# Patient Record
Sex: MALE | Race: WHITE
[De-identification: names, ages, dates, MRNs, and addresses within clinical notes are randomized per-mention and may not be internally consistent; named-entity substitution may affect disease eponyms.]

---

## 2017-01-06 ENCOUNTER — RX RENEWAL (OUTPATIENT)
Age: 62
End: 2017-01-06

## 2017-01-12 ENCOUNTER — APPOINTMENT (OUTPATIENT)
Dept: HEART AND VASCULAR | Facility: CLINIC | Age: 62
End: 2017-01-12

## 2017-01-12 VITALS
DIASTOLIC BLOOD PRESSURE: 76 MMHG | WEIGHT: 250 LBS | HEART RATE: 84 BPM | SYSTOLIC BLOOD PRESSURE: 134 MMHG | HEIGHT: 73 IN | BODY MASS INDEX: 33.13 KG/M2

## 2017-01-15 LAB
ALBUMIN SERPL ELPH-MCNC: 4.3 G/DL
ALP BLD-CCNC: 78 U/L
ALT SERPL-CCNC: 34 U/L
ANION GAP SERPL CALC-SCNC: 14 MMOL/L
APTT BLD: 27.8 SEC
AST SERPL-CCNC: 22 U/L
BASOPHILS # BLD AUTO: 0.04 K/UL
BASOPHILS NFR BLD AUTO: 0.5 %
BILIRUB SERPL-MCNC: 0.5 MG/DL
BUN SERPL-MCNC: 25 MG/DL
CALCIUM SERPL-MCNC: 9.7 MG/DL
CHLORIDE SERPL-SCNC: 99 MMOL/L
CHOLEST SERPL-MCNC: 199 MG/DL
CHOLEST/HDLC SERPL: 4.4 RATIO
CO2 SERPL-SCNC: 28 MMOL/L
CREAT SERPL-MCNC: 1.06 MG/DL
EOSINOPHIL # BLD AUTO: 0.17 K/UL
EOSINOPHIL NFR BLD AUTO: 2 %
GLUCOSE SERPL-MCNC: 162 MG/DL
HBA1C MFR BLD HPLC: 9.8 %
HCT VFR BLD CALC: 43.9 %
HDLC SERPL-MCNC: 45 MG/DL
HGB BLD-MCNC: 14.2 G/DL
IMM GRANULOCYTES NFR BLD AUTO: 0.1 %
INR PPP: 0.96 RATIO
LDLC SERPL CALC-MCNC: 122 MG/DL
LYMPHOCYTES # BLD AUTO: 1.75 K/UL
LYMPHOCYTES NFR BLD AUTO: 21 %
MAN DIFF?: NORMAL
MCHC RBC-ENTMCNC: 28.3 PG
MCHC RBC-ENTMCNC: 32.3 GM/DL
MCV RBC AUTO: 87.5 FL
MONOCYTES # BLD AUTO: 0.85 K/UL
MONOCYTES NFR BLD AUTO: 10.2 %
NEUTROPHILS # BLD AUTO: 5.53 K/UL
NEUTROPHILS NFR BLD AUTO: 66.2 %
PLATELET # BLD AUTO: 239 K/UL
POTASSIUM SERPL-SCNC: 5 MMOL/L
PROT SERPL-MCNC: 7.2 G/DL
PT BLD: 10.9 SEC
RBC # BLD: 5.02 M/UL
RBC # FLD: 14.7 %
SODIUM SERPL-SCNC: 141 MMOL/L
TRIGL SERPL-MCNC: 161 MG/DL
WBC # FLD AUTO: 8.35 K/UL

## 2017-03-06 ENCOUNTER — RX RENEWAL (OUTPATIENT)
Age: 62
End: 2017-03-06

## 2017-04-24 ENCOUNTER — LABORATORY RESULT (OUTPATIENT)
Age: 62
End: 2017-04-24

## 2017-04-25 LAB
ALBUMIN SERPL ELPH-MCNC: 3.9 G/DL
ALP BLD-CCNC: 82 U/L
ALT SERPL-CCNC: 54 U/L
ANION GAP SERPL CALC-SCNC: 16 MMOL/L
APTT BLD: 27.8 SEC
AST SERPL-CCNC: 30 U/L
BILIRUB SERPL-MCNC: 0.4 MG/DL
BUN SERPL-MCNC: 23 MG/DL
CALCIUM SERPL-MCNC: 9.6 MG/DL
CHLORIDE SERPL-SCNC: 101 MMOL/L
CHOLEST SERPL-MCNC: 161 MG/DL
CHOLEST/HDLC SERPL: 3.7 RATIO
CO2 SERPL-SCNC: 26 MMOL/L
CREAT SERPL-MCNC: 1.02 MG/DL
GLUCOSE SERPL-MCNC: 154 MG/DL
HDLC SERPL-MCNC: 44 MG/DL
INR PPP: 0.96 RATIO
LDLC SERPL CALC-MCNC: 83 MG/DL
POTASSIUM SERPL-SCNC: 5.2 MMOL/L
PROT SERPL-MCNC: 7.2 G/DL
PT BLD: 10.8 SEC
SODIUM SERPL-SCNC: 143 MMOL/L
TRIGL SERPL-MCNC: 170 MG/DL
TSH SERPL-ACNC: 3.68 UIU/ML

## 2017-05-02 LAB
BASOPHILS # BLD AUTO: 0.05 K/UL
BASOPHILS NFR BLD AUTO: 0.6 %
EOSINOPHIL # BLD AUTO: 0.23 K/UL
EOSINOPHIL NFR BLD AUTO: 2.7 %
HBA1C MFR BLD HPLC: 9.9 %
HCT VFR BLD CALC: 42.1 %
HGB BLD-MCNC: 13.6 G/DL
IMM GRANULOCYTES NFR BLD AUTO: 0.2 %
LYMPHOCYTES # BLD AUTO: 1.84 K/UL
LYMPHOCYTES NFR BLD AUTO: 21.5 %
MAN DIFF?: NORMAL
MCHC RBC-ENTMCNC: 27.4 PG
MCHC RBC-ENTMCNC: 32.3 GM/DL
MCV RBC AUTO: 84.7 FL
MONOCYTES # BLD AUTO: 0.78 K/UL
MONOCYTES NFR BLD AUTO: 9.1 %
NEUTROPHILS # BLD AUTO: 5.62 K/UL
NEUTROPHILS NFR BLD AUTO: 65.9 %
PLATELET # BLD AUTO: 273 K/UL
RBC # BLD: 4.97 M/UL
RBC # FLD: 14.5 %
WBC # FLD AUTO: 8.54 K/UL

## 2017-05-18 ENCOUNTER — APPOINTMENT (OUTPATIENT)
Dept: BARIATRICS | Facility: CLINIC | Age: 62
End: 2017-05-18

## 2017-05-18 VITALS
SYSTOLIC BLOOD PRESSURE: 134 MMHG | HEART RATE: 89 BPM | TEMPERATURE: 97.8 F | OXYGEN SATURATION: 97 % | BODY MASS INDEX: 34.76 KG/M2 | HEIGHT: 73 IN | DIASTOLIC BLOOD PRESSURE: 72 MMHG | WEIGHT: 262.25 LBS

## 2017-06-06 ENCOUNTER — RX RENEWAL (OUTPATIENT)
Age: 62
End: 2017-06-06

## 2017-06-07 ENCOUNTER — APPOINTMENT (OUTPATIENT)
Dept: BARIATRICS | Facility: CLINIC | Age: 62
End: 2017-06-07

## 2017-06-19 ENCOUNTER — INPATIENT (INPATIENT)
Facility: HOSPITAL | Age: 62
LOS: 2 days | Discharge: HOME | End: 2017-06-22
Attending: INTERNAL MEDICINE

## 2017-06-19 DIAGNOSIS — J18.9 PNEUMONIA, UNSPECIFIED ORGANISM: ICD-10-CM

## 2017-06-19 DIAGNOSIS — E11.621 TYPE 2 DIABETES MELLITUS WITH FOOT ULCER: ICD-10-CM

## 2017-06-19 DIAGNOSIS — I24.9 ACUTE ISCHEMIC HEART DISEASE, UNSPECIFIED: ICD-10-CM

## 2017-06-28 DIAGNOSIS — Z86.14 PERSONAL HISTORY OF METHICILLIN RESISTANT STAPHYLOCOCCUS AUREUS INFECTION: ICD-10-CM

## 2017-06-28 DIAGNOSIS — I25.10 ATHEROSCLEROTIC HEART DISEASE OF NATIVE CORONARY ARTERY WITHOUT ANGINA PECTORIS: ICD-10-CM

## 2017-06-28 DIAGNOSIS — J18.1 LOBAR PNEUMONIA, UNSPECIFIED ORGANISM: ICD-10-CM

## 2017-06-28 DIAGNOSIS — Z95.5 PRESENCE OF CORONARY ANGIOPLASTY IMPLANT AND GRAFT: ICD-10-CM

## 2017-06-28 DIAGNOSIS — F32.9 MAJOR DEPRESSIVE DISORDER, SINGLE EPISODE, UNSPECIFIED: ICD-10-CM

## 2017-06-28 DIAGNOSIS — A41.9 SEPSIS, UNSPECIFIED ORGANISM: ICD-10-CM

## 2017-06-28 DIAGNOSIS — J18.9 PNEUMONIA, UNSPECIFIED ORGANISM: ICD-10-CM

## 2017-06-28 DIAGNOSIS — Z79.82 LONG TERM (CURRENT) USE OF ASPIRIN: ICD-10-CM

## 2017-06-28 DIAGNOSIS — E78.5 HYPERLIPIDEMIA, UNSPECIFIED: ICD-10-CM

## 2017-06-28 DIAGNOSIS — Z79.4 LONG TERM (CURRENT) USE OF INSULIN: ICD-10-CM

## 2017-06-28 DIAGNOSIS — E11.9 TYPE 2 DIABETES MELLITUS WITHOUT COMPLICATIONS: ICD-10-CM

## 2017-06-28 DIAGNOSIS — M19.90 UNSPECIFIED OSTEOARTHRITIS, UNSPECIFIED SITE: ICD-10-CM

## 2017-06-28 DIAGNOSIS — J96.01 ACUTE RESPIRATORY FAILURE WITH HYPOXIA: ICD-10-CM

## 2017-07-02 DIAGNOSIS — J18.9 PNEUMONIA, UNSPECIFIED ORGANISM: ICD-10-CM

## 2017-07-05 ENCOUNTER — OUTPATIENT (OUTPATIENT)
Dept: OUTPATIENT SERVICES | Facility: HOSPITAL | Age: 62
LOS: 1 days | Discharge: HOME | End: 2017-07-05

## 2017-07-05 DIAGNOSIS — I24.9 ACUTE ISCHEMIC HEART DISEASE, UNSPECIFIED: ICD-10-CM

## 2017-07-05 DIAGNOSIS — J18.9 PNEUMONIA, UNSPECIFIED ORGANISM: ICD-10-CM

## 2017-07-05 DIAGNOSIS — E11.621 TYPE 2 DIABETES MELLITUS WITH FOOT ULCER: ICD-10-CM

## 2017-07-05 DIAGNOSIS — R06.00 DYSPNEA, UNSPECIFIED: ICD-10-CM

## 2017-07-11 ENCOUNTER — APPOINTMENT (OUTPATIENT)
Dept: HEART AND VASCULAR | Facility: CLINIC | Age: 62
End: 2017-07-11

## 2017-07-11 VITALS
DIASTOLIC BLOOD PRESSURE: 84 MMHG | HEART RATE: 87 BPM | HEIGHT: 72 IN | BODY MASS INDEX: 35.08 KG/M2 | WEIGHT: 259 LBS | SYSTOLIC BLOOD PRESSURE: 138 MMHG

## 2017-07-11 RX ORDER — ACYCLOVIR 50 MG/G
5 OINTMENT TOPICAL 3 TIMES DAILY
Qty: 30 | Refills: 0 | Status: DISCONTINUED | COMMUNITY
Start: 2017-01-19 | End: 2017-07-11

## 2017-07-11 RX ORDER — LEVOFLOXACIN 750 MG/1
750 TABLET, FILM COATED ORAL
Qty: 7 | Refills: 0 | Status: DISCONTINUED | COMMUNITY
Start: 2017-06-22

## 2017-07-11 RX ORDER — PREDNISONE 20 MG/1
20 TABLET ORAL
Qty: 15 | Refills: 0 | Status: DISCONTINUED | COMMUNITY
Start: 2017-06-22

## 2017-07-13 LAB
ALBUMIN SERPL ELPH-MCNC: 4.1 G/DL
ALP BLD-CCNC: 80 U/L
ALT SERPL-CCNC: 29 U/L
ANION GAP SERPL CALC-SCNC: 16 MMOL/L
AST SERPL-CCNC: 20 U/L
BASOPHILS # BLD AUTO: 0.03 K/UL
BASOPHILS NFR BLD AUTO: 0.3 %
BILIRUB SERPL-MCNC: 0.5 MG/DL
BUN SERPL-MCNC: 24 MG/DL
CALCIUM SERPL-MCNC: 10 MG/DL
CHLORIDE SERPL-SCNC: 101 MMOL/L
CHOLEST SERPL-MCNC: 163 MG/DL
CHOLEST/HDLC SERPL: 2.9 RATIO
CO2 SERPL-SCNC: 26 MMOL/L
CREAT SERPL-MCNC: 1.05 MG/DL
EOSINOPHIL # BLD AUTO: 0.35 K/UL
EOSINOPHIL NFR BLD AUTO: 3.3 %
GLUCOSE SERPL-MCNC: 291 MG/DL
HBA1C MFR BLD HPLC: 11.4 %
HCT VFR BLD CALC: 41.4 %
HDLC SERPL-MCNC: 56 MG/DL
HGB BLD-MCNC: 12.9 G/DL
IMM GRANULOCYTES NFR BLD AUTO: 0.3 %
LDLC SERPL CALC-MCNC: 87 MG/DL
LYMPHOCYTES # BLD AUTO: 1.7 K/UL
LYMPHOCYTES NFR BLD AUTO: 16.1 %
MAN DIFF?: NORMAL
MCHC RBC-ENTMCNC: 27.1 PG
MCHC RBC-ENTMCNC: 31.2 GM/DL
MCV RBC AUTO: 87 FL
MONOCYTES # BLD AUTO: 0.87 K/UL
MONOCYTES NFR BLD AUTO: 8.3 %
NEUTROPHILS # BLD AUTO: 7.55 K/UL
NEUTROPHILS NFR BLD AUTO: 71.7 %
PLATELET # BLD AUTO: 255 K/UL
POTASSIUM SERPL-SCNC: 6 MMOL/L
PROT SERPL-MCNC: 7.2 G/DL
RBC # BLD: 4.76 M/UL
RBC # FLD: 15.3 %
SODIUM SERPL-SCNC: 143 MMOL/L
TRIGL SERPL-MCNC: 98 MG/DL
WBC # FLD AUTO: 10.53 K/UL

## 2017-07-30 ENCOUNTER — RX RENEWAL (OUTPATIENT)
Age: 62
End: 2017-07-30

## 2017-08-04 ENCOUNTER — APPOINTMENT (OUTPATIENT)
Dept: ORTHOPEDIC SURGERY | Facility: CLINIC | Age: 62
End: 2017-08-04
Payer: COMMERCIAL

## 2017-08-04 VITALS — HEIGHT: 72 IN | BODY MASS INDEX: 34.95 KG/M2 | WEIGHT: 258 LBS

## 2017-08-04 PROCEDURE — 99214 OFFICE O/P EST MOD 30 MIN: CPT

## 2017-08-16 ENCOUNTER — FORM ENCOUNTER (OUTPATIENT)
Age: 62
End: 2017-08-16

## 2017-08-17 ENCOUNTER — OUTPATIENT (OUTPATIENT)
Dept: OUTPATIENT SERVICES | Facility: HOSPITAL | Age: 62
LOS: 1 days | End: 2017-08-17
Payer: COMMERCIAL

## 2017-08-17 DIAGNOSIS — I25.9 CHRONIC ISCHEMIC HEART DISEASE, UNSPECIFIED: ICD-10-CM

## 2017-08-17 PROCEDURE — A9505: CPT

## 2017-08-17 PROCEDURE — 78452 HT MUSCLE IMAGE SPECT MULT: CPT | Mod: 26

## 2017-08-17 PROCEDURE — 93017 CV STRESS TEST TRACING ONLY: CPT

## 2017-08-17 PROCEDURE — 93016 CV STRESS TEST SUPVJ ONLY: CPT

## 2017-08-17 PROCEDURE — 93018 CV STRESS TEST I&R ONLY: CPT

## 2017-08-17 PROCEDURE — 78452 HT MUSCLE IMAGE SPECT MULT: CPT

## 2017-08-17 PROCEDURE — A9500: CPT

## 2017-08-26 ENCOUNTER — INPATIENT (INPATIENT)
Facility: HOSPITAL | Age: 62
LOS: 2 days | Discharge: HOME | End: 2017-08-29
Attending: INTERNAL MEDICINE

## 2017-08-26 DIAGNOSIS — A48.0 GAS GANGRENE: ICD-10-CM

## 2017-08-26 DIAGNOSIS — I24.9 ACUTE ISCHEMIC HEART DISEASE, UNSPECIFIED: ICD-10-CM

## 2017-08-26 DIAGNOSIS — E11.621 TYPE 2 DIABETES MELLITUS WITH FOOT ULCER: ICD-10-CM

## 2017-08-26 DIAGNOSIS — J18.9 PNEUMONIA, UNSPECIFIED ORGANISM: ICD-10-CM

## 2017-08-31 DIAGNOSIS — Z96.41 PRESENCE OF INSULIN PUMP (EXTERNAL) (INTERNAL): ICD-10-CM

## 2017-08-31 DIAGNOSIS — I25.2 OLD MYOCARDIAL INFARCTION: ICD-10-CM

## 2017-08-31 DIAGNOSIS — B95.62 METHICILLIN RESISTANT STAPHYLOCOCCUS AUREUS INFECTION AS THE CAUSE OF DISEASES CLASSIFIED ELSEWHERE: ICD-10-CM

## 2017-08-31 DIAGNOSIS — E11.69 TYPE 2 DIABETES MELLITUS WITH OTHER SPECIFIED COMPLICATION: ICD-10-CM

## 2017-08-31 DIAGNOSIS — E11.621 TYPE 2 DIABETES MELLITUS WITH FOOT ULCER: ICD-10-CM

## 2017-08-31 DIAGNOSIS — M86.172 OTHER ACUTE OSTEOMYELITIS, LEFT ANKLE AND FOOT: ICD-10-CM

## 2017-08-31 DIAGNOSIS — L97.529 NON-PRESSURE CHRONIC ULCER OF OTHER PART OF LEFT FOOT WITH UNSPECIFIED SEVERITY: ICD-10-CM

## 2017-08-31 DIAGNOSIS — I25.10 ATHEROSCLEROTIC HEART DISEASE OF NATIVE CORONARY ARTERY WITHOUT ANGINA PECTORIS: ICD-10-CM

## 2017-08-31 DIAGNOSIS — Z95.5 PRESENCE OF CORONARY ANGIOPLASTY IMPLANT AND GRAFT: ICD-10-CM

## 2017-08-31 DIAGNOSIS — K57.90 DIVERTICULOSIS OF INTESTINE, PART UNSPECIFIED, WITHOUT PERFORATION OR ABSCESS WITHOUT BLEEDING: ICD-10-CM

## 2017-08-31 DIAGNOSIS — E11.40 TYPE 2 DIABETES MELLITUS WITH DIABETIC NEUROPATHY, UNSPECIFIED: ICD-10-CM

## 2017-10-20 ENCOUNTER — APPOINTMENT (OUTPATIENT)
Dept: HEART AND VASCULAR | Facility: CLINIC | Age: 62
End: 2017-10-20
Payer: COMMERCIAL

## 2017-10-20 ENCOUNTER — LABORATORY RESULT (OUTPATIENT)
Age: 62
End: 2017-10-20

## 2017-10-20 VITALS — TEMPERATURE: 98.5 F

## 2017-10-20 VITALS
SYSTOLIC BLOOD PRESSURE: 133 MMHG | DIASTOLIC BLOOD PRESSURE: 82 MMHG | BODY MASS INDEX: 34.86 KG/M2 | HEART RATE: 62 BPM | WEIGHT: 257 LBS

## 2017-10-20 PROCEDURE — 93325 DOPPLER ECHO COLOR FLOW MAPG: CPT

## 2017-10-20 PROCEDURE — 93321 DOPPLER ECHO F-UP/LMTD STD: CPT

## 2017-10-20 PROCEDURE — 93000 ELECTROCARDIOGRAM COMPLETE: CPT

## 2017-10-20 PROCEDURE — 99214 OFFICE O/P EST MOD 30 MIN: CPT | Mod: 25

## 2017-10-20 PROCEDURE — 36415 COLL VENOUS BLD VENIPUNCTURE: CPT

## 2017-10-20 PROCEDURE — 93308 TTE F-UP OR LMTD: CPT

## 2017-10-22 LAB
ALBUMIN SERPL ELPH-MCNC: 4.4 G/DL
ALP BLD-CCNC: 78 U/L
ALT SERPL-CCNC: 30 U/L
ANA PAT FLD IF-IMP: ABNORMAL
ANA SER IF-ACNC: ABNORMAL
ANION GAP SERPL CALC-SCNC: 19 MMOL/L
AST SERPL-CCNC: 20 U/L
B BURGDOR IGG+IGM SER QL IB: NORMAL
BASOPHILS # BLD AUTO: 0.05 K/UL
BASOPHILS NFR BLD AUTO: 0.6 %
BILIRUB SERPL-MCNC: 0.8 MG/DL
BUN SERPL-MCNC: 23 MG/DL
CALCIUM SERPL-MCNC: 9.8 MG/DL
CHLORIDE SERPL-SCNC: 99 MMOL/L
CO2 SERPL-SCNC: 25 MMOL/L
CREAT SERPL-MCNC: 1.01 MG/DL
EOSINOPHIL # BLD AUTO: 0.26 K/UL
EOSINOPHIL NFR BLD AUTO: 3.2 %
ERYTHROCYTE [SEDIMENTATION RATE] IN BLOOD BY WESTERGREN METHOD: 21 MM/HR
GLUCOSE SERPL-MCNC: 111 MG/DL
HCT VFR BLD CALC: 43.1 %
HGB BLD-MCNC: 13.5 G/DL
IMM GRANULOCYTES NFR BLD AUTO: 0.1 %
LYMPHOCYTES # BLD AUTO: 2.11 K/UL
LYMPHOCYTES NFR BLD AUTO: 26.2 %
MAN DIFF?: NORMAL
MCHC RBC-ENTMCNC: 27.4 PG
MCHC RBC-ENTMCNC: 31.3 GM/DL
MCV RBC AUTO: 87.4 FL
MONOCYTES # BLD AUTO: 0.92 K/UL
MONOCYTES NFR BLD AUTO: 11.4 %
NEUTROPHILS # BLD AUTO: 4.7 K/UL
NEUTROPHILS NFR BLD AUTO: 58.5 %
PLATELET # BLD AUTO: 244 K/UL
POTASSIUM SERPL-SCNC: 5.4 MMOL/L
PROT SERPL-MCNC: 7 G/DL
RBC # BLD: 4.93 M/UL
RBC # FLD: 15.1 %
RHEUMATOID FACT SER QL: <7 IU/ML
SODIUM SERPL-SCNC: 143 MMOL/L
URATE SERPL-MCNC: 5.2 MG/DL
WBC # FLD AUTO: 8.05 K/UL

## 2017-11-11 ENCOUNTER — OUTPATIENT (OUTPATIENT)
Dept: OUTPATIENT SERVICES | Facility: HOSPITAL | Age: 62
LOS: 1 days | Discharge: HOME | End: 2017-11-11

## 2017-11-11 DIAGNOSIS — E11.9 TYPE 2 DIABETES MELLITUS WITHOUT COMPLICATIONS: ICD-10-CM

## 2017-11-11 DIAGNOSIS — I24.9 ACUTE ISCHEMIC HEART DISEASE, UNSPECIFIED: ICD-10-CM

## 2017-11-11 DIAGNOSIS — E11.621 TYPE 2 DIABETES MELLITUS WITH FOOT ULCER: ICD-10-CM

## 2017-11-11 DIAGNOSIS — J18.9 PNEUMONIA, UNSPECIFIED ORGANISM: ICD-10-CM

## 2017-11-17 VITALS
DIASTOLIC BLOOD PRESSURE: 74 MMHG | OXYGEN SATURATION: 95 % | SYSTOLIC BLOOD PRESSURE: 132 MMHG | TEMPERATURE: 97 F | HEART RATE: 85 BPM | RESPIRATION RATE: 16 BRPM

## 2017-11-17 PROCEDURE — 73110 X-RAY EXAM OF WRIST: CPT | Mod: 26

## 2017-11-17 PROCEDURE — 73502 X-RAY EXAM HIP UNI 2-3 VIEWS: CPT | Mod: 26,LT

## 2017-11-17 PROCEDURE — 73080 X-RAY EXAM OF ELBOW: CPT | Mod: 26,LT

## 2017-11-17 PROCEDURE — 99285 EMERGENCY DEPT VISIT HI MDM: CPT

## 2017-11-17 PROCEDURE — 72192 CT PELVIS W/O DYE: CPT | Mod: 26

## 2017-11-17 RX ORDER — AMPICILLIN SODIUM AND SULBACTAM SODIUM 250; 125 MG/ML; MG/ML
3 INJECTION, POWDER, FOR SUSPENSION INTRAMUSCULAR; INTRAVENOUS ONCE
Qty: 0 | Refills: 0 | Status: COMPLETED | OUTPATIENT
Start: 2017-11-17 | End: 2017-11-17

## 2017-11-17 RX ORDER — HYDROMORPHONE HYDROCHLORIDE 2 MG/ML
0.5 INJECTION INTRAMUSCULAR; INTRAVENOUS; SUBCUTANEOUS ONCE
Qty: 0 | Refills: 0 | Status: DISCONTINUED | OUTPATIENT
Start: 2017-11-17 | End: 2017-11-17

## 2017-11-17 RX ORDER — OXYCODONE AND ACETAMINOPHEN 5; 325 MG/1; MG/1
2 TABLET ORAL ONCE
Qty: 0 | Refills: 0 | Status: DISCONTINUED | OUTPATIENT
Start: 2017-11-17 | End: 2017-11-17

## 2017-11-17 RX ADMIN — HYDROMORPHONE HYDROCHLORIDE 0.5 MILLIGRAM(S): 2 INJECTION INTRAMUSCULAR; INTRAVENOUS; SUBCUTANEOUS at 23:41

## 2017-11-17 RX ADMIN — OXYCODONE AND ACETAMINOPHEN 2 TABLET(S): 5; 325 TABLET ORAL at 19:54

## 2017-11-17 RX ADMIN — HYDROMORPHONE HYDROCHLORIDE 0.5 MILLIGRAM(S): 2 INJECTION INTRAMUSCULAR; INTRAVENOUS; SUBCUTANEOUS at 22:10

## 2017-11-17 RX ADMIN — AMPICILLIN SODIUM AND SULBACTAM SODIUM 200 GRAM(S): 250; 125 INJECTION, POWDER, FOR SUSPENSION INTRAMUSCULAR; INTRAVENOUS at 23:59

## 2017-11-17 RX ADMIN — OXYCODONE AND ACETAMINOPHEN 2 TABLET(S): 5; 325 TABLET ORAL at 21:56

## 2017-11-17 NOTE — ED PROVIDER NOTE - SECONDARY DIAGNOSIS.
Contusion of hip and thigh, left, initial encounter Fall (on) (from) other stairs and steps, initial encounter

## 2017-11-17 NOTE — ED PROVIDER NOTE - OBJECTIVE STATEMENT
63 yo male with h/o HTN, DM, accidentally tripped and fell yesterday at home. Pt reports he was going down the stairs, accidentally slipped and missed the step, fell on his right side. Pt reports that fall was purely mechanical, no dizziness, lightheadedness, weakness, CP, HA before or after his fall. Pt c/o pain to his right hip and unable to ambulate, pt also c/o pain , swelling and discolorations to right wrist, has few abrasions to right elbow. No deformity noticed to right LE. 63 yo male with h/o HTN, DM, accidentally tripped and fell yesterday at home. Pt reports he was going down the stairs, accidentally slipped and missed the step, fell on his left side. Pt reports that fall was purely mechanical, no dizziness, lightheadedness, weakness, CP, HA before or after his fall. Pt c/o pain to his left hip and unable to ambulate, pt also c/o pain , swelling and discolorations to left wrist, has few abrasions to left elbow. No deformity noticed to left LE. Pt denies head injury or LOC, denies neck pain, HA.

## 2017-11-17 NOTE — ED PROVIDER NOTE - MEDICAL DECISION MAKING DETAILS
63 yo male s/p accidental mechanical fall at home yesterday. no LOC or head injury, unable top ambulate due to pain in his left hip and lower back/pelvis, also has edema and ecchymosis to left wrist. pendings Xray to r/o fx, will re-evaluate after

## 2017-11-17 NOTE — ED ADULT TRIAGE NOTE - CHIEF COMPLAINT QUOTE
pt had mechanical fall yesterday c/o left hip & leg pain and left hand pain with swelling. pt unable to bear weight on leg.

## 2017-11-17 NOTE — ED ADULT NURSE REASSESSMENT NOTE - NS ED NURSE REASSESS COMMENT FT1
Patient CT scan pending.  Lab on hold until after CT scan as per ED MD.  Patient vital signs stable, not in any pain, distress or SOB.

## 2017-11-17 NOTE — ED ADULT NURSE NOTE - CHPI ED SYMPTOMS NEG
no weakness/no deformity/no fever/no tingling/no vomiting/no loss of consciousness/no bleeding/no confusion/no numbness

## 2017-11-17 NOTE — ED PROVIDER NOTE - MUSCULOSKELETAL, MLM
Spine appears normal, range of motion is not limited, no vertebral point of tenderness to LS spine, left hip- decreased ROM due to pain, left wrist edema, ecchymosis, and decreased ROM due to pain

## 2017-11-17 NOTE — ED PROVIDER NOTE - ATTENDING CONTRIBUTION TO CARE
61yo male with hx of CAD DM bibems after slip and fall 1 day ago with left hip pain and diff bearing weight also left wrist pain and erythema - also left elbow pain and tenderness- no obv left hip or pelvix fx on plain films or CT- unable to bear weight-  fall risk /req admission--may need MRI  will need PT gait eval  no obv s/s of suggest CVA at this time

## 2017-11-17 NOTE — ED PROVIDER NOTE - PSH
History of surgery to major organs, presenting hazards to health  x 2  Other postprocedural status  Fixation hardware in foot

## 2017-11-17 NOTE — ED PROVIDER NOTE - PMH
Atherosclerosis of coronary artery  CAD (coronary artery disease)  History of surgery to heart and great vessels, presenting hazards to health  x 4 in 2005  Status post percutaneous transluminal coronary angioplasty  in 2012  Type 2 diabetes mellitus  DM (diabetes mellitus), type 2  Type 2 diabetes mellitus with neurological manifestations  Diabetic neuropathy with neurologic complication

## 2017-11-17 NOTE — ED PROVIDER NOTE - SKIN, MLM
Skin normal color for race, warm, dry and intact. No evidence of rash.  ecchymosis to left wrist, edema, few abrasions to left wrist

## 2017-11-17 NOTE — ED ADULT NURSE NOTE - OBJECTIVE STATEMENT
PT BIBA following fall yesterday at home down stairs.  Pt reports pain in left lower back radiating to left buttock, hip and upper left leg.  Positive PMS x4 extremities.  Pt unable to ambulate on left side.  No leg shortening or outer rotation seen at this time.  Pt also has swelling to left wrist, positive PMS with full ROM.  Pt denies all other medical complaints at this time. No chest pain, SOB, abd pain, /GI symptoms. PT BIBA following fall yesterday at home down stairs.  Pt reports pain in left lower back radiating to left buttock, hip and upper left leg.  Positive PMS x4 extremities.  Pt unable to ambulate on left side.  No leg shortening or outer rotation seen at this time.  Pt also has swelling to left wrist and hand, positive PMS with full ROM.  Pt denies all other medical complaints at this time. No chest pain, SOB, abd pain, /GI symptoms.

## 2017-11-17 NOTE — ED PROVIDER NOTE - PROGRESS NOTE DETAILS
no acute fx on the Xray and CT scan.  pt failed to ambulate, gait unsteady due to pain. will admit for further evaluation. Pt has unsteady gait due to pain and unsafe for discharge , risk to fall.

## 2017-11-17 NOTE — ED PROVIDER NOTE - CARE PLAN
Principal Discharge DX:	Impaired ambulation  Secondary Diagnosis:	Contusion of hip and thigh, left, initial encounter  Secondary Diagnosis:	Fall (on) (from) other stairs and steps, initial encounter

## 2017-11-18 ENCOUNTER — INPATIENT (INPATIENT)
Facility: HOSPITAL | Age: 62
LOS: 1 days | Discharge: HOME | DRG: 556 | End: 2017-11-20
Attending: HOSPITALIST | Admitting: HOSPITALIST
Payer: COMMERCIAL

## 2017-11-18 DIAGNOSIS — E87.1 HYPO-OSMOLALITY AND HYPONATREMIA: ICD-10-CM

## 2017-11-18 DIAGNOSIS — R26.2 DIFFICULTY IN WALKING, NOT ELSEWHERE CLASSIFIED: ICD-10-CM

## 2017-11-18 DIAGNOSIS — R73.9 HYPERGLYCEMIA, UNSPECIFIED: ICD-10-CM

## 2017-11-18 DIAGNOSIS — E11.49 TYPE 2 DIABETES MELLITUS WITH OTHER DIABETIC NEUROLOGICAL COMPLICATION: ICD-10-CM

## 2017-11-18 DIAGNOSIS — D64.9 ANEMIA, UNSPECIFIED: ICD-10-CM

## 2017-11-18 DIAGNOSIS — R63.8 OTHER SYMPTOMS AND SIGNS CONCERNING FOOD AND FLUID INTAKE: ICD-10-CM

## 2017-11-18 DIAGNOSIS — Z29.9 ENCOUNTER FOR PROPHYLACTIC MEASURES, UNSPECIFIED: ICD-10-CM

## 2017-11-18 DIAGNOSIS — I25.10 ATHEROSCLEROTIC HEART DISEASE OF NATIVE CORONARY ARTERY WITHOUT ANGINA PECTORIS: ICD-10-CM

## 2017-11-18 LAB
ALBUMIN SERPL ELPH-MCNC: 3.4 G/DL — SIGNIFICANT CHANGE UP (ref 3.3–5)
ALP SERPL-CCNC: 67 U/L — SIGNIFICANT CHANGE UP (ref 40–120)
ALT FLD-CCNC: 24 U/L — SIGNIFICANT CHANGE UP (ref 10–45)
ANION GAP SERPL CALC-SCNC: 11 MMOL/L — SIGNIFICANT CHANGE UP (ref 5–17)
ANION GAP SERPL CALC-SCNC: 12 MMOL/L — SIGNIFICANT CHANGE UP (ref 5–17)
APTT BLD: 26.6 SEC — LOW (ref 27.5–37.4)
AST SERPL-CCNC: 18 U/L — SIGNIFICANT CHANGE UP (ref 10–40)
BASOPHILS NFR BLD AUTO: 0.4 % — SIGNIFICANT CHANGE UP (ref 0–2)
BILIRUB SERPL-MCNC: 0.6 MG/DL — SIGNIFICANT CHANGE UP (ref 0.2–1.2)
BUN SERPL-MCNC: 24 MG/DL — HIGH (ref 7–23)
BUN SERPL-MCNC: 31 MG/DL — HIGH (ref 7–23)
CALCIUM SERPL-MCNC: 8.6 MG/DL — SIGNIFICANT CHANGE UP (ref 8.4–10.5)
CALCIUM SERPL-MCNC: 8.9 MG/DL — SIGNIFICANT CHANGE UP (ref 8.4–10.5)
CHLORIDE SERPL-SCNC: 98 MMOL/L — SIGNIFICANT CHANGE UP (ref 96–108)
CHLORIDE SERPL-SCNC: 99 MMOL/L — SIGNIFICANT CHANGE UP (ref 96–108)
CO2 SERPL-SCNC: 24 MMOL/L — SIGNIFICANT CHANGE UP (ref 22–31)
CO2 SERPL-SCNC: 25 MMOL/L — SIGNIFICANT CHANGE UP (ref 22–31)
CREAT SERPL-MCNC: 1.01 MG/DL — SIGNIFICANT CHANGE UP (ref 0.5–1.3)
CREAT SERPL-MCNC: 1.01 MG/DL — SIGNIFICANT CHANGE UP (ref 0.5–1.3)
EOSINOPHIL NFR BLD AUTO: 5.1 % — SIGNIFICANT CHANGE UP (ref 0–6)
FERRITIN SERPL-MCNC: 49.9 NG/ML — SIGNIFICANT CHANGE UP (ref 30–400)
GLUCOSE BLDC GLUCOMTR-MCNC: 116 MG/DL — HIGH (ref 70–99)
GLUCOSE BLDC GLUCOMTR-MCNC: 133 MG/DL — HIGH (ref 70–99)
GLUCOSE BLDC GLUCOMTR-MCNC: 202 MG/DL — HIGH (ref 70–99)
GLUCOSE BLDC GLUCOMTR-MCNC: 222 MG/DL — HIGH (ref 70–99)
GLUCOSE BLDC GLUCOMTR-MCNC: 231 MG/DL — HIGH (ref 70–99)
GLUCOSE BLDC GLUCOMTR-MCNC: 449 MG/DL — HIGH (ref 70–99)
GLUCOSE SERPL-MCNC: 199 MG/DL — HIGH (ref 70–99)
GLUCOSE SERPL-MCNC: 235 MG/DL — HIGH (ref 70–99)
HCT VFR BLD CALC: 38.5 % — LOW (ref 39–50)
HCT VFR BLD CALC: 38.9 % — LOW (ref 39–50)
HGB BLD-MCNC: 12.5 G/DL — LOW (ref 13–17)
HGB BLD-MCNC: 12.5 G/DL — LOW (ref 13–17)
INR BLD: 1.06 — SIGNIFICANT CHANGE UP (ref 0.88–1.16)
IRON SATN MFR SERPL: 20 % — SIGNIFICANT CHANGE UP (ref 16–55)
IRON SATN MFR SERPL: 62 UG/DL — SIGNIFICANT CHANGE UP (ref 45–165)
LYMPHOCYTES # BLD AUTO: 18.2 % — SIGNIFICANT CHANGE UP (ref 13–44)
MCHC RBC-ENTMCNC: 27.2 PG — SIGNIFICANT CHANGE UP (ref 27–34)
MCHC RBC-ENTMCNC: 27.4 PG — SIGNIFICANT CHANGE UP (ref 27–34)
MCHC RBC-ENTMCNC: 32.1 G/DL — SIGNIFICANT CHANGE UP (ref 32–36)
MCHC RBC-ENTMCNC: 32.5 G/DL — SIGNIFICANT CHANGE UP (ref 32–36)
MCV RBC AUTO: 84.2 FL — SIGNIFICANT CHANGE UP (ref 80–100)
MCV RBC AUTO: 84.6 FL — SIGNIFICANT CHANGE UP (ref 80–100)
MONOCYTES NFR BLD AUTO: 11.6 % — SIGNIFICANT CHANGE UP (ref 2–14)
NEUTROPHILS NFR BLD AUTO: 64.7 % — SIGNIFICANT CHANGE UP (ref 43–77)
PLATELET # BLD AUTO: 213 K/UL — SIGNIFICANT CHANGE UP (ref 150–400)
PLATELET # BLD AUTO: 215 K/UL — SIGNIFICANT CHANGE UP (ref 150–400)
POTASSIUM SERPL-MCNC: 4.3 MMOL/L — SIGNIFICANT CHANGE UP (ref 3.5–5.3)
POTASSIUM SERPL-MCNC: 4.4 MMOL/L — SIGNIFICANT CHANGE UP (ref 3.5–5.3)
POTASSIUM SERPL-SCNC: 4.3 MMOL/L — SIGNIFICANT CHANGE UP (ref 3.5–5.3)
POTASSIUM SERPL-SCNC: 4.4 MMOL/L — SIGNIFICANT CHANGE UP (ref 3.5–5.3)
PROT SERPL-MCNC: 6.7 G/DL — SIGNIFICANT CHANGE UP (ref 6–8.3)
PROTHROM AB SERPL-ACNC: 11.8 SEC — SIGNIFICANT CHANGE UP (ref 9.8–12.7)
RBC # BLD: 4.57 M/UL — SIGNIFICANT CHANGE UP (ref 4.2–5.8)
RBC # BLD: 4.6 M/UL — SIGNIFICANT CHANGE UP (ref 4.2–5.8)
RBC # FLD: 14.8 % — SIGNIFICANT CHANGE UP (ref 10.3–16.9)
RBC # FLD: 14.8 % — SIGNIFICANT CHANGE UP (ref 10.3–16.9)
SODIUM SERPL-SCNC: 134 MMOL/L — LOW (ref 135–145)
SODIUM SERPL-SCNC: 135 MMOL/L — SIGNIFICANT CHANGE UP (ref 135–145)
TIBC SERPL-MCNC: 307 UG/DL — SIGNIFICANT CHANGE UP (ref 220–430)
TRANSFERRIN SERPL-MCNC: 259 MG/DL — SIGNIFICANT CHANGE UP (ref 200–360)
UIBC SERPL-MCNC: 245 UG/DL — SIGNIFICANT CHANGE UP (ref 110–370)
WBC # BLD: 8.3 K/UL — SIGNIFICANT CHANGE UP (ref 3.8–10.5)
WBC # BLD: 9.4 K/UL — SIGNIFICANT CHANGE UP (ref 3.8–10.5)
WBC # FLD AUTO: 8.3 K/UL — SIGNIFICANT CHANGE UP (ref 3.8–10.5)
WBC # FLD AUTO: 9.4 K/UL — SIGNIFICANT CHANGE UP (ref 3.8–10.5)

## 2017-11-18 PROCEDURE — 99222 1ST HOSP IP/OBS MODERATE 55: CPT | Mod: GC

## 2017-11-18 RX ORDER — INSULIN LISPRO 100/ML
6 VIAL (ML) SUBCUTANEOUS ONCE
Qty: 0 | Refills: 0 | Status: DISCONTINUED | OUTPATIENT
Start: 2017-11-18 | End: 2017-11-18

## 2017-11-18 RX ORDER — ATORVASTATIN CALCIUM 80 MG/1
20 TABLET, FILM COATED ORAL AT BEDTIME
Qty: 0 | Refills: 0 | Status: DISCONTINUED | OUTPATIENT
Start: 2017-11-18 | End: 2017-11-20

## 2017-11-18 RX ORDER — DEXTROSE 50 % IN WATER 50 %
25 SYRINGE (ML) INTRAVENOUS ONCE
Qty: 0 | Refills: 0 | Status: DISCONTINUED | OUTPATIENT
Start: 2017-11-18 | End: 2017-11-20

## 2017-11-18 RX ORDER — INSULIN GLARGINE 100 [IU]/ML
10 INJECTION, SOLUTION SUBCUTANEOUS AT BEDTIME
Qty: 0 | Refills: 0 | Status: DISCONTINUED | OUTPATIENT
Start: 2017-11-18 | End: 2017-11-18

## 2017-11-18 RX ORDER — HYDROMORPHONE HYDROCHLORIDE 2 MG/ML
0.5 INJECTION INTRAMUSCULAR; INTRAVENOUS; SUBCUTANEOUS EVERY 4 HOURS
Qty: 0 | Refills: 0 | Status: DISCONTINUED | OUTPATIENT
Start: 2017-11-18 | End: 2017-11-18

## 2017-11-18 RX ORDER — INSULIN LISPRO 100/ML
VIAL (ML) SUBCUTANEOUS
Qty: 0 | Refills: 0 | Status: DISCONTINUED | OUTPATIENT
Start: 2017-11-18 | End: 2017-11-20

## 2017-11-18 RX ORDER — DEXTROSE 50 % IN WATER 50 %
1 SYRINGE (ML) INTRAVENOUS ONCE
Qty: 0 | Refills: 0 | Status: DISCONTINUED | OUTPATIENT
Start: 2017-11-18 | End: 2017-11-20

## 2017-11-18 RX ORDER — DULOXETINE HYDROCHLORIDE 30 MG/1
90 CAPSULE, DELAYED RELEASE ORAL DAILY
Qty: 0 | Refills: 0 | Status: DISCONTINUED | OUTPATIENT
Start: 2017-11-18 | End: 2017-11-20

## 2017-11-18 RX ORDER — INSULIN LISPRO 100/ML
6 VIAL (ML) SUBCUTANEOUS
Qty: 0 | Refills: 0 | Status: DISCONTINUED | OUTPATIENT
Start: 2017-11-18 | End: 2017-11-18

## 2017-11-18 RX ORDER — INSULIN GLARGINE 100 [IU]/ML
6 INJECTION, SOLUTION SUBCUTANEOUS AT BEDTIME
Qty: 0 | Refills: 0 | Status: DISCONTINUED | OUTPATIENT
Start: 2017-11-18 | End: 2017-11-18

## 2017-11-18 RX ORDER — CYCLOBENZAPRINE HYDROCHLORIDE 10 MG/1
5 TABLET, FILM COATED ORAL THREE TIMES A DAY
Qty: 0 | Refills: 0 | Status: DISCONTINUED | OUTPATIENT
Start: 2017-11-18 | End: 2017-11-20

## 2017-11-18 RX ORDER — HEPARIN SODIUM 5000 [USP'U]/ML
7500 INJECTION INTRAVENOUS; SUBCUTANEOUS EVERY 8 HOURS
Qty: 0 | Refills: 0 | Status: DISCONTINUED | OUTPATIENT
Start: 2017-11-18 | End: 2017-11-20

## 2017-11-18 RX ORDER — LIDOCAINE 4 G/100G
1 CREAM TOPICAL DAILY
Qty: 0 | Refills: 0 | Status: DISCONTINUED | OUTPATIENT
Start: 2017-11-18 | End: 2017-11-20

## 2017-11-18 RX ORDER — SIMVASTATIN 20 MG/1
0 TABLET, FILM COATED ORAL
Qty: 0 | Refills: 0 | COMMUNITY

## 2017-11-18 RX ORDER — SODIUM CHLORIDE 9 MG/ML
1000 INJECTION, SOLUTION INTRAVENOUS
Qty: 0 | Refills: 0 | Status: DISCONTINUED | OUTPATIENT
Start: 2017-11-18 | End: 2017-11-20

## 2017-11-18 RX ORDER — GLUCAGON INJECTION, SOLUTION 0.5 MG/.1ML
1 INJECTION, SOLUTION SUBCUTANEOUS ONCE
Qty: 0 | Refills: 0 | Status: DISCONTINUED | OUTPATIENT
Start: 2017-11-18 | End: 2017-11-20

## 2017-11-18 RX ORDER — ASPIRIN/CALCIUM CARB/MAGNESIUM 324 MG
81 TABLET ORAL DAILY
Qty: 0 | Refills: 0 | Status: DISCONTINUED | OUTPATIENT
Start: 2017-11-18 | End: 2017-11-20

## 2017-11-18 RX ORDER — INSULIN LISPRO 100/ML
12 VIAL (ML) SUBCUTANEOUS ONCE
Qty: 0 | Refills: 0 | Status: COMPLETED | OUTPATIENT
Start: 2017-11-18 | End: 2017-11-18

## 2017-11-18 RX ORDER — DEXTROSE 50 % IN WATER 50 %
12.5 SYRINGE (ML) INTRAVENOUS ONCE
Qty: 0 | Refills: 0 | Status: DISCONTINUED | OUTPATIENT
Start: 2017-11-18 | End: 2017-11-20

## 2017-11-18 RX ORDER — OXYCODONE AND ACETAMINOPHEN 5; 325 MG/1; MG/1
1 TABLET ORAL EVERY 6 HOURS
Qty: 0 | Refills: 0 | Status: DISCONTINUED | OUTPATIENT
Start: 2017-11-18 | End: 2017-11-20

## 2017-11-18 RX ORDER — METOPROLOL TARTRATE 50 MG
0 TABLET ORAL
Qty: 0 | Refills: 0 | COMMUNITY

## 2017-11-18 RX ORDER — METOPROLOL TARTRATE 50 MG
50 TABLET ORAL DAILY
Qty: 0 | Refills: 0 | Status: DISCONTINUED | OUTPATIENT
Start: 2017-11-18 | End: 2017-11-20

## 2017-11-18 RX ORDER — HYDROMORPHONE HYDROCHLORIDE 2 MG/ML
1 INJECTION INTRAMUSCULAR; INTRAVENOUS; SUBCUTANEOUS EVERY 4 HOURS
Qty: 0 | Refills: 0 | Status: DISCONTINUED | OUTPATIENT
Start: 2017-11-18 | End: 2017-11-19

## 2017-11-18 RX ORDER — HYDROMORPHONE HYDROCHLORIDE 2 MG/ML
0.5 INJECTION INTRAMUSCULAR; INTRAVENOUS; SUBCUTANEOUS ONCE
Qty: 0 | Refills: 0 | Status: DISCONTINUED | OUTPATIENT
Start: 2017-11-18 | End: 2017-11-18

## 2017-11-18 RX ADMIN — HEPARIN SODIUM 7500 UNIT(S): 5000 INJECTION INTRAVENOUS; SUBCUTANEOUS at 13:36

## 2017-11-18 RX ADMIN — HYDROMORPHONE HYDROCHLORIDE 0.5 MILLIGRAM(S): 2 INJECTION INTRAMUSCULAR; INTRAVENOUS; SUBCUTANEOUS at 19:32

## 2017-11-18 RX ADMIN — Medication 81 MILLIGRAM(S): at 12:29

## 2017-11-18 RX ADMIN — LIDOCAINE 1 PATCH: 4 CREAM TOPICAL at 23:51

## 2017-11-18 RX ADMIN — HYDROMORPHONE HYDROCHLORIDE 0.5 MILLIGRAM(S): 2 INJECTION INTRAMUSCULAR; INTRAVENOUS; SUBCUTANEOUS at 17:31

## 2017-11-18 RX ADMIN — Medication 20 MILLIGRAM(S): at 12:29

## 2017-11-18 RX ADMIN — DULOXETINE HYDROCHLORIDE 90 MILLIGRAM(S): 30 CAPSULE, DELAYED RELEASE ORAL at 12:29

## 2017-11-18 RX ADMIN — HYDROMORPHONE HYDROCHLORIDE 0.5 MILLIGRAM(S): 2 INJECTION INTRAMUSCULAR; INTRAVENOUS; SUBCUTANEOUS at 17:19

## 2017-11-18 RX ADMIN — OXYCODONE AND ACETAMINOPHEN 1 TABLET(S): 5; 325 TABLET ORAL at 06:04

## 2017-11-18 RX ADMIN — HYDROMORPHONE HYDROCHLORIDE 0.5 MILLIGRAM(S): 2 INJECTION INTRAMUSCULAR; INTRAVENOUS; SUBCUTANEOUS at 12:45

## 2017-11-18 RX ADMIN — HYDROMORPHONE HYDROCHLORIDE 0.5 MILLIGRAM(S): 2 INJECTION INTRAMUSCULAR; INTRAVENOUS; SUBCUTANEOUS at 12:40

## 2017-11-18 RX ADMIN — OXYCODONE AND ACETAMINOPHEN 1 TABLET(S): 5; 325 TABLET ORAL at 07:04

## 2017-11-18 RX ADMIN — LIDOCAINE 1 PATCH: 4 CREAM TOPICAL at 12:29

## 2017-11-18 RX ADMIN — HEPARIN SODIUM 7500 UNIT(S): 5000 INJECTION INTRAVENOUS; SUBCUTANEOUS at 06:05

## 2017-11-18 RX ADMIN — CYCLOBENZAPRINE HYDROCHLORIDE 5 MILLIGRAM(S): 10 TABLET, FILM COATED ORAL at 21:29

## 2017-11-18 RX ADMIN — Medication 12 UNIT(S): at 04:39

## 2017-11-18 RX ADMIN — Medication 4: at 12:32

## 2017-11-18 RX ADMIN — ATORVASTATIN CALCIUM 20 MILLIGRAM(S): 80 TABLET, FILM COATED ORAL at 21:28

## 2017-11-18 RX ADMIN — HEPARIN SODIUM 7500 UNIT(S): 5000 INJECTION INTRAVENOUS; SUBCUTANEOUS at 21:28

## 2017-11-18 RX ADMIN — Medication 50 MILLIGRAM(S): at 21:28

## 2017-11-18 RX ADMIN — HYDROMORPHONE HYDROCHLORIDE 0.5 MILLIGRAM(S): 2 INJECTION INTRAMUSCULAR; INTRAVENOUS; SUBCUTANEOUS at 20:26

## 2017-11-18 NOTE — PROGRESS NOTE ADULT - SUBJECTIVE AND OBJECTIVE BOX
Interval Events:  Patient seen and examined at bedside.  The patient still complains of pain at his left hip with difficulty bearing weight.  No fevers, chills, chest pain, shortness of breath.              Vital Signs Last 24 Hrs  T(C): 37.1 (18 Nov 2017 10:03), Max: 37.1 (18 Nov 2017 10:03)  T(F): 98.7 (18 Nov 2017 10:03), Max: 98.7 (18 Nov 2017 10:03)  HR: 88 (18 Nov 2017 10:03) (70 - 88)  BP: 106/66 (18 Nov 2017 10:03) (106/66 - 138/77)  BP(mean): --  RR: 17 (18 Nov 2017 10:03) (16 - 18)  SpO2: 93% (18 Nov 2017 10:03) (93% - 98%)        PHYSICAL EXAM:    General: Well developed; well nourished; in no acute distress  Neck: Supple; non tender; no masses  Respiratory: Clear to auscultation bilaterally without wheezing, rhonchi or rales  Cardiovascular: Regular rate and rhythm. S1 and S2 Normal; No murmurs, gallops or rubs  Gastrointestinal: Soft non-tender non-distended; Normal bowel sounds  Extremities: +left wrist mildly swollen.  Left hip tender to palpation  Neurological: Alert and oriented x3  Skin: Warm and dry. No obvious rash    LABS:      CBC Full  -  ( 18 Nov 2017 10:57 )  WBC Count : 8.3 K/uL  Hemoglobin : 12.5 g/dL  Hematocrit : 38.5 %  Platelet Count - Automated : 213 K/uL  Mean Cell Volume : 84.2 fL  Mean Cell Hemoglobin : 27.4 pg  Mean Cell Hemoglobin Concentration : 32.5 g/dL      11-18    135  |  99  |  24<H>  ----------------------------<  199<H>  4.3   |  25  |  1.01    Ca    8.9      18 Nov 2017 10:57    TPro  6.7  /  Alb  3.4  /  TBili  0.6  /  DBili  x   /  AST  18  /  ALT  24  /  AlkPhos  67  11-18    PT/INR - ( 18 Nov 2017 00:16 )   PT: 11.8 sec;   INR: 1.06          PTT - ( 18 Nov 2017 00:16 )  PTT:26.6 sec                  RADIOLOGY & ADDITIONAL STUDIES (The following images were personally reviewed):  Preliminary read of Ct Pelvis: No acute fracture Interval Events:  Patient seen and examined at bedside.  The patient still complains of pain at his left hip with difficulty bearing weight.  No fevers, chills, chest pain, shortness of breath.              Vital Signs Last 24 Hrs  T(C): 37.1 (18 Nov 2017 10:03), Max: 37.1 (18 Nov 2017 10:03)  T(F): 98.7 (18 Nov 2017 10:03), Max: 98.7 (18 Nov 2017 10:03)  HR: 88 (18 Nov 2017 10:03) (70 - 88)  BP: 106/66 (18 Nov 2017 10:03) (106/66 - 138/77)  BP(mean): --  RR: 17 (18 Nov 2017 10:03) (16 - 18)  SpO2: 93% (18 Nov 2017 10:03) (93% - 98%)        PHYSICAL EXAM:    General: Well developed; well nourished; in no acute distress  Neck: Supple; non tender; no masses  Respiratory: Clear to auscultation bilaterally without wheezing, rhonchi or rales  Cardiovascular: Regular rate and rhythm. S1 and S2 Normal; No murmurs, gallops or rubs  Gastrointestinal: Soft non-tender non-distended; Normal bowel sounds  Extremities: +left wrist mildly swollen.  Left hip tender to palpation  Neurological: Alert and oriented x3.  FROM of left wrist and hip.  Skin: Warm and dry. No obvious rash    LABS:      CBC Full  -  ( 18 Nov 2017 10:57 )  WBC Count : 8.3 K/uL  Hemoglobin : 12.5 g/dL  Hematocrit : 38.5 %  Platelet Count - Automated : 213 K/uL  Mean Cell Volume : 84.2 fL  Mean Cell Hemoglobin : 27.4 pg  Mean Cell Hemoglobin Concentration : 32.5 g/dL      11-18    135  |  99  |  24<H>  ----------------------------<  199<H>  4.3   |  25  |  1.01    Ca    8.9      18 Nov 2017 10:57    TPro  6.7  /  Alb  3.4  /  TBili  0.6  /  DBili  x   /  AST  18  /  ALT  24  /  AlkPhos  67  11-18    PT/INR - ( 18 Nov 2017 00:16 )   PT: 11.8 sec;   INR: 1.06          PTT - ( 18 Nov 2017 00:16 )  PTT:26.6 sec                  RADIOLOGY & ADDITIONAL STUDIES (The following images were personally reviewed):  Preliminary read of Ct Pelvis: No acute fracture

## 2017-11-18 NOTE — H&P ADULT - ASSESSMENT
61 yo M with hx of CAD s/p PCI (2012), DM2 with peripheral neuropathy, and Hyperlipidemia, and Depression here s/p mechanical fall and now having difficulty ambulating.

## 2017-11-18 NOTE — H&P ADULT - NSHPLABSRESULTS_GEN_ALL_CORE
.  LABS:                         12.5   9.4   )-----------( 215      ( 18 Nov 2017 00:16 )             38.9     11-18    134<L>  |  98  |  31<H>  ----------------------------<  235<H>  4.4   |  24  |  1.01    Ca    8.6      18 Nov 2017 00:16    TPro  6.7  /  Alb  3.4  /  TBili  0.6  /  DBili  x   /  AST  18  /  ALT  24  /  AlkPhos  67  11-18    PT/INR - ( 18 Nov 2017 00:16 )   PT: 11.8 sec;   INR: 1.06          PTT - ( 18 Nov 2017 00:16 )  PTT:26.6 sec    RADIOLOGY, EKG & ADDITIONAL TESTS: .  LABS:                         12.5   9.4   )-----------( 215      ( 18 Nov 2017 00:16 )             38.9     11-18    134<L>  |  98  |  31<H>  ----------------------------<  235<H>  4.4   |  24  |  1.01    Ca    8.6      18 Nov 2017 00:16    TPro  6.7  /  Alb  3.4  /  TBili  0.6  /  DBili  x   /  AST  18  /  ALT  24  /  AlkPhos  67  11-18    PT/INR - ( 18 Nov 2017 00:16 )   PT: 11.8 sec;   INR: 1.06          PTT - ( 18 Nov 2017 00:16 )  PTT:26.6 sec    RADIOLOGY, EKG & ADDITIONAL TESTS:    EXAM:  CT PELVIS BONY ONLY                          ******PRELIMINARY REPORT******      PROCEDURE DATE:  11/17/2017      FINDINGS:  Bones/joints: No fracture. No dislocation.  Soft tissues: Unremarkable.  Appendix: Normal appendix.  Bladder: Unremarkable. No stones.

## 2017-11-18 NOTE — H&P ADULT - PROBLEM SELECTOR PLAN 1
s/p mechanical from stairs and steps. CT pelvis negative for any fractures however official reading is pending.  - Follow with official report.  - Lidocaine patch and Percocet for pain control.  - Physical therapy referral

## 2017-11-18 NOTE — H&P ADULT - NSHPPHYSICALEXAM_GEN_ALL_CORE
.  VITAL SIGNS:  T(C): 36.7 (11-18-17 @ 03:55), Max: 36.9 (11-18-17 @ 01:57)  T(F): 98.1 (11-18-17 @ 03:55), Max: 98.5 (11-18-17 @ 01:57)  HR: 88 (11-18-17 @ 03:55) (70 - 88)  BP: 138/77 (11-18-17 @ 03:55) (123/70 - 138/77)  BP(mean): --  RR: 18 (11-18-17 @ 03:55) (16 - 18)  SpO2: 95% (11-18-17 @ 03:55) (95% - 98%)  Wt(kg): --    PHYSICAL EXAM:    Constitutional: Sitting comfortably in bed; NAD  Head: NC/AT  Eyes: PERRL, EOMI, clear conjunctiva  ENT: no nasal discharge, no oropharyngeal erythema or exudates; MMM  Neck: supple; no JVD  Respiratory: CTA B/L  Cardiac: +S1/S2; RRR  Gastrointestinal: soft, NT/ND; no rebound or guarding; +BSx4  Back: left lower back tenderness on palpation.  Extremities: Edema around left wrist, tenderness on palpation. No erythema, not warm to touch.   Musculoskeletal: Pain with raising of left leg  Vascular: 2 DP/PT pulses B/L

## 2017-11-18 NOTE — H&P ADULT - ATTENDING COMMENTS
patient seen and examined  reviewed vs, labs, relevant radiological reports/ studies ekg  agree w/ PE findings as above w/ additions/ exceptions/ pertinent findings:     1.  2.   3.   4. patient seen and examined  reviewed vs, labs, relevant radiological reports/ studies  agree w/ PE findings as above w/ additions/ exceptions/ pertinent findings: mild tenderness at left hand / wrist swelling on palpation but FROM, 5/5 hand  b/l; there is tenderness at the L hip region on raising the left leg    1. impaired ambulation: PT, pain control  2. DM: c/w pump while inpatient, on ISS; if pt is not dcd home and need to switch to basal - bolus insulin regimen while inpatient, then consult endocrinology   3. CAD: c/w home meds  4. anemia: plan as above

## 2017-11-18 NOTE — DIETITIAN INITIAL EVALUATION ADULT. - PROBLEM SELECTOR PLAN 2
Patient uses a insulin pump at home. Also has DM2 w/ peripheral neuropathy complications.  - c/w moderate ISS and monitor FS.   -continue with pt w/ home insulin pump while inpatient

## 2017-11-18 NOTE — DIETITIAN INITIAL EVALUATION ADULT. - NS AS NUTRI INTERV ED CONTENT
Nutrition relationship to health/disease/Recommended modifications/Purpose of the nutrition education/Survival information/Priority modifications/Dash/TLC, CST CHO diet; importance of compliance

## 2017-11-18 NOTE — H&P ADULT - PROBLEM SELECTOR PLAN 5
pseudohyponatremia 2/2 hyperglycemia.   - Better glucose control.  -F/u w/ AM BMP Hx of CAD s/p PCI, no angina at this time.  - c/w Metoprolol, Crestor.  - c/w Aspirin.  - Zetia not on formulary

## 2017-11-18 NOTE — DIETITIAN INITIAL EVALUATION ADULT. - OTHER INFO
61y/o M admitted with difficulty ambulating s/p mechanical fall. He is seen resting in bed. Reports a good appetite and intake; consuming ~75% of meals. Denies GI distress, current pain, and mechanical issues. PTA pt reports eating well and denies wt changes. He endorses being compliant with DM diet, but also endorses that last A1c was 10.9% which was taken last week (no current A1c in labs). Pt reports switching over to a new pump and is to get a CGM in the near future. He has received prior diet education and is aware of appropriate dietary restrictions. RD reinforced Dash/TLC, CST CHO diet and explained the importance of diet compliance with medication/monitoring. Pt expresses understanding. Will follow.

## 2017-11-18 NOTE — H&P ADULT - PROBLEM SELECTOR PLAN 2
Hx of CAD s/p PCI, no angina at this time.  - c/w Metoprolol, Crestor.  - c/w Aspirin.  - Zetia not on formulary Patient uses a insulin pump at home. Also has DM2 w/ peripheral neuropathy complications.  - c/w moderate ISS and monitor FS.   -continue with pt w/ home insulin pump while inpatient

## 2017-11-18 NOTE — H&P ADULT - PROBLEM SELECTOR PLAN 3
Patient uses a insulin pump at home. Also has DM2 w/ peripheral neuropathy complications.  - c/w moderate ISS and monitor FS.   - Ordered for Lantus 6 units QHS (home dosage for basal insulin) Patient uses a insulin pump at home. Also has DM2 w/ peripheral neuropathy complications.  - c/w moderate ISS and monitor FS.   -continue with pt'a home insulin pump while inpatient Patient uses a insulin pump at home. Also has DM2 w/ peripheral neuropathy complications.  - c/w moderate ISS and monitor FS.   -continue with pt w/ home insulin pump while inpatient see above

## 2017-11-18 NOTE — H&P ADULT - HISTORY OF PRESENT ILLNESS
61 yo M with hx of CAD s/p PCI (2012), DM2 with peripheral neuropathy, and Hyperlipidemia, and Depression was BIBA for left hip and back pain. Patient sustained a mechanical fall yesterday. His slipper got caught in the edge of the rug while going down the stairs and fell on his left side, down 4-5 steps. Subsequently, patient has noticed some swelling around his left wrist, pain in left hip and back and is now having difficulty with weight bearing. Patient took Flexeril, percocet (wife's meds) but had no relief. Denies any head trauma. No preceding symptoms before fall.     Upon initial presentation in ED, vitals were unremarkable. 61 yo M with hx of CAD s/p PCI (2012), DM2 with peripheral neuropathy, and Hyperlipidemia, and Depression was BIBA for left hip and back pain. Patient sustained a mechanical fall yesterday. His slipper got caught in the edge of the rug while going down the stairs and fell on his left side, down 4-5 steps. Subsequently, patient has noticed some swelling around his left wrist, pain in left hip and back and is now having difficulty with weight bearing. Patient took Flexeril, percocet (wife's meds) but had no relief. Denies any head trauma. No preceding symptoms before fall. No loss of consciousness.     Upon initial presentation in ED, vitals were unremarkable. No obvious fractures were seen on imaging studies though formal reading is pending. Patient was administered 2 tabs of Percocet and Dilaudid for pain. Patient also received one dose of Unasyn for ? cellulitis around left wrist. As per ED provider, patient was still experiencing pain with walking. Admitted to [facility] for further evaluation.

## 2017-11-18 NOTE — H&P ADULT - PROBLEM SELECTOR PLAN 4
Normocytic anemia, Hgb 12.8. but MCV normal.  - Will order Iron panel pseudohyponatremia 2/2 hyperglycemia.   - Better glucose control.  -F/u w/ AM BMP

## 2017-11-18 NOTE — DIETITIAN INITIAL EVALUATION ADULT. - PROBLEM SELECTOR PLAN 5
Hx of CAD s/p PCI, no angina at this time.  - c/w Metoprolol, Crestor.  - c/w Aspirin.  - Zetia not on formulary

## 2017-11-19 LAB
ANION GAP SERPL CALC-SCNC: 13 MMOL/L — SIGNIFICANT CHANGE UP (ref 5–17)
BUN SERPL-MCNC: 26 MG/DL — HIGH (ref 7–23)
CALCIUM SERPL-MCNC: 9.3 MG/DL — SIGNIFICANT CHANGE UP (ref 8.4–10.5)
CHLORIDE SERPL-SCNC: 101 MMOL/L — SIGNIFICANT CHANGE UP (ref 96–108)
CO2 SERPL-SCNC: 27 MMOL/L — SIGNIFICANT CHANGE UP (ref 22–31)
CREAT SERPL-MCNC: 0.98 MG/DL — SIGNIFICANT CHANGE UP (ref 0.5–1.3)
GLUCOSE BLDC GLUCOMTR-MCNC: 205 MG/DL — HIGH (ref 70–99)
GLUCOSE BLDC GLUCOMTR-MCNC: 216 MG/DL — HIGH (ref 70–99)
GLUCOSE BLDC GLUCOMTR-MCNC: 241 MG/DL — HIGH (ref 70–99)
GLUCOSE BLDC GLUCOMTR-MCNC: 75 MG/DL — SIGNIFICANT CHANGE UP (ref 70–99)
GLUCOSE SERPL-MCNC: 80 MG/DL — SIGNIFICANT CHANGE UP (ref 70–99)
HCT VFR BLD CALC: 39.5 % — SIGNIFICANT CHANGE UP (ref 39–50)
HGB BLD-MCNC: 13 G/DL — SIGNIFICANT CHANGE UP (ref 13–17)
MAGNESIUM SERPL-MCNC: 2.2 MG/DL — SIGNIFICANT CHANGE UP (ref 1.6–2.6)
MCHC RBC-ENTMCNC: 28.1 PG — SIGNIFICANT CHANGE UP (ref 27–34)
MCHC RBC-ENTMCNC: 32.9 G/DL — SIGNIFICANT CHANGE UP (ref 32–36)
MCV RBC AUTO: 85.5 FL — SIGNIFICANT CHANGE UP (ref 80–100)
PLATELET # BLD AUTO: 239 K/UL — SIGNIFICANT CHANGE UP (ref 150–400)
POTASSIUM SERPL-MCNC: 4.6 MMOL/L — SIGNIFICANT CHANGE UP (ref 3.5–5.3)
POTASSIUM SERPL-SCNC: 4.6 MMOL/L — SIGNIFICANT CHANGE UP (ref 3.5–5.3)
RBC # BLD: 4.62 M/UL — SIGNIFICANT CHANGE UP (ref 4.2–5.8)
RBC # FLD: 15 % — SIGNIFICANT CHANGE UP (ref 10.3–16.9)
SODIUM SERPL-SCNC: 141 MMOL/L — SIGNIFICANT CHANGE UP (ref 135–145)
WBC # BLD: 10.1 K/UL — SIGNIFICANT CHANGE UP (ref 3.8–10.5)
WBC # FLD AUTO: 10.1 K/UL — SIGNIFICANT CHANGE UP (ref 3.8–10.5)

## 2017-11-19 PROCEDURE — 73010 X-RAY EXAM OF SHOULDER BLADE: CPT | Mod: 26,LT

## 2017-11-19 PROCEDURE — 99232 SBSQ HOSP IP/OBS MODERATE 35: CPT | Mod: GC

## 2017-11-19 RX ORDER — DOCUSATE SODIUM 100 MG
100 CAPSULE ORAL THREE TIMES A DAY
Qty: 0 | Refills: 0 | Status: DISCONTINUED | OUTPATIENT
Start: 2017-11-19 | End: 2017-11-20

## 2017-11-19 RX ORDER — HYDROMORPHONE HYDROCHLORIDE 2 MG/ML
2 INJECTION INTRAMUSCULAR; INTRAVENOUS; SUBCUTANEOUS EVERY 4 HOURS
Qty: 0 | Refills: 0 | Status: DISCONTINUED | OUTPATIENT
Start: 2017-11-19 | End: 2017-11-20

## 2017-11-19 RX ORDER — SENNA PLUS 8.6 MG/1
2 TABLET ORAL AT BEDTIME
Qty: 0 | Refills: 0 | Status: DISCONTINUED | OUTPATIENT
Start: 2017-11-19 | End: 2017-11-20

## 2017-11-19 RX ORDER — HYDROMORPHONE HYDROCHLORIDE 2 MG/ML
2 INJECTION INTRAMUSCULAR; INTRAVENOUS; SUBCUTANEOUS EVERY 4 HOURS
Qty: 0 | Refills: 0 | Status: DISCONTINUED | OUTPATIENT
Start: 2017-11-19 | End: 2017-11-19

## 2017-11-19 RX ORDER — POLYETHYLENE GLYCOL 3350 17 G/17G
17 POWDER, FOR SOLUTION ORAL DAILY
Qty: 0 | Refills: 0 | Status: DISCONTINUED | OUTPATIENT
Start: 2017-11-19 | End: 2017-11-20

## 2017-11-19 RX ADMIN — HYDROMORPHONE HYDROCHLORIDE 1 MILLIGRAM(S): 2 INJECTION INTRAMUSCULAR; INTRAVENOUS; SUBCUTANEOUS at 01:45

## 2017-11-19 RX ADMIN — ATORVASTATIN CALCIUM 20 MILLIGRAM(S): 80 TABLET, FILM COATED ORAL at 21:10

## 2017-11-19 RX ADMIN — Medication 50 MILLIGRAM(S): at 21:12

## 2017-11-19 RX ADMIN — OXYCODONE AND ACETAMINOPHEN 1 TABLET(S): 5; 325 TABLET ORAL at 04:37

## 2017-11-19 RX ADMIN — HYDROMORPHONE HYDROCHLORIDE 2 MILLIGRAM(S): 2 INJECTION INTRAMUSCULAR; INTRAVENOUS; SUBCUTANEOUS at 22:15

## 2017-11-19 RX ADMIN — LIDOCAINE 1 PATCH: 4 CREAM TOPICAL at 12:09

## 2017-11-19 RX ADMIN — HEPARIN SODIUM 7500 UNIT(S): 5000 INJECTION INTRAVENOUS; SUBCUTANEOUS at 21:11

## 2017-11-19 RX ADMIN — HYDROMORPHONE HYDROCHLORIDE 1 MILLIGRAM(S): 2 INJECTION INTRAMUSCULAR; INTRAVENOUS; SUBCUTANEOUS at 11:52

## 2017-11-19 RX ADMIN — HEPARIN SODIUM 7500 UNIT(S): 5000 INJECTION INTRAVENOUS; SUBCUTANEOUS at 14:03

## 2017-11-19 RX ADMIN — HYDROMORPHONE HYDROCHLORIDE 2 MILLIGRAM(S): 2 INJECTION INTRAMUSCULAR; INTRAVENOUS; SUBCUTANEOUS at 22:00

## 2017-11-19 RX ADMIN — HEPARIN SODIUM 7500 UNIT(S): 5000 INJECTION INTRAVENOUS; SUBCUTANEOUS at 05:27

## 2017-11-19 RX ADMIN — DULOXETINE HYDROCHLORIDE 90 MILLIGRAM(S): 30 CAPSULE, DELAYED RELEASE ORAL at 12:09

## 2017-11-19 RX ADMIN — OXYCODONE AND ACETAMINOPHEN 1 TABLET(S): 5; 325 TABLET ORAL at 19:36

## 2017-11-19 RX ADMIN — HYDROMORPHONE HYDROCHLORIDE 2 MILLIGRAM(S): 2 INJECTION INTRAMUSCULAR; INTRAVENOUS; SUBCUTANEOUS at 15:47

## 2017-11-19 RX ADMIN — HYDROMORPHONE HYDROCHLORIDE 1 MILLIGRAM(S): 2 INJECTION INTRAMUSCULAR; INTRAVENOUS; SUBCUTANEOUS at 05:27

## 2017-11-19 RX ADMIN — Medication 81 MILLIGRAM(S): at 12:09

## 2017-11-19 RX ADMIN — Medication: at 22:29

## 2017-11-19 RX ADMIN — Medication 20 MILLIGRAM(S): at 12:09

## 2017-11-19 RX ADMIN — OXYCODONE AND ACETAMINOPHEN 1 TABLET(S): 5; 325 TABLET ORAL at 18:56

## 2017-11-19 RX ADMIN — HYDROMORPHONE HYDROCHLORIDE 2 MILLIGRAM(S): 2 INJECTION INTRAMUSCULAR; INTRAVENOUS; SUBCUTANEOUS at 15:18

## 2017-11-19 RX ADMIN — OXYCODONE AND ACETAMINOPHEN 1 TABLET(S): 5; 325 TABLET ORAL at 05:30

## 2017-11-19 RX ADMIN — HYDROMORPHONE HYDROCHLORIDE 1 MILLIGRAM(S): 2 INJECTION INTRAMUSCULAR; INTRAVENOUS; SUBCUTANEOUS at 10:50

## 2017-11-19 RX ADMIN — HYDROMORPHONE HYDROCHLORIDE 1 MILLIGRAM(S): 2 INJECTION INTRAMUSCULAR; INTRAVENOUS; SUBCUTANEOUS at 01:31

## 2017-11-19 RX ADMIN — HYDROMORPHONE HYDROCHLORIDE 1 MILLIGRAM(S): 2 INJECTION INTRAMUSCULAR; INTRAVENOUS; SUBCUTANEOUS at 06:30

## 2017-11-19 NOTE — PHYSICAL THERAPY INITIAL EVALUATION ADULT - ADDITIONAL COMMENTS
Pt lives at home with spouse, 4STE x 3, denies elevator access (pt in private home). PTA; independent with functional mobility, though slow to amb, due to right knee discomfort, awaiting surgery in future.

## 2017-11-19 NOTE — PROGRESS NOTE ADULT - PROBLEM SELECTOR PROBLEM 2
Type 2 diabetes mellitus with neurological manifestations
Type 2 diabetes mellitus with neurological manifestations

## 2017-11-19 NOTE — PHYSICAL THERAPY INITIAL EVALUATION ADULT - ACTIVE RANGE OF MOTION EXAMINATION, REHAB EVAL
bilateral upper extremity Active ROM was WFL (within functional limits)/Left UE NT due to pain of trunk/bilateral  lower extremity Active ROM was WFL (within functional limits)

## 2017-11-19 NOTE — PROGRESS NOTE ADULT - PROBLEM SELECTOR PLAN 2
-Patient on his insulin pump from home.  Finger sticks are better controlled.
The patient should restart his insulin pump while he is here.

## 2017-11-19 NOTE — PHYSICAL THERAPY INITIAL EVALUATION ADULT - GENERAL OBSERVATIONS, REHAB EVAL
Rec'd pt seated at EOB, +heplock, cleared for PT andf OOB activity by Rn (Twan). C/o pain trunk and back region 6/10 RN (Twan) and Dr. Josue) aware. Agreeable for PT and OOB activity.

## 2017-11-19 NOTE — PROGRESS NOTE ADULT - PROBLEM SELECTOR PLAN 1
The patient is now having difficulty bearing weight on his left hip due to significant pain.  Dilaudid increased to 2mg every 4 hours PRN for pain as he is having difficulty ambulating.  Pain management to be consulted to evaluation patient and recommend a regimen especially for discharge.   -No fracture noted on CT Pelvis  -Physical therapy evaluated patient and he had difficulty walking.  Not recommending home for discharge yet as he has stairs to walk at home.  After his pain is better controlled they will reevaluate him tomorrow.
The patient is now having difficulty bearing weight on his left hip due to significant pain.  The percocet right now is not helping the pain.  Prelim read on the CT pelvis is negative for fracture.    -Get official read from attending radiologist for CT pelvis and wrist xray  -Add dilaudid 0.5mg IV q4 PRN for severe pain and percoet q4 PRN for moderate pain.  -Physical therapy to evaluate his ambulation and for discharge planning.

## 2017-11-19 NOTE — PHYSICAL THERAPY INITIAL EVALUATION ADULT - GAIT DEVIATIONS NOTED, PT EVAL
decreased weight-shifting ability/decreased step length/decreased louann/decreased stride length/decrease bilateral Heelstrike

## 2017-11-19 NOTE — PROGRESS NOTE ADULT - PROBLEM SELECTOR PLAN 3
Heparin subcutaneously for DVT PPX  Incentive Spirometer use.
Heparin subcutaneously for DVT PPX  Incentive Spirometer use.

## 2017-11-19 NOTE — PROGRESS NOTE ADULT - ASSESSMENT
62 year old man with diabetes, CAD, osteoarthritis, presents with mechanical fall.
62 year old man with diabetes, CAD, osteoarthritis, presents with mechanical fall.

## 2017-11-19 NOTE — PROGRESS NOTE ADULT - SUBJECTIVE AND OBJECTIVE BOX
Interval Events:  Patient seen and examined at bedside.  Patient still complaining of pain at his left him and mid scapular region.  Somewhat improved, however he is having difficulty walking due to the pain.               Vital Signs Last 24 Hrs  T(C): 36.8 (19 Nov 2017 10:07), Max: 36.9 (18 Nov 2017 15:10)  T(F): 98.2 (19 Nov 2017 10:07), Max: 98.4 (18 Nov 2017 15:10)  HR: 81 (19 Nov 2017 10:07) (81 - 98)  BP: 135/84 (19 Nov 2017 10:07) (131/74 - 149/76)  BP(mean): --  RR: 19 (19 Nov 2017 10:07) (16 - 19)  SpO2: 96% (19 Nov 2017 10:07) (94% - 96%)        PHYSICAL EXAM:    General: Well developed; well nourished; in no acute distress  Neck: Supple; non tender; no masses  Respiratory: Clear to auscultation bilaterally without wheezing, rhonchi or rales  Cardiovascular: Regular rate and rhythm. S1 and S2 Normal; No murmurs, gallops or rubs  Gastrointestinal: Soft non-tender non-distended; Normal bowel sounds  Extremities: mildly swollen left wrist.  mid left scapula tender to palpation.  left hip tender to palpation.  FROM of all extremities.  Neurological: Alert and oriented x3  Skin: Warm and dry. No obvious rash    LABS:      CBC Full  -  ( 19 Nov 2017 06:30 )  WBC Count : 10.1 K/uL  Hemoglobin : 13.0 g/dL  Hematocrit : 39.5 %  Platelet Count - Automated : 239 K/uL  Mean Cell Volume : 85.5 fL  Mean Cell Hemoglobin : 28.1 pg  Mean Cell Hemoglobin Concentration : 32.9 g/dL    11-19    141  |  101  |  26<H>  ----------------------------<  80  4.6   |  27  |  0.98    Ca    9.3      19 Nov 2017 06:30  Mg     2.2     11-19    TPro  6.7  /  Alb  3.4  /  TBili  0.6  /  DBili  x   /  AST  18  /  ALT  24  /  AlkPhos  67  11-18    PT/INR - ( 18 Nov 2017 00:16 )   PT: 11.8 sec;   INR: 1.06          PTT - ( 18 Nov 2017 00:16 )  PTT:26.6 sec                  RADIOLOGY & ADDITIONAL STUDIES (The following images were personally reviewed):  XRAY Hip: no fracture  XRAY elbow: no fracture

## 2017-11-19 NOTE — PROVIDER CONTACT NOTE (OTHER) - ACTION/TREATMENT ORDERED:
Dr Dougherty notified and evaluate patient prior to pain meds to be given
Lispro Humalog insulin 12 units subcutaneous time 1

## 2017-11-19 NOTE — PHYSICAL THERAPY INITIAL EVALUATION ADULT - PERTINENT HX OF CURRENT PROBLEM, REHAB EVAL
61 yo M BIBA for left hip and back pain. Patient sustained a mechanical fall yesterday. His slipper got caught in the edge of the rug while going down the stairs and fell on his left side, down 4-5 steps. Subsequently, patient has noticed some swelling around his left wrist, pain in left hip and back and is now having difficulty with weight bearing.

## 2017-11-20 ENCOUNTER — TRANSCRIPTION ENCOUNTER (OUTPATIENT)
Age: 62
End: 2017-11-20

## 2017-11-20 VITALS
DIASTOLIC BLOOD PRESSURE: 64 MMHG | TEMPERATURE: 98 F | SYSTOLIC BLOOD PRESSURE: 125 MMHG | RESPIRATION RATE: 17 BRPM | HEART RATE: 77 BPM | OXYGEN SATURATION: 92 %

## 2017-11-20 LAB
ANION GAP SERPL CALC-SCNC: 13 MMOL/L — SIGNIFICANT CHANGE UP (ref 5–17)
BUN SERPL-MCNC: 31 MG/DL — HIGH (ref 7–23)
CALCIUM SERPL-MCNC: 9.5 MG/DL — SIGNIFICANT CHANGE UP (ref 8.4–10.5)
CHLORIDE SERPL-SCNC: 96 MMOL/L — SIGNIFICANT CHANGE UP (ref 96–108)
CO2 SERPL-SCNC: 28 MMOL/L — SIGNIFICANT CHANGE UP (ref 22–31)
CREAT SERPL-MCNC: 1 MG/DL — SIGNIFICANT CHANGE UP (ref 0.5–1.3)
GLUCOSE BLDC GLUCOMTR-MCNC: 242 MG/DL — HIGH (ref 70–99)
GLUCOSE BLDC GLUCOMTR-MCNC: 250 MG/DL — HIGH (ref 70–99)
GLUCOSE BLDC GLUCOMTR-MCNC: 332 MG/DL — HIGH (ref 70–99)
GLUCOSE SERPL-MCNC: 233 MG/DL — HIGH (ref 70–99)
HCT VFR BLD CALC: 40.2 % — SIGNIFICANT CHANGE UP (ref 39–50)
HGB BLD-MCNC: 12.9 G/DL — LOW (ref 13–17)
MAGNESIUM SERPL-MCNC: 2.2 MG/DL — SIGNIFICANT CHANGE UP (ref 1.6–2.6)
MCHC RBC-ENTMCNC: 27.4 PG — SIGNIFICANT CHANGE UP (ref 27–34)
MCHC RBC-ENTMCNC: 32.1 G/DL — SIGNIFICANT CHANGE UP (ref 32–36)
MCV RBC AUTO: 85.4 FL — SIGNIFICANT CHANGE UP (ref 80–100)
PLATELET # BLD AUTO: 244 K/UL — SIGNIFICANT CHANGE UP (ref 150–400)
POTASSIUM SERPL-MCNC: 5.1 MMOL/L — SIGNIFICANT CHANGE UP (ref 3.5–5.3)
POTASSIUM SERPL-SCNC: 5.1 MMOL/L — SIGNIFICANT CHANGE UP (ref 3.5–5.3)
RBC # BLD: 4.71 M/UL — SIGNIFICANT CHANGE UP (ref 4.2–5.8)
RBC # FLD: 14.7 % — SIGNIFICANT CHANGE UP (ref 10.3–16.9)
SODIUM SERPL-SCNC: 137 MMOL/L — SIGNIFICANT CHANGE UP (ref 135–145)
WBC # BLD: 11.6 K/UL — HIGH (ref 3.8–10.5)
WBC # FLD AUTO: 11.6 K/UL — HIGH (ref 3.8–10.5)

## 2017-11-20 PROCEDURE — 99239 HOSP IP/OBS DSCHRG MGMT >30: CPT

## 2017-11-20 PROCEDURE — 84466 ASSAY OF TRANSFERRIN: CPT

## 2017-11-20 PROCEDURE — 93306 TTE W/DOPPLER COMPLETE: CPT | Mod: 26

## 2017-11-20 PROCEDURE — 85025 COMPLETE CBC W/AUTO DIFF WBC: CPT

## 2017-11-20 PROCEDURE — 72192 CT PELVIS W/O DYE: CPT

## 2017-11-20 PROCEDURE — 93010 ELECTROCARDIOGRAM REPORT: CPT

## 2017-11-20 PROCEDURE — 36415 COLL VENOUS BLD VENIPUNCTURE: CPT

## 2017-11-20 PROCEDURE — 80053 COMPREHEN METABOLIC PANEL: CPT

## 2017-11-20 PROCEDURE — 97116 GAIT TRAINING THERAPY: CPT

## 2017-11-20 PROCEDURE — 85730 THROMBOPLASTIN TIME PARTIAL: CPT

## 2017-11-20 PROCEDURE — 82728 ASSAY OF FERRITIN: CPT

## 2017-11-20 PROCEDURE — 85027 COMPLETE CBC AUTOMATED: CPT

## 2017-11-20 PROCEDURE — C8929: CPT

## 2017-11-20 PROCEDURE — 83735 ASSAY OF MAGNESIUM: CPT

## 2017-11-20 PROCEDURE — 93005 ELECTROCARDIOGRAM TRACING: CPT

## 2017-11-20 PROCEDURE — 97161 PT EVAL LOW COMPLEX 20 MIN: CPT

## 2017-11-20 PROCEDURE — 85610 PROTHROMBIN TIME: CPT

## 2017-11-20 PROCEDURE — 96374 THER/PROPH/DIAG INJ IV PUSH: CPT

## 2017-11-20 PROCEDURE — 73502 X-RAY EXAM HIP UNI 2-3 VIEWS: CPT

## 2017-11-20 PROCEDURE — 99285 EMERGENCY DEPT VISIT HI MDM: CPT | Mod: 48

## 2017-11-20 PROCEDURE — 73010 X-RAY EXAM OF SHOULDER BLADE: CPT

## 2017-11-20 PROCEDURE — 82962 GLUCOSE BLOOD TEST: CPT

## 2017-11-20 PROCEDURE — 80048 BASIC METABOLIC PNL TOTAL CA: CPT

## 2017-11-20 PROCEDURE — 96375 TX/PRO/DX INJ NEW DRUG ADDON: CPT

## 2017-11-20 PROCEDURE — 99254 IP/OBS CNSLTJ NEW/EST MOD 60: CPT

## 2017-11-20 PROCEDURE — 73080 X-RAY EXAM OF ELBOW: CPT

## 2017-11-20 PROCEDURE — 83550 IRON BINDING TEST: CPT

## 2017-11-20 PROCEDURE — 73110 X-RAY EXAM OF WRIST: CPT

## 2017-11-20 RX ORDER — OXYCODONE AND ACETAMINOPHEN 5; 325 MG/1; MG/1
2 TABLET ORAL EVERY 4 HOURS
Qty: 0 | Refills: 0 | Status: DISCONTINUED | OUTPATIENT
Start: 2017-11-20 | End: 2017-11-20

## 2017-11-20 RX ORDER — HYDROMORPHONE HYDROCHLORIDE 2 MG/ML
1 INJECTION INTRAMUSCULAR; INTRAVENOUS; SUBCUTANEOUS
Qty: 0 | Refills: 0 | Status: DISCONTINUED | OUTPATIENT
Start: 2017-11-20 | End: 2017-11-20

## 2017-11-20 RX ORDER — HYDRALAZINE HCL 50 MG
25 TABLET ORAL EVERY 12 HOURS
Qty: 0 | Refills: 0 | Status: DISCONTINUED | OUTPATIENT
Start: 2017-11-20 | End: 2017-11-20

## 2017-11-20 RX ORDER — LIDOCAINE 4 G/100G
1 CREAM TOPICAL
Qty: 30 | Refills: 0 | OUTPATIENT
Start: 2017-11-20 | End: 2017-12-20

## 2017-11-20 RX ORDER — ISOSORBIDE MONONITRATE 60 MG/1
30 TABLET, EXTENDED RELEASE ORAL DAILY
Qty: 0 | Refills: 0 | Status: DISCONTINUED | OUTPATIENT
Start: 2017-11-20 | End: 2017-11-20

## 2017-11-20 RX ORDER — HYDRALAZINE HCL 50 MG
1 TABLET ORAL
Qty: 60 | Refills: 0 | OUTPATIENT
Start: 2017-11-20 | End: 2017-12-20

## 2017-11-20 RX ORDER — OXYCODONE AND ACETAMINOPHEN 5; 325 MG/1; MG/1
1 TABLET ORAL EVERY 4 HOURS
Qty: 0 | Refills: 0 | Status: DISCONTINUED | OUTPATIENT
Start: 2017-11-20 | End: 2017-11-20

## 2017-11-20 RX ORDER — ISOSORBIDE MONONITRATE 60 MG/1
1 TABLET, EXTENDED RELEASE ORAL
Qty: 30 | Refills: 0 | OUTPATIENT
Start: 2017-11-20 | End: 2017-12-20

## 2017-11-20 RX ADMIN — LIDOCAINE 1 PATCH: 4 CREAM TOPICAL at 00:00

## 2017-11-20 RX ADMIN — HYDROMORPHONE HYDROCHLORIDE 2 MILLIGRAM(S): 2 INJECTION INTRAMUSCULAR; INTRAVENOUS; SUBCUTANEOUS at 03:40

## 2017-11-20 RX ADMIN — OXYCODONE AND ACETAMINOPHEN 2 TABLET(S): 5; 325 TABLET ORAL at 12:00

## 2017-11-20 RX ADMIN — HYDROMORPHONE HYDROCHLORIDE 2 MILLIGRAM(S): 2 INJECTION INTRAMUSCULAR; INTRAVENOUS; SUBCUTANEOUS at 08:10

## 2017-11-20 RX ADMIN — Medication 8: at 17:27

## 2017-11-20 RX ADMIN — Medication 20 MILLIGRAM(S): at 13:36

## 2017-11-20 RX ADMIN — LIDOCAINE 1 PATCH: 4 CREAM TOPICAL at 13:37

## 2017-11-20 RX ADMIN — HYDROMORPHONE HYDROCHLORIDE 2 MILLIGRAM(S): 2 INJECTION INTRAMUSCULAR; INTRAVENOUS; SUBCUTANEOUS at 03:24

## 2017-11-20 RX ADMIN — HEPARIN SODIUM 7500 UNIT(S): 5000 INJECTION INTRAVENOUS; SUBCUTANEOUS at 06:29

## 2017-11-20 RX ADMIN — Medication 4: at 13:38

## 2017-11-20 RX ADMIN — DULOXETINE HYDROCHLORIDE 90 MILLIGRAM(S): 30 CAPSULE, DELAYED RELEASE ORAL at 13:35

## 2017-11-20 RX ADMIN — OXYCODONE AND ACETAMINOPHEN 2 TABLET(S): 5; 325 TABLET ORAL at 15:33

## 2017-11-20 RX ADMIN — Medication 81 MILLIGRAM(S): at 13:34

## 2017-11-20 RX ADMIN — OXYCODONE AND ACETAMINOPHEN 2 TABLET(S): 5; 325 TABLET ORAL at 11:21

## 2017-11-20 RX ADMIN — HEPARIN SODIUM 7500 UNIT(S): 5000 INJECTION INTRAVENOUS; SUBCUTANEOUS at 13:37

## 2017-11-20 RX ADMIN — OXYCODONE AND ACETAMINOPHEN 2 TABLET(S): 5; 325 TABLET ORAL at 16:30

## 2017-11-20 RX ADMIN — Medication 4: at 09:16

## 2017-11-20 RX ADMIN — HYDROMORPHONE HYDROCHLORIDE 2 MILLIGRAM(S): 2 INJECTION INTRAMUSCULAR; INTRAVENOUS; SUBCUTANEOUS at 07:54

## 2017-11-20 NOTE — DISCHARGE NOTE ADULT - CARE PROVIDER_API CALL
Eliezer Lane), Internal Medicine  [address] [place] [address]  Phone: [phone]  Fax: [phone] Lilia Caldera (MD), Cardiovascular Disease; Internal Medicine  [address]  [address] [place] [address]  Phone: [phone]  Fax: [phone]

## 2017-11-20 NOTE — CONSULT NOTE ADULT - SUBJECTIVE AND OBJECTIVE BOX
CHIEF COMPLAINT:  Chest pain  HISTORY OF PRESENT ILLNESS:  63 yo M with hx of CAD s/p PCI (2012), DM2 with peripheral neuropathy, and Hyperlipidemia, and Depression was BIBA for left hip and back pain. The patient has a history of hypertension. Patient has history of an anterior wall MI treated with acute stenting. He had in-stent restenosis in September 2013 and was revascularized with angioplasty. Nuclear stress test in August 2017 showed a large anterolateral defect with mild ischemia. It was unchanged from the prior. The patient was fairly active prior to his fall and had no chest pain or dyspnea.Patient sustained a mechanical fall yesterday. His slipper got caught in the edge of the rug while going down the stairs and fell on his left side, down 4-5 steps. Subsequently, patient has noticed some swelling around his left wrist, pain in left hip and back and is now having difficulty with weight bearing. Patient took Flexeril, percocet (wife's meds) but had no relief. Denies any head trauma. No preceding symptoms before fall. No loss of consciousness. This morning the patient developed chest discomfort on and off. It was positional and his chest was tender. He has been walking without difficulty. He is dizzy after taking medication.    Chart reviewed  PAST MEDICAL & SURGICAL HISTORY:  Type 2 diabetes mellitus with neurological manifestations: Diabetic neuropathy with neurologic complication  Type 2 diabetes mellitus: DM (diabetes mellitus), type 2  History of surgery to heart and great vessels, presenting hazards to health: x 4 in 2005  Atherosclerosis of coronary artery: CAD (coronary artery disease)  Status post percutaneous transluminal coronary angioplasty: in 2012  History of surgery to major organs, presenting hazards to health: x 2  Other postprocedural status: Fixation hardware in foot      [ x ] Diabetes   [ x ] Hypertension  [x  ] Hyperlipidemia  [x  ] CAD  [ x ] PCI  [  ] CABG    PREVIOUS DIAGNOSTIC TESTING:    [x  ] Echocardiogram:  [x  ]  Catheterization:  [ x ] Stress Test:  	    MEDICATIONS:  metoprolol succinate ER 50 milliGRAM(s) Oral daily        cyclobenzaprine 5 milliGRAM(s) Oral three times a day PRN  DULoxetine 90 milliGRAM(s) Oral daily  HYDROmorphone  Injectable 2 milliGRAM(s) IV Push every 4 hours PRN  oxyCODONE    5 mG/acetaminophen 325 mG 1 Tablet(s) Oral every 6 hours PRN  PARoxetine 20 milliGRAM(s) Oral daily    docusate sodium 100 milliGRAM(s) Oral three times a day  polyethylene glycol 3350 17 Gram(s) Oral daily  senna 2 Tablet(s) Oral at bedtime    atorvastatin 20 milliGRAM(s) Oral at bedtime  dextrose 50% Injectable 12.5 Gram(s) IV Push once  dextrose 50% Injectable 25 Gram(s) IV Push once  dextrose 50% Injectable 25 Gram(s) IV Push once  dextrose Gel 1 Dose(s) Oral once PRN  glucagon  Injectable 1 milliGRAM(s) IntraMuscular once PRN  insulin lispro (HumaLOG) corrective regimen sliding scale   SubCutaneous Before meals and at bedtime    aspirin enteric coated 81 milliGRAM(s) Oral daily  dextrose 5%. 1000 milliLiter(s) IV Continuous <Continuous>  heparin  Injectable 7500 Unit(s) SubCutaneous every 8 hours  lidocaine   Patch 1 Patch Transdermal daily      FAMILY HISTORY:      SOCIAL HISTORY:    [  ] Never smoker  [  ] Non-smoker  [  ] Smoker  [x  ] Social alcohol use  [x  ] Former smoker    Allergies    angiotensin converting enzyme inhibitors (Unknown)  enalapril (Hives)    Intolerances    	    REVIEW OF SYSTEMS:  CONSTITUTIONAL: No fever, weight loss, or fatigue  EYES: No eye pain, visual disturbances, or discharge  ENT:  No difficulty hearing, tinnitus, vertigo; No sinus or throat pain  NECK: No pain or stiffness  PULMONARY: No cough, no wheezing, chills or hemoptysis; No Shortness of Breath  CARDIOVASCULAR: +chest pain, no  palpitations, no passing out, dizziness, or leg swelling  GASTROINTESTINAL: No abdominal  pain. No nausea, vomiting, or hematemesis; No diarrhea or constipation. No melena or hematochezia.  GENITOURINARY: No dysuria, frequency, hematuria, or incontinence  NEUROLOGICAL: No headaches, memory loss, loss of strength, numbness, or tremors  SKIN: No itching, burning, rashes, or lesions   LYMPH Nodes: No enlarged glands  ENDOCRINE: No heat or cold intolerance; No hair loss  MUSCULOSKELETAL: +joint pain or swelling; +muscle, back, -extremity pain  PSYCHIATRIC: No depression, anxiety, mood swings, or difficulty sleeping  HEME/LYMPH: No easy bruising, or bleeding gums  ALLERGY AND IMMUNOLOGIC: No hives or eczema	    PHYSICAL EXAM:  T(C): 36.2 (11-20-17 @ 08:35), Max: 36.9 (11-19-17 @ 21:14)  HR: 68 (11-20-17 @ 08:35) (68 - 89)  BP: 119/70 (11-20-17 @ 08:35) (119/70 - 161/84)  RR: 18 (11-20-17 @ 08:35) (16 - 18)  SpO2: 94% (11-20-17 @ 08:35) (94% - 98%)  Wt(kg): --  I&O's Summary      Appearance: Normal	  HEENT:   Normal oral mucosa, PERRL, EOMI	  Lymphatic: No lymphadenopathy  Cardiovascular: Normal S1 S2, No JVD, No murmur, No edema  Pulmonary: Lungs clear to auscultation	  Psychiatry: A & O x 3, Mood & affect appropriate  Gastrointestinal:  Soft, Non-tender, + BS	  Skin: No rashes, No ecchymoses, No cyanosis	  Neurologic: Non-focal  Extremities: Normal range of motion, No clubbing or cyanosis  	 	  CARDIAC MARKERS:                                12.9   11.6  )-----------( 244      ( 20 Nov 2017 06:39 )             40.2     11-20    137  |  96  |  31<H>  ----------------------------<  233<H>  5.1   |  28  |  1.00    Ca    9.5      20 Nov 2017 06:41  Mg     2.2     11-20      proBNP:  Lipid Profile:  HgA1c:  TSH:    TELEMETRY: 	    ECG:  	  RADIOLOGY:	    ASSESSMENT/PLAN: 	    Coronary artery disease-last nuclear stress test did not show ischemia. The patient's chest pain is atypical/non-anginal. Please check EKG.     cardiomyopathy -nuclear stress test showed a severely reduced ejection fraction. Check echocardiogram to confirm. Electrophysiology consult. Please start hydralazine 25 mg twice a day and Imdur 30 mg daily. Consults allergist about ACE inhibitor allergy.     Fall-no lightheadedness. Mechanical fall.    Diabetes-follow sugars.    Hypertension-labile but acceptable. Will improve with hydralazine.

## 2017-11-20 NOTE — DISCHARGE NOTE ADULT - SECONDARY DIAGNOSIS.
Impaired ambulation Type 2 diabetes mellitus Atherosclerosis of coronary artery Hyperlipidemia Depression Hypertension

## 2017-11-20 NOTE — DISCHARGE NOTE ADULT - ADDITIONAL INSTRUCTIONS
Follow-up with your PCP (Dr. Lane) in Follow-up with your PCP/Cardiologist (Dr. Caldera) on January 18th at 2pm.

## 2017-11-20 NOTE — DISCHARGE NOTE ADULT - CARE PROVIDERS DIRECT ADDRESSES
,DirectAddress_Unknown ,[email or web address], [email or web address]

## 2017-11-20 NOTE — DISCHARGE NOTE ADULT - MEDICATION SUMMARY - MEDICATIONS TO TAKE
I will START or STAY ON the medications listed below when I get home from the hospital:    aspirin 81 mg oral tablet  -- 1 tab(s) by mouth once a day  -- Indication: For CAD    oxyCODONE-acetaminophen 5 mg-325 mg oral tablet  -- 2 tab(s) by mouth every 4 hours, As needed, Severe Pain (7 - 10)  -- Indication: For Pain    oxyCODONE-acetaminophen 5 mg-325 mg oral tablet  -- 1 tab(s) by mouth every 4 hours, As needed, Moderate Pain (4 - 6)  -- Indication: For Pain    Paxil 20 mg oral tablet  -- 1 tab(s) by mouth once a day  -- Indication: For Depression    Cymbalta  -- 90 milligram(s) by mouth once a day  -- Indication: For Depression    HumaLOG  -- Pump  -- Indication: For Diabetes    Zetia 10 mg oral tablet  -- 1 tab(s) by mouth once a day  -- Indication: For Diabetes    Crestor 5 mg oral tablet  -- 1 tab(s) by mouth once a day (at bedtime)  -- Indication: For HLD    Metoprolol Succinate ER 50 mg oral tablet, extended release  -- 1 tab(s) by mouth once a day  -- Indication: For CAD    lidocaine 5% topical film  -- Apply on skin to affected area once a day MDD:12hrs on, 12hrs off  -- Indication: For Pain I will START or STAY ON the medications listed below when I get home from the hospital:    aspirin 81 mg oral tablet  -- 1 tab(s) by mouth once a day  -- Indication: For CAD    Paxil 20 mg oral tablet  -- 1 tab(s) by mouth once a day  -- Indication: For Depression    Cymbalta  -- 90 milligram(s) by mouth once a day  -- Indication: For Depression    HumaLOG  -- Pump  -- Indication: For Diabetes    Crestor 5 mg oral tablet  -- 1 tab(s) by mouth once a day (at bedtime)  -- Indication: For HLD    Zetia 10 mg oral tablet  -- 1 tab(s) by mouth once a day  -- Indication: For Diabetes    Metoprolol Succinate ER 50 mg oral tablet, extended release  -- 1 tab(s) by mouth once a day  -- Indication: For CAD    lidocaine 5% topical film  -- Apply on skin to affected area once a day MDD:12hrs on, 12hrs off  -- Indication: For Pain

## 2017-11-20 NOTE — DISCHARGE NOTE ADULT - PATIENT PORTAL LINK FT
“You can access the [facility] Patient Portal, offered by [facility], by registering with the following website: [email or web address]”

## 2017-11-20 NOTE — DISCHARGE NOTE ADULT - HOSPITAL COURSE
63 yo M with hx of CAD s/p PCI (2012, last cath 2013), DM2 with peripheral neuropathy, and Hyperlipidemia, and Depression was BIBA for left hip and back pain. Patient sustained a mechanical fall down 4-5 steps, landing on left side, and noticed some swelling around his left wrist, pain in left hip and back and is difficulty with weight bearing. Patient took Flexeril, percocet (wife's meds) but had no relief. In the ED, patient was HDS with unremarkable labs. CT head was negative. X-ray imaging of L hip and L wrist without fracture or dislocation. CT L Hip negative for fracture or acute pathology. Patient started on pain regimen with Dilaudid 0.5mg q4hrs for severe pain and percocet for moderate pain, which was subsequently increased to 1mg q4hrs and then 2mg q4hrs for continued severe pain. Also started on Flexeril for muscle spasms. Patient was seen by physical therapy but was unable to weight bear so it was deferred. Patient also complaining of severe positional scapular pain. X-ray scapula negative for fracture. Pain improved over the course of 2 days and patient switched to PO pain medication and seen by pain management for home regimen. Patient was seen by physical therapy again and was able to weight bear. Cardiology was consulted for possible chest pain, although unlikely ACS given positional and reproducible; EKG unchanged from previous.  Last nuclear stress test did not show ischemia, however, did show a severely reduced ejection fraction. Patient also with labile BPs and started on hydralazine 25 mg twice a day and Imdur 30 mg daily. Patient to be discharged with outpatient cardiology follow-up for repeat ECHO and allergy assessment given h/o ACE inhibitor allergy. Patient clinically improved and was ready for discharge to .... on 11/20 with outpatient follow-up. 61 yo M with hx of HFrEF (35%), CAD s/p PCI (2012, last cath 2013), DM2 with peripheral neuropathy, and Hyperlipidemia, and Depression was BIBA for left hip and back pain. Patient sustained a mechanical fall down 4-5 steps, landing on left side, and noticed some swelling around his left wrist, pain in left hip and back and is difficulty with weight bearing. Patient took Flexeril, percocet (wife's meds) but had no relief. In the ED, patient was HDS with unremarkable labs. CT head was negative. X-ray imaging of L hip and L wrist without fracture or dislocation. CT L Hip negative for fracture or acute pathology. Patient started on pain regimen with Dilaudid 0.5mg q4hrs for severe pain and percocet for moderate pain, which was subsequently increased to 1mg q4hrs and then 2mg q4hrs for continued severe pain. Also started on Flexeril for muscle spasms. Patient was seen by physical therapy but was unable to weight bear so it was deferred. Patient also complaining of severe positional scapular pain. X-ray scapula negative for fracture. Pain improved over the course of 2 days and patient switched to PO pain medication and seen by pain management for home regimen. Patient was seen by physical therapy again and was able to weight bear. Cardiology was consulted for possible chest pain, although unlikely ACS given positional and reproducible; EKG unchanged from previous.  Last nuclear stress test did not show ischemia, however, did show a severely reduced ejection fraction. ECHO this admission showing apical hypokinesis with EF 25-30%.  Patient also with labile BPs and started on hydralazine 25 mg twice a day and Imdur 30 mg daily. Patient to be discharged with outpatient cardiology follow-up and allergy assessment given h/o ACE inhibitor allergy. Patient clinically improved and was ready for discharge to home with home PT on 11/20 with outpatient follow-up.

## 2017-11-20 NOTE — DISCHARGE NOTE ADULT - CARE PLAN
Principal Discharge DX:	Fall (on) (from) other stairs and steps, initial encounter  Instructions for follow-up, activity and diet:	You were admitted for  Secondary Diagnosis:	Impaired ambulation  Secondary Diagnosis:	Type 2 diabetes mellitus  Secondary Diagnosis:	Atherosclerosis of coronary artery  Secondary Diagnosis:	Hyperlipidemia Principal Discharge DX:	Fall (on) (from) other stairs and steps, initial encounter  Goal:	Continue with pain control and follow-up with PMD.  Instructions for follow-up, activity and diet:	You were admitted for L hip and wrist pain after sustaining mechanical fall down 4-5 steps. You had x-ray and CT imaging which did not show fracture or dislocation or the left hip or left wrist. You were managed for pain control. You had severe back pain between the scapula and had x-ray imaging which was also negative for fracture. Your pain was well controlled on IV pain medication. You were seen by pain management to discuss a pain regimen for you to go home with. You are being discharged to follow-up with your PMD in 1-2 weeks. Seek medical attention if you develop worsened back, hip or wrist pain.  Secondary Diagnosis:	Impaired ambulation  Goal:	Continue with pain medications as needed and follow-up with physical therapy.  Instructions for follow-up, activity and diet:	You came to the hospital with severe hip and back pain after a mechanical fall, making it difficult to ambulate. You were started on a pain regimen and were seen by physical therapy who deemed you safe for discharge with ....  Secondary Diagnosis:	Type 2 diabetes mellitus  Goal:	Continue with medications as prescribed.  Instructions for follow-up, activity and diet:	You have diabetes for which you use an insulin pump. You were continued on your daily insulin pump with close fingerstick monitoring. Please continue to use your insulin pump and monitor your blood sugars closely. Follow-up with your PMD for continued diabetes management.  Secondary Diagnosis:	Atherosclerosis of coronary artery  Goal:	Continue with medications as prescribed.  Instructions for follow-up, activity and diet:	Continue with Aspirin, Atorvastatin and Metoprolol as prescribed.  Secondary Diagnosis:	Hyperlipidemia  Goal:	Continue with Atorvastatin as prescribed.  Secondary Diagnosis:	Depression  Goal:	Continue with medications as prescribed.  Secondary Diagnosis:	Hypertension  Goal:	Continue with medications as prescribed and monitor BP closely.  Instructions for follow-up, activity and diet:	You have a history of hypertension and your blood pressure was elevated during your stay. You were seen by cardiology and were started on hydralazine 25 mg twice a day and Imdur 30 mg daily. Please follow-up with your PMD or cardiologist in 1 week to monitor your BP and adjust these medications as needed. You should also have an outpatient allergy work-up for your ACE inhibitor allergy, as this medication would be beneficial for your heart. You should also have an repeat ECHO and Principal Discharge DX:	Fall (on) (from) other stairs and steps, initial encounter  Goal:	Continue with pain control and follow-up with PMD.  Instructions for follow-up, activity and diet:	You were admitted for L hip and wrist pain after sustaining mechanical fall down 4-5 steps. You had x-ray and CT imaging which did not show fracture or dislocation or the left hip or left wrist. You were managed for pain control. You had severe back pain between the scapula and had x-ray imaging which was also negative for fracture. Your pain was well controlled on IV pain medication. You were seen by pain management to discuss a pain regimen for you to go home with. You are being discharged to follow-up with your PMD. Seek medical attention if you develop worsened back, hip or wrist pain.  Secondary Diagnosis:	Impaired ambulation  Goal:	Continue with pain medications as needed and follow-up with physical therapy.  Instructions for follow-up, activity and diet:	You came to the hospital with severe hip and back pain after a mechanical fall, making it difficult to ambulate. You were started on a pain regimen and were seen by physical therapy who deemed you safe for discharge with ....  Secondary Diagnosis:	Type 2 diabetes mellitus  Goal:	Continue with medications as prescribed.  Instructions for follow-up, activity and diet:	You have diabetes for which you use an insulin pump. You were continued on your daily insulin pump with close fingerstick monitoring. Please continue to use your insulin pump and monitor your blood sugars closely. Follow-up with your PMD for continued diabetes management.  Secondary Diagnosis:	Atherosclerosis of coronary artery  Goal:	Continue with medications as prescribed.  Instructions for follow-up, activity and diet:	You have a history of coronary artery disease for which you had a angiography and stent placement. You had some chest pain during your stay, which was likely musculoskeletal in nature. You had en EKG that was unchanged from your previous EKGs (confirmed with your cardiologist). You also had an ECHO which showed severe pump dysfunction with an ejection fraction of 25-30% (normal>50%). Continue to take your Aspirin, Atorvastatin and Metoprolol as prescribed, as well as two new medications, Hydralazine and Imdur. You have an appointment with Dr. Caledra on January 18th at 2pm.  Secondary Diagnosis:	Hyperlipidemia  Goal:	Continue with Atorvastatin as prescribed.  Secondary Diagnosis:	Depression  Goal:	Continue with medications as prescribed.  Secondary Diagnosis:	Hypertension  Goal:	Continue with medications as prescribed and monitor BP closely.  Instructions for follow-up, activity and diet:	You have a history of hypertension and your blood pressure was elevated during your stay. You were seen by cardiology and were started on hydralazine 25 mg twice a day and Imdur 30 mg daily. Please follow-up with your PMD or cardiologist to monitor your BP and adjust these medications as needed. You should also have an outpatient allergy work-up for your ACE inhibitor allergy, as this medication would be beneficial for your heart. Principal Discharge DX:	Intractable pain  Goal:	Continue with pain control and follow-up with PMD.  Instructions for follow-up, activity and diet:	You were admitted for L hip and wrist pain after sustaining mechanical fall down 4-5 steps. You had x-ray and CT imaging which did not show fracture or dislocation or the left hip or left wrist. You were managed for pain control. You had severe back pain between the scapula and had x-ray imaging which was also negative for fracture. Your pain was well controlled on IV pain medication. You were seen by pain management to discuss a pain regimen for you to go home with. You are being discharged to follow-up with your PMD. Seek medical attention if you develop worsened back, hip or wrist pain.  Secondary Diagnosis:	Impaired ambulation  Goal:	Continue with pain medications as needed and follow-up with physical therapy.  Instructions for follow-up, activity and diet:	You came to the hospital with severe hip and back pain after a mechanical fall, making it difficult to ambulate. You were started on a pain regimen and were seen by physical therapy who deemed you safe for discharge with ....  Secondary Diagnosis:	Type 2 diabetes mellitus  Goal:	Continue with medications as prescribed.  Instructions for follow-up, activity and diet:	You have diabetes for which you use an insulin pump. You were continued on your daily insulin pump with close fingerstick monitoring. Please continue to use your insulin pump and monitor your blood sugars closely. Follow-up with your PMD for continued diabetes management.  Secondary Diagnosis:	Atherosclerosis of coronary artery  Goal:	Continue with medications as prescribed.  Instructions for follow-up, activity and diet:	You have a history of coronary artery disease for which you had a angiography and stent placement. You had some chest pain during your stay, which was likely musculoskeletal in nature. You had en EKG that was unchanged from your previous EKGs (confirmed with your cardiologist). You also had an ECHO which showed severe pump dysfunction with an ejection fraction of 25-30% (normal>50%). Continue to take your Aspirin, Atorvastatin and Metoprolol as prescribed, as well as two new medications, Hydralazine and Imdur. You have an appointment with Dr. Caldera on January 18th at 2pm.  Secondary Diagnosis:	Hyperlipidemia  Goal:	Continue with Atorvastatin as prescribed.  Secondary Diagnosis:	Depression  Goal:	Continue with medications as prescribed.  Secondary Diagnosis:	Hypertension  Goal:	Continue with medications as prescribed and monitor BP closely.  Instructions for follow-up, activity and diet:	You have a history of hypertension and your blood pressure was elevated during your stay. You were seen by cardiology and were started on hydralazine 25 mg twice a day and Imdur 30 mg daily. Please follow-up with your PMD or cardiologist to monitor your BP and adjust these medications as needed. You should also have an outpatient allergy work-up for your ACE inhibitor allergy, as this medication would be beneficial for your heart. Principal Discharge DX:	Intractable pain  Goal:	Continue with pain control and follow-up with PMD.  Instructions for follow-up, activity and diet:	You were admitted for L hip and wrist pain after sustaining mechanical fall down 4-5 steps. You had x-ray and CT imaging which did not show fracture or dislocation or the left hip or left wrist. You were managed for pain control. You had severe back pain between the scapula and had x-ray imaging which was also negative for fracture. Your pain was well controlled on IV pain medication. You were seen by pain management to discuss a pain regimen for you to go home with. You are being discharged to follow-up with your PMD. Seek medical attention if you develop worsened back, hip or wrist pain.  Secondary Diagnosis:	Impaired ambulation  Goal:	Continue with pain medications as needed.  Instructions for follow-up, activity and diet:	You came to the hospital with severe hip and back pain after a mechanical fall, making it difficult to ambulate. You were started on a pain regimen and were seen by physical therapy to help with ambulation. Resume normal activities as tolerated upon discharge and continue with pain medications as needed.  Secondary Diagnosis:	Type 2 diabetes mellitus  Goal:	Continue with medications as prescribed.  Instructions for follow-up, activity and diet:	You have diabetes for which you use an insulin pump. You were continued on your daily insulin pump with close fingerstick monitoring. Please continue to use your insulin pump and monitor your blood sugars closely. Follow-up with your PMD for continued diabetes management.  Secondary Diagnosis:	Atherosclerosis of coronary artery  Goal:	Continue with medications as prescribed.  Instructions for follow-up, activity and diet:	You have a history of coronary artery disease for which you had a angiography and stent placement. You had some chest pain during your stay, which was likely musculoskeletal in nature. You had en EKG that was unchanged from your previous EKGs (confirmed with your cardiologist). You also had an ECHO which showed severe pump dysfunction with an ejection fraction of 25-30% (normal>50%). Continue to take your Aspirin, Atorvastatin and Metoprolol as prescribed. You have an appointment with Dr. Caldera on January 18th at 2pm.  Secondary Diagnosis:	Hyperlipidemia  Goal:	Continue with Atorvastatin as prescribed.  Secondary Diagnosis:	Depression  Goal:	Continue with medications as prescribed.  Secondary Diagnosis:	Hypertension  Goal:	Continue with medications as prescribed and monitor BP closely.  Instructions for follow-up, activity and diet:	You have a history of hypertension and your blood pressure was relatively controlled during your stay. You were seen by cardiology who would like to start you on hydralazine and Imdur in the future. Please follow-up with your Dr. Caldera to monitor your BP and start these medications as needed. You should also have an outpatient allergy work-up for your ACE inhibitor allergy, as this medication would be beneficial for your heart. Principal Discharge DX:	Intractable pain  Goal:	Continue with pain control and follow-up with PMD.  Instructions for follow-up, activity and diet:	You were admitted for L hip and wrist pain after sustaining mechanical fall down 4-5 steps. You had x-ray and CT imaging which did not show fracture or dislocation or the left hip or left wrist. You were managed for pain control. You had severe back pain between the scapula and had x-ray imaging which was also negative for fracture. Your pain was well controlled on IV pain medication. You were seen by pain management to discuss a pain regimen for you to go home with. They will call in the scripts to your pharmacy for your pain medicine. Please take these medications as prescribed. You are being discharged to follow-up with your PMD. Seek medical attention if you develop worsened back, hip or wrist pain.  Secondary Diagnosis:	Impaired ambulation  Goal:	Continue with pain medications as needed.  Instructions for follow-up, activity and diet:	You came to the hospital with severe hip and back pain after a mechanical fall, making it difficult to ambulate. You were started on a pain regimen and were seen by physical therapy to help with ambulation. Resume normal activities as tolerated upon discharge and continue with pain medications as needed. Pain medication will be sent to your pharmacy by pain management specialists in the next 12-24hrs.  Secondary Diagnosis:	Type 2 diabetes mellitus  Goal:	Continue with medications as prescribed.  Instructions for follow-up, activity and diet:	You have diabetes for which you use an insulin pump. You were continued on your daily insulin pump with close fingerstick monitoring. Please continue to use your insulin pump and monitor your blood sugars closely. Follow-up with your PMD for continued diabetes management.  Secondary Diagnosis:	Atherosclerosis of coronary artery  Goal:	Continue with medications as prescribed.  Instructions for follow-up, activity and diet:	You have a history of coronary artery disease for which you had a angiography and stent placement. You had some chest pain during your stay, which was likely musculoskeletal in nature. You had en EKG that was unchanged from your previous EKGs (confirmed with your cardiologist). You also had an ECHO which showed severe pump dysfunction with an ejection fraction of 25-30% (normal>50%). Continue to take your Aspirin, Atorvastatin and Metoprolol as prescribed. You have an appointment with Dr. Caldera on January 18th at 2pm.  Secondary Diagnosis:	Hyperlipidemia  Goal:	Continue with Atorvastatin as prescribed.  Secondary Diagnosis:	Depression  Goal:	Continue with medications as prescribed.  Secondary Diagnosis:	Hypertension  Goal:	Continue with medications as prescribed and monitor BP closely.  Instructions for follow-up, activity and diet:	You have a history of hypertension and your blood pressure was relatively controlled during your stay. You were seen by cardiology who would like to start you on hydralazine and Imdur in the future. Please follow-up with your Dr. Caldera to monitor your BP and start these medications as needed. You should also have an outpatient allergy work-up for your ACE inhibitor allergy, as this medication would be beneficial for your heart.

## 2017-11-20 NOTE — CONSULT NOTE ADULT - SUBJECTIVE AND OBJECTIVE BOX
Pain Management Consult Note - [facility] [phone]    Chief Complaint: Back/L hip pain     HPI:  This is a 61 y/o M with h/o CAD s/p PCI (2012), DM2 with peripheral neuropathy, s/p B/L toe amputations, Hyperlipidemia, and Depression presented ER for left hip and back pain s/p fall.  Subsequently, patient has noticed some swelling around his left wrist, pain in left hip and back and is now having difficulty with weight bearing. Patient took Flexeril, percocet but had no relief. Xray showed no evidence of acute fx but patient was consulted due to severe pain with current regimen. He was on Percocet 5/325 q4h with increased dilaudid 2mg IV q4h over the weekend. Pt states that he is not on any narcotics at home even though he has knee/back pain awaiting for a clearance for his joint replacement.  Pt drinks socially once a month and denies smoking. At the mean time, pt c/o chest pain and consulted to cardiology for further evaluation.       Pain is ___ sharp ____dull ___burning __x_achy ___ Intensity: ____ mild ___mod _x__severe     Location ____surgical site ____cervical _____lumbar ____abd ____upper ext____lower ext    Worse with __x__activity __x__movement _____physical therapy___ Rest    Improved with __x__medication ___x_rest ____physical therapy    ROS: Const:  _-__febrile   Eyes:___ENT:___CV: ___chest pain  Resp: ____sob  GI:__-_nausea _-__vomiting __-_abd pain ___npo ___clears __full diet __bm  :___ Musk: __+_pain ___spasm  Skin:___ Neuro:  _-__sgbpypno__-_aifajjgkc__-_ numbness _+__weakness ___paresth  Psych:__anxiety  Endo:___ Heme:___Allergy:_________, ___all others reviewed and negative    PAST MEDICAL & SURGICAL HISTORY:  Type 2 diabetes mellitus with neurological manifestations: Diabetic neuropathy with neurologic complication  Type 2 diabetes mellitus: DM (diabetes mellitus), type 2  History of surgery to heart and great vessels, presenting hazards to health: x 4 in 2005  Atherosclerosis of coronary artery: CAD (coronary artery disease)  Status post percutaneous transluminal coronary angioplasty: in 2012  History of surgery to major organs, presenting hazards to health: x 2  Other postprocedural status: Fixation hardware in foot      SH: -___Tobacco   __+_Alcohol                          FH:FAMILY HISTORY:      aspirin enteric coated 81 milliGRAM(s) Oral daily  atorvastatin 20 milliGRAM(s) Oral at bedtime  cyclobenzaprine 5 milliGRAM(s) Oral three times a day PRN  dextrose 5%. 1000 milliLiter(s) IV Continuous <Continuous>  dextrose 50% Injectable 12.5 Gram(s) IV Push once  dextrose 50% Injectable 25 Gram(s) IV Push once  dextrose 50% Injectable 25 Gram(s) IV Push once  dextrose Gel 1 Dose(s) Oral once PRN  docusate sodium 100 milliGRAM(s) Oral three times a day  DULoxetine 90 milliGRAM(s) Oral daily  glucagon  Injectable 1 milliGRAM(s) IntraMuscular once PRN  heparin  Injectable 7500 Unit(s) SubCutaneous every 8 hours  hydrALAZINE 25 milliGRAM(s) Oral every 12 hours  HYDROmorphone  Injectable 1 milliGRAM(s) IV Push every 2 hours PRN  insulin lispro (HumaLOG) corrective regimen sliding scale   SubCutaneous Before meals and at bedtime  isosorbide   mononitrate ER Tablet (IMDUR) 30 milliGRAM(s) Oral daily  lidocaine   Patch 1 Patch Transdermal daily  metoprolol succinate ER 50 milliGRAM(s) Oral daily  oxyCODONE    5 mG/acetaminophen 325 mG 2 Tablet(s) Oral every 4 hours PRN  oxyCODONE    5 mG/acetaminophen 325 mG 1 Tablet(s) Oral every 4 hours PRN  PARoxetine 20 milliGRAM(s) Oral daily  polyethylene glycol 3350 17 Gram(s) Oral daily  senna 2 Tablet(s) Oral at bedtime      T(C): 36.9 (11-20-17 @ 15:40), Max: 36.9 (11-19-17 @ 21:14)  HR: 77 (11-20-17 @ 15:40) (68 - 89)  BP: 125/64 (11-20-17 @ 15:40) (119/70 - 161/84)  RR: 17 (11-20-17 @ 15:40) (16 - 18)  SpO2: 92% (11-20-17 @ 15:40) (92% - 95%)  Wt(kg): --    T(C): 36.9 (11-20-17 @ 15:40), Max: 36.9 (11-19-17 @ 21:14)  HR: 77 (11-20-17 @ 15:40) (68 - 89)  BP: 125/64 (11-20-17 @ 15:40) (119/70 - 161/84)  RR: 17 (11-20-17 @ 15:40) (16 - 18)  SpO2: 92% (11-20-17 @ 15:40) (92% - 95%)  Wt(kg): --    T(C): 36.9 (11-20-17 @ 15:40), Max: 36.9 (11-19-17 @ 21:14)  HR: 77 (11-20-17 @ 15:40) (68 - 89)  BP: 125/64 (11-20-17 @ 15:40) (119/70 - 161/84)  RR: 17 (11-20-17 @ 15:40) (16 - 18)  SpO2: 92% (11-20-17 @ 15:40) (92% - 95%)  Wt(kg): --    PHYSICAL EXAM:  Gen Appearance: __x_no acute distress __x_appropriate        Neuro: __x_SILT feet____ EOM Intact Psych: AAOX__, ___mood/affect appropriate        Eyes: _x__conjunctiva WNL  _____ Pupils equal and round        ENT: _x__ears and nose atraumatic___ Hearing grossly intact        Neck: _x__trachea midline, no visible masses ___thyroid without palpable mass    Resp: __x_Nml WOB____No tactile fremitus ___clear to auscultation    Cardio: _x__extremities free from edema ____pedal pulses palpable    GI/Abdomen: __x_soft _____ Nontender______Nondistended_____HSM    Lymphatic: _x__no palpable nodes in neck  ___no palpable nodes calves and feet    Skin/Wound: ___Incision, ___Dressing c/d/i,   ____surrounding tissues soft,  ___drain/chest tube present____    Muscular: EHL _5__/5  Gastrocnemius5___/5    __x_absent clubbing/cyanosis      ASSESSMENT: This is a 62y old Male with a history of   IMPAIRED AMBULATION; CONTUSION OF HIP AND THIGH  No h/o HF  Unknown h/o HF  Handoff  MEWS Score  Type 2 diabetes mellitus with neurological manifestations  Type 2 diabetes mellitus  History of surgery to heart and great vessels, presenting hazards to health  Atherosclerosis of coronary artery  Status post percutaneous transluminal coronary angioplasty  Intractable pain  Fall (on) (from) other stairs and steps, initial encounter  Impaired ambulation  Hyperglycemia  Prophylactic measure  Nutrition, metabolism, and development symptoms  Hyponatremia  Anemia  Type 2 diabetes mellitus with neurological manifestations  Atherosclerosis of coronary artery  Impaired ambulation  History of surgery to major organs, presenting hazards to health  Other postprocedural status  FALL  Hypertension  Depression  Hyperlipidemia  Atherosclerosis of coronary artery  Type 2 diabetes mellitus  Impaired ambulation  Fall (on) (from) other stairs and steps, initial encounter  Contusion of hip and thigh, left, initial encounter  left hip and back pain s/p fall with neuropathic pain which are not controlled by current regimen.     Recommended Treatment PLAN:  1. Suggest Dilaudid 1mg IV q2h prn for breakthrough  2. Suggest increase Percocet 1-2tabs for moderate/severe pain q4h prn  3. Consider staring Gabapentin 300mg po q8h for neuropathic pain Pain Management Consult Note - [facility] [phone]    Chief Complaint: Back/L hip pain     HPI:  This is a 63 y/o M with h/o CAD s/p PCI (2012), DM2 with peripheral neuropathy, s/p B/L toe amputations, Hyperlipidemia, and Depression presented ER for left hip and back pain s/p fall.  Subsequently, patient has noticed some swelling around his left wrist, pain in left hip and back and is now having difficulty with weight bearing. Patient took Flexeril, percocet but had no relief. Xray showed no evidence of acute fx but patient was consulted due to severe pain with current regimen. He was on Percocet 5/325 q4h with increased dilaudid 2mg IV q4h over the weekend. Pt states that he is not on any narcotics at home even though he has knee/back pain awaiting for a clearance for his joint replacement.  Pt drinks socially once a month and denies smoking. At the mean time, pt c/o chest pain and consulted to cardiology for further evaluation.       Pain is ___ sharp ____dull ___burning __x_achy ___ Intensity: ____ mild ___mod _x__severe     Location ____surgical site ____cervical _____lumbar ____abd ____upper ext____lower ext    Worse with __x__activity __x__movement _____physical therapy___ Rest    Improved with __x__medication ___x_rest ____physical therapy    ROS: Const:  _-__febrile   Eyes:___ENT:___CV: ___chest pain  Resp: ____sob  GI:__-_nausea _-__vomiting __-_abd pain ___npo ___clears __full diet __bm  :___ Musk: __+_pain ___spasm  Skin:___ Neuro:  _-__nyypphsy__-_walsnuxds__-_ numbness _+__weakness ___paresth  Psych:__anxiety  Endo:___ Heme:___Allergy:_________, ___all others reviewed and negative    PAST MEDICAL & SURGICAL HISTORY:  Type 2 diabetes mellitus with neurological manifestations: Diabetic neuropathy with neurologic complication  Type 2 diabetes mellitus: DM (diabetes mellitus), type 2  History of surgery to heart and great vessels, presenting hazards to health: x 4 in 2005  Atherosclerosis of coronary artery: CAD (coronary artery disease)  Status post percutaneous transluminal coronary angioplasty: in 2012  History of surgery to major organs, presenting hazards to health: x 2  Other postprocedural status: Fixation hardware in foot      SH: -___Tobacco   __+_Alcohol                          FH:FAMILY HISTORY:      aspirin enteric coated 81 milliGRAM(s) Oral daily  atorvastatin 20 milliGRAM(s) Oral at bedtime  cyclobenzaprine 5 milliGRAM(s) Oral three times a day PRN  dextrose 5%. 1000 milliLiter(s) IV Continuous <Continuous>  dextrose 50% Injectable 12.5 Gram(s) IV Push once  dextrose 50% Injectable 25 Gram(s) IV Push once  dextrose 50% Injectable 25 Gram(s) IV Push once  dextrose Gel 1 Dose(s) Oral once PRN  docusate sodium 100 milliGRAM(s) Oral three times a day  DULoxetine 90 milliGRAM(s) Oral daily  glucagon  Injectable 1 milliGRAM(s) IntraMuscular once PRN  heparin  Injectable 7500 Unit(s) SubCutaneous every 8 hours  hydrALAZINE 25 milliGRAM(s) Oral every 12 hours  HYDROmorphone  Injectable 1 milliGRAM(s) IV Push every 2 hours PRN  insulin lispro (HumaLOG) corrective regimen sliding scale   SubCutaneous Before meals and at bedtime  isosorbide   mononitrate ER Tablet (IMDUR) 30 milliGRAM(s) Oral daily  lidocaine   Patch 1 Patch Transdermal daily  metoprolol succinate ER 50 milliGRAM(s) Oral daily  oxyCODONE    5 mG/acetaminophen 325 mG 2 Tablet(s) Oral every 4 hours PRN  oxyCODONE    5 mG/acetaminophen 325 mG 1 Tablet(s) Oral every 4 hours PRN  PARoxetine 20 milliGRAM(s) Oral daily  polyethylene glycol 3350 17 Gram(s) Oral daily  senna 2 Tablet(s) Oral at bedtime      T(C): 36.9 (11-20-17 @ 15:40), Max: 36.9 (11-19-17 @ 21:14)  HR: 77 (11-20-17 @ 15:40) (68 - 89)  BP: 125/64 (11-20-17 @ 15:40) (119/70 - 161/84)  RR: 17 (11-20-17 @ 15:40) (16 - 18)  SpO2: 92% (11-20-17 @ 15:40) (92% - 95%)  Wt(kg): --    T(C): 36.9 (11-20-17 @ 15:40), Max: 36.9 (11-19-17 @ 21:14)  HR: 77 (11-20-17 @ 15:40) (68 - 89)  BP: 125/64 (11-20-17 @ 15:40) (119/70 - 161/84)  RR: 17 (11-20-17 @ 15:40) (16 - 18)  SpO2: 92% (11-20-17 @ 15:40) (92% - 95%)  Wt(kg): --    T(C): 36.9 (11-20-17 @ 15:40), Max: 36.9 (11-19-17 @ 21:14)  HR: 77 (11-20-17 @ 15:40) (68 - 89)  BP: 125/64 (11-20-17 @ 15:40) (119/70 - 161/84)  RR: 17 (11-20-17 @ 15:40) (16 - 18)  SpO2: 92% (11-20-17 @ 15:40) (92% - 95%)  Wt(kg): --    PHYSICAL EXAM:  Gen Appearance: __x_no acute distress __x_appropriate        Neuro: __x_SILT feet____ EOM Intact Psych: AAOX__, ___mood/affect appropriate        Eyes: _x__conjunctiva WNL  _____ Pupils equal and round        ENT: _x__ears and nose atraumatic___ Hearing grossly intact        Neck: _x__trachea midline, no visible masses ___thyroid without palpable mass    Resp: __x_Nml WOB____No tactile fremitus ___clear to auscultation    Cardio: _x__extremities free from edema ____pedal pulses palpable    GI/Abdomen: __x_soft _____ Nontender______Nondistended_____HSM    Lymphatic: _x__no palpable nodes in neck  ___no palpable nodes calves and feet    Skin/Wound: ___Incision, ___Dressing c/d/i,   ____surrounding tissues soft,  ___drain/chest tube present____    Muscular: EHL _5__/5  Gastrocnemius5___/5    __x_absent clubbing/cyanosis      ASSESSMENT: This is a 62y old Male with a history of   IMPAIRED AMBULATION; CONTUSION OF HIP AND THIGH  No h/o HF  Unknown h/o HF  Handoff  MEWS Score  Type 2 diabetes mellitus with neurological manifestations  Type 2 diabetes mellitus  History of surgery to heart and great vessels, presenting hazards to health  Atherosclerosis of coronary artery  Status post percutaneous transluminal coronary angioplasty  Intractable pain  Fall (on) (from) other stairs and steps, initial encounter  Impaired ambulation  Hyperglycemia  Prophylactic measure  Nutrition, metabolism, and development symptoms  Hyponatremia  Anemia  Type 2 diabetes mellitus with neurological manifestations  Atherosclerosis of coronary artery  Impaired ambulation  History of surgery to major organs, presenting hazards to health  Other postprocedural status  FALL  Hypertension  Depression  Hyperlipidemia  Atherosclerosis of coronary artery  Type 2 diabetes mellitus  Impaired ambulation  Fall (on) (from) other stairs and steps, initial encounter  Contusion of hip and thigh, left, initial encounter  left hip and back pain s/p fall with neuropathic pain which are not controlled by current regimen.     Recommended Treatment PLAN:  1. Suggest Dilaudid 1mg IV q2h prn for breakthrough  2. Suggest increase Percocet 1-2tabs for moderate/severe pain q4h prn  3. Consider staring Gabapentin 300mg po q8h for neuropathic pain  4. Continue Flexeril and Lidoderm patch

## 2017-11-20 NOTE — DISCHARGE NOTE ADULT - PLAN OF CARE
You were admitted for Continue with pain control and follow-up with PMD. You were admitted for L hip and wrist pain after sustaining mechanical fall down 4-5 steps. You had x-ray and CT imaging which did not show fracture or dislocation or the left hip or left wrist. You were managed for pain control. You had severe back pain between the scapula and had x-ray imaging which was also negative for fracture. Your pain was well controlled on IV pain medication. You were seen by pain management to discuss a pain regimen for you to go home with. You are being discharged to follow-up with your PMD in 1-2 weeks. Seek medical attention if you develop worsened back, hip or wrist pain. Continue with pain medications as needed and follow-up with physical therapy. You came to the hospital with severe hip and back pain after a mechanical fall, making it difficult to ambulate. You were started on a pain regimen and were seen by physical therapy who deemed you safe for discharge with .... Continue with medications as prescribed. You have diabetes for which you use an insulin pump. You were continued on your daily insulin pump with close fingerstick monitoring. Please continue to use your insulin pump and monitor your blood sugars closely. Follow-up with your PMD for continued diabetes management. Continue with Aspirin, Atorvastatin and Metoprolol as prescribed. Continue with Atorvastatin as prescribed. Continue with medications as prescribed and monitor BP closely. You have a history of hypertension and your blood pressure was elevated during your stay. You were seen by cardiology and were started on hydralazine 25 mg twice a day and Imdur 30 mg daily. Please follow-up with your PMD or cardiologist in 1 week to monitor your BP and adjust these medications as needed. You should also have an outpatient allergy work-up for your ACE inhibitor allergy, as this medication would be beneficial for your heart. You should also have an repeat ECHO and You were admitted for L hip and wrist pain after sustaining mechanical fall down 4-5 steps. You had x-ray and CT imaging which did not show fracture or dislocation or the left hip or left wrist. You were managed for pain control. You had severe back pain between the scapula and had x-ray imaging which was also negative for fracture. Your pain was well controlled on IV pain medication. You were seen by pain management to discuss a pain regimen for you to go home with. You are being discharged to follow-up with your PMD. Seek medical attention if you develop worsened back, hip or wrist pain. You have a history of coronary artery disease for which you had a angiography and stent placement. You had some chest pain during your stay, which was likely musculoskeletal in nature. You had en EKG that was unchanged from your previous EKGs (confirmed with your cardiologist). You also had an ECHO which showed severe pump dysfunction with an ejection fraction of 25-30% (normal>50%). Continue to take your Aspirin, Atorvastatin and Metoprolol as prescribed, as well as two new medications, Hydralazine and Imdur. You have an appointment with Dr. Caldera on January 18th at 2pm. You have a history of hypertension and your blood pressure was elevated during your stay. You were seen by cardiology and were started on hydralazine 25 mg twice a day and Imdur 30 mg daily. Please follow-up with your PMD or cardiologist to monitor your BP and adjust these medications as needed. You should also have an outpatient allergy work-up for your ACE inhibitor allergy, as this medication would be beneficial for your heart. Continue with pain medications as needed. You came to the hospital with severe hip and back pain after a mechanical fall, making it difficult to ambulate. You were started on a pain regimen and were seen by physical therapy to help with ambulation. Resume normal activities as tolerated upon discharge and continue with pain medications as needed. You have a history of coronary artery disease for which you had a angiography and stent placement. You had some chest pain during your stay, which was likely musculoskeletal in nature. You had en EKG that was unchanged from your previous EKGs (confirmed with your cardiologist). You also had an ECHO which showed severe pump dysfunction with an ejection fraction of 25-30% (normal>50%). Continue to take your Aspirin, Atorvastatin and Metoprolol as prescribed. You have an appointment with Dr. Caldera on January 18th at 2pm. You have a history of hypertension and your blood pressure was relatively controlled during your stay. You were seen by cardiology who would like to start you on hydralazine and Imdur in the future. Please follow-up with your Dr. Caldera to monitor your BP and start these medications as needed. You should also have an outpatient allergy work-up for your ACE inhibitor allergy, as this medication would be beneficial for your heart. You were admitted for L hip and wrist pain after sustaining mechanical fall down 4-5 steps. You had x-ray and CT imaging which did not show fracture or dislocation or the left hip or left wrist. You were managed for pain control. You had severe back pain between the scapula and had x-ray imaging which was also negative for fracture. Your pain was well controlled on IV pain medication. You were seen by pain management to discuss a pain regimen for you to go home with. They will call in the scripts to your pharmacy for your pain medicine. Please take these medications as prescribed. You are being discharged to follow-up with your PMD. Seek medical attention if you develop worsened back, hip or wrist pain. You came to the hospital with severe hip and back pain after a mechanical fall, making it difficult to ambulate. You were started on a pain regimen and were seen by physical therapy to help with ambulation. Resume normal activities as tolerated upon discharge and continue with pain medications as needed. Pain medication will be sent to your pharmacy by pain management specialists in the next 12-24hrs.

## 2017-11-22 DIAGNOSIS — Z79.4 LONG TERM (CURRENT) USE OF INSULIN: ICD-10-CM

## 2017-11-22 DIAGNOSIS — E78.5 HYPERLIPIDEMIA, UNSPECIFIED: ICD-10-CM

## 2017-11-22 DIAGNOSIS — E11.40 TYPE 2 DIABETES MELLITUS WITH DIABETIC NEUROPATHY, UNSPECIFIED: ICD-10-CM

## 2017-11-22 DIAGNOSIS — I11.0 HYPERTENSIVE HEART DISEASE WITH HEART FAILURE: ICD-10-CM
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
## 2017-11-22 DIAGNOSIS — D64.9 ANEMIA, UNSPECIFIED: ICD-10-CM

## 2017-11-22 DIAGNOSIS — S70.12XA CONTUSION OF LEFT THIGH, INITIAL ENCOUNTER: ICD-10-CM

## 2017-11-22 DIAGNOSIS — R26.2 DIFFICULTY IN WALKING, NOT ELSEWHERE CLASSIFIED: ICD-10-CM

## 2017-11-22 DIAGNOSIS — E11.65 TYPE 2 DIABETES MELLITUS WITH HYPERGLYCEMIA: ICD-10-CM

## 2017-11-22 DIAGNOSIS — M62.838 OTHER MUSCLE SPASM: ICD-10-CM

## 2017-11-22 DIAGNOSIS — Z79.82 LONG TERM (CURRENT) USE OF ASPIRIN: ICD-10-CM
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
## 2017-11-22 DIAGNOSIS — S70.02XA CONTUSION OF LEFT HIP, INITIAL ENCOUNTER: ICD-10-CM

## 2017-11-22 DIAGNOSIS — I50.20 UNSPECIFIED SYSTOLIC (CONGESTIVE) HEART FAILURE: ICD-10-CM

## 2017-11-22 DIAGNOSIS — Z96.41 PRESENCE OF INSULIN PUMP (EXTERNAL) (INTERNAL): ICD-10-CM

## 2017-11-22 DIAGNOSIS — I25.2 OLD MYOCARDIAL INFARCTION: ICD-10-CM

## 2017-11-22 DIAGNOSIS — Z88.8 ALLERGY STATUS TO OTHER DRUGS, MEDICAMENTS AND BIOLOGICAL SUBSTANCES STATUS: ICD-10-CM

## 2017-11-22 DIAGNOSIS — F32.9 MAJOR DEPRESSIVE DISORDER, SINGLE EPISODE, UNSPECIFIED: ICD-10-CM

## 2017-11-22 DIAGNOSIS — R07.89 OTHER CHEST PAIN: ICD-10-CM

## 2017-11-22 DIAGNOSIS — Z87.891 PERSONAL HISTORY OF NICOTINE DEPENDENCE: ICD-10-CM

## 2017-11-22 DIAGNOSIS — I25.10 ATHEROSCLEROTIC HEART DISEASE OF NATIVE CORONARY ARTERY WITHOUT ANGINA PECTORIS: ICD-10-CM

## 2017-11-22 DIAGNOSIS — E87.1 HYPO-OSMOLALITY AND HYPONATREMIA: ICD-10-CM

## 2017-11-22 DIAGNOSIS — M79.2 NEURALGIA AND NEURITIS, UNSPECIFIED: ICD-10-CM

## 2017-11-22 DIAGNOSIS — I42.9 CARDIOMYOPATHY, UNSPECIFIED: ICD-10-CM

## 2017-11-22 DIAGNOSIS — Z95.5 PRESENCE OF CORONARY ANGIOPLASTY IMPLANT AND GRAFT: ICD-10-CM

## 2017-12-05 ENCOUNTER — OUTPATIENT (OUTPATIENT)
Dept: OUTPATIENT SERVICES | Facility: HOSPITAL | Age: 62
LOS: 1 days | Discharge: HOME | End: 2017-12-05

## 2017-12-05 DIAGNOSIS — R07.89 OTHER CHEST PAIN: ICD-10-CM

## 2017-12-05 DIAGNOSIS — J18.9 PNEUMONIA, UNSPECIFIED ORGANISM: ICD-10-CM

## 2017-12-05 DIAGNOSIS — E11.621 TYPE 2 DIABETES MELLITUS WITH FOOT ULCER: ICD-10-CM

## 2017-12-05 DIAGNOSIS — I24.9 ACUTE ISCHEMIC HEART DISEASE, UNSPECIFIED: ICD-10-CM

## 2018-01-18 ENCOUNTER — LABORATORY RESULT (OUTPATIENT)
Age: 63
End: 2018-01-18

## 2018-01-18 ENCOUNTER — APPOINTMENT (OUTPATIENT)
Dept: HEART AND VASCULAR | Facility: CLINIC | Age: 63
End: 2018-01-18
Payer: COMMERCIAL

## 2018-01-18 VITALS
HEART RATE: 62 BPM | HEIGHT: 72 IN | BODY MASS INDEX: 35.62 KG/M2 | DIASTOLIC BLOOD PRESSURE: 70 MMHG | SYSTOLIC BLOOD PRESSURE: 120 MMHG | WEIGHT: 263 LBS
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
## 2018-01-18 PROCEDURE — 36415 COLL VENOUS BLD VENIPUNCTURE: CPT

## 2018-01-18 PROCEDURE — 93000 ELECTROCARDIOGRAM COMPLETE: CPT

## 2018-01-18 PROCEDURE — 99214 OFFICE O/P EST MOD 30 MIN: CPT | Mod: 25
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
## 2018-01-19 LAB
ALBUMIN SERPL ELPH-MCNC: 4 G/DL
ALP BLD-CCNC: 110 U/L
ALT SERPL-CCNC: 26 U/L
ANION GAP SERPL CALC-SCNC: 16 MMOL/L
AST SERPL-CCNC: 23 U/L
BASOPHILS # BLD AUTO: 0.05 K/UL
BASOPHILS NFR BLD AUTO: 0.6 %
BILIRUB SERPL-MCNC: 0.7 MG/DL
BUN SERPL-MCNC: 26 MG/DL
CALCIUM SERPL-MCNC: 9.6 MG/DL
CHLORIDE SERPL-SCNC: 101 MMOL/L
CHOLEST SERPL-MCNC: 159 MG/DL
CHOLEST/HDLC SERPL: 3.7 RATIO
CO2 SERPL-SCNC: 26 MMOL/L
CREAT SERPL-MCNC: 1.1 MG/DL
EOSINOPHIL # BLD AUTO: 0.26 K/UL
EOSINOPHIL NFR BLD AUTO: 3.2 %
GLUCOSE SERPL-MCNC: 211 MG/DL
HBA1C MFR BLD HPLC: 8.9 %
HCT VFR BLD CALC: 38.8 %
HDLC SERPL-MCNC: 43 MG/DL
HGB BLD-MCNC: 11.9 G/DL
IMM GRANULOCYTES NFR BLD AUTO: 0.2 %
LDLC SERPL CALC-MCNC: 98 MG/DL
LYMPHOCYTES # BLD AUTO: 1.46 K/UL
LYMPHOCYTES NFR BLD AUTO: 17.8 %
MAN DIFF?: NORMAL
MCHC RBC-ENTMCNC: 27.4 PG
MCHC RBC-ENTMCNC: 30.7 GM/DL
MCV RBC AUTO: 89.2 FL
MONOCYTES # BLD AUTO: 0.96 K/UL
MONOCYTES NFR BLD AUTO: 11.7 %
NEUTROPHILS # BLD AUTO: 5.44 K/UL
NEUTROPHILS NFR BLD AUTO: 66.5 %
PLATELET # BLD AUTO: 318 K/UL
POTASSIUM SERPL-SCNC: 5.8 MMOL/L
PROT SERPL-MCNC: 6.8 G/DL
RBC # BLD: 4.35 M/UL
RBC # FLD: 15.6 %
SODIUM SERPL-SCNC: 143 MMOL/L
TRIGL SERPL-MCNC: 91 MG/DL
TSH SERPL-ACNC: 4.6 UIU/ML
WBC # FLD AUTO: 8.19 K/UL

## 2018-01-25 ENCOUNTER — APPOINTMENT (OUTPATIENT)
Dept: ENDOCRINOLOGY | Facility: CLINIC | Age: 63
End: 2018-01-25

## 2018-01-26 NOTE — ED ADULT NURSE NOTE - TEMPLATE LIST FOR HEAD TO TOE ASSESSMENT
The transport originated from MICU. Pt. was transported to Cath lab. Assisting with the transport was 4 RN's and 1 RRT. Appropriate devices were applied to monitor the patient's condition during transport. Patient transported  via 100% O2 via ventilator. Patient tolerated well.         Cinthia Taylor  9:47 AM Fall

## 2018-03-24 ENCOUNTER — LABORATORY RESULT (OUTPATIENT)
Age: 63
End: 2018-03-24

## 2018-03-25 ENCOUNTER — RX RENEWAL (OUTPATIENT)
Age: 63
End: 2018-03-25

## 2018-03-25 LAB — HBA1C MFR BLD HPLC: 8.9 %

## 2018-04-12 ENCOUNTER — LABORATORY RESULT (OUTPATIENT)
Age: 63
End: 2018-04-12

## 2018-04-12 ENCOUNTER — APPOINTMENT (OUTPATIENT)
Dept: HEART AND VASCULAR | Facility: CLINIC | Age: 63
End: 2018-04-12
Payer: COMMERCIAL

## 2018-04-12 VITALS
HEART RATE: 81 BPM | HEIGHT: 72 IN | WEIGHT: 260 LBS | BODY MASS INDEX: 35.21 KG/M2 | DIASTOLIC BLOOD PRESSURE: 70 MMHG | SYSTOLIC BLOOD PRESSURE: 110 MMHG

## 2018-04-12 PROCEDURE — 93000 ELECTROCARDIOGRAM COMPLETE: CPT

## 2018-04-12 PROCEDURE — 99396 PREV VISIT EST AGE 40-64: CPT | Mod: 25

## 2018-04-12 PROCEDURE — 36415 COLL VENOUS BLD VENIPUNCTURE: CPT

## 2018-04-12 RX ORDER — OXYCODONE AND ACETAMINOPHEN 5; 325 MG/1; MG/1
5-325 TABLET ORAL
Qty: 24 | Refills: 0 | Status: DISCONTINUED | COMMUNITY
Start: 2017-11-21

## 2018-04-12 RX ORDER — COLCHICINE 0.6 MG/1
0.6 TABLET, FILM COATED ORAL
Qty: 30 | Refills: 1 | Status: DISCONTINUED | COMMUNITY
Start: 2017-10-20 | End: 2018-04-12

## 2018-04-13 RX ORDER — GABAPENTIN 300 MG/1
300 CAPSULE ORAL 3 TIMES DAILY
Qty: 270 | Refills: 0 | Status: DISCONTINUED | COMMUNITY
Start: 2018-01-16 | End: 2018-04-13

## 2018-05-02 ENCOUNTER — FORM ENCOUNTER (OUTPATIENT)
Age: 63
End: 2018-05-02

## 2018-05-03 ENCOUNTER — OUTPATIENT (OUTPATIENT)
Dept: OUTPATIENT SERVICES | Facility: HOSPITAL | Age: 63
LOS: 1 days | End: 2018-05-03
Payer: COMMERCIAL

## 2018-05-03 ENCOUNTER — APPOINTMENT (OUTPATIENT)
Dept: ORTHOPEDIC SURGERY | Facility: CLINIC | Age: 63
End: 2018-05-03
Payer: COMMERCIAL

## 2018-05-03 VITALS — BODY MASS INDEX: 34.46 KG/M2 | HEIGHT: 73 IN | WEIGHT: 260 LBS

## 2018-05-03 DIAGNOSIS — M17.0 BILATERAL PRIMARY OSTEOARTHRITIS OF KNEE: ICD-10-CM

## 2018-05-03 DIAGNOSIS — Z72.3 LACK OF PHYSICAL EXERCISE: ICD-10-CM

## 2018-05-03 DIAGNOSIS — M17.11 UNILATERAL PRIMARY OSTEOARTHRITIS, RIGHT KNEE: ICD-10-CM

## 2018-05-03 DIAGNOSIS — Z78.9 OTHER SPECIFIED HEALTH STATUS: ICD-10-CM

## 2018-05-03 LAB
ANION GAP SERPL CALC-SCNC: 9 MMOL/L — SIGNIFICANT CHANGE UP (ref 5–17)
APPEARANCE UR: CLEAR — SIGNIFICANT CHANGE UP
APTT BLD: 28.1 SEC — SIGNIFICANT CHANGE UP (ref 27.5–37.4)
BILIRUB UR-MCNC: NEGATIVE — SIGNIFICANT CHANGE UP
BUN SERPL-MCNC: 23 MG/DL — SIGNIFICANT CHANGE UP (ref 7–23)
CALCIUM SERPL-MCNC: 9.3 MG/DL — SIGNIFICANT CHANGE UP (ref 8.4–10.5)
CHLORIDE SERPL-SCNC: 99 MMOL/L — SIGNIFICANT CHANGE UP (ref 96–108)
CO2 SERPL-SCNC: 30 MMOL/L — SIGNIFICANT CHANGE UP (ref 22–31)
COLOR SPEC: YELLOW — SIGNIFICANT CHANGE UP
CREAT SERPL-MCNC: 1.05 MG/DL — SIGNIFICANT CHANGE UP (ref 0.5–1.3)
DIFF PNL FLD: NEGATIVE — SIGNIFICANT CHANGE UP
EPI CELLS # UR: SIGNIFICANT CHANGE UP /HPF (ref 0–5)
GLUCOSE SERPL-MCNC: 194 MG/DL — HIGH (ref 70–99)
GLUCOSE UR QL: 500
HBA1C BLD-MCNC: 8.6 % — HIGH (ref 4–5.6)
HCT VFR BLD CALC: 42.7 % — SIGNIFICANT CHANGE UP (ref 39–50)
HGB BLD-MCNC: 13.4 G/DL — SIGNIFICANT CHANGE UP (ref 13–17)
INR BLD: 1.16 — SIGNIFICANT CHANGE UP (ref 0.88–1.16)
KETONES UR-MCNC: NEGATIVE — SIGNIFICANT CHANGE UP
LEUKOCYTE ESTERASE UR-ACNC: NEGATIVE — SIGNIFICANT CHANGE UP
MCHC RBC-ENTMCNC: 24.5 PG — LOW (ref 27–34)
MCHC RBC-ENTMCNC: 31.4 G/DL — LOW (ref 32–36)
MCV RBC AUTO: 78.2 FL — LOW (ref 80–100)
NITRITE UR-MCNC: NEGATIVE — SIGNIFICANT CHANGE UP
PH UR: 5.5 — SIGNIFICANT CHANGE UP (ref 5–8)
PLATELET # BLD AUTO: 279 K/UL — SIGNIFICANT CHANGE UP (ref 150–400)
POTASSIUM SERPL-MCNC: 4.9 MMOL/L — SIGNIFICANT CHANGE UP (ref 3.5–5.3)
POTASSIUM SERPL-SCNC: 4.9 MMOL/L — SIGNIFICANT CHANGE UP (ref 3.5–5.3)
PROT UR-MCNC: (no result) MG/DL
PROTHROM AB SERPL-ACNC: 12.9 SEC — HIGH (ref 9.8–12.7)
RBC # BLD: 5.46 M/UL — SIGNIFICANT CHANGE UP (ref 4.2–5.8)
RBC # FLD: 16.9 % — SIGNIFICANT CHANGE UP (ref 10.3–16.9)
RBC CASTS # UR COMP ASSIST: < 5 /HPF — SIGNIFICANT CHANGE UP
SODIUM SERPL-SCNC: 138 MMOL/L — SIGNIFICANT CHANGE UP (ref 135–145)
SP GR SPEC: 1.02 — SIGNIFICANT CHANGE UP (ref 1–1.03)
UROBILINOGEN FLD QL: 0.2 E.U./DL — SIGNIFICANT CHANGE UP
WBC # BLD: 10.2 K/UL — SIGNIFICANT CHANGE UP (ref 3.8–10.5)
WBC # FLD AUTO: 10.2 K/UL — SIGNIFICANT CHANGE UP (ref 3.8–10.5)
WBC UR QL: < 5 /HPF — SIGNIFICANT CHANGE UP

## 2018-05-03 PROCEDURE — 87086 URINE CULTURE/COLONY COUNT: CPT

## 2018-05-03 PROCEDURE — 80048 BASIC METABOLIC PNL TOTAL CA: CPT

## 2018-05-03 PROCEDURE — 85730 THROMBOPLASTIN TIME PARTIAL: CPT

## 2018-05-03 PROCEDURE — 83036 HEMOGLOBIN GLYCOSYLATED A1C: CPT

## 2018-05-03 PROCEDURE — 73564 X-RAY EXAM KNEE 4 OR MORE: CPT

## 2018-05-03 PROCEDURE — 99215 OFFICE O/P EST HI 40 MIN: CPT

## 2018-05-03 PROCEDURE — 87186 SC STD MICRODIL/AGAR DIL: CPT

## 2018-05-03 PROCEDURE — 73564 X-RAY EXAM KNEE 4 OR MORE: CPT | Mod: 26,50

## 2018-05-03 PROCEDURE — 72170 X-RAY EXAM OF PELVIS: CPT | Mod: 26

## 2018-05-03 PROCEDURE — 72170 X-RAY EXAM OF PELVIS: CPT

## 2018-05-03 PROCEDURE — 81001 URINALYSIS AUTO W/SCOPE: CPT

## 2018-05-03 PROCEDURE — 85027 COMPLETE CBC AUTOMATED: CPT

## 2018-05-03 PROCEDURE — 85610 PROTHROMBIN TIME: CPT

## 2018-05-03 RX ORDER — CYCLOBENZAPRINE HYDROCHLORIDE 10 MG/1
10 TABLET, FILM COATED ORAL
Qty: 60 | Refills: 3 | Status: DISCONTINUED | COMMUNITY
Start: 2017-11-16 | End: 2018-05-03

## 2018-05-06 LAB
-  AMPICILLIN: SIGNIFICANT CHANGE UP
-  CIPROFLOXACIN: SIGNIFICANT CHANGE UP
-  NITROFURANTOIN: SIGNIFICANT CHANGE UP
-  TETRACYCLINE: SIGNIFICANT CHANGE UP
-  VANCOMYCIN: SIGNIFICANT CHANGE UP
CULTURE RESULTS: SIGNIFICANT CHANGE UP
METHOD TYPE: SIGNIFICANT CHANGE UP
ORGANISM # SPEC MICROSCOPIC CNT: SIGNIFICANT CHANGE UP
ORGANISM # SPEC MICROSCOPIC CNT: SIGNIFICANT CHANGE UP
SPECIMEN SOURCE: SIGNIFICANT CHANGE UP

## 2018-05-07 LAB
ANABASINE UR-MCNC: <2 NG/ML — SIGNIFICANT CHANGE UP
COTININE UR-MCNC: <5 NG/ML — SIGNIFICANT CHANGE UP
NICOTINE UR-MCNC: <5 NG/ML — SIGNIFICANT CHANGE UP
NORNICOTINE UR-MCNC: <2 NG/ML — SIGNIFICANT CHANGE UP

## 2018-05-14 RX ORDER — DULOXETINE HYDROCHLORIDE 30 MG/1
CAPSULE, DELAYED RELEASE ORAL
Refills: 0 | Status: DISCONTINUED | COMMUNITY
End: 2018-05-14

## 2018-05-18 ENCOUNTER — APPOINTMENT (OUTPATIENT)
Dept: HEART AND VASCULAR | Facility: CLINIC | Age: 63
End: 2018-05-18
Payer: COMMERCIAL

## 2018-05-18 VITALS
HEART RATE: 68 BPM | HEIGHT: 73 IN | WEIGHT: 260 LBS | DIASTOLIC BLOOD PRESSURE: 60 MMHG | BODY MASS INDEX: 34.46 KG/M2 | SYSTOLIC BLOOD PRESSURE: 110 MMHG

## 2018-05-18 DIAGNOSIS — I10 ESSENTIAL (PRIMARY) HYPERTENSION: ICD-10-CM

## 2018-05-18 LAB
BASOPHILS # BLD AUTO: 0.05 K/UL
BASOPHILS NFR BLD AUTO: 0.5 %
EOSINOPHIL # BLD AUTO: 0.43 K/UL
EOSINOPHIL NFR BLD AUTO: 4.4 %
HCT VFR BLD CALC: 40.1 %
HGB BLD-MCNC: 12.5 G/DL
IMM GRANULOCYTES NFR BLD AUTO: 0.2 %
LYMPHOCYTES # BLD AUTO: 1.88 K/UL
LYMPHOCYTES NFR BLD AUTO: 19.3 %
MAN DIFF?: NORMAL
MCHC RBC-ENTMCNC: 24.9 PG
MCHC RBC-ENTMCNC: 31.2 GM/DL
MCV RBC AUTO: 79.9 FL
MONOCYTES # BLD AUTO: 0.82 K/UL
MONOCYTES NFR BLD AUTO: 8.4 %
NEUTROPHILS # BLD AUTO: 6.54 K/UL
NEUTROPHILS NFR BLD AUTO: 67.2 %
PLATELET # BLD AUTO: 230 K/UL
RBC # BLD: 5.02 M/UL
RBC # FLD: 17.7 %
WBC # FLD AUTO: 9.74 K/UL

## 2018-05-18 PROCEDURE — 93306 TTE W/DOPPLER COMPLETE: CPT

## 2018-05-18 PROCEDURE — 36415 COLL VENOUS BLD VENIPUNCTURE: CPT

## 2018-05-18 PROCEDURE — 99215 OFFICE O/P EST HI 40 MIN: CPT | Mod: 25

## 2018-05-18 PROCEDURE — 93000 ELECTROCARDIOGRAM COMPLETE: CPT

## 2018-05-18 RX ORDER — METOPROLOL SUCCINATE 50 MG/1
50 TABLET, EXTENDED RELEASE ORAL
Qty: 90 | Refills: 0 | Status: DISCONTINUED | COMMUNITY
Start: 2018-03-25

## 2018-05-18 RX ORDER — ROSUVASTATIN CALCIUM 5 MG/1
5 TABLET, FILM COATED ORAL
Qty: 90 | Refills: 0 | Status: DISCONTINUED | COMMUNITY
Start: 2017-11-16

## 2018-05-22 ENCOUNTER — FORM ENCOUNTER (OUTPATIENT)
Age: 63
End: 2018-05-22

## 2018-05-22 LAB
ALBUMIN SERPL ELPH-MCNC: 4.3 G/DL
ALP BLD-CCNC: 90 U/L
ALT SERPL-CCNC: 38 U/L
ANION GAP SERPL CALC-SCNC: 13 MMOL/L
APTT BLD: 27.6 SEC
AST SERPL-CCNC: 26 U/L
BILIRUB SERPL-MCNC: 0.6 MG/DL
BUN SERPL-MCNC: 34 MG/DL
CALCIUM SERPL-MCNC: 9.2 MG/DL
CHLORIDE SERPL-SCNC: 98 MMOL/L
CHOLEST SERPL-MCNC: 108 MG/DL
CHOLEST/HDLC SERPL: 2.3 RATIO
CO2 SERPL-SCNC: 28 MMOL/L
CREAT SERPL-MCNC: 1.15 MG/DL
GLUCOSE SERPL-MCNC: 216 MG/DL
HDLC SERPL-MCNC: 47 MG/DL
INR PPP: 1 RATIO
LDLC SERPL CALC-MCNC: 40 MG/DL
POTASSIUM SERPL-SCNC: 5.6 MMOL/L
PROT SERPL-MCNC: 7.1 G/DL
PT BLD: 11.3 SEC
SODIUM SERPL-SCNC: 139 MMOL/L
TRIGL SERPL-MCNC: 107 MG/DL
TSH SERPL-ACNC: 5.61 UIU/ML

## 2018-05-23 ENCOUNTER — OUTPATIENT (OUTPATIENT)
Dept: OUTPATIENT SERVICES | Facility: HOSPITAL | Age: 63
LOS: 1 days | End: 2018-05-23
Payer: COMMERCIAL

## 2018-05-23 ENCOUNTER — APPOINTMENT (OUTPATIENT)
Dept: CT IMAGING | Facility: HOSPITAL | Age: 63
End: 2018-05-23

## 2018-05-23 VITALS
RESPIRATION RATE: 18 BRPM | OXYGEN SATURATION: 100 % | DIASTOLIC BLOOD PRESSURE: 74 MMHG | HEART RATE: 63 BPM | SYSTOLIC BLOOD PRESSURE: 124 MMHG | TEMPERATURE: 97 F

## 2018-05-23 DIAGNOSIS — Z22.321 CARRIER OR SUSPECTED CARRIER OF METHICILLIN SUSCEPTIBLE STAPHYLOCOCCUS AUREUS: ICD-10-CM

## 2018-05-23 LAB
MRSA PCR RESULT.: NEGATIVE — SIGNIFICANT CHANGE UP
S AUREUS DNA NOSE QL NAA+PROBE: NEGATIVE — SIGNIFICANT CHANGE UP

## 2018-05-23 PROCEDURE — 71046 X-RAY EXAM CHEST 2 VIEWS: CPT | Mod: 26

## 2018-05-23 PROCEDURE — 87641 MR-STAPH DNA AMP PROBE: CPT

## 2018-05-23 PROCEDURE — 71046 X-RAY EXAM CHEST 2 VIEWS: CPT

## 2018-05-23 NOTE — H&P ADULT - ASSESSMENT
61 y/o M with PMHx of HTN, HLD, chronic systolic CHF (25-30%), CAD s/p PCI most recently in 2013 @[facility], DM2 with peripheral neuropathy, and depression presents for cardiac catheterization with possible intervention if clinically indicated due to patient's risk factors, prior abnormal NST, reduced EF and need for risk stratification for upcoming surgery.     -Of note: Dr. Campuzano aware of upcoming surgery; to load patient with ASA/Plavix per Dr. Campuzano prior to procedure.  -Consent also signed and in chart for insulin pump usage.     Risks & benefits of procedure and alternative therapy have been explained to the patient including but not limited to: allergic reaction, bleeding w/possible need for blood transfusion, infection, renal and vascular compromise, limb damage, arrhythmia, stroke, vessel dissection/perforation, Myocardial infarction, emergent CABG. Informed consent obtained and in chart.

## 2018-05-23 NOTE — H&P ADULT - PMH
Atherosclerosis of coronary artery  CAD (coronary artery disease)  Chronic systolic CHF (congestive heart failure)    History of surgery to heart and great vessels, presenting hazards to health  x 4 in 2005  HLD (hyperlipidemia)    HTN (hypertension)    Hyperkalemia    Osteoarthritis    Status post percutaneous transluminal coronary angioplasty  in 2012  Type 2 diabetes mellitus  DM (diabetes mellitus), type 2  Type 2 diabetes mellitus with neurological manifestations  Diabetic neuropathy with neurologic complication

## 2018-05-23 NOTE — H&P ADULT - HISTORY OF PRESENT ILLNESS
*SKELETON    63 y/o M with PMHx of HTN, HLD, chronic systolic CHF (25-30%), CAD s/p PCI most recently in 2013 @[facility], DM2 with peripheral neuropathy, and depression who presented to his cardiologist for pre-operative clearance for right total knee replacement scheduled with Dr. Estes on 6/4/18. He reports  limited exercise tolerance due to knee pain. He reports cycling and walking up ramps without chest pain or SOB. He denies? EKG shows TWI as per MD note. NST from 8/17/17 showed abnormal myocardial perfusion images consistent with scarring in the anterior and anterolateral walls as well as mild stress-induced ischemia at the margins of the defect, overall LV systolic function reduced with global hypokinesis with a decreased ejection fraction from an echo 11/27/10. ECHO 11/20/17 showed apical hypokinesis with EF 25-30%. In light of patient’s risk factors, above symptoms, and abnormal NST in the setting of reduced EF, the patient is recommended for cardiac catheterization with possible intervention if clinically indicated to obtain pre-operative clearance for scheduled right TKR.      Cardiac Cath @[facility] 9/6/13: PTCA to midLAD ISR midLAD severe 90% ISR, D1 totally occluded with severe ISR with partial retrograde filling via collaterals from RCA, EDP 7, moderate anterior and jae-lateral walls hypokinesis with moderately reduced overall LVEF 40%. PRE OP: RIGHT KNEE SURGERY: 6/4/2018    61 y/o M with PMHx of HTN, HLD, hyperkalemia hx, chronic systolic CHF (25-30%), CAD s/p PCI most recently in 2013 @[facility], DM2 with peripheral neuropathy, and depression who presented to his cardiologist for pre-operative clearance for right total knee replacement scheduled with Dr. Estes on 6/4/18. He reports limited exercise tolerance due to knee pain. He reports cycling and walking up ramps without chest pain or SOB.  EKG shows TWI as per MD note. NST from 8/17/17 showed abnormal myocardial perfusion images consistent with scarring in the anterior and anterolateral walls as well as mild stress-induced ischemia at the margins of the defect, overall LV systolic function reduced with global hypokinesis with a decreased ejection fraction from an echo 11/27/10. ECHO 11/20/17 showed apical hypokinesis with EF 25-30%. Pt reports he remains asymptomatic. In light of patient’s risk factors, abnormal NST, reduced EF, patient is recommended for cardiac catheterization with possible intervention if clinically indicated to obtain pre-operative clearance for scheduled right TKR.      Cardiac Cath @[facility] 9/6/13: PTCA to midLAD ISR midLAD severe 90% ISR, D1 totally occluded with severe ISR with partial retrograde filling via collaterals from RCA, EDP 7, moderate anterior and jae-lateral walls hypokinesis with moderately reduced overall LVEF 40%.      Of note: Dr. Campuzano aware of upcoming surgery; to load patient with ASA/Plavix per Dr. Campuzano prior to procedure.  -Consent also signed and in chart for insulin pump usage.

## 2018-05-23 NOTE — H&P ADULT - RS GEN PE MLT RESP DETAILS PC
good air movement/airway patent/breath sounds equal/normal/clear to auscultation bilaterally/respirations non-labored

## 2018-05-24 ENCOUNTER — APPOINTMENT (OUTPATIENT)
Dept: HEART AND VASCULAR | Facility: CLINIC | Age: 63
End: 2018-05-24

## 2018-05-24 ENCOUNTER — INPATIENT (INPATIENT)
Facility: HOSPITAL | Age: 63
LOS: 0 days | Discharge: HOME | DRG: 287 | End: 2018-05-25
Attending: INTERNAL MEDICINE | Admitting: INTERNAL MEDICINE
Payer: COMMERCIAL

## 2018-05-24 LAB
ALBUMIN SERPL ELPH-MCNC: 4.2 G/DL — SIGNIFICANT CHANGE UP (ref 3.3–5)
ALP SERPL-CCNC: 72 U/L — SIGNIFICANT CHANGE UP (ref 40–120)
ALT FLD-CCNC: 41 U/L — SIGNIFICANT CHANGE UP (ref 10–45)
ANION GAP SERPL CALC-SCNC: 14 MMOL/L
ANION GAP SERPL CALC-SCNC: 9 MMOL/L — SIGNIFICANT CHANGE UP (ref 5–17)
APTT BLD: 28.2 SEC — SIGNIFICANT CHANGE UP (ref 27.5–37.4)
AST SERPL-CCNC: 28 U/L — SIGNIFICANT CHANGE UP (ref 10–40)
BASOPHILS NFR BLD AUTO: 0.7 % — SIGNIFICANT CHANGE UP (ref 0–2)
BILIRUB SERPL-MCNC: 0.6 MG/DL — SIGNIFICANT CHANGE UP (ref 0.2–1.2)
BUN SERPL-MCNC: 23 MG/DL
BUN SERPL-MCNC: 23 MG/DL — SIGNIFICANT CHANGE UP (ref 7–23)
CALCIUM SERPL-MCNC: 9.3 MG/DL — SIGNIFICANT CHANGE UP (ref 8.4–10.5)
CALCIUM SERPL-MCNC: 9.6 MG/DL
CHLORIDE SERPL-SCNC: 104 MMOL/L
CHLORIDE SERPL-SCNC: 98 MMOL/L — SIGNIFICANT CHANGE UP (ref 96–108)
CHOLEST SERPL-MCNC: 110 MG/DL
CHOLEST SERPL-MCNC: 112 MG/DL — SIGNIFICANT CHANGE UP (ref 10–199)
CHOLEST/HDLC SERPL: 2.4 RATIO
CK MB CFR SERPL CALC: 15.1 NG/ML — HIGH (ref 0–6.7)
CK SERPL-CCNC: 558 U/L — HIGH (ref 30–200)
CO2 SERPL-SCNC: 25 MMOL/L
CO2 SERPL-SCNC: 29 MMOL/L — SIGNIFICANT CHANGE UP (ref 22–31)
CREAT SERPL-MCNC: 0.93 MG/DL
CREAT SERPL-MCNC: 1 MG/DL — SIGNIFICANT CHANGE UP (ref 0.5–1.3)
CRP SERPL-MCNC: 0.36 MG/DL — SIGNIFICANT CHANGE UP (ref 0–0.4)
EOSINOPHIL NFR BLD AUTO: 2.4 % — SIGNIFICANT CHANGE UP (ref 0–6)
GLUCOSE BLDC GLUCOMTR-MCNC: 129 MG/DL — HIGH (ref 70–99)
GLUCOSE BLDC GLUCOMTR-MCNC: 211 MG/DL — HIGH (ref 70–99)
GLUCOSE BLDC GLUCOMTR-MCNC: 220 MG/DL — HIGH (ref 70–99)
GLUCOSE BLDC GLUCOMTR-MCNC: 221 MG/DL — HIGH (ref 70–99)
GLUCOSE SERPL-MCNC: 256 MG/DL — HIGH (ref 70–99)
GLUCOSE SERPL-MCNC: 83 MG/DL
HBA1C BLD-MCNC: 8.6 % — HIGH (ref 4–5.6)
HBA1C MFR BLD HPLC: 9 %
HCT VFR BLD CALC: 39.4 % — SIGNIFICANT CHANGE UP (ref 39–50)
HDLC SERPL-MCNC: 46 MG/DL
HDLC SERPL-MCNC: 46 MG/DL — SIGNIFICANT CHANGE UP (ref 40–125)
HGB BLD-MCNC: 12.8 G/DL — LOW (ref 13–17)
INR BLD: 1.02 — SIGNIFICANT CHANGE UP (ref 0.88–1.16)
LDLC SERPL CALC-MCNC: 49 MG/DL
LIPID PNL WITH DIRECT LDL SERPL: 48 MG/DL — SIGNIFICANT CHANGE UP
LYMPHOCYTES # BLD AUTO: 17.4 % — SIGNIFICANT CHANGE UP (ref 13–44)
MCHC RBC-ENTMCNC: 24.7 PG — LOW (ref 27–34)
MCHC RBC-ENTMCNC: 32.5 G/DL — SIGNIFICANT CHANGE UP (ref 32–36)
MCV RBC AUTO: 76.1 FL — LOW (ref 80–100)
MONOCYTES NFR BLD AUTO: 8 % — SIGNIFICANT CHANGE UP (ref 2–14)
NEUTROPHILS NFR BLD AUTO: 71.5 % — SIGNIFICANT CHANGE UP (ref 43–77)
PLATELET # BLD AUTO: 245 K/UL — SIGNIFICANT CHANGE UP (ref 150–400)
POTASSIUM SERPL-MCNC: 5 MMOL/L — SIGNIFICANT CHANGE UP (ref 3.5–5.3)
POTASSIUM SERPL-SCNC: 5 MMOL/L — SIGNIFICANT CHANGE UP (ref 3.5–5.3)
POTASSIUM SERPL-SCNC: 5.1 MMOL/L
PROT SERPL-MCNC: 7.3 G/DL — SIGNIFICANT CHANGE UP (ref 6–8.3)
PROTHROM AB SERPL-ACNC: 11.3 SEC — SIGNIFICANT CHANGE UP (ref 9.8–12.7)
RBC # BLD: 5.18 M/UL — SIGNIFICANT CHANGE UP (ref 4.2–5.8)
RBC # FLD: 17.5 % — HIGH (ref 10.3–16.9)
SODIUM SERPL-SCNC: 136 MMOL/L — SIGNIFICANT CHANGE UP (ref 135–145)
SODIUM SERPL-SCNC: 143 MMOL/L
TOTAL CHOLESTEROL/HDL RATIO MEASUREMENT: 2.4 RATIO — LOW (ref 3.4–9.6)
TRIGL SERPL-MCNC: 77 MG/DL
TRIGL SERPL-MCNC: 92 MG/DL — SIGNIFICANT CHANGE UP (ref 10–149)
WBC # BLD: 10.5 K/UL — SIGNIFICANT CHANGE UP (ref 3.8–10.5)
WBC # FLD AUTO: 10.5 K/UL — SIGNIFICANT CHANGE UP (ref 3.8–10.5)

## 2018-05-24 PROCEDURE — 93458 L HRT ARTERY/VENTRICLE ANGIO: CPT | Mod: 26

## 2018-05-24 PROCEDURE — 93010 ELECTROCARDIOGRAM REPORT: CPT

## 2018-05-24 RX ORDER — GLUCAGON INJECTION, SOLUTION 0.5 MG/.1ML
1 INJECTION, SOLUTION SUBCUTANEOUS ONCE
Qty: 0 | Refills: 0 | Status: DISCONTINUED | OUTPATIENT
Start: 2018-05-24 | End: 2018-05-25

## 2018-05-24 RX ORDER — DEXTROSE 50 % IN WATER 50 %
12.5 SYRINGE (ML) INTRAVENOUS ONCE
Qty: 0 | Refills: 0 | Status: DISCONTINUED | OUTPATIENT
Start: 2018-05-24 | End: 2018-05-24

## 2018-05-24 RX ORDER — SODIUM CHLORIDE 9 MG/ML
500 INJECTION INTRAMUSCULAR; INTRAVENOUS; SUBCUTANEOUS
Qty: 0 | Refills: 0 | Status: DISCONTINUED | OUTPATIENT
Start: 2018-05-24 | End: 2018-05-24

## 2018-05-24 RX ORDER — DEXTROSE 50 % IN WATER 50 %
25 SYRINGE (ML) INTRAVENOUS ONCE
Qty: 0 | Refills: 0 | Status: DISCONTINUED | OUTPATIENT
Start: 2018-05-24 | End: 2018-05-25

## 2018-05-24 RX ORDER — SODIUM CHLORIDE 9 MG/ML
1000 INJECTION, SOLUTION INTRAVENOUS
Qty: 0 | Refills: 0 | Status: DISCONTINUED | OUTPATIENT
Start: 2018-05-24 | End: 2018-05-25

## 2018-05-24 RX ORDER — ATORVASTATIN CALCIUM 80 MG/1
40 TABLET, FILM COATED ORAL AT BEDTIME
Qty: 0 | Refills: 0 | Status: DISCONTINUED | OUTPATIENT
Start: 2018-05-24 | End: 2018-05-25

## 2018-05-24 RX ORDER — DEXTROSE 50 % IN WATER 50 %
12.5 SYRINGE (ML) INTRAVENOUS ONCE
Qty: 0 | Refills: 0 | Status: DISCONTINUED | OUTPATIENT
Start: 2018-05-24 | End: 2018-05-25

## 2018-05-24 RX ORDER — METOPROLOL TARTRATE 50 MG
50 TABLET ORAL DAILY
Qty: 0 | Refills: 0 | Status: DISCONTINUED | OUTPATIENT
Start: 2018-05-24 | End: 2018-05-25

## 2018-05-24 RX ORDER — DEXTROSE 50 % IN WATER 50 %
15 SYRINGE (ML) INTRAVENOUS ONCE
Qty: 0 | Refills: 0 | Status: DISCONTINUED | OUTPATIENT
Start: 2018-05-24 | End: 2018-05-24

## 2018-05-24 RX ORDER — CLOPIDOGREL BISULFATE 75 MG/1
600 TABLET, FILM COATED ORAL ONCE
Qty: 0 | Refills: 0 | Status: COMPLETED | OUTPATIENT
Start: 2018-05-24 | End: 2018-05-24

## 2018-05-24 RX ORDER — INSULIN LISPRO 100/ML
1 VIAL (ML) SUBCUTANEOUS
Qty: 0 | Refills: 0 | Status: DISCONTINUED | OUTPATIENT
Start: 2018-05-24 | End: 2018-05-25

## 2018-05-24 RX ORDER — FUROSEMIDE 40 MG
40 TABLET ORAL EVERY 12 HOURS
Qty: 0 | Refills: 0 | Status: DISCONTINUED | OUTPATIENT
Start: 2018-05-24 | End: 2018-05-25

## 2018-05-24 RX ORDER — ASPIRIN/CALCIUM CARB/MAGNESIUM 324 MG
325 TABLET ORAL ONCE
Qty: 0 | Refills: 0 | Status: COMPLETED | OUTPATIENT
Start: 2018-05-24 | End: 2018-05-24

## 2018-05-24 RX ORDER — SODIUM CHLORIDE 9 MG/ML
1000 INJECTION, SOLUTION INTRAVENOUS
Qty: 0 | Refills: 0 | Status: DISCONTINUED | OUTPATIENT
Start: 2018-05-24 | End: 2018-05-24

## 2018-05-24 RX ORDER — ROSUVASTATIN CALCIUM 5 MG/1
1 TABLET ORAL
Qty: 0 | Refills: 0 | COMMUNITY

## 2018-05-24 RX ORDER — DEXTROSE 50 % IN WATER 50 %
25 SYRINGE (ML) INTRAVENOUS ONCE
Qty: 0 | Refills: 0 | Status: DISCONTINUED | OUTPATIENT
Start: 2018-05-24 | End: 2018-05-24

## 2018-05-24 RX ORDER — DULOXETINE HYDROCHLORIDE 30 MG/1
90 CAPSULE, DELAYED RELEASE ORAL DAILY
Qty: 0 | Refills: 0 | Status: DISCONTINUED | OUTPATIENT
Start: 2018-05-24 | End: 2018-05-25

## 2018-05-24 RX ORDER — ASPIRIN/CALCIUM CARB/MAGNESIUM 324 MG
81 TABLET ORAL DAILY
Qty: 0 | Refills: 0 | Status: DISCONTINUED | OUTPATIENT
Start: 2018-05-25 | End: 2018-05-25

## 2018-05-24 RX ORDER — DEXTROSE 50 % IN WATER 50 %
15 SYRINGE (ML) INTRAVENOUS ONCE
Qty: 0 | Refills: 0 | Status: DISCONTINUED | OUTPATIENT
Start: 2018-05-24 | End: 2018-05-25

## 2018-05-24 RX ORDER — GLUCAGON INJECTION, SOLUTION 0.5 MG/.1ML
1 INJECTION, SOLUTION SUBCUTANEOUS ONCE
Qty: 0 | Refills: 0 | Status: DISCONTINUED | OUTPATIENT
Start: 2018-05-24 | End: 2018-05-24

## 2018-05-24 RX ORDER — CHLORHEXIDINE GLUCONATE 213 G/1000ML
1 SOLUTION TOPICAL ONCE
Qty: 0 | Refills: 0 | Status: DISCONTINUED | OUTPATIENT
Start: 2018-05-24 | End: 2018-05-24

## 2018-05-24 RX ORDER — OMEGA-3 ACID ETHYL ESTERS 1 G
2 CAPSULE ORAL DAILY
Qty: 0 | Refills: 0 | Status: DISCONTINUED | OUTPATIENT
Start: 2018-05-24 | End: 2018-05-25

## 2018-05-24 RX ORDER — INSULIN LISPRO 100/ML
1 VIAL (ML) SUBCUTANEOUS
Qty: 0 | Refills: 0 | Status: DISCONTINUED | OUTPATIENT
Start: 2018-05-24 | End: 2018-05-24

## 2018-05-24 RX ORDER — HYDRALAZINE HCL 50 MG
50 TABLET ORAL
Qty: 0 | Refills: 0 | Status: DISCONTINUED | OUTPATIENT
Start: 2018-05-24 | End: 2018-05-25

## 2018-05-24 RX ORDER — FUROSEMIDE 40 MG
40 TABLET ORAL ONCE
Qty: 0 | Refills: 0 | Status: COMPLETED | OUTPATIENT
Start: 2018-05-24 | End: 2018-05-24

## 2018-05-24 RX ADMIN — Medication 40 MILLIGRAM(S): at 21:31

## 2018-05-24 RX ADMIN — Medication 50 MILLIGRAM(S): at 17:56

## 2018-05-24 RX ADMIN — DULOXETINE HYDROCHLORIDE 90 MILLIGRAM(S): 30 CAPSULE, DELAYED RELEASE ORAL at 21:32

## 2018-05-24 RX ADMIN — CLOPIDOGREL BISULFATE 600 MILLIGRAM(S): 75 TABLET, FILM COATED ORAL at 13:00

## 2018-05-24 RX ADMIN — Medication 40 MILLIGRAM(S): at 14:28

## 2018-05-24 RX ADMIN — Medication 2 GRAM(S): at 17:56

## 2018-05-24 RX ADMIN — SODIUM CHLORIDE 40 MILLILITER(S): 9 INJECTION INTRAMUSCULAR; INTRAVENOUS; SUBCUTANEOUS at 13:01

## 2018-05-24 RX ADMIN — Medication 325 MILLIGRAM(S): at 13:00

## 2018-05-25 ENCOUNTER — TRANSCRIPTION ENCOUNTER (OUTPATIENT)
Age: 63
End: 2018-05-25

## 2018-05-25 VITALS — WEIGHT: 260.15 LBS

## 2018-05-25 LAB
ALBUMIN SERPL ELPH-MCNC: 4 G/DL — SIGNIFICANT CHANGE UP (ref 3.3–5)
ALP SERPL-CCNC: 73 U/L — SIGNIFICANT CHANGE UP (ref 40–120)
ALT FLD-CCNC: 40 U/L — SIGNIFICANT CHANGE UP (ref 10–45)
ANION GAP SERPL CALC-SCNC: 14 MMOL/L — SIGNIFICANT CHANGE UP (ref 5–17)
APPEARANCE UR: CLEAR — SIGNIFICANT CHANGE UP
APTT BLD: 26.9 SEC — LOW (ref 27.5–37.4)
AST SERPL-CCNC: 26 U/L — SIGNIFICANT CHANGE UP (ref 10–40)
BILIRUB SERPL-MCNC: 0.7 MG/DL — SIGNIFICANT CHANGE UP (ref 0.2–1.2)
BILIRUB UR-MCNC: NEGATIVE — SIGNIFICANT CHANGE UP
BLD GP AB SCN SERPL QL: NEGATIVE — SIGNIFICANT CHANGE UP
BUN SERPL-MCNC: 24 MG/DL — HIGH (ref 7–23)
CALCIUM SERPL-MCNC: 9.2 MG/DL — SIGNIFICANT CHANGE UP (ref 8.4–10.5)
CHLORIDE SERPL-SCNC: 96 MMOL/L — SIGNIFICANT CHANGE UP (ref 96–108)
CO2 SERPL-SCNC: 26 MMOL/L — SIGNIFICANT CHANGE UP (ref 22–31)
COLOR SPEC: YELLOW — SIGNIFICANT CHANGE UP
CREAT SERPL-MCNC: 1.06 MG/DL — SIGNIFICANT CHANGE UP (ref 0.5–1.3)
DIFF PNL FLD: NEGATIVE — SIGNIFICANT CHANGE UP
GLUCOSE BLDC GLUCOMTR-MCNC: 228 MG/DL — HIGH (ref 70–99)
GLUCOSE BLDC GLUCOMTR-MCNC: 291 MG/DL — HIGH (ref 70–99)
GLUCOSE BLDC GLUCOMTR-MCNC: 320 MG/DL — HIGH (ref 70–99)
GLUCOSE SERPL-MCNC: 227 MG/DL — HIGH (ref 70–99)
GLUCOSE UR QL: 500
HCT VFR BLD CALC: 41.4 % — SIGNIFICANT CHANGE UP (ref 39–50)
HGB BLD-MCNC: 13.8 G/DL — SIGNIFICANT CHANGE UP (ref 13–17)
INR BLD: 1.06 — SIGNIFICANT CHANGE UP (ref 0.88–1.16)
KETONES UR-MCNC: NEGATIVE — SIGNIFICANT CHANGE UP
LEUKOCYTE ESTERASE UR-ACNC: NEGATIVE — SIGNIFICANT CHANGE UP
MAGNESIUM SERPL-MCNC: 1.9 MG/DL — SIGNIFICANT CHANGE UP (ref 1.6–2.6)
MCHC RBC-ENTMCNC: 25.1 PG — LOW (ref 27–34)
MCHC RBC-ENTMCNC: 33.3 G/DL — SIGNIFICANT CHANGE UP (ref 32–36)
MCV RBC AUTO: 75.3 FL — LOW (ref 80–100)
NITRITE UR-MCNC: NEGATIVE — SIGNIFICANT CHANGE UP
PH UR: 6 — SIGNIFICANT CHANGE UP (ref 5–8)
PLATELET # BLD AUTO: 236 K/UL — SIGNIFICANT CHANGE UP (ref 150–400)
POTASSIUM SERPL-MCNC: 4.3 MMOL/L — SIGNIFICANT CHANGE UP (ref 3.5–5.3)
POTASSIUM SERPL-SCNC: 4.3 MMOL/L — SIGNIFICANT CHANGE UP (ref 3.5–5.3)
PROT SERPL-MCNC: 7.5 G/DL — SIGNIFICANT CHANGE UP (ref 6–8.3)
PROT UR-MCNC: NEGATIVE MG/DL — SIGNIFICANT CHANGE UP
PROTHROM AB SERPL-ACNC: 11.8 SEC — SIGNIFICANT CHANGE UP (ref 9.8–12.7)
RBC # BLD: 5.5 M/UL — SIGNIFICANT CHANGE UP (ref 4.2–5.8)
RBC # FLD: 17.8 % — HIGH (ref 10.3–16.9)
RH IG SCN BLD-IMP: POSITIVE — SIGNIFICANT CHANGE UP
SODIUM SERPL-SCNC: 136 MMOL/L — SIGNIFICANT CHANGE UP (ref 135–145)
SP GR SPEC: 1.01 — SIGNIFICANT CHANGE UP (ref 1–1.03)
UROBILINOGEN FLD QL: 0.2 E.U./DL — SIGNIFICANT CHANGE UP
WBC # BLD: 10 K/UL — SIGNIFICANT CHANGE UP (ref 3.8–10.5)
WBC # FLD AUTO: 10 K/UL — SIGNIFICANT CHANGE UP (ref 3.8–10.5)

## 2018-05-25 PROCEDURE — 99223 1ST HOSP IP/OBS HIGH 75: CPT

## 2018-05-25 PROCEDURE — C1887: CPT

## 2018-05-25 PROCEDURE — 85025 COMPLETE CBC W/AUTO DIFF WBC: CPT

## 2018-05-25 PROCEDURE — 85027 COMPLETE CBC AUTOMATED: CPT

## 2018-05-25 PROCEDURE — 93880 EXTRACRANIAL BILAT STUDY: CPT

## 2018-05-25 PROCEDURE — 82553 CREATINE MB FRACTION: CPT

## 2018-05-25 PROCEDURE — 93306 TTE W/DOPPLER COMPLETE: CPT | Mod: 26

## 2018-05-25 PROCEDURE — 85610 PROTHROMBIN TIME: CPT

## 2018-05-25 PROCEDURE — 83036 HEMOGLOBIN GLYCOSYLATED A1C: CPT

## 2018-05-25 PROCEDURE — 86140 C-REACTIVE PROTEIN: CPT

## 2018-05-25 PROCEDURE — 93880 EXTRACRANIAL BILAT STUDY: CPT | Mod: 26

## 2018-05-25 PROCEDURE — 78491 MYOCRD IMG PET 1STD RST/STRS: CPT | Mod: 26

## 2018-05-25 PROCEDURE — 36415 COLL VENOUS BLD VENIPUNCTURE: CPT

## 2018-05-25 PROCEDURE — 81003 URINALYSIS AUTO W/O SCOPE: CPT

## 2018-05-25 PROCEDURE — 80053 COMPREHEN METABOLIC PANEL: CPT

## 2018-05-25 PROCEDURE — 86850 RBC ANTIBODY SCREEN: CPT

## 2018-05-25 PROCEDURE — 82550 ASSAY OF CK (CPK): CPT

## 2018-05-25 PROCEDURE — 86900 BLOOD TYPING SEROLOGIC ABO: CPT

## 2018-05-25 PROCEDURE — 93005 ELECTROCARDIOGRAM TRACING: CPT

## 2018-05-25 PROCEDURE — 87086 URINE CULTURE/COLONY COUNT: CPT

## 2018-05-25 PROCEDURE — 94150 VITAL CAPACITY TEST: CPT

## 2018-05-25 PROCEDURE — 94010 BREATHING CAPACITY TEST: CPT | Mod: 26

## 2018-05-25 PROCEDURE — C1769: CPT

## 2018-05-25 PROCEDURE — 85730 THROMBOPLASTIN TIME PARTIAL: CPT

## 2018-05-25 PROCEDURE — C8929: CPT

## 2018-05-25 PROCEDURE — 83735 ASSAY OF MAGNESIUM: CPT

## 2018-05-25 PROCEDURE — 78452 HT MUSCLE IMAGE SPECT MULT: CPT

## 2018-05-25 PROCEDURE — 82962 GLUCOSE BLOOD TEST: CPT

## 2018-05-25 PROCEDURE — A9505: CPT

## 2018-05-25 PROCEDURE — 80061 LIPID PANEL: CPT

## 2018-05-25 PROCEDURE — 86901 BLOOD TYPING SEROLOGIC RH(D): CPT

## 2018-05-25 RX ORDER — DEXTROSE 50 % IN WATER 50 %
12.5 SYRINGE (ML) INTRAVENOUS ONCE
Qty: 0 | Refills: 0 | Status: DISCONTINUED | OUTPATIENT
Start: 2018-05-25 | End: 2018-05-25

## 2018-05-25 RX ORDER — INSULIN LISPRO 100/ML
VIAL (ML) SUBCUTANEOUS
Qty: 0 | Refills: 0 | Status: DISCONTINUED | OUTPATIENT
Start: 2018-05-25 | End: 2018-05-25

## 2018-05-25 RX ORDER — IBUPROFEN 200 MG
1 TABLET ORAL
Qty: 0 | Refills: 0 | COMMUNITY

## 2018-05-25 RX ORDER — DEXTROSE 50 % IN WATER 50 %
25 SYRINGE (ML) INTRAVENOUS ONCE
Qty: 0 | Refills: 0 | Status: DISCONTINUED | OUTPATIENT
Start: 2018-05-25 | End: 2018-05-25

## 2018-05-25 RX ORDER — MAGNESIUM OXIDE 400 MG ORAL TABLET 241.3 MG
400 TABLET ORAL ONCE
Qty: 0 | Refills: 0 | Status: COMPLETED | OUTPATIENT
Start: 2018-05-25 | End: 2018-05-25

## 2018-05-25 RX ORDER — DEXTROSE 50 % IN WATER 50 %
15 SYRINGE (ML) INTRAVENOUS ONCE
Qty: 0 | Refills: 0 | Status: DISCONTINUED | OUTPATIENT
Start: 2018-05-25 | End: 2018-05-25

## 2018-05-25 RX ORDER — SODIUM CHLORIDE 9 MG/ML
1000 INJECTION, SOLUTION INTRAVENOUS
Qty: 0 | Refills: 0 | Status: DISCONTINUED | OUTPATIENT
Start: 2018-05-25 | End: 2018-05-25

## 2018-05-25 RX ORDER — GLUCAGON INJECTION, SOLUTION 0.5 MG/.1ML
1 INJECTION, SOLUTION SUBCUTANEOUS ONCE
Qty: 0 | Refills: 0 | Status: DISCONTINUED | OUTPATIENT
Start: 2018-05-25 | End: 2018-05-25

## 2018-05-25 RX ORDER — FUROSEMIDE 40 MG
1 TABLET ORAL
Qty: 30 | Refills: 3 | OUTPATIENT
Start: 2018-05-25 | End: 2018-09-21

## 2018-05-25 RX ADMIN — Medication 50 MILLIGRAM(S): at 18:12

## 2018-05-25 RX ADMIN — MAGNESIUM OXIDE 400 MG ORAL TABLET 400 MILLIGRAM(S): 241.3 TABLET ORAL at 12:42

## 2018-05-25 RX ADMIN — Medication 50 MILLIGRAM(S): at 06:08

## 2018-05-25 RX ADMIN — Medication 8: at 15:47

## 2018-05-25 RX ADMIN — Medication 81 MILLIGRAM(S): at 12:43

## 2018-05-25 RX ADMIN — Medication 2 GRAM(S): at 12:43

## 2018-05-25 RX ADMIN — Medication 40 MILLIGRAM(S): at 12:43

## 2018-05-25 RX ADMIN — DULOXETINE HYDROCHLORIDE 90 MILLIGRAM(S): 30 CAPSULE, DELAYED RELEASE ORAL at 12:43

## 2018-05-25 NOTE — DISCHARGE NOTE ADULT - MEDICATION SUMMARY - MEDICATIONS TO STOP TAKING
I will STOP taking the medications listed below when I get home from the hospital:    Motrin 800 mg oral tablet  -- 1 tab(s) by mouth 3 times a day, As Needed

## 2018-05-25 NOTE — DISCHARGE NOTE ADULT - CARE PROVIDER_API CALL
BLUE Tolentino), Thoracic and Cardiac Surgery  [address]  [address] Located within [place] [address]  Phone: [phone]  Fax: [phone]

## 2018-05-25 NOTE — DISCHARGE NOTE ADULT - PATIENT PORTAL LINK FT
You can access the [facility] Patient Portal, offered by [facility], by registering with the following website: [email or web address]

## 2018-05-25 NOTE — DISCHARGE NOTE ADULT - CARE PLAN
Principal Discharge DX:	Atherosclerosis of coronary artery  Goal:	You have blockages in your left anterior descending coronary artery, 1st Diagonal, and mid Right coronary artery. You need to have open heart surgery to fix the blockages.  Assessment and plan of treatment:	Follow up with Dr. Tolentino on Tuesday 5/29/18 at 130pm.     Right wrist wound care:  Secondary Diagnosis:	HTN (hypertension)  Secondary Diagnosis:	HLD (hyperlipidemia)  Secondary Diagnosis:	Chronic systolic CHF (congestive heart failure)  Secondary Diagnosis:	Type 2 diabetes mellitus with neurological manifestations, controlled Principal Discharge DX:	Atherosclerosis of coronary artery  Goal:	You have blockages in your left anterior descending coronary artery, 1st Diagonal, and mid Right coronary artery. You need to have open heart surgery to fix the blockages.  Assessment and plan of treatment:	Follow up with Dr. Tolentino on Tuesday 5/29/18 at 130pm.     Right wrist wound care: do not lift objects heavier than 5 lbs for 5 days. Observe for signs of bleeding including bleeding/sudden swelling to your right wrist site or numbness/tingling/pain/coolness to your right wrist/hand/fingers/or forearm. If you experience these symptoms call your doctor and go to the nearest ED immediately. Follow up with Dr Tolentino on Tuesday 5/29/18 at 130pm.  Secondary Diagnosis:	HTN (hypertension)  Goal:	Continue blood pressure medications  Secondary Diagnosis:	HLD (hyperlipidemia)  Goal:	Continue Crestor 10mg once daily at bedtime  Secondary Diagnosis:	Chronic systolic CHF (congestive heart failure)  Goal:	START taking LASIX (FUROSEMIDE) 40mg once daily.  Secondary Diagnosis:	Type 2 diabetes mellitus with neurological manifestations, controlled  Goal:	Continue diabetes medications. Principal Discharge DX:	Atherosclerosis of coronary artery  Goal:	You have blockages in your left anterior descending coronary artery, 1st Diagonal, and mid Right coronary artery. You need to have open heart surgery to fix the blockages.  Assessment and plan of treatment:	Follow up with Dr. Tolentino on Tuesday 5/29/18 at 130pm.     Right wrist wound care: do not lift objects heavier than 5 lbs for 5 days. Observe for signs of bleeding including bleeding/sudden swelling to your right wrist site or numbness/tingling/pain/coolness to your right wrist/hand/fingers/or forearm. If you experience these symptoms call your doctor and go to the nearest ED immediately. Follow up with Dr Tolentino on Tuesday 5/29/18 at 130pm. [address] [place] [address]. Phone [phone].  Secondary Diagnosis:	HTN (hypertension)  Goal:	Continue blood pressure medications  Secondary Diagnosis:	HLD (hyperlipidemia)  Goal:	Continue Crestor 10mg once daily at bedtime  Secondary Diagnosis:	Chronic systolic CHF (congestive heart failure)  Goal:	START taking LASIX (FUROSEMIDE) 40mg once daily. This is a diuretic/ water pill to help you keep a good fluid balance.  Secondary Diagnosis:	Type 2 diabetes mellitus with neurological manifestations, controlled  Goal:	Continue diabetes medications.

## 2018-05-25 NOTE — DISCHARGE NOTE ADULT - SECONDARY DIAGNOSIS.
HTN (hypertension) HLD (hyperlipidemia) Chronic systolic CHF (congestive heart failure) Type 2 diabetes mellitus with neurological manifestations, controlled

## 2018-05-25 NOTE — DISCHARGE NOTE ADULT - MEDICATION SUMMARY - MEDICATIONS TO TAKE
I will START or STAY ON the medications listed below when I get home from the hospital:    aspirin 81 mg oral tablet  -- 1 tab(s) by mouth once a day  -- Indication: For Coronary artery disease    Cymbalta  -- 90 milligram(s) by mouth once a day  -- Indication: For Mood    HumaLOG  -- Pump  -- Indication: For Diabetes    Zetia 10 mg oral tablet  -- 1 tab(s) by mouth once a day  -- Indication: For Cholesterol control    Crestor 10 mg oral tablet  -- 1 tab(s) by mouth once a day (at bedtime)  -- Indication: For Cholesterol control    Metoprolol Succinate ER 50 mg oral tablet, extended release  -- 1 tab(s) by mouth once a day  -- Indication: For Blood pressure control/helping your heart to pump strongly    Veltassa 8.4 g oral powder for reconstitution  -- 1 tab(s) by mouth 2 times a day  -- Indication: For lowering your potassium levels    furosemide 40 mg oral tablet  -- 1 tab(s) by mouth once a day   -- Avoid prolonged or excessive exposure to direct and/or artificial sunlight while taking this medication.  It is very important that you take or use this exactly as directed.  Do not skip doses or discontinue unless directed by your doctor.  It may be advisable to drink a full glass orange juice or eat a banana daily while taking this medication.    -- Indication: For NEW MEDICATION. Diuretic. Fluid balance.     Omega-3 oral capsule  -- 1 tab(s) by mouth once a day  -- Indication: For Supplement    hydrALAZINE 50 mg oral tablet  -- 1 tab(s) by mouth 2 times a day  -- Indication: For Blood pressure control/Helping your heart to pump more strongly

## 2018-05-25 NOTE — DISCHARGE NOTE ADULT - PLAN OF CARE
You have blockages in your left anterior descending coronary artery, 1st Diagonal, and mid Right coronary artery. You need to have open heart surgery to fix the blockages. Follow up with Dr. Tolentino on Tuesday 5/29/18 at 130pm.     Right wrist wound care: Continue Crestor 10mg once daily at bedtime START taking LASIX (FUROSEMIDE) 40mg once daily. Continue diabetes medications. Follow up with Dr. Tolentino on Tuesday 5/29/18 at 130pm.     Right wrist wound care: do not lift objects heavier than 5 lbs for 5 days. Observe for signs of bleeding including bleeding/sudden swelling to your right wrist site or numbness/tingling/pain/coolness to your right wrist/hand/fingers/or forearm. If you experience these symptoms call your doctor and go to the nearest ED immediately. Follow up with Dr Tolentino on Tuesday 5/29/18 at 130pm. Continue blood pressure medications Follow up with Dr. Tolentino on Tuesday 5/29/18 at 130pm.     Right wrist wound care: do not lift objects heavier than 5 lbs for 5 days. Observe for signs of bleeding including bleeding/sudden swelling to your right wrist site or numbness/tingling/pain/coolness to your right wrist/hand/fingers/or forearm. If you experience these symptoms call your doctor and go to the nearest ED immediately. Follow up with Dr Tolentino on Tuesday 5/29/18 at 130pm. [address] [place] [address]. Phone [phone]. START taking LASIX (FUROSEMIDE) 40mg once daily. This is a diuretic/ water pill to help you keep a good fluid balance.

## 2018-05-25 NOTE — PROGRESS NOTE ADULT - SUBJECTIVE AND OBJECTIVE BOX
Surgeon:    Requesting Physician:    HISTORY OF PRESENT ILLNESS:  62y Male with PMHx of HTN, HLD, hyperkalemia hx, chronic systolic CHF (25-30%), CAD s/p PCI most recently in 2013 @[facility], DM2 with peripheral neuropathy, and depression who presented to his cardiologist for pre-operative clearance for right total knee replacement scheduled with Dr. Estes on 6/4/18. He reports limited exercise tolerance due to knee pain. He reports cycling and walking up ramps without chest pain or SOB.  EKG shows TWI as per MD note. NST from 8/17/17 showed abnormal myocardial perfusion images consistent with scarring in the anterior and anterolateral walls as well as mild stress-induced ischemia at the margins of the defect, overall LV systolic function reduced with global hypokinesis with a decreased ejection fraction from an echo 11/27/10. ECHO 11/20/17 showed apical hypokinesis with EF 25-30%. Pt reports he remains asymptomatic. In light of patient’s risk factors, abnormal NST, reduced EF, patient is recommended for cardiac catheterization with possible intervention if clinically indicated to obtain pre-operative clearance for scheduled right TKR.      Cardiac Cath @[facility] 9/6/13: PTCA to midLAD ISR midLAD severe 90% ISR, D1 totally occluded with severe ISR with partial retrograde filling via collaterals from RCA, EDP 7, moderate anterior and jae-lateral walls hypokinesis with moderately reduced overall LVEF 40%.      Of note: Dr. Campuzano aware of upcoming surgery; to load patient with ASA/Plavix per Dr. Campuzano prior to procedure.  -Consent also signed and in chart for insulin pump usage.     PAST MEDICAL & SURGICAL HISTORY:  Hyperkalemia  HLD (hyperlipidemia)  Chronic systolic CHF (congestive heart failure)  Osteoarthritis  HTN (hypertension)  Type 2 diabetes mellitus with neurological manifestations: Diabetic neuropathy with neurologic complication  Type 2 diabetes mellitus: DM (diabetes mellitus), type 2  History of surgery to heart and great vessels, presenting hazards to health: x 4 in 2005  Atherosclerosis of coronary artery: CAD (coronary artery disease)  Status post percutaneous transluminal coronary angioplasty: in 2012  History of surgery to major organs, presenting hazards to health: x 2  Other postprocedural status: Fixation hardware in foot      MEDICATIONS  (STANDING):  aspirin enteric coated 81 milliGRAM(s) Oral daily  atorvastatin 40 milliGRAM(s) Oral at bedtime  dextrose 5%. 1000 milliLiter(s) (50 mL/Hr) IV Continuous <Continuous>  dextrose 50% Injectable 12.5 Gram(s) IV Push once  dextrose 50% Injectable 25 Gram(s) IV Push once  dextrose 50% Injectable 25 Gram(s) IV Push once  DULoxetine 90 milliGRAM(s) Oral daily  furosemide   Injectable 40 milliGRAM(s) IV Push every 12 hours  hydrALAZINE 50 milliGRAM(s) Oral two times a day  insulin lispro (HumaLOG) corrective regimen sliding scale   SubCutaneous Before meals and at bedtime  metoprolol succinate ER 50 milliGRAM(s) Oral daily  omega-3-Acid Ethyl Esters 2 Gram(s) Oral daily    MEDICATIONS  (PRN):  dextrose 40% Gel 15 Gram(s) Oral once PRN Blood Glucose LESS THAN 70 milliGRAM(s)/deciliter  glucagon  Injectable 1 milliGRAM(s) IntraMuscular once PRN Glucose LESS THAN 70 milligrams/deciliter      Allergies    ACE inhibitors (Hives)  enalapril (Hives)    Intolerances        SOCIAL HISTORY:  Smoker:  YES / NO        PACK YEARS:                         WHEN QUIT?  ETOH use:  YES / NO               FREQUENCY / QUANTITY:  Ilicit Drug use:  YES / NO  Occupation:  Assisted device use (Cane / Walker):  Live with:    FAMILY HISTORY:      Review of Systems  CONSTITUTIONAL:  Fevers / chills, sweats, fatigue, weight loss, weight gain                                    NEGATIVE  NEURO:  parathesias, seizures, syncope, confusion                                                                               NEGATIVE  EYES:  Blurry vision, discharge, pain, loss of vision                                                                                  NEGATIVE  ENMT:  Difficulty hearing, vertigo, dysphagia, epistaxis, recent dental work                                     NEGATIVE  CV:  Chest pain, palpitations, JUNG, orthopnea                                                                                         NEGATIVE  RESPIRATORY:  Wheezing, SOB, cough / sputum, hemoptysis                                                              NEGATIVE  GI:  Nausea, vommiting, diarrhea, constipation, melena                                                                        NEGATIVE  : Hematuria, dysuria, urgency, incontinence                                                                                       NEGATIVE  MUSKULOSKELETAL:  arthritis, joint swelling, muscle weakness                                                           NEGATIVE  SKIN/BREAST:  rash, itching, farhat loss, masses                                                                                            NEGATIVE  PSYCH:  depresion, anxiety, suicidal ideation                                                                                             NEGATIVE  HEME/LYMPH:  bruises easily, enlarged lymph nodes, tender lymph nodes                                        NEGATIVE  ENDOCRINE:  cold intolerance, heat intolerance, polydipsia                                                                   NEGATIVE    PHYSICAL EXAM  Vital Signs Last 24 Hrs  T(C): 36.6 (25 May 2018 05:12), Max: 37.2 (25 May 2018 00:08)  T(F): 97.9 (25 May 2018 05:12), Max: 98.9 (25 May 2018 00:08)  HR: 80 (25 May 2018 05:20) (62 - 80)  BP: 136/74 (25 May 2018 05:20) (119/57 - 157/65)  BP(mean): --  RR: 18 (25 May 2018 05:20) (16 - 20)  SpO2: 96% (25 May 2018 05:20) (96% - 98%)    CONSTITUTIONAL:                                                                          WNL  NEURO:                                                                                             WNL                      EYES:                                                                                                  WNL  ENMT:                                                                                                WNL  CV:                                                                                                      WNL  RESPIRATORY:                                                                                  WNL  GI:                                                                                                       WNL  : CASTILLO + / -                                                                                 WNL  MUSKULOSKELETAL:                                                                       WNL  SKIN / BREAST:                                                                                 WNL  Extremities  Vascular                                                          LABS:                        13.8   10.0  )-----------( 236      ( 25 May 2018 07:26 )             41.4     05-25    136  |  96  |  24<H>  ----------------------------<  227<H>  4.3   |  26  |  1.06    Ca    9.2      25 May 2018 07:26  Mg     1.9     05-25    TPro  7.5  /  Alb  4.0  /  TBili  0.7  /  DBili  x   /  AST  26  /  ALT  40  /  AlkPhos  73  05-25    PT/INR - ( 25 May 2018 07:26 )   PT: 11.8 sec;   INR: 1.06          PTT - ( 25 May 2018 07:26 )  PTT:26.9 sec    CARDIAC MARKERS ( 24 May 2018 11:26 )  x     / x     / 558 U/L / x     / 15.1 ng/mL        Hemoglobin A1C, Whole Blood: 8.6 % (05-24 @ 11:26)        RADIOLOGY & ADDITIONAL STUDIES:  CAROTID U/S:    CXR:    CT Scan:    EKG:    TTE / JOHN PAUL:    Cardiac Cath: CT surgery Consult note    Surgeon:    Requesting Physician:    HISTORY OF PRESENT ILLNESS:  62y Male with PMHx of HTN, HLD, hyperkalemia hx, chronic systolic CHF (25-30%), CAD s/p PCI most recently in 2013 @[facility], DM2 with peripheral neuropathy, and depression who presented to his cardiologist for pre-operative clearance for right total knee replacement scheduled with Dr. Estes on 6/4/18. He reports limited exercise tolerance due to knee pain. He reports cycling and walking up ramps without chest pain or SOB.  EKG shows TWI as per MD note. NST from 8/17/17 showed abnormal myocardial perfusion images consistent with scarring in the anterior and anterolateral walls as well as mild stress-induced ischemia at the margins of the defect, overall LV systolic function reduced with global hypokinesis with a decreased ejection fraction from an echo 11/27/10. ECHO 11/20/17 showed apical hypokinesis with EF 25-30%. Pt reports he remains asymptomatic. In light of patient’s risk factors, abnormal NST, reduced EF, patient is recommended for cardiac catheterization with possible intervention if clinically indicated to obtain pre-operative clearance for scheduled right TKR.      Cardiac Cath @[facility] 9/6/13: PTCA to midLAD ISR midLAD severe 90% ISR, D1 totally occluded with severe ISR with partial retrograde filling via collaterals from RCA, EDP 7, moderate anterior and jae-lateral walls hypokinesis with moderately reduced overall LVEF 40%.      Of note: Dr. Campuzano aware of upcoming surgery; to load patient with ASA/Plavix per Dr. Campuzano prior to procedure.  -Consent also signed and in chart for insulin pump usage.     PAST MEDICAL & SURGICAL HISTORY:  Hyperkalemia  HLD (hyperlipidemia)  Chronic systolic CHF (congestive heart failure)  Osteoarthritis  HTN (hypertension)  Type 2 diabetes mellitus with neurological manifestations: Diabetic neuropathy with neurologic complication  Type 2 diabetes mellitus: DM (diabetes mellitus), type 2  History of surgery to heart and great vessels, presenting hazards to health: x 4 in 2005  Atherosclerosis of coronary artery: CAD (coronary artery disease)  Status post percutaneous transluminal coronary angioplasty: in 2012  History of surgery to major organs, presenting hazards to health: x 2  Other postprocedural status: Fixation hardware in foot      MEDICATIONS  (STANDING):  aspirin enteric coated 81 milliGRAM(s) Oral daily  atorvastatin 40 milliGRAM(s) Oral at bedtimed  dextrose 5%. 1000 milliLiter(s) (50 mL/Hr) IV Continuous <Continuous>  dextrose 50% Injectable 12.5 Gram(s) IV Push once  dextrose 50% Injectable 25 Gram(s) IV Push once  dextrose 50% Injectable 25 Gram(s) IV Push once  DULoxetine 90 milliGRAM(s) Oral daily  furosemide   Injectable 40 milliGRAM(s) IV Push every 12 hours  hydrALAZINE 50 milliGRAM(s) Oral two times a day  insulin lispro (HumaLOG) corrective regimen sliding scale   SubCutaneous Before meals and at bedtime  metoprolol succinate ER 50 milliGRAM(s) Oral daily  omega-3-Acid Ethyl Esters 2 Gram(s) Oral daily    MEDICATIONS  (PRN):  dextrose 40% Gel 15 Gram(s) Oral once PRN Blood Glucose LESS THAN 70 milliGRAM(s)/deciliter  glucagon  Injectable 1 milliGRAM(s) IntraMuscular once PRN Glucose LESS THAN 70 milligrams/deciliter      Allergies    ACE inhibitors (Hives)  enalapril (Hives)    Intolerances        SOCIAL HISTORY:  Smoker:  YES / NO        PACK YEARS:                         WHEN QUIT?  ETOH use:  YES / NO               FREQUENCY / QUANTITY:  Ilicit Drug use:  YES / NO  Occupation:  Assisted device use (Cane / Walker):  Live with:    FAMILY HISTORY:      Review of Systems  CONSTITUTIONAL:  Fevers / chills, sweats, fatigue, weight loss, weight gain                                    NEGATIVE  NEURO:  parathesias, seizures, syncope, confusion                                                                               NEGATIVE  EYES:  Blurry vision, discharge, pain, loss of vision                                                                                  NEGATIVE  ENMT:  Difficulty hearing, vertigo, dysphagia, epistaxis, recent dental work                                     NEGATIVE  CV:  Chest pain, palpitations, JUNG, orthopnea                                                                                         NEGATIVE  RESPIRATORY:  Wheezing, SOB, cough / sputum, hemoptysis                                                              NEGATIVE  GI:  Nausea, vommiting, diarrhea, constipation, melena                                                                        NEGATIVE  : Hematuria, dysuria, urgency, incontinence                                                                                       NEGATIVE  MUSKULOSKELETAL:  arthritis, joint swelling, muscle weakness                                                           NEGATIVE  SKIN/BREAST:  rash, itching, farhat loss, masses                                                                                            NEGATIVE  PSYCH:  depresion, anxiety, suicidal ideation                                                                                             NEGATIVE  HEME/LYMPH:  bruises easily, enlarged lymph nodes, tender lymph nodes                                        NEGATIVE  ENDOCRINE:  cold intolerance, heat intolerance, polydipsia                                                                   NEGATIVE    PHYSICAL EXAM  Vital Signs Last 24 Hrs  T(C): 36.6 (25 May 2018 05:12), Max: 37.2 (25 May 2018 00:08)  T(F): 97.9 (25 May 2018 05:12), Max: 98.9 (25 May 2018 00:08)  HR: 80 (25 May 2018 05:20) (62 - 80)  BP: 136/74 (25 May 2018 05:20) (119/57 - 157/65)  BP(mean): --  RR: 18 (25 May 2018 05:20) (16 - 20)  SpO2: 96% (25 May 2018 05:20) (96% - 98%)    CONSTITUTIONAL:                                                                          WNL  NEURO:                                                                                             WNL                      EYES:                                                                                                  WNL  ENMT:                                                                                                WNL  CV:                                                                                                      WNL  RESPIRATORY:                                                                                  WNL  GI:                                                                                                       WNL  : CASTILLO + / -                                                                                 WNL  MUSKULOSKELETAL:                                                                       WNL  SKIN / BREAST:                                                                                 WNL  Extremities  Vascular                                                          LABS:                        13.8   10.0  )-----------( 236      ( 25 May 2018 07:26 )             41.4     05-25    136  |  96  |  24<H>  ----------------------------<  227<H>  4.3   |  26  |  1.06    Ca    9.2      25 May 2018 07:26  Mg     1.9     05-25    TPro  7.5  /  Alb  4.0  /  TBili  0.7  /  DBili  x   /  AST  26  /  ALT  40  /  AlkPhos  73  05-25    PT/INR - ( 25 May 2018 07:26 )   PT: 11.8 sec;   INR: 1.06          PTT - ( 25 May 2018 07:26 )  PTT:26.9 sec    CARDIAC MARKERS ( 24 May 2018 11:26 )  x     / x     / 558 U/L / x     / 15.1 ng/mL        Hemoglobin A1C, Whole Blood: 8.6 % (05-24 @ 11:26)        RADIOLOGY & ADDITIONAL STUDIES:  CAROTID U/S:    CXR:    CT Scan:    EKG:    TTE / JOHN PAUL:    Cardiac Cath: CT surgery Consult note    Surgeon: Hubert Tolentino    Requesting Physician: Justen    HISTORY OF PRESENT ILLNESS:  62y Male with PMHx of HTN, HLD, hyperkalemia hx, chronic systolic CHF (25-30%), CAD s/p PCI most recently in 2013 @[facility], DM2 with peripheral neuropathy, and depression who presented to his cardiologist for pre-operative clearance for right total knee replacement scheduled with Dr. Estes on 6/4/18. He reports limited exercise tolerance due to knee pain. He reports cycling and walking up ramps without chest pain or SOB.  EKG shows TWI as per MD note. NST from 8/17/17 showed abnormal myocardial perfusion images consistent with scarring in the anterior and anterolateral walls as well as mild stress-induced ischemia at the margins of the defect, overall LV systolic function reduced with global hypokinesis and a decreased ejection fraction from an echo 11/27/10.     Cardiac Cath @[facility] 5/24: 99% ostial LAD, 100% D1 with in-stent re-stenosis and L->L collaterals, 30% OM 1, 75% dRCA, EF 20% (via R radial).         PAST MEDICAL & SURGICAL HISTORY:  Hyperkalemia  HLD (hyperlipidemia)  Chronic systolic CHF (congestive heart failure)  Osteoarthritis  HTN (hypertension)  Type 2 diabetes mellitus with neurological manifestations: Diabetic neuropathy with neurologic complication  Type 2 diabetes mellitus: DM (diabetes mellitus), type 2  History of surgery to heart and great vessels, presenting hazards to health: x 4 in 2005  Atherosclerosis of coronary artery: CAD (coronary artery disease)  Status post percutaneous transluminal coronary angioplasty: in 2012  History of surgery to major organs, presenting hazards to health: x 2  Other postprocedural status: Fixation hardware in foot      MEDICATIONS  (STANDING):  aspirin enteric coated 81 milliGRAM(s) Oral daily  atorvastatin 40 milliGRAM(s) Oral at bedtimed  DULoxetine 90 milliGRAM(s) Oral daily  furosemide   Injectable 40 milliGRAM(s) IV Push every 12 hours  hydrALAZINE 50 milliGRAM(s) Oral two times a day  insulin lispro (HumaLOG) corrective regimen sliding scale   SubCutaneous Before meals and at bedtime  metoprolol succinate ER 50 milliGRAM(s) Oral daily  omega-3-Acid Ethyl Esters 2 Gram(s) Oral daily      Allergies  ACE inhibitors (Hives)  enalapril (Hives)        SOCIAL HISTORY:  Smoker:  YES / NO        PACK YEARS:                         WHEN QUIT?  ETOH use:  YES / NO               FREQUENCY / QUANTITY:  Ilicit Drug use:  YES / NO  Occupation:  Assisted device use (Cane / Walker):  Live with:    FAMILY HISTORY:      Review of Systems  CONSTITUTIONAL:  Fevers / chills, sweats, fatigue, weight loss, weight gain                                    NEGATIVE  NEURO:  parathesias, seizures, syncope, confusion                                                                               NEGATIVE  EYES:  Blurry vision, discharge, pain, loss of vision                                                                                  NEGATIVE  ENMT:  Difficulty hearing, vertigo, dysphagia, epistaxis, recent dental work                                     NEGATIVE  CV:  Chest pain, palpitations, JUNG, orthopnea                                                                                         NEGATIVE  RESPIRATORY:  Wheezing, SOB, cough / sputum, hemoptysis                                                              NEGATIVE  GI:  Nausea, vommiting, diarrhea, constipation, melena                                                                        NEGATIVE  : Hematuria, dysuria, urgency, incontinence                                                                                       NEGATIVE  MUSKULOSKELETAL:  arthritis, joint swelling, muscle weakness                                                           NEGATIVE  SKIN/BREAST:  rash, itching, farhat loss, masses                                                                                            NEGATIVE  PSYCH:  depresion, anxiety, suicidal ideation                                                                                             NEGATIVE  HEME/LYMPH:  bruises easily, enlarged lymph nodes, tender lymph nodes                                        NEGATIVE  ENDOCRINE:  cold intolerance, heat intolerance, polydipsia                                                                   NEGATIVE    PHYSICAL EXAM  Vital Signs Last 24 Hrs  T(C): 36.6 (25 May 2018 05:12), Max: 37.2 (25 May 2018 00:08)  T(F): 97.9 (25 May 2018 05:12), Max: 98.9 (25 May 2018 00:08)  HR: 80 (25 May 2018 05:20) (62 - 80)  BP: 136/74 (25 May 2018 05:20) (119/57 - 157/65)  BP(mean): --  RR: 18 (25 May 2018 05:20) (16 - 20)  SpO2: 96% (25 May 2018 05:20) (96% - 98%)    CONSTITUTIONAL:                                                                          WNL  NEURO:                                                                                             WNL                      EYES:                                                                                                  WNL  ENMT:                                                                                                WNL  CV:                                                                                                      WNL  RESPIRATORY:                                                                                  WNL  GI:                                                                                                       WNL  : CASTILLO + / -                                                                                 WNL  MUSKULOSKELETAL:                                                                       WNL  SKIN / BREAST:                                                                                 WNL  Extremities  Vascular                                                          LABS:                        13.8   10.0  )-----------( 236      ( 25 May 2018 07:26 )             41.4     05-25    136  |  96  |  24<H>  ----------------------------<  227<H>  4.3   |  26  |  1.06    Ca    9.2      25 May 2018 07:26  Mg     1.9     05-25      TPro  7.5  /  Alb  4.0  /  TBili  0.7  /  DBili  x   /  AST  26  /  ALT  40  /  AlkPhos  73  05-25    PT/INR - ( 25 May 2018 07:26 )   PT: 11.8 sec;   INR: 1.06          PTT - ( 25 May 2018 07:26 )  PTT:26.9 sec    CARDIAC MARKERS ( 24 May 2018 11:26 )  x     / x     / 558 U/L / x     / 15.1 ng/mL        Hemoglobin A1C, Whole Blood: 8.6 % (05-24 @ 11:26)        RADIOLOGY & ADDITIONAL STUDIES:  CAROTID U/S:    CXR: 5/23 clear    EKG: < from: 12 Lead ECG (05.24.18 @ 11:12) >  Diagnosis Line Normal sinus rhythm  Left Anterior Fasicular Block  ST - T  abnormalities  Intra-ventricular conduction defect (unspecified)    < end of copied text >    TTE: PENDING    Cardiac Cath @[facility] 5/24: 99% ostial LAD, 100% D1 with in-stent re-stenosis and L->L collaterals, 30% OM 1, 75% dRCA, EF 20% (via R radial). CT surgery Consult note    Surgeon: Hubert Tolentino    Requesting Physician: Justen    HISTORY OF PRESENT ILLNESS:  62y Male with PMHx of HTN, HLD, hyperkalemia hx, chronic systolic CHF (25-30%), CAD s/p PCI most recently in 2013 @[facility], DM2 with peripheral neuropathy, and depression who presented to his cardiologist for pre-operative clearance for right total knee replacement scheduled with Dr. Estes on 6/4/18. He reports limited exercise tolerance due to knee pain. He reports cycling and walking up ramps without chest pain or SOB.  EKG shows TWI as per MD note. NST from 8/17/17 showed abnormal myocardial perfusion images consistent with scarring in the anterior and anterolateral walls as well as mild stress-induced ischemia at the margins of the defect, overall LV systolic function reduced with global hypokinesis and a decreased ejection fraction from an echo 11/27/10.       Cardiac Cath @[facility] 5/24: 99% ostial LAD, 100% D1 with in-stent re-stenosis and L->L collaterals, 30% OM 1, 75% dRCA, EF 20% (via R radial).     TTE: 5/25: mild to moderate LVH, mod to severe global hypokinesis, LVEF 30%, no LV thrombus seen. RV normal in size and function. LA size is normal. RA size is normal. No evidence for any hemodynamically signficant valvular disease. No pericardial effusion.        PAST MEDICAL & SURGICAL HISTORY:  Hyperkalemia  HLD (hyperlipidemia)  Chronic systolic CHF (congestive heart failure)  Osteoarthritis  HTN (hypertension)  Type 2 diabetes mellitus  Diabetic neuropathy   CAD    Status post percutaneous transluminal coronary angioplasty: in 2012  History of surgery Right should rotator cuff  Bilateral knee arthroscopies  Other postprocedural status: Fixation hardware in foot      MEDICATIONS  (STANDING):  aspirin enteric coated 81 milliGRAM(s) Oral daily  atorvastatin 40 milliGRAM(s) Oral at bedtimed  DULoxetine 90 milliGRAM(s) Oral daily  furosemide   Injectable 40 milliGRAM(s) IV Push every 12 hours  hydrALAZINE 50 milliGRAM(s) Oral two times a day  insulin lispro (HumaLOG) corrective regimen sliding scale   SubCutaneous Before meals and at bedtime  metoprolol succinate ER 50 milliGRAM(s) Oral daily  omega-3-Acid Ethyl Esters 2 Gram(s) Oral daily      Allergies  ACE inhibitors (Hives)  enalapril (Hives)        SOCIAL HISTORY:  Smoker:  No  ETOH use: social  Ilicit Drug use:  No=  Live in  with family, independent ADLs      Review of Systems  CONSTITUTIONAL:  Denies Fevers / chills, sweats, fatigue, weight loss, weight gain                                      NEURO:  + parasthesias. Deniesseizures, syncope, confusion                                                                                EYES:  Denies blurry vision, discharge, pain, loss of vision                                                                                    ENMT:  Denies difficulty hearing, vertigo, dysphagia, epistaxis, recent dental work                                 CV:  Denies Chest pain, palpitations, JUNG, orthopnea                                                                                          RESPIRATORY:  Denies Wheezing, SOB, cough / sputum, hemoptysis                                                               GI: Denies Nausea, vomiting diarrhea, constipation, melena                                                                          : Denies Hematuria, dysuria, urgency, incontinence                                                                                         MUSCULOSKELETAL: Right knee pain, denies joint swelling, muscle weakness                                                          SKIN/BREAST:  Denies rash, itching, hair loss, masses                                                                                              PSYCH:  Denies depression anxiety, suicidal ideation                                                                                              HEME/LYMPH:  Denies bruising easily, enlarged lymph nodes, tender lymph nodes                                         ENDOCRINE:  Denies cold intolerance, heat intolerance, polydipsia                                                                      PHYSICAL EXAM  Vital Signs Last 24 Hrs  T(C): 36.6 (25 May 2018 05:12), Max: 37.2 (25 May 2018 00:08)  T(F): 97.9 (25 May 2018 05:12), Max: 98.9 (25 May 2018 00:08)  HR: 80 (25 May 2018 05:20) (62 - 80) SR  BP: 136/74 (25 May 2018 05:20) (119/57 - 157/65)  BP(mean): --  RR: 18 (25 May 2018 05:20) (16 - 20)  SpO2: 96% (25 May 2018 05:20) (96% - 98%) RA    CONSTITUTIONAL:                                                                           NEURO: Alert and oriented x 3, non focal                                                                                                                 EYES:  EOMI, PERRL                                                                                                 ENMT:  Hearing grossly intact, orpharynx benign. Neck supple, no JVD                                                                                                CV: RRR, no m/r/g                                                                                                       RESPIRATORY: CTABL                                                                                  GI: +BS, soft non tender                                                                                                                                                                                        MUSCULOSKELETAL: strength 5/5 and equal in bilateral upper and lower extremities                                                                         SKIN / BREAST: no gross lesions or rashes                                                                                 Extremities: warm no edema.   Vascular: 2+ carotids, femoral 2+, radial 2+, DP 1 + bilaterally                                                          LABS:                        13.8   10.0  )-----------( 236      ( 25 May 2018 07:26 )             41.4     05-25    136  |  96  |  24<H>  ----------------------------<  227<H>  4.3   |  26  |  1.06    Ca    9.2      25 May 2018 07:26  Mg     1.9     05-25      TPro  7.5  /  Alb  4.0  /  TBili  0.7  /  DBili  x   /  AST  26  /  ALT  40  /  AlkPhos  73  05-25    PT/INR - ( 25 May 2018 07:26 )   PT: 11.8 sec;   INR: 1.06          PTT - ( 25 May 2018 07:26 )  PTT:26.9 sec    CARDIAC MARKERS ( 24 May 2018 11:26 )  x     / x     / 558 U/L / x     / 15.1 ng/mL        Hemoglobin A1C, Whole Blood: 8.6 % (05-24 @ 11:26)        RADIOLOGY & ADDITIONAL STUDIES:    CAROTID U/S: pending    CXR: 5/23 clear    EKG: < from: 12 Lead ECG (05.24.18 @ 11:12) >  Diagnosis Line Normal sinus rhythm  Left Anterior Fasicular Block  ST - T  abnormalities  Intra-ventricular conduction defect (unspecified)    < end of copied text >    TTE: 5/25: mild to moderate LVH, mod to severe global hypokinesis, LVEF 30%, no LV thrombus seen. RV normal in size and function. LA size is normal. RA size is normal. No evidence for any hemodynamically signficant valvular disease. No pericardial effusion.      Cardiac Cath @[facility] 5/24: 99% ostial LAD, 100% D1 with in-stent re-stenosis and L->L collaterals, 30% OM 1, 75% dRCA, EF 20% (via R radial).

## 2018-05-25 NOTE — DISCHARGE NOTE ADULT - HOSPITAL COURSE
61 y/o M with PMHx of HTN, HLD, hyperkalemia hx, chronic systolic CHF (25-30%), CAD s/p PCI most recently in 2013 @[facility], IDDM2 (on insulin pump) with peripheral neuropathy, and depression who presented to his cardiologist for pre-operative clearance for right total knee replacement scheduled with Dr. Estes on 6/4/18. He reports limited exercise tolerance due to knee pain. He reports cycling and walking up ramps without chest pain or SOB.  EKG shows TWI as per MD note. NST from 8/17/17 showed abnormal myocardial perfusion images consistent with scarring in the anterior and anterolateral walls as well as mild stress-induced ischemia at the margins of the defect, overall LV systolic function reduced with global hypokinesis with a decreased ejection fraction from an echo 11/27/10. ECHO 11/20/17 showed apical hypokinesis with EF 25-30%. Pt reports he remains asymptomatic. In light of patient’s risk factors, abnormal NST, reduced EF, patient is recommended for cardiac catheterization with possible intervention if clinically indicated to obtain pre-operative clearance for scheduled right TKR.  Pt now cadiac CATH on 5/24/18 revealing 99% oLAD lesion, 100% D1 ISR with L-L collaterals, 30% OM1 lesion, 75% dRCA lesion, EF:20%, EDP 25mmHg. Right radial removed without complications. Recommended to admit for IV diuresis, given Lasix 40mg IV x 1 dose post procedure, continue Lasix 40mg IV x 12hrs. Pt had Nuclear cardiac viability study on 5/25/18 prior to discharge. Echo 5/25/18 Mild to moderate concentric LVH. Mod to severe global hypokinesis. EF 30%. LA/RA/RV normal. No valve disease. Pt will follow up with Dr. Tolentino on Tuesday 5/29/18 at 130pm. Pt cannot tolerate ACE/ARB 2/2 h/o hyperkalemia. Pt is on potassium lowering agents as outpt. Pt's insulin pump fell out on 5/25/18 AM and medium insulin sliding scale initiated.  Pt will be discharged with Lasix 40mg PO daily. 61 y/o M with PMHx of HTN, HLD, hyperkalemia hx, chronic systolic CHF (25-30%), CAD s/p PCI most recently in 2013 @[facility], IDDM2 (on insulin pump) with peripheral neuropathy, and depression who presented to his cardiologist for pre-operative clearance for right total knee replacement scheduled with Dr. Estes on 6/4/18. He reports limited exercise tolerance due to knee pain. He reports cycling and walking up ramps without chest pain or SOB.  EKG shows TWI as per MD note. NST from 8/17/17 showed abnormal myocardial perfusion images consistent with scarring in the anterior and anterolateral walls as well as mild stress-induced ischemia at the margins of the defect, overall LV systolic function reduced with global hypokinesis with a decreased ejection fraction from an echo 11/27/10. ECHO 11/20/17 showed apical hypokinesis with EF 25-30%. Pt reports he remains asymptomatic. In light of patient’s risk factors, abnormal NST, reduced EF, patient is recommended for cardiac catheterization with possible intervention if clinically indicated to obtain pre-operative clearance for scheduled right TKR.  Pt now cadiac CATH on 5/24/18 revealing 99% oLAD lesion, 100% D1 ISR with L-L collaterals, 30% OM1 lesion, 75% dRCA lesion, EF:20%, EDP 25mmHg. Right radial removed without complications. Recommended to admit for IV diuresis, given Lasix 40mg IV x 1 dose post procedure, continue Lasix 40mg IV x 12hrs. Pt had Nuclear cardiac viability study on 5/25/18 prior to discharge. Echo 5/25/18 Mild to moderate concentric LVH. Mod to severe global hypokinesis. EF 30%. LA/RA/RV normal. No valve disease. Pt will follow up with Dr. Tolentino on Tuesday 5/29/18 at 130pm. Pt cannot tolerate ACE/ARB 2/2 h/o hyperkalemia. Pt is on potassium lowering agents as outpt. Pt's insulin pump fell out on 5/25/18 AM and medium insulin sliding scale initiated.  Pt will be discharged with Lasix 40mg PO daily. Pt stable for discharge as per Dr. Arriaga. 61 y/o M with PMHx of HTN, HLD, hyperkalemia hx, chronic systolic CHF (25-30%), CAD s/p PCI most recently in 2013 @[facility], IDDM2 (on insulin pump) with peripheral neuropathy, and depression who presented to his cardiologist for pre-operative clearance for right total knee replacement scheduled with Dr. Estes on 6/4/18. He reports limited exercise tolerance due to knee pain. He reports cycling and walking up ramps without chest pain or SOB.  EKG shows TWI as per MD note. NST from 8/17/17 showed abnormal myocardial perfusion images consistent with scarring in the anterior and anterolateral walls as well as mild stress-induced ischemia at the margins of the defect, overall LV systolic function reduced with global hypokinesis with a decreased ejection fraction from an echo 11/27/10. ECHO 11/20/17 showed apical hypokinesis with EF 25-30%. Pt reports he remains asymptomatic. In light of patient’s risk factors, abnormal NST, reduced EF, patient is recommended for cardiac catheterization with possible intervention if clinically indicated to obtain pre-operative clearance for scheduled right TKR.  Pt now cadiac CATH on 5/24/18 revealing 99% oLAD lesion, 100% D1 ISR with L-L collaterals, 30% OM1 lesion, 75% dRCA lesion, EF:20%, EDP 25mmHg. Right radial removed without complications. Recommended to admit for IV diuresis, given Lasix 40mg IV x 1 dose post procedure, continue Lasix 40mg IV x 12hrs. Pt had Nuclear cardiac viability study on 5/25/18 prior to discharge. Echo 5/25/18 Mild to moderate concentric LVH. Mod to severe global hypokinesis. EF 30%. LA/RA/RV normal. No valve disease. Pt will follow up with Dr. Tolentino on Tuesday 5/29/18 at 130pm. Pt cannot tolerate ACE/ARB 2/2 h/o hyperkalemia. Pt is on potassium lowering agents as outpt. Pt's insulin pump fell out on 5/25/18 AM and medium insulin sliding scale initiated. Cardiac viability 5/25/18 shows fixed perfusion defect involving anterior wall and extending into anterolateral segments, consistent with a scar. Rest of the myocardium is viable. Moderately dilated left ventricle with mild to moderate global hypokinesis. Left ventricular ejection fraction of 40%. Pt will be discharged with Lasix 40mg PO daily. Pt stable for discharge as per Dr. Arriaga.

## 2018-05-26 PROBLEM — M19.90 UNSPECIFIED OSTEOARTHRITIS, UNSPECIFIED SITE: Chronic | Status: ACTIVE | Noted: 2018-05-23

## 2018-05-26 PROBLEM — E78.5 HYPERLIPIDEMIA, UNSPECIFIED: Chronic | Status: ACTIVE | Noted: 2018-05-23

## 2018-05-26 LAB
CULTURE RESULTS: NO GROWTH — SIGNIFICANT CHANGE UP
SPECIMEN SOURCE: SIGNIFICANT CHANGE UP

## 2018-05-29 ENCOUNTER — OUTPATIENT (OUTPATIENT)
Dept: OUTPATIENT SERVICES | Facility: HOSPITAL | Age: 63
LOS: 1 days | End: 2018-05-29
Payer: COMMERCIAL

## 2018-05-29 ENCOUNTER — APPOINTMENT (OUTPATIENT)
Dept: CARDIOTHORACIC SURGERY | Facility: CLINIC | Age: 63
End: 2018-05-29
Payer: COMMERCIAL

## 2018-05-29 VITALS
OXYGEN SATURATION: 95 % | TEMPERATURE: 98.3 F | WEIGHT: 258 LBS | BODY MASS INDEX: 34.19 KG/M2 | HEART RATE: 65 BPM | DIASTOLIC BLOOD PRESSURE: 60 MMHG | HEIGHT: 73 IN | RESPIRATION RATE: 18 BRPM | SYSTOLIC BLOOD PRESSURE: 120 MMHG

## 2018-05-29 DIAGNOSIS — Z87.19 PERSONAL HISTORY OF OTHER DISEASES OF THE DIGESTIVE SYSTEM: ICD-10-CM

## 2018-05-29 DIAGNOSIS — Z78.9 OTHER SPECIFIED HEALTH STATUS: ICD-10-CM

## 2018-05-29 DIAGNOSIS — Z82.49 FAMILY HISTORY OF ISCHEMIC HEART DISEASE AND OTHER DISEASES OF THE CIRCULATORY SYSTEM: ICD-10-CM

## 2018-05-29 DIAGNOSIS — Z95.5 PRESENCE OF CORONARY ANGIOPLASTY IMPLANT AND GRAFT: ICD-10-CM

## 2018-05-29 DIAGNOSIS — Z86.39 PERSONAL HISTORY OF OTHER ENDOCRINE, NUTRITIONAL AND METABOLIC DISEASE: ICD-10-CM

## 2018-05-29 LAB
ALBUMIN SERPL ELPH-MCNC: 4.2 G/DL — SIGNIFICANT CHANGE UP (ref 3.3–5)
ALP SERPL-CCNC: 85 U/L — SIGNIFICANT CHANGE UP (ref 40–120)
ALT FLD-CCNC: 58 U/L — HIGH (ref 10–45)
ANION GAP SERPL CALC-SCNC: 12 MMOL/L — SIGNIFICANT CHANGE UP (ref 5–17)
APPEARANCE UR: CLEAR — SIGNIFICANT CHANGE UP
APTT BLD: 23.9 SEC — LOW (ref 27.5–37.4)
AST SERPL-CCNC: 31 U/L — SIGNIFICANT CHANGE UP (ref 10–40)
BASOPHILS NFR BLD AUTO: 0.6 % — SIGNIFICANT CHANGE UP (ref 0–2)
BILIRUB SERPL-MCNC: 0.5 MG/DL — SIGNIFICANT CHANGE UP (ref 0.2–1.2)
BILIRUB UR-MCNC: NEGATIVE — SIGNIFICANT CHANGE UP
BUN SERPL-MCNC: 30 MG/DL — HIGH (ref 7–23)
CALCIUM SERPL-MCNC: 9.5 MG/DL — SIGNIFICANT CHANGE UP (ref 8.4–10.5)
CHLORIDE SERPL-SCNC: 93 MMOL/L — LOW (ref 96–108)
CHOLEST SERPL-MCNC: 127 MG/DL — SIGNIFICANT CHANGE UP (ref 10–199)
CO2 SERPL-SCNC: 27 MMOL/L — SIGNIFICANT CHANGE UP (ref 22–31)
COLOR SPEC: YELLOW — SIGNIFICANT CHANGE UP
CREAT SERPL-MCNC: 1.15 MG/DL — SIGNIFICANT CHANGE UP (ref 0.5–1.3)
DIFF PNL FLD: NEGATIVE — SIGNIFICANT CHANGE UP
EOSINOPHIL NFR BLD AUTO: 3.3 % — SIGNIFICANT CHANGE UP (ref 0–6)
GLUCOSE SERPL-MCNC: 193 MG/DL — HIGH (ref 70–99)
GLUCOSE UR QL: NEGATIVE — SIGNIFICANT CHANGE UP
HBA1C BLD-MCNC: 8.5 % — HIGH (ref 4–5.6)
HBV SURFACE AG SER-ACNC: SIGNIFICANT CHANGE UP
HCT VFR BLD CALC: 40.1 % — SIGNIFICANT CHANGE UP (ref 39–50)
HDLC SERPL-MCNC: 44 MG/DL — SIGNIFICANT CHANGE UP (ref 40–125)
HGB BLD-MCNC: 12.9 G/DL — LOW (ref 13–17)
INR BLD: 1.01 — SIGNIFICANT CHANGE UP (ref 0.88–1.16)
KETONES UR-MCNC: (no result) MG/DL
LEUKOCYTE ESTERASE UR-ACNC: NEGATIVE — SIGNIFICANT CHANGE UP
LIPID PNL WITH DIRECT LDL SERPL: 64 MG/DL — SIGNIFICANT CHANGE UP
LYMPHOCYTES # BLD AUTO: 17.3 % — SIGNIFICANT CHANGE UP (ref 13–44)
MCHC RBC-ENTMCNC: 24.8 PG — LOW (ref 27–34)
MCHC RBC-ENTMCNC: 32.2 G/DL — SIGNIFICANT CHANGE UP (ref 32–36)
MCV RBC AUTO: 77 FL — LOW (ref 80–100)
MONOCYTES NFR BLD AUTO: 10.9 % — SIGNIFICANT CHANGE UP (ref 2–14)
NEUTROPHILS NFR BLD AUTO: 67.9 % — SIGNIFICANT CHANGE UP (ref 43–77)
NITRITE UR-MCNC: NEGATIVE — SIGNIFICANT CHANGE UP
PH UR: 5.5 — SIGNIFICANT CHANGE UP (ref 5–8)
PLATELET # BLD AUTO: 266 K/UL — SIGNIFICANT CHANGE UP (ref 150–400)
POTASSIUM SERPL-MCNC: 5.2 MMOL/L — SIGNIFICANT CHANGE UP (ref 3.5–5.3)
POTASSIUM SERPL-SCNC: 5.2 MMOL/L — SIGNIFICANT CHANGE UP (ref 3.5–5.3)
PROT SERPL-MCNC: 7.7 G/DL — SIGNIFICANT CHANGE UP (ref 6–8.3)
PROT UR-MCNC: NEGATIVE MG/DL — SIGNIFICANT CHANGE UP
PROTHROM AB SERPL-ACNC: 11.2 SEC — SIGNIFICANT CHANGE UP (ref 9.8–12.7)
RBC # BLD: 5.21 M/UL — SIGNIFICANT CHANGE UP (ref 4.2–5.8)
RBC # FLD: 18.4 % — HIGH (ref 10.3–16.9)
SODIUM SERPL-SCNC: 132 MMOL/L — LOW (ref 135–145)
SP GR SPEC: 1.02 — SIGNIFICANT CHANGE UP (ref 1–1.03)
TOTAL CHOLESTEROL/HDL RATIO MEASUREMENT: 2.9 RATIO — LOW (ref 3.4–9.6)
TRIGL SERPL-MCNC: 95 MG/DL — SIGNIFICANT CHANGE UP (ref 10–149)
TSH SERPL-MCNC: 2.01 UIU/ML — SIGNIFICANT CHANGE UP (ref 0.35–4.94)
UROBILINOGEN FLD QL: 0.2 E.U./DL — SIGNIFICANT CHANGE UP
WBC # BLD: 9.9 K/UL — SIGNIFICANT CHANGE UP (ref 3.8–10.5)
WBC # FLD AUTO: 9.9 K/UL — SIGNIFICANT CHANGE UP (ref 3.8–10.5)

## 2018-05-29 PROCEDURE — 99213 OFFICE O/P EST LOW 20 MIN: CPT

## 2018-05-29 RX ORDER — HYDRALAZINE HYDROCHLORIDE 50 MG/1
50 TABLET ORAL TWICE DAILY
Refills: 0 | Status: DISCONTINUED | COMMUNITY
Start: 2017-11-16 | End: 2018-05-29

## 2018-05-30 ENCOUNTER — APPOINTMENT (OUTPATIENT)
Dept: INTERNAL MEDICINE | Facility: CLINIC | Age: 63
End: 2018-05-30

## 2018-05-30 VITALS
RESPIRATION RATE: 18 BRPM | SYSTOLIC BLOOD PRESSURE: 120 MMHG | OXYGEN SATURATION: 95 % | HEIGHT: 73 IN | HEART RATE: 65 BPM | TEMPERATURE: 98 F | DIASTOLIC BLOOD PRESSURE: 60 MMHG | WEIGHT: 258.38 LBS

## 2018-05-30 DIAGNOSIS — I50.22 CHRONIC SYSTOLIC (CONGESTIVE) HEART FAILURE: ICD-10-CM

## 2018-05-30 DIAGNOSIS — I25.10 ATHEROSCLEROTIC HEART DISEASE OF NATIVE CORONARY ARTERY WITHOUT ANGINA PECTORIS: ICD-10-CM

## 2018-05-30 DIAGNOSIS — E11.40 TYPE 2 DIABETES MELLITUS WITH DIABETIC NEUROPATHY, UNSPECIFIED: ICD-10-CM

## 2018-05-30 DIAGNOSIS — I11.0 HYPERTENSIVE HEART DISEASE WITH HEART FAILURE: ICD-10-CM

## 2018-05-30 DIAGNOSIS — M17.11 UNILATERAL PRIMARY OSTEOARTHRITIS, RIGHT KNEE: ICD-10-CM

## 2018-05-30 DIAGNOSIS — T82.855A STENOSIS OF CORONARY ARTERY STENT, INITIAL ENCOUNTER: ICD-10-CM

## 2018-05-30 PROBLEM — Z95.5 H/O HEART ARTERY STENT: Status: RESOLVED | Noted: 2018-05-30 | Resolved: 2018-05-30

## 2018-05-30 PROBLEM — Z87.19 HISTORY OF DIVERTICULITIS OF COLON: Status: RESOLVED | Noted: 2018-05-30 | Resolved: 2018-05-30

## 2018-05-30 PROBLEM — Z78.9 FAMILY HISTORY NOT KNOWN DUE TO ADOPTION: Status: ACTIVE | Noted: 2018-05-30

## 2018-05-30 PROBLEM — Z86.39 HISTORY OF HYPERLIPIDEMIA: Status: RESOLVED | Noted: 2018-05-30 | Resolved: 2018-05-30

## 2018-05-30 PROBLEM — Z82.49 FAMILY HISTORY OF CARDIAC DISORDER: Status: ACTIVE | Noted: 2018-05-30

## 2018-05-30 NOTE — H&P ADULT - HISTORY OF PRESENT ILLNESS
63 y/o M with PMHx of HTN, HLD, hyperkalemia, chronic systolic CHF (25-30%), CAD s/p PCI most recently in 2013 @[facility], IDDM2 (on insulin pump) with peripheral neuropathy, and depression who presented to his cardiologist for pre-operative clearance for right total knee replacement scheduled with Dr. Estes on 6/4/18. He reports limited exercise tolerance due to knee pain. NST 8/17/17 showed abnormal myocardial perfusion images consistent with scarring in the anterior and anterolateral walls as well as mild stress-induced ischemia at the margins of the defect, overall LV systolic function reduced with global hypokinesis with a decreased ejection fraction.  ECHO 11/20/17 showed apical hypokinesis with EF 25-30%. Pt reports he remains asymptomatic. Cardiac Cath  5/24/18 revealing 99% oLAD lesion, 100% D1 ISR with L-L collaterals, 30% OM1 lesion, 75% dRCA lesion, EF:20%, EDP 25mmHg. Nuclear cardiac viability study on 5/25/18 revealed fixed perfusion defect involving anterior wall and extending into anterolateral segments, consistent with a scar. Rest of the myocardium is viable.   Echo 5/25/18 revealed mild to moderate concentric LVH,  Mod to severe global hypokinesis. EF 30%. LA/RA/RV normal. Patient was seen in the outpatient office by Dr Grace and now presents for elective surgery. 61 y/o M with PMHx of HTN, HLD, hyperkalemia, chronic systolic CHF (25-30%), CAD s/p PCI most recently in 2013 @[facility], IDDM2 (on insulin pump) with peripheral neuropathy, and depression who presented to his cardiologist for pre-operative clearance for right total knee replacement scheduled with Dr. Estes on 6/4/18. He reports limited exercise tolerance due to knee pain. NST 8/17/17 showed abnormal myocardial perfusion images consistent with scarring in the anterior and anterolateral walls as well as mild stress-induced ischemia at the margins of the defect, overall LV systolic function reduced with global hypokinesis with a decreased ejection fraction.  ECHO 11/20/17 showed apical hypokinesis with EF 25-30%. Pt reports he remains asymptomatic. Cardiac Cath  5/24/18 revealing 99% oLAD lesion, 100% D1 ISR with L-L collaterals, 30% OM1 lesion, 75% dRCA lesion, EF:20%, EDP 25mmHg. Nuclear cardiac viability study on 5/25/18 revealed fixed perfusion defect involving anterior wall and extending into anterolateral segments, consistent with a scar. Rest of the myocardium is viable.   Echo 5/25/18 revealed mild to moderate concentric LVH,  Mod to severe global hypokinesis. EF 30%. LA/RA/RV normal. Patient was seen in the outpatient office by Dr Grace and now presents for elective surgery.     Patient seen in same day holding area; Reports no changes to PMHx or medications since last seen by our team. Denies acute or current SOB, chest pain, palpitation, N/V/D, fever/chills, recent illness, or any other concerning symptoms.

## 2018-05-30 NOTE — H&P ADULT - PMH
Atherosclerosis of coronary artery  CAD (coronary artery disease)  Chronic systolic CHF (congestive heart failure)    CKD (chronic kidney disease) stage 2, GFR 60-89 ml/min    COPD, moderate    History of surgery to heart and great vessels, presenting hazards to health  x 4 in 2005  HLD (hyperlipidemia)    HTN (hypertension)    Hyperkalemia    Osteoarthritis    Status post percutaneous transluminal coronary angioplasty  in 2012  Type 2 diabetes mellitus  DM (diabetes mellitus), type 2  Type 2 diabetes mellitus with neurological manifestations  Diabetic neuropathy with neurologic complication

## 2018-05-30 NOTE — H&P ADULT - EXTREMITIES COMMENTS
bilateral lower extremity deep varicose veins  partial amputations of right 1st toe and left 2nd/3rd toe

## 2018-05-30 NOTE — H&P ADULT - NSHPLABSRESULTS_GEN_ALL_CORE
Hgb A1C =  creat =  hct=  hgb=  plt=  WBC=  INR=  tot alb=  tot bili=    TSH=    Chest xray:     EKG:    Carotid US:    PFT's:    Echo:    Cardiac Cath  5/24/18 revealing 99% oLAD lesion, 100% D1 ISR with L-L collaterals, 30% OM1 lesion, 75% dRCA lesion, EF:20%, EDP 25mmHg.    Cardiac viability 5/25/18 shows fixed perfusion defect involving anterior wall and extending into anterolateral segments, consistent with a scar. Rest of the myocardium is viable. Moderately dilated left ventricle with mild to moderate global hypokinesis. Left ventricular ejection fraction of 40% Hgb A1C = 8.5  creat = 1.15  hct= 40.1  hgb=12.9  vsz=943  WBC=9.9  INR= 1.01  tot alb= 4.2  tot bili= 0.5    TSH= 2.007    5/23/18 Chest xray: lungs clear    5/24/18 EKG: SR, 63bpm, left anterior fasicular block    5/25/18 Carotid US: no hemodynamically significant carotid artery stenosis. Mild calcified plaque is visualized.    5/25/18 PFT's: FEV1 53%    5/25/18 Echo: mild to moderate concentric LV hypertrophy, LVEF 30%, no significant valvular disease    5/24/18 Cardiac Cath: 99% oLAD lesion, 100% D1 ISR with L-L collaterals, 30% OM1 lesion, 75% dRCA lesion, EF:20%, EDP 25mmHg.    5/25/18 Cardiac viability: fixed perfusion defect involving anterior wall and extending into anterolateral segments, consistent with a scar. Rest of the myocardium is viable. Moderately dilated left ventricle with mild to moderate global hypokinesis. Left ventricular ejection fraction of 40%

## 2018-05-30 NOTE — H&P ADULT - ASSESSMENT
with a history of stable angina, positive stress test and [default value]-vessel CAD  presents for surgical consult. Dr. Nuno reviewed the cardiac cath imaging and reports with the patient and  [default value] and discussed the case with . [default value].  Dr. Nuno discussed the risks, benefits and alternatives to surgery. Risks include but not limited to death, heart attack, bleeding, stroke, kidney problems and infection. He quoted a [default value] operative mortality and complication risk.  He also discussed the various approaches, minimally invasive versus sternotomy. Dr. Nuno feels the patient will benefit and is a candidate for a [default value].All questions were addressed and patient agrees to proceed with surgery on 63 yo male with a history of HTN, HLD, DM(insulin pump), peripheral neuropathy, COPD, CKD stage 2, CAD w/multiple PCI's, chronic systolic heart failure with +stress test prior to knee replacement surgery, s/p Cardiac cath that revealed severe 2 vessel CAD with ISR.  Dr. Nuno reviewed the cardiac cath imaging and reports with the patient and his wife and discussed the case with Dr. Tolentnio and Dr. VINICIUS Campuzano.   Dr. Nuno discussed the risks, benefits and alternatives to surgery. Risks include but not limited to death, heart attack, bleeding, stroke, kidney problems and infection. He quoted a 1% operative mortality and complication risk.  He also discussed the various approaches, minimally invasive versus sternotomy. Dr. Nuno feels the patient will benefit and is a candidate for a robotic assisted MIDCAB x 1(LIMA->LAD). Once recovered from MIDCAB will have knee replacement and will have PCI 4-6 weeks post knee surgery. All questions were addressed and patient agrees to proceed with surgery.     Plan:   PST  SDA 6/1  pt instructed to take metoprolol the  morning of surgery  instructions provided re antibacterial showers and pt given 3 sponges  Pt will adjust insulin pump s/t npo status 63 yo male with a history of HTN, HLD, DM(insulin pump), peripheral neuropathy, COPD, CKD stage 2, CAD w/multiple PCI's, chronic systolic heart failure with +stress test prior to knee replacement surgery, s/p Cardiac cath that revealed severe 2 vessel CAD with ISR.  Dr. Nuno reviewed the cardiac cath imaging and reports with the patient and his wife and discussed the case with Dr. Tolentino and Dr. VINICIUS Campuzano.   Dr. Nuno discussed the risks, benefits and alternatives to surgery. Risks include but not limited to death, heart attack, bleeding, stroke, kidney problems and infection. He quoted a 1% operative mortality and complication risk.  He also discussed the various approaches, minimally invasive versus sternotomy. Dr. Nuno feels the patient will benefit and is a candidate for a robotic assisted MIDCAB x 1(LIMA->LAD). Once recovered from MIDCAB will have knee replacement and will have PCI 4-6 weeks post knee surgery. All questions were addressed and patient agrees to proceed with surgery.     Plan:   PST  SDA 6/1  pt instructed to take metoprolol the  morning of surgery  instructions provided re antibacterial showers and pt given 3 sponges  Pt will adjust insulin pump s/t npo status      Addendum on 6/1/18  Admit under Dr. Nuno via same day surgery for MIDCAB today. Consent signed, placed on chart.  Risks/benefits reviewed, patient understands and agrees. T&S ordered and blood products placed on hold for OR.  To 9E post-op.

## 2018-05-31 DIAGNOSIS — Z95.1 PRESENCE OF AORTOCORONARY BYPASS GRAFT: ICD-10-CM

## 2018-05-31 RX ORDER — METOPROLOL TARTRATE 50 MG
1 TABLET ORAL
Qty: 0 | Refills: 0 | COMMUNITY

## 2018-05-31 NOTE — PATIENT PROFILE ADULT. - PMH
Atherosclerosis of coronary artery  CAD (coronary artery disease)  Blood clot due to device, implant, or graft  was on blood thinners  Chronic systolic CHF (congestive heart failure)    CKD (chronic kidney disease) stage 2, GFR 60-89 ml/min    COPD, moderate    History of surgery to heart and great vessels, presenting hazards to health  x 4 in 2005  HLD (hyperlipidemia)    HTN (hypertension)    Hyperkalemia    Osteoarthritis    Status post percutaneous transluminal coronary angioplasty  in 2012  Type 2 diabetes mellitus  DM (diabetes mellitus), type 2  Type 2 diabetes mellitus with neurological manifestations  Diabetic neuropathy with neurologic complication

## 2018-05-31 NOTE — PATIENT PROFILE ADULT. - PSH
History of surgery to major organs, presenting hazards to health  heart stents  Other postprocedural status  Fixation hardware in foot

## 2018-06-01 ENCOUNTER — APPOINTMENT (OUTPATIENT)
Dept: CARDIOTHORACIC SURGERY | Facility: HOSPITAL | Age: 63
End: 2018-06-01

## 2018-06-01 ENCOUNTER — INPATIENT (INPATIENT)
Facility: HOSPITAL | Age: 63
LOS: 3 days | Discharge: HOME HEALTH | DRG: 236 | End: 2018-06-05
Attending: THORACIC SURGERY (CARDIOTHORACIC VASCULAR SURGERY) | Admitting: THORACIC SURGERY (CARDIOTHORACIC VASCULAR SURGERY)
Payer: COMMERCIAL

## 2018-06-01 DIAGNOSIS — Z86.39 PERSONAL HISTORY OF OTHER ENDOCRINE, NUTRITIONAL AND METABOLIC DISEASE: ICD-10-CM

## 2018-06-01 DIAGNOSIS — Z86.79 PERSONAL HISTORY OF OTHER DISEASES OF THE CIRCULATORY SYSTEM: ICD-10-CM

## 2018-06-01 LAB
ALBUMIN SERPL ELPH-MCNC: 3.5 G/DL — SIGNIFICANT CHANGE UP (ref 3.3–5)
ALBUMIN SERPL ELPH-MCNC: 3.7 G/DL — SIGNIFICANT CHANGE UP (ref 3.3–5)
ALP SERPL-CCNC: 66 U/L — SIGNIFICANT CHANGE UP (ref 40–120)
ALP SERPL-CCNC: 69 U/L — SIGNIFICANT CHANGE UP (ref 40–120)
ALT FLD-CCNC: 36 U/L — SIGNIFICANT CHANGE UP (ref 10–45)
ALT FLD-CCNC: 38 U/L — SIGNIFICANT CHANGE UP (ref 10–45)
ANION GAP SERPL CALC-SCNC: 10 MMOL/L — SIGNIFICANT CHANGE UP (ref 5–17)
ANION GAP SERPL CALC-SCNC: 11 MMOL/L — SIGNIFICANT CHANGE UP (ref 5–17)
APTT BLD: 25.2 SEC — LOW (ref 27.5–37.4)
APTT BLD: 28.3 SEC — SIGNIFICANT CHANGE UP (ref 27.5–37.4)
AST SERPL-CCNC: 22 U/L — SIGNIFICANT CHANGE UP (ref 10–40)
AST SERPL-CCNC: 25 U/L — SIGNIFICANT CHANGE UP (ref 10–40)
BASOPHILS NFR BLD AUTO: 0.3 % — SIGNIFICANT CHANGE UP (ref 0–2)
BILIRUB SERPL-MCNC: 0.8 MG/DL — SIGNIFICANT CHANGE UP (ref 0.2–1.2)
BILIRUB SERPL-MCNC: 0.9 MG/DL — SIGNIFICANT CHANGE UP (ref 0.2–1.2)
BUN SERPL-MCNC: 24 MG/DL — HIGH (ref 7–23)
BUN SERPL-MCNC: 26 MG/DL — HIGH (ref 7–23)
CALCIUM SERPL-MCNC: 8.7 MG/DL — SIGNIFICANT CHANGE UP (ref 8.4–10.5)
CALCIUM SERPL-MCNC: 8.9 MG/DL — SIGNIFICANT CHANGE UP (ref 8.4–10.5)
CHLORIDE SERPL-SCNC: 100 MMOL/L — SIGNIFICANT CHANGE UP (ref 96–108)
CHLORIDE SERPL-SCNC: 102 MMOL/L — SIGNIFICANT CHANGE UP (ref 96–108)
CO2 SERPL-SCNC: 25 MMOL/L — SIGNIFICANT CHANGE UP (ref 22–31)
CO2 SERPL-SCNC: 26 MMOL/L — SIGNIFICANT CHANGE UP (ref 22–31)
CREAT SERPL-MCNC: 0.82 MG/DL — SIGNIFICANT CHANGE UP (ref 0.5–1.3)
CREAT SERPL-MCNC: 0.93 MG/DL — SIGNIFICANT CHANGE UP (ref 0.5–1.3)
EOSINOPHIL NFR BLD AUTO: 1.4 % — SIGNIFICANT CHANGE UP (ref 0–6)
GAS PNL BLDA: SIGNIFICANT CHANGE UP
GLUCOSE BLDC GLUCOMTR-MCNC: 189 MG/DL — HIGH (ref 70–99)
GLUCOSE BLDC GLUCOMTR-MCNC: 194 MG/DL — HIGH (ref 70–99)
GLUCOSE BLDC GLUCOMTR-MCNC: 206 MG/DL — HIGH (ref 70–99)
GLUCOSE BLDC GLUCOMTR-MCNC: 216 MG/DL — HIGH (ref 70–99)
GLUCOSE BLDC GLUCOMTR-MCNC: 224 MG/DL — HIGH (ref 70–99)
GLUCOSE BLDC GLUCOMTR-MCNC: 246 MG/DL — HIGH (ref 70–99)
GLUCOSE SERPL-MCNC: 222 MG/DL — HIGH (ref 70–99)
GLUCOSE SERPL-MCNC: 228 MG/DL — HIGH (ref 70–99)
HCT VFR BLD CALC: 37 % — LOW (ref 39–50)
HCT VFR BLD CALC: 38.6 % — LOW (ref 39–50)
HGB BLD-MCNC: 11.8 G/DL — LOW (ref 13–17)
HGB BLD-MCNC: 12.3 G/DL — LOW (ref 13–17)
INR BLD: 1.15 — SIGNIFICANT CHANGE UP (ref 0.88–1.16)
INR BLD: 1.19 — HIGH (ref 0.88–1.16)
LYMPHOCYTES # BLD AUTO: 7.6 % — LOW (ref 13–44)
MAGNESIUM SERPL-MCNC: 1.9 MG/DL — SIGNIFICANT CHANGE UP (ref 1.6–2.6)
MAGNESIUM SERPL-MCNC: 2.3 MG/DL — SIGNIFICANT CHANGE UP (ref 1.6–2.6)
MCHC RBC-ENTMCNC: 24.5 PG — LOW (ref 27–34)
MCHC RBC-ENTMCNC: 24.5 PG — LOW (ref 27–34)
MCHC RBC-ENTMCNC: 31.9 G/DL — LOW (ref 32–36)
MCHC RBC-ENTMCNC: 31.9 G/DL — LOW (ref 32–36)
MCV RBC AUTO: 76.7 FL — LOW (ref 80–100)
MCV RBC AUTO: 76.8 FL — LOW (ref 80–100)
MONOCYTES NFR BLD AUTO: 9.9 % — SIGNIFICANT CHANGE UP (ref 2–14)
NEUTROPHILS NFR BLD AUTO: 80.8 % — HIGH (ref 43–77)
PHOSPHATE SERPL-MCNC: 2.3 MG/DL — LOW (ref 2.5–4.5)
PHOSPHATE SERPL-MCNC: 3 MG/DL — SIGNIFICANT CHANGE UP (ref 2.5–4.5)
PLATELET # BLD AUTO: 204 K/UL — SIGNIFICANT CHANGE UP (ref 150–400)
PLATELET # BLD AUTO: 215 K/UL — SIGNIFICANT CHANGE UP (ref 150–400)
POTASSIUM SERPL-MCNC: 4.4 MMOL/L — SIGNIFICANT CHANGE UP (ref 3.5–5.3)
POTASSIUM SERPL-MCNC: 5 MMOL/L — SIGNIFICANT CHANGE UP (ref 3.5–5.3)
POTASSIUM SERPL-SCNC: 4.4 MMOL/L — SIGNIFICANT CHANGE UP (ref 3.5–5.3)
POTASSIUM SERPL-SCNC: 5 MMOL/L — SIGNIFICANT CHANGE UP (ref 3.5–5.3)
PROT SERPL-MCNC: 6.6 G/DL — SIGNIFICANT CHANGE UP (ref 6–8.3)
PROT SERPL-MCNC: 7 G/DL — SIGNIFICANT CHANGE UP (ref 6–8.3)
PROTHROM AB SERPL-ACNC: 12.8 SEC — HIGH (ref 9.8–12.7)
PROTHROM AB SERPL-ACNC: 13.2 SEC — HIGH (ref 9.8–12.7)
RBC # BLD: 4.82 M/UL — SIGNIFICANT CHANGE UP (ref 4.2–5.8)
RBC # BLD: 5.03 M/UL — SIGNIFICANT CHANGE UP (ref 4.2–5.8)
RBC # FLD: 18.1 % — HIGH (ref 10.3–16.9)
RBC # FLD: 18.1 % — HIGH (ref 10.3–16.9)
SODIUM SERPL-SCNC: 137 MMOL/L — SIGNIFICANT CHANGE UP (ref 135–145)
SODIUM SERPL-SCNC: 137 MMOL/L — SIGNIFICANT CHANGE UP (ref 135–145)
WBC # BLD: 15.1 K/UL — HIGH (ref 3.8–10.5)
WBC # BLD: 15.5 K/UL — HIGH (ref 3.8–10.5)
WBC # FLD AUTO: 15.1 K/UL — HIGH (ref 3.8–10.5)
WBC # FLD AUTO: 15.5 K/UL — HIGH (ref 3.8–10.5)

## 2018-06-01 PROCEDURE — 76998 US GUIDE INTRAOP: CPT | Mod: 26

## 2018-06-01 PROCEDURE — 36415 COLL VENOUS BLD VENIPUNCTURE: CPT

## 2018-06-01 PROCEDURE — 33533 CABG ARTERIAL SINGLE: CPT

## 2018-06-01 PROCEDURE — 86900 BLOOD TYPING SEROLOGIC ABO: CPT

## 2018-06-01 PROCEDURE — 87340 HEPATITIS B SURFACE AG IA: CPT

## 2018-06-01 PROCEDURE — 80061 LIPID PANEL: CPT

## 2018-06-01 PROCEDURE — 85610 PROTHROMBIN TIME: CPT

## 2018-06-01 PROCEDURE — 83036 HEMOGLOBIN GLYCOSYLATED A1C: CPT

## 2018-06-01 PROCEDURE — 86901 BLOOD TYPING SEROLOGIC RH(D): CPT

## 2018-06-01 PROCEDURE — 85025 COMPLETE CBC W/AUTO DIFF WBC: CPT

## 2018-06-01 PROCEDURE — 99291 CRITICAL CARE FIRST HOUR: CPT

## 2018-06-01 PROCEDURE — 81003 URINALYSIS AUTO W/O SCOPE: CPT

## 2018-06-01 PROCEDURE — 80053 COMPREHEN METABOLIC PANEL: CPT

## 2018-06-01 PROCEDURE — 85730 THROMBOPLASTIN TIME PARTIAL: CPT

## 2018-06-01 PROCEDURE — 84443 ASSAY THYROID STIM HORMONE: CPT

## 2018-06-01 PROCEDURE — 86850 RBC ANTIBODY SCREEN: CPT

## 2018-06-01 PROCEDURE — 71045 X-RAY EXAM CHEST 1 VIEW: CPT | Mod: 26

## 2018-06-01 RX ORDER — CHLORHEXIDINE GLUCONATE 213 G/1000ML
5 SOLUTION TOPICAL EVERY 4 HOURS
Qty: 0 | Refills: 0 | Status: DISCONTINUED | OUTPATIENT
Start: 2018-06-01 | End: 2018-06-02

## 2018-06-01 RX ORDER — CLEVIDIPINE BUTYRATE 50MG/100ML
2 VIAL (ML) INTRAVENOUS
Qty: 25 | Refills: 0 | Status: DISCONTINUED | OUTPATIENT
Start: 2018-06-01 | End: 2018-06-02

## 2018-06-01 RX ORDER — ASPIRIN/CALCIUM CARB/MAGNESIUM 324 MG
81 TABLET ORAL DAILY
Qty: 0 | Refills: 0 | Status: DISCONTINUED | OUTPATIENT
Start: 2018-06-01 | End: 2018-06-05

## 2018-06-01 RX ORDER — MEPERIDINE HYDROCHLORIDE 50 MG/ML
25 INJECTION INTRAMUSCULAR; INTRAVENOUS; SUBCUTANEOUS ONCE
Qty: 0 | Refills: 0 | Status: DISCONTINUED | OUTPATIENT
Start: 2018-06-01 | End: 2018-06-01

## 2018-06-01 RX ORDER — INSULIN HUMAN 100 [IU]/ML
1 INJECTION, SOLUTION SUBCUTANEOUS
Qty: 100 | Refills: 0 | Status: DISCONTINUED | OUTPATIENT
Start: 2018-06-01 | End: 2018-06-04

## 2018-06-01 RX ORDER — PANTOPRAZOLE SODIUM 20 MG/1
40 TABLET, DELAYED RELEASE ORAL DAILY
Qty: 0 | Refills: 0 | Status: DISCONTINUED | OUTPATIENT
Start: 2018-06-01 | End: 2018-06-02

## 2018-06-01 RX ORDER — HEPARIN SODIUM 5000 [USP'U]/ML
5000 INJECTION INTRAVENOUS; SUBCUTANEOUS EVERY 8 HOURS
Qty: 0 | Refills: 0 | Status: DISCONTINUED | OUTPATIENT
Start: 2018-06-01 | End: 2018-06-05

## 2018-06-01 RX ORDER — ATORVASTATIN CALCIUM 80 MG/1
40 TABLET, FILM COATED ORAL AT BEDTIME
Qty: 0 | Refills: 0 | Status: DISCONTINUED | OUTPATIENT
Start: 2018-06-01 | End: 2018-06-05

## 2018-06-01 RX ORDER — ACETAMINOPHEN 500 MG
1000 TABLET ORAL ONCE
Qty: 0 | Refills: 0 | Status: COMPLETED | OUTPATIENT
Start: 2018-06-01 | End: 2018-06-01

## 2018-06-01 RX ORDER — FENTANYL CITRATE 50 UG/ML
25 INJECTION INTRAVENOUS
Qty: 0 | Refills: 0 | Status: DISCONTINUED | OUTPATIENT
Start: 2018-06-01 | End: 2018-06-02

## 2018-06-01 RX ORDER — SODIUM CHLORIDE 9 MG/ML
1000 INJECTION INTRAMUSCULAR; INTRAVENOUS; SUBCUTANEOUS
Qty: 0 | Refills: 0 | Status: DISCONTINUED | OUTPATIENT
Start: 2018-06-01 | End: 2018-06-04

## 2018-06-01 RX ORDER — MAGNESIUM SULFATE 500 MG/ML
2 VIAL (ML) INJECTION ONCE
Qty: 0 | Refills: 0 | Status: COMPLETED | OUTPATIENT
Start: 2018-06-01 | End: 2018-06-01

## 2018-06-01 RX ORDER — LIDOCAINE 4 G/100G
1 CREAM TOPICAL DAILY
Qty: 0 | Refills: 0 | Status: DISCONTINUED | OUTPATIENT
Start: 2018-06-01 | End: 2018-06-05

## 2018-06-01 RX ORDER — DEXTROSE 50 % IN WATER 50 %
50 SYRINGE (ML) INTRAVENOUS
Qty: 0 | Refills: 0 | Status: DISCONTINUED | OUTPATIENT
Start: 2018-06-01 | End: 2018-06-05

## 2018-06-01 RX ORDER — DEXMEDETOMIDINE HYDROCHLORIDE IN 0.9% SODIUM CHLORIDE 4 UG/ML
0.1 INJECTION INTRAVENOUS
Qty: 200 | Refills: 0 | Status: DISCONTINUED | OUTPATIENT
Start: 2018-06-01 | End: 2018-06-02

## 2018-06-01 RX ORDER — CLOPIDOGREL BISULFATE 75 MG/1
75 TABLET, FILM COATED ORAL DAILY
Qty: 0 | Refills: 0 | Status: DISCONTINUED | OUTPATIENT
Start: 2018-06-01 | End: 2018-06-05

## 2018-06-01 RX ORDER — DEXTROSE 50 % IN WATER 50 %
25 SYRINGE (ML) INTRAVENOUS
Qty: 0 | Refills: 0 | Status: DISCONTINUED | OUTPATIENT
Start: 2018-06-01 | End: 2018-06-05

## 2018-06-01 RX ORDER — CEFAZOLIN SODIUM 1 G
2000 VIAL (EA) INJECTION EVERY 8 HOURS
Qty: 0 | Refills: 0 | Status: COMPLETED | OUTPATIENT
Start: 2018-06-01 | End: 2018-06-03

## 2018-06-01 RX ORDER — BUPIVACAINE 13.3 MG/ML
20 INJECTION, SUSPENSION, LIPOSOMAL INFILTRATION ONCE
Qty: 0 | Refills: 0 | Status: DISCONTINUED | OUTPATIENT
Start: 2018-06-01 | End: 2018-06-01

## 2018-06-01 RX ADMIN — Medication 50 GRAM(S): at 19:34

## 2018-06-01 RX ADMIN — Medication 1000 MILLIGRAM(S): at 20:15

## 2018-06-01 RX ADMIN — CLOPIDOGREL BISULFATE 75 MILLIGRAM(S): 75 TABLET, FILM COATED ORAL at 22:49

## 2018-06-01 RX ADMIN — CHLORHEXIDINE GLUCONATE 5 MILLILITER(S): 213 SOLUTION TOPICAL at 21:25

## 2018-06-01 RX ADMIN — FENTANYL CITRATE 25 MICROGRAM(S): 50 INJECTION INTRAVENOUS at 19:37

## 2018-06-01 RX ADMIN — Medication 81 MILLIGRAM(S): at 22:49

## 2018-06-01 RX ADMIN — LIDOCAINE 1 PATCH: 4 CREAM TOPICAL at 21:46

## 2018-06-01 RX ADMIN — Medication 100 MILLIGRAM(S): at 21:25

## 2018-06-01 RX ADMIN — CHLORHEXIDINE GLUCONATE 5 MILLILITER(S): 213 SOLUTION TOPICAL at 18:48

## 2018-06-01 RX ADMIN — FENTANYL CITRATE 25 MICROGRAM(S): 50 INJECTION INTRAVENOUS at 22:08

## 2018-06-01 RX ADMIN — Medication 400 MILLIGRAM(S): at 19:33

## 2018-06-01 RX ADMIN — FENTANYL CITRATE 25 MICROGRAM(S): 50 INJECTION INTRAVENOUS at 17:43

## 2018-06-01 RX ADMIN — FENTANYL CITRATE 25 MICROGRAM(S): 50 INJECTION INTRAVENOUS at 22:25

## 2018-06-01 RX ADMIN — ATORVASTATIN CALCIUM 40 MILLIGRAM(S): 80 TABLET, FILM COATED ORAL at 22:50

## 2018-06-01 RX ADMIN — HEPARIN SODIUM 5000 UNIT(S): 5000 INJECTION INTRAVENOUS; SUBCUTANEOUS at 21:25

## 2018-06-01 NOTE — PROGRESS NOTE ADULT - SUBJECTIVE AND OBJECTIVE BOX
61 yo Male with PAST MEDICAL & SURGICAL HISTORY: Blood clot due to device, implant, or graft: was on blood thinners, CKD (chronic kidney disease) stage 2, GFR 60-89 ml/min, COPD, HLD, HFrEF 30%, OA, HTN, DM II with Diabetic neuropathy, CAD s/p PCI 2012 who was being w/up for knee replacement surgery and had a + stress test. Now s/p Mid-CAB. Arrived to ICU - intubated, off pressors. Given 3 liters of IVF in OR, no blood products.  ICU Vital Signs Last 24 Hrs    /90 pulse 85  RR 14, O2 sat 100%    Phys exam:    not arousable, still sedated, intubated  clean left incision, 1 chest tube  no abdominal distention  cool periphery b/l, no pedal edema. pulse is better on right LE than left.    labs pending, CXR pending      a/p:    CAD s/p PCI, now s/p midCAB  HFrEF 30%  HTN  DM    hold sedation, pain control  wean vent to extubate  post op labs, abg, lactate and CXR  start IVF @  ml/hr  start cleviprex gtt  glycemic control-currently on insulin gtt  DVT proph        I have personally examined this critically ill patient at bedside, reviewed the medical record, bedside flowsheet, lab data, pertinent imaging studies and discussed the care with the critical care team on rounds. The care required multiple reassessments to optimize care. My personal Total Critical Care time spent was 45 minutes, minus procedures and/or teaching.

## 2018-06-02 LAB
ALBUMIN SERPL ELPH-MCNC: 3.8 G/DL — SIGNIFICANT CHANGE UP (ref 3.3–5)
ALP SERPL-CCNC: 67 U/L — SIGNIFICANT CHANGE UP (ref 40–120)
ALT FLD-CCNC: 36 U/L — SIGNIFICANT CHANGE UP (ref 10–45)
ANION GAP SERPL CALC-SCNC: 11 MMOL/L — SIGNIFICANT CHANGE UP (ref 5–17)
ANION GAP SERPL CALC-SCNC: 13 MMOL/L — SIGNIFICANT CHANGE UP (ref 5–17)
APTT BLD: 25.2 SEC — LOW (ref 27.5–37.4)
APTT BLD: 25.4 SEC — LOW (ref 27.5–37.4)
AST SERPL-CCNC: 25 U/L — SIGNIFICANT CHANGE UP (ref 10–40)
BASE EXCESS BLDA CALC-SCNC: -1.6 MMOL/L — SIGNIFICANT CHANGE UP (ref -2–3)
BILIRUB SERPL-MCNC: 0.8 MG/DL — SIGNIFICANT CHANGE UP (ref 0.2–1.2)
BUN SERPL-MCNC: 22 MG/DL — SIGNIFICANT CHANGE UP (ref 7–23)
BUN SERPL-MCNC: 26 MG/DL — HIGH (ref 7–23)
CALCIUM SERPL-MCNC: 8.6 MG/DL — SIGNIFICANT CHANGE UP (ref 8.4–10.5)
CALCIUM SERPL-MCNC: 8.9 MG/DL — SIGNIFICANT CHANGE UP (ref 8.4–10.5)
CHLORIDE SERPL-SCNC: 100 MMOL/L — SIGNIFICANT CHANGE UP (ref 96–108)
CHLORIDE SERPL-SCNC: 100 MMOL/L — SIGNIFICANT CHANGE UP (ref 96–108)
CO2 SERPL-SCNC: 25 MMOL/L — SIGNIFICANT CHANGE UP (ref 22–31)
CO2 SERPL-SCNC: 26 MMOL/L — SIGNIFICANT CHANGE UP (ref 22–31)
CREAT SERPL-MCNC: 0.77 MG/DL — SIGNIFICANT CHANGE UP (ref 0.5–1.3)
CREAT SERPL-MCNC: 0.99 MG/DL — SIGNIFICANT CHANGE UP (ref 0.5–1.3)
GAS PNL BLDA: SIGNIFICANT CHANGE UP
GAS PNL BLDA: SIGNIFICANT CHANGE UP
GLUCOSE BLDC GLUCOMTR-MCNC: 112 MG/DL — HIGH (ref 70–99)
GLUCOSE BLDC GLUCOMTR-MCNC: 114 MG/DL — HIGH (ref 70–99)
GLUCOSE BLDC GLUCOMTR-MCNC: 124 MG/DL — HIGH (ref 70–99)
GLUCOSE BLDC GLUCOMTR-MCNC: 129 MG/DL — HIGH (ref 70–99)
GLUCOSE BLDC GLUCOMTR-MCNC: 137 MG/DL — HIGH (ref 70–99)
GLUCOSE BLDC GLUCOMTR-MCNC: 138 MG/DL — HIGH (ref 70–99)
GLUCOSE BLDC GLUCOMTR-MCNC: 153 MG/DL — HIGH (ref 70–99)
GLUCOSE BLDC GLUCOMTR-MCNC: 155 MG/DL — HIGH (ref 70–99)
GLUCOSE BLDC GLUCOMTR-MCNC: 175 MG/DL — HIGH (ref 70–99)
GLUCOSE BLDC GLUCOMTR-MCNC: 226 MG/DL — HIGH (ref 70–99)
GLUCOSE BLDC GLUCOMTR-MCNC: 230 MG/DL — HIGH (ref 70–99)
GLUCOSE BLDC GLUCOMTR-MCNC: 86 MG/DL — SIGNIFICANT CHANGE UP (ref 70–99)
GLUCOSE BLDC GLUCOMTR-MCNC: 99 MG/DL — SIGNIFICANT CHANGE UP (ref 70–99)
GLUCOSE BLDC GLUCOMTR-MCNC: 99 MG/DL — SIGNIFICANT CHANGE UP (ref 70–99)
GLUCOSE SERPL-MCNC: 126 MG/DL — HIGH (ref 70–99)
GLUCOSE SERPL-MCNC: 246 MG/DL — HIGH (ref 70–99)
HCO3 BLDA-SCNC: 24 MMOL/L — SIGNIFICANT CHANGE UP (ref 21–28)
HCT VFR BLD CALC: 36.9 % — LOW (ref 39–50)
HCT VFR BLD CALC: 39.1 % — SIGNIFICANT CHANGE UP (ref 39–50)
HGB BLD-MCNC: 12 G/DL — LOW (ref 13–17)
HGB BLD-MCNC: 12.8 G/DL — LOW (ref 13–17)
INR BLD: 1.11 — SIGNIFICANT CHANGE UP (ref 0.88–1.16)
INR BLD: 1.17 — HIGH (ref 0.88–1.16)
MAGNESIUM SERPL-MCNC: 2.1 MG/DL — SIGNIFICANT CHANGE UP (ref 1.6–2.6)
MAGNESIUM SERPL-MCNC: 2.4 MG/DL — SIGNIFICANT CHANGE UP (ref 1.6–2.6)
MCHC RBC-ENTMCNC: 24.9 PG — LOW (ref 27–34)
MCHC RBC-ENTMCNC: 24.9 PG — LOW (ref 27–34)
MCHC RBC-ENTMCNC: 32.5 G/DL — SIGNIFICANT CHANGE UP (ref 32–36)
MCHC RBC-ENTMCNC: 32.7 G/DL — SIGNIFICANT CHANGE UP (ref 32–36)
MCV RBC AUTO: 76.1 FL — LOW (ref 80–100)
MCV RBC AUTO: 76.6 FL — LOW (ref 80–100)
PCO2 BLDA: 44 MMHG — SIGNIFICANT CHANGE UP (ref 35–48)
PH BLDA: 7.36 — SIGNIFICANT CHANGE UP (ref 7.35–7.45)
PHOSPHATE SERPL-MCNC: 2.9 MG/DL — SIGNIFICANT CHANGE UP (ref 2.5–4.5)
PHOSPHATE SERPL-MCNC: 3.9 MG/DL — SIGNIFICANT CHANGE UP (ref 2.5–4.5)
PLATELET # BLD AUTO: 212 K/UL — SIGNIFICANT CHANGE UP (ref 150–400)
PLATELET # BLD AUTO: 244 K/UL — SIGNIFICANT CHANGE UP (ref 150–400)
PO2 BLDA: 93 MMHG — SIGNIFICANT CHANGE UP (ref 83–108)
POTASSIUM SERPL-MCNC: 4.5 MMOL/L — SIGNIFICANT CHANGE UP (ref 3.5–5.3)
POTASSIUM SERPL-MCNC: 4.8 MMOL/L — SIGNIFICANT CHANGE UP (ref 3.5–5.3)
POTASSIUM SERPL-SCNC: 4.5 MMOL/L — SIGNIFICANT CHANGE UP (ref 3.5–5.3)
POTASSIUM SERPL-SCNC: 4.8 MMOL/L — SIGNIFICANT CHANGE UP (ref 3.5–5.3)
PROT SERPL-MCNC: 7 G/DL — SIGNIFICANT CHANGE UP (ref 6–8.3)
PROTHROM AB SERPL-ACNC: 12.4 SEC — SIGNIFICANT CHANGE UP (ref 9.8–12.7)
PROTHROM AB SERPL-ACNC: 13 SEC — HIGH (ref 9.8–12.7)
RBC # BLD: 4.82 M/UL — SIGNIFICANT CHANGE UP (ref 4.2–5.8)
RBC # BLD: 5.14 M/UL — SIGNIFICANT CHANGE UP (ref 4.2–5.8)
RBC # FLD: 18.3 % — HIGH (ref 10.3–16.9)
RBC # FLD: 18.4 % — HIGH (ref 10.3–16.9)
SAO2 % BLDA: 97 % — SIGNIFICANT CHANGE UP (ref 95–100)
SODIUM SERPL-SCNC: 137 MMOL/L — SIGNIFICANT CHANGE UP (ref 135–145)
SODIUM SERPL-SCNC: 138 MMOL/L — SIGNIFICANT CHANGE UP (ref 135–145)
WBC # BLD: 14.7 K/UL — HIGH (ref 3.8–10.5)
WBC # BLD: 14.7 K/UL — HIGH (ref 3.8–10.5)
WBC # FLD AUTO: 14.7 K/UL — HIGH (ref 3.8–10.5)
WBC # FLD AUTO: 14.7 K/UL — HIGH (ref 3.8–10.5)

## 2018-06-02 PROCEDURE — 99291 CRITICAL CARE FIRST HOUR: CPT

## 2018-06-02 PROCEDURE — 99233 SBSQ HOSP IP/OBS HIGH 50: CPT

## 2018-06-02 PROCEDURE — 71045 X-RAY EXAM CHEST 1 VIEW: CPT | Mod: 26,76

## 2018-06-02 RX ORDER — GABAPENTIN 400 MG/1
100 CAPSULE ORAL EVERY 8 HOURS
Qty: 0 | Refills: 0 | Status: DISCONTINUED | OUTPATIENT
Start: 2018-06-02 | End: 2018-06-05

## 2018-06-02 RX ORDER — METOPROLOL TARTRATE 50 MG
25 TABLET ORAL THREE TIMES A DAY
Qty: 0 | Refills: 0 | Status: DISCONTINUED | OUTPATIENT
Start: 2018-06-02 | End: 2018-06-03

## 2018-06-02 RX ORDER — POLYETHYLENE GLYCOL 3350 17 G/17G
17 POWDER, FOR SOLUTION ORAL DAILY
Qty: 0 | Refills: 0 | Status: DISCONTINUED | OUTPATIENT
Start: 2018-06-02 | End: 2018-06-04

## 2018-06-02 RX ORDER — SENNA PLUS 8.6 MG/1
2 TABLET ORAL AT BEDTIME
Qty: 0 | Refills: 0 | Status: DISCONTINUED | OUTPATIENT
Start: 2018-06-02 | End: 2018-06-05

## 2018-06-02 RX ORDER — DULOXETINE HYDROCHLORIDE 30 MG/1
60 CAPSULE, DELAYED RELEASE ORAL DAILY
Qty: 0 | Refills: 0 | Status: DISCONTINUED | OUTPATIENT
Start: 2018-06-02 | End: 2018-06-02

## 2018-06-02 RX ORDER — SODIUM CHLORIDE 9 MG/ML
500 INJECTION, SOLUTION INTRAVENOUS ONCE
Qty: 0 | Refills: 0 | Status: COMPLETED | OUTPATIENT
Start: 2018-06-02 | End: 2018-06-02

## 2018-06-02 RX ORDER — DULOXETINE HYDROCHLORIDE 30 MG/1
90 CAPSULE, DELAYED RELEASE ORAL DAILY
Qty: 0 | Refills: 0 | Status: DISCONTINUED | OUTPATIENT
Start: 2018-06-02 | End: 2018-06-05

## 2018-06-02 RX ORDER — HYDRALAZINE HCL 50 MG
50 TABLET ORAL EVERY 12 HOURS
Qty: 0 | Refills: 0 | Status: DISCONTINUED | OUTPATIENT
Start: 2018-06-02 | End: 2018-06-03

## 2018-06-02 RX ORDER — ACETAMINOPHEN 500 MG
650 TABLET ORAL EVERY 6 HOURS
Qty: 0 | Refills: 0 | Status: DISCONTINUED | OUTPATIENT
Start: 2018-06-02 | End: 2018-06-05

## 2018-06-02 RX ORDER — ALBUMIN HUMAN 25 %
250 VIAL (ML) INTRAVENOUS ONCE
Qty: 0 | Refills: 0 | Status: COMPLETED | OUTPATIENT
Start: 2018-06-02 | End: 2018-06-02

## 2018-06-02 RX ORDER — OXYCODONE AND ACETAMINOPHEN 5; 325 MG/1; MG/1
1 TABLET ORAL EVERY 6 HOURS
Qty: 0 | Refills: 0 | Status: DISCONTINUED | OUTPATIENT
Start: 2018-06-02 | End: 2018-06-02

## 2018-06-02 RX ORDER — DOCUSATE SODIUM 100 MG
100 CAPSULE ORAL THREE TIMES A DAY
Qty: 0 | Refills: 0 | Status: DISCONTINUED | OUTPATIENT
Start: 2018-06-02 | End: 2018-06-05

## 2018-06-02 RX ORDER — METOPROLOL TARTRATE 50 MG
12.5 TABLET ORAL EVERY 12 HOURS
Qty: 0 | Refills: 0 | Status: DISCONTINUED | OUTPATIENT
Start: 2018-06-02 | End: 2018-06-02

## 2018-06-02 RX ORDER — PANTOPRAZOLE SODIUM 20 MG/1
40 TABLET, DELAYED RELEASE ORAL
Qty: 0 | Refills: 0 | Status: DISCONTINUED | OUTPATIENT
Start: 2018-06-02 | End: 2018-06-04

## 2018-06-02 RX ORDER — OXYCODONE AND ACETAMINOPHEN 5; 325 MG/1; MG/1
1 TABLET ORAL EVERY 6 HOURS
Qty: 0 | Refills: 0 | Status: DISCONTINUED | OUTPATIENT
Start: 2018-06-02 | End: 2018-06-05

## 2018-06-02 RX ORDER — FUROSEMIDE 40 MG
40 TABLET ORAL DAILY
Qty: 0 | Refills: 0 | Status: DISCONTINUED | OUTPATIENT
Start: 2018-06-03 | End: 2018-06-03

## 2018-06-02 RX ORDER — FUROSEMIDE 40 MG
20 TABLET ORAL ONCE
Qty: 0 | Refills: 0 | Status: COMPLETED | OUTPATIENT
Start: 2018-06-02 | End: 2018-06-02

## 2018-06-02 RX ORDER — OXYCODONE AND ACETAMINOPHEN 5; 325 MG/1; MG/1
2 TABLET ORAL EVERY 4 HOURS
Qty: 0 | Refills: 0 | Status: DISCONTINUED | OUTPATIENT
Start: 2018-06-02 | End: 2018-06-05

## 2018-06-02 RX ORDER — POTASSIUM CHLORIDE 20 MEQ
20 PACKET (EA) ORAL DAILY
Qty: 0 | Refills: 0 | Status: DISCONTINUED | OUTPATIENT
Start: 2018-06-03 | End: 2018-06-03

## 2018-06-02 RX ADMIN — OXYCODONE AND ACETAMINOPHEN 2 TABLET(S): 5; 325 TABLET ORAL at 10:15

## 2018-06-02 RX ADMIN — CLOPIDOGREL BISULFATE 75 MILLIGRAM(S): 75 TABLET, FILM COATED ORAL at 12:34

## 2018-06-02 RX ADMIN — FENTANYL CITRATE 25 MICROGRAM(S): 50 INJECTION INTRAVENOUS at 02:35

## 2018-06-02 RX ADMIN — HEPARIN SODIUM 5000 UNIT(S): 5000 INJECTION INTRAVENOUS; SUBCUTANEOUS at 05:11

## 2018-06-02 RX ADMIN — OXYCODONE AND ACETAMINOPHEN 2 TABLET(S): 5; 325 TABLET ORAL at 21:38

## 2018-06-02 RX ADMIN — HEPARIN SODIUM 5000 UNIT(S): 5000 INJECTION INTRAVENOUS; SUBCUTANEOUS at 21:38

## 2018-06-02 RX ADMIN — OXYCODONE AND ACETAMINOPHEN 1 TABLET(S): 5; 325 TABLET ORAL at 06:10

## 2018-06-02 RX ADMIN — GABAPENTIN 100 MILLIGRAM(S): 400 CAPSULE ORAL at 12:38

## 2018-06-02 RX ADMIN — PANTOPRAZOLE SODIUM 40 MILLIGRAM(S): 20 TABLET, DELAYED RELEASE ORAL at 06:27

## 2018-06-02 RX ADMIN — OXYCODONE AND ACETAMINOPHEN 2 TABLET(S): 5; 325 TABLET ORAL at 22:30

## 2018-06-02 RX ADMIN — Medication 100 MILLIGRAM(S): at 21:38

## 2018-06-02 RX ADMIN — SODIUM CHLORIDE 1000 MILLILITER(S): 9 INJECTION, SOLUTION INTRAVENOUS at 07:59

## 2018-06-02 RX ADMIN — Medication 50 MILLIGRAM(S): at 22:04

## 2018-06-02 RX ADMIN — LIDOCAINE 1 PATCH: 4 CREAM TOPICAL at 21:37

## 2018-06-02 RX ADMIN — Medication 20 MILLIGRAM(S): at 13:00

## 2018-06-02 RX ADMIN — GABAPENTIN 100 MILLIGRAM(S): 400 CAPSULE ORAL at 21:38

## 2018-06-02 RX ADMIN — INSULIN HUMAN 1 UNIT(S)/HR: 100 INJECTION, SOLUTION SUBCUTANEOUS at 16:15

## 2018-06-02 RX ADMIN — OXYCODONE AND ACETAMINOPHEN 2 TABLET(S): 5; 325 TABLET ORAL at 09:15

## 2018-06-02 RX ADMIN — Medication 12.5 MILLIGRAM(S): at 06:27

## 2018-06-02 RX ADMIN — OXYCODONE AND ACETAMINOPHEN 1 TABLET(S): 5; 325 TABLET ORAL at 05:12

## 2018-06-02 RX ADMIN — HEPARIN SODIUM 5000 UNIT(S): 5000 INJECTION INTRAVENOUS; SUBCUTANEOUS at 13:00

## 2018-06-02 RX ADMIN — ATORVASTATIN CALCIUM 40 MILLIGRAM(S): 80 TABLET, FILM COATED ORAL at 21:38

## 2018-06-02 RX ADMIN — Medication 81 MILLIGRAM(S): at 12:34

## 2018-06-02 RX ADMIN — GABAPENTIN 100 MILLIGRAM(S): 400 CAPSULE ORAL at 12:33

## 2018-06-02 RX ADMIN — SENNA PLUS 2 TABLET(S): 8.6 TABLET ORAL at 21:38

## 2018-06-02 RX ADMIN — Medication 125 MILLILITER(S): at 04:05

## 2018-06-02 RX ADMIN — LIDOCAINE 1 PATCH: 4 CREAM TOPICAL at 12:30

## 2018-06-02 RX ADMIN — Medication 100 MILLIGRAM(S): at 05:11

## 2018-06-02 RX ADMIN — Medication 100 MILLIGRAM(S): at 13:00

## 2018-06-02 RX ADMIN — Medication 12.5 MILLIGRAM(S): at 17:56

## 2018-06-02 RX ADMIN — Medication 100 MILLIGRAM(S): at 12:34

## 2018-06-02 RX ADMIN — Medication 100 MILLIGRAM(S): at 05:12

## 2018-06-02 RX ADMIN — DULOXETINE HYDROCHLORIDE 60 MILLIGRAM(S): 30 CAPSULE, DELAYED RELEASE ORAL at 12:34

## 2018-06-02 NOTE — PROGRESS NOTE ADULT - SUBJECTIVE AND OBJECTIVE BOX
Uneventful Night  Extubated, off Cleviprex gtt    ICU Vital Signs Last 24 Hrs    T(C): 36.3 (02 Jun 2018 05:00), Max: 36.5 (01 Jun 2018 17:13)  T(F): 97.3 (02 Jun 2018 05:00), Max: 97.7 (01 Jun 2018 17:13)  HR: 68 (02 Jun 2018 08:00) (60 - 84)  ABP: 124/50 (02 Jun 2018 08:00) (110/46 - 180/84)  ABP(mean): 71 (02 Jun 2018 08:00) (64 - 118)  RR: 21 (02 Jun 2018 08:00) (11 - 21)  SpO2: 97% (02 Jun 2018 08:00) (95% - 100%)    I&O's Detail    01 Jun 2018 07:01  -  02 Jun 2018 07:00  --------------------------------------------------------  IN:    Albumin 5%  - 250 mL: 250 mL    clevidipine Infusion: 50 mL    dexmedetomidine Infusion: 52.2 mL    insulin Infusion: 107 mL    IV PiggyBack: 200 mL    sodium chloride 0.9%.: 150 mL  Total IN: 809.2 mL    OUT:    Chest Tube: 320 mL    Indwelling Catheter - Urethral: 1160 mL  Total OUT: 1480 mL    Total NET: -670.8 mL      02 Jun 2018 07:01  -  02 Jun 2018 08:29  --------------------------------------------------------  IN:    insulin Infusion: 6.5 mL    Lactated Ringers IV Bolus: 500 mL    sodium chloride 0.9%.: 10 mL  Total IN: 516.5 mL    OUT:    Chest Tube: 20 mL    Indwelling Catheter - Urethral: 30 mL  Total OUT: 50 mL    Total NET: 466.5 mL    Phys exam:    alert, awake, verbal  clean left chest incision  right IJ tlc - clean  soft abdomen, non distended  warm periphery, + distal pulses      ABG - ( 02 Jun 2018 03:25 )  pH, Arterial: 7.40  pH, Blood: x     /  pCO2: 41    /  pO2: 103   / HCO3: 25    / Base Excess: 0.2   /  SaO2: 98        a/p:    POD #1 - midCAB   CAD s/p PCI,   HFrEF 30%  HTN  DM    Pain management eval  Ambulation  d/c chest tube, a line and fields  PO diet with glycemic control.   titrate beta blocker as tolerated  DVT proph  PO ASA, plavix and statin    I have personally examined this critically ill patient at bedside, reviewed the medical record, bedside flowsheet, lab data, pertinent imaging studies and discussed the care with the critical care team on rounds. The care required multiple reassessments to optimize care. My personal Total Critical Care time spent was 35 minutes, minus procedures and/or teaching.

## 2018-06-02 NOTE — PROGRESS NOTE ADULT - SUBJECTIVE AND OBJECTIVE BOX
INTERVAL HISTORY:  	awake,alert, sitting up. c/o incisional pain      MEDICATIONS:  clevidipine Infusion 2 mG/Hr IV Continuous <Continuous>  metoprolol tartrate 12.5 milliGRAM(s) Oral every 12 hours    ceFAZolin   IVPB 2000 milliGRAM(s) IV Intermittent every 8 hours      acetaminophen   Tablet 650 milliGRAM(s) Oral every 6 hours PRN  DULoxetine 60 milliGRAM(s) Oral daily  oxyCODONE    5 mG/acetaminophen 325 mG 1 Tablet(s) Oral every 6 hours PRN    docusate sodium 100 milliGRAM(s) Oral three times a day  pantoprazole    Tablet 40 milliGRAM(s) Oral before breakfast  polyethylene glycol 3350 17 Gram(s) Oral daily PRN  senna 2 Tablet(s) Oral at bedtime    atorvastatin 40 milliGRAM(s) Oral at bedtime  dextrose 50% Injectable 50 milliLiter(s) IV Push every 15 minutes  dextrose 50% Injectable 25 milliLiter(s) IV Push every 15 minutes  insulin Infusion 1 Unit(s)/Hr IV Continuous <Continuous>    aspirin enteric coated 81 milliGRAM(s) Oral daily  clopidogrel Tablet 75 milliGRAM(s) Oral daily  heparin  Injectable 5000 Unit(s) SubCutaneous every 8 hours  lidocaine   Patch 1 Patch Transdermal daily  sodium chloride 0.9%. 1000 milliLiter(s) IV Continuous <Continuous>          PHYSICAL EXAM:  T(C): 36.3 (06-02-18 @ 05:00), Max: 36.5 (06-01-18 @ 17:13)  HR: 68 (06-02-18 @ 04:59) (62 - 84)  BP: --  RR: 15 (06-02-18 @ 04:59) (11 - 17)  SpO2: 100% (06-02-18 @ 04:59) (95% - 100%)  Wt(kg): --  I&O's Summary    01 Jun 2018 07:01  -  02 Jun 2018 06:08  --------------------------------------------------------  IN: 776.2 mL / OUT: 1330 mL / NET: -553.8 mL      Height (cm): 185.42 (06-01 @ 17:14)  Weight (kg): 116.6 (06-01 @ 17:14)  BMI (kg/m2): 33.9 (06-01 @ 17:14)  BSA (m2): 2.39 (06-01 @ 17:14)    Appearance: Normal	  HEENT:   Normal oral mucosa, PERRL, EOMI	  Lymphatic: No lymphadenopathy  Cardiovascular: Normal S1 S2, No JVD, No murmurs, No edema  Respiratory: Lungs:decreased BS	  Psychiatry: A & O x 3, Mood & affect appropriate  Gastrointestinal:  Soft, Non-tender, + BS	  Skin: No rashes, No ecchymoses, No cyanosis  Neurologic: Non-focal  Extremities: Normal range of motion, No clubbing, cyanosis or edema  Vascular: Peripheral pulses palpable 2+ bilaterally    TELEMETRY: 	NSR    ECG:  NSR,no acute chnages	  RADIOLOGY:   DIAGNOSTIC TESTING:  [ ] Echocardiogram:  [ ]  Catheterization:  [ ] Stress Test:    OTHER: 	    LABS:	 	    CARDIAC MARKERS:                                  12.8   14.7  )-----------( 244      ( 02 Jun 2018 03:17 )             39.1     06-02    137  |  100  |  22  ----------------------------<  126<H>  4.8   |  26  |  0.77    Ca    8.9      02 Jun 2018 03:17  Phos  2.9     06-02  Mg     2.4     06-02    TPro  7.0  /  Alb  3.8  /  TBili  0.8  /  DBili  x   /  AST  25  /  ALT  36  /  AlkPhos  67  06-02    proBNP:   Lipid Profile:   HgA1c:   TSH:     ASSESSMENT/PLAN: 	  s/p mid cab, as per cTS ,monitor for chf;if no contraindication ACE/ARB and continue BB for CM

## 2018-06-02 NOTE — PHYSICAL THERAPY INITIAL EVALUATION ADULT - PLANNED THERAPY INTERVENTIONS, PT EVAL
strengthening/bed mobility training/ROM/stair neg assessment/balance training/gait training/transfer training/postural re-education

## 2018-06-02 NOTE — PHYSICAL THERAPY INITIAL EVALUATION ADULT - ADDITIONAL COMMENTS
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 Does not attend gym as he used to previously. No use of DME.

## 2018-06-02 NOTE — PHYSICAL THERAPY INITIAL EVALUATION ADULT - MODALITIES TREATMENT COMMENTS
Incentive Spirometer 750 cc x 3, reviewed splinted coughing, thoracotomy precautions with good understanding

## 2018-06-02 NOTE — PHYSICAL THERAPY INITIAL EVALUATION ADULT - GENERAL OBSERVATIONS, REHAB EVAL
Patient received standing (+) EKG (+) 2 L nc (+) heplock no apparent distress, finished ambulating with RN

## 2018-06-03 LAB
ANION GAP SERPL CALC-SCNC: 10 MMOL/L — SIGNIFICANT CHANGE UP (ref 5–17)
APTT BLD: 25.1 SEC — LOW (ref 27.5–37.4)
BUN SERPL-MCNC: 24 MG/DL — HIGH (ref 7–23)
CALCIUM SERPL-MCNC: 9.1 MG/DL — SIGNIFICANT CHANGE UP (ref 8.4–10.5)
CHLORIDE SERPL-SCNC: 98 MMOL/L — SIGNIFICANT CHANGE UP (ref 96–108)
CO2 SERPL-SCNC: 29 MMOL/L — SIGNIFICANT CHANGE UP (ref 22–31)
CREAT SERPL-MCNC: 1.03 MG/DL — SIGNIFICANT CHANGE UP (ref 0.5–1.3)
GLUCOSE BLDC GLUCOMTR-MCNC: 102 MG/DL — HIGH (ref 70–99)
GLUCOSE BLDC GLUCOMTR-MCNC: 148 MG/DL — HIGH (ref 70–99)
GLUCOSE BLDC GLUCOMTR-MCNC: 158 MG/DL — HIGH (ref 70–99)
GLUCOSE BLDC GLUCOMTR-MCNC: 169 MG/DL — HIGH (ref 70–99)
GLUCOSE BLDC GLUCOMTR-MCNC: 171 MG/DL — HIGH (ref 70–99)
GLUCOSE BLDC GLUCOMTR-MCNC: 189 MG/DL — HIGH (ref 70–99)
GLUCOSE BLDC GLUCOMTR-MCNC: 229 MG/DL — HIGH (ref 70–99)
GLUCOSE BLDC GLUCOMTR-MCNC: 271 MG/DL — HIGH (ref 70–99)
GLUCOSE BLDC GLUCOMTR-MCNC: 303 MG/DL — HIGH (ref 70–99)
GLUCOSE BLDC GLUCOMTR-MCNC: 51 MG/DL — LOW (ref 70–99)
GLUCOSE BLDC GLUCOMTR-MCNC: 51 MG/DL — LOW (ref 70–99)
GLUCOSE BLDC GLUCOMTR-MCNC: 72 MG/DL — SIGNIFICANT CHANGE UP (ref 70–99)
GLUCOSE SERPL-MCNC: 51 MG/DL — LOW (ref 70–99)
HCT VFR BLD CALC: 36.5 % — LOW (ref 39–50)
HGB BLD-MCNC: 11.8 G/DL — LOW (ref 13–17)
INR BLD: 1.23 — HIGH (ref 0.88–1.16)
MAGNESIUM SERPL-MCNC: 2.4 MG/DL — SIGNIFICANT CHANGE UP (ref 1.6–2.6)
MCHC RBC-ENTMCNC: 25.2 PG — LOW (ref 27–34)
MCHC RBC-ENTMCNC: 32.3 G/DL — SIGNIFICANT CHANGE UP (ref 32–36)
MCV RBC AUTO: 77.8 FL — LOW (ref 80–100)
PHOSPHATE SERPL-MCNC: 3.1 MG/DL — SIGNIFICANT CHANGE UP (ref 2.5–4.5)
PLATELET # BLD AUTO: 212 K/UL — SIGNIFICANT CHANGE UP (ref 150–400)
POTASSIUM SERPL-MCNC: 4.1 MMOL/L — SIGNIFICANT CHANGE UP (ref 3.5–5.3)
POTASSIUM SERPL-SCNC: 4.1 MMOL/L — SIGNIFICANT CHANGE UP (ref 3.5–5.3)
PROTHROM AB SERPL-ACNC: 13.7 SEC — HIGH (ref 9.8–12.7)
RBC # BLD: 4.69 M/UL — SIGNIFICANT CHANGE UP (ref 4.2–5.8)
RBC # FLD: 18.7 % — HIGH (ref 10.3–16.9)
SODIUM SERPL-SCNC: 137 MMOL/L — SIGNIFICANT CHANGE UP (ref 135–145)
WBC # BLD: 12.6 K/UL — HIGH (ref 3.8–10.5)
WBC # FLD AUTO: 12.6 K/UL — HIGH (ref 3.8–10.5)

## 2018-06-03 PROCEDURE — 99291 CRITICAL CARE FIRST HOUR: CPT

## 2018-06-03 PROCEDURE — 71045 X-RAY EXAM CHEST 1 VIEW: CPT | Mod: 26

## 2018-06-03 PROCEDURE — 99233 SBSQ HOSP IP/OBS HIGH 50: CPT

## 2018-06-03 RX ORDER — HYDRALAZINE HCL 50 MG
50 TABLET ORAL EVERY 8 HOURS
Qty: 0 | Refills: 0 | Status: DISCONTINUED | OUTPATIENT
Start: 2018-06-03 | End: 2018-06-05

## 2018-06-03 RX ORDER — FUROSEMIDE 40 MG
40 TABLET ORAL
Qty: 0 | Refills: 0 | Status: DISCONTINUED | OUTPATIENT
Start: 2018-06-03 | End: 2018-06-05

## 2018-06-03 RX ORDER — INSULIN LISPRO 100/ML
VIAL (ML) SUBCUTANEOUS
Qty: 0 | Refills: 0 | Status: DISCONTINUED | OUTPATIENT
Start: 2018-06-03 | End: 2018-06-05

## 2018-06-03 RX ORDER — GLUCAGON INJECTION, SOLUTION 0.5 MG/.1ML
1 INJECTION, SOLUTION SUBCUTANEOUS ONCE
Qty: 0 | Refills: 0 | Status: DISCONTINUED | OUTPATIENT
Start: 2018-06-03 | End: 2018-06-04

## 2018-06-03 RX ORDER — SODIUM CHLORIDE 9 MG/ML
1000 INJECTION, SOLUTION INTRAVENOUS
Qty: 0 | Refills: 0 | Status: DISCONTINUED | OUTPATIENT
Start: 2018-06-03 | End: 2018-06-04

## 2018-06-03 RX ORDER — METOPROLOL TARTRATE 50 MG
50 TABLET ORAL THREE TIMES A DAY
Qty: 0 | Refills: 0 | Status: DISCONTINUED | OUTPATIENT
Start: 2018-06-03 | End: 2018-06-05

## 2018-06-03 RX ORDER — DEXTROSE 50 % IN WATER 50 %
15 SYRINGE (ML) INTRAVENOUS ONCE
Qty: 0 | Refills: 0 | Status: DISCONTINUED | OUTPATIENT
Start: 2018-06-03 | End: 2018-06-04

## 2018-06-03 RX ORDER — POTASSIUM CHLORIDE 20 MEQ
10 PACKET (EA) ORAL
Qty: 0 | Refills: 0 | Status: DISCONTINUED | OUTPATIENT
Start: 2018-06-03 | End: 2018-06-04

## 2018-06-03 RX ADMIN — DULOXETINE HYDROCHLORIDE 90 MILLIGRAM(S): 30 CAPSULE, DELAYED RELEASE ORAL at 11:52

## 2018-06-03 RX ADMIN — Medication 100 MILLIGRAM(S): at 07:11

## 2018-06-03 RX ADMIN — LIDOCAINE 1 PATCH: 4 CREAM TOPICAL at 11:52

## 2018-06-03 RX ADMIN — Medication 50 MILLIGRAM(S): at 21:41

## 2018-06-03 RX ADMIN — Medication 40 MILLIGRAM(S): at 07:13

## 2018-06-03 RX ADMIN — Medication 100 MILLIGRAM(S): at 07:10

## 2018-06-03 RX ADMIN — GABAPENTIN 100 MILLIGRAM(S): 400 CAPSULE ORAL at 13:37

## 2018-06-03 RX ADMIN — GABAPENTIN 100 MILLIGRAM(S): 400 CAPSULE ORAL at 07:10

## 2018-06-03 RX ADMIN — PANTOPRAZOLE SODIUM 40 MILLIGRAM(S): 20 TABLET, DELAYED RELEASE ORAL at 07:10

## 2018-06-03 RX ADMIN — HEPARIN SODIUM 5000 UNIT(S): 5000 INJECTION INTRAVENOUS; SUBCUTANEOUS at 07:10

## 2018-06-03 RX ADMIN — HEPARIN SODIUM 5000 UNIT(S): 5000 INJECTION INTRAVENOUS; SUBCUTANEOUS at 13:37

## 2018-06-03 RX ADMIN — Medication 81 MILLIGRAM(S): at 11:52

## 2018-06-03 RX ADMIN — Medication 50 MILLIGRAM(S): at 13:37

## 2018-06-03 RX ADMIN — LIDOCAINE 1 PATCH: 4 CREAM TOPICAL at 00:50

## 2018-06-03 RX ADMIN — Medication 100 MILLIGRAM(S): at 11:52

## 2018-06-03 RX ADMIN — ATORVASTATIN CALCIUM 40 MILLIGRAM(S): 80 TABLET, FILM COATED ORAL at 21:40

## 2018-06-03 RX ADMIN — Medication 100 MILLIGRAM(S): at 21:40

## 2018-06-03 RX ADMIN — Medication 40 MILLIGRAM(S): at 13:39

## 2018-06-03 RX ADMIN — Medication 50 MILLIGRAM(S): at 07:11

## 2018-06-03 RX ADMIN — HEPARIN SODIUM 5000 UNIT(S): 5000 INJECTION INTRAVENOUS; SUBCUTANEOUS at 21:40

## 2018-06-03 RX ADMIN — Medication 50 MILLIGRAM(S): at 13:39

## 2018-06-03 RX ADMIN — OXYCODONE AND ACETAMINOPHEN 2 TABLET(S): 5; 325 TABLET ORAL at 07:11

## 2018-06-03 RX ADMIN — OXYCODONE AND ACETAMINOPHEN 1 TABLET(S): 5; 325 TABLET ORAL at 21:00

## 2018-06-03 RX ADMIN — Medication 25 MILLIGRAM(S): at 07:11

## 2018-06-03 RX ADMIN — SENNA PLUS 2 TABLET(S): 8.6 TABLET ORAL at 21:41

## 2018-06-03 RX ADMIN — OXYCODONE AND ACETAMINOPHEN 1 TABLET(S): 5; 325 TABLET ORAL at 22:00

## 2018-06-03 RX ADMIN — OXYCODONE AND ACETAMINOPHEN 2 TABLET(S): 5; 325 TABLET ORAL at 08:04

## 2018-06-03 RX ADMIN — GABAPENTIN 100 MILLIGRAM(S): 400 CAPSULE ORAL at 21:41

## 2018-06-03 RX ADMIN — CLOPIDOGREL BISULFATE 75 MILLIGRAM(S): 75 TABLET, FILM COATED ORAL at 11:52

## 2018-06-03 NOTE — PROGRESS NOTE ADULT - SUBJECTIVE AND OBJECTIVE BOX
Hypoglycemic overnight while on insulin gtt. Tolerates PO diet. diuresed yesterday  ICU Vital Signs Last 24 Hrs    T(C): 36.7 (03 Jun 2018 05:00), Max: 37.2 (02 Jun 2018 16:30)  T(F): 98 (03 Jun 2018 05:00), Max: 98.9 (02 Jun 2018 16:30)  HR: 90 (03 Jun 2018 08:00) (62 - 98)  BP: 155/75 (03 Jun 2018 08:00) (116/54 - 161/71)  BP(mean): 108 (03 Jun 2018 08:00) (70 - 115)  ABP: 134/80 (02 Jun 2018 17:00) (106/68 - 166/70)  ABP(mean): 102 (02 Jun 2018 17:00) (70 - 102)  RR: 18 (03 Jun 2018 08:00) (11 - 34)  SpO2: 95% (03 Jun 2018 08:00) (89% - 99%)    I&O's Detail    02 Jun 2018 07:01  -  03 Jun 2018 07:00  --------------------------------------------------------  IN:    insulin Infusion: 240.5 mL    IV PiggyBack: 100 mL    Lactated Ringers IV Bolus: 500 mL    Oral Fluid: 450 mL    sodium chloride 0.9%.: 100 mL  Total IN: 1390.5 mL    OUT:    Chest Tube: 80 mL    Indwelling Catheter - Urethral: 240 mL    Voided: 700 mL  Total OUT: 1020 mL    Total NET: 370.5 mL      03 Jun 2018 07:01  -  03 Jun 2018 08:46  --------------------------------------------------------  IN:    insulin Infusion: 8.5 mL  Total IN: 8.5 mL    OUT:  Total OUT: 0 mL    Total NET: 8.5 mL      Phys exam:  alert, awake, verbal  follows commands  right IJ Central access, site is clean  clean incision  soft abdomen    Hg 11.8, Cr 1.03    ABG - ( 02 Jun 2018 12:04 )  pH, Arterial: 7.36  pH, Blood: x     /  pCO2: 44    /  pO2: 93    / HCO3: 24    / Base Excess: -1.6  /  SaO2: 97        POD #2 - midCAB   CAD s/p PCI   HFrEF 30%  HTN  DM    Titrate lopressor and hydralazine  cont with DAPT and Statin  wife will bring insulin pump from home  cont with Pain meds  removed central access  Chest tubes removed 6/2  PO diet with glycemic control.   DVT proph        I have personally examined this critically ill patient at bedside, reviewed the medical record, bedside flowsheet, lab data, pertinent imaging studies and discussed the care with the critical care team on rounds. The care required multiple reassessments to optimize care. My personal Total Critical Care time spent was 35 minutes, minus procedures and/or teaching.

## 2018-06-03 NOTE — PROGRESS NOTE ADULT - SUBJECTIVE AND OBJECTIVE BOX
INTERVAL HISTORY:  	sitting in chair- less pain than yesterday- no sob      MEDICATIONS:  furosemide    Tablet 40 milliGRAM(s) Oral daily  hydrALAZINE 50 milliGRAM(s) Oral every 12 hours  metoprolol tartrate 25 milliGRAM(s) Oral three times a day        acetaminophen   Tablet 650 milliGRAM(s) Oral every 6 hours PRN  DULoxetine 90 milliGRAM(s) Oral daily  gabapentin 100 milliGRAM(s) Oral every 8 hours  oxyCODONE    5 mG/acetaminophen 325 mG 2 Tablet(s) Oral every 4 hours PRN  oxyCODONE    5 mG/acetaminophen 325 mG 1 Tablet(s) Oral every 6 hours PRN    docusate sodium 100 milliGRAM(s) Oral three times a day  pantoprazole    Tablet 40 milliGRAM(s) Oral before breakfast  polyethylene glycol 3350 17 Gram(s) Oral daily PRN  senna 2 Tablet(s) Oral at bedtime    atorvastatin 40 milliGRAM(s) Oral at bedtime  dextrose 50% Injectable 50 milliLiter(s) IV Push every 15 minutes  dextrose 50% Injectable 25 milliLiter(s) IV Push every 15 minutes  insulin Infusion 1 Unit(s)/Hr IV Continuous <Continuous>    aspirin enteric coated 81 milliGRAM(s) Oral daily  clopidogrel Tablet 75 milliGRAM(s) Oral daily  heparin  Injectable 5000 Unit(s) SubCutaneous every 8 hours  lidocaine   Patch 1 Patch Transdermal daily  potassium chloride    Tablet ER 20 milliEquivalent(s) Oral daily  sodium chloride 0.9%. 1000 milliLiter(s) IV Continuous <Continuous>          PHYSICAL EXAM:  T(C): 36.7 (06-03-18 @ 05:00), Max: 37.2 (06-02-18 @ 16:30)  HR: 92 (06-03-18 @ 07:00) (62 - 98)  BP: 160/79 (06-03-18 @ 07:00) (116/54 - 161/71)  RR: 16 (06-03-18 @ 07:00) (11 - 34)  SpO2: 95% (06-03-18 @ 07:00) (89% - 99%)  Wt(kg): --  I&O's Summary    02 Jun 2018 07:01  -  03 Jun 2018 07:00  --------------------------------------------------------  IN: 1375 mL / OUT: 1020 mL / NET: 355 mL      Height (cm): 185.42 (06-02 @ 08:28)  Weight (kg): 116.6 (06-02 @ 08:28)  BMI (kg/m2): 33.9 (06-02 @ 08:28)  BSA (m2): 2.39 (06-02 @ 08:28)    Appearance: Normal	  HEENT:   Normal oral mucosa, PERRL, EOMI	  Lymphatic: No lymphadenopathy  Cardiovascular: Normal S1 S2, No JVD, No murmurs, No edema  Respiratory: Lungs minimal decreased BS at bases  Psychiatry: A & O x 3, Mood & affect appropriate  Gastrointestinal:  Soft, Non-tender, + BS	  Skin: No rashes, No ecchymoses, No cyanosis  Neurologic: Non-focal  Extremities: Normal range of motion, No clubbing, cyanosis or edema  Vascular: Peripheral pulses palpable 2+ bilaterally    TELEMETRY: NSR	    ECG:  	  RADIOLOGY:   DIAGNOSTIC TESTING:  [ ] Echocardiogram:  [ ]  Catheterization:  [ ] Stress Test:    OTHER: 	    LABS:	 	    CARDIAC MARKERS:                                  11.8   12.6  )-----------( 212      ( 03 Jun 2018 03:35 )             36.5     06-03    137  |  98  |  24<H>  ----------------------------<  51<L>  4.1   |  29  |  1.03    Ca    9.1      03 Jun 2018 03:36  Phos  3.1     06-03  Mg     2.4     06-03    TPro  7.0  /  Alb  3.8  /  TBili  0.8  /  DBili  x   /  AST  25  /  ALT  36  /  AlkPhos  67  06-02    proBNP:   Lipid Profile:   HgA1c:   TSH:     ASSESSMENT/PLAN: 	  as per cts, cont ststain asa +plavix;BB + hydralazine for bp/cm- no ACE/ARB as reports allergic reaction in past

## 2018-06-04 DIAGNOSIS — E78.5 HYPERLIPIDEMIA, UNSPECIFIED: ICD-10-CM
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
## 2018-06-04 PROBLEM — Z86.79 HISTORY OF CORONARY ARTERY DISEASE: Status: RESOLVED | Noted: 2018-05-30 | Resolved: 2018-06-04

## 2018-06-04 LAB
ANION GAP SERPL CALC-SCNC: 12 MMOL/L — SIGNIFICANT CHANGE UP (ref 5–17)
APTT BLD: 25.7 SEC — LOW (ref 27.5–37.4)
BUN SERPL-MCNC: 29 MG/DL — HIGH (ref 7–23)
CALCIUM SERPL-MCNC: 8.8 MG/DL — SIGNIFICANT CHANGE UP (ref 8.4–10.5)
CHLORIDE SERPL-SCNC: 95 MMOL/L — LOW (ref 96–108)
CO2 SERPL-SCNC: 28 MMOL/L — SIGNIFICANT CHANGE UP (ref 22–31)
CREAT SERPL-MCNC: 1.19 MG/DL — SIGNIFICANT CHANGE UP (ref 0.5–1.3)
GLUCOSE BLDC GLUCOMTR-MCNC: 134 MG/DL — HIGH (ref 70–99)
GLUCOSE BLDC GLUCOMTR-MCNC: 189 MG/DL — HIGH (ref 70–99)
GLUCOSE BLDC GLUCOMTR-MCNC: 216 MG/DL — HIGH (ref 70–99)
GLUCOSE BLDC GLUCOMTR-MCNC: 247 MG/DL — HIGH (ref 70–99)
GLUCOSE SERPL-MCNC: 256 MG/DL — HIGH (ref 70–99)
HCT VFR BLD CALC: 34.9 % — LOW (ref 39–50)
HGB BLD-MCNC: 11.3 G/DL — LOW (ref 13–17)
INR BLD: 1.25 — HIGH (ref 0.88–1.16)
MAGNESIUM SERPL-MCNC: 2.2 MG/DL — SIGNIFICANT CHANGE UP (ref 1.6–2.6)
MCHC RBC-ENTMCNC: 25.2 PG — LOW (ref 27–34)
MCHC RBC-ENTMCNC: 32.4 G/DL — SIGNIFICANT CHANGE UP (ref 32–36)
MCV RBC AUTO: 77.7 FL — LOW (ref 80–100)
PHOSPHATE SERPL-MCNC: 2.8 MG/DL — SIGNIFICANT CHANGE UP (ref 2.5–4.5)
PLATELET # BLD AUTO: 212 K/UL — SIGNIFICANT CHANGE UP (ref 150–400)
POTASSIUM SERPL-MCNC: 4.7 MMOL/L — SIGNIFICANT CHANGE UP (ref 3.5–5.3)
POTASSIUM SERPL-SCNC: 4.7 MMOL/L — SIGNIFICANT CHANGE UP (ref 3.5–5.3)
PROTHROM AB SERPL-ACNC: 13.9 SEC — HIGH (ref 9.8–12.7)
RBC # BLD: 4.49 M/UL — SIGNIFICANT CHANGE UP (ref 4.2–5.8)
RBC # FLD: 18.7 % — HIGH (ref 10.3–16.9)
SODIUM SERPL-SCNC: 135 MMOL/L — SIGNIFICANT CHANGE UP (ref 135–145)
WBC # BLD: 13.3 K/UL — HIGH (ref 3.8–10.5)
WBC # FLD AUTO: 13.3 K/UL — HIGH (ref 3.8–10.5)

## 2018-06-04 PROCEDURE — 71046 X-RAY EXAM CHEST 2 VIEWS: CPT | Mod: 26

## 2018-06-04 PROCEDURE — 71045 X-RAY EXAM CHEST 1 VIEW: CPT | Mod: 26,59

## 2018-06-04 PROCEDURE — 99232 SBSQ HOSP IP/OBS MODERATE 35: CPT

## 2018-06-04 RX ORDER — FAMOTIDINE 10 MG/ML
20 INJECTION INTRAVENOUS
Qty: 0 | Refills: 0 | Status: DISCONTINUED | OUTPATIENT
Start: 2018-06-04 | End: 2018-06-05

## 2018-06-04 RX ORDER — POTASSIUM CHLORIDE 20 MEQ
20 PACKET (EA) ORAL DAILY
Qty: 0 | Refills: 0 | Status: DISCONTINUED | OUTPATIENT
Start: 2018-06-04 | End: 2018-06-05

## 2018-06-04 RX ADMIN — Medication 100 MILLIGRAM(S): at 06:32

## 2018-06-04 RX ADMIN — HEPARIN SODIUM 5000 UNIT(S): 5000 INJECTION INTRAVENOUS; SUBCUTANEOUS at 21:18

## 2018-06-04 RX ADMIN — GABAPENTIN 100 MILLIGRAM(S): 400 CAPSULE ORAL at 14:02

## 2018-06-04 RX ADMIN — Medication 50 MILLIGRAM(S): at 21:18

## 2018-06-04 RX ADMIN — HEPARIN SODIUM 5000 UNIT(S): 5000 INJECTION INTRAVENOUS; SUBCUTANEOUS at 06:31

## 2018-06-04 RX ADMIN — OXYCODONE AND ACETAMINOPHEN 1 TABLET(S): 5; 325 TABLET ORAL at 06:37

## 2018-06-04 RX ADMIN — OXYCODONE AND ACETAMINOPHEN 1 TABLET(S): 5; 325 TABLET ORAL at 07:30

## 2018-06-04 RX ADMIN — LIDOCAINE 1 PATCH: 4 CREAM TOPICAL at 00:25

## 2018-06-04 RX ADMIN — DULOXETINE HYDROCHLORIDE 90 MILLIGRAM(S): 30 CAPSULE, DELAYED RELEASE ORAL at 12:00

## 2018-06-04 RX ADMIN — Medication 50 MILLIGRAM(S): at 06:31

## 2018-06-04 RX ADMIN — ATORVASTATIN CALCIUM 40 MILLIGRAM(S): 80 TABLET, FILM COATED ORAL at 21:19

## 2018-06-04 RX ADMIN — OXYCODONE AND ACETAMINOPHEN 2 TABLET(S): 5; 325 TABLET ORAL at 22:30

## 2018-06-04 RX ADMIN — LIDOCAINE 1 PATCH: 4 CREAM TOPICAL at 11:59

## 2018-06-04 RX ADMIN — HEPARIN SODIUM 5000 UNIT(S): 5000 INJECTION INTRAVENOUS; SUBCUTANEOUS at 14:03

## 2018-06-04 RX ADMIN — Medication 40 MILLIGRAM(S): at 14:02

## 2018-06-04 RX ADMIN — FAMOTIDINE 20 MILLIGRAM(S): 10 INJECTION INTRAVENOUS at 17:14

## 2018-06-04 RX ADMIN — GABAPENTIN 100 MILLIGRAM(S): 400 CAPSULE ORAL at 06:31

## 2018-06-04 RX ADMIN — OXYCODONE AND ACETAMINOPHEN 2 TABLET(S): 5; 325 TABLET ORAL at 22:02

## 2018-06-04 RX ADMIN — GABAPENTIN 100 MILLIGRAM(S): 400 CAPSULE ORAL at 21:19

## 2018-06-04 RX ADMIN — LIDOCAINE 1 PATCH: 4 CREAM TOPICAL at 22:04

## 2018-06-04 RX ADMIN — Medication 40 MILLIGRAM(S): at 06:31

## 2018-06-04 RX ADMIN — Medication 100 MILLIGRAM(S): at 21:19

## 2018-06-04 RX ADMIN — Medication 50 MILLIGRAM(S): at 14:03

## 2018-06-04 RX ADMIN — CLOPIDOGREL BISULFATE 75 MILLIGRAM(S): 75 TABLET, FILM COATED ORAL at 11:59

## 2018-06-04 RX ADMIN — Medication 50 MILLIGRAM(S): at 14:02

## 2018-06-04 RX ADMIN — Medication 50 MILLIGRAM(S): at 06:32

## 2018-06-04 RX ADMIN — Medication 81 MILLIGRAM(S): at 11:59

## 2018-06-04 NOTE — PROGRESS NOTE ADULT - SUBJECTIVE AND OBJECTIVE BOX
CTICU TO [facility] TRANSFER NOTE    Operation / Date: MIDCAB on 6/1/18    SUBJECTIVE ASSESSMENT:  62y Male seen at bedside after transfer from CTICU.  He states that he feels well today, ambulated from CTICU without difficulty and has had not acute events overnight.  Denies HA, AMS, CP, palpitations, SOB, cough, hemoptysis, n/v/d, fever.     Vital Signs Last 24 Hrs  T(C): 35.9 (04 Jun 2018 14:00), Max: 36.6 (03 Jun 2018 17:06)  T(F): 96.6 (04 Jun 2018 14:00), Max: 97.9 (03 Jun 2018 21:39)  HR: 96 (04 Jun 2018 14:01) (66 - 96)  BP: 129/78 (04 Jun 2018 14:01) (103/55 - 134/61)  BP(mean): 112 (04 Jun 2018 14:01) (65 - 112)  RR: 16 (04 Jun 2018 14:01) (16 - 31)  SpO2: 95% (04 Jun 2018 14:01) (88% - 97%)  I&O's Detail    03 Jun 2018 07:01  -  04 Jun 2018 07:00  --------------------------------------------------------  IN:    insulin Infusion: 46.5 mL    Oral Fluid: 975 mL    sodium chloride 0.9%: 130 mL  Total IN: 1151.5 mL    OUT:    Voided: 1495 mL  Total OUT: 1495 mL    Total NET: -343.5 mL      04 Jun 2018 07:01  -  04 Jun 2018 14:26  --------------------------------------------------------  IN:    Oral Fluid: 240 mL  Total IN: 240 mL    OUT:    Voided: 300 mL  Total OUT: 300 mL    Total NET: -60 mL    CHEST TUBE:  No. AIR LEAKS: Yes/No. Suction / H2O SEAL.   BLADIMIR DRAIN:  No.  EPICARDIAL WIRES: No.  TIE DOWNS: Yes.  CASTILLO: No.    PHYSICAL EXAM:  General: Resting in bed, no acute distress.   Neurological: AAOx3, no AMS or focal deficits.   Cardiovascular: RRR, S1/S2, no m/r/g.   Respiratory: No acute distress on RA.  Diminished BS at left base, no w/r/r.   Gastrointestinal: ND, NBS, non-TTP.  Extremities: Warm and well perfused, no calf ttp or edema b/l.   Vascular: Pulses 2+  Incision Sites: MIDCAB: C/D/I.      LABS:                        11.3   13.3  )-----------( 212      ( 04 Jun 2018 02:46 )             34.9       COUMADIN:  No.   PT/INR - ( 04 Jun 2018 02:46 )   PT: 13.9 sec;   INR: 1.25          PTT - ( 04 Jun 2018 02:46 )  PTT:25.7 sec    06-04    135  |  95<L>  |  29<H>  ----------------------------<  256<H>  4.7   |  28  |  1.19    Ca    8.8      04 Jun 2018 02:46  Phos  2.8     06-04  Mg     2.2     06-04    MEDICATIONS  (STANDING):  aspirin enteric coated 81 milliGRAM(s) Oral daily  atorvastatin 40 milliGRAM(s) Oral at bedtime  clopidogrel Tablet 75 milliGRAM(s) Oral daily  dextrose 50% Injectable 50 milliLiter(s) IV Push every 15 minutes  dextrose 50% Injectable 25 milliLiter(s) IV Push every 15 minutes  docusate sodium 100 milliGRAM(s) Oral three times a day  DULoxetine 90 milliGRAM(s) Oral daily  furosemide    Tablet 40 milliGRAM(s) Oral <User Schedule>  gabapentin 100 milliGRAM(s) Oral every 8 hours  heparin  Injectable 5000 Unit(s) SubCutaneous every 8 hours  hydrALAZINE 50 milliGRAM(s) Oral every 8 hours  insulin lispro (HumaLOG) corrective regimen sliding scale   SubCutaneous Before meals and at bedtime  lidocaine   Patch 1 Patch Transdermal daily  metoprolol tartrate 50 milliGRAM(s) Oral three times a day  potassium chloride    Tablet ER 20 milliEquivalent(s) Oral daily  senna 2 Tablet(s) Oral at bedtime    MEDICATIONS  (PRN):  acetaminophen   Tablet 650 milliGRAM(s) Oral every 6 hours PRN mild pain  oxyCODONE    5 mG/acetaminophen 325 mG 2 Tablet(s) Oral every 4 hours PRN Severe Pain (7 - 10)  oxyCODONE    5 mG/acetaminophen 325 mG 1 Tablet(s) Oral every 6 hours PRN Moderate Pain (4 - 6)      RADIOLOGY & ADDITIONAL TESTS:  < from: Xray Chest 2 Views PA/Lat (06.04.18 @ 11:25) >    EXAM:  XR CHEST PA LAT 2V                          PROCEDURE DATE:  06/04/2018          INTERPRETATION:  Chest 2 views    History: Postop follow-up exam    Limited exam secondary to technical factors. The left lower lung   infiltrate/focal atelectasis and/or chronic changes are grossly similar   to prior exam earlier same day. No acute infiltrates appearing. Right   venous catheter tip in region of SVC again noted.    < end of copied text >

## 2018-06-04 NOTE — PROGRESS NOTE ADULT - SUBJECTIVE AND OBJECTIVE BOX
INTERVAL HISTORY:  Incisional chest pain	  Chart, PMH and allergies reviewed  MEDICATIONS:  furosemide    Tablet 40 milliGRAM(s) Oral <User Schedule>  hydrALAZINE 50 milliGRAM(s) Oral every 8 hours  metoprolol tartrate 50 milliGRAM(s) Oral three times a day        acetaminophen   Tablet 650 milliGRAM(s) Oral every 6 hours PRN  DULoxetine 90 milliGRAM(s) Oral daily  gabapentin 100 milliGRAM(s) Oral every 8 hours  oxyCODONE    5 mG/acetaminophen 325 mG 2 Tablet(s) Oral every 4 hours PRN  oxyCODONE    5 mG/acetaminophen 325 mG 1 Tablet(s) Oral every 6 hours PRN    docusate sodium 100 milliGRAM(s) Oral three times a day  senna 2 Tablet(s) Oral at bedtime    atorvastatin 40 milliGRAM(s) Oral at bedtime  dextrose 50% Injectable 50 milliLiter(s) IV Push every 15 minutes  dextrose 50% Injectable 25 milliLiter(s) IV Push every 15 minutes  insulin lispro (HumaLOG) corrective regimen sliding scale   SubCutaneous Before meals and at bedtime    aspirin enteric coated 81 milliGRAM(s) Oral daily  clopidogrel Tablet 75 milliGRAM(s) Oral daily  heparin  Injectable 5000 Unit(s) SubCutaneous every 8 hours  lidocaine   Patch 1 Patch Transdermal daily  potassium chloride    Tablet ER 20 milliEquivalent(s) Oral daily      REVIEW OF SYSTEMS:  CONSTITUTIONAL: No fever, no weight loss, or fatigue  PULMONARY: No cough, no wheezing, chills or hemoptysis; No Shortness of Breath  CARDIOVASCULAR: +chest pain, no palpitations, no syncope, dizziness, no leg swelling  GASTROINTESTINAL: No abdominal pain. No nausea, no  vomiting, or hematemesis; No diarrhea or constipation. No melena or hematochezia.  GENITOURINARY: No dysuria, frequency, hematuria, or incontinence  SKIN: No itching, burning, rashes, or lesions     PHYSICAL EXAM:  T(C): 36.5 (06-04-18 @ 10:18), Max: 36.6 (06-03-18 @ 17:06)  HR: 74 (06-04-18 @ 09:00) (66 - 96)  BP: 107/51 (06-04-18 @ 09:00) (103/55 - 145/66)  RR: 16 (06-04-18 @ 09:00) (16 - 31)  SpO2: 92% (06-04-18 @ 09:00) (88% - 97%)  Wt(kg): --  I&O's Summary    03 Jun 2018 07:01  -  04 Jun 2018 07:00  --------------------------------------------------------  IN: 1151.5 mL / OUT: 1495 mL / NET: -343.5 mL    04 Jun 2018 07:01  -  04 Jun 2018 11:15  --------------------------------------------------------  IN: 0 mL / OUT: 300 mL / NET: -300 mL        Appearance:  No deformities, normal appearance	  HEENT:   Normal oral mucosa, PERRL, EOMI	  Neck: No jvd , no masses  Lymphatic: No  lymphadenopathy  Pulmonary: Lungs clear to auscultation and percussion  Cardiovascular: Normal S1 S2, No JVD, No murmur, No edema	  Abdomen:  Soft, Non-tender, + BS  Skin: No rashes, No ecchymoses	  Extremities:  No clubbing or cyanosis  MSK: Normal range of motion, no joint swelling    LABS:		  CARDIAC MARKERS:                         11.3   13.3  )-----------( 212      ( 04 Jun 2018 02:46 )             34.9     06-04    135  |  95<L>  |  29<H>  ----------------------------<  256<H>  4.7   |  28  |  1.19    Ca    8.8      04 Jun 2018 02:46  Phos  2.8     06-04  Mg     2.2     06-04      proBNP:  Lipid Profile:  HgA1c:  TSH:  PT/INR - ( 04 Jun 2018 02:46 )   PT: 13.9 sec;   INR: 1.25          PTT - ( 04 Jun 2018 02:46 )  PTT:25.7 sec  TELEMETRY: 	           ECG:  	            RADIOLOGY: chest L hazy  DIAGNOSTIC TESTING: [ ] Echocardiogram: [ ]  Catheterization: [ ] Stress Test:      ASSESSMENT/PLAN: 	    Coronary artery disease-status post MIDCAB. The patient has no angina. He has incisional chest pain which is tolerable.    Possible left effusion-patient will get sonogram.    Diabetes-follow sugars and cover with insulin.    Congestive heart failure-no dyspnea. Possible left pleural effusion. Continue Lasix.

## 2018-06-04 NOTE — PROGRESS NOTE ADULT - ASSESSMENT
62 year old male with history of HTN, HLD, hyperkalemia, chronic CHFrEF, EF 25%, IDDM (insulin pump), depression, and CAD s/p PCI in 2013 presented to his cardiologist for pre-op clearance for right total knee replacement.  NST on 8/17/17 demonstrated abnormal myocardial perfusion c/w scarring of anterior and anterolateral walls and mild stress-induced ischemia at the margins of the defect and global hypokinesis with decreased EF.  ECHO in 11/2017 showed apical hypokinesis and EF 25-30% with cardiac cath on 5/24/18 showing 99% oLAD, 100% D1 ISR with L-L collaterals, 30% OM1, 75% dRCA and EF 20%.  He underwent cardiac viability study on 5/25/18 which showed fixed perfusion defect involving the anterior wall and extending into anetrolateral segments but with the remainder of his myocardium viable.  He completed the remainder of his pre-surgical testing and on 6/1/18 underwent uncomplicated robotic MIDCAB with Dr. Nuno.  He arrived to CTICU in stable condition, extubated on POD 0 and was restarted on home meds POD 1.  The remainder of his ICU course remained uncomplicated and he was transferred to floor care on POD 3.     Neurovascular: No delirium, pain well managed on current regimen  -C/w PRNs for Pain control  -Monitor neuro status    Respiratory: Saturates well on room air  -CXR this AM indicative of left pleural effusion vs. atelectasis.  Approximately 100cc fluid on bedside US, no indication for intervention at this time.  Encouraged IS and ambulation, PA/Lat in AM.   -Encourage IS 10x/hour while awake, Cough and deep breathing exercises  -Monitor respiratory status via SpO2    Cardiovascular: POD 3 s/p MIDCAB, sCHF with EF 25%, history of HTN, HLD  -C/w ASA, Plavix, Statin, BB (titrate as tolerated), and hydralazine 50mg q8h.    -C/w Lasix 40mg PO BID with K+ for afterload reduction and diuresis  -Monitor HR/BP/Tele    GI: Tolerating PO  -Prophylaxis: Pepcid  -C/w bowel regimen    /Renal:   -BUN/Cr: 29/1.19  -Trend Cr on AM labs  -Replete electrolytes as needed    ID: Afebrile, asymptomatic  -WCC: 13.3  -Continue to monitor for SIRS/Sepsis syndrome while inpatient    Endo:  -A1C: 8.5; hx of IDDM with home insulin pump installed and functional.  Pt manages home glucose, trend FS with ISS for further coverage if needed.   -TSH: 2.007    Heme:   -H/H: 11.3/34.9  -CBC, chem in AM  -DVT ppx: HSQ 5000 u q8h and SCDs    Disposition: Home tomorrow.

## 2018-06-05 ENCOUNTER — TRANSCRIPTION ENCOUNTER (OUTPATIENT)
Age: 63
End: 2018-06-05

## 2018-06-05 VITALS — TEMPERATURE: 97 F

## 2018-06-05 LAB
ALBUMIN SERPL ELPH-MCNC: 3.5 G/DL — SIGNIFICANT CHANGE UP (ref 3.3–5)
ALP SERPL-CCNC: 84 U/L — SIGNIFICANT CHANGE UP (ref 40–120)
ALT FLD-CCNC: 41 U/L — SIGNIFICANT CHANGE UP (ref 10–45)
ANION GAP SERPL CALC-SCNC: 12 MMOL/L — SIGNIFICANT CHANGE UP (ref 5–17)
AST SERPL-CCNC: 37 U/L — SIGNIFICANT CHANGE UP (ref 10–40)
BILIRUB SERPL-MCNC: 1.1 MG/DL — SIGNIFICANT CHANGE UP (ref 0.2–1.2)
BUN SERPL-MCNC: 27 MG/DL — HIGH (ref 7–23)
CALCIUM SERPL-MCNC: 9.3 MG/DL — SIGNIFICANT CHANGE UP (ref 8.4–10.5)
CHLORIDE SERPL-SCNC: 95 MMOL/L — LOW (ref 96–108)
CO2 SERPL-SCNC: 28 MMOL/L — SIGNIFICANT CHANGE UP (ref 22–31)
CREAT SERPL-MCNC: 1.07 MG/DL — SIGNIFICANT CHANGE UP (ref 0.5–1.3)
GLUCOSE BLDC GLUCOMTR-MCNC: 121 MG/DL — HIGH (ref 70–99)
GLUCOSE SERPL-MCNC: 111 MG/DL — HIGH (ref 70–99)
HCT VFR BLD CALC: 37.6 % — LOW (ref 39–50)
HGB BLD-MCNC: 12 G/DL — LOW (ref 13–17)
MAGNESIUM SERPL-MCNC: 2 MG/DL — SIGNIFICANT CHANGE UP (ref 1.6–2.6)
MCHC RBC-ENTMCNC: 24.5 PG — LOW (ref 27–34)
MCHC RBC-ENTMCNC: 31.9 G/DL — LOW (ref 32–36)
MCV RBC AUTO: 76.9 FL — LOW (ref 80–100)
PLATELET # BLD AUTO: 274 K/UL — SIGNIFICANT CHANGE UP (ref 150–400)
POTASSIUM SERPL-MCNC: 4.1 MMOL/L — SIGNIFICANT CHANGE UP (ref 3.5–5.3)
POTASSIUM SERPL-SCNC: 4.1 MMOL/L — SIGNIFICANT CHANGE UP (ref 3.5–5.3)
PROT SERPL-MCNC: 7 G/DL — SIGNIFICANT CHANGE UP (ref 6–8.3)
RBC # BLD: 4.89 M/UL — SIGNIFICANT CHANGE UP (ref 4.2–5.8)
RBC # FLD: 18.7 % — HIGH (ref 10.3–16.9)
SODIUM SERPL-SCNC: 135 MMOL/L — SIGNIFICANT CHANGE UP (ref 135–145)
WBC # BLD: 10.4 K/UL — SIGNIFICANT CHANGE UP (ref 3.8–10.5)
WBC # FLD AUTO: 10.4 K/UL — SIGNIFICANT CHANGE UP (ref 3.8–10.5)

## 2018-06-05 PROCEDURE — 80048 BASIC METABOLIC PNL TOTAL CA: CPT

## 2018-06-05 PROCEDURE — 85610 PROTHROMBIN TIME: CPT

## 2018-06-05 PROCEDURE — 82330 ASSAY OF CALCIUM: CPT

## 2018-06-05 PROCEDURE — 80053 COMPREHEN METABOLIC PANEL: CPT

## 2018-06-05 PROCEDURE — 84132 ASSAY OF SERUM POTASSIUM: CPT

## 2018-06-05 PROCEDURE — 82962 GLUCOSE BLOOD TEST: CPT

## 2018-06-05 PROCEDURE — 84295 ASSAY OF SERUM SODIUM: CPT

## 2018-06-05 PROCEDURE — S2900: CPT

## 2018-06-05 PROCEDURE — 71046 X-RAY EXAM CHEST 2 VIEWS: CPT

## 2018-06-05 PROCEDURE — 86850 RBC ANTIBODY SCREEN: CPT

## 2018-06-05 PROCEDURE — 86900 BLOOD TYPING SEROLOGIC ABO: CPT

## 2018-06-05 PROCEDURE — C1768: CPT

## 2018-06-05 PROCEDURE — 85025 COMPLETE CBC W/AUTO DIFF WBC: CPT

## 2018-06-05 PROCEDURE — C1889: CPT

## 2018-06-05 PROCEDURE — 85730 THROMBOPLASTIN TIME PARTIAL: CPT

## 2018-06-05 PROCEDURE — C9248: CPT

## 2018-06-05 PROCEDURE — 94002 VENT MGMT INPAT INIT DAY: CPT

## 2018-06-05 PROCEDURE — 85027 COMPLETE CBC AUTOMATED: CPT

## 2018-06-05 PROCEDURE — 36415 COLL VENOUS BLD VENIPUNCTURE: CPT

## 2018-06-05 PROCEDURE — 83735 ASSAY OF MAGNESIUM: CPT

## 2018-06-05 PROCEDURE — 82803 BLOOD GASES ANY COMBINATION: CPT

## 2018-06-05 PROCEDURE — 84100 ASSAY OF PHOSPHORUS: CPT

## 2018-06-05 PROCEDURE — 86923 COMPATIBILITY TEST ELECTRIC: CPT

## 2018-06-05 PROCEDURE — 86901 BLOOD TYPING SEROLOGIC RH(D): CPT

## 2018-06-05 PROCEDURE — 71045 X-RAY EXAM CHEST 1 VIEW: CPT

## 2018-06-05 PROCEDURE — 99239 HOSP IP/OBS DSCHRG MGMT >30: CPT

## 2018-06-05 PROCEDURE — 97161 PT EVAL LOW COMPLEX 20 MIN: CPT

## 2018-06-05 PROCEDURE — P9045: CPT

## 2018-06-05 RX ORDER — CLOPIDOGREL BISULFATE 75 MG/1
1 TABLET, FILM COATED ORAL
Qty: 30 | Refills: 0 | OUTPATIENT
Start: 2018-06-05 | End: 2018-07-04

## 2018-06-05 RX ORDER — IBUPROFEN 200 MG
0 TABLET ORAL
Qty: 0 | Refills: 0 | COMMUNITY

## 2018-06-05 RX ORDER — EZETIMIBE 10 MG/1
1 TABLET ORAL
Qty: 0 | Refills: 0 | COMMUNITY

## 2018-06-05 RX ORDER — HYDRALAZINE HCL 50 MG
1 TABLET ORAL
Qty: 0 | Refills: 0 | COMMUNITY

## 2018-06-05 RX ORDER — HYDRALAZINE HCL 50 MG
1 TABLET ORAL
Qty: 90 | Refills: 0 | OUTPATIENT
Start: 2018-06-05 | End: 2018-07-04

## 2018-06-05 RX ORDER — METOPROLOL TARTRATE 50 MG
0 TABLET ORAL
Qty: 0 | Refills: 0 | COMMUNITY

## 2018-06-05 RX ORDER — FAMOTIDINE 10 MG/ML
1 INJECTION INTRAVENOUS
Qty: 60 | Refills: 0 | OUTPATIENT
Start: 2018-06-05 | End: 2018-07-04

## 2018-06-05 RX ORDER — EZETIMIBE 10 MG/1
1 TABLET ORAL
Qty: 30 | Refills: 0
Start: 2018-06-05 | End: 2018-07-04

## 2018-06-05 RX ORDER — DOCUSATE SODIUM 100 MG
1 CAPSULE ORAL
Qty: 90 | Refills: 0 | OUTPATIENT
Start: 2018-06-05 | End: 2018-07-04

## 2018-06-05 RX ORDER — METOPROLOL TARTRATE 50 MG
1 TABLET ORAL
Qty: 60 | Refills: 0 | OUTPATIENT
Start: 2018-06-05 | End: 2018-07-04

## 2018-06-05 RX ORDER — ISOSORBIDE MONONITRATE 60 MG/1
1 TABLET, EXTENDED RELEASE ORAL
Qty: 0 | Refills: 0 | COMMUNITY

## 2018-06-05 RX ORDER — POTASSIUM CHLORIDE 20 MEQ
1 PACKET (EA) ORAL
Qty: 30 | Refills: 0 | OUTPATIENT
Start: 2018-06-05 | End: 2018-07-04

## 2018-06-05 RX ORDER — FUROSEMIDE 40 MG
1 TABLET ORAL
Qty: 60 | Refills: 0 | OUTPATIENT
Start: 2018-06-05 | End: 2018-07-04

## 2018-06-05 RX ORDER — ROSUVASTATIN CALCIUM 5 MG/1
1 TABLET ORAL
Qty: 30 | Refills: 0 | OUTPATIENT
Start: 2018-06-05 | End: 2018-07-04

## 2018-06-05 RX ORDER — ASPIRIN/CALCIUM CARB/MAGNESIUM 324 MG
1 TABLET ORAL
Qty: 30 | Refills: 0 | OUTPATIENT
Start: 2018-06-05 | End: 2018-07-04

## 2018-06-05 RX ORDER — PATIROMER 8.4 G/1
1 POWDER, FOR SUSPENSION ORAL
Qty: 0 | Refills: 0 | COMMUNITY

## 2018-06-05 RX ORDER — INSULIN LISPRO 100/ML
0 VIAL (ML) SUBCUTANEOUS
Qty: 0 | Refills: 0 | COMMUNITY

## 2018-06-05 RX ORDER — ACETAMINOPHEN 500 MG
2 TABLET ORAL
Qty: 240 | Refills: 0 | OUTPATIENT
Start: 2018-06-05 | End: 2018-07-04

## 2018-06-05 RX ORDER — ROSUVASTATIN CALCIUM 5 MG/1
1 TABLET ORAL
Qty: 0 | Refills: 0 | COMMUNITY

## 2018-06-05 RX ORDER — ASPIRIN/CALCIUM CARB/MAGNESIUM 324 MG
1 TABLET ORAL
Qty: 0 | Refills: 0 | COMMUNITY

## 2018-06-05 RX ORDER — GABAPENTIN 400 MG/1
1 CAPSULE ORAL
Qty: 90 | Refills: 0 | OUTPATIENT
Start: 2018-06-05 | End: 2018-07-04

## 2018-06-05 RX ORDER — SENNA PLUS 8.6 MG/1
2 TABLET ORAL
Qty: 60 | Refills: 0 | OUTPATIENT
Start: 2018-06-05 | End: 2018-07-04

## 2018-06-05 RX ADMIN — Medication 40 MILLIGRAM(S): at 06:42

## 2018-06-05 RX ADMIN — GABAPENTIN 100 MILLIGRAM(S): 400 CAPSULE ORAL at 06:42

## 2018-06-05 RX ADMIN — Medication 50 MILLIGRAM(S): at 06:42

## 2018-06-05 RX ADMIN — Medication 100 MILLIGRAM(S): at 06:42

## 2018-06-05 RX ADMIN — FAMOTIDINE 20 MILLIGRAM(S): 10 INJECTION INTRAVENOUS at 06:42

## 2018-06-05 RX ADMIN — HEPARIN SODIUM 5000 UNIT(S): 5000 INJECTION INTRAVENOUS; SUBCUTANEOUS at 06:42

## 2018-06-05 NOTE — PROGRESS NOTE ADULT - SUBJECTIVE AND OBJECTIVE BOX
INTERVAL HISTORY:  Mild incisional pain	  Chart, PMH and allergies reviewed  MEDICATIONS:  furosemide    Tablet 40 milliGRAM(s) Oral <User Schedule>  hydrALAZINE 50 milliGRAM(s) Oral every 8 hours  metoprolol tartrate 50 milliGRAM(s) Oral three times a day        acetaminophen   Tablet 650 milliGRAM(s) Oral every 6 hours PRN  DULoxetine 90 milliGRAM(s) Oral daily  gabapentin 100 milliGRAM(s) Oral every 8 hours  oxyCODONE    5 mG/acetaminophen 325 mG 2 Tablet(s) Oral every 4 hours PRN  oxyCODONE    5 mG/acetaminophen 325 mG 1 Tablet(s) Oral every 6 hours PRN    docusate sodium 100 milliGRAM(s) Oral three times a day  famotidine    Tablet 20 milliGRAM(s) Oral two times a day  senna 2 Tablet(s) Oral at bedtime    atorvastatin 40 milliGRAM(s) Oral at bedtime  dextrose 50% Injectable 50 milliLiter(s) IV Push every 15 minutes  dextrose 50% Injectable 25 milliLiter(s) IV Push every 15 minutes  insulin lispro (HumaLOG) corrective regimen sliding scale   SubCutaneous Before meals and at bedtime    aspirin enteric coated 81 milliGRAM(s) Oral daily  clopidogrel Tablet 75 milliGRAM(s) Oral daily  heparin  Injectable 5000 Unit(s) SubCutaneous every 8 hours  lidocaine   Patch 1 Patch Transdermal daily  potassium chloride    Tablet ER 20 milliEquivalent(s) Oral daily      REVIEW OF SYSTEMS:  CONSTITUTIONAL: No fever, no weight loss, or fatigue  PULMONARY: No cough, no wheezing, chills or hemoptysis; No Shortness of Breath  CARDIOVASCULAR: +chest pain, no palpitations, no syncope, dizziness, no leg swelling  GASTROINTESTINAL: No abdominal pain. No nausea, no  vomiting, or hematemesis; No diarrhea or constipation. No melena or hematochezia.  GENITOURINARY: No dysuria, frequency, hematuria, or incontinence  SKIN: No itching, burning, rashes, or lesions     PHYSICAL EXAM:  T(C): 36.1 (06-05-18 @ 10:00), Max: 36.8 (06-04-18 @ 22:05)  HR: 62 (06-05-18 @ 08:36) (60 - 82)  BP: 109/54 (06-05-18 @ 08:36) (98/49 - 132/63)  RR: 18 (06-05-18 @ 08:36) (17 - 18)  SpO2: 95% (06-05-18 @ 08:36) (91% - 95%)  Wt(kg): --  I&O's Summary    04 Jun 2018 07:01  -  05 Jun 2018 07:00  --------------------------------------------------------  IN: 755 mL / OUT: 300 mL / NET: 455 mL    05 Jun 2018 07:01  -  05 Jun 2018 16:02  --------------------------------------------------------  IN: 0 mL / OUT: 0 mL / NET: 0 mL        Appearance:  No deformities, normal appearance	  HEENT:   Normal oral mucosa, PERRL, EOMI	  Neck: No jvd , no masses  Lymphatic: No  lymphadenopathy  Pulmonary: Lungs clear to auscultation and percussion  Cardiovascular: Normal S1 S2, No JVD, No murmur, No edema	  Abdomen:  Soft, Non-tender, + BS  Skin: No rashes, No ecchymoses	  Extremities:  No clubbing or cyanosis  MSK: Normal range of motion, no joint swelling    LABS:		  CARDIAC MARKERS:                         12.0   10.4  )-----------( 274      ( 05 Jun 2018 06:52 )             37.6     06-05    135  |  95<L>  |  27<H>  ----------------------------<  111<H>  4.1   |  28  |  1.07    Ca    9.3      05 Jun 2018 06:50  Phos  2.8     06-04  Mg     2.0     06-05    TPro  7.0  /  Alb  3.5  /  TBili  1.1  /  DBili  x   /  AST  37  /  ALT  41  /  AlkPhos  84  06-05    proBNP:  Lipid Profile:  HgA1c:  TSH:  PT/INR - ( 04 Jun 2018 02:46 )   PT: 13.9 sec;   INR: 1.25          PTT - ( 04 Jun 2018 02:46 )  PTT:25.7 sec  TELEMETRY: NSR	           ECG:  	            RADIOLOGY:   DIAGNOSTIC TESTING: [ ] Echocardiogram: [ ]  Catheterization: [ ] Stress Test:      ASSESSMENT/PLAN: 	    Coronary artery disease-status post MIDCAB. The patient has no angina. He has incisional chest pain which is improved.    Diabetes- follow sugars.    Congestive heart failure-no dyspnea.  Continue Lasix.      Discharge planning done over 30 min.

## 2018-06-05 NOTE — PROGRESS NOTE ADULT - SUBJECTIVE AND OBJECTIVE BOX
Patient discussed on morning rounds with Dr. Nuno    Operation / Date: MIDCAB on 6/1/18    Surgeon: Dr. Nuno    Referring Physician: Dr. Campuzano    SUBJECTIVE ASSESSMENT:  62y Male seen at bedside.  No acute issues overnight.  Denies HA, AMS, CP, palpitations, SOB, cough, hemoptysis, n/v/d, fever.  Feels ready to go home today.     Hospital Course:  This is a 62 year old male with history of HTN, HLD, hyperkalemia, chronic CHFrEF, EF 25%, IDDM (insulin pump), depression, and CAD s/p PCI in 2013 presented to his cardiologist for pre-op clearance for right total knee replacement.  NST on 8/17/17 demonstrated abnormal myocardial perfusion c/w scarring of anterior and anterolateral walls and mild stress-induced ischemia at the margins of the defect and global hypokinesis with decreased EF.  ECHO in 11/2017 showed apical hypokinesis and EF 25-30% with cardiac cath on 5/24/18 showing 99% oLAD, 100% D1 ISR with L-L collaterals, 30% OM1, 75% dRCA and EF 20%.  He underwent cardiac viability study on 5/25/18 which showed fixed perfusion defect involving the anterior wall and extending into anterolateral segments but with the remainder of his myocardium viable.  He completed the remainder of his pre-surgical testing and on 6/1/18 underwent uncomplicated robotic MIDCAB with Dr. Nuno.  He arrived to CTICU in stable condition, extubated on POD 0 and was restarted on home meds POD 1.  The remainder of his ICU course remained uncomplicated and he was transferred to floor care on POD 3. He continued to progress well, ambulates on room air without difficulty and on POD 4 was medically cleared by Dr. Nuno for discharge to home today with plan for outpatient follow-up.  Over 30 minutes was spent with the patient reviewing the discharge material including medications, follow up appointments, recovery, concerning symptoms, and how to contact their health care providers if they have questions.     Vital Signs Last 24 Hrs  T(C): 36.6 (05 Jun 2018 05:00), Max: 36.8 (04 Jun 2018 22:05)  T(F): 97.8 (05 Jun 2018 05:00), Max: 98.2 (04 Jun 2018 22:05)  HR: 62 (05 Jun 2018 08:36) (60 - 96)  BP: 109/54 (05 Jun 2018 08:36) (98/49 - 132/63)  BP(mean): 69 (05 Jun 2018 08:36) (63 - 112)  RR: 18 (05 Jun 2018 08:36) (16 - 18)  SpO2: 95% (05 Jun 2018 08:36) (91% - 95%)  I&O's Detail    04 Jun 2018 07:01  -  05 Jun 2018 07:00  --------------------------------------------------------  IN:    Oral Fluid: 755 mL  Total IN: 755 mL    OUT:    Voided: 300 mL  Total OUT: 300 mL    Total NET: 455 mL      05 Jun 2018 07:01  -  05 Jun 2018 09:41  --------------------------------------------------------  IN:  Total IN: 0 mL    OUT:  Total OUT: 0 mL    Total NET: 0 mL      EPICARDIAL WIRES REMOVED: NA  TIE DOWNS REMOVED: Yes.    PHYSICAL EXAM:  General: Resting in bed, no acute distress.   Neurological: AAOx3, no AMS or focal deficits.   Cardiovascular: RRR, S1/S2, no m/r/g.   Respiratory: No acute distress on RA.  Diminished BS at left base, no w/r/r.   Gastrointestinal: ND, NBS, non-TTP.  Extremities: Warm and well perfused, no calf ttp or edema b/l.   Vascular: Pulses 2+  Incision Sites: MIDCAB: C/D/I.        LABS:                        12.0   10.4  )-----------( 274      ( 05 Jun 2018 06:52 )             37.6       COUMADIN:  No    PT/INR - ( 04 Jun 2018 02:46 )   PT: 13.9 sec;   INR: 1.25          PTT - ( 04 Jun 2018 02:46 )  PTT:25.7 sec    06-05    135  |  95<L>  |  27<H>  ----------------------------<  111<H>  4.1   |  28  |  1.07    Ca    9.3      05 Jun 2018 06:50  Phos  2.8     06-04  Mg     2.0     06-05    TPro  7.0  /  Alb  3.5  /  TBili  1.1  /  DBili  x   /  AST  37  /  ALT  41  /  AlkPhos  84  06-05    MEDICATIONS  (STANDING):  I will START or STAY ON the medications listed below when I get home from the hospital:    aspirin 81 mg oral delayed release tablet  -- 1 tab(s) by mouth once a day  -- Indication: For Blood thinner    acetaminophen 325 mg oral tablet  -- 2 tab(s) by mouth every 6 hours, As needed, mild pain MDD:8  -- Indication: For Pain    oxyCODONE-acetaminophen 5 mg-325 mg oral tablet  -- 1 tab(s) by mouth every 6 hours, As Needed -Severe Pain (7 - 10) MDD:4  -- Indication: For Severe pain    gabapentin 100 mg oral capsule  -- 1 cap(s) by mouth every 8 hours  -- Indication: For Nerve pain    Cymbalta  -- 90 milligram(s) by mouth once a day  -- Indication: For Depression    Crestor 10 mg oral tablet  -- 1 tab(s) by mouth once a day (at bedtime)  -- Indication: For Cholesterol    Zetia 10 mg oral tablet  -- 1 tab(s) by mouth once a day  -- Indication: For Cholesterol    clopidogrel 75 mg oral tablet  -- 1 tab(s) by mouth once a day  -- Indication: For Blood thinner    metoprolol tartrate 37.5 mg oral tablet  -- 1 tab(s) by mouth every 12 hours   -- It is very important that you take or use this exactly as directed.  Do not skip doses or discontinue unless directed by your doctor.  May cause drowsiness.  Alcohol may intensify this effect.  Use care when operating dangerous machinery.  Some non-prescription drugs may aggravate your condition.  Read all labels carefully.  If a warning appears, check with your doctor before taking.  Take with food or milk.  This drug may impair the ability to drive or operate machinery.  Use care until you become familiar with its effects.    -- Indication: For Blood prsesure    furosemide 40 mg oral tablet  -- 1 tab(s) by mouth every 12 hours MDD:2    Take with potassium  -- Indication: For Fluid removal pill, take with potassium    famotidine 20 mg oral tablet  -- 1 tab(s) by mouth 2 times a day  -- Indication: For Stomach helath    docusate sodium 100 mg oral capsule  -- 1 cap(s) by mouth 3 times a day  -- Indication: For Stool softener    senna oral tablet  -- 2 tab(s) by mouth once a day (at bedtime)  -- Indication: For STool softener    potassium chloride 20 mEq oral tablet, extended release  -- 1 tab(s) by mouth once a day    Take only with potassium  -- Indication: For Potassium, take with lasix    Omega-3 oral capsule  -- 1 tab(s) by mouth once a day  -- Indication: For Vitamin    hydrALAZINE 50 mg oral tablet  -- 1 tab(s) by mouth every 8 hours  -- Indication: For Blood pressure.     I will STOP taking the medications listed below when I get home from the hospital:    Metoprolol Succinate  mg oral tablet, extended release  -- orally once a day    Imdur 30 mg oral tablet, extended release  -- 1 tab(s) by mouth once a day (in the morning)    Motrin 800 mg oral tablet  -- orally once a day.     I will SWITCH the dose or number of times a day I take the medications listed below when I get home from the hospital:    furosemide 40 mg oral tablet  -- 1 tab(s) by mouth once a day   -- Avoid prolonged or excessive exposure to direct and/or artificial sunlight while taking this medication.  It is very important that you take or use this exactly as directed.  Do not skip doses or discontinue unless directed by your doctor.  It may be advisable to drink a full glass orange juice or eat a banana daily while taking this medication.    Discharge CXR:  < from: Xray Chest 2 Views PA/Lat (06.04.18 @ 11:25) >    EXAM:  XR CHEST PA LAT 2V                          PROCEDURE DATE:  06/04/2018          INTERPRETATION:  Chest 2 views    History: Postop follow-up exam    Limited exam secondary to technical factors. The left lower lung   infiltrate/focal atelectasis and/or chronic changes are grossly similar   to prior exam earlier same day. No acute infiltrates appearing. Right   venous catheter tip in region of SVC again noted.    < end of copied text >    Discharge ECHO:  < from: TTE Echo w/Cont Complete (05.25.18 @ 11:47) >  Interpretation Summary  Mild to moderate concentric left ventricular hypertrophy.There is   moderate to   severe global hypokinesis of the left ventricle.The left ventricular   ejection   fraction is severely reduced.The left ventricular ejection fraction is   30%.No   LV thrombus seen.The right ventricle is normal in size and function.The   left   atrial size is normal.Right atrial size is normal.No evidence for any   hemodynamically significant valvular disease.There was insufficient TR   detected from which to calculate pulmonary artery systolic pressure.    There is   no pericardial effusion.    < end of copied text >

## 2018-06-05 NOTE — DISCHARGE NOTE ADULT - PLAN OF CARE
Recover from Surgery -Please follow up with  ___on ___.  The office is located at [facility], [facility], 4th floor. Call us with any questions  #[phone].    -Walk daily as tolerated and use your incentive spirometer every hour.    -No driving or strenuous activity/exercise for 6 weeks, or until  cleared by your surgeon.    -Gently clean your incisions with anti-bacterial soap and water, pat  dry.  You may leave them open to air.    -Call your doctor if you have shortness of breath, chest pain not  relieved by pain medication, dizziness, fever >101.5, or increased  redness or drainage from incisions. -Dr. Nuno will follow-up with you within 2 weeks, his office will call you with an appointment.  You can call his office if you have any questions.  The office is located at [facility], [facility], 4th floor. Call us with any questions  #[phone].    -Please follow-up with Dr. Campuzano in 2-4 weeks. His office information and phone number are included below.     -Walk daily as tolerated and use your incentive spirometer every hour.    -No driving or strenuous activity/exercise for 6 weeks, or until  cleared by your surgeon.    -Gently clean your incisions with anti-bacterial soap and water, pat  dry.  You may leave them open to air.    -Call your doctor if you have shortness of breath, chest pain not  relieved by pain medication, dizziness, fever >101.5, or increased  redness or drainage from incisions.

## 2018-06-05 NOTE — DISCHARGE NOTE ADULT - MEDICATION SUMMARY - MEDICATIONS TO CHANGE
I will SWITCH the dose or number of times a day I take the medications listed below when I get home from the hospital:    furosemide 40 mg oral tablet  -- 1 tab(s) by mouth once a day   -- Avoid prolonged or excessive exposure to direct and/or artificial sunlight while taking this medication.  It is very important that you take or use this exactly as directed.  Do not skip doses or discontinue unless directed by your doctor.  It may be advisable to drink a full glass orange juice or eat a banana daily while taking this medication.

## 2018-06-05 NOTE — DISCHARGE NOTE ADULT - CARE PROVIDER_API CALL
Daniel Nuno), Cardiovascular Surgery  [address]  [address]th [place] [address]  Phone: [phone]  Fax: [phone]    Devyn Campuzano (MD), Cardiology; Interventional Cardiology  [address] [address]  Phone: [phone]  Fax: [phone]

## 2018-06-05 NOTE — DISCHARGE NOTE ADULT - HOSPITAL COURSE
This is a 62 year old male with history of HTN, HLD, hyperkalemia, chronic CHFrEF, EF 25%, IDDM (insulin pump), depression, and CAD s/p PCI in 2013 presented to his cardiologist for pre-op clearance for right total knee replacement.  NST on 8/17/17 demonstrated abnormal myocardial perfusion c/w scarring of anterior and anterolateral walls and mild stress-induced ischemia at the margins of the defect and global hypokinesis with decreased EF.  ECHO in 11/2017 showed apical hypokinesis and EF 25-30% with cardiac cath on 5/24/18 showing 99% oLAD, 100% D1 ISR with L-L collaterals, 30% OM1, 75% dRCA and EF 20%.  He underwent cardiac viability study on 5/25/18 which showed fixed perfusion defect involving the anterior wall and extending into anterolateral segments but with the remainder of his myocardium viable.  He completed the remainder of his pre-surgical testing and on 6/1/18 underwent uncomplicated robotic MIDCAB with Dr. Nuno.  He arrived to CTICU in stable condition, extubated on POD 0 and was restarted on home meds POD 1.  The remainder of his ICU course remained uncomplicated and he was transferred to floor care on POD 3. He continued to progress well, ambulates on room air without difficulty and on POD 4 was medically cleared by Dr. Nuno for discharge to home today with plan for outpatient follow-up.  Over 30 minutes was spent with the patient reviewing the discharge material including medications, follow up appointments, recovery, concerning symptoms, and how to contact their health care providers if they have questions This is a 62 year old male with history of HTN, HLD, hyperkalemia, chronic CHFrEF, EF 25%, IDDM (insulin pump), depression, and CAD s/p PCI in 2013 presented to his cardiologist for pre-op clearance for right total knee replacement.  NST on 8/17/17 demonstrated abnormal myocardial perfusion c/w scarring of anterior and anterolateral walls and mild stress-induced ischemia at the margins of the defect and global hypokinesis with decreased EF.  ECHO in 11/2017 showed apical hypokinesis and EF 25-30% with cardiac cath on 5/24/18 showing 99% oLAD, 100% D1 ISR with L-L collaterals, 30% OM1, 75% dRCA and EF 20%.  He underwent cardiac viability study on 5/25/18 which showed fixed perfusion defect involving the anterior wall and extending into anterolateral segments but with the remainder of his myocardium viable.  He completed the remainder of his pre-surgical testing and on 6/1/18 underwent uncomplicated robotic MIDCAB with Dr. Nuno.  He arrived to CTICU in stable condition, extubated on POD 0 and was restarted on home meds POD 1.  The remainder of his ICU course remained uncomplicated and he was transferred to floor care on POD 3. He continued to progress well, ambulates on room air without difficulty and on POD 4 was medically cleared by Dr. Nuno for discharge to home today with plan for outpatient follow-up.  Over 30 minutes was spent with the patient reviewing the discharge material including medications, follow up appointments, recovery, concerning symptoms, and how to contact their health care providers if they have questions.

## 2018-06-05 NOTE — DISCHARGE NOTE ADULT - NS AS ACTIVITY OBS
Showering allowed/Walking-Indoors allowed/Do not drive or operate machinery/No Heavy lifting/straining/Walking-Outdoors allowed/Stairs allowed

## 2018-06-05 NOTE — DISCHARGE NOTE ADULT - MEDICATION SUMMARY - MEDICATIONS TO STOP TAKING
I will STOP taking the medications listed below when I get home from the hospital:    Metoprolol Succinate  mg oral tablet, extended release  -- orally once a day    Imdur 30 mg oral tablet, extended release  -- 1 tab(s) by mouth once a day (in the morning)    Motrin 800 mg oral tablet  -- orally once a day

## 2018-06-05 NOTE — DISCHARGE NOTE ADULT - CARE PROVIDERS DIRECT ADDRESSES
[email or web address]

## 2018-06-05 NOTE — DISCHARGE NOTE ADULT - MEDICATION SUMMARY - MEDICATIONS TO TAKE
I will START or STAY ON the medications listed below when I get home from the hospital:    aspirin 81 mg oral delayed release tablet  -- 1 tab(s) by mouth once a day  -- Indication: For Blood thinner    acetaminophen 325 mg oral tablet  -- 2 tab(s) by mouth every 6 hours, As needed, mild pain MDD:8  -- Indication: For Pain    oxyCODONE-acetaminophen 5 mg-325 mg oral tablet  -- 1 tab(s) by mouth every 6 hours, As Needed -Severe Pain (7 - 10) MDD:4  -- Indication: For Severe pain    gabapentin 100 mg oral capsule  -- 1 cap(s) by mouth every 8 hours  -- Indication: For Nerve pain    Cymbalta  -- 90 milligram(s) by mouth once a day  -- Indication: For Depression    Crestor 10 mg oral tablet  -- 1 tab(s) by mouth once a day (at bedtime)  -- Indication: For Cholesterol    Zetia 10 mg oral tablet  -- 1 tab(s) by mouth once a day  -- Indication: For Cholesterol    clopidogrel 75 mg oral tablet  -- 1 tab(s) by mouth once a day  -- Indication: For Blood thinner    metoprolol tartrate 37.5 mg oral tablet  -- 1 tab(s) by mouth every 12 hours   -- It is very important that you take or use this exactly as directed.  Do not skip doses or discontinue unless directed by your doctor.  May cause drowsiness.  Alcohol may intensify this effect.  Use care when operating dangerous machinery.  Some non-prescription drugs may aggravate your condition.  Read all labels carefully.  If a warning appears, check with your doctor before taking.  Take with food or milk.  This drug may impair the ability to drive or operate machinery.  Use care until you become familiar with its effects.    -- Indication: For Blood prsesure    furosemide 40 mg oral tablet  -- 1 tab(s) by mouth every 12 hours MDD:2    Take with potassium  -- Indication: For Fluid removal pill, take with potassium    famotidine 20 mg oral tablet  -- 1 tab(s) by mouth 2 times a day  -- Indication: For Stomach helath    docusate sodium 100 mg oral capsule  -- 1 cap(s) by mouth 3 times a day  -- Indication: For Stool softener    senna oral tablet  -- 2 tab(s) by mouth once a day (at bedtime)  -- Indication: For STool softener    potassium chloride 20 mEq oral tablet, extended release  -- 1 tab(s) by mouth once a day    Take only with potassium  -- Indication: For Potassium, take with lasix    Omega-3 oral capsule  -- 1 tab(s) by mouth once a day  -- Indication: For Vitamin    hydrALAZINE 50 mg oral tablet  -- 1 tab(s) by mouth every 8 hours  -- Indication: For Blood pressure

## 2018-06-05 NOTE — DISCHARGE NOTE ADULT - PATIENT PORTAL LINK FT
You can access the [facility] Patient Portal, offered by [facility], by registering with the following website: [email or web address]

## 2018-06-05 NOTE — DISCHARGE NOTE ADULT - CARE PLAN
Principal Discharge DX:	CAD (coronary artery disease)  Goal:	Recover from Surgery  Assessment and plan of treatment:	-Please follow up with  ___on ___.  The office is located at [facility], [facility], 4th floor. Call us with any questions  #[phone].    -Walk daily as tolerated and use your incentive spirometer every hour.    -No driving or strenuous activity/exercise for 6 weeks, or until  cleared by your surgeon.    -Gently clean your incisions with anti-bacterial soap and water, pat  dry.  You may leave them open to air.    -Call your doctor if you have shortness of breath, chest pain not  relieved by pain medication, dizziness, fever >101.5, or increased  redness or drainage from incisions. Principal Discharge DX:	CAD (coronary artery disease)  Goal:	Recover from Surgery  Assessment and plan of treatment:	-Dr. Nuno will follow-up with you within 2 weeks, his office will call you with an appointment.  You can call his office if you have any questions.  The office is located at [facility], [facility], 4th floor. Call us with any questions  #[phone].    -Please follow-up with Dr. Campuzano in 2-4 weeks. His office information and phone number are included below.     -Walk daily as tolerated and use your incentive spirometer every hour.    -No driving or strenuous activity/exercise for 6 weeks, or until  cleared by your surgeon.    -Gently clean your incisions with anti-bacterial soap and water, pat  dry.  You may leave them open to air.    -Call your doctor if you have shortness of breath, chest pain not  relieved by pain medication, dizziness, fever >101.5, or increased  redness or drainage from incisions.

## 2018-06-05 NOTE — PROGRESS NOTE ADULT - ASSESSMENT
-Dr. Nuno will follow-up with you within 2 weeks, his office will call you with an appointment.  You can call his office if you have any questions.  The office is located at [facility], [facility], 4th floor. Call us with any questions  #[phone].    -Please follow-up with Dr. Campuzano in 2-4 weeks. His office information and phone number are included below.

## 2018-06-07 PROBLEM — R94.5 ABNORMAL LIVER FUNCTION TEST: Status: RESOLVED | Noted: 2017-04-25 | Resolved: 2018-06-07

## 2018-06-07 PROBLEM — Z87.39 HISTORY OF ARTHRITIS: Status: RESOLVED | Noted: 2017-01-12 | Resolved: 2018-06-07

## 2018-06-07 PROBLEM — R09.02 HYPOXIA: Status: RESOLVED | Noted: 2017-07-11 | Resolved: 2018-06-07

## 2018-06-07 PROBLEM — M17.10 KNEE ARTHROPATHY: Status: RESOLVED | Noted: 2018-05-18 | Resolved: 2018-06-07

## 2018-06-07 PROBLEM — Z86.39 HISTORY OF HYPERKALEMIA: Status: RESOLVED | Noted: 2017-07-13 | Resolved: 2018-06-07

## 2018-06-07 PROBLEM — Z86.79 HISTORY OF ESSENTIAL HYPERTENSION: Status: RESOLVED | Noted: 2018-05-18 | Resolved: 2018-06-07

## 2018-06-07 RX ORDER — EZETIMIBE 10 MG/1
10 TABLET ORAL
Refills: 0 | Status: COMPLETED | COMMUNITY
End: 2018-06-07

## 2018-06-07 RX ORDER — METOPROLOL SUCCINATE 100 MG/1
100 TABLET, EXTENDED RELEASE ORAL DAILY
Qty: 90 | Refills: 1 | Status: COMPLETED | COMMUNITY
Start: 2017-07-11 | End: 2018-06-07

## 2018-06-07 RX ORDER — ISOSORBIDE MONONITRATE 30 MG/1
30 TABLET, EXTENDED RELEASE ORAL
Qty: 90 | Refills: 1 | Status: COMPLETED | COMMUNITY
Start: 2017-11-16 | End: 2018-06-07

## 2018-06-07 RX ORDER — DULOXETINE HYDROCHLORIDE 30 MG/1
30 CAPSULE, DELAYED RELEASE PELLETS ORAL DAILY
Qty: 270 | Refills: 1 | Status: COMPLETED | COMMUNITY
Start: 2018-01-16 | End: 2018-06-07

## 2018-06-07 RX ORDER — PATIROMER 8.4 G/1
8.4 POWDER, FOR SUSPENSION ORAL EVERY OTHER DAY
Qty: 36 | Refills: 0 | Status: COMPLETED | COMMUNITY
Start: 2018-01-19 | End: 2018-06-07

## 2018-06-07 RX ORDER — FUROSEMIDE 40 MG/1
40 TABLET ORAL
Qty: 30 | Refills: 2 | Status: COMPLETED | COMMUNITY
Start: 2018-05-25 | End: 2018-06-07

## 2018-06-07 RX ORDER — HYDRALAZINE HYDROCHLORIDE 50 MG/1
50 TABLET ORAL TWICE DAILY
Qty: 180 | Refills: 1 | Status: COMPLETED | COMMUNITY
Start: 2017-11-16 | End: 2018-06-07

## 2018-06-11 ENCOUNTER — FORM ENCOUNTER (OUTPATIENT)
Age: 63
End: 2018-06-11

## 2018-06-11 DIAGNOSIS — E11.42 TYPE 2 DIABETES MELLITUS WITH DIABETIC POLYNEUROPATHY: ICD-10-CM

## 2018-06-11 DIAGNOSIS — J98.11 ATELECTASIS: ICD-10-CM

## 2018-06-11 DIAGNOSIS — I13.0 HYPERTENSIVE HEART AND CHRONIC KIDNEY DISEASE WITH HEART FAILURE AND STAGE 1 THROUGH STAGE 4 CHRONIC KIDNEY DISEASE, OR UNSPECIFIED CHRONIC KIDNEY DISEASE: ICD-10-CM

## 2018-06-11 DIAGNOSIS — J44.9 CHRONIC OBSTRUCTIVE PULMONARY DISEASE, UNSPECIFIED: ICD-10-CM

## 2018-06-11 DIAGNOSIS — I25.118 ATHEROSCLEROTIC HEART DISEASE OF NATIVE CORONARY ARTERY WITH OTHER FORMS OF ANGINA PECTORIS: ICD-10-CM

## 2018-06-11 DIAGNOSIS — Z79.4 LONG TERM (CURRENT) USE OF INSULIN: ICD-10-CM

## 2018-06-11 DIAGNOSIS — E78.5 HYPERLIPIDEMIA, UNSPECIFIED: ICD-10-CM

## 2018-06-11 DIAGNOSIS — I50.22 CHRONIC SYSTOLIC (CONGESTIVE) HEART FAILURE: ICD-10-CM

## 2018-06-11 DIAGNOSIS — Z98.61 CORONARY ANGIOPLASTY STATUS: ICD-10-CM

## 2018-06-11 DIAGNOSIS — F32.9 MAJOR DEPRESSIVE DISORDER, SINGLE EPISODE, UNSPECIFIED: ICD-10-CM

## 2018-06-11 DIAGNOSIS — Z87.891 PERSONAL HISTORY OF NICOTINE DEPENDENCE: ICD-10-CM

## 2018-06-11 DIAGNOSIS — Z96.41 PRESENCE OF INSULIN PUMP (EXTERNAL) (INTERNAL): ICD-10-CM

## 2018-06-11 DIAGNOSIS — Z79.82 LONG TERM (CURRENT) USE OF ASPIRIN: ICD-10-CM

## 2018-06-11 DIAGNOSIS — E11.22 TYPE 2 DIABETES MELLITUS WITH DIABETIC CHRONIC KIDNEY DISEASE: ICD-10-CM

## 2018-06-11 DIAGNOSIS — N18.2 CHRONIC KIDNEY DISEASE, STAGE 2 (MILD): ICD-10-CM

## 2018-06-11 DIAGNOSIS — M17.11 UNILATERAL PRIMARY OSTEOARTHRITIS, RIGHT KNEE: ICD-10-CM

## 2018-06-12 ENCOUNTER — APPOINTMENT (OUTPATIENT)
Dept: CARDIOTHORACIC SURGERY | Facility: CLINIC | Age: 63
End: 2018-06-12
Payer: COMMERCIAL

## 2018-06-12 ENCOUNTER — OUTPATIENT (OUTPATIENT)
Dept: OUTPATIENT SERVICES | Facility: HOSPITAL | Age: 63
LOS: 1 days | End: 2018-06-12
Payer: COMMERCIAL

## 2018-06-12 VITALS
TEMPERATURE: 98.2 F | DIASTOLIC BLOOD PRESSURE: 58 MMHG | RESPIRATION RATE: 18 BRPM | OXYGEN SATURATION: 97 % | HEART RATE: 66 BPM | SYSTOLIC BLOOD PRESSURE: 112 MMHG | BODY MASS INDEX: 34.44 KG/M2 | WEIGHT: 261 LBS

## 2018-06-12 DIAGNOSIS — Z87.442 PERSONAL HISTORY OF URINARY CALCULI: ICD-10-CM

## 2018-06-12 DIAGNOSIS — R09.02 HYPOXEMIA: ICD-10-CM

## 2018-06-12 DIAGNOSIS — R94.5 ABNORMAL RESULTS OF LIVER FUNCTION STUDIES: ICD-10-CM

## 2018-06-12 DIAGNOSIS — L03.039 CELLULITIS OF UNSPECIFIED TOE: ICD-10-CM

## 2018-06-12 DIAGNOSIS — Z86.39 PERSONAL HISTORY OF OTHER ENDOCRINE, NUTRITIONAL AND METABOLIC DISEASE: ICD-10-CM

## 2018-06-12 DIAGNOSIS — M17.10 UNILATERAL PRIMARY OSTEOARTHRITIS, UNSPECIFIED KNEE: ICD-10-CM

## 2018-06-12 DIAGNOSIS — Z87.39 PERSONAL HISTORY OF OTHER DISEASES OF THE MUSCULOSKELETAL SYSTEM AND CONNECTIVE TISSUE: ICD-10-CM

## 2018-06-12 DIAGNOSIS — Z86.79 PERSONAL HISTORY OF OTHER DISEASES OF THE CIRCULATORY SYSTEM: ICD-10-CM

## 2018-06-12 DIAGNOSIS — Z86.59 PERSONAL HISTORY OF OTHER MENTAL AND BEHAVIORAL DISORDERS: ICD-10-CM

## 2018-06-12 PROCEDURE — 71046 X-RAY EXAM CHEST 2 VIEWS: CPT

## 2018-06-12 PROCEDURE — 99024 POSTOP FOLLOW-UP VISIT: CPT

## 2018-06-12 PROCEDURE — 71046 X-RAY EXAM CHEST 2 VIEWS: CPT | Mod: 26

## 2018-06-15 ENCOUNTER — LABORATORY RESULT (OUTPATIENT)
Age: 63
End: 2018-06-15

## 2018-06-15 ENCOUNTER — APPOINTMENT (OUTPATIENT)
Dept: HEART AND VASCULAR | Facility: CLINIC | Age: 63
End: 2018-06-15
Payer: COMMERCIAL

## 2018-06-15 VITALS
HEART RATE: 66 BPM | HEIGHT: 73 IN | WEIGHT: 260 LBS | DIASTOLIC BLOOD PRESSURE: 78 MMHG | BODY MASS INDEX: 34.46 KG/M2 | SYSTOLIC BLOOD PRESSURE: 120 MMHG

## 2018-06-15 PROCEDURE — 36415 COLL VENOUS BLD VENIPUNCTURE: CPT

## 2018-06-15 PROCEDURE — 99214 OFFICE O/P EST MOD 30 MIN: CPT | Mod: 25

## 2018-06-15 PROCEDURE — 93000 ELECTROCARDIOGRAM COMPLETE: CPT

## 2018-06-15 RX ORDER — DOCUSATE SODIUM 100 MG/1
100 CAPSULE, LIQUID FILLED ORAL
Qty: 90 | Refills: 0 | Status: DISCONTINUED | COMMUNITY
Start: 2018-06-05

## 2018-06-15 RX ORDER — GABAPENTIN 100 MG/1
100 CAPSULE ORAL 3 TIMES DAILY
Qty: 90 | Refills: 5 | Status: DISCONTINUED | COMMUNITY
End: 2018-06-15

## 2018-06-15 RX ORDER — METOPROLOL TARTRATE 37.5 MG/1
37.5 TABLET, FILM COATED ORAL
Qty: 60 | Refills: 0 | Status: DISCONTINUED | COMMUNITY
Start: 2018-06-05

## 2018-06-15 RX ORDER — SENNOSIDES 8.6 MG TABLETS 8.6 MG/1
8.6 TABLET ORAL
Qty: 60 | Refills: 0 | Status: DISCONTINUED | COMMUNITY
Start: 2018-06-05

## 2018-06-15 RX ORDER — HYDRALAZINE HYDROCHLORIDE 25 MG/1
25 TABLET ORAL
Qty: 180 | Refills: 0 | Status: DISCONTINUED | COMMUNITY
Start: 2017-11-16

## 2018-06-15 RX ORDER — METOPROLOL TARTRATE 25 MG/1
25 TABLET, FILM COATED ORAL
Qty: 90 | Refills: 0 | Status: DISCONTINUED | COMMUNITY
End: 2018-06-15

## 2018-06-18 LAB
ABO + RH PNL BLD: NORMAL
ANION GAP SERPL CALC-SCNC: 15 MMOL/L
BASOPHILS # BLD AUTO: 0.03 K/UL
BASOPHILS NFR BLD AUTO: 0.2 %
BUN SERPL-MCNC: 33 MG/DL
CALCIUM SERPL-MCNC: 9.4 MG/DL
CHLORIDE SERPL-SCNC: 100 MMOL/L
CO2 SERPL-SCNC: 26 MMOL/L
CREAT SERPL-MCNC: 1.17 MG/DL
EOSINOPHIL # BLD AUTO: 0.34 K/UL
EOSINOPHIL NFR BLD AUTO: 2.7 %
GLUCOSE SERPL-MCNC: 104 MG/DL
HCT VFR BLD CALC: 38.4 %
HGB BLD-MCNC: 11.7 G/DL
IMM GRANULOCYTES NFR BLD AUTO: 0.7 %
LYMPHOCYTES # BLD AUTO: 1.9 K/UL
LYMPHOCYTES NFR BLD AUTO: 15.2 %
MAN DIFF?: NORMAL
MCHC RBC-ENTMCNC: 24.7 PG
MCHC RBC-ENTMCNC: 30.5 GM/DL
MCV RBC AUTO: 81 FL
MONOCYTES # BLD AUTO: 1.35 K/UL
MONOCYTES NFR BLD AUTO: 10.8 %
NEUTROPHILS # BLD AUTO: 8.82 K/UL
NEUTROPHILS NFR BLD AUTO: 70.4 %
NT-PROBNP SERPL-MCNC: 340 PG/ML
PLATELET # BLD AUTO: 368 K/UL
POTASSIUM SERPL-SCNC: 5.1 MMOL/L
RBC # BLD: 4.74 M/UL
RBC # FLD: 19.5 %
SODIUM SERPL-SCNC: 141 MMOL/L
WBC # FLD AUTO: 12.53 K/UL

## 2018-06-19 ENCOUNTER — APPOINTMENT (OUTPATIENT)
Dept: ORTHOPEDIC SURGERY | Facility: CLINIC | Age: 63
End: 2018-06-19
Payer: COMMERCIAL

## 2018-06-19 VITALS — BODY MASS INDEX: 34.46 KG/M2 | HEIGHT: 73 IN | WEIGHT: 260 LBS

## 2018-06-19 PROCEDURE — 20610 DRAIN/INJ JOINT/BURSA W/O US: CPT | Mod: LT

## 2018-06-19 PROCEDURE — 99213 OFFICE O/P EST LOW 20 MIN: CPT | Mod: 25

## 2018-06-19 RX ORDER — OXYCODONE HYDROCHLORIDE AND ACETAMINOPHEN 5; 325 MG/1; MG/1
5-325 TABLET ORAL
Refills: 0 | Status: DISCONTINUED | COMMUNITY
End: 2018-06-19

## 2018-06-25 ENCOUNTER — APPOINTMENT (OUTPATIENT)
Dept: HEART AND VASCULAR | Facility: CLINIC | Age: 63
End: 2018-06-25
Payer: COMMERCIAL

## 2018-06-25 VITALS
WEIGHT: 255 LBS | SYSTOLIC BLOOD PRESSURE: 116 MMHG | DIASTOLIC BLOOD PRESSURE: 57 MMHG | BODY MASS INDEX: 33.8 KG/M2 | HEART RATE: 82 BPM | HEIGHT: 73 IN

## 2018-06-25 PROCEDURE — 99205 OFFICE O/P NEW HI 60 MIN: CPT | Mod: 25

## 2018-06-25 PROCEDURE — 93000 ELECTROCARDIOGRAM COMPLETE: CPT

## 2018-07-10 ENCOUNTER — RX RENEWAL (OUTPATIENT)
Age: 63
End: 2018-07-10

## 2018-07-23 ENCOUNTER — RX RENEWAL (OUTPATIENT)
Age: 63
End: 2018-07-23

## 2018-07-26 ENCOUNTER — APPOINTMENT (OUTPATIENT)
Dept: ORTHOPEDIC SURGERY | Facility: CLINIC | Age: 63
End: 2018-07-26
Payer: COMMERCIAL

## 2018-07-26 PROCEDURE — 20610 DRAIN/INJ JOINT/BURSA W/O US: CPT | Mod: LT

## 2018-07-26 RX ADMIN — Medication 1 MG/2ML: at 00:00

## 2018-07-30 ENCOUNTER — APPOINTMENT (OUTPATIENT)
Dept: INTERNAL MEDICINE | Facility: CLINIC | Age: 63
End: 2018-07-30

## 2018-07-31 ENCOUNTER — APPOINTMENT (OUTPATIENT)
Dept: HEART AND VASCULAR | Facility: CLINIC | Age: 63
End: 2018-07-31

## 2018-07-31 ENCOUNTER — APPOINTMENT (OUTPATIENT)
Dept: ORTHOPEDIC SURGERY | Facility: CLINIC | Age: 63
End: 2018-07-31
Payer: COMMERCIAL

## 2018-07-31 PROCEDURE — 20610 DRAIN/INJ JOINT/BURSA W/O US: CPT | Mod: LT

## 2018-07-31 RX ADMIN — Medication 1 MG/2ML: at 00:00

## 2018-08-07 ENCOUNTER — APPOINTMENT (OUTPATIENT)
Dept: ORTHOPEDIC SURGERY | Facility: CLINIC | Age: 63
End: 2018-08-07
Payer: COMMERCIAL

## 2018-08-07 ENCOUNTER — OUTPATIENT (OUTPATIENT)
Dept: OUTPATIENT SERVICES | Facility: HOSPITAL | Age: 63
LOS: 1 days | End: 2018-08-07
Payer: COMMERCIAL

## 2018-08-07 DIAGNOSIS — M17.12 UNILATERAL PRIMARY OSTEOARTHRITIS, LEFT KNEE: ICD-10-CM

## 2018-08-07 LAB
ANION GAP SERPL CALC-SCNC: 14 MMOL/L — SIGNIFICANT CHANGE UP (ref 5–17)
APPEARANCE UR: CLEAR — SIGNIFICANT CHANGE UP
APTT BLD: 25.8 SEC — LOW (ref 27.5–37.4)
BILIRUB UR-MCNC: NEGATIVE — SIGNIFICANT CHANGE UP
BUN SERPL-MCNC: 27 MG/DL — HIGH (ref 7–23)
CALCIUM SERPL-MCNC: 9.5 MG/DL — SIGNIFICANT CHANGE UP (ref 8.4–10.5)
CHLORIDE SERPL-SCNC: 98 MMOL/L — SIGNIFICANT CHANGE UP (ref 96–108)
CO2 SERPL-SCNC: 28 MMOL/L — SIGNIFICANT CHANGE UP (ref 22–31)
COLOR SPEC: YELLOW — SIGNIFICANT CHANGE UP
CREAT SERPL-MCNC: 1.17 MG/DL — SIGNIFICANT CHANGE UP (ref 0.5–1.3)
DIFF PNL FLD: NEGATIVE — SIGNIFICANT CHANGE UP
GLUCOSE SERPL-MCNC: 167 MG/DL — HIGH (ref 70–99)
GLUCOSE UR QL: 100
HBA1C BLD-MCNC: 9.4 % — HIGH (ref 4–5.6)
HCT VFR BLD CALC: 43.5 % — SIGNIFICANT CHANGE UP (ref 39–50)
HGB BLD-MCNC: 13.3 G/DL — SIGNIFICANT CHANGE UP (ref 13–17)
INR BLD: 1.04 — SIGNIFICANT CHANGE UP (ref 0.88–1.16)
KETONES UR-MCNC: NEGATIVE — SIGNIFICANT CHANGE UP
LEUKOCYTE ESTERASE UR-ACNC: NEGATIVE — SIGNIFICANT CHANGE UP
MCHC RBC-ENTMCNC: 25.8 PG — LOW (ref 27–34)
MCHC RBC-ENTMCNC: 30.6 G/DL — LOW (ref 32–36)
MCV RBC AUTO: 84.3 FL — SIGNIFICANT CHANGE UP (ref 80–100)
NITRITE UR-MCNC: NEGATIVE — SIGNIFICANT CHANGE UP
PH UR: 6 — SIGNIFICANT CHANGE UP (ref 5–8)
PLATELET # BLD AUTO: 273 K/UL — SIGNIFICANT CHANGE UP (ref 150–400)
POTASSIUM SERPL-MCNC: 4.8 MMOL/L — SIGNIFICANT CHANGE UP (ref 3.5–5.3)
POTASSIUM SERPL-SCNC: 4.8 MMOL/L — SIGNIFICANT CHANGE UP (ref 3.5–5.3)
PROT UR-MCNC: NEGATIVE MG/DL — SIGNIFICANT CHANGE UP
PROTHROM AB SERPL-ACNC: 11.5 SEC — SIGNIFICANT CHANGE UP (ref 9.8–12.7)
RBC # BLD: 5.16 M/UL — SIGNIFICANT CHANGE UP (ref 4.2–5.8)
RBC # FLD: 17.2 % — HIGH (ref 10.3–16.9)
SODIUM SERPL-SCNC: 140 MMOL/L — SIGNIFICANT CHANGE UP (ref 135–145)
SP GR SPEC: 1.02 — SIGNIFICANT CHANGE UP (ref 1–1.03)
UROBILINOGEN FLD QL: 0.2 E.U./DL — SIGNIFICANT CHANGE UP
WBC # BLD: 9.3 K/UL — SIGNIFICANT CHANGE UP (ref 3.8–10.5)
WBC # FLD AUTO: 9.3 K/UL — SIGNIFICANT CHANGE UP (ref 3.8–10.5)

## 2018-08-07 PROCEDURE — 85027 COMPLETE CBC AUTOMATED: CPT

## 2018-08-07 PROCEDURE — 83036 HEMOGLOBIN GLYCOSYLATED A1C: CPT

## 2018-08-07 PROCEDURE — 80048 BASIC METABOLIC PNL TOTAL CA: CPT

## 2018-08-07 PROCEDURE — 87086 URINE CULTURE/COLONY COUNT: CPT

## 2018-08-07 PROCEDURE — 85610 PROTHROMBIN TIME: CPT

## 2018-08-07 PROCEDURE — 81003 URINALYSIS AUTO W/O SCOPE: CPT

## 2018-08-07 PROCEDURE — 20610 DRAIN/INJ JOINT/BURSA W/O US: CPT | Mod: LT

## 2018-08-07 PROCEDURE — 85730 THROMBOPLASTIN TIME PARTIAL: CPT

## 2018-08-07 RX ADMIN — Medication 1 MG/2ML: at 00:00

## 2018-08-08 LAB
CULTURE RESULTS: SIGNIFICANT CHANGE UP
SPECIMEN SOURCE: SIGNIFICANT CHANGE UP

## 2018-08-09 ENCOUNTER — APPOINTMENT (OUTPATIENT)
Dept: HEART AND VASCULAR | Facility: CLINIC | Age: 63
End: 2018-08-09

## 2018-08-13 ENCOUNTER — FORM ENCOUNTER (OUTPATIENT)
Age: 63
End: 2018-08-13

## 2018-08-14 ENCOUNTER — APPOINTMENT (OUTPATIENT)
Dept: INTERNAL MEDICINE | Facility: CLINIC | Age: 63
End: 2018-08-14
Payer: COMMERCIAL

## 2018-08-14 ENCOUNTER — LABORATORY RESULT (OUTPATIENT)
Age: 63
End: 2018-08-14

## 2018-08-14 ENCOUNTER — OUTPATIENT (OUTPATIENT)
Dept: OUTPATIENT SERVICES | Facility: HOSPITAL | Age: 63
LOS: 1 days | End: 2018-08-14
Payer: COMMERCIAL

## 2018-08-14 ENCOUNTER — APPOINTMENT (OUTPATIENT)
Dept: CT IMAGING | Facility: HOSPITAL | Age: 63
End: 2018-08-14
Payer: COMMERCIAL

## 2018-08-14 VITALS
HEART RATE: 62 BPM | DIASTOLIC BLOOD PRESSURE: 60 MMHG | HEIGHT: 73 IN | WEIGHT: 259 LBS | SYSTOLIC BLOOD PRESSURE: 110 MMHG | OXYGEN SATURATION: 95 % | BODY MASS INDEX: 34.33 KG/M2 | TEMPERATURE: 99.4 F | RESPIRATION RATE: 14 BRPM

## 2018-08-14 DIAGNOSIS — Z01.818 ENCOUNTER FOR OTHER PREPROCEDURAL EXAMINATION: ICD-10-CM

## 2018-08-14 PROCEDURE — 73700 CT LOWER EXTREMITY W/O DYE: CPT | Mod: 26,RT

## 2018-08-14 PROCEDURE — 99214 OFFICE O/P EST MOD 30 MIN: CPT

## 2018-08-14 PROCEDURE — 73700 CT LOWER EXTREMITY W/O DYE: CPT

## 2018-08-16 PROBLEM — Z01.818 PRE-OP EXAM: Status: ACTIVE | Noted: 2018-08-16

## 2018-08-16 NOTE — PLAN
[FreeTextEntry1] : REviewed need for tighter diabetic control going into surgery.  He is now optimized from a cardiac standpoint.  He remains moderate risk for this moderate risk surgery.

## 2018-08-16 NOTE — HISTORY OF PRESENT ILLNESS
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 Nuno on 6/1/18. \par sig peripheral neuropathy\par Endo- follows with Dr Wilkinson.  Last A1C of 9.5%

## 2018-08-16 NOTE — PHYSICAL EXAM
[No Acute Distress] : no acute distress [Well Nourished] : well nourished [Well Developed] : well developed [No JVD] : no jugular venous distention [Supple] : supple [No Respiratory Distress] : no respiratory distress  [Clear to Auscultation] : lungs were clear to auscultation bilaterally [No Accessory Muscle Use] : no accessory muscle use [Normal Rate] : normal rate  [Regular Rhythm] : with a regular rhythm [Normal S1, S2] : normal S1 and S2 [Soft] : abdomen soft [Non Tender] : non-tender [No HSM] : no HSM [Normal Affect] : the affect was normal [Normal Insight/Judgement] : insight and judgment were intact [de-identified] : right joint limited rom

## 2018-08-20 ENCOUNTER — MESSAGE (OUTPATIENT)
Age: 63
End: 2018-08-20

## 2018-08-23 ENCOUNTER — APPOINTMENT (OUTPATIENT)
Dept: ORTHOPEDIC SURGERY | Facility: CLINIC | Age: 63
End: 2018-08-23
Payer: COMMERCIAL

## 2018-08-23 PROCEDURE — 20610 DRAIN/INJ JOINT/BURSA W/O US: CPT | Mod: RT

## 2018-08-23 RX ORDER — FAMOTIDINE 20 MG/1
20 TABLET, FILM COATED ORAL TWICE DAILY
Qty: 60 | Refills: 3 | Status: DISCONTINUED | COMMUNITY
End: 2018-08-23

## 2018-08-23 RX ADMIN — Medication 1 MG/2ML: at 00:00

## 2018-08-28 ENCOUNTER — APPOINTMENT (OUTPATIENT)
Dept: HEART AND VASCULAR | Facility: CLINIC | Age: 63
End: 2018-08-28

## 2018-09-04 ENCOUNTER — APPOINTMENT (OUTPATIENT)
Dept: ORTHOPEDIC SURGERY | Facility: CLINIC | Age: 63
End: 2018-09-04
Payer: COMMERCIAL

## 2018-09-04 PROCEDURE — 20610 DRAIN/INJ JOINT/BURSA W/O US: CPT | Mod: RT

## 2018-09-04 RX ADMIN — Medication 1 MG/2ML: at 00:00

## 2018-09-06 ENCOUNTER — APPOINTMENT (OUTPATIENT)
Dept: HEART AND VASCULAR | Facility: CLINIC | Age: 63
End: 2018-09-06
Payer: COMMERCIAL

## 2018-09-06 VITALS
SYSTOLIC BLOOD PRESSURE: 110 MMHG | BODY MASS INDEX: 34.19 KG/M2 | WEIGHT: 258 LBS | HEART RATE: 62 BPM | HEIGHT: 73 IN | DIASTOLIC BLOOD PRESSURE: 70 MMHG

## 2018-09-06 DIAGNOSIS — Z88.9 ALLERGY STATUS TO UNSPECIFIED DRUGS, MEDICAMENTS AND BIOLOGICAL SUBSTANCES: ICD-10-CM

## 2018-09-06 DIAGNOSIS — I73.9 PERIPHERAL VASCULAR DISEASE, UNSPECIFIED: ICD-10-CM

## 2018-09-06 PROCEDURE — 93000 ELECTROCARDIOGRAM COMPLETE: CPT

## 2018-09-06 PROCEDURE — 93923 UPR/LXTR ART STDY 3+ LVLS: CPT

## 2018-09-06 PROCEDURE — 36415 COLL VENOUS BLD VENIPUNCTURE: CPT

## 2018-09-06 PROCEDURE — 99244 OFF/OP CNSLTJ NEW/EST MOD 40: CPT | Mod: 25

## 2018-09-06 RX ORDER — POTASSIUM CHLORIDE 1500 MG/1
20 TABLET, FILM COATED, EXTENDED RELEASE ORAL DAILY
Refills: 0 | Status: DISCONTINUED | COMMUNITY
End: 2018-09-06

## 2018-09-06 RX ORDER — EZETIMIBE 10 MG/1
10 TABLET ORAL DAILY
Qty: 1 | Refills: 0 | Status: DISCONTINUED | COMMUNITY
End: 2018-09-06

## 2018-09-13 ENCOUNTER — APPOINTMENT (OUTPATIENT)
Dept: ORTHOPEDIC SURGERY | Facility: CLINIC | Age: 63
End: 2018-09-13
Payer: COMMERCIAL

## 2018-09-13 PROCEDURE — 20610 DRAIN/INJ JOINT/BURSA W/O US: CPT | Mod: RT

## 2018-09-18 LAB
ANION GAP SERPL CALC-SCNC: 13 MMOL/L
BUN SERPL-MCNC: 31 MG/DL
CALCIUM SERPL-MCNC: 9.4 MG/DL
CHLORIDE SERPL-SCNC: 100 MMOL/L
CHOLEST SERPL-MCNC: 133 MG/DL
CHOLEST/HDLC SERPL: 3.3 RATIO
CO2 SERPL-SCNC: 28 MMOL/L
CREAT SERPL-MCNC: 0.91 MG/DL
GLUCOSE SERPL-MCNC: 218 MG/DL
HBA1C MFR BLD HPLC: 9.8 %
HDLC SERPL-MCNC: 40 MG/DL
LDLC SERPL CALC-MCNC: 74 MG/DL
POTASSIUM SERPL-SCNC: 5.5 MMOL/L
SODIUM SERPL-SCNC: 141 MMOL/L
TRIGL SERPL-MCNC: 96 MG/DL

## 2018-10-04 ENCOUNTER — APPOINTMENT (OUTPATIENT)
Dept: ENDOCRINOLOGY | Facility: CLINIC | Age: 63
End: 2018-10-04
Payer: COMMERCIAL

## 2018-10-04 VITALS
WEIGHT: 264 LBS | HEART RATE: 78 BPM | SYSTOLIC BLOOD PRESSURE: 139 MMHG | BODY MASS INDEX: 34.83 KG/M2 | DIASTOLIC BLOOD PRESSURE: 65 MMHG

## 2018-10-04 PROCEDURE — 82962 GLUCOSE BLOOD TEST: CPT

## 2018-10-04 PROCEDURE — 99205 OFFICE O/P NEW HI 60 MIN: CPT | Mod: 25

## 2018-10-05 VITALS
DIASTOLIC BLOOD PRESSURE: 55 MMHG | OXYGEN SATURATION: 98 % | TEMPERATURE: 97 F | RESPIRATION RATE: 16 BRPM | HEIGHT: 73 IN | WEIGHT: 259.93 LBS | SYSTOLIC BLOOD PRESSURE: 113 MMHG

## 2018-10-05 RX ORDER — CHLORHEXIDINE GLUCONATE 213 G/1000ML
1 SOLUTION TOPICAL ONCE
Qty: 0 | Refills: 0 | Status: DISCONTINUED | OUTPATIENT
Start: 2018-10-08 | End: 2018-10-09

## 2018-10-05 NOTE — H&P ADULT - RS GEN PE MLT RESP DETAILS PC
clear to auscultation bilaterally/good air movement/normal/breath sounds equal/no rales/no rhonchi/no wheezes

## 2018-10-05 NOTE — H&P ADULT - NSHPLABSRESULTS_GEN_ALL_CORE
12.8   9.7   )-----------( 274      ( 08 Oct 2018 09:04 )             40.6     PT/INR - ( 08 Oct 2018 09:04 )   PT: 10.9 sec;   INR: 0.98          PTT - ( 08 Oct 2018 09:04 )  PTT:27.9 sec          EKG: LBBB @ 60 BPM. 12.8   9.7   )-----------( 274      ( 08 Oct 2018 09:04 )             40.6     PT/INR - ( 08 Oct 2018 09:04 )   PT: 10.9 sec;   INR: 0.98          PTT - ( 08 Oct 2018 09:04 )  PTT:27.9 sec    10-08    136  |  96  |  35<H>  ----------------------------<  182<H>  4.7   |  29  |  1.51<H>    Ca    9.3      08 Oct 2018 09:04    TPro  7.1  /  Alb  4.2  /  TBili  0.4  /  DBili  <0.2  /  AST  23  /  ALT  33  /  AlkPhos  85  10-08    CARDIAC MARKERS ( 08 Oct 2018 09:04 )  x     / x     / 417 U/L / x     / 13.6 ng/mL          EKG: LBBB @ 60 BPM.

## 2018-10-05 NOTE — H&P ADULT - ASSESSMENT
62 year old male with history of HTN, HLD, hyperkalemia, chronic CHFrEF, EF 25%, IDDM (insulin pump), depression, and CAD s/p robotic MIDCAB (SCHWARTZ- LAD) with Dr. Nuno 06/2018 s/p most recent cardiac cath 05/2018 revealing normal LM, 99% oLAD, 100% D1 ISR with L-L collaterals, 30% OM1, 75% dRCA IFR 0.85; EF 20% and EDP 25 mmHg who now presents for staged intervention of residual RCA disease.    ASA III, Mallampati III    H/H WNL. Took home ASA 81 mg x1, plavix 75 mg x1 this AM and reports daily compliance. No additional load needed.     EF 25-30% by last ECHO 11/2017. Euvolemic on exam.     Pt to go for right knee surgery in future. Pt initially planning for 1/2019. Alerted Dr. Campuzano who spoke with pt with plan to proceed with VERONICA and pt to go right R knee surgery in 6 months.     Pre-cath consented    Risks & benefits of procedure and alternative therapy have been explained to the patient including but not limited to: allergic reaction, bleeding w/possible need for blood transfusion, infection, renal and vascular compromise, limb damage, arrhythmia, stroke, vessel dissection/perforation, Myocardial infarction, emergent CABG. Informed consent obtained and in chart. 62 year old male with history of HTN, HLD, hyperkalemia, chronic CHFrEF, EF 25%, IDDM (insulin pump), depression, and CAD s/p robotic MIDCAB (SCHWARTZ- LAD) with Dr. Nuno 06/2018 s/p most recent cardiac cath 05/2018 revealing normal LM, 99% oLAD, 100% D1 ISR with L-L collaterals, 30% OM1, 75% dRCA IFR 0.85; EF 20% and EDP 25 mmHg who now presents for staged intervention of residual RCA disease.    ASA III, Mallampati III    H/H WNL. Took home ASA 81 mg x1, plavix 75 mg x1 this AM and reports daily compliance. No additional load needed.     EF 25-30% by last ECHO 11/2017. Euvolemic on exam.     Pt to go for right knee surgery in future. Pt initially planning for 1/2019. Alerted Dr. Campuzano who spoke with pt with plan to proceed with VERONICA and pt to go right R knee surgery in 6 months.     Pt with insulin pump at home. Consent to self-administer signed and placed in chart.     Pre-cath consented    Risks & benefits of procedure and alternative therapy have been explained to the patient including but not limited to: allergic reaction, bleeding w/possible need for blood transfusion, infection, renal and vascular compromise, limb damage, arrhythmia, stroke, vessel dissection/perforation, Myocardial infarction, emergent CABG. Informed consent obtained and in chart. 62 year old male with history of HTN, HLD, hyperkalemia, chronic CHFrEF, EF 25%, IDDM (insulin pump), depression, and CAD s/p robotic MIDCAB (SCHWARTZ- LAD) with Dr. Nuno 06/2018 s/p most recent cardiac cath 05/2018 revealing normal LM, 99% oLAD, 100% D1 ISR with L-L collaterals, 30% OM1, 75% dRCA IFR 0.85; EF 20% and EDP 25 mmHg who now presents for staged intervention of residual RCA disease.    ASA III, Mallampati III    H/H 12.8/40.6. Took home ASA 81 mg x1, plavix 75 mg x1 this AM and reports daily compliance. No additional load needed.     EF 25-30% by last ECHO 11/2017. Euvolemic on exam.     Pt to go for right knee surgery in future. Pt initially planning for 1/2019. Alerted Dr. Campuzano who spoke with pt with plan to proceed with VERONICA and pt to go right R knee surgery in 6 months.     Pt with insulin pump at home. Consent to self-administer signed and placed in chart.     Pre-cath consented    Risks & benefits of procedure and alternative therapy have been explained to the patient including but not limited to: allergic reaction, bleeding w/possible need for blood transfusion, infection, renal and vascular compromise, limb damage, arrhythmia, stroke, vessel dissection/perforation, Myocardial infarction, emergent CABG. Informed consent obtained and in chart. 62 year old male with history of HTN, HLD, hyperkalemia, chronic CHFrEF, EF 25%, IDDM (insulin pump), depression, and CAD s/p robotic MIDCAB (SCHWARTZ- LAD) with Dr. Nuno 06/2018 s/p most recent cardiac cath 05/2018 revealing normal LM, 99% oLAD, 100% D1 ISR with L-L collaterals, 30% OM1, 75% dRCA IFR 0.85; EF 20% and EDP 25 mmHg who now presents for staged intervention of residual RCA disease.    ASA III, Mallampati III    H/H 12.8/40.6. Took home ASA 81 mg x1, plavix 75 mg x1 this AM and reports daily compliance. No additional load needed.     Cr 1.51. EF 25-30% by last ECHO 11/2017. Euvolemic on exam.  1/2 NS @ 40 cc/hr x3 hours pre-cath. Alerted Dr. Campuzano.     Pt to go for right knee surgery in future. Pt initially planning for 1/2019. Alerted Dr. Campuzano who spoke with pt with plan to proceed with VERONICA and pt to go right R knee surgery in 6 months.     Pt with insulin pump at home. Consent to self-administer signed and placed in chart.     Pre-cath consented    Risks & benefits of procedure and alternative therapy have been explained to the patient including but not limited to: allergic reaction, bleeding w/possible need for blood transfusion, infection, renal and vascular compromise, limb damage, arrhythmia, stroke, vessel dissection/perforation, Myocardial infarction, emergent CABG. Informed consent obtained and in chart.

## 2018-10-05 NOTE — H&P ADULT - HISTORY OF PRESENT ILLNESS
62 year old male with history of HTN, HLD, hyperkalemia, chronic CHFrEF, EF 25%, IDDM (insulin pump), depression, and CAD s/p robotic MIDCAB (SCHWARTZ- LAD) with Dr. Nuno 06/2018 s/p most recent cardiac cath 05/2018 revealing normal LM, 99% oLAD, 100% D1 ISR with L-L collaterals, 30% OM1, 75% dRCA IFR 0.85; EF 20% and EDP 25 mmHg who now presents for staged intervention of residual RCA disease. Pt. initially presented at [facility] for [facility] 5/2018 for pre-op clearance for right total knee replacement. He underwent cardiac cath which revealed above findings and then underwent MIDCAB (SCHWARTZ-LAD) with Dr. Nuno. Since his procedure; pt. reports feeling great; he states that he does not have any CP, SOB, dizziness, diaphoresis, palpitations, fatigue, LE edema, orthopnea, PND, syncope. However; he states that his exercise tolerance is limited by his knee pain. Pt. states that he is planning to get knee surgery on DEC of this year or Jan 2019. Due to pts risk factors,and known residual RCA disease, pt is referred for staged intervention of RCA disease.         Cardiac Hx:  cardiac cath on 5/24/18 showing revealing normal LM, 99% oLAD, 100% D1 ISR with L-L collaterals, 30% OM1, 75% dRCA IFR 0.85; EF 20% and EDP 25 mmHg  NST on 8/17/17 demonstrated abnormal myocardial perfusion c/w scarring of anterior and anterolateral walls and mild stress-induced ischemia at the margins of the defect and global hypokinesis with decreased EF.    ECHO in 11/2017 showed apical hypokinesis and EF 25-30%   cardiac viability study on 5/25/18 which showed fixed perfusion defect involving the anterior wall and extending into anterolateral segments but with the remainder of his myocardium viable. OF NOTE: UNDER PMH; BLOOD CLOT DUE TO DEVICE; IMPLANT OR GRAFT (WAS ON BLOOD THINNERS); PT. WAS CALLED AGAIN TO CONFIRM; PT. DID NOT ANSWER; PLEASE RECONFIRM WITH PT.( NO NOTE IN SUNRISE )     62 year old male with history of HTN, HLD, hyperkalemia, chronic CHFrEF, EF 25%, IDDM (insulin pump), depression, and CAD s/p robotic MIDCAB (SCHWARTZ- LAD) with Dr. Nuno 06/2018 s/p most recent cardiac cath 05/2018 revealing normal LM, 99% oLAD, 100% D1 ISR with L-L collaterals, 30% OM1, 75% dRCA IFR 0.85; EF 20% and EDP 25 mmHg who now presents for staged intervention of residual RCA disease. Pt. initially presented at [facility] for [facility] 5/2018 for pre-op clearance for right total knee replacement. He underwent cardiac cath which revealed above findings and then underwent MIDCAB (SCHWARTZ-LAD) with Dr. Nuno. Since his procedure; pt. reports feeling great; he states that he does not have any CP, SOB, dizziness, diaphoresis, palpitations, fatigue, LE edema, orthopnea, PND, syncope. However; he states that his exercise tolerance is limited by his knee pain. Pt. states that he is planning to get knee surgery on DEC of this year or Jan 2019. Due to pts risk factors,and known residual RCA disease, pt is referred for staged intervention of RCA disease.         Cardiac Hx:  cardiac cath on 5/24/18 showing revealing normal LM, 99% oLAD, 100% D1 ISR with L-L collaterals, 30% OM1, 75% dRCA IFR 0.85; EF 20% and EDP 25 mmHg  NST on 8/17/17 demonstrated abnormal myocardial perfusion c/w scarring of anterior and anterolateral walls and mild stress-induced ischemia at the margins of the defect and global hypokinesis with decreased EF.    ECHO in 11/2017 showed apical hypokinesis and EF 25-30%   cardiac viability study on 5/25/18 which showed fixed perfusion defect involving the anterior wall and extending into anterolateral segments but with the remainder of his myocardium viable. 62 year old male with history of HTN, HLD, hyperkalemia, chronic CHFrEF, EF 25%, IDDM (insulin pump), depression, and CAD s/p robotic MIDCAB (SCHWARTZ- LAD) with Dr. Nuno 06/2018 s/p most recent cardiac cath 05/2018 revealing normal LM, 99% oLAD, 100% D1 ISR with L-L collaterals, 30% OM1, 75% dRCA IFR 0.85; EF 20% and EDP 25 mmHg who now presents for staged intervention of residual RCA disease. Pt. initially presented at [facility] for [facility] 5/2018 for pre-op clearance for right total knee replacement. He underwent cardiac cath which revealed above findings and then underwent MIDCAB (SCHWARTZ-LAD) with Dr. Nuno. Since his procedure; pt. reports feeling great; he states that he does not have any CP, SOB, dizziness, diaphoresis, palpitations, fatigue, LE edema, orthopnea, PND, syncope. However; he states that his exercise tolerance is limited by his knee pain. Pt. states that he is planning to get knee surgery on DEC of this year or Jan 2019. Due to pts risk factors,and known residual RCA disease, pt is referred for staged intervention of RCA disease.         Cardiac Hx:  cardiac cath on 5/24/18 showing revealing normal LM, 99% oLAD, 100% D1 ISR with L-L collaterals, 30% OM1, 75% dRCA IFR 0.85; EF 20% and EDP 25 mmHg  NST on 8/17/17 demonstrated abnormal myocardial perfusion c/w scarring of anterior and anterolateral walls and mild stress-induced ischemia at the margins of the defect and global hypokinesis with decreased EF.    ECHO in 11/2017 showed apical hypokinesis and EF 25-30%   cardiac viability study on 5/25/18 which showed fixed perfusion defect involving the anterior wall and extending into anterolateral segments but with the remainder of his myocardium viable.

## 2018-10-08 ENCOUNTER — INPATIENT (INPATIENT)
Facility: HOSPITAL | Age: 63
LOS: 0 days | Discharge: HOME | DRG: 247 | End: 2018-10-09
Attending: INTERNAL MEDICINE | Admitting: INTERNAL MEDICINE
Payer: COMMERCIAL

## 2018-10-08 LAB
ALBUMIN SERPL ELPH-MCNC: 4.2 G/DL — SIGNIFICANT CHANGE UP (ref 3.3–5)
ALP SERPL-CCNC: 85 U/L — SIGNIFICANT CHANGE UP (ref 40–120)
ALT FLD-CCNC: 33 U/L — SIGNIFICANT CHANGE UP (ref 10–45)
ANION GAP SERPL CALC-SCNC: 11 MMOL/L — SIGNIFICANT CHANGE UP (ref 5–17)
APTT BLD: 27.9 SEC — SIGNIFICANT CHANGE UP (ref 27.5–37.4)
AST SERPL-CCNC: 23 U/L — SIGNIFICANT CHANGE UP (ref 10–40)
BASOPHILS NFR BLD AUTO: 0.4 % — SIGNIFICANT CHANGE UP (ref 0–2)
BILIRUB SERPL-MCNC: 0.4 MG/DL — SIGNIFICANT CHANGE UP (ref 0.2–1.2)
BUN SERPL-MCNC: 35 MG/DL — HIGH (ref 7–23)
CALCIUM SERPL-MCNC: 9.3 MG/DL — SIGNIFICANT CHANGE UP (ref 8.4–10.5)
CHLORIDE SERPL-SCNC: 96 MMOL/L — SIGNIFICANT CHANGE UP (ref 96–108)
CHOLEST SERPL-MCNC: 190 MG/DL — SIGNIFICANT CHANGE UP (ref 10–199)
CK MB CFR SERPL CALC: 13.6 NG/ML — HIGH (ref 0–6.7)
CK SERPL-CCNC: 417 U/L — HIGH (ref 30–200)
CO2 SERPL-SCNC: 29 MMOL/L — SIGNIFICANT CHANGE UP (ref 22–31)
CREAT SERPL-MCNC: 1.51 MG/DL — HIGH (ref 0.5–1.3)
CRP SERPL-MCNC: 0.62 MG/DL — HIGH (ref 0–0.4)
EOSINOPHIL NFR BLD AUTO: 1.8 % — SIGNIFICANT CHANGE UP (ref 0–6)
GLUCOSE BLDC GLUCOMTR-MCNC: 186 MG/DL — HIGH (ref 70–99)
GLUCOSE BLDC GLUCOMTR-MCNC: 232 MG/DL — HIGH (ref 70–99)
GLUCOSE BLDC GLUCOMTR-MCNC: 240 MG/DL — HIGH (ref 70–99)
GLUCOSE SERPL-MCNC: 182 MG/DL — HIGH (ref 70–99)
HBA1C BLD-MCNC: 9.2 % — HIGH (ref 4–5.6)
HCT VFR BLD CALC: 40.6 % — SIGNIFICANT CHANGE UP (ref 39–50)
HDLC SERPL-MCNC: 40 MG/DL — SIGNIFICANT CHANGE UP
HGB BLD-MCNC: 12.8 G/DL — LOW (ref 13–17)
INR BLD: 0.98 — SIGNIFICANT CHANGE UP (ref 0.88–1.16)
LIPID PNL WITH DIRECT LDL SERPL: 125 MG/DL — SIGNIFICANT CHANGE UP
LYMPHOCYTES # BLD AUTO: 19.7 % — SIGNIFICANT CHANGE UP (ref 13–44)
MCHC RBC-ENTMCNC: 26.5 PG — LOW (ref 27–34)
MCHC RBC-ENTMCNC: 31.5 G/DL — LOW (ref 32–36)
MCV RBC AUTO: 84.1 FL — SIGNIFICANT CHANGE UP (ref 80–100)
MONOCYTES NFR BLD AUTO: 6.9 % — SIGNIFICANT CHANGE UP (ref 2–14)
NEUTROPHILS NFR BLD AUTO: 71.2 % — SIGNIFICANT CHANGE UP (ref 43–77)
PLATELET # BLD AUTO: 274 K/UL — SIGNIFICANT CHANGE UP (ref 150–400)
POTASSIUM SERPL-MCNC: 4.7 MMOL/L — SIGNIFICANT CHANGE UP (ref 3.5–5.3)
POTASSIUM SERPL-SCNC: 4.7 MMOL/L — SIGNIFICANT CHANGE UP (ref 3.5–5.3)
PROT SERPL-MCNC: 7.1 G/DL — SIGNIFICANT CHANGE UP (ref 6–8.3)
PROTHROM AB SERPL-ACNC: 10.9 SEC — SIGNIFICANT CHANGE UP (ref 9.8–12.7)
RBC # BLD: 4.83 M/UL — SIGNIFICANT CHANGE UP (ref 4.2–5.8)
RBC # FLD: 15.4 % — SIGNIFICANT CHANGE UP (ref 10.3–16.9)
SODIUM SERPL-SCNC: 136 MMOL/L — SIGNIFICANT CHANGE UP (ref 135–145)
TOTAL CHOLESTEROL/HDL RATIO MEASUREMENT: 4.8 RATIO — SIGNIFICANT CHANGE UP (ref 3.4–9.6)
TRIGL SERPL-MCNC: 126 MG/DL — SIGNIFICANT CHANGE UP (ref 10–149)
WBC # BLD: 9.7 K/UL — SIGNIFICANT CHANGE UP (ref 3.8–10.5)
WBC # FLD AUTO: 9.7 K/UL — SIGNIFICANT CHANGE UP (ref 3.8–10.5)

## 2018-10-08 PROCEDURE — 92928 PRQ TCAT PLMT NTRAC ST 1 LES: CPT | Mod: RC

## 2018-10-08 PROCEDURE — 93010 ELECTROCARDIOGRAM REPORT: CPT

## 2018-10-08 PROCEDURE — 93458 L HRT ARTERY/VENTRICLE ANGIO: CPT | Mod: 26,XU

## 2018-10-08 RX ORDER — METOPROLOL TARTRATE 50 MG
37.5 TABLET ORAL
Qty: 0 | Refills: 0 | Status: DISCONTINUED | OUTPATIENT
Start: 2018-10-08 | End: 2018-10-09

## 2018-10-08 RX ORDER — METOPROLOL TARTRATE 50 MG
0 TABLET ORAL
Qty: 0 | Refills: 0 | COMMUNITY

## 2018-10-08 RX ORDER — FUROSEMIDE 40 MG
40 TABLET ORAL DAILY
Qty: 0 | Refills: 0 | Status: DISCONTINUED | OUTPATIENT
Start: 2018-10-09 | End: 2018-10-09

## 2018-10-08 RX ORDER — SODIUM CHLORIDE 9 MG/ML
500 INJECTION, SOLUTION INTRAVENOUS
Qty: 0 | Refills: 0 | Status: COMPLETED | OUTPATIENT
Start: 2018-10-08 | End: 2018-10-08

## 2018-10-08 RX ORDER — DEXTROSE 50 % IN WATER 50 %
12.5 SYRINGE (ML) INTRAVENOUS ONCE
Qty: 0 | Refills: 0 | Status: DISCONTINUED | OUTPATIENT
Start: 2018-10-08 | End: 2018-10-09

## 2018-10-08 RX ORDER — SODIUM CHLORIDE 9 MG/ML
500 INJECTION, SOLUTION INTRAVENOUS
Qty: 0 | Refills: 0 | Status: DISCONTINUED | OUTPATIENT
Start: 2018-10-08 | End: 2018-10-08

## 2018-10-08 RX ORDER — INSULIN LISPRO 100/ML
1 VIAL (ML) SUBCUTANEOUS
Qty: 0 | Refills: 0 | Status: DISCONTINUED | OUTPATIENT
Start: 2018-10-08 | End: 2018-10-09

## 2018-10-08 RX ORDER — HYDRALAZINE HCL 50 MG
100 TABLET ORAL
Qty: 0 | Refills: 0 | Status: DISCONTINUED | OUTPATIENT
Start: 2018-10-08 | End: 2018-10-09

## 2018-10-08 RX ORDER — SODIUM CHLORIDE 9 MG/ML
1000 INJECTION, SOLUTION INTRAVENOUS
Qty: 0 | Refills: 0 | Status: DISCONTINUED | OUTPATIENT
Start: 2018-10-08 | End: 2018-10-09

## 2018-10-08 RX ORDER — DEXTROSE 50 % IN WATER 50 %
25 SYRINGE (ML) INTRAVENOUS ONCE
Qty: 0 | Refills: 0 | Status: DISCONTINUED | OUTPATIENT
Start: 2018-10-08 | End: 2018-10-09

## 2018-10-08 RX ORDER — DEXTROSE 50 % IN WATER 50 %
15 SYRINGE (ML) INTRAVENOUS ONCE
Qty: 0 | Refills: 0 | Status: DISCONTINUED | OUTPATIENT
Start: 2018-10-08 | End: 2018-10-09

## 2018-10-08 RX ORDER — ASPIRIN/CALCIUM CARB/MAGNESIUM 324 MG
81 TABLET ORAL DAILY
Qty: 0 | Refills: 0 | Status: DISCONTINUED | OUTPATIENT
Start: 2018-10-09 | End: 2018-10-09

## 2018-10-08 RX ORDER — HYDRALAZINE HCL 50 MG
1 TABLET ORAL
Qty: 0 | Refills: 0 | COMMUNITY

## 2018-10-08 RX ORDER — CLOPIDOGREL BISULFATE 75 MG/1
75 TABLET, FILM COATED ORAL DAILY
Qty: 0 | Refills: 0 | Status: DISCONTINUED | OUTPATIENT
Start: 2018-10-09 | End: 2018-10-09

## 2018-10-08 RX ORDER — DULOXETINE HYDROCHLORIDE 30 MG/1
90 CAPSULE, DELAYED RELEASE ORAL DAILY
Qty: 0 | Refills: 0 | Status: DISCONTINUED | OUTPATIENT
Start: 2018-10-08 | End: 2018-10-09

## 2018-10-08 RX ORDER — ATORVASTATIN CALCIUM 80 MG/1
80 TABLET, FILM COATED ORAL AT BEDTIME
Qty: 0 | Refills: 0 | Status: DISCONTINUED | OUTPATIENT
Start: 2018-10-08 | End: 2018-10-09

## 2018-10-08 RX ORDER — GLUCAGON INJECTION, SOLUTION 0.5 MG/.1ML
1 INJECTION, SOLUTION SUBCUTANEOUS ONCE
Qty: 0 | Refills: 0 | Status: DISCONTINUED | OUTPATIENT
Start: 2018-10-08 | End: 2018-10-09

## 2018-10-08 RX ADMIN — SODIUM CHLORIDE 40 MILLILITER(S): 9 INJECTION, SOLUTION INTRAVENOUS at 19:56

## 2018-10-08 RX ADMIN — Medication 37.5 MILLIGRAM(S): at 19:46

## 2018-10-08 RX ADMIN — Medication 100 MILLIGRAM(S): at 18:50

## 2018-10-08 RX ADMIN — ATORVASTATIN CALCIUM 80 MILLIGRAM(S): 80 TABLET, FILM COATED ORAL at 22:25

## 2018-10-08 RX ADMIN — SODIUM CHLORIDE 40 MILLILITER(S): 9 INJECTION, SOLUTION INTRAVENOUS at 10:05

## 2018-10-08 RX ADMIN — Medication 20 MILLIGRAM(S): at 18:49

## 2018-10-08 NOTE — CONSULT NOTE ADULT - SUBJECTIVE AND OBJECTIVE BOX
CHIEF COMPLAINT:    Referring MD: Lilia Caldera    PMD: Lilia Caldera  Cardiologist: Lilia Caldera  Endocrinologist: Dr. Cormier    HISTORY OF PRESENT ILLNESS:  62 year old male with history of HTN, HLD, hyperkalemia, chronic CHFrEF, EF 25%, IDDM, depression, and CAD s/p robotic MIDCAB (SCHWARTZ- LAD) with Dr. Nuno 2018 s/p most recent cardiac cath 2018 revealing normal LM, 99% oLAD, 100% D1 ISR with L-L collaterals, 30% OM1, 75% dRCA IFR 0.85; EF 20% and EDP 25 mmHg who now presents for staged intervention of residual RCA disease. Consulted for preventive cardiology.     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  Type 2 diabetes mellitus: DM (diabetes mellitus), type 2  History of surgery to heart and great vessels, presenting hazards to health: x 4 in   Atherosclerosis of coronary artery: CAD (coronary artery disease)  Status post percutaneous transluminal coronary angioplasty: in   History of surgery to major organs, presenting hazards to health: heart stents  Other postprocedural status: Fixation hardware in foot    FAMILY HISTORY:   Family history of heart disease (Mother)    ALLERGIES:   ACE inhibitors (Hives)  enalapril (Hives)    HOME MEDICATIONS:  Plavix 75mg  ASA 81mg  Zetia 10mg  Crestor 10mg  Omeg 3  Cymbalta 90mg  Hydralazine 100mg  Lasix 40mg  metoprolol 37.5mg BID  Humalog TID    INPATIENT MEDS:  chlorhexidine 4% Liquid 1 Application(s) Topical once  sodium chloride 0.45%. 500 milliLiter(s) IV Continuous <Continuous>  	  Statin Intolerance: in the past, but tolerating Crestor >1 year.     PHYSICAL EXAM:  Awake, talkative, in NAD  AXOX3, follows commands, appropriate  Neck supple, no JVD noted  PEERL, nasal/buccal mucosa moist and well perfused  BS clear bilaterally, unlabored, symmetrical  S1/S2, no S3, RRR, no M/G/R noted  Abdomen soft, non tender, non distended  No edema noted, perfusion brisk  Pulses palpable throughout  Skin warm and dry, no rashes/lesions noted  L wrist with pressure dressing secure, no hematoma/bleeding noted. Perfusion and sensation intact.    HR: 76  BP: 140/70  RR: 18  SpO2: 96% RA  Height (cm): 185.42 (10-08 @ 09:26)  Weight (kg): 117.9 (10-08 @ 09:)  BMI (kg/m2): 34.3 (10-08 @ 09:)  	  LABS:                  12.8   9.7   )-----------( 274      ( 08 Oct 2018 09:04 )             40.6     10-08    136  |  96  |  35<H>  ----------------------------<  182<H>  4.7   |  29  |  1.51<H>    GFR 49    Ca    9.3      08 Oct 2018 09:04    TPro  7.1  /  Alb  4.2  /  TBili  0.4  /  DBili  <0.2  /  AST  23  /  ALT  33  /  AlkPhos  85  10-08    Cholesterol, Serum: 190 mg/dL (10-08 @ 09:04)  HDL Cholesterol, Serum: 40 mg/dL (10-08 @ 09:04)  Triglycerides, Serum: 126 mg/dL (10-08 @ 09:04)  Direct LDL: 125 mg/dL (10-08 @ 09:04)    C-Reactive Protein, Serum: 0.62 mg/dL (10-08 @ 09:04)    Hemoglobin A1C, Whole Blood: 9.2 % (10-08-18 @ 09:04)    Assessment:   Family Hx premature CAD: adopted - his biological mother  of MI at 59 years.   Tobacco usage: denies  ETOH: denies  Exercise regimen: active at work, but mostly sedentary given need for L knee replacement.   Sleep: ~6-10 hours/night, (+) snoring, no previous TIAGO w/u.     24-Hour Diet Recall:  Breakfast: [employer] quiche or oatmeal  Lunch: sandwiches, fruit  Dinner: eats light dinner  Snacks/dessert: tries not to snack too often given DM.       ASSESSMENT/RECOMMENDATIONS: 	  Summary:   62 year old male with history of HTN, HLD, hyperkalemia, chronic CHFrEF, EF 25%, IDDM, depression, and CAD s/p robotic MIDCAB (LIMA- LAD) now s/p staged intervention of residual RCA disease. Consulted for preventive cardiology.     RECOMMENDATIONS:   Anti-platelet Therapy: DAPT per interventionalist recommendation.   Lipid Therapy: , not at goal for CV/DM diagnosis. Recommend increasing Crestor to 20mg - watch for myalgias. Continue Zetia and Omega 3's.   Beta Blocker Therapy: Continue current therapy per your discretion. Consider a long acting BB for ease of dosing  Diet: Low-sodium, low-carbohydrate, Mediterranean diet. Written instruction on the Mediterranean diet was provided. Discussed sodium restrictions given reduced EF.   Exercise: 30-45 minutes most days of the week once cleared to do so by their referring cardiologist. Recommend cardiac rehab.   Stress Management: Instruction on mindfulness meditation was provided.   Sleep: Outpatient sleep study may be beneficial given snoring and anatomy.   Other: A1c 9.2 -- pt had an initial appointment with Dr. Cormier in Endo last week, and has a f/u appt on 10/19. Recommend dual therapy for better control/CV risk reduction as A1C has not improved on insulin alone. Refer to Endocrinology.     Thank you for the opportunity to see this patient. Please feel free to contact Prevention if there are any questions, or if you feel that your patient would benefit from continued follow-up visits with the Program.

## 2018-10-08 NOTE — PATIENT PROFILE ADULT - NSPROEDALEARNPREF_GEN_A_NUR
AMBULATORY PROGRESS NOTE      Patient: Leti Sweet             MRN: [id number]     SSN: [id number] Body mass index is 34.8 kg/(m^2). YOB: 1978     AGE: 42 y.o. EX: female    PCP: Karl Mukherjee MD    IMPRESSION/DIAGNOSIS AND TREATMENT PLAN     DIAGNOSES  1. Tear of peroneal tendon, right, initial encounter    2. Tendinitis of right peroneus brevis tendon    3. Pre-operative cardiovascular examination    4. Pre-operative respiratory examination    5. Blood tests prior to treatment or procedure    6. Preop examination        No orders of the defined types were placed in this encounter. Leti Sweet understands her diagnoses and the proposed plan. As such, my overall impression is a 53-year-old female who describes having long-standing pain and discomfort to the lateral portion of her right hindfoot, chronic tendinitis. Her history is listed as below. She brought an MRI with her, as this was ordered by Marisa Lou M.D., an orthopedic surgeon, who saw her and evaluated her for ankle pain. He ordered an MRI, which was done on May 8, 2018. I did review the MRI. This was done at [address] [place] [address]. It revealed:    No evidence of acute fracture or dislocation. There is subtle abnormality to the peroneal brevis tendon concerning for tendinosis, and/or a short segment, longitudinal tear to the peroneal brevis tendon. It showed a small tibial effusion and small subtalar joint effusion. The findings are suggestive of a chronic plantar fasciitis also but no acute component of plantar fasciitis. In reviewing her MRI with and examining her, she has tenderness along the posterolateral border of her fibula along the peroneal tendons and had some swelling in this region, although it is mild at this current time. She has been using an [employer] Air[employer] brace.   Because of this degenerative signal or abnormal signal, recommendation is for the following:    Exploration of the right peroneal brevis and longus tendons, tenosynovectomy of the peroneal brevis and longus tendons assessment of the peroneal brevis tendon and repair should there be an interstitial segment tear of the peroneal brevis tendon and excisional debridement, primary repair of the peroneal brevis tendon, possible tenodesis of the peroneal brevis and to the peroneal longus tendon. She understands this is what I recommend for her. Also, because she has decreased monofilament testing on her right foot, dorsal, plantar, and in the DPN nerve distribution, as well as along the toes and plantar portion of her right forefoot along the medial plantar nerve distribution, recommendation is for EMG nerve conduction studies of her bilateral lower extremities. We will also get some blood work as well on her. Examination is listed as below. Plan:    1) Pre-Op Lab Tests: CBC w/ diff, CMP, PT INR, EKG, CXR, UA w/ reflex, Urine Culture, UDS, ESR, CRP, CCP, BRIDGET. 2) Follow up with Larry Mcmanus for scheduling. 3) EMG/NCS of the BLE. 4) DME Order: Right Short CAM walker boot. 5) Work Note. RTO - Surgical Scheduling. HPI AND EXAMINATION     Aura Campbell IS A 42 y.o. female who presents to my outpatient office complaining of foot pain. Patient arrives to the office using an air splint. She reports having chronic tendinitis that has been present for about 15 years. Additionally, she reports having instability in her ankles. On 4/7 she was evaluated in the ED after leaving work due to right ankle pain. Imaging was done, she brought MRI results to the office today. Recently, she went to a graduation this weekend and notes her ankles became swollen at the end of the day. Denies having any heart, lungs, or kidney issues. MR imaging findings have been reviewed with the patient that showed a possible tear of one of her peroneal tendons. She denies h/o fibromyalgia or other musculoskeletal disorders.  Intermittently, she can experience numbness along her right foot. Right ANKLE and FOOT       Gait: slow  Tenderness: Moderate tenderness along her noninsertional Achilles' tendon (8 cm from insertional point with ankle in full DF)    Moderate tenderness to peroneal tendons. Cutaneous: No rashes, skin patches, wounds, or abrasions to the lower legs           Warm and Normal color. No regions of expressible drainage. Medial Border of Tibia Region: absent           Skin color, texture, turgor normal. Normal.           Mild swelling to the PL fibula. Joint Motion: ROM Ankle:Normal , Hindfoot: (ST,TN,CC Normal}, Forefoot toes:Normal  Neurologic Exam: Neuro: Motor: normal 5/5 strength in all tested muscle groups             Sensory : diminished sensation on monofilament test.  Contractures: Gastrocnemius or Achilles Contractures absent  Joint / Tendon Stability: Not tested due to guarding. Alignment: Normal foot alignment and  Flexible  Vascular: Normal Pulses/ NL Capillary refill, No evidence of DVT seen on physical exam.   No calf swelling, no tenderness to calf muscles. Lymphatic:  No Evidence of Lymphedema. Left ANKLE and FOOT       Gait: normal  Tenderness: no TTP at this time. Cutaneous: No rashes, skin patches, wounds, or abrasions to the lower legs           Warm and Normal color. No regions of expressible drainage. Medial Border of Tibia Region: absent           Skin color, texture, turgor normal. Normal.  Joint Motion: ROM Ankle:Normal , Hindfoot: (ST,TN,CC Normal}, Forefoot toes:Normal  Neurologic Exam: Neuro: Motor: normal 5/5 strength in all tested muscle groups and Sensory : no sensory deficits noted. Contractures: Gastrocnemius or Achilles Contractures absent  Joint / Tendon Stability: Not tested due to guarding.   Alignment:  Normal Foot Alignment and  Flexible  Vascular: Normal Pulses/ NL Capillary refill, No evidence of DVT seen on physical exam.   No calf swelling, no tenderness to calf muscles. Lymphatic:  No Evidence of Lymphedema. CHART REVIEW     Past Medical History:   Diagnosis Date    Anemia     Chronic pain     knees and ankles    GERD (gastroesophageal reflux disease)     during pregancy    Headache     Hemorrhoid     Hypercholesterolemia     Migraine headache     SIFUENTES (nonalcoholic steatohepatitis) (per outside records) 6/11/2017    Tendinitis      Current Outpatient Prescriptions   Medication Sig    multivitamin (ONE A DAY) tablet Take 1 Tab by mouth daily.  naproxen (NAPROSYN) 500 mg tablet Take 1 Tab by mouth two (2) times daily (with meals).  cholecalciferol, VITAMIN D3, (VITAMIN D3) 5,000 unit tab tablet Take  by mouth every Wednesday. No current facility-administered medications for this visit. Allergies   Allergen Reactions    Pcn [Penicillins] Hives, Swelling and Other (comments)     Throat closes    Shellfish Derived Hives, Swelling and Other (comments)     Throat closes     No past surgical history on file. Social History     Occupational History    Not on file.      Social History Main Topics    Smoking status: Never Smoker    Smokeless tobacco: Never Used    Alcohol use Yes      Comment: rare    Drug use: No    Sexual activity: Not on file     Family History   Problem Relation Age of Onset    Diabetes Mother     Hypertension Mother    [facility] Bipolar Disorder Mother     Elevated Lipids Father     COPD Maternal Grandmother     Emphysema Maternal Grandmother     No Known Problems Maternal Grandfather     Stroke Paternal Grandmother     No Known Problems Paternal Grandfather     Allergic Rhinitis Daughter     Eczema Daughter     Headache Daughter         REVIEW OF SYSTEMS : 5/15/2018  ALL BELOW ARE Negative except : SEE HPI     CONSTITUTIONAL: denies chills, fatigue, fever, weight change   PSYCH: denies anxiety, depression, irritability or mood swings   ENT: denies - headaches, hearing change, nasal congestion, oral lesions, or sore throat   HEM/ONC denies - bleeding problems, bruising, pallor or swollen lymph nodes   ENDO: denies hot flashes, polydipsia/polyuria or temperature intolerance   RESP: denies - cough, shortness of breath or wheezing   CV: denies - chest pain, edema or palpitations, GR  GI: denies - abdominal pain, change in bowel habits, constipation, diarrhea or nausea/vomiting   : denies - dysuria, hematuria, incontinence, pelvic pain   MSK: denies  - See HPI. NEURO: denies - confusion, headaches, seizures or weakness   DERM: denies - dry skin, hair changes, rash or skin lesion changes  VASCULAR: Peripheral Vascular: No calf pain, vascular vein tenderness to calf pain              No calf throbbing, posterior knee throbbing pain     DIAGNOSTIC IMAGING     No notes on file    Written by Arely Khanna, as dictated by Marichuy Hester MD. I, , Marichuy Hester MD, confirm that all documentation is accurate. individual instruction/written material

## 2018-10-09 ENCOUNTER — TRANSCRIPTION ENCOUNTER (OUTPATIENT)
Age: 63
End: 2018-10-09

## 2018-10-09 VITALS — TEMPERATURE: 98 F

## 2018-10-09 LAB
ANION GAP SERPL CALC-SCNC: 11 MMOL/L — SIGNIFICANT CHANGE UP (ref 5–17)
BUN SERPL-MCNC: 28 MG/DL — HIGH (ref 7–23)
CALCIUM SERPL-MCNC: 9.2 MG/DL — SIGNIFICANT CHANGE UP (ref 8.4–10.5)
CHLORIDE SERPL-SCNC: 99 MMOL/L — SIGNIFICANT CHANGE UP (ref 96–108)
CO2 SERPL-SCNC: 26 MMOL/L — SIGNIFICANT CHANGE UP (ref 22–31)
CREAT SERPL-MCNC: 1.05 MG/DL — SIGNIFICANT CHANGE UP (ref 0.5–1.3)
GLUCOSE BLDC GLUCOMTR-MCNC: 235 MG/DL — HIGH (ref 70–99)
GLUCOSE BLDC GLUCOMTR-MCNC: 79 MG/DL — SIGNIFICANT CHANGE UP (ref 70–99)
GLUCOSE SERPL-MCNC: 122 MG/DL — HIGH (ref 70–99)
HCT VFR BLD CALC: 38.6 % — LOW (ref 39–50)
HGB BLD-MCNC: 12.4 G/DL — LOW (ref 13–17)
MAGNESIUM SERPL-MCNC: 2.2 MG/DL — SIGNIFICANT CHANGE UP (ref 1.6–2.6)
MCHC RBC-ENTMCNC: 26.1 PG — LOW (ref 27–34)
MCHC RBC-ENTMCNC: 32.1 G/DL — SIGNIFICANT CHANGE UP (ref 32–36)
MCV RBC AUTO: 81.1 FL — SIGNIFICANT CHANGE UP (ref 80–100)
PLATELET # BLD AUTO: 268 K/UL — SIGNIFICANT CHANGE UP (ref 150–400)
POTASSIUM SERPL-MCNC: 4.2 MMOL/L — SIGNIFICANT CHANGE UP (ref 3.5–5.3)
POTASSIUM SERPL-SCNC: 4.2 MMOL/L — SIGNIFICANT CHANGE UP (ref 3.5–5.3)
RBC # BLD: 4.76 M/UL — SIGNIFICANT CHANGE UP (ref 4.2–5.8)
RBC # FLD: 15.5 % — SIGNIFICANT CHANGE UP (ref 10.3–16.9)
SODIUM SERPL-SCNC: 136 MMOL/L — SIGNIFICANT CHANGE UP (ref 135–145)
WBC # BLD: 8.8 K/UL — SIGNIFICANT CHANGE UP (ref 3.8–10.5)
WBC # FLD AUTO: 8.8 K/UL — SIGNIFICANT CHANGE UP (ref 3.8–10.5)

## 2018-10-09 RX ORDER — ASPIRIN/CALCIUM CARB/MAGNESIUM 324 MG
1 TABLET ORAL
Qty: 30 | Refills: 11 | OUTPATIENT
Start: 2018-10-09 | End: 2019-10-03

## 2018-10-09 RX ORDER — CLOPIDOGREL BISULFATE 75 MG/1
1 TABLET, FILM COATED ORAL
Qty: 30 | Refills: 11 | OUTPATIENT
Start: 2018-10-09 | End: 2019-10-03

## 2018-10-09 RX ORDER — ROSUVASTATIN CALCIUM 5 MG/1
1 TABLET ORAL
Qty: 30 | Refills: 3 | OUTPATIENT
Start: 2018-10-09 | End: 2019-02-05

## 2018-10-09 RX ADMIN — Medication 37.5 MILLIGRAM(S): at 07:32

## 2018-10-09 RX ADMIN — DULOXETINE HYDROCHLORIDE 90 MILLIGRAM(S): 30 CAPSULE, DELAYED RELEASE ORAL at 11:07

## 2018-10-09 RX ADMIN — Medication 20 MILLIGRAM(S): at 11:06

## 2018-10-09 RX ADMIN — CLOPIDOGREL BISULFATE 75 MILLIGRAM(S): 75 TABLET, FILM COATED ORAL at 11:07

## 2018-10-09 RX ADMIN — Medication 81 MILLIGRAM(S): at 11:07

## 2018-10-09 RX ADMIN — Medication 40 MILLIGRAM(S): at 06:11

## 2018-10-09 NOTE — DISCHARGE NOTE ADULT - MEDICATION SUMMARY - MEDICATIONS TO TAKE
I will START or STAY ON the medications listed below when I get home from the hospital:    aspirin 81 mg oral delayed release tablet  -- 1 tab(s) by mouth once a day  -- Indication: For Coronary artery disease, keeps stent open    Cymbalta  -- 90 milligram(s) by mouth once a day  -- Indication: For antidepressant    Paxil 20 mg oral tablet  -- 1 tab(s) by mouth once a day  -- Indication: For antidepressant    Crestor 20 mg oral tablet  -- 1 tab(s) by mouth once a day (at bedtime)   -- Do not take dairy products, antacids, or iron preparations within one hour of this medication.  Do not take this drug if you are pregnant.  It is very important that you take or use this exactly as directed.  Do not skip doses or discontinue unless directed by your doctor.    -- Indication: For Cholesterol    Zetia 10 mg oral tablet  -- 1 tab(s) by mouth once a day  -- Indication: For Cholesterol    clopidogrel 75 mg oral tablet  -- 1 tab(s) by mouth once a day  -- Indication: For Coronary artery disease, keeps stent open    Metoprolol Tartrate 37.5 mg oral tablet  -- 1 tab(s) by mouth 2 times a day  -- Indication: For heart rate    Lasix 40 mg oral tablet  -- 1 tab(s) by mouth once a day  -- Indication: For diuretic     Omega-3 oral capsule  -- 1 tab(s) by mouth once a day  -- Indication: For supplement     hydrALAZINE 100 mg oral tablet  -- orally once a day  -- Indication: For blood pressure

## 2018-10-09 NOTE — DISCHARGE NOTE ADULT - PATIENT PORTAL LINK FT
You can access the [facility] Patient Portal, offered by [facility], by registering with the following website: [email or web address]

## 2018-10-09 NOTE — DISCHARGE NOTE ADULT - CARE PROVIDERS DIRECT ADDRESSES
[email or web address]

## 2018-10-09 NOTE — DISCHARGE NOTE ADULT - PLAN OF CARE
You do not need to keep this area covered and you may shower  Please avoid any heavy lifting  (no more than 3 to 5 lbs) or strenuous activity for five days  If you develop any swelling, bleeding, hardening of the skin (hematoma formation), acute pain, numbness/tingling  in your arm please contact your doctor immediately or call our 24/7 line: [phone]    NEVER MISS A DOSE OF ASPIRIN OR PLAVIX; IF YOU DO, YOU ARE AT RISK OF YOUR STENT CLOSING AND HAVING A HEART ATTACK. DO NOT STOP THESE TWO MEDICATIONS UNLESS INSTRUCTED TO DO SO BY YOUR CARDIOLOGIST Please continue aspirin 81 mg daily and plavix 75 mg daily. You underwent a cardiac catheterization and received a stent to the right coronary artery. The procedure was done through the right wrist. You do not need to keep this area covered and you may shower  Please avoid any heavy lifting  (no more than 3 to 5 lbs) or strenuous activity for five days  If you develop any swelling, bleeding, hardening of the skin (hematoma formation), acute pain, numbness/tingling  in your arm please contact your doctor immediately or call our 24/7 line: [phone]    NEVER MISS A DOSE OF ASPIRIN OR PLAVIX; IF YOU DO, YOU ARE AT RISK OF YOUR STENT CLOSING AND HAVING A HEART ATTACK. DO NOT STOP THESE TWO MEDICATIONS UNLESS INSTRUCTED TO DO SO BY YOUR CARDIOLOGIST    Please follow up with Dr. Caldera at your scheduled follow up appointment Please continue furosemide 40 mg oral daily You have a history of weakened heart muscle called congestive heart failure.   Please make sure you follow up with your cardiologist within one week of discharge.   Please weigh yourself daily: if you have gained more than 2-3 lbs in one day or 5 lbs in one week contact your doctor immediately as you may be retaining water weight  In addition, restrict your salt intake to less than 2 grams a day  If you develop worsening shortening of breath, leg swelling, fatigue, chest pain, difficulty sleeping at night due to shortness of breath, contact your cardiologist immediately. Please stop crestor 10 mg oral daily and start crestor 20 mg oral daily Please continue hydralazine 100mg oral daily, metoprolol tartrate 37.5 mg twice daily

## 2018-10-09 NOTE — DISCHARGE NOTE ADULT - CARE PROVIDER_API CALL
Lilia Caldera (MD), Cardiovascular Disease; Internal Medicine  [address]  [address] [place] [address]  Phone: [phone]  Fax: [phone]

## 2018-10-09 NOTE — DISCHARGE NOTE ADULT - CARE PLAN
Assessment and plan of treatment:	You do not need to keep this area covered and you may shower  Please avoid any heavy lifting  (no more than 3 to 5 lbs) or strenuous activity for five days  If you develop any swelling, bleeding, hardening of the skin (hematoma formation), acute pain, numbness/tingling  in your arm please contact your doctor immediately or call our 24/7 line: [phone]    NEVER MISS A DOSE OF ASPIRIN OR PLAVIX; IF YOU DO, YOU ARE AT RISK OF YOUR STENT CLOSING AND HAVING A HEART ATTACK. DO NOT STOP THESE TWO MEDICATIONS UNLESS INSTRUCTED TO DO SO BY YOUR CARDIOLOGIST Principal Discharge DX:	Coronary artery disease  Goal:	Please continue aspirin 81 mg daily and plavix 75 mg daily.  Assessment and plan of treatment:	You underwent a cardiac catheterization and received a stent to the right coronary artery. The procedure was done through the right wrist. You do not need to keep this area covered and you may shower  Please avoid any heavy lifting  (no more than 3 to 5 lbs) or strenuous activity for five days  If you develop any swelling, bleeding, hardening of the skin (hematoma formation), acute pain, numbness/tingling  in your arm please contact your doctor immediately or call our 24/7 line: [phone]    NEVER MISS A DOSE OF ASPIRIN OR PLAVIX; IF YOU DO, YOU ARE AT RISK OF YOUR STENT CLOSING AND HAVING A HEART ATTACK. DO NOT STOP THESE TWO MEDICATIONS UNLESS INSTRUCTED TO DO SO BY YOUR CARDIOLOGIST    Please follow up with Dr. Caldera at your scheduled follow up appointment  Secondary Diagnosis:	Chronic systolic CHF (congestive heart failure)  Goal:	Please continue furosemide 40 mg oral daily  Assessment and plan of treatment:	You have a history of weakened heart muscle called congestive heart failure.   Please make sure you follow up with your cardiologist within one week of discharge.   Please weigh yourself daily: if you have gained more than 2-3 lbs in one day or 5 lbs in one week contact your doctor immediately as you may be retaining water weight  In addition, restrict your salt intake to less than 2 grams a day  If you develop worsening shortening of breath, leg swelling, fatigue, chest pain, difficulty sleeping at night due to shortness of breath, contact your cardiologist immediately.  Secondary Diagnosis:	HLD (hyperlipidemia)  Goal:	Please stop crestor 10 mg oral daily and start crestor 20 mg oral daily  Secondary Diagnosis:	HTN (hypertension)  Goal:	Please continue hydralazine 100mg oral daily, metoprolol tartrate 37.5 mg twice daily

## 2018-10-09 NOTE — DISCHARGE NOTE ADULT - HOSPITAL COURSE
62 year old male with history of HTN, HLD, hyperkalemia, chronic CHFrEF, EF 25%, IDDM (insulin pump), depression, and CAD s/p robotic MIDCAB (SCHWARTZ- LAD) with Dr. Nuno 06/2018 s/p most recent cardiac cath 05/2018 revealing normal LM, 99% oLAD, 100% D1 ISR with L-L collaterals, 30% OM1, 75% dRCA IFR 0.85; EF 20% and EDP 25 mmHg who now presents for staged intervention of residual RCA disease. Pt. initially presented at [facility] 5/2018 for pre-op clearance for right total knee replacement. He underwent cardiac cath which revealed above findings and then underwent MIDCAB (SCHWARTZ-LAD) with Dr. Nuno. Since his procedure; pt. reports feeling great; he states that he does not have any CP, SOB, dizziness, diaphoresis, palpitations, fatigue, LE edema, orthopnea, PND, syncope. However; he states that his exercise tolerance is limited by his knee pain. Pt. states that he is planning to get knee surgery on DEC of this year or Jan 2019. Due to pts risk factors,and known residual RCA disease, pt is referred for staged intervention of RCA disease.  Pt is now s/p cardiac cath with VERONICA dRCA, LIMA-LAD patent, EF 20-25% by prior cath. L radial access, snuffbox stable. Pt admitted to [address] [place] [address] overnight for observation. Pt without events overnight, VSS, no events on telemetry. L radial access site stable, no bleeding, hematoma, distal pulse intact. Pt will be discharged on aspirin 81 mg daily, plavix 75 mg, furosemide 40 mg daily, hydralazine 100 mg daily, metoprolol tartrate 37.5 mg daily, crestor 10 mg increased to 20 mg on discharge. Pt deemed stable for discharge per  and will follow up with Dr. Caldera at his scheduled appointment.

## 2018-10-10 ENCOUNTER — INPATIENT (INPATIENT)
Facility: HOSPITAL | Age: 63
LOS: 2 days | Discharge: HOME | End: 2018-10-13
Attending: INTERNAL MEDICINE | Admitting: INTERNAL MEDICINE

## 2018-10-10 VITALS
OXYGEN SATURATION: 100 % | HEART RATE: 90 BPM | TEMPERATURE: 98 F | DIASTOLIC BLOOD PRESSURE: 75 MMHG | RESPIRATION RATE: 18 BRPM | SYSTOLIC BLOOD PRESSURE: 143 MMHG

## 2018-10-10 DIAGNOSIS — Z95.5 PRESENCE OF CORONARY ANGIOPLASTY IMPLANT AND GRAFT: Chronic | ICD-10-CM

## 2018-10-10 LAB
ALBUMIN SERPL ELPH-MCNC: 4.2 G/DL — SIGNIFICANT CHANGE UP (ref 3.5–5.2)
ALP SERPL-CCNC: 91 U/L — SIGNIFICANT CHANGE UP (ref 30–115)
ALT FLD-CCNC: 32 U/L — SIGNIFICANT CHANGE UP (ref 0–41)
ANION GAP SERPL CALC-SCNC: 15 MMOL/L — HIGH (ref 7–14)
APTT BLD: 29.2 SEC — SIGNIFICANT CHANGE UP (ref 27–39.2)
AST SERPL-CCNC: 24 U/L — SIGNIFICANT CHANGE UP (ref 0–41)
BILIRUB SERPL-MCNC: 0.4 MG/DL — SIGNIFICANT CHANGE UP (ref 0.2–1.2)
BUN SERPL-MCNC: 29 MG/DL — HIGH (ref 10–20)
CALCIUM SERPL-MCNC: 8.9 MG/DL — SIGNIFICANT CHANGE UP (ref 8.5–10.1)
CHLORIDE SERPL-SCNC: 99 MMOL/L — SIGNIFICANT CHANGE UP (ref 98–110)
CK MB CFR SERPL CALC: 12.7 NG/ML — HIGH (ref 0.6–6.3)
CK SERPL-CCNC: 348 U/L — HIGH (ref 0–225)
CO2 SERPL-SCNC: 25 MMOL/L — SIGNIFICANT CHANGE UP (ref 17–32)
CREAT SERPL-MCNC: 1.2 MG/DL — SIGNIFICANT CHANGE UP (ref 0.7–1.5)
GLUCOSE BLDC GLUCOMTR-MCNC: 299 MG/DL — HIGH (ref 70–99)
GLUCOSE BLDC GLUCOMTR-MCNC: 304 MG/DL — HIGH (ref 70–99)
GLUCOSE BLDC GLUCOMTR-MCNC: 334 MG/DL — HIGH (ref 70–99)
GLUCOSE SERPL-MCNC: 321 MG/DL — HIGH (ref 70–99)
INR BLD: 1.13 RATIO — SIGNIFICANT CHANGE UP (ref 0.65–1.3)
POTASSIUM SERPL-MCNC: 4.8 MMOL/L — SIGNIFICANT CHANGE UP (ref 3.5–5)
POTASSIUM SERPL-SCNC: 4.8 MMOL/L — SIGNIFICANT CHANGE UP (ref 3.5–5)
PROT SERPL-MCNC: 6.9 G/DL — SIGNIFICANT CHANGE UP (ref 6–8)
PROTHROM AB SERPL-ACNC: 12.2 SEC — SIGNIFICANT CHANGE UP (ref 9.95–12.87)
SODIUM SERPL-SCNC: 139 MMOL/L — SIGNIFICANT CHANGE UP (ref 135–146)
TROPONIN T SERPL-MCNC: 0.12 NG/ML — CRITICAL HIGH

## 2018-10-10 RX ORDER — DEXTROSE 50 % IN WATER 50 %
12.5 SYRINGE (ML) INTRAVENOUS ONCE
Qty: 0 | Refills: 0 | Status: DISCONTINUED | OUTPATIENT
Start: 2018-10-10 | End: 2018-10-13

## 2018-10-10 RX ORDER — TICAGRELOR 90 MG/1
90 TABLET ORAL
Qty: 0 | Refills: 0 | Status: DISCONTINUED | OUTPATIENT
Start: 2018-10-10 | End: 2018-10-13

## 2018-10-10 RX ORDER — MORPHINE SULFATE 50 MG/1
4 CAPSULE, EXTENDED RELEASE ORAL ONCE
Qty: 0 | Refills: 0 | Status: DISCONTINUED | OUTPATIENT
Start: 2018-10-10 | End: 2018-10-10

## 2018-10-10 RX ORDER — SODIUM CHLORIDE 9 MG/ML
1000 INJECTION INTRAMUSCULAR; INTRAVENOUS; SUBCUTANEOUS
Qty: 0 | Refills: 0 | Status: DISCONTINUED | OUTPATIENT
Start: 2018-10-10 | End: 2018-10-12

## 2018-10-10 RX ORDER — EPTIFIBATIDE 2 MG/ML
2 INJECTION, SOLUTION INTRAVENOUS
Qty: 75 | Refills: 0 | Status: DISCONTINUED | OUTPATIENT
Start: 2018-10-10 | End: 2018-10-10

## 2018-10-10 RX ORDER — TICAGRELOR 90 MG/1
180 TABLET ORAL ONCE
Qty: 0 | Refills: 0 | Status: COMPLETED | OUTPATIENT
Start: 2018-10-10 | End: 2018-10-10

## 2018-10-10 RX ORDER — HYDRALAZINE HCL 50 MG
50 TABLET ORAL EVERY 12 HOURS
Qty: 0 | Refills: 0 | Status: DISCONTINUED | OUTPATIENT
Start: 2018-10-10 | End: 2018-10-11

## 2018-10-10 RX ORDER — DEXTROSE 50 % IN WATER 50 %
15 SYRINGE (ML) INTRAVENOUS ONCE
Qty: 0 | Refills: 0 | Status: DISCONTINUED | OUTPATIENT
Start: 2018-10-10 | End: 2018-10-13

## 2018-10-10 RX ORDER — GLUCAGON INJECTION, SOLUTION 0.5 MG/.1ML
1 INJECTION, SOLUTION SUBCUTANEOUS ONCE
Qty: 0 | Refills: 0 | Status: DISCONTINUED | OUTPATIENT
Start: 2018-10-10 | End: 2018-10-13

## 2018-10-10 RX ORDER — METOPROLOL TARTRATE 50 MG
50 TABLET ORAL
Qty: 0 | Refills: 0 | Status: DISCONTINUED | OUTPATIENT
Start: 2018-10-10 | End: 2018-10-12

## 2018-10-10 RX ORDER — OMEGA-3 ACID ETHYL ESTERS 1 G
4 CAPSULE ORAL DAILY
Qty: 0 | Refills: 0 | Status: DISCONTINUED | OUTPATIENT
Start: 2018-10-10 | End: 2018-10-13

## 2018-10-10 RX ORDER — ONDANSETRON 8 MG/1
4 TABLET, FILM COATED ORAL ONCE
Qty: 0 | Refills: 0 | Status: COMPLETED | OUTPATIENT
Start: 2018-10-10 | End: 2018-10-10

## 2018-10-10 RX ORDER — ASPIRIN/CALCIUM CARB/MAGNESIUM 324 MG
81 TABLET ORAL DAILY
Qty: 0 | Refills: 0 | Status: DISCONTINUED | OUTPATIENT
Start: 2018-10-10 | End: 2018-10-13

## 2018-10-10 RX ORDER — FUROSEMIDE 40 MG
40 TABLET ORAL DAILY
Qty: 0 | Refills: 0 | Status: DISCONTINUED | OUTPATIENT
Start: 2018-10-10 | End: 2018-10-13

## 2018-10-10 RX ORDER — INSULIN LISPRO 100/ML
5 VIAL (ML) SUBCUTANEOUS
Qty: 0 | Refills: 0 | Status: DISCONTINUED | OUTPATIENT
Start: 2018-10-10 | End: 2018-10-11

## 2018-10-10 RX ORDER — TICAGRELOR 90 MG/1
90 TABLET ORAL ONCE
Qty: 0 | Refills: 0 | Status: COMPLETED | OUTPATIENT
Start: 2018-10-10 | End: 2018-10-10

## 2018-10-10 RX ORDER — ATORVASTATIN CALCIUM 80 MG/1
80 TABLET, FILM COATED ORAL AT BEDTIME
Qty: 0 | Refills: 0 | Status: DISCONTINUED | OUTPATIENT
Start: 2018-10-10 | End: 2018-10-13

## 2018-10-10 RX ORDER — DEXTROSE 50 % IN WATER 50 %
25 SYRINGE (ML) INTRAVENOUS ONCE
Qty: 0 | Refills: 0 | Status: DISCONTINUED | OUTPATIENT
Start: 2018-10-10 | End: 2018-10-13

## 2018-10-10 RX ORDER — SODIUM CHLORIDE 9 MG/ML
1000 INJECTION, SOLUTION INTRAVENOUS
Qty: 0 | Refills: 0 | Status: DISCONTINUED | OUTPATIENT
Start: 2018-10-10 | End: 2018-10-13

## 2018-10-10 RX ORDER — METOPROLOL TARTRATE 50 MG
25 TABLET ORAL
Qty: 0 | Refills: 0 | Status: DISCONTINUED | OUTPATIENT
Start: 2018-10-10 | End: 2018-10-10

## 2018-10-10 RX ORDER — INSULIN GLARGINE 100 [IU]/ML
12 INJECTION, SOLUTION SUBCUTANEOUS AT BEDTIME
Qty: 0 | Refills: 0 | Status: DISCONTINUED | OUTPATIENT
Start: 2018-10-10 | End: 2018-10-11

## 2018-10-10 RX ORDER — DULOXETINE HYDROCHLORIDE 30 MG/1
90 CAPSULE, DELAYED RELEASE ORAL DAILY
Qty: 0 | Refills: 0 | Status: DISCONTINUED | OUTPATIENT
Start: 2018-10-10 | End: 2018-10-13

## 2018-10-10 RX ADMIN — TICAGRELOR 90 MILLIGRAM(S): 90 TABLET ORAL at 21:44

## 2018-10-10 RX ADMIN — Medication 5 UNIT(S): at 19:31

## 2018-10-10 RX ADMIN — ATORVASTATIN CALCIUM 80 MILLIGRAM(S): 80 TABLET, FILM COATED ORAL at 21:36

## 2018-10-10 RX ADMIN — SODIUM CHLORIDE 100 MILLILITER(S): 9 INJECTION INTRAMUSCULAR; INTRAVENOUS; SUBCUTANEOUS at 07:06

## 2018-10-10 RX ADMIN — EPTIFIBATIDE 18.86 MICROGRAM(S)/KG/MIN: 2 INJECTION, SOLUTION INTRAVENOUS at 07:02

## 2018-10-10 RX ADMIN — MORPHINE SULFATE 4 MILLIGRAM(S): 50 CAPSULE, EXTENDED RELEASE ORAL at 04:38

## 2018-10-10 RX ADMIN — TICAGRELOR 180 MILLIGRAM(S): 90 TABLET ORAL at 04:15

## 2018-10-10 RX ADMIN — Medication 5 UNIT(S): at 09:28

## 2018-10-10 RX ADMIN — EPTIFIBATIDE 18.86 MICROGRAM(S)/KG/MIN: 2 INJECTION, SOLUTION INTRAVENOUS at 18:53

## 2018-10-10 RX ADMIN — INSULIN GLARGINE 12 UNIT(S): 100 INJECTION, SOLUTION SUBCUTANEOUS at 21:36

## 2018-10-10 RX ADMIN — ONDANSETRON 4 MILLIGRAM(S): 8 TABLET, FILM COATED ORAL at 18:57

## 2018-10-10 RX ADMIN — EPTIFIBATIDE 18.86 MICROGRAM(S)/KG/MIN: 2 INJECTION, SOLUTION INTRAVENOUS at 20:00

## 2018-10-10 RX ADMIN — TICAGRELOR 90 MILLIGRAM(S): 90 TABLET ORAL at 21:29

## 2018-10-10 NOTE — H&P ADULT - NSHPPHYSICALEXAM_GEN_ALL_CORE
General: Normal, in cath lab	  Cardiovascular: Normal S1 S2, RRR  Respiratory: Lungs clear to auscultation	  Psychiatry: Mood & affect appropriate  Gastrointestinal:  Soft, Non-tender, non distended  Skin: No rashes, No ecchymoses, No cyanosis	  Neurologic: Non-focal, AAO x 3  Musculoskeletal/ extremities: Normal range of motion, No clubbing, cyanosis or edema  Vascular: Peripheral pulses palpable 2+ bilaterally

## 2018-10-10 NOTE — H&P ADULT - NSHPLABSRESULTS_GEN_ALL_CORE
VITALS:   T(F): 97.8  HR: 93  BP: 140/78  RR: 18  SpO2: 100%    LABS:                        12.4   8.8   )-----------( 268      ( 09 Oct 2018 05:41 )             38.6     10-10    139  |  99  |  29<H>  ----------------------------<  321<H>  4.8   |  25  |  1.2    Ca    8.9      10 Oct 2018 04:00  Mg     2.2     10-09    TPro  6.9  /  Alb  4.2  /  TBili  0.4  /  DBili  x   /  AST  24  /  ALT  32  /  AlkPhos  91  10-10    PT/INR - ( 10 Oct 2018 04:00 )   PT: 12.20 sec;   INR: 1.13 ratio         PTT - ( 10 Oct 2018 04:00 )  PTT:29.2 sec      Troponin T, Serum: 0.12 ng/mL <HH> (10-10-18 @ 04:00)  Creatine Kinase, Serum: 348 U/L <H> (10-10-18 @ 04:00)      CARDIAC MARKERS ( 10 Oct 2018 04:00 )  x     / 0.12 ng/mL / 348 U/L / x     / 12.7 ng/mL

## 2018-10-10 NOTE — CONSULT NOTE ADULT - SUBJECTIVE AND OBJECTIVE BOX
Date of Admission: 10/10    CHIEF COMPLAINT: chest pain    HISTORY OF PRESENT ILLNESS: 63yMale with PMH below presented to the hospital for chest pain/ SOB and diaphoresis since this AM. Had cardiac cath with PCI to RCA on 10/8 and was discharged home less than 24 hours ago. He also had a history of PCI to D1 in 2006 which thrombosed 3 days after stent placement. He also had a MICS- CABG one month back. Also was given plavix at that time.     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  Type 2 diabetes mellitus: DM (diabetes mellitus), type 2  History of surgery to heart and great vessels, presenting hazards to health: x 4 in 2005  Atherosclerosis of coronary artery: CAD (coronary artery disease)  Status post percutaneous transluminal coronary angioplasty: in 2012  Stented coronary artery  History of surgery to major organs, presenting hazards to health: heart stents  Other postprocedural status: Fixation hardware in foot    HEALTH ISSUES - PROBLEM Dx:        FAMILY HISTORY:  Family history of heart disease (Mother)    None [ ]  Mother:   Father:   Siblings:     SOCIAL HISTORY:    [ ] Non-smoker  [ ] Smoker  [ ] Alcohol    Allergies    ACE inhibitors (Hives)  enalapril (Hives)    Intolerances    	    REVIEW OF SYSTEMS:  CONSTITUTIONAL: No fever, weight loss, or fatigue  CARDIOLOGY: PAtient denies chest pain, shortness of breath or syncopal episodes.   RESPIRATORY: denies shortness of breath, wheezeing.   NEUROLOGICAL: NO weakness, no focal deficits to report.  ENDOCRINOLOGICAL: no recent change in diabetic medications.   GI: no BRBPR, no N,V,diarrhea.    PSYCHIATRY: normal mood and affect  HEENT: no nasal discharge, no ecchymosis  SKIN: no ecchymosis, no breakdown  MUSCULOSKELETAL: Full range of motion x4.      PHYSICAL EXAM:  T(C): 36.6 (10-10-18 @ 04:20), Max: 37.2 (10-09-18 @ 06:22)  HR: 88 (10-10-18 @ 04:20) (60 - 90)  BP: 153/82 (10-10-18 @ 04:20) (112/55 - 153/82)  RR: 18 (10-10-18 @ 04:20) (16 - 18)  SpO2: 99% (10-10-18 @ 04:20) (96% - 100%)  Wt(kg): --  I&O's Summary    Daily Height in cm: 185.42 (10 Oct 2018 04:17)    Daily     General Appearance: Normal	  Cardiovascular: Normal S1 S2, No JVD, No murmurs, No edema  Respiratory: Lungs clear to auscultation	  Psychiatry: A & O x 3, Mood & affect appropriate  Gastrointestinal:  Soft, Non-tender  Skin: No rashes, No ecchymoses, No cyanosis	  Neurologic: Non-focal  Musculoskeletal/ extremities: Normal range of motion, No clubbing, cyanosis or edema  Vascular: Peripheral pulses palpable 2+ bilaterally    LABS:	 	                          12.4   8.8   )-----------( 268      ( 09 Oct 2018 05:41 )             38.6     10-09    136  |  99  |  28<H>  ----------------------------<  122<H>  4.2   |  26  |  1.05    Ca    9.2      09 Oct 2018 05:41  Mg     2.2     10-09    TPro  7.1  /  Alb  4.2  /  TBili  0.4  /  DBili  <0.2  /  AST  23  /  ALT  33  /  AlkPhos  85  10-08    CARDIAC MARKERS ( 08 Oct 2018 09:04 )  x     / x     / 417 U/L / x     / 13.6 ng/mL      PT/INR - ( 08 Oct 2018 09:04 )   PT: 10.9 sec;   INR: 0.98          PTT - ( 08 Oct 2018 09:04 )  PTT:27.9 sec    CARDIAC MARKERS:            TELEMETRY EVENTS: 	    ECG:  	  RADIOLOGY:  OTHER: 	    PREVIOUS DIAGNOSTIC TESTING:    [ ] Echocardiogram:  [ ]  Catheterization:  [ ] Stress Test:  	  	    Home Medications:  Cymbalta: 90 milligram(s) orally once a day (08 Oct 2018 09:18)  hydrALAZINE 100 mg oral tablet: orally once a day (08 Oct 2018 09:18)  Lasix 40 mg oral tablet: 1 tab(s) orally once a day (08 Oct 2018 09:18)  Metoprolol Tartrate 37.5 mg oral tablet: 1 tab(s) orally 2 times a day (08 Oct 2018 09:18)  Omega-3 oral capsule: 1 tab(s) orally once a day (08 Oct 2018 09:18)  Paxil 20 mg oral tablet: 1 tab(s) orally once a day (08 Oct 2018 09:18)    MEDICATIONS  (STANDING):    MEDICATIONS  (PRN):

## 2018-10-10 NOTE — ED PROVIDER NOTE - PSH
History of surgery to major organs, presenting hazards to health  heart stents  Other postprocedural status  Fixation hardware in foot  Stented coronary artery

## 2018-10-10 NOTE — H&P ADULT - HISTORY OF PRESENT ILLNESS
63 year old Male with pertinent medical history of Hypertension and CAD presented to the hospital for chest pain/ SOB and diaphoresis since the morning of admission. He recenty had cardiac cath with PCI to RCA on 10/8 and was discharged home less than 24 hours ago. He has also had a history of PCI to D1 in 2006 which had thrombosed 3 days after stent placement. He had a MICS- CABG one month back. He was discharged on aspirin and plavix after the recent catheterization.

## 2018-10-10 NOTE — ED PROVIDER NOTE - ATTENDING CONTRIBUTION TO CARE
62 yo M received as prenote for CP in setting of recent PCI --- has hx of HTN, HLD, heart failure, IDDM II, CAD sp CABG and stent x 6, most recently RCA on Monday at [facility].    Woke from sleep with L sided CP radiating to jaw, associated nausea, diaphoresis, dyspnea.     EKG with MITRA inferiorly, no reciprocal changes but markedly different from EKG on 10/8/18.    STEMI note called at 0400 -- Dr. Moya at bedside, brilinta given, patient to CCU via cath lab

## 2018-10-10 NOTE — CONSULT NOTE ADULT - ASSESSMENT
ST Elevation MI  - DAPT  - suspect plavix resistance, given PCI thrombosis x2. will give brilinta  - admit to CCU  - emergent cath  - 2d echo  - lipitor 80 mg QHS  - add BB once vital signs stable and titrate to resting HR of about 60 bpm

## 2018-10-10 NOTE — CHART NOTE - NSCHARTNOTEFT_GEN_A_CORE
POST OPERATIVE PROCEDURAL DOCUMENTATION  PRE-OP DIAGNOSIS: STEMI    POST-OP DIAGNOSIS: STEMI    PROCEDURE:   LEFT HEART CATHERIZATION    Physician: Mihir Lopez MD  Assistant:  Kev AYNEZ    ANESTHESIA TYPE:  [x ] Sedation  [x ] Local/Regional  [  ]General Anesthesia    ESTIMATED BLOOD LOSS:      less than 10 mL    CONDITION  [ x] Good          FINDINGS:    LEFT HEART CATHERIZATION                          FINDINGS: thrombosis of stent in distal RCA placed 36 h ago at [facility]      PERCUTANEOUS CORONARY INTERVENTIONS: PCI to dRCA      COMPLICATIONS:  None      POST-OP DIAGNOSIS: STEMI      RECOMMENDATION:    DAPT  CCU  plavix resistant so would start brilinta  2d echo  statin  resume BB

## 2018-10-10 NOTE — ED PROVIDER NOTE - OBJECTIVE STATEMENT
62 yo male hx of HTN/HLD/DM/CAD s/p CABG and Stent 1 day ago at [facility] for chest pressure started 1 hour PTA. The pressure/pain woke him from sleep. pain radiating to jaw and shoulder. + nausea, weakness. denies diaphoresis/sob. denies HA/dizziness/abd pain/n/v/d/urinary sx.

## 2018-10-10 NOTE — ED PROVIDER NOTE - NS ED ROS FT
Constitutional: no fever, chills, no recent weight loss, change in appetite or malaise  Eyes: no redness/discharge/pain/vision changes  ENT: no rhinorrhea/ear pain/sore throat  Cardiac: see hpi  Respiratory: No cough or respiratory distress  GI: No nausea, vomiting, diarrhea or abdominal pain.  : No dysuria, frequency, urgency or hematuria  MS: no pain to back or extremities, no loss of ROM, no weakness  Neuro: No headache or weakness. No LOC.  Skin: No skin rash.  Endocrine: No history of thyroid disease or diabetes.  Except as documented in the HPI, all other systems are negative.

## 2018-10-10 NOTE — ED ADULT NURSE NOTE - NSIMPLEMENTINTERV_GEN_ALL_ED
Implemented All Universal Safety Interventions:  [place] to call system. Call bell, personal items and telephone within reach. Instruct patient to call for assistance. Room bathroom lighting operational. Non-slip footwear when patient is off stretcher. Physically safe environment: no spills, clutter or unnecessary equipment. Stretcher in lowest position, wheels locked, appropriate side rails in place.

## 2018-10-10 NOTE — ED ADULT NURSE NOTE - OBJECTIVE STATEMENT
Pt presents to ED with c/o chest pain that radiated to the jaw that started this morning. Pt states he was discharged earlier in day from [facility] from RCA placement. Pt took ASA 162mg, Plavix 75mg, and Metoprolol 100mg at home, and given an additional ASA 162mg by EMS. Pt denies SOB, reports chest pain 8/10.

## 2018-10-10 NOTE — ED PROVIDER NOTE - PHYSICAL EXAMINATION
CONSTITUTIONAL: Well-appearing; well-nourished; in no apparent distress.   EYES: PERRL; EOM intact.   CARDIOVASCULAR: Normal S1, S2; no murmurs, rubs, or gallops.   RESPIRATORY: Normal chest excursion with respiration; breath sounds clear and equal bilaterally; no wheezes, rhonchi, or rales.  GI/: Normal bowel sounds; non-distended; non-tender; no palpable organomegaly.   MS: No evidence of trauma or deformity. Non-tender to palpation. Normal ROM in all four extremities; non-tender to palpation; distal pulses are normal.   SKIN: Normal for age and race; warm; dry; good turgor; no apparent lesions or exudate.   NEURO/PSYCH: A & O x 4; grossly unremarkable. mood and manner are appropriate.

## 2018-10-10 NOTE — H&P ADULT - ASSESSMENT
ST Elevation MI  - suspecting plavix resistance, given PCI thrombosis. We have him on aspirin and brilinta for now  - will continue integrelin for a total of 72 hours, atorvastatin 80 mg daily  - admit to CCU  - went for emergent catheterization  - Will order 2d echo  - Will add BB once vital signs stable and titrate to resting HR of about 60 bpm

## 2018-10-10 NOTE — H&P ADULT - ATTENDING COMMENTS
Patient seen and examined independently. Resident's H & P reviewed. Agree with the findings and plan of care except,   A 63 years old male presented to the ER with cp, sob and diaphoresis which started around 3 AM. A day earlier pt had a VERONICA placed in RCA at [facility]. Pt had STEMI and was taken to cath lab.  EKG: NSR @ 86/min. LAD. ST elevation in leads II, III, and avf.    ASSESSMENT:    1. STEMI  2. CAD  3. DM-2  4. Obesity  5. CKD-2  6. HTN/DL  7. COPD    PLAN:    . S/P PCI of RCA.  . CCU  . Cont ASA, Brilinta, and Lipitor  . Cont his other meds  . Monitor FS. Cont Insulin  . Plan for staged PCI

## 2018-10-11 LAB
ANION GAP SERPL CALC-SCNC: 12 MMOL/L — SIGNIFICANT CHANGE UP (ref 7–14)
ANION GAP SERPL CALC-SCNC: 14 MMOL/L — SIGNIFICANT CHANGE UP (ref 7–14)
APPEARANCE UR: CLEAR — SIGNIFICANT CHANGE UP
BILIRUB UR-MCNC: NEGATIVE — SIGNIFICANT CHANGE UP
BUN SERPL-MCNC: 27 MG/DL — HIGH (ref 10–20)
BUN SERPL-MCNC: 28 MG/DL — HIGH (ref 10–20)
CALCIUM SERPL-MCNC: 9 MG/DL — SIGNIFICANT CHANGE UP (ref 8.5–10.1)
CALCIUM SERPL-MCNC: 9.2 MG/DL — SIGNIFICANT CHANGE UP (ref 8.5–10.1)
CHLORIDE SERPL-SCNC: 97 MMOL/L — LOW (ref 98–110)
CHLORIDE SERPL-SCNC: 97 MMOL/L — LOW (ref 98–110)
CO2 SERPL-SCNC: 26 MMOL/L — SIGNIFICANT CHANGE UP (ref 17–32)
CO2 SERPL-SCNC: 28 MMOL/L — SIGNIFICANT CHANGE UP (ref 17–32)
COLOR SPEC: YELLOW — SIGNIFICANT CHANGE UP
CREAT SERPL-MCNC: 1.2 MG/DL — SIGNIFICANT CHANGE UP (ref 0.7–1.5)
CREAT SERPL-MCNC: 1.3 MG/DL — SIGNIFICANT CHANGE UP (ref 0.7–1.5)
DIFF PNL FLD: NEGATIVE — SIGNIFICANT CHANGE UP
GLUCOSE BLDC GLUCOMTR-MCNC: 133 MG/DL — HIGH (ref 70–99)
GLUCOSE BLDC GLUCOMTR-MCNC: 291 MG/DL — HIGH (ref 70–99)
GLUCOSE BLDC GLUCOMTR-MCNC: 310 MG/DL — HIGH (ref 70–99)
GLUCOSE BLDC GLUCOMTR-MCNC: 324 MG/DL — HIGH (ref 70–99)
GLUCOSE BLDC GLUCOMTR-MCNC: 351 MG/DL — HIGH (ref 70–99)
GLUCOSE SERPL-MCNC: 291 MG/DL — HIGH (ref 70–99)
GLUCOSE SERPL-MCNC: 325 MG/DL — HIGH (ref 70–99)
GLUCOSE UR QL: >=1000 MG/DL
HCT VFR BLD CALC: 38.4 % — LOW (ref 42–52)
HGB BLD-MCNC: 12.5 G/DL — LOW (ref 14–18)
KETONES UR-MCNC: NEGATIVE — SIGNIFICANT CHANGE UP
LEUKOCYTE ESTERASE UR-ACNC: NEGATIVE — SIGNIFICANT CHANGE UP
MAGNESIUM SERPL-MCNC: 2.2 MG/DL — SIGNIFICANT CHANGE UP (ref 1.8–2.4)
MCHC RBC-ENTMCNC: 26 PG — LOW (ref 27–31)
MCHC RBC-ENTMCNC: 32.6 G/DL — SIGNIFICANT CHANGE UP (ref 32–37)
MCV RBC AUTO: 79.8 FL — LOW (ref 80–94)
NITRITE UR-MCNC: NEGATIVE — SIGNIFICANT CHANGE UP
NRBC # BLD: 0 /100 WBCS — SIGNIFICANT CHANGE UP (ref 0–0)
PH UR: 6 — SIGNIFICANT CHANGE UP (ref 5–8)
PLATELET # BLD AUTO: 276 K/UL — SIGNIFICANT CHANGE UP (ref 130–400)
POTASSIUM SERPL-MCNC: 4.5 MMOL/L — SIGNIFICANT CHANGE UP (ref 3.5–5)
POTASSIUM SERPL-MCNC: 5.4 MMOL/L — HIGH (ref 3.5–5)
POTASSIUM SERPL-SCNC: 4.5 MMOL/L — SIGNIFICANT CHANGE UP (ref 3.5–5)
POTASSIUM SERPL-SCNC: 5.4 MMOL/L — HIGH (ref 3.5–5)
PROT UR-MCNC: NEGATIVE MG/DL — SIGNIFICANT CHANGE UP
RBC # BLD: 4.81 M/UL — SIGNIFICANT CHANGE UP (ref 4.7–6.1)
RBC # FLD: 15.1 % — HIGH (ref 11.5–14.5)
SODIUM SERPL-SCNC: 137 MMOL/L — SIGNIFICANT CHANGE UP (ref 135–146)
SODIUM SERPL-SCNC: 137 MMOL/L — SIGNIFICANT CHANGE UP (ref 135–146)
SP GR SPEC: 1.01 — SIGNIFICANT CHANGE UP (ref 1.01–1.03)
UROBILINOGEN FLD QL: 0.2 MG/DL — SIGNIFICANT CHANGE UP (ref 0.2–0.2)
WBC # BLD: 14.93 K/UL — HIGH (ref 4.8–10.8)
WBC # FLD AUTO: 14.93 K/UL — HIGH (ref 4.8–10.8)

## 2018-10-11 RX ORDER — INSULIN LISPRO 100/ML
10 VIAL (ML) SUBCUTANEOUS ONCE
Qty: 0 | Refills: 0 | Status: COMPLETED | OUTPATIENT
Start: 2018-10-11 | End: 2018-10-11

## 2018-10-11 RX ORDER — CHLORHEXIDINE GLUCONATE 213 G/1000ML
1 SOLUTION TOPICAL
Qty: 0 | Refills: 0 | Status: DISCONTINUED | OUTPATIENT
Start: 2018-10-11 | End: 2018-10-13

## 2018-10-11 RX ADMIN — DULOXETINE HYDROCHLORIDE 30 MILLIGRAM(S): 30 CAPSULE, DELAYED RELEASE ORAL at 11:16

## 2018-10-11 RX ADMIN — TICAGRELOR 90 MILLIGRAM(S): 90 TABLET ORAL at 05:35

## 2018-10-11 RX ADMIN — Medication 40 MILLIGRAM(S): at 05:35

## 2018-10-11 RX ADMIN — ATORVASTATIN CALCIUM 80 MILLIGRAM(S): 80 TABLET, FILM COATED ORAL at 21:11

## 2018-10-11 RX ADMIN — Medication 5 UNIT(S): at 08:20

## 2018-10-11 RX ADMIN — TICAGRELOR 90 MILLIGRAM(S): 90 TABLET ORAL at 17:53

## 2018-10-11 RX ADMIN — CHLORHEXIDINE GLUCONATE 1 APPLICATION(S): 213 SOLUTION TOPICAL at 19:34

## 2018-10-11 RX ADMIN — Medication 5 UNIT(S): at 11:14

## 2018-10-11 RX ADMIN — Medication 10 UNIT(S): at 08:15

## 2018-10-11 RX ADMIN — Medication 50 MILLIGRAM(S): at 17:52

## 2018-10-11 RX ADMIN — Medication 50 MILLIGRAM(S): at 05:34

## 2018-10-11 RX ADMIN — Medication 20 MILLIGRAM(S): at 11:18

## 2018-10-11 RX ADMIN — Medication 81 MILLIGRAM(S): at 12:18

## 2018-10-11 NOTE — PROGRESS NOTE ADULT - SUBJECTIVE AND OBJECTIVE BOX
Denies any chest pain, SOB or palpitations. No orthopnea or PND.   Had one episode of vomiting last night while still in the cath lab just after given brilinta then was restarted on Intergrilin and reloaded with Brilinta.         MEDICATIONS  (STANDING):  aspirin  chewable 81 milliGRAM(s) Oral daily  atorvastatin 80 milliGRAM(s) Oral at bedtime  dextrose 5%. 1000 milliLiter(s) (50 mL/Hr) IV Continuous <Continuous>  dextrose 50% Injectable 12.5 Gram(s) IV Push once  dextrose 50% Injectable 25 Gram(s) IV Push once  dextrose 50% Injectable 25 Gram(s) IV Push once  DULoxetine 90 milliGRAM(s) Oral daily  furosemide    Tablet 40 milliGRAM(s) Oral daily  hydrALAZINE 50 milliGRAM(s) Oral every 12 hours  insulin glargine Injectable (LANTUS) 12 Unit(s) SubCutaneous at bedtime  insulin lispro Injectable (HumaLOG) 5 Unit(s) SubCutaneous three times a day before meals  metoprolol tartrate 50 milliGRAM(s) Oral two times a day  omega-3-Acid Ethyl Esters 4 Gram(s) Oral daily  PARoxetine 20 milliGRAM(s) Oral daily  sodium chloride 0.9%. 1000 milliLiter(s) (100 mL/Hr) IV Continuous <Continuous>  ticagrelor 90 milliGRAM(s) Oral two times a day    MEDICATIONS  (PRN):  dextrose 40% Gel 15 Gram(s) Oral once PRN Blood Glucose LESS THAN 70 milliGRAM(s)/deciliter  glucagon  Injectable 1 milliGRAM(s) IntraMuscular once PRN Glucose LESS THAN 70 milligrams/deciliter            Vital Signs Last 24 Hrs  T(C): 37.3 (11 Oct 2018 00:04), Max: 37.7 (10 Oct 2018 23:04)  T(F): 99.2 (11 Oct 2018 00:04), Max: 99.9 (10 Oct 2018 23:04)  HR: 84 (11 Oct 2018 05:00) (70 - 84)  BP: 117/67 (11 Oct 2018 05:00) (93/68 - 124/59)  BP(mean): 93 (11 Oct 2018 05:00) (61 - 93)  RR: 22 (11 Oct 2018 05:00) (14 - 25)  SpO2: 94% (11 Oct 2018 05:00) (91% - 99%)     REVIEW OF SYSTEMS:  CONSTITUTIONAL: No fever, weight loss, or fatigue  CARDIOLOGY: PAtient denies chest pain, shortness of breath or syncopal episodes.   RESPIRATORY: denies shortness of breath, wheezeing.   NEUROLOGICAL: NO weakness, no focal deficits to report.  GI: no BRBPR, no N,V,diarrhea.    PSYCHIATRY: normal mood and affect  HEENT: no nasal discharge, no ecchymosis  SKIN: no ecchymosis, no breakdown  MUSCULOSKELETAL: Full range of motion x4.        PHYSICAL EXAM:  · CONSTITUTIONAL:	Well-developed, well nourished    BMI-  ·RESPIRATORY:   airway patent; breath sounds equal; good air movement; respirations non-labored; clear to auscultation bilaterally; no chest wall tenderness; no intercostal retractions; no rales,rhonchi or wheeze  · CARDIOVASCULAR	regular rate and rhythm  no rub  no murmur  normal PMI  · EXTREMITIES: No cyanosis, clubbing or edema  · VASCULAR: 	Equal and normal pulses (carotid, femoral, dorsalis pedis)  	  TELEMETRY: Sinus    TTE: EF 35-40%    LABS:                        12.5   14.93 )-----------( 276      ( 11 Oct 2018 00:01 )             38.4     10-11    137  |  97<L>  |  27<H>  ----------------------------<  291<H>  5.4<H>   |  28  |  1.2    Ca    9.0      11 Oct 2018 00:01  Mg     2.2     10-09    TPro  6.9  /  Alb  4.2  /  TBili  0.4  /  DBili  x   /  AST  24  /  ALT  32  /  AlkPhos  91  10-10    CARDIAC MARKERS ( 10 Oct 2018 04:00 )  x     / 0.12 ng/mL / 348 U/L / x     / 12.7 ng/mL      PT/INR - ( 10 Oct 2018 04:00 )   PT: 12.20 sec;   INR: 1.13 ratio         PTT - ( 10 Oct 2018 04:00 )  PTT:29.2 sec    I&O's Summary    10 Oct 2018 07:01  -  11 Oct 2018 05:38  --------------------------------------------------------  IN: 200 mL / OUT: 400 mL / NET: -200 mL      BNP  RADIOLOGY & ADDITIONAL STUDIES:    IMPRESSION AND PLAN:      1- Continue current care/ASA 81 + Brilinta 90 bid  2- LDL < 70/Tight glycemic control/Patient is not on ACEI/ARB?  3- Start metoprolol (already ordered) and titrate  4- Keep NPO after mid night for possible PCI to OM on friday  5- Will F/U

## 2018-10-11 NOTE — PROGRESS NOTE ADULT - SUBJECTIVE AND OBJECTIVE BOX
FLOWER MARISCAL  63y Male    CHIEF COMPLAINT:    Patient is a 63y old  Male who presents with a chief complaint of STEMI (11 Oct 2018 05:38)      INTERVAL HPI/OVERNIGHT EVENTS:    Patient seen and examined.    ROS: All other systems are negative.    Vital Signs:    T(F): 97 (10-11-18 @ 05:00), Max: 99.9 (10-10-18 @ 23:04)  HR: 87 (10-11-18 @ 14:00) (70 - 87)  BP: 106/50 (10-11-18 @ 14:00) (84/38 - 124/59)  RR: 14 (10-11-18 @ 14:00) (10 - 25)  SpO2: 98% (10-11-18 @ 14:00) (91% - 99%)  I&O's Summary    10 Oct 2018 07:01  -  11 Oct 2018 07:00  --------------------------------------------------------  IN: 200 mL / OUT: 400 mL / NET: -200 mL      Daily Height in cm: 185.42 (10 Oct 2018 23:04)    Daily   CAPILLARY BLOOD GLUCOSE  351 (11 Oct 2018 07:00)      POCT Blood Glucose.: 324 mg/dL (11 Oct 2018 11:23)  POCT Blood Glucose.: 299 mg/dL (10 Oct 2018 22:50)  POCT Blood Glucose.: 133 mg/dL (10 Oct 2018 21:21)  POCT Blood Glucose.: 304 mg/dL (10 Oct 2018 19:27)      PHYSICAL EXAM:    GENERAL:  NAD  SKIN: No rashes or lesions  HENT: Atrumatic. Normocephalic. PERRL. Moist membranes.  NECK: Supple, No JVD. No lymphadenopathy.  PULMONARY: CTA B/L. No wheezing. No rales  CVS: Normal S1, S2. Rate and Rythm are regular. No murmurs.  ABDOMEN/GI: Soft, Nontender, Nondistended; BS present  EXTREMITIES: Peripheral pulses intact. No edema B/L LE.  NEUROLOGIC:  No motor or sensory deficit.  PSYCH: Alert & oriented x 3    Consultant(s) Notes Reviewed:  [x ] YES  [ ] NO  Care Discussed with Consultants/Other Providers [ x] YES  [ ] NO    EKG reviewed  Telemetry reviewed    LABS:                        12.5   14.93 )-----------( 276      ( 11 Oct 2018 00:01 )             38.4     10-11    137  |  97<L>  |  28<H>  ----------------------------<  325<H>  4.5   |  26  |  1.3    Ca    9.2      11 Oct 2018 13:30  Mg     2.2     10-11    TPro  6.9  /  Alb  4.2  /  TBili  0.4  /  DBili  x   /  AST  24  /  ALT  32  /  AlkPhos  91  10-10    PT/INR - ( 10 Oct 2018 04:00 )   PT: 12.20 sec;   INR: 1.13 ratio         PTT - ( 10 Oct 2018 04:00 )  PTT:29.2 sec    Trop 0.12, CKMB 12.7, , 10-10-18 @ 04:00        RADIOLOGY & ADDITIONAL TESTS:      Imaging or report Personally Reviewed:  [ ] YES  [ ] NO    Medications:  Standing  aspirin  chewable 81 milliGRAM(s) Oral daily  atorvastatin 80 milliGRAM(s) Oral at bedtime  chlorhexidine 4% Liquid 1 Application(s) Topical <User Schedule>  dextrose 5%. 1000 milliLiter(s) IV Continuous <Continuous>  dextrose 50% Injectable 12.5 Gram(s) IV Push once  dextrose 50% Injectable 25 Gram(s) IV Push once  dextrose 50% Injectable 25 Gram(s) IV Push once  DULoxetine 90 milliGRAM(s) Oral daily  furosemide    Tablet 40 milliGRAM(s) Oral daily  metoprolol tartrate 50 milliGRAM(s) Oral two times a day  omega-3-Acid Ethyl Esters 4 Gram(s) Oral daily  PARoxetine 20 milliGRAM(s) Oral daily  sodium chloride 0.9%. 1000 milliLiter(s) IV Continuous <Continuous>  ticagrelor 90 milliGRAM(s) Oral two times a day    PRN Meds  dextrose 40% Gel 15 Gram(s) Oral once PRN  glucagon  Injectable 1 milliGRAM(s) IntraMuscular once PRN      Case discussed with resident    Care discussed with pt/family FLOWER MARISCAL  63y Male    CHIEF COMPLAINT:    Patient is a 63y old  Male who presents with a chief complaint of STEMI (11 Oct 2018 05:38)      INTERVAL HPI/OVERNIGHT EVENTS:    Patient seen and examined. Complains that he was nauseous earlier today but feels better now. Vomited last night. No cp. No palpitations. No sob.    ROS: All other systems are negative.    Vital Signs:    T(F): 97 (10-11-18 @ 05:00), Max: 99.9 (10-10-18 @ 23:04)  HR: 87 (10-11-18 @ 14:00) (70 - 87)  BP: 106/50 (10-11-18 @ 14:00) (84/38 - 124/59)  RR: 14 (10-11-18 @ 14:00) (10 - 25)  SpO2: 98% (10-11-18 @ 14:00) (91% - 99%)  I&O's Summary    10 Oct 2018 07:01  -  11 Oct 2018 07:00  --------------------------------------------------------  IN: 200 mL / OUT: 400 mL / NET: -200 mL      Daily Height in cm: 185.42 (10 Oct 2018 23:04)    Daily   CAPILLARY BLOOD GLUCOSE  351 (11 Oct 2018 07:00)      POCT Blood Glucose.: 324 mg/dL (11 Oct 2018 11:23)  POCT Blood Glucose.: 299 mg/dL (10 Oct 2018 22:50)  POCT Blood Glucose.: 133 mg/dL (10 Oct 2018 21:21)  POCT Blood Glucose.: 304 mg/dL (10 Oct 2018 19:27)      PHYSICAL EXAM:    GENERAL:  NAD  SKIN: No rashes or lesions  HENT: Atrumatic. Normocephalic. PERRL. Moist membranes.  NECK: Supple, No JVD. No lymphadenopathy.  PULMONARY: CTA B/L. No wheezing. No rales  CVS: Normal S1, S2. Rate and Rythm are regular. No murmurs.  ABDOMEN/GI: Soft, Nontender, Nondistended; BS present  EXTREMITIES: Peripheral pulses intact. No edema B/L LE.  NEUROLOGIC:  No motor or sensory deficit.  PSYCH: Alert & oriented x 3    Consultant(s) Notes Reviewed:  [x ] YES  [ ] NO  Care Discussed with Consultants/Other Providers [ x] YES  [ ] NO    EKG reviewed  Telemetry reviewed    LABS:                        12.5   14.93 )-----------( 276      ( 11 Oct 2018 00:01 )             38.4     10-11    137  |  97<L>  |  28<H>  ----------------------------<  325<H>  4.5   |  26  |  1.3    Ca    9.2      11 Oct 2018 13:30  Mg     2.2     10-11    TPro  6.9  /  Alb  4.2  /  TBili  0.4  /  DBili  x   /  AST  24  /  ALT  32  /  AlkPhos  91  10-10    PT/INR - ( 10 Oct 2018 04:00 )   PT: 12.20 sec;   INR: 1.13 ratio         PTT - ( 10 Oct 2018 04:00 )  PTT:29.2 sec    Trop 0.12, CKMB 12.7, , 10-10-18 @ 04:00        RADIOLOGY & ADDITIONAL TESTS:    < from: Transthoracic Echocardiogram (10.10.18 @ 08:56) >       Summary:   1. Left ventricular ejection fraction, by visual estimation, is 35 to   40%.   2. Entire inferior wall, mid and apical anterior wall, and basal and mid   inferolateral wall are abnormal as described above.   3. Mildly increased LV wall thickness.   4. Spectral Doppler shows impaired relaxation pattern of left   ventricular myocardial filling (Grade I diastolic dysfunction).   5. Mild tricuspid regurgitation.    < end of copied text >    Imaging or report Personally Reviewed:  [ ] YES  [ ] NO    Medications:  Standing  aspirin  chewable 81 milliGRAM(s) Oral daily  atorvastatin 80 milliGRAM(s) Oral at bedtime  chlorhexidine 4% Liquid 1 Application(s) Topical <User Schedule>  dextrose 5%. 1000 milliLiter(s) IV Continuous <Continuous>  dextrose 50% Injectable 12.5 Gram(s) IV Push once  dextrose 50% Injectable 25 Gram(s) IV Push once  dextrose 50% Injectable 25 Gram(s) IV Push once  DULoxetine 90 milliGRAM(s) Oral daily  furosemide    Tablet 40 milliGRAM(s) Oral daily  metoprolol tartrate 50 milliGRAM(s) Oral two times a day  omega-3-Acid Ethyl Esters 4 Gram(s) Oral daily  PARoxetine 20 milliGRAM(s) Oral daily  sodium chloride 0.9%. 1000 milliLiter(s) IV Continuous <Continuous>  ticagrelor 90 milliGRAM(s) Oral two times a day    PRN Meds  dextrose 40% Gel 15 Gram(s) Oral once PRN  glucagon  Injectable 1 milliGRAM(s) IntraMuscular once PRN      Case discussed with resident    Care discussed with pt/family

## 2018-10-11 NOTE — PROGRESS NOTE ADULT - ASSESSMENT
A 63 years old male presented to the ER with cp, sob and diaphoresis which started around 3 AM on the day of admission. A day earlier on 10/8 pt had a VERONICA placed in RCA at [facility]. In ER EKG showed STEMI and was taken to cath lab.    1.	STEMI S/P RCA VERONICA  2.	CAD/HTN/DL  3.	DM-2  4.	Obesity  5.	COPD  6.	CKD-2          PLAN:    ·	Card F/U noted  ·	Plan for OM PCI tomorrow AM  ·	On ASA, Brilinta, Lipitor and Metoprolol  ·	Will hold on adding ACE-I until the BP is stable  ·	Monitor FS. Cont Insulin and other meds  ·	Cont in CCU A 63 years old male presented to the ER with cp, sob and diaphoresis which started around 3 AM on the day of admission. A day earlier on 10/8 pt had a VERONICA placed in RCA at [facility]. In ER EKG showed STEMI and was taken to cath lab.    1.	STEMI S/P RCA VERONICA  2.	CAD/HTN/DL  3.	DM-2  4.	Obesity  5.	COPD  6.	CKD-2          PLAN:    ·	Card F/U noted  ·	Plan for OM PCI tomorrow AM  ·	On ASA, Brilinta, Lipitor and Metoprolol  ·	Will hold on adding ACE-I until the BP is stable  ·	Monitor FS. Cont Insulin and other meds  ·	Cont in CCU  ·	Plan of care D/W the pt and his wife.

## 2018-10-11 NOTE — PROGRESS NOTE ADULT - ASSESSMENT
FLOWER MARISCAL 63y Male  MRN#: [id number]   CODE STATUS:       SUBJECTIVE    Patient is a 63y old Male who presents with a chief complaint of STEMI (11 Oct 2018 05:38)  Currently admitted to medicine with the primary diagnosis of STEMI (ST elevation myocardial infarction)  Hospital course has been complicated by .     Interval History: Today is hospital day 1d. Overnight . Today .    Present Today:           Loja Catheter ()No/ ()Yes? Indication:          Central Line ()No/ ()Yes? Indication:          IV Fluids ()No/ ()Yes? Type:  Rate:  Indication:    HPI:   HPI:  63 year old Male with pertinent medical history of Hypertension and CAD presented to the hospital for chest pain/ SOB and diaphoresis since the morning of admission. He recenty had cardiac cath with PCI to RCA on 10/8 and was discharged home less than 24 hours ago. He has also had a history of PCI to D1 in  which had thrombosed 3 days after stent placement. He had a MICS- CABG one month back. He was discharged on aspirin and plavix after the recent catheterization. 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  Type 2 diabetes mellitus: DM (diabetes mellitus), type 2  History of surgery to heart and great vessels, presenting hazards to health: x 4 in   Atherosclerosis of coronary artery: CAD (coronary artery disease)  Status post percutaneous transluminal coronary angioplasty: in   Stented coronary artery  History of surgery to major organs, presenting hazards to health: heart stents  Other postprocedural status: Fixation hardware in foot      ALLERGIES:  ACE inhibitors (Hives)  enalapril (Hives)      MEDICATIONS:  STANDING MEDICATIONS  aspirin  chewable 81 milliGRAM(s) Oral daily  atorvastatin 80 milliGRAM(s) Oral at bedtime  chlorhexidine 4% Liquid 1 Application(s) Topical <User Schedule>  dextrose 5%. 1000 milliLiter(s) IV Continuous <Continuous>  dextrose 50% Injectable 12.5 Gram(s) IV Push once  dextrose 50% Injectable 25 Gram(s) IV Push once  dextrose 50% Injectable 25 Gram(s) IV Push once  DULoxetine 90 milliGRAM(s) Oral daily  furosemide    Tablet 40 milliGRAM(s) Oral daily  metoprolol tartrate 50 milliGRAM(s) Oral two times a day  omega-3-Acid Ethyl Esters 4 Gram(s) Oral daily  PARoxetine 20 milliGRAM(s) Oral daily  sodium chloride 0.9%. 1000 milliLiter(s) IV Continuous <Continuous>  ticagrelor 90 milliGRAM(s) Oral two times a day    PRN MEDICATIONS  dextrose 40% Gel 15 Gram(s) Oral once PRN  glucagon  Injectable 1 milliGRAM(s) IntraMuscular once PRN          OBJECTIVE    VITAL SIGNS: Last 24 Hours  T(C): 36.1 (11 Oct 2018 05:00), Max: 37.7 (10 Oct 2018 23:04)  T(F): 97 (11 Oct 2018 05:00), Max: 99.9 (10 Oct 2018 23:04)  HR: 87 (11 Oct 2018 14:00) (70 - 87)  BP: 106/50 (11 Oct 2018 14:00) (84/38 - 124/59)  BP(mean): 62 (11 Oct 2018 14:00) (61 - 93)  RR: 14 (11 Oct 2018 14:00) (10 - 25)  SpO2: 98% (11 Oct 2018 14:00) (91% - 99%)      PHYSICAL EXAM:    GENERAL: NAD, well-developed, AAOx3  HEENT:  Atraumatic, Normocephalic. EOMI, PERRLA, conjunctiva and sclera clear, No JVD  PULMONARY: Clear to auscultation bilaterally; No wheeze  CARDIOVASCULAR: Regular rate and rhythm; No murmurs, rubs, or gallops  GASTROINTESTINAL: Soft, Nontender, Nondistended; Bowel sounds present  MUSCULOSKELETAL:  2+ Peripheral Pulses, No clubbing, cyanosis, or edema  NEUROLOGY: non-focal  SKIN: No rashes or lesions      LABS:                        12.5   14.93 )-----------( 276      ( 11 Oct 2018 00:01 )             38.4     10-11    137  |  97<L>  |  28<H>  ----------------------------<  325<H>  4.5   |  26  |  1.3    Ca    9.2      11 Oct 2018 13:30  Mg     2.2     10-11    TPro  6.9  /  Alb  4.2  /  TBili  0.4  /  DBili  x   /  AST  24  /  ALT  32  /  AlkPhos  91  10-10    PT/INR - ( 10 Oct 2018 04:00 )   PT: 12.20 sec;   INR: 1.13 ratio         PTT - ( 10 Oct 2018 04:00 )  PTT:29.2 sec  Urinalysis Basic - ( 11 Oct 2018 11:00 )    Color: Yellow / Appearance: Clear / S.015 / pH: x  Gluc: x / Ketone: Negative  / Bili: Negative / Urobili: 0.2 mg/dL   Blood: x / Protein: Negative mg/dL / Nitrite: Negative   Leuk Esterase: Negative / RBC: x / WBC x   Sq Epi: x / Non Sq Epi: x / Bacteria: x            CARDIAC MARKERS ( 10 Oct 2018 04:00 )  x     / 0.12 ng/mL / 348 U/L / x     / 12.7 ng/mL          RADIOLOGY:        ASSESSMENT AND PLAN    Patient seen and examined independently. Resident's H & P reviewed. Agree with the findings and plan of care except,   A 63 years old male presented to the ER with cp, sob and diaphoresis which started around 3 AM. A day earlier pt had a VERONICA placed in RCA at [facility]. Pt had STEMI and was taken to cath lab.  EKG: NSR @ 86/min. LAD. ST elevation in leads II, III, and avf.    ASSESSMENT:    1. STEMI  2. CAD  3. DM-2  4. Obesity  5. CKD-2  6. HTN  - discontinue hydralazine  - monitor BP  - Start ACEI or ARB if BP will tolerate    /DL  7. COPD    PLAN:    . S/P PCI of RCA.  . CCU  . Cont ASA, Brilinta, and Lipitor  . Cont his other meds  . Monitor FS. Cont Insulin  . Plan for staged PCI .

## 2018-10-12 ENCOUNTER — TRANSCRIPTION ENCOUNTER (OUTPATIENT)
Age: 63
End: 2018-10-12

## 2018-10-12 DIAGNOSIS — J44.9 CHRONIC OBSTRUCTIVE PULMONARY DISEASE, UNSPECIFIED: ICD-10-CM

## 2018-10-12 DIAGNOSIS — N18.2 CHRONIC KIDNEY DISEASE, STAGE 2 (MILD): ICD-10-CM

## 2018-10-12 DIAGNOSIS — E11.22 TYPE 2 DIABETES MELLITUS WITH DIABETIC CHRONIC KIDNEY DISEASE: ICD-10-CM

## 2018-10-12 DIAGNOSIS — I25.110 ATHEROSCLEROTIC HEART DISEASE OF NATIVE CORONARY ARTERY WITH UNSTABLE ANGINA PECTORIS: ICD-10-CM

## 2018-10-12 DIAGNOSIS — E11.40 TYPE 2 DIABETES MELLITUS WITH DIABETIC NEUROPATHY, UNSPECIFIED: ICD-10-CM

## 2018-10-12 DIAGNOSIS — F32.9 MAJOR DEPRESSIVE DISORDER, SINGLE EPISODE, UNSPECIFIED: ICD-10-CM

## 2018-10-12 DIAGNOSIS — Z95.1 PRESENCE OF AORTOCORONARY BYPASS GRAFT: ICD-10-CM

## 2018-10-12 DIAGNOSIS — I13.0 HYPERTENSIVE HEART AND CHRONIC KIDNEY DISEASE WITH HEART FAILURE AND STAGE 1 THROUGH STAGE 4 CHRONIC KIDNEY DISEASE, OR UNSPECIFIED CHRONIC KIDNEY DISEASE: ICD-10-CM

## 2018-10-12 DIAGNOSIS — Z79.4 LONG TERM (CURRENT) USE OF INSULIN: ICD-10-CM

## 2018-10-12 DIAGNOSIS — E78.5 HYPERLIPIDEMIA, UNSPECIFIED: ICD-10-CM

## 2018-10-12 DIAGNOSIS — M17.11 UNILATERAL PRIMARY OSTEOARTHRITIS, RIGHT KNEE: ICD-10-CM

## 2018-10-12 DIAGNOSIS — I50.22 CHRONIC SYSTOLIC (CONGESTIVE) HEART FAILURE: ICD-10-CM

## 2018-10-12 DIAGNOSIS — Z96.41 PRESENCE OF INSULIN PUMP (EXTERNAL) (INTERNAL): ICD-10-CM

## 2018-10-12 DIAGNOSIS — I25.10 ATHEROSCLEROTIC HEART DISEASE OF NATIVE CORONARY ARTERY WITHOUT ANGINA PECTORIS: ICD-10-CM

## 2018-10-12 LAB
ALBUMIN SERPL ELPH-MCNC: 3.7 G/DL — SIGNIFICANT CHANGE UP (ref 3.5–5.2)
ALP SERPL-CCNC: 84 U/L — SIGNIFICANT CHANGE UP (ref 30–115)
ALT FLD-CCNC: 40 U/L — SIGNIFICANT CHANGE UP (ref 0–41)
ANION GAP SERPL CALC-SCNC: 14 MMOL/L — SIGNIFICANT CHANGE UP (ref 7–14)
AST SERPL-CCNC: 50 U/L — HIGH (ref 0–41)
BILIRUB SERPL-MCNC: 1.9 MG/DL — HIGH (ref 0.2–1.2)
BUN SERPL-MCNC: 26 MG/DL — HIGH (ref 10–20)
CALCIUM SERPL-MCNC: 8.9 MG/DL — SIGNIFICANT CHANGE UP (ref 8.5–10.1)
CHLORIDE SERPL-SCNC: 98 MMOL/L — SIGNIFICANT CHANGE UP (ref 98–110)
CO2 SERPL-SCNC: 25 MMOL/L — SIGNIFICANT CHANGE UP (ref 17–32)
CREAT SERPL-MCNC: 1.1 MG/DL — SIGNIFICANT CHANGE UP (ref 0.7–1.5)
GLUCOSE BLDC GLUCOMTR-MCNC: 255 MG/DL — HIGH (ref 70–99)
GLUCOSE BLDC GLUCOMTR-MCNC: 258 MG/DL — HIGH (ref 70–99)
GLUCOSE SERPL-MCNC: 231 MG/DL — HIGH (ref 70–99)
HCT VFR BLD CALC: 37 % — LOW (ref 42–52)
HGB BLD-MCNC: 11.9 G/DL — LOW (ref 14–18)
MAGNESIUM SERPL-MCNC: 1.9 MG/DL — SIGNIFICANT CHANGE UP (ref 1.8–2.4)
MCHC RBC-ENTMCNC: 25.6 PG — LOW (ref 27–31)
MCHC RBC-ENTMCNC: 32.2 G/DL — SIGNIFICANT CHANGE UP (ref 32–37)
MCV RBC AUTO: 79.6 FL — LOW (ref 80–94)
NRBC # BLD: 0 /100 WBCS — SIGNIFICANT CHANGE UP (ref 0–0)
PLATELET # BLD AUTO: 248 K/UL — SIGNIFICANT CHANGE UP (ref 130–400)
POTASSIUM SERPL-MCNC: 4.9 MMOL/L — SIGNIFICANT CHANGE UP (ref 3.5–5)
POTASSIUM SERPL-SCNC: 4.9 MMOL/L — SIGNIFICANT CHANGE UP (ref 3.5–5)
PROT SERPL-MCNC: 6.6 G/DL — SIGNIFICANT CHANGE UP (ref 6–8)
RBC # BLD: 4.65 M/UL — LOW (ref 4.7–6.1)
RBC # FLD: 14.8 % — HIGH (ref 11.5–14.5)
SODIUM SERPL-SCNC: 137 MMOL/L — SIGNIFICANT CHANGE UP (ref 135–146)
WBC # BLD: 15 K/UL — HIGH (ref 4.8–10.8)
WBC # FLD AUTO: 15 K/UL — HIGH (ref 4.8–10.8)

## 2018-10-12 RX ORDER — TICAGRELOR 90 MG/1
1 TABLET ORAL
Qty: 60 | Refills: 0 | OUTPATIENT
Start: 2018-10-12 | End: 2018-11-10

## 2018-10-12 RX ORDER — HYDRALAZINE HCL 50 MG
0 TABLET ORAL
Qty: 0 | Refills: 0 | COMMUNITY

## 2018-10-12 RX ORDER — METOPROLOL TARTRATE 50 MG
25 TABLET ORAL
Qty: 0 | Refills: 0 | Status: DISCONTINUED | OUTPATIENT
Start: 2018-10-12 | End: 2018-10-13

## 2018-10-12 RX ORDER — METOPROLOL TARTRATE 50 MG
1 TABLET ORAL
Qty: 60 | Refills: 0 | OUTPATIENT
Start: 2018-10-12 | End: 2018-11-10

## 2018-10-12 RX ORDER — ONDANSETRON 8 MG/1
4 TABLET, FILM COATED ORAL ONCE
Qty: 0 | Refills: 0 | Status: COMPLETED | OUTPATIENT
Start: 2018-10-12 | End: 2018-10-12

## 2018-10-12 RX ORDER — METOPROLOL TARTRATE 50 MG
1 TABLET ORAL
Qty: 0 | Refills: 0 | COMMUNITY

## 2018-10-12 RX ORDER — ROSUVASTATIN CALCIUM 5 MG/1
1 TABLET ORAL
Qty: 30 | Refills: 0 | OUTPATIENT
Start: 2018-10-12 | End: 2018-11-10

## 2018-10-12 RX ORDER — SODIUM CHLORIDE 9 MG/ML
500 INJECTION INTRAMUSCULAR; INTRAVENOUS; SUBCUTANEOUS
Qty: 0 | Refills: 0 | Status: DISCONTINUED | OUTPATIENT
Start: 2018-10-12 | End: 2018-10-13

## 2018-10-12 RX ADMIN — TICAGRELOR 90 MILLIGRAM(S): 90 TABLET ORAL at 17:48

## 2018-10-12 RX ADMIN — Medication 25 MILLIGRAM(S): at 17:48

## 2018-10-12 RX ADMIN — ONDANSETRON 4 MILLIGRAM(S): 8 TABLET, FILM COATED ORAL at 02:29

## 2018-10-12 RX ADMIN — ATORVASTATIN CALCIUM 80 MILLIGRAM(S): 80 TABLET, FILM COATED ORAL at 21:08

## 2018-10-12 RX ADMIN — TICAGRELOR 90 MILLIGRAM(S): 90 TABLET ORAL at 05:59

## 2018-10-12 RX ADMIN — Medication 50 MILLIGRAM(S): at 05:59

## 2018-10-12 RX ADMIN — CHLORHEXIDINE GLUCONATE 1 APPLICATION(S): 213 SOLUTION TOPICAL at 05:59

## 2018-10-12 RX ADMIN — Medication 40 MILLIGRAM(S): at 05:58

## 2018-10-12 NOTE — DISCHARGE NOTE ADULT - CARE PLAN
Principal Discharge DX:	STEMI (ST elevation myocardial infarction)  Goal:	Prevent heart disease  Assessment and plan of treatment:	Take all medications as prescribed, including your aspirin and ticagrelor (do not use plavix in the future). Talk to your primary care doctor about starting an ACE inhibitor or ARB medication that you can tolerate if your blood pressure is appropriate for this. Principal Discharge DX:	STEMI (ST elevation myocardial infarction)  Goal:	Prevent heart disease  Assessment and plan of treatment:	Take all medications as prescribed, including your aspirin and ticagrelor (do not use plavix in the future). Talk to your primary care doctor about starting an ACE inhibitor or ARB medication that you can tolerate if your blood pressure is appropriate for this. Follow up with your cardiologist in 1 week.

## 2018-10-12 NOTE — DISCHARGE NOTE ADULT - MEDICATION SUMMARY - MEDICATIONS TO STOP TAKING
I will STOP taking the medications listed below when I get home from the hospital:    hydrALAZINE 100 mg oral tablet  -- orally once a day    clopidogrel 75 mg oral tablet  -- 1 tab(s) by mouth once a day

## 2018-10-12 NOTE — PROGRESS NOTE ADULT - SUBJECTIVE AND OBJECTIVE BOX
POST OPERATIVE PROCEDURAL DOCUMENTATION  PRE-OP DIAGNOSIS: satged PCI to OM1    POST-OP DIAGNOSIS:satged PCI to OM1    PROCEDURE:   LEFT HEART CATHERIZATION    Physician: Jessica YANEZ  Assistant: Shobha YANEZ    ANESTHESIA TYPE:  [x ] Sedation  [x ] Local/Regional  [  ]General Anesthesia    ESTIMATED BLOOD LOSS: <10 ml          CONDITION  [x ] Good  [   ] Fair  [   ] Serious  [   ] Critical      SPECIMENS REMOVED (IF APPLICABLE):  none      IMPLANTS (IF APPLICABLE): VERONICA      FINDINGS:       Left Circumflex: Distal LCX: moderate disease   OM: 95% discrete ostial lesion       PERCUTANEOUS CORONARY INTERVENTIONS: PCI with VERONICA to OM1      COMPLICATIONS: none      POST-OP DIAGNOSIS: CAD    [ ] Normal Coronary Arteries  [ ] Luminal Irregularities  [ ] Non-obstructive CAD        PLAN OF CARE      [ ] D/C Home today   [ ]  D/C in AM  [ x] Return to In-patient bed  [ ] Admit for observation  [ ] Return for staged procedure:  [ ] CT Surgery consult called  [x ]  Continue DAPT, B-blocker & Statin therapy  [x ]  Medical Therapy  [x ] Aggressive risk factor modification. The patient should follow a low fat and low calorie diet.

## 2018-10-12 NOTE — PROGRESS NOTE ADULT - SUBJECTIVE AND OBJECTIVE BOX
Next Visit Date:  No future appointments.     Health Maintenance   Topic Date Due    Hepatitis C screen  1958    HIV screen  08/20/1975    Breast cancer screen  02/22/2018    Cervical cancer screen  06/12/2018    Lipid screen  09/25/2022    DTaP/Tdap/Td vaccine (2 - Td) 10/15/2025    Colon cancer screen colonoscopy  08/02/2026    Flu vaccine  Completed       Hemoglobin A1C (%)   Date Value   11/28/2016 5.2   02/22/2016 5.2   04/07/2015 5.5             ( goal A1C is < 7)   No results found for: LABMICR  LDL Cholesterol (mg/dL)   Date Value   09/25/2017 91       (goal LDL is <100)   AST (U/L)   Date Value   09/25/2017 16     ALT (U/L)   Date Value   09/25/2017 16     BUN (mg/dL)   Date Value   09/25/2017 10     BP Readings from Last 3 Encounters:   11/07/17 128/82   10/04/17 128/80   03/28/17 130/80          (goal 120/80)    All Future Testing planned in [facility]  Lab Frequency Next Occurrence   Hepatitis C Antibody Once 12/07/2017   HIV Screen Once 12/07/2017   TSH Once 11/07/2018   T4, Free Once 12/07/2017               Patient Active Problem List:     Vitamin B 12 deficiency     Hypertension     Abdominal pain     Elbow pain     Hip pain     Fatigue     Hypothyroidism     Vitamin D insufficiency     Neck pain     Hyperglycemia     Depression     Anxiety     Abdominal pain, right lower quadrant FLOWER MARISCAL  63y Male    CHIEF COMPLAINT:    Patient is a 63y old  Male who presents with a chief complaint of Post cath (12 Oct 2018 13:28)      INTERVAL HPI/OVERNIGHT EVENTS:    Patient seen and examined.    ROS: All other systems are negative.    Vital Signs:    T(F): 97.1 (10-12-18 @ 04:00), Max: 97.8 (10-11-18 @ 20:00)  HR: 88 (10-12-18 @ 08:00) (70 - 102)  BP: 102/63 (10-12-18 @ 08:00) (101/57 - 122/75)  RR: 18 (10-12-18 @ 08:00) (18 - 36)  SpO2: 97% (10-12-18 @ 00:00) (97% - 97%)  I&O's Summary    Daily     Daily   CAPILLARY BLOOD GLUCOSE  255 (12 Oct 2018 06:00)      POCT Blood Glucose.: 258 mg/dL (12 Oct 2018 08:11)  POCT Blood Glucose.: 255 mg/dL (12 Oct 2018 06:08)  POCT Blood Glucose.: 310 mg/dL (11 Oct 2018 21:57)  POCT Blood Glucose.: 291 mg/dL (11 Oct 2018 16:50)      PHYSICAL EXAM:    GENERAL:  NAD  SKIN: No rashes or lesions  HENT: Atrumatic. Normocephalic. PERRL. Moist membranes.  NECK: Supple, No JVD. No lymphadenopathy.  PULMONARY: CTA B/L. No wheezing. No rales  CVS: Normal S1, S2. Rate and Rythm are regular. No murmurs.  ABDOMEN/GI: Soft, Nontender, Nondistended; BS present  EXTREMITIES: Peripheral pulses intact. No edema B/L LE.  NEUROLOGIC:  No motor or sensory deficit.  PSYCH: Alert & oriented x 3    Consultant(s) Notes Reviewed:  [x ] YES  [ ] NO  Care Discussed with Consultants/Other Providers [ x] YES  [ ] NO    EKG reviewed  Telemetry reviewed    LABS:                        11.9   15.00 )-----------( 248      ( 12 Oct 2018 06:07 )             37.0     10-12    137  |  98  |  26<H>  ----------------------------<  231<H>  4.9   |  25  |  1.1    Ca    8.9      12 Oct 2018 06:07  Mg     1.9     10-12    TPro  6.6  /  Alb  3.7  /  TBili  1.9<H>  /  DBili  x   /  AST  50<H>  /  ALT  40  /  AlkPhos  84  10-12        Trop 0.12, CKMB 12.7, , 10-10-18 @ 04:00        RADIOLOGY & ADDITIONAL TESTS:      Imaging or report Personally Reviewed:  [ ] YES  [ ] NO    Medications:  Standing  aspirin  chewable 81 milliGRAM(s) Oral daily  atorvastatin 80 milliGRAM(s) Oral at bedtime  chlorhexidine 4% Liquid 1 Application(s) Topical <User Schedule>  dextrose 5%. 1000 milliLiter(s) IV Continuous <Continuous>  dextrose 50% Injectable 12.5 Gram(s) IV Push once  dextrose 50% Injectable 25 Gram(s) IV Push once  dextrose 50% Injectable 25 Gram(s) IV Push once  DULoxetine 90 milliGRAM(s) Oral daily  furosemide    Tablet 40 milliGRAM(s) Oral daily  metoprolol tartrate 25 milliGRAM(s) Oral two times a day  omega-3-Acid Ethyl Esters 4 Gram(s) Oral daily  PARoxetine 20 milliGRAM(s) Oral daily  sodium chloride 0.9%. 500 milliLiter(s) IV Continuous <Continuous>  ticagrelor 90 milliGRAM(s) Oral two times a day    PRN Meds  dextrose 40% Gel 15 Gram(s) Oral once PRN  glucagon  Injectable 1 milliGRAM(s) IntraMuscular once PRN      Case discussed with resident    Care discussed with pt/family FLOWER MARISCAL  63y Male    CHIEF COMPLAINT:    Patient is a 63y old  Male who presents with a chief complaint of Post cath (12 Oct 2018 13:28)      INTERVAL HPI/OVERNIGHT EVENTS:    Patient seen and examined. S/P OM1 VERONICA. No cp. No sob. No palpitations.    ROS: All other systems are negative.    Vital Signs:    T(F): 97.1 (10-12-18 @ 04:00), Max: 97.8 (10-11-18 @ 20:00)  HR: 88 (10-12-18 @ 08:00) (70 - 102)  BP: 102/63 (10-12-18 @ 08:00) (101/57 - 122/75)  RR: 18 (10-12-18 @ 08:00) (18 - 36)  SpO2: 97% (10-12-18 @ 00:00) (97% - 97%)  I&O's Summary    Daily     Daily   CAPILLARY BLOOD GLUCOSE  255 (12 Oct 2018 06:00)      POCT Blood Glucose.: 258 mg/dL (12 Oct 2018 08:11)  POCT Blood Glucose.: 255 mg/dL (12 Oct 2018 06:08)  POCT Blood Glucose.: 310 mg/dL (11 Oct 2018 21:57)  POCT Blood Glucose.: 291 mg/dL (11 Oct 2018 16:50)      PHYSICAL EXAM:    GENERAL:  NAD  SKIN: No rashes or lesions  HENT: Atrumatic. Normocephalic. PERRL. Moist membranes.  NECK: Supple, No JVD. No lymphadenopathy.  PULMONARY: CTA B/L. No wheezing. No rales  CVS: Normal S1, S2. Rate and Rythm are regular. No murmurs.  ABDOMEN/GI: Soft, Nontender, Nondistended; BS present  EXTREMITIES: Peripheral pulses intact. No edema B/L LE.  NEUROLOGIC:  No motor or sensory deficit.  PSYCH: Alert & oriented x 3    Consultant(s) Notes Reviewed:  [x ] YES  [ ] NO  Care Discussed with Consultants/Other Providers [ x] YES  [ ] NO    EKG reviewed  Telemetry reviewed    LABS:                        11.9   15.00 )-----------( 248      ( 12 Oct 2018 06:07 )             37.0     10-12    137  |  98  |  26<H>  ----------------------------<  231<H>  4.9   |  25  |  1.1    Ca    8.9      12 Oct 2018 06:07  Mg     1.9     10-12    TPro  6.6  /  Alb  3.7  /  TBili  1.9<H>  /  DBili  x   /  AST  50<H>  /  ALT  40  /  AlkPhos  84  10-12        Trop 0.12, CKMB 12.7, , 10-10-18 @ 04:00        RADIOLOGY & ADDITIONAL TESTS:      Imaging or report Personally Reviewed:  [ ] YES  [ ] NO    Medications:  Standing  aspirin  chewable 81 milliGRAM(s) Oral daily  atorvastatin 80 milliGRAM(s) Oral at bedtime  chlorhexidine 4% Liquid 1 Application(s) Topical <User Schedule>  dextrose 5%. 1000 milliLiter(s) IV Continuous <Continuous>  dextrose 50% Injectable 12.5 Gram(s) IV Push once  dextrose 50% Injectable 25 Gram(s) IV Push once  dextrose 50% Injectable 25 Gram(s) IV Push once  DULoxetine 90 milliGRAM(s) Oral daily  furosemide    Tablet 40 milliGRAM(s) Oral daily  metoprolol tartrate 25 milliGRAM(s) Oral two times a day  omega-3-Acid Ethyl Esters 4 Gram(s) Oral daily  PARoxetine 20 milliGRAM(s) Oral daily  sodium chloride 0.9%. 500 milliLiter(s) IV Continuous <Continuous>  ticagrelor 90 milliGRAM(s) Oral two times a day    PRN Meds  dextrose 40% Gel 15 Gram(s) Oral once PRN  glucagon  Injectable 1 milliGRAM(s) IntraMuscular once PRN      Case discussed with resident    Care discussed with pt/family

## 2018-10-12 NOTE — DISCHARGE NOTE ADULT - MEDICATION SUMMARY - MEDICATIONS TO CHANGE
I will SWITCH the dose or number of times a day I take the medications listed below when I get home from the hospital:    Metoprolol Tartrate 37.5 mg oral tablet  -- 1 tab(s) by mouth 2 times a day    Crestor 20 mg oral tablet  -- 1 tab(s) by mouth once a day (at bedtime)   -- Do not take dairy products, antacids, or iron preparations within one hour of this medication.  Do not take this drug if you are pregnant.  It is very important that you take or use this exactly as directed.  Do not skip doses or discontinue unless directed by your doctor.

## 2018-10-12 NOTE — DISCHARGE NOTE ADULT - HOSPITAL COURSE
63 year old Male with pertinent medical history of Hypertension and CAD presented to the hospital for chest pain/ SOB and diaphoresis since the morning of admission. He recenty had cardiac cath with PCI to RCA on 10/8 and was discharged home less than 24 hours ago. He has also had a history of PCI to D1 in 2006 which had thrombosed 3 days after stent placement. He had a MICS- CABG one month back. He was discharged on aspirin and plavix after the recent catheterization. (10 Oct 2018 10:30) 63 year old Male with pertinent medical history of Hypertension and CAD presented to the hospital for chest pain/ SOB and diaphoresis since the morning of admission. He recenty had cardiac cath with PCI to RCA on 10/8 and was discharged home less than 24 hours ago. He has also had a history of PCI to D1 in 2006 which had thrombosed 3 days after stent placement. He had a MICS- CABG one month back. He was discharged on aspirin and plavix after the recent catheterization. Repeat cath showing thrombosis of stent; presumed failure of Plavix. Was switched to Brilinta. To follow up with PCP and cardiology outpatient.

## 2018-10-12 NOTE — PROGRESS NOTE ADULT - ASSESSMENT
A 63 years old male presented to the ER with cp, sob and diaphoresis which started around 3 AM on the day of admission. A day earlier on 10/8 pt had a VERONICA placed in RCA at [facility]. In ER EKG showed STEMI and was taken to cath lab.    1.	STEMI S/P RCA and OM1 VERONICA  2.	CAD/HTN/DL  3.	DM-2  4.	Obesity  5.	COPD  6.	CKD-2          PLAN:    ·	Card F/U noted. S/P OM1 VERONICA today. Had RCA stent 2 days ago  ·	On ASA, Brilinta, Lipitor and Metoprolol  ·	Will hold on adding ACE-I until the BP is stable  ·	Monitor FS. Cont Insulin and other meds  ·	Cont in CCU  ·	Plan of care D/W the pt and his wife. A 63 years old male presented to the ER with cp, sob and diaphoresis which started around 3 AM on the day of admission. A day earlier on 10/8 pt had a VERONICA placed in RCA at [facility]. In ER EKG showed STEMI and was taken to cath lab.    1.	STEMI S/P RCA and OM1 VERONICA  2.	CAD/HTN/DL  3.	DM-2  4.	Obesity  5.	COPD  6.	CKD-2          PLAN:    ·	Card F/U noted. S/P OM1 VERONICA today. Had RCA stent 2 days ago  ·	On ASA, Brilinta, Lipitor and Metoprolol  ·	Will hold on adding ARB until the BP is stable. Pt states that he is allergic to ACE-I  ·	Monitor FS. Cont Insulin and other meds  ·	Cont in CCU  ·	Plan of care D/W the pt. Possible D/C in AM

## 2018-10-12 NOTE — DISCHARGE NOTE ADULT - CARE PROVIDER_API CALL
Lilia Caldera), Cardiovascular Disease; Internal Medicine  [address]  [address] [place] [address]  Phone: [phone]  Fax: [phone]    Mihir Lopez), Cardiovascular Disease; Internal Medicine; Interventional Cardiology  [address] [place] [address]  Phone: [phone]  Fax: [phone]

## 2018-10-12 NOTE — DISCHARGE NOTE ADULT - CARE PROVIDERS DIRECT ADDRESSES
[email or web address]

## 2018-10-12 NOTE — DISCHARGE NOTE ADULT - PLAN OF CARE
Prevent heart disease Take all medications as prescribed, including your aspirin and ticagrelor (do not use plavix in the future). Talk to your primary care doctor about starting an ACE inhibitor or ARB medication that you can tolerate if your blood pressure is appropriate for this. Take all medications as prescribed, including your aspirin and ticagrelor (do not use plavix in the future). Talk to your primary care doctor about starting an ACE inhibitor or ARB medication that you can tolerate if your blood pressure is appropriate for this. Follow up with your cardiologist in 1 week.

## 2018-10-12 NOTE — PROGRESS NOTE ADULT - ASSESSMENT
FLOWER MARISCAL 63y Male  MRN#: [id number]   CODE STATUS: Full code      SUBJECTIVE    Patient is a 63y old male who presents with a chief complaint of post-cath chest pain (12 Oct 2018 13:28).  Currently admitted to medicine with the primary diagnosis of STEMI (ST elevation myocardial infarction).    Interval History: Today is hospital day 2d. Overnight there were no events. Today he is feeling well, no new complaints.    HPI:   HPI:  63 year old Male with pertinent medical history of Hypertension and CAD presented to the hospital for chest pain/ SOB and diaphoresis since the morning of admission. He recenty had cardiac cath with PCI to RCA on 10/8 and was discharged home less than 24 hours ago. He has also had a history of PCI to D1 in  which had thrombosed 3 days after stent placement. He had a MICS- CABG one month back. He was discharged on aspirin and plavix after the recent catheterization. 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  Type 2 diabetes mellitus: DM (diabetes mellitus), type 2  History of surgery to heart and great vessels, presenting hazards to health: x 4 in   Atherosclerosis of coronary artery: CAD (coronary artery disease)  Status post percutaneous transluminal coronary angioplasty: in   Stented coronary artery  History of surgery to major organs, presenting hazards to health: heart stents  Other postprocedural status: Fixation hardware in foot      ALLERGIES:  ACE inhibitors (Hives)  enalapril (Hives)      MEDICATIONS:  STANDING MEDICATIONS  aspirin  chewable 81 milliGRAM(s) Oral daily  atorvastatin 80 milliGRAM(s) Oral at bedtime  chlorhexidine 4% Liquid 1 Application(s) Topical <User Schedule>  dextrose 5%. 1000 milliLiter(s) IV Continuous <Continuous>  dextrose 50% Injectable 12.5 Gram(s) IV Push once  dextrose 50% Injectable 25 Gram(s) IV Push once  dextrose 50% Injectable 25 Gram(s) IV Push once  DULoxetine 90 milliGRAM(s) Oral daily  furosemide    Tablet 40 milliGRAM(s) Oral daily  metoprolol tartrate 25 milliGRAM(s) Oral two times a day  omega-3-Acid Ethyl Esters 4 Gram(s) Oral daily  PARoxetine 20 milliGRAM(s) Oral daily  sodium chloride 0.9%. 500 milliLiter(s) IV Continuous <Continuous>  ticagrelor 90 milliGRAM(s) Oral two times a day    PRN MEDICATIONS  dextrose 40% Gel 15 Gram(s) Oral once PRN  glucagon  Injectable 1 milliGRAM(s) IntraMuscular once PRN        OBJECTIVE    VITAL SIGNS: Last 24 Hours  T(C): 36.2 (12 Oct 2018 04:00), Max: 36.6 (11 Oct 2018 20:00)  T(F): 97.1 (12 Oct 2018 04:00), Max: 97.8 (11 Oct 2018 20:00)  HR: 88 (12 Oct 2018 08:00) (70 - 102)  BP: 102/63 (12 Oct 2018 08:00) (101/57 - 122/75)  BP(mean): 85 (12 Oct 2018 08:00) (62 - 89)  RR: 18 (12 Oct 2018 08:00) (14 - 36)  SpO2: 97% (12 Oct 2018 00:00) (97% - 98%)      PHYSICAL EXAM:    GENERAL: NAD, well-developed, AAOx3  HEENT:  Atraumatic, Normocephalic. EOMI, PERRLA, conjunctiva and sclera clear.  PULMONARY: Clear to auscultation bilaterally.  CARDIOVASCULAR: Regular rate and rhythm; No murmurs, rubs, or gallops.  MUSCULOSKELETAL:  Cath femoral access site with sutures, appropriately closed and without drainage.      LABS:                        11.9   15.00 )-----------( 248      ( 12 Oct 2018 06:07 )             37.0     10    137  |  98  |  26<H>  ----------------------------<  231<H>  4.9   |  25  |  1.1    Ca    8.9      12 Oct 2018 06:07  Mg     1.9     10-12    TPro  6.6  /  Alb  3.7  /  TBili  1.9<H>  /  DBili  x   /  AST  50<H>  /  ALT  40  /  AlkPhos  84  10-12      Urinalysis Basic - ( 11 Oct 2018 11:00 )    Color: Yellow / Appearance: Clear / S.015 / pH: x  Gluc: x / Ketone: Negative  / Bili: Negative / Urobili: 0.2 mg/dL   Blood: x / Protein: Negative mg/dL / Nitrite: Negative   Leuk Esterase: Negative / RBC: x / WBC x   Sq Epi: x / Non Sq Epi: x / Bacteria: x    RADIOLOGY:        ASSESSMENT AND PLAN    Patient is a 63y old male who presents with a chief complaint of post-cath chest pain (12 Oct 2018 13:28).  Currently admitted to medicine with the primary diagnosis of STEMI (ST elevation myocardial infarction).    ASSESSMENT:    STEMI / CAD  - S/P PCI of RCA. For 2nd stage today.  - Cont ASA, Brilinta, and Lipitor  - Start ACEI or ARB if BP will tolerate    DM-2  - Continue current insulin regimen  - Monitor FS QACHS    5. CKD-2  6. HTN  - discontinue hydralazine  - monitor BP    /DL  7. COPD        COPD, moderate    HLD (hyperlipidemia)    Chronic systolic CHF (congestive heart failure)    Osteoarthritis    HTN (hypertension)      DVT Prophylaxis    GI Prophylaxis      Prognosis:    Disposition: FLOWER MARISCAL 63y Male  MRN#: [id number]   CODE STATUS: Full code      SUBJECTIVE    Patient is a 63y old male who presents with a chief complaint of post-cath chest pain (12 Oct 2018 13:28).  Currently admitted to medicine with the primary diagnosis of STEMI (ST elevation myocardial infarction).    Interval History: Today is hospital day 2d. Overnight there were no events. Today he is feeling well, no new complaints.    HPI:   HPI:  63 year old Male with pertinent medical history of Hypertension and CAD presented to the hospital for chest pain/ SOB and diaphoresis since the morning of admission. He recenty had cardiac cath with PCI to RCA on 10/8 and was discharged home less than 24 hours ago. He has also had a history of PCI to D1 in  which had thrombosed 3 days after stent placement. He had a MICS- CABG one month back. He was discharged on aspirin and plavix after the recent catheterization. 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  Type 2 diabetes mellitus: DM (diabetes mellitus), type 2  History of surgery to heart and great vessels, presenting hazards to health: x 4 in   Atherosclerosis of coronary artery: CAD (coronary artery disease)  Status post percutaneous transluminal coronary angioplasty: in   Stented coronary artery  History of surgery to major organs, presenting hazards to health: heart stents  Other postprocedural status: Fixation hardware in foot      ALLERGIES:  ACE inhibitors (Hives)  enalapril (Hives)      MEDICATIONS:  STANDING MEDICATIONS  aspirin  chewable 81 milliGRAM(s) Oral daily  atorvastatin 80 milliGRAM(s) Oral at bedtime  chlorhexidine 4% Liquid 1 Application(s) Topical <User Schedule>  dextrose 5%. 1000 milliLiter(s) IV Continuous <Continuous>  dextrose 50% Injectable 12.5 Gram(s) IV Push once  dextrose 50% Injectable 25 Gram(s) IV Push once  dextrose 50% Injectable 25 Gram(s) IV Push once  DULoxetine 90 milliGRAM(s) Oral daily  furosemide    Tablet 40 milliGRAM(s) Oral daily  metoprolol tartrate 25 milliGRAM(s) Oral two times a day  omega-3-Acid Ethyl Esters 4 Gram(s) Oral daily  PARoxetine 20 milliGRAM(s) Oral daily  sodium chloride 0.9%. 500 milliLiter(s) IV Continuous <Continuous>  ticagrelor 90 milliGRAM(s) Oral two times a day    PRN MEDICATIONS  dextrose 40% Gel 15 Gram(s) Oral once PRN  glucagon  Injectable 1 milliGRAM(s) IntraMuscular once PRN        OBJECTIVE    VITAL SIGNS: Last 24 Hours  T(C): 36.2 (12 Oct 2018 04:00), Max: 36.6 (11 Oct 2018 20:00)  T(F): 97.1 (12 Oct 2018 04:00), Max: 97.8 (11 Oct 2018 20:00)  HR: 88 (12 Oct 2018 08:00) (70 - 102)  BP: 102/63 (12 Oct 2018 08:00) (101/57 - 122/75)  BP(mean): 85 (12 Oct 2018 08:00) (62 - 89)  RR: 18 (12 Oct 2018 08:00) (14 - 36)  SpO2: 97% (12 Oct 2018 00:00) (97% - 98%)      PHYSICAL EXAM:    GENERAL: NAD, well-developed, AAOx3  HEENT:  Atraumatic, Normocephalic. EOMI, PERRLA, conjunctiva and sclera clear.  PULMONARY: Clear to auscultation bilaterally.  CARDIOVASCULAR: Regular rate and rhythm; No murmurs, rubs, or gallops.  MUSCULOSKELETAL:  Cath femoral access site with sutures, appropriately closed and without drainage.      LABS:                        11.9   15.00 )-----------( 248      ( 12 Oct 2018 06:07 )             37.0     10    137  |  98  |  26<H>  ----------------------------<  231<H>  4.9   |  25  |  1.1    Ca    8.9      12 Oct 2018 06:07  Mg     1.9     10-12    TPro  6.6  /  Alb  3.7  /  TBili  1.9<H>  /  DBili  x   /  AST  50<H>  /  ALT  40  /  AlkPhos  84  10-12      Urinalysis Basic - ( 11 Oct 2018 11:00 )    Color: Yellow / Appearance: Clear / S.015 / pH: x  Gluc: x / Ketone: Negative  / Bili: Negative / Urobili: 0.2 mg/dL   Blood: x / Protein: Negative mg/dL / Nitrite: Negative   Leuk Esterase: Negative / RBC: x / WBC x   Sq Epi: x / Non Sq Epi: x / Bacteria: x    RADIOLOGY:        ASSESSMENT AND PLAN    Patient is a 63y old male who presents with a chief complaint of post-cath chest pain (12 Oct 2018 13:28).  Currently admitted to medicine with the primary diagnosis of STEMI (ST elevation myocardial infarction).    ASSESSMENT:    STEMI / CAD  - S/P PCI of RCA. For 2nd stage today.  - Cont ASA, Brilinta, and Lipitor  - Start ACEI or ARB if BP will tolerate    DM-2  - Continue current insulin regimen  - Monitor FS QACHS    HTN  - discontinue hydralazine  - monitor BP    HFrEF  - Continue lasix      Prognosis: Stable    Disposition: Discharge home tomorrow

## 2018-10-12 NOTE — DISCHARGE NOTE ADULT - PATIENT PORTAL LINK FT
You can access the [facility] Portal, offered by [facility], by registering with the following website: [email or web address]

## 2018-10-12 NOTE — DISCHARGE NOTE ADULT - MEDICATION SUMMARY - MEDICATIONS TO TAKE
I will START or STAY ON the medications listed below when I get home from the hospital:    aspirin 81 mg oral delayed release tablet  -- 1 tab(s) by mouth once a day  -- Indication: For Heart disease    Cymbalta  -- 90 milligram(s) by mouth once a day  -- Indication: For Depression    Paxil 20 mg oral tablet  -- 1 tab(s) by mouth once a day  -- Indication: For Depression    Zetia 10 mg oral tablet  -- 1 tab(s) by mouth once a day  -- Indication: For Heart disease    Crestor 40 mg oral tablet  -- 1 tab(s) by mouth once a day (at bedtime)   -- Do not take dairy products, antacids, or iron preparations within one hour of this medication.  Do not take this drug if you are pregnant.  It is very important that you take or use this exactly as directed.  Do not skip doses or discontinue unless directed by your doctor.    -- Indication: For High cholesterol    ticagrelor 90 mg oral tablet  -- 1 tab(s) by mouth 2 times a day  -- Indication: For Heart disease    metoprolol tartrate 25 mg oral tablet  -- 1 tab(s) by mouth 2 times a day  -- Indication: For Heart disease    Lasix 40 mg oral tablet  -- 1 tab(s) by mouth once a day  -- Indication: For Heart failure    Omega-3 oral capsule  -- 1 tab(s) by mouth once a day  -- Indication: For Nutrition

## 2018-10-12 NOTE — DISCHARGE NOTE ADULT - PROVIDER TOKENS
ANTICOAGULATION FOLLOW-UP CLINIC VISIT    Patient Name:  Sohan Rendon  Date:  7/13/2018  Contact Type:  Telephone    SUBJECTIVE:        OBJECTIVE    INR   Date Value Ref Range Status   07/13/2018 2.27 (H) 0.86 - 1.14 Final     Chromogenic Factor 10   Date Value Ref Range Status   08/10/2014 24 (L) 70 - 130 % Final     Comment:     Therapeutic Range:  A Chromogenic Factor 10 level of approximately 20-40%   inversely correlates with an INR of 2-3 for patients receiving Warfarin.   Chromogenic Factor 10 levels below 20% indicate an INR greater than 3 and   levels above 40% indicate an INR less than 2.       Factor 2 Assay   Date Value Ref Range Status   07/21/2014 25 (L) 60 - 140 % Final     Comment:     Analyte Specific Reagents (ASRs) are used in many laboratory tests necessary   for   standard medical care and generally do not require FDA approval.  This test   was   developed and its performance characteristics determined by [facility].  It has not been cleared or approved by   the [place] Food and Drug Administration.         ASSESSMENT / PLAN  INR assessment THER    Recheck INR In: 3 DAYS    [facility] Location [facility]      Anticoagulation Summary as of 7/13/2018     INR goal    Prior goal 1.8-2.5   Today's INR 1.9   Warfarin maintenance plan No maintenance plan   Full warfarin instructions 7/13: 6 mg; 7/14: 5 mg; 7/15: 5 mg   Weekly warfarin total 25.5 mg   Plan last modified Blanca Bah RN (4/5/2018)   Next INR check 7/16/2018   Priority INR   Target end date Indefinite    Indications   LVAD (left ventricular assist device) present [Z95.811]  Long-term (current) use of anticoagulants [Z79.01] [Z79.01]         Anticoagulation Episode Summary     INR check location     Preferred lab     Send INR reminders to [facility]    Comments Goal Range is 1.8-2.3 5/14/18 Restarted ASA 81mg Daily   [facility] comes out to draw INR  Yancy Kraft  [phone]  Daughter Almaz  [phone]      Anticoagulation Care Providers     Provider Role Specialty Phone number    Evon Lemons MD Responsible Cardiology [phone]            See the Encounter Report to view Anticoagulation Flowsheet and Dosing Calendar (Go to Encounters tab in chart review, and find the Anticoagulation Therapy Visit)  Spoke with Carolyn [facility] nurse.    Dafne Sanders RN                TOKEN:'[id number]',TOKEN:'[id number]'

## 2018-10-13 VITALS
SYSTOLIC BLOOD PRESSURE: 102 MMHG | OXYGEN SATURATION: 99 % | HEART RATE: 82 BPM | DIASTOLIC BLOOD PRESSURE: 59 MMHG | RESPIRATION RATE: 20 BRPM

## 2018-10-13 LAB
ALBUMIN SERPL ELPH-MCNC: 3.7 G/DL — SIGNIFICANT CHANGE UP (ref 3.5–5.2)
ALP SERPL-CCNC: 84 U/L — SIGNIFICANT CHANGE UP (ref 30–115)
ALT FLD-CCNC: 30 U/L — SIGNIFICANT CHANGE UP (ref 0–41)
ANION GAP SERPL CALC-SCNC: 13 MMOL/L — SIGNIFICANT CHANGE UP (ref 7–14)
AST SERPL-CCNC: 30 U/L — SIGNIFICANT CHANGE UP (ref 0–41)
BILIRUB SERPL-MCNC: 1.9 MG/DL — HIGH (ref 0.2–1.2)
BUN SERPL-MCNC: 28 MG/DL — HIGH (ref 10–20)
CALCIUM SERPL-MCNC: 9 MG/DL — SIGNIFICANT CHANGE UP (ref 8.5–10.1)
CHLORIDE SERPL-SCNC: 99 MMOL/L — SIGNIFICANT CHANGE UP (ref 98–110)
CO2 SERPL-SCNC: 26 MMOL/L — SIGNIFICANT CHANGE UP (ref 17–32)
CREAT SERPL-MCNC: 1.1 MG/DL — SIGNIFICANT CHANGE UP (ref 0.7–1.5)
GLUCOSE BLDC GLUCOMTR-MCNC: 200 MG/DL — HIGH (ref 70–99)
GLUCOSE BLDC GLUCOMTR-MCNC: 220 MG/DL — HIGH (ref 70–99)
GLUCOSE BLDC GLUCOMTR-MCNC: 285 MG/DL — HIGH (ref 70–99)
GLUCOSE BLDC GLUCOMTR-MCNC: 471 MG/DL — CRITICAL HIGH (ref 70–99)
GLUCOSE BLDC GLUCOMTR-MCNC: 478 MG/DL — CRITICAL HIGH (ref 70–99)
GLUCOSE SERPL-MCNC: 202 MG/DL — HIGH (ref 70–99)
HCT VFR BLD CALC: 37.6 % — LOW (ref 42–52)
HGB BLD-MCNC: 12.3 G/DL — LOW (ref 14–18)
MAGNESIUM SERPL-MCNC: 2 MG/DL — SIGNIFICANT CHANGE UP (ref 1.8–2.4)
MCHC RBC-ENTMCNC: 26 PG — LOW (ref 27–31)
MCHC RBC-ENTMCNC: 32.7 G/DL — SIGNIFICANT CHANGE UP (ref 32–37)
MCV RBC AUTO: 79.5 FL — LOW (ref 80–94)
NRBC # BLD: 0 /100 WBCS — SIGNIFICANT CHANGE UP (ref 0–0)
PLATELET # BLD AUTO: 267 K/UL — SIGNIFICANT CHANGE UP (ref 130–400)
POTASSIUM SERPL-MCNC: 4.4 MMOL/L — SIGNIFICANT CHANGE UP (ref 3.5–5)
POTASSIUM SERPL-SCNC: 4.4 MMOL/L — SIGNIFICANT CHANGE UP (ref 3.5–5)
PROT SERPL-MCNC: 6.7 G/DL — SIGNIFICANT CHANGE UP (ref 6–8)
RBC # BLD: 4.73 M/UL — SIGNIFICANT CHANGE UP (ref 4.7–6.1)
RBC # FLD: 14.9 % — HIGH (ref 11.5–14.5)
SODIUM SERPL-SCNC: 138 MMOL/L — SIGNIFICANT CHANGE UP (ref 135–146)
WBC # BLD: 13.47 K/UL — HIGH (ref 4.8–10.8)
WBC # FLD AUTO: 13.47 K/UL — HIGH (ref 4.8–10.8)

## 2018-10-13 RX ORDER — INSULIN LISPRO 100/ML
15 VIAL (ML) SUBCUTANEOUS ONCE
Qty: 0 | Refills: 0 | Status: COMPLETED | OUTPATIENT
Start: 2018-10-13 | End: 2018-10-13

## 2018-10-13 RX ADMIN — Medication 20 MILLIGRAM(S): at 12:41

## 2018-10-13 RX ADMIN — DULOXETINE HYDROCHLORIDE 90 MILLIGRAM(S): 30 CAPSULE, DELAYED RELEASE ORAL at 12:43

## 2018-10-13 RX ADMIN — TICAGRELOR 90 MILLIGRAM(S): 90 TABLET ORAL at 05:31

## 2018-10-13 RX ADMIN — Medication 40 MILLIGRAM(S): at 05:31

## 2018-10-13 RX ADMIN — Medication 81 MILLIGRAM(S): at 12:38

## 2018-10-13 RX ADMIN — Medication 15 UNIT(S): at 14:52

## 2018-10-13 RX ADMIN — Medication 25 MILLIGRAM(S): at 05:31

## 2018-10-13 RX ADMIN — TICAGRELOR 90 MILLIGRAM(S): 90 TABLET ORAL at 17:07

## 2018-10-13 RX ADMIN — Medication 25 MILLIGRAM(S): at 17:07

## 2018-10-13 NOTE — PROGRESS NOTE ADULT - ASSESSMENT
Patient is a 63y old male who presents with a chief complaint of post-cath chest pain  Currently admitted to medicine with the primary diagnosis of STEMI (ST elevation myocardial infarction).    ASSESSMENT:    STEMI / CAD  - S/P PCI of RCA. For 2nd stage today.  - On ASA, Brilinta, Lipitor and Metoprolol  Will hold on adding ARB until the BP is stable. Pt states that he is allergic to ACE-I.    DM-2  - Continue current insulin regimen  - Monitor FS QACHS    HTN  - discontinue hydralazine  - monitor BP    HFrEF  - Continue lasix      Prognosis: Stable    Disposition: Discharge home today

## 2018-10-13 NOTE — PROGRESS NOTE ADULT - ASSESSMENT
A 63 years old male presented to the ER with cp, sob and diaphoresis which started around 3 AM on the day of admission. A day earlier on 10/8 pt had a VERONICA placed in RCA at [facility]. In ER EKG showed STEMI and was taken to cath lab.    1.	STEMI S/P RCA and OM1 VERONICA  2.	CAD/HTN/DL  3.	DM-2  4.	Obesity  5.	COPD  6.	CKD-2          PLAN:    ·	Care D/W the card Dr. Alford. Cleared the pt to D/C home.  ·	On ASA, Brilinta, Lipitor and Metoprolol  ·	Will hold on adding ARB until the BP is stable. Pt states that he is allergic to ACE-I. D/W the card. He will take care of it as an out pt.    * Med rec reviewed with the intern. Plan of care D/W the pt in detail. Dr. Alford recommended to F/U with him in one week. Time spent 36 minutes.

## 2018-10-13 NOTE — PROGRESS NOTE ADULT - SUBJECTIVE AND OBJECTIVE BOX
FLOWER MARISCAL  63y Male    CHIEF COMPLAINT:    Patient is a 63y old  Male who presents with a chief complaint of STEMI (12 Oct 2018 17:11)      INTERVAL HPI/OVERNIGHT EVENTS:    Patient seen and examined. No cp. No sob. No palpitations.    ROS: All other systems are negative.    Vital Signs:    T(F): 97 (10-13-18 @ 08:00), Max: 97.8 (10-13-18 @ 00:00)  HR: 90 (10-13-18 @ 08:00) (60 - 90)  BP: 125/59 (10-13-18 @ 08:00) (96/64 - 127/65)  RR: 19 (10-13-18 @ 08:00) (18 - 31)  SpO2: 99% (10-13-18 @ 08:00) (97% - 99%)  I&O's Summary    12 Oct 2018 07:  -  13 Oct 2018 07:00  --------------------------------------------------------  IN: 0 mL / OUT: 1100 mL / NET: -1100 mL    13 Oct 2018 07:01  -  13 Oct 2018 08:31  --------------------------------------------------------  IN: 1000 mL / OUT: 0 mL / NET: 1000 mL      Daily     Daily Weight in k.8 (13 Oct 2018 04:00)  CAPILLARY BLOOD GLUCOSE  285 (13 Oct 2018 08:00)      POCT Blood Glucose.: 285 mg/dL (13 Oct 2018 08:10)      PHYSICAL EXAM:    GENERAL:  NAD  SKIN: No rashes or lesions  HENT: Atrumatic. Normocephalic. PERRL. Moist membranes.  NECK: Supple, No JVD. No lymphadenopathy.  PULMONARY: CTA B/L. No wheezing. No rales  CVS: Normal S1, S2. Rate and Rythm are regular. No murmurs.  ABDOMEN/GI: Soft, Nontender, Nondistended; BS present  EXTREMITIES: Peripheral pulses intact. No edema B/L LE.  NEUROLOGIC:  No motor or sensory deficit.  PSYCH: Alert & oriented x 3    Consultant(s) Notes Reviewed:  [x ] YES  [ ] NO  Care Discussed with Consultants/Other Providers [ x] YES  [ ] NO    EKG reviewed  Telemetry reviewed    LABS:                        12.3   13.47 )-----------( 267      ( 13 Oct 2018 05:39 )             37.6     10    138  |  99  |  28<H>  ----------------------------<  202<H>  4.4   |  26  |  1.1    Ca    9.0      13 Oct 2018 05:39  Mg     2.0     10-13    TPro  6.7  /  Alb  3.7  /  TBili  1.9<H>  /  DBili  x   /  AST  30  /  ALT  30  /  AlkPhos  84  10-13              RADIOLOGY & ADDITIONAL TESTS:      Imaging or report Personally Reviewed:  [ ] YES  [ ] NO    Medications:  Standing  aspirin  chewable 81 milliGRAM(s) Oral daily  atorvastatin 80 milliGRAM(s) Oral at bedtime  chlorhexidine 4% Liquid 1 Application(s) Topical <User Schedule>  dextrose 5%. 1000 milliLiter(s) IV Continuous <Continuous>  dextrose 50% Injectable 12.5 Gram(s) IV Push once  dextrose 50% Injectable 25 Gram(s) IV Push once  dextrose 50% Injectable 25 Gram(s) IV Push once  DULoxetine 90 milliGRAM(s) Oral daily  furosemide    Tablet 40 milliGRAM(s) Oral daily  metoprolol tartrate 25 milliGRAM(s) Oral two times a day  omega-3-Acid Ethyl Esters 4 Gram(s) Oral daily  PARoxetine 20 milliGRAM(s) Oral daily  sodium chloride 0.9%. 500 milliLiter(s) IV Continuous <Continuous>  ticagrelor 90 milliGRAM(s) Oral two times a day    PRN Meds  dextrose 40% Gel 15 Gram(s) Oral once PRN  glucagon  Injectable 1 milliGRAM(s) IntraMuscular once PRN      Case discussed with resident    Care discussed with pt/family

## 2018-10-13 NOTE — PROGRESS NOTE ADULT - SUBJECTIVE AND OBJECTIVE BOX
SUBJECTIVE:    Patient is a 63y old Male who presents with a chief complaint of STEMI (13 Oct 2018 08:30)    Currently admitted to medicine with the primary diagnosis of STEMI (ST elevation myocardial infarction)     Today is hospital day 3d. This morning he is resting comfortably in bed and reports no new issues or overnight events.     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     ALLERGIES:  ACE inhibitors (Hives)  enalapril (Hives)    MEDICATIONS:  STANDING MEDICATIONS  aspirin  chewable 81 milliGRAM(s) Oral daily  atorvastatin 80 milliGRAM(s) Oral at bedtime  chlorhexidine 4% Liquid 1 Application(s) Topical <User Schedule>  dextrose 5%. 1000 milliLiter(s) IV Continuous <Continuous>  dextrose 50% Injectable 12.5 Gram(s) IV Push once  dextrose 50% Injectable 25 Gram(s) IV Push once  dextrose 50% Injectable 25 Gram(s) IV Push once  DULoxetine 90 milliGRAM(s) Oral daily  furosemide    Tablet 40 milliGRAM(s) Oral daily  metoprolol tartrate 25 milliGRAM(s) Oral two times a day  omega-3-Acid Ethyl Esters 4 Gram(s) Oral daily  PARoxetine 20 milliGRAM(s) Oral daily  sodium chloride 0.9%. 500 milliLiter(s) IV Continuous <Continuous>  ticagrelor 90 milliGRAM(s) Oral two times a day    PRN MEDICATIONS  dextrose 40% Gel 15 Gram(s) Oral once PRN  glucagon  Injectable 1 milliGRAM(s) IntraMuscular once PRN    VITALS:   T(F): 97  HR: 91  BP: 116/60  RR: 20  SpO2: 98%    LABS:                        12.3   13.47 )-----------( 267      ( 13 Oct 2018 05:39 )             37.6     10-13    138  |  99  |  28<H>  ----------------------------<  202<H>  4.4   |  26  |  1.1    Ca    9.0      13 Oct 2018 05:39  Mg     2.0     10-13    TPro  6.7  /  Alb  3.7  /  TBili  1.9<H>  /  DBili  x   /  AST  30  /  ALT  30  /  AlkPhos  84  10-13        RADIOLOGY:    PHYSICAL EXAM:  GENERAL:  NAD  SKIN: No rashes or lesions  HENT: Atrumatic. Normocephalic. PERRL. Moist membranes.  NECK: Supple, No JVD. No lymphadenopathy.  PULMONARY: CTA B/L. No wheezing. No rales  CVS: Normal S1, S2. Rate and Rythm are regular. No murmurs.  ABDOMEN/GI: Soft, Nontender, Nondistended; BS present  EXTREMITIES: Peripheral pulses intact. No edema B/L LE.  NEUROLOGIC:  No motor or sensory deficit.  PSYCH: Alert & oriented x 3

## 2018-10-15 LAB — GLUCOSE BLDC GLUCOMTR-MCNC: 267 MG/DL — HIGH (ref 70–99)

## 2018-10-15 PROCEDURE — 85027 COMPLETE CBC AUTOMATED: CPT

## 2018-10-15 PROCEDURE — 36415 COLL VENOUS BLD VENIPUNCTURE: CPT

## 2018-10-15 PROCEDURE — 85730 THROMBOPLASTIN TIME PARTIAL: CPT

## 2018-10-15 PROCEDURE — C1725: CPT

## 2018-10-15 PROCEDURE — 93005 ELECTROCARDIOGRAM TRACING: CPT

## 2018-10-15 PROCEDURE — 82553 CREATINE MB FRACTION: CPT

## 2018-10-15 PROCEDURE — C1769: CPT

## 2018-10-15 PROCEDURE — 86140 C-REACTIVE PROTEIN: CPT

## 2018-10-15 PROCEDURE — 80061 LIPID PANEL: CPT

## 2018-10-15 PROCEDURE — 82248 BILIRUBIN DIRECT: CPT

## 2018-10-15 PROCEDURE — 82962 GLUCOSE BLOOD TEST: CPT

## 2018-10-15 PROCEDURE — 85025 COMPLETE CBC W/AUTO DIFF WBC: CPT

## 2018-10-15 PROCEDURE — C1874: CPT

## 2018-10-15 PROCEDURE — 80048 BASIC METABOLIC PNL TOTAL CA: CPT

## 2018-10-15 PROCEDURE — C1887: CPT

## 2018-10-15 PROCEDURE — C1894: CPT

## 2018-10-15 PROCEDURE — 83036 HEMOGLOBIN GLYCOSYLATED A1C: CPT

## 2018-10-15 PROCEDURE — 83735 ASSAY OF MAGNESIUM: CPT

## 2018-10-15 PROCEDURE — 85610 PROTHROMBIN TIME: CPT

## 2018-10-15 PROCEDURE — 82550 ASSAY OF CK (CPK): CPT

## 2018-10-15 PROCEDURE — 80053 COMPREHEN METABOLIC PANEL: CPT

## 2018-10-22 ENCOUNTER — APPOINTMENT (OUTPATIENT)
Dept: HEART AND VASCULAR | Facility: CLINIC | Age: 63
End: 2018-10-22

## 2018-10-22 DIAGNOSIS — E11.49 TYPE 2 DIABETES MELLITUS WITH OTHER DIABETIC NEUROLOGICAL COMPLICATION: ICD-10-CM

## 2018-10-22 DIAGNOSIS — I13.0 HYPERTENSIVE HEART AND CHRONIC KIDNEY DISEASE WITH HEART FAILURE AND STAGE 1 THROUGH STAGE 4 CHRONIC KIDNEY DISEASE, OR UNSPECIFIED CHRONIC KIDNEY DISEASE: ICD-10-CM

## 2018-10-22 DIAGNOSIS — Z79.4 LONG TERM (CURRENT) USE OF INSULIN: ICD-10-CM

## 2018-10-22 DIAGNOSIS — I21.3 ST ELEVATION (STEMI) MYOCARDIAL INFARCTION OF UNSPECIFIED SITE: ICD-10-CM

## 2018-10-22 DIAGNOSIS — E11.22 TYPE 2 DIABETES MELLITUS WITH DIABETIC CHRONIC KIDNEY DISEASE: ICD-10-CM

## 2018-10-22 DIAGNOSIS — J44.9 CHRONIC OBSTRUCTIVE PULMONARY DISEASE, UNSPECIFIED: ICD-10-CM

## 2018-10-22 DIAGNOSIS — E11.40 TYPE 2 DIABETES MELLITUS WITH DIABETIC NEUROPATHY, UNSPECIFIED: ICD-10-CM
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
## 2018-10-22 DIAGNOSIS — E66.9 OBESITY, UNSPECIFIED: ICD-10-CM

## 2018-10-22 DIAGNOSIS — I50.22 CHRONIC SYSTOLIC (CONGESTIVE) HEART FAILURE: ICD-10-CM

## 2018-10-22 DIAGNOSIS — I25.119 ATHEROSCLEROTIC HEART DISEASE OF NATIVE CORONARY ARTERY WITH UNSPECIFIED ANGINA PECTORIS: ICD-10-CM

## 2018-10-22 DIAGNOSIS — Z95.5 PRESENCE OF CORONARY ANGIOPLASTY IMPLANT AND GRAFT: ICD-10-CM

## 2018-10-22 DIAGNOSIS — T82.868A THROMBOSIS DUE TO VASCULAR PROSTHETIC DEVICES, IMPLANTS AND GRAFTS, INITIAL ENCOUNTER: ICD-10-CM

## 2018-10-22 DIAGNOSIS — Z95.1 PRESENCE OF AORTOCORONARY BYPASS GRAFT: ICD-10-CM

## 2018-10-22 DIAGNOSIS — N18.2 CHRONIC KIDNEY DISEASE, STAGE 2 (MILD): ICD-10-CM

## 2018-10-29 ENCOUNTER — NON-APPOINTMENT (OUTPATIENT)
Age: 63
End: 2018-10-29

## 2018-10-29 ENCOUNTER — APPOINTMENT (OUTPATIENT)
Dept: HEART AND VASCULAR | Facility: CLINIC | Age: 63
End: 2018-10-29
Payer: COMMERCIAL

## 2018-10-29 ENCOUNTER — APPOINTMENT (OUTPATIENT)
Dept: ENDOCRINOLOGY | Facility: CLINIC | Age: 63
End: 2018-10-29
Payer: COMMERCIAL

## 2018-10-29 VITALS
BODY MASS INDEX: 33.66 KG/M2 | SYSTOLIC BLOOD PRESSURE: 112 MMHG | WEIGHT: 254 LBS | HEIGHT: 73 IN | DIASTOLIC BLOOD PRESSURE: 64 MMHG | HEART RATE: 68 BPM

## 2018-10-29 VITALS
BODY MASS INDEX: 33.66 KG/M2 | SYSTOLIC BLOOD PRESSURE: 132 MMHG | HEIGHT: 73 IN | DIASTOLIC BLOOD PRESSURE: 67 MMHG | HEART RATE: 68 BPM | WEIGHT: 254 LBS

## 2018-10-29 VITALS — HEART RATE: 71 BPM

## 2018-10-29 DIAGNOSIS — R07.89 OTHER CHEST PAIN: ICD-10-CM

## 2018-10-29 PROCEDURE — 82962 GLUCOSE BLOOD TEST: CPT

## 2018-10-29 PROCEDURE — 95250 CONT GLUC MNTR PHYS/QHP EQP: CPT

## 2018-10-29 PROCEDURE — 95251 CONT GLUC MNTR ANALYSIS I&R: CPT

## 2018-10-29 PROCEDURE — 99214 OFFICE O/P EST MOD 30 MIN: CPT | Mod: 25

## 2018-10-29 PROCEDURE — 93000 ELECTROCARDIOGRAM COMPLETE: CPT

## 2018-10-29 RX ORDER — CLOPIDOGREL BISULFATE 75 MG/1
75 TABLET, FILM COATED ORAL DAILY
Qty: 30 | Refills: 5 | Status: DISCONTINUED | COMMUNITY
End: 2018-10-29

## 2018-10-30 DIAGNOSIS — I25.2 OLD MYOCARDIAL INFARCTION: ICD-10-CM

## 2018-10-30 DIAGNOSIS — T82.867A THROMBOSIS DUE CARDIAC PROSTHETIC DEVICES, IMPLANTS AND GRAFTS, INITIAL ENCOUNTER: ICD-10-CM

## 2018-10-30 LAB — GLUCOSE BLDC GLUCOMTR-MCNC: 116

## 2018-11-05 PROBLEM — T82.867A CORONARY STENT THROMBOSIS: Status: ACTIVE | Noted: 2018-11-05

## 2018-11-05 PROBLEM — I25.2 HISTORY OF MYOCARDIAL INFARCTION: Status: RESOLVED | Noted: 2018-05-30 | Resolved: 2018-11-05

## 2018-11-08 ENCOUNTER — APPOINTMENT (OUTPATIENT)
Dept: ENDOCRINOLOGY | Facility: CLINIC | Age: 63
End: 2018-11-08

## 2018-11-13 ENCOUNTER — APPOINTMENT (OUTPATIENT)
Dept: ENDOCRINOLOGY | Facility: CLINIC | Age: 63
End: 2018-11-13
Payer: COMMERCIAL

## 2018-11-13 VITALS
DIASTOLIC BLOOD PRESSURE: 57 MMHG | BODY MASS INDEX: 33.8 KG/M2 | SYSTOLIC BLOOD PRESSURE: 98 MMHG | HEIGHT: 73 IN | WEIGHT: 255 LBS | HEART RATE: 68 BPM

## 2018-11-13 PROCEDURE — 82962 GLUCOSE BLOOD TEST: CPT

## 2018-11-13 PROCEDURE — 97802 MEDICAL NUTRITION INDIV IN: CPT

## 2018-11-13 PROCEDURE — 99214 OFFICE O/P EST MOD 30 MIN: CPT | Mod: 25

## 2018-11-15 LAB — GLUCOSE BLDC GLUCOMTR-MCNC: 148

## 2018-11-16 ENCOUNTER — TRANSCRIPTION ENCOUNTER (OUTPATIENT)
Age: 63
End: 2018-11-16

## 2018-11-18 LAB
ALBUMIN SERPL ELPH-MCNC: 4.3 G/DL
ALP BLD-CCNC: 93 U/L
ALT SERPL-CCNC: 39 U/L
ANION GAP SERPL CALC-SCNC: 11 MMOL/L
AST SERPL-CCNC: 23 U/L
BILIRUB SERPL-MCNC: 0.3 MG/DL
BUN SERPL-MCNC: 27 MG/DL
CALCIUM SERPL-MCNC: 9.4 MG/DL
CHLORIDE SERPL-SCNC: 100 MMOL/L
CHOLEST SERPL-MCNC: 189 MG/DL
CHOLEST/HDLC SERPL: 4.3 RATIO
CO2 SERPL-SCNC: 29 MMOL/L
CREAT SERPL-MCNC: 1.05 MG/DL
CREAT SPEC-SCNC: 75 MG/DL
FOLATE SERPL-MCNC: 9.8 NG/ML
GLUCOSE BLDC GLUCOMTR-MCNC: 255
GLUCOSE SERPL-MCNC: 239 MG/DL
HBA1C MFR BLD HPLC: 9.7 %
HDLC SERPL-MCNC: 44 MG/DL
LDLC SERPL CALC-MCNC: 123 MG/DL
MICROALBUMIN 24H UR DL<=1MG/L-MCNC: 6.3 MG/DL
MICROALBUMIN/CREAT 24H UR-RTO: 84 MG/G
POTASSIUM SERPL-SCNC: 5.3 MMOL/L
PROT SERPL-MCNC: 7.4 G/DL
SODIUM SERPL-SCNC: 140 MMOL/L
TRIGL SERPL-MCNC: 110 MG/DL
TSH SERPL-ACNC: 3.4 UIU/ML
VIT B12 SERPL-MCNC: 479 PG/ML

## 2018-11-20 ENCOUNTER — FORM ENCOUNTER (OUTPATIENT)
Age: 63
End: 2018-11-20

## 2018-11-25 ENCOUNTER — INPATIENT (INPATIENT)
Facility: HOSPITAL | Age: 63
LOS: 1 days | Discharge: HOME | End: 2018-11-27
Attending: INTERNAL MEDICINE | Admitting: INTERNAL MEDICINE

## 2018-11-25 VITALS
SYSTOLIC BLOOD PRESSURE: 105 MMHG | DIASTOLIC BLOOD PRESSURE: 59 MMHG | TEMPERATURE: 98 F | RESPIRATION RATE: 18 BRPM | HEART RATE: 75 BPM | OXYGEN SATURATION: 96 %

## 2018-11-25 DIAGNOSIS — Z95.5 PRESENCE OF CORONARY ANGIOPLASTY IMPLANT AND GRAFT: Chronic | ICD-10-CM

## 2018-11-25 LAB
ANION GAP SERPL CALC-SCNC: 17 MMOL/L — HIGH (ref 7–14)
BASOPHILS # BLD AUTO: 0.04 K/UL — SIGNIFICANT CHANGE UP (ref 0–0.2)
BASOPHILS NFR BLD AUTO: 0.4 % — SIGNIFICANT CHANGE UP (ref 0–1)
BUN SERPL-MCNC: 36 MG/DL — HIGH (ref 10–20)
CALCIUM SERPL-MCNC: 8.9 MG/DL — SIGNIFICANT CHANGE UP (ref 8.5–10.1)
CHLORIDE SERPL-SCNC: 100 MMOL/L — SIGNIFICANT CHANGE UP (ref 98–110)
CO2 SERPL-SCNC: 23 MMOL/L — SIGNIFICANT CHANGE UP (ref 17–32)
CREAT SERPL-MCNC: 1.3 MG/DL — SIGNIFICANT CHANGE UP (ref 0.7–1.5)
D DIMER BLD IA.RAPID-MCNC: 263 NG/ML DDU — HIGH (ref 0–230)
EOSINOPHIL # BLD AUTO: 0.26 K/UL — SIGNIFICANT CHANGE UP (ref 0–0.7)
EOSINOPHIL NFR BLD AUTO: 2.3 % — SIGNIFICANT CHANGE UP (ref 0–8)
GLUCOSE BLDC GLUCOMTR-MCNC: 123 MG/DL — HIGH (ref 70–99)
GLUCOSE SERPL-MCNC: 302 MG/DL — HIGH (ref 70–99)
HCT VFR BLD CALC: 35.6 % — LOW (ref 42–52)
HGB BLD-MCNC: 11.2 G/DL — LOW (ref 14–18)
IMM GRANULOCYTES NFR BLD AUTO: 0.3 % — SIGNIFICANT CHANGE UP (ref 0.1–0.3)
INR BLD: 1.07 RATIO — SIGNIFICANT CHANGE UP (ref 0.65–1.3)
LYMPHOCYTES # BLD AUTO: 1.35 K/UL — SIGNIFICANT CHANGE UP (ref 1.2–3.4)
LYMPHOCYTES # BLD AUTO: 12.1 % — LOW (ref 20.5–51.1)
MAGNESIUM SERPL-MCNC: 2.3 MG/DL — SIGNIFICANT CHANGE UP (ref 1.8–2.4)
MCHC RBC-ENTMCNC: 25.6 PG — LOW (ref 27–31)
MCHC RBC-ENTMCNC: 31.5 G/DL — LOW (ref 32–37)
MCV RBC AUTO: 81.5 FL — SIGNIFICANT CHANGE UP (ref 80–94)
MONOCYTES # BLD AUTO: 1.14 K/UL — HIGH (ref 0.1–0.6)
MONOCYTES NFR BLD AUTO: 10.2 % — HIGH (ref 1.7–9.3)
NEUTROPHILS # BLD AUTO: 8.33 K/UL — HIGH (ref 1.4–6.5)
NEUTROPHILS NFR BLD AUTO: 74.7 % — SIGNIFICANT CHANGE UP (ref 42.2–75.2)
NRBC # BLD: 0 /100 WBCS — SIGNIFICANT CHANGE UP (ref 0–0)
NT-PROBNP SERPL-SCNC: 871 PG/ML — HIGH (ref 0–300)
PLATELET # BLD AUTO: 276 K/UL — SIGNIFICANT CHANGE UP (ref 130–400)
POTASSIUM SERPL-MCNC: 4.9 MMOL/L — SIGNIFICANT CHANGE UP (ref 3.5–5)
POTASSIUM SERPL-SCNC: 4.9 MMOL/L — SIGNIFICANT CHANGE UP (ref 3.5–5)
PROTHROM AB SERPL-ACNC: 12.3 SEC — SIGNIFICANT CHANGE UP (ref 9.95–12.87)
RBC # BLD: 4.37 M/UL — LOW (ref 4.7–6.1)
RBC # FLD: 15.9 % — HIGH (ref 11.5–14.5)
SODIUM SERPL-SCNC: 140 MMOL/L — SIGNIFICANT CHANGE UP (ref 135–146)
TROPONIN T SERPL-MCNC: 0.04 NG/ML — CRITICAL HIGH
TROPONIN T SERPL-MCNC: 0.04 NG/ML — CRITICAL HIGH
WBC # BLD: 11.15 K/UL — HIGH (ref 4.8–10.8)
WBC # FLD AUTO: 11.15 K/UL — HIGH (ref 4.8–10.8)

## 2018-11-25 RX ORDER — FUROSEMIDE 40 MG
40 TABLET ORAL DAILY
Qty: 0 | Refills: 0 | Status: DISCONTINUED | OUTPATIENT
Start: 2018-11-25 | End: 2018-11-27

## 2018-11-25 RX ORDER — METOPROLOL TARTRATE 50 MG
25 TABLET ORAL
Qty: 0 | Refills: 0 | Status: DISCONTINUED | OUTPATIENT
Start: 2018-11-25 | End: 2018-11-25

## 2018-11-25 RX ORDER — METOPROLOL TARTRATE 50 MG
100 TABLET ORAL DAILY
Qty: 0 | Refills: 0 | Status: DISCONTINUED | OUTPATIENT
Start: 2018-11-25 | End: 2018-11-27

## 2018-11-25 RX ORDER — PANTOPRAZOLE SODIUM 20 MG/1
40 TABLET, DELAYED RELEASE ORAL
Qty: 0 | Refills: 0 | Status: DISCONTINUED | OUTPATIENT
Start: 2018-11-25 | End: 2018-11-27

## 2018-11-25 RX ORDER — TICAGRELOR 90 MG/1
90 TABLET ORAL EVERY 12 HOURS
Qty: 0 | Refills: 0 | Status: DISCONTINUED | OUTPATIENT
Start: 2018-11-25 | End: 2018-11-27

## 2018-11-25 RX ORDER — ASPIRIN/CALCIUM CARB/MAGNESIUM 324 MG
81 TABLET ORAL DAILY
Qty: 0 | Refills: 0 | Status: DISCONTINUED | OUTPATIENT
Start: 2018-11-25 | End: 2018-11-27

## 2018-11-25 RX ORDER — OMEGA-3 ACID ETHYL ESTERS 1 G
4 CAPSULE ORAL DAILY
Qty: 0 | Refills: 0 | Status: DISCONTINUED | OUTPATIENT
Start: 2018-11-25 | End: 2018-11-27

## 2018-11-25 RX ORDER — ASPIRIN/CALCIUM CARB/MAGNESIUM 324 MG
325 TABLET ORAL ONCE
Qty: 0 | Refills: 0 | Status: COMPLETED | OUTPATIENT
Start: 2018-11-25 | End: 2018-11-25

## 2018-11-25 RX ORDER — ATORVASTATIN CALCIUM 80 MG/1
80 TABLET, FILM COATED ORAL AT BEDTIME
Qty: 0 | Refills: 0 | Status: DISCONTINUED | OUTPATIENT
Start: 2018-11-25 | End: 2018-11-27

## 2018-11-25 RX ORDER — ENOXAPARIN SODIUM 100 MG/ML
40 INJECTION SUBCUTANEOUS DAILY
Qty: 0 | Refills: 0 | Status: DISCONTINUED | OUTPATIENT
Start: 2018-11-25 | End: 2018-11-27

## 2018-11-25 RX ORDER — DULOXETINE HYDROCHLORIDE 30 MG/1
90 CAPSULE, DELAYED RELEASE ORAL DAILY
Qty: 0 | Refills: 0 | Status: DISCONTINUED | OUTPATIENT
Start: 2018-11-25 | End: 2018-11-27

## 2018-11-25 RX ADMIN — TICAGRELOR 90 MILLIGRAM(S): 90 TABLET ORAL at 22:49

## 2018-11-25 RX ADMIN — Medication 325 MILLIGRAM(S): at 15:18

## 2018-11-25 NOTE — ED PROVIDER NOTE - NS ED ROS FT
Constitutional: no fevers/chills, no sick contacts  Eyes: No visual changes, eye pain or discharge. No photophobia  ENMT: No hearing changes, pain, discharge or infections. No sore throat or drooling.  Neck:  No neck pain or stiffness. No limited ROM  Cardiac: + SOB, +orthopnea, +dyspnea on exertion  Respiratory: No cough or respiratory distress. No hemoptysis. No history of asthma or RAD  GI: No nausea, vomiting, diarrhea or abdominal pain  : No dysuria, frequency or burning. No discharge  MS: No myalgia, muscle weakness, joint pain or back pain  Neuro: No headache or weakness. No LOC  Skin: No skin rash  Endo: no diabetes or thyroid dysfunction  Heme: no abnormal bleeding or clotting  Except as documented in the HPI, all other systems are negative

## 2018-11-25 NOTE — H&P ADULT - HISTORY OF PRESENT ILLNESS
62 yo M CAD s/p CABG and PCI s/p stent thrombosis now on Brilinta (failed Plavix), HFrEF (reports last echo 35-40% LVEF), HTN, HLD, IDDM (on insulin pump), and Depression who presents for few days hx of worsening dyspnea on exertion, orthopnea, generalized fatigue w/o chest pain. Patient reports that he has to use 3 pillows to sleep.   Has been somewhat physically active, walking around his house, preparing to return to work next week.  No leg edema.  Has been compliant with his medications.  He recently had cardiac cath on Oct 10 and he was found to have thrombosis of stent in distal RCA placed that was placed 36 h prior at [place] [facility], s/p PCI to RCA. Patient was switched from Plavix to Brilinta that time and discharge home.. 64 yo M CAD s/p CABG and PCI s/p stent thrombosis now on Brilinta (failed Plavix), HFrEF (reports last echo 35-40% LVEF), HTN, HLD, IDDM (on insulin pump), and Depression who presents for 3d hx of SOB when lying flat. Patient reports that he has to use 3 pillows to sleep. Denied SOB when walking. Has been somewhat physically active, walking around his house, preparing to return to work next week. No leg edema. Has been compliant with all his medications.  He recently had cardiac cath on Oct 10 and he was found to have thrombosis of stent in distal RCA placed that was placed 36h prior at [facility], s/p PCI to RCA again. Patient was switched from Plavix to Brilinta that time and discharge home..

## 2018-11-25 NOTE — ED PROVIDER NOTE - MEDICAL DECISION MAKING DETAILS
63yM CAD s/p CABG and PCI s/p stent thrombosis (reports last echo 30-40% EF) who presents for worsening dypsnea on exertion, orthopnea, generalized fatigue w/o chest pain.  Labs notable for troponin 0.04, elevated d-dimer, normal electrolytes. Clinical exam and CXR w/o volume overload.  BNP mildly elevated.  Pt given aspirin 325mg and CT PE w/o evidence of PE.  Cardiology consult, recommend repeat troponin to assess whether pt needs ICU.  Repeat troponin 6hr after initial shows stable troponin 0.04 and EKG stable on repeat, so pt to be admitted to tele for serial troponins, tele monitoring and echo.

## 2018-11-25 NOTE — H&P ADULT - PMH
Atherosclerosis of coronary artery  CAD (coronary artery disease)  Blood clot due to device, implant, or graft  was on blood thinners  Chronic systolic CHF (congestive heart failure)    CKD (chronic kidney disease) stage 2, GFR 60-89 ml/min    COPD, moderate    Diabetes    History of surgery to heart and great vessels, presenting hazards to health  x 4 in 2005  HLD (hyperlipidemia)    HTN (hypertension)    Hyperkalemia    Osteoarthritis    Status post percutaneous transluminal coronary angioplasty  in 2012  Type 2 diabetes mellitus  DM (diabetes mellitus), type 2

## 2018-11-25 NOTE — ED PROVIDER NOTE - PROGRESS NOTE DETAILS
Pt comfortable, well appearing, no change in sx.  Trop 0.04, unclear if downtrending from recent PCI or new cardiac event.  Otherwise reassuring labs.  Pt will need to be admitted for serial troponin, tele monitoring, likely cardiology consult and echo. d-dimer mildly elevated.  Pt has SOB w/o other signs of heart failure (no pulm edema on CXR, no lower extremity edema on exam) and with mildly elevated troponin of unclear significance.  Will proceed to CT PE. d-dimer mildly elevated.  Pt has SOB w/o other signs of heart failure (no pulm edema on CXR, no lower extremity edema on exam), and despite taking inc home dose lasix, and with mildly elevated troponin of unclear significance.  Will proceed to CT PE. Repeat EKG stable.

## 2018-11-25 NOTE — ED ADULT NURSE NOTE - OBJECTIVE STATEMENT
Pt with hx of MI s/p stent thrombosis 3 weeks ago presents c/o SOB for the fast few days. SOB worse on exertion. NO CP.

## 2018-11-25 NOTE — H&P ADULT - ATTENDING COMMENTS
Patient seen and examined independently. Agree with resident note with exceptions.    # Dyspnea on exertion, H/o CAD S/p CABG and PCI, S/p recent PCI to RCA, H/o Chronic Systolic CHF  -Monitor I/o, daily weight, restrict fluids.  - C/w cardiac monitoring  - EKG no acute changes  - trend cardiac enzymes  -Transthoracic Echocardiogram (11.26.18 @ 08:20) Left ventricular ejection fraction, by visual estimation, is 35 to 40%. Moderately decreased global left ventricular systolic function. Multiple left ventricular regional wall motion abnormalities exist. Spectral Doppler shows pseudonormal pattern of left ventricular myocardial filling (Grade II diastolic dysfunction).  - c/w ASA, Brilinta, statin, metoprolol  - C/w lasix  - Cardiology consulted    #H/o DM type 2  - C/w Insulin pump  - monitor FS    #Depression  - c/w cymbalta    #GI/ DVT prophylaxis

## 2018-11-25 NOTE — ED PROVIDER NOTE - OBJECTIVE STATEMENT
This is a 63yM CAD s/p CABG complicated by stent thrombosis ~4-6wk ago (despite plavix, now on brilinta) who presents for worsening JUNG over the past few days, now w/ 3 pillow orthopnea. No chest pain.  Has been somewhat physically active, walking around his house, preparing to return to work next week.  No leg edema.  Has been compliant with his medications. This is a 63yM CAD s/p CABG complicated by stent thrombosis while on plavix, now on brilinta, who presents for worsening JUNG over the past few days, now w/ 3 pillow orthopnea. No chest pain.  Has been somewhat physically active, walking around his house, preparing to return to work next week.  No leg edema.  Has been compliant with his medications.

## 2018-11-25 NOTE — ED PROVIDER NOTE - PHYSICAL EXAMINATION
CONSTITUTIONAL: well developed; well nourished; well appearing in no acute distress  HEAD: normocephalic; atraumatic  EYES: no conjunctival injection, no scleral icterus  ENT: no nasal discharge; airway clear.  NECK: supple; non tender. + full passive ROM in all directions  CARD: S1, S2 normal; no murmurs, gallops, or rubs. Regular rate and rhythm  RESP: no wheezes, rales or rhonchi. b/l breath sounds, distant, diminished b/l despite good inspiratory effort  ABD: soft; non-distended; non-tender. No rebound, no guarding, no pulsatile abdominal mass  EXT: moving all extremities spontaneously, normal ROM. No clubbing, cyanosis or edema  SKIN: warm and dry, no lesions noted  NEURO: alert, oriented, CN II-XII grossly intact, motor and sensory grossly intact, speech nonslurred, no focal deficits. GCS 15  PSYCH: calm, cooperative, appropriate, good eye contact, logical thought process, no apparent danger to self or others

## 2018-11-25 NOTE — H&P ADULT - NSHPPHYSICALEXAM_GEN_ALL_CORE
PHYSICAL EXAM:  GEN: No acute distress.   HEENT: EOMI. Pupils equals and reactive to light bilaterally.   LUNGS: Clear to auscultation bilaterally. No wheeze/rales/crackles.   HEART: S1/S2 present. RRR. No murmur/gallops/rubs.   ABD: Soft, non-tender, non-distended. Bowel sounds present  EXT: No pitting edema.   NEURO: AAOX3. Non focal. PHYSICAL EXAM:  GEN: No acute distress.   HEENT: EOMI. Pupils equals and reactive to light bilaterally.   LUNGS: Clear to auscultation bilaterally. No wheeze/rales/crackles.   HEART: S1/S2 present. RRR. No murmur/gallops/rubs.   ABD: Soft, non-tender, non-distended. Bowel sounds present  EXT: 1+ pitting edema.   NEURO: AAOX3. Non focal.

## 2018-11-25 NOTE — H&P ADULT - ASSESSMENT
64 yo M CAD s/p CABG and PCI s/p stent thrombosis while on Plavix now on Brilinta (reports last echo 35-40% LVEF) who presents for few days hx of worsening dyspnea on exertion, orthopnea, generalized fatigue w/o chest pain.      Dyspnea and Orthopnea in the setting of hx of HFrEF  - CE 0.04 --> 0.04  - CTA: negative for PE. CXR: unremarkable.   - EKG: unchanged from previous.   - Echo 10/2018: LVEF 35-40%, G1DD.  - c/w lasix, ASA, statin, lopressor  - will repeat Echo  - monitor daily weight, I & O, Fluid intake < 1.5L per day  - f/u Cardiology  - Admit to Tele.     CAD s/p CAGB s/p thrombosed stent  - c/w ASA, Brilinta, Statin  - Cardiology consulted.     DM   - on insulin pump  - monitor FS    CKD  - stable   - monitor BMP    HTN  - c/w lopressor, lasix     HLD  - c/w statin     Depression  - c/w Cymbalta and Paxil     COPD   - stable        DVT ppx: Lovenox SC  GI ppx: Protonix   Diet: DASH diet (fluid restriction < 1.5L per day)  Activity: ambulates as tolerated. 64 yo M CAD s/p CABG and PCI s/p stent thrombosis now on Brilinta (failed Plavix), HFrEF (reports last echo 35-40% LVEF), HTN, HLD, IDDM (on insulin pump), and Depression who presents for 3d hx of SOB when lying flat.       Dyspnea and Orthopnea in the setting of hx of HFrEF  - CE 0.04 --> 0.04  - CTA: negative for PE. CXR: unremarkable.   - EKG: unchanged from previous.   - Echo 10/2018: LVEF 35-40%, G1DD.  - c/w lasix, ASA, statin, metoprolol   - will repeat Echo  - monitor daily weight, I & O, Fluid intake < 1.5L per day  - f/u Cardiology (Dr. Lopez)  - Admit to Tele.     CAD s/p CAGB s/p thrombosed stent  - c/w ASA, Brilinta, Statin  - Cardiology consulted.     DM   - on insulin pump  - monitor FS    HTN  - c/w metoprolol, lasix     HLD  - c/w statin     Depression  - c/w Cymbalta        DVT ppx: Lovenox SC  GI ppx: Protonix   Diet: DASH diet (fluid restriction < 1.5L per day)  Activity: ambulates as tolerated.

## 2018-11-25 NOTE — H&P ADULT - NSHPREVIEWOFSYSTEMS_GEN_ALL_CORE
REVIEW OF SYSTEMS:    CONSTITUTIONAL: No weakness, fevers or chills  EYES/ENT: No visual changes;  No vertigo or throat pain   NECK: No pain or stiffness  RESPIRATORY: No cough, wheezing, hemoptysis; No shortness of breath  CARDIOVASCULAR: No chest pain or palpitations  GASTROINTESTINAL: No abdominal or epigastric pain. No nausea, vomiting, or hematemesis; No diarrhea or constipation. No melena or hematochezia.  GENITOURINARY: No dysuria, frequency or hematuria  NEUROLOGICAL: No numbness or weakness  SKIN: No itching, rashes REVIEW OF SYSTEMS:    CONSTITUTIONAL: No weakness, fevers or chills  EYES/ENT: No visual changes;  No vertigo or throat pain   NECK: No pain or stiffness.   RESPIRATORY: No cough, wheezing, hemoptysis; SOB when lying flat.   CARDIOVASCULAR: No chest pain or palpitations  GASTROINTESTINAL: No abdominal or epigastric pain. No nausea, vomiting, or hematemesis; No diarrhea or constipation. No melena or hematochezia.  GENITOURINARY: No dysuria, frequency or hematuria  NEUROLOGICAL: No numbness or weakness  SKIN: No itching, rashes

## 2018-11-25 NOTE — H&P ADULT - NSHPLABSRESULTS_GEN_ALL_CORE
11.2   11.15 )-----------( 276      ( 25 Nov 2018 11:55 )             35.6             11-25    140  |  100  |  36<H>  ----------------------------<  302<H>  4.9   |  23  |  1.3    Ca    8.9      25 Nov 2018 11:55  Mg     2.3     11-25    PT/INR - ( 25 Nov 2018 11:55 )   PT: 12.30 sec;   INR: 1.07 ratio        CARDIAC MARKERS ( 25 Nov 2018 19:25 )  x     / 0.04 ng/mL / x     / x     / x      CARDIAC MARKERS ( 25 Nov 2018 11:55 )  x     / 0.04 ng/mL / x     / x     / x                Serum Pro-Brain Natriuretic Peptide: 871 pg/mL (11-25-18 @ 11:55)

## 2018-11-26 ENCOUNTER — TRANSCRIPTION ENCOUNTER (OUTPATIENT)
Age: 63
End: 2018-11-26

## 2018-11-26 LAB
GLUCOSE BLDC GLUCOMTR-MCNC: 173 MG/DL — HIGH (ref 70–99)
GLUCOSE BLDC GLUCOMTR-MCNC: 175 MG/DL — HIGH (ref 70–99)

## 2018-11-26 RX ORDER — CHLORHEXIDINE GLUCONATE 213 G/1000ML
1 SOLUTION TOPICAL
Qty: 0 | Refills: 0 | Status: DISCONTINUED | OUTPATIENT
Start: 2018-11-26 | End: 2018-11-27

## 2018-11-26 RX ADMIN — DULOXETINE HYDROCHLORIDE 90 MILLIGRAM(S): 30 CAPSULE, DELAYED RELEASE ORAL at 12:50

## 2018-11-26 RX ADMIN — ENOXAPARIN SODIUM 40 MILLIGRAM(S): 100 INJECTION SUBCUTANEOUS at 00:06

## 2018-11-26 RX ADMIN — PANTOPRAZOLE SODIUM 40 MILLIGRAM(S): 20 TABLET, DELAYED RELEASE ORAL at 08:36

## 2018-11-26 RX ADMIN — Medication 40 MILLIGRAM(S): at 05:36

## 2018-11-26 RX ADMIN — Medication 100 MILLIGRAM(S): at 05:48

## 2018-11-26 RX ADMIN — Medication 81 MILLIGRAM(S): at 12:50

## 2018-11-26 RX ADMIN — ATORVASTATIN CALCIUM 80 MILLIGRAM(S): 80 TABLET, FILM COATED ORAL at 22:27

## 2018-11-26 RX ADMIN — TICAGRELOR 90 MILLIGRAM(S): 90 TABLET ORAL at 12:48

## 2018-11-26 RX ADMIN — TICAGRELOR 90 MILLIGRAM(S): 90 TABLET ORAL at 22:27

## 2018-11-26 RX ADMIN — Medication 4 GRAM(S): at 22:30

## 2018-11-26 NOTE — CONSULT NOTE ADULT - ASSESSMENT
64 yo M CAD s/p CABG and PCI s/p stent thrombosis now on Brilinta (failed Plavix), HFrEF (reports last echo 35-40% LVEF), HTN, HLD, IDDM, and Depression who presents for 3d hx of SOB when lying flat.    Orthopnea  -doubt CHF  -very mild troponin elevation  -CXR clear, no LE edema  -repeat ECHO showed stable EF  -cont PO Lasix  -cont all other home cardiac meds  -out pt cardiology f/u

## 2018-11-26 NOTE — DISCHARGE NOTE ADULT - CARE PLAN
Principal Discharge DX:	Shortness of breath  Goal:	Prevent further worsening  Assessment and plan of treatment:	Your SOB likely due to ??heart failure, please follow up with your cardiologist in 1-2 weeks and take meds as prescribed  Secondary Diagnosis:	Chronic systolic CHF (congestive heart failure)  Goal:	Prevent exacerbations  Assessment and plan of treatment:	Take low salt diet, please follow up with your cardiologist in 1-2 weeks and take meds as prescribed  If you experience more SOB, leg swelling take extra dose of lasix if didn't improve call 911. Principal Discharge DX:	Shortness of breath  Goal:	Prevent further worsening  Assessment and plan of treatment:	Your SOB likely due to a side effect of Brilinta, please take your Brilinta second dose tonight and begin taking Effient 10 mg daily starting tomorrow. Follow up with your cardiologist in 1-2 weeks and take the rest of the medications as prescribed.  Secondary Diagnosis:	Chronic systolic CHF (congestive heart failure)  Goal:	Prevent exacerbations  Assessment and plan of treatment:	Take low salt diet, please follow up with your cardiologist in 1-2 weeks and take meds as prescribed  If you experience more SOB, leg swelling take extra dose of Lasix if unimproved call 911.

## 2018-11-26 NOTE — DISCHARGE NOTE ADULT - MEDICATION SUMMARY - MEDICATIONS TO STOP TAKING
I will STOP taking the medications listed below when I get home from the hospital:  None I will STOP taking the medications listed below when I get home from the hospital:    ticagrelor 90 mg oral tablet  -- 1 tab(s) by mouth 2 times a day    metoprolol tartrate 25 mg oral tablet  -- 1 tab(s) by mouth 2 times a day

## 2018-11-26 NOTE — ED ADULT NURSE REASSESSMENT NOTE - NS ED NURSE REASSESS COMMENT FT1
bs 123
patient finger stick prior to breakfast was 173, patient has his own insulin pump and states he gave himself 15 units of Fiasp insulin
a&o x3, lsc, abd soft, nt/nd, st on monitor, awaits bed assignment - will continue to monitor

## 2018-11-26 NOTE — DISCHARGE NOTE ADULT - PATIENT PORTAL LINK FT
You can access the [facility] Patient Portal, offered by [facility], by registering with the following website: [email or web address]

## 2018-11-26 NOTE — DISCHARGE NOTE ADULT - HOSPITAL COURSE
64 yo M CAD s/p CABG and PCI s/p stent thrombosis now on Brilinta (failed Plavix), HFrEF (reports last echo 35-40% LVEF), HTN, HLD, IDDM (on insulin pump), and Depression who presents for 3d hx of SOB when lying flat.       #)Dyspnea and Orthopnea, CTA: negative for PE, CXR: unremarkable,EKG: unchanged from previous  ??Heart failure exacerbation no clinical evidence of CHF, no events on tele  Trops stable 0.04, Echo 10/2018: LVEF 35-40%, G1DD.  -Repeat echo today Ef 35-40%, Grade II DD  -c/w lasix (Home dose) ASA, statin, metoprolol   -Cardiology att to follow    #)CAD s/p CAGB s/p thrombosed stent  - c/w ASA, Brilinta, Statin    #)DM   - on insulin pump    #)HTN  - c/w metoprolol, lasix     #)HLD  - c/w statin     #)Depression  - c/w Cymbalta 64 yo M CAD s/p CABG and PCI s/p stent thrombosis now on Brilinta (failed Plavix), HFrEF (reports last echo 35-40% LVEF), HTN, HLD, IDDM (on insulin pump), and Depression who presents for 3d hx of SOB when lying flat.       #)Dyspnea and Orthopnea, CTA: negative for PE, CXR: unremarkable,EKG: unchanged from previous  ??Heart failure exacerbation no clinical evidence of CHF, no events on tele  Trops stable 0.04, Echo 10/2018: LVEF 35-40%, G1DD.  -Repeat echo today Ef 35-40%, Grade II DD  -c/w lasix (Home dose) ASA, statin, metoprolol   -Likely 2/2 adverse effect of Brilinta - will switch to Effient on d/c (has taken it before)    #)CAD s/p CAGB s/p thrombosed stent  - c/w ASA, Brilinta, Statin    #)DM   - on insulin pump    #)HTN  - c/w metoprolol, lasix     #)HLD  - c/w statin     #)Depression  - c/w Cymbalta

## 2018-11-26 NOTE — DISCHARGE NOTE ADULT - CARE PROVIDER_API CALL
Mihir Lopez), Cardiovascular Disease; Internal Medicine; Interventional Cardiology  [address] [place] [address]  Phone: [phone]  Fax: [phone]

## 2018-11-26 NOTE — CONSULT NOTE ADULT - ATTENDING COMMENTS
Patient seen, case d/w cardiology fellow and medical resident. 63-yo male, s/p recent PCI of RCA (subacute stent thrombosis on Plavix). Patient c/o dyspnea, likely due to Brilinta. He has tolerated Effient in the past, would switch to it again now (take Brilinta 2nd dose tonight, start Effient in Am).  D/c patient home as long as Effient prescription can be filled today at a local pharmacy.  F/u with his cardiologist in 1-2 weeks.

## 2018-11-26 NOTE — PROGRESS NOTE ADULT - ASSESSMENT
62 yo M CAD s/p CABG and PCI s/p stent thrombosis now on Brilinta (failed Plavix), HFrEF (reports last echo 35-40% LVEF), HTN, HLD, IDDM (on insulin pump), and Depression who presents for 3d hx of SOB when lying flat.       #)Dyspnea and Orthopnea Likely due to HfREF (- CTA: negative for PE. CXR: unremarkable. )  - EKG: unchanged from previous.  Trops stable 0.04  - Echo 10/2018: LVEF 35-40%, G1DD.  -Repaet echo today Ef 35-40%  -c/w lasix, ASA, statin, metoprolol   -Cardiology att to follow    #)CAD s/p CAGB s/p thrombosed stent  - c/w ASA, Brilinta, Statin    #)DM   - on insulin pump  - monitor FS    #)HTN  - c/w metoprolol, lasix     #)HLD  - c/w statin     #)Depression  - c/w Cymbalta    #)DVT ppx: Lovenox SC  #)GI ppx: Protonix   #)Diet: DASH diet (fluid restriction < 1.5L per day)  #)Activity: ambulates as tolerated.   #)Dispo: From home  #)Code: Full 64 yo M CAD s/p CABG and PCI s/p stent thrombosis now on Brilinta (failed Plavix), HFrEF (reports last echo 35-40% LVEF), HTN, HLD, IDDM (on insulin pump), and Depression who presents for 3d hx of SOB when lying flat.       #)Dyspnea and Orthopnea, CTA: negative for PE, CXR: unremarkable,EKG: unchanged from previous  ??Heart failure exacerbation no clinical evidence of CHF, no events on tele  Trops stable 0.04, Echo 10/2018: LVEF 35-40%, G1DD.  -Repeat echo today Ef 35-40%, Grade II DD  -c/w lasix (Home dose) ASA, statin, metoprolol   -Cardiology att to follow    #)CAD s/p CAGB s/p thrombosed stent  - c/w ASA, Brilinta, Statin    #)DM   - on insulin pump  - monitor FS    #)HTN  - c/w metoprolol, lasix     #)HLD  - c/w statin     #)Depression  - c/w Cymbalta    #)DVT ppx: Lovenox SC  #)GI ppx: Protonix   #)Diet: DASH diet  #)Activity: ambulates as tolerated.   #)Dispo: From home  #)Code: Full

## 2018-11-26 NOTE — CONSULT NOTE ADULT - SUBJECTIVE AND OBJECTIVE BOX
Patient is a 63y old  Male who presents with a chief complaint of Dyspnea on exertion (26 Nov 2018 15:06)    HPI:  64 yo M CAD s/p CABG and PCI s/p stent thrombosis now on Brilinta (failed Plavix), HFrEF (reports last echo 35-40% LVEF), HTN, HLD, IDDM, and Depression who presents for 3d hx of SOB when lying flat. Patient reports that he has to use 3 pillows to sleep. Denied SOB when walking. Has been somewhat physically active, walking around his house, preparing to return to work next week. No leg edema. Has been compliant with all his medications.  He recently had cardiac cath on Oct 10 and he was found to have thrombosis of stent in distal RCA  that was placed 36h prior at [facility], s/p PCI to RCA again. Patient was switched from Plavix to Brilinta that time and discharge home. Pt is currently asymptomatic.       ROS:  All other systems reviewed and are negative    PAST MEDICAL & SURGICAL HISTORY  Diabetes  Blood clot due to device, implant, or graft: was on blood thinners  CKD (chronic kidney disease) stage 2, GFR 60-89 ml/min  COPD, moderate  Hyperkalemia  HLD (hyperlipidemia)  Chronic systolic CHF (congestive heart failure)  Osteoarthritis  HTN (hypertension)  Type 2 diabetes mellitus: DM (diabetes mellitus), type 2  History of surgery to heart and great vessels, presenting hazards to health: x 4 in 2005  Atherosclerosis of coronary artery: CAD (coronary artery disease)  Status post percutaneous transluminal coronary angioplasty: in 2012  Stented coronary artery  Other postprocedural status: Fixation hardware in foot      FAMILY HISTORY:  FAMILY HISTORY:  Family history of heart disease (Mother)      SOCIAL HISTORY:  [-]smoker  [-]Alcohol  [-]Drug    ALLERGIES:  ACE inhibitors (Hives)  enalapril (Hives)      MEDICATIONS:  MEDICATIONS  (STANDING):  aspirin enteric coated 81 milliGRAM(s) Oral daily  atorvastatin 80 milliGRAM(s) Oral at bedtime  chlorhexidine 4% Liquid 1 Application(s) Topical <User Schedule>  DULoxetine 90 milliGRAM(s) Oral daily  enoxaparin Injectable 40 milliGRAM(s) SubCutaneous daily  furosemide    Tablet 40 milliGRAM(s) Oral daily  metoprolol succinate  milliGRAM(s) Oral daily  omega-3-Acid Ethyl Esters 4 Gram(s) Oral daily  pantoprazole    Tablet 40 milliGRAM(s) Oral before breakfast  ticagrelor 90 milliGRAM(s) Oral every 12 hours    MEDICATIONS  (PRN):      HOME MEDICATIONS:  Home Medications:  Cymbalta: 90 milligram(s) orally once a day (08 Oct 2018 09:18)  Lasix 40 mg oral tablet: 1 tab(s) orally once a day (08 Oct 2018 09:18)  Metoprolol Succinate  mg oral tablet, extended release: 1 tab(s) orally once a day (25 Nov 2018 22:59)  Omega-3 oral capsule: 1 tab(s) orally once a day (08 Oct 2018 09:18)      VITALS:   T(F): 98.2 (11-26 @ 10:06), Max: 98.2 (11-26 @ 10:06)  HR: 70 (11-26 @ 10:06) (62 - 75)  BP: 111/57 (11-26 @ 10:06) (94/55 - 111/57)  BP(mean): --  RR: 18 (11-26 @ 10:06) (18 - 20)  SpO2: 97% (11-26 @ 10:06) (95% - 100%)    I&O's Summary      PHYSICAL EXAM:  GEN: Alert and oriented X 3, No acute distress  HEENT: no pallor  NECK: Supple, no JVD  LUNGS: Clear to auscultation bilaterally  CARDIOVASCULAR: S1/S2 regular, no murmurs or rubs  ABD: Soft, BS+  EXT: No Lower extremity edema/cyanosis  NEURO: Non focal  SKIN: Intact    LABS:                        11.2   11.15 )-----------( 276      ( 25 Nov 2018 11:55 )             35.6     11-25    140  |  100  |  36<H>  ----------------------------<  302<H>  4.9   |  23  |  1.3    Ca    8.9      25 Nov 2018 11:55  Mg     2.3     11-25      PT/INR - ( 25 Nov 2018 11:55 )   PT: 12.30 sec;   INR: 1.07 ratio           Troponin T, Serum: 0.04 ng/mL <HH> (11-25-18 @ 19:25)    CARDIAC MARKERS ( 25 Nov 2018 19:25 )  x     / 0.04 ng/mL / x     / x     / x      CARDIAC MARKERS ( 25 Nov 2018 11:55 )  x     / 0.04 ng/mL / x     / x     / x            Troponin trend:    Serum Pro-Brain Natriuretic Peptide: 871 pg/mL (11-25-18 @ 11:55)    10-08 Chol 190  HDL 40 Trig 126  Hemoglobin A1C   Thyroid      RADIOLOGY:  -CXR:  < from: Xray Chest 2 Views PA/Lat (11.25.18 @ 12:49) >    Impression:      No radiographic evidence of acute cardiopulmonary disease.    < end of copied text >    < from: CT Angio Chest w/ IV Cont (11.25.18 @ 17:08) >      IMPRESSION:     No CT evidence for acute pulmonary embolus.    < end of copied text >    -TTE:  < from: Transthoracic Echocardiogram (11.26.18 @ 08:20) >  Summary:   1. Left ventricular ejection fraction, by visual estimation, is 35 to   40%.   2. Moderately decreased global left ventricular systolic function.   3. Multiple left ventricular regional wall motion abnormalities exist.   See wall motion findings.   4. Spectral Doppler shows pseudonormal pattern of left ventricular   myocardial filling (Grade II diastolic dysfunction).   5. Mild mitral regurgitation.    < end of copied text >  < from: Transthoracic Echocardiogram (10.10.18 @ 08:56) >  Summary:   1. Left ventricular ejection fraction, by visual estimation, is 35 to   40%.   2. Entire inferior wall, mid and apical anterior wall, and basal and mid   inferolateral wall are abnormal as described above.   3. Mildly increased LV wall thickness.   4. Spectral Doppler shows impaired relaxation pattern of left   ventricular myocardial filling (Grade I diastolic dysfunction).   5. Mild tricuspid regurgitation.    < end of copied text >        -CATHETERIZATION:  < from: Cardiac Cath Lab - Adult (10.12.18 @ 12:27) >  1ST LESION INTERVENTIONS: A successful stent with balloon angioplasty was    performed on the 95 %lesion in the 1st obtuse marginal. Following    intervention there was an excellent angiographic appearance with a 0 %    residual stenosis.      < end of copied text >    < from: Cardiac Cath Lab - Adult (10.10.18 @ 04:36) >    1ST LESION INTERVENTIONS: A successful balloon angioplasty with stent and    balloon angioplasty was performed on the 100 % lesion in the distal RCA.    Following intervention there was an excellent angiographic appearance with    a 0 % residual stenosis.    < end of copied text >  < from: Cardiac Cath Lab - Adult (10.10.18 @ 04:36) >  CORONARY CIRCULATION: There was 2-vessel coronary artery disease (RCA and    circumflex). Left main: Mild diffuse disease. Proximal LAD: There was a    tubular 95 % stenosis at the site of a prior stent. The lesion was heavily    calcified. Distal LAD: Angiography showed no evidence of disease. The    artery was supplied by a patent bypass graft. Circumflex: The vessel was    medium sized. Angiography showed mild atherosclerosis with no flow    limiting lesions. 1st obtuse marginal: There was a discrete 90 % stenosis    at a site withno prior intervention. There was ARIANA grade 3 flow through    the vessel (brisk flow). RCA: The vessel was large sized (dominant). Mid    RCA: 40% stenosis at the takeoff of RVM. Distal RCA: There was a tubular    100 % stenosis at the site of a prior stent. The lesion was associated    with a moderate filling defect consistent with thrombus. There was ARIANA    grade 0 flow through the vessel (no flow). This lesion is a likely culprit    for the patient's recent myocardial infarction. An intervention was    performed. Graft to the LAD: The graft was a LIMA to apical segment.    < end of copied text >      ECG:  < from: 12 Lead ECG (11.25.18 @ 16:17) >    Diagnosis Line Normal sinus rhythm  Left axis deviation  Left bundle branch block  Abnormal ECG    < end of copied text >    TELEMETRY EVENTS:  none

## 2018-11-26 NOTE — PROGRESS NOTE ADULT - SUBJECTIVE AND OBJECTIVE BOX
SUBJECTIVE:    Patient is a 63y old Male who presents with a chief complaint of Dyspnea on exertion (25 Nov 2018 21:27)    Currently admitted to medicine with the primary diagnosis of Shortness of breath     Today is hospital day 1d. Feeling better currently denies any chest pain.      PAST MEDICAL & SURGICAL HISTORY  Diabetes  Blood clot due to device, implant, or graft: was on blood thinners  CKD (chronic kidney disease) stage 2, GFR 60-89 ml/min  COPD, moderate  Hyperkalemia  HLD (hyperlipidemia)  Chronic systolic CHF (congestive heart failure)  Osteoarthritis  HTN (hypertension)  Type 2 diabetes mellitus: DM (diabetes mellitus), type 2  History of surgery to heart and great vessels, presenting hazards to health: x 4 in 2005  Atherosclerosis of coronary artery: CAD (coronary artery disease)  Status post percutaneous transluminal coronary angioplasty: in 2012  Stented coronary artery  Other postprocedural status: Fixation hardware in foot    SOCIAL HISTORY:  Negative for smoking/alcohol/drug use.     ALLERGIES:  ACE inhibitors (Hives)  enalapril (Hives)    MEDICATIONS:  STANDING MEDICATIONS  aspirin enteric coated 81 milliGRAM(s) Oral daily  atorvastatin 80 milliGRAM(s) Oral at bedtime  chlorhexidine 4% Liquid 1 Application(s) Topical <User Schedule>  DULoxetine 90 milliGRAM(s) Oral daily  enoxaparin Injectable 40 milliGRAM(s) SubCutaneous daily  furosemide    Tablet 40 milliGRAM(s) Oral daily  metoprolol succinate  milliGRAM(s) Oral daily  omega-3-Acid Ethyl Esters 4 Gram(s) Oral daily  pantoprazole    Tablet 40 milliGRAM(s) Oral before breakfast  ticagrelor 90 milliGRAM(s) Oral every 12 hours    PRN MEDICATIONS    VITALS:   T(F): 98.2  HR: 70  BP: 111/57  RR: 18  SpO2: 97%    LABS:                        11.2   11.15 )-----------( 276      ( 25 Nov 2018 11:55 )             35.6     11-25    140  |  100  |  36<H>  ----------------------------<  302<H>  4.9   |  23  |  1.3    Ca    8.9      25 Nov 2018 11:55  Mg     2.3     11-25      PT/INR - ( 25 Nov 2018 11:55 )   PT: 12.30 sec;   INR: 1.07 ratio               Troponin T, Serum: 0.04 ng/mL <HH> (11-25-18 @ 19:25)      CARDIAC MARKERS ( 25 Nov 2018 19:25 )  x     / 0.04 ng/mL / x     / x     / x      CARDIAC MARKERS ( 25 Nov 2018 11:55 )  x     / 0.04 ng/mL / x     / x     / x          RADIOLOGY:    PHYSICAL EXAM:  GEN: No acute distress  LUNGS: Clear to auscultation bilaterally   HEART: S1/S2 present. RRR.   ABD: Soft, non-tender, non-distended. Bowel sounds present  EXT:1+ pitting edema  NEURO: AAOX3 SUBJECTIVE:    Patient is a 63y old Male who presents with a chief complaint of Dyspnea on exertion (25 Nov 2018 21:27)    Currently admitted to medicine with the primary diagnosis of Shortness of breath     Today is hospital day 1d. Feeling better currently denies any chest pain, SOB , palpitations    PAST MEDICAL & SURGICAL HISTORY  Diabetes  Blood clot due to device, implant, or graft: was on blood thinners  CKD (chronic kidney disease) stage 2, GFR 60-89 ml/min  COPD, moderate  Hyperkalemia  HLD (hyperlipidemia)  Chronic systolic CHF (congestive heart failure)  Osteoarthritis  HTN (hypertension)  Type 2 diabetes mellitus: DM (diabetes mellitus), type 2  History of surgery to heart and great vessels, presenting hazards to health: x 4 in 2005  Atherosclerosis of coronary artery: CAD (coronary artery disease)  Status post percutaneous transluminal coronary angioplasty: in 2012  Stented coronary artery  Other postprocedural status: Fixation hardware in foot    SOCIAL HISTORY:  Negative for smoking/alcohol/drug use.     ALLERGIES:  ACE inhibitors (Hives)  enalapril (Hives)    MEDICATIONS:  STANDING MEDICATIONS  aspirin enteric coated 81 milliGRAM(s) Oral daily  atorvastatin 80 milliGRAM(s) Oral at bedtime  chlorhexidine 4% Liquid 1 Application(s) Topical <User Schedule>  DULoxetine 90 milliGRAM(s) Oral daily  enoxaparin Injectable 40 milliGRAM(s) SubCutaneous daily  furosemide    Tablet 40 milliGRAM(s) Oral daily  metoprolol succinate  milliGRAM(s) Oral daily  omega-3-Acid Ethyl Esters 4 Gram(s) Oral daily  pantoprazole    Tablet 40 milliGRAM(s) Oral before breakfast  ticagrelor 90 milliGRAM(s) Oral every 12 hours    PRN MEDICATIONS    VITALS:   T(F): 98.2  HR: 70  BP: 111/57  RR: 18  SpO2: 97%    LABS:                        11.2   11.15 )-----------( 276      ( 25 Nov 2018 11:55 )             35.6     11-25    140  |  100  |  36<H>  ----------------------------<  302<H>  4.9   |  23  |  1.3    Ca    8.9      25 Nov 2018 11:55  Mg     2.3     11-25      PT/INR - ( 25 Nov 2018 11:55 )   PT: 12.30 sec;   INR: 1.07 ratio               Troponin T, Serum: 0.04 ng/mL <HH> (11-25-18 @ 19:25)      CARDIAC MARKERS ( 25 Nov 2018 19:25 )  x     / 0.04 ng/mL / x     / x     / x      CARDIAC MARKERS ( 25 Nov 2018 11:55 )  x     / 0.04 ng/mL / x     / x     / x          RADIOLOGY:    PHYSICAL EXAM:  GEN: No acute distress  LUNGS: Clear to auscultation bilaterally   HEART: S1/S2 present. RRR.   ABD: Soft, non-tender, non-distended. Bowel sounds present  EXT: No edema  NEURO: AAOX3

## 2018-11-26 NOTE — DISCHARGE NOTE ADULT - MEDICATION SUMMARY - MEDICATIONS TO TAKE
I will START or STAY ON the medications listed below when I get home from the hospital:    aspirin 81 mg oral delayed release tablet  -- 1 tab(s) by mouth once a day  -- Indication: For CAD    Cymbalta  -- 90 milligram(s) by mouth once a day  -- Indication: For Depression    Zetia 10 mg oral tablet  -- 1 tab(s) by mouth once a day  -- Indication: For Hyperlipidemia    Crestor 40 mg oral tablet  -- 1 tab(s) by mouth once a day (at bedtime)   -- Do not take dairy products, antacids, or iron preparations within one hour of this medication.  Do not take this drug if you are pregnant.  It is very important that you take or use this exactly as directed.  Do not skip doses or discontinue unless directed by your doctor.    -- Indication: For Hyperlipidemia    ticagrelor 90 mg oral tablet  -- 1 tab(s) by mouth 2 times a day  -- Indication: For CAD    Metoprolol Succinate  mg oral tablet, extended release  -- 1 tab(s) by mouth once a day  -- Indication: For Chronic systolic CHF (congestive heart failure)    Lasix 40 mg oral tablet  -- 1 tab(s) by mouth once a day  -- Indication: For Chronic systolic CHF (congestive heart failure)    Omega-3 oral capsule  -- 1 tab(s) by mouth once a day  -- Indication: For Supplement I will START or STAY ON the medications listed below when I get home from the hospital:    aspirin 81 mg oral delayed release tablet  -- 1 tab(s) by mouth once a day  -- Indication: For CAD    Cymbalta  -- 90 milligram(s) by mouth once a day  -- Indication: For Depression    Zetia 10 mg oral tablet  -- 1 tab(s) by mouth once a day  -- Indication: For Hyperlipidemia    Crestor 40 mg oral tablet  -- 1 tab(s) by mouth once a day (at bedtime)   -- Do not take dairy products, antacids, or iron preparations within one hour of this medication.  Do not take this drug if you are pregnant.  It is very important that you take or use this exactly as directed.  Do not skip doses or discontinue unless directed by your doctor.    -- Indication: For Hyperlipidemia    Effient 10 mg oral tablet  -- 1 tab(s) by mouth once a day   -- It is very important that you take or use this exactly as directed.  Do not skip doses or discontinue unless directed by your doctor.  Obtain medical advice before taking any non-prescription drugs as some may affect the action of this medication.  Swallow whole.  Do not crush.    -- Indication: For CAD    Metoprolol Succinate  mg oral tablet, extended release  -- 1 tab(s) by mouth once a day  -- Indication: For Chronic systolic CHF (congestive heart failure)    Lasix 40 mg oral tablet  -- 1 tab(s) by mouth once a day  -- Indication: For Chronic systolic CHF (congestive heart failure)    Omega-3 oral capsule  -- 1 tab(s) by mouth once a day  -- Indication: For Supplement

## 2018-11-27 VITALS
TEMPERATURE: 97 F | SYSTOLIC BLOOD PRESSURE: 111 MMHG | DIASTOLIC BLOOD PRESSURE: 59 MMHG | HEART RATE: 65 BPM | RESPIRATION RATE: 20 BRPM

## 2018-11-27 LAB
ANION GAP SERPL CALC-SCNC: 14 MMOL/L — SIGNIFICANT CHANGE UP (ref 7–14)
BASOPHILS # BLD AUTO: 0.05 K/UL — SIGNIFICANT CHANGE UP (ref 0–0.2)
BASOPHILS NFR BLD AUTO: 0.5 % — SIGNIFICANT CHANGE UP (ref 0–1)
BUN SERPL-MCNC: 28 MG/DL — HIGH (ref 10–20)
CALCIUM SERPL-MCNC: 9.4 MG/DL — SIGNIFICANT CHANGE UP (ref 8.5–10.1)
CHLORIDE SERPL-SCNC: 99 MMOL/L — SIGNIFICANT CHANGE UP (ref 98–110)
CO2 SERPL-SCNC: 27 MMOL/L — SIGNIFICANT CHANGE UP (ref 17–32)
CREAT SERPL-MCNC: 1.2 MG/DL — SIGNIFICANT CHANGE UP (ref 0.7–1.5)
EOSINOPHIL # BLD AUTO: 0.34 K/UL — SIGNIFICANT CHANGE UP (ref 0–0.7)
EOSINOPHIL NFR BLD AUTO: 3.2 % — SIGNIFICANT CHANGE UP (ref 0–8)
GLUCOSE SERPL-MCNC: 197 MG/DL — HIGH (ref 70–99)
HCT VFR BLD CALC: 39 % — LOW (ref 42–52)
HGB BLD-MCNC: 12.3 G/DL — LOW (ref 14–18)
IMM GRANULOCYTES NFR BLD AUTO: 0.5 % — HIGH (ref 0.1–0.3)
LYMPHOCYTES # BLD AUTO: 1.55 K/UL — SIGNIFICANT CHANGE UP (ref 1.2–3.4)
LYMPHOCYTES # BLD AUTO: 14.4 % — LOW (ref 20.5–51.1)
MCHC RBC-ENTMCNC: 25.7 PG — LOW (ref 27–31)
MCHC RBC-ENTMCNC: 31.5 G/DL — LOW (ref 32–37)
MCV RBC AUTO: 81.4 FL — SIGNIFICANT CHANGE UP (ref 80–94)
MONOCYTES # BLD AUTO: 0.97 K/UL — HIGH (ref 0.1–0.6)
MONOCYTES NFR BLD AUTO: 9 % — SIGNIFICANT CHANGE UP (ref 1.7–9.3)
NEUTROPHILS # BLD AUTO: 7.8 K/UL — HIGH (ref 1.4–6.5)
NEUTROPHILS NFR BLD AUTO: 72.4 % — SIGNIFICANT CHANGE UP (ref 42.2–75.2)
PLATELET # BLD AUTO: 311 K/UL — SIGNIFICANT CHANGE UP (ref 130–400)
POTASSIUM SERPL-MCNC: 5.3 MMOL/L — HIGH (ref 3.5–5)
POTASSIUM SERPL-SCNC: 5.3 MMOL/L — HIGH (ref 3.5–5)
RBC # BLD: 4.79 M/UL — SIGNIFICANT CHANGE UP (ref 4.7–6.1)
RBC # FLD: 15.4 % — HIGH (ref 11.5–14.5)
SODIUM SERPL-SCNC: 140 MMOL/L — SIGNIFICANT CHANGE UP (ref 135–146)
WBC # BLD: 10.76 K/UL — SIGNIFICANT CHANGE UP (ref 4.8–10.8)
WBC # FLD AUTO: 10.76 K/UL — SIGNIFICANT CHANGE UP (ref 4.8–10.8)

## 2018-11-27 RX ORDER — INSULIN LISPRO 100/ML
20 VIAL (ML) SUBCUTANEOUS ONCE
Qty: 0 | Refills: 0 | Status: DISCONTINUED | OUTPATIENT
Start: 2018-11-27 | End: 2018-11-27

## 2018-11-27 RX ORDER — INSULIN ASPART 100 [IU]/ML
1 INJECTION, SOLUTION SUBCUTANEOUS
Qty: 0 | Refills: 0 | Status: DISCONTINUED | OUTPATIENT
Start: 2018-11-27 | End: 2018-11-27

## 2018-11-27 RX ORDER — PRASUGREL 5 MG/1
1 TABLET, FILM COATED ORAL
Qty: 30 | Refills: 0 | OUTPATIENT
Start: 2018-11-27 | End: 2018-12-26

## 2018-11-27 RX ADMIN — PANTOPRAZOLE SODIUM 40 MILLIGRAM(S): 20 TABLET, DELAYED RELEASE ORAL at 06:53

## 2018-11-27 RX ADMIN — Medication 81 MILLIGRAM(S): at 11:44

## 2018-11-27 RX ADMIN — TICAGRELOR 90 MILLIGRAM(S): 90 TABLET ORAL at 05:49

## 2018-11-27 RX ADMIN — DULOXETINE HYDROCHLORIDE 90 MILLIGRAM(S): 30 CAPSULE, DELAYED RELEASE ORAL at 11:44

## 2018-11-27 RX ADMIN — Medication 4 GRAM(S): at 11:44

## 2018-11-27 RX ADMIN — INSULIN ASPART 1 EACH: 100 INJECTION, SOLUTION SUBCUTANEOUS at 09:15

## 2018-11-27 RX ADMIN — Medication 100 MILLIGRAM(S): at 05:49

## 2018-11-27 RX ADMIN — Medication 40 MILLIGRAM(S): at 05:49

## 2018-11-27 NOTE — CHART NOTE - NSCHARTNOTEFT_GEN_A_CORE
<<<RESIDENT DISCHARGE NOTE>>>     FLOWER MARISCAL  [id number]    VITAL SIGNS:  T(F): 97.5 (11-27-18 @ 05:25), Max: 98.1 (11-26-18 @ 17:00)  HR: 88 (11-27-18 @ 05:25)  BP: 141/74 (11-27-18 @ 05:25)  SpO2: --      PHYSICAL EXAMINATION:  General: Well developed, AAOx3, NAD, lying comfortably  Head & Neck: Normocephalic, atraumatic. EOMI  Pulmonary: Clear to auscultation bilaterally, no crackles  Cardiovascular: S1S2 regular, no murmurs appreciated  Gastrointestinal/Abdomen & Pelvis: Soft, nontender, nondistended. Positive bowel sounds  Neurologic/Motor: Strength intact, ambulatory    TEST RESULTS:                        12.3   10.76 )-----------( 311      ( 27 Nov 2018 06:59 )             39.0       11-27    140  |  99  |  28<H>  ----------------------------<  197<H>  5.3<H>   |  27  |  1.2    Ca    9.4      27 Nov 2018 06:59  Mg     2.3     11-25        FINAL DISCHARGE INTERVIEW:  Resident(s) Present: (Name: Toshia Patel, RN Present: (Name: Angel)    DISCHARGE MEDICATION RECONCILIATION  reviewed with Attending (Name: Dr. Dominguez)    DISPOSITION:   [X] Home,    [  ] Home with Visiting Nursing Services,   [    ]  SNF/ NH,    [   ] Acute Rehab (4A),   [   ] Other (Specify:_________)

## 2018-11-29 DIAGNOSIS — Z88.8 ALLERGY STATUS TO OTHER DRUGS, MEDICAMENTS AND BIOLOGICAL SUBSTANCES: ICD-10-CM

## 2018-11-29 DIAGNOSIS — Z79.4 LONG TERM (CURRENT) USE OF INSULIN: ICD-10-CM

## 2018-11-29 DIAGNOSIS — J44.9 CHRONIC OBSTRUCTIVE PULMONARY DISEASE, UNSPECIFIED: ICD-10-CM

## 2018-11-29 DIAGNOSIS — T45.525A ADVERSE EFFECT OF ANTITHROMBOTIC DRUGS, INITIAL ENCOUNTER: ICD-10-CM

## 2018-11-29 DIAGNOSIS — R06.01 ORTHOPNEA: ICD-10-CM

## 2018-11-29 DIAGNOSIS — F32.9 MAJOR DEPRESSIVE DISORDER, SINGLE EPISODE, UNSPECIFIED: ICD-10-CM

## 2018-11-29 DIAGNOSIS — Z95.1 PRESENCE OF AORTOCORONARY BYPASS GRAFT: ICD-10-CM

## 2018-11-29 DIAGNOSIS — Z79.02 LONG TERM (CURRENT) USE OF ANTITHROMBOTICS/ANTIPLATELETS: ICD-10-CM

## 2018-11-29 DIAGNOSIS — R06.02 SHORTNESS OF BREATH: ICD-10-CM

## 2018-11-29 DIAGNOSIS — E78.5 HYPERLIPIDEMIA, UNSPECIFIED: ICD-10-CM

## 2018-11-29 DIAGNOSIS — M19.90 UNSPECIFIED OSTEOARTHRITIS, UNSPECIFIED SITE: ICD-10-CM

## 2018-11-29 DIAGNOSIS — Z96.41 PRESENCE OF INSULIN PUMP (EXTERNAL) (INTERNAL): ICD-10-CM

## 2018-11-29 DIAGNOSIS — N18.2 CHRONIC KIDNEY DISEASE, STAGE 2 (MILD): ICD-10-CM

## 2018-11-29 DIAGNOSIS — Z95.5 PRESENCE OF CORONARY ANGIOPLASTY IMPLANT AND GRAFT: ICD-10-CM

## 2018-11-29 DIAGNOSIS — E11.22 TYPE 2 DIABETES MELLITUS WITH DIABETIC CHRONIC KIDNEY DISEASE: ICD-10-CM

## 2018-11-29 DIAGNOSIS — I50.22 CHRONIC SYSTOLIC (CONGESTIVE) HEART FAILURE: ICD-10-CM

## 2018-11-29 DIAGNOSIS — I13.0 HYPERTENSIVE HEART AND CHRONIC KIDNEY DISEASE WITH HEART FAILURE AND STAGE 1 THROUGH STAGE 4 CHRONIC KIDNEY DISEASE, OR UNSPECIFIED CHRONIC KIDNEY DISEASE: ICD-10-CM

## 2018-11-29 DIAGNOSIS — I25.10 ATHEROSCLEROTIC HEART DISEASE OF NATIVE CORONARY ARTERY WITHOUT ANGINA PECTORIS: ICD-10-CM

## 2018-12-10 ENCOUNTER — RX RENEWAL (OUTPATIENT)
Age: 63
End: 2018-12-10

## 2018-12-11 ENCOUNTER — INPATIENT (INPATIENT)
Facility: HOSPITAL | Age: 63
LOS: 2 days | Discharge: HOME | End: 2018-12-14
Attending: INTERNAL MEDICINE | Admitting: INTERNAL MEDICINE

## 2018-12-11 VITALS
RESPIRATION RATE: 18 BRPM | DIASTOLIC BLOOD PRESSURE: 53 MMHG | OXYGEN SATURATION: 96 % | SYSTOLIC BLOOD PRESSURE: 95 MMHG | TEMPERATURE: 98 F | HEART RATE: 93 BPM

## 2018-12-11 DIAGNOSIS — Z95.5 PRESENCE OF CORONARY ANGIOPLASTY IMPLANT AND GRAFT: Chronic | ICD-10-CM

## 2018-12-11 LAB
ALBUMIN SERPL ELPH-MCNC: 3.9 G/DL — SIGNIFICANT CHANGE UP (ref 3.5–5.2)
ALP SERPL-CCNC: 78 U/L — SIGNIFICANT CHANGE UP (ref 30–115)
ALT FLD-CCNC: 25 U/L — SIGNIFICANT CHANGE UP (ref 0–41)
ANION GAP SERPL CALC-SCNC: 12 MMOL/L — SIGNIFICANT CHANGE UP (ref 7–14)
AST SERPL-CCNC: 18 U/L — SIGNIFICANT CHANGE UP (ref 0–41)
BASOPHILS # BLD AUTO: 0.06 K/UL — SIGNIFICANT CHANGE UP (ref 0–0.2)
BASOPHILS NFR BLD AUTO: 0.6 % — SIGNIFICANT CHANGE UP (ref 0–1)
BILIRUB SERPL-MCNC: 1.1 MG/DL — SIGNIFICANT CHANGE UP (ref 0.2–1.2)
BUN SERPL-MCNC: 30 MG/DL — HIGH (ref 10–20)
CALCIUM SERPL-MCNC: 8.9 MG/DL — SIGNIFICANT CHANGE UP (ref 8.5–10.1)
CHLORIDE SERPL-SCNC: 98 MMOL/L — SIGNIFICANT CHANGE UP (ref 98–110)
CK MB CFR SERPL CALC: 10.3 NG/ML — HIGH (ref 0.6–6.3)
CK MB CFR SERPL CALC: 9.3 NG/ML — HIGH (ref 0.6–6.3)
CK SERPL-CCNC: 512 U/L — HIGH (ref 0–225)
CK SERPL-CCNC: 553 U/L — HIGH (ref 0–225)
CO2 SERPL-SCNC: 26 MMOL/L — SIGNIFICANT CHANGE UP (ref 17–32)
CREAT SERPL-MCNC: 1.3 MG/DL — SIGNIFICANT CHANGE UP (ref 0.7–1.5)
EOSINOPHIL # BLD AUTO: 0.3 K/UL — SIGNIFICANT CHANGE UP (ref 0–0.7)
EOSINOPHIL NFR BLD AUTO: 3.1 % — SIGNIFICANT CHANGE UP (ref 0–8)
GLUCOSE BLDC GLUCOMTR-MCNC: 172 MG/DL — HIGH (ref 70–99)
GLUCOSE BLDC GLUCOMTR-MCNC: 291 MG/DL — HIGH (ref 70–99)
GLUCOSE SERPL-MCNC: 379 MG/DL — HIGH (ref 70–99)
HCT VFR BLD CALC: 30.8 % — LOW (ref 42–52)
HCT VFR BLD CALC: 33.1 % — LOW (ref 42–52)
HGB BLD-MCNC: 10.5 G/DL — LOW (ref 14–18)
HGB BLD-MCNC: 9.8 G/DL — LOW (ref 14–18)
IMM GRANULOCYTES NFR BLD AUTO: 0.6 % — HIGH (ref 0.1–0.3)
LYMPHOCYTES # BLD AUTO: 1.08 K/UL — LOW (ref 1.2–3.4)
LYMPHOCYTES # BLD AUTO: 11.1 % — LOW (ref 20.5–51.1)
MCHC RBC-ENTMCNC: 25.7 PG — LOW (ref 27–31)
MCHC RBC-ENTMCNC: 25.7 PG — LOW (ref 27–31)
MCHC RBC-ENTMCNC: 31.7 G/DL — LOW (ref 32–37)
MCHC RBC-ENTMCNC: 31.8 G/DL — LOW (ref 32–37)
MCV RBC AUTO: 80.8 FL — SIGNIFICANT CHANGE UP (ref 80–94)
MCV RBC AUTO: 80.9 FL — SIGNIFICANT CHANGE UP (ref 80–94)
MONOCYTES # BLD AUTO: 0.85 K/UL — HIGH (ref 0.1–0.6)
MONOCYTES NFR BLD AUTO: 8.7 % — SIGNIFICANT CHANGE UP (ref 1.7–9.3)
NEUTROPHILS # BLD AUTO: 7.37 K/UL — HIGH (ref 1.4–6.5)
NEUTROPHILS NFR BLD AUTO: 75.9 % — HIGH (ref 42.2–75.2)
NRBC # BLD: 0 /100 WBCS — SIGNIFICANT CHANGE UP (ref 0–0)
NT-PROBNP SERPL-SCNC: 1855 PG/ML — HIGH (ref 0–300)
PLATELET # BLD AUTO: 235 K/UL — SIGNIFICANT CHANGE UP (ref 130–400)
PLATELET # BLD AUTO: 277 K/UL — SIGNIFICANT CHANGE UP (ref 130–400)
POTASSIUM SERPL-MCNC: 5 MMOL/L — SIGNIFICANT CHANGE UP (ref 3.5–5)
POTASSIUM SERPL-SCNC: 5 MMOL/L — SIGNIFICANT CHANGE UP (ref 3.5–5)
PROT SERPL-MCNC: 6.2 G/DL — SIGNIFICANT CHANGE UP (ref 6–8)
RBC # BLD: 3.81 M/UL — LOW (ref 4.7–6.1)
RBC # BLD: 4.09 M/UL — LOW (ref 4.7–6.1)
RBC # FLD: 15.9 % — HIGH (ref 11.5–14.5)
RBC # FLD: 15.9 % — HIGH (ref 11.5–14.5)
SODIUM SERPL-SCNC: 136 MMOL/L — SIGNIFICANT CHANGE UP (ref 135–146)
TROPONIN T SERPL-MCNC: 0.13 NG/ML — CRITICAL HIGH
TROPONIN T SERPL-MCNC: 0.15 NG/ML — CRITICAL HIGH
TROPONIN T SERPL-MCNC: 0.16 NG/ML — CRITICAL HIGH
TROPONIN T SERPL-MCNC: 0.19 NG/ML — CRITICAL HIGH
WBC # BLD: 10.48 K/UL — SIGNIFICANT CHANGE UP (ref 4.8–10.8)
WBC # BLD: 9.72 K/UL — SIGNIFICANT CHANGE UP (ref 4.8–10.8)
WBC # FLD AUTO: 10.48 K/UL — SIGNIFICANT CHANGE UP (ref 4.8–10.8)
WBC # FLD AUTO: 9.72 K/UL — SIGNIFICANT CHANGE UP (ref 4.8–10.8)

## 2018-12-11 RX ORDER — FUROSEMIDE 40 MG
40 TABLET ORAL DAILY
Qty: 0 | Refills: 0 | Status: DISCONTINUED | OUTPATIENT
Start: 2018-12-11 | End: 2018-12-12

## 2018-12-11 RX ORDER — ASPIRIN/CALCIUM CARB/MAGNESIUM 324 MG
81 TABLET ORAL DAILY
Qty: 0 | Refills: 0 | Status: DISCONTINUED | OUTPATIENT
Start: 2018-12-11 | End: 2018-12-14

## 2018-12-11 RX ORDER — ATORVASTATIN CALCIUM 80 MG/1
80 TABLET, FILM COATED ORAL AT BEDTIME
Qty: 0 | Refills: 0 | Status: DISCONTINUED | OUTPATIENT
Start: 2018-12-11 | End: 2018-12-14

## 2018-12-11 RX ORDER — DULOXETINE HYDROCHLORIDE 30 MG/1
90 CAPSULE, DELAYED RELEASE ORAL DAILY
Qty: 0 | Refills: 0 | Status: DISCONTINUED | OUTPATIENT
Start: 2018-12-11 | End: 2018-12-14

## 2018-12-11 RX ORDER — METOPROLOL TARTRATE 50 MG
100 TABLET ORAL DAILY
Qty: 0 | Refills: 0 | Status: DISCONTINUED | OUTPATIENT
Start: 2018-12-11 | End: 2018-12-14

## 2018-12-11 RX ORDER — PRASUGREL 5 MG/1
10 TABLET, FILM COATED ORAL DAILY
Qty: 0 | Refills: 0 | Status: DISCONTINUED | OUTPATIENT
Start: 2018-12-11 | End: 2018-12-14

## 2018-12-11 RX ORDER — ASPIRIN/CALCIUM CARB/MAGNESIUM 324 MG
162 TABLET ORAL DAILY
Qty: 0 | Refills: 0 | Status: DISCONTINUED | OUTPATIENT
Start: 2018-12-11 | End: 2018-12-11

## 2018-12-11 RX ADMIN — Medication 162 MILLIGRAM(S): at 09:02

## 2018-12-11 RX ADMIN — ATORVASTATIN CALCIUM 80 MILLIGRAM(S): 80 TABLET, FILM COATED ORAL at 21:51

## 2018-12-11 NOTE — ED PROVIDER NOTE - PROGRESS NOTE DETAILS
+troponin without EKG changes. Spoke with Dr. Puckett, cardiology fellow, who will come see the patient.

## 2018-12-11 NOTE — H&P ADULT - PMH
Atherosclerosis of coronary artery  CAD (coronary artery disease)  Blood clot due to device, implant, or graft  was on blood thinners  Chronic systolic CHF (congestive heart failure)    CKD (chronic kidney disease) stage 2, GFR 60-89 ml/min    COPD, moderate    Diabetes    History of surgery to heart and great vessels, presenting hazards to health  x 4 in 2005  HLD (hyperlipidemia)    HTN (hypertension)    Hyperkalemia    Osteoarthritis    Status post percutaneous transluminal coronary angioplasty  in 2012  Type 2 diabetes mellitus  DM (diabetes mellitus), type 2 Atherosclerosis of coronary artery  CAD (coronary artery disease)  Blood clot due to device, implant, or graft  was on blood thinners  Chronic systolic CHF (congestive heart failure)    Diabetes    History of surgery to heart and great vessels, presenting hazards to health  x 4 in 2005  HLD (hyperlipidemia)    Osteoarthritis    Status post percutaneous transluminal coronary angioplasty  in 2012

## 2018-12-11 NOTE — ED PROVIDER NOTE - MEDICAL DECISION MAKING DETAILS
Seen at bedside by Cardiology.  Will admit Tele.  Serial enzymes and EKGs.  Possible PCI if symptoms persist or enzymes trend upwards.

## 2018-12-11 NOTE — ED PROVIDER NOTE - PHYSICAL EXAMINATION
Constitutional: Well developed, well nourished. NAD. Good general hygiene  Head: Atraumatic.  Eyes: EOMI without discomfort.   ENT: No nasal discharge. Mucous membranes moist.  Neck: Supple. Painless ROM.  Cardiovascular: Regular rhythm. Regular rate. Normal S1 and S2. No murmurs. 2+ pulses in all extremities.   Pulmonary: Normal respiratory rate and effort. Lungs clear to auscultation bilaterally. No wheezing, rales, or rhonchi. Bilateral, equal lung expansion.   Abdominal: Soft. Nondistended. Nontender. No rebound or guarding.   Extremities: Pelvis stable. Bilateral pitting edema. Symmetric calves.  Skin: No rashes.   Neuro: AAOx3. No focal neurological deficits.  Psych: Normal mood. Normal affect.

## 2018-12-11 NOTE — ED PROVIDER NOTE - ATTENDING CONTRIBUTION TO CARE
64 y/o male with multiple medical problems including hx of CAD s/p multiple stents with recent MI and RCA stent with thrombosis in 10/2018, on DAP presents to ED with SOB/JUNG x 2 days.  No CP/SOB.  (+) 15-20 lb weight gain.  PE:  agree with above.  A/P:  JUNG, r/o ACS.  Check EKG, Labs including cardiac enzymes and reassess.

## 2018-12-11 NOTE — CONSULT NOTE ADULT - ASSESSMENT
63 yom with pmh of hfref, cad, chf, copd, ckd, dld, dm, htn p/w cc of sob since 2 days ptp; pt complaints of gaining weight since 2 weeks; he usually weighs around 250 and now is 270 Lb; he is found to have anemia of 9.8 lower than his baseline which is around 12.3 and elevated troponins    # Elevated troponin/ NSTEMI  # HFrEF  # H/o CAD  # Acute Anemia  - c/w asprin, effient, statin, BB  - repeat echo  - Trend Cardiac enzymes  - check guaic to r/o etiology of anemia 63 yom with pmh of hfref, cad, chf, copd, ckd, dld, dm, htn p/w cc of sob since 2 days ptp; pt complaints of gaining weight since 2 weeks; he usually weighs around 250 and now is 270 Lb; he is found to have anemia of 9.8 lower than his baseline which is around 12.3 and elevated troponins    # Elevated troponin/ NSTEMI  # HFrEF  # H/o CAD  # Acute Anemia  - c/w telemetry monitoring  - f/u serial troponins q 4-6 h  - f/u TTE to assess LVEF  - c/w ASA, effient, BB and other cardiac medications.  - do not initiate ACE inhibitors due to allergy (generalized rash). Cannot tolerate ARB/Entresto due to hypotension currently.  - r/o lower GI bleed and other possible sources of bleeding in light of acute anemia  - consider cardiac angiogram once bleeding possibility is ruled out.  - call Cardiac fellow if any change in patient's clinical status.

## 2018-12-11 NOTE — H&P ADULT - NSHPPHYSICALEXAM_GEN_ALL_CORE
ICU Vital Signs Last 24 Hrs  T(C): 36.8 (11 Dec 2018 07:21), Max: 36.8 (11 Dec 2018 07:21)  T(F): 98.2 (11 Dec 2018 07:21), Max: 98.2 (11 Dec 2018 07:21)  HR: 93 (11 Dec 2018 07:21) (93 - 93)  BP: 95/53 (11 Dec 2018 07:21) (95/53 - 95/53)  BP(mean): --  ABP: --  ABP(mean): --  RR: 18 (11 Dec 2018 07:21) (18 - 18)  SpO2: 96% (11 Dec 2018 07:21) (96% - 96%)    PHYSICAL EXAM:  GENERAL: NAD, speaks in full sentences, no signs of respiratory distress  HEAD:  Atraumatic, Normocephalic  EYES: EOMI, PERRLA, conjunctiva and sclera clear  NECK: Supple, No JVD  CHEST/LUNG: Clear to auscultation bilaterally; No wheeze; No crackles; No accessory muscles used  HEART: Regular rate and rhythm; No murmurs;   ABDOMEN: Soft, Nontender, Nondistended; Bowel sounds present; No guarding  EXTREMITIES:  2+ Peripheral Pulses, No cyanosis or edema  PSYCH: AAOx3  NEUROLOGY: non-focal  SKIN: No rashes or lesions ICU Vital Signs Last 24 Hrs  T(C): 36.8 (11 Dec 2018 07:21), Max: 36.8 (11 Dec 2018 07:21)  T(F): 98.2 (11 Dec 2018 07:21), Max: 98.2 (11 Dec 2018 07:21)  HR: 93 (11 Dec 2018 07:21) (93 - 93)  BP: 95/53 (11 Dec 2018 07:21) (95/53 - 95/53)  BP(mean): --  ABP: --  ABP(mean): --  RR: 18 (11 Dec 2018 07:21) (18 - 18)  SpO2: 96% (11 Dec 2018 07:21) (96% - 96%)    PHYSICAL EXAM:  GENERAL: NAD, speaks in full sentences, no signs of respiratory distress  HEAD:  Atraumatic, Normocephalic  EYES: EOMI, PERRLA, conjunctiva and sclera clear  NECK: Supple, No JVD  CHEST/LUNG: Clear to auscultation bilaterally; No wheeze; No crackles; No accessory muscles used  HEART: Regular rate and rhythm; No murmurs;   ABDOMEN: Soft, Nontender, Nondistended; Bowel sounds present; No guarding  EXTREMITIES:  1+ edema stable,chronic  PSYCH: AAOx3  NEUROLOGY: non-focal  SKIN: No rashes or lesions ICU Vital Signs Last 24 Hrs  T(C): 36.8 (11 Dec 2018 07:21), Max: 36.8 (11 Dec 2018 07:21)  T(F): 98.2 (11 Dec 2018 07:21), Max: 98.2 (11 Dec 2018 07:21)  HR: 93 (11 Dec 2018 07:21) (93 - 93)  BP: 95/53 (11 Dec 2018 07:21) (95/53 - 95/53)  BP(mean): --  ABP: --  ABP(mean): --  RR: 18 (11 Dec 2018 07:21) (18 - 18)  SpO2: 96% (11 Dec 2018 07:21) (96% - 96%)    PHYSICAL EXAM:  GENERAL: NAD, speaks in full sentences, no signs of respiratory distress  HEAD:  Atraumatic, Normocephalic  EYES: EOMI, PERRLA, conjunctiva and sclera clear  NECK: Supple, No JVD  CV: Regular rate and rhythm; No murmurs;   GI Soft, Nontender, Nondistended; Bowel sounds present; No guarding  EXTREMITIES:  1+ edema stable,chronic  PSYCH: AAOx3  NEUROLOGY: non-focal  SKIN: No rashes or lesions

## 2018-12-11 NOTE — H&P ADULT - NSHPLABSRESULTS_GEN_ALL_CORE
9.8    10.48 )-----------( 235      ( 11 Dec 2018 07:41 )             30.8       12-11    136  |  98  |  30<H>  ----------------------------<  379<H>  5.0   |  26  |  1.3    Ca    8.9      11 Dec 2018 07:41    TPro  6.2  /  Alb  3.9  /  TBili  1.1  /  DBili  x   /  AST  18  /  ALT  25  /  AlkPhos  78  12-11    CARDIAC MARKERS ( 11 Dec 2018 07:41 )  x     / 0.19 ng/mL / x     / x     / x        Serum Pro-Brain Natriuretic Peptide (12.11.18 @ 07:55)    Serum Pro-Brain Natriuretic Peptide: 1855 pg/mL 9.8    10.48 )-----------( 235      ( 11 Dec 2018 07:41 )             30.8       12-11    136  |  98  |  30<H>  ----------------------------<  379<H>  5.0   |  26  |  1.3    Ca    8.9      11 Dec 2018 07:41    TPro  6.2  /  Alb  3.9  /  TBili  1.1  /  DBili  x   /  AST  18  /  ALT  25  /  AlkPhos  78  12-11    CARDIAC MARKERS ( 11 Dec 2018 07:41 )  x     / 0.19 ng/mL / x     / x     / x        Serum Pro-Brain Natriuretic Peptide (12.11.18 @ 07:55)    Serum Pro-Brain Natriuretic Peptide: 1855 pg/mL    EKG which I reviewed shows NSR   chest xray which I reviewed shows no acute changes

## 2018-12-11 NOTE — H&P ADULT - HISTORY OF PRESENT ILLNESS
63y M w PMH CAD s/p multiple stents in 2006 and MI in October s/p RCA stent complicated by stent thrombosis in October, EF 40%, COPD, CKD, DM, HTN presents for SOB x 2 days, worse with exertion. No f/c/n/v/d. No CP or palpitations. No dizziness.  Pt also states he has had significant weight gain despite watching his diet.    In ED vitals: BP; 95/53, 93,98.2F,18, 96% on room air, Labs No inc WBC, Hb of 9.8 baseline (12), BNP 1855, trops 0.19, chest xray showed pulm vascular congestion, EKG normal sinus rhythm with left bundle branch block (not new). 63y M w PMH CAD s/p angioplasty in  and MI in October s/p RCA stent complicated by stent thrombosis in October, HFrEF( EF 40%),DM (on insulin pump, Jardiance),DLD, Osteoarthritis presents here with chief complaint of SOB x 2 days, progressively worsening more with exertion, slightly relieved with rest, orthopnea, associated with dry cough. Also reports weight gain but couldn't measure exactly how much, denies fever, chills, chest pain, palpitations, abd pain, loose stools, nausea, vomiting, michael, hematochezia,headache, dizziness, heart burn, dysuria.  family history significant for mother  of heart problem at the age of 60, no siblings.    he said he was admitted in the hospital in November for SOB work up was negative for SOB, he was told that SOB was due to side effect of brilinta that was d/jovanni at that time he felt better after that.     In ED vitals: BP; 95/53, 93,98.2F,18, 96% on room air, Labs No inc WBC, Hb of 9.8 baseline (12), BNP 1855, trops 0.19, chest xray showed pulm vascular congestion, EKG normal sinus rhythm with left bundle branch block (not new).

## 2018-12-11 NOTE — CONSULT NOTE ADULT - SUBJECTIVE AND OBJECTIVE BOX
Patient is a 63y old  Male who presents with a chief complaint of     HPI:  63 yom with pmh of hfref, cad, chf, copd, ckd, dld, dm, htn p/w cc of sob since 2 days ptp; pt complaints of gaining weight since 2 weeks; he usually weighs around 250 and now is 270 Lb; Sob is on exertion;    PAST MEDICAL & SURGICAL HISTORY:  Diabetes  Blood clot due to device, implant, or graft: was on blood thinners  CKD (chronic kidney disease) stage 2, GFR 60-89 ml/min  COPD, moderate  Hyperkalemia  HLD (hyperlipidemia)  Chronic systolic CHF (congestive heart failure)  Osteoarthritis  HTN (hypertension)  Type 2 diabetes mellitus: DM (diabetes mellitus), type 2  History of surgery to heart and great vessels, presenting hazards to health: x 4 in 2005  Atherosclerosis of coronary artery: CAD (coronary artery disease)  Status post percutaneous transluminal coronary angioplasty: in 2012  Stented coronary artery  Other postprocedural status: Fixation hardware in foot    SOCIAL HX:   Smoking                         ETOH                            Other    FAMILY HISTORY:  Family history of heart disease (Mother)    PHYSICAL EXAM  Vital Signs Last 24 Hrs  T(C): 36.8 (11 Dec 2018 07:21), Max: 36.8 (11 Dec 2018 07:21)  T(F): 98.2 (11 Dec 2018 07:21), Max: 98.2 (11 Dec 2018 07:21)  HR: 93 (11 Dec 2018 07:21) (93 - 93)  BP: 95/53 (11 Dec 2018 07:21) (95/53 - 95/53)  BP(mean): --  RR: 18 (11 Dec 2018 07:21) (18 - 18)  SpO2: 96% (11 Dec 2018 07:21) (96% - 96%)    General: In NAD  Lungs: Akira BS  Cardiovascular: Regular, S1, S2  Abdomen: Soft.  NT, ND  Extremities: No b/l LE edema  Neurological: Non Focal ; AAO *3    LABS:             9.8    10.48 )-----------( 235      ( 11 Dec 2018 07:41 )             30.8                                               12-11    136  |  98  |  30<H>  ----------------------------<  379<H>  5.0   |  26  |  1.3    Ca    8.9      11 Dec 2018 07:41    TPro  6.2  /  Alb  3.9  /  TBili  1.1  /  DBili  x   /  AST  18  /  ALT  25  /  AlkPhos  78  12-11                                          CARDIAC MARKERS ( 11 Dec 2018 07:41 )  x     / 0.19 ng/mL / x     / x     / x                                                LIVER FUNCTIONS - ( 11 Dec 2018 07:41 )  Alb: 3.9 g/dL / Pro: 6.2 g/dL / ALK PHOS: 78 U/L / ALT: 25 U/L / AST: 18 U/L / GGT: x                                              MEDICATIONS  (STANDING):  aspirin  chewable 162 milliGRAM(s) Oral daily    MEDICATIONS  (PRN):    < from: Transthoracic Echocardiogram (11.26.18 @ 08:20) >   1. Left ventricular ejection fraction, by visual estimation, is 35 to   40%.   2. Moderately decreased global left ventricular systolic function.   3. Multiple left ventricular regional wall motion abnormalities exist.   See wall motion findings.   4. Spectral Doppler shows pseudonormal pattern of left ventricular   myocardial filling (Grade II diastolic dysfunction).   5. Mild mitral regurgitation.    < end of copied text >    < from: Cardiac Cath Lab - Adult (10.12.18 @ 12:27) >  --  Intervention on OM1: stent, balloon angioplasty.    --  Intervention on OM1: percutaneous intervention.    < end of copied text >  < from: Cardiac Cath Lab - Adult (10.10.18 @ 04:36) >  -  Intervention on distal RCA: drug-eluting stent.    < end of copied text > Patient is a 63y old  Male who presents with a chief complaint of     HPI:  63 yom with pmh of hfref, cad s/p CABG and PCI, recent MI following stent thrombosis, copd, ckd, dld, dm, htn p/w cc of sob since 2 days. SOB is worse on exertion, denies cough/fevers. pt complaints of gaining weight since 2 weeks; he usually weighs around 250 and now is 270 Lb;   Patient denies CP/palpitaions/dizziness.  Patient wasnt feeling well thus came to ED for further evaluation    PAST MEDICAL & SURGICAL HISTORY:  Diabetes  Blood clot due to device, implant, or graft: was on blood thinners  CKD (chronic kidney disease) stage 2, GFR 60-89 ml/min  COPD, moderate  Hyperkalemia  HLD (hyperlipidemia)  Chronic systolic CHF (congestive heart failure)  Osteoarthritis  HTN (hypertension)  Type 2 diabetes mellitus: DM (diabetes mellitus), type 2  History of surgery to heart and great vessels, presenting hazards to health: x 4 in 2005  Atherosclerosis of coronary artery: CAD (coronary artery disease)  Status post percutaneous transluminal coronary angioplasty: in 2012  Stented coronary artery  Other postprocedural status: Fixation hardware in foot    FAMILY HISTORY:  Family history of heart disease (Mother)    PHYSICAL EXAM  Vital Signs Last 24 Hrs  T(C): 36.8 (11 Dec 2018 07:21), Max: 36.8 (11 Dec 2018 07:21)  T(F): 98.2 (11 Dec 2018 07:21), Max: 98.2 (11 Dec 2018 07:21)  HR: 93 (11 Dec 2018 07:21) (93 - 93)  BP: 95/53 (11 Dec 2018 07:21) (95/53 - 95/53)  BP(mean): --  RR: 18 (11 Dec 2018 07:21) (18 - 18)  SpO2: 96% (11 Dec 2018 07:21) (96% - 96%)    General: In NAD  Lungs: cta b/l  Cardiovascular: Regular, S1, S2, no added sounds  Abdomen: Soft.  NT, ND  Extremities: No b/l LE edema  Neurological: Non Focal ; AAO *3    LABS:             9.8    10.48 )-----------( 235      ( 11 Dec 2018 07:41 )             30.8                                               12-11    136  |  98  |  30<H>  ----------------------------<  379<H>  5.0   |  26  |  1.3    Ca    8.9      11 Dec 2018 07:41    TPro  6.2  /  Alb  3.9  /  TBili  1.1  /  DBili  x   /  AST  18  /  ALT  25  /  AlkPhos  78  12-11                                          CARDIAC MARKERS ( 11 Dec 2018 07:41 )  x     / 0.19 ng/mL / x     / x     / x                                                LIVER FUNCTIONS - ( 11 Dec 2018 07:41 )  Alb: 3.9 g/dL / Pro: 6.2 g/dL / ALK PHOS: 78 U/L / ALT: 25 U/L / AST: 18 U/L / GGT: x                                              MEDICATIONS  (STANDING):  aspirin  chewable 162 milliGRAM(s) Oral daily    MEDICATIONS  (PRN):    < from: Transthoracic Echocardiogram (11.26.18 @ 08:20) >   1. Left ventricular ejection fraction, by visual estimation, is 35 to   40%.   2. Moderately decreased global left ventricular systolic function.   3. Multiple left ventricular regional wall motion abnormalities exist.   See wall motion findings.   4. Spectral Doppler shows pseudonormal pattern of left ventricular   myocardial filling (Grade II diastolic dysfunction).   5. Mild mitral regurgitation.    < end of copied text >    < from: Cardiac Cath Lab - Adult (10.12.18 @ 12:27) >  --  Intervention on OM1: stent, balloon angioplasty.    --  Intervention on OM1: percutaneous intervention.    < end of copied text >  < from: Cardiac Cath Lab - Adult (10.10.18 @ 04:36) >  -  Intervention on distal RCA: drug-eluting stent.    < end of copied text > Patient is a 63y old  Male who presents with a chief complaint of     HPI:  63 yom with pmh of hfref, cad s/p CABG and PCI, recent MI following stent thrombosis, copd, ckd, dld, dm, htn p/w cc of sob since 2 days. SOB is worse on exertion, denies cough/fevers. pt complaints of gaining weight since 2 weeks; he usually weighs around 250 and now is 270 Lb;   Patient denies CP/palpitaions/dizziness.  Patient wasnt feeling well thus came to ED for further evaluation    PAST MEDICAL & SURGICAL HISTORY:  Diabetes  Blood clot due to device, implant, or graft: was on blood thinners  CKD (chronic kidney disease) stage 2, GFR 60-89 ml/min  COPD, moderate  Hyperkalemia  HLD (hyperlipidemia)  Chronic systolic CHF (congestive heart failure)  Osteoarthritis  HTN (hypertension)  Type 2 diabetes mellitus: DM (diabetes mellitus), type 2  History of surgery to heart and great vessels, presenting hazards to health: x 4 in 2005  Atherosclerosis of coronary artery: CAD (coronary artery disease)  Status post percutaneous transluminal coronary angioplasty: in 2012  Stented coronary artery  Other postprocedural status: Fixation hardware in foot    FAMILY HISTORY:  Family history of heart disease (Mother)    PHYSICAL EXAM  Vital Signs Last 24 Hrs  T(C): 36.8 (11 Dec 2018 07:21), Max: 36.8 (11 Dec 2018 07:21)  T(F): 98.2 (11 Dec 2018 07:21), Max: 98.2 (11 Dec 2018 07:21)  HR: 93 (11 Dec 2018 07:21) (93 - 93)  BP: 95/53 (11 Dec 2018 07:21) (95/53 - 95/53)  BP(mean): --  RR: 18 (11 Dec 2018 07:21) (18 - 18)  SpO2: 96% (11 Dec 2018 07:21) (96% - 96%)    General: In NAD  Lungs: cta b/l  Cardiovascular: Regular, S1, S2, no added sounds  Abdomen: Soft.  NT, ND  Extremities: No b/l LE edema  Neurological: Non Focal ; AAO *3    LABS:             9.8    10.48 )-----------( 235      ( 11 Dec 2018 07:41 )             30.8                                               12-11    136  |  98  |  30<H>  ----------------------------<  379<H>  5.0   |  26  |  1.3    Ca    8.9      11 Dec 2018 07:41    TPro  6.2  /  Alb  3.9  /  TBili  1.1  /  DBili  x   /  AST  18  /  ALT  25  /  AlkPhos  78  12-11                                          CARDIAC MARKERS ( 11 Dec 2018 07:41 )  x     / 0.19 ng/mL / x     / x     / x                                                LIVER FUNCTIONS - ( 11 Dec 2018 07:41 )  Alb: 3.9 g/dL / Pro: 6.2 g/dL / ALK PHOS: 78 U/L / ALT: 25 U/L / AST: 18 U/L / GGT: x                                              MEDICATIONS  (STANDING):  aspirin  chewable 162 milliGRAM(s) Oral daily    MEDICATIONS  (PRN):    < from: Transthoracic Echocardiogram (11.26.18 @ 08:20) >   1. Left ventricular ejection fraction, by visual estimation, is 35 to   40%.   2. Moderately decreased global left ventricular systolic function.   3. Multiple left ventricular regional wall motion abnormalities exist.   See wall motion findings.   4. Spectral Doppler shows pseudonormal pattern of left ventricular   myocardial filling (Grade II diastolic dysfunction).   5. Mild mitral regurgitation.    < end of copied text >    < from: Cardiac Cath Lab - Adult (10.12.18 @ 12:27) >  --  Intervention on OM1: stent, balloon angioplasty.    --  Intervention on OM1: percutaneous intervention.    < end of copied text >  < from: Cardiac Cath Lab - Adult (10.10.18 @ 04:36) >  -  Intervention on distal RCA: drug-eluting stent.    < end of copied text >    EKG      Ventricular Rate 79 BPM    Atrial Rate 79 BPM    P-R Interval 184 ms    QRS Duration 142 ms    Q-T Interval 404 ms    QTC Calculation(Bezet) 463 ms    P Axis 59 degrees    R Axis -50 degrees    T Axis 107 degrees    Diagnosis Line Normal sinus rhythm  Left axis deviation  Left bundle branch block  Abnormal ECG    Confirmed by MCKAYLA WILSON MD ([id number]) on 12/11/2018 11:09:39 AM

## 2018-12-11 NOTE — PATIENT PROFILE ADULT - NSPROHMCARDIOMGMTSTRAT_GEN_A_NUR
diet modification/exercise/activity/fluid modification/medication therapy/adequate rest/weight management

## 2018-12-11 NOTE — H&P ADULT - FAMILY HISTORY
Mother  Still living? Unknown  Family history of heart disease, Age at diagnosis: Age Unknown Mother  Still living? No  Family history of heart disease, Age at diagnosis: Age Unknown  Family history of heart disease, Age at diagnosis: Age Unknown

## 2018-12-11 NOTE — H&P ADULT - NSHPOUTPATIENTPROVIDERS_GEN_ALL_CORE
PCP:  Cardiology  Pulmonology PCP: Dr. Caldera  [facility]  Pulmonary: Dr. Villeda  Pulmonology

## 2018-12-11 NOTE — ED PROVIDER NOTE - NS ED ROS FT
Constitutional: No fever or chills. Normal appetite. No unintended weight loss. +weight gain  Eyes: No vision changes.  ENT: No hearing changes. No ear pain. No sore throat.  Neck: No neck pain or stiffness.  Cardiovascular: No chest pain, palpitations, or edema.  Pulmonary: No cough. + SOB. No hemoptysis.  Abdominal: No abdominal pain, nausea, vomiting, or diarrhea.   : No dysuria or frequency. No hematuria.   Neuro: No headache, syncope, or dizziness.  MS: No back pain. No calf pain/swelling.  Psych: No suicidal or homicidal ideations.

## 2018-12-11 NOTE — ED ADULT NURSE NOTE - OBJECTIVE STATEMENT
shortness of breathe on exertion symptoms started yesterday while working and worsened this morning and has been having trouble sleeping due to the SOB. denies chest pain, states he feels like he also gained some weight within the last 2 weeks.

## 2018-12-11 NOTE — ED PROVIDER NOTE - OBJECTIVE STATEMENT
63y M w PMH CAD s/p multiple stents in 2006 and MI in October s/p RCA stent complicated by stent thrombosis in October, EF 40%, COPD, CKD, DM, HTN presents for SOB x 2 days, worse with exertion. No f/c/n/v/d. No CP or palpitations. No dizziness.  Pt also states he has had significant weight gain despite watching his diet.

## 2018-12-11 NOTE — H&P ADULT - ASSESSMENT
#) NSTEMI/Elevated trops  #)CHF (HFrEF) Exacerbation    #)COPD  #)CKD  #)DM  #)HTN  #)DLD      #)Diet:  #)Activtiy:  #)DVT ppx:  #)GI ppx:  #)Dispo:  #)Code: 63y M w PMH CAD s/p angioplasty in 2012 and MI in October s/p RCA stent complicated by stent thrombosis in October, HFrEF( EF 40%),DM,DLD, Osteoarthritis presents here with chief complaint of SOB x 2 days was found to have elevated trops.    #)SOB due to chronic HFrEF (Clinically No crackles, 1+ edema which was there before stable, No raised JVP, chest xray was similar to before to my interpretation, BNP 1855)  -No signs of exacerbation  -Echo in November EF 35-40%, Grade II DD, mild MR  -repeat 2d echo  -c/w home dose of lasix oral 40mg  -c/w telemetry monitoring  -Strict I&o  -Daily weights  -Repeat Chest xray AM    #) NSTEMI/Elevated trops  -First set 0.19, EKG showed normal sinus rhythm with LBBB old  -Follow up repeat EKG  -trend CE, if increasing call cardiology  -c/w telemtry monitoring  -c/w aspirin, effient, BB, not on ACE inhibitor because of allergy  -Follow up with cardiology    #)Anemia baseline Hb is 12-13  -Today Hb is 9.8  -Denied any signs and symptoms of bleeding (No alaarm symptoms weight loss, dysphagia, melena, hematochezia, family history) takes motrin every day for pain one tablet no heart burn, GERD  -NE done by ED negative, will do occult stool  -Repeat Hb at 1600    #)DM on insulin pump and Jardiance at home  -Hold home meds  -check FS, If FS>180 resume inuslin  -c/w cymbalta    #)DLD  on crestor 20 at home changed to 80 at bedtime    #)Diet: CC diet  #)Activtiy: AMbulate with assistance  #)DVT ppx: SCD  #)GI ppx: Not indicated  #)Dispo: From home  #)Code: Full 63y M w PMH CAD s/p angioplasty in 2012 and MI in October s/p RCA stent complicated by stent thrombosis in October, HFrEF( EF 40%),DM,DLD, Osteoarthritis presents here with chief complaint of SOB x 2 days was found to have elevated trops.    #)SOB due to chronic HFrEF (Clinically No crackles, 1+ edema which was there before stable, No raised JVP, chest xray was similar to before to my interpretation, BNP 1855)  -No signs of exacerbation  -Echo in November EF 35-40%, Grade II DD, mild MR  -repeat 2d echo  -c/w home dose of lasix oral 40mg  -c/w telemetry monitoring  -Strict I&o  -Daily weights  -Repeat Chest xray AM    #) NSTEMI/Elevated trops  -First set 0.19, EKG showed normal sinus rhythm with LBBB old  -Follow up repeat EKG  -trend CE, if increasing call cardiology  -c/w telemtry monitoring  -c/w aspirin, effient, BB, not on ACE inhibitor because of allergy  -Follow up with cardiology    #)Anemia baseline Hb is 12-13  -Today Hb is 9.8, Trend CBC, active type and screen  -Denied any signs and symptoms of bleeding (No alaarm symptoms weight loss, dysphagia, melena, hematochezia, family history) takes motrin every day for pain one tablet no heart burn, GERD  -GA done by ED negative, will do occult stool  -Repeat Hb at 1600  -Follow up iron studies    #)h/o CAD  c/w aspirin, effient, BB    #)DM on insulin pump and Jardiance at home  -Hold home meds  -check FS, If FS>180 resume inuslin  -c/w cymbalta    #)DLD  on crestor 20 at home changed to 80 at bedtime    #)Diet: CC diet  #)Activtiy: AMbulate with assistance  #)DVT ppx: SCD  #)GI ppx: Not indicated  #)Dispo: From home  #)Code: Full 63y M w PMH CAD s/p angioplasty in 2012 and MI in October s/p RCA stent complicated by stent thrombosis in October, HFrEF( EF 40%),DM,DLD, Osteoarthritis presents here with chief complaint of SOB x 2 days was found to have elevated trops.    #)SOB due to chronic HFrEF (Clinically No crackles, 1+ edema which was there before stable, No raised JVP, chest xray was similar to before to my interpretation, BNP 1855)  -No signs of exacerbation  -Echo in November EF 35-40%, Grade II DD, mild MR  -repeat 2d echo  -c/w home dose of lasix oral 40mg  -c/w telemetry monitoring  -Strict I&o  -Daily weights  -Repeat Chest xray AM    #) NSTEMI/Elevated trops (HEART score 7) Risk of MACE is high  -First set 0.19, EKG showed normal sinus rhythm with LBBB old  -Follow up repeat EKG  -trend CE, if increasing call cardiology  -c/w telemtry monitoring  -c/w aspirin, effient, BB, not on ACE inhibitor because of allergy  -Follow up with cardiology    #)Anemia baseline Hb is 12-13  -Today Hb is 9.8, Trend CBC, active type and screen  -Denied any signs and symptoms of bleeding (No alaarm symptoms weight loss, dysphagia, melena, hematochezia, family history) takes motrin every day for pain one tablet no heart burn, GERD  -VT done by ED negative, will do occult stool  -Repeat Hb at 1600  -Follow up iron studies    #)h/o CAD  c/w aspirin, effient, BB    #)DM on insulin pump and Jardiance at home  -Hold home meds  -check FS, If FS>180 resume inuslin  -c/w cymbalta    #)DLD  on crestor 20 at home changed to 80 at bedtime    #)Diet: CC diet  #)Activtiy: AMbulate with assistance  #)DVT ppx: SCD, until ruled out bleed  #)GI ppx: Not indicated  #)Dispo: From home  #)Code: Full

## 2018-12-11 NOTE — ED ADULT NURSE NOTE - NSIMPLEMENTINTERV_GEN_ALL_ED
Implemented All Universal Safety Interventions:  [place] to call system. Call bell, personal items and telephone within reach. Instruct patient to call for assistance. Room bathroom lighting operational. Non-slip footwear when patient is off stretcher. Physically safe environment: no spills, clutter or unnecessary equipment. Stretcher in lowest position, wheels locked, appropriate side rails in place.

## 2018-12-12 PROBLEM — N18.2 CHRONIC KIDNEY DISEASE, STAGE 2 (MILD): Chronic | Status: INACTIVE | Noted: 2018-05-31 | Resolved: 2018-12-11

## 2018-12-12 PROBLEM — I10 ESSENTIAL (PRIMARY) HYPERTENSION: Chronic | Status: INACTIVE | Noted: 2018-05-23 | Resolved: 2018-12-11

## 2018-12-12 PROBLEM — J44.9 CHRONIC OBSTRUCTIVE PULMONARY DISEASE, UNSPECIFIED: Chronic | Status: INACTIVE | Noted: 2018-05-31 | Resolved: 2018-12-11

## 2018-12-12 PROBLEM — E87.5 HYPERKALEMIA: Chronic | Status: INACTIVE | Noted: 2018-05-23 | Resolved: 2018-12-11

## 2018-12-12 LAB
ANION GAP SERPL CALC-SCNC: 12 MMOL/L — SIGNIFICANT CHANGE UP (ref 7–14)
BASOPHILS # BLD AUTO: 0.06 K/UL — SIGNIFICANT CHANGE UP (ref 0–0.2)
BASOPHILS NFR BLD AUTO: 0.7 % — SIGNIFICANT CHANGE UP (ref 0–1)
BUN SERPL-MCNC: 23 MG/DL — HIGH (ref 10–20)
CALCIUM SERPL-MCNC: 9.4 MG/DL — SIGNIFICANT CHANGE UP (ref 8.5–10.1)
CHLORIDE SERPL-SCNC: 104 MMOL/L — SIGNIFICANT CHANGE UP (ref 98–110)
CO2 SERPL-SCNC: 29 MMOL/L — SIGNIFICANT CHANGE UP (ref 17–32)
CREAT SERPL-MCNC: 1.1 MG/DL — SIGNIFICANT CHANGE UP (ref 0.7–1.5)
EOSINOPHIL # BLD AUTO: 0.34 K/UL — SIGNIFICANT CHANGE UP (ref 0–0.7)
EOSINOPHIL NFR BLD AUTO: 4 % — SIGNIFICANT CHANGE UP (ref 0–8)
GLUCOSE BLDC GLUCOMTR-MCNC: 113 MG/DL — HIGH (ref 70–99)
GLUCOSE BLDC GLUCOMTR-MCNC: 150 MG/DL — HIGH (ref 70–99)
GLUCOSE BLDC GLUCOMTR-MCNC: 248 MG/DL — HIGH (ref 70–99)
GLUCOSE BLDC GLUCOMTR-MCNC: 316 MG/DL — HIGH (ref 70–99)
GLUCOSE BLDC GLUCOMTR-MCNC: 50 MG/DL — LOW (ref 70–99)
GLUCOSE BLDC GLUCOMTR-MCNC: 56 MG/DL — LOW (ref 70–99)
GLUCOSE SERPL-MCNC: 48 MG/DL — LOW (ref 70–99)
HCT VFR BLD CALC: 31.6 % — LOW (ref 42–52)
HCT VFR BLD CALC: 32.3 % — LOW (ref 42–52)
HGB BLD-MCNC: 10.3 G/DL — LOW (ref 14–18)
HGB BLD-MCNC: 9.8 G/DL — LOW (ref 14–18)
IMM GRANULOCYTES NFR BLD AUTO: 0.2 % — SIGNIFICANT CHANGE UP (ref 0.1–0.3)
IRON SATN MFR SERPL: 13 % — LOW (ref 15–50)
IRON SATN MFR SERPL: 42 UG/DL — SIGNIFICANT CHANGE UP (ref 35–150)
LYMPHOCYTES # BLD AUTO: 1.39 K/UL — SIGNIFICANT CHANGE UP (ref 1.2–3.4)
LYMPHOCYTES # BLD AUTO: 16.2 % — LOW (ref 20.5–51.1)
MAGNESIUM SERPL-MCNC: 2.2 MG/DL — SIGNIFICANT CHANGE UP (ref 1.8–2.4)
MCHC RBC-ENTMCNC: 25.1 PG — LOW (ref 27–31)
MCHC RBC-ENTMCNC: 25.9 PG — LOW (ref 27–31)
MCHC RBC-ENTMCNC: 31 G/DL — LOW (ref 32–37)
MCHC RBC-ENTMCNC: 31.9 G/DL — LOW (ref 32–37)
MCV RBC AUTO: 81 FL — SIGNIFICANT CHANGE UP (ref 80–94)
MCV RBC AUTO: 81.4 FL — SIGNIFICANT CHANGE UP (ref 80–94)
MONOCYTES # BLD AUTO: 0.95 K/UL — HIGH (ref 0.1–0.6)
MONOCYTES NFR BLD AUTO: 11 % — HIGH (ref 1.7–9.3)
NEUTROPHILS # BLD AUTO: 5.84 K/UL — SIGNIFICANT CHANGE UP (ref 1.4–6.5)
NEUTROPHILS NFR BLD AUTO: 67.9 % — SIGNIFICANT CHANGE UP (ref 42.2–75.2)
NRBC # BLD: 0 /100 WBCS — SIGNIFICANT CHANGE UP (ref 0–0)
PLATELET # BLD AUTO: 252 K/UL — SIGNIFICANT CHANGE UP (ref 130–400)
PLATELET # BLD AUTO: 278 K/UL — SIGNIFICANT CHANGE UP (ref 130–400)
POTASSIUM SERPL-MCNC: 4 MMOL/L — SIGNIFICANT CHANGE UP (ref 3.5–5)
POTASSIUM SERPL-SCNC: 4 MMOL/L — SIGNIFICANT CHANGE UP (ref 3.5–5)
RBC # BLD: 3.9 M/UL — LOW (ref 4.7–6.1)
RBC # BLD: 3.97 M/UL — LOW (ref 4.7–6.1)
RBC # FLD: 15.9 % — HIGH (ref 11.5–14.5)
RBC # FLD: 15.9 % — HIGH (ref 11.5–14.5)
SODIUM SERPL-SCNC: 145 MMOL/L — SIGNIFICANT CHANGE UP (ref 135–146)
TIBC SERPL-MCNC: 325 UG/DL — SIGNIFICANT CHANGE UP (ref 220–430)
TRANSFERRIN SERPL-MCNC: 277 MG/DL — SIGNIFICANT CHANGE UP (ref 200–360)
TYPE + AB SCN PNL BLD: SIGNIFICANT CHANGE UP
UIBC SERPL-MCNC: 283 UG/DL — SIGNIFICANT CHANGE UP (ref 110–370)
WBC # BLD: 8.6 K/UL — SIGNIFICANT CHANGE UP (ref 4.8–10.8)
WBC # BLD: 9.23 K/UL — SIGNIFICANT CHANGE UP (ref 4.8–10.8)
WBC # FLD AUTO: 8.6 K/UL — SIGNIFICANT CHANGE UP (ref 4.8–10.8)
WBC # FLD AUTO: 9.23 K/UL — SIGNIFICANT CHANGE UP (ref 4.8–10.8)

## 2018-12-12 RX ORDER — FUROSEMIDE 40 MG
40 TABLET ORAL
Qty: 0 | Refills: 0 | Status: DISCONTINUED | OUTPATIENT
Start: 2018-12-12 | End: 2018-12-14

## 2018-12-12 RX ORDER — CHLORHEXIDINE GLUCONATE 213 G/1000ML
1 SOLUTION TOPICAL
Qty: 0 | Refills: 0 | Status: DISCONTINUED | OUTPATIENT
Start: 2018-12-12 | End: 2018-12-14

## 2018-12-12 RX ADMIN — Medication 81 MILLIGRAM(S): at 10:03

## 2018-12-12 RX ADMIN — PRASUGREL 10 MILLIGRAM(S): 5 TABLET, FILM COATED ORAL at 10:02

## 2018-12-12 RX ADMIN — Medication 40 MILLIGRAM(S): at 06:07

## 2018-12-12 RX ADMIN — Medication 40 MILLIGRAM(S): at 17:29

## 2018-12-12 RX ADMIN — ATORVASTATIN CALCIUM 80 MILLIGRAM(S): 80 TABLET, FILM COATED ORAL at 21:14

## 2018-12-12 NOTE — PROGRESS NOTE ADULT - SUBJECTIVE AND OBJECTIVE BOX
SUBJ:  HPI:  63y M w PMH CAD s/p angioplasty in  and MI in October s/p RCA stent complicated by stent thrombosis in October, HFrEF( EF 40%),DM (on insulin pump, Jardiance),DLD, Osteoarthritis presents here with chief complaint of SOB x 2 days, progressively worsening more with exertion, slightly relieved with rest, orthopnea, associated with dry cough. Also reports weight gain but couldn't measure exactly how much, denies fever, chills, chest pain, palpitations, abd pain, loose stools, nausea, vomiting, michael, hematochezia,headache, dizziness, heart burn, dysuria.  family history significant for mother  of heart problem at the age of 60, no siblings.    he said he was admitted in the hospital in November for SOB work up was negative for SOB, he was told that SOB was due to side effect of brilinta that was d/jovanni at that time he felt better after that.     In ED vitals: BP; 95/53, 93,98.2F,18, 96% on room air, Labs No inc WBC, Hb of 9.8 baseline (12), BNP 1855, trops 0.19, chest xray showed pulm vascular congestion, EKG normal sinus rhythm with left bundle branch block (not new). (11 Dec 2018 14:27)      MEDICATIONS  (STANDING):  aspirin enteric coated 81 milliGRAM(s) Oral daily  atorvastatin 80 milliGRAM(s) Oral at bedtime  DULoxetine 90 milliGRAM(s) Oral daily  furosemide    Tablet 40 milliGRAM(s) Oral daily  metoprolol succinate  milliGRAM(s) Oral daily  prasugrel 10 milliGRAM(s) Oral daily    MEDICATIONS  (PRN):            Vital Signs Last 24 Hrs  T(C): 37 (12 Dec 2018 05:50), Max: 37.2 (11 Dec 2018 19:37)  T(F): 98.6 (12 Dec 2018 05:50), Max: 99 (11 Dec 2018 19:37)  HR: 80 (12 Dec 2018 05:50) (80 - 99)  BP: 104/55 (12 Dec 2018 05:50) (95/53 - 134/60)  BP(mean): --  RR: 18 (12 Dec 2018 05:50) (18 - 20)  SpO2: 97% (11 Dec 2018 19:37) (96% - 97%)          PHYSICAL EXAM:  · CONSTITUTIONAL:	Well-developed, well nourished    BMI-  ·RESPIRATORY:   airway patent; breath sounds equal; good air movement; respirations non-labored; clear to auscultation bilaterally; no chest wall tenderness; no intercostal retractions; no rales,rhonchi or wheeze  · CARDIOVASCULAR	regular rate and rhythm  no rub  no murmur  normal PMI  · EXTREMITIES: No cyanosis, clubbing or edema  · VASCULAR: 	Equal and normal pulses (carotid, femoral, dorsalis pedis)  	  TELEMETRY: Sinus rhythm    ECG:      Ventricular Rate 87 BPM    Atrial Rate 87 BPM    P-R Interval 174 ms    QRS Duration 142 ms    Q-T Interval 380 ms    QTC Calculation(Bezet) 457 ms    P Axis 51 degrees    R Axis -25 degrees    T Axis 134 degrees    Diagnosis Line Normal sinus rhythm  Non-specific intra-ventricular conduction block  Cannot rule out Anterior infarct , age undetermined  T wave abnormality, consider lateral ischemia  Abnormal ECG    Confirmed by Ze Beebe ([id number]) on 2018 6:12:24 AM      TTE:    Pending    LABS:                        10.5   9.72  )-----------( 277      ( 11 Dec 2018 17:51 )             33.1     12-11    136  |  98  |  30<H>  ----------------------------<  379<H>  5.0   |  26  |  1.3    Ca    8.9      11 Dec 2018 07:41    TPro  6.2  /  Alb  3.9  /  TBili  1.1  /  DBili  x   /  AST  18  /  ALT  25  /  AlkPhos  78  12-11    CARDIAC MARKERS ( 11 Dec 2018 22:03 )  x     / 0.16 ng/mL / 512 U/L / x     / 9.3 ng/mL  CARDIAC MARKERS ( 11 Dec 2018 17:51 )  x     / 0.15 ng/mL / 553 U/L / x     / 10.3 ng/mL  CARDIAC MARKERS ( 11 Dec 2018 14:02 )  x     / 0.13 ng/mL / x     / x     / x      CARDIAC MARKERS ( 11 Dec 2018 07:41 )  x     / 0.19 ng/mL / x     / x     / x              I&O's Summary    11 Dec 2018 07:01  -  12 Dec 2018 07:00  --------------------------------------------------------  IN: 250 mL / OUT: 0 mL / NET: 250 mL      BNPSerum Pro-Brain Natriuretic Peptide: 1855 pg/mL ( @ 07:55)    RADIOLOGY & ADDITIONAL STUDIES:  < from: Transthoracic Echocardiogram (18 @ 08:20) >   1. Left ventricular ejection fraction, by visual estimation, is 35 to   40%.   2. Moderately decreased global left ventricular systolic function.   3. Multiple left ventricular regional wall motion abnormalities exist.   See wall motion findings.   4. Spectral Doppler shows pseudonormal pattern of left ventricular   myocardial filling (Grade II diastolic dysfunction).   5. Mild mitral regurgitation.    < end of copied text >    < from: Cardiac Cath Lab - Adult (10.12.18 @ 12:27) >  --  Intervention on OM1: stent, balloon angioplasty.    --  Intervention on OM1: percutaneous intervention.    < end of copied text >  < from: Cardiac Cath Lab - Adult (10.10.18 @ 04:36) >  -  Intervention on distal RCA: drug-eluting stent.    < end of copied text >

## 2018-12-12 NOTE — PROGRESS NOTE ADULT - SUBJECTIVE AND OBJECTIVE BOX
POST OPERATIVE PROCEDURAL DOCUMENTATION  PRE-OP DIAGNOSIS: CAD    POST-OP DIAGNOSIS: CAD    PROCEDURE:   LEFT HEART CATHERIZATION    Physician: Concepción AYNEZ  Assistant:  Shobha YANEZ    ANESTHESIA TYPE:  [x ] Sedation  [x ] Local/Regional  [  ]General Anesthesia    ESTIMATED BLOOD LOSS: < 10 ml         CONDITION  [x ] Good  [   ] Fair  [   ] Serious  [   ] Critical      SPECIMENS REMOVED (IF APPLICABLE):  n/a      IMPLANTS (IF APPLICABLE): none      FINDINGS:    LEFT HEART CATHERIZATION                          LVEDP: moderate to severe elevation    Coronary circulation: The coronary circulation is right dominant. There was significant 1-vessel coronary artery disease (LAD).LIMA to LAD was patent. Left main: Normal. LAD: The vessel was medium sized. Ostial LAD: There was a discrete 95 % stenosis. There was ARIANA grade 3 flow through the vessel (brisk flow). Proximal LAD: There was a diffuse 70 % stenosis at the site of a prior stent. There was ARIANA grade 3 flow through the vessel (brisk flow). Mid LAD: The vessel was small to medium sized. Angiography showed mild atherosclerosis with no flow limiting lesions. Distal LAD: The vessel was small to medium sized. Angiography showed mild atherosclerosis with no flow limiting lesions. The artery was supplied by a patent bypass graft. Circumflex: The vessel was medium sized. Proximal circumflex: Angiography showed mild atherosclerosis with no flow limiting lesions. Distal circumflex: The vessel was small to medium sized. Angiography showed minor luminal irregularities with no flow limiting lesions. 1st obtuse marginal: The vessel was medium sized. Prior stent was patent. There was a discrete 50- 60 % stenosis at a site with no prior intervention, at the ostium of the vessel segment, at the proximal margin of the stented segment. The lesion was hazy. There was ARIANA grade 3 flow through the vessel (brisk flow). RCA: The vessel was medium to large sized. Proximal RCA: Angiography showed no evidence of disease. Mid RCA: Angiography showed no evidence of disease. Distal RCA: The vessel was medium to large sized. There was a discrete 30- 40 % stenosis at the site of a prior stent. There was ARIANA grade 3 flow through the vessel (brisk flow). Graft to the LAD: The graft was a medium sized LIMA. Graft angiography showed no evidence of disease    PERCUTANEOUS CORONARY INTERVENTIONS: none      COMPLICATIONS:  none      POST-OP DIAGNOSIS: CAD            PLAN OF CARE      [ ] D/C Home today   [ ]  D/C in AM  [ x] Return to In-patient bed  [ ] Admit for observation  [ ] Return for staged procedure:  [ ] CT Surgery consult called  [x ]  Continue DAPT, B-blocker & Statin therapy  [x ]  Medical Therapy  [x ] Aggressive risk factor modification. The patient should follow a low fat and low calorie diet. Intensify diuretic therapy.

## 2018-12-13 ENCOUNTER — RX RENEWAL (OUTPATIENT)
Age: 63
End: 2018-12-13

## 2018-12-13 LAB
ANION GAP SERPL CALC-SCNC: 12 MMOL/L — SIGNIFICANT CHANGE UP (ref 7–14)
BUN SERPL-MCNC: 22 MG/DL — HIGH (ref 10–20)
CALCIUM SERPL-MCNC: 8.6 MG/DL — SIGNIFICANT CHANGE UP (ref 8.5–10.1)
CHLORIDE SERPL-SCNC: 100 MMOL/L — SIGNIFICANT CHANGE UP (ref 98–110)
CO2 SERPL-SCNC: 29 MMOL/L — SIGNIFICANT CHANGE UP (ref 17–32)
CREAT SERPL-MCNC: 1.3 MG/DL — SIGNIFICANT CHANGE UP (ref 0.7–1.5)
FERRITIN SERPL-MCNC: 73 NG/ML — SIGNIFICANT CHANGE UP (ref 30–400)
GLUCOSE BLDC GLUCOMTR-MCNC: 130 MG/DL — HIGH (ref 70–99)
GLUCOSE BLDC GLUCOMTR-MCNC: 140 MG/DL — HIGH (ref 70–99)
GLUCOSE BLDC GLUCOMTR-MCNC: 157 MG/DL — HIGH (ref 70–99)
GLUCOSE BLDC GLUCOMTR-MCNC: 61 MG/DL — LOW (ref 70–99)
GLUCOSE SERPL-MCNC: 99 MG/DL — SIGNIFICANT CHANGE UP (ref 70–99)
HCT VFR BLD CALC: 30.2 % — LOW (ref 42–52)
HGB BLD-MCNC: 9.5 G/DL — LOW (ref 14–18)
MCHC RBC-ENTMCNC: 25.3 PG — LOW (ref 27–31)
MCHC RBC-ENTMCNC: 31.5 G/DL — LOW (ref 32–37)
MCV RBC AUTO: 80.3 FL — SIGNIFICANT CHANGE UP (ref 80–94)
NRBC # BLD: 0 /100 WBCS — SIGNIFICANT CHANGE UP (ref 0–0)
PLATELET # BLD AUTO: 236 K/UL — SIGNIFICANT CHANGE UP (ref 130–400)
POTASSIUM SERPL-MCNC: 3.9 MMOL/L — SIGNIFICANT CHANGE UP (ref 3.5–5)
POTASSIUM SERPL-SCNC: 3.9 MMOL/L — SIGNIFICANT CHANGE UP (ref 3.5–5)
RBC # BLD: 3.76 M/UL — LOW (ref 4.7–6.1)
RBC # FLD: 15.8 % — HIGH (ref 11.5–14.5)
SODIUM SERPL-SCNC: 141 MMOL/L — SIGNIFICANT CHANGE UP (ref 135–146)
WBC # BLD: 8.62 K/UL — SIGNIFICANT CHANGE UP (ref 4.8–10.8)
WBC # FLD AUTO: 8.62 K/UL — SIGNIFICANT CHANGE UP (ref 4.8–10.8)

## 2018-12-13 RX ORDER — SACUBITRIL AND VALSARTAN 24; 26 MG/1; MG/1
1 TABLET, FILM COATED ORAL
Qty: 0 | Refills: 0 | Status: DISCONTINUED | OUTPATIENT
Start: 2018-12-13 | End: 2018-12-14

## 2018-12-13 RX ORDER — SACUBITRIL AND VALSARTAN 24; 26 MG/1; MG/1
1 TABLET, FILM COATED ORAL
Qty: 60 | Refills: 0
Start: 2018-12-13 | End: 2019-01-11

## 2018-12-13 RX ORDER — FUROSEMIDE 40 MG
20 TABLET ORAL ONCE
Qty: 0 | Refills: 0 | Status: COMPLETED | OUTPATIENT
Start: 2018-12-13 | End: 2018-12-13

## 2018-12-13 RX ADMIN — DULOXETINE HYDROCHLORIDE 90 MILLIGRAM(S): 30 CAPSULE, DELAYED RELEASE ORAL at 12:49

## 2018-12-13 RX ADMIN — Medication 40 MILLIGRAM(S): at 05:59

## 2018-12-13 RX ADMIN — Medication 40 MILLIGRAM(S): at 19:17

## 2018-12-13 RX ADMIN — SACUBITRIL AND VALSARTAN 1 TABLET(S): 24; 26 TABLET, FILM COATED ORAL at 10:15

## 2018-12-13 RX ADMIN — Medication 100 MILLIGRAM(S): at 05:59

## 2018-12-13 RX ADMIN — Medication 20 MILLIGRAM(S): at 07:40

## 2018-12-13 RX ADMIN — Medication 81 MILLIGRAM(S): at 12:49

## 2018-12-13 RX ADMIN — ATORVASTATIN CALCIUM 80 MILLIGRAM(S): 80 TABLET, FILM COATED ORAL at 21:05

## 2018-12-13 RX ADMIN — SACUBITRIL AND VALSARTAN 1 TABLET(S): 24; 26 TABLET, FILM COATED ORAL at 19:17

## 2018-12-13 RX ADMIN — PRASUGREL 10 MILLIGRAM(S): 5 TABLET, FILM COATED ORAL at 12:49

## 2018-12-13 NOTE — PROGRESS NOTE ADULT - SUBJECTIVE AND OBJECTIVE BOX
SUBJ:  HPI:  63y M w PMH CAD s/p angioplasty in  and MI in October s/p RCA stent complicated by stent thrombosis in October, HFrEF( EF 40%),DM (on insulin pump, Jardiance),DLD, Osteoarthritis presents here with chief complaint of SOB x 2 days, progressively worsening more with exertion, slightly relieved with rest, orthopnea, associated with dry cough. Also reports weight gain but couldn't measure exactly how much, denies fever, chills, chest pain, palpitations, abd pain, loose stools, nausea, vomiting, michael, hematochezia,headache, dizziness, heart burn, dysuria.  family history significant for mother  of heart problem at the age of 60, no siblings.    he said he was admitted in the hospital in November for SOB work up was negative for SOB, he was told that SOB was due to side effect of brilinta that was d/jovanni at that time he felt better after that.     In ED vitals: BP; 95/53, 93,98.2F,18, 96% on room air, Labs No inc WBC, Hb of 9.8 baseline (12), BNP 1855, trops 0.19, chest xray showed pulm vascular congestion, EKG normal sinus rhythm with left bundle branch block (not new). (11 Dec 2018 14:27)    Patient s/p cardiac cath yesterday.      MEDICATIONS  (STANDING):  aspirin enteric coated 81 milliGRAM(s) Oral daily  atorvastatin 80 milliGRAM(s) Oral at bedtime  chlorhexidine 4% Liquid 1 Application(s) Topical <User Schedule>  DULoxetine 90 milliGRAM(s) Oral daily  furosemide    Tablet 40 milliGRAM(s) Oral two times a day  furosemide   Injectable 20 milliGRAM(s) IV Push once  metoprolol succinate  milliGRAM(s) Oral daily  prasugrel 10 milliGRAM(s) Oral daily    MEDICATIONS  (PRN):            Vital Signs Last 24 Hrs  T(C): 36.6 (13 Dec 2018 06:13), Max: 37.2 (12 Dec 2018 19:49)  T(F): 97.8 (13 Dec 2018 06:13), Max: 98.9 (12 Dec 2018 19:49)  HR: 71 (13 Dec 2018 06:13) (71 - 93)  BP: 105/56 (13 Dec 2018 06:13) (105/56 - 120/60)  BP(mean): --  RR: 18 (13 Dec 2018 06:13) (15 - 19)  SpO2: 98% (12 Dec 2018 12:50) (98% - 98%)          PHYSICAL EXAM:  · CONSTITUTIONAL:	Well-developed, well nourished    BMI-  ·RESPIRATORY:  Bibasilar rales noted  · CARDIOVASCULAR	regular rate and rhythm  no rub  no murmur  normal PMI  · EXTREMITIES: No cyanosis, clubbing or edema  · VASCULAR: 	Equal and normal pulses (carotid, femoral, dorsalis pedis)  	  TELEMETRY: sinus rhythm    ECG:      Ventricular Rate 87 BPM    Atrial Rate 87 BPM    P-R Interval 174 ms    QRS Duration 142 ms    Q-T Interval 380 ms    QTC Calculation(Bezet) 457 ms    P Axis 51 degrees    R Axis -25 degrees    T Axis 134 degrees    Diagnosis Line Normal sinus rhythm  Non-specific intra-ventricular conduction block  Cannot rule out Anterior infarct , age undetermined  T wave abnormality, consider lateral ischemia  Abnormal ECG    Confirmed by Hermelindo Beebe ([id number]) on 2018 6:12:24 AM      TTE:       EXAM:  2-D ECHO (TTE) COMPLETE        PROCEDURE DATE:  2018      INTERPRETATION:  REPORT:    TRANSTHORACIC ECHOCARDIOGRAM REPORT         Patient Name:   FLOWER MARISCAL Accession #: [id number]  Medical Rec #:  [id number]         Height:      71.0 in 180.3 cm  YOB: 1955         Weight:      250.0 lb 113.40 kg  Patient Age:    63 years         BSA:         2.32 m²  Patient Gender: M                BP:          119/62 mmHg       Date of Exam:        2018 11:34:18 AM  Referring Physician: [id number] ED UNASSIGNED  Sonographer:         Adri marie  Fellow:              EVONNE Waller, ,  Reading Physician:   Hermelindo Beebe M.D.    Procedure:   2D Echo/Doppler/Color Doppler Complete.  Indications: I50.9 - Heart Failure, unspecified  Diagnosis:   I50.9 - Heart failure, unspecified         Summary:   1. Left ventricular ejection fraction, by visual estimation, is 40 to   45%.   2. Basal and mid inferior wall and basal and mid inferolateral wall are   abnormal as described above.   3. Mildly increased LV wall thickness.   4. Spectral Doppler shows pseudonormal pattern of left ventricular   myocardial filling (Grade II diastolic dysfunction).   5. Mild to moderate mitral valve regurgitation.   6. Mild tricuspid regurgitation.   7. Estimated pulmonary artery systolic pressure is 49.5 mmHg assuming a   right atrial pressure of 8 mmHg, which is consistent with mild pulmonary   hypertension.    PHYSICIAN INTERPRETATION:  Left Ventricle: The left ventricular internal cavity size is moderately   increased. Left ventricular wall thickness is mildly increased. There is   no left ventricular hypertrophy. Left ventricular ejection fraction, by   visual estimation, is 40 to 45%. Spectral Doppler shows pseudonormal   pattern of left ventricular myocardial filling (Grade II diastolic   dysfunction).       LV Wall Scoring:  The basal and mid inferior wall and basal and mid inferolateral wall are  hypokinetic. All remaining scored segments are normal.    Right Ventricle: Normal right ventricular size and function.  Left Atrium: Mildly enlarged left atrium.  Right Atrium: Normal right atrial size.  Pericardium: There is no evidence of pericardial effusion.  Mitral Valve: Mild to moderate mitral valve regurgitation is seen. No   evidence of mitral stenosis.  Tricuspid Valve: Structurally normal tricuspid valve, with normal leaflet   excursion. Mild tricuspid regurgitation is visualized. Estimated   pulmonary artery systolic pressure is 49.5 mmHg assuming a right atrial   pressure of 8 mmHg, which is consistent with mild pulmonary hypertension.  Aortic Valve: No evidence of aortic valve regurgitation is seen. No   evidence of aortic stenosis.  Pulmonic Valve: Structurally normal pulmonic valve, with normal leaflet   excursion. No indication of pulmonic valve regurgitation.  Aorta: The aortic root and ascending aorta are structurally normal, with   no evidence of dilitation.  Pulmonary Artery: The main pulmonary artery is normal in size.     2D AND M-MODE MEASUREMENTS (normal ranges within parentheses):  Left                 Normal    Aorta/Left           Normal  Ventricle:                     Atrium:  IVSd (2D):  1.28 cm  (0.7-1.1) AoV Cusp      2.31   (1.5-2.6)  LVPWd (2D): 1.28 cm  (0.7-1.1) Separation:   cm  LVIDd (2D): 5.64 cm  (3.4-5.7) Left Atrium   4.67   (1.9-4.0)  LVIDs (2D): 4.41 cm            (Mmode):      cm  LV FS (2D): 21.9 %   (>25%)    LA Volume     23.8  IVSd        1.22 cm  (0.7-1.1) Index         ml/m²  (Mmode):              Right  LVPWd       1.22 cm  (0.7-1.1) Ventricle:  (Mmode):                       RVd (2D):      2.71 cm  LVIDd       6.51 cm  (3.4-5.7)  (Mmode):  LVIDs       5.24 cm  (Mmode):  LV FS       19.4 %   (>25%)  (Mmode):  Relative    0.46 (<0.42)  Wall  Thickness  Rel. Wall   0.38     (<0.42)  Thickness  Mm  LV Mass     158.4  Index:      g/m²  Mmode    SPECTRAL DOPPLER ANALYSIS:  LV DIASTOLIC FUNCTION:  MV Peak E: 1.43 m/s Decel Time: 132 msec  MV Peak A: 0.60 m/s  E/A Ratio: 2.37    LVOT Vmax: 1.06 m/s  LVOT VTI:  0.21 m  LVOT Diam: 1.99 cm    Mitral Valve:  MV P1/2 Time: 38.35 msec  MV Area, PHT: 5.74 cm²    Tricuspid Valve and PA/RV Systolic Pressure: TR Max Velocity: 3.22 m/s RA   Pressure: 8 mmHg RVSP/PASP: 49.5 mmHg       [id number] Hermelindo Beebe M.D., Electronically signed on 2018 at 5:30:16   PM              *** Final ***                    HERMELINDO BEEBE MD  This document has been electronically signed. Dec 12 2018 11:34AM                  LABS:                        9.5    8.62  )-----------( 236      ( 13 Dec 2018 04:20 )             30.2     12-    141  |  100  |  22<H>  ----------------------------<  99  3.9   |  29  |  1.3    Ca    8.6      13 Dec 2018 04:20  Mg     2.2     12-12    TPro  6.2  /  Alb  3.9  /  TBili  1.1  /  DBili  x   /  AST  18  /  ALT  25  /  AlkPhos  78  12-11    CARDIAC MARKERS ( 11 Dec 2018 22:03 )  x     / 0.16 ng/mL / 512 U/L / x     / 9.3 ng/mL  CARDIAC MARKERS ( 11 Dec 2018 17:51 )  x     / 0.15 ng/mL / 553 U/L / x     / 10.3 ng/mL  CARDIAC MARKERS ( 11 Dec 2018 14:02 )  x     / 0.13 ng/mL / x     / x     / x      CARDIAC MARKERS ( 11 Dec 2018 07:41 )  x     / 0.19 ng/mL / x     / x     / x              I&O's Summary    BNP  RADIOLOGY & ADDITIONAL STUDIES:    Cardiac Cath    Progress Note:   · Provider Specialty	Cardiology	    Reason for Admission:   Reason for Admission:  · Reason for Admission	Post Cath	      · Subjective and Objective: 	                                              POST OPERATIVE PROCEDURAL DOCUMENTATION  PRE-OP DIAGNOSIS: CAD    POST-OP DIAGNOSIS: CAD    PROCEDURE:   LEFT HEART CATHERIZATION    Physician: Concepción YANEZ  Assistant:  Shobha YANEZ    ANESTHESIA TYPE:  [x ] Sedation  [x ] Local/Regional  [  ]General Anesthesia    ESTIMATED BLOOD LOSS: < 10 ml         CONDITION  [x ] Good  [   ] Fair  [   ] Serious  [   ] Critical      SPECIMENS REMOVED (IF APPLICABLE):  n/a      IMPLANTS (IF APPLICABLE): none      FINDINGS:    LEFT HEART CATHERIZATION                          LVEDP: moderate to severe elevation    Coronary circulation: The coronary circulation is right dominant. There was significant 1-vessel coronary artery disease (LAD).LIMA to LAD was patent. Left main: Normal. LAD: The vessel was medium sized. Ostial LAD: There was a discrete 95 % stenosis. There was ARIANA grade 3 flow through the vessel (brisk flow). Proximal LAD: There was a diffuse 70 % stenosis at the site of a prior stent. There was ARIANA grade 3 flow through the vessel (brisk flow). Mid LAD: The vessel was small to medium sized. Angiography showed mild atherosclerosis with no flow limiting lesions. Distal LAD: The vessel was small to medium sized. Angiography showed mild atherosclerosis with no flow limiting lesions. The artery was supplied by a patent bypass graft. Circumflex: The vessel was medium sized. Proximal circumflex: Angiography showed mild atherosclerosis with no flow limiting lesions. Distal circumflex: The vessel was small to medium sized. Angiography showed minor luminal irregularities with no flow limiting lesions. 1st obtuse marginal: The vessel was medium sized. Prior stent was patent. There was a discrete 50- 60 % stenosis at a site with no prior intervention, at the ostium of the vessel segment, at the proximal margin of the stented segment. The lesion was hazy. There was ARIANA grade 3 flow through the vessel (brisk flow). RCA: The vessel was medium to large sized. Proximal RCA: Angiography showed no evidence of disease. Mid RCA: Angiography showed no evidence of disease. Distal RCA: The vessel was medium to large sized. There was a discrete 30- 40 % stenosis at the site of a prior stent. There was ARIANA grade 3 flow through the vessel (brisk flow). Graft to the LAD: The graft was a medium sized LIMA. Graft angiography showed no evidence of disease    PERCUTANEOUS CORONARY INTERVENTIONS: none      COMPLICATIONS:  none      POST-OP DIAGNOSIS: CAD          PLAN of Care      [ ] D/C Home today   [ ]  D/C in AM  [ x] Return to In-patient bed  [ ] Admit for observation  [ ] Return for staged procedure:  [ ] CT Surgery consult called  [x ]  Continue DAPT, B-blocker & Statin therapy  [x ]  Medical Therapy  [x ] Aggressive risk factor modification. The patient should follow a low fat and low calorie diet. Intensify diuretic therapy.                  Electronic Signatures:  Sudhakar Yeager)  (Signed 12-Dec-2018 12:04)  	Authored: Progress Note, Reason for Admission, Subjective and Objective  Hermelindo Beebe)  (Signed 12-Dec-2018 15:06)  	Co-Signer: Progress Note, Reason for Admission, Subjective and Objective      Last Updated: 12-Dec-2018 15:06 by Hermelindo Beebe)

## 2018-12-13 NOTE — PROGRESS NOTE ADULT - SUBJECTIVE AND OBJECTIVE BOX
CC: SOB     HPI: SOB better. worse with exertion usually. weight gain also noted in the last 2 weeks by 20 pounds     ROS: 10 point ROS neg except what mentioned in HPI     Vital Signs Last 24 Hrs  T(C): 36.6 (13 Dec 2018 06:13), Max: 37.2 (12 Dec 2018 19:49)  T(F): 97.8 (13 Dec 2018 06:13), Max: 98.9 (12 Dec 2018 19:49)  HR: 71 (13 Dec 2018 06:13) (71 - 93)  BP: 105/56 (13 Dec 2018 06:13) (105/56 - 119/63)  BP(mean): --  RR: 18 (13 Dec 2018 06:13) (18 - 19)  SpO2: 94% (13 Dec 2018 08:17) (94% - 98%)    PHYSICAL EXAM:  GENERAL: NAD, well-developed  HEAD:  Atraumatic, Normocephalic  EYES: EOMI, PERRLA, conjunctiva and sclera clear  NECK: Supple, No JVD  Pulm: CTAB  CV: Regular rate and rhythm; No murmurs, rubs, or gallops  GI: Soft, Nontender, Nondistended; Bowel sounds present  EXTREMITIES:  trace edema B/L   PSYCH: AAOx3  NEUROLOGY: non-focal  SKIN: No rashes or lesions                          9.5    8.62  )-----------( 236      ( 13 Dec 2018 04:20 )             30.2     12-13    141  |  100  |  22<H>  ----------------------------<  99  3.9   |  29  |  1.3    Ca    8.6      13 Dec 2018 04:20  Mg     2.2     12-12          CARDIAC MARKERS ( 11 Dec 2018 22:03 )  x     / 0.16 ng/mL / 512 U/L / x     / 9.3 ng/mL  CARDIAC MARKERS ( 11 Dec 2018 17:51 )  x     / 0.15 ng/mL / 553 U/L / x     / 10.3 ng/mL  CARDIAC MARKERS ( 11 Dec 2018 14:02 )  x     / 0.13 ng/mL / x     / x     / x        MEDICATIONS  (STANDING):  aspirin enteric coated 81 milliGRAM(s) Oral daily  atorvastatin 80 milliGRAM(s) Oral at bedtime  chlorhexidine 4% Liquid 1 Application(s) Topical <User Schedule>  DULoxetine 90 milliGRAM(s) Oral daily  furosemide    Tablet 40 milliGRAM(s) Oral two times a day  metoprolol succinate  milliGRAM(s) Oral daily  prasugrel 10 milliGRAM(s) Oral daily  sacubitril 24 mG/valsartan 26 mG 1 Tablet(s) Oral two times a day    MEDICATIONS  (PRN):

## 2018-12-14 ENCOUNTER — TRANSCRIPTION ENCOUNTER (OUTPATIENT)
Age: 63
End: 2018-12-14

## 2018-12-14 VITALS
DIASTOLIC BLOOD PRESSURE: 60 MMHG | SYSTOLIC BLOOD PRESSURE: 98 MMHG | HEART RATE: 72 BPM | RESPIRATION RATE: 18 BRPM | OXYGEN SATURATION: 95 %

## 2018-12-14 LAB
GLUCOSE BLDC GLUCOMTR-MCNC: 230 MG/DL — HIGH (ref 70–99)
GLUCOSE BLDC GLUCOMTR-MCNC: 84 MG/DL — SIGNIFICANT CHANGE UP (ref 70–99)
HCT VFR BLD CALC: 33.1 % — LOW (ref 42–52)
HGB BLD-MCNC: 10.6 G/DL — LOW (ref 14–18)
MCHC RBC-ENTMCNC: 25.7 PG — LOW (ref 27–31)
MCHC RBC-ENTMCNC: 32 G/DL — SIGNIFICANT CHANGE UP (ref 32–37)
MCV RBC AUTO: 80.1 FL — SIGNIFICANT CHANGE UP (ref 80–94)
NRBC # BLD: 0 /100 WBCS — SIGNIFICANT CHANGE UP (ref 0–0)
PLATELET # BLD AUTO: 260 K/UL — SIGNIFICANT CHANGE UP (ref 130–400)
RBC # BLD: 4.13 M/UL — LOW (ref 4.7–6.1)
RBC # FLD: 15.7 % — HIGH (ref 11.5–14.5)
WBC # BLD: 9.63 K/UL — SIGNIFICANT CHANGE UP (ref 4.8–10.8)
WBC # FLD AUTO: 9.63 K/UL — SIGNIFICANT CHANGE UP (ref 4.8–10.8)

## 2018-12-14 RX ORDER — PRASUGREL 5 MG/1
1 TABLET, FILM COATED ORAL
Qty: 0 | Refills: 0 | DISCHARGE
Start: 2018-12-14

## 2018-12-14 RX ORDER — FUROSEMIDE 40 MG
1 TABLET ORAL
Qty: 0 | Refills: 0 | DISCHARGE
Start: 2018-12-14

## 2018-12-14 RX ORDER — FUROSEMIDE 40 MG
1 TABLET ORAL
Qty: 0 | Refills: 0 | COMMUNITY

## 2018-12-14 RX ORDER — ASPIRIN/CALCIUM CARB/MAGNESIUM 324 MG
1 TABLET ORAL
Qty: 0 | Refills: 0 | DISCHARGE
Start: 2018-12-14

## 2018-12-14 RX ADMIN — PRASUGREL 10 MILLIGRAM(S): 5 TABLET, FILM COATED ORAL at 12:14

## 2018-12-14 RX ADMIN — DULOXETINE HYDROCHLORIDE 90 MILLIGRAM(S): 30 CAPSULE, DELAYED RELEASE ORAL at 12:14

## 2018-12-14 RX ADMIN — Medication 100 MILLIGRAM(S): at 05:55

## 2018-12-14 RX ADMIN — Medication 40 MILLIGRAM(S): at 05:55

## 2018-12-14 RX ADMIN — SACUBITRIL AND VALSARTAN 1 TABLET(S): 24; 26 TABLET, FILM COATED ORAL at 05:55

## 2018-12-14 RX ADMIN — Medication 81 MILLIGRAM(S): at 12:13

## 2018-12-14 NOTE — DISCHARGE NOTE ADULT - CARE PROVIDER_API CALL
Mihir Lopez), Cardiovascular Disease; Internal Medicine; Interventional Cardiology  [address] [place] [address]  Phone: [phone]  Fax: [phone]

## 2018-12-14 NOTE — CHART NOTE - NSCHARTNOTEFT_GEN_A_CORE
<<<RESIDENT DISCHARGE NOTE>>>     FLOWER MARISCAL  [id number]    VITAL SIGNS:  T(F): 97.2 (12-14-18 @ 06:10), Max: 99 (12-13-18 @ 14:10)  HR: 80 (12-14-18 @ 06:10)  BP: 111/57 (12-14-18 @ 06:10)  SpO2: --      PHYSICAL EXAMINATION:  General: NAD, welldeveloped  Head & Neck: atraumaticnormocephalic  Pulmonary: normal air entry bilatral  Cardiovascular: normal s1, s2  Gastrointestinal/Abdomen & Pelvis:softnontender  Neurologic/Motor: nonfocal     TEST RESULTS:                        10.6   9.63  )-----------( 260      ( 14 Dec 2018 09:04 )             33.1       12-13    141  |  100  |  22<H>  ----------------------------<  99  3.9   |  29  |  1.3    Ca    8.6      13 Dec 2018 04:20        FINAL DISCHARGE INTERVIEW:  Resident(s) Present:Mihir ESPARZA MD    DISCHARGE MEDICATION RECONCILIATION  reviewed with Attending KIRBY     DISPOSITION:   Home, <<<RESIDENT DISCHARGE NOTE>>>     FLOWER MARISCAL  [id number]    VITAL SIGNS:  T(F): 97.2 (12-14-18 @ 06:10), Max: 99 (12-13-18 @ 14:10)  HR: 80 (12-14-18 @ 06:10)  BP: 111/57 (12-14-18 @ 06:10)  SpO2: --      PHYSICAL EXAMINATION:  General: NAD, welldeveloped  Head & Neck: atraumaticnormocephalic  Pulmonary: normal air entry bilatral  Cardiovascular: normal s1, s2  Gastrointestinal/Abdomen & Pelvis:softnontender  Neurologic/Motor: nonfocal     TEST RESULTS:                        10.6   9.63  )-----------( 260      ( 14 Dec 2018 09:04 )             33.1       12-13    141  |  100  |  22<H>  ----------------------------<  99  3.9   |  29  |  1.3    Ca    8.6      13 Dec 2018 04:20    stable for d/c  CHF action plan was discussed with the patient before discharge     FINAL DISCHARGE INTERVIEW:  Resident(s) Present:Mihir ESPARZA MD    DISCHARGE MEDICATION RECONCILIATION  reviewed with Attending KIRBY     DISPOSITION:   Home,

## 2018-12-14 NOTE — PROGRESS NOTE ADULT - SUBJECTIVE AND OBJECTIVE BOX
no chest pain   SOB better     Vital Signs Last 24 Hrs  T(C): 36.2 (14 Dec 2018 06:10), Max: 37.2 (13 Dec 2018 14:10)  T(F): 97.2 (14 Dec 2018 06:10), Max: 99 (13 Dec 2018 14:10)  HR: 80 (14 Dec 2018 06:10) (75 - 87)  BP: 111/57 (14 Dec 2018 06:10) (94/53 - 116/58)  BP(mean): --  RR: 20 (14 Dec 2018 06:10) (18 - 20)  SpO2: --    PHYSICAL EXAM:  GENERAL: NAD, well-developed  HEAD:  Atraumatic, Normocephalic  EYES: EOMI, PERRLA, conjunctiva and sclera clear  NECK: Supple, No JVD  Pulm: Clear to auscultation bilaterally; No wheeze  CV: Regular rate and rhythm; No murmurs, rubs, or gallops  GI: Soft, Nontender, Nondistended; Bowel sounds present  EXTREMITIES:  2+ Peripheral Pulses, No clubbing, cyanosis, or edema  PSYCH: AAOx3  NEUROLOGY: non-focal  SKIN: No rashes or lesions                          9.5    8.62  )-----------( 236      ( 13 Dec 2018 04:20 )             30.2     12-13    141  |  100  |  22<H>  ----------------------------<  99  3.9   |  29  |  1.3    Ca    8.6      13 Dec 2018 04:20

## 2018-12-14 NOTE — PROGRESS NOTE ADULT - ASSESSMENT
FLOWER MARISCAL 63y Male  MRN#: [id number]   CODE STATUS:FULLCODE      SUBJECTIVE  Patient is a 63y old Male who presents with a chief complaint of SOB on exertion, weight gain (13 Dec 2018 12:03)  Currently admitted to medicine with the primary diagnosis of Shortness of breath  Today is hospital day 2d, and this morning he is resting comfortably in bed and reports no overnight events.     OBJECTIVE  PAST MEDICAL & SURGICAL HISTORY  Diabetes  Blood clot due to device, implant, or graft: was on blood thinners  HLD (hyperlipidemia)  Chronic systolic CHF (congestive heart failure)  Osteoarthritis  History of surgery to heart and great vessels, presenting hazards to health: x 4 in 2005  Atherosclerosis of coronary artery: CAD (coronary artery disease)  Status post percutaneous transluminal coronary angioplasty: in 2012  Stented coronary artery  Other postprocedural status: Fixation hardware in foot    ALLERGIES:  ACE inhibitors (Hives)  enalapril (Hives)    MEDICATIONS:  STANDING MEDICATIONS  aspirin enteric coated 81 milliGRAM(s) Oral daily  atorvastatin 80 milliGRAM(s) Oral at bedtime  chlorhexidine 4% Liquid 1 Application(s) Topical <User Schedule>  DULoxetine 90 milliGRAM(s) Oral daily  furosemide    Tablet 40 milliGRAM(s) Oral two times a day  metoprolol succinate  milliGRAM(s) Oral daily  prasugrel 10 milliGRAM(s) Oral daily  sacubitril 24 mG/valsartan 26 mG 1 Tablet(s) Oral two times a day    PRN MEDICATIONS      VITAL SIGNS: Last 24 Hours  T(C): 37.2 (13 Dec 2018 14:10), Max: 37.2 (12 Dec 2018 19:49)  T(F): 99 (13 Dec 2018 14:10), Max: 99 (13 Dec 2018 14:10)  HR: 87 (13 Dec 2018 14:10) (71 - 87)  BP: 94/53 (13 Dec 2018 14:10) (94/53 - 110/58)  BP(mean): --  RR: 18 (13 Dec 2018 14:10) (18 - 19)  SpO2: 94% (13 Dec 2018 08:17) (94% - 94%)    LABS:                        9.5    8.62  )-----------( 236      ( 13 Dec 2018 04:20 )             30.2     12-13    141  |  100  |  22<H>  ----------------------------<  99  3.9   |  29  |  1.3    Ca    8.6      13 Dec 2018 04:20  Mg     2.2     12-12          CARDIAC MARKERS ( 11 Dec 2018 22:03 )  x     / 0.16 ng/mL / 512 U/L / x     / 9.3 ng/mL  CARDIAC MARKERS ( 11 Dec 2018 17:51 )  x     / 0.15 ng/mL / 553 U/L / x     / 10.3 ng/mL      RADIOLOGY:      PHYSICAL EXAM:  	GENERAL: NAD, not in distress  	HEAD:  Atraumatic, Normocephalic  	EYES: EOMI, PERRLA, conjunctiva and sclera clear  	CHEST/LUNG: Clear to auscultation bilaterally  	HEART: normal s1, s2  	ABDOMEN: Soft, Nontender, Nondistended;   	EXTREMITIES:  1+ edema stable,chronic  	PSYCH: AAOx3  	NEUROLOGY: non-focal              SKIN: No rashes or lesions      ASSESSMENT & PLAN  63y M w PMH CAD s/p angioplasty in 2012 and MI in October s/p RCA stent complicated by stent thrombosis in October, HFrEF( EF 40%),DM,DLD, Osteoarthritis presents here with chief complaint of SOB x 2 days was found to have elevated trops.      #)SOB due to chronic HFrEF   -BNP 1855  -Echo in November EF 35-40%, Grade II DD, mild MR  -repeat 2d echo, EF 45%,   -c/w home dose of lasix oral 40mg, extra 20 mg iv today  -c/w telemetry monitoring  -Strict I&o  -Daily weights  - start entresto 24/26 q 12     #)Elevated trops  Type 2 MI due to demand ischemia from exacerbation of CHF (and multiple medical factors ie anemia)  - s/p cath yesterday, no intervention.  -c/w aspirin, BB,     #)Anemia baseline Hb is 12-13  -stable, Trend CBC, active type and screen  -Denied any signs and symptoms of bleeding (No alaarm symptoms weight loss, dysphagia, melena, hematochezia, family history) takes motrin every day for pain one tablet no heart burn, GERD  - f/u occult blood stool       #)h/o CAD  c/w aspirin, effient, BB    #)DM on insulin pump and Jardiance at home, controlled   -Hold home meds  -check FS, If FS>180 resume inuslin  -c/w cymbalta    #)DLD  on crestor 20 at home changed to 80 at bedtime    #)Diet: CC diet  #)Activtiy: AMbulate with assistance  #)DVT ppx: SCD, until ruled out bleed  #)GI ppx: Not indicated  #)Dispo: From home  #)Code: Full
FLOWER MARISCAL 63y Male  MRN#: [id number]   CODE STATUS:FULLCODE      SUBJECTIVE  Patient is a 63y old Male who presents with a chief complaint of SOB on exertion, weight gain (12 Dec 2018 07:03)  Currently admitted to medicine with the primary diagnosis of Shortness of breath  Today is hospital day 1d, and this morning he is resting comfortably in bed and reports no overnight events.   denies any chest pain or sob at rest, only complaining that he cant sleep without more than one pillow as he feels dyspneic.    OBJECTIVE  PAST MEDICAL & SURGICAL HISTORY  Diabetes  Blood clot due to device, implant, or graft: was on blood thinners  HLD (hyperlipidemia)  Chronic systolic CHF (congestive heart failure)  Osteoarthritis  History of surgery to heart and great vessels, presenting hazards to health: x 4 in 2005  Atherosclerosis of coronary artery: CAD (coronary artery disease)  Status post percutaneous transluminal coronary angioplasty: in 2012  Stented coronary artery  Other postprocedural status: Fixation hardware in foot    ALLERGIES:  ACE inhibitors (Hives)  enalapril (Hives)    MEDICATIONS:  STANDING MEDICATIONS  aspirin enteric coated 81 milliGRAM(s) Oral daily  atorvastatin 80 milliGRAM(s) Oral at bedtime  chlorhexidine 4% Liquid 1 Application(s) Topical <User Schedule>  DULoxetine 90 milliGRAM(s) Oral daily  furosemide    Tablet 40 milliGRAM(s) Oral daily  metoprolol succinate  milliGRAM(s) Oral daily  prasugrel 10 milliGRAM(s) Oral daily    PRN MEDICATIONS      VITAL SIGNS: Last 24 Hours  T(C): 37 (12 Dec 2018 05:50), Max: 37.2 (11 Dec 2018 19:37)  T(F): 98.6 (12 Dec 2018 05:50), Max: 99 (11 Dec 2018 19:37)  HR: 80 (12 Dec 2018 05:50) (80 - 99)  BP: 104/55 (12 Dec 2018 05:50) (104/55 - 134/60)  BP(mean): --  RR: 18 (12 Dec 2018 05:50) (18 - 20)  SpO2: 97% (11 Dec 2018 19:37) (97% - 97%)    LABS:                        10.3   8.60  )-----------( 278      ( 12 Dec 2018 07:14 )             32.3     12-12    145  |  104  |  23<H>  ----------------------------<  48<L>  4.0   |  29  |  1.1    Ca    9.4      12 Dec 2018 07:14  Mg     2.2     12-12    TPro  6.2  /  Alb  3.9  /  TBili  1.1  /  DBili  x   /  AST  18  /  ALT  25  /  AlkPhos  78  12-11          Creatine Kinase, Serum: 512 U/L <H> (12-11-18 @ 22:03)  Troponin T, Serum: 0.16 ng/mL <HH> (12-11-18 @ 22:03)  Creatine Kinase, Serum: 553 U/L <H> (12-11-18 @ 17:51)  Troponin T, Serum: 0.15 ng/mL <HH> (12-11-18 @ 17:51)  Troponin T, Serum: 0.13 ng/mL <HH> (12-11-18 @ 14:02)      CARDIAC MARKERS ( 11 Dec 2018 22:03 )  x     / 0.16 ng/mL / 512 U/L / x     / 9.3 ng/mL  CARDIAC MARKERS ( 11 Dec 2018 17:51 )  x     / 0.15 ng/mL / 553 U/L / x     / 10.3 ng/mL  CARDIAC MARKERS ( 11 Dec 2018 14:02 )  x     / 0.13 ng/mL / x     / x     / x      CARDIAC MARKERS ( 11 Dec 2018 07:41 )  x     / 0.19 ng/mL / x     / x     / x          RADIOLOGY:      PHYSICAL EXAM:  	GENERAL: NAD, speaks in full sentences, no signs of respiratory distress  	HEAD:  Atraumatic, Normocephalic  	EYES: EOMI, PERRLA, conjunctiva and sclera clear  	NECK: Supple, No JVD  	CHEST/LUNG: Clear to auscultation bilaterally; No wheeze; No crackles; No accessory muscles used  	HEART: normal s1, s2  	ABDOMEN: Soft, Nontender, Nondistended; Bowel sounds present; No guarding  	EXTREMITIES:  1+ edema stable,chronic  	PSYCH: AAOx3  	NEUROLOGY: non-focal              SKIN: No rashes or lesions      ASSESSMENT & PLAN  63y M w PMH CAD s/p angioplasty in 2012 and MI in October s/p RCA stent complicated by stent thrombosis in October, HFrEF( EF 40%),DM,DLD, Osteoarthritis presents here with chief complaint of SOB x 2 days was found to have elevated trops.    #) NSTEMI/Elevated trops (HEART score 7) Risk of MACE is high  -First set 0.19, EKG showed normal sinus rhythm with LBBB old  - CE 0.19 > 0.13 > 0.16   - NPO, Cardiac Catheterization today   -c/w telemtry monitoring  -c/w aspirin, effient, BB, not on ACE inhibitor because of allergy  -Follow up with cardiology    #)SOB due to chronic HFrEF   -BNP 1855  -No signs of exacerbation on exam   -Echo in November EF 35-40%, Grade II DD, mild MR  -repeat 2d echo  -c/w home dose of lasix oral 40mg  -c/w telemetry monitoring  -Strict I&o  -Daily weights     #)Anemia baseline Hb is 12-13  -Today Hb is 9.8, Trend CBC, active type and screen  -Denied any signs and symptoms of bleeding (No alaarm symptoms weight loss, dysphagia, melena, hematochezia, family history) takes motrin every day for pain one tablet no heart burn, GERD  -KS done by ED negative, will do G  -Repeat Hb at 1600  -Follow up iron studies    #)h/o CAD  c/w aspirin, effient, BB    #)DM on insulin pump and Jardiance at home  -Hold home meds  -check FS, If FS>180 resume inuslin  -c/w cymbalta    #)DLD  on crestor 20 at home changed to 80 at bedtime    #)Diet: CC diet  #)Activtiy: AMbulate with assistance  #)DVT ppx: SCD, until ruled out bleed  #)GI ppx: Not indicated  #)Dispo: From home  #)Code: Full
#)SOB due to acute on  chronic HFrEF    IV lasix today times one then c/w po lasix   start entresto as per cardiology recs , BB  keep on tele per cardiology recs till tomorrow     #) NSTEMI type 2 demand ischemia  s/p cath showed CAD see cath report   per cardiology medical management     #)acute on chronic anemia: HGB has been stable since admission. baseline is 12  Rectal exam done in ED showed no melena and no red blood per rectum   no signs of active GIB   recheck CBC in am   OPT follow up for anemia work up     #)h/o CAD  c/w aspirin, effient, BB    #)DM on insulin pump and Jardiance at home  keep FS between 120-180     #)DLD  statin     #DVT PPX     discharge tomorrow if HGB remains stable
#)SOB due to acute on  chronic HFrEF    improved c/w po lasix   patient started on Entresto     #) NSTEMI type 2 demand ischemia  s/p cath showed CAD see cath report   per cardiology medical management     #)acute on chronic anemia: HGB has been stable since admission. baseline is 12  Rectal exam done in ED showed no melena and no red blood per rectum   no signs of active GIB   OPT follow up for anemia work up   check cbc again today     #)h/o CAD  c/w aspirin, effient, BB    #)DM on insulin pump and Jardiance at home  keep FS between 120-180     #)DLD  statin     #DVT PPX     discharge today pending CBC   spent 32 min coordinating discharge
· Assessment		  63 yom with pmh of hfref, cad, chf, copd, ckd, dld, dm, htn p/w cc of sob since 2 days ptp; pt complaints of gaining weight since 2 weeks; he usually weighs around 250 and now is 270 Lb; he is found to have anemia of 9.8 lower than his baseline which is around 12.3 and elevated troponins    # Elevated troponin/ NSTEMI  # HFrEF  # H/o CAD  # Acute Anemia    - Cardiac Catheterization  - c/w telemetry monitoring  - f/u TTE to assess LVEF  - c/w ASA, effient, BB and other cardiac medications.  - do not initiate ACE inhibitors due to allergy (generalized rash). Cannot tolerate ARB/Entresto due to hypotension currently.  - r/o lower GI bleed and other possible sources of bleeding in light of acute anemia  - consider cardiac angiogram once bleeding possibility is ruled out.  - call Cardiac fellow if any change in patient's clinical status.
· Assessment		  63 yom with pmh of hfref, cad, chf, copd, ckd, dld, dm, htn p/w cc of sob since 2 days ptp; pt complaints of gaining weight since 2 weeks; he usually weighs around 250 and now is 270 Lb; he is found to have anemia of 9.8 lower than his baseline which is around 12.3 and elevated troponins    # Elevated troponin/ Type 2 MI due to demand ischemia from exacerbation of CHF (and multiple medical factors ie anemia)  # HFrEF - acute/chronic combined systolic/diastolic CHF due to ischemic heart disease  # H/o CAD  # Acute Anemia    - c/w telemetry monitoring  - c/w ASA, effient, BB and other cardiac medications.  - c/w furosemide 40 mg po od and give additional 20 mg ivp this AM  - start sacubitral/valsartan 24/26 mg po q 12 h.  - r/o lower GI bleed and other possible sources of bleeding in light of acute anemia  - Possible D/C in AM if stable  - No ICD necessary at this time  - call Cardiac fellow if any change in patient's clinical status.
· Assessment		  63 yom with pmh of hfref, cad, chf, copd, ckd, dld, dm, htn p/w cc of sob since 2 days ptp; pt complaints of gaining weight since 2 weeks; he usually weighs around 250 and now is 270 Lb; he is found to have anemia of 9.8 lower than his baseline which is around 12.3 and elevated troponins    # Elevated troponin/ Type 2 MI due to demand ischemia from exacerbation of CHF (and multiple medical factors ie anemia)  # HFrEF - acute/chronic combined systolic/diastolic CHF due to ischemic heart disease  # H/o CAD  # Acute Anemia    - d/c telemetry monitoring  - c/w ASA, effient, BB and other cardiac medications.  - c/w furosemide 40 mg po od  - sacubitral/valsartan 24/26 mg po q 12 h.  - D/C today  - f/u Dr. Lopez for outpatient   - No ICD necessary at this time  - call Cardiac fellow if any change in patient's clinical status.

## 2018-12-14 NOTE — DISCHARGE NOTE ADULT - MEDICATION SUMMARY - MEDICATIONS TO TAKE
I will START or STAY ON the medications listed below when I get home from the hospital:    aspirin 81 mg oral delayed release tablet  -- 1 tab(s) by mouth once a day  -- Indication: For coronary artery disease    sacubitril-valsartan 24 mg-26 mg oral tablet  -- 1 tab(s) by mouth 2 times a day  -- Indication: For Heart failure     Cymbalta  -- 90 milligram(s) by mouth once a day  -- Indication: For depression    Jardiance 10 mg oral tablet  -- 1 tab(s) by mouth once a day (in the morning)  -- Indication: For diabetes    insulin  -- Indication: For diabetes    Crestor 20 mg oral tablet  -- 1 tab(s) by mouth once a day (at bedtime)  -- Indication: For hyperlipidemia    Zetia 10 mg oral tablet  -- 1 tab(s) by mouth once a day  -- Indication: For hyperlipidemia    prasugrel 10 mg oral tablet  -- 1 tab(s) by mouth once a day  -- Indication: For coronary artery disease    Metoprolol Succinate  mg oral tablet, extended release  -- 1 tab(s) by mouth once a day  -- Indication: For coronary artery disease    furosemide 40 mg oral tablet  -- 1 tab(s) by mouth 2 times a day  -- Indication: For heart failure    Omega-3 oral capsule  -- 1 tab(s) by mouth once a day  -- Indication: For omega 3

## 2018-12-14 NOTE — DISCHARGE NOTE ADULT - CARE PLAN
Principal Discharge DX:	Chronic systolic CHF (congestive heart failure)  Goal:	treatment of exacerbation  Assessment and plan of treatment:	you received treatement for chaudhari failure exacerbation.   we added a new medication to your heart failure medications; ( Entresto ).   please follow the recommendation as discussed in the CHF action plan.   return to ED if you complains of one of the following: Chest pain, shortness of breath, or palpitations.  - follow up with your cardiologist in one week.  Secondary Diagnosis:	Elevated troponin  Assessment and plan of treatment:	your elevated cardiac enzymes was due to volume overload status secondary to the decompensated heart failure. As shown by the cardiac cath; no new ischemic events requires intervention... as per your cardiologist.

## 2018-12-14 NOTE — DISCHARGE NOTE ADULT - PLAN OF CARE
treatment of exacerbation you received treatement for chaudhari failure exacerbation.   we added a new medication to your heart failure medications; ( Entresto ).   please follow the recommendation as discussed in the CHF action plan.   return to ED if you complains of one of the following: Chest pain, shortness of breath, or palpitations.  - follow up with your cardiologist in one week. your elevated cardiac enzymes was due to volume overload status secondary to the decompensated heart failure. As shown by the cardiac cath; no new ischemic events requires intervention... as per your cardiologist.

## 2018-12-14 NOTE — PROGRESS NOTE ADULT - SUBJECTIVE AND OBJECTIVE BOX
SUBJ:  HPI:  63y M w PMH CAD s/p angioplasty in  and MI in October s/p RCA stent complicated by stent thrombosis in October, HFrEF( EF 40%),DM (on insulin pump, Jardiance),DLD, Osteoarthritis presents here with chief complaint of SOB x 2 days, progressively worsening more with exertion, slightly relieved with rest, orthopnea, associated with dry cough. Also reports weight gain but couldn't measure exactly how much, denies fever, chills, chest pain, palpitations, abd pain, loose stools, nausea, vomiting, michael, hematochezia,headache, dizziness, heart burn, dysuria.  family history significant for mother  of heart problem at the age of 60, no siblings.    he said he was admitted in the hospital in November for SOB work up was negative for SOB, he was told that SOB was due to side effect of brilinta that was d/jovanni at that time he felt better after that.     In ED vitals: BP; 95/53, 93,98.2F,18, 96% on room air, Labs No inc WBC, Hb of 9.8 baseline (12), BNP 1855, trops 0.19, chest xray showed pulm vascular congestion, EKG normal sinus rhythm with left bundle branch block (not new). (11 Dec 2018 14:27)    Patient feeling much improved today.    MEDICATIONS  (STANDING):  aspirin enteric coated 81 milliGRAM(s) Oral daily  atorvastatin 80 milliGRAM(s) Oral at bedtime  chlorhexidine 4% Liquid 1 Application(s) Topical <User Schedule>  DULoxetine 90 milliGRAM(s) Oral daily  furosemide    Tablet 40 milliGRAM(s) Oral two times a day  metoprolol succinate  milliGRAM(s) Oral daily  prasugrel 10 milliGRAM(s) Oral daily  sacubitril 24 mG/valsartan 26 mG 1 Tablet(s) Oral two times a day    MEDICATIONS  (PRN):            Vital Signs Last 24 Hrs  T(C): 36.2 (14 Dec 2018 06:10), Max: 37.2 (13 Dec 2018 14:10)  T(F): 97.2 (14 Dec 2018 06:10), Max: 99 (13 Dec 2018 14:10)  HR: 80 (14 Dec 2018 06:10) (75 - 87)  BP: 111/57 (14 Dec 2018 06:10) (94/53 - 116/58)  BP(mean): --  RR: 20 (14 Dec 2018 06:10) (18 - 20)  SpO2: --          PHYSICAL EXAM:  · CONSTITUTIONAL:	Well-developed, well nourished    BMI-  ·RESPIRATORY:   airway patent; breath sounds equal; good air movement; respirations non-labored; clear to auscultation bilaterally; no chest wall tenderness; no intercostal retractions; no rales,rhonchi or wheeze  · CARDIOVASCULAR	regular rate and rhythm  no rub  no murmur  normal PMI  · EXTREMITIES: No cyanosis, clubbing or edema  · VASCULAR: 	Equal and normal pulses (carotid, femoral, dorsalis pedis)  	  TELEMETRY: sinus rhythm    ECG:      Ventricular Rate 87 BPM    Atrial Rate 87 BPM    P-R Interval 174 ms    QRS Duration 142 ms    Q-T Interval 380 ms    QTC Calculation(Bezet) 457 ms    P Axis 51 degrees    R Axis -25 degrees    T Axis 134 degrees    Diagnosis Line Normal sinus rhythm  Non-specific intra-ventricular conduction block  Cannot rule out Anterior infarct , age undetermined  T wave abnormality, consider lateral ischemia  Abnormal ECG    Confirmed by Hermelindo Beebe ([id number]) on 2018 6:12:24 AM      TTE:       EXAM:  2-D ECHO (TTE) COMPLETE        PROCEDURE DATE:  2018      INTERPRETATION:  REPORT:    TRANSTHORACIC ECHOCARDIOGRAM REPORT         Patient Name:   FLOWER MARISCAL Accession #: [id number]  Medical Rec #:  [id number]         Height:      71.0 in 180.3 cm  YOB: 1955         Weight:      250.0 lb 113.40 kg  Patient Age:    63 years         BSA:         2.32 m²  Patient Gender: M                BP:          119/62 mmHg       Date of Exam:        2018 11:34:18 AM  Referring Physician: [id number] ED UNASSIGNED  Sonographer:         Adri marie  Fellow:              EVONNE Waller, ,  Reading Physician:   Hermelindo Beebe M.D.    Procedure:   2D Echo/Doppler/Color Doppler Complete.  Indications: I50.9 - Heart Failure, unspecified  Diagnosis:   I50.9 - Heart failure, unspecified         Summary:   1. Left ventricular ejection fraction, by visual estimation, is 40 to   45%.   2. Basal and mid inferior wall and basal and mid inferolateral wall are   abnormal as described above.   3. Mildly increased LV wall thickness.   4. Spectral Doppler shows pseudonormal pattern of left ventricular   myocardial filling (Grade II diastolic dysfunction).   5. Mild to moderate mitral valve regurgitation.   6. Mild tricuspid regurgitation.   7. Estimated pulmonary artery systolic pressure is 49.5 mmHg assuming a   right atrial pressure of 8 mmHg, which is consistent with mild pulmonary   hypertension.    PHYSICIAN INTERPRETATION:  Left Ventricle: The left ventricular internal cavity size is moderately   increased. Left ventricular wall thickness is mildly increased. There is   no left ventricular hypertrophy. Left ventricular ejection fraction, by   visual estimation, is 40 to 45%. Spectral Doppler shows pseudonormal   pattern of left ventricular myocardial filling (Grade II diastolic   dysfunction).       LV Wall Scoring:  The basal and mid inferior wall and basal and mid inferolateral wall are  hypokinetic. All remaining scored segments are normal.    Right Ventricle: Normal right ventricular size and function.  Left Atrium: Mildly enlarged left atrium.  Right Atrium: Normal right atrial size.  Pericardium: There is no evidence of pericardial effusion.  Mitral Valve: Mild to moderate mitral valve regurgitation is seen. No   evidence of mitral stenosis.  Tricuspid Valve: Structurally normal tricuspid valve, with normal leaflet   excursion. Mild tricuspid regurgitation is visualized. Estimated   pulmonary artery systolic pressure is 49.5 mmHg assuming a right atrial   pressure of 8 mmHg, which is consistent with mild pulmonary hypertension.  Aortic Valve: No evidence of aortic valve regurgitation is seen. No   evidence of aortic stenosis.  Pulmonic Valve: Structurally normal pulmonic valve, with normal leaflet   excursion. No indication of pulmonic valve regurgitation.  Aorta: The aortic root and ascending aorta are structurally normal, with   no evidence of dilitation.  Pulmonary Artery: The main pulmonary artery is normal in size.     2D AND M-MODE MEASUREMENTS (normal ranges within parentheses):  Left                 Normal    Aorta/Left           Normal  Ventricle:                     Atrium:  IVSd (2D):  1.28 cm  (0.7-1.1) AoV Cusp      2.31   (1.5-2.6)  LVPWd (2D): 1.28 cm  (0.7-1.1) Separation:   cm  LVIDd (2D): 5.64 cm  (3.4-5.7) Left Atrium   4.67   (1.9-4.0)  LVIDs (2D): 4.41 cm            (Mmode):      cm  LV FS (2D): 21.9 %   (>25%)    LA Volume     23.8  IVSd        1.22 cm  (0.7-1.1) Index         ml/m²  (Mmode):              Right  LVPWd       1.22 cm  (0.7-1.1) Ventricle:  (Mmode):                       RVd (2D):      2.71 cm  LVIDd       6.51 cm  (3.4-5.7)  (Mmode):  LVIDs       5.24 cm  (Mmode):  LV FS       19.4 %   (>25%)  (Mmode):  Relative    0.46 (<0.42)  Wall  Thickness  Rel. Wall   0.38     (<0.42)  Thickness  Mm  LV Mass     158.4  Index:      g/m²  Mmode    SPECTRAL DOPPLER ANALYSIS:  LV DIASTOLIC FUNCTION:  MV Peak E: 1.43 m/s Decel Time: 132 msec  MV Peak A: 0.60 m/s  E/A Ratio: 2.37    LVOT Vmax: 1.06 m/s  LVOT VTI:  0.21 m  LVOT Diam: 1.99 cm    Mitral Valve:  MV P1/2 Time: 38.35 msec  MV Area, PHT: 5.74 cm²    Tricuspid Valve and PA/RV Systolic Pressure: TR Max Velocity: 3.22 m/s RA   Pressure: 8 mmHg RVSP/PASP: 49.5 mmHg       [id number] Hermelindo Beebe M.D., Electronically signed on 2018 at 5:30:16   PM              *** Final ***                    HERMELINDO BEEBE MD  This document has been electronically signed. Dec 12 2018 11:34AM                  LABS:                        9.5    8.62  )-----------( 236      ( 13 Dec 2018 04:20 )             30.2         141  |  100  |  22<H>  ----------------------------<  99  3.9   |  29  |  1.3    Ca    8.6      13 Dec 2018 04:20              I&O's Summary    13 Dec 2018 07:01  -  14 Dec 2018 07:00  --------------------------------------------------------  IN: 940 mL / OUT: 600 mL / NET: 340 mL      BNP  RADIOLOGY & ADDITIONAL STUDIES:          Cardiac Cath        FINDINGS:    LEFT HEART CATHERIZATION                          LVEDP: moderate to severe elevation    Coronary circulation: The coronary circulation is right dominant. There was significant 1-vessel coronary artery disease (LAD).LIMA to LAD was patent. Left main: Normal. LAD: The vessel was medium sized. Ostial LAD: There was a discrete 95 % stenosis. There was ARIANA grade 3 flow through the vessel (brisk flow). Proximal LAD: There was a diffuse 70 % stenosis at the site of a prior stent. There was ARIANA grade 3 flow through the vessel (brisk flow). Mid LAD: The vessel was small to medium sized. Angiography showed mild atherosclerosis with no flow limiting lesions. Distal LAD: The vessel was small to medium sized. Angiography showed mild atherosclerosis with no flow limiting lesions. The artery was supplied by a patent bypass graft. Circumflex: The vessel was medium sized. Proximal circumflex: Angiography showed mild atherosclerosis with no flow limiting lesions. Distal circumflex: The vessel was small to medium sized. Angiography showed minor luminal irregularities with no flow limiting lesions. 1st obtuse marginal: The vessel was medium sized. Prior stent was patent. There was a discrete 50- 60 % stenosis at a site with no prior intervention, at the ostium of the vessel segment, at the proximal margin of the stented segment. The lesion was hazy. There was ARIANA grade 3 flow through the vessel (brisk flow). RCA: The vessel was medium to large sized. Proximal RCA: Angiography showed no evidence of disease. Mid RCA: Angiography showed no evidence of disease. Distal RCA: The vessel was medium to large sized. There was a discrete 30- 40 % stenosis at the site of a prior stent. There was ARIANA grade 3 flow through the vessel (brisk flow). Graft to the LAD: The graft was a medium sized LIMA. Graft angiography showed no evidence of disease    PERCUTANEOUS CORONARY INTERVENTIONS: none      COMPLICATIONS:  none      POST-OP DIAGNOSIS: CAD          PLAN of Care      [ ] D/C Home today   [ ]  D/C in AM  [ x] Return to In-patient bed  [ ] Admit for observation  [ ] Return for staged procedure:  [ ] CT Surgery consult called  [x ]  Continue DAPT, B-blocker & Statin therapy  [x ]  Medical Therapy  [x ] Aggressive risk factor modification. The patient should follow a low fat and low calorie diet. Intensify diuretic therapy.                  Electronic Signatures:  Sudhakar Yeager)  (Signed 12-Dec-2018 12:04)  	Authored: Progress Note, Reason for Admission, Subjective and Objective  Hermelindo Beebe)  (Signed 12-Dec-2018 15:06)  	Co-Signer: Progress Note, Reason for Admission, Subjective and Objective      Last Updated: 12-Dec-2018 15:06 by Hermelindo Beebe)

## 2018-12-14 NOTE — DISCHARGE NOTE ADULT - HOSPITAL COURSE
63y M w PMH CAD s/p angioplasty in 2012 and MI in October s/p RCA stent complicated by stent thrombosis in October, HFrEF( EF 40%),DM (on insulin pump, Jardiance),DLD, Osteoarthritis presents here with chief complaint of SOB x 2 days, found to have + troponin, s/p cath, no intervention was done, cardiology impression was demand ischemia 2/2 decompensated heart failure, patient received lasix, entresto added to hi medications, upon discharge symptoms resolved, denies any chest pain, sob , orthopnoea or pnd.   will follow up in one week   stable for discharge

## 2018-12-14 NOTE — PROGRESS NOTE ADULT - REASON FOR ADMISSION
Post Cath
SOB on exertion, weight gain

## 2018-12-14 NOTE — DISCHARGE NOTE ADULT - PATIENT PORTAL LINK FT
You can access the [facility] Portal, offered by [facility], by registering with the following website: [email or web address]

## 2018-12-18 ENCOUNTER — CHART COPY (OUTPATIENT)
Age: 63
End: 2018-12-18

## 2018-12-18 ENCOUNTER — APPOINTMENT (OUTPATIENT)
Dept: CARDIOTHORACIC SURGERY | Facility: CLINIC | Age: 63
End: 2018-12-18

## 2018-12-18 DIAGNOSIS — Z95.5 PRESENCE OF CORONARY ANGIOPLASTY IMPLANT AND GRAFT: ICD-10-CM

## 2018-12-18 DIAGNOSIS — D64.9 ANEMIA, UNSPECIFIED: ICD-10-CM

## 2018-12-18 DIAGNOSIS — J44.9 CHRONIC OBSTRUCTIVE PULMONARY DISEASE, UNSPECIFIED: ICD-10-CM

## 2018-12-18 DIAGNOSIS — I50.43 ACUTE ON CHRONIC COMBINED SYSTOLIC (CONGESTIVE) AND DIASTOLIC (CONGESTIVE) HEART FAILURE: ICD-10-CM

## 2018-12-18 DIAGNOSIS — T82.855A STENOSIS OF CORONARY ARTERY STENT, INITIAL ENCOUNTER: ICD-10-CM

## 2018-12-18 DIAGNOSIS — I21.A1 MYOCARDIAL INFARCTION TYPE 2: ICD-10-CM

## 2018-12-18 DIAGNOSIS — N18.2 CHRONIC KIDNEY DISEASE, STAGE 2 (MILD): ICD-10-CM

## 2018-12-18 DIAGNOSIS — E78.5 HYPERLIPIDEMIA, UNSPECIFIED: ICD-10-CM

## 2018-12-18 DIAGNOSIS — I95.9 HYPOTENSION, UNSPECIFIED: ICD-10-CM

## 2018-12-18 DIAGNOSIS — M19.90 UNSPECIFIED OSTEOARTHRITIS, UNSPECIFIED SITE: ICD-10-CM

## 2018-12-18 DIAGNOSIS — I25.10 ATHEROSCLEROTIC HEART DISEASE OF NATIVE CORONARY ARTERY WITHOUT ANGINA PECTORIS: ICD-10-CM

## 2018-12-18 DIAGNOSIS — R06.02 SHORTNESS OF BREATH: ICD-10-CM

## 2018-12-18 DIAGNOSIS — Z82.49 FAMILY HISTORY OF ISCHEMIC HEART DISEASE AND OTHER DISEASES OF THE CIRCULATORY SYSTEM: ICD-10-CM

## 2018-12-18 DIAGNOSIS — I44.7 LEFT BUNDLE-BRANCH BLOCK, UNSPECIFIED: ICD-10-CM
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
## 2018-12-18 DIAGNOSIS — E11.22 TYPE 2 DIABETES MELLITUS WITH DIABETIC CHRONIC KIDNEY DISEASE: ICD-10-CM

## 2018-12-18 DIAGNOSIS — Z87.891 PERSONAL HISTORY OF NICOTINE DEPENDENCE: ICD-10-CM

## 2018-12-18 DIAGNOSIS — Z79.4 LONG TERM (CURRENT) USE OF INSULIN: ICD-10-CM

## 2018-12-18 DIAGNOSIS — I25.2 OLD MYOCARDIAL INFARCTION: ICD-10-CM

## 2018-12-18 DIAGNOSIS — Z79.82 LONG TERM (CURRENT) USE OF ASPIRIN: ICD-10-CM

## 2018-12-18 DIAGNOSIS — I13.0 HYPERTENSIVE HEART AND CHRONIC KIDNEY DISEASE WITH HEART FAILURE AND STAGE 1 THROUGH STAGE 4 CHRONIC KIDNEY DISEASE, OR UNSPECIFIED CHRONIC KIDNEY DISEASE: ICD-10-CM

## 2018-12-18 DIAGNOSIS — Z46.81 ENCOUNTER FOR FITTING AND ADJUSTMENT OF INSULIN PUMP: ICD-10-CM

## 2018-12-26 RX ORDER — HYDRALAZINE HYDROCHLORIDE 100 MG/1
100 TABLET ORAL
Qty: 180 | Refills: 0 | Status: DISCONTINUED | COMMUNITY
End: 2018-12-26

## 2018-12-26 RX ORDER — TICAGRELOR 90 MG/1
90 TABLET ORAL TWICE DAILY
Qty: 180 | Refills: 0 | Status: DISCONTINUED | COMMUNITY
End: 2018-12-26

## 2019-01-04 ENCOUNTER — APPOINTMENT (OUTPATIENT)
Dept: HEART AND VASCULAR | Facility: CLINIC | Age: 64
End: 2019-01-04

## 2019-01-08 ENCOUNTER — OTHER (OUTPATIENT)
Age: 64
End: 2019-01-08

## 2019-01-08 ENCOUNTER — RESULT CHARGE (OUTPATIENT)
Age: 64
End: 2019-01-08

## 2019-01-10 ENCOUNTER — RX RENEWAL (OUTPATIENT)
Age: 64
End: 2019-01-10

## 2019-01-14 ENCOUNTER — TRANSCRIPTION ENCOUNTER (OUTPATIENT)
Age: 64
End: 2019-01-14

## 2019-01-17 ENCOUNTER — RX RENEWAL (OUTPATIENT)
Age: 64
End: 2019-01-17

## 2019-03-06 ENCOUNTER — RX RENEWAL (OUTPATIENT)
Age: 64
End: 2019-03-06

## 2019-03-22 ENCOUNTER — APPOINTMENT (OUTPATIENT)
Dept: ORTHOPEDIC SURGERY | Facility: CLINIC | Age: 64
End: 2019-03-22
Payer: COMMERCIAL

## 2019-03-22 PROCEDURE — 20610 DRAIN/INJ JOINT/BURSA W/O US: CPT | Mod: LT

## 2019-03-22 RX ADMIN — Medication 1 MG/2ML: at 00:00

## 2019-03-22 NOTE — ADDENDUM
[FreeTextEntry1] : This note was written by Josefina Urban on 03/22/2019  acting as scribe for Dr. Estes and PACHECO Iraheta.\par \par Dr. Estes was physically present during the key portions the encounter, provided assistance as needed, and formulated the assessment and plan as documented above.\par \par

## 2019-03-22 NOTE — PROCEDURE
[Injection] : Injection [Bilateral] : of bilateral [Knee Joint] : knee joint [Effusion] : Effusion [Osteoarthritis] : Osteoarthritis [Inflammation] : Inflammation [Joint Pain] : Joint pain [Patient] : patient [Risk] : risk [Benefits] : benefits [Alternatives] : alternatives [Bleeding] : bleeding [Infection] : infection [Allergic Reaction] : allergic reaction [Verbal Consent Obtained] : verbal consent was obtained prior to the procedure [Alcohol] : Alcohol [Betadine] : Betadine [Ethyl Chloride Spray] : ethyl chloride spray was used as a topical anesthetic [18] : an 18-gauge [2 mL Euflexxa___(lot #)] : 2mL Euflexxa ~Ulot# [unfilled] [Bandage Applied] : a bandage [Tolerated Well] : The patient tolerated the procedure well [None] : none [No Strenuous Activity___day(s)] : avoid strenuous activity for [unfilled] day(s) [___ Week(s)] : in [unfilled] week(s) [de-identified] : EUFLEXXA BILATERAL KNEES\par LOT# [id number]\par EXP: 10/03/2020

## 2019-03-22 NOTE — END OF VISIT
[FreeTextEntry3] : All medical record entries made by the Scribe were at my, Dr. Estes's, discretion and personally dictated by me on 03/22/2019. I have reviewed the chart and agree that the record accurately reflects my personal performance of the history, physical exam, assessment and plan. I have also personally directed, reviewed and agreed to the chart.

## 2019-03-29 ENCOUNTER — APPOINTMENT (OUTPATIENT)
Dept: ORTHOPEDIC SURGERY | Facility: CLINIC | Age: 64
End: 2019-03-29
Payer: COMMERCIAL

## 2019-03-29 PROCEDURE — 20610 DRAIN/INJ JOINT/BURSA W/O US: CPT | Mod: LT

## 2019-03-31 RX ADMIN — Medication 1 MG/2ML: at 00:00

## 2019-03-31 NOTE — ADDENDUM
[FreeTextEntry1] : The patient’s case was personally discussed with Dr. Estes who was in agreement with the assessment and plan\par \par

## 2019-03-31 NOTE — PROCEDURE
[de-identified] : EUFLEXXA # 2 of 3\par BILATERAL KNEES\par LOT# [id number]\par EXP: 10-\par \par \par Procedure: Injection of bilateral knee joint. \par Indication:  Effusion, Osteoarthritis, Inflammation and Joint pain. \par Risk, benefits and alternatives were discussed with the patient. Potential complications include bleeding, infection and allergic reaction. Verbal consent was obtained prior to the procedure. \par Alcohol and Betadine was used to prep the area. ethyl chloride spray was used as a topical anesthetic. An 18-gauge was used to inject 2mL Euflexxa lot# [id number]. A bandage was applied. The patient tolerated the procedure well. \par Complications: none. \par Patient instructed to avoid strenuous activity for 2 day(s). \par Follow-up in the office in 1 week(s)

## 2019-04-01 ENCOUNTER — RX RENEWAL (OUTPATIENT)
Age: 64
End: 2019-04-01

## 2019-04-05 ENCOUNTER — APPOINTMENT (OUTPATIENT)
Dept: ORTHOPEDIC SURGERY | Facility: CLINIC | Age: 64
End: 2019-04-05
Payer: COMMERCIAL

## 2019-04-05 PROCEDURE — 20610 DRAIN/INJ JOINT/BURSA W/O US: CPT | Mod: LT

## 2019-04-05 RX ADMIN — Medication 1 MG/2ML: at 00:00

## 2019-04-05 NOTE — ADDENDUM
[FreeTextEntry1] : Dr. Estes was physically present during the key portions the encounter, provided assistance as needed, and formulated the assessment and plan as documented above.\par \par

## 2019-04-05 NOTE — PROCEDURE
[de-identified] : EUFLEXXA # 3 of 3\par BILATERAL KNEES\par LOT# [id number]\par EXP: 10-\par \par \par Procedure: Injection of bilateral knee joint. \par Indication: Effusion, Osteoarthritis, Inflammation and Joint pain. \par Risk, benefits and alternatives were discussed with the patient. Potential complications include bleeding, infection and allergic reaction. Verbal consent was obtained prior to the procedure. \par Alcohol and Betadine was used to prep the area. ethyl chloride spray was used as a topical anesthetic. An 18-gauge was used to inject 2mL Euflexxa lot# [id number]. A bandage was applied. The patient tolerated the procedure well. \par Complications: none. \par Patient instructed to avoid strenuous activity for 2 day(s). \par Follow-up in the office as needed \par

## 2019-04-16 ENCOUNTER — RX RENEWAL (OUTPATIENT)
Age: 64
End: 2019-04-16

## 2019-04-29 ENCOUNTER — TRANSCRIPTION ENCOUNTER (OUTPATIENT)
Age: 64
End: 2019-04-29

## 2019-05-03 ENCOUNTER — LABORATORY RESULT (OUTPATIENT)
Age: 64
End: 2019-05-03

## 2019-05-03 ENCOUNTER — APPOINTMENT (OUTPATIENT)
Dept: VASCULAR SURGERY | Facility: CLINIC | Age: 64
End: 2019-05-03
Payer: COMMERCIAL

## 2019-05-03 ENCOUNTER — RX RENEWAL (OUTPATIENT)
Age: 64
End: 2019-05-03

## 2019-05-03 VITALS — DIASTOLIC BLOOD PRESSURE: 60 MMHG | OXYGEN SATURATION: 91 % | HEART RATE: 74 BPM | SYSTOLIC BLOOD PRESSURE: 104 MMHG

## 2019-05-03 PROCEDURE — 93971 EXTREMITY STUDY: CPT

## 2019-05-03 PROCEDURE — 99204 OFFICE O/P NEW MOD 45 MIN: CPT

## 2019-05-03 NOTE — HISTORY OF PRESENT ILLNESS
[FreeTextEntry1] : 63 M w/ hx of HTN, HLD, CHF w/ EF of 25%, DM w/ insulin pump, CAD s/p PCI in 2013, and CABG in 2018, presents today w/ complaints of new ulcerations of the anterior LLE. Patient reports new ulcers of anterior shin of LLE in 2 focal areas associated with tenderness, redness but no fluctuance or purlent drainage. 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 Denies recent fevers, chills, rigors. Also denies claudication or rest pain. \par \par

## 2019-05-03 NOTE — ASSESSMENT
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

## 2019-05-03 NOTE — PHYSICAL EXAM
[Normal Breath Sounds] : Normal breath sounds [Normal Heart Sounds] : normal heart sounds [Alert] : alert [Oriented to Person] : oriented to person [Oriented to Place] : oriented to place [Oriented to Time] : oriented to time [Calm] : calm [JVD] : no jugular venous distention  [Carotid Bruits] : no carotid bruits [Abdomen Masses] : No abdominal masses [Abdomen Tenderness] : ~T ~M No abdominal tenderness [de-identified] : NAD, pleasant [de-identified] : open ulcers in 2 focal areas on anterior shin of LLE, w/ minimal surrounding erythema, and no drainage

## 2019-05-05 LAB
ALBUMIN SERPL ELPH-MCNC: 4.3 G/DL
ALP BLD-CCNC: 81 U/L
ALT SERPL-CCNC: 20 U/L
ANION GAP SERPL CALC-SCNC: 13 MMOL/L
AST SERPL-CCNC: 15 U/L
BASOPHILS # BLD AUTO: 0.08 K/UL
BASOPHILS NFR BLD AUTO: 0.8 %
BILIRUB SERPL-MCNC: 1 MG/DL
BUN SERPL-MCNC: 29 MG/DL
CALCIUM SERPL-MCNC: 9.1 MG/DL
CHLORIDE SERPL-SCNC: 101 MMOL/L
CHOLEST SERPL-MCNC: 98 MG/DL
CHOLEST/HDLC SERPL: 2.9 RATIO
CO2 SERPL-SCNC: 24 MMOL/L
CREAT SERPL-MCNC: 1.21 MG/DL
EOSINOPHIL # BLD AUTO: 0.28 K/UL
EOSINOPHIL NFR BLD AUTO: 2.8 %
ESTIMATED AVERAGE GLUCOSE: 206 MG/DL
GLUCOSE SERPL-MCNC: 174 MG/DL
HBA1C MFR BLD HPLC: 8.8 %
HCT VFR BLD CALC: 36.3 %
HDLC SERPL-MCNC: 34 MG/DL
HGB BLD-MCNC: 10.5 G/DL
IMM GRANULOCYTES NFR BLD AUTO: 0.3 %
LDLC SERPL CALC-MCNC: 51 MG/DL
LYMPHOCYTES # BLD AUTO: 1.07 K/UL
LYMPHOCYTES NFR BLD AUTO: 10.7 %
MAN DIFF?: NORMAL
MCHC RBC-ENTMCNC: 22.2 PG
MCHC RBC-ENTMCNC: 28.9 GM/DL
MCV RBC AUTO: 76.9 FL
MONOCYTES # BLD AUTO: 0.94 K/UL
MONOCYTES NFR BLD AUTO: 9.4 %
NEUTROPHILS # BLD AUTO: 7.61 K/UL
NEUTROPHILS NFR BLD AUTO: 76 %
PLATELET # BLD AUTO: 289 K/UL
POTASSIUM SERPL-SCNC: 5.3 MMOL/L
PROT SERPL-MCNC: 6.9 G/DL
RBC # BLD: 4.72 M/UL
RBC # FLD: 20.7 %
SODIUM SERPL-SCNC: 138 MMOL/L
TRIGL SERPL-MCNC: 63 MG/DL
TSH SERPL-ACNC: 5.17 UIU/ML
WBC # FLD AUTO: 10.01 K/UL

## 2019-05-06 ENCOUNTER — RX RENEWAL (OUTPATIENT)
Age: 64
End: 2019-05-06

## 2019-05-06 NOTE — PATIENT PROFILE ADULT - RESOURCE/ENVIRONMENTAL CONCERNS
----- Message from Rico Kothari sent at 5/6/2019 11:04 AM CDT -----  Contact: Patient [phone]  RX request - refill or new RX.  Is this a refill or new RX:  Refill  RX name and strength: clonazePAM (KLONOPIN) 1 MG tablet    Is this a 30 day or 90 day RX:  90 Day supply    Pharmacy name and phone # [id number] [address] [phone] (Phone) [phone] (Fax    Please call an advise  Thank you     none

## 2019-05-09 ENCOUNTER — LABORATORY RESULT (OUTPATIENT)
Age: 64
End: 2019-05-09

## 2019-05-09 ENCOUNTER — APPOINTMENT (OUTPATIENT)
Dept: ENDOCRINOLOGY | Facility: CLINIC | Age: 64
End: 2019-05-09
Payer: COMMERCIAL

## 2019-05-09 VITALS
WEIGHT: 259 LBS | SYSTOLIC BLOOD PRESSURE: 112 MMHG | DIASTOLIC BLOOD PRESSURE: 63 MMHG | HEIGHT: 73 IN | BODY MASS INDEX: 34.33 KG/M2 | HEART RATE: 69 BPM

## 2019-05-09 PROCEDURE — 82962 GLUCOSE BLOOD TEST: CPT

## 2019-05-09 PROCEDURE — 99214 OFFICE O/P EST MOD 30 MIN: CPT | Mod: 25

## 2019-05-10 NOTE — PHYSICAL EXAM
H&P Update:  Carolann Do was seen and examined. History and physical has been reviewed. The patient has been examined.  There have been no significant clinical changes since the completion of the originally dated History and Physical.    Signed By: Mitch Jones MD     December 14, 2017 7:16 AM [No Edema] : there was no peripheral edema [Alert] : alert [Normal Sclera/Conjunctiva] : normal sclera/conjunctiva [Normal Outer Ear/Nose] : the ears and nose were normal in appearance [No Respiratory Distress] : no respiratory distress [No Accessory Muscle Use] : no accessory muscle use [Normal Bowel Sounds] : normal bowel sounds [Spine Straight] : spine straight [No Stigmata of Cushings Syndrome] : no stigmata of cushings syndrome [Normal Gait] : normal gait [Normal Reflexes] : deep tendon reflexes were 2+ and symmetric [Oriented x3] : oriented to person, place, and time [de-identified] : varicose veins [de-identified] : secondary skin lesions, L toe nail black

## 2019-05-10 NOTE — REVIEW OF SYSTEMS
[Negative] : Psychiatric [As Noted in HPI] : as noted in HPI [de-identified] : episodes of hypoglycemia

## 2019-05-10 NOTE — ASSESSMENT
[FreeTextEntry1] : 62 y/o M pt with:\par 1. Long standing Hx of DM (on insulin pump over 5 yrs), poor controlled:\par Recent A1c 8.8%, with an average of 9% x  2 years.\par Pt has hypoglycemia episodes, will review insulin pump settings. \par Hypoglycemia prevention and treatment explained, along with DM goals. \par Will decrease basal 4u per hour. \par Labs today.\par \par Return in 4 weeks.  [Hypoglycemia Management] : hypoglycemia management [Long Term Vascular Complications] : long term vascular complications of diabetes [Importance of Diet and Exercise] : importance of diet and exercise to improve glycemic control, achieve weight loss and improve cardiovascular health

## 2019-05-10 NOTE — HISTORY OF PRESENT ILLNESS
[FreeTextEntry1] : 8/2018 pt was 259lbs and today pt is 259lbs\par 5/3/19 TSH 5.1, A1c 8.8% (9.7% in 10/2018 and 9.8% in 2017), LDL-c 51, s. creat 1.21\par \par 64 y/o M pt, /63, BMI 34.17, with Hx of DM (dx ~27 yrs) on insulin pump.\par DM related complications: peripheral neuropathy, 2 microaneurysm b/l laser, CAD\par Other PMHx: CHF, minimally invasive CABG (in ~8/2018) and then a coronary stent thrombosis, STEMI, hypercholesterolemia, obesity, peripheral vascular disease, anemia, osteoarthritis, \par Drug allergies: ACE inhibitors, Atorvastatin, Carvedilol, Entresto\par Last funduscopic visit: a while ago.\par \par Today pt presents for DM f/u, with POCT of 59 and was given apple juice immediately. Pt presents feeling well and notes episodes of hypoglycemia 2x a week. He states his hands and knees swell up due to his allergic reaction to ACE inhibitors. Pt reports he was on Humalog, and is now on insulin pump for the past 5 years, however switched to a new version of the pump in the past 2 yrs. He also notes he lost weight in the past. \par \par Current Meds: Metoprolol, Fiasp, Jardiance, Basal insulin 6u per hour

## 2019-05-10 NOTE — END OF VISIT
[FreeTextEntry3] : All medical record entries made by the Scribe were at my, Dr. Michael Cormier, direction and personally dictated by me on 05/09/2019. I have reviewed the chart and agree that the record accurately reflects my personal performance of the history, physical exam, assessment and plan. I have also personally directed, reviewed and agreed with the chart.\par  [>50% of Time Spent on Counseling for ____] : Greater than 50% of the encounter time was spent on counseling for [unfilled] [Time Spent: ___ minutes] : I have spent [unfilled] minutes of face to face time with the patient

## 2019-05-10 NOTE — ADDENDUM
[FreeTextEntry1] : I, Davidnoemi Rosales, acted solely as a scribe for Dr. Michael Cormier on this date. 05/09/2019.\par

## 2019-05-15 ENCOUNTER — RX RENEWAL (OUTPATIENT)
Age: 64
End: 2019-05-15

## 2019-05-17 ENCOUNTER — APPOINTMENT (OUTPATIENT)
Dept: ENDOCRINOLOGY | Facility: CLINIC | Age: 64
End: 2019-05-17

## 2019-05-17 ENCOUNTER — OTHER (OUTPATIENT)
Age: 64
End: 2019-05-17

## 2019-05-20 LAB
ALBUMIN SERPL ELPH-MCNC: 4.3 G/DL
ALP BLD-CCNC: 89 U/L
ALT SERPL-CCNC: 20 U/L
ANION GAP SERPL CALC-SCNC: 13 MMOL/L
AST SERPL-CCNC: 16 U/L
BILIRUB SERPL-MCNC: 1.1 MG/DL
BUN SERPL-MCNC: 29 MG/DL
CALCIUM SERPL-MCNC: 9.4 MG/DL
CHLORIDE SERPL-SCNC: 101 MMOL/L
CHOLEST SERPL-MCNC: 89 MG/DL
CHOLEST/HDLC SERPL: 2.7 RATIO
CO2 SERPL-SCNC: 29 MMOL/L
CREAT SERPL-MCNC: 1.43 MG/DL
CREAT SPEC-SCNC: 111 MG/DL
ESTIMATED AVERAGE GLUCOSE: 197 MG/DL
GLUCOSE BLDC GLUCOMTR-MCNC: 82
GLUCOSE SERPL-MCNC: 79 MG/DL
HBA1C MFR BLD HPLC: 8.5 %
HDLC SERPL-MCNC: 33 MG/DL
LDLC SERPL CALC-MCNC: 41 MG/DL
MICROALBUMIN 24H UR DL<=1MG/L-MCNC: 7.6 MG/DL
MICROALBUMIN/CREAT 24H UR-RTO: 69 MG/G
POTASSIUM SERPL-SCNC: 5.1 MMOL/L
PROT SERPL-MCNC: 7 G/DL
SODIUM SERPL-SCNC: 143 MMOL/L
TRIGL SERPL-MCNC: 75 MG/DL
TSH SERPL-ACNC: 3.91 UIU/ML

## 2019-06-03 ENCOUNTER — RX RENEWAL (OUTPATIENT)
Age: 64
End: 2019-06-03

## 2019-06-05 ENCOUNTER — APPOINTMENT (OUTPATIENT)
Dept: ENDOCRINOLOGY | Facility: CLINIC | Age: 64
End: 2019-06-05

## 2019-06-07 ENCOUNTER — APPOINTMENT (OUTPATIENT)
Dept: HEART AND VASCULAR | Facility: CLINIC | Age: 64
End: 2019-06-07
Payer: COMMERCIAL

## 2019-06-07 ENCOUNTER — NON-APPOINTMENT (OUTPATIENT)
Age: 64
End: 2019-06-07

## 2019-06-07 VITALS
WEIGHT: 259 LBS | BODY MASS INDEX: 34.33 KG/M2 | HEIGHT: 73 IN | DIASTOLIC BLOOD PRESSURE: 68 MMHG | SYSTOLIC BLOOD PRESSURE: 110 MMHG

## 2019-06-07 PROCEDURE — 93000 ELECTROCARDIOGRAM COMPLETE: CPT

## 2019-06-07 PROCEDURE — ZZZZZ: CPT

## 2019-06-07 PROCEDURE — 93306 TTE W/DOPPLER COMPLETE: CPT

## 2019-06-07 PROCEDURE — 99396 PREV VISIT EST AGE 40-64: CPT | Mod: 25

## 2019-06-07 PROCEDURE — 36415 COLL VENOUS BLD VENIPUNCTURE: CPT

## 2019-06-07 RX ORDER — DULOXETINE HYDROCHLORIDE 30 MG/1
CAPSULE, DELAYED RELEASE ORAL
Refills: 0 | Status: DISCONTINUED | COMMUNITY

## 2019-06-07 RX ORDER — EMPAGLIFLOZIN 10 MG/1
10 TABLET, FILM COATED ORAL
Refills: 0 | Status: DISCONTINUED | COMMUNITY

## 2019-06-07 RX ORDER — ASPIRIN 81 MG
81 TABLET,CHEWABLE ORAL
Refills: 0 | Status: DISCONTINUED | COMMUNITY

## 2019-06-07 RX ORDER — DAPAGLIFLOZIN 5 MG/1
5 TABLET, FILM COATED ORAL
Qty: 30 | Refills: 1 | Status: DISCONTINUED | COMMUNITY
Start: 2018-12-13 | End: 2019-06-07

## 2019-06-07 RX ORDER — HYALURONATE SODIUM 20 MG/2 ML
20 SYRINGE (ML) INTRAARTICULAR
Qty: 2 | Refills: 0 | Status: DISCONTINUED | COMMUNITY
Start: 2019-03-06 | End: 2019-06-07

## 2019-06-07 RX ORDER — METOPROLOL TARTRATE 100 MG/1
100 TABLET, FILM COATED ORAL
Refills: 0 | Status: DISCONTINUED | COMMUNITY

## 2019-06-07 RX ORDER — SACUBITRIL AND VALSARTAN 24; 26 MG/1; MG/1
24-26 TABLET, FILM COATED ORAL TWICE DAILY
Refills: 0 | Status: DISCONTINUED | COMMUNITY
End: 2019-06-07

## 2019-06-07 RX ORDER — EZETIMIBE 10 MG/1
10 TABLET ORAL
Refills: 0 | Status: DISCONTINUED | COMMUNITY

## 2019-06-07 RX ORDER — FUROSEMIDE 40 MG/1
40 TABLET ORAL
Refills: 0 | Status: DISCONTINUED | COMMUNITY

## 2019-06-07 NOTE — DISCUSSION/SUMMARY
[FreeTextEntry1] : The patient has a cardiomyopathy. He is sedentary but asymptomatic. His EKG shows an IVCD and is unchanged. The echocardiogram revealed a moderately reduced ejection fraction which is slightly improved. The patient did not tolerate Entresto. He was again referred to an allergist. He is anemic and iron deficient. He is guaiac negative. He will get a colonoscopy and a GI evaluation. Laboratory testing was done. He will continue his current medication.

## 2019-06-07 NOTE — HISTORY OF PRESENT ILLNESS
[FreeTextEntry1] : The patient is sedentary. He has not had any chest pain or dyspnea. He had an eye exam 2 months ago. He will see the podiatrist tomorrow.

## 2019-06-07 NOTE — PHYSICAL EXAM
[General Appearance - Well Developed] : well developed [Normal Appearance] : normal appearance [Well Groomed] : well groomed [General Appearance - Well Nourished] : well nourished [No Deformities] : no deformities [General Appearance - In No Acute Distress] : no acute distress [Normal Conjunctiva] : the conjunctiva exhibited no abnormalities [Eyelids - No Xanthelasma] : the eyelids demonstrated no xanthelasmas [Normal Oral Mucosa] : normal oral mucosa [No Oral Pallor] : no oral pallor [No Oral Cyanosis] : no oral cyanosis [Normal Jugular Venous A Waves Present] : normal jugular venous A waves present [Normal Jugular Venous V Waves Present] : normal jugular venous V waves present [No Jugular Venous Gonzalez A Waves] : no jugular venous gonzalez A waves [Respiration, Rhythm And Depth] : normal respiratory rhythm and effort [Exaggerated Use Of Accessory Muscles For Inspiration] : no accessory muscle use [Auscultation Breath Sounds / Voice Sounds] : lungs were clear to auscultation bilaterally [Heart Rate And Rhythm] : heart rate and rhythm were normal [Heart Sounds] : normal S1 and S2 [Murmurs] : no murmurs present [Abdomen Soft] : soft [Abdomen Tenderness] : non-tender [Abdomen Mass (___ Cm)] : no abdominal mass palpated [Abnormal Walk] : normal gait [Gait - Sufficient For Exercise Testing] : the gait was sufficient for exercise testing [Nail Clubbing] : no clubbing of the fingernails [Petechial Hemorrhages (___cm)] : no petechial hemorrhages [Cyanosis, Localized] : no localized cyanosis [Skin Color & Pigmentation] : normal skin color and pigmentation [No Venous Stasis] : no venous stasis [] : no rash [No Skin Ulcers] : no skin ulcer [FreeTextEntry1] : Status post right heel the procedure  [No Xanthoma] : no  xanthoma was observed [Oriented To Time, Place, And Person] : oriented to person, place, and time [Affect] : the affect was normal [Mood] : the mood was normal [No Anxiety] : not feeling anxious

## 2019-06-11 LAB
ANION GAP SERPL CALC-SCNC: 13 MMOL/L
BASOPHILS # BLD AUTO: 0.08 K/UL
BASOPHILS NFR BLD AUTO: 0.9 %
BUN SERPL-MCNC: 28 MG/DL
CALCIUM SERPL-MCNC: 9.5 MG/DL
CHLORIDE SERPL-SCNC: 104 MMOL/L
CO2 SERPL-SCNC: 25 MMOL/L
CREAT SERPL-MCNC: 1.15 MG/DL
EOSINOPHIL # BLD AUTO: 0.27 K/UL
EOSINOPHIL NFR BLD AUTO: 3.2 %
FERRITIN SERPL-MCNC: 29 NG/ML
FOLATE SERPL-MCNC: 10.8 NG/ML
GLUCOSE SERPL-MCNC: 212 MG/DL
HCT VFR BLD CALC: 44.4 %
HGB BLD-MCNC: 12.4 G/DL
IMM GRANULOCYTES NFR BLD AUTO: 0.1 %
IRON SATN MFR SERPL: 6 %
IRON SERPL-MCNC: 24 UG/DL
LYMPHOCYTES # BLD AUTO: 1.23 K/UL
LYMPHOCYTES NFR BLD AUTO: 14.5 %
MAN DIFF?: NORMAL
MCHC RBC-ENTMCNC: 22.2 PG
MCHC RBC-ENTMCNC: 27.9 GM/DL
MCV RBC AUTO: 79.4 FL
MONOCYTES # BLD AUTO: 0.79 K/UL
MONOCYTES NFR BLD AUTO: 9.3 %
NEUTROPHILS # BLD AUTO: 6.11 K/UL
NEUTROPHILS NFR BLD AUTO: 72 %
PLATELET # BLD AUTO: 384 K/UL
POTASSIUM SERPL-SCNC: 4.8 MMOL/L
PSA SERPL-MCNC: 3.97 NG/ML
RBC # BLD: 5.59 M/UL
RBC # FLD: 20.5 %
SODIUM SERPL-SCNC: 142 MMOL/L
TIBC SERPL-MCNC: 432 UG/DL
UIBC SERPL-MCNC: 408 UG/DL
VIT B12 SERPL-MCNC: 463 PG/ML
WBC # FLD AUTO: 8.49 K/UL

## 2019-06-14 ENCOUNTER — RX RENEWAL (OUTPATIENT)
Age: 64
End: 2019-06-14

## 2019-06-21 ENCOUNTER — APPOINTMENT (OUTPATIENT)
Dept: ENDOCRINOLOGY | Facility: CLINIC | Age: 64
End: 2019-06-21
Payer: SELF-PAY

## 2019-06-21 PROCEDURE — SNS01: CPT

## 2019-06-28 ENCOUNTER — MEDICATION RENEWAL (OUTPATIENT)
Age: 64
End: 2019-06-28

## 2019-07-12 ENCOUNTER — RESULT CHARGE (OUTPATIENT)
Age: 64
End: 2019-07-12

## 2019-07-12 ENCOUNTER — RX RENEWAL (OUTPATIENT)
Age: 64
End: 2019-07-12

## 2019-07-12 ENCOUNTER — APPOINTMENT (OUTPATIENT)
Dept: ENDOCRINOLOGY | Facility: CLINIC | Age: 64
End: 2019-07-12
Payer: COMMERCIAL

## 2019-07-12 VITALS
DIASTOLIC BLOOD PRESSURE: 60 MMHG | SYSTOLIC BLOOD PRESSURE: 97 MMHG | HEART RATE: 90 BPM | BODY MASS INDEX: 32.72 KG/M2 | WEIGHT: 248 LBS

## 2019-07-12 LAB
GLUCOSE BLDC GLUCOMTR-MCNC: 143
HBA1C MFR BLD HPLC: 7.6

## 2019-07-12 PROCEDURE — 82962 GLUCOSE BLOOD TEST: CPT

## 2019-07-12 PROCEDURE — 99214 OFFICE O/P EST MOD 30 MIN: CPT | Mod: 25

## 2019-07-12 PROCEDURE — 83036 HEMOGLOBIN GLYCOSYLATED A1C: CPT | Mod: QW

## 2019-07-12 NOTE — PHYSICAL EXAM
[Alert] : alert [No Acute Distress] : no acute distress [Well Nourished] : well nourished [Well Developed] : well developed [Normal Sclera/Conjunctiva] : normal sclera/conjunctiva [No Proptosis] : no proptosis [No Lid Lag] : no lid lag [No Respiratory Distress] : no respiratory distress [Normal Rate and Effort] : normal respiratory rhythm and effort [No Accessory Muscle Use] : no accessory muscle use [Normal Rate] : heart rate was normal  [No Edema] : there was no peripheral edema [No Clubbing, Cyanosis] : no clubbing  or cyanosis of the fingernails [No Involuntary Movements] : no involuntary movements were seen [No Rash] : no rash [No Tremors] : no tremors [Oriented x3] : oriented to person, place, and time [Acne] : no acne [Acanthosis Nigricans] : no acanthosis nigricans

## 2019-07-12 NOTE — REVIEW OF SYSTEMS
[Fatigue] : fatigue [Joint Pain] : joint pain [As Noted in HPI] : as noted in HPI [Negative] : Integumentary [All other systems negative] : All other systems negative [FreeTextEntry9] : knee pain

## 2019-07-12 NOTE — HISTORY OF PRESENT ILLNESS
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 Cormier.\par CC: needs to improve glycemic control - has to have a knee surgery.\par PMH - CAD - see cardiology notes.\par Pt SMBG 4-5 times a day, but did not bring meter; checking FS becomes challenging due to neuropathy.\par Needs CGM.\par Pump rates:  - basal rate 6 Units/h during the daytime and 4 Units per h from midnight to 8 am.\par I/c -1/4-1/6CF - 1:25\par IOB 3 h\par \par POC Bg - 116 mg/dl\par \par Ophtho-  and podiatry - UTD\par \par 11/13/18 MK\par Pt came for f/u.\par CGM Cathleen-Pro - wore for 6 days - it fell off; report uploaded and analyzed.\par No changes since last visit\par \par 7/12/19 Mk\par came for f/u.\par Some improvement in glycemic control since last appt. with Dr. Cormier.\par Freestyle cathleen - report viewed via pt's phone - "invite" to [employer] did not work.\par Pt's total daily basal rate is 96 Units.\par POC BG - 143 mg/dl\par POC A1C - 7.6% \par States he tries to eat healthier.

## 2019-07-12 NOTE — ADDENDUM
[FreeTextEntry1] : Spoke to Dr. Cormier about Tx options - will add Trulicity and f/u in 4-6 weeks.\par Called pt to f/u on the pharmacy phone number - he will call me back.\par Also, will stop by once gets Trulicity for self-administration of the first dose in the office.\par \par

## 2019-07-12 NOTE — ASSESSMENT
[FreeTextEntry1] : Diabetes - control is improving.\par Will discuss with Dr. Cormier the following Tx options:\par - Increasing SGLT-2?\par - adding GLP-1? Will monitor kidney function, and refer to nephrology.\par - Switching to U-500 on a pump?\par CGM patterns discussed with patient; \par he will call me with his mail-in pharmacy info and I will send rx, after discussing options with Dr. Cormier.\par

## 2019-07-13 ENCOUNTER — TRANSCRIPTION ENCOUNTER (OUTPATIENT)
Age: 64
End: 2019-07-13

## 2019-07-18 ENCOUNTER — OTHER (OUTPATIENT)
Age: 64
End: 2019-07-18

## 2019-07-19 ENCOUNTER — APPOINTMENT (OUTPATIENT)
Dept: ORTHOPEDIC SURGERY | Facility: CLINIC | Age: 64
End: 2019-07-19
Payer: COMMERCIAL

## 2019-07-19 VITALS
DIASTOLIC BLOOD PRESSURE: 70 MMHG | OXYGEN SATURATION: 97 % | HEIGHT: 73 IN | SYSTOLIC BLOOD PRESSURE: 122 MMHG | HEART RATE: 71 BPM

## 2019-07-19 VITALS — WEIGHT: 242 LBS | BODY MASS INDEX: 31.93 KG/M2

## 2019-07-19 DIAGNOSIS — M17.0 BILATERAL PRIMARY OSTEOARTHRITIS OF KNEE: ICD-10-CM

## 2019-07-19 PROCEDURE — 20610 DRAIN/INJ JOINT/BURSA W/O US: CPT | Mod: 50

## 2019-07-19 NOTE — PROCEDURE
[Injection] : Injection [Bilateral] : of bilateral [Knee Joint] : knee joint [Osteoarthritis] : Osteoarthritis [Joint Pain] : Joint pain [Patient] : patient [Risk] : risk [Benefits] : benefits [Alternatives] : alternatives [Bleeding] : bleeding [Infection] : infection [Allergic Reaction] : allergic reaction [Verbal Consent Obtained] : verbal consent was obtained prior to the procedure [Alcohol] : Alcohol [Betadine] : Betadine [Ethyl Chloride Spray] : ethyl chloride spray was used as a topical anesthetic [Lateral] : lateral [20] : a 20-gauge [1.5  inch] : 1.5 inch needle [Bandage Applied] : a bandage [Tolerated Well] : The patient tolerated the procedure well [None] : none [No Strenuous Activity___day(s)] : avoid strenuous activity for [unfilled] day(s) [PRN] : as needed [de-identified] : [employer]\par Bilateral knees\par Lot.No: [id number]\par Exp.Date: Oct 2020\par NDC: 47310-940-93\par Man: Flexion [FreeTextEntry8] : [employer] [id number]

## 2019-08-08 ENCOUNTER — RX RENEWAL (OUTPATIENT)
Age: 64
End: 2019-08-08

## 2019-08-19 ENCOUNTER — APPOINTMENT (OUTPATIENT)
Dept: OPHTHALMOLOGY | Facility: CLINIC | Age: 64
End: 2019-08-19
Payer: COMMERCIAL

## 2019-08-19 ENCOUNTER — NON-APPOINTMENT (OUTPATIENT)
Age: 64
End: 2019-08-19

## 2019-08-19 ENCOUNTER — TRANSCRIPTION ENCOUNTER (OUTPATIENT)
Age: 64
End: 2019-08-19

## 2019-08-19 PROCEDURE — 92134 CPTRZ OPH DX IMG PST SGM RTA: CPT

## 2019-08-19 PROCEDURE — 92002 INTRM OPH EXAM NEW PATIENT: CPT

## 2019-08-21 ENCOUNTER — NON-APPOINTMENT (OUTPATIENT)
Age: 64
End: 2019-08-21

## 2019-08-21 ENCOUNTER — APPOINTMENT (OUTPATIENT)
Dept: OPHTHALMOLOGY | Facility: CLINIC | Age: 64
End: 2019-08-21
Payer: COMMERCIAL

## 2019-08-21 PROCEDURE — 92225: CPT | Mod: RT

## 2019-08-21 PROCEDURE — 92250 FUNDUS PHOTOGRAPHY W/I&R: CPT

## 2019-08-21 PROCEDURE — 92014 COMPRE OPH EXAM EST PT 1/>: CPT | Mod: 25

## 2019-08-21 PROCEDURE — 92235 FLUORESCEIN ANGRPH MLTIFRAME: CPT

## 2019-08-21 PROCEDURE — 67028 INJECTION EYE DRUG: CPT | Mod: LT

## 2019-08-26 ENCOUNTER — NON-APPOINTMENT (OUTPATIENT)
Age: 64
End: 2019-08-26

## 2019-08-26 ENCOUNTER — APPOINTMENT (OUTPATIENT)
Dept: OPHTHALMOLOGY | Facility: CLINIC | Age: 64
End: 2019-08-26
Payer: COMMERCIAL

## 2019-08-26 PROCEDURE — 67028 INJECTION EYE DRUG: CPT | Mod: RT

## 2019-08-26 PROCEDURE — 92134 CPTRZ OPH DX IMG PST SGM RTA: CPT

## 2019-08-29 ENCOUNTER — NON-APPOINTMENT (OUTPATIENT)
Age: 64
End: 2019-08-29

## 2019-08-29 ENCOUNTER — APPOINTMENT (OUTPATIENT)
Dept: OPHTHALMOLOGY | Facility: CLINIC | Age: 64
End: 2019-08-29
Payer: COMMERCIAL

## 2019-08-29 PROCEDURE — 67228 TREATMENT X10SV RETINOPATHY: CPT | Mod: 79,LT

## 2019-08-29 PROCEDURE — 92012 INTRM OPH EXAM EST PATIENT: CPT | Mod: 25

## 2019-09-06 ENCOUNTER — APPOINTMENT (OUTPATIENT)
Dept: OPHTHALMOLOGY | Facility: CLINIC | Age: 64
End: 2019-09-06
Payer: COMMERCIAL

## 2019-09-06 ENCOUNTER — NON-APPOINTMENT (OUTPATIENT)
Age: 64
End: 2019-09-06

## 2019-09-06 PROCEDURE — 99024 POSTOP FOLLOW-UP VISIT: CPT

## 2019-09-06 PROCEDURE — 67228 TREATMENT X10SV RETINOPATHY: CPT | Mod: 79,RT

## 2019-09-20 ENCOUNTER — NON-APPOINTMENT (OUTPATIENT)
Age: 64
End: 2019-09-20

## 2019-09-20 ENCOUNTER — APPOINTMENT (OUTPATIENT)
Dept: OPHTHALMOLOGY | Facility: CLINIC | Age: 64
End: 2019-09-20
Payer: COMMERCIAL

## 2019-09-20 PROCEDURE — 92134 CPTRZ OPH DX IMG PST SGM RTA: CPT

## 2019-09-20 PROCEDURE — 67228 TREATMENT X10SV RETINOPATHY: CPT | Mod: LT

## 2019-09-23 ENCOUNTER — OUTPATIENT (OUTPATIENT)
Dept: OUTPATIENT SERVICES | Facility: HOSPITAL | Age: 64
LOS: 1 days | Discharge: HOME | End: 2019-09-23

## 2019-09-23 DIAGNOSIS — Z95.5 PRESENCE OF CORONARY ANGIOPLASTY IMPLANT AND GRAFT: Chronic | ICD-10-CM

## 2019-09-23 DIAGNOSIS — Z01.812 ENCOUNTER FOR PREPROCEDURAL LABORATORY EXAMINATION: ICD-10-CM

## 2019-09-27 ENCOUNTER — APPOINTMENT (OUTPATIENT)
Dept: OPHTHALMOLOGY | Facility: CLINIC | Age: 64
End: 2019-09-27

## 2019-10-01 ENCOUNTER — MEDICATION RENEWAL (OUTPATIENT)
Age: 64
End: 2019-10-01

## 2019-10-02 ENCOUNTER — NON-APPOINTMENT (OUTPATIENT)
Age: 64
End: 2019-10-02

## 2019-10-02 ENCOUNTER — APPOINTMENT (OUTPATIENT)
Dept: OPHTHALMOLOGY | Facility: CLINIC | Age: 64
End: 2019-10-02
Payer: COMMERCIAL

## 2019-10-02 PROCEDURE — 92134 CPTRZ OPH DX IMG PST SGM RTA: CPT

## 2019-10-02 PROCEDURE — 67228 TREATMENT X10SV RETINOPATHY: CPT | Mod: RT

## 2019-10-04 ENCOUNTER — OTHER (OUTPATIENT)
Age: 64
End: 2019-10-04

## 2019-10-05 ENCOUNTER — TRANSCRIPTION ENCOUNTER (OUTPATIENT)
Age: 64
End: 2019-10-05

## 2019-10-09 ENCOUNTER — NON-APPOINTMENT (OUTPATIENT)
Age: 64
End: 2019-10-09

## 2019-10-09 ENCOUNTER — FORM ENCOUNTER (OUTPATIENT)
Age: 64
End: 2019-10-09

## 2019-10-09 ENCOUNTER — APPOINTMENT (OUTPATIENT)
Dept: OPHTHALMOLOGY | Facility: CLINIC | Age: 64
End: 2019-10-09
Payer: COMMERCIAL

## 2019-10-09 PROCEDURE — 99024 POSTOP FOLLOW-UP VISIT: CPT

## 2019-10-09 PROCEDURE — 92134 CPTRZ OPH DX IMG PST SGM RTA: CPT

## 2019-10-09 PROCEDURE — 67028 INJECTION EYE DRUG: CPT | Mod: 50,58

## 2019-10-10 ENCOUNTER — APPOINTMENT (OUTPATIENT)
Dept: HEART AND VASCULAR | Facility: CLINIC | Age: 64
End: 2019-10-10
Payer: COMMERCIAL

## 2019-10-10 ENCOUNTER — LABORATORY RESULT (OUTPATIENT)
Age: 64
End: 2019-10-10

## 2019-10-10 ENCOUNTER — OUTPATIENT (OUTPATIENT)
Dept: OUTPATIENT SERVICES | Facility: HOSPITAL | Age: 64
LOS: 1 days | End: 2019-10-10
Payer: COMMERCIAL

## 2019-10-10 ENCOUNTER — APPOINTMENT (OUTPATIENT)
Dept: ORTHOPEDIC SURGERY | Facility: CLINIC | Age: 64
End: 2019-10-10
Payer: COMMERCIAL

## 2019-10-10 VITALS
BODY MASS INDEX: 31.14 KG/M2 | SYSTOLIC BLOOD PRESSURE: 104 MMHG | HEART RATE: 72 BPM | DIASTOLIC BLOOD PRESSURE: 80 MMHG | HEIGHT: 73 IN | WEIGHT: 235 LBS

## 2019-10-10 VITALS — WEIGHT: 235 LBS | BODY MASS INDEX: 31.14 KG/M2 | HEIGHT: 73 IN

## 2019-10-10 DIAGNOSIS — E66.01 MORBID (SEVERE) OBESITY DUE TO EXCESS CALORIES: ICD-10-CM

## 2019-10-10 DIAGNOSIS — M17.11 UNILATERAL PRIMARY OSTEOARTHRITIS, RIGHT KNEE: ICD-10-CM

## 2019-10-10 DIAGNOSIS — Z00.00 ENCOUNTER FOR GENERAL ADULT MEDICAL EXAMINATION W/OUT ABNORMAL FINDINGS: ICD-10-CM

## 2019-10-10 DIAGNOSIS — E66.9 OBESITY, UNSPECIFIED: ICD-10-CM

## 2019-10-10 DIAGNOSIS — Z95.5 PRESENCE OF CORONARY ANGIOPLASTY IMPLANT AND GRAFT: Chronic | ICD-10-CM

## 2019-10-10 DIAGNOSIS — M24.562 CONTRACTURE, LEFT KNEE: ICD-10-CM

## 2019-10-10 DIAGNOSIS — M24.561 CONTRACTURE, RIGHT KNEE: ICD-10-CM

## 2019-10-10 DIAGNOSIS — M17.12 UNILATERAL PRIMARY OSTEOARTHRITIS, LEFT KNEE: ICD-10-CM

## 2019-10-10 PROCEDURE — 71046 X-RAY EXAM CHEST 2 VIEWS: CPT | Mod: 26

## 2019-10-10 PROCEDURE — 73564 X-RAY EXAM KNEE 4 OR MORE: CPT

## 2019-10-10 PROCEDURE — 93000 ELECTROCARDIOGRAM COMPLETE: CPT

## 2019-10-10 PROCEDURE — 71046 X-RAY EXAM CHEST 2 VIEWS: CPT

## 2019-10-10 PROCEDURE — 36415 COLL VENOUS BLD VENIPUNCTURE: CPT

## 2019-10-10 PROCEDURE — 99214 OFFICE O/P EST MOD 30 MIN: CPT | Mod: 25

## 2019-10-10 PROCEDURE — 73564 X-RAY EXAM KNEE 4 OR MORE: CPT | Mod: 26,RT

## 2019-10-10 PROCEDURE — 99213 OFFICE O/P EST LOW 20 MIN: CPT

## 2019-10-10 NOTE — HISTORY OF PRESENT ILLNESS
[de-identified] : 63 y/o M with DM presents for follow up evaluation prior to right TKR scheduled for 10/21/19. He reports moderate, continual right knee pain with walking and stairs along with moderate stiffness and instability on stairs and uneven ground. In the left knee, he reports mild, occasional pain and stiffness as well as instability on uneven ground. Patient can walk less than 5 blocks with a moderate limp. He uses a rail to ascend and descend stairs. He has done all his clearance requirements for the upcoming operation except for the CT scan. \par Mr. Blas reports that he has lost weight recently (25 lbs since last year) and his DM has been better controlled with medication. He denies any recent major changes in his health history.

## 2019-10-10 NOTE — PHYSICAL EXAM
[de-identified] : Well appearing. NAD. Alert and oriented x 3. No respiratory distress.\par Bilateral upper extremities: Skin intact. No deformity. Painless active ROM without evident restriction.\par Cervical, thoracic and lumbar spine: No visible deformity. Painless active ROM without evident restriction. No radicular pain on passive straight leg raise bilaterally.\par \par Gait: Right antalgic.\par Ambulatory assist devices: None.\par \par Bilateral Hips: No swelling or deformity. Painless and unrestricted range of motion. No crepitation.\par \par Right Knee:\par Skin intact. No surgical scars. No erythema or ecchymosis. No swelling or effusion. (+) Varus deformity, partially correctable. No focal tenderness.\par Painful ROM from a 7-10 degree flexion contracture to 105 degrees of flexion.\par Central patellar tracking. (+) crepitation. Good stability.\par \par Left Knee:\par Skin intact. No surgical scars. No erythema or ecchymosis. No swelling or effusion. No deformity. (+) Medial joint line tenderness.\par ROM from a 5-7 degree flexion contracture to 120-125 degrees of flexion.\par Central patellar tracking. (+) crepitation. No instability.\par \par Neurological: Intact distal crude touch sensation. Normal distal motor power. \par Cardiovascular: Lower extremities warm and well perfused. No peripheral edema. [de-identified] : X-ray imaging of the right knee demonstrates severe osteoarthritis with bone-on-bone joint space narrowing, subchondral sclerosis, osteophytes and subchondral cysts

## 2019-10-10 NOTE — DISCUSSION/SUMMARY
[de-identified] : Mr. Blas presents for surgical consult prior to right TKR scheduled for 10/21/19. We previously had a discussion on the risks, benefits and recovery time of a total knee replacement operation but I again answered any additional question. We discussed how both of the patient’s knees have flexion contractures and because of this, he may have a harder time keeping the right knee straight after surgery. To avoid this, I advised him to work hard in physical therapy on straightening the knee after the operation and told him about a pillow he can buy and use at night to help with knee extension. I will have my surgical coordinator provide patient with further information about this "zero degree knee" pillow. Patient can also wear an OTC shoe insert on the left shoe for a few weeks after the operation to help force his right leg to extend during gait.\par \par Mr. Blas will have a right TKR on 10/21/19.

## 2019-10-10 NOTE — END OF VISIT
[FreeTextEntry3] : All medical record entries made by Shahrzad Urban acting as a scribe for the performing provider (Santos Estes MD and/or PACHECO Iraheta) on 10/10/2019. All entries were dictated to me by the performing medical provider. In signing this record, the medical provider affirms that they have personally performed the history, physical exam, assessment and plan and have also directed, reviewed and agreed to the documentation in the chart.

## 2019-10-11 LAB
ALBUMIN SERPL ELPH-MCNC: 4.6 G/DL
ALP BLD-CCNC: 83 U/L
ALT SERPL-CCNC: 46 U/L
ANION GAP SERPL CALC-SCNC: 12 MMOL/L
APTT BLD: 27.9 SEC
AST SERPL-CCNC: 30 U/L
BASOPHILS # BLD AUTO: 0.07 K/UL
BASOPHILS NFR BLD AUTO: 0.9 %
BILIRUB SERPL-MCNC: 0.9 MG/DL
BUN SERPL-MCNC: 29 MG/DL
CALCIUM SERPL-MCNC: 9.6 MG/DL
CHLORIDE SERPL-SCNC: 102 MMOL/L
CHOLEST SERPL-MCNC: 193 MG/DL
CHOLEST/HDLC SERPL: 4.3 RATIO
CO2 SERPL-SCNC: 26 MMOL/L
CREAT SERPL-MCNC: 1.51 MG/DL
EOSINOPHIL # BLD AUTO: 0.24 K/UL
EOSINOPHIL NFR BLD AUTO: 2.9 %
ESTIMATED AVERAGE GLUCOSE: 183 MG/DL
GLUCOSE SERPL-MCNC: 62 MG/DL
HBA1C MFR BLD HPLC: 8 %
HCT VFR BLD CALC: 53 %
HDLC SERPL-MCNC: 45 MG/DL
HGB BLD-MCNC: 15.4 G/DL
IMM GRANULOCYTES NFR BLD AUTO: 0.1 %
INR PPP: 1.01 RATIO
LDLC SERPL CALC-MCNC: 130 MG/DL
LYMPHOCYTES # BLD AUTO: 1.28 K/UL
LYMPHOCYTES NFR BLD AUTO: 15.6 %
MAN DIFF?: NORMAL
MCHC RBC-ENTMCNC: 24.1 PG
MCHC RBC-ENTMCNC: 29.1 GM/DL
MCV RBC AUTO: 82.8 FL
MONOCYTES # BLD AUTO: 1.14 K/UL
MONOCYTES NFR BLD AUTO: 13.9 %
NEUTROPHILS # BLD AUTO: 5.48 K/UL
NEUTROPHILS NFR BLD AUTO: 66.6 %
PLATELET # BLD AUTO: 245 K/UL
POTASSIUM SERPL-SCNC: 5.3 MMOL/L
PROT SERPL-MCNC: 7.3 G/DL
PT BLD: 11.5 SEC
RBC # BLD: 6.4 M/UL
RBC # FLD: 26.1 %
SODIUM SERPL-SCNC: 140 MMOL/L
TRIGL SERPL-MCNC: 92 MG/DL
WBC # FLD AUTO: 8.22 K/UL

## 2019-10-12 ENCOUNTER — NON-APPOINTMENT (OUTPATIENT)
Age: 64
End: 2019-10-12

## 2019-10-12 NOTE — HISTORY OF PRESENT ILLNESS
[FreeTextEntry1] : 64 year old male with past medical history of CAD sp CABG, MI sp RCA stent complicated by stent thrombus on Efficient, CHF ( last EF 40% ), HTN, HLD, DM on insulin pump, PVD (VAISHALI 0.9) and bilateral OA of knees here for clearance for R TKR 10/21/2019.\par \par Since last visit, feeling well. Patient is more active. Patient no longer is primarily sitting down for work and walks about 6 blocks worth in a day however nothing more than 1 block at a time. Patient does report cycling on a stationary bike twice a week, around 45 min each episode, however at unknown speeds. Patient diet has slightly improved and noting Ha1c improving. He has lost approximately 25 lbs in the past 3 months. \par \par Patient denies any chest pain, shortness of breath, palpitations, dizziness, lower extremity swelling or claudications. \par \par Patient does have a history of clotting on Plavix and patient cannot tolerate Brilinta. Patient denies any bleeding complications. Patient does report an incidence of under sedation during intubation.  \par

## 2019-10-12 NOTE — REVIEW OF SYSTEMS
[Fever] : no fever [Chills] : no chills [Shortness Of Breath] : no shortness of breath [Dyspnea on exertion] : not dyspnea during exertion [Chest  Pressure] : no chest pressure [Chest Pain] : no chest pain [Lower Ext Edema] : no extremity edema [Leg Claudication] : no intermittent leg claudication [Palpitations] : no palpitations [Cough] : no cough [Abdominal Pain] : no abdominal pain [Nausea] : no nausea [Vomiting] : no vomiting [Heartburn] : no heartburn [Muscle Cramps] : no muscle cramps [Dizziness] : no dizziness [Easy Bleeding] : no tendency for easy bleeding [Easy Bruising] : no tendency for easy bruising

## 2019-10-12 NOTE — PHYSICAL EXAM
[General Appearance - Well Developed] : well developed [General Appearance - Well Nourished] : well nourished [Respiration, Rhythm And Depth] : normal respiratory rhythm and effort [Exaggerated Use Of Accessory Muscles For Inspiration] : no accessory muscle use [Heart Rate And Rhythm] : heart rate and rhythm were normal [Heart Sounds] : normal S1 and S2 [Murmurs] : no murmurs present [Edema] : no peripheral edema present [Veins - Varicosity Changes] : no varicosital changes were noted in the lower extremities [Abdomen Soft] : soft [Abdomen Tenderness] : non-tender [Abnormal Walk] : normal gait [Gait - Sufficient For Exercise Testing] : the gait was sufficient for exercise testing [Oriented To Time, Place, And Person] : oriented to person, place, and time [Impaired Insight] : insight and judgment were intact [Affect] : the affect was normal [Mood] : the mood was normal [Normal Conjunctiva] : the conjunctiva exhibited no abnormalities [Eyelids - No Xanthelasma] : the eyelids demonstrated no xanthelasmas [Normal Oral Mucosa] : normal oral mucosa [No Oral Pallor] : no oral pallor [No Oral Cyanosis] : no oral cyanosis [Nail Clubbing] : no clubbing of the fingernails [Cyanosis, Localized] : no localized cyanosis [Petechial Hemorrhages (___cm)] : no petechial hemorrhages [Skin Color & Pigmentation] : normal skin color and pigmentation [] : no rash [No Venous Stasis] : no venous stasis [Skin Lesions] : no skin lesions [No Skin Ulcers] : no skin ulcer [No Xanthoma] : no  xanthoma was observed [FreeTextEntry1] : dp 1/1

## 2019-10-13 ENCOUNTER — FORM ENCOUNTER (OUTPATIENT)
Age: 64
End: 2019-10-13

## 2019-10-14 ENCOUNTER — OUTPATIENT (OUTPATIENT)
Dept: OUTPATIENT SERVICES | Facility: HOSPITAL | Age: 64
LOS: 1 days | End: 2019-10-14
Payer: COMMERCIAL

## 2019-10-14 ENCOUNTER — OUTPATIENT (OUTPATIENT)
Dept: OUTPATIENT SERVICES | Facility: HOSPITAL | Age: 64
LOS: 1 days | End: 2019-10-14

## 2019-10-14 ENCOUNTER — APPOINTMENT (OUTPATIENT)
Dept: CT IMAGING | Facility: HOSPITAL | Age: 64
End: 2019-10-14
Payer: COMMERCIAL

## 2019-10-14 ENCOUNTER — APPOINTMENT (OUTPATIENT)
Dept: INTERNAL MEDICINE | Facility: CLINIC | Age: 64
End: 2019-10-14
Payer: COMMERCIAL

## 2019-10-14 VITALS
HEART RATE: 68 BPM | WEIGHT: 235 LBS | BODY MASS INDEX: 31.14 KG/M2 | OXYGEN SATURATION: 97 % | DIASTOLIC BLOOD PRESSURE: 64 MMHG | HEIGHT: 73 IN | SYSTOLIC BLOOD PRESSURE: 98 MMHG | RESPIRATION RATE: 14 BRPM | TEMPERATURE: 98.2 F

## 2019-10-14 DIAGNOSIS — Z22.321 CARRIER OR SUSPECTED CARRIER OF METHICILLIN SUSCEPTIBLE STAPHYLOCOCCUS AUREUS: ICD-10-CM

## 2019-10-14 DIAGNOSIS — Z95.5 PRESENCE OF CORONARY ANGIOPLASTY IMPLANT AND GRAFT: Chronic | ICD-10-CM

## 2019-10-14 LAB
MRSA PCR RESULT.: NEGATIVE — SIGNIFICANT CHANGE UP
S AUREUS DNA NOSE QL NAA+PROBE: POSITIVE

## 2019-10-14 PROCEDURE — 73700 CT LOWER EXTREMITY W/O DYE: CPT | Mod: 26,RT

## 2019-10-14 PROCEDURE — 99214 OFFICE O/P EST MOD 30 MIN: CPT

## 2019-10-14 PROCEDURE — 73700 CT LOWER EXTREMITY W/O DYE: CPT

## 2019-10-14 RX ORDER — ROSUVASTATIN CALCIUM 20 MG/1
20 TABLET, FILM COATED ORAL DAILY
Qty: 90 | Refills: 1 | Status: DISCONTINUED | COMMUNITY
Start: 2019-05-15 | End: 2019-10-14

## 2019-10-16 ENCOUNTER — APPOINTMENT (OUTPATIENT)
Dept: GASTROENTEROLOGY | Facility: CLINIC | Age: 64
End: 2019-10-16
Payer: COMMERCIAL

## 2019-10-16 VITALS
TEMPERATURE: 98.1 F | HEIGHT: 73 IN | OXYGEN SATURATION: 98 % | SYSTOLIC BLOOD PRESSURE: 95 MMHG | BODY MASS INDEX: 31.14 KG/M2 | DIASTOLIC BLOOD PRESSURE: 62 MMHG | HEART RATE: 75 BPM | WEIGHT: 235 LBS

## 2019-10-16 DIAGNOSIS — K20.9 ESOPHAGITIS, UNSPECIFIED: ICD-10-CM

## 2019-10-16 PROCEDURE — 99204 OFFICE O/P NEW MOD 45 MIN: CPT

## 2019-10-17 ENCOUNTER — RESULT REVIEW (OUTPATIENT)
Age: 64
End: 2019-10-17

## 2019-10-17 ENCOUNTER — RX RENEWAL (OUTPATIENT)
Age: 64
End: 2019-10-17

## 2019-10-17 LAB
FERRITIN SERPL-MCNC: 61 NG/ML
IGA SER QL IEP: 180 MG/DL
IRON SATN MFR SERPL: 36 %
IRON SERPL-MCNC: 134 UG/DL
NT-PROBNP SERPL-MCNC: 1211 PG/ML
TIBC SERPL-MCNC: 374 UG/DL
UIBC SERPL-MCNC: 240 UG/DL

## 2019-10-18 ENCOUNTER — RESULT REVIEW (OUTPATIENT)
Age: 64
End: 2019-10-18

## 2019-10-18 ENCOUNTER — OUTPATIENT (OUTPATIENT)
Dept: OUTPATIENT SERVICES | Facility: HOSPITAL | Age: 64
LOS: 1 days | Discharge: HOME | End: 2019-10-18
Payer: COMMERCIAL

## 2019-10-18 DIAGNOSIS — Z95.5 PRESENCE OF CORONARY ANGIOPLASTY IMPLANT AND GRAFT: Chronic | ICD-10-CM

## 2019-10-18 PROBLEM — K20.9 ESOPHAGITIS: Status: ACTIVE | Noted: 2019-10-18

## 2019-10-18 LAB — GLUCOSE BLDC GLUCOMTR-MCNC: 168 MG/DL — HIGH (ref 70–99)

## 2019-10-18 PROCEDURE — 82962 GLUCOSE BLOOD TEST: CPT

## 2019-10-18 PROCEDURE — 88305 TISSUE EXAM BY PATHOLOGIST: CPT

## 2019-10-18 PROCEDURE — 45378 DIAGNOSTIC COLONOSCOPY: CPT

## 2019-10-18 PROCEDURE — 88341 IMHCHEM/IMCYTCHM EA ADD ANTB: CPT

## 2019-10-18 PROCEDURE — 43239 EGD BIOPSY SINGLE/MULTIPLE: CPT

## 2019-10-18 RX ORDER — ROSUVASTATIN CALCIUM 5 MG/1
1 TABLET ORAL
Qty: 0 | Refills: 0 | DISCHARGE

## 2019-10-18 RX ORDER — POVIDONE-IODINE 5 %
1 AEROSOL (ML) TOPICAL ONCE
Refills: 0 | Status: COMPLETED | OUTPATIENT
Start: 2019-10-21 | End: 2019-10-21

## 2019-10-18 NOTE — H&P ADULT - PROBLEM SELECTOR PLAN 2
ISS/glucose monitoring/carb consistent diet on insulin pump  glucose monitoring/carb consistent diet

## 2019-10-18 NOTE — PATIENT PROFILE ADULT - NSPROIMPLANTSMEDDEV_GEN_A_NUR
Insulin pump cardiac stents, insulin pump/Insulin pump Insulin pump/cardiac stents, insulin pump, left great toe screws

## 2019-10-18 NOTE — H&P ADULT - PROBLEM SELECTOR PLAN 1
Admit to Orthopedic Service   For elective right total knee replacement with Dr. Estes   Medically cleared and optimized for surgery by Dr. Caldera of Cardiology and Dr. Younger of GI

## 2019-10-18 NOTE — H&P ADULT - NSICDXPASTSURGICALHX_GEN_ALL_CORE_FT
PAST SURGICAL HISTORY:  Other postprocedural status Fixation hardware in foot    S/P CABG x 1 2018    Stented coronary artery 10/18

## 2019-10-18 NOTE — H&P ADULT - NSHPPHYSICALEXAM_GEN_ALL_CORE
Gen: 65 y/o male, well nourished, well developed, NAD  MSK: Decreased ROM secondary to pain at the right knee   calves soft, nontender bilaterally   sensation intact to light touch bilateral upper/lower extremities  DP 2+,  brisk capillary refill  EHL/FHL/TA/GS 5/5 bilaterally     Rest of PE per MD clearance

## 2019-10-18 NOTE — H&P ADULT - HISTORY OF PRESENT ILLNESS
Patient is 63 y/o male who presents with c/o right knee pain present x  Patient elects to undergo robotic right total knee replacement with Dr. Estes today 10-21-19 Patient is 63 y/o male who presents with c/o right knee pain present x approximately 5 years. Patient reports pain came on gradually and has progressed overtime. He describes pain localized at the right knee with associated stiffness, limited mobility. Patient relates failure of conservative management including oral NSAIDs, intraarticular injections, activity modification all with limited relief of symptoms. He denies associated numbness, tingling through the lower extremities. He denies use of a cane or walker for assistance. Patient elects to undergo robotic right total knee replacement with Dr. Estes today 10-21-19.   Patient endorses recent GI illness 1 week ago that has resolved. Patient underwent colonoscopy Friday to r/o GI bleed for anemia- reports finding were negative.

## 2019-10-18 NOTE — H&P ADULT - NSICDXFAMILYHX_GEN_ALL_CORE_FT
FAMILY HISTORY:  Mother  Still living? No  Family history of heart disease, Age at diagnosis: Age Unknown  Family history of heart disease, Age at diagnosis: Age Unknown

## 2019-10-18 NOTE — H&P ADULT - NSHPLABSRESULTS_GEN_ALL_CORE
Preop CBC,  PT/INR, PTT within normal range  preop Cr 1.51, GFR 49, K 5.3- patient cleared per cardiology   UA negative   MSSA +   Preop CXR within normal limits, reviewed per medical clearance   Preop EKG : Preop CBC,  PT/INR, PTT within normal range  preop Cr 1.51, GFR 49, K 5.3- patient cleared per cardiology   UA negative   MSSA + : treated with mupirocin preoperatively   Preop CXR within normal limits, reviewed per medical clearance   Preop EKG : normal sinus rhythm, reviewed per medical clearance

## 2019-10-18 NOTE — H&P ADULT - NSICDXPASTMEDICALHX_GEN_ALL_CORE_FT
PAST MEDICAL HISTORY:  Atherosclerosis of coronary artery CAD (coronary artery disease)    Blood clot due to device, implant, or graft was on blood thinners    Chronic systolic CHF (congestive heart failure)     Diabetes     HLD (hyperlipidemia)     Osteoarthritis     Status post percutaneous transluminal coronary angioplasty in 2012

## 2019-10-18 NOTE — PATIENT PROFILE ADULT - NSPROEDALEARNPREF_GEN_A_NUR
individual instruction written material/skill demonstration/individual instruction/verbal instruction

## 2019-10-20 NOTE — HISTORY OF PRESENT ILLNESS
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 Nuno on 6/1/18.  \par sig peripheral neuropathy\par Endo- follows with Dr Cormier Last A1C - 8%\par Cardiology - follows routinely with Dr Caldera\par Podiatry appt pending before surgery. \par Denies SOB or CP.  He is limited by knee pain\par He has been on medical leave since July.

## 2019-10-21 ENCOUNTER — TRANSCRIPTION ENCOUNTER (OUTPATIENT)
Age: 64
End: 2019-10-21

## 2019-10-21 ENCOUNTER — RESULT REVIEW (OUTPATIENT)
Age: 64
End: 2019-10-21

## 2019-10-21 ENCOUNTER — LABORATORY RESULT (OUTPATIENT)
Age: 64
End: 2019-10-21

## 2019-10-21 ENCOUNTER — APPOINTMENT (OUTPATIENT)
Dept: ORTHOPEDIC SURGERY | Facility: HOSPITAL | Age: 64
End: 2019-10-21

## 2019-10-21 ENCOUNTER — INPATIENT (INPATIENT)
Facility: HOSPITAL | Age: 64
LOS: 3 days | Discharge: HOME HEALTH | DRG: 470 | End: 2019-10-25
Attending: ORTHOPAEDIC SURGERY | Admitting: ORTHOPAEDIC SURGERY
Payer: COMMERCIAL

## 2019-10-21 ENCOUNTER — MEDICATION RENEWAL (OUTPATIENT)
Age: 64
End: 2019-10-21

## 2019-10-21 VITALS
TEMPERATURE: 98 F | WEIGHT: 235.01 LBS | HEIGHT: 73 IN | DIASTOLIC BLOOD PRESSURE: 63 MMHG | HEART RATE: 77 BPM | RESPIRATION RATE: 16 BRPM | SYSTOLIC BLOOD PRESSURE: 97 MMHG | OXYGEN SATURATION: 98 %

## 2019-10-21 DIAGNOSIS — Z95.1 PRESENCE OF AORTOCORONARY BYPASS GRAFT: Chronic | ICD-10-CM

## 2019-10-21 DIAGNOSIS — M17.11 UNILATERAL PRIMARY OSTEOARTHRITIS, RIGHT KNEE: ICD-10-CM

## 2019-10-21 DIAGNOSIS — E78.5 HYPERLIPIDEMIA, UNSPECIFIED: ICD-10-CM

## 2019-10-21 DIAGNOSIS — E11.9 TYPE 2 DIABETES MELLITUS WITHOUT COMPLICATIONS: ICD-10-CM

## 2019-10-21 DIAGNOSIS — Z95.5 PRESENCE OF CORONARY ANGIOPLASTY IMPLANT AND GRAFT: Chronic | ICD-10-CM

## 2019-10-21 DIAGNOSIS — I25.10 ATHEROSCLEROTIC HEART DISEASE OF NATIVE CORONARY ARTERY WITHOUT ANGINA PECTORIS: ICD-10-CM

## 2019-10-21 DIAGNOSIS — T85.818A EMBOLISM DUE TO OTHER INTERNAL PROSTHETIC DEVICES, IMPLANTS AND GRAFTS, INITIAL ENCOUNTER: ICD-10-CM

## 2019-10-21 DIAGNOSIS — I50.22 CHRONIC SYSTOLIC (CONGESTIVE) HEART FAILURE: ICD-10-CM

## 2019-10-21 LAB
GLUCOSE BLDC GLUCOMTR-MCNC: 159 MG/DL — HIGH (ref 70–99)
GLUCOSE BLDC GLUCOMTR-MCNC: 191 MG/DL — HIGH (ref 70–99)
GLUCOSE BLDC GLUCOMTR-MCNC: 276 MG/DL — HIGH (ref 70–99)
TTG IGA SER IA-ACNC: >100 U/ML
TTG IGA SER-ACNC: POSITIVE

## 2019-10-21 PROCEDURE — S2900 ROBOTIC SURGICAL SYSTEM: CPT

## 2019-10-21 PROCEDURE — 73560 X-RAY EXAM OF KNEE 1 OR 2: CPT | Mod: 26,RT

## 2019-10-21 PROCEDURE — 27447 TOTAL KNEE ARTHROPLASTY: CPT | Mod: RT

## 2019-10-21 PROCEDURE — 20985 CPTR-ASST DIR MS PX: CPT

## 2019-10-21 RX ORDER — HYDROMORPHONE HYDROCHLORIDE 2 MG/ML
0.5 INJECTION INTRAMUSCULAR; INTRAVENOUS; SUBCUTANEOUS
Refills: 0 | Status: DISCONTINUED | OUTPATIENT
Start: 2019-10-21 | End: 2019-10-25

## 2019-10-21 RX ORDER — ASPIRIN/CALCIUM CARB/MAGNESIUM 324 MG
81 TABLET ORAL
Refills: 0 | Status: DISCONTINUED | OUTPATIENT
Start: 2019-10-21 | End: 2019-10-24

## 2019-10-21 RX ORDER — BUPIVACAINE 13.3 MG/ML
20 INJECTION, SUSPENSION, LIPOSOMAL INFILTRATION ONCE
Refills: 0 | Status: DISCONTINUED | OUTPATIENT
Start: 2019-10-21 | End: 2019-10-25

## 2019-10-21 RX ORDER — ACETAMINOPHEN 500 MG
1000 TABLET ORAL ONCE
Refills: 0 | Status: COMPLETED | OUTPATIENT
Start: 2019-10-21 | End: 2019-10-21

## 2019-10-21 RX ORDER — MORPHINE SULFATE 50 MG/1
2 CAPSULE, EXTENDED RELEASE ORAL EVERY 4 HOURS
Refills: 0 | Status: DISCONTINUED | OUTPATIENT
Start: 2019-10-21 | End: 2019-10-25

## 2019-10-21 RX ORDER — CEFAZOLIN SODIUM 1 G
2000 VIAL (EA) INJECTION EVERY 8 HOURS
Refills: 0 | Status: COMPLETED | OUTPATIENT
Start: 2019-10-21 | End: 2019-10-21

## 2019-10-21 RX ORDER — METOPROLOL TARTRATE 50 MG
100 TABLET ORAL DAILY
Refills: 0 | Status: DISCONTINUED | OUTPATIENT
Start: 2019-10-21 | End: 2019-10-23

## 2019-10-21 RX ORDER — OXYCODONE HYDROCHLORIDE 5 MG/1
5 TABLET ORAL EVERY 4 HOURS
Refills: 0 | Status: DISCONTINUED | OUTPATIENT
Start: 2019-10-21 | End: 2019-10-25

## 2019-10-21 RX ORDER — OMEGA-3 ACID ETHYL ESTERS 1 G
4 CAPSULE ORAL DAILY
Refills: 0 | Status: DISCONTINUED | OUTPATIENT
Start: 2019-10-21 | End: 2019-10-25

## 2019-10-21 RX ORDER — DEXTROSE 50 % IN WATER 50 %
25 SYRINGE (ML) INTRAVENOUS ONCE
Refills: 0 | Status: DISCONTINUED | OUTPATIENT
Start: 2019-10-21 | End: 2019-10-25

## 2019-10-21 RX ORDER — SODIUM CHLORIDE 9 MG/ML
1000 INJECTION, SOLUTION INTRAVENOUS
Refills: 0 | Status: DISCONTINUED | OUTPATIENT
Start: 2019-10-21 | End: 2019-10-22

## 2019-10-21 RX ORDER — CHLORHEXIDINE GLUCONATE 213 G/1000ML
1 SOLUTION TOPICAL ONCE
Refills: 0 | Status: COMPLETED | OUTPATIENT
Start: 2019-10-21 | End: 2019-10-21

## 2019-10-21 RX ORDER — DULOXETINE HYDROCHLORIDE 30 MG/1
90 CAPSULE, DELAYED RELEASE ORAL DAILY
Refills: 0 | Status: DISCONTINUED | OUTPATIENT
Start: 2019-10-21 | End: 2019-10-25

## 2019-10-21 RX ORDER — OXYCODONE HYDROCHLORIDE 5 MG/1
10 TABLET ORAL EVERY 4 HOURS
Refills: 0 | Status: DISCONTINUED | OUTPATIENT
Start: 2019-10-21 | End: 2019-10-25

## 2019-10-21 RX ORDER — DEXTROSE 50 % IN WATER 50 %
12.5 SYRINGE (ML) INTRAVENOUS ONCE
Refills: 0 | Status: DISCONTINUED | OUTPATIENT
Start: 2019-10-21 | End: 2019-10-25

## 2019-10-21 RX ORDER — INSULIN LISPRO 100/ML
VIAL (ML) SUBCUTANEOUS
Refills: 0 | Status: DISCONTINUED | OUTPATIENT
Start: 2019-10-21 | End: 2019-10-21

## 2019-10-21 RX ORDER — CELECOXIB 200 MG/1
100 CAPSULE ORAL
Refills: 0 | Status: DISCONTINUED | OUTPATIENT
Start: 2019-10-21 | End: 2019-10-25

## 2019-10-21 RX ORDER — GLUCAGON INJECTION, SOLUTION 0.5 MG/.1ML
1 INJECTION, SOLUTION SUBCUTANEOUS ONCE
Refills: 0 | Status: DISCONTINUED | OUTPATIENT
Start: 2019-10-21 | End: 2019-10-25

## 2019-10-21 RX ORDER — APREPITANT 80 MG/1
40 CAPSULE ORAL ONCE
Refills: 0 | Status: COMPLETED | OUTPATIENT
Start: 2019-10-21 | End: 2019-10-21

## 2019-10-21 RX ORDER — SENNA PLUS 8.6 MG/1
2 TABLET ORAL AT BEDTIME
Refills: 0 | Status: DISCONTINUED | OUTPATIENT
Start: 2019-10-21 | End: 2019-10-25

## 2019-10-21 RX ORDER — DEXTROSE 50 % IN WATER 50 %
15 SYRINGE (ML) INTRAVENOUS ONCE
Refills: 0 | Status: DISCONTINUED | OUTPATIENT
Start: 2019-10-21 | End: 2019-10-25

## 2019-10-21 RX ORDER — POLYETHYLENE GLYCOL 3350 17 G/17G
17 POWDER, FOR SOLUTION ORAL DAILY
Refills: 0 | Status: DISCONTINUED | OUTPATIENT
Start: 2019-10-21 | End: 2019-10-25

## 2019-10-21 RX ORDER — SODIUM CHLORIDE 9 MG/ML
1000 INJECTION, SOLUTION INTRAVENOUS
Refills: 0 | Status: DISCONTINUED | OUTPATIENT
Start: 2019-10-21 | End: 2019-10-25

## 2019-10-21 RX ORDER — MAGNESIUM HYDROXIDE 400 MG/1
30 TABLET, CHEWABLE ORAL DAILY
Refills: 0 | Status: DISCONTINUED | OUTPATIENT
Start: 2019-10-21 | End: 2019-10-22

## 2019-10-21 RX ORDER — PANTOPRAZOLE SODIUM 20 MG/1
40 TABLET, DELAYED RELEASE ORAL
Refills: 0 | Status: DISCONTINUED | OUTPATIENT
Start: 2019-10-21 | End: 2019-10-25

## 2019-10-21 RX ORDER — ACETAMINOPHEN 500 MG
650 TABLET ORAL EVERY 6 HOURS
Refills: 0 | Status: DISCONTINUED | OUTPATIENT
Start: 2019-10-21 | End: 2019-10-25

## 2019-10-21 RX ORDER — ATORVASTATIN CALCIUM 80 MG/1
80 TABLET, FILM COATED ORAL AT BEDTIME
Refills: 0 | Status: DISCONTINUED | OUTPATIENT
Start: 2019-10-21 | End: 2019-10-25

## 2019-10-21 RX ORDER — HYDROMORPHONE HYDROCHLORIDE 2 MG/ML
0.5 INJECTION INTRAMUSCULAR; INTRAVENOUS; SUBCUTANEOUS EVERY 4 HOURS
Refills: 0 | Status: DISCONTINUED | OUTPATIENT
Start: 2019-10-21 | End: 2019-10-25

## 2019-10-21 RX ORDER — OXYCODONE HYDROCHLORIDE 5 MG/1
10 TABLET ORAL
Refills: 0 | Status: DISCONTINUED | OUTPATIENT
Start: 2019-10-21 | End: 2019-10-23

## 2019-10-21 RX ORDER — CEFAZOLIN SODIUM 1 G
2000 VIAL (EA) INJECTION EVERY 8 HOURS
Refills: 0 | Status: DISCONTINUED | OUTPATIENT
Start: 2019-10-21 | End: 2019-10-21

## 2019-10-21 RX ORDER — DOCUSATE SODIUM 100 MG
100 CAPSULE ORAL THREE TIMES A DAY
Refills: 0 | Status: DISCONTINUED | OUTPATIENT
Start: 2019-10-21 | End: 2019-10-22

## 2019-10-21 RX ORDER — ONDANSETRON 8 MG/1
4 TABLET, FILM COATED ORAL EVERY 6 HOURS
Refills: 0 | Status: DISCONTINUED | OUTPATIENT
Start: 2019-10-21 | End: 2019-10-25

## 2019-10-21 RX ADMIN — Medication 2000 MILLIGRAM(S): at 23:14

## 2019-10-21 RX ADMIN — SODIUM CHLORIDE 80 MILLILITER(S): 9 INJECTION, SOLUTION INTRAVENOUS at 16:14

## 2019-10-21 RX ADMIN — Medication 650 MILLIGRAM(S): at 23:14

## 2019-10-21 RX ADMIN — APREPITANT 40 MILLIGRAM(S): 80 CAPSULE ORAL at 08:19

## 2019-10-21 RX ADMIN — Medication 2000 MILLIGRAM(S): at 16:08

## 2019-10-21 RX ADMIN — OXYCODONE HYDROCHLORIDE 10 MILLIGRAM(S): 5 TABLET ORAL at 23:10

## 2019-10-21 RX ADMIN — ATORVASTATIN CALCIUM 80 MILLIGRAM(S): 80 TABLET, FILM COATED ORAL at 22:10

## 2019-10-21 RX ADMIN — OXYCODONE HYDROCHLORIDE 10 MILLIGRAM(S): 5 TABLET ORAL at 22:10

## 2019-10-21 RX ADMIN — Medication 1000 MILLIGRAM(S): at 08:20

## 2019-10-21 RX ADMIN — Medication 1000 MILLIGRAM(S): at 08:21

## 2019-10-21 RX ADMIN — CHLORHEXIDINE GLUCONATE 1 APPLICATION(S): 213 SOLUTION TOPICAL at 06:34

## 2019-10-21 RX ADMIN — Medication 6: at 16:14

## 2019-10-21 NOTE — DISCHARGE NOTE PROVIDER - CARE PROVIDERS DIRECT ADDRESSES
,[email or web address]

## 2019-10-21 NOTE — PROGRESS NOTE ADULT - SUBJECTIVE AND OBJECTIVE BOX
Ortho Post Op Check    Procedure: R TKA  Surgeon: Franklyn    Pt comfortable without complaints, pain controlled  Denies CP, SOB, N/V, numbness/tingling   Earlier in PACU, patient's DP pulse on L leg could not be appreciated either via palpation or on Doppler  Patient was seen by vascular surgery team who found biphasic DP pulse on exam but not palpable  Patient without complaints/symptoms concerning for acute limb ischemia    Vital Signs Last 24 Hrs  T(C): 36.7 (10-21-19 @ 18:03), Max: 36.7 (10-21-19 @ 18:03)  T(F): 98.1 (10-21-19 @ 18:03), Max: 98.1 (10-21-19 @ 18:03)  HR: 86 (10-21-19 @ 18:03) (80 - 86)  BP: 138/67 (10-21-19 @ 18:03) (138/67 - 158/62)  BP(mean): 89 (10-21-19 @ 15:53) (89 - 89)  RR: 17 (10-21-19 @ 18:03) (17 - 18)  SpO2: 97% (10-21-19 @ 18:03) (96% - 97%)    AVSS  General: Pt Alert and oriented, NAD  RLE: [employer] DSG C/D/I  Pulses: Foot mildly cooler than LLE; Palpable DP pulse; Cap refill < 2 sec  Sensation: Sensation mildly decreased over b/l plantar surfaces consistent with baseline as per patient; Otherwise SILT and symmetric  Motor: EHL/FHL/TA/GS 5/5 and symmetric to contralateral extremity              Post-op X-Ray: R TKA with components in place

## 2019-10-21 NOTE — CONSULT NOTE ADULT - ASSESSMENT
64 M s/p right knee arthroplasty. Vascular surgery consulted for cold right foot s/p surgery. On examination patient denies any sensory loss, no motor deficit. VAISHALI 0.9 on right leg. PT pulses 2+ on right foot. Biphasic DP signals. Low likelihood of Acute limb ischemia. Will monitor and repeat physical examination to assess for perfusion.    patient seen and examined with chief resident   Further recommendations to follow after d/w attending 64 M s/p right knee arthroplasty. Vascular surgery consulted for cold right foot s/p surgery. On examination patient denies any sensory loss, no motor deficit. VAISHALI 0.9 on right leg. PT pulses 2+ on right foot. Biphasic DP signals. Low likelihood of Acute limb ischemia. Will monitor and repeat physical examination to assess for perfusion.   Upon repeat examination, DP on right 1+ palpable.     patient seen and examined with chief resident   Vascular to sign off, please call for any changes in physical examination or patients symptoms and signs

## 2019-10-21 NOTE — DISCHARGE NOTE PROVIDER - PROVIDER TOKENS
PROVIDER:[TOKEN:[[id number]]] PROVIDER:[TOKEN:[[id number]]],PROVIDER:[TOKEN:[[id number]],FOLLOWUP:[2 weeks]],PROVIDER:[TOKEN:[[id number]],FOLLOWUP:[Routine]]

## 2019-10-21 NOTE — BRIEF OPERATIVE NOTE - NSICDXBRIEFPOSTOP_GEN_ALL_CORE_FT
POST-OP DIAGNOSIS:  Primary osteoarthritis of right knee 21-Oct-2019 11:51:18 Primary osteoarthritis of right knee Eliezer Rodriguez

## 2019-10-21 NOTE — BRIEF OPERATIVE NOTE - NSICDXBRIEFPREOP_GEN_ALL_CORE_FT
PRE-OP DIAGNOSIS:  Primary osteoarthritis of right knee 21-Oct-2019 11:51:23 Primary osteoarthritis of right knee Eliezer Rodriguez

## 2019-10-21 NOTE — DISCHARGE NOTE PROVIDER - NSDCFUADDINST_GEN_ALL_CORE_FT
***See Dr. Estes's attached discharge instructions*** **Please see Dr. Estes's separate discharge instructions sheet. Your medications were sent to [facility], located on the first floor of [facility]. Take medications as prescribed.  __________________________________  Weight bear as tolerated with assistive device.  No strenuous activity, heavy lifting, driving or returning to work until cleared by MD.  You may shower - dressing is water-resistant, no soaking in bathtubs.  Remove dressing after post op day 5-7, then leave incision open to air. Keep incision clean and dry.  Try to have regular bowel movements, take stool softener or laxative if necessary.    Swelling may travel all the way down leg to foot, this is normal and will subside in a few weeks.  Call to schedule an appt with Dr. Estes for follow up, if you have staples or sutures they will be removed in office.  Contact your doctor if you experience: fever greater than 101.5, chills, chest pain, difficulty breathing, redness or excessive drainage around the incision, other concerns.  Follow up with your primary care provider. **Please see Dr. Estes's separate discharge instructions sheet. Your medications were sent to [facility], located on the first floor of [facility]. Take medications as prescribed.  _________________________________  In the hospital, your feet were cool to touch, and your pulses were palpable, but diminished. The vascular team saw you post-operatively and did an VAISHALI, which was borderline at 0.9  It is also important you follow-up with Dr. Malloy (vascular), and Dr. Caldera (cardiology) outpatient within the next couple of weeks.  __________________________________  Weight bear as tolerated with assistive device.  No strenuous activity, heavy lifting, driving or returning to work until cleared by MD.  You may shower - dressing is water-resistant, no soaking in bathtubs.  Remove dressing after post op day 5-7, then leave incision open to air. Keep incision clean and dry.  Try to have regular bowel movements, take stool softener or laxative if necessary.    Swelling may travel all the way down leg to foot, this is normal and will subside in a few weeks.  Call to schedule an appt with Dr. Estes for follow up, if you have staples or sutures they will be removed in office.  Contact your doctor if you experience: fever greater than 101.5, chills, chest pain, difficulty breathing, redness or excessive drainage around the incision, other concerns.  Follow up with your primary care provider. **Please see Dr. Estes's separate discharge instructions sheet. Your medications were sent to [facility], located on the first floor of [facility]. Take medications as prescribed.  _________________________________  In the hospital, your feet were cool to touch, and your pulses were palpable, but diminished. The vascular team saw you post-operatively and did an VAISHALI, which was borderline at 0.9  It is also important you follow-up with Dr. Malloy (vascular), and Dr. Caldera (cardiology) outpatient within the next couple of weeks. You should also follow-up with your endocrinologist who manages your diabetes.  __________________________________  Weight bear as tolerated with assistive device.  No strenuous activity, heavy lifting, driving or returning to work until cleared by MD.  You may shower - dressing is water-resistant, no soaking in bathtubs.  Remove dressing after post op day 5-7, then leave incision open to air. Keep incision clean and dry.  Try to have regular bowel movements, take stool softener or laxative if necessary.    Swelling may travel all the way down leg to foot, this is normal and will subside in a few weeks.  Call to schedule an appt with Dr. Estes for follow up, if you have staples or sutures they will be removed in office.  Contact your doctor if you experience: fever greater than 101.5, chills, chest pain, difficulty breathing, redness or excessive drainage around the incision, other concerns.  Follow up with your primary care provider.

## 2019-10-21 NOTE — DISCHARGE NOTE PROVIDER - HOSPITAL COURSE
Admit to Orthopaedics for right total knee replacement     Perioperative Antibiotics    DVT prophylaxis    Physical Therapy    Pain Management

## 2019-10-21 NOTE — DISCHARGE NOTE PROVIDER - CARE PROVIDER_API CALL
Santos Estes)  Orthopaedic Surgery  [address] [place] [address]  Phone: [phone]  Fax: [phone]  Follow Up Time: Santos Estes)  Orthopaedic Surgery  [address], [address] [place] [address]  Phone: [phone]  Fax: [phone]  Follow Up Time:     Noel Malloy)  Surgery; Vascular Surgery  [address], [address] [place] [address]  Phone: [phone]  Fax: [phone]  Follow Up Time: 2 weeks    Lilia Caldera)  Cardiovascular Disease; Internal Medicine  [address], [address] [place] [address]  Phone: [phone]  Fax: [phone]  Follow Up Time: Routine

## 2019-10-21 NOTE — CONSULT NOTE ADULT - SUBJECTIVE AND OBJECTIVE BOX
64 M with PMHx as noted below and significant for CAD(MI) s/p CABG, thrombosed coronary artery stent, DM2 on insulin. He underwent right knee arthroplasty on 10/21/19.  Vascular surgery consulted for cold right foot and absence of palpable dorsalis pedis pulses in right foot post operatively. He denies any pain       PAST MEDICAL & SURGICAL HISTORY:  Diabetes  Blood clot due to device, implant, or graft: was on blood thinners  HLD (hyperlipidemia)  Chronic systolic CHF (congestive heart failure)  Osteoarthritis  Atherosclerosis of coronary artery: CAD (coronary artery disease)  Status post percutaneous transluminal coronary angioplasty: in 2012  S/P CABG x 1: 2018  Stented coronary artery: 10/18 heart attack  INFERIOR WALL MI  Other postprocedural status: Fixation hardware in foot LEFT 64 M with PMHx as noted below and significant for CAD(MI) s/p CABG, thrombosed coronary artery stent, DM2 on insulin. He underwent right knee arthroplasty on 10/21/19.  Vascular surgery consulted for cold right foot and absence of palpable dorsalis pedis pulses in right foot post operatively. He denies any pain, sensory or motor loss in his right foot.      PAST MEDICAL & SURGICAL HISTORY:  Diabetes  Blood clot due to device, implant, or graft: was on blood thinners  HLD (hyperlipidemia)  Chronic systolic CHF (congestive heart failure)  Osteoarthritis  Atherosclerosis of coronary artery: CAD (coronary artery disease)  Status post percutaneous transluminal coronary angioplasty: in 2012  S/P CABG x 1: 2018  Stented coronary artery: 10/18 heart attack  INFERIOR WALL MI  Other postprocedural status: Fixation hardware in foot LEFT    General: No acute distress  Neurology: Awake  Eyes: Scleras clear  Respiratory: Normal work of breathing  CV: RRR  Abdominal: Soft, NT  Extremities: No edema, b/l femoral, popliteal 2+, lt dp/pt 2+, right dp biphasic signals, right pt palpable, right foot cold to touch, no sensory loss, can wiggle toes  Psych: Oriented

## 2019-10-22 LAB
ANION GAP SERPL CALC-SCNC: 11 MMOL/L — SIGNIFICANT CHANGE UP (ref 5–17)
BUN SERPL-MCNC: 19 MG/DL — SIGNIFICANT CHANGE UP (ref 7–23)
CALCIUM SERPL-MCNC: 8.8 MG/DL — SIGNIFICANT CHANGE UP (ref 8.4–10.5)
CHLORIDE SERPL-SCNC: 105 MMOL/L — SIGNIFICANT CHANGE UP (ref 96–108)
CO2 SERPL-SCNC: 23 MMOL/L — SIGNIFICANT CHANGE UP (ref 22–31)
CREAT SERPL-MCNC: 0.97 MG/DL — SIGNIFICANT CHANGE UP (ref 0.5–1.3)
GLUCOSE BLDC GLUCOMTR-MCNC: 183 MG/DL — HIGH (ref 70–99)
GLUCOSE BLDC GLUCOMTR-MCNC: 189 MG/DL — HIGH (ref 70–99)
GLUCOSE BLDC GLUCOMTR-MCNC: 209 MG/DL — HIGH (ref 70–99)
GLUCOSE BLDC GLUCOMTR-MCNC: 221 MG/DL — HIGH (ref 70–99)
GLUCOSE SERPL-MCNC: 207 MG/DL — HIGH (ref 70–99)
HBA1C BLD-MCNC: 7.5 % — HIGH (ref 4–5.6)
HCT VFR BLD CALC: 41.4 % — SIGNIFICANT CHANGE UP (ref 39–50)
HCV AB S/CO SERPL IA: 0.15 S/CO — SIGNIFICANT CHANGE UP
HCV AB SERPL-IMP: SIGNIFICANT CHANGE UP
HGB BLD-MCNC: 12.9 G/DL — LOW (ref 13–17)
MCHC RBC-ENTMCNC: 25.9 PG — LOW (ref 27–34)
MCHC RBC-ENTMCNC: 31.2 GM/DL — LOW (ref 32–36)
MCV RBC AUTO: 83.1 FL — SIGNIFICANT CHANGE UP (ref 80–100)
NRBC # BLD: 0 /100 WBCS — SIGNIFICANT CHANGE UP (ref 0–0)
PLATELET # BLD AUTO: 194 K/UL — SIGNIFICANT CHANGE UP (ref 150–400)
POTASSIUM SERPL-MCNC: 4.3 MMOL/L — SIGNIFICANT CHANGE UP (ref 3.5–5.3)
POTASSIUM SERPL-SCNC: 4.3 MMOL/L — SIGNIFICANT CHANGE UP (ref 3.5–5.3)
RBC # BLD: 4.98 M/UL — SIGNIFICANT CHANGE UP (ref 4.2–5.8)
RBC # FLD: 23.3 % — HIGH (ref 10.3–14.5)
SODIUM SERPL-SCNC: 139 MMOL/L — SIGNIFICANT CHANGE UP (ref 135–145)
SURGICAL PATHOLOGY STUDY: SIGNIFICANT CHANGE UP
WBC # BLD: 14.17 K/UL — HIGH (ref 3.8–10.5)
WBC # FLD AUTO: 14.17 K/UL — HIGH (ref 3.8–10.5)

## 2019-10-22 PROCEDURE — 99231 SBSQ HOSP IP/OBS SF/LOW 25: CPT

## 2019-10-22 RX ORDER — CEFPODOXIME PROXETIL 100 MG
200 TABLET ORAL EVERY 12 HOURS
Refills: 0 | Status: DISCONTINUED | OUTPATIENT
Start: 2019-10-22 | End: 2019-10-25

## 2019-10-22 RX ORDER — PANTOPRAZOLE SODIUM 20 MG/1
1 TABLET, DELAYED RELEASE ORAL
Qty: 28 | Refills: 0
Start: 2019-10-22 | End: 2019-11-18

## 2019-10-22 RX ORDER — SODIUM CHLORIDE 9 MG/ML
1000 INJECTION INTRAMUSCULAR; INTRAVENOUS; SUBCUTANEOUS
Refills: 0 | Status: COMPLETED | OUTPATIENT
Start: 2019-10-22 | End: 2019-10-22

## 2019-10-22 RX ORDER — CELECOXIB 200 MG/1
1 CAPSULE ORAL
Qty: 56 | Refills: 0
Start: 2019-10-22 | End: 2019-11-18

## 2019-10-22 RX ORDER — ACETAMINOPHEN 500 MG
2 TABLET ORAL
Qty: 0 | Refills: 0 | DISCHARGE
Start: 2019-10-22

## 2019-10-22 RX ORDER — SACCHAROMYCES BOULARDII 250 MG
250 POWDER IN PACKET (EA) ORAL
Refills: 0 | Status: DISCONTINUED | OUTPATIENT
Start: 2019-10-22 | End: 2019-10-25

## 2019-10-22 RX ORDER — INSULIN ASPART 100 [IU]/ML
1 INJECTION, SOLUTION SUBCUTANEOUS
Refills: 0 | Status: DISCONTINUED | OUTPATIENT
Start: 2019-10-22 | End: 2019-10-23

## 2019-10-22 RX ORDER — DEXTROSE 10 % IN WATER 10 %
125 INTRAVENOUS SOLUTION INTRAVENOUS ONCE
Refills: 0 | Status: DISCONTINUED | OUTPATIENT
Start: 2019-10-22 | End: 2019-10-25

## 2019-10-22 RX ORDER — DEXTROSE 10 % IN WATER 10 %
250 INTRAVENOUS SOLUTION INTRAVENOUS ONCE
Refills: 0 | Status: DISCONTINUED | OUTPATIENT
Start: 2019-10-22 | End: 2019-10-25

## 2019-10-22 RX ORDER — SENNA PLUS 8.6 MG/1
2 TABLET ORAL
Qty: 0 | Refills: 0 | DISCHARGE
Start: 2019-10-22

## 2019-10-22 RX ORDER — OXYCODONE HYDROCHLORIDE 5 MG/1
1 TABLET ORAL
Qty: 7 | Refills: 0
Start: 2019-10-22 | End: 2019-10-28

## 2019-10-22 RX ORDER — POLYETHYLENE GLYCOL 3350 17 G/17G
17 POWDER, FOR SOLUTION ORAL
Qty: 0 | Refills: 0 | DISCHARGE
Start: 2019-10-22

## 2019-10-22 RX ADMIN — CELECOXIB 100 MILLIGRAM(S): 200 CAPSULE ORAL at 19:14

## 2019-10-22 RX ADMIN — OXYCODONE HYDROCHLORIDE 10 MILLIGRAM(S): 5 TABLET ORAL at 13:50

## 2019-10-22 RX ADMIN — Medication 650 MILLIGRAM(S): at 06:08

## 2019-10-22 RX ADMIN — Medication 650 MILLIGRAM(S): at 07:04

## 2019-10-22 RX ADMIN — SODIUM CHLORIDE 60 MILLILITER(S): 9 INJECTION INTRAMUSCULAR; INTRAVENOUS; SUBCUTANEOUS at 12:38

## 2019-10-22 RX ADMIN — DULOXETINE HYDROCHLORIDE 90 MILLIGRAM(S): 30 CAPSULE, DELAYED RELEASE ORAL at 12:38

## 2019-10-22 RX ADMIN — OXYCODONE HYDROCHLORIDE 10 MILLIGRAM(S): 5 TABLET ORAL at 12:38

## 2019-10-22 RX ADMIN — Medication 650 MILLIGRAM(S): at 23:52

## 2019-10-22 RX ADMIN — Medication 4 GRAM(S): at 12:38

## 2019-10-22 RX ADMIN — OXYCODONE HYDROCHLORIDE 10 MILLIGRAM(S): 5 TABLET ORAL at 21:02

## 2019-10-22 RX ADMIN — Medication 650 MILLIGRAM(S): at 19:18

## 2019-10-22 RX ADMIN — Medication 81 MILLIGRAM(S): at 19:18

## 2019-10-22 RX ADMIN — CELECOXIB 100 MILLIGRAM(S): 200 CAPSULE ORAL at 07:04

## 2019-10-22 RX ADMIN — CELECOXIB 100 MILLIGRAM(S): 200 CAPSULE ORAL at 21:03

## 2019-10-22 RX ADMIN — Medication 100 MILLIGRAM(S): at 06:08

## 2019-10-22 RX ADMIN — OXYCODONE HYDROCHLORIDE 10 MILLIGRAM(S): 5 TABLET ORAL at 23:52

## 2019-10-22 RX ADMIN — OXYCODONE HYDROCHLORIDE 10 MILLIGRAM(S): 5 TABLET ORAL at 21:03

## 2019-10-22 RX ADMIN — PANTOPRAZOLE SODIUM 40 MILLIGRAM(S): 20 TABLET, DELAYED RELEASE ORAL at 06:08

## 2019-10-22 RX ADMIN — Medication 250 MILLIGRAM(S): at 19:17

## 2019-10-22 RX ADMIN — CELECOXIB 100 MILLIGRAM(S): 200 CAPSULE ORAL at 06:07

## 2019-10-22 RX ADMIN — Medication 650 MILLIGRAM(S): at 01:07

## 2019-10-22 RX ADMIN — Medication 650 MILLIGRAM(S): at 12:47

## 2019-10-22 RX ADMIN — ATORVASTATIN CALCIUM 80 MILLIGRAM(S): 80 TABLET, FILM COATED ORAL at 21:03

## 2019-10-22 RX ADMIN — Medication 650 MILLIGRAM(S): at 21:03

## 2019-10-22 RX ADMIN — Medication 81 MILLIGRAM(S): at 06:08

## 2019-10-22 NOTE — PHYSICAL THERAPY INITIAL EVALUATION ADULT - ADDITIONAL COMMENTS
house, 5 steps to enter in front, 2 steps with handrail on side entrance, 8 steps x 8 steps to enter main bedroom, alternating handrail, independent prior to arrival
house, 6 steps to enter in front, 2 steps with handrail on side entrance, 8 steps x 8 steps to enter main bedroom, alternating handrail, independent prior to arrival

## 2019-10-22 NOTE — PROGRESS NOTE ADULT - SUBJECTIVE AND OBJECTIVE BOX
Ortho Note    Pt seen and examined this AM, and again after PT. Comfortable without complaints, pain controlled  Recently had episode of dizziness/ orthostatic hypotension with PT, but currently feels "fine"  Denies CP, SOB, N/V, numbness/tingling     Vital Signs Last 24 Hrs  T(C): 36.6 (10-22-19 @ 09:30), Max: 36.6 (10-22-19 @ 09:30)  T(F): 97.8 (10-22-19 @ 09:30), Max: 97.8 (10-22-19 @ 09:30)  HR: 63 (10-22-19 @ 09:30) (63 - 63)  BP: 94/57 (10-22-19 @ 09:30) (94/57 - 94/57)  BP(mean): --  RR: 17 (10-22-19 @ 09:30) (17 - 17)  SpO2: 99% (10-22-19 @ 09:30) (99% - 99%)  AVSS    focused exam:  General: Pt Alert and oriented, NAD  DSG- aquacel to R knee CDI  Pulses: +DP RLE, WWP toes  Sensation: decreased to plantar surfaces bilaterally (baseline), SILT in rest of BLE  Motor: 5/5 EHL/FHL/TA/GS                          12.9   14.17 )-----------( 194      ( 22 Oct 2019 07:00 )             41.4   22 Oct 2019 07:00    139    |  105    |  19     ----------------------------<  207    4.3     |  23     |  0.97     Ca    8.8        22 Oct 2019 07:00        A/P: 64yMale POD#1 s/p right total knee replacement  - Stable  - Pain Control- continue current regimen, pain-well controlled; avoid toradol for possible h/o mild CKD  - DVT ppx: ASA 81 mg bid, to go back on home regimen of effient upon d/c  - PT, WBS: WBAT RLE  - pre-op flexion contracture- continue bone foam to promote passive knee extension  - diarrhea x1 last night- likely non-infectious; began after colonoscopy done last week. Denies fever, nausea, vomiting, abd pain. Hold stool softeners. Probiotics initiated.  - orthostatic hypotension: NS @60 started. Monitor for fluid overload.  - dispo: home with [facility]    Ortho Pager [phone] Ortho Note    Pt seen and examined this AM, and again after PT. Comfortable without complaints, pain controlled  Recently had episode of dizziness/ orthostatic hypotension with PT, but currently feels "fine". Patient uses   own insulin pump, and states he is using his regular 4 basal units without bolusing since he hasn't eaten much.  Sugars are currently well-controlled.  Denies CP, SOB, N/V, numbness/tingling     Vital Signs Last 24 Hrs  T(C): 36.6 (10-22-19 @ 09:30), Max: 36.6 (10-22-19 @ 09:30)  T(F): 97.8 (10-22-19 @ 09:30), Max: 97.8 (10-22-19 @ 09:30)  HR: 63 (10-22-19 @ 09:30) (63 - 63)  BP: 94/57 (10-22-19 @ 09:30) (94/57 - 94/57)  BP(mean): --  RR: 17 (10-22-19 @ 09:30) (17 - 17)  SpO2: 99% (10-22-19 @ 09:30) (99% - 99%)  AVSS    focused exam:  General: Pt Alert and oriented, NAD  DSG- aquacel to R knee CDI  Pulses: +DP RLE, WWP toes  Sensation: decreased to plantar surfaces bilaterally (baseline), SILT in rest of BLE  Motor: 5/5 EHL/FHL/TA/GS                          12.9   14.17 )-----------( 194      ( 22 Oct 2019 07:00 )             41.4   22 Oct 2019 07:00    139    |  105    |  19     ----------------------------<  207    4.3     |  23     |  0.97     Ca    8.8        22 Oct 2019 07:00        A/P: 64yMale POD#1 s/p right total knee replacement  - Stable  - Pain Control- continue current regimen, pain-well controlled; avoid toradol for possible h/o mild CKD  - DVT ppx: ASA 81 mg bid, to go back on home regimen of effient upon d/c  - PT, WBS: WBAT RLE  - pre-op flexion contracture- continue bone foam to promote passive knee extension  - diarrhea x1 last night- likely non-infectious; began after colonoscopy done last week. Denies fever, nausea, vomiting, abd pain. Hold stool softeners. Probiotics initiated.  - orthostatic hypotension: NS @60 started. Monitor for fluid overload  - continue patient insulin pump- appreciate endocrine recs  - dispo: home with HPT    Ortho Pager [phone]

## 2019-10-22 NOTE — PHYSICAL THERAPY INITIAL EVALUATION ADULT - PLANNED THERAPY INTERVENTIONS, PT EVAL
stair training/balance training/gait training/bed mobility training/transfer training/ROM/strengthening

## 2019-10-22 NOTE — CONSULT NOTE ADULT - ATTENDING COMMENTS
This patient was seen and examined at the bedside with Dr. Cardenas,endocrinology Fellow. The patient is S/P right knee repalcement yesterday. He has been on Insulin for six years for Type II diabetes mellitus.He has been on the Insulin pump. However,since his pump replacement he is not using the automatic mode on his 670  [employer] pump. He had been seen by my pump , Michelle Leyva 6 months ago. He needs further pump training for the 670. He has also been taking Trulicity once a week and Januvia .His glucoses were [phone] yesterday and [phone] today.He may continue on his Insulin pump in nonautomatic mode.

## 2019-10-22 NOTE — CONSULT NOTE ADULT - SUBJECTIVE AND OBJECTIVE BOX
HPI: 64yMale    Age at Dx:  How dx:  Hx and duration of insulin:  Current Therapy:  Hx of hypoglycemia  Hx of DKA/HHS?    Home FSG:  Fasting  Lunch  Dinner  Bed    Hx of other regimens  Complications:  Outpatient Endo:    [facility] Surgical Hx:M17.11  Family history of heart disease (Mother)  Family history of heart disease (Mother)  Handoff  MEWS Score  Diabetes  Blood clot due to device, implant, or graft  CKD (chronic kidney disease) stage 2, GFR 60-89 ml/min  COPD, moderate  Hyperkalemia  HLD (hyperlipidemia)  Chronic systolic CHF (congestive heart failure)  Osteoarthritis  HTN (hypertension)  Type 2 diabetes mellitus with neurological manifestations  Type 2 diabetes mellitus  History of surgery to heart and great vessels, presenting hazards to health  Atherosclerosis of coronary artery  Status post percutaneous transluminal coronary angioplasty  Primary osteoarthritis of right knee  Primary osteoarthritis of right knee  Right total knee replacement  Primary osteoarthritis of right knee  Blood clot due to device, implant, or graft  Atherosclerosis of coronary artery  Chronic systolic CHF (congestive heart failure)  HLD (hyperlipidemia)  Diabetes  Primary osteoarthritis of right knee  Right total knee arthroplasty  S/P CABG x 1  Stented coronary artery  History of surgery to major organs, presenting hazards to health  Other postprocedural status      FH:  DM:  Thyroid:  Autoimmune:  Other:    SH:  Smoking  Etoh:  Recreational Drugs:  Social Life:    Current Meds:  acetaminophen   Tablet .. 650 milliGRAM(s) Oral every 6 hours  aluminum hydroxide/magnesium hydroxide/simethicone Suspension 30 milliLiter(s) Oral four times a day PRN  aspirin enteric coated 81 milliGRAM(s) Oral two times a day  atorvastatin 80 milliGRAM(s) Oral at bedtime  BUpivacaine liposome 1.3% Injectable (no eMAR) 20 milliLiter(s) Local Injection once  cefpodoxime 200 milliGRAM(s) Oral every 12 hours  celecoxib 100 milliGRAM(s) Oral two times a day  dextrose 40% Gel 15 Gram(s) Oral once PRN  dextrose 5%. 1000 milliLiter(s) IV Continuous <Continuous>  dextrose 50% Injectable 12.5 Gram(s) IV Push once  dextrose 50% Injectable 25 Gram(s) IV Push once  dextrose 50% Injectable 25 Gram(s) IV Push once  DULoxetine 90 milliGRAM(s) Oral daily  glucagon  Injectable 1 milliGRAM(s) IntraMuscular once PRN  HYDROmorphone  Injectable 0.5 milliGRAM(s) IV Push every 4 hours PRN  HYDROmorphone  Injectable 0.5 milliGRAM(s) IV Push every 30 minutes PRN  metoprolol succinate  milliGRAM(s) Oral daily  morphine  - Injectable 2 milliGRAM(s) IV Push every 4 hours PRN  omega-3-Acid Ethyl Esters 4 Gram(s) Oral daily  ondansetron Injectable 4 milliGRAM(s) IV Push every 6 hours PRN  oxyCODONE    IR 5 milliGRAM(s) Oral every 4 hours PRN  oxyCODONE    IR 10 milliGRAM(s) Oral every 4 hours PRN  oxyCODONE  ER Tablet 10 milliGRAM(s) Oral <User Schedule>  pantoprazole    Tablet 40 milliGRAM(s) Oral before breakfast  polyethylene glycol 3350 17 Gram(s) Oral daily  saccharomyces boulardii 250 milliGRAM(s) Oral two times a day  senna 2 Tablet(s) Oral at bedtime PRN      Allergies:  ACE inhibitors (Hives)  enalapril (Hives)      ROS:  Denies the following except as indicated.    General: weight loss/weight gain, decreased appetite, fatigue  Eyes: Blurry vision, double vision, visual changes  ENT: Throat pain, changes in voice,   CV: palpitations, SOB, CP, cough  GI: NVD, difficulty swallowing, abdominal pain  : polyuria, dysuria  Endo: abnormal menses, temperature intolerance, decreased libido  MSK: weakness, joint pain  Skin: rash, dryness, diaphoresis  Heme: Easy bruising,bleeding  Neuro: HA, dizziness, lightheadedness, numbness tingling  Psych: Anxiety, Depression    Vital Signs Last 24 Hrs  T(C): 36.6 (22 Oct 2019 09:30), Max: 36.9 (21 Oct 2019 21:41)  T(F): 97.8 (22 Oct 2019 09:30), Max: 98.5 (21 Oct 2019 21:41)  HR: 74 (22 Oct 2019 10:02) (56 - 86)  BP: 85/53 (22 Oct 2019 10:02) (72/47 - 158/62)  BP(mean): 89 (21 Oct 2019 15:53) (89 - 89)  RR: 17 (22 Oct 2019 09:30) (15 - 18)  SpO2: 99% (22 Oct 2019 09:30) (96% - 99%)  Height (cm): 185.42 (10-21 @ 06:56)  Weight (kg): 106.6 (10-21 @ 06:56)  BMI (kg/m2): 31 (10-21 @ 06:56)      Constitutional: wn/wd in NAD.   HEENT: NCAT, MMM, OP clear, EOMI, , no proptosis or lid retraction  Neck: no thyromegaly or palpable thyroid nodules   Respiratory: lungs CTAB.  Cardiovascular: regular rhythm, normal S1 and S2, no audible murmurs, no peripheral edema  GI: soft, NT/ND, no masses/HSM appreciated.  Neurology: no tremors, DTR 2+  Skin: no visible rashes/lesions  Psychiatric: AAO x 3, normal affect/mood.  Ext: radial pulses intact, DP pulses intact, extremities warm, no cyanosis, clubbing or edema.       LABS:                        12.9   14.17 )-----------( 194      ( 22 Oct 2019 07:00 )             41.4     10-22    139  |  105  |  19  ----------------------------<  207<H>  4.3   |  23  |  0.97    Ca    8.8      22 Oct 2019 07:00          Hemoglobin A1C, Whole Blood: 7.5 (10-22 @ 07:00)        RADIOLOGY & ADDITIONAL STUDIES:  CAPILLARY BLOOD GLUCOSE      POCT Blood Glucose.: 189 mg/dL (22 Oct 2019 12:04)  POCT Blood Glucose.: 183 mg/dL (22 Oct 2019 06:53)  POCT Blood Glucose.: 191 mg/dL (21 Oct 2019 22:11)  POCT Blood Glucose.: 276 mg/dL (21 Oct 2019 16:04)        A/P:64y Male    1.  DM  Please continue lantus       units at night / morning.  Please continue lispro      units before each meal.  Please continue lispro moderate / low dose sliding scale four times daily with meals and at bedtime    Pt's fingerstick glucose goal is     Will continue to monitor     For discharge, pt can continue    Pt can follow up at discharge with [facility] by calling  to make an appointment.   Will discuss case with     and update primary team HPI: Patient is 65 y/o maleDM, CVT, CHF, HLD, CAD  who presents with right knee pain present for approximately 5 years. Patient reports pain came on gradually and has progressed overtime. He describes pain localized at the right knee with associated stiffness, limited mobility. Patient relates failure of conservative management including oral NSAIDs, intraarticular injections, activity modification all with limited relief of symptoms. He denies associated numbness, tingling through the lower extremities. He denies use of a cane or walker for assistance. Patient endorses recent GI illness 1 week ago that has resolved. Patient underwent colonoscopy Friday to r/o GI bleed for anemia- reports finding were negative. He is s/p right TKR on 10/21/19.     Endocrine was conulsted for Dm management - patient is currently on insulim pump. He took off his insulin pump yesterday morning at 600 AM and was resumed back on the pump yesterday evening at 400PM. He said that he has insulin pump supplies for 3 days.   FSG & Insulin received:  Yesterday:  pre-dinner fs, 6 units lispro SS, had egg salad, pudding  bedtime fs, insulin pump was running, no snacks  Today:  pre-breakfast fs, Had Nepali toast, fruit cup  pre-lunch fs, 3 units lispro SS, Had egg salad, but also had gatorade    Endocrine History  Age at Dx: 30 years ago in his   How dx: routine blood work. Had some polyuria, polydipsia, polyphagia  Hx and duration of insulin:  Current Therapy:  Hx of hypoglycemia  Hx of DKA/HHS?    Home FSG:  Fasting  Lunch  Dinner  Bed    Hx of other regimens  Complications:  Outpatient Endo:    PMH & Surgical Hx:M17.11  Family history of heart disease (Mother)  Family history of heart disease (Mother)  Handoff  MEWS Score  Diabetes  Blood clot due to device, implant, or graft  CKD (chronic kidney disease) stage 2, GFR 60-89 ml/min  COPD, moderate  Hyperkalemia  HLD (hyperlipidemia)  Chronic systolic CHF (congestive heart failure)  Osteoarthritis  HTN (hypertension)  Type 2 diabetes mellitus with neurological manifestations  Type 2 diabetes mellitus  History of surgery to heart and great vessels, presenting hazards to health  Atherosclerosis of coronary artery  Status post percutaneous transluminal coronary angioplasty  Primary osteoarthritis of right knee  Primary osteoarthritis of right knee  Right total knee replacement  Primary osteoarthritis of right knee  Blood clot due to device, implant, or graft  Atherosclerosis of coronary artery  Chronic systolic CHF (congestive heart failure)  HLD (hyperlipidemia)  Diabetes  Primary osteoarthritis of right knee  Right total knee arthroplasty  S/P CABG x 1  Stented coronary artery  History of surgery to major organs, presenting hazards to health  Other postprocedural status      FH:  DM:  Thyroid:  Autoimmune:  Other:    SH:  Smoking  Etoh:  Recreational Drugs:  Social Life:    Current Meds:  acetaminophen   Tablet .. 650 milliGRAM(s) Oral every 6 hours  aluminum hydroxide/magnesium hydroxide/simethicone Suspension 30 milliLiter(s) Oral four times a day PRN  aspirin enteric coated 81 milliGRAM(s) Oral two times a day  atorvastatin 80 milliGRAM(s) Oral at bedtime  BUpivacaine liposome 1.3% Injectable (no eMAR) 20 milliLiter(s) Local Injection once  cefpodoxime 200 milliGRAM(s) Oral every 12 hours  celecoxib 100 milliGRAM(s) Oral two times a day  dextrose 40% Gel 15 Gram(s) Oral once PRN  dextrose 5%. 1000 milliLiter(s) IV Continuous <Continuous>  dextrose 50% Injectable 12.5 Gram(s) IV Push once  dextrose 50% Injectable 25 Gram(s) IV Push once  dextrose 50% Injectable 25 Gram(s) IV Push once  DULoxetine 90 milliGRAM(s) Oral daily  glucagon  Injectable 1 milliGRAM(s) IntraMuscular once PRN  HYDROmorphone  Injectable 0.5 milliGRAM(s) IV Push every 4 hours PRN  HYDROmorphone  Injectable 0.5 milliGRAM(s) IV Push every 30 minutes PRN  metoprolol succinate  milliGRAM(s) Oral daily  morphine  - Injectable 2 milliGRAM(s) IV Push every 4 hours PRN  omega-3-Acid Ethyl Esters 4 Gram(s) Oral daily  ondansetron Injectable 4 milliGRAM(s) IV Push every 6 hours PRN  oxyCODONE    IR 5 milliGRAM(s) Oral every 4 hours PRN  oxyCODONE    IR 10 milliGRAM(s) Oral every 4 hours PRN  oxyCODONE  ER Tablet 10 milliGRAM(s) Oral <User Schedule>  pantoprazole    Tablet 40 milliGRAM(s) Oral before breakfast  polyethylene glycol 3350 17 Gram(s) Oral daily  saccharomyces boulardii 250 milliGRAM(s) Oral two times a day  senna 2 Tablet(s) Oral at bedtime PRN      Allergies:  ACE inhibitors (Hives)  enalapril (Hives)      ROS:  Denies the following except as indicated.    General: weight loss/weight gain, decreased appetite, fatigue  Eyes: Blurry vision, double vision, visual changes  ENT: Throat pain, changes in voice,   CV: palpitations, SOB, CP, cough  GI: NVD, difficulty swallowing, abdominal pain  : polyuria, dysuria  Endo: abnormal menses, temperature intolerance, decreased libido  MSK: weakness, joint pain  Skin: rash, dryness, diaphoresis  Heme: Easy bruising,bleeding  Neuro: HA, dizziness, lightheadedness, numbness tingling  Psych: Anxiety, Depression    Vital Signs Last 24 Hrs  T(C): 36.6 (22 Oct 2019 09:30), Max: 36.9 (21 Oct 2019 21:41)  T(F): 97.8 (22 Oct 2019 09:30), Max: 98.5 (21 Oct 2019 21:41)  HR: 74 (22 Oct 2019 10:02) (56 - 86)  BP: 85/53 (22 Oct 2019 10:02) (72/47 - 158/62)  BP(mean): 89 (21 Oct 2019 15:53) (89 - 89)  RR: 17 (22 Oct 2019 09:30) (15 - 18)  SpO2: 99% (22 Oct 2019 09:30) (96% - 99%)  Height (cm): 185.42 (10-21 @ 06:56)  Weight (kg): 106.6 (10-21 @ 06:56)  BMI (kg/m2): 31 (10-21 @ 06:56)      Constitutional: wn/wd in NAD.   HEENT: NCAT, MMM, OP clear, EOMI, , no proptosis or lid retraction  Neck: no thyromegaly or palpable thyroid nodules   Respiratory: lungs CTAB.  Cardiovascular: regular rhythm, normal S1 and S2, no audible murmurs, no peripheral edema  GI: soft, NT/ND, no masses/HSM appreciated.  Neurology: no tremors, DTR 2+  Skin: no visible rashes/lesions  Psychiatric: AAO x 3, normal affect/mood.  Ext: radial pulses intact, DP pulses intact, extremities warm, no cyanosis, clubbing or edema.       LABS:                        12.9   14.17 )-----------( 194      ( 22 Oct 2019 07:00 )             41.4     10-22    139  |  105  |  19  ----------------------------<  207<H>  4.3   |  23  |  0.97    Ca    8.8      22 Oct 2019 07:00          Hemoglobin A1C, Whole Blood: 7.5 (10-22 @ 07:00)        RADIOLOGY & ADDITIONAL STUDIES:  CAPILLARY BLOOD GLUCOSE      POCT Blood Glucose.: 189 mg/dL (22 Oct 2019 12:04)  POCT Blood Glucose.: 183 mg/dL (22 Oct 2019 06:53)  POCT Blood Glucose.: 191 mg/dL (21 Oct 2019 22:11)  POCT Blood Glucose.: 276 mg/dL (21 Oct 2019 16:04)        A/P:64y Male    1.  DM  Please continue lantus       units at night / morning.  Please continue lispro      units before each meal.  Please continue lispro moderate / low dose sliding scale four times daily with meals and at bedtime    Pt's fingerstick glucose goal is     Will continue to monitor     For discharge, pt can continue    Pt can follow up at discharge with [facility] by calling  to make an appointment.   Will discuss case with     and update primary team HPI: Patient is 63 y/o maleDM, CVT, CHF, HLD, CAD  who presents with right knee pain present for approximately 5 years. Patient reports pain came on gradually and has progressed overtime. He describes pain localized at the right knee with associated stiffness, limited mobility. Patient relates failure of conservative management including oral NSAIDs, intraarticular injections, activity modification all with limited relief of symptoms. He denies associated numbness, tingling through the lower extremities. He denies use of a cane or walker for assistance. Patient endorses recent GI illness 1 week ago that has resolved. Patient underwent colonoscopy Friday to r/o GI bleed for anemia- reports finding were negative. He is s/p right TKR on 10/21/19.     Endocrine was consulted for Dm management - patient is currently on insulim pump. He took off his insulin pump yesterday morning at 600 AM and was resumed back on the pump yesterday evening at 400PM. He said that he has insulin pump supplies for 3 days.    FSG & Insulin received:  Yesterday:  pre-dinner fs, 6 units lispro SS, had egg salad, pudding  bedtime fs, insulin pump was running, no snacks  Today:  pre-breakfast fs, Had Samoan toast, fruit cup  pre-lunch fs, 3 units lispro SS, Had egg salad, but also had gatorade    Endocrine History  Age at Dx: 30 years ago in his   How dx: routine blood work. Had some polyuria, polydipsia, polyphagia  Hx and duration of insulin: He was on metformin and exenatide before. Has been on insulin for the past 6 years. Has been on insulin pump for the past 5 years.  Current insulin Pump Settings  Basal rate @ 4 units/hr    ICR   1200 AM - 4  1200 Noon 3.5  900 PM - 6    ISF 1:25    Target Blood glucose 100    The last 90 days blood glucose level show 39% in 80 - 150, 44% in 151-240, 14% > 240, 1% <70, and 2% 70 to 79    Current Therapy: on insulin pump - med[employer]xpay [id number] but patent is using in non-automatic mode - changed 2 days ago, has freestyle storm CGM monitoring - last changed 10 days ago, Jardiance 10mg once daily, Trulicity 1.5mg/once a week - recently for 4 months   Hx of hypoglycemia- has FSG in60s once or twice a month. happens in the early morning when he takes some juice  Hx of DKA/HHS - denies    Hx of other regimens  Complications: neuropathy, retinopathy  Outpatient Endo:     [facility] Hx:M17.11  Family history of heart disease (Mother)  Diabetes  Blood clot due to device, implant, or graft  CKD (chronic kidney disease) stage 2, GFR 60-89 ml/min  COPD, moderate  Hyperkalemia  HLD (hyperlipidemia)  Chronic systolic CHF (congestive heart failure)  Osteoarthritis  HTN (hypertension)  Type 2 diabetes mellitus with neurological manifestations  History of surgery to heart and great vessels, presenting hazards to health  Atherosclerosis of coronary artery  Status post percutaneous transluminal coronary angioplasty  Primary osteoarthritis of right knee  Right total knee replacement  Blood clot due to device, implant, or graft  Atherosclerosis of coronary artery  Chronic systolic CHF (congestive heart failure)  HLD (hyperlipidemia)  Diabetes  S/P CABG x 1  Stented coronary artery    SH:  Smoking: denies  Etoh: denies  Recreational Drugs: denies  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 650 milliGRAM(s) Oral every 6 hours  aluminum hydroxide/magnesium hydroxide/simethicone Suspension 30 milliLiter(s) Oral four times a day PRN  aspirin enteric coated 81 milliGRAM(s) Oral two times a day  atorvastatin 80 milliGRAM(s) Oral at bedtime  BUpivacaine liposome 1.3% Injectable (no eMAR) 20 milliLiter(s) Local Injection once  cefpodoxime 200 milliGRAM(s) Oral every 12 hours  celecoxib 100 milliGRAM(s) Oral two times a day  dextrose 40% Gel 15 Gram(s) Oral once PRN  dextrose 5%. 1000 milliLiter(s) IV Continuous <Continuous>  dextrose 50% Injectable 12.5 Gram(s) IV Push once  dextrose 50% Injectable 25 Gram(s) IV Push once  dextrose 50% Injectable 25 Gram(s) IV Push once  DULoxetine 90 milliGRAM(s) Oral daily  glucagon  Injectable 1 milliGRAM(s) IntraMuscular once PRN  HYDROmorphone  Injectable 0.5 milliGRAM(s) IV Push every 4 hours PRN  HYDROmorphone  Injectable 0.5 milliGRAM(s) IV Push every 30 minutes PRN  metoprolol succinate  milliGRAM(s) Oral daily  morphine  - Injectable 2 milliGRAM(s) IV Push every 4 hours PRN  omega-3-Acid Ethyl Esters 4 Gram(s) Oral daily  ondansetron Injectable 4 milliGRAM(s) IV Push every 6 hours PRN  oxyCODONE    IR 5 milliGRAM(s) Oral every 4 hours PRN  oxyCODONE    IR 10 milliGRAM(s) Oral every 4 hours PRN  oxyCODONE  ER Tablet 10 milliGRAM(s) Oral <User Schedule>  pantoprazole    Tablet 40 milliGRAM(s) Oral before breakfast  polyethylene glycol 3350 17 Gram(s) Oral daily  saccharomyces boulardii 250 milliGRAM(s) Oral two times a day  senna 2 Tablet(s) Oral at bedtime PRN      Allergies:  ACE inhibitors (Hives)  enalapril (Hives)      ROS:  Denies the following except as indicated.    General: weight loss/weight gain, decreased appetite, fatigue  Eyes: Blurry vision, double vision, visual changes  ENT: Throat pain, changes in voice,   CV: palpitations, SOB, CP, cough  GI: NVD, difficulty swallowing, abdominal pain  : polyuria, dysuria  Endo: temperature intolerance, decreased libido  MSK: weakness, joint pain  Skin: rash, dryness, diaphoresis  Heme: Easy bruising,bleeding  Neuro: HA, dizziness, lightheadedness, numbness tingling  Psych: Anxiety, Depression    Vital Signs Last 24 Hrs  T(C): 36.6 (22 Oct 2019 09:30), Max: 36.9 (21 Oct 2019 21:41)  T(F): 97.8 (22 Oct 2019 09:30), Max: 98.5 (21 Oct 2019 21:41)  HR: 74 (22 Oct 2019 10:02) (56 - 86)  BP: 85/53 (22 Oct 2019 10:02) (72/47 - 158/62)  BP(mean): 89 (21 Oct 2019 15:53) (89 - 89)  RR: 17 (22 Oct 2019 09:30) (15 - 18)  SpO2: 99% (22 Oct 2019 09:30) (96% - 99%)  Height (cm): 185.42 (10-21 @ 06:56)  Weight (kg): 106.6 (10-21 @ 06:56)  BMI (kg/m2): 31 (10-21 @ 06:56)      Constitutional: wn/wd in NAD.   HEENT: NCAT, MMM, OP clear, EOMI, , no proptosis or lid retraction  Neck: no thyromegaly or palpable thyroid nodules   Respiratory: lungs CTAB.  Cardiovascular: regular rhythm, normal S1 and S2, no audible murmurs, no peripheral edema  GI: soft, NT/ND, no masses/HSM appreciated.  Neurology: no tremors, no focal neurological deficits  Skin: no visible rashes/lesions  Psychiatric: AAO x 3, normal affect/mood.  Ext: radial pulses intact, DP pulses +, extremities warm.       LABS:                        12.9   14.17 )-----------( 194      ( 22 Oct 2019 07:00 )             41.4     10-22    139  |  105  |  19  ----------------------------<  207<H>  4.3   |  23  |  0.97    Ca    8.8      22 Oct 2019 07:00          Hemoglobin A1C, Whole Blood: 7.5 (10-22 @ 07:00)        RADIOLOGY & ADDITIONAL STUDIES:  CAPILLARY BLOOD GLUCOSE      POCT Blood Glucose.: 189 mg/dL (22 Oct 2019 12:04)  POCT Blood Glucose.: 183 mg/dL (22 Oct 2019 06:53)  POCT Blood Glucose.: 191 mg/dL (21 Oct 2019 22:11)  POCT Blood Glucose.: 276 mg/dL (21 Oct 2019 16:04)        A/P: Patient is 63 y/o maleDM, CVT, CHF, HLD, CAD  who presents with right knee OA s/p right total knee arthroplasty    1.  DM Type 2 - controlled - with neuropathy and retinopathy  Hba1c 7.5  Cr/GFR 0.97/97  Echo showed EF 30 to 35%  Wt 106.6 kg with BMI 31  Patient can continue to be on insulin pump - manage the basal and bolus settings as mentioned in HPI - Patient signed the insulin pump policy.  PLease check FSG AC and HS  Will continue to monitor     For discharge, pt can be discharged back on insulin pump, Jardiance 10mg once daily and trulicity 1.5mg/once a week    Pt can follow up at discharge with , [facility] by calling  to make an appointment.   Discussed case with   and updated primary team

## 2019-10-22 NOTE — PHYSICAL THERAPY INITIAL EVALUATION ADULT - CRITERIA FOR SKILLED THERAPEUTIC INTERVENTIONS
impairments found/therapy frequency/anticipated equipment needs at discharge/anticipated discharge recommendation/rehab potential/functional limitations in following categories

## 2019-10-22 NOTE — PHYSICAL THERAPY INITIAL EVALUATION ADULT - GAIT DEVIATIONS NOTED, PT EVAL
increased time in double stance/decreased louann/decreased weight-shifting ability/decreased step length/HR went from 80 --> 50 bpm, orthostatic, lightheadedness, pallor, diaphoretic, further ambulation not appropriate, dec R knee extension during terminal stance

## 2019-10-22 NOTE — CONSULT NOTE ADULT - SUBJECTIVE AND OBJECTIVE BOX
Patient seen and examined, Chart reviewed    HPI:  Patient is 63 y/o maleDM, CVT, CHF, HLD, CAD  who presents with right knee pain present for approximately 5 years. Patient reports pain came on gradually and has progressed overtime. He describes pain localized at the right knee with associated stiffness, limited mobility. Patient relates failure of conservative management including oral NSAIDs, intraarticular injections, activity modification all with limited relief of symptoms. He denies associated numbness, tingling through the lower extremities. He denies use of a cane or walker for assistance.   Patient endorses recent GI illness 1 week ago that has resolved. Patient underwent colonoscopy Friday to r/o GI bleed for anemia- reports finding were negative.     He is s/p right TKR on 10/21/19      PAST MEDICAL & SURGICAL HISTORY:  Diabetes  Blood clot due to device, implant, or graft: was on blood thinners  HLD (hyperlipidemia)  Chronic systolic CHF (congestive heart failure)  Osteoarthritis  Atherosclerosis of coronary artery: CAD (coronary artery disease)  Status post percutaneous transluminal coronary angioplasty: in 2012  S/P CABG x 1: 2018  Stented coronary artery: 10/18 heart attack  INFERIOR WALL MI  Other postprocedural status: Fixation hardware in foot LEFT      REVIEW OF SYSTEMS:  CONSTITUTIONAL:  No night sweats.  No fatigue,  No fever or chills.  HEENT:  Eyes:  No visual changes.  No eye pain.  .    ENT:    No sinus pain.  No sore throat.  No odynophagia.  .  RESPIRATORY:  No cough.  No wheeze.    No shortness of breath.  CARDIOVASCULAR:  No chest pains.  No palpitations.  GASTROINTESTINAL:  No abdominal pain.  No nausea or vomiting.  positive diarrhea overnight   GENITOURINARY:  No urgency.  No frequency.  No dysuria.  No hematuria.    MUSCULOSKELETAL:  right knee pain present   SKIN:  No rashes.  No lesions. s.     ALLERGIC AND IMMUNOLOGIC:  No pruritus.     acetaminophen   Tablet .. 650 milliGRAM(s) Oral every 6 hours  aluminum hydroxide/magnesium hydroxide/simethicone Suspension 30 milliLiter(s) Oral four times a day PRN  aspirin enteric coated 81 milliGRAM(s) Oral two times a day  atorvastatin 80 milliGRAM(s) Oral at bedtime  BUpivacaine liposome 1.3% Injectable (no eMAR) 20 milliLiter(s) Local Injection once  celecoxib 100 milliGRAM(s) Oral two times a day  dextrose 40% Gel 15 Gram(s) Oral once PRN  dextrose 5%. 1000 milliLiter(s) IV Continuous <Continuous>  dextrose 50% Injectable 12.5 Gram(s) IV Push once  dextrose 50% Injectable 25 Gram(s) IV Push once  dextrose 50% Injectable 25 Gram(s) IV Push once  docusate sodium 100 milliGRAM(s) Oral three times a day  DULoxetine 90 milliGRAM(s) Oral daily  glucagon  Injectable 1 milliGRAM(s) IntraMuscular once PRN  HYDROmorphone  Injectable 0.5 milliGRAM(s) IV Push every 4 hours PRN  HYDROmorphone  Injectable 0.5 milliGRAM(s) IV Push every 30 minutes PRN  lactated ringers. 1000 milliLiter(s) IV Continuous <Continuous>  magnesium hydroxide Suspension 30 milliLiter(s) Oral daily PRN  metoprolol succinate  milliGRAM(s) Oral daily  morphine  - Injectable 2 milliGRAM(s) IV Push every 4 hours PRN  omega-3-Acid Ethyl Esters 4 Gram(s) Oral daily  ondansetron Injectable 4 milliGRAM(s) IV Push every 6 hours PRN  oxyCODONE    IR 5 milliGRAM(s) Oral every 4 hours PRN  oxyCODONE    IR 10 milliGRAM(s) Oral every 4 hours PRN  oxyCODONE  ER Tablet 10 milliGRAM(s) Oral <User Schedule>  pantoprazole    Tablet 40 milliGRAM(s) Oral before breakfast  polyethylene glycol 3350 17 Gram(s) Oral daily  senna 2 Tablet(s) Oral at bedtime PRN      Allergies    ACE inhibitors (Hives)  enalapril (Hives      SOCIAL HISTORY: former smoker    FAMILY HISTORY:  Family history of heart disease (Mother)  Family history of heart disease (Mother)      Vital Signs Last 24 Hrs  T(C): 36.9 (22 Oct 2019 05:05), Max: 36.9 (21 Oct 2019 21:41)  T(F): 98.5 (22 Oct 2019 05:05), Max: 98.5 (21 Oct 2019 21:41)  HR: 86 (22 Oct 2019 05:05) (78 - 86)  BP: 120/70 (22 Oct 2019 05:05) (95/61 - 158/62)  BP(mean): 89 (21 Oct 2019 15:53) (72 - 89)  RR: 16 (22 Oct 2019 05:05) (12 - 26)  SpO2: 96% (22 Oct 2019 05:05) (94% - 100%)    10-21 @ 07:01  -  10-22 @ 07:00  --------------------------------------------------------  IN: 400 mL / OUT: 850 mL / NET: -450 mL        PHYSICAL EXAM:   General - NAD, Alert and oriented x 3,   Eyes - PERRLA, EOM intact  Neck - - No lymphadenopathy  Cardiovascular - RRR no m/r/g, no JVD  Lungs - Clear to auscultation, no use of accessory muscles, no crackles or wheezes.  Skin - No rashes, skin warm and dry  Abdomen - Normal bowel sounds, abdomen soft and nontender  Extremeties - No edema, cyanosis or clubbing  Neurological – no focal deficits      LABS:                        12.9   14.17 )-----------( 194      ( 22 Oct 2019 07:00 )             41.4                 RADIOLOGY & ADDITIONAL STUDIES:

## 2019-10-22 NOTE — PHYSICAL THERAPY INITIAL EVALUATION ADULT - DIAGNOSIS, PT EVAL
4I: Impaired Joint Mobility, Motor Function, Muscle Performance, and Range of Motion Associated with Bony or Soft Tissue Surgery
4H: Impaired Joint Mobility, Motor Function, Muscle Performance, and Range of Motion Associated with Joint Arthroplasty

## 2019-10-22 NOTE — PHYSICAL THERAPY INITIAL EVALUATION ADULT - MODALITIES TREATMENT COMMENTS
supine therex: ankle pumps, quad sets, glute sets, heel slides x 10    seated therex: knee flex x 10

## 2019-10-22 NOTE — PHYSICAL THERAPY INITIAL EVALUATION ADULT - MANUAL MUSCLE TESTING RESULTS, REHAB EVAL
functional observation against gravity > 3/5 MMT, RLE NT/grossly assessed due to
grossly assessed due to/functional observation against gravity > 3/5 MMT

## 2019-10-22 NOTE — CONSULT NOTE ADULT - ASSESSMENT
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 Nuno on 6/1/18.  He is today s/p right TKR.    1) CAD - stable    - continue with asa, statin, b blocker  - restart effient as per ortho  2) DM - on pump  - hold oral meds from home  - check Fs's  - diabetic diet  3) HTN- low but acceptable  4) Anemia- Hb 12.9- stable  5) DVT ppx with asa bid dosing

## 2019-10-22 NOTE — PROGRESS NOTE ADULT - SUBJECTIVE AND OBJECTIVE BOX
Ortho Note    Pt comfortable without complaints, pain controlled  Denies CP, SOB, N/V, numbness/tingling   Patient without signs/symptoms concerning for acute limb ischemia overnight    Vital Signs Last 24 Hrs  T(C): 36.9 (10-22-19 @ 05:05), Max: 36.9 (10-22-19 @ 05:05)  T(F): 98.5 (10-22-19 @ 05:05), Max: 98.5 (10-22-19 @ 05:05)  HR: 86 (10-22-19 @ 05:05) (86 - 86)  BP: 120/70 (10-22-19 @ 05:05) (120/70 - 120/70)  BP(mean): --  RR: 16 (10-22-19 @ 05:05) (16 - 16)  SpO2: 96% (10-22-19 @ 05:05) (96% - 96%)      AVSS  General: Pt Alert and oriented, NAD  RLE: Aquacell DSG C/D/I  Pulses: WWP; Palpable DP pulse; Cap refill < 2 sec  Sensation: Sensation mildly decreased over b/l plantar surfaces consistent with baseline as per patient; Otherwise SILT and symmetric  Motor: EHL/FHL/TA/GS 5/5 and symmetric to contralateral extremity                          12.9   14.17 )-----------( 194      ( 22 Oct 2019 07:00 )             41.4     22 Oct 2019 07:00    139    |  105    |  19     ----------------------------<  207    4.3     |  23     |  0.97     Ca    8.8        22 Oct 2019 07:00

## 2019-10-22 NOTE — PHYSICAL THERAPY INITIAL EVALUATION ADULT - ACTIVE RANGE OF MOTION EXAMINATION, REHAB EVAL
R knee 0-90 flexion AROM/no Active ROM deficits were identified
R knee/no Active ROM deficits were identified

## 2019-10-22 NOTE — PHYSICAL THERAPY INITIAL EVALUATION ADULT - PERTINENT HX OF CURRENT PROBLEM, REHAB EVAL
65 y/o male who presents with c/o right knee pain present x approximately 5 years. Patient reports pain came on gradually and has progressed overtime. He describes pain localized at the right knee with associated stiffness, limited mobility. Patient relates failure of conservative management including oral NSAIDs, intraarticular injections, activity modification all with limited relief of symptoms.
63 y/o male who presents with c/o right knee pain present x approximately 5 years. Patient reports pain came on gradually and has progressed overtime. He describes pain localized at the right knee with associated stiffness, limited mobility. Patient relates failure of conservative management including oral NSAIDs, intraarticular injections, activity modification all with limited relief of symptoms.

## 2019-10-23 ENCOUNTER — TRANSCRIPTION ENCOUNTER (OUTPATIENT)
Age: 64
End: 2019-10-23

## 2019-10-23 LAB
ANION GAP SERPL CALC-SCNC: 10 MMOL/L — SIGNIFICANT CHANGE UP (ref 5–17)
BUN SERPL-MCNC: 17 MG/DL — SIGNIFICANT CHANGE UP (ref 7–23)
CALCIUM SERPL-MCNC: 9.2 MG/DL — SIGNIFICANT CHANGE UP (ref 8.4–10.5)
CHLORIDE SERPL-SCNC: 103 MMOL/L — SIGNIFICANT CHANGE UP (ref 96–108)
CO2 SERPL-SCNC: 25 MMOL/L — SIGNIFICANT CHANGE UP (ref 22–31)
CREAT SERPL-MCNC: 0.94 MG/DL — SIGNIFICANT CHANGE UP (ref 0.5–1.3)
GLUCOSE BLDC GLUCOMTR-MCNC: 114 MG/DL — HIGH (ref 70–99)
GLUCOSE BLDC GLUCOMTR-MCNC: 205 MG/DL — HIGH (ref 70–99)
GLUCOSE BLDC GLUCOMTR-MCNC: 57 MG/DL — LOW (ref 70–99)
GLUCOSE SERPL-MCNC: 259 MG/DL — HIGH (ref 70–99)
HCT VFR BLD CALC: 37.9 % — LOW (ref 39–50)
HGB BLD-MCNC: 11.7 G/DL — LOW (ref 13–17)
MCHC RBC-ENTMCNC: 25.7 PG — LOW (ref 27–34)
MCHC RBC-ENTMCNC: 30.9 GM/DL — LOW (ref 32–36)
MCV RBC AUTO: 83.1 FL — SIGNIFICANT CHANGE UP (ref 80–100)
NRBC # BLD: 0 /100 WBCS — SIGNIFICANT CHANGE UP (ref 0–0)
PLATELET # BLD AUTO: 166 K/UL — SIGNIFICANT CHANGE UP (ref 150–400)
POTASSIUM SERPL-MCNC: 4.9 MMOL/L — SIGNIFICANT CHANGE UP (ref 3.5–5.3)
POTASSIUM SERPL-SCNC: 4.9 MMOL/L — SIGNIFICANT CHANGE UP (ref 3.5–5.3)
RBC # BLD: 4.56 M/UL — SIGNIFICANT CHANGE UP (ref 4.2–5.8)
RBC # FLD: 23.2 % — HIGH (ref 10.3–14.5)
SODIUM SERPL-SCNC: 138 MMOL/L — SIGNIFICANT CHANGE UP (ref 135–145)
WBC # BLD: 13.49 K/UL — HIGH (ref 3.8–10.5)
WBC # FLD AUTO: 13.49 K/UL — HIGH (ref 3.8–10.5)

## 2019-10-23 PROCEDURE — 99233 SBSQ HOSP IP/OBS HIGH 50: CPT

## 2019-10-23 RX ORDER — SODIUM CHLORIDE 9 MG/ML
1000 INJECTION INTRAMUSCULAR; INTRAVENOUS; SUBCUTANEOUS
Refills: 0 | Status: COMPLETED | OUTPATIENT
Start: 2019-10-23 | End: 2019-10-23

## 2019-10-23 RX ORDER — SODIUM CHLORIDE 9 MG/ML
1000 INJECTION INTRAMUSCULAR; INTRAVENOUS; SUBCUTANEOUS
Refills: 0 | Status: DISCONTINUED | OUTPATIENT
Start: 2019-10-23 | End: 2019-10-23

## 2019-10-23 RX ORDER — INSULIN ASPART 100 [IU]/ML
1 INJECTION, SOLUTION SUBCUTANEOUS
Refills: 0 | Status: DISCONTINUED | OUTPATIENT
Start: 2019-10-23 | End: 2019-10-24

## 2019-10-23 RX ORDER — METOPROLOL TARTRATE 50 MG
100 TABLET ORAL
Refills: 0 | Status: DISCONTINUED | OUTPATIENT
Start: 2019-10-24 | End: 2019-10-25

## 2019-10-23 RX ADMIN — Medication 650 MILLIGRAM(S): at 18:45

## 2019-10-23 RX ADMIN — ATORVASTATIN CALCIUM 80 MILLIGRAM(S): 80 TABLET, FILM COATED ORAL at 21:36

## 2019-10-23 RX ADMIN — Medication 650 MILLIGRAM(S): at 00:22

## 2019-10-23 RX ADMIN — Medication 200 MILLIGRAM(S): at 18:45

## 2019-10-23 RX ADMIN — DULOXETINE HYDROCHLORIDE 90 MILLIGRAM(S): 30 CAPSULE, DELAYED RELEASE ORAL at 13:03

## 2019-10-23 RX ADMIN — Medication 200 MILLIGRAM(S): at 05:23

## 2019-10-23 RX ADMIN — OXYCODONE HYDROCHLORIDE 10 MILLIGRAM(S): 5 TABLET ORAL at 06:00

## 2019-10-23 RX ADMIN — POLYETHYLENE GLYCOL 3350 17 GRAM(S): 17 POWDER, FOR SOLUTION ORAL at 13:03

## 2019-10-23 RX ADMIN — Medication 81 MILLIGRAM(S): at 05:23

## 2019-10-23 RX ADMIN — OXYCODONE HYDROCHLORIDE 10 MILLIGRAM(S): 5 TABLET ORAL at 17:04

## 2019-10-23 RX ADMIN — CELECOXIB 100 MILLIGRAM(S): 200 CAPSULE ORAL at 18:44

## 2019-10-23 RX ADMIN — SODIUM CHLORIDE 60 MILLILITER(S): 9 INJECTION INTRAMUSCULAR; INTRAVENOUS; SUBCUTANEOUS at 13:02

## 2019-10-23 RX ADMIN — Medication 250 MILLIGRAM(S): at 05:24

## 2019-10-23 RX ADMIN — OXYCODONE HYDROCHLORIDE 10 MILLIGRAM(S): 5 TABLET ORAL at 17:35

## 2019-10-23 RX ADMIN — Medication 4 GRAM(S): at 13:03

## 2019-10-23 RX ADMIN — Medication 650 MILLIGRAM(S): at 13:43

## 2019-10-23 RX ADMIN — Medication 100 MILLIGRAM(S): at 05:23

## 2019-10-23 RX ADMIN — CELECOXIB 100 MILLIGRAM(S): 200 CAPSULE ORAL at 05:23

## 2019-10-23 RX ADMIN — Medication 81 MILLIGRAM(S): at 18:44

## 2019-10-23 RX ADMIN — PANTOPRAZOLE SODIUM 40 MILLIGRAM(S): 20 TABLET, DELAYED RELEASE ORAL at 06:32

## 2019-10-23 RX ADMIN — OXYCODONE HYDROCHLORIDE 10 MILLIGRAM(S): 5 TABLET ORAL at 00:22

## 2019-10-23 RX ADMIN — Medication 650 MILLIGRAM(S): at 05:24

## 2019-10-23 RX ADMIN — Medication 650 MILLIGRAM(S): at 14:15

## 2019-10-23 RX ADMIN — Medication 250 MILLIGRAM(S): at 18:45

## 2019-10-23 RX ADMIN — Medication 650 MILLIGRAM(S): at 06:00

## 2019-10-23 RX ADMIN — CELECOXIB 100 MILLIGRAM(S): 200 CAPSULE ORAL at 06:00

## 2019-10-23 RX ADMIN — OXYCODONE HYDROCHLORIDE 10 MILLIGRAM(S): 5 TABLET ORAL at 05:24

## 2019-10-23 NOTE — PROGRESS NOTE ADULT - SUBJECTIVE AND OBJECTIVE BOX
INTERVAL HPI/OVERNIGHT EVENTS:  Episode of dizziness with first PT session yesterday, second session went well  No issues overnight  Diarrhea has resolved    MEDICATIONS  (STANDING):  acetaminophen   Tablet .. 650 milliGRAM(s) Oral every 6 hours  aspirin enteric coated 81 milliGRAM(s) Oral two times a day  atorvastatin 80 milliGRAM(s) Oral at bedtime  BUpivacaine liposome 1.3% Injectable (no eMAR) 20 milliLiter(s) Local Injection once  cefpodoxime 200 milliGRAM(s) Oral every 12 hours  celecoxib 100 milliGRAM(s) Oral two times a day  dextrose 10% Bolus 125 milliLiter(s) IV Bolus once  dextrose 10% Bolus 250 milliLiter(s) IV Bolus once  dextrose 5%. 1000 milliLiter(s) (50 mL/Hr) IV Continuous <Continuous>  dextrose 50% Injectable 12.5 Gram(s) IV Push once  dextrose 50% Injectable 25 Gram(s) IV Push once  dextrose 50% Injectable 25 Gram(s) IV Push once  DULoxetine 90 milliGRAM(s) Oral daily  insulin aspart (NovoLOG) Pump 1 Each SubCutaneous Continuous Pump  metoprolol succinate  milliGRAM(s) Oral daily  omega-3-Acid Ethyl Esters 4 Gram(s) Oral daily  oxyCODONE  ER Tablet 10 milliGRAM(s) Oral <User Schedule>  pantoprazole    Tablet 40 milliGRAM(s) Oral before breakfast  polyethylene glycol 3350 17 Gram(s) Oral daily  saccharomyces boulardii 250 milliGRAM(s) Oral two times a day    MEDICATIONS  (PRN):  aluminum hydroxide/magnesium hydroxide/simethicone Suspension 30 milliLiter(s) Oral four times a day PRN Indigestion  bisacodyl Suppository 10 milliGRAM(s) Rectal daily PRN If no bowel movement by postoperative day #2  dextrose 40% Gel 15 Gram(s) Oral once PRN Blood Glucose LESS THAN 70 milliGRAM(s)/deciliter  glucagon  Injectable 1 milliGRAM(s) IntraMuscular once PRN Glucose LESS THAN 70 milligrams/deciliter  HYDROmorphone  Injectable 0.5 milliGRAM(s) IV Push every 4 hours PRN Breakthrough Pain  HYDROmorphone  Injectable 0.5 milliGRAM(s) IV Push every 30 minutes PRN Breakthrough Pain  morphine  - Injectable 2 milliGRAM(s) IV Push every 4 hours PRN Severe Pain (7 - 10)  ondansetron Injectable 4 milliGRAM(s) IV Push every 6 hours PRN Nausea and/or Vomiting  oxyCODONE    IR 5 milliGRAM(s) Oral every 4 hours PRN Mild Pain (1 - 3)  oxyCODONE    IR 10 milliGRAM(s) Oral every 4 hours PRN Moderate Pain (4 - 6)  senna 2 Tablet(s) Oral at bedtime PRN Constipation      Allergies    ACE inhibitors (Hives)  enalapril (Hives)    Intolerances        REVIEW OF SYSTEMS:  CONSTITUTIONAL:  No night sweats.  No fatigue,  No fever or chills.  HEENT:  Eyes:  No visual changes.  No eye pain.  .    ENT:    No sinus pain.  No sore throat.  No odynophagia.  .  RESPIRATORY:  No cough.  No wheeze.    No shortness of breath.  CARDIOVASCULAR:  No chest pains.  No palpitations.  GASTROINTESTINAL:  No abdominal pain.  No nausea or vomiting.  no diarrhea  GENITOURINARY:  No urgency.  No frequency.  No dysuria.  No hematuria.    MUSCULOSKELETAL:  right knee pain present   SKIN:  No rashes.  No lesions. s.     ALLERGIC AND IMMUNOLOGIC:  No pruritus.     T(C): 36.5 (10-23-19 @ 05:21), Max: 37.2 (10-22-19 @ 13:54)  HR: 82 (10-23-19 @ 05:21) (56 - 96)  BP: 102/71 (10-23-19 @ 05:21) (72/47 - 137/64)  RR: 18 (10-23-19 @ 05:21) (16 - 18)  SpO2: 96% (10-23-19 @ 05:21) (95% - 99%)  Wt(kg): --    PHYSICAL EXAM:   General - NAD, Alert and oriented x 3,   Eyes - PERRLA, EOM intact  Neck - - No lymphadenopathy  Cardiovascular - RRR no m/r/g, no JVD  Lungs - Clear to auscultation, no use of accessory muscles, no crackles or wheezes.  Skin - No rashes, skin warm and dry  Abdomen - Normal bowel sounds, abdomen soft and nontender  Extremeties - No edema, cyanosis or clubbing  Neurological – no focal deficits    LABS:                        12.9   14.17 )-----------( 194      ( 22 Oct 2019 07:00 )             41.4     10-22    139  |  105  |  19  ----------------------------<  207<H>  4.3   |  23  |  0.97    Ca    8.8      22 Oct 2019 07:00            RADIOLOGY & ADDITIONAL TESTS:

## 2019-10-23 NOTE — PROGRESS NOTE ADULT - SUBJECTIVE AND OBJECTIVE BOX
Ortho Note    Pt seen and examined. Comfortable without complaints, pain controlled  Denies CP, SOB, N/V, numbness/tingling     Vital Signs Last 24 Hrs  T(C): 36.6 (10-23-19 @ 09:06), Max: 36.6 (10-23-19 @ 09:06)  T(F): 97.9 (10-23-19 @ 09:06), Max: 97.9 (10-23-19 @ 09:06)  HR: 71 (10-23-19 @ 09:06) (71 - 82)  BP: 101/66 (10-23-19 @ 09:06) (101/66 - 102/71)  BP(mean): --  RR: 18 (10-23-19 @ 09:06) (18 - 18)  SpO2: 96% (10-23-19 @ 09:06) (96% - 96%)  AVSS    General: Pt Alert and oriented, NAD  DSG- aquacel to R knee CDI, bone foam for extension  Pulses: +DP; feet cool to touch bilaterally  Sensation: SILT colton  Motor: 5/5 EHL/FHL/TA/GS                          12.9   14.17 )-----------( 194      ( 22 Oct 2019 07:00 )             41.4   22 Oct 2019 07:00    139    |  105    |  19     ----------------------------<  207    4.3     |  23     |  0.97           A/P: 64yMale POD#2 s/p right total knee replacement  - Stable  - Pain Control- continue current regimen, pain well-controlled  - DVT ppx: ASA 81mg bid in-house, d/c'd on home dose of effient  - PT, WBS: WBAT  - continue SCDs for circulation- Pt reports both feet as being cold at times, at baseline. H/o neuropathy. Follows up outpatient with vascular (Mellissa), and podiatry regularly for vascular wound/ diabetic foot ulcer prevention.   - continue bowel regimen  - OOB for meals, I/S  - dispo: home with [facility]    Ortho Pager [phone]

## 2019-10-23 NOTE — PROGRESS NOTE ADULT - SUBJECTIVE AND OBJECTIVE BOX
Ortho Note    Pt comfortable without complaints, pain controlled  Denies CP, SOB, N/V, numbness/tingling   Patient without signs/symptoms concerning for acute limb ischemia overnight    Vital Signs Last 24 Hrs  T(C): 37.2 (23 Oct 2019 16:44), Max: 37.2 (23 Oct 2019 16:44)  T(F): 98.9 (23 Oct 2019 16:44), Max: 98.9 (23 Oct 2019 16:44)  HR: 85 (23 Oct 2019 16:44) (71 - 96)  BP: 125/77 (23 Oct 2019 16:44) (89/47 - 144/74)  BP(mean): --  RR: 18 (23 Oct 2019 16:44) (17 - 18)  SpO2: 95% (23 Oct 2019 16:44) (94% - 97%)      AVSS  General: Pt Alert and oriented, NAD  RLE: Aquacell DSG C/D/I  Pulses: WWP; Palpable DP pulse; Cap refill < 2 sec  Sensation: Sensation mildly decreased over b/l plantar surfaces consistent with baseline as per patient; Otherwise SILT and symmetric  Motor: EHL/FHL/TA/GS 5/5 and symmetric to contralateral extremity                 Labs:                        11.7   13.49 )-----------( 166      ( 23 Oct 2019 15:13 )             37.9       10-23    138  |  103  |  17  ----------------------------<  259<H>  4.9   |  25  |  0.94    Ca    9.2      23 Oct 2019 15:13

## 2019-10-23 NOTE — DISCHARGE NOTE NURSING/CASE MANAGEMENT/SOCIAL WORK - PATIENT PORTAL LINK FT
You can access the FollowMyHealth Patient Portal offered by [facility] by registering at the following website: [email or web address]. By joining "[employer]’s FollowMyHealth portal, you will also be able to view your health information using other applications (apps) compatible with our system.

## 2019-10-23 NOTE — PROGRESS NOTE ADULT - SUBJECTIVE AND OBJECTIVE BOX
INTERVAL HPI/OVERNIGHT EVENTS:    Patient is a 64y old  Male who presents with a chief complaint of right knee pain (23 Oct 2019 08:03)      Pt reports the following symptoms:    CONSTITUTIONAL:  Negative fever or chills, feels well, good appetite  EYES:  Negative  blurry vision or double vision  CARDIOVASCULAR:  Negative for chest pain or palpitations  RESPIRATORY:  Negative for cough, wheezing, or SOB   GASTROINTESTINAL:  Negative for nausea, vomiting, diarrhea, constipation, or abdominal pain  GENITOURINARY:  Negative frequency, urgency or dysuria  NEUROLOGIC:  No headache, confusion, dizziness, lightheadedness    MEDICATIONS  (STANDING):  acetaminophen   Tablet .. 650 milliGRAM(s) Oral every 6 hours  aspirin enteric coated 81 milliGRAM(s) Oral two times a day  atorvastatin 80 milliGRAM(s) Oral at bedtime  BUpivacaine liposome 1.3% Injectable (no eMAR) 20 milliLiter(s) Local Injection once  cefpodoxime 200 milliGRAM(s) Oral every 12 hours  celecoxib 100 milliGRAM(s) Oral two times a day  dextrose 10% Bolus 125 milliLiter(s) IV Bolus once  dextrose 10% Bolus 250 milliLiter(s) IV Bolus once  dextrose 5%. 1000 milliLiter(s) (50 mL/Hr) IV Continuous <Continuous>  dextrose 50% Injectable 12.5 Gram(s) IV Push once  dextrose 50% Injectable 25 Gram(s) IV Push once  dextrose 50% Injectable 25 Gram(s) IV Push once  DULoxetine 90 milliGRAM(s) Oral daily  insulin aspart (NovoLOG) Pump 1 Each SubCutaneous Continuous Pump  metoprolol succinate  milliGRAM(s) Oral daily  omega-3-Acid Ethyl Esters 4 Gram(s) Oral daily  oxyCODONE  ER Tablet 10 milliGRAM(s) Oral <User Schedule>  pantoprazole    Tablet 40 milliGRAM(s) Oral before breakfast  polyethylene glycol 3350 17 Gram(s) Oral daily  saccharomyces boulardii 250 milliGRAM(s) Oral two times a day    MEDICATIONS  (PRN):  aluminum hydroxide/magnesium hydroxide/simethicone Suspension 30 milliLiter(s) Oral four times a day PRN Indigestion  bisacodyl Suppository 10 milliGRAM(s) Rectal daily PRN If no bowel movement by postoperative day #2  dextrose 40% Gel 15 Gram(s) Oral once PRN Blood Glucose LESS THAN 70 milliGRAM(s)/deciliter  glucagon  Injectable 1 milliGRAM(s) IntraMuscular once PRN Glucose LESS THAN 70 milligrams/deciliter  HYDROmorphone  Injectable 0.5 milliGRAM(s) IV Push every 4 hours PRN Breakthrough Pain  HYDROmorphone  Injectable 0.5 milliGRAM(s) IV Push every 30 minutes PRN Breakthrough Pain  morphine  - Injectable 2 milliGRAM(s) IV Push every 4 hours PRN Severe Pain (7 - 10)  ondansetron Injectable 4 milliGRAM(s) IV Push every 6 hours PRN Nausea and/or Vomiting  oxyCODONE    IR 5 milliGRAM(s) Oral every 4 hours PRN Mild Pain (1 - 3)  oxyCODONE    IR 10 milliGRAM(s) Oral every 4 hours PRN Moderate Pain (4 - 6)  senna 2 Tablet(s) Oral at bedtime PRN Constipation      PHYSICAL EXAM  Vital Signs Last 24 Hrs  T(C): 36.5 (23 Oct 2019 05:21), Max: 37.2 (22 Oct 2019 13:54)  T(F): 97.7 (23 Oct 2019 05:21), Max: 98.9 (22 Oct 2019 13:54)  HR: 82 (23 Oct 2019 05:21) (56 - 96)  BP: 102/71 (23 Oct 2019 05:21) (72/47 - 137/64)  BP(mean): --  RR: 18 (23 Oct 2019 05:21) (16 - 18)  SpO2: 96% (23 Oct 2019 05:21) (95% - 99%)    Constitutional: wn/wd in NAD.   HEENT: NCAT, MMM, OP clear, EOMI, no proptosis or lid retraction  Neck: no thyromegaly or palpable thyroid nodules   Respiratory: lungs CTAB.  Cardiovascular: regular rhythm, normal S1 and S2, no audible murmurs, no peripheral edema  GI: soft, NT/ND, no masses/HSM appreciated.  Neurology: no tremors, DTR 2+  Skin: no visible rashes/lesions  Psychiatric: AAO x 3, normal affect/mood.    LABS:                        12.9   14.17 )-----------( 194      ( 22 Oct 2019 07:00 )             41.4     10-22    139  |  105  |  19  ----------------------------<  207<H>  4.3   |  23  |  0.97    Ca    8.8      22 Oct 2019 07:00              HbA1C: 7.5 % (10-22 @ 07:00)    CAPILLARY BLOOD GLUCOSE      POCT Blood Glucose.: 57 mg/dL (23 Oct 2019 07:07)  POCT Blood Glucose.: 209 mg/dL (22 Oct 2019 22:06)  POCT Blood Glucose.: 221 mg/dL (22 Oct 2019 17:08)  POCT Blood Glucose.: 189 mg/dL (22 Oct 2019 12:04)      Insulin Sliding Scale requirements X 24 Hours:    RADIOLOGY & ADDITIONAL TESTS:    A/P: 64y Male with history of DM type II presenting for       1.  DM -     Please continue           units lantus at bedtime  / in the morning and        units lispro with meals and lispro moderate / low dose sliding scale 4 times daily with meals and at bedtime.  Please continue consistent carbohydrate diet.      Goal FSG is   Will continue to monitor   For discharge, pt can continue    Pt can follow up at discharge with [facility] by calling  to make an appointment.   Will discuss case with     and update primary team INTERVAL HPI/OVERNIGHT EVENTS:    Patient seen and examined at the bedside. he is currently on insulin pump. He had some episodes of dizziness when he was getting physical therapy. he was given some fluid bolus yesterday. Today morning, his FSG was low to 57 - for which he was given some juice. He suspended his basal insulin pump from 800 AM till noon today. and was resumed back on basal rate at 4 units/hr. His appetite is still poor.    FSG & Insulin received:  Yesterday:  pre-dinner fs, 3 units bolus via the insulin pump, had shrimp, rice, soup, pudding  bedtime fs,   Today:  pre-breakfast fs, gave 2 cups of juice - 110, had Austrian toast, campa, small fruit plate  1100AM fs, 3 units bolus       Pt reports the following symptoms:    CONSTITUTIONAL:  Negative fever or chills, feels well, good appetite  EYES:  Negative  blurry vision or double vision  CARDIOVASCULAR:  Negative for chest pain or palpitations  RESPIRATORY:  Negative for cough, wheezing, or SOB   GASTROINTESTINAL:  Negative for nausea, vomiting, diarrhea, constipation, or abdominal pain  GENITOURINARY:  Negative frequency, urgency or dysuria  NEUROLOGIC:  No headache, confusion    MEDICATIONS  (STANDING):  acetaminophen   Tablet .. 650 milliGRAM(s) Oral every 6 hours  aspirin enteric coated 81 milliGRAM(s) Oral two times a day  atorvastatin 80 milliGRAM(s) Oral at bedtime  BUpivacaine liposome 1.3% Injectable (no eMAR) 20 milliLiter(s) Local Injection once  cefpodoxime 200 milliGRAM(s) Oral every 12 hours  celecoxib 100 milliGRAM(s) Oral two times a day  dextrose 10% Bolus 125 milliLiter(s) IV Bolus once  dextrose 10% Bolus 250 milliLiter(s) IV Bolus once  dextrose 5%. 1000 milliLiter(s) (50 mL/Hr) IV Continuous <Continuous>  dextrose 50% Injectable 12.5 Gram(s) IV Push once  dextrose 50% Injectable 25 Gram(s) IV Push once  dextrose 50% Injectable 25 Gram(s) IV Push once  DULoxetine 90 milliGRAM(s) Oral daily  insulin aspart (NovoLOG) Pump 1 Each SubCutaneous Continuous Pump  metoprolol succinate  milliGRAM(s) Oral daily  omega-3-Acid Ethyl Esters 4 Gram(s) Oral daily  oxyCODONE  ER Tablet 10 milliGRAM(s) Oral <User Schedule>  pantoprazole    Tablet 40 milliGRAM(s) Oral before breakfast  polyethylene glycol 3350 17 Gram(s) Oral daily  saccharomyces boulardii 250 milliGRAM(s) Oral two times a day    MEDICATIONS  (PRN):  aluminum hydroxide/magnesium hydroxide/simethicone Suspension 30 milliLiter(s) Oral four times a day PRN Indigestion  bisacodyl Suppository 10 milliGRAM(s) Rectal daily PRN If no bowel movement by postoperative day #2  dextrose 40% Gel 15 Gram(s) Oral once PRN Blood Glucose LESS THAN 70 milliGRAM(s)/deciliter  glucagon  Injectable 1 milliGRAM(s) IntraMuscular once PRN Glucose LESS THAN 70 milligrams/deciliter  HYDROmorphone  Injectable 0.5 milliGRAM(s) IV Push every 4 hours PRN Breakthrough Pain  HYDROmorphone  Injectable 0.5 milliGRAM(s) IV Push every 30 minutes PRN Breakthrough Pain  morphine  - Injectable 2 milliGRAM(s) IV Push every 4 hours PRN Severe Pain (7 - 10)  ondansetron Injectable 4 milliGRAM(s) IV Push every 6 hours PRN Nausea and/or Vomiting  oxyCODONE    IR 5 milliGRAM(s) Oral every 4 hours PRN Mild Pain (1 - 3)  oxyCODONE    IR 10 milliGRAM(s) Oral every 4 hours PRN Moderate Pain (4 - 6)  senna 2 Tablet(s) Oral at bedtime PRN Constipation      PHYSICAL EXAM  Vital Signs Last 24 Hrs  T(C): 36.5 (23 Oct 2019 05:21), Max: 37.2 (22 Oct 2019 13:54)  T(F): 97.7 (23 Oct 2019 05:21), Max: 98.9 (22 Oct 2019 13:54)  HR: 82 (23 Oct 2019 05:21) (56 - 96)  BP: 102/71 (23 Oct 2019 05:21) (72/47 - 137/64)  BP(mean): --  RR: 18 (23 Oct 2019 05:21) (16 - 18)  SpO2: 96% (23 Oct 2019 05:21) (95% - 99%)    Constitutional: wn/wd in NAD.   Respiratory: lungs CTAB.  Cardiovascular: regular rhythm, normal S1 and S2, no peripheral edema  GI: soft, NT/ND, no masses/HSM appreciated.  Neurology: no tremors, no focal neurological deficit      LABS:                        12.9   14.17 )-----------( 194      ( 22 Oct 2019 07:00 )             41.4     10-22    139  |  105  |  19  ----------------------------<  207<H>  4.3   |  23  |  0.97    Ca    8.8      22 Oct 2019 07:00              HbA1C: 7.5 % (10-22 @ 07:00)    CAPILLARY BLOOD GLUCOSE      POCT Blood Glucose.: 57 mg/dL (23 Oct 2019 07:07)  POCT Blood Glucose.: 209 mg/dL (22 Oct 2019 22:06)  POCT Blood Glucose.: 221 mg/dL (22 Oct 2019 17:08)  POCT Blood Glucose.: 189 mg/dL (22 Oct 2019 12:04)      Insulin Sliding Scale requirements X 24 Hours:    RADIOLOGY & ADDITIONAL TESTS:    A/P: Patient is 63 y/o maleDM, CVT, CHF, HLD, CAD  who presents with right knee OA s/p right total knee arthroplasty    1.  DM Type 2 - controlled - with neuropathy and retinopathy  Hba1c 7.5  Cr/GFR 0.97/97  Echo showed EF 30 to 35%  Wt 106.6 kg with BMI 31  Patient can continue to be on insulin pump - Patient signed the insulin pump policy.  We decreased the basal rate to 3.6 units/hr from Midnight to 700 AM and resume back to 4 units/hr from 700 AM till midnight given his morning FSG being low. These changes were made in the insulin pump.   PLease check FSG AC and HS  Will continue to monitor     For discharge, pt can be discharged back on insulin pump, Jardiance 10mg once daily and trulicity 1.5mg/once a week    Pt can follow up at discharge with , [facility] by calling  to make an appointment.   Discussed case with

## 2019-10-24 LAB
GLUCOSE BLDC GLUCOMTR-MCNC: 244 MG/DL — HIGH (ref 70–99)
GLUCOSE BLDC GLUCOMTR-MCNC: 291 MG/DL — HIGH (ref 70–99)
GLUCOSE BLDC GLUCOMTR-MCNC: 70 MG/DL — SIGNIFICANT CHANGE UP (ref 70–99)

## 2019-10-24 PROCEDURE — 99232 SBSQ HOSP IP/OBS MODERATE 35: CPT

## 2019-10-24 PROCEDURE — 93306 TTE W/DOPPLER COMPLETE: CPT | Mod: 26

## 2019-10-24 RX ORDER — ASPIRIN/CALCIUM CARB/MAGNESIUM 324 MG
81 TABLET ORAL DAILY
Refills: 0 | Status: DISCONTINUED | OUTPATIENT
Start: 2019-10-25 | End: 2019-10-25

## 2019-10-24 RX ORDER — PRASUGREL 5 MG/1
10 TABLET, FILM COATED ORAL DAILY
Refills: 0 | Status: DISCONTINUED | OUTPATIENT
Start: 2019-10-24 | End: 2019-10-25

## 2019-10-24 RX ORDER — INSULIN ASPART 100 [IU]/ML
1 INJECTION, SOLUTION SUBCUTANEOUS
Refills: 0 | Status: DISCONTINUED | OUTPATIENT
Start: 2019-10-24 | End: 2019-10-25

## 2019-10-24 RX ADMIN — DULOXETINE HYDROCHLORIDE 90 MILLIGRAM(S): 30 CAPSULE, DELAYED RELEASE ORAL at 11:49

## 2019-10-24 RX ADMIN — CELECOXIB 100 MILLIGRAM(S): 200 CAPSULE ORAL at 19:31

## 2019-10-24 RX ADMIN — Medication 650 MILLIGRAM(S): at 11:48

## 2019-10-24 RX ADMIN — CELECOXIB 100 MILLIGRAM(S): 200 CAPSULE ORAL at 18:20

## 2019-10-24 RX ADMIN — OXYCODONE HYDROCHLORIDE 10 MILLIGRAM(S): 5 TABLET ORAL at 22:30

## 2019-10-24 RX ADMIN — Medication 4 GRAM(S): at 11:48

## 2019-10-24 RX ADMIN — Medication 250 MILLIGRAM(S): at 06:49

## 2019-10-24 RX ADMIN — OXYCODONE HYDROCHLORIDE 10 MILLIGRAM(S): 5 TABLET ORAL at 21:32

## 2019-10-24 RX ADMIN — Medication 650 MILLIGRAM(S): at 12:47

## 2019-10-24 RX ADMIN — Medication 250 MILLIGRAM(S): at 18:19

## 2019-10-24 RX ADMIN — Medication 81 MILLIGRAM(S): at 06:48

## 2019-10-24 RX ADMIN — Medication 200 MILLIGRAM(S): at 18:25

## 2019-10-24 RX ADMIN — PANTOPRAZOLE SODIUM 40 MILLIGRAM(S): 20 TABLET, DELAYED RELEASE ORAL at 06:47

## 2019-10-24 RX ADMIN — Medication 650 MILLIGRAM(S): at 00:56

## 2019-10-24 RX ADMIN — Medication 650 MILLIGRAM(S): at 00:11

## 2019-10-24 RX ADMIN — OXYCODONE HYDROCHLORIDE 10 MILLIGRAM(S): 5 TABLET ORAL at 06:55

## 2019-10-24 RX ADMIN — Medication 650 MILLIGRAM(S): at 06:48

## 2019-10-24 RX ADMIN — Medication 200 MILLIGRAM(S): at 06:49

## 2019-10-24 RX ADMIN — CELECOXIB 100 MILLIGRAM(S): 200 CAPSULE ORAL at 06:47

## 2019-10-24 RX ADMIN — ATORVASTATIN CALCIUM 80 MILLIGRAM(S): 80 TABLET, FILM COATED ORAL at 21:28

## 2019-10-24 RX ADMIN — Medication 650 MILLIGRAM(S): at 18:26

## 2019-10-24 RX ADMIN — Medication 650 MILLIGRAM(S): at 19:31

## 2019-10-24 NOTE — PROGRESS NOTE ADULT - SUBJECTIVE AND OBJECTIVE BOX
INTERVAL HPI/OVERNIGHT EVENTS:    Patient is a 64y old  Male who presents with a chief complaint of right knee pain (24 Oct 2019 07:44)      Pt reports the following symptoms:    CONSTITUTIONAL:  Negative fever or chills, feels well, good appetite  EYES:  Negative  blurry vision or double vision  CARDIOVASCULAR:  Negative for chest pain or palpitations  RESPIRATORY:  Negative for cough, wheezing, or SOB   GASTROINTESTINAL:  Negative for nausea, vomiting, diarrhea, constipation, or abdominal pain  GENITOURINARY:  Negative frequency, urgency or dysuria  NEUROLOGIC:  No headache, confusion, dizziness, lightheadedness    MEDICATIONS  (STANDING):  acetaminophen   Tablet .. 650 milliGRAM(s) Oral every 6 hours  atorvastatin 80 milliGRAM(s) Oral at bedtime  BUpivacaine liposome 1.3% Injectable (no eMAR) 20 milliLiter(s) Local Injection once  cefpodoxime 200 milliGRAM(s) Oral every 12 hours  celecoxib 100 milliGRAM(s) Oral two times a day  dextrose 10% Bolus 125 milliLiter(s) IV Bolus once  dextrose 10% Bolus 250 milliLiter(s) IV Bolus once  dextrose 5%. 1000 milliLiter(s) (50 mL/Hr) IV Continuous <Continuous>  dextrose 50% Injectable 12.5 Gram(s) IV Push once  dextrose 50% Injectable 25 Gram(s) IV Push once  dextrose 50% Injectable 25 Gram(s) IV Push once  DULoxetine 90 milliGRAM(s) Oral daily  insulin aspart (NovoLOG) Pump 1 Each SubCutaneous Continuous Pump  metoprolol succinate  milliGRAM(s) Oral <User Schedule>  omega-3-Acid Ethyl Esters 4 Gram(s) Oral daily  pantoprazole    Tablet 40 milliGRAM(s) Oral before breakfast  polyethylene glycol 3350 17 Gram(s) Oral daily  prasugrel 10 milliGRAM(s) Oral daily  saccharomyces boulardii 250 milliGRAM(s) Oral two times a day    MEDICATIONS  (PRN):  aluminum hydroxide/magnesium hydroxide/simethicone Suspension 30 milliLiter(s) Oral four times a day PRN Indigestion  bisacodyl Suppository 10 milliGRAM(s) Rectal daily PRN If no bowel movement by postoperative day #2  dextrose 40% Gel 15 Gram(s) Oral once PRN Blood Glucose LESS THAN 70 milliGRAM(s)/deciliter  glucagon  Injectable 1 milliGRAM(s) IntraMuscular once PRN Glucose LESS THAN 70 milligrams/deciliter  HYDROmorphone  Injectable 0.5 milliGRAM(s) IV Push every 4 hours PRN Breakthrough Pain  HYDROmorphone  Injectable 0.5 milliGRAM(s) IV Push every 30 minutes PRN Breakthrough Pain  morphine  - Injectable 2 milliGRAM(s) IV Push every 4 hours PRN Severe Pain (7 - 10)  ondansetron Injectable 4 milliGRAM(s) IV Push every 6 hours PRN Nausea and/or Vomiting  oxyCODONE    IR 5 milliGRAM(s) Oral every 4 hours PRN Mild Pain (1 - 3)  oxyCODONE    IR 10 milliGRAM(s) Oral every 4 hours PRN Moderate Pain (4 - 6)  senna 2 Tablet(s) Oral at bedtime PRN Constipation      PHYSICAL EXAM  Vital Signs Last 24 Hrs  T(C): 36.8 (24 Oct 2019 08:19), Max: 37.2 (23 Oct 2019 16:44)  T(F): 98.2 (24 Oct 2019 08:19), Max: 98.9 (23 Oct 2019 16:44)  HR: 96 (24 Oct 2019 08:19) (82 - 96)  BP: 103/60 (24 Oct 2019 08:19) (103/60 - 144/74)  BP(mean): --  RR: 18 (24 Oct 2019 08:19) (16 - 18)  SpO2: 94% (24 Oct 2019 08:19) (94% - 96%)    Constitutional: wn/wd in NAD.   HEENT: NCAT, MMM, OP clear, EOMI, no proptosis or lid retraction  Neck: no thyromegaly or palpable thyroid nodules   Respiratory: lungs CTAB.  Cardiovascular: regular rhythm, normal S1 and S2, no audible murmurs, no peripheral edema  GI: soft, NT/ND, no masses/HSM appreciated.  Neurology: no tremors, DTR 2+  Skin: no visible rashes/lesions  Psychiatric: AAO x 3, normal affect/mood.    LABS:                        11.7   13.49 )-----------( 166      ( 23 Oct 2019 15:13 )             37.9     10-23    138  |  103  |  17  ----------------------------<  259<H>  4.9   |  25  |  0.94    Ca    9.2      23 Oct 2019 15:13              HbA1C: 7.5 % (10-22 @ 07:00)    CAPILLARY BLOOD GLUCOSE      POCT Blood Glucose.: 70 mg/dL (24 Oct 2019 07:02)  POCT Blood Glucose.: 114 mg/dL (23 Oct 2019 21:52)  POCT Blood Glucose.: 205 mg/dL (23 Oct 2019 17:38)      Insulin Sliding Scale requirements X 24 Hours:    RADIOLOGY & ADDITIONAL TESTS:    A/P: 64y Male with history of DM type II presenting for       1.  DM -     Please continue           units lantus at bedtime  / in the morning and        units lispro with meals and lispro moderate / low dose sliding scale 4 times daily with meals and at bedtime.  Please continue consistent carbohydrate diet.      Goal FSG is   Will continue to monitor   For discharge, pt can continue    Pt can follow up at discharge with [facility] by calling  to make an appointment.   Will discuss case with     and update primary team INTERVAL HPI/OVERNIGHT EVENTS:    Patient seen and examined at the bedside. he is currently on insulin pump. He had some episodes of dizziness when he was getting physical therapy again today fort which he received some IV fluids yesterday. Today morning, his FSG was low again to 70  - for which he was given some juice and crackers. Given his dizziness, cardio saw the patient and wanted to get repeat ECHO    FSG & Insulin received:  Yesterday:  pre-dinner fs, 3 units bolus via the insulin pump, had shrimp, vegetables, pudding, coffee  bedtime fs  Today:  CGM showed that his blood glucose < 50 early in the morning from 500 AM to 700 AM  pre-breakfast fs, gave 1 cups of juice and crackers - campa, small fruit plate, eggs, oatmeal + raisin  1100AM fs, 3 units bolus , had bread sandwich, potato salad, pudding, soup    Pt reports the following symptoms:    CONSTITUTIONAL:  Negative fever or chills, feels well, good appetite  EYES:  Negative  blurry vision or double vision  CARDIOVASCULAR:  Negative for chest pain or palpitations  RESPIRATORY:  Negative for cough, wheezing, or SOB   GASTROINTESTINAL:  Negative for nausea, vomiting, diarrhea, constipation, or abdominal pain  GENITOURINARY:  Negative frequency, urgency or dysuria  NEUROLOGIC:  No headache, confusion    MEDICATIONS  (STANDING):  acetaminophen   Tablet .. 650 milliGRAM(s) Oral every 6 hours  atorvastatin 80 milliGRAM(s) Oral at bedtime  BUpivacaine liposome 1.3% Injectable (no eMAR) 20 milliLiter(s) Local Injection once  cefpodoxime 200 milliGRAM(s) Oral every 12 hours  celecoxib 100 milliGRAM(s) Oral two times a day  dextrose 10% Bolus 125 milliLiter(s) IV Bolus once  dextrose 10% Bolus 250 milliLiter(s) IV Bolus once  dextrose 5%. 1000 milliLiter(s) (50 mL/Hr) IV Continuous <Continuous>  dextrose 50% Injectable 12.5 Gram(s) IV Push once  dextrose 50% Injectable 25 Gram(s) IV Push once  dextrose 50% Injectable 25 Gram(s) IV Push once  DULoxetine 90 milliGRAM(s) Oral daily  insulin aspart (NovoLOG) Pump 1 Each SubCutaneous Continuous Pump  metoprolol succinate  milliGRAM(s) Oral <User Schedule>  omega-3-Acid Ethyl Esters 4 Gram(s) Oral daily  pantoprazole    Tablet 40 milliGRAM(s) Oral before breakfast  polyethylene glycol 3350 17 Gram(s) Oral daily  prasugrel 10 milliGRAM(s) Oral daily  saccharomyces boulardii 250 milliGRAM(s) Oral two times a day    MEDICATIONS  (PRN):  aluminum hydroxide/magnesium hydroxide/simethicone Suspension 30 milliLiter(s) Oral four times a day PRN Indigestion  bisacodyl Suppository 10 milliGRAM(s) Rectal daily PRN If no bowel movement by postoperative day #2  dextrose 40% Gel 15 Gram(s) Oral once PRN Blood Glucose LESS THAN 70 milliGRAM(s)/deciliter  glucagon  Injectable 1 milliGRAM(s) IntraMuscular once PRN Glucose LESS THAN 70 milligrams/deciliter  HYDROmorphone  Injectable 0.5 milliGRAM(s) IV Push every 4 hours PRN Breakthrough Pain  HYDROmorphone  Injectable 0.5 milliGRAM(s) IV Push every 30 minutes PRN Breakthrough Pain  morphine  - Injectable 2 milliGRAM(s) IV Push every 4 hours PRN Severe Pain (7 - 10)  ondansetron Injectable 4 milliGRAM(s) IV Push every 6 hours PRN Nausea and/or Vomiting  oxyCODONE    IR 5 milliGRAM(s) Oral every 4 hours PRN Mild Pain (1 - 3)  oxyCODONE    IR 10 milliGRAM(s) Oral every 4 hours PRN Moderate Pain (4 - 6)  senna 2 Tablet(s) Oral at bedtime PRN Constipation      PHYSICAL EXAM  Vital Signs Last 24 Hrs  T(C): 36.8 (24 Oct 2019 08:19), Max: 37.2 (23 Oct 2019 16:44)  T(F): 98.2 (24 Oct 2019 08:19), Max: 98.9 (23 Oct 2019 16:44)  HR: 96 (24 Oct 2019 08:19) (82 - 96)  BP: 103/60 (24 Oct 2019 08:19) (103/60 - 144/74)  BP(mean): --  RR: 18 (24 Oct 2019 08:19) (16 - 18)  SpO2: 94% (24 Oct 2019 08:19) (94% - 96%)    Constitutional: wn/wd in NAD.   Respiratory: lungs CTAB.  Cardiovascular: regular rhythm, normal S1 and S2,  no peripheral edema  GI: soft, NT/ND, no masses/HSM appreciated.  Neurology: no tremors, DTR 2+      LABS:                        11.7   13.49 )-----------( 166      ( 23 Oct 2019 15:13 )             37.9     10-23    138  |  103  |  17  ----------------------------<  259<H>  4.9   |  25  |  0.94    Ca    9.2      23 Oct 2019 15:13              HbA1C: 7.5 % (10-22 @ 07:00)    CAPILLARY BLOOD GLUCOSE      POCT Blood Glucose.: 70 mg/dL (24 Oct 2019 07:02)  POCT Blood Glucose.: 114 mg/dL (23 Oct 2019 21:52)  POCT Blood Glucose.: 205 mg/dL (23 Oct 2019 17:38)      Insulin Sliding Scale requirements X 24 Hours:    RADIOLOGY & ADDITIONAL TESTS:    A/P: 64y Male with history of DM type II presenting for       1.  DM -     Please continue           units lantus at bedtime  / in the morning and        units lispro with meals and lispro moderate / low dose sliding scale 4 times daily with meals and at bedtime.  Please continue consistent carbohydrate diet.      Goal FSG is   Will continue to monitor   For discharge, pt can continue    Pt can follow up at discharge with [facility] by calling  to make an appointment.   Will discuss case with     and update primary team INTERVAL HPI/OVERNIGHT EVENTS:    Patient seen and examined at the bedside. he is currently on insulin pump. He had some episodes of dizziness when he was getting physical therapy again today fort which he received some IV fluids yesterday. Today morning, his FSG was low again to 70  - for which he was given some juice and crackers. Given his dizziness, cardio saw the patient and wanted to get repeat ECHO    FSG & Insulin received:  Yesterday:  pre-dinner fs, 3 units bolus via the insulin pump, had shrimp, vegetables, pudding, coffee  bedtime fs  Today:  CGM showed that his blood glucose < 50 early in the morning from 500 AM to 700 AM  pre-breakfast fs, gave 1 cups of juice and crackers - campa, small fruit plate, eggs, oatmeal + raisin  1100AM fs, 3 units bolus , had bread sandwich, potato salad, pudding, soup    Pt reports the following symptoms:    CONSTITUTIONAL:  Negative fever or chills, feels well, good appetite  EYES:  Negative  blurry vision or double vision  CARDIOVASCULAR:  Negative for chest pain or palpitations  RESPIRATORY:  Negative for cough, wheezing, or SOB   GASTROINTESTINAL:  Negative for nausea, vomiting, diarrhea, constipation, or abdominal pain  GENITOURINARY:  Negative frequency, urgency or dysuria  NEUROLOGIC:  No headache, confusion    MEDICATIONS  (STANDING):  acetaminophen   Tablet .. 650 milliGRAM(s) Oral every 6 hours  atorvastatin 80 milliGRAM(s) Oral at bedtime  BUpivacaine liposome 1.3% Injectable (no eMAR) 20 milliLiter(s) Local Injection once  cefpodoxime 200 milliGRAM(s) Oral every 12 hours  celecoxib 100 milliGRAM(s) Oral two times a day  dextrose 10% Bolus 125 milliLiter(s) IV Bolus once  dextrose 10% Bolus 250 milliLiter(s) IV Bolus once  dextrose 5%. 1000 milliLiter(s) (50 mL/Hr) IV Continuous <Continuous>  dextrose 50% Injectable 12.5 Gram(s) IV Push once  dextrose 50% Injectable 25 Gram(s) IV Push once  dextrose 50% Injectable 25 Gram(s) IV Push once  DULoxetine 90 milliGRAM(s) Oral daily  insulin aspart (NovoLOG) Pump 1 Each SubCutaneous Continuous Pump  metoprolol succinate  milliGRAM(s) Oral <User Schedule>  omega-3-Acid Ethyl Esters 4 Gram(s) Oral daily  pantoprazole    Tablet 40 milliGRAM(s) Oral before breakfast  polyethylene glycol 3350 17 Gram(s) Oral daily  prasugrel 10 milliGRAM(s) Oral daily  saccharomyces boulardii 250 milliGRAM(s) Oral two times a day    MEDICATIONS  (PRN):  aluminum hydroxide/magnesium hydroxide/simethicone Suspension 30 milliLiter(s) Oral four times a day PRN Indigestion  bisacodyl Suppository 10 milliGRAM(s) Rectal daily PRN If no bowel movement by postoperative day #2  dextrose 40% Gel 15 Gram(s) Oral once PRN Blood Glucose LESS THAN 70 milliGRAM(s)/deciliter  glucagon  Injectable 1 milliGRAM(s) IntraMuscular once PRN Glucose LESS THAN 70 milligrams/deciliter  HYDROmorphone  Injectable 0.5 milliGRAM(s) IV Push every 4 hours PRN Breakthrough Pain  HYDROmorphone  Injectable 0.5 milliGRAM(s) IV Push every 30 minutes PRN Breakthrough Pain  morphine  - Injectable 2 milliGRAM(s) IV Push every 4 hours PRN Severe Pain (7 - 10)  ondansetron Injectable 4 milliGRAM(s) IV Push every 6 hours PRN Nausea and/or Vomiting  oxyCODONE    IR 5 milliGRAM(s) Oral every 4 hours PRN Mild Pain (1 - 3)  oxyCODONE    IR 10 milliGRAM(s) Oral every 4 hours PRN Moderate Pain (4 - 6)  senna 2 Tablet(s) Oral at bedtime PRN Constipation      PHYSICAL EXAM  Vital Signs Last 24 Hrs  T(C): 36.8 (24 Oct 2019 08:19), Max: 37.2 (23 Oct 2019 16:44)  T(F): 98.2 (24 Oct 2019 08:19), Max: 98.9 (23 Oct 2019 16:44)  HR: 96 (24 Oct 2019 08:19) (82 - 96)  BP: 103/60 (24 Oct 2019 08:19) (103/60 - 144/74)  BP(mean): --  RR: 18 (24 Oct 2019 08:19) (16 - 18)  SpO2: 94% (24 Oct 2019 08:19) (94% - 96%)    Constitutional: wn/wd in NAD.   Respiratory: lungs CTAB.  Cardiovascular: regular rhythm, normal S1 and S2,  no peripheral edema  GI: soft, NT/ND, no masses/HSM appreciated.  Neurology: no tremors, DTR 2+      LABS:                        11.7   13.49 )-----------( 166      ( 23 Oct 2019 15:13 )             37.9     10-23    138  |  103  |  17  ----------------------------<  259<H>  4.9   |  25  |  0.94    Ca    9.2      23 Oct 2019 15:13              HbA1C: 7.5 % (10-22 @ 07:00)    CAPILLARY BLOOD GLUCOSE      POCT Blood Glucose.: 70 mg/dL (24 Oct 2019 07:02)  POCT Blood Glucose.: 114 mg/dL (23 Oct 2019 21:52)  POCT Blood Glucose.: 205 mg/dL (23 Oct 2019 17:38)      Insulin Sliding Scale requirements X 24 Hours:    RADIOLOGY & ADDITIONAL TESTS:    A/P: Patient is 63 y/o maleDM, CVT, CHF, HLD, CAD  who presents with right knee OA s/p right total knee arthroplasty    1.  DM Type 2 - controlled - with neuropathy and retinopathy  Hba1c 7.5  Cr/GFR 0.97/97  Echo showed EF 30 to 35%  Wt 106.6 kg with BMI 31  Patient can continue to be on insulin pump - Patient signed the insulin pump policy.  We decreased the basal rate to 3.3 units/hr from Midnight to 700 AM and  to 3.6 units/hr from 700 AM till midnight given his morning FSG being low. These changes were made in the insulin pump.   PLease check FSG AC and HS  Will continue to monitor     For discharge, pt can be discharged back on insulin pump, Jardiance 10mg once daily and trulicity 1.5mg/once a week    Pt can follow up at discharge with , [facility] by calling  to make an appointment.   Discussed case with

## 2019-10-24 NOTE — CONSULT NOTE ADULT - SUBJECTIVE AND OBJECTIVE BOX
CHIEF COMPLAINT:  Dizzy  HISTORY OF PRESENT ILLNESS:      Chart, PMH, medication and allergies reviewed  PAST MEDICAL & SURGICAL HISTORY:  Diabetes  Blood clot due to device, implant, or graft: was on blood thinners  HLD (hyperlipidemia)  Chronic systolic CHF (congestive heart failure)  Osteoarthritis  Atherosclerosis of coronary artery: CAD (coronary artery disease)  Status post percutaneous transluminal coronary angioplasty: in 2012  S/P CABG x 1: 2018  Stented coronary artery: 10/18 heart attack  INFERIOR WALL MI  Other postprocedural status: Fixation hardware in foot LEFT      [  ] Diabetes   [  ] Hypertension  [  ] Hyperlipidemia  [  ] CAD  [  ] PCI  [  ] CABG    PREVIOUS DIAGNOSTIC TESTING:    [  ] Echocardiogram:  [  ]  Catheterization:  [  ] Stress Test:  	    MEDICATIONS:  MEDICATIONS  (STANDING):  acetaminophen   Tablet .. 650 milliGRAM(s) Oral every 6 hours  aspirin enteric coated 81 milliGRAM(s) Oral two times a day  atorvastatin 80 milliGRAM(s) Oral at bedtime  BUpivacaine liposome 1.3% Injectable (no eMAR) 20 milliLiter(s) Local Injection once  cefpodoxime 200 milliGRAM(s) Oral every 12 hours  celecoxib 100 milliGRAM(s) Oral two times a day  dextrose 10% Bolus 125 milliLiter(s) IV Bolus once  dextrose 10% Bolus 250 milliLiter(s) IV Bolus once  dextrose 5%. 1000 milliLiter(s) (50 mL/Hr) IV Continuous <Continuous>  dextrose 50% Injectable 12.5 Gram(s) IV Push once  dextrose 50% Injectable 25 Gram(s) IV Push once  dextrose 50% Injectable 25 Gram(s) IV Push once  DULoxetine 90 milliGRAM(s) Oral daily  insulin aspart (NovoLOG) Pump 1 Each SubCutaneous Continuous Pump  metoprolol succinate  milliGRAM(s) Oral <User Schedule>  omega-3-Acid Ethyl Esters 4 Gram(s) Oral daily  pantoprazole    Tablet 40 milliGRAM(s) Oral before breakfast  polyethylene glycol 3350 17 Gram(s) Oral daily  saccharomyces boulardii 250 milliGRAM(s) Oral two times a day      FAMILY HISTORY:  Family history of heart disease (Mother)  Family history of heart disease (Mother)      SOCIAL HISTORY:    [  ] Never smoker  [  ] Non-smoker  [  ] Smoker  [  ] Social alcohol use  [  ] Former smoker    Allergies    ACE inhibitors (Hives)  enalapril (Hives)    Intolerances    	    REVIEW OF SYSTEMS:  CONSTITUTIONAL: No fever, weight loss, or fatigue  EYES: No eye pain, visual disturbances, or discharge  ENT:  No difficulty hearing, tinnitus, vertigo; No sinus or throat pain  NECK: No pain or stiffness  PULMONARY: No cough, no wheezing, chills or hemoptysis; No Shortness of Breath  CARDIOVASCULAR: No chest pain, no  palpitations, no passing out, dizziness, no leg swelling  GASTROINTESTINAL: No abdominal  pain. No nausea, vomiting, or hematemesis; No diarrhea or constipation. No melena or hematochezia.  GENITOURINARY: No dysuria, frequency, hematuria, or incontinence  NEUROLOGICAL: No headaches, memory loss, loss of strength, numbness, or tremors  SKIN: No itching, burning, rashes, or lesions   LYMPH Nodes: No enlarged glands  ENDOCRINE: No heat or cold intolerance; No hair loss  MUSCULOSKELETAL: No joint pain or swelling; No muscle, back, or extremity pain  PSYCHIATRIC: No depression, anxiety, mood swings, or difficulty sleeping  HEME/LYMPH: No easy bruising, or bleeding gums  ALLERGY AND IMMUNOLOGIC: No hives or eczema	    PHYSICAL EXAM:  T(C): 36.7 (10-24-19 @ 05:24), Max: 37.2 (10-23-19 @ 16:44)  HR: 89 (10-24-19 @ 06:00) (71 - 92)  BP: 121/67 (10-24-19 @ 06:00) (89/47 - 144/74)  RR: 18 (10-24-19 @ 06:00) (16 - 18)  SpO2: 96% (10-24-19 @ 06:00) (94% - 97%)  Wt(kg): --  I&O's Summary    23 Oct 2019 07:01  -  24 Oct 2019 07:00  --------------------------------------------------------  IN: 480 mL / OUT: 1500 mL / NET: -1020 mL        Appearance: Normal	  HEENT:   Normal oral mucosa, PERRL, EOMI	  Lymphatic: No lymphadenopathy  Cardiovascular: Normal S1 S2, No JVD, No murmur, No edema  Pulmonary: Lungs clear to auscultation	  Psychiatry: A & O x 3, Mood & affect appropriate  Gastrointestinal:  Soft, Non-tender, + BS	  Skin: No rashes, No ecchymoses, No cyanosis	  Neurologic: Non-focal  Extremities: Normal range of motion, No clubbing or cyanosis  	 	  CARDIAC MARKERS:                                11.7   13.49 )-----------( 166      ( 23 Oct 2019 15:13 )             37.9     10-23    138  |  103  |  17  ----------------------------<  259<H>  4.9   |  25  |  0.94    Ca    9.2      23 Oct 2019 15:13      proBNP:  Lipid Profile:  HgA1c:  TSH:    TELEMETRY: 	    ECG:  	QTC  RADIOLOGY:	    ASSESSMENT/PLAN: CHIEF COMPLAINT:  Dizzy  HISTORY OF PRESENT ILLNESS:  The patient has a history of STEMI. He has had coronary stent thrombosis even while on Plavix. He has a history of coronary bypass surgery. In October of 2018 the patient had a stent placed in his right coronary artery. The angiogram in late 2018 showed a 95% first obtuse marginal which was successfully stented with a drug-eluting stent. The bypass to the LAD was patent. The echocardiogram in June of 2019 revealed an ejection fraction of 30-35%. The pulmonary artery pressure was slightly elevated. The patient has not had any chest pain or dyspnea. He had dizziness while getting physical therapy yesterday and his blood pressure was low. In the afternoon he felt improved.    Chart, PMH, medication and allergies reviewed  PAST MEDICAL & SURGICAL HISTORY:  Diabetes  Blood clot due to device, implant, or graft: was on blood thinners  HLD (hyperlipidemia)  Chronic systolic CHF (congestive heart failure)  Osteoarthritis  Atherosclerosis of coronary artery: CAD (coronary artery disease)  Status post percutaneous transluminal coronary angioplasty: in 2012  S/P CABG x 1: 2018  Stented coronary artery: 10/18 heart attack  INFERIOR WALL MI  Other postprocedural status: Fixation hardware in foot LEFT      [ x ] Diabetes   [x  ] Hypertension  [ x ] Hyperlipidemia  [x  ] CAD  [x  ] PCI  [x  ] CABG    PREVIOUS DIAGNOSTIC TESTING:    [ x ] Echocardiogram:  [x  ]  Catheterization:  [ x ] Stress Test:  	    MEDICATIONS:  MEDICATIONS  (STANDING):  acetaminophen   Tablet .. 650 milliGRAM(s) Oral every 6 hours  aspirin enteric coated 81 milliGRAM(s) Oral two times a day  atorvastatin 80 milliGRAM(s) Oral at bedtime  BUpivacaine liposome 1.3% Injectable (no eMAR) 20 milliLiter(s) Local Injection once  cefpodoxime 200 milliGRAM(s) Oral every 12 hours  celecoxib 100 milliGRAM(s) Oral two times a day  dextrose 10% Bolus 125 milliLiter(s) IV Bolus once  dextrose 10% Bolus 250 milliLiter(s) IV Bolus once  dextrose 5%. 1000 milliLiter(s) (50 mL/Hr) IV Continuous <Continuous>  dextrose 50% Injectable 12.5 Gram(s) IV Push once  dextrose 50% Injectable 25 Gram(s) IV Push once  dextrose 50% Injectable 25 Gram(s) IV Push once  DULoxetine 90 milliGRAM(s) Oral daily  insulin aspart (NovoLOG) Pump 1 Each SubCutaneous Continuous Pump  metoprolol succinate  milliGRAM(s) Oral <User Schedule>  omega-3-Acid Ethyl Esters 4 Gram(s) Oral daily  pantoprazole    Tablet 40 milliGRAM(s) Oral before breakfast  polyethylene glycol 3350 17 Gram(s) Oral daily  saccharomyces boulardii 250 milliGRAM(s) Oral two times a day      FAMILY HISTORY:  Family history of heart disease (Mother)  Family history of heart disease (Mother)      SOCIAL HISTORY:    [  ] Never smoker  [  ] Non-smoker  [  ] Smoker  [  ] Social alcohol use  [ x ] Former smoker    Allergies    ACE inhibitors (Hives)  enalapril (Hives)    Intolerances    	    REVIEW OF SYSTEMS:  CONSTITUTIONAL: No fever, weight loss, or fatigue  EYES: No eye pain, visual disturbances, or discharge  ENT:  No difficulty hearing, tinnitus, vertigo; No sinus or throat pain  NECK: No pain or stiffness  PULMONARY: No cough, no wheezing, chills or hemoptysis; No Shortness of Breath  CARDIOVASCULAR: No chest pain, no  palpitations, no passing out, +dizziness, no leg swelling  GASTROINTESTINAL: No abdominal  pain. No nausea, vomiting, or hematemesis; No diarrhea or constipation. No melena or hematochezia.  GENITOURINARY: No dysuria, frequency, hematuria, or incontinence  NEUROLOGICAL: No headaches, memory loss, loss of strength, numbness, or tremors  SKIN: No itching, burning, rashes, or lesions   LYMPH Nodes: No enlarged glands  ENDOCRINE: No heat or cold intolerance; No hair loss  MUSCULOSKELETAL: +joint pain or swelling; No muscle, back, or extremity pain  PSYCHIATRIC: No depression, anxiety, mood swings, or difficulty sleeping  HEME/LYMPH: No easy bruising, or bleeding gums  ALLERGY AND IMMUNOLOGIC: No hives or eczema	    PHYSICAL EXAM:  T(C): 36.7 (10-24-19 @ 05:24), Max: 37.2 (10-23-19 @ 16:44)  HR: 89 (10-24-19 @ 06:00) (71 - 92)  BP: 121/67 (10-24-19 @ 06:00) (89/47 - 144/74)  RR: 18 (10-24-19 @ 06:00) (16 - 18)  SpO2: 96% (10-24-19 @ 06:00) (94% - 97%)  Wt(kg): --  I&O's Summary    23 Oct 2019 07:01  -  24 Oct 2019 07:00  --------------------------------------------------------  IN: 480 mL / OUT: 1500 mL / NET: -1020 mL        Appearance: Normal	  HEENT:   Normal oral mucosa, PERRL, EOMI	  Lymphatic: No lymphadenopathy  Cardiovascular: Normal S1 S2, No JVD, No murmur, No edema  Pulmonary: Lungs clear to auscultation	  Psychiatry: A & O x 3, Mood & affect appropriate  Gastrointestinal:  Soft, Non-tender, + BS	  Skin: No rashes, No ecchymoses, No cyanosis	  Neurologic: Non-focal  Extremities: Normal range of motion, No clubbing or cyanosis  	 	  CARDIAC MARKERS:                                11.7   13.49 )-----------( 166      ( 23 Oct 2019 15:13 )             37.9     10-23    138  |  103  |  17  ----------------------------<  259<H>  4.9   |  25  |  0.94    Ca    9.2      23 Oct 2019 15:13      proBNP:  Lipid Profile:  HgA1c:  TSH:    TELEMETRY: 	4 beat VT    ECG:  	QTC  RADIOLOGY:	    ASSESSMENT/PLAN: 	  Coronary artery disease-the patient has no chest pain. His last angiogram was less than a year ago. He is a very high clotting risk. Restart prasugrel as soon as possible. Continue aspirin statin and metoprolol.    Cardiomyopathy -no clinical congestive heart failure. Monitor closely with fluids.     Ventricular arrhythmia-asymptomatic. Continue metoprolol.    Status post right knee replacement-as per .

## 2019-10-24 NOTE — PROGRESS NOTE ADULT - SUBJECTIVE AND OBJECTIVE BOX
Ortho Note    Pt comfortable without complaints, pain controlled  Denies CP, SOB, N/V, numbness/tingling   Patient without signs/symptoms concerning for acute limb ischemia overnight    Vital Signs Last 24 Hrs  T(C): 37.2 (24 Oct 2019 00:54), Max: 37.2 (23 Oct 2019 16:44)  T(F): 98.9 (24 Oct 2019 00:54), Max: 98.9 (23 Oct 2019 16:44)  HR: 83 (24 Oct 2019 00:54) (71 - 92)  BP: 120/69 (24 Oct 2019 00:54) (89/47 - 144/74)  BP(mean): --  RR: 16 (24 Oct 2019 00:54) (16 - 18)  SpO2: 96% (24 Oct 2019 00:54) (94% - 97%)      AVSS  General: Pt Alert and oriented, NAD  RLE: [place] DSG C/D/I; Pt w bone foam under heel to promote terminal knee ext  Pulses: WWP; Palpable DP pulse; Cap refill < 2 sec  Sensation: Sensation mildly decreased over b/l plantar surfaces consistent with baseline as per patient; Otherwise SILT and symmetric  Motor: EHL/FHL/TA/GS 5/5 and symmetric to contralateral extremity                 Labs:                        11.7   13.49 )-----------( 166      ( 23 Oct 2019 15:13 )             37.9       10-23    138  |  103  |  17  ----------------------------<  259<H>  4.9   |  25  |  0.94    Ca    9.2      23 Oct 2019 15:13 Ortho Note    Pt comfortable without complaints, pain controlled  Denies CP, SOB, N/V, numbness/tingling   Patient without signs/symptoms concerning for acute limb ischemia overnight    Vital Signs Last 24 Hrs  T(C): 36.7 (24 Oct 2019 05:24), Max: 37.2 (23 Oct 2019 16:44)  T(F): 98.1 (24 Oct 2019 05:24), Max: 98.9 (23 Oct 2019 16:44)  HR: 89 (24 Oct 2019 06:00) (78 - 92)  BP: 121/67 (24 Oct 2019 06:00) (89/47 - 144/74)  BP(mean): --  RR: 18 (24 Oct 2019 06:00) (16 - 18)  SpO2: 96% (24 Oct 2019 06:00) (94% - 97%)      AVSS  General: Pt Alert and oriented, NAD  RLE: [place] DSG C/D/I; Pt w bone foam under heel to promote terminal knee ext  Pulses: WWP; Palpable DP pulse; Cap refill < 2 sec  Sensation: Sensation mildly decreased over b/l plantar surfaces consistent with baseline as per patient; Otherwise SILT and symmetric  Motor: EHL/FHL/TA/GS 5/5 and symmetric to contralateral extremity                 Labs:                        11.7   13.49 )-----------( 166      ( 23 Oct 2019 15:13 )             37.9       10-23    138  |  103  |  17  ----------------------------<  259<H>  4.9   |  25  |  0.94    Ca    9.2      23 Oct 2019 15:13

## 2019-10-24 NOTE — PROGRESS NOTE ADULT - SUBJECTIVE AND OBJECTIVE BOX
Ortho Note    Pt comfortable without complaints, pain controlled  Denies CP, SOB, N/V, numbness/tingling. Denies dizziness/ lightheadedness.  Reports diarrhea that was present pre-op has improved, and has not has episode since late Monday/ early Tuesday.    Vital Signs Last 24 Hrs  T(C): 36.8 (10-24-19 @ 08:19), Max: 36.8 (10-24-19 @ 08:19)  T(F): 98.2 (10-24-19 @ 08:19), Max: 98.2 (10-24-19 @ 08:19)  HR: 96 (10-24-19 @ 08:19) (96 - 96)  BP: 103/60 (10-24-19 @ 08:19) (103/60 - 103/60)  BP(mean): --  RR: 18 (10-24-19 @ 08:19) (18 - 18)  SpO2: 94% (10-24-19 @ 08:19) (94% - 94%)  AVSS    focues exam:  General: Pt Alert and oriented, NAD  DSG- aquacel to R knee CDI  Pulses: +DP, feet cool to touch at baseline (followed outpatient by vascular, cardiology)  Sensation: SILT  Motor: 5/5 EHL/FHL/TA/GS                          11.7   13.49 )-----------( 166      ( 23 Oct 2019 15:13 )             37.9   23 Oct 2019 15:13    138    |  103    |  17     ----------------------------<  259    4.9     |  25     |  0.94           A/P: 64yMale POD#3 s/p right total knee replacement  - Stable  - Pain Control- pain well-controlled; promoted non-opioids for pain control  - DVT ppx: effient 10mg qd + ASA 81mg qd (home regimen, recommended by cardiologist Dr. Caldera)  - PT, WBS: WBAT, bone foam in bed to promote extension  - orthostatic hypotension- improved today- continue PO intake of fluids  - echo prior to d/c - cardiology rec  - OOB for meals, I/S  - dispo: home with HPT, pending clearance    Ortho Pager [phone]

## 2019-10-25 VITALS — WEIGHT: 182.98 LBS

## 2019-10-25 LAB
GLUCOSE BLDC GLUCOMTR-MCNC: 208 MG/DL — HIGH (ref 70–99)
GLUCOSE BLDC GLUCOMTR-MCNC: 301 MG/DL — HIGH (ref 70–99)

## 2019-10-25 PROCEDURE — 99239 HOSP IP/OBS DSCHRG MGMT >30: CPT

## 2019-10-25 PROCEDURE — 99233 SBSQ HOSP IP/OBS HIGH 50: CPT

## 2019-10-25 RX ORDER — INSULIN ASPART 100 [IU]/ML
1 INJECTION, SOLUTION SUBCUTANEOUS
Refills: 0 | Status: DISCONTINUED | OUTPATIENT
Start: 2019-10-25 | End: 2019-10-25

## 2019-10-25 RX ADMIN — OXYCODONE HYDROCHLORIDE 10 MILLIGRAM(S): 5 TABLET ORAL at 13:18

## 2019-10-25 RX ADMIN — Medication 650 MILLIGRAM(S): at 06:38

## 2019-10-25 RX ADMIN — POLYETHYLENE GLYCOL 3350 17 GRAM(S): 17 POWDER, FOR SOLUTION ORAL at 11:39

## 2019-10-25 RX ADMIN — Medication 650 MILLIGRAM(S): at 11:41

## 2019-10-25 RX ADMIN — Medication 200 MILLIGRAM(S): at 06:37

## 2019-10-25 RX ADMIN — PANTOPRAZOLE SODIUM 40 MILLIGRAM(S): 20 TABLET, DELAYED RELEASE ORAL at 06:37

## 2019-10-25 RX ADMIN — PRASUGREL 10 MILLIGRAM(S): 5 TABLET, FILM COATED ORAL at 11:39

## 2019-10-25 RX ADMIN — Medication 650 MILLIGRAM(S): at 11:40

## 2019-10-25 RX ADMIN — OXYCODONE HYDROCHLORIDE 10 MILLIGRAM(S): 5 TABLET ORAL at 13:50

## 2019-10-25 RX ADMIN — Medication 650 MILLIGRAM(S): at 07:28

## 2019-10-25 RX ADMIN — Medication 250 MILLIGRAM(S): at 06:37

## 2019-10-25 RX ADMIN — Medication 4 GRAM(S): at 11:40

## 2019-10-25 RX ADMIN — DULOXETINE HYDROCHLORIDE 90 MILLIGRAM(S): 30 CAPSULE, DELAYED RELEASE ORAL at 11:40

## 2019-10-25 RX ADMIN — CELECOXIB 100 MILLIGRAM(S): 200 CAPSULE ORAL at 07:28

## 2019-10-25 RX ADMIN — CELECOXIB 100 MILLIGRAM(S): 200 CAPSULE ORAL at 06:37

## 2019-10-25 RX ADMIN — Medication 81 MILLIGRAM(S): at 11:39

## 2019-10-25 NOTE — PROGRESS NOTE ADULT - ATTENDING COMMENTS
The patient was seen and evaluated at the bedside. He is using his Medtronic pump but not on the automatic setting.Glucoses yesterday were [phone] and today -[phone]-. I agree with decreasing his midnight to 6 AM basal rate to 3.6 units/hour.
The patient was seen at the bedside with Dr. Cardenas,endocrinology Fellow.He has been running low glucose levels as he is using his basal insulin as a meal blanket. I agree with decreasing his basal rate from midnight to 7 AM to 3,3 and his basal rate from 7 AM to midnight to 3.6 units.
The patient wasseen at the  bedside with Dr. Cardenas. Corrections to his two basal rates were made as agreed upon.Patient advised to get help withcarb counting and further 670 pump training.

## 2019-10-25 NOTE — DIETITIAN INITIAL EVALUATION ADULT. - OTHER INFO
64M with a PMH of MI- stent thrombosis in 2005 and then again last year in 2018, HTN, HLD, chronic CHFrEF, EF 40%, IDDM (insulin pump- A1C 7.5), depression, and CAD s/p PCI in 2013 and s/p uncomplicated robotic MIDCAB on 6/1/18. Now presenting for elective TKR now POD4. Lethargic at time of visit however noting no n/v/d/c, chewing/ swallowing issues or pain impacting intake, skin with surgical incision. NKFA or changes in wt noted. Encouraged intake through day and reinforced DM diet. Will continue to follow per protocol.

## 2019-10-25 NOTE — DIETITIAN INITIAL EVALUATION ADULT. - ENERGY NEEDS
Ideal body weight used for calculations as pt >120% of IBW.   .6kg, IBW 83kg, 127% IBW, ht 73", BMI 31   Nutrient needs based on [facility] standards of care for maintenance in adults, adjusted for post op needs/ CHF, fluid per team

## 2019-10-25 NOTE — PROGRESS NOTE ADULT - PROVIDER SPECIALTY LIST ADULT
Cardiology
Endocrinology
Internal Medicine
Internal Medicine
Orthopedics
Endocrinology
Internal Medicine
Orthopedics
Endocrinology

## 2019-10-25 NOTE — PROGRESS NOTE ADULT - SUBJECTIVE AND OBJECTIVE BOX
INTERVAL HISTORY:  The patient was dizzy walking in the [place]. He has not had dyspnea.	  Chart, [facility] medications and allergies reviewed    MEDICATIONS:  MEDICATIONS  (STANDING):  acetaminophen   Tablet .. 650 milliGRAM(s) Oral every 6 hours  aspirin enteric coated 81 milliGRAM(s) Oral daily  atorvastatin 80 milliGRAM(s) Oral at bedtime  BUpivacaine liposome 1.3% Injectable (no eMAR) 20 milliLiter(s) Local Injection once  cefpodoxime 200 milliGRAM(s) Oral every 12 hours  celecoxib 100 milliGRAM(s) Oral two times a day  dextrose 10% Bolus 125 milliLiter(s) IV Bolus once  dextrose 10% Bolus 250 milliLiter(s) IV Bolus once  dextrose 5%. 1000 milliLiter(s) (50 mL/Hr) IV Continuous <Continuous>  dextrose 50% Injectable 12.5 Gram(s) IV Push once  dextrose 50% Injectable 25 Gram(s) IV Push once  dextrose 50% Injectable 25 Gram(s) IV Push once  DULoxetine 90 milliGRAM(s) Oral daily  insulin aspart (NovoLOG) Pump 1 Each SubCutaneous Continuous Pump  metoprolol succinate  milliGRAM(s) Oral <User Schedule>  omega-3-Acid Ethyl Esters 4 Gram(s) Oral daily  pantoprazole    Tablet 40 milliGRAM(s) Oral before breakfast  polyethylene glycol 3350 17 Gram(s) Oral daily  prasugrel 10 milliGRAM(s) Oral daily  saccharomyces boulardii 250 milliGRAM(s) Oral two times a day      REVIEW OF SYSTEMS:  CONSTITUTIONAL: No fever, no weight loss, no fatigue  PULMONARY: No cough, no wheezing, chills no hemoptysis; No Shortness of Breath  CARDIOVASCULAR: No chest pain, no palpitations, no syncope,+ dizziness, no leg swelling  GASTROINTESTINAL: No abdominal pain. No nausea, no  vomiting, no hematemesis; No diarrhea, no constipation. No melena, no hematochezia.  GENITOURINARY: No dysuria, no frequency, no hematuria, no incontinence  SKIN: No itching, no burning, no rashes, no lesions     PHYSICAL EXAM:  T(C): 36.6 (10-25-19 @ 05:04), Max: 37.4 (10-24-19 @ 16:00)  HR: 74 (10-25-19 @ 05:04) (74 - 102)  BP: 111/68 (10-25-19 @ 05:04) (103/60 - 143/81)  RR: 17 (10-25-19 @ 05:04) (17 - 18)  SpO2: 97% (10-25-19 @ 05:04) (94% - 97%)  Wt(kg): --  I&O's Summary    24 Oct 2019 07:01  -  25 Oct 2019 07:00  --------------------------------------------------------  IN: 2275 mL / OUT: 2050 mL / NET: 225 mL        Appearance:  No deformities, normal appearance	  HEENT:   Normal oral mucosa, PERRL, EOMI	  Neck: No jvd , no masses  Lymphatic: No  lymphadenopathy  Pulmonary: Lungs clear to auscultation and percussion  Cardiovascular: Normal S1 S2, No JVD, No murmur, No edema	  Abdomen:  Soft, Non-tender, + BS  Skin: No rashes, No ecchymoses	  Extremities:  No clubbing or cyanosis  MSK: Normal range of motion, no joint swelling    LABS:		  CARDIAC MARKERS:                         11.7   13.49 )-----------( 166      ( 23 Oct 2019 15:13 )             37.9   10-23    138  |  103  |  17  ----------------------------<  259<H>  4.9   |  25  |  0.94    Ca    9.2      23 Oct 2019 15:13      proBNP:  Lipid Profile:  HgA1c:  TSH:      TELEMETRY: 	      QTC     ECG:  	   QTC         RADIOLOGY:   DIAGNOSTIC TESTING: [ ] Echocardiogram: [ ]  Catheterization: [ ] Stress Test:      ASSESSMENT/PLAN: 	  Coronary artery disease-the patient has no chest pain. His last angiogram was less than a year ago. He is a very high clotting risk. Restarted prasugrel.. Continue aspirin statin and metoprolol.    Cardiomyopathy -no clinical congestive heart failure. Monitor closely with fluids. Ejection fraction is 30%. Will reconsult electrophysiology soon.    Ventricular arrhythmia-Nonsustained VT, asymptomatic. Continue metoprolol.    Status post right knee replacement-as per .      Discussed with PA.

## 2019-10-25 NOTE — PROGRESS NOTE ADULT - SUBJECTIVE AND OBJECTIVE BOX
INTERVAL HPI/OVERNIGHT EVENTS:    Patient is a 64y old  Male who presents with a chief complaint of right knee pain (25 Oct 2019 08:09)      Pt reports the following symptoms:    CONSTITUTIONAL:  Negative fever or chills, feels well, good appetite  EYES:  Negative  blurry vision or double vision  CARDIOVASCULAR:  Negative for chest pain or palpitations  RESPIRATORY:  Negative for cough, wheezing, or SOB   GASTROINTESTINAL:  Negative for nausea, vomiting, diarrhea, constipation, or abdominal pain  GENITOURINARY:  Negative frequency, urgency or dysuria  NEUROLOGIC:  No headache, confusion, dizziness, lightheadedness    MEDICATIONS  (STANDING):  acetaminophen   Tablet .. 650 milliGRAM(s) Oral every 6 hours  aspirin enteric coated 81 milliGRAM(s) Oral daily  atorvastatin 80 milliGRAM(s) Oral at bedtime  BUpivacaine liposome 1.3% Injectable (no eMAR) 20 milliLiter(s) Local Injection once  cefpodoxime 200 milliGRAM(s) Oral every 12 hours  celecoxib 100 milliGRAM(s) Oral two times a day  dextrose 10% Bolus 125 milliLiter(s) IV Bolus once  dextrose 10% Bolus 250 milliLiter(s) IV Bolus once  dextrose 5%. 1000 milliLiter(s) (50 mL/Hr) IV Continuous <Continuous>  dextrose 50% Injectable 12.5 Gram(s) IV Push once  dextrose 50% Injectable 25 Gram(s) IV Push once  dextrose 50% Injectable 25 Gram(s) IV Push once  DULoxetine 90 milliGRAM(s) Oral daily  insulin aspart (NovoLOG) Pump 1 Each SubCutaneous Continuous Pump  metoprolol succinate  milliGRAM(s) Oral <User Schedule>  omega-3-Acid Ethyl Esters 4 Gram(s) Oral daily  pantoprazole    Tablet 40 milliGRAM(s) Oral before breakfast  polyethylene glycol 3350 17 Gram(s) Oral daily  prasugrel 10 milliGRAM(s) Oral daily  saccharomyces boulardii 250 milliGRAM(s) Oral two times a day    MEDICATIONS  (PRN):  aluminum hydroxide/magnesium hydroxide/simethicone Suspension 30 milliLiter(s) Oral four times a day PRN Indigestion  bisacodyl Suppository 10 milliGRAM(s) Rectal daily PRN If no bowel movement by postoperative day #2  dextrose 40% Gel 15 Gram(s) Oral once PRN Blood Glucose LESS THAN 70 milliGRAM(s)/deciliter  glucagon  Injectable 1 milliGRAM(s) IntraMuscular once PRN Glucose LESS THAN 70 milligrams/deciliter  HYDROmorphone  Injectable 0.5 milliGRAM(s) IV Push every 4 hours PRN Breakthrough Pain  HYDROmorphone  Injectable 0.5 milliGRAM(s) IV Push every 30 minutes PRN Breakthrough Pain  morphine  - Injectable 2 milliGRAM(s) IV Push every 4 hours PRN Severe Pain (7 - 10)  ondansetron Injectable 4 milliGRAM(s) IV Push every 6 hours PRN Nausea and/or Vomiting  oxyCODONE    IR 5 milliGRAM(s) Oral every 4 hours PRN Mild Pain (1 - 3)  oxyCODONE    IR 10 milliGRAM(s) Oral every 4 hours PRN Moderate Pain (4 - 6)  senna 2 Tablet(s) Oral at bedtime PRN Constipation      PHYSICAL EXAM  Vital Signs Last 24 Hrs  T(C): 36.6 (25 Oct 2019 05:04), Max: 37.4 (24 Oct 2019 16:00)  T(F): 97.8 (25 Oct 2019 05:04), Max: 99.4 (24 Oct 2019 16:00)  HR: 74 (25 Oct 2019 05:04) (74 - 102)  BP: 111/68 (25 Oct 2019 05:04) (111/68 - 143/81)  BP(mean): --  RR: 17 (25 Oct 2019 05:04) (17 - 18)  SpO2: 97% (25 Oct 2019 05:04) (94% - 97%)    Constitutional: wn/wd in NAD.   HEENT: NCAT, MMM, OP clear, EOMI, no proptosis or lid retraction  Neck: no thyromegaly or palpable thyroid nodules   Respiratory: lungs CTAB.  Cardiovascular: regular rhythm, normal S1 and S2, no audible murmurs, no peripheral edema  GI: soft, NT/ND, no masses/HSM appreciated.  Neurology: no tremors, DTR 2+  Skin: no visible rashes/lesions  Psychiatric: AAO x 3, normal affect/mood.    LABS:                        11.7   13.49 )-----------( 166      ( 23 Oct 2019 15:13 )             37.9     10-23    138  |  103  |  17  ----------------------------<  259<H>  4.9   |  25  |  0.94    Ca    9.2      23 Oct 2019 15:13              HbA1C: 7.5 % (10-22 @ 07:00)    CAPILLARY BLOOD GLUCOSE      POCT Blood Glucose.: 208 mg/dL (25 Oct 2019 06:50)  POCT Blood Glucose.: 291 mg/dL (24 Oct 2019 21:59)  POCT Blood Glucose.: 244 mg/dL (24 Oct 2019 17:25)      Insulin Sliding Scale requirements X 24 Hours:    RADIOLOGY & ADDITIONAL TESTS:    A/P: 64y Male with history of DM type II presenting for       1.  DM -     Please continue           units lantus at bedtime  / in the morning and        units lispro with meals and lispro moderate / low dose sliding scale 4 times daily with meals and at bedtime.  Please continue consistent carbohydrate diet.      Goal FSG is   Will continue to monitor   For discharge, pt can continue    Pt can follow up at discharge with [facility] by calling  to make an appointment.   Will discuss case with     and update primary team INTERVAL HPI/OVERNIGHT EVENTS:    Patient seen and examined at the bedside. No acute events overnight. He is currently on insulin pump. His CGM sensor was off and he is going to get it set up. He is being planned for discharge today. he was able to get therapy services. His ECHO showed 30% with moderate to severe global dysfunction and severe pulmonary hypertension.    FSG & Insulin received:  Yesterday:  pre-dinner fs, 5 units bolus via the insulin pump, had shrimp, potato salad, pudding, banana  bedtime fs  Today:  pre-breakfast fs, 5 units bolus, had 2 [place] toast and chocolate pudding  1100AM fs, 5 units bolus , had grilled cheese sandwich, tomatoes, potato salad, banana    Pt reports the following symptoms:    CONSTITUTIONAL:  Negative fever or chills, feels well, good appetite  EYES:  Negative  blurry vision or double vision  CARDIOVASCULAR:  Negative for chest pain or palpitations  RESPIRATORY:  Negative for cough, wheezing, or SOB   GASTROINTESTINAL:  Negative for nausea, vomiting, diarrhea, constipation, or abdominal pain  GENITOURINARY:  Negative frequency, urgency or dysuria  NEUROLOGIC:  No headache, confusion, dizziness, lightheadedness    MEDICATIONS  (STANDING):  acetaminophen   Tablet .. 650 milliGRAM(s) Oral every 6 hours  aspirin enteric coated 81 milliGRAM(s) Oral daily  atorvastatin 80 milliGRAM(s) Oral at bedtime  BUpivacaine liposome 1.3% Injectable (no eMAR) 20 milliLiter(s) Local Injection once  cefpodoxime 200 milliGRAM(s) Oral every 12 hours  celecoxib 100 milliGRAM(s) Oral two times a day  dextrose 10% Bolus 125 milliLiter(s) IV Bolus once  dextrose 10% Bolus 250 milliLiter(s) IV Bolus once  dextrose 5%. 1000 milliLiter(s) (50 mL/Hr) IV Continuous <Continuous>  dextrose 50% Injectable 12.5 Gram(s) IV Push once  dextrose 50% Injectable 25 Gram(s) IV Push once  dextrose 50% Injectable 25 Gram(s) IV Push once  DULoxetine 90 milliGRAM(s) Oral daily  insulin aspart (NovoLOG) Pump 1 Each SubCutaneous Continuous Pump  metoprolol succinate  milliGRAM(s) Oral <User Schedule>  omega-3-Acid Ethyl Esters 4 Gram(s) Oral daily  pantoprazole    Tablet 40 milliGRAM(s) Oral before breakfast  polyethylene glycol 3350 17 Gram(s) Oral daily  prasugrel 10 milliGRAM(s) Oral daily  saccharomyces boulardii 250 milliGRAM(s) Oral two times a day    MEDICATIONS  (PRN):  aluminum hydroxide/magnesium hydroxide/simethicone Suspension 30 milliLiter(s) Oral four times a day PRN Indigestion  bisacodyl Suppository 10 milliGRAM(s) Rectal daily PRN If no bowel movement by postoperative day #2  dextrose 40% Gel 15 Gram(s) Oral once PRN Blood Glucose LESS THAN 70 milliGRAM(s)/deciliter  glucagon  Injectable 1 milliGRAM(s) IntraMuscular once PRN Glucose LESS THAN 70 milligrams/deciliter  HYDROmorphone  Injectable 0.5 milliGRAM(s) IV Push every 4 hours PRN Breakthrough Pain  HYDROmorphone  Injectable 0.5 milliGRAM(s) IV Push every 30 minutes PRN Breakthrough Pain  morphine  - Injectable 2 milliGRAM(s) IV Push every 4 hours PRN Severe Pain (7 - 10)  ondansetron Injectable 4 milliGRAM(s) IV Push every 6 hours PRN Nausea and/or Vomiting  oxyCODONE    IR 5 milliGRAM(s) Oral every 4 hours PRN Mild Pain (1 - 3)  oxyCODONE    IR 10 milliGRAM(s) Oral every 4 hours PRN Moderate Pain (4 - 6)  senna 2 Tablet(s) Oral at bedtime PRN Constipation      PHYSICAL EXAM  Vital Signs Last 24 Hrs  T(C): 36.6 (25 Oct 2019 05:04), Max: 37.4 (24 Oct 2019 16:00)  T(F): 97.8 (25 Oct 2019 05:04), Max: 99.4 (24 Oct 2019 16:00)  HR: 74 (25 Oct 2019 05:04) (74 - 102)  BP: 111/68 (25 Oct 2019 05:04) (111/68 - 143/81)  BP(mean): --  RR: 17 (25 Oct 2019 05:04) (17 - 18)  SpO2: 97% (25 Oct 2019 05:04) (94% - 97%)    Constitutional: wn/wd in NAD.   Respiratory: lungs CTAB.  Cardiovascular: regular rhythm, normal S1 and S2, no peripheral edema  GI: soft, NT/ND, no masses/HSM appreciated.  Neurology: no tremors, No focal neurological deficits    LABS:                        11.7   13.49 )-----------( 166      ( 23 Oct 2019 15:13 )             37.9     10-23    138  |  103  |  17  ----------------------------<  259<H>  4.9   |  25  |  0.94    Ca    9.2      23 Oct 2019 15:13              HbA1C: 7.5 % (10-22 @ 07:00)    CAPILLARY BLOOD GLUCOSE      POCT Blood Glucose.: 208 mg/dL (25 Oct 2019 06:50)  POCT Blood Glucose.: 291 mg/dL (24 Oct 2019 21:59)  POCT Blood Glucose.: 244 mg/dL (24 Oct 2019 17:25)      Insulin Sliding Scale requirements X 24 Hours:    RADIOLOGY & ADDITIONAL TESTS:    A/P: 64y Male with history of DM type II presenting for       1.  DM -     Please continue           units lantus at bedtime  / in the morning and        units lispro with meals and lispro moderate / low dose sliding scale 4 times daily with meals and at bedtime.  Please continue consistent carbohydrate diet.      Goal FSG is   Will continue to monitor   For discharge, pt can continue    Pt can follow up at discharge with [facility] by calling  to make an appointment.   Will discuss case with     and update primary team INTERVAL HPI/OVERNIGHT EVENTS:    Patient seen and examined at the bedside. No acute events overnight. He is currently on insulin pump. His CGM sensor was off and he is going to get it set up. He is being planned for discharge today. he was able to get therapy services. His ECHO showed 30% with moderate to severe global dysfunction and severe pulmonary hypertension.    FSG & Insulin received:  Yesterday:  pre-dinner fs, 5 units bolus via the insulin pump, had shrimp, potato salad, pudding, banana  bedtime fs  Today:  pre-breakfast fs, 5 units bolus, had 2 [place] toast and chocolate pudding  1100AM fs, 5 units bolus , had grilled cheese sandwich, tomatoes, potato salad, banana    Pt reports the following symptoms:    CONSTITUTIONAL:  Negative fever or chills, feels well, good appetite  EYES:  Negative  blurry vision or double vision  CARDIOVASCULAR:  Negative for chest pain or palpitations  RESPIRATORY:  Negative for cough, wheezing, or SOB   GASTROINTESTINAL:  Negative for nausea, vomiting, diarrhea, constipation, or abdominal pain  GENITOURINARY:  Negative frequency, urgency or dysuria  NEUROLOGIC:  No headache, confusion, dizziness, lightheadedness    MEDICATIONS  (STANDING):  acetaminophen   Tablet .. 650 milliGRAM(s) Oral every 6 hours  aspirin enteric coated 81 milliGRAM(s) Oral daily  atorvastatin 80 milliGRAM(s) Oral at bedtime  BUpivacaine liposome 1.3% Injectable (no eMAR) 20 milliLiter(s) Local Injection once  cefpodoxime 200 milliGRAM(s) Oral every 12 hours  celecoxib 100 milliGRAM(s) Oral two times a day  dextrose 10% Bolus 125 milliLiter(s) IV Bolus once  dextrose 10% Bolus 250 milliLiter(s) IV Bolus once  dextrose 5%. 1000 milliLiter(s) (50 mL/Hr) IV Continuous <Continuous>  dextrose 50% Injectable 12.5 Gram(s) IV Push once  dextrose 50% Injectable 25 Gram(s) IV Push once  dextrose 50% Injectable 25 Gram(s) IV Push once  DULoxetine 90 milliGRAM(s) Oral daily  insulin aspart (NovoLOG) Pump 1 Each SubCutaneous Continuous Pump  metoprolol succinate  milliGRAM(s) Oral <User Schedule>  omega-3-Acid Ethyl Esters 4 Gram(s) Oral daily  pantoprazole    Tablet 40 milliGRAM(s) Oral before breakfast  polyethylene glycol 3350 17 Gram(s) Oral daily  prasugrel 10 milliGRAM(s) Oral daily  saccharomyces boulardii 250 milliGRAM(s) Oral two times a day    MEDICATIONS  (PRN):  aluminum hydroxide/magnesium hydroxide/simethicone Suspension 30 milliLiter(s) Oral four times a day PRN Indigestion  bisacodyl Suppository 10 milliGRAM(s) Rectal daily PRN If no bowel movement by postoperative day #2  dextrose 40% Gel 15 Gram(s) Oral once PRN Blood Glucose LESS THAN 70 milliGRAM(s)/deciliter  glucagon  Injectable 1 milliGRAM(s) IntraMuscular once PRN Glucose LESS THAN 70 milligrams/deciliter  HYDROmorphone  Injectable 0.5 milliGRAM(s) IV Push every 4 hours PRN Breakthrough Pain  HYDROmorphone  Injectable 0.5 milliGRAM(s) IV Push every 30 minutes PRN Breakthrough Pain  morphine  - Injectable 2 milliGRAM(s) IV Push every 4 hours PRN Severe Pain (7 - 10)  ondansetron Injectable 4 milliGRAM(s) IV Push every 6 hours PRN Nausea and/or Vomiting  oxyCODONE    IR 5 milliGRAM(s) Oral every 4 hours PRN Mild Pain (1 - 3)  oxyCODONE    IR 10 milliGRAM(s) Oral every 4 hours PRN Moderate Pain (4 - 6)  senna 2 Tablet(s) Oral at bedtime PRN Constipation      PHYSICAL EXAM  Vital Signs Last 24 Hrs  T(C): 36.6 (25 Oct 2019 05:04), Max: 37.4 (24 Oct 2019 16:00)  T(F): 97.8 (25 Oct 2019 05:04), Max: 99.4 (24 Oct 2019 16:00)  HR: 74 (25 Oct 2019 05:04) (74 - 102)  BP: 111/68 (25 Oct 2019 05:04) (111/68 - 143/81)  BP(mean): --  RR: 17 (25 Oct 2019 05:04) (17 - 18)  SpO2: 97% (25 Oct 2019 05:04) (94% - 97%)    Constitutional: wn/wd in NAD.   Respiratory: lungs CTAB.  Cardiovascular: regular rhythm, normal S1 and S2, no peripheral edema  GI: soft, NT/ND, no masses/HSM appreciated.  Neurology: no tremors, No focal neurological deficits    LABS:                        11.7   13.49 )-----------( 166      ( 23 Oct 2019 15:13 )             37.9     10-23    138  |  103  |  17  ----------------------------<  259<H>  4.9   |  25  |  0.94    Ca    9.2      23 Oct 2019 15:13              HbA1C: 7.5 % (10-22 @ 07:00)    CAPILLARY BLOOD GLUCOSE      POCT Blood Glucose.: 208 mg/dL (25 Oct 2019 06:50)  POCT Blood Glucose.: 291 mg/dL (24 Oct 2019 21:59)  POCT Blood Glucose.: 244 mg/dL (24 Oct 2019 17:25)      Insulin Sliding Scale requirements X 24 Hours:    RADIOLOGY & ADDITIONAL TESTS:    A/P: Patient is 65 y/o maleDM, CVT, CHF, HLD, CAD  who presents with right knee OA s/p right total knee arthroplasty    1.  DM Type 2 - controlled - with neuropathy and retinopathy  Hba1c 7.5  Cr/GFR 0.97/97  Echo showed EF 30 to 35%  Wt 106.6 kg with BMI 31  Patient can continue to be on insulin pump - Patient signed the insulin pump policy.  We increased the basal rate to 3.5 units/hr from Midnight to 700 AM and  to 4 units/hr from 700 AM till midnight given his morning FSG being high today. These changes were made in the insulin pump.   PLease check FSG AC and HS  Will continue to monitor     For discharge, pt can be discharged back on insulin pump with the current settings as mentioned above, Jardiance 10mg once daily and trulicity 1.5mg/once a week    Pt can follow up at discharge with , [facility] by calling  to make an appointment.   Discussed case with

## 2019-10-25 NOTE — PROGRESS NOTE ADULT - SUBJECTIVE AND OBJECTIVE BOX
Ortho Note    Pt comfortable without complaints, pain controlled  Denies CP, SOB, N/V, numbness/tingling     Vital Signs Last 24 Hrs  T(C): 36.3 (25 Oct 2019 08:15), Max: 37.4 (24 Oct 2019 16:00)  T(F): 97.4 (25 Oct 2019 08:15), Max: 99.4 (24 Oct 2019 16:00)  HR: 82 (25 Oct 2019 08:15) (74 - 102)  BP: 121/60 (25 Oct 2019 11:01) (111/68 - 143/81)  BP(mean): --  RR: 17 (25 Oct 2019 08:15) (17 - 18)  SpO2: 96% (25 Oct 2019 08:15) (94% - 97%)      AVSS  General: Pt Alert and oriented, NAD  RLE: Aquacell DSG C/D/I; Pt w bone foam under heel to promote terminal knee ext  Pulses: WWP; Palpable DP pulse; Cap refill < 2 sec  Sensation: Sensation mildly decreased over b/l plantar surfaces consistent with baseline as per patient; Otherwise SILT and symmetric  Motor: EHL/FHL/TA/GS 5/5 and symmetric to contralateral extremity

## 2019-10-25 NOTE — PROGRESS NOTE ADULT - REASON FOR ADMISSION
right knee pain

## 2019-10-25 NOTE — PROGRESS NOTE ADULT - ASSESSMENT
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 Nuno on 6/1/18.  He is today s/p right TKR.    1) CAD - stable    - continue with asa, statin, b blocker  - restart effient as per ortho  2) DM - Appreciate Endocrine consultation   - follow up recs  - hold oral meds from home  - check FS's  - diabetic diet  3) HTN-Remains labile   - limit opioids  - encourage fluids  4) Anemia- stable  5) DVT ppx with asa bid dosing
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 Nuno on 6/1/18.  He is today s/p right TKR.    1) CAD - stable, appreciate eval by Dr Caldera   - continue with asa, effient statin, b blocker  - echo with sev pulm htn, ef 30%  2) DM - Appreciate Endocrine consultation   - follow up recs  - hold oral meds from home  - check FS's  - diabetic diet  3) HTN-more stable   - limit opioids  4) Anemia- stable  5) DVT ppx with asa bid dosing
A/P: 64yMale s/p R TKA  - Stable  - Pain Control  - DVT ppx: ASA 81 BID  - Post op abx: Ancef 2g Q8H x 2 doses  - WBS: WBAT RLE  - Will continue to monitor vascular status  - PT: pending PT eval    Ortho Pager [phone]
A/P: 64yMale s/p R TKA  - Stable  - Pain/Nausea Control -- No toradol  - Home meds  - AM labs unremarkable  - DVT ppx: ASA 81 BID -- Holding home dose Effient while inpatient  - WBS: WBAT RLE  - PT: pending eval      Ortho Pager [phone]
A/P: 64yMale s/p R TKA  - Stable  - Pain/Nausea Control -- No toradol, half dose Celebrex (100mg BID)  - Home meds  - DVT ppx: ASA 81 BID -- Restarting home dose Effient as well  - WBS: WBAT RLE  - PT: home w HPT  - Plan to d/c when clears PT - likely today      Ortho Pager [phone]
A/P: 64yMale s/p R TKA  - Stable -- Orthostatic w PT again this AM -- symptoms improved following administration of 1L IVF @ 60cc/hr  - Pain/Nausea Control -- No toradol  - Home meds  - AM labs unremarkable  - DVT ppx: ASA 81 BID -- Holding home dose Effient while inpatient  - WBS: WBAT RLE  - PT: home w HPT  - Plan to d/c when clears PT - likely tmrw      Ortho Pager [phone]
A/P: 64yMale s/p R TKA  - Stable -- Orthostatic w PT again yesterday AM -- symptoms improved following administration of 1L IVF @ 60cc/hr  - Pain/Nausea Control -- No toradol  - Home meds  - DVT ppx: ASA 81 BID -- Holding home dose Effient while inpatient  - WBS: WBAT RLE  - PT: home w HPT  - Plan to d/c when clears PT - likely today      Ortho Pager [phone]
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 Nuno on 6/1/18.  He is today s/p right TKR.    1) CAD - stable    - continue with asa, statin, b blocker  - restart effient as per ortho  2) DM - Appreciate Endocrine consultation   - follow up recs  - hold oral meds from home  - check FS's  - diabetic diet  3) HTN- labile - encourage fluids this AM, monitor closely with PT session  4) Anemia- Hb 12.9- stable  5) DVT ppx with asa bid dosing

## 2019-10-29 DIAGNOSIS — E66.9 OBESITY, UNSPECIFIED: ICD-10-CM

## 2019-10-29 DIAGNOSIS — N18.2 CHRONIC KIDNEY DISEASE, STAGE 2 (MILD): ICD-10-CM

## 2019-10-29 DIAGNOSIS — I95.1 ORTHOSTATIC HYPOTENSION: ICD-10-CM

## 2019-10-29 DIAGNOSIS — Z95.5 PRESENCE OF CORONARY ANGIOPLASTY IMPLANT AND GRAFT: ICD-10-CM

## 2019-10-29 DIAGNOSIS — Z96.41 PRESENCE OF INSULIN PUMP (EXTERNAL) (INTERNAL): ICD-10-CM

## 2019-10-29 DIAGNOSIS — Z79.82 LONG TERM (CURRENT) USE OF ASPIRIN: ICD-10-CM

## 2019-10-29 DIAGNOSIS — I13.0 HYPERTENSIVE HEART AND CHRONIC KIDNEY DISEASE WITH HEART FAILURE AND STAGE 1 THROUGH STAGE 4 CHRONIC KIDNEY DISEASE, OR UNSPECIFIED CHRONIC KIDNEY DISEASE: ICD-10-CM

## 2019-10-29 DIAGNOSIS — Z79.4 LONG TERM (CURRENT) USE OF INSULIN: ICD-10-CM

## 2019-10-29 DIAGNOSIS — I49.8 OTHER SPECIFIED CARDIAC ARRHYTHMIAS: ICD-10-CM

## 2019-10-29 DIAGNOSIS — Z95.1 PRESENCE OF AORTOCORONARY BYPASS GRAFT: ICD-10-CM

## 2019-10-29 DIAGNOSIS — E11.51 TYPE 2 DIABETES MELLITUS WITH DIABETIC PERIPHERAL ANGIOPATHY WITHOUT GANGRENE: ICD-10-CM

## 2019-10-29 DIAGNOSIS — Z82.49 FAMILY HISTORY OF ISCHEMIC HEART DISEASE AND OTHER DISEASES OF THE CIRCULATORY SYSTEM: ICD-10-CM

## 2019-10-29 DIAGNOSIS — M17.11 UNILATERAL PRIMARY OSTEOARTHRITIS, RIGHT KNEE: ICD-10-CM

## 2019-10-29 DIAGNOSIS — M21.161 VARUS DEFORMITY, NOT ELSEWHERE CLASSIFIED, RIGHT KNEE: ICD-10-CM

## 2019-10-29 DIAGNOSIS — Z88.8 ALLERGY STATUS TO OTHER DRUGS, MEDICAMENTS AND BIOLOGICAL SUBSTANCES STATUS: ICD-10-CM

## 2019-10-29 DIAGNOSIS — E78.5 HYPERLIPIDEMIA, UNSPECIFIED: ICD-10-CM

## 2019-10-29 DIAGNOSIS — I25.2 OLD MYOCARDIAL INFARCTION: ICD-10-CM

## 2019-10-29 DIAGNOSIS — I50.22 CHRONIC SYSTOLIC (CONGESTIVE) HEART FAILURE: ICD-10-CM

## 2019-10-29 DIAGNOSIS — K21.9 GASTRO-ESOPHAGEAL REFLUX DISEASE WITHOUT ESOPHAGITIS: ICD-10-CM

## 2019-10-29 DIAGNOSIS — E11.40 TYPE 2 DIABETES MELLITUS WITH DIABETIC NEUROPATHY, UNSPECIFIED: ICD-10-CM

## 2019-10-29 DIAGNOSIS — E11.22 TYPE 2 DIABETES MELLITUS WITH DIABETIC CHRONIC KIDNEY DISEASE: ICD-10-CM

## 2019-10-29 DIAGNOSIS — I25.10 ATHEROSCLEROTIC HEART DISEASE OF NATIVE CORONARY ARTERY WITHOUT ANGINA PECTORIS: ICD-10-CM

## 2019-10-29 DIAGNOSIS — D64.9 ANEMIA, UNSPECIFIED: ICD-10-CM

## 2019-10-29 DIAGNOSIS — M24.561 CONTRACTURE, RIGHT KNEE: ICD-10-CM

## 2019-10-29 DIAGNOSIS — R19.7 DIARRHEA, UNSPECIFIED: ICD-10-CM

## 2019-10-29 DIAGNOSIS — E11.319 TYPE 2 DIABETES MELLITUS WITH UNSPECIFIED DIABETIC RETINOPATHY WITHOUT MACULAR EDEMA: ICD-10-CM

## 2019-11-04 DIAGNOSIS — R19.7 INTESTINAL MALABSORPTION, UNSPECIFIED: ICD-10-CM

## 2019-11-04 DIAGNOSIS — K90.9 INTESTINAL MALABSORPTION, UNSPECIFIED: ICD-10-CM

## 2019-11-04 DIAGNOSIS — R19.7 DIARRHEA, UNSPECIFIED: ICD-10-CM

## 2019-11-05 ENCOUNTER — APPOINTMENT (OUTPATIENT)
Dept: ORTHOPEDIC SURGERY | Facility: CLINIC | Age: 64
End: 2019-11-05

## 2019-11-05 PROCEDURE — S2900: CPT

## 2019-11-05 PROCEDURE — 97116 GAIT TRAINING THERAPY: CPT

## 2019-11-05 PROCEDURE — 36415 COLL VENOUS BLD VENIPUNCTURE: CPT

## 2019-11-05 PROCEDURE — 97530 THERAPEUTIC ACTIVITIES: CPT

## 2019-11-05 PROCEDURE — 80048 BASIC METABOLIC PNL TOTAL CA: CPT

## 2019-11-05 PROCEDURE — 86803 HEPATITIS C AB TEST: CPT

## 2019-11-05 PROCEDURE — C1713: CPT

## 2019-11-05 PROCEDURE — 85027 COMPLETE CBC AUTOMATED: CPT

## 2019-11-05 PROCEDURE — 83036 HEMOGLOBIN GLYCOSYLATED A1C: CPT

## 2019-11-05 PROCEDURE — 97161 PT EVAL LOW COMPLEX 20 MIN: CPT

## 2019-11-05 PROCEDURE — C1776: CPT

## 2019-11-05 PROCEDURE — 82962 GLUCOSE BLOOD TEST: CPT

## 2019-11-05 PROCEDURE — C8929: CPT

## 2019-11-05 PROCEDURE — 73560 X-RAY EXAM OF KNEE 1 OR 2: CPT

## 2019-11-06 ENCOUNTER — OUTPATIENT (OUTPATIENT)
Dept: OUTPATIENT SERVICES | Facility: HOSPITAL | Age: 64
LOS: 1 days | Discharge: HOME | End: 2019-11-06

## 2019-11-06 DIAGNOSIS — Z95.5 PRESENCE OF CORONARY ANGIOPLASTY IMPLANT AND GRAFT: Chronic | ICD-10-CM

## 2019-11-06 DIAGNOSIS — R19.7 DIARRHEA, UNSPECIFIED: ICD-10-CM

## 2019-11-06 DIAGNOSIS — Z95.1 PRESENCE OF AORTOCORONARY BYPASS GRAFT: Chronic | ICD-10-CM

## 2019-11-07 LAB — GI PCR PANEL, STOOL: NORMAL

## 2019-11-08 LAB
C DIFF TOX GENS STL QL NAA+PROBE: NORMAL
CDIFF BY PCR: NOT DETECTED

## 2019-11-14 ENCOUNTER — OUTPATIENT (OUTPATIENT)
Dept: OUTPATIENT SERVICES | Facility: HOSPITAL | Age: 64
LOS: 1 days | Discharge: HOME | End: 2019-11-14

## 2019-11-14 ENCOUNTER — RX RENEWAL (OUTPATIENT)
Age: 64
End: 2019-11-14

## 2019-11-14 DIAGNOSIS — Z95.5 PRESENCE OF CORONARY ANGIOPLASTY IMPLANT AND GRAFT: Chronic | ICD-10-CM

## 2019-11-14 DIAGNOSIS — Z47.1 AFTERCARE FOLLOWING JOINT REPLACEMENT SURGERY: ICD-10-CM

## 2019-11-14 DIAGNOSIS — Z95.1 PRESENCE OF AORTOCORONARY BYPASS GRAFT: Chronic | ICD-10-CM

## 2019-11-19 ENCOUNTER — APPOINTMENT (OUTPATIENT)
Dept: ORTHOPEDIC SURGERY | Facility: CLINIC | Age: 64
End: 2019-11-19
Payer: COMMERCIAL

## 2019-11-19 DIAGNOSIS — T81.41XA INFECTION FOLLOWING A PROCEDURE,SUPERFICIAL INCISIONAL SURGI SITE,INITIAL ENC: ICD-10-CM

## 2019-11-19 LAB — DEPRECATED O AND P PREP STL: NORMAL

## 2019-11-19 PROCEDURE — 99024 POSTOP FOLLOW-UP VISIT: CPT

## 2019-11-20 ENCOUNTER — OUTPATIENT (OUTPATIENT)
Dept: OUTPATIENT SERVICES | Facility: HOSPITAL | Age: 64
LOS: 1 days | End: 2019-11-20
Payer: COMMERCIAL

## 2019-11-20 DIAGNOSIS — M25.461 EFFUSION, RIGHT KNEE: ICD-10-CM

## 2019-11-20 DIAGNOSIS — Z95.1 PRESENCE OF AORTOCORONARY BYPASS GRAFT: Chronic | ICD-10-CM

## 2019-11-20 DIAGNOSIS — Z95.5 PRESENCE OF CORONARY ANGIOPLASTY IMPLANT AND GRAFT: Chronic | ICD-10-CM

## 2019-11-20 PROCEDURE — 87070 CULTURE OTHR SPECIMN AEROBIC: CPT

## 2019-11-20 PROCEDURE — 87205 SMEAR GRAM STAIN: CPT

## 2019-11-20 PROCEDURE — 89060 EXAM SYNOVIAL FLUID CRYSTALS: CPT

## 2019-11-20 PROCEDURE — 89051 BODY FLUID CELL COUNT: CPT

## 2019-11-20 PROCEDURE — 87075 CULTR BACTERIA EXCEPT BLOOD: CPT

## 2019-11-21 NOTE — END OF VISIT
[FreeTextEntry3] : All medical record entries made by Shahrzad Urban acting as a scribe for the performing provider (Santos Estes MD and/or PACHECO Iraheta) on 11/19/2019. All entries were dictated to me by the performing medical provider. In signing this record, the medical provider affirms that they have personally performed the history, physical exam, assessment and plan and have also directed, reviewed and agreed to the documentation in the chart.

## 2019-11-21 NOTE — PROCEDURE
[Aspiration] : Aspiration [Right] : of the right [Knee Joint] : knee joint [Diagnostic] : Diagnostic [Patient] : patient [Risk] : risk [Benefits] : benefits [Alternatives] : alternatives [Bleeding] : bleeding [Infection] : infection [Allergic Reaction] : allergic reaction [Verbal Consent Obtained] : verbal consent was obtained prior to the procedure [Alcohol] : Alcohol [Betadine] : Betadine [Ethyl Chloride Spray] : ethyl chloride spray was used as a topical anesthetic [Lateral] : lateral [18] : an 18-gauge [1.5  inch] : 1.5 inch needle [___ mL Fluid] : [unfilled] mL of [Same Needle/New Syringe] : the syringe was changed and the same needle was left in place and [Bandage Applied] : a bandage [Tolerated Well] : The patient tolerated the procedure well [None] : none [No Strenuous Activity___day(s)] : avoid strenuous activity for [unfilled] day(s) [PRN] : as needed [de-identified] : blood-tinged

## 2019-11-21 NOTE — HISTORY OF PRESENT ILLNESS
[Chills] : no chills [Fever] : no fever [de-identified] : First post op s/p right total knee replacement, [place], surgical date 10/21/19 [de-identified] : 64 year old male presents for first post op. He reports that his incision was looking good but  after a physical therapy session, he noticed increased redness and swelling about the incision as well as some scabbing that fell of the incision. He reports moderate, occasional right knee pain and mild stiffness. Patient can walk less than 5 blocks with a cane. He uses a rail to ascend and descend stairs.\par Of note, patient reports that he was recently diagnosed with celiac disease and he has changed his diet.  [de-identified] : Patient is alert and oriented x3.\par Right knee: Multiple areas of superficial dehiscence measuring 1-2 mm in width and 1 mm in depth along the incision, with focal nisa-incisional erythema that improves with elevation, no large effusion, no unusual warmth or erythema away from the margin of the incision. No instability. ROM from a <5 degree flexion contracture to 95 degrees of flexion. \par Calf is soft and nontender.\par NVI. [de-identified] : No new imaging was reviewed at this time. [de-identified] : Mr. Blas presents with swelling and erythema around his incision about one month s/p right TKR. I aspirated about 9 cc of clear, blood-tinged fluid from the right knee which was tolerated well. I sent the fluid to the lab for analysis. I also ordered blood work labs to check for infection. I told patient that he should immobilize his right leg for at least two weeks to let the tissues around his knee heal. I provided him with a knee immobilizer brace and showed him how to put it on. I told him to avoid physical therapy, exercise, and any stress on the incision. I advised him to gently wash the incision daily and then to apply Silvadene. He can cover the incision with gauze and an ACE bandage before reapplying the knee immobilizer brace. I prescribed an oral antibiotic. I advised patient to send photos of his incision to my office through the [employer] [facility] in 2-3 days.\par Follow up for wound check with my PA next week and then with myself the following week.

## 2019-11-22 ENCOUNTER — APPOINTMENT (OUTPATIENT)
Dept: ORTHOPEDIC SURGERY | Facility: CLINIC | Age: 64
End: 2019-11-22
Payer: COMMERCIAL

## 2019-11-22 ENCOUNTER — INPATIENT (INPATIENT)
Facility: HOSPITAL | Age: 64
LOS: 4 days | Discharge: HOME HEALTH | DRG: 464 | End: 2019-11-27
Attending: ORTHOPAEDIC SURGERY | Admitting: ORTHOPAEDIC SURGERY
Payer: COMMERCIAL

## 2019-11-22 ENCOUNTER — RESULT REVIEW (OUTPATIENT)
Age: 64
End: 2019-11-22

## 2019-11-22 VITALS — DIASTOLIC BLOOD PRESSURE: 60 MMHG | SYSTOLIC BLOOD PRESSURE: 110 MMHG | HEART RATE: 66 BPM | OXYGEN SATURATION: 98 %

## 2019-11-22 VITALS
SYSTOLIC BLOOD PRESSURE: 96 MMHG | OXYGEN SATURATION: 98 % | RESPIRATION RATE: 16 BRPM | TEMPERATURE: 98 F | HEART RATE: 67 BPM | DIASTOLIC BLOOD PRESSURE: 51 MMHG

## 2019-11-22 DIAGNOSIS — I50.22 CHRONIC SYSTOLIC (CONGESTIVE) HEART FAILURE: ICD-10-CM

## 2019-11-22 DIAGNOSIS — I25.10 ATHEROSCLEROTIC HEART DISEASE OF NATIVE CORONARY ARTERY WITHOUT ANGINA PECTORIS: ICD-10-CM

## 2019-11-22 DIAGNOSIS — T85.818A EMBOLISM DUE TO OTHER INTERNAL PROSTHETIC DEVICES, IMPLANTS AND GRAFTS, INITIAL ENCOUNTER: ICD-10-CM

## 2019-11-22 DIAGNOSIS — Z95.5 PRESENCE OF CORONARY ANGIOPLASTY IMPLANT AND GRAFT: Chronic | ICD-10-CM

## 2019-11-22 DIAGNOSIS — T81.41XA INFECTION FOLLOWING A PROCEDURE, SUPERFICIAL INCISIONAL SURGICAL SITE, INITIAL ENCOUNTER: ICD-10-CM

## 2019-11-22 DIAGNOSIS — T81.32XA DISRUPTION OF INTERNAL OPERATION (SURGICAL) WOUND, NOT ELSEWHERE CLASSIFIED, INITIAL ENCOUNTER: ICD-10-CM

## 2019-11-22 DIAGNOSIS — E11.9 TYPE 2 DIABETES MELLITUS WITHOUT COMPLICATIONS: ICD-10-CM

## 2019-11-22 DIAGNOSIS — Z95.1 PRESENCE OF AORTOCORONARY BYPASS GRAFT: Chronic | ICD-10-CM

## 2019-11-22 DIAGNOSIS — Z98.61 CORONARY ANGIOPLASTY STATUS: ICD-10-CM

## 2019-11-22 DIAGNOSIS — E78.5 HYPERLIPIDEMIA, UNSPECIFIED: ICD-10-CM

## 2019-11-22 LAB
ALBUMIN SERPL ELPH-MCNC: 4 G/DL — SIGNIFICANT CHANGE UP (ref 3.3–5)
ALP SERPL-CCNC: 93 U/L — SIGNIFICANT CHANGE UP (ref 40–120)
ALT FLD-CCNC: 17 U/L — SIGNIFICANT CHANGE UP (ref 10–45)
ANION GAP SERPL CALC-SCNC: 10 MMOL/L — SIGNIFICANT CHANGE UP (ref 5–17)
APTT BLD: 27 SEC — LOW (ref 27.5–36.3)
AST SERPL-CCNC: 22 U/L — SIGNIFICANT CHANGE UP (ref 10–40)
BASOPHILS # BLD AUTO: 0.05 K/UL — SIGNIFICANT CHANGE UP (ref 0–0.2)
BASOPHILS NFR BLD AUTO: 0.6 % — SIGNIFICANT CHANGE UP (ref 0–2)
BILIRUB SERPL-MCNC: 1.4 MG/DL — HIGH (ref 0.2–1.2)
BLD GP AB SCN SERPL QL: NEGATIVE — SIGNIFICANT CHANGE UP
BUN SERPL-MCNC: 26 MG/DL — HIGH (ref 7–23)
CALCIUM SERPL-MCNC: 9.2 MG/DL — SIGNIFICANT CHANGE UP (ref 8.4–10.5)
CHLORIDE SERPL-SCNC: 102 MMOL/L — SIGNIFICANT CHANGE UP (ref 96–108)
CO2 SERPL-SCNC: 27 MMOL/L — SIGNIFICANT CHANGE UP (ref 22–31)
CREAT SERPL-MCNC: 1.45 MG/DL — HIGH (ref 0.5–1.3)
EOSINOPHIL # BLD AUTO: 0.13 K/UL — SIGNIFICANT CHANGE UP (ref 0–0.5)
EOSINOPHIL NFR BLD AUTO: 1.6 % — SIGNIFICANT CHANGE UP (ref 0–6)
GLUCOSE BLDC GLUCOMTR-MCNC: 120 MG/DL — HIGH (ref 70–99)
GLUCOSE BLDC GLUCOMTR-MCNC: 141 MG/DL — HIGH (ref 70–99)
GLUCOSE BLDC GLUCOMTR-MCNC: 160 MG/DL — HIGH (ref 70–99)
GLUCOSE SERPL-MCNC: 238 MG/DL — HIGH (ref 70–99)
GRAM STN FLD: SIGNIFICANT CHANGE UP
HCT VFR BLD CALC: 37 % — LOW (ref 39–50)
HGB BLD-MCNC: 11 G/DL — LOW (ref 13–17)
IMM GRANULOCYTES NFR BLD AUTO: 0.4 % — SIGNIFICANT CHANGE UP (ref 0–1.5)
INR BLD: 1.41 — HIGH (ref 0.88–1.16)
LYMPHOCYTES # BLD AUTO: 0.6 K/UL — LOW (ref 1–3.3)
LYMPHOCYTES # BLD AUTO: 7.3 % — LOW (ref 13–44)
MCHC RBC-ENTMCNC: 25.6 PG — LOW (ref 27–34)
MCHC RBC-ENTMCNC: 29.7 GM/DL — LOW (ref 32–36)
MCV RBC AUTO: 86 FL — SIGNIFICANT CHANGE UP (ref 80–100)
MONOCYTES # BLD AUTO: 0.72 K/UL — SIGNIFICANT CHANGE UP (ref 0–0.9)
MONOCYTES NFR BLD AUTO: 8.8 % — SIGNIFICANT CHANGE UP (ref 2–14)
NEUTROPHILS # BLD AUTO: 6.69 K/UL — SIGNIFICANT CHANGE UP (ref 1.8–7.4)
NEUTROPHILS NFR BLD AUTO: 81.3 % — HIGH (ref 43–77)
NRBC # BLD: 0 /100 WBCS — SIGNIFICANT CHANGE UP (ref 0–0)
PLATELET # BLD AUTO: 285 K/UL — SIGNIFICANT CHANGE UP (ref 150–400)
POTASSIUM SERPL-MCNC: 5 MMOL/L — SIGNIFICANT CHANGE UP (ref 3.5–5.3)
POTASSIUM SERPL-SCNC: 5 MMOL/L — SIGNIFICANT CHANGE UP (ref 3.5–5.3)
PROT SERPL-MCNC: 7.1 G/DL — SIGNIFICANT CHANGE UP (ref 6–8.3)
PROTHROM AB SERPL-ACNC: 16.1 SEC — HIGH (ref 10–12.9)
RBC # BLD: 4.3 M/UL — SIGNIFICANT CHANGE UP (ref 4.2–5.8)
RBC # FLD: 19.1 % — HIGH (ref 10.3–14.5)
RH IG SCN BLD-IMP: POSITIVE — SIGNIFICANT CHANGE UP
SODIUM SERPL-SCNC: 139 MMOL/L — SIGNIFICANT CHANGE UP (ref 135–145)
SPECIMEN SOURCE: SIGNIFICANT CHANGE UP
WBC # BLD: 8.22 K/UL — SIGNIFICANT CHANGE UP (ref 3.8–10.5)
WBC # FLD AUTO: 8.22 K/UL — SIGNIFICANT CHANGE UP (ref 3.8–10.5)

## 2019-11-22 PROCEDURE — 99024 POSTOP FOLLOW-UP VISIT: CPT

## 2019-11-22 PROCEDURE — 73560 X-RAY EXAM OF KNEE 1 OR 2: CPT | Mod: 26,RT

## 2019-11-22 PROCEDURE — 27486 REVISE/REPLACE KNEE JOINT: CPT | Mod: 52,78,RT

## 2019-11-22 PROCEDURE — 99222 1ST HOSP IP/OBS MODERATE 55: CPT

## 2019-11-22 PROCEDURE — 93010 ELECTROCARDIOGRAM REPORT: CPT

## 2019-11-22 PROCEDURE — 97607 NEG PRS WND THR NDME<=50SQCM: CPT

## 2019-11-22 PROCEDURE — 88305 TISSUE EXAM BY PATHOLOGIST: CPT | Mod: 26

## 2019-11-22 PROCEDURE — 99285 EMERGENCY DEPT VISIT HI MDM: CPT

## 2019-11-22 PROCEDURE — 10180 I&D COMPLEX PO WOUND INFCTJ: CPT | Mod: 78,RT

## 2019-11-22 RX ORDER — INSULIN GLARGINE 100 [IU]/ML
35 INJECTION, SOLUTION SUBCUTANEOUS AT BEDTIME
Refills: 0 | Status: DISCONTINUED | OUTPATIENT
Start: 2019-11-22 | End: 2019-11-23

## 2019-11-22 RX ORDER — ATORVASTATIN CALCIUM 80 MG/1
40 TABLET, FILM COATED ORAL AT BEDTIME
Refills: 0 | Status: DISCONTINUED | OUTPATIENT
Start: 2019-11-22 | End: 2019-11-27

## 2019-11-22 RX ORDER — ONDANSETRON 8 MG/1
4 TABLET, FILM COATED ORAL EVERY 6 HOURS
Refills: 0 | Status: DISCONTINUED | OUTPATIENT
Start: 2019-11-22 | End: 2019-11-27

## 2019-11-22 RX ORDER — INSULIN LISPRO 100/ML
VIAL (ML) SUBCUTANEOUS
Refills: 0 | Status: DISCONTINUED | OUTPATIENT
Start: 2019-11-22 | End: 2019-11-27

## 2019-11-22 RX ORDER — METOCLOPRAMIDE HCL 10 MG
10 TABLET ORAL EVERY 6 HOURS
Refills: 0 | Status: DISCONTINUED | OUTPATIENT
Start: 2019-11-22 | End: 2019-11-27

## 2019-11-22 RX ORDER — SODIUM CHLORIDE 9 MG/ML
1000 INJECTION, SOLUTION INTRAVENOUS
Refills: 0 | Status: DISCONTINUED | OUTPATIENT
Start: 2019-11-22 | End: 2019-11-27

## 2019-11-22 RX ORDER — DEXTROSE 50 % IN WATER 50 %
25 SYRINGE (ML) INTRAVENOUS ONCE
Refills: 0 | Status: DISCONTINUED | OUTPATIENT
Start: 2019-11-22 | End: 2019-11-27

## 2019-11-22 RX ORDER — POLYETHYLENE GLYCOL 3350 17 G/17G
17 POWDER, FOR SOLUTION ORAL DAILY
Refills: 0 | Status: DISCONTINUED | OUTPATIENT
Start: 2019-11-22 | End: 2019-11-27

## 2019-11-22 RX ORDER — OXYCODONE HYDROCHLORIDE 5 MG/1
10 TABLET ORAL EVERY 4 HOURS
Refills: 0 | Status: DISCONTINUED | OUTPATIENT
Start: 2019-11-22 | End: 2019-11-27

## 2019-11-22 RX ORDER — SENNA PLUS 8.6 MG/1
2 TABLET ORAL AT BEDTIME
Refills: 0 | Status: DISCONTINUED | OUTPATIENT
Start: 2019-11-22 | End: 2019-11-22

## 2019-11-22 RX ORDER — HYDROMORPHONE HYDROCHLORIDE 2 MG/ML
0.5 INJECTION INTRAMUSCULAR; INTRAVENOUS; SUBCUTANEOUS EVERY 4 HOURS
Refills: 0 | Status: DISCONTINUED | OUTPATIENT
Start: 2019-11-22 | End: 2019-11-27

## 2019-11-22 RX ORDER — ACETAMINOPHEN 500 MG
650 TABLET ORAL EVERY 6 HOURS
Refills: 0 | Status: DISCONTINUED | OUTPATIENT
Start: 2019-11-22 | End: 2019-11-22

## 2019-11-22 RX ORDER — ACETAMINOPHEN 500 MG
650 TABLET ORAL EVERY 6 HOURS
Refills: 0 | Status: DISCONTINUED | OUTPATIENT
Start: 2019-11-22 | End: 2019-11-27

## 2019-11-22 RX ORDER — DEXTROSE 50 % IN WATER 50 %
12.5 SYRINGE (ML) INTRAVENOUS ONCE
Refills: 0 | Status: DISCONTINUED | OUTPATIENT
Start: 2019-11-22 | End: 2019-11-27

## 2019-11-22 RX ORDER — SULFAMETHOXAZOLE AND TRIMETHOPRIM 800; 160 MG/1; MG/1
800-160 TABLET ORAL TWICE DAILY
Qty: 28 | Refills: 0 | Status: DISCONTINUED | COMMUNITY
Start: 2019-11-19 | End: 2019-11-22

## 2019-11-22 RX ORDER — ASPIRIN/CALCIUM CARB/MAGNESIUM 324 MG
81 TABLET ORAL
Refills: 0 | Status: DISCONTINUED | OUTPATIENT
Start: 2019-11-22 | End: 2019-11-27

## 2019-11-22 RX ORDER — GLUCAGON INJECTION, SOLUTION 0.5 MG/.1ML
1 INJECTION, SOLUTION SUBCUTANEOUS ONCE
Refills: 0 | Status: DISCONTINUED | OUTPATIENT
Start: 2019-11-22 | End: 2019-11-27

## 2019-11-22 RX ORDER — METOPROLOL TARTRATE 50 MG
100 TABLET ORAL DAILY
Refills: 0 | Status: DISCONTINUED | OUTPATIENT
Start: 2019-11-22 | End: 2019-11-27

## 2019-11-22 RX ORDER — SODIUM CHLORIDE 9 MG/ML
1000 INJECTION, SOLUTION INTRAVENOUS
Refills: 0 | Status: DISCONTINUED | OUTPATIENT
Start: 2019-11-22 | End: 2019-11-22

## 2019-11-22 RX ORDER — DEXTROSE 50 % IN WATER 50 %
15 SYRINGE (ML) INTRAVENOUS ONCE
Refills: 0 | Status: DISCONTINUED | OUTPATIENT
Start: 2019-11-22 | End: 2019-11-27

## 2019-11-22 RX ORDER — INSULIN LISPRO 100/ML
VIAL (ML) SUBCUTANEOUS AT BEDTIME
Refills: 0 | Status: DISCONTINUED | OUTPATIENT
Start: 2019-11-22 | End: 2019-11-22

## 2019-11-22 RX ORDER — OXYCODONE HYDROCHLORIDE 5 MG/1
5 TABLET ORAL EVERY 4 HOURS
Refills: 0 | Status: DISCONTINUED | OUTPATIENT
Start: 2019-11-22 | End: 2019-11-27

## 2019-11-22 RX ORDER — HYDROMORPHONE HYDROCHLORIDE 2 MG/ML
0.5 INJECTION INTRAMUSCULAR; INTRAVENOUS; SUBCUTANEOUS
Refills: 0 | Status: DISCONTINUED | OUTPATIENT
Start: 2019-11-22 | End: 2019-11-27

## 2019-11-22 RX ORDER — INSULIN LISPRO 100/ML
VIAL (ML) SUBCUTANEOUS
Refills: 0 | Status: DISCONTINUED | OUTPATIENT
Start: 2019-11-22 | End: 2019-11-22

## 2019-11-22 RX ORDER — PRASUGREL 5 MG/1
10 TABLET, FILM COATED ORAL DAILY
Refills: 0 | Status: DISCONTINUED | OUTPATIENT
Start: 2019-11-22 | End: 2019-11-27

## 2019-11-22 RX ORDER — MAGNESIUM HYDROXIDE 400 MG/1
30 TABLET, CHEWABLE ORAL DAILY
Refills: 0 | Status: DISCONTINUED | OUTPATIENT
Start: 2019-11-22 | End: 2019-11-27

## 2019-11-22 RX ORDER — POLYETHYLENE GLYCOL 3350 17 G/17G
17 POWDER, FOR SOLUTION ORAL DAILY
Refills: 0 | Status: DISCONTINUED | OUTPATIENT
Start: 2019-11-22 | End: 2019-11-22

## 2019-11-22 RX ORDER — BUPIVACAINE 13.3 MG/ML
20 INJECTION, SUSPENSION, LIPOSOMAL INFILTRATION ONCE
Refills: 0 | Status: DISCONTINUED | OUTPATIENT
Start: 2019-11-22 | End: 2019-11-27

## 2019-11-22 RX ORDER — OXYCODONE HYDROCHLORIDE 10 MG/1
10 TABLET, FILM COATED, EXTENDED RELEASE ORAL
Qty: 14 | Refills: 0 | Status: DISCONTINUED | COMMUNITY
Start: 2019-10-21 | End: 2019-11-22

## 2019-11-22 RX ORDER — FERROUS SULFATE 325(65) MG
325 TABLET ORAL
Refills: 0 | Status: DISCONTINUED | OUTPATIENT
Start: 2019-11-22 | End: 2019-11-27

## 2019-11-22 RX ORDER — PANTOPRAZOLE SODIUM 20 MG/1
40 TABLET, DELAYED RELEASE ORAL
Refills: 0 | Status: DISCONTINUED | OUTPATIENT
Start: 2019-11-22 | End: 2019-11-27

## 2019-11-22 RX ORDER — ASCORBIC ACID 60 MG
500 TABLET,CHEWABLE ORAL
Refills: 0 | Status: DISCONTINUED | OUTPATIENT
Start: 2019-11-22 | End: 2019-11-27

## 2019-11-22 RX ORDER — FOLIC ACID 0.8 MG
1 TABLET ORAL DAILY
Refills: 0 | Status: DISCONTINUED | OUTPATIENT
Start: 2019-11-22 | End: 2019-11-27

## 2019-11-22 RX ORDER — VANCOMYCIN HCL 1 G
1000 VIAL (EA) INTRAVENOUS EVERY 12 HOURS
Refills: 0 | Status: DISCONTINUED | OUTPATIENT
Start: 2019-11-22 | End: 2019-11-27

## 2019-11-22 RX ORDER — SENNA PLUS 8.6 MG/1
2 TABLET ORAL AT BEDTIME
Refills: 0 | Status: DISCONTINUED | OUTPATIENT
Start: 2019-11-22 | End: 2019-11-27

## 2019-11-22 RX ADMIN — Medication 500 MILLIGRAM(S): at 23:11

## 2019-11-22 RX ADMIN — SODIUM CHLORIDE 100 MILLILITER(S): 9 INJECTION, SOLUTION INTRAVENOUS at 13:07

## 2019-11-22 RX ADMIN — HYDROMORPHONE HYDROCHLORIDE 0.5 MILLIGRAM(S): 2 INJECTION INTRAMUSCULAR; INTRAVENOUS; SUBCUTANEOUS at 19:15

## 2019-11-22 RX ADMIN — HYDROMORPHONE HYDROCHLORIDE 0.5 MILLIGRAM(S): 2 INJECTION INTRAMUSCULAR; INTRAVENOUS; SUBCUTANEOUS at 19:35

## 2019-11-22 RX ADMIN — Medication 2: at 19:38

## 2019-11-22 RX ADMIN — INSULIN GLARGINE 35 UNIT(S): 100 INJECTION, SOLUTION SUBCUTANEOUS at 23:09

## 2019-11-22 RX ADMIN — OXYCODONE HYDROCHLORIDE 10 MILLIGRAM(S): 5 TABLET ORAL at 23:08

## 2019-11-22 RX ADMIN — Medication 325 MILLIGRAM(S): at 23:07

## 2019-11-22 RX ADMIN — ATORVASTATIN CALCIUM 40 MILLIGRAM(S): 80 TABLET, FILM COATED ORAL at 23:07

## 2019-11-22 NOTE — BRIEF OPERATIVE NOTE - NSICDXBRIEFPROCEDURE_GEN_ALL_CORE_FT
PROCEDURES:  Incision and drainage of right knee with polyethylene liner exchange 22-Nov-2019 19:38:14  Santos Bui  Irrigation and debridement, knee 22-Nov-2019 19:37:59  Santos Bui

## 2019-11-22 NOTE — H&P ADULT - NSICDXPASTSURGICALHX_GEN_ALL_CORE_FT
PAST SURGICAL HISTORY:  Other postprocedural status Fixation hardware in foot LEFT    S/P CABG x 1 2018    Stented coronary artery 10/18 heart attack  INFERIOR WALL MI

## 2019-11-22 NOTE — ED PROVIDER NOTE - OBJECTIVE STATEMENT
63yo man with h/o CAD s/p CABG/CAD, hypercholesterolemia, DM, undwent right TKA on Oct 21st, recently developed redeness and drainage from the surgical incision and was started on abx/bactrim this previous monday. Pt was seen by Dr. Arana this morning and sent to the ED for admission and wash out of right knee. Pt has no c/o F/C, no pain at the knee, no difficulty with movement, no numbness, tingling of the legs, and has felt otherwise well. No CP/SOB/abd pain.

## 2019-11-22 NOTE — ED PROVIDER NOTE - CLINICAL SUMMARY MEDICAL DECISION MAKING FREE TEXT BOX
PT afVSS, with infection to the right TKA, discussed with orthopedist for regional admission and wash out. Labs ordered and will admit at this time.

## 2019-11-22 NOTE — CONSULT NOTE ADULT - ATTENDING COMMENTS
A/P: 64y Male with hx of DM presenting for mangement of knee infection, now off insulin pump.     1.  DM - type 2, controlled, complicated with CAD  Will start basal bolus regimen.   Start 0.7/kg, start 35 units lantus at bedtime, 12 units lispro TID with meals when pt resumes PO intake.   Lispro moderate dose scale with meals and at bedtime.   Continue consistent carb diet, Nutrition consult  dilute all medications in NS instead of D5 when possible.   Ensure correct injection and FSG technique    2.  Hyperlipidemia - LDL goal < 70  check lipid profile, continue statin    3.  Obesity - outpatient medical management.   consider outpatient GLP-1 agonist.     Pt is advised to follow up with me at discharge.  Please call  to schedule an appointment.

## 2019-11-22 NOTE — H&P ADULT - PROBLEM SELECTOR PLAN 1
Admit to Orthopedic Service   Patient to undergo right knee I&D, possible poly exchange with Dr. Estes today 11/22/19  Patient to be seen by Dr Caldera- cardiologist today prior to OR   maintain NPO  preop labs, EKG, CXR per ER  hold abx until OR

## 2019-11-22 NOTE — H&P ADULT - NSHPPHYSICALEXAM_GEN_ALL_CORE
Gen:  65 y/o male well nourished, well developed, NAD  Neuro: AAAO x3   HEENT: normocephalic, atraumatic   CV: no peripheral edema, peripheral pulses present   Resp: normal respiratory effort on room air   MSK: Right knee: R knee incision s/p right TKA with + erythema, + dehiscence with purulent oozing, + soft tissue swelling   AROM at the right knee 0-70 degrees with minimal discomfort  calves soft, nontender bilaterally   sensation intact to light touch lower extremities  DP 2+,  brisk capillary refill  EHL/FHL/TA/GS 5/5 bilaterally     Rest of PE per MD clearance

## 2019-11-22 NOTE — ED ADULT NURSE NOTE - PMH
Atherosclerosis of coronary artery  CAD (coronary artery disease)  Blood clot due to device, implant, or graft  was on blood thinners  Chronic systolic CHF (congestive heart failure)    Diabetes    HLD (hyperlipidemia)    Osteoarthritis    Status post percutaneous transluminal coronary angioplasty  in 2012

## 2019-11-22 NOTE — ED ADULT TRIAGE NOTE - OTHER COMPLAINTS
pt had R knee replacement surgery 10/21. c.o redness/swelling/fluid/pus to site. denies fever/chills. seen here on monday given bactrim

## 2019-11-22 NOTE — CONSULT NOTE ADULT - SUBJECTIVE AND OBJECTIVE BOX
HPI:  65 y/o male with PMHx significant for DMII, CHF, CAD s/p CABG, MI, RCA stent s/p R TKA on 10-21-19 presents from Dr Estes's office with evidence of right knee infection. Patient relates he felt knee was "overstretched" at PT 1 week ago and noticed some opening of his incision. Patient was seen on outpatient basis by Dr Estes last week who expressed concern over redness and started the patient on oral abx Bactrim. Patient states redness has not improved and he returned to Dr Estes's office today who ultimately sent patient to ED for admission. Patient denies worsening pain at the right knee joint , reports he is amble to range the knee joint as well as ambulate. He endorses redness, pain to palpation, purulent drainage from right knee incision. Denies fever or chills. Reports change to medical history- new diagnosis of celiacs disease with associated diarrhea. All other systems negative. Plan today for admission for return to OR for irrigation and debridement, possible polyethylene liner exchange of the right knee.     Pt known to service from previous admissions and follows with outpatient endorinologist Dr. Cormier.  Has been on insulin pump for five years, uses freestyle CGM.  Insulin settings reviewed - 12a-7a 3.5 units/hour, 7a-12a 4.0 units per hour, total basal ~ 92units/day.  Pt endorses frequent hypoglycemia overnight and has been suspending the rate and using temporary reductions.  Has not been eating well 2/2 to recent celiac dx.        PMH & Surgical Hx:INFECTION OF KNEE  Family history of heart disease (Mother)  Family history of heart disease (Mother)  Handoff  MEWS Score  Diabetes  Blood clot due to device, implant, or graft  CKD (chronic kidney disease) stage 2, GFR 60-89 ml/min  COPD, moderate  Hyperkalemia  HLD (hyperlipidemia)  Chronic systolic CHF (congestive heart failure)  Osteoarthritis  HTN (hypertension)  Type 2 diabetes mellitus with neurological manifestations  Type 2 diabetes mellitus  History of surgery to heart and great vessels, presenting hazards to health  Atherosclerosis of coronary artery  Status post percutaneous transluminal coronary angioplasty  Infection of knee  Blood clot due to device, implant, or graft  Atherosclerosis of coronary artery  Status post percutaneous transluminal coronary angioplasty  Chronic systolic CHF (congestive heart failure)  HLD (hyperlipidemia)  Diabetes  Infection of superficial incisional surgical site after procedure, initial encounter  S/P CABG x 1  Stented coronary artery  History of surgery to major organs, presenting hazards to health  Other postprocedural status  POST OP COMP  27      FH:  DM:  Thyroid:  Autoimmune:  Other:    SH:  Smoking:  Etoh:  Recreational Drugs:  Social Life:    Current Meds:  acetaminophen   Tablet .. 650 milliGRAM(s) Oral every 6 hours PRN  ascorbic acid 500 milliGRAM(s) Oral two times a day  BUpivacaine liposome 1.3% Injectable (no eMAR) 20 milliLiter(s) Local Injection once  dextrose 40% Gel 15 Gram(s) Oral once PRN  dextrose 5%. 1000 milliLiter(s) IV Continuous <Continuous>  dextrose 50% Injectable 12.5 Gram(s) IV Push once  dextrose 50% Injectable 25 Gram(s) IV Push once  dextrose 50% Injectable 25 Gram(s) IV Push once  ferrous    sulfate 325 milliGRAM(s) Oral three times a day with meals  folic acid 1 milliGRAM(s) Oral daily  glucagon  Injectable 1 milliGRAM(s) IntraMuscular once PRN  HYDROmorphone  Injectable 0.5 milliGRAM(s) IV Push every 4 hours PRN  insulin glargine Injectable (LANTUS) 35 Unit(s) SubCutaneous at bedtime  insulin lispro (HumaLOG) corrective regimen sliding scale   SubCutaneous Before meals and at bedtime  lactated ringers. 1000 milliLiter(s) IV Continuous <Continuous>  metoclopramide Injectable 10 milliGRAM(s) IV Push every 6 hours PRN  metoprolol succinate  milliGRAM(s) Oral daily  multivitamin 1 Tablet(s) Oral daily  oxyCODONE    IR 10 milliGRAM(s) Oral every 4 hours PRN  oxyCODONE    IR 5 milliGRAM(s) Oral every 4 hours PRN  pantoprazole    Tablet 40 milliGRAM(s) Oral before breakfast  polyethylene glycol 3350 17 Gram(s) Oral daily  senna 2 Tablet(s) Oral at bedtime PRN      Allergies:  ACE inhibitors (Hives)  enalapril (Hives)      ROS:  Denies the following except as indicated.    General: weight loss/weight gain, decreased appetite, fatigue  Eyes: Blurry vision, double vision, visual changes  ENT: Throat pain, changes in voice,   CV: palpitations, SOB, CP, cough  GI: NVD, difficulty swallowing, abdominal pain  : polyuria, dysuria  Endo: abnormal menses, temperature intolerance, decreased libido  MSK: weakness, joint pain  Skin: rash, dryness, diaphoresis  Heme: Easy bruising,bleeding  Neuro: HA, dizziness, lightheadedness, numbness tingling  Psych: Anxiety, Depression    Vital Signs Last 24 Hrs  T(C): 36.8 (22 Nov 2019 15:39), Max: 37.1 (22 Nov 2019 11:11)  T(F): 98.2 (22 Nov 2019 15:39), Max: 98.8 (22 Nov 2019 11:11)  HR: 74 (22 Nov 2019 15:39) (67 - 74)  BP: 112/68 (22 Nov 2019 15:39) (96/51 - 112/68)  BP(mean): --  RR: 16 (22 Nov 2019 15:39) (16 - 16)  SpO2: 99% (22 Nov 2019 11:11) (98% - 99%)  Height (cm): 185.42 (11-21 @ 18:14)    Constitutional: wn/wd in NAD.   HEENT: NCAT, MMM, OP clear, EOMI, , no proptosis or lid retraction  Neck: no thyromegaly or palpable thyroid nodules   Respiratory: lungs CTAB.  Cardiovascular: regular rhythm, normal S1 and S2, no audible murmurs  GI: soft, NT/ND, no masses/HSM appreciated.  Neurology: no tremors, DTR 2+  Skin: no visible rashes/lesions  Psychiatric: AAO x 3, normal affect/mood.  Ext: radial pulses intact, DP pulses intact, extremities warm, no cyanosis, clubbing or edema.       LABS:                        11.0   8.22  )-----------( 285      ( 22 Nov 2019 10:50 )             37.0     11-22    139  |  102  |  26<H>  ----------------------------<  238<H>  5.0   |  27  |  1.45<H>    Ca    9.2      22 Nov 2019 10:50    TPro  7.1  /  Alb  4.0  /  TBili  1.4<H>  /  DBili  x   /  AST  22  /  ALT  17  /  AlkPhos  93  11-22    PT/INR - ( 22 Nov 2019 10:50 )   PT: 16.1 sec;   INR: 1.41          PTT - ( 22 Nov 2019 10:50 )  PTT:27.0 sec    Hemoglobin A1C, Whole Blood: 7.5 (10-22 @ 07:00)          CAPILLARY BLOOD GLUCOSE      POCT Blood Glucose.: 120 mg/dL (22 Nov 2019 13:13) HPI:  65 y/o male with PMHx significant for DMII, CHF, CAD s/p CABG, MI, RCA stent s/p R TKA on 10-21-19 presents from Dr Estes's office with evidence of right knee infection. Patient relates he felt knee was "overstretched" at PT 1 week ago and noticed some opening of his incision. Patient was seen on outpatient basis by Dr Estes last week who expressed concern over redness and started the patient on oral abx Bactrim. Patient states redness has not improved and he returned to Dr Estes's office today who ultimately sent patient to ED for admission. Patient denies worsening pain at the right knee joint , reports he is amble to range the knee joint as well as ambulate. He endorses redness, pain to palpation, purulent drainage from right knee incision. Denies fever or chills. Reports change to medical history- new diagnosis of celiacs disease with associated diarrhea. All other systems negative. Plan today for admission for return to OR for irrigation and debridement, possible polyethylene liner exchange of the right knee.     Pt known to service from previous admissions and follows with outpatient endorinologist Dr. Cormier.  Has been on insulin pump for five years, uses freestyle CGM.  Insulin settings reviewed - 12a-7a 3.5 units/hour, 7a-12a 4.0 units per hour, total basal ~ 92units/day.  Pt endorses frequent hypoglycemia overnight and has been suspending the rate and using temporary reductions.  Has not been eating well 2/2 to recent celiac dx.        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 650 milliGRAM(s) Oral every 6 hours PRN  ascorbic acid 500 milliGRAM(s) Oral two times a day  BUpivacaine liposome 1.3% Injectable (no eMAR) 20 milliLiter(s) Local Injection once  dextrose 40% Gel 15 Gram(s) Oral once PRN  dextrose 5%. 1000 milliLiter(s) IV Continuous <Continuous>  dextrose 50% Injectable 12.5 Gram(s) IV Push once  dextrose 50% Injectable 25 Gram(s) IV Push once  dextrose 50% Injectable 25 Gram(s) IV Push once  ferrous    sulfate 325 milliGRAM(s) Oral three times a day with meals  folic acid 1 milliGRAM(s) Oral daily  glucagon  Injectable 1 milliGRAM(s) IntraMuscular once PRN  HYDROmorphone  Injectable 0.5 milliGRAM(s) IV Push every 4 hours PRN  insulin glargine Injectable (LANTUS) 35 Unit(s) SubCutaneous at bedtime  insulin lispro (HumaLOG) corrective regimen sliding scale   SubCutaneous Before meals and at bedtime  lactated ringers. 1000 milliLiter(s) IV Continuous <Continuous>  metoclopramide Injectable 10 milliGRAM(s) IV Push every 6 hours PRN  metoprolol succinate  milliGRAM(s) Oral daily  multivitamin 1 Tablet(s) Oral daily  oxyCODONE    IR 10 milliGRAM(s) Oral every 4 hours PRN  oxyCODONE    IR 5 milliGRAM(s) Oral every 4 hours PRN  pantoprazole    Tablet 40 milliGRAM(s) Oral before breakfast  polyethylene glycol 3350 17 Gram(s) Oral daily  senna 2 Tablet(s) Oral at bedtime PRN      Allergies:  ACE inhibitors (Hives)  enalapril (Hives)      ROS:  Denies the following except as indicated.    General: weight loss/weight gain, decreased appetite, fatigue  Eyes: Blurry vision, double vision, visual changes  ENT: Throat pain, changes in voice,   CV: palpitations, SOB, CP, cough  GI: NVD, difficulty swallowing, abdominal pain  : polyuria, dysuria  Endo: abnormal menses, temperature intolerance, decreased libido  MSK: weakness, joint pain  Skin: rash, dryness, diaphoresis  Heme: Easy bruising,bleeding  Neuro: HA, dizziness, lightheadedness, numbness tingling  Psych: Anxiety, Depression    Vital Signs Last 24 Hrs  T(C): 36.8 (22 Nov 2019 15:39), Max: 37.1 (22 Nov 2019 11:11)  T(F): 98.2 (22 Nov 2019 15:39), Max: 98.8 (22 Nov 2019 11:11)  HR: 74 (22 Nov 2019 15:39) (67 - 74)  BP: 112/68 (22 Nov 2019 15:39) (96/51 - 112/68)  BP(mean): --  RR: 16 (22 Nov 2019 15:39) (16 - 16)  SpO2: 99% (22 Nov 2019 11:11) (98% - 99%)  Height (cm): 185.42 (11-21 @ 18:14)    Constitutional: wn/wd in NAD.   HEENT: NCAT, MMM, OP clear, EOMI, , no proptosis or lid retraction  Neck: no thyromegaly or palpable thyroid nodules   Respiratory: lungs CTAB.  Cardiovascular: regular rhythm, normal S1 and S2, no audible murmurs  GI: soft, NT/ND, no masses/HSM appreciated.  Neurology: no tremors, DTR 2+  Skin: no visible rashes/lesions  Psychiatric: AAO x 3, normal affect/mood.  Ext: radial pulses intact, DP pulses intact, extremities warm, no cyanosis, clubbing or edema.       LABS:                        11.0   8.22  )-----------( 285      ( 22 Nov 2019 10:50 )             37.0     11-22    139  |  102  |  26<H>  ----------------------------<  238<H>  5.0   |  27  |  1.45<H>    Ca    9.2      22 Nov 2019 10:50    TPro  7.1  /  Alb  4.0  /  TBili  1.4<H>  /  DBili  x   /  AST  22  /  ALT  17  /  AlkPhos  93  11-22    PT/INR - ( 22 Nov 2019 10:50 )   PT: 16.1 sec;   INR: 1.41          PTT - ( 22 Nov 2019 10:50 )  PTT:27.0 sec    Hemoglobin A1C, Whole Blood: 7.5 (10-22 @ 07:00)          CAPILLARY BLOOD GLUCOSE      POCT Blood Glucose.: 120 mg/dL (22 Nov 2019 13:13)

## 2019-11-22 NOTE — PROGRESS NOTE ADULT - SUBJECTIVE AND OBJECTIVE BOX
Ortho Post Op Check    Procedure: R knee I&D and Poly swap  Surgeon: Dr. Estes    Pt comfortable without complaints, pain controlled  Denies CP, SOB, N/V, numbness/tingling     Vital Signs Last 24 Hrs  T(C): 36.6 (11-22-19 @ 20:45), Max: 37.6 (11-22-19 @ 19:00)  T(F): 97.8 (11-22-19 @ 20:45), Max: 99.6 (11-22-19 @ 19:00)  HR: 78 (11-22-19 @ 20:45) (74 - 88)  BP: 117/62 (11-22-19 @ 20:45) (108/55 - 135/65)  BP(mean): 85 (11-22-19 @ 20:45) (76 - 93)  RR: 14 (11-22-19 @ 20:45) (12 - 20)  SpO2: 97% (11-22-19 @ 20:45) (93% - 98%)      Gen:  comfortable in bed, in NAD  Prevena in place, holding suction  sensation intact to light touch lower extremities  DP 2+,  brisk capillary refill  EHL/FHL/TA/GS 5/5 bilaterally                           11.0   8.22  )-----------( 285      ( 22 Nov 2019 10:50 )             37.0   22 Nov 2019 10:50    139    |  102    |  26     ----------------------------<  238    5.0     |  27     |  1.45       TPro  7.1    /  Alb  4.0    /  TBili  1.4    /  DBili  x      /  AST  22     /  ALT  17     /  AlkPhos  93     22 Nov 2019 10:50      Post-op X-Ray:  pending    A/P: 64yMale POD#0 s/p above procedure  - Stable  - Pain Control  - DVT ppx: Asa  - Post op abx: rifampin and vanco  - PT, WBS: WBAT    Ortho Pager [phone]

## 2019-11-22 NOTE — ED ADULT NURSE NOTE - PSH
Other postprocedural status  Fixation hardware in foot LEFT  S/P CABG x 1  2018  Stented coronary artery  10/18 heart attack  INFERIOR WALL MI

## 2019-11-22 NOTE — ED ADULT NURSE NOTE - OBJECTIVE STATEMENT
Patient c/o R knee replacement on 10/21. Patient's procedure site became infected; patient saw his DrTima on Monday and was prescribed Bactrim. Patient's site has not healed, so patient's MD sent him to hospital to have his wound irrigated. Patient states pain is 5/10, aggravated with activity. PMH per patient: dm, htn, CABG 2018  Pt on blood thinner and has insulin pump. Patient aox3 and ambulatory with slight impairment due to knee pain.

## 2019-11-22 NOTE — HISTORY OF PRESENT ILLNESS
[Fair Pain Control] : has fair pain control [Signs of Infection] : is showing signs of an infection [Chills] : no chills [Constipation] : no constipation [Diarrhea] : no diarrhea [Dysuria] : no dysuria [Fever] : no fever [Nausea] : no nausea [Vomiting] : no vomiting [de-identified] : Wound check s/p R TKA 10/21/19 [de-identified] : Patient presents for wound check s/p R TKA 10/21/19. Patient states wound is slightly more red and swollen. He notes mild purulent drainage from proximal incision.  [de-identified] : Patient is alert and oriented. \par Right knee: Multiple areas of superficial dehiscence measuring 2 mm in width and 2 mm in depth along the incision, with focal nisa-incisional erythema that improves. Area of fluctuance to proximal wound. No instability. ROM from a <5 degree flexion contracture to 95 degrees of flexion. \par Calf is soft and nontender.\par NVI.  [de-identified] : No new images taken today [de-identified] : Will plan to admit patient through ER for superficial I&D vs I&D with poly exchange. Risks and benefits discussed in detail. Discussed possibility of wound vac. Will plan to admit overnight to administer IV antibiotics.

## 2019-11-22 NOTE — ED ADULT NURSE NOTE - CHPI ED NUR SYMPTOMS NEG
no purulent drainage/no redness/no chills/no blood in mucus/no bleeding at site/no fever/no rectal pain

## 2019-11-22 NOTE — CONSULT NOTE ADULT - SUBJECTIVE AND OBJECTIVE BOX
CHIEF COMPLAINT:  Coronary artery disease  HISTORY OF PRESENT ILLNESS:  The patient has a history of STEMI. He has had coronary stent thrombosis even while on Plavix. He has a history of coronary bypass surgery. In October of 2018 the patient had a stent placed in his right coronary artery. The angiogram in late 2018 showed a 95% first obtuse marginal which was successfully stented with a drug-eluting stent. The bypass to the LAD was patent. The echocardiogram in October showed an EF of 30% and severe pulmonary hypertension. The patient walks 5 block w/o dyspnea of chest pain.     Chart, PMH, medication and allergies reviewed  PAST MEDICAL & SURGICAL HISTORY:  Diabetes  Blood clot due to device, implant, or graft: was on blood thinners  HLD (hyperlipidemia)  Chronic systolic CHF (congestive heart failure)  Osteoarthritis  Atherosclerosis of coronary artery: CAD (coronary artery disease)  Status post percutaneous transluminal coronary angioplasty: in 2012  S/P CABG x 1: 2018  Stented coronary artery: 10/18   Other postprocedural status: Fixation hardware in foot LEFT      [ x ] Diabetes   [ x ] Hypertension  [ x ] Hyperlipidemia  [x  ] CAD  [x  ] PCI  [ x ] CABG    PREVIOUS DIAGNOSTIC TESTING:    [ x ] Echocardiogram:  [x  ]  Catheterization:  [ x ] Stress Test:  	    MEDICATIONS:  MEDICATIONS  (STANDING):  ascorbic acid 500 milliGRAM(s) Oral two times a day  BUpivacaine liposome 1.3% Injectable (no eMAR) 20 milliLiter(s) Local Injection once  dextrose 5%. 1000 milliLiter(s) (50 mL/Hr) IV Continuous <Continuous>  dextrose 50% Injectable 12.5 Gram(s) IV Push once  dextrose 50% Injectable 25 Gram(s) IV Push once  dextrose 50% Injectable 25 Gram(s) IV Push once  ferrous    sulfate 325 milliGRAM(s) Oral three times a day with meals  folic acid 1 milliGRAM(s) Oral daily  insulin glargine Injectable (LANTUS) 35 Unit(s) SubCutaneous at bedtime  insulin lispro (HumaLOG) corrective regimen sliding scale   SubCutaneous Before meals and at bedtime  lactated ringers. 1000 milliLiter(s) (100 mL/Hr) IV Continuous <Continuous>  metoprolol succinate  milliGRAM(s) Oral daily  multivitamin 1 Tablet(s) Oral daily  pantoprazole    Tablet 40 milliGRAM(s) Oral before breakfast  polyethylene glycol 3350 17 Gram(s) Oral daily      FAMILY HISTORY:  Family history of heart disease (Mother)  Family history of heart disease (Mother)      SOCIAL HISTORY:    [  ] Never smoker  [  ] Non-smoker  [  ] Smoker  [  ] Social alcohol use  [ x ] Former smoker    Allergies    ACE inhibitors (Hives)  enalapril (Hives)    Intolerances    	    REVIEW OF SYSTEMS:  CONSTITUTIONAL: No fever, weight loss, or fatigue  EYES: No eye pain, visual disturbances, or discharge  ENT:  No difficulty hearing, tinnitus, vertigo; No sinus or throat pain  NECK: No pain or stiffness  PULMONARY: No cough, no wheezing, chills or hemoptysis; No Shortness of Breath  CARDIOVASCULAR: No chest pain, no  palpitations, no passing out, dizziness, no leg swelling  GASTROINTESTINAL: No abdominal  pain. No nausea, vomiting, or hematemesis; No diarrhea or constipation. No melena or hematochezia.  GENITOURINARY: No dysuria, frequency, hematuria, or incontinence  NEUROLOGICAL: No headaches, memory loss, loss of strength, numbness, or tremors  SKIN: No itching, burning, rashes, or lesions   LYMPH Nodes: No enlarged glands  ENDOCRINE: No heat or cold intolerance; No hair loss  MUSCULOSKELETAL: R knee pain and swelling; No muscle, back, or extremity pain  PSYCHIATRIC: No depression, anxiety, mood swings, or difficulty sleeping  HEME/LYMPH: No easy bruising, or bleeding gums  ALLERGY AND IMMUNOLOGIC: No hives or eczema	    PHYSICAL EXAM:  T(C): 36.8 (11-22-19 @ 15:39), Max: 37.1 (11-22-19 @ 11:11)  HR: 74 (11-22-19 @ 15:39) (67 - 74)  BP: 112/68 (11-22-19 @ 15:39) (96/51 - 112/68)  RR: 16 (11-22-19 @ 15:39) (16 - 16)  SpO2: 99% (11-22-19 @ 11:11) (98% - 99%)  Wt(kg): --  I&O's Summary      Appearance: Normal	  HEENT:   Normal oral mucosa, PERRL, EOMI	  Lymphatic: No lymphadenopathy  Cardiovascular: Normal S1 S2, No JVD, No murmur, No edema  Pulmonary: Lungs clear to auscultation	  Psychiatry: A & O x 3, Mood & affect appropriate  Gastrointestinal:  Soft, Non-tender, + BS	  Skin: No rashes, No ecchymoses, No cyanosis	  Neurologic: Non-focal  Extremities: Normal range of motion, No clubbing or cyanosis, R knee bandaged  	 	  CARDIAC MARKERS:                                11.0   8.22  )-----------( 285      ( 22 Nov 2019 10:50 )             37.0     11-22    139  |  102  |  26<H>  ----------------------------<  238<H>  5.0   |  27  |  1.45<H>    Ca    9.2      22 Nov 2019 10:50    TPro  7.1  /  Alb  4.0  /  TBili  1.4<H>  /  DBili  x   /  AST  22  /  ALT  17  /  AlkPhos  93  11-22    proBNP:  Lipid Profile:  HgA1c:  TSH:  PT/INR - ( 22 Nov 2019 10:50 )   PT: 16.1 sec;   INR: 1.41          PTT - ( 22 Nov 2019 10:50 )  PTT:27.0 sec  TELEMETRY: 	    ECG: NSR T wave abn, no change 	QTC  RADIOLOGY:	    ASSESSMENT/PLAN: 	  Coronary artery disease/stent-the patient had a coronary stent one year ago. He has no chest pain. He is a very high clotting risk. Restart prasugrel as soon as possible. It is essential that patient receive aspirin continuously. Rx statin and metoprolol.     Cardiomyopathy -no clinical congestive heart failure. However, his pulmonary pressure is very high. Minimize fluids.     Ventricular arrhythmia-asymptomatic. Continue metoprolol. Follow up with electrophysiology as soon as possible after discharge.     Status post right knee replacement-as per Dr. Estes. For surgery today. It is a low risk procedure. The patient's RCRI score is 3. He is high cardiac risk. He is optimized for surgery.

## 2019-11-22 NOTE — H&P ADULT - HISTORY OF PRESENT ILLNESS
63 y/o male with PMHx significant for DMII, CHF, CAD s/p CABG, MI, RCA stent s/p R TKA on 10-21-19 presents from Dr Estes's office with evidence of right knee infection. Patient relates he felt knee was "overstretched" at PT 1 week ago and noticed some opening of his incision. Patient was seen on outpatient basis by Dr Estes last week who expressed concern over redness and started the patient on oral abx Bactrim. Patient states redness has not improved and he returned to Dr Estes's office today who ultimately sent patient to ED for admission. Patient denies worsening pain at the right knee joint 63 y/o male with PMHx significant for DMII, CHF, CAD s/p CABG, MI, RCA stent s/p R TKA on 10-21-19 presents from Dr Estes's office with evidence of right knee infection. Patient relates he felt knee was "overstretched" at PT 1 week ago and noticed some opening of his incision. Patient was seen on outpatient basis by Dr Estes last week who expressed concern over redness and started the patient on oral abx Bactrim. Patient states redness has not improved and he returned to Dr Estes's office today who ultimately sent patient to ED for admission. Patient denies worsening pain at the right knee joint , reports he is amble to range the knee joint as well as ambulate. He endorses redness, pain to palpation, purulent drainage from right knee incision. Denies fever or chills. Reports change to medical history- new diagnosis of celiacs disease with associated diarrhea. All other systems negative. Plan today for admission for return to OR for irrigation and debridement, possible polyethylene liner exchange of the right knee.

## 2019-11-22 NOTE — ED PROVIDER NOTE - PHYSICAL EXAMINATION
CONSTITUTIONAL: Well-appearing; well-nourished; in no apparent distress.   HEAD: Normocephalic; atraumatic.   EYES: PERRL; EOM intact; conjunctiva and sclera clear  ENT: normal nose; no rhinorrhea; MMM.   NECK: Supple; non-tender;   CARDIOVASCULAR: Normal S1, S2; no murmurs, rubs, or gallops. Regular rate and rhythm.   RESPIRATORY: Breathing easily; breath sounds clear and equal bilaterally; no wheezes, rhonchi, or rales.  GI: Soft; non-distended; non-tender; no palpable organomegaly.   EXT: RLE: 10-12cm incision overlying the anterior knee with associated erythema, and slight serous drainage on bandage, no palpable fluctuance, no pain with movement, nml strength with F/E at knee, foot warm with good cap refill, +PT pulse, no acute findings to LLE.   SKIN: warm; dry; good turgor; dry cracked skin to fingers and hands  NEURO: A & O x 3; face symmetric; grossly unremarkable.   PSYCHOLOGICAL: The patient’s mood and manner are appropriate.

## 2019-11-23 LAB
ANION GAP SERPL CALC-SCNC: 10 MMOL/L — SIGNIFICANT CHANGE UP (ref 5–17)
BUN SERPL-MCNC: 25 MG/DL — HIGH (ref 7–23)
CALCIUM SERPL-MCNC: 8.5 MG/DL — SIGNIFICANT CHANGE UP (ref 8.4–10.5)
CHLORIDE SERPL-SCNC: 98 MMOL/L — SIGNIFICANT CHANGE UP (ref 96–108)
CO2 SERPL-SCNC: 24 MMOL/L — SIGNIFICANT CHANGE UP (ref 22–31)
CREAT SERPL-MCNC: 1.23 MG/DL — SIGNIFICANT CHANGE UP (ref 0.5–1.3)
GLUCOSE BLDC GLUCOMTR-MCNC: 242 MG/DL — HIGH (ref 70–99)
GLUCOSE BLDC GLUCOMTR-MCNC: 249 MG/DL — HIGH (ref 70–99)
GLUCOSE BLDC GLUCOMTR-MCNC: 269 MG/DL — HIGH (ref 70–99)
GLUCOSE BLDC GLUCOMTR-MCNC: 293 MG/DL — HIGH (ref 70–99)
GLUCOSE SERPL-MCNC: 264 MG/DL — HIGH (ref 70–99)
HCT VFR BLD CALC: 32.5 % — LOW (ref 39–50)
HGB BLD-MCNC: 9.8 G/DL — LOW (ref 13–17)
MCHC RBC-ENTMCNC: 26.1 PG — LOW (ref 27–34)
MCHC RBC-ENTMCNC: 30.2 GM/DL — LOW (ref 32–36)
MCV RBC AUTO: 86.4 FL — SIGNIFICANT CHANGE UP (ref 80–100)
NRBC # BLD: 0 /100 WBCS — SIGNIFICANT CHANGE UP (ref 0–0)
PLATELET # BLD AUTO: 261 K/UL — SIGNIFICANT CHANGE UP (ref 150–400)
POTASSIUM SERPL-MCNC: 4.8 MMOL/L — SIGNIFICANT CHANGE UP (ref 3.5–5.3)
POTASSIUM SERPL-SCNC: 4.8 MMOL/L — SIGNIFICANT CHANGE UP (ref 3.5–5.3)
RBC # BLD: 3.76 M/UL — LOW (ref 4.2–5.8)
RBC # FLD: 19 % — HIGH (ref 10.3–14.5)
SODIUM SERPL-SCNC: 132 MMOL/L — LOW (ref 135–145)
WBC # BLD: 9.72 K/UL — SIGNIFICANT CHANGE UP (ref 3.8–10.5)
WBC # FLD AUTO: 9.72 K/UL — SIGNIFICANT CHANGE UP (ref 3.8–10.5)

## 2019-11-23 RX ORDER — INSULIN GLARGINE 100 [IU]/ML
48 INJECTION, SOLUTION SUBCUTANEOUS AT BEDTIME
Refills: 0 | Status: DISCONTINUED | OUTPATIENT
Start: 2019-11-23 | End: 2019-11-24

## 2019-11-23 RX ORDER — INSULIN LISPRO 100/ML
16 VIAL (ML) SUBCUTANEOUS
Refills: 0 | Status: DISCONTINUED | OUTPATIENT
Start: 2019-11-23 | End: 2019-11-27

## 2019-11-23 RX ADMIN — Medication 500 MILLIGRAM(S): at 06:51

## 2019-11-23 RX ADMIN — Medication 100 MILLIGRAM(S): at 06:51

## 2019-11-23 RX ADMIN — HYDROMORPHONE HYDROCHLORIDE 0.5 MILLIGRAM(S): 2 INJECTION INTRAMUSCULAR; INTRAVENOUS; SUBCUTANEOUS at 20:12

## 2019-11-23 RX ADMIN — Medication 325 MILLIGRAM(S): at 12:49

## 2019-11-23 RX ADMIN — INSULIN GLARGINE 48 UNIT(S): 100 INJECTION, SOLUTION SUBCUTANEOUS at 22:40

## 2019-11-23 RX ADMIN — OXYCODONE HYDROCHLORIDE 10 MILLIGRAM(S): 5 TABLET ORAL at 13:15

## 2019-11-23 RX ADMIN — Medication 250 MILLIGRAM(S): at 07:53

## 2019-11-23 RX ADMIN — ATORVASTATIN CALCIUM 40 MILLIGRAM(S): 80 TABLET, FILM COATED ORAL at 22:03

## 2019-11-23 RX ADMIN — Medication 1 TABLET(S): at 12:48

## 2019-11-23 RX ADMIN — HYDROMORPHONE HYDROCHLORIDE 0.5 MILLIGRAM(S): 2 INJECTION INTRAMUSCULAR; INTRAVENOUS; SUBCUTANEOUS at 00:49

## 2019-11-23 RX ADMIN — OXYCODONE HYDROCHLORIDE 10 MILLIGRAM(S): 5 TABLET ORAL at 23:16

## 2019-11-23 RX ADMIN — OXYCODONE HYDROCHLORIDE 10 MILLIGRAM(S): 5 TABLET ORAL at 00:00

## 2019-11-23 RX ADMIN — PRASUGREL 10 MILLIGRAM(S): 5 TABLET, FILM COATED ORAL at 12:49

## 2019-11-23 RX ADMIN — Medication 500 MILLIGRAM(S): at 18:51

## 2019-11-23 RX ADMIN — OXYCODONE HYDROCHLORIDE 10 MILLIGRAM(S): 5 TABLET ORAL at 06:53

## 2019-11-23 RX ADMIN — ONDANSETRON 4 MILLIGRAM(S): 8 TABLET, FILM COATED ORAL at 07:57

## 2019-11-23 RX ADMIN — Medication 81 MILLIGRAM(S): at 06:51

## 2019-11-23 RX ADMIN — Medication 6: at 17:29

## 2019-11-23 RX ADMIN — OXYCODONE HYDROCHLORIDE 10 MILLIGRAM(S): 5 TABLET ORAL at 02:55

## 2019-11-23 RX ADMIN — Medication 1 MILLIGRAM(S): at 12:48

## 2019-11-23 RX ADMIN — OXYCODONE HYDROCHLORIDE 10 MILLIGRAM(S): 5 TABLET ORAL at 07:30

## 2019-11-23 RX ADMIN — Medication 4: at 09:37

## 2019-11-23 RX ADMIN — HYDROMORPHONE HYDROCHLORIDE 0.5 MILLIGRAM(S): 2 INJECTION INTRAMUSCULAR; INTRAVENOUS; SUBCUTANEOUS at 21:00

## 2019-11-23 RX ADMIN — Medication 325 MILLIGRAM(S): at 18:51

## 2019-11-23 RX ADMIN — OXYCODONE HYDROCHLORIDE 10 MILLIGRAM(S): 5 TABLET ORAL at 12:48

## 2019-11-23 RX ADMIN — POLYETHYLENE GLYCOL 3350 17 GRAM(S): 17 POWDER, FOR SOLUTION ORAL at 12:49

## 2019-11-23 RX ADMIN — HYDROMORPHONE HYDROCHLORIDE 0.5 MILLIGRAM(S): 2 INJECTION INTRAMUSCULAR; INTRAVENOUS; SUBCUTANEOUS at 10:47

## 2019-11-23 RX ADMIN — OXYCODONE HYDROCHLORIDE 10 MILLIGRAM(S): 5 TABLET ORAL at 03:30

## 2019-11-23 RX ADMIN — HYDROMORPHONE HYDROCHLORIDE 0.5 MILLIGRAM(S): 2 INJECTION INTRAMUSCULAR; INTRAVENOUS; SUBCUTANEOUS at 01:15

## 2019-11-23 RX ADMIN — Medication 325 MILLIGRAM(S): at 07:55

## 2019-11-23 RX ADMIN — Medication 4: at 22:03

## 2019-11-23 RX ADMIN — OXYCODONE HYDROCHLORIDE 10 MILLIGRAM(S): 5 TABLET ORAL at 18:51

## 2019-11-23 RX ADMIN — PANTOPRAZOLE SODIUM 40 MILLIGRAM(S): 20 TABLET, DELAYED RELEASE ORAL at 06:51

## 2019-11-23 RX ADMIN — HYDROMORPHONE HYDROCHLORIDE 0.5 MILLIGRAM(S): 2 INJECTION INTRAMUSCULAR; INTRAVENOUS; SUBCUTANEOUS at 11:05

## 2019-11-23 RX ADMIN — Medication 81 MILLIGRAM(S): at 18:51

## 2019-11-23 NOTE — PROGRESS NOTE ADULT - SUBJECTIVE AND OBJECTIVE BOX
full note to follow  increase lantus to 48 units and 16 units lispro TID with meals.     INTERVAL HPI/OVERNIGHT EVENTS:    Patient is a 64y old  Male who presents with a chief complaint of Right knee infection (23 Nov 2019 08:18)      Pt reports the following symptoms:    CONSTITUTIONAL:  Negative fever or chills, feels well, good appetite  EYES:  Negative  blurry vision or double vision  CARDIOVASCULAR:  Negative for chest pain or palpitations  RESPIRATORY:  Negative for cough, wheezing, or SOB   GASTROINTESTINAL:  Negative for nausea, vomiting, diarrhea, constipation, or abdominal pain  GENITOURINARY:  Negative frequency, urgency or dysuria  NEUROLOGIC:  No headache, confusion, dizziness, lightheadedness    MEDICATIONS  (STANDING):  ascorbic acid 500 milliGRAM(s) Oral two times a day  aspirin enteric coated 81 milliGRAM(s) Oral two times a day  atorvastatin 40 milliGRAM(s) Oral at bedtime  BUpivacaine liposome 1.3% Injectable (no eMAR) 20 milliLiter(s) Local Injection once  dextrose 5%. 1000 milliLiter(s) (50 mL/Hr) IV Continuous <Continuous>  dextrose 5%. 1000 milliLiter(s) (50 mL/Hr) IV Continuous <Continuous>  dextrose 50% Injectable 12.5 Gram(s) IV Push once  dextrose 50% Injectable 25 Gram(s) IV Push once  dextrose 50% Injectable 25 Gram(s) IV Push once  dextrose 50% Injectable 12.5 Gram(s) IV Push once  dextrose 50% Injectable 25 Gram(s) IV Push once  dextrose 50% Injectable 25 Gram(s) IV Push once  ferrous    sulfate 325 milliGRAM(s) Oral three times a day with meals  folic acid 1 milliGRAM(s) Oral daily  insulin glargine Injectable (LANTUS) 35 Unit(s) SubCutaneous at bedtime  insulin lispro (HumaLOG) corrective regimen sliding scale   SubCutaneous Before meals and at bedtime  lactated ringers. 1000 milliLiter(s) (80 mL/Hr) IV Continuous <Continuous>  metoprolol succinate  milliGRAM(s) Oral daily  multivitamin 1 Tablet(s) Oral daily  pantoprazole    Tablet 40 milliGRAM(s) Oral before breakfast  polyethylene glycol 3350 17 Gram(s) Oral daily  prasugrel 10 milliGRAM(s) Oral daily  rifAMPin IVPB 300 milliGRAM(s) IV Intermittent three times a day  vancomycin  IVPB 1000 milliGRAM(s) IV Intermittent every 12 hours    MEDICATIONS  (PRN):  acetaminophen   Tablet .. 650 milliGRAM(s) Oral every 6 hours PRN Mild Pain (1 - 3)  aluminum hydroxide/magnesium hydroxide/simethicone Suspension 30 milliLiter(s) Oral four times a day PRN Indigestion  dextrose 40% Gel 15 Gram(s) Oral once PRN Blood Glucose LESS THAN 70 milliGRAM(s)/deciliter  dextrose 40% Gel 15 Gram(s) Oral once PRN Blood Glucose LESS THAN 70 milliGRAM(s)/deciliter  glucagon  Injectable 1 milliGRAM(s) IntraMuscular once PRN Glucose LESS THAN 70 milligrams/deciliter  glucagon  Injectable 1 milliGRAM(s) IntraMuscular once PRN Glucose LESS THAN 70 milligrams/deciliter  HYDROmorphone  Injectable 0.5 milliGRAM(s) IV Push every 4 hours PRN breakthrough pain  HYDROmorphone  Injectable 0.5 milliGRAM(s) IV Push every 15 minutes PRN pacu pain  magnesium hydroxide Suspension 30 milliLiter(s) Oral daily PRN Constipation  metoclopramide Injectable 10 milliGRAM(s) IV Push every 6 hours PRN Nausea and/or Vomiting  ondansetron Injectable 4 milliGRAM(s) IV Push every 6 hours PRN Nausea and/or Vomiting  oxyCODONE    IR 10 milliGRAM(s) Oral every 4 hours PRN Severe Pain (7 - 10)  oxyCODONE    IR 5 milliGRAM(s) Oral every 4 hours PRN Moderate Pain (4 - 6)  senna 2 Tablet(s) Oral at bedtime PRN Constipation      Past medical history reviewed  Family history reviewed  Social history reviewed    PHYSICAL EXAM  Vital Signs Last 24 Hrs  T(C): 37.1 (23 Nov 2019 16:21), Max: 37.6 (22 Nov 2019 19:00)  T(F): 98.8 (23 Nov 2019 16:21), Max: 99.6 (22 Nov 2019 19:00)  HR: 83 (23 Nov 2019 16:21) (70 - 88)  BP: 103/64 (23 Nov 2019 16:21) (89/56 - 155/77)  BP(mean): 97 (22 Nov 2019 21:15) (76 - 97)  RR: 17 (23 Nov 2019 16:21) (12 - 20)  SpO2: 93% (23 Nov 2019 16:21) (93% - 98%)    Constitutional: wn/wd in NAD.   HEENT: NCAT, MMM, OP clear, EOMI, no proptosis or lid retraction  Neck: no thyromegaly or palpable thyroid nodules   Respiratory: lungs CTAB.  Cardiovascular: regular rhythm, normal S1 and S2, no audible murmurs, no peripheral edema  GI: soft, NT/ND, no masses/HSM appreciated.  Neurology: no tremors, DTR 2+  Skin: no visible rashes/lesions  Psychiatric: AAO x 3, normal affect/mood.    LABS:                        9.8    9.72  )-----------( 261      ( 23 Nov 2019 08:11 )             32.5     11-23    132<L>  |  98  |  25<H>  ----------------------------<  264<H>  4.8   |  24  |  1.23    Ca    8.5      23 Nov 2019 08:11    TPro  7.1  /  Alb  4.0  /  TBili  1.4<H>  /  DBili  x   /  AST  22  /  ALT  17  /  AlkPhos  93  11-22    PT/INR - ( 22 Nov 2019 10:50 )   PT: 16.1 sec;   INR: 1.41          PTT - ( 22 Nov 2019 10:50 )  PTT:27.0 sec        HbA1C: 7.5 % (10-22 @ 07:00)    CAPILLARY BLOOD GLUCOSE      POCT Blood Glucose.: 269 mg/dL (23 Nov 2019 16:57)  POCT Blood Glucose.: 249 mg/dL (23 Nov 2019 09:32)  POCT Blood Glucose.: 293 mg/dL (23 Nov 2019 07:37)  POCT Blood Glucose.: 141 mg/dL (22 Nov 2019 21:56)  POCT Blood Glucose.: 160 mg/dL (22 Nov 2019 19:16) INTERVAL HPI/OVERNIGHT EVENTS:  Patient is a 64y old  Male who presents with a chief complaint of Right knee infection (23 Nov 2019 08:18)  pt s/p OR last night  insulin administration reviewed  pt eating well  no acute overnight events    Pt reports the following symptoms:    CONSTITUTIONAL:  Negative fever or chills, feels well, good appetite  EYES:  Negative  blurry vision or double vision  CARDIOVASCULAR:  Negative for chest pain or palpitations  RESPIRATORY:  Negative for cough, wheezing, or SOB   GASTROINTESTINAL:  Negative for nausea, vomiting, diarrhea, constipation, or abdominal pain  GENITOURINARY:  Negative frequency, urgency or dysuria  NEUROLOGIC:  No headache, confusion, dizziness, lightheadedness    MEDICATIONS  (STANDING):  ascorbic acid 500 milliGRAM(s) Oral two times a day  aspirin enteric coated 81 milliGRAM(s) Oral two times a day  atorvastatin 40 milliGRAM(s) Oral at bedtime  BUpivacaine liposome 1.3% Injectable (no eMAR) 20 milliLiter(s) Local Injection once  dextrose 5%. 1000 milliLiter(s) (50 mL/Hr) IV Continuous <Continuous>  dextrose 5%. 1000 milliLiter(s) (50 mL/Hr) IV Continuous <Continuous>  dextrose 50% Injectable 12.5 Gram(s) IV Push once  dextrose 50% Injectable 25 Gram(s) IV Push once  dextrose 50% Injectable 25 Gram(s) IV Push once  dextrose 50% Injectable 12.5 Gram(s) IV Push once  dextrose 50% Injectable 25 Gram(s) IV Push once  dextrose 50% Injectable 25 Gram(s) IV Push once  ferrous    sulfate 325 milliGRAM(s) Oral three times a day with meals  folic acid 1 milliGRAM(s) Oral daily  insulin glargine Injectable (LANTUS) 35 Unit(s) SubCutaneous at bedtime  insulin lispro (HumaLOG) corrective regimen sliding scale   SubCutaneous Before meals and at bedtime  lactated ringers. 1000 milliLiter(s) (80 mL/Hr) IV Continuous <Continuous>  metoprolol succinate  milliGRAM(s) Oral daily  multivitamin 1 Tablet(s) Oral daily  pantoprazole    Tablet 40 milliGRAM(s) Oral before breakfast  polyethylene glycol 3350 17 Gram(s) Oral daily  prasugrel 10 milliGRAM(s) Oral daily  rifAMPin IVPB 300 milliGRAM(s) IV Intermittent three times a day  vancomycin  IVPB 1000 milliGRAM(s) IV Intermittent every 12 hours    MEDICATIONS  (PRN):  acetaminophen   Tablet .. 650 milliGRAM(s) Oral every 6 hours PRN Mild Pain (1 - 3)  aluminum hydroxide/magnesium hydroxide/simethicone Suspension 30 milliLiter(s) Oral four times a day PRN Indigestion  dextrose 40% Gel 15 Gram(s) Oral once PRN Blood Glucose LESS THAN 70 milliGRAM(s)/deciliter  dextrose 40% Gel 15 Gram(s) Oral once PRN Blood Glucose LESS THAN 70 milliGRAM(s)/deciliter  glucagon  Injectable 1 milliGRAM(s) IntraMuscular once PRN Glucose LESS THAN 70 milligrams/deciliter  glucagon  Injectable 1 milliGRAM(s) IntraMuscular once PRN Glucose LESS THAN 70 milligrams/deciliter  HYDROmorphone  Injectable 0.5 milliGRAM(s) IV Push every 4 hours PRN breakthrough pain  HYDROmorphone  Injectable 0.5 milliGRAM(s) IV Push every 15 minutes PRN pacu pain  magnesium hydroxide Suspension 30 milliLiter(s) Oral daily PRN Constipation  metoclopramide Injectable 10 milliGRAM(s) IV Push every 6 hours PRN Nausea and/or Vomiting  ondansetron Injectable 4 milliGRAM(s) IV Push every 6 hours PRN Nausea and/or Vomiting  oxyCODONE    IR 10 milliGRAM(s) Oral every 4 hours PRN Severe Pain (7 - 10)  oxyCODONE    IR 5 milliGRAM(s) Oral every 4 hours PRN Moderate Pain (4 - 6)  senna 2 Tablet(s) Oral at bedtime PRN Constipation      Past medical history reviewed  Family history reviewed  Social history reviewed    PHYSICAL EXAM  Vital Signs Last 24 Hrs  T(C): 37.1 (23 Nov 2019 16:21), Max: 37.6 (22 Nov 2019 19:00)  T(F): 98.8 (23 Nov 2019 16:21), Max: 99.6 (22 Nov 2019 19:00)  HR: 83 (23 Nov 2019 16:21) (70 - 88)  BP: 103/64 (23 Nov 2019 16:21) (89/56 - 155/77)  BP(mean): 97 (22 Nov 2019 21:15) (76 - 97)  RR: 17 (23 Nov 2019 16:21) (12 - 20)  SpO2: 93% (23 Nov 2019 16:21) (93% - 98%)    Constitutional: wn/wd in NAD.   HEENT: NCAT, MMM, OP clear, EOMI, no proptosis or lid retraction  Neck: no thyromegaly or palpable thyroid nodules   Respiratory: lungs CTAB.  Cardiovascular: regular rhythm, normal S1 and S2, no audible murmurs, no peripheral edema  GI: soft, NT/ND, no masses/HSM appreciated.  Neurology: no tremors, DTR 2+  Skin: no visible rashes/lesions  Psychiatric: AAO x 3, normal affect/mood.    LABS:                        9.8    9.72  )-----------( 261      ( 23 Nov 2019 08:11 )             32.5     11-23    132<L>  |  98  |  25<H>  ----------------------------<  264<H>  4.8   |  24  |  1.23    Ca    8.5      23 Nov 2019 08:11    TPro  7.1  /  Alb  4.0  /  TBili  1.4<H>  /  DBili  x   /  AST  22  /  ALT  17  /  AlkPhos  93  11-22    PT/INR - ( 22 Nov 2019 10:50 )   PT: 16.1 sec;   INR: 1.41          PTT - ( 22 Nov 2019 10:50 )  PTT:27.0 sec        HbA1C: 7.5 % (10-22 @ 07:00)    CAPILLARY BLOOD GLUCOSE      POCT Blood Glucose.: 269 mg/dL (23 Nov 2019 16:57)  POCT Blood Glucose.: 249 mg/dL (23 Nov 2019 09:32)  POCT Blood Glucose.: 293 mg/dL (23 Nov 2019 07:37)  POCT Blood Glucose.: 141 mg/dL (22 Nov 2019 21:56)  POCT Blood Glucose.: 160 mg/dL (22 Nov 2019 19:16)

## 2019-11-23 NOTE — PHYSICAL THERAPY INITIAL EVALUATION ADULT - IMPAIRMENTS FOUND, PT EVAL
ROM/ergonomics and body mechanics/gait, locomotion, and balance/joint integrity and mobility/muscle strength/aerobic capacity/endurance/gross motor

## 2019-11-23 NOTE — PROGRESS NOTE ADULT - SUBJECTIVE AND OBJECTIVE BOX
Ortho Progress Note    Procedure: R knee I&D and Poly swap  Surgeon: Dr. Estes    Pt comfortable without complaints, pain controlled  Denies CP, SOB, N/V, numbness/tingling     Vital Signs Last 24 Hrs  T(C): 37.2 (23 Nov 2019 04:47), Max: 37.6 (22 Nov 2019 19:00)  T(F): 99 (23 Nov 2019 04:47), Max: 99.6 (22 Nov 2019 19:00)  HR: 86 (23 Nov 2019 04:47) (67 - 88)  BP: 155/77 (23 Nov 2019 04:47) (96/51 - 155/77)  BP(mean): 97 (22 Nov 2019 21:15) (76 - 97)  RR: 17 (23 Nov 2019 04:47) (12 - 20)  SpO2: 97% (23 Nov 2019 04:47) (93% - 99%)      Gen:  comfortable in bed, in NAD  Prevena in place, holding suction  sensation intact to light touch lower extremities  DP 2+,  brisk capillary refill  EHL/FHL/TA/GS 5/5 bilaterally                                      11.0   8.22  )-----------( 285      ( 22 Nov 2019 10:50 )             37.0       A/P: 64yMale s/p R Knee I&D and poly exchange 11/22/19  - Stable  - Pain Control  - DVT ppx: Asa  - Post op abx: rifampin and vanco  - f/u vanc trough   - PT, WBS: WBAT    Ortho Pager [phone] Ortho Progress Note    Procedure: R knee I&D and Poly swap  Surgeon: Dr. Estes    Cultures NGTD  Pt comfortable without complaints, pain controlled  Denies CP, SOB, N/V, numbness/tingling     Vital Signs Last 24 Hrs  T(C): 37.2 (23 Nov 2019 04:47), Max: 37.6 (22 Nov 2019 19:00)  T(F): 99 (23 Nov 2019 04:47), Max: 99.6 (22 Nov 2019 19:00)  HR: 86 (23 Nov 2019 04:47) (67 - 88)  BP: 155/77 (23 Nov 2019 04:47) (96/51 - 155/77)  BP(mean): 97 (22 Nov 2019 21:15) (76 - 97)  RR: 17 (23 Nov 2019 04:47) (12 - 20)  SpO2: 97% (23 Nov 2019 04:47) (93% - 99%)      Gen:  comfortable in bed, in NAD  Prevena in place, holding suction  sensation intact to light touch lower extremities  DP 2+,  brisk capillary refill  EHL/FHL/TA/GS 5/5 bilaterally                                      11.0   8.22  )-----------( 285      ( 22 Nov 2019 10:50 )             37.0       A/P: 64yMale s/p R Knee I&D and poly exchange 11/22/19  - Stable  - Pain Control  - DVT ppx: Asa  - Post op abx: rifampin and vanco  - f/u vanc trough   - PT, WBS: WBAT    Ortho Pager [phone]

## 2019-11-23 NOTE — PROVIDER CONTACT NOTE (CHANGE IN STATUS NOTIFICATION) - ASSESSMENT
RLE with swelling toward quad and knee; pt verbalized 10/10 pain to extremity; denies numbness and tingling; no discoloration noted

## 2019-11-23 NOTE — PHYSICAL THERAPY INITIAL EVALUATION ADULT - PERTINENT HX OF CURRENT PROBLEM, REHAB EVAL
63 y/o male with PMHx significant for DMII, CHF, CAD s/p CABG, MI, RCA stent s/p R TKA on 10-21-19 presents from Dr Estes's office with evidence of right knee infection. s/p R Knee I&D and poly exchange 11/22/19

## 2019-11-23 NOTE — PHYSICAL THERAPY INITIAL EVALUATION ADULT - CRITERIA FOR SKILLED THERAPEUTIC INTERVENTIONS
functional limitations in following categories/predicted duration of therapy intervention/risk reduction/prevention/rehab potential/anticipated discharge recommendation/impairments found/therapy frequency/anticipated equipment needs at discharge

## 2019-11-23 NOTE — PROVIDER CONTACT NOTE (CHANGE IN STATUS NOTIFICATION) - SITUATION
R leg noted to have increased swelling and tightness; pt also verbalized increased pain to r lower extremity

## 2019-11-24 LAB
-  CEFAZOLIN: SIGNIFICANT CHANGE UP
-  CLINDAMYCIN: SIGNIFICANT CHANGE UP
-  DAPTOMYCIN: SIGNIFICANT CHANGE UP
-  ERYTHROMYCIN: SIGNIFICANT CHANGE UP
-  LINEZOLID: SIGNIFICANT CHANGE UP
-  OXACILLIN: SIGNIFICANT CHANGE UP
-  PENICILLIN: SIGNIFICANT CHANGE UP
-  RIFAMPIN: SIGNIFICANT CHANGE UP
-  TETRACYCLINE: SIGNIFICANT CHANGE UP
-  TRIMETHOPRIM/SULFAMETHOXAZOLE: SIGNIFICANT CHANGE UP
-  VANCOMYCIN: SIGNIFICANT CHANGE UP
-  VANCOMYCIN: SIGNIFICANT CHANGE UP
ANION GAP SERPL CALC-SCNC: 11 MMOL/L — SIGNIFICANT CHANGE UP (ref 5–17)
BUN SERPL-MCNC: 26 MG/DL — HIGH (ref 7–23)
CALCIUM SERPL-MCNC: 8.3 MG/DL — LOW (ref 8.4–10.5)
CHLORIDE SERPL-SCNC: 100 MMOL/L — SIGNIFICANT CHANGE UP (ref 96–108)
CO2 SERPL-SCNC: 20 MMOL/L — LOW (ref 22–31)
CREAT SERPL-MCNC: 1.41 MG/DL — HIGH (ref 0.5–1.3)
CULTURE RESULTS: SIGNIFICANT CHANGE UP
GLUCOSE BLDC GLUCOMTR-MCNC: 175 MG/DL — HIGH (ref 70–99)
GLUCOSE BLDC GLUCOMTR-MCNC: 180 MG/DL — HIGH (ref 70–99)
GLUCOSE BLDC GLUCOMTR-MCNC: 186 MG/DL — HIGH (ref 70–99)
GLUCOSE BLDC GLUCOMTR-MCNC: 71 MG/DL — SIGNIFICANT CHANGE UP (ref 70–99)
GLUCOSE BLDC GLUCOMTR-MCNC: 77 MG/DL — SIGNIFICANT CHANGE UP (ref 70–99)
GLUCOSE BLDC GLUCOMTR-MCNC: 95 MG/DL — SIGNIFICANT CHANGE UP (ref 70–99)
GLUCOSE SERPL-MCNC: 200 MG/DL — HIGH (ref 70–99)
HCT VFR BLD CALC: 37.4 % — LOW (ref 39–50)
HGB BLD-MCNC: 11.1 G/DL — LOW (ref 13–17)
MCHC RBC-ENTMCNC: 25.7 PG — LOW (ref 27–34)
MCHC RBC-ENTMCNC: 29.7 GM/DL — LOW (ref 32–36)
MCV RBC AUTO: 86.6 FL — SIGNIFICANT CHANGE UP (ref 80–100)
METHOD TYPE: SIGNIFICANT CHANGE UP
METHOD TYPE: SIGNIFICANT CHANGE UP
NRBC # BLD: 0 /100 WBCS — SIGNIFICANT CHANGE UP (ref 0–0)
ORGANISM # SPEC MICROSCOPIC CNT: SIGNIFICANT CHANGE UP
PLATELET # BLD AUTO: 269 K/UL — SIGNIFICANT CHANGE UP (ref 150–400)
POTASSIUM SERPL-MCNC: 4.9 MMOL/L — SIGNIFICANT CHANGE UP (ref 3.5–5.3)
POTASSIUM SERPL-SCNC: 4.9 MMOL/L — SIGNIFICANT CHANGE UP (ref 3.5–5.3)
RBC # BLD: 4.32 M/UL — SIGNIFICANT CHANGE UP (ref 4.2–5.8)
RBC # FLD: 18.7 % — HIGH (ref 10.3–14.5)
SODIUM SERPL-SCNC: 131 MMOL/L — LOW (ref 135–145)
SPECIMEN SOURCE: SIGNIFICANT CHANGE UP
VANCOMYCIN TROUGH SERPL-MCNC: 15.2 UG/ML — SIGNIFICANT CHANGE UP (ref 10–20)
WBC # BLD: 9.71 K/UL — SIGNIFICANT CHANGE UP (ref 3.8–10.5)
WBC # FLD AUTO: 9.71 K/UL — SIGNIFICANT CHANGE UP (ref 3.8–10.5)

## 2019-11-24 RX ORDER — INSULIN GLARGINE 100 [IU]/ML
48 INJECTION, SOLUTION SUBCUTANEOUS AT BEDTIME
Refills: 0 | Status: DISCONTINUED | OUTPATIENT
Start: 2019-11-24 | End: 2019-11-27

## 2019-11-24 RX ORDER — SODIUM CHLORIDE 9 MG/ML
1000 INJECTION INTRAMUSCULAR; INTRAVENOUS; SUBCUTANEOUS ONCE
Refills: 0 | Status: COMPLETED | OUTPATIENT
Start: 2019-11-24 | End: 2019-11-24

## 2019-11-24 RX ADMIN — Medication 2: at 14:41

## 2019-11-24 RX ADMIN — OXYCODONE HYDROCHLORIDE 10 MILLIGRAM(S): 5 TABLET ORAL at 05:45

## 2019-11-24 RX ADMIN — Medication 325 MILLIGRAM(S): at 12:04

## 2019-11-24 RX ADMIN — POLYETHYLENE GLYCOL 3350 17 GRAM(S): 17 POWDER, FOR SOLUTION ORAL at 12:04

## 2019-11-24 RX ADMIN — Medication 325 MILLIGRAM(S): at 17:15

## 2019-11-24 RX ADMIN — Medication 16 UNIT(S): at 14:41

## 2019-11-24 RX ADMIN — PANTOPRAZOLE SODIUM 40 MILLIGRAM(S): 20 TABLET, DELAYED RELEASE ORAL at 07:14

## 2019-11-24 RX ADMIN — Medication 1 MILLIGRAM(S): at 12:04

## 2019-11-24 RX ADMIN — INSULIN GLARGINE 48 UNIT(S): 100 INJECTION, SOLUTION SUBCUTANEOUS at 23:32

## 2019-11-24 RX ADMIN — Medication 250 MILLIGRAM(S): at 07:14

## 2019-11-24 RX ADMIN — OXYCODONE HYDROCHLORIDE 10 MILLIGRAM(S): 5 TABLET ORAL at 06:15

## 2019-11-24 RX ADMIN — Medication 500 MILLIGRAM(S): at 05:35

## 2019-11-24 RX ADMIN — Medication 16 UNIT(S): at 10:21

## 2019-11-24 RX ADMIN — Medication 100 MILLIGRAM(S): at 05:35

## 2019-11-24 RX ADMIN — Medication 1 TABLET(S): at 12:04

## 2019-11-24 RX ADMIN — Medication 81 MILLIGRAM(S): at 05:35

## 2019-11-24 RX ADMIN — Medication 2: at 10:21

## 2019-11-24 RX ADMIN — SODIUM CHLORIDE 1000 MILLILITER(S): 9 INJECTION INTRAMUSCULAR; INTRAVENOUS; SUBCUTANEOUS at 12:10

## 2019-11-24 RX ADMIN — Medication 650 MILLIGRAM(S): at 12:06

## 2019-11-24 RX ADMIN — Medication 250 MILLIGRAM(S): at 20:10

## 2019-11-24 RX ADMIN — ATORVASTATIN CALCIUM 40 MILLIGRAM(S): 80 TABLET, FILM COATED ORAL at 22:38

## 2019-11-24 RX ADMIN — OXYCODONE HYDROCHLORIDE 10 MILLIGRAM(S): 5 TABLET ORAL at 00:00

## 2019-11-24 RX ADMIN — Medication 500 MILLIGRAM(S): at 17:15

## 2019-11-24 RX ADMIN — Medication 650 MILLIGRAM(S): at 13:00

## 2019-11-24 RX ADMIN — Medication 325 MILLIGRAM(S): at 07:14

## 2019-11-24 RX ADMIN — Medication 81 MILLIGRAM(S): at 17:15

## 2019-11-24 RX ADMIN — PRASUGREL 10 MILLIGRAM(S): 5 TABLET, FILM COATED ORAL at 12:06

## 2019-11-24 NOTE — PROGRESS NOTE ADULT - SUBJECTIVE AND OBJECTIVE BOX
INTERVAL HPI/OVERNIGHT EVENTS:    Patient is a 64y old  Male who presents with a chief complaint of Right knee infection (24 Nov 2019 09:16)  remains on abx  eating well  no acute overnight events  +BM today  insulin administration reviewed.    Pt reports the following symptoms:    CONSTITUTIONAL:  Negative fever or chills, feels well, good appetite  EYES:  Negative  blurry vision or double vision  CARDIOVASCULAR:  Negative for chest pain or palpitations  RESPIRATORY:  Negative for cough, wheezing, or SOB   GASTROINTESTINAL:  Negative for nausea, vomiting, diarrhea, constipation, or abdominal pain  GENITOURINARY:  Negative frequency, urgency or dysuria  NEUROLOGIC:  No headache, confusion, dizziness, lightheadedness    MEDICATIONS  (STANDING):  ascorbic acid 500 milliGRAM(s) Oral two times a day  aspirin enteric coated 81 milliGRAM(s) Oral two times a day  atorvastatin 40 milliGRAM(s) Oral at bedtime  BUpivacaine liposome 1.3% Injectable (no eMAR) 20 milliLiter(s) Local Injection once  dextrose 5%. 1000 milliLiter(s) (50 mL/Hr) IV Continuous <Continuous>  dextrose 5%. 1000 milliLiter(s) (50 mL/Hr) IV Continuous <Continuous>  dextrose 50% Injectable 12.5 Gram(s) IV Push once  dextrose 50% Injectable 25 Gram(s) IV Push once  dextrose 50% Injectable 25 Gram(s) IV Push once  dextrose 50% Injectable 12.5 Gram(s) IV Push once  dextrose 50% Injectable 25 Gram(s) IV Push once  dextrose 50% Injectable 25 Gram(s) IV Push once  ferrous    sulfate 325 milliGRAM(s) Oral three times a day with meals  folic acid 1 milliGRAM(s) Oral daily  insulin glargine Injectable (LANTUS) 48 Unit(s) SubCutaneous at bedtime  insulin lispro (HumaLOG) corrective regimen sliding scale   SubCutaneous Before meals and at bedtime  insulin lispro Injectable (HumaLOG) 16 Unit(s) SubCutaneous three times a day before meals  lactated ringers. 1000 milliLiter(s) (80 mL/Hr) IV Continuous <Continuous>  metoprolol succinate  milliGRAM(s) Oral daily  multivitamin 1 Tablet(s) Oral daily  pantoprazole    Tablet 40 milliGRAM(s) Oral before breakfast  polyethylene glycol 3350 17 Gram(s) Oral daily  prasugrel 10 milliGRAM(s) Oral daily  rifAMPin IVPB 300 milliGRAM(s) IV Intermittent three times a day  vancomycin  IVPB 1000 milliGRAM(s) IV Intermittent every 12 hours    MEDICATIONS  (PRN):  acetaminophen   Tablet .. 650 milliGRAM(s) Oral every 6 hours PRN Mild Pain (1 - 3)  aluminum hydroxide/magnesium hydroxide/simethicone Suspension 30 milliLiter(s) Oral four times a day PRN Indigestion  bisacodyl Suppository 10 milliGRAM(s) Rectal daily PRN If no bowel movement by postoperative day #2  dextrose 40% Gel 15 Gram(s) Oral once PRN Blood Glucose LESS THAN 70 milliGRAM(s)/deciliter  dextrose 40% Gel 15 Gram(s) Oral once PRN Blood Glucose LESS THAN 70 milliGRAM(s)/deciliter  glucagon  Injectable 1 milliGRAM(s) IntraMuscular once PRN Glucose LESS THAN 70 milligrams/deciliter  glucagon  Injectable 1 milliGRAM(s) IntraMuscular once PRN Glucose LESS THAN 70 milligrams/deciliter  HYDROmorphone  Injectable 0.5 milliGRAM(s) IV Push every 4 hours PRN breakthrough pain  HYDROmorphone  Injectable 0.5 milliGRAM(s) IV Push every 15 minutes PRN pacu pain  magnesium hydroxide Suspension 30 milliLiter(s) Oral daily PRN Constipation  metoclopramide Injectable 10 milliGRAM(s) IV Push every 6 hours PRN Nausea and/or Vomiting  ondansetron Injectable 4 milliGRAM(s) IV Push every 6 hours PRN Nausea and/or Vomiting  oxyCODONE    IR 10 milliGRAM(s) Oral every 4 hours PRN Severe Pain (7 - 10)  oxyCODONE    IR 5 milliGRAM(s) Oral every 4 hours PRN Moderate Pain (4 - 6)  senna 2 Tablet(s) Oral at bedtime PRN Constipation      Past medical history reviewed  Family history reviewed  Social history reviewed    PHYSICAL EXAM  Vital Signs Last 24 Hrs  T(C): 36.8 (24 Nov 2019 20:48), Max: 37.1 (24 Nov 2019 00:13)  T(F): 98.2 (24 Nov 2019 20:48), Max: 98.8 (24 Nov 2019 00:13)  HR: 71 (24 Nov 2019 20:48) (55 - 84)  BP: 135/75 (24 Nov 2019 20:48) (88/53 - 135/75)  BP(mean): --  RR: 18 (24 Nov 2019 20:48) (16 - 19)  SpO2: 98% (24 Nov 2019 20:48) (62% - 98%)    Constitutional: wn/wd in NAD.   HEENT: NCAT, MMM, OP clear, EOMI, no proptosis or lid retraction  Neck: no thyromegaly or palpable thyroid nodules   Respiratory: lungs CTAB.  Cardiovascular: regular rhythm, normal S1 and S2, no audible murmurs, no peripheral edema  GI: soft, NT/ND, no masses/HSM appreciated.  Neurology: no tremors, DTR 2+  Skin: no visible rashes/lesions  Psychiatric: AAO x 3, normal affect/mood.    LABS:                        11.1   9.71  )-----------( 269      ( 24 Nov 2019 05:38 )             37.4     11-24    131<L>  |  100  |  26<H>  ----------------------------<  200<H>  4.9   |  20<L>  |  1.41<H>    Ca    8.3<L>      24 Nov 2019 05:38              HbA1C: 7.5 % (10-22 @ 07:00)    CAPILLARY BLOOD GLUCOSE      POCT Blood Glucose.: 77 mg/dL (24 Nov 2019 21:35)  POCT Blood Glucose.: 71 mg/dL (24 Nov 2019 18:31)  POCT Blood Glucose.: 175 mg/dL (24 Nov 2019 14:30)  POCT Blood Glucose.: 186 mg/dL (24 Nov 2019 10:16)  POCT Blood Glucose.: 180 mg/dL (24 Nov 2019 08:36)

## 2019-11-24 NOTE — PROGRESS NOTE ADULT - SUBJECTIVE AND OBJECTIVE BOX
Ortho Progress Note    Procedure: R knee I&D and Poly swap  Surgeon: Dr. Estes    Cultures growing staph auerus, susceptibilities pending  R knee pain overnight, controlled with medication  Pt comfortable without complaints, pain controlled  Denies CP, SOB, N/V, numbness/tingling     Vital Signs Last 24 Hrs  T(C): 36.7 (24 Nov 2019 08:30), Max: 37.3 (23 Nov 2019 09:42)  T(F): 98.1 (24 Nov 2019 08:30), Max: 99.2 (23 Nov 2019 09:42)  HR: 55 (24 Nov 2019 08:30) (55 - 83)  BP: 101/61 (24 Nov 2019 08:30) (89/56 - 124/78)  BP(mean): --  RR: 16 (24 Nov 2019 08:30) (16 - 17)  SpO2: 94% (24 Nov 2019 08:30) (93% - 96%)      Gen:  comfortable in bed, in NAD  Prevena in place, holding suction  sensation intact to light touch lower extremities  DP 2+,  brisk capillary refill  EHL/FHL/TA/GS 5/5 bilaterally                                      11.1   9.71  )-----------( 269      ( 24 Nov 2019 05:38 )             37.4       A/P: 64yMale s/p R Knee I&D and poly exchange 11/22/19  - Stable  - Pain Control  - DVT ppx: Asa  - Post op abx: rifampin and vanco  - f/u vanc trough   - PT, WBS: WBAT    Ortho Pager [phone]

## 2019-11-25 LAB
-  AMPICILLIN/SULBACTAM: SIGNIFICANT CHANGE UP
-  AMPICILLIN: SIGNIFICANT CHANGE UP
-  CEFAZOLIN: SIGNIFICANT CHANGE UP
-  CEFTRIAXONE: SIGNIFICANT CHANGE UP
-  CIPROFLOXACIN: SIGNIFICANT CHANGE UP
-  CLINDAMYCIN: SIGNIFICANT CHANGE UP
-  DAPTOMYCIN: SIGNIFICANT CHANGE UP
-  ERYTHROMYCIN: SIGNIFICANT CHANGE UP
-  GENTAMICIN: SIGNIFICANT CHANGE UP
-  LEVOFLOXACIN: SIGNIFICANT CHANGE UP
-  LINEZOLID: SIGNIFICANT CHANGE UP
-  MEROPENEM: SIGNIFICANT CHANGE UP
-  MOXIFLOXACIN(AEROBIC): SIGNIFICANT CHANGE UP
-  OXACILLIN: SIGNIFICANT CHANGE UP
-  PENICILLIN: SIGNIFICANT CHANGE UP
-  RIFAMPIN: SIGNIFICANT CHANGE UP
-  TETRACYCLINE: SIGNIFICANT CHANGE UP
-  TRIMETHOPRIM/SULFAMETHOXAZOLE: SIGNIFICANT CHANGE UP
-  VANCOMYCIN: SIGNIFICANT CHANGE UP
ANION GAP SERPL CALC-SCNC: 12 MMOL/L — SIGNIFICANT CHANGE UP (ref 5–17)
BUN SERPL-MCNC: 21 MG/DL — SIGNIFICANT CHANGE UP (ref 7–23)
CALCIUM SERPL-MCNC: 8.7 MG/DL — SIGNIFICANT CHANGE UP (ref 8.4–10.5)
CHLORIDE SERPL-SCNC: 101 MMOL/L — SIGNIFICANT CHANGE UP (ref 96–108)
CHOLEST SERPL-MCNC: 68 MG/DL — SIGNIFICANT CHANGE UP (ref 10–199)
CO2 SERPL-SCNC: 19 MMOL/L — LOW (ref 22–31)
CREAT SERPL-MCNC: 1.2 MG/DL — SIGNIFICANT CHANGE UP (ref 0.5–1.3)
CRP SERPL-MCNC: 11.18 MG/DL — HIGH (ref 0–0.4)
CULTURE RESULTS: SIGNIFICANT CHANGE UP
ERYTHROCYTE [SEDIMENTATION RATE] IN BLOOD: 19 MM/HR — SIGNIFICANT CHANGE UP
GLUCOSE BLDC GLUCOMTR-MCNC: 103 MG/DL — HIGH (ref 70–99)
GLUCOSE BLDC GLUCOMTR-MCNC: 170 MG/DL — HIGH (ref 70–99)
GLUCOSE BLDC GLUCOMTR-MCNC: 226 MG/DL — HIGH (ref 70–99)
GLUCOSE BLDC GLUCOMTR-MCNC: 233 MG/DL — HIGH (ref 70–99)
GLUCOSE BLDC GLUCOMTR-MCNC: 276 MG/DL — HIGH (ref 70–99)
GLUCOSE SERPL-MCNC: 102 MG/DL — HIGH (ref 70–99)
HCT VFR BLD CALC: 35.6 % — LOW (ref 39–50)
HDLC SERPL-MCNC: 23 MG/DL — LOW
HGB BLD-MCNC: 10.9 G/DL — LOW (ref 13–17)
LIPID PNL WITH DIRECT LDL SERPL: 35 MG/DL — SIGNIFICANT CHANGE UP
MCHC RBC-ENTMCNC: 25.9 PG — LOW (ref 27–34)
MCHC RBC-ENTMCNC: 30.6 GM/DL — LOW (ref 32–36)
MCV RBC AUTO: 84.6 FL — SIGNIFICANT CHANGE UP (ref 80–100)
METHOD TYPE: SIGNIFICANT CHANGE UP
NRBC # BLD: 0 /100 WBCS — SIGNIFICANT CHANGE UP (ref 0–0)
ORGANISM # SPEC MICROSCOPIC CNT: SIGNIFICANT CHANGE UP
PLATELET # BLD AUTO: 277 K/UL — SIGNIFICANT CHANGE UP (ref 150–400)
POTASSIUM SERPL-MCNC: 3.9 MMOL/L — SIGNIFICANT CHANGE UP (ref 3.5–5.3)
POTASSIUM SERPL-SCNC: 3.9 MMOL/L — SIGNIFICANT CHANGE UP (ref 3.5–5.3)
RBC # BLD: 4.21 M/UL — SIGNIFICANT CHANGE UP (ref 4.2–5.8)
RBC # FLD: 18.6 % — HIGH (ref 10.3–14.5)
SODIUM SERPL-SCNC: 132 MMOL/L — LOW (ref 135–145)
SPECIMEN SOURCE: SIGNIFICANT CHANGE UP
TOTAL CHOLESTEROL/HDL RATIO MEASUREMENT: 3 RATIO — LOW (ref 3.4–9.6)
TRIGL SERPL-MCNC: 52 MG/DL — SIGNIFICANT CHANGE UP (ref 10–149)
WBC # BLD: 10.37 K/UL — SIGNIFICANT CHANGE UP (ref 3.8–10.5)
WBC # FLD AUTO: 10.37 K/UL — SIGNIFICANT CHANGE UP (ref 3.8–10.5)

## 2019-11-25 PROCEDURE — 99232 SBSQ HOSP IP/OBS MODERATE 35: CPT

## 2019-11-25 RX ADMIN — Medication 1 TABLET(S): at 11:47

## 2019-11-25 RX ADMIN — ATORVASTATIN CALCIUM 40 MILLIGRAM(S): 80 TABLET, FILM COATED ORAL at 22:29

## 2019-11-25 RX ADMIN — Medication 81 MILLIGRAM(S): at 06:04

## 2019-11-25 RX ADMIN — Medication 100 MILLIGRAM(S): at 07:22

## 2019-11-25 RX ADMIN — Medication 500 MILLIGRAM(S): at 18:21

## 2019-11-25 RX ADMIN — Medication 16 UNIT(S): at 12:39

## 2019-11-25 RX ADMIN — OXYCODONE HYDROCHLORIDE 10 MILLIGRAM(S): 5 TABLET ORAL at 05:00

## 2019-11-25 RX ADMIN — PRASUGREL 10 MILLIGRAM(S): 5 TABLET, FILM COATED ORAL at 11:47

## 2019-11-25 RX ADMIN — OXYCODONE HYDROCHLORIDE 10 MILLIGRAM(S): 5 TABLET ORAL at 11:59

## 2019-11-25 RX ADMIN — INSULIN GLARGINE 48 UNIT(S): 100 INJECTION, SOLUTION SUBCUTANEOUS at 22:29

## 2019-11-25 RX ADMIN — Medication 325 MILLIGRAM(S): at 11:46

## 2019-11-25 RX ADMIN — Medication 500 MILLIGRAM(S): at 06:04

## 2019-11-25 RX ADMIN — Medication 6: at 17:27

## 2019-11-25 RX ADMIN — Medication 1 MILLIGRAM(S): at 11:46

## 2019-11-25 RX ADMIN — Medication 325 MILLIGRAM(S): at 07:23

## 2019-11-25 RX ADMIN — OXYCODONE HYDROCHLORIDE 10 MILLIGRAM(S): 5 TABLET ORAL at 16:15

## 2019-11-25 RX ADMIN — Medication 81 MILLIGRAM(S): at 18:21

## 2019-11-25 RX ADMIN — Medication 325 MILLIGRAM(S): at 18:21

## 2019-11-25 RX ADMIN — POLYETHYLENE GLYCOL 3350 17 GRAM(S): 17 POWDER, FOR SOLUTION ORAL at 11:47

## 2019-11-25 RX ADMIN — Medication 250 MILLIGRAM(S): at 07:24

## 2019-11-25 RX ADMIN — Medication 4: at 22:29

## 2019-11-25 RX ADMIN — HYDROMORPHONE HYDROCHLORIDE 0.5 MILLIGRAM(S): 2 INJECTION INTRAMUSCULAR; INTRAVENOUS; SUBCUTANEOUS at 06:05

## 2019-11-25 RX ADMIN — OXYCODONE HYDROCHLORIDE 10 MILLIGRAM(S): 5 TABLET ORAL at 11:46

## 2019-11-25 RX ADMIN — PANTOPRAZOLE SODIUM 40 MILLIGRAM(S): 20 TABLET, DELAYED RELEASE ORAL at 06:04

## 2019-11-25 RX ADMIN — OXYCODONE HYDROCHLORIDE 10 MILLIGRAM(S): 5 TABLET ORAL at 04:22

## 2019-11-25 RX ADMIN — Medication 2: at 12:39

## 2019-11-25 RX ADMIN — Medication 250 MILLIGRAM(S): at 18:28

## 2019-11-25 RX ADMIN — Medication 16 UNIT(S): at 17:27

## 2019-11-25 RX ADMIN — HYDROMORPHONE HYDROCHLORIDE 0.5 MILLIGRAM(S): 2 INJECTION INTRAMUSCULAR; INTRAVENOUS; SUBCUTANEOUS at 06:27

## 2019-11-25 NOTE — OCCUPATIONAL THERAPY INITIAL EVALUATION ADULT - PLANNED THERAPY INTERVENTIONS, OT EVAL
strengthening/ADL retraining/balance training/ROM/IADL retraining/fine motor coordination training/bed mobility training/transfer training

## 2019-11-25 NOTE — PROGRESS NOTE ADULT - SUBJECTIVE AND OBJECTIVE BOX
SUBJECTIVE: Patient seen and examined. Pain controlled.      OBJECTIVE:  NAD  Vital Signs Last 24 Hrs  T(C): 36.8 (25 Nov 2019 05:49), Max: 36.8 (24 Nov 2019 14:08)  T(F): 98.3 (25 Nov 2019 05:49), Max: 98.3 (24 Nov 2019 14:08)  HR: 85 (25 Nov 2019 05:49) (62 - 85)  BP: 134/68 (25 Nov 2019 05:49) (88/53 - 135/75)  BP(mean): --  RR: 18 (25 Nov 2019 05:49) (17 - 19)  SpO2: 94% (25 Nov 2019 05:49) (62% - 98%)    Affected extremity:          Prevena holding suction          Sensation: SILT         Motor exam: intact TA/GS/EHL         warm well perfused; capillary refill <3 seconds                         10.9   10.37 )-----------( 277      ( 25 Nov 2019 06:50 )             35.6     11-25    132<L>  |  101  |  21  ----------------------------<  102<H>  3.9   |  19<L>  |  1.20    Ca    8.7      25 Nov 2019 06:50      I&O's Detail    24 Nov 2019 07:01  -  25 Nov 2019 07:00  --------------------------------------------------------  IN:    Oral Fluid: 480 mL    Sodium Chloride 0.9% IV Bolus: 1000 mL    Solution: 100 mL  Total IN: 1580 mL    OUT:    Voided: 900 mL  Total OUT: 900 mL    Total NET: 680 mL        A/P :  Pt is a 63yo Male s/p  R knee I&D, poly exchange, 11/22  -    Pain control  -    DVT ppx: SCD/ASA  -    IV abx  -    f/u ID recs, cards recs, endo recs     -    Weight bearing status: WBAT   -    Physical Therapy  -    Dispo: Home    Noe Aquino MD  Senior Orthopaedic Resident  Orthopaedic Surgery

## 2019-11-25 NOTE — PROGRESS NOTE ADULT - SUBJECTIVE AND OBJECTIVE BOX
INTERVAL HPI/OVERNIGHT EVENTS:    Patient is a 64y old  Male who presents with a chief complaint of Right knee infection (25 Nov 2019 10:52)  Pt eating well  no acute overnight events  insulin administration reviewed  cultures positive for MRSA  denies nausea, vomiting, abdominal pain, SOB, chest pain palpitations, urinary sx, headache, dizziness, subjective fever, chills  pt is getting out of bed and walking with PT    Pt reports the following symptoms:    CONSTITUTIONAL:  Negative fever or chills, feels well, good appetite  EYES:  Negative  blurry vision or double vision  CARDIOVASCULAR:  Negative for chest pain or palpitations  RESPIRATORY:  Negative for cough, wheezing, or SOB   GASTROINTESTINAL:  Negative for nausea, vomiting, diarrhea, constipation, or abdominal pain  GENITOURINARY:  Negative frequency, urgency or dysuria  NEUROLOGIC:  No headache, confusion, dizziness, lightheadedness    MEDICATIONS  (STANDING):  ascorbic acid 500 milliGRAM(s) Oral two times a day  aspirin enteric coated 81 milliGRAM(s) Oral two times a day  atorvastatin 40 milliGRAM(s) Oral at bedtime  BUpivacaine liposome 1.3% Injectable (no eMAR) 20 milliLiter(s) Local Injection once  dextrose 5%. 1000 milliLiter(s) (50 mL/Hr) IV Continuous <Continuous>  dextrose 5%. 1000 milliLiter(s) (50 mL/Hr) IV Continuous <Continuous>  dextrose 50% Injectable 12.5 Gram(s) IV Push once  dextrose 50% Injectable 25 Gram(s) IV Push once  dextrose 50% Injectable 25 Gram(s) IV Push once  dextrose 50% Injectable 12.5 Gram(s) IV Push once  dextrose 50% Injectable 25 Gram(s) IV Push once  dextrose 50% Injectable 25 Gram(s) IV Push once  ferrous    sulfate 325 milliGRAM(s) Oral three times a day with meals  folic acid 1 milliGRAM(s) Oral daily  insulin glargine Injectable (LANTUS) 48 Unit(s) SubCutaneous at bedtime  insulin lispro (HumaLOG) corrective regimen sliding scale   SubCutaneous Before meals and at bedtime  insulin lispro Injectable (HumaLOG) 16 Unit(s) SubCutaneous three times a day before meals  lactated ringers. 1000 milliLiter(s) (80 mL/Hr) IV Continuous <Continuous>  metoprolol succinate  milliGRAM(s) Oral daily  multivitamin 1 Tablet(s) Oral daily  pantoprazole    Tablet 40 milliGRAM(s) Oral before breakfast  polyethylene glycol 3350 17 Gram(s) Oral daily  prasugrel 10 milliGRAM(s) Oral daily  rifAMPin IVPB 300 milliGRAM(s) IV Intermittent three times a day  vancomycin  IVPB 1000 milliGRAM(s) IV Intermittent every 12 hours    MEDICATIONS  (PRN):  acetaminophen   Tablet .. 650 milliGRAM(s) Oral every 6 hours PRN Mild Pain (1 - 3)  aluminum hydroxide/magnesium hydroxide/simethicone Suspension 30 milliLiter(s) Oral four times a day PRN Indigestion  bisacodyl Suppository 10 milliGRAM(s) Rectal daily PRN If no bowel movement by postoperative day #2  dextrose 40% Gel 15 Gram(s) Oral once PRN Blood Glucose LESS THAN 70 milliGRAM(s)/deciliter  dextrose 40% Gel 15 Gram(s) Oral once PRN Blood Glucose LESS THAN 70 milliGRAM(s)/deciliter  glucagon  Injectable 1 milliGRAM(s) IntraMuscular once PRN Glucose LESS THAN 70 milligrams/deciliter  glucagon  Injectable 1 milliGRAM(s) IntraMuscular once PRN Glucose LESS THAN 70 milligrams/deciliter  HYDROmorphone  Injectable 0.5 milliGRAM(s) IV Push every 4 hours PRN breakthrough pain  HYDROmorphone  Injectable 0.5 milliGRAM(s) IV Push every 15 minutes PRN pacu pain  magnesium hydroxide Suspension 30 milliLiter(s) Oral daily PRN Constipation  metoclopramide Injectable 10 milliGRAM(s) IV Push every 6 hours PRN Nausea and/or Vomiting  ondansetron Injectable 4 milliGRAM(s) IV Push every 6 hours PRN Nausea and/or Vomiting  oxyCODONE    IR 10 milliGRAM(s) Oral every 4 hours PRN Severe Pain (7 - 10)  oxyCODONE    IR 5 milliGRAM(s) Oral every 4 hours PRN Moderate Pain (4 - 6)  senna 2 Tablet(s) Oral at bedtime PRN Constipation      Past medical history reviewed  Family history reviewed  Social history reviewed    PHYSICAL EXAM  Vital Signs Last 24 Hrs  T(C): 37.1 (25 Nov 2019 14:33), Max: 38 (25 Nov 2019 09:13)  T(F): 98.7 (25 Nov 2019 14:33), Max: 100.4 (25 Nov 2019 09:13)  HR: 75 (25 Nov 2019 14:33) (71 - 85)  BP: 127/77 (25 Nov 2019 14:33) (116/64 - 135/75)  BP(mean): --  RR: 16 (25 Nov 2019 14:33) (16 - 18)  SpO2: 96% (25 Nov 2019 14:33) (94% - 98%)    Constitutional: wn/wd in NAD.   HEENT: NCAT, MMM, OP clear, EOMI, no proptosis or lid retraction  Neck: no thyromegaly or palpable thyroid nodules   Respiratory: lungs CTAB.  Cardiovascular: regular rhythm, normal S1 and S2, no audible murmurs, no peripheral edema  GI: soft, NT/ND, no masses/HSM appreciated.  Neurology: no tremors, DTR 2+  Skin: no visible rashes/lesions  Psychiatric: AAO x 3, normal affect/mood.    LABS:                        10.9   10.37 )-----------( 277      ( 25 Nov 2019 06:50 )             35.6     11-25    132<L>  |  101  |  21  ----------------------------<  102<H>  3.9   |  19<L>  |  1.20    Ca    8.7      25 Nov 2019 06:50              HbA1C: 7.5 % (10-22 @ 07:00)    CAPILLARY BLOOD GLUCOSE      POCT Blood Glucose.: 170 mg/dL (25 Nov 2019 12:04)  POCT Blood Glucose.: 103 mg/dL (25 Nov 2019 07:59)  POCT Blood Glucose.: 95 mg/dL (24 Nov 2019 23:23)  POCT Blood Glucose.: 77 mg/dL (24 Nov 2019 21:35)  POCT Blood Glucose.: 71 mg/dL (24 Nov 2019 18:31)

## 2019-11-25 NOTE — PROGRESS NOTE ADULT - SUBJECTIVE AND OBJECTIVE BOX
Orthopaedic Surgery Progress Note    Patient seen and examined. ANAHI. Patient states his prevena is giving him discomfort but is tolerable. Pain controlled. Denies CP, SOB, N/V, tactile fevers, calf pain. Prelim OR cultures s/p right knee I&D polyexchange yielding MRSA. ID following. Patient is on Vancomycin and Rifampin. Vanc trough appropriate level.      Vital Signs Last 24 Hrs  T(C): 38 (25 Nov 2019 09:13), Max: 38 (25 Nov 2019 09:13)  T(F): 100.4 (25 Nov 2019 09:13), Max: 100.4 (25 Nov 2019 09:13)  HR: 75 (25 Nov 2019 09:13) (62 - 85)  BP: 126/62 (25 Nov 2019 09:13) (88/53 - 135/75)  BP(mean): --  RR: 18 (25 Nov 2019 09:13) (17 - 19)  SpO2: 96% (25 Nov 2019 09:13) (62% - 98%)         CAPILLARY BLOOD GLUCOSE      POCT Blood Glucose.: 103 mg/dL (25 Nov 2019 07:59)  POCT Blood Glucose.: 95 mg/dL (24 Nov 2019 23:23)  POCT Blood Glucose.: 77 mg/dL (24 Nov 2019 21:35)  POCT Blood Glucose.: 71 mg/dL (24 Nov 2019 18:31)  POCT Blood Glucose.: 175 mg/dL (24 Nov 2019 14:30)                  Physical Exam:  Pt laying comfortably in bed, NAD.  Skin warm and well perfused, no visible erythema/ecchymoses.  Dressing C/D/I; prevena in place   EHL/FHL/TA/GS firing bilaterally  SLT in tact to distal bilateral lower extremities  DP pulses 2+  Calves soft and nontender to palpation     LABS                        10.9   10.37 )-----------( 277      ( 25 Nov 2019 06:50 )             35.6                                11-25    132<L>  |  101  |  21  ----------------------------<  102<H>  3.9   |  19<L>  |  1.20    Ca    8.7      25 Nov 2019 06:50            A/P: 64M POD #3 s/p right knee wash out and polyexchage, prelim cultures yielding MRSA     CONTINUE:        1. PT: WBAT in [place]  2. DVT prophylaxis: ASA 81 BID   3. Pain Control as needed   4. Dispo: Pending final cultures/ID recs  5. Appreciate ID, Endo, Cardiology/Med recs

## 2019-11-25 NOTE — PROGRESS NOTE ADULT - SUBJECTIVE AND OBJECTIVE BOX
INTERVAL HISTORY:  The patient had 15 minutes of dyspnea last night. He has not had any chest discomfort. He is having pain in his knee.	  Chart, [facility] medications and allergies reviewed    MEDICATIONS:  MEDICATIONS  (STANDING):  ascorbic acid 500 milliGRAM(s) Oral two times a day  aspirin enteric coated 81 milliGRAM(s) Oral two times a day  atorvastatin 40 milliGRAM(s) Oral at bedtime  BUpivacaine liposome 1.3% Injectable (no eMAR) 20 milliLiter(s) Local Injection once  dextrose 5%. 1000 milliLiter(s) (50 mL/Hr) IV Continuous <Continuous>  dextrose 5%. 1000 milliLiter(s) (50 mL/Hr) IV Continuous <Continuous>  dextrose 50% Injectable 12.5 Gram(s) IV Push once  dextrose 50% Injectable 25 Gram(s) IV Push once  dextrose 50% Injectable 25 Gram(s) IV Push once  dextrose 50% Injectable 12.5 Gram(s) IV Push once  dextrose 50% Injectable 25 Gram(s) IV Push once  dextrose 50% Injectable 25 Gram(s) IV Push once  ferrous    sulfate 325 milliGRAM(s) Oral three times a day with meals  folic acid 1 milliGRAM(s) Oral daily  insulin glargine Injectable (LANTUS) 48 Unit(s) SubCutaneous at bedtime  insulin lispro (HumaLOG) corrective regimen sliding scale   SubCutaneous Before meals and at bedtime  insulin lispro Injectable (HumaLOG) 16 Unit(s) SubCutaneous three times a day before meals  lactated ringers. 1000 milliLiter(s) (80 mL/Hr) IV Continuous <Continuous>  metoprolol succinate  milliGRAM(s) Oral daily  multivitamin 1 Tablet(s) Oral daily  pantoprazole    Tablet 40 milliGRAM(s) Oral before breakfast  polyethylene glycol 3350 17 Gram(s) Oral daily  prasugrel 10 milliGRAM(s) Oral daily  rifAMPin IVPB 300 milliGRAM(s) IV Intermittent three times a day  vancomycin  IVPB 1000 milliGRAM(s) IV Intermittent every 12 hours      REVIEW OF SYSTEMS:  CONSTITUTIONAL: No fever, no weight loss, no fatigue  PULMONARY: No cough, no wheezing, chills no hemoptysis; +Shortness of Breath  CARDIOVASCULAR: No chest pain, no palpitations, no syncope, dizziness, no leg swelling  GASTROINTESTINAL: No abdominal pain. No nausea, no  vomiting, no hematemesis; No diarrhea, no constipation. No melena, no hematochezia.  GENITOURINARY: No dysuria, no frequency, no hematuria, no incontinence  SKIN: No itching, no burning, no rashes, no lesions     PHYSICAL EXAM:  T(C): 36.8 (11-25-19 @ 05:49), Max: 36.8 (11-24-19 @ 14:08)  HR: 85 (11-25-19 @ 05:49) (55 - 85)  BP: 134/68 (11-25-19 @ 05:49) (88/53 - 135/75)  RR: 18 (11-25-19 @ 05:49) (16 - 19)  SpO2: 94% (11-25-19 @ 05:49) (62% - 98%)  Wt(kg): --  I&O's Summary    24 Nov 2019 07:01  -  25 Nov 2019 07:00  --------------------------------------------------------  IN: 1580 mL / OUT: 900 mL / NET: 680 mL        Appearance:  No deformities, normal appearance	  HEENT:   Normal oral mucosa, PERRL, EOMI	  Neck: No jvd , no masses  Lymphatic: No  lymphadenopathy  Pulmonary: Lungs clear to auscultation and percussion  Cardiovascular: Normal S1 S2, No JVD, No murmur, No edema	  Abdomen:  Soft, Non-tender, + BS  Skin: No rashes, No ecchymoses	  Extremities:  No clubbing or cyanosis  MSK: R knee bandaged w drain  LABS:		  CARDIAC MARKERS:                         10.9   10.37 )-----------( 277      ( 25 Nov 2019 06:50 )             35.6   11-24    131<L>  |  100  |  26<H>  ----------------------------<  200<H>  4.9   |  20<L>  |  1.41<H>    Ca    8.3<L>      24 Nov 2019 05:38      proBNP:  Lipid Profile:  HgA1c:  TSH:      Culture - Surgical Swab (collected 11-22-19 @ 19:16)  Source: .Surgical Swab Right knee superficial  Gram Stain (11-22-19 @ 21:10):    No organisms seen    Rare WBC's  Final Report (11-24-19 @ 11:38):    Few Methicillin resistant Staphylococcus aureus    Floor previously notified.  Organism: Methicillin resistant Staphylococcus aureus  Methicillin resistant Staphylococcus aureus (11-24-19 @ 11:38)  Organism: Methicillin resistant Staphylococcus aureus (11-24-19 @ 11:38)      -  Vancomycin: S 1.5      Method Type: ETEST  Organism: Methicillin resistant Staphylococcus aureus (11-24-19 @ 11:38)      -  Cefazolin: R 8      -  Clindamycin: S <=0.25      -  Daptomycin: S 0.5      -  Erythromycin: R >4      -  Linezolid: S 2      -  Oxacillin: R >2      -  Penicillin: R >8      -  RIF- Rifampin: S <=1 Should not be used as monotherapy      -  Tetra/Doxy: S <=1      -  Trimethoprim/Sulfamethoxazole: R >2/38      -  Vancomycin: S 1      Method Type: YOLIE    Culture - Tissue with Gram Stain (collected 11-22-19 @ 19:07)  Source: .Tissue lateral gutter  Gram Stain (11-22-19 @ 21:10):    No organisms seen    Rare WBC's  Preliminary Report (11-24-19 @ 09:36):    Few Staphylococcus aureus presumptive Methicillin resistant    Confirmation to follow within 24 hours    Result called to and read back by_ OMER Guerrier RN  11/24/2019 09:31:27    Susceptibility to follow.    Culture - Tissue with Gram Stain (collected 11-22-19 @ 19:07)  Source: .Tissue medial right knee gutter  Gram Stain (11-22-19 @ 21:09):    No organisms seen    No WBC's seen.  Preliminary Report (11-24-19 @ 09:31):    Rare Staphylococcus aureus presumptive Methicillin resistant    Confirmation to follow within 24 hours    Result called to and read back by_ OMER Guerrier RN  11/24/2019 09:31:27    Susceptibility to follow.    Culture - Tissue with Gram Stain (collected 11-22-19 @ 19:07)  Source: .Tissue interpatella fat pad  Gram Stain (11-22-19 @ 21:10):    No organisms seen    Rare WBC's  Preliminary Report (11-24-19 @ 09:39):    Rare Staphylococcus aureus presumptive Methicillin resistant    Confirmation to follow within 24 hours    Result called to and read back by_ D. Guerrier  RN  11/24/2019 09:31:27    Susceptibility to follow.    Culture - Blood (collected 11-22-19 @ 11:26)  Source: .Blood Blood-Peripheral  Preliminary Report (11-24-19 @ 12:00):    No growth at 2 days.    Culture - Blood (collected 11-22-19 @ 11:26)  Source: .Blood Blood-Peripheral  Preliminary Report (11-24-19 @ 12:00):    No growth at 2 days.    Culture - Body Fluid with Gram Stain (collected 11-20-19 @ 10:54)  Source: .Body Fluid synovial fluid  Gram Stain (11-20-19 @ 21:59):    No organisms seen    Moderate WBC's  Preliminary Report (11-21-19 @ 08:50):    No growth to date      TELEMETRY: 	      QTC     ECG:  	   QTC         RADIOLOGY:   DIAGNOSTIC TESTING: [ ] Echocardiogram: [ ]  Catheterization: [ ] Stress Test:      ASSESSMENT/PLAN: 	  Coronary artery disease/stent-the patient had a coronary stent one year ago. He has no chest pain. He is a very high clotting risk. Continue prasugrel and aspirin.  On statin and metoprolol.     Cardiomyopathy -no clinical congestive heart failure. However, his pulmonary pressure is very high. The patient had one episode of dyspnea but is now comfortable. Minimize fluids.     Ventricular arrhythmia-asymptomatic. Continue metoprolol. Follow up with electrophysiology as soon as possible after discharge.     Status post right knee replacement-as per Dr. Estes.

## 2019-11-25 NOTE — OCCUPATIONAL THERAPY INITIAL EVALUATION ADULT - PERTINENT HX OF CURRENT PROBLEM, REHAB EVAL
63 y/o male with PMHx significant for DMII, CHF, CAD s/p CABG, MI, RCA stent s/p R TKA on 10-21-19 presents from Dr Estes's office with evidence of right knee infection. s/p R Knee I&D and poly exchange 11/22/19.

## 2019-11-25 NOTE — CONSULT NOTE ADULT - SUBJECTIVE AND OBJECTIVE BOX
HPI:  63 y/o male with PMHx significant for DMII, CHF, CAD s/p CABG, MI, RCA stent s/p R TKA on 10-21-19 presents from Dr Estes's office with evidence of right knee infection. Patient relates he felt knee was "overstretched" at PT 1 week ago and noticed some opening of his incision. Patient was seen on outpatient basis by Dr Estes last week who expressed concern over redness and started the patient on oral abx Bactrim. Patient states redness has not improved and he returned to Dr Estes's office today who ultimately sent patient to ED for admission.    Incision and drainage of right knee with polyethylene liner exchange , irrigation and debridement, multiple OR culture grew MRSA. Patient staarted on Vancomycin and Rifampin on 11/22 and tolerates well.      PAST MEDICAL & SURGICAL HISTORY:  Diabetes  Blood clot due to device, implant, or graft: was on blood thinners  HLD (hyperlipidemia)  Chronic systolic CHF (congestive heart failure)  Osteoarthritis  Atherosclerosis of coronary artery: CAD (coronary artery disease)  Status post percutaneous transluminal coronary angioplasty: in 2012  S/P CABG x 1: 2018  Stented coronary artery: 10/18 heart attack  INFERIOR WALL MI  Other postprocedural status: Fixation hardware in foot LEFT        REVIEW OF SYSTEMS:    General: no weakness; no fevers, no chills  Skin/Breast: no rash  Respiratory and Thorax: no SOB, no cough  Cardiovascular:	No chest pain  Gastrointestinal:	 no nausea, vomiting , diarrhea  Genitourinary:	no dysuria, no difficulty urinating, no hematuria  Musculoskeletal:	no weakness, no joint swelling/pain  Neurological:no focal weakness/numbness  Endocrine: no polyuria, no polydipsia      ANTIBIOTICS:  MEDICATIONS  (STANDING):  ascorbic acid 500 milliGRAM(s) Oral two times a day  aspirin enteric coated 81 milliGRAM(s) Oral two times a day  atorvastatin 40 milliGRAM(s) Oral at bedtime  BUpivacaine liposome 1.3% Injectable (no eMAR) 20 milliLiter(s) Local Injection once  dextrose 5%. 1000 milliLiter(s) (50 mL/Hr) IV Continuous <Continuous>  dextrose 5%. 1000 milliLiter(s) (50 mL/Hr) IV Continuous <Continuous>  dextrose 50% Injectable 12.5 Gram(s) IV Push once  dextrose 50% Injectable 25 Gram(s) IV Push once  dextrose 50% Injectable 25 Gram(s) IV Push once  dextrose 50% Injectable 12.5 Gram(s) IV Push once  dextrose 50% Injectable 25 Gram(s) IV Push once  dextrose 50% Injectable 25 Gram(s) IV Push once  ferrous    sulfate 325 milliGRAM(s) Oral three times a day with meals  folic acid 1 milliGRAM(s) Oral daily  insulin glargine Injectable (LANTUS) 48 Unit(s) SubCutaneous at bedtime  insulin lispro (HumaLOG) corrective regimen sliding scale   SubCutaneous Before meals and at bedtime  insulin lispro Injectable (HumaLOG) 16 Unit(s) SubCutaneous three times a day before meals  lactated ringers. 1000 milliLiter(s) (80 mL/Hr) IV Continuous <Continuous>  metoprolol succinate  milliGRAM(s) Oral daily  multivitamin 1 Tablet(s) Oral daily  pantoprazole    Tablet 40 milliGRAM(s) Oral before breakfast  polyethylene glycol 3350 17 Gram(s) Oral daily  prasugrel 10 milliGRAM(s) Oral daily  rifAMPin IVPB 300 milliGRAM(s) IV Intermittent three times a day  vancomycin  IVPB 1000 milliGRAM(s) IV Intermittent every 12 hours    MEDICATIONS  (PRN):  acetaminophen   Tablet .. 650 milliGRAM(s) Oral every 6 hours PRN Mild Pain (1 - 3)  aluminum hydroxide/magnesium hydroxide/simethicone Suspension 30 milliLiter(s) Oral four times a day PRN Indigestion  bisacodyl Suppository 10 milliGRAM(s) Rectal daily PRN If no bowel movement by postoperative day #2  dextrose 40% Gel 15 Gram(s) Oral once PRN Blood Glucose LESS THAN 70 milliGRAM(s)/deciliter  dextrose 40% Gel 15 Gram(s) Oral once PRN Blood Glucose LESS THAN 70 milliGRAM(s)/deciliter  glucagon  Injectable 1 milliGRAM(s) IntraMuscular once PRN Glucose LESS THAN 70 milligrams/deciliter  glucagon  Injectable 1 milliGRAM(s) IntraMuscular once PRN Glucose LESS THAN 70 milligrams/deciliter  HYDROmorphone  Injectable 0.5 milliGRAM(s) IV Push every 4 hours PRN breakthrough pain  HYDROmorphone  Injectable 0.5 milliGRAM(s) IV Push every 15 minutes PRN pacu pain  magnesium hydroxide Suspension 30 milliLiter(s) Oral daily PRN Constipation  metoclopramide Injectable 10 milliGRAM(s) IV Push every 6 hours PRN Nausea and/or Vomiting  ondansetron Injectable 4 milliGRAM(s) IV Push every 6 hours PRN Nausea and/or Vomiting  oxyCODONE    IR 10 milliGRAM(s) Oral every 4 hours PRN Severe Pain (7 - 10)  oxyCODONE    IR 5 milliGRAM(s) Oral every 4 hours PRN Moderate Pain (4 - 6)  senna 2 Tablet(s) Oral at bedtime PRN Constipation      Allergies: ACE inhibitors (Hives), enalapril (Hives)      SOCIAL HISTORY: No ETOH, no smoking    FAMILY HISTORY:  Family history of heart disease (Mother)  Family history of heart disease (Mother)      Vital Signs Last 24 Hrs  T(C): 36.8 (25 Nov 2019 20:52), Max: 38 (25 Nov 2019 09:13)  T(F): 98.3 (25 Nov 2019 20:52), Max: 100.4 (25 Nov 2019 09:13)  HR: 73 (25 Nov 2019 20:52) (73 - 85)  BP: 130/79 (25 Nov 2019 20:52) (116/64 - 134/68)  RR: 17 (25 Nov 2019 20:52) (16 - 18)  SpO2: 99% (25 Nov 2019 20:52) (94% - 99%)    11-24-19 @ 07:01  -  11-25-19 @ 07:00  IN: 1580 mL / OUT: 900 mL / NET: 680 mL    11-25-19 @ 07:01  -  11-25-19 @ 21:36  IN: 480 mL / OUT: 750 mL / NET: -270 mL        PHYSICAL EXAM:  Constitutional:Well-developed, well nourished  Eyes:DAMARIS, EOMI  Ear/Nose/Throat: no oral lesion, no sinus tenderness on percussion	  Neck:no JVD, no lymphadenopathy, supple  Respiratory: CTA colton  Cardiovascular: S1S2 RRR, no murmurs  Gastrointestinal: soft, (+) BS, no HSM  Extremities: R knee Prevena dressing in place  Vascular: DP Pulse: right normal; left normal            LABS:  Sedimentation Rate, Erythrocyte (11.25.19 @ 06:50)    Sedimentation Rate, Erythrocyte: 19 mm/Hr    C-Reactive Protein, Serum (11.25.19 @ 06:50)    C-Reactive Protein, Serum: 11.18 mg/dL    Vancomycin Level, Trough (11.24.19 @ 19:24)    Vancomycin Level, Trough: 15.2:                         10.9   10.37 )-----------( 277      ( 25 Nov 2019 06:50 )             35.6     11-25    132<L>  |  101  |  21  ----------------------------<  102<H>  3.9   |  19<L>  |  1.20    Ca    8.7      25 Nov 2019 06:50        MICROBIOLOGY:  Culture - Surgical Swab (11.22.19 @ 19:16)    -  Vancomycin: S 1    -  Vancomycin: S 1.5    -  Erythromycin: R >4    -  Daptomycin: S 0.5    -  Clindamycin: S <=0.25    -  Cefazolin: R 8    Gram Stain:   No organisms seen  Rare WBC's    -  RIF- Rifampin: S <=1 Should not be used as monotherapy    -  Penicillin: R >8    -  Linezolid: S 2    -  Oxacillin: R >2    -  Trimethoprim/Sulfamethoxazole: R >2/38    -  Tetra/Doxy: S <=1    Specimen Source: .Surgical Swab Right knee superficial    Culture Results:   Few Methicillin resistant Staphylococcus aureus  Floor previously notified.    Organism Identification: Methicillin resistant Staphylococcus aureus  Methicillin resistant Staphylococcus aureus    Organism: Methicillin resistant Staphylococcus aureus    Organism: Methicillin resistant Staphylococcus aureus    Method Type: YOLIE    Method Type: ETEST      RADIOLOGY & ADDITIONAL STUDIES:  < from: Xray Knee 1 or 2 Views, Right (11.22.19 @ 19:23) >  EXAM:  XR KNEE-RIGHT-1 OR 2 VIEWS                          PROCEDURE DATE:  11/22/2019          INTERPRETATION:  Clinical History: Postop    2 Views of the right knee demonstrates patient to be status post right   knee replacement. Postoperative soft tissue changes noted. Osseous   structures in satisfactory anatomic alignment.    Impression: Status post right knee replacement

## 2019-11-25 NOTE — CONSULT NOTE ADULT - PROBLEM SELECTOR RECOMMENDATION 9
1) Continue Vancomycin 1gr IV q12h and Rifampin 300mg IV/PO q12h x 6 weeks  2) Check Vancomycin trough prior to discharge  3) Check LFTs prior to discharge , then CMP weekly  4) Prevena dressing per surgery  5) Ortho f/u  6) Check weekly CBC, CMP, ESR,  CRP and fax to my office [phone]

## 2019-11-25 NOTE — OCCUPATIONAL THERAPY INITIAL EVALUATION ADULT - GENERAL OBSERVATIONS, REHAB EVAL
Patient cleared for OT evaluation by NICANOR Jacobs. Patient received kirsten-richey, NAD, +IV, +bl sequential compression devices. KI donned prior to session.

## 2019-11-25 NOTE — CONSULT NOTE ADULT - ASSESSMENT
A/P 64y Male with hx of CAD, PCI/stent one year ago, DM well controlled , admitted for R knee surgical wound infection, s/p TKR 10/21, s/p I&D, Poly exchange , OR culture (+) MRSA

## 2019-11-26 ENCOUNTER — TRANSCRIPTION ENCOUNTER (OUTPATIENT)
Age: 64
End: 2019-11-26

## 2019-11-26 ENCOUNTER — APPOINTMENT (OUTPATIENT)
Dept: ORTHOPEDIC SURGERY | Facility: CLINIC | Age: 64
End: 2019-11-26

## 2019-11-26 VITALS
OXYGEN SATURATION: 98 % | TEMPERATURE: 98 F | RESPIRATION RATE: 18 BRPM | HEART RATE: 83 BPM | SYSTOLIC BLOOD PRESSURE: 107 MMHG | DIASTOLIC BLOOD PRESSURE: 63 MMHG

## 2019-11-26 LAB
ALBUMIN SERPL ELPH-MCNC: 3 G/DL — LOW (ref 3.3–5)
ALP SERPL-CCNC: 84 U/L — SIGNIFICANT CHANGE UP (ref 40–120)
ALP SERPL-CCNC: 88 U/L — SIGNIFICANT CHANGE UP (ref 40–120)
ALP SERPL-CCNC: 94 U/L — SIGNIFICANT CHANGE UP (ref 40–120)
ALT FLD-CCNC: 250 U/L — HIGH (ref 10–45)
ALT FLD-CCNC: 270 U/L — HIGH (ref 10–45)
ALT FLD-CCNC: 295 U/L — HIGH (ref 10–45)
ANION GAP SERPL CALC-SCNC: 9 MMOL/L — SIGNIFICANT CHANGE UP (ref 5–17)
AST SERPL-CCNC: 120 U/L — HIGH (ref 10–40)
AST SERPL-CCNC: 156 U/L — HIGH (ref 10–40)
AST SERPL-CCNC: 170 U/L — HIGH (ref 10–40)
BILIRUB DIRECT SERPL-MCNC: 0.9 MG/DL — HIGH (ref 0–0.2)
BILIRUB DIRECT SERPL-MCNC: 0.9 MG/DL — HIGH (ref 0–0.2)
BILIRUB DIRECT SERPL-MCNC: 1.2 MG/DL — HIGH (ref 0–0.2)
BILIRUB INDIRECT FLD-MCNC: 1.1 MG/DL — HIGH (ref 0.2–1)
BILIRUB INDIRECT FLD-MCNC: 1.6 MG/DL — HIGH (ref 0.2–1)
BILIRUB INDIRECT FLD-MCNC: 1.7 MG/DL — HIGH (ref 0.2–1)
BILIRUB SERPL-MCNC: 2.3 MG/DL — HIGH (ref 0.2–1.2)
BILIRUB SERPL-MCNC: 2.5 MG/DL — HIGH (ref 0.2–1.2)
BILIRUB SERPL-MCNC: 2.6 MG/DL — HIGH (ref 0.2–1.2)
BUN SERPL-MCNC: 15 MG/DL — SIGNIFICANT CHANGE UP (ref 7–23)
CALCIUM SERPL-MCNC: 8.6 MG/DL — SIGNIFICANT CHANGE UP (ref 8.4–10.5)
CHLORIDE SERPL-SCNC: 106 MMOL/L — SIGNIFICANT CHANGE UP (ref 96–108)
CO2 SERPL-SCNC: 22 MMOL/L — SIGNIFICANT CHANGE UP (ref 22–31)
CREAT SERPL-MCNC: 0.97 MG/DL — SIGNIFICANT CHANGE UP (ref 0.5–1.3)
CRP SERPL-MCNC: 11.19 MG/DL — HIGH (ref 0–0.4)
ERYTHROCYTE [SEDIMENTATION RATE] IN BLOOD: 22 MM/HR — HIGH
GLUCOSE BLDC GLUCOMTR-MCNC: 114 MG/DL — HIGH (ref 70–99)
GLUCOSE BLDC GLUCOMTR-MCNC: 137 MG/DL — HIGH (ref 70–99)
GLUCOSE BLDC GLUCOMTR-MCNC: 137 MG/DL — HIGH (ref 70–99)
GLUCOSE BLDC GLUCOMTR-MCNC: 83 MG/DL — SIGNIFICANT CHANGE UP (ref 70–99)
GLUCOSE SERPL-MCNC: 90 MG/DL — SIGNIFICANT CHANGE UP (ref 70–99)
HCT VFR BLD CALC: 36.6 % — LOW (ref 39–50)
HGB BLD-MCNC: 11.6 G/DL — LOW (ref 13–17)
MCHC RBC-ENTMCNC: 26.1 PG — LOW (ref 27–34)
MCHC RBC-ENTMCNC: 31.7 GM/DL — LOW (ref 32–36)
MCV RBC AUTO: 82.2 FL — SIGNIFICANT CHANGE UP (ref 80–100)
NRBC # BLD: 0 /100 WBCS — SIGNIFICANT CHANGE UP (ref 0–0)
PLATELET # BLD AUTO: 298 K/UL — SIGNIFICANT CHANGE UP (ref 150–400)
POTASSIUM SERPL-MCNC: 3.8 MMOL/L — SIGNIFICANT CHANGE UP (ref 3.5–5.3)
POTASSIUM SERPL-SCNC: 3.8 MMOL/L — SIGNIFICANT CHANGE UP (ref 3.5–5.3)
PROT SERPL-MCNC: 5.7 G/DL — LOW (ref 6–8.3)
PROT SERPL-MCNC: 6.3 G/DL — SIGNIFICANT CHANGE UP (ref 6–8.3)
PROT SERPL-MCNC: 6.3 G/DL — SIGNIFICANT CHANGE UP (ref 6–8.3)
RBC # BLD: 4.45 M/UL — SIGNIFICANT CHANGE UP (ref 4.2–5.8)
RBC # FLD: 18.7 % — HIGH (ref 10.3–14.5)
SODIUM SERPL-SCNC: 137 MMOL/L — SIGNIFICANT CHANGE UP (ref 135–145)
VANCOMYCIN TROUGH SERPL-MCNC: 17.1 UG/ML — SIGNIFICANT CHANGE UP (ref 10–20)
WBC # BLD: 10.56 K/UL — HIGH (ref 3.8–10.5)
WBC # FLD AUTO: 10.56 K/UL — HIGH (ref 3.8–10.5)

## 2019-11-26 PROCEDURE — 76937 US GUIDE VASCULAR ACCESS: CPT | Mod: 26,59

## 2019-11-26 PROCEDURE — 99232 SBSQ HOSP IP/OBS MODERATE 35: CPT

## 2019-11-26 PROCEDURE — 36569 INSJ PICC 5 YR+ W/O IMAGING: CPT

## 2019-11-26 RX ORDER — OMEGA-3 ACID ETHYL ESTERS 1 G
1 CAPSULE ORAL
Qty: 0 | Refills: 0 | DISCHARGE

## 2019-11-26 RX ORDER — SODIUM CHLORIDE 9 MG/ML
10 INJECTION INTRAMUSCULAR; INTRAVENOUS; SUBCUTANEOUS
Refills: 0 | Status: DISCONTINUED | OUTPATIENT
Start: 2019-11-26 | End: 2019-11-27

## 2019-11-26 RX ORDER — ASPIRIN/CALCIUM CARB/MAGNESIUM 324 MG
1 TABLET ORAL
Qty: 0 | Refills: 0 | DISCHARGE
Start: 2019-11-26

## 2019-11-26 RX ORDER — INSULIN HUMAN 100 [IU]/ML
0 INJECTION, SOLUTION SUBCUTANEOUS
Qty: 0 | Refills: 0 | DISCHARGE

## 2019-11-26 RX ORDER — ASPIRIN/CALCIUM CARB/MAGNESIUM 324 MG
1 TABLET ORAL
Qty: 56 | Refills: 0
Start: 2019-11-26 | End: 2019-12-23

## 2019-11-26 RX ORDER — CHLORHEXIDINE GLUCONATE 213 G/1000ML
1 SOLUTION TOPICAL
Refills: 0 | Status: DISCONTINUED | OUTPATIENT
Start: 2019-11-27 | End: 2019-11-27

## 2019-11-26 RX ADMIN — Medication 1 MILLIGRAM(S): at 11:25

## 2019-11-26 RX ADMIN — Medication 16 UNIT(S): at 09:31

## 2019-11-26 RX ADMIN — Medication 16 UNIT(S): at 17:37

## 2019-11-26 RX ADMIN — Medication 1 TABLET(S): at 11:25

## 2019-11-26 RX ADMIN — OXYCODONE HYDROCHLORIDE 10 MILLIGRAM(S): 5 TABLET ORAL at 03:25

## 2019-11-26 RX ADMIN — Medication 325 MILLIGRAM(S): at 17:37

## 2019-11-26 RX ADMIN — POLYETHYLENE GLYCOL 3350 17 GRAM(S): 17 POWDER, FOR SOLUTION ORAL at 11:25

## 2019-11-26 RX ADMIN — OXYCODONE HYDROCHLORIDE 10 MILLIGRAM(S): 5 TABLET ORAL at 07:27

## 2019-11-26 RX ADMIN — Medication 500 MILLIGRAM(S): at 17:37

## 2019-11-26 RX ADMIN — Medication 325 MILLIGRAM(S): at 13:02

## 2019-11-26 RX ADMIN — PANTOPRAZOLE SODIUM 40 MILLIGRAM(S): 20 TABLET, DELAYED RELEASE ORAL at 05:56

## 2019-11-26 RX ADMIN — INSULIN GLARGINE 48 UNIT(S): 100 INJECTION, SOLUTION SUBCUTANEOUS at 21:39

## 2019-11-26 RX ADMIN — ATORVASTATIN CALCIUM 40 MILLIGRAM(S): 80 TABLET, FILM COATED ORAL at 21:39

## 2019-11-26 RX ADMIN — PRASUGREL 10 MILLIGRAM(S): 5 TABLET, FILM COATED ORAL at 11:25

## 2019-11-26 RX ADMIN — Medication 81 MILLIGRAM(S): at 17:37

## 2019-11-26 RX ADMIN — Medication 81 MILLIGRAM(S): at 05:56

## 2019-11-26 RX ADMIN — Medication 500 MILLIGRAM(S): at 05:56

## 2019-11-26 RX ADMIN — Medication 250 MILLIGRAM(S): at 22:59

## 2019-11-26 RX ADMIN — Medication 250 MILLIGRAM(S): at 11:25

## 2019-11-26 RX ADMIN — Medication 16 UNIT(S): at 13:02

## 2019-11-26 RX ADMIN — Medication 100 MILLIGRAM(S): at 05:56

## 2019-11-26 RX ADMIN — Medication 325 MILLIGRAM(S): at 07:28

## 2019-11-26 NOTE — PROGRESS NOTE ADULT - SUBJECTIVE AND OBJECTIVE BOX
SUBJECTIVE: Patient seen and examined. Pain controlled.      OBJECTIVE:  NAD  Vital Signs Last 24 Hrs  T(C): 36.1 (26 Nov 2019 05:58), Max: 38 (25 Nov 2019 09:13)  T(F): 97 (26 Nov 2019 05:58), Max: 100.4 (25 Nov 2019 09:13)  HR: 83 (26 Nov 2019 05:58) (73 - 83)  BP: 136/83 (26 Nov 2019 05:58) (116/64 - 136/83)  BP(mean): --  RR: 17 (26 Nov 2019 05:58) (16 - 18)  SpO2: 97% (26 Nov 2019 05:58) (96% - 99%)    Affected extremity:          Prevena holding suction          Sensation: SILT         Motor exam: intact TA/GS/EHL         warm well perfused; capillary refill <3 seconds                                             10.9   10.37 )-----------( 277      ( 25 Nov 2019 06:50 )             35.6       A/P :  Pt is a 65yo Male s/p  R knee I&D, poly exchange, 11/22  -    Pain control  -    DVT ppx: SCD/ASA  -    IV abx- rifampin and vanco x 6 weeks  -    f/u ID recs, cards recs, endo recs     -    Weight bearing status: WBAT   -    Physical Therapy  -    Dispo: Home    Noe Aquino MD  Senior Orthopaedic Resident  Orthopaedic Surgery

## 2019-11-26 NOTE — DISCHARGE NOTE NURSING/CASE MANAGEMENT/SOCIAL WORK - PATIENT PORTAL LINK FT
You can access the FollowMyHealth Patient Portal offered by [facility] by registering at the following website: [email or web address]. By joining [employer]’s FollowMyHealth portal, you will also be able to view your health information using other applications (apps) compatible with our system.

## 2019-11-26 NOTE — PROCEDURE NOTE - NSANESTHESIA_GEN_A_CORE
----- Message from Valentina Ramos sent at 3/16/2017  1:53 PM CDT -----  Contact: ROBBIE SUTTON [[id number]]  _x  1st Request  _  2nd Request  _  3rd Request        Who: ROBBIE SUTTON [[id number]]    Why: patient states she was discharged from ER on yesterday due to ovarian cyst and was told to follow up with her doctor in one day. Patient would like to be seen as soon as possible     What Number to Call Back: [phone]    When to Expect a call back: (Before the end of the day)   -- if call after 3:00 call back will be tomorrow.   1% lidocaine

## 2019-11-26 NOTE — DIETITIAN INITIAL EVALUATION ADULT. - ENERGY NEEDS
Height: 73" Weight: 234.5lbs, IBW 184lbs+/-10%, %%, BMI 31.0 kg/m2  IBW used for calculations as pt >120% of IBW. Needs adjusted for obesity and post op.

## 2019-11-26 NOTE — DISCHARGE NOTE PROVIDER - CARE PROVIDER_API CALL
Santos Estes)  Orthopaedic Surgery  [address] [place] [address]  Phone: [phone]  Fax: [phone]  Follow Up Time: 2 weeks    Zia Saeed)  Infectious Disease; Internal Medicine  [address], [address] [place] [address]  Phone: [phone]  Fax: [phone]  Follow Up Time: 2 weeks    Kasey Villatoro; PhD)  Internal Medicine  [address], [place] [address]  Phone: [phone]  Fax: [phone]  Follow Up Time: 2 weeks

## 2019-11-26 NOTE — DISCHARGE NOTE PROVIDER - NSDCFUADDINST_GEN_ALL_CORE_FT
For blood clot prevention take Aspirin 81mg twice daily along with your Effient for 1 month. After 1 month, you can go back to you effient + once daily aspirin 81mg.    Weight bear as tolerated with assistive device. Keep knee in immobilizer at all times.  No strenuous activity, heavy lifting, driving or returning to work until cleared by MD.  You may take showers. Keep the battery pack dry.   No soaking in bathtubs.  The dressing has a battery that usually dies in 7 days. Once this occurs, you may remove the dressing and dispose of it, then leave incision open to air. Keep incision clean and dry.    Try to have regular bowel movements, take stool softener or laxative if necessary.  Swelling may travel all the way down leg to foot, this is normal and will subside in a few weeks.  Call to schedule an appt with Dr. Estes for follow up, if you have staples or sutures they will be removed in office.  Contact your doctor if you experience: fever greater than 101.5, chills, chest pain, difficulty breathing, redness or excessive drainage around the incision, other concerns.  Follow up with your primary care provider. For blood clot prevention take Aspirin 81mg twice daily along with your Effient for 1 month. After 1 month, you can go back to you effient + once daily aspirin 81mg.    You are on vancomycin 1g IV every 12 hours to treat your MRSA infection. It was also recommended you take rifampin 300mg by mouth twice daily for this infection. Your liver function tests were elevated, which is likely related to this medication. You should not take this medication at this time. It is recommended you have your hepatic function panel (liver function tests) rechecked in a few days. Once your liver function normalizes, you will be able to start the rifampin 300mg twice daily. Confirm when to start the rifampin with Dr. Saeed, who will be following your labs that are drawn at home.      Weight bear as tolerated with assistive device. Keep knee in immobilizer at all times.  No strenuous activity, heavy lifting, driving or returning to work until cleared by MD.  You may take showers. Keep the battery pack dry.   No soaking in bathtubs.  The dressing has a battery that usually dies in 7 days. Once this occurs, you may remove the dressing and dispose of it, then leave incision open to air. Keep incision clean and dry.    Try to have regular bowel movements, take stool softener or laxative if necessary.  Swelling may travel all the way down leg to foot, this is normal and will subside in a few weeks.  Call to schedule an appt with Dr. Estes for follow up, if you have staples or sutures they will be removed in office.  Contact your doctor if you experience: fever greater than 101.5, chills, chest pain, difficulty breathing, redness or excessive drainage around the incision, other concerns.  Follow up with your primary care provider. For blood clot prevention take Aspirin 81mg twice daily along with your Effient for 1 month. After 1 month, you can go back to you effient + once daily aspirin 81mg.    You are on vancomycin 1g IV every 12 hours to treat your MRSA infection. It was also recommended you take rifampin 300mg by mouth twice daily for this infection. Your liver function tests were elevated, which is likely related to this medication. You should not take this medication at this time. It is recommended you have your hepatic function panel (liver function tests) rechecked in a few days. Once your liver function normalizes, you will be able to start the rifampin 300mg twice daily. Confirm when to start the rifampin by calling Dr. Saeed, who will be following your labs that are drawn at home.    Additionally, if your liver function tests do not normalize while off of rifampin, you may need to stop your Zetia and Crestor, which may also be toxic to your liver. Please follow-up with your primary care provider within the next couple of weeks. Avoid alcohol.      Weight bear as tolerated with assistive device. Keep knee in immobilizer at all times.  No strenuous activity, heavy lifting, driving or returning to work until cleared by MD.  You may take showers. Keep the battery pack dry.   No soaking in bathtubs.  The dressing has a battery that usually dies in 7 days. Once this occurs, you may remove the dressing and dispose of it, then leave incision open to air. Keep incision clean and dry.    Try to have regular bowel movements, take stool softener or laxative if necessary.  Swelling may travel all the way down leg to foot, this is normal and will subside in a few weeks.  Call to schedule an appt with Dr. Estes for follow up, if you have staples or sutures they will be removed in office.  Contact your doctor if you experience: fever greater than 101.5, chills, chest pain, difficulty breathing, redness or excessive drainage around the incision, other concerns.  Follow up with your primary care provider.

## 2019-11-26 NOTE — PROCEDURE NOTE - NSPOSTCAREGUIDE_GEN_A_CORE
Care for catheter as per unit/ICU protocols/Instructed patient/caregiver regarding signs and symptoms of infection/Verbal/written post procedure instructions were given to patient/caregiver

## 2019-11-26 NOTE — DIETITIAN INITIAL EVALUATION ADULT. - OTHER INFO
64M with hx of DMII, CHF, CAD s/p CABG, MI, recent dx of celiac disease with associated diarrhea, RCA stent s/p R TKA 10/21 presenting from outpt MD with right knee infection. S/p wound irrigation and debridement 11/22 OR culture grew MRSA and pt started on abx. s/p PICC line placement 11/26 for long term abx. Pt now planned for d/c.  No changes in UBW of 235lbs which is consistent with admit weight of 234.5lbs. Notes good appetite at home. NKFA, follows DM and gluten free diet- well educated on diets/ declines further edu. Pt currently on CST CHO diet with 1200 ml fluid restriction tolerating PO well- rec add gluten free to current diet order per hx of celiac- disc with team. GI: WDL, last BM 10/25. Skin: surgical incision, arelis score 20. Pain: none noted at this time. Please see recommendations below- disc with team. RD to follow up per protocol.

## 2019-11-26 NOTE — CONSULT NOTE ADULT - SUBJECTIVE AND OBJECTIVE BOX
V[facility] Access Service Consult Note    [facility]OB[facility]M [facility]x:  Blood clot due to device, implant, or graft: Blood clot due to device, implant, or graft  Atherosclerosis of coronary artery: Atherosclerosis of coronary artery  Status post percutaneous transluminal coronary angioplasty: Status post percutaneous transluminal coronary angioplasty  Chronic systolic CHF (congestive heart failure): Chronic systolic CHF (congestive heart failure)  HLD (hyperlipidemia): HLD (hyperlipidemia)  Diabetes: Diabetes  Infection of superficial incisional surgical site after procedure, initial encounter: Infection of superficial incisional surgical site after procedure, initial encounter             Diagnosis: infected knee    Indications for Vascular Access (Check all that apply)  [ X ]  Antibiotic Therapy       Antibiotic Prescribed:   vanco                                                                          Expected Duration of Therapy:  6 weeks             [  ]  IV Hydration  [  ]  Total Parenteral Nutrition  [  ]  Chemotherapy  [  ]  Difficult Venous Access  [  ]  CVP monitoring  [  ]  Medications with high potential for tissue necrosis on extravasation  [  ]  Other    Screening (Check all that apply)  Previous Radiation to chest  [  ] Yes      [X  ]  No  Breast Cancer                          [  ] Left     [  ]  Right    [X]  No  Pacemaker or ICD                   [  ] Left     [  ]  Right    [ X ]  No  Upper Extremity DVT             [  ] Left     [  ]  Right    [ X ]  No  Chronic Kidney Disease         [  ]  Yes     [  X]  No  Hemodialysis                           [  ]  Yes     [X  ]  No  AV Fistula/ Graft                     [  ]  Left    [  ]  Right    [ X ]  No  Temp>101F in past 24 H       [  ]  Yes     [ X ]  No  H/O PICC/Midline                   [ X ]  Yes     [  ]  No    Lab data:                        11.6   10.56 )-----------( 298      ( 26 Nov 2019 06:43 )             36.6     11-26    137  |  106  |  15  ----------------------------<  90  3.8   |  22  |  0.97    Ca    8.6      26 Nov 2019 06:43                I have reviewed the chart, interviewed and examined the patient and determined that this patient:  [ X ] Is a candidate for a PICC line  [  ] Is a candidate for a Midline  [  ] Is not a candidate for vascular access device (reason)    Lumens:    [X  ] Single  [  ] Double

## 2019-11-26 NOTE — PROGRESS NOTE ADULT - SUBJECTIVE AND OBJECTIVE BOX
INTERVAL HPI/OVERNIGHT EVENTS:    Patient is a 64y old  Male who presents with a chief complaint of Right knee infection (26 Nov 2019 18:28)  no acute overnight events  pt planned for DC home today  eating well  insulin administration reviewed      Pt reports the following symptoms:    CONSTITUTIONAL:  Negative fever or chills, feels well, good appetite  EYES:  Negative  blurry vision or double vision  CARDIOVASCULAR:  Negative for chest pain or palpitations  RESPIRATORY:  Negative for cough, wheezing, or SOB   GASTROINTESTINAL:  Negative for nausea, vomiting, diarrhea, constipation, or abdominal pain  GENITOURINARY:  Negative frequency, urgency or dysuria  NEUROLOGIC:  No headache, confusion, dizziness, lightheadedness    MEDICATIONS  (STANDING):  ascorbic acid 500 milliGRAM(s) Oral two times a day  aspirin enteric coated 81 milliGRAM(s) Oral two times a day  atorvastatin 40 milliGRAM(s) Oral at bedtime  BUpivacaine liposome 1.3% Injectable (no eMAR) 20 milliLiter(s) Local Injection once  dextrose 5%. 1000 milliLiter(s) (50 mL/Hr) IV Continuous <Continuous>  dextrose 5%. 1000 milliLiter(s) (50 mL/Hr) IV Continuous <Continuous>  dextrose 50% Injectable 12.5 Gram(s) IV Push once  dextrose 50% Injectable 25 Gram(s) IV Push once  dextrose 50% Injectable 25 Gram(s) IV Push once  dextrose 50% Injectable 12.5 Gram(s) IV Push once  dextrose 50% Injectable 25 Gram(s) IV Push once  dextrose 50% Injectable 25 Gram(s) IV Push once  ferrous    sulfate 325 milliGRAM(s) Oral three times a day with meals  folic acid 1 milliGRAM(s) Oral daily  insulin glargine Injectable (LANTUS) 48 Unit(s) SubCutaneous at bedtime  insulin lispro (HumaLOG) corrective regimen sliding scale   SubCutaneous Before meals and at bedtime  insulin lispro Injectable (HumaLOG) 16 Unit(s) SubCutaneous three times a day before meals  lactated ringers. 1000 milliLiter(s) (80 mL/Hr) IV Continuous <Continuous>  metoprolol succinate  milliGRAM(s) Oral daily  multivitamin 1 Tablet(s) Oral daily  pantoprazole    Tablet 40 milliGRAM(s) Oral before breakfast  polyethylene glycol 3350 17 Gram(s) Oral daily  prasugrel 10 milliGRAM(s) Oral daily  vancomycin  IVPB 1000 milliGRAM(s) IV Intermittent every 12 hours    MEDICATIONS  (PRN):  acetaminophen   Tablet .. 650 milliGRAM(s) Oral every 6 hours PRN Mild Pain (1 - 3)  aluminum hydroxide/magnesium hydroxide/simethicone Suspension 30 milliLiter(s) Oral four times a day PRN Indigestion  bisacodyl Suppository 10 milliGRAM(s) Rectal daily PRN If no bowel movement by postoperative day #2  dextrose 40% Gel 15 Gram(s) Oral once PRN Blood Glucose LESS THAN 70 milliGRAM(s)/deciliter  dextrose 40% Gel 15 Gram(s) Oral once PRN Blood Glucose LESS THAN 70 milliGRAM(s)/deciliter  glucagon  Injectable 1 milliGRAM(s) IntraMuscular once PRN Glucose LESS THAN 70 milligrams/deciliter  glucagon  Injectable 1 milliGRAM(s) IntraMuscular once PRN Glucose LESS THAN 70 milligrams/deciliter  HYDROmorphone  Injectable 0.5 milliGRAM(s) IV Push every 4 hours PRN breakthrough pain  HYDROmorphone  Injectable 0.5 milliGRAM(s) IV Push every 15 minutes PRN pacu pain  magnesium hydroxide Suspension 30 milliLiter(s) Oral daily PRN Constipation  metoclopramide Injectable 10 milliGRAM(s) IV Push every 6 hours PRN Nausea and/or Vomiting  ondansetron Injectable 4 milliGRAM(s) IV Push every 6 hours PRN Nausea and/or Vomiting  oxyCODONE    IR 10 milliGRAM(s) Oral every 4 hours PRN Severe Pain (7 - 10)  oxyCODONE    IR 5 milliGRAM(s) Oral every 4 hours PRN Moderate Pain (4 - 6)  senna 2 Tablet(s) Oral at bedtime PRN Constipation  sodium chloride 0.9% lock flush 10 milliLiter(s) IV Push every 1 hour PRN Pre/post blood products, medications, blood draw, and to maintain line patency      Past medical history reviewed  Family history reviewed  Social history reviewed    PHYSICAL EXAM  Vital Signs Last 24 Hrs  T(C): 36.5 (26 Nov 2019 16:01), Max: 37.1 (26 Nov 2019 09:07)  T(F): 97.7 (26 Nov 2019 16:01), Max: 98.7 (26 Nov 2019 09:07)  HR: 77 (26 Nov 2019 16:01) (73 - 83)  BP: 138/82 (26 Nov 2019 16:01) (112/62 - 138/82)  BP(mean): --  RR: 17 (26 Nov 2019 16:01) (17 - 18)  SpO2: 97% (26 Nov 2019 16:01) (97% - 99%)    Constitutional: wn/wd in NAD.   HEENT: NCAT, MMM, OP clear, EOMI, no proptosis or lid retraction  Neck: no thyromegaly or palpable thyroid nodules   Respiratory: lungs CTAB.  Cardiovascular: regular rhythm, normal S1 and S2, no audible murmurs, no peripheral edema  GI: soft, NT/ND, no masses/HSM appreciated.  Neurology: no tremors, DTR 2+  Skin: no visible rashes/lesions  Psychiatric: AAO x 3, normal affect/mood.    LABS:                        11.6   10.56 )-----------( 298      ( 26 Nov 2019 06:43 )             36.6     11-26    137  |  106  |  15  ----------------------------<  90  3.8   |  22  |  0.97    Ca    8.6      26 Nov 2019 06:43    TPro  5.7<L>  /  Alb  3.0<L>  /  TBili  2.3<H>  /  DBili  1.2<H>  /  AST  120<H>  /  ALT  250<H>  /  AlkPhos  88  11-26            HbA1C: 7.5 % (10-22 @ 07:00)    CAPILLARY BLOOD GLUCOSE      POCT Blood Glucose.: 137 mg/dL (26 Nov 2019 17:10)  POCT Blood Glucose.: 137 mg/dL (26 Nov 2019 12:50)  POCT Blood Glucose.: 83 mg/dL (26 Nov 2019 09:21)  POCT Blood Glucose.: 226 mg/dL (25 Nov 2019 22:25)  POCT Blood Glucose.: 233 mg/dL (25 Nov 2019 21:19)      Direct LDL: 35 mg/dL (11-25-19 @ 06:50)

## 2019-11-26 NOTE — DISCHARGE NOTE PROVIDER - NSDCCPTREATMENT_GEN_ALL_CORE_FT
PRINCIPAL PROCEDURE  Procedure: Incision and drainage of right knee with polyethylene liner exchange  Findings and Treatment:

## 2019-11-26 NOTE — PROGRESS NOTE ADULT - SUBJECTIVE AND OBJECTIVE BOX
INTERVAL HISTORY:  The patient had a 10 minute episode of dyspnea at rest yesterday. It resolved spontaneously. He has not had any exertional dyspnea or chest pain.	  Chart, [facility] medications and allergies reviewed    MEDICATIONS:  MEDICATIONS  (STANDING):  ascorbic acid 500 milliGRAM(s) Oral two times a day  aspirin enteric coated 81 milliGRAM(s) Oral two times a day  atorvastatin 40 milliGRAM(s) Oral at bedtime  BUpivacaine liposome 1.3% Injectable (no eMAR) 20 milliLiter(s) Local Injection once  dextrose 5%. 1000 milliLiter(s) (50 mL/Hr) IV Continuous <Continuous>  dextrose 5%. 1000 milliLiter(s) (50 mL/Hr) IV Continuous <Continuous>  dextrose 50% Injectable 12.5 Gram(s) IV Push once  dextrose 50% Injectable 25 Gram(s) IV Push once  dextrose 50% Injectable 25 Gram(s) IV Push once  dextrose 50% Injectable 12.5 Gram(s) IV Push once  dextrose 50% Injectable 25 Gram(s) IV Push once  dextrose 50% Injectable 25 Gram(s) IV Push once  ferrous    sulfate 325 milliGRAM(s) Oral three times a day with meals  folic acid 1 milliGRAM(s) Oral daily  insulin glargine Injectable (LANTUS) 48 Unit(s) SubCutaneous at bedtime  insulin lispro (HumaLOG) corrective regimen sliding scale   SubCutaneous Before meals and at bedtime  insulin lispro Injectable (HumaLOG) 16 Unit(s) SubCutaneous three times a day before meals  lactated ringers. 1000 milliLiter(s) (80 mL/Hr) IV Continuous <Continuous>  metoprolol succinate  milliGRAM(s) Oral daily  multivitamin 1 Tablet(s) Oral daily  pantoprazole    Tablet 40 milliGRAM(s) Oral before breakfast  polyethylene glycol 3350 17 Gram(s) Oral daily  prasugrel 10 milliGRAM(s) Oral daily  rifAMPin IVPB 300 milliGRAM(s) IV Intermittent three times a day  vancomycin  IVPB 1000 milliGRAM(s) IV Intermittent every 12 hours      REVIEW OF SYSTEMS:  CONSTITUTIONAL: No fever, no weight loss, no fatigue  PULMONARY: No cough, no wheezing, chills no hemoptysis; No Shortness of Breath  CARDIOVASCULAR: No chest pain, no palpitations, no syncope, dizziness, no leg swelling  GASTROINTESTINAL: No abdominal pain. No nausea, no  vomiting, no hematemesis; No diarrhea, no constipation. No melena, no hematochezia.  GENITOURINARY: No dysuria, no frequency, no hematuria, no incontinence  SKIN: No itching, no burning, no rashes, no lesions     PHYSICAL EXAM:  T(C): 36.1 (11-26-19 @ 05:58), Max: 38 (11-25-19 @ 09:13)  HR: 83 (11-26-19 @ 05:58) (73 - 83)  BP: 136/83 (11-26-19 @ 05:58) (116/64 - 136/83)  RR: 17 (11-26-19 @ 05:58) (16 - 18)  SpO2: 97% (11-26-19 @ 05:58) (96% - 99%)  Wt(kg): --  I&O's Summary    25 Nov 2019 07:01  -  26 Nov 2019 07:00  --------------------------------------------------------  IN: 480 mL / OUT: 1400 mL / NET: -920 mL        Appearance:  No deformities, normal appearance	  HEENT:   Normal oral mucosa, PERRL, EOMI	  Neck: No jvd , no masses  Lymphatic: No  lymphadenopathy  Pulmonary: Lungs clear to auscultation and percussion  Cardiovascular: Normal S1 S2, No JVD, No murmur, No edema	  Abdomen:  Soft, Non-tender, + BS  Skin: No rashes, No ecchymoses	  Extremities:  No clubbing or cyanosis  MSK: Knee bandaged    LABS:		  CARDIAC MARKERS:                         11.6   10.56 )-----------( 298      ( 26 Nov 2019 06:43 )             36.6   11-26    x   |  106  |  15  ----------------------------<  90  3.8   |  22  |  0.97    Ca    8.6      26 Nov 2019 06:43      proBNP:  Lipid Profile:  HgA1c:  TSH:      Culture - Surgical Swab (collected 11-22-19 @ 19:16)  Source: .Surgical Swab Right knee superficial  Gram Stain (11-22-19 @ 21:10):    No organisms seen    Rare WBC's  Final Report (11-24-19 @ 11:38):    Few Methicillin resistant Staphylococcus aureus    Floor previously notified.  Organism: Methicillin resistant Staphylococcus aureus  Methicillin resistant Staphylococcus aureus (11-24-19 @ 11:38)  Organism: Methicillin resistant Staphylococcus aureus (11-24-19 @ 11:38)      -  Vancomycin: S 1.5      Method Type: ETEST  Organism: Methicillin resistant Staphylococcus aureus (11-24-19 @ 11:38)      -  Cefazolin: R 8      -  Clindamycin: S <=0.25      -  Daptomycin: S 0.5      -  Erythromycin: R >4      -  Linezolid: S 2      -  Oxacillin: R >2      -  Penicillin: R >8      -  RIF- Rifampin: S <=1 Should not be used as monotherapy      -  Tetra/Doxy: S <=1      -  Trimethoprim/Sulfamethoxazole: R >2/38      -  Vancomycin: S 1      Method Type: YOLIE    Culture - Tissue with Gram Stain (collected 11-22-19 @ 19:07)  Source: .Tissue lateral gutter  Gram Stain (11-22-19 @ 21:10):    No organisms seen    Rare WBC's  Final Report (11-25-19 @ 08:56):    Few Methicillin resistant Staphylococcus aureus  Organism: Methicillin resistant Staphylococcus aureus  Methicillin resistant Staphylococcus aureus (11-25-19 @ 08:56)  Organism: Methicillin resistant Staphylococcus aureus (11-25-19 @ 08:56)      -  Cefazolin: R 16      -  Clindamycin: S 0.5      -  Daptomycin: S 0.5      -  Erythromycin: R >4      -  Linezolid: S 4      -  Oxacillin: R >2      -  Penicillin: R >8      -  RIF- Rifampin: S <=1 Should not be used as monotherapy      -  Tetra/Doxy: S <=1      -  Trimethoprim/Sulfamethoxazole: R >2/38      Method Type: YOLIE  Organism: Methicillin resistant Staphylococcus aureus (11-25-19 @ 08:56)      -  Vancomycin: S 1.5      Method Type: ETEST    Culture - Tissue with Gram Stain (collected 11-22-19 @ 19:07)  Source: .Tissue medial right knee gutter  Gram Stain (11-22-19 @ 21:09):    No organisms seen    No WBC's seen.  Final Report (11-25-19 @ 08:54):    Rare Methicillin resistant Staphylococcus aureus  Organism: Methicillin resistant Staphylococcus aureus  Methicillin resistant Staphylococcus aureus (11-25-19 @ 08:54)  Organism: Methicillin resistant Staphylococcus aureus (11-25-19 @ 08:54)      -  Ampicillin: R >8      -  Ampicillin/Sulbactam: R <=8/4      -  Cefazolin: R 16      -  Ceftriaxone: R 32      -  Ciprofloxacin: R >2      -  Clindamycin: S <=0.25      -  Daptomycin: S 0.5      -  Erythromycin: R >4      -  Gentamicin: S <=1 Should not be used as monotherapy      -  Levofloxacin: R >4      -  Linezolid: S 2      -  Meropenem: R <=2      -  Moxifloxacin(Aerobic): R >4      -  Oxacillin: R >2      -  Penicillin: R >8      -  RIF- Rifampin: S <=1 Should not be used as monotherapy      -  Tetra/Doxy: S <=1      -  Trimethoprim/Sulfamethoxazole: R >2/38      Method Type: YOLIE  Organism: Methicillin resistant Staphylococcus aureus (11-25-19 @ 08:54)      -  Vancomycin: S 1.5      Method Type: ETEST    Culture - Tissue with Gram Stain (collected 11-22-19 @ 19:07)  Source: .Tissue interpatella fat pad  Gram Stain (11-22-19 @ 21:10):    No organisms seen    Rare WBC's  Final Report (11-25-19 @ 08:58):    Rare Methicillin resistant Staphylococcus aureus  Organism: Methicillin resistant Staphylococcus aureus  Methicillin resistant Staphylococcus aureus (11-25-19 @ 08:58)  Organism: Methicillin resistant Staphylococcus aureus (11-25-19 @ 08:58)      -  Cefazolin: R 8      -  Clindamycin: S <=0.25      -  Daptomycin: S 0.5      -  Erythromycin: R >4      -  Linezolid: S 4      -  Oxacillin: R >2      -  Penicillin: R >8      -  RIF- Rifampin: S <=1 Should not be used as monotherapy      -  Tetra/Doxy: S <=1      -  Trimethoprim/Sulfamethoxazole: R >2/38      Method Type: YOLIE  Organism: Methicillin resistant Staphylococcus aureus (11-25-19 @ 08:58)      -  Vancomycin: S 1.5      Method Type: ETEST    Culture - Blood (collected 11-22-19 @ 11:26)  Source: .Blood Blood-Peripheral  Preliminary Report (11-25-19 @ 12:00):    No growth at 3 days.    Culture - Blood (collected 11-22-19 @ 11:26)  Source: .Blood Blood-Peripheral  Preliminary Report (11-25-19 @ 12:00):    No growth at 3 days.      TELEMETRY: 	      QTC     ECG:  	   QTC         RADIOLOGY:   DIAGNOSTIC TESTING: [ ] Echocardiogram: [ ]  Catheterization: [ ] Stress Test:      ASSESSMENT/PLAN: 	  Coronary artery disease/stent-the patient had a coronary stent one year ago. He has no chest pain. He is a very high clotting risk. Continue prasugrel and aspirin.  On statin and metoprolol.     Cardiomyopathy -no clinical congestive heart failure. However, his pulmonary pressure is very high. The patient had episode of dyspnea but is now comfortable. Minimize fluids.     Ventricular arrhythmia-asymptomatic. Continue metoprolol. Follow up with electrophysiology as soon as possible after discharge.     Status post right knee replacement-as per Dr. Estes. Plan is prolonged IV antibiotics.    Discussed discharge plans

## 2019-11-26 NOTE — DISCHARGE NOTE PROVIDER - CARE PROVIDERS DIRECT ADDRESSES
[email or web address],[email or web address],DirectAddress_Unknown

## 2019-11-26 NOTE — PROGRESS NOTE ADULT - SUBJECTIVE AND OBJECTIVE BOX
INTERVAL HPI/OVERNIGHT EVENTS:  Pt seen and examined. Comfortable without complaints, pain controlled  Denies CP, SOB, N/V, numbness/tingling   CONSTITUTIONAL:  Negative fever or chills, feels well, good appetite  EYES:  Negative  blurry vision or double vision  CARDIOVASCULAR:  Negative for chest pain or palpitations  RESPIRATORY:  Negative for cough, wheezing, or SOB   GASTROINTESTINAL:  Negative for nausea, vomiting, diarrhea, constipation, or abdominal pain  GENITOURINARY:  Negative frequency, urgency or dysuria  NEUROLOGIC:  No headache, confusion, dizziness, lightheadedness      ANTIBIOTICS/RELEVANT:    MEDICATIONS  (STANDING):  ascorbic acid 500 milliGRAM(s) Oral two times a day  aspirin enteric coated 81 milliGRAM(s) Oral two times a day  atorvastatin 40 milliGRAM(s) Oral at bedtime  BUpivacaine liposome 1.3% Injectable (no eMAR) 20 milliLiter(s) Local Injection once  dextrose 5%. 1000 milliLiter(s) (50 mL/Hr) IV Continuous <Continuous>  dextrose 5%. 1000 milliLiter(s) (50 mL/Hr) IV Continuous <Continuous>  dextrose 50% Injectable 12.5 Gram(s) IV Push once  dextrose 50% Injectable 25 Gram(s) IV Push once  dextrose 50% Injectable 25 Gram(s) IV Push once  dextrose 50% Injectable 12.5 Gram(s) IV Push once  dextrose 50% Injectable 25 Gram(s) IV Push once  dextrose 50% Injectable 25 Gram(s) IV Push once  ferrous    sulfate 325 milliGRAM(s) Oral three times a day with meals  folic acid 1 milliGRAM(s) Oral daily  insulin glargine Injectable (LANTUS) 48 Unit(s) SubCutaneous at bedtime  insulin lispro (HumaLOG) corrective regimen sliding scale   SubCutaneous Before meals and at bedtime  insulin lispro Injectable (HumaLOG) 16 Unit(s) SubCutaneous three times a day before meals  lactated ringers. 1000 milliLiter(s) (80 mL/Hr) IV Continuous <Continuous>  metoprolol succinate  milliGRAM(s) Oral daily  multivitamin 1 Tablet(s) Oral daily  pantoprazole    Tablet 40 milliGRAM(s) Oral before breakfast  polyethylene glycol 3350 17 Gram(s) Oral daily  prasugrel 10 milliGRAM(s) Oral daily  vancomycin  IVPB 1000 milliGRAM(s) IV Intermittent every 12 hours    MEDICATIONS  (PRN):  acetaminophen   Tablet .. 650 milliGRAM(s) Oral every 6 hours PRN Mild Pain (1 - 3)  aluminum hydroxide/magnesium hydroxide/simethicone Suspension 30 milliLiter(s) Oral four times a day PRN Indigestion  bisacodyl Suppository 10 milliGRAM(s) Rectal daily PRN If no bowel movement by postoperative day #2  dextrose 40% Gel 15 Gram(s) Oral once PRN Blood Glucose LESS THAN 70 milliGRAM(s)/deciliter  dextrose 40% Gel 15 Gram(s) Oral once PRN Blood Glucose LESS THAN 70 milliGRAM(s)/deciliter  glucagon  Injectable 1 milliGRAM(s) IntraMuscular once PRN Glucose LESS THAN 70 milligrams/deciliter  glucagon  Injectable 1 milliGRAM(s) IntraMuscular once PRN Glucose LESS THAN 70 milligrams/deciliter  HYDROmorphone  Injectable 0.5 milliGRAM(s) IV Push every 4 hours PRN breakthrough pain  HYDROmorphone  Injectable 0.5 milliGRAM(s) IV Push every 15 minutes PRN pacu pain  magnesium hydroxide Suspension 30 milliLiter(s) Oral daily PRN Constipation  metoclopramide Injectable 10 milliGRAM(s) IV Push every 6 hours PRN Nausea and/or Vomiting  ondansetron Injectable 4 milliGRAM(s) IV Push every 6 hours PRN Nausea and/or Vomiting  oxyCODONE    IR 10 milliGRAM(s) Oral every 4 hours PRN Severe Pain (7 - 10)  oxyCODONE    IR 5 milliGRAM(s) Oral every 4 hours PRN Moderate Pain (4 - 6)  senna 2 Tablet(s) Oral at bedtime PRN Constipation  sodium chloride 0.9% lock flush 10 milliLiter(s) IV Push every 1 hour PRN Pre/post blood products, medications, blood draw, and to maintain line patency        Vital Signs Last 24 Hrs  T(C): 36.5 (26 Nov 2019 16:01), Max: 37.1 (26 Nov 2019 09:07)  T(F): 97.7 (26 Nov 2019 16:01), Max: 98.7 (26 Nov 2019 09:07)  HR: 77 (26 Nov 2019 16:01) (73 - 83)  BP: 138/82 (26 Nov 2019 16:01) (112/62 - 138/82)  BP(mean): --  RR: 17 (26 Nov 2019 16:01) (17 - 18)  SpO2: 97% (26 Nov 2019 16:01) (97% - 99%)    11-25-19 @ 07:01  -  11-26-19 @ 07:00  --------------------------------------------------------  IN: 480 mL / OUT: 1400 mL / NET: -920 mL    11-26-19 @ 07:01  -  11-26-19 @ 18:28  --------------------------------------------------------  IN: 0 mL / OUT: 450 mL / NET: -450 mL      PHYSICAL EXAM:  Constitutional:Well-developed, well nourished  Eyes:DAMARIS, EOMI  Ear/Nose/Throat: no oral lesion, no sinus tenderness on percussion	  Neck:no JVD, no lymphadenopathy, supple  Respiratory: CTA colton  Cardiovascular: S1S2 RRR, no murmurs  Gastrointestinal:soft, (+) BS, no HSM  Extremities: R knee DSG- prevena CDI, holding suction  Pulses: +2DP, WWP feet  Sensation: SILT  Motor: 5/5 EHL/FHL/TA/GS        LABS:  Sedimentation Rate, Erythrocyte (11.26.19 @ 06:43)    Sedimentation Rate, Erythrocyte: 22 mm/Hr    C-Reactive Protein, Serum (11.26.19 @ 06:43)    C-Reactive Protein, Serum: 11.19 mg/dL                            11.6   10.56 )-----------( 298      ( 26 Nov 2019 06:43 )             36.6     11-26    137  |  106  |  15  ----------------------------<  90  3.8   |  22  |  0.97    Ca    8.6      26 Nov 2019 06:43    TPro  5.7<L>  /  Alb  3.0<L>  /  TBili  2.3<H>  /  DBili  1.2<H>  /  AST  120<H>  /  ALT  250<H>  /  AlkPhos  88  11-26          MICROBIOLOGY:    RADIOLOGY & ADDITIONAL STUDIES:

## 2019-11-26 NOTE — DISCHARGE NOTE PROVIDER - NSDCCPCAREPLAN_GEN_ALL_CORE_FT
PRINCIPAL DISCHARGE DIAGNOSIS  Diagnosis: Infection of superficial incisional surgical site after procedure, initial encounter  Assessment and Plan of Treatment: Infection of superficial incisional surgical site after procedure, initial encounter

## 2019-11-26 NOTE — DISCHARGE NOTE PROVIDER - NSDCHHNEEDSERVICE_GEN_ALL_CORE
Wound care and assessment/Observation and assessment/Rehabilitation services/Medication teaching and assessment/Teaching and training

## 2019-11-26 NOTE — DISCHARGE NOTE PROVIDER - NSDCMRMEDTOKEN_GEN_ALL_CORE_FT
acetaminophen 325 mg oral tablet: 2 tab(s) orally every 6 hours  aspirin 81 mg oral delayed release tablet: 1 tab(s) orally 2 times a day. CONTINUE UNTIL 1 MONTH AFTER SURGERY (12/22). THEN DECREASE DOSE BACK TO ONCE DAILY.  CBC, CMP, ESR, CRP, VANCO TROPH: 1x/ week for 6 weeks    start date: 12/2/19  Send results to HÉCTOR Concepcion  fax: [phone]    diagnosis: T84.53XA  cefadroxil 500 mg oral capsule: 1 cap(s) orally 2 times a day   celecoxib 100 mg oral capsule: 1 cap(s) orally 2 times a day  Crestor 40 mg oral tablet: 1 tab(s) orally once a day  Cymbalta: 90 milligram(s) orally once a day  heparin 3mL flush from 5mL syringe: administer after each infusion  insulin pump:   Jardiance 10 mg oral tablet: 1 tab(s) orally once a day (in the morning)  Metoprolol Succinate  mg oral tablet, extended release: 1 tab(s) orally once a day  NS FLUSH 10 mL: administer before and after each infusion  Omega-3 oral capsule: 1 tab(s) orally once a day  oxycodone-acetaminophen 5 mg-325 mg oral tablet: 1-2 tab(s) orally every 4-6 hours, as needed for moderate to severe pain MDD:6  oxycodone-acetaminophen 5 mg-325 mg oral tablet: 1-2 tab(s) orally every 4-6 hours, as needed for moderate to severe pain  pantoprazole 40 mg oral delayed release tablet: 1 tab(s) orally once a day (before a meal)  polyethylene glycol 3350 oral powder for reconstitution: 17 gram(s) orally once a day  prasugrel 10 mg oral tablet: 1 tab(s) orally once a day  senna oral tablet: 2 tab(s) orally once a day (at bedtime), As needed, Constipation  Trulicity Pen 1.5 mg/0.5 mL subcutaneous solution:   vancomycin 1g in NS 250ml every 12 hours : Last date of administration: 1/3/20    diagnosis: T84.53XA  Zetia 10 mg oral tablet: 1 tab(s) orally once a day acetaminophen 325 mg oral tablet: 2 tab(s) orally every 6 hours  aspirin 81 mg oral delayed release tablet: 1 tab(s) orally 2 times a day. CONTINUE UNTIL 1 MONTH AFTER SURGERY (12/22). THEN DECREASE DOSE BACK TO ONCE DAILY.  CBC, CMP, ESR, CRP, VANCO TROPH: 1x/ week for 6 weeks    start date: 12/2/19  Send results to HÉCTOR Concepcion  fax: [phone]    diagnosis: T84.53XA  cefadroxil 500 mg oral capsule: 1 cap(s) orally 2 times a day   celecoxib 100 mg oral capsule: 1 cap(s) orally 2 times a day  Crestor 40 mg oral tablet: 1 tab(s) orally once a day  Cymbalta: 90 milligram(s) orally once a day  heparin 3mL flush from 5mL syringe: administer after each infusion  insulin pump:   Jardiance 10 mg oral tablet: 1 tab(s) orally once a day (in the morning)  Metoprolol Succinate  mg oral tablet, extended release: 1 tab(s) orally once a day  NS FLUSH 10 mL: administer before and after each infusion  Omega-3 oral capsule: 1 tab(s) orally once a day  oxycodone-acetaminophen 5 mg-325 mg oral tablet: 1-2 tab(s) orally every 4-6 hours, as needed for moderate to severe pain MDD:6  oxycodone-acetaminophen 5 mg-325 mg oral tablet: 1-2 tab(s) orally every 4-6 hours, as needed for moderate to severe pain  pantoprazole 40 mg oral delayed release tablet: 1 tab(s) orally once a day (before a meal)  polyethylene glycol 3350 oral powder for reconstitution: 17 gram(s) orally once a day  prasugrel 10 mg oral tablet: 1 tab(s) orally once a day  senna oral tablet: 2 tab(s) orally once a day (at bedtime), As needed, Constipation  Trulicity Pen 1.5 mg/0.5 mL subcutaneous solution:   vancomycin 1g in NS 250ml every 12 hours : Last date of administration: 1/3/20    diagnosis: T84.53XA  Zetia 10 mg oral tablet: 1 tab(s) orally once a day acetaminophen 325 mg oral tablet: 2 tab(s) orally every 6 hours  aspirin 81 mg oral delayed release tablet: 1 tab(s) orally 2 times a day. CONTINUE UNTIL 1 MONTH AFTER SURGERY (12/22). THEN DECREASE DOSE BACK TO ONCE DAILY.  CBC, CMP, ESR, CRP, VANCO TROPH: 1x/ week for 6 weeks    start date: 12/2/19  Send results to HÉCTOR Concepcion  fax: [phone]    diagnosis: T84.53XA  cefadroxil 500 mg oral capsule: 1 cap(s) orally 2 times a day   celecoxib 100 mg oral capsule: 1 cap(s) orally 2 times a day  Crestor 40 mg oral tablet: 1 tab(s) orally once a day  Cymbalta: 90 milligram(s) orally once a day  heparin 3mL flush from 5mL syringe: administer after each infusion  insulin pump:   Jardiance 10 mg oral tablet: 1 tab(s) orally once a day (in the morning)  Metoprolol Succinate  mg oral tablet, extended release: 1 tab(s) orally once a day  NS FLUSH 10 mL: administer before and after each infusion  Omega-3 oral capsule: 1 tab(s) orally once a day  oxycodone-acetaminophen 5 mg-325 mg oral tablet: 1-2 tab(s) orally every 4-6 hours, as needed for moderate to severe pain MDD:6  oxycodone-acetaminophen 5 mg-325 mg oral tablet: 1-2 tab(s) orally every 4-6 hours, as needed for moderate to severe pain  pantoprazole 40 mg oral delayed release tablet: 1 tab(s) orally once a day (before a meal)  polyethylene glycol 3350 oral powder for reconstitution: 17 gram(s) orally once a day  prasugrel 10 mg oral tablet: 1 tab(s) orally once a day  senna oral tablet: 2 tab(s) orally once a day (at bedtime), As needed, Constipation  Trulicity Pen 1.5 mg/0.5 mL subcutaneous solution:   vancomycin 1g in NS 250ml every 12 hours : Last date of administration: 1/3/20    diagnosis: T84.53XA  Zetia 10 mg oral tablet: 1 tab(s) orally once a day acetaminophen 325 mg oral tablet: 2 tab(s) orally every 6 hours  aspirin 81 mg oral delayed release tablet: 1 tab(s) orally 2 times a day. CONTINUE UNTIL 1 MONTH AFTER SURGERY (12/22). THEN DECREASE DOSE BACK TO ONCE DAILY.  Crestor 40 mg oral tablet: 1 tab(s) orally once a day  Cymbalta: 90 milligram(s) orally once a day  Jardiance 10 mg oral tablet: 1 tab(s) orally once a day (in the morning)  Metoprolol Succinate  mg oral tablet, extended release: 1 tab(s) orally once a day  oxycodone-acetaminophen 5 mg-325 mg oral tablet: 1-2 tab(s) orally every 4-6 hours, as needed for moderate to severe pain  pantoprazole 40 mg oral delayed release tablet: 1 tab(s) orally once a day (before a meal)  polyethylene glycol 3350 oral powder for reconstitution: 17 gram(s) orally once a day  prasugrel 10 mg oral tablet: 1 tab(s) orally once a day  rifAMPin 150 mg oral capsule: 2 cap(s) orally every 12 hours for 6 weeks  senna oral tablet: 2 tab(s) orally once a day (at bedtime), As needed, Constipation  Trulicity Pen 1.5 mg/0.5 mL subcutaneous solution:   vancomycin 1g in NS 250ml every 12 hours : Last date of administration: 1/3/20    diagnosis: T84.53XA  Zetia 10 mg oral tablet: 1 tab(s) orally once a day

## 2019-11-26 NOTE — DISCHARGE NOTE PROVIDER - NSDCFUSCHEDAPPT_GEN_ALL_CORE_FT
FLOWER MARISCAL ; 12/05/2019 ; [facility] [address]   FLOWER MARISCAL ; 12/27/2019 ; [facility] [address]

## 2019-11-26 NOTE — DISCHARGE NOTE PROVIDER - NSDCACTIVITY_GEN_ALL_CORE
Walking - Indoors allowed/Do not drive or operate machinery/Showering allowed/No heavy lifting/straining

## 2019-11-26 NOTE — DISCHARGE NOTE PROVIDER - NSDCFUADDAPPT_GEN_ALL_CORE_FT
Follow up with Dr. Saeed (infectious diseases) within the next couple of weeks.  Also follow-up with your cardiologist, Dr. Caldera, and Dr. Villatoro or the endocrinologist who follows you at home to monitor your glucose. Follow up with Dr. Saeed (infectious diseases) within the next couple of weeks. Call her office to confirm your lab results, and to discuss any adjustments needed to your medication regimen (i.e. when you can start taking your rifampin)  Also follow-up with your cardiologist, Dr. Caldera, and Dr. Villatoro or the endocrinologist who follows you at home to monitor your glucose.

## 2019-11-26 NOTE — PROGRESS NOTE ADULT - REASON FOR ADMISSION
Right knee infection

## 2019-11-26 NOTE — PROGRESS NOTE ADULT - PROBLEM SELECTOR PLAN 1
1) Continue Vancomycin 1gr IV q12h x 6 weeks  2) Check Vancomycin trough prior to discharge  3) LFTs elevated today; hold  Rifampin until labs checked on Monday then restart 300mg q12h; d/c Statins for duration of antibiotics.  4) Prevena dressing per surgery  5) Ortho f/u  6) Check weekly CBC, CMP, ESR,  CRP and fax to my office [phone].

## 2019-11-26 NOTE — DISCHARGE NOTE PROVIDER - HOSPITAL COURSE
Admitted R knee infection 11/22/19    Right knee I+D/ [facility]change 11/22/19    Vero-op Antibiotics    ID consult    Pain control    DVT prophylaxis    OOB/Physical Therapy

## 2019-11-26 NOTE — PROGRESS NOTE ADULT - ATTENDING COMMENTS
A/P 64yMale with hx of DM presenting for management of knee infection with conitnued intermittently hyperglycemia    1.  DM: type 2, controlled, complicated.   continue 48 units lantus at bedtime, lispro 16 units tid with meals.  Continue lispro moderate dose scale with meals and at bedtime.   Continue consistent carb diet  FSG Goal 100-180    2.  Hyperlipidemia - goal LDL < 70  continue statin    3.  Obesity - outpatient medical management.    GLP-1 agonist.       Pt is advised to follow up with me at discharge by calling .
A/P 64yMale with hx of DM presenting for management of knee infection with good glycemic control    1.  DM: type 2, controlled, complicated.   Hold lantus tonight if pt to be discharged - can start insulin pump.  Otherwise continue lantus 48 units at bedtime.     lispro 16 units tid with meals.   Continue lispro moderate dose scale with meals and at bedtime.   Continue consistent carb diet  FSG Goal 100-180    2.  Hyperlipidemia - goal LDL < 70  continue statin  3.  Obesity - outpatient medical management.    consider GLP1-agonist    For discharge:   pt can resume insulin pump.      Pt is advised to follow up with me at discharge by calling .
A/P 64yMale with hx of DM presenting for management of knee infection with improving glycemic control    1.  DM: type 2, controlled, complicated.   continue lantus 48 units at bedtime, lispro 16 units TID with meals.   Continue lispro moderate dose scale with meals and at bedtime.   Continue consistent carb diet  FSG Goal 100-180    2.  Hyperlipidemia - goal LDL < 70  continue atorvastatin 40mg daily    3.  Obesity - outpatient medical management.    consider GLP-1 agonist    Pt is advised to follow up with me at discharge by calling .
A/P 64yMale with hx of DM presenting for management of right knee infection    1.  DM: type 2, controlled, complicated  increase lantus to 48 units at bedtime, lispro 16 units TID with meals.   Continue lispro moderate dose scale with meals and at bedtime.   Continue consistent carb diet  FSG Goal 100-180    2.  Hyperlipidemia - goal LDL < 70  continue statin    3.  Obesity - outpatient medical management.    consider outpatient GLP-1 agonist.     Pt is advised to follow up with me at discharge by calling .

## 2019-11-26 NOTE — DIETITIAN INITIAL EVALUATION ADULT. - ADD RECOMMEND
1. CST CHO diet with 1200 ml fluid restriction. 2. Recommend add gluten free to current diet order per hx of celiac disease. 3. Continue to encourage adequate PO intake

## 2019-11-26 NOTE — DIETITIAN INITIAL EVALUATION ADULT. - PERTINENT MEDS FT
Ecotrin  Effient  Vanco  Senna  Insulin  Dulcolax  Vit C  Lipitor  Ferrous Sulfate  FA  MVI  Zofran  Miralax

## 2019-11-26 NOTE — PROGRESS NOTE ADULT - SUBJECTIVE AND OBJECTIVE BOX
Ortho Note    Pt seen and examined. Comfortable without complaints, pain controlled  Denies CP, SOB, N/V, numbness/tingling     Vital Signs Last 24 Hrs  T(C): 37.1 (11-26-19 @ 09:07), Max: 37.1 (11-26-19 @ 09:07)  T(F): 98.7 (11-26-19 @ 09:07), Max: 98.7 (11-26-19 @ 09:07)  HR: 83 (11-26-19 @ 09:07) (83 - 83)  BP: 126/76 (11-26-19 @ 09:07) (126/76 - 136/83)  BP(mean): --  RR: 18 (11-26-19 @ 09:07) (17 - 18)  SpO2: 98% (11-26-19 @ 09:07) (97% - 98%)  AVSS    General: Pt Alert and oriented, NAD  DSG- prevena CDI, holding suction  Pulses: +2DP, WWP feet  Sensation: SILT  Motor: 5/5 EHL/FHL/TA/GS                          11.6   10.56 )-----------( 298      ( 26 Nov 2019 06:43 )             36.6   26 Nov 2019 06:43    137    |  106    |  15     ----------------------------<  90     3.8     |  22     |  0.97     Ca    8.6        26 Nov 2019 06:43        A/P: 64yMale POD#4 s/p right knee I+D, 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 Concepcion recs (Vanco continued as outpatient + PO rifampin q12h)  - PICC line today  - f/u LFTs prior to d/c on rifampin  - appreciate endocrine recs- can be discharged on home regimen + own insulin pump as per Dr. Villatoro  - Pain Control- continue current regimen, pain well-controlled  - DVT ppx: ASA bid  - PT, WBS: WBAT RLE with KI  - continue bowel regimen  - OOB for meals, I/S  - dispo: home with hospitals, pending establishment home infusion services      Ortho Pager [phone]

## 2019-11-26 NOTE — DISCHARGE NOTE NURSING/CASE MANAGEMENT/SOCIAL WORK - NSDCFUADDAPPT_GEN_ALL_CORE_FT
Follow up with Dr. Saeed (infectious diseases) within the next couple of weeks.  Also follow-up with your cardiologist, Dr. Caldera, and Dr. Villatoro or the endocrinologist who follows you at home to monitor your glucose.

## 2019-11-26 NOTE — DISCHARGE NOTE PROVIDER - PROVIDER TOKENS
PROVIDER:[TOKEN:[[id number]],FOLLOWUP:[2 weeks]],PROVIDER:[TOKEN:[[id number]],FOLLOWUP:[2 weeks]],PROVIDER:[TOKEN:[[id number]],FOLLOWUP:[2 weeks]]

## 2019-11-27 LAB
CULTURE RESULTS: SIGNIFICANT CHANGE UP
CULTURE RESULTS: SIGNIFICANT CHANGE UP
SPECIMEN SOURCE: SIGNIFICANT CHANGE UP
SPECIMEN SOURCE: SIGNIFICANT CHANGE UP

## 2019-11-30 RX ORDER — ONDANSETRON 8 MG/1
1 TABLET, FILM COATED ORAL
Qty: 9 | Refills: 0
Start: 2019-11-30 | End: 2019-12-02

## 2019-12-02 LAB — SURGICAL PATHOLOGY STUDY: SIGNIFICANT CHANGE UP

## 2019-12-03 ENCOUNTER — APPOINTMENT (OUTPATIENT)
Dept: ORTHOPEDIC SURGERY | Facility: CLINIC | Age: 64
End: 2019-12-03

## 2019-12-03 ENCOUNTER — INPATIENT (INPATIENT)
Facility: HOSPITAL | Age: 64
LOS: 0 days | Discharge: HOME | DRG: 560 | End: 2019-12-04
Attending: ORTHOPAEDIC SURGERY | Admitting: ORTHOPAEDIC SURGERY
Payer: COMMERCIAL

## 2019-12-03 VITALS
SYSTOLIC BLOOD PRESSURE: 103 MMHG | DIASTOLIC BLOOD PRESSURE: 63 MMHG | RESPIRATION RATE: 18 BRPM | WEIGHT: 235.01 LBS | OXYGEN SATURATION: 95 % | HEART RATE: 90 BPM | TEMPERATURE: 98 F | HEIGHT: 73 IN

## 2019-12-03 VITALS
HEART RATE: 87 BPM | OXYGEN SATURATION: 96 % | TEMPERATURE: 98 F | RESPIRATION RATE: 18 BRPM | DIASTOLIC BLOOD PRESSURE: 71 MMHG | SYSTOLIC BLOOD PRESSURE: 110 MMHG

## 2019-12-03 DIAGNOSIS — Z95.1 PRESENCE OF AORTOCORONARY BYPASS GRAFT: Chronic | ICD-10-CM
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
## 2019-12-03 DIAGNOSIS — T81.41XA INFECTION FOLLOWING A PROCEDURE, SUPERFICIAL INCISIONAL SURGICAL SITE, INITIAL ENCOUNTER: ICD-10-CM

## 2019-12-03 DIAGNOSIS — I25.2 OLD MYOCARDIAL INFARCTION: ICD-10-CM

## 2019-12-03 DIAGNOSIS — Z95.5 PRESENCE OF CORONARY ANGIOPLASTY IMPLANT AND GRAFT: Chronic | ICD-10-CM

## 2019-12-03 DIAGNOSIS — L03.115 CELLULITIS OF RIGHT LOWER LIMB: ICD-10-CM

## 2019-12-03 LAB
ALBUMIN SERPL ELPH-MCNC: 3.3 G/DL — SIGNIFICANT CHANGE UP (ref 3.3–5)
ALP SERPL-CCNC: 118 U/L — SIGNIFICANT CHANGE UP (ref 40–120)
ALT FLD-CCNC: 185 U/L — HIGH (ref 10–45)
ANION GAP SERPL CALC-SCNC: 12 MMOL/L — SIGNIFICANT CHANGE UP (ref 5–17)
APTT BLD: 27.1 SEC — LOW (ref 27.5–36.3)
AST SERPL-CCNC: 67 U/L — HIGH (ref 10–40)
BASOPHILS # BLD AUTO: 0.1 K/UL — SIGNIFICANT CHANGE UP (ref 0–0.2)
BASOPHILS NFR BLD AUTO: 1.1 % — SIGNIFICANT CHANGE UP (ref 0–2)
BILIRUB SERPL-MCNC: 0.9 MG/DL — SIGNIFICANT CHANGE UP (ref 0.2–1.2)
BUN SERPL-MCNC: 19 MG/DL — SIGNIFICANT CHANGE UP (ref 7–23)
CALCIUM SERPL-MCNC: 8.4 MG/DL — SIGNIFICANT CHANGE UP (ref 8.4–10.5)
CHLORIDE SERPL-SCNC: 98 MMOL/L — SIGNIFICANT CHANGE UP (ref 96–108)
CO2 SERPL-SCNC: 26 MMOL/L — SIGNIFICANT CHANGE UP (ref 22–31)
CREAT SERPL-MCNC: 1.07 MG/DL — SIGNIFICANT CHANGE UP (ref 0.5–1.3)
CRP SERPL-MCNC: 4.85 MG/DL — HIGH (ref 0–0.4)
EOSINOPHIL # BLD AUTO: 0.3 K/UL — SIGNIFICANT CHANGE UP (ref 0–0.5)
EOSINOPHIL NFR BLD AUTO: 3.4 % — SIGNIFICANT CHANGE UP (ref 0–6)
ERYTHROCYTE [SEDIMENTATION RATE] IN BLOOD: 15 MM/HR — SIGNIFICANT CHANGE UP
EXTRA SST TUBE: SIGNIFICANT CHANGE UP
GLUCOSE BLDC GLUCOMTR-MCNC: 165 MG/DL — HIGH (ref 70–99)
GLUCOSE SERPL-MCNC: 225 MG/DL — HIGH (ref 70–99)
HCT VFR BLD CALC: 33.5 % — LOW (ref 39–50)
HGB BLD-MCNC: 10.3 G/DL — LOW (ref 13–17)
IMM GRANULOCYTES NFR BLD AUTO: 0.3 % — SIGNIFICANT CHANGE UP (ref 0–1.5)
INR BLD: 1.67 — HIGH (ref 0.88–1.16)
LYMPHOCYTES # BLD AUTO: 0.94 K/UL — LOW (ref 1–3.3)
LYMPHOCYTES # BLD AUTO: 10.8 % — LOW (ref 13–44)
MCHC RBC-ENTMCNC: 25.8 PG — LOW (ref 27–34)
MCHC RBC-ENTMCNC: 30.7 GM/DL — LOW (ref 32–36)
MCV RBC AUTO: 83.8 FL — SIGNIFICANT CHANGE UP (ref 80–100)
MONOCYTES # BLD AUTO: 1.24 K/UL — HIGH (ref 0–0.9)
MONOCYTES NFR BLD AUTO: 14.2 % — HIGH (ref 2–14)
NEUTROPHILS # BLD AUTO: 6.1 K/UL — SIGNIFICANT CHANGE UP (ref 1.8–7.4)
NEUTROPHILS NFR BLD AUTO: 70.2 % — SIGNIFICANT CHANGE UP (ref 43–77)
NRBC # BLD: 0 /100 WBCS — SIGNIFICANT CHANGE UP (ref 0–0)
PLATELET # BLD AUTO: 416 K/UL — HIGH (ref 150–400)
POTASSIUM SERPL-MCNC: 4.6 MMOL/L — SIGNIFICANT CHANGE UP (ref 3.5–5.3)
POTASSIUM SERPL-SCNC: 4.6 MMOL/L — SIGNIFICANT CHANGE UP (ref 3.5–5.3)
PROT SERPL-MCNC: 6.6 G/DL — SIGNIFICANT CHANGE UP (ref 6–8.3)
PROTHROM AB SERPL-ACNC: 19.2 SEC — HIGH (ref 10–12.9)
RBC # BLD: 4 M/UL — LOW (ref 4.2–5.8)
RBC # FLD: 18.1 % — HIGH (ref 10.3–14.5)
SODIUM SERPL-SCNC: 136 MMOL/L — SIGNIFICANT CHANGE UP (ref 135–145)
VANCOMYCIN TROUGH SERPL-MCNC: 15.2 UG/ML — SIGNIFICANT CHANGE UP (ref 10–20)
WBC # BLD: 8.71 K/UL — SIGNIFICANT CHANGE UP (ref 3.8–10.5)
WBC # FLD AUTO: 8.71 K/UL — SIGNIFICANT CHANGE UP (ref 3.8–10.5)

## 2019-12-03 PROCEDURE — 86850 RBC ANTIBODY SCREEN: CPT

## 2019-12-03 PROCEDURE — 99285 EMERGENCY DEPT VISIT HI MDM: CPT

## 2019-12-03 PROCEDURE — 71046 X-RAY EXAM CHEST 2 VIEWS: CPT

## 2019-12-03 PROCEDURE — 93010 ELECTROCARDIOGRAM REPORT: CPT

## 2019-12-03 PROCEDURE — 85652 RBC SED RATE AUTOMATED: CPT

## 2019-12-03 PROCEDURE — 80202 ASSAY OF VANCOMYCIN: CPT

## 2019-12-03 PROCEDURE — 85730 THROMBOPLASTIN TIME PARTIAL: CPT

## 2019-12-03 PROCEDURE — 99285 EMERGENCY DEPT VISIT HI MDM: CPT | Mod: 25

## 2019-12-03 PROCEDURE — 93005 ELECTROCARDIOGRAM TRACING: CPT

## 2019-12-03 PROCEDURE — 73562 X-RAY EXAM OF KNEE 3: CPT | Mod: 26,RT

## 2019-12-03 PROCEDURE — 36415 COLL VENOUS BLD VENIPUNCTURE: CPT

## 2019-12-03 PROCEDURE — 86140 C-REACTIVE PROTEIN: CPT

## 2019-12-03 PROCEDURE — 82962 GLUCOSE BLOOD TEST: CPT

## 2019-12-03 PROCEDURE — 80053 COMPREHEN METABOLIC PANEL: CPT

## 2019-12-03 PROCEDURE — 85610 PROTHROMBIN TIME: CPT

## 2019-12-03 PROCEDURE — 73562 X-RAY EXAM OF KNEE 3: CPT

## 2019-12-03 PROCEDURE — 71046 X-RAY EXAM CHEST 2 VIEWS: CPT | Mod: 26

## 2019-12-03 PROCEDURE — 86901 BLOOD TYPING SEROLOGIC RH(D): CPT

## 2019-12-03 PROCEDURE — 85025 COMPLETE CBC W/AUTO DIFF WBC: CPT

## 2019-12-03 PROCEDURE — 86900 BLOOD TYPING SEROLOGIC ABO: CPT

## 2019-12-03 RX ORDER — METOPROLOL TARTRATE 50 MG
100 TABLET ORAL DAILY
Refills: 0 | Status: DISCONTINUED | OUTPATIENT
Start: 2019-12-03 | End: 2019-12-04

## 2019-12-03 RX ORDER — OXYCODONE HYDROCHLORIDE 5 MG/1
5 TABLET ORAL EVERY 4 HOURS
Refills: 0 | Status: DISCONTINUED | OUTPATIENT
Start: 2019-12-03 | End: 2019-12-04

## 2019-12-03 RX ORDER — MAGNESIUM HYDROXIDE 400 MG/1
30 TABLET, CHEWABLE ORAL DAILY
Refills: 0 | Status: DISCONTINUED | OUTPATIENT
Start: 2019-12-03 | End: 2019-12-04

## 2019-12-03 RX ORDER — OXYCODONE HYDROCHLORIDE 5 MG/1
10 TABLET ORAL EVERY 4 HOURS
Refills: 0 | Status: DISCONTINUED | OUTPATIENT
Start: 2019-12-03 | End: 2019-12-04

## 2019-12-03 RX ORDER — POLYETHYLENE GLYCOL 3350 17 G/17G
17 POWDER, FOR SOLUTION ORAL DAILY
Refills: 0 | Status: DISCONTINUED | OUTPATIENT
Start: 2019-12-03 | End: 2019-12-04

## 2019-12-03 RX ORDER — ATORVASTATIN CALCIUM 80 MG/1
80 TABLET, FILM COATED ORAL AT BEDTIME
Refills: 0 | Status: DISCONTINUED | OUTPATIENT
Start: 2019-12-03 | End: 2019-12-04

## 2019-12-03 RX ORDER — DEXTROSE 50 % IN WATER 50 %
12.5 SYRINGE (ML) INTRAVENOUS ONCE
Refills: 0 | Status: DISCONTINUED | OUTPATIENT
Start: 2019-12-03 | End: 2019-12-04

## 2019-12-03 RX ORDER — HYDROMORPHONE HYDROCHLORIDE 2 MG/ML
0.5 INJECTION INTRAMUSCULAR; INTRAVENOUS; SUBCUTANEOUS EVERY 4 HOURS
Refills: 0 | Status: DISCONTINUED | OUTPATIENT
Start: 2019-12-03 | End: 2019-12-04

## 2019-12-03 RX ORDER — ASPIRIN/CALCIUM CARB/MAGNESIUM 324 MG
81 TABLET ORAL
Refills: 0 | Status: DISCONTINUED | OUTPATIENT
Start: 2019-12-03 | End: 2019-12-04

## 2019-12-03 RX ORDER — GLUCAGON INJECTION, SOLUTION 0.5 MG/.1ML
1 INJECTION, SOLUTION SUBCUTANEOUS ONCE
Refills: 0 | Status: DISCONTINUED | OUTPATIENT
Start: 2019-12-03 | End: 2019-12-04

## 2019-12-03 RX ORDER — DEXTROSE 50 % IN WATER 50 %
25 SYRINGE (ML) INTRAVENOUS ONCE
Refills: 0 | Status: DISCONTINUED | OUTPATIENT
Start: 2019-12-03 | End: 2019-12-04

## 2019-12-03 RX ORDER — MORPHINE SULFATE 50 MG/1
2 CAPSULE, EXTENDED RELEASE ORAL EVERY 4 HOURS
Refills: 0 | Status: DISCONTINUED | OUTPATIENT
Start: 2019-12-03 | End: 2019-12-04

## 2019-12-03 RX ORDER — PANTOPRAZOLE SODIUM 20 MG/1
40 TABLET, DELAYED RELEASE ORAL
Refills: 0 | Status: DISCONTINUED | OUTPATIENT
Start: 2019-12-03 | End: 2019-12-04

## 2019-12-03 RX ORDER — SODIUM CHLORIDE 9 MG/ML
1000 INJECTION, SOLUTION INTRAVENOUS
Refills: 0 | Status: DISCONTINUED | OUTPATIENT
Start: 2019-12-03 | End: 2019-12-04

## 2019-12-03 RX ORDER — DULOXETINE HYDROCHLORIDE 30 MG/1
90 CAPSULE, DELAYED RELEASE ORAL DAILY
Refills: 0 | Status: DISCONTINUED | OUTPATIENT
Start: 2019-12-03 | End: 2019-12-04

## 2019-12-03 RX ORDER — VANCOMYCIN HCL 1 G
1000 VIAL (EA) INTRAVENOUS ONCE
Refills: 0 | Status: COMPLETED | OUTPATIENT
Start: 2019-12-03 | End: 2019-12-03

## 2019-12-03 RX ORDER — ONDANSETRON 8 MG/1
4 TABLET, FILM COATED ORAL EVERY 6 HOURS
Refills: 0 | Status: DISCONTINUED | OUTPATIENT
Start: 2019-12-03 | End: 2019-12-04

## 2019-12-03 RX ORDER — INSULIN LISPRO 100/ML
VIAL (ML) SUBCUTANEOUS
Refills: 0 | Status: DISCONTINUED | OUTPATIENT
Start: 2019-12-03 | End: 2019-12-04

## 2019-12-03 RX ORDER — ACETAMINOPHEN 500 MG
650 TABLET ORAL EVERY 6 HOURS
Refills: 0 | Status: DISCONTINUED | OUTPATIENT
Start: 2019-12-03 | End: 2019-12-04

## 2019-12-03 RX ORDER — SENNA PLUS 8.6 MG/1
2 TABLET ORAL AT BEDTIME
Refills: 0 | Status: DISCONTINUED | OUTPATIENT
Start: 2019-12-03 | End: 2019-12-04

## 2019-12-03 RX ORDER — DEXTROSE 50 % IN WATER 50 %
15 SYRINGE (ML) INTRAVENOUS ONCE
Refills: 0 | Status: DISCONTINUED | OUTPATIENT
Start: 2019-12-03 | End: 2019-12-04

## 2019-12-03 RX ADMIN — ATORVASTATIN CALCIUM 80 MILLIGRAM(S): 80 TABLET, FILM COATED ORAL at 22:52

## 2019-12-03 RX ADMIN — MORPHINE SULFATE 2 MILLIGRAM(S): 50 CAPSULE, EXTENDED RELEASE ORAL at 21:14

## 2019-12-03 RX ADMIN — ONDANSETRON 4 MILLIGRAM(S): 8 TABLET, FILM COATED ORAL at 21:15

## 2019-12-03 RX ADMIN — Medication 2: at 22:51

## 2019-12-03 RX ADMIN — Medication 250 MILLIGRAM(S): at 22:31

## 2019-12-03 NOTE — ED PROVIDER NOTE - ATTENDING CONTRIBUTION TO CARE
increasing redness/swelling/pain of knee past few days despite taking iv antibiotic as directed.  concern for worsening infection.  knee with erythema around incision, swelling.  vitals stable.  ortho consulted, admit for further management increasing redness/swelling/pain of knee past few days despite taking iv antibiotic as directed.  concern for worsening infection.  knee with erythema around incision, swelling.  vitals stable.  ortho consulted, initial plan to admit for further management but after further eval, decided that change in wound likely related to removal of knee imobilizer, not worsening infection (no wbc and crp downtrending).  placed in cast by ortho and cleared for dc after receiving pm dose of vanco

## 2019-12-03 NOTE — ED PROVIDER NOTE - CARE PROVIDER_API CALL
Santos Estes)  Orthopaedic Surgery  [address], [address] [place] [address]  Phone: [phone]  Fax: [phone]  Follow Up Time: 1-3 days

## 2019-12-03 NOTE — ED ADULT NURSE NOTE - CHPI ED NUR SYMPTOMS NEG
no purulent drainage/no rectal pain/no drainage/no fever/no chills/no blood in mucus/no bleeding at site/no vomiting

## 2019-12-03 NOTE — ED PROVIDER NOTE - OBJECTIVE STATEMENT
63 y/o M with PMHx of DM, HTN, HLD, CAD w/ stent, CHF, s/p R knee replacement on october 2019 recently admitted on Nov 22-26 for wound infection w/ I&D and exchange of polyethylene liner currently on IV Vancomycin 1g Q12 per hour by PICC (last dose was at 10am today). Pt came to the ED c/o increasing pain, swelling, and redness over the past 2d with wound edges starting to spread. Denies fever, chills, CP, exertion on dyspnea, and palpitations.

## 2019-12-03 NOTE — ED PROVIDER NOTE - PMH
Atherosclerosis of coronary artery  CAD (coronary artery disease)  Blood clot due to device, implant, or graft  was on blood thinners  CHF (congestive heart failure)    Chronic systolic CHF (congestive heart failure)    Diabetes    HLD (hyperlipidemia)    HTN (hypertension)    Osteoarthritis    Status post percutaneous transluminal coronary angioplasty  in 2012

## 2019-12-03 NOTE — ED ADULT TRIAGE NOTE - CHIEF COMPLAINT QUOTE
Pt directed to ED for admission for IV Abx.  Pt states "I had a Right TKR on 10/21 and I had a subsequent infection and required a washout, but now it looks like the knee is swelling again and I need to be admitted again."  Pt noted with PICC to VIKAS.  Pt denies N/V/D, SOB, Fevers, CP, Dizziness.

## 2019-12-03 NOTE — ED PROVIDER NOTE - MUSCULOSKELETAL, MLM
R knee warm swollen with erythema. Around wound edges slight dehiscence in center. Sutures and staples still in place. no Calf tenderness no swelling/ Feet pink warm with +1 DP pulse  Spine appears normal, range of motion is not limited, no muscle or joint tenderness. R knee warm swollen with erythema. Around wound edges slight dehiscence in center. Sutures and staples still in place. no AROM nearly 90 deg. Calf tenderness no swelling/ Feet pink warm with +1 DP pulse  Spine appears normal, range of motion is not limited, no muscle or joint tenderness. R knee warm swollen with mild erythema at wound edges, scabbing in center. Sutures and staples still in place. no AROM nearly 90 deg. Calf tenderness no swelling/ Feet pink warm with +1 DP pulse  Spine appears normal, range of motion is not limited, no muscle or joint tenderness.

## 2019-12-03 NOTE — ED PROVIDER NOTE - CLINICAL SUMMARY MEDICAL DECISION MAKING FREE TEXT BOX
Right knee surgical wound infection; likely cellulitis, doubt joint infection as afebrile with ROM of nearly 90 deg.  Will check labs, XR, and US RLE r/o DVT (low suspicion). Ortho consulted, continue IV abx.  To be admitted for possible surgical intervention. Right knee pain and mild erythema around surgical incision; probably mild cellulitis, doubt joint infection as afebrile with ROM of nearly 90 deg.  Will check labs, XR, and US RLE r/o DVT (low suspicion). Ortho consulted, continue IV abx.  To be admitted for possible surgical intervention. Right knee pain and mild erythema around surgical incision; probably mild cellulitis, doubt joint infection as afebrile with ROM of nearly 90 deg.  Will check labs, XR, and US RLE r/o DVT (low suspicion, no clinical signs of DVT). Ortho consulted, continue IV abx.  To be admitted for possible surgical intervention.

## 2019-12-03 NOTE — ED PROVIDER NOTE - PATIENT PORTAL LINK FT
You can access the FollowMyHealth Patient Portal offered by [facility] by registering at the following website: [email or web address]. By joining [facility]’s FollowMyHealth portal, you will also be able to view your health information using other applications (apps) compatible with our system.

## 2019-12-03 NOTE — ED PROVIDER NOTE - NSFOLLOWUPINSTRUCTIONS_ED_ALL_ED_FT
Continue antibiotics via PICC line as prescribed.  Follow up with Dr Estes in 1-3 days.  Return to the Emergency Department if you have any new or worsening symptoms, or if you have any concerns.  _____________________________________________________________    CAST CARE - AfterCare(R) Instructions(ER/ED)     Cast Care    WHAT YOU NEED TO KNOW:    Cast care will help the cast dry and harden correctly, and then protect it until it comes off. Your cast may need up to 48 hours to dry and harden completely. Even after your cast hardens, it can be damaged.     DISCHARGE INSTRUCTIONS:    Return to the emergency department if:     Your cast breaks or gets damaged.       You see drainage, or your cast is stained or smells bad.       Your skin turns blue or pale.       Your skin tingles, burns, or is cold or numb.      You have severe pain that is getting worse and does not go away after you take pain medicine.      Your limb swells, or your cast looks or feels tighter than it was before.     Contact your healthcare provider if:     Something falls into your cast and gets stuck.      You have itching, pain, burning, or weakness where you have the cast.       You have a fever.       You have sores, blisters, or breaks on the skin around the edges of the cast.      You have questions or concerns about your condition or care.    Follow up with your healthcare provider as directed: You will need to return to have your cast removed and your bones checked. Write down your questions so you remember to ask them during your visits.    Care for your cast while it hardens:     Protect the cast. Do not put weight on the cast. Do not bend, lean on, or hit the cast with anything. Use the palms of your hands when you move the cast. Do not use your fingers. Your fingers may leave marks on the cast as it dries.      Change positions often. Change your position every 2 hours to help the cast dry faster. Prop your cast on something soft, such as a pillow, to prevent a flat area on your cast.       Keep the cast dry. Tie plastic trash bags around your cast to keep it dry while you bathe. You may use a blow dryer on cool or the lowest heat setting to dry your cast if it gets wet. Do not use a high heat setting, because you may burn your skin. Certain casts can get wet. Ask if you have a waterproof cast.    Care for your cast after it hardens:     Check your cast every day. Contact your healthcare provider if you notice any cracks, dents, holes, or flaking on your cast.       Keep your cast clean and dry. Cover your cast with a towel when you eat. You may have a small piece of cast that can be removed to check on incisions under your cast. Make sure the small piece of cast is kept tightly closed. If your cast gets dirty, use a mild detergent and a damp washcloth to wipe off the outside of your cast. Continue to cover your cast with trash bags to keep it dry while you bathe.       Care for the edges of your cast. Cover the cast edges to keep them smooth. Use 4 inch pieces of waterproof tape. Place one end of the tape under the inside edge of your cast and fold it over to the outside surface. Overlap tape strips until the edges are completely covered. Change the tape as directed. Do not pull or repair any of the padding from inside the cast. This could cause blisters and sores on the skin under your cast.       Keep weight off your cast. Do not let anyone push down or lean on your cast. This may cause it to break.      Do not use sharp objects. Do not use a sharp or pointed object to scratch under your cast. This may cause wounds that can get infected, or you may lose the item inside the cast. If your skin itches, blow cool air under the cast. You may also gently scratch your skin outside the cast with a cloth.

## 2019-12-03 NOTE — CONSULT NOTE ADULT - ASSESSMENT
A/P: 64yMale s/p R TKA c/b infection s/p I&D w poly exchange now on 1g vancomycin Q12    -Based on appearance of wound, history, downtrending inflammatory markers, patient does not warrant further operative intervention at this time  -Pt to receive evening dose 10pm vancomycin in ED w plan to be d/c'ed home afterwards  -Pt will f/u w Dr. Estes on Tuesday 12/10  -Maintain f/u w ID Dr. Saeed re: restarting rifampin and continued antibiotic mgmt  -WBAT w knee locked in extension  -Discussed splint care (keep clean/dry/intact, hygiene care, do not alter/remove, etc)  -Discussed with Dr. Estes    Ortho Pager [phone]

## 2019-12-03 NOTE — CONSULT NOTE ADULT - SUBJECTIVE AND OBJECTIVE BOX
Orthopaedic Surgery Consult Note    For Surgeon: Franklyn    HPI:  64yMale s/p R TKA 10/22 w periprosthetic infection s/p I&D w poly exchange 11/22       Allergies    ACE inhibitors (Hives)  enalapril (Hives)    Intolerances      PAST MEDICAL & SURGICAL HISTORY:  CHF (congestive heart failure)  HTN (hypertension)  Diabetes  Blood clot due to device, implant, or graft: was on blood thinners  HLD (hyperlipidemia)  Chronic systolic CHF (congestive heart failure)  Osteoarthritis  Atherosclerosis of coronary artery: CAD (coronary artery disease)  Status post percutaneous transluminal coronary angioplasty: in 2012  S/P CABG x 1: 2018  Stented coronary artery: 10/18 heart attack  INFERIOR WALL MI  Other postprocedural status: Fixation hardware in foot LEFT    MEDICATIONS  (STANDING):  acetaminophen   Tablet .. 650 milliGRAM(s) Oral every 6 hours  aspirin enteric coated 81 milliGRAM(s) Oral two times a day  atorvastatin 80 milliGRAM(s) Oral at bedtime  dextrose 5%. 1000 milliLiter(s) (50 mL/Hr) IV Continuous <Continuous>  dextrose 50% Injectable 12.5 Gram(s) IV Push once  dextrose 50% Injectable 25 Gram(s) IV Push once  dextrose 50% Injectable 25 Gram(s) IV Push once  DULoxetine 90 milliGRAM(s) Oral daily  insulin lispro (HumaLOG) corrective regimen sliding scale   SubCutaneous Before meals and at bedtime  metoprolol succinate  milliGRAM(s) Oral daily  pantoprazole    Tablet 40 milliGRAM(s) Oral before breakfast  polyethylene glycol 3350 17 Gram(s) Oral daily  vancomycin  IVPB 1000 milliGRAM(s) IV Intermittent once    MEDICATIONS  (PRN):  aluminum hydroxide/magnesium hydroxide/simethicone Suspension 30 milliLiter(s) Oral four times a day PRN Indigestion  dextrose 40% Gel 15 Gram(s) Oral once PRN Blood Glucose LESS THAN 70 milliGRAM(s)/deciliter  glucagon  Injectable 1 milliGRAM(s) IntraMuscular once PRN Glucose LESS THAN 70 milligrams/deciliter  HYDROmorphone  Injectable 0.5 milliGRAM(s) IV Push every 4 hours PRN Breakthrough Pain  magnesium hydroxide Suspension 30 milliLiter(s) Oral daily PRN Constipation  morphine  - Injectable 2 milliGRAM(s) IV Push every 4 hours PRN Severe Pain (7 - 10)  ondansetron Injectable 4 milliGRAM(s) IV Push every 6 hours PRN Nausea and/or Vomiting  oxyCODONE    IR 5 milliGRAM(s) Oral every 4 hours PRN Mild Pain (1 - 3)  oxyCODONE    IR 10 milliGRAM(s) Oral every 4 hours PRN Moderate Pain (4 - 6)  senna 2 Tablet(s) Oral at bedtime PRN Constipation      Vital Signs Last 24 Hrs  T(C): 36.9 (03 Dec 2019 20:50), Max: 36.9 (03 Dec 2019 20:50)  T(F): 98.4 (03 Dec 2019 20:50), Max: 98.4 (03 Dec 2019 20:50)  HR: 87 (03 Dec 2019 20:50) (87 - 90)  BP: 110/71 (03 Dec 2019 20:50) (103/63 - 110/71)  BP(mean): --  RR: 18 (03 Dec 2019 20:50) (18 - 18)  SpO2: 96% (03 Dec 2019 20:50) (95% - 96%)    Physical Exam:                          10.3   8.71  )-----------( 416      ( 03 Dec 2019 20:01 )             33.5     12-03    136  |  98  |  19  ----------------------------<  225<H>  4.6   |  26  |  1.07    Ca    8.4      03 Dec 2019 20:01    TPro  6.6  /  Alb  3.3  /  TBili  0.9  /  DBili  x   /  AST  67<H>  /  ALT  185<H>  /  AlkPhos  118  12-03    PT/INR - ( 03 Dec 2019 20:01 )   PT: 19.2 sec;   INR: 1.67          PTT - ( 03 Dec 2019 20:01 )  PTT:27.1 sec  Imaging:     A/P: 64yMale    -Discussed with  Orthopaedic Surgery Consult Note    For Surgeon: Jimmietall    HPI:  64yMale s/p R TKA 10/22 w periprosthetic infection s/p I&D w poly exchange 11/22 w PICC line on vancomycin recently d/c'ed from [facility] presents to ED after removing his Prevena dressing and was concerned w the appearance of his incision. Denies any fevers/chills/discharge from the incision. Pt was also discharged in knee immobilizer w instructions to remain in it to allow soft tissue healing but removed it in the cab on the way home and left it in there, has been without it since then. Denies any other symptoms at this time.      Allergies    ACE inhibitors (Hives)  enalapril (Hives)    Intolerances      PAST MEDICAL & SURGICAL HISTORY:  CHF (congestive heart failure)  HTN (hypertension)  Diabetes  Blood clot due to device, implant, or graft: was on blood thinners  HLD (hyperlipidemia)  Chronic systolic CHF (congestive heart failure)  Osteoarthritis  Atherosclerosis of coronary artery: CAD (coronary artery disease)  Status post percutaneous transluminal coronary angioplasty: in 2012  S/P CABG x 1: 2018  Stented coronary artery: 10/18 heart attack  INFERIOR WALL MI  Other postprocedural status: Fixation hardware in foot LEFT    MEDICATIONS  (STANDING):  acetaminophen   Tablet .. 650 milliGRAM(s) Oral every 6 hours  aspirin enteric coated 81 milliGRAM(s) Oral two times a day  atorvastatin 80 milliGRAM(s) Oral at bedtime  dextrose 5%. 1000 milliLiter(s) (50 mL/Hr) IV Continuous <Continuous>  dextrose 50% Injectable 12.5 Gram(s) IV Push once  dextrose 50% Injectable 25 Gram(s) IV Push once  dextrose 50% Injectable 25 Gram(s) IV Push once  DULoxetine 90 milliGRAM(s) Oral daily  insulin lispro (HumaLOG) corrective regimen sliding scale   SubCutaneous Before meals and at bedtime  metoprolol succinate  milliGRAM(s) Oral daily  pantoprazole    Tablet 40 milliGRAM(s) Oral before breakfast  polyethylene glycol 3350 17 Gram(s) Oral daily  vancomycin  IVPB 1000 milliGRAM(s) IV Intermittent once    MEDICATIONS  (PRN):  aluminum hydroxide/magnesium hydroxide/simethicone Suspension 30 milliLiter(s) Oral four times a day PRN Indigestion  dextrose 40% Gel 15 Gram(s) Oral once PRN Blood Glucose LESS THAN 70 milliGRAM(s)/deciliter  glucagon  Injectable 1 milliGRAM(s) IntraMuscular once PRN Glucose LESS THAN 70 milligrams/deciliter  HYDROmorphone  Injectable 0.5 milliGRAM(s) IV Push every 4 hours PRN Breakthrough Pain  magnesium hydroxide Suspension 30 milliLiter(s) Oral daily PRN Constipation  morphine  - Injectable 2 milliGRAM(s) IV Push every 4 hours PRN Severe Pain (7 - 10)  ondansetron Injectable 4 milliGRAM(s) IV Push every 6 hours PRN Nausea and/or Vomiting  oxyCODONE    IR 5 milliGRAM(s) Oral every 4 hours PRN Mild Pain (1 - 3)  oxyCODONE    IR 10 milliGRAM(s) Oral every 4 hours PRN Moderate Pain (4 - 6)  senna 2 Tablet(s) Oral at bedtime PRN Constipation      Vital Signs Last 24 Hrs  T(C): 36.9 (03 Dec 2019 20:50), Max: 36.9 (03 Dec 2019 20:50)  T(F): 98.4 (03 Dec 2019 20:50), Max: 98.4 (03 Dec 2019 20:50)  HR: 87 (03 Dec 2019 20:50) (87 - 90)  BP: 110/71 (03 Dec 2019 20:50) (103/63 - 110/71)  BP(mean): --  RR: 18 (03 Dec 2019 20:50) (18 - 18)  SpO2: 96% (03 Dec 2019 20:50) (95% - 96%)    Physical Exam:  VSS  General: Well-appearing middle aged male; NAD; A&Ox3; RUE PICC line in place  RLE: Midline TKA incision with moderate superficial skin necrosis and surrounding erythema; Appropriate nisa-incisional swelling and knee effusion; Mild warmth appropriate for postoperative state; No palpable discrete fluid pockets; No drainage expressible from the incision; No sinus tracts present; Moderate calf swelling consistent w postoperative state; NVI distally, Foot WWP, Cap refill <2sec                          10.3   8.71  )-----------( 416      ( 03 Dec 2019 20:01 )             33.5     12-03    136  |  98  |  19  ----------------------------<  225<H>  4.6   |  26  |  1.07    Ca    8.4      03 Dec 2019 20:01    TPro  6.6  /  Alb  3.3  /  TBili  0.9  /  DBili  x   /  AST  67<H>  /  ALT  185<H>  /  AlkPhos  118  12-03    PT/INR - ( 03 Dec 2019 20:01 )   PT: 19.2 sec;   INR: 1.67          PTT - ( 03 Dec 2019 20:01 )  PTT:27.1 sec    ESR 15 (down from 22)  CRP 4.85 (down from 11.19)    Imaging: R Knee XR shows R TKA w components in place    The patient was placed in a cylindrical splint with the knee in extension

## 2019-12-03 NOTE — ED PROVIDER NOTE - PROGRESS NOTE DETAILS
Ortho saw patient, feels wound appearance due to patient starting to ROM leg after removal of knee immobilizer; normal WBC and CRP improving.  To get his evening dose of IV vancomycin and ortho will cast the leg. Ortho saw patient, feels wound appearance due to patient starting to ROM leg after removal of knee immobilizer; normal WBC and CRP improving.  To get his evening dose of IV vancomycin and ortho will cast the leg.  To be discharged home.

## 2019-12-03 NOTE — ED ADULT NURSE NOTE - NSIMPLEMENTINTERV_GEN_ALL_ED
Implemented All Fall Risk Interventions:  [place] to call system. Call bell, personal items and telephone within reach. Instruct patient to call for assistance. Room bathroom lighting operational. Non-slip footwear when patient is off stretcher. Physically safe environment: no spills, clutter or unnecessary equipment. Stretcher in lowest position, wheels locked, appropriate side rails in place. Provide visual cue, wrist band, yellow gown, etc. Monitor gait and stability. Monitor for mental status changes and reorient to person, place, and time. Review medications for side effects contributing to fall risk. Reinforce activity limits and safety measures with patient and family.

## 2019-12-03 NOTE — ED ADULT NURSE REASSESSMENT NOTE - NS ED NURSE REASSESS COMMENT FT1
NICANOR Robledo called ortho to address patient's pain of 7/10. Ortho MD states he will put in orders for pain medication.

## 2019-12-03 NOTE — ED ADULT NURSE NOTE - OBJECTIVE STATEMENT
65 yo M c/o R knee pain. Pt had R knee replacement in Oct, was admitted last week for incision site dehiscence. Noted erythema, edema, and warmth to palpation at incision site. Pt denies drainage. Denies fever/chills, N/V/D. +Cap refill, ROM limited. Pedal pulse diminished, pt reports that is his baseline.

## 2019-12-04 DIAGNOSIS — I42.9 CARDIOMYOPATHY, UNSPECIFIED: ICD-10-CM

## 2019-12-04 DIAGNOSIS — E11.22 TYPE 2 DIABETES MELLITUS WITH DIABETIC CHRONIC KIDNEY DISEASE: ICD-10-CM

## 2019-12-04 DIAGNOSIS — T84.53XA INFECTION AND INFLAMMATORY REACTION DUE TO INTERNAL RIGHT KNEE PROSTHESIS, INITIAL ENCOUNTER: ICD-10-CM

## 2019-12-04 DIAGNOSIS — I25.10 ATHEROSCLEROTIC HEART DISEASE OF NATIVE CORONARY ARTERY WITHOUT ANGINA PECTORIS: ICD-10-CM

## 2019-12-04 DIAGNOSIS — N18.2 CHRONIC KIDNEY DISEASE, STAGE 2 (MILD): ICD-10-CM

## 2019-12-04 DIAGNOSIS — T81.32XA DISRUPTION OF INTERNAL OPERATION (SURGICAL) WOUND, NOT ELSEWHERE CLASSIFIED, INITIAL ENCOUNTER: ICD-10-CM

## 2019-12-04 DIAGNOSIS — E66.9 OBESITY, UNSPECIFIED: ICD-10-CM

## 2019-12-04 DIAGNOSIS — I50.22 CHRONIC SYSTOLIC (CONGESTIVE) HEART FAILURE: ICD-10-CM

## 2019-12-04 DIAGNOSIS — I27.20 PULMONARY HYPERTENSION, UNSPECIFIED: ICD-10-CM

## 2019-12-04 DIAGNOSIS — Z95.1 PRESENCE OF AORTOCORONARY BYPASS GRAFT: ICD-10-CM
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
## 2019-12-04 DIAGNOSIS — Z95.5 PRESENCE OF CORONARY ANGIOPLASTY IMPLANT AND GRAFT: ICD-10-CM

## 2019-12-04 DIAGNOSIS — E78.5 HYPERLIPIDEMIA, UNSPECIFIED: ICD-10-CM
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
## 2019-12-04 NOTE — ED ADULT TRIAGE NOTE - PAIN: PRESENCE, MLM
complains of pain/discomfort/Right knee [FreeTextEntry1] : Gastro-viral gastroenteritis-advised bland diet.  Avoid spicy and greasy foods. If diarrhea restarts than start the brat diet.  Check CBC complete metabolic and amylase.\par \par Neurology-  increased frequency of urination, hesitancy-check urinalysis reflex the culture is positive. Check KUB sonogram secondary to history of renal colic.  Check PSA levels.\par \par Cardiology- coronary artery disease-  advised patient to followup with cardiology. Reviewed EKG today\par \par Endo -  Adult BMI screening and followup:  The patient's BMI is about normal. Counseled patient regarding BMI, healthy eating, portion control and exercise importance of diet to maintain a healthy weight. \par Counseled patient on importance of exercise to maintain a healthy weight \par \par We discussed the importance of healthy lifestyles which include exercise, weight control and good diet.\par Patient's questions were answered in full detail. Advise patient if any other concerns arise to please call office to have a discussion. \par \par

## 2019-12-05 ENCOUNTER — APPOINTMENT (OUTPATIENT)
Dept: ORTHOPEDIC SURGERY | Facility: CLINIC | Age: 64
End: 2019-12-05

## 2019-12-06 DIAGNOSIS — E78.5 HYPERLIPIDEMIA, UNSPECIFIED: ICD-10-CM

## 2019-12-06 DIAGNOSIS — Z47.89 ENCOUNTER FOR OTHER ORTHOPEDIC AFTERCARE: ICD-10-CM

## 2019-12-06 DIAGNOSIS — Z95.1 PRESENCE OF AORTOCORONARY BYPASS GRAFT: ICD-10-CM

## 2019-12-06 DIAGNOSIS — I50.22 CHRONIC SYSTOLIC (CONGESTIVE) HEART FAILURE: ICD-10-CM

## 2019-12-06 DIAGNOSIS — M25.561 PAIN IN RIGHT KNEE: ICD-10-CM

## 2019-12-06 DIAGNOSIS — E11.9 TYPE 2 DIABETES MELLITUS WITHOUT COMPLICATIONS: ICD-10-CM

## 2019-12-06 DIAGNOSIS — I11.0 HYPERTENSIVE HEART DISEASE WITH HEART FAILURE: ICD-10-CM

## 2019-12-06 DIAGNOSIS — Z79.82 LONG TERM (CURRENT) USE OF ASPIRIN: ICD-10-CM

## 2019-12-06 DIAGNOSIS — I25.10 ATHEROSCLEROTIC HEART DISEASE OF NATIVE CORONARY ARTERY WITHOUT ANGINA PECTORIS: ICD-10-CM

## 2019-12-06 DIAGNOSIS — Z95.5 PRESENCE OF CORONARY ANGIOPLASTY IMPLANT AND GRAFT: ICD-10-CM

## 2019-12-06 PROCEDURE — 93005 ELECTROCARDIOGRAM TRACING: CPT

## 2019-12-06 PROCEDURE — 82962 GLUCOSE BLOOD TEST: CPT

## 2019-12-06 PROCEDURE — 86900 BLOOD TYPING SEROLOGIC ABO: CPT

## 2019-12-06 PROCEDURE — 80076 HEPATIC FUNCTION PANEL: CPT

## 2019-12-06 PROCEDURE — 88305 TISSUE EXAM BY PATHOLOGIST: CPT

## 2019-12-06 PROCEDURE — 86140 C-REACTIVE PROTEIN: CPT

## 2019-12-06 PROCEDURE — 85025 COMPLETE CBC W/AUTO DIFF WBC: CPT

## 2019-12-06 PROCEDURE — 80202 ASSAY OF VANCOMYCIN: CPT

## 2019-12-06 PROCEDURE — 97161 PT EVAL LOW COMPLEX 20 MIN: CPT

## 2019-12-06 PROCEDURE — 80061 LIPID PANEL: CPT

## 2019-12-06 PROCEDURE — 99285 EMERGENCY DEPT VISIT HI MDM: CPT | Mod: 25

## 2019-12-06 PROCEDURE — 97162 PT EVAL MOD COMPLEX 30 MIN: CPT

## 2019-12-06 PROCEDURE — 36573 INSJ PICC RS&I 5 YR+: CPT

## 2019-12-06 PROCEDURE — 86901 BLOOD TYPING SEROLOGIC RH(D): CPT

## 2019-12-06 PROCEDURE — 87186 SC STD MICRODIL/AGAR DIL: CPT

## 2019-12-06 PROCEDURE — 87070 CULTURE OTHR SPECIMN AEROBIC: CPT

## 2019-12-06 PROCEDURE — 85027 COMPLETE CBC AUTOMATED: CPT

## 2019-12-06 PROCEDURE — 80048 BASIC METABOLIC PNL TOTAL CA: CPT

## 2019-12-06 PROCEDURE — 85610 PROTHROMBIN TIME: CPT

## 2019-12-06 PROCEDURE — 97116 GAIT TRAINING THERAPY: CPT

## 2019-12-06 PROCEDURE — 85652 RBC SED RATE AUTOMATED: CPT

## 2019-12-06 PROCEDURE — 36415 COLL VENOUS BLD VENIPUNCTURE: CPT

## 2019-12-06 PROCEDURE — C1776: CPT

## 2019-12-06 PROCEDURE — 80053 COMPREHEN METABOLIC PANEL: CPT

## 2019-12-06 PROCEDURE — 87040 BLOOD CULTURE FOR BACTERIA: CPT

## 2019-12-06 PROCEDURE — 87075 CULTR BACTERIA EXCEPT BLOOD: CPT

## 2019-12-06 PROCEDURE — 85730 THROMBOPLASTIN TIME PARTIAL: CPT

## 2019-12-06 PROCEDURE — 86850 RBC ANTIBODY SCREEN: CPT

## 2019-12-06 PROCEDURE — 73560 X-RAY EXAM OF KNEE 1 OR 2: CPT

## 2019-12-09 NOTE — PHYSICAL THERAPY INITIAL EVALUATION ADULT - SITTING BALANCE: STATIC
Patient: Jacky Bailey Date: 2019   : 1953 Attending: Estela Murphy MD   66 year old female        Chiefcomplaint: management of ESRD, has a nonfunctional AVF, last full run 19    Pt seen at the request of Dr. Carranza    HPI:  67 yo F with ESRD for which she is on HD TTS at [facility] [place] on [address] under the care of our colleague Dr. Weathers.   EDW 85.5kg, treatment time 3 hours, F180. Recent PD catheter removal 2019. Has an AVF which has been nonfunctional lately and last full run was on 19. On Sat (19) HD run ok, Tuesday didn't get HD - AVF found to be nonfxnal, Wednesday went for  fistulogram then had 1.5 hrs HD afterwards expecting to get a full run on Thursday, however on Thursday AVF was again nonfxnal, Friday went for fistulogram, Saturday am started having ST swelling neck and tongue and started having progressive SOB and difficulty swallowing hence the presentation to ER. L AVF at this time without bruit/thrill  Not able to talk well due to tongue sweling. Looks comfortable at this time    19 : Pt is less SOB, tongue swelling is better. Denies any nausea or vomiting.      Past Medical History:   Diagnosis Date   • Anemia    • Anxiety    • Depression    • Diabetes (CMS/[facility])    • ESRD (end stage renal disease) on dialysis (CMS/[facility])    • Essential (primary) hypertension    • Hemodialysis patient (CMS/[facility])     tiffanie Gil, Th, Sat   • High cholesterol    • Snoring    • Urge incontinence     R/T stool; per patient report   • Uses walker     4 wheeled   • Vitamin D deficiency    • Wears glasses     reading       Past Surgical History:   Procedure Laterality Date   • Av graft-fistula angiogram/possible intervention     • Colonoscopy diagnostic  2017    tubular polyp 3yr recall    • Ct guided needle placement  2018    bone marrow biopsy   • Esophagogastroduodenoscopy  2017   • Multiple tooth extractions         ALLERGIES:   Allergen Reactions   • Amlodipine  SWELLING     Pt reports tongue/lip swelling with amlodipine (no longer taking it)   • Chlorhexidine Gluconate RASH     Rash and itching       Medications/Infusions:  Medications Prior to Admission   Medication Sig Dispense Refill   • Iron Sucrose (VENOFER IV) Inject 50 mg into the vein 1 day a week.     • ondansetron (ZOFRAN ODT) 4 MG disintegrating tablet Place 4 mg onto the tongue as needed for Nausea.     • B Complex-C-Folic Acid (DENIS-TODD PO) Take 1 tablet by mouth daily.      • CALCIUM CARBONATE PO Take 750 mg by mouth 2 times daily as needed.      • acetaminophen (TYLENOL) 325 MG tablet Take 650 mg by mouth 3 times daily as needed for Pain.     • carvedilol (COREG) 25 MG tablet Take 1 tablet by mouth 2 times daily. 60 tablet 0   • Doxercalciferol (HECTOROL IV) Inject 14 mcg into the vein 3 days a week. Administered at dialysis on Tuesday, Thursday, and Saturday.      • ergocalciferol (DRISDOL) 77743 units capsule Take 1 capsule by mouth once a week. (Patient taking differently: Take 50,000 Units by mouth 1 day a week. Taken on Thursdays) 12 capsule 0   • loperamide (IMODIUM) 2 MG capsule Take 2 mg by mouth 4 times daily as needed for Diarrhea.     • polyethylene glycol (GLYCOLAX, MIRALAX) packet Take 17 g by mouth daily. 14 each 0   • docusate sodium-sennosides (SENOKOT S) 50-8.6 MG per tablet Take 2 tablets by mouth daily as needed for Constipation. 20 tablet 0   • diphenhydrAMINE (BENADRYL) 25 MG capsule Take 25 mg by mouth every 6 hours as needed for Itching.     • Methoxy PEG-Epoetin Beta (MIRCERA IJ) Inject 100 mcg as directed every 14 days. Receives at dialysis          Scheduled:   • methylPREDNISolone  81.25 mg Intravenous Q12H   • famotidine  20 mg Intravenous Daily   • [Held by provider] carvedilol  25 mg Oral 2 times per day   • sodium chloride (PF)  2 mL Intracatheter 2 times per day   • heparin (porcine)  5,000 Units Subcutaneous 3 times per day       Continuous Infusions:   • sodium chloride  0.9% infusion     • sodium chloride 0.9% infusion     • sodium chloride 0.9% infusion Stopped (12/08/19 8544)       Social History     Socioeconomic History   • Marital status: /Civil Union     Spouse name: Not on file   • Number of children: Not on file   • Years of education: Not on file   • Highest education level: Not on file   Occupational History   • Not on file   Social Needs   • Financial resource strain: Not on file   • Food insecurity:     Worry: Not on file     Inability: Not on file   • Transportation needs:     Medical: Not on file     Non-medical: Not on file   Tobacco Use   • Smoking status: Never Smoker   • Smokeless tobacco: Never Used   Substance and Sexual Activity   • Alcohol use: No     Alcohol/week: 0.0 standard drinks     Frequency: Never     Drinks per session: 1 or 2     Binge frequency: Never   • Drug use: No   • Sexual activity: Not on file   Lifestyle   • Physical activity:     Days per week: Not on file     Minutes per session: Not on file   • Stress: Not on file   Relationships   • Social connections:     Talks on phone: Not on file     Gets together: Not on file     Attends Mandaeism service: Not on file     Active member of club or organization: Not on file     Attends meetings of clubs or organizations: Not on file     Relationship status: Not on file   • Intimate partner violence:     Fear of current or ex partner: Not on file     Emotionally abused: Not on file     Physically abused: Not on file     Forced sexual activity: Not on file   Other Topics Concern   • Not on file   Social History Narrative   • Not on file       Family History   Problem Relation Age of Onset   • Diabetes Brother    • Diabetes Mother    • Heart disease Mother    • Myocardial Infarction Mother    • Asthma Father    • Rheumatoid Arthritis Brother    • Hypertension Brother        ROS: rest of ROS reviewed and negative except for what is in the HPI      Vital Last Value 24 Hour Range   Temperature 97.8  °F (36.6 °C) Temp  Min: 97.8 °F (36.6 °C)  Max: 99.3 °F (37.4 °C)   Pulse 86 Pulse  Min: 72  Max: 92   Respiratory 19 Resp  Min: 12  Max: 25   Blood Pressure 133/60 BP  Min: 110/60  Max: 155/72   Art BP   No data recorded   Pulse Oximetry 95 % SpO2  Min: 95 %  Max: 98 %     Vital Today Admitted   Weight 81.2 kg Weight: 85.9 kg   Height N/A Height: 5' 5\" (165.1 cm)   BMI N/A BMI (Calculated): 31.51     Weight over the past 48 Hours:  Patient Vitals for the past 48 hrs:   Weight   12/08/19 0100 85.9 kg   12/08/19 1930 82 kg   12/08/19 2200 81 kg   12/09/19 0600 81.2 kg        Hemodynamics:      Last Value 24 Hour Range   CVP   No data recorded   PAS/PAD   No data recorded   PCWP   No data recorded   CO   No data recorded   CI   No data recorded   SVR   No data recorded   SV02   No data recorded     Intake/Output:    Last Stool Occurrence:      No intake/output data recorded.    I/O last 3 completed shifts:  In: 0   Out: 1150 [Urine:150; Other:1000]      Intake/Output Summary (Last 24 hours) at 12/9/2019 1057  Last data filed at 12/8/2019 2200  Gross per 24 hour   Intake 0 ml   Output 1150 ml   Net -1150 ml         Physical Exam:  Gen: Awake,alert, not in acute distress  Headand Eyes: atraumatic, normocephalic, pink conjunctivae, anicteric sclerae, pupils equal, reactive to light  ENT: moist oral mucosae, no external ear and nose lesions, edematous tongue, protruding.  Neck: supple neck, trachea midline  C/L:  decreased BS bases symmetric chest expansion  CVS: regular rate and rhythm, no rubs  Abd: soft, nontender, no hepatosplenomegaly  Ext: no cyanosis, clubbing, + edema, no malalignment LAVF: no thrill/bruit  Skin: warm, dry, intact, no rash or nodules  Neuro: A, O x 3, EOMI, non-focal, moves extremities spontaneously    Laboratory Results:    Lab Results   Component Value Date    POTASSIUM 5.5 (H) 12/09/2019     Lab Results   Component Value Date    INR 1.1 10/06/2019    PTT 36 (H) 10/05/2019    VB12 428 01/19/2018     CRP 9.0 (H) 10/03/2018    C3 74 (L) 04/12/2017    C4 27.2 04/12/2017    INTAC 456 (H) 10/08/2019    PST 14 (L) 09/15/2018    FERR 407 (H) 05/23/2018    HGB 11.9 (L) 12/09/2019    TP 5.5 (L) 04/12/2017    ALBUMIN 2.6 (L) 12/08/2019    LIPA 127 10/24/2019    LACTA 1.6 10/03/2018       Urine Panel  Lab Results   Component Value Date    UNITR NEGATIVE 12/07/2019    UCR 57.21 03/15/2017    UKET NEGATIVE 12/07/2019    USPG 1.027 12/07/2019    UPROT >300 (A) 12/07/2019    UWBC SMALL (A) 12/07/2019    URBC LARGE (A) 12/07/2019    UBILI NEGATIVE 12/07/2019    UPH 8.0 (H) 12/07/2019    UBACTR NONE SEEN 12/07/2019       Recent Labs     12/07/19  1320 12/08/19  0520 12/08/19  0954 12/09/19  0400   SODIUM 134*  --  133* 133*   POTASSIUM 5.6*  --  6.0* 5.5*   CHLORIDE 102  --  100 100   CO2 21  --  18* 16*   ANIONGAP 17  --  21* 22*   BUN 71*  --  80* 81*   CREATININE 8.25*  --  9.07* 8.54*   GFRA 5  --  5 5   GFRNA 5  --  4 4   GLUCOSE 95  --  102* 125*   CALCIUM 8.2*  --  8.1* 8.6   MG  --  2.4  --   --    ALKPT 122*  --  107  --    BILIRUBIN 0.3  --  0.3  --    AST 33  --  15  --    GPT 10  --  <6  --    WBC 12.1* 13.9*  --  10.1   HGB 10.9* 11.8*  --  11.9*   HCT 36.2 38.6  --  39.4    322  --  280         Diagnosis/Plan:  · ESRD - HD on TTS schedule as OP  · I have discussed risk and benefits of hemodialysis and informed consent obtained  · EDW 85.5 kg, 3 hrs F 180  · Access- LAVF clotted again- will consult her sx  · Will need PC - however, ENT wants to closely monitor in ICU at this time- hence has lucina for RRT  · HTN - monitor with fluid removal  · Angioedema- ENT consulted    Dialysis note : Being dialyzed with :    Estimated Dry Weight (kg) (cannot be 0 kg) 85.8    Type of Therapy Hemodialysis    Access to be utilized Catheter    Blood Flow 300 - 400 mL/min    Titrate to pressures (arterial and venous) for maximum flow rate    Dialysate Potassium 2    Dialysate Calcium 2.5    Dialysate Sodium 138     Dialysate Bicarbonate 30    Dialysate Flow Rate 700 mL/min    Dialysate Temperature 35.5 degrees Celsius    Hemodialysis Duration (hours) 3.5    Hemodialysis Weight Loss (kg as tolerated) 2 - 3    Ultrafiltration Profile No    Maintain Pressures SBP    Maintain SBP greater than (mmHg) 90    Dialyzer F160    Heparin Ordered No           fair balance

## 2019-12-10 ENCOUNTER — APPOINTMENT (OUTPATIENT)
Dept: ORTHOPEDIC SURGERY | Facility: CLINIC | Age: 64
End: 2019-12-10
Payer: COMMERCIAL

## 2019-12-10 PROCEDURE — 99024 POSTOP FOLLOW-UP VISIT: CPT

## 2019-12-10 NOTE — END OF VISIT
[FreeTextEntry3] : All medical record entries made by Shahrzad Urban acting as a scribe for the performing provider (Santos Estes MD and/or PACHECO Iraheta) on 12/10/2019. All entries were dictated to me by the performing medical provider. In signing this record, the medical provider affirms that they have personally performed the history, physical exam, assessment and plan and have also directed, reviewed and agreed to the documentation in the chart.

## 2019-12-10 NOTE — HISTORY OF PRESENT ILLNESS
[Chills] : no chills [de-identified] : 64 year old male presents for post op and wound check. He reports mild, occasional right knee pain. Patient was put in a knee brace in the ER but reports that he took it off AMA when he got home because it was cutting into his right heel and causing discomfort. He reports that he's been lying flat in bed with his right leg extended. He has been applying Silvadene to the incision. Patient is on antibiotics but has not gone back on rifampin per recommendation by his ID specialist, Dr. Saeed. He reports that he had his blood drawn within the last few days. Patient can walk less than 5 blocks.  [Fever] : no fever [de-identified] : Patient is alert and oriented x3.\par Using cane. \par Right knee: Proximal and mid-incision with skin necrosis and granulation tissue, improved from previous ER visit, with surrounding erythema. No active drainage. Acceptable post-op swelling. \par Calf is soft and nontender.\par NVI. [de-identified] : post op and wound check s/p R TKA 10/2/19 [de-identified] : Mr. Blas presents for wound check. We discussed how he should not have taken off his right knee brace/splint and that he should have contacted me or my office so that we could help him manage his care in the best way possible. I explained that without the brace he is allowing the knee to bend too much, which is putting too much tension on his wound. I advised patient to keep the knee immobilizer on at all times because he is at risk of this operation failing due to wound breakdown. We discussed how his superficial skin necrosis and surrounding erythema make him vulnerable to wound breakdown and infection. I advised him to do sponge baths rather than showering so that he does not have to take off the immobilizer. I also recommended that he lie down with the leg extended and a few pillows under his right heel so that he can work on fully extending the leg. I cleaned the incision with alcohol, applied Silvadene, and then wrapped the right knee with gauze and an ACE bandage. I advised patient to apply Silvadene to the incision BID. He should continue with his course of antibiotics. I informed patient that if his tissues do not heal, I may refer him to a plastic surgeon in the future.\par Follow up on December 19 (9 days). [de-identified] : No new imaging was reviewed at this time.\par

## 2019-12-12 ENCOUNTER — INPATIENT (INPATIENT)
Facility: HOSPITAL | Age: 64
LOS: 3 days | Discharge: HOME HEALTH | End: 2019-12-16
Attending: INTERNAL MEDICINE | Admitting: INTERNAL MEDICINE
Payer: COMMERCIAL

## 2019-12-12 VITALS
HEIGHT: 73 IN | HEART RATE: 70 BPM | TEMPERATURE: 97 F | RESPIRATION RATE: 18 BRPM | DIASTOLIC BLOOD PRESSURE: 68 MMHG | SYSTOLIC BLOOD PRESSURE: 116 MMHG | OXYGEN SATURATION: 100 %

## 2019-12-12 DIAGNOSIS — Z95.5 PRESENCE OF CORONARY ANGIOPLASTY IMPLANT AND GRAFT: Chronic | ICD-10-CM

## 2019-12-12 DIAGNOSIS — Z88.8 ALLERGY STATUS TO OTHER DRUGS, MEDICAMENTS AND BIOLOGICAL SUBSTANCES: ICD-10-CM

## 2019-12-12 DIAGNOSIS — Z95.1 PRESENCE OF AORTOCORONARY BYPASS GRAFT: Chronic | ICD-10-CM

## 2019-12-12 DIAGNOSIS — Z96.651 PRESENCE OF RIGHT ARTIFICIAL KNEE JOINT: Chronic | ICD-10-CM

## 2019-12-12 LAB
ALBUMIN SERPL ELPH-MCNC: 3.5 G/DL — SIGNIFICANT CHANGE UP (ref 3.5–5.2)
ALP SERPL-CCNC: 102 U/L — SIGNIFICANT CHANGE UP (ref 30–115)
ALT FLD-CCNC: 54 U/L — HIGH (ref 0–41)
ANION GAP SERPL CALC-SCNC: 16 MMOL/L — HIGH (ref 7–14)
AST SERPL-CCNC: 55 U/L — HIGH (ref 0–41)
BASOPHILS # BLD AUTO: 0.11 K/UL — SIGNIFICANT CHANGE UP (ref 0–0.2)
BASOPHILS NFR BLD AUTO: 1.6 % — HIGH (ref 0–1)
BILIRUB SERPL-MCNC: 0.9 MG/DL — SIGNIFICANT CHANGE UP (ref 0.2–1.2)
BUN SERPL-MCNC: 26 MG/DL — HIGH (ref 10–20)
CALCIUM SERPL-MCNC: 8.9 MG/DL — SIGNIFICANT CHANGE UP (ref 8.5–10.1)
CHLORIDE SERPL-SCNC: 100 MMOL/L — SIGNIFICANT CHANGE UP (ref 98–110)
CO2 SERPL-SCNC: 20 MMOL/L — SIGNIFICANT CHANGE UP (ref 17–32)
CREAT SERPL-MCNC: 1.2 MG/DL — SIGNIFICANT CHANGE UP (ref 0.7–1.5)
EOSINOPHIL # BLD AUTO: 0.49 K/UL — SIGNIFICANT CHANGE UP (ref 0–0.7)
EOSINOPHIL NFR BLD AUTO: 7.3 % — SIGNIFICANT CHANGE UP (ref 0–8)
GLUCOSE BLDC GLUCOMTR-MCNC: 138 MG/DL — HIGH (ref 70–99)
GLUCOSE BLDC GLUCOMTR-MCNC: 212 MG/DL — HIGH (ref 70–99)
GLUCOSE SERPL-MCNC: 89 MG/DL — SIGNIFICANT CHANGE UP (ref 70–99)
HCT VFR BLD CALC: 37.7 % — LOW (ref 42–52)
HGB BLD-MCNC: 11.1 G/DL — LOW (ref 14–18)
IMM GRANULOCYTES NFR BLD AUTO: 0.6 % — HIGH (ref 0.1–0.3)
LYMPHOCYTES # BLD AUTO: 0.92 K/UL — LOW (ref 1.2–3.4)
LYMPHOCYTES # BLD AUTO: 13.6 % — LOW (ref 20.5–51.1)
MAGNESIUM SERPL-MCNC: 2.1 MG/DL — SIGNIFICANT CHANGE UP (ref 1.8–2.4)
MCHC RBC-ENTMCNC: 24.1 PG — LOW (ref 27–31)
MCHC RBC-ENTMCNC: 29.4 G/DL — LOW (ref 32–37)
MCV RBC AUTO: 81.8 FL — SIGNIFICANT CHANGE UP (ref 80–94)
MONOCYTES # BLD AUTO: 0.95 K/UL — HIGH (ref 0.1–0.6)
MONOCYTES NFR BLD AUTO: 14.1 % — HIGH (ref 1.7–9.3)
NEUTROPHILS # BLD AUTO: 4.24 K/UL — SIGNIFICANT CHANGE UP (ref 1.4–6.5)
NEUTROPHILS NFR BLD AUTO: 62.8 % — SIGNIFICANT CHANGE UP (ref 42.2–75.2)
NRBC # BLD: 0 /100 WBCS — SIGNIFICANT CHANGE UP (ref 0–0)
NT-PROBNP SERPL-SCNC: 6292 PG/ML — HIGH (ref 0–300)
PLATELET # BLD AUTO: 420 K/UL — HIGH (ref 130–400)
POTASSIUM SERPL-MCNC: 5.6 MMOL/L — HIGH (ref 3.5–5)
POTASSIUM SERPL-SCNC: 5.6 MMOL/L — HIGH (ref 3.5–5)
PROT SERPL-MCNC: 6.9 G/DL — SIGNIFICANT CHANGE UP (ref 6–8)
RBC # BLD: 4.61 M/UL — LOW (ref 4.7–6.1)
RBC # FLD: 18.9 % — HIGH (ref 11.5–14.5)
SODIUM SERPL-SCNC: 136 MMOL/L — SIGNIFICANT CHANGE UP (ref 135–146)
TROPONIN T SERPL-MCNC: 0.03 NG/ML — CRITICAL HIGH
TROPONIN T SERPL-MCNC: 0.04 NG/ML — CRITICAL HIGH
WBC # BLD: 6.75 K/UL — SIGNIFICANT CHANGE UP (ref 4.8–10.8)
WBC # FLD AUTO: 6.75 K/UL — SIGNIFICANT CHANGE UP (ref 4.8–10.8)

## 2019-12-12 PROCEDURE — 99222 1ST HOSP IP/OBS MODERATE 55: CPT

## 2019-12-12 PROCEDURE — 99285 EMERGENCY DEPT VISIT HI MDM: CPT

## 2019-12-12 PROCEDURE — 71045 X-RAY EXAM CHEST 1 VIEW: CPT | Mod: 26

## 2019-12-12 PROCEDURE — 93970 EXTREMITY STUDY: CPT | Mod: 26

## 2019-12-12 PROCEDURE — 93010 ELECTROCARDIOGRAM REPORT: CPT

## 2019-12-12 RX ORDER — ENOXAPARIN SODIUM 100 MG/ML
40 INJECTION SUBCUTANEOUS DAILY
Refills: 0 | Status: DISCONTINUED | OUTPATIENT
Start: 2019-12-12 | End: 2019-12-16

## 2019-12-12 RX ORDER — PRASUGREL 5 MG/1
10 TABLET, FILM COATED ORAL DAILY
Refills: 0 | Status: DISCONTINUED | OUTPATIENT
Start: 2019-12-12 | End: 2019-12-16

## 2019-12-12 RX ORDER — VANCOMYCIN HCL 1 G
1000 VIAL (EA) INTRAVENOUS EVERY 12 HOURS
Refills: 0 | Status: DISCONTINUED | OUTPATIENT
Start: 2019-12-12 | End: 2019-12-16

## 2019-12-12 RX ORDER — FUROSEMIDE 40 MG
40 TABLET ORAL DAILY
Refills: 0 | Status: DISCONTINUED | OUTPATIENT
Start: 2019-12-13 | End: 2019-12-16

## 2019-12-12 RX ORDER — FUROSEMIDE 40 MG
40 TABLET ORAL ONCE
Refills: 0 | Status: COMPLETED | OUTPATIENT
Start: 2019-12-12 | End: 2019-12-12

## 2019-12-12 RX ORDER — ACETAMINOPHEN 500 MG
650 TABLET ORAL EVERY 6 HOURS
Refills: 0 | Status: DISCONTINUED | OUTPATIENT
Start: 2019-12-12 | End: 2019-12-16

## 2019-12-12 RX ORDER — METOPROLOL TARTRATE 50 MG
100 TABLET ORAL DAILY
Refills: 0 | Status: DISCONTINUED | OUTPATIENT
Start: 2019-12-12 | End: 2019-12-16

## 2019-12-12 RX ORDER — ONDANSETRON 8 MG/1
4 TABLET, FILM COATED ORAL ONCE
Refills: 0 | Status: COMPLETED | OUTPATIENT
Start: 2019-12-12 | End: 2019-12-12

## 2019-12-12 RX ORDER — SENNA PLUS 8.6 MG/1
2 TABLET ORAL AT BEDTIME
Refills: 0 | Status: DISCONTINUED | OUTPATIENT
Start: 2019-12-12 | End: 2019-12-16

## 2019-12-12 RX ORDER — ASPIRIN/CALCIUM CARB/MAGNESIUM 324 MG
81 TABLET ORAL DAILY
Refills: 0 | Status: DISCONTINUED | OUTPATIENT
Start: 2019-12-12 | End: 2019-12-16

## 2019-12-12 RX ORDER — CHLORHEXIDINE GLUCONATE 213 G/1000ML
1 SOLUTION TOPICAL
Refills: 0 | Status: DISCONTINUED | OUTPATIENT
Start: 2019-12-12 | End: 2019-12-16

## 2019-12-12 RX ORDER — PANTOPRAZOLE SODIUM 20 MG/1
40 TABLET, DELAYED RELEASE ORAL
Refills: 0 | Status: DISCONTINUED | OUTPATIENT
Start: 2019-12-12 | End: 2019-12-16

## 2019-12-12 RX ORDER — POLYETHYLENE GLYCOL 3350 17 G/17G
17 POWDER, FOR SOLUTION ORAL DAILY
Refills: 0 | Status: DISCONTINUED | OUTPATIENT
Start: 2019-12-12 | End: 2019-12-16

## 2019-12-12 RX ORDER — OXYCODONE AND ACETAMINOPHEN 5; 325 MG/1; MG/1
1 TABLET ORAL EVERY 6 HOURS
Refills: 0 | Status: DISCONTINUED | OUTPATIENT
Start: 2019-12-12 | End: 2019-12-16

## 2019-12-12 RX ORDER — DULOXETINE HYDROCHLORIDE 30 MG/1
90 CAPSULE, DELAYED RELEASE ORAL DAILY
Refills: 0 | Status: DISCONTINUED | OUTPATIENT
Start: 2019-12-12 | End: 2019-12-16

## 2019-12-12 RX ADMIN — Medication 250 MILLIGRAM(S): at 18:28

## 2019-12-12 RX ADMIN — ENOXAPARIN SODIUM 40 MILLIGRAM(S): 100 INJECTION SUBCUTANEOUS at 13:59

## 2019-12-12 RX ADMIN — OXYCODONE AND ACETAMINOPHEN 1 TABLET(S): 5; 325 TABLET ORAL at 23:40

## 2019-12-12 RX ADMIN — ONDANSETRON 4 MILLIGRAM(S): 8 TABLET, FILM COATED ORAL at 09:56

## 2019-12-12 RX ADMIN — OXYCODONE AND ACETAMINOPHEN 1 TABLET(S): 5; 325 TABLET ORAL at 22:35

## 2019-12-12 RX ADMIN — PRASUGREL 10 MILLIGRAM(S): 5 TABLET, FILM COATED ORAL at 14:00

## 2019-12-12 RX ADMIN — DULOXETINE HYDROCHLORIDE 90 MILLIGRAM(S): 30 CAPSULE, DELAYED RELEASE ORAL at 13:59

## 2019-12-12 RX ADMIN — Medication 81 MILLIGRAM(S): at 14:01

## 2019-12-12 RX ADMIN — Medication 40 MILLIGRAM(S): at 09:06

## 2019-12-12 NOTE — H&P ADULT - NSICDXPASTSURGICALHX_GEN_ALL_CORE_FT
PAST SURGICAL HISTORY:  Other postprocedural status Fixation hardware in foot LEFT    S/P CABG x 1 2018    S/P TKR (total knee replacement), right     Stented coronary artery 10/18 heart attack  INFERIOR WALL MI

## 2019-12-12 NOTE — PATIENT PROFILE ADULT - OVER THE PAST TWO WEEKS, HAVE YOU FELT LITTLE INTEREST OR PLEASURE IN DOING THINGS?
Telephone Encounter by Maksim Brown MD at 11/15/17 08:04 AM     Author:  Maksim Brown MD Service:  (none) Author Type:  Physician     Filed:  11/15/17 08:05 AM Encounter Date:  11/13/2017 Status:  Signed     :  Maksim Brown MD (Physician)            Since she is in the [facility] system she [facility] to be [facility][MM1.1M]      Revision History        User Key Date/Time User Provider Type Action    > MM1.1 11/15/17 08:05 AM Maksim Brown MD Physician Sign    M - Manual             no

## 2019-12-12 NOTE — H&P ADULT - ATTENDING COMMENTS
Patient seen and examined independently. Resident's H & P reviewed. Agree with the findings and plan of care except,     A 64 years old male presented to the ED with progressively worsening sob for the last few days.     BNP: 6292  Trop: 0.03-->0.04  CXR: NAPD  EKG:   Venous doppler LE: No DVT

## 2019-12-12 NOTE — ED ADULT NURSE REASSESSMENT NOTE - NS ED NURSE REASSESS COMMENT FT1
report given to new primary nurse 3 C nurse NICANOR Tamayo , pt denies any chest pain   , denies abdominal pain , no labored breathing , no accessory muscles  used , no grimaced face noted , no SOB , denies headache , denies dizziness , no vomiting noted , awaiting for transport to 3 C room 14 bed A

## 2019-12-12 NOTE — H&P ADULT - HISTORY OF PRESENT ILLNESS
65 year old male with PMH of CAD, MI in 2018 s/p PCI (RCA stent with restenosis while on Plavix) on DAPT, DM II, DLD, Celiac, HFrEF  s/p Right TKA on 10/21/19 complicated by surgical site infection admitted to [facility] 11/22-11/26 s/p washout and on PICC antibiotics for ORSA (last day 1/6/2020) presents to the ED complaining dyspnea and orthopnea. Patient reports that he was dyspneic for the past couple of days so he took extra 20 mg of Lasix and last night he woke up feeling very woozy and lightheaded so he presented to the ED. Pt reports compliance with diet and meds. Echo 12/2018 showed EF of 40-45% mild pulm HTN and repeat echo 10/24/19 showed EF of 30% and severe pulm HTN.     In ED: Hemodynamically stable. Troponin 0.03, pBNP 6292. EKG with ventricular arrhythmia/block (unchanged from prior). Duplex lower extremity negative. CXR with vascular congestion but unchanged from prior (pending report)  Administered Lasix 40 mg IV once, Zofran 4 mg IV once 64 year old male with PMH of CAD, MI in 2018 s/p PCI (RCA stent with restenosis while on Plavix) on DAPT, DM II, DLD, Celiac, HFrEF  s/p Right TKA on 10/21/19 complicated by surgical site infection admitted to [facility] 11/22-11/26 s/p washout and on PICC antibiotics for ORSA (last day 1/6/2020) presents to the ED complaining dyspnea and orthopnea. Patient reports that he was dyspneic for the past couple of days so he took extra 20 mg of Lasix and last night he woke up feeling very woozy and lightheaded so he presented to the ED. Pt reports compliance with diet and meds. Echo 12/2018 showed EF of 40-45% mild pulm HTN and repeat echo 10/24/19 showed EF of 30% and severe pulm HTN.     In ED: Hemodynamically stable. Troponin 0.03, pBNP 6292. EKG with ventricular arrhythmia/block (unchanged from prior). Duplex lower extremity negative. CXR with vascular congestion but unchanged from prior (pending report)  Administered Lasix 40 mg IV once, Zofran 4 mg IV once 64 year old male with PMH of CAD, MI in 2018 s/p CABG and PCI (RCA stent with restenosis while on Plavix) on DAPT, DM II, DLD, Celiac, HFrEF  s/p Right TKA on 10/21/19 complicated by surgical site infection admitted to [facility] 11/22-11/26 s/p washout and on PICC antibiotics for ORSA (last day 1/6/2020) presents to the ED complaining dyspnea and orthopnea. Patient reports that he was dyspneic for the past couple of days so he took extra 20 mg of Lasix and last night he woke up feeling very woozy and lightheaded so he presented to the ED. Pt reports compliance with diet and meds. Echo 12/2018 showed EF of 40-45% mild pulm HTN and repeat echo 10/24/19 showed EF of 30% and severe pulm HTN.     In ED: Hemodynamically stable. Troponin 0.03, pBNP 6292. EKG with ventricular arrhythmia/block (unchanged from prior). Duplex lower extremity negative. CXR with vascular congestion but unchanged from prior (pending report)  Administered Lasix 40 mg IV once, Zofran 4 mg IV once

## 2019-12-12 NOTE — CONSULT NOTE ADULT - ASSESSMENT
Patient is a 64y old  Male who presents with a chief complaint of CHF exacerbation (12 Dec 2019 11:39)    Ischemic cardiomyopathy   CAD s/p CABG and PCI   Acute decompensated HFrEF (congestive heart failure)  HTN (hypertension)  Diabetes  HLD (hyperlipidemia)  Hx of NSVT while hospitalized in [facility]  MRSA infection on IV antibiotics     Recommendations:  Continue IV diuresis  Monitor electrolytes and Cr. Keep K>4, Mg>2.  Start Amiodarone 400 mg q12h for 1 week. Then 200 mg q12h for 1 week.   Continue metoprolol 100 mg daily.  ECHO Patient is a 64y old  Male who presents with a chief complaint of CHF exacerbation (12 Dec 2019 11:39)    Ischemic cardiomyopathy   CAD s/p CABG and PCI   Acute decompensated HFrEF (congestive heart failure)  HTN (hypertension)  Diabetes  HLD (hyperlipidemia)  Hx of NSVT while hospitalized in [facility]  MRSA infection on IV antibiotics     Recommendations:  Continue IV diuresis  Monitor electrolytes and Cr. Keep K>4, Mg>2.  Start Amiodarone 400 mg q12h for 1 week. Then 200 mg q12h for 1 week.   Continue metoprolol 100 mg daily.  ECHO pending

## 2019-12-12 NOTE — ED PROVIDER NOTE - OBJECTIVE STATEMENT
65yo M history of HTN DM DL CAD PCI CHF R knee TKR complicated by wound infection currently on Vancomycin via PICC followed at [facility] presenting with shortness of breath, JUNG, orthopnea, sx getting progressively worse over the past few days and significantly worse this evening. +b/l LE pain, swelling. No f/c/n/v/d, chest pain, abdominal pain. Per pt, takes 40mg Lasix daily but yesterday tried taking an additional 20mg Lasix with no relief. This AM, after getting out of bed, felt lightheaded, diaphoretic, nauseous, those sx now resolved.   Sx gradual onset, moderate, shortness of breath worse on exertion, improved with rest.

## 2019-12-12 NOTE — H&P ADULT - NSHPPHYSICALEXAM_GEN_ALL_CORE
ICU Vital Signs Last 24 Hrs  T(C): 36.4 (12 Dec 2019 07:51), Max: 36.4 (12 Dec 2019 07:51)  T(F): 97.5 (12 Dec 2019 07:51), Max: 97.5 (12 Dec 2019 07:51)  HR: 70 (12 Dec 2019 07:51) (70 - 70)  BP: 110/71 (12 Dec 2019 07:51) (110/71 - 116/68)  RR: 18 (12 Dec 2019 07:51) (18 - 18)  SpO2: 100% (12 Dec 2019 07:51) (100% - 100%)    --------    PHYSICAL EXAM:  GENERAL: NAD, speaks in full sentences, no signs of respiratory distress  HEAD:  Atraumatic, Normocephalic  EYES: EOMI, PERRLA, anicteric sclera  NECK: Supple, No JVD  CHEST/LUNG: Clear to auscultation bilaterally; No wheeze; No crackles; No accessory muscles used  HEART: Regular rate and rhythm; No murmurs;   ABDOMEN: Soft, Nontender, Nondistended; Bowel sounds present; No guarding  EXTREMITIES:  Right knee wound with no discharge or erythema. Bilateral lower extremity 2+ pitting edema  PSYCH: AAOx3  NEUROLOGY: non-focal  SKIN: No rashes or lesions

## 2019-12-12 NOTE — ED ADULT NURSE NOTE - CAS DISCH ACCOMP BY
NIESHA  This patient was called numerous times. She currently does not have a voicemail box that is set up.  She was not able to be contacted.    Transport/RN

## 2019-12-12 NOTE — PATIENT PROFILE ADULT - NSPROPOAPRESSUREINJURY_GEN_A_NUR
no I performed the initial face to face bedside interview with this patient regarding history of present illness, review of symptoms and past medical, social and family history.  I completed an independent physical examination.  I was the initial provider who evaluated this patient.  The history, review of symptoms and examination was documented by the scribe and edited by me.  I have signed out the follow up of any pending tests (i.e. labs, radiological studies) to the PA.  I have discussed the patient’s plan of care and disposition with the PA.

## 2019-12-12 NOTE — H&P ADULT - NSHPLABSRESULTS_GEN_ALL_CORE
11.1   6.75  )-----------( 420      ( 12 Dec 2019 07:37 )             37.7       12-12    136  |  100  |  26<H>  ----------------------------<  89  5.6<H>   |  20  |  1.2    Ca    8.9      12 Dec 2019 07:37  Mg     2.1     12-12    TPro  6.9  /  Alb  3.5  /  TBili  0.9  /  DBili  x   /  AST  55<H>  /  ALT  54<H>  /  AlkPhos  102  12-12         CARDIAC MARKERS ( 12 Dec 2019 07:37 )  x     / 0.03 ng/mL / x     / x     / x        Culture Results:   Few Methicillin resistant Staphylococcus aureus  Floor previously notified. (11-22 @ 19:16)  Culture Results:   Rare Methicillin resistant Staphylococcus aureus (11-22 @ 19:07)  Culture Results:   Rare Methicillin resistant Staphylococcus aureus (11-22 @ 19:07)  Culture Results:   Few Methicillin resistant Staphylococcus aureus (11-22 @ 19:07)  Culture Results:   No growth at 5 days. (11-22 @ 11:26)  Culture Results:   No growth at 5 days. (11-22 @ 11:26)  Culture Results:   No growth (11-20 @ 10:54)    ---------    < from: 12 Lead ECG (12.12.19 @ 08:48) >    Diagnosis Line Normal sinus rhythm  Left axis deviation  Non-specific intra-ventricular conduction block  Abnormal ECG    < end of copied text >    < from: [facility] Duplex Lower Ext Vein Scan, Bilat (12.12.19 @ 09:31) >    Impression:    No evidence of deep venous thrombosis or superficial thrombophlebitis in   bilateral lower extremities.    < end of copied text >

## 2019-12-12 NOTE — ED PROVIDER NOTE - NS ED ROS FT
Constitutional:  See HPI.   Eyes:  No visual changes, eye pain or discharge.  ENMT:  No hearing changes, pain, discharge or infections. No neck pain or stiffness.  Cardiac:  No chest pain No chest pain with exertion.  Respiratory:  No cough or respiratory distress. No hemoptysis.  GI:  No nausea, vomiting, diarrhea, abdominal pain.  :  No dysuria, frequency, hematuria  MS:  No joint pain or back pain.  Neuro:  No LOC. No headache or weakness.    Skin:  No skin rash.  Except as in HPI, all other review of systems is negative

## 2019-12-12 NOTE — CONSULT NOTE ADULT - SUBJECTIVE AND OBJECTIVE BOX
Patient is a 64y old  Male who presents with a chief complaint of CHF exacerbation (12 Dec 2019 11:39)        HPI:  64 year old male with PMH of CAD, MI in 2018 s/p CABG and PCI (RCA stent with restenosis while on Plavix) on DAPT, DM II, DLD, Celiac, HFrEF  s/p Right TKA on 10/21/19 complicated by surgical site infection admitted to [facility] 11/22-11/26 s/p washout and on PICC antibiotics for ORSA (last day 1/6/2020) presents to the ED complaining dyspnea and orthopnea. Patient reports that he was dyspneic for the past couple of days so he took extra 20 mg of Lasix and last night he woke up feeling very woozy and lightheaded so he presented to the ED. Pt reports compliance with diet and meds. Echo 12/2018 showed EF of 40-45% mild pulm HTN and repeat echo 10/24/19 showed EF of 30% and severe pulm HTN.     In ED: Hemodynamically stable. Troponin 0.03, pBNP 6292. EKG with ventricular arrhythmia/block (unchanged from prior). Duplex lower extremity negative. CXR with vascular congestion.  Administered Lasix 40 mg IV once, Zofran 4 mg IV once (12 Dec 2019 11:39)      PAST MEDICAL & SURGICAL HISTORY:  History of celiac disease  CHF (congestive heart failure)  HTN (hypertension)  Diabetes  Blood clot due to device, implant, or graft: was on blood thinners  HLD (hyperlipidemia)  Chronic systolic CHF (congestive heart failure)  Osteoarthritis  Atherosclerosis of coronary artery: CAD (coronary artery disease)  Status post percutaneous transluminal coronary angioplasty: in 2012  S/P TKR (total knee replacement), right  S/P CABG x 1: 2018  Stented coronary artery: 10/18 heart attack  INFERIOR WALL MI  Other postprocedural status: Fixation hardware in foot LEFT                  PREVIOUS DIAGNOSTIC TESTING:      ECHO  FINDINGS:  < from: TTE Echo w/Cont Complete (10.24.19 @ 13:14) >  CONCLUSIONS:     1. Moderate to severe global left ventricular systolic dysfunction. No   LV thrombus. EF 30%.   2. The right ventricle is mildly dilated with normal rightventricular   systolic function.   3. Severe pulmonary hypertension, PASP is 67.8 mmHg.   4. No pericardial effusion.    < end of copied text >      STRESS  FINDINGS:    CATHETERIZATION  FINDINGS:  < from: Cardiac Cath Lab - Adult (12.12.18 @ 10:29) >  CORONARY CIRCULATION: The coronary circulation is right dominant. Therewas    1-vessel coronary artery disease (LAD). Bypass grafts were patent. Left    main: Normal. LAD: The vessel was medium sized. Ostial LAD: There was a    discrete 95 % stenosis. There was ARIANA grade 3 flow through the vessel    (brisk flow). Proximal LAD: There was a diffuse 70 % stenosis at the site    of a prior stent. There was ARIANA grade 3 flow through the vessel (brisk    flow). Mid LAD: The vessel was small to medium sized. Angiography showed    mild atherosclerosis with no flow limitinglesions. Distal LAD: The vessel    was small to medium sized. Angiography showed mild atherosclerosis with no    flow limiting lesions. The artery was supplied by a patent bypass graft.    Circumflex: The vessel was medium sized. Proximal circumflex:Angiography    showed mild atherosclerosis with no flow limiting lesions. Distal    circumflex: The vessel was small to medium sized. Angiography showed minor    luminal irregularities with no flow limiting lesions. 1st obtuse marginal:    The vesselwas medium sized. Prior stent was patent. There was a discrete    50- 60 % stenosis at a site with no prior intervention, at the ostium of    the vessel segment, at the proximal margin of the stented segment. The    lesion was hazy. There was ARIANA grade 3 flow through the vessel (brisk    flow). RCA: The vessel was medium to large sized. Proximal RCA:    Angiography showed no evidence of disease. Mid RCA: Angiography showed no    evidence of disease. Distal RCA: The vessel was medium to large sized.    There was a discrete 30- 40 % stenosis at the site of a prior stent. There    was ARIANA grade 3 flow through the vessel (brisk flow). Graft to the LAD:    The graft was a medium sized LIMA. Graft angiography showed no evidence of    disease.        COMPLICATIONS: No complications occurred during the cath lab visit.         IMPRESSIONS: There is significant single vessel native coronary artery    disease. Patent LIMA to LAD. No significant restenosis in RCA/OM1.    < end of copied text >      ELECTROPHYSIOLOGY STUDY  FINDINGS:    CAROTID ULTRASOUND:  FINDINGS    VENOUS DUPLEX SCAN:  FINDINGS:    CHEST CT PULMONARY ANGIO with IV Contrast:  FINDINGS:    MEDICATIONS  (STANDING):  aspirin enteric coated 81 milliGRAM(s) Oral daily  chlorhexidine 4% Liquid 1 Application(s) Topical <User Schedule>  DULoxetine 90 milliGRAM(s) Oral daily  enoxaparin Injectable 40 milliGRAM(s) SubCutaneous daily  metoprolol succinate  milliGRAM(s) Oral daily  pantoprazole    Tablet 40 milliGRAM(s) Oral before breakfast  polyethylene glycol 3350 17 Gram(s) Oral daily  prasugrel 10 milliGRAM(s) Oral daily  rifAMPin 300 milliGRAM(s) Oral every 12 hours  vancomycin  IVPB 1000 milliGRAM(s) IV Intermittent every 12 hours    MEDICATIONS  (PRN):  acetaminophen   Tablet .. 650 milliGRAM(s) Oral every 6 hours PRN Mild Pain (1 - 3)  oxycodone    5 mG/acetaminophen 325 mG 1 Tablet(s) Oral every 6 hours PRN Severe Pain (7 - 10)  senna 2 Tablet(s) Oral at bedtime PRN Constipation      FAMILY HISTORY:  Family history of heart disease (Mother)  Family history of heart disease (Mother)      SOCIAL HISTORY:    CIGARETTES: negative    ALCOHOL: negative     Past Surgical History:    Allergies:    ACE inhibitors (Hives)  enalapril (Hives)      REVIEW OF SYSTEMS:    CONSTITUTIONAL: No fever, weight loss, chills, shakes, or fatigue  EYES: No eye pain, visual disturbances, or discharge  ENMT:  No difficulty hearing, tinnitus, vertigo; No sinus or throat pain  NECK: No pain or stiffness  BREASTS: No pain, masses, or nipple discharge  RESPIRATORY: No cough, wheezing, hemoptysis.  CARDIOVASCULAR: Dyspnea at rest and on exertion. See HPI.   GASTROINTESTINAL: No abdominal  or epigastric pain, nausea, vomiting, hematemesis, diarrhea, constipation, melena or bright red blood.  GENITOURINARY: No dysuria, nocturia, hematuria, or urinary incontinence  NEUROLOGICAL: No headaches, memory loss, slurred speech, limb weakness, loss of strength, numbness, or tremors  SKIN: No itching, burning, rashes, or lesions   LYMPH NODES: No enlarged glands  ENDOCRINE: No heat or cold intolerance, or hair loss  MUSCULOSKELETAL: R TKA s/p MRSA infection.  PSYCHIATRIC: No depression, anxiety, or difficulty sleeping  HEME/LYMPH: No easy bruising or bleeding gums  ALLERY AND IMMUNOLOGIC: No hives or rash.      Vital Signs Last 24 Hrs  T(C): 36.7 (12 Dec 2019 15:26), Max: 36.7 (12 Dec 2019 15:26)  T(F): 98.1 (12 Dec 2019 15:26), Max: 98.1 (12 Dec 2019 15:26)  HR: 85 (12 Dec 2019 15:26) (70 - 85)  BP: 110/58 (12 Dec 2019 15:26) (110/58 - 116/68)  BP(mean): --  RR: 18 (12 Dec 2019 15:26) (18 - 18)  SpO2: 96% (12 Dec 2019 15:26) (96% - 100%)    PHYSICAL EXAM:        GENERAL: In no apparent distress, well nourished, and hydrated.  HEAD:  Atraumatic, Normocephalic  EYES: EOMI, PERRLA, conjunctiva and sclera clear  ENMT: No tonsillar erythema, exudates, or enlargements; ist mucous membranes, Good dentition, No lesions  NECK: Supple and normal thyroid.  No JVD or carotid bruit.  Carotid pulse is 2+ bilaterally.  HEART: Regular rate and rhythm; No murmurs, rubs, or gallops.  PULMONARY: bibasilar crackles.  ABDOMEN: Soft, Nontender, Nondistended; Bowel sounds present  EXTREMITIES:  2+ Peripheral Pulses, No clubbing, cyanosis. +1 shin edema L>R.  LYMPH: No lymphadenopathy noted  NEUROLOGICAL: Grossly nonfocal      INTERPRETATION OF TELEMETRY: Sinus rhythm     ECG: Sinus rhythm with IVCD    I&O's Detail      LABS:                        11.1   6.75  )-----------( 420      ( 12 Dec 2019 07:37 )             37.7     12-12    136  |  100  |  26<H>  ----------------------------<  89  5.6<H>   |  20  |  1.2    Ca    8.9      12 Dec 2019 07:37  Mg     2.1     12-12    TPro  6.9  /  Alb  3.5  /  TBili  0.9  /  DBili  x   /  AST  55<H>  /  ALT  54<H>  /  AlkPhos  102  12-12    CARDIAC MARKERS ( 12 Dec 2019 07:37 )  x     / 0.03 ng/mL / x     / x     / x          BNPSerum Pro-Brain Natriuretic Peptide: 6292 pg/mL (12-12 @ 07:37)    I&O's Detail    Daily Height in cm: 185.42 (12 Dec 2019 06:47)    Daily     RADIOLOGY & ADDITIONAL STUDIES:

## 2019-12-12 NOTE — H&P ADULT - NSICDXPASTMEDICALHX_GEN_ALL_CORE_FT
PAST MEDICAL HISTORY:  Atherosclerosis of coronary artery CAD (coronary artery disease)    Blood clot due to device, implant, or graft was on blood thinners    CHF (congestive heart failure)     Chronic systolic CHF (congestive heart failure)     Diabetes     History of celiac disease     HLD (hyperlipidemia)     HTN (hypertension)     Osteoarthritis     Status post percutaneous transluminal coronary angioplasty in 2012

## 2019-12-12 NOTE — ED PROVIDER NOTE - PHYSICAL EXAMINATION
Constitutional: Well appearing. No acute distress. Non toxic.   Eyes: PERRLA. Extraocular movements intact, no entrapment. Conjunctiva normal.   ENT: No nasal discharge. Moist mucus membranes.  Neck: Supple, non tender, full range of motion.  CV: RRR no murmurs, rubs, or gallops. +S1S2.   Pulm: diminished at b/l bases. Normal work of breathing.  Abd: soft NT ND +BS.   Ext: Warm and well perfused x4, moving all extremities, 2+ pitting edema b/l LE. +R knee erythema and wound, no drainage, improved, per pt  Psy: Cooperative, appropriate.   Skin: Warm, dry, no rash  Neuro: CN2-12 grossly intact no sensory or motor deficits throughout, no drift

## 2019-12-12 NOTE — ED ADULT NURSE NOTE - NSIMPLEMENTINTERV_GEN_ALL_ED
Implemented All Fall with Harm Risk Interventions:  [place] to call system. Call bell, personal items and telephone within reach. Instruct patient to call for assistance. Room bathroom lighting operational. Non-slip footwear when patient is off stretcher. Physically safe environment: no spills, clutter or unnecessary equipment. Stretcher in lowest position, wheels locked, appropriate side rails in place. Provide visual cue, wrist band, yellow gown, etc. Monitor gait and stability. Monitor for mental status changes and reorient to person, place, and time. Review medications for side effects contributing to fall risk. Reinforce activity limits and safety measures with patient and family. Provide visual clues: red socks.

## 2019-12-12 NOTE — H&P ADULT - ASSESSMENT
65 year old male with PMH of CAD, MI in 2018 s/p PCI  (RCA stent with restenosis while on Plavix) on DAPT, DM II, DLD, Celiac, HFrEF  s/p R TKA on 10/21/19 complicated by surgical site infection admitted to [facility] 11/22-11/26 s/p washout and on PICC antibiotics for ORSA (last day 1/6/2020) presents to the ED complaining dyspnea and orthopnea.    # HFrEF exacerbation  - 2D-echo 10/24/19 showed EF of 30%, RV/LV mildly dilated and severe pulm HTN.  - pBNP 6292, Troponin 0.03 likely demand  - Lungs clear on exam; CXR with vascular congestion but unchanged from prior (pending report)  - Likely combined RHF/LHF - s/p Lasix 40 mg IV in ED; would avoid over-diuresing  - Lasix 40 mg PO daily  - Continue Toprol  - Was not able to tolerate Entresto in past  - Cardiology eval (Dr Lopez)    # Ventricular arrhythmia/block  - Seen on previous EKG but described asymptomatic  - On Metoprolol succinate 100 mg daily  - EPS consult for symptomatic arrhythmia with worsening EF and pulm-HTN    # Surgical wound infection s/p washout  - Right TKA on 10/21/19 complicated by surgical site infection admitted to [facility] 11/22-11/26  - Wound culture with ORSA  - Continue Vancomycin and Rifampin via PICC (last day 1/6/20)    # CAD and MI s/p PCI (RCA) in 2018  - Plavix failure in past  - Continue ASA/Effient, Toprol  - Was not able to tolerate ACE and ARB as well as Entresto in past  - On Lipitor but currently on hold while on Rifampin due to elevated LFTs    # DM II with retinopathy - controlled  - HbA1C 10/22/19 7.5  - On Jardiance and Trulicity at home  - Check fingersticks  - Start insulin regimen if >180 consistently  - Carb consistent diet    # DLD  - On Crestor 40 mg at home  - On hold while on Rifampin per ID for elevated LFTs    # Hx of Celiac disease - Gluten/gliadin free diet    GI prophylaxis: Protonix  DVT prophylaxis: Lovenox    Dispo: from home 65 year old male with PMH of CAD, MI in 2018 s/p PCI  (RCA stent with restenosis while on Plavix) on DAPT, DM II, DLD, Celiac, HFrEF  s/p R TKA on 10/21/19 complicated by surgical site infection admitted to [facility] 11/22-11/26 s/p washout and on PICC antibiotics for ORSA (last day 1/6/2020) presents to the ED complaining dyspnea and orthopnea.    # HFrEF exacerbation  - 2D-echo 10/24/19 showed EF of 30%, RV/LV mildly dilated and severe pulm HTN.  - pBNP 6292, Troponin 0.03 likely demand  - Lungs clear on exam; CXR with vascular congestion but unchanged from prior (pending report)  - Likely combined RHF/LHF - s/p Lasix 40 mg IV in ED; would avoid over-diuresing  - Lasix 40 mg PO daily  - Continue Toprol  - Was not able to tolerate Entresto in past  - Cardiology eval (Dr Lopez)    # Intraventricular block / ventricular arrhythmia  - Seen on previous EKG; ventricular arrhythmia mentioned in previous cardiology note (11/26/19) and described as asymptomatic  - Cardiology recommended EPS eval outpatient ASAP  - On Metoprolol succinate 100 mg daily  - EPS consult    # Surgical wound infection s/p washout  - Right TKA on 10/21/19 complicated by surgical site infection admitted to [facility] 11/22-11/26  - Wound culture with ORSA  - Continue Vancomycin and Rifampin via PICC (last day 1/6/20)    # CAD and MI s/p PCI (RCA) in 2018  - Plavix failure in past  - Continue ASA/Effient, Toprol  - Was not able to tolerate ACE and ARB as well as Entresto in past  - On Lipitor but currently on hold while on Rifampin due to elevated LFTs    # DM II with retinopathy - controlled  - HbA1C 10/22/19 7.5  - On Jardiance and Trulicity at home  - Check fingersticks  - Start insulin regimen if >180 consistently  - Carb consistent diet    # DLD  - On Crestor 40 mg at home  - On hold while on Rifampin per ID for elevated LFTs    # Hx of Celiac disease - Gluten/gliadin free diet    GI prophylaxis: Protonix  DVT prophylaxis: Lovenox    Dispo: from home 64 year old male with PMH of CAD, MI in 2018 s/p PCI  (RCA stent with restenosis while on Plavix) on DAPT, DM II, DLD, Celiac, HFrEF  s/p R TKA on 10/21/19 complicated by surgical site infection admitted to [facility] 11/22-11/26 s/p washout and on PICC antibiotics for ORSA (last day 1/6/2020) presents to the ED complaining dyspnea and orthopnea.    # HFrEF exacerbation  - 2D-echo 10/24/19 showed EF of 30%, RV/LV mildly dilated and severe pulm HTN.  - pBNP 6292, Troponin 0.03 likely demand  - Lungs clear on exam; CXR with vascular congestion but unchanged from prior (pending report)  - Likely combined RHF/LHF - s/p Lasix 40 mg IV in ED; would avoid over-diuresing  - Lasix 40 mg PO daily  - Continue Toprol  - Was not able to tolerate Entresto in past  - Cardiology eval (Dr Lopez)    # Intraventricular block / ventricular arrhythmia  - Seen on previous EKG; ventricular arrhythmia mentioned in previous cardiology note (11/26/19) and described as asymptomatic  - Cardiology recommended EPS eval outpatient ASAP  - On Metoprolol succinate 100 mg daily  - EPS consult    # Surgical wound infection s/p washout  - Right TKA on 10/21/19 complicated by surgical site infection admitted to [facility] 11/22-11/26  - Wound culture with ORSA  - Continue Vancomycin and Rifampin via PICC (last day 1/6/20)    # CAD and MI s/p PCI (RCA) in 2018  - Plavix failure in past  - Continue ASA/Effient, Toprol  - Was not able to tolerate ACE and ARB as well as Entresto in past  - On Lipitor but currently on hold while on Rifampin due to elevated LFTs    # DM II with retinopathy - controlled  - HbA1C 10/22/19 7.5  - On Jardiance and Trulicity at home  - Check fingersticks  - Start insulin regimen if >180 consistently  - Carb consistent diet    # DLD  - On Crestor 40 mg at home  - On hold while on Rifampin per ID for elevated LFTs    # Hx of Celiac disease - Gluten/gliadin free diet    GI prophylaxis: Protonix  DVT prophylaxis: Lovenox    Dispo: from home 64 year old male with PMH of CAD, MI in 2018 s/p CABG and PCI  (RCA stent with restenosis while on Plavix) on DAPT, DM II, DLD, Celiac, HFrEF  s/p R TKA on 10/21/19 complicated by surgical site infection admitted to [facility] 11/22-11/26 s/p washout and on PICC antibiotics for ORSA (last day 1/6/2020) presents to the ED complaining dyspnea and orthopnea.    # HFrEF exacerbation  - 2D-echo 10/24/19 showed EF of 30%, RV/LV mildly dilated and severe pulm HTN.  - pBNP 6292, Troponin 0.03 likely demand  - Lungs clear on exam; CXR with vascular congestion but unchanged from prior (pending report)  - Likely combined RHF/LHF - s/p Lasix 40 mg IV in ED; would avoid over-diuresing  - Lasix 40 mg PO daily  - Continue Toprol  - Was not able to tolerate Entresto in past  - Cardiology eval (Dr Lopez)    # Intraventricular block / ventricular arrhythmia  - Seen on previous EKG; ventricular arrhythmia mentioned in previous cardiology note (11/26/19) and described as asymptomatic  - Cardiology recommended EPS eval outpatient ASAP  - On Metoprolol succinate 100 mg daily  - EPS consult    # Surgical wound infection s/p washout  - Right TKA on 10/21/19 complicated by surgical site infection admitted to [facility] 11/22-11/26  - Wound culture with ORSA  - Continue Vancomycin and Rifampin via PICC (last day 1/6/20)    # CAD and MI s/p CABG and PCI (RCA) in 2018  - Plavix failure in past  - Continue ASA/Effient, Toprol  - Was not able to tolerate ACE and ARB as well as Entresto in past  - On Lipitor but currently on hold while on Rifampin due to elevated LFTs    # DM II with retinopathy - controlled  - HbA1C 10/22/19 7.5  - On Jardiance and Trulicity at home  - Check fingersticks  - Start insulin regimen if >180 consistently  - Carb consistent diet    # DLD  - On Crestor 40 mg at home  - On hold while on Rifampin per ID for elevated LFTs    # Hx of Celiac disease - Gluten/gliadin free diet    GI prophylaxis: Protonix  DVT prophylaxis: Lovenox    Dispo: from home

## 2019-12-12 NOTE — ED ADULT TRIAGE NOTE - CHIEF COMPLAINT QUOTE
Patient BIBEMS from home states he woke up and had an episode of vomiting and diaphoresis. Patient denies chest pain.

## 2019-12-12 NOTE — ED PROVIDER NOTE - CLINICAL SUMMARY MEDICAL DECISION MAKING FREE TEXT BOX
65yo M history of HTN DM DL CAD PCI CHF R knee TKR complicated by wound infection currently on Vancomycin via PICC followed at [facility] presenting with shortness of breath, JUNG, orthopnea, sx getting progressively worse over the past few days and significantly worse this evening. +b/l LE pain, swelling. No f/c/n/v/d, chest pain, abdominal pain. Per pt, takes 40mg Lasix daily but yesterday tried taking an additional 20mg Lasix with no relief. This AM, after getting out of bed, felt lightheaded, diaphoretic, nauseous, those sx now resolved. labs ekg imaging reviewed. will admit

## 2019-12-12 NOTE — ED ADULT NURSE REASSESSMENT NOTE - NS ED NURSE REASSESS COMMENT FT1
received pt from day shift RN , pt denies any pain , no labored breathing as of this time , denies any pain , no SOB

## 2019-12-13 LAB
ALBUMIN SERPL ELPH-MCNC: 3.4 G/DL — LOW (ref 3.5–5.2)
ALP SERPL-CCNC: 105 U/L — SIGNIFICANT CHANGE UP (ref 30–115)
ALT FLD-CCNC: 60 U/L — HIGH (ref 0–41)
ANION GAP SERPL CALC-SCNC: 16 MMOL/L — HIGH (ref 7–14)
APPEARANCE UR: CLEAR — SIGNIFICANT CHANGE UP
AST SERPL-CCNC: 50 U/L — HIGH (ref 0–41)
BASOPHILS # BLD AUTO: 0.1 K/UL — SIGNIFICANT CHANGE UP (ref 0–0.2)
BASOPHILS NFR BLD AUTO: 1.2 % — HIGH (ref 0–1)
BILIRUB SERPL-MCNC: 1.4 MG/DL — HIGH (ref 0.2–1.2)
BILIRUB UR-MCNC: NEGATIVE — SIGNIFICANT CHANGE UP
BUN SERPL-MCNC: 26 MG/DL — HIGH (ref 10–20)
CALCIUM SERPL-MCNC: 8.9 MG/DL — SIGNIFICANT CHANGE UP (ref 8.5–10.1)
CHLORIDE SERPL-SCNC: 98 MMOL/L — SIGNIFICANT CHANGE UP (ref 98–110)
CO2 SERPL-SCNC: 25 MMOL/L — SIGNIFICANT CHANGE UP (ref 17–32)
COLOR SPEC: YELLOW — SIGNIFICANT CHANGE UP
CREAT SERPL-MCNC: 1.3 MG/DL — SIGNIFICANT CHANGE UP (ref 0.7–1.5)
DIFF PNL FLD: NEGATIVE — SIGNIFICANT CHANGE UP
EOSINOPHIL # BLD AUTO: 0.3 K/UL — SIGNIFICANT CHANGE UP (ref 0–0.7)
EOSINOPHIL NFR BLD AUTO: 3.6 % — SIGNIFICANT CHANGE UP (ref 0–8)
GLUCOSE BLDC GLUCOMTR-MCNC: 164 MG/DL — HIGH (ref 70–99)
GLUCOSE BLDC GLUCOMTR-MCNC: 212 MG/DL — HIGH (ref 70–99)
GLUCOSE BLDC GLUCOMTR-MCNC: 259 MG/DL — HIGH (ref 70–99)
GLUCOSE BLDC GLUCOMTR-MCNC: 279 MG/DL — HIGH (ref 70–99)
GLUCOSE SERPL-MCNC: 202 MG/DL — HIGH (ref 70–99)
GLUCOSE UR QL: ABNORMAL
HCT VFR BLD CALC: 36.7 % — LOW (ref 42–52)
HGB BLD-MCNC: 11.1 G/DL — LOW (ref 14–18)
IMM GRANULOCYTES NFR BLD AUTO: 0.4 % — HIGH (ref 0.1–0.3)
KETONES UR-MCNC: NEGATIVE — SIGNIFICANT CHANGE UP
LEUKOCYTE ESTERASE UR-ACNC: NEGATIVE — SIGNIFICANT CHANGE UP
LYMPHOCYTES # BLD AUTO: 1.19 K/UL — LOW (ref 1.2–3.4)
LYMPHOCYTES # BLD AUTO: 14.2 % — LOW (ref 20.5–51.1)
MAGNESIUM SERPL-MCNC: 2 MG/DL — SIGNIFICANT CHANGE UP (ref 1.8–2.4)
MCHC RBC-ENTMCNC: 24.3 PG — LOW (ref 27–31)
MCHC RBC-ENTMCNC: 30.2 G/DL — LOW (ref 32–37)
MCV RBC AUTO: 80.3 FL — SIGNIFICANT CHANGE UP (ref 80–94)
MONOCYTES # BLD AUTO: 1.03 K/UL — HIGH (ref 0.1–0.6)
MONOCYTES NFR BLD AUTO: 12.3 % — HIGH (ref 1.7–9.3)
NEUTROPHILS # BLD AUTO: 5.71 K/UL — SIGNIFICANT CHANGE UP (ref 1.4–6.5)
NEUTROPHILS NFR BLD AUTO: 68.3 % — SIGNIFICANT CHANGE UP (ref 42.2–75.2)
NITRITE UR-MCNC: NEGATIVE — SIGNIFICANT CHANGE UP
NRBC # BLD: 0 /100 WBCS — SIGNIFICANT CHANGE UP (ref 0–0)
PH UR: 5 — SIGNIFICANT CHANGE UP (ref 5–8)
PLATELET # BLD AUTO: 395 K/UL — SIGNIFICANT CHANGE UP (ref 130–400)
POTASSIUM SERPL-MCNC: 5.2 MMOL/L — HIGH (ref 3.5–5)
POTASSIUM SERPL-SCNC: 5.2 MMOL/L — HIGH (ref 3.5–5)
PROT SERPL-MCNC: 6.8 G/DL — SIGNIFICANT CHANGE UP (ref 6–8)
PROT UR-MCNC: SIGNIFICANT CHANGE UP
RBC # BLD: 4.57 M/UL — LOW (ref 4.7–6.1)
RBC # FLD: 18.3 % — HIGH (ref 11.5–14.5)
SODIUM SERPL-SCNC: 139 MMOL/L — SIGNIFICANT CHANGE UP (ref 135–146)
SP GR SPEC: 1.01 — SIGNIFICANT CHANGE UP (ref 1.01–1.02)
TROPONIN T SERPL-MCNC: 0.04 NG/ML — CRITICAL HIGH
TROPONIN T SERPL-MCNC: 0.04 NG/ML — CRITICAL HIGH
UROBILINOGEN FLD QL: SIGNIFICANT CHANGE UP
WBC # BLD: 8.36 K/UL — SIGNIFICANT CHANGE UP (ref 4.8–10.8)
WBC # FLD AUTO: 8.36 K/UL — SIGNIFICANT CHANGE UP (ref 4.8–10.8)

## 2019-12-13 PROCEDURE — 99233 SBSQ HOSP IP/OBS HIGH 50: CPT

## 2019-12-13 PROCEDURE — 93306 TTE W/DOPPLER COMPLETE: CPT | Mod: 26

## 2019-12-13 PROCEDURE — 99232 SBSQ HOSP IP/OBS MODERATE 35: CPT

## 2019-12-13 RX ORDER — GLUCAGON INJECTION, SOLUTION 0.5 MG/.1ML
1 INJECTION, SOLUTION SUBCUTANEOUS ONCE
Refills: 0 | Status: DISCONTINUED | OUTPATIENT
Start: 2019-12-13 | End: 2019-12-16

## 2019-12-13 RX ORDER — SODIUM CHLORIDE 9 MG/ML
1000 INJECTION, SOLUTION INTRAVENOUS
Refills: 0 | Status: DISCONTINUED | OUTPATIENT
Start: 2019-12-13 | End: 2019-12-16

## 2019-12-13 RX ORDER — AMIODARONE HYDROCHLORIDE 400 MG/1
400 TABLET ORAL EVERY 12 HOURS
Refills: 0 | Status: DISCONTINUED | OUTPATIENT
Start: 2019-12-13 | End: 2019-12-14

## 2019-12-13 RX ORDER — DEXTROSE 50 % IN WATER 50 %
25 SYRINGE (ML) INTRAVENOUS ONCE
Refills: 0 | Status: DISCONTINUED | OUTPATIENT
Start: 2019-12-13 | End: 2019-12-16

## 2019-12-13 RX ORDER — DEXTROSE 50 % IN WATER 50 %
15 SYRINGE (ML) INTRAVENOUS ONCE
Refills: 0 | Status: DISCONTINUED | OUTPATIENT
Start: 2019-12-13 | End: 2019-12-16

## 2019-12-13 RX ORDER — DEXTROSE 50 % IN WATER 50 %
12.5 SYRINGE (ML) INTRAVENOUS ONCE
Refills: 0 | Status: DISCONTINUED | OUTPATIENT
Start: 2019-12-13 | End: 2019-12-16

## 2019-12-13 RX ORDER — INSULIN LISPRO 100/ML
VIAL (ML) SUBCUTANEOUS
Refills: 0 | Status: DISCONTINUED | OUTPATIENT
Start: 2019-12-13 | End: 2019-12-16

## 2019-12-13 RX ORDER — INSULIN GLARGINE 100 [IU]/ML
18 INJECTION, SOLUTION SUBCUTANEOUS AT BEDTIME
Refills: 0 | Status: DISCONTINUED | OUTPATIENT
Start: 2019-12-13 | End: 2019-12-16

## 2019-12-13 RX ADMIN — PANTOPRAZOLE SODIUM 40 MILLIGRAM(S): 20 TABLET, DELAYED RELEASE ORAL at 05:41

## 2019-12-13 RX ADMIN — Medication 100 MILLIGRAM(S): at 05:41

## 2019-12-13 RX ADMIN — Medication 100 UNIT(S): at 21:05

## 2019-12-13 RX ADMIN — Medication 250 MILLIGRAM(S): at 05:41

## 2019-12-13 RX ADMIN — Medication 6: at 22:29

## 2019-12-13 RX ADMIN — PRASUGREL 10 MILLIGRAM(S): 5 TABLET, FILM COATED ORAL at 12:02

## 2019-12-13 RX ADMIN — Medication 40 MILLIGRAM(S): at 05:41

## 2019-12-13 RX ADMIN — ENOXAPARIN SODIUM 40 MILLIGRAM(S): 100 INJECTION SUBCUTANEOUS at 12:02

## 2019-12-13 RX ADMIN — Medication 81 MILLIGRAM(S): at 12:02

## 2019-12-13 RX ADMIN — OXYCODONE AND ACETAMINOPHEN 1 TABLET(S): 5; 325 TABLET ORAL at 12:00

## 2019-12-13 RX ADMIN — POLYETHYLENE GLYCOL 3350 17 GRAM(S): 17 POWDER, FOR SOLUTION ORAL at 12:04

## 2019-12-13 RX ADMIN — Medication 250 MILLIGRAM(S): at 17:50

## 2019-12-13 RX ADMIN — INSULIN GLARGINE 18 UNIT(S): 100 INJECTION, SOLUTION SUBCUTANEOUS at 22:47

## 2019-12-13 RX ADMIN — DULOXETINE HYDROCHLORIDE 90 MILLIGRAM(S): 30 CAPSULE, DELAYED RELEASE ORAL at 12:01

## 2019-12-13 RX ADMIN — OXYCODONE AND ACETAMINOPHEN 1 TABLET(S): 5; 325 TABLET ORAL at 12:40

## 2019-12-13 NOTE — PROGRESS NOTE ADULT - SUBJECTIVE AND OBJECTIVE BOX
FLOWER MARISCAL  64y Male    CHIEF COMPLAINT:    Patient is a 64y old  Male who presents with a chief complaint of CHF exacerbation (13 Dec 2019 10:09)      INTERVAL HPI/OVERNIGHT EVENTS:    Patient seen and examined. No sob. No cp. No palpitations. NSVT    ROS: All other systems are negative.    Vital Signs:    T(F): 96.9 (19 @ 05:21), Max: 98.1 (19 @ 15:26)  HR: 102 (19 @ 08:49) (80 - 102)  BP: 134/86 (19 @ 08:49) (110/58 - 143/83)  RR: 19 (19 @ 05:21) (18 - 19)  SpO2: 98% (19 @ 08:49) (96% - 98%)  I&O's Summary    Daily Height in cm: 185.42 (12 Dec 2019 21:10)    Daily Weight in k.6 (13 Dec 2019 05:21)  CAPILLARY BLOOD GLUCOSE      POCT Blood Glucose.: 212 mg/dL (13 Dec 2019 11:34)  POCT Blood Glucose.: 164 mg/dL (13 Dec 2019 07:36)  POCT Blood Glucose.: 212 mg/dL (12 Dec 2019 22:04)  POCT Blood Glucose.: 138 mg/dL (12 Dec 2019 17:47)      PHYSICAL EXAM:    GENERAL:  NAD  SKIN: No rashes or lesions  HENT: Atraumatic Normocephalic. PERRL. Moist membranes.  NECK: Supple, No JVD. No lymphadenopathy.  PULMONARY: CTA B/L. No wheezing. No rales  CVS: Normal S1, S2. Rate and Rythm are regular. No murmurs.  ABDOMEN/GI: Soft, Nontender, Nondistended; BS present  EXTREMITIES: Peripheral pulses intact. No edema B/L LE.  NEUROLOGIC:  No motor or sensory deficit.  PSYCH: Alert & oriented x 3    Consultant(s) Notes Reviewed:  [x ] YES  [ ] NO  Care Discussed with Consultants/Other Providers [ x] YES  [ ] NO    EKG reviewed  Telemetry reviewed    LABS:                        11.1   8.36  )-----------( 395      ( 13 Dec 2019 04:30 )             36.7         139  |  98  |  26<H>  ----------------------------<  202<H>  5.2<H>   |  25  |  1.3    Ca    8.9      13 Dec 2019 04:30  Mg     2.0         TPro  6.8  /  Alb  3.4<L>  /  TBili  1.4<H>  /  DBili  x   /  AST  50<H>  /  ALT  60<H>  /  AlkPhos  105        Serum Pro-Brain Natriuretic Peptide: 6292 pg/mL (19 @ 07:37)    Trop 0.04, CKMB --, CK --, 19 @ 04:30  Trop 0.04, CKMB --, CK --, 19 @ 01:13  Trop 0.04, CKMB --, CK --, 19 @ 16:33  Trop 0.03, CKMB --, CK --, 19 @ 07:37        RADIOLOGY & ADDITIONAL TESTS:    < from: TTE Echo w/Cont Complete (10.24.19 @ 13:14) >  -----  CONCLUSIONS:     1. Moderate to severe global left ventricular systolic dysfunction. No   LV thrombus. EF 30%.   2. The right ventricle is mildly dilated with normal rightventricular   systolic function.   3. Severe pulmonary hypertension, PASP is 67.8 mmHg.   4. No pericardial effusion.    < end of copied text >    Imaging or report Personally Reviewed:  [ ] YES  [ ] NO    Medications:  Standing  aMIOdarone    Tablet 400 milliGRAM(s) Oral every 12 hours  aspirin enteric coated 81 milliGRAM(s) Oral daily  chlorhexidine 4% Liquid 1 Application(s) Topical <User Schedule>  DULoxetine 90 milliGRAM(s) Oral daily  enoxaparin Injectable 40 milliGRAM(s) SubCutaneous daily  furosemide    Tablet 40 milliGRAM(s) Oral daily  heparin  flush 100 Units/mL Injectable 1 Unit(s) IV Push once  metoprolol succinate  milliGRAM(s) Oral daily  pantoprazole    Tablet 40 milliGRAM(s) Oral before breakfast  polyethylene glycol 3350 17 Gram(s) Oral daily  prasugrel 10 milliGRAM(s) Oral daily  rifAMPin 300 milliGRAM(s) Oral every 12 hours  vancomycin  IVPB 1000 milliGRAM(s) IV Intermittent every 12 hours    PRN Meds  acetaminophen   Tablet .. 650 milliGRAM(s) Oral every 6 hours PRN  oxycodone    5 mG/acetaminophen 325 mG 1 Tablet(s) Oral every 6 hours PRN  senna 2 Tablet(s) Oral at bedtime PRN      Case discussed with resident    Care discussed with pt/family

## 2019-12-13 NOTE — PROGRESS NOTE ADULT - ASSESSMENT
Patient is a 64y old  Male who presents with a chief complaint of CHF exacerbation (13 Dec 2019 12:31)    Impression:  Ischemic cardiomyopathy   CAD s/p CABG and PCI   Acute decompensated HFrEF (congestive heart failure)-improved today  HTN (hypertension)  Diabetes  Hx of NSVT while hospitalized in [facility]  MRSA infection on IV antibiotics     Recommendations:   Continue diuresis with PO Lasix  ID evaluation  Blood cx x2   If cleared by ID patient can be candidate for Bi-V AICD  Digoxin 0.5 mg now, then 0.125 mg daily.   Monitor electrolytes and Cr. Patient is a 64y old  Male who presents with a chief complaint of CHF exacerbation (13 Dec 2019 12:31)    Impression:  Ischemic cardiomyopathy=> Patient ALLERGIC to ACEi/ARB and Entresto   CAD s/p CABG and PCI   Acute decompensated HFrEF (congestive heart failure)-improved today  HTN (hypertension)  Diabetes  Hx of NSVT while hospitalized in [facility]  MRSA infection on IV antibiotics     Recommendations:   Continue diuresis with PO Lasix  ID evaluation  Blood cx x2   If cleared by ID patient can be candidate for Bi-V AICD  Digoxin 0.5 mg now, then 0.125 mg daily.   Monitor electrolytes and Cr.

## 2019-12-13 NOTE — PROGRESS NOTE ADULT - SUBJECTIVE AND OBJECTIVE BOX
Patient is a 64y old  Male who presents with a chief complaint of CHF exacerbation (13 Dec 2019 12:31)      Subjective:  Patient seen and examined at bedside.        Review Of Systems: No chest pain, shortness of breath, or palpitations            Medications:  acetaminophen   Tablet .. 650 milliGRAM(s) Oral every 6 hours PRN  aMIOdarone    Tablet 400 milliGRAM(s) Oral every 12 hours  aspirin enteric coated 81 milliGRAM(s) Oral daily  chlorhexidine 4% Liquid 1 Application(s) Topical <User Schedule>  DULoxetine 90 milliGRAM(s) Oral daily  enoxaparin Injectable 40 milliGRAM(s) SubCutaneous daily  furosemide    Tablet 40 milliGRAM(s) Oral daily  heparin  flush 100 Units/mL Injectable 1 Unit(s) IV Push once  metoprolol succinate  milliGRAM(s) Oral daily  oxycodone    5 mG/acetaminophen 325 mG 1 Tablet(s) Oral every 6 hours PRN  pantoprazole    Tablet 40 milliGRAM(s) Oral before breakfast  polyethylene glycol 3350 17 Gram(s) Oral daily  prasugrel 10 milliGRAM(s) Oral daily  rifAMPin 300 milliGRAM(s) Oral every 12 hours  senna 2 Tablet(s) Oral at bedtime PRN  vancomycin  IVPB 1000 milliGRAM(s) IV Intermittent every 12 hours      PAST MEDICAL & SURGICAL HISTORY:  History of celiac disease  CHF (congestive heart failure)  HTN (hypertension)  Diabetes  Blood clot due to device, implant, or graft: was on blood thinners  HLD (hyperlipidemia)  Chronic systolic CHF (congestive heart failure)  Osteoarthritis  Atherosclerosis of coronary artery: CAD (coronary artery disease)  Status post percutaneous transluminal coronary angioplasty: in 2012  S/P TKR (total knee replacement), right  S/P CABG x 1: 2018  Stented coronary artery: 10/18 heart attack  INFERIOR WALL MI  Other postprocedural status: Fixation hardware in foot LEFT      Objective:  Vitals:  T(F): 97 (12-13), Max: 98.1 (12-12)  HR: 83 (12-13) (80 - 102)  BP: 122/73 (12-13) (112/69 - 143/83)  RR: 18 (12-13)  SpO2: 98% (12-13)  I&O's Summary      Physical Exam:  Appearance: No acute distress; well appearing  Eyes: PERRL, EOMI, pink conjunctiva  HENT: Normal oral muscosa  Cardiovascular: RRR, S1, S2, no murmurs, rubs, or gallops; no edema; no JVD  Respiratory: bibasilar crackles  Gastrointestinal: soft, non-tender, non-distended with normal bowel sounds  Musculoskeletal: No clubbing; no joint deformity   Neurologic: Non-focal  Lymphatic: No lymphadenopathy  Psychiatry: AAOx3, mood & affect appropriate  Skin: No rashes, ecchymoses, or cyanosis                          11.1   8.36  )-----------( 395      ( 13 Dec 2019 04:30 )             36.7     12-13    139  |  98  |  26<H>  ----------------------------<  202<H>  5.2<H>   |  25  |  1.3    Ca    8.9      13 Dec 2019 04:30  Mg     2.0     12-13    TPro  6.8  /  Alb  3.4<L>  /  TBili  1.4<H>  /  DBili  x   /  AST  50<H>  /  ALT  60<H>  /  AlkPhos  105  12-13      CARDIAC MARKERS ( 13 Dec 2019 04:30 )  x     / 0.04 ng/mL / x     / x     / x      CARDIAC MARKERS ( 13 Dec 2019 01:13 )  x     / 0.04 ng/mL / x     / x     / x      CARDIAC MARKERS ( 12 Dec 2019 16:33 )  x     / 0.04 ng/mL / x     / x     / x      CARDIAC MARKERS ( 12 Dec 2019 07:37 )  x     / 0.03 ng/mL / x     / x     / x          Serum Pro-Brain Natriuretic Peptide: 6292 pg/mL (12-12 @ 07:37)          New ECG(s): Personally reviewed    Echo:  < from: Transthoracic Echocardiogram (12.13.19 @ 10:55) >  Summary:   1. Severely decreased global left ventricular systolic function.   2. Multiple left ventricular regional wall motion abnormalities exist.   See wall motion findings.   3. Elevated left atrial and left ventricular end-diastolic pressures.   4. Spectral Doppler shows restrictive pattern of left ventricular   myocardial filling (Grade III diastolic dysfunction).   5. The LVEF by triplane Wilkerson's technique is 11%. The mean global   longitudinal peak strain by speckle tracking is -5.6% which is markedly   reduced.   6. Mild mitral valve regurgitation.   7. Mild-moderate tricuspid regurgitation.   8. Estimated pulmonary artery systolic pressure is 78.6 mmHg assuming a   right atrial pressure of 15 mmHg, which is consistent with severe   pulmonary hypertension.   9. Pulmonary hypertension is present.  10. LA volume Index is 56.9 ml/m² ml/m2.    < end of copied text >      Stress Testing:     Cath:    Imaging:    Interpretation of Telemetry: no events

## 2019-12-13 NOTE — PROGRESS NOTE ADULT - ASSESSMENT
3C 14A Juanito Blas  64 year old male with PMH of CAD, MI in 2018 s/p CABG and PCI  (RCA stent with restenosis while on Plavix) on DAPT, DM II, DLD, Celiac, HFrEF  s/p R TKA on 10/21/19 complicated by surgical site infection admitted to [facility] 11/22-11/26 s/p washout and on PICC antibiotics for ORSA (last day 1/6/2020) presents to the ED complaining dyspnea and orthopnea.    # HFrEF exacerbation  - 2D-echo 10/24/19 showed EF of 30%, RV/LV mildly dilated and severe pulm HTN.  - pBNP 6292, Troponin 0.03 likely demand  - Lungs clear on exam; CXR with vascular congestion but unchanged from prior (pending report)  - Likely combined RHF/LHF - s/p Lasix 40 mg IV in ED; would avoid over-diuresing  - Lasix 40 mg PO daily, adding metolazone 2.5mg 30mins before Lasix   - Continue Toprol  - Was not able to tolerate Entresto in past  - Cardiology eval (Dr Lopez)    # Intraventricular block / ventricular arrhythmia  - Seen on previous EKG; ventricular arrhythmia mentioned in previous cardiology note (11/26/19) and described as asymptomatic  - On Metoprolol succinate 100 mg daily  - EPS: Amiodarone 400mg bid x7 d then 200mg daily x7d, but patient refusing. Dr. Lopez okay with no amio, but will f/u with EP. Will need LifeVest for now until infection clears.     # Surgical wound infection s/p washout  - Right TKA on 10/21/19 complicated by surgical site infection admitted to [facility] 11/22-11/26  - Wound culture with ORSA  - Continue Vancomycin and Rifampin via PICC (last day 1/6/20)    # CAD and MI s/p CABG and PCI (RCA) in 2018  - Plavix failure in past  - Continue ASA/Effient, Toprol  - Was not able to tolerate ACE and ARB as well as Entresto in past  - On Lipitor but currently on hold while on Rifampin due to elevated LFTs    # DM II with retinopathy - controlled  - HbA1C 10/22/19 7.5  - On Jardiance and Trulicity at home  - Check fingersticks  - Start insulin regimen if >180 consistently  - Carb consistent diet    # DLD  - On Crestor 40 mg at home  - On hold while on Rifampin per ID for elevated LFTs    # Hx of Celiac disease - Gluten/gliadin free diet    GI ppx: Protonix DVT ppx: Lovenox Dispo: from home [address] 14A Juanito Blas  64 year old male with PMH of CAD, MI in 2018 s/p CABG and PCI  (RCA stent with restenosis while on Plavix) on DAPT, DM II, DLD, Celiac, HFrEF  s/p R TKA on 10/21/19 complicated by surgical site infection admitted to [facility] 11/22-11/26 s/p washout and on PICC antibiotics for ORSA (last day 1/6/2020) presents to the ED complaining dyspnea and orthopnea.    # HFrEF exacerbation  - 2D-echo 10/24/19 showed EF of 30%, RV/LV mildly dilated and severe pulm HTN.  - pBNP 6292, Troponin 0.03 likely demand  - Lungs clear on exam; CXR with vascular congestion but unchanged from prior (pending report)  - Likely combined RHF/LHF - s/p Lasix 40 mg IV in ED; would avoid over-diuresing  - Lasix 40 mg PO daily, adding metolazone 2.5mg 30mins before Lasix   - Continue Toprol  - Was not able to tolerate Entresto in past  - Cardiology eval (Dr Lopez)    # Intraventricular block / ventricular arrhythmia  - Seen on previous EKG; ventricular arrhythmia mentioned in previous cardiology note (11/26/19) and described as asymptomatic  - On Metoprolol succinate 100 mg daily  - EPS: Amiodarone 400mg bid x7 d then 200mg daily x7d, but patient refusing. Dr. Lopez okay with no amio, but will f/u with EP. Will need LifeVest for now until infection clears, [employer] rep on board.     # Surgical wound infection s/p washout  - Right TKA on 10/21/19 complicated by surgical site infection admitted to [facility] 11/22-11/26  - Wound culture with ORSA  - Continue Vancomycin and Rifampin via PICC (last day 1/6/20)    # CAD and MI s/p CABG and PCI (RCA) in 2018  - Plavix failure in past  - Continue ASA/Effient, Toprol  - Was not able to tolerate ACE and ARB as well as Entresto in past  - On Lipitor but currently on hold while on Rifampin due to elevated LFTs    # DM II with retinopathy - controlled  - HbA1C 10/22/19 7.5  - On Jardiance and Trulicity at home  - Check fingersticks  - Start insulin regimen if >180 consistently  - Carb consistent diet    # DLD  - On Crestor 40 mg at home  - On hold while on Rifampin per ID for elevated LFTs    # Hx of Celiac disease - Gluten/gliadin free diet    GI ppx: Protonix DVT ppx: Lovenox Dispo: from home

## 2019-12-13 NOTE — PROGRESS NOTE ADULT - ASSESSMENT
64 year old male with PMH of CAD, MI in 2018 s/p CABG and PCI (RCA stent with restenosis while on Plavix) on DAPT, DM II, DLD, Celiac, HFrEF  s/p Right TKA on 10/21/19 complicated by surgical site infection admitted to [facility] 11/22-11/26 s/p washout and on PICC antibiotics for ORSA (last day 1/6/2020) presents to the ED complaining dyspnea and orthopnea for the last 2 days.      1.	SOB likely due to Sub acute HFrEF  2.	CAD S/P CABG resistant to Plavix  3.	ICM with EF 30%  4.	NSVT  5.	Recent Rt. TKA at [facility] and wound infection on Abx   6.	DM-2 / DL  7.	Celiac disease         PLAN:    ·	S/P IV Lasix in the ER. No sob. Switched to PO Lasix  ·	Cont Lasix 40 mg po daily.  ·	Check i's and o's and daily wt  ·	Low salt diet and water restriction to 1.5 L/D  ·	EP eval noted and care D/W the cardiology  ·	EP recommended to start him on Amiodarone but pt is refusing to take  ·	Pt is intolerant to ACE-I/ARB. On BB  ·	Cardiology recommended life vest for now. Will need AICD once completes his Abx course for Rt knee infection  ·	Cont his Abx for Rt knee infection.   ·	Monitor FS. Start him on Lantus/Humalog

## 2019-12-13 NOTE — CONSULT NOTE ADULT - SUBJECTIVE AND OBJECTIVE BOX
HPI:  64 year old male with PMH of CAD, MI in 2018 s/p CABG and PCI (RCA stent with restenosis while on Plavix) on DAPT, DM II, DLD, Celiac, HFrEF  s/p Right TKA on 10/21/19 complicated by surgical site infection admitted to [facility] 11/22-11/26 s/p washout and on PICC antibiotics for ORSA (last day 1/6/2020) presents to the ED complaining dyspnea and orthopnea. Patient reports that he was dyspneic for the past couple of days so he took extra 20 mg of Lasix and last night he woke up feeling very woozy and lightheaded so he presented to the ED. Pt reports compliance with diet and meds. Echo 12/2018 showed EF of 40-45% mild pulm HTN and repeat echo 10/24/19 showed EF of 30% and severe pulm HTN.     In ED: Hemodynamically stable. Troponin 0.03, pBNP 6292. EKG with ventricular arrhythmia/block (unchanged from prior). Duplex lower extremity negative. CXR with vascular congestion but unchanged from prior (pending report)  Administered Lasix 40 mg IV once, Zofran 4 mg IV once (12 Dec 2019 11:39)      PAST MEDICAL & SURGICAL HISTORY  History of celiac disease  CHF (congestive heart failure)  HTN (hypertension)  Diabetes  Blood clot due to device, implant, or graft: was on blood thinners  HLD (hyperlipidemia)  Chronic systolic CHF (congestive heart failure)  Osteoarthritis  Atherosclerosis of coronary artery: CAD (coronary artery disease)  Status post percutaneous transluminal coronary angioplasty: in 2012  S/P TKR (total knee replacement), right  S/P CABG x 1: 2018  Stented coronary artery: 10/18 heart attack  INFERIOR WALL MI  Other postprocedural status: Fixation hardware in foot LEFT      FAMILY HISTORY:  FAMILY HISTORY:  Family history of heart disease (Mother)  Family history of heart disease (Mother)      SOCIAL HISTORY:  []smoker  []Alcohol  []Drug    ALLERGIES:  ACE inhibitors (Hives)  enalapril (Hives)      MEDICATIONS:  MEDICATIONS  (STANDING):  aMIOdarone    Tablet 400 milliGRAM(s) Oral every 12 hours  aspirin enteric coated 81 milliGRAM(s) Oral daily  chlorhexidine 4% Liquid 1 Application(s) Topical <User Schedule>  DULoxetine 90 milliGRAM(s) Oral daily  enoxaparin Injectable 40 milliGRAM(s) SubCutaneous daily  furosemide    Tablet 40 milliGRAM(s) Oral daily  heparin  flush 100 Units/mL Injectable 1 Unit(s) IV Push once  metoprolol succinate  milliGRAM(s) Oral daily  pantoprazole    Tablet 40 milliGRAM(s) Oral before breakfast  polyethylene glycol 3350 17 Gram(s) Oral daily  prasugrel 10 milliGRAM(s) Oral daily  rifAMPin 300 milliGRAM(s) Oral every 12 hours  vancomycin  IVPB 1000 milliGRAM(s) IV Intermittent every 12 hours    MEDICATIONS  (PRN):  acetaminophen   Tablet .. 650 milliGRAM(s) Oral every 6 hours PRN Mild Pain (1 - 3)  oxycodone    5 mG/acetaminophen 325 mG 1 Tablet(s) Oral every 6 hours PRN Severe Pain (7 - 10)  senna 2 Tablet(s) Oral at bedtime PRN Constipation      HOME MEDICATIONS:  Home Medications:  acetaminophen 325 mg oral tablet: 2 tab(s) orally every 6 hours (22 Oct 2019 14:02)  Crestor 40 mg oral tablet: 1 tab(s) orally once a day (21 Oct 2019 07:33)  Cymbalta: 90 milligram(s) orally once a day (21 Oct 2019 07:33)  furosemide 40 mg oral tablet: 1 tab(s) orally once a day (12 Dec 2019 11:37)  Jardiance 10 mg oral tablet: 1 tab(s) orally once a day (in the morning) (21 Oct 2019 07:33)  Metoprolol Succinate  mg oral tablet, extended release: 1 tab(s) orally once a day (21 Oct 2019 07:33)  oxycodone-acetaminophen 5 mg-325 mg oral tablet: 1-2 tab(s) orally every 4-6 hours, as needed for moderate to severe pain (26 Nov 2019 14:58)  polyethylene glycol 3350 oral powder for reconstitution: 17 gram(s) orally once a day (22 Oct 2019 14:02)  prasugrel 10 mg oral tablet: 1 tab(s) orally once a day (21 Oct 2019 07:33)  senna oral tablet: 2 tab(s) orally once a day (at bedtime), As needed, Constipation (22 Oct 2019 14:02)  Trulicity Pen 1.5 mg/0.5 mL subcutaneous solution:  (21 Oct 2019 07:33)      VITALS:   T(F): 96.9 (12-13 @ 05:21), Max: 98.1 (12-12 @ 15:26)  HR: 102 (12-13 @ 08:49) (70 - 102)  BP: 134/86 (12-13 @ 08:49) (110/58 - 143/83)  BP(mean): --  RR: 19 (12-13 @ 05:21) (18 - 19)  SpO2: 98% (12-13 @ 08:49) (96% - 100%)    I&O's Summary      REVIEW OF SYSTEMS:  CONSTITUTIONAL: No weakness, fevers or chills  EYES: No visual changes  ENT: No vertigo or throat pain   NECK: No pain or stiffness  RESPIRATORY: see HPI  CARDIOVASCULAR: No chest pain or palpitations  GASTROINTESTINAL: No abdominal or epigastric pain. No nausea, vomiting, or hematemesis; No diarrhea or constipation. No melena or hematochezia.  GENITOURINARY: No dysuria, frequency or hematuria  NEUROLOGICAL: No numbness or weakness  SKIN: No itching, no rashes      PHYSICAL EXAM:  NEURO: patient is awake , alert and oriented  GEN: Not in acute distress  NECK: no thyroid enlargement  LUNGS: basal crackles  CARDIOVASCULAR: S1/S2 present, RRR , no murmurs  ABD: Soft, non-tender, non-distended, +BS  EXT: No FERNANDO  SKIN: clean right knee surgical scar     LABS:                        11.1   8.36  )-----------( 395      ( 13 Dec 2019 04:30 )             36.7     12-13    139  |  98  |  26<H>  ----------------------------<  202<H>  5.2<H>   |  25  |  1.3    Ca    8.9      13 Dec 2019 04:30  Mg     2.0     12-13    TPro  6.8  /  Alb  3.4<L>  /  TBili  1.4<H>  /  DBili  x   /  AST  50<H>  /  ALT  60<H>  /  AlkPhos  105  12-13      Troponin T, Serum: 0.04 ng/mL <HH> (12-13-19 @ 04:30)  Troponin T, Serum: 0.04 ng/mL <HH> (12-13-19 @ 01:13)  Troponin T, Serum: 0.04 ng/mL <HH> (12-12-19 @ 16:33)    CARDIAC MARKERS ( 13 Dec 2019 04:30 )  x     / 0.04 ng/mL / x     / x     / x      CARDIAC MARKERS ( 13 Dec 2019 01:13 )  x     / 0.04 ng/mL / x     / x     / x      CARDIAC MARKERS ( 12 Dec 2019 16:33 )  x     / 0.04 ng/mL / x     / x     / x      CARDIAC MARKERS ( 12 Dec 2019 07:37 )  x     / 0.03 ng/mL / x     / x     / x            Serum Pro-Brain Natriuretic Peptide: 6292 pg/mL (12-12-19 @ 07:37)    11-25 Chol 68 LDL 35 HDL 23<L> Trig 52      RADIOLOGY:  -CXR: < from: Xray Chest 1 View-PORTABLE IMMEDIATE (12.12.19 @ 07:45) >  Impression:      No radiographic evidence of acute cardiopulmonary disease.    < end of copied text >    < from: TTE Echo w/Cont Complete (10.24.19 @ 13:14) >     1. Moderate to severe global left ventricular systolic dysfunction. No   LV thrombus. EF 30%.   2. The right ventricle is mildly dilated with normal rightventricular   systolic function.   3. Severe pulmonary hypertension, PASP is 67.8 mmHg.   4. No pericardial effusion.    < end of copied text >      ECG:  < from: 12 Lead ECG (12.12.19 @ 08:48) >  Diagnosis Line Normal sinus rhythm  Left axis deviation  Non-specific intra-ventricular conduction block  Abnormal ECG    < end of copied text >

## 2019-12-13 NOTE — PROGRESS NOTE ADULT - SUBJECTIVE AND OBJECTIVE BOX
FLOWER MARISCAL 64y Male      Patient is a 64y old  Male who presents with a chief complaint of CHF exacerbation (12 Dec 2019 18:02)        INTERVAL HPI/OVERNIGHT EVENTS: No acute events overnight. Patient was seen and evaluated at the bedside. The patient denies pain. Vitals stable. Patient denies fever/chills, chest pain, abdominal pain, headaches, nausea/vomiting, and diarrhea/constipation. +knee discomfort right. mild SOB    PHYSICAL EXAM:  GENERAL: NAD,  well-developed  HEAD:  Atraumatic, Normocephalic  EYES: EOMI, PERRLA, conjunctiva and sclera clear  NERVOUS SYSTEM:  Alert & Oriented, CN II-XII intact bilaterally  CHEST/LUNG: bibasilar faint crackles  HEART: Regular rate and rhythm; No murmurs, rubs, or gallops  ABDOMEN: Soft, Nontender, Nondistended; Bowel sounds present  EXTREMITIES:  2+ Peripheral Pulses, No clubbing, cyanosis, or edema. +scab of R knee, postsurgical         Vital Signs Last 24 Hrs  T(C): 36.1 (13 Dec 2019 05:21), Max: 36.7 (12 Dec 2019 15:26)  T(F): 96.9 (13 Dec 2019 05:21), Max: 98.1 (12 Dec 2019 15:26)  HR: 89 (13 Dec 2019 05:21) (80 - 89)  BP: 143/83 (13 Dec 2019 05:21) (110/58 - 143/83)  BP(mean): --  RR: 19 (13 Dec 2019 05:21) (18 - 19)  SpO2: 97% (12 Dec 2019 19:30) (96% - 97%)      Consultant(s) Notes Reviewed:  [X] YES  [ ] NO  Care Discussed with Consultants/Other Providers [X] YES  [ ] NO  Imaging Personally Reviewed:  [X] YES  [ ] NO      LABS:                        11.1   8.36  )-----------( 395      ( 13 Dec 2019 04:30 )             36.7     12-13    139  |  98  |  26<H>  ----------------------------<  202<H>  5.2<H>   |  25  |  1.3    Ca    8.9      13 Dec 2019 04:30  Mg     2.0     12-13    TPro  6.8  /  Alb  3.4<L>  /  TBili  1.4<H>  /  DBili  x   /  AST  50<H>  /  ALT  60<H>  /  AlkPhos  105  12-13          CAPILLARY BLOOD GLUCOSE      POCT Blood Glucose.: 164 mg/dL (13 Dec 2019 07:36)  POCT Blood Glucose.: 212 mg/dL (12 Dec 2019 22:04)  POCT Blood Glucose.: 138 mg/dL (12 Dec 2019 17:47)

## 2019-12-13 NOTE — CONSULT NOTE ADULT - ASSESSMENT
CAD s/p CABG , PCI with plavix resistance , on Effient/asa  ICM EF 30%: on BB , intolerant to ACEI/ARB or Entresto , in Sinus rhythm , QRS wide ( 140msec) with IVCD , NSVT ( refused Amio)  SOB likely subacute CHF exacerbation: currently euvolemic  right knee surgical site infection : on IV AB through picc line    recommendation:   po lasix , add metolazone as outpt ( 30 min prior to lasix dose) , daily weight and water restriction  c/w DAPT and BB   life vest for now , until resolution of infection , then will repeat ECho as outpt , if EF remains Low  , pt needs BiV-ICD insertion CAD s/p CABG , PCI with plavix resistance , on Effient/asa  ICM EF 30%: on BB , intolerant to ACEI/ARB or Entresto , in Sinus rhythm , QRS wide ( 140msec) with IVCD , NSVT ( refused Amio)  SOB likely subacute CHF exacerbation: currently euvolemic  right knee surgical site infection : on IV AB through picc line    recommendation:   po lasix , add metolazone as outpt ( 30 min prior to lasix dose) , daily weight and water restriction  c/w DAPT and BB   life vest for now , until resolution of infection , then will repeat ECho as outpt , if EF remains Low  , pt needs BiV-ICD insertion  ID F/U

## 2019-12-14 LAB
ALBUMIN SERPL ELPH-MCNC: 3.6 G/DL — SIGNIFICANT CHANGE UP (ref 3.5–5.2)
ALP SERPL-CCNC: 97 U/L — SIGNIFICANT CHANGE UP (ref 30–115)
ALT FLD-CCNC: 63 U/L — HIGH (ref 0–41)
ANION GAP SERPL CALC-SCNC: 16 MMOL/L — HIGH (ref 7–14)
AST SERPL-CCNC: 51 U/L — HIGH (ref 0–41)
BASOPHILS # BLD AUTO: 0.09 K/UL — SIGNIFICANT CHANGE UP (ref 0–0.2)
BASOPHILS NFR BLD AUTO: 1.1 % — HIGH (ref 0–1)
BILIRUB SERPL-MCNC: 1.4 MG/DL — HIGH (ref 0.2–1.2)
BUN SERPL-MCNC: 28 MG/DL — HIGH (ref 10–20)
CALCIUM SERPL-MCNC: 8.7 MG/DL — SIGNIFICANT CHANGE UP (ref 8.5–10.1)
CHLORIDE SERPL-SCNC: 97 MMOL/L — LOW (ref 98–110)
CO2 SERPL-SCNC: 24 MMOL/L — SIGNIFICANT CHANGE UP (ref 17–32)
CREAT SERPL-MCNC: 1.3 MG/DL — SIGNIFICANT CHANGE UP (ref 0.7–1.5)
EOSINOPHIL # BLD AUTO: 0.35 K/UL — SIGNIFICANT CHANGE UP (ref 0–0.7)
EOSINOPHIL NFR BLD AUTO: 4.4 % — SIGNIFICANT CHANGE UP (ref 0–8)
GLUCOSE BLDC GLUCOMTR-MCNC: 166 MG/DL — HIGH (ref 70–99)
GLUCOSE BLDC GLUCOMTR-MCNC: 182 MG/DL — HIGH (ref 70–99)
GLUCOSE BLDC GLUCOMTR-MCNC: 86 MG/DL — SIGNIFICANT CHANGE UP (ref 70–99)
GLUCOSE BLDC GLUCOMTR-MCNC: 87 MG/DL — SIGNIFICANT CHANGE UP (ref 70–99)
GLUCOSE SERPL-MCNC: 99 MG/DL — SIGNIFICANT CHANGE UP (ref 70–99)
HCT VFR BLD CALC: 36.5 % — LOW (ref 42–52)
HGB BLD-MCNC: 11.1 G/DL — LOW (ref 14–18)
IMM GRANULOCYTES NFR BLD AUTO: 0.4 % — HIGH (ref 0.1–0.3)
LYMPHOCYTES # BLD AUTO: 1.21 K/UL — SIGNIFICANT CHANGE UP (ref 1.2–3.4)
LYMPHOCYTES # BLD AUTO: 15.2 % — LOW (ref 20.5–51.1)
MAGNESIUM SERPL-MCNC: 2 MG/DL — SIGNIFICANT CHANGE UP (ref 1.8–2.4)
MCHC RBC-ENTMCNC: 24.2 PG — LOW (ref 27–31)
MCHC RBC-ENTMCNC: 30.4 G/DL — LOW (ref 32–37)
MCV RBC AUTO: 79.5 FL — LOW (ref 80–94)
MONOCYTES # BLD AUTO: 1.49 K/UL — HIGH (ref 0.1–0.6)
MONOCYTES NFR BLD AUTO: 18.7 % — HIGH (ref 1.7–9.3)
NEUTROPHILS # BLD AUTO: 4.81 K/UL — SIGNIFICANT CHANGE UP (ref 1.4–6.5)
NEUTROPHILS NFR BLD AUTO: 60.2 % — SIGNIFICANT CHANGE UP (ref 42.2–75.2)
NRBC # BLD: 0 /100 WBCS — SIGNIFICANT CHANGE UP (ref 0–0)
PLATELET # BLD AUTO: 377 K/UL — SIGNIFICANT CHANGE UP (ref 130–400)
POTASSIUM SERPL-MCNC: 4.7 MMOL/L — SIGNIFICANT CHANGE UP (ref 3.5–5)
POTASSIUM SERPL-SCNC: 4.7 MMOL/L — SIGNIFICANT CHANGE UP (ref 3.5–5)
PROT SERPL-MCNC: 6.8 G/DL — SIGNIFICANT CHANGE UP (ref 6–8)
RBC # BLD: 4.59 M/UL — LOW (ref 4.7–6.1)
RBC # FLD: 18.4 % — HIGH (ref 11.5–14.5)
SODIUM SERPL-SCNC: 137 MMOL/L — SIGNIFICANT CHANGE UP (ref 135–146)
WBC # BLD: 7.98 K/UL — SIGNIFICANT CHANGE UP (ref 4.8–10.8)
WBC # FLD AUTO: 7.98 K/UL — SIGNIFICANT CHANGE UP (ref 4.8–10.8)

## 2019-12-14 PROCEDURE — 99233 SBSQ HOSP IP/OBS HIGH 50: CPT

## 2019-12-14 RX ORDER — DIGOXIN 250 MCG
0.12 TABLET ORAL DAILY
Refills: 0 | Status: DISCONTINUED | OUTPATIENT
Start: 2019-12-15 | End: 2019-12-16

## 2019-12-14 RX ORDER — DIGOXIN 250 MCG
0.5 TABLET ORAL DAILY
Refills: 0 | Status: COMPLETED | OUTPATIENT
Start: 2019-12-14 | End: 2019-12-14

## 2019-12-14 RX ADMIN — Medication 250 MILLIGRAM(S): at 17:18

## 2019-12-14 RX ADMIN — Medication 100 MILLIGRAM(S): at 05:44

## 2019-12-14 RX ADMIN — CHLORHEXIDINE GLUCONATE 1 APPLICATION(S): 213 SOLUTION TOPICAL at 05:44

## 2019-12-14 RX ADMIN — Medication 0.5 MILLIGRAM(S): at 11:57

## 2019-12-14 RX ADMIN — Medication 2: at 11:57

## 2019-12-14 RX ADMIN — PANTOPRAZOLE SODIUM 40 MILLIGRAM(S): 20 TABLET, DELAYED RELEASE ORAL at 05:42

## 2019-12-14 RX ADMIN — PRASUGREL 10 MILLIGRAM(S): 5 TABLET, FILM COATED ORAL at 11:56

## 2019-12-14 RX ADMIN — Medication 81 MILLIGRAM(S): at 11:56

## 2019-12-14 RX ADMIN — POLYETHYLENE GLYCOL 3350 17 GRAM(S): 17 POWDER, FOR SOLUTION ORAL at 11:56

## 2019-12-14 RX ADMIN — Medication 40 MILLIGRAM(S): at 05:41

## 2019-12-14 RX ADMIN — INSULIN GLARGINE 18 UNIT(S): 100 INJECTION, SOLUTION SUBCUTANEOUS at 21:57

## 2019-12-14 RX ADMIN — Medication 250 MILLIGRAM(S): at 05:45

## 2019-12-14 RX ADMIN — OXYCODONE AND ACETAMINOPHEN 1 TABLET(S): 5; 325 TABLET ORAL at 07:32

## 2019-12-14 RX ADMIN — OXYCODONE AND ACETAMINOPHEN 1 TABLET(S): 5; 325 TABLET ORAL at 06:29

## 2019-12-14 RX ADMIN — ENOXAPARIN SODIUM 40 MILLIGRAM(S): 100 INJECTION SUBCUTANEOUS at 11:57

## 2019-12-14 RX ADMIN — DULOXETINE HYDROCHLORIDE 90 MILLIGRAM(S): 30 CAPSULE, DELAYED RELEASE ORAL at 11:56

## 2019-12-14 NOTE — CONSULT NOTE ADULT - ASSESSMENT
# ID clearance for AICD placement    would recommend:    1. Follow up blood culture , IF NEGATIVE THEN Clear from ID stand point for AICD placement    will follow the patient with you and make further recommendation based on the clinical course and Lab results  Thank you for the opportunity to participate in Mr. MARISCAL's care # ID clearance for AICD placement    would recommend:    1. Follow up blood culture , IF NEGATIVE THEN Clear from ID stand point for AICD placement    will follow the patient with you and make further recommendation based on the clinical course and Lab results  Thank you for the opportunity to participate in Mr. MARISCAL's care      - spent more than 55 minutes on this total encounter

## 2019-12-14 NOTE — PROGRESS NOTE ADULT - SUBJECTIVE AND OBJECTIVE BOX
FLOWER MARISCAL 64y Male      Patient is a 64y old  Male who presents with a chief complaint of CHF exacerbation (13 Dec 2019 16:51)        INTERVAL HPI/OVERNIGHT EVENTS: No acute events overnight. Patient was seen and evaluated at the bedside. The patient denies pain. Vitals stable. Patient denies fever/chills, chest pain, shortness of breath, abdominal pain, headaches, nausea/vomiting, and diarrhea/constipation.    PHYSICAL EXAM:  GENERAL: NAD,  well-developed  HEAD:  Atraumatic, Normocephalic  EYES: EOMI, PERRLA, conjunctiva and sclera clear  NERVOUS SYSTEM:  Alert & Oriented, CN II-XII intact bilaterally  CHEST/LUNG: Clear to auscultation bilaterally; No rales, rhonchi, wheezing, or rubs  HEART: Regular rate and rhythm; No murmurs, rubs, or gallops  ABDOMEN: Soft, Nontender, Nondistended; Bowel sounds present  EXTREMITIES:  2+ Peripheral Pulses, No clubbing, cyanosis, or edema        Vital Signs Last 24 Hrs  T(C): 36.3 (14 Dec 2019 05:35), Max: 36.3 (14 Dec 2019 05:35)  T(F): 97.4 (14 Dec 2019 05:35), Max: 97.4 (14 Dec 2019 05:35)  HR: 83 (14 Dec 2019 05:35) (80 - 102)  BP: 127/69 (14 Dec 2019 05:35) (122/73 - 134/86)  BP(mean): --  RR: 19 (14 Dec 2019 05:35) (18 - 19)  SpO2: 98% (13 Dec 2019 08:49) (98% - 98%)      Consultant(s) Notes Reviewed:  [X] YES  [ ] NO  Care Discussed with Consultants/Other Providers [X] YES  [ ] NO  Imaging Personally Reviewed:  [X] YES  [ ] NO      LABS:                        11.1   8.36  )-----------( 395      ( 13 Dec 2019 04:30 )             36.7     12    139  |  98  |  26<H>  ----------------------------<  202<H>  5.2<H>   |  25  |  1.3    Ca    8.9      13 Dec 2019 04:30  Mg     2.0     12-13    TPro  6.8  /  Alb  3.4<L>  /  TBili  1.4<H>  /  DBili  x   /  AST  50<H>  /  ALT  60<H>  /  AlkPhos  105        Urinalysis Basic - ( 13 Dec 2019 11:46 )    Color: Yellow / Appearance: Clear / S.014 / pH: x  Gluc: x / Ketone: Negative  / Bili: Negative / Urobili: <2 mg/dL   Blood: x / Protein: Trace / Nitrite: Negative   Leuk Esterase: Negative / RBC: x / WBC x   Sq Epi: x / Non Sq Epi: x / Bacteria: x        CAPILLARY BLOOD GLUCOSE      POCT Blood Glucose.: 259 mg/dL (13 Dec 2019 22:08)  POCT Blood Glucose.: 279 mg/dL (13 Dec 2019 17:00)  POCT Blood Glucose.: 212 mg/dL (13 Dec 2019 11:34)

## 2019-12-14 NOTE — PROGRESS NOTE ADULT - ASSESSMENT
3C 14A Juanito Blas  64 year old male with PMH of CAD, MI in 2018 s/p CABG and PCI  (RCA stent with restenosis while on Plavix) on DAPT, DM II, DLD, Celiac, HFrEF  s/p R TKA on 10/21/19 complicated by surgical site infection admitted to [facility] 11/22-11/26 s/p washout and on PICC antibiotics for ORSA (last day 1/6/2020) presents to the ED complaining dyspnea and orthopnea.    # HFrEF exacerbation  - 2D-echo 10/24/19 showed EF of 30%, RV/LV mildly dilated and severe pulm HTN.  - pBNP 6292, Troponin 0.03 likely demand  - Lungs clear on exam; CXR with vascular congestion but unchanged from prior (pending report)  - Likely combined RHF/LHF - s/p Lasix 40 mg IV in ED; would avoid over-diuresing  - Lasix 40 mg PO daily, adding metolazone 2.5mg 30mins before Lasix   - Continue Toprol  - Was not able to tolerate Entresto in past  - Cardiology eval (Dr Lopez)    # Intraventricular block / ventricular arrhythmia  - Seen on previous EKG; ventricular arrhythmia mentioned in previous cardiology note (11/26/19) and described as asymptomatic  - On Metoprolol succinate 100 mg daily  - EPS: Digoxin 0.5mg today, then 0.125 daily. Will need LifeVest for now until infection clears, [employer] rep on board.   - f/u blood cultures x2 sets. If negative can receive the AICD.     # Surgical wound infection s/p washout  - Right TKA on 10/21/19 complicated by surgical site infection admitted to [facility] 11/22-11/26  - Wound culture with ORSA  - Continue Vancomycin and Rifampin via PICC (last day 1/6/20)    # CAD and MI s/p CABG and PCI (RCA) in 2018  - Plavix failure in past  - Continue ASA/Effient, Toprol  - Was not able to tolerate ACE and ARB as well as Entresto in past  - On Lipitor but currently on hold while on Rifampin due to elevated LFTs    # DM II with retinopathy - controlled  - HbA1C 10/22/19 7.5  - On Jardiance and Trulicity at home  - Check fingersticks  - Start insulin regimen if >180 consistently  - Carb consistent diet    # DLD  - On Crestor 40 mg at home  - On hold while on Rifampin per ID for elevated LFTs    # Hx of Celiac disease - Gluten/gliadin free diet 3C 14A Juanito Blas  64 year old male with PMH of CAD, MI in 2018 s/p CABG and PCI  (RCA stent with restenosis while on Plavix) on DAPT, DM II, DLD, Celiac, HFrEF  s/p R TKA on 10/21/19 complicated by surgical site infection admitted to [facility] 11/22-11/26 s/p washout and on PICC antibiotics for ORSA (last day 1/6/2020) presents to the ED complaining dyspnea and orthopnea.    # HFrEF exacerbation  - 2D-echo 10/24/19 showed EF of 30%, RV/LV mildly dilated and severe pulm HTN.  - pBNP 6292, Troponin 0.03 likely demand  - Lungs clear on exam; CXR with vascular congestion but unchanged from prior (pending report)  - Likely combined RHF/LHF - s/p Lasix 40 mg IV in ED; would avoid over-diuresing  - Lasix 40 mg PO daily, adding metolazone 2.5mg 30mins before Lasix   - Continue Toprol  - Was not able to tolerate Entresto in past  - Cardiology eval (Dr Lopez)    # Intraventricular block / ventricular arrhythmia  - Seen on previous EKG; ventricular arrhythmia mentioned in previous cardiology note (11/26/19) and described as asymptomatic  - On Metoprolol succinate 100 mg daily  - EPS: Digoxin 0.5mg today, then 0.125 daily. Will need LifeVest for now until infection clears, [employer] rep on board.   - f/u blood cultures x2 sets and ID consult if patient can receive the AICD.     # Surgical wound infection s/p washout  - Right TKA on 10/21/19 complicated by surgical site infection admitted to [facility] 11/22-11/26  - Wound culture with ORSA  - Continue Vancomycin and Rifampin via PICC (last day 1/6/20)    # CAD and MI s/p CABG and PCI (RCA) in 2018  - Plavix failure in past  - Continue ASA/Effient, Toprol  - Was not able to tolerate ACE and ARB as well as Entresto in past  - On Lipitor but currently on hold while on Rifampin due to elevated LFTs    # DM II with retinopathy - controlled  - HbA1C 10/22/19 7.5  - On Jardiance and Trulicity at home  - Check fingersticks  - Start insulin regimen if >180 consistently  - Carb consistent diet    # DLD  - On Crestor 40 mg at home  - On hold while on Rifampin per ID for elevated LFTs    # Hx of Celiac disease - Gluten/gliadin free diet

## 2019-12-14 NOTE — CONSULT NOTE ADULT - SUBJECTIVE AND OBJECTIVE BOX
Patient is a 64y old  Male who presents with a chief complaint of CHF exacerbation (14 Dec 2019 07:37)      REVIEW OF SYSTEMS: Total of twelve systems have been reviewed with patient and found to be negative unless mentioned in HPI      PAST MEDICAL & SURGICAL HISTORY:  History of celiac disease  CHF (congestive heart failure)  HTN (hypertension)  Diabetes  Blood clot due to device, implant, or graft: was on blood thinners  HLD (hyperlipidemia)  Chronic systolic CHF (congestive heart failure)  Osteoarthritis  Atherosclerosis of coronary artery: CAD (coronary artery disease)  Status post percutaneous transluminal coronary angioplasty: in   S/P TKR (total knee replacement), right  S/P CABG x 1: 2018  Stented coronary artery: 10/18 heart attack  INFERIOR WALL MI  Other postprocedural status: Fixation hardware in foot LEFT        SOCIAL HISTORY  Alcohol: Does not drink  Tobacco: Does not smoke  Illicit substance use: None      FAMILY HISTORY: Non contributory to the present illness        ALLERGIES: ACE inhibitors (Hives)  enalapril (Hives)      Vital Signs Last 24 Hrs  T(C): 36.1 (14 Dec 2019 20:58), Max: 36.3 (14 Dec 2019 05:35)  T(F): 96.9 (14 Dec 2019 20:58), Max: 97.4 (14 Dec 2019 05:35)  HR: 87 (14 Dec 2019 20:58) (75 - 87)  BP: 122/67 (14 Dec 2019 20:58) (112/65 - 127/69)  BP(mean): --  RR: 18 (14 Dec 2019 20:58) (18 - 19)  SpO2: --        PHYSICAL EXAM:  GENERAL: Not in distress   CHEST/LUNG:  Aire ntry bilaterally  HEART: s1 and s2 present  ABDOMEN:  Nontender and  Nondistended  EXTREMITIES: No pedal  edema  CNS: Awake and Alert      LABS:                        11.1   7.98  )-----------( 377      ( 14 Dec 2019 05:27 )             36.5     12-14    137  |  97<L>  |  28<H>  ----------------------------<  99  4.7   |  24  |  1.3    Ca    8.7      14 Dec 2019 05:27  Mg     2.0     12-14    TPro  6.8  /  Alb  3.6  /  TBili  1.4<H>  /  DBili  x   /  AST  51<H>  /  ALT  63<H>  /  AlkPhos  97  12-14      CAPILLARY BLOOD GLUCOSE  POCT Blood Glucose.: 166 mg/dL (14 Dec 2019 21:34)  POCT Blood Glucose.: 86 mg/dL (14 Dec 2019 16:37)  POCT Blood Glucose.: 182 mg/dL (14 Dec 2019 11:47)  POCT Blood Glucose.: 87 mg/dL (14 Dec 2019 07:58)        Urinalysis Basic - ( 13 Dec 2019 11:46 )  Color: Yellow / Appearance: Clear / S.014 / pH: x  Gluc: x / Ketone: Negative  / Bili: Negative / Urobili: <2 mg/dL   Blood: x / Protein: Trace / Nitrite: Negative   Leuk Esterase: Negative / RBC: x / WBC x   Sq Epi: x / Non Sq Epi: x / Bacteria: x        MEDICATIONS  (STANDING):  aspirin enteric coated 81 milliGRAM(s) Oral daily  chlorhexidine 4% Liquid 1 Application(s) Topical <User Schedule>  dextrose 5%. 1000 milliLiter(s) (50 mL/Hr) IV Continuous <Continuous>  dextrose 50% Injectable 12.5 Gram(s) IV Push once  dextrose 50% Injectable 25 Gram(s) IV Push once  dextrose 50% Injectable 25 Gram(s) IV Push once  DULoxetine 90 milliGRAM(s) Oral daily  enoxaparin Injectable 40 milliGRAM(s) SubCutaneous daily  furosemide    Tablet 40 milliGRAM(s) Oral daily  insulin glargine Injectable (LANTUS) 18 Unit(s) SubCutaneous at bedtime  insulin lispro (HumaLOG) corrective regimen sliding scale   SubCutaneous three times a day before meals  metoprolol succinate  milliGRAM(s) Oral daily  pantoprazole    Tablet 40 milliGRAM(s) Oral before breakfast  polyethylene glycol 3350 17 Gram(s) Oral daily  prasugrel 10 milliGRAM(s) Oral daily  rifAMPin 300 milliGRAM(s) Oral every 12 hours  vancomycin  IVPB 1000 milliGRAM(s) IV Intermittent every 12 hours    MEDICATIONS  (PRN):  acetaminophen   Tablet .. 650 milliGRAM(s) Oral every 6 hours PRN Mild Pain (1 - 3)  dextrose 40% Gel 15 Gram(s) Oral once PRN Blood Glucose LESS THAN 70 milliGRAM(s)/deciliter  glucagon  Injectable 1 milliGRAM(s) IntraMuscular once PRN Glucose LESS THAN 70 milligrams/deciliter  oxycodone    5 mG/acetaminophen 325 mG 1 Tablet(s) Oral every 6 hours PRN Severe Pain (7 - 10)  senna 2 Tablet(s) Oral at bedtime PRN Constipation          RADIOLOGY & ADDITIONAL TESTS: Patient is a 64y old  Male  with PMH of CAD, MI in 2018 s/p CABG and PCI (RCA stent with restenosis while on Plavix) on DAPT, DM II, DLD, Celiac, HFrEF  s/p Right TKA on 10/21/19 complicated by surgical site infection admitted to [facility] - s/p washout and on PICC antibiotics for ORSA (last day 2020) presents to the ED complaining dyspnea and orthopnea. Patient reports that he was dyspneic for the past couple of days so he took extra 20 mg of Lasix and last night he woke up feeling very woozy and lightheaded so he presented to the ED. Pt reports compliance with diet and meds. Echo 2018 showed EF of 40-45% mild pulm HTN and repeat echo 10/24/19 showed EF of 30% and severe pulm HTN.   In ED: Hemodynamically stable. Troponin 0.03, pBNP 6292. EKG with ventricular arrhythmia/block (unchanged from prior). Duplex lower extremity negative. CXR with vascular congestion but unchanged from prior (pending report).        REVIEW OF SYSTEMS: Total of twelve systems have been reviewed with patient and found to be negative unless mentioned in HPI      PAST MEDICAL & SURGICAL HISTORY:  History of celiac disease  CHF (congestive heart failure)  HTN (hypertension)  Diabetes  Blood clot due to device, implant, or graft: was on blood thinners  HLD (hyperlipidemia)  Chronic systolic CHF (congestive heart failure)  Osteoarthritis  Atherosclerosis of coronary artery: CAD (coronary artery disease)  Status post percutaneous transluminal coronary angioplasty: in   S/P TKR (total knee replacement), right  S/P CABG x 1:   Stented coronary artery: 10/18 heart attack  INFERIOR WALL MI  Other postprocedural status: Fixation hardware in foot LEFT        SOCIAL HISTORY  Alcohol: Does not drink  Tobacco: Does not smoke  Illicit substance use: None      FAMILY HISTORY: Non contributory to the present illness        ALLERGIES: ACE inhibitors (Hives)  enalapril (Hives)      Vital Signs Last 24 Hrs  T(C): 36.1 (14 Dec 2019 20:58), Max: 36.3 (14 Dec 2019 05:35)  T(F): 96.9 (14 Dec 2019 20:58), Max: 97.4 (14 Dec 2019 05:35)  HR: 87 (14 Dec 2019 20:58) (75 - 87)  BP: 122/67 (14 Dec 2019 20:58) (112/65 - 127/69)  BP(mean): --  RR: 18 (14 Dec 2019 20:58) (18 - 19)  SpO2: --        PHYSICAL EXAM:  GENERAL: Not in distress   CHEST/LUNG:  Air entry bilaterally  HEART: s1 and s2 present  ABDOMEN:  Nontender and  Nondistended  EXTREMITIES: No pedal  edema  CNS: Awake and Alert      LABS:                        11.1   7.98  )-----------( 377      ( 14 Dec 2019 05:27 )             36.5             137  |  97<L>  |  28<H>  ----------------------------<  99  4.7   |  24  |  1.3    Ca    8.7      14 Dec 2019 05:27  Mg     2.0         TPro  6.8  /  Alb  3.6  /  TBili  1.4<H>  /  DBili  x   /  AST  51<H>  /  ALT  63<H>  /  AlkPhos  97        CAPILLARY BLOOD GLUCOSE  POCT Blood Glucose.: 166 mg/dL (14 Dec 2019 21:34)  POCT Blood Glucose.: 86 mg/dL (14 Dec 2019 16:37)  POCT Blood Glucose.: 182 mg/dL (14 Dec 2019 11:47)  POCT Blood Glucose.: 87 mg/dL (14 Dec 2019 07:58)        Urinalysis Basic - ( 13 Dec 2019 11:46 )  Color: Yellow / Appearance: Clear / S.014 / pH: x  Gluc: x / Ketone: Negative  / Bili: Negative / Urobili: <2 mg/dL   Blood: x / Protein: Trace / Nitrite: Negative   Leuk Esterase: Negative / RBC: x / WBC x   Sq Epi: x / Non Sq Epi: x / Bacteria: x        MEDICATIONS  (STANDING):  aspirin enteric coated 81 milliGRAM(s) Oral daily  chlorhexidine 4% Liquid 1 Application(s) Topical <User Schedule>  dextrose 5%. 1000 milliLiter(s) (50 mL/Hr) IV Continuous <Continuous>  dextrose 50% Injectable 12.5 Gram(s) IV Push once  dextrose 50% Injectable 25 Gram(s) IV Push once  dextrose 50% Injectable 25 Gram(s) IV Push once  DULoxetine 90 milliGRAM(s) Oral daily  enoxaparin Injectable 40 milliGRAM(s) SubCutaneous daily  furosemide    Tablet 40 milliGRAM(s) Oral daily  insulin glargine Injectable (LANTUS) 18 Unit(s) SubCutaneous at bedtime  insulin lispro (HumaLOG) corrective regimen sliding scale   SubCutaneous three times a day before meals  metoprolol succinate  milliGRAM(s) Oral daily  pantoprazole    Tablet 40 milliGRAM(s) Oral before breakfast  polyethylene glycol 3350 17 Gram(s) Oral daily  prasugrel 10 milliGRAM(s) Oral daily  rifAMPin 300 milliGRAM(s) Oral every 12 hours  vancomycin  IVPB 1000 milliGRAM(s) IV Intermittent every 12 hours    MEDICATIONS  (PRN):  acetaminophen   Tablet .. 650 milliGRAM(s) Oral every 6 hours PRN Mild Pain (1 - 3)  dextrose 40% Gel 15 Gram(s) Oral once PRN Blood Glucose LESS THAN 70 milliGRAM(s)/deciliter  glucagon  Injectable 1 milliGRAM(s) IntraMuscular once PRN Glucose LESS THAN 70 milligrams/deciliter  oxycodone    5 mG/acetaminophen 325 mG 1 Tablet(s) Oral every 6 hours PRN Severe Pain (7 - 10)  senna 2 Tablet(s) Oral at bedtime PRN Constipation          RADIOLOGY & ADDITIONAL TESTS:    < from: Xray Chest 1 View-PORTABLE IMMEDIATE (19 @ 07:45) >    Impression:      No radiographic evidence of acute cardiopulmonary disease.    < end of copied text >

## 2019-12-15 LAB
ALBUMIN SERPL ELPH-MCNC: 3.3 G/DL — LOW (ref 3.5–5.2)
ALP SERPL-CCNC: 96 U/L — SIGNIFICANT CHANGE UP (ref 30–115)
ALT FLD-CCNC: 61 U/L — HIGH (ref 0–41)
ANION GAP SERPL CALC-SCNC: 13 MMOL/L — SIGNIFICANT CHANGE UP (ref 7–14)
AST SERPL-CCNC: 45 U/L — HIGH (ref 0–41)
BASOPHILS # BLD AUTO: 0.12 K/UL — SIGNIFICANT CHANGE UP (ref 0–0.2)
BASOPHILS NFR BLD AUTO: 1.4 % — HIGH (ref 0–1)
BILIRUB SERPL-MCNC: 1.5 MG/DL — HIGH (ref 0.2–1.2)
BUN SERPL-MCNC: 29 MG/DL — HIGH (ref 10–20)
CALCIUM SERPL-MCNC: 8.7 MG/DL — SIGNIFICANT CHANGE UP (ref 8.5–10.1)
CHLORIDE SERPL-SCNC: 96 MMOL/L — LOW (ref 98–110)
CO2 SERPL-SCNC: 28 MMOL/L — SIGNIFICANT CHANGE UP (ref 17–32)
CREAT SERPL-MCNC: 1.3 MG/DL — SIGNIFICANT CHANGE UP (ref 0.7–1.5)
EOSINOPHIL # BLD AUTO: 0.33 K/UL — SIGNIFICANT CHANGE UP (ref 0–0.7)
EOSINOPHIL NFR BLD AUTO: 3.8 % — SIGNIFICANT CHANGE UP (ref 0–8)
GLUCOSE BLDC GLUCOMTR-MCNC: 148 MG/DL — HIGH (ref 70–99)
GLUCOSE BLDC GLUCOMTR-MCNC: 153 MG/DL — HIGH (ref 70–99)
GLUCOSE BLDC GLUCOMTR-MCNC: 186 MG/DL — HIGH (ref 70–99)
GLUCOSE BLDC GLUCOMTR-MCNC: 229 MG/DL — HIGH (ref 70–99)
GLUCOSE SERPL-MCNC: 187 MG/DL — HIGH (ref 70–99)
HCT VFR BLD CALC: 38 % — LOW (ref 42–52)
HGB BLD-MCNC: 11.3 G/DL — LOW (ref 14–18)
IMM GRANULOCYTES NFR BLD AUTO: 0.2 % — SIGNIFICANT CHANGE UP (ref 0.1–0.3)
LYMPHOCYTES # BLD AUTO: 1.04 K/UL — LOW (ref 1.2–3.4)
LYMPHOCYTES # BLD AUTO: 11.9 % — LOW (ref 20.5–51.1)
MAGNESIUM SERPL-MCNC: 1.9 MG/DL — SIGNIFICANT CHANGE UP (ref 1.8–2.4)
MCHC RBC-ENTMCNC: 23.9 PG — LOW (ref 27–31)
MCHC RBC-ENTMCNC: 29.7 G/DL — LOW (ref 32–37)
MCV RBC AUTO: 80.5 FL — SIGNIFICANT CHANGE UP (ref 80–94)
MONOCYTES # BLD AUTO: 1.26 K/UL — HIGH (ref 0.1–0.6)
MONOCYTES NFR BLD AUTO: 14.5 % — HIGH (ref 1.7–9.3)
NEUTROPHILS # BLD AUTO: 5.94 K/UL — SIGNIFICANT CHANGE UP (ref 1.4–6.5)
NEUTROPHILS NFR BLD AUTO: 68.2 % — SIGNIFICANT CHANGE UP (ref 42.2–75.2)
NRBC # BLD: 0 /100 WBCS — SIGNIFICANT CHANGE UP (ref 0–0)
PLATELET # BLD AUTO: 398 K/UL — SIGNIFICANT CHANGE UP (ref 130–400)
POTASSIUM SERPL-MCNC: 4.8 MMOL/L — SIGNIFICANT CHANGE UP (ref 3.5–5)
POTASSIUM SERPL-SCNC: 4.8 MMOL/L — SIGNIFICANT CHANGE UP (ref 3.5–5)
PROT SERPL-MCNC: 6.8 G/DL — SIGNIFICANT CHANGE UP (ref 6–8)
RBC # BLD: 4.72 M/UL — SIGNIFICANT CHANGE UP (ref 4.7–6.1)
RBC # FLD: 18.3 % — HIGH (ref 11.5–14.5)
SODIUM SERPL-SCNC: 137 MMOL/L — SIGNIFICANT CHANGE UP (ref 135–146)
WBC # BLD: 8.71 K/UL — SIGNIFICANT CHANGE UP (ref 4.8–10.8)
WBC # FLD AUTO: 8.71 K/UL — SIGNIFICANT CHANGE UP (ref 4.8–10.8)

## 2019-12-15 PROCEDURE — 99233 SBSQ HOSP IP/OBS HIGH 50: CPT

## 2019-12-15 RX ADMIN — ENOXAPARIN SODIUM 40 MILLIGRAM(S): 100 INJECTION SUBCUTANEOUS at 11:48

## 2019-12-15 RX ADMIN — Medication 0.12 MILLIGRAM(S): at 05:14

## 2019-12-15 RX ADMIN — Medication 2: at 17:43

## 2019-12-15 RX ADMIN — Medication 250 MILLIGRAM(S): at 17:47

## 2019-12-15 RX ADMIN — POLYETHYLENE GLYCOL 3350 17 GRAM(S): 17 POWDER, FOR SOLUTION ORAL at 11:47

## 2019-12-15 RX ADMIN — INSULIN GLARGINE 18 UNIT(S): 100 INJECTION, SOLUTION SUBCUTANEOUS at 22:06

## 2019-12-15 RX ADMIN — PANTOPRAZOLE SODIUM 40 MILLIGRAM(S): 20 TABLET, DELAYED RELEASE ORAL at 05:14

## 2019-12-15 RX ADMIN — PRASUGREL 10 MILLIGRAM(S): 5 TABLET, FILM COATED ORAL at 11:48

## 2019-12-15 RX ADMIN — Medication 40 MILLIGRAM(S): at 05:14

## 2019-12-15 RX ADMIN — DULOXETINE HYDROCHLORIDE 90 MILLIGRAM(S): 30 CAPSULE, DELAYED RELEASE ORAL at 11:48

## 2019-12-15 RX ADMIN — Medication 81 MILLIGRAM(S): at 11:49

## 2019-12-15 RX ADMIN — Medication 100 MILLIGRAM(S): at 05:14

## 2019-12-15 RX ADMIN — Medication 250 MILLIGRAM(S): at 05:14

## 2019-12-15 NOTE — CONSULT NOTE ADULT - SUBJECTIVE AND OBJECTIVE BOX
Podiatry Consult Note    Subjective:   FLOWER MARISCAL is a pleasant well-nourished, well developed 64y Male in no acute distress, alert awake, and oriented to person, place and time.   Patient is a 64y old  Male who presents with a chief complaint of CHF exacerbation (14 Dec 2019 22:51)  Patient was seen bedside today During AM rounds. Patient explains that stubbed his Left Hallux and subsequently bleeding occurred. Currently patient denies any pain or any pedal complaints;    HPI:  64 year old male with PMH of CAD, MI in 2018 s/p CABG and PCI (RCA stent with restenosis while on Plavix) on DAPT, DM II, DLD, Celiac, HFrEF  s/p Right TKA on 10/21/19 complicated by surgical site infection admitted to [facility] 11/22-11/26 s/p washout and on PICC antibiotics for ORSA (last day 1/6/2020) presents to the ED complaining dyspnea and orthopnea. Patient reports that he was dyspneic for the past couple of days so he took extra 20 mg of Lasix and last night he woke up feeling very woozy and lightheaded so he presented to the ED. Pt reports compliance with diet and meds. Echo 12/2018 showed EF of 40-45% mild pulm HTN and repeat echo 10/24/19 showed EF of 30% and severe pulm HTN.     In ED: Hemodynamically stable. Troponin 0.03, pBNP 6292. EKG with ventricular arrhythmia/block (unchanged from prior). Duplex lower extremity negative. CXR with vascular congestion but unchanged from prior (pending report)  Administered Lasix 40 mg IV once, Zofran 4 mg IV once (12 Dec 2019 11:39)    Past Medical History and Surgical History  History of celiac disease  CHF (congestive heart failure)  HTN (hypertension)  Diabetes  Blood clot due to device, implant, or graft: was on blood thinners  HLD (hyperlipidemia)  Chronic systolic CHF (congestive heart failure)  Osteoarthritis  Atherosclerosis of coronary artery: CAD (coronary artery disease)  Status post percutaneous transluminal coronary angioplasty: in 2012  S/P TKR (total knee replacement), right  S/P CABG x 1: 2018  Stented coronary artery: 10/18 heart attack  INFERIOR WALL MI  Other postprocedural status: Fixation hardware in foot LEFT     Review of Systems:   Constitutional: No weakness, fevers or chills  Eyes / ENT: No visual changes; No vertigo or throat pain   Neck: No pain or stiffness  Respiratory: No cough, wheezing, hemoptysis; No shortness of breath  Cardiovascular: No chest pain or palpitations  Gastrointestinal: No abdominal or epigastric pain. No nausea, vomiting, or hematemesis; No diarrhea or constipation. No melena or hematochezia.  Genitourinary: No dysuria, frequency or hematuria  Neurological: No numbness or weakness  Skin: Open Wound on the Left Hallux and Right 3rd Dorsal Digit     Objective:  Vital Signs Last 24 Hrs  T(C): 36.7 (15 Dec 2019 05:00), Max: 36.7 (15 Dec 2019 05:00)  T(F): 98 (15 Dec 2019 05:00), Max: 98 (15 Dec 2019 05:00)  HR: 80 (15 Dec 2019 05:00) (75 - 87)  BP: 151/89 (15 Dec 2019 05:00) (112/65 - 151/89)  BP(mean): --  RR: 19 (15 Dec 2019 05:00) (18 - 19)  SpO2: --                        11.3   8.71  )-----------( 398      ( 15 Dec 2019 05:43 )             38.0                 12-15    137  |  96<L>  |  29<H>  ----------------------------<  187<H>  4.8   |  28  |  1.3    Ca    8.7      15 Dec 2019 05:43  Mg     1.9     12-15    TPro  6.8  /  Alb  3.3<L>  /  TBili  1.5<H>  /  DBili  x   /  AST  45<H>  /  ALT  61<H>  /  AlkPhos  96  12-15    Physical Exam - Lower Extremity Focused:   Derm:   Superficial Wound on the Left Hallux Distal Nail; No Active Bleeding Currently  Superficial Wound on the Right 3rd Dorsal Digit   Dried Eschar Pressure Ulcer on the Right Posterior Heel    Vascular: DP and PT Pulses Diminished B/L; Foot was Warm to Warm to the touch   Neuro: Protective Sensation Diminished     Assessment:   Superficial Nail Wound Left Hallux; No Active Bleeds or Signs of Infection  Superficial Dermal Wound on Right 3rd Digit; No Signs of Infection   No Current Active Bleeds or Unstable Wounds     Plan:  Chart reviewed and Patient evaluated. All Questions and concerns were addressed and answered   Discussed diagnosis and treatment with patient  Wound Flushed w/ Normal Saline; Applied Xeroform on Left Hallux and Right 3rd Digit / DSD / Joesph Wrap   Local Wound Care; As Stated Above   Wounds are Stable w/o Signs of Active Infection;   Patient Can follow Up w/ Dr. Hooper Post Discharge for Continued Foot Care   Attending Updated

## 2019-12-15 NOTE — PROGRESS NOTE ADULT - SUBJECTIVE AND OBJECTIVE BOX
Progress Note:  Provider Speciality                            Hospitalist      FLOWER MARISCAL [id number] 64y Male     CHIEF PRESENTING COMPLAINT:  Patient is a 64y old  Male who presents with a chief complaint of CHF exacerbation (15 Dec 2019 13:04)        SUBJECTIVE:  Patient was seen and examined at bedside. Reports improvement in  presenting complaint. No significant overnight events reported.     HISTORY OF PRESENTING ILLNESS:  HPI:  64 year old male with PMH of CAD, MI in 2018 s/p CABG and PCI (RCA stent with restenosis while on Plavix) on DAPT, DM II, DLD, Celiac, HFrEF  s/p Right TKA on 10/21/19 complicated by surgical site infection admitted to [facility] 11/22-11/26 s/p washout and on PICC antibiotics for ORSA (last day 1/6/2020) presents to the ED complaining dyspnea and orthopnea. Patient reports that he was dyspneic for the past couple of days so he took extra 20 mg of Lasix and last night he woke up feeling very woozy and lightheaded so he presented to the ED. Pt reports compliance with diet and meds. Echo 12/2018 showed EF of 40-45% mild pulm HTN and repeat echo 10/24/19 showed EF of 30% and severe pulm HTN.     In ED: Hemodynamically stable. Troponin 0.03, pBNP 6292. EKG with ventricular arrhythmia/block (unchanged from prior). Duplex lower extremity negative. CXR with vascular congestion but unchanged from prior (pending report)  Administered Lasix 40 mg IV once, Zofran 4 mg IV once (12 Dec 2019 11:39)        REVIEW OF SYSTEMS:  Patient denies any headache, any vision complaints, runny nose, fever, chills, sore throat. Denies chest pain, shortness of breath, palpitation. Denies nausea, vomiting, abdominal pain, diarrhoea, Denies urinary burning, urgency, frequency, dysuria. Denies weakness in any part of the body or numbness.   At least 10 systems were reviewed in ROS. All systems reviewed  are within normal limits except for the complaints as described in Subjective.    PAST MEDICAL & SURGICAL HISTORY:  PAST MEDICAL & SURGICAL HISTORY:  History of celiac disease  CHF (congestive heart failure)  HTN (hypertension)  Diabetes  Blood clot due to device, implant, or graft: was on blood thinners  HLD (hyperlipidemia)  Chronic systolic CHF (congestive heart failure)  Osteoarthritis  Atherosclerosis of coronary artery: CAD (coronary artery disease)  Status post percutaneous transluminal coronary angioplasty: in 2012  S/P TKR (total knee replacement), right  S/P CABG x 1: 2018  Stented coronary artery: 10/18 heart attack  INFERIOR WALL MI  Other postprocedural status: Fixation hardware in foot LEFT          VITAL SIGNS:  Vital Signs Last 24 Hrs  T(C): 36.4 (15 Dec 2019 13:00), Max: 36.7 (15 Dec 2019 05:00)  T(F): 97.6 (15 Dec 2019 13:00), Max: 98 (15 Dec 2019 05:00)  HR: 80 (15 Dec 2019 13:00) (80 - 87)  BP: 120/70 (15 Dec 2019 13:00) (120/70 - 151/89)  BP(mean): --  RR: 18 (15 Dec 2019 13:00) (18 - 19)  SpO2: --          PHYSICAL EXAMINATION:  Not in acute distress  General: No pallor, no icterus  HEENT:   EOMI, no JVD,.  Heart: S1+S2 audible  Lungs: bilateral  fair air entry, no wheezing, no crepitations.  Abdomen: Soft, non-tender, non-distended , no  rigidity or guarding.  CNS: AAOx3, CN  grossly intact.  Extremities:  No edema            CONSULTS:  Consultant(s) Notes Reviewed by me.   Care Discussed with Consultants/Other Providers where required.        MEDICATIONS:  MEDICATIONS  (STANDING):  aspirin enteric coated 81 milliGRAM(s) Oral daily  chlorhexidine 4% Liquid 1 Application(s) Topical <User Schedule>  dextrose 5%. 1000 milliLiter(s) (50 mL/Hr) IV Continuous <Continuous>  dextrose 50% Injectable 12.5 Gram(s) IV Push once  dextrose 50% Injectable 25 Gram(s) IV Push once  dextrose 50% Injectable 25 Gram(s) IV Push once  digoxin     Tablet 0.125 milliGRAM(s) Oral daily  DULoxetine 90 milliGRAM(s) Oral daily  enoxaparin Injectable 40 milliGRAM(s) SubCutaneous daily  furosemide    Tablet 40 milliGRAM(s) Oral daily  insulin glargine Injectable (LANTUS) 18 Unit(s) SubCutaneous at bedtime  insulin lispro (HumaLOG) corrective regimen sliding scale   SubCutaneous three times a day before meals  metolazone 2.5 milliGRAM(s) Oral daily  metoprolol succinate  milliGRAM(s) Oral daily  pantoprazole    Tablet 40 milliGRAM(s) Oral before breakfast  polyethylene glycol 3350 17 Gram(s) Oral daily  prasugrel 10 milliGRAM(s) Oral daily  rifAMPin 300 milliGRAM(s) Oral every 12 hours  vancomycin  IVPB 1000 milliGRAM(s) IV Intermittent every 12 hours    MEDICATIONS  (PRN):  acetaminophen   Tablet .. 650 milliGRAM(s) Oral every 6 hours PRN Mild Pain (1 - 3)  dextrose 40% Gel 15 Gram(s) Oral once PRN Blood Glucose LESS THAN 70 milliGRAM(s)/deciliter  glucagon  Injectable 1 milliGRAM(s) IntraMuscular once PRN Glucose LESS THAN 70 milligrams/deciliter  oxycodone    5 mG/acetaminophen 325 mG 1 Tablet(s) Oral every 6 hours PRN Severe Pain (7 - 10)  senna 2 Tablet(s) Oral at bedtime PRN Constipation            ASSESSMENT: Progress Note:  Provider Speciality                            Hospitalist      FLOWER MARISCAL [id number] 64y Male     CHIEF PRESENTING COMPLAINT:  Patient is a 64y old  Male who presents with a chief complaint of CHF exacerbation (15 Dec 2019 13:04)        SUBJECTIVE:  Patient was seen and examined at bedside. Reports improvement in  presenting complaint. No significant overnight events reported.     HISTORY OF PRESENTING ILLNESS:  HPI:  64 year old male with PMH of CAD, MI in 2018 s/p CABG and PCI (RCA stent with restenosis while on Plavix) on DAPT, DM II, DLD, Celiac, HFrEF  s/p Right TKA on 10/21/19 complicated by surgical site infection admitted to [facility] 11/22-11/26 s/p washout and on PICC antibiotics for ORSA (last day 1/6/2020) presents to the ED complaining dyspnea and orthopnea. Patient reports that he was dyspneic for the past couple of days so he took extra 20 mg of Lasix and last night he woke up feeling very woozy and lightheaded so he presented to the ED. Pt reports compliance with diet and meds. Echo 12/2018 showed EF of 40-45% mild pulm HTN and repeat echo 10/24/19 showed EF of 30% and severe pulm HTN.     In ED: Hemodynamically stable. Troponin 0.03, pBNP 6292. EKG with ventricular arrhythmia/block (unchanged from prior). Duplex lower extremity negative. CXR with vascular congestion but unchanged from prior (pending report)  Administered Lasix 40 mg IV once, Zofran 4 mg IV once (12 Dec 2019 11:39)        REVIEW OF SYSTEMS:  Patient denies any headache, any vision complaints, runny nose, fever, chills, sore throat. Denies chest pain, shortness of breath, palpitation. Denies nausea, vomiting, abdominal pain, diarrhoea, Denies urinary burning, urgency, frequency, dysuria. Denies weakness in any part of the body or numbness.   At least 10 systems were reviewed in ROS. All systems reviewed  are within normal limits except for the complaints as described in Subjective.    PAST MEDICAL & SURGICAL HISTORY:  PAST MEDICAL & SURGICAL HISTORY:  History of celiac disease  CHF (congestive heart failure)  HTN (hypertension)  Diabetes  Blood clot due to device, implant, or graft: was on blood thinners  HLD (hyperlipidemia)  Chronic systolic CHF (congestive heart failure)  Osteoarthritis  Atherosclerosis of coronary artery: CAD (coronary artery disease)  Status post percutaneous transluminal coronary angioplasty: in 2012  S/P TKR (total knee replacement), right  S/P CABG x 1: 2018  Stented coronary artery: 10/18 heart attack  INFERIOR WALL MI  Other postprocedural status: Fixation hardware in foot LEFT          VITAL SIGNS:  Vital Signs Last 24 Hrs  T(C): 36.4 (15 Dec 2019 13:00), Max: 36.7 (15 Dec 2019 05:00)  T(F): 97.6 (15 Dec 2019 13:00), Max: 98 (15 Dec 2019 05:00)  HR: 80 (15 Dec 2019 13:00) (80 - 87)  BP: 120/70 (15 Dec 2019 13:00) (120/70 - 151/89)  BP(mean): --  RR: 18 (15 Dec 2019 13:00) (18 - 19)  SpO2: --          PHYSICAL EXAMINATION:  Not in acute distress  General: No pallor, no icterus  HEENT:   EOMI, no JVD,.  Heart: S1+S2 audible  Lungs: bilateral  fair air entry, no wheezing, no crepitations.  Abdomen: Soft, non-tender, non-distended , no  rigidity or guarding.  CNS: AAOx3, CN  grossly intact.  Extremities:  No edema            CONSULTS:  Consultant(s) Notes Reviewed by me.   Care Discussed with Consultants/Other Providers where required.        MEDICATIONS:  MEDICATIONS  (STANDING):  aspirin enteric coated 81 milliGRAM(s) Oral daily  chlorhexidine 4% Liquid 1 Application(s) Topical <User Schedule>  dextrose 5%. 1000 milliLiter(s) (50 mL/Hr) IV Continuous <Continuous>  dextrose 50% Injectable 12.5 Gram(s) IV Push once  dextrose 50% Injectable 25 Gram(s) IV Push once  dextrose 50% Injectable 25 Gram(s) IV Push once  digoxin     Tablet 0.125 milliGRAM(s) Oral daily  DULoxetine 90 milliGRAM(s) Oral daily  enoxaparin Injectable 40 milliGRAM(s) SubCutaneous daily  furosemide    Tablet 40 milliGRAM(s) Oral daily  insulin glargine Injectable (LANTUS) 18 Unit(s) SubCutaneous at bedtime  insulin lispro (HumaLOG) corrective regimen sliding scale   SubCutaneous three times a day before meals  metolazone 2.5 milliGRAM(s) Oral daily  metoprolol succinate  milliGRAM(s) Oral daily  pantoprazole    Tablet 40 milliGRAM(s) Oral before breakfast  polyethylene glycol 3350 17 Gram(s) Oral daily  prasugrel 10 milliGRAM(s) Oral daily  rifAMPin 300 milliGRAM(s) Oral every 12 hours  vancomycin  IVPB 1000 milliGRAM(s) IV Intermittent every 12 hours    MEDICATIONS  (PRN):  acetaminophen   Tablet .. 650 milliGRAM(s) Oral every 6 hours PRN Mild Pain (1 - 3)  dextrose 40% Gel 15 Gram(s) Oral once PRN Blood Glucose LESS THAN 70 milliGRAM(s)/deciliter  glucagon  Injectable 1 milliGRAM(s) IntraMuscular once PRN Glucose LESS THAN 70 milligrams/deciliter  oxycodone    5 mG/acetaminophen 325 mG 1 Tablet(s) Oral every 6 hours PRN Severe Pain (7 - 10)  senna 2 Tablet(s) Oral at bedtime PRN Constipation            ASSESSMENT:    64 year old male with PMH of CAD, MI in 2018 s/p CABG and PCI  (RCA stent with restenosis while on Plavix) on DAPT, DM II, DLD, Celiac, HFrEF  s/p R TKA on 10/21/19 complicated by surgical site infection admitted to [facility] 11/22-11/26 s/p washout and on PICC antibiotics for ORSA (last day 1/6/2020) presents to the ED complaining dyspnea and orthopnea.    Assessment & Plan:  -Acute  HFrEF exacerbation, Repeat TTE shows worsening of EF from 30 to 10%  -Recalled cardiology today if further ischemia w/u is warranted- awaiting follow up  - Lasix 40 mg PO daily, adding metolazone  before Lasix   - Continue Toprol & digoxin  -blood culture still awaited for ID clearance for AICD  - Surgical wound infection s/p washout  - Right TKA on 10/21/19 complicated by surgical site infection admitted to [facility] 11/22-11/26  - Wound culture with ORSA  - Continue Vancomycin and Rifampin via PICC (last day 1/6/20)  -CAD - Continue ASA, Effient, Toprol  -DM2-sliding scale coverage with lantu  - Hx of Celiac disease - Gluten/gliadin free diet  Pending: cardiology f/u , Blood cx  Disposition :Home when stable

## 2019-12-16 ENCOUNTER — TRANSCRIPTION ENCOUNTER (OUTPATIENT)
Age: 64
End: 2019-12-16

## 2019-12-16 VITALS — OXYGEN SATURATION: 99 %

## 2019-12-16 LAB
ALBUMIN SERPL ELPH-MCNC: 3.5 G/DL — SIGNIFICANT CHANGE UP (ref 3.5–5.2)
ALP SERPL-CCNC: 92 U/L — SIGNIFICANT CHANGE UP (ref 30–115)
ALT FLD-CCNC: 53 U/L — HIGH (ref 0–41)
ANION GAP SERPL CALC-SCNC: 12 MMOL/L — SIGNIFICANT CHANGE UP (ref 7–14)
AST SERPL-CCNC: 35 U/L — SIGNIFICANT CHANGE UP (ref 0–41)
BASOPHILS # BLD AUTO: 0.1 K/UL — SIGNIFICANT CHANGE UP (ref 0–0.2)
BASOPHILS NFR BLD AUTO: 1.2 % — HIGH (ref 0–1)
BILIRUB SERPL-MCNC: 1.3 MG/DL — HIGH (ref 0.2–1.2)
BUN SERPL-MCNC: 25 MG/DL — HIGH (ref 10–20)
CALCIUM SERPL-MCNC: 8.8 MG/DL — SIGNIFICANT CHANGE UP (ref 8.5–10.1)
CHLORIDE SERPL-SCNC: 94 MMOL/L — LOW (ref 98–110)
CO2 SERPL-SCNC: 28 MMOL/L — SIGNIFICANT CHANGE UP (ref 17–32)
CREAT SERPL-MCNC: 1.2 MG/DL — SIGNIFICANT CHANGE UP (ref 0.7–1.5)
EOSINOPHIL # BLD AUTO: 0.38 K/UL — SIGNIFICANT CHANGE UP (ref 0–0.7)
EOSINOPHIL NFR BLD AUTO: 4.5 % — SIGNIFICANT CHANGE UP (ref 0–8)
GLUCOSE BLDC GLUCOMTR-MCNC: 173 MG/DL — HIGH (ref 70–99)
GLUCOSE BLDC GLUCOMTR-MCNC: 221 MG/DL — HIGH (ref 70–99)
GLUCOSE BLDC GLUCOMTR-MCNC: 232 MG/DL — HIGH (ref 70–99)
GLUCOSE SERPL-MCNC: 256 MG/DL — HIGH (ref 70–99)
HCT VFR BLD CALC: 36.5 % — LOW (ref 42–52)
HGB BLD-MCNC: 11.2 G/DL — LOW (ref 14–18)
IMM GRANULOCYTES NFR BLD AUTO: 0.4 % — HIGH (ref 0.1–0.3)
LYMPHOCYTES # BLD AUTO: 1.02 K/UL — LOW (ref 1.2–3.4)
LYMPHOCYTES # BLD AUTO: 12 % — LOW (ref 20.5–51.1)
MAGNESIUM SERPL-MCNC: 1.8 MG/DL — SIGNIFICANT CHANGE UP (ref 1.8–2.4)
MCHC RBC-ENTMCNC: 24.2 PG — LOW (ref 27–31)
MCHC RBC-ENTMCNC: 30.7 G/DL — LOW (ref 32–37)
MCV RBC AUTO: 78.8 FL — LOW (ref 80–94)
MONOCYTES # BLD AUTO: 1.41 K/UL — HIGH (ref 0.1–0.6)
MONOCYTES NFR BLD AUTO: 16.6 % — HIGH (ref 1.7–9.3)
NEUTROPHILS # BLD AUTO: 5.57 K/UL — SIGNIFICANT CHANGE UP (ref 1.4–6.5)
NEUTROPHILS NFR BLD AUTO: 65.3 % — SIGNIFICANT CHANGE UP (ref 42.2–75.2)
NRBC # BLD: 0 /100 WBCS — SIGNIFICANT CHANGE UP (ref 0–0)
PLATELET # BLD AUTO: 343 K/UL — SIGNIFICANT CHANGE UP (ref 130–400)
POTASSIUM SERPL-MCNC: 4.2 MMOL/L — SIGNIFICANT CHANGE UP (ref 3.5–5)
POTASSIUM SERPL-SCNC: 4.2 MMOL/L — SIGNIFICANT CHANGE UP (ref 3.5–5)
PROT SERPL-MCNC: 6.5 G/DL — SIGNIFICANT CHANGE UP (ref 6–8)
RBC # BLD: 4.63 M/UL — LOW (ref 4.7–6.1)
RBC # FLD: 18.2 % — HIGH (ref 11.5–14.5)
SODIUM SERPL-SCNC: 134 MMOL/L — LOW (ref 135–146)
WBC # BLD: 8.51 K/UL — SIGNIFICANT CHANGE UP (ref 4.8–10.8)
WBC # FLD AUTO: 8.51 K/UL — SIGNIFICANT CHANGE UP (ref 4.8–10.8)

## 2019-12-16 PROCEDURE — 99238 HOSP IP/OBS DSCHRG MGMT 30/<: CPT

## 2019-12-16 PROCEDURE — 78472 GATED HEART PLANAR SINGLE: CPT | Mod: 26

## 2019-12-16 PROCEDURE — 99233 SBSQ HOSP IP/OBS HIGH 50: CPT

## 2019-12-16 RX ORDER — DIGOXIN 250 MCG
1 TABLET ORAL
Qty: 30 | Refills: 0
Start: 2019-12-16 | End: 2020-01-14

## 2019-12-16 RX ORDER — FUROSEMIDE 40 MG
1 TABLET ORAL
Qty: 0 | Refills: 0 | DISCHARGE

## 2019-12-16 RX ORDER — FUROSEMIDE 40 MG
1 TABLET ORAL
Qty: 30 | Refills: 0
Start: 2019-12-16 | End: 2020-01-14

## 2019-12-16 RX ORDER — DIGOXIN 250 MCG
1 TABLET ORAL
Qty: 0 | Refills: 0 | DISCHARGE
Start: 2019-12-16

## 2019-12-16 RX ADMIN — PRASUGREL 10 MILLIGRAM(S): 5 TABLET, FILM COATED ORAL at 12:12

## 2019-12-16 RX ADMIN — Medication 100 MILLIGRAM(S): at 05:39

## 2019-12-16 RX ADMIN — Medication 2: at 12:11

## 2019-12-16 RX ADMIN — Medication 40 MILLIGRAM(S): at 05:38

## 2019-12-16 RX ADMIN — Medication 4: at 16:38

## 2019-12-16 RX ADMIN — Medication 0.12 MILLIGRAM(S): at 05:38

## 2019-12-16 RX ADMIN — Medication 250 MILLIGRAM(S): at 05:37

## 2019-12-16 RX ADMIN — ENOXAPARIN SODIUM 40 MILLIGRAM(S): 100 INJECTION SUBCUTANEOUS at 12:11

## 2019-12-16 RX ADMIN — OXYCODONE AND ACETAMINOPHEN 1 TABLET(S): 5; 325 TABLET ORAL at 06:40

## 2019-12-16 RX ADMIN — PANTOPRAZOLE SODIUM 40 MILLIGRAM(S): 20 TABLET, DELAYED RELEASE ORAL at 05:38

## 2019-12-16 RX ADMIN — OXYCODONE AND ACETAMINOPHEN 1 TABLET(S): 5; 325 TABLET ORAL at 05:38

## 2019-12-16 RX ADMIN — Medication 81 MILLIGRAM(S): at 12:12

## 2019-12-16 RX ADMIN — DULOXETINE HYDROCHLORIDE 90 MILLIGRAM(S): 30 CAPSULE, DELAYED RELEASE ORAL at 12:12

## 2019-12-16 NOTE — DISCHARGE NOTE NURSING/CASE MANAGEMENT/SOCIAL WORK - PATIENT PORTAL LINK FT
You can access the FollowMyHealth Patient Portal offered by [facility] by registering at the following website: [email or web address]. By joining [employer]’s FollowMyHealth portal, you will also be able to view your health information using other applications (apps) compatible with our system.

## 2019-12-16 NOTE — DISCHARGE NOTE PROVIDER - NSDCMRMEDTOKEN_GEN_ALL_CORE_FT
acetaminophen 325 mg oral tablet: 2 tab(s) orally every 6 hours  aspirin 81 mg oral delayed release tablet: 1 tab(s) orally 2 times a day. CONTINUE UNTIL 1 MONTH AFTER SURGERY (). THEN DECREASE DOSE BACK TO ONCE DAILY.  Crestor 40 mg oral tablet: 1 tab(s) orally once a day  Cymbalta: 90 milligram(s) orally once a day  furosemide 40 mg oral tablet: 1 tab(s) orally once a day  Jardiance 10 mg oral tablet: 1 tab(s) orally once a day (in the morning)  Metoprolol Succinate  mg oral tablet, extended release: 1 tab(s) orally once a day  ondansetron 4 mg oral tablet, disintegratin tab(s) orally 3 times a day -for nausea   oxycodone-acetaminophen 5 mg-325 mg oral tablet: 1-2 tab(s) orally every 4-6 hours, as needed for moderate to severe pain  pantoprazole 40 mg oral delayed release tablet: 1 tab(s) orally once a day (before a meal)  polyethylene glycol 3350 oral powder for reconstitution: 17 gram(s) orally once a day  prasugrel 10 mg oral tablet: 1 tab(s) orally once a day  rifAMPin 150 mg oral capsule: 2 cap(s) orally every 12 hours for 6 weeks  senna oral tablet: 2 tab(s) orally once a day (at bedtime), As needed, Constipation  Trulicity Pen 1.5 mg/0.5 mL subcutaneous solution:   vancomycin 1g in NS 250ml every 12 hours : Last date of administration: 1/3/20    diagnosis: T84.53XA  Zetia 10 mg oral tablet: 1 tab(s) orally once a day

## 2019-12-16 NOTE — PROGRESS NOTE ADULT - SUBJECTIVE AND OBJECTIVE BOX
FLOWER MARISCAL  [facility]      Patient is a 64y old  Male who presents with a chief complaint of CHF exacerbation (15 Dec 2019 18:25)        -PMHx: History of celiac disease [Active]  CHF (congestive heart failure) [Active]  HTN (hypertension) [Active]  Diabetes [Active]  Blood clot due to device, implant, or graft [Active]  CKD (chronic kidney disease) stage 2, GFR 60-89 ml/min [Inactive]  COPD, moderate [Inactive]  Hyperkalemia [Inactive]  HLD (hyperlipidemia) [Active]  Chronic systolic CHF (congestive heart failure) [Active]  Osteoarthritis [Active]  HTN (hypertension) [Inactive]  Type 2 diabetes mellitus with neurological manifestations [Inactive]  Type 2 diabetes mellitus [Inactive]  History of surgery to heart and great vessels, presenting hazards to health [Inactive]  Atherosclerosis of coronary artery [Active]  Status post percutaneous transluminal coronary angioplasty [Active]    -PSHx:S/P TKR (total knee replacement), right  S/P CABG x 1  Stented coronary artery  History of surgery to major organs, presenting hazards to health  Other postprocedural status      Interval events:  Patient seen and examined at bedside. No acute events overnight. Patient denies any fever/chills/cp/sob.     REVIEW OF SYSTEMS:  CONSTITUTIONAL: No fever, weight loss, or fatigue  RESPIRATORY: No cough, wheezing, chills or hemoptysis; No shortness of breath  CARDIOVASCULAR: No chest pain, palpitations, dizziness, or leg swelling  GASTROINTESTINAL: No abdominal or epigastric pain. No nausea, vomiting, or hematemesis; No diarrhea or constipation. No melena or hematochezia.  NEUROLOGICAL: No headaches  LYMPH NODES: No enlarged glands  MUSCULOSKELETAL: No joint pain or swelling; No muscle, back, or extremity pain      T(C): , Max: 36.6 (12-15-19 @ 21:00)  HR: 84 (12-16-19 @ 05:16)  BP: 118/78 (12-16-19 @ 05:35)  RR: 20 (12-16-19 @ 05:16)  SpO2: --  CAPILLARY BLOOD GLUCOSE      POCT Blood Glucose.: 232 mg/dL (16 Dec 2019 07:28)  POCT Blood Glucose.: 229 mg/dL (15 Dec 2019 21:37)  POCT Blood Glucose.: 186 mg/dL (15 Dec 2019 16:37)  POCT Blood Glucose.: 148 mg/dL (15 Dec 2019 11:31)      PHYSICAL EXAM:  General: NAD, no pallor, no icterus  HEENT:   EOMI, no JVD,.  Heart: S1+S2 audible  Lungs: bilateral  fair air entry, no wheezing, no crepitations.  Abdomen: Soft, non-tender, non-distended , no  rigidity or guarding.  CNS: AAOx3, CN  grossly intact.  Extremities:  No edema        LABS:          11.2  8.51  )-------(343          36.5  N=65.3  L=12.0  MCV=78.8    134<L>|94<L>|25<H>  ------------------<256<H>  4.2|28|1.2  eGFR:64  Ca:8.8            Microbiology:    Culture - Blood (collected 12-14-19 @ 12:18)  Source: .Blood None  Preliminary Report (12-15-19 @ 20:01):    No growth to date.        RADIOLOGY & ADDITIONAL TESTS:        Medications:  acetaminophen   Tablet .. 650 milliGRAM(s) Oral every 6 hours PRN  aspirin enteric coated 81 milliGRAM(s) Oral daily  chlorhexidine 4% Liquid 1 Application(s) Topical <User Schedule>  dextrose 40% Gel 15 Gram(s) Oral once PRN  dextrose 5%. 1000 milliLiter(s) IV Continuous <Continuous>  dextrose 50% Injectable 12.5 Gram(s) IV Push once  dextrose 50% Injectable 25 Gram(s) IV Push once  dextrose 50% Injectable 25 Gram(s) IV Push once  digoxin     Tablet 0.125 milliGRAM(s) Oral daily  DULoxetine 90 milliGRAM(s) Oral daily  enoxaparin Injectable 40 milliGRAM(s) SubCutaneous daily  furosemide    Tablet 40 milliGRAM(s) Oral daily  glucagon  Injectable 1 milliGRAM(s) IntraMuscular once PRN  insulin glargine Injectable (LANTUS) 18 Unit(s) SubCutaneous at bedtime  insulin lispro (HumaLOG) corrective regimen sliding scale   SubCutaneous three times a day before meals  metolazone 2.5 milliGRAM(s) Oral daily  metoprolol succinate  milliGRAM(s) Oral daily  oxycodone    5 mG/acetaminophen 325 mG 1 Tablet(s) Oral every 6 hours PRN  pantoprazole    Tablet 40 milliGRAM(s) Oral before breakfast  polyethylene glycol 3350 17 Gram(s) Oral daily  prasugrel 10 milliGRAM(s) Oral daily  rifAMPin 300 milliGRAM(s) Oral every 12 hours  senna 2 Tablet(s) Oral at bedtime PRN  vancomycin  IVPB 1000 milliGRAM(s) IV Intermittent every 12 hours

## 2019-12-16 NOTE — PROGRESS NOTE ADULT - ASSESSMENT
Patient is a 64y old  Male who presents with a chief complaint of CHF exacerbation (16 Dec 2019 09:15)    Impression:  Ischemic cardiomyopathy=> Patient ALLERGIC to ACEi/ARB and Entresto   CAD s/p CABG and PCI   Acute decompensated HFrEF (congestive heart failure)-improved today  HTN (hypertension)  Diabetes  Hx of NSVT while hospitalized in [facility]  MRSA infection on IV antibiotics     Recommendations:   Obtain MUGA scan during this admission  Given that it has been less than 3 months since last echo (EF 30-35%), patient needs to be on maximally tolerated medical therapy before CRT-D.   Can be discharged home on LifeVest and outpatient follow-up in end of January for re-evaluating the need for CRT-D.  Plan discuss at length with the patient. Patient is a 64y old  Male who presents with a chief complaint of CHF exacerbation (16 Dec 2019 09:15)    Impression:  Ischemic cardiomyopathy LVEF reported as 30 % by echo 10/23/19  (LVEF  40-46%) 2018    => Patient ALLERGIC to ACEi/ARB and Entresto   CAD s/p CABG and PCI   Acute decompensated HFrEF (congestive heart failure)-improved today  HTN (hypertension)  Diabetes  Hx of NSVT while hospitalized in [facility]  MRSA infection on IV antibiotics     Recommendations:   Otimize med rherapy  for CHF  Obtain MUGA scan during this admission  Given that it has been less than 3 months since last echo (EF 30-35%), patient needs to be on maximally tolerated medical therapy before CRT-D.   Can be discharged home on LifeVest and outpatient follow-up in end of January for re-evaluating the need for CRT-D.  Plan discuss at length with the patient. PT IS agreable  to  this therapy and life vest

## 2019-12-16 NOTE — PROGRESS NOTE ADULT - SUBJECTIVE AND OBJECTIVE BOX
Patient is a 64y old  Male who presents with a chief complaint of CHF exacerbation (16 Dec 2019 09:15)      Subjective:  Patient seen and examined at bedside.        Review Of Systems: No chest pain, shortness of breath, or palpitations            Medications:  acetaminophen   Tablet .. 650 milliGRAM(s) Oral every 6 hours PRN  aspirin enteric coated 81 milliGRAM(s) Oral daily  chlorhexidine 4% Liquid 1 Application(s) Topical <User Schedule>  dextrose 40% Gel 15 Gram(s) Oral once PRN  dextrose 5%. 1000 milliLiter(s) IV Continuous <Continuous>  dextrose 50% Injectable 12.5 Gram(s) IV Push once  dextrose 50% Injectable 25 Gram(s) IV Push once  dextrose 50% Injectable 25 Gram(s) IV Push once  digoxin     Tablet 0.125 milliGRAM(s) Oral daily  DULoxetine 90 milliGRAM(s) Oral daily  enoxaparin Injectable 40 milliGRAM(s) SubCutaneous daily  furosemide    Tablet 40 milliGRAM(s) Oral daily  glucagon  Injectable 1 milliGRAM(s) IntraMuscular once PRN  insulin glargine Injectable (LANTUS) 18 Unit(s) SubCutaneous at bedtime  insulin lispro (HumaLOG) corrective regimen sliding scale   SubCutaneous three times a day before meals  metolazone 2.5 milliGRAM(s) Oral daily  metoprolol succinate  milliGRAM(s) Oral daily  oxycodone    5 mG/acetaminophen 325 mG 1 Tablet(s) Oral every 6 hours PRN  pantoprazole    Tablet 40 milliGRAM(s) Oral before breakfast  polyethylene glycol 3350 17 Gram(s) Oral daily  prasugrel 10 milliGRAM(s) Oral daily  rifAMPin 300 milliGRAM(s) Oral every 12 hours  senna 2 Tablet(s) Oral at bedtime PRN  vancomycin  IVPB 1000 milliGRAM(s) IV Intermittent every 12 hours      PAST MEDICAL & SURGICAL HISTORY:  History of celiac disease  CHF (congestive heart failure)  HTN (hypertension)  Diabetes  Blood clot due to device, implant, or graft: was on blood thinners  HLD (hyperlipidemia)  Chronic systolic CHF (congestive heart failure)  Osteoarthritis  Atherosclerosis of coronary artery: CAD (coronary artery disease)  Status post percutaneous transluminal coronary angioplasty: in 2012  S/P TKR (total knee replacement), right  S/P CABG x 1: 2018  Stented coronary artery: 10/18 heart attack  INFERIOR WALL MI  Other postprocedural status: Fixation hardware in foot LEFT      Objective:  Vitals:  T(F): 96.3 (12-16), Max: 97.9 (12-15)  HR: 84 (12-16) (80 - 87)  BP: 118/78 (12-16) (103/73 - 138/84)  RR: 20 (12-16)  SpO2: --  I&O's Summary    15 Dec 2019 07:01  -  16 Dec 2019 07:00  --------------------------------------------------------  IN: 200 mL / OUT: 300 mL / NET: -100 mL        Physical Exam:  Appearance: No acute distress; well appearing  Eyes: PERRL, EOMI, pink conjunctiva  HENT: Normal oral muscosa  Cardiovascular: RRR, S1, S2, no murmurs, rubs, or gallops; no edema; no JVD  Respiratory: bibasilar crackles  Gastrointestinal: soft, non-tender, non-distended with normal bowel sounds  Musculoskeletal: No clubbing; no joint deformity. Right knee scar.   Neurologic: Non-focal  Lymphatic: No lymphadenopathy  Psychiatry: AAOx3, mood & affect appropriate  Skin: No rashes, ecchymoses, or cyanosis                          11.2   8.51  )-----------( 343      ( 16 Dec 2019 05:27 )             36.5     12-16    134<L>  |  94<L>  |  25<H>  ----------------------------<  256<H>  4.2   |  28  |  1.2    Ca    8.8      16 Dec 2019 05:27  Mg     1.8     12-16    TPro  6.5  /  Alb  3.5  /  TBili  1.3<H>  /  DBili  x   /  AST  35  /  ALT  53<H>  /  AlkPhos  92  12-16          Serum Pro-Brain Natriuretic Peptide: 6292 pg/mL (12-12 @ 07:37)          New ECG(s): Personally reviewed    Echo:  < from: Transthoracic Echocardiogram (12.13.19 @ 10:55) >  Summary:   1. Severely decreased global left ventricular systolic function.   2. Multiple left ventricular regional wall motion abnormalities exist.   See wall motion findings.   3. Elevated left atrial and left ventricular end-diastolic pressures.   4. Spectral Doppler shows restrictive pattern of left ventricular   myocardial filling (Grade III diastolic dysfunction).   5. The LVEF by triplane Wilkerson's technique is 11%. The mean global   longitudinal peak strain by speckle tracking is -5.6% which is markedly   reduced.   6. Mild mitral valve regurgitation.   7. Mild-moderate tricuspid regurgitation.   8. Estimated pulmonary artery systolic pressure is 78.6 mmHg assuming a   right atrial pressure of 15 mmHg, which is consistent with severe   pulmonary hypertension.   9. Pulmonary hypertension is present.  10. LA volume Index is 56.9 ml/m² ml/m2.    < end of copied text >

## 2019-12-16 NOTE — DISCHARGE NOTE PROVIDER - PROVIDER TOKENS
PROVIDER:[TOKEN:[[id number]],FOLLOWUP:[1 month]],PROVIDER:[TOKEN:[[id number]],FOLLOWUP:[Routine]]

## 2019-12-16 NOTE — DISCHARGE NOTE PROVIDER - NSDCCPCAREPLAN_GEN_ALL_CORE_FT
PRINCIPAL DISCHARGE DIAGNOSIS  Diagnosis: CHF exacerbation  Assessment and Plan of Treatment: You had an exacerbation of your congestive heart failure. Your ECHO revealed that your ejection fraction, the percentage that represents the pumping function of your heart, had decreased. Cardiology and electrophysiology were contemplating having an AICD placed during this admission, but because of your recent wound infection in the knee, it was decided that you will follow up with electrophysiology at the end of January in order to have a re-evaluation for AICD placement. You will be discharged home with a Lifevest, and take all your medications as prescribed. If you experience worsening shortness of breath, chest pain, or palpitations please seek a medical professional.

## 2019-12-16 NOTE — DISCHARGE NOTE PROVIDER - HOSPITAL COURSE
HPI:    64 year old male with PMH of CAD, MI in 2018 s/p CABG and PCI (RCA stent with restenosis while on Plavix) on DAPT, DM II, DLD, Celiac, HFrEF  s/p Right TKA on 10/21/19 complicated by surgical site infection admitted to [facility] 11/22-11/26 s/p washout and on PICC antibiotics for ORSA (last day 1/6/2020) presents to the ED complaining dyspnea and orthopnea. Patient reports that he was dyspneic for the past couple of days so he took extra 20 mg of Lasix and last night he woke up feeling very woozy and lightheaded so he presented to the ED. Pt reports compliance with diet and meds. Echo 12/2018 showed EF of 40-45% mild pulm HTN and repeat echo 10/24/19 showed EF of 30% and severe pulm HTN.         In ED: Hemodynamically stable. Troponin 0.03, pBNP 6292. EKG with ventricular arrhythmia/block (unchanged from prior). Duplex lower extremity negative. CXR with vascular congestion but unchanged from prior (pending report)    Administered Lasix 40 mg IV once, Zofran 4 mg IV once (12 Dec 2019 11:39)        Patient admitted for acute exacerbation of congestive heart failure. Cardiology was consulted, patient was diuresed with lasix and metolazone. He had NSVT recently at [facility] but was unable to take amiodarone, so EPS consulted here and put on digoxin. Patient was to HPI:    64 year old male with PMH of CAD, MI in 2018 s/p CABG and PCI (RCA stent with restenosis while on Plavix) on DAPT, DM II, DLD, Celiac, HFrEF  s/p Right TKA on 10/21/19 complicated by surgical site infection admitted to [facility] 11/22-11/26 s/p washout and on PICC antibiotics for ORSA (last day 1/6/2020) presents to the ED complaining dyspnea and orthopnea. Patient reports that he was dyspneic for the past couple of days so he took extra 20 mg of Lasix and last night he woke up feeling very woozy and lightheaded so he presented to the ED. Pt reports compliance with diet and meds. Echo 12/2018 showed EF of 40-45% mild pulm HTN and repeat echo 10/24/19 showed EF of 30% and severe pulm HTN.         In ED: Hemodynamically stable. Troponin 0.03, pBNP 6292. EKG with ventricular arrhythmia/block (unchanged from prior). Duplex lower extremity negative. CXR with vascular congestion but unchanged from prior (pending report)    Administered Lasix 40 mg IV once, Zofran 4 mg IV once (12 Dec 2019 11:39)        Patient admitted for acute exacerbation of congestive heart failure. Cardiology was consulted, patient was diuresed with lasix and metolazone. He had NSVT recently at [facility] but was unable to take amiodarone, so EPS consulted here and put on digoxin. Patient was to have AICD placed but did not have consecutive negative blood cultures. EPS wanted MUGA scan and will be discharged home with LifeVest and outpatient follow up at the end of January for re-evaluation for placement of AICD.

## 2019-12-16 NOTE — PROGRESS NOTE ADULT - ASSESSMENT
64 year old male with PMH of CAD, MI in 2018 s/p CABG and PCI  (RCA stent with restenosis while on Plavix) on DAPT, DM II, DLD, Celiac, HFrEF  s/p R TKA on 10/21/19 complicated by surgical site infection admitted to [facility] 11/22-11/26 s/p washout and on PICC antibiotics for ORSA (last day 1/6/2020) presents to the ED complaining dyspnea and orthopnea.    # HFrEF exacerbation  - 2D-echo 10/24/19 showed EF of 30%, RV/LV mildly dilated and severe pulm HTN.  - pBNP 6292, Troponin 0.03 likely demand  - Lungs clear on exam; CXR with vascular congestion but unchanged from prior (pending report)  - Likely combined RHF/LHF - s/p Lasix 40 mg IV in ED; would avoid over-diuresing  -c/w Lasix, metolzaone   - Continue Toprol  - Was not able to tolerate Entresto in past  - Cardiology eval (Dr Lopez) appreciated, pending ID clearance from AICD placement    # Intraventricular block / ventricular arrhythmia  - Seen on previous EKG; ventricular arrhythmia mentioned in previous cardiology note (11/26/19) and described as asymptomatic  - On Metoprolol succinate 100 mg daily  - EPS: Digoxin 0.125 daily. Will need LifeVest for now until infection clears, [employer] rep on board.   - f/u blood cultures x2 sets and ID consult if patient can receive the AICD.     # Surgical wound infection s/p washout  - Right TKA on 10/21/19 complicated by surgical site infection admitted to [facility] 11/22-11/26  - Wound culture with ORSA  - Continue Vancomycin and Rifampin via PICC (last day 1/6/20)    # CAD and MI s/p CABG and PCI (RCA) in 2018  - Plavix failure in past  - Continue ASA/Effient, Toprol  - Was not able to tolerate ACE and ARB as well as Entresto in past  - On Lipitor but currently on hold while on Rifampin due to elevated LFTs    # DM II with retinopathy - controlled  - HbA1C 10/22/19 7.5  - On Jardiance and Trulicity at home  - Check fingersticks  - Start insulin regimen if >180 consistently  - Carb consistent diet    # DLD  - On Crestor 40 mg at home  - On hold while on Rifampin per ID for elevated LFTs    # Hx of Celiac disease - Gluten/gliadin free diet

## 2019-12-16 NOTE — PROGRESS NOTE ADULT - ASSESSMENT
64 year old male with PMH of CAD, MI in 2018 s/p CABG and PCI (RCA stent with restenosis while on Plavix) on DAPT, DM II, DLD, Celiac, HFrEF  s/p Right TKA on 10/21/19 complicated by surgical site infection admitted to [facility] 11/22-11/26 s/p washout and on PICC antibiotics for ORSA (last day 1/6/2020) presents to the ED complaining dyspnea and orthopnea for the last 2 days.      1.	SOB likely due to Sub acute HFrEF  2.	CAD S/P CABG resistant to Plavix  3.	ICM with EF 30%  4.	H/O NSVT at [facility].   5.	Recent Rt. TKA at [facility] and wound infection (ORSA) on Abx   6.	DM-2 / DL  7.	Celiac disease         PLAN:    ·	EP F/U noted. Recommended MUGA scan and D/C him on Life vest and out pt F/U  ·	Cont Lasix 40 mg po daily.  ·	Check i's and o's and daily wt  ·	Low salt diet and water restriction to 1.5 L/D  ·	Care D/W the cardiology  ·	EP recommended to start him on Amiodarone but pt is refusing to take  ·	Pt is intolerant to ACE-I/ARB. On BB  ·	Cont his Abx for Rt knee infection.   ·	Monitor FS. and cont him on Lantus/Humalog

## 2019-12-16 NOTE — DISCHARGE NOTE PROVIDER - CARE PROVIDER_API CALL
Jennifer Parrish)  Cardiology; Internal Medicine  [address] [place] [address], [place] [address]  Phone: [phone]  Fax: [phone]  Follow Up Time: 1 month    Mihir Lopez)  Cardiovascular Disease; Internal Medicine; Interventional Cardiology  [facility] [place] [address]  Phone: [phone]  Fax: [phone]  Follow Up Time: Routine

## 2019-12-16 NOTE — DISCHARGE NOTE PROVIDER - NSDCFUSCHEDAPPT_GEN_ALL_CORE_FT
FLOWER MARISCAL ; 12/19/2019 ; [facility] [address] [facility] NPP G[facility] [address] FLOWER MARISCAL ; 12/19/2019 ; [facility] [address] [facility] ; 12/27/[facility] ; NPP Gastro [address]

## 2019-12-16 NOTE — PROGRESS NOTE ADULT - SUBJECTIVE AND OBJECTIVE BOX
FLOWER MARISCAL  64y Male    CHIEF COMPLAINT:    Patient is a 64y old  Male who presents with a chief complaint of CHF exacerbation (16 Dec 2019 10:21)      INTERVAL HPI/OVERNIGHT EVENTS:    Patient seen and examined. No sob. No cp. No palpitations. No fever. MUGA scan     ROS: All other systems are negative.    Vital Signs:    T(F): 96.3 (19 @ 05:16), Max: 97.9 (12-15-19 @ 21:00)  HR: 84 (19 @ 05:16) (80 - 87)  BP: 118/78 (19 @ 05:35) (103/73 - 138/84)  RR: 20 (19 @ 05:16) (18 - 20)  SpO2: --  I&O's Summary    15 Dec 2019 07:01  -  16 Dec 2019 07:00  --------------------------------------------------------  IN: 200 mL / OUT: 300 mL / NET: -100 mL      Daily     Daily Weight in k (16 Dec 2019 05:16)  CAPILLARY BLOOD GLUCOSE      POCT Blood Glucose.: 173 mg/dL (16 Dec 2019 11:50)  POCT Blood Glucose.: 232 mg/dL (16 Dec 2019 07:28)  POCT Blood Glucose.: 229 mg/dL (15 Dec 2019 21:37)  POCT Blood Glucose.: 186 mg/dL (15 Dec 2019 16:37)      PHYSICAL EXAM:    GENERAL:  NAD  SKIN: No rashes or lesions  HENT: Atraumatic Normocephalic. PERRL. Moist membranes.  NECK: Supple, No JVD. No lymphadenopathy.  PULMONARY: CTA B/L. No wheezing. No rales  CVS: Normal S1, S2. Rate and Rhythm are regular. No murmurs.  ABDOMEN/GI: Soft, Nontender, Nondistended; BS present  EXTREMITIES: Peripheral pulses intact. No edema B/L LE.  NEUROLOGIC:  No motor or sensory deficit.  PSYCH: Alert & oriented x 3    Consultant(s) Notes Reviewed:  [x ] YES  [ ] NO  Care Discussed with Consultants/Other Providers [ x] YES  [ ] NO    EKG reviewed  Telemetry reviewed    LABS:                        11.2   8.51  )-----------( 343      ( 16 Dec 2019 05:27 )             36.5     12    134<L>  |  94<L>  |  25<H>  ----------------------------<  256<H>  4.2   |  28  |  1.2    Ca    8.8      16 Dec 2019 05:27  Mg     1.8         TPro  6.5  /  Alb  3.5  /  TBili  1.3<H>  /  DBili  x   /  AST  35  /  ALT  53<H>  /  AlkPhos  92        Serum Pro-Brain Natriuretic Peptide: 6292 pg/mL (19 @ 07:37)        Culture - Blood (collected 14 Dec 2019 12:18)  Source: .Blood None  Preliminary Report (15 Dec 2019 20:01):    No growth to date.        RADIOLOGY & ADDITIONAL TESTS:      Imaging or report Personally Reviewed:  [ ] YES  [ ] NO    Medications:  Standing  aspirin enteric coated 81 milliGRAM(s) Oral daily  chlorhexidine 4% Liquid 1 Application(s) Topical <User Schedule>  dextrose 5%. 1000 milliLiter(s) IV Continuous <Continuous>  dextrose 50% Injectable 12.5 Gram(s) IV Push once  dextrose 50% Injectable 25 Gram(s) IV Push once  dextrose 50% Injectable 25 Gram(s) IV Push once  digoxin     Tablet 0.125 milliGRAM(s) Oral daily  DULoxetine 90 milliGRAM(s) Oral daily  enoxaparin Injectable 40 milliGRAM(s) SubCutaneous daily  furosemide    Tablet 40 milliGRAM(s) Oral daily  insulin glargine Injectable (LANTUS) 18 Unit(s) SubCutaneous at bedtime  insulin lispro (HumaLOG) corrective regimen sliding scale   SubCutaneous three times a day before meals  metolazone 2.5 milliGRAM(s) Oral daily  metoprolol succinate  milliGRAM(s) Oral daily  pantoprazole    Tablet 40 milliGRAM(s) Oral before breakfast  polyethylene glycol 3350 17 Gram(s) Oral daily  prasugrel 10 milliGRAM(s) Oral daily  rifAMPin 300 milliGRAM(s) Oral every 12 hours  vancomycin  IVPB 1000 milliGRAM(s) IV Intermittent every 12 hours    PRN Meds  acetaminophen   Tablet .. 650 milliGRAM(s) Oral every 6 hours PRN  dextrose 40% Gel 15 Gram(s) Oral once PRN  glucagon  Injectable 1 milliGRAM(s) IntraMuscular once PRN  oxycodone    5 mG/acetaminophen 325 mG 1 Tablet(s) Oral every 6 hours PRN  senna 2 Tablet(s) Oral at bedtime PRN      Case discussed with resident    Care discussed with pt/family

## 2019-12-19 ENCOUNTER — APPOINTMENT (OUTPATIENT)
Dept: ORTHOPEDIC SURGERY | Facility: CLINIC | Age: 64
End: 2019-12-19

## 2019-12-19 LAB
CULTURE RESULTS: SIGNIFICANT CHANGE UP
SPECIMEN SOURCE: SIGNIFICANT CHANGE UP

## 2019-12-20 ENCOUNTER — APPOINTMENT (OUTPATIENT)
Dept: ORTHOPEDIC SURGERY | Facility: CLINIC | Age: 64
End: 2019-12-20
Payer: COMMERCIAL

## 2019-12-20 ENCOUNTER — LABORATORY RESULT (OUTPATIENT)
Age: 64
End: 2019-12-20

## 2019-12-20 ENCOUNTER — APPOINTMENT (OUTPATIENT)
Dept: ORTHOPEDIC SURGERY | Facility: CLINIC | Age: 64
End: 2019-12-20

## 2019-12-20 VITALS
HEIGHT: 73 IN | DIASTOLIC BLOOD PRESSURE: 80 MMHG | OXYGEN SATURATION: 93 % | SYSTOLIC BLOOD PRESSURE: 124 MMHG | HEART RATE: 105 BPM | WEIGHT: 240 LBS | BODY MASS INDEX: 31.81 KG/M2
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
## 2019-12-20 DIAGNOSIS — J44.9 CHRONIC OBSTRUCTIVE PULMONARY DISEASE, UNSPECIFIED: ICD-10-CM

## 2019-12-20 DIAGNOSIS — Z88.8 ALLERGY STATUS TO OTHER DRUGS, MEDICAMENTS AND BIOLOGICAL SUBSTANCES: ICD-10-CM

## 2019-12-20 DIAGNOSIS — I25.5 ISCHEMIC CARDIOMYOPATHY: ICD-10-CM
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
## 2019-12-20 DIAGNOSIS — I08.1 RHEUMATIC DISORDERS OF BOTH MITRAL AND TRICUSPID VALVES: ICD-10-CM

## 2019-12-20 DIAGNOSIS — I96 OTHER POSTPROCEDURAL COMPLICATIONS AND DISORDERS OF THE CIRCULATORY SYSTEM, NOT ELSEWHERE CLASSIFIED: ICD-10-CM

## 2019-12-20 DIAGNOSIS — E87.5 HYPERKALEMIA: ICD-10-CM

## 2019-12-20 DIAGNOSIS — I25.2 OLD MYOCARDIAL INFARCTION: ICD-10-CM

## 2019-12-20 DIAGNOSIS — N18.2 CHRONIC KIDNEY DISEASE, STAGE 2 (MILD): ICD-10-CM

## 2019-12-20 DIAGNOSIS — Z79.82 LONG TERM (CURRENT) USE OF ASPIRIN: ICD-10-CM

## 2019-12-20 DIAGNOSIS — M19.90 UNSPECIFIED OSTEOARTHRITIS, UNSPECIFIED SITE: ICD-10-CM

## 2019-12-20 DIAGNOSIS — E78.5 HYPERLIPIDEMIA, UNSPECIFIED: ICD-10-CM

## 2019-12-20 DIAGNOSIS — L89.619 PRESSURE ULCER OF RIGHT HEEL, UNSPECIFIED STAGE: ICD-10-CM

## 2019-12-20 DIAGNOSIS — E11.319 TYPE 2 DIABETES MELLITUS WITH UNSPECIFIED DIABETIC RETINOPATHY WITHOUT MACULAR EDEMA: ICD-10-CM

## 2019-12-20 DIAGNOSIS — I13.0 HYPERTENSIVE HEART AND CHRONIC KIDNEY DISEASE WITH HEART FAILURE AND STAGE 1 THROUGH STAGE 4 CHRONIC KIDNEY DISEASE, OR UNSPECIFIED CHRONIC KIDNEY DISEASE: ICD-10-CM

## 2019-12-20 DIAGNOSIS — I97.89 OTHER POSTPROCEDURAL COMPLICATIONS AND DISORDERS OF THE CIRCULATORY SYSTEM, NOT ELSEWHERE CLASSIFIED: ICD-10-CM

## 2019-12-20 DIAGNOSIS — S91.212A LACERATION WITHOUT FOREIGN BODY OF LEFT GREAT TOE WITH DAMAGE TO NAIL, INITIAL ENCOUNTER: ICD-10-CM

## 2019-12-20 DIAGNOSIS — K90.0 CELIAC DISEASE: ICD-10-CM

## 2019-12-20 DIAGNOSIS — Z79.2 LONG TERM (CURRENT) USE OF ANTIBIOTICS: ICD-10-CM
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
## 2019-12-20 DIAGNOSIS — T81.40XD INFECTION FOLLOWING A PROCEDURE, UNSPECIFIED, SUBSEQUENT ENCOUNTER: ICD-10-CM

## 2019-12-20 DIAGNOSIS — I25.10 ATHEROSCLEROTIC HEART DISEASE OF NATIVE CORONARY ARTERY WITHOUT ANGINA PECTORIS: ICD-10-CM
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
## 2019-12-20 DIAGNOSIS — S91.114A LACERATION WITHOUT FOREIGN BODY OF RIGHT LESSER TOE(S) WITHOUT DAMAGE TO NAIL, INITIAL ENCOUNTER: ICD-10-CM

## 2019-12-20 DIAGNOSIS — E11.49 TYPE 2 DIABETES MELLITUS WITH OTHER DIABETIC NEUROLOGICAL COMPLICATION: ICD-10-CM

## 2019-12-20 DIAGNOSIS — I50.23 ACUTE ON CHRONIC SYSTOLIC (CONGESTIVE) HEART FAILURE: ICD-10-CM

## 2019-12-20 DIAGNOSIS — Z95.1 PRESENCE OF AORTOCORONARY BYPASS GRAFT: ICD-10-CM

## 2019-12-20 DIAGNOSIS — I47.2 VENTRICULAR TACHYCARDIA: ICD-10-CM

## 2019-12-20 DIAGNOSIS — E11.22 TYPE 2 DIABETES MELLITUS WITH DIABETIC CHRONIC KIDNEY DISEASE: ICD-10-CM

## 2019-12-20 DIAGNOSIS — I50.9 HEART FAILURE, UNSPECIFIED: ICD-10-CM

## 2019-12-20 DIAGNOSIS — I27.20 PULMONARY HYPERTENSION, UNSPECIFIED: ICD-10-CM

## 2019-12-20 PROBLEM — Z87.19 PERSONAL HISTORY OF OTHER DISEASES OF THE DIGESTIVE SYSTEM: Chronic | Status: ACTIVE | Noted: 2019-12-12

## 2019-12-20 LAB
CULTURE RESULTS: SIGNIFICANT CHANGE UP
SPECIMEN SOURCE: SIGNIFICANT CHANGE UP

## 2019-12-20 PROCEDURE — 99024 POSTOP FOLLOW-UP VISIT: CPT

## 2019-12-20 NOTE — HISTORY OF PRESENT ILLNESS
[de-identified] : s/p R knee I&D, poly exchange for MRSA acute PJI on 11/22/19, index procedure 10/21/19 [de-identified] : Has been noncompliant with bracing instructions. Receiving rifampin through PICC. Pain about 5/10. Denies active drainage, but subjectively wound is not looking improved. [de-identified] : Multiple areas of eugene-shaped incisional necrosis, worst at central aspect. Moist fibrinous bed centrally. Surrounding erythema and superficial inflammation mostly proximally. No expressible drainage, no fluctuance. Trace effusion. Knee ROM not tested. [de-identified] : 65y/o male 4wk s/p R knee I&D/poly exchg for MRSA PJI with persistent delayed wound healing and incisional necrosis [de-identified] : Severity of this condition was reinforced with him and his wife. Possibly may required 2-stage revision including soft tissue coverage procedure. Situation d/w Dr. Estes; will also be seeing the patient a little later today.

## 2019-12-21 ENCOUNTER — INPATIENT (INPATIENT)
Facility: HOSPITAL | Age: 64
LOS: 5 days | Discharge: HOME HEALTH | DRG: 560 | End: 2019-12-27
Attending: ORTHOPAEDIC SURGERY | Admitting: ORTHOPAEDIC SURGERY
Payer: COMMERCIAL

## 2019-12-21 VITALS
OXYGEN SATURATION: 100 % | HEART RATE: 75 BPM | RESPIRATION RATE: 18 BRPM | DIASTOLIC BLOOD PRESSURE: 55 MMHG | WEIGHT: 235.01 LBS | SYSTOLIC BLOOD PRESSURE: 94 MMHG | HEIGHT: 73 IN | TEMPERATURE: 98 F

## 2019-12-21 DIAGNOSIS — Z95.1 PRESENCE OF AORTOCORONARY BYPASS GRAFT: Chronic | ICD-10-CM

## 2019-12-21 DIAGNOSIS — Z96.651 PRESENCE OF RIGHT ARTIFICIAL KNEE JOINT: Chronic | ICD-10-CM

## 2019-12-21 DIAGNOSIS — Z95.5 PRESENCE OF CORONARY ANGIOPLASTY IMPLANT AND GRAFT: Chronic | ICD-10-CM

## 2019-12-21 LAB
ALBUMIN SERPL ELPH-MCNC: 3.3 G/DL — SIGNIFICANT CHANGE UP (ref 3.3–5)
ALBUMIN SERPL ELPH-MCNC: 3.6 G/DL — SIGNIFICANT CHANGE UP (ref 3.3–5)
ALP SERPL-CCNC: 77 U/L — SIGNIFICANT CHANGE UP (ref 40–120)
ALP SERPL-CCNC: SIGNIFICANT CHANGE UP U/L (ref 40–120)
ALT FLD-CCNC: 25 U/L — SIGNIFICANT CHANGE UP (ref 10–45)
ALT FLD-CCNC: SIGNIFICANT CHANGE UP U/L (ref 10–45)
ANION GAP SERPL CALC-SCNC: 10 MMOL/L — SIGNIFICANT CHANGE UP (ref 5–17)
ANION GAP SERPL CALC-SCNC: 12 MMOL/L — SIGNIFICANT CHANGE UP (ref 5–17)
AST SERPL-CCNC: 19 U/L — SIGNIFICANT CHANGE UP (ref 10–40)
AST SERPL-CCNC: SIGNIFICANT CHANGE UP U/L (ref 10–40)
BASOPHILS # BLD AUTO: 0.13 K/UL — SIGNIFICANT CHANGE UP (ref 0–0.2)
BASOPHILS NFR BLD AUTO: 1.5 % — SIGNIFICANT CHANGE UP (ref 0–2)
BILIRUB SERPL-MCNC: 0.8 MG/DL — SIGNIFICANT CHANGE UP (ref 0.2–1.2)
BILIRUB SERPL-MCNC: 1 MG/DL — SIGNIFICANT CHANGE UP (ref 0.2–1.2)
BUN SERPL-MCNC: 19 MG/DL — SIGNIFICANT CHANGE UP (ref 7–23)
BUN SERPL-MCNC: 19 MG/DL — SIGNIFICANT CHANGE UP (ref 7–23)
CALCIUM SERPL-MCNC: 8.9 MG/DL — SIGNIFICANT CHANGE UP (ref 8.4–10.5)
CALCIUM SERPL-MCNC: 9 MG/DL — SIGNIFICANT CHANGE UP (ref 8.4–10.5)
CHLORIDE SERPL-SCNC: 100 MMOL/L — SIGNIFICANT CHANGE UP (ref 96–108)
CHLORIDE SERPL-SCNC: 103 MMOL/L — SIGNIFICANT CHANGE UP (ref 96–108)
CO2 SERPL-SCNC: 24 MMOL/L — SIGNIFICANT CHANGE UP (ref 22–31)
CO2 SERPL-SCNC: 27 MMOL/L — SIGNIFICANT CHANGE UP (ref 22–31)
CREAT SERPL-MCNC: 0.91 MG/DL — SIGNIFICANT CHANGE UP (ref 0.5–1.3)
CREAT SERPL-MCNC: 0.97 MG/DL — SIGNIFICANT CHANGE UP (ref 0.5–1.3)
CULTURE RESULTS: SIGNIFICANT CHANGE UP
EOSINOPHIL # BLD AUTO: 0.87 K/UL — HIGH (ref 0–0.5)
EOSINOPHIL NFR BLD AUTO: 10.3 % — HIGH (ref 0–6)
GLUCOSE BLDC GLUCOMTR-MCNC: 329 MG/DL — HIGH (ref 70–99)
GLUCOSE SERPL-MCNC: 167 MG/DL — HIGH (ref 70–99)
GLUCOSE SERPL-MCNC: 212 MG/DL — HIGH (ref 70–99)
HCT VFR BLD CALC: 36.1 % — LOW (ref 39–50)
HGB BLD-MCNC: 10.8 G/DL — LOW (ref 13–17)
IMM GRANULOCYTES NFR BLD AUTO: 0.2 % — SIGNIFICANT CHANGE UP (ref 0–1.5)
LYMPHOCYTES # BLD AUTO: 0.9 K/UL — LOW (ref 1–3.3)
LYMPHOCYTES # BLD AUTO: 10.6 % — LOW (ref 13–44)
MCHC RBC-ENTMCNC: 23.8 PG — LOW (ref 27–34)
MCHC RBC-ENTMCNC: 29.9 GM/DL — LOW (ref 32–36)
MCV RBC AUTO: 79.7 FL — LOW (ref 80–100)
MONOCYTES # BLD AUTO: 0.67 K/UL — SIGNIFICANT CHANGE UP (ref 0–0.9)
MONOCYTES NFR BLD AUTO: 7.9 % — SIGNIFICANT CHANGE UP (ref 2–14)
NEUTROPHILS # BLD AUTO: 5.87 K/UL — SIGNIFICANT CHANGE UP (ref 1.8–7.4)
NEUTROPHILS NFR BLD AUTO: 69.5 % — SIGNIFICANT CHANGE UP (ref 43–77)
NRBC # BLD: 0 /100 WBCS — SIGNIFICANT CHANGE UP (ref 0–0)
PLATELET # BLD AUTO: 285 K/UL — SIGNIFICANT CHANGE UP (ref 150–400)
POTASSIUM SERPL-MCNC: 4.4 MMOL/L — SIGNIFICANT CHANGE UP (ref 3.5–5.3)
POTASSIUM SERPL-MCNC: SIGNIFICANT CHANGE UP MMOL/L (ref 3.5–5.3)
POTASSIUM SERPL-SCNC: 4.4 MMOL/L — SIGNIFICANT CHANGE UP (ref 3.5–5.3)
POTASSIUM SERPL-SCNC: SIGNIFICANT CHANGE UP MMOL/L (ref 3.5–5.3)
PROT SERPL-MCNC: 7.3 G/DL — SIGNIFICANT CHANGE UP (ref 6–8.3)
PROT SERPL-MCNC: 7.4 G/DL — SIGNIFICANT CHANGE UP (ref 6–8.3)
RBC # BLD: 4.53 M/UL — SIGNIFICANT CHANGE UP (ref 4.2–5.8)
RBC # FLD: 18.7 % — HIGH (ref 10.3–14.5)
SODIUM SERPL-SCNC: 136 MMOL/L — SIGNIFICANT CHANGE UP (ref 135–145)
SODIUM SERPL-SCNC: 140 MMOL/L — SIGNIFICANT CHANGE UP (ref 135–145)
SPECIMEN SOURCE: SIGNIFICANT CHANGE UP
WBC # BLD: 8.46 K/UL — SIGNIFICANT CHANGE UP (ref 3.8–10.5)
WBC # FLD AUTO: 8.46 K/UL — SIGNIFICANT CHANGE UP (ref 3.8–10.5)

## 2019-12-21 PROCEDURE — 93010 ELECTROCARDIOGRAM REPORT: CPT

## 2019-12-21 PROCEDURE — 99285 EMERGENCY DEPT VISIT HI MDM: CPT

## 2019-12-21 RX ORDER — INSULIN LISPRO 100/ML
VIAL (ML) SUBCUTANEOUS
Refills: 0 | Status: DISCONTINUED | OUTPATIENT
Start: 2019-12-21 | End: 2019-12-21

## 2019-12-21 RX ORDER — OXYCODONE HYDROCHLORIDE 5 MG/1
5 TABLET ORAL EVERY 4 HOURS
Refills: 0 | Status: DISCONTINUED | OUTPATIENT
Start: 2019-12-21 | End: 2019-12-27

## 2019-12-21 RX ORDER — SODIUM CHLORIDE 9 MG/ML
1000 INJECTION, SOLUTION INTRAVENOUS
Refills: 0 | Status: DISCONTINUED | OUTPATIENT
Start: 2019-12-21 | End: 2019-12-27

## 2019-12-21 RX ORDER — OXYCODONE HYDROCHLORIDE 5 MG/1
10 TABLET ORAL EVERY 4 HOURS
Refills: 0 | Status: DISCONTINUED | OUTPATIENT
Start: 2019-12-21 | End: 2019-12-27

## 2019-12-21 RX ORDER — SENNA PLUS 8.6 MG/1
2 TABLET ORAL AT BEDTIME
Refills: 0 | Status: DISCONTINUED | OUTPATIENT
Start: 2019-12-21 | End: 2019-12-27

## 2019-12-21 RX ORDER — FUROSEMIDE 40 MG
40 TABLET ORAL DAILY
Refills: 0 | Status: DISCONTINUED | OUTPATIENT
Start: 2019-12-21 | End: 2019-12-27

## 2019-12-21 RX ORDER — ONDANSETRON 8 MG/1
4 TABLET, FILM COATED ORAL EVERY 6 HOURS
Refills: 0 | Status: DISCONTINUED | OUTPATIENT
Start: 2019-12-21 | End: 2019-12-24

## 2019-12-21 RX ORDER — PANTOPRAZOLE SODIUM 20 MG/1
40 TABLET, DELAYED RELEASE ORAL
Refills: 0 | Status: DISCONTINUED | OUTPATIENT
Start: 2019-12-22 | End: 2019-12-27

## 2019-12-21 RX ORDER — DEXTROSE 50 % IN WATER 50 %
15 SYRINGE (ML) INTRAVENOUS ONCE
Refills: 0 | Status: DISCONTINUED | OUTPATIENT
Start: 2019-12-21 | End: 2019-12-27

## 2019-12-21 RX ORDER — SENNA PLUS 8.6 MG/1
2 TABLET ORAL AT BEDTIME
Refills: 0 | Status: DISCONTINUED | OUTPATIENT
Start: 2019-12-21 | End: 2019-12-21

## 2019-12-21 RX ORDER — DEXTROSE 50 % IN WATER 50 %
25 SYRINGE (ML) INTRAVENOUS ONCE
Refills: 0 | Status: DISCONTINUED | OUTPATIENT
Start: 2019-12-21 | End: 2019-12-27

## 2019-12-21 RX ORDER — ACETAMINOPHEN 500 MG
650 TABLET ORAL EVERY 6 HOURS
Refills: 0 | Status: DISCONTINUED | OUTPATIENT
Start: 2019-12-21 | End: 2019-12-27

## 2019-12-21 RX ORDER — DIGOXIN 250 MCG
0.12 TABLET ORAL DAILY
Refills: 0 | Status: DISCONTINUED | OUTPATIENT
Start: 2019-12-21 | End: 2019-12-27

## 2019-12-21 RX ORDER — GLUCAGON INJECTION, SOLUTION 0.5 MG/.1ML
1 INJECTION, SOLUTION SUBCUTANEOUS ONCE
Refills: 0 | Status: DISCONTINUED | OUTPATIENT
Start: 2019-12-21 | End: 2019-12-27

## 2019-12-21 RX ORDER — INSULIN LISPRO 100/ML
VIAL (ML) SUBCUTANEOUS AT BEDTIME
Refills: 0 | Status: DISCONTINUED | OUTPATIENT
Start: 2019-12-21 | End: 2019-12-21

## 2019-12-21 RX ORDER — DULOXETINE HYDROCHLORIDE 30 MG/1
90 CAPSULE, DELAYED RELEASE ORAL DAILY
Refills: 0 | Status: DISCONTINUED | OUTPATIENT
Start: 2019-12-21 | End: 2019-12-27

## 2019-12-21 RX ORDER — VANCOMYCIN HCL 1 G
1000 VIAL (EA) INTRAVENOUS EVERY 12 HOURS
Refills: 0 | Status: DISCONTINUED | OUTPATIENT
Start: 2019-12-21 | End: 2019-12-24

## 2019-12-21 RX ORDER — ATORVASTATIN CALCIUM 80 MG/1
80 TABLET, FILM COATED ORAL AT BEDTIME
Refills: 0 | Status: DISCONTINUED | OUTPATIENT
Start: 2019-12-21 | End: 2019-12-24

## 2019-12-21 RX ORDER — METOPROLOL TARTRATE 50 MG
100 TABLET ORAL DAILY
Refills: 0 | Status: DISCONTINUED | OUTPATIENT
Start: 2019-12-21 | End: 2019-12-27

## 2019-12-21 RX ORDER — SODIUM CHLORIDE 9 MG/ML
250 INJECTION INTRAMUSCULAR; INTRAVENOUS; SUBCUTANEOUS ONCE
Refills: 0 | Status: COMPLETED | OUTPATIENT
Start: 2019-12-21 | End: 2019-12-21

## 2019-12-21 RX ORDER — POLYETHYLENE GLYCOL 3350 17 G/17G
17 POWDER, FOR SOLUTION ORAL DAILY
Refills: 0 | Status: DISCONTINUED | OUTPATIENT
Start: 2019-12-21 | End: 2019-12-27

## 2019-12-21 RX ORDER — DEXTROSE 50 % IN WATER 50 %
12.5 SYRINGE (ML) INTRAVENOUS ONCE
Refills: 0 | Status: DISCONTINUED | OUTPATIENT
Start: 2019-12-21 | End: 2019-12-27

## 2019-12-21 RX ORDER — MAGNESIUM HYDROXIDE 400 MG/1
30 TABLET, CHEWABLE ORAL DAILY
Refills: 0 | Status: DISCONTINUED | OUTPATIENT
Start: 2019-12-21 | End: 2019-12-27

## 2019-12-21 RX ORDER — HYDROMORPHONE HYDROCHLORIDE 2 MG/ML
0.5 INJECTION INTRAMUSCULAR; INTRAVENOUS; SUBCUTANEOUS EVERY 4 HOURS
Refills: 0 | Status: DISCONTINUED | OUTPATIENT
Start: 2019-12-21 | End: 2019-12-27

## 2019-12-21 RX ADMIN — Medication 250 MILLIGRAM(S): at 20:38

## 2019-12-21 RX ADMIN — ATORVASTATIN CALCIUM 80 MILLIGRAM(S): 80 TABLET, FILM COATED ORAL at 23:00

## 2019-12-21 RX ADMIN — SODIUM CHLORIDE 500 MILLILITER(S): 9 INJECTION INTRAMUSCULAR; INTRAVENOUS; SUBCUTANEOUS at 15:32

## 2019-12-21 NOTE — ED PROVIDER NOTE - PROGRESS NOTE DETAILS
Ortho consulted and will eval. Ortho in ed - tba to them; they are planning to consult plastics 2/2 wound necrosis.

## 2019-12-21 NOTE — ED PROVIDER NOTE - MUSCULOSKELETAL, MLM
Spine appears normal, range of motion is not limited, no muscle or joint tenderness, R knee w dehisces wound w small necrotic areas, erythema adjacent to prior incision site, no purulent drainage, min ttp, no sig warmth, RUE picc in place

## 2019-12-21 NOTE — H&P ADULT - NSHPPHYSICALEXAM_GEN_ALL_CORE
general: well appearing, In nad    RLE:  no swelling or effusion noted at knee  necrosis of TKA incision noted midline w some purulence and erythema   no active drainage   ROM not tested secondary to wound dehischence   SILT over distal limb and symmetric to contralateral side   2+ DP pulse, cap refill < 2 seconds   5/5 EHL/FHL/GS/TA

## 2019-12-21 NOTE — H&P ADULT - ASSESSMENT
64 M w necrosis of incision s/p TKA  - Admit to ortho  - Long leg splint in extension  - NWB RLE  - wet to dry dressing changes over incisions  - Cardio consult  - Plastics consult  - pain control  - PT

## 2019-12-21 NOTE — PATIENT PROFILE ADULT - NSPROMUTANXFEARADDRESSFT_GEN_A_NUR
Preventive Health Recommendations  Female Ages 50 - 64    Yearly exam: See your health care provider every year in order to  o Review health changes.   o Discuss preventive care.    o Review your medicines if your doctor has prescribed any.      Get a Pap test every three years (unless you have an abnormal result and your provider advises testing more often).    If you get Pap tests with HPV test, you only need to test every 5 years, unless you have an abnormal result.     You do not need a Pap test if your uterus was removed (hysterectomy) and you have not had cancer.    You should be tested each year for STDs (sexually transmitted diseases) if you're at risk.     Have a mammogram every 1 to 2 years.    Have a colonoscopy at age 50, or have a yearly FIT test (stool test). These exams screen for colon cancer.      Have a cholesterol test every 5 years, or more often if advised.    Have a diabetes test (fasting glucose) every three years. If you are at risk for diabetes, you should have this test more often.     If you are at risk for osteoporosis (brittle bone disease), think about having a bone density scan (DEXA).    Shots: Get a flu shot each year. Get a tetanus shot every 10 years.    Nutrition:     Eat at least 5 servings of fruits and vegetables each day.    Eat whole-grain bread, whole-wheat pasta and brown rice instead of white grains and rice.    Talk to your provider about Calcium and Vitamin D.     Lifestyle    Exercise at least 150 minutes a week (30 minutes a day, 5 days a week). This will help you control your weight and prevent disease.    Limit alcohol to one drink per day.    No smoking.     Wear sunscreen to prevent skin cancer.     See your dentist every six months for an exam and cleaning.    See your eye doctor every 1 to 2 years.     "what is going to happen next?"

## 2019-12-21 NOTE — ED PROVIDER NOTE - PSH
Other postprocedural status  Fixation hardware in foot LEFT  S/P CABG x 1  2018  S/P TKR (total knee replacement), right    Stented coronary artery  10/18 heart attack  INFERIOR WALL MI

## 2019-12-21 NOTE — ED PROVIDER NOTE - PMH
Atherosclerosis of coronary artery  CAD (coronary artery disease)  Blood clot due to device, implant, or graft  was on blood thinners  CHF (congestive heart failure)    Chronic systolic CHF (congestive heart failure)    Diabetes    History of celiac disease    HLD (hyperlipidemia)    HTN (hypertension)    Osteoarthritis    Status post percutaneous transluminal coronary angioplasty  in 2012

## 2019-12-21 NOTE — ED PROVIDER NOTE - OBJECTIVE STATEMENT
64 year old male with PMH of CAD, MI in 2018 s/p CABG and PCI (RCA stent with restenosis while on Plavix) on DAPT, DM II, DLD, Celiac, HFrEF s/p Right TKA on 10/21/19 complicated by surgical site infection admitted to [facility] 11/22-11/26 s/p washout and on PICC antibiotics for MRSA (last day 1/6/2020) recently dc'd 12/16 for chf exacerbation presents to the ED complaining 64 year old male with PMH of CAD, MI in 2018 s/p CABG and PCI (RCA stent with restenosis while on Plavix) on DAPT, DM II, DLD, Celiac, HFrEF s/p Right TKA on 10/21/19 complicated by surgical site infection admitted to [facility] 11/22-11/26 s/p washout and on PICC antibiotics for MRSA (last day 1/6/2020) recently dc'd 12/16 for chf exacerbation presents after eval w Dr Estes yest for continued orthopedic care and monitoring for his ongoing wound infection.  Pt noted wound dehiscence and purulent drainage starting in Oct w cont infection; currently on rifampin (off for a bit 2/2 lft elevation but restarted ~ 1 wk ago) and vanomycin.  Pt notes cont pain similar to prior w/o sig change.  No fever, n/v.  Pt reports his breathing is better after his last admit and diuresis.

## 2019-12-21 NOTE — ED ADULT NURSE NOTE - NSIMPLEMENTINTERV_GEN_ALL_ED
Implemented All Universal Safety Interventions:  [place] to call system. Call bell, personal items and telephone within reach. Instruct patient to call for assistance. Room bathroom lighting operational. Non-slip footwear when patient is off stretcher. Physically safe environment: no spills, clutter or unnecessary equipment. Stretcher in lowest position, wheels locked, appropriate side rails in place.

## 2019-12-21 NOTE — ED ADULT NURSE NOTE - OBJECTIVE STATEMENT
Patient presents to the ED with infection to right knee.  Patient has been getting IV antibiotics for MRSA infection for months.  Denies fevers, chills.  AA&OX3, respirations unlabored, non-diaphoretic, no pallor. Dressing to right knee.  PICC line to right upper arm, however no blood return.

## 2019-12-21 NOTE — H&P ADULT - HISTORY OF PRESENT ILLNESS
64 M with PMHx of CAD, MI in 2018 s/p CABG and PCI (RCA stent with restenosis while on Plavix) on DAPT, DM II, DLD, Celiac, HFrEF s/p Right TKA on 10/21/19 complicated by surgical site infection admitted to [facility] 11/22-11/26 s/p washout and on PICC antibiotics for MRSA (last day 1/6/2020) recently dc'd 12/16 for chf exacerbation presents after eval w Dr Estes yest for continued orthopedic care and monitoring for his ongoing wound infection.  Pt noted wound dehiscence and purulent drainage starting in Oct w cont infection; currently on rifampin (off for a bit 2/2 lft elevation but restarted ~ 1 wk ago) and vanomycin.  Pt notes cont pain similar to prior w/o sig change.  denies any fevers, nausea or vomiting. 64 M with PMHx of CAD, MI in 2018 s/p CABG and PCI (RCA stent with restenosis while on Plavix) on DAPT, DM II, DLD, Celiac, HFrEF s/p Right TKA on 10/21/19 complicated by surgical site infection admitted to [facility] 11/22-11/26 s/p washout and on PICC antibiotics for MRSA (last day 1/6/2020) recently dc'd from [facility] 12/16 for CHF exacerbation presents after kimberly Estes yesterday for continued orthopedic care and monitoring for his ongoing wound infection.  Aspiration in office revealed no evidence of purulence, but patient had difficulty complying with knee immobilization (repeatedly removing splints and braces) and elevation and wound not improving with marginal necrosis and superficial dehiscence.  Original infection found after wound dehiscence and purulent drainage in November; currently on rifampin (off for a bit 2/2 LFT elevation but restarted ~ 1 wk ago) and Vancomycin.  Pt notes cont pain similar to prior w/o sig change.  Denies any fevers, nausea or vomiting.

## 2019-12-21 NOTE — H&P ADULT - ATTENDING COMMENTS
63 yo male with marginal wound necrosis s/p I&D with poly exchange for acute wound infection s/p TKR.  Previous cultures positive for MRSA.  No clear evidence of uncontrolled deep infection, but cannot rule-out yet.   Admit to improve compliance with elevation and immobilization and closely observe response to treatment.  Possible need for surgical intervention to facilitate wound healing and/or infection clearance discussed with patient and family.  Possibility that skin healing could be achieved but infection could subsequently recur also discussed.  Risk that infection cannot be cleared or that additional, potentially severe (life or limb-threatening) medical and surgical complications ensue related to infection and/or its treatments also discussed.

## 2019-12-22 DIAGNOSIS — T81.49XA INFECTION FOLLOWING A PROCEDURE, OTHER SURGICAL SITE, INITIAL ENCOUNTER: ICD-10-CM

## 2019-12-22 LAB
ANION GAP SERPL CALC-SCNC: 8 MMOL/L — SIGNIFICANT CHANGE UP (ref 5–17)
BUN SERPL-MCNC: 21 MG/DL — SIGNIFICANT CHANGE UP (ref 7–23)
CALCIUM SERPL-MCNC: 8.5 MG/DL — SIGNIFICANT CHANGE UP (ref 8.4–10.5)
CHLORIDE SERPL-SCNC: 102 MMOL/L — SIGNIFICANT CHANGE UP (ref 96–108)
CO2 SERPL-SCNC: 25 MMOL/L — SIGNIFICANT CHANGE UP (ref 22–31)
CREAT SERPL-MCNC: 1.07 MG/DL — SIGNIFICANT CHANGE UP (ref 0.5–1.3)
GLUCOSE BLDC GLUCOMTR-MCNC: 155 MG/DL — HIGH (ref 70–99)
GLUCOSE BLDC GLUCOMTR-MCNC: 87 MG/DL — SIGNIFICANT CHANGE UP (ref 70–99)
GLUCOSE SERPL-MCNC: 155 MG/DL — HIGH (ref 70–99)
HCT VFR BLD CALC: 34.3 % — LOW (ref 39–50)
HGB BLD-MCNC: 10.6 G/DL — LOW (ref 13–17)
MCHC RBC-ENTMCNC: 24.2 PG — LOW (ref 27–34)
MCHC RBC-ENTMCNC: 30.9 GM/DL — LOW (ref 32–36)
MCV RBC AUTO: 78.3 FL — LOW (ref 80–100)
NRBC # BLD: 0 /100 WBCS — SIGNIFICANT CHANGE UP (ref 0–0)
PLATELET # BLD AUTO: 274 K/UL — SIGNIFICANT CHANGE UP (ref 150–400)
POTASSIUM SERPL-MCNC: 4.3 MMOL/L — SIGNIFICANT CHANGE UP (ref 3.5–5.3)
POTASSIUM SERPL-SCNC: 4.3 MMOL/L — SIGNIFICANT CHANGE UP (ref 3.5–5.3)
RBC # BLD: 4.38 M/UL — SIGNIFICANT CHANGE UP (ref 4.2–5.8)
RBC # FLD: 18.6 % — HIGH (ref 10.3–14.5)
SODIUM SERPL-SCNC: 135 MMOL/L — SIGNIFICANT CHANGE UP (ref 135–145)
WBC # BLD: 8.74 K/UL — SIGNIFICANT CHANGE UP (ref 3.8–10.5)
WBC # FLD AUTO: 8.74 K/UL — SIGNIFICANT CHANGE UP (ref 3.8–10.5)

## 2019-12-22 RX ADMIN — OXYCODONE HYDROCHLORIDE 10 MILLIGRAM(S): 5 TABLET ORAL at 02:00

## 2019-12-22 RX ADMIN — DULOXETINE HYDROCHLORIDE 90 MILLIGRAM(S): 30 CAPSULE, DELAYED RELEASE ORAL at 10:21

## 2019-12-22 RX ADMIN — PANTOPRAZOLE SODIUM 40 MILLIGRAM(S): 20 TABLET, DELAYED RELEASE ORAL at 06:27

## 2019-12-22 RX ADMIN — Medication 250 MILLIGRAM(S): at 18:16

## 2019-12-22 RX ADMIN — ATORVASTATIN CALCIUM 80 MILLIGRAM(S): 80 TABLET, FILM COATED ORAL at 20:50

## 2019-12-22 RX ADMIN — Medication 100 MILLIGRAM(S): at 06:30

## 2019-12-22 RX ADMIN — Medication 1 MILLIGRAM(S): at 04:38

## 2019-12-22 RX ADMIN — Medication 40 MILLIGRAM(S): at 06:28

## 2019-12-22 RX ADMIN — OXYCODONE HYDROCHLORIDE 10 MILLIGRAM(S): 5 TABLET ORAL at 01:00

## 2019-12-22 RX ADMIN — Medication 250 MILLIGRAM(S): at 06:29

## 2019-12-22 RX ADMIN — Medication 0.5 MILLIGRAM(S): at 21:50

## 2019-12-22 RX ADMIN — Medication 0.12 MILLIGRAM(S): at 06:26

## 2019-12-22 NOTE — PROVIDER CONTACT NOTE (OTHER) - SITUATION
Pt arrived to unit w/ insulin pump disconnected. Pt stated he manages his insulin better w/ his pump and wants to administer his own insulin. He also wanted his PICC line to be flushed w/ heparin.

## 2019-12-22 NOTE — CONSULT NOTE ADULT - ASSESSMENT
A/P 64y Male with hx of CAD, PCI/stent one year ago, DM well controlled , admitted for R knee surgical wound infection, s/p TKR 10/21, s/p I&D, Poly exchange , OR culture (+) MRSA onn 11/22, on IV Vancomycin, readmitted for Wound dehiscence

## 2019-12-22 NOTE — CONSULT NOTE ADULT - ASSESSMENT
64 M with PMHx of CAD, MI in 2018 s/p CABG and PCI (RCA stent with restenosis while on Plavix) on DAPT, DM II, DLD, Celiac, HFrEF s/p Right TKA on 10/21/19 complicated by surgical site infection admitted to [facility] 11/22-11/26 s/p washout and on PICC antibiotics for MRSA (last day 1/6/2020) recently dc'd 12/16 for chf exacerbation presents after eval w Dr Estes yest for continued orthopedic care and monitoring for his ongoing wound infection.  Pt noted wound dehiscence and purulent drainage starting in Oct w cont infection; currently on rifampin (off for a bit 2/2 lft elevation but restarted ~ 1 wk ago) and vanomycin. Cultures sent from office. No imaging. Currently with no active drainage but + erythema. No OR plan at this time per ortho.    - Collagenase to eschar  - Minimize pressure from splint on incision and surrounding skin  - Strict extension  - f/u cultures  - will d/w Dr. Lerman 64 M with PMHx of CAD, MI in 2018 s/p CABG and PCI (RCA stent with restenosis while on Plavix) on DAPT, DM II, DLD, Celiac, HFrEF s/p Right TKA on 10/21/19 complicated by surgical site infection admitted to [facility] 11/22-11/26 s/p washout and on PICC antibiotics for MRSA (last day 1/6/2020) recently dc'd 12/16 for chf exacerbation presents after eval w Dr Estes yest for continued orthopedic care and monitoring for his ongoing wound infection.  Pt noted wound dehiscence and purulent drainage starting in Oct w cont infection; currently on rifampin (off for a bit 2/2 lft elevation but restarted ~ 1 wk ago) and vanomycin. Cultures sent from office. No imaging. Currently with no active drainage but + erythema. No OR plan at this time per ortho.    - Recommend wound care consult   - Minimize pressure from splint on incision and surrounding skin  - Strict extension  - f/u cultures  - d/w Dr. Lerman  - Dr. Lerman away this week. If additional intervention required, please call plastic surgery attending on call.

## 2019-12-22 NOTE — CONSULT NOTE ADULT - PROBLEM SELECTOR RECOMMENDATION 9
1) Continue IV Vancomycin 1gr q12h and Rifampin 300mg PO q12h ; recheck Vancomycin trough in AM( was 15-16 in 12/2019)  2) Check ESR, CRP in AM	  3) Plastic and Ortho surgery f/u

## 2019-12-22 NOTE — CONSULT NOTE ADULT - SUBJECTIVE AND OBJECTIVE BOX
History of Present Illness:  Reason for Admission: wound necrosis s/p TKA	  History of Present Illness: 	  64 M with PMHx of CAD, MI in 2018 s/p CABG and PCI (RCA stent with restenosis while on Plavix) on DAPT, DM II, DLD, Celiac, HFrEF s/p Right TKA on 10/21/19 complicated by surgical site infection admitted to [facility] - s/p washout and on PICC antibiotics for MRSA (last day 2020) recently dc'd  for chf exacerbation presents after eval w Dr Estes yest for continued orthopedic care and monitoring for his ongoing wound infection.  Pt noted wound dehiscence and purulent drainage starting in Oct w cont infection; currently on rifampin (off for a bit 2/2 lft elevation but restarted ~ 1 wk ago) and vanomycin.  Pt notes cont pain similar to prior w/o sig change.  denies any fevers, nausea or vomiting.     Review of Systems:  Review of Systems: as above	      Allergies and Intolerances:        Allergies:  	enalapril: Drug, Hives  	ACE inhibitors: Drug Category, Hives, Originally Entered as [Unknown] reaction to [ACE INHIBITORS]    Home Medications:   * Patient Currently Takes Medications as of 16-Dec-2019 16:06 documented in Structured Notes  · 	digoxin 125 mcg (0.125 mg) oral tablet: 1 tab(s) orally once a day  · 	furosemide 40 mg oral tablet: 1 tab(s) orally once a day  · 	metOLazone 2.5 mg oral tablet: 1 tab(s) orally once a day, take 30 minutes before Lasix  · 	ondansetron 4 mg oral tablet, disintegratin tab(s) orally 3 times a day -for nausea   · 	aspirin 81 mg oral delayed release tablet: 1 tab(s) orally 2 times a day. CONTINUE UNTIL 1 MONTH AFTER SURGERY (). THEN DECREASE DOSE BACK TO ONCE DAILY.  · 	rifAMPin 150 mg oral capsule: 2 cap(s) orally every 12 hours for 6 weeks  · 	vancomycin 1g in NS 250ml every 12 hours : Last date of administration: 1/3/20  	  	diagnosis: T84.53XA  · 	pantoprazole 40 mg oral delayed release tablet: 1 tab(s) orally once a day (before a meal)  · 	senna oral tablet: 2 tab(s) orally once a day (at bedtime), As needed, Constipation  · 	polyethylene glycol 3350 oral powder for reconstitution: 17 gram(s) orally once a day  · 	acetaminophen 325 mg oral tablet: 2 tab(s) orally every 6 hours  · 	prasugrel 10 mg oral tablet: 1 tab(s) orally once a day  · 	Zetia 10 mg oral tablet: 1 tab(s) orally once a day  · 	Cymbalta: 90 milligram(s) orally once a day  · 	Jardiance 10 mg oral tablet: 1 tab(s) orally once a day (in the morning)  · 	Metoprolol Succinate  mg oral tablet, extended release: 1 tab(s) orally once a day  · 	Crestor 40 mg oral tablet: 1 tab(s) orally once a day  · 	Trulicity Pen 1.5 mg/0.5 mL subcutaneous solution:   · 	oxycodone-acetaminophen 5 mg-325 mg oral tablet: 1-2 tab(s) orally every 4-6 hours, as needed for moderate to severe pain    Patient History:   Past Medical, Past Surgical, and Family History:  PAST MEDICAL HISTORY:  Atherosclerosis of coronary artery CAD (coronary artery disease)    Blood clot due to device, implant, or graft was on blood thinners    CHF (congestive heart failure)     Chronic systolic CHF (congestive heart failure)     Diabetes     History of celiac disease     HLD (hyperlipidemia)     HTN (hypertension)     Osteoarthritis     Status post percutaneous transluminal coronary angioplasty in .     PAST SURGICAL HISTORY:  Other postprocedural status Fixation hardware in foot LEFT    S/P CABG x 1 2018    S/P TKR (total knee replacement), right     Stented coronary artery 10/18 heart attack  INFERIOR WALL MI.     FAMILY HISTORY:  Mother  Still living? No  Family history of heart disease, Age at diagnosis: Age Unknown  Family history of heart disease, Age at diagnosis: Age Unknown.    Tobacco Screening:  · Core Measure Site	No	  · Has the patient used tobacco in the past 30 days?	No	    Risk Assessment:   Present on Admission:  Deep Venous Thrombosis	no	  Pulmonary Embolus	no	    Heart Failure:  Does this patient have a history of or has been diagnosed with heart failure? yes.     LV Function Assessment (LVS function was evaluated before arrival and/or during hospitalization) unknown.    HIV Screen (per [facility] Department of Health, HIV screening must be offered to every individual between ages 13 and 64)	Offered and patient declined	      Physical Exam:  Physical Exam: general: well appearing, In nad  RLE: Splint in place Dehiscence anterior knee incision with dry eschar  No expressible pus or palpable collection Vero-incisional erythema 	  Labs:                        10.6   8.74  )-----------( 274      ( 22 Dec 2019 08:03 )             34.3

## 2019-12-22 NOTE — PROGRESS NOTE ADULT - SUBJECTIVE AND OBJECTIVE BOX
Ortho Note    Pt comfortable without complaints, pain controlled  Denies CP, SOB, N/V, numbness/tingling     Vital Signs Last 24 Hrs  T(C): 36.2 (21 Dec 2019 20:33), Max: 36.8 (21 Dec 2019 15:29)  T(F): 97.2 (21 Dec 2019 20:33), Max: 98.2 (21 Dec 2019 15:29)  HR: 88 (21 Dec 2019 20:33) (72 - 88)  BP: 116/71 (21 Dec 2019 20:33) (94/55 - 116/71)  BP(mean): --  RR: 18 (21 Dec 2019 20:33) (16 - 18)  SpO2: 98% (21 Dec 2019 20:33) (98% - 100%)      General: Pt Alert and oriented, NAD  In long leg splint w window for wound check at anterior knee  wet to dry dressing over anterior knee wound  Pulses: cap refill < 3 secs on toes  Sensation: SILT across toes  Motor: firing EHL/FHL                          10.8   8.46  )-----------( 285      ( 21 Dec 2019 14:23 )             36.1   21 Dec 2019 15:54    140    |  103    |  19     ----------------------------<  167    4.4     |  27     |  0.97       TPro  7.3    /  Alb  3.6    /  TBili  0.8    /  DBili  x      /  AST  19     /  ALT  25     /  AlkPhos  77     21 Dec 2019 15:54      64 M w necrosis of incision s/p TKA  - NWB RLE, Long leg splint in extension  - Strict elevation, need to check pt several times throughout day for compliance, pt likes to keep knee bent, has bent splint.   - wet to dry dressing changes over incisions  - Cardio consult, Dr. Lilia Caldera  - Plastics consulted - appreciate recs  - ID - Dr. Nix   - pain control  - PT    Ortho Pager [phone]

## 2019-12-22 NOTE — CONSULT NOTE ADULT - SUBJECTIVE AND OBJECTIVE BOX
HPI:  64 M with PMHx of CAD, MI in 2018 s/p CABG and PCI (RCA stent with restenosis while on Plavix) on DAPT, DM II, DLD, Celiac, HFrEF s/p Right TKA on 10/21/19 complicated by surgical site infection admitted to [facility] 11/22-11/26 s/p washout and on PICC antibiotics for MRSA (last day 1/6/2020) recently dc'd 12/16 for chf exacerbation presents after eval w Dr Estes yest for continued orthopedic care and monitoring for his ongoing wound infection.  Pt noted wound dehiscence and purulent drainage starting in Oct w cont infection; currently on rifampin (off for a bit 2/2 lft elevation but restarted ~ 1 wk ago) and vanomycin.  Pt notes cont pain similar to prior w/o sig change.  denies any fevers, nausea or vomiting. (21 Dec 2019 17:22)  Restarted on IV Vancomycin 1gr q12h and Rifampin 300mg q12h      PAST MEDICAL & SURGICAL HISTORY:  History of celiac disease  CHF (congestive heart failure)  HTN (hypertension)  Diabetes  Blood clot due to device, implant, or graft: was on blood thinners  HLD (hyperlipidemia)  Chronic systolic CHF (congestive heart failure)  Osteoarthritis  Atherosclerosis of coronary artery: CAD (coronary artery disease)  Status post percutaneous transluminal coronary angioplasty: in 2012  S/P TKR (total knee replacement), right  S/P CABG x 1: 2018  Stented coronary artery: 10/18 heart attack  INFERIOR WALL MI  Other postprocedural status: Fixation hardware in foot LEFT        REVIEW OF SYSTEMS:    General:  no weakness; no fevers, no chills  Skin/Breast: no rash  Respiratory and Thorax: no SOB, no cough  Cardiovascular:	No chest pain  Gastrointestinal:	 no nausea, vomiting , diarrhea  Genitourinary:	no dysuria, no difficulty urinating, no hematuria  Musculoskeletal:	no weakness, no joint swelling/pain, R knee wound with eschar/ dehiscence  Neurological:	no focal weakness/numbness  Endocrine: no polyuria, no polydipsia      ANTIBIOTICS:  MEDICATIONS  (STANDING):  atorvastatin 80 milliGRAM(s) Oral at bedtime  dextrose 5%. 1000 milliLiter(s) (50 mL/Hr) IV Continuous <Continuous>  dextrose 50% Injectable 12.5 Gram(s) IV Push once  dextrose 50% Injectable 25 Gram(s) IV Push once  dextrose 50% Injectable 25 Gram(s) IV Push once  digoxin     Tablet 0.125 milliGRAM(s) Oral daily  DULoxetine 90 milliGRAM(s) Oral daily  furosemide    Tablet 40 milliGRAM(s) Oral daily  metolazone 2.5 milliGRAM(s) Oral daily  metoprolol succinate  milliGRAM(s) Oral daily  pantoprazole    Tablet 40 milliGRAM(s) Oral before breakfast  polyethylene glycol 3350 17 Gram(s) Oral daily  rifAMPin 300 milliGRAM(s) Oral two times a day  vancomycin  IVPB 1000 milliGRAM(s) IV Intermittent every 12 hours    MEDICATIONS  (PRN):  acetaminophen   Tablet .. 650 milliGRAM(s) Oral every 6 hours PRN Temp greater or equal to 38C (100.4F), Mild Pain (1 - 3)  aluminum hydroxide/magnesium hydroxide/simethicone Suspension 30 milliLiter(s) Oral four times a day PRN Indigestion  dextrose 40% Gel 15 Gram(s) Oral once PRN Blood Glucose LESS THAN 70 milliGRAM(s)/deciliter  glucagon  Injectable 1 milliGRAM(s) IntraMuscular once PRN Glucose LESS THAN 70 milligrams/deciliter  HYDROmorphone  Injectable 0.5 milliGRAM(s) IV Push every 4 hours PRN Severe Pain (7 - 10)  magnesium hydroxide Suspension 30 milliLiter(s) Oral daily PRN Constipation  ondansetron Injectable 4 milliGRAM(s) IV Push every 6 hours PRN Nausea and/or Vomiting  oxyCODONE    IR 5 milliGRAM(s) Oral every 4 hours PRN Moderate Pain (4 - 6)  oxyCODONE    IR 10 milliGRAM(s) Oral every 4 hours PRN Severe Pain (7 - 10)  senna 2 Tablet(s) Oral at bedtime PRN Constipation      Allergies: ACE inhibitors (Hives), enalapril (Hives)      SOCIAL HISTORY: no ETOH, no smoking    FAMILY HISTORY:  Family history of heart disease (Mother)  Family history of heart disease (Mother)      Vital Signs Last 24 Hrs  T(C): 36.5 (22 Dec 2019 16:10), Max: 36.9 (22 Dec 2019 09:30)  T(F): 97.7 (22 Dec 2019 16:10), Max: 98.4 (22 Dec 2019 09:30)  HR: 89 (22 Dec 2019 16:10) (86 - 92)  BP: 113/70 (22 Dec 2019 16:10) (112/71 - 138/68)  BP(mean): --  RR: 18 (22 Dec 2019 16:10) (18 - 18)  SpO2: 94% (22 Dec 2019 16:10) (94% - 99%)    12-21-19 @ 07:01  -  12-22-19 @ 07:00  --------------------------------------------------------  IN: 0 mL / OUT: 1200 mL / NET: -1200 mL    12-22-19 @ 07:01  -  12-22-19 @ 19:53  --------------------------------------------------------  IN: 0 mL / OUT: 1000 mL / NET: -1000 mL        PHYSICAL EXAM:  Constitutional:Well-developed, well nourished  Eyes:DAMARIS, EOMI  Ear/Nose/Throat: no oral lesion, no sinus tenderness on percussion	  Neck:no JVD, no lymphadenopathy, supple  Respiratory: CTA colton  Cardiovascular: S1S2 RRR, no murmurs  Gastrointestinal:soft, (+) BS, no HSM  Extremities: R knee  incision necrosis of TKA incision noted midline w some purulence and erythema   	no active drainage   Pulses: +2DP, WWP feet  Motor: 5/5 EHL/FHL/TA/GS        LABS:  Sedimentation Rate, Erythrocyte (12.03.19 @ 20:01)    Sedimentation Rate, Erythrocyte: 15 mm/Hr    C-Reactive Protein, Serum (12.03.19 @ 20:01)    C-Reactive Protein, Serum: 4.85 mg/dL                            10.6   8.74  )-----------( 274      ( 22 Dec 2019 08:03 )             34.3     12-22    135  |  102  |  21  ----------------------------<  155<H>  4.3   |  25  |  1.07    Ca    8.5      22 Dec 2019 08:03    TPro  7.3  /  Alb  3.6  /  TBili  0.8  /  DBili  x   /  AST  19  /  ALT  25  /  AlkPhos  77  12-21      MICROBIOLOGY:  Culture - Blood (12.21.19 @ 23:15)    Specimen Source: .Blood Blood-Venous    Culture Results:   No growth at 12 hours  Culture - Surgical Swab (11.22.19 @ 19:16)    -  Linezolid: S 2    -  Oxacillin: R >2    -  Penicillin: R >8    -  RIF- Rifampin: S <=1 Should not be used as monotherapy    -  Tetra/Doxy: S <=1    -  Trimethoprim/Sulfamethoxazole: R >2/38    -  Vancomycin: S 1    -  Vancomycin: S 1.5    Gram Stain:   No organisms seen  Rare WBC's    -  Cefazolin: R 8    -  Clindamycin: S <=0.25    -  Daptomycin: S 0.5    -  Erythromycin: R >4    Specimen Source: .Surgical Swab Right knee superficial    Culture Results:   Few Methicillin resistant Staphylococcus aureus  Floor previously notified.    Organism Identification: Methicillin resistant Staphylococcus aureus  Methicillin resistant Staphylococcus aureus    Organism: Methicillin resistant Staphylococcus aureus    RADIOLOGY & ADDITIONAL STUDIES:

## 2019-12-23 LAB
ANION GAP SERPL CALC-SCNC: 13 MMOL/L — SIGNIFICANT CHANGE UP (ref 5–17)
BUN SERPL-MCNC: 30 MG/DL — HIGH (ref 7–23)
CALCIUM SERPL-MCNC: 8.6 MG/DL — SIGNIFICANT CHANGE UP (ref 8.4–10.5)
CHLORIDE SERPL-SCNC: 98 MMOL/L — SIGNIFICANT CHANGE UP (ref 96–108)
CO2 SERPL-SCNC: 22 MMOL/L — SIGNIFICANT CHANGE UP (ref 22–31)
CREAT SERPL-MCNC: 0.98 MG/DL — SIGNIFICANT CHANGE UP (ref 0.5–1.3)
CRP SERPL-MCNC: 4.51 MG/DL — HIGH (ref 0–0.4)
ERYTHROCYTE [SEDIMENTATION RATE] IN BLOOD: 30 MM/HR — HIGH
GLUCOSE SERPL-MCNC: 242 MG/DL — HIGH (ref 70–99)
HCT VFR BLD CALC: 37.5 % — LOW (ref 39–50)
HGB BLD-MCNC: 11.2 G/DL — LOW (ref 13–17)
MCHC RBC-ENTMCNC: 23.7 PG — LOW (ref 27–34)
MCHC RBC-ENTMCNC: 29.9 GM/DL — LOW (ref 32–36)
MCV RBC AUTO: 79.3 FL — LOW (ref 80–100)
NRBC # BLD: 0 /100 WBCS — SIGNIFICANT CHANGE UP (ref 0–0)
PLATELET # BLD AUTO: 308 K/UL — SIGNIFICANT CHANGE UP (ref 150–400)
POTASSIUM SERPL-MCNC: 4.1 MMOL/L — SIGNIFICANT CHANGE UP (ref 3.5–5.3)
POTASSIUM SERPL-SCNC: 4.1 MMOL/L — SIGNIFICANT CHANGE UP (ref 3.5–5.3)
RBC # BLD: 4.73 M/UL — SIGNIFICANT CHANGE UP (ref 4.2–5.8)
RBC # FLD: 18.3 % — HIGH (ref 10.3–14.5)
SODIUM SERPL-SCNC: 133 MMOL/L — LOW (ref 135–145)
VANCOMYCIN TROUGH SERPL-MCNC: 17.8 UG/ML — SIGNIFICANT CHANGE UP (ref 10–20)
WBC # BLD: 8.98 K/UL — SIGNIFICANT CHANGE UP (ref 3.8–10.5)
WBC # FLD AUTO: 8.98 K/UL — SIGNIFICANT CHANGE UP (ref 3.8–10.5)

## 2019-12-23 RX ORDER — COLLAGENASE CLOSTRIDIUM HIST. 250 UNIT/G
1 OINTMENT (GRAM) TOPICAL DAILY
Refills: 0 | Status: DISCONTINUED | OUTPATIENT
Start: 2019-12-23 | End: 2019-12-27

## 2019-12-23 RX ORDER — ENOXAPARIN SODIUM 100 MG/ML
40 INJECTION SUBCUTANEOUS DAILY
Refills: 0 | Status: DISCONTINUED | OUTPATIENT
Start: 2019-12-23 | End: 2019-12-26

## 2019-12-23 RX ORDER — SODIUM CHLORIDE 9 MG/ML
10 INJECTION INTRAMUSCULAR; INTRAVENOUS; SUBCUTANEOUS
Refills: 0 | Status: DISCONTINUED | OUTPATIENT
Start: 2019-12-23 | End: 2019-12-27

## 2019-12-23 RX ORDER — CHLORHEXIDINE GLUCONATE 213 G/1000ML
1 SOLUTION TOPICAL
Refills: 0 | Status: DISCONTINUED | OUTPATIENT
Start: 2019-12-23 | End: 2019-12-27

## 2019-12-23 RX ADMIN — Medication 40 MILLIGRAM(S): at 05:53

## 2019-12-23 RX ADMIN — Medication 250 MILLIGRAM(S): at 19:46

## 2019-12-23 RX ADMIN — Medication 0.12 MILLIGRAM(S): at 05:54

## 2019-12-23 RX ADMIN — PANTOPRAZOLE SODIUM 40 MILLIGRAM(S): 20 TABLET, DELAYED RELEASE ORAL at 05:53

## 2019-12-23 RX ADMIN — Medication 1 APPLICATION(S): at 15:10

## 2019-12-23 RX ADMIN — Medication 1 MILLIGRAM(S): at 23:09

## 2019-12-23 RX ADMIN — Medication 100 MILLIGRAM(S): at 05:53

## 2019-12-23 RX ADMIN — Medication 250 MILLIGRAM(S): at 08:53

## 2019-12-23 RX ADMIN — ENOXAPARIN SODIUM 40 MILLIGRAM(S): 100 INJECTION SUBCUTANEOUS at 17:46

## 2019-12-23 RX ADMIN — ATORVASTATIN CALCIUM 80 MILLIGRAM(S): 80 TABLET, FILM COATED ORAL at 20:54

## 2019-12-23 RX ADMIN — DULOXETINE HYDROCHLORIDE 90 MILLIGRAM(S): 30 CAPSULE, DELAYED RELEASE ORAL at 13:34

## 2019-12-23 RX ADMIN — CHLORHEXIDINE GLUCONATE 1 APPLICATION(S): 213 SOLUTION TOPICAL at 06:46

## 2019-12-23 NOTE — PROGRESS NOTE ADULT - SUBJECTIVE AND OBJECTIVE BOX
Orthopaedic Surgery Progress Note    64M s/p Right TKR on 9/2019 complicated by +MRSA infection (s/p washout/PICC line placement in 11/2019), readmitted for continued wound infaction    Subjective:     Patient seen and examined with PGY-2 Igor Rodriguez at bedside for cast application. Patient comfortable without complaints, pain controlled.      Objective:    Vital Signs Last 24 Hrs  T(C): 36.2 (12-23-19 @ 09:47), Max: 36.7 (12-23-19 @ 08:50)  T(F): 97.1 (12-23-19 @ 09:47), Max: 98 (12-23-19 @ 08:50)  HR: 88 (12-23-19 @ 09:47) (88 - 93)  BP: 121/72 (12-23-19 @ 09:47) (121/70 - 121/72)  BP(mean): --  RR: 16 (12-23-19 @ 09:47) (16 - 16)  SpO2: 99% (12-23-19 @ 09:47) (96% - 99%)  AVSS    PE:  General: Patient alert and oriented, NAD  New cast applied to RLE  Skin warm and well perfused  Patient moving right foot   Tolerated case application well                           11.2   8.98  )-----------( 308      ( 23 Dec 2019 06:53 )             37.5   23 Dec 2019 06:53    133    |  98     |  30     ----------------------------<  242    4.1     |  22     |  0.98     Ca    8.6        23 Dec 2019 06:53    TPro  7.3    /  Alb  3.6    /  TBili  0.8    /  DBili  x      /  AST  19     /  ALT  25     /  AlkPhos  77     21 Dec 2019 15:54      A/P: 64M s/p Right TKR on 9/2019 complicated by +MRSA infection (s/p washout/PICC line placement in 11/2019), readmitted for continued wound infection  1. Pain control as needed  2. DVT prophylaxis: SCDs  3. PT, weight-bearing status:  NWB RLE, long leg splint with window for wound applied   4. Dispo: pending  5. Plastics following - appreciate recommendations for daily wound care with collagenase/WTD  6. Infectious disease following - appreciate recommendations;  continue rifampin/vancomycin - per ID, vanco trough ordered this AM (17.8). Per ID, ESR/CRP ordered     Ortho Pager [phone] Orthopaedic Surgery Progress Note    64M s/p Right TKR on 9/2019 complicated by +MRSA infection (s/p washout/PICC line placement in 11/2019), readmitted for continued wound infaction    Subjective:     Patient seen and examined with PGY-2 Igor Rodriguez at bedside for cast application. Patient comfortable without complaints, pain controlled.      Objective:    Vital Signs Last 24 Hrs  T(C): 36.2 (12-23-19 @ 09:47), Max: 36.7 (12-23-19 @ 08:50)  T(F): 97.1 (12-23-19 @ 09:47), Max: 98 (12-23-19 @ 08:50)  HR: 88 (12-23-19 @ 09:47) (88 - 93)  BP: 121/72 (12-23-19 @ 09:47) (121/70 - 121/72)  BP(mean): --  RR: 16 (12-23-19 @ 09:47) (16 - 16)  SpO2: 99% (12-23-19 @ 09:47) (96% - 99%)  AVSS    PE:  General: Patient alert and oriented, NAD  New cast applied to RLE  Skin warm and well perfused  Patient moving right foot   Tolerated case application well                           11.2   8.98  )-----------( 308      ( 23 Dec 2019 06:53 )             37.5   23 Dec 2019 06:53    133    |  98     |  30     ----------------------------<  242    4.1     |  22     |  0.98     Ca    8.6        23 Dec 2019 06:53    TPro  7.3    /  Alb  3.6    /  TBili  0.8    /  DBili  x      /  AST  19     /  ALT  25     /  AlkPhos  77     21 Dec 2019 15:54      A/P: 64M s/p Right TKR on 9/2019 complicated by +MRSA infection (s/p washout/PICC line placement in 11/2019), readmitted for continued wound infection  1. Pain control as needed  2. DVT prophylaxis: SCDs  3. PT, weight-bearing status:  NWB RLE, long leg splint with window for wound applied   4. Dispo: pending  5. Plastics following - appreciate recommendations for daily wound care with collagenase/WTD  6. Infectious disease following - appreciate recommendations;  continue rifampin/vancomycin - per ID, vanco trough ordered this AM (17.8). Per ID, ESR/CRP ordered     Ortho Pager [phone]

## 2019-12-24 LAB
ANION GAP SERPL CALC-SCNC: 17 MMOL/L — SIGNIFICANT CHANGE UP (ref 5–17)
BUN SERPL-MCNC: 33 MG/DL — HIGH (ref 7–23)
CALCIUM SERPL-MCNC: 8.6 MG/DL — SIGNIFICANT CHANGE UP (ref 8.4–10.5)
CHLORIDE SERPL-SCNC: 98 MMOL/L — SIGNIFICANT CHANGE UP (ref 96–108)
CO2 SERPL-SCNC: 22 MMOL/L — SIGNIFICANT CHANGE UP (ref 22–31)
CREAT SERPL-MCNC: 1.12 MG/DL — SIGNIFICANT CHANGE UP (ref 0.5–1.3)
CRP SERPL-MCNC: 4.71 MG/DL — HIGH (ref 0–0.4)
DIGOXIN SERPL-MCNC: 0.6 NG/ML — LOW (ref 0.8–2)
ERYTHROCYTE [SEDIMENTATION RATE] IN BLOOD: 12 MM/HR — SIGNIFICANT CHANGE UP
GLUCOSE BLDC GLUCOMTR-MCNC: 144 MG/DL — HIGH (ref 70–99)
GLUCOSE BLDC GLUCOMTR-MCNC: 91 MG/DL — SIGNIFICANT CHANGE UP (ref 70–99)
GLUCOSE SERPL-MCNC: 172 MG/DL — HIGH (ref 70–99)
HCT VFR BLD CALC: 36.4 % — LOW (ref 39–50)
HGB BLD-MCNC: 10.8 G/DL — LOW (ref 13–17)
MCHC RBC-ENTMCNC: 23.4 PG — LOW (ref 27–34)
MCHC RBC-ENTMCNC: 29.7 GM/DL — LOW (ref 32–36)
MCV RBC AUTO: 79 FL — LOW (ref 80–100)
NRBC # BLD: 0 /100 WBCS — SIGNIFICANT CHANGE UP (ref 0–0)
PLATELET # BLD AUTO: 319 K/UL — SIGNIFICANT CHANGE UP (ref 150–400)
POTASSIUM SERPL-MCNC: 3.8 MMOL/L — SIGNIFICANT CHANGE UP (ref 3.5–5.3)
POTASSIUM SERPL-SCNC: 3.8 MMOL/L — SIGNIFICANT CHANGE UP (ref 3.5–5.3)
RBC # BLD: 4.61 M/UL — SIGNIFICANT CHANGE UP (ref 4.2–5.8)
RBC # FLD: 18.4 % — HIGH (ref 10.3–14.5)
SODIUM SERPL-SCNC: 137 MMOL/L — SIGNIFICANT CHANGE UP (ref 135–145)
VANCOMYCIN TROUGH SERPL-MCNC: 22.4 UG/ML — HIGH (ref 10–20)
WBC # BLD: 7.98 K/UL — SIGNIFICANT CHANGE UP (ref 3.8–10.5)
WBC # FLD AUTO: 7.98 K/UL — SIGNIFICANT CHANGE UP (ref 3.8–10.5)

## 2019-12-24 PROCEDURE — 99222 1ST HOSP IP/OBS MODERATE 55: CPT | Mod: GC

## 2019-12-24 RX ORDER — INSULIN LISPRO 100/ML
1 VIAL (ML) SUBCUTANEOUS
Refills: 0 | Status: DISCONTINUED | OUTPATIENT
Start: 2019-12-24 | End: 2019-12-27

## 2019-12-24 RX ORDER — VANCOMYCIN HCL 1 G
750 VIAL (EA) INTRAVENOUS EVERY 12 HOURS
Refills: 0 | Status: DISCONTINUED | OUTPATIENT
Start: 2019-12-25 | End: 2019-12-27

## 2019-12-24 RX ORDER — ONDANSETRON 8 MG/1
4 TABLET, FILM COATED ORAL EVERY 4 HOURS
Refills: 0 | Status: DISCONTINUED | OUTPATIENT
Start: 2019-12-24 | End: 2019-12-27

## 2019-12-24 RX ORDER — ROSUVASTATIN CALCIUM 5 MG/1
40 TABLET ORAL AT BEDTIME
Refills: 0 | Status: DISCONTINUED | OUTPATIENT
Start: 2019-12-24 | End: 2019-12-27

## 2019-12-24 RX ADMIN — OXYCODONE HYDROCHLORIDE 10 MILLIGRAM(S): 5 TABLET ORAL at 23:20

## 2019-12-24 RX ADMIN — ENOXAPARIN SODIUM 40 MILLIGRAM(S): 100 INJECTION SUBCUTANEOUS at 11:02

## 2019-12-24 RX ADMIN — OXYCODONE HYDROCHLORIDE 10 MILLIGRAM(S): 5 TABLET ORAL at 18:50

## 2019-12-24 RX ADMIN — Medication 40 MILLIGRAM(S): at 06:05

## 2019-12-24 RX ADMIN — POLYETHYLENE GLYCOL 3350 17 GRAM(S): 17 POWDER, FOR SOLUTION ORAL at 11:02

## 2019-12-24 RX ADMIN — OXYCODONE HYDROCHLORIDE 10 MILLIGRAM(S): 5 TABLET ORAL at 22:40

## 2019-12-24 RX ADMIN — ONDANSETRON 4 MILLIGRAM(S): 8 TABLET, FILM COATED ORAL at 17:50

## 2019-12-24 RX ADMIN — CHLORHEXIDINE GLUCONATE 1 APPLICATION(S): 213 SOLUTION TOPICAL at 05:29

## 2019-12-24 RX ADMIN — Medication 100 MILLIGRAM(S): at 06:04

## 2019-12-24 RX ADMIN — OXYCODONE HYDROCHLORIDE 10 MILLIGRAM(S): 5 TABLET ORAL at 09:21

## 2019-12-24 RX ADMIN — OXYCODONE HYDROCHLORIDE 10 MILLIGRAM(S): 5 TABLET ORAL at 14:12

## 2019-12-24 RX ADMIN — Medication 0.12 MILLIGRAM(S): at 06:04

## 2019-12-24 RX ADMIN — OXYCODONE HYDROCHLORIDE 10 MILLIGRAM(S): 5 TABLET ORAL at 09:50

## 2019-12-24 RX ADMIN — Medication 250 MILLIGRAM(S): at 06:30

## 2019-12-24 RX ADMIN — Medication 1 APPLICATION(S): at 09:18

## 2019-12-24 RX ADMIN — PANTOPRAZOLE SODIUM 40 MILLIGRAM(S): 20 TABLET, DELAYED RELEASE ORAL at 06:05

## 2019-12-24 RX ADMIN — ROSUVASTATIN CALCIUM 40 MILLIGRAM(S): 5 TABLET ORAL at 21:24

## 2019-12-24 RX ADMIN — OXYCODONE HYDROCHLORIDE 10 MILLIGRAM(S): 5 TABLET ORAL at 14:45

## 2019-12-24 RX ADMIN — DULOXETINE HYDROCHLORIDE 90 MILLIGRAM(S): 30 CAPSULE, DELAYED RELEASE ORAL at 11:02

## 2019-12-24 RX ADMIN — OXYCODONE HYDROCHLORIDE 10 MILLIGRAM(S): 5 TABLET ORAL at 18:21

## 2019-12-24 NOTE — PROGRESS NOTE ADULT - SUBJECTIVE AND OBJECTIVE BOX
Ortho Note    Pt comfortable without complaints, pain controlled.  Denies CP, SOB, N/V, numbness/tingling down le b/l     Vital Signs Last 24 Hrs  T(C): 36.3 (12-24-19 @ 08:32), Max: 36.3 (12-24-19 @ 08:32)  T(F): 97.4 (12-24-19 @ 08:32), Max: 97.4 (12-24-19 @ 08:32)  HR: 89 (12-24-19 @ 08:32) (89 - 89)  BP: 125/73 (12-24-19 @ 08:32) (125/73 - 125/73)  BP(mean): --  RR: 198 (12-24-19 @ 08:32) (198 - 198)  SpO2: 96% (12-24-19 @ 08:32) (96% - 96%)      General: Pt Alert and oriented, NAD  DSG long leg cast RLE C/D/I with gauze dressing C/D/I  Pulses:  wwp, brisk cp refill  Sensation:  SILT throughout distal le b/l and symmetrically  Motor: EHL/FHL/TA/GS 5/5 throughout le b/l                          10.8   7.98  )-----------( 319      ( 24 Dec 2019 06:33 )             36.4   24 Dec 2019 06:33    137    |  98     |  33     ----------------------------<  172    3.8     |  22     |  1.12           A/P: 64yMale s/p R TKA 9/2019 and R knee I&D with poly [id number] w 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  Denies CP, SOB, N/V, numbness/tingling down le b/l     Vital Signs Last 24 Hrs  T(C): 36.3 (12-24-19 @ 08:32), Max: 36.3 (12-24-19 @ 08:32)  T(F): 97.4 (12-24-19 @ 08:32), Max: 97.4 (12-24-19 @ 08:32)  HR: 89 (12-24-19 @ 08:32) (89 - 89)  BP: 125/73 (12-24-19 @ 08:32) (125/73 - 125/73)  BP(mean): --  RR: 198 (12-24-19 @ 08:32) (198 - 198)  SpO2: 96% (12-24-19 @ 08:32) (96% - 96%)      General: Pt Alert and oriented, NAD  DSG long leg cast RLE C/D/I with gauze dressing C/D/I  Pulses:  wwp, brisk cp refill  Sensation:  SILT throughout distal le b/l and symmetrically  Motor: EHL/FHL/TA/GS 5/5 throughout le b/l                          10.8   7.98  )-----------( 319      ( 24 Dec 2019 06:33 )             36.4   24 Dec 2019 06:33    137    |  98     |  33     ----------------------------<  172    3.8     |  22     |  1.12           A/P: 64yMale s/p R TKA 9/2019 and R knee I&D with poly [id number] w 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

## 2019-12-24 NOTE — PROGRESS NOTE ADULT - PROBLEM SELECTOR PLAN 1
1) Hold  IV Vancomycin 1gr tonight and contine 750md IV q12h in AM  2) Continue Rifampin 300mg PO q12h ; change Atorvastatin to Rosuvastatin , less interaction with Rifampin  3)ESR, CRP normalizing  4) Plastic and Ortho surgery f/u, wound care  5) Pending R knee aspirate culture from Dr Estes office on day of admission

## 2019-12-24 NOTE — CONSULT NOTE ADULT - SUBJECTIVE AND OBJECTIVE BOX
HPI: 64yMale    Age at Dx:  How dx:  Hx and duration of insulin:  Current Therapy:  Hx of hypoglycemia  Hx of DKA/HHS?    Home FSG:  Fasting  Lunch  Dinner  Bed    Hx of other regimens  Complications:  Outpatient Endo:    PMH & Surgical Hx:WOUNG INFECTION AFTERSURGERY  Family history of heart disease (Mother)  Family history of heart disease (Mother)  Handoff  MEWS Score  History of celiac disease  CHF (congestive heart failure)  HTN (hypertension)  Diabetes  Blood clot due to device, implant, or graft  CKD (chronic kidney disease) stage 2, GFR 60-89 ml/min  COPD, moderate  Hyperkalemia  HLD (hyperlipidemia)  Chronic systolic CHF (congestive heart failure)  Osteoarthritis  HTN (hypertension)  Type 2 diabetes mellitus with neurological manifestations  Type 2 diabetes mellitus  History of surgery to heart and great vessels, presenting hazards to health  Atherosclerosis of coronary artery  Status post percutaneous transluminal coronary angioplasty  CHF (congestive heart failure)  HTN (hypertension)  Wound infection after surgery  Wound infection after surgery  S/P TKR (total knee replacement), right  S/P CABG x 1  Stented coronary artery  History of surgery to major organs, presenting hazards to health  Other postprocedural status  POST OP COMP  4      FH:  DM:  Thyroid:  Autoimmune:  Other:    SH:  Smoking  Etoh:  Recreational Drugs:  Social Life:    Current Meds:  acetaminophen   Tablet .. 650 milliGRAM(s) Oral every 6 hours PRN  aluminum hydroxide/magnesium hydroxide/simethicone Suspension 30 milliLiter(s) Oral four times a day PRN  bisacodyl Suppository 10 milliGRAM(s) Rectal daily PRN  chlorhexidine 4% Liquid 1 Application(s) Topical <User Schedule>  collagenase Ointment 1 Application(s) Topical daily  dextrose 40% Gel 15 Gram(s) Oral once PRN  dextrose 5%. 1000 milliLiter(s) IV Continuous <Continuous>  dextrose 50% Injectable 12.5 Gram(s) IV Push once  dextrose 50% Injectable 25 Gram(s) IV Push once  dextrose 50% Injectable 25 Gram(s) IV Push once  digoxin     Tablet 0.125 milliGRAM(s) Oral daily  DULoxetine 90 milliGRAM(s) Oral daily  enoxaparin Injectable 40 milliGRAM(s) SubCutaneous daily  furosemide    Tablet 40 milliGRAM(s) Oral daily  glucagon  Injectable 1 milliGRAM(s) IntraMuscular once PRN  HYDROmorphone  Injectable 0.5 milliGRAM(s) IV Push every 4 hours PRN  insulin lispro (HumaLOG) Pump 1 Each SubCutaneous Continuous Pump  magnesium hydroxide Suspension 30 milliLiter(s) Oral daily PRN  metolazone 2.5 milliGRAM(s) Oral daily  metoprolol succinate  milliGRAM(s) Oral daily  ondansetron Injectable 4 milliGRAM(s) IV Push every 4 hours PRN  oxyCODONE    IR 5 milliGRAM(s) Oral every 4 hours PRN  oxyCODONE    IR 10 milliGRAM(s) Oral every 4 hours PRN  pantoprazole    Tablet 40 milliGRAM(s) Oral before breakfast  polyethylene glycol 3350 17 Gram(s) Oral daily  rifAMPin 300 milliGRAM(s) Oral two times a day  rosuvastatin 40 milliGRAM(s) Oral at bedtime  senna 2 Tablet(s) Oral at bedtime PRN  sodium chloride 0.9% lock flush 10 milliLiter(s) IV Push every 1 hour PRN      Allergies:  ACE inhibitors (Hives)  enalapril (Hives)      ROS:  Denies the following except as indicated.    General: weight loss/weight gain, decreased appetite, fatigue  Eyes: Blurry vision, double vision, visual changes  ENT: Throat pain, changes in voice,   CV: palpitations, SOB, CP, cough  GI: NVD, difficulty swallowing, abdominal pain  : polyuria, dysuria  Endo: abnormal menses, temperature intolerance, decreased libido  MSK: weakness, joint pain  Skin: rash, dryness, diaphoresis  Heme: Easy bruising,bleeding  Neuro: HA, dizziness, lightheadedness, numbness tingling  Psych: Anxiety, Depression    Vital Signs Last 24 Hrs  T(C): 36.5 (24 Dec 2019 20:57), Max: 36.5 (24 Dec 2019 20:57)  T(F): 97.7 (24 Dec 2019 20:57), Max: 97.7 (24 Dec 2019 20:57)  HR: 76 (24 Dec 2019 20:57) (76 - 89)  BP: 97/60 (24 Dec 2019 20:57) (97/60 - 125/73)  BP(mean): --  RR: 17 (24 Dec 2019 20:57) (17 - 198)  SpO2: 97% (24 Dec 2019 20:57) (95% - 97%)  Height (cm): 185.42 (12-21 @ 13:21)  Weight (kg): 106.6 (12-21 @ 13:21)  BMI (kg/m2): 31 (12-21 @ 13:21)      Constitutional: wn/wd in NAD.   HEENT: NCAT, MMM, OP clear, EOMI, , no proptosis or lid retraction  Neck: no thyromegaly or palpable thyroid nodules   Respiratory: lungs CTAB.  Cardiovascular: regular rhythm, normal S1 and S2, no audible murmurs, no peripheral edema  GI: soft, NT/ND, no masses/HSM appreciated.  Neurology: no tremors, DTR 2+  Skin: no visible rashes/lesions  Psychiatric: AAO x 3, normal affect/mood.  Ext: radial pulses intact, DP pulses intact, extremities warm, no cyanosis, clubbing or edema.       LABS:                        10.8   7.98  )-----------( 319      ( 24 Dec 2019 06:33 )             36.4     12-24    137  |  98  |  33<H>  ----------------------------<  172<H>  3.8   |  22  |  1.12    Ca    8.6      24 Dec 2019 06:33          Hemoglobin A1C, Whole Blood: 7.5 (10-22 @ 07:00)        RADIOLOGY & ADDITIONAL STUDIES:  CAPILLARY BLOOD GLUCOSE      POCT Blood Glucose.: 91 mg/dL (24 Dec 2019 17:25)        A/P:64y Male    1.  DM  Please continue lantus       units at night / morning.  Please continue lispro      units before each meal.  Please continue lispro moderate / low dose sliding scale four times daily with meals and at bedtime    Pt's fingerstick glucose goal is     Will continue to monitor     For discharge, pt can continue    Pt can follow up at discharge with [facility] by calling  to make an appointment.   Will discuss case with     and update primary team HPI: 64 M with PMHx of CAD, MI in 2018 s/p CABG and PCI (RCA stent with restenosis while on Plavix) on DAPT, DM II, DLD, Celiac, HFrEF s/p Right TKA on 10/21/19 complicated by surgical site infection admitted to [facility] 11/22-11/26 s/p washout and on PICC antibiotics for MRSA (last day 1/6/2020) recently dc'd 12/16 for chf exacerbation presents after eval w Dr Estes yest for continued orthopedic care and monitoring for his ongoing wound infection.  Pt noted wound dehiscence and purulent drainage starting in Oct w cont infection; currently on rifampin (off for a bit 2/2 lft elevation but restarted ~ 1 wk ago) and vanomycin.  Pt notes cont pain similar to prior w/o sig change.  denies any fevers, nausea or vomiting.       PMH & Surgical Hx:WOUNG INFECTION AFTERSURGERY  Family history of heart disease (Mother)  Family history of heart disease (Mother)  Handoff  MEWS Score  History of celiac disease  CHF (congestive heart failure)  HTN (hypertension)  Diabetes  Blood clot due to device, implant, or graft  CKD (chronic kidney disease) stage 2, GFR 60-89 ml/min  COPD, moderate  Hyperkalemia  HLD (hyperlipidemia)  Chronic systolic CHF (congestive heart failure)  Osteoarthritis  HTN (hypertension)  Type 2 diabetes mellitus with neurological manifestations  Type 2 diabetes mellitus  History of surgery to heart and great vessels, presenting hazards to health  Atherosclerosis of coronary artery  Status post percutaneous transluminal coronary angioplasty  CHF (congestive heart failure)  HTN (hypertension)  Wound infection after surgery  Wound infection after surgery  S/P TKR (total knee replacement), right  S/P CABG x 1  Stented coronary artery  History of surgery to major organs, presenting hazards to health  Other postprocedural status  POST OP COMP  4      FH:  DM:  Thyroid:  Autoimmune:  Other:    SH:  Smoking  Etoh:  Recreational Drugs:  Social Life:    Current Meds:  acetaminophen   Tablet .. 650 milliGRAM(s) Oral every 6 hours PRN  aluminum hydroxide/magnesium hydroxide/simethicone Suspension 30 milliLiter(s) Oral four times a day PRN  bisacodyl Suppository 10 milliGRAM(s) Rectal daily PRN  chlorhexidine 4% Liquid 1 Application(s) Topical <User Schedule>  collagenase Ointment 1 Application(s) Topical daily  dextrose 40% Gel 15 Gram(s) Oral once PRN  dextrose 5%. 1000 milliLiter(s) IV Continuous <Continuous>  dextrose 50% Injectable 12.5 Gram(s) IV Push once  dextrose 50% Injectable 25 Gram(s) IV Push once  dextrose 50% Injectable 25 Gram(s) IV Push once  digoxin     Tablet 0.125 milliGRAM(s) Oral daily  DULoxetine 90 milliGRAM(s) Oral daily  enoxaparin Injectable 40 milliGRAM(s) SubCutaneous daily  furosemide    Tablet 40 milliGRAM(s) Oral daily  glucagon  Injectable 1 milliGRAM(s) IntraMuscular once PRN  HYDROmorphone  Injectable 0.5 milliGRAM(s) IV Push every 4 hours PRN  insulin lispro (HumaLOG) Pump 1 Each SubCutaneous Continuous Pump  magnesium hydroxide Suspension 30 milliLiter(s) Oral daily PRN  metolazone 2.5 milliGRAM(s) Oral daily  metoprolol succinate  milliGRAM(s) Oral daily  ondansetron Injectable 4 milliGRAM(s) IV Push every 4 hours PRN  oxyCODONE    IR 5 milliGRAM(s) Oral every 4 hours PRN  oxyCODONE    IR 10 milliGRAM(s) Oral every 4 hours PRN  pantoprazole    Tablet 40 milliGRAM(s) Oral before breakfast  polyethylene glycol 3350 17 Gram(s) Oral daily  rifAMPin 300 milliGRAM(s) Oral two times a day  rosuvastatin 40 milliGRAM(s) Oral at bedtime  senna 2 Tablet(s) Oral at bedtime PRN  sodium chloride 0.9% lock flush 10 milliLiter(s) IV Push every 1 hour PRN      Allergies:  ACE inhibitors (Hives)  enalapril (Hives)      ROS:  Denies the following except as indicated.    General: weight loss/weight gain, decreased appetite, fatigue  Eyes: Blurry vision, double vision, visual changes  ENT: Throat pain, changes in voice,   CV: palpitations, SOB, CP, cough  GI: NVD, difficulty swallowing, abdominal pain  : polyuria, dysuria  Endo: abnormal menses, temperature intolerance, decreased libido  MSK: weakness, joint pain  Skin: rash, dryness, diaphoresis  Heme: Easy bruising,bleeding  Neuro: HA, dizziness, lightheadedness, numbness tingling  Psych: Anxiety, Depression    Vital Signs Last 24 Hrs  T(C): 36.5 (24 Dec 2019 20:57), Max: 36.5 (24 Dec 2019 20:57)  T(F): 97.7 (24 Dec 2019 20:57), Max: 97.7 (24 Dec 2019 20:57)  HR: 76 (24 Dec 2019 20:57) (76 - 89)  BP: 97/60 (24 Dec 2019 20:57) (97/60 - 125/73)  BP(mean): --  RR: 17 (24 Dec 2019 20:57) (17 - 198)  SpO2: 97% (24 Dec 2019 20:57) (95% - 97%)  Height (cm): 185.42 (12-21 @ 13:21)  Weight (kg): 106.6 (12-21 @ 13:21)  BMI (kg/m2): 31 (12-21 @ 13:21)      Constitutional: wn/wd in NAD.   HEENT: NCAT, MMM, OP clear, EOMI, , no proptosis or lid retraction  Neck: no thyromegaly or palpable thyroid nodules   Respiratory: lungs CTAB.  Cardiovascular: regular rhythm, normal S1 and S2, no audible murmurs, no peripheral edema  GI: soft, NT/ND, no masses/HSM appreciated.  Neurology: no tremors, DTR 2+  Skin: no visible rashes/lesions  Psychiatric: AAO x 3, normal affect/mood.  Ext: radial pulses intact, DP pulses intact, extremities warm, no cyanosis, clubbing or edema.       LABS:                        10.8   7.98  )-----------( 319      ( 24 Dec 2019 06:33 )             36.4     12-24    137  |  98  |  33<H>  ----------------------------<  172<H>  3.8   |  22  |  1.12    Ca    8.6      24 Dec 2019 06:33          Hemoglobin A1C, Whole Blood: 7.5 (10-22 @ 07:00)        RADIOLOGY & ADDITIONAL STUDIES:  CAPILLARY BLOOD GLUCOSE      POCT Blood Glucose.: 91 mg/dL (24 Dec 2019 17:25)        A/P:64y Male    1.  DM  Please continue lantus       units at night / morning.  Please continue lispro      units before each meal.  Please continue lispro moderate / low dose sliding scale four times daily with meals and at bedtime    Pt's fingerstick glucose goal is     Will continue to monitor     For discharge, pt can continue    Pt can follow up at discharge with [facility] by calling  to make an appointment.   Will discuss case with     and update primary team HPI: 64 M with PMHx of CAD, MI in 2018 s/p CABG and PCI (RCA stent with restenosis while on Plavix) on DAPT, DM II, DLD, Celiac, HFrEF s/p Right TKA on 10/21/19 complicated by surgical site infection admitted to [facility] 11/22-11/26 s/p washout and on PICC antibiotics for MRSA (last day 1/6/2020) recently dc'd 12/16 for chf exacerbation presents after eval w Dr Estes yest for continued orthopedic care and monitoring for his ongoing wound infection.  Pt noted wound dehiscence and purulent drainage starting in Oct w cont infection; currently on rifampin (off for a bit 2/2 lft elevation but restarted ~ 1 wk ago) and vanomycin.  Pt notes cont pain similar to prior w/o sig change.  denies any fevers, nausea or vomiting.     Endocrine History  Age at Dx: 32 years ago in his 1990s  How dx: routine blood work. Had some polyuria, polydipsia, polyphagia  Hx and duration of insulin: He was on metformin and exenatide before. Has been on insulin for the past 6 years. Has been on insulin pump for the past 5 years.  Current insulin Pump Settings  Basal rate @ 4 units/hr  ICR   1200 AM - 4  1200 Noon 3.5  900 PM - 6    ISF 1:25    Target Blood glucose 100    The last 90 days blood glucose level show 39% in 80 - 150, 44% in 151-240, 14% > 240, 1% <70, and 2% 70 to 79    Current Therapy: on insulin pump - [employer]onic 670G but patent is using in non-automatic mode - changed 2 days ago, has freestyle storm CGM monitoring - last changed 10 days ago, Jardiance 10mg once daily, Trulicity 1.5mg/once a week - recently for 4 months   Hx of hypoglycemia- has FSG in60s once or twice a month. happens in the early morning when he takes some juice  Hx of DKA/HHS - denies    Hx of other regimens  Complications: neuropathy, retinopathy  Outpatient Endo:       PMH & Surgical Hx:WOUNG INFECTION AFTERSURGERY  Family history of heart disease (Mother)  Family history of heart disease (Mother)  Handoff  MEWS Score  History of celiac disease  CHF (congestive heart failure)  HTN (hypertension)  Diabetes  Blood clot due to device, implant, or graft  CKD (chronic kidney disease) stage 2, GFR 60-89 ml/min  COPD, moderate  Hyperkalemia  HLD (hyperlipidemia)  Chronic systolic CHF (congestive heart failure)  Osteoarthritis  HTN (hypertension)  Type 2 diabetes mellitus with neurological manifestations  Type 2 diabetes mellitus  History of surgery to heart and great vessels, presenting hazards to health  Atherosclerosis of coronary artery  Status post percutaneous transluminal coronary angioplasty  CHF (congestive heart failure)  HTN (hypertension)  Wound infection after surgery  Wound infection after surgery  S/P TKR (total knee replacement), right  S/P CABG x 1  Stented coronary artery  History of surgery to major organs, presenting hazards to health  Other postprocedural status  POST OP COMP  4      FH:  DM:  Thyroid:  Autoimmune:  Other:    SH:  Smoking  Etoh:  Recreational Drugs:  Social Life:    Current Meds:  acetaminophen   Tablet .. 650 milliGRAM(s) Oral every 6 hours PRN  aluminum hydroxide/magnesium hydroxide/simethicone Suspension 30 milliLiter(s) Oral four times a day PRN  bisacodyl Suppository 10 milliGRAM(s) Rectal daily PRN  chlorhexidine 4% Liquid 1 Application(s) Topical <User Schedule>  collagenase Ointment 1 Application(s) Topical daily  dextrose 40% Gel 15 Gram(s) Oral once PRN  dextrose 5%. 1000 milliLiter(s) IV Continuous <Continuous>  dextrose 50% Injectable 12.5 Gram(s) IV Push once  dextrose 50% Injectable 25 Gram(s) IV Push once  dextrose 50% Injectable 25 Gram(s) IV Push once  digoxin     Tablet 0.125 milliGRAM(s) Oral daily  DULoxetine 90 milliGRAM(s) Oral daily  enoxaparin Injectable 40 milliGRAM(s) SubCutaneous daily  furosemide    Tablet 40 milliGRAM(s) Oral daily  glucagon  Injectable 1 milliGRAM(s) IntraMuscular once PRN  HYDROmorphone  Injectable 0.5 milliGRAM(s) IV Push every 4 hours PRN  insulin lispro (HumaLOG) Pump 1 Each SubCutaneous Continuous Pump  magnesium hydroxide Suspension 30 milliLiter(s) Oral daily PRN  metolazone 2.5 milliGRAM(s) Oral daily  metoprolol succinate  milliGRAM(s) Oral daily  ondansetron Injectable 4 milliGRAM(s) IV Push every 4 hours PRN  oxyCODONE    IR 5 milliGRAM(s) Oral every 4 hours PRN  oxyCODONE    IR 10 milliGRAM(s) Oral every 4 hours PRN  pantoprazole    Tablet 40 milliGRAM(s) Oral before breakfast  polyethylene glycol 3350 17 Gram(s) Oral daily  rifAMPin 300 milliGRAM(s) Oral two times a day  rosuvastatin 40 milliGRAM(s) Oral at bedtime  senna 2 Tablet(s) Oral at bedtime PRN  sodium chloride 0.9% lock flush 10 milliLiter(s) IV Push every 1 hour PRN      Allergies:  ACE inhibitors (Hives)  enalapril (Hives)      ROS:  Denies the following except as indicated.    General: weight loss/weight gain, decreased appetite, fatigue  Eyes: Blurry vision, double vision, visual changes  ENT: Throat pain, changes in voice,   CV: palpitations, SOB, CP, cough  GI: NVD, difficulty swallowing, abdominal pain  : polyuria, dysuria  Endo: abnormal menses, temperature intolerance, decreased libido  MSK: weakness, joint pain  Skin: rash, dryness, diaphoresis  Heme: Easy bruising,bleeding  Neuro: HA, dizziness, lightheadedness, numbness tingling  Psych: Anxiety, Depression    Vital Signs Last 24 Hrs  T(C): 36.5 (24 Dec 2019 20:57), Max: 36.5 (24 Dec 2019 20:57)  T(F): 97.7 (24 Dec 2019 20:57), Max: 97.7 (24 Dec 2019 20:57)  HR: 76 (24 Dec 2019 20:57) (76 - 89)  BP: 97/60 (24 Dec 2019 20:57) (97/60 - 125/73)  BP(mean): --  RR: 17 (24 Dec 2019 20:57) (17 - 198)  SpO2: 97% (24 Dec 2019 20:57) (95% - 97%)  Height (cm): 185.42 (12-21 @ 13:21)  Weight (kg): 106.6 (12-21 @ 13:21)  BMI (kg/m2): 31 (12-21 @ 13:21)      Constitutional: wn/wd in NAD.   HEENT: NCAT, MMM, OP clear, EOMI, , no proptosis or lid retraction  Neck: no thyromegaly or palpable thyroid nodules   Respiratory: lungs CTAB.  Cardiovascular: regular rhythm, normal S1 and S2, no audible murmurs, no peripheral edema  GI: soft, NT/ND, no masses/HSM appreciated.  Neurology: no tremors, DTR 2+  Skin: no visible rashes/lesions  Psychiatric: AAO x 3, normal affect/mood.  Ext: radial pulses intact, DP pulses intact, extremities warm, no cyanosis, clubbing or edema.       LABS:                        10.8   7.98  )-----------( 319      ( 24 Dec 2019 06:33 )             36.4     12-24    137  |  98  |  33<H>  ----------------------------<  172<H>  3.8   |  22  |  1.12    Ca    8.6      24 Dec 2019 06:33          Hemoglobin A1C, Whole Blood: 7.5 (10-22 @ 07:00)        RADIOLOGY & ADDITIONAL STUDIES:  CAPILLARY BLOOD GLUCOSE      POCT Blood Glucose.: 91 mg/dL (24 Dec 2019 17:25)        A/P:64y Male    1.  DM  Please continue lantus       units at night / morning.  Please continue lispro      units before each meal.  Please continue lispro moderate / low dose sliding scale four times daily with meals and at bedtime    Pt's fingerstick glucose goal is     Will continue to monitor     For discharge, pt can continue    Pt can follow up at discharge with [facility] by calling  to make an appointment.   Will discuss case with     and update primary team HPI: 64 M with PMHx of CAD, MI in 2018 s/p CABG and PCI (RCA stent with restenosis while on Plavix) on DAPT, DM II, DLD, Celiac, HFrEF s/p Right TKA on 10/21/19 complicated by surgical site infection admitted to [facility] - s/p washout and on PICC antibiotics for MRSA (last day 2020) recently dc'd  for chf exacerbation presents after eval w Dr Estes yest for continued orthopedic care and monitoring for his ongoing wound infection.  Pt noted wound dehiscence and purulent drainage starting in Oct w cont infection; currently on rifampin and vancomycin - ID and plastic surgery is following the patient..  Pt notes cont pain similar to prior w/o sig change.  denies any fevers, nausea or vomiting. he was also diagnosed with celiac disease and has been having some diarrhea and some nausea on and off since sep 2019.  Endocrine was consulted for his DM management. He was seen by  over the weekend and was asked to make changes in his insulin pump setting. He went down on his insulin pump settings to basal rate of 2 units/hr and was asked to give 2 units bolus for each meal irrespective of the FSg as his oral intake was very poor. He has insulin supplies for next 3 days at his bedside. He signed his insulin pump policy with  over the weekend. He uses free style storm for CGM.   Basal rate 2 units/hr  FSG & Insulin received:  Yesterday:  pre-dinner fs, 2 nutritional lispro   units  bedtime fs, 1 units lispro SS    Today:  pre-breakfast fs, 2 units lispro SS  pre-lunch fs, 2  units lispro SS  Pre-dinner FSG was 91    Endocrine History  Age at Dx: 32 years ago in his   How dx: routine blood work. Had some polyuria, polydipsia, polyphagia  Hx and duration of insulin: He was on metformin and exenatide before. Has been on insulin for the past 6 years. Has been on insulin pump for the past 5 year  Prior insulin Pump Settings  Basal rate @ 4 units/hr  ICR   1200 AM - 4  1200 Noon 3.5  900 PM - 6    ISF 1:25    Target Blood glucose 100    Current Therapy: on insulin pump - [employer] 670G but patent is using in non-automatic mode - changed 2 days ago, has freestyle storm CGM monitoring - last changed 10 days ago, Jardiance 10mg once daily, Trulicity 1.5mg/once a week - recently for 6 months   Hx of hypoglycemia- has FSG in 60s once or twice a month. happens in the early morning when he takes some juice  Hx of DKA/HHS - denies    Hx of other regimens  Complications: neuropathy, retinopathy  Outpatient Endo:       [facility] Hx:WOUNG INFECTION AFTERSURGERY  Family history of heart disease (Mother)  History of celiac disease  CHF (congestive heart failure)  HTN (hypertension)  Diabetes  Blood clot due to device, implant, or graft  CKD (chronic kidney disease) stage 2, GFR 60-89 ml/min  COPD, moderate  Hyperkalemia  HLD (hyperlipidemia)  Chronic systolic CHF (congestive heart failure)  Osteoarthritis  HTN (hypertension)  Type 2 diabetes mellitus with neurological manifestations  Status post percutaneous transluminal coronary angioplasty  CHF (congestive heart failure)  HTN (hypertension)  Wound infection after surgery  Wound infection after surgery  S/P TKR (total knee replacement), right  S/P CABG x 1  Stented coronary artery        Current Meds:  acetaminophen   Tablet .. 650 milliGRAM(s) Oral every 6 hours PRN  aluminum hydroxide/magnesium hydroxide/simethicone Suspension 30 milliLiter(s) Oral four times a day PRN  bisacodyl Suppository 10 milliGRAM(s) Rectal daily PRN  chlorhexidine 4% Liquid 1 Application(s) Topical <User Schedule>  collagenase Ointment 1 Application(s) Topical daily  dextrose 40% Gel 15 Gram(s) Oral once PRN  dextrose 5%. 1000 milliLiter(s) IV Continuous <Continuous>  dextrose 50% Injectable 12.5 Gram(s) IV Push once  dextrose 50% Injectable 25 Gram(s) IV Push once  dextrose 50% Injectable 25 Gram(s) IV Push once  digoxin     Tablet 0.125 milliGRAM(s) Oral daily  DULoxetine 90 milliGRAM(s) Oral daily  enoxaparin Injectable 40 milliGRAM(s) SubCutaneous daily  furosemide    Tablet 40 milliGRAM(s) Oral daily  glucagon  Injectable 1 milliGRAM(s) IntraMuscular once PRN  HYDROmorphone  Injectable 0.5 milliGRAM(s) IV Push every 4 hours PRN  insulin lispro (HumaLOG) Pump 1 Each SubCutaneous Continuous Pump  magnesium hydroxide Suspension 30 milliLiter(s) Oral daily PRN  metolazone 2.5 milliGRAM(s) Oral daily  metoprolol succinate  milliGRAM(s) Oral daily  ondansetron Injectable 4 milliGRAM(s) IV Push every 4 hours PRN  oxyCODONE    IR 5 milliGRAM(s) Oral every 4 hours PRN  oxyCODONE    IR 10 milliGRAM(s) Oral every 4 hours PRN  pantoprazole    Tablet 40 milliGRAM(s) Oral before breakfast  polyethylene glycol 3350 17 Gram(s) Oral daily  rifAMPin 300 milliGRAM(s) Oral two times a day  rosuvastatin 40 milliGRAM(s) Oral at bedtime  senna 2 Tablet(s) Oral at bedtime PRN  sodium chloride 0.9% lock flush 10 milliLiter(s) IV Push every 1 hour PRN      Allergies:  ACE inhibitors (Hives)  enalapril (Hives)      ROS:  Denies the following except as indicated.    General: decreased appetite, fatigue  Eyes: Blurry vision, double vision, visual changes  ENT: Throat pain, changes in voice,   CV: palpitations, SOB, CP, cough  GI: difficulty swallowing, abdominal pain  : polyuria, dysuria  Endo: temperature intolerance, decreased libido  MSK: weakness, joint pain  Skin: rash, dryness, diaphoresis  Heme: Easy bruising,bleeding  Neuro: HA, dizziness, lightheadedness, numbness tingling  Psych: Anxiety, Depression    Vital Signs Last 24 Hrs  T(C): 36.5 (24 Dec 2019 20:57), Max: 36.5 (24 Dec 2019 20:57)  T(F): 97.7 (24 Dec 2019 20:57), Max: 97.7 (24 Dec 2019 20:57)  HR: 76 (24 Dec 2019 20:57) (76 - 89)  BP: 97/60 (24 Dec 2019 20:57) (97/60 - 125/73)  BP(mean): --  RR: 17 (24 Dec 2019 20:57) (17 - 198)  SpO2: 97% (24 Dec 2019 20:57) (95% - 97%)  Height (cm): 185.42 ( @ 13:21)  Weight (kg): 106.6 ( @ 13:21)  BMI (kg/m2): 31 ( @ 13:21)      Constitutional: wn/wd in NAD.   HEENT: no proptosis or lid retraction  Neck: no thyromegaly or palpable thyroid nodules   Respiratory: lungs CTAB.  Cardiovascular: regular rhythm, normal S1 and S2, no peripheral edema  GI: soft, NT/ND, no masses/HSM appreciated.  Neurology: no tremors, DTR 2+  Skin: no visible rashes/lesions  Psychiatric: AAO x 3, normal affect/mood.  Ext: radial pulses intact, DP pulses intact, extremities warm      LABS:                        10.8   7.98  )-----------( 319      ( 24 Dec 2019 06:33 )             36.4         137  |  98  |  33<H>  ----------------------------<  172<H>  3.8   |  22  |  1.12    Ca    8.6      24 Dec 2019 06:33          Hemoglobin A1C, Whole Blood: 7.5 (10-22 @ 07:00)        RADIOLOGY & ADDITIONAL STUDIES:  CAPILLARY BLOOD GLUCOSE      POCT Blood Glucose.: 91 mg/dL (24 Dec 2019 17:25)        A/P:Patient is 63 y/o maleDM, CVT, CHF, HLD, CAD  who presents with right knee OA s/p right total knee arthroplasty    1.  DM Type 2 - controlled - with neuropathy and retinopathy  Hba1c 7.5  Cr/GFR 0.97/97  MUGA scan showed EF 26%  Wt 106.6 kg with BMI 31  Patient can continue to be on insulin pump - Patient signed the insulin pump policy.  We increased the basal rate to 2.5 units/hr. These changes were made in the insulin pump.   He can give 2 units bolus via the insulin pump for each meal  PLease check FSG AC and HS  Will continue to monitor     For discharge, TBD    Pt can follow up at discharge with , [facility] by calling  to make an appointment.   Discussed case with

## 2019-12-24 NOTE — PROGRESS NOTE ADULT - ASSESSMENT
A/P 63yo Male with hx of CAD, PCI/stent one year ago, DM well controlled , admitted for R knee surgical wound infection, s/p TKR 10/21, s/p I&D, Poly exchange , OR culture (+) MRSA onn 11/22, on IV Vancomycin, readmitted for Wound dehiscence

## 2019-12-24 NOTE — CONSULT NOTE ADULT - SUBJECTIVE AND OBJECTIVE BOX
Patient is a 64y old  Male who presents with a chief complaint of wound necrosis s/p TKA (23 Dec 2019 11:38)      HPI:  64 M with PMHx of CAD, MI in 2018 s/p CABG and PCI (RCA stent with restenosis while on Plavix) on DAPT, DM II, DLD, Celiac, HFrEF s/p Right TKA on 10/21/19 complicated by surgical site infection admitted to [facility] 11/22-11/26 s/p washout and on PICC antibiotics for MRSA (last day 1/6/2020) recently dc'd 12/16 for chf exacerbation presents after eval w Dr Estes yest for continued orthopedic care and monitoring for his ongoing wound infection.  Pt noted wound dehiscence and purulent drainage starting in Oct w cont infection; currently on rifampin (off for a bit 2/2 lft elevation but restarted ~ 1 wk ago) and vanomycin.  Pt notes cont pain similar to prior w/o sig change.  denies any fevers, nausea or vomiting. (21 Dec 2019 17:22). No complaints of chest pain, dyspnea.      PAST MEDICAL & SURGICAL HISTORY:  History of celiac disease  CHF (congestive heart failure)  HTN (hypertension)  Diabetes  Blood clot due to device, implant, or graft: was on blood thinners  HLD (hyperlipidemia)  Chronic systolic CHF (congestive heart failure)  Osteoarthritis  Atherosclerosis of coronary artery: CAD (coronary artery disease)  Status post percutaneous transluminal coronary angioplasty: in 2012  S/P TKR (total knee replacement), right  S/P CABG x 1: 2018  Stented coronary artery: 10/18 heart attack  INFERIOR WALL MI  Other postprocedural status: Fixation hardware in foot LEFT        MEDICATIONS  (STANDING):  atorvastatin 80 milliGRAM(s) Oral at bedtime  chlorhexidine 4% Liquid 1 Application(s) Topical <User Schedule>  collagenase Ointment 1 Application(s) Topical daily  dextrose 5%. 1000 milliLiter(s) (50 mL/Hr) IV Continuous <Continuous>  dextrose 50% Injectable 12.5 Gram(s) IV Push once  dextrose 50% Injectable 25 Gram(s) IV Push once  dextrose 50% Injectable 25 Gram(s) IV Push once  digoxin     Tablet 0.125 milliGRAM(s) Oral daily  DULoxetine 90 milliGRAM(s) Oral daily  enoxaparin Injectable 40 milliGRAM(s) SubCutaneous daily  furosemide    Tablet 40 milliGRAM(s) Oral daily  metolazone 2.5 milliGRAM(s) Oral daily  metoprolol succinate  milliGRAM(s) Oral daily  pantoprazole    Tablet 40 milliGRAM(s) Oral before breakfast  polyethylene glycol 3350 17 Gram(s) Oral daily  rifAMPin 300 milliGRAM(s) Oral two times a day  vancomycin  IVPB 1000 milliGRAM(s) IV Intermittent every 12 hours    MEDICATIONS  (PRN):  acetaminophen   Tablet .. 650 milliGRAM(s) Oral every 6 hours PRN Temp greater or equal to 38C (100.4F), Mild Pain (1 - 3)  aluminum hydroxide/magnesium hydroxide/simethicone Suspension 30 milliLiter(s) Oral four times a day PRN Indigestion  bisacodyl Suppository 10 milliGRAM(s) Rectal daily PRN If no bowel movement by postoperative day #2  dextrose 40% Gel 15 Gram(s) Oral once PRN Blood Glucose LESS THAN 70 milliGRAM(s)/deciliter  glucagon  Injectable 1 milliGRAM(s) IntraMuscular once PRN Glucose LESS THAN 70 milligrams/deciliter  HYDROmorphone  Injectable 0.5 milliGRAM(s) IV Push every 4 hours PRN Severe Pain (7 - 10)  magnesium hydroxide Suspension 30 milliLiter(s) Oral daily PRN Constipation  ondansetron Injectable 4 milliGRAM(s) IV Push every 6 hours PRN Nausea and/or Vomiting  oxyCODONE    IR 5 milliGRAM(s) Oral every 4 hours PRN Moderate Pain (4 - 6)  oxyCODONE    IR 10 milliGRAM(s) Oral every 4 hours PRN Severe Pain (7 - 10)  senna 2 Tablet(s) Oral at bedtime PRN Constipation  sodium chloride 0.9% lock flush 10 milliLiter(s) IV Push every 1 hour PRN Pre/post blood products, medications, blood draw, and to maintain line patency      FAMILY HISTORY:  Family history of heart disease (Mother)  Family history of heart disease (Mother)      SOCIAL HISTORY:    CIGARETTES:    ALCOHOL:    REVIEW OF SYSTEMS      General: awake, alert  Vital Signs Last 24 Hrs  T(C): 36.3 (24 Dec 2019 05:06), Max: 36.7 (23 Dec 2019 08:50)  T(F): 97.3 (24 Dec 2019 05:06), Max: 98 (23 Dec 2019 08:50)  HR: 83 (24 Dec 2019 05:06) (77 - 88)  BP: 112/59 (24 Dec 2019 05:06) (106/56 - 122/68)  BP(mean): --  RR: 18 (24 Dec 2019 05:06) (16 - 18)  SpO2: 95% (24 Dec 2019 05:06) (95% - 99%)    PHYSICAL EXAM:    Heart- reg S1S2  Lungs- clear  Abd- +BS, NT/ND soft              INTERPRETATION OF TELEMETRY:    ECG:    I&O's Detail    23 Dec 2019 07:01  -  24 Dec 2019 07:00  --------------------------------------------------------  IN:    IV PiggyBack: 750 mL  Total IN: 750 mL    OUT:    Voided: 561 mL  Total OUT: 561 mL    Total NET: 189 mL          LABS:                        10.8   7.98  )-----------( 319      ( 24 Dec 2019 06:33 )             36.4     12-23    133<L>  |  98  |  30<H>  ----------------------------<  242<H>  4.1   |  22  |  0.98    Ca    8.6      23 Dec 2019 06:53              I&O's Summary    23 Dec 2019 07:01  -  24 Dec 2019 07:00  --------------------------------------------------------  IN: 750 mL / OUT: 561 mL / NET: 189 mL      A/P  CAD/CHF- cardiac status stable. Lying flat without dyspnea. No chest pain. Remains in sinus rhythm.  Would continue out-patient meds. Will follow along

## 2019-12-24 NOTE — CONSULT NOTE ADULT - ATTENDING COMMENTS
Pt seen on rounds this afternoon.  Has type 2 DM but treated with insulin pump--uses a 670G but without closed-loop option.  His previous basal rate of 4 units/hr was clearly excessive at home, and he has been decreased to 2 U/hr in the hospital, but has blood sugars in the 200 range.  Dropped to 91 at 5 PM today, however--reason unclear.  Will increase the basal slightly to 2.5 U/hr and assess his blood sugars overnight tonight to evaluate his basal requirements--he may need 3 U/hr but can decide this tomorrow.  He is not eating well, and vomited earlier--blames his celiac disease, but I suspect it may be his analgesics

## 2019-12-24 NOTE — PROGRESS NOTE ADULT - SUBJECTIVE AND OBJECTIVE BOX
Ortho Note    Pt comfortable without complaints, pain controlled  Denies CP, SOB, N/V, numbness/tingling     Vital Signs Last 24 Hrs  T(C): 36.3 (12-24-19 @ 08:32), Max: 36.3 (12-24-19 @ 05:06)  T(F): 97.4 (12-24-19 @ 08:32), Max: 97.4 (12-24-19 @ 08:32)  HR: 89 (12-24-19 @ 08:32) (83 - 89)  BP: 125/73 (12-24-19 @ 08:32) (112/59 - 125/73)  BP(mean): --  RR: 198 (12-24-19 @ 08:32) (18 - 198)  SpO2: 96% (12-24-19 @ 08:32) (95% - 96%)  AVSS    General: Pt Alert and oriented, NAD  in LLC w window to anterior knee  DSG C/D/I.   Pulses: cap refill < 2 secs  Sensation: SILT over distal limb and symmetric to contralateral side  Motor:5/5  EHL/FHL/TA/GS                          10.8   7.98  )-----------( 319      ( 24 Dec 2019 06:33 )             36.4   24 Dec 2019 06:33    137    |  98     |  33     ----------------------------<  172    3.8     |  22     |  1.12     Ca    8.6        24 Dec 2019 06:33        A/P: 64yMale s/p w incisional necrosis of TKA on right  - Stable  - Pain Control  - DVT ppx: Lovenox  - PT, WBS: WBAT  - Daily dressing changes  - Dispo: pending    Ortho Pager [phone]

## 2019-12-25 LAB
ANION GAP SERPL CALC-SCNC: 13 MMOL/L — SIGNIFICANT CHANGE UP (ref 5–17)
BUN SERPL-MCNC: 34 MG/DL — HIGH (ref 7–23)
CALCIUM SERPL-MCNC: 9 MG/DL — SIGNIFICANT CHANGE UP (ref 8.4–10.5)
CHLORIDE SERPL-SCNC: 99 MMOL/L — SIGNIFICANT CHANGE UP (ref 96–108)
CO2 SERPL-SCNC: 26 MMOL/L — SIGNIFICANT CHANGE UP (ref 22–31)
CREAT SERPL-MCNC: 1.19 MG/DL — SIGNIFICANT CHANGE UP (ref 0.5–1.3)
CRP SERPL-MCNC: 4.81 MG/DL — HIGH (ref 0–0.4)
ERYTHROCYTE [SEDIMENTATION RATE] IN BLOOD: 10 MM/HR — SIGNIFICANT CHANGE UP
GLUCOSE BLDC GLUCOMTR-MCNC: 183 MG/DL — HIGH (ref 70–99)
GLUCOSE BLDC GLUCOMTR-MCNC: 217 MG/DL — HIGH (ref 70–99)
GLUCOSE BLDC GLUCOMTR-MCNC: 227 MG/DL — HIGH (ref 70–99)
GLUCOSE SERPL-MCNC: 186 MG/DL — HIGH (ref 70–99)
HCT VFR BLD CALC: 37.5 % — LOW (ref 39–50)
HGB BLD-MCNC: 11 G/DL — LOW (ref 13–17)
MCHC RBC-ENTMCNC: 23.4 PG — LOW (ref 27–34)
MCHC RBC-ENTMCNC: 29.3 GM/DL — LOW (ref 32–36)
MCV RBC AUTO: 79.8 FL — LOW (ref 80–100)
NRBC # BLD: 0 /100 WBCS — SIGNIFICANT CHANGE UP (ref 0–0)
PLATELET # BLD AUTO: 369 K/UL — SIGNIFICANT CHANGE UP (ref 150–400)
POTASSIUM SERPL-MCNC: 4.3 MMOL/L — SIGNIFICANT CHANGE UP (ref 3.5–5.3)
POTASSIUM SERPL-SCNC: 4.3 MMOL/L — SIGNIFICANT CHANGE UP (ref 3.5–5.3)
RBC # BLD: 4.7 M/UL — SIGNIFICANT CHANGE UP (ref 4.2–5.8)
RBC # FLD: 18 % — HIGH (ref 10.3–14.5)
SODIUM SERPL-SCNC: 138 MMOL/L — SIGNIFICANT CHANGE UP (ref 135–145)
WBC # BLD: 6.92 K/UL — SIGNIFICANT CHANGE UP (ref 3.8–10.5)
WBC # FLD AUTO: 6.92 K/UL — SIGNIFICANT CHANGE UP (ref 3.8–10.5)

## 2019-12-25 RX ADMIN — POLYETHYLENE GLYCOL 3350 17 GRAM(S): 17 POWDER, FOR SOLUTION ORAL at 12:26

## 2019-12-25 RX ADMIN — OXYCODONE HYDROCHLORIDE 10 MILLIGRAM(S): 5 TABLET ORAL at 05:00

## 2019-12-25 RX ADMIN — CHLORHEXIDINE GLUCONATE 1 APPLICATION(S): 213 SOLUTION TOPICAL at 05:50

## 2019-12-25 RX ADMIN — Medication 1 APPLICATION(S): at 05:47

## 2019-12-25 RX ADMIN — OXYCODONE HYDROCHLORIDE 10 MILLIGRAM(S): 5 TABLET ORAL at 13:08

## 2019-12-25 RX ADMIN — OXYCODONE HYDROCHLORIDE 10 MILLIGRAM(S): 5 TABLET ORAL at 04:18

## 2019-12-25 RX ADMIN — OXYCODONE HYDROCHLORIDE 10 MILLIGRAM(S): 5 TABLET ORAL at 22:04

## 2019-12-25 RX ADMIN — DULOXETINE HYDROCHLORIDE 90 MILLIGRAM(S): 30 CAPSULE, DELAYED RELEASE ORAL at 12:26

## 2019-12-25 RX ADMIN — ENOXAPARIN SODIUM 40 MILLIGRAM(S): 100 INJECTION SUBCUTANEOUS at 12:26

## 2019-12-25 RX ADMIN — Medication 250 MILLIGRAM(S): at 06:05

## 2019-12-25 RX ADMIN — PANTOPRAZOLE SODIUM 40 MILLIGRAM(S): 20 TABLET, DELAYED RELEASE ORAL at 05:49

## 2019-12-25 RX ADMIN — Medication 250 MILLIGRAM(S): at 18:22

## 2019-12-25 RX ADMIN — OXYCODONE HYDROCHLORIDE 10 MILLIGRAM(S): 5 TABLET ORAL at 14:03

## 2019-12-25 RX ADMIN — Medication 0.12 MILLIGRAM(S): at 05:48

## 2019-12-25 RX ADMIN — OXYCODONE HYDROCHLORIDE 10 MILLIGRAM(S): 5 TABLET ORAL at 22:45

## 2019-12-25 RX ADMIN — Medication 40 MILLIGRAM(S): at 05:48

## 2019-12-25 RX ADMIN — Medication 100 MILLIGRAM(S): at 05:48

## 2019-12-25 RX ADMIN — ROSUVASTATIN CALCIUM 40 MILLIGRAM(S): 5 TABLET ORAL at 22:03

## 2019-12-25 NOTE — PROGRESS NOTE ADULT - SUBJECTIVE AND OBJECTIVE BOX
Ortho Note    Pt comfortable without complaints, pain controlled  Denies CP, SOB, N/V, numbness/tingling     Vital Signs Last 24 Hrs  T(C): 36.5 (12-25-19 @ 05:16), Max: 36.5 (12-25-19 @ 05:16)  T(F): 97.7 (12-25-19 @ 05:16), Max: 97.7 (12-25-19 @ 05:16)  HR: 86 (12-25-19 @ 05:16) (86 - 86)  BP: 121/76 (12-25-19 @ 05:16) (121/76 - 121/76)  BP(mean): --  RR: 18 (12-25-19 @ 05:16) (18 - 18)  SpO2: 97% (12-25-19 @ 05:16) (97% - 97%)      General: Pt Alert and oriented, NAD  in LLC  wound dry, no drainage. no erythema present.   Pulses: cap refill < 3 seconds  Sensation: SILT over foot   Motor: 5/5 EHL/FHL/TA/GS                          11.0   6.92  )-----------( 369      ( 25 Dec 2019 05:48 )             37.5   25 Dec 2019 05:48    138    |  99     |  34     ----------------------------<  186    4.3     |  26     |  1.19     Ca    9.0        25 Dec 2019 05:48        A/P: 64yMale s/p R TKA 10/2019 c/b infection treated w I&D and poly exchange in november on Vanco and rifampin for infection admitted secondary to necrosis of incision site.   - Stable  - Pain Control  - DVT ppx: Lovenox  - PT, WBS: WBAT in cast  - Plastics recs  - Endo recs - Hgb A1c 7.5, basal insulin rate in pump increase  - Cardio recs, Digoxin levels low  - continued BID wound care  - contnie trending inflammatory markers    Ortho Pager [phone]

## 2019-12-26 ENCOUNTER — TRANSCRIPTION ENCOUNTER (OUTPATIENT)
Age: 64
End: 2019-12-26

## 2019-12-26 LAB
ANION GAP SERPL CALC-SCNC: 10 MMOL/L — SIGNIFICANT CHANGE UP (ref 5–17)
BUN SERPL-MCNC: 33 MG/DL — HIGH (ref 7–23)
CALCIUM SERPL-MCNC: 9 MG/DL — SIGNIFICANT CHANGE UP (ref 8.4–10.5)
CHLORIDE SERPL-SCNC: 95 MMOL/L — LOW (ref 96–108)
CO2 SERPL-SCNC: 28 MMOL/L — SIGNIFICANT CHANGE UP (ref 22–31)
CREAT SERPL-MCNC: 1.12 MG/DL — SIGNIFICANT CHANGE UP (ref 0.5–1.3)
CRP SERPL-MCNC: 4.07 MG/DL — HIGH (ref 0–0.4)
DIGOXIN SERPL-MCNC: 0.6 NG/ML — LOW (ref 0.8–2)
ERYTHROCYTE [SEDIMENTATION RATE] IN BLOOD: 16 MM/HR — SIGNIFICANT CHANGE UP
GLUCOSE BLDC GLUCOMTR-MCNC: 138 MG/DL — HIGH (ref 70–99)
GLUCOSE BLDC GLUCOMTR-MCNC: 155 MG/DL — HIGH (ref 70–99)
GLUCOSE BLDC GLUCOMTR-MCNC: 189 MG/DL — HIGH (ref 70–99)
GLUCOSE BLDC GLUCOMTR-MCNC: 82 MG/DL — SIGNIFICANT CHANGE UP (ref 70–99)
GLUCOSE SERPL-MCNC: 116 MG/DL — HIGH (ref 70–99)
HCT VFR BLD CALC: 36.7 % — LOW (ref 39–50)
HGB BLD-MCNC: 11.3 G/DL — LOW (ref 13–17)
MCHC RBC-ENTMCNC: 24 PG — LOW (ref 27–34)
MCHC RBC-ENTMCNC: 30.8 GM/DL — LOW (ref 32–36)
MCV RBC AUTO: 78.1 FL — LOW (ref 80–100)
NRBC # BLD: 0 /100 WBCS — SIGNIFICANT CHANGE UP (ref 0–0)
PLATELET # BLD AUTO: 375 K/UL — SIGNIFICANT CHANGE UP (ref 150–400)
POTASSIUM SERPL-MCNC: 4.1 MMOL/L — SIGNIFICANT CHANGE UP (ref 3.5–5.3)
POTASSIUM SERPL-SCNC: 4.1 MMOL/L — SIGNIFICANT CHANGE UP (ref 3.5–5.3)
RBC # BLD: 4.7 M/UL — SIGNIFICANT CHANGE UP (ref 4.2–5.8)
RBC # FLD: 18.6 % — HIGH (ref 10.3–14.5)
SODIUM SERPL-SCNC: 133 MMOL/L — LOW (ref 135–145)
VANCOMYCIN TROUGH SERPL-MCNC: 18.3 UG/ML — SIGNIFICANT CHANGE UP (ref 10–20)
WBC # BLD: 7.6 K/UL — SIGNIFICANT CHANGE UP (ref 3.8–10.5)
WBC # FLD AUTO: 7.6 K/UL — SIGNIFICANT CHANGE UP (ref 3.8–10.5)

## 2019-12-26 PROCEDURE — 99231 SBSQ HOSP IP/OBS SF/LOW 25: CPT | Mod: GC

## 2019-12-26 RX ORDER — COLLAGENASE CLOSTRIDIUM HIST. 250 UNIT/G
1 OINTMENT (GRAM) TOPICAL
Qty: 1 | Refills: 0
Start: 2019-12-26 | End: 2020-01-08

## 2019-12-26 RX ORDER — HYDROCORTISONE 1 %
1 OINTMENT (GRAM) TOPICAL DAILY
Refills: 0 | Status: DISCONTINUED | OUTPATIENT
Start: 2019-12-26 | End: 2019-12-27

## 2019-12-26 RX ORDER — PRASUGREL 5 MG/1
10 TABLET, FILM COATED ORAL DAILY
Refills: 0 | Status: DISCONTINUED | OUTPATIENT
Start: 2019-12-27 | End: 2019-12-27

## 2019-12-26 RX ADMIN — Medication 250 MILLIGRAM(S): at 18:56

## 2019-12-26 RX ADMIN — Medication 100 MILLIGRAM(S): at 05:08

## 2019-12-26 RX ADMIN — OXYCODONE HYDROCHLORIDE 5 MILLIGRAM(S): 5 TABLET ORAL at 17:10

## 2019-12-26 RX ADMIN — OXYCODONE HYDROCHLORIDE 10 MILLIGRAM(S): 5 TABLET ORAL at 22:00

## 2019-12-26 RX ADMIN — OXYCODONE HYDROCHLORIDE 5 MILLIGRAM(S): 5 TABLET ORAL at 18:10

## 2019-12-26 RX ADMIN — PANTOPRAZOLE SODIUM 40 MILLIGRAM(S): 20 TABLET, DELAYED RELEASE ORAL at 05:08

## 2019-12-26 RX ADMIN — ENOXAPARIN SODIUM 40 MILLIGRAM(S): 100 INJECTION SUBCUTANEOUS at 12:15

## 2019-12-26 RX ADMIN — Medication 0.12 MILLIGRAM(S): at 05:08

## 2019-12-26 RX ADMIN — Medication 1 APPLICATION(S): at 06:48

## 2019-12-26 RX ADMIN — Medication 1 APPLICATION(S): at 21:31

## 2019-12-26 RX ADMIN — OXYCODONE HYDROCHLORIDE 10 MILLIGRAM(S): 5 TABLET ORAL at 21:31

## 2019-12-26 RX ADMIN — OXYCODONE HYDROCHLORIDE 10 MILLIGRAM(S): 5 TABLET ORAL at 05:07

## 2019-12-26 RX ADMIN — Medication 250 MILLIGRAM(S): at 05:08

## 2019-12-26 RX ADMIN — OXYCODONE HYDROCHLORIDE 10 MILLIGRAM(S): 5 TABLET ORAL at 05:45

## 2019-12-26 RX ADMIN — CHLORHEXIDINE GLUCONATE 1 APPLICATION(S): 213 SOLUTION TOPICAL at 06:48

## 2019-12-26 RX ADMIN — ROSUVASTATIN CALCIUM 40 MILLIGRAM(S): 5 TABLET ORAL at 21:31

## 2019-12-26 RX ADMIN — DULOXETINE HYDROCHLORIDE 90 MILLIGRAM(S): 30 CAPSULE, DELAYED RELEASE ORAL at 12:17

## 2019-12-26 NOTE — DISCHARGE NOTE PROVIDER - PROVIDER TOKENS
PROVIDER:[TOKEN:[[id number]],FOLLOWUP:[2 weeks]],PROVIDER:[TOKEN:[[id number]],FOLLOWUP:[2 weeks]],PROVIDER:[TOKEN:[[id number]],FOLLOWUP:[1 week]],PROVIDER:[TOKEN:[[id number]],FOLLOWUP:[2 weeks]]

## 2019-12-26 NOTE — DIETITIAN INITIAL EVALUATION ADULT. - OTHER INFO
64M with hx of CAD, MI in 2018 s/p CABG and PCI (RCA stent with restenosis while on Plavix) on DAPT, DM II, DLD, Celiac, HFrEF s/p Right TKA on 10/21/19 complicated by surgical site infection admitted to [facility] 11/22-11/26 s/p washout and on PICC antibiotics for MRSA (last day 1/6/2020) recently d/c from [facility] 12/16 for CHF exacerbation admitting for necrosis of wound infection- pt noted to be non-compliant with extension at home. Started on IV abx for necrotic infection. Plan for d/c tomorrow per rounds and EMR. On assessment, pt was fast asleep. Hx provided by RN. Currently on CST CHO diet with evening snack. Tolerating % of meals with no complaints. Pt has stated understanding of BS control to RN and attempts to keep BS <180s. Follows CST CHO diet at home. NKFA per EMR. Education deferred at this time. GI: WDL, last BM 12/24. Skin: WDL, wound noted, arelis score 20. Pain: well controlled on medication. Please see below for nutrition recommendations. RD to follow up per protocol.

## 2019-12-26 NOTE — DISCHARGE NOTE NURSING/CASE MANAGEMENT/SOCIAL WORK - PATIENT PORTAL LINK FT
You can access the FollowMyHealth Patient Portal offered by [facility] by registering at the following website: [email or web address]. By joining [employer]’s FollowMyHealth portal, you will also be able to view your health information using other applications (apps) compatible with our system.

## 2019-12-26 NOTE — DISCHARGE NOTE PROVIDER - NSDCHHNEEDSERVICE_GEN_ALL_CORE
Medication teaching and assessment/Observation and assessment/Teaching and training/Rehabilitation services/Wound care and assessment

## 2019-12-26 NOTE — DISCHARGE NOTE PROVIDER - HOSPITAL COURSE
Admitted 12/21/19    Plastics Consult    Wound Care    Pain control    DVT prophylaxis    OOB/Physical Therapy

## 2019-12-26 NOTE — DISCHARGE NOTE PROVIDER - NSDCFUADDINST_GEN_ALL_CORE_FT
Wound care instructions:  Cleanse surgical wound once daily with normal saline. Apply collagenase and wet to dry dressing once daily.    Apply medihoney to both heels and cover with dry dressing once daily (gauze, markus, etc.). Keep heels offloaded.   ____________________________________  ESR, CRP, CBC, CMP, VANC troph should be drawn weekly starting Tuesday 12/31. Results should be faxed to Infectious Disease Dr. Saeed [phone].  ___________________________________    Weight bear as tolerated with assistive device. Keep leg in cast at all times. Do not get cast wet.  No strenuous activity, heavy lifting, driving or returning to work until cleared by MD.    Try to have regular bowel movements, take stool softener or laxative if necessary.    Call to schedule an appt with Dr. Estes for follow-up.    Contact your doctor if you experience: fever greater than 101.5, chills, chest pain, difficulty breathing, redness or excessive drainage around the incision, other concerns.  Follow up with your primary care provider. Wound care instructions:  Cleanse surgical wound once daily with normal saline. Apply collagenase and wet to dry dressing once daily.    Apply medihoney to both heels and cover with dry dressing once daily (gauze, markus, etc.). Keep heels offloaded.   ____________________________________  ESR, CRP, CBC, CMP, VANC troph should be drawn weekly starting Tuesday 12/31. Results should be faxed to Infectious Disease Dr. Saeed [phone].  ___________________________________    Weight bear as tolerated with assistive device. Keep leg in cast at all times. Do not get cast wet.  No strenuous activity, heavy lifting, driving or returning to work until cleared by MD.    Try to have regular bowel movements, take stool softener or laxative if necessary.    Call to schedule an appt with Dr. Estes for follow-up.    Your sodium has been slightly low during your hospital admission - please follow up with your primary care provider.     Contact your doctor if you experience: fever greater than 101.5, chills, chest pain, difficulty breathing, redness or excessive drainage around the incision, other concerns.  Follow up with your primary care provider.

## 2019-12-26 NOTE — PROGRESS NOTE ADULT - SUBJECTIVE AND OBJECTIVE BOX
Ortho Note    Pt comfortable without complaints, pain controlled  Denies CP, SOB, N/V, numbness/tingling     Vital Signs Last 24 Hrs  T(C): 36.7 (12-26-19 @ 05:03), Max: 36.7 (12-26-19 @ 05:03)  T(F): 98 (12-26-19 @ 05:03), Max: 98 (12-26-19 @ 05:03)  HR: 76 (12-26-19 @ 05:03) (76 - 76)  BP: 129/77 (12-26-19 @ 05:03) (129/77 - 129/77)  BP(mean): --  RR: 17 (12-26-19 @ 05:03) (17 - 17)  SpO2: 98% (12-26-19 @ 05:03) (98% - 98%)      General: Pt Alert and oriented, NAD  in LLC w Incision exposed, dressings over incision c/d/i  Pulses: cap refill < 3 seconds  Sensation: SILT over distal limb and symmetric to contralateral side  Motor: 5/5 EHL/FHL/TA/GS                          11.0   6.92  )-----------( 369      ( 25 Dec 2019 05:48 )             37.5   25 Dec 2019 05:48    138    |  99     |  34     ----------------------------<  186    4.3     |  26     |  1.19           A/P: 64yMale s/p R TKA 10/2019 c/b infection treated w I&D and poly exchange in november on Vanco and rifampin for infection admitted secondary to necrosis of incision site.   - Stable  - Pain Control  - DVT ppx: Lovenox  - PT, WBS: WBAT in cast  - Plastics recs  - Endo following  - Cards following  - continued BID wound care  - continue trending inflammatory markers    Ortho Pager [phone]

## 2019-12-26 NOTE — DIETITIAN INITIAL EVALUATION ADULT. - ADD RECOMMEND
1. Regular diet 2. Recommend add MVI 3. Recommend add Zinc sulfate 220 mg/day, Vit C 500 mg/day 4. Continue to encourage adequate PO intake of Lean protein to aid with wound healing

## 2019-12-26 NOTE — DIETITIAN INITIAL EVALUATION ADULT. - PERTINENT MEDS FT
Effient  Vanco  Rifampin  Zofran  Crestor  Dulcolax  insulin  Protonix  Lanoxin  Lasix  Mg Hydroxide  Zaroxolyn   Miralax

## 2019-12-26 NOTE — DISCHARGE NOTE PROVIDER - NSDCFUSCHEDAPPT_GEN_ALL_CORE_FT
[facility] ; 12/27/2019 ; [facility] [address] [facility] ; 12/27/2019 ; [facility] [address]  [facility] ; 12/31/2019 ; [facility] [address] [facility] ; 12/31/2019 ; [facility] [address]

## 2019-12-26 NOTE — DISCHARGE NOTE PROVIDER - NSDCMRMEDTOKEN_GEN_ALL_CORE_FT
acetaminophen 325 mg oral tablet: 2 tab(s) orally every 6 hours  aspirin 81 mg oral delayed release tablet: 1 tab(s) orally 2 times a day. CONTINUE UNTIL 1 MONTH AFTER SURGERY (). THEN DECREASE DOSE BACK TO ONCE DAILY.  CBC, CMP, ESR, CRP, VANCO TROPH: 1x/ week for 4 weeks    start date: 19  Send results to HÉCTOR Concepcion  fax: [phone]    diagnosis: T84.53XA  Crestor 40 mg oral tablet: 1 tab(s) orally once a day  Cymbalta: 90 milligram(s) orally once a day  digoxin 125 mcg (0.125 mg) oral tablet: 1 tab(s) orally once a day  furosemide 40 mg oral tablet: 1 tab(s) orally once a day  heparin 3mL flush from 5mL syringe: administer after each infusion  Jardiance 10 mg oral tablet: 1 tab(s) orally once a day (in the morning)  metOLazone 2.5 mg oral tablet: 1 tab(s) orally once a day, take 30 minutes before Lasix  Metoprolol Succinate  mg oral tablet, extended release: 1 tab(s) orally once a day  NS FLUSH 10 mL: administer before and after each infusion  ondansetron 4 mg oral tablet, disintegratin tab(s) orally 3 times a day -for nausea   oxycodone-acetaminophen 5 mg-325 mg oral tablet: 1-2 tab(s) orally every 4-6 hours, as needed for moderate to severe pain  pantoprazole 40 mg oral delayed release tablet: 1 tab(s) orally once a day (before a meal)  polyethylene glycol 3350 oral powder for reconstitution: 17 gram(s) orally once a day  prasugrel 10 mg oral tablet: 1 tab(s) orally once a day  rifAMPin 150 mg oral capsule: 2 cap(s) orally every 12 hours for 6 weeks  senna oral tablet: 2 tab(s) orally once a day (at bedtime), As needed, Constipation  Trulicity Pen 1.5 mg/0.5 mL subcutaneous solution:   vancomycin 750mg  in NS 250ml every 12 hours : Last date of administration: 20    diagnosis: T84.53XA  Zetia 10 mg oral tablet: 1 tab(s) orally once a day acetaminophen 325 mg oral tablet: 2 tab(s) orally every 6 hours  aspirin 81 mg oral delayed release tablet: 1 tab(s) orally 2 times a day. CONTINUE UNTIL 1 MONTH AFTER SURGERY (). THEN DECREASE DOSE BACK TO ONCE DAILY.  CBC, CMP, ESR, CRP, VANCO TROPH: 1x/ week for 4 weeks    start date: 19  Send results to HÉCTOR Concepcion  fax: [phone]    diagnosis: T84.53XA  collagenase 250 units/g topical ointment: 1 application topically once a day  Crestor 40 mg oral tablet: 1 tab(s) orally once a day  Cymbalta: 90 milligram(s) orally once a day  digoxin 125 mcg (0.125 mg) oral tablet: 1 tab(s) orally once a day  furosemide 40 mg oral tablet: 1 tab(s) orally once a day  heparin 3mL flush from 5mL syringe: administer after each infusion  Jardiance 10 mg oral tablet: 1 tab(s) orally once a day (in the morning)  metOLazone 2.5 mg oral tablet: 1 tab(s) orally once a day, take 30 minutes before Lasix  Metoprolol Succinate  mg oral tablet, extended release: 1 tab(s) orally once a day  NS FLUSH 10 mL: administer before and after each infusion  ondansetron 4 mg oral tablet, disintegratin tab(s) orally 3 times a day -for nausea   oxycodone-acetaminophen 5 mg-325 mg oral tablet: 1-2 tab(s) orally every 4-6 hours, as needed for moderate to severe pain MDD:4  pantoprazole 40 mg oral delayed release tablet: 1 tab(s) orally once a day (before a meal)  polyethylene glycol 3350 oral powder for reconstitution: 17 gram(s) orally once a day  prasugrel 10 mg oral tablet: 1 tab(s) orally once a day  rifAMPin 300 mg oral capsule: 1 cap(s) orally 2 times a day until 20  senna oral tablet: 2 tab(s) orally once a day (at bedtime), As needed, Constipation  Trulicity Pen 1.5 mg/0.5 mL subcutaneous solution:   vancomycin 750mg  in NS 250ml every 12 hours : Last date of administration: 20    diagnosis: T84.53XA  Zetia 10 mg oral tablet: 1 tab(s) orally once a day acetaminophen 325 mg oral tablet: 2 tab(s) orally every 6 hours  aspirin 81 mg oral delayed release tablet: 1 tab(s) orally 2 times a day. CONTINUE UNTIL 1 MONTH AFTER SURGERY (). THEN DECREASE DOSE BACK TO ONCE DAILY.  CBC, CMP, ESR, CRP, VANCO TROPH: 1x/ week for 4 weeks    start date: 19  Send results to HÉCTOR Concepcion  fax: [phone]    diagnosis: T84.53XA  collagenase 250 units/g topical ointment: 1 application topically once a day  Crestor 40 mg oral tablet: 1 tab(s) orally once a day  Cymbalta: 90 milligram(s) orally once a day  digoxin 125 mcg (0.125 mg) oral tablet: 1 tab(s) orally once a day  furosemide 40 mg oral tablet: 1 tab(s) orally once a day  heparin 3mL flush from 5mL syringe: administer after each infusion  Jardiance 10 mg oral tablet: 1 tab(s) orally once a day (in the morning)  metOLazone 2.5 mg oral tablet: 1 tab(s) orally once a day, take 30 minutes before Lasix  Metoprolol Succinate  mg oral tablet, extended release: 1 tab(s) orally once a day  NS FLUSH 10 mL: administer before and after each infusion  ondansetron 4 mg oral tablet, disintegratin tab(s) orally 3 times a day -for nausea   oxycodone-acetaminophen 5 mg-325 mg oral tablet: 1-2 tab(s) orally every 4-6 hours, as needed for moderate to severe pain MDD:4  pantoprazole 40 mg oral delayed release tablet: 1 tab(s) orally once a day (before a meal)  polyethylene glycol 3350 oral powder for reconstitution: 17 gram(s) orally once a day  prasugrel 10 mg oral tablet: 1 tab(s) orally once a day  rifAMPin 300 mg oral capsule: 1 cap(s) orally 2 times a day until 20  senna oral tablet: 2 tab(s) orally once a day (at bedtime), As needed, Constipation  Trulicity Pen 1.5 mg/0.5 mL subcutaneous solution:   vancomycin 750mg  in NS 250ml every 12 hours : Last date of administration: 20    diagnosis: T84.53XA  Zetia 10 mg oral tablet: 1 tab(s) orally once a day collagenase 250 units/g topical ointment: 1 application topically once a day  Crestor 40 mg oral tablet: 1 tab(s) orally once a day  Cymbalta: 90 milligram(s) orally once a day  digoxin 125 mcg (0.125 mg) oral tablet: 1 tab(s) orally once a day  furosemide 40 mg oral tablet: 1 tab(s) orally once a day  Jardiance 10 mg oral tablet: 1 tab(s) orally once a day (in the morning)  metOLazone 2.5 mg oral tablet: 1 tab(s) orally once a day, take 30 minutes before Lasix  Metoprolol Succinate  mg oral tablet, extended release: 1 tab(s) orally once a day  oxycodone-acetaminophen 5 mg-325 mg oral tablet: 1-2 tab(s) orally every 4-6 hours, as needed for moderate to severe pain MDD:4  pantoprazole 40 mg oral delayed release tablet: 1 tab(s) orally once a day (before a meal)  polyethylene glycol 3350 oral powder for reconstitution: 17 gram(s) orally once a day  prasugrel 10 mg oral tablet: 1 tab(s) orally once a day  rifAMPin 300 mg oral capsule: 1 cap(s) orally 2 times a day until 1/20/20  senna oral tablet: 2 tab(s) orally once a day (at bedtime), As needed, Constipation  Trulicity Pen 1.5 mg/0.5 mL subcutaneous solution:

## 2019-12-26 NOTE — PROGRESS NOTE ADULT - SUBJECTIVE AND OBJECTIVE BOX
INTERVAL HPI/OVERNIGHT EVENTS:    Patient is a 64y old  Male who presents with a chief complaint of wound necrosis s/p TKA (26 Dec 2019 05:13)      Pt reports the following symptoms:    CONSTITUTIONAL:  Negative fever or chills, feels well, good appetite  EYES:  Negative  blurry vision or double vision  CARDIOVASCULAR:  Negative for chest pain or palpitations  RESPIRATORY:  Negative for cough, wheezing, or SOB   GASTROINTESTINAL:  Negative for nausea, vomiting, diarrhea, constipation, or abdominal pain  GENITOURINARY:  Negative frequency, urgency or dysuria  NEUROLOGIC:  No headache, confusion, dizziness, lightheadedness    MEDICATIONS  (STANDING):  chlorhexidine 4% Liquid 1 Application(s) Topical <User Schedule>  collagenase Ointment 1 Application(s) Topical daily  dextrose 5%. 1000 milliLiter(s) (50 mL/Hr) IV Continuous <Continuous>  dextrose 50% Injectable 12.5 Gram(s) IV Push once  dextrose 50% Injectable 25 Gram(s) IV Push once  dextrose 50% Injectable 25 Gram(s) IV Push once  digoxin     Tablet 0.125 milliGRAM(s) Oral daily  DULoxetine 90 milliGRAM(s) Oral daily  enoxaparin Injectable 40 milliGRAM(s) SubCutaneous daily  furosemide    Tablet 40 milliGRAM(s) Oral daily  insulin lispro (HumaLOG) Pump 1 Each SubCutaneous Continuous Pump  metolazone 2.5 milliGRAM(s) Oral daily  metoprolol succinate  milliGRAM(s) Oral daily  pantoprazole    Tablet 40 milliGRAM(s) Oral before breakfast  polyethylene glycol 3350 17 Gram(s) Oral daily  rifAMPin 300 milliGRAM(s) Oral two times a day  rosuvastatin 40 milliGRAM(s) Oral at bedtime  vancomycin  IVPB 750 milliGRAM(s) IV Intermittent every 12 hours    MEDICATIONS  (PRN):  acetaminophen   Tablet .. 650 milliGRAM(s) Oral every 6 hours PRN Temp greater or equal to 38C (100.4F), Mild Pain (1 - 3)  aluminum hydroxide/magnesium hydroxide/simethicone Suspension 30 milliLiter(s) Oral four times a day PRN Indigestion  bisacodyl Suppository 10 milliGRAM(s) Rectal daily PRN If no bowel movement by postoperative day #2  dextrose 40% Gel 15 Gram(s) Oral once PRN Blood Glucose LESS THAN 70 milliGRAM(s)/deciliter  glucagon  Injectable 1 milliGRAM(s) IntraMuscular once PRN Glucose LESS THAN 70 milligrams/deciliter  HYDROmorphone  Injectable 0.5 milliGRAM(s) IV Push every 4 hours PRN Severe Pain (7 - 10)  magnesium hydroxide Suspension 30 milliLiter(s) Oral daily PRN Constipation  ondansetron Injectable 4 milliGRAM(s) IV Push every 4 hours PRN Nausea and/or Vomiting  oxyCODONE    IR 5 milliGRAM(s) Oral every 4 hours PRN Moderate Pain (4 - 6)  oxyCODONE    IR 10 milliGRAM(s) Oral every 4 hours PRN Severe Pain (7 - 10)  senna 2 Tablet(s) Oral at bedtime PRN Constipation  sodium chloride 0.9% lock flush 10 milliLiter(s) IV Push every 1 hour PRN Pre/post blood products, medications, blood draw, and to maintain line patency      PHYSICAL EXAM  Vital Signs Last 24 Hrs  T(C): 36.7 (26 Dec 2019 05:03), Max: 36.7 (26 Dec 2019 05:03)  T(F): 98 (26 Dec 2019 05:03), Max: 98 (26 Dec 2019 05:03)  HR: 76 (26 Dec 2019 05:03) (76 - 78)  BP: 129/77 (26 Dec 2019 05:03) (109/64 - 129/77)  BP(mean): --  RR: 17 (26 Dec 2019 05:03) (17 - 18)  SpO2: 98% (26 Dec 2019 05:03) (93% - 98%)    Constitutional: wn/wd in NAD.   HEENT: NCAT, MMM, OP clear, EOMI, no proptosis or lid retraction  Neck: no thyromegaly or palpable thyroid nodules   Respiratory: lungs CTAB.  Cardiovascular: regular rhythm, normal S1 and S2, no audible murmurs, no peripheral edema  GI: soft, NT/ND, no masses/HSM appreciated.  Neurology: no tremors, DTR 2+  Skin: no visible rashes/lesions  Psychiatric: AAO x 3, normal affect/mood.    LABS:                        11.0   6.92  )-----------( 369      ( 25 Dec 2019 05:48 )             37.5     12-25    138  |  99  |  34<H>  ----------------------------<  186<H>  4.3   |  26  |  1.19    Ca    9.0      25 Dec 2019 05:48              HbA1C: 7.5 % (10-22 @ 07:00)    CAPILLARY BLOOD GLUCOSE      POCT Blood Glucose.: 82 mg/dL (26 Dec 2019 06:41)  POCT Blood Glucose.: 227 mg/dL (25 Dec 2019 21:20)  POCT Blood Glucose.: 217 mg/dL (25 Dec 2019 17:11)      Insulin Sliding Scale requirements X 24 Hours:    RADIOLOGY & ADDITIONAL TESTS:    A/P: 64y Male with history of DM type II presenting for       1.  DM -     Please continue           units lantus at bedtime  / in the morning and        units lispro with meals and lispro moderate / low dose sliding scale 4 times daily with meals and at bedtime.  Please continue consistent carbohydrate diet.      Goal FSG is   Will continue to monitor   For discharge, pt can continue    Pt can follow up at discharge with [facility] by calling  to make an appointment.   Will discuss case with     and update primary team INTERVAL HPI/OVERNIGHT EVENTS:    Patient seen and examined at the bedside. he is being planned for discharge tomorrow. He did have some nausea today morning. But Since then, he has been stable.   FSG & Insulin received:  Yesterday:  pre-dinner fs,   bedtime fs, gave 2 units bolus  Today:  pre-breakfast fs, 2  units lispro SS  pre-lunch fs, 2  units lispro SS      Pt reports the following symptoms:    CONSTITUTIONAL:  Negative fever or chills, feels well, good appetite  EYES:  Negative  blurry vision or double vision  CARDIOVASCULAR:  Negative for chest pain or palpitations  RESPIRATORY:  Negative for cough, wheezing, or SOB   GASTROINTESTINAL:  Negative for diarrhea, constipation, or abdominal pain  GENITOURINARY:  Negative frequency, urgency or dysuria  NEUROLOGIC:  No headache, confusion, dizziness, lightheadedness    MEDICATIONS  (STANDING):  chlorhexidine 4% Liquid 1 Application(s) Topical <User Schedule>  collagenase Ointment 1 Application(s) Topical daily  dextrose 5%. 1000 milliLiter(s) (50 mL/Hr) IV Continuous <Continuous>  dextrose 50% Injectable 12.5 Gram(s) IV Push once  dextrose 50% Injectable 25 Gram(s) IV Push once  dextrose 50% Injectable 25 Gram(s) IV Push once  digoxin     Tablet 0.125 milliGRAM(s) Oral daily  DULoxetine 90 milliGRAM(s) Oral daily  enoxaparin Injectable 40 milliGRAM(s) SubCutaneous daily  furosemide    Tablet 40 milliGRAM(s) Oral daily  insulin lispro (HumaLOG) Pump 1 Each SubCutaneous Continuous Pump  metolazone 2.5 milliGRAM(s) Oral daily  metoprolol succinate  milliGRAM(s) Oral daily  pantoprazole    Tablet 40 milliGRAM(s) Oral before breakfast  polyethylene glycol 3350 17 Gram(s) Oral daily  rifAMPin 300 milliGRAM(s) Oral two times a day  rosuvastatin 40 milliGRAM(s) Oral at bedtime  vancomycin  IVPB 750 milliGRAM(s) IV Intermittent every 12 hours    MEDICATIONS  (PRN):  acetaminophen   Tablet .. 650 milliGRAM(s) Oral every 6 hours PRN Temp greater or equal to 38C (100.4F), Mild Pain (1 - 3)  aluminum hydroxide/magnesium hydroxide/simethicone Suspension 30 milliLiter(s) Oral four times a day PRN Indigestion  bisacodyl Suppository 10 milliGRAM(s) Rectal daily PRN If no bowel movement by postoperative day #2  dextrose 40% Gel 15 Gram(s) Oral once PRN Blood Glucose LESS THAN 70 milliGRAM(s)/deciliter  glucagon  Injectable 1 milliGRAM(s) IntraMuscular once PRN Glucose LESS THAN 70 milligrams/deciliter  HYDROmorphone  Injectable 0.5 milliGRAM(s) IV Push every 4 hours PRN Severe Pain (7 - 10)  magnesium hydroxide Suspension 30 milliLiter(s) Oral daily PRN Constipation  ondansetron Injectable 4 milliGRAM(s) IV Push every 4 hours PRN Nausea and/or Vomiting  oxyCODONE    IR 5 milliGRAM(s) Oral every 4 hours PRN Moderate Pain (4 - 6)  oxyCODONE    IR 10 milliGRAM(s) Oral every 4 hours PRN Severe Pain (7 - 10)  senna 2 Tablet(s) Oral at bedtime PRN Constipation  sodium chloride 0.9% lock flush 10 milliLiter(s) IV Push every 1 hour PRN Pre/post blood products, medications, blood draw, and to maintain line patency      PHYSICAL EXAM  Vital Signs Last 24 Hrs  T(C): 36.7 (26 Dec 2019 05:03), Max: 36.7 (26 Dec 2019 05:03)  T(F): 98 (26 Dec 2019 05:03), Max: 98 (26 Dec 2019 05:03)  HR: 76 (26 Dec 2019 05:03) (76 - 78)  BP: 129/77 (26 Dec 2019 05:03) (109/64 - 129/77)  BP(mean): --  RR: 17 (26 Dec 2019 05:03) (17 - 18)  SpO2: 98% (26 Dec 2019 05:03) (93% - 98%)    Constitutional: wn/wd in NAD.   Respiratory: lungs CTAB.  Cardiovascular: regular rhythm, normal S1 and S2, no peripheral edema  GI: soft, NT/ND, no masses/HSM appreciated.  Neurology: no tremors, DTR 2+      LABS:                        11.0   6.92  )-----------( 369      ( 25 Dec 2019 05:48 )             37.5     12-25    138  |  99  |  34<H>  ----------------------------<  186<H>  4.3   |  26  |  1.19    Ca    9.0      25 Dec 2019 05:48              HbA1C: 7.5 % (10-22 @ 07:00)    CAPILLARY BLOOD GLUCOSE      POCT Blood Glucose.: 82 mg/dL (26 Dec 2019 06:41)  POCT Blood Glucose.: 227 mg/dL (25 Dec 2019 21:20)  POCT Blood Glucose.: 217 mg/dL (25 Dec 2019 17:11)      Insulin Sliding Scale requirements X 24 Hours:    RADIOLOGY & ADDITIONAL TESTS:    A/P: Patient is 65 y/o maleDM, CVT, CHF, HLD, CAD  who presents with right knee OA s/p right total knee arthroplasty    1.  DM Type 2 - controlled - with neuropathy and retinopathy  Hba1c 7.5  Cr/GFR 0.97/97  MUGA scan showed EF 26%  Wt 106.6 kg with BMI 31  Patient can continue to be on insulin pump - Patient signed the insulin pump policy.  We decreased the basal rate back to 2.0 units/hr. These changes were made in the insulin pump.   He can give 3 units bolus via the insulin pump for each meal  PLease check FSG AC and HS  Will continue to monitor     For discharge, TBD    Pt can follow up at discharge with , [facility] by calling  to make an appointment.   Discussed case with

## 2019-12-26 NOTE — PROGRESS NOTE ADULT - SUBJECTIVE AND OBJECTIVE BOX
INTERVAL HISTORY:  	No chest pain , dyspnea      MEDICATIONS:  digoxin     Tablet 0.125 milliGRAM(s) Oral daily  furosemide    Tablet 40 milliGRAM(s) Oral daily  metolazone 2.5 milliGRAM(s) Oral daily  metoprolol succinate  milliGRAM(s) Oral daily    rifAMPin 300 milliGRAM(s) Oral two times a day  vancomycin  IVPB 750 milliGRAM(s) IV Intermittent every 12 hours      acetaminophen   Tablet .. 650 milliGRAM(s) Oral every 6 hours PRN  DULoxetine 90 milliGRAM(s) Oral daily  HYDROmorphone  Injectable 0.5 milliGRAM(s) IV Push every 4 hours PRN  ondansetron Injectable 4 milliGRAM(s) IV Push every 4 hours PRN  oxyCODONE    IR 5 milliGRAM(s) Oral every 4 hours PRN  oxyCODONE    IR 10 milliGRAM(s) Oral every 4 hours PRN    aluminum hydroxide/magnesium hydroxide/simethicone Suspension 30 milliLiter(s) Oral four times a day PRN  bisacodyl Suppository 10 milliGRAM(s) Rectal daily PRN  magnesium hydroxide Suspension 30 milliLiter(s) Oral daily PRN  pantoprazole    Tablet 40 milliGRAM(s) Oral before breakfast  polyethylene glycol 3350 17 Gram(s) Oral daily  senna 2 Tablet(s) Oral at bedtime PRN    dextrose 40% Gel 15 Gram(s) Oral once PRN  dextrose 50% Injectable 12.5 Gram(s) IV Push once  dextrose 50% Injectable 25 Gram(s) IV Push once  dextrose 50% Injectable 25 Gram(s) IV Push once  glucagon  Injectable 1 milliGRAM(s) IntraMuscular once PRN  insulin lispro (HumaLOG) Pump 1 Each SubCutaneous Continuous Pump  rosuvastatin 40 milliGRAM(s) Oral at bedtime    chlorhexidine 4% Liquid 1 Application(s) Topical <User Schedule>  collagenase Ointment 1 Application(s) Topical daily  dextrose 5%. 1000 milliLiter(s) IV Continuous <Continuous>  enoxaparin Injectable 40 milliGRAM(s) SubCutaneous daily  sodium chloride 0.9% lock flush 10 milliLiter(s) IV Push every 1 hour PRN      Allergies    ACE inhibitors (Hives)  enalapril (Hives)    Intolerances        Review of Systems  General: No fever, weight change, malaise, decreased appetite,cough   Cardiovascular: No chest pain , dyspnea, palpitations, lightheadedness, dizziness, edema  Gastrointestinal: No nausea, vomiting, diarrhea, constipation, abdominal pain  Urology: no dysuria, heamturia, urgency  Other: No rash or joint pain  Neurology: No headache, neck stiffness, back pain, paresthesia or paresis     PHYSICAL EXAM:  T(C): 35.4 (12-26-19 @ 08:43), Max: 36.7 (12-26-19 @ 05:03)  HR: 70 (12-26-19 @ 08:43) (70 - 78)  BP: 116/76 (12-26-19 @ 08:43) (109/64 - 129/77)  RR: 16 (12-26-19 @ 08:43) (16 - 18)  SpO2: 99% (12-26-19 @ 08:43) (93% - 99%)  Wt(kg): --  I&O's Summary        Appearance: Normal	  HEENT:   Normal oral mucosa, PERRL, EOMI  Neck: FROM supple, no JVD, bruits or thyromegaly	  Lymphatic: No lymphadenopathy  Cardiovascular: Normal S1 S2, No murmurs, rubs or gallops  Respiratory: Lungs clear to auscultation	  Psychiatry: A & O x 3, Mood & affect appropriate  Gastrointestinal:  Soft, NT/ND, + BS, No HSM. No masses	  Skin: No rashes, No ecchymoses  Neurologic: Non-focal  Extremities: leg in cast      TELEMETRY: 	    ECG:  	  RADIOLOGY:   DIAGNOSTIC TESTING:  [ ] Echocardiogram:  [ ]  Catheterization:  [ ] Stress Test:    OTHER: 	    LABS:	 	    CARDIAC MARKERS:                                  11.3   7.60  )-----------( 375      ( 26 Dec 2019 10:55 )             36.7     12-26    133<L>  |  95<L>  |  33<H>  ----------------------------<  116<H>  4.1   |  28  |  1.12    Ca    9.0      26 Dec 2019 10:55      proBNP:   Lipid Profile:   HgA1c:   TSH:     ASSESSMENT/PLAN: 	  Cardiac status stable. Receiving antibiotics. For D/C soon

## 2019-12-26 NOTE — PROGRESS NOTE ADULT - ATTENDING COMMENTS
Pt seen on rounds this afternoon.  Glucoses have been mostly in the 170-220 range, somewhat higher because of improved PO intake.  He nonetheless dropped to 82 this morning, so will decrease his basal rate back to 2.0 U/hr.  He is to increase his meal boluses to 3 units given his improved PO intake

## 2019-12-26 NOTE — ADVANCED PRACTICE NURSE CONSULT - RECOMMEDATIONS
Medihoney to sites on bilat heels, cover with dry dressings, Z-matthias boots bilateraaly. Spoke with NICANOR Worley and ESTHER Restrepo.

## 2019-12-26 NOTE — DISCHARGE NOTE PROVIDER - NSDCFUADDAPPT_GEN_ALL_CORE_FT
Pt can follow up at discharge with , [facility] by calling  to make an appointment.     Please follow-up with Dr. Lerman next week.    Dr. Estes's new contact info ([facility]):  appointment (general #)- [phone]  Zainab (coordinator)- [phone] Please follow-up with Dr. Lerman next week 12/31/20 1:30pm.  Please call Dr. Estes's office to make an appointment to follow-up January 7th at [facility]    Dr. Estes's new contact info ([facility]):  appointment (general #)- [phone]  Zainab (coordinator)- [phone]    Pt can follow up at discharge with , [facility] by calling  to make an appointment.

## 2019-12-26 NOTE — ADVANCED PRACTICE NURSE CONSULT - ASSESSMENT
64yMale s/p R knee I+D, polyexchange 11/2019, readmitted for wound dehiscence/ noncompliance with extension at home. Pt admitted with unstageable pressure injury to right heel from cast on right leg. Site measures approx 2x3 cm, covered with dry scab. Left heel with smaller Stage 2 pressure injury, approx 1x1 cm, pt states he got this when he was in another hospital recently. Orders from plastics for wound to right knee, dressing currently intact. Reports seeing podiatrist as outpatient for serial debridements on plantar aspects of feet and toes.

## 2019-12-26 NOTE — PROGRESS NOTE ADULT - SUBJECTIVE AND OBJECTIVE BOX
Ortho Note    Pt seen and examined. Comfortable without complaints, pain controlled  Denies CP, SOB, N/V, numbness/tingling     Vital Signs Last 24 Hrs  T(C): 35.4 (12-26-19 @ 08:43), Max: 35.4 (12-26-19 @ 08:43)  T(F): 95.8 (12-26-19 @ 08:43), Max: 95.8 (12-26-19 @ 08:43)  HR: 70 (12-26-19 @ 08:43) (70 - 70)  BP: 116/76 (12-26-19 @ 08:43) (116/76 - 116/76)  BP(mean): --  RR: 16 (12-26-19 @ 08:43) (16 - 16)  SpO2: 99% (12-26-19 @ 08:43) (99% - 99%)  AVSS    General: Pt Alert and oriented, NAD  Bilateral heels with ulcerations, patient reports L heel PU was from [facility] from recent admission, and R heel breakdown was from cast at home. Sees podiatrist outpatient.   DSG- hard cast to RLE, window with gauze- WTD dressing changes daily   Pulses: +DP, feet cool, but patient reports this is his baseline.   Sensation: SILT ble  Motor: 5/5 EHL/FHL/TA/GS                          11.3   7.60  )-----------( 375      ( 26 Dec 2019 10:55 )             36.7   26 Dec 2019 10:55    133    |  95     |  33     ----------------------------<  116    4.1     |  28     |  1.12     Ca    9.0        26 Dec 2019 10:55        A/P: 64yMale s/p R knee I+D, polyexchange 11/2019, readmitted for wound dehiscence/ noncompliance with extension at home  - Continue to monitor labs including ESR, CRP  - appreciate ID recs- continue Vanco 750mg q12h, f/u vanco troph prior to 4th dose this evening; continue abx until at least follow-up with Dr. Estes (2nd-3rd week of January)  - Pain Control- continue current regimen, pain well-controlled  - DVT ppx: Lovenox discontinued, can continue home regimen (effient 10mg qd), SCDs  - PT, WBS: WBAT with leg in cast  - digoxin- level mildly subtherapeutic with rifampin. Continue to monitor. HR stable  - hypoglycemia in AM/ hyperglycemia in PM- appreciate endocrine recs, continue insulin pump  - dispo: home with home infusion services/ wound care     Ortho Pager [phone]

## 2019-12-26 NOTE — DISCHARGE NOTE PROVIDER - CARE PROVIDER_API CALL
Santos Estes)  Orthopaedic Surgery  [address], [address] [place] [address]  Phone: [phone]  Fax: [phone]  Follow Up Time: 2 weeks    Michael Cormier)  [address]  Endo  [address], [address]  [place] [address]  Phone: [phone]  Fax: [phone]  Follow Up Time: 2 weeks    Lerman, Oren Z (MD)  Plastic Surgery  [address], Third Floor  [place] [address]  Phone: [phone]  Fax: [phone]  Follow Up Time: 1 week    Zia Saeed)  Infectious Disease; Internal Medicine  [address], [address]  [place] [address]  Phone: [phone]  Fax: [phone]  Follow Up Time: 2 weeks

## 2019-12-26 NOTE — DISCHARGE NOTE PROVIDER - CARE PROVIDERS DIRECT ADDRESSES
,[email or web address]

## 2019-12-26 NOTE — DISCHARGE NOTE PROVIDER - NSDCCPCAREPLAN_GEN_ALL_CORE_FT
PRINCIPAL DISCHARGE DIAGNOSIS  Diagnosis: Wound infection after surgery  Assessment and Plan of Treatment: abx, wound management

## 2019-12-26 NOTE — DISCHARGE NOTE NURSING/CASE MANAGEMENT/SOCIAL WORK - NSDCFUADDAPPT_GEN_ALL_CORE_FT
Please follow-up with Dr. Lerman next week 12/31/20 1:30pm.  Please call Dr. Estes's office to make an appointment to follow-up January 7th at [facility]    Dr. Estes's new contact info ([facility]):  appointment (general #)- [phone]  Zainab (coordinator)- [phone]    Pt can follow up at discharge with , [facility] by calling  to make an appointment.

## 2019-12-26 NOTE — DIETITIAN INITIAL EVALUATION ADULT. - ENERGY NEEDS
Height: 73" Weight: 235lbs, 184IBW lbs+/-10%, %.71%, BMI 31.0 kg/m2  IBW used for calculations as pt >120% of IBW. Needs adjusted for infected wound healing s/p TKA.

## 2019-12-27 ENCOUNTER — APPOINTMENT (OUTPATIENT)
Dept: GASTROENTEROLOGY | Facility: CLINIC | Age: 64
End: 2019-12-27

## 2019-12-27 ENCOUNTER — INBOUND DOCUMENT (OUTPATIENT)
Age: 64
End: 2019-12-27

## 2019-12-27 VITALS
TEMPERATURE: 98 F | HEART RATE: 75 BPM | DIASTOLIC BLOOD PRESSURE: 69 MMHG | SYSTOLIC BLOOD PRESSURE: 123 MMHG | RESPIRATION RATE: 17 BRPM | OXYGEN SATURATION: 97 %

## 2019-12-27 LAB
ALBUMIN SERPL ELPH-MCNC: 3.4 G/DL — SIGNIFICANT CHANGE UP (ref 3.3–5)
ALP SERPL-CCNC: 84 U/L — SIGNIFICANT CHANGE UP (ref 40–120)
ALT FLD-CCNC: 29 U/L — SIGNIFICANT CHANGE UP (ref 10–45)
ANION GAP SERPL CALC-SCNC: 11 MMOL/L — SIGNIFICANT CHANGE UP (ref 5–17)
AST SERPL-CCNC: 24 U/L — SIGNIFICANT CHANGE UP (ref 10–40)
BILIRUB SERPL-MCNC: 0.7 MG/DL — SIGNIFICANT CHANGE UP (ref 0.2–1.2)
BUN SERPL-MCNC: 25 MG/DL — HIGH (ref 7–23)
CALCIUM SERPL-MCNC: 8.6 MG/DL — SIGNIFICANT CHANGE UP (ref 8.4–10.5)
CHLORIDE SERPL-SCNC: 95 MMOL/L — LOW (ref 96–108)
CO2 SERPL-SCNC: 26 MMOL/L — SIGNIFICANT CHANGE UP (ref 22–31)
CREAT SERPL-MCNC: 0.98 MG/DL — SIGNIFICANT CHANGE UP (ref 0.5–1.3)
CRP SERPL-MCNC: 3.76 MG/DL — HIGH (ref 0–0.4)
CULTURE RESULTS: SIGNIFICANT CHANGE UP
CULTURE RESULTS: SIGNIFICANT CHANGE UP
ERYTHROCYTE [SEDIMENTATION RATE] IN BLOOD: 12 MM/HR — SIGNIFICANT CHANGE UP
GLUCOSE BLDC GLUCOMTR-MCNC: 203 MG/DL — HIGH (ref 70–99)
GLUCOSE SERPL-MCNC: 159 MG/DL — HIGH (ref 70–99)
HCT VFR BLD CALC: 36.9 % — LOW (ref 39–50)
HGB BLD-MCNC: 10.7 G/DL — LOW (ref 13–17)
MCHC RBC-ENTMCNC: 23.3 PG — LOW (ref 27–34)
MCHC RBC-ENTMCNC: 29 GM/DL — LOW (ref 32–36)
MCV RBC AUTO: 80.2 FL — SIGNIFICANT CHANGE UP (ref 80–100)
NRBC # BLD: 0 /100 WBCS — SIGNIFICANT CHANGE UP (ref 0–0)
PLATELET # BLD AUTO: 375 K/UL — SIGNIFICANT CHANGE UP (ref 150–400)
POTASSIUM SERPL-MCNC: 4.1 MMOL/L — SIGNIFICANT CHANGE UP (ref 3.5–5.3)
POTASSIUM SERPL-SCNC: 4.1 MMOL/L — SIGNIFICANT CHANGE UP (ref 3.5–5.3)
PROT SERPL-MCNC: 6.9 G/DL — SIGNIFICANT CHANGE UP (ref 6–8.3)
RBC # BLD: 4.6 M/UL — SIGNIFICANT CHANGE UP (ref 4.2–5.8)
RBC # FLD: 18.3 % — HIGH (ref 10.3–14.5)
SODIUM SERPL-SCNC: 132 MMOL/L — LOW (ref 135–145)
SPECIMEN SOURCE: SIGNIFICANT CHANGE UP
SPECIMEN SOURCE: SIGNIFICANT CHANGE UP
WBC # BLD: 7.49 K/UL — SIGNIFICANT CHANGE UP (ref 3.8–10.5)
WBC # FLD AUTO: 7.49 K/UL — SIGNIFICANT CHANGE UP (ref 3.8–10.5)

## 2019-12-27 PROCEDURE — 99285 EMERGENCY DEPT VISIT HI MDM: CPT | Mod: 25

## 2019-12-27 PROCEDURE — 80202 ASSAY OF VANCOMYCIN: CPT

## 2019-12-27 PROCEDURE — 87040 BLOOD CULTURE FOR BACTERIA: CPT

## 2019-12-27 PROCEDURE — 85652 RBC SED RATE AUTOMATED: CPT

## 2019-12-27 PROCEDURE — 82962 GLUCOSE BLOOD TEST: CPT

## 2019-12-27 PROCEDURE — 85027 COMPLETE CBC AUTOMATED: CPT

## 2019-12-27 PROCEDURE — 80053 COMPREHEN METABOLIC PANEL: CPT

## 2019-12-27 PROCEDURE — 80048 BASIC METABOLIC PNL TOTAL CA: CPT

## 2019-12-27 PROCEDURE — 93005 ELECTROCARDIOGRAM TRACING: CPT

## 2019-12-27 PROCEDURE — 97161 PT EVAL LOW COMPLEX 20 MIN: CPT

## 2019-12-27 PROCEDURE — 80162 ASSAY OF DIGOXIN TOTAL: CPT

## 2019-12-27 PROCEDURE — 36415 COLL VENOUS BLD VENIPUNCTURE: CPT

## 2019-12-27 PROCEDURE — 85025 COMPLETE CBC W/AUTO DIFF WBC: CPT

## 2019-12-27 PROCEDURE — 86140 C-REACTIVE PROTEIN: CPT

## 2019-12-27 RX ORDER — INSULIN LISPRO 100/ML
1 VIAL (ML) SUBCUTANEOUS
Refills: 0 | Status: DISCONTINUED | OUTPATIENT
Start: 2019-12-27 | End: 2019-12-27

## 2019-12-27 RX ADMIN — OXYCODONE HYDROCHLORIDE 10 MILLIGRAM(S): 5 TABLET ORAL at 03:14

## 2019-12-27 RX ADMIN — Medication 0.12 MILLIGRAM(S): at 05:48

## 2019-12-27 RX ADMIN — Medication 100 MILLIGRAM(S): at 05:48

## 2019-12-27 RX ADMIN — Medication 1 APPLICATION(S): at 10:56

## 2019-12-27 RX ADMIN — CHLORHEXIDINE GLUCONATE 1 APPLICATION(S): 213 SOLUTION TOPICAL at 05:49

## 2019-12-27 RX ADMIN — Medication 250 MILLIGRAM(S): at 05:48

## 2019-12-27 RX ADMIN — DULOXETINE HYDROCHLORIDE 90 MILLIGRAM(S): 30 CAPSULE, DELAYED RELEASE ORAL at 11:27

## 2019-12-27 RX ADMIN — PRASUGREL 10 MILLIGRAM(S): 5 TABLET, FILM COATED ORAL at 11:27

## 2019-12-27 RX ADMIN — OXYCODONE HYDROCHLORIDE 10 MILLIGRAM(S): 5 TABLET ORAL at 04:14

## 2019-12-27 RX ADMIN — PANTOPRAZOLE SODIUM 40 MILLIGRAM(S): 20 TABLET, DELAYED RELEASE ORAL at 05:48

## 2019-12-27 NOTE — PHYSICAL THERAPY INITIAL EVALUATION ADULT - LEVEL OF INDEPENDENCE: SIT/SUPINE, REHAB EVAL
Patient unwilling to demo bed mobility and sit/stand transfers at this time, reports "I've been getting in and out of bed independently all morning, I'm fine."

## 2019-12-27 NOTE — PROGRESS NOTE ADULT - REASON FOR ADMISSION
wound necrosis s/p TKA

## 2019-12-27 NOTE — PHYSICAL THERAPY INITIAL EVALUATION ADULT - PERTINENT HX OF CURRENT PROBLEM, REHAB EVAL
64M s/p total knee replacement on /2019 c/b infection s/p polyexchange in 11/2019, with continued wound complication

## 2019-12-27 NOTE — PHYSICAL THERAPY INITIAL EVALUATION ADULT - GAIT DISTANCE, PT EVAL
ambulation distance limited by patient; unwilling to ambulate in [place], reports "I'm tryingt o get dressed so I can leave." Gait steady. No difficulty anticipated with longer distance of ambulation./25 feet

## 2019-12-27 NOTE — PHYSICAL THERAPY INITIAL EVALUATION ADULT - ACTIVE RANGE OF MOTION EXAMINATION, REHAB EVAL
bilateral  lower extremity Active ROM was WFL (within functional limits)/(R) knee NT 2/2 cast/bilateral upper extremity Active ROM was WFL (within functional limits)

## 2019-12-27 NOTE — PHYSICAL THERAPY INITIAL EVALUATION ADULT - ADDITIONAL COMMENTS
Patient reports that he lives with his wife, has 3-4 steps to enter then 6-7 steps between first and second levels of his house. Reports that prior to this admission, he was ambulating independently, occasionally with a cane, and driving.

## 2019-12-27 NOTE — PROGRESS NOTE ADULT - SUBJECTIVE AND OBJECTIVE BOX
Orthopaedic Surgery Progress Note    64M s/p total knee replacement on /2019 c/b infection s/p polyexchange in 11/2019, with continued wound complication     Subjective:     Patient seen and examined while RN doing dressing change. Patient comfortable without complaints, pain controlled. Patient is eager to be discharged today.       Objective:    Vital Signs Last 24 Hrs  T(C): 36.7 (12-27-19 @ 08:45), Max: 36.7 (12-27-19 @ 08:45)  T(F): 98.1 (12-27-19 @ 08:45), Max: 98.1 (12-27-19 @ 08:45)  HR: 75 (12-27-19 @ 08:45) (75 - 78)  BP: 123/69 (12-27-19 @ 08:45) (123/69 - 126/70)  BP(mean): --  RR: 17 (12-27-19 @ 08:45) (17 - 18)  SpO2: 97% (12-27-19 @ 08:45) (96% - 97%)  AVSS    PE:  General: Patient alert and oriented, NAD  RLE in cast with window for wound   Skin warm and well perfused  wet to dry dressing with collagenase applied to wound                                10.7   7.49  )-----------( 375      ( 27 Dec 2019 08:26 )             36.9   27 Dec 2019 08:26    132    |  95     |  25     ----------------------------<  159    4.1     |  26     |  0.98     Ca    8.6        27 Dec 2019 08:26    TPro  6.9    /  Alb  3.4    /  TBili  0.7    /  DBili  x      /  AST  24     /  ALT  29     /  AlkPhos  84     27 Dec 2019 08:26      A/P:   64M s/p total knee replacement on /2019 c/b infection s/p polyexchange in 11/2019, with continued wound complication   1. Pain control as needed  2. DVT prophylaxis: effient (home medication)   3. PT, weight-bearing status:  WBAT in cast   4. Dispo: d/c home today after AM vanco dose  5. per case management, home infusion for PICC set up   6. appreciate wound care recommendations     Ortho Pager [phone] Orthopaedic Surgery Progress Note    64M s/p total knee replacement on /2019 c/b infection s/p polyexchange in 11/2019, with continued wound complication     Subjective:     Patient seen and examined while RN doing dressing change. Patient comfortable without complaints, pain controlled. Patient is eager to be discharged today.       Objective:    Vital Signs Last 24 Hrs  T(C): 36.7 (12-27-19 @ 08:45), Max: 36.7 (12-27-19 @ 08:45)  T(F): 98.1 (12-27-19 @ 08:45), Max: 98.1 (12-27-19 @ 08:45)  HR: 75 (12-27-19 @ 08:45) (75 - 78)  BP: 123/69 (12-27-19 @ 08:45) (123/69 - 126/70)  BP(mean): --  RR: 17 (12-27-19 @ 08:45) (17 - 18)  SpO2: 97% (12-27-19 @ 08:45) (96% - 97%)  AVSS    PE:  General: Patient alert and oriented, NAD  RLE in cast with window for wound   Skin warm and well perfused  wet to dry dressing with collagenase applied to wound                                10.7   7.49  )-----------( 375      ( 27 Dec 2019 08:26 )             36.9   27 Dec 2019 08:26    132    |  95     |  25     ----------------------------<  159    4.1     |  26     |  0.98     Ca    8.6        27 Dec 2019 08:26    TPro  6.9    /  Alb  3.4    /  TBili  0.7    /  DBili  x      /  AST  24     /  ALT  29     /  AlkPhos  84     27 Dec 2019 08:26      A/P:   64M s/p total knee replacement on /2019 c/b infection s/p polyexchange in 11/2019, with continued wound complication   1. Pain control as needed  2. DVT prophylaxis: effient (home medication)   3. PT, weight-bearing status:  WBAT in cast   4. Dispo: d/c home today after AM vanco dose  5. per case management, home infusion for PICC set up   6. appreciate wound care recommendations   7. Per Dr. Rivera (cardiology MD) note, cardiac status stable for d/c     Ortho Pager [phone] Orthopaedic Surgery Progress Note    64M s/p total knee replacement on /2019 c/b infection s/p polyexchange in 11/2019, with continued wound complication     Subjective:     Patient seen and examined while RN doing dressing change. Patient comfortable without complaints, pain controlled. Patient is eager to be discharged today.       Objective:    Vital Signs Last 24 Hrs  T(C): 36.7 (12-27-19 @ 08:45), Max: 36.7 (12-27-19 @ 08:45)  T(F): 98.1 (12-27-19 @ 08:45), Max: 98.1 (12-27-19 @ 08:45)  HR: 75 (12-27-19 @ 08:45) (75 - 78)  BP: 123/69 (12-27-19 @ 08:45) (123/69 - 126/70)  BP(mean): --  RR: 17 (12-27-19 @ 08:45) (17 - 18)  SpO2: 97% (12-27-19 @ 08:45) (96% - 97%)  AVSS    PE:  General: Patient alert and oriented, NAD  RLE in cast with window for wound   Skin warm and well perfused  wet to dry dressing with collagenase applied to wound                                10.7   7.49  )-----------( 375      ( 27 Dec 2019 08:26 )             36.9   27 Dec 2019 08:26    132    |  95     |  25     ----------------------------<  159    4.1     |  26     |  0.98     Ca    8.6        27 Dec 2019 08:26    TPro  6.9    /  Alb  3.4    /  TBili  0.7    /  DBili  x      /  AST  24     /  ALT  29     /  AlkPhos  84     27 Dec 2019 08:26      A/P:   64M s/p total knee replacement on /2019 c/b infection s/p polyexchange in 11/2019, with continued wound complication   1. Pain control as needed  2. DVT prophylaxis: effient (home medication)   3. PT, weight-bearing status:  WBAT in cast   4. Dispo: d/c home today after AM vanco dose  5. per case management, home infusion for PICC set up   6. appreciate wound care recommendations   7. Per Dr. Rivera (cardiology MD) note, cardiac status stable for d/c     addendum:  per [place] from physical therapy, patient is independent in ambulation and safe for discharge; patient requesting home PT     Ortho Pager [phone]

## 2019-12-27 NOTE — PHYSICAL THERAPY INITIAL EVALUATION ADULT - LEVEL OF INDEPENDENCE: STAIR NEGOTIATION, REHAB EVAL
Patient unwilling to demo, reports "I can climb the stairs, I've been climbing stairs like this for months."

## 2019-12-27 NOTE — PROGRESS NOTE ADULT - SUBJECTIVE AND OBJECTIVE BOX
Ortho Note    Pt comfortable without complaints, pain controlled  Denies CP, SOB, N/V, numbness/tingling     Vital Signs Last 24 Hrs  T(C): 36.3 (12-27-19 @ 05:28), Max: 36.3 (12-27-19 @ 05:28)  T(F): 97.4 (12-27-19 @ 05:28), Max: 97.4 (12-27-19 @ 05:28)  HR: 78 (12-27-19 @ 05:28) (78 - 78)  BP: 126/70 (12-27-19 @ 05:28) (126/70 - 126/70)  BP(mean): --  RR: 18 (12-27-19 @ 05:28) (18 - 18)  SpO2: 96% (12-27-19 @ 05:28) (96% - 96%)  AVSS    General: Pt Alert and oriented, NAD  LLE in long leg cast with window around wound site  Wound C/D/I  Sensation intact s/s/sp/dp/t   Motor: EHL/FHL/TA/GS 5/5  2+ DP pulse   Toes warm/perfused                           11.3   7.60  )-----------( 375      ( 26 Dec 2019 10:55 )             36.7   26 Dec 2019 10:55    133    |  95     |  33     ----------------------------<  116    4.1     |  28     |  1.12           A/P: 64yMale s/p R TKA 10/2019 c/b infection treated w I&D and poly exchange 11/2019 admitted 2/2 to necrosis of incision site  - Stable  - Pain Control  - DVT ppx: effient  - PT, WBS: WBAT in cast  - appreciate ID, endo, plastics, cards and med recs  - vanc dose this AM  - dispo home today     Ortho Pager [phone] baseline

## 2019-12-30 DIAGNOSIS — T84.53XA INFECTION AND INFLAMMATORY REACTION DUE TO INTERNAL RIGHT KNEE PROSTHESIS, INITIAL ENCOUNTER: ICD-10-CM

## 2019-12-30 DIAGNOSIS — K90.0 CELIAC DISEASE: ICD-10-CM

## 2019-12-30 DIAGNOSIS — E11.319 TYPE 2 DIABETES MELLITUS WITH UNSPECIFIED DIABETIC RETINOPATHY WITHOUT MACULAR EDEMA: ICD-10-CM

## 2019-12-30 DIAGNOSIS — I25.10 ATHEROSCLEROTIC HEART DISEASE OF NATIVE CORONARY ARTERY WITHOUT ANGINA PECTORIS: ICD-10-CM

## 2019-12-30 DIAGNOSIS — E78.5 HYPERLIPIDEMIA, UNSPECIFIED: ICD-10-CM

## 2019-12-30 DIAGNOSIS — T81.31XA DISRUPTION OF EXTERNAL OPERATION (SURGICAL) WOUND, NOT ELSEWHERE CLASSIFIED, INITIAL ENCOUNTER: ICD-10-CM

## 2019-12-30 DIAGNOSIS — I50.22 CHRONIC SYSTOLIC (CONGESTIVE) HEART FAILURE: ICD-10-CM

## 2019-12-30 DIAGNOSIS — Z60.4 SOCIAL EXCLUSION AND REJECTION: ICD-10-CM

## 2019-12-30 DIAGNOSIS — Z95.1 PRESENCE OF AORTOCORONARY BYPASS GRAFT: ICD-10-CM

## 2019-12-30 DIAGNOSIS — I11.0 HYPERTENSIVE HEART DISEASE WITH HEART FAILURE: ICD-10-CM

## 2019-12-30 DIAGNOSIS — I25.2 OLD MYOCARDIAL INFARCTION: ICD-10-CM

## 2019-12-30 DIAGNOSIS — M17.11 UNILATERAL PRIMARY OSTEOARTHRITIS, RIGHT KNEE: ICD-10-CM

## 2019-12-30 DIAGNOSIS — Z96.651 PRESENCE OF RIGHT ARTIFICIAL KNEE JOINT: ICD-10-CM

## 2019-12-30 DIAGNOSIS — E11.40 TYPE 2 DIABETES MELLITUS WITH DIABETIC NEUROPATHY, UNSPECIFIED: ICD-10-CM
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
## 2019-12-30 DIAGNOSIS — M25.569 PAIN IN UNSPECIFIED KNEE: ICD-10-CM

## 2019-12-30 SDOH — SOCIAL STABILITY - SOCIAL INSECURITY: SOCIAL EXCLUSION AND REJECTION: Z60.4

## 2019-12-31 ENCOUNTER — APPOINTMENT (OUTPATIENT)
Dept: PLASTIC SURGERY | Facility: CLINIC | Age: 64
End: 2019-12-31
Payer: COMMERCIAL

## 2019-12-31 VITALS
OXYGEN SATURATION: 98 % | SYSTOLIC BLOOD PRESSURE: 114 MMHG | TEMPERATURE: 98.6 F | DIASTOLIC BLOOD PRESSURE: 78 MMHG | HEIGHT: 73 IN | HEART RATE: 96 BPM

## 2019-12-31 VITALS — BODY MASS INDEX: 29.69 KG/M2 | WEIGHT: 225 LBS

## 2019-12-31 DIAGNOSIS — S81.001A UNSPECIFIED OPEN WOUND, RIGHT KNEE, INITIAL ENCOUNTER: ICD-10-CM

## 2019-12-31 PROCEDURE — 99203 OFFICE O/P NEW LOW 30 MIN: CPT

## 2019-12-31 RX ORDER — ASPIRIN 81 MG
81 TABLET, DELAYED RELEASE (ENTERIC COATED) ORAL DAILY
Qty: 90 | Refills: 0 | Status: DISCONTINUED | COMMUNITY
End: 2019-12-31

## 2019-12-31 RX ORDER — OXYCODONE AND ACETAMINOPHEN 5; 325 MG/1; MG/1
5-325 TABLET ORAL
Qty: 50 | Refills: 0 | Status: DISCONTINUED | COMMUNITY
Start: 2019-10-21 | End: 2019-12-31

## 2019-12-31 NOTE — HISTORY OF PRESENT ILLNESS
[FreeTextEntry1] : 63 yo male with DM2 (insulin dependent) referred by Dr. Estes for delayed wound healing, he is s/p right knee replacement on 10/22/19; course c/b infection and skin edge dehiscence. the current wound has been stable for the past month. He is currently being cared for by the [facility] wound care specialists.  and has a PICC line for rifampin, managed by Dr Saeed (ID). He is currently using collagenase and also has a wound care nurse for assist with management at home. \par \par

## 2019-12-31 NOTE — PHYSICAL EXAM
[de-identified] : Right leg is in a long leg fiberglass cast with a window opened at the anterior knee for wound care. He has 5cm length of delayed helaing, dehiscence, with intervening eschar and fibrinous debris. the wound is dry, there is no purulence the surrounding skin is hyperemic but does not appear cellulitic, no fluctuance. No exposed hardware.

## 2019-12-31 NOTE — ASSESSMENT
[FreeTextEntry1] : 63 yo male with insuling deopendent DM, prior history of lower extremity wound healing complications now with delayed healing right TKA wound stable, no active infection or collection or exposed underlying hardware. He will need local wound care in order to develop granulation tissue base for healing by secondary intention or possibly delayed reconstruction with skin graft or local flap. Will refer to Dr. Zavala for wound care evaluation. He has been under the care of Dr. zavala in the past for his prior foot, toe wounds.

## 2020-01-09 LAB
CRP SERPL-MCNC: 3.63 MG/DL
CRP SERPL-MCNC: 5.08 MG/DL
ERYTHROCYTE [SEDIMENTATION RATE] IN BLOOD BY WESTERGREN METHOD: 20 MM/HR
ERYTHROCYTE [SEDIMENTATION RATE] IN BLOOD BY WESTERGREN METHOD: 58 MM/HR

## 2020-01-12 ENCOUNTER — EMERGENCY (EMERGENCY)
Facility: HOSPITAL | Age: 65
LOS: 0 days | Discharge: HOME | End: 2020-01-12
Attending: EMERGENCY MEDICINE | Admitting: EMERGENCY MEDICINE
Payer: COMMERCIAL

## 2020-01-12 VITALS
SYSTOLIC BLOOD PRESSURE: 129 MMHG | HEART RATE: 96 BPM | DIASTOLIC BLOOD PRESSURE: 86 MMHG | RESPIRATION RATE: 18 BRPM | WEIGHT: 216.05 LBS | OXYGEN SATURATION: 99 % | TEMPERATURE: 98 F

## 2020-01-12 VITALS
RESPIRATION RATE: 18 BRPM | SYSTOLIC BLOOD PRESSURE: 115 MMHG | TEMPERATURE: 97 F | DIASTOLIC BLOOD PRESSURE: 78 MMHG | HEART RATE: 94 BPM | OXYGEN SATURATION: 99 %

## 2020-01-12 DIAGNOSIS — L29.9 PRURITUS, UNSPECIFIED: ICD-10-CM

## 2020-01-12 DIAGNOSIS — Z95.1 PRESENCE OF AORTOCORONARY BYPASS GRAFT: Chronic | ICD-10-CM

## 2020-01-12 DIAGNOSIS — L53.9 ERYTHEMATOUS CONDITION, UNSPECIFIED: ICD-10-CM

## 2020-01-12 DIAGNOSIS — R21 RASH AND OTHER NONSPECIFIC SKIN ERUPTION: ICD-10-CM

## 2020-01-12 DIAGNOSIS — Z96.651 PRESENCE OF RIGHT ARTIFICIAL KNEE JOINT: Chronic | ICD-10-CM

## 2020-01-12 DIAGNOSIS — Z88.8 ALLERGY STATUS TO OTHER DRUGS, MEDICAMENTS AND BIOLOGICAL SUBSTANCES STATUS: ICD-10-CM

## 2020-01-12 DIAGNOSIS — Z95.5 PRESENCE OF CORONARY ANGIOPLASTY IMPLANT AND GRAFT: Chronic | ICD-10-CM

## 2020-01-12 PROCEDURE — 99283 EMERGENCY DEPT VISIT LOW MDM: CPT

## 2020-01-12 NOTE — ED ADULT NURSE NOTE - PMH
Atherosclerosis of coronary artery  CAD (coronary artery disease)  Blood clot due to device, implant, or graft  was on blood thinners  CHF (congestive heart failure)    Chronic systolic CHF (congestive heart failure)    Diabetes    History of celiac disease    HLD (hyperlipidemia)    HTN (hypertension)    MRSA bacteremia    Osteoarthritis    Status post percutaneous transluminal coronary angioplasty  in 2012

## 2020-01-12 NOTE — ED PROVIDER NOTE - CLINICAL SUMMARY MEDICAL DECISION MAKING FREE TEXT BOX
shared decision making done with pt about need for steroid. unclear etiology of redness, no sign of infection. pt advised about rate related infusion of vanco, and potential worsening of healing with steroids. pt agreed, will f/u as outpt. continue with benadryl

## 2020-01-12 NOTE — ED PROVIDER NOTE - OBJECTIVE STATEMENT
63 yo M hx of DM, CAD with stents, CABG, right knee sx c/o rash/redness to arms and legs since yesterday. Patient is currently on Vancomycin IV and Rifampin PO for infected right knee sx/wound dehiscence. Last dose of abx was yesterday afternoon. Yesterday developed redness and itching to extremitis. Taking Benadryl, last dose today. No throat, tongue or lip swelling. No SOB or n/v.

## 2020-01-12 NOTE — ED ADULT NURSE NOTE - PSH
Other postprocedural status  Fixation hardware in foot LEFT  S/P CABG x 1  2018  S/P TKR (total knee replacement), right  with infection Mrsa  Stented coronary artery  10/18 heart attack  INFERIOR WALL MI

## 2020-01-12 NOTE — ED PROVIDER NOTE - PHYSICAL EXAMINATION
Gen: Alert, NAD, well appearing  Head: NC, AT, PERRL, EOMI, normal lids/conjunctiva  ENT: normal hearing, patent oropharynx . No swelling to lip, throat or tongue  Neck: +supple, no tenderness/meningismus,  Pulm: Bilateral BS, normal resp effort, no wheeze/stridor/retractions  Mskel: no edema/erythema/cyanosis  Skin: +wound dehiscence to right knee. + redness to distal extremities ( mid forearms to hands and mid shins to feet). warm/dry  Neuro: AAOx3, no sensory/motor deficits

## 2020-01-12 NOTE — ED ADULT TRIAGE NOTE - CHIEF COMPLAINT QUOTE
Patient states "I have a rash started yesterday on my hands it itches and its going now to my elbow, I took 50 of benadryl at 7 am".

## 2020-01-12 NOTE — ED PROVIDER NOTE - ATTENDING CONTRIBUTION TO CARE
64yr old male hx as above, on  vanco/rifampin via right arm picc for knee infection/wound dehiscence.  pt here for redness to bl arms and legs. no fever, chills. + itching, and pt has been taking benadryl for it. agree with documented exam. in addition, pt with normal speech,

## 2020-01-12 NOTE — ED PROVIDER NOTE - NS ED ROS FT
Review of Systems    Constitutional: (-) fever, (-) chills  Eyes/ENT: (-) blurry vision, (-) epistaxis, (-) sore throat  Cardiovascular: (-) chest pain, (-) syncope  Respiratory: (-) cough, (-) shortness of breath  Gastrointestinal: (-) pain, (-) nausea, (-) vomiting, (-) diarrhea  Musculoskeletal: (-) neck pain, (-) back pain, (-) joint pain  Integumentary: (-) rash, (-) edema, (+) redness to arms/legs, (+) infected right knee wound  Neurological: (-) headache, (-) altered mental status  Allergic/Immunologic: (+) pruritus

## 2020-01-13 ENCOUNTER — APPOINTMENT (OUTPATIENT)
Dept: VASCULAR SURGERY | Facility: CLINIC | Age: 65
End: 2020-01-13
Payer: COMMERCIAL

## 2020-01-13 DIAGNOSIS — T81.31XA DISRUPTION OF EXTERNAL OPERATION (SURGICAL) WOUND, NOT ELSEWHERE CLASSIFIED, INITIAL ENCOUNTER: ICD-10-CM

## 2020-01-13 DIAGNOSIS — G89.18 OTHER ACUTE POSTPROCEDURAL PAIN: ICD-10-CM

## 2020-01-13 PROCEDURE — 99214 OFFICE O/P EST MOD 30 MIN: CPT

## 2020-01-14 NOTE — ASSESSMENT
[Arterial/Venous Disease] : arterial/venous disease [FreeTextEntry1] : 64 M w/ hx of HTN, HLD, CHF w/ EF of 25%, DM w/ insulin pump, CAD s/p PCI in 2013, and CABG in 2018, with RLE anterior knee wound dehiscence which developed during physical therapy s/p R total knee replacement. On exam, no drainage or bleeding noted to the wound. No signs of infection noted. Given the state of the wound, it is unlikely to heal on its own. I discussed surgical plans with the patient including surgical debridement of the RLE wound, followed by a wound vac and a skin graft to close the wound. I spoke to  about patient plans and he agrees. The surgical approach, risks, benefits, and alternatives to the proposed procedure were discussed with the patient and all questions were answered to their satisfaction. Pre and Post-operative instructions were provided to the patient. Patient understands and agrees to undergo the proposed procedure. Patient will be scheduled for this.

## 2020-01-14 NOTE — ADDENDUM
[FreeTextEntry1] : Documented by Lc Odonnell acting as a scribe for Dr. Noel Malloy on 01/13/2020\par

## 2020-01-14 NOTE — HISTORY OF PRESENT ILLNESS
[FreeTextEntry1] : 64 M w/ hx of HTN, HLD, CHF w/ EF of 25%, DM w/ insulin pump, CAD s/p PCI in 2013, and CABG in 2018, presents today w/ complaints of an open wound to the anterior RLE knee. Patient reports that the wound has been present for about 3 months now. He initially underwent total knee replacement with  and was sent for physical therapy after. He states that during the period when he was receiving physical therapy, he developed a wound dehiscence.  has attempted treating the wound with a cast with no improvement. He adds that the wound was worse prior and is better today but it is still open. No drainage, purulence or bleeding. He has tired treating it with Santyl with minimal relief/improvement to the wound. Denies recent fevers, chills, rigors, claudication or rest pain.\par \par \par Patient works as an EMT.

## 2020-01-14 NOTE — PHYSICAL EXAM
[Normal Breath Sounds] : Normal breath sounds [Normal Heart Sounds] : normal heart sounds [Oriented to Person] : oriented to person [Alert] : alert [Oriented to Time] : oriented to time [Oriented to Place] : oriented to place [Calm] : calm [Varicose Veins Of Lower Extremities] : present [JVD] : no jugular venous distention  [Ankle Swelling (On Exam)] : not present [Carotid Bruits] : no carotid bruits [Abdomen Masses] : No abdominal masses [] : not present [de-identified] : NAD, pleasant [Abdomen Tenderness] : ~T ~M No abdominal tenderness [de-identified] : NCAT [de-identified] : +FROM B/L [de-identified] : open ulcers in 1 focal area on anterior shin of RLE, w/ minimal surrounding erythema, and no drainage.

## 2020-01-14 NOTE — END OF VISIT
[FreeTextEntry3] : All medical record entries made by the Scribe were at my, Dr. Malloy's direction and personally dictated by me on 01/13/2020 . I have reviewed the chart and agree that the record accurately reflects my personal performance of the history, physical exam, assessment and plan. I have also personally directed, reviewed, and agreed with the chart.\par

## 2020-01-15 VITALS
OXYGEN SATURATION: 100 % | HEIGHT: 73 IN | WEIGHT: 230.38 LBS | DIASTOLIC BLOOD PRESSURE: 68 MMHG | HEART RATE: 96 BPM | RESPIRATION RATE: 18 BRPM | TEMPERATURE: 98 F | SYSTOLIC BLOOD PRESSURE: 110 MMHG

## 2020-01-15 RX ORDER — DULAGLUTIDE 4.5 MG/.5ML
0 INJECTION, SOLUTION SUBCUTANEOUS
Qty: 0 | Refills: 0 | DISCHARGE

## 2020-01-15 RX ORDER — DULOXETINE HYDROCHLORIDE 30 MG/1
90 CAPSULE, DELAYED RELEASE ORAL
Qty: 0 | Refills: 0 | DISCHARGE

## 2020-01-15 RX ORDER — EMPAGLIFLOZIN 10 MG/1
1 TABLET, FILM COATED ORAL
Qty: 0 | Refills: 0 | DISCHARGE

## 2020-01-15 RX ORDER — METOPROLOL TARTRATE 50 MG
1 TABLET ORAL
Qty: 0 | Refills: 0 | DISCHARGE

## 2020-01-15 RX ORDER — ROSUVASTATIN CALCIUM 5 MG/1
1 TABLET ORAL
Qty: 0 | Refills: 0 | DISCHARGE

## 2020-01-15 NOTE — ASU PATIENT PROFILE, ADULT - PSH
Other postprocedural status  Fixation hardware in foot LEFT  S/P CABG x 1  2018  S/P TKR (total knee replacement), right  with infection Mrsa  Stented coronary artery  10/18 heart attack  INFERIOR WALL MI  Surgery, elective  right knee wound debridement Other postprocedural status  Fixation hardware in foot LEFT  S/P CABG x 1  2018  S/P TKR (total knee replacement), right  with infection Mrsa  per pt he was cleared from MRSA infection  Stented coronary artery  10/18 heart attack  INFERIOR WALL MI  Surgery, elective  right knee wound debridement Other postprocedural status  Fixation hardware in foot LEFT  S/P CABG x 1  2018  S/P TKR (total knee replacement), right  with infection Mrsa   per pt he was cleared from MRSA infection  Stented coronary artery  10/18 heart attack  INFERIOR WALL MI  Surgery, elective  Right shoulder  Surgery, elective  right knee wound debridement

## 2020-01-15 NOTE — ASU PATIENT PROFILE, ADULT - PMH
Atherosclerosis of coronary artery  CAD (coronary artery disease)  Blood clot due to device, implant, or graft  was on blood thinners  CHF (congestive heart failure)    Diabetes    Diabetic neuropathy    Diverticulitis    History of celiac disease    HLD (hyperlipidemia)    HTN (hypertension)    MRSA bacteremia    Osteoarthritis    Status post percutaneous transluminal coronary angioplasty  in 2012  STEMI (ST elevation myocardial infarction)

## 2020-01-15 NOTE — ASU PATIENT PROFILE, ADULT - --DESCRIBE SURGICAL SITE
right knee TKR done 11.21.2019, non healing wound, covered with guaze dressing. will check site DOS right knee TKR done 11.21.2019, non healing wound, covered with guaze dressing. will check site DOS , right heel ulcer per patient from procedure due to cast put on it

## 2020-01-16 ENCOUNTER — APPOINTMENT (OUTPATIENT)
Dept: VASCULAR SURGERY | Facility: HOSPITAL | Age: 65
End: 2020-01-16

## 2020-01-16 ENCOUNTER — RESULT REVIEW (OUTPATIENT)
Age: 65
End: 2020-01-16

## 2020-01-16 ENCOUNTER — OUTPATIENT (OUTPATIENT)
Dept: OUTPATIENT SERVICES | Facility: HOSPITAL | Age: 65
LOS: 1 days | Discharge: HOME | End: 2020-01-16
Payer: COMMERCIAL

## 2020-01-16 DIAGNOSIS — T81.30XA DISRUPTION OF WOUND, UNSPECIFIED, INITIAL ENCOUNTER: ICD-10-CM

## 2020-01-16 DIAGNOSIS — Z95.1 PRESENCE OF AORTOCORONARY BYPASS GRAFT: Chronic | ICD-10-CM

## 2020-01-16 DIAGNOSIS — Z41.9 ENCOUNTER FOR PROCEDURE FOR PURPOSES OTHER THAN REMEDYING HEALTH STATE, UNSPECIFIED: Chronic | ICD-10-CM

## 2020-01-16 DIAGNOSIS — Z95.5 PRESENCE OF CORONARY ANGIOPLASTY IMPLANT AND GRAFT: Chronic | ICD-10-CM

## 2020-01-16 DIAGNOSIS — Z96.651 PRESENCE OF RIGHT ARTIFICIAL KNEE JOINT: Chronic | ICD-10-CM

## 2020-01-16 LAB
GLUCOSE BLDC GLUCOMTR-MCNC: 206 MG/DL — HIGH (ref 70–99)
GLUCOSE BLDC GLUCOMTR-MCNC: 324 MG/DL — HIGH (ref 70–99)
GLUCOSE BLDC GLUCOMTR-MCNC: 75 MG/DL — SIGNIFICANT CHANGE UP (ref 70–99)

## 2020-01-16 PROCEDURE — 88304 TISSUE EXAM BY PATHOLOGIST: CPT | Mod: 26

## 2020-01-16 PROCEDURE — 97605 NEG PRS WND THER DME<=50SQCM: CPT | Mod: GC

## 2020-01-16 PROCEDURE — 11043 DBRDMT MUSC&/FSCA 1ST 20/<: CPT | Mod: GC

## 2020-01-16 RX ORDER — DEXTROSE 50 % IN WATER 50 %
25 SYRINGE (ML) INTRAVENOUS ONCE
Refills: 0 | Status: DISCONTINUED | OUTPATIENT
Start: 2020-01-16 | End: 2020-01-18

## 2020-01-16 RX ORDER — SODIUM CHLORIDE 9 MG/ML
1000 INJECTION, SOLUTION INTRAVENOUS
Refills: 0 | Status: DISCONTINUED | OUTPATIENT
Start: 2020-01-16 | End: 2020-01-18

## 2020-01-16 RX ORDER — DULOXETINE HYDROCHLORIDE 30 MG/1
30 CAPSULE, DELAYED RELEASE ORAL DAILY
Refills: 0 | Status: DISCONTINUED | OUTPATIENT
Start: 2020-01-16 | End: 2020-01-17

## 2020-01-16 RX ORDER — GLUCAGON INJECTION, SOLUTION 0.5 MG/.1ML
1 INJECTION, SOLUTION SUBCUTANEOUS ONCE
Refills: 0 | Status: DISCONTINUED | OUTPATIENT
Start: 2020-01-16 | End: 2020-01-18

## 2020-01-16 RX ORDER — METOPROLOL TARTRATE 50 MG
100 TABLET ORAL DAILY
Refills: 0 | Status: DISCONTINUED | OUTPATIENT
Start: 2020-01-16 | End: 2020-01-18

## 2020-01-16 RX ORDER — DIGOXIN 250 MCG
0.12 TABLET ORAL DAILY
Refills: 0 | Status: DISCONTINUED | OUTPATIENT
Start: 2020-01-16 | End: 2020-01-18

## 2020-01-16 RX ORDER — OXYCODONE AND ACETAMINOPHEN 5; 325 MG/1; MG/1
1 TABLET ORAL EVERY 4 HOURS
Refills: 0 | Status: DISCONTINUED | OUTPATIENT
Start: 2020-01-16 | End: 2020-01-18

## 2020-01-16 RX ORDER — ONDANSETRON 8 MG/1
4 TABLET, FILM COATED ORAL EVERY 6 HOURS
Refills: 0 | Status: DISCONTINUED | OUTPATIENT
Start: 2020-01-16 | End: 2020-01-18

## 2020-01-16 RX ORDER — PANTOPRAZOLE SODIUM 20 MG/1
40 TABLET, DELAYED RELEASE ORAL
Refills: 0 | Status: DISCONTINUED | OUTPATIENT
Start: 2020-01-16 | End: 2020-01-18

## 2020-01-16 RX ORDER — ASPIRIN/CALCIUM CARB/MAGNESIUM 324 MG
81 TABLET ORAL DAILY
Refills: 0 | Status: DISCONTINUED | OUTPATIENT
Start: 2020-01-16 | End: 2020-01-18

## 2020-01-16 RX ORDER — ONDANSETRON 8 MG/1
4 TABLET, FILM COATED ORAL ONCE
Refills: 0 | Status: COMPLETED | OUTPATIENT
Start: 2020-01-16 | End: 2020-01-16

## 2020-01-16 RX ORDER — DEXTROSE 50 % IN WATER 50 %
12.5 SYRINGE (ML) INTRAVENOUS ONCE
Refills: 0 | Status: DISCONTINUED | OUTPATIENT
Start: 2020-01-16 | End: 2020-01-18

## 2020-01-16 RX ORDER — PRASUGREL 5 MG/1
10 TABLET, FILM COATED ORAL DAILY
Refills: 0 | Status: DISCONTINUED | OUTPATIENT
Start: 2020-01-16 | End: 2020-01-18

## 2020-01-16 RX ORDER — HYDROMORPHONE HYDROCHLORIDE 2 MG/ML
0.25 INJECTION INTRAMUSCULAR; INTRAVENOUS; SUBCUTANEOUS EVERY 4 HOURS
Refills: 0 | Status: DISCONTINUED | OUTPATIENT
Start: 2020-01-16 | End: 2020-01-17

## 2020-01-16 RX ORDER — INSULIN ASPART 100 [IU]/ML
1 INJECTION, SOLUTION SUBCUTANEOUS
Refills: 0 | Status: DISCONTINUED | OUTPATIENT
Start: 2020-01-16 | End: 2020-01-17

## 2020-01-16 RX ORDER — SODIUM CHLORIDE 9 MG/ML
1000 INJECTION INTRAMUSCULAR; INTRAVENOUS; SUBCUTANEOUS
Refills: 0 | Status: DISCONTINUED | OUTPATIENT
Start: 2020-01-16 | End: 2020-01-16

## 2020-01-16 RX ORDER — DEXTROSE 50 % IN WATER 50 %
15 SYRINGE (ML) INTRAVENOUS ONCE
Refills: 0 | Status: DISCONTINUED | OUTPATIENT
Start: 2020-01-16 | End: 2020-01-18

## 2020-01-16 RX ORDER — HYDROMORPHONE HYDROCHLORIDE 2 MG/ML
0.25 INJECTION INTRAMUSCULAR; INTRAVENOUS; SUBCUTANEOUS
Refills: 0 | Status: DISCONTINUED | OUTPATIENT
Start: 2020-01-16 | End: 2020-01-16

## 2020-01-16 RX ORDER — ATORVASTATIN CALCIUM 80 MG/1
40 TABLET, FILM COATED ORAL AT BEDTIME
Refills: 0 | Status: DISCONTINUED | OUTPATIENT
Start: 2020-01-16 | End: 2020-01-18

## 2020-01-16 RX ADMIN — HYDROMORPHONE HYDROCHLORIDE 0.25 MILLIGRAM(S): 2 INJECTION INTRAMUSCULAR; INTRAVENOUS; SUBCUTANEOUS at 14:26

## 2020-01-16 RX ADMIN — HYDROMORPHONE HYDROCHLORIDE 0.25 MILLIGRAM(S): 2 INJECTION INTRAMUSCULAR; INTRAVENOUS; SUBCUTANEOUS at 20:39

## 2020-01-16 RX ADMIN — SODIUM CHLORIDE 30 MILLILITER(S): 9 INJECTION INTRAMUSCULAR; INTRAVENOUS; SUBCUTANEOUS at 15:01

## 2020-01-16 RX ADMIN — HYDROMORPHONE HYDROCHLORIDE 0.25 MILLIGRAM(S): 2 INJECTION INTRAMUSCULAR; INTRAVENOUS; SUBCUTANEOUS at 13:41

## 2020-01-16 RX ADMIN — HYDROMORPHONE HYDROCHLORIDE 0.25 MILLIGRAM(S): 2 INJECTION INTRAMUSCULAR; INTRAVENOUS; SUBCUTANEOUS at 14:50

## 2020-01-16 RX ADMIN — ONDANSETRON 4 MILLIGRAM(S): 8 TABLET, FILM COATED ORAL at 21:02

## 2020-01-16 RX ADMIN — ATORVASTATIN CALCIUM 40 MILLIGRAM(S): 80 TABLET, FILM COATED ORAL at 21:48

## 2020-01-16 RX ADMIN — HYDROMORPHONE HYDROCHLORIDE 0.25 MILLIGRAM(S): 2 INJECTION INTRAMUSCULAR; INTRAVENOUS; SUBCUTANEOUS at 21:55

## 2020-01-16 RX ADMIN — Medication 100 MILLIGRAM(S): at 19:34

## 2020-01-16 NOTE — CONSULT NOTE ADULT - SUBJECTIVE AND OBJECTIVE BOX
HPI: 64yMale    Age at Dx:  How dx:  Hx and duration of insulin:  Current Therapy:  Hx of hypoglycemia  Hx of DKA/HHS?    Home FSG:  Fasting  Lunch  Dinner  Bed    Hx of other regimens  Complications:  Outpatient Endo:    PMH & Surgical Hx:T81.32XA  Family history of heart disease (Mother)  Family history of heart disease (Mother)  Handoff  Diabetic neuropathy  STEMI (ST elevation myocardial infarction)  Diverticulitis  MRSA bacteremia  History of celiac disease  CHF (congestive heart failure)  HTN (hypertension)  Diabetes  Blood clot due to device, implant, or graft  CKD (chronic kidney disease) stage 2, GFR 60-89 ml/min  COPD, moderate  Hyperkalemia  HLD (hyperlipidemia)  Chronic systolic CHF (congestive heart failure)  Osteoarthritis  HTN (hypertension)  Type 2 diabetes mellitus with neurological manifestations  Type 2 diabetes mellitus  History of surgery to heart and great vessels, presenting hazards to health  Atherosclerosis of coronary artery  Status post percutaneous transluminal coronary angioplasty  CHF (congestive heart failure)  HTN (hypertension)  Wound dehiscence  Wound dehiscence  Selective debridement of wound  Surgery, elective  Surgery, elective  S/P TKR (total knee replacement), right  S/P CABG x 1  Stented coronary artery  History of surgery to major organs, presenting hazards to health  Other postprocedural status      FH:  DM:  Thyroid:  Autoimmune:  Other:    SH:  Smoking  Etoh:  Recreational Drugs:  Social Life:    Current Meds:  aspirin enteric coated 81 milliGRAM(s) Oral daily  atorvastatin 40 milliGRAM(s) Oral at bedtime  digoxin     Tablet 0.125 milliGRAM(s) Oral daily  DULoxetine 30 milliGRAM(s) Oral daily  HYDROmorphone  Injectable 0.25 milliGRAM(s) IV Push every 30 minutes  HYDROmorphone  Injectable 0.25 milliGRAM(s) IV Push every 4 hours PRN  metoprolol succinate  milliGRAM(s) Oral daily  oxycodone    5 mG/acetaminophen 325 mG 1 Tablet(s) Oral every 4 hours PRN  pantoprazole    Tablet 40 milliGRAM(s) Oral before breakfast  prasugrel 10 milliGRAM(s) Oral daily  sodium chloride 0.9%. 1000 milliLiter(s) IV Continuous <Continuous>      Allergies:  ACE inhibitors (Hives)  enalapril (Hives)  rifampin (Angioedema)  vancomycin (Angioedema)      ROS:  Denies the following except as indicated.    General: weight loss/weight gain, decreased appetite, fatigue  Eyes: Blurry vision, double vision, visual changes  ENT: Throat pain, changes in voice,   CV: palpitations, SOB, CP, cough  GI: NVD, difficulty swallowing, abdominal pain  : polyuria, dysuria  Endo: abnormal menses, temperature intolerance, decreased libido  MSK: weakness, joint pain  Skin: rash, dryness, diaphoresis  Heme: Easy bruising,bleeding  Neuro: HA, dizziness, lightheadedness, numbness tingling  Psych: Anxiety, Depression    Vital Signs Last 24 Hrs  T(C): --  T(F): --  HR: --  BP: --  BP(mean): --  RR: --  SpO2: --  Height (cm): 185.42 (01-16 @ 10:58)  Weight (kg): 104.5 (01-16 @ 10:58)  BMI (kg/m2): 30.4 (01-16 @ 10:58)      Constitutional: wn/wd in NAD.   HEENT: NCAT, MMM, OP clear, EOMI, , no proptosis or lid retraction  Neck: no thyromegaly or palpable thyroid nodules   Respiratory: lungs CTAB.  Cardiovascular: regular rhythm, normal S1 and S2, no audible murmurs, no peripheral edema  GI: soft, NT/ND, no masses/HSM appreciated.  Neurology: no tremors, DTR 2+  Skin: no visible rashes/lesions  Psychiatric: AAO x 3, normal affect/mood.  Ext: radial pulses intact, DP pulses intact, extremities warm, no cyanosis, clubbing or edema.       LABS:              Hemoglobin A1C, Whole Blood: 7.5 (10-22 @ 07:00)        RADIOLOGY & ADDITIONAL STUDIES:  CAPILLARY BLOOD GLUCOSE      POCT Blood Glucose.: 324 mg/dL (16 Jan 2020 11:30)        A/P:64y Male    1.  DM  Please continue lantus       units at night / morning.  Please continue lispro      units before each meal.  Please continue lispro moderate / low dose sliding scale four times daily with meals and at bedtime    Pt's fingerstick glucose goal is     Will continue to monitor     For discharge, pt can continue    Pt can follow up at discharge with [facility] by calling  to make an appointment.   Will discuss case with     and update primary team HPI: 64 M with PMHx of CAD, MI in 2018 s/p CABG and PCI (RCA stent with restenosis while on Plavix) on DAPT, DM II, DLD, Celiac, HFrEF s/p Right TKA on 10/21/19 complicated by surgical site infection admitted to [place] - s/p washout and on PICC antibiotics for MRSA (last day 2020) recently dc'd  for chf exacerbation presents after eval w Dr Estes yest for continued orthopedic care and monitoring for his ongoing wound infection.  Pt noted wound dehiscence and purulent drainage starting in Oct w cont infection; currently on rifampin and vancomycin - ID and plastic surgery is following the patient..  Pt notes cont pain similar to prior w/o sig change.  denies any fevers, nausea or vomiting. he was also diagnosed with celiac disease and has been having some diarrhea and some nausea on and off since sep 2019.  Endocrine was consulted for his DM management. He was seen by  over the weekend and was asked to make changes in his insulin pump setting. He went down on his insulin pump settings to basal rate of 2 units/hr and was asked to give 2 units bolus for each meal irrespective of the FSg as his oral intake was very poor. He has insulin supplies for next 3 days at his bedside. He signed his insulin pump policy with  over the weekend. He uses free style storm for CGM.   Basal rate 2 units/hr  FSG & Insulin received:  Yesterday:  pre-dinner fs, 2 nutritional lispro   units  bedtime fs, 1 units lispro SS    Today:  pre-breakfast fs, 2 units lispro SS  pre-lunch fs, 2  units lispro SS  Pre-dinner FSG was 91    Endocrine History  Age at Dx: 30 years ago in his   How dx: routine blood work. Had some polyuria, polydipsia, polyphagia  Hx and duration of insulin: He was on metformin and exenatide before. Has been on insulin for the past 6 years. Has been on insulin pump for the past 5 year  Prior insulin Pump Settings  Basal rate @ 4 units/hr  ICR   1200 AM - 4  1200 Noon 3.5  900 PM - 6    ISF 1:25    Target Blood glucose 100    Current Therapy: on insulin pump - [employer] 670G but patent is using in non-automatic mode - changed 2 days ago, has freestyle storm CGM monitoring - last changed 10 days ago, Jardiance 10mg once daily, Trulicity 1.5mg/once a week - recently for 6 months   Hx of hypoglycemia- has FSG in 60s once or twice a month. happens in the early morning when he takes some juice  Hx of DKA/HHS - denies    Hx of other regimens  Complications: neuropathy, retinopathy  Outpatient Endo:     PMH & Surgical Hx:T81.32XA  Family history of heart disease (Mother)  Family history of heart disease (Mother)  Handoff  Diabetic neuropathy  STEMI (ST elevation myocardial infarction)  Diverticulitis  MRSA bacteremia  History of celiac disease  CHF (congestive heart failure)  HTN (hypertension)  Diabetes  Blood clot due to device, implant, or graft  CKD (chronic kidney disease) stage 2, GFR 60-89 ml/min  COPD, moderate  Hyperkalemia  HLD (hyperlipidemia)  Chronic systolic CHF (congestive heart failure)  Osteoarthritis  HTN (hypertension)  Type 2 diabetes mellitus with neurological manifestations  Type 2 diabetes mellitus  History of surgery to heart and great vessels, presenting hazards to health  Atherosclerosis of coronary artery  Status post percutaneous transluminal coronary angioplasty  CHF (congestive heart failure)  HTN (hypertension)  Wound dehiscence  Wound dehiscence  Selective debridement of wound  Surgery, elective  Surgery, elective  S/P TKR (total knee replacement), right  S/P CABG x 1  Stented coronary artery  History of surgery to major organs, presenting hazards to health  Other postprocedural status      FH:  DM:  Thyroid:  Autoimmune:  Other:    SH:  Smoking  Etoh:  Recreational Drugs:  Social Life:    Current Meds:  aspirin enteric coated 81 milliGRAM(s) Oral daily  atorvastatin 40 milliGRAM(s) Oral at bedtime  digoxin     Tablet 0.125 milliGRAM(s) Oral daily  DULoxetine 30 milliGRAM(s) Oral daily  HYDROmorphone  Injectable 0.25 milliGRAM(s) IV Push every 30 minutes  HYDROmorphone  Injectable 0.25 milliGRAM(s) IV Push every 4 hours PRN  metoprolol succinate  milliGRAM(s) Oral daily  oxycodone    5 mG/acetaminophen 325 mG 1 Tablet(s) Oral every 4 hours PRN  pantoprazole    Tablet 40 milliGRAM(s) Oral before breakfast  prasugrel 10 milliGRAM(s) Oral daily  sodium chloride 0.9%. 1000 milliLiter(s) IV Continuous <Continuous>      Allergies:  ACE inhibitors (Hives)  enalapril (Hives)  rifampin (Angioedema)  vancomycin (Angioedema)      ROS:  Denies the following except as indicated.    General: weight loss/weight gain, decreased appetite, fatigue  Eyes: Blurry vision, double vision, visual changes  ENT: Throat pain, changes in voice,   CV: palpitations, SOB, CP, cough  GI: NVD, difficulty swallowing, abdominal pain  : polyuria, dysuria  Endo: abnormal menses, temperature intolerance, decreased libido  MSK: weakness, joint pain  Skin: rash, dryness, diaphoresis  Heme: Easy bruising,bleeding  Neuro: HA, dizziness, lightheadedness, numbness tingling  Psych: Anxiety, Depression    Vital Signs Last 24 Hrs  T(C): --  T(F): --  HR: --  BP: --  BP(mean): --  RR: --  SpO2: --  Height (cm): 185.42 ( @ 10:58)  Weight (kg): 104.5 ( @ 10:58)  BMI (kg/m2): 30.4 ( @ 10:58)      Constitutional: wn/wd in NAD.   HEENT: NCAT, MMM, OP clear, EOMI, , no proptosis or lid retraction  Neck: no thyromegaly or palpable thyroid nodules   Respiratory: lungs CTAB.  Cardiovascular: regular rhythm, normal S1 and S2, no audible murmurs, no peripheral edema  GI: soft, NT/ND, no masses/HSM appreciated.  Neurology: no tremors, DTR 2+  Skin: no visible rashes/lesions  Psychiatric: AAO x 3, normal affect/mood.  Ext: radial pulses intact, DP pulses intact, extremities warm, no cyanosis, clubbing or edema.       LABS:              Hemoglobin A1C, Whole Blood: 7.5 (10-22 @ 07:00)        RADIOLOGY & ADDITIONAL STUDIES:  CAPILLARY BLOOD GLUCOSE      POCT Blood Glucose.: 324 mg/dL (2020 11:30)        A/P:64y Male    1.  DM  Please continue lantus       units at night / morning.  Please continue lispro      units before each meal.  Please continue lispro moderate / low dose sliding scale four times daily with meals and at bedtime    Pt's fingerstick glucose goal is     Will continue to monitor     For discharge, pt can continue    Pt can follow up at discharge with [facility] by calling  to make an appointment.   Will discuss case with     and update primary team HPI: 64 M with PMHx of CAD, MI in 2018 s/p CABG and PCI (RCA stent with restenosis while on Plavix) on DAPT, DM II, DLD, Celiac, HFrEF s/p Right TKA on 10/21/19 complicated by surgical site infection admitted to [facility] 11/22-11/26 s/p washout and on PICC antibiotics for MRSA (last day 1/6/2020) recently dc'd 12/16 for chf exacerbation. He got his debridement of his right knee with wound vac placement done today. He denies any fevers, nausea or vomiting. He was also diagnosed with celiac disease and has been having some diarrhea and some nausea on and off since sep 2019.  Endocrine was consulted for his DM management. During his last admission, his basal rate was changed in his insulin pump  to 2 units/hr now and was asked to give himself 3 units bolus for each meal  irrespective of the FSG. He said that his FSG has been stable while on this regimen. He has insulin supplies for next 3 days at his bedside. He signed his insulin pump policy. He uses free style storm for CGM. He said that his FSG at home are running at between 140s to 160s. No hypoglycemic events.    Basal rate 2 units/hr  FSG & Insulin received:  His insulin pump basal was suspended during the morning around 500 AM when he went to take shower. He took 4 units bolus for a FSG of 324 today morning at 11:30 AM. 200 PM FSG was 204    Endocrine History  Age at Dx: 30 years ago in his 1990s  How dx: routine blood work. Had some polyuria, polydipsia, polyphagia  Hx and duration of insulin: He was on metformin and exenatide before. Has been on insulin for the past 6 years. Has been on insulin pump for the past 5 year  Prior insulin Pump Settings  Basal rate @ 4 units/hr  ICR   1200 AM - 4  1200 Noon 3.5  900 PM - 6  ISF 1:25  Target Blood glucose 100    Current Therapy: on insulin pump - medtronic 670G but patent is using in non-automatic mode - changed today, has freestyle storm CGM monitoring - has to get it ready for today. Currently , he did not have his FSG. Also takes Jardiance 10mg once daily, Trulicity 1.5mg/once a week - recently for 9 months   Hx of hypoglycemia- Denies since the basal rate was changed to 2 units/hr  Hx of DKA/HHS - denies    Hx of other regimens  Complications: neuropathy, retinopathy  Outpatient Endo:     [facility] Hx:  STEMI (ST elevation myocardial infarction)  Diverticulitis  MRSA bacteremia  History of celiac disease  CHF (congestive heart failure)  HTN (hypertension)  Diabetes  HLD (hyperlipidemia)  Chronic systolic CHF (congestive heart failure)  Osteoarthritis  HTN (hypertension)  Status post percutaneous transluminal coronary angioplasty  S/P TKR (total knee replacement), right  S/P CABG x 1    Current Meds:  aspirin enteric coated 81 milliGRAM(s) Oral daily  atorvastatin 40 milliGRAM(s) Oral at bedtime  digoxin     Tablet 0.125 milliGRAM(s) Oral daily  DULoxetine 30 milliGRAM(s) Oral daily  HYDROmorphone  Injectable 0.25 milliGRAM(s) IV Push every 30 minutes  HYDROmorphone  Injectable 0.25 milliGRAM(s) IV Push every 4 hours PRN  metoprolol succinate  milliGRAM(s) Oral daily  oxycodone    5 mG/acetaminophen 325 mG 1 Tablet(s) Oral every 4 hours PRN  pantoprazole    Tablet 40 milliGRAM(s) Oral before breakfast  prasugrel 10 milliGRAM(s) Oral daily  sodium chloride 0.9%. 1000 milliLiter(s) IV Continuous <Continuous>      Allergies:  ACE inhibitors (Hives)  enalapril (Hives)  rifampin (Angioedema)  vancomycin (Angioedema)      ROS:  Denies the following except as indicated.    General: weight loss/weight gain, decreased appetite, fatigue  Eyes: Blurry vision, double vision, visual changes  ENT: Throat pain, changes in voice,   CV: palpitations, SOB, CP, cough  GI: NVD, difficulty swallowing, abdominal pain  : polyuria, dysuria  Endo: temperature intolerance  MSK: weakness,  Skin: rash, dryness, diaphoresis  Heme: Easy bruising,bleeding  Neuro: HA, dizziness, lightheadedness  Psych: Anxiety, Depression    Vital Signs Last 24 Hrs  T(C): --  T(F): --  HR: --  BP: --  BP(mean): --  RR: --  SpO2: --  Height (cm): 185.42 (01-16 @ 10:58)  Weight (kg): 104.5 (01-16 @ 10:58)  BMI (kg/m2): 30.4 (01-16 @ 10:58)      Constitutional: wn/wd in NAD.   HEENT: no proptosis or lid retraction  Neck: no thyromegaly or palpable thyroid nodules   Respiratory: lungs CTAB.  Cardiovascular: regular rhythm, normal S1 and S2, no peripheral edema  GI: soft, NT/ND, no masses/HSM appreciated.  Neurology: no tremors, DTR 2+, moves extremities  Psychiatric: AAO x 3, normal affect/mood.  Ext: radial pulses intact, extremities warm, no cyanosis, clubbing.      LABS:    Hemoglobin A1C, Whole Blood: 7.5 (10-22 @ 07:00)      RADIOLOGY & ADDITIONAL STUDIES:  CAPILLARY BLOOD GLUCOSE      POCT Blood Glucose.: 324 mg/dL (16 Jan 2020 11:30)        A/P:64y Male    1.  DM  Please continue lantus       units at night / morning.  Please continue lispro      units before each meal.  Please continue lispro moderate / low dose sliding scale four times daily with meals and at bedtime    Pt's fingerstick glucose goal is     Will continue to monitor     For discharge, pt can continue    Pt can follow up at discharge with [facility] by calling  to make an appointment.   Will discuss case with     and update primary team HPI: 64 M with PMHx of CAD, MI in 2018 s/p CABG and PCI (RCA stent with restenosis while on Plavix) on DAPT, DM II, DLD, Celiac, HFrEF s/p Right TKA on 10/21/19 complicated by surgical site infection admitted to [facility] 11/22-11/26 s/p washout and on PICC antibiotics for MRSA (last day 1/6/2020) recently dc'd 12/16 for chf exacerbation. He got his debridement of his right knee with wound vac placement done today. He denies any fevers, nausea or vomiting. He was also diagnosed with celiac disease and has been having some diarrhea and some nausea on and off since sep 2019.  Endocrine was consulted for his DM management. During his last admission, his basal rate was changed in his insulin pump  to 2 units/hr now and was asked to give himself 3 units bolus for each meal  irrespective of the FSG. He said that his FSG has been stable while on this regimen. He has insulin supplies for next 3 days at his bedside. He signed his insulin pump policy. He uses free style storm for CGM. He said that his FSG at home are running at between 140s to 160s. No hypoglycemic events.    Basal rate 2 units/hr  FSG & Insulin received:  His insulin pump basal was suspended during the morning around 500 AM when he went to take shower. He took 4 units bolus for a FSG of 324 today morning at 11:30 AM. 200 PM FSG was 204    Endocrine History  Age at Dx: 30 years ago in his 1990s  How dx: routine blood work. Had some polyuria, polydipsia, polyphagia  Hx and duration of insulin: He was on metformin and exenatide before. Has been on insulin for the past 6 years. Has been on insulin pump for the past 5 year  Prior insulin Pump Settings  Basal rate @ 4 units/hr  ICR   1200 AM - 4  1200 Noon 3.5  900 PM - 6  ISF 1:25  Target Blood glucose 100    Current Therapy: on insulin pump - medtronic 670G but patent is using in non-automatic mode - changed today, has freestyle storm CGM monitoring - has to get it ready for today. Currently , he did not have his FSG. Also takes Jardiance 10mg once daily, Trulicity 1.5mg/once a week - recently for 9 months   Hx of hypoglycemia- Denies since the basal rate was changed to 2 units/hr  Hx of DKA/HHS - denies    Hx of other regimens  Complications: neuropathy, retinopathy  Outpatient Endo:     [facility] Hx:  STEMI (ST elevation myocardial infarction)  Diverticulitis  MRSA bacteremia  History of celiac disease  CHF (congestive heart failure)  HTN (hypertension)  Diabetes  HLD (hyperlipidemia)  Chronic systolic CHF (congestive heart failure)  Osteoarthritis  HTN (hypertension)  Status post percutaneous transluminal coronary angioplasty  S/P TKR (total knee replacement), right  S/P CABG x 1    Current Meds:  aspirin enteric coated 81 milliGRAM(s) Oral daily  atorvastatin 40 milliGRAM(s) Oral at bedtime  digoxin     Tablet 0.125 milliGRAM(s) Oral daily  DULoxetine 30 milliGRAM(s) Oral daily  HYDROmorphone  Injectable 0.25 milliGRAM(s) IV Push every 30 minutes  HYDROmorphone  Injectable 0.25 milliGRAM(s) IV Push every 4 hours PRN  metoprolol succinate  milliGRAM(s) Oral daily  oxycodone    5 mG/acetaminophen 325 mG 1 Tablet(s) Oral every 4 hours PRN  pantoprazole    Tablet 40 milliGRAM(s) Oral before breakfast  prasugrel 10 milliGRAM(s) Oral daily  sodium chloride 0.9%. 1000 milliLiter(s) IV Continuous <Continuous>      Allergies:  ACE inhibitors (Hives)  enalapril (Hives)  rifampin (Angioedema)  vancomycin (Angioedema)      ROS:  Denies the following except as indicated.    General: weight loss/weight gain, decreased appetite, fatigue  Eyes: Blurry vision, double vision, visual changes  ENT: Throat pain, changes in voice,   CV: palpitations, SOB, CP, cough  GI: NVD, difficulty swallowing, abdominal pain  : polyuria, dysuria  Endo: temperature intolerance  MSK: weakness,  Skin: rash, dryness, diaphoresis  Heme: Easy bruising,bleeding  Neuro: HA, dizziness, lightheadedness  Psych: Anxiety, Depression    Vital Signs Last 24 Hrs  T(C): --  T(F): --  HR: --  BP: --  BP(mean): --  RR: --  SpO2: --  Height (cm): 185.42 (01-16 @ 10:58)  Weight (kg): 104.5 (01-16 @ 10:58)  BMI (kg/m2): 30.4 (01-16 @ 10:58)      Constitutional: wn/wd in NAD.   HEENT: no proptosis or lid retraction  Neck: no thyromegaly or palpable thyroid nodules   Respiratory: lungs CTAB.  Cardiovascular: regular rhythm, normal S1 and S2, no peripheral edema  GI: soft, NT/ND, no masses/HSM appreciated.  Neurology: no tremors, DTR 2+, moves extremities  Psychiatric: AAO x 3, normal affect/mood.  Ext: radial pulses intact, extremities warm, no cyanosis, clubbing. Right knee with the wound vac seen      LABS:    Hemoglobin A1C, Whole Blood: 7.5 (10-22 @ 07:00)      RADIOLOGY & ADDITIONAL STUDIES:  CAPILLARY BLOOD GLUCOSE      POCT Blood Glucose.: 324 mg/dL (16 Jan 2020 11:30)        A/P:Patient is 63 y/o maleDM, CVT, CHF, HLD, CAD with right knee OA s/p right total knee arthroplasty s/p wound debridement and wound vac placement.    1.  DM Type 2 - controlled - with neuropathy and retinopathy  Hba1c 7.5  Cr/GFR 0.97/97  MUGA scan showed EF 26%  Wt 104.5 kg with BMI 30.4  Patient can continue to be on insulin pump - Patient signed the insulin pump policy.  We will continue the basal rate at 2.0 units/hr on insulin pump.   He can give 3 units bolus via the insulin pump for each meal  PLease check FSG AC and HS  Will continue to monitor     2. Hyperlipidemia  - Goal LDL < 70  - Continue statin.    For discharge, Patient can be discharged on insulin pump    Pt can follow up at discharge with , [facility] by calling  to make an appointment.   Discussed case with  HPI: 64 M with PMHx of CAD, MI in 2018 s/p CABG and PCI (RCA stent with restenosis while on Plavix) on DAPT, DM II, DLD, Celiac, HFrEF s/p Right TKA on 10/21/19 complicated by surgical site infection admitted to [facility] 11/22-11/26 s/p washout and on PICC antibiotics for MRSA (last day 1/6/2020) recently dc'd 12/16 for chf exacerbation. He got his debridement of his right knee with wound vac placement done today. He denies any fevers, nausea or vomiting. He was also diagnosed with celiac disease and has been having some diarrhea and some nausea on and off since sep 2019.  Endocrine was consulted for his DM management. During his last admission, his basal rate was changed in his insulin pump  to 2 units/hr now and was asked to give himself 3 units bolus for each meal  irrespective of the FSG. He said that his FSG has been stable while on this regimen. He has insulin supplies for next 3 days at his bedside. He signed his insulin pump policy. He uses free style storm for CGM. He said that his FSG at home are running at between 140s to 160s. No hypoglycemic events.    Basal rate 2 units/hr  FSG & Insulin received:  His insulin pump basal was suspended during the morning around 500 AM when he went to take shower. He took 4 units bolus for a FSG of 324 today morning at 11:30 AM. 200 PM FSG was 204    Endocrine History  Age at Dx: 30 years ago in his 1990s  How dx: routine blood work. Had some polyuria, polydipsia, polyphagia  Hx and duration of insulin: He was on metformin and exenatide before. Has been on insulin for the past 6 years. Has been on insulin pump for the past 5 year  Prior insulin Pump Settings  Basal rate @ 4 units/hr  ICR   1200 AM - 4  1200 Noon 3.5  900 PM - 6  ISF 1:25  Target Blood glucose 100    Current Therapy: on insulin pump - medtronic 670G but patent is using in non-automatic mode - changed today, has freestyle storm CGM monitoring - has to get it ready for today. Currently , he did not have his FSG. Also takes Jardiance 10mg once daily, Trulicity 1.5mg/once a week - recently for 9 months   Hx of hypoglycemia- Denies since the basal rate was changed to 2 units/hr  Hx of DKA/HHS - denies    Hx of other regimens  Complications: neuropathy, retinopathy  Outpatient Endo:     [facility] Hx:  STEMI (ST elevation myocardial infarction)  Diverticulitis  MRSA bacteremia  History of celiac disease  CHF (congestive heart failure)  HTN (hypertension)  Diabetes  HLD (hyperlipidemia)  Chronic systolic CHF (congestive heart failure)  Osteoarthritis  HTN (hypertension)  Status post percutaneous transluminal coronary angioplasty  S/P TKR (total knee replacement), right  S/P CABG x 1    Current Meds:  aspirin enteric coated 81 milliGRAM(s) Oral daily  atorvastatin 40 milliGRAM(s) Oral at bedtime  digoxin     Tablet 0.125 milliGRAM(s) Oral daily  DULoxetine 30 milliGRAM(s) Oral daily  HYDROmorphone  Injectable 0.25 milliGRAM(s) IV Push every 30 minutes  HYDROmorphone  Injectable 0.25 milliGRAM(s) IV Push every 4 hours PRN  metoprolol succinate  milliGRAM(s) Oral daily  oxycodone    5 mG/acetaminophen 325 mG 1 Tablet(s) Oral every 4 hours PRN  pantoprazole    Tablet 40 milliGRAM(s) Oral before breakfast  prasugrel 10 milliGRAM(s) Oral daily  sodium chloride 0.9%. 1000 milliLiter(s) IV Continuous <Continuous>      Allergies:  ACE inhibitors (Hives)  enalapril (Hives)  rifampin (Angioedema)  vancomycin (Angioedema)      ROS:  Denies the following except as indicated.    General: weight loss/weight gain, decreased appetite, fatigue  Eyes: Blurry vision, double vision, visual changes  ENT: Throat pain, changes in voice,   CV: palpitations, SOB, CP, cough  GI: NVD, difficulty swallowing, abdominal pain  : polyuria, dysuria  Endo: temperature intolerance  MSK: weakness,  Skin: rash, dryness, diaphoresis  Heme: Easy bruising,bleeding  Neuro: HA, dizziness, lightheadedness  Psych: Anxiety, Depression    Vital Signs Last 24 Hrs  T(C): --  T(F): --  HR: --  BP: --  BP(mean): --  RR: --  SpO2: --  Height (cm): 185.42 (01-16 @ 10:58)  Weight (kg): 104.5 (01-16 @ 10:58)  BMI (kg/m2): 30.4 (01-16 @ 10:58)      Constitutional: wn/wd in NAD.   HEENT: no proptosis or lid retraction  Neck: no thyromegaly or palpable thyroid nodules   Respiratory: lungs CTAB.  Cardiovascular: regular rhythm, normal S1 and S2, no peripheral edema  GI: soft, NT/ND, no masses/HSM appreciated.  Neurology: no tremors, DTR 2+, moves extremities  Psychiatric: AAO x 3, normal affect/mood.  Ext: radial pulses intact, extremities warm, no cyanosis, clubbing. Right knee with the wound vac seen      LABS:    Hemoglobin A1C, Whole Blood: 7.5 (10-22 @ 07:00)      RADIOLOGY & ADDITIONAL STUDIES:  CAPILLARY BLOOD GLUCOSE      POCT Blood Glucose.: 324 mg/dL (16 Jan 2020 11:30)    Lipid Profile (11.25.19 @ 06:50)    Total Cholesterol/HDL Ratio Measurement: 3.0 RATIO    Cholesterol, Serum: 68 mg/dL    Triglycerides, Serum: 52 mg/dL    HDL Cholesterol, Serum: 23: HDL Levels >/= 60 mg/dL are considered beneficial and a "negative" risk  factor.  Effective 08/15/2018: New reference range and interpretive comment. mg/dL    Direct LDL: 35: LDL Cholesterol --- Interpretive Comment (for adults 18 and over)  Below 70                  Ideal for people at very high risk of heart  disease  Below 100                Ideal for people at risk of heart disease  100 - 129                  [place]  130 - 159                  Borderline high  160 - 189                  High  190 and Above        Very high mg/dL            A/P:Patient is 65 y/o maleDM, CVT, CHF, HLD, CAD with right knee OA s/p right total knee arthroplasty s/p wound debridement and wound vac placement.    1.  DM Type 2 - controlled - with neuropathy and retinopathy  Hba1c 7.5  Cr/GFR 0.97/97  MUGA scan showed EF 26%  Wt 104.5 kg with BMI 30.4  Patient can continue to be on insulin pump - Patient signed the insulin pump policy.  We will continue the basal rate at 2.0 units/hr on insulin pump.   He can give 3 units bolus via the insulin pump for each meal  PLease check FSG AC and HS  Will continue to monitor     2. Hyperlipidemia  - Goal LDL < 70  - Continue statin.    For discharge, Patient can be discharged on insulin pump    Pt can follow up at discharge with , [facility] by calling  to make an appointment.   Discussed case with

## 2020-01-16 NOTE — CONSULT NOTE ADULT - SUBJECTIVE AND OBJECTIVE BOX
HPI: Patient admitted for R knee debridement of nonhealing surgical wound. He completed 8 weeks of IV Vancomycin, but last 2-3 weeks was not compliant, His BVanvo trough on 1/14/2020 was <4.0. Patient c/o hand swelling with IV Vancomycin( ? reason for noncompliance) and his ESR was 77, CRP 18.      PAST MEDICAL & SURGICAL HISTORY:  Diabetic neuropathy  STEMI (ST elevation myocardial infarction)  Diverticulitis  MRSA bacteremia  History of celiac disease  CHF (congestive heart failure)  HTN (hypertension)  Diabetes  Blood clot due to device, implant, or graft: was on blood thinners  HLD (hyperlipidemia)  Osteoarthritis  Atherosclerosis of coronary artery: CAD (coronary artery disease)  Status post percutaneous transluminal coronary angioplasty: in 2012  Surgery, elective: Right shoulder  Surgery, elective: right knee wound debridement  S/P TKR (total knee replacement), right: with infection Mrsa   per pt he was cleared from MRSA infection  S/P CABG x 1: 2018  Stented coronary artery: 10/18 heart attack  INFERIOR WALL MI  Other postprocedural status: Fixation hardware in foot LEFT        REVIEW OF SYSTEMS:  General:  no weakness; no fevers, no chills  Skin/Breast: no rash  Respiratory and Thorax: no SOB, no cough  Cardiovascular:	No chest pain  Gastrointestinal:	 no nausea, vomiting , diarrhea  Genitourinary:	no dysuria, no difficulty urinating, no hematuria  Musculoskeletal:	no weakness, no joint swelling/pain, R knee wound with eschar/ dehiscence  Neurological:	no focal weakness/numbness  Endocrine: no polyuria, no polydipsia      ANTIBIOTICS:  MEDICATIONS  (STANDING):  aspirin enteric coated 81 milliGRAM(s) Oral daily  atorvastatin 40 milliGRAM(s) Oral at bedtime  digoxin     Tablet 0.125 milliGRAM(s) Oral daily  doxycycline hyclate Capsule 100 milliGRAM(s) Oral every 12 hours  DULoxetine 30 milliGRAM(s) Oral daily  metoprolol succinate  milliGRAM(s) Oral daily  pantoprazole    Tablet 40 milliGRAM(s) Oral before breakfast  prasugrel 10 milliGRAM(s) Oral daily    MEDICATIONS  (PRN):  HYDROmorphone  Injectable 0.25 milliGRAM(s) IV Push every 4 hours PRN Pain  oxycodone    5 mG/acetaminophen 325 mG 1 Tablet(s) Oral every 4 hours PRN Moderate Pain (4 - 6)      Allergies    ACE inhibitors (Hives)  enalapril (Hives)  rifampin (Angioedema)  vancomycin (Angioedema)        SOCIAL HISTORY:    FAMILY HISTORY:  Family history of heart disease (Mother)  Family history of heart disease (Mother)      Vital Signs Last 24 Hrs  T(C): 35.9 (16 Jan 2020 13:13), Max: 35.9 (16 Jan 2020 13:13)  T(F): 96.6 (16 Jan 2020 13:13), Max: 96.6 (16 Jan 2020 13:13)  HR: 94 (16 Jan 2020 17:17) (88 - 96)  BP: 119/71 (16 Jan 2020 17:17) (114/73 - 137/68)  BP(mean): 89 (16 Jan 2020 17:17) (87 - 100)  RR: 22 (16 Jan 2020 17:17) (18 - 26)  SpO2: 92% (16 Jan 2020 17:17) (92% - 100%)      PHYSICAL EXAM:    Constitutional:Well-developed, well nourished  Eyes:DAMARIS, EOMI  Ear/Nose/Throat: no oral lesion, no sinus tenderness on percussion	  Neck:no JVD, no lymphadenopathy, supple  Respiratory: CTA colton  Cardiovascular: S1S2 RRR, no murmurs  Gastrointestinal:soft, (+) BS, no HSM  Extremities: R knee  incision with Vac dressing  Pulses: +2DP, WWP feet  Motor: 5/5 EHL/FHL/TA/GS        LABS:    ESR 77, CRP 18 on 1/14/2020        MICROBIOLOGY:  Culture - Surgical Swab (11.22.19 @ 19:16)    Gram Stain:   No organisms seen  Rare WBC's    -  Cefazolin: R 8    -  Linezolid: S 2    -  Erythromycin: R >4    -  Daptomycin: S 0.5    -  Clindamycin: S <=0.25    -  Penicillin: R >8    -  Oxacillin: R >2    -  RIF- Rifampin: S <=1 Should not be used as monotherapy    -  Tetra/Doxy: S <=1    -  Vancomycin: S 1    -  Vancomycin: S 1.5    -  Trimethoprim/Sulfamethoxazole: R >2/38    Specimen Source: .Surgical Swab Right knee superficial    Culture Results:   Few Methicillin resistant Staphylococcus aureus  Floor previously notified.    Organism Identification: Methicillin resistant Staphylococcus aureus  Methicillin resistant Staphylococcus aureus    Organism: Methicillin resistant Staphylococcus aureus    Organism: Methicillin resistant Staphylococcus aureus    Method Type: YOLIE    Method Type: ETEST      RADIOLOGY & ADDITIONAL STUDIES:

## 2020-01-16 NOTE — CONSULT NOTE ADULT - PROBLEM SELECTOR RECOMMENDATION 9
1) Continue Doxycycline 100md bid x 2-3 weeks( until R knee wound closes)  2) Wound culture was not sent

## 2020-01-16 NOTE — BRIEF OPERATIVE NOTE - OPERATION/FINDINGS
Right anterior knee incision from prior TKA with dehiscence and dry fibrinous scab, debrideded to healthy skin edges and subcutaneous tissue/fascia. No pus identified. Vac placed.

## 2020-01-16 NOTE — CONSULT NOTE ADULT - ASSESSMENT
A/P 64y Male with hx of CAD, PCI/stent one year ago, DM well controlled , admitted for R knee surgical wound infection, s/p TKR 10/21, s/p I&D, Poly exchange , OR culture (+) MRSA on 11/22, completed IV Vancomycin, readmitted for Wound dehiscence and debridement, s/p Vac placement

## 2020-01-16 NOTE — CONSULT NOTE ADULT - ATTENDING COMMENTS
A/P: 64y Male with hx of DM presenting for management of septic arthritis, now s/p debridement.     1.  DM - type 2, on insulin pump.   pt can continue use of insulin pump this admission.  We will monitor glucose readings and adjust settings if needed.      2.  Hyperlipidemia - LDL goal < 70  at goal, continue statin    3.  Obesity - outpatient medical management.   consider GLP-1 agonist as outpatient.     Pt is advised to follow up with Dr. Cormier as outpatient.

## 2020-01-16 NOTE — PROGRESS NOTE ADULT - SUBJECTIVE AND OBJECTIVE BOX
Vascular Surgery Post-Op Note    Procedure: Right knee wound debridement and VAC placement     Diagnosis/Indication: Rt knee nonhealing wound     Surgeon:Dr. Malloy     S: Pt has no complaints. Denies CP, SOB, JUNG, calf tenderness. Pain controlled with medication.    O:  T(C): 35.9 (01-16-20 @ 13:13), Max: 35.9 (01-16-20 @ 13:13)  T(F): 96.6 (01-16-20 @ 13:13), Max: 96.6 (01-16-20 @ 13:13)  HR: 88 (01-16-20 @ 14:44) (88 - 96)  BP: 119/81 (01-16-20 @ 14:44) (114/73 - 137/68)  RR: 19 (01-16-20 @ 14:44) (18 - 26)  SpO2: 100% (01-16-20 @ 14:44) (99% - 100%)  Wt(kg): --            Gen: NAD, resting comfortably in bed  C/V: NSR  Pulm: Nonlabored breathing, no respiratory distress  Abd: soft, NT/ND  Extrem:  Right knee with VAC in place. no surrounding erythema   5cc drainage in VAC canister       A/P: 64yMale s/p above procedure  Diet: Regular   IVF: NS@30   Pain/nausea control  DVT ppx: ASA/ Effient   Dispo plan: SW and  to work on obtaining home VAC and Nursing for VAC change at home   ID: Dr. Saeed consulted and recommended Doxycycline 100mg BID x2 weeks   -Will need PICC removed prior to discharge

## 2020-01-17 ENCOUNTER — FORM ENCOUNTER (OUTPATIENT)
Age: 65
End: 2020-01-17

## 2020-01-17 DIAGNOSIS — R79.89 OTHER SPECIFIED ABNORMAL FINDINGS OF BLOOD CHEMISTRY: ICD-10-CM

## 2020-01-17 LAB
ANION GAP SERPL CALC-SCNC: 14 MMOL/L — SIGNIFICANT CHANGE UP (ref 5–17)
BUN SERPL-MCNC: 30 MG/DL — HIGH (ref 7–23)
CALCIUM SERPL-MCNC: 8.8 MG/DL — SIGNIFICANT CHANGE UP (ref 8.4–10.5)
CHLORIDE SERPL-SCNC: 102 MMOL/L — SIGNIFICANT CHANGE UP (ref 96–108)
CO2 SERPL-SCNC: 22 MMOL/L — SIGNIFICANT CHANGE UP (ref 22–31)
CREAT SERPL-MCNC: 1.01 MG/DL — SIGNIFICANT CHANGE UP (ref 0.5–1.3)
GLUCOSE BLDC GLUCOMTR-MCNC: 153 MG/DL — HIGH (ref 70–99)
GLUCOSE BLDC GLUCOMTR-MCNC: 171 MG/DL — HIGH (ref 70–99)
GLUCOSE BLDC GLUCOMTR-MCNC: 299 MG/DL — HIGH (ref 70–99)
GLUCOSE BLDC GLUCOMTR-MCNC: 45 MG/DL — CRITICAL LOW (ref 70–99)
GLUCOSE SERPL-MCNC: 41 MG/DL — CRITICAL LOW (ref 70–99)
HBA1C BLD-MCNC: 9.1 % — HIGH (ref 4–5.6)
HCT VFR BLD CALC: 35.8 % — LOW (ref 39–50)
HGB BLD-MCNC: 11 G/DL — LOW (ref 13–17)
MAGNESIUM SERPL-MCNC: 2 MG/DL — SIGNIFICANT CHANGE UP (ref 1.6–2.6)
MCHC RBC-ENTMCNC: 22.8 PG — LOW (ref 27–34)
MCHC RBC-ENTMCNC: 30.7 GM/DL — LOW (ref 32–36)
MCV RBC AUTO: 74.3 FL — LOW (ref 80–100)
NRBC # BLD: 0 /100 WBCS — SIGNIFICANT CHANGE UP (ref 0–0)
PHOSPHATE SERPL-MCNC: 3.7 MG/DL — SIGNIFICANT CHANGE UP (ref 2.5–4.5)
PLATELET # BLD AUTO: 336 K/UL — SIGNIFICANT CHANGE UP (ref 150–400)
POTASSIUM SERPL-MCNC: 3.5 MMOL/L — SIGNIFICANT CHANGE UP (ref 3.5–5.3)
POTASSIUM SERPL-SCNC: 3.5 MMOL/L — SIGNIFICANT CHANGE UP (ref 3.5–5.3)
RBC # BLD: 4.82 M/UL — SIGNIFICANT CHANGE UP (ref 4.2–5.8)
RBC # FLD: 19.4 % — HIGH (ref 10.3–14.5)
SODIUM SERPL-SCNC: 138 MMOL/L — SIGNIFICANT CHANGE UP (ref 135–145)
WBC # BLD: 8.58 K/UL — SIGNIFICANT CHANGE UP (ref 3.8–10.5)
WBC # FLD AUTO: 8.58 K/UL — SIGNIFICANT CHANGE UP (ref 3.8–10.5)

## 2020-01-17 PROCEDURE — 99220: CPT

## 2020-01-17 RX ORDER — INSULIN ASPART 100 [IU]/ML
1 INJECTION, SOLUTION SUBCUTANEOUS
Refills: 0 | Status: DISCONTINUED | OUTPATIENT
Start: 2020-01-17 | End: 2020-01-18

## 2020-01-17 RX ORDER — METOCLOPRAMIDE HCL 10 MG
10 TABLET ORAL ONCE
Refills: 0 | Status: COMPLETED | OUTPATIENT
Start: 2020-01-17 | End: 2020-01-17

## 2020-01-17 RX ORDER — HYDROMORPHONE HYDROCHLORIDE 2 MG/ML
0.5 INJECTION INTRAMUSCULAR; INTRAVENOUS; SUBCUTANEOUS EVERY 6 HOURS
Refills: 0 | Status: DISCONTINUED | OUTPATIENT
Start: 2020-01-17 | End: 2020-01-18

## 2020-01-17 RX ORDER — DEXTROSE 50 % IN WATER 50 %
25 SYRINGE (ML) INTRAVENOUS ONCE
Refills: 0 | Status: COMPLETED | OUTPATIENT
Start: 2020-01-17 | End: 2020-01-17

## 2020-01-17 RX ORDER — DEXTROSE 50 % IN WATER 50 %
25 SYRINGE (ML) INTRAVENOUS ONCE
Refills: 0 | Status: DISCONTINUED | OUTPATIENT
Start: 2020-01-17 | End: 2020-01-17

## 2020-01-17 RX ORDER — OXYCODONE AND ACETAMINOPHEN 5; 325 MG/1; MG/1
2 TABLET ORAL EVERY 6 HOURS
Refills: 0 | Status: DISCONTINUED | OUTPATIENT
Start: 2020-01-17 | End: 2020-01-18

## 2020-01-17 RX ORDER — PANTOPRAZOLE SODIUM 20 MG/1
40 TABLET, DELAYED RELEASE ORAL ONCE
Refills: 0 | Status: COMPLETED | OUTPATIENT
Start: 2020-01-17 | End: 2020-01-17

## 2020-01-17 RX ORDER — POTASSIUM CHLORIDE 20 MEQ
40 PACKET (EA) ORAL ONCE
Refills: 0 | Status: COMPLETED | OUTPATIENT
Start: 2020-01-17 | End: 2020-01-17

## 2020-01-17 RX ORDER — DULOXETINE HYDROCHLORIDE 30 MG/1
90 CAPSULE, DELAYED RELEASE ORAL DAILY
Refills: 0 | Status: DISCONTINUED | OUTPATIENT
Start: 2020-01-17 | End: 2020-01-18

## 2020-01-17 RX ADMIN — ONDANSETRON 4 MILLIGRAM(S): 8 TABLET, FILM COATED ORAL at 17:12

## 2020-01-17 RX ADMIN — Medication 40 MILLIEQUIVALENT(S): at 11:33

## 2020-01-17 RX ADMIN — Medication 100 MILLIGRAM(S): at 05:39

## 2020-01-17 RX ADMIN — OXYCODONE AND ACETAMINOPHEN 1 TABLET(S): 5; 325 TABLET ORAL at 09:41

## 2020-01-17 RX ADMIN — Medication 100 MILLIGRAM(S): at 17:44

## 2020-01-17 RX ADMIN — PANTOPRAZOLE SODIUM 40 MILLIGRAM(S): 20 TABLET, DELAYED RELEASE ORAL at 05:39

## 2020-01-17 RX ADMIN — PRASUGREL 10 MILLIGRAM(S): 5 TABLET, FILM COATED ORAL at 11:33

## 2020-01-17 RX ADMIN — HYDROMORPHONE HYDROCHLORIDE 0.25 MILLIGRAM(S): 2 INJECTION INTRAMUSCULAR; INTRAVENOUS; SUBCUTANEOUS at 11:32

## 2020-01-17 RX ADMIN — Medication 81 MILLIGRAM(S): at 11:33

## 2020-01-17 RX ADMIN — OXYCODONE AND ACETAMINOPHEN 1 TABLET(S): 5; 325 TABLET ORAL at 15:42

## 2020-01-17 RX ADMIN — OXYCODONE AND ACETAMINOPHEN 1 TABLET(S): 5; 325 TABLET ORAL at 16:04

## 2020-01-17 RX ADMIN — DULOXETINE HYDROCHLORIDE 30 MILLIGRAM(S): 30 CAPSULE, DELAYED RELEASE ORAL at 11:33

## 2020-01-17 RX ADMIN — Medication 0.12 MILLIGRAM(S): at 05:39

## 2020-01-17 RX ADMIN — HYDROMORPHONE HYDROCHLORIDE 0.5 MILLIGRAM(S): 2 INJECTION INTRAMUSCULAR; INTRAVENOUS; SUBCUTANEOUS at 16:36

## 2020-01-17 RX ADMIN — HYDROMORPHONE HYDROCHLORIDE 0.25 MILLIGRAM(S): 2 INJECTION INTRAMUSCULAR; INTRAVENOUS; SUBCUTANEOUS at 11:33

## 2020-01-17 RX ADMIN — HYDROMORPHONE HYDROCHLORIDE 0.5 MILLIGRAM(S): 2 INJECTION INTRAMUSCULAR; INTRAVENOUS; SUBCUTANEOUS at 16:58

## 2020-01-17 RX ADMIN — Medication 25 GRAM(S): at 07:07

## 2020-01-17 RX ADMIN — Medication 10 MILLIGRAM(S): at 19:34

## 2020-01-17 RX ADMIN — PANTOPRAZOLE SODIUM 40 MILLIGRAM(S): 20 TABLET, DELAYED RELEASE ORAL at 19:34

## 2020-01-17 RX ADMIN — DULOXETINE HYDROCHLORIDE 90 MILLIGRAM(S): 30 CAPSULE, DELAYED RELEASE ORAL at 15:42

## 2020-01-17 NOTE — H&P ADULT - NSICDXPASTMEDICALHX_GEN_ALL_CORE_FT
PAST MEDICAL HISTORY:  Atherosclerosis of coronary artery CAD (coronary artery disease)    Blood clot due to device, implant, or graft was on blood thinners    CHF (congestive heart failure)     Diabetes     Diabetic neuropathy     Diverticulitis     History of celiac disease     HLD (hyperlipidemia)     HTN (hypertension)     MRSA bacteremia     Osteoarthritis     Status post percutaneous transluminal coronary angioplasty in 2012    STEMI (ST elevation myocardial infarction)

## 2020-01-17 NOTE — H&P ADULT - ASSESSMENT
63 y/o M, PMHx CAD s/p CABG and PCI on DPT, DM (insulin pump) CHF 25%,  s/p Right TKR 10/19 c/b wound infection. s/p washout and PICC with IV Abx for MRSA - last day 1/15/20. Wound now with margianal necrosis and superficial dehiscence, s/p R knee debridement and VAC placement     Duloxetine, Pain/Nausea  ASA, Prasugrel  Digoxin, Metoprolol  Diabetic Diet, Protonix,  Doxycycline, D/C w/ Doxy BID for 2 weeks

## 2020-01-17 NOTE — PROGRESS NOTE ADULT - SUBJECTIVE AND OBJECTIVE BOX
INTERVAL HPI/OVERNIGHT EVENTS: Feels better today, but vomited this PM; given Zofran    CONSTITUTIONAL:  Negative fever or chills, feels better  EYES:  Negative  blurry vision or double vision  CARDIOVASCULAR:  Negative for chest pain or palpitations  RESPIRATORY:  Negative for cough, wheezing, or SOB   GASTROINTESTINAL:  Negative for nausea,(+) vomiting, no diarrhea, constipation, or abdominal pain  GENITOURINARY:  Negative frequency, urgency or dysuria  NEUROLOGIC:  No headache, confusion, dizziness, lightheadedness      ANTIBIOTICS/RELEVANT:    MEDICATIONS  (STANDING):  aspirin enteric coated 81 milliGRAM(s) Oral daily  atorvastatin 40 milliGRAM(s) Oral at bedtime  dextrose 5%. 1000 milliLiter(s) (50 mL/Hr) IV Continuous <Continuous>  dextrose 50% Injectable 12.5 Gram(s) IV Push once  dextrose 50% Injectable 25 Gram(s) IV Push once  dextrose 50% Injectable 25 Gram(s) IV Push once  digoxin     Tablet 0.125 milliGRAM(s) Oral daily  doxycycline hyclate Capsule 100 milliGRAM(s) Oral every 12 hours  DULoxetine 90 milliGRAM(s) Oral daily  insulin aspart (NovoLOG) Pump 1 Each SubCutaneous Continuous Pump  metoprolol succinate  milliGRAM(s) Oral daily  pantoprazole    Tablet 40 milliGRAM(s) Oral before breakfast  prasugrel 10 milliGRAM(s) Oral daily    MEDICATIONS  (PRN):  dextrose 40% Gel 15 Gram(s) Oral once PRN Blood Glucose LESS THAN 70 milliGRAM(s)/deciLiter  glucagon  Injectable 1 milliGRAM(s) IntraMuscular once PRN Glucose <70 milliGRAM(s)/deciLiter  HYDROmorphone  Injectable 0.5 milliGRAM(s) IV Push every 6 hours PRN breakthrough pain  ondansetron Injectable 4 milliGRAM(s) IV Push every 6 hours PRN Nausea and/or Vomiting  oxycodone    5 mG/acetaminophen 325 mG 2 Tablet(s) Oral every 6 hours PRN Severe Pain (7 - 10)  oxycodone    5 mG/acetaminophen 325 mG 1 Tablet(s) Oral every 4 hours PRN Moderate Pain (4 - 6)        Vital Signs Last 24 Hrs  T(C): 36.7 (17 Jan 2020 12:57), Max: 36.8 (16 Jan 2020 21:44)  T(F): 98 (17 Jan 2020 12:57), Max: 98.2 (16 Jan 2020 21:44)  HR: 97 (17 Jan 2020 12:57) (75 - 99)  BP: 99/82 (17 Jan 2020 12:57) (99/82 - 128/88)  BP(mean): 96 (16 Jan 2020 21:17) (90 - 103)  RR: 16 (17 Jan 2020 12:57) (12 - 20)  SpO2: 95% (17 Jan 2020 12:57) (95% - 100%)    01-16-20 @ 07:01  -  01-17-20 @ 07:00  --------------------------------------------------------  IN: 0 mL / OUT: 545 mL / NET: -545 mL    01-17-20 @ 07:01  -  01-17-20 @ 19:00  --------------------------------------------------------  IN: 360 mL / OUT: 600 mL / NET: -240 mL      PHYSICAL EXAM:  Constitutional:Well-developed, well nourished  Eyes:DAMARIS, EOMI  Ear/Nose/Throat: no oral lesion, no sinus tenderness on percussion	  Neck:no JVD, no lymphadenopathy, supple  Respiratory: CTA colton  Cardiovascular: S1S2 RRR, no murmurs  Gastrointestinal:soft, (+) BS, no HSM  Extremities: R knee  incision with Vac dressing  Pulses: +2DP, WWP feet  Motor: 5/5 EHL/FHL/TA/GS    LABS:                        11.0   8.58  )-----------( 336      ( 17 Jan 2020 06:27 )             35.8     01-17    138  |  102  |  30<H>  ----------------------------<  41<LL>  3.5   |  22  |  1.01    Ca    8.8      17 Jan 2020 06:27  Phos  3.7     01-17  Mg     2.0     01-17            MICROBIOLOGY:    RADIOLOGY & ADDITIONAL STUDIES:

## 2020-01-17 NOTE — PROGRESS NOTE ADULT - ATTENDING COMMENTS
Pt seen and examined at bedside.   Pt with low glucose this morning  endorses frequently interupting basal delivery when he plans to skip a meal to prevent low BG.    Basal rate reduced to 1.7 units/hour from 2.0 units/hr  pt planned for dc tomorrow  will follow

## 2020-01-17 NOTE — PROGRESS NOTE ADULT - SUBJECTIVE AND OBJECTIVE BOX
O/N: ANAHI, VSS   1/16: POC w/ scant amount of drainage. D/C IVF, patient tolerating PO.       Patient is a 64y old  Male who presents with a chief complaint of debridement (16 Jan 2020 13:48)      INTERVAL HPI/OVERNIGHT EVENTS:      MEDICATIONS  (STANDING):  aspirin enteric coated 81 milliGRAM(s) Oral daily  atorvastatin 40 milliGRAM(s) Oral at bedtime  dextrose 5%. 1000 milliLiter(s) (50 mL/Hr) IV Continuous <Continuous>  dextrose 50% Injectable 12.5 Gram(s) IV Push once  dextrose 50% Injectable 25 Gram(s) IV Push once  dextrose 50% Injectable 25 Gram(s) IV Push once  digoxin     Tablet 0.125 milliGRAM(s) Oral daily  doxycycline hyclate Capsule 100 milliGRAM(s) Oral every 12 hours  DULoxetine 30 milliGRAM(s) Oral daily  insulin aspart (NovoLOG) Pump 1 Each SubCutaneous Continuous Pump  metoprolol succinate  milliGRAM(s) Oral daily  pantoprazole    Tablet 40 milliGRAM(s) Oral before breakfast  prasugrel 10 milliGRAM(s) Oral daily    MEDICATIONS  (PRN):  dextrose 40% Gel 15 Gram(s) Oral once PRN Blood Glucose LESS THAN 70 milliGRAM(s)/deciLiter  glucagon  Injectable 1 milliGRAM(s) IntraMuscular once PRN Glucose <70 milliGRAM(s)/deciLiter  HYDROmorphone  Injectable 0.25 milliGRAM(s) IV Push every 4 hours PRN Pain  ondansetron Injectable 4 milliGRAM(s) IV Push every 6 hours PRN Nausea and/or Vomiting  oxycodone    5 mG/acetaminophen 325 mG 1 Tablet(s) Oral every 4 hours PRN Moderate Pain (4 - 6)      Allergies    ACE inhibitors (Hives)  enalapril (Hives)  rifampin (Angioedema)  vancomycin (Angioedema)    Intolerances          Vital Signs Last 24 Hrs  T(C): 36.4 (17 Jan 2020 05:35), Max: 36.8 (16 Jan 2020 21:44)  T(F): 97.6 (17 Jan 2020 05:35), Max: 98.2 (16 Jan 2020 21:44)  HR: 75 (17 Jan 2020 05:35) (75 - 99)  BP: 125/72 (17 Jan 2020 05:35) (112/77 - 137/68)  BP(mean): 96 (16 Jan 2020 21:17) (87 - 103)  RR: 17 (17 Jan 2020 05:35) (12 - 26)  SpO2: 96% (17 Jan 2020 05:35) (92% - 100%)  CAPILLARY BLOOD GLUCOSE      POCT Blood Glucose.: 153 mg/dL (17 Jan 2020 07:17)  POCT Blood Glucose.: 45 mg/dL (17 Jan 2020 06:50)  POCT Blood Glucose.: 75 mg/dL (16 Jan 2020 23:49)  POCT Blood Glucose.: 206 mg/dL (16 Jan 2020 13:57)  POCT Blood Glucose.: 324 mg/dL (16 Jan 2020 11:30)      01-16 @ 07:01  -  01-17 @ 07:00  --------------------------------------------------------  IN: 0 mL / OUT: 545 mL / NET: -545 mL        Physical Exam:    Daily     Daily   General:  Well appearing, NAD  CV:  RRR  Lungs:  nonlabored breathing  Abdomen:  Soft, non-tender, no distended  Extremities:  RLE- Vac intact and functioning  Neuro:  AAOx3    LABS:                        11.0   8.58  )-----------( 336      ( 17 Jan 2020 06:27 )             35.8     01-17    138  |  102  |  30<H>  ----------------------------<  41<LL>  3.5   |  22  |  1.01    Ca    8.8      17 Jan 2020 06:27  Phos  3.7     01-17  Mg     2.0     01-17              RADIOLOGY & ADDITIONAL TESTS:    ---------------------------------------------------------------------------  I personally reviewed: [  ]EKG   [  ]CXR    [  ] CT    [  ]Other  ---------------------------------------------------------------------------  PLEASE CHECK WHEN PRESENT:     [  ] Heart Failure     [  ] Acute     [  ] Acute on Chronic     [X  ] Chronic  -------------------------------------------------------------------     [  ]Diastolic [HFpEF]     [X  ]Systolic [HFrEF]     [  ]Combined [HFpEF & HFrEF]     [X  ]Other: HTN  -------------------------------------------------------------------  [ ] Respiratory failure  [ ] Acute cor pulmonale  [ ] Asthma/COPD Exacerbation  [ ] Pleural effusion  [ ] Aspiration pneumonia  -------------------------------------------------------------------  [  ]MOISES     [  ]ATN     [  ]Reneal Medullary Necrosis     [  ]Renal Cortical Necrosis     [  ]Other Pathological Lesions:    [  ]CKD 1  [  ]CKD 2  [  ]CKD 3  [  ]CKD 4  [  ]CKD 5  [  ]Other  -------------------------------------------------------------------  [X  ]Diabetes  [  ] Diabetic PVD Ulcer  [  ] Neuropathic ulcer to DM  [ X ] Diabetes with Nephropathy  [  ] Osteomyelitis due to diabetes  --------------------------------------------------------------------  [  ]Malnutrition: See Nutrition Note  [  ]Cachexia  [  ]Other:   [  ]Supplement Ordered:  [  ]Morbid Obesity (BMI >=40]  ---------------------------------------------------------------------  [ ] Sepsis/severe sepsis/septic shock  [ ] UTI  [ ] Pneumonia  -----------------------------------------------------------------------  [ ] Acidosis/alkalosis  [ ] Fluid overload  [ ] Hypokalemia  [ ] Hyperkalemia  [ ] Hypomagnesemia  [ ] Hypophosphatemia  [ ] Hyperphosphatemia  ------------------------------------------------------------------------  [ ] Acute blood loss anemia  [ ] Post op blood loss anemia  [ ] Iron deficiency anemia  [ ] Anemia due to chronic disease  [ ] Hypercoagulable state  ----------------------------------------------------------------------  [ ] Cerebral infarction  [ ] Transient ischemia attack  [ ] Encephalopathy      A/P: 64yMale s/p Right knee wound debridement and VAC placement 1/16  Diet: Regular   Pain/nausea control  DVT ppx: ASA/ Effient   Dispo plan: SW and  to work on obtaining home VAC and Nursing for VAC change at home   ID: Dr. Saeed consulted and recommended Doxycycline 100mg BID x2 weeks

## 2020-01-17 NOTE — CONSULT NOTE ADULT - SUBJECTIVE AND OBJECTIVE BOX
CHIEF COMPLAINT:  Wound infection  HISTORY OF PRESENT ILLNESS:  64 M with PMHx of CAD, MI in 2018 s/p CABG(lima to lad) and PCI (RCA stent with thrombosis while on Plavix) on DAPT, DM II, DLD, Celiac, HFrEF s/p Right TKR on 10/21/19.   The patient can climb one flight of stairs. He is sometimes dizzy upon arising. His walks in the mall without dyspnea. While hospitalized  he had an echocardiogram with an ejection fraction of 11% and severe pulmonary hypertension. The gated scan showed an ejection fraction of 26%. He was seen by the electrophysiologist. The plan was an ICD and biventricular pacemaker after he completes antibiotics. He was placed on digoxin.  He is admitted for wound debridement.  Chart, PMH, medication and allergies reviewed  PAST MEDICAL & SURGICAL HISTORY:  Diabetic neuropathy  STEMI (ST elevation myocardial infarction)  Diverticulitis  MRSA bacteremia  History of celiac disease  CHF (congestive heart failure)  HTN (hypertension)  Diabetes  Blood clot due to device, implant, or graft: was on blood thinners  HLD (hyperlipidemia)  Osteoarthritis  Atherosclerosis of coronary artery: CAD (coronary artery disease)  Status post percutaneous transluminal coronary angioplasty: in 2012  Surgery, elective: Right shoulder  Surgery, elective: right knee wound debridement  S/P TKR (total knee replacement), right: with infection Mrsa   per pt he was cleared from MRSA infection  S/P CABG x 1: 2018  Stented coronary artery: 10/18 heart attack  INFERIOR WALL MI  Other postprocedural status: Fixation hardware in foot LEFT      [x  ] Diabetes   [  ] Hypertension  [ x ] Hyperlipidemia  [ x ] CAD  [x  ] PCI  [x  ] CABG    PREVIOUS DIAGNOSTIC TESTING:    [x  ] Echocardiogram:  [x  ]  Catheterization:  [ x ] Stress Test:  	    MEDICATIONS:  MEDICATIONS  (STANDING):  aspirin enteric coated 81 milliGRAM(s) Oral daily  atorvastatin 40 milliGRAM(s) Oral at bedtime  dextrose 5%. 1000 milliLiter(s) (50 mL/Hr) IV Continuous <Continuous>  dextrose 50% Injectable 12.5 Gram(s) IV Push once  dextrose 50% Injectable 25 Gram(s) IV Push once  dextrose 50% Injectable 25 Gram(s) IV Push once  digoxin     Tablet 0.125 milliGRAM(s) Oral daily  doxycycline hyclate Capsule 100 milliGRAM(s) Oral every 12 hours  DULoxetine 90 milliGRAM(s) Oral daily  insulin aspart (NovoLOG) Pump 1 Each SubCutaneous Continuous Pump  metoprolol succinate  milliGRAM(s) Oral daily  pantoprazole    Tablet 40 milliGRAM(s) Oral before breakfast  prasugrel 10 milliGRAM(s) Oral daily      FAMILY HISTORY:  Family history of heart disease (Mother)  Family history of heart disease (Mother)      SOCIAL HISTORY:    [  ] Never smoker  [  ] Non-smoker  [  ] Smoker  [  ] Social alcohol use  [ x ] Former smoker    Allergies    ACE inhibitors (Hives)  enalapril (Hives)  rifampin (Angioedema)  vancomycin (Angioedema)  Entresto    Intolerances    	    REVIEW OF SYSTEMS:  CONSTITUTIONAL: No fever, weight loss, or fatigue  EYES: No eye pain, visual disturbances, or discharge  ENT:  No difficulty hearing, tinnitus, vertigo; No sinus or throat pain  NECK: No pain or stiffness  PULMONARY: No cough, no wheezing, chills or hemoptysis; No Shortness of Breath  CARDIOVASCULAR: No chest pain, no  palpitations, no passing out, +dizziness, no leg swelling  GASTROINTESTINAL: No abdominal  pain. No nausea, vomiting, or hematemesis; No diarrhea or constipation. No melena or hematochezia.  GENITOURINARY: No dysuria, frequency, hematuria, or incontinence  NEUROLOGICAL: No headaches, memory loss, loss of strength, numbness, or tremors  SKIN: No itching, burning, rashes, or lesions   LYMPH Nodes: No enlarged glands  ENDOCRINE: No heat or cold intolerance; No hair loss  MUSCULOSKELETAL: No joint pain or swelling; No muscle, back, or extremity pain  PSYCHIATRIC: No depression, anxiety, mood swings, or difficulty sleeping  HEME/LYMPH: No easy bruising, or bleeding gums  ALLERGY AND IMMUNOLOGIC: No hives or eczema	    PHYSICAL EXAM:  T(C): 36.7 (01-17-20 @ 21:22), Max: 36.7 (01-16-20 @ 23:55)  HR: 85 (01-17-20 @ 21:22) (75 - 99)  BP: 107/69 (01-17-20 @ 21:22) (99/82 - 126/85)  RR: 16 (01-17-20 @ 21:22) (15 - 18)  SpO2: 95% (01-17-20 @ 21:22) (95% - 100%)  Wt(kg): --  I&O's Summary    16 Jan 2020 07:01  -  17 Jan 2020 07:00  --------------------------------------------------------  IN: 0 mL / OUT: 545 mL / NET: -545 mL    17 Jan 2020 07:01  -  17 Jan 2020 21:46  --------------------------------------------------------  IN: 360 mL / OUT: 900 mL / NET: -540 mL        Appearance: Normal	  HEENT:   Normal oral mucosa, PERRL, EOMI	  Lymphatic: No lymphadenopathy  Cardiovascular: Normal S1 S2, No JVD, No murmur, No edema  Pulmonary: Lungs clear to auscultation	  Psychiatry: A & O x 3, Mood & affect appropriate  Gastrointestinal:  Soft, Non-tender, + BS	  Skin: No rashes, No ecchymoses, No cyanosis	  Neurologic: Non-focal  Extremities: Normal range of motion, No clubbing or cyanosis, drain in R knee  	 	  CARDIAC MARKERS:                                11.0   8.58  )-----------( 336      ( 17 Jan 2020 06:27 )             35.8     01-17    138  |  102  |  30<H>  ----------------------------<  41<LL>  3.5   |  22  |  1.01    Ca    8.8      17 Jan 2020 06:27  Phos  3.7     01-17  Mg     2.0     01-17      proBNP:  Lipid Profile:  HgA1c:  TSH:    TELEMETRY: 	    ECG: NSR IVCD 	  RADIOLOGY:	    ASSESSMENT/PLAN: 	  Coronary artery disease/stent-the patient had a coronary stent one year ago. He has no chest pain. He is a very high clotting risk. Continue prasugrel and aspirin.  On statin and metoprolol.     Cardiomyopathy -no clinical congestive heart failure. However, his pulmonary pressure is very high. The patient had episode of dyspnea but is now comfortable. Minimize fluids. Start hydralazine 10mg bid and imdur 30 daily. It was again stressed that patient see an allergist to attempt to facilitate starting and ACE inh or ARB. Recent high BNP and pulmonary hypertension- start diuretic on discharge.    Wound infection- successfully debrided    Diabetes- follow fs    Anemia- check iron TIBC and ferritin with low MCV.

## 2020-01-17 NOTE — H&P ADULT - HISTORY OF PRESENT ILLNESS
65 y/o M, PMHx CAD, DM, HTN, CHF 25%, s/p Right TKR 10/19 c/b wound infection. s/p washout and PICC with IV Abx for MRSA - last day 1/15/20. Wound now with marginal necrosis and superficial dehiscence.

## 2020-01-17 NOTE — PROGRESS NOTE ADULT - SUBJECTIVE AND OBJECTIVE BOX
INTERVAL HPI/OVERNIGHT EVENTS:    Patient is a 64y old  Male who presents with a chief complaint of R knee incision debridement (17 Jan 2020 19:00)      Pt reports the following symptoms:    CONSTITUTIONAL:  Negative fever or chills, feels well, good appetite  EYES:  Negative  blurry vision or double vision  CARDIOVASCULAR:  Negative for chest pain or palpitations  RESPIRATORY:  Negative for cough, wheezing, or SOB   GASTROINTESTINAL:  Negative for nausea, vomiting, diarrhea, constipation, or abdominal pain  GENITOURINARY:  Negative frequency, urgency or dysuria  NEUROLOGIC:  No headache, confusion, dizziness, lightheadedness    MEDICATIONS  (STANDING):  aspirin enteric coated 81 milliGRAM(s) Oral daily  atorvastatin 40 milliGRAM(s) Oral at bedtime  dextrose 5%. 1000 milliLiter(s) (50 mL/Hr) IV Continuous <Continuous>  dextrose 50% Injectable 12.5 Gram(s) IV Push once  dextrose 50% Injectable 25 Gram(s) IV Push once  dextrose 50% Injectable 25 Gram(s) IV Push once  digoxin     Tablet 0.125 milliGRAM(s) Oral daily  doxycycline hyclate Capsule 100 milliGRAM(s) Oral every 12 hours  DULoxetine 90 milliGRAM(s) Oral daily  insulin aspart (NovoLOG) Pump 1 Each SubCutaneous Continuous Pump  metoprolol succinate  milliGRAM(s) Oral daily  pantoprazole    Tablet 40 milliGRAM(s) Oral before breakfast  prasugrel 10 milliGRAM(s) Oral daily    MEDICATIONS  (PRN):  dextrose 40% Gel 15 Gram(s) Oral once PRN Blood Glucose LESS THAN 70 milliGRAM(s)/deciLiter  glucagon  Injectable 1 milliGRAM(s) IntraMuscular once PRN Glucose <70 milliGRAM(s)/deciLiter  HYDROmorphone  Injectable 0.5 milliGRAM(s) IV Push every 6 hours PRN breakthrough pain  ondansetron Injectable 4 milliGRAM(s) IV Push every 6 hours PRN Nausea and/or Vomiting  oxycodone    5 mG/acetaminophen 325 mG 2 Tablet(s) Oral every 6 hours PRN Severe Pain (7 - 10)  oxycodone    5 mG/acetaminophen 325 mG 1 Tablet(s) Oral every 4 hours PRN Moderate Pain (4 - 6)      PHYSICAL EXAM  Vital Signs Last 24 Hrs  T(C): 36.7 (17 Jan 2020 12:57), Max: 36.8 (16 Jan 2020 21:44)  T(F): 98 (17 Jan 2020 12:57), Max: 98.2 (16 Jan 2020 21:44)  HR: 97 (17 Jan 2020 12:57) (75 - 99)  BP: 99/82 (17 Jan 2020 12:57) (99/82 - 128/88)  BP(mean): 96 (16 Jan 2020 21:17) (96 - 103)  RR: 16 (17 Jan 2020 12:57) (12 - 20)  SpO2: 95% (17 Jan 2020 12:57) (95% - 100%)    Constitutional: wn/wd in NAD.   HEENT: NCAT, MMM, OP clear, EOMI, no proptosis or lid retraction  Neck: no thyromegaly or palpable thyroid nodules   Respiratory: lungs CTAB.  Cardiovascular: regular rhythm, normal S1 and S2, no audible murmurs, no peripheral edema  GI: soft, NT/ND, no masses/HSM appreciated.  Neurology: no tremors, DTR 2+  Skin: no visible rashes/lesions  Psychiatric: AAO x 3, normal affect/mood.    LABS:                        11.0   8.58  )-----------( 336      ( 17 Jan 2020 06:27 )             35.8     01-17    138  |  102  |  30<H>  ----------------------------<  41<LL>  3.5   |  22  |  1.01    Ca    8.8      17 Jan 2020 06:27  Phos  3.7     01-17  Mg     2.0     01-17              HbA1C: 7.5 % (10-22 @ 07:00)    CAPILLARY BLOOD GLUCOSE      POCT Blood Glucose.: 171 mg/dL (17 Jan 2020 16:25)  POCT Blood Glucose.: 153 mg/dL (17 Jan 2020 07:17)  POCT Blood Glucose.: 45 mg/dL (17 Jan 2020 06:50)  POCT Blood Glucose.: 75 mg/dL (16 Jan 2020 23:49)      Insulin Sliding Scale requirements X 24 Hours:    RADIOLOGY & ADDITIONAL TESTS:    A/P: 64y Male with history of DM type II presenting for       1.  DM -     Please continue           units lantus at bedtime  / in the morning and        units lispro with meals and lispro moderate / low dose sliding scale 4 times daily with meals and at bedtime.  Please continue consistent carbohydrate diet.      Goal FSG is   Will continue to monitor   For discharge, pt can continue    Pt can follow up at discharge with [facility] by calling  to make an appointment.   Will discuss case with     and update primary team INTERVAL HPI/OVERNIGHT EVENTS:    Patient seen and examined at the bedside. Patient has not calibrated his freestyle sensor today. So, there was no data available to see his FSG trend. he did have a hypoglycemic event today morning while he was on insulin pump. The basal rate in the pump was running at 2 units/hr. he suspended his insulin pump in the afternoon for an hour while he was taking shower    FSG & Insulin received:  Yesterday:  Lunch fs, 2 units bolus  bedtime fs    Today:  pre-breakfast fs, was given juice and D50, repeat FSg was 153.     Pt reports the following symptoms:  CONSTITUTIONAL:  Negative fever or chills  CARDIOVASCULAR:  Negative for chest pain or palpitations  RESPIRATORY:  Negative for cough, wheezing, or SOB   GASTROINTESTINAL:  Negative for diarrhea, constipation, or abdominal pain  NEUROLOGIC:  No headache, confusion, dizziness, lightheadedness    MEDICATIONS  (STANDING):  aspirin enteric coated 81 milliGRAM(s) Oral daily  atorvastatin 40 milliGRAM(s) Oral at bedtime  dextrose 5%. 1000 milliLiter(s) (50 mL/Hr) IV Continuous <Continuous>  dextrose 50% Injectable 12.5 Gram(s) IV Push once  dextrose 50% Injectable 25 Gram(s) IV Push once  dextrose 50% Injectable 25 Gram(s) IV Push once  digoxin     Tablet 0.125 milliGRAM(s) Oral daily  doxycycline hyclate Capsule 100 milliGRAM(s) Oral every 12 hours  DULoxetine 90 milliGRAM(s) Oral daily  insulin aspart (NovoLOG) Pump 1 Each SubCutaneous Continuous Pump  metoprolol succinate  milliGRAM(s) Oral daily  pantoprazole    Tablet 40 milliGRAM(s) Oral before breakfast  prasugrel 10 milliGRAM(s) Oral daily    MEDICATIONS  (PRN):  dextrose 40% Gel 15 Gram(s) Oral once PRN Blood Glucose LESS THAN 70 milliGRAM(s)/deciLiter  glucagon  Injectable 1 milliGRAM(s) IntraMuscular once PRN Glucose <70 milliGRAM(s)/deciLiter  HYDROmorphone  Injectable 0.5 milliGRAM(s) IV Push every 6 hours PRN breakthrough pain  ondansetron Injectable 4 milliGRAM(s) IV Push every 6 hours PRN Nausea and/or Vomiting  oxycodone    5 mG/acetaminophen 325 mG 2 Tablet(s) Oral every 6 hours PRN Severe Pain (7 - 10)  oxycodone    5 mG/acetaminophen 325 mG 1 Tablet(s) Oral every 4 hours PRN Moderate Pain (4 - 6)      PHYSICAL EXAM  Vital Signs Last 24 Hrs  T(C): 36.7 (2020 12:57), Max: 36.8 (2020 21:44)  T(F): 98 (2020 12:57), Max: 98.2 (2020 21:44)  HR: 97 (2020 12:57) (75 - 99)  BP: 99/82 (2020 12:57) (99/82 - 128/88)  BP(mean): 96 (2020 21:17) (96 - 103)  RR: 16 (2020 12:57) (12 - 20)  SpO2: 95% (2020 12:57) (95% - 100%)    Constitutional: wn/wd in NAD.   Respiratory: lungs CTAB.  Cardiovascular: regular rhythm, normal S1 and S2,  no peripheral edema  GI: soft, NT/ND, bowel sounds  Neurology: no tremors, DTR 2+      LABS:                        11.0   8.58  )-----------( 336      ( 2020 06:27 )             35.8     01-17    138  |  102  |  30<H>  ----------------------------<  41<LL>  3.5   |  22  |  1.01    Ca    8.8      2020 06:27  Phos  3.7     -17  Mg     2.0     -              HbA1C: 7.5 % (10-22 @ 07:00)    CAPILLARY BLOOD GLUCOSE      POCT Blood Glucose.: 171 mg/dL (2020 16:25)  POCT Blood Glucose.: 153 mg/dL (2020 07:17)  POCT Blood Glucose.: 45 mg/dL (2020 06:50)  POCT Blood Glucose.: 75 mg/dL (2020 23:49)      Insulin Sliding Scale requirements X 24 Hours:    RADIOLOGY & ADDITIONAL TESTS:    A/P: Patient is 63 y/o maleDM, CVT, CHF, HLD, CAD with right knee OA s/p right total knee arthroplasty s/p wound debridement and wound vac placement.    1.  DM Type 2 - controlled - with neuropathy and retinopathy  Hba1c 7.5  Cr/GFR 0.97/97  MUGA scan showed EF 26%  Wt 104.5 kg with BMI 30.4  Patient can continue to be on insulin pump - Patient signed the insulin pump policy.  We will decrease the basal rate to 1.7 units/hr on insulin pump. The changes were made in the insulin pump   He can give 2 units bolus via the insulin pump for each meal ( He is not doing ICR or ISF ratio)  PLease check FSG AC and HS  Will continue to monitor     2. Hyperlipidemia  - Goal LDL < 70  - Continue statin.    For discharge, Patient can be discharged on insulin pump    Pt can follow up at discharge with , [facility] by calling  to make an appointment.   Discussed case with

## 2020-01-17 NOTE — H&P ADULT - NSHPPHYSICALEXAM_GEN_ALL_CORE
Vital Signs Last 24 Hrs  T(C): 36.4 (17 Jan 2020 05:35), Max: 36.8 (16 Jan 2020 21:44)  T(F): 97.6 (17 Jan 2020 05:35), Max: 98.2 (16 Jan 2020 21:44)  HR: 75 (17 Jan 2020 05:35) (75 - 99)  BP: 125/72 (17 Jan 2020 05:35) (112/77 - 137/68)  BP(mean): 96 (16 Jan 2020 21:17) (87 - 103)  RR: 17 (17 Jan 2020 05:35) (12 - 26)  SpO2: 96% (17 Jan 2020 05:35) (92% - 100%)  CAPILLARY BLOOD GLUCOSE      POCT Blood Glucose.: 153 mg/dL (17 Jan 2020 07:17)  POCT Blood Glucose.: 45 mg/dL (17 Jan 2020 06:50)  POCT Blood Glucose.: 75 mg/dL (16 Jan 2020 23:49)  POCT Blood Glucose.: 206 mg/dL (16 Jan 2020 13:57)  POCT Blood Glucose.: 324 mg/dL (16 Jan 2020 11:30)      01-16 @ 07:01  -  01-17 @ 07:00  --------------------------------------------------------  IN: 0 mL / OUT: 545 mL / NET: -545 mL        Physical Exam:      General:  Well appearing, NAD  CV:  RRR  Lungs:  nonlabored breathing  Abdomen:  Soft, non-tender, no distended  Extremities:  RLE- Vac intact and functioning  Neuro:  AAOx3

## 2020-01-17 NOTE — PROGRESS NOTE ADULT - PROBLEM SELECTOR PLAN 1
1) Continue Doxycycline 100md bid x 2 weeks( until R knee wound closes)  2) Wound culture was not sent.  3) Check ESR, CRP in AM  4) PICC line out

## 2020-01-17 NOTE — H&P ADULT - NSHPLABSRESULTS_GEN_ALL_CORE
LABS:                        11.0   8.58  )-----------( 336      ( 17 Jan 2020 06:27 )             35.8     01-17    138  |  102  |  30<H>  ----------------------------<  41<LL>  3.5   |  22  |  1.01    Ca    8.8      17 Jan 2020 06:27  Phos  3.7     01-17  Mg     2.0     01-17

## 2020-01-17 NOTE — H&P ADULT - NSICDXPASTSURGICALHX_GEN_ALL_CORE_FT
PAST SURGICAL HISTORY:  Other postprocedural status Fixation hardware in foot LEFT    S/P CABG x 1 2018    S/P TKR (total knee replacement), right with infection Mrsa   per pt he was cleared from MRSA infection    Stented coronary artery 10/18 heart attack  INFERIOR WALL MI    Surgery, elective right knee wound debridement    Surgery, elective Right shoulder

## 2020-01-18 ENCOUNTER — TRANSCRIPTION ENCOUNTER (OUTPATIENT)
Age: 65
End: 2020-01-18

## 2020-01-18 VITALS
OXYGEN SATURATION: 100 % | RESPIRATION RATE: 18 BRPM | HEART RATE: 74 BPM | SYSTOLIC BLOOD PRESSURE: 100 MMHG | DIASTOLIC BLOOD PRESSURE: 63 MMHG | TEMPERATURE: 98 F

## 2020-01-18 LAB
GLUCOSE BLDC GLUCOMTR-MCNC: 173 MG/DL — HIGH (ref 70–99)
GLUCOSE BLDC GLUCOMTR-MCNC: 238 MG/DL — HIGH (ref 70–99)

## 2020-01-18 PROCEDURE — 88304 TISSUE EXAM BY PATHOLOGIST: CPT

## 2020-01-18 PROCEDURE — 85027 COMPLETE CBC AUTOMATED: CPT

## 2020-01-18 PROCEDURE — 11042 DBRDMT SUBQ TIS 1ST 20SQCM/<: CPT

## 2020-01-18 PROCEDURE — 74019 RADEX ABDOMEN 2 VIEWS: CPT

## 2020-01-18 PROCEDURE — 11045 DBRDMT SUBQ TISS EACH ADDL: CPT

## 2020-01-18 PROCEDURE — 82962 GLUCOSE BLOOD TEST: CPT

## 2020-01-18 PROCEDURE — 36415 COLL VENOUS BLD VENIPUNCTURE: CPT

## 2020-01-18 PROCEDURE — 80048 BASIC METABOLIC PNL TOTAL CA: CPT

## 2020-01-18 PROCEDURE — 74019 RADEX ABDOMEN 2 VIEWS: CPT | Mod: 26

## 2020-01-18 PROCEDURE — 83036 HEMOGLOBIN GLYCOSYLATED A1C: CPT

## 2020-01-18 PROCEDURE — 83735 ASSAY OF MAGNESIUM: CPT

## 2020-01-18 PROCEDURE — 84100 ASSAY OF PHOSPHORUS: CPT

## 2020-01-18 RX ORDER — METOPROLOL TARTRATE 50 MG
3 TABLET ORAL
Qty: 0 | Refills: 0 | DISCHARGE
Start: 2020-01-18

## 2020-01-18 RX ORDER — ATORVASTATIN CALCIUM 80 MG/1
1 TABLET, FILM COATED ORAL
Qty: 0 | Refills: 0 | DISCHARGE
Start: 2020-01-18

## 2020-01-18 RX ORDER — ONDANSETRON 8 MG/1
1 TABLET, FILM COATED ORAL
Qty: 56 | Refills: 0
Start: 2020-01-18 | End: 2020-01-31

## 2020-01-18 RX ORDER — ACETAMINOPHEN 500 MG
650 TABLET ORAL EVERY 4 HOURS
Refills: 0 | Status: DISCONTINUED | OUTPATIENT
Start: 2020-01-18 | End: 2020-01-18

## 2020-01-18 RX ORDER — ASPIRIN/CALCIUM CARB/MAGNESIUM 324 MG
1 TABLET ORAL
Qty: 0 | Refills: 0 | DISCHARGE
Start: 2020-01-18

## 2020-01-18 RX ORDER — PRASUGREL 5 MG/1
1 TABLET, FILM COATED ORAL
Qty: 0 | Refills: 0 | DISCHARGE
Start: 2020-01-18

## 2020-01-18 RX ORDER — DULOXETINE HYDROCHLORIDE 30 MG/1
3 CAPSULE, DELAYED RELEASE ORAL
Qty: 0 | Refills: 0 | DISCHARGE
Start: 2020-01-18

## 2020-01-18 RX ORDER — METOPROLOL TARTRATE 50 MG
1 TABLET ORAL
Qty: 0 | Refills: 0 | DISCHARGE
Start: 2020-01-18

## 2020-01-18 RX ADMIN — OXYCODONE AND ACETAMINOPHEN 1 TABLET(S): 5; 325 TABLET ORAL at 07:30

## 2020-01-18 RX ADMIN — Medication 0.12 MILLIGRAM(S): at 06:55

## 2020-01-18 RX ADMIN — ONDANSETRON 4 MILLIGRAM(S): 8 TABLET, FILM COATED ORAL at 08:32

## 2020-01-18 RX ADMIN — Medication 100 MILLIGRAM(S): at 06:57

## 2020-01-18 RX ADMIN — Medication 100 MILLIGRAM(S): at 06:56

## 2020-01-18 RX ADMIN — OXYCODONE AND ACETAMINOPHEN 1 TABLET(S): 5; 325 TABLET ORAL at 00:44

## 2020-01-18 RX ADMIN — PANTOPRAZOLE SODIUM 40 MILLIGRAM(S): 20 TABLET, DELAYED RELEASE ORAL at 06:56

## 2020-01-18 RX ADMIN — OXYCODONE AND ACETAMINOPHEN 1 TABLET(S): 5; 325 TABLET ORAL at 06:55

## 2020-01-18 RX ADMIN — OXYCODONE AND ACETAMINOPHEN 1 TABLET(S): 5; 325 TABLET ORAL at 01:07

## 2020-01-18 NOTE — PROGRESS NOTE ADULT - SUBJECTIVE AND OBJECTIVE BOX
O/N: VSS, N,V ->Reglan & Protonix                                A/P: 64yMale s/p Right knee wound debridement and VAC placement 1/16    Diet: Regular   Pain/nausea control  DVT ppx: ASA/ Effient   Dispo plan: SW and  to work on obtaining home VAC and Nursing for VAC change at home   ID: Dr. Saeed consulted and recommended Doxycycline 100mg BID x2 weeks O/N: VSS, N,V ->Reglan & Protonix        doxycycline hyclate Capsule 100  aspirin enteric coated 81  digoxin     Tablet 0.125  doxycycline hyclate Capsule 100  metoprolol succinate   prasugrel 10        Vital Signs Last 24 Hrs  T(C): 36.6 (18 Jan 2020 05:56), Max: 36.7 (17 Jan 2020 12:57)  T(F): 97.8 (18 Jan 2020 05:56), Max: 98.1 (17 Jan 2020 21:22)  HR: 75 (18 Jan 2020 05:56) (75 - 97)  BP: 106/70 (18 Jan 2020 05:56) (99/82 - 107/69)  BP(mean): --  RR: 17 (18 Jan 2020 05:56) (16 - 18)  SpO2: 94% (18 Jan 2020 05:56) (94% - 100%)  I&O's Summary    17 Jan 2020 07:01  -  18 Jan 2020 07:00  --------------------------------------------------------  IN: 360 mL / OUT: 900 mL / NET: -540 mL        Physical Exam:    General:  Well appearing, NAD  CV:  RRR  Lungs:  nonlabored breathing  Abdomen:  Soft, non-tender, no distended  Extremities:  RLE- Vac intact and functioning  Neuro:  AAOx3        LABS:                        11.0   8.58  )-----------( 336      ( 17 Jan 2020 06:27 )             35.8     01-17    138  |  102  |  30<H>  ----------------------------<  41<LL>  3.5   |  22  |  1.01    Ca    8.8      17 Jan 2020 06:27  Phos  3.7     01-17  Mg     2.0     01-17        PLEASE CHECK WHEN PRESENT:     [  ] Heart Failure     [  ] Acute     [  ] Acute on Chronic     [X  ] Chronic  -------------------------------------------------------------------     [  ]Diastolic [HFpEF]     [X  ]Systolic [HFrEF]     [  ]Combined [HFpEF & HFrEF]     [X  ]Other: HTN  -------------------------------------------------------------------  [ ] Respiratory failure  [ ] Acute cor pulmonale  [ ] Asthma/COPD Exacerbation  [ ] Pleural effusion  [ ] Aspiration pneumonia  -------------------------------------------------------------------  [  ]MOISES     [  ]ATN     [  ]Reneal Medullary Necrosis     [  ]Renal Cortical Necrosis     [  ]Other Pathological Lesions:    [  ]CKD 1  [  ]CKD 2  [  ]CKD 3  [  ]CKD 4  [  ]CKD 5  [  ]Other  -------------------------------------------------------------------  [X  ]Diabetes  [  ] Diabetic PVD Ulcer  [  ] Neuropathic ulcer to DM  [ X ] Diabetes with Nephropathy  [  ] Osteomyelitis due to diabetes  --------------------------------------------------------------------  [  ]Malnutrition: See Nutrition Note  [  ]Cachexia  [  ]Other:   [  ]Supplement Ordered:  [  ]Morbid Obesity (BMI >=40]  ---------------------------------------------------------------------  [ ] Sepsis/severe sepsis/septic shock  [ ] UTI  [ ] Pneumonia  -----------------------------------------------------------------------  [ ] Acidosis/alkalosis  [ ] Fluid overload  [ ] Hypokalemia  [ ] Hyperkalemia  [ ] Hypomagnesemia  [ ] Hypophosphatemia  [ ] Hyperphosphatemia  ------------------------------------------------------------------------  [ ] Acute blood loss anemia  [ ] Post op blood loss anemia  [ ] Iron deficiency anemia  [ ] Anemia due to chronic disease  [ ] Hypercoagulable state  ----------------------------------------------------------------------  [ ] Cerebral infarction  [ ] Transient ischemia attack  [ ] Encephalopathy            A/P: 64yMale s/p Right knee wound debridement and VAC placement 1/16    Diet: Regular   Pain/nausea control  DVT ppx: ASA/ Effient   Dispo plan: SW and  to work on obtaining home VAC and Nursing for VAC change at home   ID: Dr. Saeed consulted and recommended Doxycycline 100mg BID x2 weeks

## 2020-01-18 NOTE — DISCHARGE NOTE PROVIDER - NSFOLLOWUPCLINICS_GEN_ALL_ED_FT
[facility]  Gastroenterology  [address] [place] [id number]  Phone: [phone]  Fax:   Follow Up Time: 1 week

## 2020-01-18 NOTE — DISCHARGE NOTE PROVIDER - NSDCACTIVITY_GEN_ALL_CORE
Stairs allowed/Walking - Indoors allowed/Sex allowed/Walking - Outdoors allowed/Driving allowed/Showering allowed/No heavy lifting/straining/Return to Work/School allowed

## 2020-01-18 NOTE — DISCHARGE NOTE PROVIDER - NSDCFUADDINST_GEN_ALL_CORE_FT
Please take Doxycycline twice a day for two weeks, as prescribed.     You may shower with gentle soap. Allow the water to gently flow over wound. Do not scrub and pat dry.    Ostomy care nursing has been set up to help you with ostomy care. Continue to care for ostomy as instructed by inpatient and outpatient ostomy care nursing.

## 2020-01-18 NOTE — DISCHARGE NOTE PROVIDER - HOSPITAL COURSE
65 y/o M, PMHx CAD s/p CABG and PCI on DPT, DM (insulin pump) CHF 25%,  s/p Right TKR 10/19 c/b wound infection. s/p washout and PICC with IV Abx for MRSA - last day 1/15/20. Now with margianal necrosis and superficial dehiscence, Patient was admitted for his knee wound and is now s/p R knee wound debridement and VAC placement. Home VAC supplies have been provided and services established for the patient. The first of which has been placed on the patient's knee. His post-op course was without complications and the patient remained stable and fully functional at the time of discharge.

## 2020-01-18 NOTE — DISCHARGE NOTE PROVIDER - NSDCFUADDAPPT_GEN_ALL_CORE_FT
Please make an appointment with the Gastroenterology clinic to be evaluated for your nausea, and the possibility of gastroparesis.     Please follow up with your endocrinologist to ensure proper care of your diabetes.

## 2020-01-18 NOTE — DISCHARGE NOTE PROVIDER - CARE PROVIDER_API CALL
Noel Malloy)  Surgery; Vascular Surgery  [address] [place] [address]  Phone: [phone]  Fax: [phone]  Follow Up Time: 1 week

## 2020-01-18 NOTE — DISCHARGE NOTE PROVIDER - NSDCMRMEDTOKEN_GEN_ALL_CORE_FT
aspirin 81 mg oral delayed release tablet: 1 tab(s) orally once a day  atorvastatin 40 mg oral tablet: 1 tab(s) orally once a day (at bedtime)  doxycycline hyclate 100 mg oral capsule: 1 cap(s) orally 2 times a day   DULoxetine 30 mg oral delayed release capsule: 3 cap(s) orally once a day  metoprolol succinate 100 mg oral tablet, extended release: 1 tab(s) orally once a day  prasugrel 10 mg oral tablet: 1 tab(s) orally once a day  Zofran 8 mg oral tablet: 1 tab(s) orally every 6 hours

## 2020-01-18 NOTE — DISCHARGE NOTE PROVIDER - NSDCCPCAREPLAN_GEN_ALL_CORE_FT
PRINCIPAL DISCHARGE DIAGNOSIS  Diagnosis: Wound dehiscence  Assessment and Plan of Treatment: Wound dehiscence

## 2020-01-18 NOTE — DISCHARGE NOTE NURSING/CASE MANAGEMENT/SOCIAL WORK - PATIENT PORTAL LINK FT
You can access the FollowMyHealth Patient Portal offered by [facility] by registering at the following website: [email or web address]. By joining [facility]’s FollowMyHealth portal, you will also be able to view your health information using other applications (apps) compatible with our system.

## 2020-01-19 ENCOUNTER — FORM ENCOUNTER (OUTPATIENT)
Age: 65
End: 2020-01-19

## 2020-01-21 PROBLEM — G89.18 POST-OP PAIN: Status: ACTIVE | Noted: 2020-01-21

## 2020-01-21 LAB — SURGICAL PATHOLOGY STUDY: SIGNIFICANT CHANGE UP

## 2020-01-22 DIAGNOSIS — I73.9 PERIPHERAL VASCULAR DISEASE, UNSPECIFIED: ICD-10-CM

## 2020-01-22 RX ORDER — PANTOPRAZOLE 40 MG/1
40 TABLET, DELAYED RELEASE ORAL DAILY
Qty: 30 | Refills: 0 | Status: DISCONTINUED | COMMUNITY
Start: 2019-10-21 | End: 2020-01-22

## 2020-01-22 RX ORDER — EZETIMIBE 10 MG/1
10 TABLET ORAL
Refills: 0 | Status: DISCONTINUED | COMMUNITY
End: 2020-01-22

## 2020-01-22 RX ORDER — SILVER SULFADIAZINE 10 MG/G
1 CREAM TOPICAL TWICE DAILY
Qty: 1 | Refills: 1 | Status: DISCONTINUED | COMMUNITY
Start: 2019-11-19 | End: 2020-01-22

## 2020-01-22 RX ORDER — SILVER SULFADIAZINE 10 MG/G
1 CREAM TOPICAL TWICE DAILY
Qty: 1 | Refills: 1 | Status: DISCONTINUED | COMMUNITY
Start: 2019-12-10 | End: 2020-01-22

## 2020-01-22 RX ORDER — CEFADROXIL 500 MG/1
500 CAPSULE ORAL TWICE DAILY
Qty: 14 | Refills: 0 | Status: DISCONTINUED | COMMUNITY
Start: 2019-10-21 | End: 2020-01-22

## 2020-01-22 RX ORDER — CELECOXIB 100 MG/1
100 CAPSULE ORAL TWICE DAILY
Qty: 84 | Refills: 0 | Status: DISCONTINUED | COMMUNITY
Start: 2019-10-21 | End: 2020-01-22

## 2020-01-27 PROBLEM — I21.3 ST ELEVATION (STEMI) MYOCARDIAL INFARCTION OF UNSPECIFIED SITE: Chronic | Status: ACTIVE | Noted: 2020-01-15

## 2020-01-27 PROBLEM — K57.92 DIVERTICULITIS OF INTESTINE, PART UNSPECIFIED, WITHOUT PERFORATION OR ABSCESS WITHOUT BLEEDING: Chronic | Status: ACTIVE | Noted: 2020-01-15

## 2020-01-31 ENCOUNTER — INPATIENT (INPATIENT)
Facility: HOSPITAL | Age: 65
LOS: 13 days | Discharge: NURSING, REHAB OR LONG-TERM CARE | End: 2020-02-14
Attending: INTERNAL MEDICINE | Admitting: INTERNAL MEDICINE
Payer: COMMERCIAL

## 2020-01-31 VITALS
WEIGHT: 220.02 LBS | OXYGEN SATURATION: 100 % | HEIGHT: 73 IN | RESPIRATION RATE: 18 BRPM | HEART RATE: 97 BPM | SYSTOLIC BLOOD PRESSURE: 135 MMHG | DIASTOLIC BLOOD PRESSURE: 78 MMHG | TEMPERATURE: 98 F

## 2020-01-31 DIAGNOSIS — Z95.5 PRESENCE OF CORONARY ANGIOPLASTY IMPLANT AND GRAFT: Chronic | ICD-10-CM

## 2020-01-31 DIAGNOSIS — Z41.9 ENCOUNTER FOR PROCEDURE FOR PURPOSES OTHER THAN REMEDYING HEALTH STATE, UNSPECIFIED: Chronic | ICD-10-CM

## 2020-01-31 DIAGNOSIS — Z95.1 PRESENCE OF AORTOCORONARY BYPASS GRAFT: Chronic | ICD-10-CM

## 2020-01-31 DIAGNOSIS — Z96.651 PRESENCE OF RIGHT ARTIFICIAL KNEE JOINT: Chronic | ICD-10-CM

## 2020-01-31 DIAGNOSIS — S72.141A DISPLACED INTERTROCHANTERIC FRACTURE OF RIGHT FEMUR, INITIAL ENCOUNTER FOR CLOSED FRACTURE: ICD-10-CM

## 2020-01-31 LAB
ALBUMIN SERPL ELPH-MCNC: 3.7 G/DL — SIGNIFICANT CHANGE UP (ref 3.5–5.2)
ALP SERPL-CCNC: 91 U/L — SIGNIFICANT CHANGE UP (ref 30–115)
ALT FLD-CCNC: 58 U/L — HIGH (ref 0–41)
ANION GAP SERPL CALC-SCNC: 13 MMOL/L — SIGNIFICANT CHANGE UP (ref 7–14)
APTT BLD: 35.5 SEC — SIGNIFICANT CHANGE UP (ref 27–39.2)
AST SERPL-CCNC: 35 U/L — SIGNIFICANT CHANGE UP (ref 0–41)
BASOPHILS # BLD AUTO: 0.04 K/UL — SIGNIFICANT CHANGE UP (ref 0–0.2)
BASOPHILS # BLD AUTO: 0.05 K/UL — SIGNIFICANT CHANGE UP (ref 0–0.2)
BASOPHILS NFR BLD AUTO: 0.5 % — SIGNIFICANT CHANGE UP (ref 0–1)
BASOPHILS NFR BLD AUTO: 0.5 % — SIGNIFICANT CHANGE UP (ref 0–1)
BILIRUB SERPL-MCNC: 0.9 MG/DL — SIGNIFICANT CHANGE UP (ref 0.2–1.2)
BLD GP AB SCN SERPL QL: SIGNIFICANT CHANGE UP
BUN SERPL-MCNC: 32 MG/DL — HIGH (ref 10–20)
CALCIUM SERPL-MCNC: 8.9 MG/DL — SIGNIFICANT CHANGE UP (ref 8.5–10.1)
CHLORIDE SERPL-SCNC: 100 MMOL/L — SIGNIFICANT CHANGE UP (ref 98–110)
CO2 SERPL-SCNC: 23 MMOL/L — SIGNIFICANT CHANGE UP (ref 17–32)
CREAT SERPL-MCNC: 1 MG/DL — SIGNIFICANT CHANGE UP (ref 0.7–1.5)
EOSINOPHIL # BLD AUTO: 0.06 K/UL — SIGNIFICANT CHANGE UP (ref 0–0.7)
EOSINOPHIL # BLD AUTO: 0.2 K/UL — SIGNIFICANT CHANGE UP (ref 0–0.7)
EOSINOPHIL NFR BLD AUTO: 0.7 % — SIGNIFICANT CHANGE UP (ref 0–8)
EOSINOPHIL NFR BLD AUTO: 1.8 % — SIGNIFICANT CHANGE UP (ref 0–8)
GLUCOSE BLDC GLUCOMTR-MCNC: 119 MG/DL — HIGH (ref 70–99)
GLUCOSE BLDC GLUCOMTR-MCNC: 137 MG/DL — HIGH (ref 70–99)
GLUCOSE BLDC GLUCOMTR-MCNC: 85 MG/DL — SIGNIFICANT CHANGE UP (ref 70–99)
GLUCOSE BLDC GLUCOMTR-MCNC: 88 MG/DL — SIGNIFICANT CHANGE UP (ref 70–99)
GLUCOSE BLDC GLUCOMTR-MCNC: 99 MG/DL — SIGNIFICANT CHANGE UP (ref 70–99)
GLUCOSE SERPL-MCNC: 130 MG/DL — HIGH (ref 70–99)
HCT VFR BLD CALC: 31.9 % — LOW (ref 42–52)
HCT VFR BLD CALC: 37.7 % — LOW (ref 42–52)
HGB BLD-MCNC: 11.2 G/DL — LOW (ref 14–18)
HGB BLD-MCNC: 9.6 G/DL — LOW (ref 14–18)
IMM GRANULOCYTES NFR BLD AUTO: 0.4 % — HIGH (ref 0.1–0.3)
IMM GRANULOCYTES NFR BLD AUTO: 0.5 % — HIGH (ref 0.1–0.3)
INR BLD: 1.43 RATIO — HIGH (ref 0.65–1.3)
LYMPHOCYTES # BLD AUTO: 0.52 K/UL — LOW (ref 1.2–3.4)
LYMPHOCYTES # BLD AUTO: 0.53 K/UL — LOW (ref 1.2–3.4)
LYMPHOCYTES # BLD AUTO: 4.8 % — LOW (ref 20.5–51.1)
LYMPHOCYTES # BLD AUTO: 6.4 % — LOW (ref 20.5–51.1)
MCHC RBC-ENTMCNC: 21.7 PG — LOW (ref 27–31)
MCHC RBC-ENTMCNC: 22.2 PG — LOW (ref 27–31)
MCHC RBC-ENTMCNC: 29.7 G/DL — LOW (ref 32–37)
MCHC RBC-ENTMCNC: 30.1 G/DL — LOW (ref 32–37)
MCV RBC AUTO: 73.2 FL — LOW (ref 80–94)
MCV RBC AUTO: 73.8 FL — LOW (ref 80–94)
MONOCYTES # BLD AUTO: 0.87 K/UL — HIGH (ref 0.1–0.6)
MONOCYTES # BLD AUTO: 1 K/UL — HIGH (ref 0.1–0.6)
MONOCYTES NFR BLD AUTO: 10.5 % — HIGH (ref 1.7–9.3)
MONOCYTES NFR BLD AUTO: 9.2 % — SIGNIFICANT CHANGE UP (ref 1.7–9.3)
NEUTROPHILS # BLD AUTO: 6.78 K/UL — HIGH (ref 1.4–6.5)
NEUTROPHILS # BLD AUTO: 9.07 K/UL — HIGH (ref 1.4–6.5)
NEUTROPHILS NFR BLD AUTO: 81.5 % — HIGH (ref 42.2–75.2)
NEUTROPHILS NFR BLD AUTO: 83.2 % — HIGH (ref 42.2–75.2)
NRBC # BLD: 0 /100 WBCS — SIGNIFICANT CHANGE UP (ref 0–0)
NRBC # BLD: 0 /100 WBCS — SIGNIFICANT CHANGE UP (ref 0–0)
PLATELET # BLD AUTO: 258 K/UL — SIGNIFICANT CHANGE UP (ref 130–400)
PLATELET # BLD AUTO: 265 K/UL — SIGNIFICANT CHANGE UP (ref 130–400)
POTASSIUM SERPL-MCNC: 5.1 MMOL/L — HIGH (ref 3.5–5)
POTASSIUM SERPL-SCNC: 5.1 MMOL/L — HIGH (ref 3.5–5)
PROT SERPL-MCNC: 7 G/DL — SIGNIFICANT CHANGE UP (ref 6–8)
PROTHROM AB SERPL-ACNC: 16.5 SEC — HIGH (ref 9.95–12.87)
RBC # BLD: 4.32 M/UL — LOW (ref 4.7–6.1)
RBC # BLD: 5.15 M/UL — SIGNIFICANT CHANGE UP (ref 4.7–6.1)
RBC # FLD: 19.5 % — HIGH (ref 11.5–14.5)
RBC # FLD: 19.8 % — HIGH (ref 11.5–14.5)
SODIUM SERPL-SCNC: 136 MMOL/L — SIGNIFICANT CHANGE UP (ref 135–146)
TROPONIN T SERPL-MCNC: 0.02 NG/ML — HIGH
TROPONIN T SERPL-MCNC: 0.03 NG/ML — CRITICAL HIGH
WBC # BLD: 10.89 K/UL — HIGH (ref 4.8–10.8)
WBC # BLD: 8.31 K/UL — SIGNIFICANT CHANGE UP (ref 4.8–10.8)
WBC # FLD AUTO: 10.89 K/UL — HIGH (ref 4.8–10.8)
WBC # FLD AUTO: 8.31 K/UL — SIGNIFICANT CHANGE UP (ref 4.8–10.8)

## 2020-01-31 PROCEDURE — 99223 1ST HOSP IP/OBS HIGH 75: CPT

## 2020-01-31 PROCEDURE — 99285 EMERGENCY DEPT VISIT HI MDM: CPT

## 2020-01-31 PROCEDURE — 93010 ELECTROCARDIOGRAM REPORT: CPT

## 2020-01-31 PROCEDURE — 71045 X-RAY EXAM CHEST 1 VIEW: CPT | Mod: 26

## 2020-01-31 PROCEDURE — 73552 X-RAY EXAM OF FEMUR 2/>: CPT | Mod: 26,RT

## 2020-01-31 PROCEDURE — 73562 X-RAY EXAM OF KNEE 3: CPT | Mod: 26,RT

## 2020-01-31 PROCEDURE — 73502 X-RAY EXAM HIP UNI 2-3 VIEWS: CPT | Mod: 26,RT,76

## 2020-01-31 RX ORDER — DULOXETINE HYDROCHLORIDE 30 MG/1
90 CAPSULE, DELAYED RELEASE ORAL DAILY
Refills: 0 | Status: DISCONTINUED | OUTPATIENT
Start: 2020-01-31 | End: 2020-02-04

## 2020-01-31 RX ORDER — METOPROLOL TARTRATE 50 MG
150 TABLET ORAL DAILY
Refills: 0 | Status: DISCONTINUED | OUTPATIENT
Start: 2020-01-31 | End: 2020-01-31

## 2020-01-31 RX ORDER — SODIUM CHLORIDE 9 MG/ML
1000 INJECTION, SOLUTION INTRAVENOUS
Refills: 0 | Status: DISCONTINUED | OUTPATIENT
Start: 2020-01-31 | End: 2020-02-04

## 2020-01-31 RX ORDER — ATORVASTATIN CALCIUM 80 MG/1
40 TABLET, FILM COATED ORAL DAILY
Refills: 0 | Status: DISCONTINUED | OUTPATIENT
Start: 2020-01-31 | End: 2020-01-31

## 2020-01-31 RX ORDER — DEXTROSE 50 % IN WATER 50 %
25 SYRINGE (ML) INTRAVENOUS ONCE
Refills: 0 | Status: DISCONTINUED | OUTPATIENT
Start: 2020-01-31 | End: 2020-02-04

## 2020-01-31 RX ORDER — MORPHINE SULFATE 50 MG/1
2 CAPSULE, EXTENDED RELEASE ORAL EVERY 4 HOURS
Refills: 0 | Status: DISCONTINUED | OUTPATIENT
Start: 2020-01-31 | End: 2020-02-01

## 2020-01-31 RX ORDER — MORPHINE SULFATE 50 MG/1
4 CAPSULE, EXTENDED RELEASE ORAL EVERY 4 HOURS
Refills: 0 | Status: DISCONTINUED | OUTPATIENT
Start: 2020-01-31 | End: 2020-01-31

## 2020-01-31 RX ORDER — HYDROMORPHONE HYDROCHLORIDE 2 MG/ML
0.5 INJECTION INTRAMUSCULAR; INTRAVENOUS; SUBCUTANEOUS
Refills: 0 | Status: DISCONTINUED | OUTPATIENT
Start: 2020-01-31 | End: 2020-02-01

## 2020-01-31 RX ORDER — DEXTROSE 50 % IN WATER 50 %
12.5 SYRINGE (ML) INTRAVENOUS ONCE
Refills: 0 | Status: DISCONTINUED | OUTPATIENT
Start: 2020-01-31 | End: 2020-02-04

## 2020-01-31 RX ORDER — DULOXETINE HYDROCHLORIDE 30 MG/1
90 CAPSULE, DELAYED RELEASE ORAL DAILY
Refills: 0 | Status: DISCONTINUED | OUTPATIENT
Start: 2020-01-31 | End: 2020-01-31

## 2020-01-31 RX ORDER — SODIUM CHLORIDE 9 MG/ML
1000 INJECTION INTRAMUSCULAR; INTRAVENOUS; SUBCUTANEOUS
Refills: 0 | Status: DISCONTINUED | OUTPATIENT
Start: 2020-01-31 | End: 2020-02-03

## 2020-01-31 RX ORDER — OXYCODONE AND ACETAMINOPHEN 5; 325 MG/1; MG/1
1 TABLET ORAL EVERY 6 HOURS
Refills: 0 | Status: DISCONTINUED | OUTPATIENT
Start: 2020-01-31 | End: 2020-01-31

## 2020-01-31 RX ORDER — ENOXAPARIN SODIUM 100 MG/ML
40 INJECTION SUBCUTANEOUS DAILY
Refills: 0 | Status: DISCONTINUED | OUTPATIENT
Start: 2020-01-31 | End: 2020-02-04

## 2020-01-31 RX ORDER — ASPIRIN/CALCIUM CARB/MAGNESIUM 324 MG
81 TABLET ORAL DAILY
Refills: 0 | Status: DISCONTINUED | OUTPATIENT
Start: 2020-01-31 | End: 2020-02-04

## 2020-01-31 RX ORDER — CEFAZOLIN SODIUM 1 G
2000 VIAL (EA) INJECTION EVERY 8 HOURS
Refills: 0 | Status: COMPLETED | OUTPATIENT
Start: 2020-01-31 | End: 2020-02-01

## 2020-01-31 RX ORDER — GLUCAGON INJECTION, SOLUTION 0.5 MG/.1ML
1 INJECTION, SOLUTION SUBCUTANEOUS ONCE
Refills: 0 | Status: DISCONTINUED | OUTPATIENT
Start: 2020-01-31 | End: 2020-02-04

## 2020-01-31 RX ORDER — DIGOXIN 250 MCG
0.12 TABLET ORAL DAILY
Refills: 0 | Status: DISCONTINUED | OUTPATIENT
Start: 2020-01-31 | End: 2020-01-31

## 2020-01-31 RX ORDER — PANTOPRAZOLE SODIUM 20 MG/1
40 TABLET, DELAYED RELEASE ORAL
Refills: 0 | Status: DISCONTINUED | OUTPATIENT
Start: 2020-01-31 | End: 2020-01-31

## 2020-01-31 RX ORDER — DEXTROSE 50 % IN WATER 50 %
15 SYRINGE (ML) INTRAVENOUS ONCE
Refills: 0 | Status: DISCONTINUED | OUTPATIENT
Start: 2020-01-31 | End: 2020-02-04

## 2020-01-31 RX ORDER — PANTOPRAZOLE SODIUM 20 MG/1
40 TABLET, DELAYED RELEASE ORAL
Refills: 0 | Status: DISCONTINUED | OUTPATIENT
Start: 2020-01-31 | End: 2020-02-04

## 2020-01-31 RX ORDER — OXYCODONE HYDROCHLORIDE 5 MG/1
5 TABLET ORAL EVERY 4 HOURS
Refills: 0 | Status: DISCONTINUED | OUTPATIENT
Start: 2020-01-31 | End: 2020-02-04

## 2020-01-31 RX ORDER — FUROSEMIDE 40 MG
40 TABLET ORAL DAILY
Refills: 0 | Status: DISCONTINUED | OUTPATIENT
Start: 2020-01-31 | End: 2020-02-02

## 2020-01-31 RX ORDER — METOPROLOL TARTRATE 50 MG
150 TABLET ORAL DAILY
Refills: 0 | Status: DISCONTINUED | OUTPATIENT
Start: 2020-01-31 | End: 2020-02-04

## 2020-01-31 RX ORDER — FUROSEMIDE 40 MG
40 TABLET ORAL DAILY
Refills: 0 | Status: DISCONTINUED | OUTPATIENT
Start: 2020-01-31 | End: 2020-01-31

## 2020-01-31 RX ORDER — OXYCODONE HYDROCHLORIDE 5 MG/1
5 TABLET ORAL ONCE
Refills: 0 | Status: DISCONTINUED | OUTPATIENT
Start: 2020-01-31 | End: 2020-01-31

## 2020-01-31 RX ORDER — DIGOXIN 250 MCG
0.12 TABLET ORAL DAILY
Refills: 0 | Status: DISCONTINUED | OUTPATIENT
Start: 2020-01-31 | End: 2020-02-04

## 2020-01-31 RX ORDER — SODIUM CHLORIDE 9 MG/ML
1000 INJECTION, SOLUTION INTRAVENOUS
Refills: 0 | Status: DISCONTINUED | OUTPATIENT
Start: 2020-01-31 | End: 2020-02-01

## 2020-01-31 RX ORDER — ASPIRIN/CALCIUM CARB/MAGNESIUM 324 MG
81 TABLET ORAL DAILY
Refills: 0 | Status: DISCONTINUED | OUTPATIENT
Start: 2020-01-31 | End: 2020-01-31

## 2020-01-31 RX ORDER — MORPHINE SULFATE 50 MG/1
4 CAPSULE, EXTENDED RELEASE ORAL ONCE
Refills: 0 | Status: DISCONTINUED | OUTPATIENT
Start: 2020-01-31 | End: 2020-01-31

## 2020-01-31 RX ORDER — ATORVASTATIN CALCIUM 80 MG/1
40 TABLET, FILM COATED ORAL DAILY
Refills: 0 | Status: DISCONTINUED | OUTPATIENT
Start: 2020-01-31 | End: 2020-02-04

## 2020-01-31 RX ORDER — CHLORHEXIDINE GLUCONATE 213 G/1000ML
1 SOLUTION TOPICAL
Refills: 0 | Status: DISCONTINUED | OUTPATIENT
Start: 2020-01-31 | End: 2020-01-31

## 2020-01-31 RX ORDER — ACETAMINOPHEN 500 MG
650 TABLET ORAL EVERY 6 HOURS
Refills: 0 | Status: DISCONTINUED | OUTPATIENT
Start: 2020-01-31 | End: 2020-02-04

## 2020-01-31 RX ORDER — ENOXAPARIN SODIUM 100 MG/ML
40 INJECTION SUBCUTANEOUS DAILY
Refills: 0 | Status: DISCONTINUED | OUTPATIENT
Start: 2020-01-31 | End: 2020-01-31

## 2020-01-31 RX ORDER — ACETAMINOPHEN 500 MG
975 TABLET ORAL ONCE
Refills: 0 | Status: COMPLETED | OUTPATIENT
Start: 2020-01-31 | End: 2020-01-31

## 2020-01-31 RX ADMIN — Medication 100 MILLIGRAM(S): at 22:23

## 2020-01-31 RX ADMIN — MORPHINE SULFATE 4 MILLIGRAM(S): 50 CAPSULE, EXTENDED RELEASE ORAL at 02:50

## 2020-01-31 RX ADMIN — Medication 975 MILLIGRAM(S): at 02:50

## 2020-01-31 RX ADMIN — MORPHINE SULFATE 4 MILLIGRAM(S): 50 CAPSULE, EXTENDED RELEASE ORAL at 13:33

## 2020-01-31 RX ADMIN — OXYCODONE HYDROCHLORIDE 5 MILLIGRAM(S): 5 TABLET ORAL at 21:09

## 2020-01-31 RX ADMIN — HYDROMORPHONE HYDROCHLORIDE 0.5 MILLIGRAM(S): 2 INJECTION INTRAMUSCULAR; INTRAVENOUS; SUBCUTANEOUS at 20:00

## 2020-01-31 RX ADMIN — Medication 650 MILLIGRAM(S): at 19:10

## 2020-01-31 RX ADMIN — SODIUM CHLORIDE 50 MILLILITER(S): 9 INJECTION, SOLUTION INTRAVENOUS at 17:07

## 2020-01-31 RX ADMIN — SODIUM CHLORIDE 75 MILLILITER(S): 9 INJECTION INTRAMUSCULAR; INTRAVENOUS; SUBCUTANEOUS at 21:00

## 2020-01-31 RX ADMIN — OXYCODONE AND ACETAMINOPHEN 1 TABLET(S): 5; 325 TABLET ORAL at 10:13

## 2020-01-31 RX ADMIN — OXYCODONE HYDROCHLORIDE 5 MILLIGRAM(S): 5 TABLET ORAL at 22:48

## 2020-01-31 RX ADMIN — Medication 650 MILLIGRAM(S): at 18:22

## 2020-01-31 RX ADMIN — Medication 650 MILLIGRAM(S): at 23:02

## 2020-01-31 RX ADMIN — OXYCODONE HYDROCHLORIDE 5 MILLIGRAM(S): 5 TABLET ORAL at 23:20

## 2020-01-31 RX ADMIN — HYDROMORPHONE HYDROCHLORIDE 0.5 MILLIGRAM(S): 2 INJECTION INTRAMUSCULAR; INTRAVENOUS; SUBCUTANEOUS at 20:30

## 2020-01-31 RX ADMIN — OXYCODONE HYDROCHLORIDE 5 MILLIGRAM(S): 5 TABLET ORAL at 22:00

## 2020-01-31 NOTE — CONSULT NOTE ADULT - CONSULT REQUESTED DATE/TIME
Called and spoke with mother.   Shared he message from PCP.   Pharmacy verified. Rx sent   Mother  appreciated order and conversation.      20-Nov-2017 12:30

## 2020-01-31 NOTE — H&P ADULT - HISTORY OF PRESENT ILLNESS
The patient is a 64 year-old male with a PMH of CAD s/p CABG, HFrEF (25%), DM (on insulin pump), pulmonary hypertension, Celiac disease, diverticulitis, s/p right total knee replacement October 2019 complicated by infection s/p abx course w/ PICC, presenting following a mechanical fall. He reports that over the last several days he has been feeling more tired; last night he felt especially tired and sat down in the chair. After then getting up to go to the kitchen, he "fell asleep standing," landing on his right buttock. He reports he had been asleep for about 20 seconds. He denies any associated loss of bladder or bowel continence, dizziness or lightheadedness, nausea or vomiting prior to or after the episode. His wife then called EMS and he presented to the ED. On presentation, vitals were T 97.8 F, HR 97, /78. Right hip xray revealed an intertrochanteric fracture of the right hip, with varus angulation of the   distal fragment, subtrochanteric extension with reverse obliquity and avulsion of the lesser trochanter. On admission the patient was resting comfortably; denied any chest pain, shortness of breath, dizziness, confusion, subjective fever, chills, nausea, vomiting, diarrhea. He lives w/ his wife at home; ambulates w/ a cane at baseline. The patient is a 64 year-old male with a PMH of CAD s/p CABG, HFrEF (25%), DM (on insulin pump), pulmonary hypertension, Celiac disease, diverticulitis, s/p right total knee replacement October 2019 complicated by infection s/p abx course w/ PICC, presenting following a mechanical fall. He reports that over the last several days he has been feeling more tired; last night he felt especially tired and sat down in the chair. After then getting up to go to the kitchen, he "fell asleep standing," landing on his right buttock. He reports he had been asleep for about 20 seconds. He denies any associated loss of bladder or bowel continence, dizziness or lightheadedness, nausea or vomiting prior to or after the episode. His wife then called EMS and he presented to the ED. On presentation, vitals were T 97.8 F, HR 97, /78; trops 0.03. Right hip xray revealed an intertrochanteric fracture of the right hip, with varus angulation of the distal fragment, subtrochanteric extension with reverse obliquity and avulsion of the lesser trochanter. On admission the patient was resting comfortably; denied any chest pain, shortness of breath, dizziness, confusion, subjective fever, chills, nausea, vomiting, diarrhea. He lives w/ his wife at home; ambulates w/ a cane at baseline. The patient is a 64 year-old male with a PMH of CAD s/p CABG, HFrEF (11%), DM (on insulin pump), pulmonary hypertension, Celiac disease, diverticulitis, s/p right total knee replacement October 2019 complicated by infection s/p abx course w/ PICC, presenting following a mechanical fall. He reports that over the last several days he has been feeling more tired; last night he felt especially tired and sat down in the chair. After then getting up to go to the kitchen, he "fell asleep standing," landing on his right buttock. He reports he had been asleep for about 20 seconds. He denies any associated loss of bladder or bowel continence, dizziness or lightheadedness, nausea or vomiting prior to or after the episode. His wife then called EMS and he presented to the ED. On presentation, vitals were T 97.8 F, HR 97, /78; trops 0.03. Right hip xray revealed an intertrochanteric fracture of the right hip, with varus angulation of the distal fragment, subtrochanteric extension with reverse obliquity and avulsion of the lesser trochanter. On admission the patient was resting comfortably; denied any chest pain, shortness of breath, dizziness, confusion, subjective fever, chills, nausea, vomiting, diarrhea. He lives w/ his wife at home; ambulates w/ a cane at baseline.

## 2020-01-31 NOTE — CONSULT NOTE ADULT - SUBJECTIVE AND OBJECTIVE BOX
Orthopaedics Consult Note    FLOWER MARISCAL  [id number]    64M w/ hx of CAD s/p CABG. DM, CHF, peripheral neuropathy, right TKA at outside hospital s/p I&D presenting w/ right hip pain after fall earlier today.  Patient states he fell asleep while standing up and subsequently fell down.  Denies head trauma or pain elsewhere.  Orthopedics was consulted after imaging demonstrated right intertrochanteric femur fracture.  Denies radiating pain.    PMH/PSH  Diabetic neuropathy  STEMI (ST elevation myocardial infarction)  Diverticulitis  MRSA bacteremia  History of celiac disease  CHF (congestive heart failure)  HTN (hypertension)  Diabetes  Blood clot due to device, implant, or graft  CKD (chronic kidney disease) stage 2, GFR 60-89 ml/min  COPD, moderate  Hyperkalemia  HLD (hyperlipidemia)  Chronic systolic CHF (congestive heart failure)  Osteoarthritis  HTN (hypertension)  Type 2 diabetes mellitus with neurological manifestations  Type 2 diabetes mellitus  History of surgery to heart and great vessels, presenting hazards to health  Atherosclerosis of coronary artery  Status post percutaneous transluminal coronary angioplasty  CHF (congestive heart failure)  HTN (hypertension)  Intertrochanteric fracture of right hip  Surgery, elective  Surgery, elective  S/P TKR (total knee replacement), right  S/P CABG x 1  Stented coronary artery  History of surgery to major organs, presenting hazards to health  Other postprocedural status  FALL  18      Medications  enoxaparin Injectable 40 milliGRAM(s) SubCutaneous daily      Allergies  ACE inhibitors (Hives)  enalapril (Hives)  rifampin (Angioedema)  vancomycin (Angioedema)        T(C): 36.7 (01-31-20 @ 05:14), Max: 36.7 (01-31-20 @ 05:14)  HR: 80 (01-31-20 @ 05:14) (80 - 97)  BP: 132/80 (01-31-20 @ 05:14) (132/80 - 135/78)  RR: 18 (01-31-20 @ 05:14) (18 - 18)  SpO2: 98% (01-31-20 @ 05:14) (98% - 100%)    Physical Exam  NAD  Breathing comfortably on RA  Resting comfortably    B/L UE:  skin intact, full AROM throughout extremity, sensation slightly diminished, NVID, fingers wwp, palpable radial pulse  LLE:  skin intact, full AROM throughout extremity, sensation slightly diminished, NVID, toes wwp, palpable DP pulse    RLE  skin intact  Shortened, externally rotated   No laceration/abrasion noted  (+) pain w/ log roll/axial load  pelvis stable to compression  Motor: TA/EHL/Gastroc intact  sensation slightly diminished distally throughout  Vasc: foot WWP, 2+ DP pulse    Labs                        11.2   8.31  )-----------( 265      ( 31 Jan 2020 02:24 )             37.7     01-31    136  |  100  |  32<H>  ----------------------------<  130<H>  5.1<H>   |  23  |  1.0    Ca    8.9      31 Jan 2020 02:24    TPro  7.0  /  Alb  3.7  /  TBili  0.9  /  DBili  x   /  AST  35  /  ALT  58<H>  /  AlkPhos  91  01-31    LIVER FUNCTIONS - ( 31 Jan 2020 02:24 )  Alb: 3.7 g/dL / Pro: 7.0 g/dL / ALK PHOS: 91 U/L / ALT: 58 U/L / AST: 35 U/L / GGT: x           PT/INR - ( 31 Jan 2020 02:24 )   PT: 16.50 sec;   INR: 1.43 ratio         PTT - ( 31 Jan 2020 02:24 )  PTT:35.5 sec    Img  XR: R intertrochanteric femur fx    A/P: Patient is a 64y year old Male with extensive pmh as above who sustained Right Intertrochanteric femur fracture    - nonweightbearing on RLE; bedrest  -r/b/a of surgical and non-surgical treatment discussed with patient; he understands that he needs surgery and is amenable to this plan for OR today as add-on for right hip open reduction internal fixation  - keep NPO, IV fluids  - DVT ppx: SCDs; hold chemical ppx for this morning  Pain control PRN  Resume Home meds  - Obtain pre-op workup as follows (if not obtained already):  	- CBC, Chem, Coags, T&S x 2  	- EKG/CXR              - 2u pRBC on hold for OR  - consent obtained  - admit to medicine; will need medical clearance prior to surgery w/ documentation signed by attending  - Ortho to follow while in house  - remainder of care per primary team

## 2020-01-31 NOTE — ED ADULT NURSE NOTE - NSIMPLEMENTINTERV_GEN_ALL_ED
Implemented All Fall with Harm Risk Interventions:  [place] to call system. Call bell, personal items and telephone within reach. Instruct patient to call for assistance. Room bathroom lighting operational. Non-slip footwear when patient is off stretcher. Physically safe environment: no spills, clutter or unnecessary equipment. Stretcher in lowest position, wheels locked, appropriate side rails in place. Provide visual cue, wrist band, yellow gown, etc. Monitor gait and stability. Monitor for mental status changes and reorient to person, place, and time. Review medications for side effects contributing to fall risk. Reinforce activity limits and safety measures with patient and family. Provide visual clues: red socks.

## 2020-01-31 NOTE — H&P ADULT - ASSESSMENT
The patient is a 64 year-old male with a PMH of CAD s/p CABG, HFrEF (25%), DM (on insulin pump), pulmonary hypertension, Celiac disease, diverticulitis, s/p right total knee replacement October 2019 complicated by infection s/p abx course w/ PICC, presenting following a mechanical fall, admitted for right hip intertrochanteric fracture.    # Mechanical fall s/p right hip intertrochanteric fracture  Xray hip showed an intertrochanteric fracture of right hip w/ varus angulation of distal fragment; subtrochanteric extension w/ reverse obliquity and avulsion of lesser trochanter  Strict bedrest for now  Percocet q6h, PRN for severe pain  NPO except meds for now  Patient is possible add-on for today for open reduction and internal fixation of right hip  Cardiology c/s for risk stratification d/t HFrEF (11%), CAD s/p CABG  Medical clearance  Admit to Medicine      # CAD s/p CABG  C/w aspirin 81 mg PO qD, metoprolol succinate  mg PO qD  Holding prasugrel 10 mg PO qD for right hip fracture repair (patient reports he last took the prasugrel 3 days ago as he did not feel he required it)    # HFrEF (11%)  Patient reports he was seen by Dr. Garcia who recommended a biventricular pacemaker; no intervention done as of yet  C/w Lasix 40 mg PO qD, metolazone 2.5 mg PO, metoprolol succinate  mg PO qD  C/w digoxin 125 mcg PO qD  Vitals per routine    # S/p right knee total replacement complicated by infection s/p abx w/ PICC  Patient reports he had been on PO doxycycline to which he developed a reaction (edema in hands)  Switched to Augmentin 875/125 PO twice daily but he does not know the recommended abx course  C/w Augmentin 875/125 mg PO twice daily for now    # DM w/ peripheral neuropathy  On insulin pump; on Trulicity/Jardiance at home  NPO except meds for now  FS qAC, qHS; if > 180, adjust insulin pump as needed  C/w duloxetine 90 mg PO qD    # HLD  On Zetia at home  Start atorvastatin 40 mg PO qHS for now    # CHG  # DVT ppx- Lovenox 40 mg SQ qHS  # GI ppx- Protonix 40 mg PO qAM  # Diet- NPO except meds  # Activity- strict bedrest  # Dispo- acute; pending right hip fracture open reduction and internal fixation by Ortho  # Full code The patient is a 64 year-old male with a PMH of CAD s/p CABG, HFrEF (25%), DM (on insulin pump), pulmonary hypertension, Celiac disease, diverticulitis, s/p right total knee replacement October 2019 complicated by infection s/p abx course w/ PICC, presenting following a mechanical fall, admitted for right hip intertrochanteric fracture.    # Mechanical fall s/p right hip intertrochanteric fracture  Xray hip showed an intertrochanteric fracture of right hip w/ varus angulation of distal fragment; subtrochanteric extension w/ reverse obliquity and avulsion of lesser trochanter  Strict bedrest for now  Percocet q6h, PRN for severe pain  NPO except meds for now  Patient is possible add-on for today for open reduction and internal fixation of right hip  Cardiology c/s for risk stratification d/t HFrEF (11%), CAD s/p CABG  Medical clearance  Admit to Medicine    # CAD s/p CABG  C/w aspirin 81 mg PO qD, metoprolol succinate  mg PO qD  Holding prasugrel 10 mg PO qD for right hip fracture repair (patient reports he last took the prasugrel 3 days ago as he did not feel he required it)    # Questionable troponinemia on presentation  Trops 0.03 but hemolyzed w/ previous baseline 0.03-0.04  F/u 11 AM troponins    # HFrEF (EF 11% in December 2019)  Patient reports he was seen by Dr. Garcia who recommended a biventricular pacemaker; no intervention done as of yet  C/w Lasix 40 mg PO qD, metolazone 2.5 mg PO, metoprolol succinate  mg PO qD  C/w digoxin 125 mcg PO qD  Vitals per routine    # S/p right knee total replacement complicated by infection s/p abx w/ PICC  Patient reports he had been on PO doxycycline to which he developed a reaction (edema in hands)  Switched to Augmentin 875/125 PO twice daily but he does not know the recommended abx course  C/w Augmentin 875/125 mg PO twice daily for now    # DM w/ peripheral neuropathy  On insulin pump; on Trulicity/Jardiance at home  NPO except meds for now  FS qAC, qHS; if > 180, adjust insulin pump as needed  C/w duloxetine 90 mg PO qD    # HLD  On Zetia at home  Start atorvastatin 40 mg PO qHS for now    # CHG  # DVT ppx- Lovenox 40 mg SQ qHS  # GI ppx- Protonix 40 mg PO qAM  # Diet- NPO except meds  # Activity- strict bedrest  # Dispo- acute; pending right hip fracture open reduction and internal fixation by Ortho  # Full code

## 2020-01-31 NOTE — H&P ADULT - NSICDXPASTMEDICALHX_GEN_ALL_CORE_FT
PAST MEDICAL HISTORY:  Atherosclerosis of coronary artery CAD (coronary artery disease)    Blood clot due to device, implant, or graft was on blood thinners    CHF (congestive heart failure) EF ~ 25%    Diabetes on insulin pump    Diabetic neuropathy     Diverticulitis     History of celiac disease     HLD (hyperlipidemia)     HTN (hypertension)     MRSA bacteremia     Osteoarthritis     Status post percutaneous transluminal coronary angioplasty in 2012    STEMI (ST elevation myocardial infarction)

## 2020-01-31 NOTE — ED ADULT TRIAGE NOTE - CHIEF COMPLAINT QUOTE
BIBA S/P fall from standing, pt fell asleep and fell on his butt, no head trauma, on ASA. c/o right leg pain.

## 2020-01-31 NOTE — ED PROVIDER NOTE - OBJECTIVE STATEMENT
63 y/o M, PMHx CAD s/p CABG and PCI on DPT, DM (insulin pump) CHF 25%,  s/p Right TKR 10/19 p/w mechanical fall, pt states has been having insomnia for past 3 days, was walking in kitchen when he dozed off and fell onto buttocks, came to immediately. No LOC, no neck or back pain, c/o isolated right hip pain. no chest pain or shortness of breath.

## 2020-01-31 NOTE — PROGRESS NOTE ADULT - SUBJECTIVE AND OBJECTIVE BOX
a lengthy discussion with the pt today regarding the right knee   Dr Lujan performed the original procedure 10/2019 [facility]   mrsa infection with wash out and poly exchange 11/2019 [facility]   subsequent wound issues. Dr Lujan now works for [employer] out of network for the pt   Dr Lujan is happy to care for the pt but out of network benfits are expensive , as per our conversation today   The pt wishes to stay in the [facility]   The most recent orthopedist to see the pt is Dr Jose Castro a few weeks ago   the most recent procedure at [facility] a few weeks ago  was by dr brannon  vascular / wound surgeon  performed a debridement an application of wound vac, the vac recently failed wtd are being performed   The pt wishes to continue with his knee care at [facility] , however it will be difficult to f/u at this time with his new hip fracture and is willing  to have the burn team take a look and see if they can offer him any treatment in the interim.

## 2020-01-31 NOTE — H&P ADULT - NSHPLABSRESULTS_GEN_ALL_CORE
11.2   8.31  )-----------( 265      ( 31 Jan 2020 02:24 )             37.7     01-31    136  |  100  |  32<H>  ----------------------------<  130<H>  5.1<H>   |  23  |  1.0    Ca    8.9      31 Jan 2020 02:24    TPro  7.0  /  Alb  3.7  /  TBili  0.9  /  DBili  x   /  AST  35  /  ALT  58<H>  /  AlkPhos  91  01-31      Troponin T, Serum (01.31.20 @ 02:24)    Troponin T, Serum: 0.03: Hemolyzed. Interpret with caution  Critical value: ng/mL      < from: Xray Hip 2-3 Views, Right (01.31.20 @ 04:00) >      EXAM:  XR HIP 2-3V RT            PROCEDURE DATE:  01/31/2020        INTERPRETATION:  Clinical history/Reason for exam: Trauma.    Comparison: Same exam from 90 minutes prior.    Procedure: Single frontal view of the pelvis and focused view of the right hip    Findings:  Intertrochanteric fracture of the right hip, with varus angulation of the   distal fragment.     Subtrochanteric extension with reverse obliquity and avulsion of the lesser   trochanter.     Femoral head remains in anatomic alignment.     Impression:  Unchanged appearance of comminuted right hip fracture as above

## 2020-01-31 NOTE — ED PROVIDER NOTE - NS ED ROS FT
Constitutional: See HPI.  Eyes: No visual changes, eye pain or discharge. No Photophobia  ENMT: No hearing changes, pain, discharge or infections. No neck pain or stiffness. No limited ROM  Cardiac: No SOB or edema. No chest pain with exertion.  Respiratory: No cough or respiratory distress. No hemoptysis.   GI: No nausea, vomiting, diarrhea or abdominal pain.  : No dysuria, frequency or burning. No Discharge  MS: see hpi   Neuro: No headache or weakness. No LOC.  Skin: No skin rash.  Except as documented in the HPI, all other systems are negative.

## 2020-01-31 NOTE — H&P ADULT - NSICDXFAMILYHX_GEN_ALL_CORE_FT
FAMILY HISTORY:  Family history of allergies, daughter    Mother  Still living? No  Family history of heart disease, Age at diagnosis: Age Unknown

## 2020-01-31 NOTE — ED PROVIDER NOTE - PHYSICAL EXAMINATION
VITAL SIGNS: I have reviewed nursing notes and confirm.  CONSTITUTIONAL: well-appearing, non-toxic, NAD  SKIN: Warm dry, normal skin turgor  HEAD: NCAT  EYES: EOMI, PERRLA, no scleral icterus  ENT: Moist mucous membranes, normal pharynx with no erythema or exudates  NECK: Supple; non tender. Full ROM. No cervical LAD  CARD: RRR, no murmurs, rubs or gallops  RESP: clear to ausculation b/l.  No rales, rhonchi, or wheezing.  ABD: soft, + BS, non-tender, non-distended, no rebound or guarding.   EXT: right hip ttp, RLE externally rotated and shortened   NEURO: nonfocal   PSYCH: Cooperative, appropriate.

## 2020-01-31 NOTE — CONSULT NOTE ADULT - ASSESSMENT
65 YO M with a PMH of CAD s/p CABG (LIMA to LAD), s/p PCI of RCA with instent thrombosis plavix resistance on Effient, ischemic CM/HFrEF (25%), DM (on insulin pump), pulmonary hypertension, Celiac disease, diverticulitis, s/p right total knee replacement October 2019 complicated by infection s/p abx course w/ PICC, presenting following a mechanical fall found to have right hip IT fracture, being seen by Cardiology for preop risk stratification.    A & P:    CAD s/p STEMI, s/p CABG (LIMA to LAD) s/p PCI of RCA with instent thrombosis in Oct 2018, plavix resistance on Effient (pt. stopped effient by himself 3 days prior to admission)    Ischemic CM/HFrEF (25%) on MUGA scan (no AICD yet), compensated/euvolemic, not on ACEi/ARB/ARNI due to intolerance seeing an allergist as outpatient    Right femoral IT fracture (METS 2-4 due to fatigue, poor functional status, and recent right knee surgery)    Severe Pulmonary HTN (PASP 78, RAP of 15)    LBBB (old)     Recommend:  -No acute cardiac decompensation, malignant arrhythmias, or ACS  -RCRI of 3 (CAD/MI, HFrEF, DM on insulin), pt at intermediate to high risk for intermediate risk surgery  -continue with GDMT for CAD, HFrEF (ASA, statin, betablocker, digoxin)  May hold Effient for the procedure  -montior urine output and strict I's and O's, avoid volume overload  -F/U by EPS and [facility] as outpatient 63 YO M with a PMH of CAD s/p CABG (LIMA to LAD), s/p PCI of RCA with instent thrombosis plavix resistance on Effient, ischemic CM/HFrEF (25%), DM (on insulin pump), pulmonary hypertension, Celiac disease, diverticulitis, s/p right total knee replacement October 2019 complicated by infection s/p abx course w/ PICC, presenting following a mechanical fall found to have right hip IT fracture, being seen by Cardiology for preop risk stratification.    A & P:    CAD s/p STEMI, s/p CABG (LIMA to LAD) s/p PCI of RCA with instent thrombosis in Oct 2018, plavix resistance on Effient (pt. stopped effient by himself 3 days prior to admission)    Ischemic CM/HFrEF (25%) on MUGA scan (no AICD yet), compensated/euvolemic, not on ACEi/ARB/ARNI due to intolerance seeing an allergist as outpatient    Right femoral IT fracture (METS 2-4 due to fatigue, poor functional status, and recent right knee surgery)    Severe Pulmonary HTN (PASP 78, RAP of 15)    LBBB (old)    The patient is a moderate risk patient going to a moderate risk procedure      Recommend:  -No acute cardiac decompensation, malignant arrhythmias, or ACS  -Apply Defibrillator pads to patient chest perioperatively   -Keep equal balance   -Will F/U   -RCRI of 3 (CAD/MI, HFrEF, DM on insulin), pt at intermediate to high risk for intermediate risk surgery  -continue with GDMT for CAD, HFrEF (ASA, statin, betablocker, digoxin)  May hold Effient for the procedure  -montior urine output and strict I's and O's, avoid volume overload  -F/U by EPS and Card[facility] as outpatient

## 2020-01-31 NOTE — CHART NOTE - NSCHARTNOTEFT_GEN_A_CORE
PACU ANESTHESIA ADMISSION NOTE      Procedure:   Post op diagnosis:      ____  Intubated  TV:______       Rate: ______      FiO2: ______    __x__  Patent Airway    ___x_  Full return of protective reflexes    __x__  Full recovery from anesthesia / back to baseline     Vitals:   T:  97.8         R:   12               BP:  119/63                Sat:  99                 P: 90      Mental Status:  __x__ Awake   ____x_ Alert   _____ Drowsy   _____ Sedated    Nausea/Vomiting:  __x_ NO  ______Yes,   See Post - Op Orders          Pain Scale (0-10):  _____    Treatment: ___x_ None    ____ See Post - Op/PCA Orders    Post - Operative Fluids:   ____ Oral   _x___ See Post - Op Orders    Plan: Discharge:   ____Home       __x___Floor     _____Critical Care    _____  Other:_________________    Comments:    Pt bought to RR spontaneously breathing, hemodynamically stable

## 2020-01-31 NOTE — H&P ADULT - ATTENDING COMMENTS
Patient seen and examined independently. Agree with resident note/ history / physical exam and plan of care with following exceptions/additions/updates. Case discussed with house-staff, nursing and patient/pt decision maker.    [facility] and orthopedics.   awaiting cardio input. if ok , ortho is going to do surgery today.     dw pt, he understands and agrees with plan.

## 2020-01-31 NOTE — CONSULT NOTE ADULT - SUBJECTIVE AND OBJECTIVE BOX
Date of Admission: 1/31    CHIEF COMPLAINT: mechanical fall    HISTORY OF PRESENT ILLNESS:   65 YO M with a PMH of CAD s/p CABG (LIMA to LAD), s/p PCI of RCA with in-stent thrombosis plavix resistance on Effient, ischemic CM/HFrEF (25%), DM (on insulin pump), pulmonary hypertension, Celiac disease, diverticulitis, s/p right total knee replacement October 2019 complicated by infection s/p abx course w/ PICC, presenting following a mechanical fall. He reports that over the last several days he has been feeling more tired; last night he felt especially tired and sat down in the chair. After then getting up to go to the kitchen, he "fell asleep standing," landing on his right buttock. He reports he had been asleep for about 20 seconds. He denies any associated loss of bladder or bowel continence, dizziness or lightheadedness, nausea or vomiting prior to or after the episode. His wife then called EMS and he presented to the ED. On presentation, vitals were T 97.8 F, HR 97, /78; trops 0.03. Right hip xray revealed an intertrochanteric fracture of the right hip, with varus angulation of the distal fragment, subtrochanteric extension with reverse obliquity and avulsion of the lesser trochanter. On admission the patient was resting comfortably; denied any chest pain, shortness of breath, dizziness, confusion, subjective fever, chills, nausea, vomiting, diarrhea. He lives w/ his wife at home; ambulates w/ a cane at baseline.    Cardiology:   Pt. reports that he had a mechanical fall, and is found to have right femoral IT fracture. Pt. denies any chest pain, sob, palpitations, orthopnea, pnd, syncope, loc.    PAST MEDICAL & SURGICAL HISTORY:  Diabetic neuropathy  STEMI (ST elevation myocardial infarction)  Diverticulitis  MRSA bacteremia  History of celiac disease  CHF (congestive heart failure): EF ~ 25%  HTN (hypertension)  Diabetes: on insulin pump  Blood clot due to device, implant, or graft: was on blood thinners  HLD (hyperlipidemia)  Osteoarthritis  Atherosclerosis of coronary artery: CAD (coronary artery disease)  Status post percutaneous transluminal coronary angioplasty: in 2012  Surgery, elective: Right shoulder  Surgery, elective: right knee wound debridement  S/P TKR (total knee replacement), right: with infection Mrsa   per pt he was cleared from MRSA infection  S/P CABG x 1: 2018  Stented coronary artery: 10/18 heart attack  INFERIOR WALL MI  Other postprocedural status: Fixation hardware in foot LEFT      FAMILY HISTORY:  [x] no pertinent family history of premature cardiovascular disease in first degree relatives.  Mother:   Father:   Siblings:     SOCIAL HISTORY:    [ ] Non-smoker  [x] Former smoker  [ ] Alcohol    Allergies    ACE inhibitors (Hives)  enalapril (Hives)  rifampin (Angioedema)  vancomycin (Angioedema)    Intolerances    	    REVIEW OF SYSTEMS:  CONSTITUTIONAL: No fever, weight loss, or fatigue  CARDIOLOGY: denies chest pain, shortness of breath or syncopal episodes.   RESPIRATORY: denies shortness of breath, wheezing.   NEUROLOGICAL: No weakness, no focal deficits to report.  ENDOCRINOLOGICAL: no recent change in diabetic medications.   GI: no BRBPR, no N,V, diarrhea.    PSYCHIATRY: normal mood and affect  HEENT: no nasal discharge, no ecchymosis  SKIN: no ecchymosis, no breakdown  MUSCULOSKELETAL: Full range of motion x4.     PHYSICAL EXAM:  T(C): 36.6 (01-31-20 @ 08:01), Max: 36.7 (01-31-20 @ 05:14)  HR: 91 (01-31-20 @ 08:01) (80 - 97)  BP: 125/78 (01-31-20 @ 08:01) (125/78 - 135/78)  RR: 18 (01-31-20 @ 08:01) (18 - 18)  SpO2: 97% (01-31-20 @ 08:01) (97% - 100%)  Wt(kg): --  I&O's Summary      General Appearance: well appearing, normal for age and gender. 	  Neck: normal JVP, no bruit.   Eyes: No xanthelasma, Extra ocular muscles intact.   Cardiovascular: regular rate and rhythm S1 S2, No JVD, No murmurs, No edema  Respiratory: Lungs clear to auscultation	  Psychiatry: Alert and oriented x 3, Mood & affect appropriate  Gastrointestinal:  Soft, Non-tender  Skin/Integument: No rashes, No ecchymosis, No cyanosis	  Neurologic: Non-focal  Musculoskeletal/ extremities: Normal range of motion, No clubbing, cyanosis or edema  Vascular: Peripheral pulses palpable 2+ bilaterally    LABS:	 	                          11.2   8.31  )-----------( 265      ( 31 Jan 2020 02:24 )             37.7     01-31    136  |  100  |  32<H>  ----------------------------<  130<H>  5.1<H>   |  23  |  1.0    Ca    8.9      31 Jan 2020 02:24    TPro  7.0  /  Alb  3.7  /  TBili  0.9  /  DBili  x   /  AST  35  /  ALT  58<H>  /  AlkPhos  91  01-31    CARDIAC MARKERS ( 31 Jan 2020 02:24 )  x     / 0.03 ng/mL / x     / x     / x          PT/INR - ( 31 Jan 2020 02:24 )   PT: 16.50 sec;   INR: 1.43 ratio         PTT - ( 31 Jan 2020 02:24 )  PTT:35.5 sec      ECG:  	  sinus rhythm @94 bpm, PAC's, LAD, LBBB    RADIOLOGY:  CXR:   No evidence of acute cardiopulmonary disease    PREVIOUS DIAGNOSTIC TESTING:    [x] Echocardiogram:  Transthoracic Echocardiogram (12.13.19 @ 10:55)    1. Severely decreased global left ventricular systolic function.   2. Multiple left ventricular regional wall motion abnormalities exist.   See wall motion findings.   3. Elevated left atrial and left ventricular end-diastolic pressures.   4. Spectral Doppler shows restrictive pattern of left ventricular   myocardial filling (Grade III diastolic dysfunction).   5. The LVEF by triplane Wilkerson's technique is 11%. The mean global   longitudinal peak strain by speckle tracking is -5.6% which is markedly   reduced.   6. Mild mitral valve regurgitation.   7. Mild-moderate tricuspid regurgitation.   8. Estimated pulmonary artery systolic pressure is 78.6 mmHg assuming a   right atrial pressure of 15 mmHg, which is consistent with severe   pulmonary hypertension.   9. Pulmonary hypertension is present.  10. LA volume Index is 56.9 ml/m² ml/m2.      NM MUGA Scan (12.16.19 @ 15:52)   1. Left ventricular ejection fraction of  26%    which is  low. Wall   motion analysis suggests ejection fraction of 20-25%  2. Dilated left ventricle with global hypokinesis  3. Dilated right ventricle with global hypokinesis     [x]  Catheterization: 12/1/2018  1 vessel CAD, ostial/prox LAD 95% lesion, ISR, distal LAD supplied by patent LIMA to LAD      [ ] Stress Test:  	        Home Medications:  aspirin 81 mg oral delayed release tablet: 1 tab(s) orally once a day (31 Jan 2020 06:21)  atorvastatin 40 mg oral tablet: 1 tab(s) orally once a day (31 Jan 2020 06:21)  Augmentin 875 mg-125 mg oral tablet: 1 tab(s) orally every 12 hours (31 Jan 2020 06:21)  digoxin 125 mcg (0.125 mg) oral tablet: 1 tab(s) orally once a day (31 Jan 2020 06:21)  DULoxetine 30 mg oral delayed release capsule: 3 cap(s) orally once a day (31 Jan 2020 08:32)  furosemide 40 mg oral tablet: 1 tab(s) orally once a day (31 Jan 2020 06:21)  Jardiance:  (31 Jan 2020 08:33)  metOLazone 2.5 mg oral tablet: 1 tab(s) orally once a day, 30 min before lasix (31 Jan 2020 06:21)  Metoprolol Succinate ER 50 mg oral tablet, extended release: 3 tab(s) orally once a day (31 Jan 2020 06:21)  pantoprazole 40 mg oral delayed release tablet: 1 tab(s) orally once a day (31 Jan 2020 06:21)  Percocet 5/325 oral tablet: 1 tab(s) orally every 6 hours (31 Jan 2020 06:21)  prasugrel 10 mg oral tablet: 1 tab(s) orally once a day (31 Jan 2020 06:21)  Trulicity Pen: subcutaneous once a week (31 Jan 2020 08:33)  Zetia 10 mg oral tablet: 1 tab(s) orally once a day (at bedtime) (31 Jan 2020 06:21)    MEDICATIONS  (STANDING):  amoxicillin  875 milliGRAM(s)/clavulanate 1 Tablet(s) Oral every 12 hours  aspirin enteric coated 81 milliGRAM(s) Oral daily  atorvastatin Oral Tab/Cap - Peds 40 milliGRAM(s) Oral daily  chlorhexidine 4% Liquid 1 Application(s) Topical <User Schedule>  digoxin     Tablet 0.125 milliGRAM(s) Oral daily  DULoxetine 90 milliGRAM(s) Oral daily  enoxaparin Injectable 40 milliGRAM(s) SubCutaneous daily  furosemide    Tablet 40 milliGRAM(s) Oral daily  metolazone 2.5 milliGRAM(s) Oral daily  metoprolol succinate  milliGRAM(s) Oral daily  pantoprazole    Tablet 40 milliGRAM(s) Oral before breakfast    MEDICATIONS  (PRN):  oxycodone    5 mG/acetaminophen 325 mG 1 Tablet(s) Oral every 6 hours PRN Severe Pain (7 - 10)

## 2020-01-31 NOTE — ED ADULT NURSE NOTE - OBJECTIVE STATEMENT
The patient is a 64y Male complaining of fall. pt reports mechanical fall at home on his right hip, reports recent knee surgery, no chest pain, no nausea, no vomting, afebrile.

## 2020-01-31 NOTE — ED PROVIDER NOTE - CHIEF COMPLAINT
"Subjective:      Jie Lopez is a 20 y.o. female who presents with Nausea    Chief Complaint   Patient presents with   • Nausea     nausea, vomiting x 4 days        - ~4-6 episodes on non bloody vomiting diarrhea/day x 2 days w/ occasional abd cramps. No fever no recent travel, abx use.       Past medical, social, family and surgical history otherwise negative or non contributory             HPI    Review of Systems   Constitutional: Positive for malaise/fatigue. Negative for fever and chills.   Cardiovascular: Positive for chest pain.   Gastrointestinal: Positive for nausea, vomiting, abdominal pain and diarrhea. Negative for blood in stool and melena.   Neurological: Positive for headaches. Negative for dizziness and focal weakness.          Objective:     /66 mmHg  Pulse 90  Temp(Src) 36.3 °C (97.4 °F)  Ht 1.575 m (5' 2\")  Wt 72.576 kg (160 lb)  BMI 29.26 kg/m2  SpO2 100%     Physical Exam   Constitutional: She appears well-developed. No distress.   HENT:   Head: Normocephalic and atraumatic.   Mouth/Throat: Oropharynx is clear and moist.   Eyes: Conjunctivae are normal.   Neck: Neck supple.   Cardiovascular: Regular rhythm.    No murmur heard.  Pulmonary/Chest: Effort normal and breath sounds normal.   Abdominal: Soft. Bowel sounds are normal. She exhibits no distension. There is no tenderness. There is no rebound and no guarding.   Neurological: She is alert. She exhibits normal muscle tone.   Skin: Skin is warm and dry.   Psychiatric: She has a normal mood and affect. Judgment normal.   Nursing note and vitals reviewed.              Assessment/Plan:         1. AGE (acute gastroenteritis)           Rest hydrate bland diet    Dx & d/c instructions discussed w/ patient and/or family members. Follow up w/ Prvt Dr or here in 3-4 days if not getting better, sooner if needed,  ER if worse and UC/PCP unavailable.        Possible side effects (i.e. Rash, GI upset/constipation, sedation, " The patient is a 64y Male complaining of fall. elevation of BP or sugars) of any medications given discussed.

## 2020-01-31 NOTE — ED PROVIDER NOTE - ATTENDING CONTRIBUTION TO CARE
65 yo sp mech fall, right hip fx. no head inj, neck pain, cp sob ab pain. nvi. right LE ext rot and short.

## 2020-01-31 NOTE — H&P ADULT - NSHPPHYSICALEXAM_GEN_ALL_CORE
Constitutional: AAOx3, NAD, overweight body habitus  HEENT: normocephalic, atraumatic  Cardiovascular: NS1, S2, RRR  Pulmonary: clear to auscultation bilaterally; non-tachypneic, non-cyanotic  Gastrointestinal: scant bowel sounds; soft, nontender, slightly distended  Neurological: restricted range of motion of right lower extremity  Extremities: dressing pad over right knee clean and dry; no surrounding erythema or edema

## 2020-02-01 LAB
BASOPHILS # BLD AUTO: 0.04 K/UL — SIGNIFICANT CHANGE UP (ref 0–0.2)
BASOPHILS NFR BLD AUTO: 0.4 % — SIGNIFICANT CHANGE UP (ref 0–1)
EOSINOPHIL # BLD AUTO: 0.22 K/UL — SIGNIFICANT CHANGE UP (ref 0–0.7)
EOSINOPHIL NFR BLD AUTO: 2.4 % — SIGNIFICANT CHANGE UP (ref 0–8)
GLUCOSE BLDC GLUCOMTR-MCNC: 114 MG/DL — HIGH (ref 70–99)
GLUCOSE BLDC GLUCOMTR-MCNC: 146 MG/DL — HIGH (ref 70–99)
HCT VFR BLD CALC: 30.1 % — LOW (ref 42–52)
HGB BLD-MCNC: 9 G/DL — LOW (ref 14–18)
IMM GRANULOCYTES NFR BLD AUTO: 0.3 % — SIGNIFICANT CHANGE UP (ref 0.1–0.3)
LYMPHOCYTES # BLD AUTO: 0.55 K/UL — LOW (ref 1.2–3.4)
LYMPHOCYTES # BLD AUTO: 5.9 % — LOW (ref 20.5–51.1)
MAGNESIUM SERPL-MCNC: 1.7 MG/DL — LOW (ref 1.8–2.4)
MCHC RBC-ENTMCNC: 22.1 PG — LOW (ref 27–31)
MCHC RBC-ENTMCNC: 29.9 G/DL — LOW (ref 32–37)
MCV RBC AUTO: 74 FL — LOW (ref 80–94)
MONOCYTES # BLD AUTO: 0.85 K/UL — HIGH (ref 0.1–0.6)
MONOCYTES NFR BLD AUTO: 9.2 % — SIGNIFICANT CHANGE UP (ref 1.7–9.3)
NEUTROPHILS # BLD AUTO: 7.56 K/UL — HIGH (ref 1.4–6.5)
NEUTROPHILS NFR BLD AUTO: 81.8 % — HIGH (ref 42.2–75.2)
NRBC # BLD: 0 /100 WBCS — SIGNIFICANT CHANGE UP (ref 0–0)
PLATELET # BLD AUTO: 235 K/UL — SIGNIFICANT CHANGE UP (ref 130–400)
RBC # BLD: 4.07 M/UL — LOW (ref 4.7–6.1)
RBC # FLD: 19.6 % — HIGH (ref 11.5–14.5)
WBC # BLD: 9.25 K/UL — SIGNIFICANT CHANGE UP (ref 4.8–10.8)
WBC # FLD AUTO: 9.25 K/UL — SIGNIFICANT CHANGE UP (ref 4.8–10.8)

## 2020-02-01 PROCEDURE — 99233 SBSQ HOSP IP/OBS HIGH 50: CPT

## 2020-02-01 RX ORDER — SODIUM CHLORIDE 9 MG/ML
500 INJECTION INTRAMUSCULAR; INTRAVENOUS; SUBCUTANEOUS
Refills: 0 | Status: DISCONTINUED | OUTPATIENT
Start: 2020-02-01 | End: 2020-02-14

## 2020-02-01 RX ORDER — MAGNESIUM SULFATE 500 MG/ML
1 VIAL (ML) INJECTION ONCE
Refills: 0 | Status: COMPLETED | OUTPATIENT
Start: 2020-02-01 | End: 2020-02-01

## 2020-02-01 RX ORDER — MORPHINE SULFATE 50 MG/1
2 CAPSULE, EXTENDED RELEASE ORAL EVERY 4 HOURS
Refills: 0 | Status: DISCONTINUED | OUTPATIENT
Start: 2020-02-01 | End: 2020-02-04

## 2020-02-01 RX ORDER — ALPRAZOLAM 0.25 MG
0.25 TABLET ORAL ONCE
Refills: 0 | Status: DISCONTINUED | OUTPATIENT
Start: 2020-02-01 | End: 2020-02-01

## 2020-02-01 RX ADMIN — Medication 650 MILLIGRAM(S): at 12:35

## 2020-02-01 RX ADMIN — Medication 650 MILLIGRAM(S): at 06:30

## 2020-02-01 RX ADMIN — DULOXETINE HYDROCHLORIDE 90 MILLIGRAM(S): 30 CAPSULE, DELAYED RELEASE ORAL at 12:30

## 2020-02-01 RX ADMIN — ATORVASTATIN CALCIUM 40 MILLIGRAM(S): 80 TABLET, FILM COATED ORAL at 00:20

## 2020-02-01 RX ADMIN — Medication 650 MILLIGRAM(S): at 23:49

## 2020-02-01 RX ADMIN — Medication 0.25 MILLIGRAM(S): at 21:57

## 2020-02-01 RX ADMIN — MORPHINE SULFATE 2 MILLIGRAM(S): 50 CAPSULE, EXTENDED RELEASE ORAL at 05:55

## 2020-02-01 RX ADMIN — Medication 100 MILLIGRAM(S): at 06:02

## 2020-02-01 RX ADMIN — Medication 650 MILLIGRAM(S): at 23:47

## 2020-02-01 RX ADMIN — PANTOPRAZOLE SODIUM 40 MILLIGRAM(S): 20 TABLET, DELAYED RELEASE ORAL at 06:03

## 2020-02-01 RX ADMIN — Medication 150 MILLIGRAM(S): at 06:03

## 2020-02-01 RX ADMIN — Medication 100 MILLIGRAM(S): at 15:30

## 2020-02-01 RX ADMIN — Medication 650 MILLIGRAM(S): at 18:30

## 2020-02-01 RX ADMIN — MORPHINE SULFATE 2 MILLIGRAM(S): 50 CAPSULE, EXTENDED RELEASE ORAL at 06:10

## 2020-02-01 RX ADMIN — Medication 0.12 MILLIGRAM(S): at 06:03

## 2020-02-01 RX ADMIN — Medication 40 MILLIGRAM(S): at 06:03

## 2020-02-01 RX ADMIN — ATORVASTATIN CALCIUM 40 MILLIGRAM(S): 80 TABLET, FILM COATED ORAL at 21:57

## 2020-02-01 RX ADMIN — Medication 650 MILLIGRAM(S): at 06:00

## 2020-02-01 RX ADMIN — Medication 650 MILLIGRAM(S): at 13:05

## 2020-02-01 RX ADMIN — Medication 100 GRAM(S): at 12:07

## 2020-02-01 RX ADMIN — Medication 650 MILLIGRAM(S): at 17:27

## 2020-02-01 RX ADMIN — SODIUM CHLORIDE 500 MILLILITER(S): 9 INJECTION INTRAMUSCULAR; INTRAVENOUS; SUBCUTANEOUS at 12:00

## 2020-02-01 RX ADMIN — ENOXAPARIN SODIUM 40 MILLIGRAM(S): 100 INJECTION SUBCUTANEOUS at 12:35

## 2020-02-01 RX ADMIN — Medication 81 MILLIGRAM(S): at 12:31

## 2020-02-01 NOTE — PROVIDER CONTACT NOTE (OTHER) - SITUATION
Pt c/o anxiety and is restless. VS taken. As per pt, he is in pain however couldn't get the medication due to his BP which was on 80's prior.

## 2020-02-01 NOTE — CONSULT NOTE ADULT - ASSESSMENT
IMPRESSION: Rehabilitation for Intertrochanteric fracture of right hip s/p  Open reduction and internal fixation of right hip using intramedullary hip screw 31-Jan-2020 17:27:34  Ranjith Hassan.    PRECAUTIONS: [  ] Cardiac  [  ] Respiratory  [  ] Seizures [  ] Contact Precautions  [  ] Droplet Isolation  [  ] Other:      Weight Bearing Status:  WBAT    RECOMMENDATION:    Out of bed to chair     DVT/decubitus ulcer prophylaxis    REHABILITATION PLAN:     [   ] Bedside PT 3-5 times a week   [   ]   Bedside OT  2-3 times a week             [   ] No Rehab Therapy Indicated                   [   ]  Speech Therapy   Conditioning/ROM                                    ADL  Bed Mobility                                               Conditioning/ROM  Transfers                                                     Bed Mobility  Sitting /Standing Balance                         Transfers                                        Gait Training                                               Sitting/Standing Balance  Stair Training  [   ] Applicable                    Home Equipment Evaluation                                                                        Splinting  [   ] Only      GOALS:   ADL   [   ]   Independent                    Transfers  [   ] Independent                          Ambulation  [   ] Independent     [    ] With device                            [   ]  CG                                                         [   ]  CG                                                                  [   ] CG                            [    ] Min A                                                   [   ] Min A                                                              [   ] Min  A          DISCHARGE PLAN:  [    ]  Good candidate for Intensive Rehabilitation/Hospital-based 4A [facility]                                             Will tolerate 3 hours of Intensive Rehab Daily                                       [    ]  Short Term Rehabilitation in Skilled Nursing Facility                                       [    ]  Home with Outpatient or  services                                         [    ]  Possible Candidate for Intensive Hospital-Based Rehabilitation      Thank you. IMPRESSION: Rehabilitation for Intertrochanteric fracture of right hip s/p  Open reduction and internal fixation of right hip using intramedullary hip screw 31-Jan-2020 17:27:34  Ranjith Hassan.  Noted to be s/p right TKR 10/2019 complicated by wound dehiscence awaiting skin graft    PRECAUTIONS: [  ] Cardiac   [  ] Respiratory  [  ] Seizures [  ] Contact Precautions  [  ] Droplet Isolation  [ X ] Other:  Per patient, no ROM permitted right knee s/p wound dehiscence pending skin graft t    Weight Bearing Status:  WBAT    RECOMMENDATION:    Out of bed to chair     DVT/decubitus ulcer prophylaxis    REHABILITATION PLAN:     [  X ] Bedside PT 3-5 times a week   [  X ]   Bedside OT  2-3 times a week             [   ] No Rehab Therapy Indicated                   [   ]  Speech Therapy   Conditioning/ROM                                    ADL  Bed Mobility                                               Conditioning/ROM  Transfers                                                     Bed Mobility  Sitting /Standing Balance                         Transfers                                        Gait Training                                               Sitting/Standing Balance  Stair Training  [ X  ] Applicable                    Home Equipment Evaluation                                                                        Splinting  [   ] Only      GOALS:   ADL   [   ]   Independent                    Transfers  [  X ] Independent                          Ambulation  [ X  ] Independent     [  X  ] With device                            [   ]  CG                                                         [   ]  CG                                                                  [   ] CG                            [    ] Min A                                                   [   ] Min A                                                              [   ] Min  A          DISCHARGE PLAN:  [    ]  Good candidate for Intensive Rehabilitation/Hospital-based 4A [facility]                                             Will tolerate 3 hours of Intensive Rehab Daily                                       [    ]  Short Term Rehabilitation in Skilled Nursing Facility                                       [    ]  Home with Outpatient or  services                                         [  X  ]  Possible Candidate for Intensive Hospital-Based Rehabilitation      Thank you.

## 2020-02-01 NOTE — PROVIDER CONTACT NOTE (OTHER) - RECOMMENDATIONS
Pt hob elevated and was asked to deep breathe o2 sat went up to 93% on RA. Pt was put on oxygen 2L for comfort

## 2020-02-01 NOTE — CONSULT NOTE ADULT - SUBJECTIVE AND OBJECTIVE BOX
HPI:  The patient is a 64 year-old  male with a PMH of CAD s/p CABG, HFrEF (11%), DM (on insulin pump), pulmonary hypertension, Celiac disease, diverticulitis, s/p right total knee replacement October 2019 complicated by infection s/p abx course w/ PICC, presenting following a mechanical fall. He reports that over the last several days he has been feeling more tired; last night he felt especially tired and sat down in the chair. After then getting up to go to the kitchen, he "fell asleep standing," landing on his right buttock. He reports he had been asleep for about 20 seconds. He denies any associated loss of bladder or bowel continence, dizziness or lightheadedness, nausea or vomiting prior to or after the episode. His wife then called EMS and he presented to the ED. On presentation, vitals were T 97.8 F, HR 97, /78; trops 0.03. Right hip xray revealed an intertrochanteric fracture of the right hip, with varus angulation of the distal fragment, subtrochanteric extension with reverse obliquity and avulsion of the lesser trochanter. On admission the patient was resting comfortably; denied any chest pain, shortness of breath, dizziness, confusion, subjective fever, chills, nausea, vomiting, diarrhea. He lives w/ his wife at home; ambulates w/ a cane at baseline. (31 Jan 2020 08:40)      PAST MEDICAL & SURGICAL HISTORY:  Diabetic neuropathy  STEMI (ST elevation myocardial infarction)  Diverticulitis  MRSA bacteremia  History of celiac disease  CHF (congestive heart failure): EF ~ 25%  HTN (hypertension)  Diabetes: on insulin pump  Blood clot due to device, implant, or graft: was on blood thinners  HLD (hyperlipidemia)  Osteoarthritis  Atherosclerosis of coronary artery: CAD (coronary artery disease)  Status post percutaneous transluminal coronary angioplasty: in 2012  Surgery, elective: Right shoulder  Surgery, elective: right knee wound debridement  S/P TKR (total knee replacement), right: with infection Mrsa   per pt he was cleared from MRSA infection  S/P CABG x 1: 2018  Stented coronary artery: 10/18 heart attack  INFERIOR WALL MI  Other postprocedural status: Fixation hardware in foot LEFT      HOSPITAL COURSE: S/p open reduction and internal fixation of right hip using intramedullary hip screw 31-Jan-2020 17:27:34  Ranjith Hassan.    TODAY'S SUBJECTIVE & REVIEW OF SYMPTOMS:    Constitutional WNL  Cardiac WNL   Respiratory WNL  GI WNL   WNL  Endocrine WNL  Skin WNL  Musculoskeletal WNL  Neuro WNL  Cognitive WNL  Psych WNL      MEDICATIONS  (STANDING):  acetaminophen   Tablet .. 650 milliGRAM(s) Oral every 6 hours  aspirin enteric coated 81 milliGRAM(s) Oral daily  atorvastatin Oral Tab/Cap - Peds 40 milliGRAM(s) Oral daily  ceFAZolin   IVPB 2000 milliGRAM(s) IV Intermittent every 8 hours  dextrose 5%. 1000 milliLiter(s) (50 mL/Hr) IV Continuous <Continuous>  dextrose 50% Injectable 12.5 Gram(s) IV Push once  dextrose 50% Injectable 25 Gram(s) IV Push once  dextrose 50% Injectable 25 Gram(s) IV Push once  digoxin     Tablet 0.125 milliGRAM(s) Oral daily  DULoxetine 90 milliGRAM(s) Oral daily  enoxaparin Injectable 40 milliGRAM(s) SubCutaneous daily  furosemide    Tablet 40 milliGRAM(s) Oral daily  lactated ringers. 1000 milliLiter(s) (50 mL/Hr) IV Continuous <Continuous>  metolazone 2.5 milliGRAM(s) Oral daily  metoprolol succinate  milliGRAM(s) Oral daily  pantoprazole    Tablet 40 milliGRAM(s) Oral before breakfast  sodium chloride 0.9%. 1000 milliLiter(s) (75 mL/Hr) IV Continuous <Continuous>    MEDICATIONS  (PRN):  dextrose 40% Gel 15 Gram(s) Oral once PRN Blood Glucose LESS THAN 70 milliGRAM(s)/deciliter  glucagon  Injectable 1 milliGRAM(s) IntraMuscular once PRN Glucose LESS THAN 70 milligrams/deciliter  HYDROmorphone  Injectable 0.5 milliGRAM(s) IV Push every 10 minutes PRN Moderate Pain (4 - 6)  morphine  - Injectable 2 milliGRAM(s) IV Push every 4 hours PRN Severe Pain (7 - 10)  oxyCODONE    IR 5 milliGRAM(s) Oral every 4 hours PRN Moderate Pain (4 - 6)      FAMILY HISTORY:  Family history of allergies: daughter  Family history of heart disease (Mother)      Allergies    ACE inhibitors (Hives)  enalapril (Hives)  rifampin (Angioedema)  vancomycin (Angioedema)    Intolerances        SOCIAL HISTORY:    [  ] Smoking  [  ] EtOH  [  ] Substance abuse     Home Environment:  [  ] Alone  [ X ] Lives with Family--wife  [  ] Home Health Aide    Dwelling:  [  ] Private House  [  ] Apartment  [  ] Adult Home/Assisted Living Facility  [  ] Skilled Nursing Facility      [  ] Short Term  [  ] Long Term  [  ] Stairs       Elevator [  ]    FUNCTIONAL STATUS PTA: (Check all that apply)  Ambulation: [  X ]Independent    [  ] Dependent     [  ] Non-Ambulatory  Assistive Device: [ X ] Straight Cane  [  ]  Quad Cane  [  ] Walker  [  ]  Wheelchair  ADL: [ X ] Independent  [  ]  Dependent       Vital Signs Last 24 Hrs  T(C): 36.8 (01 Feb 2020 08:00), Max: 37.1 (01 Feb 2020 00:00)  T(F): 98.3 (01 Feb 2020 08:00), Max: 98.8 (01 Feb 2020 00:00)  HR: 102 (01 Feb 2020 08:00) (91 - 105)  BP: 117/69 (01 Feb 2020 08:00) (105/65 - 129/59)  BP(mean): 80 (31 Jan 2020 22:00) (80 - 80)  RR: 19 (01 Feb 2020 08:00) (11 - 20)  SpO2: 93% (01 Feb 2020 08:00) (88% - 100%)      PHYSICAL EXAM: Alert and oriented X 4  GENERAL: Well-developed, well-nourished, in NAD  HEAD:  Normocephalic, atraumatic  EYES: EOMI, PERRLA, sclerae anicteric, conjunctivae not injected  NECK: Supple, No JVD, Normal thyroid  CHEST/LUNG: Clear to auscultation bilaterally; No rales, rhonchi, wheezing  HEART: Regular rate and rhythm; No murmurs, gallops, or rubs  ABDOMEN: Soft, nontender, nondistended; Bowel sounds present  EXTREMITIES:  2+ Peripheral pulses; No clubbing, cyanosis, or edema    NERVOUS SYSTEM:  Cranial Nerves II-XII intact [  ] Abnormal  [  ]  ROM: WFL all extremities [  ]  Abnormal [  ]  Motor Strength: WFL all extremities  [  ]  Abnormal [  ]  Sensation: intact to light touch [  ] Abnormal [  ]  Reflexes: Symmetric [  ]  Abnormal [  ]    FUNCTIONAL STATUS:  Bed Mobility: Independent [  ]  Supervision [  ]  Needs Assistance [  ]  N/A [  ]  Transfers: Independent [  ]  Supervision [  ]  Needs Assistance [  ]  N/A [  ]   Ambulation: Independent [  ]  Supervision [  ]  Needs Assistance [  ]  N/A [  ]  ADLs: Independent [  ] Requires Assistance [  ] N/A [  ]      LABS:                        9.0    9.25  )-----------( 235      ( 01 Feb 2020 04:30 )             30.1     01-31    136  |  100  |  32<H>  ----------------------------<  130<H>  5.1<H>   |  23  |  1.0    Ca    8.9      31 Jan 2020 02:24  Mg     1.7     02-01    TPro  7.0  /  Alb  3.7  /  TBili  0.9  /  DBili  x   /  AST  35  /  ALT  58<H>  /  AlkPhos  91  01-31    PT/INR - ( 31 Jan 2020 02:24 )   PT: 16.50 sec;   INR: 1.43 ratio         PTT - ( 31 Jan 2020 02:24 )  PTT:35.5 sec      RADIOLOGY & ADDITIONAL STUDIES: HPI:  The patient is a 64 year-old left-handed  male with a PMH of CAD s/p CABG, HFrEF (11%) [patient states this is an error and EF is 28-30%], DM (on insulin pump), pulmonary hypertension, Celiac disease, diverticulitis, s/p right total knee replacement October 2019 complicated by infection s/p abx course w/ PICC, presenting following a mechanical fall. He reports that over the last several days he has been feeling more tired; last night he felt especially tired and sat down in the chair. After then getting up to go to the kitchen, he "fell asleep standing," landing on his right buttock. He reports he had been asleep for about 20 seconds. He denies any associated loss of bladder or bowel continence, dizziness or lightheadedness, nausea or vomiting prior to or after the episode. His wife then called EMS and he presented to the ED. On presentation, vitals were T 97.8 F, HR 97, /78; trops 0.03. Right hip xray revealed an intertrochanteric fracture of the right hip, with varus angulation of the distal fragment, subtrochanteric extension with reverse obliquity and avulsion of the lesser trochanter. On admission the patient was resting comfortably; denied any chest pain, shortness of breath, dizziness, confusion, subjective fever, chills, nausea, vomiting, diarrhea. He lives w/ his wife at home; ambulates w/ a cane at baseline. (31 Jan 2020 08:40)      PAST MEDICAL & SURGICAL HISTORY:  Diabetic neuropathy  STEMI (ST elevation myocardial infarction)  Diverticulitis  MRSA bacteremia  History of celiac disease  CHF (congestive heart failure): EF ~ 25%  HTN (hypertension)  Diabetes: on insulin pump  Blood clot due to device, implant, or graft: was on blood thinners  HLD (hyperlipidemia)  Osteoarthritis  Atherosclerosis of coronary artery: CAD (coronary artery disease)  Status post percutaneous transluminal coronary angioplasty: in 2012  Surgery, elective: Right shoulder  Surgery, elective: right knee wound debridement  S/P TKR (total knee replacement), right: with infection Mrsa   per pt he was cleared from MRSA infection  S/P CABG x 1: 2018  Stented coronary artery: 10/18 heart attack  INFERIOR WALL MI  Other postprocedural status: Fixation hardware in foot LEFT      HOSPITAL COURSE: S/p open reduction and internal fixation of right hip using intramedullary hip screw 31-Jan-2020 17:27:34  Ranjith Hassan.    TODAY'S SUBJECTIVE & REVIEW OF SYMPTOMS:    Constitutional WNL  Cardiac WNL   Respiratory WNL  GI + episode of emesis   WNL  Endocrine WNL  Skin WNL  Musculoskeletal + right hip pain  Neuro WNL  Cognitive WNL  Psych WNL      MEDICATIONS  (STANDING):  acetaminophen   Tablet .. 650 milliGRAM(s) Oral every 6 hours  aspirin enteric coated 81 milliGRAM(s) Oral daily  atorvastatin Oral Tab/Cap - Peds 40 milliGRAM(s) Oral daily  ceFAZolin   IVPB 2000 milliGRAM(s) IV Intermittent every 8 hours  dextrose 5%. 1000 milliLiter(s) (50 mL/Hr) IV Continuous <Continuous>  dextrose 50% Injectable 12.5 Gram(s) IV Push once  dextrose 50% Injectable 25 Gram(s) IV Push once  dextrose 50% Injectable 25 Gram(s) IV Push once  digoxin     Tablet 0.125 milliGRAM(s) Oral daily  DULoxetine 90 milliGRAM(s) Oral daily  enoxaparin Injectable 40 milliGRAM(s) SubCutaneous daily  furosemide    Tablet 40 milliGRAM(s) Oral daily  lactated ringers. 1000 milliLiter(s) (50 mL/Hr) IV Continuous <Continuous>  metolazone 2.5 milliGRAM(s) Oral daily  metoprolol succinate  milliGRAM(s) Oral daily  pantoprazole    Tablet 40 milliGRAM(s) Oral before breakfast  sodium chloride 0.9%. 1000 milliLiter(s) (75 mL/Hr) IV Continuous <Continuous>    MEDICATIONS  (PRN):  dextrose 40% Gel 15 Gram(s) Oral once PRN Blood Glucose LESS THAN 70 milliGRAM(s)/deciliter  glucagon  Injectable 1 milliGRAM(s) IntraMuscular once PRN Glucose LESS THAN 70 milligrams/deciliter  HYDROmorphone  Injectable 0.5 milliGRAM(s) IV Push every 10 minutes PRN Moderate Pain (4 - 6)  morphine  - Injectable 2 milliGRAM(s) IV Push every 4 hours PRN Severe Pain (7 - 10)  oxyCODONE    IR 5 milliGRAM(s) Oral every 4 hours PRN Moderate Pain (4 - 6)      FAMILY HISTORY:  Family history of allergies: daughter  Family history of heart disease (Mother)      Allergies    ACE inhibitors (Hives)  enalapril (Hives)  rifampin (Angioedema)  vancomycin (Angioedema)    Intolerances        SOCIAL HISTORY:    [  ] Smoking  [  ] EtOH  [  ] Substance abuse     Home Environment:  [  ] Alone  [ X ] Lives with Family--wife  [  ] Home Health Aide    Dwelling:  [ X ] Private House  [  ] Apartment  [  ] Adult Home/Assisted Living Facility  [  ] Skilled Nursing Facility      [  ] Short Term  [  ] Long Term  [ X ] Stairs 6-step side entry and 6+6 steps inside      Elevator [  ]    FUNCTIONAL STATUS PTA: (Check all that apply)  Ambulation: [  X ]Independent    [  ] Dependent     [  ] Non-Ambulatory  Assistive Device: [ X ] Straight Cane  [  ]  Quad Cane  [  ] Walker  [  ]  Wheelchair  (Independent without device prior to right TKR in October 2019)  ADL: [ X ] Independent  [  ]  Dependent       Vital Signs Last 24 Hrs  T(C): 36.8 (01 Feb 2020 08:00), Max: 37.1 (01 Feb 2020 00:00)  T(F): 98.3 (01 Feb 2020 08:00), Max: 98.8 (01 Feb 2020 00:00)  HR: 102 (01 Feb 2020 08:00) (91 - 105)  BP: 117/69 (01 Feb 2020 08:00) (105/65 - 129/59)  BP(mean): 80 (31 Jan 2020 22:00) (80 - 80)  RR: 19 (01 Feb 2020 08:00) (11 - 20)  SpO2: 93% (01 Feb 2020 08:00) (88% - 100%)      PHYSICAL EXAM: Alert and oriented X 4  GENERAL: Well-developed, well-nourished, in NAD  HEAD:  Normocephalic, atraumatic  EYES: EOMI, PERRLA, sclerae anicteric, conjunctivae not injected  NECK: Supple, No JVD, Normal thyroid  CHEST/LUNG: Clear to auscultation bilaterally; No rales, rhonchi, wheezing  HEART: Regular rate and rhythm; No murmurs, gallops, or rubs  ABDOMEN: Soft, nontender, nondistended; Bowel sounds present  EXTREMITIES:  + intrinsic muscle atrophy hands    NERVOUS SYSTEM:  Cranial Nerves II-XII intact [ X ] Abnormal  [  ]  ROM: WFL all extremities [  ]  Abnormal [X  ] No ROM permitted right knee  Motor Strength: WFL all extremities  [  ]  Abnormal [ X ]  Sensation: intact to light touch [  ] Abnormal [ X ]  decreased sensation to LT LEs>UEs  Reflexes: Symmetric [  ]  Abnormal [  ]    FUNCTIONAL STATUS:--at bedrest for hypotension in PACU  Bed Mobility: Independent [  ]  Supervision [  ]  Needs Assistance [ X ]  N/A [  ]  Transfers: Independent [  ]  Supervision [  ]  Needs Assistance [  ]  N/A [X  ]   Ambulation: Independent [  ]  Supervision [  ]  Needs Assistance [  ]  N/A [X  ]  ADLs: Independent [  ] Requires Assistance [ X ] N/A [  ]      LABS:                        9.0    9.25  )-----------( 235      ( 01 Feb 2020 04:30 )             30.1     01-31    136  |  100  |  32<H>  ----------------------------<  130<H>  5.1<H>   |  23  |  1.0    Ca    8.9      31 Jan 2020 02:24  Mg     1.7     02-01    TPro  7.0  /  Alb  3.7  /  TBili  0.9  /  DBili  x   /  AST  35  /  ALT  58<H>  /  AlkPhos  91  01-31    PT/INR - ( 31 Jan 2020 02:24 )   PT: 16.50 sec;   INR: 1.43 ratio         PTT - ( 31 Jan 2020 02:24 )  PTT:35.5 sec      RADIOLOGY & ADDITIONAL STUDIES:      EXAM:  XR KNEE AP LAT OBL 3 VIEWS RT            PROCEDURE DATE:  01/31/2020            INTERPRETATION:  Clinical History / Reason for exam: Trauma.    Comparison: None.    Procedure: 2 views of the right knee.    Findings:  Patient is post right knee replacement without evidence for hardware malfunction. No acute fracture or dislocation. Soft tissue injury noted.    Impression:  Post right knee replacement without malfunction.  No acute fracture or dislocation. Soft tissue injury.        EXAM:  XR HIP 2-3V RT            PROCEDURE DATE:  01/31/2020            INTERPRETATION:  Clinical history/Reason for exam: Trauma.    Comparison: Same exam from 90 minutes prior.    Procedure: Single frontal view of the pelvis and focused view of the right hip    Findings:  Intertrochanteric fracture of the right hip, with varus angulation of the   distal fragment.     Subtrochanteric extension with reverse obliquity and avulsion of the lesser   trochanter.     Femoral head remains in anatomic alignment.     Impression:  Unchanged appearance of comminuted right hip fracture as above

## 2020-02-01 NOTE — PROGRESS NOTE ADULT - SUBJECTIVE AND OBJECTIVE BOX
Progress Note:  Provider Speciality                            Hospitalist      FLOWER MARISCAL [id number] 64y Male     CHIEF PRESENTING COMPLAINT:  Patient is a 64y old  Male who presents with a chief complaint of intertrochanteric fracture of right hip (01 Feb 2020 11:17)        SUBJECTIVE:  Patient was seen and examined at bedside. Reports improvement in  presenting complaint. No significant overnight events reported.     HISTORY OF PRESENTING ILLNESS:  HPI:  The patient is a 64 year-old male with a PMH of CAD s/p CABG, HFrEF (11%), DM (on insulin pump), pulmonary hypertension, Celiac disease, diverticulitis, s/p right total knee replacement October 2019 complicated by infection s/p abx course w/ PICC, presenting following a mechanical fall. He reports that over the last several days he has been feeling more tired; last night he felt especially tired and sat down in the chair. After then getting up to go to the kitchen, he "fell asleep standing," landing on his right buttock. He reports he had been asleep for about 20 seconds. He denies any associated loss of bladder or bowel continence, dizziness or lightheadedness, nausea or vomiting prior to or after the episode. His wife then called EMS and he presented to the ED. On presentation, vitals were T 97.8 F, HR 97, /78; trops 0.03. Right hip xray revealed an intertrochanteric fracture of the right hip, with varus angulation of the distal fragment, subtrochanteric extension with reverse obliquity and avulsion of the lesser trochanter. On admission the patient was resting comfortably; denied any chest pain, shortness of breath, dizziness, confusion, subjective fever, chills, nausea, vomiting, diarrhea. He lives w/ his wife at home; ambulates w/ a cane at baseline. (31 Jan 2020 08:40)        REVIEW OF SYSTEMS:  Patient denies any headache, any vision complaints, runny nose, fever, chills, sore throat. Denies chest pain, shortness of breath, palpitation. Denies nausea, vomiting, abdominal pain, diarrhoea, Denies urinary burning, urgency, frequency, dysuria. Denies weakness in any part of the body or numbness.   At least 10 systems were reviewed in ROS. All systems reviewed  are within normal limits except for the complaints as described in Subjective.    PAST MEDICAL & SURGICAL HISTORY:  PAST MEDICAL & SURGICAL HISTORY:  Diabetic neuropathy  STEMI (ST elevation myocardial infarction)  Diverticulitis  MRSA bacteremia  History of celiac disease  CHF (congestive heart failure): EF ~ 25%  HTN (hypertension)  Diabetes: on insulin pump  Blood clot due to device, implant, or graft: was on blood thinners  HLD (hyperlipidemia)  Osteoarthritis  Atherosclerosis of coronary artery: CAD (coronary artery disease)  Status post percutaneous transluminal coronary angioplasty: in 2012  Surgery, elective: Right shoulder  Surgery, elective: right knee wound debridement  S/P TKR (total knee replacement), right: with infection Mrsa   per pt he was cleared from MRSA infection  S/P CABG x 1: 2018  Stented coronary artery: 10/18 heart attack  INFERIOR WALL MI  Other postprocedural status: Fixation hardware in foot LEFT          VITAL SIGNS:  Vital Signs Last 24 Hrs  T(C): 36.8 (01 Feb 2020 08:00), Max: 37.1 (01 Feb 2020 00:00)  T(F): 98.3 (01 Feb 2020 08:00), Max: 98.8 (01 Feb 2020 00:00)  HR: 88 (01 Feb 2020 10:00) (88 - 105)  BP: 97/57 (01 Feb 2020 10:00) (97/57 - 129/59)  BP(mean): 80 (31 Jan 2020 22:00) (80 - 80)  RR: 20 (01 Feb 2020 10:00) (11 - 20)  SpO2: 93% (01 Feb 2020 10:00) (88% - 100%)          PHYSICAL EXAMINATION:  Not in acute distress  General: No pallor, no icterus  HEENT:   EOMI, no JVD,.  Heart: S1+S2 audible  Lungs: bilateral  fair air entry, no wheezing, no crepitations.  Abdomen: Soft, non-tender, non-distended , no  rigidity or guarding.  CNS: AAOx3, CN  grossly intact.  Extremities:  No edema            CONSULTS:  Consultant(s) Notes Reviewed by me.   Care Discussed with Consultants/Other Providers where required.        MEDICATIONS:  MEDICATIONS  (STANDING):  acetaminophen   Tablet .. 650 milliGRAM(s) Oral every 6 hours  aspirin enteric coated 81 milliGRAM(s) Oral daily  atorvastatin Oral Tab/Cap - Peds 40 milliGRAM(s) Oral daily  ceFAZolin   IVPB 2000 milliGRAM(s) IV Intermittent every 8 hours  dextrose 5%. 1000 milliLiter(s) (50 mL/Hr) IV Continuous <Continuous>  dextrose 50% Injectable 12.5 Gram(s) IV Push once  dextrose 50% Injectable 25 Gram(s) IV Push once  dextrose 50% Injectable 25 Gram(s) IV Push once  digoxin     Tablet 0.125 milliGRAM(s) Oral daily  DULoxetine 90 milliGRAM(s) Oral daily  enoxaparin Injectable 40 milliGRAM(s) SubCutaneous daily  furosemide    Tablet 40 milliGRAM(s) Oral daily  lactated ringers. 1000 milliLiter(s) (50 mL/Hr) IV Continuous <Continuous>  magnesium sulfate  IVPB 1 Gram(s) IV Intermittent once  metolazone 2.5 milliGRAM(s) Oral daily  metoprolol succinate  milliGRAM(s) Oral daily  pantoprazole    Tablet 40 milliGRAM(s) Oral before breakfast  sodium chloride 0.9%. 1000 milliLiter(s) (75 mL/Hr) IV Continuous <Continuous>  sodium chloride 0.9%. 500 milliLiter(s) (500 mL/Hr) IV Continuous <Continuous>    MEDICATIONS  (PRN):  dextrose 40% Gel 15 Gram(s) Oral once PRN Blood Glucose LESS THAN 70 milliGRAM(s)/deciliter  glucagon  Injectable 1 milliGRAM(s) IntraMuscular once PRN Glucose LESS THAN 70 milligrams/deciliter  HYDROmorphone  Injectable 0.5 milliGRAM(s) IV Push every 10 minutes PRN Moderate Pain (4 - 6)  morphine  - Injectable 2 milliGRAM(s) IV Push every 4 hours PRN Severe Pain (7 - 10)  oxyCODONE    IR 5 milliGRAM(s) Oral every 4 hours PRN Moderate Pain (4 - 6)            ASSESSMENT:      The patient is a 64 year-old male with a PMH of CAD s/p CABG, HFrEF (25%), DM (on insulin pump), pulmonary hypertension, Celiac disease, diverticulitis, s/p right total knee replacement October 2019 complicated by infection s/p abx course w/ PICC, presenting following a mechanical fall, admitted for right hip intertrochanteric fracture.      ASSESSMENT:  Principal Diagnosis:  Mechanical fall s/p right hip intertrochanteric fracture, status post IMN fixation, 02/01  Hypotension'  Hypomagnesemia   S/p right knee total replacement complicated by infection    Associated Active Comorbid Conditions:  CAD s/p CABG,   HFrEF (25%),   DM (on insulin pump),   pulmonary hypertension,   Celiac disease      PLAN:    - Mechanical fall s/p right hip intertrochanteric fracture, status post IMN fixation  -Hypotension- 500 ml /hr NS bolus ( caution about fluid overload, EF <20  -S/p right knee total replacement complicated by infection- continue with IV antibiotics   - pain management   - activity as per ortho  - diet resumed  -PT/Rehab consult.   - continue with with home meds, hold Metoprolol & Lasix for  now  -Insulin sliding scale with coverage with meals and QHS   -Electrolyte supplementation and follow up with BMP. Keep K+>4, Mg>2   -DVT prophylaxis - Lovenox    Progress note Handoff:   Pending: PT/Rehab consult.   Discussion: Diagnosis, current management plan and further plan of care discussed with patient  and housestaff in morning  rounds.  Disposition: Inpatient rehab when stable

## 2020-02-01 NOTE — PROGRESS NOTE ADULT - SUBJECTIVE AND OBJECTIVE BOX
Orthopaedics Progress Note    FLOWER MARISCAL  [id number]    Patient is a 64y year old Male with right hip fracture and right knee wound  POD#1 from right hip IMN  Patient seen and examined bedside  Pain well controlled  No other complaints    acetaminophen   Tablet .. 650 milliGRAM(s) Oral every 6 hours  aspirin enteric coated 81 milliGRAM(s) Oral daily  atorvastatin Oral Tab/Cap - Peds 40 milliGRAM(s) Oral daily  ceFAZolin   IVPB 2000 milliGRAM(s) IV Intermittent every 8 hours  dextrose 40% Gel 15 Gram(s) Oral once PRN  dextrose 5%. 1000 milliLiter(s) IV Continuous <Continuous>  dextrose 50% Injectable 12.5 Gram(s) IV Push once  dextrose 50% Injectable 25 Gram(s) IV Push once  dextrose 50% Injectable 25 Gram(s) IV Push once  digoxin     Tablet 0.125 milliGRAM(s) Oral daily  DULoxetine 90 milliGRAM(s) Oral daily  enoxaparin Injectable 40 milliGRAM(s) SubCutaneous daily  furosemide    Tablet 40 milliGRAM(s) Oral daily  glucagon  Injectable 1 milliGRAM(s) IntraMuscular once PRN  HYDROmorphone  Injectable 0.5 milliGRAM(s) IV Push every 10 minutes PRN  lactated ringers. 1000 milliLiter(s) IV Continuous <Continuous>  metolazone 2.5 milliGRAM(s) Oral daily  metoprolol succinate  milliGRAM(s) Oral daily  morphine  - Injectable 2 milliGRAM(s) IV Push every 4 hours PRN  oxyCODONE    IR 5 milliGRAM(s) Oral every 4 hours PRN  pantoprazole    Tablet 40 milliGRAM(s) Oral before breakfast  sodium chloride 0.9%. 1000 milliLiter(s) IV Continuous <Continuous>      T(C): 36.8 (02-01-20 @ 06:00), Max: 37.1 (02-01-20 @ 00:00)  HR: 105 (02-01-20 @ 06:00) (91 - 105)  BP: 122/71 (02-01-20 @ 06:00) (105/65 - 129/59)  RR: 17 (02-01-20 @ 06:00) (11 - 20)  SpO2: 96% (02-01-20 @ 06:00) (88% - 100%)    Physical Exam  NAD  Breathing comfortably on RA  Resting comfortably  RLE  Dressing c/d/i  Motor: TA/EHL/Gastroc intact  Sensory: SP/DP/Dueñas/Sa intact  Vasc: foot WWP, 2+ DP pulse    Labs                        9.0    9.25  )-----------( 235      ( 01 Feb 2020 04:30 )             30.1     01-31    136  |  100  |  32<H>  ----------------------------<  130<H>  5.1<H>   |  23  |  1.0    Ca    8.9      31 Jan 2020 02:24  Mg     1.7     02-01    TPro  7.0  /  Alb  3.7  /  TBili  0.9  /  DBili  x   /  AST  35  /  ALT  58<H>  /  AlkPhos  91  01-31    LIVER FUNCTIONS - ( 31 Jan 2020 02:24 )  Alb: 3.7 g/dL / Pro: 7.0 g/dL / ALK PHOS: 91 U/L / ALT: 58 U/L / AST: 35 U/L / GGT: x           PT/INR - ( 31 Jan 2020 02:24 )   PT: 16.50 sec;   INR: 1.43 ratio         PTT - ( 31 Jan 2020 02:24 )  PTT:35.5 sec    A/P: Patient is a 64y year old Male as above    Regarding right knee wound: follow up with Antony Banks surgeon who has been managing this wound (Ortho spoke to his team yesterday and confirmed the plan for follow up with this team)  WBAT on RLE  DVT ppx: SCD, LVX  Abx: Ancef  Pain control  Home medications: resume  Trend labs  Dispo: Pending PT recommendations

## 2020-02-01 NOTE — CONSULT NOTE ADULT - CONSULT REASON
CC:  ·  PRE-OP DIAGNOSIS:  Intertrochanteric fracture of right hip 31-Jan-2020 17:28:01  Ranjith Hassan.  ·  POST-OP DIAGNOSIS:  Intertrochanteric fracture of right hip 31-Jan-2020 17:28:11  Ranjith Hassan.  ·  PROCEDURES:  Open reduction and internal fixation of right hip using intramedullary hip screw 31-Jan-2020 17:27:34  Ranjith Hasasn.

## 2020-02-02 LAB
ANION GAP SERPL CALC-SCNC: 13 MMOL/L — SIGNIFICANT CHANGE UP (ref 7–14)
BUN SERPL-MCNC: 37 MG/DL — HIGH (ref 10–20)
CALCIUM SERPL-MCNC: 8.5 MG/DL — SIGNIFICANT CHANGE UP (ref 8.5–10.1)
CHLORIDE SERPL-SCNC: 106 MMOL/L — SIGNIFICANT CHANGE UP (ref 98–110)
CO2 SERPL-SCNC: 21 MMOL/L — SIGNIFICANT CHANGE UP (ref 17–32)
CREAT SERPL-MCNC: 1.4 MG/DL — SIGNIFICANT CHANGE UP (ref 0.7–1.5)
GLUCOSE BLDC GLUCOMTR-MCNC: 143 MG/DL — HIGH (ref 70–99)
GLUCOSE BLDC GLUCOMTR-MCNC: 158 MG/DL — HIGH (ref 70–99)
GLUCOSE BLDC GLUCOMTR-MCNC: 199 MG/DL — HIGH (ref 70–99)
GLUCOSE BLDC GLUCOMTR-MCNC: 242 MG/DL — HIGH (ref 70–99)
GLUCOSE SERPL-MCNC: 176 MG/DL — HIGH (ref 70–99)
HCT VFR BLD CALC: 30 % — LOW (ref 42–52)
HGB BLD-MCNC: 9.2 G/DL — LOW (ref 14–18)
MCHC RBC-ENTMCNC: 22.4 PG — LOW (ref 27–31)
MCHC RBC-ENTMCNC: 30.7 G/DL — LOW (ref 32–37)
MCV RBC AUTO: 73.2 FL — LOW (ref 80–94)
NRBC # BLD: 0 /100 WBCS — SIGNIFICANT CHANGE UP (ref 0–0)
PLATELET # BLD AUTO: 219 K/UL — SIGNIFICANT CHANGE UP (ref 130–400)
POTASSIUM SERPL-MCNC: 4.8 MMOL/L — SIGNIFICANT CHANGE UP (ref 3.5–5)
POTASSIUM SERPL-SCNC: 4.8 MMOL/L — SIGNIFICANT CHANGE UP (ref 3.5–5)
RBC # BLD: 4.1 M/UL — LOW (ref 4.7–6.1)
RBC # FLD: 19.5 % — HIGH (ref 11.5–14.5)
SODIUM SERPL-SCNC: 140 MMOL/L — SIGNIFICANT CHANGE UP (ref 135–146)
WBC # BLD: 8.18 K/UL — SIGNIFICANT CHANGE UP (ref 4.8–10.8)
WBC # FLD AUTO: 8.18 K/UL — SIGNIFICANT CHANGE UP (ref 4.8–10.8)

## 2020-02-02 PROCEDURE — 99233 SBSQ HOSP IP/OBS HIGH 50: CPT

## 2020-02-02 PROCEDURE — 99231 SBSQ HOSP IP/OBS SF/LOW 25: CPT

## 2020-02-02 RX ORDER — COLLAGENASE CLOSTRIDIUM HIST. 250 UNIT/G
1 OINTMENT (GRAM) TOPICAL
Refills: 0 | Status: DISCONTINUED | OUTPATIENT
Start: 2020-02-02 | End: 2020-02-04

## 2020-02-02 RX ADMIN — MORPHINE SULFATE 2 MILLIGRAM(S): 50 CAPSULE, EXTENDED RELEASE ORAL at 22:45

## 2020-02-02 RX ADMIN — Medication 650 MILLIGRAM(S): at 06:00

## 2020-02-02 RX ADMIN — Medication 0.12 MILLIGRAM(S): at 06:01

## 2020-02-02 RX ADMIN — Medication 40 MILLIGRAM(S): at 08:39

## 2020-02-02 RX ADMIN — OXYCODONE HYDROCHLORIDE 5 MILLIGRAM(S): 5 TABLET ORAL at 01:28

## 2020-02-02 RX ADMIN — Medication 650 MILLIGRAM(S): at 12:01

## 2020-02-02 RX ADMIN — OXYCODONE HYDROCHLORIDE 5 MILLIGRAM(S): 5 TABLET ORAL at 01:15

## 2020-02-02 RX ADMIN — DULOXETINE HYDROCHLORIDE 90 MILLIGRAM(S): 30 CAPSULE, DELAYED RELEASE ORAL at 11:34

## 2020-02-02 RX ADMIN — PANTOPRAZOLE SODIUM 40 MILLIGRAM(S): 20 TABLET, DELAYED RELEASE ORAL at 06:01

## 2020-02-02 RX ADMIN — ENOXAPARIN SODIUM 40 MILLIGRAM(S): 100 INJECTION SUBCUTANEOUS at 11:35

## 2020-02-02 RX ADMIN — ATORVASTATIN CALCIUM 40 MILLIGRAM(S): 80 TABLET, FILM COATED ORAL at 11:35

## 2020-02-02 RX ADMIN — Medication 150 MILLIGRAM(S): at 08:39

## 2020-02-02 RX ADMIN — Medication 650 MILLIGRAM(S): at 06:54

## 2020-02-02 RX ADMIN — Medication 1 TABLET(S): at 17:15

## 2020-02-02 RX ADMIN — Medication 650 MILLIGRAM(S): at 11:33

## 2020-02-02 RX ADMIN — Medication 650 MILLIGRAM(S): at 23:54

## 2020-02-02 RX ADMIN — MORPHINE SULFATE 2 MILLIGRAM(S): 50 CAPSULE, EXTENDED RELEASE ORAL at 22:12

## 2020-02-02 RX ADMIN — MORPHINE SULFATE 2 MILLIGRAM(S): 50 CAPSULE, EXTENDED RELEASE ORAL at 17:40

## 2020-02-02 RX ADMIN — MORPHINE SULFATE 2 MILLIGRAM(S): 50 CAPSULE, EXTENDED RELEASE ORAL at 17:15

## 2020-02-02 RX ADMIN — Medication 81 MILLIGRAM(S): at 11:34

## 2020-02-02 RX ADMIN — Medication 1 APPLICATION(S): at 21:42

## 2020-02-02 RX ADMIN — Medication 650 MILLIGRAM(S): at 17:15

## 2020-02-02 NOTE — PROGRESS NOTE ADULT - ASSESSMENT
The patient is a 64 year-old male with a PMH of CAD s/p CABG, HFrEF (25%), DM (on insulin pump), pulmonary hypertension, Celiac disease, diverticulitis, s/p right total knee replacement October 2019 complicated by infection s/p abx course w/ PICC, presenting following a mechanical fall, admitted for right hip intertrochanteric fracture.    # Mechanical fall s/p right hip intertrochanteric fracture  -Xray hip showed an intertrochanteric fracture of right hip w/ varus angulation of distal fragment; subtrochanteric extension w/ reverse obliquity and avulsion of lesser trochanter  -POD 2 s/p IMN fixation   -morphine, oxycodone PRN   -PT/rehab: possible 4a candidate         # CAD s/p CABG  -C/w aspirin 81 mg PO qD,  metoprolol succinate  mg PO qD  -Holding prasugrel 10 mg PO qD for right hip fracture repair (patient reports he last took the prasugrel 3 days ago as he did not feel he required it)      # HFrEF (EF 11% in December 2019)  -Patient reports he was seen by Dr. Garcia who recommended a biventricular pacemaker; no intervention done as of yet  -C/w digoxin 125 mcg PO qD,  metolazone 2.5 mg PO, metoprolol succinate  mg PO qD  -Vitals per routine    # S/p right knee total replacement complicated by infection s/p abx w/ PICC  Patient reports he had been on PO doxycycline to which he developed a reaction (edema in hands)  Switched to Augmentin 875/125 PO twice daily but he does not know the recommended abx course  s/p IV cefazolin   restart?? Augmentin 875/125 mg PO twice daily for now    # DM w/ peripheral neuropathy  On insulin pump; on Trulicity/Jardiance at home  FS qAC, qHS; if > 180, adjust insulin pump as needed  C/w duloxetine 90 mg PO qD    # HLD  On Zetia at home  Start atorvastatin 40 mg PO qHS for now      # DVT ppx- Lovenox 40 mg SQ qHS  # GI ppx- Protonix 40 mg PO qAM  # Diet- carbohydrate consistent   # Activity- ambulate as tolerated   # Dispo- possible 4a  # Full code

## 2020-02-02 NOTE — PHYSICAL THERAPY INITIAL EVALUATION ADULT - PERTINENT HX OF CURRENT PROBLEM, REHAB EVAL
The patient is a 64 year-old male with a PMH of CAD s/p CABG, HFrEF (11%), DM (on insulin pump), pulmonary hypertension, Celiac disease, diverticulitis, s/p right total knee replacement October 2019 complicated by infection s/p abx course w/ PICC, presenting following a mechanical fall.

## 2020-02-02 NOTE — PROVIDER CONTACT NOTE (OTHER) - SITUATION
Pt has his own insulin pump. His last BG was 242. He gave himself a bolus, pt refuses for staff to give him insulin.

## 2020-02-02 NOTE — PROGRESS NOTE ADULT - SUBJECTIVE AND OBJECTIVE BOX
Progress Note:  Provider Speciality                            Hospitalist      FLOWER MARISCAL [id number] 64y Male     CHIEF PRESENTING COMPLAINT:  Patient is a 64y old  Male who presents with a chief complaint of intertrochanteric fracture of right hip (01 Feb 2020 11:17)        SUBJECTIVE:  Patient was seen and examined at bedside. Reports improvement in  presenting complaint. No significant overnight events reported.     HISTORY OF PRESENTING ILLNESS:  HPI:  The patient is a 64 year-old male with a PMH of CAD s/p CABG, HFrEF (11%), DM (on insulin pump), pulmonary hypertension, Celiac disease, diverticulitis, s/p right total knee replacement October 2019 complicated by infection s/p abx course w/ PICC, presenting following a mechanical fall. He reports that over the last several days he has been feeling more tired; last night he felt especially tired and sat down in the chair. After then getting up to go to the kitchen, he "fell asleep standing," landing on his right buttock. He reports he had been asleep for about 20 seconds. He denies any associated loss of bladder or bowel continence, dizziness or lightheadedness, nausea or vomiting prior to or after the episode. His wife then called EMS and he presented to the ED. On presentation, vitals were T 97.8 F, HR 97, /78; trops 0.03. Right hip xray revealed an intertrochanteric fracture of the right hip, with varus angulation of the distal fragment, subtrochanteric extension with reverse obliquity and avulsion of the lesser trochanter. On admission the patient was resting comfortably; denied any chest pain, shortness of breath, dizziness, confusion, subjective fever, chills, nausea, vomiting, diarrhea. He lives w/ his wife at home; ambulates w/ a cane at baseline. (31 Jan 2020 08:40)        REVIEW OF SYSTEMS:  Patient denies any headache, any vision complaints, runny nose, fever, chills, sore throat. Denies chest pain, shortness of breath, palpitation. Denies nausea, vomiting, abdominal pain, diarrhoea, Denies urinary burning, urgency, frequency, dysuria. Denies weakness in any part of the body or numbness.   At least 10 systems were reviewed in ROS. All systems reviewed  are within normal limits except for the complaints as described in Subjective.    PAST MEDICAL & SURGICAL HISTORY:  PAST MEDICAL & SURGICAL HISTORY:  Diabetic neuropathy  STEMI (ST elevation myocardial infarction)  Diverticulitis  MRSA bacteremia  History of celiac disease  CHF (congestive heart failure): EF ~ 25%  HTN (hypertension)  Diabetes: on insulin pump  Blood clot due to device, implant, or graft: was on blood thinners  HLD (hyperlipidemia)  Osteoarthritis  Atherosclerosis of coronary artery: CAD (coronary artery disease)  Status post percutaneous transluminal coronary angioplasty: in 2012  Surgery, elective: Right shoulder  Surgery, elective: right knee wound debridement  S/P TKR (total knee replacement), right: with infection Mrsa   per pt he was cleared from MRSA infection  S/P CABG x 1: 2018  Stented coronary artery: 10/18 heart attack  INFERIOR WALL MI  Other postprocedural status: Fixation hardware in foot LEFT          VITAL SIGNS:  Vital Signs Last 24 Hrs  T(C): 36.1 (02 Feb 2020 11:42), Max: 36.9 (01 Feb 2020 16:00)  T(F): 97 (02 Feb 2020 11:42), Max: 98.5 (01 Feb 2020 16:00)  HR: 77 (02 Feb 2020 11:42) (73 - 89)  BP: 96/55 (02 Feb 2020 11:42) (76/52 - 113/69)  BP(mean): --  RR: 18 (02 Feb 2020 11:42) (15 - 22)  SpO2: 93% (01 Feb 2020 20:30) (93% - 95%)        PHYSICAL EXAMINATION:  Not in acute distress  General: No pallor, no icterus  HEENT:   EOMI, no JVD,.  Heart: S1+S2 audible  Lungs: bilateral  fair air entry, no wheezing, no crepitations.  Abdomen: Soft, non-tender, non-distended , no  rigidity or guarding.  CNS: AAOx3, CN  grossly intact.  Extremities:  No edema            CONSULTS:  Consultant(s) Notes Reviewed by me.   Care Discussed with Consultants/Other Providers where required.        MEDICATIONS:  MEDICATIONS  (STANDING):  acetaminophen   Tablet .. 650 milliGRAM(s) Oral every 6 hours  aspirin enteric coated 81 milliGRAM(s) Oral daily  atorvastatin Oral Tab/Cap - Peds 40 milliGRAM(s) Oral daily  ceFAZolin   IVPB 2000 milliGRAM(s) IV Intermittent every 8 hours  dextrose 5%. 1000 milliLiter(s) (50 mL/Hr) IV Continuous <Continuous>  dextrose 50% Injectable 12.5 Gram(s) IV Push once  dextrose 50% Injectable 25 Gram(s) IV Push once  dextrose 50% Injectable 25 Gram(s) IV Push once  digoxin     Tablet 0.125 milliGRAM(s) Oral daily  DULoxetine 90 milliGRAM(s) Oral daily  enoxaparin Injectable 40 milliGRAM(s) SubCutaneous daily  furosemide    Tablet 40 milliGRAM(s) Oral daily  lactated ringers. 1000 milliLiter(s) (50 mL/Hr) IV Continuous <Continuous>  magnesium sulfate  IVPB 1 Gram(s) IV Intermittent once  metolazone 2.5 milliGRAM(s) Oral daily  metoprolol succinate  milliGRAM(s) Oral daily  pantoprazole    Tablet 40 milliGRAM(s) Oral before breakfast  sodium chloride 0.9%. 1000 milliLiter(s) (75 mL/Hr) IV Continuous <Continuous>  sodium chloride 0.9%. 500 milliLiter(s) (500 mL/Hr) IV Continuous <Continuous>    MEDICATIONS  (PRN):  dextrose 40% Gel 15 Gram(s) Oral once PRN Blood Glucose LESS THAN 70 milliGRAM(s)/deciliter  glucagon  Injectable 1 milliGRAM(s) IntraMuscular once PRN Glucose LESS THAN 70 milligrams/deciliter  HYDROmorphone  Injectable 0.5 milliGRAM(s) IV Push every 10 minutes PRN Moderate Pain (4 - 6)  morphine  - Injectable 2 milliGRAM(s) IV Push every 4 hours PRN Severe Pain (7 - 10)  oxyCODONE    IR 5 milliGRAM(s) Oral every 4 hours PRN Moderate Pain (4 - 6)            ASSESSMENT:      The patient is a 64 year-old male with a PMH of CAD s/p CABG, HFrEF (25%), DM (on insulin pump), pulmonary hypertension, Celiac disease, diverticulitis, s/p right total knee replacement October 2019 complicated by infection s/p abx course w/ PICC, presenting following a mechanical fall, admitted for right hip intertrochanteric fracture.      ASSESSMENT:  Principal Diagnosis:  Mechanical fall s/p right hip intertrochanteric fracture, status post IMN fixation, 02/01  Hypotension'  Hypomagnesemia   Valerie on Chronic renal disease stage 2,, pre-renal secondary to hypotension      Associated Active Comorbid Conditions:  S/p right knee total replacement complicated by infection, prior to admission  CAD s/p CABG,   HFrEF (25%),   DM (on insulin pump),   pulmonary hypertension,   Celiac disease      PLAN:    - Mechanical fall s/p right hip intertrochanteric fracture, status post IMN fixation. POD-2  -Hypotension- resolved  -Valerie on Chronic renal disease stage 2,, pre-renal secondary to hypotension. Hold lasix for now  -continue with augmentin for total of 10 days( continued prior to admission for L heel ulcer recommende form his podiatrist)  - pain management   - activity as per ortho. WBAT on RLE  - diet resumed  -PT/Rehab consult.   - continue with with home meds, hold Metoprolol & Lasix for  now  -Insulin sliding scale with coverage with meals and QHS   -Electrolyte supplementation and follow up with BMP. Keep K+>4, Mg>2   -DVT prophylaxis - Lovenox    Progress note Handoff:   Pending: PT/Rehab consult.   Discussion: Diagnosis, current management plan and further plan of care discussed with patient  and housestaff in morning  rounds.  Disposition: Inpatient rehab when stable

## 2020-02-02 NOTE — CONSULT NOTE ADULT - SUBJECTIVE AND OBJECTIVE BOX
HPI:  The patient is a 64 year-old male with a PMH of CAD s/p CABG, HFrEF (11%), DM (on insulin pump), pulmonary hypertension, Celiac disease, diverticulitis, s/p right total knee replacement October 2019 complicated by infection s/p abx course w/ PICC, presenting following a mechanical fall. He reports that over the last several days he has been feeling more tired; last night he felt especially tired and sat down in the chair. After then getting up to go to the kitchen, he "fell asleep standing," landing on his right buttock. He reports he had been asleep for about 20 seconds. He denies any associated loss of bladder or bowel continence, dizziness or lightheadedness, nausea or vomiting prior to or after the episode. His wife then called EMS and he presented to the ED. On presentation, vitals were T 97.8 F, HR 97, /78; trops 0.03. Right hip xray revealed an intertrochanteric fracture of the right hip, with varus angulation of the distal fragment, subtrochanteric extension with reverse obliquity and avulsion of the lesser trochanter. On admission the patient was resting comfortably; denied any chest pain, shortness of breath, dizziness, confusion, subjective fever, chills, nausea, vomiting, diarrhea. He lives w/ his wife at home; ambulates w/ a cane at baseline. (31 Jan 2020 08:40)    3 days s/p ORIF of right hip fx.   pt states he had TKR in 10/19. Wound opened and he has had local wound care including NPWt that was discontinued several weeks ago . Aske to eval for continued wound care     PAST MEDICAL & SURGICAL HISTORY:  Diabetic neuropathy  STEMI (ST elevation myocardial infarction)  Diverticulitis  MRSA bacteremia  History of celiac disease  CHF (congestive heart failure): EF ~ 25%  HTN (hypertension)  Diabetes: on insulin pump  Blood clot due to device, implant, or graft: was on blood thinners  HLD (hyperlipidemia)  Osteoarthritis  Atherosclerosis of coronary artery: CAD (coronary artery disease)  Status post percutaneous transluminal coronary angioplasty: in 2012  Surgery, elective: Right shoulder  Surgery, elective: right knee wound debridement  S/P TKR (total knee replacement), right: with infection Mrsa   per pt he was cleared from MRSA infection  S/P CABG x 1: 2018  Stented coronary artery: 10/18 heart attack  INFERIOR WALL MI  Other postprocedural status: Fixation hardware in foot LEFT    Allergies    ACE inhibitors (Hives)  enalapril (Hives)  rifampin (Angioedema)  vancomycin (Angioedema)    Intolerances                          9.2    8.18  )-----------( 219      ( 02 Feb 2020 06:25 )             30.0     EF - 28% per pt     EXAM;   awake alert comfortable     Right knee - 12 x 6 cm open incision/  wound anterior knee -  pink granulation with patchy yellow white areas   edge of wound dessicated yellow gray tissue

## 2020-02-02 NOTE — PROGRESS NOTE ADULT - SUBJECTIVE AND OBJECTIVE BOX
HPI  Patient is a 64y old Male who presents with a chief complaint of intertrochanteric fracture of right hip (01 Feb 2020 11:41)    Currently admitted to medicine with the primary diagnosis of Intertrochanteric fracture of right hip     Today is hospital day 2d.     INTERVAL HPI / OVERNIGHT EVENTS:  Patient was examined and seen at bedside. This morning he is resting comfortably in bed and reports no new issues or overnight events.     ROS: Otherwise unremarkable     PAST MEDICAL & SURGICAL HISTORY  Diabetic neuropathy  STEMI (ST elevation myocardial infarction)  Diverticulitis  MRSA bacteremia  History of celiac disease  CHF (congestive heart failure): EF ~ 25%  HTN (hypertension)  Diabetes: on insulin pump  Blood clot due to device, implant, or graft: was on blood thinners  HLD (hyperlipidemia)  Osteoarthritis  Atherosclerosis of coronary artery: CAD (coronary artery disease)  Status post percutaneous transluminal coronary angioplasty: in 2012  Surgery, elective: Right shoulder  Surgery, elective: right knee wound debridement  S/P TKR (total knee replacement), right: with infection Mrsa   per pt he was cleared from MRSA infection  S/P CABG x 1: 2018  Stented coronary artery: 10/18 heart attack  INFERIOR WALL MI  Other postprocedural status: Fixation hardware in foot LEFT    ALLERGIES  ACE inhibitors (Hives)  enalapril (Hives)  rifampin (Angioedema)  vancomycin (Angioedema)    MEDICATIONS  STANDING MEDICATIONS  acetaminophen   Tablet .. 650 milliGRAM(s) Oral every 6 hours  aspirin enteric coated 81 milliGRAM(s) Oral daily  atorvastatin Oral Tab/Cap - Peds 40 milliGRAM(s) Oral daily  dextrose 5%. 1000 milliLiter(s) IV Continuous <Continuous>  dextrose 50% Injectable 12.5 Gram(s) IV Push once  dextrose 50% Injectable 25 Gram(s) IV Push once  dextrose 50% Injectable 25 Gram(s) IV Push once  digoxin     Tablet 0.125 milliGRAM(s) Oral daily  DULoxetine 90 milliGRAM(s) Oral daily  enoxaparin Injectable 40 milliGRAM(s) SubCutaneous daily  furosemide    Tablet 40 milliGRAM(s) Oral daily  metolazone 2.5 milliGRAM(s) Oral daily  metoprolol succinate  milliGRAM(s) Oral daily  pantoprazole    Tablet 40 milliGRAM(s) Oral before breakfast  sodium chloride 0.9%. 1000 milliLiter(s) IV Continuous <Continuous>  sodium chloride 0.9%. 500 milliLiter(s) IV Continuous <Continuous>    PRN MEDICATIONS  dextrose 40% Gel 15 Gram(s) Oral once PRN  glucagon  Injectable 1 milliGRAM(s) IntraMuscular once PRN  morphine  - Injectable 2 milliGRAM(s) IV Push every 4 hours PRN  oxyCODONE    IR 5 milliGRAM(s) Oral every 4 hours PRN    VITALS:  T(F): 98.4  HR: 85  BP: 112/64  RR: 18  SpO2: 93%    PHYSICAL EXAM  GEN: NAD, Resting comfortably in bed  PULM: Clear to auscultation bilaterally, No wheezes  CVS: Regular rate and rhythm, S1-S2, no murmurs  ABD: Soft, non-tender, non-distended, no guarding  EXT: No edema  NEURO: A&Ox3, no focal deficits    LABS                        9.2    8.18  )-----------( 219      ( 02 Feb 2020 06:25 )             30.0       Mg     1.7     02-01                CARDIAC MARKERS ( 31 Jan 2020 11:22 )  x     / 0.02 ng/mL / x     / x     / x          RADIOLOGY

## 2020-02-02 NOTE — CONSULT NOTE ADULT - ASSESSMENT
Chronic right knee wound s/p dehiscence of TKR incision   Rec: Santyl and moist dressing to site   Rec:  STSG once  possibly this week    Discussed wth pt . Concerns addressed

## 2020-02-02 NOTE — PROGRESS NOTE ADULT - SUBJECTIVE AND OBJECTIVE BOX
Orthopaedics Progress Note    FLOWER MARISCAL  [id number]    Patient is a 64y year old Male with right hip fracture  POD#2 from right hip IMN  Patient seen and examined bedside  Pain well controlled  Did not work with PT yesterday due to low blood pressure  No other complaints    acetaminophen   Tablet .. 650 milliGRAM(s) Oral every 6 hours  aspirin enteric coated 81 milliGRAM(s) Oral daily  atorvastatin Oral Tab/Cap - Peds 40 milliGRAM(s) Oral daily  dextrose 40% Gel 15 Gram(s) Oral once PRN  dextrose 5%. 1000 milliLiter(s) IV Continuous <Continuous>  dextrose 50% Injectable 12.5 Gram(s) IV Push once  dextrose 50% Injectable 25 Gram(s) IV Push once  dextrose 50% Injectable 25 Gram(s) IV Push once  digoxin     Tablet 0.125 milliGRAM(s) Oral daily  DULoxetine 90 milliGRAM(s) Oral daily  enoxaparin Injectable 40 milliGRAM(s) SubCutaneous daily  furosemide    Tablet 40 milliGRAM(s) Oral daily  glucagon  Injectable 1 milliGRAM(s) IntraMuscular once PRN  metolazone 2.5 milliGRAM(s) Oral daily  metoprolol succinate  milliGRAM(s) Oral daily  morphine  - Injectable 2 milliGRAM(s) IV Push every 4 hours PRN  oxyCODONE    IR 5 milliGRAM(s) Oral every 4 hours PRN  pantoprazole    Tablet 40 milliGRAM(s) Oral before breakfast  sodium chloride 0.9%. 1000 milliLiter(s) IV Continuous <Continuous>  sodium chloride 0.9%. 500 milliLiter(s) IV Continuous <Continuous>      T(C): 35.6 (02-02-20 @ 07:15), Max: 36.9 (02-01-20 @ 16:00)  HR: 89 (02-02-20 @ 07:15) (73 - 89)  BP: 113/69 (02-02-20 @ 07:15) (76/52 - 113/69)  RR: 18 (02-02-20 @ 07:15) (15 - 22)  SpO2: 93% (02-01-20 @ 20:30) (93% - 95%)    Physical Exam  NAD  Breathing comfortably on RA  Resting comfortably  RLE  Dressing c/d/i  Motor: TA/EHL/Gastroc intact  Sensory: SP/DP/Dueñas/Sa intact  Vasc: foot WWP, 2+ DP pulse    Labs                        9.2    8.18  )-----------( 219      ( 02 Feb 2020 06:25 )             30.0     02-02    140  |  106  |  37<H>  ----------------------------<  176<H>  4.8   |  21  |  1.4    Ca    8.5      02 Feb 2020 06:25  Mg     1.7     02-01    A/P: Patient is a 64y year old Male as above    WBAT on RLE  DVT ppx: SCD, LVX  Abx: complete  Pain control  Home medications: resume  Trend labs  Dispo: Pending PT recommendations

## 2020-02-02 NOTE — PROVIDER CONTACT NOTE (OTHER) - SITUATION
Pt has been hypotensive since last night and is due for bp meds MD Lal said to push the medication back a few hours.

## 2020-02-02 NOTE — PHYSICAL THERAPY INITIAL EVALUATION ADULT - GENERAL OBSERVATIONS, REHAB EVAL
Attempted to see pt for PT Initial Evaluation, pt's BP is 86/51, HR 75 bpm. Recommend to hold PT . Pt educated on ROM exercises to LLE, RLE has knee wound from previous TKR.  Will f/u.
Pt encountered supine in bed in NAD, Rt hip dressing c/d/i, c/o Rt hip pain 2/10 at rest and inc pain level to 10/10 scale during mobility. Pt required Max A in bed mobility and seated at EOB. Pt unable to perform sit/stand transfer with Max A secondary inc pain level. Pt returned supine in bed in NAD. RN aware.

## 2020-02-03 LAB
ALBUMIN SERPL ELPH-MCNC: 3.2 G/DL — LOW (ref 3.5–5.2)
ALP SERPL-CCNC: 95 U/L — SIGNIFICANT CHANGE UP (ref 30–115)
ALT FLD-CCNC: 15 U/L — SIGNIFICANT CHANGE UP (ref 0–41)
ANION GAP SERPL CALC-SCNC: 10 MMOL/L — SIGNIFICANT CHANGE UP (ref 7–14)
ANION GAP SERPL CALC-SCNC: 14 MMOL/L — SIGNIFICANT CHANGE UP (ref 7–14)
APTT BLD: 25 SEC — LOW (ref 27–39.2)
AST SERPL-CCNC: 26 U/L — SIGNIFICANT CHANGE UP (ref 0–41)
BASOPHILS # BLD AUTO: 0.05 K/UL — SIGNIFICANT CHANGE UP (ref 0–0.2)
BASOPHILS # BLD AUTO: 0.05 K/UL — SIGNIFICANT CHANGE UP (ref 0–0.2)
BASOPHILS NFR BLD AUTO: 0.5 % — SIGNIFICANT CHANGE UP (ref 0–1)
BASOPHILS NFR BLD AUTO: 0.6 % — SIGNIFICANT CHANGE UP (ref 0–1)
BILIRUB SERPL-MCNC: 1.3 MG/DL — HIGH (ref 0.2–1.2)
BLD GP AB SCN SERPL QL: SIGNIFICANT CHANGE UP
BUN SERPL-MCNC: 41 MG/DL — HIGH (ref 10–20)
BUN SERPL-MCNC: 42 MG/DL — HIGH (ref 10–20)
CALCIUM SERPL-MCNC: 8.5 MG/DL — SIGNIFICANT CHANGE UP (ref 8.5–10.1)
CALCIUM SERPL-MCNC: 8.9 MG/DL — SIGNIFICANT CHANGE UP (ref 8.5–10.1)
CHLORIDE SERPL-SCNC: 98 MMOL/L — SIGNIFICANT CHANGE UP (ref 98–110)
CHLORIDE SERPL-SCNC: 99 MMOL/L — SIGNIFICANT CHANGE UP (ref 98–110)
CO2 SERPL-SCNC: 22 MMOL/L — SIGNIFICANT CHANGE UP (ref 17–32)
CO2 SERPL-SCNC: 27 MMOL/L — SIGNIFICANT CHANGE UP (ref 17–32)
CREAT SERPL-MCNC: 1.3 MG/DL — SIGNIFICANT CHANGE UP (ref 0.7–1.5)
CREAT SERPL-MCNC: 1.3 MG/DL — SIGNIFICANT CHANGE UP (ref 0.7–1.5)
EOSINOPHIL # BLD AUTO: 0.17 K/UL — SIGNIFICANT CHANGE UP (ref 0–0.7)
EOSINOPHIL # BLD AUTO: 0.22 K/UL — SIGNIFICANT CHANGE UP (ref 0–0.7)
EOSINOPHIL NFR BLD AUTO: 1.8 % — SIGNIFICANT CHANGE UP (ref 0–8)
EOSINOPHIL NFR BLD AUTO: 2.6 % — SIGNIFICANT CHANGE UP (ref 0–8)
GLUCOSE BLDC GLUCOMTR-MCNC: 103 MG/DL — HIGH (ref 70–99)
GLUCOSE BLDC GLUCOMTR-MCNC: 128 MG/DL — HIGH (ref 70–99)
GLUCOSE BLDC GLUCOMTR-MCNC: 143 MG/DL — HIGH (ref 70–99)
GLUCOSE BLDC GLUCOMTR-MCNC: 184 MG/DL — HIGH (ref 70–99)
GLUCOSE BLDC GLUCOMTR-MCNC: 251 MG/DL — HIGH (ref 70–99)
GLUCOSE BLDC GLUCOMTR-MCNC: 71 MG/DL — SIGNIFICANT CHANGE UP (ref 70–99)
GLUCOSE SERPL-MCNC: 121 MG/DL — HIGH (ref 70–99)
GLUCOSE SERPL-MCNC: 171 MG/DL — HIGH (ref 70–99)
HCT VFR BLD CALC: 30.8 % — LOW (ref 42–52)
HCT VFR BLD CALC: 34.3 % — LOW (ref 42–52)
HGB BLD-MCNC: 10.4 G/DL — LOW (ref 14–18)
HGB BLD-MCNC: 9.4 G/DL — LOW (ref 14–18)
IMM GRANULOCYTES NFR BLD AUTO: 0.2 % — SIGNIFICANT CHANGE UP (ref 0.1–0.3)
IMM GRANULOCYTES NFR BLD AUTO: 0.3 % — SIGNIFICANT CHANGE UP (ref 0.1–0.3)
INR BLD: 1.85 RATIO — HIGH (ref 0.65–1.3)
LYMPHOCYTES # BLD AUTO: 0.9 K/UL — LOW (ref 1.2–3.4)
LYMPHOCYTES # BLD AUTO: 1.45 K/UL — SIGNIFICANT CHANGE UP (ref 1.2–3.4)
LYMPHOCYTES # BLD AUTO: 16.9 % — LOW (ref 20.5–51.1)
LYMPHOCYTES # BLD AUTO: 9.5 % — LOW (ref 20.5–51.1)
MAGNESIUM SERPL-MCNC: 1.8 MG/DL — SIGNIFICANT CHANGE UP (ref 1.8–2.4)
MAGNESIUM SERPL-MCNC: 2 MG/DL — SIGNIFICANT CHANGE UP (ref 1.8–2.4)
MCHC RBC-ENTMCNC: 21.4 PG — LOW (ref 27–31)
MCHC RBC-ENTMCNC: 21.8 PG — LOW (ref 27–31)
MCHC RBC-ENTMCNC: 30.3 G/DL — LOW (ref 32–37)
MCHC RBC-ENTMCNC: 30.5 G/DL — LOW (ref 32–37)
MCV RBC AUTO: 70.6 FL — LOW (ref 80–94)
MCV RBC AUTO: 71.5 FL — LOW (ref 80–94)
MONOCYTES # BLD AUTO: 1.21 K/UL — HIGH (ref 0.1–0.6)
MONOCYTES # BLD AUTO: 1.45 K/UL — HIGH (ref 0.1–0.6)
MONOCYTES NFR BLD AUTO: 12.7 % — HIGH (ref 1.7–9.3)
MONOCYTES NFR BLD AUTO: 16.9 % — HIGH (ref 1.7–9.3)
NEUTROPHILS # BLD AUTO: 5.39 K/UL — SIGNIFICANT CHANGE UP (ref 1.4–6.5)
NEUTROPHILS # BLD AUTO: 7.16 K/UL — HIGH (ref 1.4–6.5)
NEUTROPHILS NFR BLD AUTO: 62.8 % — SIGNIFICANT CHANGE UP (ref 42.2–75.2)
NEUTROPHILS NFR BLD AUTO: 75.2 % — SIGNIFICANT CHANGE UP (ref 42.2–75.2)
NRBC # BLD: 0 /100 WBCS — SIGNIFICANT CHANGE UP (ref 0–0)
NRBC # BLD: 0 /100 WBCS — SIGNIFICANT CHANGE UP (ref 0–0)
PHOSPHATE SERPL-MCNC: 3.4 MG/DL — SIGNIFICANT CHANGE UP (ref 2.1–4.9)
PHOSPHATE SERPL-MCNC: 3.4 MG/DL — SIGNIFICANT CHANGE UP (ref 2.1–4.9)
PLATELET # BLD AUTO: 233 K/UL — SIGNIFICANT CHANGE UP (ref 130–400)
PLATELET # BLD AUTO: 284 K/UL — SIGNIFICANT CHANGE UP (ref 130–400)
POTASSIUM SERPL-MCNC: 4.4 MMOL/L — SIGNIFICANT CHANGE UP (ref 3.5–5)
POTASSIUM SERPL-MCNC: 4.5 MMOL/L — SIGNIFICANT CHANGE UP (ref 3.5–5)
POTASSIUM SERPL-SCNC: 4.4 MMOL/L — SIGNIFICANT CHANGE UP (ref 3.5–5)
POTASSIUM SERPL-SCNC: 4.5 MMOL/L — SIGNIFICANT CHANGE UP (ref 3.5–5)
PROT SERPL-MCNC: 6.4 G/DL — SIGNIFICANT CHANGE UP (ref 6–8)
PROTHROM AB SERPL-ACNC: 21.3 SEC — HIGH (ref 9.95–12.87)
RBC # BLD: 4.31 M/UL — LOW (ref 4.7–6.1)
RBC # BLD: 4.86 M/UL — SIGNIFICANT CHANGE UP (ref 4.7–6.1)
RBC # FLD: 19.5 % — HIGH (ref 11.5–14.5)
RBC # FLD: 19.5 % — HIGH (ref 11.5–14.5)
SODIUM SERPL-SCNC: 135 MMOL/L — SIGNIFICANT CHANGE UP (ref 135–146)
SODIUM SERPL-SCNC: 135 MMOL/L — SIGNIFICANT CHANGE UP (ref 135–146)
WBC # BLD: 8.58 K/UL — SIGNIFICANT CHANGE UP (ref 4.8–10.8)
WBC # BLD: 9.52 K/UL — SIGNIFICANT CHANGE UP (ref 4.8–10.8)
WBC # FLD AUTO: 8.58 K/UL — SIGNIFICANT CHANGE UP (ref 4.8–10.8)
WBC # FLD AUTO: 9.52 K/UL — SIGNIFICANT CHANGE UP (ref 4.8–10.8)

## 2020-02-03 PROCEDURE — 99233 SBSQ HOSP IP/OBS HIGH 50: CPT

## 2020-02-03 RX ORDER — PRASUGREL 5 MG/1
10 TABLET, FILM COATED ORAL DAILY
Refills: 0 | Status: DISCONTINUED | OUTPATIENT
Start: 2020-02-03 | End: 2020-02-04

## 2020-02-03 RX ADMIN — PANTOPRAZOLE SODIUM 40 MILLIGRAM(S): 20 TABLET, DELAYED RELEASE ORAL at 07:39

## 2020-02-03 RX ADMIN — PRASUGREL 10 MILLIGRAM(S): 5 TABLET, FILM COATED ORAL at 11:58

## 2020-02-03 RX ADMIN — OXYCODONE HYDROCHLORIDE 5 MILLIGRAM(S): 5 TABLET ORAL at 08:31

## 2020-02-03 RX ADMIN — ATORVASTATIN CALCIUM 40 MILLIGRAM(S): 80 TABLET, FILM COATED ORAL at 11:57

## 2020-02-03 RX ADMIN — DULOXETINE HYDROCHLORIDE 90 MILLIGRAM(S): 30 CAPSULE, DELAYED RELEASE ORAL at 11:58

## 2020-02-03 RX ADMIN — Medication 650 MILLIGRAM(S): at 17:38

## 2020-02-03 RX ADMIN — Medication 650 MILLIGRAM(S): at 00:25

## 2020-02-03 RX ADMIN — Medication 1 TABLET(S): at 17:38

## 2020-02-03 RX ADMIN — Medication 650 MILLIGRAM(S): at 06:03

## 2020-02-03 RX ADMIN — Medication 650 MILLIGRAM(S): at 05:32

## 2020-02-03 RX ADMIN — Medication 81 MILLIGRAM(S): at 11:58

## 2020-02-03 RX ADMIN — Medication 1 APPLICATION(S): at 23:09

## 2020-02-03 RX ADMIN — Medication 1 TABLET(S): at 05:33

## 2020-02-03 RX ADMIN — Medication 650 MILLIGRAM(S): at 23:09

## 2020-02-03 RX ADMIN — OXYCODONE HYDROCHLORIDE 5 MILLIGRAM(S): 5 TABLET ORAL at 09:01

## 2020-02-03 RX ADMIN — OXYCODONE HYDROCHLORIDE 5 MILLIGRAM(S): 5 TABLET ORAL at 20:00

## 2020-02-03 RX ADMIN — ENOXAPARIN SODIUM 40 MILLIGRAM(S): 100 INJECTION SUBCUTANEOUS at 11:58

## 2020-02-03 RX ADMIN — Medication 1 APPLICATION(S): at 10:12

## 2020-02-03 RX ADMIN — Medication 650 MILLIGRAM(S): at 11:58

## 2020-02-03 RX ADMIN — Medication 0.12 MILLIGRAM(S): at 05:36

## 2020-02-03 NOTE — PROGRESS NOTE ADULT - ASSESSMENT
63yo M with Past Medical History CAD status post CABG, Chronic Systolic CHF (11%), DM (on insulin pump), pulmonary hypertension, Celiac disease, diverticulitis, status post right total knee replacement October 2019 complicated by infection status post an extended course of IV antibiotics admitted status post mechanical fall complicated by right hip fracture.     Fall complicated by right hip fracture: WBAT to the RLE.  Hemoglobin has remained stable @ 9.4 following surgery.  The patient is a candidate for acute inpatient rehab.  Chronic wound to the right knee: Burn surgery recommendations were reviewed; continue local wound care with Jurgenyl.  Follow-up to schedule STSG later this week.  CAD status post CABG/Chronic Systolic CHF: off IV fluids.  Continue Metolazone 2.5mg PO q24 and Metoprolol ER 150mg PO q24h.   For prior CAD status post CABG, continue ASA and restart Effient as per ortho.  DMII: the patient manages his own diabetes.  Continue CHO consistent and monitor fingersticks with meals  GI prophylaxis    #Progress Note Handoff    Pending:  Consults: Burn Sx to schedule STSG  Tests: CBC in AM  Future Disposition: 4A

## 2020-02-03 NOTE — OCCUPATIONAL THERAPY INITIAL EVALUATION ADULT - TRANSFER SAFETY CONCERNS NOTED: BED/CHAIR, REHAB EVAL
decreased weight-shifting ability/decreased proprioception/stand pivot/decreased step length/losing balance/decreased balance during turns

## 2020-02-03 NOTE — PROGRESS NOTE ADULT - SUBJECTIVE AND OBJECTIVE BOX
Orthopaedics Progress Note    FLOWER MARISCAL  [id number]    Patient is a 64y year old Male with right hip fracture  POD#3 from right hip IMN  Patient seen and examined bedside  Pain well controlled  Had pain with PT evaluation so did not do much  No other complaints    acetaminophen   Tablet .. 650 milliGRAM(s) Oral every 6 hours  aspirin enteric coated 81 milliGRAM(s) Oral daily  atorvastatin Oral Tab/Cap - Peds 40 milliGRAM(s) Oral daily  dextrose 40% Gel 15 Gram(s) Oral once PRN  dextrose 5%. 1000 milliLiter(s) IV Continuous <Continuous>  dextrose 50% Injectable 12.5 Gram(s) IV Push once  dextrose 50% Injectable 25 Gram(s) IV Push once  dextrose 50% Injectable 25 Gram(s) IV Push once  digoxin     Tablet 0.125 milliGRAM(s) Oral daily  DULoxetine 90 milliGRAM(s) Oral daily  enoxaparin Injectable 40 milliGRAM(s) SubCutaneous daily  furosemide    Tablet 40 milliGRAM(s) Oral daily  glucagon  Injectable 1 milliGRAM(s) IntraMuscular once PRN  metolazone 2.5 milliGRAM(s) Oral daily  metoprolol succinate  milliGRAM(s) Oral daily  morphine  - Injectable 2 milliGRAM(s) IV Push every 4 hours PRN  oxyCODONE    IR 5 milliGRAM(s) Oral every 4 hours PRN  pantoprazole    Tablet 40 milliGRAM(s) Oral before breakfast  sodium chloride 0.9%. 1000 milliLiter(s) IV Continuous <Continuous>  sodium chloride 0.9%. 500 milliLiter(s) IV Continuous <Continuous>    Vital Signs Last 24 Hrs  T(C): 36.1 (03 Feb 2020 00:09), Max: 36.1 (02 Feb 2020 11:42)  T(F): 96.9 (03 Feb 2020 00:09), Max: 97 (02 Feb 2020 11:42)  HR: 71 (03 Feb 2020 05:38) (70 - 89)  BP: 103/60 (03 Feb 2020 05:38) (96/55 - 113/69)  BP(mean): --  RR: 18 (03 Feb 2020 00:09) (18 - 18)  SpO2: --    Physical Exam  NAD  Breathing comfortably on RA  Resting comfortably  RLE  Dressing removed, scant SS drainage noted  Incision line intact with staples  Dressing replaced  Motor: TA/EHL/Gastroc intact  Sensory: SP/DP/Dueñas/Sa intact  Vasc: foot WWP, 2+ DP pulse    Labs                        9.2    8.18  )-----------( 219      ( 02 Feb 2020 06:25 )             30.0     02-02    140  |  106  |  37<H>  ----------------------------<  176<H>  4.8   |  21  |  1.4    Ca    8.5      02 Feb 2020 06:25  Mg     1.7     02-01    A/P: Patient is a 64y year old Male as above    WBAT on RLE  DVT ppx: SCD, LVX  Abx: complete  Pain control  Home medications: resume  Trend labs  Dispo: Pending PT recommendations

## 2020-02-03 NOTE — OCCUPATIONAL THERAPY INITIAL EVALUATION ADULT - RANGE OF MOTION EXAMINATION, UPPER EXTREMITY
bilateral UE Active ROM was WNL (within normal limits)/colton hands arthritic changes with atropy of thenar eminence

## 2020-02-03 NOTE — OCCUPATIONAL THERAPY INITIAL EVALUATION ADULT - PLANNED THERAPY INTERVENTIONS, OT EVAL
transfer training/ROM/strengthening/bed mobility training/ADL retraining/IADL retraining/balance training

## 2020-02-03 NOTE — OCCUPATIONAL THERAPY INITIAL EVALUATION ADULT - PERTINENT HX OF CURRENT PROBLEM, REHAB EVAL
The patient is a 64 year-old male with a PMH of CAD s/p CABG, HFrEF (11%), DM (on insulin pump), pulmonary hypertension, Celiac disease, diverticulitis, s/p right total knee replacement October 2019 complicated by infection s/p abx course w/ PICC, presenting following a mechanical fall at home.

## 2020-02-03 NOTE — OCCUPATIONAL THERAPY INITIAL EVALUATION ADULT - LIVES WITH, PROFILE
pt lives in pvt house with wife with MITRA and 2 floors inside. pt was ind PTA with use of SC. pt has commode and raised toiled seat PTA/spouse

## 2020-02-03 NOTE — PROVIDER CONTACT NOTE (OTHER) - ACTION/TREATMENT ORDERED:
MD aware. Continue to monitor.
give pt juice and recheck thank you
MD aware, will place xanax for anxiety

## 2020-02-03 NOTE — PROVIDER CONTACT NOTE (MEDICATION) - ACTION/TREATMENT ORDERED:
MD Tabet notified and made aware. as per MD, O.K. to give digoxin; please hold metolazone & metoprolol.

## 2020-02-03 NOTE — PROGRESS NOTE ADULT - SUBJECTIVE AND OBJECTIVE BOX
Denies any chest pain, SOB or palpitations.       MEDICATIONS  (STANDING):  acetaminophen   Tablet .. 650 milliGRAM(s) Oral every 6 hours  amoxicillin  875 milliGRAM(s)/clavulanate 1 Tablet(s) Oral two times a day  aspirin enteric coated 81 milliGRAM(s) Oral daily  atorvastatin Oral Tab/Cap - Peds 40 milliGRAM(s) Oral daily  collagenase Ointment 1 Application(s) Topical two times a day  dextrose 5%. 1000 milliLiter(s) (50 mL/Hr) IV Continuous <Continuous>  dextrose 50% Injectable 12.5 Gram(s) IV Push once  dextrose 50% Injectable 25 Gram(s) IV Push once  dextrose 50% Injectable 25 Gram(s) IV Push once  digoxin     Tablet 0.125 milliGRAM(s) Oral daily  DULoxetine 90 milliGRAM(s) Oral daily  enoxaparin Injectable 40 milliGRAM(s) SubCutaneous daily  metolazone 2.5 milliGRAM(s) Oral daily  metoprolol succinate  milliGRAM(s) Oral daily  pantoprazole    Tablet 40 milliGRAM(s) Oral before breakfast  prasugrel 10 milliGRAM(s) Oral daily  sodium chloride 0.9%. 500 milliLiter(s) (500 mL/Hr) IV Continuous <Continuous>    MEDICATIONS  (PRN):  dextrose 40% Gel 15 Gram(s) Oral once PRN Blood Glucose LESS THAN 70 milliGRAM(s)/deciliter  glucagon  Injectable 1 milliGRAM(s) IntraMuscular once PRN Glucose LESS THAN 70 milligrams/deciliter  morphine  - Injectable 2 milliGRAM(s) IV Push every 4 hours PRN Severe Pain (7 - 10)  oxyCODONE    IR 5 milliGRAM(s) Oral every 4 hours PRN Moderate Pain (4 - 6)            Vital Signs Last 24 Hrs  T(C): 35.6 (03 Feb 2020 16:00), Max: 36.1 (03 Feb 2020 00:09)  T(F): 96 (03 Feb 2020 16:00), Max: 97 (03 Feb 2020 07:55)  HR: 80 (03 Feb 2020 16:00) (70 - 80)  BP: 108/59 (03 Feb 2020 16:00) (101/59 - 108/59)  BP(mean): --  RR: 18 (03 Feb 2020 16:00) (18 - 18)  SpO2: --     REVIEW OF SYSTEMS:  CONSTITUTIONAL: No fever, weight loss, or fatigue  CARDIOLOGY: PAtient denies chest pain, shortness of breath or syncopal episodes.   RESPIRATORY: denies shortness of breath, wheezeing.   NEUROLOGICAL: NO weakness, no focal deficits to report.  GI: no BRBPR, no N,V,diarrhea.    PSYCHIATRY: normal mood and affect  HEENT: no nasal discharge, no ecchymosis  SKIN: no ecchymosis, no breakdown  MUSCULOSKELETAL: Full range of motion x4.        PHYSICAL EXAM:  · CONSTITUTIONAL:	Well-developed, well nourished      ·RESPIRATORY:   airway patent; breath sounds equal  · CARDIOVASCULAR	regular rate and rhythm  + YULISA  · EXTREMITIES: No cyanosis, clubbing or edema  · VASCULAR: 	Absent AT/PT bilaterally   	    TTE: Severe LV dysfunction     LABS:                        10.4   9.52  )-----------( 284      ( 03 Feb 2020 20:50 )             34.3     02-03    135  |  99  |  42<H>  ----------------------------<  121<H>  4.4   |  22  |  1.3    Ca    8.5      03 Feb 2020 04:47  Phos  3.4     02-03  Mg     2.0     02-03              I&O's Summary    02 Feb 2020 07:01  -  03 Feb 2020 07:00  --------------------------------------------------------  IN: 720 mL / OUT: 400 mL / NET: 320 mL    03 Feb 2020 07:01  -  03 Feb 2020 21:18  --------------------------------------------------------  IN: 0 mL / OUT: 400 mL / NET: -400 mL      BNP  RADIOLOGY & ADDITIONAL STUDIES:    IMPRESSION AND PLAN:    Continue current care   DAPT  ID consult  EP for BiV AICD  Apply AICD pads perioperatively   Allergy/Immunology to start ACEI/ARB in view of his remote allergic reaction   Transfer to  in AM post debridement   Keep negative balance  Physical therapy/Rehab   Will F/U     Plan of care discussed with the patient. He understands  Spoke to EP Dr. Jackie hi

## 2020-02-03 NOTE — PROGRESS NOTE ADULT - SUBJECTIVE AND OBJECTIVE BOX
FLOWER MARISCAL [id number]    Hospitalist Note  63yo M with Past Medical History CAD status post CABG, Chronic Systolic CHF (11%), DM (on insulin pump), pulmonary hypertension, Celiac disease, diverticulitis, status post right total knee replacement October 2019 complicated by infection status post an extended course of IV antibiotics admitted status post mechanical fall complicated by right hip fracture.  Status post ORIF.    Overnight events/Updates: Orthopedics agreed to restarting DAPT (ASA and Effient).  His hemoglobin has remained stable at 9.4.  Burn Sx was consulted for chronic open wound adjacent to the right knee.    Vital Signs Last 24 Hrs  T(C): 35.6 (03 Feb 2020 16:00), Max: 36.1 (03 Feb 2020 00:09)  T(F): 96 (03 Feb 2020 16:00), Max: 97 (03 Feb 2020 07:55)  HR: 80 (03 Feb 2020 16:00) (70 - 80)  BP: 108/59 (03 Feb 2020 16:00) (101/59 - 108/59)  BP(mean): --  RR: 18 (03 Feb 2020 16:00) (18 - 18)  SpO2: --    Physical Examination:  General: AAO x 3  HEENT: PERRLA, EOMI  CV= S1 & S2 appreciated  Lungs= CTA BL  Abdominal Examination= + BS, Soft, NT/ND  Extremity Examination= dressing to the right knee    ROS: No chest pain, no shortness of breath.  All other systems reviewed and are within normal limits except for the complaints in the HPI.    MEDICATIONS  (STANDING):  acetaminophen   Tablet .. 650 milliGRAM(s) Oral every 6 hours  amoxicillin  875 milliGRAM(s)/clavulanate 1 Tablet(s) Oral two times a day  aspirin enteric coated 81 milliGRAM(s) Oral daily  atorvastatin Oral Tab/Cap - Peds 40 milliGRAM(s) Oral daily  collagenase Ointment 1 Application(s) Topical two times a day  dextrose 5%. 1000 milliLiter(s) (50 mL/Hr) IV Continuous <Continuous>  dextrose 50% Injectable 12.5 Gram(s) IV Push once  dextrose 50% Injectable 25 Gram(s) IV Push once  dextrose 50% Injectable 25 Gram(s) IV Push once  digoxin     Tablet 0.125 milliGRAM(s) Oral daily  DULoxetine 90 milliGRAM(s) Oral daily  enoxaparin Injectable 40 milliGRAM(s) SubCutaneous daily  metolazone 2.5 milliGRAM(s) Oral daily  metoprolol succinate  milliGRAM(s) Oral daily  pantoprazole    Tablet 40 milliGRAM(s) Oral before breakfast  prasugrel 10 milliGRAM(s) Oral daily  sodium chloride 0.9%. 1000 milliLiter(s) (75 mL/Hr) IV Continuous <Continuous>    MEDICATIONS  (PRN):  dextrose 40% Gel 15 Gram(s) Oral once PRN Blood Glucose LESS THAN 70 milliGRAM(s)/deciliter  glucagon  Injectable 1 milliGRAM(s) IntraMuscular once PRN Glucose LESS THAN 70 milligrams/deciliter  morphine  - Injectable 2 milliGRAM(s) IV Push every 4 hours PRN Severe Pain (7 - 10)  oxyCODONE    IR 5 milliGRAM(s) Oral every 4 hours PRN Moderate Pain (4 - 6)                        9.4    8.58  )-----------( 233      ( 03 Feb 2020 04:47 )             30.8     02-03    135  |  99  |  42<H>  ----------------------------<  121<H>  4.4   |  22  |  1.3    Ca    8.5      03 Feb 2020 04:47  Phos  3.4     02-03  Mg     2.0     02-03        Case discussed with housestaff & family  NU Liu [id number]

## 2020-02-03 NOTE — OCCUPATIONAL THERAPY INITIAL EVALUATION ADULT - GENERAL OBSERVATIONS, REHAB EVAL
pt received semi richey in bed in NAD, +colton sequentials, +IV lock, +insulin pump agreeable to OT session, pt left seated in b/s chair +alarm, RN aware

## 2020-02-04 ENCOUNTER — RESULT REVIEW (OUTPATIENT)
Age: 65
End: 2020-02-04

## 2020-02-04 LAB
ALBUMIN SERPL ELPH-MCNC: 3 G/DL — LOW (ref 3.5–5.2)
ALP SERPL-CCNC: 86 U/L — SIGNIFICANT CHANGE UP (ref 30–115)
ALT FLD-CCNC: 14 U/L — SIGNIFICANT CHANGE UP (ref 0–41)
ANION GAP SERPL CALC-SCNC: 11 MMOL/L — SIGNIFICANT CHANGE UP (ref 7–14)
APPEARANCE UR: CLEAR — SIGNIFICANT CHANGE UP
AST SERPL-CCNC: 25 U/L — SIGNIFICANT CHANGE UP (ref 0–41)
BASOPHILS # BLD AUTO: 0.05 K/UL — SIGNIFICANT CHANGE UP (ref 0–0.2)
BASOPHILS NFR BLD AUTO: 0.6 % — SIGNIFICANT CHANGE UP (ref 0–1)
BILIRUB SERPL-MCNC: 1.3 MG/DL — HIGH (ref 0.2–1.2)
BILIRUB UR-MCNC: NEGATIVE — SIGNIFICANT CHANGE UP
BUN SERPL-MCNC: 34 MG/DL — HIGH (ref 10–20)
CALCIUM SERPL-MCNC: 8.5 MG/DL — SIGNIFICANT CHANGE UP (ref 8.5–10.1)
CHLORIDE SERPL-SCNC: 98 MMOL/L — SIGNIFICANT CHANGE UP (ref 98–110)
CO2 SERPL-SCNC: 27 MMOL/L — SIGNIFICANT CHANGE UP (ref 17–32)
COLOR SPEC: YELLOW — SIGNIFICANT CHANGE UP
CREAT SERPL-MCNC: 1 MG/DL — SIGNIFICANT CHANGE UP (ref 0.7–1.5)
DIFF PNL FLD: NEGATIVE — SIGNIFICANT CHANGE UP
EOSINOPHIL # BLD AUTO: 0.27 K/UL — SIGNIFICANT CHANGE UP (ref 0–0.7)
EOSINOPHIL NFR BLD AUTO: 3.2 % — SIGNIFICANT CHANGE UP (ref 0–8)
GLUCOSE BLDC GLUCOMTR-MCNC: 103 MG/DL — HIGH (ref 70–99)
GLUCOSE BLDC GLUCOMTR-MCNC: 149 MG/DL — HIGH (ref 70–99)
GLUCOSE BLDC GLUCOMTR-MCNC: 184 MG/DL — HIGH (ref 70–99)
GLUCOSE BLDC GLUCOMTR-MCNC: 85 MG/DL — SIGNIFICANT CHANGE UP (ref 70–99)
GLUCOSE SERPL-MCNC: 101 MG/DL — HIGH (ref 70–99)
GLUCOSE UR QL: NEGATIVE — SIGNIFICANT CHANGE UP
HCT VFR BLD CALC: 31.7 % — LOW (ref 42–52)
HGB BLD-MCNC: 9.7 G/DL — LOW (ref 14–18)
IMM GRANULOCYTES NFR BLD AUTO: 0.2 % — SIGNIFICANT CHANGE UP (ref 0.1–0.3)
KETONES UR-MCNC: NEGATIVE — SIGNIFICANT CHANGE UP
LEUKOCYTE ESTERASE UR-ACNC: NEGATIVE — SIGNIFICANT CHANGE UP
LYMPHOCYTES # BLD AUTO: 1 K/UL — LOW (ref 1.2–3.4)
LYMPHOCYTES # BLD AUTO: 11.9 % — LOW (ref 20.5–51.1)
MCHC RBC-ENTMCNC: 21.5 PG — LOW (ref 27–31)
MCHC RBC-ENTMCNC: 30.6 G/DL — LOW (ref 32–37)
MCV RBC AUTO: 70.1 FL — LOW (ref 80–94)
MONOCYTES # BLD AUTO: 1.19 K/UL — HIGH (ref 0.1–0.6)
MONOCYTES NFR BLD AUTO: 14.2 % — HIGH (ref 1.7–9.3)
NEUTROPHILS # BLD AUTO: 5.86 K/UL — SIGNIFICANT CHANGE UP (ref 1.4–6.5)
NEUTROPHILS NFR BLD AUTO: 69.9 % — SIGNIFICANT CHANGE UP (ref 42.2–75.2)
NITRITE UR-MCNC: NEGATIVE — SIGNIFICANT CHANGE UP
NRBC # BLD: 0 /100 WBCS — SIGNIFICANT CHANGE UP (ref 0–0)
PH UR: 6 — SIGNIFICANT CHANGE UP (ref 5–8)
PLATELET # BLD AUTO: 254 K/UL — SIGNIFICANT CHANGE UP (ref 130–400)
POTASSIUM SERPL-MCNC: 3.9 MMOL/L — SIGNIFICANT CHANGE UP (ref 3.5–5)
POTASSIUM SERPL-SCNC: 3.9 MMOL/L — SIGNIFICANT CHANGE UP (ref 3.5–5)
PROT SERPL-MCNC: 5.6 G/DL — LOW (ref 6–8)
PROT UR-MCNC: SIGNIFICANT CHANGE UP
RBC # BLD: 4.52 M/UL — LOW (ref 4.7–6.1)
RBC # FLD: 19.7 % — HIGH (ref 11.5–14.5)
SODIUM SERPL-SCNC: 136 MMOL/L — SIGNIFICANT CHANGE UP (ref 135–146)
SP GR SPEC: 1.02 — SIGNIFICANT CHANGE UP (ref 1.01–1.02)
UROBILINOGEN FLD QL: SIGNIFICANT CHANGE UP
WBC # BLD: 8.39 K/UL — SIGNIFICANT CHANGE UP (ref 4.8–10.8)
WBC # FLD AUTO: 8.39 K/UL — SIGNIFICANT CHANGE UP (ref 4.8–10.8)

## 2020-02-04 PROCEDURE — 15100 SPLT AGRFT T/A/L 1ST 100SQCM: CPT

## 2020-02-04 PROCEDURE — 88304 TISSUE EXAM BY PATHOLOGIST: CPT | Mod: 26

## 2020-02-04 PROCEDURE — 99223 1ST HOSP IP/OBS HIGH 75: CPT

## 2020-02-04 PROCEDURE — 97606 NEG PRS WND THER DME>50 SQCM: CPT | Mod: 59

## 2020-02-04 PROCEDURE — 15002 WOUND PREP TRK/ARM/LEG: CPT

## 2020-02-04 PROCEDURE — 99233 SBSQ HOSP IP/OBS HIGH 50: CPT

## 2020-02-04 RX ORDER — MAGNESIUM SULFATE 500 MG/ML
2 VIAL (ML) INJECTION ONCE
Refills: 0 | Status: DISCONTINUED | OUTPATIENT
Start: 2020-02-04 | End: 2020-02-04

## 2020-02-04 RX ORDER — DEXTROSE 50 % IN WATER 50 %
15 SYRINGE (ML) INTRAVENOUS ONCE
Refills: 0 | Status: DISCONTINUED | OUTPATIENT
Start: 2020-02-04 | End: 2020-02-11

## 2020-02-04 RX ORDER — HYDROMORPHONE HYDROCHLORIDE 2 MG/ML
0.5 INJECTION INTRAMUSCULAR; INTRAVENOUS; SUBCUTANEOUS
Refills: 0 | Status: DISCONTINUED | OUTPATIENT
Start: 2020-02-04 | End: 2020-02-04

## 2020-02-04 RX ORDER — METOPROLOL TARTRATE 50 MG
150 TABLET ORAL DAILY
Refills: 0 | Status: DISCONTINUED | OUTPATIENT
Start: 2020-02-04 | End: 2020-02-11

## 2020-02-04 RX ORDER — ENOXAPARIN SODIUM 100 MG/ML
40 INJECTION SUBCUTANEOUS DAILY
Refills: 0 | Status: DISCONTINUED | OUTPATIENT
Start: 2020-02-04 | End: 2020-02-11

## 2020-02-04 RX ORDER — OXYCODONE AND ACETAMINOPHEN 5; 325 MG/1; MG/1
2 TABLET ORAL ONCE
Refills: 0 | Status: DISCONTINUED | OUTPATIENT
Start: 2020-02-04 | End: 2020-02-04

## 2020-02-04 RX ORDER — HYDROMORPHONE HYDROCHLORIDE 2 MG/ML
1 INJECTION INTRAMUSCULAR; INTRAVENOUS; SUBCUTANEOUS
Refills: 0 | Status: DISCONTINUED | OUTPATIENT
Start: 2020-02-04 | End: 2020-02-04

## 2020-02-04 RX ORDER — SODIUM CHLORIDE 9 MG/ML
1000 INJECTION, SOLUTION INTRAVENOUS
Refills: 0 | Status: DISCONTINUED | OUTPATIENT
Start: 2020-02-04 | End: 2020-02-11

## 2020-02-04 RX ORDER — PANTOPRAZOLE SODIUM 20 MG/1
40 TABLET, DELAYED RELEASE ORAL
Refills: 0 | Status: DISCONTINUED | OUTPATIENT
Start: 2020-02-04 | End: 2020-02-11

## 2020-02-04 RX ORDER — DEXTROSE 50 % IN WATER 50 %
25 SYRINGE (ML) INTRAVENOUS ONCE
Refills: 0 | Status: DISCONTINUED | OUTPATIENT
Start: 2020-02-04 | End: 2020-02-11

## 2020-02-04 RX ORDER — SODIUM CHLORIDE 9 MG/ML
1000 INJECTION, SOLUTION INTRAVENOUS
Refills: 0 | Status: DISCONTINUED | OUTPATIENT
Start: 2020-02-04 | End: 2020-02-04

## 2020-02-04 RX ORDER — ONDANSETRON 8 MG/1
4 TABLET, FILM COATED ORAL ONCE
Refills: 0 | Status: DISCONTINUED | OUTPATIENT
Start: 2020-02-04 | End: 2020-02-04

## 2020-02-04 RX ORDER — ACETAMINOPHEN 500 MG
650 TABLET ORAL EVERY 6 HOURS
Refills: 0 | Status: DISCONTINUED | OUTPATIENT
Start: 2020-02-04 | End: 2020-02-11

## 2020-02-04 RX ORDER — MORPHINE SULFATE 50 MG/1
2 CAPSULE, EXTENDED RELEASE ORAL EVERY 4 HOURS
Refills: 0 | Status: DISCONTINUED | OUTPATIENT
Start: 2020-02-04 | End: 2020-02-05

## 2020-02-04 RX ORDER — PRASUGREL 5 MG/1
10 TABLET, FILM COATED ORAL DAILY
Refills: 0 | Status: DISCONTINUED | OUTPATIENT
Start: 2020-02-05 | End: 2020-02-11

## 2020-02-04 RX ORDER — ASPIRIN/CALCIUM CARB/MAGNESIUM 324 MG
81 TABLET ORAL DAILY
Refills: 0 | Status: DISCONTINUED | OUTPATIENT
Start: 2020-02-04 | End: 2020-02-11

## 2020-02-04 RX ORDER — GLUCAGON INJECTION, SOLUTION 0.5 MG/.1ML
1 INJECTION, SOLUTION SUBCUTANEOUS ONCE
Refills: 0 | Status: DISCONTINUED | OUTPATIENT
Start: 2020-02-04 | End: 2020-02-11

## 2020-02-04 RX ORDER — ATORVASTATIN CALCIUM 80 MG/1
40 TABLET, FILM COATED ORAL DAILY
Refills: 0 | Status: DISCONTINUED | OUTPATIENT
Start: 2020-02-04 | End: 2020-02-07

## 2020-02-04 RX ORDER — OXYCODONE HYDROCHLORIDE 5 MG/1
5 TABLET ORAL EVERY 4 HOURS
Refills: 0 | Status: DISCONTINUED | OUTPATIENT
Start: 2020-02-04 | End: 2020-02-07

## 2020-02-04 RX ORDER — DIGOXIN 250 MCG
0.12 TABLET ORAL DAILY
Refills: 0 | Status: DISCONTINUED | OUTPATIENT
Start: 2020-02-04 | End: 2020-02-11

## 2020-02-04 RX ORDER — DEXTROSE 50 % IN WATER 50 %
12.5 SYRINGE (ML) INTRAVENOUS ONCE
Refills: 0 | Status: DISCONTINUED | OUTPATIENT
Start: 2020-02-04 | End: 2020-02-11

## 2020-02-04 RX ORDER — DULOXETINE HYDROCHLORIDE 30 MG/1
90 CAPSULE, DELAYED RELEASE ORAL DAILY
Refills: 0 | Status: DISCONTINUED | OUTPATIENT
Start: 2020-02-04 | End: 2020-02-11

## 2020-02-04 RX ADMIN — Medication 81 MILLIGRAM(S): at 11:36

## 2020-02-04 RX ADMIN — Medication 150 MILLIGRAM(S): at 07:31

## 2020-02-04 RX ADMIN — ENOXAPARIN SODIUM 40 MILLIGRAM(S): 100 INJECTION SUBCUTANEOUS at 11:37

## 2020-02-04 RX ADMIN — DULOXETINE HYDROCHLORIDE 90 MILLIGRAM(S): 30 CAPSULE, DELAYED RELEASE ORAL at 11:36

## 2020-02-04 RX ADMIN — Medication 1 APPLICATION(S): at 11:38

## 2020-02-04 RX ADMIN — Medication 650 MILLIGRAM(S): at 11:35

## 2020-02-04 RX ADMIN — Medication 1 TABLET(S): at 20:41

## 2020-02-04 RX ADMIN — ATORVASTATIN CALCIUM 40 MILLIGRAM(S): 80 TABLET, FILM COATED ORAL at 11:37

## 2020-02-04 RX ADMIN — MORPHINE SULFATE 2 MILLIGRAM(S): 50 CAPSULE, EXTENDED RELEASE ORAL at 08:04

## 2020-02-04 RX ADMIN — Medication 650 MILLIGRAM(S): at 07:28

## 2020-02-04 RX ADMIN — PRASUGREL 10 MILLIGRAM(S): 5 TABLET, FILM COATED ORAL at 11:37

## 2020-02-04 RX ADMIN — Medication 1 TABLET(S): at 07:29

## 2020-02-04 RX ADMIN — Medication 650 MILLIGRAM(S): at 19:08

## 2020-02-04 RX ADMIN — PANTOPRAZOLE SODIUM 40 MILLIGRAM(S): 20 TABLET, DELAYED RELEASE ORAL at 07:31

## 2020-02-04 RX ADMIN — SODIUM CHLORIDE 100 MILLILITER(S): 9 INJECTION, SOLUTION INTRAVENOUS at 17:15

## 2020-02-04 RX ADMIN — Medication 0.12 MILLIGRAM(S): at 07:30

## 2020-02-04 RX ADMIN — Medication 650 MILLIGRAM(S): at 20:00

## 2020-02-04 NOTE — PROGRESS NOTE ADULT - ASSESSMENT
s/p right hip orif pod 4    pain control  dvt proph   monitor drainage   fu labs   rehab   dispo   fu burn for right knee wound management

## 2020-02-04 NOTE — CONSULT NOTE ADULT - SUBJECTIVE AND OBJECTIVE BOX
Patient is a 64y old  Male who presents with a chief complaint of intertrochanteric fracture of right hip (2020 10:42)    HPI: The patient is a 64 year-old male with a PMH of CAD s/p CABG, HFrEF (11%), DM (on insulin pump), pulmonary hypertension, Celiac disease, diverticulitis, s/p right total knee replacement 2019 complicated by infection s/p abx course w/ PICC, presenting following a mechanical fall. He reports that over the last several days he has been feeling more tired; last night he felt especially tired and sat down in the chair. After then getting up to go to the kitchen, he "fell asleep standing," landing on his right buttock. He reports he had been asleep for about 20 seconds. He denies any associated loss of bladder or bowel continence, dizziness or lightheadedness, nausea or vomiting prior to or after the episode. His wife then called EMS and he presented to the ED. On presentation, vitals were T 97.8 F, HR 97, /78; trops 0.03. Right hip xray revealed an intertrochanteric fracture of the right hip, with varus angulation of the distal fragment, subtrochanteric extension with reverse obliquity and avulsion of the lesser trochanter. On admission the patient was resting comfortably; denied any chest pain, shortness of breath, dizziness, confusion, subjective fever, chills, nausea, vomiting, diarrhea. He lives w/ his wife at home; ambulates w/ a cane at baseline.    EP consulted as pt found to have low EF in December on echo and EF was 25% on MUGA. Plan at that time was for BiV ICD to be scheduled. He was sent home with lifevest but admits to never wearing it. Now needs BiV ICD. Denies CP, palpitations or syncope.    PAST MEDICAL & SURGICAL HISTORY:  Diabetic neuropathy  STEMI (ST elevation myocardial infarction)  Diverticulitis  MRSA bacteremia  History of celiac disease  CHF (congestive heart failure): EF ~ 25%  HTN (hypertension)  Diabetes: on insulin pump  Blood clot due to device, implant, or graft: was on blood thinners  HLD (hyperlipidemia)  Osteoarthritis  Atherosclerosis of coronary artery: CAD (coronary artery disease)  Status post percutaneous transluminal coronary angioplasty: in   Surgery, elective: Right shoulder  Surgery, elective: right knee wound debridement  S/P TKR (total knee replacement), right: with infection Mrsa   per pt he was cleared from MRSA infection  S/P CABG x 1:   Stented coronary artery: 10/18 heart attack  INFERIOR WALL MI  Other postprocedural status: Fixation hardware in foot LEFT      PREVIOUS DIAGNOSTIC TESTING:      ECHO  FINDINGS:  Transthoracic Echocardiogram (19 @ 10:55)     Summary:   1. Severely decreased global left ventricular systolic function.   2. Multiple left ventricular regional wall motion abnormalities exist.   See wall motion findings.   3. Elevated left atrial and left ventricular end-diastolic pressures.   4. Spectral Doppler shows restrictive pattern of left ventricular   myocardial filling (Grade III diastolic dysfunction).   5. The LVEF by triplane Wilkerson's technique is 11%. The mean global   longitudinal peak strain by speckle tracking is -5.6% which is markedly   reduced.   6. Mild mitral valve regurgitation.   7. Mild-moderate tricuspid regurgitation.   8. Estimated pulmonary artery systolic pressure is 78.6 mmHg assuming a   right atrial pressure of 15 mmHg, which is consistent with severe   pulmonary hypertension.   9. Pulmonary hypertension is present.  10. LA volume Index is 56.9 ml/m² ml/m2.    STRESS  FINDINGS:   Pharm Thallium Stress (Lexiscan) -LEXMIB (17 @ 12:37)  Abnormal myocardial perfusion images consistent with scarring in the   anterior and anterolateral walls as well as mild stress-induced ischemia   at the margins of the defect. Overall left ventricular systolic function   is abnormal with global hypokinesis. Compared to the prior study from   2010 the myocardial perfusion images are similar but the   ejection fraction has decreased. The EKG stress test is normal.    CATHETERIZATION  FINDINGS:  Cardiac Cath Lab - Adult (18 @ 10:29)  IMPRESSIONS: There is significant single vessel native coronary artery    disease. Patent LIMA to LAD. No significant restenosis in RCA/OM1.    ELECTROPHYSIOLOGY STUDY  FINDINGS:    CAROTID ULTRASOUND:  FINDINGS    VENOUS DUPLEX SCAN:  FINDINGS:    CHEST CT PULMONARY ANGIO with IV Contrast:  FINDINGS:    MEDICATIONS  (STANDING):  acetaminophen   Tablet .. 650 milliGRAM(s) Oral every 6 hours  amoxicillin  875 milliGRAM(s)/clavulanate 1 Tablet(s) Oral two times a day  aspirin enteric coated 81 milliGRAM(s) Oral daily  atorvastatin Oral Tab/Cap - Peds 40 milliGRAM(s) Oral daily  collagenase Ointment 1 Application(s) Topical two times a day  dextrose 5%. 1000 milliLiter(s) (50 mL/Hr) IV Continuous <Continuous>  dextrose 50% Injectable 12.5 Gram(s) IV Push once  dextrose 50% Injectable 25 Gram(s) IV Push once  dextrose 50% Injectable 25 Gram(s) IV Push once  digoxin     Tablet 0.125 milliGRAM(s) Oral daily  DULoxetine 90 milliGRAM(s) Oral daily  enoxaparin Injectable 40 milliGRAM(s) SubCutaneous daily  magnesium sulfate  IVPB 2 Gram(s) IV Intermittent once  metolazone 2.5 milliGRAM(s) Oral daily  metoprolol succinate  milliGRAM(s) Oral daily  pantoprazole    Tablet 40 milliGRAM(s) Oral before breakfast  prasugrel 10 milliGRAM(s) Oral daily  sodium chloride 0.9%. 500 milliLiter(s) (500 mL/Hr) IV Continuous <Continuous>    MEDICATIONS  (PRN):  dextrose 40% Gel 15 Gram(s) Oral once PRN Blood Glucose LESS THAN 70 milliGRAM(s)/deciliter  glucagon  Injectable 1 milliGRAM(s) IntraMuscular once PRN Glucose LESS THAN 70 milligrams/deciliter  morphine  - Injectable 2 milliGRAM(s) IV Push every 4 hours PRN Severe Pain (7 - 10)  oxyCODONE    IR 5 milliGRAM(s) Oral every 4 hours PRN Moderate Pain (4 - 6)      FAMILY HISTORY:  Family history of allergies: daughter  Family history of heart disease (Mother)      SOCIAL HISTORY:    CIGARETTES: No    ALCOHOL: No    Past Surgical History:  Other postprocedural status Fixation hardware in foot LEFT    S/P CABG x 1 2018    S/P TKR (total knee replacement), right with infection Mrsa   per pt he was cleared from MRSA infection    Stented coronary artery 10/18 heart attack  INFERIOR WALL MI    Surgery, elective right knee wound debridement    Surgery, elective Right shoulder.       Allergies:  ACE inhibitors (Hives)  enalapril (Hives)  rifampin (Angioedema)  vancomycin (Angioedema)      REVIEW OF SYSTEMS:  CONSTITUTIONAL: No fever, weight loss, chills, shakes, or fatigue  RESPIRATORY: No cough, wheezing, hemoptysis, or shortness of breath  CARDIOVASCULAR: No chest pain, dyspnea, palpitations, dizziness, syncope, paroxysmal nocturnal dyspnea, orthopnea, or arm or leg swelling  GASTROINTESTINAL: No abdominal  or epigastric pain, nausea, vomiting, hematemesis, diarrhea, constipation, melena or bright red blood.  GENITOURINARY: +Dysuria and frequency  NEUROLOGICAL: No headaches, memory loss, slurred speech, limb weakness, loss of strength, numbness, or tremors  MUSCULOSKELETAL: No joint pain or swelling, muscle, back, or extremity pain      Vital Signs Last 24 Hrs  T(C): 36.4 (2020 11:20), Max: 36.7 (2020 00:00)  T(F): 97.5 (2020 11:20), Max: 98.1 (2020 00:00)  HR: 79 (2020 11:20) (79 - 89)  BP: 120/66 (2020 11:20) (106/62 - 136/61)  BP(mean): --  RR: 18 (2020 11:20) (16 - 18)  SpO2: --    PHYSICAL EXAM:  GENERAL: In no apparent distress, well nourished, and hydrated.  HEAD:  Atraumatic, Normocephalic  EYES: EOMI, PERRLA, conjunctiva and sclera clear  ENMT: Moist mucous membranes  NECK: Supple and normal thyroid.  No JVD  HEART: Regular rate and rhythm; No murmurs, rubs, or gallops.  PULMONARY: Clear to auscultation and perfusion.  No rales, wheezing, or rhonchi bilaterally.  ABDOMEN: Soft, Nontender, Nondistended; Bowel sounds present  EXTREMITIES:  2+ Peripheral Pulses, No clubbing, cyanosis, or edema  NEUROLOGICAL: Grossly nonfocal      INTERPRETATION OF TELEMETRY: Not currently on tele    EC Lead ECG (20 @ 02:39)  Ventricular Rate 94 BPM    Atrial Rate 94 BPM    P-R Interval 192 ms    QRS Duration 148 ms    Q-T Interval 390 ms    QTC Calculation(Bezet) 487 ms    P Axis 55 degrees    R Axis -55 degrees    T Axis 112 degrees    Diagnosis Line Sinus rhythm withPremature atrial complexes with Aberrant conduction  Left axis deviation  Left bundle branch block  Abnormal ECG    Confirmed by Ze Beebe ([id number]) on 2020 5:50:22 AM    I&O's Detail    2020 07:01  -  2020 07:00  --------------------------------------------------------  IN:  Total IN: 0 mL    OUT:    Voided: 1200 mL  Total OUT: 1200 mL    Total NET: -1200 mL      2020 07:01  -  2020 13:45  --------------------------------------------------------  IN:  Total IN: 0 mL    OUT:    Voided: 700 mL  Total OUT: 700 mL    Total NET: -700 mL          LABS:                        9.7    8.39  )-----------( 254      ( 2020 05:52 )             31.7     02-04    136  |  98  |  34<H>  ----------------------------<  101<H>  3.9   |  27  |  1.0    Ca    8.5      2020 05:52  Phos  3.4     02-03  Mg     1.8     02-03    TPro  5.6<L>  /  Alb  3.0<L>  /  TBili  1.3<H>  /  DBili  x   /  AST  25  /  ALT  14  /  AlkPhos  86  02-04        PT/INR - ( 2020 20:50 )   PT: 21.30 sec;   INR: 1.85 ratio         PTT - ( 2020 20:50 )  PTT:25.0 sec    BNP  I&O's Detail    2020 07:01  -  2020 07:00  --------------------------------------------------------  IN:  Total IN: 0 mL    OUT:    Voided: 1200 mL  Total OUT: 1200 mL    Total NET: -1200 mL      2020 07:01  -  2020 13:45  --------------------------------------------------------  IN:  Total IN: 0 mL    OUT:    Voided: 700 mL  Total OUT: 700 mL    Total NET: -700 mL        Daily     Daily     RADIOLOGY & ADDITIONAL STUDIES:     NM MUGA Scan (19 @ 15:52)  Impression:  1. Left ventricular ejection fraction of  26%    which is  low. Wall   motion analysis suggests ejection fraction of 20-25%  2. Dilated left ventricle with global hypokinesis  3. Dilated right ventricle with global hypokinesis

## 2020-02-04 NOTE — CONSULT NOTE ADULT - SUBJECTIVE AND OBJECTIVE BOX
Patient is a 64y old  Male who presents with a chief complaint of intertrochanteric fracture of right hip (04 Feb 2020 08:15)      HPI:  The patient is a 64 year-old male with a PMH of CAD s/p CABG, HFrEF (11%), DM (on insulin pump), pulmonary hypertension, Celiac disease, diverticulitis, s/p right total knee replacement October 2019 complicated by infection s/p abx course w/ PICC, presenting following a mechanical fall. He reports that over the last several days he has been feeling more tired; last night he felt especially tired and sat down in the chair. After then getting up to go to the kitchen, he "fell asleep standing," landing on his right buttock. He reports he had been asleep for about 20 seconds. He denies any associated loss of bladder or bowel continence, dizziness or lightheadedness, nausea or vomiting prior to or after the episode. His wife then called EMS and he presented to the ED. On presentation, vitals were T 97.8 F, HR 97, /78; trops 0.03. Right hip xray revealed an intertrochanteric fracture of the right hip, with varus angulation of the distal fragment, subtrochanteric extension with reverse obliquity and avulsion of the lesser trochanter. On admission the patient was resting comfortably; denied any chest pain, shortness of breath, dizziness, confusion, subjective fever, chills, nausea, vomiting, diarrhea. He lives w/ his wife at home; ambulates w/ a cane at baseline. (31 Jan 2020 08:40)    Allergy History:  Patient reports that back in 2005, he developed a severe skin rash (mostly pruritic) after ingesting Enalapril. After that, ACE-Is were listed as an allergy. Patient then reports that about 2 years ago, he was given Entresto and at the time he broke out into hives specifically on his hands. However,        ROS:  Negative except for:    PAST MEDICAL & SURGICAL HISTORY:  Diabetic neuropathy  STEMI (ST elevation myocardial infarction)  Diverticulitis  MRSA bacteremia  History of celiac disease  CHF (congestive heart failure): EF ~ 25%  HTN (hypertension)  Diabetes: on insulin pump  Blood clot due to device, implant, or graft: was on blood thinners  HLD (hyperlipidemia)  Osteoarthritis  Atherosclerosis of coronary artery: CAD (coronary artery disease)  Status post percutaneous transluminal coronary angioplasty: in 2012  Surgery, elective: Right shoulder  Surgery, elective: right knee wound debridement  S/P TKR (total knee replacement), right: with infection Mrsa   per pt he was cleared from MRSA infection  S/P CABG x 1: 2018  Stented coronary artery: 10/18 heart attack  INFERIOR WALL MI  Other postprocedural status: Fixation hardware in foot LEFT      SOCIAL HISTORY:    FAMILY HISTORY:  Family history of allergies: daughter  Family history of heart disease (Mother)      MEDICATIONS  (STANDING):  acetaminophen   Tablet .. 650 milliGRAM(s) Oral every 6 hours  amoxicillin  875 milliGRAM(s)/clavulanate 1 Tablet(s) Oral two times a day  aspirin enteric coated 81 milliGRAM(s) Oral daily  atorvastatin Oral Tab/Cap - Peds 40 milliGRAM(s) Oral daily  collagenase Ointment 1 Application(s) Topical two times a day  dextrose 5%. 1000 milliLiter(s) (50 mL/Hr) IV Continuous <Continuous>  dextrose 50% Injectable 12.5 Gram(s) IV Push once  dextrose 50% Injectable 25 Gram(s) IV Push once  dextrose 50% Injectable 25 Gram(s) IV Push once  digoxin     Tablet 0.125 milliGRAM(s) Oral daily  DULoxetine 90 milliGRAM(s) Oral daily  enoxaparin Injectable 40 milliGRAM(s) SubCutaneous daily  magnesium sulfate  IVPB 2 Gram(s) IV Intermittent once  metolazone 2.5 milliGRAM(s) Oral daily  metoprolol succinate  milliGRAM(s) Oral daily  pantoprazole    Tablet 40 milliGRAM(s) Oral before breakfast  prasugrel 10 milliGRAM(s) Oral daily  sodium chloride 0.9%. 500 milliLiter(s) (500 mL/Hr) IV Continuous <Continuous>    MEDICATIONS  (PRN):  dextrose 40% Gel 15 Gram(s) Oral once PRN Blood Glucose LESS THAN 70 milliGRAM(s)/deciliter  glucagon  Injectable 1 milliGRAM(s) IntraMuscular once PRN Glucose LESS THAN 70 milligrams/deciliter  morphine  - Injectable 2 milliGRAM(s) IV Push every 4 hours PRN Severe Pain (7 - 10)  oxyCODONE    IR 5 milliGRAM(s) Oral every 4 hours PRN Moderate Pain (4 - 6)      Allergies    ACE inhibitors (Hives)  enalapril (Hives)  rifampin (Angioedema)  vancomycin (Angioedema)    Intolerances        Vital Signs Last 24 Hrs  T(C): 36.1 (04 Feb 2020 07:44), Max: 36.7 (04 Feb 2020 00:00)  T(F): 96.9 (04 Feb 2020 07:44), Max: 98.1 (04 Feb 2020 00:00)  HR: 89 (04 Feb 2020 07:44) (80 - 89)  BP: 136/61 (04 Feb 2020 07:44) (106/62 - 136/61)  BP(mean): --  RR: 18 (04 Feb 2020 07:44) (16 - 18)  SpO2: --    PHYSICAL EXAM  General: adult in NAD  HEENT: clear oropharynx, anicteric sclera, pink conjunctiva  Neck: supple  CV: normal S1/S2 with no murmur rubs or gallops  Lungs: positive air movement b/l ant lungs,clear to auscultation, no wheezes, no rales  Abdomen: soft non-tender non-distended, no hepatosplenomegaly  Ext: no clubbing cyanosis or edema  Skin: no rashes and no petechiae  Neuro: alert and oriented X 4, no focal deficits      LABS:                          9.7    8.39  )-----------( 254      ( 04 Feb 2020 05:52 )             31.7         Mean Cell Volume : 70.1 fL  Mean Cell Hemoglobin : 21.5 pg  Mean Cell Hemoglobin Concentration : 30.6 g/dL  Auto Neutrophil # : 5.86 K/uL  Auto Lymphocyte # : 1.00 K/uL  Auto Monocyte # : 1.19 K/uL  Auto Eosinophil # : 0.27 K/uL  Auto Basophil # : 0.05 K/uL  Auto Neutrophil % : 69.9 %  Auto Lymphocyte % : 11.9 %  Auto Monocyte % : 14.2 %  Auto Eosinophil % : 3.2 %  Auto Basophil % : 0.6 %      Serial CBC's  02-04 @ 05:52  Hct-31.7 / Hgb-9.7 / Plat-254 / RBC-4.52 / WBC-8.39  Serial CBC's  02-03 @ 20:50  Hct-34.3 / Hgb-10.4 / Plat-284 / RBC-4.86 / WBC-9.52  Serial CBC's  02-03 @ 04:47  Hct-30.8 / Hgb-9.4 / Plat-233 / RBC-4.31 / WBC-8.58  Serial CBC's  02-02 @ 06:25  Hct-30.0 / Hgb-9.2 / Plat-219 / RBC-4.10 / WBC-8.18  Serial CBC's  02-01 @ 04:30  Hct-30.1 / Hgb-9.0 / Plat-235 / RBC-4.07 / WBC-9.25  Serial CBC's  01-31 @ 18:49  Hct-31.9 / Hgb-9.6 / Plat-258 / RBC-4.32 / WBC-10.89      02-04    136  |  98  |  34<H>  ----------------------------<  101<H>  3.9   |  27  |  1.0    Ca    8.5      04 Feb 2020 05:52  Phos  3.4     02-03  Mg     1.8     02-03    TPro  5.6<L>  /  Alb  3.0<L>  /  TBili  1.3<H>  /  DBili  x   /  AST  25  /  ALT  14  /  AlkPhos  86  02-04      PT/INR - ( 03 Feb 2020 20:50 )   PT: 21.30 sec;   INR: 1.85 ratio         PTT - ( 03 Feb 2020 20:50 )  PTT:25.0 sec                BLOOD SMEAR INTERPRETATION:       RADIOLOGY & ADDITIONAL STUDIES: Patient is a 64y old  Male who presents with a chief complaint of intertrochanteric fracture of right hip (04 Feb 2020 08:15)      HPI:  The patient is a 64 year-old male with a PMH of CAD s/p CABG, HFrEF (11%), DM (on insulin pump), pulmonary hypertension, Celiac disease, diverticulitis, s/p right total knee replacement October 2019 complicated by infection s/p abx course w/ PICC, presenting following a mechanical fall. He reports that over the last several days he has been feeling more tired; last night he felt especially tired and sat down in the chair. After then getting up to go to the kitchen, he "fell asleep standing," landing on his right buttock. He reports he had been asleep for about 20 seconds. He denies any associated loss of bladder or bowel continence, dizziness or lightheadedness, nausea or vomiting prior to or after the episode. His wife then called EMS and he presented to the ED. On presentation, vitals were T 97.8 F, HR 97, /78; trops 0.03. Right hip xray revealed an intertrochanteric fracture of the right hip, with varus angulation of the distal fragment, subtrochanteric extension with reverse obliquity and avulsion of the lesser trochanter. On admission the patient was resting comfortably; denied any chest pain, shortness of breath, dizziness, confusion, subjective fever, chills, nausea, vomiting, diarrhea. He lives w/ his wife at home; ambulates w/ a cane at baseline. (31 Jan 2020 08:40)    Allergy History:  Patient reports that back in 2005, he developed a severe skin rash (mostly pruritic) after ingesting Enalapril. After that, ACE-Is were listed as an allergy. Patient then reports that about 2 years ago, he was given Entresto and at the time he broke out into hives specifically on his hands. However, he later saw a dermatologist who was unsure as to whether it could have also been a contact dermatitis. s       ROS:  Negative except for:    PAST MEDICAL & SURGICAL HISTORY:  Diabetic neuropathy  STEMI (ST elevation myocardial infarction)  Diverticulitis  MRSA bacteremia  History of celiac disease  CHF (congestive heart failure): EF ~ 25%  HTN (hypertension)  Diabetes: on insulin pump  Blood clot due to device, implant, or graft: was on blood thinners  HLD (hyperlipidemia)  Osteoarthritis  Atherosclerosis of coronary artery: CAD (coronary artery disease)  Status post percutaneous transluminal coronary angioplasty: in 2012  Surgery, elective: Right shoulder  Surgery, elective: right knee wound debridement  S/P TKR (total knee replacement), right: with infection Mrsa   per pt he was cleared from MRSA infection  S/P CABG x 1: 2018  Stented coronary artery: 10/18 heart attack  INFERIOR WALL MI  Other postprocedural status: Fixation hardware in foot LEFT      SOCIAL HISTORY:    FAMILY HISTORY:  Family history of allergies: daughter  Family history of heart disease (Mother)      MEDICATIONS  (STANDING):  acetaminophen   Tablet .. 650 milliGRAM(s) Oral every 6 hours  amoxicillin  875 milliGRAM(s)/clavulanate 1 Tablet(s) Oral two times a day  aspirin enteric coated 81 milliGRAM(s) Oral daily  atorvastatin Oral Tab/Cap - Peds 40 milliGRAM(s) Oral daily  collagenase Ointment 1 Application(s) Topical two times a day  dextrose 5%. 1000 milliLiter(s) (50 mL/Hr) IV Continuous <Continuous>  dextrose 50% Injectable 12.5 Gram(s) IV Push once  dextrose 50% Injectable 25 Gram(s) IV Push once  dextrose 50% Injectable 25 Gram(s) IV Push once  digoxin     Tablet 0.125 milliGRAM(s) Oral daily  DULoxetine 90 milliGRAM(s) Oral daily  enoxaparin Injectable 40 milliGRAM(s) SubCutaneous daily  magnesium sulfate  IVPB 2 Gram(s) IV Intermittent once  metolazone 2.5 milliGRAM(s) Oral daily  metoprolol succinate  milliGRAM(s) Oral daily  pantoprazole    Tablet 40 milliGRAM(s) Oral before breakfast  prasugrel 10 milliGRAM(s) Oral daily  sodium chloride 0.9%. 500 milliLiter(s) (500 mL/Hr) IV Continuous <Continuous>    MEDICATIONS  (PRN):  dextrose 40% Gel 15 Gram(s) Oral once PRN Blood Glucose LESS THAN 70 milliGRAM(s)/deciliter  glucagon  Injectable 1 milliGRAM(s) IntraMuscular once PRN Glucose LESS THAN 70 milligrams/deciliter  morphine  - Injectable 2 milliGRAM(s) IV Push every 4 hours PRN Severe Pain (7 - 10)  oxyCODONE    IR 5 milliGRAM(s) Oral every 4 hours PRN Moderate Pain (4 - 6)      Allergies    ACE inhibitors (Hives)  enalapril (Hives)  rifampin (Angioedema)  vancomycin (Angioedema)    Intolerances        Vital Signs Last 24 Hrs  T(C): 36.1 (04 Feb 2020 07:44), Max: 36.7 (04 Feb 2020 00:00)  T(F): 96.9 (04 Feb 2020 07:44), Max: 98.1 (04 Feb 2020 00:00)  HR: 89 (04 Feb 2020 07:44) (80 - 89)  BP: 136/61 (04 Feb 2020 07:44) (106/62 - 136/61)  BP(mean): --  RR: 18 (04 Feb 2020 07:44) (16 - 18)  SpO2: --    PHYSICAL EXAM  General: adult in NAD  HEENT: clear oropharynx, anicteric sclera, pink conjunctiva  Neck: supple  CV: normal S1/S2 with no murmur rubs or gallops  Lungs: positive air movement b/l ant lungs,clear to auscultation, no wheezes, no rales  Abdomen: soft non-tender non-distended, no hepatosplenomegaly  Ext: no clubbing cyanosis or edema  Skin: no rashes and no petechiae  Neuro: alert and oriented X 4, no focal deficits      LABS:                          9.7    8.39  )-----------( 254      ( 04 Feb 2020 05:52 )             31.7         Mean Cell Volume : 70.1 fL  Mean Cell Hemoglobin : 21.5 pg  Mean Cell Hemoglobin Concentration : 30.6 g/dL  Auto Neutrophil # : 5.86 K/uL  Auto Lymphocyte # : 1.00 K/uL  Auto Monocyte # : 1.19 K/uL  Auto Eosinophil # : 0.27 K/uL  Auto Basophil # : 0.05 K/uL  Auto Neutrophil % : 69.9 %  Auto Lymphocyte % : 11.9 %  Auto Monocyte % : 14.2 %  Auto Eosinophil % : 3.2 %  Auto Basophil % : 0.6 %      Serial CBC's  02-04 @ 05:52  Hct-31.7 / Hgb-9.7 / Plat-254 / RBC-4.52 / WBC-8.39  Serial CBC's  02-03 @ 20:50  Hct-34.3 / Hgb-10.4 / Plat-284 / RBC-4.86 / WBC-9.52  Serial CBC's  02-03 @ 04:47  Hct-30.8 / Hgb-9.4 / Plat-233 / RBC-4.31 / WBC-8.58  Serial CBC's  02-02 @ 06:25  Hct-30.0 / Hgb-9.2 / Plat-219 / RBC-4.10 / WBC-8.18  Serial CBC's  02-01 @ 04:30  Hct-30.1 / Hgb-9.0 / Plat-235 / RBC-4.07 / WBC-9.25  Serial CBC's  01-31 @ 18:49  Hct-31.9 / Hgb-9.6 / Plat-258 / RBC-4.32 / WBC-10.89      02-04    136  |  98  |  34<H>  ----------------------------<  101<H>  3.9   |  27  |  1.0    Ca    8.5      04 Feb 2020 05:52  Phos  3.4     02-03  Mg     1.8     02-03    TPro  5.6<L>  /  Alb  3.0<L>  /  TBili  1.3<H>  /  DBili  x   /  AST  25  /  ALT  14  /  AlkPhos  86  02-04      PT/INR - ( 03 Feb 2020 20:50 )   PT: 21.30 sec;   INR: 1.85 ratio         PTT - ( 03 Feb 2020 20:50 )  PTT:25.0 sec                BLOOD SMEAR INTERPRETATION:       RADIOLOGY & ADDITIONAL STUDIES:

## 2020-02-04 NOTE — PROGRESS NOTE ADULT - ASSESSMENT
65yo M with Past Medical History CAD status post CABG, Chronic Systolic CHF (11%), DM (on insulin pump), pulmonary hypertension, Celiac disease, diverticulitis, status post right total knee replacement October 2019 complicated by infection status post an extended course of IV antibiotics admitted status post mechanical fall complicated by right hip fracture.     Fall complicated by right hip fracture: WBAT to the RLE.  Hemoglobin has remained stable @ 9.4 following surgery.  The patient will eventually benefit from acute rehab  Chronic wound to the right knee: follow-up with Burn Sx later this afternoon for management of RLE wound.  CAD status post CABG/Chronic Systolic CHF: off IV fluids.  Continue Metolazone 2.5mg PO q24 and Metoprolol ER 150mg PO q24h.   For prior CAD status post CABG, continue ASA and Effient.  EPS recommendations were reviewed; the patient is a candidate for AICD placement.  Please consult Infectious Disease for clearance.  Allergy/immunology recommendations were reviewed; the patient is currently not a candidate for ACEI or ARB trial until beta blocker can be held.  DMII: the patient manages his own diabetes.  Continue CHO consistent and monitor fingersticks with meals  GI prophylaxis    #Progress Note Handoff    Pending:  Consults: Burn Sx for STSG  Future Disposition: 4A

## 2020-02-04 NOTE — PROGRESS NOTE ADULT - SUBJECTIVE AND OBJECTIVE BOX
SUBJECTIVE:    Patient is a 64y old Male who presents with a chief complaint of intertrochanteric fracture of right hip (04 Feb 2020 07:31)    Currently admitted to medicine with the primary diagnosis of Intertrochanteric fracture of right hip     Today is hospital day 4d. This morning he is resting comfortably in bed. The patient reports increased frequency/ urgency with urination associated with lower abdominal pain    ROS:   CONSTITUTIONAL: No weakness, fevers or chills   EYES/ENT: No visual changes; No vertigo or throat pain   NECK: No pain or stiffness   RESPIRATORY: No cough, wheezing, hemoptysis; No shortness of breath   CARDIOVASCULAR: No chest pain or palpitations   GASTROINTESTINAL: No abdominal or epigastric pain. No nausea, vomiting, or hematemesis; No diarrhea or constipation. No melena or hematochezia.  GENITOURINARY: No dysuria, increased  frequency and urgency associated with lower abdominal pain only when patient urinates   NEUROLOGICAL: No numbness or weakness  SKIN: No itching, rashes      PAST MEDICAL & SURGICAL HISTORY  Diabetic neuropathy  STEMI (ST elevation myocardial infarction)  Diverticulitis  MRSA bacteremia  History of celiac disease  CHF (congestive heart failure): EF ~ 25%  HTN (hypertension)  Diabetes: on insulin pump  Blood clot due to device, implant, or graft: was on blood thinners  HLD (hyperlipidemia)  Osteoarthritis  Atherosclerosis of coronary artery: CAD (coronary artery disease)  Status post percutaneous transluminal coronary angioplasty: in 2012  Surgery, elective: Right shoulder  Surgery, elective: right knee wound debridement  S/P TKR (total knee replacement), right: with infection Mrsa   per pt he was cleared from MRSA infection  S/P CABG x 1: 2018  Stented coronary artery: 10/18 heart attack  INFERIOR WALL MI  Other postprocedural status: Fixation hardware in foot LEFT      ALLERGIES:  ACE inhibitors (Hives)  enalapril (Hives)  rifampin (Angioedema)  vancomycin (Angioedema)    MEDICATIONS:  STANDING MEDICATIONS  acetaminophen   Tablet .. 650 milliGRAM(s) Oral every 6 hours  amoxicillin  875 milliGRAM(s)/clavulanate 1 Tablet(s) Oral two times a day  aspirin enteric coated 81 milliGRAM(s) Oral daily  atorvastatin Oral Tab/Cap - Peds 40 milliGRAM(s) Oral daily  collagenase Ointment 1 Application(s) Topical two times a day  dextrose 5%. 1000 milliLiter(s) IV Continuous <Continuous>  dextrose 50% Injectable 12.5 Gram(s) IV Push once  dextrose 50% Injectable 25 Gram(s) IV Push once  dextrose 50% Injectable 25 Gram(s) IV Push once  digoxin     Tablet 0.125 milliGRAM(s) Oral daily  DULoxetine 90 milliGRAM(s) Oral daily  enoxaparin Injectable 40 milliGRAM(s) SubCutaneous daily  metolazone 2.5 milliGRAM(s) Oral daily  metoprolol succinate  milliGRAM(s) Oral daily  pantoprazole    Tablet 40 milliGRAM(s) Oral before breakfast  prasugrel 10 milliGRAM(s) Oral daily  sodium chloride 0.9%. 500 milliLiter(s) IV Continuous <Continuous>    PRN MEDICATIONS  dextrose 40% Gel 15 Gram(s) Oral once PRN  glucagon  Injectable 1 milliGRAM(s) IntraMuscular once PRN  morphine  - Injectable 2 milliGRAM(s) IV Push every 4 hours PRN  oxyCODONE    IR 5 milliGRAM(s) Oral every 4 hours PRN    VITALS:   T(F): 96.9  HR: 89  BP: 136/61  RR: 18  SpO2: --    LABS:                          9.7    8.39  )-----------( 254      ( 04 Feb 2020 05:52 )             31.7     02-04    136  |  98  |  34<H>  ----------------------------<  101<H>  3.9   |  27  |  1.0    Ca    8.5      04 Feb 2020 05:52  Phos  3.4     02-03  Mg     1.8     02-03    TPro  5.6<L>  /  Alb  3.0<L>  /  TBili  1.3<H>  /  DBili  x   /  AST  25  /  ALT  14  /  AlkPhos  86  02-04    PT/INR - ( 03 Feb 2020 20:50 )   PT: 21.30 sec;   INR: 1.85 ratio         PTT - ( 03 Feb 2020 20:50 )  PTT:25.0 sec      RADIOLOGY:  no new today  PHYSICAL EXAM:  GEN: No acute distress  HEENT: normocephalic, atraumatic, aniceteric  LUNGS: Clear to auscultation bilaterally, no rales/wheezing/ rhonchi  HEART: S1/S2 present. RRR, no murmurs  ABD: Soft, non-tender, non-distended. Bowel sounds present; suprapubic tenderness   EXT: right knee ace wrap clean dry and intact ; right hip dressing clean dry and intact  NEURO: AAOX3, normal affect      ASSESSMENT AND PLAN:    The patient is a 64 year-old male with a PMH of CAD s/p CABG, HFrEF (25%), DM (on insulin pump), pulmonary hypertension, Celiac disease, diverticulitis, s/p right total knee replacement October 2019 complicated by infection s/p abx course w/ PICC, presenting following a mechanical fall, admitted for right hip intertrochanteric fracture.      # Mechanical fall s/p right hip intertrochanteric fracture  -Xray hip showed an intertrochanteric fracture of right hip w/ varus angulation of distal fragment; subtrochanteric extension w/ reverse obliquity and avulsion of lesser trochanter  -POD 3 s/p IMN fixation   -morphine, oxycodone PRN   -PT/rehab: possible 4a candidate     # S/p right knee total replacement complicated by infection/chronic wound s/p abx w/ PICC and NPWt  -Patient reports he had been on PO doxycycline to which he developed a reaction (edema in hands)  -Switched to Augmentin 875/125 PO twice daily but he does not know the recommended abx course  -s/p IV cefazolin   -c/w Augmentin 875/125 mg PO twice daily for now  - Burn following: possible STSG this week ?   - scheduled for debridement this afternoon with Burn - patient to be transferred to  after , given his cardiac comorbidity     # HFrEF   -EF 11% in December 2019  -Patient reports he was seen by Dr. Garcia who recommended a biventricular pacemaker; no intervention done as of yet as the patient with chronic wound in the right knee , EP will re-evaluate at this admission  -C/w digoxin 125 mcg PO qD,  metolazone 2.5 mg PO, metoprolol succinate  mg PO qD  - Vitals per routine  - Cardiology Dr. Jessica roach appreciated   - Allergy consult f/u for ACE/ARB allergy - per patient saw allergist outpatient and was supposed to get a challenge done, however had a hip fracture and did not follow up as he came to the hospital   - E/P Consult f/u for ? biv AICD placement     # Urinary frequency/ Urgency  - associated with lower abdominal pain and suprapubic tenderness  - f/u U/A , Urine Clx    # CAD s/p CABG  -C/w aspirin 81 mg PO qD,  metoprolol succinate  mg PO qD,  prasugrel 10 mg PO qD   - atorvastatin 40 mg qhs (on zetia at home)    # DM w/ peripheral neuropathy  -On insulin pump; on Trulicity/Jardiance at home  -FS qAC, qHS; if > 180, adjust insulin pump as needed  -C/w duloxetine 90 mg PO qD    # HLD  -On Zetia at home  -c/w atorvastatin 40 mg PO qHS for now      # DVT ppx- Lovenox 40 mg SQ qHS  # GI ppx- Protonix 40 mg PO qAM  # Diet- NPO for procedure today  # Activity- ambulate as tolerated   # Dispo- possible 4a  # Full code    Handoff: possible STSG with burn this week; debridement of right knee this afternoon patient to be transferred to 3C after , given his cardiac comorbidity

## 2020-02-04 NOTE — CONSULT NOTE ADULT - ASSESSMENT
Patient is a 64 year-old male with a PMH of CAD s/p CABG, HFrEF (11%), DM (on insulin pump), pulmonary hypertension, Celiac disease, diverticulitis, s/p right total knee replacement October 2019 complicated by infection s/p abx course w/ PICC, presenting following a mechanical fall. Allergy consult requested for trial of ACE-I/ ARB therapy for optimization of heart failure. Patient is a 64 year-old male with a PMH of CAD s/p CABG, HFrEF (11%), DM (on insulin pump), pulmonary hypertension, Celiac disease, diverticulitis, s/p right total knee replacement October 2019 complicated by infection s/p abx course w/ PICC, presenting following a mechanical fall. Allergy consult requested for trial of ACE-I/ ARB therapy for optimization of heart failure.     Recommendations:  At this time, the patient is on a beta blocker which is a contraindication to attempting a trial of low dose oral challenge. Beta blockers are known to exacerbate allergic symptoms that could be life threating if attempted. Will defer to primary cardiologist to see if patient can tolerate any form of other therapy or try low dose challenge if they feel it is absolutely necessary.

## 2020-02-04 NOTE — CHART NOTE - NSCHARTNOTEFT_GEN_A_CORE
PACU ANESTHESIA ADMISSION NOTE      Procedure: Open reduction and internal fixation of right hip using intramedullary hip screw    Post op diagnosis:  Intertrochanteric fracture of right hip      ____  Intubated  TV:______       Rate: ______      FiO2: ______    __x__  Patent Airway    ____  Full return of protective reflexes    ____  Full recovery from anesthesia / back to baseline     Vitals:   T:      99.1     R:       12           BP:   110/60               Sat:       99%             P:  82      Mental Status:  __x__ Awake   _____ Alert   _____ Drowsy   _____ Sedated    Nausea/Vomiting:  __x__ NO  ______Yes,   See Post - Op Orders          Pain Scale (0-10):  _____    Treatment: ____ None    ___x_ See Post - Op/PCA Orders    Post - Operative Fluids:   ____ Oral   ___x_ See Post - Op Orders    Plan: Discharge:   ____Home       ___x__Floor     _____Critical Care    _____  Other:_________________    Comments:  Uneventful intraoperative course. Defibrillator pads on throughout the intraoperative course. No anesthesia issues or complications noted.  Patient stable upon arrival to PACU. Report given to RN. Discharge when criteria met.

## 2020-02-04 NOTE — PROGRESS NOTE ADULT - SUBJECTIVE AND OBJECTIVE BOX
FLOWER MARISCAL [id number]    Hospitalist Note  65yo M with Past Medical History CAD status post CABG, Chronic Systolic CHF (11%), DM (on insulin pump), pulmonary hypertension, Celiac disease, diverticulitis, status post right total knee replacement October 2019 complicated by infection status post an extended course of IV antibiotics admitted status post mechanical fall complicated by right hip fracture.  Status post ORIF.    Overnight events/Updates: The patient is anxiously awaiting to undergo debridement of his right lower extremity wound by Burn Sx.    Vital Signs Last 24 Hrs  T(C): 36.4 (04 Feb 2020 15:00), Max: 36.7 (04 Feb 2020 00:00)  T(F): 97.5 (04 Feb 2020 11:20), Max: 98.1 (04 Feb 2020 00:00)  HR: 79 (04 Feb 2020 15:00) (79 - 89)  BP: 120/66 (04 Feb 2020 15:00) (106/62 - 136/61)  BP(mean): --  RR: 18 (04 Feb 2020 15:00) (16 - 18)  SpO2: --    Physical Examination:  General: AAO x 3  HEENT: PERRLA, EOMI  CV= S1 & S2 appreciated  Lungs=CTA BL  Abdominal Examination= + BS, Soft, NT/ND  Extremity Examination= dressing to the right knee    ROS: No chest pain, no shortness of breath.  All other systems reviewed and are within normal limits except for the complaints in the HPI.    MEDICATIONS  (STANDING):  acetaminophen   Tablet .. 650 milliGRAM(s) Oral every 6 hours  amoxicillin  875 milliGRAM(s)/clavulanate 1 Tablet(s) Oral two times a day  aspirin enteric coated 81 milliGRAM(s) Oral daily  atorvastatin Oral Tab/Cap - Peds 40 milliGRAM(s) Oral daily  collagenase Ointment 1 Application(s) Topical two times a day  dextrose 5%. 1000 milliLiter(s) (50 mL/Hr) IV Continuous <Continuous>  dextrose 50% Injectable 12.5 Gram(s) IV Push once  dextrose 50% Injectable 25 Gram(s) IV Push once  dextrose 50% Injectable 25 Gram(s) IV Push once  digoxin     Tablet 0.125 milliGRAM(s) Oral daily  DULoxetine 90 milliGRAM(s) Oral daily  enoxaparin Injectable 40 milliGRAM(s) SubCutaneous daily  magnesium sulfate  IVPB 2 Gram(s) IV Intermittent once  metolazone 2.5 milliGRAM(s) Oral daily  metoprolol succinate  milliGRAM(s) Oral daily  pantoprazole    Tablet 40 milliGRAM(s) Oral before breakfast  prasugrel 10 milliGRAM(s) Oral daily  sodium chloride 0.9%. 500 milliLiter(s) (500 mL/Hr) IV Continuous <Continuous>    MEDICATIONS  (PRN):  dextrose 40% Gel 15 Gram(s) Oral once PRN Blood Glucose LESS THAN 70 milliGRAM(s)/deciliter  glucagon  Injectable 1 milliGRAM(s) IntraMuscular once PRN Glucose LESS THAN 70 milligrams/deciliter  morphine  - Injectable 2 milliGRAM(s) IV Push every 4 hours PRN Severe Pain (7 - 10)  oxyCODONE    IR 5 milliGRAM(s) Oral every 4 hours PRN Moderate Pain (4 - 6)                            9.7    8.39  )-----------( 254      ( 04 Feb 2020 05:52 )             31.7     02-04    136  |  98  |  34<H>  ----------------------------<  101<H>  3.9   |  27  |  1.0    Ca    8.5      04 Feb 2020 05:52  Phos  3.4     02-03  Mg     1.8     02-03    TPro  5.6<L>  /  Alb  3.0<L>  /  TBili  1.3<H>  /  DBili  x   /  AST  25  /  ALT  14  /  AlkPhos  86  02-04      Case discussed with housestaff & family  NU Liu [id number]

## 2020-02-04 NOTE — PROGRESS NOTE ADULT - SUBJECTIVE AND OBJECTIVE BOX
s/p right hip orif pod 4  pt seen at bedside reports pain at hip   as per patient pt is going to or today with burn for debridement of knee wound s/p r tkr with wound breakdown.  Pt was being treated at [facility] for knee wound and now due to hip fracture has been followed by burn team                           9.7    8.39  )-----------( 254      ( 04 Feb 2020 05:52 )             31.7       Orthopaedics Post Op Check    Procedure:      Pt comfortable, without complaints  Denies CP, SOB, N/V, numbness/tingling     Vital Signs Last 24 Hrs  T(C): 36.7 (04 Feb 2020 00:00), Max: 36.7 (04 Feb 2020 00:00)  T(F): 98.1 (04 Feb 2020 00:00), Max: 98.1 (04 Feb 2020 00:00)  HR: 87 (04 Feb 2020 05:40) (73 - 87)  BP: 123/73 (04 Feb 2020 05:40) (101/59 - 123/73)  BP(mean): --  RR: 16 (04 Feb 2020 00:00) (16 - 18)  SpO2: --  AVSS, NAD    Physical exam:    right hip: dressing in place minor serous drainage, staples in place   new dressing applied to hip wound   ace wrap about the knee  nvid   ipc x2 in place                           9.7    8.39  )-----------( 254      ( 04 Feb 2020 05:52 )             31.7   02-04    136  |  98  |  34<H>  ----------------------------<  101<H>  3.9   |  27  |  1.0    Ca    8.5      04 Feb 2020 05:52  Phos  3.4     02-03  Mg     1.8     02-03    TPro  5.6<L>  /  Alb  3.0<L>  /  TBili  1.3<H>  /  DBili  x   /  AST  25  /  ALT  14  /  AlkPhos  86  02-04

## 2020-02-04 NOTE — CONSULT NOTE ADULT - ASSESSMENT
Assessment: 65 YO M with a PMH of CAD s/p CABG (LIMA to LAD), s/p PCI of RCA with instent thrombosis plavix resistance on Effient, ischemic CM/HFrEF (25%), DM (on insulin pump), pulmonary hypertension, Celiac disease, diverticulitis, s/p right total knee replacement October 2019 complicated by infection s/p abx course w/ PICC, presenting following a mechanical fall found to have right hip IT fracture    Plan  ICM EF 25%  CAD sp CABG  Right Intertrochanteric FX  SP TRK Replacement with Infection on ABX  - Plan for BiV ICD when patient cleared by ID  - Cont abx as instructed by ID  - Pt c/o urinary frequency and dysuria, check U/A and UCx  - Keep defibrillator pads on patient  - Daily EKG  - Recommend infectious disease consult  - Will cont to monitor Assessment: 63 YO M with a PMH of CAD s/p CABG (LIMA to LAD), s/p PCI of RCA with instent thrombosis plavix resistance on Effient, ischemic CM/HFrEF (25%), DM (on insulin pump), pulmonary hypertension, Celiac disease, diverticulitis, s/p right total knee replacement October 2019 complicated by infection s/p abx course w/ PICC, presenting following a mechanical fall found to have right hip IT fracture    Plan  ICM EF 25%  CAD sp CABG  Right Intertrochanteric FX  SP TRK Replacement with Infection on ABX  - Plan for BiV ICD once infection cleared, prior to discharge  - ID follow-up  - Cont abx as instructed by ID  - Blood cultures once off antibiotics  - Pt c/o urinary frequency and dysuria, check U/A and UCx  - Keep defibrillator pads on patient  - Daily EKG  - Recommend infectious disease consult  - Will cont to monitor

## 2020-02-05 LAB
ALBUMIN SERPL ELPH-MCNC: 2.6 G/DL — LOW (ref 3.5–5.2)
ALP SERPL-CCNC: 76 U/L — SIGNIFICANT CHANGE UP (ref 30–115)
ALT FLD-CCNC: 13 U/L — SIGNIFICANT CHANGE UP (ref 0–41)
ANION GAP SERPL CALC-SCNC: 12 MMOL/L — SIGNIFICANT CHANGE UP (ref 7–14)
AST SERPL-CCNC: 21 U/L — SIGNIFICANT CHANGE UP (ref 0–41)
BASOPHILS # BLD AUTO: 0 K/UL — SIGNIFICANT CHANGE UP (ref 0–0.2)
BASOPHILS NFR BLD AUTO: 0 % — SIGNIFICANT CHANGE UP (ref 0–1)
BILIRUB SERPL-MCNC: 1.7 MG/DL — HIGH (ref 0.2–1.2)
BUN SERPL-MCNC: 30 MG/DL — HIGH (ref 10–20)
CALCIUM SERPL-MCNC: 8.4 MG/DL — LOW (ref 8.5–10.1)
CHLORIDE SERPL-SCNC: 96 MMOL/L — LOW (ref 98–110)
CO2 SERPL-SCNC: 27 MMOL/L — SIGNIFICANT CHANGE UP (ref 17–32)
CREAT SERPL-MCNC: 1.1 MG/DL — SIGNIFICANT CHANGE UP (ref 0.7–1.5)
DIGOXIN SERPL-MCNC: 0.6 NG/ML — LOW (ref 0.8–2)
EOSINOPHIL # BLD AUTO: 0 K/UL — SIGNIFICANT CHANGE UP (ref 0–0.7)
EOSINOPHIL NFR BLD AUTO: 0 % — SIGNIFICANT CHANGE UP (ref 0–8)
GLUCOSE BLDC GLUCOMTR-MCNC: 157 MG/DL — HIGH (ref 70–99)
GLUCOSE BLDC GLUCOMTR-MCNC: 204 MG/DL — HIGH (ref 70–99)
GLUCOSE BLDC GLUCOMTR-MCNC: 212 MG/DL — HIGH (ref 70–99)
GLUCOSE BLDC GLUCOMTR-MCNC: 234 MG/DL — HIGH (ref 70–99)
GLUCOSE BLDC GLUCOMTR-MCNC: 259 MG/DL — HIGH (ref 70–99)
GLUCOSE BLDC GLUCOMTR-MCNC: 292 MG/DL — HIGH (ref 70–99)
GLUCOSE SERPL-MCNC: 245 MG/DL — HIGH (ref 70–99)
HCT VFR BLD CALC: 34 % — LOW (ref 42–52)
HGB BLD-MCNC: 10.4 G/DL — LOW (ref 14–18)
IMM GRANULOCYTES NFR BLD AUTO: 0.2 % — SIGNIFICANT CHANGE UP (ref 0.1–0.3)
LYMPHOCYTES # BLD AUTO: 0.33 K/UL — LOW (ref 1.2–3.4)
LYMPHOCYTES # BLD AUTO: 5.7 % — LOW (ref 20.5–51.1)
MAGNESIUM SERPL-MCNC: 1.6 MG/DL — LOW (ref 1.8–2.4)
MCHC RBC-ENTMCNC: 21.8 PG — LOW (ref 27–31)
MCHC RBC-ENTMCNC: 30.6 G/DL — LOW (ref 32–37)
MCV RBC AUTO: 71.4 FL — LOW (ref 80–94)
MONOCYTES # BLD AUTO: 0.43 K/UL — SIGNIFICANT CHANGE UP (ref 0.1–0.6)
MONOCYTES NFR BLD AUTO: 7.5 % — SIGNIFICANT CHANGE UP (ref 1.7–9.3)
NEUTROPHILS # BLD AUTO: 4.99 K/UL — SIGNIFICANT CHANGE UP (ref 1.4–6.5)
NEUTROPHILS NFR BLD AUTO: 86.6 % — HIGH (ref 42.2–75.2)
NRBC # BLD: 0 /100 WBCS — SIGNIFICANT CHANGE UP (ref 0–0)
PLATELET # BLD AUTO: 306 K/UL — SIGNIFICANT CHANGE UP (ref 130–400)
POTASSIUM SERPL-MCNC: 4.2 MMOL/L — SIGNIFICANT CHANGE UP (ref 3.5–5)
POTASSIUM SERPL-SCNC: 4.2 MMOL/L — SIGNIFICANT CHANGE UP (ref 3.5–5)
PROT SERPL-MCNC: 5.5 G/DL — LOW (ref 6–8)
RBC # BLD: 4.76 M/UL — SIGNIFICANT CHANGE UP (ref 4.7–6.1)
RBC # FLD: 20.1 % — HIGH (ref 11.5–14.5)
SODIUM SERPL-SCNC: 135 MMOL/L — SIGNIFICANT CHANGE UP (ref 135–146)
WBC # BLD: 5.76 K/UL — SIGNIFICANT CHANGE UP (ref 4.8–10.8)
WBC # FLD AUTO: 5.76 K/UL — SIGNIFICANT CHANGE UP (ref 4.8–10.8)

## 2020-02-05 PROCEDURE — 93010 ELECTROCARDIOGRAM REPORT: CPT

## 2020-02-05 PROCEDURE — 99233 SBSQ HOSP IP/OBS HIGH 50: CPT

## 2020-02-05 RX ORDER — MAGNESIUM SULFATE 500 MG/ML
2 VIAL (ML) INJECTION ONCE
Refills: 0 | Status: COMPLETED | OUTPATIENT
Start: 2020-02-05 | End: 2020-02-05

## 2020-02-05 RX ADMIN — MORPHINE SULFATE 2 MILLIGRAM(S): 50 CAPSULE, EXTENDED RELEASE ORAL at 14:25

## 2020-02-05 RX ADMIN — Medication 650 MILLIGRAM(S): at 23:32

## 2020-02-05 RX ADMIN — ATORVASTATIN CALCIUM 40 MILLIGRAM(S): 80 TABLET, FILM COATED ORAL at 13:49

## 2020-02-05 RX ADMIN — Medication 650 MILLIGRAM(S): at 14:25

## 2020-02-05 RX ADMIN — PRASUGREL 10 MILLIGRAM(S): 5 TABLET, FILM COATED ORAL at 12:44

## 2020-02-05 RX ADMIN — Medication 300 MILLIGRAM(S): at 22:45

## 2020-02-05 RX ADMIN — Medication 1 TABLET(S): at 06:16

## 2020-02-05 RX ADMIN — Medication 300 MILLIGRAM(S): at 14:36

## 2020-02-05 RX ADMIN — PANTOPRAZOLE SODIUM 40 MILLIGRAM(S): 20 TABLET, DELAYED RELEASE ORAL at 06:16

## 2020-02-05 RX ADMIN — Medication 12.5 GRAM(S): at 14:36

## 2020-02-05 RX ADMIN — Medication 650 MILLIGRAM(S): at 12:46

## 2020-02-05 RX ADMIN — MORPHINE SULFATE 2 MILLIGRAM(S): 50 CAPSULE, EXTENDED RELEASE ORAL at 13:49

## 2020-02-05 RX ADMIN — ENOXAPARIN SODIUM 40 MILLIGRAM(S): 100 INJECTION SUBCUTANEOUS at 13:49

## 2020-02-05 RX ADMIN — MORPHINE SULFATE 2 MILLIGRAM(S): 50 CAPSULE, EXTENDED RELEASE ORAL at 08:21

## 2020-02-05 RX ADMIN — DULOXETINE HYDROCHLORIDE 90 MILLIGRAM(S): 30 CAPSULE, DELAYED RELEASE ORAL at 12:44

## 2020-02-05 RX ADMIN — MORPHINE SULFATE 2 MILLIGRAM(S): 50 CAPSULE, EXTENDED RELEASE ORAL at 06:16

## 2020-02-05 RX ADMIN — Medication 81 MILLIGRAM(S): at 12:44

## 2020-02-05 RX ADMIN — Medication 650 MILLIGRAM(S): at 18:42

## 2020-02-05 RX ADMIN — Medication 650 MILLIGRAM(S): at 18:03

## 2020-02-05 RX ADMIN — Medication 150 MILLIGRAM(S): at 06:16

## 2020-02-05 RX ADMIN — Medication 0.12 MILLIGRAM(S): at 06:16

## 2020-02-05 NOTE — CONSULT NOTE ADULT - ASSESSMENT
65yo M with Past Medical History CAD status post CABG, Chronic Systolic CHF (11%), DM (on insulin pump), pulmonary hypertension, Celiac disease, diverticulitis, status post right total knee replacement October 2019 complicated by infection status post an extended course of IV antibiotics admitted status post mechanical fall complicated by right hip fracture.  Status post ORIF.    IMPRESSION:  #1/4 BURN selective debridement of wound right knee, debridement and skin graft right knee, right thigh donor, wound vac.  -no abscess/cellulitis  -no infection of underlying hardware  -will maintain on po ABx  -11/22 WCx ORSA    RECOMMENDATIONS:  -po Doxycycline 100 mg q12h for total 10 days 65yo M with Past Medical History CAD status post CABG, Chronic Systolic CHF (11%), DM (on insulin pump), pulmonary hypertension, Celiac disease, diverticulitis, status post right total knee replacement October 2019 complicated by infection status post an extended course of IV antibiotics admitted status post mechanical fall complicated by right hip fracture.  Status post ORIF.    IMPRESSION:  #1/4 BURN selective debridement of wound right knee, debridement and skin graft right knee, right thigh donor, wound vac.  -no abscess/cellulitis  -no infection of underlying hardware  -will maintain on po ABx  -11/22 WCx ORSA    RECOMMENDATIONS:  -po Clindamycin 300 mg q8h for total 10 days ( hands swell up with doxycycline )

## 2020-02-05 NOTE — DIETITIAN INITIAL EVALUATION ADULT. - PERTINENT MEDS FT
Effient, Aspirin, Enoxaparin, abx, NaCl, Atorvastatin, Digoxin, Metolazone, Metoprolol, Pantoprazole

## 2020-02-05 NOTE — PROGRESS NOTE ADULT - ASSESSMENT
63yo M with Past Medical History CAD status post CABG, Chronic Systolic CHF (11%), DM (on insulin pump), pulmonary hypertension, Celiac disease, diverticulitis, status post right total knee replacement October 2019 complicated by infection status post an extended course of IV antibiotics admitted status post mechanical fall complicated by right hip fracture.  Status post ORIF.      1.	Fall complicated by Rt hip Fx S/P ORIF  2.	Ch. Rt. Knee wound  3.	CAD S/P CABG  4.	Ch. HFrEF  5.	CM - EF 11%         PLAN:    ·	Cont tele 65yo M with Past Medical History CAD status post CABG, Chronic Systolic CHF (11%), DM (on insulin pump), pulmonary hypertension, Celiac disease, diverticulitis, status post right total knee replacement October 2019 complicated by infection status post an extended course of IV antibiotics admitted status post mechanical fall complicated by right hip fracture.  Status post ORIF.      1.	Fall complicated by Rt hip Fx S/P ORIF  2.	Ch. Rt. Knee wound S/P selective debridement and skin graft.   3.	CAD S/P CABG  4.	Ch. HFrEF  5.	CM - EF 11%         PLAN:    ·	Cont tele  ·	ID F/U noted. No abscess or cellulitis of Rt knee. No underlying hardware infection  ·	Wound vac care as per Burn  ·	ID recommended Clindamycin 300 mg po q 8h for 10 days. D/C Augmentin  ·	Will D/W the ID if the pt is cleared for AICD  ·	Not on ACE-I likely due to low BP. Will D/W the cardiology  ·	NIKKI harris noted.   ·	Cont his other home meds.

## 2020-02-05 NOTE — DIETITIAN INITIAL EVALUATION ADULT. - PHYSICAL APPEARANCE
well nourished/Slightly Confused; 1+ generalized edema R Hip; BMI: 32.9; (2 hts documented in EMR, confirmed ht with pt (6'2)) IBW: 86.3kg; GI: Nausea and 1 episode of vomiting (2/4), no constipation or diarrhea; Skin: Ecchymosis, wound; No chewing or swallowing difficulty

## 2020-02-05 NOTE — DIETITIAN INITIAL EVALUATION ADULT. - OTHER INFO
Pertinent Medical Information: Pt presents following a mechanical fall. Fall complicated by Rt hip Fx S/P ORIF; chronic Rt. Knee wound; chronic HFrEF per progress notes (2/5) DM noted. Burn scheduled patient for wound debridement today per care coordination note (2/5)    Pertinent Subjective Information: Pt reports good appetite, eats >75% of all his meal trays. RD observed inconsistent reporting, pt seemed confused. However, complains about hospital food and reports he doesn't like vegetables much, but will eat if they are cooked or sauteed. he reported that his wife often brings him food at home. NKFA or intolerances. He takes Vit C and Fish Oil at home. His diet prior to celiac was not great, however he has made positive changes recently.  He was diagnosed with celiac disease in october and claims he had lost some wt then d/t vomiting and diarrhea. UBW is between 113 - 118kg, however claims to have lost 25-30lbs intentionally. Pt weighs 116.7kg today. Wt today is in UBW range. Pt requested info about Celiac disease, provided hand-outs related to foods recommended and not recommended along with label reading tips for disease. Pertinent Medical Information: Pt presents following a mechanical fall. Fall complicated by Rt hip Fx S/P ORIF; chronic Rt. Knee wound; chronic HFrEF per progress notes (2/5) DM noted. Burn scheduled patient for wound debridement today per care coordination note (2/5)    Pertinent Subjective Information: Pt reports good appetite, eats >75% of all his meal trays. RD observed inconsistent reporting, pt seemed confused. Complains about hospital food and reports he doesn't like vegetables much, but will eat if they are cooked or sauteed. he reported that his wife often brings him food from home. NKFA or intolerances. He takes Vit C and Fish Oil at home. His diet prior to celiac was not great, however he has made positive changes recently. He was diagnosed with celiac disease in october and claims he had lost some wt then d/t vomiting and diarrhea. UBW is between 113 - 118kg, however claims to have lost 25-30lbs intentionally. Pt weighs 116.7kg today. Insufficient evidence to diiagnose pt with PCM. Wt today is in UBW range. Pt requested info about Celiac disease, provided hand-outs r/t celiac nutrition therapy along with label reading tips for disease. Pertinent Medical Information: Pt presents following a mechanical fall. Fall complicated by Rt hip Fx S/P ORIF; chronic Rt. Knee wound; chronic HFrEF per progress notes (2/5) DM noted. Burn scheduled patient for wound debridement today per care coordination note (2/5)    Pertinent Subjective Information: Pt reports good appetite, eats >75% of all his meal trays. RD observed inconsistent reporting, pt seemed confused. Complains about hospital food and reports he doesn't like vegetables much, but will eat if they are cooked or sauteed. he reported that his wife often brings him food from home. NKFA or intolerances. He takes Vit C and Fish Oil at home. His diet prior to celiac dx was not great, however he has made positive changes recently. He was diagnosed with celiac disease in october and claims he had lost some wt then d/t vomiting and diarrhea. UBW is between 113 - 118kg, however claims to have lost 25-30lbs intentionally. Pt weighs 116.7kg today. Insufficient evidence to diiagnose pt with PCM. Wt today is in UBW range. Pt requested info about Celiac disease, provided hand-outs r/t celiac nutrition therapy along with label reading tips for disease.

## 2020-02-05 NOTE — DIETITIAN INITIAL EVALUATION ADULT. - RD TO REMAIN AVAILABLE
yes/NUTRITION INTERVENTION: Meals and Snacks; NUTRITION MONDITORING: RD to monitor Energy Intake, Diet Order, Body Composition ( weight ), Nutrition Focused Physical Findings (PO, appetite), yes/NUTRITION INTERVENTION: Meals and Snacks; NUTRITION MONDITORING: RD to monitor Energy Intake, Diet Order, Body Composition ( weight ), Nutrition Focused Physical Findings (PO, appetite)

## 2020-02-05 NOTE — PROGRESS NOTE ADULT - SUBJECTIVE AND OBJECTIVE BOX
FLOWER MARISCAL  64y Male    CHIEF COMPLAINT:    Patient is a 64y old  Male who presents with a chief complaint of intertrochanteric fracture of right hip (2020 06:27)      INTERVAL HPI/OVERNIGHT EVENTS:    Patient seen and examined.    ROS: All other systems are negative.    Vital Signs:    T(F): 96.4 (20 @ 05:11), Max: 99.1 (20 @ 17:15)  HR: 86 (20 @ 06:10) (79 - 89)  BP: 112/58 (20 @ 06:10) (105/57 - 136/61)  RR: 18 (20 @ 05:11) (13 - 20)  SpO2: 96% (20 @ 20:49) (96% - 100%)  I&O's Summary    2020 07:01  -  2020 07:00  --------------------------------------------------------  IN: 120 mL / OUT: 1400 mL / NET: -1280 mL      Daily Height in cm: 187.96 (2020 21:24)    Daily Weight in k.7 (2020 05:11)  CAPILLARY BLOOD GLUCOSE      POCT Blood Glucose.: 259 mg/dL (2020 06:22)  POCT Blood Glucose.: 184 mg/dL (2020 22:06)  POCT Blood Glucose.: 149 mg/dL (2020 18:18)  POCT Blood Glucose.: 103 mg/dL (2020 11:36)  POCT Blood Glucose.: 85 mg/dL (2020 08:09)      PHYSICAL EXAM:    GENERAL:  NAD  SKIN: No rashes or lesions  HENT: Atrumatic. Normocephalic. PERRL. Moist membranes.  NECK: Supple, No JVD. No lymphadenopathy.  PULMONARY: CTA B/L. No wheezing. No rales  CVS: Normal S1, S2. Rate and Rythm are regular. No murmurs.  ABDOMEN/GI: Soft, Nontender, Nondistended; BS present  EXTREMITIES: Peripheral pulses intact. No edema B/L LE.  NEUROLOGIC:  No motor or sensory deficit.  PSYCH: Alert & oriented x 3    Consultant(s) Notes Reviewed:  [x ] YES  [ ] NO  Care Discussed with Consultants/Other Providers [ x] YES  [ ] NO    EKG reviewed  Telemetry reviewed    LABS:                        10.4   5.76  )-----------( 306      ( 2020 06:23 )             34.0     02-04    136  |  98  |  34<H>  ----------------------------<  101<H>  3.9   |  27  |  1.0    Ca    8.5      2020 05:52  Phos  3.4     02-03  Mg     1.8     02-03    TPro  5.6<L>  /  Alb  3.0<L>  /  TBili  1.3<H>  /  DBili  x   /  AST  25  /  ALT  14  /  AlkPhos  86  02-04    PT/INR - ( 2020 20:50 )   PT: 21.30 sec;   INR: 1.85 ratio         PTT - ( 2020 20:50 )  PTT:25.0 sec          RADIOLOGY & ADDITIONAL TESTS:    < from: Transthoracic Echocardiogram (. @ 10:55) >    Summary:   1. Severely decreased global left ventricular systolic function.   2. Multiple left ventricular regional wall motion abnormalities exist.   See wall motion findings.   3. Elevated left atrial and left ventricular end-diastolic pressures.   4. Spectral Doppler shows restrictive pattern of left ventricular   myocardial filling (Grade III diastolic dysfunction).   5. The LVEF by triplane Wilkerson's technique is 11%. The mean global   longitudinal peak strain by speckle tracking is -5.6% which is markedly   reduced.   6. Mild mitral valve regurgitation.   7. Mild-moderate tricuspid regurgitation.   8. Estimated pulmonary artery systolic pressure is 78.6 mmHg assuming a   right atrial pressure of 15 mmHg, which is consistent with severe   pulmonary hypertension.   9. Pulmonary hypertension is present.  10. LA volume Index is 56.9 ml/m² ml/m2.    < end of copied text >    Imaging or report Personally Reviewed:  [ ] YES  [ ] NO    Medications:  Standing  acetaminophen   Tablet .. 650 milliGRAM(s) Oral every 6 hours  amoxicillin  875 milliGRAM(s)/clavulanate 1 Tablet(s) Oral two times a day  aspirin enteric coated 81 milliGRAM(s) Oral daily  atorvastatin Oral Tab/Cap - Peds 40 milliGRAM(s) Oral daily  dextrose 5%. 1000 milliLiter(s) IV Continuous <Continuous>  dextrose 50% Injectable 25 Gram(s) IV Push once  dextrose 50% Injectable 25 Gram(s) IV Push once  dextrose 50% Injectable 12.5 Gram(s) IV Push once  digoxin     Tablet 0.125 milliGRAM(s) Oral daily  DULoxetine 90 milliGRAM(s) Oral daily  enoxaparin Injectable 40 milliGRAM(s) SubCutaneous daily  metolazone 2.5 milliGRAM(s) Oral daily  metoprolol succinate  milliGRAM(s) Oral daily  pantoprazole    Tablet 40 milliGRAM(s) Oral before breakfast  prasugrel 10 milliGRAM(s) Oral daily  sodium chloride 0.9%. 500 milliLiter(s) IV Continuous <Continuous>    PRN Meds  dextrose 40% Gel 15 Gram(s) Oral once PRN  glucagon  Injectable 1 milliGRAM(s) IntraMuscular once PRN  morphine  - Injectable 2 milliGRAM(s) IV Push every 4 hours PRN  oxyCODONE    IR 5 milliGRAM(s) Oral every 4 hours PRN      Case discussed with resident    Care discussed with pt/family FLOWER MARISCAL  64y Male    CHIEF COMPLAINT:    Patient is a 64y old  Male who presents with a chief complaint of intertrochanteric fracture of right hip (2020 06:27)      INTERVAL HPI/OVERNIGHT EVENTS:    Patient seen and examined. Feels better today. No fever. No pain. No palpitations. Having Rt leg wound vac.     ROS: All other systems are negative.    Vital Signs:    T(F): 96.4 (20 @ 05:11), Max: 99.1 (20 @ 17:15)  HR: 86 (20 @ 06:10) (79 - 89)  BP: 112/58 (20 @ 06:10) (105/57 - 136/61)  RR: 18 (20 @ 05:11) (13 - 20)  SpO2: 96% (20 @ 20:49) (96% - 100%)  I&O's Summary    2020 07:01  -  2020 07:00  --------------------------------------------------------  IN: 120 mL / OUT: 1400 mL / NET: -1280 mL      Daily Height in cm: 187.96 (2020 21:24)    Daily Weight in k.7 (2020 05:11)  CAPILLARY BLOOD GLUCOSE      POCT Blood Glucose.: 259 mg/dL (2020 06:22)  POCT Blood Glucose.: 184 mg/dL (2020 22:06)  POCT Blood Glucose.: 149 mg/dL (2020 18:18)  POCT Blood Glucose.: 103 mg/dL (2020 11:36)  POCT Blood Glucose.: 85 mg/dL (2020 08:09)      PHYSICAL EXAM:    GENERAL:  NAD  SKIN: No rashes or lesions  HENT: Atraumatic Normocephalic. PERRL. Moist membranes.  NECK: Supple, No JVD. No lymphadenopathy.  PULMONARY: CTA B/L. No wheezing. No rales  CVS: Normal S1, S2. Rate and Rhythm are regular. No murmurs.  ABDOMEN/GI: Soft, Nontender, Nondistended; BS present  EXTREMITIES: Peripheral pulses intact. No edema B/L LE. Dressing on Rt leg and wound vac.   NEUROLOGIC:  No motor or sensory deficit.  PSYCH: Alert & oriented x 3    Consultant(s) Notes Reviewed:  [x ] YES  [ ] NO  Care Discussed with Consultants/Other Providers [ x] YES  [ ] NO    EKG reviewed  Telemetry reviewed    LABS:                        10.4   5.76  )-----------( 306      ( 2020 06:23 )             34.0     02-04    136  |  98  |  34<H>  ----------------------------<  101<H>  3.9   |  27  |  1.0    Ca    8.5      2020 05:52  Phos  3.4     02-03  Mg     1.8     02-03    TPro  5.6<L>  /  Alb  3.0<L>  /  TBili  1.3<H>  /  DBili  x   /  AST  25  /  ALT  14  /  AlkPhos  86  02-04    PT/INR - ( 2020 20:50 )   PT: 21.30 sec;   INR: 1.85 ratio         PTT - ( 2020 20:50 )  PTT:25.0 sec          RADIOLOGY & ADDITIONAL TESTS:    < from: Transthoracic Echocardiogram (19 @ 10:55) >    Summary:   1. Severely decreased global left ventricular systolic function.   2. Multiple left ventricular regional wall motion abnormalities exist.   See wall motion findings.   3. Elevated left atrial and left ventricular end-diastolic pressures.   4. Spectral Doppler shows restrictive pattern of left ventricular   myocardial filling (Grade III diastolic dysfunction).   5. The LVEF by triplane Wilkerson's technique is 11%. The mean global   longitudinal peak strain by speckle tracking is -5.6% which is markedly   reduced.   6. Mild mitral valve regurgitation.   7. Mild-moderate tricuspid regurgitation.   8. Estimated pulmonary artery systolic pressure is 78.6 mmHg assuming a   right atrial pressure of 15 mmHg, which is consistent with severe   pulmonary hypertension.   9. Pulmonary hypertension is present.  10. LA volume Index is 56.9 ml/m² ml/m2.    < end of copied text >    Imaging or report Personally Reviewed:  [ ] YES  [ ] NO    Medications:  Standing  acetaminophen   Tablet .. 650 milliGRAM(s) Oral every 6 hours  amoxicillin  875 milliGRAM(s)/clavulanate 1 Tablet(s) Oral two times a day  aspirin enteric coated 81 milliGRAM(s) Oral daily  atorvastatin Oral Tab/Cap - Peds 40 milliGRAM(s) Oral daily  dextrose 5%. 1000 milliLiter(s) IV Continuous <Continuous>  dextrose 50% Injectable 25 Gram(s) IV Push once  dextrose 50% Injectable 25 Gram(s) IV Push once  dextrose 50% Injectable 12.5 Gram(s) IV Push once  digoxin     Tablet 0.125 milliGRAM(s) Oral daily  DULoxetine 90 milliGRAM(s) Oral daily  enoxaparin Injectable 40 milliGRAM(s) SubCutaneous daily  metolazone 2.5 milliGRAM(s) Oral daily  metoprolol succinate  milliGRAM(s) Oral daily  pantoprazole    Tablet 40 milliGRAM(s) Oral before breakfast  prasugrel 10 milliGRAM(s) Oral daily  sodium chloride 0.9%. 500 milliLiter(s) IV Continuous <Continuous>    PRN Meds  dextrose 40% Gel 15 Gram(s) Oral once PRN  glucagon  Injectable 1 milliGRAM(s) IntraMuscular once PRN  morphine  - Injectable 2 milliGRAM(s) IV Push every 4 hours PRN  oxyCODONE    IR 5 milliGRAM(s) Oral every 4 hours PRN      Case discussed with resident    Care discussed with pt/family

## 2020-02-05 NOTE — PROGRESS NOTE ADULT - ASSESSMENT
The patient is a 64 year-old male with a PMH of CAD s/p CABG, HFrEF (25%), DM (on insulin pump), pulmonary hypertension, Celiac disease, diverticulitis, s/p right total knee replacement October 2019 complicated by infection s/p abx course w/ PICC, presenting following a mechanical fall, admitted for right hip intertrochanteric fracture, found to have possible infection of right knee incision site for past TKR, s/p debridement on po abx awaiting possible biV ICD placement    # Mechanical fall s/p right hip intertrochanteric fracture  -Xray hip showed an intertrochanteric fracture of right hip w/ varus angulation of distal fragment; subtrochanteric extension w/ reverse obliquity and avulsion of lesser trochanter  -POD 5 s/p IMN fixation  -morphine, oxycodone PRN  -PT/rehab: possible 4a candidate    # S/p right knee total replacement complicated by infection/chronic wound s/p abx w/ PICC and NPWt  - Patient reports he had been on PO doxycycline to which he developed a reaction (edema in hands)  - clindamycin 300 po q8h as per ID  - s/p debridement of right knee, POD 2    # HFrEF   -EF 11% in December 2019  -Patient reports he was seen by Dr. Garcia who recommended a biventricular pacemaker; no intervention done as of yet as the patient with chronic wound in the right knee , EP recommendin biVICD this admission  -C/w digoxin 125 mcg PO qD,  metolazone 2.5 mg PO, metoprolol succinate  mg PO qD  - Vitals per routine  - Cardiology Dr. Jessica roach appreciated   - Allergy consult for ace/arb allergy, recommending not to have both metoprolol and ace/arb at same time, if not at the same time they recommend low dose challenge  - as per ID, no contraindication to implanting ICD now while on PO antibiotics for knee    # Dysuria  - urinalysis negative will continue to monitor    # CAD s/p CABG  -C/w aspirin 81 mg PO qD,  metoprolol succinate  mg PO qD,  prasugrel 10 mg PO qD   - atorvastatin 40 mg qhs (on zetia at home)    # DM w/ peripheral neuropathy  -On insulin pump; on Trulicity/Jardiance at home  -FS qAC, qHS; if > 180, adjust insulin pump as needed  -C/w duloxetine 90 mg PO qD    # HLD  -On Zetia at home  -c/w atorvastatin 40 mg PO qHS for now    PLAN: possible biv ICD this admission    # DVT ppx- Lovenox 40 mg SQ qHS  # GI ppx- Protonix 40 mg PO qAM  # Diet- consistent carbs, gluten gliadin, DASH  # Activity- ambulate as tolerated   # Dispo- possible 4a  # Full code    Handoff: possible STSG with burn this week; debridement of right knee this afternoon patient to be transferred to 3C after , given his cardiac comorbidity The patient is a 64 year-old male with a PMH of CAD s/p CABG, HFrEF (25%), DM (on insulin pump), pulmonary hypertension, Celiac disease, diverticulitis, s/p right total knee replacement October 2019 complicated by infection s/p abx course w/ PICC, presenting following a mechanical fall, admitted for right hip intertrochanteric fracture, found to have possible infection of right knee incision site for past TKR, s/p debridement on po abx awaiting possible biV ICD placement    # Mechanical fall s/p right hip intertrochanteric fracture  -Xray hip showed an intertrochanteric fracture of right hip w/ varus angulation of distal fragment; subtrochanteric extension w/ reverse obliquity and avulsion of lesser trochanter  -POD 5 s/p IMN fixation  -morphine, oxycodone PRN  -PT/rehab: possible 4a candidate    # S/p right knee total replacement complicated by infection/chronic wound s/p abx w/ PICC and NPWt  - Patient reports he had been on PO doxycycline to which he developed a reaction (edema in hands)  - clindamycin 300 po q8h as per ID  - s/p debridement of right knee, POD 2    # HFrEF   -EF 11% in December 2019  -Patient reports he was seen by Dr. Garcia who recommended a biventricular pacemaker; no intervention done as of yet as the patient with chronic wound in the right knee , EP recommendin biVICD this admission  -C/w digoxin 125 mcg PO qD,  metolazone 2.5 mg PO, metoprolol succinate  mg PO qD  - Vitals per routine  - Cardiology Dr. Jessica roach appreciated   - Allergy consult for ace/arb allergy, recommending not to have both metoprolol and ace/arb at same time, if not at the same time they recommend low dose challenge  - as per ID, no contraindication to implanting ICD now while on PO antibiotics for knee    # Dysuria  - urinalysis negative will continue to monitor    # CAD s/p CABG  -C/w aspirin 81 mg PO qD,  metoprolol succinate  mg PO qD,  prasugrel 10 mg PO qD   - atorvastatin 40 mg qhs (on zetia at home)    # DM w/ peripheral neuropathy  -On insulin pump; on Trulicity/Jardiance at home  -FS qAC, qHS; if > 180, adjust insulin pump as needed  -C/w duloxetine 90 mg PO qD    # HLD  -On Zetia at home  -c/w atorvastatin 40 mg PO qHS for now    PLAN: possible biv ICD this admission    # DVT ppx- Lovenox 40 mg SQ qHS  # GI ppx- Protonix 40 mg PO qAM  # Diet- consistent carbs, gluten gliadin, DASH  # Activity- ambulate as tolerated   # Dispo- possible 4a  # Full code

## 2020-02-05 NOTE — DIETITIAN INITIAL EVALUATION ADULT. - ADD RECOMMEND
Add DASH / TLC diet modifier. Continue with Consistent Carbohydrates w/ no evening snacks (Gluten - Gliadin restriction). Pt not at risk, RD to f/u in 7 days. Add DASH / TLC diet modifier. Consider adding Zofran if nausea persists. Continue with Consistent Carbohydrates w/ no evening snacks (Gluten - Gliadin restriction). Pt not at risk, RD to f/u in 7 days. Add DASH / TLC diet modifier. Continue with Consistent Carbohydrates w/ no evening snacks (Gluten - Gliadin restriction). Consider adding Zofran if nausea persists. Pt not at risk, RD to f/u in 7 days.

## 2020-02-05 NOTE — DIETITIAN INITIAL EVALUATION ADULT. - ENERGY NEEDS
Energy - 1683 - 1824kcal/day (ABW X1.2-1.3AF) - obese pt considered  Protein: 93.3 - 117g/kg - (ABW X.8 - 1.0g/kg) - aim towards lower range   Fluid: 1-2L/day or per LIP - CHF considered Energy - 1683 - 1964kcal/day (ABW X1.2-1.4AF) - obese pt considered/ however needs tailored to pt's size  Protein: 93.3 - 117g/kg - (ABW X.8 - 1.0g/kg) - aim towards lower range   Fluid: 1-2L/day or per LIP - CHF considered Energy - 2157 - 2589 kcal/day (IBW 25 - 30kcal/kg) IBW used for BMI >30  Protein: 84 - 100g/kg - ( X1.0 - 1.2g/kg) - aim towards lower range   Fluid: 1-2L/day or per LIP - CHF considered

## 2020-02-05 NOTE — PROGRESS NOTE ADULT - SUBJECTIVE AND OBJECTIVE BOX
FLOWER MARISCAL 64y Male  MRN#: [id number]   CODE STATUS: FULL      SUBJECTIVE  Patient is a 64y old Male who presents with a chief complaint of intertrochanteric fracture of right hip (2020 07:16)    Currently admitted to medicine with the primary diagnosis of Intertrochanteric fracture of right hip     Today is hospital day 5d, and this morning he is laying in bed comfortably and reports no overnight events. This morning pt is still complaining of discomfort while urinating but says that urinary urgency and hesitancy has resolved. Pt also states that his R leg pain has improved slightly compared to yesterday. Denies any headache, CP, SOB, N/V/D, abdominal pain or flank pain.    OBJECTIVE  PAST MEDICAL & SURGICAL HISTORY  Diabetic neuropathy  STEMI (ST elevation myocardial infarction)  Diverticulitis  MRSA bacteremia  History of celiac disease  CHF (congestive heart failure): EF ~ 25%  HTN (hypertension)  Diabetes: on insulin pump  Blood clot due to device, implant, or graft: was on blood thinners  HLD (hyperlipidemia)  Osteoarthritis  Atherosclerosis of coronary artery: CAD (coronary artery disease)  Status post percutaneous transluminal coronary angioplasty: in   Surgery, elective: Right shoulder  Surgery, elective: right knee wound debridement  S/P TKR (total knee replacement), right: with infection Mrsa   per pt he was cleared from MRSA infection  S/P CABG x 1:   Stented coronary artery: 10/18 heart attack  INFERIOR WALL MI  Other postprocedural status: Fixation hardware in foot LEFT    ALLERGIES:  ACE inhibitors (Hives)  enalapril (Hives)  rifampin (Angioedema)  vancomycin (Angioedema)    MEDICATIONS:  STANDING MEDICATIONS  acetaminophen   Tablet .. 650 milliGRAM(s) Oral every 6 hours  amoxicillin  875 milliGRAM(s)/clavulanate 1 Tablet(s) Oral two times a day  aspirin enteric coated 81 milliGRAM(s) Oral daily  atorvastatin Oral Tab/Cap - Peds 40 milliGRAM(s) Oral daily  dextrose 5%. 1000 milliLiter(s) (50 mL/Hr) IV Continuous <Continuous>  dextrose 50% Injectable 25 Gram(s) IV Push once  dextrose 50% Injectable 25 Gram(s) IV Push once  dextrose 50% Injectable 12.5 Gram(s) IV Push once  digoxin     Tablet 0.125 milliGRAM(s) Oral daily  DULoxetine 90 milliGRAM(s) Oral daily  enoxaparin Injectable 40 milliGRAM(s) SubCutaneous daily  metolazone 2.5 milliGRAM(s) Oral daily  metoprolol succinate  milliGRAM(s) Oral daily  pantoprazole    Tablet 40 milliGRAM(s) Oral before breakfast  prasugrel 10 milliGRAM(s) Oral daily  sodium chloride 0.9%. 500 milliLiter(s) (500 mL/Hr) IV Continuous <Continuous>    PRN MEDICATIONS  dextrose 40% Gel 15 Gram(s) Oral once PRN  glucagon  Injectable 1 milliGRAM(s) IntraMuscular once PRN  morphine  - Injectable 2 milliGRAM(s) IV Push every 4 hours PRN  oxyCODONE    IR 5 milliGRAM(s) Oral every 4 hours PRN      VITAL SIGNS: Last 24 Hours  T(C): 35.8 (2020 05:11), Max: 37.3 (2020 17:15)  T(F): 96.4 (2020 05:11), Max: 99.1 (2020 17:15)  HR: 86 (2020 06:10) (79 - 87)  BP: 112/58 (2020 06:10) (105/57 - 120/66)  BP(mean): --  RR: 18 (2020 05:11) (13 - 20)  SpO2: 96% (2020 20:49) (96% - 100%)    LABS:                        10.4   5.76  )-----------( 306      ( 2020 06:23 )             34.0     02-05    135  |  96<L>  |  30<H>  ----------------------------<  245<H>  4.2   |  27  |  1.1    Ca    8.4<L>      2020 06:23  Phos  3.4     02-03  Mg     1.6     02-05    TPro  5.5<L>  /  Alb  2.6<L>  /  TBili  1.7<H>  /  DBili  x   /  AST  21  /  ALT  13  /  AlkPhos  76  02-05    PT/INR - ( 2020 20:50 )   PT: 21.30 sec;   INR: 1.85 ratio         PTT - ( 2020 20:50 )  PTT:25.0 sec  Urinalysis Basic - ( 2020 14:18 )    Color: Yellow / Appearance: Clear / S.016 / pH: x  Gluc: x / Ketone: Negative  / Bili: Negative / Urobili: <2 mg/dL   Blood: x / Protein: Trace / Nitrite: Negative   Leuk Esterase: Negative / RBC: x / WBC x   Sq Epi: x / Non Sq Epi: x / Bacteria: x      < from: 12 Lead ECG (19 @ 15:48) >  Diagnosis Line Normal sinus rhythm  Left axis deviation  Nonspecific intraventricular block  T wave abnormality, consider lateral ischemia  < end of copied text >    < from: 12 Lead ECG (20 @ 02:39) >  Diagnosis Line Sinus rhythm withPremature atrial complexes with Aberrant conduction  Left axis deviation  Left bundle branch block  Abnormal ECG  < end of copied text >      RADIOLOGY:  < from: Xray Knee 3 Views, Right (20 @ 04:00) >  Impression:  Post right knee replacement without malfunction.  No acute fracture or dislocation. Soft tissue injury.  < end of copied text >    < from: Xray Hip 2-3 Views, Right (20 @ 04:00) >  Impression:  Unchanged appearance of comminuted right hip fracture as above  < end of copied text >    < from: Xray Femur 2 Views, Right (20 @ 03:59) >  impression:  Acute intertrochanteric fracture right hip with varus angulation and displaced lesser trochanter.  Post right knee replacement.  < end of copied text >      < from: Transthoracic Echocardiogram (19 @ 10:55) >  Summary:   1. Severely decreased global left ventricular systolic function.   2. Multiple left ventricular regional wall motion abnormalities exist.   See wall motion findings.   3. Elevated left atrial and left ventricular end-diastolic pressures.   4. Spectral Doppler shows restrictive pattern of left ventricular   myocardial filling (Grade III diastolic dysfunction).   5. The LVEF by triplane Wilkerson's technique is 11%. The mean global   longitudinal peak strain by speckle tracking is -5.6% which is markedly   reduced.   6. Mild mitral valve regurgitation.   7. Mild-moderate tricuspid regurgitation.   8. Estimated pulmonary artery systolic pressure is 78.6 mmHg assuming a   right atrial pressure of 15 mmHg, which is consistent with severe   pulmonary hypertension.   9. Pulmonary hypertension is present.  10. LA volume Index is 56.9 ml/m² ml/m2.  < end of copied text >      PHYSICAL EXAM:    General: AAO x 3  HEENT: PERRLA, EOMI  CV: S1 & S2 appreciated  Lungs: CTA BL  Abdomen: + BS, Soft, NT/ND  Extremites: dressing to the right knee      ASSESSMENT & PLAN  The patient is a 64 year-old male with a PMH of CAD s/p CABG, HFrEF (11%), DM (on insulin pump), pulmonary hypertension, Celiac disease, diverticulitis, s/p right total knee replacement 2019 complicated by infection s/p abx course w/ PICC, presenting following a mechanical fall. He reports that over the last several days he has been feeling more tired; last night he felt especially tired and sat down in the chair. After then getting up to go to the kitchen, he "fell asleep standing," landing on his right buttock. He reports he had been asleep for about 20 seconds. He denies any associated loss of bladder or bowel continence, dizziness or lightheadedness, nausea or vomiting prior to or after the episode. His wife then called EMS and he presented to the ED. On presentation, vitals were T 97.8 F, HR 97, /78; trops 0.03. Right hip xray revealed an intertrochanteric fracture of the right hip, with varus angulation of the distal fragment, subtrochanteric extension with reverse obliquity and avulsion of the lesser trochanter. On admission the patient was resting comfortably; denied any chest pain, shortness of breath, dizziness, confusion, subjective fever, chills, nausea, vomiting, diarrhea. He lives w/ his wife at home; ambulates w/ a cane at baseline. (2020 08:40)      # Fall complicated by right hip fracture  WBAT to the RLE.  Hemoglobin has remained stable @ 9.4 following surgery.  The patient will eventually benefit from acute rehab    # Chronic wound to the right knee  follow-up with Burn Sx later this afternoon for management of RLE wound.    # CAD status post CABG/Chronic Systolic CHF  off IV fluids.  Continue Metolazone 2.5mg PO q24 and Metoprolol ER 150mg PO q24h.   For prior CAD status post CABG, continue ASA and Effient.  EPS recommendations were reviewed; the patient is a candidate for AICD placement.  Please consult Infectious Disease for clearance.  Allergy/immunology recommendations were reviewed; the patient is currently not a candidate for ACEI or ARB trial until beta blocker can be held.    #DMII: the patient manages his own diabetes.  Continue CHO consistent and monitor fingersticks with meals  GI prophylaxis    #Progress Note Handoff    Pending:  Consults: Burn Sx for STSG  Future Disposition: 4A FLOWER MARISCAL 64y Male  MRN#: [id number]   CODE STATUS: FULL      SUBJECTIVE  Patient is a 64y old Male who presents with a chief complaint of intertrochanteric fracture of right hip (2020 07:16)    Currently admitted to medicine with the primary diagnosis of Intertrochanteric fracture of right hip     Today is hospital day 5d, and this morning he is laying in bed comfortably and reports no overnight events. This morning pt is still complaining of discomfort while urinating but says that urinary urgency and hesitancy has resolved. Pt also states that his R leg pain has improved slightly compared to yesterday. Denies any headache, CP, SOB, N/V/D, abdominal pain or flank pain.    OBJECTIVE  PAST MEDICAL & SURGICAL HISTORY  Diabetic neuropathy  STEMI (ST elevation myocardial infarction)  Diverticulitis  MRSA bacteremia  History of celiac disease  CHF (congestive heart failure): EF ~ 25%  HTN (hypertension)  Diabetes: on insulin pump  Blood clot due to device, implant, or graft: was on blood thinners  HLD (hyperlipidemia)  Osteoarthritis  Atherosclerosis of coronary artery: CAD (coronary artery disease)  Status post percutaneous transluminal coronary angioplasty: in   Surgery, elective: Right shoulder  Surgery, elective: right knee wound debridement  S/P TKR (total knee replacement), right: with infection Mrsa   per pt he was cleared from MRSA infection  S/P CABG x 1:   Stented coronary artery: 10/18 heart attack  INFERIOR WALL MI  Other postprocedural status: Fixation hardware in foot LEFT    ALLERGIES:  ACE inhibitors (Hives)  enalapril (Hives)  rifampin (Angioedema)  vancomycin (Angioedema)    MEDICATIONS:  STANDING MEDICATIONS  acetaminophen   Tablet .. 650 milliGRAM(s) Oral every 6 hours  amoxicillin  875 milliGRAM(s)/clavulanate 1 Tablet(s) Oral two times a day  aspirin enteric coated 81 milliGRAM(s) Oral daily  atorvastatin Oral Tab/Cap - Peds 40 milliGRAM(s) Oral daily  dextrose 5%. 1000 milliLiter(s) (50 mL/Hr) IV Continuous <Continuous>  dextrose 50% Injectable 25 Gram(s) IV Push once  dextrose 50% Injectable 25 Gram(s) IV Push once  dextrose 50% Injectable 12.5 Gram(s) IV Push once  digoxin     Tablet 0.125 milliGRAM(s) Oral daily  DULoxetine 90 milliGRAM(s) Oral daily  enoxaparin Injectable 40 milliGRAM(s) SubCutaneous daily  metolazone 2.5 milliGRAM(s) Oral daily  metoprolol succinate  milliGRAM(s) Oral daily  pantoprazole    Tablet 40 milliGRAM(s) Oral before breakfast  prasugrel 10 milliGRAM(s) Oral daily  sodium chloride 0.9%. 500 milliLiter(s) (500 mL/Hr) IV Continuous <Continuous>    PRN MEDICATIONS  dextrose 40% Gel 15 Gram(s) Oral once PRN  glucagon  Injectable 1 milliGRAM(s) IntraMuscular once PRN  morphine  - Injectable 2 milliGRAM(s) IV Push every 4 hours PRN  oxyCODONE    IR 5 milliGRAM(s) Oral every 4 hours PRN      VITAL SIGNS: Last 24 Hours  T(C): 35.8 (2020 05:11), Max: 37.3 (2020 17:15)  T(F): 96.4 (2020 05:11), Max: 99.1 (2020 17:15)  HR: 86 (2020 06:10) (79 - 87)  BP: 112/58 (2020 06:10) (105/57 - 120/66)  BP(mean): --  RR: 18 (2020 05:11) (13 - 20)  SpO2: 96% (2020 20:49) (96% - 100%)    LABS:                        10.4   5.76  )-----------( 306      ( 2020 06:23 )             34.0     02-05    135  |  96<L>  |  30<H>  ----------------------------<  245<H>  4.2   |  27  |  1.1    Ca    8.4<L>      2020 06:23  Phos  3.4     02-03  Mg     1.6     02-05    TPro  5.5<L>  /  Alb  2.6<L>  /  TBili  1.7<H>  /  DBili  x   /  AST  21  /  ALT  13  /  AlkPhos  76  02-05    PT/INR - ( 2020 20:50 )   PT: 21.30 sec;   INR: 1.85 ratio         PTT - ( 2020 20:50 )  PTT:25.0 sec  Urinalysis Basic - ( 2020 14:18 )    Color: Yellow / Appearance: Clear / S.016 / pH: x  Gluc: x / Ketone: Negative  / Bili: Negative / Urobili: <2 mg/dL   Blood: x / Protein: Trace / Nitrite: Negative   Leuk Esterase: Negative / RBC: x / WBC x   Sq Epi: x / Non Sq Epi: x / Bacteria: x      < from: 12 Lead ECG (19 @ 15:48) >  Diagnosis Line Normal sinus rhythm  Left axis deviation  Nonspecific intraventricular block  T wave abnormality, consider lateral ischemia  < end of copied text >    < from: 12 Lead ECG (20 @ 02:39) >  Diagnosis Line Sinus rhythm withPremature atrial complexes with Aberrant conduction  Left axis deviation  Left bundle branch block  Abnormal ECG  < end of copied text >      RADIOLOGY:  < from: Xray Knee 3 Views, Right (20 @ 04:00) >  Impression:  Post right knee replacement without malfunction.  No acute fracture or dislocation. Soft tissue injury.  < end of copied text >    < from: Xray Hip 2-3 Views, Right (20 @ 04:00) >  Impression:  Unchanged appearance of comminuted right hip fracture as above  < end of copied text >    < from: Xray Femur 2 Views, Right (20 @ 03:59) >  impression:  Acute intertrochanteric fracture right hip with varus angulation and displaced lesser trochanter.  Post right knee replacement.  < end of copied text >      < from: Transthoracic Echocardiogram (19 @ 10:55) >  Summary:   1. Severely decreased global left ventricular systolic function.   2. Multiple left ventricular regional wall motion abnormalities exist.   See wall motion findings.   3. Elevated left atrial and left ventricular end-diastolic pressures.   4. Spectral Doppler shows restrictive pattern of left ventricular   myocardial filling (Grade III diastolic dysfunction).   5. The LVEF by triplane Wilkerson's technique is 11%. The mean global   longitudinal peak strain by speckle tracking is -5.6% which is markedly   reduced.   6. Mild mitral valve regurgitation.   7. Mild-moderate tricuspid regurgitation.   8. Estimated pulmonary artery systolic pressure is 78.6 mmHg assuming a   right atrial pressure of 15 mmHg, which is consistent with severe   pulmonary hypertension.   9. Pulmonary hypertension is present.  10. LA volume Index is 56.9 ml/m² ml/m2.  < end of copied text >      PHYSICAL EXAM:    General: AAO x 3  HEENT: PERRLA, EOMI  CV: S1 & S2 appreciated  Lungs: CTA BL  Abdomen: + BS, Soft, NT/ND  Extremites: dressing to the right knee

## 2020-02-06 LAB
ANION GAP SERPL CALC-SCNC: 10 MMOL/L — SIGNIFICANT CHANGE UP (ref 7–14)
BUN SERPL-MCNC: 38 MG/DL — HIGH (ref 10–20)
CALCIUM SERPL-MCNC: 8.9 MG/DL — SIGNIFICANT CHANGE UP (ref 8.5–10.1)
CHLORIDE SERPL-SCNC: 100 MMOL/L — SIGNIFICANT CHANGE UP (ref 98–110)
CO2 SERPL-SCNC: 28 MMOL/L — SIGNIFICANT CHANGE UP (ref 17–32)
CREAT SERPL-MCNC: 1.2 MG/DL — SIGNIFICANT CHANGE UP (ref 0.7–1.5)
GLUCOSE BLDC GLUCOMTR-MCNC: 120 MG/DL — HIGH (ref 70–99)
GLUCOSE BLDC GLUCOMTR-MCNC: 185 MG/DL — HIGH (ref 70–99)
GLUCOSE BLDC GLUCOMTR-MCNC: 68 MG/DL — LOW (ref 70–99)
GLUCOSE BLDC GLUCOMTR-MCNC: 96 MG/DL — SIGNIFICANT CHANGE UP (ref 70–99)
GLUCOSE SERPL-MCNC: 74 MG/DL — SIGNIFICANT CHANGE UP (ref 70–99)
HCT VFR BLD CALC: 31.4 % — LOW (ref 42–52)
HGB BLD-MCNC: 9.6 G/DL — LOW (ref 14–18)
MAGNESIUM SERPL-MCNC: 2 MG/DL — SIGNIFICANT CHANGE UP (ref 1.8–2.4)
MCHC RBC-ENTMCNC: 22.1 PG — LOW (ref 27–31)
MCHC RBC-ENTMCNC: 30.6 G/DL — LOW (ref 32–37)
MCV RBC AUTO: 72.2 FL — LOW (ref 80–94)
NRBC # BLD: 0 /100 WBCS — SIGNIFICANT CHANGE UP (ref 0–0)
PLATELET # BLD AUTO: 315 K/UL — SIGNIFICANT CHANGE UP (ref 130–400)
POTASSIUM SERPL-MCNC: 4.7 MMOL/L — SIGNIFICANT CHANGE UP (ref 3.5–5)
POTASSIUM SERPL-SCNC: 4.7 MMOL/L — SIGNIFICANT CHANGE UP (ref 3.5–5)
RBC # BLD: 4.35 M/UL — LOW (ref 4.7–6.1)
RBC # FLD: 19.8 % — HIGH (ref 11.5–14.5)
SODIUM SERPL-SCNC: 138 MMOL/L — SIGNIFICANT CHANGE UP (ref 135–146)
SURGICAL PATHOLOGY STUDY: SIGNIFICANT CHANGE UP
WBC # BLD: 9.87 K/UL — SIGNIFICANT CHANGE UP (ref 4.8–10.8)
WBC # FLD AUTO: 9.87 K/UL — SIGNIFICANT CHANGE UP (ref 4.8–10.8)

## 2020-02-06 PROCEDURE — 99233 SBSQ HOSP IP/OBS HIGH 50: CPT

## 2020-02-06 PROCEDURE — 76770 US EXAM ABDO BACK WALL COMP: CPT | Mod: 26

## 2020-02-06 RX ORDER — TAMSULOSIN HYDROCHLORIDE 0.4 MG/1
0.4 CAPSULE ORAL AT BEDTIME
Refills: 0 | Status: DISCONTINUED | OUTPATIENT
Start: 2020-02-06 | End: 2020-02-09

## 2020-02-06 RX ORDER — ALPRAZOLAM 0.25 MG
0.25 TABLET ORAL DAILY
Refills: 0 | Status: DISCONTINUED | OUTPATIENT
Start: 2020-02-06 | End: 2020-02-11

## 2020-02-06 RX ADMIN — TAMSULOSIN HYDROCHLORIDE 0.4 MILLIGRAM(S): 0.4 CAPSULE ORAL at 22:20

## 2020-02-06 RX ADMIN — Medication 650 MILLIGRAM(S): at 18:51

## 2020-02-06 RX ADMIN — Medication 650 MILLIGRAM(S): at 18:19

## 2020-02-06 RX ADMIN — DULOXETINE HYDROCHLORIDE 90 MILLIGRAM(S): 30 CAPSULE, DELAYED RELEASE ORAL at 12:39

## 2020-02-06 RX ADMIN — Medication 650 MILLIGRAM(S): at 01:00

## 2020-02-06 RX ADMIN — Medication 650 MILLIGRAM(S): at 12:36

## 2020-02-06 RX ADMIN — ENOXAPARIN SODIUM 40 MILLIGRAM(S): 100 INJECTION SUBCUTANEOUS at 12:40

## 2020-02-06 RX ADMIN — Medication 81 MILLIGRAM(S): at 12:36

## 2020-02-06 RX ADMIN — Medication 300 MILLIGRAM(S): at 22:20

## 2020-02-06 RX ADMIN — Medication 650 MILLIGRAM(S): at 23:51

## 2020-02-06 RX ADMIN — Medication 300 MILLIGRAM(S): at 14:49

## 2020-02-06 RX ADMIN — PRASUGREL 10 MILLIGRAM(S): 5 TABLET, FILM COATED ORAL at 12:40

## 2020-02-06 RX ADMIN — Medication 650 MILLIGRAM(S): at 13:26

## 2020-02-06 RX ADMIN — ATORVASTATIN CALCIUM 40 MILLIGRAM(S): 80 TABLET, FILM COATED ORAL at 12:38

## 2020-02-06 NOTE — PROGRESS NOTE ADULT - ASSESSMENT
63yo M with Past Medical History CAD status post CABG, Chronic Systolic CHF (11%), DM (on insulin pump), pulmonary hypertension, Celiac disease, diverticulitis, status post right total knee replacement October 2019 complicated by infection status post an extended course of IV antibiotics admitted status post mechanical fall complicated by right hip fracture.  Status post ORIF.      1.	Fall complicated by Rt hip Fx S/P ORIF  2.	Ch. Rt. Knee wound S/P selective debridement and skin graft.   3.	CAD S/P CABG  4.	Ch. HFrEF  5.	CM - EF 11%  6.	Encephalopathy due to Delirium          PLAN:    ·	Had urinary retention. Inserted Loja's catheter. About 1L urine came out.   ·	Started him on Flomax 0.4 mg po daily. Will give voiding trial after 48 hrs  ·	ID F/U noted. No abscess or cellulitis of Rt knee. No underlying hardware infection  ·	Wound vac care as per Burn  ·	ID recommended Clindamycin 300 mg po q 8h for 10 days.   ·	Will D/W the ID if the pt is cleared for AICD  ·	Not on ACE-I likely due to low BP. Will D/W the cardiology  ·	EP kimberly noted.   ·	Renal US is unremarkable.   ·	Cont his other home meds.

## 2020-02-06 NOTE — PROGRESS NOTE ADULT - SUBJECTIVE AND OBJECTIVE BOX
FLOWER MARISCAL  64y, Male    All available historical data reviewed    OVERNIGHT EVENTS:  no fevers  ROS:  General: Denies rigors, night sweats  HEENT: Denies headache, rhinorrhea, sore throat, eye pain  CV: Denies CP, palpitations  PULM: Denies wheezing, hemoptysis  GI: Denies hematemesis, hematochezia, melena  : Denies discharge, hematuria  MSK: Denies arthralgias, myalgias  SKIN: Denies rash, lesions  NEURO: Denies paresthesias, weakness  PSYCH: Denies depression, anxiety    VITALS:  T(F): 97.8, Max: 97.8 (20 @ 05:39)  HR: 69  BP: 102/61  RR: 19Vital Signs Last 24 Hrs  T(C): 36.6 (2020 05:39), Max: 36.6 (2020 05:39)  T(F): 97.8 (2020 05:39), Max: 97.8 (2020 05:39)  HR: 69 (2020 05:39) (67 - 72)  BP: 102/61 (2020 05:39) (99/56 - 110/64)  BP(mean): --  RR: 19 (2020 05:39) (19 - 20)  SpO2: 98% (2020 22:55) (98% - 98%)    TESTS & MEASUREMENTS:                        10.4   5.76  )-----------( 306      ( 2020 06:23 )             34.0     02-    135  |  96<L>  |  30<H>  ----------------------------<  245<H>  4.2   |  27  |  1.1    Ca    8.4<L>      2020 06:23  Mg     1.6     02-05    TPro  5.5<L>  /  Alb  2.6<L>  /  TBili  1.7<H>  /  DBili  x   /  AST  21  /  ALT  13  /  AlkPhos  76  02-05    LIVER FUNCTIONS - ( 2020 06:23 )  Alb: 2.6 g/dL / Pro: 5.5 g/dL / ALK PHOS: 76 U/L / ALT: 13 U/L / AST: 21 U/L / GGT: x             Urinalysis Basic - ( 2020 14:18 )    Color: Yellow / Appearance: Clear / S.016 / pH: x  Gluc: x / Ketone: Negative  / Bili: Negative / Urobili: <2 mg/dL   Blood: x / Protein: Trace / Nitrite: Negative   Leuk Esterase: Negative / RBC: x / WBC x   Sq Epi: x / Non Sq Epi: x / Bacteria: x          RADIOLOGY & ADDITIONAL TESTS:  Personal review of radiological diagnostics performed  Echo and EKG results noted when applicable.     MEDICATIONS:  acetaminophen   Tablet .. 650 milliGRAM(s) Oral every 6 hours  aspirin enteric coated 81 milliGRAM(s) Oral daily  atorvastatin Oral Tab/Cap - Peds 40 milliGRAM(s) Oral daily  clindamycin   Capsule 300 milliGRAM(s) Oral every 8 hours  dextrose 40% Gel 15 Gram(s) Oral once PRN  dextrose 5%. 1000 milliLiter(s) IV Continuous <Continuous>  dextrose 50% Injectable 25 Gram(s) IV Push once  dextrose 50% Injectable 25 Gram(s) IV Push once  dextrose 50% Injectable 12.5 Gram(s) IV Push once  digoxin     Tablet 0.125 milliGRAM(s) Oral daily  DULoxetine 90 milliGRAM(s) Oral daily  enoxaparin Injectable 40 milliGRAM(s) SubCutaneous daily  glucagon  Injectable 1 milliGRAM(s) IntraMuscular once PRN  metolazone 2.5 milliGRAM(s) Oral daily  metoprolol succinate  milliGRAM(s) Oral daily  oxyCODONE    IR 5 milliGRAM(s) Oral every 4 hours PRN  pantoprazole    Tablet 40 milliGRAM(s) Oral before breakfast  prasugrel 10 milliGRAM(s) Oral daily  sodium chloride 0.9%. 500 milliLiter(s) IV Continuous <Continuous>      ANTIBIOTICS:  clindamycin   Capsule 300 milliGRAM(s) Oral every 8 hours

## 2020-02-06 NOTE — PROGRESS NOTE ADULT - ASSESSMENT
· Assessment		  63yo M with Past Medical History CAD status post CABG, Chronic Systolic CHF (11%), DM (on insulin pump), pulmonary hypertension, Celiac disease, diverticulitis, status post right total knee replacement October 2019 complicated by infection status post an extended course of IV antibiotics admitted status post mechanical fall complicated by right hip fracture.  Status post ORIF.    IMPRESSION:  #1/4 BURN selective debridement of wound right knee, debridement and skin graft right knee, right thigh donor, wound vac.  -no abscess/cellulitis  -no infection of underlying hardware  -will maintain on po ABx  -11/22 WCx ORSA    RECOMMENDATIONS:  -po Clindamycin 300 mg q8h for total 10 days  -recall prn please

## 2020-02-06 NOTE — CHART NOTE - NSCHARTNOTEFT_GEN_A_CORE
Burn PA called to bedside by nurse due to the wound vacuum leak. Wound vac assessed and connector deemed to be detached. Connector removed and new tubing applied. Wound vacuum now showing a good seal.

## 2020-02-06 NOTE — PROGRESS NOTE ADULT - SUBJECTIVE AND OBJECTIVE BOX
Denies any chest pain,. SOB or palpitations. No orthopnea or PND.       MEDICATIONS  (STANDING):  acetaminophen   Tablet .. 650 milliGRAM(s) Oral every 6 hours  ALPRAZolam 0.25 milliGRAM(s) Oral daily  aspirin enteric coated 81 milliGRAM(s) Oral daily  atorvastatin Oral Tab/Cap - Peds 40 milliGRAM(s) Oral daily  clindamycin   Capsule 300 milliGRAM(s) Oral every 8 hours  dextrose 5%. 1000 milliLiter(s) (50 mL/Hr) IV Continuous <Continuous>  dextrose 50% Injectable 25 Gram(s) IV Push once  dextrose 50% Injectable 25 Gram(s) IV Push once  dextrose 50% Injectable 12.5 Gram(s) IV Push once  digoxin     Tablet 0.125 milliGRAM(s) Oral daily  DULoxetine 90 milliGRAM(s) Oral daily  enoxaparin Injectable 40 milliGRAM(s) SubCutaneous daily  metolazone 2.5 milliGRAM(s) Oral daily  metoprolol succinate  milliGRAM(s) Oral daily  pantoprazole    Tablet 40 milliGRAM(s) Oral before breakfast  prasugrel 10 milliGRAM(s) Oral daily  sodium chloride 0.9%. 500 milliLiter(s) (500 mL/Hr) IV Continuous <Continuous>  tamsulosin 0.4 milliGRAM(s) Oral at bedtime    MEDICATIONS  (PRN):  dextrose 40% Gel 15 Gram(s) Oral once PRN Blood Glucose LESS THAN 70 milliGRAM(s)/deciliter  glucagon  Injectable 1 milliGRAM(s) IntraMuscular once PRN Glucose LESS THAN 70 milligrams/deciliter  oxyCODONE    IR 5 milliGRAM(s) Oral every 4 hours PRN Moderate Pain (4 - 6)            Vital Signs Last 24 Hrs  T(C): 35.9 (06 Feb 2020 20:25), Max: 36.6 (06 Feb 2020 05:39)  T(F): 96.6 (06 Feb 2020 20:25), Max: 97.8 (06 Feb 2020 05:39)  HR: 73 (06 Feb 2020 20:25) (69 - 73)  BP: 105/62 (06 Feb 2020 20:25) (93/56 - 110/64)  BP(mean): --  RR: 20 (06 Feb 2020 20:25) (17 - 20)  SpO2: 97% (06 Feb 2020 14:52) (97% - 98%)     REVIEW OF SYSTEMS:  CONSTITUTIONAL: No fever, weight loss, or fatigue  CARDIOLOGY: PAtient denies chest pain, shortness of breath or syncopal episodes.   RESPIRATORY: denies shortness of breath, wheezeing.   NEUROLOGICAL: NO weakness, no focal deficits to report.  GI: no BRBPR, no N,V,diarrhea.    PSYCHIATRY: normal mood and affect  HEENT: no nasal discharge, no ecchymosis  SKIN: no ecchymosis, no breakdown  MUSCULOSKELETAL: Full range of motion x4.        PHYSICAL EXAM:  · CONSTITUTIONAL:	Well-developed, well nourished    BMI-  ·RESPIRATORY:   airway patent; + breath sounds bilaterally but decreased at the right base    · CARDIOVASCULAR	regular rate and rhythm  no rub  + YULISA   · EXTREMITIES: No cyanosis, clubbing or edema  · VASCULAR: 	Absent PT bilaterally   	  TELEMETRY: Sinus     TTE: Severe LV dysfunction     LABS:                        9.6    9.87  )-----------( 315      ( 06 Feb 2020 17:45 )             31.4     02-06    138  |  100  |  38<H>  ----------------------------<  74  4.7   |  28  |  1.2    Ca    8.9      06 Feb 2020 17:45  Mg     2.0     02-06    TPro  5.5<L>  /  Alb  2.6<L>  /  TBili  1.7<H>  /  DBili  x   /  AST  21  /  ALT  13  /  AlkPhos  76  02-05            I&O's Summary    05 Feb 2020 07:01  -  06 Feb 2020 07:00  --------------------------------------------------------  IN: 910 mL / OUT: 651 mL / NET: 259 mL    06 Feb 2020 07:01  -  06 Feb 2020 21:13  --------------------------------------------------------  IN: 200 mL / OUT: 1700 mL / NET: -1500 mL      BNP  RADIOLOGY & ADDITIONAL STUDIES:    IMPRESSION AND PLAN:      EP F/U  BivAICD  Keep negative balance  DVT prophylaxis   Allergy/Immunology   Will F/U

## 2020-02-06 NOTE — PROGRESS NOTE ADULT - ASSESSMENT
The patient is a 64 year-old male with a PMH of CAD s/p CABG, HFrEF (25%), DM (on insulin pump), pulmonary hypertension, Celiac disease, diverticulitis, s/p right total knee replacement October 2019 complicated by infection s/p abx course w/ PICC, presenting following a mechanical fall, admitted for right hip intertrochanteric fracture, found to have possible infection of right knee incision site for past TKR, s/p debridement on po abx awaiting possible biV ICD placement    # Episode of delirium night of 2/5/20 secondary to urinary retention vs. pain  - called the , pulling out wound vac  - digoxin level not elevated, patient having suprapubic pressure on exam, getting bladder scan as PCA also saying he is urinating very little at a time  - stating his pain is pretty well controlled right now, 4-5 out of 10  - will bladder scan and possibly place fields and reassess    # Mechanical fall s/p right hip intertrochanteric fracture  -Xray hip showed an intertrochanteric fracture of right hip w/ varus angulation of distal fragment; subtrochanteric extension w/ reverse obliquity and avulsion of lesser trochanter  -POD 6 s/p IMN fixation  -morphine, oxycodone PRN  -PT/rehab: possible 4a candidate    # S/p right knee total replacement complicated by infection/chronic wound s/p abx w/ PICC and NPWt  - Patient reports he had been on PO doxycycline to which he developed a reaction (edema in hands)  - clindamycin 300 po q8h as per ID  - s/p debridement of right knee, POD 3    # HFrEF   -EF 11% in December 2019  -Patient reports he was seen by Dr. Garcia who recommended a biventricular pacemaker; no intervention done as of yet as the patient with chronic wound in the right knee , EP recommendin biVICD this admission  -C/w digoxin 125 mcg PO qD,  metolazone 2.5 mg PO, metoprolol succinate  mg PO qD  - Vitals per routine  - Cardiology Dr. Lopez recs appreciated   - Allergy consult for ace/arb allergy, recommending not to have both metoprolol and ace/arb at same time, if not at the same time they recommend low dose challenge  - as per ID, no contraindication to implanting ICD now while on PO antibiotics for knee    # Dysuria  - urinalysis negative will continue to monitor    # CAD s/p CABG  -C/w aspirin 81 mg PO qD,  metoprolol succinate  mg PO qD,  prasugrel 10 mg PO qD   - atorvastatin 40 mg qhs (on zetia at home)    # DM w/ peripheral neuropathy  -On insulin pump; on Trulicity/Jardiance at home  -FS qAC, qHS; if > 180, adjust insulin pump as needed  -C/w duloxetine 90 mg PO qD    # HLD  -On Zetia at home  -c/w atorvastatin 40 mg PO qHS for now    PLAN: possible biv ICD this admission, EP will f/u    # DVT ppx- Lovenox 40 mg SQ qHS  # GI ppx- Protonix 40 mg PO qAM  # Diet- consistent carbs, gluten gliadin, DASH  # Activity- ambulate as tolerated   # Dispo- possible 4a  # Full code

## 2020-02-06 NOTE — PROGRESS NOTE ADULT - SUBJECTIVE AND OBJECTIVE BOX
FLOWER MARISCAL  64y Male    CHIEF COMPLAINT:    Patient is a 64y old  Male who presents with a chief complaint of intertrochanteric fracture of right hip (2020 10:18)      INTERVAL HPI/OVERNIGHT EVENTS:    Patient seen and examined. Was confused since last night. Had urinary retention. Complaining of Rt leg pain and lower abdominal discomfort.     ROS: All other systems are negative.    Vital Signs:    T(F): 96 (20 @ 12:40), Max: 97.8 (20 @ 05:39)  HR: 69 (20 @ 12:40) (69 - 72)  BP: 93/56 (20 @ 12:40) (93/56 - 110/64)  RR: 18 (20 @ 12:40) (18 - 20)  SpO2: 98% (20 @ 22:55) (98% - 98%)  I&O's Summary    2020 07:01  -  2020 07:00  --------------------------------------------------------  IN: 910 mL / OUT: 651 mL / NET: 259 mL    2020 07:01  -  2020 14:13  --------------------------------------------------------  IN: 0 mL / OUT: 1700 mL / NET: -1700 mL      Daily     Daily Weight in k.7 (2020 05:39)  CAPILLARY BLOOD GLUCOSE      POCT Blood Glucose.: 120 mg/dL (2020 12:02)  POCT Blood Glucose.: 185 mg/dL (2020 07:35)  POCT Blood Glucose.: 204 mg/dL (2020 22:17)  POCT Blood Glucose.: 212 mg/dL (2020 17:05)      PHYSICAL EXAM:    GENERAL:  NAD  SKIN: No rashes or lesions  HENT: Atraumatic Normocephalic. PERRL. Moist membranes.  NECK: Supple, No JVD. No lymphadenopathy.  PULMONARY: CTA B/L. No wheezing. No rales  CVS: Normal S1, S2. Rate and Rhythm are regular. No murmurs.  ABDOMEN/GI: Soft, Nontender, Nondistended; BS present  EXTREMITIES: Peripheral pulses intact. No edema B/L LE. Dressing and vac pump on Rt leg.   NEUROLOGIC:  No motor or sensory deficit.  PSYCH: Alert & oriented x 3    Consultant(s) Notes Reviewed:  [x ] YES  [ ] NO  Care Discussed with Consultants/Other Providers [ x] YES  [ ] NO    EKG reviewed  Telemetry reviewed    LABS:                        10.4   5.76  )-----------( 306      ( 2020 06:23 )             34.0     02-    135  |  96<L>  |  30<H>  ----------------------------<  245<H>  4.2   |  27  |  1.1    Ca    8.4<L>      2020 06:23  Mg     1.6     02-    TPro  5.5<L>  /  Alb  2.6<L>  /  TBili  1.7<H>  /  DBili  x   /  AST  21  /  ALT  13  /  AlkPhos  76  02-05              RADIOLOGY & ADDITIONAL TESTS:      Imaging or report Personally Reviewed:  [ ] YES  [ ] NO    Medications:  Standing  acetaminophen   Tablet .. 650 milliGRAM(s) Oral every 6 hours  aspirin enteric coated 81 milliGRAM(s) Oral daily  atorvastatin Oral Tab/Cap - Peds 40 milliGRAM(s) Oral daily  clindamycin   Capsule 300 milliGRAM(s) Oral every 8 hours  dextrose 5%. 1000 milliLiter(s) IV Continuous <Continuous>  dextrose 50% Injectable 25 Gram(s) IV Push once  dextrose 50% Injectable 25 Gram(s) IV Push once  dextrose 50% Injectable 12.5 Gram(s) IV Push once  digoxin     Tablet 0.125 milliGRAM(s) Oral daily  DULoxetine 90 milliGRAM(s) Oral daily  enoxaparin Injectable 40 milliGRAM(s) SubCutaneous daily  metolazone 2.5 milliGRAM(s) Oral daily  metoprolol succinate  milliGRAM(s) Oral daily  pantoprazole    Tablet 40 milliGRAM(s) Oral before breakfast  prasugrel 10 milliGRAM(s) Oral daily  sodium chloride 0.9%. 500 milliLiter(s) IV Continuous <Continuous>  tamsulosin 0.4 milliGRAM(s) Oral at bedtime    PRN Meds  dextrose 40% Gel 15 Gram(s) Oral once PRN  glucagon  Injectable 1 milliGRAM(s) IntraMuscular once PRN  oxyCODONE    IR 5 milliGRAM(s) Oral every 4 hours PRN      Case discussed with resident    Care discussed with pt/family

## 2020-02-06 NOTE — PROGRESS NOTE ADULT - SUBJECTIVE AND OBJECTIVE BOX
SUBJECTIVE:    Patient is a 64y old Male who presents with a chief complaint of intertrochanteric fracture of right hip (2020 08:54)      HPI:  The patient is a 64 year-old male with a PMH of CAD s/p CABG, HFrEF (11%), DM (on insulin pump), pulmonary hypertension, Celiac disease, diverticulitis, s/p right total knee replacement 2019 complicated by infection s/p abx course w/ PICC, presenting following a mechanical fall. He reports that over the last several days he has been feeling more tired; last night he felt especially tired and sat down in the chair. After then getting up to go to the kitchen, he "fell asleep standing," landing on his right buttock. He reports he had been asleep for about 20 seconds. He denies any associated loss of bladder or bowel continence, dizziness or lightheadedness, nausea or vomiting prior to or after the episode. His wife then called EMS and he presented to the ED. On presentation, vitals were T 97.8 F, HR 97, /78; trops 0.03. Right hip xray revealed an intertrochanteric fracture of the right hip, with varus angulation of the distal fragment, subtrochanteric extension with reverse obliquity and avulsion of the lesser trochanter. On admission the patient was resting comfortably; denied any chest pain, shortness of breath, dizziness, confusion, subjective fever, chills, nausea, vomiting, diarrhea. He lives w/ his wife at home; ambulates w/ a cane at baseline. (2020 08:40)      Currently admitted to medicine with the primary diagnosis of Intertrochanteric fracture of right hip     appeared delirious last night, called the  to leave, today he appears more calm but still inattentive and rambling    Besides the pertinent positives and negatives described above, the ROS was within normal limits.    PAST MEDICAL & SURGICAL HISTORY  Diabetic neuropathy  STEMI (ST elevation myocardial infarction)  Diverticulitis  MRSA bacteremia  History of celiac disease  CHF (congestive heart failure): EF ~ 25%  HTN (hypertension)  Diabetes: on insulin pump  Blood clot due to device, implant, or graft: was on blood thinners  HLD (hyperlipidemia)  Osteoarthritis  Atherosclerosis of coronary artery: CAD (coronary artery disease)  Status post percutaneous transluminal coronary angioplasty: in   Surgery, elective: Right shoulder  Surgery, elective: right knee wound debridement  S/P TKR (total knee replacement), right: with infection Mrsa   per pt he was cleared from MRSA infection  S/P CABG x 1: 2018  Stented coronary artery: 10/18 heart attack  INFERIOR WALL MI  Other postprocedural status: Fixation hardware in foot LEFT    SOCIAL HISTORY:    ALLERGIES:  ACE inhibitors (Hives)  enalapril (Hives)  rifampin (Angioedema)  vancomycin (Angioedema)    MEDICATIONS:  STANDING MEDICATIONS  acetaminophen   Tablet .. 650 milliGRAM(s) Oral every 6 hours  aspirin enteric coated 81 milliGRAM(s) Oral daily  atorvastatin Oral Tab/Cap - Peds 40 milliGRAM(s) Oral daily  clindamycin   Capsule 300 milliGRAM(s) Oral every 8 hours  dextrose 5%. 1000 milliLiter(s) IV Continuous <Continuous>  dextrose 50% Injectable 25 Gram(s) IV Push once  dextrose 50% Injectable 25 Gram(s) IV Push once  dextrose 50% Injectable 12.5 Gram(s) IV Push once  digoxin     Tablet 0.125 milliGRAM(s) Oral daily  DULoxetine 90 milliGRAM(s) Oral daily  enoxaparin Injectable 40 milliGRAM(s) SubCutaneous daily  metolazone 2.5 milliGRAM(s) Oral daily  metoprolol succinate  milliGRAM(s) Oral daily  pantoprazole    Tablet 40 milliGRAM(s) Oral before breakfast  prasugrel 10 milliGRAM(s) Oral daily  sodium chloride 0.9%. 500 milliLiter(s) IV Continuous <Continuous>    PRN MEDICATIONS  dextrose 40% Gel 15 Gram(s) Oral once PRN  glucagon  Injectable 1 milliGRAM(s) IntraMuscular once PRN  oxyCODONE    IR 5 milliGRAM(s) Oral every 4 hours PRN    VITALS:   T(F): 97.8  HR: 69  BP: 102/61  RR: 19  SpO2: 98%    LABS:                        10.4   5.76  )-----------( 306      ( 2020 06:23 )             34.0     02-    135  |  96<L>  |  30<H>  ----------------------------<  245<H>  4.2   |  27  |  1.1    Ca    8.4<L>      2020 06:23  Mg     1.6     02-05    TPro  5.5<L>  /  Alb  2.6<L>  /  TBili  1.7<H>  /  DBili  x   /  AST  21  /  ALT  13  /  AlkPhos  76        Urinalysis Basic - ( 2020 14:18 )    Color: Yellow / Appearance: Clear / S.016 / pH: x  Gluc: x / Ketone: Negative  / Bili: Negative / Urobili: <2 mg/dL   Blood: x / Protein: Trace / Nitrite: Negative   Leuk Esterase: Negative / RBC: x / WBC x   Sq Epi: x / Non Sq Epi: x / Bacteria: x                RADIOLOGY:    PHYSICAL EXAM:  GEN: No acute distress  LUNGS: Clear to auscultation bilaterally   HEART: Regular  ABD: Soft, non-tender, non-distended. having some suprapubic pressure  EXT: right knee wound vac  NEURO: AAOX3    Intravenous access: yes  NG tube: no  Loja Catheter: no

## 2020-02-07 LAB
ALBUMIN SERPL ELPH-MCNC: 3 G/DL — LOW (ref 3.5–5.2)
ALP SERPL-CCNC: 79 U/L — SIGNIFICANT CHANGE UP (ref 30–115)
ALT FLD-CCNC: 14 U/L — SIGNIFICANT CHANGE UP (ref 0–41)
ANION GAP SERPL CALC-SCNC: 13 MMOL/L — SIGNIFICANT CHANGE UP (ref 7–14)
AST SERPL-CCNC: 24 U/L — SIGNIFICANT CHANGE UP (ref 0–41)
BASOPHILS # BLD AUTO: 0.04 K/UL — SIGNIFICANT CHANGE UP (ref 0–0.2)
BASOPHILS NFR BLD AUTO: 0.5 % — SIGNIFICANT CHANGE UP (ref 0–1)
BILIRUB SERPL-MCNC: 1.8 MG/DL — HIGH (ref 0.2–1.2)
BUN SERPL-MCNC: 35 MG/DL — HIGH (ref 10–20)
CALCIUM SERPL-MCNC: 8.5 MG/DL — SIGNIFICANT CHANGE UP (ref 8.5–10.1)
CHLORIDE SERPL-SCNC: 97 MMOL/L — LOW (ref 98–110)
CO2 SERPL-SCNC: 28 MMOL/L — SIGNIFICANT CHANGE UP (ref 17–32)
CREAT SERPL-MCNC: 1.1 MG/DL — SIGNIFICANT CHANGE UP (ref 0.7–1.5)
EOSINOPHIL # BLD AUTO: 0.17 K/UL — SIGNIFICANT CHANGE UP (ref 0–0.7)
EOSINOPHIL NFR BLD AUTO: 2.1 % — SIGNIFICANT CHANGE UP (ref 0–8)
GLUCOSE BLDC GLUCOMTR-MCNC: 111 MG/DL — HIGH (ref 70–99)
GLUCOSE BLDC GLUCOMTR-MCNC: 160 MG/DL — HIGH (ref 70–99)
GLUCOSE BLDC GLUCOMTR-MCNC: 74 MG/DL — SIGNIFICANT CHANGE UP (ref 70–99)
GLUCOSE BLDC GLUCOMTR-MCNC: 75 MG/DL — SIGNIFICANT CHANGE UP (ref 70–99)
GLUCOSE BLDC GLUCOMTR-MCNC: 81 MG/DL — SIGNIFICANT CHANGE UP (ref 70–99)
GLUCOSE BLDC GLUCOMTR-MCNC: 91 MG/DL — SIGNIFICANT CHANGE UP (ref 70–99)
GLUCOSE SERPL-MCNC: 55 MG/DL — LOW (ref 70–99)
HCT VFR BLD CALC: 30.2 % — LOW (ref 42–52)
HGB BLD-MCNC: 9 G/DL — LOW (ref 14–18)
IMM GRANULOCYTES NFR BLD AUTO: 0.2 % — SIGNIFICANT CHANGE UP (ref 0.1–0.3)
LYMPHOCYTES # BLD AUTO: 1.04 K/UL — LOW (ref 1.2–3.4)
LYMPHOCYTES # BLD AUTO: 12.6 % — LOW (ref 20.5–51.1)
MAGNESIUM SERPL-MCNC: 1.9 MG/DL — SIGNIFICANT CHANGE UP (ref 1.8–2.4)
MCHC RBC-ENTMCNC: 21.1 PG — LOW (ref 27–31)
MCHC RBC-ENTMCNC: 29.8 G/DL — LOW (ref 32–37)
MCV RBC AUTO: 70.7 FL — LOW (ref 80–94)
MONOCYTES # BLD AUTO: 1.33 K/UL — HIGH (ref 0.1–0.6)
MONOCYTES NFR BLD AUTO: 16.1 % — HIGH (ref 1.7–9.3)
NEUTROPHILS # BLD AUTO: 5.64 K/UL — SIGNIFICANT CHANGE UP (ref 1.4–6.5)
NEUTROPHILS NFR BLD AUTO: 68.5 % — SIGNIFICANT CHANGE UP (ref 42.2–75.2)
NRBC # BLD: 0 /100 WBCS — SIGNIFICANT CHANGE UP (ref 0–0)
PLATELET # BLD AUTO: 300 K/UL — SIGNIFICANT CHANGE UP (ref 130–400)
POTASSIUM SERPL-MCNC: 4.4 MMOL/L — SIGNIFICANT CHANGE UP (ref 3.5–5)
POTASSIUM SERPL-SCNC: 4.4 MMOL/L — SIGNIFICANT CHANGE UP (ref 3.5–5)
PROT SERPL-MCNC: 5.7 G/DL — LOW (ref 6–8)
RBC # BLD: 4.27 M/UL — LOW (ref 4.7–6.1)
RBC # FLD: 19.8 % — HIGH (ref 11.5–14.5)
SODIUM SERPL-SCNC: 138 MMOL/L — SIGNIFICANT CHANGE UP (ref 135–146)
WBC # BLD: 8.24 K/UL — SIGNIFICANT CHANGE UP (ref 4.8–10.8)
WBC # FLD AUTO: 8.24 K/UL — SIGNIFICANT CHANGE UP (ref 4.8–10.8)

## 2020-02-07 PROCEDURE — 99233 SBSQ HOSP IP/OBS HIGH 50: CPT

## 2020-02-07 RX ORDER — CHLORHEXIDINE GLUCONATE 213 G/1000ML
1 SOLUTION TOPICAL
Refills: 0 | Status: DISCONTINUED | OUTPATIENT
Start: 2020-02-07 | End: 2020-02-11

## 2020-02-07 RX ORDER — ATORVASTATIN CALCIUM 80 MG/1
40 TABLET, FILM COATED ORAL AT BEDTIME
Refills: 0 | Status: DISCONTINUED | OUTPATIENT
Start: 2020-02-07 | End: 2020-02-11

## 2020-02-07 RX ORDER — TAMSULOSIN HYDROCHLORIDE 0.4 MG/1
0.4 CAPSULE ORAL AT BEDTIME
Refills: 0 | Status: DISCONTINUED | OUTPATIENT
Start: 2020-02-07 | End: 2020-02-11

## 2020-02-07 RX ADMIN — PANTOPRAZOLE SODIUM 40 MILLIGRAM(S): 20 TABLET, DELAYED RELEASE ORAL at 06:03

## 2020-02-07 RX ADMIN — Medication 300 MILLIGRAM(S): at 06:01

## 2020-02-07 RX ADMIN — Medication 650 MILLIGRAM(S): at 12:44

## 2020-02-07 RX ADMIN — PRASUGREL 10 MILLIGRAM(S): 5 TABLET, FILM COATED ORAL at 12:46

## 2020-02-07 RX ADMIN — Medication 650 MILLIGRAM(S): at 18:16

## 2020-02-07 RX ADMIN — ENOXAPARIN SODIUM 40 MILLIGRAM(S): 100 INJECTION SUBCUTANEOUS at 12:45

## 2020-02-07 RX ADMIN — Medication 150 MILLIGRAM(S): at 06:02

## 2020-02-07 RX ADMIN — Medication 650 MILLIGRAM(S): at 14:23

## 2020-02-07 RX ADMIN — Medication 650 MILLIGRAM(S): at 06:01

## 2020-02-07 RX ADMIN — Medication 650 MILLIGRAM(S): at 19:11

## 2020-02-07 RX ADMIN — OXYCODONE HYDROCHLORIDE 5 MILLIGRAM(S): 5 TABLET ORAL at 09:21

## 2020-02-07 RX ADMIN — Medication 650 MILLIGRAM(S): at 06:37

## 2020-02-07 RX ADMIN — Medication 300 MILLIGRAM(S): at 22:14

## 2020-02-07 RX ADMIN — Medication 81 MILLIGRAM(S): at 12:44

## 2020-02-07 RX ADMIN — OXYCODONE HYDROCHLORIDE 5 MILLIGRAM(S): 5 TABLET ORAL at 06:38

## 2020-02-07 RX ADMIN — TAMSULOSIN HYDROCHLORIDE 0.4 MILLIGRAM(S): 0.4 CAPSULE ORAL at 22:14

## 2020-02-07 RX ADMIN — Medication 300 MILLIGRAM(S): at 13:20

## 2020-02-07 RX ADMIN — CHLORHEXIDINE GLUCONATE 1 APPLICATION(S): 213 SOLUTION TOPICAL at 12:51

## 2020-02-07 RX ADMIN — DULOXETINE HYDROCHLORIDE 90 MILLIGRAM(S): 30 CAPSULE, DELAYED RELEASE ORAL at 12:45

## 2020-02-07 RX ADMIN — ATORVASTATIN CALCIUM 40 MILLIGRAM(S): 80 TABLET, FILM COATED ORAL at 22:14

## 2020-02-07 RX ADMIN — Medication 650 MILLIGRAM(S): at 01:34

## 2020-02-07 NOTE — PROGRESS NOTE ADULT - ASSESSMENT
65yo M with Past Medical History CAD status post CABG, Chronic Systolic CHF (11%), DM (on insulin pump), pulmonary hypertension, Celiac disease, diverticulitis, status post right total knee replacement October 2019 complicated by infection status post an extended course of IV antibiotics admitted status post mechanical fall complicated by right hip fracture.  Status post ORIF.      1.	Fall complicated by Rt hip Fx S/P ORIF  2.	Ch. Rt. Knee wound S/P selective debridement and skin graft.   3.	CAD S/P CABG  4.	Ch. HFrEF  5.	CM - EF 11%  6.	Urinary retention  7.	Encephalopathy due to Delirium          PLAN:    ·	S/P Loja's due to urinary retention. Voiding trial tomorrow.   ·	ID F/U noted. No abscess or cellulitis of Rt knee. No underlying hardware infection  ·	Wound vac care as per Burn  ·	ID recommended Clindamycin 300 mg po q 8h for 10 days.   ·	Cardiology F/U noted. Recommended EP F/U  ·	EP F/U for AICD placement  ·	Not on ACE-I likely due to low BP.   ·	No narcotics as the pt becomes confused.    ·	Renal US is unremarkable.   ·	Cont his other home meds.     Progress note Hand off:    PENDING: EP F/U and decision about AICD  Disposition: SNF

## 2020-02-07 NOTE — PROGRESS NOTE ADULT - ASSESSMENT
64 year-old male with a PMH of CAD s/p CABG, HFrEF (25%), DM (on insulin pump), pulmonary hypertension, Celiac disease, diverticulitis, s/p right total knee replacement October 2019 complicated by infection s/p abx course w/ PICC, presenting following a mechanical fall, admitted for right hip intertrochanteric fracture, found to have possible infection of right knee incision site for past TKR, s/p debridement + wound vac placement on po abx awaiting possible biV ICD placement by ep     # Episode of delirium night of 2/5/20 secondary to urinary retention vs. pain  - called the , pulling out wound vac  - digoxin level not elevated, patient having suprapubic pressure on exam, getting bladder scan as PCA also saying he is urinating very little at a time  - stating his pain is pretty well controlled right now, 4-5 out of 10  - will bladder scan and possibly place fields and reassess    # Mechanical fall s/p right hip intertrochanteric fracture  -Xray hip showed an intertrochanteric fracture of right hip w/ varus angulation of distal fragment; subtrochanteric extension w/ reverse obliquity and avulsion of lesser trochanter  -morphine, oxycodone PRN  -PT/rehab: possible 4a candidate    # S/p right knee total replacement complicated by infection/chronic wound s/p abx w/ PICC and NPWt  - Patient reports he had been on PO doxycycline to which he developed a reaction (edema in hands)  - clindamycin 300 po q8h as per ID  - s/p debridement of right knee, POD 4    # HFrEF   -EF 11% in December 2019  -Patient reports he was seen by Dr. Garcia who recommended a biventricular pacemaker; no intervention done as of yet as the patient with chronic wound in the right knee , EP recommending  biVICD this admission  -C/w digoxin 125 mcg PO qD,  metolazone 2.5 mg PO, metoprolol succinate  mg PO qD  - Vitals per routine  - Cardiology Dr. Lopez recs appreciated   - as per ID, no contraindication to implanting ICD now while on PO antibiotics for knee    # CAD s/p CABG  -C/w aspirin 81 mg PO qD,  metoprolol succinate  mg PO qD,  prasugrel 10 mg PO qD   - atorvastatin 40 mg qhs (on zetia at home)    # DM w/ peripheral neuropathy  -On insulin pump; on Trulicity/Jardiance at home  -FS qAC, qHS; if > 180, adjust insulin pump as needed  -C/w duloxetine 90 mg PO qD    # HLD  -On Zetia at home  -c/w atorvastatin 40 mg PO qHS for now    PLAN: possible biv ICD this admission, EP will f/u    # DVT ppx- Lovenox 40 mg SQ qHS  # GI ppx- Protonix 40 mg PO qAM  # Diet- consistent carbs, gluten gliadin, DASH  # Activity- ambulate as tolerated   # Dispo- possible 4a  # Full code  # pending EP decision

## 2020-02-07 NOTE — PROGRESS NOTE ADULT - SUBJECTIVE AND OBJECTIVE BOX
FLOWER MARISCAL  64y Male    CHIEF COMPLAINT:    Patient is a 64y old  Male who presents with a chief complaint of intertrochanteric fracture of right hip (2020 11:04)      INTERVAL HPI/OVERNIGHT EVENTS:    Patient seen and examined. C/O pain in his Rt leg. Became confused after taking oxycodone. No fever.     ROS: All other systems are negative.    Vital Signs:    T(F): 96.6 (20 @ 14:18), Max: 97 (20 @ 05:36)  HR: 72 (20 @ 14:18) (72 - 74)  BP: 94/55 (20 @ 14:18) (94/55 - 105/62)  RR: 19 (20 @ 14:18) (19 - 20)  SpO2: 96% (20 @ 08:00) (96% - 96%)  I&O's Summary    2020 07:01  -  2020 07:00  --------------------------------------------------------  IN: 690 mL / OUT: 2500 mL / NET: -1810 mL    2020 07:01  -  2020 15:16  --------------------------------------------------------  IN: 340 mL / OUT: 0 mL / NET: 340 mL      Daily     Daily Weight in k.5 (2020 05:36)  CAPILLARY BLOOD GLUCOSE      POCT Blood Glucose.: 160 mg/dL (2020 11:57)  POCT Blood Glucose.: 91 mg/dL (2020 09:40)  POCT Blood Glucose.: 75 mg/dL (2020 08:09)  POCT Blood Glucose.: 68 mg/dL (2020 21:40)  POCT Blood Glucose.: 96 mg/dL (2020 16:41)      PHYSICAL EXAM:    GENERAL:  NAD  SKIN: No rashes or lesions  HENT: Atraumatic Normocephalic. PERRL. Moist membranes.  NECK: Supple, No JVD. No lymphadenopathy.  PULMONARY: CTA B/L. No wheezing. No rales  CVS: Normal S1, S2. Rate and Rhythm are regular. No murmurs.  ABDOMEN/GI: Soft, Nontender, Nondistended; BS present  EXTREMITIES: Peripheral pulses intact. No edema B/L LE. Dressing and wound vac on Rt leg  NEUROLOGIC:  No motor or sensory deficit.  PSYCH: Alert & oriented x 2    Consultant(s) Notes Reviewed:  [x ] YES  [ ] NO  Care Discussed with Consultants/Other Providers [ x] YES  [ ] NO    EKG reviewed  Telemetry reviewed    LABS:                        9.0    8.24  )-----------( 300      ( 2020 05:17 )             30.2     02-07    138  |  97<L>  |  35<H>  ----------------------------<  55<L>  4.4   |  28  |  1.1    Ca    8.5      2020 05:17  Mg     1.9     02-07    TPro  5.7<L>  /  Alb  3.0<L>  /  TBili  1.8<H>  /  DBili  x   /  AST  24  /  ALT  14  /  AlkPhos  79  02-07            Culture - Surgical Swab (collected 2020 15:20)  Source: .Surgical Swab SOURCE: RIGHT LEG EXTREMITY  Preliminary Report (2020 15:07):    Numerous Serratia marcescens    Moderate Escherichia coli    Rare Gram Positive Cocci in Clusters  Organism: Serratia marcescens  Escherichia coli (2020 15:15)  Organism: Escherichia coli (2020 15:15)  Organism: Serratia marcescens (2020 15:14)        RADIOLOGY & ADDITIONAL TESTS:      Imaging or report Personally Reviewed:  [ ] YES  [ ] NO    Medications:  Standing  acetaminophen   Tablet .. 650 milliGRAM(s) Oral every 6 hours  ALPRAZolam 0.25 milliGRAM(s) Oral daily  aspirin enteric coated 81 milliGRAM(s) Oral daily  atorvastatin 40 milliGRAM(s) Oral at bedtime  chlorhexidine 4% Liquid 1 Application(s) Topical <User Schedule>  clindamycin   Capsule 300 milliGRAM(s) Oral every 8 hours  dextrose 5%. 1000 milliLiter(s) IV Continuous <Continuous>  dextrose 50% Injectable 25 Gram(s) IV Push once  dextrose 50% Injectable 25 Gram(s) IV Push once  dextrose 50% Injectable 12.5 Gram(s) IV Push once  digoxin     Tablet 0.125 milliGRAM(s) Oral daily  DULoxetine 90 milliGRAM(s) Oral daily  enoxaparin Injectable 40 milliGRAM(s) SubCutaneous daily  metolazone 2.5 milliGRAM(s) Oral daily  metoprolol succinate  milliGRAM(s) Oral daily  pantoprazole    Tablet 40 milliGRAM(s) Oral before breakfast  prasugrel 10 milliGRAM(s) Oral daily  sodium chloride 0.9%. 500 milliLiter(s) IV Continuous <Continuous>  tamsulosin 0.4 milliGRAM(s) Oral at bedtime  tamsulosin 0.4 milliGRAM(s) Oral at bedtime    PRN Meds  dextrose 40% Gel 15 Gram(s) Oral once PRN  glucagon  Injectable 1 milliGRAM(s) IntraMuscular once PRN      Case discussed with resident    Care discussed with pt/family

## 2020-02-08 LAB
ALBUMIN SERPL ELPH-MCNC: 2.9 G/DL — LOW (ref 3.5–5.2)
ALP SERPL-CCNC: 96 U/L — SIGNIFICANT CHANGE UP (ref 30–115)
ALT FLD-CCNC: 17 U/L — SIGNIFICANT CHANGE UP (ref 0–41)
ANION GAP SERPL CALC-SCNC: 14 MMOL/L — SIGNIFICANT CHANGE UP (ref 7–14)
AST SERPL-CCNC: 28 U/L — SIGNIFICANT CHANGE UP (ref 0–41)
BASOPHILS # BLD AUTO: 0.04 K/UL — SIGNIFICANT CHANGE UP (ref 0–0.2)
BASOPHILS NFR BLD AUTO: 0.4 % — SIGNIFICANT CHANGE UP (ref 0–1)
BILIRUB SERPL-MCNC: 2.4 MG/DL — HIGH (ref 0.2–1.2)
BUN SERPL-MCNC: 32 MG/DL — HIGH (ref 10–20)
CALCIUM SERPL-MCNC: 8.8 MG/DL — SIGNIFICANT CHANGE UP (ref 8.5–10.1)
CHLORIDE SERPL-SCNC: 97 MMOL/L — LOW (ref 98–110)
CO2 SERPL-SCNC: 25 MMOL/L — SIGNIFICANT CHANGE UP (ref 17–32)
CREAT SERPL-MCNC: 1.1 MG/DL — SIGNIFICANT CHANGE UP (ref 0.7–1.5)
EOSINOPHIL # BLD AUTO: 0.08 K/UL — SIGNIFICANT CHANGE UP (ref 0–0.7)
EOSINOPHIL NFR BLD AUTO: 0.8 % — SIGNIFICANT CHANGE UP (ref 0–8)
GLUCOSE BLDC GLUCOMTR-MCNC: 174 MG/DL — HIGH (ref 70–99)
GLUCOSE BLDC GLUCOMTR-MCNC: 184 MG/DL — HIGH (ref 70–99)
GLUCOSE BLDC GLUCOMTR-MCNC: 204 MG/DL — HIGH (ref 70–99)
GLUCOSE SERPL-MCNC: 184 MG/DL — HIGH (ref 70–99)
HCT VFR BLD CALC: 32.2 % — LOW (ref 42–52)
HGB BLD-MCNC: 9.7 G/DL — LOW (ref 14–18)
IMM GRANULOCYTES NFR BLD AUTO: 0.4 % — HIGH (ref 0.1–0.3)
LYMPHOCYTES # BLD AUTO: 0.72 K/UL — LOW (ref 1.2–3.4)
LYMPHOCYTES # BLD AUTO: 7.1 % — LOW (ref 20.5–51.1)
MAGNESIUM SERPL-MCNC: 1.7 MG/DL — LOW (ref 1.8–2.4)
MCHC RBC-ENTMCNC: 21.3 PG — LOW (ref 27–31)
MCHC RBC-ENTMCNC: 30.1 G/DL — LOW (ref 32–37)
MCV RBC AUTO: 70.6 FL — LOW (ref 80–94)
MONOCYTES # BLD AUTO: 1.25 K/UL — HIGH (ref 0.1–0.6)
MONOCYTES NFR BLD AUTO: 12.3 % — HIGH (ref 1.7–9.3)
NEUTROPHILS # BLD AUTO: 8.06 K/UL — HIGH (ref 1.4–6.5)
NEUTROPHILS NFR BLD AUTO: 79 % — HIGH (ref 42.2–75.2)
NRBC # BLD: 0 /100 WBCS — SIGNIFICANT CHANGE UP (ref 0–0)
PLATELET # BLD AUTO: 329 K/UL — SIGNIFICANT CHANGE UP (ref 130–400)
POTASSIUM SERPL-MCNC: 5.1 MMOL/L — HIGH (ref 3.5–5)
POTASSIUM SERPL-SCNC: 5.1 MMOL/L — HIGH (ref 3.5–5)
PROT SERPL-MCNC: 5.7 G/DL — LOW (ref 6–8)
RBC # BLD: 4.56 M/UL — LOW (ref 4.7–6.1)
RBC # FLD: 19.7 % — HIGH (ref 11.5–14.5)
SODIUM SERPL-SCNC: 136 MMOL/L — SIGNIFICANT CHANGE UP (ref 135–146)
WBC # BLD: 10.19 K/UL — SIGNIFICANT CHANGE UP (ref 4.8–10.8)
WBC # FLD AUTO: 10.19 K/UL — SIGNIFICANT CHANGE UP (ref 4.8–10.8)

## 2020-02-08 PROCEDURE — 99024 POSTOP FOLLOW-UP VISIT: CPT

## 2020-02-08 PROCEDURE — 99233 SBSQ HOSP IP/OBS HIGH 50: CPT

## 2020-02-08 RX ORDER — MAGNESIUM SULFATE 500 MG/ML
2 VIAL (ML) INJECTION ONCE
Refills: 0 | Status: COMPLETED | OUTPATIENT
Start: 2020-02-08 | End: 2020-02-08

## 2020-02-08 RX ORDER — MORPHINE SULFATE 50 MG/1
4 CAPSULE, EXTENDED RELEASE ORAL ONCE
Refills: 0 | Status: DISCONTINUED | OUTPATIENT
Start: 2020-02-08 | End: 2020-02-08

## 2020-02-08 RX ADMIN — Medication 30 MILLILITER(S): at 22:55

## 2020-02-08 RX ADMIN — ENOXAPARIN SODIUM 40 MILLIGRAM(S): 100 INJECTION SUBCUTANEOUS at 12:04

## 2020-02-08 RX ADMIN — Medication 300 MILLIGRAM(S): at 05:40

## 2020-02-08 RX ADMIN — Medication 650 MILLIGRAM(S): at 17:31

## 2020-02-08 RX ADMIN — Medication 650 MILLIGRAM(S): at 12:02

## 2020-02-08 RX ADMIN — Medication 300 MILLIGRAM(S): at 21:44

## 2020-02-08 RX ADMIN — Medication 81 MILLIGRAM(S): at 12:02

## 2020-02-08 RX ADMIN — DULOXETINE HYDROCHLORIDE 90 MILLIGRAM(S): 30 CAPSULE, DELAYED RELEASE ORAL at 12:04

## 2020-02-08 RX ADMIN — PANTOPRAZOLE SODIUM 40 MILLIGRAM(S): 20 TABLET, DELAYED RELEASE ORAL at 05:40

## 2020-02-08 RX ADMIN — Medication 0.25 MILLIGRAM(S): at 12:09

## 2020-02-08 RX ADMIN — ATORVASTATIN CALCIUM 40 MILLIGRAM(S): 80 TABLET, FILM COATED ORAL at 21:44

## 2020-02-08 RX ADMIN — Medication 650 MILLIGRAM(S): at 05:40

## 2020-02-08 RX ADMIN — Medication 300 MILLIGRAM(S): at 13:21

## 2020-02-08 RX ADMIN — PRASUGREL 10 MILLIGRAM(S): 5 TABLET, FILM COATED ORAL at 13:21

## 2020-02-08 RX ADMIN — Medication 150 MILLIGRAM(S): at 05:40

## 2020-02-08 RX ADMIN — Medication 650 MILLIGRAM(S): at 18:16

## 2020-02-08 RX ADMIN — Medication 50 GRAM(S): at 12:10

## 2020-02-08 RX ADMIN — CHLORHEXIDINE GLUCONATE 1 APPLICATION(S): 213 SOLUTION TOPICAL at 05:41

## 2020-02-08 RX ADMIN — Medication 650 MILLIGRAM(S): at 12:29

## 2020-02-08 RX ADMIN — TAMSULOSIN HYDROCHLORIDE 0.4 MILLIGRAM(S): 0.4 CAPSULE ORAL at 21:44

## 2020-02-08 RX ADMIN — Medication 0.12 MILLIGRAM(S): at 05:40

## 2020-02-08 NOTE — PROGRESS NOTE ADULT - ASSESSMENT
: right knee dressing change--> good take skin graft    will d/c staple in am, pt may ambulate if ok with ortho

## 2020-02-08 NOTE — PROGRESS NOTE ADULT - ASSESSMENT
64 year-old male with a PMH of CAD s/p CABG, HFrEF (25%), DM (on insulin pump), pulmonary hypertension, Celiac disease, diverticulitis, s/p right total knee replacement October 2019 complicated by infection s/p abx course w/ PICC, presenting following a mechanical fall, admitted for right hip intertrochanteric fracture, found to have possible infection of right knee incision site for past TKR, s/p debridement + wound vac placement on po abx awaiting possible biV ICD placement by ep     # Mechanical fall s/p right hip intertrochanteric fracture  -Xray hip showed an intertrochanteric fracture of right hip w/ varus angulation of distal fragment; subtrochanteric extension w/ reverse obliquity and avulsion of lesser trochanter  -avoid oxycodone and all other narcotics in view of a hx of drowsiness and disorientation in the past  -PT/rehab: possible 4a candidate    # S/p right knee total replacement in 2019 complicated by SSI  - s/p debridement of right knee, POD 5  - keep on clindamycin for 10 days  - f/u ID recommendations    # HFrEF   -EF 11% in December 2019  -EP recommending  biVICD this admission next week  -C/w digoxin 125 mcg PO qD,  metolazone 2.5 mg PO, metoprolol succinate  mg PO qD  - as per ID, no contraindication to implanting ICD now while on PO antibiotics    # CAD s/p CABG  -C/w aspirin 81 mg PO qD,  metoprolol succinate  mg PO qD,  prasugrel 10 mg PO qD   - atorvastatin 40 mg qhs (on zetia at home)    # DM w/ peripheral neuropathy  -On insulin pump; on Trulicity/Jardiance at home  -FS qAC, qHS; if > 180, adjust insulin pump as needed  -C/w duloxetine 90 mg PO qD    # HLD  -On Zetia at home  -c/w atorvastatin 40 mg PO qHS for now    PLAN: possible biv ICD this admission, EP will f/u    # DVT ppx- Lovenox 40 mg SQ qHS  # GI ppx- Protonix 40 mg PO qAM  # Diet- consistent carbs, gluten gliadin, DASH  # Activity- ambulate as tolerated   # Dispo- possible 4a  # Full code  # pending CRTD next week

## 2020-02-08 NOTE — PROGRESS NOTE ADULT - SUBJECTIVE AND OBJECTIVE BOX
Feeling better. Denies any chest pain, SOB or plapitations. No orthopnea or PND.   S/P skin graft to right knee wound.   Afebrile awaiting BivAICD      MEDICATIONS  (STANDING):  acetaminophen   Tablet .. 650 milliGRAM(s) Oral every 6 hours  ALPRAZolam 0.25 milliGRAM(s) Oral daily  aspirin enteric coated 81 milliGRAM(s) Oral daily  atorvastatin 40 milliGRAM(s) Oral at bedtime  chlorhexidine 4% Liquid 1 Application(s) Topical <User Schedule>  clindamycin   Capsule 300 milliGRAM(s) Oral every 8 hours  dextrose 5%. 1000 milliLiter(s) (50 mL/Hr) IV Continuous <Continuous>  dextrose 50% Injectable 25 Gram(s) IV Push once  dextrose 50% Injectable 25 Gram(s) IV Push once  dextrose 50% Injectable 12.5 Gram(s) IV Push once  digoxin     Tablet 0.125 milliGRAM(s) Oral daily  DULoxetine 90 milliGRAM(s) Oral daily  enoxaparin Injectable 40 milliGRAM(s) SubCutaneous daily  metolazone 2.5 milliGRAM(s) Oral daily  metoprolol succinate  milliGRAM(s) Oral daily  morphine  - Injectable 4 milliGRAM(s) IV Push once  pantoprazole    Tablet 40 milliGRAM(s) Oral before breakfast  prasugrel 10 milliGRAM(s) Oral daily  sodium chloride 0.9%. 500 milliLiter(s) (500 mL/Hr) IV Continuous <Continuous>  tamsulosin 0.4 milliGRAM(s) Oral at bedtime  tamsulosin 0.4 milliGRAM(s) Oral at bedtime    MEDICATIONS  (PRN):  dextrose 40% Gel 15 Gram(s) Oral once PRN Blood Glucose LESS THAN 70 milliGRAM(s)/deciliter  glucagon  Injectable 1 milliGRAM(s) IntraMuscular once PRN Glucose LESS THAN 70 milligrams/deciliter            Vital Signs Last 24 Hrs  T(C): 36.4 (08 Feb 2020 13:24), Max: 36.4 (08 Feb 2020 13:24)  T(F): 97.6 (08 Feb 2020 13:24), Max: 97.6 (08 Feb 2020 13:24)  HR: 70 (08 Feb 2020 13:24) (70 - 77)  BP: 99/55 (08 Feb 2020 13:24) (95/62 - 107/57)  BP(mean): --  RR: 18 (08 Feb 2020 13:24) (18 - 18)  SpO2: 95% (08 Feb 2020 08:51) (95% - 99%)     REVIEW OF SYSTEMS:  CONSTITUTIONAL: No fever, weight loss, or fatigue  CARDIOLOGY: Patient denies chest pain, shortness of breath or syncopal episodes.   RESPIRATORY: denies shortness of breath, wheezeing.   NEUROLOGICAL: NO weakness, no focal deficits to report.  GI: no BRBPR, no N,V,diarrhea.    PSYCHIATRY: normal mood and affect  HEENT: no nasal discharge, no ecchymosis  SKIN: no ecchymosis, no breakdown  MUSCULOSKELETAL: Full range of motion x4.        PHYSICAL EXAM:  · CONSTITUTIONAL:	Well-developed, well nourished    BMI-  ·RESPIRATORY:   airway patent; breath sounds equal; good air movement; respirations non-labored; clear to auscultation bilaterally; no chest wall tenderness; no intercostal retractions; no rales,rhonchi or wheeze  · CARDIOVASCULAR	regular rate and rhythm  no rub  no murmur  normal PMI  · EXTREMITIES: No cyanosis, clubbing or edema  · VASCULAR: 	Absent bilaterally   	  TELEMETRY: Sinus/PVC/Couplets/NSVT       LABS:                        9.7    10.19 )-----------( 329      ( 08 Feb 2020 05:40 )             32.2     02-08    136  |  97<L>  |  32<H>  ----------------------------<  184<H>  5.1<H>   |  25  |  1.1    Ca    8.8      08 Feb 2020 05:40  Mg     1.7     02-08    TPro  5.7<L>  /  Alb  2.9<L>  /  TBili  2.4<H>  /  DBili  x   /  AST  28  /  ALT  17  /  AlkPhos  96  02-08            I&O's Summary]          07 Feb 2020 07:01  -  08 Feb 2020 07:00  --------------------------------------------------------  IN: 760 mL / OUT: 750 mL / NET: 10 mL    08 Feb 2020 07:01  -  08 Feb 2020 16:37  --------------------------------------------------------  IN: 1250 mL / OUT: 500 mL / NET: 750 mL      BNP  RADIOLOGY & ADDITIONAL STUDIES:    IMPRESSION AND PLAN:      Continue current care  DAPT  EP F/U  AICD/BiV  DVT/GI p,rophylaxis  Daily weight / Monitor I & Os  Will F/U

## 2020-02-08 NOTE — PROGRESS NOTE ADULT - SUBJECTIVE AND OBJECTIVE BOX
Patient is a 64y old  Male who presents with a chief complaint of intertrochanteric fracture of right hip (07 Feb 2020 06:48)  s/p ORIF by ortho. he is getting antibiotics for his R TKR SSI S/P wound vacc placement    OVERNIGHT EVENTS: none    SUBJECTIVE / INTERVAL HPI: Patient seen and examined at bedside. his LOC is waxing and waning      VITAL SIGNS:  Vital Signs Last 24 Hrs  T(C): 35.8 (08 Feb 2020 04:27), Max: 35.9 (07 Feb 2020 14:18)  T(F): 96.4 (08 Feb 2020 04:27), Max: 96.7 (07 Feb 2020 20:30)  HR: 77 (08 Feb 2020 04:27) (71 - 77)  BP: 107/57 (08 Feb 2020 04:27) (94/55 - 107/57)  BP(mean): --  RR: 18 (08 Feb 2020 04:27) (18 - 19)  SpO2: 95% (08 Feb 2020 08:51) (95% - 99%)    PHYSICAL EXAM:    General: WDWN  HEENT: NC/AT; PERRL, clear conjunctiva  Neck: supple  Cardiovascular: +S1/S2; RRR  Respiratory: CTA b/l; no W/R/R  Gastrointestinal: soft, NT/ND; +BSx4  Extremities: WWP; 2+ peripheral pulses; no edema   Neurological: AAOx3; no focal deficits    MEDICATIONS:  MEDICATIONS  (STANDING):  acetaminophen   Tablet .. 650 milliGRAM(s) Oral every 6 hours  ALPRAZolam 0.25 milliGRAM(s) Oral daily  aspirin enteric coated 81 milliGRAM(s) Oral daily  atorvastatin 40 milliGRAM(s) Oral at bedtime  chlorhexidine 4% Liquid 1 Application(s) Topical <User Schedule>  clindamycin   Capsule 300 milliGRAM(s) Oral every 8 hours  dextrose 5%. 1000 milliLiter(s) (50 mL/Hr) IV Continuous <Continuous>  dextrose 50% Injectable 25 Gram(s) IV Push once  dextrose 50% Injectable 25 Gram(s) IV Push once  dextrose 50% Injectable 12.5 Gram(s) IV Push once  digoxin     Tablet 0.125 milliGRAM(s) Oral daily  DULoxetine 90 milliGRAM(s) Oral daily  enoxaparin Injectable 40 milliGRAM(s) SubCutaneous daily  magnesium sulfate  IVPB 2 Gram(s) IV Intermittent once  metolazone 2.5 milliGRAM(s) Oral daily  metoprolol succinate  milliGRAM(s) Oral daily  pantoprazole    Tablet 40 milliGRAM(s) Oral before breakfast  prasugrel 10 milliGRAM(s) Oral daily  sodium chloride 0.9%. 500 milliLiter(s) (500 mL/Hr) IV Continuous <Continuous>  tamsulosin 0.4 milliGRAM(s) Oral at bedtime  tamsulosin 0.4 milliGRAM(s) Oral at bedtime    MEDICATIONS  (PRN):  dextrose 40% Gel 15 Gram(s) Oral once PRN Blood Glucose LESS THAN 70 milliGRAM(s)/deciliter  glucagon  Injectable 1 milliGRAM(s) IntraMuscular once PRN Glucose LESS THAN 70 milligrams/deciliter      ALLERGIES:  Allergies    ACE inhibitors (Hives)  enalapril (Hives)  rifampin (Angioedema)  vancomycin (Angioedema)    Intolerances        LABS:                        9.7    10.19 )-----------( 329      ( 08 Feb 2020 05:40 )             32.2     02-08    136  |  97<L>  |  32<H>  ----------------------------<  184<H>  5.1<H>   |  25  |  1.1    Ca    8.8      08 Feb 2020 05:40  Mg     1.7     02-08    TPro  5.7<L>  /  Alb  2.9<L>  /  TBili  2.4<H>  /  DBili  x   /  AST  28  /  ALT  17  /  AlkPhos  96  02-08        CAPILLARY BLOOD GLUCOSE      POCT Blood Glucose.: 174 mg/dL (08 Feb 2020 07:53)

## 2020-02-09 LAB
ALBUMIN SERPL ELPH-MCNC: 2.7 G/DL — LOW (ref 3.5–5.2)
ALP SERPL-CCNC: 90 U/L — SIGNIFICANT CHANGE UP (ref 30–115)
ALT FLD-CCNC: 14 U/L — SIGNIFICANT CHANGE UP (ref 0–41)
ANION GAP SERPL CALC-SCNC: 13 MMOL/L — SIGNIFICANT CHANGE UP (ref 7–14)
AST SERPL-CCNC: 21 U/L — SIGNIFICANT CHANGE UP (ref 0–41)
BASOPHILS # BLD AUTO: 0.03 K/UL — SIGNIFICANT CHANGE UP (ref 0–0.2)
BASOPHILS NFR BLD AUTO: 0.3 % — SIGNIFICANT CHANGE UP (ref 0–1)
BILIRUB SERPL-MCNC: 2.1 MG/DL — HIGH (ref 0.2–1.2)
BUN SERPL-MCNC: 30 MG/DL — HIGH (ref 10–20)
CALCIUM SERPL-MCNC: 8.5 MG/DL — SIGNIFICANT CHANGE UP (ref 8.5–10.1)
CHLORIDE SERPL-SCNC: 94 MMOL/L — LOW (ref 98–110)
CK MB CFR SERPL CALC: 5.8 NG/ML — SIGNIFICANT CHANGE UP (ref 0.6–6.3)
CO2 SERPL-SCNC: 27 MMOL/L — SIGNIFICANT CHANGE UP (ref 17–32)
CREAT SERPL-MCNC: 0.9 MG/DL — SIGNIFICANT CHANGE UP (ref 0.7–1.5)
EOSINOPHIL # BLD AUTO: 0.18 K/UL — SIGNIFICANT CHANGE UP (ref 0–0.7)
EOSINOPHIL NFR BLD AUTO: 1.8 % — SIGNIFICANT CHANGE UP (ref 0–8)
GLUCOSE BLDC GLUCOMTR-MCNC: 105 MG/DL — HIGH (ref 70–99)
GLUCOSE BLDC GLUCOMTR-MCNC: 114 MG/DL — HIGH (ref 70–99)
GLUCOSE BLDC GLUCOMTR-MCNC: 117 MG/DL — HIGH (ref 70–99)
GLUCOSE BLDC GLUCOMTR-MCNC: 157 MG/DL — HIGH (ref 70–99)
GLUCOSE SERPL-MCNC: 165 MG/DL — HIGH (ref 70–99)
HCT VFR BLD CALC: 33.7 % — LOW (ref 42–52)
HGB BLD-MCNC: 10.3 G/DL — LOW (ref 14–18)
IMM GRANULOCYTES NFR BLD AUTO: 0.4 % — HIGH (ref 0.1–0.3)
LYMPHOCYTES # BLD AUTO: 1.01 K/UL — LOW (ref 1.2–3.4)
LYMPHOCYTES # BLD AUTO: 9.8 % — LOW (ref 20.5–51.1)
MAGNESIUM SERPL-MCNC: 1.9 MG/DL — SIGNIFICANT CHANGE UP (ref 1.8–2.4)
MCHC RBC-ENTMCNC: 21.3 PG — LOW (ref 27–31)
MCHC RBC-ENTMCNC: 30.6 G/DL — LOW (ref 32–37)
MCV RBC AUTO: 69.8 FL — LOW (ref 80–94)
MONOCYTES # BLD AUTO: 1.25 K/UL — HIGH (ref 0.1–0.6)
MONOCYTES NFR BLD AUTO: 12.2 % — HIGH (ref 1.7–9.3)
NEUTROPHILS # BLD AUTO: 7.77 K/UL — HIGH (ref 1.4–6.5)
NEUTROPHILS NFR BLD AUTO: 75.5 % — HIGH (ref 42.2–75.2)
NRBC # BLD: 0 /100 WBCS — SIGNIFICANT CHANGE UP (ref 0–0)
PLATELET # BLD AUTO: 409 K/UL — HIGH (ref 130–400)
POTASSIUM SERPL-MCNC: 4.3 MMOL/L — SIGNIFICANT CHANGE UP (ref 3.5–5)
POTASSIUM SERPL-SCNC: 4.3 MMOL/L — SIGNIFICANT CHANGE UP (ref 3.5–5)
PROT SERPL-MCNC: 5.5 G/DL — LOW (ref 6–8)
RBC # BLD: 4.83 M/UL — SIGNIFICANT CHANGE UP (ref 4.7–6.1)
RBC # FLD: 20.7 % — HIGH (ref 11.5–14.5)
SODIUM SERPL-SCNC: 134 MMOL/L — LOW (ref 135–146)
TROPONIN T SERPL-MCNC: 0.03 NG/ML — CRITICAL HIGH
WBC # BLD: 10.28 K/UL — SIGNIFICANT CHANGE UP (ref 4.8–10.8)
WBC # FLD AUTO: 10.28 K/UL — SIGNIFICANT CHANGE UP (ref 4.8–10.8)

## 2020-02-09 PROCEDURE — 99232 SBSQ HOSP IP/OBS MODERATE 35: CPT

## 2020-02-09 PROCEDURE — 99233 SBSQ HOSP IP/OBS HIGH 50: CPT

## 2020-02-09 PROCEDURE — 99222 1ST HOSP IP/OBS MODERATE 55: CPT | Mod: 25

## 2020-02-09 PROCEDURE — 99024 POSTOP FOLLOW-UP VISIT: CPT

## 2020-02-09 PROCEDURE — ZZZZZ: CPT

## 2020-02-09 RX ORDER — TRAMADOL HYDROCHLORIDE 50 MG/1
50 TABLET ORAL ONCE
Refills: 0 | Status: DISCONTINUED | OUTPATIENT
Start: 2020-02-09 | End: 2020-02-09

## 2020-02-09 RX ADMIN — TRAMADOL HYDROCHLORIDE 50 MILLIGRAM(S): 50 TABLET ORAL at 11:00

## 2020-02-09 RX ADMIN — PANTOPRAZOLE SODIUM 40 MILLIGRAM(S): 20 TABLET, DELAYED RELEASE ORAL at 05:31

## 2020-02-09 RX ADMIN — Medication 650 MILLIGRAM(S): at 12:00

## 2020-02-09 RX ADMIN — TRAMADOL HYDROCHLORIDE 50 MILLIGRAM(S): 50 TABLET ORAL at 10:27

## 2020-02-09 RX ADMIN — Medication 150 MILLIGRAM(S): at 05:32

## 2020-02-09 RX ADMIN — Medication 300 MILLIGRAM(S): at 05:31

## 2020-02-09 RX ADMIN — Medication 300 MILLIGRAM(S): at 21:57

## 2020-02-09 RX ADMIN — Medication 81 MILLIGRAM(S): at 11:44

## 2020-02-09 RX ADMIN — Medication 300 MILLIGRAM(S): at 14:48

## 2020-02-09 RX ADMIN — Medication 0.25 MILLIGRAM(S): at 12:00

## 2020-02-09 RX ADMIN — Medication 650 MILLIGRAM(S): at 11:44

## 2020-02-09 RX ADMIN — Medication 650 MILLIGRAM(S): at 23:08

## 2020-02-09 RX ADMIN — Medication 650 MILLIGRAM(S): at 17:46

## 2020-02-09 RX ADMIN — CHLORHEXIDINE GLUCONATE 1 APPLICATION(S): 213 SOLUTION TOPICAL at 05:32

## 2020-02-09 RX ADMIN — DULOXETINE HYDROCHLORIDE 90 MILLIGRAM(S): 30 CAPSULE, DELAYED RELEASE ORAL at 11:45

## 2020-02-09 RX ADMIN — Medication 650 MILLIGRAM(S): at 17:18

## 2020-02-09 RX ADMIN — ATORVASTATIN CALCIUM 40 MILLIGRAM(S): 80 TABLET, FILM COATED ORAL at 21:56

## 2020-02-09 RX ADMIN — PRASUGREL 10 MILLIGRAM(S): 5 TABLET, FILM COATED ORAL at 11:44

## 2020-02-09 RX ADMIN — TAMSULOSIN HYDROCHLORIDE 0.4 MILLIGRAM(S): 0.4 CAPSULE ORAL at 21:57

## 2020-02-09 RX ADMIN — Medication 650 MILLIGRAM(S): at 05:32

## 2020-02-09 RX ADMIN — Medication 0.12 MILLIGRAM(S): at 05:32

## 2020-02-09 RX ADMIN — ENOXAPARIN SODIUM 40 MILLIGRAM(S): 100 INJECTION SUBCUTANEOUS at 11:45

## 2020-02-09 NOTE — CONSULT NOTE ADULT - SUBJECTIVE AND OBJECTIVE BOX
Surgeon: Dr. Fernandes    Consult requesting by: Dr. Coleman    HISTORY OF PRESENT ILLNESS: The patient is a 64 year-old male with a PMH of CAD s/p CABG, HFrEF (11%), DM (on insulin pump), pulmonary hypertension, Celiac disease, diverticulitis, s/p right total knee replacement October 2019 complicated by infection s/p abx course w/ PICC, presenting following a mechanical fall. He reports that over the last several days he has been feeling more tired; last night he felt especially tired and sat down in the chair. After then getting up to go to the kitchen, he "fell asleep standing," landing on his right buttock. He reports he had been asleep for about 20 seconds. He denies any associated loss of bladder or bowel continence, dizziness or lightheadedness, nausea or vomiting prior to or after the episode. His wife then called EMS and he presented to the ED. On presentation, vitals were T 97.8 F, HR 97, /78; trops 0.03. Right hip xray revealed an intertrochanteric fracture of the right hip, with varus angulation of the distal fragment, subtrochanteric extension with reverse obliquity and avulsion of the lesser trochanter. On admission the patient was resting comfortably; denied any chest pain, shortness of breath, dizziness, confusion, subjective fever, chills, nausea, vomiting, diarrhea. He lives w/ his wife at home; ambulates w/ a cane at baseline. (31 Jan 2020 08:40)      PAST MEDICAL & SURGICAL HISTORY:  Diabetic neuropathy  STEMI (ST elevation myocardial infarction)  Diverticulitis  MRSA bacteremia  History of celiac disease  CHF (congestive heart failure): EF ~ 25%  HTN (hypertension)  Diabetes: on insulin pump  Blood clot due to device, implant, or graft: was on blood thinners  HLD (hyperlipidemia)  Osteoarthritis  Atherosclerosis of coronary artery: CAD (coronary artery disease)  Status post percutaneous transluminal coronary angioplasty: in 2012  Surgery, elective: Right shoulder  Surgery, elective: right knee wound debridement  S/P TKR (total knee replacement), right: with infection Mrsa   per pt he was cleared from MRSA infection  S/P CABG x 1: 2018  Stented coronary artery: 10/18 heart attack  INFERIOR WALL MI  Other postprocedural status: Fixation hardware in foot LEFT      MEDICATIONS  (STANDING):  acetaminophen   Tablet .. 650 milliGRAM(s) Oral every 6 hours  ALPRAZolam 0.25 milliGRAM(s) Oral daily  aspirin enteric coated 81 milliGRAM(s) Oral daily  atorvastatin 40 milliGRAM(s) Oral at bedtime  chlorhexidine 4% Liquid 1 Application(s) Topical <User Schedule>  clindamycin   Capsule 300 milliGRAM(s) Oral every 8 hours  dextrose 5%. 1000 milliLiter(s) (50 mL/Hr) IV Continuous <Continuous>  dextrose 50% Injectable 25 Gram(s) IV Push once  dextrose 50% Injectable 25 Gram(s) IV Push once  dextrose 50% Injectable 12.5 Gram(s) IV Push once  digoxin     Tablet 0.125 milliGRAM(s) Oral daily  DULoxetine 90 milliGRAM(s) Oral daily  enoxaparin Injectable 40 milliGRAM(s) SubCutaneous daily  metolazone 2.5 milliGRAM(s) Oral daily  metoprolol succinate  milliGRAM(s) Oral daily  pantoprazole    Tablet 40 milliGRAM(s) Oral before breakfast  prasugrel 10 milliGRAM(s) Oral daily  sodium chloride 0.9%. 500 milliLiter(s) (500 mL/Hr) IV Continuous <Continuous>  tamsulosin 0.4 milliGRAM(s) Oral at bedtime    MEDICATIONS  (PRN):  dextrose 40% Gel 15 Gram(s) Oral once PRN Blood Glucose LESS THAN 70 milliGRAM(s)/deciliter  glucagon  Injectable 1 milliGRAM(s) IntraMuscular once PRN Glucose LESS THAN 70 milligrams/deciliter    Antiplatelet therapy:  effient                                Allergies  ACE inhibitors (Hives)  enalapril (Hives)  rifampin (Angioedema)  vancomycin (Angioedema)  Intolerances      SOCIAL HISTORY:  Smoker: [ ] Yes  [ ] No        PACK YEARS:                         WHEN QUIT?  ETOH use: [ ] Yes  [ ] No              FREQUENCY / QUANTITY:  Ilicit Drug use:  [ ] Yes  [ ] No  Occupation:  Lives with:  Assisted device use:  5 meter walk test: 1____sec, 2____sec, 3___sec  FAMILY HISTORY:  Family history of allergies: daughter  Family history of heart disease (Mother)    PHYSICAL EXAM  Vital Signs Last 24 Hrs  T(C): 36.6 (09 Feb 2020 14:33), Max: 36.7 (08 Feb 2020 21:39)  T(F): 97.8 (09 Feb 2020 14:33), Max: 98.1 (08 Feb 2020 21:39)  HR: 62 (09 Feb 2020 14:33) (62 - 69)  BP: 106/50 (09 Feb 2020 14:33) (106/50 - 126/67)  RR: 20 (09 Feb 2020 14:33) (20 - 20)  SpO2: 97% (09 Feb 2020 08:00) (95% - 97%)      GENERAL: In no apparent distress, well nourished, and hydrated.  HEART: Regular rate and rhythm; No murmur; NO rubs, or gallops.  PULMONARY: Clear to auscultation and percussion.  Normal expansion/effort. No rales, wheezing, or rhonchi bilaterally.  ABDOMEN: Soft, Nontender, Nondistended; Bowel sounds present  EXTREMITIES:  Extremities warm, pink, well-perfused, 2+ Peripheral Pulses, No clubbing, cyanosis, or edema  Rt LE wound vac in place, draing cclear serous fluid  NEUROLOGICAL: alert & oriented x 3, no focal deficits, PERRLA, EOMI                                                            LABS:                        10.3   10.28 )-----------( 409      ( 09 Feb 2020 05:42 )             33.7     02-09    134<L>  |  94<L>  |  30<H>  ----------------------------<  165<H>  4.3   |  27  |  0.9    Ca    8.5      09 Feb 2020 05:42  Mg     1.9     02-09    TPro  5.5<L>  /  Alb  2.7<L>  /  TBili  2.1<H>  /  DBili  x   /  AST  21  /  ALT  14  /  AlkPhos  90  02-09    CARDIAC MARKERS ( 09 Feb 2020 01:17 )  x     / 0.03 ng/mL / x     / x     / 5.8 ng/mL      Assessment/ Plan: 63 YO M with a PMH of CAD s/p CABG (LIMA to LAD), s/p PCI of RCA with instent thrombosis plavix resistance on Effient, ischemic CM/HFrEF (25%), DM (on insulin pump), pulmonary hypertension, Celiac disease, diverticulitis, s/p right total knee replacement October 2019 complicated by infection s/p abx course w/ PICC, presenting following a mechanical fall found to have right hip IT fracture  -Cases and plan discussed with CT surgeon Dr. Fernandes. Attending note to follow.  -Pre-op for [employer] BiV ICD as per EP  -Ortho surgical wound infection on PO abx. Plan for device implant when ID clears patient. Surgeon: Dr. Fernandes    Consult requesting by: Dr. Coleman    HISTORY OF PRESENT ILLNESS: The patient is a 64 year-old male with a PMH of CAD s/p CABG, HFrEF (11%), DM (on insulin pump), pulmonary hypertension, Celiac disease, diverticulitis, s/p right total knee replacement October 2019 complicated by infection s/p abx course w/ PICC, presenting following a mechanical fall. He reports that over the last several days he has been feeling more tired; last night he felt especially tired and sat down in the chair. After then getting up to go to the kitchen, he "fell asleep standing," landing on his right buttock. He reports he had been asleep for about 20 seconds. He denies any associated loss of bladder or bowel continence, dizziness or lightheadedness, nausea or vomiting prior to or after the episode. His wife then called EMS and he presented to the ED. On presentation, vitals were T 97.8 F, HR 97, /78; trops 0.03. Right hip xray revealed an intertrochanteric fracture of the right hip, with varus angulation of the distal fragment, subtrochanteric extension with reverse obliquity and avulsion of the lesser trochanter. On admission the patient was resting comfortably; denied any chest pain, shortness of breath, dizziness, confusion, subjective fever, chills, nausea, vomiting, diarrhea. He lives w/ his wife at home; ambulates w/ a cane at baseline. (31 Jan 2020 08:40)      PAST MEDICAL & SURGICAL HISTORY:  Diabetic neuropathy  STEMI (ST elevation myocardial infarction)  Diverticulitis  MRSA bacteremia  History of celiac disease  CHF (congestive heart failure): EF ~ 25%  HTN (hypertension)  Diabetes: on insulin pump  Blood clot due to device, implant, or graft: was on blood thinners  HLD (hyperlipidemia)  Osteoarthritis  Atherosclerosis of coronary artery: CAD (coronary artery disease)  Status post percutaneous transluminal coronary angioplasty: in 2012  Surgery, elective: Right shoulder  Surgery, elective: right knee wound debridement  S/P TKR (total knee replacement), right: with infection Mrsa   per pt he was cleared from MRSA infection  S/P CABG x 1: 2018  Stented coronary artery: 10/18 heart attack  INFERIOR WALL MI  Other postprocedural status: Fixation hardware in foot LEFT      MEDICATIONS  (STANDING):  acetaminophen   Tablet .. 650 milliGRAM(s) Oral every 6 hours  ALPRAZolam 0.25 milliGRAM(s) Oral daily  aspirin enteric coated 81 milliGRAM(s) Oral daily  atorvastatin 40 milliGRAM(s) Oral at bedtime  chlorhexidine 4% Liquid 1 Application(s) Topical <User Schedule>  clindamycin   Capsule 300 milliGRAM(s) Oral every 8 hours  dextrose 5%. 1000 milliLiter(s) (50 mL/Hr) IV Continuous <Continuous>  dextrose 50% Injectable 25 Gram(s) IV Push once  dextrose 50% Injectable 25 Gram(s) IV Push once  dextrose 50% Injectable 12.5 Gram(s) IV Push once  digoxin     Tablet 0.125 milliGRAM(s) Oral daily  DULoxetine 90 milliGRAM(s) Oral daily  enoxaparin Injectable 40 milliGRAM(s) SubCutaneous daily  metolazone 2.5 milliGRAM(s) Oral daily  metoprolol succinate  milliGRAM(s) Oral daily  pantoprazole    Tablet 40 milliGRAM(s) Oral before breakfast  prasugrel 10 milliGRAM(s) Oral daily  sodium chloride 0.9%. 500 milliLiter(s) (500 mL/Hr) IV Continuous <Continuous>  tamsulosin 0.4 milliGRAM(s) Oral at bedtime    MEDICATIONS  (PRN):  dextrose 40% Gel 15 Gram(s) Oral once PRN Blood Glucose LESS THAN 70 milliGRAM(s)/deciliter  glucagon  Injectable 1 milliGRAM(s) IntraMuscular once PRN Glucose LESS THAN 70 milligrams/deciliter    Antiplatelet therapy:  effient                                Allergies  ACE inhibitors (Hives)  enalapril (Hives)  rifampin (Angioedema)  vancomycin (Angioedema)  Intolerances    PHYSICAL EXAM  Vital Signs Last 24 Hrs  T(C): 36.6 (09 Feb 2020 14:33), Max: 36.7 (08 Feb 2020 21:39)  T(F): 97.8 (09 Feb 2020 14:33), Max: 98.1 (08 Feb 2020 21:39)  HR: 62 (09 Feb 2020 14:33) (62 - 69)  BP: 106/50 (09 Feb 2020 14:33) (106/50 - 126/67)  RR: 20 (09 Feb 2020 14:33) (20 - 20)  SpO2: 97% (09 Feb 2020 08:00) (95% - 97%)      GENERAL: In no apparent distress, well nourished, and hydrated.  HEART: Regular rate and rhythm; No murmur; NO rubs, or gallops.  PULMONARY: Clear to auscultation and percussion.  Normal expansion/effort. No rales, wheezing, or rhonchi bilaterally.  ABDOMEN: Soft, Nontender, Nondistended; Bowel sounds present  EXTREMITIES:  Extremities warm, pink, well-perfused, 2+ Peripheral Pulses, No clubbing, cyanosis, or edema  Rt LE wound vac in place, draing cclear serous fluid  NEUROLOGICAL: alert & oriented x 3, no focal deficits, PERRLA, EOMI                                                            LABS:                        10.3   10.28 )-----------( 409      ( 09 Feb 2020 05:42 )             33.7     02-09    134<L>  |  94<L>  |  30<H>  ----------------------------<  165<H>  4.3   |  27  |  0.9    Ca    8.5      09 Feb 2020 05:42  Mg     1.9     02-09    TPro  5.5<L>  /  Alb  2.7<L>  /  TBili  2.1<H>  /  DBili  x   /  AST  21  /  ALT  14  /  AlkPhos  90  02-09    CARDIAC MARKERS ( 09 Feb 2020 01:17 )  x     / 0.03 ng/mL / x     / x     / 5.8 ng/mL      Assessment/ Plan: 65 YO M with a PMH of CAD s/p CABG (LIMA to LAD), s/p PCI of RCA with instent thrombosis plavix resistance on Effient, ischemic CM/HFrEF (25%), DM (on insulin pump), pulmonary hypertension, Celiac disease, diverticulitis, s/p right total knee replacement October 2019 complicated by infection s/p abx course w/ PICC, presenting following a mechanical fall found to have right hip IT fracture  -Cases and plan discussed with CT surgeon Dr. Fernandes. Attending note to follow.  -Pre-op for [employer] BiV ICD as per EP  -Ortho surgical wound infection on PO abx. Plan for device implant when ID clears patient.

## 2020-02-09 NOTE — CONSULT NOTE ADULT - PROVIDER SPECIALTY LIST ADULT
Orthopedics
Allergy/Immunology
CT Surgery
Burn
Cardiology
Electrophysiology
Physiatry
Infectious Disease

## 2020-02-09 NOTE — PROGRESS NOTE ADULT - SUBJECTIVE AND OBJECTIVE BOX
LOIDA, JOHN  64y, Male  Allergy: ACE inhibitors (Hives)  enalapril (Hives)  rifampin (Angioedema)  vancomycin (Angioedema)  Hospital Day: 9d      Patient was seen and examined at bedside. no new complaints today. irritated that he cannot have more pain medication           VITALS:  T(F): 97.8 (02-09-20 @ 14:33), Max: 98.1 (02-08-20 @ 21:39)  HR: 62 (02-09-20 @ 14:33)  BP: 106/50 (02-09-20 @ 14:33) (106/50 - 126/67)  RR: 20 (02-09-20 @ 14:33)  SpO2: 97% (02-09-20 @ 08:00)    PHYSICAL EXAM:  GENERAL: well developed well nourished in no acute distress  NECK: No evidence of swelling no palpable lymphadenopathy  CHEST/LUNG: clear to ausculation bilaterally no wheezes, rales, or rhonchi   HEART/VASC: RRR, No murmurs, rubs, or gallops appreciated, 2+ equal bilateral radial pulse  ABDOMEN: Soft, non tender non distended +bowel sounds in all quadrants, no palpable organomegaly   EXTREMITIES:  limited ROM 2/2 pain bilateral LE    TESTS & MEASUREMENTS:  Weight (Kg):   BMI (kg/m2): 31.8 (02-05)    02-07-20 @ 07:01  -  02-08-20 @ 07:00  --------------------------------------------------------  IN: 760 mL / OUT: 750 mL / NET: 10 mL    02-08-20 @ 07:01  -  02-09-20 @ 07:00  --------------------------------------------------------  IN: 1490 mL / OUT: 1000 mL / NET: 490 mL    02-09-20 @ 07:01  -  02-09-20 @ 15:06  --------------------------------------------------------  IN: 540 mL / OUT: 200 mL / NET: 340 mL                            10.3   10.28 )-----------( 409      ( 09 Feb 2020 05:42 )             33.7       02-09    134<L>  |  94<L>  |  30<H>  ----------------------------<  165<H>  4.3   |  27  |  0.9    Ca    8.5      09 Feb 2020 05:42  Mg     1.9     02-09    TPro  5.5<L>  /  Alb  2.7<L>  /  TBili  2.1<H>  /  DBili  x   /  AST  21  /  ALT  14  /  AlkPhos  90  02-09    LIVER FUNCTIONS - ( 09 Feb 2020 05:42 )  Alb: 2.7 g/dL / Pro: 5.5 g/dL / ALK PHOS: 90 U/L / ALT: 14 U/L / AST: 21 U/L / GGT: x           CARDIAC MARKERS ( 09 Feb 2020 01:17 )  x     / 0.03 ng/mL / x     / x     / 5.8 ng/mL        Culture - Surgical Swab (collected 02-04-20 @ 15:20)  Source: .Surgical Swab SOURCE: RIGHT LEG EXTREMITY  Preliminary Report (02-07-20 @ 15:18):    Numerous Serratia marcescens    Moderate Escherichia coli    Normal skin tatum isolated  Organism: Serratia marcescens  Escherichia coli (02-07-20 @ 15:15)  Organism: Escherichia coli (02-07-20 @ 15:15)      -  Amikacin: S <=16      -  Amoxicillin/Clavulanic Acid: I 16/8      -  Ampicillin: R >16 These ampicillin results predict results for amoxicillin      -  Ampicillin/Sulbactam: R >16/8 Enterobacter, Citrobacter, and Serratia may develop resistance during prolonged therapy (3-4 days)      -  Aztreonam: S <=4      -  Cefazolin: R >16 Enterobacter, Citrobacter, and Serratia may develop resistance during prolonged therapy (3-4 days)      -  Cefepime: S <=2      -  Cefoxitin: S <=8      -  Ceftriaxone: S <=1 Enterobacter, Citrobacter, and Serratia may develop resistance during prolonged therapy      -  Ciprofloxacin: R >2      -  Ertapenem: S <=0.5      -  Gentamicin: S <=2      -  Imipenem: S <=1      -  Levofloxacin: R >4      -  Meropenem: S <=1      -  Piperacillin/Tazobactam: S 16      -  Tobramycin: S <=2      -  Trimethoprim/Sulfamethoxazole: S <=2/38      Method Type: YOLIE  Organism: Serratia marcescens (02-07-20 @ 15:14)      -  Amikacin: S <=16      -  Amoxicillin/Clavulanic Acid: R >16/8      -  Ampicillin: R 16 These ampicillin results predict results for amoxicillin      -  Ampicillin/Sulbactam: R 16/8 Enterobacter, Citrobacter, and Serratia may develop resistance during prolonged therapy (3-4 days)      -  Aztreonam: S <=4      -  Cefazolin: R >16 Enterobacter, Citrobacter, and Serratia may develop resistance during prolonged therapy (3-4 days)      -  Cefepime: S <=2      -  Cefoxitin: R 16      -  Ceftriaxone: S <=1 Enterobacter, Citrobacter, and Serratia may develop resistance during prolonged therapy      -  Ciprofloxacin: S <=1      -  Ertapenem: S <=0.5      -  Gentamicin: S <=2      -  Levofloxacin: S <=2      -  Meropenem: S <=1      -  Piperacillin/Tazobactam: S <=8      -  Tobramycin: S 4      -  Trimethoprim/Sulfamethoxazole: S <=2/38      Method Type: YOLIE        MEDICATIONS:  MEDICATIONS  (STANDING):  acetaminophen   Tablet .. 650 milliGRAM(s) Oral every 6 hours  ALPRAZolam 0.25 milliGRAM(s) Oral daily  aspirin enteric coated 81 milliGRAM(s) Oral daily  atorvastatin 40 milliGRAM(s) Oral at bedtime  chlorhexidine 4% Liquid 1 Application(s) Topical <User Schedule>  clindamycin   Capsule 300 milliGRAM(s) Oral every 8 hours  dextrose 5%. 1000 milliLiter(s) (50 mL/Hr) IV Continuous <Continuous>  dextrose 50% Injectable 25 Gram(s) IV Push once  dextrose 50% Injectable 25 Gram(s) IV Push once  dextrose 50% Injectable 12.5 Gram(s) IV Push once  digoxin     Tablet 0.125 milliGRAM(s) Oral daily  DULoxetine 90 milliGRAM(s) Oral daily  enoxaparin Injectable 40 milliGRAM(s) SubCutaneous daily  metolazone 2.5 milliGRAM(s) Oral daily  metoprolol succinate  milliGRAM(s) Oral daily  pantoprazole    Tablet 40 milliGRAM(s) Oral before breakfast  prasugrel 10 milliGRAM(s) Oral daily  sodium chloride 0.9%. 500 milliLiter(s) (500 mL/Hr) IV Continuous <Continuous>  tamsulosin 0.4 milliGRAM(s) Oral at bedtime    MEDICATIONS  (PRN):  dextrose 40% Gel 15 Gram(s) Oral once PRN Blood Glucose LESS THAN 70 milliGRAM(s)/deciliter  glucagon  Injectable 1 milliGRAM(s) IntraMuscular once PRN Glucose LESS THAN 70 milligrams/deciliter

## 2020-02-09 NOTE — PROGRESS NOTE ADULT - SUBJECTIVE AND OBJECTIVE BOX
Denies any chest pain, SOB or palpitations. No orthopnea or PND. He just feels weak and tired.       MEDICATIONS  (STANDING):  acetaminophen   Tablet .. 650 milliGRAM(s) Oral every 6 hours  ALPRAZolam 0.25 milliGRAM(s) Oral daily  aspirin enteric coated 81 milliGRAM(s) Oral daily  atorvastatin 40 milliGRAM(s) Oral at bedtime  chlorhexidine 4% Liquid 1 Application(s) Topical <User Schedule>  clindamycin   Capsule 300 milliGRAM(s) Oral every 8 hours  dextrose 5%. 1000 milliLiter(s) (50 mL/Hr) IV Continuous <Continuous>  dextrose 50% Injectable 25 Gram(s) IV Push once  dextrose 50% Injectable 25 Gram(s) IV Push once  dextrose 50% Injectable 12.5 Gram(s) IV Push once  digoxin     Tablet 0.125 milliGRAM(s) Oral daily  DULoxetine 90 milliGRAM(s) Oral daily  enoxaparin Injectable 40 milliGRAM(s) SubCutaneous daily  metolazone 2.5 milliGRAM(s) Oral daily  metoprolol succinate  milliGRAM(s) Oral daily  pantoprazole    Tablet 40 milliGRAM(s) Oral before breakfast  prasugrel 10 milliGRAM(s) Oral daily  sodium chloride 0.9%. 500 milliLiter(s) (500 mL/Hr) IV Continuous <Continuous>  tamsulosin 0.4 milliGRAM(s) Oral at bedtime    MEDICATIONS  (PRN):  dextrose 40% Gel 15 Gram(s) Oral once PRN Blood Glucose LESS THAN 70 milliGRAM(s)/deciliter  glucagon  Injectable 1 milliGRAM(s) IntraMuscular once PRN Glucose LESS THAN 70 milligrams/deciliter            Vital Signs Last 24 Hrs  T(C): 36.6 (09 Feb 2020 14:33), Max: 36.7 (08 Feb 2020 21:39)  T(F): 97.8 (09 Feb 2020 14:33), Max: 98.1 (08 Feb 2020 21:39)  HR: 62 (09 Feb 2020 14:33) (62 - 69)  BP: 106/50 (09 Feb 2020 14:33) (106/50 - 126/67)  BP(mean): --  RR: 20 (09 Feb 2020 14:33) (20 - 20)  SpO2: 97% (09 Feb 2020 08:00) (95% - 97%)     REVIEW OF SYSTEMS:  CONSTITUTIONAL: No fever, weight loss, or fatigue  CARDIOLOGY: Patient denies chest pain, shortness of breath or syncopal episodes.   RESPIRATORY: denies shortness of breath, wheezeing.   NEUROLOGICAL: NO weakness, no focal deficits to report.  GI: no BRBPR, no N,V,diarrhea.    PSYCHIATRY: normal mood and affect  HEENT: no nasal discharge, no ecchymosis  SKIN: no ecchymosis, no breakdown  MUSCULOSKELETAL: Full range of motion x4.        PHYSICAL EXAM:  · CONSTITUTIONAL:	Well-developed, well nourished      ·RESPIRATORY:   airway patent; breath sounds equal but decreased bilaterally   · CARDIOVASCULAR	regular rate and rhythm  + YULISA   · EXTREMITIES: No cyanosis, clubbing or edema  · VASCULAR: 	Absent PT bilaterally   	  TELEMETRY: NSVT     TTE: Severe LV dysfunction     LABS:                        10.3   10.28 )-----------( 409      ( 09 Feb 2020 05:42 )             33.7     02-09    134<L>  |  94<L>  |  30<H>  ----------------------------<  165<H>  4.3   |  27  |  0.9    Ca    8.5      09 Feb 2020 05:42  Mg     1.9     02-09    TPro  5.5<L>  /  Alb  2.7<L>  /  TBili  2.1<H>  /  DBili  x   /  AST  21  /  ALT  14  /  AlkPhos  90  02-09    CARDIAC MARKERS ( 09 Feb 2020 01:17 )  x     / 0.03 ng/mL / x     / x     / 5.8 ng/mL          I&O's Summary    08 Feb 2020 07:01  -  09 Feb 2020 07:00  --------------------------------------------------------  IN: 1490 mL / OUT: 1000 mL / NET: 490 mL    09 Feb 2020 07:01  -  09 Feb 2020 16:36  --------------------------------------------------------  IN: 540 mL / OUT: 200 mL / NET: 340 mL      BNP  RADIOLOGY & ADDITIONAL STUDIES:    IMPRESSION AND PLAN:      Continue current care  DAPT  DVT/GI prophylaxis  Nutritional consult  EP F/U for BiV AICD   Physical therapy   ID and Wound F/U

## 2020-02-09 NOTE — PROGRESS NOTE ADULT - ATTENDING COMMENTS
Plan of care discussed with the patient and his wife at bedside
: right knee dressing change--> good take skin graft    will d/c staple in am, pt may ambulate if ok with ortho
Pt seen and examined.  Agree with resident exam and plan.
Pt seen and examined.  Agree with resident exam and plan.
right knee post skin graft--> local wound care, d/c staples in a m
63yo M with Past Medical History CAD status post CABG, Chronic Systolic CHF (11%), DM (on insulin pump), pulmonary hypertension, Celiac disease, diverticulitis, status post right total knee replacement October 2019 complicated by infection status post an extended course of IV antibiotics admitted status post mechanical fall complicated by right hip fracture.  Status post ORIF.      #Fall complicated by Rt hip Fx S/P ORIF  -PT follow up requested   -avoid narcotics for pain control given acute delirium     #Ch. Rt. Knee wound S/P selective debridement and skin graft  -followed by  Burn  -cont clinda for a total of 10days     #CAD s/p CABG  -cont aspirin 81 mg PO qD,  metoprolol succinate  mg PO qD,  prasugrel 10 mg PO qD , atorvastatin 40 mg QHS    #HFrEF not in exacerbation   EF 11%  -spoke with EP today and  recommending  biVICD placement on this admission next week  -cont digoxin 125 mcg PO QD,  metolazone 2.5 mg PO, metoprolol succinate  mg PO QD    #Encephalopathy due to Delirium  A&Ox3 today  -cont to hold narcotics  -verified that pt takes low dose of xanax at home given persistent anxiety resume xanax 0.25mg QD and hold if sedated or mental status changes    #DM w/ peripheral neuropathy  insulin pump; on Trulicity/Jardiance at home  -FS qAC, qHS; if > 180, adjust insulin pump as needed  -duloxetine 90 mg PO QD    Progress Note Handoff  Pending:  BiVICD placement next week, PT follow up   Patient/Family discussion: POC discussed with pt he understands and is in agreement. requesting PT to re-evaluate him   Disposition: likely STR/SNF vs. 4a however, acute at this time
Complex patient   Ischemic cardiomyopathy  Chronic Systolic heart failure NYHA 3  EF 35-40% in 2018, 25% in October 2019, 11% in December 2019 despite GDMT  LBBB    Recommend:  -Obtain 2D Echo to assess EF on GDMT  -If EF < 35%, patient will benefit from BiV-ICD placement for resynchronization therapy and primary prevention of SCD  -Discussed care with patient and wife

## 2020-02-09 NOTE — CONSULT NOTE ADULT - REASON FOR ADMISSION
intertrochanteric fracture of right hip

## 2020-02-09 NOTE — PROGRESS NOTE ADULT - ASSESSMENT
63yo M with Past Medical History CAD status post CABG, Chronic Systolic CHF (11%), DM (on insulin pump), pulmonary hypertension, Celiac disease, diverticulitis, status post right total knee replacement October 2019 complicated by infection status post an extended course of IV antibiotics admitted status post mechanical fall complicated by right hip fracture.  Status post ORIF.      #Fall complicated by Rt hip Fx S/P ORIF  -PT follow up requested   -avoid narcotics for pain control given acute delirium   -ortho follow up    #Ch. Rt. Knee wound S/P selective debridement and skin graft  -followed by  Burn aminata to be removed today?  -cont clinda for a total of 10days       #CAD s/p CABG  -cont aspirin 81 mg PO qD,  metoprolol succinate  mg PO qD,  prasugrel 10 mg PO qD , atorvastatin 40 mg QHS    #HFrEF not in exacerbation   EF 11%  -spoke with EP and  recommending  biVICD placement on this admission next week  -cont digoxin 125 mcg PO QD,  metolazone 2.5 mg PO, metoprolol succinate  mg PO QD    #Encephalopathy due to Delirium  A&Ox3 today  -cont to hold narcotics  -resume xanax 0.25mg QD and hold if sedated or mental status changes    #DM w/ peripheral neuropathy  insulin pump; on Trulicity/Jardiance at home  -FS qAC, qHS; if > 180, adjust insulin pump as needed  -duloxetine 90 mg PO QD    Progress Note Handoff  Pending:  BiVICD placement next week, PT follow up   Patient/Family discussion: POC discussed with pt he understands and is in agreement.    Disposition: likely STR/SNF vs. 4a however, acute at this time .

## 2020-02-09 NOTE — PROGRESS NOTE ADULT - SUBJECTIVE AND OBJECTIVE BOX
INTERVAL HPI/OVERNIGHT EVENTS:  completed IV Abx treatment  Completing 10days of Clinda ptx (2/5-2/15)  Cleared by ID  Skin graft healing well  Cleared by Burn  plan for rehab placment      MEDICATIONS  (STANDING):  acetaminophen   Tablet .. 650 milliGRAM(s) Oral every 6 hours  ALPRAZolam 0.25 milliGRAM(s) Oral daily  aspirin enteric coated 81 milliGRAM(s) Oral daily  atorvastatin 40 milliGRAM(s) Oral at bedtime  chlorhexidine 4% Liquid 1 Application(s) Topical <User Schedule>  clindamycin   Capsule 300 milliGRAM(s) Oral every 8 hours  dextrose 5%. 1000 milliLiter(s) (50 mL/Hr) IV Continuous <Continuous>  dextrose 50% Injectable 25 Gram(s) IV Push once  dextrose 50% Injectable 25 Gram(s) IV Push once  dextrose 50% Injectable 12.5 Gram(s) IV Push once  digoxin     Tablet 0.125 milliGRAM(s) Oral daily  DULoxetine 90 milliGRAM(s) Oral daily  enoxaparin Injectable 40 milliGRAM(s) SubCutaneous daily  metolazone 2.5 milliGRAM(s) Oral daily  metoprolol succinate  milliGRAM(s) Oral daily  pantoprazole    Tablet 40 milliGRAM(s) Oral before breakfast  prasugrel 10 milliGRAM(s) Oral daily  sodium chloride 0.9%. 500 milliLiter(s) (500 mL/Hr) IV Continuous <Continuous>  tamsulosin 0.4 milliGRAM(s) Oral at bedtime    MEDICATIONS  (PRN):  dextrose 40% Gel 15 Gram(s) Oral once PRN Blood Glucose LESS THAN 70 milliGRAM(s)/deciliter  glucagon  Injectable 1 milliGRAM(s) IntraMuscular once PRN Glucose LESS THAN 70 milligrams/deciliter      Allergies    ACE inhibitors (Hives)  enalapril (Hives)  rifampin (Angioedema)  vancomycin (Angioedema)    Intolerances        REVIEW OF SYSTEMS    [x ] A ten-point review of systems was otherwise negative except as noted.      Vital Signs Last 24 Hrs  T(C): 36.6 (09 Feb 2020 14:33), Max: 36.7 (08 Feb 2020 21:39)  T(F): 97.8 (09 Feb 2020 14:33), Max: 98.1 (08 Feb 2020 21:39)  HR: 62 (09 Feb 2020 14:33) (62 - 69)  BP: 106/50 (09 Feb 2020 14:33) (106/50 - 126/67)  BP(mean): --  RR: 20 (09 Feb 2020 14:33) (20 - 20)  SpO2: 97% (09 Feb 2020 08:00) (95% - 97%)    02-08-20 @ 07:01  -  02-09-20 @ 07:00  --------------------------------------------------------  IN: 1490 mL / OUT: 1000 mL / NET: 490 mL    02-09-20 @ 07:01  -  02-09-20 @ 16:54  --------------------------------------------------------  IN: 540 mL / OUT: 200 mL / NET: 340 mL        PHYSICAL EXAM:    GENERAL: In no apparent distress, well nourished, and hydrated.  HEART: Regular rate and rhythm; No murmur; NO rubs, or gallops.  PULMONARY: Clear to auscultation and percussion.  Normal expansion/effort. No rales, wheezing, or rhonchi bilaterally.  ABDOMEN: Soft, Nontender, Nondistended; Bowel sounds present  EXTREMITIES:  Extremities warm, pink, well-perfused, 2+ Peripheral Pulses, No clubbing, cyanosis, or edema  Rt LE wound vac in place, draing cclear serous fluid  NEUROLOGICAL: alert & oriented x 3, no focal deficits, PERRLA, EOMI    LABS:                        10.3   10.28 )-----------( 409      ( 09 Feb 2020 05:42 )             33.7     02-09    134<L>  |  94<L>  |  30<H>  ----------------------------<  165<H>  4.3   |  27  |  0.9    Ca    8.5      09 Feb 2020 05:42  Mg     1.9     02-09    TPro  5.5<L>  /  Alb  2.7<L>  /  TBili  2.1<H>  /  DBili  x   /  AST  21  /  ALT  14  /  AlkPhos  90  02-09          EKG:    < from: 12 Lead ECG (02.05.20 @ 16:30) >  Ventricular Rate 75 BPM    Atrial Rate 75 BPM    P-R Interval 194 ms    QRS Duration 154 ms    Q-T Interval 396 ms    QTC Calculation(Bezet) 442 ms    P Axis 49 degrees    R Axis -48 degrees    T Axis 128 degrees    Diagnosis Line Sinus rhythm withoccasional Premature ventricular complexes  Left axis deviation  Left bundle branch block  Abnormal ECG    < end of copied text >    RADIOLOGY & ADDITIONAL TESTS: INTERVAL HPI/OVERNIGHT EVENTS:  completed IV Abx treatment  Completing 10days of Clinda ptx (2/5-2/15)  Cleared by ID  Skin graft healing well  Cleared by Burn  plan for rehab placment      MEDICATIONS  (STANDING):  acetaminophen   Tablet .. 650 milliGRAM(s) Oral every 6 hours  ALPRAZolam 0.25 milliGRAM(s) Oral daily  aspirin enteric coated 81 milliGRAM(s) Oral daily  atorvastatin 40 milliGRAM(s) Oral at bedtime  chlorhexidine 4% Liquid 1 Application(s) Topical <User Schedule>  clindamycin   Capsule 300 milliGRAM(s) Oral every 8 hours  dextrose 5%. 1000 milliLiter(s) (50 mL/Hr) IV Continuous <Continuous>  dextrose 50% Injectable 25 Gram(s) IV Push once  dextrose 50% Injectable 25 Gram(s) IV Push once  dextrose 50% Injectable 12.5 Gram(s) IV Push once  digoxin     Tablet 0.125 milliGRAM(s) Oral daily  DULoxetine 90 milliGRAM(s) Oral daily  enoxaparin Injectable 40 milliGRAM(s) SubCutaneous daily  metolazone 2.5 milliGRAM(s) Oral daily  metoprolol succinate  milliGRAM(s) Oral daily  pantoprazole    Tablet 40 milliGRAM(s) Oral before breakfast  prasugrel 10 milliGRAM(s) Oral daily  sodium chloride 0.9%. 500 milliLiter(s) (500 mL/Hr) IV Continuous <Continuous>  tamsulosin 0.4 milliGRAM(s) Oral at bedtime    MEDICATIONS  (PRN):  dextrose 40% Gel 15 Gram(s) Oral once PRN Blood Glucose LESS THAN 70 milliGRAM(s)/deciliter  glucagon  Injectable 1 milliGRAM(s) IntraMuscular once PRN Glucose LESS THAN 70 milligrams/deciliter      Allergies    ACE inhibitors (Hives)  enalapril (Hives)  rifampin (Angioedema)  vancomycin (Angioedema)    Intolerances        REVIEW OF SYSTEMS    [x ] A ten-point review of systems was otherwise negative except as noted.      Vital Signs Last 24 Hrs  T(C): 36.6 (09 Feb 2020 14:33), Max: 36.7 (08 Feb 2020 21:39)  T(F): 97.8 (09 Feb 2020 14:33), Max: 98.1 (08 Feb 2020 21:39)  HR: 62 (09 Feb 2020 14:33) (62 - 69)  BP: 106/50 (09 Feb 2020 14:33) (106/50 - 126/67)  BP(mean): --  RR: 20 (09 Feb 2020 14:33) (20 - 20)  SpO2: 97% (09 Feb 2020 08:00) (95% - 97%)    02-08-20 @ 07:01  -  02-09-20 @ 07:00  --------------------------------------------------------  IN: 1490 mL / OUT: 1000 mL / NET: 490 mL    02-09-20 @ 07:01  -  02-09-20 @ 16:54  --------------------------------------------------------  IN: 540 mL / OUT: 200 mL / NET: 340 mL        PHYSICAL EXAM:    GENERAL: In no apparent distress, well nourished, and hydrated.  HEART: Regular rate and rhythm; No murmur; NO rubs, or gallops.  PULMONARY: Clear to auscultation and percussion.  Normal expansion/effort. No rales, wheezing, or rhonchi bilaterally.  ABDOMEN: Soft, Nontender, Nondistended; Bowel sounds present  EXTREMITIES:  Extremities warm, pink, well-perfused, 2+ Peripheral Pulses, No clubbing, cyanosis, or edema  Rt LE wound vac in place, draing cclear serous fluid  NEUROLOGICAL: alert & oriented x 3, no focal deficits, PERRLA, EOMI    LABS:                        10.3   10.28 )-----------( 409      ( 09 Feb 2020 05:42 )             33.7     02-09    134<L>  |  94<L>  |  30<H>  ----------------------------<  165<H>  4.3   |  27  |  0.9    Ca    8.5      09 Feb 2020 05:42  Mg     1.9     02-09    TPro  5.5<L>  /  Alb  2.7<L>  /  TBili  2.1<H>  /  DBili  x   /  AST  21  /  ALT  14  /  AlkPhos  90  02-09      Urinalysis (02.04.20 @ 14:18)    Blood, Urine: Negative    Glucose Qualitative, Urine: Negative    pH Urine: 6.0    Color: Yellow    Urine Appearance: Clear    Bilirubin: Negative    Ketone - Urine: Negative    Specific Gravity: 1.016    Protein, Urine: Trace    Urobilinogen: <2 mg/dL    Nitrite: Negative    Leukocyte Esterase Concentration: Negative    Culture - Surgical Swab (02.04.20 @ 15:20)    -  Amikacin: S <=16    -  Amikacin: S <=16    -  Amoxicillin/Clavulanic Acid: R >16/8    -  Amoxicillin/Clavulanic Acid: I 16/8    -  Ceftriaxone: S <=1 Enterobacter, Citrobacter, and Serratia may develop resistance during prolonged therapy    -  Ceftriaxone: S <=1 Enterobacter, Citrobacter, and Serratia may develop resistance during prolonged therapy    -  Gentamicin: S <=2    -  Gentamicin: S <=2    -  Imipenem: S <=1    -  Levofloxacin: S <=2    -  Levofloxacin: R >4    -  Meropenem: S <=1    -  Meropenem: S <=1    -  Piperacillin/Tazobactam: S <=8    -  Piperacillin/Tazobactam: S 16    -  Ertapenem: S <=0.5    -  Ertapenem: S <=0.5    -  Aztreonam: S <=4    -  Aztreonam: S <=4    -  Ciprofloxacin: S <=1    -  Ciprofloxacin: R >2    -  Ampicillin: R 16 These ampicillin results predict results for amoxicillin    -  Ampicillin: R >16 These ampicillin results predict results for amoxicillin    -  Ampicillin/Sulbactam: R >16/8 Enterobacter, Citrobacter, and Serratia may develop resistance during prolonged therapy (3-4 days)    -  Ampicillin/Sulbactam: R 16/8 Enterobacter, Citrobacter, and Serratia may develop resistance during prolonged therapy (3-4 days)    -  Cefazolin: R >16 Enterobacter, Citrobacter, and Serratia may develop resistance during prolonged therapy (3-4 days)    -  Cefazolin: R >16 Enterobacter, Citrobacter, and Serratia may develop resistance during prolonged therapy (3-4 days)    -  Cefepime: S <=2    -  Cefepime: S <=2    -  Cefoxitin: R 16    -  Cefoxitin: S <=8    -  Tobramycin: S 4    -  Tobramycin: S <=2    -  Trimethoprim/Sulfamethoxazole: S <=2/38    -  Trimethoprim/Sulfamethoxazole: S <=2/38    Specimen Source: .Surgical Swab SOURCE: RIGHT LEG EXTREMITY    Culture Results:   Numerous Serratia marcescens  Moderate Escherichia coli  Normal skin tatum isolated    Organism Identification: Serratia marcescens  Escherichia coli    Organism: Serratia marcescens    Organism: Escherichia coli    Method Type: YOLIE    Method Type: YOLIE    Culture - Blood (12.21.19 @ 23:15)    Specimen Source: .Blood Blood-Venous    Culture Results:   No growth at 5 days.        EKG:    < from: 12 Lead ECG (02.05.20 @ 16:30) >  Ventricular Rate 75 BPM    Atrial Rate 75 BPM    P-R Interval 194 ms    QRS Duration 154 ms    Q-T Interval 396 ms    QTC Calculation(Bezet) 442 ms    P Axis 49 degrees    R Axis -48 degrees    T Axis 128 degrees    Diagnosis Line Sinus rhythm withoccasional Premature ventricular complexes  Left axis deviation  Left bundle branch block  Abnormal ECG    < end of copied text >    RADIOLOGY & ADDITIONAL TESTS:

## 2020-02-09 NOTE — PROGRESS NOTE ADULT - ASSESSMENT
65 YO M with a PMH of CAD s/p CABG (LIMA to LAD), s/p PCI of RCA with instent thrombosis plavix resistance on Effient, ischemic CM/HFrEF (25%), DM (on insulin pump), pulmonary hypertension, Celiac disease, diverticulitis, s/p right total knee replacement October 2019 complicated by infection s/p abx course w/ PICC, presenting following a mechanical fall found to have right hip IT fracture  S/P ORIF 63 YO M with a PMH of CAD s/p CABG (LIMA to LAD), s/p PCI of RCA with instent thrombosis plavix resistance on Effient, ischemic CM/HFrEF (25%), DM (on insulin pump), pulmonary hypertension, Celiac disease, diverticulitis, s/p right total knee replacement October 2019 complicated by infection s/p abx course w/ PICC, presenting following a mechanical fall found to have right hip IT fracture  ICM EF25%  awaiting CRT-D   S/P ORIF  s/p skin graft placement  cleared by ID (confirmed verbally    con't tele  Maintain electrolytes K>4.0 Mg >2.0  will plan BiV-AICD this week  Will consult CTS Dr Fernandes  Maintain electrolytes K>4.0 Mg >2.0  keep zoll pads on pt at all times  please obtain clearance from ID for cardiac device implantation  start rehab planning    d/w Dr Mejia  Pt will benefit from device placement day prior to discharge

## 2020-02-10 LAB
ALBUMIN SERPL ELPH-MCNC: 3 G/DL — LOW (ref 3.5–5.2)
ALP SERPL-CCNC: 92 U/L — SIGNIFICANT CHANGE UP (ref 30–115)
ALT FLD-CCNC: 14 U/L — SIGNIFICANT CHANGE UP (ref 0–41)
ANION GAP SERPL CALC-SCNC: 16 MMOL/L — HIGH (ref 7–14)
AST SERPL-CCNC: 25 U/L — SIGNIFICANT CHANGE UP (ref 0–41)
BASOPHILS # BLD AUTO: 0.04 K/UL — SIGNIFICANT CHANGE UP (ref 0–0.2)
BASOPHILS NFR BLD AUTO: 0.4 % — SIGNIFICANT CHANGE UP (ref 0–1)
BILIRUB SERPL-MCNC: 2.3 MG/DL — HIGH (ref 0.2–1.2)
BUN SERPL-MCNC: 27 MG/DL — HIGH (ref 10–20)
CALCIUM SERPL-MCNC: 8.6 MG/DL — SIGNIFICANT CHANGE UP (ref 8.5–10.1)
CHLORIDE SERPL-SCNC: 97 MMOL/L — LOW (ref 98–110)
CO2 SERPL-SCNC: 23 MMOL/L — SIGNIFICANT CHANGE UP (ref 17–32)
CREAT SERPL-MCNC: 0.9 MG/DL — SIGNIFICANT CHANGE UP (ref 0.7–1.5)
EOSINOPHIL # BLD AUTO: 0.4 K/UL — SIGNIFICANT CHANGE UP (ref 0–0.7)
EOSINOPHIL NFR BLD AUTO: 3.6 % — SIGNIFICANT CHANGE UP (ref 0–8)
GLUCOSE BLDC GLUCOMTR-MCNC: 118 MG/DL — HIGH (ref 70–99)
GLUCOSE BLDC GLUCOMTR-MCNC: 164 MG/DL — HIGH (ref 70–99)
GLUCOSE BLDC GLUCOMTR-MCNC: 181 MG/DL — HIGH (ref 70–99)
GLUCOSE BLDC GLUCOMTR-MCNC: 196 MG/DL — HIGH (ref 70–99)
GLUCOSE BLDC GLUCOMTR-MCNC: 219 MG/DL — HIGH (ref 70–99)
GLUCOSE BLDC GLUCOMTR-MCNC: 219 MG/DL — HIGH (ref 70–99)
GLUCOSE BLDC GLUCOMTR-MCNC: 252 MG/DL — HIGH (ref 70–99)
GLUCOSE SERPL-MCNC: 91 MG/DL — SIGNIFICANT CHANGE UP (ref 70–99)
HCT VFR BLD CALC: 32 % — LOW (ref 42–52)
HGB BLD-MCNC: 10 G/DL — LOW (ref 14–18)
IMM GRANULOCYTES NFR BLD AUTO: 0.5 % — HIGH (ref 0.1–0.3)
LYMPHOCYTES # BLD AUTO: 1.05 K/UL — LOW (ref 1.2–3.4)
LYMPHOCYTES # BLD AUTO: 9.3 % — LOW (ref 20.5–51.1)
MAGNESIUM SERPL-MCNC: 1.9 MG/DL — SIGNIFICANT CHANGE UP (ref 1.8–2.4)
MCHC RBC-ENTMCNC: 22.3 PG — LOW (ref 27–31)
MCHC RBC-ENTMCNC: 31.3 G/DL — LOW (ref 32–37)
MCV RBC AUTO: 71.3 FL — LOW (ref 80–94)
MONOCYTES # BLD AUTO: 1.41 K/UL — HIGH (ref 0.1–0.6)
MONOCYTES NFR BLD AUTO: 12.5 % — HIGH (ref 1.7–9.3)
NEUTROPHILS # BLD AUTO: 8.29 K/UL — HIGH (ref 1.4–6.5)
NEUTROPHILS NFR BLD AUTO: 73.7 % — SIGNIFICANT CHANGE UP (ref 42.2–75.2)
NRBC # BLD: 0 /100 WBCS — SIGNIFICANT CHANGE UP (ref 0–0)
PLATELET # BLD AUTO: 401 K/UL — HIGH (ref 130–400)
POTASSIUM SERPL-MCNC: 4.1 MMOL/L — SIGNIFICANT CHANGE UP (ref 3.5–5)
POTASSIUM SERPL-SCNC: 4.1 MMOL/L — SIGNIFICANT CHANGE UP (ref 3.5–5)
PROT SERPL-MCNC: 5.6 G/DL — LOW (ref 6–8)
RBC # BLD: 4.49 M/UL — LOW (ref 4.7–6.1)
RBC # FLD: 21.4 % — HIGH (ref 11.5–14.5)
SODIUM SERPL-SCNC: 136 MMOL/L — SIGNIFICANT CHANGE UP (ref 135–146)
WBC # BLD: 11.25 K/UL — HIGH (ref 4.8–10.8)
WBC # FLD AUTO: 11.25 K/UL — HIGH (ref 4.8–10.8)

## 2020-02-10 PROCEDURE — 99233 SBSQ HOSP IP/OBS HIGH 50: CPT

## 2020-02-10 PROCEDURE — 99024 POSTOP FOLLOW-UP VISIT: CPT

## 2020-02-10 RX ORDER — INSULIN LISPRO 100/ML
VIAL (ML) SUBCUTANEOUS
Refills: 0 | Status: DISCONTINUED | OUTPATIENT
Start: 2020-02-10 | End: 2020-02-11

## 2020-02-10 RX ORDER — INSULIN LISPRO 100/ML
3 VIAL (ML) SUBCUTANEOUS
Refills: 0 | Status: DISCONTINUED | OUTPATIENT
Start: 2020-02-10 | End: 2020-02-11

## 2020-02-10 RX ADMIN — Medication 300 MILLIGRAM(S): at 21:35

## 2020-02-10 RX ADMIN — Medication 4: at 21:35

## 2020-02-10 RX ADMIN — Medication 300 MILLIGRAM(S): at 13:15

## 2020-02-10 RX ADMIN — Medication 650 MILLIGRAM(S): at 07:30

## 2020-02-10 RX ADMIN — ATORVASTATIN CALCIUM 40 MILLIGRAM(S): 80 TABLET, FILM COATED ORAL at 21:35

## 2020-02-10 RX ADMIN — Medication 150 MILLIGRAM(S): at 05:58

## 2020-02-10 RX ADMIN — Medication 650 MILLIGRAM(S): at 12:22

## 2020-02-10 RX ADMIN — PANTOPRAZOLE SODIUM 40 MILLIGRAM(S): 20 TABLET, DELAYED RELEASE ORAL at 06:07

## 2020-02-10 RX ADMIN — ENOXAPARIN SODIUM 40 MILLIGRAM(S): 100 INJECTION SUBCUTANEOUS at 12:24

## 2020-02-10 RX ADMIN — TAMSULOSIN HYDROCHLORIDE 0.4 MILLIGRAM(S): 0.4 CAPSULE ORAL at 21:35

## 2020-02-10 RX ADMIN — Medication 650 MILLIGRAM(S): at 23:11

## 2020-02-10 RX ADMIN — Medication 0.12 MILLIGRAM(S): at 05:58

## 2020-02-10 RX ADMIN — Medication 650 MILLIGRAM(S): at 13:12

## 2020-02-10 RX ADMIN — Medication 0.25 MILLIGRAM(S): at 12:25

## 2020-02-10 RX ADMIN — Medication 81 MILLIGRAM(S): at 12:22

## 2020-02-10 RX ADMIN — DULOXETINE HYDROCHLORIDE 90 MILLIGRAM(S): 30 CAPSULE, DELAYED RELEASE ORAL at 12:23

## 2020-02-10 RX ADMIN — PRASUGREL 10 MILLIGRAM(S): 5 TABLET, FILM COATED ORAL at 12:26

## 2020-02-10 RX ADMIN — Medication 300 MILLIGRAM(S): at 05:58

## 2020-02-10 RX ADMIN — Medication 650 MILLIGRAM(S): at 05:57

## 2020-02-10 RX ADMIN — CHLORHEXIDINE GLUCONATE 1 APPLICATION(S): 213 SOLUTION TOPICAL at 06:05

## 2020-02-10 NOTE — PROGRESS NOTE ADULT - ASSESSMENT
63yo M with Past Medical History CAD status post CABG, Chronic Systolic CHF (11%), DM (on insulin pump), pulmonary hypertension, Celiac disease, diverticulitis, status post right total knee replacement October 2019 complicated by infection status post an extended course of IV antibiotics admitted status post mechanical fall complicated by right hip fracture.  Status post ORIF.      1.	Fall complicated by Rt hip Fx S/P ORIF  2.	Ch. Rt. Knee wound S/P selective debridement and skin graft.   3.	CAD S/P CABG  4.	Ch. HFrEF  5.	CM - EF 11%  6.	Urinary retention  7.	Encephalopathy due to Delirium          PLAN:    ·	ID F/U noted. No abscess or cellulitis of Rt knee. No underlying hardware infection  ·	ID cleared the pt for the procedure. Recommended to give one dose of Zosyn 3.375 prior to procedure  ·	Cont clindamycin and complete 10 days course.   ·	Wound vac care as per Burn  ·	Will call CTS to schedule him for the procedure  ·	Not on ACE-I likely due to low BP.   ·	Remove Loja's catheter for voiding trial   ·	No narcotics as the pt becomes confused.    ·	Renal US is unremarkable.   ·	Cont his other home meds.     Progress note Hand off:    PENDING: AICD placement  Disposition: SNF

## 2020-02-10 NOTE — PROGRESS NOTE ADULT - SUBJECTIVE AND OBJECTIVE BOX
FLOWER MARISCAL  64y Male    CHIEF COMPLAINT:    Patient is a 64y old  Male who presents with a chief complaint of intertrochanteric fracture of right hip (10 Feb 2020 08:47)      INTERVAL HPI/OVERNIGHT EVENTS:    Patient seen and examined. No fever. No sob. No pain. Waiting for AICD placement after ID clearance.     ROS: All other systems are negative.    Vital Signs:    T(F): 97 (02-10-20 @ 05:19), Max: 97.8 (20 @ 14:33)  HR: 65 (02-10-20 @ 05:19) (62 - 67)  BP: 121/56 (02-10-20 @ 05:19) (106/50 - 121/56)  RR: 18 (20 @ 20:29) (18 - 20)  SpO2: 95% (02-10-20 @ 09:00) (94% - 95%)  I&O's Summary    2020 07:  -  10 Feb 2020 07:00  --------------------------------------------------------  IN: 760 mL / OUT: 700 mL / NET: 60 mL    10 Feb 2020 07:01  -  10 Feb 2020 13:05  --------------------------------------------------------  IN: 250 mL / OUT: 0 mL / NET: 250 mL      Daily     Daily Weight in k.5 (10 Feb 2020 07:31)  CAPILLARY BLOOD GLUCOSE      POCT Blood Glucose.: 164 mg/dL (10 Feb 2020 11:43)  POCT Blood Glucose.: 118 mg/dL (10 Feb 2020 07:39)  POCT Blood Glucose.: 117 mg/dL (2020 21:07)  POCT Blood Glucose.: 105 mg/dL (2020 16:37)      PHYSICAL EXAM:    GENERAL:  NAD  SKIN: No rashes or lesions  HENT: Atraumatic Normocephalic. PERRL. Moist membranes.  NECK: Supple, No JVD. No lymphadenopathy.  PULMONARY: CTA B/L. No wheezing. No rales  CVS: Normal S1, S2. Rate and Rhythm are regular. No murmurs.  ABDOMEN/GI: Soft, Nontender, Nondistended; BS present  EXTREMITIES: Peripheral pulses intact. No edema B/L LE. Dressing and vac pump on Rt leg  NEUROLOGIC:  No motor or sensory deficit.  PSYCH: Alert & oriented x 3    Consultant(s) Notes Reviewed:  [x ] YES  [ ] NO  Care Discussed with Consultants/Other Providers [ x] YES  [ ] NO    EKG reviewed  Telemetry reviewed    LABS:                        10.   11.25 )-----------( 401      ( 10 Feb 2020 05:58 )             32.0     02-10    136  |  97<L>  |  27<H>  ----------------------------<  91  4.1   |  23  |  0.9    Ca    8.6      10 Feb 2020 05:58  Mg     1.9     02-10    TPro  5.6<L>  /  Alb  3.0<L>  /  TBili  2.3<H>  /  DBili  x   /  AST  25  /  ALT  14  /  AlkPhos  92  02-10        Trop 0.03, CKMB 5.8, CK --, 20 @ 01:17        RADIOLOGY & ADDITIONAL TESTS:      Imaging or report Personally Reviewed:  [ ] YES  [ ] NO    Medications:  Standing  acetaminophen   Tablet .. 650 milliGRAM(s) Oral every 6 hours  ALPRAZolam 0.25 milliGRAM(s) Oral daily  aspirin enteric coated 81 milliGRAM(s) Oral daily  atorvastatin 40 milliGRAM(s) Oral at bedtime  chlorhexidine 4% Liquid 1 Application(s) Topical <User Schedule>  clindamycin   Capsule 300 milliGRAM(s) Oral every 8 hours  dextrose 5%. 1000 milliLiter(s) IV Continuous <Continuous>  dextrose 50% Injectable 25 Gram(s) IV Push once  dextrose 50% Injectable 25 Gram(s) IV Push once  dextrose 50% Injectable 12.5 Gram(s) IV Push once  digoxin     Tablet 0.125 milliGRAM(s) Oral daily  DULoxetine 90 milliGRAM(s) Oral daily  enoxaparin Injectable 40 milliGRAM(s) SubCutaneous daily  metolazone 2.5 milliGRAM(s) Oral daily  metoprolol succinate  milliGRAM(s) Oral daily  pantoprazole    Tablet 40 milliGRAM(s) Oral before breakfast  prasugrel 10 milliGRAM(s) Oral daily  sodium chloride 0.9%. 500 milliLiter(s) IV Continuous <Continuous>  tamsulosin 0.4 milliGRAM(s) Oral at bedtime    PRN Meds  dextrose 40% Gel 15 Gram(s) Oral once PRN  glucagon  Injectable 1 milliGRAM(s) IntraMuscular once PRN      Case discussed with resident    Care discussed with pt/family
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
## 2020-02-10 NOTE — PROGRESS NOTE ADULT - SUBJECTIVE AND OBJECTIVE BOX
LOIDA, JOHN  64y, Male  Allergy: ACE inhibitors (Hives)  enalapril (Hives)  rifampin (Angioedema)  vancomycin (Angioedema)      CHIEF COMPLAINT: intertrochanteric fracture of right hip (10 Feb 2020 06:57)      INTERVAL EVENTS/HPI  - No acute events overnight  - T(F): , Max: 97.8 (02-09-20 @ 14:33)  - Denies any worsening symptoms  - Tolerating medication  - WBC Count: 11.25 K/uL (02-10-20 @ 05:58)      ROS  General: Denies fevers, chills, nightsweats, weight loss  HEENT: Denies headache, rhinorrhea, sore throat, eye pain  CV: Denies CP, palpitations  PULM: Denies SOB, cough  GI: Denies abdominal pain, diarrhea  : Denies dysuria, hematuria  MSK: Denies arthralgias  SKIN: Denies rash   NEURO: Denies paresthesias, weakness  PSYCH: Denies depression    FH non-contributory   Social Hx non-contributory    VITALS:  T(F): 97, Max: 97.8 (02-09-20 @ 14:33)  HR: 65  BP: 121/56  RR: 18Vital Signs Last 24 Hrs  T(C): 36.1 (10 Feb 2020 05:19), Max: 36.6 (09 Feb 2020 14:33)  T(F): 97 (10 Feb 2020 05:19), Max: 97.8 (09 Feb 2020 14:33)  HR: 65 (10 Feb 2020 05:19) (62 - 67)  BP: 121/56 (10 Feb 2020 05:19) (106/50 - 121/56)  BP(mean): --  RR: 18 (09 Feb 2020 20:29) (18 - 20)  SpO2: 94% (09 Feb 2020 19:36) (94% - 94%)    PHYSICAL EXAM:  Gen: NAD, resting in bed  HEENT: Normocephalic, atraumatic  Neck: supple, no lymphadenopathy  CV: Regular rate & regular rhythm  Lungs: decreased BS at bases, no fremitus  Abdomen: Soft, BS present  Ext: Warm, well perfused  Neuro: non focal, awake  Skin: no rash, no erythema      TESTS & MEASUREMENTS:                        10.0   11.25 )-----------( 401      ( 10 Feb 2020 05:58 )             32.0     02-10    136  |  97<L>  |  27<H>  ----------------------------<  91  4.1   |  23  |  0.9    Ca    8.6      10 Feb 2020 05:58  Mg     1.9     02-10    TPro  5.6<L>  /  Alb  3.0<L>  /  TBili  2.3<H>  /  DBili  x   /  AST  25  /  ALT  14  /  AlkPhos  92  02-10    eGFR if Non African American: 91 mL/min/1.73M2 (02-10-20 @ 05:58)  eGFR if African American: 106 mL/min/1.73M2 (02-10-20 @ 05:58)    LIVER FUNCTIONS - ( 10 Feb 2020 05:58 )  Alb: 3.0 g/dL / Pro: 5.6 g/dL / ALK PHOS: 92 U/L / ALT: 14 U/L / AST: 25 U/L / GGT: x               Culture - Surgical Swab (collected 02-04-20 @ 15:20)  Source: .Surgical Swab SOURCE: RIGHT LEG EXTREMITY  Preliminary Report (02-07-20 @ 15:18):    Numerous Serratia marcescens    Moderate Escherichia coli    Normal skin tatum isolated  Organism: Serratia marcescens  Escherichia coli (02-07-20 @ 15:15)  Organism: Escherichia coli (02-07-20 @ 15:15)      -  Amikacin: S <=16      -  Amoxicillin/Clavulanic Acid: I 16/8      -  Ampicillin: R >16 These ampicillin results predict results for amoxicillin      -  Ampicillin/Sulbactam: R >16/8 Enterobacter, Citrobacter, and Serratia may develop resistance during prolonged therapy (3-4 days)      -  Aztreonam: S <=4      -  Cefazolin: R >16 Enterobacter, Citrobacter, and Serratia may develop resistance during prolonged therapy (3-4 days)      -  Cefepime: S <=2      -  Cefoxitin: S <=8      -  Ceftriaxone: S <=1 Enterobacter, Citrobacter, and Serratia may develop resistance during prolonged therapy      -  Ciprofloxacin: R >2      -  Ertapenem: S <=0.5      -  Gentamicin: S <=2      -  Imipenem: S <=1      -  Levofloxacin: R >4      -  Meropenem: S <=1      -  Piperacillin/Tazobactam: S 16      -  Tobramycin: S <=2      -  Trimethoprim/Sulfamethoxazole: S <=2/38      Method Type: YOLIE  Organism: Serratia marcescens (02-07-20 @ 15:14)      -  Amikacin: S <=16      -  Amoxicillin/Clavulanic Acid: R >16/8      -  Ampicillin: R 16 These ampicillin results predict results for amoxicillin      -  Ampicillin/Sulbactam: R 16/8 Enterobacter, Citrobacter, and Serratia may develop resistance during prolonged therapy (3-4 days)      -  Aztreonam: S <=4      -  Cefazolin: R >16 Enterobacter, Citrobacter, and Serratia may develop resistance during prolonged therapy (3-4 days)      -  Cefepime: S <=2      -  Cefoxitin: R 16      -  Ceftriaxone: S <=1 Enterobacter, Citrobacter, and Serratia may develop resistance during prolonged therapy      -  Ciprofloxacin: S <=1      -  Ertapenem: S <=0.5      -  Gentamicin: S <=2      -  Levofloxacin: S <=2      -  Meropenem: S <=1      -  Piperacillin/Tazobactam: S <=8      -  Tobramycin: S 4      -  Trimethoprim/Sulfamethoxazole: S <=2/38      Method Type: YOLIE            INFECTIOUS DISEASES TESTING  Hepatitis C Virus Interpretation: Nonreact (10-22-19 @ 07:00)  MRSA PCR Result.: Negative (10-14-19 @ 17:18)      RADIOLOGY & ADDITIONAL TESTS:  I have personally reviewed the last Chest xray  CXR      CT      CARDIOLOGY TESTING  12 Lead ECG:   Ventricular Rate 75 BPM    Atrial Rate 75 BPM    P-R Interval 194 ms    QRS Duration 154 ms    Q-T Interval 396 ms    QTC Calculation(Bezet) 442 ms    P Axis 49 degrees    R Axis -48 degrees    T Axis 128 degrees    Diagnosis Line Sinus rhythm withoccasional Premature ventricular complexes  Left axis deviation  Left bundle branch block  Abnormal ECG    Confirmed by London Santos ([id number]) on 2/6/2020 4:31:51 PM (02-05-20 @ 16:30)  12 Lead ECG:   Ventricular Rate 94 BPM    Atrial Rate 94 BPM    P-R Interval 192 ms    QRS Duration 148 ms    Q-T Interval 390 ms    QTC Calculation(Bezet) 487 ms    P Axis 55 degrees    R Axis -55 degrees    T Axis 112 degrees    Diagnosis Line Sinus rhythm withPremature atrial complexes with Aberrant conduction  Left axis deviation  Left bundle branch block  Abnormal ECG    Confirmed by Ze Beebe ([id number]) on 1/31/2020 5:50:22 AM (01-31-20 @ 02:39)      MEDICATIONS  acetaminophen   Tablet .. 650  ALPRAZolam 0.25  aspirin enteric coated 81  atorvastatin 40  chlorhexidine 4% Liquid 1  clindamycin   Capsule 300  dextrose 5%. 1000  dextrose 50% Injectable 25  dextrose 50% Injectable 25  dextrose 50% Injectable 12.5  digoxin     Tablet 0.125  DULoxetine 90  enoxaparin Injectable 40  metolazone 2.5  metoprolol succinate   pantoprazole    Tablet 40  prasugrel 10  sodium chloride 0.9%. 500  tamsulosin 0.4      ANTIBIOTICS:  clindamycin   Capsule 300 milliGRAM(s) Oral every 8 hours      All available historical data has been reviewed
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
## 2020-02-11 LAB
ALBUMIN SERPL ELPH-MCNC: 3.1 G/DL — LOW (ref 3.5–5.2)
ALP SERPL-CCNC: 99 U/L — SIGNIFICANT CHANGE UP (ref 30–115)
ALT FLD-CCNC: 14 U/L — SIGNIFICANT CHANGE UP (ref 0–41)
ANION GAP SERPL CALC-SCNC: 13 MMOL/L — SIGNIFICANT CHANGE UP (ref 7–14)
APTT BLD: 25.7 SEC — LOW (ref 27–39.2)
AST SERPL-CCNC: 20 U/L — SIGNIFICANT CHANGE UP (ref 0–41)
BASOPHILS # BLD AUTO: 0.03 K/UL — SIGNIFICANT CHANGE UP (ref 0–0.2)
BASOPHILS NFR BLD AUTO: 0.3 % — SIGNIFICANT CHANGE UP (ref 0–1)
BILIRUB SERPL-MCNC: 2.3 MG/DL — HIGH (ref 0.2–1.2)
BLD GP AB SCN SERPL QL: SIGNIFICANT CHANGE UP
BUN SERPL-MCNC: 21 MG/DL — HIGH (ref 10–20)
CALCIUM SERPL-MCNC: 8.8 MG/DL — SIGNIFICANT CHANGE UP (ref 8.5–10.1)
CHLORIDE SERPL-SCNC: 92 MMOL/L — LOW (ref 98–110)
CO2 SERPL-SCNC: 30 MMOL/L — SIGNIFICANT CHANGE UP (ref 17–32)
CREAT SERPL-MCNC: 0.9 MG/DL — SIGNIFICANT CHANGE UP (ref 0.7–1.5)
EOSINOPHIL # BLD AUTO: 0.32 K/UL — SIGNIFICANT CHANGE UP (ref 0–0.7)
EOSINOPHIL NFR BLD AUTO: 3.3 % — SIGNIFICANT CHANGE UP (ref 0–8)
GLUCOSE BLDC GLUCOMTR-MCNC: 163 MG/DL — HIGH (ref 70–99)
GLUCOSE BLDC GLUCOMTR-MCNC: 165 MG/DL — HIGH (ref 70–99)
GLUCOSE BLDC GLUCOMTR-MCNC: 193 MG/DL — HIGH (ref 70–99)
GLUCOSE BLDC GLUCOMTR-MCNC: 229 MG/DL — HIGH (ref 70–99)
GLUCOSE SERPL-MCNC: 163 MG/DL — HIGH (ref 70–99)
HCT VFR BLD CALC: 31.9 % — LOW (ref 42–52)
HGB BLD-MCNC: 9.8 G/DL — LOW (ref 14–18)
IMM GRANULOCYTES NFR BLD AUTO: 0.5 % — HIGH (ref 0.1–0.3)
INR BLD: 1.46 RATIO — HIGH (ref 0.65–1.3)
LYMPHOCYTES # BLD AUTO: 0.78 K/UL — LOW (ref 1.2–3.4)
LYMPHOCYTES # BLD AUTO: 7.9 % — LOW (ref 20.5–51.1)
MAGNESIUM SERPL-MCNC: 1.7 MG/DL — LOW (ref 1.8–2.4)
MCHC RBC-ENTMCNC: 22 PG — LOW (ref 27–31)
MCHC RBC-ENTMCNC: 30.7 G/DL — LOW (ref 32–37)
MCV RBC AUTO: 71.7 FL — LOW (ref 80–94)
MONOCYTES # BLD AUTO: 1.45 K/UL — HIGH (ref 0.1–0.6)
MONOCYTES NFR BLD AUTO: 14.7 % — HIGH (ref 1.7–9.3)
NEUTROPHILS # BLD AUTO: 7.21 K/UL — HIGH (ref 1.4–6.5)
NEUTROPHILS NFR BLD AUTO: 73.3 % — SIGNIFICANT CHANGE UP (ref 42.2–75.2)
NRBC # BLD: 0 /100 WBCS — SIGNIFICANT CHANGE UP (ref 0–0)
PLATELET # BLD AUTO: 412 K/UL — HIGH (ref 130–400)
POTASSIUM SERPL-MCNC: 4.1 MMOL/L — SIGNIFICANT CHANGE UP (ref 3.5–5)
POTASSIUM SERPL-SCNC: 4.1 MMOL/L — SIGNIFICANT CHANGE UP (ref 3.5–5)
PROT SERPL-MCNC: 5.7 G/DL — LOW (ref 6–8)
PROTHROM AB SERPL-ACNC: 16.8 SEC — HIGH (ref 9.95–12.87)
RBC # BLD: 4.45 M/UL — LOW (ref 4.7–6.1)
RBC # FLD: 21.7 % — HIGH (ref 11.5–14.5)
SODIUM SERPL-SCNC: 135 MMOL/L — SIGNIFICANT CHANGE UP (ref 135–146)
WBC # BLD: 9.84 K/UL — SIGNIFICANT CHANGE UP (ref 4.8–10.8)
WBC # FLD AUTO: 9.84 K/UL — SIGNIFICANT CHANGE UP (ref 4.8–10.8)

## 2020-02-11 PROCEDURE — 71045 X-RAY EXAM CHEST 1 VIEW: CPT | Mod: 26

## 2020-02-11 PROCEDURE — 99231 SBSQ HOSP IP/OBS SF/LOW 25: CPT | Mod: 57

## 2020-02-11 PROCEDURE — 33225 L VENTRIC PACING LEAD ADD-ON: CPT

## 2020-02-11 PROCEDURE — 33249 INSJ/RPLCMT DEFIB W/LEAD(S): CPT

## 2020-02-11 PROCEDURE — 99233 SBSQ HOSP IP/OBS HIGH 50: CPT

## 2020-02-11 RX ORDER — TAMSULOSIN HYDROCHLORIDE 0.4 MG/1
0.4 CAPSULE ORAL AT BEDTIME
Refills: 0 | Status: DISCONTINUED | OUTPATIENT
Start: 2020-02-11 | End: 2020-02-14

## 2020-02-11 RX ORDER — INSULIN LISPRO 100/ML
VIAL (ML) SUBCUTANEOUS
Refills: 0 | Status: DISCONTINUED | OUTPATIENT
Start: 2020-02-11 | End: 2020-02-14

## 2020-02-11 RX ORDER — ALPRAZOLAM 0.25 MG
0.25 TABLET ORAL DAILY
Refills: 0 | Status: DISCONTINUED | OUTPATIENT
Start: 2020-02-11 | End: 2020-02-14

## 2020-02-11 RX ORDER — ONDANSETRON 8 MG/1
4 TABLET, FILM COATED ORAL ONCE
Refills: 0 | Status: DISCONTINUED | OUTPATIENT
Start: 2020-02-11 | End: 2020-02-11

## 2020-02-11 RX ORDER — CEFAZOLIN SODIUM 1 G
1000 VIAL (EA) INJECTION EVERY 8 HOURS
Refills: 0 | Status: DISCONTINUED | OUTPATIENT
Start: 2020-02-11 | End: 2020-02-14

## 2020-02-11 RX ORDER — SODIUM CHLORIDE 9 MG/ML
1000 INJECTION, SOLUTION INTRAVENOUS
Refills: 0 | Status: DISCONTINUED | OUTPATIENT
Start: 2020-02-11 | End: 2020-02-14

## 2020-02-11 RX ORDER — INSULIN LISPRO 100/ML
3 VIAL (ML) SUBCUTANEOUS
Refills: 0 | Status: DISCONTINUED | OUTPATIENT
Start: 2020-02-11 | End: 2020-02-14

## 2020-02-11 RX ORDER — DIGOXIN 250 MCG
0.12 TABLET ORAL DAILY
Refills: 0 | Status: DISCONTINUED | OUTPATIENT
Start: 2020-02-11 | End: 2020-02-14

## 2020-02-11 RX ORDER — PANTOPRAZOLE SODIUM 20 MG/1
40 TABLET, DELAYED RELEASE ORAL
Refills: 0 | Status: DISCONTINUED | OUTPATIENT
Start: 2020-02-11 | End: 2020-02-14

## 2020-02-11 RX ORDER — DEXTROSE 50 % IN WATER 50 %
25 SYRINGE (ML) INTRAVENOUS ONCE
Refills: 0 | Status: DISCONTINUED | OUTPATIENT
Start: 2020-02-11 | End: 2020-02-14

## 2020-02-11 RX ORDER — DEXTROSE 50 % IN WATER 50 %
15 SYRINGE (ML) INTRAVENOUS ONCE
Refills: 0 | Status: DISCONTINUED | OUTPATIENT
Start: 2020-02-11 | End: 2020-02-14

## 2020-02-11 RX ORDER — MORPHINE SULFATE 50 MG/1
2 CAPSULE, EXTENDED RELEASE ORAL
Refills: 0 | Status: DISCONTINUED | OUTPATIENT
Start: 2020-02-11 | End: 2020-02-11

## 2020-02-11 RX ORDER — DULOXETINE HYDROCHLORIDE 30 MG/1
90 CAPSULE, DELAYED RELEASE ORAL DAILY
Refills: 0 | Status: DISCONTINUED | OUTPATIENT
Start: 2020-02-11 | End: 2020-02-14

## 2020-02-11 RX ORDER — ACETAMINOPHEN 500 MG
650 TABLET ORAL EVERY 6 HOURS
Refills: 0 | Status: DISCONTINUED | OUTPATIENT
Start: 2020-02-11 | End: 2020-02-14

## 2020-02-11 RX ORDER — ATORVASTATIN CALCIUM 80 MG/1
40 TABLET, FILM COATED ORAL AT BEDTIME
Refills: 0 | Status: DISCONTINUED | OUTPATIENT
Start: 2020-02-11 | End: 2020-02-14

## 2020-02-11 RX ORDER — ASPIRIN/CALCIUM CARB/MAGNESIUM 324 MG
81 TABLET ORAL DAILY
Refills: 0 | Status: DISCONTINUED | OUTPATIENT
Start: 2020-02-11 | End: 2020-02-14

## 2020-02-11 RX ORDER — GLUCAGON INJECTION, SOLUTION 0.5 MG/.1ML
1 INJECTION, SOLUTION SUBCUTANEOUS ONCE
Refills: 0 | Status: DISCONTINUED | OUTPATIENT
Start: 2020-02-11 | End: 2020-02-14

## 2020-02-11 RX ORDER — METOPROLOL TARTRATE 50 MG
150 TABLET ORAL DAILY
Refills: 0 | Status: DISCONTINUED | OUTPATIENT
Start: 2020-02-11 | End: 2020-02-14

## 2020-02-11 RX ADMIN — Medication 3 UNIT(S): at 08:09

## 2020-02-11 RX ADMIN — PANTOPRAZOLE SODIUM 40 MILLIGRAM(S): 20 TABLET, DELAYED RELEASE ORAL at 05:33

## 2020-02-11 RX ADMIN — Medication 100 MILLIGRAM(S): at 21:54

## 2020-02-11 RX ADMIN — Medication 150 MILLIGRAM(S): at 05:33

## 2020-02-11 RX ADMIN — Medication 3 UNIT(S): at 17:11

## 2020-02-11 RX ADMIN — Medication 650 MILLIGRAM(S): at 00:30

## 2020-02-11 RX ADMIN — Medication 300 MILLIGRAM(S): at 22:29

## 2020-02-11 RX ADMIN — Medication 0.12 MILLIGRAM(S): at 17:09

## 2020-02-11 RX ADMIN — Medication 650 MILLIGRAM(S): at 17:59

## 2020-02-11 RX ADMIN — Medication 300 MILLIGRAM(S): at 17:09

## 2020-02-11 RX ADMIN — Medication 650 MILLIGRAM(S): at 05:13

## 2020-02-11 RX ADMIN — Medication 650 MILLIGRAM(S): at 17:10

## 2020-02-11 RX ADMIN — TAMSULOSIN HYDROCHLORIDE 0.4 MILLIGRAM(S): 0.4 CAPSULE ORAL at 22:30

## 2020-02-11 RX ADMIN — ATORVASTATIN CALCIUM 40 MILLIGRAM(S): 80 TABLET, FILM COATED ORAL at 22:30

## 2020-02-11 RX ADMIN — Medication 650 MILLIGRAM(S): at 23:01

## 2020-02-11 RX ADMIN — Medication 300 MILLIGRAM(S): at 05:33

## 2020-02-11 RX ADMIN — Medication 650 MILLIGRAM(S): at 05:41

## 2020-02-11 RX ADMIN — Medication 4: at 08:08

## 2020-02-11 RX ADMIN — Medication 0.12 MILLIGRAM(S): at 05:34

## 2020-02-11 RX ADMIN — Medication 2: at 17:11

## 2020-02-11 NOTE — PRE-ANESTHESIA EVALUATION ADULT - NSANTHPEFT_GEN_ALL_CORE
Awake alert and oriented   Lungs- clear  Heart-S1, S2
Lungs clear to auscultation   Heart sounds - no murmur  Abdomen SNT
W GELY L

## 2020-02-11 NOTE — PROGRESS NOTE ADULT - SUBJECTIVE AND OBJECTIVE BOX
CTS Attending    Patient interviewed and examined  He is referred by Dr. Mejia for implantation of Biv ICD  Procedure explained in detail, including risks, benefits and alternatives and patient agreed to proceed with Biv-ICD implantation, possible left thoracotomy.

## 2020-02-11 NOTE — PRE-ANESTHESIA EVALUATION ADULT - NSANTHOSAYNRD_GEN_A_CORE
No. TIAGO screening performed.  STOP BANG Legend: 0-2 = LOW Risk; 3-4 = INTERMEDIATE Risk; 5-8 = HIGH Risk

## 2020-02-11 NOTE — PROGRESS NOTE ADULT - SUBJECTIVE AND OBJECTIVE BOX
SUBJECTIVE:    Patient is a 64y old Male who presents with a chief complaint of intertrochanteric fracture of right hip (10 Feb 2020 16:31)    Overnight Events:  Patient was seen at bed side this morning.  p    PAST MEDICAL & SURGICAL HISTORY  Diabetic neuropathy  STEMI (ST elevation myocardial infarction)  Diverticulitis  MRSA bacteremia  History of celiac disease  CHF (congestive heart failure): EF ~ 25%  HTN (hypertension)  Diabetes: on insulin pump  Blood clot due to device, implant, or graft: was on blood thinners  HLD (hyperlipidemia)  Osteoarthritis  Atherosclerosis of coronary artery: CAD (coronary artery disease)  Status post percutaneous transluminal coronary angioplasty: in 2012  Surgery, elective: Right shoulder  Surgery, elective: right knee wound debridement  S/P TKR (total knee replacement), right: with infection Mrsa   per pt he was cleared from MRSA infection  S/P CABG x 1: 2018  Stented coronary artery: 10/18 heart attack  INFERIOR WALL MI  Other postprocedural status: Fixation hardware in foot LEFT    SOCIAL HISTORY:  Negative for smoking/alcohol/drug use.     ALLERGIES:  ACE inhibitors (Hives)  enalapril (Hives)  rifampin (Angioedema)  vancomycin (Angioedema)    MEDICATIONS:  STANDING MEDICATIONS  sodium chloride 0.9%. 500 milliLiter(s) IV Continuous <Continuous>    PRN MEDICATIONS    VITALS:   T(F): 97.3  HR: 74  BP: 111/59  RR: 18  SpO2: 95%    02-10-20 @ 07:01  -  02-11-20 @ 07:00  --------------------------------------------------------  IN: 935 mL / OUT: 550 mL / NET: 385 mL      PHYSICAL EXAM:  . General: NAD  . HEENT  · Respiratory: Breath Sounds equal & clear to  auscultation, no accessory muscle use  · Cardiovascular: Regular rate & rhythm, normal S1, S2; no murmurs, gallops or rubs; no S3, S4  · Gastrointestinal	Soft, non-tender, no hepatosplenomegaly, normal bowel sounds  · Genitourinary: Normal genitalia; no lesions; no discharge  · Extremities:	No cyanosis, clubbing or edema  · Skin no macular rashs, no lacerations.   . Neuro:    LABS:                        9.8    9.84  )-----------( 412      ( 11 Feb 2020 06:49 )             31.9     02-11    135  |  92<L>  |  21<H>  ----------------------------<  163<H>  4.1   |  30  |  0.9    Ca    8.8      11 Feb 2020 06:49  Mg     1.7     02-11    TPro  5.7<L>  /  Alb  3.1<L>  /  TBili  2.3<H>  /  DBili  x   /  AST  20  /  ALT  14  /  AlkPhos  99  02-11    PT/INR - ( 11 Feb 2020 06:49 )   PT: 16.80 sec;   INR: 1.46 ratio         PTT - ( 11 Feb 2020 06:49 )  PTT:25.7 sec                  02-10-20 @ 07:01  -  02-11-20 @ 07:00  --------------------------------------------------------  IN: 935 mL / OUT: 550 mL / NET: 385 mL          Radiology: SUBJECTIVE:    Patient is a 64y old Male who presents with a chief complaint of intertrochanteric fracture of right hip (10 Feb 2020 16:31)    Overnight Events:  Patient was seen at bed side this morning.  patient denied chest pain , sob, n/v abdominal pain. patient complains of pain in rle.     PAST MEDICAL & SURGICAL HISTORY  Diabetic neuropathy  STEMI (ST elevation myocardial infarction)  Diverticulitis  MRSA bacteremia  History of celiac disease  CHF (congestive heart failure): EF ~ 25%  HTN (hypertension)  Diabetes: on insulin pump  Blood clot due to device, implant, or graft: was on blood thinners  HLD (hyperlipidemia)  Osteoarthritis  Atherosclerosis of coronary artery: CAD (coronary artery disease)  Status post percutaneous transluminal coronary angioplasty: in 2012  Surgery, elective: Right shoulder  Surgery, elective: right knee wound debridement  S/P TKR (total knee replacement), right: with infection Mrsa   per pt he was cleared from MRSA infection  S/P CABG x 1: 2018  Stented coronary artery: 10/18 heart attack  INFERIOR WALL MI  Other postprocedural status: Fixation hardware in foot LEFT    SOCIAL HISTORY:  Negative for smoking/alcohol/drug use.     ALLERGIES:  ACE inhibitors (Hives)  enalapril (Hives)  rifampin (Angioedema)  vancomycin (Angioedema)    MEDICATIONS:  STANDING MEDICATIONS  sodium chloride 0.9%. 500 milliLiter(s) IV Continuous <Continuous>    PRN MEDICATIONS    VITALS:   T(F): 97.3  HR: 74  BP: 111/59  RR: 18  SpO2: 95%    02-10-20 @ 07:01  -  02-11-20 @ 07:00  --------------------------------------------------------  IN: 935 mL / OUT: 550 mL / NET: 385 mL      PHYSICAL EXAM:  CONSTITUTIONAL: No acute distress, well-developed, well-groomed, AAOx3, Loja catheter in place  HEAD: Atraumatic, normocephalic  EYES: EOM intact, PERRLA, conjunctiva and sclera clear  ENT: Supple, no masses, no thyromegaly, no bruits, no JVD; moist mucous membranes  PULMONARY: Clear to auscultation bilaterally; no wheezes, rales, or rhonchi  CARDIOVASCULAR: Regular rate and rhythm; no murmurs, rubs, or gallops  GASTROINTESTINAL: Soft, non-tender, non-distended; bowel sounds present  MUSCULOSKELETAL: 2+ peripheral pulses; no clubbing, no cyanosis, no edema  NEUROLOGY: non-focal  SKIN: right anterior thigh wound wrapped in dressing, several surgical incisions along lateral right thigh with staples    LABS:                        9.8    9.84  )-----------( 412      ( 11 Feb 2020 06:49 )             31.9     02-11    135  |  92<L>  |  21<H>  ----------------------------<  163<H>  4.1   |  30  |  0.9    Ca    8.8      11 Feb 2020 06:49  Mg     1.7     02-11    TPro  5.7<L>  /  Alb  3.1<L>  /  TBili  2.3<H>  /  DBili  x   /  AST  20  /  ALT  14  /  AlkPhos  99  02-11    PT/INR - ( 11 Feb 2020 06:49 )   PT: 16.80 sec;   INR: 1.46 ratio         PTT - ( 11 Feb 2020 06:49 )  PTT:25.7 sec                  02-10-20 @ 07:01  -  02-11-20 @ 07:00  --------------------------------------------------------  IN: 935 mL / OUT: 550 mL / NET: 385 mL          Radiology:

## 2020-02-11 NOTE — PRE-ANESTHESIA EVALUATION ADULT - NSANTHAIRWAYFT_ENT_ALL_CORE
FROM of cervical spine   TMD 3 FB  MOuth opening 3 FB
Good ROM neck, supple, soft submental tissue, good mouth opening

## 2020-02-11 NOTE — BRIEF OPERATIVE NOTE - NSICDXBRIEFPREOP_GEN_ALL_CORE_FT
PRE-OP DIAGNOSIS:  Wound 04-Feb-2020 17:20:57 right knee Hector Vega
PRE-OP DIAGNOSIS:  Intertrochanteric fracture of right hip 31-Jan-2020 17:28:01  DayRanjith
PRE-OP DIAGNOSIS:  LBBB (left bundle branch block) 11-Feb-2020 15:29:27  Roney Fernandes  CHF with cardiomyopathy 11-Feb-2020 15:29:12  Roney Fernandes

## 2020-02-11 NOTE — BRIEF OPERATIVE NOTE - NSICDXBRIEFPOSTOP_GEN_ALL_CORE_FT
POST-OP DIAGNOSIS:  Wound 04-Feb-2020 17:21:20 right knee Hector Vega
POST-OP DIAGNOSIS:  Intertrochanteric fracture of right hip 31-Jan-2020 17:28:11  DayRanjith
POST-OP DIAGNOSIS:  Ischemic cardiomyopathy 11-Feb-2020 15:30:06  Roney Fernandes  LBBB (left bundle branch block) 11-Feb-2020 15:29:52  Roney Fernandes  CHF with cardiomyopathy 11-Feb-2020 15:29:45  Roney Feranndes

## 2020-02-11 NOTE — BRIEF OPERATIVE NOTE - NSICDXBRIEFPROCEDURE_GEN_ALL_CORE_FT
PROCEDURES:  Selective debridement of wound 04-Feb-2020 17:19:58 debridement and skin graft right knee, right thigh donor, wound vac EXPAREL Hector Vega
PROCEDURES:  Open reduction and internal fixation of right hip using intramedullary hip screw 31-Jan-2020 17:27:34  Ranjith Hassan
PROCEDURES:  Insertion, AICD, biventricular 11-Feb-2020 15:30:23  Roney Fernandes
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
## 2020-02-11 NOTE — PROGRESS NOTE ADULT - SUBJECTIVE AND OBJECTIVE BOX
Denies any chest pain, SOB or palpitations.   He is complaining of pain at the surgery site     MEDICATIONS  (STANDING):  acetaminophen   Tablet .. 650 milliGRAM(s) Oral every 6 hours  ALPRAZolam 0.25 milliGRAM(s) Oral daily  aspirin enteric coated 81 milliGRAM(s) Oral daily  atorvastatin 40 milliGRAM(s) Oral at bedtime  ceFAZolin   IVPB 1000 milliGRAM(s) IV Intermittent every 8 hours  clindamycin   Capsule 300 milliGRAM(s) Oral every 8 hours  dextrose 5%. 1000 milliLiter(s) (50 mL/Hr) IV Continuous <Continuous>  dextrose 50% Injectable 25 Gram(s) IV Push once  digoxin     Tablet 0.125 milliGRAM(s) Oral daily  DULoxetine 90 milliGRAM(s) Oral daily  insulin lispro (HumaLOG) corrective regimen sliding scale   SubCutaneous three times a day before meals  insulin lispro Injectable (HumaLOG) 3 Unit(s) SubCutaneous before breakfast  insulin lispro Injectable (HumaLOG) 3 Unit(s) SubCutaneous before lunch  insulin lispro Injectable (HumaLOG) 3 Unit(s) SubCutaneous before dinner  lactated ringers. 1000 milliLiter(s) (20 mL/Hr) IV Continuous <Continuous>  metolazone 2.5 milliGRAM(s) Oral daily  metoprolol succinate  milliGRAM(s) Oral daily  pantoprazole    Tablet 40 milliGRAM(s) Oral before breakfast  sodium chloride 0.9%. 500 milliLiter(s) (500 mL/Hr) IV Continuous <Continuous>  tamsulosin 0.4 milliGRAM(s) Oral at bedtime    MEDICATIONS  (PRN):  dextrose 40% Gel 15 Gram(s) Oral once PRN Blood Glucose LESS THAN 70 milliGRAM(s)/deciliter  glucagon  Injectable 1 milliGRAM(s) IntraMuscular once PRN Glucose LESS THAN 70 milligrams/deciliter            Vital Signs Last 24 Hrs  T(C): 35.8 (11 Feb 2020 15:52), Max: 36.7 (11 Feb 2020 14:07)  T(F): 96.5 (11 Feb 2020 15:52), Max: 98 (11 Feb 2020 14:07)  HR: 69 (11 Feb 2020 15:52) (65 - 74)  BP: 117/58 (11 Feb 2020 15:52) (106/61 - 146/64)  BP(mean): --  RR: 18 (11 Feb 2020 15:52) (11 - 19)  SpO2: 99% (11 Feb 2020 19:52) (95% - 100%)     REVIEW OF SYSTEMS:  CONSTITUTIONAL: No fever, weight loss, or fatigue  CARDIOLOGY: PAtient denies chest pain, shortness of breath or syncopal episodes.   RESPIRATORY: denies shortness of breath, wheezeing.   NEUROLOGICAL: NO weakness, no focal deficits to report.  GI: no BRBPR, no N,V,diarrhea.    PSYCHIATRY: normal mood and affect  HEENT: no nasal discharge, no ecchymosis  SKIN: no ecchymosis, no breakdown  MUSCULOSKELETAL: Full range of motion x4.        PHYSICAL EXAM:  · CONSTITUTIONAL:	Well-developed, well nourished    BMI-  ·RESPIRATORY:   airway patent; breath sounds equal; good air movement; respirations non-labored; clear to auscultation bilaterally; no chest wall tenderness; no intercostal retractions; no rales,rhonchi or wheeze  · CARDIOVASCULAR	regular rate and rhythm  no rub  no murmur  normal PMI  · EXTREMITIES: No cyanosis, clubbing or edema  · VASCULAR: 	Equal and normal pulses (carotid, femoral, dorsalis pedis)  	          LABS:                        9.8    9.84  )-----------( 412      ( 11 Feb 2020 06:49 )             31.9     02-11    135  |  92<L>  |  21<H>  ----------------------------<  163<H>  4.1   |  30  |  0.9    Ca    8.8      11 Feb 2020 06:49  Mg     1.7     02-11    TPro  5.7<L>  /  Alb  3.1<L>  /  TBili  2.3<H>  /  DBili  x   /  AST  20  /  ALT  14  /  AlkPhos  99  02-11        PT/INR - ( 11 Feb 2020 06:49 )   PT: 16.80 sec;   INR: 1.46 ratio         PTT - ( 11 Feb 2020 06:49 )  PTT:25.7 sec    I&O's Summary    10 Feb 2020 07:01  -  11 Feb 2020 07:00  --------------------------------------------------------  IN: 935 mL / OUT: 550 mL / NET: 385 mL    11 Feb 2020 07:01  -  11 Feb 2020 19:53  --------------------------------------------------------  IN: 0 mL / OUT: 600 mL / NET: -600 mL      BNP  RADIOLOGY & ADDITIONAL STUDIES:    IMPRESSION AND PLAN:    Continue current care  DAPT  DVT/GI prophylaxis   Will F/U
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
## 2020-02-11 NOTE — PRE-ANESTHESIA EVALUATION ADULT - MALLAMPATI CLASS
Class II - visualization of the soft palate, fauces, and uvula
Class III - visualization of the soft palate and the base of the uvula
Class II - visualization of the soft palate, fauces, and uvula

## 2020-02-11 NOTE — PROGRESS NOTE ADULT - SUBJECTIVE AND OBJECTIVE BOX
FLOWER MARISCAL  64y Male    CHIEF COMPLAINT:    Patient is a 64y old  Male who presents with a chief complaint of intertrochanteric fracture of right hip (2020 11:44)      INTERVAL HPI/OVERNIGHT EVENTS:    Patient seen and examined. C/O pain in his Rt leg. Scheduled for AICD today. No sob. No palpitations.     ROS: All other systems are negative.    Vital Signs:    T(F): 98 (20 @ 14:07), Max: 98 (20 @ 14:07)  HR: 66 (20 @ 15:00) (65 - 74)  BP: 106/61 (20 @ 15:00) (106/61 - 146/64)  RR: 19 (20 @ 15:00) (11 - 19)  SpO2: 99% (20 @ 15:00) (95% - 100%)  I&O's Summary    10 Feb 2020 07:  -  2020 07:00  --------------------------------------------------------  IN: 935 mL / OUT: 550 mL / NET: 385 mL    2020 07:01  -  2020 15:23  --------------------------------------------------------  IN: 0 mL / OUT: 400 mL / NET: -400 mL      Daily Height in cm: 187.96 (2020 11:30)    Daily Weight in k.5 (2020 05:21)  CAPILLARY BLOOD GLUCOSE      POCT Blood Glucose.: 229 mg/dL (2020 07:38)  POCT Blood Glucose.: 165 mg/dL (2020 06:27)  POCT Blood Glucose.: 219 mg/dL (10 Feb 2020 21:13)  POCT Blood Glucose.: 252 mg/dL (10 Feb 2020 19:01)  POCT Blood Glucose.: 219 mg/dL (10 Feb 2020 16:50)      PHYSICAL EXAM:    GENERAL:  NAD  SKIN: No rashes or lesions  HENT: Atraumatic Normocephalic. PERRL. Moist membranes.  NECK: Supple, No JVD. No lymphadenopathy.  PULMONARY: CTA B/L. No wheezing. No rales  CVS: Normal S1, S2. Rate and Rhythm are regular. No murmurs.  ABDOMEN/GI: Soft, Nontender, Nondistended; BS present  EXTREMITIES: Peripheral pulses intact. No edema B/L LE. Dressing over Rt leg.   NEUROLOGIC:  No motor or sensory deficit.  PSYCH: Alert & oriented x 3    Consultant(s) Notes Reviewed:  [x ] YES  [ ] NO  Care Discussed with Consultants/Other Providers [ x] YES  [ ] NO    EKG reviewed  Telemetry reviewed    LABS:                        9.8    9.84  )-----------( 412      ( 2020 06:49 )             31.9     02-11    135  |  92<L>  |  21<H>  ----------------------------<  163<H>  4.1   |  30  |  0.9    Ca    8.8      2020 06:49  Mg     1.7     02-11    TPro  5.7<L>  /  Alb  3.1<L>  /  TBili  2.3<H>  /  DBili  x   /  AST  20  /  ALT  14  /  AlkPhos  99  02-11    PT/INR - ( 2020 06:49 )   PT: 16.80 sec;   INR: 1.46 ratio         PTT - ( 2020 06:49 )  PTT:25.7 sec    Trop 0.03, CKMB 5.8, CK --, 20 @ 01:17        RADIOLOGY & ADDITIONAL TESTS:      Imaging or report Personally Reviewed:  [ ] YES  [ ] NO    Medications:  Standing  acetaminophen   Tablet .. 650 milliGRAM(s) Oral every 6 hours  ALPRAZolam 0.25 milliGRAM(s) Oral daily  aspirin enteric coated 81 milliGRAM(s) Oral daily  atorvastatin 40 milliGRAM(s) Oral at bedtime  ceFAZolin   IVPB 1000 milliGRAM(s) IV Intermittent every 8 hours  clindamycin   Capsule 300 milliGRAM(s) Oral every 8 hours  dextrose 5%. 1000 milliLiter(s) IV Continuous <Continuous>  dextrose 50% Injectable 25 Gram(s) IV Push once  digoxin     Tablet 0.125 milliGRAM(s) Oral daily  DULoxetine 90 milliGRAM(s) Oral daily  insulin lispro (HumaLOG) corrective regimen sliding scale   SubCutaneous three times a day before meals  insulin lispro Injectable (HumaLOG) 3 Unit(s) SubCutaneous before breakfast  insulin lispro Injectable (HumaLOG) 3 Unit(s) SubCutaneous before lunch  insulin lispro Injectable (HumaLOG) 3 Unit(s) SubCutaneous before dinner  lactated ringers. 1000 milliLiter(s) IV Continuous <Continuous>  metolazone 2.5 milliGRAM(s) Oral daily  metoprolol succinate  milliGRAM(s) Oral daily  pantoprazole    Tablet 40 milliGRAM(s) Oral before breakfast  sodium chloride 0.9%. 500 milliLiter(s) IV Continuous <Continuous>  tamsulosin 0.4 milliGRAM(s) Oral at bedtime    PRN Meds  dextrose 40% Gel 15 Gram(s) Oral once PRN  glucagon  Injectable 1 milliGRAM(s) IntraMuscular once PRN  morphine  - Injectable 2 milliGRAM(s) IV Push every 15 minutes PRN  ondansetron Injectable 4 milliGRAM(s) IV Push once PRN      Case discussed with resident    Care discussed with pt/family

## 2020-02-12 PROBLEM — E11.9 TYPE 2 DIABETES MELLITUS WITHOUT COMPLICATIONS: Chronic | Status: ACTIVE | Noted: 2018-11-25

## 2020-02-12 PROBLEM — I50.9 HEART FAILURE, UNSPECIFIED: Chronic | Status: ACTIVE | Noted: 2019-12-03

## 2020-02-12 LAB
ALBUMIN SERPL ELPH-MCNC: 3 G/DL — LOW (ref 3.5–5.2)
ALP SERPL-CCNC: 104 U/L — SIGNIFICANT CHANGE UP (ref 30–115)
ALT FLD-CCNC: 11 U/L — SIGNIFICANT CHANGE UP (ref 0–41)
ANION GAP SERPL CALC-SCNC: 13 MMOL/L — SIGNIFICANT CHANGE UP (ref 7–14)
AST SERPL-CCNC: 18 U/L — SIGNIFICANT CHANGE UP (ref 0–41)
BASOPHILS # BLD AUTO: 0.04 K/UL — SIGNIFICANT CHANGE UP (ref 0–0.2)
BASOPHILS NFR BLD AUTO: 0.4 % — SIGNIFICANT CHANGE UP (ref 0–1)
BILIRUB SERPL-MCNC: 2.2 MG/DL — HIGH (ref 0.2–1.2)
BUN SERPL-MCNC: 12 MG/DL — SIGNIFICANT CHANGE UP (ref 10–20)
CALCIUM SERPL-MCNC: 8.6 MG/DL — SIGNIFICANT CHANGE UP (ref 8.5–10.1)
CHLORIDE SERPL-SCNC: 90 MMOL/L — LOW (ref 98–110)
CO2 SERPL-SCNC: 31 MMOL/L — SIGNIFICANT CHANGE UP (ref 17–32)
CREAT SERPL-MCNC: 0.7 MG/DL — SIGNIFICANT CHANGE UP (ref 0.7–1.5)
EOSINOPHIL # BLD AUTO: 0.23 K/UL — SIGNIFICANT CHANGE UP (ref 0–0.7)
EOSINOPHIL NFR BLD AUTO: 2.1 % — SIGNIFICANT CHANGE UP (ref 0–8)
GLUCOSE BLDC GLUCOMTR-MCNC: 154 MG/DL — HIGH (ref 70–99)
GLUCOSE BLDC GLUCOMTR-MCNC: 201 MG/DL — HIGH (ref 70–99)
GLUCOSE BLDC GLUCOMTR-MCNC: 218 MG/DL — HIGH (ref 70–99)
GLUCOSE BLDC GLUCOMTR-MCNC: 253 MG/DL — HIGH (ref 70–99)
GLUCOSE BLDC GLUCOMTR-MCNC: 322 MG/DL — HIGH (ref 70–99)
GLUCOSE SERPL-MCNC: 166 MG/DL — HIGH (ref 70–99)
HCT VFR BLD CALC: 33 % — LOW (ref 42–52)
HGB BLD-MCNC: 10.3 G/DL — LOW (ref 14–18)
IMM GRANULOCYTES NFR BLD AUTO: 0.4 % — HIGH (ref 0.1–0.3)
LYMPHOCYTES # BLD AUTO: 0.95 K/UL — LOW (ref 1.2–3.4)
LYMPHOCYTES # BLD AUTO: 8.7 % — LOW (ref 20.5–51.1)
MAGNESIUM SERPL-MCNC: 1.7 MG/DL — LOW (ref 1.8–2.4)
MCHC RBC-ENTMCNC: 22.2 PG — LOW (ref 27–31)
MCHC RBC-ENTMCNC: 31.2 G/DL — LOW (ref 32–37)
MCV RBC AUTO: 71 FL — LOW (ref 80–94)
MONOCYTES # BLD AUTO: 1.56 K/UL — HIGH (ref 0.1–0.6)
MONOCYTES NFR BLD AUTO: 14.2 % — HIGH (ref 1.7–9.3)
NEUTROPHILS # BLD AUTO: 8.13 K/UL — HIGH (ref 1.4–6.5)
NEUTROPHILS NFR BLD AUTO: 74.2 % — SIGNIFICANT CHANGE UP (ref 42.2–75.2)
NRBC # BLD: 0 /100 WBCS — SIGNIFICANT CHANGE UP (ref 0–0)
PLATELET # BLD AUTO: 380 K/UL — SIGNIFICANT CHANGE UP (ref 130–400)
POTASSIUM SERPL-MCNC: 3.9 MMOL/L — SIGNIFICANT CHANGE UP (ref 3.5–5)
POTASSIUM SERPL-SCNC: 3.9 MMOL/L — SIGNIFICANT CHANGE UP (ref 3.5–5)
PROT SERPL-MCNC: 5.7 G/DL — LOW (ref 6–8)
RBC # BLD: 4.65 M/UL — LOW (ref 4.7–6.1)
RBC # FLD: 21.5 % — HIGH (ref 11.5–14.5)
SODIUM SERPL-SCNC: 134 MMOL/L — LOW (ref 135–146)
WBC # BLD: 10.95 K/UL — HIGH (ref 4.8–10.8)
WBC # FLD AUTO: 10.95 K/UL — HIGH (ref 4.8–10.8)

## 2020-02-12 PROCEDURE — 99232 SBSQ HOSP IP/OBS MODERATE 35: CPT

## 2020-02-12 PROCEDURE — 71046 X-RAY EXAM CHEST 2 VIEWS: CPT | Mod: 26

## 2020-02-12 PROCEDURE — 99233 SBSQ HOSP IP/OBS HIGH 50: CPT

## 2020-02-12 RX ORDER — FUROSEMIDE 40 MG
40 TABLET ORAL DAILY
Refills: 0 | Status: DISCONTINUED | OUTPATIENT
Start: 2020-02-12 | End: 2020-02-14

## 2020-02-12 RX ORDER — MAGNESIUM OXIDE 400 MG ORAL TABLET 241.3 MG
400 TABLET ORAL
Refills: 0 | Status: DISCONTINUED | OUTPATIENT
Start: 2020-02-12 | End: 2020-02-14

## 2020-02-12 RX ORDER — INSULIN LISPRO 100/ML
5 VIAL (ML) SUBCUTANEOUS ONCE
Refills: 0 | Status: COMPLETED | OUTPATIENT
Start: 2020-02-12 | End: 2020-02-12

## 2020-02-12 RX ADMIN — Medication 300 MILLIGRAM(S): at 06:12

## 2020-02-12 RX ADMIN — Medication 5 UNIT(S): at 22:29

## 2020-02-12 RX ADMIN — MAGNESIUM OXIDE 400 MG ORAL TABLET 400 MILLIGRAM(S): 241.3 TABLET ORAL at 12:45

## 2020-02-12 RX ADMIN — MAGNESIUM OXIDE 400 MG ORAL TABLET 400 MILLIGRAM(S): 241.3 TABLET ORAL at 18:01

## 2020-02-12 RX ADMIN — ATORVASTATIN CALCIUM 40 MILLIGRAM(S): 80 TABLET, FILM COATED ORAL at 22:30

## 2020-02-12 RX ADMIN — DULOXETINE HYDROCHLORIDE 90 MILLIGRAM(S): 30 CAPSULE, DELAYED RELEASE ORAL at 12:45

## 2020-02-12 RX ADMIN — Medication 4: at 12:40

## 2020-02-12 RX ADMIN — PANTOPRAZOLE SODIUM 40 MILLIGRAM(S): 20 TABLET, DELAYED RELEASE ORAL at 06:11

## 2020-02-12 RX ADMIN — Medication 3 UNIT(S): at 12:41

## 2020-02-12 RX ADMIN — TAMSULOSIN HYDROCHLORIDE 0.4 MILLIGRAM(S): 0.4 CAPSULE ORAL at 22:30

## 2020-02-12 RX ADMIN — Medication 650 MILLIGRAM(S): at 06:11

## 2020-02-12 RX ADMIN — Medication 650 MILLIGRAM(S): at 15:28

## 2020-02-12 RX ADMIN — Medication 150 MILLIGRAM(S): at 06:12

## 2020-02-12 RX ADMIN — Medication 650 MILLIGRAM(S): at 23:25

## 2020-02-12 RX ADMIN — Medication 650 MILLIGRAM(S): at 22:32

## 2020-02-12 RX ADMIN — Medication 650 MILLIGRAM(S): at 08:27

## 2020-02-12 RX ADMIN — Medication 100 MILLIGRAM(S): at 06:11

## 2020-02-12 RX ADMIN — Medication 300 MILLIGRAM(S): at 14:33

## 2020-02-12 RX ADMIN — Medication 40 MILLIGRAM(S): at 14:16

## 2020-02-12 RX ADMIN — Medication 650 MILLIGRAM(S): at 14:17

## 2020-02-12 RX ADMIN — Medication 0.12 MILLIGRAM(S): at 06:12

## 2020-02-12 RX ADMIN — Medication 81 MILLIGRAM(S): at 14:18

## 2020-02-12 RX ADMIN — Medication 0.25 MILLIGRAM(S): at 14:17

## 2020-02-12 RX ADMIN — Medication 3 UNIT(S): at 17:58

## 2020-02-12 NOTE — ANESTHESIA FOLLOW-UP NOTE - NSEVALATION_GEN_ALL_CORE
All questions were answered/No apparent complications or complaints regarding anesthesia care at this time
No apparent complications or complaints regarding anesthesia care at this time
No apparent complications or complaints regarding anesthesia care at this time

## 2020-02-12 NOTE — PROGRESS NOTE ADULT - SUBJECTIVE AND OBJECTIVE BOX
OPERATIVE PROCEDURE(s):  BIV ICD              POD # 1                       64yMale  SURGEON(s): Dom  SUBJECTIVE ASSESSMENT: pt seen and examined. no acute complaints   Vital Signs Last 24 Hrs  T(F): 97.3 (12 Feb 2020 13:50), Max: 97.4 (12 Feb 2020 05:10)  HR: 73 (12 Feb 2020 13:50) (69 - 77)  BP: 113/61 (12 Feb 2020 13:50) (113/56 - 117/58)  RR: 19 (12 Feb 2020 13:50) (18 - 19)  SpO2: 94% (12 Feb 2020 05:10) (94% - 99%)    I&O's Detail    11 Feb 2020 07:01  -  12 Feb 2020 07:00  --------------------------------------------------------  IN:    Oral Fluid: 180 mL  Total IN: 180 mL    OUT:    Indwelling Catheter - Urethral: 3000 mL  Total OUT: 3000 mL        Net:   I&O's Detail    10 Feb 2020 07:01  -  11 Feb 2020 07:00  --------------------------------------------------------  Total NET: 385 mL      11 Feb 2020 07:01  -  12 Feb 2020 07:00  --------------------------------------------------------  Total NET: -2820 mL        CAPILLARY BLOOD GLUCOSE      POCT Blood Glucose.: 201 mg/dL (12 Feb 2020 11:36)  POCT Blood Glucose.: 154 mg/dL (12 Feb 2020 07:45)  POCT Blood Glucose.: 193 mg/dL (11 Feb 2020 21:25)  POCT Blood Glucose.: 163 mg/dL (11 Feb 2020 16:52)    Physical Exam:  General: NAD; A&Ox3  Cardiac: S1/S2, RRR, no murmur, no rubs  Lungs: unlabored respirations, CTA b/l, no wheeze, no rales, no crackles  Abdomen: Soft/NT/ND; positive bowel sounds x 4  Incisions: Incisions clean/dry/intact  Extremities: No edema b/l lower extremities; good capillary refill; no cyanosis; palpable 1+ pedal pulses b/l          LABS:                        10.3<L>  10.95<H> )-----------( 380      ( 12 Feb 2020 06:22 )             33.0<L>                        9.8<L>  9.84  )-----------( 412<H>    ( 11 Feb 2020 06:49 )             31.9<L>    02-12    134<L>  |  90<L>  |  12  ----------------------------<  166<H>  3.9   |  31  |  0.7  02-11    135  |  92<L>  |  21<H>  ----------------------------<  163<H>  4.1   |  30  |  0.9    Ca    8.6      12 Feb 2020 06:22  Mg     1.7     02-12    TPro  5.7<L> [6.0 - 8.0]  /  Alb  3.0<L> [3.5 - 5.2]  /  TBili  2.2<H> [0.2 - 1.2]  /  DBili  x   /  AST  18 [0 - 41]  /  ALT  11 [0 - 41]  /  AlkPhos  104 [30 - 115]  02-12    PT/INR - ( 11 Feb 2020 06:49 )   PT: ;   INR: 1.46 ratio         PTT - ( 11 Feb 2020 06:49 )  PTT:25.7 sec      RADIOLOGY & ADDITIONAL TESTS:  CXR: < from: Xray Chest 2 Views PA/Lat (02.12.20 @ 13:31) >  Impression:    No radiographic evidence of acute cardiopulmonary disease. No pneumothorax    AICD as above.    < end of copied text >      MEDICATIONS  (STANDING):  acetaminophen   Tablet .. 650 milliGRAM(s) Oral every 6 hours  ALPRAZolam 0.25 milliGRAM(s) Oral daily  aspirin enteric coated 81 milliGRAM(s) Oral daily  atorvastatin 40 milliGRAM(s) Oral at bedtime  ceFAZolin   IVPB 1000 milliGRAM(s) IV Intermittent every 8 hours  clindamycin   Capsule 300 milliGRAM(s) Oral every 8 hours  dextrose 5%. 1000 milliLiter(s) (50 mL/Hr) IV Continuous <Continuous>  dextrose 50% Injectable 25 Gram(s) IV Push once  digoxin     Tablet 0.125 milliGRAM(s) Oral daily  DULoxetine 90 milliGRAM(s) Oral daily  furosemide    Tablet 40 milliGRAM(s) Oral daily  insulin lispro (HumaLOG) corrective regimen sliding scale   SubCutaneous three times a day before meals  insulin lispro Injectable (HumaLOG) 3 Unit(s) SubCutaneous before breakfast  insulin lispro Injectable (HumaLOG) 3 Unit(s) SubCutaneous before lunch  insulin lispro Injectable (HumaLOG) 3 Unit(s) SubCutaneous before dinner  lactated ringers. 1000 milliLiter(s) (20 mL/Hr) IV Continuous <Continuous>  magnesium oxide 400 milliGRAM(s) Oral three times a day with meals  metolazone 2.5 milliGRAM(s) Oral daily  metoprolol succinate  milliGRAM(s) Oral daily  pantoprazole    Tablet 40 milliGRAM(s) Oral before breakfast  sodium chloride 0.9%. 500 milliLiter(s) (500 mL/Hr) IV Continuous <Continuous>  tamsulosin 0.4 milliGRAM(s) Oral at bedtime    MEDICATIONS  (PRN):  dextrose 40% Gel 15 Gram(s) Oral once PRN Blood Glucose LESS THAN 70 milliGRAM(s)/deciliter  glucagon  Injectable 1 milliGRAM(s) IntraMuscular once PRN Glucose LESS THAN 70 milligrams/deciliter    Allergies    ACE inhibitors (Hives)  enalapril (Hives)  rifampin (Angioedema)  vancomycin (Angioedema)    Intolerances      Ambulation/Activity Status: ambulate     Assessment/Plan:  64y Male status-post Biv ICD POD#1  - Case and plan discussed with CTU Intensivist and CT Surgeon - Dr. Maravilla/Dom/Violet   - Continue CTU supportive care    - Continue DVT/GI prophylaxis  - Incentive Spirometry 10 times an hour  - Continue to advance physical activity as tolerated and continue PT/OT as directed  -cont medical therapy as per primary team  -keep tegaderm dressing in place- will remove in office on 2/18/2020 @ 10:00am with Dr Fernandes  -device interrogated and working properly  -recall prn

## 2020-02-12 NOTE — PROGRESS NOTE ADULT - SUBJECTIVE AND OBJECTIVE BOX
INTERVAL HPI/OVERNIGHT EVENTS: One brief episode NSVT overnight (4 beats), otherwise no events and pt feeling well.    MEDICATIONS  (STANDING):  acetaminophen   Tablet .. 650 milliGRAM(s) Oral every 6 hours  ALPRAZolam 0.25 milliGRAM(s) Oral daily  aspirin enteric coated 81 milliGRAM(s) Oral daily  atorvastatin 40 milliGRAM(s) Oral at bedtime  ceFAZolin   IVPB 1000 milliGRAM(s) IV Intermittent every 8 hours  clindamycin   Capsule 300 milliGRAM(s) Oral every 8 hours  dextrose 5%. 1000 milliLiter(s) (50 mL/Hr) IV Continuous <Continuous>  dextrose 50% Injectable 25 Gram(s) IV Push once  digoxin     Tablet 0.125 milliGRAM(s) Oral daily  DULoxetine 90 milliGRAM(s) Oral daily  insulin lispro (HumaLOG) corrective regimen sliding scale   SubCutaneous three times a day before meals  insulin lispro Injectable (HumaLOG) 3 Unit(s) SubCutaneous before breakfast  insulin lispro Injectable (HumaLOG) 3 Unit(s) SubCutaneous before lunch  insulin lispro Injectable (HumaLOG) 3 Unit(s) SubCutaneous before dinner  lactated ringers. 1000 milliLiter(s) (20 mL/Hr) IV Continuous <Continuous>  magnesium oxide 400 milliGRAM(s) Oral three times a day with meals  metolazone 2.5 milliGRAM(s) Oral daily  metoprolol succinate  milliGRAM(s) Oral daily  pantoprazole    Tablet 40 milliGRAM(s) Oral before breakfast  sodium chloride 0.9%. 500 milliLiter(s) (500 mL/Hr) IV Continuous <Continuous>  tamsulosin 0.4 milliGRAM(s) Oral at bedtime    MEDICATIONS  (PRN):  dextrose 40% Gel 15 Gram(s) Oral once PRN Blood Glucose LESS THAN 70 milliGRAM(s)/deciliter  glucagon  Injectable 1 milliGRAM(s) IntraMuscular once PRN Glucose LESS THAN 70 milligrams/deciliter      Allergies  ACE inhibitors (Hives)  enalapril (Hives)  rifampin (Angioedema)  vancomycin (Angioedema)    REVIEW OF SYSTEMS: Denies CP, palpitations, dizziness or SOB    Vital Signs Last 24 Hrs  T(C): 36.3 (12 Feb 2020 05:10), Max: 36.7 (11 Feb 2020 14:07)  T(F): 97.4 (12 Feb 2020 05:10), Max: 98 (11 Feb 2020 14:07)  HR: 77 (12 Feb 2020 05:10) (65 - 77)  BP: 116/57 (12 Feb 2020 05:10) (106/61 - 146/64)  BP(mean): --  RR: 18 (11 Feb 2020 20:10) (11 - 19)  SpO2: 94% (12 Feb 2020 05:10) (94% - 100%)    02-11-20 @ 07:01  -  02-12-20 @ 07:00  --------------------------------------------------------  IN: 180 mL / OUT: 3000 mL / NET: -2820 mL    02-12-20 @ 07:01  -  02-12-20 @ 10:42  --------------------------------------------------------  IN: 150 mL / OUT: 0 mL / NET: 150 mL        Physical Exam  GENERAL: In no apparent distress, well nourished, and hydrated.  HEAD:  Atraumatic, Normocephalic  EYES: EOMI, PERRLA, conjunctiva and sclera clear  ENMT: Moist mucous membranes  NECK: Supple and normal thyroid.  No JVD  HEART: Regular rate and rhythm; No murmurs, rubs, or gallops.  PULMONARY: Clear to auscultation and perfusion.  No rales, wheezing, or rhonchi bilaterally.  ABDOMEN: Soft, Nontender, Nondistended; Bowel sounds present  EXTREMITIES:  2+ Peripheral Pulses, No clubbing, cyanosis, or edema  SKIN: Dressing over L chest incision, no hematoma  NEUROLOGICAL: Grossly nonfocal    LABS:                        10.3   10.95 )-----------( 380      ( 12 Feb 2020 06:22 )             33.0     02-12    134<L>  |  90<L>  |  12  ----------------------------<  166<H>  3.9   |  31  |  0.7    Ca    8.6      12 Feb 2020 06:22  Mg     1.7     02-12    TPro  5.7<L>  /  Alb  3.0<L>  /  TBili  2.2<H>  /  DBili  x   /  AST  18  /  ALT  11  /  AlkPhos  104  02-12    PT/INR - ( 11 Feb 2020 06:49 )   PT: 16.80 sec;   INR: 1.46 ratio         PTT - ( 11 Feb 2020 06:49 )  PTT:25.7 sec    TELE: Paced rhythm 70s    RADIOLOGY & ADDITIONAL TESTS:    12 Lead ECG (02.05.20 @ 16:30)    Ventricular Rate 75 BPM    Atrial Rate 75 BPM    P-R Interval 194 ms    QRS Duration 154 ms    Q-T Interval 396 ms    QTC Calculation(Bezet) 442 ms    P Axis 49 degrees    R Axis -48 degrees    T Axis 128 degrees    Diagnosis Line Sinus rhythm withoccasional Premature ventricular complexes  Left axis deviation  Left bundle branch block  Abnormal ECG    Confirmed by London Santos [id number]) on 2/6/2020 4:31:51 PM    < from: Xray Chest 1 View- PORTABLE-Urgent (02.11.20 @ 14:10) >  EXAM:  XR CHEST PORTABLE URGENT 1V            PROCEDURE DATE:  02/11/2020            INTERPRETATION:  Clinical History / Reason for exam: Status post pacemaker and cardiac or    Comparison : Chest radiograph 1/31/2020.    Technique/Positioning: Portable AP.    Findings:    Support devices: There is a new left-sided AICD, partially obscuring the chest, with atrial and ventricular leads and epicardial pacing wires.    Cardiac/mediastinum/hilum: Stable cardiomegaly.    Lung parenchyma/Pleura: Within normal limits.    Skeleton/soft tissues: Stable degenerative changes.    Impression:      No radiographic evidence of acute cardiopulmonary disease.    New AICD as above.                  LUÍS BENJAMIN M.D., ATTENDING RADIOLOGIST  This document has been electronically signed. Feb 11 2020  4:31PM

## 2020-02-12 NOTE — PROGRESS NOTE ADULT - ASSESSMENT
Assessment: 63 YO M with a PMH of CAD s/p CABG (LIMA to LAD), s/p PCI of RCA with instent thrombosis plavix resistance on Effient, ischemic CM/HFrEF (25%), DM (on insulin pump), pulmonary hypertension, Celiac disease, diverticulitis, s/p right total knee replacement October 2019 complicated by infection s/p abx course w/ PICC, presenting following a mechanical fall found to have right hip IT fracture. Pt s/p BiV ICD placement with Dr Fernandes x 1 day ago. Patient feeling well at this time.    Plan:  ICM EF 25% sp BiV ICD ([facility])  CAD sp CABG  P HTN  SP TRK Replacement complicated by infection  SP R Hip Fx  - CXR PA/lateral pending  - Interrogation completed, numbers within appropriate range  - Do not lift L arm above shoulder height or lift > 5 lbs x 1 month  - Follow up with Dr Fernandes as scheduled, keep dressing in place until FU  - Leave steri strips in place until FU with Dr Mejia  - Follow up with Dr Mejia in 3-4 weeks

## 2020-02-12 NOTE — PROGRESS NOTE ADULT - SUBJECTIVE AND OBJECTIVE BOX
LOIDA, JOHN  64y, Male  Allergy: ACE inhibitors (Hives)  enalapril (Hives)  rifampin (Angioedema)  vancomycin (Angioedema)      LOS  12d    CHIEF COMPLAINT: intertrochanteric fracture of right hip (12 Feb 2020 15:20)      INTERVAL EVENTS/HPI  - No acute events overnight  - T(F): , Max: 97.4 (02-12-20 @ 05:10)  - Denies any worsening symptoms  - Tolerating medication  - WBC Count: 10.95 (02-12-20 @ 06:22)  WBC Count: 9.84 (02-11-20 @ 06:49)  - Creatinine, Serum: 0.7 (02-12-20 @ 06:22)  Creatinine, Serum: 0.9 (02-11-20 @ 06:49)       ROS  General: Denies rigors, nightsweats  HEENT: Denies headache, rhinorrhea, sore throat, eye pain  CV: Denies CP, palpitations  PULM: Denies wheezing, hemoptysis  GI: Denies hematemesis, hematochezia, melena  : Denies discharge, hematuria  MSK: Denies arthralgias, myalgias  SKIN: Denies rash, lesions  NEURO: Denies paresthesias, weakness  PSYCH: Denies depression, anxiety    VITALS:  T(F): 97.3, Max: 97.4 (02-12-20 @ 05:10)  HR: 73  BP: 113/61  RR: 19Vital Signs Last 24 Hrs  T(C): 36.3 (12 Feb 2020 13:50), Max: 36.3 (12 Feb 2020 05:10)  T(F): 97.3 (12 Feb 2020 13:50), Max: 97.4 (12 Feb 2020 05:10)  HR: 73 (12 Feb 2020 13:50) (72 - 77)  BP: 113/61 (12 Feb 2020 13:50) (113/56 - 116/57)  BP(mean): --  RR: 19 (12 Feb 2020 13:50) (18 - 19)  SpO2: 94% (12 Feb 2020 05:10) (94% - 99%)    PHYSICAL EXAM:  Gen: NAD, resting in bed  HEENT: Normocephalic, atraumatic  Neck: supple, no lymphadenopathy  CV: Regular rate & regular rhythm, AICD clean  Lungs: decreased BS at bases, no fremitus  Abdomen: Soft, BS present  Ext: Warm, well perfused, LE dressings  Neuro: non focal, awake  Skin: no rash, no erythema  Lines: no phlebitis    FH: Non-contributory  Social Hx: Non-contributory    TESTS & MEASUREMENTS:                        10.3   10.95 )-----------( 380      ( 12 Feb 2020 06:22 )             33.0     02-12    134<L>  |  90<L>  |  12  ----------------------------<  166<H>  3.9   |  31  |  0.7    Ca    8.6      12 Feb 2020 06:22  Mg     1.7     02-12    TPro  5.7<L>  /  Alb  3.0<L>  /  TBili  2.2<H>  /  DBili  x   /  AST  18  /  ALT  11  /  AlkPhos  104  02-12    eGFR if Non African American: 101 mL/min/1.73M2 (02-12-20 @ 06:22)  eGFR if African American: 117 mL/min/1.73M2 (02-12-20 @ 06:22)    LIVER FUNCTIONS - ( 12 Feb 2020 06:22 )  Alb: 3.0 g/dL / Pro: 5.7 g/dL / ALK PHOS: 104 U/L / ALT: 11 U/L / AST: 18 U/L / GGT: x                     INFECTIOUS DISEASES TESTING  MRSA PCR Result.: Negative (10-14-19 @ 17:18)      RADIOLOGY & ADDITIONAL TESTS:  I have personally reviewed the last available Chest xray  CXR  Xray Chest 2 Views PA/Lat:   EXAM:  XR CHEST PA LAT 2V            PROCEDURE DATE:  02/12/2020            INTERPRETATION:  Clinical History / Reason for exam: Status post pacemaker and cardiac or    Comparison : Chest radiograph 2/11/2020    Technique/Positioning: PA and lateral    Findings:    Support devices: Again seen is left-sided biventricular AICD.    Cardiac/mediastinum/hilum: Stable cardiomegaly.    Lung parenchyma/Pleura: Within normal limits.    Skeleton/soft tissues: Stable degenerative changes. Chronic left-sided rib fractures.    Impression:    No radiographic evidence of acute cardiopulmonary disease. No pneumothorax    AICD as above.                          ROBERTA LEMPERT M.D., ATTENDING RADIOLOGIST  This document has been electronically signed. Feb 12 2020  2:36PM             (02-12-20 @ 13:31)      CT      CARDIOLOGY TESTING  12 Lead ECG:   Ventricular Rate 75 BPM    Atrial Rate 75 BPM    P-R Interval 194 ms    QRS Duration 154 ms    Q-T Interval 396 ms    QTC Calculation(Bezet) 442 ms    P Axis 49 degrees    R Axis -48 degrees    T Axis 128 degrees    Diagnosis Line Sinus rhythm withoccasional Premature ventricular complexes  Left axis deviation  Left bundle branch block  Abnormal ECG    Confirmed by London Santos ([id number]) on 2/6/2020 4:31:51 PM (02-05-20 @ 16:30)  12 Lead ECG:   Ventricular Rate 94 BPM    Atrial Rate 94 BPM    P-R Interval 192 ms    QRS Duration 148 ms    Q-T Interval 390 ms    QTC Calculation(Bezet) 487 ms    P Axis 55 degrees    R Axis -55 degrees    T Axis 112 degrees    Diagnosis Line Sinus rhythm withPremature atrial complexes with Aberrant conduction  Left axis deviation  Left bundle branch block  Abnormal ECG    Confirmed by Ze Beebe ([id number]) on 1/31/2020 5:50:22 AM (01-31-20 @ 02:39)      MEDICATIONS  acetaminophen   Tablet .. 650  ALPRAZolam 0.25  aspirin enteric coated 81  atorvastatin 40  ceFAZolin   IVPB 1000  clindamycin   Capsule 300  dextrose 5%. 1000  dextrose 50% Injectable 25  digoxin     Tablet 0.125  DULoxetine 90  furosemide    Tablet 40  insulin lispro (HumaLOG) corrective regimen sliding scale   insulin lispro Injectable (HumaLOG) 3  insulin lispro Injectable (HumaLOG) 3  insulin lispro Injectable (HumaLOG) 3  lactated ringers. 1000  magnesium oxide 400  metolazone 2.5  metoprolol succinate   pantoprazole    Tablet 40  sodium chloride 0.9%. 500  tamsulosin 0.4      WEIGHT  Weight (kg): 103.5 (02-11-20 @ 11:30)    ANTIBIOTICS:  ceFAZolin   IVPB 1000 milliGRAM(s) IV Intermittent every 8 hours  clindamycin   Capsule 300 milliGRAM(s) Oral every 8 hours      All available historical records have been reviewed

## 2020-02-12 NOTE — PROGRESS NOTE ADULT - SUBJECTIVE AND OBJECTIVE BOX
SUBJECTIVE:    Patient is a 64y old Male who presents with a chief complaint of intertrochanteric fracture of right hip (12 Feb 2020 15:54)    Overnight Events:  Patient was seen at bed side this morning.  patient denied chest pain , sob, n/v abdominal pain. patient complains of pain in rle.     PAST MEDICAL & SURGICAL HISTORY  Diabetic neuropathy  STEMI (ST elevation myocardial infarction)  Diverticulitis  MRSA bacteremia  History of celiac disease  CHF (congestive heart failure): EF ~ 25%  HTN (hypertension)  Diabetes: on insulin pump  Blood clot due to device, implant, or graft: was on blood thinners  HLD (hyperlipidemia)  Osteoarthritis  Atherosclerosis of coronary artery: CAD (coronary artery disease)  Status post percutaneous transluminal coronary angioplasty: in 2012  Surgery, elective: Right shoulder  Surgery, elective: right knee wound debridement  S/P TKR (total knee replacement), right: with infection Mrsa   per pt he was cleared from MRSA infection  S/P CABG x 1: 2018  Stented coronary artery: 10/18 heart attack  INFERIOR WALL MI  Other postprocedural status: Fixation hardware in foot LEFT    SOCIAL HISTORY:  Negative for smoking/alcohol/drug use.     ALLERGIES:  ACE inhibitors (Hives)  enalapril (Hives)  rifampin (Angioedema)  vancomycin (Angioedema)    MEDICATIONS:  STANDING MEDICATIONS  acetaminophen   Tablet .. 650 milliGRAM(s) Oral every 6 hours  ALPRAZolam 0.25 milliGRAM(s) Oral daily  aspirin enteric coated 81 milliGRAM(s) Oral daily  atorvastatin 40 milliGRAM(s) Oral at bedtime  ceFAZolin   IVPB 1000 milliGRAM(s) IV Intermittent every 8 hours  dextrose 5%. 1000 milliLiter(s) IV Continuous <Continuous>  dextrose 50% Injectable 25 Gram(s) IV Push once  digoxin     Tablet 0.125 milliGRAM(s) Oral daily  DULoxetine 90 milliGRAM(s) Oral daily  furosemide    Tablet 40 milliGRAM(s) Oral daily  insulin lispro (HumaLOG) corrective regimen sliding scale   SubCutaneous three times a day before meals  insulin lispro Injectable (HumaLOG) 3 Unit(s) SubCutaneous before breakfast  insulin lispro Injectable (HumaLOG) 3 Unit(s) SubCutaneous before lunch  insulin lispro Injectable (HumaLOG) 3 Unit(s) SubCutaneous before dinner  lactated ringers. 1000 milliLiter(s) IV Continuous <Continuous>  magnesium oxide 400 milliGRAM(s) Oral three times a day with meals  metolazone 2.5 milliGRAM(s) Oral daily  metoprolol succinate  milliGRAM(s) Oral daily  pantoprazole    Tablet 40 milliGRAM(s) Oral before breakfast  sodium chloride 0.9%. 500 milliLiter(s) IV Continuous <Continuous>  tamsulosin 0.4 milliGRAM(s) Oral at bedtime  trimethoprim  160 mG/sulfamethoxazole 800 mG 1 Tablet(s) Oral two times a day    PRN MEDICATIONS  dextrose 40% Gel 15 Gram(s) Oral once PRN  glucagon  Injectable 1 milliGRAM(s) IntraMuscular once PRN    VITALS:   T(F): 97.3  HR: 73  BP: 113/61  RR: 19  SpO2: 94%    02-11-20 @ 07:01  -  02-12-20 @ 07:00  --------------------------------------------------------  IN: 180 mL / OUT: 3000 mL / NET: -2820 mL    02-12-20 @ 07:01  - 02-12-20 @ 16:13  --------------------------------------------------------  IN: 500 mL / OUT: 901 mL / NET: -401 mL      PHYSICAL EXAM:  CONSTITUTIONAL: No acute distress, well-developed, well-groomed, AAOx3, Loja catheter in place  HEAD: Atraumatic, normocephalic  EYES: EOM intact, PERRLA, conjunctiva and sclera clear  ENT: Supple, no masses, no thyromegaly, no bruits, no JVD; moist mucous membranes  PULMONARY: Clear to auscultation bilaterally; no wheezes, rales, or rhonchi  CARDIOVASCULAR: Regular rate and rhythm; no murmurs, rubs, or gallops  GASTROINTESTINAL: Soft, non-tender, non-distended; bowel sounds present  MUSCULOSKELETAL: 2+ peripheral pulses; no clubbing, no cyanosis, no edema  NEUROLOGY: non-focal  SKIN: right anterior thigh wound wrapped in dressing, several surgical incisions along lateral right thigh with staples      LABS:                        10.3   10.95 )-----------( 380      ( 12 Feb 2020 06:22 )             33.0     02-12    134<L>  |  90<L>  |  12  ----------------------------<  166<H>  3.9   |  31  |  0.7    Ca    8.6      12 Feb 2020 06:22  Mg     1.7     02-12    TPro  5.7<L>  /  Alb  3.0<L>  /  TBili  2.2<H>  /  DBili  x   /  AST  18  /  ALT  11  /  AlkPhos  104  02-12    PT/INR - ( 11 Feb 2020 06:49 )   PT: 16.80 sec;   INR: 1.46 ratio         PTT - ( 11 Feb 2020 06:49 )  PTT:25.7 sec                  02-11-20 @ 07:01  -  02-12-20 @ 07:00  --------------------------------------------------------  IN: 180 mL / OUT: 3000 mL / NET: -2820 mL    02-12-20 @ 07:01  -  02-12-20 @ 16:13  --------------------------------------------------------  IN: 500 mL / OUT: 901 mL / NET: -401 mL          Radiology:

## 2020-02-12 NOTE — PROGRESS NOTE ADULT - SUBJECTIVE AND OBJECTIVE BOX
FLOWER MARISCAL  64y Male    CHIEF COMPLAINT:    Patient is a 64y old  Male who presents with a chief complaint of intertrochanteric fracture of right hip (2020 10:41)      INTERVAL HPI/OVERNIGHT EVENTS:    Patient seen and examined. S/P AICD yesterday. Sitting in a chair. Feels good. Failed voiding trial. No sob.     ROS: All other systems are negative.    Vital Signs:    T(F): 97.4 (20 @ 05:10), Max: 98 (20 @ 14:07)  HR: 77 (20 @ 05:10) (65 - 77)  BP: 116/57 (20 @ 05:10) (106/61 - 146/64)  RR: 18 (20 @ 20:10) (11 - 19)  SpO2: 94% (20 @ 05:10) (94% - 100%)  I&O's Summary    2020 07:  -  2020 07:00  --------------------------------------------------------  IN: 180 mL / OUT: 3000 mL / NET: -2820 mL    2020 07:01  -  2020 12:01  --------------------------------------------------------  IN: 150 mL / OUT: 0 mL / NET: 150 mL      Daily     Daily Weight in k.2 (2020 06:15)  CAPILLARY BLOOD GLUCOSE      POCT Blood Glucose.: 201 mg/dL (2020 11:36)  POCT Blood Glucose.: 154 mg/dL (2020 07:45)  POCT Blood Glucose.: 193 mg/dL (2020 21:25)  POCT Blood Glucose.: 163 mg/dL (2020 16:52)      PHYSICAL EXAM:    GENERAL:  NAD  SKIN: No rashes or lesions  HENT: Atraumatic Normocephalic. PERRL. Moist membranes.  NECK: Supple, No JVD. No lymphadenopathy.  PULMONARY: CTA B/L. No wheezing. No rales  CVS: Normal S1, S2. Rate and Rhythm are regular. No murmurs.  ABDOMEN/GI: Soft, Nontender, Nondistended; BS present  EXTREMITIES: Peripheral pulses intact. No edema B/L LE. Dressing over Rt knee.   NEUROLOGIC:  No motor or sensory deficit.  PSYCH: Alert & oriented x 3    Consultant(s) Notes Reviewed:  [x ] YES  [ ] NO  Care Discussed with Consultants/Other Providers [ x] YES  [ ] NO    EKG reviewed  Telemetry reviewed    LABS:                        10.3   10.95 )-----------( 380      ( 2020 06:22 )             33.0     02-12    134<L>  |  90<L>  |  12  ----------------------------<  166<H>  3.9   |  31  |  0.7    Ca    8.6      2020 06:22  Mg     1.7     02-12    TPro  5.7<L>  /  Alb  3.0<L>  /  TBili  2.2<H>  /  DBili  x   /  AST  18  /  ALT  11  /  AlkPhos  104  02-12    PT/INR - ( 2020 06:49 )   PT: 16.80 sec;   INR: 1.46 ratio         PTT - ( 2020 06:49 )  PTT:25.7 sec          RADIOLOGY & ADDITIONAL TESTS:      Imaging or report Personally Reviewed:  [ ] YES  [ ] NO    Medications:  Standing  acetaminophen   Tablet .. 650 milliGRAM(s) Oral every 6 hours  ALPRAZolam 0.25 milliGRAM(s) Oral daily  aspirin enteric coated 81 milliGRAM(s) Oral daily  atorvastatin 40 milliGRAM(s) Oral at bedtime  ceFAZolin   IVPB 1000 milliGRAM(s) IV Intermittent every 8 hours  clindamycin   Capsule 300 milliGRAM(s) Oral every 8 hours  dextrose 5%. 1000 milliLiter(s) IV Continuous <Continuous>  dextrose 50% Injectable 25 Gram(s) IV Push once  digoxin     Tablet 0.125 milliGRAM(s) Oral daily  DULoxetine 90 milliGRAM(s) Oral daily  insulin lispro (HumaLOG) corrective regimen sliding scale   SubCutaneous three times a day before meals  insulin lispro Injectable (HumaLOG) 3 Unit(s) SubCutaneous before breakfast  insulin lispro Injectable (HumaLOG) 3 Unit(s) SubCutaneous before lunch  insulin lispro Injectable (HumaLOG) 3 Unit(s) SubCutaneous before dinner  lactated ringers. 1000 milliLiter(s) IV Continuous <Continuous>  magnesium oxide 400 milliGRAM(s) Oral three times a day with meals  metolazone 2.5 milliGRAM(s) Oral daily  metoprolol succinate  milliGRAM(s) Oral daily  pantoprazole    Tablet 40 milliGRAM(s) Oral before breakfast  sodium chloride 0.9%. 500 milliLiter(s) IV Continuous <Continuous>  tamsulosin 0.4 milliGRAM(s) Oral at bedtime    PRN Meds  dextrose 40% Gel 15 Gram(s) Oral once PRN  glucagon  Injectable 1 milliGRAM(s) IntraMuscular once PRN      Case discussed with resident    Care discussed with pt/family
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
## 2020-02-13 LAB
ALBUMIN SERPL ELPH-MCNC: 2.9 G/DL — LOW (ref 3.5–5.2)
ALP SERPL-CCNC: 112 U/L — SIGNIFICANT CHANGE UP (ref 30–115)
ALT FLD-CCNC: 10 U/L — SIGNIFICANT CHANGE UP (ref 0–41)
ANION GAP SERPL CALC-SCNC: 14 MMOL/L — SIGNIFICANT CHANGE UP (ref 7–14)
AST SERPL-CCNC: 19 U/L — SIGNIFICANT CHANGE UP (ref 0–41)
BASOPHILS # BLD AUTO: 0.03 K/UL — SIGNIFICANT CHANGE UP (ref 0–0.2)
BASOPHILS NFR BLD AUTO: 0.3 % — SIGNIFICANT CHANGE UP (ref 0–1)
BILIRUB SERPL-MCNC: 2.1 MG/DL — HIGH (ref 0.2–1.2)
BUN SERPL-MCNC: 10 MG/DL — SIGNIFICANT CHANGE UP (ref 10–20)
CALCIUM SERPL-MCNC: 8.6 MG/DL — SIGNIFICANT CHANGE UP (ref 8.5–10.1)
CHLORIDE SERPL-SCNC: 86 MMOL/L — LOW (ref 98–110)
CO2 SERPL-SCNC: 33 MMOL/L — HIGH (ref 17–32)
CREAT SERPL-MCNC: 0.8 MG/DL — SIGNIFICANT CHANGE UP (ref 0.7–1.5)
EOSINOPHIL # BLD AUTO: 0.29 K/UL — SIGNIFICANT CHANGE UP (ref 0–0.7)
EOSINOPHIL NFR BLD AUTO: 2.7 % — SIGNIFICANT CHANGE UP (ref 0–8)
GLUCOSE BLDC GLUCOMTR-MCNC: 197 MG/DL — HIGH (ref 70–99)
GLUCOSE BLDC GLUCOMTR-MCNC: 217 MG/DL — HIGH (ref 70–99)
GLUCOSE BLDC GLUCOMTR-MCNC: 225 MG/DL — HIGH (ref 70–99)
GLUCOSE BLDC GLUCOMTR-MCNC: 237 MG/DL — HIGH (ref 70–99)
GLUCOSE SERPL-MCNC: 188 MG/DL — HIGH (ref 70–99)
HCT VFR BLD CALC: 33.5 % — LOW (ref 42–52)
HGB BLD-MCNC: 10.5 G/DL — LOW (ref 14–18)
IMM GRANULOCYTES NFR BLD AUTO: 0.3 % — SIGNIFICANT CHANGE UP (ref 0.1–0.3)
LYMPHOCYTES # BLD AUTO: 0.99 K/UL — LOW (ref 1.2–3.4)
LYMPHOCYTES # BLD AUTO: 9.3 % — LOW (ref 20.5–51.1)
MAGNESIUM SERPL-MCNC: 1.5 MG/DL — LOW (ref 1.8–2.4)
MCHC RBC-ENTMCNC: 22.1 PG — LOW (ref 27–31)
MCHC RBC-ENTMCNC: 31.3 G/DL — LOW (ref 32–37)
MCV RBC AUTO: 70.4 FL — LOW (ref 80–94)
MONOCYTES # BLD AUTO: 1.64 K/UL — HIGH (ref 0.1–0.6)
MONOCYTES NFR BLD AUTO: 15.4 % — HIGH (ref 1.7–9.3)
NEUTROPHILS # BLD AUTO: 7.66 K/UL — HIGH (ref 1.4–6.5)
NEUTROPHILS NFR BLD AUTO: 72 % — SIGNIFICANT CHANGE UP (ref 42.2–75.2)
NRBC # BLD: 0 /100 WBCS — SIGNIFICANT CHANGE UP (ref 0–0)
PLATELET # BLD AUTO: 337 K/UL — SIGNIFICANT CHANGE UP (ref 130–400)
POTASSIUM SERPL-MCNC: 3.3 MMOL/L — LOW (ref 3.5–5)
POTASSIUM SERPL-SCNC: 3.3 MMOL/L — LOW (ref 3.5–5)
PROT SERPL-MCNC: 5.7 G/DL — LOW (ref 6–8)
RBC # BLD: 4.76 M/UL — SIGNIFICANT CHANGE UP (ref 4.7–6.1)
RBC # FLD: 21.5 % — HIGH (ref 11.5–14.5)
SODIUM SERPL-SCNC: 133 MMOL/L — LOW (ref 135–146)
WBC # BLD: 10.64 K/UL — SIGNIFICANT CHANGE UP (ref 4.8–10.8)
WBC # FLD AUTO: 10.64 K/UL — SIGNIFICANT CHANGE UP (ref 4.8–10.8)

## 2020-02-13 PROCEDURE — 99232 SBSQ HOSP IP/OBS MODERATE 35: CPT

## 2020-02-13 PROCEDURE — 93306 TTE W/DOPPLER COMPLETE: CPT | Mod: 26

## 2020-02-13 RX ORDER — ACETAMINOPHEN 500 MG
2 TABLET ORAL
Qty: 0 | Refills: 0 | DISCHARGE
Start: 2020-02-13

## 2020-02-13 RX ORDER — MAGNESIUM SULFATE 500 MG/ML
2 VIAL (ML) INJECTION ONCE
Refills: 0 | Status: COMPLETED | OUTPATIENT
Start: 2020-02-13 | End: 2020-02-13

## 2020-02-13 RX ORDER — POTASSIUM CHLORIDE 20 MEQ
40 PACKET (EA) ORAL ONCE
Refills: 0 | Status: COMPLETED | OUTPATIENT
Start: 2020-02-13 | End: 2020-02-13

## 2020-02-13 RX ORDER — CALCIUM CARBONATE 500(1250)
1 TABLET ORAL ONCE
Refills: 0 | Status: COMPLETED | OUTPATIENT
Start: 2020-02-13 | End: 2020-02-13

## 2020-02-13 RX ORDER — TAMSULOSIN HYDROCHLORIDE 0.4 MG/1
1 CAPSULE ORAL
Qty: 0 | Refills: 0 | DISCHARGE
Start: 2020-02-13

## 2020-02-13 RX ADMIN — Medication 150 MILLIGRAM(S): at 05:43

## 2020-02-13 RX ADMIN — ATORVASTATIN CALCIUM 40 MILLIGRAM(S): 80 TABLET, FILM COATED ORAL at 21:41

## 2020-02-13 RX ADMIN — Medication 650 MILLIGRAM(S): at 05:43

## 2020-02-13 RX ADMIN — Medication 3 UNIT(S): at 12:06

## 2020-02-13 RX ADMIN — Medication 650 MILLIGRAM(S): at 18:58

## 2020-02-13 RX ADMIN — Medication 81 MILLIGRAM(S): at 12:15

## 2020-02-13 RX ADMIN — DULOXETINE HYDROCHLORIDE 90 MILLIGRAM(S): 30 CAPSULE, DELAYED RELEASE ORAL at 12:09

## 2020-02-13 RX ADMIN — Medication 1 TABLET(S): at 17:16

## 2020-02-13 RX ADMIN — Medication 3 UNIT(S): at 17:13

## 2020-02-13 RX ADMIN — Medication 0.12 MILLIGRAM(S): at 05:43

## 2020-02-13 RX ADMIN — Medication 4: at 12:06

## 2020-02-13 RX ADMIN — PANTOPRAZOLE SODIUM 40 MILLIGRAM(S): 20 TABLET, DELAYED RELEASE ORAL at 05:43

## 2020-02-13 RX ADMIN — TAMSULOSIN HYDROCHLORIDE 0.4 MILLIGRAM(S): 0.4 CAPSULE ORAL at 21:41

## 2020-02-13 RX ADMIN — Medication 50 GRAM(S): at 09:01

## 2020-02-13 RX ADMIN — Medication 1 TABLET(S): at 05:43

## 2020-02-13 RX ADMIN — Medication 0.25 MILLIGRAM(S): at 12:14

## 2020-02-13 RX ADMIN — MAGNESIUM OXIDE 400 MG ORAL TABLET 400 MILLIGRAM(S): 241.3 TABLET ORAL at 12:08

## 2020-02-13 RX ADMIN — Medication 650 MILLIGRAM(S): at 12:15

## 2020-02-13 RX ADMIN — Medication 650 MILLIGRAM(S): at 17:17

## 2020-02-13 RX ADMIN — Medication 40 MILLIGRAM(S): at 05:43

## 2020-02-13 RX ADMIN — Medication 40 MILLIEQUIVALENT(S): at 09:00

## 2020-02-13 RX ADMIN — Medication 4: at 17:13

## 2020-02-13 RX ADMIN — Medication 650 MILLIGRAM(S): at 18:55

## 2020-02-13 RX ADMIN — Medication 3 UNIT(S): at 08:14

## 2020-02-13 RX ADMIN — Medication 1 TABLET(S): at 17:36

## 2020-02-13 RX ADMIN — Medication 650 MILLIGRAM(S): at 06:40

## 2020-02-13 RX ADMIN — MAGNESIUM OXIDE 400 MG ORAL TABLET 400 MILLIGRAM(S): 241.3 TABLET ORAL at 08:16

## 2020-02-13 RX ADMIN — Medication 2: at 08:15

## 2020-02-13 RX ADMIN — MAGNESIUM OXIDE 400 MG ORAL TABLET 400 MILLIGRAM(S): 241.3 TABLET ORAL at 17:15
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
## 2020-02-13 NOTE — PROGRESS NOTE ADULT - SUBJECTIVE AND OBJECTIVE BOX
SUBJECTIVE:    Patient is a 64y old Male who presents with a chief complaint of intertrochanteric fracture of right hip (12 Feb 2020 16:13)    Overnight Events:  Patient was seen at bed side this morning.  patient denied chest pain , sob, n/v abdominal pain. patient says pain is well controlled.   PAST MEDICAL & SURGICAL HISTORY  Diabetic neuropathy  STEMI (ST elevation myocardial infarction)  Diverticulitis  MRSA bacteremia  History of celiac disease  CHF (congestive heart failure): EF ~ 25%  HTN (hypertension)  Diabetes: on insulin pump  Blood clot due to device, implant, or graft: was on blood thinners  HLD (hyperlipidemia)  Osteoarthritis  Atherosclerosis of coronary artery: CAD (coronary artery disease)  Status post percutaneous transluminal coronary angioplasty: in 2012  Surgery, elective: Right shoulder  Surgery, elective: right knee wound debridement  S/P TKR (total knee replacement), right: with infection Mrsa   per pt he was cleared from MRSA infection  S/P CABG x 1: 2018  Stented coronary artery: 10/18 heart attack  INFERIOR WALL MI  Other postprocedural status: Fixation hardware in foot LEFT    SOCIAL HISTORY:  Negative for smoking/alcohol/drug use.     ALLERGIES:  ACE inhibitors (Hives)  enalapril (Hives)  rifampin (Angioedema)  vancomycin (Angioedema)    MEDICATIONS:  STANDING MEDICATIONS  acetaminophen   Tablet .. 650 milliGRAM(s) Oral every 6 hours  ALPRAZolam 0.25 milliGRAM(s) Oral daily  aspirin enteric coated 81 milliGRAM(s) Oral daily  atorvastatin 40 milliGRAM(s) Oral at bedtime  ceFAZolin   IVPB 1000 milliGRAM(s) IV Intermittent every 8 hours  dextrose 5%. 1000 milliLiter(s) IV Continuous <Continuous>  dextrose 50% Injectable 25 Gram(s) IV Push once  digoxin     Tablet 0.125 milliGRAM(s) Oral daily  DULoxetine 90 milliGRAM(s) Oral daily  furosemide    Tablet 40 milliGRAM(s) Oral daily  insulin lispro (HumaLOG) corrective regimen sliding scale   SubCutaneous three times a day before meals  insulin lispro Injectable (HumaLOG) 3 Unit(s) SubCutaneous before breakfast  insulin lispro Injectable (HumaLOG) 3 Unit(s) SubCutaneous before lunch  insulin lispro Injectable (HumaLOG) 3 Unit(s) SubCutaneous before dinner  lactated ringers. 1000 milliLiter(s) IV Continuous <Continuous>  magnesium oxide 400 milliGRAM(s) Oral three times a day with meals  metolazone 2.5 milliGRAM(s) Oral daily  metoprolol succinate  milliGRAM(s) Oral daily  pantoprazole    Tablet 40 milliGRAM(s) Oral before breakfast  sodium chloride 0.9%. 500 milliLiter(s) IV Continuous <Continuous>  tamsulosin 0.4 milliGRAM(s) Oral at bedtime  trimethoprim  160 mG/sulfamethoxazole 800 mG 1 Tablet(s) Oral two times a day    PRN MEDICATIONS  dextrose 40% Gel 15 Gram(s) Oral once PRN  glucagon  Injectable 1 milliGRAM(s) IntraMuscular once PRN    VITALS:   T(F): 97.2  HR: 76  BP: 100/58  RR: 18  SpO2: --    02-12-20 @ 07:01  - 02-13-20 @ 07:00  --------------------------------------------------------  IN: 500 mL / OUT: 4751 mL / NET: -4251 mL    02-13-20 @ 07:01 - 02-13-20 @ 10:37  --------------------------------------------------------  IN: 350 mL / OUT: 0 mL / NET: 350 mL      PHYSICAL EXAM:  CONSTITUTIONAL: No acute distress, well-developed, well-groomed, AAOx3, Loja catheter in place  HEAD: Atraumatic, normocephalic  EYES: EOM intact, PERRLA, conjunctiva and sclera clear  ENT: Supple, no masses, no thyromegaly, no bruits, no JVD; moist mucous membranes  PULMONARY: Clear to auscultation bilaterally; no wheezes, rales, or rhonchi  CARDIOVASCULAR: Regular rate and rhythm; no murmurs, rubs, or gallops  GASTROINTESTINAL: Soft, non-tender, non-distended; bowel sounds present  MUSCULOSKELETAL: 2+ peripheral pulses; no clubbing, no cyanosis, no edema  NEUROLOGY: non-focal  SKIN: right anterior thigh wound wrapped in dressing, several surgical incisions along lateral right thigh with staples      LABS:                        10.5   10.64 )-----------( 337      ( 13 Feb 2020 05:26 )             33.5     02-13    133<L>  |  86<L>  |  10  ----------------------------<  188<H>  3.3<L>   |  33<H>  |  0.8    Ca    8.6      13 Feb 2020 05:26  Mg     1.5     02-13    TPro  5.7<L>  /  Alb  2.9<L>  /  TBili  2.1<H>  /  DBili  x   /  AST  19  /  ALT  10  /  AlkPhos  112  02-13 02-12-20 @ 07:01  - 02-13-20 @ 07:00  --------------------------------------------------------  IN: 500 mL / OUT: 4751 mL / NET: -4251 mL    02-13-20 @ 07:01  -  02-13-20 @ 10:37  --------------------------------------------------------  IN: 350 mL / OUT: 0 mL / NET: 350 mL          Radiology:

## 2020-02-13 NOTE — PROGRESS NOTE ADULT - SUBJECTIVE AND OBJECTIVE BOX
FLOWER MARISCAL  64y Male    CHIEF COMPLAINT:    Patient is a 64y old  Male who presents with a chief complaint of intertrochanteric fracture of right hip (2020 10:36)      INTERVAL HPI/OVERNIGHT EVENTS:    Patient seen and examined. Sitting comfortably in a chair. No sob. No pain. No new complaint    ROS: All other systems are negative.    Vital Signs:    T(F): 97.2 (20 @ 05:21), Max: 97.4 (20 @ 20:50)  HR: 76 (20 @ 05:21) (67 - 76)  BP: 100/58 (20 @ 05:21) (100/58 - 113/61)  RR: 18 (20 @ 05:21) (18 - 19)  SpO2: --  I&O's Summary    2020 07:  -  2020 07:00  --------------------------------------------------------  IN: 500 mL / OUT: 4751 mL / NET: -4251 mL    2020 07:01  -  2020 10:52  --------------------------------------------------------  IN: 350 mL / OUT: 0 mL / NET: 350 mL      Daily     Daily Weight in k.4 (2020 05:21)  CAPILLARY BLOOD GLUCOSE      POCT Blood Glucose.: 197 mg/dL (2020 07:32)  POCT Blood Glucose.: 218 mg/dL (2020 22:16)  POCT Blood Glucose.: 322 mg/dL (2020 20:57)  POCT Blood Glucose.: 253 mg/dL (2020 16:50)  POCT Blood Glucose.: 201 mg/dL (2020 11:36)      PHYSICAL EXAM:    GENERAL:  NAD  SKIN: No rashes or lesions  HENT: Atraumatic Normocephalic. PERRL. Moist membranes.  NECK: Supple, No JVD. No lymphadenopathy.  PULMONARY: CTA B/L. No wheezing. No rales  CVS: Normal S1, S2. Rate and Rhythm are regular. No murmurs.  ABDOMEN/GI: Soft, Nontender, Nondistended; BS present  EXTREMITIES: Peripheral pulses intact. No edema B/L LE. Dressing over Rt knee.  NEUROLOGIC:  No motor or sensory deficit.  PSYCH: Alert & oriented x 3    Consultant(s) Notes Reviewed:  [x ] YES  [ ] NO  Care Discussed with Consultants/Other Providers [ x] YES  [ ] NO    EKG reviewed  Telemetry reviewed    LABS:                        10.5   10.64 )-----------( 337      ( 2020 05:26 )             33.5     02-13    133<L>  |  86<L>  |  10  ----------------------------<  188<H>  3.3<L>   |  33<H>  |  0.8    Ca    8.6      2020 05:26  Mg     1.5     02-13    TPro  5.7<L>  /  Alb  2.9<L>  /  TBili  2.1<H>  /  DBili  x   /  AST  19  /  ALT  10  /  AlkPhos  112  02-              RADIOLOGY & ADDITIONAL TESTS:      Imaging or report Personally Reviewed:  [ ] YES  [ ] NO    Medications:  Standing  acetaminophen   Tablet .. 650 milliGRAM(s) Oral every 6 hours  ALPRAZolam 0.25 milliGRAM(s) Oral daily  aspirin enteric coated 81 milliGRAM(s) Oral daily  atorvastatin 40 milliGRAM(s) Oral at bedtime  ceFAZolin   IVPB 1000 milliGRAM(s) IV Intermittent every 8 hours  dextrose 5%. 1000 milliLiter(s) IV Continuous <Continuous>  dextrose 50% Injectable 25 Gram(s) IV Push once  digoxin     Tablet 0.125 milliGRAM(s) Oral daily  DULoxetine 90 milliGRAM(s) Oral daily  furosemide    Tablet 40 milliGRAM(s) Oral daily  insulin lispro (HumaLOG) corrective regimen sliding scale   SubCutaneous three times a day before meals  insulin lispro Injectable (HumaLOG) 3 Unit(s) SubCutaneous before breakfast  insulin lispro Injectable (HumaLOG) 3 Unit(s) SubCutaneous before lunch  insulin lispro Injectable (HumaLOG) 3 Unit(s) SubCutaneous before dinner  lactated ringers. 1000 milliLiter(s) IV Continuous <Continuous>  magnesium oxide 400 milliGRAM(s) Oral three times a day with meals  metolazone 2.5 milliGRAM(s) Oral daily  metoprolol succinate  milliGRAM(s) Oral daily  pantoprazole    Tablet 40 milliGRAM(s) Oral before breakfast  sodium chloride 0.9%. 500 milliLiter(s) IV Continuous <Continuous>  tamsulosin 0.4 milliGRAM(s) Oral at bedtime  trimethoprim  160 mG/sulfamethoxazole 800 mG 1 Tablet(s) Oral two times a day    PRN Meds  dextrose 40% Gel 15 Gram(s) Oral once PRN  glucagon  Injectable 1 milliGRAM(s) IntraMuscular once PRN      Case discussed with resident    Care discussed with pt/family

## 2020-02-14 ENCOUNTER — TRANSCRIPTION ENCOUNTER (OUTPATIENT)
Age: 65
End: 2020-02-14

## 2020-02-14 VITALS
TEMPERATURE: 96 F | HEART RATE: 74 BPM | OXYGEN SATURATION: 97 % | SYSTOLIC BLOOD PRESSURE: 98 MMHG | DIASTOLIC BLOOD PRESSURE: 60 MMHG | RESPIRATION RATE: 18 BRPM

## 2020-02-14 LAB
ALBUMIN SERPL ELPH-MCNC: 3.3 G/DL — LOW (ref 3.5–5.2)
ALP SERPL-CCNC: 131 U/L — HIGH (ref 30–115)
ALT FLD-CCNC: 11 U/L — SIGNIFICANT CHANGE UP (ref 0–41)
ANION GAP SERPL CALC-SCNC: 14 MMOL/L — SIGNIFICANT CHANGE UP (ref 7–14)
AST SERPL-CCNC: 20 U/L — SIGNIFICANT CHANGE UP (ref 0–41)
BASOPHILS # BLD AUTO: 0.06 K/UL — SIGNIFICANT CHANGE UP (ref 0–0.2)
BASOPHILS NFR BLD AUTO: 0.5 % — SIGNIFICANT CHANGE UP (ref 0–1)
BILIRUB SERPL-MCNC: 2.2 MG/DL — HIGH (ref 0.2–1.2)
BUN SERPL-MCNC: 12 MG/DL — SIGNIFICANT CHANGE UP (ref 10–20)
CALCIUM SERPL-MCNC: 9.1 MG/DL — SIGNIFICANT CHANGE UP (ref 8.5–10.1)
CHLORIDE SERPL-SCNC: 84 MMOL/L — LOW (ref 98–110)
CO2 SERPL-SCNC: 35 MMOL/L — HIGH (ref 17–32)
CREAT SERPL-MCNC: 1 MG/DL — SIGNIFICANT CHANGE UP (ref 0.7–1.5)
EOSINOPHIL # BLD AUTO: 0.24 K/UL — SIGNIFICANT CHANGE UP (ref 0–0.7)
EOSINOPHIL NFR BLD AUTO: 1.9 % — SIGNIFICANT CHANGE UP (ref 0–8)
GLUCOSE BLDC GLUCOMTR-MCNC: 178 MG/DL — HIGH (ref 70–99)
GLUCOSE BLDC GLUCOMTR-MCNC: 195 MG/DL — HIGH (ref 70–99)
GLUCOSE BLDC GLUCOMTR-MCNC: 240 MG/DL — HIGH (ref 70–99)
GLUCOSE SERPL-MCNC: 211 MG/DL — HIGH (ref 70–99)
HCT VFR BLD CALC: 36.2 % — LOW (ref 42–52)
HGB BLD-MCNC: 11.4 G/DL — LOW (ref 14–18)
IMM GRANULOCYTES NFR BLD AUTO: 0.5 % — HIGH (ref 0.1–0.3)
LYMPHOCYTES # BLD AUTO: 0.86 K/UL — LOW (ref 1.2–3.4)
LYMPHOCYTES # BLD AUTO: 6.7 % — LOW (ref 20.5–51.1)
MAGNESIUM SERPL-MCNC: 1.8 MG/DL — SIGNIFICANT CHANGE UP (ref 1.8–2.4)
MCHC RBC-ENTMCNC: 22.4 PG — LOW (ref 27–31)
MCHC RBC-ENTMCNC: 31.5 G/DL — LOW (ref 32–37)
MCV RBC AUTO: 71 FL — LOW (ref 80–94)
MONOCYTES # BLD AUTO: 1.72 K/UL — HIGH (ref 0.1–0.6)
MONOCYTES NFR BLD AUTO: 13.5 % — HIGH (ref 1.7–9.3)
NEUTROPHILS # BLD AUTO: 9.83 K/UL — HIGH (ref 1.4–6.5)
NEUTROPHILS NFR BLD AUTO: 76.9 % — HIGH (ref 42.2–75.2)
NRBC # BLD: 0 /100 WBCS — SIGNIFICANT CHANGE UP (ref 0–0)
PLATELET # BLD AUTO: 346 K/UL — SIGNIFICANT CHANGE UP (ref 130–400)
POTASSIUM SERPL-MCNC: 3.8 MMOL/L — SIGNIFICANT CHANGE UP (ref 3.5–5)
POTASSIUM SERPL-SCNC: 3.8 MMOL/L — SIGNIFICANT CHANGE UP (ref 3.5–5)
PROT SERPL-MCNC: 6.3 G/DL — SIGNIFICANT CHANGE UP (ref 6–8)
RBC # BLD: 5.1 M/UL — SIGNIFICANT CHANGE UP (ref 4.7–6.1)
RBC # FLD: 22.2 % — HIGH (ref 11.5–14.5)
SODIUM SERPL-SCNC: 133 MMOL/L — LOW (ref 135–146)
WBC # BLD: 12.77 K/UL — HIGH (ref 4.8–10.8)
WBC # FLD AUTO: 12.77 K/UL — HIGH (ref 4.8–10.8)

## 2020-02-14 PROCEDURE — 99239 HOSP IP/OBS DSCHRG MGMT >30: CPT

## 2020-02-14 PROCEDURE — 93010 ELECTROCARDIOGRAM REPORT: CPT

## 2020-02-14 PROCEDURE — 99238 HOSP IP/OBS DSCHRG MGMT 30/<: CPT

## 2020-02-14 RX ORDER — FAMOTIDINE 10 MG/ML
40 INJECTION INTRAVENOUS ONCE
Refills: 0 | Status: COMPLETED | OUTPATIENT
Start: 2020-02-14 | End: 2020-02-14

## 2020-02-14 RX ADMIN — Medication 1 TABLET(S): at 05:33

## 2020-02-14 RX ADMIN — Medication 0.12 MILLIGRAM(S): at 05:33

## 2020-02-14 RX ADMIN — MAGNESIUM OXIDE 400 MG ORAL TABLET 400 MILLIGRAM(S): 241.3 TABLET ORAL at 08:24

## 2020-02-14 RX ADMIN — Medication 650 MILLIGRAM(S): at 17:44

## 2020-02-14 RX ADMIN — Medication 81 MILLIGRAM(S): at 12:32

## 2020-02-14 RX ADMIN — MAGNESIUM OXIDE 400 MG ORAL TABLET 400 MILLIGRAM(S): 241.3 TABLET ORAL at 12:32

## 2020-02-14 RX ADMIN — DULOXETINE HYDROCHLORIDE 90 MILLIGRAM(S): 30 CAPSULE, DELAYED RELEASE ORAL at 12:32

## 2020-02-14 RX ADMIN — Medication 40 MILLIGRAM(S): at 05:33

## 2020-02-14 RX ADMIN — Medication 4: at 12:29

## 2020-02-14 RX ADMIN — Medication 3 UNIT(S): at 08:21

## 2020-02-14 RX ADMIN — Medication 3 UNIT(S): at 17:43

## 2020-02-14 RX ADMIN — Medication 3 UNIT(S): at 12:30

## 2020-02-14 RX ADMIN — Medication 150 MILLIGRAM(S): at 05:33

## 2020-02-14 RX ADMIN — Medication 0.25 MILLIGRAM(S): at 12:35

## 2020-02-14 RX ADMIN — Medication 2: at 08:21

## 2020-02-14 RX ADMIN — PANTOPRAZOLE SODIUM 40 MILLIGRAM(S): 20 TABLET, DELAYED RELEASE ORAL at 08:22

## 2020-02-14 RX ADMIN — FAMOTIDINE 40 MILLIGRAM(S): 10 INJECTION INTRAVENOUS at 12:32

## 2020-02-14 RX ADMIN — Medication 650 MILLIGRAM(S): at 12:31

## 2020-02-14 RX ADMIN — Medication 650 MILLIGRAM(S): at 05:33

## 2020-02-14 RX ADMIN — Medication 1 TABLET(S): at 17:43

## 2020-02-14 RX ADMIN — MAGNESIUM OXIDE 400 MG ORAL TABLET 400 MILLIGRAM(S): 241.3 TABLET ORAL at 17:43

## 2020-02-14 RX ADMIN — Medication 650 MILLIGRAM(S): at 13:00

## 2020-02-14 NOTE — PROGRESS NOTE ADULT - SUBJECTIVE AND OBJECTIVE BOX
FLOWER MARISCAL  64y Male    CHIEF COMPLAINT:    Patient is a 64y old  Male who presents with a chief complaint of intertrochanteric fracture of right hip (2020 10:52)      INTERVAL HPI/OVERNIGHT EVENTS:    Patient seen and examined. No Palpitations. No sob.     ROS: All other systems are negative.    Vital Signs:    T(F): 97.1 (20 @ 05:02), Max: 97.8 (20 @ 12:27)  HR: 75 (20 @ 05:02) (66 - 76)  BP: 112/55 (20 @ 05:02) (102/51 - 112/55)  RR: 18 (20 @ 05:02) (18 - 18)  SpO2: --  I&O's Summary    2020 07:  -  2020 07:00  --------------------------------------------------------  IN: 800 mL / OUT: 3400 mL / NET: -2600 mL    2020 07:  -  2020 11:58  --------------------------------------------------------  IN: 440 mL / OUT: 0 mL / NET: 440 mL      Daily     Daily Weight in k.3 (2020 05:02)  CAPILLARY BLOOD GLUCOSE      POCT Blood Glucose.: 195 mg/dL (2020 07:56)  POCT Blood Glucose.: 217 mg/dL (2020 22:04)  POCT Blood Glucose.: 237 mg/dL (2020 16:38)      PHYSICAL EXAM:    GENERAL:  NAD  SKIN: No rashes or lesions  HENT: Atraumatic Normocephalic. PERRL. Moist membranes.  NECK: Supple, No JVD. No lymphadenopathy.  PULMONARY: CTA B/L. No wheezing. No rales  CVS: Normal S1, S2. Rate and Rhythm are regular. No murmurs.  ABDOMEN/GI: Soft, Nontender, Nondistended; BS present  EXTREMITIES: Peripheral pulses intact. No edema B/L LE. Dressing over Rt knee.   NEUROLOGIC:  No motor or sensory deficit.  PSYCH: Alert & oriented x 3    Consultant(s) Notes Reviewed:  [x ] YES  [ ] NO  Care Discussed with Consultants/Other Providers [ x] YES  [ ] NO    EKG reviewed  Telemetry reviewed    LABS:                        11.4   12.77 )-----------( 346      ( 2020 06:02 )             36.2     02-14    133<L>  |  84<L>  |  12  ----------------------------<  211<H>  3.8   |  35<H>  |  1.0    Ca    9.1      2020 06:02  Mg     1.8     02-14    TPro  6.3  /  Alb  3.3<L>  /  TBili  2.2<H>  /  DBili  x   /  AST  20  /  ALT  11  /  AlkPhos  131<H>  02-14              RADIOLOGY & ADDITIONAL TESTS:      Imaging or report Personally Reviewed:  [ ] YES  [ ] NO    Medications:  Standing  acetaminophen   Tablet .. 650 milliGRAM(s) Oral every 6 hours  ALPRAZolam 0.25 milliGRAM(s) Oral daily  aspirin enteric coated 81 milliGRAM(s) Oral daily  atorvastatin 40 milliGRAM(s) Oral at bedtime  ceFAZolin   IVPB 1000 milliGRAM(s) IV Intermittent every 8 hours  dextrose 5%. 1000 milliLiter(s) IV Continuous <Continuous>  dextrose 50% Injectable 25 Gram(s) IV Push once  digoxin     Tablet 0.125 milliGRAM(s) Oral daily  DULoxetine 90 milliGRAM(s) Oral daily  famotidine    Tablet 40 milliGRAM(s) Oral once  furosemide    Tablet 40 milliGRAM(s) Oral daily  insulin lispro (HumaLOG) corrective regimen sliding scale   SubCutaneous three times a day before meals  insulin lispro Injectable (HumaLOG) 3 Unit(s) SubCutaneous before breakfast  insulin lispro Injectable (HumaLOG) 3 Unit(s) SubCutaneous before lunch  insulin lispro Injectable (HumaLOG) 3 Unit(s) SubCutaneous before dinner  lactated ringers. 1000 milliLiter(s) IV Continuous <Continuous>  magnesium oxide 400 milliGRAM(s) Oral three times a day with meals  metolazone 2.5 milliGRAM(s) Oral daily  metoprolol succinate  milliGRAM(s) Oral daily  pantoprazole    Tablet 40 milliGRAM(s) Oral before breakfast  sodium chloride 0.9%. 500 milliLiter(s) IV Continuous <Continuous>  tamsulosin 0.4 milliGRAM(s) Oral at bedtime  trimethoprim  160 mG/sulfamethoxazole 800 mG 1 Tablet(s) Oral two times a day    PRN Meds  dextrose 40% Gel 15 Gram(s) Oral once PRN  glucagon  Injectable 1 milliGRAM(s) IntraMuscular once PRN      Case discussed with resident    Care discussed with pt/family

## 2020-02-14 NOTE — DISCHARGE NOTE NURSING/CASE MANAGEMENT/SOCIAL WORK - PATIENT PORTAL LINK FT
You can access the FollowMyHealth Patient Portal offered by [facility] by registering at the following website: [email or web address]. By joining [employer]’s FollowMyHealth portal, you will also be able to view your health information using other applications (apps) compatible with our system.

## 2020-02-14 NOTE — DISCHARGE NOTE PROVIDER - NSDCMRMEDTOKEN_GEN_ALL_CORE_FT
acetaminophen 325 mg oral tablet: 2 tab(s) orally every 6 hours, As Needed for pain  aspirin 81 mg oral delayed release tablet: 1 tab(s) orally once a day  atorvastatin 40 mg oral tablet: 1 tab(s) orally once a day  digoxin 125 mcg (0.125 mg) oral tablet: 1 tab(s) orally once a day  DULoxetine 30 mg oral delayed release capsule: 3 cap(s) orally once a day  furosemide 40 mg oral tablet: 1 tab(s) orally once a day  Jardiance:   metOLazone 2.5 mg oral tablet: 1 tab(s) orally once a day, 30 min before lasix  Metoprolol Succinate ER 50 mg oral tablet, extended release: 3 tab(s) orally once a day  pantoprazole 40 mg oral delayed release tablet: 1 tab(s) orally once a day  prasugrel 10 mg oral tablet: 1 tab(s) orally once a day  sulfamethoxazole-trimethoprim 800 mg-160 mg oral tablet: 1 tab(s) orally 2 times a day. Last dose on 2/19  sulfamethoxazole-trimethoprim 800 mg-160 mg oral tablet: 1 tab(s) orally 2 times a day  tamsulosin 0.4 mg oral capsule: 1 cap(s) orally once a day (at bedtime)  Trulicity Pen: subcutaneous once a week  Zetia 10 mg oral tablet: 1 tab(s) orally once a day (at bedtime) acetaminophen 325 mg oral tablet: 2 tab(s) orally every 6 hours, As Needed for pain  aspirin 81 mg oral delayed release tablet: 1 tab(s) orally once a day  atorvastatin 40 mg oral tablet: 1 tab(s) orally once a day  digoxin 125 mcg (0.125 mg) oral tablet: 1 tab(s) orally once a day  DULoxetine 30 mg oral delayed release capsule: 3 cap(s) orally once a day  furosemide 40 mg oral tablet: 1 tab(s) orally once a day  Jardiance:   metOLazone 2.5 mg oral tablet: 1 tab(s) orally once a day, 30 min before lasix  Metoprolol Succinate ER 50 mg oral tablet, extended release: 3 tab(s) orally once a day  pantoprazole 40 mg oral delayed release tablet: 1 tab(s) orally once a day  prasugrel 10 mg oral tablet: 1 tab(s) orally once a day  sulfamethoxazole-trimethoprim 800 mg-160 mg oral tablet: 1 tab(s) orally 2 times a day. Last dose on 2/19  tamsulosin 0.4 mg oral capsule: 1 cap(s) orally once a day (at bedtime)  Trulicity Pen: subcutaneous once a week  Zetia 10 mg oral tablet: 1 tab(s) orally once a day (at bedtime)

## 2020-02-14 NOTE — DISCHARGE NOTE PROVIDER - PROVIDER TOKENS
PROVIDER:[TOKEN:[[id number]],FOLLOWUP:[2 weeks]],PROVIDER:[TOKEN:[[id number]],FOLLOWUP:[2 weeks]],PROVIDER:[TOKEN:[[id number]],FOLLOWUP:[2 weeks]]
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
## 2020-02-14 NOTE — PROGRESS NOTE ADULT - ASSESSMENT
63 YO M with a PMH of CAD s/p CABG (LIMA to LAD), s/p PCI of RCA with instent thrombosis plavix resistance on Effient, ischemic CM/HFrEF (25%), DM (on insulin pump), pulmonary hypertension, Celiac disease, diverticulitis, s/p right total knee replacement October 2019 complicated by infection s/p abx course w/ PICC, presenting following a mechanical fall found to have right hip IT fracture. Pt s/p BiV ICD placement with Dr Fernandes.    Impression:  ICM EF 25% sp BiV ICD ([facility])  CAD sp CABG  Pulmonary HTN  h/o TKR Replacement complicated by infection  s/p repair of R Hip Fx    Recommend:  -will re-interrogate the device to optimized LV pacing  -if pt. discharged prior to interrogation, he can be seen as outpatient [facility], in 3 weeks  -continue with GDMT for CAD, and HFrEF 63 YO M with a PMH of CAD s/p CABG (LIMA to LAD), s/p PCI of RCA with instent thrombosis plavix resistance on Effient, ischemic CM/HFrEF (25%), DM (on insulin pump), pulmonary hypertension, Celiac disease, diverticulitis, s/p right total knee replacement October 2019 complicated by infection s/p abx course w/ PICC, presenting following a mechanical fall found to have right hip IT fracture. Pt s/p BiV ICD placement with Dr Fernandes.    Impression:  ICM EF 25% sp BiV ICD ([facility])  CAD sp CABG  Pulmonary HTN  h/o TKR Replacement complicated by infection  s/p repair of R Hip Fx    Recommend:  -will re-interrogate the device to optimize LV pacing  -if pt. discharged prior to interrogation, he can be seen as outpatient in the office @[facility], in 3 weeks  -continue with GDMT for CAD, and HFrEF

## 2020-02-14 NOTE — PROGRESS NOTE ADULT - REASON FOR ADMISSION
intertrochanteric fracture of right hip
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
## 2020-02-14 NOTE — PROGRESS NOTE ADULT - SUBJECTIVE AND OBJECTIVE BOX
SUBJ:  Pt. denies any current complaints, reports that he has been walking with the physical therapist, and awaiting to be discharged to NH/rehab    MEDICATIONS  (STANDING):  acetaminophen   Tablet .. 650 milliGRAM(s) Oral every 6 hours  ALPRAZolam 0.25 milliGRAM(s) Oral daily  aspirin enteric coated 81 milliGRAM(s) Oral daily  atorvastatin 40 milliGRAM(s) Oral at bedtime  ceFAZolin   IVPB 1000 milliGRAM(s) IV Intermittent every 8 hours  dextrose 5%. 1000 milliLiter(s) (50 mL/Hr) IV Continuous <Continuous>  dextrose 50% Injectable 25 Gram(s) IV Push once  digoxin     Tablet 0.125 milliGRAM(s) Oral daily  DULoxetine 90 milliGRAM(s) Oral daily  furosemide    Tablet 40 milliGRAM(s) Oral daily  insulin lispro (HumaLOG) corrective regimen sliding scale   SubCutaneous three times a day before meals  insulin lispro Injectable (HumaLOG) 3 Unit(s) SubCutaneous before breakfast  insulin lispro Injectable (HumaLOG) 3 Unit(s) SubCutaneous before lunch  insulin lispro Injectable (HumaLOG) 3 Unit(s) SubCutaneous before dinner  lactated ringers. 1000 milliLiter(s) (20 mL/Hr) IV Continuous <Continuous>  magnesium oxide 400 milliGRAM(s) Oral three times a day with meals  metolazone 2.5 milliGRAM(s) Oral daily  metoprolol succinate  milliGRAM(s) Oral daily  pantoprazole    Tablet 40 milliGRAM(s) Oral before breakfast  sodium chloride 0.9%. 500 milliLiter(s) (500 mL/Hr) IV Continuous <Continuous>  tamsulosin 0.4 milliGRAM(s) Oral at bedtime  trimethoprim  160 mG/sulfamethoxazole 800 mG 1 Tablet(s) Oral two times a day    MEDICATIONS  (PRN):  dextrose 40% Gel 15 Gram(s) Oral once PRN Blood Glucose LESS THAN 70 milliGRAM(s)/deciliter  glucagon  Injectable 1 milliGRAM(s) IntraMuscular once PRN Glucose LESS THAN 70 milligrams/deciliter        Vital Signs Last 24 Hrs  T(C): 36.6 (14 Feb 2020 14:16), Max: 36.6 (14 Feb 2020 14:16)  T(F): 97.9 (14 Feb 2020 14:16), Max: 97.9 (14 Feb 2020 14:16)  HR: 69 (14 Feb 2020 14:16) (69 - 76)  BP: 98/69 (14 Feb 2020 14:16) (98/69 - 112/55)  BP(mean): --  RR: 18 (14 Feb 2020 14:16) (18 - 18)  SpO2: --     REVIEW OF SYSTEMS:  CONSTITUTIONAL: No fever, weight loss, or fatigue  CARDIOLOGY: PAtient denies chest pain, shortness of breath or syncopal episodes.   RESPIRATORY: denies shortness of breath, wheezing   NEUROLOGICAL: NO weakness, no focal deficits to report.  ENDOCRINOLOGICAL: no recent change in diabetic medications.   GI: no BRBPR, no N,V,diarrhea.    PSYCHIATRY: normal mood and affect  HEENT: no nasal discharge, no ecchymosis  SKIN: no ecchymosis, no breakdown  MUSCULOSKELETAL: Full range of motion x4.        PHYSICAL EXAM:  General Appearance: well appearing, normal for age and gender. 	  Neck: normal JVP, no bruit.   Eyes: No xanthelasma, Extra ocular muscles intact.   Cardiovascular: regular rate and rhythm S1 S2, No JVD, No murmurs, No edema  Respiratory: decreased breath sounds at lung bases	  Psychiatry: Alert and oriented x 3, Mood & affect appropriate  Gastrointestinal:  Soft, Non-tender  Skin/Integument: No rashes, No ecchymosis, No cyanosis	  Neurologic: Non-focal  Musculoskeletal/ extremities: Normal range of motion, No clubbing, cyanosis or edema  Vascular: Peripheral pulses palpable 2+ bilaterally  	  TELEMETRY:    ECG:  NSR@75, BiV pacing    TTE:  Transthoracic Echocardiogram (02.13.20 @ 14:37) >   1. Left ventricular ejection fraction, by visual estimation, is 25 to 30%.   2. Severely decreased segmental left ventricular systolic function.   3. Moderate to severely increased left ventricular internal cavity size.   4. Mild to moderate mitral valve regurgitation.   5. Mild-moderate tricuspid regurgitation.   6. Mild aortic regurgitation.   7. Estimated pulmonary artery systolic pressure is 51.8 mmHg assuming a right atrial pressure of 15 mmHg, whichis consistent with moderate pulmonary hypertension.          LABS:                        11.4   12.77 )-----------( 346      ( 14 Feb 2020 06:02 )             36.2     02-14    133<L>  |  84<L>  |  12  ----------------------------<  211<H>  3.8   |  35<H>  |  1.0    Ca    9.1      14 Feb 2020 06:02  Mg     1.8     02-14    TPro  6.3  /  Alb  3.3<L>  /  TBili  2.2<H>  /  DBili  x   /  AST  20  /  ALT  11  /  AlkPhos  131<H>  02-14        I&O's Summary    13 Feb 2020 07:01  -  14 Feb 2020 07:00  --------------------------------------------------------  IN: 800 mL / OUT: 3400 mL / NET: -2600 mL    14 Feb 2020 07:01  -  14 Feb 2020 16:08  --------------------------------------------------------  IN: 900 mL / OUT: 400 mL / NET: 500 mL

## 2020-02-14 NOTE — DISCHARGE NOTE PROVIDER - CARE PROVIDERS DIRECT ADDRESSES
[email or web address]

## 2020-02-14 NOTE — DISCHARGE NOTE PROVIDER - CARE PROVIDER_API CALL
Juvenal Mejia)  Cardiac Electrophysiology; Cardiovascular Disease; [facility] Medicine  [address] [facility], [address] [place] [address]  Phone: [phone]  Fax: [phone]  Follow Up Time: 2 weeks    Hector Vega)  Plastic Surgery  [address] [place] [address]  Phone: [phone]  Fax: [phone]  Follow Up Time: 2 weeks    Roney Fernandes)  Surgery; Thoracic and Cardiac Surgery  [address] [facility] [facility], [address]  [place] [address]  Phone: [phone]  Fax: [phone]  Follow Up Time: 2 weeks

## 2020-02-14 NOTE — DISCHARGE NOTE PROVIDER - NSDCCPCAREPLAN_GEN_ALL_CORE_FT
PRINCIPAL DISCHARGE DIAGNOSIS  Diagnosis: Intertrochanteric fracture of right hip  Assessment and Plan of Treatment: weight bearing as tolerated for right leg  apply adaptic/kerlix/ACE to right knee daily. Right thigh duoderm to be changed every Monday, Wednesday, Friday.          - Do not lift L arm above shoulder height or lift > 5 lbs x 1 month  - Follow up with Dr Fernandes as scheduled, keep dressing in place until FU  - Leave steri strips in place until FU with Dr eMjia  - Follow up with Dr Mejia in 3-4 weeks

## 2020-02-18 ENCOUNTER — APPOINTMENT (OUTPATIENT)
Dept: CARDIOTHORACIC SURGERY | Facility: CLINIC | Age: 65
End: 2020-02-18
Payer: COMMERCIAL

## 2020-02-18 VITALS
HEIGHT: 73 IN | TEMPERATURE: 98.4 F | SYSTOLIC BLOOD PRESSURE: 94 MMHG | HEART RATE: 76 BPM | DIASTOLIC BLOOD PRESSURE: 61 MMHG | OXYGEN SATURATION: 94 % | RESPIRATION RATE: 12 BRPM

## 2020-02-18 DIAGNOSIS — Z95.810 PRESENCE OF AUTOMATIC (IMPLANTABLE) CARDIAC DEFIBRILLATOR: ICD-10-CM

## 2020-02-18 PROCEDURE — 99024 POSTOP FOLLOW-UP VISIT: CPT

## 2020-02-18 NOTE — CDI QUERY NOTE - NSCDIOTHERTXTBX_GEN_ALL_CORE_HH
Patient admitted with intertrochanteric fracture of right hip s/p fall,  s/p ORIF this admission.  Patient noted with total knee replacement 10/2019, complicated by infection s/p extended IV antibiotic course.   PN by Attending MD , 2/7/20 noted - Encephalopathy due to Delirium  - No narcotics as the pt becomes confused. Can give Toradol PRN for pain   PN 2/13/19- Encephalopathy due to Delirium - resolved.    Please clarify type of encephalopathy-  -Toxic encephalopathy  -Other- specify  -Unable to determine

## 2020-02-20 ENCOUNTER — INPATIENT (INPATIENT)
Facility: HOSPITAL | Age: 65
LOS: 27 days | Discharge: NURSING, REHAB OR LONG-TERM CARE | End: 2020-03-19
Attending: INTERNAL MEDICINE | Admitting: INTERNAL MEDICINE
Payer: COMMERCIAL

## 2020-02-20 VITALS
DIASTOLIC BLOOD PRESSURE: 58 MMHG | HEIGHT: 74 IN | OXYGEN SATURATION: 94 % | TEMPERATURE: 99 F | RESPIRATION RATE: 18 BRPM | SYSTOLIC BLOOD PRESSURE: 112 MMHG | HEART RATE: 78 BPM | WEIGHT: 199.96 LBS

## 2020-02-20 DIAGNOSIS — Z95.5 PRESENCE OF CORONARY ANGIOPLASTY IMPLANT AND GRAFT: Chronic | ICD-10-CM

## 2020-02-20 DIAGNOSIS — Z41.9 ENCOUNTER FOR PROCEDURE FOR PURPOSES OTHER THAN REMEDYING HEALTH STATE, UNSPECIFIED: Chronic | ICD-10-CM
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
## 2020-02-20 DIAGNOSIS — Z95.1 PRESENCE OF AORTOCORONARY BYPASS GRAFT: Chronic | ICD-10-CM

## 2020-02-20 DIAGNOSIS — Z96.651 PRESENCE OF RIGHT ARTIFICIAL KNEE JOINT: Chronic | ICD-10-CM

## 2020-02-20 PROCEDURE — 99285 EMERGENCY DEPT VISIT HI MDM: CPT

## 2020-02-20 RX ORDER — SODIUM CHLORIDE 9 MG/ML
250 INJECTION INTRAMUSCULAR; INTRAVENOUS; SUBCUTANEOUS ONCE
Refills: 0 | Status: COMPLETED | OUTPATIENT
Start: 2020-02-20 | End: 2020-02-20

## 2020-02-20 NOTE — ED ADULT TRIAGE NOTE - CHIEF COMPLAINT QUOTE
BIBA for AMS, as per EMS pt was aggressive towards staff. Pt is AxOx3, Denies any complaints. Pt cooperative in triage. BIBA from Nursing home for AMS, as per EMS pt was aggressive towards staff at nursing home. Pt is AxOx3 in triage, Denies any complaints. Pt cooperative in triage.

## 2020-02-21 DIAGNOSIS — Z98.890 OTHER SPECIFIED POSTPROCEDURAL STATES: Chronic | ICD-10-CM

## 2020-02-21 LAB
ALBUMIN SERPL ELPH-MCNC: 3.7 G/DL — SIGNIFICANT CHANGE UP (ref 3.5–5.2)
ALBUMIN SERPL ELPH-MCNC: 4.1 G/DL — SIGNIFICANT CHANGE UP (ref 3.5–5.2)
ALP SERPL-CCNC: 233 U/L — HIGH (ref 30–115)
ALP SERPL-CCNC: 261 U/L — HIGH (ref 30–115)
ALT FLD-CCNC: 19 U/L — SIGNIFICANT CHANGE UP (ref 0–41)
ALT FLD-CCNC: 21 U/L — SIGNIFICANT CHANGE UP (ref 0–41)
AMMONIA BLD-MCNC: 20 UMOL/L — SIGNIFICANT CHANGE UP (ref 11–55)
ANION GAP SERPL CALC-SCNC: 14 MMOL/L — SIGNIFICANT CHANGE UP (ref 7–14)
ANION GAP SERPL CALC-SCNC: 18 MMOL/L — HIGH (ref 7–14)
APPEARANCE UR: ABNORMAL
APTT BLD: 23.8 SEC — CRITICAL LOW (ref 27–39.2)
AST SERPL-CCNC: 31 U/L — SIGNIFICANT CHANGE UP (ref 0–41)
AST SERPL-CCNC: 32 U/L — SIGNIFICANT CHANGE UP (ref 0–41)
BACTERIA # UR AUTO: ABNORMAL
BASE EXCESS BLDV CALC-SCNC: 5.7 MMOL/L — HIGH (ref -2–2)
BASOPHILS # BLD AUTO: 0.08 K/UL — SIGNIFICANT CHANGE UP (ref 0–0.2)
BASOPHILS # BLD AUTO: 0.09 K/UL — SIGNIFICANT CHANGE UP (ref 0–0.2)
BASOPHILS NFR BLD AUTO: 0.6 % — SIGNIFICANT CHANGE UP (ref 0–1)
BASOPHILS NFR BLD AUTO: 0.7 % — SIGNIFICANT CHANGE UP (ref 0–1)
BILIRUB SERPL-MCNC: 2.3 MG/DL — HIGH (ref 0.2–1.2)
BILIRUB SERPL-MCNC: 2.5 MG/DL — HIGH (ref 0.2–1.2)
BILIRUB UR-MCNC: NEGATIVE — SIGNIFICANT CHANGE UP
BUN SERPL-MCNC: 38 MG/DL — HIGH (ref 10–20)
BUN SERPL-MCNC: 50 MG/DL — HIGH (ref 10–20)
CA-I SERPL-SCNC: 1.13 MMOL/L — SIGNIFICANT CHANGE UP (ref 1.12–1.3)
CALCIUM SERPL-MCNC: 9.2 MG/DL — SIGNIFICANT CHANGE UP (ref 8.5–10.1)
CALCIUM SERPL-MCNC: 9.8 MG/DL — SIGNIFICANT CHANGE UP (ref 8.5–10.1)
CHLORIDE SERPL-SCNC: 84 MMOL/L — LOW (ref 98–110)
CHLORIDE SERPL-SCNC: 89 MMOL/L — LOW (ref 98–110)
CK MB CFR SERPL CALC: 4.8 NG/ML — SIGNIFICANT CHANGE UP (ref 0.6–6.3)
CK SERPL-CCNC: 163 U/L — SIGNIFICANT CHANGE UP (ref 0–225)
CO2 SERPL-SCNC: 28 MMOL/L — SIGNIFICANT CHANGE UP (ref 17–32)
CO2 SERPL-SCNC: 29 MMOL/L — SIGNIFICANT CHANGE UP (ref 17–32)
COLOR SPEC: ABNORMAL
CREAT SERPL-MCNC: 1.5 MG/DL — SIGNIFICANT CHANGE UP (ref 0.7–1.5)
CREAT SERPL-MCNC: 1.9 MG/DL — HIGH (ref 0.7–1.5)
DIFF PNL FLD: ABNORMAL
EOSINOPHIL # BLD AUTO: 0.1 K/UL — SIGNIFICANT CHANGE UP (ref 0–0.7)
EOSINOPHIL # BLD AUTO: 0.25 K/UL — SIGNIFICANT CHANGE UP (ref 0–0.7)
EOSINOPHIL NFR BLD AUTO: 0.8 % — SIGNIFICANT CHANGE UP (ref 0–8)
EOSINOPHIL NFR BLD AUTO: 2 % — SIGNIFICANT CHANGE UP (ref 0–8)
EPI CELLS # UR: 0 /HPF — SIGNIFICANT CHANGE UP (ref 0–5)
GAS PNL BLDV: 130 MMOL/L — LOW (ref 136–145)
GAS PNL BLDV: SIGNIFICANT CHANGE UP
GLUCOSE BLDC GLUCOMTR-MCNC: 133 MG/DL — HIGH (ref 70–99)
GLUCOSE BLDC GLUCOMTR-MCNC: 169 MG/DL — HIGH (ref 70–99)
GLUCOSE BLDC GLUCOMTR-MCNC: 193 MG/DL — HIGH (ref 70–99)
GLUCOSE BLDC GLUCOMTR-MCNC: 233 MG/DL — HIGH (ref 70–99)
GLUCOSE SERPL-MCNC: 195 MG/DL — HIGH (ref 70–99)
GLUCOSE SERPL-MCNC: 368 MG/DL — HIGH (ref 70–99)
GLUCOSE UR QL: ABNORMAL
HCO3 BLDV-SCNC: 32 MMOL/L — HIGH (ref 22–29)
HCT VFR BLD CALC: 38.9 % — LOW (ref 42–52)
HCT VFR BLD CALC: 40.4 % — LOW (ref 42–52)
HCT VFR BLDA CALC: 37.3 % — SIGNIFICANT CHANGE UP (ref 34–44)
HGB BLD CALC-MCNC: 12.2 G/DL — LOW (ref 14–18)
HGB BLD-MCNC: 11.9 G/DL — LOW (ref 14–18)
HGB BLD-MCNC: 12.3 G/DL — LOW (ref 14–18)
HYALINE CASTS # UR AUTO: 3 /LPF — SIGNIFICANT CHANGE UP (ref 0–7)
IMM GRANULOCYTES NFR BLD AUTO: 0.4 % — HIGH (ref 0.1–0.3)
IMM GRANULOCYTES NFR BLD AUTO: 0.5 % — HIGH (ref 0.1–0.3)
INR BLD: 1.24 RATIO — SIGNIFICANT CHANGE UP (ref 0.65–1.3)
IRON SATN MFR SERPL: 19 % — SIGNIFICANT CHANGE UP (ref 15–50)
IRON SATN MFR SERPL: 64 UG/DL — SIGNIFICANT CHANGE UP (ref 35–150)
KETONES UR-MCNC: NEGATIVE — SIGNIFICANT CHANGE UP
LACTATE BLDV-MCNC: 2.8 MMOL/L — HIGH (ref 0.5–1.6)
LACTATE SERPL-SCNC: 1.2 MMOL/L — SIGNIFICANT CHANGE UP (ref 0.7–2)
LACTATE SERPL-SCNC: 2.4 MMOL/L — HIGH (ref 0.7–2)
LEUKOCYTE ESTERASE UR-ACNC: ABNORMAL
LIDOCAIN IGE QN: 41 U/L — SIGNIFICANT CHANGE UP (ref 7–60)
LYMPHOCYTES # BLD AUTO: 0.7 K/UL — LOW (ref 1.2–3.4)
LYMPHOCYTES # BLD AUTO: 1.03 K/UL — LOW (ref 1.2–3.4)
LYMPHOCYTES # BLD AUTO: 5.7 % — LOW (ref 20.5–51.1)
LYMPHOCYTES # BLD AUTO: 8.3 % — LOW (ref 20.5–51.1)
MAGNESIUM SERPL-MCNC: 2.6 MG/DL — HIGH (ref 1.8–2.4)
MCHC RBC-ENTMCNC: 21.3 PG — LOW (ref 27–31)
MCHC RBC-ENTMCNC: 21.7 PG — LOW (ref 27–31)
MCHC RBC-ENTMCNC: 30.4 G/DL — LOW (ref 32–37)
MCHC RBC-ENTMCNC: 30.6 G/DL — LOW (ref 32–37)
MCV RBC AUTO: 70 FL — LOW (ref 80–94)
MCV RBC AUTO: 70.9 FL — LOW (ref 80–94)
MONOCYTES # BLD AUTO: 1.06 K/UL — HIGH (ref 0.1–0.6)
MONOCYTES # BLD AUTO: 1.41 K/UL — HIGH (ref 0.1–0.6)
MONOCYTES NFR BLD AUTO: 11.3 % — HIGH (ref 1.7–9.3)
MONOCYTES NFR BLD AUTO: 8.6 % — SIGNIFICANT CHANGE UP (ref 1.7–9.3)
NEUTROPHILS # BLD AUTO: 10.35 K/UL — HIGH (ref 1.4–6.5)
NEUTROPHILS # BLD AUTO: 9.63 K/UL — HIGH (ref 1.4–6.5)
NEUTROPHILS NFR BLD AUTO: 77.2 % — HIGH (ref 42.2–75.2)
NEUTROPHILS NFR BLD AUTO: 83.9 % — HIGH (ref 42.2–75.2)
NITRITE UR-MCNC: NEGATIVE — SIGNIFICANT CHANGE UP
NRBC # BLD: 0 /100 WBCS — SIGNIFICANT CHANGE UP (ref 0–0)
NRBC # BLD: 0 /100 WBCS — SIGNIFICANT CHANGE UP (ref 0–0)
PCO2 BLDV: 52 MMHG — HIGH (ref 41–51)
PH BLDV: 7.39 — SIGNIFICANT CHANGE UP (ref 7.26–7.43)
PH UR: 6.5 — SIGNIFICANT CHANGE UP (ref 5–8)
PLATELET # BLD AUTO: 306 K/UL — SIGNIFICANT CHANGE UP (ref 130–400)
PLATELET # BLD AUTO: 309 K/UL — SIGNIFICANT CHANGE UP (ref 130–400)
PO2 BLDV: 35 MMHG — SIGNIFICANT CHANGE UP (ref 20–40)
POTASSIUM BLDV-SCNC: 4.3 MMOL/L — SIGNIFICANT CHANGE UP (ref 3.3–5.6)
POTASSIUM SERPL-MCNC: 4.1 MMOL/L — SIGNIFICANT CHANGE UP (ref 3.5–5)
POTASSIUM SERPL-MCNC: 5.2 MMOL/L — HIGH (ref 3.5–5)
POTASSIUM SERPL-SCNC: 4.1 MMOL/L — SIGNIFICANT CHANGE UP (ref 3.5–5)
POTASSIUM SERPL-SCNC: 5.2 MMOL/L — HIGH (ref 3.5–5)
PROT SERPL-MCNC: 7.3 G/DL — SIGNIFICANT CHANGE UP (ref 6–8)
PROT SERPL-MCNC: 8 G/DL — SIGNIFICANT CHANGE UP (ref 6–8)
PROT UR-MCNC: SIGNIFICANT CHANGE UP
PROTHROM AB SERPL-ACNC: 14.3 SEC — HIGH (ref 9.95–12.87)
RBC # BLD: 5.49 M/UL — SIGNIFICANT CHANGE UP (ref 4.7–6.1)
RBC # BLD: 5.77 M/UL — SIGNIFICANT CHANGE UP (ref 4.7–6.1)
RBC # FLD: 22.7 % — HIGH (ref 11.5–14.5)
RBC # FLD: 22.8 % — HIGH (ref 11.5–14.5)
RBC CASTS # UR COMP ASSIST: >720 /HPF — HIGH (ref 0–4)
SAO2 % BLDV: 58 % — SIGNIFICANT CHANGE UP
SODIUM SERPL-SCNC: 130 MMOL/L — LOW (ref 135–146)
SODIUM SERPL-SCNC: 132 MMOL/L — LOW (ref 135–146)
SP GR SPEC: 1.02 — SIGNIFICANT CHANGE UP (ref 1.01–1.02)
TIBC SERPL-MCNC: 331 UG/DL — SIGNIFICANT CHANGE UP (ref 220–430)
TRANSFERRIN SERPL-MCNC: 276 MG/DL — SIGNIFICANT CHANGE UP (ref 200–360)
TROPONIN T SERPL-MCNC: 0.06 NG/ML — CRITICAL HIGH
TROPONIN T SERPL-MCNC: 0.07 NG/ML — CRITICAL HIGH
UIBC SERPL-MCNC: 267 UG/DL — SIGNIFICANT CHANGE UP (ref 110–370)
UROBILINOGEN FLD QL: ABNORMAL
WBC # BLD: 12.34 K/UL — HIGH (ref 4.8–10.8)
WBC # BLD: 12.47 K/UL — HIGH (ref 4.8–10.8)
WBC # FLD AUTO: 12.34 K/UL — HIGH (ref 4.8–10.8)
WBC # FLD AUTO: 12.47 K/UL — HIGH (ref 4.8–10.8)
WBC UR QL: 81 /HPF — HIGH (ref 0–5)

## 2020-02-21 PROCEDURE — 70450 CT HEAD/BRAIN W/O DYE: CPT | Mod: 26

## 2020-02-21 PROCEDURE — 76770 US EXAM ABDO BACK WALL COMP: CPT | Mod: 26

## 2020-02-21 PROCEDURE — 99223 1ST HOSP IP/OBS HIGH 75: CPT | Mod: AI

## 2020-02-21 PROCEDURE — 71045 X-RAY EXAM CHEST 1 VIEW: CPT | Mod: 26

## 2020-02-21 RX ORDER — ASPIRIN/CALCIUM CARB/MAGNESIUM 324 MG
81 TABLET ORAL DAILY
Refills: 0 | Status: DISCONTINUED | OUTPATIENT
Start: 2020-02-21 | End: 2020-03-19

## 2020-02-21 RX ORDER — DEXTROSE 50 % IN WATER 50 %
25 SYRINGE (ML) INTRAVENOUS ONCE
Refills: 0 | Status: DISCONTINUED | OUTPATIENT
Start: 2020-02-21 | End: 2020-03-19

## 2020-02-21 RX ORDER — METOPROLOL TARTRATE 50 MG
150 TABLET ORAL DAILY
Refills: 0 | Status: DISCONTINUED | OUTPATIENT
Start: 2020-02-21 | End: 2020-02-26

## 2020-02-21 RX ORDER — ACETAMINOPHEN 500 MG
650 TABLET ORAL EVERY 6 HOURS
Refills: 0 | Status: DISCONTINUED | OUTPATIENT
Start: 2020-02-21 | End: 2020-03-19

## 2020-02-21 RX ORDER — DEXTROSE 50 % IN WATER 50 %
12.5 SYRINGE (ML) INTRAVENOUS ONCE
Refills: 0 | Status: DISCONTINUED | OUTPATIENT
Start: 2020-02-21 | End: 2020-03-19

## 2020-02-21 RX ORDER — SODIUM CHLORIDE 9 MG/ML
1000 INJECTION, SOLUTION INTRAVENOUS
Refills: 0 | Status: DISCONTINUED | OUTPATIENT
Start: 2020-02-21 | End: 2020-03-19

## 2020-02-21 RX ORDER — HEPARIN SODIUM 5000 [USP'U]/ML
5000 INJECTION INTRAVENOUS; SUBCUTANEOUS EVERY 8 HOURS
Refills: 0 | Status: DISCONTINUED | OUTPATIENT
Start: 2020-02-21 | End: 2020-02-28

## 2020-02-21 RX ORDER — SODIUM CHLORIDE 9 MG/ML
1000 INJECTION INTRAMUSCULAR; INTRAVENOUS; SUBCUTANEOUS
Refills: 0 | Status: DISCONTINUED | OUTPATIENT
Start: 2020-02-21 | End: 2020-02-22

## 2020-02-21 RX ORDER — DULOXETINE HYDROCHLORIDE 30 MG/1
90 CAPSULE, DELAYED RELEASE ORAL DAILY
Refills: 0 | Status: DISCONTINUED | OUTPATIENT
Start: 2020-02-21 | End: 2020-03-19

## 2020-02-21 RX ORDER — SODIUM CHLORIDE 9 MG/ML
250 INJECTION INTRAMUSCULAR; INTRAVENOUS; SUBCUTANEOUS ONCE
Refills: 0 | Status: COMPLETED | OUTPATIENT
Start: 2020-02-21 | End: 2020-02-21

## 2020-02-21 RX ORDER — PANTOPRAZOLE SODIUM 20 MG/1
40 TABLET, DELAYED RELEASE ORAL
Refills: 0 | Status: DISCONTINUED | OUTPATIENT
Start: 2020-02-21 | End: 2020-02-25

## 2020-02-21 RX ORDER — INSULIN GLARGINE 100 [IU]/ML
16 INJECTION, SOLUTION SUBCUTANEOUS AT BEDTIME
Refills: 0 | Status: DISCONTINUED | OUTPATIENT
Start: 2020-02-21 | End: 2020-02-22

## 2020-02-21 RX ORDER — MEROPENEM 1 G/30ML
1000 INJECTION INTRAVENOUS EVERY 8 HOURS
Refills: 0 | Status: DISCONTINUED | OUTPATIENT
Start: 2020-02-21 | End: 2020-02-21

## 2020-02-21 RX ORDER — DEXTROSE 50 % IN WATER 50 %
15 SYRINGE (ML) INTRAVENOUS ONCE
Refills: 0 | Status: DISCONTINUED | OUTPATIENT
Start: 2020-02-21 | End: 2020-03-19

## 2020-02-21 RX ORDER — INSULIN LISPRO 100/ML
8 VIAL (ML) SUBCUTANEOUS ONCE
Refills: 0 | Status: COMPLETED | OUTPATIENT
Start: 2020-02-21 | End: 2020-02-21

## 2020-02-21 RX ORDER — ATORVASTATIN CALCIUM 80 MG/1
40 TABLET, FILM COATED ORAL DAILY
Refills: 0 | Status: DISCONTINUED | OUTPATIENT
Start: 2020-02-21 | End: 2020-03-19

## 2020-02-21 RX ORDER — GLUCAGON INJECTION, SOLUTION 0.5 MG/.1ML
1 INJECTION, SOLUTION SUBCUTANEOUS ONCE
Refills: 0 | Status: DISCONTINUED | OUTPATIENT
Start: 2020-02-21 | End: 2020-03-19

## 2020-02-21 RX ORDER — TAMSULOSIN HYDROCHLORIDE 0.4 MG/1
0.4 CAPSULE ORAL AT BEDTIME
Refills: 0 | Status: DISCONTINUED | OUTPATIENT
Start: 2020-02-21 | End: 2020-03-19

## 2020-02-21 RX ORDER — ACETAMINOPHEN 500 MG
650 TABLET ORAL ONCE
Refills: 0 | Status: COMPLETED | OUTPATIENT
Start: 2020-02-21 | End: 2020-02-21

## 2020-02-21 RX ORDER — METOPROLOL TARTRATE 50 MG
50 TABLET ORAL DAILY
Refills: 0 | Status: DISCONTINUED | OUTPATIENT
Start: 2020-02-21 | End: 2020-02-21

## 2020-02-21 RX ORDER — PRASUGREL 5 MG/1
10 TABLET, FILM COATED ORAL DAILY
Refills: 0 | Status: DISCONTINUED | OUTPATIENT
Start: 2020-02-21 | End: 2020-02-28

## 2020-02-21 RX ORDER — INSULIN LISPRO 100/ML
VIAL (ML) SUBCUTANEOUS
Refills: 0 | Status: DISCONTINUED | OUTPATIENT
Start: 2020-02-21 | End: 2020-03-19

## 2020-02-21 RX ORDER — INSULIN LISPRO 100/ML
5 VIAL (ML) SUBCUTANEOUS
Refills: 0 | Status: DISCONTINUED | OUTPATIENT
Start: 2020-02-21 | End: 2020-02-22

## 2020-02-21 RX ORDER — DULOXETINE HYDROCHLORIDE 30 MG/1
30 CAPSULE, DELAYED RELEASE ORAL DAILY
Refills: 0 | Status: DISCONTINUED | OUTPATIENT
Start: 2020-02-21 | End: 2020-02-21

## 2020-02-21 RX ORDER — DIGOXIN 250 MCG
0.12 TABLET ORAL DAILY
Refills: 0 | Status: DISCONTINUED | OUTPATIENT
Start: 2020-02-21 | End: 2020-03-19

## 2020-02-21 RX ORDER — CEFTRIAXONE 500 MG/1
1000 INJECTION, POWDER, FOR SOLUTION INTRAMUSCULAR; INTRAVENOUS ONCE
Refills: 0 | Status: COMPLETED | OUTPATIENT
Start: 2020-02-21 | End: 2020-02-21

## 2020-02-21 RX ADMIN — ATORVASTATIN CALCIUM 40 MILLIGRAM(S): 80 TABLET, FILM COATED ORAL at 11:25

## 2020-02-21 RX ADMIN — SODIUM CHLORIDE 50 MILLILITER(S): 9 INJECTION INTRAMUSCULAR; INTRAVENOUS; SUBCUTANEOUS at 07:03

## 2020-02-21 RX ADMIN — SODIUM CHLORIDE 500 MILLILITER(S): 9 INJECTION INTRAMUSCULAR; INTRAVENOUS; SUBCUTANEOUS at 02:45

## 2020-02-21 RX ADMIN — TAMSULOSIN HYDROCHLORIDE 0.4 MILLIGRAM(S): 0.4 CAPSULE ORAL at 21:07

## 2020-02-21 RX ADMIN — Medication 8 UNIT(S): at 02:45

## 2020-02-21 RX ADMIN — SODIUM CHLORIDE 50 MILLILITER(S): 9 INJECTION INTRAMUSCULAR; INTRAVENOUS; SUBCUTANEOUS at 14:34

## 2020-02-21 RX ADMIN — INSULIN GLARGINE 16 UNIT(S): 100 INJECTION, SOLUTION SUBCUTANEOUS at 21:30

## 2020-02-21 RX ADMIN — Medication 2: at 08:53

## 2020-02-21 RX ADMIN — SODIUM CHLORIDE 250 MILLILITER(S): 9 INJECTION INTRAMUSCULAR; INTRAVENOUS; SUBCUTANEOUS at 02:56

## 2020-02-21 RX ADMIN — Medication 81 MILLIGRAM(S): at 11:05

## 2020-02-21 RX ADMIN — Medication 150 MILLIGRAM(S): at 11:04

## 2020-02-21 RX ADMIN — SODIUM CHLORIDE 500 MILLILITER(S): 9 INJECTION INTRAMUSCULAR; INTRAVENOUS; SUBCUTANEOUS at 00:00

## 2020-02-21 RX ADMIN — SODIUM CHLORIDE 250 MILLILITER(S): 9 INJECTION INTRAMUSCULAR; INTRAVENOUS; SUBCUTANEOUS at 03:39

## 2020-02-21 RX ADMIN — Medication 650 MILLIGRAM(S): at 07:00

## 2020-02-21 RX ADMIN — DULOXETINE HYDROCHLORIDE 90 MILLIGRAM(S): 30 CAPSULE, DELAYED RELEASE ORAL at 11:05

## 2020-02-21 RX ADMIN — CEFTRIAXONE 100 MILLIGRAM(S): 500 INJECTION, POWDER, FOR SOLUTION INTRAMUSCULAR; INTRAVENOUS at 03:29

## 2020-02-21 RX ADMIN — Medication 0: at 16:59

## 2020-02-21 RX ADMIN — MEROPENEM 100 MILLIGRAM(S): 1 INJECTION INTRAVENOUS at 14:33

## 2020-02-21 RX ADMIN — Medication 2: at 12:36

## 2020-02-21 RX ADMIN — Medication 5 UNIT(S): at 12:36

## 2020-02-21 RX ADMIN — HEPARIN SODIUM 5000 UNIT(S): 5000 INJECTION INTRAVENOUS; SUBCUTANEOUS at 21:07

## 2020-02-21 RX ADMIN — HEPARIN SODIUM 5000 UNIT(S): 5000 INJECTION INTRAVENOUS; SUBCUTANEOUS at 14:35

## 2020-02-21 RX ADMIN — MEROPENEM 100 MILLIGRAM(S): 1 INJECTION INTRAVENOUS at 06:31

## 2020-02-21 RX ADMIN — HEPARIN SODIUM 5000 UNIT(S): 5000 INJECTION INTRAVENOUS; SUBCUTANEOUS at 06:31

## 2020-02-21 RX ADMIN — PRASUGREL 10 MILLIGRAM(S): 5 TABLET, FILM COATED ORAL at 11:04

## 2020-02-21 NOTE — ED PROVIDER NOTE - CLINICAL SUMMARY MEDICAL DECISION MAKING FREE TEXT BOX
64 M p/w ams from NH patient was reported to be agitated and combative with staff. Pt agitated here, can be calmed down. vs wnl. Will need to r/o organic causes of pt's agitation. CTH neg for ich, midline shift, or hydrocephalus. review of labs noted to have +UA- LE. Will treat with ceftriaxone. admit to medicine for ams

## 2020-02-21 NOTE — H&P ADULT - HISTORY OF PRESENT ILLNESS
[64 year old gentleman]    CC: Altered mental status     PMH:     Past Surgical History:     History of Present Illness goes back to    63 yo male hx of CHF (EF 11%)/ HTN?HLD/DM/ recent right hip fracture with recent fields catheter, right thigh with skin graft BIBA from NH 2/2 AMS. as per NH paper, patient was agitated and combative with staff. Patient was claiming "there was a person sneaking around by his room and he wanted the staff from NH to call the Police." Patient also stated he was scared so he was screaming.  patient otherwise denies any hallucination/ HA/dizziness/chest pain/sob/abd pain/n/v/d. c/o right hip pain 2/2 recent hip surgery.    The patient is a 64 year-old male with a PMH of CAD s/p CABG, HFrEF (11%), DM (on insulin pump), pulmonary hypertension, Celiac disease, diverticulitis, s/p right total knee replacement October 2019 complicated by infection s/p abx course w/ PICC, presenting following a mechanical fall. He reports that over the last several days he has been feeling more tired; last night he felt especially tired and sat down in the chair. After then getting up to go to the kitchen, he "fell asleep standing," landing on his right buttock. He reports he had been asleep for about 20 seconds. He denies any associated loss of bladder or bowel continence, dizziness or lightheadedness, nausea or vomiting prior to or after the episode. His wife then called EMS and he presented to the ED. On presentation, vitals were T 97.8 F, HR 97, /78; trops 0.03. Right hip xray revealed an intertrochanteric fracture of the right hip, with varus angulation of the distal fragment, subtrochanteric extension with reverse obliquity and avulsion of the lesser trochanter. On admission the patient was resting comfortably; denied any chest pain, shortness of breath, dizziness, confusion, subjective fever, chills, nausea, vomiting, diarrhea. He lives w/ his wife at home; ambulates w/ a cane at baseline.    Patient is a 63yo male with PMH of CAD s/p CABG and PCI, pulmonary hypertension, hypertension, Celiac disease, diverticulitis, diabetes mellitus (on insulin pump), heart failure with reduced ejection fraction, anxiety, s/p right total knee replacement 10/2019 s/p mechanical fall complicated by infection s/p extended IV antibiotic course who presented s/p unwitnessed mechanical fall complicated by right hip fracture s/p ORIF.    In the ED, [64 year old gentleman]    CC: Altered mental status     PM/SH: HTN, DLD, CAD s/p CABG, HFrEF (11% on ECHO, 20-25% on MUGA scan in 2/2020), DM (on insulin pump), pulmonary hypertension, Celiac disease, diverticulitis, s/p right total knee replacement October 2019 complicated by infection s/p abx course w/ PICC and  right thigh with skin graft, right hip fracture s/p ORIF 2/2020     History of Present Illness goes back to the day of admission. The patient as send here from [facility] where he was for rehab after his recent ORIF. Per the records, patient was agitated and combative with staff. Patient was claiming "there was a person sneaking around by his room and he wanted the staff from NH to call the Police." Patient also stated he was scared so he was screaming.  Per the patient he does not know exactly what happened. He said he realized he was acting weird but was not aware of why. He said he got into confrontations with the nursing staff, the police and the EMS because they were being aggressive towards him.   Patient otherwise denies any hallucination/ HA/dizziness/chest pain/sob/abd pain/n/v/d.    In the ED, vitals were stable. Labs showed elevated WBC (it has been before). Na was 130 (became low after last admission), K was 5.2, creatinine was 1.9 from 0.8. Lactate was 2.4. UA was grossly positive. Per the ED team and signout and patient fields was changed and sample was from urine. Per their notes, the patient refused fields removal. [64 year old gentleman]    CC: Altered mental status     PM/SH: HTN, DLD, CAD s/p CABG, HFrEF (11% on ECHO, 20-25% on MUGA scan in 2/2020) s/p st [facility] BiV placement 2/2020, DM (on insulin pump), pulmonary hypertension, Celiac disease, diverticulitis, s/p right total knee replacement October 2019 complicated by infection s/p abx course w/ PICC and  right thigh with skin graft, right hip fracture s/p ORIF 2/2020     History of Present Illness goes back to the day of admission. The patient as send here from [facility] where he was for rehab after his recent ORIF. Per the records, patient was agitated and combative with staff. Patient was claiming "there was a person sneaking around by his room and he wanted the staff from NH to call the Police." Patient also stated he was scared so he was screaming.  Per the patient he does not know exactly what happened. He said he realized he was acting weird but was not aware of why. He said he got into confrontations with the nursing staff, the police and the EMS because they were being aggressive towards him.   Patient otherwise denies any hallucination/ HA/dizziness/chest pain/sob/abd pain/n/v/d.    In the ED, vitals were stable. Labs showed elevated WBC (it has been before). Na was 130 (became low after last admission), K was 5.2, creatinine was 1.9 from 0.8. Lactate was 2.4. UA was grossly positive. Per the ED team and signout and patient fields was changed and sample was from urine. Per their notes, the patient refused fields removal. [64 year old gentleman]    CC: Altered mental status     PM/SH: HTN, DLD, CAD s/p CABG, HFrEF (25-30% on ECHO 2/2020, 20-25% on MUGA scan in 12/2019) s/p st [facility] BiV placement 2/2020, DM (on insulin pump), pulmonary hypertension, Celiac disease, diverticulitis, s/p right total knee replacement October 2019 complicated by infection s/p abx course w/ PICC and  right thigh with skin graft, right hip fracture s/p ORIF 2/2020     History of Present Illness goes back to the day of admission. The patient as send here from [facility] where he was for rehab after his recent ORIF. Per the records, patient was agitated and combative with staff. Patient was claiming "there was a person sneaking around by his room and he wanted the staff from NH to call the Police." Patient also stated he was scared so he was screaming.  Per the patient he does not know exactly what happened. He said he realized he was acting weird but was not aware of why. He said he got into confrontations with the nursing staff, the police and the EMS because they were being aggressive towards him.   Patient otherwise denies any hallucination/ HA/dizziness/chest pain/sob/abd pain/n/v/d.    In the ED, vitals were stable. Labs showed elevated WBC (it has been before). Na was 130 (became low after last admission), K was 5.2, creatinine was 1.9 from 0.8. Lactate was 2.4. UA was grossly positive. Per the ED team and signout and patient fields was changed and sample was from urine. Per their notes, the patient refused fields removal.

## 2020-02-21 NOTE — ED PROVIDER NOTE - SEVERE SEPSIS ALERT DETAILS
Sepsis suspected at this time.   IVF bolus cannot be given due to: (X ) CHF (EF - 11%) ( ) CRF ( ) Hospice or comfort measures only ( ) Patient refusal

## 2020-02-21 NOTE — H&P ADULT - NSHPLABSRESULTS_GEN_ALL_CORE
===================== LABS =====================                        12.3   12.34 )-----------( 309      ( 2020 23:47 )             40.4         130<L>  |  84<L>  |  50<H>  ----------------------------<  368<H>  5.2<H>   |  28  |  1.9<H>    Ca    9.8      2020 23:47  Mg     2.6         TPro  8.0  /  Alb  4.1  /  TBili  2.5<H>  /  DBili  x   /  AST  32  /  ALT  21  /  AlkPhos  261<H>      PT/INR - ( 2020 23:47 )   PT: 14.30 sec;   INR: 1.24 ratio         PTT - ( 2020 23:47 )  PTT:23.8 sec  Urinalysis Basic - ( 2020 01:20 )    Color: Light Orange / Appearance: Slightly Turbid / S.024 / pH: x  Gluc: x / Ketone: Negative  / Bili: Negative / Urobili: 3 mg/dL   Blood: x / Protein: Trace / Nitrite: Negative   Leuk Esterase: Moderate / RBC: >720 /HPF / WBC 81 /HPF   Sq Epi: x / Non Sq Epi: 0 /HPF / Bacteria: Moderate    Troponin T, Serum: 0.07 ng/mL <HH> (20 @ 23:47)  Lactate, Blood: 2.4 mmol/L <H> (20 @ 23:47)    CARDIAC MARKERS ( 2020 23:47 )  x     / 0.07 ng/mL / x     / x     / x        ================== IMAGING ==================    < from: CT Head No Cont (20 @ 02:26) >    IMPRESSION:     1.  No acute intra-cranial pathology.    2.  Moderate chronic microvascular ischemic changes.     ================== EKG ==================    < from: 12 Lead ECG (20 @ 14:46) >    Diagnosis Line Atrial-sensed ventricular-paced rhythm with occasional Premature ventricular  complexes  Abnormal ECG

## 2020-02-21 NOTE — ED PROVIDER NOTE - PMH
Atherosclerosis of coronary artery  CAD (coronary artery disease)  Blood clot due to device, implant, or graft  was on blood thinners  CHF (congestive heart failure)  EF ~ 25%  Diabetes  on insulin pump  Diabetic neuropathy    Diverticulitis    History of celiac disease    HLD (hyperlipidemia)    HTN (hypertension)    MRSA bacteremia    Osteoarthritis    Status post percutaneous transluminal coronary angioplasty  in 2012  STEMI (ST elevation myocardial infarction)

## 2020-02-21 NOTE — CONSULT NOTE ADULT - SUBJECTIVE AND OBJECTIVE BOX
LOIDAFLOWER  64y, Male  Allergy: ACE inhibitors (Hives)  enalapril (Hives)  rifampin (Angioedema)  vancomycin (Angioedema)      CHIEF COMPLAINT: Altered mental status (2020 04:11)      HPI:  64yMale with a past medical history of HTN, DLD, CAD s/p CAB , HFrEF (25%-30% on 2020 echo) s/p BiV placement, DM on insulin pump, pulmonary hyptertension, Celiac disease, diverticulitis, s/p total R knee replacement 2019 complicated by MRSA infection s/p Abx w/PICC and R thigh skin graft, and R hip intertrochanteric fracture 3 weeks ago 2020 due to falling asleep while standing s/p ORIF in which surgical site grew Serratia marcescens, E. coli, skin tatum, presented to ED  for altered mental status. Per H&P, patient was agitated and combative with staff, scared that "someone was sneaking around his room and wanted staff to call the police".     On arrival to ED, patient was hemodynamically stable, afebrile but with leukocytosis to 12.34, Na 130, K 5.2, lactate 2.4, UA positive for WBCs, bacteria, blood, leukocyte esterase, and was initiated on meropenem due to recent antibiotic use. CT head was negative, chest x-ray was negative.     Currently patient is pleasant, does not remember being combative but remembers that at the nursing home the lights were going on and off, thinking that someone was messing with him. He called out to the two staff he thought were on duty and asked for the police to be called. He denied any fever nor chills, taking any illicit drugs, nor being diagnosed with psychiatric disorder.       Infectious Diseases History:  Old Micro Data:  Culture - Surgical Swab (20 @ 15:20)    -  Amikacin: S <=16    -  Amikacin: S <=16    -  Amoxicillin/Clavulanic Acid: R >16/8    -  Amoxicillin/Clavulanic Acid: I 16/8    -  Ertapenem: S <=0.5    -  Ertapenem: S <=0.5    -  Aztreonam: S <=4    -  Aztreonam: S <=4    -  Ciprofloxacin: S <=1    -  Ciprofloxacin: R >2    -  Ampicillin: R 16 These ampicillin results predict results for amoxicillin    -  Ampicillin: R >16 These ampicillin results predict results for amoxicillin    -  Ampicillin/Sulbactam: R >16/8 Enterobacter, Citrobacter, and Serratia may develop resistance during prolonged therapy (3-4 days)    -  Ampicillin/Sulbactam: R 16/8 Enterobacter, Citrobacter, and Serratia may develop resistance during prolonged therapy (3-4 days)    -  Cefazolin: R >16 Enterobacter, Citrobacter, and Serratia may develop resistance during prolonged therapy (3-4 days)    -  Cefazolin: R >16 Enterobacter, Citrobacter, and Serratia may develop resistance during prolonged therapy (3-4 days)    -  Cefepime: S <=2    -  Cefepime: S <=2    -  Cefoxitin: R 16    -  Cefoxitin: S <=8    -  Trimethoprim/Sulfamethoxazole: S <=2/38    -  Trimethoprim/Sulfamethoxazole: S <=2/38    -  Meropenem: S <=1    -  Meropenem: S <=1    -  Tobramycin: S 4    -  Tobramycin: S <=2    -  Piperacillin/Tazobactam: S <=8    -  Piperacillin/Tazobactam: S 16    -  Ceftriaxone: S <=1 Enterobacter, Citrobacter, and Serratia may develop resistance during prolonged therapy    -  Ceftriaxone: S <=1 Enterobacter, Citrobacter, and Serratia may develop resistance during prolonged therapy    -  Imipenem: S <=1    -  Levofloxacin: S <=2    -  Levofloxacin: R >4    -  Gentamicin: S <=2    -  Gentamicin: S <=2    Specimen Source: .Surgical Swab SOURCE: RIGHT LEG EXTREMITY    Culture Results:   Numerous Serratia marcescens  Moderate Escherichia coli  Normal skin tatum isolated    Organism Identification: Serratia marcescens  Escherichia coli    Organism: Serratia marcescens    Organism: Escherichia coli    Method Type: YOLIE    Method Type: YOLIE        FAMILY HISTORY:  Family history of allergies: daughter  Family history of heart disease (Mother)    PAST MEDICAL & SURGICAL HISTORY:  Diabetic neuropathy  STEMI (ST elevation myocardial infarction)  Diverticulitis  MRSA bacteremia  History of celiac disease  CHF (congestive heart failure): EF ~ 25%  HTN (hypertension)  Diabetes: on insulin pump  Blood clot due to device, implant, or graft: was on blood thinners  HLD (hyperlipidemia)  Osteoarthritis  Atherosclerosis of coronary artery: CAD (coronary artery disease)  Status post percutaneous transluminal coronary angioplasty: in   History of open reduction and internal fixation (ORIF) procedure  Surgery, elective: Right shoulder  Surgery, elective: right knee wound debridement  S/P TKR (total knee replacement), right: with infection Mrsa   per pt he was cleared from MRSA infection  S/P CABG x 1:   Stented coronary artery: 10/18 heart attack  INFERIOR WALL MI  Other postprocedural status: Fixation hardware in foot LEFT      ROS  General: Denies rigors, nightsweats, fevers   HEENT: Denies headache, rhinorrhea, sore throat, eye pain  CV: Denies CP, palpitations  PULM: Denies wheezing, hemoptysis  GI: Denies hematemesis, hematochezia, melena  : Denies discharge, hematuria, burning micturition   MSK: Endorses minimal arthralgia in R knee with arthroplasty   SKIN: Denies rash, lesions  NEURO: Denies paresthesias, weakness  PSYCH: Denies depression, anxiety     VITALS:  T(F): 97.4, Max: 98.6 (20 @ 22:52)  HR: 80  BP: 132/61  RR: 20Vital Signs Last 24 Hrs  T(C): 36.3 (2020 08:28), Max: 37 (2020 22:52)  T(F): 97.4 (2020 08:28), Max: 98.6 (2020 22:52)  HR: 80 (2020 08:28) (66 - 80)  BP: 132/61 (2020 08:28) (110/60 - 132/61)  BP(mean): --  RR: 20 (2020 08:28) (18 - 20)  SpO2: 100% (2020 08:28) (94% - 100%)    PHYSICAL EXAM:  Gen: NAD, resting in bed; appears to be alert to his ED history but with foggy details   HEENT: Normocephalic, atraumatic  Neck: supple, no lymphadenopathy  CV: Regular rate & regular rhythm  Lungs: CTAB  Abdomen: Soft, BS present  : Loja in place without purulence/drainage at meatus; no blood; no swelling; no CVAT  Ext: Warm, well perfused  Neuro: non focal, awake  Skin: surgical closure at R hip without erythema, purulence, calor; some dolor present with palpation; sutures intact without dehiscence. Skin graft with erythema; without purulence, . R knee appears erythematous from bottom of patella extending up to distal thigh with many scab formation without weeping, purulence, induration; dry crusty borders with surrounding hyperpigmentation.   Lines: no phlebitis      TESTS & MEASUREMENTS:                        12.3   12.34 )-----------( 309      ( 2020 23:47 )             40.4         130<L>  |  84<L>  |  50<H>  ----------------------------<  368<H>  5.2<H>   |  28  |  1.9<H>    Ca    9.8      2020 23:47  Mg     2.6         TPro  8.0  /  Alb  4.1  /  TBili  2.5<H>  /  DBili  x   /  AST  32  /  ALT  21  /  AlkPhos  261<H>      eGFR if Non African American: 37 mL/min/1.73M2 (20 @ 23:47)  eGFR if : 43 mL/min/1.73M2 (20 @ 23:47)    LIVER FUNCTIONS - ( 2020 23:47 )  Alb: 4.1 g/dL / Pro: 8.0 g/dL / ALK PHOS: 261 U/L / ALT: 21 U/L / AST: 32 U/L / GGT: x           Urinalysis Basic - ( 2020 01:20 )    Color: Light Orange / Appearance: Slightly Turbid / S.024 / pH: x  Gluc: x / Ketone: Negative  / Bili: Negative / Urobili: 3 mg/dL   Blood: x / Protein: Trace / Nitrite: Negative   Leuk Esterase: Moderate / RBC: >720 /HPF / WBC 81 /HPF   Sq Epi: x / Non Sq Epi: 0 /HPF / Bacteria: Moderate          Blood Gas Venous - Lactate: 2.8 mmoL/L (20 @ 02:16)  Lactate, Blood: 2.4 mmol/L (20 @ 23:47)      INFECTIOUS DISEASES TESTING  MRSA PCR Result.: Negative (10-14-19 @ 17:18)      RADIOLOGY & ADDITIONAL TESTS:  I have personally reviewed the last available Chest xray  CXR  Xray Chest 1 View- PORTABLE-Urgent:   EXAM:  XR CHEST PORTABLE URGENT 1V            PROCEDURE DATE:  2020            INTERPRETATION:  Clinical History / Reason for exam: Baseline    Comparison : Chest radiograph 2020.    Technique/Positioning: Frontal radiograph of the chest. Rotated exam.    Findings:    Support devices: Stable left ICD..    Cardiac/mediastinum/hilum: Cardiomegaly.    Lung parenchyma/Pleura: Low lung volumes. No consolidation. No pneumothorax.    Skeleton/soft tissues: Stable.    Impression:      No radiographic evidence of acute cardiopulmonary disease.                      GABRIEL COBURN M.D., ATTENDING RADIOLOGIST  This document has been electronically signed. 2020 10:21AM             (20 @ 01:16)      CT      CARDIOLOGY TESTING  12 Lead ECG:   Ventricular Rate 64 BPM    Atrial Rate 64 BPM    P-R Interval 162 ms    QRS Duration 166 ms    Q-T Interval 426 ms    QTC Calculation(Bezet) 439 ms    P Axis 60 degrees    R Axis -41 degrees    T Axis 64 degrees    Diagnosis Line Atrial-sensed ventricular-paced rhythm with occasional Premature ventricular  complexes  Abnormal ECG    Confirmed by SERGIO BALLARD MD ([id number]) on 2020 10:01:05 AM (20 @ 14:46)  Transthoracic Echocardiogram:    EXAM:  2-D ECHO (TTE) COMPLETE        PROCEDURE DATE:  2020      INTERPRETATION:  REPORT:    TRANSTHORACIC ECHOCARDIOGRAM REPORT         Patient Name:   FLOWER MARISCAL Accession #: [id number]  Medical Rec #:  [id number]         Height:      71.0 in 180.3 cm  YOB: 1955         Weight:      202.0 lb 91.63 kg  Patient Age:    64 years         BSA:         2.12 m²  Patient Gender: M                BP:          102/51 mmHg       Date of Exam:        2020 2:37:00 PM  Referring Physician: [id number] ED UNASSIGNED  Sonographer:         Adri marie  Reading Physician:   Hermelindo Beebe M.D.    Procedure:   2D Echo/Doppler/Color Doppler Complete.  Indications: I42.9 - Cardiomyopathy, unspecified  Diagnosis:   Cardiomyopathy, unspecified - I42.9         Summary:   1. Left ventricular ejection fraction, by visual estimation, is 25 to 30%.   2. Severely decreased segmental left ventricular systolic function.   3. Moderate to severely increased left ventricular internal cavity size.   4. Mild to moderate mitral valve regurgitation.   5. Mild-moderate tricuspid regurgitation.   6. Mild aortic regurgitation.   7. Estimated pulmonary artery systolic pressure is 51.8 mmHg assuming a right atrial pressure of 15 mmHg, which is consistent with moderate pulmonary hypertension.    PHYSICIAN INTERPRETATION:  Left Ventricle: The left ventricular internal cavity size is moderate to severely increased. Left ventricular wall thickness is normal. Left ventricular ejection fraction, by visual estimation, is 25 to 30%. Severely decreased segmental left ventricular systolic function.  Right Ventricle: Normal right ventricular size and function.  Left Atrium: Left atrial enlargement.  Right Atrium: Right atrial enlargement.  Pericardium: There is no evidence of pericardial effusion.  Mitral Valve: No evidence of mitral stenosis. Mild to moderate mitral valve regurgitation is seen.  Tricuspid Valve: Mild-moderate tricuspid regurgitation is visualized. Estimated pulmonary artery systolic pressure is 51.8 mmHg assuming a right atrial pressure of 15 mmHg, which is consistent with moderate pulmonary hypertension.  Aortic Valve: No evidence of aortic stenosis. Aortic valve thickening with normal leaflet opening. Mild aortic valve regurgitation is seen.  Pulmonic Valve: Trace pulmonic valve regurgitation.  Aorta: The aortic root is normal in size and structure.  Venous: The inferior vena cava was dilated, with respiratory size variation less than 50%, consistent with elevated right atrial pressure.  Additional Comments: A pacer wire is visualized in the right atrium and right ventricle.       2D AND M-MODE MEASUREMENTS (normal ranges within parentheses):  Left                  Normal   Aorta/Left             Normal  Ventricle:                     Atrium:  IVSd (2D):  1.01 cm  (0.7-1.1) AoV Cusp       2.24  (1.5-2.6)  LVPWd (2D): 1.03 cm  (0.7-1.1) Separation:     cm  LVIDd (2D): 5.53 cm  (3.4-5.7) Left Atrium    4.29  (1.9-4.0)  LVIDs (2D): 4.81 cm            (Mmode):        cm  LV FS (2D):  13.0 %   (>25%)   LA Volume      33.4  IVSd        0.81 cm  (0.7-1.1) Index         ml/m²  (Mmode):                       Right  LVPWd       0.89 cm  (0.7-1.1) Ventricle:  (Mmode):                       RVd (2D):      3.74 cm  LVIDd       6.68 cm  (3.4-5.7)  (Mmode):  LVIDs       5.36 cm  (Mmode):  LV FS        19.8 %   (>25%)  (Mmode):  Relative      0.37    (<0.42)  Wall  Thickness  Rel. Wall     0.27    (<0.42)  Thickness  Mm  LV Mass      114.4  Index:        g/m²  Mmode    SPECTRAL DOPPLER ANALYSIS:  LV DIASTOLIC FUNCTION:  MV Peak E: 1.14 m/s Decel Time: 139 msec  MV Peak A: 0.27 m/s  E/A Ratio: 4.18    Aortic Valve:  AoV VMax:    1.25 m/s AoV Area, Vmax: 2.47 cm² Vmax Indx: 1.17 cm²/m²  AoV Pk Grad: 6.3 mmHg    LVOT Vmax: 0.84 m/s  LVOT VTI:  0.18 m  LVOT Diam: 2.17 cm    Aortic Insufficiency:  AI Half-time:  221 msec  AI Decel Rate: 1.58 m/s²    Mitral Valve:  MV P1/2 Time: 40.33 msec  MV Area, PHT: 5.45 cm²    Tricuspid Valve and PA/RV Systolic Pressure:TR Max Velocity: 3.03 m/s RA Pressure: 15 mmHg RVSP/PASP: 51.8 mmHg    Pulmonic Valve:  PV Max Velocity: 0.89 m/s PV Max PG: 3.2 mmHg PV Mean PG:       [id number] Hermelindo Beebe M.D., Electronically signed on 2020 at 5:49:05 PM              *** Final ***                    HERMELINDO BEEBE MD  This document has been electronically signed. 2020  2:37PM             (20 @ 14:37)      All available historical records have been reviewed    MEDICATIONS  aspirin enteric coated 81  atorvastatin Oral Tab/Cap - Peds 40  dextrose 5%. 1000  dextrose 50% Injectable 12.5  dextrose 50% Injectable 25  dextrose 50% Injectable 25  DULoxetine 90  heparin  Injectable 5000  insulin glargine Injectable (LANTUS) 16  insulin lispro (HumaLOG) corrective regimen sliding scale   insulin lispro Injectable (HumaLOG) 5  meropenem  IVPB 1000  metoprolol succinate   pantoprazole    Tablet 40  prasugrel 10  sodium chloride 0.9%. 1000  tamsulosin Oral Tab/Cap - Peds 0.4      ANTIBIOTICS:  meropenem  IVPB 1000 milliGRAM(s) IV Intermittent every 8 hours      All available historical data has been reviewed LOIDAFLOWER  64y, Male  Allergy: ACE inhibitors (Hives)  enalapril (Hives)  rifampin (Angioedema)  vancomycin (Angioedema)      CHIEF COMPLAINT: Altered mental status (2020 04:11)      HPI:  64yMale with a past medical history of HTN, DLD, CAD s/p CAB , HFrEF (25%-30% on 2020 echo) s/p BiV placement, DM on insulin pump, pulmonary hyptertension, Celiac disease, diverticulitis, s/p total R knee replacement 2019 complicated by MRSA infection s/p Abx w/PICC and R thigh skin graft, and R hip intertrochanteric fracture 3 weeks ago 2020 due to falling asleep while standing s/p ORIF in which surgical site grew Serratia marcescens, E. coli, skin tatum, presented to ED  for altered mental status. Per H&P, patient was agitated and combative with staff, scared that "someone was sneaking around his room and wanted staff to call the police".     On arrival to ED, patient was hemodynamically stable, afebrile but with leukocytosis to 12.34, Na 130, K 5.2, lactate 2.4, UA positive for WBCs, bacteria, blood, leukocyte esterase, and was initiated on meropenem due to recent antibiotic use. CT head was negative, chest x-ray was negative.     Currently patient is pleasant, does not remember being combative but admits he "went crazy", remembering that at the nursing home the lights were going on and off, thinking that someone was "messing with him". He called out to the two staff he thought were on duty and asked for the police to be called. He denied any fever nor chills, taking any illicit drugs, nor being diagnosed with psychiatric disorder. Endorses suprapubic pain 6 days ago on Saturday 2/15 with urinary hesitancy, relieved by catheterization; he then failed voiding trial on catheter removal so he was re-catheterized two days ago.       Infectious Diseases History:  Old Micro Data:  Culture - Surgical Swab (20 @ 15:20)    -  Amikacin: S <=16    -  Amikacin: S <=16    -  Amoxicillin/Clavulanic Acid: R >16/8    -  Amoxicillin/Clavulanic Acid: I 16/8    -  Ertapenem: S <=0.5    -  Ertapenem: S <=0.5    -  Aztreonam: S <=4    -  Aztreonam: S <=4    -  Ciprofloxacin: S <=1    -  Ciprofloxacin: R >2    -  Ampicillin: R 16 These ampicillin results predict results for amoxicillin    -  Ampicillin: R >16 These ampicillin results predict results for amoxicillin    -  Ampicillin/Sulbactam: R >16/8 Enterobacter, Citrobacter, and Serratia may develop resistance during prolonged therapy (3-4 days)    -  Ampicillin/Sulbactam: R 16/8 Enterobacter, Citrobacter, and Serratia may develop resistance during prolonged therapy (3-4 days)    -  Cefazolin: R >16 Enterobacter, Citrobacter, and Serratia may develop resistance during prolonged therapy (3-4 days)    -  Cefazolin: R >16 Enterobacter, Citrobacter, and Serratia may develop resistance during prolonged therapy (3-4 days)    -  Cefepime: S <=2    -  Cefepime: S <=2    -  Cefoxitin: R 16    -  Cefoxitin: S <=8    -  Trimethoprim/Sulfamethoxazole: S <=2/38    -  Trimethoprim/Sulfamethoxazole: S <=2/38    -  Meropenem: S <=1    -  Meropenem: S <=1    -  Tobramycin: S 4    -  Tobramycin: S <=2    -  Piperacillin/Tazobactam: S <=8    -  Piperacillin/Tazobactam: S 16    -  Ceftriaxone: S <=1 Enterobacter, Citrobacter, and Serratia may develop resistance during prolonged therapy    -  Ceftriaxone: S <=1 Enterobacter, Citrobacter, and Serratia may develop resistance during prolonged therapy    -  Imipenem: S <=1    -  Levofloxacin: S <=2    -  Levofloxacin: R >4    -  Gentamicin: S <=2    -  Gentamicin: S <=2    Specimen Source: .Surgical Swab SOURCE: RIGHT LEG EXTREMITY    Culture Results:   Numerous Serratia marcescens  Moderate Escherichia coli  Normal skin tatum isolated    Organism Identification: Serratia marcescens  Escherichia coli    Organism: Serratia marcescens    Organism: Escherichia coli    Method Type: YOLIE    Method Type: YOLIE        FAMILY HISTORY:  Family history of allergies: daughter  Family history of heart disease (Mother)    PAST MEDICAL & SURGICAL HISTORY:  Diabetic neuropathy  STEMI (ST elevation myocardial infarction)  Diverticulitis  MRSA bacteremia  History of celiac disease  CHF (congestive heart failure): EF ~ 25%  HTN (hypertension)  Diabetes: on insulin pump  Blood clot due to device, implant, or graft: was on blood thinners  HLD (hyperlipidemia)  Osteoarthritis  Atherosclerosis of coronary artery: CAD (coronary artery disease)  Status post percutaneous transluminal coronary angioplasty: in   History of open reduction and internal fixation (ORIF) procedure  Surgery, elective: Right shoulder  Surgery, elective: right knee wound debridement  S/P TKR (total knee replacement), right: with infection Mrsa   per pt he was cleared from MRSA infection  S/P CABG x 1:   Stented coronary artery: 10/18 heart attack  INFERIOR WALL MI  Other postprocedural status: Fixation hardware in foot LEFT      ROS  General: Denies rigors, nightsweats, fevers   HEENT: Denies headache, rhinorrhea, sore throat, eye pain  CV: Denies CP, palpitations  PULM: Denies wheezing, hemoptysis  GI: Denies hematemesis, hematochezia, melena  : Denies discharge, hematuria, burning micturition. Endorses suprapubic pain.   MSK: Endorses minimal arthralgia in R knee with arthroplasty   SKIN: Denies rash, lesions  NEURO: Denies paresthesias, weakness  PSYCH: Denies depression, anxiety     VITALS:  T(F): 97.4, Max: 98.6 (20 @ 22:52)  HR: 80  BP: 132/61  RR: 20Vital Signs Last 24 Hrs  T(C): 36.3 (2020 08:28), Max: 37 (2020 22:52)  T(F): 97.4 (2020 08:28), Max: 98.6 (2020 22:52)  HR: 80 (2020 08:28) (66 - 80)  BP: 132/61 (2020 08:28) (110/60 - 132/61)  BP(mean): --  RR: 20 (2020 08:28) (18 - 20)  SpO2: 100% (2020 08:28) (94% - 100%)    PHYSICAL EXAM:  Gen: NAD, resting in bed; appears to be alert to his ED history but with foggy details   HEENT: Normocephalic, atraumatic  Neck: supple, no lymphadenopathy  CV: Regular rate & regular rhythm  Lungs: CTAB  Abdomen: Soft, BS present  : Loja in place without purulence/drainage at meatus; no blood; no swelling; no CVAT  Ext: Warm, well perfused  Neuro: non focal, awake  Skin: surgical closure at R hip without erythema, purulence, calor; some dolor present with palpation; sutures intact without dehiscence. Skin graft with erythema; without purulence, . R knee appears erythematous from bottom of patella extending up to distal thigh with many scab formation without weeping, purulence, induration; dry crusty borders with surrounding hyperpigmentation.   Lines: no phlebitis      TESTS & MEASUREMENTS:                        12.3   12.34 )-----------( 309      ( 2020 23:47 )             40.4         130<L>  |  84<L>  |  50<H>  ----------------------------<  368<H>  5.2<H>   |  28  |  1.9<H>    Ca    9.8      2020 23:47  Mg     2.6         TPro  8.0  /  Alb  4.1  /  TBili  2.5<H>  /  DBili  x   /  AST  32  /  ALT  21  /  AlkPhos  261<H>      eGFR if Non African American: 37 mL/min/1.73M2 (20 @ 23:47)  eGFR if : 43 mL/min/1.73M2 (20 @ 23:47)    LIVER FUNCTIONS - ( 2020 23:47 )  Alb: 4.1 g/dL / Pro: 8.0 g/dL / ALK PHOS: 261 U/L / ALT: 21 U/L / AST: 32 U/L / GGT: x           Urinalysis Basic - ( 2020 01:20 )    Color: Light Orange / Appearance: Slightly Turbid / S.024 / pH: x  Gluc: x / Ketone: Negative  / Bili: Negative / Urobili: 3 mg/dL   Blood: x / Protein: Trace / Nitrite: Negative   Leuk Esterase: Moderate / RBC: >720 /HPF / WBC 81 /HPF   Sq Epi: x / Non Sq Epi: 0 /HPF / Bacteria: Moderate          Blood Gas Venous - Lactate: 2.8 mmoL/L (20 @ 02:16)  Lactate, Blood: 2.4 mmol/L (20 @ 23:47)      INFECTIOUS DISEASES TESTING  MRSA PCR Result.: Negative (10-14-19 @ 17:18)      RADIOLOGY & ADDITIONAL TESTS:  I have personally reviewed the last available Chest xray  CXR  Xray Chest 1 View- PORTABLE-Urgent:   EXAM:  XR CHEST PORTABLE URGENT 1V            PROCEDURE DATE:  2020            INTERPRETATION:  Clinical History / Reason for exam: Baseline    Comparison : Chest radiograph 2020.    Technique/Positioning: Frontal radiograph of the chest. Rotated exam.    Findings:    Support devices: Stable left ICD..    Cardiac/mediastinum/hilum: Cardiomegaly.    Lung parenchyma/Pleura: Low lung volumes. No consolidation. No pneumothorax.    Skeleton/soft tissues: Stable.    Impression:      No radiographic evidence of acute cardiopulmonary disease.                      GABRIEL COBURN M.D., ATTENDING RADIOLOGIST  This document has been electronically signed. 2020 10:21AM             (20 @ 01:16)      CT      CARDIOLOGY TESTING  12 Lead ECG:   Ventricular Rate 64 BPM    Atrial Rate 64 BPM    P-R Interval 162 ms    QRS Duration 166 ms    Q-T Interval 426 ms    QTC Calculation(Bezet) 439 ms    P Axis 60 degrees    R Axis -41 degrees    T Axis 64 degrees    Diagnosis Line Atrial-sensed ventricular-paced rhythm with occasional Premature ventricular  complexes  Abnormal ECG    Confirmed by SERGIO BALLARD MD ([id number]) on 2020 10:01:05 AM (20 @ 14:46)  Transthoracic Echocardiogram:    EXAM:  2-D ECHO (TTE) COMPLETE        PROCEDURE DATE:  2020      INTERPRETATION:  REPORT:    TRANSTHORACIC ECHOCARDIOGRAM REPORT         Patient Name:   FLOWER MARISCAL Accession #: [id number]  Medical Rec #:  [id number]         Height:      71.0 in 180.3 cm  YOB: 1955         Weight:      202.0 lb 91.63 kg  Patient Age:    64 years         BSA:         2.12 m²  Patient Gender: M                BP:          102/51 mmHg       Date of Exam:        2020 2:37:00 PM  Referring Physician: [id number] ED UNASSIGNED  Sonographer:         Adri marie  Reading Physician:   Hermelindo Beebe M.D.    Procedure:   2D Echo/Doppler/Color Doppler Complete.  Indications: I42.9 - Cardiomyopathy, unspecified  Diagnosis:   Cardiomyopathy, unspecified - I42.9         Summary:   1. Left ventricular ejection fraction, by visual estimation, is 25 to 30%.   2. Severely decreased segmental left ventricular systolic function.   3. Moderate to severely increased left ventricular internal cavity size.   4. Mild to moderate mitral valve regurgitation.   5. Mild-moderate tricuspid regurgitation.   6. Mild aortic regurgitation.   7. Estimated pulmonary artery systolic pressure is 51.8 mmHg assuming a right atrial pressure of 15 mmHg, which is consistent with moderate pulmonary hypertension.    PHYSICIAN INTERPRETATION:  Left Ventricle: The left ventricular internal cavity size is moderate to severely increased. Left ventricular wall thickness is normal. Left ventricular ejection fraction, by visual estimation, is 25 to 30%. Severely decreased segmental left ventricular systolic function.  Right Ventricle: Normal right ventricular size and function.  Left Atrium: Left atrial enlargement.  Right Atrium: Right atrial enlargement.  Pericardium: There is no evidence of pericardial effusion.  Mitral Valve: No evidence of mitral stenosis. Mild to moderate mitral valve regurgitation is seen.  Tricuspid Valve: Mild-moderate tricuspid regurgitation is visualized. Estimated pulmonary artery systolic pressure is 51.8 mmHg assuming a right atrial pressure of 15 mmHg, which is consistent with moderate pulmonary hypertension.  Aortic Valve: No evidence of aortic stenosis. Aortic valve thickening with normal leaflet opening. Mild aortic valve regurgitation is seen.  Pulmonic Valve: Trace pulmonic valve regurgitation.  Aorta: The aortic root is normal in size and structure.  Venous: The inferior vena cava was dilated, with respiratory size variation less than 50%, consistent with elevated right atrial pressure.  Additional Comments: A pacer wire is visualized in the right atrium and right ventricle.       2D AND M-MODE MEASUREMENTS (normal ranges within parentheses):  Left                  Normal   Aorta/Left             Normal  Ventricle:                     Atrium:  IVSd (2D):  1.01 cm  (0.7-1.1) AoV Cusp       2.24  (1.5-2.6)  LVPWd (2D): 1.03 cm  (0.7-1.1) Separation:     cm  LVIDd (2D): 5.53 cm  (3.4-5.7) Left Atrium    4.29  (1.9-4.0)  LVIDs (2D): 4.81 cm            (Mmode):        cm  LV FS (2D):  13.0 %   (>25%)   LA Volume      33.4  IVSd        0.81 cm  (0.7-1.1) Index         ml/m²  (Mmode):                       Right  LVPWd       0.89 cm  (0.7-1.1) Ventricle:  (Mmode):                       RVd (2D):      3.74 cm  LVIDd       6.68 cm  (3.4-5.7)  (Mmode):  LVIDs       5.36 cm  (Mmode):  LV FS        19.8 %   (>25%)  (Mmode):  Relative      0.37    (<0.42)  Wall  Thickness  Rel. Wall     0.27    (<0.42)  Thickness  Mm  LV Mass      114.4  Index:        g/m²  Mmode    SPECTRAL DOPPLER ANALYSIS:  LV DIASTOLIC FUNCTION:  MV Peak E: 1.14 m/s Decel Time: 139 msec  MV Peak A: 0.27 m/s  E/A Ratio: 4.18    Aortic Valve:  AoV VMax:    1.25 m/s AoV Area, Vmax: 2.47 cm² Vmax Indx: 1.17 cm²/m²  AoV Pk Grad: 6.3 mmHg    LVOT Vmax: 0.84 m/s  LVOT VTI:  0.18 m  LVOT Diam: 2.17 cm    Aortic Insufficiency:  AI Half-time:  221 msec  AI Decel Rate: 1.58 m/s²    Mitral Valve:  MV P1/2 Time: 40.33 msec  MV Area, PHT: 5.45 cm²    Tricuspid Valve and PA/RV Systolic Pressure:TR Max Velocity: 3.03 m/s RA Pressure: 15 mmHg RVSP/PASP: 51.8 mmHg    Pulmonic Valve:  PV Max Velocity: 0.89 m/s PV Max PG: 3.2 mmHg PV Mean PG:       [id number] Hermelindo Beebe M.D., Electronically signed on 2020 at 5:49:05 PM              *** Final ***                    HERMELINDO BEEBE MD  This document has been electronically signed. 2020  2:37PM             (20 @ 14:37)      All available historical records have been reviewed    MEDICATIONS  aspirin enteric coated 81  atorvastatin Oral Tab/Cap - Peds 40  dextrose 5%. 1000  dextrose 50% Injectable 12.5  dextrose 50% Injectable 25  dextrose 50% Injectable 25  DULoxetine 90  heparin  Injectable 5000  insulin glargine Injectable (LANTUS) 16  insulin lispro (HumaLOG) corrective regimen sliding scale   insulin lispro Injectable (HumaLOG) 5  meropenem  IVPB 1000  metoprolol succinate   pantoprazole    Tablet 40  prasugrel 10  sodium chloride 0.9%. 1000  tamsulosin Oral Tab/Cap - Peds 0.4      ANTIBIOTICS:  meropenem  IVPB 1000 milliGRAM(s) IV Intermittent every 8 hours      All available historical data has been reviewed

## 2020-02-21 NOTE — ED PROVIDER NOTE - PSH
Other postprocedural status  Fixation hardware in foot LEFT  S/P CABG x 1  2018  S/P TKR (total knee replacement), right  with infection Mrsa   per pt he was cleared from MRSA infection  Stented coronary artery  10/18 heart attack  INFERIOR WALL MI  Surgery, elective  Right shoulder  Surgery, elective  right knee wound debridement

## 2020-02-21 NOTE — H&P ADULT - ATTENDING COMMENTS
65yo M with PMHx HTN, DLD, CAD s/p CABG, HFrEF (25-30% on ECHO 2/2020, 20-25% on MUGA scan in 12/2019) s/p st carlyle BiV placement 2/2020, DM (on insulin pump), pulmonary hypertension, Celiac disease, diverticulitis, s/p right total knee replacement October 2019 complicated by infection s/p abx course w/ PICC and  right thigh with skin graft, right hip fracture s/p ORIF 2/2020. pt was seen and examined this AM. he recalls having visual hallucination yesterday at the NH and denies any further episodes since arrival to ER. 65yo M with PMHx HTN, DLD, CAD s/p CABG, HFrEF (25-30% on ECHO 2/2020, 20-25% on MUGA scan in 12/2019) s/p st carlyle BiV placement 2/2020, DM (on insulin pump), pulmonary hypertension, Celiac disease, diverticulitis, s/p right total knee replacement October 2019 complicated by infection s/p abx course w/ PICC and  right thigh with skin graft, right hip fracture s/p ORIF 2/2020. pt was seen and examined this AM. he recalls having visual hallucination yesterday at the NH and denies any further episodes since arrival to ER. denies fevers, chills, CP, sob, palpitations, abdominal pain, n/v/c/d.  Vital Signs Last 24 Hrs  T(C): 36.3 (21 Feb 2020 08:28), Max: 37 (20 Feb 2020 22:52)  T(F): 97.4 (21 Feb 2020 08:28), Max: 98.6 (20 Feb 2020 22:52)  HR: 76 (21 Feb 2020 11:03) (66 - 80)  BP: 126/60 (21 Feb 2020 11:03) (110/60 - 132/61)  RR: 20 (21 Feb 2020 08:28) (18 - 20)  SpO2: 100% (21 Feb 2020 08:28) (94% - 100%)    physical exam:  gen: thin elderly male in no acute distress A&Ox3  HEENT: EOMI normocephalic atraumatic   Resp: decreased breath sounds bilateral bases   CV: RRR no MRGs appreciated   abd: ntnd positive bowel sounds  : fields in place draining yellow clear urine  neuro: A&Ox3 no focal deficits     labs/imaging reviewed in sunrise    A/P:    #metabolic encephalopathy likely in the setting of UTI vs. hyponatremia (chronic) vs. underlying psychiatric illness  A&Ox3 at this time  -cont antibiotics   -monitor mental status   -trend BMP daily   -ivf hydration gentle in the setting of HFrEF  -likely will need outpt psych eval   -f/u ID and urine culture  -Mild left hydronephrosis without any obvious stones-->will obtain a CT for further evaluation.    #Heart failure with reduced ejection fraction - s/p AICD today 2/11/2020   - Euvolemic on exam   - Echo 12/13/2019: EF 11%, severely decreased global LV systolic function, grade III diastolic dysfunction, severe pulmonary hypertension  - MUGA scan with EF of 20-25%   - resume digoxin 0.125mg PO QD  - Continue with metoprolol succinate 150mg PO QD  - holding Lasix 40 and metolazone 2.5mg PO QD due to MOISES     #remainder of plan as noted in residents note. I have edited where appropriate to reflect plan from today 2/21    Progress Note Handoff  Pending:  CT abd/pelvis, ID, iv antibiotics, safe dispo planning   Patient/Family discussion: spoke with pt and wife at bedside. pt able to recall all events leading to admission but states he does not wish to return to [facility] and therefore wishes to speak with SW regarding alternative placement   Disposition: SNF 63yo M with PMHx HTN, DLD, CAD s/p CABG, HFrEF (25-30% on ECHO 2/2020, 20-25% on MUGA scan in 12/2019) s/p st carlyle BiV placement 2/2020, DM (on insulin pump), pulmonary hypertension, Celiac disease, diverticulitis, s/p right total knee replacement October 2019 complicated by infection s/p abx course w/ PICC and  right thigh with skin graft, right hip fracture s/p ORIF 2/2020. pt was seen and examined this AM. he recalls having visual hallucination yesterday at the NH and denies any further episodes since arrival to ER. denies fevers, chills, CP, sob, palpitations, abdominal pain, n/v/c/d.  Vital Signs Last 24 Hrs  T(C): 36.3 (21 Feb 2020 08:28), Max: 37 (20 Feb 2020 22:52)  T(F): 97.4 (21 Feb 2020 08:28), Max: 98.6 (20 Feb 2020 22:52)  HR: 76 (21 Feb 2020 11:03) (66 - 80)  BP: 126/60 (21 Feb 2020 11:03) (110/60 - 132/61)  RR: 20 (21 Feb 2020 08:28) (18 - 20)  SpO2: 100% (21 Feb 2020 08:28) (94% - 100%)    physical exam:  gen: thin elderly male in no acute distress A&Ox3  HEENT: EOMI normocephalic atraumatic   Resp: decreased breath sounds bilateral bases   CV: RRR no MRGs appreciated   abd: ntnd positive bowel sounds  : fields in place draining yellow clear urine  neuro: A&Ox3 no focal deficits     labs/imaging reviewed in sunrise    A/P:    #metabolic encephalopathy likely in the setting of UTI vs. hyponatremia (chronic) vs. underlying psychiatric illness  A&Ox3 at this time  -cont antibiotics   -monitor mental status   -trend BMP daily   -ivf hydration gentle in the setting of HFrEF  -likely will need outpt psych eval   -f/u ID and urine culture  -Mild left hydronephrosis without any obvious stones  -will consider a CT for further evaluation however given current MOISES will refrain from contrast study re-eval tomorrow     #Heart failure with reduced ejection fraction - s/p AICD today 2/11/2020   - Euvolemic on exam   - Echo 12/13/2019: EF 11%, severely decreased global LV systolic function, grade III diastolic dysfunction, severe pulmonary hypertension  - MUGA scan with EF of 20-25%   - resume digoxin 0.125mg PO QD  - Continue with metoprolol succinate 150mg PO QD  - holding Lasix 40 and metolazone 2.5mg PO QD due to MOISES     #remainder of plan as noted in residents note. I have edited where appropriate to reflect plan from today 2/21    Progress Note Handoff  Pending:  CT abd/pelvis, ID, iv antibiotics, safe dispo planning   Patient/Family discussion: spoke with pt and wife at bedside. pt able to recall all events leading to admission but states he does not wish to return to [facility] and therefore wishes to speak with SW regarding alternative placement   Disposition: SNF

## 2020-02-21 NOTE — ED ADULT NURSE NOTE - CHIEF COMPLAINT QUOTE
BIBA from Nursing home for AMS, as per EMS pt was aggressive towards staff at nursing home. Pt is AxOx3 in triage, Denies any complaints. Pt cooperative in triage.

## 2020-02-21 NOTE — ED PROVIDER NOTE - PHYSICAL EXAMINATION
CONSTITUTIONAL: chronic ill male; in no apparent distress.   EYES: PERRL; EOM intact.   ENT: normal nose; no rhinorrhea; normal pharynx with no tonsillar hypertrophy.   NECK: Supple; non-tender; no cervical lymphadenopathy. No JVD.   CARDIOVASCULAR: Normal S1, S2; no murmurs, rubs, or gallops.   RESPIRATORY: Normal chest excursion with respiration; breath sounds clear and equal bilaterally; no wheezes, rhonchi, or rales.  GI/: Normal bowel sounds; non-distended; non-tender; no palpable organomegaly.   MS: painful ROM to right hip 2/2 recent hip surgery. 2+ DP pulses b/l LE.   SKIN: healing skin graft to right thigh and knee.   NEURO/PSYCH: A & O x 3; grossly unremarkable.

## 2020-02-21 NOTE — H&P ADULT - ASSESSMENT
================= ASSESSMENT/PLAN ==================  Patient is a 64y old Male who presents with a chief complaint of Altered mental status (21 Feb 2020 04:11)  Currently admitted to medicine with the primary diagnosis of Metabolic encephalopathy    # Metabolic encephalopathy likely secondary to urinary tract infection   Sepsis ruled out on admission   Unclear if UA real as per vocal sign out and per the patient the fields was changed but per notes fields was not changed   - Will treat with laquita for now as patient was recently on antibiotics   - Follow up urine culture     # MOISES on CKD   Likely pre renal vs obstructive   - Check urine studies   - Check kidney bladder US   - Gentle IVF and repeat BMP in PM   - Monitor closely for volume overload     #Heart failure with reduced ejection fraction - s/p AICD today 2/11/2020 - Euvolemic on exam   - Echo 12/13/2019: EF 11%, severely decreased global LV systolic function, grade III diastolic dysfunction, severe pulmonary hypertension  - MUGA scan with EF of 20-25%   - Holding digoxin 0.125mg PO QD for now given hyperkalemia (check levels)   - Continue with metoprolol succinate 150mg PO QD  - Holding Lasix 40 and metolazone 2.5mg PO QD due to MOISES     # CAD s/p CABG and PCI, stable  - Continue with aspirin 81mg PO QD  - Continue with prasugrel 10mg PO QD  - Continue with atorvastatin 40mg PO QD  - Continue with metoprolol succinate 150mg PO QD    # Celiac disease  - Patient is asymptomatic at this time  - Continue with gluten-restricted diet  - Follow outpatient with gastroenterology    # Diabetes mellitus type 2 with diabetic neuropathy  - Patient removed his insulin pump overnight  - Monitor fingerstick glucose  - If fingerstick glucose >180, will start basal-bolus insulin     # Hypertension - Controlled   - Continue home meds     # Anxiety  - Continue with duloxetine 90mg PO QD  - Continue with alprazolam 0.25mg PO QD    #Misc  - DVT Prophylaxis: Heparin SQ   - Diet: DASH/TLC, gluten restricted  - GI Prophylaxis: pantoprazole 40mg PO QD   - Activity: increase as tolerated  - Code Status: Full Code ================= ASSESSMENT/PLAN ==================  Patient is a 64y old Male who presents with a chief complaint of Altered mental status (21 Feb 2020 04:11)  Currently admitted to medicine with the primary diagnosis of Metabolic encephalopathy    # Metabolic encephalopathy likely secondary to urinary tract infection   Sepsis ruled out on admission   Also can be due to hyponatremia vs psychiatric illness   Unclear if UA real as per vocal sign out and per the patient the fields was changed but per notes fields was not changed   - Will treat with laquita for now as patient was recently on antibiotics (Clearance 51)  - Follow up urine culture   - ID eval     # MOISES   # Hyponatremia - Chronic ? Unclear etiology   # Hyperkalemia   Likely pre renal vs obstructive   - Check urine studies   - Check kidney bladder US   - Gentle IVF and repeat BMP in PM   - Monitor closely for volume overload     #Heart failure with reduced ejection fraction - s/p AICD today 2/11/2020 - Euvolemic on exam   - Echo 12/13/2019: EF 11%, severely decreased global LV systolic function, grade III diastolic dysfunction, severe pulmonary hypertension  - MUGA scan with EF of 20-25%   - Holding digoxin 0.125mg PO QD for now given hyperkalemia (check levels)   - Continue with metoprolol succinate 150mg PO QD  - Holding Lasix 40 and metolazone 2.5mg PO QD due to MOISES     # CAD s/p CABG and PCI, stable  - Continue with aspirin 81mg PO QD  - Continue with prasugrel 10mg PO QD  - Continue with atorvastatin 40mg PO QD  - Continue with metoprolol succinate 150mg PO QD    # Celiac disease  - Patient is asymptomatic at this time  - Continue with gluten-restricted diet  - Follow outpatient with gastroenterology    # Diabetes mellitus type 2 with diabetic neuropathy  - Patient removed his insulin pump during last hospitalization   - Monitor fingerstick glucose  - If fingerstick glucose >180, will start basal-bolus insulin     # Hypertension - Controlled   - Continue home meds     # Anxiety  - Continue with duloxetine 90mg PO QD (Clearance > 30. If < 30 would need to avoid)   - Continue with alprazolam 0.25mg PO QD    #Misc  - DVT Prophylaxis: Heparin SQ   - Diet: DASH/TLC, gluten restricted  - GI Prophylaxis: pantoprazole 40mg PO QD   - Activity: increase as tolerated  - Code Status: Full Code ================= ASSESSMENT/PLAN ==================  Patient is a 64y old Male who presents with a chief complaint of Altered mental status (21 Feb 2020 04:11)  Currently admitted to medicine with the primary diagnosis of Metabolic encephalopathy    # Metabolic encephalopathy likely secondary to urinary tract infection   Sepsis ruled out on admission   Also can be due to hyponatremia vs psychiatric illness   Unclear if UA real as per vocal sign out and per the patient the fields was changed but per notes fields was not changed   - Will treat with laquita for now as patient was recently on antibiotics (Clearance 51)  - Follow up urine culture   - ID eval     # MOISES   # Hyponatremia - Chronic ? Unclear etiology   # Hyperkalemia   Likely pre renal vs obstructive   - Check urine studies   - Check kidney bladder US   - Gentle IVF and repeat BMP in PM   - Monitor closely for volume overload     #Heart failure with reduced ejection fraction - s/p AICD today 2/11/2020 - Euvolemic on exam   - Echo 12/13/2019: EF 11%, severely decreased global LV systolic function, grade III diastolic dysfunction, severe pulmonary hypertension  - MUGA scan with EF of 20-25%   - Holding digoxin 0.125mg PO QD for now given hyperkalemia (check levels)   - Continue with metoprolol succinate 150mg PO QD  - Holding Lasix 40 and metolazone 2.5mg PO QD due to MOISES     # CAD s/p CABG and PCI, stable  # Elevated troponins - Likely due to MOISES. Unlikely ACS. No Angina   - Continue with aspirin 81mg PO QD  - Continue with prasugrel 10mg PO QD  - Continue with atorvastatin 40mg PO QD  - Continue with metoprolol succinate 150mg PO QD  - Repeat cardiac enzymes     # Microcytic anemia - stable   - check iron studies     # Celiac disease  - Patient is asymptomatic at this time  - Continue with gluten-restricted diet  - Follow outpatient with gastroenterology    # Diabetes mellitus type 2 with diabetic neuropathy  - Patient removed his insulin pump during last hospitalization   - Monitor fingerstick glucose  - If fingerstick glucose >180, will start basal-bolus insulin     # Hypertension - Controlled   - Continue home meds     # Anxiety  - Continue with duloxetine 90mg PO QD (Clearance > 30. If < 30 would need to avoid)   - Continue with alprazolam 0.25mg PO QD    #Misc  - DVT Prophylaxis: Heparin SQ   - Diet: DASH/TLC, gluten restricted  - GI Prophylaxis: pantoprazole 40mg PO QD   - Activity: increase as tolerated  - Code Status: Full Code ================= ASSESSMENT/PLAN ==================  Patient is a 64y old Male who presents with a chief complaint of Altered mental status (21 Feb 2020 04:11)  Currently admitted to medicine with the primary diagnosis of Metabolic encephalopathy    # Metabolic encephalopathy likely secondary to urinary tract infection   Sepsis ruled out on admission   Also can be due to hyponatremia vs psychiatric illness   Unclear if UA real as per vocal sign out and per the patient the fields was changed but per notes fields was not changed   - Will treat with laquita for now as patient was recently on antibiotics (Clearance 51)  - Follow up urine culture   - ID eval     # MOISES with HAGMA  # Hyponatremia - Chronic ? Unclear etiology   # Hyperkalemia   Likely pre renal vs obstructive   - Check urine studies   - Check kidney bladder US   - Gentle IVF and repeat BMP in PM   - Monitor closely for volume overload   - Trend lactate     #Heart failure with reduced ejection fraction - s/p AICD today 2/11/2020 - Euvolemic on exam   - Echo 12/13/2019: EF 11%, severely decreased global LV systolic function, grade III diastolic dysfunction, severe pulmonary hypertension  - MUGA scan with EF of 20-25%   - Holding digoxin 0.125mg PO QD for now given hyperkalemia (check levels)   - Continue with metoprolol succinate 150mg PO QD  - Holding Lasix 40 and metolazone 2.5mg PO QD due to MOISES     # CAD s/p CABG and PCI, stable  # Elevated troponins - Likely due to MOISES. Unlikely ACS. No Angina   - Continue with aspirin 81mg PO QD  - Continue with prasugrel 10mg PO QD  - Continue with atorvastatin 40mg PO QD  - Continue with metoprolol succinate 150mg PO QD  - Repeat cardiac enzymes     # Microcytic anemia - stable   - check iron studies     # Celiac disease  - Patient is asymptomatic at this time  - Continue with gluten-restricted diet  - Follow outpatient with gastroenterology    # Diabetes mellitus type 2 with diabetic neuropathy  - Patient removed his insulin pump during last hospitalization   - Monitor fingerstick glucose  - If fingerstick glucose >180, will start basal-bolus insulin     # Hypertension - Controlled   - Continue home meds     # Anxiety  - Continue with duloxetine 90mg PO QD (Clearance > 30. If < 30 would need to avoid)   - Continue with alprazolam 0.25mg PO QD    #Misc  - DVT Prophylaxis: Heparin SQ   - Diet: DASH/TLC, gluten restricted  - GI Prophylaxis: pantoprazole 40mg PO QD   - Activity: increase as tolerated  - Code Status: Full Code ================= ASSESSMENT/PLAN ==================  Patient is a 64y old Male who presents with a chief complaint of Altered mental status (21 Feb 2020 04:11)  Currently admitted to medicine with the primary diagnosis of Metabolic encephalopathy    # Metabolic encephalopathy likely secondary to urinary tract infection   Sepsis ruled out on admission   Also can be due to hyponatremia vs psychiatric illness   Unclear if UA real as per vocal sign out and per the patient the fields was changed but per notes fields was not changed   - Will treat with laquita for now as patient was recently on antibiotics (Clearance 51)  - Follow up urine culture   - ID eval     # MOISES with HAGMA  # Hyponatremia - Chronic ? Unclear etiology   # Hyperkalemia   Likely pre renal vs obstructive   - Check urine studies   - Check kidney bladder US   - Gentle IVF and repeat BMP in PM   - Monitor closely for volume overload   - Trend lactate     #Heart failure with reduced ejection fraction - s/p AICD today 2/11/2020 - Euvolemic on exam   - Echo 12/13/2019: EF 11%, severely decreased global LV systolic function, grade III diastolic dysfunction, severe pulmonary hypertension  - MUGA scan with EF of 20-25%   - will resume digoxin 0.125mg PO QD   - Continue with metoprolol succinate 150mg PO QD  - Holding Lasix 40 and metolazone 2.5mg PO QD due to MOISES     # CAD s/p CABG and PCI, stable  # Elevated troponins - Likely due to MOISES. Unlikely ACS. No Angina   - Continue with aspirin 81mg PO QD  - Continue with prasugrel 10mg PO QD  - Continue with atorvastatin 40mg PO QD  - Continue with metoprolol succinate 150mg PO QD  - Repeat cardiac enzymes     # Microcytic anemia - stable   - check iron studies     # Celiac disease  - Patient is asymptomatic at this time  - Continue with gluten-restricted diet  - Follow outpatient with gastroenterology    # Diabetes mellitus type 2 with diabetic neuropathy  - Patient removed his insulin pump during last hospitalization   - Monitor fingerstick glucose  - If fingerstick glucose >180, will start basal-bolus insulin     # Hypertension - Controlled   - Continue home meds     # Anxiety  - Continue with duloxetine 90mg PO QD (Clearance > 30. If < 30 would need to avoid)   - Continue with alprazolam 0.25mg PO QD    #Misc  - DVT Prophylaxis: Heparin SQ   - Diet: DASH/TLC, gluten restricted  - GI Prophylaxis: pantoprazole 40mg PO QD   - Activity: increase as tolerated  - Code Status: Full Code

## 2020-02-21 NOTE — ED PROVIDER NOTE - FAMILY HISTORY
Family history of allergies, daughter     Mother  Still living? No  Family history of heart disease, Age at diagnosis: Age Unknown

## 2020-02-21 NOTE — ED PROVIDER NOTE - CARE PLAN
Principal Discharge DX:	Metabolic encephalopathy  Secondary Diagnosis:	UTI (urinary tract infection)  Secondary Diagnosis:	Sepsis  Secondary Diagnosis:	MOISES (acute kidney injury)

## 2020-02-21 NOTE — ED ADULT NURSE REASSESSMENT NOTE - NS ED NURSE REASSESS COMMENT FT1
Patient c/o right ankle pain, covering physician notified (ex 3001), one time dose tylenol will be ordered.

## 2020-02-21 NOTE — ED PROVIDER NOTE - PROGRESS NOTE DETAILS
Patient refused to have Loja catheter to be changed. UA was obtained directly from the catheter, not from Loja bag.

## 2020-02-21 NOTE — H&P ADULT - NSICDXPASTSURGICALHX_GEN_ALL_CORE_FT
PAST SURGICAL HISTORY:  History of open reduction and internal fixation (ORIF) procedure     Other postprocedural status Fixation hardware in foot LEFT    S/P CABG x 1 2018    S/P TKR (total knee replacement), right with infection Mrsa   per pt he was cleared from MRSA infection    Stented coronary artery 10/18 heart attack  INFERIOR WALL MI    Surgery, elective right knee wound debridement    Surgery, elective Right shoulder

## 2020-02-21 NOTE — CONSULT NOTE ADULT - ASSESSMENT
ASSESSMENT  64yMale with a PMH of HTN, DLD, CAD s/p CAB 2018, HFrEF (25%-30% on 2/2020 echo) s/p BiV placement, DM on insulin pump, pulmonary hyptertension, Celiac disease, diverticulitis, s/p total R knee replacement October 2019 complicated by MRSA infection s/p Abx w/PICC and R thigh skin graft, and R hip intertrochanteric fracture 3 weeks ago 1/31/2020 due to falling asleep while standing s/p ORIF in which surgical site grew Serratia marcescens, E. coli, skin tatum, presented to ED 2/20 for altered mental status.     IMPRESSION  # Metabolic encephalopathy, secondary to septic vs hyponatremic vs uremic encephalopathy vs psychiatric  - Patient appears to have experienced sensory misperception, disorientation, with return to baseline(?)  - leukocytosis to 12.34. Was 12.77 when he was discharged on 2/14/2020 after ORIF for R hip intertrochanteric fracture   - did not meet sepsis criteria   - no fever, but elderly patients with UTI may present with metabolic encephalopathy without fever    - some confusion to as to whether UA is from chronic Loja or not.     #      RECOMMENDATIONS  - f/u blood culture   -     This is a pended note. All final recommendations to follow pending discussion with ID Attending ASSESSMENT  64yMale with a PMH of HTN, DLD, CAD s/p CAB 2018, HFrEF (25%-30% on 2/2020 echo) s/p BiV placement, DM on insulin pump, pulmonary hyptertension, Celiac disease, diverticulitis, s/p total R knee replacement October 2019 complicated by MRSA infection s/p Abx w/PICC and R thigh skin graft, and R hip intertrochanteric fracture 3 weeks ago 1/31/2020 due to falling asleep while standing s/p ORIF in which surgical site grew Serratia marcescens, E. coli, skin tatum, presented to ED 2/20 for altered mental status. Patient appears to have experienced sensory misperception, disorientation, with return to baseline.     IMPRESSION  # Metabolic encephalopathy, secondary to MOISES secondary to recent Bactrim use; differential includes septic vs hyponatremic encephalopathy vs psychiatric d/o, delirium   - trimethoprim may cause decreased potassium excretion causing hyperkalemia when administered to patients with underlying disorders of potassium metabolism, with renal insufficiency or when given concomitantly with drugs known to induce hyperkalemia, i.e. he is on home digoxin. He was recently discharged 2/14/20 on Bactrim for infected surgical site that grew Serratia marcescens susceptible to Bactrim.    - Elevated BUN/Cr to 50/1.9  - Would be unlikely to develop a UTI while on Bactrim which was started on 2/12 and finished on 2/19 for total of 7 days.   - In ED with leukocytosis to 12.34; his WBC was 12.77 when he was discharged on 2/14/2020 after ORIF for R hip intertrochanteric fracture. Did not meet sepsis criteria   - no fever, although elderly patients with UTI may present without fever    - Surgical sites at R hip, R knee, and R thigh without evidence of superficial infection, cellulitis, abscess.   - Appreciated H&P note: there is some confusion to as to whether UA is from chronic Loja or not.     RECOMMENDATIONS  - monitor off antibiotics; d/c meropenem   - Will discuss with urology; doubt UTI but because patient was symptomatic, may re-initiate antibiotic   - f/u culture     This is a pended note. All final recommendations to follow pending discussion with ID Attending ASSESSMENT  64yMale with a PMH of HTN, DLD, CAD s/p CAB 2018, HFrEF (25%-30% on 2/2020 echo) s/p BiV placement, DM on insulin pump, pulmonary hyptertension, Celiac disease, diverticulitis, s/p total R knee replacement October 2019 complicated by MRSA infection s/p Abx w/PICC and R thigh skin graft, and R hip intertrochanteric fracture 3 weeks ago 1/31/2020 due to falling asleep while standing s/p ORIF in which surgical site grew Serratia marcescens, E. coli, skin tatum, presented to ED 2/20 for altered mental status. Patient appears to have experienced sensory misperception, disorientation, with return to baseline.     IMPRESSION  # Metabolic encephalopathy, possibly secondary to Uremia/MOISES likely secondary to recent Bactrim use; differential includes septic vs hyponatremic encephalopathy vs psychiatric d/o, delirium   - trimethoprim may cause decreased potassium excretion causing hyperkalemia when administered to patients with underlying disorders of potassium metabolism, with renal insufficiency or when given concomitantly with drugs known to induce hyperkalemia, i.e. he is on home digoxin. He was recently discharged 2/14/20 on Bactrim for infected surgical site that grew Serratia marcescens susceptible to Bactrim, Ecoli.    - Elevated BUN/Cr to 50/1.9  - Would be unlikely to develop a UTI while on Bactrim which was started on 2/12 and finished on 2/19 for total of 7 days.   - In ED with leukocytosis to 12.34; his WBC was 12.77 when he was discharged on 2/14/2020 after ORIF for R hip intertrochanteric fracture. Did not meet sepsis criteria   - afebrile   - Surgical sites at R hip, R knee, and R thigh without evidence of superficial infection, cellulitis, abscess.   - Appreciated H&P note: there is some confusion to as to whether UA is from chronic Loja or not.     RECOMMENDATIONS  - monitor off antibiotics; d/c meropenem . if any worsening sx or fever or rising wbc would restart  - Will discuss with urology  - f/u culture
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
## 2020-02-21 NOTE — ED PROVIDER NOTE - NS ED ROS FT
Constitutional: no fever, chills, no recent weight loss, change in appetite or malaise  Eyes: no redness/discharge/pain/vision changes  ENT: no rhinorrhea/ear pain/sore throat  Cardiac: No chest pain, SOB or edema.  Respiratory: No cough or respiratory distress  GI: No nausea, vomiting, diarrhea or abdominal pain.  : No dysuria, frequency, urgency or hematuria  MS: no pain to back or extremities, no loss of ROM, no weakness  Neuro: + AMS. No headache or weakness. No LOC.  Skin: No skin rash.  Endocrine: No history of thyroid disease or diabetes.

## 2020-02-22 DIAGNOSIS — R41.0 DISORIENTATION, UNSPECIFIED: ICD-10-CM

## 2020-02-22 DIAGNOSIS — F05 DELIRIUM DUE TO KNOWN PHYSIOLOGICAL CONDITION: ICD-10-CM

## 2020-02-22 DIAGNOSIS — G93.41 METABOLIC ENCEPHALOPATHY: ICD-10-CM

## 2020-02-22 LAB
ANION GAP SERPL CALC-SCNC: 14 MMOL/L — SIGNIFICANT CHANGE UP (ref 7–14)
BUN SERPL-MCNC: 34 MG/DL — HIGH (ref 10–20)
CALCIUM SERPL-MCNC: 9.4 MG/DL — SIGNIFICANT CHANGE UP (ref 8.5–10.1)
CHLORIDE SERPL-SCNC: 90 MMOL/L — LOW (ref 98–110)
CO2 SERPL-SCNC: 29 MMOL/L — SIGNIFICANT CHANGE UP (ref 17–32)
CREAT SERPL-MCNC: 1.4 MG/DL — SIGNIFICANT CHANGE UP (ref 0.7–1.5)
FERRITIN SERPL-MCNC: 384 NG/ML — SIGNIFICANT CHANGE UP (ref 30–400)
FOLATE SERPL-MCNC: 8.1 NG/ML — SIGNIFICANT CHANGE UP
GLUCOSE BLDC GLUCOMTR-MCNC: 230 MG/DL — HIGH (ref 70–99)
GLUCOSE BLDC GLUCOMTR-MCNC: 299 MG/DL — HIGH (ref 70–99)
GLUCOSE BLDC GLUCOMTR-MCNC: 302 MG/DL — HIGH (ref 70–99)
GLUCOSE BLDC GLUCOMTR-MCNC: 318 MG/DL — HIGH (ref 70–99)
GLUCOSE SERPL-MCNC: 397 MG/DL — HIGH (ref 70–99)
HCT VFR BLD CALC: 39.3 % — LOW (ref 42–52)
HGB BLD-MCNC: 11.8 G/DL — LOW (ref 14–18)
MAGNESIUM SERPL-MCNC: 2.4 MG/DL — SIGNIFICANT CHANGE UP (ref 1.8–2.4)
MCHC RBC-ENTMCNC: 22.3 PG — LOW (ref 27–31)
MCHC RBC-ENTMCNC: 30 G/DL — LOW (ref 32–37)
MCV RBC AUTO: 74.3 FL — LOW (ref 80–94)
NRBC # BLD: 0 /100 WBCS — SIGNIFICANT CHANGE UP (ref 0–0)
PLATELET # BLD AUTO: 304 K/UL — SIGNIFICANT CHANGE UP (ref 130–400)
POTASSIUM SERPL-MCNC: 4.7 MMOL/L — SIGNIFICANT CHANGE UP (ref 3.5–5)
POTASSIUM SERPL-SCNC: 4.7 MMOL/L — SIGNIFICANT CHANGE UP (ref 3.5–5)
RBC # BLD: 5.29 M/UL — SIGNIFICANT CHANGE UP (ref 4.7–6.1)
RBC # FLD: 23.4 % — HIGH (ref 11.5–14.5)
SODIUM SERPL-SCNC: 133 MMOL/L — LOW (ref 135–146)
VIT B12 SERPL-MCNC: 1061 PG/ML — SIGNIFICANT CHANGE UP (ref 232–1245)
WBC # BLD: 10.23 K/UL — SIGNIFICANT CHANGE UP (ref 4.8–10.8)
WBC # FLD AUTO: 10.23 K/UL — SIGNIFICANT CHANGE UP (ref 4.8–10.8)

## 2020-02-22 PROCEDURE — 99223 1ST HOSP IP/OBS HIGH 75: CPT

## 2020-02-22 PROCEDURE — 99233 SBSQ HOSP IP/OBS HIGH 50: CPT

## 2020-02-22 RX ORDER — INSULIN LISPRO 100/ML
6 VIAL (ML) SUBCUTANEOUS
Refills: 0 | Status: DISCONTINUED | OUTPATIENT
Start: 2020-02-22 | End: 2020-03-19

## 2020-02-22 RX ORDER — KETOROLAC TROMETHAMINE 30 MG/ML
30 SYRINGE (ML) INJECTION ONCE
Refills: 0 | Status: DISCONTINUED | OUTPATIENT
Start: 2020-02-22 | End: 2020-02-22

## 2020-02-22 RX ORDER — INSULIN GLARGINE 100 [IU]/ML
20 INJECTION, SOLUTION SUBCUTANEOUS AT BEDTIME
Refills: 0 | Status: DISCONTINUED | OUTPATIENT
Start: 2020-02-22 | End: 2020-03-19

## 2020-02-22 RX ADMIN — PRASUGREL 10 MILLIGRAM(S): 5 TABLET, FILM COATED ORAL at 11:11

## 2020-02-22 RX ADMIN — Medication 150 MILLIGRAM(S): at 06:44

## 2020-02-22 RX ADMIN — HEPARIN SODIUM 5000 UNIT(S): 5000 INJECTION INTRAVENOUS; SUBCUTANEOUS at 21:30

## 2020-02-22 RX ADMIN — HEPARIN SODIUM 5000 UNIT(S): 5000 INJECTION INTRAVENOUS; SUBCUTANEOUS at 14:53

## 2020-02-22 RX ADMIN — Medication 0.12 MILLIGRAM(S): at 05:28

## 2020-02-22 RX ADMIN — PANTOPRAZOLE SODIUM 40 MILLIGRAM(S): 20 TABLET, DELAYED RELEASE ORAL at 05:27

## 2020-02-22 RX ADMIN — INSULIN GLARGINE 20 UNIT(S): 100 INJECTION, SOLUTION SUBCUTANEOUS at 21:30

## 2020-02-22 RX ADMIN — DULOXETINE HYDROCHLORIDE 90 MILLIGRAM(S): 30 CAPSULE, DELAYED RELEASE ORAL at 11:11

## 2020-02-22 RX ADMIN — ATORVASTATIN CALCIUM 40 MILLIGRAM(S): 80 TABLET, FILM COATED ORAL at 11:11

## 2020-02-22 RX ADMIN — Medication 30 MILLIGRAM(S): at 11:06

## 2020-02-22 RX ADMIN — HEPARIN SODIUM 5000 UNIT(S): 5000 INJECTION INTRAVENOUS; SUBCUTANEOUS at 05:28

## 2020-02-22 RX ADMIN — Medication 81 MILLIGRAM(S): at 11:10

## 2020-02-22 RX ADMIN — Medication 30 MILLIGRAM(S): at 11:11

## 2020-02-22 RX ADMIN — TAMSULOSIN HYDROCHLORIDE 0.4 MILLIGRAM(S): 0.4 CAPSULE ORAL at 21:30

## 2020-02-22 RX ADMIN — Medication 8: at 08:04

## 2020-02-22 NOTE — BEHAVIORAL HEALTH ASSESSMENT NOTE - NSBHCHARTREVIEWIMAGING_PSY_A_CORE FT
CT Head No Cont (02.21.20 @ 02:26) >  IMPRESSION:   1.  No acute intra-cranial pathology.  2.  Moderate chronic microvascular ischemic changes.

## 2020-02-22 NOTE — PROGRESS NOTE ADULT - ASSESSMENT
ASSESSMENT  64yMale with a PMH of HTN, DLD, CAD s/p CAB 2018, HFrEF (25%-30% on 2/2020 echo) s/p BiV placement, DM on insulin pump, pulmonary hyptertension, Celiac disease, diverticulitis, s/p total R knee replacement October 2019 complicated by MRSA infection s/p Abx w/PICC and R thigh skin graft, and R hip intertrochanteric fracture 3 weeks ago 1/31/2020 due to falling asleep while standing s/p ORIF in which surgical site grew Serratia marcescens, E. coli, skin tatum, presented to ED 2/20 for altered mental status. Patient appears to have experienced sensory misperception, disorientation, with return to baseline.     IMPRESSION  #Metabolic encephalopathy  #Asymptomatic bacteriuria-     UCX GNR  UA 81 WBC  #Sepsis ruled out on admission   #MOISES likely 2/2 bactrim as also hyperK   #Knee and hip wounds clean appearing    RECOMMENDATIONS  - monitor off antibiotics, as no urinary sx, no fever, no leukocytosis unlikely that UTI is the cause of his mental status changes  - if fever or any urinary sx, start cefepime IV 1g q12h   - would change fields if not already changed
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
## 2020-02-22 NOTE — BEHAVIORAL HEALTH ASSESSMENT NOTE - DETAILS
None known Patient was adopted but knows his mother  in early 60s of heart disease per him. knee pain feeling confused

## 2020-02-22 NOTE — BEHAVIORAL HEALTH ASSESSMENT NOTE - RISK ASSESSMENT
Low Acute Suicide Risk Risk factors include acute medical illness, altered mental status, recent paranoid thought process, disorientation.   Protective factors include social support from wife and daughter, future oriented towards returning to work, no history of suicidality or IPP admissions, no acute suicidality, access to emergency medical services and known to utilize them in times of distress.  Based on above patient is at chronically elevated risk but does not warrant IPP admission at this time.
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
## 2020-02-22 NOTE — CONSULT NOTE ADULT - ASSESSMENT
63 yo male with extensive pmh as stated above complicated by surgical wound infection, UTI, treated with multiple antibiotics now with MOISES, with episode of acute delirium that have resolved.   No neuro work up is needed at this stime.

## 2020-02-22 NOTE — PROGRESS NOTE ADULT - SUBJECTIVE AND OBJECTIVE BOX
LOIDAFLOWER  64y, Male  Allergy: ACE inhibitors (Hives)  enalapril (Hives)  Hospital Day: 1d      Patient was seen and examined sitting in chair in the hallway. pt is A&Ox3 however, states he is being treated by [facility] doctors in the [facility] and he has been in the hospital for 4 days now. He however, is aware that he has bouts of disorientation and hallucinations? pt questioning if he will be seen by psychiatry and then states "because I don't want to be labeled anything" and pt proceeds to cry and is unable to be redirected       VITALS:  T(F): 96.6 (20 @ 12:25), Max: 97.9 (20 @ 20:17)  HR: 69 (20 @ 12:25)  BP: 111/58 (20 @ 12:25) (95/50 - 114/59)  RR: 15 (20 @ 12:25)  SpO2: 98% (20 @ 08:08)    PHYSICAL EXAM:  GENERAL: thin male sitting in hallway in extremely anxious   NECK: No evidence of swelling no palpable lymphadenopathy  CHEST/LUNG: clear to ausculation bilaterally no wheezes, rales, or rhonchi   HEART/VASC: RRR, No murmurs, rubs, or gallops appreciated, 2+ equal bilateral radial pulse  ABDOMEN: Soft, non tender non distended +bowel sounds in all quadrants, no palpable organomegaly   EXTREMITIES:  No clubbing and range of motion limited RLE 2/2 pain  : fields in place draining yellow urine without sediment/clots   NEURO/PSYCH: A&Ox3 with tangential thoughts, labile mood, crying    TESTS & MEASUREMENTS:  Weight (Kg): 95.5 (20 @ 14:58)  BMI (kg/m2): 27.8 ()    20 @ 07:01  -  20 @ 07:00  --------------------------------------------------------  IN: 0 mL / OUT: 1200 mL / NET: -1200 mL    20 @ 07:01  -  20 @ 07:00  --------------------------------------------------------  IN: 0 mL / OUT: 1000 mL / NET: -1000 mL    20 @ 07:01  -  20 @ 14:00  --------------------------------------------------------  IN: 450 mL / OUT: 0 mL / NET: 450 mL                            11.8   10.23 )-----------( 304      ( 2020 07:27 )             39.3     PT/INR - ( 2020 23:47 )   PT: 14.30 sec;   INR: 1.24 ratio         PTT - ( 2020 23:47 )  PTT:23.8 sec      133<L>  |  90<L>  |  34<H>  ----------------------------<  397<H>  4.7   |  29  |  1.4    Ca    9.4      2020 07:27  Mg     2.4         TPro  7.3  /  Alb  3.7  /  TBili  2.3<H>  /  DBili  x   /  AST  31  /  ALT  19  /  AlkPhos  233<H>      LIVER FUNCTIONS - ( 2020 11:15 )  Alb: 3.7 g/dL / Pro: 7.3 g/dL / ALK PHOS: 233 U/L / ALT: 19 U/L / AST: 31 U/L / GGT: x           CARDIAC MARKERS ( 2020 11:15 )  x     / 0.06 ng/mL / 163 U/L / x     / 4.8 ng/mL  CARDIAC MARKERS ( 2020 23:47 )  x     / 0.07 ng/mL / x     / x     / x            Culture - Urine (collected 20 @ 01:20)  Source: .Urine Catheterized  Preliminary Report (20 @ 09:09):    >100,000 CFU/ml Gram Negative Rods    Culture - Blood (collected 20 @ 23:47)  Source: .Blood Blood  Preliminary Report (20 @ 07:01):    No growth to date.    Culture - Blood (collected 20 @ 23:47)  Source: .Blood Blood  Preliminary Report (20 @ 07:01):    No growth to date.      Urinalysis Basic - ( 2020 01:20 )    Color: Light Orange / Appearance: Slightly Turbid / S.024 / pH: x  Gluc: x / Ketone: Negative  / Bili: Negative / Urobili: 3 mg/dL   Blood: x / Protein: Trace / Nitrite: Negative   Leuk Esterase: Moderate / RBC: >720 /HPF / WBC 81 /HPF   Sq Epi: x / Non Sq Epi: 0 /HPF / Bacteria: Moderate        MEDICATIONS:  MEDICATIONS  (STANDING):  aspirin enteric coated 81 milliGRAM(s) Oral daily  atorvastatin Oral Tab/Cap - Peds 40 milliGRAM(s) Oral daily  dextrose 5%. 1000 milliLiter(s) (50 mL/Hr) IV Continuous <Continuous>  dextrose 50% Injectable 12.5 Gram(s) IV Push once  dextrose 50% Injectable 25 Gram(s) IV Push once  dextrose 50% Injectable 25 Gram(s) IV Push once  digoxin     Tablet 0.125 milliGRAM(s) Oral daily  DULoxetine 90 milliGRAM(s) Oral daily  heparin  Injectable 5000 Unit(s) SubCutaneous every 8 hours  insulin glargine Injectable (LANTUS) 20 Unit(s) SubCutaneous at bedtime  insulin lispro (HumaLOG) corrective regimen sliding scale   SubCutaneous three times a day before meals  insulin lispro Injectable (HumaLOG) 6 Unit(s) SubCutaneous three times a day before meals  metoprolol succinate  milliGRAM(s) Oral daily  pantoprazole    Tablet 40 milliGRAM(s) Oral before breakfast  prasugrel 10 milliGRAM(s) Oral daily  tamsulosin Oral Tab/Cap - Peds 0.4 milliGRAM(s) Oral at bedtime    MEDICATIONS  (PRN):  acetaminophen   Tablet .. 650 milliGRAM(s) Oral every 6 hours PRN Temp greater or equal to 38C (100.4F), Moderate Pain (4 - 6)  dextrose 40% Gel 15 Gram(s) Oral once PRN Blood Glucose LESS THAN 70 milliGRAM(s)/deciliter  glucagon  Injectable 1 milliGRAM(s) IntraMuscular once PRN Glucose LESS THAN 70 milligrams/deciliter

## 2020-02-22 NOTE — CONSULT NOTE ADULT - SUBJECTIVE AND OBJECTIVE BOX
Neurology Consult    Patient is a 64y old  Male who presents with a chief complaint of Altered mental status (2020 13:59)      HPI:  [64 year old gentleman]    CC: Altered mental status     PM/SH: HTN, DLD, CAD s/p CABG, HFrEF (25-30% on ECHO 2020, 20-25% on MUGA scan in 2019) s/p st carlyle BiV placement 2020, DM (on insulin pump), pulmonary hypertension, Celiac disease, diverticulitis, s/p right total knee replacement 2019 complicated by infection s/p abx course w/ PICC and  right thigh with skin graft, right hip fracture s/p ORIF 2020     History of Present Illness goes back to the day of admission. The patient as send here from NH where he was for rehab after his recent ORIF. Per the records, patient was agitated and combative with staff. Patient was claiming "there was a person sneaking around by his room and he wanted the staff from NH to call the Police." Patient also stated he was scared so he was screaming.  Per the patient he does not know exactly what happened. He said he realized he was acting weird but was not aware of why. He said he got into confrontations with the nursing staff, the police and the EMS because they were being aggressive towards him.   Patient otherwise denies any hallucination/ HA/dizziness/chest pain/sob/abd pain/n/v/d.    In the ED, vitals were stable. Labs showed elevated WBC (it has been before). Na was 130 (became low after last admission), K was 5.2, creatinine was 1.9 from 0.8. Lactate was 2.4. UA was grossly positive. Per the ED team and signout and patient fields was changed and sample was from urine. Per their notes, the patient refused fields removal. (2020 04:11)    Neuro: patient is examined at the bedside, he is aaox3, answers questions and follows commands. Was delirious and combative earlier today, but currently is back to his baseline. His exam is non focal. CTH is unremarkable. Patient is hyponatremic with elevated BUN and  with GFR of 53.      PAST MEDICAL & SURGICAL HISTORY:  Diabetic neuropathy  STEMI (ST elevation myocardial infarction)  Diverticulitis  MRSA bacteremia  History of celiac disease  CHF (congestive heart failure): EF ~ 25%  HTN (hypertension)  Diabetes: on insulin pump  Blood clot due to device, implant, or graft: was on blood thinners  HLD (hyperlipidemia)  Osteoarthritis  Atherosclerosis of coronary artery: CAD (coronary artery disease)  Status post percutaneous transluminal coronary angioplasty: in   History of open reduction and internal fixation (ORIF) procedure  Surgery, elective: Right shoulder  Surgery, elective: right knee wound debridement  S/P TKR (total knee replacement), right: with infection Mrsa   per pt he was cleared from MRSA infection  S/P CABG x 1:   Stented coronary artery: 10/18 heart attack  INFERIOR WALL MI  Other postprocedural status: Fixation hardware in foot LEFT      FAMILY HISTORY:  Family history of allergies: daughter  Family history of heart disease (Mother)      Social History: (-) x 3    Allergies    ACE inhibitors (Hives)  enalapril (Hives)    Intolerances        MEDICATIONS  (STANDING):  aspirin enteric coated 81 milliGRAM(s) Oral daily  atorvastatin Oral Tab/Cap - Peds 40 milliGRAM(s) Oral daily  dextrose 5%. 1000 milliLiter(s) (50 mL/Hr) IV Continuous <Continuous>  dextrose 50% Injectable 12.5 Gram(s) IV Push once  dextrose 50% Injectable 25 Gram(s) IV Push once  dextrose 50% Injectable 25 Gram(s) IV Push once  digoxin     Tablet 0.125 milliGRAM(s) Oral daily  DULoxetine 90 milliGRAM(s) Oral daily  heparin  Injectable 5000 Unit(s) SubCutaneous every 8 hours  insulin glargine Injectable (LANTUS) 20 Unit(s) SubCutaneous at bedtime  insulin lispro (HumaLOG) corrective regimen sliding scale   SubCutaneous three times a day before meals  insulin lispro Injectable (HumaLOG) 6 Unit(s) SubCutaneous three times a day before meals  metoprolol succinate  milliGRAM(s) Oral daily  pantoprazole    Tablet 40 milliGRAM(s) Oral before breakfast  prasugrel 10 milliGRAM(s) Oral daily  tamsulosin Oral Tab/Cap - Peds 0.4 milliGRAM(s) Oral at bedtime    MEDICATIONS  (PRN):  acetaminophen   Tablet .. 650 milliGRAM(s) Oral every 6 hours PRN Temp greater or equal to 38C (100.4F), Moderate Pain (4 - 6)  dextrose 40% Gel 15 Gram(s) Oral once PRN Blood Glucose LESS THAN 70 milliGRAM(s)/deciliter  glucagon  Injectable 1 milliGRAM(s) IntraMuscular once PRN Glucose LESS THAN 70 milligrams/deciliter      Review of systems:    Constitutional: No fever, weight loss or fatigue    Eyes: No eye pain or discharge  ENMT:  No difficulty hearing; No sinus or throat pain  Neck: No pain or stiffness  Respiratory: No cough, wheezing, chills or hemoptysis  Cardiovascular: No chest pain, palpitations, shortness of breath, dyspnea on exertion  Gastrointestinal: No abdominal pain, nausea, vomiting or hematemesis; No diarrhea or constipation.   Genitourinary: No dysuria, frequency, hematuria or incontinence  Neurological: As per HPI  Skin: No rashes or lesions   Endocrine: No heat or cold intolerance; No hair loss  Musculoskeletal: No joint pain or swelling  Psychiatric: No depression, anxiety, mood swings  Heme/Lymph: No easy bruising or bleeding gums    Vital Signs Last 24 Hrs  T(C): 35.9 (2020 12:25), Max: 36.6 (2020 20:17)  T(F): 96.6 (2020 12:25), Max: 97.9 (2020 20:17)  HR: 69 (2020 12:25) (69 - 74)  BP: 111/58 (2020 12:25) (95/50 - 114/59)  BP(mean): --  RR: 15 (2020 12:25) (15 - 18)  SpO2: 98% (2020 08:08) (98% - 98%)    Neurologic Examination:  General:  Appearance is consistent with chronologic age.  No abnormal facies.   General: The patient is oriented to person, place, time and date.  Recent and remote memory intact.  Fund of knowledge is intact and normal.  Language with normal repetition, comprehension and naming.  Nondysarthric.    Cranial nerves: intact VA, VFF.  EOMI w/o nystagmus, skew or reported double vision.  PERRL.  No ptosis/weakness of eyelid closure.  Facial sensation is normal with normal bite.  No facial asymmetry.  Hearing grossly intact b/l.  Palate elevates midline.  Tongue midline.  Motor examination:   Normal tone, bulk and range of motion.  No tenderness, twitching, tremors or involuntary movements.  Formal Muscle Strength Testing: (MRC grade R/L) 5/5 UE; 4/5 LE.  No observable drift.  Reflexes:   2+ b/l pectoralis, biceps, triceps, brachioradialis, patella and Achilles.  Plantar response downgoing b/l.  Jaw jerk, Jada, clonus absent.  Sensory examination:   Intact to light touch and pinprick, pain, temperature and proprioception and vibration in all extremities.  Cerebellum:   FTN/HKS intact with normal KARIS in all limbs.  No dysmetria or dysdiadokinesia.     Labs:   CBC Full  -  ( 2020 07:27 )  WBC Count : 10.23 K/uL  RBC Count : 5.29 M/uL  Hemoglobin : 11.8 g/dL  Hematocrit : 39.3 %  Platelet Count - Automated : 304 K/uL  Mean Cell Volume : 74.3 fL  Mean Cell Hemoglobin : 22.3 pg  Mean Cell Hemoglobin Concentration : 30.0 g/dL  Auto Neutrophil # : x  Auto Lymphocyte # : x  Auto Monocyte # : x  Auto Eosinophil # : x  Auto Basophil # : x  Auto Neutrophil % : x  Auto Lymphocyte % : x  Auto Monocyte % : x  Auto Eosinophil % : x  Auto Basophil % : x        133<L>  |  90<L>  |  34<H>  ----------------------------<  397<H>  4.7   |  29  |  1.4    Ca    9.4      2020 07:27  Mg     2.4         TPro  7.3  /  Alb  3.7  /  TBili  2.3<H>  /  DBili  x   /  AST  31  /  ALT  19  /  AlkPhos  233<H>      LIVER FUNCTIONS - ( 2020 11:15 )  Alb: 3.7 g/dL / Pro: 7.3 g/dL / ALK PHOS: 233 U/L / ALT: 19 U/L / AST: 31 U/L / GGT: x           PT/INR - ( 2020 23:47 )   PT: 14.30 sec;   INR: 1.24 ratio         PTT - ( 2020 23:47 )  PTT:23.8 sec  Urinalysis Basic - ( 2020 01:20 )    Color: Light Orange / Appearance: Slightly Turbid / S.024 / pH: x  Gluc: x / Ketone: Negative  / Bili: Negative / Urobili: 3 mg/dL   Blood: x / Protein: Trace / Nitrite: Negative   Leuk Esterase: Moderate / RBC: >720 /HPF / WBC 81 /HPF   Sq Epi: x / Non Sq Epi: 0 /HPF / Bacteria: Moderate          Neuroimaging:  NCHCT:   CT Angiography/Perfusion:  MRI Brain NC:  MRA Head/Neck:  EEG:    Assessment:  This is a 64y Male with h/o     Plan:   20 @ 18:13

## 2020-02-22 NOTE — PROGRESS NOTE ADULT - SUBJECTIVE AND OBJECTIVE BOX
LOIDA, JOHN  64y, Male  Allergy: ACE inhibitors (Hives)  enalapril (Hives)      LOS  1d    CHIEF COMPLAINT: Altered mental status (2020 10:39)      INTERVAL EVENTS/HPI  - No acute events overnight, denies urinary sx, castillo, ucx GNR  - confused, saying last night in the hospital he [facility] [place] [facility] and had [place] [facility] doctors/nurses  - T(F): , Max: 97.9 (20 @ 20:17)  - Denies any worsening symptoms  - Tolerating medication  - WBC Count: 10.23 (20 @ 07:27)  WBC Count: 12.47 (20 @ 11:15)  - Creatinine, Serum: 1.4 (20 @ 07:27)  Creatinine, Serum: 1.5 (20 @ 11:15)       ROS  General: Denies rigors, nightsweats  HEENT: Denies headache, rhinorrhea, sore throat, eye pain  CV: Denies CP, palpitations  PULM: Denies wheezing, hemoptysis  GI: Denies hematemesis, hematochezia, melena  : Denies discharge, hematuria  MSK: Denies arthralgias, myalgias  SKIN: Denies rash, lesions  NEURO: Denies paresthesias, weakness  PSYCH: Denies depression, anxiety    VITALS:  T(F): 96.6, Max: 97.9 (20 @ 20:17)  HR: 69  BP: 111/58  RR: 15Vital Signs Last 24 Hrs  T(C): 35.9 (2020 12:25), Max: 36.6 (2020 20:17)  T(F): 96.6 (2020 12:25), Max: 97.9 (2020 20:17)  HR: 69 (2020 12:25) (69 - 74)  BP: 111/58 (2020 12:25) (95/50 - 114/59)  BP(mean): --  RR: 15 (2020 12:25) (15 - 18)  SpO2: 98% (2020 08:08) (98% - 98%)    PHYSICAL EXAM:  Gen: NAD, resting in bed, confused, pleasant  HEENT: Normocephalic, atraumatic  Neck: supple, no lymphadenopathy  CV: Regular rate & regular rhythm  Lungs: decreased BS at bases, no fremitus  Abdomen: Soft, BS present  Ext: Warm, well perfused  Neuro: non focal, awake  Skin: no rash, no erythema, R knee and hip wounds clean   Lines: no phlebitis  CASTILLO     FH: Non-contributory  Social Hx: Non-contributory    TESTS & MEASUREMENTS:                        11.8   10.23 )-----------( 304      ( 2020 07:27 )             39.3         133<L>  |  90<L>  |  34<H>  ----------------------------<  397<H>  4.7   |  29  |  1.4    Ca    9.4      2020 07:27  Mg     2.4         TPro  7.3  /  Alb  3.7  /  TBili  2.3<H>  /  DBili  x   /  AST  31  /  ALT  19  /  AlkPhos  233<H>      eGFR if Non African American: 53 mL/min/1.73M2 (20 @ 07:27)  eGFR if : 62 mL/min/1.73M2 (20 @ 07:27)    LIVER FUNCTIONS - ( 2020 11:15 )  Alb: 3.7 g/dL / Pro: 7.3 g/dL / ALK PHOS: 233 U/L / ALT: 19 U/L / AST: 31 U/L / GGT: x           Urinalysis Basic - ( 2020 01:20 )    Color: Light Orange / Appearance: Slightly Turbid / S.024 / pH: x  Gluc: x / Ketone: Negative  / Bili: Negative / Urobili: 3 mg/dL   Blood: x / Protein: Trace / Nitrite: Negative   Leuk Esterase: Moderate / RBC: >720 /HPF / WBC 81 /HPF   Sq Epi: x / Non Sq Epi: 0 /HPF / Bacteria: Moderate        Culture - Urine (collected 20 @ 01:20)  Source: .Urine Catheterized  Preliminary Report (20 @ 09:09):    >100,000 CFU/ml Gram Negative Rods    Culture - Blood (collected 20 @ 23:47)  Source: .Blood Blood  Preliminary Report (20 @ 07:01):    No growth to date.    Culture - Blood (collected 20 @ 23:47)  Source: .Blood Blood  Preliminary Report (20 @ 07:01):    No growth to date.    Culture - Surgical Swab (collected 20 @ 15:20)  Source: .Surgical Swab SOURCE: RIGHT LEG EXTREMITY  Final Report (02-10-20 @ 11:31):    Numerous Serratia marcescens    Moderate Escherichia coli    Normal skin tatum isolated  Organism: Serratia marcescens  Escherichia coli (02-10-20 @ 11:31)  Organism: Escherichia coli (02-10-20 @ 11:31)      -  Amikacin: S <=16      -  Amoxicillin/Clavulanic Acid: I 16/8      -  Ampicillin: R >16 These ampicillin results predict results for amoxicillin      -  Ampicillin/Sulbactam: R >16/8 Enterobacter, Citrobacter, and Serratia may develop resistance during prolonged therapy (3-4 days)      -  Aztreonam: S <=4      -  Cefazolin: R >16 Enterobacter, Citrobacter, and Serratia may develop resistance during prolonged therapy (3-4 days)      -  Cefepime: S <=2      -  Cefoxitin: S <=8      -  Ceftriaxone: S <=1 Enterobacter, Citrobacter, and Serratia may develop resistance during prolonged therapy      -  Ciprofloxacin: R >2      -  Ertapenem: S <=0.5      -  Gentamicin: S <=2      -  Imipenem: S <=1      -  Levofloxacin: R >4      -  Meropenem: S <=1      -  Piperacillin/Tazobactam: S 16      -  Tobramycin: S <=2      -  Trimethoprim/Sulfamethoxazole: S <=2/38      Method Type: YOLIE  Organism: Serratia marcescens (02-10-20 @ 11:31)      -  Amikacin: S <=16      -  Amoxicillin/Clavulanic Acid: R >16/8      -  Ampicillin: R 16 These ampicillin results predict results for amoxicillin      -  Ampicillin/Sulbactam: R 16/8 Enterobacter, Citrobacter, and Serratia may develop resistance during prolonged therapy (3-4 days)      -  Aztreonam: S <=4      -  Cefazolin: R >16 Enterobacter, Citrobacter, and Serratia may develop resistance during prolonged therapy (3-4 days)      -  Cefepime: S <=2      -  Cefoxitin: R 16      -  Ceftriaxone: S <=1 Enterobacter, Citrobacter, and Serratia may develop resistance during prolonged therapy      -  Ciprofloxacin: S <=1      -  Ertapenem: S <=0.5      -  Gentamicin: S <=2      -  Levofloxacin: S <=2      -  Meropenem: S <=1      -  Piperacillin/Tazobactam: S <=8      -  Tobramycin: S 4      -  Trimethoprim/Sulfamethoxazole: S <=2/38      Method Type: YOLIE        Lactate, Blood: 1.2 mmol/L (20 @ 11:15)  Blood Gas Venous - Lactate: 2.8 mmoL/L (20 @ 02:16)  Lactate, Blood: 2.4 mmol/L (20 @ 23:47)      INFECTIOUS DISEASES TESTING  MRSA PCR Result.: Negative (10-14-19 @ 17:18)      RADIOLOGY & ADDITIONAL TESTS:  I have personally reviewed the last available Chest xray  CXR  Xray Chest 1 View- PORTABLE-Urgent:   EXAM:  XR CHEST PORTABLE URGENT 1V            PROCEDURE DATE:  2020            INTERPRETATION:  Clinical History / Reason for exam: Baseline    Comparison : Chest radiograph 2020.    Technique/Positioning: Frontal radiograph of the chest. Rotated exam.    Findings:    Support devices: Stable left ICD..    Cardiac/mediastinum/hilum: Cardiomegaly.    Lung parenchyma/Pleura: Low lung volumes. No consolidation. No pneumothorax.    Skeleton/soft tissues: Stable.    Impression:      No radiographic evidence of acute cardiopulmonary disease.                      GABRIEL COBURN M.D., ATTENDING RADIOLOGIST  This document has been electronically signed. 2020 10:21AM             (20 @ 01:16)      CT      CARDIOLOGY TESTING  12 Lead ECG:   Ventricular Rate 64 BPM    Atrial Rate 64 BPM    P-R Interval 162 ms    QRS Duration 166 ms    Q-T Interval 426 ms    QTC Calculation(Bezet) 439 ms    P Axis 60 degrees    R Axis -41 degrees    T Axis 64 degrees    Diagnosis Line Atrial-sensed ventricular-paced rhythm with occasional Premature ventricular  complexes  Abnormal ECG    Confirmed by SERGIO BALLARD MD ([id number]) on 2020 10:01:05 AM (20 @ 14:46)  Transthoracic Echocardiogram:    EXAM:  2-D ECHO (TTE) COMPLETE        PROCEDURE DATE:  2020      INTERPRETATION:  REPORT:    TRANSTHORACIC ECHOCARDIOGRAM REPORT         Patient Name:   FLOWER MARISCAL Accession #: [id number]  Medical Rec #:  [id number]         Height:      71.0 in 180.3 cm  YOB: 1955         Weight:      202.0 lb 91.63 kg  Patient Age:    64 years         BSA:         2.12 m²  Patient Gender: M                BP:          102/51 mmHg       Date of Exam:        2020 2:37:00 PM  Referring Physician: [id number] ED UNASSIGNED  Sonographer:         Adri marie  Reading Physician:   Hermelindo Beebe M.D.    Procedure:   2D Echo/Doppler/Color Doppler Complete.  Indications: I42.9 - Cardiomyopathy, unspecified  Diagnosis:   Cardiomyopathy, unspecified - I42.9         Summary:   1. Left ventricular ejection fraction, by visual estimation, is 25 to 30%.   2. Severely decreased segmental left ventricular systolic function.   3. Moderate to severely increased left ventricular internal cavity size.   4. Mild to moderate mitral valve regurgitation.   5. Mild-moderate tricuspid regurgitation.   6. Mild aortic regurgitation.   7. Estimated pulmonary artery systolic pressure is 51.8 mmHg assuming a right atrial pressure of 15 mmHg, whichis consistent with moderate pulmonary hypertension.    PHYSICIAN INTERPRETATION:  Left Ventricle: The left ventricular internal cavity size is moderate to severely increased. Left ventricular wall thickness is normal. Left ventricular ejection fraction, by visual estimation, is 25 to 30%. Severely decreased segmental left ventricular systolic function.  Right Ventricle: Normal right ventricular size and function.  Left Atrium: Left atrial enlargement.  Right Atrium: Right atrial enlargement.  Pericardium: There is no evidence of pericardial effusion.  Mitral Valve: No evidence of mitral stenosis. Mild to moderate mitral valve regurgitation is seen.  Tricuspid Valve: Mild-moderate tricuspid regurgitation is visualized. Estimated pulmonary artery systolic pressure is 51.8 mmHg assuming a right atrial pressure of 15 mmHg, which is consistent with moderate pulmonary hypertension.  Aortic Valve: No evidence of aortic stenosis. Aortic valve thickening with normal leaflet opening. Mild aortic valve regurgitation is seen.  Pulmonic Valve: Trace pulmonic valve regurgitation.  Aorta: The aortic root is normal in size and structure.  Venous: The inferior vena cava was dilated, with respiratory size variation less than 50%, consistent with elevated right atrial pressure.  Additional Comments: A pacer wire is visualized in the right atrium and right ventricle.       2D AND M-MODE MEASUREMENTS (normal ranges within parentheses):  Left                  Normal   Aorta/Left             Normal  Ventricle:                     Atrium:  IVSd (2D):  1.01 cm  (0.7-1.1) AoV Cusp       2.24  (1.5-2.6)  LVPWd (2D): 1.03 cm  (0.7-1.1) Separation:     cm  LVIDd (2D): 5.53 cm  (3.4-5.7) Left Atrium    4.29  (1.9-4.0)  LVIDs (2D): 4.81 cm            (Mmode):        cm  LV FS (2D):  13.0 %   (>25%)   LA Volume      33.4  IVSd        0.81 cm  (0.7-1.1) Index         ml/m²  (Mmode):                       Right  LVPWd       0.89 cm  (0.7-1.1) Ventricle:  (Mmode):                       RVd (2D):      3.74 cm  LVIDd       6.68 cm  (3.4-5.7)  (Mmode):  LVIDs       5.36 cm  (Mmode):  LV FS        19.8 %   (>25%)  (Mmode):  Relative      0.37    (<0.42)  Wall  Thickness  Rel. Wall     0.27    (<0.42)  Thickness  Mm  LV Mass      114.4  Index:        g/m²  Mmode    SPECTRAL DOPPLER ANALYSIS:  LV DIASTOLIC FUNCTION:  MV Peak E: 1.14 m/s Decel Time: 139 msec  MV Peak A: 0.27 m/s  E/A Ratio: 4.18    Aortic Valve:  AoV VMax:    1.25 m/s AoV Area, Vmax: 2.47 cm² Vmax Indx: 1.17 cm²/m²  AoV Pk Grad: 6.3 mmHg    LVOT Vmax: 0.84 m/s  LVOT VTI:  0.18 m  LVOT Diam: 2.17 cm    Aortic Insufficiency:  AI Half-time:  221 msec  AI Decel Rate: 1.58 m/s²    Mitral Valve:  MV P1/2 Time: 40.33 msec  MV Area, PHT: 5.45 cm²    Tricuspid Valve and PA/RV Systolic Pressure:TR Max Velocity: 3.03 m/s RA Pressure: 15 mmHg RVSP/PASP: 51.8 mmHg    Pulmonic Valve:  PV Max Velocity: 0.89 m/s PV Max PG: 3.2 mmHg PV Mean PG:       [id number] Hermelindo Beebe M.D., Electronically signed on 2020 at 5:49:05 PM              *** Final ***                    HERMELINDO BEEBE MD  This document has been electronically signed. 2020  2:37PM             (20 @ 14:37)      MEDICATIONS  aspirin enteric coated 81  atorvastatin Oral Tab/Cap - Peds 40  dextrose 5%. 1000  dextrose 50% Injectable 12.5  dextrose 50% Injectable 25  dextrose 50% Injectable 25  digoxin     Tablet 0.125  DULoxetine 90  heparin  Injectable 5000  insulin glargine Injectable (LANTUS) 20  insulin lispro (HumaLOG) corrective regimen sliding scale   insulin lispro Injectable (HumaLOG) 6  metoprolol succinate   pantoprazole    Tablet 40  prasugrel 10  tamsulosin Oral Tab/Cap - Peds 0.4      WEIGHT  Weight (kg): 95.5 (20 @ 14:58)    ANTIBIOTICS:      All available historical records have been reviewed

## 2020-02-22 NOTE — CONSULT NOTE ADULT - ATTENDING COMMENTS
Patient examined this afternoon in presence of his family and in presence of hospitalist.  He has short term recall deficits -1/3.  He has trouble with attention and concentration with serial 7's though was able to correctly spell WORLD  backwards.  He was correct on year and month and location. Language for the most part was intact.   He had some difficulty drawing a clock.  He showed mild asterixis when arms held out and wrists extended.    Impression:  1.  Metabolic encephalopathy causing delerium.  2.  Possible underlying mild dementia  (AD or LBD) but difficult to tell in acute setting.    Recommend:  1.  Outpatient f/u with neuropsychologist at [facility].  If going to rehab consider neuropsychologic testing there if possible.  2.  Outpatient neurologic f/u in 2-3 weeks or sooner as needed.  Would not begin donepezil at this point.  3.  Please check TSH level. Patient examined this afternoon in presence of his family and in presence of hospitalist.  He has short term recall deficits -1/3.  He has trouble with attention and concentration with serial 7's though was able to correctly spell WORLD  backwards.  He was correct on year and month and location. Language for the most part was intact.   He had some difficulty drawing a clock.  He showed mild asterixis when arms held out and wrists extended.    Impression:  1.  Metabolic encephalopathy causing delerium.  2.  Possible underlying mild dementia  (AD or LBD) but difficult to tell in acute setting.  3.  Minimize CNS active medication including narcotics and benzodiazepines.    Recommend:  1.  Outpatient f/u with neuropsychologist at [facility].  If going to rehab consider neuropsychologic testing there if possible.  2.  Outpatient neurologic f/u in 2-3 weeks or sooner as needed.  Would not begin donepezil at this point.  3.  Please check TSH level.

## 2020-02-22 NOTE — BEHAVIORAL HEALTH ASSESSMENT NOTE - CASE SUMMARY
65 yo man with multiple medical conditions and metabolic derangements, prolonged illnesses and repeated surgeries over the past year, who presents with altered mental status. I examined the patient independently a few hours after the resident (above) and found the patient to be more attentive and goal-focused in our conversation than the resident had experienced earlier (the resident was present for my interview). Despite this. the patient was both anxious, distractible, ruminative and displayed a burst of intensely sad affect, grimacing with emotional distress (though unable to be tearful), as we were taking our leave.  In response to questions, the patient was moderately circumstantial. mildly tangential, and obsessively specific in his accounting of his recent health problems. MSE pt is alert but struggling to avert drowsiness; when speaking he struggles to maintain eye contact but is affable and agreeable; he denies suicidality and does not endorse any paranoia or delusionality - and reflects with insight on the suspiciousness he manifested at the rehabilitation facility prior to admission; and describes his visual "hallucinations" as visual illusions to which he attributes no meaning or emotional significance. IMPRESSION: Delirium due to metabolic encephalopathy due to multiple medical conditions. There is no evidence of a primary psychiatric disorder. A review of his behavior and history with his wife, with who we met later, supports our unqualified conclusion that his current mental state is a consequence of several concurrent physical/somatic stressors. With regard to infectious etiologies, delirium can persist for 1-2 weeks following resolution of acute evidence of infection. RECOMMEND: minimize sedation especially use of benzodiazepines; provide stimuli to reinforce a normal circadian rhythm as much as possible; very low-dose haloperidol for severe agitation. Will follow q2-3 days. Please contact us if there are any acute concerns. Provided recommendations to pt's wife.

## 2020-02-22 NOTE — PROGRESS NOTE ADULT - ASSESSMENT
#metabolic encephalopathy ?in the setting of UTI vs. hyponatremia (chronic) vs. underlying psychiatric illness vs. new onset dementia   A&Ox3 at this time however, labile and with tangential thoughts and active hallucinations   -seen by ID will monitor off antibiotics at this time  -monitor mental status   -trend BMP daily   -neuro consult  -psych eval  dementia workup   -avoid sedating agents and opioid pain medications at this time     #Heart failure with reduced ejection fraction - s/p AICD today 2/11/2020   - Euvolemic on exam   - Echo 12/13/2019: EF 11%, severely decreased global LV systolic function, grade III diastolic dysfunction, severe pulmonary hypertension  - MUGA scan with EF of 20-25%   - cont digoxin 0.125mg PO QD  - Continue with metoprolol succinate 150mg PO QD  - holding Lasix 40 and metolazone 2.5mg PO QD due to MOISES okay to resume when renal function returns to baseline     # CAD s/p CABG and PCI, stable  # Elevated troponins - Likely due to MOISES  - Continue with aspirin 81mg PO QD  - Continue with prasugrel 10mg PO QD  - Continue with atorvastatin 40mg PO QD  - Continue with metoprolol succinate 150mg PO QD  - Repeat cardiac enzymes     # Microcytic anemia - stable   -cont to monitor    # Celiac disease  - Patient is asymptomatic at this time  - Continue with gluten-restricted diet  - Follow outpatient with gastroenterology    # Diabetes mellitus type 2 with diabetic neuropathy  - Patient removed his insulin pump during last hospitalization   - Monitor fingerstick glucose  -cont present insulin basal bolus regimen     # Hypertension - Controlled   - Continue home meds     #anxiety  -cont cymbalta   -hold xanax   -f/u psych eval     Progress Note Handoff  Pending:  neuro, psych, mental status, safe dispo planning   Patient/Family discussion: spoke with pt at bedside given waxing and waning mental status will attempt to reach pts wife to update   Disposition: SNF

## 2020-02-22 NOTE — BEHAVIORAL HEALTH ASSESSMENT NOTE - SUMMARY
64-year-old, male, domiciled in [facility] with wife, father of adult daughter, employed as EMS manager, extensive past medical history with notable hospitalization January 31 to February 14, 2020 for fall with right hip fracture, with course complicated by delirium, discharged to [facility], readmitted on Feb 21, 2020 for altered mental status.    Based on assessment and historical data from chart review and collateral from the patients wife, the working diagnosis is delirium. Supporting this, [place] Cognitive Assessment (MOCA) was performed, which on writers rating, scored 15/30. Notably domains of visuospacial/executive, attention, delayed recall, and orientation were severely impaired, while domains of naming, language, and abstraction were unaffected (based on MOCA scoring technique).    Delirium could be explained by subacute inflammatory processes secondary to numerous physiological insults including right knee and hip replacement (October 2019/February 2020) with subsequent right thigh skin graft, acute kidney injury (had elevated creatinine on admission), hyperglycemia (serum glucose was 397 on this mornings lab work). Delirium could explain paranoid ideations, as well as visual hallucinations.     Patient did not appear agitated at this time and has not required treatment of agitation during this hospitalization. Recommend cautious use of CNS depressants during this hospitalization considering risks and benefits. Recommend encouraging natural circadian cycles via sleep hygiene, and appropriate light. 64-year-old, male, domiciled in [facility] with wife, father of adult daughter, employed as EMS manager, extensive past medical history with notable hospitalization January 31 to February 14, 2020 for fall with right hip fracture, with course complicated by delirium, discharged to [facility], readmitted on Feb 21, 2020 for altered mental status.    Based on assessment and historical data from chart review and collateral from the patients wife, the working diagnosis is delirium. Supporting this, [place] Cognitive Assessment (MOCA) was performed, which on writers rating, scored 15/30. Notably domains of visuospacial/executive, attention, delayed recall, and orientation were severely impaired, while domains of naming, language, and abstraction were unaffected (based on MOCA scoring technique).    Delirium could be explained by subacute inflammatory processes secondary to numerous physiological insults including right knee and hip replacement (October 2019/February 2020) with subsequent right thigh skin graft, acute kidney injury (had elevated creatinine on admission), hyperglycemia (serum glucose was 397 on this mornings lab work). Delirium could explain paranoid ideations, as well as visual hallucinations.     Patient did not appear agitated at this time and has not required treatment of agitation during this hospitalization. Recommend cautious use of CNS depressants during this hospitalization considering risks and benefits. Recommend encouraging natural circadian cycles via sleep hygiene, and appropriate light. 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    Based on assessment and historical data from chart review and collateral from the patients wife, the working diagnosis is delirium. Supporting this, [place] Cognitive Assessment (MoCA) was performed, which on writers rating, scored 15/30. Notably domains of visuospatial/executive, attention, delayed recall, and orientation were severely impaired, while domains of naming, language, and abstraction were unaffected (based on MoCA scoring technique).    Delirium could be explained by subacute inflammatory processes secondary to numerous physiological insults including right knee and hip replacement (October 2019/February 2020) with subsequent right thigh skin graft, acute kidney injury (had elevated creatinine on admission), hyperglycemia (serum glucose was 397 on this mornings lab work). Delirium could explain paranoid ideations, as well as visual hallucinations.     Patient did not appear agitated at this time and has not required treatment of agitation during this hospitalization. Recommend cautious use of CNS depressants during this hospitalization considering risks and benefits. Recommend encouraging natural circadian cycles via sleep hygiene, and appropriate light.

## 2020-02-22 NOTE — BEHAVIORAL HEALTH ASSESSMENT NOTE - NSBHCHARTREVIEWINVESTIGATE_PSY_A_CORE FT
12 Lead ECG (02.14.20 @ 14:46) >  QTC Calculation(Bezet) 439 ms  P Axis 60 degrees  R Axis -41 degrees  T Axis 64 degrees  Diagnosis Line Atrial-sensed ventricular-paced rhythm with occasional Premature ventricular  complexes  Abnormal ECG

## 2020-02-22 NOTE — BEHAVIORAL HEALTH ASSESSMENT NOTE - NSBHCHARTREVIEWVS_PSY_A_CORE FT
Vital Signs Last 24 Hrs  T(C): 35.9 (22 Feb 2020 12:25), Max: 36.6 (21 Feb 2020 20:17)  T(F): 96.6 (22 Feb 2020 12:25), Max: 97.9 (21 Feb 2020 20:17)  HR: 69 (22 Feb 2020 12:25) (69 - 74)  BP: 111/58 (22 Feb 2020 12:25) (95/50 - 114/59)  RR: 15 (22 Feb 2020 12:25) (15 - 18)  SpO2: 98% (22 Feb 2020 08:08) (98% - 98%)

## 2020-02-22 NOTE — BEHAVIORAL HEALTH ASSESSMENT NOTE - COMMENTS ON SUICIDE RISK/PROTECTIVE FACTORS:
Protective factors include social support from wife and daughter, future oriented towards returning to work, no history of suicidality or IPP admissions, no acute suicidality, access to emergency medical services and known to utilize them in times of distress

## 2020-02-23 LAB
-  AMIKACIN: SIGNIFICANT CHANGE UP
-  AMPICILLIN/SULBACTAM: SIGNIFICANT CHANGE UP
-  AMPICILLIN: SIGNIFICANT CHANGE UP
-  AZTREONAM: SIGNIFICANT CHANGE UP
-  CEFAZOLIN: SIGNIFICANT CHANGE UP
-  CEFEPIME: SIGNIFICANT CHANGE UP
-  CEFOXITIN: SIGNIFICANT CHANGE UP
-  CEFTRIAXONE: SIGNIFICANT CHANGE UP
-  CIPROFLOXACIN: SIGNIFICANT CHANGE UP
-  GENTAMICIN: SIGNIFICANT CHANGE UP
-  IMIPENEM: SIGNIFICANT CHANGE UP
-  LEVOFLOXACIN: SIGNIFICANT CHANGE UP
-  MEROPENEM: SIGNIFICANT CHANGE UP
-  NITROFURANTOIN: SIGNIFICANT CHANGE UP
-  PIPERACILLIN/TAZOBACTAM: SIGNIFICANT CHANGE UP
-  TIGECYCLINE: SIGNIFICANT CHANGE UP
-  TOBRAMYCIN: SIGNIFICANT CHANGE UP
-  TRIMETHOPRIM/SULFAMETHOXAZOLE: SIGNIFICANT CHANGE UP
ALBUMIN SERPL ELPH-MCNC: 3.5 G/DL — SIGNIFICANT CHANGE UP (ref 3.5–5.2)
ALP SERPL-CCNC: 238 U/L — HIGH (ref 30–115)
ALT FLD-CCNC: 20 U/L — SIGNIFICANT CHANGE UP (ref 0–41)
AMMONIA BLD-MCNC: 27 UMOL/L — SIGNIFICANT CHANGE UP (ref 11–55)
ANION GAP SERPL CALC-SCNC: 16 MMOL/L — HIGH (ref 7–14)
AST SERPL-CCNC: 30 U/L — SIGNIFICANT CHANGE UP (ref 0–41)
BASOPHILS # BLD AUTO: 0.1 K/UL — SIGNIFICANT CHANGE UP (ref 0–0.2)
BASOPHILS NFR BLD AUTO: 1.1 % — HIGH (ref 0–1)
BILIRUB SERPL-MCNC: 1.8 MG/DL — HIGH (ref 0.2–1.2)
BUN SERPL-MCNC: 34 MG/DL — HIGH (ref 10–20)
CALCIUM SERPL-MCNC: 9.2 MG/DL — SIGNIFICANT CHANGE UP (ref 8.5–10.1)
CHLORIDE SERPL-SCNC: 94 MMOL/L — LOW (ref 98–110)
CO2 SERPL-SCNC: 29 MMOL/L — SIGNIFICANT CHANGE UP (ref 17–32)
CREAT SERPL-MCNC: 1.2 MG/DL — SIGNIFICANT CHANGE UP (ref 0.7–1.5)
CULTURE RESULTS: SIGNIFICANT CHANGE UP
EOSINOPHIL # BLD AUTO: 0.48 K/UL — SIGNIFICANT CHANGE UP (ref 0–0.7)
EOSINOPHIL NFR BLD AUTO: 5.2 % — SIGNIFICANT CHANGE UP (ref 0–8)
GLUCOSE BLDC GLUCOMTR-MCNC: 111 MG/DL — HIGH (ref 70–99)
GLUCOSE BLDC GLUCOMTR-MCNC: 160 MG/DL — HIGH (ref 70–99)
GLUCOSE BLDC GLUCOMTR-MCNC: 183 MG/DL — HIGH (ref 70–99)
GLUCOSE BLDC GLUCOMTR-MCNC: 184 MG/DL — HIGH (ref 70–99)
GLUCOSE SERPL-MCNC: 137 MG/DL — HIGH (ref 70–99)
HCT VFR BLD CALC: 40.5 % — LOW (ref 42–52)
HGB BLD-MCNC: 12.4 G/DL — LOW (ref 14–18)
IMM GRANULOCYTES NFR BLD AUTO: 0.5 % — HIGH (ref 0.1–0.3)
LYMPHOCYTES # BLD AUTO: 1.05 K/UL — LOW (ref 1.2–3.4)
LYMPHOCYTES # BLD AUTO: 11.3 % — LOW (ref 20.5–51.1)
MAGNESIUM SERPL-MCNC: 2.4 MG/DL — SIGNIFICANT CHANGE UP (ref 1.8–2.4)
MCHC RBC-ENTMCNC: 22.4 PG — LOW (ref 27–31)
MCHC RBC-ENTMCNC: 30.6 G/DL — LOW (ref 32–37)
MCV RBC AUTO: 73.2 FL — LOW (ref 80–94)
METHOD TYPE: SIGNIFICANT CHANGE UP
MONOCYTES # BLD AUTO: 1.13 K/UL — HIGH (ref 0.1–0.6)
MONOCYTES NFR BLD AUTO: 12.2 % — HIGH (ref 1.7–9.3)
NEUTROPHILS # BLD AUTO: 6.46 K/UL — SIGNIFICANT CHANGE UP (ref 1.4–6.5)
NEUTROPHILS NFR BLD AUTO: 69.7 % — SIGNIFICANT CHANGE UP (ref 42.2–75.2)
NRBC # BLD: 0 /100 WBCS — SIGNIFICANT CHANGE UP (ref 0–0)
ORGANISM # SPEC MICROSCOPIC CNT: SIGNIFICANT CHANGE UP
ORGANISM # SPEC MICROSCOPIC CNT: SIGNIFICANT CHANGE UP
PLATELET # BLD AUTO: 274 K/UL — SIGNIFICANT CHANGE UP (ref 130–400)
POTASSIUM SERPL-MCNC: 3.8 MMOL/L — SIGNIFICANT CHANGE UP (ref 3.5–5)
POTASSIUM SERPL-SCNC: 3.8 MMOL/L — SIGNIFICANT CHANGE UP (ref 3.5–5)
PROT SERPL-MCNC: 6.8 G/DL — SIGNIFICANT CHANGE UP (ref 6–8)
RBC # BLD: 5.53 M/UL — SIGNIFICANT CHANGE UP (ref 4.7–6.1)
RBC # FLD: 23.5 % — HIGH (ref 11.5–14.5)
SODIUM SERPL-SCNC: 139 MMOL/L — SIGNIFICANT CHANGE UP (ref 135–146)
SPECIMEN SOURCE: SIGNIFICANT CHANGE UP
WBC # BLD: 9.27 K/UL — SIGNIFICANT CHANGE UP (ref 4.8–10.8)
WBC # FLD AUTO: 9.27 K/UL — SIGNIFICANT CHANGE UP (ref 4.8–10.8)

## 2020-02-23 PROCEDURE — 99222 1ST HOSP IP/OBS MODERATE 55: CPT

## 2020-02-23 PROCEDURE — 99233 SBSQ HOSP IP/OBS HIGH 50: CPT

## 2020-02-23 PROCEDURE — 97597 DBRDMT OPN WND 1ST 20 CM/<: CPT

## 2020-02-23 PROCEDURE — 99231 SBSQ HOSP IP/OBS SF/LOW 25: CPT | Mod: 25

## 2020-02-23 RX ORDER — BACITRACIN ZINC 500 UNIT/G
1 OINTMENT IN PACKET (EA) TOPICAL
Refills: 0 | Status: DISCONTINUED | OUTPATIENT
Start: 2020-02-23 | End: 2020-03-19

## 2020-02-23 RX ORDER — BACITRACIN ZINC 500 UNIT/G
1 OINTMENT IN PACKET (EA) TOPICAL
Refills: 0 | Status: DISCONTINUED | OUTPATIENT
Start: 2020-02-23 | End: 2020-02-23

## 2020-02-23 RX ADMIN — Medication 6 UNIT(S): at 17:29

## 2020-02-23 RX ADMIN — INSULIN GLARGINE 20 UNIT(S): 100 INJECTION, SOLUTION SUBCUTANEOUS at 21:42

## 2020-02-23 RX ADMIN — Medication 6 UNIT(S): at 07:51

## 2020-02-23 RX ADMIN — Medication 6 UNIT(S): at 11:42

## 2020-02-23 RX ADMIN — TAMSULOSIN HYDROCHLORIDE 0.4 MILLIGRAM(S): 0.4 CAPSULE ORAL at 21:37

## 2020-02-23 RX ADMIN — Medication 2: at 11:42

## 2020-02-23 RX ADMIN — Medication 81 MILLIGRAM(S): at 11:43

## 2020-02-23 RX ADMIN — Medication 2: at 17:28

## 2020-02-23 RX ADMIN — Medication 2: at 07:51

## 2020-02-23 RX ADMIN — HEPARIN SODIUM 5000 UNIT(S): 5000 INJECTION INTRAVENOUS; SUBCUTANEOUS at 21:42

## 2020-02-23 RX ADMIN — Medication 1 APPLICATION(S): at 17:47

## 2020-02-23 NOTE — PROGRESS NOTE ADULT - SUBJECTIVE AND OBJECTIVE BOX
LOIDA, JOHN  64y, Male  Allergy: ACE inhibitors (Hives)  enalapril (Hives)  Hospital Day: 2d      Patient was seen and examined at bedside. A&Ox3 states he wants to leave the hospital and needs to discuss with his wife. ROS difficult to obtain given tangential thinking       VITALS:  T(F): 98 (02-23-20 @ 05:00), Max: 98 (02-23-20 @ 05:00)  HR: 73 (02-23-20 @ 05:00)  BP: 109/59 (02-23-20 @ 05:00) (109/59 - 119/59)  RR: 19 (02-23-20 @ 05:00)  SpO2: 99% (02-23-20 @ 08:08)    PHYSICAL EXAM:  GENERAL: thin elderly male sitting in chair in [place] tearful A&Ox3 continues with tangential thinking   NECK: No evidence of swelling no palpable lymphadenopathy  CHEST/LUNG: clear to ausculation bilaterally no wheezes, rales, or rhonchi   HEART/VASC: RRR, No murmurs, rubs, or gallops appreciated, 2+ equal bilateral radial pulse  ABDOMEN: Soft, non tender non distended +bowel sounds in all quadrants, no palpable organomegaly   EXTREMITIES:  No clubbing and range of motion intact   : fields in place  TESTS & MEASUREMENTS:  Weight (Kg): 95.5 (02-21-20 @ 14:58)  BMI (kg/m2): 27.8 (02-21)    02-21-20 @ 07:01  -  02-22-20 @ 07:00  --------------------------------------------------------  IN: 0 mL / OUT: 1000 mL / NET: -1000 mL    02-22-20 @ 07:01  -  02-23-20 @ 07:00  --------------------------------------------------------  IN: 1100 mL / OUT: 1300 mL / NET: -200 mL                            12.4   9.27  )-----------( 274      ( 23 Feb 2020 05:33 )             40.5       02-23    139  |  94<L>  |  34<H>  ----------------------------<  137<H>  3.8   |  29  |  1.2    Ca    9.2      23 Feb 2020 05:33  Mg     2.4     02-23    TPro  6.8  /  Alb  3.5  /  TBili  1.8<H>  /  DBili  x   /  AST  30  /  ALT  20  /  AlkPhos  238<H>  02-23    LIVER FUNCTIONS - ( 23 Feb 2020 05:33 )  Alb: 3.5 g/dL / Pro: 6.8 g/dL / ALK PHOS: 238 U/L / ALT: 20 U/L / AST: 30 U/L / GGT: x                 Culture - Urine (collected 02-21-20 @ 01:20)  Source: .Urine Catheterized  Final Report (02-23-20 @ 07:45):    >100,000 CFU/ml Klebsiella pneumoniae  Organism: Klebsiella pneumoniae (02-23-20 @ 07:45)  Organism: Klebsiella pneumoniae (02-23-20 @ 07:45)      -  Amikacin: S <=16      -  Ampicillin: R >16 These ampicillin results predict results for amoxicillin      -  Ampicillin/Sulbactam: I 16/8 Enterobacter, Citrobacter, and Serratia may develop resistance during prolonged therapy (3-4 days)      -  Aztreonam: S <=4      -  Cefazolin: S <=8 (MIC_CL_COM_ENTERIC_CEFAZU) For uncomplicated UTI with K. pneumoniae, E. coli, or P. mirablis: YOLIE <=16 is sensitive and YOLIE >=32 is resistant. This also predicts results for oral agents cefaclor, cefdinir, cefpodoxime, cefprozil, cefuroxime axetil, cephalexin and locarbef for uncomplicated UTI. Note that some isolates may be susceptible to these agents while testing resistant to cefazolin.      -  Cefepime: S <=4      -  Cefoxitin: S <=8      -  Ceftriaxone: S <=1 Enterobacter, Citrobacter, and Serratia may develop resistance during prolonged therapy      -  Ciprofloxacin: S <=1      -  Gentamicin: S <=4      -  Imipenem: S <=1      -  Levofloxacin: S <=2      -  Meropenem: S <=1      -  Nitrofurantoin: S <=32 Should not be used to treat pyelonephritis      -  Piperacillin/Tazobactam: S <=16      -  Tigecycline: S <=2      -  Tobramycin: S <=4      -  Trimethoprim/Sulfamethoxazole: R >2/38      Method Type: YOLIE    Culture - Blood (collected 02-20-20 @ 23:47)  Source: .Blood Blood  Preliminary Report (02-22-20 @ 07:01):    No growth to date.    Culture - Blood (collected 02-20-20 @ 23:47)  Source: .Blood Blood  Preliminary Report (02-22-20 @ 07:01):    No growth to date.      MEDICATIONS:  MEDICATIONS  (STANDING):  aspirin enteric coated 81 milliGRAM(s) Oral daily  atorvastatin Oral Tab/Cap - Peds 40 milliGRAM(s) Oral daily  dextrose 5%. 1000 milliLiter(s) (50 mL/Hr) IV Continuous <Continuous>  dextrose 50% Injectable 12.5 Gram(s) IV Push once  dextrose 50% Injectable 25 Gram(s) IV Push once  dextrose 50% Injectable 25 Gram(s) IV Push once  digoxin     Tablet 0.125 milliGRAM(s) Oral daily  DULoxetine 90 milliGRAM(s) Oral daily  heparin  Injectable 5000 Unit(s) SubCutaneous every 8 hours  insulin glargine Injectable (LANTUS) 20 Unit(s) SubCutaneous at bedtime  insulin lispro (HumaLOG) corrective regimen sliding scale   SubCutaneous three times a day before meals  insulin lispro Injectable (HumaLOG) 6 Unit(s) SubCutaneous three times a day before meals  metoprolol succinate  milliGRAM(s) Oral daily  pantoprazole    Tablet 40 milliGRAM(s) Oral before breakfast  prasugrel 10 milliGRAM(s) Oral daily  tamsulosin Oral Tab/Cap - Peds 0.4 milliGRAM(s) Oral at bedtime    MEDICATIONS  (PRN):  acetaminophen   Tablet .. 650 milliGRAM(s) Oral every 6 hours PRN Temp greater or equal to 38C (100.4F), Moderate Pain (4 - 6)  dextrose 40% Gel 15 Gram(s) Oral once PRN Blood Glucose LESS THAN 70 milliGRAM(s)/deciliter  glucagon  Injectable 1 milliGRAM(s) IntraMuscular once PRN Glucose LESS THAN 70 milligrams/deciliter
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
## 2020-02-24 LAB
ANION GAP SERPL CALC-SCNC: 16 MMOL/L — HIGH (ref 7–14)
BUN SERPL-MCNC: 26 MG/DL — HIGH (ref 10–20)
CALCIUM SERPL-MCNC: 9.4 MG/DL — SIGNIFICANT CHANGE UP (ref 8.5–10.1)
CHLORIDE SERPL-SCNC: 94 MMOL/L — LOW (ref 98–110)
CO2 SERPL-SCNC: 27 MMOL/L — SIGNIFICANT CHANGE UP (ref 17–32)
CREAT SERPL-MCNC: 0.8 MG/DL — SIGNIFICANT CHANGE UP (ref 0.7–1.5)
FOLATE SERPL-MCNC: 5.1 NG/ML — SIGNIFICANT CHANGE UP
GLUCOSE BLDC GLUCOMTR-MCNC: 154 MG/DL — HIGH (ref 70–99)
GLUCOSE BLDC GLUCOMTR-MCNC: 163 MG/DL — HIGH (ref 70–99)
GLUCOSE BLDC GLUCOMTR-MCNC: 173 MG/DL — HIGH (ref 70–99)
GLUCOSE BLDC GLUCOMTR-MCNC: 174 MG/DL — HIGH (ref 70–99)
GLUCOSE BLDC GLUCOMTR-MCNC: 179 MG/DL — HIGH (ref 70–99)
GLUCOSE SERPL-MCNC: 179 MG/DL — HIGH (ref 70–99)
HCT VFR BLD CALC: 43.3 % — SIGNIFICANT CHANGE UP (ref 42–52)
HGB BLD-MCNC: 13 G/DL — LOW (ref 14–18)
MCHC RBC-ENTMCNC: 21.7 PG — LOW (ref 27–31)
MCHC RBC-ENTMCNC: 30 G/DL — LOW (ref 32–37)
MCV RBC AUTO: 72.2 FL — LOW (ref 80–94)
NRBC # BLD: 0 /100 WBCS — SIGNIFICANT CHANGE UP (ref 0–0)
PLATELET # BLD AUTO: 292 K/UL — SIGNIFICANT CHANGE UP (ref 130–400)
POTASSIUM SERPL-MCNC: 3.5 MMOL/L — SIGNIFICANT CHANGE UP (ref 3.5–5)
POTASSIUM SERPL-SCNC: 3.5 MMOL/L — SIGNIFICANT CHANGE UP (ref 3.5–5)
RBC # BLD: 6 M/UL — SIGNIFICANT CHANGE UP (ref 4.7–6.1)
RBC # FLD: 23.9 % — HIGH (ref 11.5–14.5)
SODIUM SERPL-SCNC: 137 MMOL/L — SIGNIFICANT CHANGE UP (ref 135–146)
T PALLIDUM AB TITR SER: NEGATIVE — SIGNIFICANT CHANGE UP
TSH SERPL-MCNC: 3.13 UIU/ML — SIGNIFICANT CHANGE UP (ref 0.27–4.2)
VIT B12 SERPL-MCNC: 1018 PG/ML — SIGNIFICANT CHANGE UP (ref 232–1245)
WBC # BLD: 8.38 K/UL — SIGNIFICANT CHANGE UP (ref 4.8–10.8)
WBC # FLD AUTO: 8.38 K/UL — SIGNIFICANT CHANGE UP (ref 4.8–10.8)

## 2020-02-24 PROCEDURE — 76770 US EXAM ABDO BACK WALL COMP: CPT | Mod: 26

## 2020-02-24 PROCEDURE — 99233 SBSQ HOSP IP/OBS HIGH 50: CPT

## 2020-02-24 PROCEDURE — 99232 SBSQ HOSP IP/OBS MODERATE 35: CPT | Mod: GC

## 2020-02-24 RX ADMIN — PANTOPRAZOLE SODIUM 40 MILLIGRAM(S): 20 TABLET, DELAYED RELEASE ORAL at 05:43

## 2020-02-24 RX ADMIN — TAMSULOSIN HYDROCHLORIDE 0.4 MILLIGRAM(S): 0.4 CAPSULE ORAL at 21:58

## 2020-02-24 RX ADMIN — Medication 6 UNIT(S): at 17:14

## 2020-02-24 RX ADMIN — ATORVASTATIN CALCIUM 40 MILLIGRAM(S): 80 TABLET, FILM COATED ORAL at 12:26

## 2020-02-24 RX ADMIN — INSULIN GLARGINE 20 UNIT(S): 100 INJECTION, SOLUTION SUBCUTANEOUS at 21:58

## 2020-02-24 RX ADMIN — Medication 81 MILLIGRAM(S): at 12:26

## 2020-02-24 RX ADMIN — Medication 0.12 MILLIGRAM(S): at 05:43

## 2020-02-24 RX ADMIN — Medication 2: at 08:10

## 2020-02-24 RX ADMIN — Medication 1 APPLICATION(S): at 05:43

## 2020-02-24 RX ADMIN — HEPARIN SODIUM 5000 UNIT(S): 5000 INJECTION INTRAVENOUS; SUBCUTANEOUS at 13:06

## 2020-02-24 RX ADMIN — PRASUGREL 10 MILLIGRAM(S): 5 TABLET, FILM COATED ORAL at 12:26

## 2020-02-24 RX ADMIN — Medication 1 APPLICATION(S): at 17:15

## 2020-02-24 RX ADMIN — Medication 150 MILLIGRAM(S): at 05:43

## 2020-02-24 RX ADMIN — Medication 2: at 12:25

## 2020-02-24 RX ADMIN — Medication 2: at 17:15

## 2020-02-24 RX ADMIN — HEPARIN SODIUM 5000 UNIT(S): 5000 INJECTION INTRAVENOUS; SUBCUTANEOUS at 05:43

## 2020-02-24 RX ADMIN — Medication 6 UNIT(S): at 12:25

## 2020-02-24 RX ADMIN — Medication 6 UNIT(S): at 08:10

## 2020-02-24 RX ADMIN — HEPARIN SODIUM 5000 UNIT(S): 5000 INJECTION INTRAVENOUS; SUBCUTANEOUS at 21:58

## 2020-02-24 RX ADMIN — DULOXETINE HYDROCHLORIDE 90 MILLIGRAM(S): 30 CAPSULE, DELAYED RELEASE ORAL at 12:26

## 2020-02-24 NOTE — PROGRESS NOTE BEHAVIORAL HEALTH - NSBHFUPINTERVALHXFT_PSY_A_CORE
Overnight there were no events. Staff report the pt slept through the night, has been eating appropriately, has not been agitated or aggressive, they do reports intermittent periods of confusion where the pt will try to leave but he is easily redirectable and will get back in bed when asked. The medical resident reports that the pt has had waxing and waning mental status and has been treated medically for metabolic derangements, which has led to daily improvements of his mental status. Today the medical resident reports the pt was fully oriented. During the interview today the pt was much more oriented than on initial presentation. While slow to answer he was correctly able to identify his location, the year and month, his wife, and the president. When asked who the previous president was, he took about 60 seconds to answer correctly, during which time he became frustrated and began crying. When asked to spell words backwards he was unable to sdo so and started crying. When asked why he was upset he replied that he got upset over his crying spells that he cant control, his intermittent confusion, his limited mobility, and his medical issues which he began to list. When asked how he is feeling he says "better than a couple days ago". He reports feeling vastly improved compared to a few days ago, and somewhat better compared to yesterday. While he finds his progress slow, he is happy he is progressing in the right direction. He denies thoughts of hurting or killing himself, thoughts of hurting or killing others, hearing voices, or seeing things that are not there.      Collateral from wife obtained at bedside. She reports that the pt looks "much better". She describes this as the pt being able to hold a conversation, being calm and cooperative, and that he is getting better every day. She has no concerns as she has seen a vast improvement of his mental status.

## 2020-02-24 NOTE — DIETITIAN INITIAL EVALUATION ADULT. - PHYSICAL APPEARANCE
overweight/confused. BMI: 27.8, IBW: 184#, UBW: pt states he was ~235# earlier in the month and now is 210#, however per prev adm pt stated UBW is between 113 - 118kg--?accuracy of pt report. no edema noted, stage 2 pressure injury to sacral spine. Unable to conduct nutrition focused physical exam at this time as pt covered in blankets and unwilling to participate in RD assessment.
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
## 2020-02-24 NOTE — PROGRESS NOTE BEHAVIORAL HEALTH - NSBHCONSULTFOLLOWAFTERCARE_PSY_A_CORE FT
Recommend referral to [facility] via social work. Recommend referral to [facility] [address] [place] [address], Phone number : [phone] if patient is agreeable

## 2020-02-24 NOTE — PHYSICAL THERAPY INITIAL EVALUATION ADULT - LEVEL OF INDEPENDENCE: GAIT, REHAB EVAL
unable to perform/pt stood for ~ 7 seconds, reported dizziness and swaying and had to sit down, BP was taken wt ERROR message in sitting . Pt returned to supine with ERROR message for BP then pt placed in trendeleberg with BP R /55mmHg, HR 78bpm, When attempt to stand again pt was lightheaded again, ERROR BP reading, in supine /56mmHg, HR 81 bpm. Courtney VICK notified who notified MD.

## 2020-02-24 NOTE — DIETITIAN INITIAL EVALUATION ADULT. - NAME AND PHONE
RD to monitor diet order, energy intake, body composition, glucose profile, nutrition focused physical findings

## 2020-02-24 NOTE — DIETITIAN INITIAL EVALUATION ADULT. - ENERGY NEEDS
Calories: 4406-0709 kcal/day (MSJ x 1.2-1.3 AF)  Protein:  gm/day (1-1.2 gm/kg CBW)  Fluid: 1L per LIP

## 2020-02-24 NOTE — PHYSICAL THERAPY INITIAL EVALUATION ADULT - RANGE OF MOTION EXAMINATION, REHAB EVAL
B UE's grossly WFL. R LE didn't range R LE actively, pt seen to have lagging ext ~ 10 *. L LE grossly WFL.
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
## 2020-02-24 NOTE — PROGRESS NOTE BEHAVIORAL HEALTH - CASE SUMMARY
Mr Blas is a 64 year old man with no history of a psychiatric illness but multiple medical problems who was admitted to the medical floor for altered mental status. psychiatry consult was called for altered mental status.   Patient appears less delirious today than described earlier. He is not agitated and seems to be in good behavioral control. He does not appear acutely psychotic , manic or depressed. He denies suicidal ideations, intent or plan. At this time, patient is not considered an imminent danger to himself or others and does not need inpatient psychiatric hospitalization. We recommend behavioral interventions and environmental modifications to help patient's orientation and help him feel safe. We also recommend [facility] for supportive psychotherapy at home if patient is agreeable.
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
## 2020-02-24 NOTE — PHYSICAL THERAPY INITIAL EVALUATION ADULT - MANUAL MUSCLE TESTING RESULTS, REHAB EVAL
B UE's at least 3/5. R LE: hip flex at least 3/5, knee flex/ext NT 2* to awaiting burn clearance regarding AROM

## 2020-02-24 NOTE — CONSULT NOTE ADULT - ASSESSMENT
IMPRESSION: Rehab of R hip FX SP ORIF altered mental status    PRECAUTIONS: [   x ] Cardiac  [    ] Respiratory  [    ] Seizures [    ] Contact Isolation  [    ] Droplet Isolation  [    ] Other    Weight Bearing Status: WBAT, clarify with burn service ROM To R Knee    RECOMMENDATION:    Out of Bed to Chair     DVT/Decubiti Prophylaxis    REHAB PLAN:     [ x    ] Bedside P/T 3-5 times a week   [     ] Bedside O/T  2-3 times a week   [     ] No Rehab Therapy Indicated   [     ]  Speech Therapy   Conditioning/ROM                                 ADL  Bed Mobility                                            Conditioning/ROM  Transfers                                                  Bed Mobility  Sitting /Standing Balance                      Transfers                                        Gait Training                                            Sitting/Standing Balance  Stair Training [   ]Applicable                 Home equipment Eval                                                                     Splinting  [   ] Only      GOALS:   ADL   [    x]   Independent         Transfers  [    x] Independent            Ambulation  [x     ] Independent     [   x  ] With device                            [    ]  CG                                               [    ]  CG                                                    [     ] CG                            [    ] Min A                                          [    ] Min A                                                [     ] Min  A          DISCHARGE PLAN:   [ x    ]  Good candidate for Intensive Rehabilitation/Hospital based                                             Will tolerate 3hrs Intensive Rehab Daily                                       [      ]  Short Term Rehab in Skilled Nursing Facility                                       [      ]  Home with Outpatient or  services                                         [      ]  Possible Candidate for Intensive Hospital based Rehab
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
## 2020-02-24 NOTE — PHYSICAL THERAPY INITIAL EVALUATION ADULT - CRITERIA FOR SKILLED THERAPEUTIC INTERVENTIONS
predicted duration of therapy intervention/therapy frequency/functional limitations in following categories/rehab potential/risk reduction/prevention/impairments found

## 2020-02-24 NOTE — PROGRESS NOTE BEHAVIORAL HEALTH - SUMMARY
64-year-old, male, domiciled in [facility] with wife, father of adult daughter, employed as EMS manager, extensive past medical history with notable hospitalization January 31 to February 14, 2020 for fall with right hip fracture, with course complicated by delirium, discharged to [facility], readmitted on Feb 21, 2020 for altered mental status.    Based on assessment and historical data from chart review and collateral from the patients wife, the working diagnosis is delirium. Supporting this, [place] Cognitive Assessment (MoCA) was performed, which on writers rating, scored 15/30. Notably domains of visuospatial/executive, attention, delayed recall, and orientation were severely impaired, while domains of naming, language, and abstraction were unaffected (based on MoCA scoring technique).    Delirium could be explained by subacute inflammatory processes secondary to numerous physiological insults including right knee and hip replacement (October 2019/February 2020) with subsequent right thigh skin graft, acute kidney injury (had elevated creatinine on admission), hyperglycemia (serum glucose was 397 on this mornings lab work). Delirium could explain paranoid ideations, as well as visual hallucinations.     Patient did not appear agitated at this time and has not required treatment of agitation during this hospitalization. Recommend cautious use of CNS depressants during this hospitalization considering risks and benefits. Recommend encouraging natural circadian cycles via sleep hygiene, and appropriate light.

## 2020-02-24 NOTE — DIETITIAN INITIAL EVALUATION ADULT. - ADD RECOMMEND
1. Change diet to CHO Consistent, DASH/TLC, Gluten-free, 2. Order Ensure Compact BID, 3. Consider daily MVI 1. Change diet to CHO Consistent, DASH/TLC, Gluten-free + 1L fluid restriction , 2. Order Ensure Compact BID, 3. Consider daily MVI
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
## 2020-02-24 NOTE — PROGRESS NOTE BEHAVIORAL HEALTH - NSBHCHARTREVIEWIMAGING_PSY_A_CORE FT
< from: CT Head No Cont (02.21.20 @ 02:26) >    IMPRESSION:     1.  No acute intra-cranial pathology.    2.  Moderate chronic microvascular ischemic changes.
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
## 2020-02-24 NOTE — PHYSICAL THERAPY INITIAL EVALUATION ADULT - GENERAL OBSERVATIONS, REHAB EVAL
15:120-15:56. Pt encountered semifowler in bed in NAD, + dressing R LE/ACE bandage, + fields, family at b/s. Pt agreeable to PT. 15:20-15:56. Pt encountered semifowler in bed in NAD, + dressing R LE/ACE bandage, +staples R lateral thigh intact, fields, family at b/s. Pt agreeable to PT.

## 2020-02-24 NOTE — DIETITIAN INITIAL EVALUATION ADULT. - OTHER INFO
Nutrition Hx PTA: Unable to obtain thorough nutrition hx at this time as pt seems to be confused and keeps saying he eats fine; uninterested in participating in conversation with RD at this time. Will reattempt at f/u. NKFA per pt.     Pt admitted with metabolic encephalopathy, which is improving--seen by ID, recommended to monitor off antibiotics at this time with low suspicion for UTI as cause of encephalopathy. Per psych eval, no acute psychiatric illness; avoid sedating agents and opioid pain medications at this time. Neuro consult: f/u TSH, EEG.

## 2020-02-24 NOTE — PROGRESS NOTE BEHAVIORAL HEALTH - NSBHCHARTREVIEWLAB_PSY_A_CORE FT
13.0   8.38  )-----------( 292      ( 24 Feb 2020 05:56 )             43.3     02-24    137  |  94<L>  |  26<H>  ----------------------------<  179<H>  3.5   |  27  |  0.8    Ca    9.4      24 Feb 2020 05:56  Mg     2.4     02-23    TPro  6.8  /  Alb  3.5  /  TBili  1.8<H>  /  DBili  x   /  AST  30  /  ALT  20  /  AlkPhos  238<H>  02-23

## 2020-02-25 LAB
ANION GAP SERPL CALC-SCNC: 11 MMOL/L — SIGNIFICANT CHANGE UP (ref 7–14)
BUN SERPL-MCNC: 26 MG/DL — HIGH (ref 10–20)
CALCIUM SERPL-MCNC: 9.3 MG/DL — SIGNIFICANT CHANGE UP (ref 8.5–10.1)
CHLORIDE SERPL-SCNC: 95 MMOL/L — LOW (ref 98–110)
CO2 SERPL-SCNC: 29 MMOL/L — SIGNIFICANT CHANGE UP (ref 17–32)
CREAT SERPL-MCNC: 0.8 MG/DL — SIGNIFICANT CHANGE UP (ref 0.7–1.5)
GLUCOSE BLDC GLUCOMTR-MCNC: 153 MG/DL — HIGH (ref 70–99)
GLUCOSE BLDC GLUCOMTR-MCNC: 168 MG/DL — HIGH (ref 70–99)
GLUCOSE BLDC GLUCOMTR-MCNC: 186 MG/DL — HIGH (ref 70–99)
GLUCOSE BLDC GLUCOMTR-MCNC: 203 MG/DL — HIGH (ref 70–99)
GLUCOSE SERPL-MCNC: 226 MG/DL — HIGH (ref 70–99)
HCT VFR BLD CALC: 41.7 % — LOW (ref 42–52)
HGB BLD-MCNC: 12.3 G/DL — LOW (ref 14–18)
MAGNESIUM SERPL-MCNC: 2 MG/DL — SIGNIFICANT CHANGE UP (ref 1.8–2.4)
MCHC RBC-ENTMCNC: 21.7 PG — LOW (ref 27–31)
MCHC RBC-ENTMCNC: 29.5 G/DL — LOW (ref 32–37)
MCV RBC AUTO: 73.4 FL — LOW (ref 80–94)
NRBC # BLD: 0 /100 WBCS — SIGNIFICANT CHANGE UP (ref 0–0)
PLATELET # BLD AUTO: 298 K/UL — SIGNIFICANT CHANGE UP (ref 130–400)
POTASSIUM SERPL-MCNC: 3.9 MMOL/L — SIGNIFICANT CHANGE UP (ref 3.5–5)
POTASSIUM SERPL-SCNC: 3.9 MMOL/L — SIGNIFICANT CHANGE UP (ref 3.5–5)
RBC # BLD: 5.68 M/UL — SIGNIFICANT CHANGE UP (ref 4.7–6.1)
RBC # FLD: 24 % — HIGH (ref 11.5–14.5)
SODIUM SERPL-SCNC: 135 MMOL/L — SIGNIFICANT CHANGE UP (ref 135–146)
WBC # BLD: 9.74 K/UL — SIGNIFICANT CHANGE UP (ref 4.8–10.8)
WBC # FLD AUTO: 9.74 K/UL — SIGNIFICANT CHANGE UP (ref 4.8–10.8)

## 2020-02-25 PROCEDURE — 99233 SBSQ HOSP IP/OBS HIGH 50: CPT

## 2020-02-25 RX ORDER — CHLORHEXIDINE GLUCONATE 213 G/1000ML
1 SOLUTION TOPICAL
Refills: 0 | Status: DISCONTINUED | OUTPATIENT
Start: 2020-02-25 | End: 2020-03-19

## 2020-02-25 RX ORDER — PANTOPRAZOLE SODIUM 20 MG/1
40 TABLET, DELAYED RELEASE ORAL AT BEDTIME
Refills: 0 | Status: DISCONTINUED | OUTPATIENT
Start: 2020-02-25 | End: 2020-03-19

## 2020-02-25 RX ORDER — CEFTRIAXONE 500 MG/1
1000 INJECTION, POWDER, FOR SOLUTION INTRAMUSCULAR; INTRAVENOUS ONCE
Refills: 0 | Status: COMPLETED | OUTPATIENT
Start: 2020-02-25 | End: 2020-02-25

## 2020-02-25 RX ORDER — CEFTRIAXONE 500 MG/1
1000 INJECTION, POWDER, FOR SOLUTION INTRAMUSCULAR; INTRAVENOUS EVERY 24 HOURS
Refills: 0 | Status: DISCONTINUED | OUTPATIENT
Start: 2020-02-26 | End: 2020-02-28

## 2020-02-25 RX ORDER — CEFTRIAXONE 500 MG/1
INJECTION, POWDER, FOR SOLUTION INTRAMUSCULAR; INTRAVENOUS
Refills: 0 | Status: DISCONTINUED | OUTPATIENT
Start: 2020-02-25 | End: 2020-02-28

## 2020-02-25 RX ADMIN — PANTOPRAZOLE SODIUM 40 MILLIGRAM(S): 20 TABLET, DELAYED RELEASE ORAL at 01:09

## 2020-02-25 RX ADMIN — ATORVASTATIN CALCIUM 40 MILLIGRAM(S): 80 TABLET, FILM COATED ORAL at 11:06

## 2020-02-25 RX ADMIN — HEPARIN SODIUM 5000 UNIT(S): 5000 INJECTION INTRAVENOUS; SUBCUTANEOUS at 13:26

## 2020-02-25 RX ADMIN — CEFTRIAXONE 100 MILLIGRAM(S): 500 INJECTION, POWDER, FOR SOLUTION INTRAMUSCULAR; INTRAVENOUS at 11:07

## 2020-02-25 RX ADMIN — DULOXETINE HYDROCHLORIDE 90 MILLIGRAM(S): 30 CAPSULE, DELAYED RELEASE ORAL at 11:07

## 2020-02-25 RX ADMIN — HEPARIN SODIUM 5000 UNIT(S): 5000 INJECTION INTRAVENOUS; SUBCUTANEOUS at 06:07

## 2020-02-25 RX ADMIN — Medication 6 UNIT(S): at 07:49

## 2020-02-25 RX ADMIN — PANTOPRAZOLE SODIUM 40 MILLIGRAM(S): 20 TABLET, DELAYED RELEASE ORAL at 21:47

## 2020-02-25 RX ADMIN — Medication 650 MILLIGRAM(S): at 01:08

## 2020-02-25 RX ADMIN — Medication 2: at 11:53

## 2020-02-25 RX ADMIN — HEPARIN SODIUM 5000 UNIT(S): 5000 INJECTION INTRAVENOUS; SUBCUTANEOUS at 21:47

## 2020-02-25 RX ADMIN — INSULIN GLARGINE 20 UNIT(S): 100 INJECTION, SOLUTION SUBCUTANEOUS at 21:46

## 2020-02-25 RX ADMIN — Medication 6 UNIT(S): at 17:11

## 2020-02-25 RX ADMIN — Medication 1 APPLICATION(S): at 17:12

## 2020-02-25 RX ADMIN — PRASUGREL 10 MILLIGRAM(S): 5 TABLET, FILM COATED ORAL at 11:06

## 2020-02-25 RX ADMIN — Medication 0.12 MILLIGRAM(S): at 06:07

## 2020-02-25 RX ADMIN — Medication 6 UNIT(S): at 11:52

## 2020-02-25 RX ADMIN — Medication 650 MILLIGRAM(S): at 01:38

## 2020-02-25 RX ADMIN — Medication 81 MILLIGRAM(S): at 11:07

## 2020-02-25 RX ADMIN — Medication 1 APPLICATION(S): at 06:06

## 2020-02-25 RX ADMIN — Medication 2: at 17:12

## 2020-02-25 RX ADMIN — TAMSULOSIN HYDROCHLORIDE 0.4 MILLIGRAM(S): 0.4 CAPSULE ORAL at 21:47

## 2020-02-25 RX ADMIN — Medication 150 MILLIGRAM(S): at 06:07

## 2020-02-25 RX ADMIN — Medication 4: at 07:48

## 2020-02-25 NOTE — PROGRESS NOTE ADULT - SUBJECTIVE AND OBJECTIVE BOX
FLOWER MARISCAL  64y  Male      Patient is a 64y old  Male who presents with a chief complaint of Altered mental status (25 Feb 2020 11:08)      INTERVAL HPI/OVERNIGHT EVENTS:      ******************************* REVIEW OF SYSTEMS:**********************************************    resting in bed. No acute distress.   All other review of systems negative    *********************** VITALS ******************************************    T(F): 98 (02-25-20 @ 04:40)  HR: 81 (02-25-20 @ 04:40) (68 - 81)  BP: 113/72 (02-25-20 @ 04:40) (95/54 - 113/72)  RR: 18 (02-25-20 @ 04:40) (18 - 18)  SpO2: 97% (02-25-20 @ 08:18) (97% - 97%)    02-24-20 @ 07:01  -  02-25-20 @ 07:00  --------------------------------------------------------  IN: 0 mL / OUT: 350 mL / NET: -350 mL    02-25-20 @ 07:01  -  02-25-20 @ 11:36  --------------------------------------------------------  IN: 220 mL / OUT: 400 mL / NET: -180 mL            02-24-20 @ 07:01  -  02-25-20 @ 07:00  --------------------------------------------------------  IN: 0 mL / OUT: 350 mL / NET: -350 mL    02-25-20 @ 07:01  -  02-25-20 @ 11:36  --------------------------------------------------------  IN: 220 mL / OUT: 400 mL / NET: -180 mL        ******************************** PHYSICAL EXAM:**************************************************  GENERAL: NAD    PSYCH: no agitation, baseline mentation  HEENT:     NERVOUS SYSTEM:  Alert & Oriented X2-3  PULMONARY: CRYSTAL, CTA    CARDIOVASCULAR: S1S2 RRR    GI: Soft, NT, ND; BS present.    EXTREMITIES:  2+ Peripheral Pulses, No clubbing, cyanosis, or edema    LYMPH: No lymphadenopathy noted    SKIN: No rashes or lesions    ******************************************************************************************      **************************** LABS *******************************************************                          12.3   9.74  )-----------( 298      ( 25 Feb 2020 06:59 )             41.7     02-25    135  |  95<L>  |  26<H>  ----------------------------<  226<H>  3.9   |  29  |  0.8    Ca    9.3      25 Feb 2020 06:59  Mg     2.0     02-25            Lactate Trend  02-21 @ 11:15 Lactate:1.2   02-20 @ 23:47 Lactate:2.4         CAPILLARY BLOOD GLUCOSE      POCT Blood Glucose.: 203 mg/dL (25 Feb 2020 07:35)          **************************Active Medications *******************************************  ACE inhibitors (Hives)  enalapril (Hives)      acetaminophen   Tablet .. 650 milliGRAM(s) Oral every 6 hours PRN  aspirin enteric coated 81 milliGRAM(s) Oral daily  atorvastatin Oral Tab/Cap - Peds 40 milliGRAM(s) Oral daily  BACItracin   Ointment 1 Application(s) Topical two times a day  cefTRIAXone   IVPB      chlorhexidine 4% Liquid 1 Application(s) Topical <User Schedule>  dextrose 40% Gel 15 Gram(s) Oral once PRN  dextrose 5%. 1000 milliLiter(s) IV Continuous <Continuous>  dextrose 50% Injectable 12.5 Gram(s) IV Push once  dextrose 50% Injectable 25 Gram(s) IV Push once  dextrose 50% Injectable 25 Gram(s) IV Push once  digoxin     Tablet 0.125 milliGRAM(s) Oral daily  DULoxetine 90 milliGRAM(s) Oral daily  glucagon  Injectable 1 milliGRAM(s) IntraMuscular once PRN  heparin  Injectable 5000 Unit(s) SubCutaneous every 8 hours  insulin glargine Injectable (LANTUS) 20 Unit(s) SubCutaneous at bedtime  insulin lispro (HumaLOG) corrective regimen sliding scale   SubCutaneous three times a day before meals  insulin lispro Injectable (HumaLOG) 6 Unit(s) SubCutaneous three times a day before meals  metoprolol succinate  milliGRAM(s) Oral daily  pantoprazole    Tablet 40 milliGRAM(s) Oral at bedtime  prasugrel 10 milliGRAM(s) Oral daily  tamsulosin Oral Tab/Cap - Peds 0.4 milliGRAM(s) Oral at bedtime      ***************************************************  RADIOLOGY & ADDITIONAL TESTS:    Imaging Personally Reviewed:  [ ] YES  [ ] NO    HEALTH ISSUES - PROBLEM Dx:  Encephalopathy, metabolic  Delirium due to medical condition without behavioral disturbance  Delirium

## 2020-02-25 NOTE — PROGRESS NOTE ADULT - SUBJECTIVE AND OBJECTIVE BOX
HPI:  [64 year old gentleman]    CC: Altered mental status     PM/SH: HTN, DLD, CAD s/p CABG, HFrEF (25-30% on ECHO 2/2020, 20-25% on MUGA scan in 12/2019) s/p st carlyle BiV placement 2/2020, DM (on insulin pump), pulmonary hypertension, Celiac disease, diverticulitis, s/p right total knee replacement October 2019 complicated by infection s/p abx course w/ PICC and  right thigh with skin graft, right hip fracture s/p ORIF 2/2020     History of Present Illness goes back to the day of admission. The patient as send here from NH where he was for rehab after his recent ORIF. Per the records, patient was agitated and combative with staff. Patient was claiming "there was a person sneaking around by his room and he wanted the staff from NH to call the Police." Patient also stated he was scared so he was screaming.    Per the patient he does not know exactly what happened. He said he realized he was acting weird but was not aware of why. He said he got into confrontations with the nursing staff, the police and the EMS because they were being aggressive towards him.   Patient otherwise denies any hallucination/ HA/dizziness/chest pain/sob/abd pain/n/v/d.    In the ED, vitals were stable. Labs showed elevated WBC (it has been before). Na was 130 (became low after last admission), K was 5.2, creatinine was 1.9 from 0.8. Lactate was 2.4. UA was grossly positive. Per the ED team and signout and patient fields was changed and sample was from urine. Per their notes, the patient refused fields removal. (21 Feb 2020 04:11)    Currently admitted to medicine with the primary diagnosis of Metabolic encephalopathy     Today is hospital day 4d.     INTERVAL HPI / OVERNIGHT EVENTS:  Patient was examined and seen at bedside. This morning he is resting comfortably in bed and reports no new issues or overnight events. Patient is slow to respond but has answered my questions correctly. Mentation seems improved today. Denied any cp or shortness of breath or abdominal pain.    ROS: Otherwise unremarkable     PAST MEDICAL & SURGICAL HISTORY  Diabetic neuropathy  STEMI (ST elevation myocardial infarction)  Diverticulitis  MRSA bacteremia  History of celiac disease  CHF (congestive heart failure): EF ~ 25%  HTN (hypertension)  Diabetes: on insulin pump  Blood clot due to device, implant, or graft: was on blood thinners  HLD (hyperlipidemia)  Osteoarthritis  Atherosclerosis of coronary artery: CAD (coronary artery disease)  Status post percutaneous transluminal coronary angioplasty: in 2012  History of open reduction and internal fixation (ORIF) procedure  Surgery, elective: Right shoulder  Surgery, elective: right knee wound debridement  S/P TKR (total knee replacement), right: with infection Mrsa   per pt he was cleared from MRSA infection  S/P CABG x 1: 2018  Stented coronary artery: 10/18 heart attack  INFERIOR WALL MI  Other postprocedural status: Fixation hardware in foot LEFT    ALLERGIES  ACE inhibitors (Hives)  enalapril (Hives)    MEDICATIONS  STANDING MEDICATIONS  aspirin enteric coated 81 milliGRAM(s) Oral daily  atorvastatin Oral Tab/Cap - Peds 40 milliGRAM(s) Oral daily  BACItracin   Ointment 1 Application(s) Topical two times a day  dextrose 5%. 1000 milliLiter(s) IV Continuous <Continuous>  dextrose 50% Injectable 12.5 Gram(s) IV Push once  dextrose 50% Injectable 25 Gram(s) IV Push once  dextrose 50% Injectable 25 Gram(s) IV Push once  digoxin     Tablet 0.125 milliGRAM(s) Oral daily  DULoxetine 90 milliGRAM(s) Oral daily  heparin  Injectable 5000 Unit(s) SubCutaneous every 8 hours  insulin glargine Injectable (LANTUS) 20 Unit(s) SubCutaneous at bedtime  insulin lispro (HumaLOG) corrective regimen sliding scale   SubCutaneous three times a day before meals  insulin lispro Injectable (HumaLOG) 6 Unit(s) SubCutaneous three times a day before meals  metoprolol succinate  milliGRAM(s) Oral daily  pantoprazole    Tablet 40 milliGRAM(s) Oral before breakfast  prasugrel 10 milliGRAM(s) Oral daily  tamsulosin Oral Tab/Cap - Peds 0.4 milliGRAM(s) Oral at bedtime    PRN MEDICATIONS  acetaminophen   Tablet .. 650 milliGRAM(s) Oral every 6 hours PRN  dextrose 40% Gel 15 Gram(s) Oral once PRN  glucagon  Injectable 1 milliGRAM(s) IntraMuscular once PRN    VITALS:  T(F): 98.3  HR: 88  BP: 112/63  RR: 18  SpO2: 98%    PHYSICAL EXAM  GEN: NAD, Resting comfortably in bed  PULM: Clear to auscultation bilaterally, No wheezes  CVS: Regular rate and rhythm, S1-S2  ABD: Soft, non-tender, non-distended, no guarding  EXT: No edema  NEURO: AAOx3, slow mentation still    LABS                        13.0   8.38  )-----------( 292      ( 24 Feb 2020 05:56 )             43.3     02-24    137  |  94<L>  |  26<H>  ----------------------------<  179<H>  3.5   |  27  |  0.8    Ca    9.4      24 Feb 2020 05:56  Mg     2.4     02-23    TPro  6.8  /  Alb  3.5  /  TBili  1.8<H>  /  DBili  x   /  AST  30  /  ALT  20  /  AlkPhos  238<H>  02-23      RADIOLOGY    < from: US Kidney and Bladder (02.21.20 @ 09:44) >  IMPRESSION:  Mild left hydronephrosis without any obvious stones. Consider a CT for further evaluation.    < end of copied text >    < from: CT Head No Cont (02.21.20 @ 02:26) >  IMPRESSION:     1.  No acute intra-cranial pathology.    2.  Moderate chronic microvascular ischemic changes.     < end of copied text >    < from: Xray Chest 1 View- PORTABLE-Urgent (02.21.20 @ 01:16) >  Impression:      No radiographic evidence of acute cardiopulmonary disease.    < end of copied text >      < from: EEG (02.24.20 @ 12:00) >  Impression  -  Abnormal due to the presence of: generalized slowing as above    < end of copied text >
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
## 2020-02-26 LAB
ALBUMIN SERPL ELPH-MCNC: 3.5 G/DL — SIGNIFICANT CHANGE UP (ref 3.5–5.2)
ALP SERPL-CCNC: 223 U/L — HIGH (ref 30–115)
ALT FLD-CCNC: 16 U/L — SIGNIFICANT CHANGE UP (ref 0–41)
ANION GAP SERPL CALC-SCNC: 15 MMOL/L — HIGH (ref 7–14)
ANION GAP SERPL CALC-SCNC: 16 MMOL/L — HIGH (ref 7–14)
AST SERPL-CCNC: 23 U/L — SIGNIFICANT CHANGE UP (ref 0–41)
BILIRUB SERPL-MCNC: 2.1 MG/DL — HIGH (ref 0.2–1.2)
BUN SERPL-MCNC: 20 MG/DL — SIGNIFICANT CHANGE UP (ref 10–20)
BUN SERPL-MCNC: 23 MG/DL — HIGH (ref 10–20)
CALCIUM SERPL-MCNC: 9.3 MG/DL — SIGNIFICANT CHANGE UP (ref 8.5–10.1)
CALCIUM SERPL-MCNC: 9.5 MG/DL — SIGNIFICANT CHANGE UP (ref 8.5–10.1)
CHLORIDE SERPL-SCNC: 94 MMOL/L — LOW (ref 98–110)
CHLORIDE SERPL-SCNC: 95 MMOL/L — LOW (ref 98–110)
CO2 SERPL-SCNC: 26 MMOL/L — SIGNIFICANT CHANGE UP (ref 17–32)
CO2 SERPL-SCNC: 28 MMOL/L — SIGNIFICANT CHANGE UP (ref 17–32)
CREAT SERPL-MCNC: 0.6 MG/DL — LOW (ref 0.7–1.5)
CREAT SERPL-MCNC: 0.9 MG/DL — SIGNIFICANT CHANGE UP (ref 0.7–1.5)
CULTURE RESULTS: SIGNIFICANT CHANGE UP
CULTURE RESULTS: SIGNIFICANT CHANGE UP
GLUCOSE BLDC GLUCOMTR-MCNC: 144 MG/DL — HIGH (ref 70–99)
GLUCOSE BLDC GLUCOMTR-MCNC: 151 MG/DL — HIGH (ref 70–99)
GLUCOSE BLDC GLUCOMTR-MCNC: 184 MG/DL — HIGH (ref 70–99)
GLUCOSE BLDC GLUCOMTR-MCNC: 210 MG/DL — HIGH (ref 70–99)
GLUCOSE SERPL-MCNC: 158 MG/DL — HIGH (ref 70–99)
GLUCOSE SERPL-MCNC: 194 MG/DL — HIGH (ref 70–99)
HCT VFR BLD CALC: 41.8 % — LOW (ref 42–52)
HGB BLD-MCNC: 12.7 G/DL — LOW (ref 14–18)
LIDOCAIN IGE QN: 29 U/L — SIGNIFICANT CHANGE UP (ref 7–60)
MAGNESIUM SERPL-MCNC: 1.9 MG/DL — SIGNIFICANT CHANGE UP (ref 1.8–2.4)
MCHC RBC-ENTMCNC: 22.5 PG — LOW (ref 27–31)
MCHC RBC-ENTMCNC: 30.4 G/DL — LOW (ref 32–37)
MCV RBC AUTO: 74.1 FL — LOW (ref 80–94)
NRBC # BLD: 0 /100 WBCS — SIGNIFICANT CHANGE UP (ref 0–0)
PLATELET # BLD AUTO: 267 K/UL — SIGNIFICANT CHANGE UP (ref 130–400)
POTASSIUM SERPL-MCNC: 4 MMOL/L — SIGNIFICANT CHANGE UP (ref 3.5–5)
POTASSIUM SERPL-MCNC: 4.2 MMOL/L — SIGNIFICANT CHANGE UP (ref 3.5–5)
POTASSIUM SERPL-SCNC: 4 MMOL/L — SIGNIFICANT CHANGE UP (ref 3.5–5)
POTASSIUM SERPL-SCNC: 4.2 MMOL/L — SIGNIFICANT CHANGE UP (ref 3.5–5)
PROT SERPL-MCNC: 7.3 G/DL — SIGNIFICANT CHANGE UP (ref 6–8)
RBC # BLD: 5.64 M/UL — SIGNIFICANT CHANGE UP (ref 4.7–6.1)
RBC # FLD: 24.4 % — HIGH (ref 11.5–14.5)
SODIUM SERPL-SCNC: 136 MMOL/L — SIGNIFICANT CHANGE UP (ref 135–146)
SODIUM SERPL-SCNC: 138 MMOL/L — SIGNIFICANT CHANGE UP (ref 135–146)
SPECIMEN SOURCE: SIGNIFICANT CHANGE UP
SPECIMEN SOURCE: SIGNIFICANT CHANGE UP
WBC # BLD: 12.18 K/UL — HIGH (ref 4.8–10.8)
WBC # FLD AUTO: 12.18 K/UL — HIGH (ref 4.8–10.8)

## 2020-02-26 PROCEDURE — 99233 SBSQ HOSP IP/OBS HIGH 50: CPT

## 2020-02-26 PROCEDURE — 76705 ECHO EXAM OF ABDOMEN: CPT | Mod: 26

## 2020-02-26 PROCEDURE — 74018 RADEX ABDOMEN 1 VIEW: CPT | Mod: 26

## 2020-02-26 RX ORDER — SODIUM CHLORIDE 9 MG/ML
1000 INJECTION INTRAMUSCULAR; INTRAVENOUS; SUBCUTANEOUS
Refills: 0 | Status: DISCONTINUED | OUTPATIENT
Start: 2020-02-26 | End: 2020-03-03

## 2020-02-26 RX ORDER — ONDANSETRON 8 MG/1
4 TABLET, FILM COATED ORAL ONCE
Refills: 0 | Status: DISCONTINUED | OUTPATIENT
Start: 2020-02-26 | End: 2020-03-19

## 2020-02-26 RX ORDER — METOPROLOL TARTRATE 50 MG
50 TABLET ORAL DAILY
Refills: 0 | Status: DISCONTINUED | OUTPATIENT
Start: 2020-02-27 | End: 2020-03-19

## 2020-02-26 RX ORDER — OXYCODONE AND ACETAMINOPHEN 5; 325 MG/1; MG/1
1 TABLET ORAL ONCE
Refills: 0 | Status: DISCONTINUED | OUTPATIENT
Start: 2020-02-26 | End: 2020-02-26

## 2020-02-26 RX ORDER — OXYCODONE HYDROCHLORIDE 5 MG/1
5 TABLET ORAL ONCE
Refills: 0 | Status: DISCONTINUED | OUTPATIENT
Start: 2020-02-26 | End: 2020-02-26

## 2020-02-26 RX ADMIN — Medication 1 APPLICATION(S): at 17:03

## 2020-02-26 RX ADMIN — DULOXETINE HYDROCHLORIDE 90 MILLIGRAM(S): 30 CAPSULE, DELAYED RELEASE ORAL at 11:26

## 2020-02-26 RX ADMIN — Medication 6 UNIT(S): at 07:45

## 2020-02-26 RX ADMIN — CEFTRIAXONE 100 MILLIGRAM(S): 500 INJECTION, POWDER, FOR SOLUTION INTRAMUSCULAR; INTRAVENOUS at 11:47

## 2020-02-26 RX ADMIN — Medication 6 UNIT(S): at 11:27

## 2020-02-26 RX ADMIN — PRASUGREL 10 MILLIGRAM(S): 5 TABLET, FILM COATED ORAL at 11:26

## 2020-02-26 RX ADMIN — HEPARIN SODIUM 5000 UNIT(S): 5000 INJECTION INTRAVENOUS; SUBCUTANEOUS at 21:08

## 2020-02-26 RX ADMIN — HEPARIN SODIUM 5000 UNIT(S): 5000 INJECTION INTRAVENOUS; SUBCUTANEOUS at 05:38

## 2020-02-26 RX ADMIN — Medication 81 MILLIGRAM(S): at 11:27

## 2020-02-26 RX ADMIN — Medication 1 APPLICATION(S): at 05:38

## 2020-02-26 RX ADMIN — Medication 150 MILLIGRAM(S): at 05:38

## 2020-02-26 RX ADMIN — Medication 2: at 11:27

## 2020-02-26 RX ADMIN — CHLORHEXIDINE GLUCONATE 1 APPLICATION(S): 213 SOLUTION TOPICAL at 05:37

## 2020-02-26 RX ADMIN — HEPARIN SODIUM 5000 UNIT(S): 5000 INJECTION INTRAVENOUS; SUBCUTANEOUS at 13:08

## 2020-02-26 RX ADMIN — PANTOPRAZOLE SODIUM 40 MILLIGRAM(S): 20 TABLET, DELAYED RELEASE ORAL at 21:08

## 2020-02-26 RX ADMIN — ATORVASTATIN CALCIUM 40 MILLIGRAM(S): 80 TABLET, FILM COATED ORAL at 11:27

## 2020-02-26 RX ADMIN — INSULIN GLARGINE 20 UNIT(S): 100 INJECTION, SOLUTION SUBCUTANEOUS at 21:08

## 2020-02-26 RX ADMIN — Medication 4: at 07:46

## 2020-02-26 RX ADMIN — Medication 0.12 MILLIGRAM(S): at 05:38

## 2020-02-26 RX ADMIN — OXYCODONE AND ACETAMINOPHEN 1 TABLET(S): 5; 325 TABLET ORAL at 06:11

## 2020-02-26 RX ADMIN — OXYCODONE HYDROCHLORIDE 5 MILLIGRAM(S): 5 TABLET ORAL at 13:08

## 2020-02-26 RX ADMIN — OXYCODONE HYDROCHLORIDE 5 MILLIGRAM(S): 5 TABLET ORAL at 13:38

## 2020-02-26 RX ADMIN — OXYCODONE AND ACETAMINOPHEN 1 TABLET(S): 5; 325 TABLET ORAL at 05:37

## 2020-02-26 RX ADMIN — SODIUM CHLORIDE 75 MILLILITER(S): 9 INJECTION INTRAMUSCULAR; INTRAVENOUS; SUBCUTANEOUS at 13:08

## 2020-02-26 RX ADMIN — TAMSULOSIN HYDROCHLORIDE 0.4 MILLIGRAM(S): 0.4 CAPSULE ORAL at 21:08
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
## 2020-02-26 NOTE — PROGRESS NOTE ADULT - SUBJECTIVE AND OBJECTIVE BOX
HPI:  Currently admitted to medicine with the primary diagnosis of Metabolic encephalopathy     Today is hospital day 4d.     INTERVAL HPI / OVERNIGHT EVENTS:  Patient was examined and seen at bedside. This morning he is resting comfortably in bed and reports no new issues or overnight events. Mentation is intact, aao x3. Denied any cp or shortness of breath. Has some right upper abdominal pain, presumably related to celiac disease per patient.    ROS: Otherwise unremarkable     PAST MEDICAL & SURGICAL HISTORY  Diabetic neuropathy  STEMI (ST elevation myocardial infarction)  Diverticulitis  MRSA bacteremia  History of celiac disease  CHF (congestive heart failure): EF ~ 25%  HTN (hypertension)  Diabetes: on insulin pump  Blood clot due to device, implant, or graft: was on blood thinners  HLD (hyperlipidemia)  Osteoarthritis  Atherosclerosis of coronary artery: CAD (coronary artery disease)  Status post percutaneous transluminal coronary angioplasty: in 2012  History of open reduction and internal fixation (ORIF) procedure  Surgery, elective: Right shoulder  Surgery, elective: right knee wound debridement  S/P TKR (total knee replacement), right: with infection Mrsa   per pt he was cleared from MRSA infection  S/P CABG x 1: 2018  Stented coronary artery: 10/18 heart attack  INFERIOR WALL MI  Other postprocedural status: Fixation hardware in foot LEFT    ALLERGIES  ACE inhibitors (Hives)  enalapril (Hives)    MEDICATIONS  STANDING MEDICATIONS  aspirin enteric coated 81 milliGRAM(s) Oral daily  atorvastatin Oral Tab/Cap - Peds 40 milliGRAM(s) Oral daily  BACItracin   Ointment 1 Application(s) Topical two times a day  dextrose 5%. 1000 milliLiter(s) IV Continuous <Continuous>  dextrose 50% Injectable 12.5 Gram(s) IV Push once  dextrose 50% Injectable 25 Gram(s) IV Push once  dextrose 50% Injectable 25 Gram(s) IV Push once  digoxin     Tablet 0.125 milliGRAM(s) Oral daily  DULoxetine 90 milliGRAM(s) Oral daily  heparin  Injectable 5000 Unit(s) SubCutaneous every 8 hours  insulin glargine Injectable (LANTUS) 20 Unit(s) SubCutaneous at bedtime  insulin lispro (HumaLOG) corrective regimen sliding scale   SubCutaneous three times a day before meals  insulin lispro Injectable (HumaLOG) 6 Unit(s) SubCutaneous three times a day before meals  metoprolol succinate  milliGRAM(s) Oral daily  pantoprazole    Tablet 40 milliGRAM(s) Oral before breakfast  prasugrel 10 milliGRAM(s) Oral daily  tamsulosin Oral Tab/Cap - Peds 0.4 milliGRAM(s) Oral at bedtime    PRN MEDICATIONS  acetaminophen   Tablet .. 650 milliGRAM(s) Oral every 6 hours PRN  dextrose 40% Gel 15 Gram(s) Oral once PRN  glucagon  Injectable 1 milliGRAM(s) IntraMuscular once PRN    VITALS:  T(F): 98.3  HR: 88  BP: 112/63  RR: 18  SpO2: 98%    PHYSICAL EXAM  GEN: NAD, Resting comfortably in bed  PULM: Clear to auscultation bilaterally, No wheezes  CVS: Regular rate and rhythm, S1-S2  ABD: Soft, non-tender, non-distended, no guarding  EXT: No edema  NEURO: AAOx3, slow mentation still    LABS                        13.0   8.38  )-----------( 292      ( 24 Feb 2020 05:56 )             43.3     02-24    137  |  94<L>  |  26<H>  ----------------------------<  179<H>  3.5   |  27  |  0.8    Ca    9.4      24 Feb 2020 05:56  Mg     2.4     02-23    TPro  6.8  /  Alb  3.5  /  TBili  1.8<H>  /  DBili  x   /  AST  30  /  ALT  20  /  AlkPhos  238<H>  02-23      RADIOLOGY    < from: US Kidney and Bladder (02.21.20 @ 09:44) >  IMPRESSION:  Mild left hydronephrosis without any obvious stones. Consider a CT for further evaluation.    < end of copied text >    < from: CT Head No Cont (02.21.20 @ 02:26) >  IMPRESSION:     1.  No acute intra-cranial pathology.    2.  Moderate chronic microvascular ischemic changes.     < end of copied text >    < from: Xray Chest 1 View- PORTABLE-Urgent (02.21.20 @ 01:16) >  Impression:      No radiographic evidence of acute cardiopulmonary disease.    < end of copied text >      < from: EEG (02.24.20 @ 12:00) >  Impression  -  Abnormal due to the presence of: generalized slowing as above    < end of copied text > HPI:  Currently admitted to medicine with the primary diagnosis of Metabolic encephalopathy     Today is hospital day 4d.     INTERVAL HPI / OVERNIGHT EVENTS:  Patient was examined and seen at bedside. This morning he is resting comfortably in bed and reports no new issues or overnight events. Mentation is intact, aao x3. Denied any cp or shortness of breath. Has some right upper abdominal pain, presumably related to celiac disease per patient.    ROS: Otherwise unremarkable     PAST MEDICAL & SURGICAL HISTORY  Diabetic neuropathy  STEMI (ST elevation myocardial infarction)  Diverticulitis  MRSA bacteremia  History of celiac disease  CHF (congestive heart failure): EF ~ 25%  HTN (hypertension)  Diabetes: on insulin pump  Blood clot due to device, implant, or graft: was on blood thinners  HLD (hyperlipidemia)  Osteoarthritis  Atherosclerosis of coronary artery: CAD (coronary artery disease)  Status post percutaneous transluminal coronary angioplasty: in 2012  History of open reduction and internal fixation (ORIF) procedure  Surgery, elective: Right shoulder  Surgery, elective: right knee wound debridement  S/P TKR (total knee replacement), right: with infection Mrsa   per pt he was cleared from MRSA infection  S/P CABG x 1: 2018  Stented coronary artery: 10/18 heart attack  INFERIOR WALL MI  Other postprocedural status: Fixation hardware in foot LEFT    ALLERGIES  ACE inhibitors (Hives)  enalapril (Hives)    MEDICATIONS  STANDING MEDICATIONS  aspirin enteric coated 81 milliGRAM(s) Oral daily  atorvastatin Oral Tab/Cap - Peds 40 milliGRAM(s) Oral daily  BACItracin   Ointment 1 Application(s) Topical two times a day  dextrose 5%. 1000 milliLiter(s) IV Continuous <Continuous>  dextrose 50% Injectable 12.5 Gram(s) IV Push once  dextrose 50% Injectable 25 Gram(s) IV Push once  dextrose 50% Injectable 25 Gram(s) IV Push once  digoxin     Tablet 0.125 milliGRAM(s) Oral daily  DULoxetine 90 milliGRAM(s) Oral daily  heparin  Injectable 5000 Unit(s) SubCutaneous every 8 hours  insulin glargine Injectable (LANTUS) 20 Unit(s) SubCutaneous at bedtime  insulin lispro (HumaLOG) corrective regimen sliding scale   SubCutaneous three times a day before meals  insulin lispro Injectable (HumaLOG) 6 Unit(s) SubCutaneous three times a day before meals  metoprolol succinate  milliGRAM(s) Oral daily  pantoprazole    Tablet 40 milliGRAM(s) Oral before breakfast  prasugrel 10 milliGRAM(s) Oral daily  tamsulosin Oral Tab/Cap - Peds 0.4 milliGRAM(s) Oral at bedtime    PRN MEDICATIONS  acetaminophen   Tablet .. 650 milliGRAM(s) Oral every 6 hours PRN  dextrose 40% Gel 15 Gram(s) Oral once PRN  glucagon  Injectable 1 milliGRAM(s) IntraMuscular once PRN    VITALS:  T(F): 98.3  HR: 88  BP: 112/63  RR: 18  SpO2: 98%    PHYSICAL EXAM  GEN: NAD, Resting comfortably in bed  PULM: Clear to auscultation bilaterally, No wheezes  CVS: Regular rate and rhythm, S1-S2  ABD: Soft, non-distended, some tenderness in right upper quadrant. No peritoneal signs. no guarding  EXT: No edema  NEURO: AAOx3, slow mentation still    LABS                        13.0   8.38  )-----------( 292      ( 24 Feb 2020 05:56 )             43.3     02-24    137  |  94<L>  |  26<H>  ----------------------------<  179<H>  3.5   |  27  |  0.8    Ca    9.4      24 Feb 2020 05:56  Mg     2.4     02-23    TPro  6.8  /  Alb  3.5  /  TBili  1.8<H>  /  DBili  x   /  AST  30  /  ALT  20  /  AlkPhos  238<H>  02-23      RADIOLOGY    < from: US Kidney and Bladder (02.21.20 @ 09:44) >  IMPRESSION:  Mild left hydronephrosis without any obvious stones. Consider a CT for further evaluation.    < end of copied text >    < from: CT Head No Cont (02.21.20 @ 02:26) >  IMPRESSION:     1.  No acute intra-cranial pathology.    2.  Moderate chronic microvascular ischemic changes.     < end of copied text >    < from: Xray Chest 1 View- PORTABLE-Urgent (02.21.20 @ 01:16) >  Impression:      No radiographic evidence of acute cardiopulmonary disease.    < end of copied text >      < from: EEG (02.24.20 @ 12:00) >  Impression  -  Abnormal due to the presence of: generalized slowing as above    < end of copied text >
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
## 2020-02-26 NOTE — PROGRESS NOTE ADULT - SUBJECTIVE AND OBJECTIVE BOX
FLOWER MARISCAL  64y  Male      Patient is a 64y old  Male who presents with a chief complaint of Altered mental status (26 Feb 2020 10:09)      INTERVAL HPI/OVERNIGHT EVENTS: feels unwell today. had diffuse cramping upper abdominal pain overnight and intermittently during the daytime. he attributes it to his celiac disease but does not report any nausea or diarrhea today. overall feels very weak      REVIEW OF SYSTEMS:  as above  All other review of systems negative    T(C): 36.3 (02-26-20 @ 13:11), Max: 37.4 (02-25-20 @ 21:29)  HR: 71 (02-26-20 @ 13:11) (71 - 79)  BP: 109/56 (02-26-20 @ 13:11) (109/56 - 121/56)  RR: 18 (02-26-20 @ 05:00) (18 - 18)  SpO2: 98% (02-26-20 @ 07:13) (98% - 98%)  Wt(kg): --Vital Signs Last 24 Hrs  T(C): 36.3 (26 Feb 2020 13:11), Max: 37.4 (25 Feb 2020 21:29)  T(F): 97.4 (26 Feb 2020 13:11), Max: 99.4 (25 Feb 2020 21:29)  HR: 71 (26 Feb 2020 13:11) (71 - 79)  BP: 109/56 (26 Feb 2020 13:11) (109/56 - 121/56)  BP(mean): --  RR: 18 (26 Feb 2020 05:00) (18 - 18)  SpO2: 98% (26 Feb 2020 07:13) (98% - 98%)      02-25-20 @ 07:01  -  02-26-20 @ 07:00  --------------------------------------------------------  IN: 220 mL / OUT: 1050 mL / NET: -830 mL    02-26-20 @ 07:01  -  02-26-20 @ 16:37  --------------------------------------------------------  IN: 0 mL / OUT: 300 mL / NET: -300 mL        PHYSICAL EXAM:  GENERAL: appearing unwell, temporal wasting/ very deconditioned for stated age  PSYCH: no agitation, baseline mentation  NERVOUS SYSTEM:  Alert & Oriented X3, no new focal deficits  PULMONARY: Clear to percussion bilaterally; No rales, rhonchi, wheezing, or rubs  CARDIOVASCULAR: irregalar; No murmurs, rubs, or gallops  GI: Soft, Nontender, Nondistended; Bowel sounds present   fields with minimal sediment  EXTREMITIES:  2+ Peripheral Pulses, No clubbing, cyanosis, or edema  MUSCULOSKELETAL: R hip surgical sites well appearing    Consultant(s) Notes Reviewed:  [x ] YES  [ ] NO    Discussed with Consultants/Other Providers [ x] YES     LABS                          12.7   12.18 )-----------( 267      ( 26 Feb 2020 06:47 )             41.8     02-26    138  |  94<L>  |  23<H>  ----------------------------<  194<H>  4.2   |  28  |  0.9    Ca    9.3      26 Feb 2020 06:47  Mg     1.9     02-26            Lactate Trend        CAPILLARY BLOOD GLUCOSE      POCT Blood Glucose.: 184 mg/dL (26 Feb 2020 11:23)        RADIOLOGY & ADDITIONAL TESTS:    Imaging Personally Reviewed:  [ ] YES  [ ] NO    HEALTH ISSUES - PROBLEM Dx:  Encephalopathy, metabolic  Delirium due to medical condition without behavioral disturbance  Delirium
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
## 2020-02-27 ENCOUNTER — APPOINTMENT (OUTPATIENT)
Dept: BURN CARE | Facility: CLINIC | Age: 65
End: 2020-02-27

## 2020-02-27 DIAGNOSIS — Z95.1 PRESENCE OF AORTOCORONARY BYPASS GRAFT: ICD-10-CM

## 2020-02-27 DIAGNOSIS — I42.9 CARDIOMYOPATHY, UNSPECIFIED: ICD-10-CM

## 2020-02-27 DIAGNOSIS — I25.2 OLD MYOCARDIAL INFARCTION: ICD-10-CM
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
## 2020-02-27 DIAGNOSIS — M21.151 VARUS DEFORMITY, NOT ELSEWHERE CLASSIFIED, RIGHT HIP: ICD-10-CM

## 2020-02-27 DIAGNOSIS — E83.42 HYPOMAGNESEMIA: ICD-10-CM

## 2020-02-27 DIAGNOSIS — S72.141A DISPLACED INTERTROCHANTERIC FRACTURE OF RIGHT FEMUR, INITIAL ENCOUNTER FOR CLOSED FRACTURE: ICD-10-CM

## 2020-02-27 DIAGNOSIS — Z91.09 OTHER ALLERGY STATUS, OTHER THAN TO DRUGS AND BIOLOGICAL SUBSTANCES: ICD-10-CM

## 2020-02-27 DIAGNOSIS — Z79.02 LONG TERM (CURRENT) USE OF ANTITHROMBOTICS/ANTIPLATELETS: ICD-10-CM

## 2020-02-27 DIAGNOSIS — Z95.5 PRESENCE OF CORONARY ANGIOPLASTY IMPLANT AND GRAFT: ICD-10-CM

## 2020-02-27 DIAGNOSIS — I44.7 LEFT BUNDLE-BRANCH BLOCK, UNSPECIFIED: ICD-10-CM

## 2020-02-27 DIAGNOSIS — E78.5 HYPERLIPIDEMIA, UNSPECIFIED: ICD-10-CM

## 2020-02-27 DIAGNOSIS — Z96.41 PRESENCE OF INSULIN PUMP (EXTERNAL) (INTERNAL): ICD-10-CM

## 2020-02-27 DIAGNOSIS — Z88.1 ALLERGY STATUS TO OTHER ANTIBIOTIC AGENTS STATUS: ICD-10-CM
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
## 2020-02-27 DIAGNOSIS — G92 TOXIC ENCEPHALOPATHY: ICD-10-CM

## 2020-02-27 DIAGNOSIS — Z79.82 LONG TERM (CURRENT) USE OF ASPIRIN: ICD-10-CM
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
## 2020-02-27 DIAGNOSIS — I11.0 HYPERTENSIVE HEART DISEASE WITH HEART FAILURE: ICD-10-CM

## 2020-02-27 DIAGNOSIS — Z79.4 LONG TERM (CURRENT) USE OF INSULIN: ICD-10-CM

## 2020-02-27 DIAGNOSIS — I50.32 CHRONIC DIASTOLIC (CONGESTIVE) HEART FAILURE: ICD-10-CM

## 2020-02-27 DIAGNOSIS — E11.42 TYPE 2 DIABETES MELLITUS WITH DIABETIC POLYNEUROPATHY: ICD-10-CM

## 2020-02-27 DIAGNOSIS — I25.10 ATHEROSCLEROTIC HEART DISEASE OF NATIVE CORONARY ARTERY WITHOUT ANGINA PECTORIS: ICD-10-CM

## 2020-02-27 DIAGNOSIS — S72.21XA DISPLACED SUBTROCHANTERIC FRACTURE OF RIGHT FEMUR, INITIAL ENCOUNTER FOR CLOSED FRACTURE: ICD-10-CM

## 2020-02-27 DIAGNOSIS — I27.20 PULMONARY HYPERTENSION, UNSPECIFIED: ICD-10-CM

## 2020-02-27 DIAGNOSIS — K90.0 CELIAC DISEASE: ICD-10-CM

## 2020-02-27 DIAGNOSIS — Z96.651 PRESENCE OF RIGHT ARTIFICIAL KNEE JOINT: ICD-10-CM

## 2020-02-27 DIAGNOSIS — B95.62 METHICILLIN RESISTANT STAPHYLOCOCCUS AUREUS INFECTION AS THE CAUSE OF DISEASES CLASSIFIED ELSEWHERE: ICD-10-CM

## 2020-02-27 LAB
ALBUMIN SERPL ELPH-MCNC: 3.5 G/DL — SIGNIFICANT CHANGE UP (ref 3.5–5.2)
ALP SERPL-CCNC: 264 U/L — HIGH (ref 30–115)
ALT FLD-CCNC: 22 U/L — SIGNIFICANT CHANGE UP (ref 0–41)
ANION GAP SERPL CALC-SCNC: 22 MMOL/L — HIGH (ref 7–14)
AST SERPL-CCNC: 43 U/L — HIGH (ref 0–41)
BILIRUB SERPL-MCNC: 2.8 MG/DL — HIGH (ref 0.2–1.2)
BUN SERPL-MCNC: 21 MG/DL — HIGH (ref 10–20)
CALCIUM SERPL-MCNC: 9.6 MG/DL — SIGNIFICANT CHANGE UP (ref 8.5–10.1)
CHLORIDE SERPL-SCNC: 95 MMOL/L — LOW (ref 98–110)
CO2 SERPL-SCNC: 21 MMOL/L — SIGNIFICANT CHANGE UP (ref 17–32)
CREAT SERPL-MCNC: 0.7 MG/DL — SIGNIFICANT CHANGE UP (ref 0.7–1.5)
GAS PNL BLDV: SIGNIFICANT CHANGE UP
GLUCOSE BLDC GLUCOMTR-MCNC: 165 MG/DL — HIGH (ref 70–99)
GLUCOSE BLDC GLUCOMTR-MCNC: 183 MG/DL — HIGH (ref 70–99)
GLUCOSE BLDC GLUCOMTR-MCNC: 197 MG/DL — HIGH (ref 70–99)
GLUCOSE BLDC GLUCOMTR-MCNC: 215 MG/DL — HIGH (ref 70–99)
GLUCOSE SERPL-MCNC: 172 MG/DL — HIGH (ref 70–99)
HCT VFR BLD CALC: 49.5 % — SIGNIFICANT CHANGE UP (ref 42–52)
HGB BLD-MCNC: 14.3 G/DL — SIGNIFICANT CHANGE UP (ref 14–18)
LIDOCAIN IGE QN: 18 U/L — SIGNIFICANT CHANGE UP (ref 7–60)
MAGNESIUM SERPL-MCNC: 1.9 MG/DL — SIGNIFICANT CHANGE UP (ref 1.8–2.4)
MCHC RBC-ENTMCNC: 21.5 PG — LOW (ref 27–31)
MCHC RBC-ENTMCNC: 28.9 G/DL — LOW (ref 32–37)
MCV RBC AUTO: 74.5 FL — LOW (ref 80–94)
NRBC # BLD: 0 /100 WBCS — SIGNIFICANT CHANGE UP (ref 0–0)
PLATELET # BLD AUTO: 285 K/UL — SIGNIFICANT CHANGE UP (ref 130–400)
POTASSIUM SERPL-MCNC: 4.2 MMOL/L — SIGNIFICANT CHANGE UP (ref 3.5–5)
POTASSIUM SERPL-SCNC: 4.2 MMOL/L — SIGNIFICANT CHANGE UP (ref 3.5–5)
PROT SERPL-MCNC: 7.4 G/DL — SIGNIFICANT CHANGE UP (ref 6–8)
RBC # BLD: 6.64 M/UL — HIGH (ref 4.7–6.1)
RBC # FLD: 24.9 % — HIGH (ref 11.5–14.5)
SODIUM SERPL-SCNC: 138 MMOL/L — SIGNIFICANT CHANGE UP (ref 135–146)
WBC # BLD: 22.67 K/UL — HIGH (ref 4.8–10.8)
WBC # FLD AUTO: 22.67 K/UL — HIGH (ref 4.8–10.8)

## 2020-02-27 PROCEDURE — 74177 CT ABD & PELVIS W/CONTRAST: CPT | Mod: 26

## 2020-02-27 PROCEDURE — 73502 X-RAY EXAM HIP UNI 2-3 VIEWS: CPT | Mod: 26,RT

## 2020-02-27 PROCEDURE — 78226 HEPATOBILIARY SYSTEM IMAGING: CPT | Mod: 26

## 2020-02-27 PROCEDURE — 99233 SBSQ HOSP IP/OBS HIGH 50: CPT

## 2020-02-27 PROCEDURE — 99221 1ST HOSP IP/OBS SF/LOW 40: CPT

## 2020-02-27 RX ORDER — METRONIDAZOLE 500 MG
TABLET ORAL
Refills: 0 | Status: DISCONTINUED | OUTPATIENT
Start: 2020-02-27 | End: 2020-03-03

## 2020-02-27 RX ORDER — METRONIDAZOLE 500 MG
500 TABLET ORAL EVERY 8 HOURS
Refills: 0 | Status: DISCONTINUED | OUTPATIENT
Start: 2020-02-27 | End: 2020-03-03

## 2020-02-27 RX ORDER — METRONIDAZOLE 500 MG
500 TABLET ORAL ONCE
Refills: 0 | Status: COMPLETED | OUTPATIENT
Start: 2020-02-27 | End: 2020-02-27

## 2020-02-27 RX ADMIN — Medication 81 MILLIGRAM(S): at 11:22

## 2020-02-27 RX ADMIN — CHLORHEXIDINE GLUCONATE 1 APPLICATION(S): 213 SOLUTION TOPICAL at 05:09

## 2020-02-27 RX ADMIN — SODIUM CHLORIDE 75 MILLILITER(S): 9 INJECTION INTRAMUSCULAR; INTRAVENOUS; SUBCUTANEOUS at 16:41

## 2020-02-27 RX ADMIN — Medication 1 APPLICATION(S): at 05:08

## 2020-02-27 RX ADMIN — ATORVASTATIN CALCIUM 40 MILLIGRAM(S): 80 TABLET, FILM COATED ORAL at 11:23

## 2020-02-27 RX ADMIN — SODIUM CHLORIDE 75 MILLILITER(S): 9 INJECTION INTRAMUSCULAR; INTRAVENOUS; SUBCUTANEOUS at 22:48

## 2020-02-27 RX ADMIN — HEPARIN SODIUM 5000 UNIT(S): 5000 INJECTION INTRAVENOUS; SUBCUTANEOUS at 22:48

## 2020-02-27 RX ADMIN — Medication 100 MILLIGRAM(S): at 22:48

## 2020-02-27 RX ADMIN — CEFTRIAXONE 100 MILLIGRAM(S): 500 INJECTION, POWDER, FOR SOLUTION INTRAMUSCULAR; INTRAVENOUS at 12:09

## 2020-02-27 RX ADMIN — HEPARIN SODIUM 5000 UNIT(S): 5000 INJECTION INTRAVENOUS; SUBCUTANEOUS at 14:42

## 2020-02-27 RX ADMIN — HEPARIN SODIUM 5000 UNIT(S): 5000 INJECTION INTRAVENOUS; SUBCUTANEOUS at 05:09

## 2020-02-27 RX ADMIN — Medication 50 MILLIGRAM(S): at 05:08

## 2020-02-27 RX ADMIN — DULOXETINE HYDROCHLORIDE 90 MILLIGRAM(S): 30 CAPSULE, DELAYED RELEASE ORAL at 11:22

## 2020-02-27 RX ADMIN — Medication 100 MILLIGRAM(S): at 14:49

## 2020-02-27 RX ADMIN — Medication 1 APPLICATION(S): at 17:03

## 2020-02-27 RX ADMIN — Medication 0.12 MILLIGRAM(S): at 05:08

## 2020-02-27 RX ADMIN — PANTOPRAZOLE SODIUM 40 MILLIGRAM(S): 20 TABLET, DELAYED RELEASE ORAL at 22:48

## 2020-02-27 RX ADMIN — TAMSULOSIN HYDROCHLORIDE 0.4 MILLIGRAM(S): 0.4 CAPSULE ORAL at 22:48

## 2020-02-27 RX ADMIN — Medication 100 MILLIGRAM(S): at 11:22
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
## 2020-02-27 NOTE — CONSULT NOTE ADULT - SUBJECTIVE AND OBJECTIVE BOX
Patient is a 65 y/o with PMHx of HTN, DLD, CAD s/p CABG, HFrEF (25-30% on ECHO 2/2020, 20-25% on MUGA scan in 12/2019) s/p st carlyle BiV placement 2/2020, DM (on insulin pump), pulmonary hypertension, Celiac disease, diverticulitis, s/p right total knee replacement October 2019 complicated by infection s/p abx course w/ PICC and  right thigh with skin graft, right hip fracture s/p ORIF 2/2020 , that presented to [facility] with AMS and being combative. Advanced GI consulted for abnormal Liver function studies. Patient seen earlier and mentation fluctuates, poor historian.         PAST MEDICAL & SURGICAL HISTORY:  Diabetic neuropathy  STEMI (ST elevation myocardial infarction)  Diverticulitis  MRSA bacteremia  History of celiac disease  CHF (congestive heart failure): EF ~ 25%  HTN (hypertension)  Diabetes: on insulin pump  Blood clot due to device, implant, or graft: was on blood thinners  HLD (hyperlipidemia)  Osteoarthritis  Atherosclerosis of coronary artery: CAD (coronary artery disease)  Status post percutaneous transluminal coronary angioplasty: in 2012  History of open reduction and internal fixation (ORIF) procedure  Surgery, elective: Right shoulder  Surgery, elective: right knee wound debridement  S/P TKR (total knee replacement), right: with infection Mrsa   per pt he was cleared from MRSA infection  S/P CABG x 1: 2018  Stented coronary artery: 10/18 heart attack  INFERIOR WALL MI  Other postprocedural status: Fixation hardware in foot LEFT        MEDICATIONS  (STANDING):  aspirin enteric coated 81 milliGRAM(s) Oral daily  atorvastatin Oral Tab/Cap - Peds 40 milliGRAM(s) Oral daily  BACItracin   Ointment 1 Application(s) Topical two times a day  cefTRIAXone   IVPB 1000 milliGRAM(s) IV Intermittent every 24 hours  cefTRIAXone   IVPB      chlorhexidine 4% Liquid 1 Application(s) Topical <User Schedule>  dextrose 5%. 1000 milliLiter(s) (50 mL/Hr) IV Continuous <Continuous>  dextrose 50% Injectable 12.5 Gram(s) IV Push once  dextrose 50% Injectable 25 Gram(s) IV Push once  dextrose 50% Injectable 25 Gram(s) IV Push once  digoxin     Tablet 0.125 milliGRAM(s) Oral daily  DULoxetine 90 milliGRAM(s) Oral daily  heparin  Injectable 5000 Unit(s) SubCutaneous every 8 hours  insulin glargine Injectable (LANTUS) 20 Unit(s) SubCutaneous at bedtime  insulin lispro (HumaLOG) corrective regimen sliding scale   SubCutaneous three times a day before meals  insulin lispro Injectable (HumaLOG) 6 Unit(s) SubCutaneous three times a day before meals  metoprolol succinate ER 50 milliGRAM(s) Oral daily  metroNIDAZOLE  IVPB      metroNIDAZOLE  IVPB 500 milliGRAM(s) IV Intermittent every 8 hours  pantoprazole    Tablet 40 milliGRAM(s) Oral at bedtime  prasugrel 10 milliGRAM(s) Oral daily  sodium chloride 0.9%. 1000 milliLiter(s) (75 mL/Hr) IV Continuous <Continuous>  tamsulosin Oral Tab/Cap - Peds 0.4 milliGRAM(s) Oral at bedtime    MEDICATIONS  (PRN):  acetaminophen   Tablet .. 650 milliGRAM(s) Oral every 6 hours PRN Temp greater or equal to 38C (100.4F), Moderate Pain (4 - 6)  aluminum hydroxide/magnesium hydroxide/simethicone Suspension 30 milliLiter(s) Oral every 6 hours PRN Dyspepsia  dextrose 40% Gel 15 Gram(s) Oral once PRN Blood Glucose LESS THAN 70 milliGRAM(s)/deciliter  glucagon  Injectable 1 milliGRAM(s) IntraMuscular once PRN Glucose LESS THAN 70 milligrams/deciliter  ondansetron Injectable 4 milliGRAM(s) IV Push once PRN Nausea and/or Vomiting      Allergies  ACE inhibitors (Hives)  enalapril (Hives)      Vital Signs   T(F): 96 (27 Feb 2020 13:32), Max: 98 (26 Feb 2020 21:25)  HR: 92 (27 Feb 2020 13:32) (85 - 92)  BP: 124/56 (27 Feb 2020 13:32) (123/64 - 133/64)  RR: 18 (27 Feb 2020 13:32) (18 - 18)  SpO2: 96% (27 Feb 2020 07:43) (96% - 96%)  Physical Exam  Gen: NAD  HEENT: NC/AT  Neck: supple   Cardio: S1/S2   Resp: CTA B/L  Abdomen: Soft, ND/TTP RUQ  Extremities: FROM x 4  Skin: No jaundice                             14.3   22.67 )-----------( 285      ( 27 Feb 2020 05:34 )             49.5     02-27    138  |  95<L>  |  21<H>  ----------------------------<  172<H>  4.2   |  21  |  0.7    Ca    9.6      27 Feb 2020 05:34  Mg     1.9     02-27    TPro  7.4  /  Alb  3.5  /  TBili  2.8<H>  /  DBili  x   /  AST  43<H>  /  ALT  22  /  AlkPhos  264<H>  02-27      RADIOLOGY & ADDITIONAL STUDIES:    CT Abdomen and Pelvis w/ IV Cont 02.27.20  IMPRESSION:    Hydropic gallbladder measuring approximately 11.6 x 4.9 cm with surrounding fat stranding and small amount of cholelithiasis. Correlation with right upper quadrant ultrasound from same day is recommended.    Indeterminateage ischemia/infarct to the lower pole of the left kidney. Further evaluation is recommended.

## 2020-02-27 NOTE — CONSULT NOTE ADULT - SUBJECTIVE AND OBJECTIVE BOX
CHIEF COMPLAINT:Patient is a 64y old  Male who presents with a chief complaint of Altered mental status (27 Feb 2020 09:48)      HISTORY OF PRESENT ILLNESS:   HPI:  [64 year old gentleman]    CC: Altered mental status     PM/SH: HTN, DLD, CAD s/p CABG, HFrEF (25-30% on ECHO 2/2020, 20-25% on MUGA scan in 12/2019) s/p st carlyle BiV placement 2/2020, DM (on insulin pump), pulmonary hypertension, Celiac disease, diverticulitis, s/p right total knee replacement October 2019 complicated by infection s/p abx course w/ PICC and  right thigh with skin graft, right hip fracture s/p ORIF 2/2020     History of Present Illness goes back to the day of admission. The patient as send here from NH where he was for rehab after his recent ORIF. Per the records, patient was agitated and combative with staff. Patient was claiming "there was a person sneaking around by his room and he wanted the staff from NH to call the Police." Patient also stated he was scared so he was screaming.  Per the patient he does not know exactly what happened. He said he realized he was acting weird but was not aware of why. He said he got into confrontations with the nursing staff, the police and the EMS because they were being aggressive towards him.   Patient otherwise denies any hallucination/ HA/dizziness/chest pain/sob/abd pain/n/v/d.    In the ED, vitals were stable. Labs showed elevated WBC (it has been before). Na was 130 (became low after last admission), K was 5.2, creatinine was 1.9 from 0.8. Lactate was 2.4. UA was grossly positive. Per the ED team and signout and patient fields was changed and sample was from urine. Per their notes, the patient refused fields removal. (21 Feb 2020 04:11)    PAST MEDICAL & SURGICAL HISTORY:  Diabetic neuropathy  STEMI (ST elevation myocardial infarction)  Diverticulitis  MRSA bacteremia  History of celiac disease  CHF (congestive heart failure): EF ~ 25%  HTN (hypertension)  Diabetes: on insulin pump  Blood clot due to device, implant, or graft: was on blood thinners  HLD (hyperlipidemia)  Osteoarthritis  Atherosclerosis of coronary artery: CAD (coronary artery disease)  Status post percutaneous transluminal coronary angioplasty: in 2012  History of open reduction and internal fixation (ORIF) procedure  Surgery, elective: Right shoulder  Surgery, elective: right knee wound debridement  S/P TKR (total knee replacement), right: with infection Mrsa   per pt he was cleared from MRSA infection  S/P CABG x 1: 2018  Stented coronary artery: 10/18 heart attack  INFERIOR WALL MI  Other postprocedural status: Fixation hardware in foot LEFT    FAMILY HISTORY:  Family history of allergies: daughter  Family history of heart disease (Mother)    Allergies    ACE inhibitors (Hives)  enalapril (Hives)    Intolerances    	  Home Medications:  aspirin 81 mg oral delayed release tablet: 1 tab(s) orally once a day (31 Jan 2020 06:21)  atorvastatin 40 mg oral tablet: 1 tab(s) orally once a day (31 Jan 2020 06:21)  digoxin 125 mcg (0.125 mg) oral tablet: 1 tab(s) orally once a day (31 Jan 2020 06:21)  DULoxetine 30 mg oral delayed release capsule: 3 cap(s) orally once a day (31 Jan 2020 08:32)  furosemide 40 mg oral tablet: 1 tab(s) orally once a day (31 Jan 2020 06:21)  Jardiance:  (31 Jan 2020 08:33)  metOLazone 2.5 mg oral tablet: 1 tab(s) orally once a day, 30 min before lasix (31 Jan 2020 06:21)  Metoprolol Succinate ER 50 mg oral tablet, extended release: 3 tab(s) orally once a day (31 Jan 2020 06:21)  pantoprazole 40 mg oral delayed release tablet: 1 tab(s) orally once a day (31 Jan 2020 06:21)  prasugrel 10 mg oral tablet: 1 tab(s) orally once a day (31 Jan 2020 06:21)  tamsulosin 0.4 mg oral capsule: 1 cap(s) orally once a day (at bedtime) (13 Feb 2020 11:05)  Trulicity Pen: subcutaneous once a week (31 Jan 2020 08:33)  Zetia 10 mg oral tablet: 1 tab(s) orally once a day (at bedtime) (31 Jan 2020 06:21)    MEDICATIONS  (STANDING):  aspirin enteric coated 81 milliGRAM(s) Oral daily  atorvastatin Oral Tab/Cap - Peds 40 milliGRAM(s) Oral daily  BACItracin   Ointment 1 Application(s) Topical two times a day  cefTRIAXone   IVPB 1000 milliGRAM(s) IV Intermittent every 24 hours  cefTRIAXone   IVPB      chlorhexidine 4% Liquid 1 Application(s) Topical <User Schedule>  dextrose 5%. 1000 milliLiter(s) (50 mL/Hr) IV Continuous <Continuous>  dextrose 50% Injectable 12.5 Gram(s) IV Push once  dextrose 50% Injectable 25 Gram(s) IV Push once  dextrose 50% Injectable 25 Gram(s) IV Push once  digoxin     Tablet 0.125 milliGRAM(s) Oral daily  DULoxetine 90 milliGRAM(s) Oral daily  heparin  Injectable 5000 Unit(s) SubCutaneous every 8 hours  insulin glargine Injectable (LANTUS) 20 Unit(s) SubCutaneous at bedtime  insulin lispro (HumaLOG) corrective regimen sliding scale   SubCutaneous three times a day before meals  insulin lispro Injectable (HumaLOG) 6 Unit(s) SubCutaneous three times a day before meals  metoprolol succinate ER 50 milliGRAM(s) Oral daily  metroNIDAZOLE  IVPB      metroNIDAZOLE  IVPB 500 milliGRAM(s) IV Intermittent every 8 hours  pantoprazole    Tablet 40 milliGRAM(s) Oral at bedtime  prasugrel 10 milliGRAM(s) Oral daily  sodium chloride 0.9%. 1000 milliLiter(s) (75 mL/Hr) IV Continuous <Continuous>  tamsulosin Oral Tab/Cap - Peds 0.4 milliGRAM(s) Oral at bedtime    MEDICATIONS  (PRN):  acetaminophen   Tablet .. 650 milliGRAM(s) Oral every 6 hours PRN Temp greater or equal to 38C (100.4F), Moderate Pain (4 - 6)  aluminum hydroxide/magnesium hydroxide/simethicone Suspension 30 milliLiter(s) Oral every 6 hours PRN Dyspepsia  dextrose 40% Gel 15 Gram(s) Oral once PRN Blood Glucose LESS THAN 70 milliGRAM(s)/deciliter  glucagon  Injectable 1 milliGRAM(s) IntraMuscular once PRN Glucose LESS THAN 70 milligrams/deciliter  ondansetron Injectable 4 milliGRAM(s) IV Push once PRN Nausea and/or Vomiting        SOCIAL HISTORY:    [ ] Non-smoker  [ ] Smoker  [ ] Alcohol     REVIEW OF SYSTEMS:  CONSTITUTIONAL: No fever, weight loss, or fatigue  CARDIOLOGY: PAtient denies chest pain, shortness of breath or syncopal episodes.   RESPIRATORY: denies shortness of breath, wheezeing.   NEUROLOGICAL: NO weakness, no focal deficits to report.  ENDOCRINOLOGICAL: no recent change in diabetic medications.   GI: no BRBPR, no N,V,diarrhea.    PSYCHIATRY: normal mood and affect  HEENT: no nasal discharge, no ecchymosis  SKIN: no ecchymosis, no breakdown  MUSCULOSKELETAL: Full range of motion x4.      PHYSICAL EXAM:  T(C): 36.2 (02-27-20 @ 04:40), Max: 36.7 (02-26-20 @ 21:25)  HR: 88 (02-27-20 @ 04:40) (71 - 88)  BP: 123/64 (02-27-20 @ 04:40) (109/56 - 133/64)  RR: 18 (02-27-20 @ 04:40) (18 - 18)  SpO2: 96% (02-27-20 @ 07:43) (96% - 96%)  Wt(kg): --  I&O's Summary    26 Feb 2020 07:01  -  27 Feb 2020 07:00  --------------------------------------------------------  IN: 0 mL / OUT: 1120 mL / NET: -1120 mL      Daily     Daily     General Appearance: Normal	  Cardiovascular: Normal S1 S2, No JVD, No murmurs, No edema  Respiratory: Lungs clear to auscultation	  Psychiatry: A & O x 3, Mood & affect appropriate  Gastrointestinal:  Soft, Non-tender  Skin: No rashes, No ecchymoses, No cyanosis	  Neurologic: Non-focal  Extremities/MsK: Normal range of motion, No clubbing, cyanosis or edema  Vascular: Peripheral pulses palpable 2+ bilaterally        LABS:	 	                        14.3   22.67 )-----------( 285      ( 27 Feb 2020 05:34 )             49.5     02-27    138  |  95<L>  |  21<H>  ----------------------------<  172<H>  4.2   |  21  |  0.7  02-26    136  |  95<L>  |  20  ----------------------------<  158<H>  4.0   |  26  |  0.6<L>    Ca    9.6      27 Feb 2020 05:34  Ca    9.5      26 Feb 2020 22:00  Mg     1.9     02-27  Mg     1.9     02-26    TPro  7.4  /  Alb  3.5  /  TBili  2.8<H>  /  DBili  x   /  AST  43<H>  /  ALT  22  /  AlkPhos  264<H>  02-27  TPro  7.3  /  Alb  3.5  /  TBili  2.1<H>  /  DBili  x   /  AST  23  /  ALT  16  /  AlkPhos  223<H>  02-26      ECG: V paced    RADIOLOGY:  US Abdomen Limited (02.26.20):  Hydropic gallbladder filled with stones and sludge. No definite findings of acute cholecystitis.   No biliary ductal dilatation.      ECHO (02.13.20):   1. Left ventricular ejection fraction, by visual estimation, is 25 to 30%.   2. Severely decreased segmental left ventricular systolic function.   3. Moderate to severely increased left ventricular internal cavity size.   4. Mild to moderate mitral valve regurgitation.   5. Mild-moderate tricuspid regurgitation.   6. Mild aortic regurgitation.   7. Estimated pulmonary artery systolic pressure is 51.8 mmHg assuming a right atrial pressure of 15 mmHg, which is consistent with moderate pulmonary hypertension. CHIEF COMPLAINT:Patient is a 64y old  Male who presents with a chief complaint of Altered mental status (27 Feb 2020 09:48)      HISTORY OF PRESENT ILLNESS:   HPI:  [64 year old gentleman]    CC: Altered mental status     PM/SH: HTN, DLD, CAD s/p CABG, HFrEF (25-30% on ECHO 2/2020, 20-25% on MUGA scan in 12/2019) s/p st carlyle BiV placement 2/2020, DM (on insulin pump), pulmonary hypertension, Celiac disease, diverticulitis, s/p right total knee replacement October 2019 complicated by infection s/p abx course w/ PICC and  right thigh with skin graft, right hip fracture s/p ORIF 2/2020     History of Present Illness goes back to the day of admission. The patient as send here from NH where he was for rehab after his recent ORIF. Per the records, patient was agitated and combative with staff. Patient was claiming "there was a person sneaking around by his room and he wanted the staff from NH to call the Police." Patient also stated he was scared so he was screaming.  Per the patient he does not know exactly what happened. He said he realized he was acting weird but was not aware of why. He said he got into confrontations with the nursing staff, the police and the EMS because they were being aggressive towards him.   Patient otherwise denies any hallucination/ HA/dizziness/chest pain/sob/abd pain/n/v/d.    In the ED, vitals were stable. Labs showed elevated WBC (it has been before). Na was 130 (became low after last admission), K was 5.2, creatinine was 1.9 from 0.8. Lactate was 2.4. UA was grossly positive. Per the ED team and signout and patient fiedls was changed and sample was from urine. Per their notes, the patient refused fields removal. (21 Feb 2020 04:11)    PAST MEDICAL & SURGICAL HISTORY:  Diabetic neuropathy  STEMI (ST elevation myocardial infarction)  Diverticulitis  MRSA bacteremia  History of celiac disease  CHF (congestive heart failure): EF ~ 25%  HTN (hypertension)  Diabetes: on insulin pump  Blood clot due to device, implant, or graft: was on blood thinners  HLD (hyperlipidemia)  Osteoarthritis  Atherosclerosis of coronary artery: CAD (coronary artery disease)  Status post percutaneous transluminal coronary angioplasty: in 2012  History of open reduction and internal fixation (ORIF) procedure  Surgery, elective: Right shoulder  Surgery, elective: right knee wound debridement  S/P TKR (total knee replacement), right: with infection Mrsa   per pt he was cleared from MRSA infection  S/P CABG x 1: 2018  Stented coronary artery: 10/18 heart attack  INFERIOR WALL MI  Other postprocedural status: Fixation hardware in foot LEFT    FAMILY HISTORY:  Family history of allergies: daughter  Family history of heart disease (Mother)    Allergies    ACE inhibitors (Hives)  enalapril (Hives)    Intolerances    	  Home Medications:  aspirin 81 mg oral delayed release tablet: 1 tab(s) orally once a day (31 Jan 2020 06:21)  atorvastatin 40 mg oral tablet: 1 tab(s) orally once a day (31 Jan 2020 06:21)  digoxin 125 mcg (0.125 mg) oral tablet: 1 tab(s) orally once a day (31 Jan 2020 06:21)  DULoxetine 30 mg oral delayed release capsule: 3 cap(s) orally once a day (31 Jan 2020 08:32)  furosemide 40 mg oral tablet: 1 tab(s) orally once a day (31 Jan 2020 06:21)  Jardiance:  (31 Jan 2020 08:33)  metOLazone 2.5 mg oral tablet: 1 tab(s) orally once a day, 30 min before lasix (31 Jan 2020 06:21)  Metoprolol Succinate ER 50 mg oral tablet, extended release: 3 tab(s) orally once a day (31 Jan 2020 06:21)  pantoprazole 40 mg oral delayed release tablet: 1 tab(s) orally once a day (31 Jan 2020 06:21)  prasugrel 10 mg oral tablet: 1 tab(s) orally once a day (31 Jan 2020 06:21)  tamsulosin 0.4 mg oral capsule: 1 cap(s) orally once a day (at bedtime) (13 Feb 2020 11:05)  Trulicity Pen: subcutaneous once a week (31 Jan 2020 08:33)  Zetia 10 mg oral tablet: 1 tab(s) orally once a day (at bedtime) (31 Jan 2020 06:21)    MEDICATIONS  (STANDING):  aspirin enteric coated 81 milliGRAM(s) Oral daily  atorvastatin Oral Tab/Cap - Peds 40 milliGRAM(s) Oral daily  BACItracin   Ointment 1 Application(s) Topical two times a day  cefTRIAXone   IVPB 1000 milliGRAM(s) IV Intermittent every 24 hours  cefTRIAXone   IVPB      chlorhexidine 4% Liquid 1 Application(s) Topical <User Schedule>  dextrose 5%. 1000 milliLiter(s) (50 mL/Hr) IV Continuous <Continuous>  dextrose 50% Injectable 12.5 Gram(s) IV Push once  dextrose 50% Injectable 25 Gram(s) IV Push once  dextrose 50% Injectable 25 Gram(s) IV Push once  digoxin     Tablet 0.125 milliGRAM(s) Oral daily  DULoxetine 90 milliGRAM(s) Oral daily  heparin  Injectable 5000 Unit(s) SubCutaneous every 8 hours  insulin glargine Injectable (LANTUS) 20 Unit(s) SubCutaneous at bedtime  insulin lispro (HumaLOG) corrective regimen sliding scale   SubCutaneous three times a day before meals  insulin lispro Injectable (HumaLOG) 6 Unit(s) SubCutaneous three times a day before meals  metoprolol succinate ER 50 milliGRAM(s) Oral daily  metroNIDAZOLE  IVPB      metroNIDAZOLE  IVPB 500 milliGRAM(s) IV Intermittent every 8 hours  pantoprazole    Tablet 40 milliGRAM(s) Oral at bedtime  prasugrel 10 milliGRAM(s) Oral daily  sodium chloride 0.9%. 1000 milliLiter(s) (75 mL/Hr) IV Continuous <Continuous>  tamsulosin Oral Tab/Cap - Peds 0.4 milliGRAM(s) Oral at bedtime    MEDICATIONS  (PRN):  acetaminophen   Tablet .. 650 milliGRAM(s) Oral every 6 hours PRN Temp greater or equal to 38C (100.4F), Moderate Pain (4 - 6)  aluminum hydroxide/magnesium hydroxide/simethicone Suspension 30 milliLiter(s) Oral every 6 hours PRN Dyspepsia  dextrose 40% Gel 15 Gram(s) Oral once PRN Blood Glucose LESS THAN 70 milliGRAM(s)/deciliter  glucagon  Injectable 1 milliGRAM(s) IntraMuscular once PRN Glucose LESS THAN 70 milligrams/deciliter  ondansetron Injectable 4 milliGRAM(s) IV Push once PRN Nausea and/or Vomiting        SOCIAL HISTORY:    [X] Non-smoker  [X] No Alcohol     REVIEW OF SYSTEMS:  CONSTITUTIONAL: No fever, weight loss, or fatigue  CARDIOLOGY: PAtient denies chest pain, shortness of breath or syncopal episodes.   RESPIRATORY: denies shortness of breath, wheezing   NEUROLOGICAL: NO weakness, no focal deficits to report.  ENDOCRINOLOGICAL: no recent change in diabetic medications.   GI: no BRBPR, no N,V,diarrhea., (+) abdominal pain  PSYCHIATRY: normal mood and affect  HEENT: no nasal discharge, no ecchymosis  SKIN: no ecchymosis, no breakdown  MUSCULOSKELETAL: Full range of motion x4.      PHYSICAL EXAM:  T(C): 36.2 (02-27-20 @ 04:40), Max: 36.7 (02-26-20 @ 21:25)  HR: 88 (02-27-20 @ 04:40) (71 - 88)  BP: 123/64 (02-27-20 @ 04:40) (109/56 - 133/64)  RR: 18 (02-27-20 @ 04:40) (18 - 18)  SpO2: 96% (02-27-20 @ 07:43) (96% - 96%)  Wt(kg): --  I&O's Summary    26 Feb 2020 07:01  -  27 Feb 2020 07:00  --------------------------------------------------------  IN: 0 mL / OUT: 1120 mL / NET: -1120 mL      Daily     Daily     General Appearance: Normal	  Cardiovascular: Normal S1 S2, No JVD, No murmurs, No edema  Respiratory: Lungs clear to auscultation	  Psychiatry: A & O x 3, Mood & affect appropriate  Gastrointestinal:  Soft, tender to palpation RUQ  Skin: No rashes, No ecchymoses, No cyanosis	  Neurologic: Non-focal  Extremities/MsK: Normal range of motion, No clubbing, cyanosis or edema  Vascular: Peripheral pulses palpable 2+ bilaterally        LABS:	 	                        14.3   22.67 )-----------( 285      ( 27 Feb 2020 05:34 )             49.5     02-27    138  |  95<L>  |  21<H>  ----------------------------<  172<H>  4.2   |  21  |  0.7  02-26    136  |  95<L>  |  20  ----------------------------<  158<H>  4.0   |  26  |  0.6<L>    Ca    9.6      27 Feb 2020 05:34  Ca    9.5      26 Feb 2020 22:00  Mg     1.9     02-27  Mg     1.9     02-26    TPro  7.4  /  Alb  3.5  /  TBili  2.8<H>  /  DBili  x   /  AST  43<H>  /  ALT  22  /  AlkPhos  264<H>  02-27  TPro  7.3  /  Alb  3.5  /  TBili  2.1<H>  /  DBili  x   /  AST  23  /  ALT  16  /  AlkPhos  223<H>  02-26      ECG: V paced    RADIOLOGY:  US Abdomen Limited (02.26.20):  Hydropic gallbladder filled with stones and sludge. No definite findings of acute cholecystitis.   No biliary ductal dilatation.      ECHO (02.13.20):   1. Left ventricular ejection fraction, by visual estimation, is 25 to 30%.   2. Severely decreased segmental left ventricular systolic function.   3. Moderate to severely increased left ventricular internal cavity size.   4. Mild to moderate mitral valve regurgitation.   5. Mild-moderate tricuspid regurgitation.   6. Mild aortic regurgitation.   7. Estimated pulmonary artery systolic pressure is 51.8 mmHg assuming a right atrial pressure of 15 mmHg, which is consistent with moderate pulmonary hypertension.

## 2020-02-27 NOTE — CONSULT NOTE ADULT - SUBJECTIVE AND OBJECTIVE BOX
INTERVENTIONAL RADIOLOGY CONSULT:     Procedure Requested: Percutaneous cholecystostomy    HPI:  [64 year old gentleman]    CC: Altered mental status     PM/SH: HTN, DLD, CAD s/p CABG, HFrEF (25-30% on ECHO 2/2020, 20-25% on MUGA scan in 12/2019) s/p st carlyle BiV placement 2/2020, DM (on insulin pump), pulmonary hypertension, Celiac disease, diverticulitis, s/p right total knee replacement October 2019 complicated by infection s/p abx course w/ PICC and  right thigh with skin graft, right hip fracture s/p ORIF 2/2020     History of Present Illness goes back to the day of admission. The patient as send here from NH where he was for rehab after his recent ORIF. Per the records, patient was agitated and combative with staff. Patient was claiming "there was a person sneaking around by his room and he wanted the staff from NH to call the Police." Patient also stated he was scared so he was screaming.  Per the patient he does not know exactly what happened. He said he realized he was acting weird but was not aware of why. He said he got into confrontations with the nursing staff, the police and the EMS because they were being aggressive towards him.   Patient otherwise denies any hallucination/ HA/dizziness/chest pain/sob/abd pain/n/v/d.    In the ED, vitals were stable. Labs showed elevated WBC (it has been before). Na was 130 (became low after last admission), K was 5.2, creatinine was 1.9 from 0.8. Lactate was 2.4. UA was grossly positive. Per the ED team and signout and patient fields was changed and sample was from urine. Per their notes, the patient refused fields removal. (21 Feb 2020 04:11)      PAST MEDICAL & SURGICAL HISTORY:  Diabetic neuropathy  STEMI (ST elevation myocardial infarction)  Diverticulitis  MRSA bacteremia  History of celiac disease  CHF (congestive heart failure): EF ~ 25%  HTN (hypertension)  Diabetes: on insulin pump  Blood clot due to device, implant, or graft: was on blood thinners  HLD (hyperlipidemia)  Osteoarthritis  Atherosclerosis of coronary artery: CAD (coronary artery disease)  Status post percutaneous transluminal coronary angioplasty: in 2012  History of open reduction and internal fixation (ORIF) procedure  Surgery, elective: Right shoulder  Surgery, elective: right knee wound debridement  S/P TKR (total knee replacement), right: with infection Mrsa   per pt he was cleared from MRSA infection  S/P CABG x 1: 2018  Stented coronary artery: 10/18 heart attack  INFERIOR WALL MI  Other postprocedural status: Fixation hardware in foot LEFT      MEDICATIONS  (STANDING):  aspirin enteric coated 81 milliGRAM(s) Oral daily  atorvastatin Oral Tab/Cap - Peds 40 milliGRAM(s) Oral daily  BACItracin   Ointment 1 Application(s) Topical two times a day  cefTRIAXone   IVPB 1000 milliGRAM(s) IV Intermittent every 24 hours  cefTRIAXone   IVPB      chlorhexidine 4% Liquid 1 Application(s) Topical <User Schedule>  dextrose 5%. 1000 milliLiter(s) (50 mL/Hr) IV Continuous <Continuous>  dextrose 50% Injectable 12.5 Gram(s) IV Push once  dextrose 50% Injectable 25 Gram(s) IV Push once  dextrose 50% Injectable 25 Gram(s) IV Push once  digoxin     Tablet 0.125 milliGRAM(s) Oral daily  DULoxetine 90 milliGRAM(s) Oral daily  heparin  Injectable 5000 Unit(s) SubCutaneous every 8 hours  insulin glargine Injectable (LANTUS) 20 Unit(s) SubCutaneous at bedtime  insulin lispro (HumaLOG) corrective regimen sliding scale   SubCutaneous three times a day before meals  insulin lispro Injectable (HumaLOG) 6 Unit(s) SubCutaneous three times a day before meals  metoprolol succinate ER 50 milliGRAM(s) Oral daily  metroNIDAZOLE  IVPB      metroNIDAZOLE  IVPB 500 milliGRAM(s) IV Intermittent every 8 hours  pantoprazole    Tablet 40 milliGRAM(s) Oral at bedtime  prasugrel 10 milliGRAM(s) Oral daily  sodium chloride 0.9%. 1000 milliLiter(s) (75 mL/Hr) IV Continuous <Continuous>  tamsulosin Oral Tab/Cap - Peds 0.4 milliGRAM(s) Oral at bedtime    MEDICATIONS  (PRN):  acetaminophen   Tablet .. 650 milliGRAM(s) Oral every 6 hours PRN Temp greater or equal to 38C (100.4F), Moderate Pain (4 - 6)  aluminum hydroxide/magnesium hydroxide/simethicone Suspension 30 milliLiter(s) Oral every 6 hours PRN Dyspepsia  dextrose 40% Gel 15 Gram(s) Oral once PRN Blood Glucose LESS THAN 70 milliGRAM(s)/deciliter  glucagon  Injectable 1 milliGRAM(s) IntraMuscular once PRN Glucose LESS THAN 70 milligrams/deciliter  ondansetron Injectable 4 milliGRAM(s) IV Push once PRN Nausea and/or Vomiting      Allergies    ACE inhibitors (Hives)  enalapril (Hives)    Intolerances    FAMILY HISTORY:  Family history of allergies: daughter  Family history of heart disease (Mother)      Physical Exam:   Vital Signs Last 24 Hrs  T(C): 35.6 (27 Feb 2020 13:32), Max: 36.7 (26 Feb 2020 21:25)  T(F): 96 (27 Feb 2020 13:32), Max: 98 (26 Feb 2020 21:25)  HR: 92 (27 Feb 2020 13:32) (85 - 92)  BP: 124/56 (27 Feb 2020 13:32) (123/64 - 133/64)  BP(mean): --  RR: 18 (27 Feb 2020 13:32) (18 - 18)  SpO2: 96% (27 Feb 2020 07:43) (96% - 96%)      Labs:                         14.3   22.67 )-----------( 285      ( 27 Feb 2020 05:34 )             49.5     02-27    138  |  95<L>  |  21<H>  ----------------------------<  172<H>  4.2   |  21  |  0.7    Ca    9.6      27 Feb 2020 05:34  Mg     1.9     02-27    TPro  7.4  /  Alb  3.5  /  TBili  2.8<H>  /  DBili  x   /  AST  43<H>  /  ALT  22  /  AlkPhos  264<H>  02-27        Pertinent labs:                      14.3   22.67 )-----------( 285      ( 27 Feb 2020 05:34 )             49.5       02-27    138  |  95<L>  |  21<H>  ----------------------------<  172<H>  4.2   |  21  |  0.7    Ca    9.6      27 Feb 2020 05:34  Mg     1.9     02-27    TPro  7.4  /  Alb  3.5  /  TBili  2.8<H>  /  DBili  x   /  AST  43<H>  /  ALT  22  /  AlkPhos  264<H>  02-27          Radiology & Additional Studies:     Radiology imaging reviewed.       ASSESSMENT/ PLAN:   64M with PMHx CHF s/p AICD 2/11, right knee replacement with infection, recent Right hip ORIF, now with RUQ abdominal pain.  US shows distended gallbladder containing cholelithiasis and sludge. Negative sonographic khan's sign.  CT shows trace fat stranding surroundg the gallbladder.  Pt is HD stable.   WBC 22.  LFTs mildly elevated.   - Please obtain HIDA scan to confirm  - will follow up after results  - please call with any q's       Risks, benefits, and alternatives to treatment discussed. All questions answered with understanding.    Thank you for the courtesy of this consult, please call [phone] with any further questions.

## 2020-02-27 NOTE — CONSULT NOTE ADULT - ASSESSMENT
64 year old male with PMH of HTN, DLD, CAD s/p CABG, HFrEF (25-30% on ECHO 2/2020) s/p st carlyle BiV placement 2/2020, DM (on insulin pump), pulmonary hypertension, Celiac disease, diverticulitis, s/p right total knee replacement, right hip fracture s/p ORIF 2/2020 presents with altered mental status. Cardiology consulted for pre-op clearance for possible GB removal.     # Pre-Op clearance for Gall bladder removal  - Hx CAD s/p CABG (2018) and PCI with RCA stent (2013)  - no active chest pain, not in decompensated HF, no malignant arrythmia   - Trop (on admission) 0.06 <-- 0.07 - likely secondary to MOISES and demand ischemia   - RCRI score 3  - METS >4  - c/w aspirin, metoprolol, atorvastatin  - ok to hold Effient 5 days prior to surgery   - given above patient is at intermediate risk for cardiac event for intermediate risk procedure     # Chronic HFrEF s/p CRT-D  - Euvolemic on exam   - MUGA scan (12/19): with EF of 20-25%   - c/w metoprolol, digoxin  - restart lasix and metolazone as Cr. back to baseline  - pt not on ACEI due to hx of angioedema 64 year old male with PMH of HTN, DLD, CAD s/p CABG, HFrEF (25-30% on ECHO 2/2020) s/p st carlyle BiV placement 2/2020, DM (on insulin pump), pulmonary hypertension, Celiac disease, diverticulitis, s/p right total knee replacement, right hip fracture s/p ORIF 2/2020 presents with altered mental status. Cardiology consulted for pre-op clearance for possible GB removal.     # Pre-Op clearance for Gall bladder removal  - Hx CAD s/p CABG (2018) and PCI with RCA stent (2013)  - no active chest pain, not in decompensated HF, no malignant arrythmia   - Trop (on admission) 0.06 <-- 0.07 - likely secondary to MOISES and demand ischemia   - RCRI score 3  - METS >4  - c/w aspirin, metoprolol, atorvastatin  - ok to hold Effient 5 days prior to surgery   - given above patient is at intermediate risk for cardiac event for intermediate risk procedure     # Chronic HFrEF s/p CRT-D  - Euvolemic on exam   - MUGA scan (12/19): with EF of 20-25%   - c/w metoprolol, digoxin  - restart lasix and metolazone as Cr. back to baseline  - pt not on ACEI due to hx of angioedema  - interrogate ppm 64 year old male with PMH of HTN, DLD, CAD s/p CABG, HFrEF (25-30% on ECHO 2/2020) s/p st carlyle BiV placement 2/2020, DM (on insulin pump), pulmonary hypertension, Celiac disease, diverticulitis, s/p right total knee replacement, right hip fracture s/p ORIF 2/2020 presents with altered mental status. Cardiology consulted for pre-op clearance for possible GB removal.     # Pre-Op clearance for Gall bladder removal  - Hx CAD s/p STEMI, s/p CABG (LIMA to LAD) s/p PCI of RCA with in-stent thrombosis in Oct 2018 --> on DAPT  - no active chest pain, not in decompensated HF, no malignant arrythmia   - Trop (on admission) 0.06 <-- 0.07 - likely secondary to MOISES and demand ischemia   - RCRI score 3  - METS <4  - c/w aspirin, metoprolol, atorvastatin  - may hold Effient 5 days prior to surgery   - given above patient is at intermediate risk for cardiac event for intermediate risk procedure     # Chronic HFrEF s/p CRT-D  - Euvolemic on exam   - MUGA scan (12/19): with EF of 20-25%   - c/w metoprolol, digoxin  - restart lasix and metolazone as Cr. back to baseline  - pt not on ACEI due to hx of angioedema  - interrogate ppm 64 year old male with PMH of HTN, DLD, CAD s/p CABG, HFrEF (25-30% on ECHO 2/2020) s/p st carlyle BiV placement 2/2020, DM (on insulin pump), pulmonary hypertension, Celiac disease, diverticulitis, s/p right total knee replacement, right hip fracture s/p ORIF 2/2020 presents with altered mental status. Cardiology consulted for pre-op clearance for possible GB removal.     # Pre-Op clearance for Gall bladder removal vs IR drainage  - Hx CAD s/p STEMI, s/p CABG (LIMA to LAD) s/p PCI of RCA with in-stent thrombosis in Oct 2018 --> on DAPT  - no active chest pain, not in decompensated HF, no malignant arrythmia   - Trop (on admission) 0.06 <-- 0.07 - likely secondary to MOISES and demand ischemia   - RCRI score 3  - METS <4  - c/w aspirin, metoprolol, atorvastatin  - may hold Effient 5 days prior to procedure and restart after  - given above patient is at intermediate risk for cardiac event for intermediate risk procedure   no need for further cardiac workup prior to above mentionned procedure( either cholecystectomy of IR drainage)    # Chronic HFrEF s/p CRT-D  - Euvolemic on exam   - MUGA scan (12/19): with EF of 20-25%   - c/w metoprolol, digoxin  - restart lasix and metolazone as Cr. back to baseline  - pt not on ACEI due to hx of angioedema 64 year old male with PMH of HTN, DLD, CAD s/p CABG, HFrEF (25-30% on ECHO 2/2020) s/p st carlyle BiV placement 2/2020, DM (on insulin pump), pulmonary hypertension, Celiac disease, diverticulitis, s/p right total knee replacement, right hip fracture s/p ORIF 2/2020 presents with altered mental status. Cardiology consulted for pre-op clearance for possible GB removal.     # Pre-Op clearance for Gall bladder removal vs IR drainage  - Hx CAD s/p STEMI, s/p CABG (LIMA to LAD) s/p PCI of RCA with in-stent thrombosis in Oct 2018 --> on DAPT  - no active chest pain, not in decompensated HF, no malignant arrythmia   - Trop (on admission) 0.06 <-- 0.07 - likely secondary to MOISES and demand ischemia   - RCRI score 3  - METS <4  - c/w aspirin, metoprolol, atorvastatin  - may hold Effient 3-5 days prior to procedure and restart after  - given above patient is at intermediate risk for cardiac event for intermediate risk procedure   no need for further cardiac workup prior to above mentionned procedure( either cholecystectomy of IR drainage)    # Chronic HFrEF s/p CRT-D  - Euvolemic on exam   - MUGA scan (12/19): with EF of 20-25%   - c/w metoprolol, digoxin  - restart lasix and metolazone as Cr. back to baseline  - pt not on ACEI due to hx of angioedema

## 2020-02-27 NOTE — CHART NOTE - NSCHARTNOTEFT_GEN_A_CORE
Upon Nutritional Assessment by the Registered Dietitian your patient was determined to meet criteria / has evidence of the following diagnosis/diagnoses:          [ ]  Mild Protein Calorie Malnutrition        [x]  Moderate Protein Calorie Malnutrition        [ ] Severe Protein Calorie Malnutrition        [ ] Unspecified Protein Calorie Malnutrition        [ ] Underweight / BMI <19        [ ] Morbid Obesity / BMI > 40      Findings as based on:  •  Comprehensive nutrition assessment and consultation  •  Nutrition focused physical exam   •  Food acceptance and intake status from observations by staff  •  Follow up    Moderate Protein-Calorie Malnutrition Indicators:  1. Moderate muscle loss to the temporal/clavicle regions  2. <75% of energy needs met for >7 days       Treatment:    The following diet has been recommended:  1. When medically feasible to resume diet, please activate pending diet order from 2/24    PROVIDER Section:     By signing this assessment you are acknowledging and agree with the diagnosis/diagnoses assigned by the Registered Dietitian    Comments:
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
## 2020-02-27 NOTE — CONSULT NOTE ADULT - SUBJECTIVE AND OBJECTIVE BOX
General Surgery Consultation Note  =====================================================  HPI: 64y Male  HPI:  [64 year old gentleman]    CC: Altered mental status     PM/SH: HTN, DLD, CAD s/p CABG, HFrEF (25-30% on ECHO 2/2020, 20-25% on MUGA scan in 12/2019) s/p st carlyle BiV placement 2/2020, DM (on insulin pump), pulmonary hypertension, Celiac disease, diverticulitis, s/p right total knee replacement October 2019 complicated by infection s/p abx course w/ PICC and  right thigh with skin graft, right hip fracture s/p ORIF 2/2020     History of Present Illness goes back to the day of admission. The patient as send here from [facility] where he was for rehab after his recent ORIF. Per the records, patient was agitated and combative with staff. Patient was claiming "there was a person sneaking around by his room and he wanted the staff from NH to call the Police." Patient also stated he was scared so he was screaming.  Per the patient he does not know exactly what happened. He said he realized he was acting weird but was not aware of why. He said he got into confrontations with the nursing staff, the police and the EMS because they were being aggressive towards him.   Patient otherwise denies any hallucination/ HA/dizziness/chest pain/sob/abd pain/n/v/d.    In the ED, vitals were stable. Labs showed elevated WBC (it has been before). Na was 130 (became low after last admission), K was 5.2, creatinine was 1.9 from 0.8. Lactate was 2.4. UA was grossly positive. Per the ED team and signout and patient fields was changed and sample was from urine. Per their notes, the patient refused fields removal. (21 Feb 2020 04:11)      Surgery Information  OR time:      EBL:          IV Fluids:       Blood Products:   UOP:        PAST MEDICAL & SURGICAL HISTORY:  Diabetic neuropathy  STEMI (ST elevation myocardial infarction)  Diverticulitis  MRSA bacteremia  History of celiac disease  CHF (congestive heart failure): EF ~ 25%  HTN (hypertension)  Diabetes: on insulin pump  Blood clot due to device, implant, or graft: was on blood thinners  HLD (hyperlipidemia)  Osteoarthritis  Atherosclerosis of coronary artery: CAD (coronary artery disease)  Status post percutaneous transluminal coronary angioplasty: in 2012  History of open reduction and internal fixation (ORIF) procedure  Surgery, elective: Right shoulder  Surgery, elective: right knee wound debridement  S/P TKR (total knee replacement), right: with infection Mrsa   per pt he was cleared from MRSA infection  S/P CABG x 1: 2018  Stented coronary artery: 10/18 heart attack  INFERIOR WALL MI  Other postprocedural status: Fixation hardware in foot LEFT    Home Meds: Home Medications:  aspirin 81 mg oral delayed release tablet: 1 tab(s) orally once a day (31 Jan 2020 06:21)  atorvastatin 40 mg oral tablet: 1 tab(s) orally once a day (31 Jan 2020 06:21)  digoxin 125 mcg (0.125 mg) oral tablet: 1 tab(s) orally once a day (31 Jan 2020 06:21)  DULoxetine 30 mg oral delayed release capsule: 3 cap(s) orally once a day (31 Jan 2020 08:32)  furosemide 40 mg oral tablet: 1 tab(s) orally once a day (31 Jan 2020 06:21)  Jardiance:  (31 Jan 2020 08:33)  metOLazone 2.5 mg oral tablet: 1 tab(s) orally once a day, 30 min before lasix (31 Jan 2020 06:21)  Metoprolol Succinate ER 50 mg oral tablet, extended release: 3 tab(s) orally once a day (31 Jan 2020 06:21)  pantoprazole 40 mg oral delayed release tablet: 1 tab(s) orally once a day (31 Jan 2020 06:21)  prasugrel 10 mg oral tablet: 1 tab(s) orally once a day (31 Jan 2020 06:21)  tamsulosin 0.4 mg oral capsule: 1 cap(s) orally once a day (at bedtime) (13 Feb 2020 11:05)  Trulicity Pen: subcutaneous once a week (31 Jan 2020 08:33)  Zetia 10 mg oral tablet: 1 tab(s) orally once a day (at bedtime) (31 Jan 2020 06:21)    Allergies: Allergies    ACE inhibitors (Hives)  enalapril (Hives)    Intolerances      Soc:   Advanced Directives: Presumed Full Code     ROS:    REVIEW OF SYSTEMS    [ ] A ten-point review of systems was otherwise negative except as noted.  [ ] Due to altered mental status/intubation, subjective information were not able to be obtained from the patient. History was obtained, to the extent possible, from review of the chart and collateral sources of information.      CURRENT MEDICATIONS:   --------------------------------------------------------------------------------------  Neurologic Medications  acetaminophen   Tablet .. 650 milliGRAM(s) Oral every 6 hours PRN Temp greater or equal to 38C (100.4F), Moderate Pain (4 - 6)  DULoxetine 90 milliGRAM(s) Oral daily  ondansetron Injectable 4 milliGRAM(s) IV Push once PRN Nausea and/or Vomiting    Respiratory Medications    Cardiovascular Medications  digoxin     Tablet 0.125 milliGRAM(s) Oral daily  metoprolol succinate ER 50 milliGRAM(s) Oral daily  tamsulosin Oral Tab/Cap - Peds 0.4 milliGRAM(s) Oral at bedtime    Gastrointestinal Medications  aluminum hydroxide/magnesium hydroxide/simethicone Suspension 30 milliLiter(s) Oral every 6 hours PRN Dyspepsia  dextrose 5%. 1000 milliLiter(s) IV Continuous <Continuous>  pantoprazole    Tablet 40 milliGRAM(s) Oral at bedtime  sodium chloride 0.9%. 1000 milliLiter(s) IV Continuous <Continuous>    Genitourinary Medications    Hematologic/Oncologic Medications  aspirin enteric coated 81 milliGRAM(s) Oral daily  heparin  Injectable 5000 Unit(s) SubCutaneous every 8 hours  prasugrel 10 milliGRAM(s) Oral daily    Antimicrobial/Immunologic Medications  cefTRIAXone   IVPB 1000 milliGRAM(s) IV Intermittent every 24 hours  cefTRIAXone   IVPB      metroNIDAZOLE  IVPB      metroNIDAZOLE  IVPB 500 milliGRAM(s) IV Intermittent every 8 hours    Endocrine/Metabolic Medications  atorvastatin Oral Tab/Cap - Peds 40 milliGRAM(s) Oral daily  dextrose 40% Gel 15 Gram(s) Oral once PRN Blood Glucose LESS THAN 70 milliGRAM(s)/deciliter  dextrose 50% Injectable 12.5 Gram(s) IV Push once  dextrose 50% Injectable 25 Gram(s) IV Push once  dextrose 50% Injectable 25 Gram(s) IV Push once  glucagon  Injectable 1 milliGRAM(s) IntraMuscular once PRN Glucose LESS THAN 70 milligrams/deciliter  insulin glargine Injectable (LANTUS) 20 Unit(s) SubCutaneous at bedtime  insulin lispro (HumaLOG) corrective regimen sliding scale   SubCutaneous three times a day before meals  insulin lispro Injectable (HumaLOG) 6 Unit(s) SubCutaneous three times a day before meals    Topical/Other Medications  BACItracin   Ointment 1 Application(s) Topical two times a day  chlorhexidine 4% Liquid 1 Application(s) Topical <User Schedule>    --------------------------------------------------------------------------------------    VITAL SIGNS, INS/OUTS (last 24 hours):  --------------------------------------------------------------------------------------  ICU Vital Signs Last 24 Hrs  T(C): 36.2 (27 Feb 2020 04:40), Max: 36.7 (26 Feb 2020 21:25)  T(F): 97.2 (27 Feb 2020 04:40), Max: 98 (26 Feb 2020 21:25)  HR: 88 (27 Feb 2020 04:40) (85 - 88)  BP: 123/64 (27 Feb 2020 04:40) (123/64 - 133/64)  BP(mean): --  ABP: --  ABP(mean): --  RR: 18 (27 Feb 2020 04:40) (18 - 18)  SpO2: 96% (27 Feb 2020 07:43) (96% - 96%)    I&O's Summary    26 Feb 2020 07:01  -  27 Feb 2020 07:00  --------------------------------------------------------  IN: 0 mL / OUT: 1120 mL / NET: -1120 mL      --------------------------------------------------------------------------------------  PHYSICAL EXAM    General: NAD, AAOx3, calm and cooperative  HEENT: NCAT, DAMARIS, EOMI, Trachea ML, Neck supple  Cardiac: RRR S1, S2, no Murmurs, rubs or gallops  Respiratory: CTAB, normal respiratory effort, breath sounds equal BL, no wheeze, rhonchi or crackles  Abdomen: Soft, non-distended, non-tender, +bowel sounds  Musculoskeletal: Strength 5/5 BL UE/LE, ROM intact, compartments soft  Neuro: Sensation grossly intact and equal throughout, CN II-XII intact, no focal deficits  Vascular: Pulses 2+ throughout, extremities well perfused  Skin: Warm/dry, normal color, no jaundice  Incision/wound: healing well, dressings in place, clean, dry and intact    LABS  --------------------------------------------------------------------------------------  Labs:  CAPILLARY BLOOD GLUCOSE      POCT Blood Glucose.: 183 mg/dL (27 Feb 2020 11:46)  POCT Blood Glucose.: 197 mg/dL (27 Feb 2020 07:54)  POCT Blood Glucose.: 165 mg/dL (27 Feb 2020 01:05)  POCT Blood Glucose.: 151 mg/dL (26 Feb 2020 21:02)  POCT Blood Glucose.: 144 mg/dL (26 Feb 2020 17:05)                          14.3   22.67 )-----------( 285      ( 27 Feb 2020 05:34 )             49.5         02-27    138  |  95<L>  |  21<H>  ----------------------------<  172<H>  4.2   |  21  |  0.7      Magnesium, Serum: 1.9 mg/dL (02-27-20 @ 05:34)      LFTs:             7.4  | 2.8  | 43       ------------------[264     ( 27 Feb 2020 05:34 )  3.5  | x    | 22          Lipase:18     Amylase:x             Coags:                  --------------------------------------------------------------------------------------    OTHER LABS    IMAGING RESULTS General Surgery Consultation Note  =====================================================  HPI: 64y Male PMH HTN, DLD, CHF and AICD placement 2/11 (repeat echo post procedure EF 25-30%, severe systolic dysfcn, mild mod TR, MR, and moderate pulmonary HTN), DM on insulin pump, hx diverticulitis, ischemic CM, diabetic neuropathy and DM on insulin pump, HLD, MI s/p stent 10/2018, celiac disease, and recent hospitalizations for R hip fracture now s/p hip ORIF 1/31/20, right knee replacement c/b infection and skin grafting, liner replacement 11/19 with PICC Abx course, and admissions with altered mental status. After his most recent fall and hip fracture with repair followed by AICD placement, he was sent to a facility where he was noted to have agitation and combativeness, with noted UTI and leukocytosis, diagnosed with metabolic encephalopathy, MOISES, UTI, electrolyte derangements HF, and rising troponins. He         CC: Altered mental status   PM/SH: HTN, DLD, CAD s/p CABG, HFrEF (25-30% on ECHO 2/2020, 20-25% on MUGA scan in 12/2019) s/p st carlyle BiV placement 2/2020, DM (on insulin pump), pulmonary hypertension, Celiac disease, diverticulitis, s/p right total knee replacement October 2019 complicated by infection s/p abx course w/ PICC and  right thigh with skin graft, right hip fracture s/p ORIF 2/2020     History of Present Illness goes back to the day of admission. The patient as send here from NH where he was for rehab after his recent ORIF. Per the records, patient was agitated and combative with staff. Patient was claiming "there was a person sneaking around by his room and he wanted the staff from NH to call the Police." Patient also stated he was scared so he was screaming.  Per the patient he does not know exactly what happened. He said he realized he was acting weird but was not aware of why. He said he got into confrontations with the nursing staff, the police and the EMS because they were being aggressive towards him.   Patient otherwise denies any hallucination/ HA/dizziness/chest pain/sob/abd pain/n/v/d.    In the ED, vitals were stable. Labs showed elevated WBC (it has been before). Na was 130 (became low after last admission), K was 5.2, creatinine was 1.9 from 0.8. Lactate was 2.4. UA was grossly positive. Per the ED team and signout and patient fields was changed and sample was from urine. Per their notes, the patient refused fields removal. (21 Feb 2020 04:11)      Surgery Information  OR time:      EBL:          IV Fluids:       Blood Products:   UOP:        PAST MEDICAL & SURGICAL HISTORY:  Diabetic neuropathy  STEMI (ST elevation myocardial infarction)  Diverticulitis  MRSA bacteremia  History of celiac disease  CHF (congestive heart failure): EF ~ 25%  HTN (hypertension)  Diabetes: on insulin pump  Blood clot due to device, implant, or graft: was on blood thinners  HLD (hyperlipidemia)  Osteoarthritis  Atherosclerosis of coronary artery: CAD (coronary artery disease)  Status post percutaneous transluminal coronary angioplasty: in 2012  History of open reduction and internal fixation (ORIF) procedure  Surgery, elective: Right shoulder  Surgery, elective: right knee wound debridement  S/P TKR (total knee replacement), right: with infection Mrsa   per pt he was cleared from MRSA infection  S/P CABG x 1: 2018  Stented coronary artery: 10/18 heart attack  INFERIOR WALL MI  Other postprocedural status: Fixation hardware in foot LEFT    Home Meds: Home Medications:  aspirin 81 mg oral delayed release tablet: 1 tab(s) orally once a day (31 Jan 2020 06:21)  atorvastatin 40 mg oral tablet: 1 tab(s) orally once a day (31 Jan 2020 06:21)  digoxin 125 mcg (0.125 mg) oral tablet: 1 tab(s) orally once a day (31 Jan 2020 06:21)  DULoxetine 30 mg oral delayed release capsule: 3 cap(s) orally once a day (31 Jan 2020 08:32)  furosemide 40 mg oral tablet: 1 tab(s) orally once a day (31 Jan 2020 06:21)  Jardiance:  (31 Jan 2020 08:33)  metOLazone 2.5 mg oral tablet: 1 tab(s) orally once a day, 30 min before lasix (31 Jan 2020 06:21)  Metoprolol Succinate ER 50 mg oral tablet, extended release: 3 tab(s) orally once a day (31 Jan 2020 06:21)  pantoprazole 40 mg oral delayed release tablet: 1 tab(s) orally once a day (31 Jan 2020 06:21)  prasugrel 10 mg oral tablet: 1 tab(s) orally once a day (31 Jan 2020 06:21)  tamsulosin 0.4 mg oral capsule: 1 cap(s) orally once a day (at bedtime) (13 Feb 2020 11:05)  Trulicity Pen: subcutaneous once a week (31 Jan 2020 08:33)  Zetia 10 mg oral tablet: 1 tab(s) orally once a day (at bedtime) (31 Jan 2020 06:21)    Allergies: Allergies    ACE inhibitors (Hives)  enalapril (Hives)    Intolerances      Soc:   Advanced Directives: Presumed Full Code     ROS:    REVIEW OF SYSTEMS    [ ] A ten-point review of systems was otherwise negative except as noted.  [ ] Due to altered mental status/intubation, subjective information were not able to be obtained from the patient. History was obtained, to the extent possible, from review of the chart and collateral sources of information.      CURRENT MEDICATIONS:   --------------------------------------------------------------------------------------  Neurologic Medications  acetaminophen   Tablet .. 650 milliGRAM(s) Oral every 6 hours PRN Temp greater or equal to 38C (100.4F), Moderate Pain (4 - 6)  DULoxetine 90 milliGRAM(s) Oral daily  ondansetron Injectable 4 milliGRAM(s) IV Push once PRN Nausea and/or Vomiting    Respiratory Medications    Cardiovascular Medications  digoxin     Tablet 0.125 milliGRAM(s) Oral daily  metoprolol succinate ER 50 milliGRAM(s) Oral daily  tamsulosin Oral Tab/Cap - Peds 0.4 milliGRAM(s) Oral at bedtime    Gastrointestinal Medications  aluminum hydroxide/magnesium hydroxide/simethicone Suspension 30 milliLiter(s) Oral every 6 hours PRN Dyspepsia  dextrose 5%. 1000 milliLiter(s) IV Continuous <Continuous>  pantoprazole    Tablet 40 milliGRAM(s) Oral at bedtime  sodium chloride 0.9%. 1000 milliLiter(s) IV Continuous <Continuous>    Genitourinary Medications    Hematologic/Oncologic Medications  aspirin enteric coated 81 milliGRAM(s) Oral daily  heparin  Injectable 5000 Unit(s) SubCutaneous every 8 hours  prasugrel 10 milliGRAM(s) Oral daily    Antimicrobial/Immunologic Medications  cefTRIAXone   IVPB 1000 milliGRAM(s) IV Intermittent every 24 hours  cefTRIAXone   IVPB      metroNIDAZOLE  IVPB      metroNIDAZOLE  IVPB 500 milliGRAM(s) IV Intermittent every 8 hours    Endocrine/Metabolic Medications  atorvastatin Oral Tab/Cap - Peds 40 milliGRAM(s) Oral daily  dextrose 40% Gel 15 Gram(s) Oral once PRN Blood Glucose LESS THAN 70 milliGRAM(s)/deciliter  dextrose 50% Injectable 12.5 Gram(s) IV Push once  dextrose 50% Injectable 25 Gram(s) IV Push once  dextrose 50% Injectable 25 Gram(s) IV Push once  glucagon  Injectable 1 milliGRAM(s) IntraMuscular once PRN Glucose LESS THAN 70 milligrams/deciliter  insulin glargine Injectable (LANTUS) 20 Unit(s) SubCutaneous at bedtime  insulin lispro (HumaLOG) corrective regimen sliding scale   SubCutaneous three times a day before meals  insulin lispro Injectable (HumaLOG) 6 Unit(s) SubCutaneous three times a day before meals    Topical/Other Medications  BACItracin   Ointment 1 Application(s) Topical two times a day  chlorhexidine 4% Liquid 1 Application(s) Topical <User Schedule>    --------------------------------------------------------------------------------------    VITAL SIGNS, INS/OUTS (last 24 hours):  --------------------------------------------------------------------------------------  ICU Vital Signs Last 24 Hrs  T(C): 36.2 (27 Feb 2020 04:40), Max: 36.7 (26 Feb 2020 21:25)  T(F): 97.2 (27 Feb 2020 04:40), Max: 98 (26 Feb 2020 21:25)  HR: 88 (27 Feb 2020 04:40) (85 - 88)  BP: 123/64 (27 Feb 2020 04:40) (123/64 - 133/64)  BP(mean): --  ABP: --  ABP(mean): --  RR: 18 (27 Feb 2020 04:40) (18 - 18)  SpO2: 96% (27 Feb 2020 07:43) (96% - 96%)    I&O's Summary    26 Feb 2020 07:01  -  27 Feb 2020 07:00  --------------------------------------------------------  IN: 0 mL / OUT: 1120 mL / NET: -1120 mL      --------------------------------------------------------------------------------------  PHYSICAL EXAM    General: NAD, AAOx3, calm and cooperative  HEENT: NCAT, DAMARIS, EOMI, Trachea ML, Neck supple  Cardiac: RRR S1, S2, no Murmurs, rubs or gallops  Respiratory: CTAB, normal respiratory effort, breath sounds equal BL, no wheeze, rhonchi or crackles  Abdomen: Soft, non-distended, non-tender, +bowel sounds  Musculoskeletal: Strength 5/5 BL UE/LE, ROM intact, compartments soft  Neuro: Sensation grossly intact and equal throughout, CN II-XII intact, no focal deficits  Vascular: Pulses 2+ throughout, extremities well perfused  Skin: Warm/dry, normal color, no jaundice  Incision/wound: healing well, dressings in place, clean, dry and intact    LABS  --------------------------------------------------------------------------------------  Labs:  CAPILLARY BLOOD GLUCOSE      POCT Blood Glucose.: 183 mg/dL (27 Feb 2020 11:46)  POCT Blood Glucose.: 197 mg/dL (27 Feb 2020 07:54)  POCT Blood Glucose.: 165 mg/dL (27 Feb 2020 01:05)  POCT Blood Glucose.: 151 mg/dL (26 Feb 2020 21:02)  POCT Blood Glucose.: 144 mg/dL (26 Feb 2020 17:05)                          14.3   22.67 )-----------( 285      ( 27 Feb 2020 05:34 )             49.5         02-27    138  |  95<L>  |  21<H>  ----------------------------<  172<H>  4.2   |  21  |  0.7      Magnesium, Serum: 1.9 mg/dL (02-27-20 @ 05:34)      LFTs:             7.4  | 2.8  | 43       ------------------[264     ( 27 Feb 2020 05:34 )  3.5  | x    | 22          Lipase:18     Amylase:x             Coags:                  --------------------------------------------------------------------------------------    OTHER LABS    IMAGING RESULTS General Surgery Consultation Note  =====================================================  HPI: 64y Male PMH HTN, DLD, CHF and AICD placement 2/11 (repeat echo post procedure EF 25-30%, severe systolic dysfcn, mild mod TR, MR, and moderate pulmonary HTN), DM on insulin pump, hx diverticulitis, ischemic CM, diabetic neuropathy and DM on insulin pump, HLD, MI s/p stent 10/2018, celiac disease, and recent hospitalizations for R hip fracture now s/p hip ORIF 1/31/20, right knee replacement c/b infection and skin grafting, liner replacement 11/19 with PICC Abx course, and admissions with altered mental status. After his most recent fall and hip fracture with repair followed by AICD placement, he was sent to a facility where he was noted to have agitation and combativeness, with noted UTI and leukocytosis, diagnosed with metabolic encephalopathy, MOISES, UTI, electrolyte derangements HF, and rising troponins. He has been having cramping abdominal pain, most in the upper abdomen, that started yesterday afternoon with some RUQ pain that then became generalized overnight. He states he has been constipated for a few days, until he started passing bowel movements         CC: Altered mental status   PM/SH: HTN, DLD, CAD s/p CABG, HFrEF (25-30% on ECHO 2/2020, 20-25% on MUGA scan in 12/2019) s/p st carlyle BiV placement 2/2020, DM (on insulin pump), pulmonary hypertension, Celiac disease, diverticulitis, s/p right total knee replacement October 2019 complicated by infection s/p abx course w/ PICC and  right thigh with skin graft, right hip fracture s/p ORIF 2/2020     History of Present Illness goes back to the day of admission. The patient as send here from NH where he was for rehab after his recent ORIF. Per the records, patient was agitated and combative with staff. Patient was claiming "there was a person sneaking around by his room and he wanted the staff from NH to call the Police." Patient also stated he was scared so he was screaming.  Per the patient he does not know exactly what happened. He said he realized he was acting weird but was not aware of why. He said he got into confrontations with the nursing staff, the police and the EMS because they were being aggressive towards him.   Patient otherwise denies any hallucination/ HA/dizziness/chest pain/sob/abd pain/n/v/d.    In the ED, vitals were stable. Labs showed elevated WBC (it has been before). Na was 130 (became low after last admission), K was 5.2, creatinine was 1.9 from 0.8. Lactate was 2.4. UA was grossly positive. Per the ED team and signout and patient fields was changed and sample was from urine. Per their notes, the patient refused fields removal. (21 Feb 2020 04:11)      Surgery Information  OR time:      EBL:          IV Fluids:       Blood Products:   UOP:        PAST MEDICAL & SURGICAL HISTORY:  Diabetic neuropathy  STEMI (ST elevation myocardial infarction)  Diverticulitis  MRSA bacteremia  History of celiac disease  CHF (congestive heart failure): EF ~ 25%  HTN (hypertension)  Diabetes: on insulin pump  Blood clot due to device, implant, or graft: was on blood thinners  HLD (hyperlipidemia)  Osteoarthritis  Atherosclerosis of coronary artery: CAD (coronary artery disease)  Status post percutaneous transluminal coronary angioplasty: in 2012  History of open reduction and internal fixation (ORIF) procedure  Surgery, elective: Right shoulder  Surgery, elective: right knee wound debridement  S/P TKR (total knee replacement), right: with infection Mrsa   per pt he was cleared from MRSA infection  S/P CABG x 1: 2018  Stented coronary artery: 10/18 heart attack  INFERIOR WALL MI  Other postprocedural status: Fixation hardware in foot LEFT    Home Meds: Home Medications:  aspirin 81 mg oral delayed release tablet: 1 tab(s) orally once a day (31 Jan 2020 06:21)  atorvastatin 40 mg oral tablet: 1 tab(s) orally once a day (31 Jan 2020 06:21)  digoxin 125 mcg (0.125 mg) oral tablet: 1 tab(s) orally once a day (31 Jan 2020 06:21)  DULoxetine 30 mg oral delayed release capsule: 3 cap(s) orally once a day (31 Jan 2020 08:32)  furosemide 40 mg oral tablet: 1 tab(s) orally once a day (31 Jan 2020 06:21)  Jardiance:  (31 Jan 2020 08:33)  metOLazone 2.5 mg oral tablet: 1 tab(s) orally once a day, 30 min before lasix (31 Jan 2020 06:21)  Metoprolol Succinate ER 50 mg oral tablet, extended release: 3 tab(s) orally once a day (31 Jan 2020 06:21)  pantoprazole 40 mg oral delayed release tablet: 1 tab(s) orally once a day (31 Jan 2020 06:21)  prasugrel 10 mg oral tablet: 1 tab(s) orally once a day (31 Jan 2020 06:21)  tamsulosin 0.4 mg oral capsule: 1 cap(s) orally once a day (at bedtime) (13 Feb 2020 11:05)  Trulicity Pen: subcutaneous once a week (31 Jan 2020 08:33)  Zetia 10 mg oral tablet: 1 tab(s) orally once a day (at bedtime) (31 Jan 2020 06:21)    Allergies: Allergies    ACE inhibitors (Hives)  enalapril (Hives)    Intolerances      Soc:   Advanced Directives: Presumed Full Code     ROS:    REVIEW OF SYSTEMS    [ ] A ten-point review of systems was otherwise negative except as noted.  [ ] Due to altered mental status/intubation, subjective information were not able to be obtained from the patient. History was obtained, to the extent possible, from review of the chart and collateral sources of information.      CURRENT MEDICATIONS:   --------------------------------------------------------------------------------------  Neurologic Medications  acetaminophen   Tablet .. 650 milliGRAM(s) Oral every 6 hours PRN Temp greater or equal to 38C (100.4F), Moderate Pain (4 - 6)  DULoxetine 90 milliGRAM(s) Oral daily  ondansetron Injectable 4 milliGRAM(s) IV Push once PRN Nausea and/or Vomiting    Respiratory Medications    Cardiovascular Medications  digoxin     Tablet 0.125 milliGRAM(s) Oral daily  metoprolol succinate ER 50 milliGRAM(s) Oral daily  tamsulosin Oral Tab/Cap - Peds 0.4 milliGRAM(s) Oral at bedtime    Gastrointestinal Medications  aluminum hydroxide/magnesium hydroxide/simethicone Suspension 30 milliLiter(s) Oral every 6 hours PRN Dyspepsia  dextrose 5%. 1000 milliLiter(s) IV Continuous <Continuous>  pantoprazole    Tablet 40 milliGRAM(s) Oral at bedtime  sodium chloride 0.9%. 1000 milliLiter(s) IV Continuous <Continuous>    Genitourinary Medications    Hematologic/Oncologic Medications  aspirin enteric coated 81 milliGRAM(s) Oral daily  heparin  Injectable 5000 Unit(s) SubCutaneous every 8 hours  prasugrel 10 milliGRAM(s) Oral daily    Antimicrobial/Immunologic Medications  cefTRIAXone   IVPB 1000 milliGRAM(s) IV Intermittent every 24 hours  cefTRIAXone   IVPB      metroNIDAZOLE  IVPB      metroNIDAZOLE  IVPB 500 milliGRAM(s) IV Intermittent every 8 hours    Endocrine/Metabolic Medications  atorvastatin Oral Tab/Cap - Peds 40 milliGRAM(s) Oral daily  dextrose 40% Gel 15 Gram(s) Oral once PRN Blood Glucose LESS THAN 70 milliGRAM(s)/deciliter  dextrose 50% Injectable 12.5 Gram(s) IV Push once  dextrose 50% Injectable 25 Gram(s) IV Push once  dextrose 50% Injectable 25 Gram(s) IV Push once  glucagon  Injectable 1 milliGRAM(s) IntraMuscular once PRN Glucose LESS THAN 70 milligrams/deciliter  insulin glargine Injectable (LANTUS) 20 Unit(s) SubCutaneous at bedtime  insulin lispro (HumaLOG) corrective regimen sliding scale   SubCutaneous three times a day before meals  insulin lispro Injectable (HumaLOG) 6 Unit(s) SubCutaneous three times a day before meals    Topical/Other Medications  BACItracin   Ointment 1 Application(s) Topical two times a day  chlorhexidine 4% Liquid 1 Application(s) Topical <User Schedule>    --------------------------------------------------------------------------------------    VITAL SIGNS, INS/OUTS (last 24 hours):  --------------------------------------------------------------------------------------  ICU Vital Signs Last 24 Hrs  T(C): 36.2 (27 Feb 2020 04:40), Max: 36.7 (26 Feb 2020 21:25)  T(F): 97.2 (27 Feb 2020 04:40), Max: 98 (26 Feb 2020 21:25)  HR: 88 (27 Feb 2020 04:40) (85 - 88)  BP: 123/64 (27 Feb 2020 04:40) (123/64 - 133/64)  BP(mean): --  ABP: --  ABP(mean): --  RR: 18 (27 Feb 2020 04:40) (18 - 18)  SpO2: 96% (27 Feb 2020 07:43) (96% - 96%)    I&O's Summary    26 Feb 2020 07:01  -  27 Feb 2020 07:00  --------------------------------------------------------  IN: 0 mL / OUT: 1120 mL / NET: -1120 mL      --------------------------------------------------------------------------------------  PHYSICAL EXAM    General: NAD, AAOx3, calm and cooperative  HEENT: NCAT, DAMARIS, EOMI, Trachea ML, Neck supple  Cardiac: RRR S1, S2, no Murmurs, rubs or gallops  Respiratory: CTAB, normal respiratory effort, breath sounds equal BL, no wheeze, rhonchi or crackles  Abdomen: Soft, non-distended, non-tender, +bowel sounds  Musculoskeletal: Strength 5/5 BL UE/LE, ROM intact, compartments soft  Neuro: Sensation grossly intact and equal throughout, CN II-XII intact, no focal deficits  Vascular: Pulses 2+ throughout, extremities well perfused  Skin: Warm/dry, normal color, no jaundice  Incision/wound: healing well, dressings in place, clean, dry and intact    LABS  --------------------------------------------------------------------------------------  Labs:  CAPILLARY BLOOD GLUCOSE      POCT Blood Glucose.: 183 mg/dL (27 Feb 2020 11:46)  POCT Blood Glucose.: 197 mg/dL (27 Feb 2020 07:54)  POCT Blood Glucose.: 165 mg/dL (27 Feb 2020 01:05)  POCT Blood Glucose.: 151 mg/dL (26 Feb 2020 21:02)  POCT Blood Glucose.: 144 mg/dL (26 Feb 2020 17:05)                          14.3   22.67 )-----------( 285      ( 27 Feb 2020 05:34 )             49.5         02-27    138  |  95<L>  |  21<H>  ----------------------------<  172<H>  4.2   |  21  |  0.7      Magnesium, Serum: 1.9 mg/dL (02-27-20 @ 05:34)      LFTs:             7.4  | 2.8  | 43       ------------------[264     ( 27 Feb 2020 05:34 )  3.5  | x    | 22          Lipase:18     Amylase:x             Coags:                  --------------------------------------------------------------------------------------    OTHER LABS    IMAGING RESULTS General Surgery Consultation Note  =====================================================  HPI: 64y Male PMH HTN, DLD, CHF and AICD placement 2/11 (repeat echo post procedure EF 25-30%, severe systolic dysfcn, mild mod TR, MR, and moderate pulmonary HTN), DM on insulin pump, hx diverticulitis, ischemic CM, diabetic neuropathy and DM on insulin pump, HLD, MI s/p stent 10/2018, celiac disease, and recent hospitalizations for R hip fracture now s/p hip ORIF 1/31/20, right knee replacement c/b infection and skin grafting, liner replacement 11/19 with PICC Abx course, and admissions with altered mental status. After his most recent fall and hip fracture with repair followed by AICD placement, he was sent to a facility where he was noted to have agitation and combativeness, with noted UTI and leukocytosis, diagnosed with metabolic encephalopathy, MOISES, UTI, electrolyte derangements HF, and rising troponins. He has been having cramping abdominal pain, most in the upper abdomen, that started yesterday afternoon with some RUQ pain that then became generalized overnight. He states he has been constipated for a few days, until he started passing loose bowel movements  overnight. He states they are similar to when he has issues with his celiac disease when he has cramping but his stool is less malodorous this episode.  He has mild nausea, no emesis, and has no history of biliary colic. He is passing flatus, and has an appetite. He last had a normal diet yesterday, which he states did not assosciate with his pain.   He has an elevated T bili was 1.3's in January, but has since been 2s-2.5,  ALPs 200s since 2/14, AST 43, ALT 22.  His WBC went from 9-> 12-> 22.  He had a GB ultrasound with distended gallbladder with stones and sludge, no wall thickening or pericholecystic fluid, no Garcia's sign, CBD 6mm.         PAST MEDICAL & SURGICAL HISTORY:  Diabetic neuropathy  STEMI (ST elevation myocardial infarction)  Diverticulitis  MRSA bacteremia  History of celiac disease  CHF (congestive heart failure): EF ~ 25%  HTN (hypertension)  Diabetes: on insulin pump  Blood clot due to device, implant, or graft: was on blood thinners  HLD (hyperlipidemia)  Osteoarthritis  Atherosclerosis of coronary artery: CAD (coronary artery disease)  Status post percutaneous transluminal coronary angioplasty: in 2012  History of open reduction and internal fixation (ORIF) procedure  Surgery, elective: Right shoulder  Surgery, elective: right knee wound debridement  S/P TKR (total knee replacement), right: with infection Mrsa   per pt he was cleared from MRSA infection  S/P CABG x 1: 2018  Stented coronary artery: 10/18 heart attack  INFERIOR WALL MI  Other postprocedural status: Fixation hardware in foot LEFT    Home Meds:   aspirin 81 mg oral delayed release tablet: 1 tab(s) orally once a day (31 Jan 2020 06:21)  atorvastatin 40 mg oral tablet: 1 tab(s) orally once a day (31 Jan 2020 06:21)  digoxin 125 mcg (0.125 mg) oral tablet: 1 tab(s) orally once a day (31 Jan 2020 06:21)  DULoxetine 30 mg oral delayed release capsule: 3 cap(s) orally once a day (31 Jan 2020 08:32)  furosemide 40 mg oral tablet: 1 tab(s) orally once a day (31 Jan 2020 06:21)  Jardiance:  (31 Jan 2020 08:33)  metOLazone 2.5 mg oral tablet: 1 tab(s) orally once a day, 30 min before lasix (31 Jan 2020 06:21)  Metoprolol Succinate ER 50 mg oral tablet, extended release: 3 tab(s) orally once a day (31 Jan 2020 06:21)  pantoprazole 40 mg oral delayed release tablet: 1 tab(s) orally once a day (31 Jan 2020 06:21)  prasugrel 10 mg oral tablet: 1 tab(s) orally once a day (31 Jan 2020 06:21)  tamsulosin 0.4 mg oral capsule: 1 cap(s) orally once a day (at bedtime) (13 Feb 2020 11:05)  Trulicity Pen: subcutaneous once a week (31 Jan 2020 08:33)  Zetia 10 mg oral tablet: 1 tab(s) orally once a day (at bedtime) (31 Jan 2020 06:21)    Allergies: Allergies  ACE inhibitors (Hives)  enalapril (Hives)  rifampin   vanco  Intolerances      Soc:   Advanced Directives: Presumed Full Code     ROS:    REVIEW OF SYSTEMS    [x ] A ten-point review of systems was otherwise negative except as noted.  [ ] Due to altered mental status/intubation, subjective information were not able to be obtained from the patient. History was obtained, to the extent possible, from review of the chart and collateral sources of information.      CURRENT MEDICATIONS:   --------------------------------------------------------------------------------------  Neurologic Medications  acetaminophen   Tablet .. 650 milliGRAM(s) Oral every 6 hours PRN Temp greater or equal to 38C (100.4F), Moderate Pain (4 - 6)  DULoxetine 90 milliGRAM(s) Oral daily  ondansetron Injectable 4 milliGRAM(s) IV Push once PRN Nausea and/or Vomiting    Respiratory Medications    Cardiovascular Medications  digoxin     Tablet 0.125 milliGRAM(s) Oral daily  metoprolol succinate ER 50 milliGRAM(s) Oral daily  tamsulosin Oral Tab/Cap - Peds 0.4 milliGRAM(s) Oral at bedtime    Gastrointestinal Medications  aluminum hydroxide/magnesium hydroxide/simethicone Suspension 30 milliLiter(s) Oral every 6 hours PRN Dyspepsia  dextrose 5%. 1000 milliLiter(s) IV Continuous <Continuous>  pantoprazole    Tablet 40 milliGRAM(s) Oral at bedtime  sodium chloride 0.9%. 1000 milliLiter(s) IV Continuous <Continuous>    Genitourinary Medications    Hematologic/Oncologic Medications  aspirin enteric coated 81 milliGRAM(s) Oral daily  heparin  Injectable 5000 Unit(s) SubCutaneous every 8 hours  prasugrel 10 milliGRAM(s) Oral daily    Antimicrobial/Immunologic Medications  cefTRIAXone   IVPB 1000 milliGRAM(s) IV Intermittent every 24 hours  cefTRIAXone   IVPB      metroNIDAZOLE  IVPB      metroNIDAZOLE  IVPB 500 milliGRAM(s) IV Intermittent every 8 hours    Endocrine/Metabolic Medications  atorvastatin Oral Tab/Cap - Peds 40 milliGRAM(s) Oral daily  dextrose 40% Gel 15 Gram(s) Oral once PRN Blood Glucose LESS THAN 70 milliGRAM(s)/deciliter  dextrose 50% Injectable 12.5 Gram(s) IV Push once  dextrose 50% Injectable 25 Gram(s) IV Push once  dextrose 50% Injectable 25 Gram(s) IV Push once  glucagon  Injectable 1 milliGRAM(s) IntraMuscular once PRN Glucose LESS THAN 70 milligrams/deciliter  insulin glargine Injectable (LANTUS) 20 Unit(s) SubCutaneous at bedtime  insulin lispro (HumaLOG) corrective regimen sliding scale   SubCutaneous three times a day before meals  insulin lispro Injectable (HumaLOG) 6 Unit(s) SubCutaneous three times a day before meals    Topical/Other Medications  BACItracin   Ointment 1 Application(s) Topical two times a day  chlorhexidine 4% Liquid 1 Application(s) Topical <User Schedule>    --------------------------------------------------------------------------------------    VITAL SIGNS, INS/OUTS (last 24 hours):  --------------------------------------------------------------------------------------  ICU Vital Signs Last 24 Hrs  T(C): 36.2 (27 Feb 2020 04:40), Max: 36.7 (26 Feb 2020 21:25)  T(F): 97.2 (27 Feb 2020 04:40), Max: 98 (26 Feb 2020 21:25)  HR: 88 (27 Feb 2020 04:40) (85 - 88)  BP: 123/64 (27 Feb 2020 04:40) (123/64 - 133/64)  BP(mean): --  ABP: --  ABP(mean): --  RR: 18 (27 Feb 2020 04:40) (18 - 18)  SpO2: 96% (27 Feb 2020 07:43) (96% - 96%)    I&O's Summary    26 Feb 2020 07:01  -  27 Feb 2020 07:00  --------------------------------------------------------  IN: 0 mL / OUT: 1120 mL / NET: -1120 mL      --------------------------------------------------------------------------------------  PHYSICAL EXAM    General: NAD, AAOx3, calm. soft voice   HEENT: NCAT, DAMARIS, EOMI, Trachea ML, Neck supple  Cardiac: regular, + systolic murmur, AICD incision without induration or pain, no erythema, left upper chest  Respiratory: on room air, CTAB, normal respiratory effort, breath sounds equal BL, no wheeze, rhonchi or crackles  Abdomen: Soft, nondistended, + tenderness in the RUQ as well as generalized mild tenderness. +bowel sounds  : fields in place   Musculoskeletal:  compartments soft. right knee bandages right hip incisions healing well, no tenderness on motion   Neuro: Sensation grossly intact and equal throughout, no focal deficits  Vascular:  extremities well perfused  Skin: Warm/dry, normal color, no jaundice. Incisions as above      LABS  --------------------------------------------------------------------------------------  Labs:  CAPILLARY BLOOD GLUCOSE  POCT Blood Glucose.: 183 mg/dL (27 Feb 2020 11:46)  POCT Blood Glucose.: 197 mg/dL (27 Feb 2020 07:54)  POCT Blood Glucose.: 165 mg/dL (27 Feb 2020 01:05)  POCT Blood Glucose.: 151 mg/dL (26 Feb 2020 21:02)  POCT Blood Glucose.: 144 mg/dL (26 Feb 2020 17:05)                          14.3   22.67 )-----------( 285      ( 27 Feb 2020 05:34 )             49.5         02-27    138  |  95<L>  |  21<H>  ----------------------------<  172<H>  4.2   |  21  |  0.7      Magnesium, Serum: 1.9 mg/dL (02-27-20 @ 05:34)      LFTs:             7.4  | 2.8  | 43       ------------------[264     ( 27 Feb 2020 05:34 )  3.5  | x    | 22          Lipase:18     Amylase:x          Coags:  --------------------------------------------------------------------------------------    OTHER LABS    IMAGING RESULTS  < from: US Abdomen Limited (02.26.20 @ 14:45) >  GALLBLADDER/BILIARY TREE:  Hydropic gallbladder filled with stones and sludge. No wall thickening or pericholecysticfluid. Negative sonographic Garcia's sign. No intrahepatic biliary ductal dilatation. The common bile duct measures 6 mm, which is normal.   PANCREAS: Largely obscured  KIDNEY: Right kidney partially visualized and an interpolar to be evaluated.   ASCITES:  None.    IMPRESSION:  Limited study due to patient condition.  Hydropic gallbladder filled with stones and sludge. No definite findings of acute cholecystitis.   No biliary ductal dilatation.    < from: CT Abdomen and Pelvis w/ IV Cont (02.27.20 @ 10:28) >    LOWER CHEST: Partially imaged pacemaker leads. New trace right pleural effusion. There are lung field atelectasis.  HEPATOBILIARY: Hydropic gallbladder measuring approximately 11.6 x 4.9 cm with surrounding fat stranding. Small amount of cholelithiasis. New trace perihepatic ascites.   SPLEEN: Unremarkable..  PANCREAS: Unremarkable..  ADRENAL GLANDS: Unremarkable..  KIDNEYS: Lack of enhancement of the left lower pole, new since prior examination with some atrophy consistent with ischemic/infarcted change of indeterminate acuity. There is stable fullness to the proximal left collecting system. There is no right hydronephrosis. There are nonobstructing left renal calculi measuring up to 3 mm. There are bilateral subcentimeter hypodensities, too small to characterize..  ABDOMINOPELVIC NODES: Unremarkable..  PELVIC ORGANS: Mildly distended urinary bladder despite containing a urinary catheter. Intraluminal bladder gas likely secondary to instrumentation. Seminal vesicle calcifications.  PERITONEUM/MESENTERY/BOWEL: Nonspecific mildly distended loops of small bowel in the left abdomen. No evidence of obstruction. Mild volume ascites. No intra-abdominal free air. Appendix is not well visualized.  BONES/SOFT TISSUES: Partially imaged ribs show bilateral lower rib deformities. Degenerative changes of the spine. Interval placement of right femoral fixation hardware, hardware intact. Bones are osteopenic.  OTHER: Vascular calcifications.      IMPRESSION:    Hydropic gallbladder measuring approximately 11.6 x 4.9 cm with surrounding fat stranding and small amount of cholelithiasis. Correlation with right upper quadrant ultrasound from same day is recommended.    Indeterminateage ischemia/infarct to the lower pole of the left kidney. Further evaluation is recommended.      < end of copied text >
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
## 2020-02-27 NOTE — CONSULT NOTE ADULT - ASSESSMENT
ASSESSMENT:  64y Male ***    PLAN:       --------------------------------------------------------------------------------------    02-27-20 @ 13:1 ASSESSMENT:  64y Male with extensive PMH including CHF s/p AICD 2/11/2020, right knee replacement infection, recent R hip ORIF, with abdominal pain and distended gallbladder with surrounding fat stranding and cholelithiasis     PLAN:   - obtain differentiated bili- direct and indirect bilirubin   - repeat ultrasound  Multiple medical issues, recent AICD placement,   Will discuss with Dr. Garibay     --------------------------------------------------------------------------------------    02-27-20 @ 13:1 ASSESSMENT:  64y Male with extensive PMH including CHF s/p AICD 2/11/2020, right knee replacement infection, recent R hip ORIF, with abdominal pain and distended gallbladder with surrounding fat stranding and cholelithiasis     PLAN:   - obtain differentiated bili- direct and indirect bilirubin, obtain Coags, trend labs   Multiple medical issues, recent AICD placement, on Effient, CHF. Recommend IR consultation for percutaneous cholecystostomy tube placement  Surgery will follow   Discussed with Dr. Garibay     --------------------------------------------------------------------------------------  02-27-20 @ 13:1 ASSESSMENT:  64y Male with extensive PMH including CHF s/p AICD 2/11/2020, right knee replacement infection, recent R hip ORIF, with abdominal pain and distended gallbladder with surrounding fat stranding and cholelithiasis     PLAN:   - obtain differentiated bili- direct and indirect bilirubin, obtain Coags, trend hepatic function panel labs  Multiple medical issues, recent AICD placement, on Effient, CHF. Recommend IR consultation for percutaneous cholecystostomy tube placement  Surgery will follow   Discussed with Dr. Garibay     --------------------------------------------------------------------------------------  02-27-20 @ 13:1

## 2020-02-27 NOTE — PROGRESS NOTE ADULT - SUBJECTIVE AND OBJECTIVE BOX
HPI:  Currently admitted to medicine with the primary diagnosis of Metabolic encephalopathy     Today is hospital day 5d.     INTERVAL HPI / OVERNIGHT EVENTS:  Patient was examined and seen at bedside. This morning he is resting in bed still complaining of right upper quadrant pain. A sonogram shows gallbladder stones and sludge. Patient has waxing and waning mentation, but conversational this AM. Denied cp or sob.    ROS: Otherwise unremarkable     PAST MEDICAL & SURGICAL HISTORY  Diabetic neuropathy  STEMI (ST elevation myocardial infarction)  Diverticulitis  MRSA bacteremia  History of celiac disease  CHF (congestive heart failure): EF ~ 25%  HTN (hypertension)  Diabetes: on insulin pump  Blood clot due to device, implant, or graft: was on blood thinners  HLD (hyperlipidemia)  Osteoarthritis  Atherosclerosis of coronary artery: CAD (coronary artery disease)  Status post percutaneous transluminal coronary angioplasty: in 2012  History of open reduction and internal fixation (ORIF) procedure  Surgery, elective: Right shoulder  Surgery, elective: right knee wound debridement  S/P TKR (total knee replacement), right: with infection Mrsa   per pt he was cleared from MRSA infection  S/P CABG x 1: 2018  Stented coronary artery: 10/18 heart attack  INFERIOR WALL MI  Other postprocedural status: Fixation hardware in foot LEFT    ALLERGIES  ACE inhibitors (Hives)  enalapril (Hives)    MEDICATIONS  STANDING MEDICATIONS  aspirin enteric coated 81 milliGRAM(s) Oral daily  atorvastatin Oral Tab/Cap - Peds 40 milliGRAM(s) Oral daily  BACItracin   Ointment 1 Application(s) Topical two times a day  dextrose 5%. 1000 milliLiter(s) IV Continuous <Continuous>  dextrose 50% Injectable 12.5 Gram(s) IV Push once  dextrose 50% Injectable 25 Gram(s) IV Push once  dextrose 50% Injectable 25 Gram(s) IV Push once  digoxin     Tablet 0.125 milliGRAM(s) Oral daily  DULoxetine 90 milliGRAM(s) Oral daily  heparin  Injectable 5000 Unit(s) SubCutaneous every 8 hours  insulin glargine Injectable (LANTUS) 20 Unit(s) SubCutaneous at bedtime  insulin lispro (HumaLOG) corrective regimen sliding scale   SubCutaneous three times a day before meals  insulin lispro Injectable (HumaLOG) 6 Unit(s) SubCutaneous three times a day before meals  metoprolol succinate  milliGRAM(s) Oral daily  pantoprazole    Tablet 40 milliGRAM(s) Oral before breakfast  prasugrel 10 milliGRAM(s) Oral daily  tamsulosin Oral Tab/Cap - Peds 0.4 milliGRAM(s) Oral at bedtime    PRN MEDICATIONS  acetaminophen   Tablet .. 650 milliGRAM(s) Oral every 6 hours PRN  dextrose 40% Gel 15 Gram(s) Oral once PRN  glucagon  Injectable 1 milliGRAM(s) IntraMuscular once PRN    VITALS:  T(F): 98.3  HR: 88  BP: 112/63  RR: 18  SpO2: 98%    PHYSICAL EXAM  GEN: NAD, Resting comfortably in bed  PULM: Clear to auscultation bilaterally, No wheezes  CVS: Regular rate and rhythm, S1-S2  ABD: Soft, non-distended, some tenderness in right upper quadrant. Garcia is weakly positive. No peritoneal signs. No guarding.   EXT: No edema  NEURO: AAOx3, slow mentation still    LABS                        13.0   8.38  )-----------( 292      ( 24 Feb 2020 05:56 )             43.3     02-24    137  |  94<L>  |  26<H>  ----------------------------<  179<H>  3.5   |  27  |  0.8    Ca    9.4      24 Feb 2020 05:56  Mg     2.4     02-23    TPro  6.8  /  Alb  3.5  /  TBili  1.8<H>  /  DBili  x   /  AST  30  /  ALT  20  /  AlkPhos  238<H>  02-23      RADIOLOGY    < from: US Kidney and Bladder (02.21.20 @ 09:44) >  IMPRESSION:  Mild left hydronephrosis without any obvious stones. Consider a CT for further evaluation.    < end of copied text >    < from: CT Head No Cont (02.21.20 @ 02:26) >  IMPRESSION:     1.  No acute intra-cranial pathology.    2.  Moderate chronic microvascular ischemic changes.     < end of copied text >    < from: Xray Chest 1 View- PORTABLE-Urgent (02.21.20 @ 01:16) >  Impression:      No radiographic evidence of acute cardiopulmonary disease.    < end of copied text >      < from: EEG (02.24.20 @ 12:00) >  Impression  -  Abnormal due to the presence of: generalized slowing as above    < end of copied text > HPI:  Currently admitted to medicine with the primary diagnosis of Metabolic encephalopathy     Today is hospital day 6d.     INTERVAL HPI / OVERNIGHT EVENTS:  Patient was examined and seen at bedside. This morning he is resting in bed still complaining of right upper quadrant pain. A sonogram shows gallbladder stones and sludge. Patient has waxing and waning mentation, but conversational this AM. Denied cp or sob.    ROS: Otherwise unremarkable     PAST MEDICAL & SURGICAL HISTORY  Diabetic neuropathy  STEMI (ST elevation myocardial infarction)  Diverticulitis  MRSA bacteremia  History of celiac disease  CHF (congestive heart failure): EF ~ 25%  HTN (hypertension)  Diabetes: on insulin pump  Blood clot due to device, implant, or graft: was on blood thinners  HLD (hyperlipidemia)  Osteoarthritis  Atherosclerosis of coronary artery: CAD (coronary artery disease)  Status post percutaneous transluminal coronary angioplasty: in 2012  History of open reduction and internal fixation (ORIF) procedure  Surgery, elective: Right shoulder  Surgery, elective: right knee wound debridement  S/P TKR (total knee replacement), right: with infection Mrsa   per pt he was cleared from MRSA infection  S/P CABG x 1: 2018  Stented coronary artery: 10/18 heart attack  INFERIOR WALL MI  Other postprocedural status: Fixation hardware in foot LEFT    ALLERGIES  ACE inhibitors (Hives)  enalapril (Hives)    MEDICATIONS  STANDING MEDICATIONS  aspirin enteric coated 81 milliGRAM(s) Oral daily  atorvastatin Oral Tab/Cap - Peds 40 milliGRAM(s) Oral daily  BACItracin   Ointment 1 Application(s) Topical two times a day  dextrose 5%. 1000 milliLiter(s) IV Continuous <Continuous>  dextrose 50% Injectable 12.5 Gram(s) IV Push once  dextrose 50% Injectable 25 Gram(s) IV Push once  dextrose 50% Injectable 25 Gram(s) IV Push once  digoxin     Tablet 0.125 milliGRAM(s) Oral daily  DULoxetine 90 milliGRAM(s) Oral daily  heparin  Injectable 5000 Unit(s) SubCutaneous every 8 hours  insulin glargine Injectable (LANTUS) 20 Unit(s) SubCutaneous at bedtime  insulin lispro (HumaLOG) corrective regimen sliding scale   SubCutaneous three times a day before meals  insulin lispro Injectable (HumaLOG) 6 Unit(s) SubCutaneous three times a day before meals  metoprolol succinate  milliGRAM(s) Oral daily  pantoprazole    Tablet 40 milliGRAM(s) Oral before breakfast  prasugrel 10 milliGRAM(s) Oral daily  tamsulosin Oral Tab/Cap - Peds 0.4 milliGRAM(s) Oral at bedtime    PRN MEDICATIONS  acetaminophen   Tablet .. 650 milliGRAM(s) Oral every 6 hours PRN  dextrose 40% Gel 15 Gram(s) Oral once PRN  glucagon  Injectable 1 milliGRAM(s) IntraMuscular once PRN    VITALS:  T(F): 98.3  HR: 88  BP: 112/63  RR: 18  SpO2: 98%    PHYSICAL EXAM  GEN: NAD, Resting comfortably in bed  PULM: Clear to auscultation bilaterally, No wheezes  CVS: Regular rate and rhythm, S1-S2  ABD: Soft, non-distended, some tenderness in right upper quadrant. Garcia is weakly positive. No peritoneal signs. No guarding.   EXT: No edema  NEURO: AAOx3, slow mentation still    LABS                        13.0   8.38  )-----------( 292      ( 24 Feb 2020 05:56 )             43.3     02-24    137  |  94<L>  |  26<H>  ----------------------------<  179<H>  3.5   |  27  |  0.8    Ca    9.4      24 Feb 2020 05:56  Mg     2.4     02-23    TPro  6.8  /  Alb  3.5  /  TBili  1.8<H>  /  DBili  x   /  AST  30  /  ALT  20  /  AlkPhos  238<H>  02-23      RADIOLOGY    < from: US Kidney and Bladder (02.21.20 @ 09:44) >  IMPRESSION:  Mild left hydronephrosis without any obvious stones. Consider a CT for further evaluation.    < end of copied text >    < from: CT Head No Cont (02.21.20 @ 02:26) >  IMPRESSION:     1.  No acute intra-cranial pathology.    2.  Moderate chronic microvascular ischemic changes.     < end of copied text >    < from: Xray Chest 1 View- PORTABLE-Urgent (02.21.20 @ 01:16) >  Impression:      No radiographic evidence of acute cardiopulmonary disease.    < end of copied text >      < from: EEG (02.24.20 @ 12:00) >  Impression  -  Abnormal due to the presence of: generalized slowing as above    < end of copied text >

## 2020-02-27 NOTE — CHART NOTE - NSCHARTNOTEFT_GEN_A_CORE
Registered Dietitian Follow-Up     Patient Profile Reviewed                           Yes [x]   No []     Nutrition History Previously Obtained        Yes [x]  No []      Pertinent Medical Interventions: Abdominal US shows gallbladder stones and sludge. Follow up CT A/P; f/u GI.    Diet order: NPO since yesterday, prior to that pt states he was still consuming about half of his meal trays. Able to conduct nutrition focused physical exam at time of RD visit today; pt displays moderate muscle loss to the temporal/clavicle regions.      Anthropometrics:  - Ht. 73"  - Wt. 80.7kg (2/25) vs adm wt of 95.5kg--?bed-scale inaccuracy vs. wt loss--will monitor wt trends closely   - %wt change  - BMI: 27.8  - IBW: 184#      Pertinent Lab Data: (2/27): BUN 21, Gluc 172, POCT 183, elevated LFTs      Pertinent Meds: aspirin, heparin, abx, NS, maalox, insulin, lipitor, digoxin, metoprolol, protonix, tamsulosin, zofran      Physical Findings:  - Appearance: confused at times; no edema noted   - GI function: LBM 2/25   - Oral/Mouth cavity: denies difficulty swallowing/chewing  - Skin: stage 2 pressure injury to sacral spine     Nutrition Requirements  Weight Used: will adjust needs today using lowest wt: 81kg     Estimated Energy Needs:         Estimated Protein Needs    Continue []  Adjust []  Adjusted Protein Recommendations:   gm/day        Estimated Fluid Needs        Continue []  Adjust []  Adjusted Fluid Recommendations:   mL/day     Nutrient Intake:        [] Previous Nutrition Diagnosis:            [] Ongoing          [] Resolved    [] No active nutrition diagnosis identified at this time     Nutrition Diagnostic #1  Problem:  Etiology:  Statement:     Nutrition Diagnostic #2  Problem:  Etiology:  Statement:     Nutrition Intervention      Goal/Expected Outcome:     Indicator/Monitoring: Registered Dietitian Follow-Up     Patient Profile Reviewed                           Yes [x]   No []     Nutrition History Previously Obtained        Yes [x]  No []      Pertinent Medical Interventions: Abdominal US shows gallbladder stones and sludge. Follow up CT A/P; f/u GI.    Diet order: NPO since yesterday, prior to that pt states he was still consuming about half of his meal trays. Able to conduct nutrition focused physical exam at time of RD visit today; pt displays moderate muscle loss to the temporal/clavicle regions.      Anthropometrics:  - Ht. 73"  - Wt. 80.7kg (2/25) vs adm wt of 95.5kg--?bed-scale inaccuracy vs. wt loss--will monitor wt trends closely   - %wt change  - BMI: 27.8  - IBW: 184#      Pertinent Lab Data: (2/27): BUN 21, Gluc 172, POCT 183, elevated LFTs      Pertinent Meds: aspirin, heparin, abx, NS, maalox, insulin, lipitor, digoxin, metoprolol, protonix, tamsulosin, zofran      Physical Findings:  - Appearance: confused at times; no edema noted   - GI function: LBM 2/25   - Oral/Mouth cavity: denies difficulty swallowing/chewing  - Skin: stage 2 pressure injury to sacral spine     Nutrition Requirements  Weight Used: will adjust needs today using lowest wt: 81kg     Estimated Energy Needs: 8662-2059 gm/day (MSJ x 1.2-1.5 AF)--increased needs d/t PCM and wound healing   Estimated Protein Needs:  gm/day (1.2-1.5 gm/kg CBW)--same as mentioned above   Estimated Fluid Needs: 1 ml/kcal     Nutrient Intake: not meeting kcal/pro needs at this time      Previous Nutrition Diagnosis: Inadequate Oral Intake (ongoing)      Nutrition Diagnostic #1  Problem: Moderate Protein-Calorie Malnutrition    Etiology: in the context of acute illness/injury   Statement: moderate muscle loss to the temporal/clavicle regions and <75% of energy needs met for >7 days      Nutrition Intervention: meals and snacks, medical food supplements    Recommendations:  1. When medically feasible to resume diet, please activate pending diet order from 2/24     Goal/Expected Outcome: Pt to consume >75% po intake of meals, snacks, and supplements within 4 days     Indicator/Monitoring: RD to monitor diet order, energy intake, body composition, glucose/liver profile, nutrition focused physical findings

## 2020-02-27 NOTE — PROGRESS NOTE ADULT - SUBJECTIVE AND OBJECTIVE BOX
LOIDA FLOWER  64y  Male      Patient is a 64y old  Male who presents with a chief complaint of Altered mental status (27 Feb 2020 13:17)      INTERVAL HPI/OVERNIGHT EVENTS: c/o RUQ abdominal pain, dry mouth      REVIEW OF SYSTEMS:  as above  All other review of systems negative    T(C): 35.6 (02-27-20 @ 13:32), Max: 36.7 (02-26-20 @ 21:25)  HR: 92 (02-27-20 @ 13:32) (85 - 92)  BP: 124/56 (02-27-20 @ 13:32) (123/64 - 133/64)  RR: 18 (02-27-20 @ 13:32) (18 - 18)  SpO2: 96% (02-27-20 @ 07:43) (96% - 96%)  Wt(kg): --Vital Signs Last 24 Hrs  T(C): 35.6 (27 Feb 2020 13:32), Max: 36.7 (26 Feb 2020 21:25)  T(F): 96 (27 Feb 2020 13:32), Max: 98 (26 Feb 2020 21:25)  HR: 92 (27 Feb 2020 13:32) (85 - 92)  BP: 124/56 (27 Feb 2020 13:32) (123/64 - 133/64)  BP(mean): --  RR: 18 (27 Feb 2020 13:32) (18 - 18)  SpO2: 96% (27 Feb 2020 07:43) (96% - 96%)      02-26-20 @ 07:01  -  02-27-20 @ 07:00  --------------------------------------------------------  IN: 0 mL / OUT: 1120 mL / NET: -1120 mL    02-27-20 @ 07:01  -  02-27-20 @ 15:26  --------------------------------------------------------  IN: 0 mL / OUT: 300 mL / NET: -300 mL        PHYSICAL EXAM:  GENERAL: frail, temporal wasting  PSYCH: no agitation, baseline mentation  NERVOUS SYSTEM:  Alert & Oriented X3, no new focal deficits  PULMONARY: Clear to percussion bilaterally; No rales, rhonchi, wheezing, or rubs  CARDIOVASCULAR: Regular rate and rhythm; No murmurs, rubs, or gallops, L sided AICD  GI: +murphys sign, no rebound/guarding, thin   fields no sediment  EXTREMITIES:  2+ Peripheral Pulses, No clubbing, cyanosis, or edema    Consultant(s) Notes Reviewed:  [x ] YES  [ ] NO    Discussed with Consultants/Other Providers [ x] YES     LABS                          14.3   22.67 )-----------( 285      ( 27 Feb 2020 05:34 )             49.5     02-27    138  |  95<L>  |  21<H>  ----------------------------<  172<H>  4.2   |  21  |  0.7    Ca    9.6      27 Feb 2020 05:34  Mg     1.9     02-27    TPro  7.4  /  Alb  3.5  /  TBili  2.8<H>  /  DBili  x   /  AST  43<H>  /  ALT  22  /  AlkPhos  264<H>  02-27          Lactate Trend        CAPILLARY BLOOD GLUCOSE      POCT Blood Glucose.: 183 mg/dL (27 Feb 2020 11:46)        RADIOLOGY & ADDITIONAL TESTS:    Imaging Personally Reviewed:  [ ] YES  [ ] NO    HEALTH ISSUES - PROBLEM Dx:  Encephalopathy, metabolic  Delirium due to medical condition without behavioral disturbance  Delirium          #Progress Note Handoff    Pending (specify):  Consults_________, Tests________, Test Results_______, Other_________    Family discussion:    Disposition: Home___/SNF___/Other________/Unknown at this time________

## 2020-02-28 LAB
ANION GAP SERPL CALC-SCNC: 19 MMOL/L — HIGH (ref 7–14)
APTT BLD: 27.3 SEC — SIGNIFICANT CHANGE UP (ref 27–39.2)
APTT BLD: 31.4 SEC — SIGNIFICANT CHANGE UP (ref 27–39.2)
B-OH-BUTYR SERPL-SCNC: 0.5 MMOL/L — HIGH
BASOPHILS # BLD AUTO: 0.05 K/UL — SIGNIFICANT CHANGE UP (ref 0–0.2)
BASOPHILS NFR BLD AUTO: 0.2 % — SIGNIFICANT CHANGE UP (ref 0–1)
BLD GP AB SCN SERPL QL: SIGNIFICANT CHANGE UP
BUN SERPL-MCNC: 28 MG/DL — HIGH (ref 10–20)
CALCIUM SERPL-MCNC: 9.2 MG/DL — SIGNIFICANT CHANGE UP (ref 8.5–10.1)
CHLORIDE SERPL-SCNC: 95 MMOL/L — LOW (ref 98–110)
CO2 SERPL-SCNC: 23 MMOL/L — SIGNIFICANT CHANGE UP (ref 17–32)
CREAT SERPL-MCNC: 0.8 MG/DL — SIGNIFICANT CHANGE UP (ref 0.7–1.5)
DIGITOXIN SERPL-MCNC: SIGNIFICANT CHANGE UP NG/ML
EOSINOPHIL # BLD AUTO: 0 K/UL — SIGNIFICANT CHANGE UP (ref 0–0.7)
EOSINOPHIL NFR BLD AUTO: 0 % — SIGNIFICANT CHANGE UP (ref 0–8)
GAS PNL BLDA: SIGNIFICANT CHANGE UP
GLUCOSE BLDC GLUCOMTR-MCNC: 179 MG/DL — HIGH (ref 70–99)
GLUCOSE BLDC GLUCOMTR-MCNC: 188 MG/DL — HIGH (ref 70–99)
GLUCOSE BLDC GLUCOMTR-MCNC: 192 MG/DL — HIGH (ref 70–99)
GLUCOSE BLDC GLUCOMTR-MCNC: 195 MG/DL — HIGH (ref 70–99)
GLUCOSE SERPL-MCNC: 193 MG/DL — HIGH (ref 70–99)
HCT VFR BLD CALC: 41.5 % — LOW (ref 42–52)
HGB BLD-MCNC: 12.9 G/DL — LOW (ref 14–18)
IMM GRANULOCYTES NFR BLD AUTO: 1.8 % — HIGH (ref 0.1–0.3)
INR BLD: 1.5 RATIO — HIGH (ref 0.65–1.3)
LYMPHOCYTES # BLD AUTO: 0.49 K/UL — LOW (ref 1.2–3.4)
LYMPHOCYTES # BLD AUTO: 2 % — LOW (ref 20.5–51.1)
MAGNESIUM SERPL-MCNC: 2.2 MG/DL — SIGNIFICANT CHANGE UP (ref 1.8–2.4)
MCHC RBC-ENTMCNC: 23 PG — LOW (ref 27–31)
MCHC RBC-ENTMCNC: 31.1 G/DL — LOW (ref 32–37)
MCV RBC AUTO: 74 FL — LOW (ref 80–94)
MONOCYTES # BLD AUTO: 2.36 K/UL — HIGH (ref 0.1–0.6)
MONOCYTES NFR BLD AUTO: 9.6 % — HIGH (ref 1.7–9.3)
NEUTROPHILS # BLD AUTO: 21.24 K/UL — HIGH (ref 1.4–6.5)
NEUTROPHILS NFR BLD AUTO: 86.4 % — HIGH (ref 42.2–75.2)
NRBC # BLD: 0 /100 WBCS — SIGNIFICANT CHANGE UP (ref 0–0)
PLATELET # BLD AUTO: 264 K/UL — SIGNIFICANT CHANGE UP (ref 130–400)
POTASSIUM SERPL-MCNC: 4 MMOL/L — SIGNIFICANT CHANGE UP (ref 3.5–5)
POTASSIUM SERPL-SCNC: 4 MMOL/L — SIGNIFICANT CHANGE UP (ref 3.5–5)
PROTHROM AB SERPL-ACNC: 17.3 SEC — HIGH (ref 9.95–12.87)
RBC # BLD: 5.61 M/UL — SIGNIFICANT CHANGE UP (ref 4.7–6.1)
RBC # FLD: 25.3 % — HIGH (ref 11.5–14.5)
SODIUM SERPL-SCNC: 137 MMOL/L — SIGNIFICANT CHANGE UP (ref 135–146)
WBC # BLD: 24.58 K/UL — HIGH (ref 4.8–10.8)
WBC # FLD AUTO: 24.58 K/UL — HIGH (ref 4.8–10.8)

## 2020-02-28 PROCEDURE — 99152 MOD SED SAME PHYS/QHP 5/>YRS: CPT

## 2020-02-28 PROCEDURE — 99233 SBSQ HOSP IP/OBS HIGH 50: CPT

## 2020-02-28 PROCEDURE — 99024 POSTOP FOLLOW-UP VISIT: CPT

## 2020-02-28 PROCEDURE — 47490 INCISION OF GALLBLADDER: CPT

## 2020-02-28 RX ORDER — HEPARIN SODIUM 5000 [USP'U]/ML
5000 INJECTION INTRAVENOUS; SUBCUTANEOUS EVERY 8 HOURS
Refills: 0 | Status: DISCONTINUED | OUTPATIENT
Start: 2020-02-28 | End: 2020-03-19

## 2020-02-28 RX ORDER — PRASUGREL 5 MG/1
10 TABLET, FILM COATED ORAL DAILY
Refills: 0 | Status: DISCONTINUED | OUTPATIENT
Start: 2020-02-29 | End: 2020-03-11

## 2020-02-28 RX ORDER — MEROPENEM 1 G/30ML
1000 INJECTION INTRAVENOUS EVERY 8 HOURS
Refills: 0 | Status: DISCONTINUED | OUTPATIENT
Start: 2020-02-28 | End: 2020-03-03

## 2020-02-28 RX ADMIN — Medication 100 MILLIGRAM(S): at 05:58

## 2020-02-28 RX ADMIN — MEROPENEM 100 MILLIGRAM(S): 1 INJECTION INTRAVENOUS at 09:41

## 2020-02-28 RX ADMIN — MEROPENEM 100 MILLIGRAM(S): 1 INJECTION INTRAVENOUS at 21:35

## 2020-02-28 RX ADMIN — Medication 100 MILLIGRAM(S): at 16:03

## 2020-02-28 RX ADMIN — TAMSULOSIN HYDROCHLORIDE 0.4 MILLIGRAM(S): 0.4 CAPSULE ORAL at 21:34

## 2020-02-28 RX ADMIN — Medication 100 MILLIGRAM(S): at 21:35

## 2020-02-28 RX ADMIN — Medication 1 APPLICATION(S): at 05:58

## 2020-02-28 RX ADMIN — ATORVASTATIN CALCIUM 40 MILLIGRAM(S): 80 TABLET, FILM COATED ORAL at 11:22

## 2020-02-28 RX ADMIN — HEPARIN SODIUM 5000 UNIT(S): 5000 INJECTION INTRAVENOUS; SUBCUTANEOUS at 21:35

## 2020-02-28 RX ADMIN — Medication 81 MILLIGRAM(S): at 17:27

## 2020-02-28 RX ADMIN — MEROPENEM 100 MILLIGRAM(S): 1 INJECTION INTRAVENOUS at 16:03

## 2020-02-28 RX ADMIN — PANTOPRAZOLE SODIUM 40 MILLIGRAM(S): 20 TABLET, DELAYED RELEASE ORAL at 21:35

## 2020-02-28 RX ADMIN — CHLORHEXIDINE GLUCONATE 1 APPLICATION(S): 213 SOLUTION TOPICAL at 05:57

## 2020-02-28 RX ADMIN — HEPARIN SODIUM 5000 UNIT(S): 5000 INJECTION INTRAVENOUS; SUBCUTANEOUS at 05:58

## 2020-02-28 RX ADMIN — Medication 1 APPLICATION(S): at 17:25

## 2020-02-28 RX ADMIN — Medication 50 MILLIGRAM(S): at 05:59

## 2020-02-28 RX ADMIN — DULOXETINE HYDROCHLORIDE 90 MILLIGRAM(S): 30 CAPSULE, DELAYED RELEASE ORAL at 11:22

## 2020-02-28 RX ADMIN — SODIUM CHLORIDE 75 MILLILITER(S): 9 INJECTION INTRAMUSCULAR; INTRAVENOUS; SUBCUTANEOUS at 21:32

## 2020-02-28 RX ADMIN — Medication 0.12 MILLIGRAM(S): at 05:57

## 2020-02-28 NOTE — PROGRESS NOTE ADULT - SUBJECTIVE AND OBJECTIVE BOX
LOIDA FLOWER  64y  Male      Patient is a 64y old  Male who presents with a chief complaint of Altered mental status (28 Feb 2020 15:27)      INTERVAL HPI/OVERNIGHT EVENTS: c/o RUQ abdominal pain      REVIEW OF SYSTEMS:  as above  All other review of systems negative    T(C): 36.7 (02-28-20 @ 16:01), Max: 36.7 (02-28-20 @ 16:01)  HR: 105 (02-28-20 @ 16:01) (99 - 105)  BP: 110/57 (02-28-20 @ 16:01) (109/57 - 112/57)  RR: 18 (02-28-20 @ 16:01) (18 - 18)  SpO2: 94% (02-28-20 @ 16:01) (94% - 97%)  Wt(kg): --Vital Signs Last 24 Hrs  T(C): 36.7 (28 Feb 2020 16:01), Max: 36.7 (28 Feb 2020 16:01)  T(F): 98.1 (28 Feb 2020 16:01), Max: 98.1 (28 Feb 2020 16:01)  HR: 105 (28 Feb 2020 16:01) (99 - 105)  BP: 110/57 (28 Feb 2020 16:01) (109/57 - 112/57)  BP(mean): --  RR: 18 (28 Feb 2020 16:01) (18 - 18)  SpO2: 94% (28 Feb 2020 16:01) (94% - 97%)      02-27-20 @ 07:01  -  02-28-20 @ 07:00  --------------------------------------------------------  IN: 480 mL / OUT: 540 mL / NET: -60 mL        PHYSICAL EXAM:  GENERAL: deconditioned with temporal wasting  PSYCH: no agitation, waxing, waning mentation  NERVOUS SYSTEM:  Alert & Oriented X3 at the moment, no new focal deficits  PULMONARY: Clear to percussion bilaterally; No rales, rhonchi, wheezing, or rubs  CARDIOVASCULAR: Regular rate and rhythm; No murmurs, rubs, or gallops, aicd  GI: +khan's sign, no rebound/guarding +BS, thin  EXTREMITIES:  2+ Peripheral Pulses, No clubbing, cyanosis, or edema    Consultant(s) Notes Reviewed:  [x ] YES  [ ] NO    Discussed with Consultants/Other Providers [ x] YES     LABS                          12.9   24.58 )-----------( 264      ( 28 Feb 2020 07:02 )             41.5     02-28    137  |  95<L>  |  28<H>  ----------------------------<  193<H>  4.0   |  23  |  0.8    Ca    9.2      28 Feb 2020 07:02  Mg     2.2     02-28    TPro  7.4  /  Alb  3.5  /  TBili  2.8<H>  /  DBili  x   /  AST  43<H>  /  ALT  22  /  AlkPhos  264<H>  02-27    ABG - ( 28 Feb 2020 07:01 )  pH, Arterial: 7.48  pH, Blood: x     /  pCO2: 36    /  pO2: 104   / HCO3: 27    / Base Excess: 3.3   /  SaO2: 98                  PT/INR - ( 28 Feb 2020 07:02 )   PT: 17.30 sec;   INR: 1.50 ratio         PTT - ( 28 Feb 2020 07:02 )  PTT:27.3 sec  Lactate Trend        CAPILLARY BLOOD GLUCOSE      POCT Blood Glucose.: 179 mg/dL (28 Feb 2020 11:14)        RADIOLOGY & ADDITIONAL TESTS:    Imaging Personally Reviewed:  [ ] YES  [ ] NO    HEALTH ISSUES - PROBLEM Dx:  Encephalopathy, metabolic  Delirium due to medical condition without behavioral disturbance  Delirium          #Progress Note Handoff    Pending (specify):  Consults_________, Tests________, Test Results_______, Other_________    Family discussion:    Disposition: Home___/SNF___/Other________/Unknown at this time________

## 2020-02-28 NOTE — CONSULT NOTE ADULT - SUBJECTIVE AND OBJECTIVE BOX
INTERVENTIONAL RADIOLOGY CONSULT:     Procedure Requested: Percutaneous cholecystomy    HPI:  [64 year old gentleman]    CC: Altered mental status     PM/SH: HTN, DLD, CAD s/p CABG, HFrEF (25-30% on ECHO 2/2020, 20-25% on MUGA scan in 12/2019) s/p st carlyle BiV placement 2/2020, DM (on insulin pump), pulmonary hypertension, Celiac disease, diverticulitis, s/p right total knee replacement October 2019 complicated by infection s/p abx course w/ PICC and  right thigh with skin graft, right hip fracture s/p ORIF 2/2020     History of Present Illness goes back to the day of admission. The patient as send here from [facility] where he was for rehab after his recent ORIF. Per the records, patient was agitated and combative with staff. Patient was claiming "there was a person sneaking around by his room and he wanted the staff from NH to call the Police." Patient also stated he was scared so he was screaming.  Per the patient he does not know exactly what happened. He said he realized he was acting weird but was not aware of why. He said he got into confrontations with the nursing staff, the police and the EMS because they were being aggressive towards him.   Patient otherwise denies any hallucination/ HA/dizziness/chest pain/sob/abd pain/n/v/d.    In the ED, vitals were stable. Labs showed elevated WBC (it has been before). Na was 130 (became low after last admission), K was 5.2, creatinine was 1.9 from 0.8. Lactate was 2.4. UA was grossly positive. Per the ED team and signout and patient fields was changed and sample was from urine. Per their notes, the patient refused fields removal. (21 Feb 2020 04:11)      PAST MEDICAL & SURGICAL HISTORY:  Diabetic neuropathy  STEMI (ST elevation myocardial infarction)  Diverticulitis  MRSA bacteremia  History of celiac disease  CHF (congestive heart failure): EF ~ 25%  HTN (hypertension)  Diabetes: on insulin pump  Blood clot due to device, implant, or graft: was on blood thinners  HLD (hyperlipidemia)  Osteoarthritis  Atherosclerosis of coronary artery: CAD (coronary artery disease)  Status post percutaneous transluminal coronary angioplasty: in 2012  History of open reduction and internal fixation (ORIF) procedure  Surgery, elective: Right shoulder  Surgery, elective: right knee wound debridement  S/P TKR (total knee replacement), right: with infection Mrsa   per pt he was cleared from MRSA infection  S/P CABG x 1: 2018  Stented coronary artery: 10/18 heart attack  INFERIOR WALL MI  Other postprocedural status: Fixation hardware in foot LEFT      MEDICATIONS  (STANDING):  aspirin enteric coated 81 milliGRAM(s) Oral daily  atorvastatin Oral Tab/Cap - Peds 40 milliGRAM(s) Oral daily  BACItracin   Ointment 1 Application(s) Topical two times a day  chlorhexidine 4% Liquid 1 Application(s) Topical <User Schedule>  dextrose 5%. 1000 milliLiter(s) (50 mL/Hr) IV Continuous <Continuous>  dextrose 50% Injectable 12.5 Gram(s) IV Push once  dextrose 50% Injectable 25 Gram(s) IV Push once  dextrose 50% Injectable 25 Gram(s) IV Push once  digoxin     Tablet 0.125 milliGRAM(s) Oral daily  DULoxetine 90 milliGRAM(s) Oral daily  heparin  Injectable 5000 Unit(s) SubCutaneous every 8 hours  insulin glargine Injectable (LANTUS) 20 Unit(s) SubCutaneous at bedtime  insulin lispro (HumaLOG) corrective regimen sliding scale   SubCutaneous three times a day before meals  insulin lispro Injectable (HumaLOG) 6 Unit(s) SubCutaneous three times a day before meals  meropenem  IVPB 1000 milliGRAM(s) IV Intermittent every 8 hours  metoprolol succinate ER 50 milliGRAM(s) Oral daily  metroNIDAZOLE  IVPB      metroNIDAZOLE  IVPB 500 milliGRAM(s) IV Intermittent every 8 hours  pantoprazole    Tablet 40 milliGRAM(s) Oral at bedtime  sodium chloride 0.9%. 1000 milliLiter(s) (75 mL/Hr) IV Continuous <Continuous>  tamsulosin Oral Tab/Cap - Peds 0.4 milliGRAM(s) Oral at bedtime    MEDICATIONS  (PRN):  acetaminophen   Tablet .. 650 milliGRAM(s) Oral every 6 hours PRN Temp greater or equal to 38C (100.4F), Moderate Pain (4 - 6)  aluminum hydroxide/magnesium hydroxide/simethicone Suspension 30 milliLiter(s) Oral every 6 hours PRN Dyspepsia  dextrose 40% Gel 15 Gram(s) Oral once PRN Blood Glucose LESS THAN 70 milliGRAM(s)/deciliter  glucagon  Injectable 1 milliGRAM(s) IntraMuscular once PRN Glucose LESS THAN 70 milligrams/deciliter  ondansetron Injectable 4 milliGRAM(s) IV Push once PRN Nausea and/or Vomiting      Allergies    ACE inhibitors (Hives)  enalapril (Hives)    Intolerances    FAMILY HISTORY:  Family history of allergies: daughter  Family history of heart disease (Mother)      Physical Exam:   Vital Signs Last 24 Hrs  T(C): 36.3 (28 Feb 2020 07:27), Max: 36.6 (27 Feb 2020 21:00)  T(F): 97.3 (28 Feb 2020 07:27), Max: 97.8 (27 Feb 2020 21:00)  HR: 101 (28 Feb 2020 07:27) (92 - 101)  BP: 109/57 (28 Feb 2020 07:27) (109/57 - 124/56)  BP(mean): --  RR: 18 (28 Feb 2020 07:27) (18 - 18)  SpO2: 97% (28 Feb 2020 08:04) (94% - 97%)    General:   NAD, AAOx3  Lungs:  breathing comfortably  Abdomen:  soft, Positive [place] sign    Labs:                         12.9   24.58 )-----------( 264      ( 28 Feb 2020 07:02 )             41.5     02-28    137  |  95<L>  |  28<H>  ----------------------------<  193<H>  4.0   |  23  |  0.8    Ca    9.2      28 Feb 2020 07:02  Mg     2.2     02-28    TPro  7.4  /  Alb  3.5  /  TBili  2.8<H>  /  DBili  x   /  AST  43<H>  /  ALT  22  /  AlkPhos  264<H>  02-27    PT/INR - ( 28 Feb 2020 07:02 )   PT: 17.30 sec;   INR: 1.50 ratio         PTT - ( 28 Feb 2020 07:02 )  PTT:27.3 sec    Pertinent labs:                      12.9   24.58 )-----------( 264      ( 28 Feb 2020 07:02 )             41.5       02-28    137  |  95<L>  |  28<H>  ----------------------------<  193<H>  4.0   |  23  |  0.8    Ca    9.2      28 Feb 2020 07:02  Mg     2.2     02-28    TPro  7.4  /  Alb  3.5  /  TBili  2.8<H>  /  DBili  x   /  AST  43<H>  /  ALT  22  /  AlkPhos  264<H>  02-27      PT/INR - ( 28 Feb 2020 07:02 )   PT: 17.30 sec;   INR: 1.50 ratio         PTT - ( 28 Feb 2020 07:02 )  PTT:27.3 sec    Radiology & Additional Studies:     Radiology imaging reviewed.       ASSESSMENT/ PLAN:   64y Male with extensive PMH including CHF s/p AICD 2/11/2020, right knee replacement infection, recent R hip ORIF one month ago. Worsening anbdominal pain.   Imaging findings and HIDA positive for acute cholecystitis.  WBC 22>>24  Positive Garcia's sign  Per surgery, not a good surgical candidate due to comorbidities, CHF.  Given above and risk of worsening cholecystitis/perforation, will plan for percutaneous cholecystomy despite significant risk of bleeding as pt is on Effient.   Keep NPO.  Plan for perc sudhir today.            Risks, benefits, and alternatives to treatment discussed. All questions answered with understanding.    Thank you for the courtesy of this consult, please call [phone] with any further questions. INTERVENTIONAL RADIOLOGY CONSULT:     Procedure Requested: Percutaneous cholecystomy    HPI:  [64 year old gentleman]    CC: Altered mental status     PM/SH: HTN, DLD, CAD s/p CABG, HFrEF (25-30% on ECHO 2/2020, 20-25% on MUGA scan in 12/2019) s/p st carlyle BiV placement 2/2020, DM (on insulin pump), pulmonary hypertension, Celiac disease, diverticulitis, s/p right total knee replacement October 2019 complicated by infection s/p abx course w/ PICC and  right thigh with skin graft, right hip fracture s/p ORIF 2/2020     History of Present Illness goes back to the day of admission. The patient as send here from NH where he was for rehab after his recent ORIF. Per the records, patient was agitated and combative with staff. Patient was claiming "there was a person sneaking around by his room and he wanted the staff from NH to call the Police." Patient also stated he was scared so he was screaming.  Per the patient he does not know exactly what happened. He said he realized he was acting weird but was not aware of why. He said he got into confrontations with the nursing staff, the police and the EMS because they were being aggressive towards him.   Patient otherwise denies any hallucination/ HA/dizziness/chest pain/sob/abd pain/n/v/d.    In the ED, vitals were stable. Labs showed elevated WBC (it has been before). Na was 130 (became low after last admission), K was 5.2, creatinine was 1.9 from 0.8. Lactate was 2.4. UA was grossly positive. Per the ED team and signout and patient fields was changed and sample was from urine. Per their notes, the patient refused fields removal. (21 Feb 2020 04:11)      PAST MEDICAL & SURGICAL HISTORY:  Diabetic neuropathy  STEMI (ST elevation myocardial infarction)  Diverticulitis  MRSA bacteremia  History of celiac disease  CHF (congestive heart failure): EF ~ 25%  HTN (hypertension)  Diabetes: on insulin pump  Blood clot due to device, implant, or graft: was on blood thinners  HLD (hyperlipidemia)  Osteoarthritis  Atherosclerosis of coronary artery: CAD (coronary artery disease)  Status post percutaneous transluminal coronary angioplasty: in 2012  History of open reduction and internal fixation (ORIF) procedure  Surgery, elective: Right shoulder  Surgery, elective: right knee wound debridement  S/P TKR (total knee replacement), right: with infection Mrsa   per pt he was cleared from MRSA infection  S/P CABG x 1: 2018  Stented coronary artery: 10/18 heart attack  INFERIOR WALL MI  Other postprocedural status: Fixation hardware in foot LEFT      MEDICATIONS  (STANDING):  aspirin enteric coated 81 milliGRAM(s) Oral daily  atorvastatin Oral Tab/Cap - Peds 40 milliGRAM(s) Oral daily  BACItracin   Ointment 1 Application(s) Topical two times a day  chlorhexidine 4% Liquid 1 Application(s) Topical <User Schedule>  dextrose 5%. 1000 milliLiter(s) (50 mL/Hr) IV Continuous <Continuous>  dextrose 50% Injectable 12.5 Gram(s) IV Push once  dextrose 50% Injectable 25 Gram(s) IV Push once  dextrose 50% Injectable 25 Gram(s) IV Push once  digoxin     Tablet 0.125 milliGRAM(s) Oral daily  DULoxetine 90 milliGRAM(s) Oral daily  heparin  Injectable 5000 Unit(s) SubCutaneous every 8 hours  insulin glargine Injectable (LANTUS) 20 Unit(s) SubCutaneous at bedtime  insulin lispro (HumaLOG) corrective regimen sliding scale   SubCutaneous three times a day before meals  insulin lispro Injectable (HumaLOG) 6 Unit(s) SubCutaneous three times a day before meals  meropenem  IVPB 1000 milliGRAM(s) IV Intermittent every 8 hours  metoprolol succinate ER 50 milliGRAM(s) Oral daily  metroNIDAZOLE  IVPB      metroNIDAZOLE  IVPB 500 milliGRAM(s) IV Intermittent every 8 hours  pantoprazole    Tablet 40 milliGRAM(s) Oral at bedtime  sodium chloride 0.9%. 1000 milliLiter(s) (75 mL/Hr) IV Continuous <Continuous>  tamsulosin Oral Tab/Cap - Peds 0.4 milliGRAM(s) Oral at bedtime    MEDICATIONS  (PRN):  acetaminophen   Tablet .. 650 milliGRAM(s) Oral every 6 hours PRN Temp greater or equal to 38C (100.4F), Moderate Pain (4 - 6)  aluminum hydroxide/magnesium hydroxide/simethicone Suspension 30 milliLiter(s) Oral every 6 hours PRN Dyspepsia  dextrose 40% Gel 15 Gram(s) Oral once PRN Blood Glucose LESS THAN 70 milliGRAM(s)/deciliter  glucagon  Injectable 1 milliGRAM(s) IntraMuscular once PRN Glucose LESS THAN 70 milligrams/deciliter  ondansetron Injectable 4 milliGRAM(s) IV Push once PRN Nausea and/or Vomiting      Allergies    ACE inhibitors (Hives)  enalapril (Hives)    Intolerances    FAMILY HISTORY:  Family history of allergies: daughter  Family history of heart disease (Mother)      Physical Exam:   Vital Signs Last 24 Hrs  T(C): 36.3 (28 Feb 2020 07:27), Max: 36.6 (27 Feb 2020 21:00)  T(F): 97.3 (28 Feb 2020 07:27), Max: 97.8 (27 Feb 2020 21:00)  HR: 101 (28 Feb 2020 07:27) (92 - 101)  BP: 109/57 (28 Feb 2020 07:27) (109/57 - 124/56)  BP(mean): --  RR: 18 (28 Feb 2020 07:27) (18 - 18)  SpO2: 97% (28 Feb 2020 08:04) (94% - 97%)    General:   NAD, AAOx3  Lungs:  breathing comfortably  Abdomen:  soft, Positive [place] sign    Labs:                         12.9   24.58 )-----------( 264      ( 28 Feb 2020 07:02 )             41.5     02-28    137  |  95<L>  |  28<H>  ----------------------------<  193<H>  4.0   |  23  |  0.8    Ca    9.2      28 Feb 2020 07:02  Mg     2.2     02-28    TPro  7.4  /  Alb  3.5  /  TBili  2.8<H>  /  DBili  x   /  AST  43<H>  /  ALT  22  /  AlkPhos  264<H>  02-27    PT/INR - ( 28 Feb 2020 07:02 )   PT: 17.30 sec;   INR: 1.50 ratio         PTT - ( 28 Feb 2020 07:02 )  PTT:27.3 sec    Pertinent labs:                      12.9   24.58 )-----------( 264      ( 28 Feb 2020 07:02 )             41.5       02-28    137  |  95<L>  |  28<H>  ----------------------------<  193<H>  4.0   |  23  |  0.8    Ca    9.2      28 Feb 2020 07:02  Mg     2.2     02-28    TPro  7.4  /  Alb  3.5  /  TBili  2.8<H>  /  DBili  x   /  AST  43<H>  /  ALT  22  /  AlkPhos  264<H>  02-27      PT/INR - ( 28 Feb 2020 07:02 )   PT: 17.30 sec;   INR: 1.50 ratio         PTT - ( 28 Feb 2020 07:02 )  PTT:27.3 sec    Radiology & Additional Studies:     Radiology imaging reviewed.       ASSESSMENT/ PLAN:   64y Male with extensive PMH including CHF s/p AICD 2/11/2020, right knee replacement infection, recent R hip ORIF one month ago. Worsening anbdominal pain.   Imaging findings and HIDA positive for acute cholecystitis.  WBC 22>>24  Positive Garcia's sign  Per surgery, not a good surgical candidate due to comorbidities, CHF.  Given above and risk of worsening cholecystitis/perforation, will plan for percutaneous cholecystomy despite significant risk of bleeding as pt is on Effient.   Keep NPO.  Plan for perc sudhir today.  Discussed with Dr Garibay.            Risks, benefits, and alternatives to treatment discussed. All questions answered with understanding.    Thank you for the courtesy of this consult, please call [phone] with any further questions. INTERVENTIONAL RADIOLOGY CONSULT:     Procedure Requested: Percutaneous cholecystomy    HPI:  [64 year old gentleman]    CC: Altered mental status     PM/SH: HTN, DLD, CAD s/p CABG, HFrEF (25-30% on ECHO 2/2020, 20-25% on MUGA scan in 12/2019) s/p st carlyle BiV placement 2/2020, DM (on insulin pump), pulmonary hypertension, Celiac disease, diverticulitis, s/p right total knee replacement October 2019 complicated by infection s/p abx course w/ PICC and  right thigh with skin graft, right hip fracture s/p ORIF 2/2020     History of Present Illness goes back to the day of admission. The patient as send here from NH where he was for rehab after his recent ORIF. Per the records, patient was agitated and combative with staff. Patient was claiming "there was a person sneaking around by his room and he wanted the staff from NH to call the Police." Patient also stated he was scared so he was screaming.  Per the patient he does not know exactly what happened. He said he realized he was acting weird but was not aware of why. He said he got into confrontations with the nursing staff, the police and the EMS because they were being aggressive towards him.   Patient otherwise denies any hallucination/ HA/dizziness/chest pain/sob/abd pain/n/v/d.    In the ED, vitals were stable. Labs showed elevated WBC (it has been before). Na was 130 (became low after last admission), K was 5.2, creatinine was 1.9 from 0.8. Lactate was 2.4. UA was grossly positive. Per the ED team and signout and patient fields was changed and sample was from urine. Per their notes, the patient refused fields removal. (21 Feb 2020 04:11)      PAST MEDICAL & SURGICAL HISTORY:  Diabetic neuropathy  STEMI (ST elevation myocardial infarction)  Diverticulitis  MRSA bacteremia  History of celiac disease  CHF (congestive heart failure): EF ~ 25%  HTN (hypertension)  Diabetes: on insulin pump  Blood clot due to device, implant, or graft: was on blood thinners  HLD (hyperlipidemia)  Osteoarthritis  Atherosclerosis of coronary artery: CAD (coronary artery disease)  Status post percutaneous transluminal coronary angioplasty: in 2012  History of open reduction and internal fixation (ORIF) procedure  Surgery, elective: Right shoulder  Surgery, elective: right knee wound debridement  S/P TKR (total knee replacement), right: with infection Mrsa   per pt he was cleared from MRSA infection  S/P CABG x 1: 2018  Stented coronary artery: 10/18 heart attack  INFERIOR WALL MI  Other postprocedural status: Fixation hardware in foot LEFT      MEDICATIONS  (STANDING):  aspirin enteric coated 81 milliGRAM(s) Oral daily  atorvastatin Oral Tab/Cap - Peds 40 milliGRAM(s) Oral daily  BACItracin   Ointment 1 Application(s) Topical two times a day  chlorhexidine 4% Liquid 1 Application(s) Topical <User Schedule>  dextrose 5%. 1000 milliLiter(s) (50 mL/Hr) IV Continuous <Continuous>  dextrose 50% Injectable 12.5 Gram(s) IV Push once  dextrose 50% Injectable 25 Gram(s) IV Push once  dextrose 50% Injectable 25 Gram(s) IV Push once  digoxin     Tablet 0.125 milliGRAM(s) Oral daily  DULoxetine 90 milliGRAM(s) Oral daily  heparin  Injectable 5000 Unit(s) SubCutaneous every 8 hours  insulin glargine Injectable (LANTUS) 20 Unit(s) SubCutaneous at bedtime  insulin lispro (HumaLOG) corrective regimen sliding scale   SubCutaneous three times a day before meals  insulin lispro Injectable (HumaLOG) 6 Unit(s) SubCutaneous three times a day before meals  meropenem  IVPB 1000 milliGRAM(s) IV Intermittent every 8 hours  metoprolol succinate ER 50 milliGRAM(s) Oral daily  metroNIDAZOLE  IVPB      metroNIDAZOLE  IVPB 500 milliGRAM(s) IV Intermittent every 8 hours  pantoprazole    Tablet 40 milliGRAM(s) Oral at bedtime  sodium chloride 0.9%. 1000 milliLiter(s) (75 mL/Hr) IV Continuous <Continuous>  tamsulosin Oral Tab/Cap - Peds 0.4 milliGRAM(s) Oral at bedtime    MEDICATIONS  (PRN):  acetaminophen   Tablet .. 650 milliGRAM(s) Oral every 6 hours PRN Temp greater or equal to 38C (100.4F), Moderate Pain (4 - 6)  aluminum hydroxide/magnesium hydroxide/simethicone Suspension 30 milliLiter(s) Oral every 6 hours PRN Dyspepsia  dextrose 40% Gel 15 Gram(s) Oral once PRN Blood Glucose LESS THAN 70 milliGRAM(s)/deciliter  glucagon  Injectable 1 milliGRAM(s) IntraMuscular once PRN Glucose LESS THAN 70 milligrams/deciliter  ondansetron Injectable 4 milliGRAM(s) IV Push once PRN Nausea and/or Vomiting      Allergies    ACE inhibitors (Hives)  enalapril (Hives)    Intolerances    FAMILY HISTORY:  Family history of allergies: daughter  Family history of heart disease (Mother)      Physical Exam:   Vital Signs Last 24 Hrs  T(C): 36.3 (28 Feb 2020 07:27), Max: 36.6 (27 Feb 2020 21:00)  T(F): 97.3 (28 Feb 2020 07:27), Max: 97.8 (27 Feb 2020 21:00)  HR: 101 (28 Feb 2020 07:27) (92 - 101)  BP: 109/57 (28 Feb 2020 07:27) (109/57 - 124/56)  BP(mean): --  RR: 18 (28 Feb 2020 07:27) (18 - 18)  SpO2: 97% (28 Feb 2020 08:04) (94% - 97%)    General:   NAD, AAOx3  Lungs:  breathing comfortably  Abdomen:  soft, Positive [place] sign    Labs:                         12.9   24.58 )-----------( 264      ( 28 Feb 2020 07:02 )             41.5     02-28    137  |  95<L>  |  28<H>  ----------------------------<  193<H>  4.0   |  23  |  0.8    Ca    9.2      28 Feb 2020 07:02  Mg     2.2     02-28    TPro  7.4  /  Alb  3.5  /  TBili  2.8<H>  /  DBili  x   /  AST  43<H>  /  ALT  22  /  AlkPhos  264<H>  02-27    PT/INR - ( 28 Feb 2020 07:02 )   PT: 17.30 sec;   INR: 1.50 ratio         PTT - ( 28 Feb 2020 07:02 )  PTT:27.3 sec    Pertinent labs:                      12.9   24.58 )-----------( 264      ( 28 Feb 2020 07:02 )             41.5       02-28    137  |  95<L>  |  28<H>  ----------------------------<  193<H>  4.0   |  23  |  0.8    Ca    9.2      28 Feb 2020 07:02  Mg     2.2     02-28    TPro  7.4  /  Alb  3.5  /  TBili  2.8<H>  /  DBili  x   /  AST  43<H>  /  ALT  22  /  AlkPhos  264<H>  02-27      PT/INR - ( 28 Feb 2020 07:02 )   PT: 17.30 sec;   INR: 1.50 ratio         PTT - ( 28 Feb 2020 07:02 )  PTT:27.3 sec    Radiology & Additional Studies:     Radiology imaging reviewed.       ASSESSMENT/ PLAN:   64y Male with extensive PMH including CHF s/p AICD 2/11/2020, right knee replacement infection, recent R hip ORIF one month ago. Worsening anbdominal pain.   Imaging findings and HIDA positive for acute cholecystitis.  WBC 22>>24  Positive Garcia's sign  Per surgery, not a good surgical candidate due to comorbidities, CHF. and anti-platelet therapy.  I spoke directly with Dr Garibay of surgery.  Given the high risk of bleeding for either team, we agreed a cholecystomy tube would have less risk with possible greater benefit at this point.  If the perc cholecystomy tube results in any bleeding, we discussed that the surgical would then have to intervene.  We also discussed that, the tube will not be placed in the typical percutaneous trans-hepatic route (which prevents the catheter, which needs to stay in place for 4-6 weeks at least,  from dislodgement) but will be placed percutaneously directly into the gallbladder.  Therefore we discussed that surgery would need to intervene if there is dislodgement of the catheter resulting in a decompressed gallbladder (precluding re-entry into the glalbladder) and persistent signs of cholecystitis    Given above and risk of worsening cholecystitis/perforation, will plan for percutaneous cholecystomy despite significant risk of bleeding as pt is on Effient as above.   Keep NPO.            Risks, benefits, and alternatives to treatment discussed. All questions answered with understanding.    Thank you for the courtesy of this consult, please call [phone] with any further questions.
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
## 2020-02-28 NOTE — PROGRESS NOTE ADULT - SUBJECTIVE AND OBJECTIVE BOX
INTERVENTIONAL RADIOLOGY BRIEF-OPERATIVE NOTE    Procedure: Percutaneous cholecystostomy placement    Pre-Op Diagnosis: Cholecystitis    Post-Op Diagnosis: Same    Attending: Elliot Landau  Resident: Igor Peck    Anesthesia (type):  [ ] General Anesthesia  [x] Sedation  [ ] Spinal Anesthesia  [ x] Local/Regional    Contrast: 10 cc     Estimated Blood Loss: < 5  cc    Condition:   [ ] Critical  [ ] Serious  [ ] Fair   [x] Good    Findings/Follow up Plan of Care: Image guided placement of 8 Mongolian cholecystostomy without complication. Patient tolerated procedure well. Catheter left to external drainage.    Specimens Removed: Microbiology    Implants: None    Complications: None    Disposition: Return to unit      Please call Interventional Radiology [phone] with any questions, concerns, or issues.

## 2020-02-28 NOTE — PROGRESS NOTE ADULT - ASSESSMENT
64y Male with extensive PMH including CHF s/p AICD 2/11/2020, right knee replacement infection, recent R hip ORIF, with abdominal pain and distended gallbladder with surrounding fat stranding and cholelithiasis     PLAN:   - obtain differentiated bili- direct and indirect bilirubin, obtain Coags, trend hepatic function panel labs  Multiple medical issues, recent AICD placement, on Effient, CHF. Recommend IR consultation for percutaneous cholecystostomy tube placement  Surgery will follow

## 2020-02-28 NOTE — CONSULT NOTE ADULT - SUBJECTIVE AND OBJECTIVE BOX
LOIDA, JOHN  64y, Male  Allergy: ACE inhibitors (Hives)  enalapril (Hives)      All historical available data reviewed.    HPI:  [64 year old gentleman]    CC: Altered mental status     PM/SH: HTN, DLD, CAD s/p CABG, HFrEF (25-30% on ECHO 2/2020, 20-25% on MUGA scan in 12/2019) s/p st carllye BiV placement 2/2020, DM (on insulin pump), pulmonary hypertension, Celiac disease, diverticulitis, s/p right total knee replacement October 2019 complicated by infection s/p abx course w/ PICC and  right thigh with skin graft, right hip fracture s/p ORIF 2/2020     History of Present Illness goes back to the day of admission. The patient as send here from NH where he was for rehab after his recent ORIF. Per the records, patient was agitated and combative with staff. Patient was claiming "there was a person sneaking around by his room and he wanted the staff from NH to call the Police." Patient also stated he was scared so he was screaming.  Per the patient he does not know exactly what happened. He said he realized he was acting weird but was not aware of why. He said he got into confrontations with the nursing staff, the police and the EMS because they were being aggressive towards him.   Patient otherwise denies any hallucination/ HA/dizziness/chest pain/sob/abd pain/n/v/d.    In the ED, vitals were stable. Labs showed elevated WBC (it has been before). Na was 130 (became low after last admission), K was 5.2, creatinine was 1.9 from 0.8. Lactate was 2.4. UA was grossly positive. Per the ED team and signout and patient fields was changed and sample was from urine. Per their notes, the patient refused fields removal. (21 Feb 2020 04:11)  s/p PTC 2/28    FAMILY HISTORY:  Family history of allergies: daughter  Family history of heart disease (Mother)    PAST MEDICAL & SURGICAL HISTORY:  Diabetic neuropathy  STEMI (ST elevation myocardial infarction)  Diverticulitis  MRSA bacteremia  History of celiac disease  CHF (congestive heart failure): EF ~ 25%  HTN (hypertension)  Diabetes: on insulin pump  Blood clot due to device, implant, or graft: was on blood thinners  HLD (hyperlipidemia)  Osteoarthritis  Atherosclerosis of coronary artery: CAD (coronary artery disease)  Status post percutaneous transluminal coronary angioplasty: in 2012  History of open reduction and internal fixation (ORIF) procedure  Surgery, elective: Right shoulder  Surgery, elective: right knee wound debridement  S/P TKR (total knee replacement), right: with infection Mrsa   per pt he was cleared from MRSA infection  S/P CABG x 1: 2018  Stented coronary artery: 10/18 heart attack  INFERIOR WALL MI  Other postprocedural status: Fixation hardware in foot LEFT        VITALS:  T(F): 96.4, Max: 97.8 (02-27-20 @ 21:00)  HR: 104  BP: 112/57  RR: 18Vital Signs Last 24 Hrs  T(C): 35.8 (28 Feb 2020 12:24), Max: 36.6 (27 Feb 2020 21:00)  T(F): 96.4 (28 Feb 2020 12:24), Max: 97.8 (27 Feb 2020 21:00)  HR: 104 (28 Feb 2020 12:24) (99 - 104)  BP: 112/57 (28 Feb 2020 12:24) (109/57 - 112/57)  BP(mean): --  RR: 18 (28 Feb 2020 12:24) (18 - 18)  SpO2: 97% (28 Feb 2020 08:04) (94% - 97%)    TESTS & MEASUREMENTS:                        12.9   24.58 )-----------( 264      ( 28 Feb 2020 07:02 )             41.5     02-28    137  |  95<L>  |  28<H>  ----------------------------<  193<H>  4.0   |  23  |  0.8    Ca    9.2      28 Feb 2020 07:02  Mg     2.2     02-28    TPro  7.4  /  Alb  3.5  /  TBili  2.8<H>  /  DBili  x   /  AST  43<H>  /  ALT  22  /  AlkPhos  264<H>  02-27    LIVER FUNCTIONS - ( 27 Feb 2020 05:34 )  Alb: 3.5 g/dL / Pro: 7.4 g/dL / ALK PHOS: 264 U/L / ALT: 22 U/L / AST: 43 U/L / GGT: x                   RADIOLOGY & ADDITIONAL TESTS:  Personal review of radiological diagnostics performed  Echo and EKG results noted when applicable.     MEDICATIONS:  acetaminophen   Tablet .. 650 milliGRAM(s) Oral every 6 hours PRN  aluminum hydroxide/magnesium hydroxide/simethicone Suspension 30 milliLiter(s) Oral every 6 hours PRN  aspirin enteric coated 81 milliGRAM(s) Oral daily  atorvastatin Oral Tab/Cap - Peds 40 milliGRAM(s) Oral daily  BACItracin   Ointment 1 Application(s) Topical two times a day  chlorhexidine 4% Liquid 1 Application(s) Topical <User Schedule>  dextrose 40% Gel 15 Gram(s) Oral once PRN  dextrose 5%. 1000 milliLiter(s) IV Continuous <Continuous>  dextrose 50% Injectable 12.5 Gram(s) IV Push once  dextrose 50% Injectable 25 Gram(s) IV Push once  dextrose 50% Injectable 25 Gram(s) IV Push once  digoxin     Tablet 0.125 milliGRAM(s) Oral daily  DULoxetine 90 milliGRAM(s) Oral daily  glucagon  Injectable 1 milliGRAM(s) IntraMuscular once PRN  heparin  Injectable 5000 Unit(s) SubCutaneous every 8 hours  insulin glargine Injectable (LANTUS) 20 Unit(s) SubCutaneous at bedtime  insulin lispro (HumaLOG) corrective regimen sliding scale   SubCutaneous three times a day before meals  insulin lispro Injectable (HumaLOG) 6 Unit(s) SubCutaneous three times a day before meals  meropenem  IVPB 1000 milliGRAM(s) IV Intermittent every 8 hours  metoprolol succinate ER 50 milliGRAM(s) Oral daily  metroNIDAZOLE  IVPB      metroNIDAZOLE  IVPB 500 milliGRAM(s) IV Intermittent every 8 hours  ondansetron Injectable 4 milliGRAM(s) IV Push once PRN  pantoprazole    Tablet 40 milliGRAM(s) Oral at bedtime  sodium chloride 0.9%. 1000 milliLiter(s) IV Continuous <Continuous>  tamsulosin Oral Tab/Cap - Peds 0.4 milliGRAM(s) Oral at bedtime      ANTIBIOTICS:  meropenem  IVPB 1000 milliGRAM(s) IV Intermittent every 8 hours  metroNIDAZOLE  IVPB      metroNIDAZOLE  IVPB 500 milliGRAM(s) IV Intermittent every 8 hours

## 2020-02-28 NOTE — PROGRESS NOTE ADULT - SUBJECTIVE AND OBJECTIVE BOX
f/u s/p orif of the right hip 1/31 while in the hospital for other medical interventions we were asked to f/u his hip fx   xrays obtained 2/27 reveal a healing fx with hardware in good position and alignment .the surgical site is well healed and the pt may be wbat  next f/u in 1 month

## 2020-02-28 NOTE — CONSULT NOTE ADULT - ASSESSMENT
IMPRESSION:  #Acute cholecystitis  -HIDA 2/27 non visualization of the GB  -US/CT with GB hydrops/stones  -S/p PTC by IVR 2/28  -WBC 24.5    RECOMMENDATIONS:  -BCx  -f/u bile fluid cultures 2/28  -meropenem 1 gm iv q8h  -Flagyl 500 mg iv q8h

## 2020-02-28 NOTE — PROGRESS NOTE ADULT - SUBJECTIVE AND OBJECTIVE BOX
GENERAL SURGERY PROGRESS NOTE     FLOWER MARISCAL  64y  Male  Hospital day :7d  POD:  Procedure:   OVERNIGHT EVENTS: Uneventful    T(F): 97.8 (02-27-20 @ 21:00), Max: 97.8 (02-27-20 @ 21:00)  HR: 99 (02-27-20 @ 21:00) (88 - 99)  BP: 109/57 (02-27-20 @ 21:00) (109/57 - 124/56)  ABP: --  ABP(mean): --  RR: 18 (02-27-20 @ 21:00) (18 - 18)  SpO2: 94% (02-27-20 @ 20:00) (94% - 96%)    DIET/FLUIDS: dextrose 5%. 1000 milliLiter(s) IV Continuous <Continuous>  sodium chloride 0.9%. 1000 milliLiter(s) IV Continuous <Continuous>    NG:                                                                                DRAINS:     BM:     EMESIS:     URINE:   02-26-20 @ 07:01  -  02-27-20 @ 07:00  --------------------------------------------------------  OUT: 1120 mL     Indwelling Urethral Catheter:     Connect To:  Straight Drainage/[place]    Indication:  Urinary Retention / Obstruction (02-27-20 @ 19:13)    GI proph:  pantoprazole    Tablet 40 milliGRAM(s) Oral at bedtime    AC/ proph: aspirin enteric coated 81 milliGRAM(s) Oral daily  heparin  Injectable 5000 Unit(s) SubCutaneous every 8 hours    ABx: cefTRIAXone   IVPB 1000 milliGRAM(s) IV Intermittent every 24 hours  cefTRIAXone   IVPB      metroNIDAZOLE  IVPB      metroNIDAZOLE  IVPB 500 milliGRAM(s) IV Intermittent every 8 hours      PHYSICAL EXAM:  General: NAD,   HEENT: NCAT, DAMARIS, EOMI, Trachea ML, Neck supple  Cardiac: regular, + systolic murmur, AICD incision without induration or pain, no erythema, left upper chest  Respiratory: on room air, CTAB, normal respiratory effort, breath sounds equal BL, no wheeze, rhonchi or crackles  Abdomen: Soft, nondistended, + tenderness in the RUQ as well as generalized mild tenderness.      LABS  Labs:  CAPILLARY BLOOD GLUCOSE      POCT Blood Glucose.: 215 mg/dL (27 Feb 2020 22:14)  POCT Blood Glucose.: 183 mg/dL (27 Feb 2020 11:46)  POCT Blood Glucose.: 197 mg/dL (27 Feb 2020 07:54)                          14.3   22.67 )-----------( 285      ( 27 Feb 2020 05:34 )             49.5         02-27    138  |  95<L>  |  21<H>  ----------------------------<  172<H>  4.2   |  21  |  0.7      Magnesium, Serum: 1.9 mg/dL (02-27-20 @ 05:34)      LFTs:             7.4  | 2.8  | 43       ------------------[264     ( 27 Feb 2020 05:34 )  3.5  | x    | 22          Lipase:18     Amylase:x             Coags:     x      ----< x       ( 28 Feb 2020 00:00 )     31.4                        RADIOLOGY & ADDITIONAL TESTS:      < from: [facility] Hepatobiliary Imaging (02.27.20 @ 22:06) >  IMPRESSION:      NO DEFINITE VISUALIZATION OF THE GALLBLADDER THROUGH 4 HOURS POST-INJECTION, CONSISTENT WITH CHOLECYSTITIS,    < end of copied text >

## 2020-02-28 NOTE — PROGRESS NOTE ADULT - ATTENDING COMMENTS
63yo male with multiple medical problems including DM, previous MI s/p stents 10/2018, right knee replacement c/b infection and skin grafting, liner replacement 11/19, recent right hip fracture s/p hip ORIF 1/31/20, CHF s/p AICD placement 2/11 (recent EF 25-30%) on Effient presenting with abdominal pain. Labs significant for WBC 24, AP >200, AST/ALT 43/22, total bilirubin 2.5. US demonstrates distended gallbladder with stones and sludge, no wall thickening or pericholecystic fluid, no khan's signs, CBD 6mm. CT A/P demonstrates distended gallbladder with surrounding fat stranding and cholelithiasis. Patient likely has acute cholecystitis. Recommend trending LFT's and coags, antibiotic therapy and pain control. In light of patient's severe/complex medical issues, surgical intervention is deferred and will recommend percutaneous cholecystotomy tube placement by IR, spoke with Dr. Glover, who is planning for cholecystostomy tube via abdominal wall without liver puncture. Discussed possibility that patient may bleed or tube may become dislodged, possibly requiring surgical intervention at that time. Surgery to follow. Primary care as per primary team.
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
## 2020-02-28 NOTE — PROGRESS NOTE ADULT - SUBJECTIVE AND OBJECTIVE BOX
63y/o male     PM/SH: HTN, DLD, CAD s/p CABG, HFrEF (25-30% on ECHO 2/2020, 20-25% on MUGA scan in 12/2019) s/p st carlyle BiV placement 2/2020, DM (on insulin pump), pulmonary hypertension, Celiac disease, diverticulitis, s/p right total knee replacement October 2019 complicated by infection s/p abx course w/ PICC and  right thigh with skin graft, right hip fracture s/p ORIF 2/2020     PT seen at bedside for evaluation of right knee abrasion. pt has no complaints at this time.     Allergies:   ACE inhibitors (Hives)  enalapril (Hives)        Vital Signs Last 24 Hrs  T(C): 36.3 (28 Feb 2020 07:27), Max: 36.6 (27 Feb 2020 21:00)  T(F): 97.3 (28 Feb 2020 07:27), Max: 97.8 (27 Feb 2020 21:00)  HR: 101 (28 Feb 2020 07:27) (92 - 101)  BP: 109/57 (28 Feb 2020 07:27) (109/57 - 124/56)  BP(mean): --  RR: 18 (28 Feb 2020 07:27) (18 - 18)  SpO2: 97% (28 Feb 2020 08:04) (94% - 97%)      Exam:   right thigh: donor site healing, pink tissue, no active bleeding   right knee, SG site: pink granulating tissue, wound healing, no active bleeding. 65y/o male     PM/SH: HTN, DLD, CAD s/p CABG, HFrEF (25-30% on ECHO 2/2020, 20-25% on MUGA scan in 12/2019) s/p st carlyle BiV placement 2/2020, DM (on insulin pump), pulmonary hypertension, Celiac disease, diverticulitis, s/p right total knee replacement October 2019 complicated by infection s/p abx course w/ PICC and  right thigh with skin graft, right hip fracture s/p ORIF 2/2020     PT seen at bedside for evaluation of right knee and thigh  wound    pt has no complaints at this time.     Allergies:   ACE inhibitors (Hives)  enalapril (Hives)        Vital Signs Last 24 Hrs  T(C): 36.3 (28 Feb 2020 07:27), Max: 36.6 (27 Feb 2020 21:00)  T(F): 97.3 (28 Feb 2020 07:27), Max: 97.8 (27 Feb 2020 21:00)  HR: 101 (28 Feb 2020 07:27) (92 - 101)  BP: 109/57 (28 Feb 2020 07:27) (109/57 - 124/56)  BP(mean): --  RR: 18 (28 Feb 2020 07:27) (18 - 18)  SpO2: 97% (28 Feb 2020 08:04) (94% - 97%)      Exam:   right thigh: donor site healing, pink tissue, no active bleeding   right knee, SG site: pink granulating tissue, wound healing, no active bleeding.
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
## 2020-02-28 NOTE — PROGRESS NOTE ADULT - SUBJECTIVE AND OBJECTIVE BOX
HPI:  Currently admitted to medicine with the primary diagnosis of Metabolic encephalopathy     Today is hospital day 7d.     INTERVAL HPI / OVERNIGHT EVENTS:  Patient was examined and seen at bedside. This morning he is resting in bed still complaining of right upper quadrant pain that is now radiating to the right shouler. A sonogram previously showed gallbladder stones and sludge. received a HIDA scan yesterday, showing cholelithiasis. Patient has waxing and waning mentation, but conversational this AM. Denied cp or sob.    ROS: Otherwise unremarkable     PAST MEDICAL & SURGICAL HISTORY  Diabetic neuropathy  STEMI (ST elevation myocardial infarction)  Diverticulitis  MRSA bacteremia  History of celiac disease  CHF (congestive heart failure): EF ~ 25%  HTN (hypertension)  Diabetes: on insulin pump  Blood clot due to device, implant, or graft: was on blood thinners  HLD (hyperlipidemia)  Osteoarthritis  Atherosclerosis of coronary artery: CAD (coronary artery disease)  Status post percutaneous transluminal coronary angioplasty: in 2012  History of open reduction and internal fixation (ORIF) procedure  Surgery, elective: Right shoulder  Surgery, elective: right knee wound debridement  S/P TKR (total knee replacement), right: with infection Mrsa   per pt he was cleared from MRSA infection  S/P CABG x 1: 2018  Stented coronary artery: 10/18 heart attack  INFERIOR WALL MI  Other postprocedural status: Fixation hardware in foot LEFT    ALLERGIES  ACE inhibitors (Hives)  enalapril (Hives)    MEDICATIONS  STANDING MEDICATIONS  aspirin enteric coated 81 milliGRAM(s) Oral daily  atorvastatin Oral Tab/Cap - Peds 40 milliGRAM(s) Oral daily  BACItracin   Ointment 1 Application(s) Topical two times a day  dextrose 5%. 1000 milliLiter(s) IV Continuous <Continuous>  dextrose 50% Injectable 12.5 Gram(s) IV Push once  dextrose 50% Injectable 25 Gram(s) IV Push once  dextrose 50% Injectable 25 Gram(s) IV Push once  digoxin     Tablet 0.125 milliGRAM(s) Oral daily  DULoxetine 90 milliGRAM(s) Oral daily  heparin  Injectable 5000 Unit(s) SubCutaneous every 8 hours  insulin glargine Injectable (LANTUS) 20 Unit(s) SubCutaneous at bedtime  insulin lispro (HumaLOG) corrective regimen sliding scale   SubCutaneous three times a day before meals  insulin lispro Injectable (HumaLOG) 6 Unit(s) SubCutaneous three times a day before meals  metoprolol succinate  milliGRAM(s) Oral daily  pantoprazole    Tablet 40 milliGRAM(s) Oral before breakfast  prasugrel 10 milliGRAM(s) Oral daily  tamsulosin Oral Tab/Cap - Peds 0.4 milliGRAM(s) Oral at bedtime    PRN MEDICATIONS  acetaminophen   Tablet .. 650 milliGRAM(s) Oral every 6 hours PRN  dextrose 40% Gel 15 Gram(s) Oral once PRN  glucagon  Injectable 1 milliGRAM(s) IntraMuscular once PRN    VITALS:  T(F): 98.3  HR: 88  BP: 112/63  RR: 18  SpO2: 98%    PHYSICAL EXAM  GEN: NAD, Resting comfortably in bed  PULM: Clear to auscultation bilaterally, No wheezes  CVS: Regular rate and rhythm, S1-S2  ABD: Soft, non-distended, some tenderness in right upper quadrant. Garcia is weakly positive. No peritoneal signs. No guarding.   EXT: No edema  NEURO: AAOx3, slow mentation still    LABS                        13.0   8.38  )-----------( 292      ( 24 Feb 2020 05:56 )             43.3     02-24    137  |  94<L>  |  26<H>  ----------------------------<  179<H>  3.5   |  27  |  0.8    Ca    9.4      24 Feb 2020 05:56  Mg     2.4     02-23    TPro  6.8  /  Alb  3.5  /  TBili  1.8<H>  /  DBili  x   /  AST  30  /  ALT  20  /  AlkPhos  238<H>  02-23      RADIOLOGY    < from: US Kidney and Bladder (02.21.20 @ 09:44) >  IMPRESSION:  Mild left hydronephrosis without any obvious stones. Consider a CT for further evaluation.    < end of copied text >    < from: CT Head No Cont (02.21.20 @ 02:26) >  IMPRESSION:     1.  No acute intra-cranial pathology.    2.  Moderate chronic microvascular ischemic changes.     < end of copied text >    < from: Xray Chest 1 View- PORTABLE-Urgent (02.21.20 @ 01:16) >  Impression:      No radiographic evidence of acute cardiopulmonary disease.    < end of copied text >      < from: EEG (02.24.20 @ 12:00) >  Impression  -  Abnormal due to the presence of: generalized slowing as above    < end of copied text > HPI:  Currently admitted to medicine with the primary diagnosis of Metabolic encephalopathy     Today is hospital day 7d.     INTERVAL HPI / OVERNIGHT EVENTS:  Patient was examined and seen at bedside. This morning he is resting in bed still complaining of right upper quadrant pain that is now radiating to the right shouler. A sonogram previously showed gallbladder stones and sludge. received a HIDA scan yesterday, showing cholelithiasis. Patient has waxing and waning mentation, but conversational this AM. Denied cp or sob.    ROS: Otherwise unremarkable     PAST MEDICAL & SURGICAL HISTORY  Diabetic neuropathy  STEMI (ST elevation myocardial infarction)  Diverticulitis  MRSA bacteremia  History of celiac disease  CHF (congestive heart failure): EF ~ 25%  HTN (hypertension)  Diabetes: on insulin pump  Blood clot due to device, implant, or graft: was on blood thinners  HLD (hyperlipidemia)  Osteoarthritis  Atherosclerosis of coronary artery: CAD (coronary artery disease)  Status post percutaneous transluminal coronary angioplasty: in 2012  History of open reduction and internal fixation (ORIF) procedure  Surgery, elective: Right shoulder  Surgery, elective: right knee wound debridement  S/P TKR (total knee replacement), right: with infection Mrsa   per pt he was cleared from MRSA infection  S/P CABG x 1: 2018  Stented coronary artery: 10/18 heart attack  INFERIOR WALL MI  Other postprocedural status: Fixation hardware in foot LEFT    ALLERGIES  ACE inhibitors (Hives)  enalapril (Hives)    MEDICATIONS  STANDING MEDICATIONS  aspirin enteric coated 81 milliGRAM(s) Oral daily  atorvastatin Oral Tab/Cap - Peds 40 milliGRAM(s) Oral daily  BACItracin   Ointment 1 Application(s) Topical two times a day  dextrose 5%. 1000 milliLiter(s) IV Continuous <Continuous>  dextrose 50% Injectable 12.5 Gram(s) IV Push once  dextrose 50% Injectable 25 Gram(s) IV Push once  dextrose 50% Injectable 25 Gram(s) IV Push once  digoxin     Tablet 0.125 milliGRAM(s) Oral daily  DULoxetine 90 milliGRAM(s) Oral daily  heparin  Injectable 5000 Unit(s) SubCutaneous every 8 hours  insulin glargine Injectable (LANTUS) 20 Unit(s) SubCutaneous at bedtime  insulin lispro (HumaLOG) corrective regimen sliding scale   SubCutaneous three times a day before meals  insulin lispro Injectable (HumaLOG) 6 Unit(s) SubCutaneous three times a day before meals  metoprolol succinate  milliGRAM(s) Oral daily  pantoprazole    Tablet 40 milliGRAM(s) Oral before breakfast  prasugrel 10 milliGRAM(s) Oral daily  tamsulosin Oral Tab/Cap - Peds 0.4 milliGRAM(s) Oral at bedtime    PRN MEDICATIONS  acetaminophen   Tablet .. 650 milliGRAM(s) Oral every 6 hours PRN  dextrose 40% Gel 15 Gram(s) Oral once PRN  glucagon  Injectable 1 milliGRAM(s) IntraMuscular once PRN    VITALS:  T(F): 98.3  HR: 88  BP: 112/63  RR: 18  SpO2: 98%    PHYSICAL EXAM  GEN: NAD, Resting comfortably in bed  PULM: Clear to auscultation bilaterally, No wheezes  CVS: Regular rate and rhythm, S1-S2  ABD: Soft, non-distended, some tenderness in right upper quadrant. Garcia is weakly positive. No peritoneal signs. No guarding.   EXT: No edema  NEURO: Patient has a soft voice and is mentating slowly, but is AAOx3 (answered location correctly, told me back the reasons for platelet transfusion consent, signed his name correctly).    LABS                        13.0   8.38  )-----------( 292      ( 24 Feb 2020 05:56 )             43.3     02-24    137  |  94<L>  |  26<H>  ----------------------------<  179<H>  3.5   |  27  |  0.8    Ca    9.4      24 Feb 2020 05:56  Mg     2.4     02-23    TPro  6.8  /  Alb  3.5  /  TBili  1.8<H>  /  DBili  x   /  AST  30  /  ALT  20  /  AlkPhos  238<H>  02-23      RADIOLOGY    < from: NM Hepatobiliary Imaging (02.27.20 @ 22:06) >  IMPRESSION:      NO DEFINITE VISUALIZATION OF THE GALLBLADDER THROUGH 4 HOURS POST-INJECTION, CONSISTENT WITH CHOLECYSTITIS, AS DESCRIBED ABOVE.  CLINICAL CORRELATION IS SUGGESTED.    < end of copied text >    < from: Xray Hip 2-3 Views, Right (02.27.20 @ 15:50) >  IMPRESSION:    Gamma nail with proximal and distal interlocking screws across a subacute intertrochanteric fracture of the right femur; fracture lines still well-visualized. Alignment has improved. Moderate degenerative change in bilateral hips. Degenerative change in the lower lumbar spine.    Contrast from prior CT in the bladder. Loja catheter in place.    < end of copied text >    < from: US Kidney and Bladder (02.21.20 @ 09:44) >  IMPRESSION:  Mild left hydronephrosis without any obvious stones. Consider a CT for further evaluation.    < end of copied text >    < from: CT Head No Cont (02.21.20 @ 02:26) >  IMPRESSION:     1.  No acute intra-cranial pathology.    2.  Moderate chronic microvascular ischemic changes.     < end of copied text >    < from: Xray Chest 1 View- PORTABLE-Urgent (02.21.20 @ 01:16) >  Impression:      No radiographic evidence of acute cardiopulmonary disease.    < end of copied text >      < from: EEG (02.24.20 @ 12:00) >  Impression  -  Abnormal due to the presence of: generalized slowing as above    < end of copied text >

## 2020-02-29 LAB
ALBUMIN SERPL ELPH-MCNC: 2.7 G/DL — LOW (ref 3.5–5.2)
ALP SERPL-CCNC: 201 U/L — HIGH (ref 30–115)
ALT FLD-CCNC: 37 U/L — SIGNIFICANT CHANGE UP (ref 0–41)
ANION GAP SERPL CALC-SCNC: 13 MMOL/L — SIGNIFICANT CHANGE UP (ref 7–14)
AST SERPL-CCNC: 47 U/L — HIGH (ref 0–41)
BASOPHILS # BLD AUTO: 0.01 K/UL — SIGNIFICANT CHANGE UP (ref 0–0.2)
BASOPHILS NFR BLD AUTO: 0.1 % — SIGNIFICANT CHANGE UP (ref 0–1)
BILIRUB SERPL-MCNC: 1.8 MG/DL — HIGH (ref 0.2–1.2)
BUN SERPL-MCNC: 26 MG/DL — HIGH (ref 10–20)
CALCIUM SERPL-MCNC: 8.7 MG/DL — SIGNIFICANT CHANGE UP (ref 8.5–10.1)
CHLORIDE SERPL-SCNC: 100 MMOL/L — SIGNIFICANT CHANGE UP (ref 98–110)
CO2 SERPL-SCNC: 25 MMOL/L — SIGNIFICANT CHANGE UP (ref 17–32)
CREAT SERPL-MCNC: 0.6 MG/DL — LOW (ref 0.7–1.5)
EOSINOPHIL # BLD AUTO: 0.07 K/UL — SIGNIFICANT CHANGE UP (ref 0–0.7)
EOSINOPHIL NFR BLD AUTO: 0.5 % — SIGNIFICANT CHANGE UP (ref 0–8)
GLUCOSE BLDC GLUCOMTR-MCNC: 124 MG/DL — HIGH (ref 70–99)
GLUCOSE BLDC GLUCOMTR-MCNC: 128 MG/DL — HIGH (ref 70–99)
GLUCOSE BLDC GLUCOMTR-MCNC: 132 MG/DL — HIGH (ref 70–99)
GLUCOSE BLDC GLUCOMTR-MCNC: 136 MG/DL — HIGH (ref 70–99)
GLUCOSE SERPL-MCNC: 127 MG/DL — HIGH (ref 70–99)
GRAM STN FLD: SIGNIFICANT CHANGE UP
HCT VFR BLD CALC: 36.8 % — LOW (ref 42–52)
HGB BLD-MCNC: 11.2 G/DL — LOW (ref 14–18)
IMM GRANULOCYTES NFR BLD AUTO: 0.8 % — HIGH (ref 0.1–0.3)
LACTATE SERPL-SCNC: 0.9 MMOL/L — SIGNIFICANT CHANGE UP (ref 0.7–2)
LYMPHOCYTES # BLD AUTO: 0.61 K/UL — LOW (ref 1.2–3.4)
LYMPHOCYTES # BLD AUTO: 4.1 % — LOW (ref 20.5–51.1)
MAGNESIUM SERPL-MCNC: 2.1 MG/DL — SIGNIFICANT CHANGE UP (ref 1.8–2.4)
MCHC RBC-ENTMCNC: 22.2 PG — LOW (ref 27–31)
MCHC RBC-ENTMCNC: 30.4 G/DL — LOW (ref 32–37)
MCV RBC AUTO: 73 FL — LOW (ref 80–94)
MONOCYTES # BLD AUTO: 1.42 K/UL — HIGH (ref 0.1–0.6)
MONOCYTES NFR BLD AUTO: 9.5 % — HIGH (ref 1.7–9.3)
NEUTROPHILS # BLD AUTO: 12.75 K/UL — HIGH (ref 1.4–6.5)
NEUTROPHILS NFR BLD AUTO: 85 % — HIGH (ref 42.2–75.2)
NRBC # BLD: 0 /100 WBCS — SIGNIFICANT CHANGE UP (ref 0–0)
PLATELET # BLD AUTO: 292 K/UL — SIGNIFICANT CHANGE UP (ref 130–400)
POTASSIUM SERPL-MCNC: 3.6 MMOL/L — SIGNIFICANT CHANGE UP (ref 3.5–5)
POTASSIUM SERPL-SCNC: 3.6 MMOL/L — SIGNIFICANT CHANGE UP (ref 3.5–5)
PROT SERPL-MCNC: 5.6 G/DL — LOW (ref 6–8)
RBC # BLD: 5.04 M/UL — SIGNIFICANT CHANGE UP (ref 4.7–6.1)
RBC # FLD: 24.7 % — HIGH (ref 11.5–14.5)
SODIUM SERPL-SCNC: 138 MMOL/L — SIGNIFICANT CHANGE UP (ref 135–146)
SPECIMEN SOURCE: SIGNIFICANT CHANGE UP
WBC # BLD: 14.98 K/UL — HIGH (ref 4.8–10.8)
WBC # FLD AUTO: 14.98 K/UL — HIGH (ref 4.8–10.8)

## 2020-02-29 PROCEDURE — 99233 SBSQ HOSP IP/OBS HIGH 50: CPT

## 2020-02-29 PROCEDURE — 99232 SBSQ HOSP IP/OBS MODERATE 35: CPT

## 2020-02-29 RX ADMIN — Medication 6 UNIT(S): at 11:40

## 2020-02-29 RX ADMIN — HEPARIN SODIUM 5000 UNIT(S): 5000 INJECTION INTRAVENOUS; SUBCUTANEOUS at 13:08

## 2020-02-29 RX ADMIN — HEPARIN SODIUM 5000 UNIT(S): 5000 INJECTION INTRAVENOUS; SUBCUTANEOUS at 22:11

## 2020-02-29 RX ADMIN — Medication 81 MILLIGRAM(S): at 11:02

## 2020-02-29 RX ADMIN — Medication 0.12 MILLIGRAM(S): at 05:40

## 2020-02-29 RX ADMIN — Medication 100 MILLIGRAM(S): at 22:10

## 2020-02-29 RX ADMIN — CHLORHEXIDINE GLUCONATE 1 APPLICATION(S): 213 SOLUTION TOPICAL at 05:39

## 2020-02-29 RX ADMIN — MEROPENEM 100 MILLIGRAM(S): 1 INJECTION INTRAVENOUS at 05:39

## 2020-02-29 RX ADMIN — INSULIN GLARGINE 20 UNIT(S): 100 INJECTION, SOLUTION SUBCUTANEOUS at 22:11

## 2020-02-29 RX ADMIN — ATORVASTATIN CALCIUM 40 MILLIGRAM(S): 80 TABLET, FILM COATED ORAL at 11:02

## 2020-02-29 RX ADMIN — Medication 1 APPLICATION(S): at 17:00

## 2020-02-29 RX ADMIN — Medication 100 MILLIGRAM(S): at 14:32

## 2020-02-29 RX ADMIN — Medication 50 MILLIGRAM(S): at 05:40

## 2020-02-29 RX ADMIN — PRASUGREL 10 MILLIGRAM(S): 5 TABLET, FILM COATED ORAL at 11:02

## 2020-02-29 RX ADMIN — MEROPENEM 100 MILLIGRAM(S): 1 INJECTION INTRAVENOUS at 22:10

## 2020-02-29 RX ADMIN — DULOXETINE HYDROCHLORIDE 90 MILLIGRAM(S): 30 CAPSULE, DELAYED RELEASE ORAL at 11:02

## 2020-02-29 RX ADMIN — TAMSULOSIN HYDROCHLORIDE 0.4 MILLIGRAM(S): 0.4 CAPSULE ORAL at 22:11

## 2020-02-29 RX ADMIN — Medication 100 MILLIGRAM(S): at 05:39

## 2020-02-29 RX ADMIN — Medication 1 APPLICATION(S): at 05:40

## 2020-02-29 RX ADMIN — HEPARIN SODIUM 5000 UNIT(S): 5000 INJECTION INTRAVENOUS; SUBCUTANEOUS at 05:40

## 2020-02-29 RX ADMIN — PANTOPRAZOLE SODIUM 40 MILLIGRAM(S): 20 TABLET, DELAYED RELEASE ORAL at 22:11

## 2020-02-29 RX ADMIN — MEROPENEM 100 MILLIGRAM(S): 1 INJECTION INTRAVENOUS at 13:07

## 2020-02-29 NOTE — PROGRESS NOTE ADULT - SUBJECTIVE AND OBJECTIVE BOX
FLOWER MARISCAL  64y, Male    All available historical data reviewed    OVERNIGHT EVENTS:  no fevers.  no abdominal pain    ROS:  General: Denies rigors, night sweats  HEENT: Denies headache, rhinorrhea, sore throat, eye pain  CV: Denies CP, palpitations  PULM: Denies wheezing, hemoptysis  GI: Denies hematemesis, hematochezia, melena  : Denies discharge, hematuria  MSK: Denies arthralgias, myalgias  SKIN: Denies rash, lesions  NEURO: Denies paresthesias, weakness  PSYCH: Denies depression, anxiety    VITALS:  T(F): 97, Max: 98.2 (02-28-20 @ 20:46)  HR: 95  BP: 114/57  RR: 18Vital Signs Last 24 Hrs  T(C): 36.1 (29 Feb 2020 06:46), Max: 36.8 (28 Feb 2020 20:46)  T(F): 97 (29 Feb 2020 06:46), Max: 98.2 (28 Feb 2020 20:46)  HR: 95 (29 Feb 2020 06:46) (95 - 105)  BP: 114/57 (29 Feb 2020 06:46) (105/59 - 114/57)  BP(mean): --  RR: 18 (29 Feb 2020 06:46) (17 - 18)  SpO2: 94% (28 Feb 2020 19:51) (94% - 94%)    TESTS & MEASUREMENTS:                        11.2   14.98 )-----------( 292      ( 29 Feb 2020 05:53 )             36.8     02-29    138  |  100  |  26<H>  ----------------------------<  127<H>  3.6   |  25  |  0.6<L>    Ca    8.7      29 Feb 2020 05:53  Mg     2.1     02-29    TPro  5.6<L>  /  Alb  2.7<L>  /  TBili  1.8<H>  /  DBili  x   /  AST  47<H>  /  ALT  37  /  AlkPhos  201<H>  02-29    LIVER FUNCTIONS - ( 29 Feb 2020 05:53 )  Alb: 2.7 g/dL / Pro: 5.6 g/dL / ALK PHOS: 201 U/L / ALT: 37 U/L / AST: 47 U/L / GGT: x             Culture - Body Fluid with Gram Stain (collected 02-28-20 @ 14:15)  Source: .Body Fluid None  Gram Stain (02-29-20 @ 06:57):    No polymorphonuclear cells seen    No organisms seen    by cytocentrifuge            RADIOLOGY & ADDITIONAL TESTS:  Personal review of radiological diagnostics performed  Echo and EKG results noted when applicable.     MEDICATIONS:  acetaminophen   Tablet .. 650 milliGRAM(s) Oral every 6 hours PRN  aluminum hydroxide/magnesium hydroxide/simethicone Suspension 30 milliLiter(s) Oral every 6 hours PRN  aspirin enteric coated 81 milliGRAM(s) Oral daily  atorvastatin Oral Tab/Cap - Peds 40 milliGRAM(s) Oral daily  BACItracin   Ointment 1 Application(s) Topical two times a day  chlorhexidine 4% Liquid 1 Application(s) Topical <User Schedule>  dextrose 40% Gel 15 Gram(s) Oral once PRN  dextrose 5%. 1000 milliLiter(s) IV Continuous <Continuous>  dextrose 50% Injectable 12.5 Gram(s) IV Push once  dextrose 50% Injectable 25 Gram(s) IV Push once  dextrose 50% Injectable 25 Gram(s) IV Push once  digoxin     Tablet 0.125 milliGRAM(s) Oral daily  DULoxetine 90 milliGRAM(s) Oral daily  glucagon  Injectable 1 milliGRAM(s) IntraMuscular once PRN  heparin  Injectable 5000 Unit(s) SubCutaneous every 8 hours  insulin glargine Injectable (LANTUS) 20 Unit(s) SubCutaneous at bedtime  insulin lispro (HumaLOG) corrective regimen sliding scale   SubCutaneous three times a day before meals  insulin lispro Injectable (HumaLOG) 6 Unit(s) SubCutaneous three times a day before meals  meropenem  IVPB 1000 milliGRAM(s) IV Intermittent every 8 hours  metoprolol succinate ER 50 milliGRAM(s) Oral daily  metroNIDAZOLE  IVPB      metroNIDAZOLE  IVPB 500 milliGRAM(s) IV Intermittent every 8 hours  ondansetron Injectable 4 milliGRAM(s) IV Push once PRN  pantoprazole    Tablet 40 milliGRAM(s) Oral at bedtime  prasugrel 10 milliGRAM(s) Oral daily  sodium chloride 0.9%. 1000 milliLiter(s) IV Continuous <Continuous>  tamsulosin Oral Tab/Cap - Peds 0.4 milliGRAM(s) Oral at bedtime      ANTIBIOTICS:  meropenem  IVPB 1000 milliGRAM(s) IV Intermittent every 8 hours  metroNIDAZOLE  IVPB      metroNIDAZOLE  IVPB 500 milliGRAM(s) IV Intermittent every 8 hours

## 2020-02-29 NOTE — PROGRESS NOTE ADULT - SUBJECTIVE AND OBJECTIVE BOX
GENERAL SURGERY PROGRESS NOTE     FLOWER MARISCAL  64y  Male  Hospital day: 9    OVERNIGHT EVENTS: s/p IR perc cholecystostomy tube placement yesterday    T(F): 98.2 (02-28-20 @ 20:46), Max: 98.2 (02-28-20 @ 20:46)  HR: 98 (02-28-20 @ 20:46) (98 - 105)  BP: 105/59 (02-28-20 @ 20:46) (105/59 - 112/57)  RR: 17 (02-28-20 @ 20:46) (17 - 18)  SpO2: 94% (02-28-20 @ 19:51) (94% - 97%)    DIET/FLUIDS: dextrose 5%. 1000 milliLiter(s) IV Continuous <Continuous>  sodium chloride 0.9%. 1000 milliLiter(s) IV Continuous <Continuous>    URINE:   02-27-20 @ 07:01  -  02-28-20 @ 07:00  --------------------------------------------------------  OUT: 540 mL     Indwelling Urethral Catheter:     Connect To:  Straight Drainage/[place]    Indication:  Urinary Retention / Obstruction (02-28-20 @ 09:04)    GI proph:  pantoprazole    Tablet 40 milliGRAM(s) Oral at bedtime    AC/ proph: aspirin enteric coated 81 milliGRAM(s) Oral daily  heparin  Injectable 5000 Unit(s) SubCutaneous every 8 hours    ABx: meropenem  IVPB 1000 milliGRAM(s) IV Intermittent every 8 hours  metroNIDAZOLE  IVPB      metroNIDAZOLE  IVPB 500 milliGRAM(s) IV Intermittent every 8 hours    PHYSICAL EXAM:  General: NAD,   HEENT: NCAT, DAMARIS, EOMI, Trachea ML, Neck supple  Cardiac: regular, + systolic murmur, AICD incision without induration or pain, no erythema, left upper chest  Respiratory: on room air, CTAB, normal respiratory effort, breath sounds equal BL, no wheeze, rhonchi or crackles  Abdomen: Soft, nondistended, + tenderness in the RUQ as well as generalized mild tenderness.    LABS    POCT Blood Glucose.: 195 mg/dL (28 Feb 2020 20:55)  POCT Blood Glucose.: 188 mg/dL (28 Feb 2020 16:41)  POCT Blood Glucose.: 179 mg/dL (28 Feb 2020 11:14)  POCT Blood Glucose.: 192 mg/dL (28 Feb 2020 08:12)                          12.9   24.58 )-----------( 264      ( 28 Feb 2020 07:02 )             41.5       Auto Neutrophil %: 86.4 % (02-28-20 @ 07:02)  Auto Immature Granulocyte %: 1.8 % (02-28-20 @ 07:02)    02-28    137  |  95<L>  |  28<H>  ----------------------------<  193<H>  4.0   |  23  |  0.8      Calcium, Total Serum: 9.2 mg/dL (02-28-20 @ 07:02)    LFTs:     Blood Gas Arterial, Lactate: 1.0 mmoL/L (02-28-20 @ 07:01)    ABG - ( 28 Feb 2020 07:01 )  pH: 7.48  /  pCO2: 36    /  pO2: 104   / HCO3: 27    / Base Excess: 3.3   /  SaO2: 98        Coags:     17.30  ----< 1.50    ( 28 Feb 2020 07:02 )     27.3      RADIOLOGY & ADDITIONAL TESTS:  < from: NM Hepatobiliary Imaging (02.27.20 @ 22:06) >  IMPRESSION:      NO DEFINITE VISUALIZATION OF THE GALLBLADDER THROUGH 4 HOURS POST-INJECTION, CONSISTENT WITH CHOLECYSTITIS, AS DESCRIBED ABOVE.  CLINICAL CORRELATION IS SUGGESTED.

## 2020-02-29 NOTE — PROGRESS NOTE ADULT - SUBJECTIVE AND OBJECTIVE BOX
HPI:  Currently admitted to medicine with the primary diagnosis of Metabolic encephalopathy     Today is hospital day 8d.     INTERVAL HPI / OVERNIGHT EVENTS:  Patient was examined and seen at bedside. Patient is status-post perc sudhir placement by IR yesterday. Doing much better this AM, voice is a bit louder and his mentation is a bit improved. The drain is in plaec, with black fluid in the tubing, minimal fluid in the bag. Denies cp, sob.    ROS: Otherwise unremarkable     PAST MEDICAL & SURGICAL HISTORY  Diabetic neuropathy  STEMI (ST elevation myocardial infarction)  Diverticulitis  MRSA bacteremia  History of celiac disease  CHF (congestive heart failure): EF ~ 25%  HTN (hypertension)  Diabetes: on insulin pump  Blood clot due to device, implant, or graft: was on blood thinners  HLD (hyperlipidemia)  Osteoarthritis  Atherosclerosis of coronary artery: CAD (coronary artery disease)  Status post percutaneous transluminal coronary angioplasty: in 2012  History of open reduction and internal fixation (ORIF) procedure  Surgery, elective: Right shoulder  Surgery, elective: right knee wound debridement  S/P TKR (total knee replacement), right: with infection Mrsa   per pt he was cleared from MRSA infection  S/P CABG x 1: 2018  Stented coronary artery: 10/18 heart attack  INFERIOR WALL MI  Other postprocedural status: Fixation hardware in foot LEFT    ALLERGIES  ACE inhibitors (Hives)  enalapril (Hives)    MEDICATIONS  STANDING MEDICATIONS  aspirin enteric coated 81 milliGRAM(s) Oral daily  atorvastatin Oral Tab/Cap - Peds 40 milliGRAM(s) Oral daily  BACItracin   Ointment 1 Application(s) Topical two times a day  dextrose 5%. 1000 milliLiter(s) IV Continuous <Continuous>  dextrose 50% Injectable 12.5 Gram(s) IV Push once  dextrose 50% Injectable 25 Gram(s) IV Push once  dextrose 50% Injectable 25 Gram(s) IV Push once  digoxin     Tablet 0.125 milliGRAM(s) Oral daily  DULoxetine 90 milliGRAM(s) Oral daily  heparin  Injectable 5000 Unit(s) SubCutaneous every 8 hours  insulin glargine Injectable (LANTUS) 20 Unit(s) SubCutaneous at bedtime  insulin lispro (HumaLOG) corrective regimen sliding scale   SubCutaneous three times a day before meals  insulin lispro Injectable (HumaLOG) 6 Unit(s) SubCutaneous three times a day before meals  metoprolol succinate  milliGRAM(s) Oral daily  pantoprazole    Tablet 40 milliGRAM(s) Oral before breakfast  prasugrel 10 milliGRAM(s) Oral daily  tamsulosin Oral Tab/Cap - Peds 0.4 milliGRAM(s) Oral at bedtime    PRN MEDICATIONS  acetaminophen   Tablet .. 650 milliGRAM(s) Oral every 6 hours PRN  dextrose 40% Gel 15 Gram(s) Oral once PRN  glucagon  Injectable 1 milliGRAM(s) IntraMuscular once PRN    VITALS:  T(F): 98.3  HR: 88  BP: 112/63  RR: 18  SpO2: 98%    PHYSICAL EXAM  GEN: NAD, Resting comfortably in bed  PULM: Clear to auscultation bilaterally, No wheezes  CVS: Regular rate and rhythm, S1-S2  ABD: Soft, non-distended, biliary drain in place with black fluid. No peritoneal signs. No guarding.   EXT: No edema  NEURO: Patient has a louder voice and is mentating better. Is AAOx3     LABS                        13.0   8.38  )-----------( 292      ( 24 Feb 2020 05:56 )             43.3     02-24    137  |  94<L>  |  26<H>  ----------------------------<  179<H>  3.5   |  27  |  0.8    Ca    9.4      24 Feb 2020 05:56  Mg     2.4     02-23    TPro  6.8  /  Alb  3.5  /  TBili  1.8<H>  /  DBili  x   /  AST  30  /  ALT  20  /  AlkPhos  238<H>  02-23      RADIOLOGY    < from: NM Hepatobiliary Imaging (02.27.20 @ 22:06) >  IMPRESSION:      NO DEFINITE VISUALIZATION OF THE GALLBLADDER THROUGH 4 HOURS POST-INJECTION, CONSISTENT WITH CHOLECYSTITIS, AS DESCRIBED ABOVE.  CLINICAL CORRELATION IS SUGGESTED.    < end of copied text >    < from: Xray Hip 2-3 Views, Right (02.27.20 @ 15:50) >  IMPRESSION:    Gamma nail with proximal and distal interlocking screws across a subacute intertrochanteric fracture of the right femur; fracture lines still well-visualized. Alignment has improved. Moderate degenerative change in bilateral hips. Degenerative change in the lower lumbar spine.    Contrast from prior CT in the bladder. Loja catheter in place.    < end of copied text >    < from: US Kidney and Bladder (02.21.20 @ 09:44) >  IMPRESSION:  Mild left hydronephrosis without any obvious stones. Consider a CT for further evaluation.    < end of copied text >    < from: CT Head No Cont (02.21.20 @ 02:26) >  IMPRESSION:     1.  No acute intra-cranial pathology.    2.  Moderate chronic microvascular ischemic changes.     < end of copied text >    < from: Xray Chest 1 View- PORTABLE-Urgent (02.21.20 @ 01:16) >  Impression:      No radiographic evidence of acute cardiopulmonary disease.    < end of copied text >      < from: EEG (02.24.20 @ 12:00) >  Impression  -  Abnormal due to the presence of: generalized slowing as above    < end of copied text >

## 2020-02-29 NOTE — PROGRESS NOTE ADULT - SUBJECTIVE AND OBJECTIVE BOX
LOIDA FLOWER  64y  Male      Patient is a 64y old  Male who presents with a chief complaint of Altered mental status (29 Feb 2020 09:20)      INTERVAL HPI/OVERNIGHT EVENTS: mentation at baseline. no ruq pain today but reports some after the procedure yesterday. feeling a bit stronger      REVIEW OF SYSTEMS:  as above  All other review of systems negative    T(C): 36.1 (02-29-20 @ 06:46), Max: 36.8 (02-28-20 @ 20:46)  HR: 95 (02-29-20 @ 06:46) (95 - 98)  BP: 114/57 (02-29-20 @ 06:46) (105/59 - 114/57)  RR: 18 (02-29-20 @ 06:46) (17 - 18)  SpO2: 94% (02-28-20 @ 19:51) (94% - 94%)  Wt(kg): --Vital Signs Last 24 Hrs  T(C): 36.1 (29 Feb 2020 06:46), Max: 36.8 (28 Feb 2020 20:46)  T(F): 97 (29 Feb 2020 06:46), Max: 98.2 (28 Feb 2020 20:46)  HR: 95 (29 Feb 2020 06:46) (95 - 98)  BP: 114/57 (29 Feb 2020 06:46) (105/59 - 114/57)  BP(mean): --  RR: 18 (29 Feb 2020 06:46) (17 - 18)  SpO2: 94% (28 Feb 2020 19:51) (94% - 94%)      02-28-20 @ 07:01  -  02-29-20 @ 07:00  --------------------------------------------------------  IN: 1380 mL / OUT: 800 mL / NET: 580 mL    02-29-20 @ 07:01  -  02-29-20 @ 16:11  --------------------------------------------------------  IN: 0 mL / OUT: 575 mL / NET: -575 mL        PHYSICAL EXAM:  GENERAL: weak, temporal wasting  PSYCH: no agitation, baseline mentation  NERVOUS SYSTEM:  Alert & Oriented X3, no new focal deficits  PULMONARY: Clear to percussion bilaterally; No rales, rhonchi, wheezing, or rubs  CARDIOVASCULAR: Regular rate and rhythm; No murmurs, rubs, or gallops aicd  GI: Soft, Nontender, Nondistended; Bowel sounds present, R sided drain, dark bile   fields no sediment  EXTREMITIES:  2+ Peripheral Pulses, No clubbing, cyanosis, or edema    Consultant(s) Notes Reviewed:  [x ] YES  [ ] NO    Discussed with Consultants/Other Providers [ x] YES     LABS                          11.2   14.98 )-----------( 292      ( 29 Feb 2020 05:53 )             36.8     02-29    138  |  100  |  26<H>  ----------------------------<  127<H>  3.6   |  25  |  0.6<L>    Ca    8.7      29 Feb 2020 05:53  Mg     2.1     02-29    TPro  5.6<L>  /  Alb  2.7<L>  /  TBili  1.8<H>  /  DBili  x   /  AST  47<H>  /  ALT  37  /  AlkPhos  201<H>  02-29    ABG - ( 28 Feb 2020 07:01 )  pH, Arterial: 7.48  pH, Blood: x     /  pCO2: 36    /  pO2: 104   / HCO3: 27    / Base Excess: 3.3   /  SaO2: 98                  PT/INR - ( 28 Feb 2020 07:02 )   PT: 17.30 sec;   INR: 1.50 ratio         PTT - ( 28 Feb 2020 07:02 )  PTT:27.3 sec  Lactate Trend  02-29 @ 05:53 Lactate:0.9         CAPILLARY BLOOD GLUCOSE      POCT Blood Glucose.: 132 mg/dL (29 Feb 2020 11:32)        RADIOLOGY & ADDITIONAL TESTS:    Imaging Personally Reviewed:  [ ] YES  [ ] NO    HEALTH ISSUES - PROBLEM Dx:  Encephalopathy, metabolic  Delirium due to medical condition without behavioral disturbance  Delirium
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
## 2020-02-29 NOTE — PROGRESS NOTE ADULT - ASSESSMENT
64y Male with extensive PMH including CHF s/p AICD 2/11/2020, right knee replacement infection, recent R hip ORIF, with abdominal pain and distended gallbladder with surrounding fat stranding and cholelithiasis     PLAN:   -Multiple medical issues, recent AICD placement, on Effient, CHF  -S/P percutaneous cholecystostomy tube placement  -Trend LFTs   -Vitals monitoring

## 2020-02-29 NOTE — PROGRESS NOTE ADULT - ASSESSMENT
IMPRESSION:  #Acute cholecystitis  -HIDA 2/27 non visualization of the GB  -US/CT with GB hydrops/stones  -S/p PTC by IVR 2/28  -WBC 24.5>14.9    RECOMMENDATIONS:  -BCx  -f/u bile fluid cultures 2/28  -meropenem 1 gm iv q8h  -Flagyl 500 mg iv q8h

## 2020-02-29 NOTE — PROGRESS NOTE ADULT - ATTENDING COMMENTS
Patient seen and examined with surgery team on rounds and discussed management plans.  Patient comfortable Drain in place dark thick bile, continue antibiotics and med fU,

## 2020-03-01 LAB
ALBUMIN SERPL ELPH-MCNC: 2.3 G/DL — LOW (ref 3.5–5.2)
ALP SERPL-CCNC: 190 U/L — HIGH (ref 30–115)
ALT FLD-CCNC: 26 U/L — SIGNIFICANT CHANGE UP (ref 0–41)
ANION GAP SERPL CALC-SCNC: 11 MMOL/L — SIGNIFICANT CHANGE UP (ref 7–14)
AST SERPL-CCNC: 36 U/L — SIGNIFICANT CHANGE UP (ref 0–41)
BASOPHILS # BLD AUTO: 0.02 K/UL — SIGNIFICANT CHANGE UP (ref 0–0.2)
BASOPHILS NFR BLD AUTO: 0.2 % — SIGNIFICANT CHANGE UP (ref 0–1)
BILIRUB SERPL-MCNC: 1.4 MG/DL — HIGH (ref 0.2–1.2)
BUN SERPL-MCNC: 18 MG/DL — SIGNIFICANT CHANGE UP (ref 10–20)
CALCIUM SERPL-MCNC: 7.9 MG/DL — LOW (ref 8.5–10.1)
CHLORIDE SERPL-SCNC: 99 MMOL/L — SIGNIFICANT CHANGE UP (ref 98–110)
CO2 SERPL-SCNC: 25 MMOL/L — SIGNIFICANT CHANGE UP (ref 17–32)
CREAT SERPL-MCNC: 0.5 MG/DL — LOW (ref 0.7–1.5)
EOSINOPHIL # BLD AUTO: 0.29 K/UL — SIGNIFICANT CHANGE UP (ref 0–0.7)
EOSINOPHIL NFR BLD AUTO: 3.3 % — SIGNIFICANT CHANGE UP (ref 0–8)
GLUCOSE BLDC GLUCOMTR-MCNC: 115 MG/DL — HIGH (ref 70–99)
GLUCOSE BLDC GLUCOMTR-MCNC: 133 MG/DL — HIGH (ref 70–99)
GLUCOSE BLDC GLUCOMTR-MCNC: 140 MG/DL — HIGH (ref 70–99)
GLUCOSE BLDC GLUCOMTR-MCNC: 185 MG/DL — HIGH (ref 70–99)
GLUCOSE SERPL-MCNC: 128 MG/DL — HIGH (ref 70–99)
HCT VFR BLD CALC: 34 % — LOW (ref 42–52)
HGB BLD-MCNC: 10.6 G/DL — LOW (ref 14–18)
IMM GRANULOCYTES NFR BLD AUTO: 0.3 % — SIGNIFICANT CHANGE UP (ref 0.1–0.3)
LYMPHOCYTES # BLD AUTO: 0.63 K/UL — LOW (ref 1.2–3.4)
LYMPHOCYTES # BLD AUTO: 7.2 % — LOW (ref 20.5–51.1)
MAGNESIUM SERPL-MCNC: 1.8 MG/DL — SIGNIFICANT CHANGE UP (ref 1.8–2.4)
MCHC RBC-ENTMCNC: 22.7 PG — LOW (ref 27–31)
MCHC RBC-ENTMCNC: 31.2 G/DL — LOW (ref 32–37)
MCV RBC AUTO: 72.8 FL — LOW (ref 80–94)
MONOCYTES # BLD AUTO: 0.82 K/UL — HIGH (ref 0.1–0.6)
MONOCYTES NFR BLD AUTO: 9.3 % — SIGNIFICANT CHANGE UP (ref 1.7–9.3)
NEUTROPHILS # BLD AUTO: 6.99 K/UL — HIGH (ref 1.4–6.5)
NEUTROPHILS NFR BLD AUTO: 79.7 % — HIGH (ref 42.2–75.2)
NRBC # BLD: 0 /100 WBCS — SIGNIFICANT CHANGE UP (ref 0–0)
PLATELET # BLD AUTO: 245 K/UL — SIGNIFICANT CHANGE UP (ref 130–400)
POTASSIUM SERPL-MCNC: 3.2 MMOL/L — LOW (ref 3.5–5)
POTASSIUM SERPL-SCNC: 3.2 MMOL/L — LOW (ref 3.5–5)
PROT SERPL-MCNC: 5 G/DL — LOW (ref 6–8)
RBC # BLD: 4.67 M/UL — LOW (ref 4.7–6.1)
RBC # FLD: 24.4 % — HIGH (ref 11.5–14.5)
SODIUM SERPL-SCNC: 135 MMOL/L — SIGNIFICANT CHANGE UP (ref 135–146)
WBC # BLD: 8.78 K/UL — SIGNIFICANT CHANGE UP (ref 4.8–10.8)
WBC # FLD AUTO: 8.78 K/UL — SIGNIFICANT CHANGE UP (ref 4.8–10.8)

## 2020-03-01 PROCEDURE — 99231 SBSQ HOSP IP/OBS SF/LOW 25: CPT | Mod: GC

## 2020-03-01 PROCEDURE — 99233 SBSQ HOSP IP/OBS HIGH 50: CPT

## 2020-03-01 RX ADMIN — MEROPENEM 100 MILLIGRAM(S): 1 INJECTION INTRAVENOUS at 05:30

## 2020-03-01 RX ADMIN — Medication 6 UNIT(S): at 12:04

## 2020-03-01 RX ADMIN — Medication 6 UNIT(S): at 16:43

## 2020-03-01 RX ADMIN — INSULIN GLARGINE 20 UNIT(S): 100 INJECTION, SOLUTION SUBCUTANEOUS at 21:57

## 2020-03-01 RX ADMIN — Medication 100 MILLIGRAM(S): at 05:30

## 2020-03-01 RX ADMIN — HEPARIN SODIUM 5000 UNIT(S): 5000 INJECTION INTRAVENOUS; SUBCUTANEOUS at 21:57

## 2020-03-01 RX ADMIN — MEROPENEM 100 MILLIGRAM(S): 1 INJECTION INTRAVENOUS at 21:57

## 2020-03-01 RX ADMIN — Medication 50 MILLIGRAM(S): at 05:31

## 2020-03-01 RX ADMIN — PRASUGREL 10 MILLIGRAM(S): 5 TABLET, FILM COATED ORAL at 11:00

## 2020-03-01 RX ADMIN — Medication 100 MILLIGRAM(S): at 21:57

## 2020-03-01 RX ADMIN — Medication 6 UNIT(S): at 07:49

## 2020-03-01 RX ADMIN — TAMSULOSIN HYDROCHLORIDE 0.4 MILLIGRAM(S): 0.4 CAPSULE ORAL at 21:57

## 2020-03-01 RX ADMIN — HEPARIN SODIUM 5000 UNIT(S): 5000 INJECTION INTRAVENOUS; SUBCUTANEOUS at 05:31

## 2020-03-01 RX ADMIN — ATORVASTATIN CALCIUM 40 MILLIGRAM(S): 80 TABLET, FILM COATED ORAL at 11:01

## 2020-03-01 RX ADMIN — Medication 100 MILLIGRAM(S): at 13:01

## 2020-03-01 RX ADMIN — HEPARIN SODIUM 5000 UNIT(S): 5000 INJECTION INTRAVENOUS; SUBCUTANEOUS at 13:01

## 2020-03-01 RX ADMIN — CHLORHEXIDINE GLUCONATE 1 APPLICATION(S): 213 SOLUTION TOPICAL at 05:30

## 2020-03-01 RX ADMIN — DULOXETINE HYDROCHLORIDE 90 MILLIGRAM(S): 30 CAPSULE, DELAYED RELEASE ORAL at 11:00

## 2020-03-01 RX ADMIN — SODIUM CHLORIDE 75 MILLILITER(S): 9 INJECTION INTRAMUSCULAR; INTRAVENOUS; SUBCUTANEOUS at 05:06

## 2020-03-01 RX ADMIN — Medication 2: at 16:43

## 2020-03-01 RX ADMIN — Medication 81 MILLIGRAM(S): at 11:01

## 2020-03-01 RX ADMIN — Medication 1 APPLICATION(S): at 05:31

## 2020-03-01 RX ADMIN — MEROPENEM 100 MILLIGRAM(S): 1 INJECTION INTRAVENOUS at 15:39

## 2020-03-01 RX ADMIN — Medication 0.12 MILLIGRAM(S): at 05:31

## 2020-03-01 RX ADMIN — Medication 1 APPLICATION(S): at 17:03

## 2020-03-01 RX ADMIN — PANTOPRAZOLE SODIUM 40 MILLIGRAM(S): 20 TABLET, DELAYED RELEASE ORAL at 21:57
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
## 2020-03-01 NOTE — PROGRESS NOTE ADULT - SUBJECTIVE AND OBJECTIVE BOX
FLOWER MARISCAL  64y  Male      Patient is a 64y old  Male who presents with a chief complaint of Altered mental status (01 Mar 2020 04:10)      INTERVAL HPI/OVERNIGHT EVENTS: no abdominal pain. no chest pressure. weak      REVIEW OF SYSTEMS:  as above  All other review of systems negative    T(C): 35.7 (03-01-20 @ 06:00), Max: 36.2 (02-29-20 @ 21:08)  HR: 96 (03-01-20 @ 06:00) (90 - 96)  BP: 114/64 (03-01-20 @ 06:00) (99/55 - 114/64)  RR: 18 (03-01-20 @ 06:00) (18 - 18)  SpO2: --  Wt(kg): --Vital Signs Last 24 Hrs  T(C): 35.7 (01 Mar 2020 06:00), Max: 36.2 (29 Feb 2020 21:08)  T(F): 96.2 (01 Mar 2020 06:00), Max: 97.2 (29 Feb 2020 21:08)  HR: 96 (01 Mar 2020 06:00) (90 - 96)  BP: 114/64 (01 Mar 2020 06:00) (99/55 - 114/64)  BP(mean): --  RR: 18 (01 Mar 2020 06:00) (18 - 18)  SpO2: --      02-29-20 @ 07:01  -  03-01-20 @ 07:00  --------------------------------------------------------  IN: 0 mL / OUT: 1285 mL / NET: -1285 mL    03-01-20 @ 07:01  -  03-01-20 @ 13:50  --------------------------------------------------------  IN: 0 mL / OUT: 400 mL / NET: -400 mL        PHYSICAL EXAM:  GENERAL: debilitated, temporal wasting  PSYCH: no agitation, baseline mentation  NERVOUS SYSTEM:  Alert & Oriented X3, no new focal deficits  PULMONARY: Clear to percussion bilaterally; No rales, rhonchi, wheezing, or rubs  CARDIOVASCULAR: Regular rate and rhythm; No murmurs, rubs, or gallops, aicd  GI: Soft, Nontender, Nondistended; Bowel sounds present R sided drain, dark bilious output   no fields  EXTREMITIES:  2+ Peripheral Pulses, No clubbing, cyanosis, or edema    Consultant(s) Notes Reviewed:  [x ] YES  [ ] NO    Discussed with Consultants/Other Providers [ x] YES     LABS                          10.6   8.78  )-----------( 245      ( 01 Mar 2020 05:55 )             34.0     03-01    135  |  99  |  18  ----------------------------<  128<H>  3.2<L>   |  25  |  0.5<L>    Ca    7.9<L>      01 Mar 2020 05:55  Mg     1.8     03-01    TPro  5.0<L>  /  Alb  2.3<L>  /  TBili  1.4<H>  /  DBili  x   /  AST  36  /  ALT  26  /  AlkPhos  190<H>  03-01          Lactate Trend  02-29 @ 05:53 Lactate:0.9         CAPILLARY BLOOD GLUCOSE      POCT Blood Glucose.: 140 mg/dL (01 Mar 2020 12:00)        RADIOLOGY & ADDITIONAL TESTS:    Imaging Personally Reviewed:  [ ] YES  [ ] NO    HEALTH ISSUES - PROBLEM Dx:  Encephalopathy, metabolic  Delirium due to medical condition without behavioral disturbance  Delirium

## 2020-03-01 NOTE — PROGRESS NOTE ADULT - ASSESSMENT
64y Male with extensive PMH including CHF s/p AICD 2/11/2020, right knee replacement infection, recent R hip ORIF, with abdominal pain and distended gallbladder with surrounding fat stranding and cholelithiasis     PLAN:   -Multiple medical issues, recent AICD placement, on Effient, CHF  -S/P percutaneous cholecystostomy tube placement  -Trend LFTs 64y Male with extensive PMH including CHF s/p AICD 2/11/2020, right knee replacement infection, recent R hip ORIF, with abdominal pain and distended gallbladder with surrounding fat stranding and cholelithiasis     PLAN:   -Multiple medical issues, recent AICD placement, on Effient, CHF  -S/P percutaneous cholecystostomy tube placement  -Trend LFTs   -please reconsult surgery PRN
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
## 2020-03-01 NOTE — PROGRESS NOTE ADULT - SUBJECTIVE AND OBJECTIVE BOX
GENERAL SURGERY PROGRESS NOTE     FLOWER MARISCAL  [address] [place] [address] day :9d  POD:  Procedure:   Surgical Attending: Flower Travis  Overnight events: Abdominal pain well controlled. Tolerating FLD without nausea or vomiting. Loja out and follow up TOV.    T(F): 97.2 (02-29-20 @ 21:08), Max: 97.2 (02-29-20 @ 21:08)  HR: 90 (03-01-20 @ 01:56) (90 - 95)  BP: 104/56 (03-01-20 @ 01:56) (99/55 - 114/57)  ABP: --  ABP(mean): --  RR: 18 (02-29-20 @ 21:08) (18 - 18)  SpO2: --      02-28-20 @ 07:01  -  02-29-20 @ 07:00  --------------------------------------------------------  IN:    Oral Fluid: 480 mL    sodium chloride 0.9%.: 900 mL  Total IN: 1380 mL    OUT:    Indwelling Catheter - Urethral: 800 mL  Total OUT: 800 mL    Total NET: 580 mL      02-29-20 @ 07:01  -  03-01-20 @ 04:11  --------------------------------------------------------  IN:  Total IN: 0 mL    OUT:    Indwelling Catheter - Urethral: 675 mL    Voided: 610 mL  Total OUT: 1285 mL    Total NET: -1285 mL        DIET/FLUIDS: dextrose 5%. 1000 milliLiter(s) IV Continuous <Continuous>  sodium chloride 0.9%. 1000 milliLiter(s) IV Continuous <Continuous>  02-28-20 @ 07:01  -  02-29-20 @ 07:00  --------------------------------------------------------  OUT: 800 mL     Indwelling Urethral Catheter:     Connect To:  Straight Drainage/[place]    Indication:  Urinary Retention / Obstruction (02-29-20 @ 08:29)    GI proph:  pantoprazole    Tablet 40 milliGRAM(s) Oral at bedtime    AC/ proph: aspirin enteric coated 81 milliGRAM(s) Oral daily  heparin  Injectable 5000 Unit(s) SubCutaneous every 8 hours    ABx: meropenem  IVPB 1000 milliGRAM(s) IV Intermittent every 8 hours  metroNIDAZOLE  IVPB      metroNIDAZOLE  IVPB 500 milliGRAM(s) IV Intermittent every 8 hours      PHYSICAL EXAM:  GENERAL: NAD, well-appearing  CHEST/LUNG: Clear to auscultation bilaterally  HEART: regular, + systolic murmur, AICD incision without induration or pain, no erythema, left upper chest  ABDOMEN: Soft, Nontender, Nondistended; RUQ perchole tube  EXTREMITIES:  No clubbing, cyanosis, or edema      LABS  Labs:  CAPILLARY BLOOD GLUCOSE      POCT Blood Glucose.: 124 mg/dL (29 Feb 2020 22:00)  POCT Blood Glucose.: 136 mg/dL (29 Feb 2020 16:40)  POCT Blood Glucose.: 132 mg/dL (29 Feb 2020 11:32)  POCT Blood Glucose.: 128 mg/dL (29 Feb 2020 08:14)                          11.2   14.98 )-----------( 292      ( 29 Feb 2020 05:53 )             36.8       Auto Immature Granulocyte %: 0.8 % (02-29-20 @ 05:53)  Auto Neutrophil %: 85.0 % (02-29-20 @ 05:53)    02-29    138  |  100  |  26<H>  ----------------------------<  127<H>  3.6   |  25  |  0.6<L>      Calcium, Total Serum: 8.7 mg/dL (02-29-20 @ 05:53)      LFTs:             5.6  | 1.8  | 47       ------------------[201     ( 29 Feb 2020 05:53 )  2.7  | x    | 37          Lipase:x      Amylase:x         Lactate, Blood: 0.9 mmol/L (02-29-20 @ 05:53)  Blood Gas Arterial, Lactate: 1.0 mmoL/L (02-28-20 @ 07:01)    ABG - ( 28 Feb 2020 07:01 )  pH: 7.48  /  pCO2: 36    /  pO2: 104   / HCO3: 27    / Base Excess: 3.3   /  SaO2: 98                Coags:     17.30  ----< 1.50    ( 28 Feb 2020 07:02 )     27.3                    Culture - Body Fluid with Gram Stain (collected 28 Feb 2020 14:15)  Source: .Body Fluid None  Gram Stain (29 Feb 2020 06:57):    No polymorphonuclear cells seen    No organisms seen    by cytocentrifuge    Culture - Blood (collected 28 Feb 2020 07:02)  Source: .Blood None  Preliminary Report (29 Feb 2020 14:01):    No growth to date.    Culture - Blood (collected 28 Feb 2020 03:43)  Source: .Blood Blood  Preliminary Report (29 Feb 2020 14:01):    No growth to date.          RADIOLOGY & ADDITIONAL TESTS:

## 2020-03-02 LAB
ALBUMIN SERPL ELPH-MCNC: 2.3 G/DL — LOW (ref 3.5–5.2)
ALP SERPL-CCNC: 185 U/L — HIGH (ref 30–115)
ALT FLD-CCNC: 22 U/L — SIGNIFICANT CHANGE UP (ref 0–41)
ANION GAP SERPL CALC-SCNC: 12 MMOL/L — SIGNIFICANT CHANGE UP (ref 7–14)
AST SERPL-CCNC: 35 U/L — SIGNIFICANT CHANGE UP (ref 0–41)
BASOPHILS # BLD AUTO: 0.03 K/UL — SIGNIFICANT CHANGE UP (ref 0–0.2)
BASOPHILS NFR BLD AUTO: 0.3 % — SIGNIFICANT CHANGE UP (ref 0–1)
BILIRUB SERPL-MCNC: 1.1 MG/DL — SIGNIFICANT CHANGE UP (ref 0.2–1.2)
BUN SERPL-MCNC: 14 MG/DL — SIGNIFICANT CHANGE UP (ref 10–20)
CALCIUM SERPL-MCNC: 8.1 MG/DL — LOW (ref 8.5–10.1)
CHLORIDE SERPL-SCNC: 102 MMOL/L — SIGNIFICANT CHANGE UP (ref 98–110)
CO2 SERPL-SCNC: 24 MMOL/L — SIGNIFICANT CHANGE UP (ref 17–32)
CREAT SERPL-MCNC: 0.5 MG/DL — LOW (ref 0.7–1.5)
EOSINOPHIL # BLD AUTO: 0.23 K/UL — SIGNIFICANT CHANGE UP (ref 0–0.7)
EOSINOPHIL NFR BLD AUTO: 2.7 % — SIGNIFICANT CHANGE UP (ref 0–8)
GLUCOSE BLDC GLUCOMTR-MCNC: 108 MG/DL — HIGH (ref 70–99)
GLUCOSE BLDC GLUCOMTR-MCNC: 130 MG/DL — HIGH (ref 70–99)
GLUCOSE BLDC GLUCOMTR-MCNC: 170 MG/DL — HIGH (ref 70–99)
GLUCOSE BLDC GLUCOMTR-MCNC: 177 MG/DL — HIGH (ref 70–99)
GLUCOSE SERPL-MCNC: 107 MG/DL — HIGH (ref 70–99)
HCT VFR BLD CALC: 35.3 % — LOW (ref 42–52)
HGB BLD-MCNC: 10.6 G/DL — LOW (ref 14–18)
IMM GRANULOCYTES NFR BLD AUTO: 0.3 % — SIGNIFICANT CHANGE UP (ref 0.1–0.3)
LYMPHOCYTES # BLD AUTO: 0.68 K/UL — LOW (ref 1.2–3.4)
LYMPHOCYTES # BLD AUTO: 7.9 % — LOW (ref 20.5–51.1)
MAGNESIUM SERPL-MCNC: 1.8 MG/DL — SIGNIFICANT CHANGE UP (ref 1.8–2.4)
MCHC RBC-ENTMCNC: 21.7 PG — LOW (ref 27–31)
MCHC RBC-ENTMCNC: 30 G/DL — LOW (ref 32–37)
MCV RBC AUTO: 72.2 FL — LOW (ref 80–94)
MONOCYTES # BLD AUTO: 0.76 K/UL — HIGH (ref 0.1–0.6)
MONOCYTES NFR BLD AUTO: 8.8 % — SIGNIFICANT CHANGE UP (ref 1.7–9.3)
NEUTROPHILS # BLD AUTO: 6.87 K/UL — HIGH (ref 1.4–6.5)
NEUTROPHILS NFR BLD AUTO: 80 % — HIGH (ref 42.2–75.2)
NRBC # BLD: 0 /100 WBCS — SIGNIFICANT CHANGE UP (ref 0–0)
PLATELET # BLD AUTO: 258 K/UL — SIGNIFICANT CHANGE UP (ref 130–400)
POTASSIUM SERPL-MCNC: 3.4 MMOL/L — LOW (ref 3.5–5)
POTASSIUM SERPL-SCNC: 3.4 MMOL/L — LOW (ref 3.5–5)
PROT SERPL-MCNC: 5.1 G/DL — LOW (ref 6–8)
RBC # BLD: 4.89 M/UL — SIGNIFICANT CHANGE UP (ref 4.7–6.1)
RBC # FLD: 24.6 % — HIGH (ref 11.5–14.5)
SODIUM SERPL-SCNC: 138 MMOL/L — SIGNIFICANT CHANGE UP (ref 135–146)
WBC # BLD: 8.6 K/UL — SIGNIFICANT CHANGE UP (ref 4.8–10.8)
WBC # FLD AUTO: 8.6 K/UL — SIGNIFICANT CHANGE UP (ref 4.8–10.8)

## 2020-03-02 PROCEDURE — 99233 SBSQ HOSP IP/OBS HIGH 50: CPT

## 2020-03-02 RX ADMIN — Medication 6 UNIT(S): at 17:10

## 2020-03-02 RX ADMIN — Medication 50 MILLIGRAM(S): at 06:14

## 2020-03-02 RX ADMIN — PANTOPRAZOLE SODIUM 40 MILLIGRAM(S): 20 TABLET, DELAYED RELEASE ORAL at 21:35

## 2020-03-02 RX ADMIN — TAMSULOSIN HYDROCHLORIDE 0.4 MILLIGRAM(S): 0.4 CAPSULE ORAL at 21:35

## 2020-03-02 RX ADMIN — Medication 1 APPLICATION(S): at 17:10

## 2020-03-02 RX ADMIN — Medication 100 MILLIGRAM(S): at 21:35

## 2020-03-02 RX ADMIN — MEROPENEM 100 MILLIGRAM(S): 1 INJECTION INTRAVENOUS at 15:17

## 2020-03-02 RX ADMIN — MEROPENEM 100 MILLIGRAM(S): 1 INJECTION INTRAVENOUS at 21:35

## 2020-03-02 RX ADMIN — Medication 1 APPLICATION(S): at 06:14

## 2020-03-02 RX ADMIN — HEPARIN SODIUM 5000 UNIT(S): 5000 INJECTION INTRAVENOUS; SUBCUTANEOUS at 06:14

## 2020-03-02 RX ADMIN — PRASUGREL 10 MILLIGRAM(S): 5 TABLET, FILM COATED ORAL at 11:24

## 2020-03-02 RX ADMIN — Medication 6 UNIT(S): at 11:51

## 2020-03-02 RX ADMIN — Medication 6 UNIT(S): at 08:01

## 2020-03-02 RX ADMIN — HEPARIN SODIUM 5000 UNIT(S): 5000 INJECTION INTRAVENOUS; SUBCUTANEOUS at 21:40

## 2020-03-02 RX ADMIN — Medication 2: at 11:51

## 2020-03-02 RX ADMIN — ATORVASTATIN CALCIUM 40 MILLIGRAM(S): 80 TABLET, FILM COATED ORAL at 11:24

## 2020-03-02 RX ADMIN — Medication 100 MILLIGRAM(S): at 06:13

## 2020-03-02 RX ADMIN — MEROPENEM 100 MILLIGRAM(S): 1 INJECTION INTRAVENOUS at 06:13

## 2020-03-02 RX ADMIN — Medication 100 MILLIGRAM(S): at 15:17

## 2020-03-02 RX ADMIN — Medication 81 MILLIGRAM(S): at 11:24

## 2020-03-02 RX ADMIN — DULOXETINE HYDROCHLORIDE 90 MILLIGRAM(S): 30 CAPSULE, DELAYED RELEASE ORAL at 11:24

## 2020-03-02 RX ADMIN — SODIUM CHLORIDE 75 MILLILITER(S): 9 INJECTION INTRAMUSCULAR; INTRAVENOUS; SUBCUTANEOUS at 00:40

## 2020-03-02 RX ADMIN — CHLORHEXIDINE GLUCONATE 1 APPLICATION(S): 213 SOLUTION TOPICAL at 06:13

## 2020-03-02 RX ADMIN — HEPARIN SODIUM 5000 UNIT(S): 5000 INJECTION INTRAVENOUS; SUBCUTANEOUS at 15:16

## 2020-03-02 RX ADMIN — INSULIN GLARGINE 20 UNIT(S): 100 INJECTION, SOLUTION SUBCUTANEOUS at 21:35

## 2020-03-02 RX ADMIN — Medication 0.12 MILLIGRAM(S): at 06:14
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
## 2020-03-02 NOTE — PROGRESS NOTE ADULT - SUBJECTIVE AND OBJECTIVE BOX
FLOWER MARISCAL  64y  Male      Patient is a 64y old  Male who presents with a chief complaint of Altered mental status (01 Mar 2020 13:50)      INTERVAL HPI/OVERNIGHT EVENTS: no abdominal pain. mentation is baseline. eating more      REVIEW OF SYSTEMS:  as above  All other review of systems negative    T(C): 36.3 (03-02-20 @ 12:44), Max: 36.9 (03-02-20 @ 05:51)  HR: 95 (03-02-20 @ 05:51) (95 - 98)  BP: 107/62 (03-02-20 @ 05:51) (107/62 - 113/60)  RR: 18 (03-02-20 @ 05:51) (18 - 18)  SpO2: --  Wt(kg): --Vital Signs Last 24 Hrs  T(C): 36.3 (02 Mar 2020 12:44), Max: 36.9 (02 Mar 2020 05:51)  T(F): 97.4 (02 Mar 2020 12:44), Max: 98.5 (02 Mar 2020 05:51)  HR: 95 (02 Mar 2020 05:51) (95 - 98)  BP: 107/62 (02 Mar 2020 05:51) (107/62 - 113/60)  BP(mean): --  RR: 18 (02 Mar 2020 05:51) (18 - 18)  SpO2: --      03-01-20 @ 07:01  -  03-02-20 @ 07:00  --------------------------------------------------------  IN: 1000 mL / OUT: 675 mL / NET: 325 mL        PHYSICAL EXAM:  GENERAL: deconditioned, temporal wasting  PSYCH: no agitation, baseline mentation  NERVOUS SYSTEM:  Alert & Oriented X3, no new focal deficits  PULMONARY: Clear to percussion bilaterally; No rales, rhonchi, wheezing, or rubs  CARDIOVASCULAR: Regular rate and rhythm; No murmurs, rubs, or gallops, aicd  GI: Soft, Nontender, Nondistended; Bowel sounds present R sided drain with dark biliary output   no fields  EXTREMITIES:  2+ Peripheral Pulses, No clubbing, cyanosis, or edema  SKIN sacral rash with satellite lesions    Consultant(s) Notes Reviewed:  [x ] YES  [ ] NO    Discussed with Consultants/Other Providers [ x] YES     LABS                          10.6   8.60  )-----------( 258      ( 02 Mar 2020 05:52 )             35.3     03-02    138  |  102  |  14  ----------------------------<  107<H>  3.4<L>   |  24  |  0.5<L>    Ca    8.1<L>      02 Mar 2020 05:52  Mg     1.8     03-02    TPro  5.1<L>  /  Alb  2.3<L>  /  TBili  1.1  /  DBili  x   /  AST  35  /  ALT  22  /  AlkPhos  185<H>  03-02          Lactate Trend  02-29 @ 05:53 Lactate:0.9         CAPILLARY BLOOD GLUCOSE      POCT Blood Glucose.: 177 mg/dL (02 Mar 2020 11:43)        RADIOLOGY & ADDITIONAL TESTS:    Imaging Personally Reviewed:  [ ] YES  [ ] NO    HEALTH ISSUES - PROBLEM Dx:  Encephalopathy, metabolic  Delirium due to medical condition without behavioral disturbance  Delirium

## 2020-03-02 NOTE — CHART NOTE - NSCHARTNOTEFT_GEN_A_CORE
Registered Dietitian Follow-Up     Patient Profile Reviewed                           Yes [x]   No []     Nutrition History Previously Obtained        Yes [x]  No []      Pertinent Medical Interventions: Pt s/p percutaneous drainage on 2/28-- f/u drain culture, f/u output still ongoing. Celiac disease--pt asymptomatic at this time.    Diet order: CHO consistent/HS + DASH/TLC + 1L fluid restriction + Gluten-Gliadin restriction + Ensure Compact BID--at time of RD visit, pt was consuming a deli sandwich brought in by pt's wife, ate more than half of it. Pt would like for gluten restriction to be removed as he says he doesn't have celiac any more; discussed this with Dr. Park, who said she will look into it. Pt doesn't like Ensure Compact and would also like that to be d/c'd as he states he's been eating fine (a little >50%).      Anthropometrics:  - Ht. 73"  - Wt. 82kg (3/1) vs 95.5kg (2/28) vs. 80.7kg (2/25) vs adm wt of 95.5kg--?bed-scale inaccuracy vs. wt loss--will monitor wt trends closely   - %wt change  - BMI: 27.8  - IBW: 184#      Pertinent Lab Data: (3/2): H/H 10.6/35.3, K+ 3.4, Gluc 107, POCT 177     Pertinent Meds: aspirin, heparin, abx, maalox, insulin, lipitor, digoxin, metoprolol, protonix, tamsulosin, zofran      Physical Findings:  - Appearance: confused at times; no edema noted   - GI function: LBM 2/29  - Oral/Mouth cavity: denies difficulty swallowing/chewing  - Skin: stage 2 pressure injury to sacral spine     Nutrition Requirements  Weight Used: lowest wt: 81kg; needs continued from RD note on 2/27     Estimated Energy Needs: 4248-1221 gm/day (MSJ x 1.2-1.5 AF)--increased needs d/t PCM and wound healing   Estimated Protein Needs:  gm/day (1.2-1.5 gm/kg CBW)--same as mentioned above   Estimated Fluid Needs: 1 ml/kcal     Nutrient Intake: not meeting kcal/pro needs at this time      Previous Nutrition Diagnosis: Inadequate Oral Intake and Moderate Protein-Calorie Malnutrition   (both ongoing)      Nutrition Intervention: meals and snacks, medical food supplements    Recommendations:  1. D/C Ensure Compact BID   2. F/u regarding removal of gluten-gliadin restriction      Goal/Expected Outcome: Pt to consume >75% po intake of meals, snacks, and supplements within 3 days     Indicator/Monitoring: RD to monitor diet order, energy intake, body composition, glucose/electrolyte profile, nutrition focused physical findings.
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
## 2020-03-03 LAB
ALBUMIN SERPL ELPH-MCNC: 2.6 G/DL — LOW (ref 3.5–5.2)
ALP SERPL-CCNC: 188 U/L — HIGH (ref 30–115)
ALT FLD-CCNC: 26 U/L — SIGNIFICANT CHANGE UP (ref 0–41)
ANION GAP SERPL CALC-SCNC: 12 MMOL/L — SIGNIFICANT CHANGE UP (ref 7–14)
AST SERPL-CCNC: 52 U/L — HIGH (ref 0–41)
BASOPHILS # BLD AUTO: 0.04 K/UL — SIGNIFICANT CHANGE UP (ref 0–0.2)
BASOPHILS NFR BLD AUTO: 0.5 % — SIGNIFICANT CHANGE UP (ref 0–1)
BILIRUB SERPL-MCNC: 1 MG/DL — SIGNIFICANT CHANGE UP (ref 0.2–1.2)
BUN SERPL-MCNC: 12 MG/DL — SIGNIFICANT CHANGE UP (ref 10–20)
CALCIUM SERPL-MCNC: 8 MG/DL — LOW (ref 8.5–10.1)
CHLORIDE SERPL-SCNC: 101 MMOL/L — SIGNIFICANT CHANGE UP (ref 98–110)
CO2 SERPL-SCNC: 27 MMOL/L — SIGNIFICANT CHANGE UP (ref 17–32)
CREAT SERPL-MCNC: 0.5 MG/DL — LOW (ref 0.7–1.5)
EOSINOPHIL # BLD AUTO: 0.32 K/UL — SIGNIFICANT CHANGE UP (ref 0–0.7)
EOSINOPHIL NFR BLD AUTO: 4.1 % — SIGNIFICANT CHANGE UP (ref 0–8)
GLUCOSE BLDC GLUCOMTR-MCNC: 145 MG/DL — HIGH (ref 70–99)
GLUCOSE BLDC GLUCOMTR-MCNC: 147 MG/DL — HIGH (ref 70–99)
GLUCOSE BLDC GLUCOMTR-MCNC: 197 MG/DL — HIGH (ref 70–99)
GLUCOSE BLDC GLUCOMTR-MCNC: 217 MG/DL — HIGH (ref 70–99)
GLUCOSE BLDC GLUCOMTR-MCNC: 347 MG/DL — HIGH (ref 70–99)
GLUCOSE SERPL-MCNC: 163 MG/DL — HIGH (ref 70–99)
HCT VFR BLD CALC: 36.1 % — LOW (ref 42–52)
HGB BLD-MCNC: 11.1 G/DL — LOW (ref 14–18)
IMM GRANULOCYTES NFR BLD AUTO: 0.5 % — HIGH (ref 0.1–0.3)
LYMPHOCYTES # BLD AUTO: 0.76 K/UL — LOW (ref 1.2–3.4)
LYMPHOCYTES # BLD AUTO: 9.8 % — LOW (ref 20.5–51.1)
MCHC RBC-ENTMCNC: 22.6 PG — LOW (ref 27–31)
MCHC RBC-ENTMCNC: 30.7 G/DL — LOW (ref 32–37)
MCV RBC AUTO: 73.5 FL — LOW (ref 80–94)
MONOCYTES # BLD AUTO: 0.69 K/UL — HIGH (ref 0.1–0.6)
MONOCYTES NFR BLD AUTO: 8.9 % — SIGNIFICANT CHANGE UP (ref 1.7–9.3)
NEUTROPHILS # BLD AUTO: 5.92 K/UL — SIGNIFICANT CHANGE UP (ref 1.4–6.5)
NEUTROPHILS NFR BLD AUTO: 76.2 % — HIGH (ref 42.2–75.2)
NRBC # BLD: 0 /100 WBCS — SIGNIFICANT CHANGE UP (ref 0–0)
PLATELET # BLD AUTO: 278 K/UL — SIGNIFICANT CHANGE UP (ref 130–400)
POTASSIUM SERPL-MCNC: 3.7 MMOL/L — SIGNIFICANT CHANGE UP (ref 3.5–5)
POTASSIUM SERPL-SCNC: 3.7 MMOL/L — SIGNIFICANT CHANGE UP (ref 3.5–5)
PROT SERPL-MCNC: 5.4 G/DL — LOW (ref 6–8)
RBC # BLD: 4.91 M/UL — SIGNIFICANT CHANGE UP (ref 4.7–6.1)
RBC # FLD: 24.8 % — HIGH (ref 11.5–14.5)
SODIUM SERPL-SCNC: 140 MMOL/L — SIGNIFICANT CHANGE UP (ref 135–146)
WBC # BLD: 7.77 K/UL — SIGNIFICANT CHANGE UP (ref 4.8–10.8)
WBC # FLD AUTO: 7.77 K/UL — SIGNIFICANT CHANGE UP (ref 4.8–10.8)

## 2020-03-03 PROCEDURE — 99233 SBSQ HOSP IP/OBS HIGH 50: CPT

## 2020-03-03 RX ORDER — NYSTATIN CREAM 100000 [USP'U]/G
1 CREAM TOPICAL EVERY 8 HOURS
Refills: 0 | Status: DISCONTINUED | OUTPATIENT
Start: 2020-03-03 | End: 2020-03-19

## 2020-03-03 RX ADMIN — TAMSULOSIN HYDROCHLORIDE 0.4 MILLIGRAM(S): 0.4 CAPSULE ORAL at 21:04

## 2020-03-03 RX ADMIN — Medication 1 APPLICATION(S): at 17:22

## 2020-03-03 RX ADMIN — HEPARIN SODIUM 5000 UNIT(S): 5000 INJECTION INTRAVENOUS; SUBCUTANEOUS at 15:00

## 2020-03-03 RX ADMIN — Medication 6 UNIT(S): at 09:37

## 2020-03-03 RX ADMIN — Medication 81 MILLIGRAM(S): at 11:30

## 2020-03-03 RX ADMIN — Medication 50 MILLIGRAM(S): at 05:35

## 2020-03-03 RX ADMIN — ATORVASTATIN CALCIUM 40 MILLIGRAM(S): 80 TABLET, FILM COATED ORAL at 11:29

## 2020-03-03 RX ADMIN — Medication 8: at 17:22

## 2020-03-03 RX ADMIN — CHLORHEXIDINE GLUCONATE 1 APPLICATION(S): 213 SOLUTION TOPICAL at 05:33

## 2020-03-03 RX ADMIN — Medication 1 APPLICATION(S): at 05:34

## 2020-03-03 RX ADMIN — INSULIN GLARGINE 20 UNIT(S): 100 INJECTION, SOLUTION SUBCUTANEOUS at 21:01

## 2020-03-03 RX ADMIN — HEPARIN SODIUM 5000 UNIT(S): 5000 INJECTION INTRAVENOUS; SUBCUTANEOUS at 05:34

## 2020-03-03 RX ADMIN — PANTOPRAZOLE SODIUM 40 MILLIGRAM(S): 20 TABLET, DELAYED RELEASE ORAL at 21:04

## 2020-03-03 RX ADMIN — Medication 6 UNIT(S): at 11:24

## 2020-03-03 RX ADMIN — NYSTATIN CREAM 1 APPLICATION(S): 100000 CREAM TOPICAL at 21:04

## 2020-03-03 RX ADMIN — PRASUGREL 10 MILLIGRAM(S): 5 TABLET, FILM COATED ORAL at 11:30

## 2020-03-03 RX ADMIN — Medication 6 UNIT(S): at 17:22

## 2020-03-03 RX ADMIN — DULOXETINE HYDROCHLORIDE 90 MILLIGRAM(S): 30 CAPSULE, DELAYED RELEASE ORAL at 11:29

## 2020-03-03 RX ADMIN — MEROPENEM 100 MILLIGRAM(S): 1 INJECTION INTRAVENOUS at 05:29

## 2020-03-03 RX ADMIN — Medication 0.12 MILLIGRAM(S): at 05:33

## 2020-03-03 RX ADMIN — NYSTATIN CREAM 1 APPLICATION(S): 100000 CREAM TOPICAL at 15:00

## 2020-03-03 RX ADMIN — Medication 100 MILLIGRAM(S): at 05:29

## 2020-03-03 RX ADMIN — HEPARIN SODIUM 5000 UNIT(S): 5000 INJECTION INTRAVENOUS; SUBCUTANEOUS at 21:04

## 2020-03-03 RX ADMIN — Medication 4: at 11:25

## 2020-03-03 NOTE — PROGRESS NOTE ADULT - SUBJECTIVE AND OBJECTIVE BOX
FLOWER MARISCAL  64y, Male    All available historical data reviewed    OVERNIGHT EVENTS:    none, feels well  ROS:  General: Denies rigors, night sweats  HEENT: Denies headache, rhinorrhea, sore throat, eye pain  CV: Denies CP, palpitations  PULM: Denies wheezing, hemoptysis  GI: Denies hematemesis, hematochezia, melena  : Denies discharge, hematuria  MSK: Denies arthralgias, myalgias  SKIN: Denies rash, lesions  NEURO: Denies paresthesias, weakness  PSYCH: Denies depression, anxiety    VITALS:  T(F): 96.7, Max: 97.4 (03-02-20 @ 12:44)  HR: 97  BP: 125/76  RR: 18Vital Signs Last 24 Hrs  T(C): 35.9 (03 Mar 2020 05:29), Max: 36.3 (02 Mar 2020 12:44)  T(F): 96.7 (03 Mar 2020 05:29), Max: 97.4 (02 Mar 2020 12:44)  HR: 97 (03 Mar 2020 05:29) (94 - 97)  BP: 125/76 (03 Mar 2020 05:29) (105/64 - 125/76)  BP(mean): --  RR: 18 (03 Mar 2020 05:29) (18 - 18)  SpO2: --    TESTS & MEASUREMENTS:                        11.1   7.77  )-----------( 278      ( 03 Mar 2020 05:42 )             36.1     03-03    140  |  101  |  12  ----------------------------<  163<H>  3.7   |  27  |  0.5<L>    Ca    8.0<L>      03 Mar 2020 05:42  Mg     1.8     03-02    TPro  5.4<L>  /  Alb  2.6<L>  /  TBili  1.0  /  DBili  x   /  AST  52<H>  /  ALT  26  /  AlkPhos  188<H>  03-03    LIVER FUNCTIONS - ( 03 Mar 2020 05:42 )  Alb: 2.6 g/dL / Pro: 5.4 g/dL / ALK PHOS: 188 U/L / ALT: 26 U/L / AST: 52 U/L / GGT: x             Culture - Fungal, Body Fluid (collected 02-28-20 @ 14:15)  Source: .Body Fluid None  Preliminary Report (03-02-20 @ 10:46):    Testing in progress    Culture - Body Fluid with Gram Stain (collected 02-28-20 @ 14:15)  Source: .Body Fluid None  Gram Stain (02-29-20 @ 06:57):    No polymorphonuclear cells seen    No organisms seen    by cytocentrifuge  Preliminary Report (03-01-20 @ 08:48):    No growth    Culture - Blood (collected 02-28-20 @ 07:02)  Source: .Blood None  Preliminary Report (02-29-20 @ 14:01):    No growth to date.    Culture - Blood (collected 02-28-20 @ 03:43)  Source: .Blood Blood  Preliminary Report (02-29-20 @ 14:01):    No growth to date.            RADIOLOGY & ADDITIONAL TESTS:  Personal review of radiological diagnostics performed  Echo and EKG results noted when applicable.     MEDICATIONS:  acetaminophen   Tablet .. 650 milliGRAM(s) Oral every 6 hours PRN  aluminum hydroxide/magnesium hydroxide/simethicone Suspension 30 milliLiter(s) Oral every 6 hours PRN  aspirin enteric coated 81 milliGRAM(s) Oral daily  atorvastatin Oral Tab/Cap - Peds 40 milliGRAM(s) Oral daily  BACItracin   Ointment 1 Application(s) Topical two times a day  chlorhexidine 4% Liquid 1 Application(s) Topical <User Schedule>  dextrose 40% Gel 15 Gram(s) Oral once PRN  dextrose 5%. 1000 milliLiter(s) IV Continuous <Continuous>  dextrose 50% Injectable 12.5 Gram(s) IV Push once  dextrose 50% Injectable 25 Gram(s) IV Push once  dextrose 50% Injectable 25 Gram(s) IV Push once  digoxin     Tablet 0.125 milliGRAM(s) Oral daily  DULoxetine 90 milliGRAM(s) Oral daily  glucagon  Injectable 1 milliGRAM(s) IntraMuscular once PRN  heparin  Injectable 5000 Unit(s) SubCutaneous every 8 hours  insulin glargine Injectable (LANTUS) 20 Unit(s) SubCutaneous at bedtime  insulin lispro (HumaLOG) corrective regimen sliding scale   SubCutaneous three times a day before meals  insulin lispro Injectable (HumaLOG) 6 Unit(s) SubCutaneous three times a day before meals  meropenem  IVPB 1000 milliGRAM(s) IV Intermittent every 8 hours  metoprolol succinate ER 50 milliGRAM(s) Oral daily  metroNIDAZOLE  IVPB      metroNIDAZOLE  IVPB 500 milliGRAM(s) IV Intermittent every 8 hours  ondansetron Injectable 4 milliGRAM(s) IV Push once PRN  pantoprazole    Tablet 40 milliGRAM(s) Oral at bedtime  prasugrel 10 milliGRAM(s) Oral daily  tamsulosin Oral Tab/Cap - Peds 0.4 milliGRAM(s) Oral at bedtime      ANTIBIOTICS:  meropenem  IVPB 1000 milliGRAM(s) IV Intermittent every 8 hours  metroNIDAZOLE  IVPB      metroNIDAZOLE  IVPB 500 milliGRAM(s) IV Intermittent every 8 hours
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
## 2020-03-03 NOTE — PROGRESS NOTE ADULT - SUBJECTIVE AND OBJECTIVE BOX
FLOWER MARISCAL  64y  Male      Patient is a 64y old  Male who presents with a chief complaint of Altered mental status (03 Mar 2020 10:29)      INTERVAL HPI/OVERNIGHT EVENTS: feeling stronger. mentation at baseline. no abdominal pain. willing to work with PT      REVIEW OF SYSTEMS:  as above  All other review of systems negative    T(C): 36.2 (03-03-20 @ 12:11), Max: 36.3 (03-02-20 @ 12:44)  HR: 97 (03-03-20 @ 12:11) (94 - 97)  BP: 120/70 (03-03-20 @ 12:11) (105/64 - 125/76)  RR: 18 (03-03-20 @ 12:11) (18 - 18)  SpO2: --  Wt(kg): --Vital Signs Last 24 Hrs  T(C): 36.2 (03 Mar 2020 12:11), Max: 36.3 (02 Mar 2020 12:44)  T(F): 97.1 (03 Mar 2020 12:11), Max: 97.4 (02 Mar 2020 12:44)  HR: 97 (03 Mar 2020 12:11) (94 - 97)  BP: 120/70 (03 Mar 2020 12:11) (105/64 - 125/76)  BP(mean): --  RR: 18 (03 Mar 2020 12:11) (18 - 18)  SpO2: --      03-02-20 @ 07:01  -  03-03-20 @ 07:00  --------------------------------------------------------  IN: 900 mL / OUT: 750 mL / NET: 150 mL        PHYSICAL EXAM:  GENERAL: deconditioned, temporal wasting  PSYCH: no agitation, baseline mentation  NERVOUS SYSTEM:  Alert & Oriented X3, no new focal deficits  PULMONARY: Clear to percussion bilaterally; No rales, rhonchi, wheezing, or rubs  CARDIOVASCULAR: Regular rate and rhythm; No murmurs, rubs, or gallops  GI: Soft, Nontender, Nondistended; Bowel sounds present, R sided biliary drain with ongoing output, thin   no fields  EXTREMITIES:  2+ Peripheral Pulses, No clubbing, cyanosis, or edema, R hip surgical scars    Consultant(s) Notes Reviewed:  [x ] YES  [ ] NO    Discussed with Consultants/Other Providers [ x] YES     LABS                          11.1   7.77  )-----------( 278      ( 03 Mar 2020 05:42 )             36.1     03-03    140  |  101  |  12  ----------------------------<  163<H>  3.7   |  27  |  0.5<L>    Ca    8.0<L>      03 Mar 2020 05:42  Mg     1.8     03-02    TPro  5.4<L>  /  Alb  2.6<L>  /  TBili  1.0  /  DBili  x   /  AST  52<H>  /  ALT  26  /  AlkPhos  188<H>  03-03          Lactate Trend  02-29 @ 05:53 Lactate:0.9         CAPILLARY BLOOD GLUCOSE      POCT Blood Glucose.: 197 mg/dL (03 Mar 2020 11:52)        RADIOLOGY & ADDITIONAL TESTS:    Imaging Personally Reviewed:  [ ] YES  [ ] NO    HEALTH ISSUES - PROBLEM Dx:  Encephalopathy, metabolic  Delirium due to medical condition without behavioral disturbance  Delirium          #Progress Note Handoff    Pending (specify):  Consults_________, Tests________, Test Results_______, Other_________    Family discussion:    Disposition: Home___/SNF___/Other________/Unknown at this time________

## 2020-03-03 NOTE — PROGRESS NOTE ADULT - ASSESSMENT
· Assessment		  IMPRESSION:  #Resolved Acute cholecystitis with no complaints and an unremarkable PE  -HIDA 2/27 non visualization of the GB  -US/CT with GB hydrops/stones  -S/p PTC by IVR 2/28 with NG bile cultures  -BCx 2/28 NG  -WBC 24.5>14.9>7.7    RECOMMENDATIONS:  -po Avelox 400 mg q24h for 7 more days starting 3/4  -could give po Levoquin 500 mg q24h today  -recall prn please
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
## 2020-03-04 ENCOUNTER — TRANSCRIPTION ENCOUNTER (OUTPATIENT)
Age: 65
End: 2020-03-04

## 2020-03-04 LAB
ANION GAP SERPL CALC-SCNC: 10 MMOL/L — SIGNIFICANT CHANGE UP (ref 7–14)
BASOPHILS # BLD AUTO: 0.06 K/UL — SIGNIFICANT CHANGE UP (ref 0–0.2)
BASOPHILS NFR BLD AUTO: 0.6 % — SIGNIFICANT CHANGE UP (ref 0–1)
BUN SERPL-MCNC: 9 MG/DL — LOW (ref 10–20)
CALCIUM SERPL-MCNC: 8.1 MG/DL — LOW (ref 8.5–10.1)
CHLORIDE SERPL-SCNC: 102 MMOL/L — SIGNIFICANT CHANGE UP (ref 98–110)
CO2 SERPL-SCNC: 29 MMOL/L — SIGNIFICANT CHANGE UP (ref 17–32)
CREAT SERPL-MCNC: 0.5 MG/DL — LOW (ref 0.7–1.5)
CULTURE RESULTS: SIGNIFICANT CHANGE UP
CULTURE RESULTS: SIGNIFICANT CHANGE UP
EOSINOPHIL # BLD AUTO: 0.51 K/UL — SIGNIFICANT CHANGE UP (ref 0–0.7)
EOSINOPHIL NFR BLD AUTO: 4.9 % — SIGNIFICANT CHANGE UP (ref 0–8)
GLUCOSE BLDC GLUCOMTR-MCNC: 122 MG/DL — HIGH (ref 70–99)
GLUCOSE BLDC GLUCOMTR-MCNC: 137 MG/DL — HIGH (ref 70–99)
GLUCOSE BLDC GLUCOMTR-MCNC: 178 MG/DL — HIGH (ref 70–99)
GLUCOSE BLDC GLUCOMTR-MCNC: 185 MG/DL — HIGH (ref 70–99)
GLUCOSE SERPL-MCNC: 131 MG/DL — HIGH (ref 70–99)
HCT VFR BLD CALC: 36.3 % — LOW (ref 42–52)
HGB BLD-MCNC: 11.2 G/DL — LOW (ref 14–18)
IMM GRANULOCYTES NFR BLD AUTO: 0.6 % — HIGH (ref 0.1–0.3)
LYMPHOCYTES # BLD AUTO: 1.02 K/UL — LOW (ref 1.2–3.4)
LYMPHOCYTES # BLD AUTO: 9.9 % — LOW (ref 20.5–51.1)
MCHC RBC-ENTMCNC: 22.6 PG — LOW (ref 27–31)
MCHC RBC-ENTMCNC: 30.9 G/DL — LOW (ref 32–37)
MCV RBC AUTO: 73.3 FL — LOW (ref 80–94)
MONOCYTES # BLD AUTO: 1.03 K/UL — HIGH (ref 0.1–0.6)
MONOCYTES NFR BLD AUTO: 10 % — HIGH (ref 1.7–9.3)
NEUTROPHILS # BLD AUTO: 7.67 K/UL — HIGH (ref 1.4–6.5)
NEUTROPHILS NFR BLD AUTO: 74 % — SIGNIFICANT CHANGE UP (ref 42.2–75.2)
NRBC # BLD: 0 /100 WBCS — SIGNIFICANT CHANGE UP (ref 0–0)
PLATELET # BLD AUTO: 290 K/UL — SIGNIFICANT CHANGE UP (ref 130–400)
POTASSIUM SERPL-MCNC: 3.8 MMOL/L — SIGNIFICANT CHANGE UP (ref 3.5–5)
POTASSIUM SERPL-SCNC: 3.8 MMOL/L — SIGNIFICANT CHANGE UP (ref 3.5–5)
RBC # BLD: 4.95 M/UL — SIGNIFICANT CHANGE UP (ref 4.7–6.1)
RBC # FLD: 24.7 % — HIGH (ref 11.5–14.5)
SODIUM SERPL-SCNC: 141 MMOL/L — SIGNIFICANT CHANGE UP (ref 135–146)
SPECIMEN SOURCE: SIGNIFICANT CHANGE UP
SPECIMEN SOURCE: SIGNIFICANT CHANGE UP
WBC # BLD: 10.35 K/UL — SIGNIFICANT CHANGE UP (ref 4.8–10.8)
WBC # FLD AUTO: 10.35 K/UL — SIGNIFICANT CHANGE UP (ref 4.8–10.8)

## 2020-03-04 PROCEDURE — 99232 SBSQ HOSP IP/OBS MODERATE 35: CPT

## 2020-03-04 RX ORDER — MOXIFLOXACIN HYDROCHLORIDE TABLETS, 400 MG 400 MG/1
1 TABLET, FILM COATED ORAL
Qty: 7 | Refills: 0
Start: 2020-03-04 | End: 2020-03-10

## 2020-03-04 RX ORDER — DIGOXIN 250 MCG
1 TABLET ORAL
Qty: 0 | Refills: 0 | DISCHARGE

## 2020-03-04 RX ORDER — MIDODRINE HYDROCHLORIDE 2.5 MG/1
2.5 TABLET ORAL EVERY 8 HOURS
Refills: 0 | Status: DISCONTINUED | OUTPATIENT
Start: 2020-03-04 | End: 2020-03-06

## 2020-03-04 RX ORDER — DIGOXIN 250 MCG
1 TABLET ORAL
Qty: 0 | Refills: 0 | DISCHARGE
Start: 2020-03-04

## 2020-03-04 RX ADMIN — MIDODRINE HYDROCHLORIDE 2.5 MILLIGRAM(S): 2.5 TABLET ORAL at 14:48

## 2020-03-04 RX ADMIN — Medication 2: at 16:44

## 2020-03-04 RX ADMIN — Medication 1 APPLICATION(S): at 06:16

## 2020-03-04 RX ADMIN — HEPARIN SODIUM 5000 UNIT(S): 5000 INJECTION INTRAVENOUS; SUBCUTANEOUS at 14:49

## 2020-03-04 RX ADMIN — ATORVASTATIN CALCIUM 40 MILLIGRAM(S): 80 TABLET, FILM COATED ORAL at 11:04

## 2020-03-04 RX ADMIN — Medication 6 UNIT(S): at 16:44

## 2020-03-04 RX ADMIN — HEPARIN SODIUM 5000 UNIT(S): 5000 INJECTION INTRAVENOUS; SUBCUTANEOUS at 06:16

## 2020-03-04 RX ADMIN — PANTOPRAZOLE SODIUM 40 MILLIGRAM(S): 20 TABLET, DELAYED RELEASE ORAL at 21:58

## 2020-03-04 RX ADMIN — Medication 0.12 MILLIGRAM(S): at 06:16

## 2020-03-04 RX ADMIN — MIDODRINE HYDROCHLORIDE 2.5 MILLIGRAM(S): 2.5 TABLET ORAL at 21:58

## 2020-03-04 RX ADMIN — HEPARIN SODIUM 5000 UNIT(S): 5000 INJECTION INTRAVENOUS; SUBCUTANEOUS at 21:58

## 2020-03-04 RX ADMIN — NYSTATIN CREAM 1 APPLICATION(S): 100000 CREAM TOPICAL at 14:49

## 2020-03-04 RX ADMIN — Medication 6 UNIT(S): at 07:39

## 2020-03-04 RX ADMIN — DULOXETINE HYDROCHLORIDE 90 MILLIGRAM(S): 30 CAPSULE, DELAYED RELEASE ORAL at 11:02

## 2020-03-04 RX ADMIN — Medication 50 MILLIGRAM(S): at 06:17

## 2020-03-04 RX ADMIN — PRASUGREL 10 MILLIGRAM(S): 5 TABLET, FILM COATED ORAL at 11:02

## 2020-03-04 RX ADMIN — INSULIN GLARGINE 20 UNIT(S): 100 INJECTION, SOLUTION SUBCUTANEOUS at 21:58

## 2020-03-04 RX ADMIN — Medication 6 UNIT(S): at 11:54

## 2020-03-04 RX ADMIN — NYSTATIN CREAM 1 APPLICATION(S): 100000 CREAM TOPICAL at 06:15

## 2020-03-04 RX ADMIN — TAMSULOSIN HYDROCHLORIDE 0.4 MILLIGRAM(S): 0.4 CAPSULE ORAL at 21:58

## 2020-03-04 RX ADMIN — NYSTATIN CREAM 1 APPLICATION(S): 100000 CREAM TOPICAL at 21:57

## 2020-03-04 RX ADMIN — Medication 81 MILLIGRAM(S): at 11:03

## 2020-03-04 NOTE — DISCHARGE NOTE PROVIDER - PROVIDER TOKENS
PROVIDER:[TOKEN:[[id number]]],PROVIDER:[TOKEN:[[id number]]],PROVIDER:[TOKEN:[[id number]]],PROVIDER:[TOKEN:[[id number]]] PROVIDER:[TOKEN:[[id number]],FOLLOWUP:[2 weeks]],PROVIDER:[TOKEN:[[id number]],FOLLOWUP:[1 month]],PROVIDER:[TOKEN:[[id number]],FOLLOWUP:[2 weeks]],PROVIDER:[TOKEN:[[id number]],FOLLOWUP:[2 weeks]]
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
## 2020-03-04 NOTE — PROGRESS NOTE ADULT - SUBJECTIVE AND OBJECTIVE BOX
FLOWER MARISCAL  64y  Male      Patient is a 64y old  Male who presents with a chief complaint of Altered mental status (04 Mar 2020 08:30)      INTERVAL HPI/OVERNIGHT EVENTS:      ******************************* REVIEW OF SYSTEMS:**********************************************    resting in bed.   All other review of systems negative    *********************** VITALS ******************************************    T(F): 97.2 (03-04-20 @ 05:23)  HR: 89 (03-04-20 @ 05:23) (88 - 89)  BP: 116/71 (03-04-20 @ 05:23) (105/55 - 116/71)  RR: 18 (03-04-20 @ 05:23) (18 - 18)  SpO2: --    03-03-20 @ 07:01  -  03-04-20 @ 07:00  --------------------------------------------------------  IN: 400 mL / OUT: 1830 mL / NET: -1430 mL    03-04-20 @ 07:01  -  03-04-20 @ 13:06  --------------------------------------------------------  IN: 180 mL / OUT: 700 mL / NET: -520 mL            03-03-20 @ 07:01  -  03-04-20 @ 07:00  --------------------------------------------------------  IN: 400 mL / OUT: 1830 mL / NET: -1430 mL    03-04-20 @ 07:01  -  03-04-20 @ 13:06  --------------------------------------------------------  IN: 180 mL / OUT: 700 mL / NET: -520 mL        ******************************** PHYSICAL EXAM:**************************************************  GENERAL: NAD    PSYCH: no agitation, baseline mentation  HEENT:     NERVOUS SYSTEM:  Alert & Oriented x3    PULMONARY: CRYSTAL, CTA    CARDIOVASCULAR: S1S2 RRR    GI: Soft, NT, ND; BS present.    EXTREMITIES:  2+ Peripheral Pulses, No clubbing, cyanosis, or edema    LYMPH: No lymphadenopathy noted    SKIN: No rashes or lesions    ******************************************************************************************        **************************** LABS *******************************************************                          11.2   10.35 )-----------( 290      ( 04 Mar 2020 08:07 )             36.3     03-04    141  |  102  |  9<L>  ----------------------------<  131<H>  3.8   |  29  |  0.5<L>    Ca    8.1<L>      04 Mar 2020 08:07    TPro  5.4<L>  /  Alb  2.6<L>  /  TBili  1.0  /  DBili  x   /  AST  52<H>  /  ALT  26  /  AlkPhos  188<H>  03-03          Lactate Trend  02-29 @ 05:53 Lactate:0.9         CAPILLARY BLOOD GLUCOSE      POCT Blood Glucose.: 122 mg/dL (04 Mar 2020 11:42)          **************************Active Medications *******************************************  ACE inhibitors (Hives)  enalapril (Hives)      acetaminophen   Tablet .. 650 milliGRAM(s) Oral every 6 hours PRN  aluminum hydroxide/magnesium hydroxide/simethicone Suspension 30 milliLiter(s) Oral every 6 hours PRN  aspirin enteric coated 81 milliGRAM(s) Oral daily  atorvastatin Oral Tab/Cap - Peds 40 milliGRAM(s) Oral daily  BACItracin   Ointment 1 Application(s) Topical two times a day  chlorhexidine 4% Liquid 1 Application(s) Topical <User Schedule>  dextrose 40% Gel 15 Gram(s) Oral once PRN  dextrose 5%. 1000 milliLiter(s) IV Continuous <Continuous>  dextrose 50% Injectable 12.5 Gram(s) IV Push once  dextrose 50% Injectable 25 Gram(s) IV Push once  dextrose 50% Injectable 25 Gram(s) IV Push once  digoxin     Tablet 0.125 milliGRAM(s) Oral daily  DULoxetine 90 milliGRAM(s) Oral daily  glucagon  Injectable 1 milliGRAM(s) IntraMuscular once PRN  heparin  Injectable 5000 Unit(s) SubCutaneous every 8 hours  insulin glargine Injectable (LANTUS) 20 Unit(s) SubCutaneous at bedtime  insulin lispro (HumaLOG) corrective regimen sliding scale   SubCutaneous three times a day before meals  insulin lispro Injectable (HumaLOG) 6 Unit(s) SubCutaneous three times a day before meals  levoFLOXacin  Tablet 500 milliGRAM(s) Oral daily  metoprolol succinate ER 50 milliGRAM(s) Oral daily  nystatin Powder 1 Application(s) Topical every 8 hours  ondansetron Injectable 4 milliGRAM(s) IV Push once PRN  pantoprazole    Tablet 40 milliGRAM(s) Oral at bedtime  prasugrel 10 milliGRAM(s) Oral daily  tamsulosin Oral Tab/Cap - Peds 0.4 milliGRAM(s) Oral at bedtime      ***************************************************  RADIOLOGY & ADDITIONAL TESTS:    Imaging Personally Reviewed:  [ ] YES  [ ] NO    HEALTH ISSUES - PROBLEM Dx:  Encephalopathy, metabolic  Delirium due to medical condition without behavioral disturbance  Delirium

## 2020-03-04 NOTE — DISCHARGE NOTE PROVIDER - NSDCCPCAREPLAN_GEN_ALL_CORE_FT
PRINCIPAL DISCHARGE DIAGNOSIS  Diagnosis: Sepsis  Assessment and Plan of Treatment: Sepsis is a serious condition that occurs when the body overreacts to an infection.You were treated in the hospital with IV antibiotics, and your symptoms resolved.   Please seek immediate medical attention if you develop fever >100.4 F, feel dizzy, have nausea or vomiting, coughing blood, have sudden trouble breathing or chest pain, severe weakness, or your skin or lips are turning blue.        SECONDARY DISCHARGE DIAGNOSES  Diagnosis: Acute cholecystitis  Assessment and Plan of Treatment: You were found to have cholecystitis which is an infection of the gallbladder. You underwent a procedure where a drain was put in place. You have been treated with IV antibiotics. This drain will need to stay in place for at least 6 weeks. Please follow up with your primary care doctor and surgeon for further management outpatient. You will need repeat hepatic panel testing to check your liver enzymes.    Diagnosis: MOISES (acute kidney injury)  Assessment and Plan of Treatment: You were noted to have a temporary insult to your kidney function either at the time that you arrived at the hospital or during your stay here. We have monitored your kidney function with blood work during your time here and you are at a level that no longer requires continued hospital level care, but we do recommend that you follow up to continually have your kidney function checked. You can follow up with your primary care doctor in 1 week.    Diagnosis: Metabolic encephalopathy  Assessment and Plan of Treatment: You presented with confusion secondary to a infection.  You were treated with antibiotics and your confusion resolved. Please follow up with your primary care doctor in 1-2 weeks.    Diagnosis: UTI (urinary tract infection)  Assessment and Plan of Treatment: You were noted either on arrival or during this hospitalization to have a Urinary Tract Infection. You may have already been treated and completed the antibiotics. Please follow up with your PCP in 1-2 weeks. PRINCIPAL DISCHARGE DIAGNOSIS  Diagnosis: Sepsis  Assessment and Plan of Treatment: Sepsis is a serious condition that occurs when the body overreacts to an infection.You were treated in the hospital with IV antibiotics, and your symptoms resolved.   Please seek immediate medical attention if you develop fever >100.4 F, feel dizzy, have nausea or vomiting, coughing blood, have sudden trouble breathing or chest pain, severe weakness, or your skin or lips are turning blue.        SECONDARY DISCHARGE DIAGNOSES  Diagnosis: Acute cholecystitis  Assessment and Plan of Treatment: You were found to have cholecystitis which is an infection of the gallbladder. You underwent a procedure where a drain was put in place. You have been treated with IV antibiotics. This drain will need to stay in place for at least 6 weeks. Please follow up with your primary care doctor and surgeon for further management outpatient. You will need repeat hepatic panel testing to check your liver enzymes.    Diagnosis: MOISES (acute kidney injury)  Assessment and Plan of Treatment: You were noted to have a temporary insult to your kidney function either at the time that you arrived at the hospital or during your stay here. We have monitored your kidney function with blood work during your time here and you are at a level that no longer requires continued hospital level care, but we do recommend that you follow up to continually have your kidney function checked. You can follow up with your primary care doctor in 1 week.    Diagnosis: Metabolic encephalopathy  Assessment and Plan of Treatment: You presented with confusion secondary to a infection.  You were treated with antibiotics and your confusion resolved. Please follow up with your primary care doctor in 1-2 weeks.    Diagnosis: UTI (urinary tract infection)  Assessment and Plan of Treatment: You were noted either on arrival or during this hospitalization to have a Urinary Tract Infection. You may have already been treated and completed the antibiotics. Please follow up with your PCP in 1-2 weeks.      Diagnosis: Chronic CHF  Assessment and Plan of Treatment: You have a history of congestive heart failure is a chronic condition in which the heart has trouble pumping blood. Your home medications were temporarily held due to low blood pressure. You have been restarted on half your dose of home lasix (20 mg daily). You can resume your full dose once your blood pressures improve. You can resume your metolazone once your blood pressure improves.    Diagnosis: Pressure ulcer  Assessment and Plan of Treatment: You have a pressure ulcer on the left 2nd toe. Foot xray shows possible involvement of the underlying bone. Please follow up with your podiatrist for further management in 1 week. You may need additional testing and/or treatment.

## 2020-03-04 NOTE — DISCHARGE NOTE PROVIDER - NSDCMRMEDTOKEN_GEN_ALL_CORE_FT
aspirin 81 mg oral delayed release tablet: 1 tab(s) orally once a day  atorvastatin 40 mg oral tablet: 1 tab(s) orally once a day  Avelox 400 mg oral tablet: 1 tab(s) orally once a day   digoxin 125 mcg (0.125 mg) oral tablet: 1 tab(s) orally once a day  DULoxetine 30 mg oral delayed release capsule: 3 cap(s) orally once a day  furosemide 40 mg oral tablet: 1 tab(s) orally once a day  Jardiance:   metOLazone 2.5 mg oral tablet: 1 tab(s) orally once a day, 30 min before lasix  Metoprolol Succinate ER 50 mg oral tablet, extended release: 3 tab(s) orally once a day  pantoprazole 40 mg oral delayed release tablet: 1 tab(s) orally once a day  prasugrel 10 mg oral tablet: 1 tab(s) orally once a day  tamsulosin 0.4 mg oral capsule: 1 cap(s) orally once a day (at bedtime)  Trulicity Pen: subcutaneous once a week  Zetia 10 mg oral tablet: 1 tab(s) orally once a day (at bedtime) aspirin 81 mg oral delayed release tablet: 1 tab(s) orally once a day  atorvastatin 40 mg oral tablet: 1 tab(s) orally once a day  digoxin 125 mcg (0.125 mg) oral tablet: 1 tab(s) orally once a day  DULoxetine 30 mg oral delayed release capsule: 3 cap(s) orally once a day  Jardiance:   Metoprolol Succinate ER 50 mg oral tablet, extended release: 3 tab(s) orally once a day  pantoprazole 40 mg oral delayed release tablet: 1 tab(s) orally once a day  prasugrel 10 mg oral tablet: 1 tab(s) orally once a day  tamsulosin 0.4 mg oral capsule: 1 cap(s) orally once a day (at bedtime)  Trulicity Pen: subcutaneous once a week alcohol swabs : Apply topically to affected area 4 times a day   aspirin 81 mg oral delayed release tablet: 1 tab(s) orally once a day  atorvastatin 40 mg oral tablet: 1 tab(s) orally once a day  digoxin 125 mcg (0.125 mg) oral tablet: 1 tab(s) orally once a day  DULoxetine 30 mg oral delayed release capsule: 3 cap(s) orally once a day  glucometer (per patient&#x27;s insurance): Test blood sugars four times a day. Dispense #1 glucometer.  Jardiance:   lancets: 1 application subcutaneously 4 times a day   Metoprolol Succinate ER 50 mg oral tablet, extended release: 3 tab(s) orally once a day  pantoprazole 40 mg oral delayed release tablet: 1 tab(s) orally once a day  prasugrel 10 mg oral tablet: 1 tab(s) orally once a day  tamsulosin 0.4 mg oral capsule: 1 cap(s) orally once a day (at bedtime)  test strips (per patient&#x27;s insurance): 1 application subcutaneously 4 times a day. ** Compatible with patient&#x27;s glucometer **  Trulicity Pen: subcutaneous once a week alcohol swabs : Apply topically to affected area 4 times a day   aspirin 81 mg oral delayed release tablet: 1 tab(s) orally once a day  atorvastatin 40 mg oral tablet: 1 tab(s) orally once a day  collagenase 250 units/g topical ointment: 1 application topically once a day  digoxin 125 mcg (0.125 mg) oral tablet: 1 tab(s) orally once a day  DULoxetine 30 mg oral delayed release capsule: 3 cap(s) orally once a day  glucometer (per patient&#x27;s insurance): Test blood sugars four times a day. Dispense #1 glucometer.  HumaLOG KwikPen 200 units/mL (Concentrated) subcutaneous solution: 6 unit(s) subcutaneous 3 times a day (before meals)  insulin glargine: 20 unit(s) subcutaneous once a day (at bedtime)  insulin lispro (concentrated) 200 units/mL subcutaneous solution: Sliding scale for additional coverage:    2 Unit(s) if Glucose 151 - 200  4 Unit(s) if Glucose 201 - 250  6 Unit(s) if Glucose 251 - 300  8 Unit(s) if Glucose 301 - 350  10 Unit(s) if Glucose 351 - 400  12 Unit(s) if Glucose Greater Than 400  lancets: 1 application subcutaneously 4 times a day   Lasix 20 mg oral tablet: 1 tab(s) orally once a day  Metoprolol Succinate ER 50 mg oral tablet, extended release: 3 tab(s) orally once a day  pantoprazole 40 mg oral delayed release tablet: 1 tab(s) orally once a day  prasugrel 10 mg oral tablet: 1 tab(s) orally once a day  silver sulfADIAZINE 1% topical cream: 1 application topically 2 times a day  tamsulosin 0.4 mg oral capsule: 1 cap(s) orally once a day (at bedtime)  test strips (per patient&#x27;s insurance): 1 application subcutaneously 4 times a day. ** Compatible with patient&#x27;s glucometer ** alcohol swabs : Apply topically to affected area 4 times a day   aspirin 81 mg oral delayed release tablet: 1 tab(s) orally once a day  atorvastatin 40 mg oral tablet: 1 tab(s) orally once a day  collagenase 250 units/g topical ointment: 1 application topically once a day  digoxin 125 mcg (0.125 mg) oral tablet: 1 tab(s) orally once a day  DULoxetine 30 mg oral delayed release capsule: 3 cap(s) orally once a day  glucometer (per patient&#x27;s insurance): Test blood sugars four times a day. Dispense #1 glucometer.  HumaLOG KwikPen 200 units/mL (Concentrated) subcutaneous solution: 6 unit(s) subcutaneous 3 times a day (before meals)  insulin glargine: 20 unit(s) subcutaneous once a day (at bedtime)  insulin lispro (concentrated) 200 units/mL subcutaneous solution: Sliding scale for additional coverage:    2 Unit(s) if Glucose 151 - 200  4 Unit(s) if Glucose 201 - 250  6 Unit(s) if Glucose 251 - 300  8 Unit(s) if Glucose 301 - 350  10 Unit(s) if Glucose 351 - 400  12 Unit(s) if Glucose Greater Than 400  lancets: 1 application subcutaneously 4 times a day   Lasix 20 mg oral tablet: 1 tab(s) orally once a day  Metoprolol Succinate ER 50 mg oral tablet, extended release: 1 tab(s) orally once a day  pantoprazole 40 mg oral delayed release tablet: 1 tab(s) orally once a day  prasugrel 10 mg oral tablet: 1 tab(s) orally once a day  silver sulfADIAZINE 1% topical cream: 1 application topically 2 times a day  tamsulosin 0.4 mg oral capsule: 1 cap(s) orally once a day (at bedtime)  test strips (per patient&#x27;s insurance): 1 application subcutaneously 4 times a day. ** Compatible with patient&#x27;s glucometer **

## 2020-03-04 NOTE — DISCHARGE NOTE PROVIDER - CARE PROVIDER_API CALL
Roney Turk)  Internal Medicine  [address] [place] [address]  Phone: [phone]  Fax: [phone]  Follow Up Time:     Nazia Garibay)  Surgical Physicians  [address] [facility], [facility] [address] [place] [address]  Phone: [phone]  Fax: [phone]  Follow Up Time:     Jt Hooper (KYLIE)  Surgery  [address] [place] [address]  Phone: [phone]  Fax: [phone]  Follow Up Time:     Jennifer Parrish)  Cardiology; Internal Medicine  [address] [place] [address]  Phone: [phone]  Fax: [phone]  Follow Up Time: Roney Turk)  Internal Medicine  [address] [place] [address]  Phone: [phone]  Fax: [phone]  Follow Up Time: 2 weeks    Nazia Garibay)  Surgical Physicians  [address] [facility] [address] [place] [address]  Phone: [phone]  Fax: [phone]  Follow Up Time: 1 month    Jt Hooper (KYLIE)  Surgery  [address] [place] [address]  Phone: [phone]  Fax: [phone]  Follow Up Time: 2 weeks    Jennifer Parrish)  Cardiology; Internal Medicine  [address] [place] [address]  Phone: [phone]  Fax: [phone]  Follow Up Time: 2 weeks
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
## 2020-03-04 NOTE — DISCHARGE NOTE PROVIDER - INSTRUCTIONS
Dash diet (low fat, low salt) with consistent carbs (low carbs). Dash diet (low fat, low salt) with consistent carbs (low carbs). restrict fluids to 1500 ml/day

## 2020-03-04 NOTE — DISCHARGE NOTE PROVIDER - NSDCFUSCHEDAPPT_GEN_ALL_CORE_FT
[facility] ; 04/20/2020 ; [facility] [address] [facility] ; 04/20/2020 ; [facility] Card[facility] [address] ; 04/20/2020 ; [facility] Cardio [address] [facility] ; 04/20/2020 ; [facility] Cardio [address] ; 04/20/2020 ; [facility] Cardio [address] [facility] ; 04/20/2020 ; [facility] Cardio [address] [facility] ; 04/20/2020 ; [facility] Cardio [address] [facility]

## 2020-03-04 NOTE — DISCHARGE NOTE PROVIDER - NSDCFUADDINST_GEN_ALL_CORE_FT
You have been restarted on half your dose of home lasix (20 mg daily). You can resume your full dose once your blood pressures improve. You can resume your metolazone once your blood pressure improves. You have been restarted on half your dose of home lasix (20 mg daily). You can resume your full dose once your blood pressures improve. You can resume your metolazone once your blood pressure improves.    You are being discharged the insulin regimen you were on while here.  Please follow up with your doctor upon discharge regarding resuming your previous medications.

## 2020-03-04 NOTE — DISCHARGE NOTE PROVIDER - CARE PROVIDERS DIRECT ADDRESSES
,[email or web address] [email or web address]

## 2020-03-05 LAB
ALBUMIN SERPL ELPH-MCNC: 2.5 G/DL — LOW (ref 3.5–5.2)
ALP SERPL-CCNC: 203 U/L — HIGH (ref 30–115)
ALT FLD-CCNC: 39 U/L — SIGNIFICANT CHANGE UP (ref 0–41)
ANION GAP SERPL CALC-SCNC: 12 MMOL/L — SIGNIFICANT CHANGE UP (ref 7–14)
AST SERPL-CCNC: 86 U/L — HIGH (ref 0–41)
BASOPHILS # BLD AUTO: 0.04 K/UL — SIGNIFICANT CHANGE UP (ref 0–0.2)
BASOPHILS NFR BLD AUTO: 0.4 % — SIGNIFICANT CHANGE UP (ref 0–1)
BILIRUB SERPL-MCNC: 0.8 MG/DL — SIGNIFICANT CHANGE UP (ref 0.2–1.2)
BUN SERPL-MCNC: 13 MG/DL — SIGNIFICANT CHANGE UP (ref 10–20)
CALCIUM SERPL-MCNC: 7.9 MG/DL — LOW (ref 8.5–10.1)
CHLORIDE SERPL-SCNC: 98 MMOL/L — SIGNIFICANT CHANGE UP (ref 98–110)
CO2 SERPL-SCNC: 29 MMOL/L — SIGNIFICANT CHANGE UP (ref 17–32)
CREAT SERPL-MCNC: 0.6 MG/DL — LOW (ref 0.7–1.5)
CULTURE RESULTS: SIGNIFICANT CHANGE UP
EOSINOPHIL # BLD AUTO: 0.36 K/UL — SIGNIFICANT CHANGE UP (ref 0–0.7)
EOSINOPHIL NFR BLD AUTO: 4 % — SIGNIFICANT CHANGE UP (ref 0–8)
GLUCOSE BLDC GLUCOMTR-MCNC: 158 MG/DL — HIGH (ref 70–99)
GLUCOSE BLDC GLUCOMTR-MCNC: 190 MG/DL — HIGH (ref 70–99)
GLUCOSE BLDC GLUCOMTR-MCNC: 195 MG/DL — HIGH (ref 70–99)
GLUCOSE BLDC GLUCOMTR-MCNC: 233 MG/DL — HIGH (ref 70–99)
GLUCOSE SERPL-MCNC: 203 MG/DL — HIGH (ref 70–99)
HCT VFR BLD CALC: 36.2 % — LOW (ref 42–52)
HGB BLD-MCNC: 10.8 G/DL — LOW (ref 14–18)
IMM GRANULOCYTES NFR BLD AUTO: 0.6 % — HIGH (ref 0.1–0.3)
LYMPHOCYTES # BLD AUTO: 0.96 K/UL — LOW (ref 1.2–3.4)
LYMPHOCYTES # BLD AUTO: 10.7 % — LOW (ref 20.5–51.1)
MCHC RBC-ENTMCNC: 21.9 PG — LOW (ref 27–31)
MCHC RBC-ENTMCNC: 29.8 G/DL — LOW (ref 32–37)
MCV RBC AUTO: 73.3 FL — LOW (ref 80–94)
MONOCYTES # BLD AUTO: 0.92 K/UL — HIGH (ref 0.1–0.6)
MONOCYTES NFR BLD AUTO: 10.3 % — HIGH (ref 1.7–9.3)
NEUTROPHILS # BLD AUTO: 6.61 K/UL — HIGH (ref 1.4–6.5)
NEUTROPHILS NFR BLD AUTO: 74 % — SIGNIFICANT CHANGE UP (ref 42.2–75.2)
NRBC # BLD: 0 /100 WBCS — SIGNIFICANT CHANGE UP (ref 0–0)
PLATELET # BLD AUTO: 313 K/UL — SIGNIFICANT CHANGE UP (ref 130–400)
POTASSIUM SERPL-MCNC: 3.9 MMOL/L — SIGNIFICANT CHANGE UP (ref 3.5–5)
POTASSIUM SERPL-SCNC: 3.9 MMOL/L — SIGNIFICANT CHANGE UP (ref 3.5–5)
PROT SERPL-MCNC: 5.5 G/DL — LOW (ref 6–8)
RBC # BLD: 4.94 M/UL — SIGNIFICANT CHANGE UP (ref 4.7–6.1)
RBC # FLD: 25.3 % — HIGH (ref 11.5–14.5)
SODIUM SERPL-SCNC: 139 MMOL/L — SIGNIFICANT CHANGE UP (ref 135–146)
SPECIMEN SOURCE: SIGNIFICANT CHANGE UP
WBC # BLD: 8.94 K/UL — SIGNIFICANT CHANGE UP (ref 4.8–10.8)
WBC # FLD AUTO: 8.94 K/UL — SIGNIFICANT CHANGE UP (ref 4.8–10.8)

## 2020-03-05 PROCEDURE — 99232 SBSQ HOSP IP/OBS MODERATE 35: CPT

## 2020-03-05 RX ADMIN — Medication 4: at 12:05

## 2020-03-05 RX ADMIN — NYSTATIN CREAM 1 APPLICATION(S): 100000 CREAM TOPICAL at 05:36

## 2020-03-05 RX ADMIN — CHLORHEXIDINE GLUCONATE 1 APPLICATION(S): 213 SOLUTION TOPICAL at 05:36

## 2020-03-05 RX ADMIN — ATORVASTATIN CALCIUM 40 MILLIGRAM(S): 80 TABLET, FILM COATED ORAL at 11:32

## 2020-03-05 RX ADMIN — TAMSULOSIN HYDROCHLORIDE 0.4 MILLIGRAM(S): 0.4 CAPSULE ORAL at 21:59

## 2020-03-05 RX ADMIN — MIDODRINE HYDROCHLORIDE 2.5 MILLIGRAM(S): 2.5 TABLET ORAL at 13:25

## 2020-03-05 RX ADMIN — NYSTATIN CREAM 1 APPLICATION(S): 100000 CREAM TOPICAL at 13:26

## 2020-03-05 RX ADMIN — Medication 2: at 08:22

## 2020-03-05 RX ADMIN — HEPARIN SODIUM 5000 UNIT(S): 5000 INJECTION INTRAVENOUS; SUBCUTANEOUS at 05:35

## 2020-03-05 RX ADMIN — PRASUGREL 10 MILLIGRAM(S): 5 TABLET, FILM COATED ORAL at 11:31

## 2020-03-05 RX ADMIN — Medication 1 APPLICATION(S): at 05:35

## 2020-03-05 RX ADMIN — Medication 50 MILLIGRAM(S): at 05:35

## 2020-03-05 RX ADMIN — Medication 0.12 MILLIGRAM(S): at 05:35

## 2020-03-05 RX ADMIN — PANTOPRAZOLE SODIUM 40 MILLIGRAM(S): 20 TABLET, DELAYED RELEASE ORAL at 21:59

## 2020-03-05 RX ADMIN — Medication 6 UNIT(S): at 12:06

## 2020-03-05 RX ADMIN — INSULIN GLARGINE 20 UNIT(S): 100 INJECTION, SOLUTION SUBCUTANEOUS at 22:00

## 2020-03-05 RX ADMIN — Medication 6 UNIT(S): at 17:10

## 2020-03-05 RX ADMIN — DULOXETINE HYDROCHLORIDE 90 MILLIGRAM(S): 30 CAPSULE, DELAYED RELEASE ORAL at 11:31

## 2020-03-05 RX ADMIN — Medication 81 MILLIGRAM(S): at 11:31

## 2020-03-05 RX ADMIN — Medication 1 APPLICATION(S): at 17:10

## 2020-03-05 RX ADMIN — HEPARIN SODIUM 5000 UNIT(S): 5000 INJECTION INTRAVENOUS; SUBCUTANEOUS at 21:59

## 2020-03-05 RX ADMIN — Medication 6 UNIT(S): at 08:23

## 2020-03-05 RX ADMIN — NYSTATIN CREAM 1 APPLICATION(S): 100000 CREAM TOPICAL at 21:59

## 2020-03-05 RX ADMIN — Medication 2: at 17:10

## 2020-03-05 RX ADMIN — HEPARIN SODIUM 5000 UNIT(S): 5000 INJECTION INTRAVENOUS; SUBCUTANEOUS at 13:26
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
## 2020-03-05 NOTE — PROGRESS NOTE ADULT - SUBJECTIVE AND OBJECTIVE BOX
FLOWER MRAISCAL  64y  Male      Patient is a 64y old  Male who presents with a chief complaint of Altered mental status (04 Mar 2020 13:05)      INTERVAL HPI/OVERNIGHT EVENTS:      ******************************* REVIEW OF SYSTEMS:**********************************************    resting in bed,   All other review of systems negative    *********************** VITALS ******************************************    T(F): 97.4 (03-05-20 @ 05:26)  HR: 91 (03-05-20 @ 05:26) (91 - 91)  BP: 109/67 (03-05-20 @ 05:26) (109/67 - 114/64)  RR: 18 (03-05-20 @ 05:26) (18 - 18)  SpO2: --    03-04-20 @ 07:01  -  03-05-20 @ 07:00  --------------------------------------------------------  IN: 430 mL / OUT: 1350 mL / NET: -920 mL            03-04-20 @ 07:01  -  03-05-20 @ 07:00  --------------------------------------------------------  IN: 430 mL / OUT: 1350 mL / NET: -920 mL        ******************************** PHYSICAL EXAM:**************************************************  GENERAL: NAD    PSYCH: no agitation, baseline mentation  HEENT:     NERVOUS SYSTEM:  Alert & Oriented X3,     PULMONARY: CRYSTAL, CTA    CARDIOVASCULAR: S1S2 RRR    GI: Soft, NT, ND; BS present.    EXTREMITIES:  2+ Peripheral Pulses, No clubbing, cyanosis, or edema    LYMPH: No lymphadenopathy noted    SKIN: No rashes or lesions    ******************************************************************************************      **************************** LABS *******************************************************                          10.8   8.94  )-----------( 313      ( 05 Mar 2020 05:43 )             36.2     03-05    139  |  98  |  13  ----------------------------<  203<H>  3.9   |  29  |  0.6<L>    Ca    7.9<L>      05 Mar 2020 05:43    TPro  5.5<L>  /  Alb  2.5<L>  /  TBili  0.8  /  DBili  x   /  AST  86<H>  /  ALT  39  /  AlkPhos  203<H>  03-05          Lactate Trend        CAPILLARY BLOOD GLUCOSE      POCT Blood Glucose.: 190 mg/dL (05 Mar 2020 08:06)          **************************Active Medications *******************************************  ACE inhibitors (Hives)  enalapril (Hives)      acetaminophen   Tablet .. 650 milliGRAM(s) Oral every 6 hours PRN  aluminum hydroxide/magnesium hydroxide/simethicone Suspension 30 milliLiter(s) Oral every 6 hours PRN  aspirin enteric coated 81 milliGRAM(s) Oral daily  atorvastatin Oral Tab/Cap - Peds 40 milliGRAM(s) Oral daily  BACItracin   Ointment 1 Application(s) Topical two times a day  chlorhexidine 4% Liquid 1 Application(s) Topical <User Schedule>  dextrose 40% Gel 15 Gram(s) Oral once PRN  dextrose 5%. 1000 milliLiter(s) IV Continuous <Continuous>  dextrose 50% Injectable 12.5 Gram(s) IV Push once  dextrose 50% Injectable 25 Gram(s) IV Push once  dextrose 50% Injectable 25 Gram(s) IV Push once  digoxin     Tablet 0.125 milliGRAM(s) Oral daily  DULoxetine 90 milliGRAM(s) Oral daily  glucagon  Injectable 1 milliGRAM(s) IntraMuscular once PRN  heparin  Injectable 5000 Unit(s) SubCutaneous every 8 hours  insulin glargine Injectable (LANTUS) 20 Unit(s) SubCutaneous at bedtime  insulin lispro (HumaLOG) corrective regimen sliding scale   SubCutaneous three times a day before meals  insulin lispro Injectable (HumaLOG) 6 Unit(s) SubCutaneous three times a day before meals  levoFLOXacin  Tablet 500 milliGRAM(s) Oral daily  metoprolol succinate ER 50 milliGRAM(s) Oral daily  midodrine. 2.5 milliGRAM(s) Oral every 8 hours  nystatin Powder 1 Application(s) Topical every 8 hours  ondansetron Injectable 4 milliGRAM(s) IV Push once PRN  pantoprazole    Tablet 40 milliGRAM(s) Oral at bedtime  prasugrel 10 milliGRAM(s) Oral daily  tamsulosin Oral Tab/Cap - Peds 0.4 milliGRAM(s) Oral at bedtime      ***************************************************  RADIOLOGY & ADDITIONAL TESTS:    Imaging Personally Reviewed:  [ ] YES  [ ] NO    HEALTH ISSUES - PROBLEM Dx:  Encephalopathy, metabolic  Delirium due to medical condition without behavioral disturbance  Delirium
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
## 2020-03-06 LAB
ANION GAP SERPL CALC-SCNC: 7 MMOL/L — SIGNIFICANT CHANGE UP (ref 7–14)
BASOPHILS # BLD AUTO: 0.07 K/UL — SIGNIFICANT CHANGE UP (ref 0–0.2)
BASOPHILS NFR BLD AUTO: 0.8 % — SIGNIFICANT CHANGE UP (ref 0–1)
BUN SERPL-MCNC: 14 MG/DL — SIGNIFICANT CHANGE UP (ref 10–20)
CALCIUM SERPL-MCNC: 8.3 MG/DL — LOW (ref 8.5–10.1)
CHLORIDE SERPL-SCNC: 101 MMOL/L — SIGNIFICANT CHANGE UP (ref 98–110)
CO2 SERPL-SCNC: 31 MMOL/L — SIGNIFICANT CHANGE UP (ref 17–32)
CREAT SERPL-MCNC: 0.7 MG/DL — SIGNIFICANT CHANGE UP (ref 0.7–1.5)
EOSINOPHIL # BLD AUTO: 0.32 K/UL — SIGNIFICANT CHANGE UP (ref 0–0.7)
EOSINOPHIL NFR BLD AUTO: 3.6 % — SIGNIFICANT CHANGE UP (ref 0–8)
GLUCOSE BLDC GLUCOMTR-MCNC: 107 MG/DL — HIGH (ref 70–99)
GLUCOSE BLDC GLUCOMTR-MCNC: 140 MG/DL — HIGH (ref 70–99)
GLUCOSE BLDC GLUCOMTR-MCNC: 179 MG/DL — HIGH (ref 70–99)
GLUCOSE BLDC GLUCOMTR-MCNC: 94 MG/DL — SIGNIFICANT CHANGE UP (ref 70–99)
GLUCOSE SERPL-MCNC: 126 MG/DL — HIGH (ref 70–99)
HCT VFR BLD CALC: 35.2 % — LOW (ref 42–52)
HGB BLD-MCNC: 10.9 G/DL — LOW (ref 14–18)
IMM GRANULOCYTES NFR BLD AUTO: 0.8 % — HIGH (ref 0.1–0.3)
LYMPHOCYTES # BLD AUTO: 1.21 K/UL — SIGNIFICANT CHANGE UP (ref 1.2–3.4)
LYMPHOCYTES # BLD AUTO: 13.4 % — LOW (ref 20.5–51.1)
MCHC RBC-ENTMCNC: 22.8 PG — LOW (ref 27–31)
MCHC RBC-ENTMCNC: 31 G/DL — LOW (ref 32–37)
MCV RBC AUTO: 73.6 FL — LOW (ref 80–94)
MONOCYTES # BLD AUTO: 1.11 K/UL — HIGH (ref 0.1–0.6)
MONOCYTES NFR BLD AUTO: 12.3 % — HIGH (ref 1.7–9.3)
NEUTROPHILS # BLD AUTO: 6.22 K/UL — SIGNIFICANT CHANGE UP (ref 1.4–6.5)
NEUTROPHILS NFR BLD AUTO: 69.1 % — SIGNIFICANT CHANGE UP (ref 42.2–75.2)
NRBC # BLD: 0 /100 WBCS — SIGNIFICANT CHANGE UP (ref 0–0)
PLATELET # BLD AUTO: 329 K/UL — SIGNIFICANT CHANGE UP (ref 130–400)
POTASSIUM SERPL-MCNC: 4.3 MMOL/L — SIGNIFICANT CHANGE UP (ref 3.5–5)
POTASSIUM SERPL-SCNC: 4.3 MMOL/L — SIGNIFICANT CHANGE UP (ref 3.5–5)
RBC # BLD: 4.78 M/UL — SIGNIFICANT CHANGE UP (ref 4.7–6.1)
RBC # FLD: 24.8 % — HIGH (ref 11.5–14.5)
SODIUM SERPL-SCNC: 139 MMOL/L — SIGNIFICANT CHANGE UP (ref 135–146)
WBC # BLD: 9 K/UL — SIGNIFICANT CHANGE UP (ref 4.8–10.8)
WBC # FLD AUTO: 9 K/UL — SIGNIFICANT CHANGE UP (ref 4.8–10.8)

## 2020-03-06 PROCEDURE — 99232 SBSQ HOSP IP/OBS MODERATE 35: CPT

## 2020-03-06 RX ORDER — MIDODRINE HYDROCHLORIDE 2.5 MG/1
5 TABLET ORAL EVERY 8 HOURS
Refills: 0 | Status: DISCONTINUED | OUTPATIENT
Start: 2020-03-06 | End: 2020-03-15

## 2020-03-06 RX ADMIN — Medication 0.12 MILLIGRAM(S): at 05:45

## 2020-03-06 RX ADMIN — MIDODRINE HYDROCHLORIDE 2.5 MILLIGRAM(S): 2.5 TABLET ORAL at 13:47

## 2020-03-06 RX ADMIN — HEPARIN SODIUM 5000 UNIT(S): 5000 INJECTION INTRAVENOUS; SUBCUTANEOUS at 21:02

## 2020-03-06 RX ADMIN — Medication 6 UNIT(S): at 12:05

## 2020-03-06 RX ADMIN — CHLORHEXIDINE GLUCONATE 1 APPLICATION(S): 213 SOLUTION TOPICAL at 05:46

## 2020-03-06 RX ADMIN — NYSTATIN CREAM 1 APPLICATION(S): 100000 CREAM TOPICAL at 21:02

## 2020-03-06 RX ADMIN — Medication 1 APPLICATION(S): at 17:02

## 2020-03-06 RX ADMIN — Medication 2: at 17:01

## 2020-03-06 RX ADMIN — ATORVASTATIN CALCIUM 40 MILLIGRAM(S): 80 TABLET, FILM COATED ORAL at 12:04

## 2020-03-06 RX ADMIN — HEPARIN SODIUM 5000 UNIT(S): 5000 INJECTION INTRAVENOUS; SUBCUTANEOUS at 13:47

## 2020-03-06 RX ADMIN — Medication 6 UNIT(S): at 08:40

## 2020-03-06 RX ADMIN — NYSTATIN CREAM 1 APPLICATION(S): 100000 CREAM TOPICAL at 05:45

## 2020-03-06 RX ADMIN — MIDODRINE HYDROCHLORIDE 2.5 MILLIGRAM(S): 2.5 TABLET ORAL at 05:45

## 2020-03-06 RX ADMIN — DULOXETINE HYDROCHLORIDE 90 MILLIGRAM(S): 30 CAPSULE, DELAYED RELEASE ORAL at 12:05

## 2020-03-06 RX ADMIN — Medication 81 MILLIGRAM(S): at 12:04

## 2020-03-06 RX ADMIN — Medication 50 MILLIGRAM(S): at 05:45

## 2020-03-06 RX ADMIN — MIDODRINE HYDROCHLORIDE 5 MILLIGRAM(S): 2.5 TABLET ORAL at 21:02

## 2020-03-06 RX ADMIN — Medication 1 APPLICATION(S): at 05:46

## 2020-03-06 RX ADMIN — HEPARIN SODIUM 5000 UNIT(S): 5000 INJECTION INTRAVENOUS; SUBCUTANEOUS at 05:46

## 2020-03-06 RX ADMIN — TAMSULOSIN HYDROCHLORIDE 0.4 MILLIGRAM(S): 0.4 CAPSULE ORAL at 21:02

## 2020-03-06 RX ADMIN — INSULIN GLARGINE 20 UNIT(S): 100 INJECTION, SOLUTION SUBCUTANEOUS at 21:59

## 2020-03-06 RX ADMIN — PANTOPRAZOLE SODIUM 40 MILLIGRAM(S): 20 TABLET, DELAYED RELEASE ORAL at 21:02

## 2020-03-06 RX ADMIN — Medication 6 UNIT(S): at 17:01

## 2020-03-06 RX ADMIN — PRASUGREL 10 MILLIGRAM(S): 5 TABLET, FILM COATED ORAL at 12:05

## 2020-03-06 RX ADMIN — NYSTATIN CREAM 1 APPLICATION(S): 100000 CREAM TOPICAL at 13:48
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
## 2020-03-06 NOTE — PROGRESS NOTE ADULT - SUBJECTIVE AND OBJECTIVE BOX
FLOWER MARISCAL  64y  Male      Patient is a 64y old  Male who presents with a chief complaint of Altered mental status (05 Mar 2020 11:09)      INTERVAL HPI/OVERNIGHT EVENTS:      ******************************* REVIEW OF SYSTEMS:**********************************************    All other review of systems negative    *********************** VITALS ******************************************    T(F): 96.8 (03-06-20 @ 04:40)  HR: 81 (03-06-20 @ 04:40) (81 - 90)  BP: 111/70 (03-06-20 @ 04:40) (100/50 - 115/58)  RR: 18 (03-06-20 @ 04:40) (18 - 18)  SpO2: --    03-05-20 @ 07:01  -  03-06-20 @ 07:00  --------------------------------------------------------  IN: 0 mL / OUT: 500 mL / NET: -500 mL            03-05-20 @ 07:01  -  03-06-20 @ 07:00  --------------------------------------------------------  IN: 0 mL / OUT: 500 mL / NET: -500 mL        ******************************** PHYSICAL EXAM:**************************************************  GENERAL: NAD    PSYCH: no agitation, baseline mentation  HEENT:     NERVOUS SYSTEM:  Alert & Oriented X3  PULMONARY: CRYSTAL, CTA    CARDIOVASCULAR: S1S2 RRR    GI: Soft, NT, ND; BS present.    EXTREMITIES:  2+ Peripheral Pulses, No clubbing, cyanosis, or edema    LYMPH: No lymphadenopathy noted    SKIN: No rashes or lesions    ******************************************************************************************      **************************** LABS *******************************************************                          10.9   9.00  )-----------( 329      ( 06 Mar 2020 05:42 )             35.2     03-06    139  |  101  |  14  ----------------------------<  126<H>  4.3   |  31  |  0.7    Ca    8.3<L>      06 Mar 2020 05:42    TPro  5.5<L>  /  Alb  2.5<L>  /  TBili  0.8  /  DBili  x   /  AST  86<H>  /  ALT  39  /  AlkPhos  203<H>  03-05          Lactate Trend        CAPILLARY BLOOD GLUCOSE      POCT Blood Glucose.: 94 mg/dL (06 Mar 2020 08:05)          **************************Active Medications *******************************************  ACE inhibitors (Hives)  enalapril (Hives)      acetaminophen   Tablet .. 650 milliGRAM(s) Oral every 6 hours PRN  aluminum hydroxide/magnesium hydroxide/simethicone Suspension 30 milliLiter(s) Oral every 6 hours PRN  aspirin enteric coated 81 milliGRAM(s) Oral daily  atorvastatin Oral Tab/Cap - Peds 40 milliGRAM(s) Oral daily  BACItracin   Ointment 1 Application(s) Topical two times a day  chlorhexidine 4% Liquid 1 Application(s) Topical <User Schedule>  dextrose 40% Gel 15 Gram(s) Oral once PRN  dextrose 5%. 1000 milliLiter(s) IV Continuous <Continuous>  dextrose 50% Injectable 12.5 Gram(s) IV Push once  dextrose 50% Injectable 25 Gram(s) IV Push once  dextrose 50% Injectable 25 Gram(s) IV Push once  digoxin     Tablet 0.125 milliGRAM(s) Oral daily  DULoxetine 90 milliGRAM(s) Oral daily  glucagon  Injectable 1 milliGRAM(s) IntraMuscular once PRN  heparin  Injectable 5000 Unit(s) SubCutaneous every 8 hours  insulin glargine Injectable (LANTUS) 20 Unit(s) SubCutaneous at bedtime  insulin lispro (HumaLOG) corrective regimen sliding scale   SubCutaneous three times a day before meals  insulin lispro Injectable (HumaLOG) 6 Unit(s) SubCutaneous three times a day before meals  levoFLOXacin  Tablet 500 milliGRAM(s) Oral daily  metoprolol succinate ER 50 milliGRAM(s) Oral daily  midodrine. 2.5 milliGRAM(s) Oral every 8 hours  nystatin Powder 1 Application(s) Topical every 8 hours  ondansetron Injectable 4 milliGRAM(s) IV Push once PRN  pantoprazole    Tablet 40 milliGRAM(s) Oral at bedtime  prasugrel 10 milliGRAM(s) Oral daily  tamsulosin Oral Tab/Cap - Peds 0.4 milliGRAM(s) Oral at bedtime      ***************************************************  RADIOLOGY & ADDITIONAL TESTS:    Imaging Personally Reviewed:  [ ] YES  [ ] NO    HEALTH ISSUES - PROBLEM Dx:  Encephalopathy, metabolic  Delirium due to medical condition without behavioral disturbance  Delirium
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
## 2020-03-07 LAB
ALBUMIN SERPL ELPH-MCNC: 2.6 G/DL — LOW (ref 3.5–5.2)
ALP SERPL-CCNC: 213 U/L — HIGH (ref 30–115)
ALT FLD-CCNC: 43 U/L — HIGH (ref 0–41)
ANION GAP SERPL CALC-SCNC: 10 MMOL/L — SIGNIFICANT CHANGE UP (ref 7–14)
AST SERPL-CCNC: 61 U/L — HIGH (ref 0–41)
BASOPHILS # BLD AUTO: 0.05 K/UL — SIGNIFICANT CHANGE UP (ref 0–0.2)
BASOPHILS NFR BLD AUTO: 0.6 % — SIGNIFICANT CHANGE UP (ref 0–1)
BILIRUB SERPL-MCNC: 0.9 MG/DL — SIGNIFICANT CHANGE UP (ref 0.2–1.2)
BUN SERPL-MCNC: 11 MG/DL — SIGNIFICANT CHANGE UP (ref 10–20)
CALCIUM SERPL-MCNC: 8.3 MG/DL — LOW (ref 8.5–10.1)
CHLORIDE SERPL-SCNC: 101 MMOL/L — SIGNIFICANT CHANGE UP (ref 98–110)
CO2 SERPL-SCNC: 29 MMOL/L — SIGNIFICANT CHANGE UP (ref 17–32)
CREAT SERPL-MCNC: 0.5 MG/DL — LOW (ref 0.7–1.5)
EOSINOPHIL # BLD AUTO: 0.31 K/UL — SIGNIFICANT CHANGE UP (ref 0–0.7)
EOSINOPHIL NFR BLD AUTO: 3.8 % — SIGNIFICANT CHANGE UP (ref 0–8)
GLUCOSE BLDC GLUCOMTR-MCNC: 122 MG/DL — HIGH (ref 70–99)
GLUCOSE BLDC GLUCOMTR-MCNC: 187 MG/DL — HIGH (ref 70–99)
GLUCOSE BLDC GLUCOMTR-MCNC: 216 MG/DL — HIGH (ref 70–99)
GLUCOSE BLDC GLUCOMTR-MCNC: 92 MG/DL — SIGNIFICANT CHANGE UP (ref 70–99)
GLUCOSE SERPL-MCNC: 105 MG/DL — HIGH (ref 70–99)
HCT VFR BLD CALC: 36.9 % — LOW (ref 42–52)
HGB BLD-MCNC: 11.4 G/DL — LOW (ref 14–18)
IMM GRANULOCYTES NFR BLD AUTO: 0.5 % — HIGH (ref 0.1–0.3)
LYMPHOCYTES # BLD AUTO: 1.31 K/UL — SIGNIFICANT CHANGE UP (ref 1.2–3.4)
LYMPHOCYTES # BLD AUTO: 15.9 % — LOW (ref 20.5–51.1)
MCHC RBC-ENTMCNC: 22.8 PG — LOW (ref 27–31)
MCHC RBC-ENTMCNC: 30.9 G/DL — LOW (ref 32–37)
MCV RBC AUTO: 73.8 FL — LOW (ref 80–94)
MONOCYTES # BLD AUTO: 0.92 K/UL — HIGH (ref 0.1–0.6)
MONOCYTES NFR BLD AUTO: 11.1 % — HIGH (ref 1.7–9.3)
NEUTROPHILS # BLD AUTO: 5.63 K/UL — SIGNIFICANT CHANGE UP (ref 1.4–6.5)
NEUTROPHILS NFR BLD AUTO: 68.1 % — SIGNIFICANT CHANGE UP (ref 42.2–75.2)
NRBC # BLD: 0 /100 WBCS — SIGNIFICANT CHANGE UP (ref 0–0)
PLATELET # BLD AUTO: 306 K/UL — SIGNIFICANT CHANGE UP (ref 130–400)
POTASSIUM SERPL-MCNC: 4.1 MMOL/L — SIGNIFICANT CHANGE UP (ref 3.5–5)
POTASSIUM SERPL-SCNC: 4.1 MMOL/L — SIGNIFICANT CHANGE UP (ref 3.5–5)
PROT SERPL-MCNC: 5.5 G/DL — LOW (ref 6–8)
RBC # BLD: 5 M/UL — SIGNIFICANT CHANGE UP (ref 4.7–6.1)
RBC # FLD: 25.3 % — HIGH (ref 11.5–14.5)
SODIUM SERPL-SCNC: 140 MMOL/L — SIGNIFICANT CHANGE UP (ref 135–146)
WBC # BLD: 8.26 K/UL — SIGNIFICANT CHANGE UP (ref 4.8–10.8)
WBC # FLD AUTO: 8.26 K/UL — SIGNIFICANT CHANGE UP (ref 4.8–10.8)

## 2020-03-07 PROCEDURE — 99232 SBSQ HOSP IP/OBS MODERATE 35: CPT

## 2020-03-07 PROCEDURE — 93284 PRGRMG EVAL IMPLANTABLE DFB: CPT | Mod: 26

## 2020-03-07 PROCEDURE — 99222 1ST HOSP IP/OBS MODERATE 55: CPT | Mod: 25

## 2020-03-07 RX ORDER — EZETIMIBE 10 MG/1
1 TABLET ORAL
Qty: 0 | Refills: 0 | DISCHARGE

## 2020-03-07 RX ORDER — FUROSEMIDE 40 MG
1 TABLET ORAL
Qty: 0 | Refills: 0 | DISCHARGE

## 2020-03-07 RX ADMIN — Medication 6 UNIT(S): at 17:22

## 2020-03-07 RX ADMIN — MIDODRINE HYDROCHLORIDE 5 MILLIGRAM(S): 2.5 TABLET ORAL at 13:21

## 2020-03-07 RX ADMIN — ATORVASTATIN CALCIUM 40 MILLIGRAM(S): 80 TABLET, FILM COATED ORAL at 11:40

## 2020-03-07 RX ADMIN — INSULIN GLARGINE 20 UNIT(S): 100 INJECTION, SOLUTION SUBCUTANEOUS at 22:29

## 2020-03-07 RX ADMIN — HEPARIN SODIUM 5000 UNIT(S): 5000 INJECTION INTRAVENOUS; SUBCUTANEOUS at 13:21

## 2020-03-07 RX ADMIN — MIDODRINE HYDROCHLORIDE 5 MILLIGRAM(S): 2.5 TABLET ORAL at 05:31

## 2020-03-07 RX ADMIN — Medication 2: at 17:22

## 2020-03-07 RX ADMIN — Medication 0.12 MILLIGRAM(S): at 05:31

## 2020-03-07 RX ADMIN — HEPARIN SODIUM 5000 UNIT(S): 5000 INJECTION INTRAVENOUS; SUBCUTANEOUS at 21:16

## 2020-03-07 RX ADMIN — MIDODRINE HYDROCHLORIDE 5 MILLIGRAM(S): 2.5 TABLET ORAL at 21:16

## 2020-03-07 RX ADMIN — NYSTATIN CREAM 1 APPLICATION(S): 100000 CREAM TOPICAL at 21:18

## 2020-03-07 RX ADMIN — Medication 1 APPLICATION(S): at 17:23

## 2020-03-07 RX ADMIN — DULOXETINE HYDROCHLORIDE 90 MILLIGRAM(S): 30 CAPSULE, DELAYED RELEASE ORAL at 11:39

## 2020-03-07 RX ADMIN — NYSTATIN CREAM 1 APPLICATION(S): 100000 CREAM TOPICAL at 13:21

## 2020-03-07 RX ADMIN — Medication 81 MILLIGRAM(S): at 11:39

## 2020-03-07 RX ADMIN — HEPARIN SODIUM 5000 UNIT(S): 5000 INJECTION INTRAVENOUS; SUBCUTANEOUS at 05:31

## 2020-03-07 RX ADMIN — NYSTATIN CREAM 1 APPLICATION(S): 100000 CREAM TOPICAL at 05:32

## 2020-03-07 RX ADMIN — Medication 50 MILLIGRAM(S): at 05:33

## 2020-03-07 RX ADMIN — PANTOPRAZOLE SODIUM 40 MILLIGRAM(S): 20 TABLET, DELAYED RELEASE ORAL at 21:16

## 2020-03-07 RX ADMIN — TAMSULOSIN HYDROCHLORIDE 0.4 MILLIGRAM(S): 0.4 CAPSULE ORAL at 21:16

## 2020-03-07 RX ADMIN — PRASUGREL 10 MILLIGRAM(S): 5 TABLET, FILM COATED ORAL at 11:40

## 2020-03-07 RX ADMIN — Medication 1 APPLICATION(S): at 05:32

## 2020-03-07 RX ADMIN — Medication 6 UNIT(S): at 07:48

## 2020-03-07 RX ADMIN — Medication 6 UNIT(S): at 11:41
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
## 2020-03-07 NOTE — PROGRESS NOTE ADULT - SUBJECTIVE AND OBJECTIVE BOX
SUBJECTIVE:    Patient is a 64y old Male who presents with a chief complaint of Altered mental status (06 Mar 2020 11:51)    Currently admitted to medicine with the primary diagnosis of Metabolic encephalopathy     Today is hospital day 15d. This morning he is resting comfortably in bed and reports no new issues or overnight events, patient is pending placement at SNF on Monday.     PAST MEDICAL & SURGICAL HISTORY  Diabetic neuropathy  STEMI (ST elevation myocardial infarction)  Diverticulitis  MRSA bacteremia  History of celiac disease  CHF (congestive heart failure): EF ~ 25%  HTN (hypertension)  Diabetes: on insulin pump  Blood clot due to device, implant, or graft: was on blood thinners  HLD (hyperlipidemia)  Osteoarthritis  Atherosclerosis of coronary artery: CAD (coronary artery disease)  Status post percutaneous transluminal coronary angioplasty: in 2012  History of open reduction and internal fixation (ORIF) procedure  Surgery, elective: Right shoulder  Surgery, elective: right knee wound debridement  S/P TKR (total knee replacement), right: with infection Mrsa   per pt he was cleared from MRSA infection  S/P CABG x 1: 2018  Stented coronary artery: 10/18 heart attack  INFERIOR WALL MI  Other postprocedural status: Fixation hardware in foot LEFT    SOCIAL HISTORY:    ALLERGIES:  ACE inhibitors (Hives)  enalapril (Hives)    MEDICATIONS:  STANDING MEDICATIONS  aspirin enteric coated 81 milliGRAM(s) Oral daily  atorvastatin Oral Tab/Cap - Peds 40 milliGRAM(s) Oral daily  BACItracin   Ointment 1 Application(s) Topical two times a day  chlorhexidine 4% Liquid 1 Application(s) Topical <User Schedule>  dextrose 5%. 1000 milliLiter(s) IV Continuous <Continuous>  dextrose 50% Injectable 12.5 Gram(s) IV Push once  dextrose 50% Injectable 25 Gram(s) IV Push once  dextrose 50% Injectable 25 Gram(s) IV Push once  digoxin     Tablet 0.125 milliGRAM(s) Oral daily  DULoxetine 90 milliGRAM(s) Oral daily  heparin  Injectable 5000 Unit(s) SubCutaneous every 8 hours  insulin glargine Injectable (LANTUS) 20 Unit(s) SubCutaneous at bedtime  insulin lispro (HumaLOG) corrective regimen sliding scale   SubCutaneous three times a day before meals  insulin lispro Injectable (HumaLOG) 6 Unit(s) SubCutaneous three times a day before meals  metoprolol succinate ER 50 milliGRAM(s) Oral daily  midodrine. 5 milliGRAM(s) Oral every 8 hours  nystatin Powder 1 Application(s) Topical every 8 hours  pantoprazole    Tablet 40 milliGRAM(s) Oral at bedtime  prasugrel 10 milliGRAM(s) Oral daily  tamsulosin Oral Tab/Cap - Peds 0.4 milliGRAM(s) Oral at bedtime    PRN MEDICATIONS  acetaminophen   Tablet .. 650 milliGRAM(s) Oral every 6 hours PRN  aluminum hydroxide/magnesium hydroxide/simethicone Suspension 30 milliLiter(s) Oral every 6 hours PRN  dextrose 40% Gel 15 Gram(s) Oral once PRN  glucagon  Injectable 1 milliGRAM(s) IntraMuscular once PRN  ondansetron Injectable 4 milliGRAM(s) IV Push once PRN    VITALS:   T(F): 97.9  HR: 77  BP: 103/62  RR: 18  SpO2: --    LABS:                        11.4   8.26  )-----------( 306      ( 07 Mar 2020 06:27 )             36.9     03-07    140  |  101  |  11  ----------------------------<  105<H>  4.1   |  29  |  0.5<L>    Ca    8.3<L>      07 Mar 2020 06:27    TPro  5.5<L>  /  Alb  2.6<L>  /  TBili  0.9  /  DBili  x   /  AST  61<H>  /  ALT  43<H>  /  AlkPhos  213<H>  03-07                      RADIOLOGY:    PHYSICAL EXAM:  GENERAL: NAD, well-groomed,   HEAD:  NCAT  EYES: EOMI, PERRLA, conjunctiva clear  ENMT: No tonsillar erythema, exudates, or enlargement;   NECK: Supple, No JVD, Normal thyroid  NERVOUS SYSTEM: AAOX4, Good concentration;  CHEST/LUNG: CTA b/l no w/r/r  HEART: +s1s2 RRR no m/g/r  ABDOMEN: soft, NT/ND (+) bs, no HSM, biliary drain  EXTREMITIES:  2+ Peripheral Pulses, No c/c/e  LYMPH: No lymphadenopathy noted  SKIN: No rashes or lesions

## 2020-03-07 NOTE — PROGRESS NOTE ADULT - SUBJECTIVE AND OBJECTIVE BOX
FLOWER MARISCAL  64y  Male      Patient is a 64y old  Male who presents with a chief complaint of Altered mental status (07 Mar 2020 10:22)      INTERVAL HPI/OVERNIGHT EVENTS:      ******************************* REVIEW OF SYSTEMS:**********************************************    resting in bed.   All other review of systems negative    *********************** VITALS ******************************************    T(F): 97.9 (03-07-20 @ 04:40)  HR: 77 (03-07-20 @ 04:40) (76 - 80)  BP: 103/62 (03-07-20 @ 04:40) (103/62 - 110/63)  RR: 18 (03-07-20 @ 04:40) (18 - 18)  SpO2: --    03-06-20 @ 07:01  -  03-07-20 @ 07:00  --------------------------------------------------------  IN: 0 mL / OUT: 1365 mL / NET: -1365 mL    03-07-20 @ 06:01  -  03-07-20 @ 10:48  --------------------------------------------------------  IN: 180 mL / OUT: 0 mL / NET: 180 mL            03-06-20 @ 07:01  -  03-07-20 @ 07:00  --------------------------------------------------------  IN: 0 mL / OUT: 1365 mL / NET: -1365 mL    03-07-20 @ 06:01  -  03-07-20 @ 10:48  --------------------------------------------------------  IN: 180 mL / OUT: 0 mL / NET: 180 mL        ******************************** PHYSICAL EXAM:**************************************************  GENERAL: NAD    PSYCH: no agitation, baseline mentation  HEENT:     NERVOUS SYSTEM:  Alert & Oriented X3,   PULMONARY: CRYSTAL, CTA    CARDIOVASCULAR: S1S2 RRR    GI: Soft, NT, ND; BS present.    EXTREMITIES:  2+ Peripheral Pulses, No clubbing, cyanosis, or edema    LYMPH: No lymphadenopathy noted    SKIN: No rashes or lesions    ******************************************************************************************      **************************** LABS *******************************************************                          11.4   8.26  )-----------( 306      ( 07 Mar 2020 06:27 )             36.9     03-07    140  |  101  |  11  ----------------------------<  105<H>  4.1   |  29  |  0.5<L>    Ca    8.3<L>      07 Mar 2020 06:27    TPro  5.5<L>  /  Alb  2.6<L>  /  TBili  0.9  /  DBili  x   /  AST  61<H>  /  ALT  43<H>  /  AlkPhos  213<H>  03-07          Lactate Trend        CAPILLARY BLOOD GLUCOSE      POCT Blood Glucose.: 92 mg/dL (07 Mar 2020 07:35)          **************************Active Medications *******************************************  ACE inhibitors (Hives)  enalapril (Hives)      acetaminophen   Tablet .. 650 milliGRAM(s) Oral every 6 hours PRN  aluminum hydroxide/magnesium hydroxide/simethicone Suspension 30 milliLiter(s) Oral every 6 hours PRN  aspirin enteric coated 81 milliGRAM(s) Oral daily  atorvastatin Oral Tab/Cap - Peds 40 milliGRAM(s) Oral daily  BACItracin   Ointment 1 Application(s) Topical two times a day  chlorhexidine 4% Liquid 1 Application(s) Topical <User Schedule>  dextrose 40% Gel 15 Gram(s) Oral once PRN  dextrose 5%. 1000 milliLiter(s) IV Continuous <Continuous>  dextrose 50% Injectable 12.5 Gram(s) IV Push once  dextrose 50% Injectable 25 Gram(s) IV Push once  dextrose 50% Injectable 25 Gram(s) IV Push once  digoxin     Tablet 0.125 milliGRAM(s) Oral daily  DULoxetine 90 milliGRAM(s) Oral daily  glucagon  Injectable 1 milliGRAM(s) IntraMuscular once PRN  heparin  Injectable 5000 Unit(s) SubCutaneous every 8 hours  insulin glargine Injectable (LANTUS) 20 Unit(s) SubCutaneous at bedtime  insulin lispro (HumaLOG) corrective regimen sliding scale   SubCutaneous three times a day before meals  insulin lispro Injectable (HumaLOG) 6 Unit(s) SubCutaneous three times a day before meals  levoFLOXacin  Tablet 500 milliGRAM(s) Oral every 24 hours  metoprolol succinate ER 50 milliGRAM(s) Oral daily  midodrine. 5 milliGRAM(s) Oral every 8 hours  nystatin Powder 1 Application(s) Topical every 8 hours  ondansetron Injectable 4 milliGRAM(s) IV Push once PRN  pantoprazole    Tablet 40 milliGRAM(s) Oral at bedtime  prasugrel 10 milliGRAM(s) Oral daily  tamsulosin Oral Tab/Cap - Peds 0.4 milliGRAM(s) Oral at bedtime      ***************************************************  RADIOLOGY & ADDITIONAL TESTS:    Imaging Personally Reviewed:  [ ] YES  [ ] NO    HEALTH ISSUES - PROBLEM Dx:  Encephalopathy, metabolic  Delirium due to medical condition without behavioral disturbance  Delirium
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
## 2020-03-08 LAB
ALBUMIN SERPL ELPH-MCNC: 2.4 G/DL — LOW (ref 3.5–5.2)
ALP SERPL-CCNC: 203 U/L — HIGH (ref 30–115)
ALT FLD-CCNC: 48 U/L — HIGH (ref 0–41)
ANION GAP SERPL CALC-SCNC: 10 MMOL/L — SIGNIFICANT CHANGE UP (ref 7–14)
AST SERPL-CCNC: 63 U/L — HIGH (ref 0–41)
BILIRUB SERPL-MCNC: 0.7 MG/DL — SIGNIFICANT CHANGE UP (ref 0.2–1.2)
BUN SERPL-MCNC: 15 MG/DL — SIGNIFICANT CHANGE UP (ref 10–20)
CALCIUM SERPL-MCNC: 8 MG/DL — LOW (ref 8.5–10.1)
CHLORIDE SERPL-SCNC: 102 MMOL/L — SIGNIFICANT CHANGE UP (ref 98–110)
CO2 SERPL-SCNC: 28 MMOL/L — SIGNIFICANT CHANGE UP (ref 17–32)
CREAT SERPL-MCNC: 0.6 MG/DL — LOW (ref 0.7–1.5)
CULTURE RESULTS: SIGNIFICANT CHANGE UP
GLUCOSE BLDC GLUCOMTR-MCNC: 103 MG/DL — HIGH (ref 70–99)
GLUCOSE BLDC GLUCOMTR-MCNC: 161 MG/DL — HIGH (ref 70–99)
GLUCOSE BLDC GLUCOMTR-MCNC: 188 MG/DL — HIGH (ref 70–99)
GLUCOSE BLDC GLUCOMTR-MCNC: 197 MG/DL — HIGH (ref 70–99)
GLUCOSE SERPL-MCNC: 105 MG/DL — HIGH (ref 70–99)
POTASSIUM SERPL-MCNC: 4.5 MMOL/L — SIGNIFICANT CHANGE UP (ref 3.5–5)
POTASSIUM SERPL-SCNC: 4.5 MMOL/L — SIGNIFICANT CHANGE UP (ref 3.5–5)
PROT SERPL-MCNC: 5.4 G/DL — LOW (ref 6–8)
SODIUM SERPL-SCNC: 140 MMOL/L — SIGNIFICANT CHANGE UP (ref 135–146)
SPECIMEN SOURCE: SIGNIFICANT CHANGE UP

## 2020-03-08 PROCEDURE — 99232 SBSQ HOSP IP/OBS MODERATE 35: CPT

## 2020-03-08 RX ADMIN — Medication 1 APPLICATION(S): at 05:22

## 2020-03-08 RX ADMIN — Medication 0: at 08:13

## 2020-03-08 RX ADMIN — PANTOPRAZOLE SODIUM 40 MILLIGRAM(S): 20 TABLET, DELAYED RELEASE ORAL at 21:01

## 2020-03-08 RX ADMIN — Medication 0.12 MILLIGRAM(S): at 05:20

## 2020-03-08 RX ADMIN — NYSTATIN CREAM 1 APPLICATION(S): 100000 CREAM TOPICAL at 05:20

## 2020-03-08 RX ADMIN — ATORVASTATIN CALCIUM 40 MILLIGRAM(S): 80 TABLET, FILM COATED ORAL at 11:35

## 2020-03-08 RX ADMIN — Medication 50 MILLIGRAM(S): at 05:24

## 2020-03-08 RX ADMIN — Medication 6 UNIT(S): at 08:13

## 2020-03-08 RX ADMIN — NYSTATIN CREAM 1 APPLICATION(S): 100000 CREAM TOPICAL at 14:41

## 2020-03-08 RX ADMIN — HEPARIN SODIUM 5000 UNIT(S): 5000 INJECTION INTRAVENOUS; SUBCUTANEOUS at 14:41

## 2020-03-08 RX ADMIN — Medication 1 APPLICATION(S): at 17:30

## 2020-03-08 RX ADMIN — TAMSULOSIN HYDROCHLORIDE 0.4 MILLIGRAM(S): 0.4 CAPSULE ORAL at 21:01

## 2020-03-08 RX ADMIN — MIDODRINE HYDROCHLORIDE 5 MILLIGRAM(S): 2.5 TABLET ORAL at 21:01

## 2020-03-08 RX ADMIN — Medication 2: at 17:31

## 2020-03-08 RX ADMIN — INSULIN GLARGINE 20 UNIT(S): 100 INJECTION, SOLUTION SUBCUTANEOUS at 21:29

## 2020-03-08 RX ADMIN — HEPARIN SODIUM 5000 UNIT(S): 5000 INJECTION INTRAVENOUS; SUBCUTANEOUS at 05:21

## 2020-03-08 RX ADMIN — DULOXETINE HYDROCHLORIDE 90 MILLIGRAM(S): 30 CAPSULE, DELAYED RELEASE ORAL at 11:35

## 2020-03-08 RX ADMIN — Medication 6 UNIT(S): at 11:38

## 2020-03-08 RX ADMIN — Medication 6 UNIT(S): at 17:31

## 2020-03-08 RX ADMIN — PRASUGREL 10 MILLIGRAM(S): 5 TABLET, FILM COATED ORAL at 11:34

## 2020-03-08 RX ADMIN — Medication 81 MILLIGRAM(S): at 11:35

## 2020-03-08 RX ADMIN — MIDODRINE HYDROCHLORIDE 5 MILLIGRAM(S): 2.5 TABLET ORAL at 14:41

## 2020-03-08 RX ADMIN — CHLORHEXIDINE GLUCONATE 1 APPLICATION(S): 213 SOLUTION TOPICAL at 05:21

## 2020-03-08 RX ADMIN — NYSTATIN CREAM 1 APPLICATION(S): 100000 CREAM TOPICAL at 21:02

## 2020-03-08 RX ADMIN — MIDODRINE HYDROCHLORIDE 5 MILLIGRAM(S): 2.5 TABLET ORAL at 05:21

## 2020-03-08 RX ADMIN — Medication 2: at 11:38

## 2020-03-08 RX ADMIN — HEPARIN SODIUM 5000 UNIT(S): 5000 INJECTION INTRAVENOUS; SUBCUTANEOUS at 21:02
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
## 2020-03-08 NOTE — PROGRESS NOTE ADULT - SUBJECTIVE AND OBJECTIVE BOX
FLOWER MARISCAL  64y  Male      Patient is a 64y old  Male who presents with a chief complaint of Altered mental status (07 Mar 2020 10:48)      INTERVAL HPI/OVERNIGHT EVENTS:      ******************************* REVIEW OF SYSTEMS:**********************************************  resting in bed,.   All other review of systems negative    *********************** VITALS ******************************************    T(F): 97.1 (03-08-20 @ 05:34)  HR: 76 (03-08-20 @ 05:34) (76 - 77)  BP: 109/57 (03-08-20 @ 05:34) (106/60 - 109/57)  RR: 18 (03-08-20 @ 05:34) (18 - 18)  SpO2: --    03-07-20 @ 06:01  -  03-08-20 @ 07:00  --------------------------------------------------------  IN: 430 mL / OUT: 100 mL / NET: 330 mL            03-07-20 @ 06:01  -  03-08-20 @ 07:00  --------------------------------------------------------  IN: 430 mL / OUT: 100 mL / NET: 330 mL        ******************************** PHYSICAL EXAM:**************************************************  GENERAL: NAD    PSYCH: no agitation, baseline mentation  HEENT:     NERVOUS SYSTEM:  Alert & Oriented X2-3  PULMONARY: CRYSTAL, CTA    CARDIOVASCULAR: S1S2 RRR    GI: Soft, NT, ND; BS present.    EXTREMITIES:  2+ Peripheral Pulses, No clubbing, cyanosis, or edema    LYMPH: No lymphadenopathy noted    SKIN: No rashes or lesions    ******************************************************************************************      **************************** LABS *******************************************************                          11.4   8.26  )-----------( 306      ( 07 Mar 2020 06:27 )             36.9     03-08    140  |  102  |  15  ----------------------------<  105<H>  4.5   |  28  |  0.6<L>    Ca    8.0<L>      08 Mar 2020 06:36    TPro  5.4<L>  /  Alb  2.4<L>  /  TBili  0.7  /  DBili  x   /  AST  63<H>  /  ALT  48<H>  /  AlkPhos  203<H>  03-08          Lactate Trend        CAPILLARY BLOOD GLUCOSE      POCT Blood Glucose.: 188 mg/dL (08 Mar 2020 11:35)          **************************Active Medications *******************************************  ACE inhibitors (Hives)  enalapril (Hives)      acetaminophen   Tablet .. 650 milliGRAM(s) Oral every 6 hours PRN  aluminum hydroxide/magnesium hydroxide/simethicone Suspension 30 milliLiter(s) Oral every 6 hours PRN  aspirin enteric coated 81 milliGRAM(s) Oral daily  atorvastatin Oral Tab/Cap - Peds 40 milliGRAM(s) Oral daily  BACItracin   Ointment 1 Application(s) Topical two times a day  chlorhexidine 4% Liquid 1 Application(s) Topical <User Schedule>  dextrose 40% Gel 15 Gram(s) Oral once PRN  dextrose 5%. 1000 milliLiter(s) IV Continuous <Continuous>  dextrose 50% Injectable 12.5 Gram(s) IV Push once  dextrose 50% Injectable 25 Gram(s) IV Push once  dextrose 50% Injectable 25 Gram(s) IV Push once  digoxin     Tablet 0.125 milliGRAM(s) Oral daily  DULoxetine 90 milliGRAM(s) Oral daily  glucagon  Injectable 1 milliGRAM(s) IntraMuscular once PRN  heparin  Injectable 5000 Unit(s) SubCutaneous every 8 hours  insulin glargine Injectable (LANTUS) 20 Unit(s) SubCutaneous at bedtime  insulin lispro (HumaLOG) corrective regimen sliding scale   SubCutaneous three times a day before meals  insulin lispro Injectable (HumaLOG) 6 Unit(s) SubCutaneous three times a day before meals  metoprolol succinate ER 50 milliGRAM(s) Oral daily  midodrine. 5 milliGRAM(s) Oral every 8 hours  nystatin Powder 1 Application(s) Topical every 8 hours  ondansetron Injectable 4 milliGRAM(s) IV Push once PRN  pantoprazole    Tablet 40 milliGRAM(s) Oral at bedtime  prasugrel 10 milliGRAM(s) Oral daily  tamsulosin Oral Tab/Cap - Peds 0.4 milliGRAM(s) Oral at bedtime      ***************************************************  RADIOLOGY & ADDITIONAL TESTS:    Imaging Personally Reviewed:  [ ] YES  [ ] NO    HEALTH ISSUES - PROBLEM Dx:  Encephalopathy, metabolic  Delirium due to medical condition without behavioral disturbance  Delirium

## 2020-03-09 LAB
ALBUMIN SERPL ELPH-MCNC: 2.6 G/DL — LOW (ref 3.5–5.2)
ALP SERPL-CCNC: 195 U/L — HIGH (ref 30–115)
ALT FLD-CCNC: 45 U/L — HIGH (ref 0–41)
ANION GAP SERPL CALC-SCNC: 9 MMOL/L — SIGNIFICANT CHANGE UP (ref 7–14)
AST SERPL-CCNC: 48 U/L — HIGH (ref 0–41)
BASOPHILS # BLD AUTO: 0.06 K/UL — SIGNIFICANT CHANGE UP (ref 0–0.2)
BASOPHILS NFR BLD AUTO: 0.6 % — SIGNIFICANT CHANGE UP (ref 0–1)
BILIRUB SERPL-MCNC: 0.7 MG/DL — SIGNIFICANT CHANGE UP (ref 0.2–1.2)
BUN SERPL-MCNC: 17 MG/DL — SIGNIFICANT CHANGE UP (ref 10–20)
CALCIUM SERPL-MCNC: 8.1 MG/DL — LOW (ref 8.5–10.1)
CHLORIDE SERPL-SCNC: 101 MMOL/L — SIGNIFICANT CHANGE UP (ref 98–110)
CO2 SERPL-SCNC: 29 MMOL/L — SIGNIFICANT CHANGE UP (ref 17–32)
CREAT SERPL-MCNC: 0.6 MG/DL — LOW (ref 0.7–1.5)
EOSINOPHIL # BLD AUTO: 0.28 K/UL — SIGNIFICANT CHANGE UP (ref 0–0.7)
EOSINOPHIL NFR BLD AUTO: 3 % — SIGNIFICANT CHANGE UP (ref 0–8)
GLUCOSE BLDC GLUCOMTR-MCNC: 123 MG/DL — HIGH (ref 70–99)
GLUCOSE BLDC GLUCOMTR-MCNC: 127 MG/DL — HIGH (ref 70–99)
GLUCOSE BLDC GLUCOMTR-MCNC: 190 MG/DL — HIGH (ref 70–99)
GLUCOSE BLDC GLUCOMTR-MCNC: 197 MG/DL — HIGH (ref 70–99)
GLUCOSE SERPL-MCNC: 163 MG/DL — HIGH (ref 70–99)
HCT VFR BLD CALC: 34.4 % — LOW (ref 42–52)
HGB BLD-MCNC: 10.4 G/DL — LOW (ref 14–18)
IMM GRANULOCYTES NFR BLD AUTO: 0.4 % — HIGH (ref 0.1–0.3)
LYMPHOCYTES # BLD AUTO: 1.16 K/UL — LOW (ref 1.2–3.4)
LYMPHOCYTES # BLD AUTO: 12.3 % — LOW (ref 20.5–51.1)
MCHC RBC-ENTMCNC: 22.5 PG — LOW (ref 27–31)
MCHC RBC-ENTMCNC: 30.2 G/DL — LOW (ref 32–37)
MCV RBC AUTO: 74.5 FL — LOW (ref 80–94)
MONOCYTES # BLD AUTO: 0.87 K/UL — HIGH (ref 0.1–0.6)
MONOCYTES NFR BLD AUTO: 9.2 % — SIGNIFICANT CHANGE UP (ref 1.7–9.3)
NEUTROPHILS # BLD AUTO: 7.02 K/UL — HIGH (ref 1.4–6.5)
NEUTROPHILS NFR BLD AUTO: 74.5 % — SIGNIFICANT CHANGE UP (ref 42.2–75.2)
NRBC # BLD: 0 /100 WBCS — SIGNIFICANT CHANGE UP (ref 0–0)
PLATELET # BLD AUTO: 317 K/UL — SIGNIFICANT CHANGE UP (ref 130–400)
POTASSIUM SERPL-MCNC: 4.6 MMOL/L — SIGNIFICANT CHANGE UP (ref 3.5–5)
POTASSIUM SERPL-SCNC: 4.6 MMOL/L — SIGNIFICANT CHANGE UP (ref 3.5–5)
PROT SERPL-MCNC: 5.5 G/DL — LOW (ref 6–8)
RBC # BLD: 4.62 M/UL — LOW (ref 4.7–6.1)
RBC # FLD: 25.6 % — HIGH (ref 11.5–14.5)
SODIUM SERPL-SCNC: 139 MMOL/L — SIGNIFICANT CHANGE UP (ref 135–146)
WBC # BLD: 9.43 K/UL — SIGNIFICANT CHANGE UP (ref 4.8–10.8)
WBC # FLD AUTO: 9.43 K/UL — SIGNIFICANT CHANGE UP (ref 4.8–10.8)

## 2020-03-09 PROCEDURE — 71045 X-RAY EXAM CHEST 1 VIEW: CPT | Mod: 26

## 2020-03-09 PROCEDURE — 93284 PRGRMG EVAL IMPLANTABLE DFB: CPT | Mod: 26

## 2020-03-09 PROCEDURE — 99232 SBSQ HOSP IP/OBS MODERATE 35: CPT | Mod: 25

## 2020-03-09 PROCEDURE — 99232 SBSQ HOSP IP/OBS MODERATE 35: CPT

## 2020-03-09 RX ADMIN — MIDODRINE HYDROCHLORIDE 5 MILLIGRAM(S): 2.5 TABLET ORAL at 13:16

## 2020-03-09 RX ADMIN — Medication 81 MILLIGRAM(S): at 11:13

## 2020-03-09 RX ADMIN — HEPARIN SODIUM 5000 UNIT(S): 5000 INJECTION INTRAVENOUS; SUBCUTANEOUS at 05:18

## 2020-03-09 RX ADMIN — Medication 6 UNIT(S): at 17:06

## 2020-03-09 RX ADMIN — Medication 6 UNIT(S): at 07:42

## 2020-03-09 RX ADMIN — DULOXETINE HYDROCHLORIDE 90 MILLIGRAM(S): 30 CAPSULE, DELAYED RELEASE ORAL at 11:14

## 2020-03-09 RX ADMIN — TAMSULOSIN HYDROCHLORIDE 0.4 MILLIGRAM(S): 0.4 CAPSULE ORAL at 22:31

## 2020-03-09 RX ADMIN — Medication 50 MILLIGRAM(S): at 05:20

## 2020-03-09 RX ADMIN — Medication 0.12 MILLIGRAM(S): at 05:18

## 2020-03-09 RX ADMIN — INSULIN GLARGINE 20 UNIT(S): 100 INJECTION, SOLUTION SUBCUTANEOUS at 22:30

## 2020-03-09 RX ADMIN — NYSTATIN CREAM 1 APPLICATION(S): 100000 CREAM TOPICAL at 13:16

## 2020-03-09 RX ADMIN — PRASUGREL 10 MILLIGRAM(S): 5 TABLET, FILM COATED ORAL at 11:13

## 2020-03-09 RX ADMIN — Medication 1 APPLICATION(S): at 05:19

## 2020-03-09 RX ADMIN — Medication 1 APPLICATION(S): at 17:07

## 2020-03-09 RX ADMIN — HEPARIN SODIUM 5000 UNIT(S): 5000 INJECTION INTRAVENOUS; SUBCUTANEOUS at 13:16

## 2020-03-09 RX ADMIN — MIDODRINE HYDROCHLORIDE 5 MILLIGRAM(S): 2.5 TABLET ORAL at 22:31

## 2020-03-09 RX ADMIN — NYSTATIN CREAM 1 APPLICATION(S): 100000 CREAM TOPICAL at 05:18

## 2020-03-09 RX ADMIN — NYSTATIN CREAM 1 APPLICATION(S): 100000 CREAM TOPICAL at 22:01

## 2020-03-09 RX ADMIN — MIDODRINE HYDROCHLORIDE 5 MILLIGRAM(S): 2.5 TABLET ORAL at 05:18

## 2020-03-09 RX ADMIN — HEPARIN SODIUM 5000 UNIT(S): 5000 INJECTION INTRAVENOUS; SUBCUTANEOUS at 22:31

## 2020-03-09 RX ADMIN — Medication 2: at 17:06

## 2020-03-09 RX ADMIN — ATORVASTATIN CALCIUM 40 MILLIGRAM(S): 80 TABLET, FILM COATED ORAL at 11:13

## 2020-03-09 RX ADMIN — PANTOPRAZOLE SODIUM 40 MILLIGRAM(S): 20 TABLET, DELAYED RELEASE ORAL at 22:31

## 2020-03-09 RX ADMIN — Medication 6 UNIT(S): at 11:42

## 2020-03-09 NOTE — PROGRESS NOTE ADULT - ASSESSMENT
Cardiologist: Dr. Trinidad    63yo M c PMHx HTN, DLD, CAD s/p CABG, HFrEF (25-30% on ECHO 2/2020, 20-25% on MUGA scan in 12/2019) s/p [facility] BiV placement 2/11/2020, DM (on insulin pump), pulmonary hypertension, Celiac disease, diverticulitis, s/p right total knee replacement October 2019 complicated by infection s/p abx course w/ PICC and  right thigh with skin graft, right hip fracture s/p ORIF 2/2020  admitted for metabolic encephalopathy/ found to have UTI, course complicated by severe sepsis secondary to cholecystitis 2/2 cholelithiasis. ICD interrogated and not functioning properly.    Impression:   Sepsis 2/2 cholelithiasis/cholecystitis  Hx HFrEF s/p [facility] BiV ICD on 2/11/2020  Hx CABG  HX HTN  ICD interrogation - poor thresholds, BiV pacing not optimized    Plan:  - STAT CXR to assess ICD leads  - transfer to tele to continuous monitoring  - Cont current meds  - PT/OT   - Will follow Cardiologist: Dr. Trinidad    63yo M c PMHx HTN, DLD, CAD s/p CABG, HFrEF (25-30% on ECHO 2/2020, 20-25% on MUGA scan in 12/2019) s/p [facility] BiV placement 2/11/2020, DM (on insulin pump), pulmonary hypertension, Celiac disease, diverticulitis, s/p right total knee replacement October 2019 complicated by infection s/p abx course w/ PICC and  right thigh with skin graft, right hip fracture s/p ORIF 2/2020  admitted for metabolic encephalopathy/ found to have UTI, course complicated by severe sepsis secondary to cholecystitis 2/2 cholelithiasis. ICD interrogated and not functioning properly.    Impression:   Sepsis 2/2 cholelithiasis/cholecystitis  Hx HFrEF s/p [facility] BiV ICD on 2/11/2020  Hx CABG  HX HTN  ICD interrogation - poor thresholds, BiV pacing not optimized    Plan:  - STAT CXR to assess ICD leads  - transfer to tele to continuous monitoring  - Cont current meds  may require LV lead revisiion will discuss mark DIAS  - PT/OT   - Will follow
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
## 2020-03-09 NOTE — PROGRESS NOTE ADULT - SUBJECTIVE AND OBJECTIVE BOX
FLOWER MARISCAL  64y  Male      Patient is a 64y old  Male who presents with a chief complaint of Altered mental status (09 Mar 2020 10:38)      INTERVAL HPI/OVERNIGHT EVENTS:      ******************************* REVIEW OF SYSTEMS:**********************************************      All other review of systems negative    *********************** VITALS ******************************************    T(F): 95.6 (03-09-20 @ 12:04)  HR: 77 (03-09-20 @ 12:04) (72 - 79)  BP: 119/65 (03-09-20 @ 12:04) (105/55 - 119/65)  RR: 18 (03-09-20 @ 12:04) (18 - 18)  SpO2: --            ******************************** PHYSICAL EXAM:**************************************************  GENERAL: NAD    PSYCH: no agitation, baseline mentation  HEENT:     NERVOUS SYSTEM:  Alert & Oriented X3,     PULMONARY: CRYSTAL, CTA    CARDIOVASCULAR: S1S2 RRR    GI: Soft, NT, ND; BS present.      Still draining MIS drain in place and draining .     EXTREMITIES:  2+ Peripheral Pulses, No clubbing, cyanosis, or edema    LYMPH: No lymphadenopathy noted    SKIN: No rashes or lesions    ******************************************************************************************        **************************** LABS *******************************************************                          10.4   9.43  )-----------( 317      ( 09 Mar 2020 06:18 )             34.4     03-09  139  |  101  |  17  ----------------------------<  163<H>  4.6   |  29  |  0.6<L>    Ca    8.1<L>      09 Mar 2020 06:18    TPro  5.5<L>  /  Alb  2.6<L>  /  TBili  0.7  /  DBili  x   /  AST  48<H>  /  ALT  45<H>  /  AlkPhos  195<H>  03-09          Lactate Trend        CAPILLARY BLOOD GLUCOSE      POCT Blood Glucose.: 123 mg/dL (09 Mar 2020 11:33)          **************************Active Medications *******************************************  ACE inhibitors (Hives)  enalapril (Hives)      acetaminophen   Tablet .. 650 milliGRAM(s) Oral every 6 hours PRN  aluminum hydroxide/magnesium hydroxide/simethicone Suspension 30 milliLiter(s) Oral every 6 hours PRN  aspirin enteric coated 81 milliGRAM(s) Oral daily  atorvastatin Oral Tab/Cap - Peds 40 milliGRAM(s) Oral daily  BACItracin   Ointment 1 Application(s) Topical two times a day  chlorhexidine 4% Liquid 1 Application(s) Topical <User Schedule>  dextrose 40% Gel 15 Gram(s) Oral once PRN  dextrose 5%. 1000 milliLiter(s) IV Continuous <Continuous>  dextrose 50% Injectable 12.5 Gram(s) IV Push once  dextrose 50% Injectable 25 Gram(s) IV Push once  dextrose 50% Injectable 25 Gram(s) IV Push once  digoxin     Tablet 0.125 milliGRAM(s) Oral daily  DULoxetine 90 milliGRAM(s) Oral daily  glucagon  Injectable 1 milliGRAM(s) IntraMuscular once PRN  heparin  Injectable 5000 Unit(s) SubCutaneous every 8 hours  insulin glargine Injectable (LANTUS) 20 Unit(s) SubCutaneous at bedtime  insulin lispro (HumaLOG) corrective regimen sliding scale   SubCutaneous three times a day before meals  insulin lispro Injectable (HumaLOG) 6 Unit(s) SubCutaneous three times a day before meals  metoprolol succinate ER 50 milliGRAM(s) Oral daily  midodrine. 5 milliGRAM(s) Oral every 8 hours  nystatin Powder 1 Application(s) Topical every 8 hours  ondansetron Injectable 4 milliGRAM(s) IV Push once PRN  pantoprazole    Tablet 40 milliGRAM(s) Oral at bedtime  prasugrel 10 milliGRAM(s) Oral daily  tamsulosin Oral Tab/Cap - Peds 0.4 milliGRAM(s) Oral at bedtime      ***************************************************  RADIOLOGY & ADDITIONAL TESTS:    Imaging Personally Reviewed:  [ ] YES  [ ] NO    HEALTH ISSUES - PROBLEM Dx:  Encephalopathy, metabolic  Delirium due to medical condition without behavioral disturbance  Delirium
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
## 2020-03-09 NOTE — PROGRESS NOTE ADULT - SUBJECTIVE AND OBJECTIVE BOX
INTERVAL HPI/OVERNIGHT EVENTS:  Patient resting comfortably. ICD interrogated again this AM. Patient denies fever, chills, dizziness, syncope, chest pain, palpitations, SOB, cough, abd pain.    MEDICATIONS  (STANDING):  aspirin enteric coated 81 milliGRAM(s) Oral daily  atorvastatin Oral Tab/Cap - Peds 40 milliGRAM(s) Oral daily  BACItracin   Ointment 1 Application(s) Topical two times a day  chlorhexidine 4% Liquid 1 Application(s) Topical <User Schedule>  dextrose 5%. 1000 milliLiter(s) (50 mL/Hr) IV Continuous <Continuous>  dextrose 50% Injectable 12.5 Gram(s) IV Push once  dextrose 50% Injectable 25 Gram(s) IV Push once  dextrose 50% Injectable 25 Gram(s) IV Push once  digoxin     Tablet 0.125 milliGRAM(s) Oral daily  DULoxetine 90 milliGRAM(s) Oral daily  heparin  Injectable 5000 Unit(s) SubCutaneous every 8 hours  insulin glargine Injectable (LANTUS) 20 Unit(s) SubCutaneous at bedtime  insulin lispro (HumaLOG) corrective regimen sliding scale   SubCutaneous three times a day before meals  insulin lispro Injectable (HumaLOG) 6 Unit(s) SubCutaneous three times a day before meals  metoprolol succinate ER 50 milliGRAM(s) Oral daily  midodrine. 5 milliGRAM(s) Oral every 8 hours  nystatin Powder 1 Application(s) Topical every 8 hours  pantoprazole    Tablet 40 milliGRAM(s) Oral at bedtime  prasugrel 10 milliGRAM(s) Oral daily  tamsulosin Oral Tab/Cap - Peds 0.4 milliGRAM(s) Oral at bedtime    MEDICATIONS  (PRN):  acetaminophen   Tablet .. 650 milliGRAM(s) Oral every 6 hours PRN Temp greater or equal to 38C (100.4F), Moderate Pain (4 - 6)  aluminum hydroxide/magnesium hydroxide/simethicone Suspension 30 milliLiter(s) Oral every 6 hours PRN Dyspepsia  dextrose 40% Gel 15 Gram(s) Oral once PRN Blood Glucose LESS THAN 70 milliGRAM(s)/deciliter  glucagon  Injectable 1 milliGRAM(s) IntraMuscular once PRN Glucose LESS THAN 70 milligrams/deciliter  ondansetron Injectable 4 milliGRAM(s) IV Push once PRN Nausea and/or Vomiting      Allergies    ACE inhibitors (Hives)  enalapril (Hives)    REVIEW OF SYSTEMS  A ten-point review of systems was otherwise negative except as note      Vital Signs Last 24 Hrs  T(C): 36.3 (09 Mar 2020 04:54), Max: 36.3 (09 Mar 2020 04:54)  T(F): 97.3 (09 Mar 2020 04:54), Max: 97.3 (09 Mar 2020 04:54)  HR: 73 (09 Mar 2020 04:54) (72 - 79)  BP: 113/72 (09 Mar 2020 04:54) (105/55 - 113/72)  BP(mean): --  RR: 18 (09 Mar 2020 04:54) (18 - 18)  SpO2: --      Physical Exam  GENERAL: In no apparent distress, well nourished, and hydrated.  HEART: Regular rate and rhythm; No murmurs, rubs, or gallops.  PULMONARY: Clear to auscultation and perfusion.  No rales, wheezing, or rhonchi bilaterally.  ABDOMEN: Soft, Nontender, Nondistended; Bowel sounds present  EXTREMITIES:  2+ Peripheral Pulses, No clubbing, cyanosis, or edema  NEUROLOGICAL: Grossly nonfocal    LABS:                        10.4   9.43  )-----------( 317      ( 09 Mar 2020 06:18 )             34.4     03-09    139  |  101  |  17  ----------------------------<  163<H>  4.6   |  29  |  0.6<L>    Ca    8.1<L>      09 Mar 2020 06:18    TPro  5.5<L>  /  Alb  2.6<L>  /  TBili  0.7  /  DBili  x   /  AST  48<H>  /  ALT  45<H>  /  AlkPhos  195<H>  03-09      RADIOLOGY & ADDITIONAL TESTS:  < from: Transthoracic Echocardiogram (02.13.20 @ 14:37) >  Summary:   1. Left ventricular ejection fraction, by visual estimation, is 25 to 30%.   2. Severely decreased segmental left ventricular systolic function.   3. Moderate to severely increased left ventricular internal cavity size.   4. Mild to moderate mitral valve regurgitation.   5. Mild-moderate tricuspid regurgitation.   6. Mild aortic regurgitation.   7. Estimated pulmonary artery systolic pressure is 51.8 mmHg assuming a right atrial pressure of 15 mmHg, whichis consistent with moderate pulmonary hypertension.    PHYSICIAN INTERPRETATION:  Left Ventricle: The left ventricular internal cavity size is moderate to severely increased. Left ventricular wall thickness is normal. Left ventricular ejection fraction, by visual estimation, is 25 to 30%. Severely decreased segmental left ventricular systolic function.  Right Ventricle: Normal right ventricular size and function.  Left Atrium: Left atrial enlargement.  Right Atrium: Right atrial enlargement.  Pericardium: There is no evidence of pericardial effusion.  Mitral Valve: No evidence of mitral stenosis. Mild to moderate mitral valve regurgitation is seen.  Tricuspid Valve: Mild-moderate tricuspid regurgitation is visualized. Estimated pulmonary artery systolic pressure is 51.8 mmHg assuming a right atrial pressure of 15 mmHg, which is consistent with moderate pulmonary hypertension.  Aortic Valve: No evidence of aortic stenosis. Aortic valve thickening with normal leaflet opening. Mild aortic valve regurgitation is seen.  Pulmonic Valve: Trace pulmonic valve regurgitation.  Aorta: The aortic root is normal in size and structure.  Venous: The inferior vena cava was dilated, with respiratory size variation less than 50%, consistent with elevated right atrial pressure.  Additional Comments: A pacer wire is visualized in the right atrium and right ventricle.    < end of copied text >    < from: 12 Lead ECG (02.14.20 @ 14:46) >  Ventricular Rate 64 BPM    Atrial Rate 64 BPM    P-R Interval 162 ms    QRS Duration 166 ms    Q-T Interval 426 ms    QTC Calculation(Bezet) 439 ms    P Axis 60 degrees    R Axis -41 degrees    T Axis 64 degrees    Diagnosis Line Atrial-sensed ventricular-paced rhythm with occasional Premature ventricular  complexes  Abnormal ECG    Confirmed by SERGIO BALLARD MD [id number]) on 2/17/2020 10:01:05 AM    < end of copied text >

## 2020-03-10 LAB
ANION GAP SERPL CALC-SCNC: 8 MMOL/L — SIGNIFICANT CHANGE UP (ref 7–14)
BASOPHILS # BLD AUTO: 0.04 K/UL — SIGNIFICANT CHANGE UP (ref 0–0.2)
BASOPHILS NFR BLD AUTO: 0.4 % — SIGNIFICANT CHANGE UP (ref 0–1)
BUN SERPL-MCNC: 14 MG/DL — SIGNIFICANT CHANGE UP (ref 10–20)
CALCIUM SERPL-MCNC: 8.2 MG/DL — LOW (ref 8.5–10.1)
CHLORIDE SERPL-SCNC: 101 MMOL/L — SIGNIFICANT CHANGE UP (ref 98–110)
CO2 SERPL-SCNC: 30 MMOL/L — SIGNIFICANT CHANGE UP (ref 17–32)
CREAT SERPL-MCNC: 0.6 MG/DL — LOW (ref 0.7–1.5)
EOSINOPHIL # BLD AUTO: 0.23 K/UL — SIGNIFICANT CHANGE UP (ref 0–0.7)
EOSINOPHIL NFR BLD AUTO: 2.2 % — SIGNIFICANT CHANGE UP (ref 0–8)
GLUCOSE BLDC GLUCOMTR-MCNC: 125 MG/DL — HIGH (ref 70–99)
GLUCOSE BLDC GLUCOMTR-MCNC: 171 MG/DL — HIGH (ref 70–99)
GLUCOSE BLDC GLUCOMTR-MCNC: 220 MG/DL — HIGH (ref 70–99)
GLUCOSE BLDC GLUCOMTR-MCNC: 232 MG/DL — HIGH (ref 70–99)
GLUCOSE SERPL-MCNC: 184 MG/DL — HIGH (ref 70–99)
HCT VFR BLD CALC: 37.6 % — LOW (ref 42–52)
HGB BLD-MCNC: 11.3 G/DL — LOW (ref 14–18)
IMM GRANULOCYTES NFR BLD AUTO: 0.3 % — SIGNIFICANT CHANGE UP (ref 0.1–0.3)
LYMPHOCYTES # BLD AUTO: 0.97 K/UL — LOW (ref 1.2–3.4)
LYMPHOCYTES # BLD AUTO: 9.3 % — LOW (ref 20.5–51.1)
MCHC RBC-ENTMCNC: 22.5 PG — LOW (ref 27–31)
MCHC RBC-ENTMCNC: 30.1 G/DL — LOW (ref 32–37)
MCV RBC AUTO: 74.9 FL — LOW (ref 80–94)
MONOCYTES # BLD AUTO: 0.83 K/UL — HIGH (ref 0.1–0.6)
MONOCYTES NFR BLD AUTO: 7.9 % — SIGNIFICANT CHANGE UP (ref 1.7–9.3)
NEUTROPHILS # BLD AUTO: 8.38 K/UL — HIGH (ref 1.4–6.5)
NEUTROPHILS NFR BLD AUTO: 79.9 % — HIGH (ref 42.2–75.2)
NRBC # BLD: 0 /100 WBCS — SIGNIFICANT CHANGE UP (ref 0–0)
PLATELET # BLD AUTO: 314 K/UL — SIGNIFICANT CHANGE UP (ref 130–400)
POTASSIUM SERPL-MCNC: 4.7 MMOL/L — SIGNIFICANT CHANGE UP (ref 3.5–5)
POTASSIUM SERPL-SCNC: 4.7 MMOL/L — SIGNIFICANT CHANGE UP (ref 3.5–5)
RBC # BLD: 5.02 M/UL — SIGNIFICANT CHANGE UP (ref 4.7–6.1)
RBC # FLD: 26 % — HIGH (ref 11.5–14.5)
SODIUM SERPL-SCNC: 139 MMOL/L — SIGNIFICANT CHANGE UP (ref 135–146)
WBC # BLD: 10.48 K/UL — SIGNIFICANT CHANGE UP (ref 4.8–10.8)
WBC # FLD AUTO: 10.48 K/UL — SIGNIFICANT CHANGE UP (ref 4.8–10.8)

## 2020-03-10 PROCEDURE — 99232 SBSQ HOSP IP/OBS MODERATE 35: CPT

## 2020-03-10 RX ADMIN — NYSTATIN CREAM 1 APPLICATION(S): 100000 CREAM TOPICAL at 21:16

## 2020-03-10 RX ADMIN — HEPARIN SODIUM 5000 UNIT(S): 5000 INJECTION INTRAVENOUS; SUBCUTANEOUS at 21:17

## 2020-03-10 RX ADMIN — MIDODRINE HYDROCHLORIDE 5 MILLIGRAM(S): 2.5 TABLET ORAL at 05:17

## 2020-03-10 RX ADMIN — Medication 6 UNIT(S): at 08:03

## 2020-03-10 RX ADMIN — Medication 0.12 MILLIGRAM(S): at 05:17

## 2020-03-10 RX ADMIN — NYSTATIN CREAM 1 APPLICATION(S): 100000 CREAM TOPICAL at 14:43

## 2020-03-10 RX ADMIN — PANTOPRAZOLE SODIUM 40 MILLIGRAM(S): 20 TABLET, DELAYED RELEASE ORAL at 21:16

## 2020-03-10 RX ADMIN — Medication 1 APPLICATION(S): at 05:17

## 2020-03-10 RX ADMIN — TAMSULOSIN HYDROCHLORIDE 0.4 MILLIGRAM(S): 0.4 CAPSULE ORAL at 21:16

## 2020-03-10 RX ADMIN — DULOXETINE HYDROCHLORIDE 90 MILLIGRAM(S): 30 CAPSULE, DELAYED RELEASE ORAL at 12:04

## 2020-03-10 RX ADMIN — Medication 81 MILLIGRAM(S): at 12:03

## 2020-03-10 RX ADMIN — Medication 1 APPLICATION(S): at 18:23

## 2020-03-10 RX ADMIN — HEPARIN SODIUM 5000 UNIT(S): 5000 INJECTION INTRAVENOUS; SUBCUTANEOUS at 05:17

## 2020-03-10 RX ADMIN — PRASUGREL 10 MILLIGRAM(S): 5 TABLET, FILM COATED ORAL at 12:04

## 2020-03-10 RX ADMIN — Medication 6 UNIT(S): at 12:06

## 2020-03-10 RX ADMIN — CHLORHEXIDINE GLUCONATE 1 APPLICATION(S): 213 SOLUTION TOPICAL at 05:17

## 2020-03-10 RX ADMIN — MIDODRINE HYDROCHLORIDE 5 MILLIGRAM(S): 2.5 TABLET ORAL at 21:16

## 2020-03-10 RX ADMIN — HEPARIN SODIUM 5000 UNIT(S): 5000 INJECTION INTRAVENOUS; SUBCUTANEOUS at 14:44

## 2020-03-10 RX ADMIN — NYSTATIN CREAM 1 APPLICATION(S): 100000 CREAM TOPICAL at 05:16

## 2020-03-10 RX ADMIN — Medication 2: at 08:03

## 2020-03-10 RX ADMIN — MIDODRINE HYDROCHLORIDE 5 MILLIGRAM(S): 2.5 TABLET ORAL at 14:44

## 2020-03-10 RX ADMIN — ATORVASTATIN CALCIUM 40 MILLIGRAM(S): 80 TABLET, FILM COATED ORAL at 12:03

## 2020-03-10 RX ADMIN — Medication 50 MILLIGRAM(S): at 05:17

## 2020-03-10 RX ADMIN — INSULIN GLARGINE 20 UNIT(S): 100 INJECTION, SOLUTION SUBCUTANEOUS at 21:24

## 2020-03-10 RX ADMIN — Medication 6 UNIT(S): at 18:21

## 2020-03-10 RX ADMIN — Medication 4: at 18:22

## 2020-03-10 NOTE — PROGRESS NOTE ADULT - ATTENDING COMMENTS
Patient is clinically and hemodynamically stable now.   Await EP for AICD management.   PT and OOB to chair.   Need PT.   d/w Patient and explained
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
## 2020-03-10 NOTE — PROGRESS NOTE ADULT - SUBJECTIVE AND OBJECTIVE BOX
FLOWER MARISCAL 64y Male      Patient is a 64y old  Male who presents with a chief complaint of Altered mental status (09 Mar 2020 12:04)        INTERVAL HPI/OVERNIGHT EVENTS: No acute events overnight. Patient was seen and evaluated at the bedside. The patient denies pain. Vitals stable. Patient denies fever/chills, chest pain, shortness of breath, abdominal pain, headaches, nausea/vomiting, and diarrhea/constipation.      PHYSICAL EXAM:  GENERAL: NAD  HEAD:  Normocephalic  EYES:  conjunctiva and sclera clear  ENMT: Moist mucous membranes  NECK: Supple  NERVOUS SYSTEM:  Alert, awake  CHEST/LUNG: Good air exchange bilaterally, no wheeze  HEART: Regular rate and rhythm        Vital Signs Last 24 Hrs  T(C): 36.1 (10 Mar 2020 05:15), Max: 36.2 (09 Mar 2020 21:11)  T(F): 96.9 (10 Mar 2020 05:15), Max: 97.2 (09 Mar 2020 21:11)  HR: 79 (10 Mar 2020 05:15) (66 - 79)  BP: 122/70 (10 Mar 2020 05:15) (105/56 - 122/70)  BP(mean): --  RR: 18 (09 Mar 2020 21:11) (18 - 18)  SpO2: 98% (09 Mar 2020 21:11) (98% - 100%)      Consultant(s) Notes Reviewed:  [X] YES  [ ] NO  Care Discussed with Consultants/Other Providers [X] YES  [ ] NO  Imaging Personally Reviewed:  [X] YES  [ ] NO      LABS:                        10.4   9.43  )-----------( 317      ( 09 Mar 2020 06:18 )             34.4     03-09    139  |  101  |  17  ----------------------------<  163<H>  4.6   |  29  |  0.6<L>    Ca    8.1<L>      09 Mar 2020 06:18    TPro  5.5<L>  /  Alb  2.6<L>  /  TBili  0.7  /  DBili  x   /  AST  48<H>  /  ALT  45<H>  /  AlkPhos  195<H>  03-09          CAPILLARY BLOOD GLUCOSE      POCT Blood Glucose.: 171 mg/dL (10 Mar 2020 07:30)  POCT Blood Glucose.: 190 mg/dL (09 Mar 2020 21:22)  POCT Blood Glucose.: 197 mg/dL (09 Mar 2020 17:00)  POCT Blood Glucose.: 123 mg/dL (09 Mar 2020 11:33)

## 2020-03-11 LAB
ALBUMIN SERPL ELPH-MCNC: 3 G/DL — LOW (ref 3.5–5.2)
ALP SERPL-CCNC: 212 U/L — HIGH (ref 30–115)
ALT FLD-CCNC: 40 U/L — SIGNIFICANT CHANGE UP (ref 0–41)
ANION GAP SERPL CALC-SCNC: 11 MMOL/L — SIGNIFICANT CHANGE UP (ref 7–14)
AST SERPL-CCNC: 31 U/L — SIGNIFICANT CHANGE UP (ref 0–41)
BASOPHILS # BLD AUTO: 0.06 K/UL — SIGNIFICANT CHANGE UP (ref 0–0.2)
BASOPHILS NFR BLD AUTO: 0.5 % — SIGNIFICANT CHANGE UP (ref 0–1)
BILIRUB SERPL-MCNC: 0.9 MG/DL — SIGNIFICANT CHANGE UP (ref 0.2–1.2)
BUN SERPL-MCNC: 14 MG/DL — SIGNIFICANT CHANGE UP (ref 10–20)
CALCIUM SERPL-MCNC: 8.6 MG/DL — SIGNIFICANT CHANGE UP (ref 8.5–10.1)
CHLORIDE SERPL-SCNC: 97 MMOL/L — LOW (ref 98–110)
CO2 SERPL-SCNC: 29 MMOL/L — SIGNIFICANT CHANGE UP (ref 17–32)
CREAT SERPL-MCNC: 0.6 MG/DL — LOW (ref 0.7–1.5)
EOSINOPHIL # BLD AUTO: 0.16 K/UL — SIGNIFICANT CHANGE UP (ref 0–0.7)
EOSINOPHIL NFR BLD AUTO: 1.2 % — SIGNIFICANT CHANGE UP (ref 0–8)
GLUCOSE BLDC GLUCOMTR-MCNC: 129 MG/DL — HIGH (ref 70–99)
GLUCOSE BLDC GLUCOMTR-MCNC: 131 MG/DL — HIGH (ref 70–99)
GLUCOSE BLDC GLUCOMTR-MCNC: 217 MG/DL — HIGH (ref 70–99)
GLUCOSE SERPL-MCNC: 244 MG/DL — HIGH (ref 70–99)
HCT VFR BLD CALC: 38.6 % — LOW (ref 42–52)
HGB BLD-MCNC: 11.6 G/DL — LOW (ref 14–18)
IMM GRANULOCYTES NFR BLD AUTO: 0.5 % — HIGH (ref 0.1–0.3)
LYMPHOCYTES # BLD AUTO: 0.74 K/UL — LOW (ref 1.2–3.4)
LYMPHOCYTES # BLD AUTO: 5.7 % — LOW (ref 20.5–51.1)
MAGNESIUM SERPL-MCNC: 1.7 MG/DL — LOW (ref 1.8–2.4)
MCHC RBC-ENTMCNC: 22.6 PG — LOW (ref 27–31)
MCHC RBC-ENTMCNC: 30.1 G/DL — LOW (ref 32–37)
MCV RBC AUTO: 75.1 FL — LOW (ref 80–94)
MONOCYTES # BLD AUTO: 0.82 K/UL — HIGH (ref 0.1–0.6)
MONOCYTES NFR BLD AUTO: 6.4 % — SIGNIFICANT CHANGE UP (ref 1.7–9.3)
NEUTROPHILS # BLD AUTO: 11.06 K/UL — HIGH (ref 1.4–6.5)
NEUTROPHILS NFR BLD AUTO: 85.7 % — HIGH (ref 42.2–75.2)
NRBC # BLD: 0 /100 WBCS — SIGNIFICANT CHANGE UP (ref 0–0)
PLATELET # BLD AUTO: 328 K/UL — SIGNIFICANT CHANGE UP (ref 130–400)
POTASSIUM SERPL-MCNC: 5.7 MMOL/L — HIGH (ref 3.5–5)
POTASSIUM SERPL-SCNC: 5.7 MMOL/L — HIGH (ref 3.5–5)
PROT SERPL-MCNC: 6.1 G/DL — SIGNIFICANT CHANGE UP (ref 6–8)
RBC # BLD: 5.14 M/UL — SIGNIFICANT CHANGE UP (ref 4.7–6.1)
RBC # FLD: 26.2 % — HIGH (ref 11.5–14.5)
SODIUM SERPL-SCNC: 137 MMOL/L — SIGNIFICANT CHANGE UP (ref 135–146)
WBC # BLD: 12.9 K/UL — HIGH (ref 4.8–10.8)
WBC # FLD AUTO: 12.9 K/UL — HIGH (ref 4.8–10.8)

## 2020-03-11 PROCEDURE — 99232 SBSQ HOSP IP/OBS MODERATE 35: CPT

## 2020-03-11 RX ADMIN — ATORVASTATIN CALCIUM 40 MILLIGRAM(S): 80 TABLET, FILM COATED ORAL at 12:24

## 2020-03-11 RX ADMIN — Medication 6 UNIT(S): at 12:24

## 2020-03-11 RX ADMIN — PANTOPRAZOLE SODIUM 40 MILLIGRAM(S): 20 TABLET, DELAYED RELEASE ORAL at 21:44

## 2020-03-11 RX ADMIN — DULOXETINE HYDROCHLORIDE 90 MILLIGRAM(S): 30 CAPSULE, DELAYED RELEASE ORAL at 12:24

## 2020-03-11 RX ADMIN — Medication 4: at 08:16

## 2020-03-11 RX ADMIN — NYSTATIN CREAM 1 APPLICATION(S): 100000 CREAM TOPICAL at 22:27

## 2020-03-11 RX ADMIN — Medication 6 UNIT(S): at 17:10

## 2020-03-11 RX ADMIN — PRASUGREL 10 MILLIGRAM(S): 5 TABLET, FILM COATED ORAL at 12:24

## 2020-03-11 RX ADMIN — NYSTATIN CREAM 1 APPLICATION(S): 100000 CREAM TOPICAL at 05:05

## 2020-03-11 RX ADMIN — NYSTATIN CREAM 1 APPLICATION(S): 100000 CREAM TOPICAL at 14:25

## 2020-03-11 RX ADMIN — Medication 50 MILLIGRAM(S): at 06:13

## 2020-03-11 RX ADMIN — MIDODRINE HYDROCHLORIDE 5 MILLIGRAM(S): 2.5 TABLET ORAL at 21:44

## 2020-03-11 RX ADMIN — HEPARIN SODIUM 5000 UNIT(S): 5000 INJECTION INTRAVENOUS; SUBCUTANEOUS at 06:13

## 2020-03-11 RX ADMIN — Medication 650 MILLIGRAM(S): at 16:36

## 2020-03-11 RX ADMIN — HEPARIN SODIUM 5000 UNIT(S): 5000 INJECTION INTRAVENOUS; SUBCUTANEOUS at 21:44

## 2020-03-11 RX ADMIN — Medication 650 MILLIGRAM(S): at 17:11

## 2020-03-11 RX ADMIN — Medication 6 UNIT(S): at 08:16

## 2020-03-11 RX ADMIN — HEPARIN SODIUM 5000 UNIT(S): 5000 INJECTION INTRAVENOUS; SUBCUTANEOUS at 14:26

## 2020-03-11 RX ADMIN — Medication 81 MILLIGRAM(S): at 12:24

## 2020-03-11 RX ADMIN — MIDODRINE HYDROCHLORIDE 5 MILLIGRAM(S): 2.5 TABLET ORAL at 14:26

## 2020-03-11 RX ADMIN — TAMSULOSIN HYDROCHLORIDE 0.4 MILLIGRAM(S): 0.4 CAPSULE ORAL at 21:44

## 2020-03-11 RX ADMIN — MIDODRINE HYDROCHLORIDE 5 MILLIGRAM(S): 2.5 TABLET ORAL at 06:13

## 2020-03-11 RX ADMIN — Medication 0.12 MILLIGRAM(S): at 06:13

## 2020-03-11 RX ADMIN — Medication 650 MILLIGRAM(S): at 08:15

## 2020-03-11 RX ADMIN — Medication 1 APPLICATION(S): at 17:11

## 2020-03-11 NOTE — PROGRESS NOTE ADULT - ASSESSMENT
63yo M with a PMHx of HTN, DLD, CAD s/p CABG, HFrEF (25-30% on ECHO 2/2020, 20-25% on MUGA scan in 12/2019) s/p [facility] BiV placement 2/2020, DM (on insulin pump), pulmonary hypertension, Celiac disease, diverticulitis, s/p right total knee replacement October 2019 complicated by infection s/p abx course w/ PICC and  right thigh with skin graft, right hip fracture s/p ORIF 2/2020  admitted for metabolic encephalopathy/ found to have UTI, course complicated by severe sepsis secondary to cholecystitis 2/2 cholelithiasis.    # Chronic Heart failure with reduced ejection fraction  - BiV AICD malfunctioning, on tele, awaiting EP intervention    - MUGA scan with EF of 20-25%   - Cont digoxin 0.125mg PO QD  - EP to follow-up. Suspect LV lead malfunction. Will attempt to transfer to , EP to discuss with CT Surg    #Sepsis secondary to acute cholecystitis, resolved  - Hydropic gallbladder with stones and sludge, s/p percutaneous drainage  - Will follow-up with surgery or IR team as to pull when the drain when output becomes minimal    # CAD s/p CABG and PCI, stable  - Continue with DAPT, Atorvastatin, and Metoprolol     # Celiac disease  - Patient is asymptomatic at this time  - Gluten-restricted diet modifier   - Follow outpatient with GI    # Diabetes mellitus type 2 with diabetic neuropathy  - Patient removed his insulin pump during last hospitalization, continue with insulin      Hypertension: Metoprolol decreased   Anxiety: Continue cymbalta, holding xanax   Skin graft of right knee wound by burn: Wound care per burn recommendation. Cleared for WBAT. follow-up PT.  Orthostatic hypotension, continue midodrine  Sacral fungal rash: nystatin topical powder tid    # Physical debility  - Pending STR/SNF placement       #Progress Note Handoff  Pending (specify): AICD interrogation // Placement   Family discussion: plan of care discussed with patient as above  Disposition: SNF 63yo M with a PMHx of HTN, DLD, CAD s/p CABG, HFrEF (25-30% on ECHO 2/2020, 20-25% on MUGA scan in 12/2019) s/p [facility] BiV placement 2/2020, DM (on insulin pump), pulmonary hypertension, Celiac disease, diverticulitis, s/p right total knee replacement October 2019 complicated by infection s/p abx course w/ PICC and  right thigh with skin graft, right hip fracture s/p ORIF 2/2020  admitted for metabolic encephalopathy/ found to have UTI, course complicated by severe sepsis secondary to cholecystitis 2/2 cholelithiasis.    # Chronic systolic Heart failure with reduced ejection fraction  - BiV AICD malfunctioning, on tele, awaiting EP intervention    - MUGA scan with EF of 20-25%   - Cont digoxin 0.125mg PO QD  - EP to follow-up. Suspect LV lead malfunction. Will attempt to transfer to , EP to discuss with CT Surg. patient refusing to leave this room. counseled the need of telemetry eval and monitoring     #Sepsis secondary to acute cholecystitis, resolved  - Hydropic gallbladder with stones and sludge, s/p percutaneous drainage  - Will follow-up with surgery or IR team as to pull when the drain when output becomes minimal    # CAD s/p CABG and PCI, stable  - Continue with DAPT, Atorvastatin, and Metoprolol     # Celiac disease  - Patient is asymptomatic at this time  - Gluten-restricted diet modifier   - Follow outpatient with GI    # Diabetes mellitus type 2 with diabetic neuropathy  - Patient removed his insulin pump during last hospitalization, continue with insulin      Hypertension: Metoprolol decreased   Anxiety: Continue cymbalta, holding xanax   Skin graft of right knee wound by burn: Wound care per burn recommendation. Cleared for WBAT. follow-up PT.  Orthostatic hypotension, continue midodrine  Sacral fungal rash: nystatin topical powder tid    # Physical debility  - Pending STR/SNF placement  .  - Patient has been deconditioned 65yo M with a PMHx of HTN, DLD, CAD s/p CABG, HFrEF (25-30% on ECHO 2/2020, 20-25% on MUGA scan in 12/2019) s/p st [facility] BiV placement 2/2020, DM (on insulin pump), pulmonary hypertension, Celiac disease, diverticulitis, s/p right total knee replacement October 2019 complicated by infection s/p abx course w/ PICC and  right thigh with skin graft, right hip fracture s/p ORIF 2/2020  admitted for metabolic encephalopathy/ found to have UTI, course complicated by severe sepsis secondary to cholecystitis 2/2 cholelithiasis.    # Chronic systolic Heart failure with reduced ejection fraction  - BiV AICD malfunctioning, on tele, awaiting EP intervention    - MUGA scan with EF of 20-25%   - Cont digoxin 0.125mg PO QD  - EP to follow-up. Suspect LV lead malfunction. Will attempt to transfer to , EP to discuss with CT Surg. patient refusing to leave this room. counseled the need of telemetry eval and monitoring. Will hold effient and keep npo after midnight for possible EP procedure tomorrow     #Sepsis secondary to acute cholecystitis, resolved  - Hydropic gallbladder with stones and sludge, s/p percutaneous drainage  - Will follow-up with surgery or IR team as to pull when the drain when output becomes minimal    # CAD s/p CABG and PCI, stable  - Continue with DAPT, Atorvastatin, and Metoprolol     # Celiac disease  - Patient is asymptomatic at this time  - Gluten-restricted diet modifier   - Follow outpatient with GI    # Diabetes mellitus type 2 with diabetic neuropathy  - Patient removed his insulin pump during last hospitalization, continue with insulin      Hypertension: Metoprolol decreased   Anxiety: Continue cymbalta, holding xanax   Skin graft of right knee wound by burn: Wound care per burn recommendation. Cleared for WBAT. follow-up PT.  Orthostatic hypotension, continue midodrine  Sacral fungal rash: nystatin topical powder tid    # Physical debility  - Pending STR/SNF placement  .  - Patient has been deconditioned

## 2020-03-11 NOTE — PROGRESS NOTE ADULT - SUBJECTIVE AND OBJECTIVE BOX
FLOWER MARISCAL 64y Male      Patient is a 64y old  Male who presents with a chief complaint of Altered mental status (10 Mar 2020 08:00)        INTERVAL HPI/OVERNIGHT EVENTS: No acute events overnight. Patient was seen and evaluated at the bedside. The patient denies pain. Vitals stable. Patient denies fever/chills, chest pain, shortness of breath, abdominal pain, headaches, nausea/vomiting, and diarrhea/constipation.      PHYSICAL EXAM:  GENERAL: NAD  HEAD:  Normocephalic  EYES:  conjunctiva and sclera clear  ENMT: Moist mucous membranes  NECK: Supple  NERVOUS SYSTEM:  Alert, awake  CHEST/LUNG: Good air exchange bilaterally, no wheeze  HEART: Regular rate and rhythm        Vital Signs Last 24 Hrs  T(C): 36.9 (11 Mar 2020 06:13), Max: 37 (10 Mar 2020 21:55)  T(F): 98.5 (11 Mar 2020 06:13), Max: 98.6 (10 Mar 2020 21:55)  HR: 94 (11 Mar 2020 06:13) (65 - 94)  BP: 130/70 (11 Mar 2020 06:13) (100/54 - 130/70)  BP(mean): 69 (10 Mar 2020 14:20) (69 - 69)  RR: 19 (10 Mar 2020 21:55) (18 - 19)  SpO2: 97% (10 Mar 2020 21:55) (97% - 99%)      Consultant(s) Notes Reviewed:  [X] YES  [ ] NO  Care Discussed with Consultants/Other Providers [X] YES  [ ] NO  Imaging Personally Reviewed:  [X] YES  [ ] NO      LABS:                        11.6   12.90 )-----------( 328      ( 11 Mar 2020 06:55 )             38.6     03-11    137  |  97<L>  |  14  ----------------------------<  244<H>  5.7<H>   |  29  |  0.6<L>    Ca    8.6      11 Mar 2020 06:55  Mg     1.7     03-11    TPro  6.1  /  Alb  3.0<L>  /  TBili  0.9  /  DBili  x   /  AST  31  /  ALT  40  /  AlkPhos  212<H>  03-11          CAPILLARY BLOOD GLUCOSE      POCT Blood Glucose.: 131 mg/dL (11 Mar 2020 11:35)  POCT Blood Glucose.: 217 mg/dL (11 Mar 2020 08:07)  POCT Blood Glucose.: 232 mg/dL (10 Mar 2020 21:05)  POCT Blood Glucose.: 220 mg/dL (10 Mar 2020 17:17)

## 2020-03-11 NOTE — PROGRESS NOTE ADULT - ATTENDING COMMENTS
Await EP eval for the AICD. Malfunctioning.   Possible SNF to d/c   d/w patient along with residents and interns , explained the process

## 2020-03-12 ENCOUNTER — RX RENEWAL (OUTPATIENT)
Age: 65
End: 2020-03-12

## 2020-03-12 LAB
ALBUMIN SERPL ELPH-MCNC: 2.8 G/DL — LOW (ref 3.5–5.2)
ALP SERPL-CCNC: 185 U/L — HIGH (ref 30–115)
ALT FLD-CCNC: 37 U/L — SIGNIFICANT CHANGE UP (ref 0–41)
ANION GAP SERPL CALC-SCNC: 8 MMOL/L — SIGNIFICANT CHANGE UP (ref 7–14)
AST SERPL-CCNC: 34 U/L — SIGNIFICANT CHANGE UP (ref 0–41)
BASOPHILS # BLD AUTO: 0.07 K/UL — SIGNIFICANT CHANGE UP (ref 0–0.2)
BASOPHILS NFR BLD AUTO: 0.9 % — SIGNIFICANT CHANGE UP (ref 0–1)
BILIRUB SERPL-MCNC: 0.9 MG/DL — SIGNIFICANT CHANGE UP (ref 0.2–1.2)
BUN SERPL-MCNC: 11 MG/DL — SIGNIFICANT CHANGE UP (ref 10–20)
CALCIUM SERPL-MCNC: 8.5 MG/DL — SIGNIFICANT CHANGE UP (ref 8.5–10.1)
CHLORIDE SERPL-SCNC: 95 MMOL/L — LOW (ref 98–110)
CO2 SERPL-SCNC: 31 MMOL/L — SIGNIFICANT CHANGE UP (ref 17–32)
CREAT SERPL-MCNC: 0.6 MG/DL — LOW (ref 0.7–1.5)
EOSINOPHIL # BLD AUTO: 0.23 K/UL — SIGNIFICANT CHANGE UP (ref 0–0.7)
EOSINOPHIL NFR BLD AUTO: 2.8 % — SIGNIFICANT CHANGE UP (ref 0–8)
GLUCOSE BLDC GLUCOMTR-MCNC: 142 MG/DL — HIGH (ref 70–99)
GLUCOSE BLDC GLUCOMTR-MCNC: 145 MG/DL — HIGH (ref 70–99)
GLUCOSE BLDC GLUCOMTR-MCNC: 150 MG/DL — HIGH (ref 70–99)
GLUCOSE BLDC GLUCOMTR-MCNC: 161 MG/DL — HIGH (ref 70–99)
GLUCOSE BLDC GLUCOMTR-MCNC: 204 MG/DL — HIGH (ref 70–99)
GLUCOSE SERPL-MCNC: 156 MG/DL — HIGH (ref 70–99)
HCT VFR BLD CALC: 36.5 % — LOW (ref 42–52)
HGB BLD-MCNC: 11.3 G/DL — LOW (ref 14–18)
IMM GRANULOCYTES NFR BLD AUTO: 0.2 % — SIGNIFICANT CHANGE UP (ref 0.1–0.3)
LYMPHOCYTES # BLD AUTO: 0.98 K/UL — LOW (ref 1.2–3.4)
LYMPHOCYTES # BLD AUTO: 12 % — LOW (ref 20.5–51.1)
MAGNESIUM SERPL-MCNC: 1.6 MG/DL — LOW (ref 1.8–2.4)
MCHC RBC-ENTMCNC: 23.2 PG — LOW (ref 27–31)
MCHC RBC-ENTMCNC: 31 G/DL — LOW (ref 32–37)
MCV RBC AUTO: 74.9 FL — LOW (ref 80–94)
MONOCYTES # BLD AUTO: 0.55 K/UL — SIGNIFICANT CHANGE UP (ref 0.1–0.6)
MONOCYTES NFR BLD AUTO: 6.7 % — SIGNIFICANT CHANGE UP (ref 1.7–9.3)
NEUTROPHILS # BLD AUTO: 6.33 K/UL — SIGNIFICANT CHANGE UP (ref 1.4–6.5)
NEUTROPHILS NFR BLD AUTO: 77.4 % — HIGH (ref 42.2–75.2)
NRBC # BLD: 0 /100 WBCS — SIGNIFICANT CHANGE UP (ref 0–0)
PLATELET # BLD AUTO: 323 K/UL — SIGNIFICANT CHANGE UP (ref 130–400)
POTASSIUM SERPL-MCNC: 4.8 MMOL/L — SIGNIFICANT CHANGE UP (ref 3.5–5)
POTASSIUM SERPL-SCNC: 4.8 MMOL/L — SIGNIFICANT CHANGE UP (ref 3.5–5)
PROT SERPL-MCNC: 5.8 G/DL — LOW (ref 6–8)
RBC # BLD: 4.87 M/UL — SIGNIFICANT CHANGE UP (ref 4.7–6.1)
RBC # FLD: 26.4 % — HIGH (ref 11.5–14.5)
SODIUM SERPL-SCNC: 134 MMOL/L — LOW (ref 135–146)
WBC # BLD: 8.18 K/UL — SIGNIFICANT CHANGE UP (ref 4.8–10.8)
WBC # FLD AUTO: 8.18 K/UL — SIGNIFICANT CHANGE UP (ref 4.8–10.8)

## 2020-03-12 PROCEDURE — 99232 SBSQ HOSP IP/OBS MODERATE 35: CPT

## 2020-03-12 PROCEDURE — 99497 ADVNCD CARE PLAN 30 MIN: CPT

## 2020-03-12 RX ORDER — PRASUGREL 5 MG/1
10 TABLET, FILM COATED ORAL DAILY
Refills: 0 | Status: COMPLETED | OUTPATIENT
Start: 2020-03-12 | End: 2020-03-12

## 2020-03-12 RX ADMIN — Medication 0.12 MILLIGRAM(S): at 06:18

## 2020-03-12 RX ADMIN — ATORVASTATIN CALCIUM 40 MILLIGRAM(S): 80 TABLET, FILM COATED ORAL at 12:49

## 2020-03-12 RX ADMIN — HEPARIN SODIUM 5000 UNIT(S): 5000 INJECTION INTRAVENOUS; SUBCUTANEOUS at 21:59

## 2020-03-12 RX ADMIN — Medication 6 UNIT(S): at 17:00

## 2020-03-12 RX ADMIN — Medication 81 MILLIGRAM(S): at 12:49

## 2020-03-12 RX ADMIN — Medication 50 MILLIGRAM(S): at 06:18

## 2020-03-12 RX ADMIN — MIDODRINE HYDROCHLORIDE 5 MILLIGRAM(S): 2.5 TABLET ORAL at 21:58

## 2020-03-12 RX ADMIN — Medication 6 UNIT(S): at 11:50

## 2020-03-12 RX ADMIN — INSULIN GLARGINE 20 UNIT(S): 100 INJECTION, SOLUTION SUBCUTANEOUS at 21:59

## 2020-03-12 RX ADMIN — Medication 1 APPLICATION(S): at 06:23

## 2020-03-12 RX ADMIN — NYSTATIN CREAM 1 APPLICATION(S): 100000 CREAM TOPICAL at 21:58

## 2020-03-12 RX ADMIN — Medication 1 APPLICATION(S): at 17:00

## 2020-03-12 RX ADMIN — NYSTATIN CREAM 1 APPLICATION(S): 100000 CREAM TOPICAL at 06:23

## 2020-03-12 RX ADMIN — HEPARIN SODIUM 5000 UNIT(S): 5000 INJECTION INTRAVENOUS; SUBCUTANEOUS at 06:18

## 2020-03-12 RX ADMIN — MIDODRINE HYDROCHLORIDE 5 MILLIGRAM(S): 2.5 TABLET ORAL at 06:18

## 2020-03-12 RX ADMIN — CHLORHEXIDINE GLUCONATE 1 APPLICATION(S): 213 SOLUTION TOPICAL at 06:24

## 2020-03-12 RX ADMIN — TAMSULOSIN HYDROCHLORIDE 0.4 MILLIGRAM(S): 0.4 CAPSULE ORAL at 21:57

## 2020-03-12 RX ADMIN — DULOXETINE HYDROCHLORIDE 90 MILLIGRAM(S): 30 CAPSULE, DELAYED RELEASE ORAL at 12:49

## 2020-03-12 RX ADMIN — Medication 2: at 11:50

## 2020-03-12 RX ADMIN — NYSTATIN CREAM 1 APPLICATION(S): 100000 CREAM TOPICAL at 13:26

## 2020-03-12 RX ADMIN — MIDODRINE HYDROCHLORIDE 5 MILLIGRAM(S): 2.5 TABLET ORAL at 15:04

## 2020-03-12 RX ADMIN — HEPARIN SODIUM 5000 UNIT(S): 5000 INJECTION INTRAVENOUS; SUBCUTANEOUS at 13:26

## 2020-03-12 RX ADMIN — PANTOPRAZOLE SODIUM 40 MILLIGRAM(S): 20 TABLET, DELAYED RELEASE ORAL at 21:57

## 2020-03-12 NOTE — PROGRESS NOTE ADULT - ASSESSMENT
63yo M with a PMHx of HTN, DLD, CAD s/p CABG, HFrEF (25-30% on ECHO 2/2020, 20-25% on MUGA scan in 12/2019) s/p st [facility] BiV placement 2/2020, DM (on insulin pump), pulmonary hypertension, Celiac disease, diverticulitis, s/p right total knee replacement October 2019 complicated by infection s/p abx course w/ PICC and  right thigh with skin graft, right hip fracture s/p ORIF 2/2020  admitted for metabolic encephalopathy/ found to have UTI, course complicated by severe sepsis secondary to cholecystitis 2/2 cholelithiasis.    # Chronic systolic Heart failure with reduced ejection fraction  - BiV AICD malfunctioning, on tele, awaiting EP intervention    - MUGA scan with EF of 20-25%   - Cont digoxin 0.125mg PO QD  - EP to follow-up. Suspect LV lead malfunction. Will attempt to transfer to , EP to discuss with CT Surg. patient refusing to leave this room. counseled the need of telemetry eval and monitoring. Will hold effient and keep npo after midnight for possible EP procedure tomorrow    #Sepsis secondary to acute cholecystitis, resolved  - Hydropic gallbladder with stones and sludge, s/p percutaneous drainage  - Will follow-up with surgery or IR team as to pull when the drain when output becomes minimal    # CAD s/p CABG and PCI, stable  - Continue with DAPT, Atorvastatin, and Metoprolol     # Celiac disease  - Patient is asymptomatic at this time  - Gluten-restricted diet modifier   - Follow outpatient with GI    # Diabetes mellitus type 2 with diabetic neuropathy  - Patient removed his insulin pump during last hospitalization, continue with insulin      Hypertension: Metoprolol decreased   Anxiety: Continue cymbalta, holding xanax   Skin graft of right knee wound by burn: Wound care per burn recommendation. Cleared for WBAT. follow-up PT.  Orthostatic hypotension, continue midodrine  Sacral fungal rash: nystatin topical powder tid    # Physical debility  - Pending STR/SNF placement  .  - Patient has been deconditioned 65yo M with a PMHx of HTN, DLD, CAD s/p CABG, HFrEF (25-30% on ECHO 2/2020, 20-25% on MUGA scan in 12/2019) s/p [facility] BiV placement 2/2020, DM (on insulin pump), pulmonary hypertension, Celiac disease, diverticulitis, s/p right total knee replacement October 2019 complicated by infection s/p abx course w/ PICC and  right thigh with skin graft, right hip fracture s/p ORIF 2/2020  admitted for metabolic encephalopathy/ found to have UTI, course complicated by severe sepsis secondary to cholecystitis 2/2 cholelithiasis.    # Chronic systolic Heart failure with reduced ejection fraction  - BiV AICD malfunctioning, on tele, awaiting EP intervention    - MUGA scan with EF of 20-25%   - Cont digoxin 0.125mg PO QD  - EP to follow-up. Suspect LV lead malfunction. Will attempt to transfer to , EP to discuss with CT Surg. patient refusing to leave this room. counseled the need of telemetry eval and monitoring.   - Holding Effient and kept NPO for possible EP procedure today    #Sepsis secondary to acute cholecystitis, resolved  - Hydropic gallbladder with stones and sludge, s/p percutaneous drainage  - Will follow-up with surgery or IR team as to pull when the drain when output becomes minimal    # CAD s/p CABG and PCI, stable  - Continue with DAPT, Atorvastatin, and Metoprolol     # Celiac disease  - Patient is asymptomatic at this time  - Gluten-restricted diet modifier   - Follow outpatient with GI    # Diabetes mellitus type 2 with diabetic neuropathy  - Patient removed his insulin pump during last hospitalization, continue with insulin      Hypertension: Metoprolol decreased   Anxiety: Continue cymbalta, holding xanax   Skin graft of right knee wound by burn: Wound care per burn recommendation. Cleared for WBAT. follow-up PT.  Orthostatic hypotension, continue midodrine  Sacral fungal rash: nystatin topical powder tid    # Physical debility  - Pending STR/SNF placement  .  - Patient has been deconditioned 63yo M with a PMHx of HTN, DLD, CAD s/p CABG, HFrEF (25-30% on ECHO 2/2020, 20-25% on MUGA scan in 12/2019) s/p [facility] BiV placement 2/2020, DM (on insulin pump), pulmonary hypertension, Celiac disease, diverticulitis, s/p right total knee replacement October 2019 complicated by infection s/p abx course w/ PICC and  right thigh with skin graft, right hip fracture s/p ORIF 2/2020  admitted for metabolic encephalopathy/ found to have UTI, course complicated by severe sepsis secondary to cholecystitis 2/2 cholelithiasis.    # Chronic systolic Heart failure with reduced ejection fraction  - BiV AICD malfunctioning, on tele, awaiting EP intervention    - MUGA scan with EF of 20-25%   - Cont digoxin 0.125mg PO QD  - EP to follow-up. Suspect LV lead malfunction. Follow-up with [facility] for recommendations for lead replacement, possibly tomorrow.     #Sepsis secondary to acute cholecystitis, resolved  - Hydropic gallbladder with stones and sludge, s/p percutaneous drainage  - Will follow-up with surgery or IR team as to pull when the drain when output becomes minimal    # CAD s/p CABG and PCI, stable  - Continue with DAPT, Atorvastatin, and Metoprolol     # Celiac disease  - Patient is asymptomatic at this time  - Gluten-restricted diet modifier   - Follow outpatient with GI    # Diabetes mellitus type 2 with diabetic neuropathy  - Patient removed his insulin pump during last hospitalization, continue with insulin      Hypertension: Metoprolol decreased   Anxiety: Continue cymbalta, holding xanax   Skin graft of right knee wound by burn: Wound care per burn recommendation. Cleared for WBAT. follow-up PT.  Orthostatic hypotension, continue midodrine  Sacral fungal rash: nystatin topical powder tid    # Physical debility  - Pending STR/SNF placement  .  - Patient has been deconditioned

## 2020-03-12 NOTE — CONSULT NOTE ADULT - SUBJECTIVE AND OBJECTIVE BOX
Surgeon: Dr. Fernandes    Consult requesting by: Dr. Mccain    HISTORY OF PRESENT ILLNESS:   64 y M PMHx HTN, DLD, CAD s/p CABG, HFrEF (25-30% on ECHO 2/2020, 20-25% on MUGA scan in 12/2019) s/p [facility] BiV placement 2/11/2020, DM (on insulin pump), pulmonary hypertension, Celiac disease, diverticulitis, s/p right total knee replacement October 2019 complicated by infection s/p abx course w/ PICC and  right thigh with skin graft, right hip fracture s/p ORIF 2/2020  admitted for metabolic encephalopathy/ found to have UTI, course complicated by severe sepsis secondary to cholecystitis 2/2 cholelithiasis. ICD interrogated and poor thresholds, BiV pacing not optimized. CT surgery consulted for LV lead revision.      PAST MEDICAL & SURGICAL HISTORY:  Diabetic neuropathy  STEMI (ST elevation myocardial infarction)  Diverticulitis  MRSA bacteremia  History of celiac disease  CHF (congestive heart failure): EF ~ 25%  HTN (hypertension)  Diabetes: on insulin pump  Blood clot due to device, implant, or graft: was on blood thinners  HLD (hyperlipidemia)  Osteoarthritis  Atherosclerosis of coronary artery: CAD (coronary artery disease)  Status post percutaneous transluminal coronary angioplasty: in 2012  History of open reduction and internal fixation (ORIF) procedure  Surgery, elective: Right shoulder  Surgery, elective: right knee wound debridement  S/P TKR (total knee replacement), right: with infection Mrsa   per pt he was cleared from MRSA infection  S/P CABG x 1: 2018  Stented coronary artery: 10/18 heart attack  INFERIOR WALL MI  Other postprocedural status: Fixation hardware in foot LEFT      MEDICATIONS  (STANDING):  aspirin enteric coated 81 milliGRAM(s) Oral daily  atorvastatin Oral Tab/Cap - Peds 40 milliGRAM(s) Oral daily  BACItracin   Ointment 1 Application(s) Topical two times a day  chlorhexidine 4% Liquid 1 Application(s) Topical <User Schedule>  dextrose 5%. 1000 milliLiter(s) (50 mL/Hr) IV Continuous <Continuous>  dextrose 50% Injectable 12.5 Gram(s) IV Push once  dextrose 50% Injectable 25 Gram(s) IV Push once  dextrose 50% Injectable 25 Gram(s) IV Push once  digoxin     Tablet 0.125 milliGRAM(s) Oral daily  DULoxetine 90 milliGRAM(s) Oral daily  heparin  Injectable 5000 Unit(s) SubCutaneous every 8 hours  insulin glargine Injectable (LANTUS) 20 Unit(s) SubCutaneous at bedtime  insulin lispro (HumaLOG) corrective regimen sliding scale   SubCutaneous three times a day before meals  insulin lispro Injectable (HumaLOG) 6 Unit(s) SubCutaneous three times a day before meals  metoprolol succinate ER 50 milliGRAM(s) Oral daily  midodrine. 5 milliGRAM(s) Oral every 8 hours  nystatin Powder 1 Application(s) Topical every 8 hours  pantoprazole    Tablet 40 milliGRAM(s) Oral at bedtime  tamsulosin Oral Tab/Cap - Peds 0.4 milliGRAM(s) Oral at bedtime    MEDICATIONS  (PRN):  acetaminophen   Tablet .. 650 milliGRAM(s) Oral every 6 hours PRN Temp greater or equal to 38C (100.4F), Moderate Pain (4 - 6)  aluminum hydroxide/magnesium hydroxide/simethicone Suspension 30 milliLiter(s) Oral every 6 hours PRN Dyspepsia  dextrose 40% Gel 15 Gram(s) Oral once PRN Blood Glucose LESS THAN 70 milliGRAM(s)/deciliter  glucagon  Injectable 1 milliGRAM(s) IntraMuscular once PRN Glucose LESS THAN 70 milligrams/deciliter  ondansetron Injectable 4 milliGRAM(s) IV Push once PRN Nausea and/or Vomiting        Allergies    ACE inhibitors (Hives)  enalapril (Hives)    Intolerances        PHYSICAL EXAM  Vital Signs Last 24 Hrs  T(C): 35.9 (12 Mar 2020 13:42), Max: 36.6 (12 Mar 2020 06:45)  T(F): 96.7 (12 Mar 2020 13:42), Max: 97.9 (12 Mar 2020 06:45)  HR: 60 (12 Mar 2020 13:42) (60 - 79)  BP: 102/59 (12 Mar 2020 13:42) (102/59 - 117/70)  BP(mean): 77 (12 Mar 2020 13:42) (77 - 77)  RR: 18 (12 Mar 2020 13:42) (18 - 18)      Physical Exam  GENERAL: In no apparent distress, well nourished, and hydrated.  HEART: Regular rate and rhythm; No murmurs, rubs, or gallops.  PULMONARY: Clear to auscultation and perfusion.  No rales, wheezing, or rhonchi bilaterally.  ABDOMEN: Soft, Nontender, Nondistended; Bowel sounds present  EXTREMITIES:  2+ Peripheral Pulses, No clubbing, cyanosis, or edema  NEUROLOGICAL: Grossly nonfocal  Surgical incision with overlying scab and erythema. No fluctuance or  discharge expressed from pocket.                                                          LABS:                        11.3   8.18  )-----------( 323      ( 12 Mar 2020 08:50 )             36.5     03-12    134<L>  |  95<L>  |  11  ----------------------------<  156<H>  4.8   |  31  |  0.6<L>    Ca    8.5      12 Mar 2020 08:50  Mg     1.6     03-12    TPro  5.8<L>  /  Alb  2.8<L>  /  TBili  0.9  /  DBili  x   /  AST  34  /  ALT  37  /  AlkPhos  185<H>  03-12    RADIOLOGY & ADDITIONAL TESTS:  < from: Transthoracic Echocardiogram (02.13.20 @ 14:37) >  Summary:   1. Left ventricular ejection fraction, by visual estimation, is 25 to 30%.   2. Severely decreased segmental left ventricular systolic function.   3. Moderate to severely increased left ventricular internal cavity size.   4. Mild to moderate mitral valve regurgitation.   5. Mild-moderate tricuspid regurgitation.   6. Mild aortic regurgitation.   7. Estimated pulmonary artery systolic pressure is 51.8 mmHg assuming a right atrial pressure of 15 mmHg, whichis consistent with moderate pulmonary hypertension.    PHYSICIAN INTERPRETATION:  Left Ventricle: The left ventricular internal cavity size is moderate to severely increased. Left ventricular wall thickness is normal. Left ventricular ejection fraction, by visual estimation, is 25 to 30%. Severely decreased segmental left ventricular systolic function.  Right Ventricle: Normal right ventricular size and function.  Left Atrium: Left atrial enlargement.  Right Atrium: Right atrial enlargement.  Pericardium: There is no evidence of pericardial effusion.  Mitral Valve: No evidence of mitral stenosis. Mild to moderate mitral valve regurgitation is seen.  Tricuspid Valve: Mild-moderate tricuspid regurgitation is visualized. Estimated pulmonary artery systolic pressure is 51.8 mmHg assuming a right atrial pressure of 15 mmHg, which is consistent with moderate pulmonary hypertension.  Aortic Valve: No evidence of aortic stenosis. Aortic valve thickening with normal leaflet opening. Mild aortic valve regurgitation is seen.  Pulmonic Valve: Trace pulmonic valve regurgitation.  Aorta: The aortic root is normal in size and structure.  Venous: The inferior vena cava was dilated, with respiratory size variation less than 50%, consistent with elevated right atrial pressure.  Additional Comments: A pacer wire is visualized in the right atrium and right ventricle.    < end of copied text >    Assessment/ Plan: 64 y M PMHx HTN, DLD, CAD s/p CABG, HFrEF (25-30% on ECHO 2/2020, 20-25% on MUGA scan in 12/2019) s/p st carlyle BiV placement 2/11/2020, DM (on insulin pump), pulmonary hypertension, Celiac disease, diverticulitis, s/p right total knee replacement October 2019 complicated by infection s/p abx course w/ PICC and  right thigh with skin graft, right hip fracture s/p ORIF 2/2020  admitted for metabolic encephalopathy/ found to have UTI, course complicated by severe sepsis secondary to cholecystitis 2/2 cholelithiasis. ICD interrogated and poor thresholds, BiV pacing not optimized. CT surgery consulted for LV lead revision.    -Cases and plan discussed with CT surgeon Dr. Fernandes. Evaluation by full heart team pending. Attending note to follow. Pre-op for: LV lead revision.   -NPO after midnight  -Continue to hold Effient

## 2020-03-12 NOTE — PROGRESS NOTE ADULT - SUBJECTIVE AND OBJECTIVE BOX
FLOWER MARISCAL 64y Male      Patient is a 64y old  Male who presents with a chief complaint of Altered mental status (11 Mar 2020 11:39)        INTERVAL HPI/OVERNIGHT EVENTS: No acute events overnight. Patient was seen and evaluated at the bedside. The patient denies pain. Vitals stable. Patient denies fever/chills, chest pain, shortness of breath, abdominal pain, headaches, nausea/vomiting, and diarrhea/constipation.      PHYSICAL EXAM:  GENERAL: NAD  HEAD:  Normocephalic  EYES:  conjunctiva and sclera clear  ENMT: Moist mucous membranes  NECK: Supple  NERVOUS SYSTEM:  Alert, awake  CHEST/LUNG: Good air exchange bilaterally, no wheeze  HEART: Regular rate and rhythm        Vital Signs Last 24 Hrs  T(C): 36.6 (12 Mar 2020 06:45), Max: 36.6 (12 Mar 2020 06:45)  T(F): 97.9 (12 Mar 2020 06:45), Max: 97.9 (12 Mar 2020 06:45)  HR: 79 (12 Mar 2020 06:45) (76 - 79)  BP: 117/70 (12 Mar 2020 06:45) (105/61 - 117/70)  BP(mean): --  RR: 18 (12 Mar 2020 06:45) (18 - 19)  SpO2: --      Consultant(s) Notes Reviewed:  [X] YES  [ ] NO  Care Discussed with Consultants/Other Providers [X] YES  [ ] NO  Imaging Personally Reviewed:  [X] YES  [ ] NO      LABS:                        11.6   12.90 )-----------( 328      ( 11 Mar 2020 06:55 )             38.6     03-11    137  |  97<L>  |  14  ----------------------------<  244<H>  5.7<H>   |  29  |  0.6<L>    Ca    8.6      11 Mar 2020 06:55  Mg     1.7     03-11    TPro  6.1  /  Alb  3.0<L>  /  TBili  0.9  /  DBili  x   /  AST  31  /  ALT  40  /  AlkPhos  212<H>  03-11          CAPILLARY BLOOD GLUCOSE  145 (11 Mar 2020 21:43)      POCT Blood Glucose.: 150 mg/dL (12 Mar 2020 08:07)  POCT Blood Glucose.: 129 mg/dL (11 Mar 2020 16:40)  POCT Blood Glucose.: 131 mg/dL (11 Mar 2020 11:35) FLOWER MARISCAL 64y Male      Patient is a 64y old  Male who presents with a chief complaint of Altered mental status (11 Mar 2020 11:39)        INTERVAL HPI/OVERNIGHT EVENTS: No acute events overnight. Patient was seen and evaluated at the bedside. The patient denies pain. Vitals stable. Patient denies fever/chills, chest pain, shortness of breath, abdominal pain, headaches, nausea/vomiting, and diarrhea/constipation.      PHYSICAL EXAM:  GENERAL: NAD  HEAD:  Normocephalic  EYES:  conjunctiva and sclera clear  ENMT: Moist mucous membranes  NECK: Supple  NERVOUS SYSTEM:  Alert, awake  CHEST/LUNG: Good air exchange bilaterally, no wheeze  HEART: Regular rate and rhythm        Vital Signs Last 24 Hrs  T(C): 36.6 (12 Mar 2020 06:45), Max: 36.6 (12 Mar 2020 06:45)  T(F): 97.9 (12 Mar 2020 06:45), Max: 97.9 (12 Mar 2020 06:45)  HR: 79 (12 Mar 2020 06:45) (76 - 79)  BP: 117/70 (12 Mar 2020 06:45) (105/61 - 117/70)  BP(mean): --  RR: 18 (12 Mar 2020 06:45) (18 - 19)  SpO2: --      Consultant(s) Notes Reviewed:  [X] YES  [ ] NO  Care Discussed with Consultants/Other Providers [X] YES  [ ] NO  Imaging Personally Reviewed:  [X] YES  [ ] NO      LABS:                        11.6   12.90 )-----------( 328      ( 11 Mar 2020 06:55 )             38.6     03-11    137  |  97<L>  |  14  ----------------------------<  244<H>  5.7<H>   |  29  |  0.6<L>    Ca    8.6      11 Mar 2020 06:55  Mg     1.7     03-11    TPro  6.1  /  Alb  3.0<L>  /  TBili  0.9  /  DBili  x   /  AST  31  /  ALT  40  /  AlkPhos  212<H>  03-11

## 2020-03-12 NOTE — CHART NOTE - NSCHARTNOTEFT_GEN_A_CORE
RD Limited Follow Up Note:;    Unable to conduct face to face interview due to limited contact restrictions r/t pt's medical condition and isolation precautions. Pt cont on Consistent carb, DASH/TLC, low fat, 1L fluid restriction, ensure compact BID diet (gliadin restriction removed). Per EMR pt is consuming % of meal trays. LBM 3/9. Stage 2 PU to sacral spine. Wt hx: 85.4 kg (3/7) vs. 87kg (3/3) vs 82kg (3/1) vs 95.5kg (2/28) vs. 80.7kg (2/25) vs adm wt of 95.5kg--?bed-scale inaccuracy vs. wt loss. Labs and meds reviewed. Pt NPO after MN for possible EP procedure. No further nutrition interventions f/u in 7 days.

## 2020-03-12 NOTE — GOALS OF CARE CONVERSATION - ADVANCED CARE PLANNING - CONVERSATION DETAILS
D/W about HCP and also Advanced directive.   Patient HCP is his wife aminah   Not interested about AD.   Do not want to think about AD now

## 2020-03-12 NOTE — PROGRESS NOTE ADULT - ATTENDING COMMENTS
d/w patient and wife.   Agree with EP intervention. Patient not sure about the intervention. EP to explain to patient.     GOC - noted separately

## 2020-03-13 LAB
ALBUMIN SERPL ELPH-MCNC: 2.8 G/DL — LOW (ref 3.5–5.2)
ALP SERPL-CCNC: 183 U/L — HIGH (ref 30–115)
ALT FLD-CCNC: 35 U/L — SIGNIFICANT CHANGE UP (ref 0–41)
ANION GAP SERPL CALC-SCNC: 9 MMOL/L — SIGNIFICANT CHANGE UP (ref 7–14)
AST SERPL-CCNC: 28 U/L — SIGNIFICANT CHANGE UP (ref 0–41)
BASOPHILS # BLD AUTO: 0.06 K/UL — SIGNIFICANT CHANGE UP (ref 0–0.2)
BASOPHILS NFR BLD AUTO: 0.7 % — SIGNIFICANT CHANGE UP (ref 0–1)
BILIRUB SERPL-MCNC: 0.8 MG/DL — SIGNIFICANT CHANGE UP (ref 0.2–1.2)
BUN SERPL-MCNC: 18 MG/DL — SIGNIFICANT CHANGE UP (ref 10–20)
CALCIUM SERPL-MCNC: 8.6 MG/DL — SIGNIFICANT CHANGE UP (ref 8.5–10.1)
CHLORIDE SERPL-SCNC: 98 MMOL/L — SIGNIFICANT CHANGE UP (ref 98–110)
CO2 SERPL-SCNC: 29 MMOL/L — SIGNIFICANT CHANGE UP (ref 17–32)
CREAT SERPL-MCNC: 0.8 MG/DL — SIGNIFICANT CHANGE UP (ref 0.7–1.5)
EOSINOPHIL # BLD AUTO: 0.28 K/UL — SIGNIFICANT CHANGE UP (ref 0–0.7)
EOSINOPHIL NFR BLD AUTO: 3.2 % — SIGNIFICANT CHANGE UP (ref 0–8)
GLUCOSE BLDC GLUCOMTR-MCNC: 131 MG/DL — HIGH (ref 70–99)
GLUCOSE BLDC GLUCOMTR-MCNC: 159 MG/DL — HIGH (ref 70–99)
GLUCOSE BLDC GLUCOMTR-MCNC: 171 MG/DL — HIGH (ref 70–99)
GLUCOSE BLDC GLUCOMTR-MCNC: 99 MG/DL — SIGNIFICANT CHANGE UP (ref 70–99)
GLUCOSE SERPL-MCNC: 181 MG/DL — HIGH (ref 70–99)
HCT VFR BLD CALC: 36.7 % — LOW (ref 42–52)
HGB BLD-MCNC: 11 G/DL — LOW (ref 14–18)
IMM GRANULOCYTES NFR BLD AUTO: 0.2 % — SIGNIFICANT CHANGE UP (ref 0.1–0.3)
LYMPHOCYTES # BLD AUTO: 1.15 K/UL — LOW (ref 1.2–3.4)
LYMPHOCYTES # BLD AUTO: 13.1 % — LOW (ref 20.5–51.1)
MAGNESIUM SERPL-MCNC: 1.6 MG/DL — LOW (ref 1.8–2.4)
MCHC RBC-ENTMCNC: 22.4 PG — LOW (ref 27–31)
MCHC RBC-ENTMCNC: 30 G/DL — LOW (ref 32–37)
MCV RBC AUTO: 74.9 FL — LOW (ref 80–94)
MONOCYTES # BLD AUTO: 0.77 K/UL — HIGH (ref 0.1–0.6)
MONOCYTES NFR BLD AUTO: 8.8 % — SIGNIFICANT CHANGE UP (ref 1.7–9.3)
NEUTROPHILS # BLD AUTO: 6.48 K/UL — SIGNIFICANT CHANGE UP (ref 1.4–6.5)
NEUTROPHILS NFR BLD AUTO: 74 % — SIGNIFICANT CHANGE UP (ref 42.2–75.2)
NRBC # BLD: 0 /100 WBCS — SIGNIFICANT CHANGE UP (ref 0–0)
PLATELET # BLD AUTO: 325 K/UL — SIGNIFICANT CHANGE UP (ref 130–400)
POTASSIUM SERPL-MCNC: 5.3 MMOL/L — HIGH (ref 3.5–5)
POTASSIUM SERPL-SCNC: 5.3 MMOL/L — HIGH (ref 3.5–5)
PROT SERPL-MCNC: 5.7 G/DL — LOW (ref 6–8)
RBC # BLD: 4.9 M/UL — SIGNIFICANT CHANGE UP (ref 4.7–6.1)
RBC # FLD: 27.1 % — HIGH (ref 11.5–14.5)
SODIUM SERPL-SCNC: 136 MMOL/L — SIGNIFICANT CHANGE UP (ref 135–146)
WBC # BLD: 8.76 K/UL — SIGNIFICANT CHANGE UP (ref 4.8–10.8)
WBC # FLD AUTO: 8.76 K/UL — SIGNIFICANT CHANGE UP (ref 4.8–10.8)

## 2020-03-13 PROCEDURE — 99233 SBSQ HOSP IP/OBS HIGH 50: CPT

## 2020-03-13 PROCEDURE — 99232 SBSQ HOSP IP/OBS MODERATE 35: CPT

## 2020-03-13 RX ORDER — ISOPROPYL ALCOHOL, BENZOCAINE .7; .06 ML/ML; ML/ML
1 SWAB TOPICAL
Qty: 100 | Refills: 1
Start: 2020-03-13 | End: 2020-05-01

## 2020-03-13 RX ORDER — PRASUGREL 5 MG/1
10 TABLET, FILM COATED ORAL DAILY
Refills: 0 | Status: COMPLETED | OUTPATIENT
Start: 2020-03-13 | End: 2020-03-13

## 2020-03-13 RX ADMIN — DULOXETINE HYDROCHLORIDE 90 MILLIGRAM(S): 30 CAPSULE, DELAYED RELEASE ORAL at 11:28

## 2020-03-13 RX ADMIN — MIDODRINE HYDROCHLORIDE 5 MILLIGRAM(S): 2.5 TABLET ORAL at 13:27

## 2020-03-13 RX ADMIN — MIDODRINE HYDROCHLORIDE 5 MILLIGRAM(S): 2.5 TABLET ORAL at 21:56

## 2020-03-13 RX ADMIN — HEPARIN SODIUM 5000 UNIT(S): 5000 INJECTION INTRAVENOUS; SUBCUTANEOUS at 21:56

## 2020-03-13 RX ADMIN — HEPARIN SODIUM 5000 UNIT(S): 5000 INJECTION INTRAVENOUS; SUBCUTANEOUS at 13:26

## 2020-03-13 RX ADMIN — NYSTATIN CREAM 1 APPLICATION(S): 100000 CREAM TOPICAL at 13:27

## 2020-03-13 RX ADMIN — Medication 6 UNIT(S): at 12:09

## 2020-03-13 RX ADMIN — Medication 50 MILLIGRAM(S): at 06:41

## 2020-03-13 RX ADMIN — HEPARIN SODIUM 5000 UNIT(S): 5000 INJECTION INTRAVENOUS; SUBCUTANEOUS at 06:41

## 2020-03-13 RX ADMIN — MIDODRINE HYDROCHLORIDE 5 MILLIGRAM(S): 2.5 TABLET ORAL at 06:41

## 2020-03-13 RX ADMIN — Medication 6 UNIT(S): at 17:09

## 2020-03-13 RX ADMIN — TAMSULOSIN HYDROCHLORIDE 0.4 MILLIGRAM(S): 0.4 CAPSULE ORAL at 21:57

## 2020-03-13 RX ADMIN — NYSTATIN CREAM 1 APPLICATION(S): 100000 CREAM TOPICAL at 06:41

## 2020-03-13 RX ADMIN — Medication 1 APPLICATION(S): at 06:42

## 2020-03-13 RX ADMIN — ATORVASTATIN CALCIUM 40 MILLIGRAM(S): 80 TABLET, FILM COATED ORAL at 21:54

## 2020-03-13 RX ADMIN — Medication 2: at 17:09

## 2020-03-13 RX ADMIN — NYSTATIN CREAM 1 APPLICATION(S): 100000 CREAM TOPICAL at 21:57

## 2020-03-13 RX ADMIN — Medication 0.12 MILLIGRAM(S): at 06:41

## 2020-03-13 RX ADMIN — Medication 1 APPLICATION(S): at 17:10

## 2020-03-13 RX ADMIN — PRASUGREL 10 MILLIGRAM(S): 5 TABLET, FILM COATED ORAL at 11:29

## 2020-03-13 RX ADMIN — Medication 81 MILLIGRAM(S): at 11:28

## 2020-03-13 NOTE — PROGRESS NOTE ADULT - ASSESSMENT
Cardiologist: Dr. Trinidad    65yo M c PMHx HTN, DLD, CAD s/p CABG, HFrEF (25-30% on ECHO 2/2020, 20-25% on MUGA scan in 12/2019) s/p [facility] BiV placement 2/11/2020, DM (on insulin pump), pulmonary hypertension, Celiac disease, diverticulitis, s/p right total knee replacement October 2019 complicated by infection s/p abx course w/ PICC and  right thigh with skin graft, right hip fracture s/p ORIF 2/2020  admitted for metabolic encephalopathy/ found to have UTI, course complicated by severe sepsis secondary to cholecystitis 2/2 cholelithiasis. ICD interrogated and not functioning properly.    Impression:   Sepsis 2/2 cholelithiasis/cholecystitis  Hx HFrEF s/p [facility] BiV ICD on 2/11/2020  Hx CABG  HX HTN  ICD interrogation - poor thresholds, BiV pacing not optimized    Plan:  - Requires lead revision, OR tentatively Monday with CT surgery  - Cont current meds  - PT/OT   - Will follow

## 2020-03-13 NOTE — PROGRESS NOTE ADULT - SUBJECTIVE AND OBJECTIVE BOX
Hospital Day:  21d    Subjective:    Patient is a 64y old  Male who presents with a chief complaint of Altered mental status (12 Mar 2020 17:28)      Past Medical Hx:   Diabetic neuropathy  STEMI (ST elevation myocardial infarction)  Diverticulitis  MRSA bacteremia  History of celiac disease  CHF (congestive heart failure)  HTN (hypertension)  Diabetes  Blood clot due to device, implant, or graft  CKD (chronic kidney disease) stage 2, GFR 60-89 ml/min  COPD, moderate  Hyperkalemia  HLD (hyperlipidemia)  Chronic systolic CHF (congestive heart failure)  Osteoarthritis  HTN (hypertension)  Type 2 diabetes mellitus with neurological manifestations  Type 2 diabetes mellitus  History of surgery to heart and great vessels, presenting hazards to health  Atherosclerosis of coronary artery  Status post percutaneous transluminal coronary angioplasty    Past Sx:  History of open reduction and internal fixation (ORIF) procedure  Surgery, elective  Surgery, elective  S/P TKR (total knee replacement), right  S/P CABG x 1  Stented coronary artery  History of surgery to major organs, presenting hazards to health  Other postprocedural status    Allergies:  ACE inhibitors (Hives)  enalapril (Hives)    Current Meds:   Stand Meds:  aspirin enteric coated 81 milliGRAM(s) Oral daily  atorvastatin Oral Tab/Cap - Peds 40 milliGRAM(s) Oral daily  BACItracin   Ointment 1 Application(s) Topical two times a day  chlorhexidine 4% Liquid 1 Application(s) Topical <User Schedule>  dextrose 5%. 1000 milliLiter(s) (50 mL/Hr) IV Continuous <Continuous>  dextrose 50% Injectable 12.5 Gram(s) IV Push once  dextrose 50% Injectable 25 Gram(s) IV Push once  dextrose 50% Injectable 25 Gram(s) IV Push once  digoxin     Tablet 0.125 milliGRAM(s) Oral daily  DULoxetine 90 milliGRAM(s) Oral daily  heparin  Injectable 5000 Unit(s) SubCutaneous every 8 hours  insulin glargine Injectable (LANTUS) 20 Unit(s) SubCutaneous at bedtime  insulin lispro (HumaLOG) corrective regimen sliding scale   SubCutaneous three times a day before meals  insulin lispro Injectable (HumaLOG) 6 Unit(s) SubCutaneous three times a day before meals  metoprolol succinate ER 50 milliGRAM(s) Oral daily  midodrine. 5 milliGRAM(s) Oral every 8 hours  nystatin Powder 1 Application(s) Topical every 8 hours  pantoprazole    Tablet 40 milliGRAM(s) Oral at bedtime  tamsulosin Oral Tab/Cap - Peds 0.4 milliGRAM(s) Oral at bedtime    PRN Meds:  acetaminophen   Tablet .. 650 milliGRAM(s) Oral every 6 hours PRN Temp greater or equal to 38C (100.4F), Moderate Pain (4 - 6)  aluminum hydroxide/magnesium hydroxide/simethicone Suspension 30 milliLiter(s) Oral every 6 hours PRN Dyspepsia  dextrose 40% Gel 15 Gram(s) Oral once PRN Blood Glucose LESS THAN 70 milliGRAM(s)/deciliter  glucagon  Injectable 1 milliGRAM(s) IntraMuscular once PRN Glucose LESS THAN 70 milligrams/deciliter  ondansetron Injectable 4 milliGRAM(s) IV Push once PRN Nausea and/or Vomiting    HOME MEDICATIONS:  aspirin 81 mg oral delayed release tablet: 1 tab(s) orally once a day  atorvastatin 40 mg oral tablet: 1 tab(s) orally once a day  digoxin 125 mcg (0.125 mg) oral tablet: 1 tab(s) orally once a day  DULoxetine 30 mg oral delayed release capsule: 3 cap(s) orally once a day  Jardiance:   Metoprolol Succinate ER 50 mg oral tablet, extended release: 3 tab(s) orally once a day  pantoprazole 40 mg oral delayed release tablet: 1 tab(s) orally once a day  prasugrel 10 mg oral tablet: 1 tab(s) orally once a day  tamsulosin 0.4 mg oral capsule: 1 cap(s) orally once a day (at bedtime)  Trulicity Pen: subcutaneous once a week      Vital Signs:   T(F): 97 (03-13-20 @ 05:45), Max: 97 (03-13-20 @ 05:45)  HR: 81 (03-13-20 @ 05:45) (60 - 81)  BP: 123/66 (03-13-20 @ 05:45) (102/59 - 123/66)  RR: 18 (03-13-20 @ 05:45) (18 - 18)  SpO2: --      03-12-20 @ 07:01  -  03-13-20 @ 07:00  --------------------------------------------------------  IN: 0 mL / OUT: 70 mL / NET: -70 mL        Physical Exam:   GENERAL: NAD  HEENT: NCAT  CHEST/LUNG: CTAB  HEART: Regular rate and rhythm; s1 s2 appreciated, No murmurs, rubs, or gallops  ABDOMEN: Soft, Nontender, Nondistended; Bowel sounds present  EXTREMITIES: No LE edema b/l  SKIN: no rashes, no new lesions  NERVOUS SYSTEM:  Alert & Oriented X3  LINES/CATHETERS:        Labs:                         11.0   8.76  )-----------( 325      ( 13 Mar 2020 05:58 )             36.7     Neutophil% 74.0, Lymphocyte% 13.1, Monocyte% 8.8, Bands% 0.2 03-13-20 @ 05:58    13 Mar 2020 05:58    136    |  98     |  18     ----------------------------<  181    5.3     |  29     |  0.8      Ca    8.6        13 Mar 2020 05:58  Mg     1.6       13 Mar 2020 05:58    TPro  5.7    /  Alb  2.8    /  TBili  0.8    /  DBili  x      /  AST  28     /  ALT  35     /  AlkPhos  183    13 Mar 2020 05:58    Hemoglobin A1C, Whole Blood: 9.1 % (01-17-20 @ 06:27)    Radiology:   None Today    Assessment and Plan:   This is a 64 year old male with PMHx of HTN, DLD, CAD s/p CABG, HFrEF (ef 25-30% on 2/2020) s/p [facility] BiV placement in 2/2020, Diabetes on insulin pummp, Pulmonary HTN, Celiacs, Diverticulitis, Right Hip Fracture s/p ORIF who presented with metabolic encephalopathy. Hospital Day:  21d    Subjective:    Patient is a 64y old  Male who presents with a chief complaint of Altered mental status (12 Mar 2020 17:28)    No overnight events. Seen and examined at bedside. Reported no acute complaints. Aware of plan for possibly going with CT surgery for lead correction. Will continue to monitor and follow up with them. Reported minimal output from Perc choley.     Past Medical Hx:   Diabetic neuropathy  STEMI (ST elevation myocardial infarction)  Diverticulitis  MRSA bacteremia  History of celiac disease  CHF (congestive heart failure)  HTN (hypertension)  Diabetes  Blood clot due to device, implant, or graft  CKD (chronic kidney disease) stage 2, GFR 60-89 ml/min  COPD, moderate  Hyperkalemia  HLD (hyperlipidemia)  Chronic systolic CHF (congestive heart failure)  Osteoarthritis  HTN (hypertension)  Type 2 diabetes mellitus with neurological manifestations  Type 2 diabetes mellitus  History of surgery to heart and great vessels, presenting hazards to health  Atherosclerosis of coronary artery  Status post percutaneous transluminal coronary angioplasty    Past Sx:  History of open reduction and internal fixation (ORIF) procedure  Surgery, elective  Surgery, elective  S/P TKR (total knee replacement), right  S/P CABG x 1  Stented coronary artery  History of surgery to major organs, presenting hazards to health  Other postprocedural status    Allergies:  ACE inhibitors (Hives)  enalapril (Hives)    Current Meds:   Standng Meds:  aspirin enteric coated 81 milliGRAM(s) Oral daily  atorvastatin Oral Tab/Cap - Peds 40 milliGRAM(s) Oral daily  BACItracin   Ointment 1 Application(s) Topical two times a day  chlorhexidine 4% Liquid 1 Application(s) Topical <User Schedule>  dextrose 5%. 1000 milliLiter(s) (50 mL/Hr) IV Continuous <Continuous>  dextrose 50% Injectable 12.5 Gram(s) IV Push once  dextrose 50% Injectable 25 Gram(s) IV Push once  dextrose 50% Injectable 25 Gram(s) IV Push once  digoxin     Tablet 0.125 milliGRAM(s) Oral daily  DULoxetine 90 milliGRAM(s) Oral daily  heparin  Injectable 5000 Unit(s) SubCutaneous every 8 hours  insulin glargine Injectable (LANTUS) 20 Unit(s) SubCutaneous at bedtime  insulin lispro (HumaLOG) corrective regimen sliding scale   SubCutaneous three times a day before meals  insulin lispro Injectable (HumaLOG) 6 Unit(s) SubCutaneous three times a day before meals  metoprolol succinate ER 50 milliGRAM(s) Oral daily  midodrine. 5 milliGRAM(s) Oral every 8 hours  nystatin Powder 1 Application(s) Topical every 8 hours  pantoprazole    Tablet 40 milliGRAM(s) Oral at bedtime  tamsulosin Oral Tab/Cap - Peds 0.4 milliGRAM(s) Oral at bedtime    PRN Meds:  acetaminophen   Tablet .. 650 milliGRAM(s) Oral every 6 hours PRN Temp greater or equal to 38C (100.4F), Moderate Pain (4 - 6)  aluminum hydroxide/magnesium hydroxide/simethicone Suspension 30 milliLiter(s) Oral every 6 hours PRN Dyspepsia  dextrose 40% Gel 15 Gram(s) Oral once PRN Blood Glucose LESS THAN 70 milliGRAM(s)/deciliter  glucagon  Injectable 1 milliGRAM(s) IntraMuscular once PRN Glucose LESS THAN 70 milligrams/deciliter  ondansetron Injectable 4 milliGRAM(s) IV Push once PRN Nausea and/or Vomiting    HOME MEDICATIONS:  aspirin 81 mg oral delayed release tablet: 1 tab(s) orally once a day  atorvastatin 40 mg oral tablet: 1 tab(s) orally once a day  digoxin 125 mcg (0.125 mg) oral tablet: 1 tab(s) orally once a day  DULoxetine 30 mg oral delayed release capsule: 3 cap(s) orally once a day  Jardiance:   Metoprolol Succinate ER 50 mg oral tablet, extended release: 3 tab(s) orally once a day  pantoprazole 40 mg oral delayed release tablet: 1 tab(s) orally once a day  prasugrel 10 mg oral tablet: 1 tab(s) orally once a day  tamsulosin 0.4 mg oral capsule: 1 cap(s) orally once a day (at bedtime)  Trulicity Pen: subcutaneous once a week      Vital Signs:   T(F): 97 (03-13-20 @ 05:45), Max: 97 (03-13-20 @ 05:45)  HR: 81 (03-13-20 @ 05:45) (60 - 81)  BP: 123/66 (03-13-20 @ 05:45) (102/59 - 123/66)  RR: 18 (03-13-20 @ 05:45) (18 - 18)  SpO2: --      03-12-20 @ 07:01  -  03-13-20 @ 07:00  --------------------------------------------------------  IN: 0 mL / OUT: 70 mL / NET: -70 mL    Physical Exam:   GENERAL: NAD, Pleasant and conversive, appearing stated age   HEENT: NCAT, PERRLA, EOMI  CHEST/LUNG: Clear to auscultation bilaterally, no wheezing, rhonchi, rales  HEART: Regular rate and rhythm; s1 s2 appreciated, No murmurs, rubs, or gallops  ABDOMEN: Soft, Nontender, Nondistended; Bowel sounds present  EXTREMITIES: No LE edema b/l, Peripheral pusles 2+, No cyanosis, No clubbing  NERVOUS SYSTEM:  Alert & Oriented X3, Non focal  LINES/CATHETERS: Per Choley drain    Labs:                         11.0   8.76  )-----------( 325      ( 13 Mar 2020 05:58 )             36.7     Neutophil% 74.0, Lymphocyte% 13.1, Monocyte% 8.8, Bands% 0.2 03-13-20 @ 05:58    13 Mar 2020 05:58    136    |  98     |  18     ----------------------------<  181    5.3     |  29     |  0.8      Ca    8.6        13 Mar 2020 05:58  Mg     1.6       13 Mar 2020 05:58    TPro  5.7    /  Alb  2.8    /  TBili  0.8    /  DBili  x      /  AST  28     /  ALT  35     /  AlkPhos  183    13 Mar 2020 05:58    Hemoglobin A1C, Whole Blood: 9.1 % (01-17-20 @ 06:27)    Radiology:   None Today    Assessment and Plan:   This is a 64 year old male with PMHx of HTN, DLD, CAD s/p CABG, HFrEF (ef 25-30% on 2/2020) s/p [facility] BiV placement in 2/2020, Diabetes on insulin pummp, Pulmonary HTN, Celiacs, Diverticulitis, Right Hip Fracture s/p ORIF who presented with metabolic encephalopathy.     #CHFrEF (EF 25-30%),   - Noted recent BiV AICD malfunctioning.   - EPS aware; plan for CT surgery to do lead replacement pending Dr. Fernandes's examination of incision site  - Continue on Digoxin 0.125 and metoprolol 50mg daily    #sepsis (resolved) secondary to acute cholecystitis   - Noted hydropic gallbladder with stones and sludge.   - S/p perc choley with minimal drain output  - IR contacted for follow up     #CAD s/p CABG and PCI  - Continue with ASA, Atorvastatin, and Metoprolol  - Prasugrel on hold pending CT surgery plan for lead malfunction    #Celiacs  - Continue on gluten restricted diet    #Type 2 DM  - Continue to monitor finger sticks  POCT Blood Glucose.: 171 mg/dL (13 Mar 2020 08:20)  POCT Blood Glucose.: 204 mg/dL (12 Mar 2020 21:34)  POCT Blood Glucose.: 142 mg/dL (12 Mar 2020 16:31)  POCT Blood Glucose.: 161 mg/dL (12 Mar 2020 11:15)  - Continue on Insulin regimen: Lantus 20, Humalog 6/6/6    #HTN (stable)    #Anxiety  - Continue on Cymbalta     #Skin graft  - Wound care as per burn team    Activity; As tolerated  Diet: Carb Consistent DASH diet; NPO right now for possible procedure  DVT ppx: heparin Sub Q  GI ppx: Protonix  Code Status: Full Code  DISPO: From [facility], Pending lead correction and placement back at SNF. Hospital Day:  21d    Subjective:    Patient is a 64y old  Male who presents with a chief complaint of Altered mental status (12 Mar 2020 17:28)    No overnight events. Seen and examined at bedside. Reported no acute complaints. Aware of plan for possibly going with CT surgery for lead correction. Will continue to monitor and follow up with them. Reported minimal output from Perc choley.     Past Medical Hx:   Diabetic neuropathy  STEMI (ST elevation myocardial infarction)  Diverticulitis  MRSA bacteremia  History of celiac disease  CHF (congestive heart failure)  HTN (hypertension)  Diabetes  Blood clot due to device, implant, or graft  CKD (chronic kidney disease) stage 2, GFR 60-89 ml/min  COPD, moderate  Hyperkalemia  HLD (hyperlipidemia)  Chronic systolic CHF (congestive heart failure)  Osteoarthritis  HTN (hypertension)  Type 2 diabetes mellitus with neurological manifestations  Type 2 diabetes mellitus  History of surgery to heart and great vessels, presenting hazards to health  Atherosclerosis of coronary artery  Status post percutaneous transluminal coronary angioplasty    Past Sx:  History of open reduction and internal fixation (ORIF) procedure  Surgery, elective  Surgery, elective  S/P TKR (total knee replacement), right  S/P CABG x 1  Stented coronary artery  History of surgery to major organs, presenting hazards to health  Other postprocedural status    Allergies:  ACE inhibitors (Hives)  enalapril (Hives)    Current Meds:   Standng Meds:  aspirin enteric coated 81 milliGRAM(s) Oral daily  atorvastatin Oral Tab/Cap - Peds 40 milliGRAM(s) Oral daily  BACItracin   Ointment 1 Application(s) Topical two times a day  chlorhexidine 4% Liquid 1 Application(s) Topical <User Schedule>  dextrose 5%. 1000 milliLiter(s) (50 mL/Hr) IV Continuous <Continuous>  dextrose 50% Injectable 12.5 Gram(s) IV Push once  dextrose 50% Injectable 25 Gram(s) IV Push once  dextrose 50% Injectable 25 Gram(s) IV Push once  digoxin     Tablet 0.125 milliGRAM(s) Oral daily  DULoxetine 90 milliGRAM(s) Oral daily  heparin  Injectable 5000 Unit(s) SubCutaneous every 8 hours  insulin glargine Injectable (LANTUS) 20 Unit(s) SubCutaneous at bedtime  insulin lispro (HumaLOG) corrective regimen sliding scale   SubCutaneous three times a day before meals  insulin lispro Injectable (HumaLOG) 6 Unit(s) SubCutaneous three times a day before meals  metoprolol succinate ER 50 milliGRAM(s) Oral daily  midodrine. 5 milliGRAM(s) Oral every 8 hours  nystatin Powder 1 Application(s) Topical every 8 hours  pantoprazole    Tablet 40 milliGRAM(s) Oral at bedtime  tamsulosin Oral Tab/Cap - Peds 0.4 milliGRAM(s) Oral at bedtime    PRN Meds:  acetaminophen   Tablet .. 650 milliGRAM(s) Oral every 6 hours PRN Temp greater or equal to 38C (100.4F), Moderate Pain (4 - 6)  aluminum hydroxide/magnesium hydroxide/simethicone Suspension 30 milliLiter(s) Oral every 6 hours PRN Dyspepsia  dextrose 40% Gel 15 Gram(s) Oral once PRN Blood Glucose LESS THAN 70 milliGRAM(s)/deciliter  glucagon  Injectable 1 milliGRAM(s) IntraMuscular once PRN Glucose LESS THAN 70 milligrams/deciliter  ondansetron Injectable 4 milliGRAM(s) IV Push once PRN Nausea and/or Vomiting    HOME MEDICATIONS:  aspirin 81 mg oral delayed release tablet: 1 tab(s) orally once a day  atorvastatin 40 mg oral tablet: 1 tab(s) orally once a day  digoxin 125 mcg (0.125 mg) oral tablet: 1 tab(s) orally once a day  DULoxetine 30 mg oral delayed release capsule: 3 cap(s) orally once a day  Jardiance:   Metoprolol Succinate ER 50 mg oral tablet, extended release: 3 tab(s) orally once a day  pantoprazole 40 mg oral delayed release tablet: 1 tab(s) orally once a day  prasugrel 10 mg oral tablet: 1 tab(s) orally once a day  tamsulosin 0.4 mg oral capsule: 1 cap(s) orally once a day (at bedtime)  Trulicity Pen: subcutaneous once a week      Vital Signs:   T(F): 97 (03-13-20 @ 05:45), Max: 97 (03-13-20 @ 05:45)  HR: 81 (03-13-20 @ 05:45) (60 - 81)  BP: 123/66 (03-13-20 @ 05:45) (102/59 - 123/66)  RR: 18 (03-13-20 @ 05:45) (18 - 18)  SpO2: --      03-12-20 @ 07:01  -  03-13-20 @ 07:00  --------------------------------------------------------  IN: 0 mL / OUT: 70 mL / NET: -70 mL    Physical Exam:   GENERAL: NAD, Pleasant and conversive, appearing stated age   HEENT: NCAT, PERRLA, EOMI  CHEST/LUNG: Clear to auscultation bilaterally, no wheezing, rhonchi, rales  HEART: Regular rate and rhythm; s1 s2 appreciated, No murmurs, rubs, or gallops  ABDOMEN: Soft, Nontender, Nondistended; Bowel sounds present  EXTREMITIES: No LE edema b/l, Peripheral pusles 2+, No cyanosis, No clubbing  NERVOUS SYSTEM:  Alert & Oriented X3, Non focal  LINES/CATHETERS: Per Choley drain    Labs:                         11.0   8.76  )-----------( 325      ( 13 Mar 2020 05:58 )             36.7     Neutophil% 74.0, Lymphocyte% 13.1, Monocyte% 8.8, Bands% 0.2 03-13-20 @ 05:58    13 Mar 2020 05:58    136    |  98     |  18     ----------------------------<  181    5.3     |  29     |  0.8      Ca    8.6        13 Mar 2020 05:58  Mg     1.6       13 Mar 2020 05:58    TPro  5.7    /  Alb  2.8    /  TBili  0.8    /  DBili  x      /  AST  28     /  ALT  35     /  AlkPhos  183    13 Mar 2020 05:58    Hemoglobin A1C, Whole Blood: 9.1 % (01-17-20 @ 06:27)    Radiology:   None Today    Assessment and Plan:   This is a 64 year old male with PMHx of HTN, DLD, CAD s/p CABG, HFrEF (ef 25-30% on 2/2020) s/p [facility] BiV placement in 2/2020, Diabetes on insulin pummp, Pulmonary HTN, Celiacs, Diverticulitis, Right Hip Fracture s/p ORIF who presented with metabolic encephalopathy.     #CHFrEF (EF 25-30%),   - Noted recent BiV AICD malfunctioning.   - EPS aware; plan for CT surgery to do lead replacement pending Dr. Fernandes's examination of incision site  - Continue on Digoxin 0.125 and metoprolol 50mg daily    #sepsis (resolved) secondary to acute cholecystitis   - Noted hydropic gallbladder with stones and sludge.   - S/p perc choley with minimal drain output  - IR contacted for follow up     #CAD s/p CABG and PCI  - Continue with ASA, Atorvastatin, and Metoprolol  - Prasugrel today and hold starting Saturday for CT surgery on Monday     #Celiacs  - Continue on gluten restricted diet    #Type 2 DM  - Continue to monitor finger sticks  POCT Blood Glucose.: 171 mg/dL (13 Mar 2020 08:20)  POCT Blood Glucose.: 204 mg/dL (12 Mar 2020 21:34)  POCT Blood Glucose.: 142 mg/dL (12 Mar 2020 16:31)  POCT Blood Glucose.: 161 mg/dL (12 Mar 2020 11:15)  - Continue on Insulin regimen: Lantus 20, Humalog 6/6/6    #HTN (stable)    #Anxiety  - Continue on Cymbalta     #Skin graft  - Wound care as per burn team    Activity; As tolerated  Diet: Carb Consistent DASH diet  DVT ppx: heparin Sub Q  GI ppx: Protonix  Code Status: Full Code  DISPO: From [facility], Pending lead correction and placement back at SNF.

## 2020-03-13 NOTE — PROGRESS NOTE ADULT - ATTENDING COMMENTS
I saw and examined the patient independently. I agree with above history, physical exam and plan of care which I have reviewed and edited where appropriate with following additions.    patient is asymptomatic today / very pleasant/cooperative .     HFrEF with malfunctioning AICD lead:   c/w tele.   euvolemic.   fu CT surgery for AICD LV lead revision procedure.   c/w current meds.     sepsis due to acute cholecystitis:   sepsis resolved.   completed ABX.   sp percutaneous drainage/drain with minimal Output noted today morning.   fu IR for removal of drain.    dvt ppx.     Progress note Handoff:   Pending: ICD lead revision  Discussion: plan of care with  patient /  satisfied- all questions answered.  Disposition: home likely

## 2020-03-13 NOTE — PROGRESS NOTE ADULT - SUBJECTIVE AND OBJECTIVE BOX
INTERVAL HPI/OVERNIGHT EVENTS:  Patient in SR with occasional PVCs. Patient denies fever, chills, dizziness, syncope, chest pain, palpitations, SOB, cough, abd pain.    MEDICATIONS  (STANDING):  aspirin enteric coated 81 milliGRAM(s) Oral daily  atorvastatin Oral Tab/Cap - Peds 40 milliGRAM(s) Oral daily  BACItracin   Ointment 1 Application(s) Topical two times a day  chlorhexidine 4% Liquid 1 Application(s) Topical <User Schedule>  dextrose 5%. 1000 milliLiter(s) (50 mL/Hr) IV Continuous <Continuous>  dextrose 50% Injectable 12.5 Gram(s) IV Push once  dextrose 50% Injectable 25 Gram(s) IV Push once  dextrose 50% Injectable 25 Gram(s) IV Push once  digoxin     Tablet 0.125 milliGRAM(s) Oral daily  DULoxetine 90 milliGRAM(s) Oral daily  heparin  Injectable 5000 Unit(s) SubCutaneous every 8 hours  insulin glargine Injectable (LANTUS) 20 Unit(s) SubCutaneous at bedtime  insulin lispro (HumaLOG) corrective regimen sliding scale   SubCutaneous three times a day before meals  insulin lispro Injectable (HumaLOG) 6 Unit(s) SubCutaneous three times a day before meals  metoprolol succinate ER 50 milliGRAM(s) Oral daily  midodrine. 5 milliGRAM(s) Oral every 8 hours  nystatin Powder 1 Application(s) Topical every 8 hours  pantoprazole    Tablet 40 milliGRAM(s) Oral at bedtime  tamsulosin Oral Tab/Cap - Peds 0.4 milliGRAM(s) Oral at bedtime    MEDICATIONS  (PRN):  acetaminophen   Tablet .. 650 milliGRAM(s) Oral every 6 hours PRN Temp greater or equal to 38C (100.4F), Moderate Pain (4 - 6)  aluminum hydroxide/magnesium hydroxide/simethicone Suspension 30 milliLiter(s) Oral every 6 hours PRN Dyspepsia  dextrose 40% Gel 15 Gram(s) Oral once PRN Blood Glucose LESS THAN 70 milliGRAM(s)/deciliter  glucagon  Injectable 1 milliGRAM(s) IntraMuscular once PRN Glucose LESS THAN 70 milligrams/deciliter  ondansetron Injectable 4 milliGRAM(s) IV Push once PRN Nausea and/or Vomiting      Allergies  ACE inhibitors (Hives)  enalapril (Hives)    REVIEW OF SYSTEMS  A ten-point review of systems was otherwise negative except as noted.    Vital Signs Last 24 Hrs  T(C): 36.1 (13 Mar 2020 05:45), Max: 36.1 (13 Mar 2020 05:45)  T(F): 97 (13 Mar 2020 05:45), Max: 97 (13 Mar 2020 05:45)  HR: 81 (13 Mar 2020 05:45) (60 - 81)  BP: 123/66 (13 Mar 2020 05:45) (102/59 - 123/66)  BP(mean): 77 (12 Mar 2020 13:42) (77 - 77)  RR: 18 (13 Mar 2020 05:45) (18 - 18)  SpO2: --    03-12-20 @ 07:01  -  03-13-20 @ 07:00  --------------------------------------------------------  IN: 0 mL / OUT: 70 mL / NET: -70 mL    03-13-20 @ 07:01  -  03-13-20 @ 10:43  --------------------------------------------------------  IN: 590 mL / OUT: 775 mL / NET: -185 mL    Physical Exam  GENERAL: In no apparent distress, well nourished, and hydrated.  HEART: Regular rate and rhythm; No murmurs, rubs, or gallops.  PULMONARY: Clear to auscultation and perfusion.  No rales, wheezing, or rhonchi bilaterally.  CHEST: Left upper chest wall surgical wound with erythema and scab, no drainage noted.  ABDOMEN: Soft, Nontender, Nondistended; Bowel sounds present  EXTREMITIES:  2+ Peripheral Pulses, No clubbing, cyanosis, or edema  NEUROLOGICAL: Grossly nonfocal    LABS:                        11.0   8.76  )-----------( 325      ( 13 Mar 2020 05:58 )             36.7     03-13    136  |  98  |  18  ----------------------------<  181<H>  5.3<H>   |  29  |  0.8    Ca    8.6      13 Mar 2020 05:58  Mg     1.6     03-13    TPro  5.7<L>  /  Alb  2.8<L>  /  TBili  0.8  /  DBili  x   /  AST  28  /  ALT  35  /  AlkPhos  183<H>  03-13    RADIOLOGY & ADDITIONAL TESTS:  < from: Transthoracic Echocardiogram (02.13.20 @ 14:37) >  Summary:   1. Left ventricular ejection fraction, by visual estimation, is 25 to 30%.   2. Severely decreased segmental left ventricular systolic function.   3. Moderate to severely increased left ventricular internal cavity size.   4. Mild to moderate mitral valve regurgitation.   5. Mild-moderate tricuspid regurgitation.   6. Mild aortic regurgitation.   7. Estimated pulmonary artery systolic pressure is 51.8 mmHg assuming a right atrial pressure of 15 mmHg, whichis consistent with moderate pulmonary hypertension.    PHYSICIAN INTERPRETATION:  Left Ventricle: The left ventricular internal cavity size is moderate to severely increased. Left ventricular wall thickness is normal. Left ventricular ejection fraction, by visual estimation, is 25 to 30%. Severely decreased segmental left ventricular systolic function.  Right Ventricle: Normal right ventricular size and function.  Left Atrium: Left atrial enlargement.  Right Atrium: Right atrial enlargement.  Pericardium: There is no evidence of pericardial effusion.  Mitral Valve: No evidence of mitral stenosis. Mild to moderate mitral valve regurgitation is seen.  Tricuspid Valve: Mild-moderate tricuspid regurgitation is visualized. Estimated pulmonary artery systolic pressure is 51.8 mmHg assuming a right atrial pressure of 15 mmHg, which is consistent with moderate pulmonary hypertension.  Aortic Valve: No evidence of aortic stenosis. Aortic valve thickening with normal leaflet opening. Mild aortic valve regurgitation is seen.  Pulmonic Valve: Trace pulmonic valve regurgitation.  Aorta: The aortic root is normal in size and structure.  Venous: The inferior vena cava was dilated, with respiratory size variation less than 50%, consistent with elevated right atrial pressure.  Additional Comments: A pacer wire is visualized in the right atrium and right ventricle.    < end of copied text >

## 2020-03-14 LAB
ALBUMIN SERPL ELPH-MCNC: 2.8 G/DL — LOW (ref 3.5–5.2)
ALP SERPL-CCNC: 185 U/L — HIGH (ref 30–115)
ALT FLD-CCNC: 34 U/L — SIGNIFICANT CHANGE UP (ref 0–41)
ANION GAP SERPL CALC-SCNC: 9 MMOL/L — SIGNIFICANT CHANGE UP (ref 7–14)
AST SERPL-CCNC: 31 U/L — SIGNIFICANT CHANGE UP (ref 0–41)
BASOPHILS # BLD AUTO: 0.11 K/UL — SIGNIFICANT CHANGE UP (ref 0–0.2)
BASOPHILS NFR BLD AUTO: 1.5 % — HIGH (ref 0–1)
BILIRUB SERPL-MCNC: 1 MG/DL — SIGNIFICANT CHANGE UP (ref 0.2–1.2)
BUN SERPL-MCNC: 19 MG/DL — SIGNIFICANT CHANGE UP (ref 10–20)
CALCIUM SERPL-MCNC: 8.5 MG/DL — SIGNIFICANT CHANGE UP (ref 8.5–10.1)
CHLORIDE SERPL-SCNC: 101 MMOL/L — SIGNIFICANT CHANGE UP (ref 98–110)
CO2 SERPL-SCNC: 28 MMOL/L — SIGNIFICANT CHANGE UP (ref 17–32)
CREAT SERPL-MCNC: 0.7 MG/DL — SIGNIFICANT CHANGE UP (ref 0.7–1.5)
EOSINOPHIL # BLD AUTO: 0.25 K/UL — SIGNIFICANT CHANGE UP (ref 0–0.7)
EOSINOPHIL NFR BLD AUTO: 3.3 % — SIGNIFICANT CHANGE UP (ref 0–8)
GLUCOSE BLDC GLUCOMTR-MCNC: 164 MG/DL — HIGH (ref 70–99)
GLUCOSE BLDC GLUCOMTR-MCNC: 192 MG/DL — HIGH (ref 70–99)
GLUCOSE BLDC GLUCOMTR-MCNC: 216 MG/DL — HIGH (ref 70–99)
GLUCOSE BLDC GLUCOMTR-MCNC: 98 MG/DL — SIGNIFICANT CHANGE UP (ref 70–99)
GLUCOSE SERPL-MCNC: 113 MG/DL — HIGH (ref 70–99)
HCT VFR BLD CALC: 34.9 % — LOW (ref 42–52)
HGB BLD-MCNC: 10.8 G/DL — LOW (ref 14–18)
IMM GRANULOCYTES NFR BLD AUTO: 0.3 % — SIGNIFICANT CHANGE UP (ref 0.1–0.3)
LYMPHOCYTES # BLD AUTO: 1.17 K/UL — LOW (ref 1.2–3.4)
LYMPHOCYTES # BLD AUTO: 15.5 % — LOW (ref 20.5–51.1)
MAGNESIUM SERPL-MCNC: 1.6 MG/DL — LOW (ref 1.8–2.4)
MCHC RBC-ENTMCNC: 23.5 PG — LOW (ref 27–31)
MCHC RBC-ENTMCNC: 30.9 G/DL — LOW (ref 32–37)
MCV RBC AUTO: 75.9 FL — LOW (ref 80–94)
MONOCYTES # BLD AUTO: 0.64 K/UL — HIGH (ref 0.1–0.6)
MONOCYTES NFR BLD AUTO: 8.5 % — SIGNIFICANT CHANGE UP (ref 1.7–9.3)
NEUTROPHILS # BLD AUTO: 5.34 K/UL — SIGNIFICANT CHANGE UP (ref 1.4–6.5)
NEUTROPHILS NFR BLD AUTO: 70.9 % — SIGNIFICANT CHANGE UP (ref 42.2–75.2)
NRBC # BLD: 0 /100 WBCS — SIGNIFICANT CHANGE UP (ref 0–0)
PLATELET # BLD AUTO: 277 K/UL — SIGNIFICANT CHANGE UP (ref 130–400)
POTASSIUM SERPL-MCNC: 4.5 MMOL/L — SIGNIFICANT CHANGE UP (ref 3.5–5)
POTASSIUM SERPL-SCNC: 4.5 MMOL/L — SIGNIFICANT CHANGE UP (ref 3.5–5)
PROT SERPL-MCNC: 5.6 G/DL — LOW (ref 6–8)
RBC # BLD: 4.6 M/UL — LOW (ref 4.7–6.1)
RBC # FLD: 26.7 % — HIGH (ref 11.5–14.5)
SODIUM SERPL-SCNC: 138 MMOL/L — SIGNIFICANT CHANGE UP (ref 135–146)
WBC # BLD: 7.53 K/UL — SIGNIFICANT CHANGE UP (ref 4.8–10.8)
WBC # FLD AUTO: 7.53 K/UL — SIGNIFICANT CHANGE UP (ref 4.8–10.8)

## 2020-03-14 PROCEDURE — 99233 SBSQ HOSP IP/OBS HIGH 50: CPT

## 2020-03-14 RX ADMIN — NYSTATIN CREAM 1 APPLICATION(S): 100000 CREAM TOPICAL at 05:27

## 2020-03-14 RX ADMIN — Medication 2: at 17:25

## 2020-03-14 RX ADMIN — Medication 81 MILLIGRAM(S): at 11:53

## 2020-03-14 RX ADMIN — TAMSULOSIN HYDROCHLORIDE 0.4 MILLIGRAM(S): 0.4 CAPSULE ORAL at 22:50

## 2020-03-14 RX ADMIN — HEPARIN SODIUM 5000 UNIT(S): 5000 INJECTION INTRAVENOUS; SUBCUTANEOUS at 15:16

## 2020-03-14 RX ADMIN — MIDODRINE HYDROCHLORIDE 5 MILLIGRAM(S): 2.5 TABLET ORAL at 22:50

## 2020-03-14 RX ADMIN — MIDODRINE HYDROCHLORIDE 5 MILLIGRAM(S): 2.5 TABLET ORAL at 05:25

## 2020-03-14 RX ADMIN — Medication 1 APPLICATION(S): at 05:27

## 2020-03-14 RX ADMIN — HEPARIN SODIUM 5000 UNIT(S): 5000 INJECTION INTRAVENOUS; SUBCUTANEOUS at 05:26

## 2020-03-14 RX ADMIN — PANTOPRAZOLE SODIUM 40 MILLIGRAM(S): 20 TABLET, DELAYED RELEASE ORAL at 22:50

## 2020-03-14 RX ADMIN — Medication 50 MILLIGRAM(S): at 05:26

## 2020-03-14 RX ADMIN — Medication 1 APPLICATION(S): at 17:26

## 2020-03-14 RX ADMIN — Medication 6 UNIT(S): at 11:51

## 2020-03-14 RX ADMIN — NYSTATIN CREAM 1 APPLICATION(S): 100000 CREAM TOPICAL at 15:17

## 2020-03-14 RX ADMIN — DULOXETINE HYDROCHLORIDE 90 MILLIGRAM(S): 30 CAPSULE, DELAYED RELEASE ORAL at 11:53

## 2020-03-14 RX ADMIN — Medication 0.12 MILLIGRAM(S): at 05:25

## 2020-03-14 RX ADMIN — MIDODRINE HYDROCHLORIDE 5 MILLIGRAM(S): 2.5 TABLET ORAL at 15:17

## 2020-03-14 RX ADMIN — ATORVASTATIN CALCIUM 40 MILLIGRAM(S): 80 TABLET, FILM COATED ORAL at 22:50

## 2020-03-14 RX ADMIN — Medication 4: at 11:51

## 2020-03-14 RX ADMIN — HEPARIN SODIUM 5000 UNIT(S): 5000 INJECTION INTRAVENOUS; SUBCUTANEOUS at 22:28

## 2020-03-14 RX ADMIN — NYSTATIN CREAM 1 APPLICATION(S): 100000 CREAM TOPICAL at 22:50

## 2020-03-14 RX ADMIN — Medication 6 UNIT(S): at 17:25

## 2020-03-14 NOTE — PROGRESS NOTE ADULT - ATTENDING COMMENTS
I saw and examined the patient independently. I agree with above history, physical exam and plan of care which I have reviewed and edited where appropriate with following additions.     patient reports no new complaints except thinks he is deconditioning and wants to walk with PT.       HFrEF with malfunctioning AICD lead:   c/w tele.   euvolemic.   CT surgery plan for AICD LV lead revision procedure on Monday.   c/w current meds.     sepsis due to acute cholecystitis:   sepsis resolved.   completed ABX.   sp percutaneous drainage/drain with minimal Output noted today   fu IR for removal of drain.    PT eval     dvt ppx.     Progress note Handoff:   Pending: ICD lead revision on Monday.   Discussion: plan of care with  patient /  satisfied- all questions answered.  Disposition: unknown at this time.

## 2020-03-14 NOTE — PROGRESS NOTE ADULT - SUBJECTIVE AND OBJECTIVE BOX
Hospital Day:  22d    Subjective:    Patient is a 64y old  Male who presents with a chief complaint of Altered mental status (13 Mar 2020 10:42)    Seen and examined at bedside. Reported no acute complaints. Stated feeling well and aware of plan to wait until Monday for CT surgery to correct lead malfunction. Remainder of review of systems otherwise negative.     Telemetry reviewed: Sinus 80s with no events  Past Medical Hx:   Diabetic neuropathy  STEMI (ST elevation myocardial infarction)  Diverticulitis  MRSA bacteremia  History of celiac disease  CHF (congestive heart failure)  HTN (hypertension)  Diabetes  Blood clot due to device, implant, or graft  CKD (chronic kidney disease) stage 2, GFR 60-89 ml/min  COPD, moderate  Hyperkalemia  HLD (hyperlipidemia)  Chronic systolic CHF (congestive heart failure)  Osteoarthritis  HTN (hypertension)  Type 2 diabetes mellitus with neurological manifestations  Type 2 diabetes mellitus  History of surgery to heart and great vessels, presenting hazards to health  Atherosclerosis of coronary artery  Status post percutaneous transluminal coronary angioplasty    Past Sx:  History of open reduction and internal fixation (ORIF) procedure  Surgery, elective  Surgery, elective  S/P TKR (total knee replacement), right  S/P CABG x 1  Stented coronary artery  History of surgery to major organs, presenting hazards to health  Other postprocedural status    Allergies:  ACE inhibitors (Hives)  enalapril (Hives)    Current Meds:   Standng Meds:  aspirin enteric coated 81 milliGRAM(s) Oral daily  atorvastatin Oral Tab/Cap - Peds 40 milliGRAM(s) Oral daily  BACItracin   Ointment 1 Application(s) Topical two times a day  chlorhexidine 4% Liquid 1 Application(s) Topical <User Schedule>  dextrose 5%. 1000 milliLiter(s) (50 mL/Hr) IV Continuous <Continuous>  dextrose 50% Injectable 12.5 Gram(s) IV Push once  dextrose 50% Injectable 25 Gram(s) IV Push once  dextrose 50% Injectable 25 Gram(s) IV Push once  digoxin     Tablet 0.125 milliGRAM(s) Oral daily  DULoxetine 90 milliGRAM(s) Oral daily  heparin  Injectable 5000 Unit(s) SubCutaneous every 8 hours  insulin glargine Injectable (LANTUS) 20 Unit(s) SubCutaneous at bedtime  insulin lispro (HumaLOG) corrective regimen sliding scale   SubCutaneous three times a day before meals  insulin lispro Injectable (HumaLOG) 6 Unit(s) SubCutaneous three times a day before meals  metoprolol succinate ER 50 milliGRAM(s) Oral daily  midodrine. 5 milliGRAM(s) Oral every 8 hours  nystatin Powder 1 Application(s) Topical every 8 hours  pantoprazole    Tablet 40 milliGRAM(s) Oral at bedtime  tamsulosin Oral Tab/Cap - Peds 0.4 milliGRAM(s) Oral at bedtime    PRN Meds:  acetaminophen   Tablet .. 650 milliGRAM(s) Oral every 6 hours PRN Temp greater or equal to 38C (100.4F), Moderate Pain (4 - 6)  aluminum hydroxide/magnesium hydroxide/simethicone Suspension 30 milliLiter(s) Oral every 6 hours PRN Dyspepsia  dextrose 40% Gel 15 Gram(s) Oral once PRN Blood Glucose LESS THAN 70 milliGRAM(s)/deciliter  glucagon  Injectable 1 milliGRAM(s) IntraMuscular once PRN Glucose LESS THAN 70 milligrams/deciliter  ondansetron Injectable 4 milliGRAM(s) IV Push once PRN Nausea and/or Vomiting    HOME MEDICATIONS:  aspirin 81 mg oral delayed release tablet: 1 tab(s) orally once a day  atorvastatin 40 mg oral tablet: 1 tab(s) orally once a day  digoxin 125 mcg (0.125 mg) oral tablet: 1 tab(s) orally once a day  DULoxetine 30 mg oral delayed release capsule: 3 cap(s) orally once a day  Jardiance:   Metoprolol Succinate ER 50 mg oral tablet, extended release: 3 tab(s) orally once a day  pantoprazole 40 mg oral delayed release tablet: 1 tab(s) orally once a day  prasugrel 10 mg oral tablet: 1 tab(s) orally once a day  tamsulosin 0.4 mg oral capsule: 1 cap(s) orally once a day (at bedtime)  Trulicity Pen: subcutaneous once a week      Vital Signs:   T(F): 97.1 (03-14-20 @ 05:14), Max: 97.8 (03-13-20 @ 14:58)  HR: 65 (03-14-20 @ 05:14) (61 - 78)  BP: 122/65 (03-14-20 @ 05:14) (89/52 - 122/65)  RR: 18 (03-14-20 @ 05:14) (18 - 20)  SpO2: 99% (03-13-20 @ 20:35) (99% - 99%)      03-13-20 @ 07:01  -  03-14-20 @ 07:00  --------------------------------------------------------  IN: 970 mL / OUT: 1266 mL / NET: -296 mL    Physical Exam:   GENERAL: NAD, Pleasant and conversive, appearing stated age   HEENT: NCAT, PERRLA, EOMI  CHEST/LUNG: Clear to auscultation bilaterally, no wheezing, rhonchi, rales  HEART: Regular rate and rhythm; s1 s2 appreciated, No murmurs, rubs, or gallops  ABDOMEN: Soft, Nontender, Nondistended; Bowel sounds present  EXTREMITIES: No LE edema b/l, Peripheral pusles 2+, No cyanosis, No clubbing  NERVOUS SYSTEM:  Alert & Oriented X3, Non focal  LINES/CATHETERS: Per Choley drain    Labs:                         10.8   7.53  )-----------( 277      ( 14 Mar 2020 06:53 )             34.9     Neutophil% 70.9, Lymphocyte% 15.5, Monocyte% 8.5, Bands% 0.3 03-14-20 @ 06:53    14 Mar 2020 06:53    138    |  101    |  19     ----------------------------<  113    4.5     |  28     |  0.7      Ca    8.5        14 Mar 2020 06:53  Mg     1.6       14 Mar 2020 06:53    TPro  5.6    /  Alb  2.8    /  TBili  1.0    /  DBili  x      /  AST  31     /  ALT  34     /  AlkPhos  185    14 Mar 2020 06:53    Hemoglobin A1C, Whole Blood: 9.1 % (01-17-20 @ 06:27)    Radiology:   None Today    Assessment and Plan:   This is a 64 year old male with PMHx of HTN, DLD, CAD s/p CABG, HFrEF (ef 25-30% on 2/2020) s/p [facility] BiV placement in 2/2020, Diabetes on insulin pummp, Pulmonary HTN, Celiacs, Diverticulitis, Right Hip Fracture s/p ORIF who presented with metabolic encephalopathy.     #CHFrEF (EF 25-30%),   - Noted recent BiV AICD malfunctioning.   - EPS aware; plan for CT surgery to do lead replacement on Monday  - Continue on Digoxin 0.125 and metoprolol 50mg daily    #sepsis (resolved) secondary to acute cholecystitis   - Noted hydropic gallbladder with stones and sludge.   - S/p perc choley with approximately 75cc output  - IR contacted for follow up     #CAD s/p CABG and PCI  - Continue with ASA, Atorvastatin, and Metoprolol  - Prasugrel today and hold starting Saturday for CT surgery on Monday     #Celiacs  - Continue on gluten restricted diet    #Type 2 DM  - Continue to monitor finger sticks  POCT Blood Glucose.: 98 mg/dL (14 Mar 2020 07:43)  POCT Blood Glucose.: 99 mg/dL (13 Mar 2020 22:12)  POCT Blood Glucose.: 159 mg/dL (13 Mar 2020 16:47)  POCT Blood Glucose.: 131 mg/dL (13 Mar 2020 11:32)  - Continue on Insulin regimen: Lantus 20, Humalog 6/6/6    #HTN (stable)    #Anxiety  - Continue on Cymbalta     #Skin graft  - Wound care as per burn team    Activity; As tolerated  Diet: Carb Consistent DASH diet  DVT ppx: heparin Sub Q  GI ppx: Protonix  Code Status: Full Code  DISPO: From [facility], Pending Lead replacement with CTS on Monday

## 2020-03-15 LAB
ALBUMIN SERPL ELPH-MCNC: 3 G/DL — LOW (ref 3.5–5.2)
ALP SERPL-CCNC: 180 U/L — HIGH (ref 30–115)
ALT FLD-CCNC: 40 U/L — SIGNIFICANT CHANGE UP (ref 0–41)
ANION GAP SERPL CALC-SCNC: 10 MMOL/L — SIGNIFICANT CHANGE UP (ref 7–14)
AST SERPL-CCNC: 36 U/L — SIGNIFICANT CHANGE UP (ref 0–41)
BASOPHILS # BLD AUTO: 0.05 K/UL — SIGNIFICANT CHANGE UP (ref 0–0.2)
BASOPHILS NFR BLD AUTO: 0.7 % — SIGNIFICANT CHANGE UP (ref 0–1)
BILIRUB SERPL-MCNC: 1.1 MG/DL — SIGNIFICANT CHANGE UP (ref 0.2–1.2)
BUN SERPL-MCNC: 20 MG/DL — SIGNIFICANT CHANGE UP (ref 10–20)
CALCIUM SERPL-MCNC: 8.5 MG/DL — SIGNIFICANT CHANGE UP (ref 8.5–10.1)
CHLORIDE SERPL-SCNC: 99 MMOL/L — SIGNIFICANT CHANGE UP (ref 98–110)
CO2 SERPL-SCNC: 27 MMOL/L — SIGNIFICANT CHANGE UP (ref 17–32)
CREAT SERPL-MCNC: 0.7 MG/DL — SIGNIFICANT CHANGE UP (ref 0.7–1.5)
EOSINOPHIL # BLD AUTO: 0.25 K/UL — SIGNIFICANT CHANGE UP (ref 0–0.7)
EOSINOPHIL NFR BLD AUTO: 3.4 % — SIGNIFICANT CHANGE UP (ref 0–8)
GLUCOSE BLDC GLUCOMTR-MCNC: 131 MG/DL — HIGH (ref 70–99)
GLUCOSE BLDC GLUCOMTR-MCNC: 176 MG/DL — HIGH (ref 70–99)
GLUCOSE BLDC GLUCOMTR-MCNC: 191 MG/DL — HIGH (ref 70–99)
GLUCOSE BLDC GLUCOMTR-MCNC: 246 MG/DL — HIGH (ref 70–99)
GLUCOSE SERPL-MCNC: 192 MG/DL — HIGH (ref 70–99)
HCT VFR BLD CALC: 35 % — LOW (ref 42–52)
HGB BLD-MCNC: 10.4 G/DL — LOW (ref 14–18)
IMM GRANULOCYTES NFR BLD AUTO: 0.3 % — SIGNIFICANT CHANGE UP (ref 0.1–0.3)
LYMPHOCYTES # BLD AUTO: 0.89 K/UL — LOW (ref 1.2–3.4)
LYMPHOCYTES # BLD AUTO: 12.1 % — LOW (ref 20.5–51.1)
MAGNESIUM SERPL-MCNC: 1.7 MG/DL — LOW (ref 1.8–2.4)
MCHC RBC-ENTMCNC: 22.6 PG — LOW (ref 27–31)
MCHC RBC-ENTMCNC: 29.7 G/DL — LOW (ref 32–37)
MCV RBC AUTO: 76.1 FL — LOW (ref 80–94)
MONOCYTES # BLD AUTO: 0.67 K/UL — HIGH (ref 0.1–0.6)
MONOCYTES NFR BLD AUTO: 9.1 % — SIGNIFICANT CHANGE UP (ref 1.7–9.3)
NEUTROPHILS # BLD AUTO: 5.48 K/UL — SIGNIFICANT CHANGE UP (ref 1.4–6.5)
NEUTROPHILS NFR BLD AUTO: 74.4 % — SIGNIFICANT CHANGE UP (ref 42.2–75.2)
NRBC # BLD: 0 /100 WBCS — SIGNIFICANT CHANGE UP (ref 0–0)
PLATELET # BLD AUTO: 283 K/UL — SIGNIFICANT CHANGE UP (ref 130–400)
POTASSIUM SERPL-MCNC: 4.8 MMOL/L — SIGNIFICANT CHANGE UP (ref 3.5–5)
POTASSIUM SERPL-SCNC: 4.8 MMOL/L — SIGNIFICANT CHANGE UP (ref 3.5–5)
PROT SERPL-MCNC: 5.9 G/DL — LOW (ref 6–8)
RBC # BLD: 4.6 M/UL — LOW (ref 4.7–6.1)
RBC # FLD: 27.2 % — HIGH (ref 11.5–14.5)
SODIUM SERPL-SCNC: 136 MMOL/L — SIGNIFICANT CHANGE UP (ref 135–146)
WBC # BLD: 7.36 K/UL — SIGNIFICANT CHANGE UP (ref 4.8–10.8)
WBC # FLD AUTO: 7.36 K/UL — SIGNIFICANT CHANGE UP (ref 4.8–10.8)

## 2020-03-15 PROCEDURE — 99233 SBSQ HOSP IP/OBS HIGH 50: CPT

## 2020-03-15 RX ORDER — PRASUGREL 5 MG/1
10 TABLET, FILM COATED ORAL DAILY
Refills: 0 | Status: DISCONTINUED | OUTPATIENT
Start: 2020-03-15 | End: 2020-03-19

## 2020-03-15 RX ORDER — MAGNESIUM OXIDE 400 MG ORAL TABLET 241.3 MG
400 TABLET ORAL
Refills: 0 | Status: COMPLETED | OUTPATIENT
Start: 2020-03-15 | End: 2020-03-15

## 2020-03-15 RX ORDER — MIDODRINE HYDROCHLORIDE 2.5 MG/1
10 TABLET ORAL THREE TIMES A DAY
Refills: 0 | Status: DISCONTINUED | OUTPATIENT
Start: 2020-03-15 | End: 2020-03-19

## 2020-03-15 RX ADMIN — Medication 1 APPLICATION(S): at 05:16

## 2020-03-15 RX ADMIN — MIDODRINE HYDROCHLORIDE 5 MILLIGRAM(S): 2.5 TABLET ORAL at 14:31

## 2020-03-15 RX ADMIN — MIDODRINE HYDROCHLORIDE 10 MILLIGRAM(S): 2.5 TABLET ORAL at 18:29

## 2020-03-15 RX ADMIN — Medication 2: at 18:27

## 2020-03-15 RX ADMIN — CHLORHEXIDINE GLUCONATE 1 APPLICATION(S): 213 SOLUTION TOPICAL at 05:16

## 2020-03-15 RX ADMIN — HEPARIN SODIUM 5000 UNIT(S): 5000 INJECTION INTRAVENOUS; SUBCUTANEOUS at 05:16

## 2020-03-15 RX ADMIN — PRASUGREL 10 MILLIGRAM(S): 5 TABLET, FILM COATED ORAL at 18:29

## 2020-03-15 RX ADMIN — Medication 50 MILLIGRAM(S): at 05:16

## 2020-03-15 RX ADMIN — MAGNESIUM OXIDE 400 MG ORAL TABLET 400 MILLIGRAM(S): 241.3 TABLET ORAL at 18:30

## 2020-03-15 RX ADMIN — Medication 81 MILLIGRAM(S): at 12:01

## 2020-03-15 RX ADMIN — Medication 6 UNIT(S): at 08:19

## 2020-03-15 RX ADMIN — Medication 6 UNIT(S): at 12:00

## 2020-03-15 RX ADMIN — HEPARIN SODIUM 5000 UNIT(S): 5000 INJECTION INTRAVENOUS; SUBCUTANEOUS at 14:31

## 2020-03-15 RX ADMIN — TAMSULOSIN HYDROCHLORIDE 0.4 MILLIGRAM(S): 0.4 CAPSULE ORAL at 21:32

## 2020-03-15 RX ADMIN — Medication 2: at 08:19

## 2020-03-15 RX ADMIN — ATORVASTATIN CALCIUM 40 MILLIGRAM(S): 80 TABLET, FILM COATED ORAL at 21:32

## 2020-03-15 RX ADMIN — NYSTATIN CREAM 1 APPLICATION(S): 100000 CREAM TOPICAL at 05:17

## 2020-03-15 RX ADMIN — MAGNESIUM OXIDE 400 MG ORAL TABLET 400 MILLIGRAM(S): 241.3 TABLET ORAL at 12:03

## 2020-03-15 RX ADMIN — HEPARIN SODIUM 5000 UNIT(S): 5000 INJECTION INTRAVENOUS; SUBCUTANEOUS at 21:31

## 2020-03-15 RX ADMIN — Medication 0.12 MILLIGRAM(S): at 05:16

## 2020-03-15 RX ADMIN — PANTOPRAZOLE SODIUM 40 MILLIGRAM(S): 20 TABLET, DELAYED RELEASE ORAL at 21:31

## 2020-03-15 RX ADMIN — INSULIN GLARGINE 20 UNIT(S): 100 INJECTION, SOLUTION SUBCUTANEOUS at 21:33

## 2020-03-15 RX ADMIN — Medication 6 UNIT(S): at 18:27

## 2020-03-15 RX ADMIN — NYSTATIN CREAM 1 APPLICATION(S): 100000 CREAM TOPICAL at 14:31

## 2020-03-15 RX ADMIN — DULOXETINE HYDROCHLORIDE 90 MILLIGRAM(S): 30 CAPSULE, DELAYED RELEASE ORAL at 12:01

## 2020-03-15 RX ADMIN — Medication 1 APPLICATION(S): at 18:30

## 2020-03-15 RX ADMIN — MIDODRINE HYDROCHLORIDE 5 MILLIGRAM(S): 2.5 TABLET ORAL at 05:16

## 2020-03-15 RX ADMIN — NYSTATIN CREAM 1 APPLICATION(S): 100000 CREAM TOPICAL at 21:37

## 2020-03-15 RX ADMIN — Medication 4: at 12:00

## 2020-03-15 NOTE — PROGRESS NOTE ADULT - SUBJECTIVE AND OBJECTIVE BOX
Progress Note:  Provider Speciality                            Hospitalist      FLOWER MARISCAL [id number] 64y Male     CHIEF PRESENTING COMPLAINT:  Patient is a 64y old  Male who presents with a chief complaint of Altered mental status (14 Mar 2020 08:23)        SUBJECTIVE:  Patient was seen and examined at bedside.   Reports no new complaints except he wants to walk with PT  No significant overnight events reported.     HISTORY OF PRESENTING ILLNESS:  HPI:  [64 year old gentleman]    CC: Altered mental status     PM/SH: HTN, DLD, CAD s/p CABG, HFrEF (25-30% on ECHO 2/2020, 20-25% on MUGA scan in 12/2019) s/p [facility] kali BiV placement 2/2020, DM (on insulin pump), pulmonary hypertension, Celiac disease, diverticulitis, s/p right total knee replacement October 2019 complicated by infection s/p abx course w/ PICC and  right thigh with skin graft, right hip fracture s/p ORIF 2/2020     History of Present Illness goes back to the day of admission. The patient as send here from [facility] where he was for rehab after his recent ORIF. Per the records, patient was agitated and combative with staff. Patient was claiming "there was a person sneaking around by his room and he wanted the staff from NH to call the Police." Patient also stated he was scared so he was screaming.  Per the patient he does not know exactly what happened. He said he realized he was acting weird but was not aware of why. He said he got into confrontations with the nursing staff, the police and the EMS because they were being aggressive towards him.   Patient otherwise denies any hallucination/ HA/dizziness/chest pain/sob/abd pain/n/v/d.    In the ED, vitals were stable. Labs showed elevated WBC (it has been before). Na was 130 (became low after last admission), K was 5.2, creatinine was 1.9 from 0.8. Lactate was 2.4. UA was grossly positive. Per the ED team and signout and patient fields was changed and sample was from urine. Per their notes, the patient refused fields removal. (21 Feb 2020 04:11)        REVIEW OF SYSTEMS:    At least 10 systems were reviewed in ROS. All systems reviewed  are within normal limits except for the complaints as described in Subjective.    PAST MEDICAL & SURGICAL HISTORY:  PAST MEDICAL & SURGICAL HISTORY:  Diabetic neuropathy  STEMI (ST elevation myocardial infarction)  Diverticulitis  MRSA bacteremia  History of celiac disease  CHF (congestive heart failure): EF ~ 25%  HTN (hypertension)  Diabetes: on insulin pump  Blood clot due to device, implant, or graft: was on blood thinners  HLD (hyperlipidemia)  Osteoarthritis  Atherosclerosis of coronary artery: CAD (coronary artery disease)  Status post percutaneous transluminal coronary angioplasty: in 2012  History of open reduction and internal fixation (ORIF) procedure  Surgery, elective: Right shoulder  Surgery, elective: right knee wound debridement  S/P TKR (total knee replacement), right: with infection Mrsa   per pt he was cleared from MRSA infection  S/P CABG x 1: 2018  Stented coronary artery: 10/18 heart attack  INFERIOR WALL MI  Other postprocedural status: Fixation hardware in foot LEFT          VITAL SIGNS:  Vital Signs Last 24 Hrs  T(C): 36.3 (15 Mar 2020 14:30), Max: 36.6 (14 Mar 2020 20:00)  T(F): 97.4 (15 Mar 2020 14:30), Max: 97.9 (14 Mar 2020 20:00)  HR: 60 (15 Mar 2020 14:30) (60 - 80)  BP: 87/52 (15 Mar 2020 14:30) (87/52 - 128/73)  BP(mean): --  RR: 18 (15 Mar 2020 14:30) (18 - 18)  SpO2: 97% (15 Mar 2020 14:59) (97% - 99%)          PHYSICAL EXAMINATION:    General: AAO, Not in acute distress  HEENT:   EOMI, NO JVD, atraumatic  Heart: S1+S2 audible, no murmur  Lungs: bilateral  fair air entry, no wheezing, no crepitations.  Abdomen: Soft, non-tender, non-distended /R abdominal sudhir drain with minimal OP.  CNS: AAO  , no focal deficits.  Extremities:  No edema            CONSULTS:  Consultant(s) Notes Reviewed by me.   Care Discussed with Consultants/Other Providers where required.        MEDICATIONS:  MEDICATIONS  (STANDING):  aspirin enteric coated 81 milliGRAM(s) Oral daily  atorvastatin Oral Tab/Cap - Peds 40 milliGRAM(s) Oral daily  BACItracin   Ointment 1 Application(s) Topical two times a day  chlorhexidine 4% Liquid 1 Application(s) Topical <User Schedule>  dextrose 5%. 1000 milliLiter(s) (50 mL/Hr) IV Continuous <Continuous>  dextrose 50% Injectable 12.5 Gram(s) IV Push once  dextrose 50% Injectable 25 Gram(s) IV Push once  dextrose 50% Injectable 25 Gram(s) IV Push once  digoxin     Tablet 0.125 milliGRAM(s) Oral daily  DULoxetine 90 milliGRAM(s) Oral daily  heparin  Injectable 5000 Unit(s) SubCutaneous every 8 hours  insulin glargine Injectable (LANTUS) 20 Unit(s) SubCutaneous at bedtime  insulin lispro (HumaLOG) corrective regimen sliding scale   SubCutaneous three times a day before meals  insulin lispro Injectable (HumaLOG) 6 Unit(s) SubCutaneous three times a day before meals  magnesium oxide 400 milliGRAM(s) Oral three times a day with meals  metoprolol succinate ER 50 milliGRAM(s) Oral daily  midodrine. 5 milliGRAM(s) Oral every 8 hours  nystatin Powder 1 Application(s) Topical every 8 hours  pantoprazole    Tablet 40 milliGRAM(s) Oral at bedtime  silver sulfADIAZINE 1% Cream 1 Application(s) Topical two times a day  tamsulosin Oral Tab/Cap - Peds 0.4 milliGRAM(s) Oral at bedtime    MEDICATIONS  (PRN):  acetaminophen   Tablet .. 650 milliGRAM(s) Oral every 6 hours PRN Temp greater or equal to 38C (100.4F), Moderate Pain (4 - 6)  aluminum hydroxide/magnesium hydroxide/simethicone Suspension 30 milliLiter(s) Oral every 6 hours PRN Dyspepsia  dextrose 40% Gel 15 Gram(s) Oral once PRN Blood Glucose LESS THAN 70 milliGRAM(s)/deciliter  glucagon  Injectable 1 milliGRAM(s) IntraMuscular once PRN Glucose LESS THAN 70 milligrams/deciliter  ondansetron Injectable 4 milliGRAM(s) IV Push once PRN Nausea and/or Vomiting      LABOROTORY DATA/MICROBIOLOGY/I & O's:                        10.4   7.36  )-----------( 283      ( 15 Mar 2020 05:53 )             35.0     03-15    136  |  99  |  20  ----------------------------<  192<H>  4.8   |  27  |  0.7    Ca    8.5      15 Mar 2020 05:53  Mg     1.7     03-15    TPro  5.9<L>  /  Alb  3.0<L>  /  TBili  1.1  /  DBili  x   /  AST  36  /  ALT  40  /  AlkPhos  180<H>  03-15        CAPILLARY BLOOD GLUCOSE      POCT Blood Glucose.: 246 mg/dL (15 Mar 2020 11:47)  POCT Blood Glucose.: 176 mg/dL (15 Mar 2020 07:54)  POCT Blood Glucose.: 192 mg/dL (14 Mar 2020 21:10)  POCT Blood Glucose.: 164 mg/dL (14 Mar 2020 16:38)                03-14-20 @ 07:01  -  03-15-20 @ 07:00  --------------------------------------------------------  IN: 330 mL / OUT: 480 mL / NET: -150 mL    03-15-20 @ 07:01  -  03-15-20 @ 15:14  --------------------------------------------------------  IN: 0 mL / OUT: 30 mL / NET: -30 mL              ASSESSMENT/Plan:    This is a 64 year old male with PMHx of HTN, DLD, CAD s/p CABG, HFrEF (ef 25-30% on 2/2020) s/p [facility] BiV placement in 2/2020, Diabetes on insulin pummp, Pulmonary HTN, Celiacs, Diverticulitis, Right Hip Fracture s/p ORIF who presented with metabolic encephalopathy.     HFrEF with malfunctioning AICD lead:   c/w tele.   euvolemic.   CT surgery plan for AICD LV lead revision procedure on Monday.   c/w current meds.     sepsis due to acute cholecystitis:   sepsis resolved.   completed ABX.   sp percutaneous drainage/drain with minimal Output noted today   fu IR for removal of drain.    #CAD s/p CABG and PCI  - Continue with ASA, Atorvastatin, and Metoprolol  - Prasugrel on hold for ICD lead revision procedure tomorrow.    #Celiacs dz:   - Continue on gluten restricted diet    #Type 2 DM  - Continue to monitor finger sticks      #HTN :   BP on lower side.   increased midodrine dose to 10mg tid/monitor response   c/w toprol /add holding parameters.     #Anxiety  - Continue on Cymbalta     #Skin graft  - Wound care as per burn team        PT eval     dvt ppx.     Progress note Handoff:   Pending: ICD lead revision on Monday/IR fu for sudhir drain removal   Discussion: plan of care with  patient /  satisfied- all questions answered.  Disposition: unknown at this time.

## 2020-03-15 NOTE — PROGRESS NOTE ADULT - SUBJECTIVE AND OBJECTIVE BOX
CTS Attending    Patient known to my service  Has BiV ICD which Dr. Portillo interrogated and found loss of capture on LV lead    I had planned to revise the lead, and asked Dr. Anguiano to review the programming and lead properties prior to scheduling surgery.  Dr. Anguiano found alternative parameters that can be used and thus avoid surgery.    I will review these with Dr. Garcia tomorrow.    Please continue the Effient, as surgery is cancelled for now.

## 2020-03-15 NOTE — CHART NOTE - NSCHARTNOTEFT_GEN_A_CORE
Per CTS: lead revision on hold as ICD settings recently adjusted by EPS may negate need for lead revision. Will re-evaluate ICD and need for lead revision on Monday in consultation with EPS. Effient restarted at this time. Follow-up EPS/CTS on Monday.

## 2020-03-16 LAB
ALBUMIN SERPL ELPH-MCNC: 3.2 G/DL — LOW (ref 3.5–5.2)
ALP SERPL-CCNC: 199 U/L — HIGH (ref 30–115)
ALT FLD-CCNC: 38 U/L — SIGNIFICANT CHANGE UP (ref 0–41)
ANION GAP SERPL CALC-SCNC: 10 MMOL/L — SIGNIFICANT CHANGE UP (ref 7–14)
ANION GAP SERPL CALC-SCNC: 11 MMOL/L — SIGNIFICANT CHANGE UP (ref 7–14)
ANION GAP SERPL CALC-SCNC: 7 MMOL/L — SIGNIFICANT CHANGE UP (ref 7–14)
APTT BLD: 29.4 SEC — SIGNIFICANT CHANGE UP (ref 27–39.2)
AST SERPL-CCNC: 29 U/L — SIGNIFICANT CHANGE UP (ref 0–41)
BASOPHILS # BLD AUTO: 0.06 K/UL — SIGNIFICANT CHANGE UP (ref 0–0.2)
BASOPHILS NFR BLD AUTO: 0.8 % — SIGNIFICANT CHANGE UP (ref 0–1)
BILIRUB SERPL-MCNC: 1 MG/DL — SIGNIFICANT CHANGE UP (ref 0.2–1.2)
BUN SERPL-MCNC: 19 MG/DL — SIGNIFICANT CHANGE UP (ref 10–20)
BUN SERPL-MCNC: 20 MG/DL — SIGNIFICANT CHANGE UP (ref 10–20)
BUN SERPL-MCNC: 21 MG/DL — HIGH (ref 10–20)
CALCIUM SERPL-MCNC: 8.3 MG/DL — LOW (ref 8.5–10.1)
CALCIUM SERPL-MCNC: 8.8 MG/DL — SIGNIFICANT CHANGE UP (ref 8.5–10.1)
CALCIUM SERPL-MCNC: 8.9 MG/DL — SIGNIFICANT CHANGE UP (ref 8.5–10.1)
CHLORIDE SERPL-SCNC: 102 MMOL/L — SIGNIFICANT CHANGE UP (ref 98–110)
CO2 SERPL-SCNC: 26 MMOL/L — SIGNIFICANT CHANGE UP (ref 17–32)
CO2 SERPL-SCNC: 29 MMOL/L — SIGNIFICANT CHANGE UP (ref 17–32)
CO2 SERPL-SCNC: 30 MMOL/L — SIGNIFICANT CHANGE UP (ref 17–32)
CREAT SERPL-MCNC: 0.7 MG/DL — SIGNIFICANT CHANGE UP (ref 0.7–1.5)
CREAT SERPL-MCNC: 0.7 MG/DL — SIGNIFICANT CHANGE UP (ref 0.7–1.5)
CREAT SERPL-MCNC: 0.8 MG/DL — SIGNIFICANT CHANGE UP (ref 0.7–1.5)
DIGOXIN SERPL-MCNC: 1 NG/ML — SIGNIFICANT CHANGE UP (ref 0.8–2)
EOSINOPHIL # BLD AUTO: 0.25 K/UL — SIGNIFICANT CHANGE UP (ref 0–0.7)
EOSINOPHIL NFR BLD AUTO: 3.5 % — SIGNIFICANT CHANGE UP (ref 0–8)
GLUCOSE BLDC GLUCOMTR-MCNC: 105 MG/DL — HIGH (ref 70–99)
GLUCOSE BLDC GLUCOMTR-MCNC: 114 MG/DL — HIGH (ref 70–99)
GLUCOSE BLDC GLUCOMTR-MCNC: 142 MG/DL — HIGH (ref 70–99)
GLUCOSE BLDC GLUCOMTR-MCNC: 153 MG/DL — HIGH (ref 70–99)
GLUCOSE SERPL-MCNC: 102 MG/DL — HIGH (ref 70–99)
GLUCOSE SERPL-MCNC: 147 MG/DL — HIGH (ref 70–99)
GLUCOSE SERPL-MCNC: 152 MG/DL — HIGH (ref 70–99)
HCT VFR BLD CALC: 38.9 % — LOW (ref 42–52)
HGB BLD-MCNC: 11.4 G/DL — LOW (ref 14–18)
IMM GRANULOCYTES NFR BLD AUTO: 0.3 % — SIGNIFICANT CHANGE UP (ref 0.1–0.3)
INR BLD: 1.12 RATIO — SIGNIFICANT CHANGE UP (ref 0.65–1.3)
LYMPHOCYTES # BLD AUTO: 0.98 K/UL — LOW (ref 1.2–3.4)
LYMPHOCYTES # BLD AUTO: 13.8 % — LOW (ref 20.5–51.1)
MAGNESIUM SERPL-MCNC: 1.8 MG/DL — SIGNIFICANT CHANGE UP (ref 1.8–2.4)
MCHC RBC-ENTMCNC: 22.4 PG — LOW (ref 27–31)
MCHC RBC-ENTMCNC: 29.3 G/DL — LOW (ref 32–37)
MCV RBC AUTO: 76.6 FL — LOW (ref 80–94)
MONOCYTES # BLD AUTO: 0.62 K/UL — HIGH (ref 0.1–0.6)
MONOCYTES NFR BLD AUTO: 8.7 % — SIGNIFICANT CHANGE UP (ref 1.7–9.3)
NEUTROPHILS # BLD AUTO: 5.16 K/UL — SIGNIFICANT CHANGE UP (ref 1.4–6.5)
NEUTROPHILS NFR BLD AUTO: 72.9 % — SIGNIFICANT CHANGE UP (ref 42.2–75.2)
NRBC # BLD: 0 /100 WBCS — SIGNIFICANT CHANGE UP (ref 0–0)
PLATELET # BLD AUTO: 293 K/UL — SIGNIFICANT CHANGE UP (ref 130–400)
POTASSIUM SERPL-MCNC: 4.8 MMOL/L — SIGNIFICANT CHANGE UP (ref 3.5–5)
POTASSIUM SERPL-MCNC: 4.9 MMOL/L — SIGNIFICANT CHANGE UP (ref 3.5–5)
POTASSIUM SERPL-MCNC: 5.8 MMOL/L — HIGH (ref 3.5–5)
POTASSIUM SERPL-SCNC: 4.8 MMOL/L — SIGNIFICANT CHANGE UP (ref 3.5–5)
POTASSIUM SERPL-SCNC: 4.9 MMOL/L — SIGNIFICANT CHANGE UP (ref 3.5–5)
POTASSIUM SERPL-SCNC: 5.8 MMOL/L — HIGH (ref 3.5–5)
PROT SERPL-MCNC: 6.2 G/DL — SIGNIFICANT CHANGE UP (ref 6–8)
PROTHROM AB SERPL-ACNC: 12.9 SEC — HIGH (ref 9.95–12.87)
RBC # BLD: 5.08 M/UL — SIGNIFICANT CHANGE UP (ref 4.7–6.1)
RBC # FLD: 27 % — HIGH (ref 11.5–14.5)
SODIUM SERPL-SCNC: 139 MMOL/L — SIGNIFICANT CHANGE UP (ref 135–146)
SODIUM SERPL-SCNC: 139 MMOL/L — SIGNIFICANT CHANGE UP (ref 135–146)
SODIUM SERPL-SCNC: 141 MMOL/L — SIGNIFICANT CHANGE UP (ref 135–146)
WBC # BLD: 7.09 K/UL — SIGNIFICANT CHANGE UP (ref 4.8–10.8)
WBC # FLD AUTO: 7.09 K/UL — SIGNIFICANT CHANGE UP (ref 4.8–10.8)

## 2020-03-16 PROCEDURE — 73630 X-RAY EXAM OF FOOT: CPT | Mod: 26,LT

## 2020-03-16 PROCEDURE — 99233 SBSQ HOSP IP/OBS HIGH 50: CPT | Mod: 24

## 2020-03-16 PROCEDURE — 93010 ELECTROCARDIOGRAM REPORT: CPT

## 2020-03-16 RX ORDER — INSULIN HUMAN 100 [IU]/ML
10 INJECTION, SOLUTION SUBCUTANEOUS ONCE
Refills: 0 | Status: COMPLETED | OUTPATIENT
Start: 2020-03-16 | End: 2020-03-16

## 2020-03-16 RX ORDER — DEXTROSE 10 % IN WATER 10 %
250 INTRAVENOUS SOLUTION INTRAVENOUS
Refills: 0 | Status: COMPLETED | OUTPATIENT
Start: 2020-03-16 | End: 2020-03-16

## 2020-03-16 RX ORDER — COLLAGENASE CLOSTRIDIUM HIST. 250 UNIT/G
1 OINTMENT (GRAM) TOPICAL DAILY
Refills: 0 | Status: DISCONTINUED | OUTPATIENT
Start: 2020-03-16 | End: 2020-03-19

## 2020-03-16 RX ADMIN — NYSTATIN CREAM 1 APPLICATION(S): 100000 CREAM TOPICAL at 13:13

## 2020-03-16 RX ADMIN — MIDODRINE HYDROCHLORIDE 10 MILLIGRAM(S): 2.5 TABLET ORAL at 05:20

## 2020-03-16 RX ADMIN — HEPARIN SODIUM 5000 UNIT(S): 5000 INJECTION INTRAVENOUS; SUBCUTANEOUS at 13:12

## 2020-03-16 RX ADMIN — TAMSULOSIN HYDROCHLORIDE 0.4 MILLIGRAM(S): 0.4 CAPSULE ORAL at 22:11

## 2020-03-16 RX ADMIN — ATORVASTATIN CALCIUM 40 MILLIGRAM(S): 80 TABLET, FILM COATED ORAL at 22:13

## 2020-03-16 RX ADMIN — Medication 1 APPLICATION(S): at 05:21

## 2020-03-16 RX ADMIN — MIDODRINE HYDROCHLORIDE 10 MILLIGRAM(S): 2.5 TABLET ORAL at 17:27

## 2020-03-16 RX ADMIN — Medication 81 MILLIGRAM(S): at 12:10

## 2020-03-16 RX ADMIN — HEPARIN SODIUM 5000 UNIT(S): 5000 INJECTION INTRAVENOUS; SUBCUTANEOUS at 22:11

## 2020-03-16 RX ADMIN — PRASUGREL 10 MILLIGRAM(S): 5 TABLET, FILM COATED ORAL at 12:10

## 2020-03-16 RX ADMIN — Medication 6 UNIT(S): at 17:27

## 2020-03-16 RX ADMIN — Medication 1 APPLICATION(S): at 17:27

## 2020-03-16 RX ADMIN — MIDODRINE HYDROCHLORIDE 10 MILLIGRAM(S): 2.5 TABLET ORAL at 12:14

## 2020-03-16 RX ADMIN — Medication 1 APPLICATION(S): at 12:10

## 2020-03-16 RX ADMIN — Medication 6 UNIT(S): at 08:10

## 2020-03-16 RX ADMIN — Medication 1000 MILLILITER(S): at 12:15

## 2020-03-16 RX ADMIN — Medication 2: at 12:09

## 2020-03-16 RX ADMIN — Medication 6 UNIT(S): at 12:09

## 2020-03-16 RX ADMIN — Medication 1 APPLICATION(S): at 05:23

## 2020-03-16 RX ADMIN — PANTOPRAZOLE SODIUM 40 MILLIGRAM(S): 20 TABLET, DELAYED RELEASE ORAL at 22:14

## 2020-03-16 RX ADMIN — INSULIN HUMAN 10 UNIT(S): 100 INJECTION, SOLUTION SUBCUTANEOUS at 13:11

## 2020-03-16 RX ADMIN — HEPARIN SODIUM 5000 UNIT(S): 5000 INJECTION INTRAVENOUS; SUBCUTANEOUS at 05:19

## 2020-03-16 RX ADMIN — DULOXETINE HYDROCHLORIDE 90 MILLIGRAM(S): 30 CAPSULE, DELAYED RELEASE ORAL at 12:10

## 2020-03-16 RX ADMIN — Medication 0.12 MILLIGRAM(S): at 05:20

## 2020-03-16 RX ADMIN — Medication 1 APPLICATION(S): at 17:28

## 2020-03-16 RX ADMIN — NYSTATIN CREAM 1 APPLICATION(S): 100000 CREAM TOPICAL at 22:11

## 2020-03-16 RX ADMIN — NYSTATIN CREAM 1 APPLICATION(S): 100000 CREAM TOPICAL at 05:22

## 2020-03-16 RX ADMIN — Medication 50 MILLIGRAM(S): at 05:22

## 2020-03-16 NOTE — PROGRESS NOTE ADULT - ASSESSMENT
This is a 64 year old male with PMHx of HTN, DLD, CAD s/p CABG, HFrEF (ef 25-30% on 2/2020) s/p [facility] BiV placement in 2/2020, Diabetes on insulin pummp, Pulmonary HTN, Celiacs, Diverticulitis, Right Hip Fracture s/p ORIF who presented with metabolic encephalopathy.     #CHFrEF (EF 25-30%),   - Noted recent BiV AICD malfunctioning.   - EPS aware - reprogrammed device 3/16 - no need for CTS -can FU outpatient  - Continue on Digoxin 0.125 and metoprolol 50mg daily  - digoxin level pending    #sepsis (resolved) secondary to acute cholecystitis   - Noted hydropic gallbladder with stones and sludge.   - S/p perc choley 2/28 with approximately 75cc output  - IR contacted for follow up 3/16: drain will be in at least 6 weeks, after that, will FU with [facility] surgery [phone] for either sx vs tube exchange    #hyperkalemia  - 5.8 this AM no symptoms  - NO EKG changes  - will get dig level  - tx w insulin and dextrose  - repeat BMP 4pm    # L foot 2nd digit pressure ulcer  - XR left foot pending  - ulcer probes to bone  r/o OM  - podiatry on board  - continue local wound care    #CAD s/p CABG and PCI  - Continue with ASA, Atorvastatin, and Metoprolol  - Prasugrel continue    #Celiacs  - Continue on gluten restricted diet    #Type 2 DM  - Continue to monitor finger sticks  - Continue on Insulin regimen: Lantus 20, Humalog 6/6/6    #HTN (stable)    #Anxiety  - Continue on Cymbalta     #Skin graft  - Wound care as per burn team    Activity; As tolerated  Diet: Carb Consistent DASH diet  DVT ppx: heparin Sub Q  GI ppx: Protonix  Code Status: Full Code  DISPO: From [facility], pending xr, hyperK resolution, PT/physiatry

## 2020-03-16 NOTE — CONSULT NOTE ADULT - ATTENDING COMMENTS
Stable ulcer second toe  No surgical intervention from podiatry standpoint  local wound care Iodosorb  X rays reviewed  Podiatry to follow up as needed

## 2020-03-16 NOTE — PROGRESS NOTE ADULT - SUBJECTIVE AND OBJECTIVE BOX
SUBJECTIVE:    Patient is a 64y old Male who presents with a chief complaint of Altered mental status (16 Mar 2020 08:19)    Currently admitted to medicine with the primary diagnosis of Metabolic encephalopathy     Today is hospital day 24d. This morning he is resting comfortably in bed and reports no new issues or overnight events. He denies CP, palpitations, SOB, abdominal pain, discomfort at site of IR drain. He is asking when he will be discharged and is anxious to go home.     PAST MEDICAL & SURGICAL HISTORY  Diabetic neuropathy  STEMI (ST elevation myocardial infarction)  Diverticulitis  MRSA bacteremia  History of celiac disease  CHF (congestive heart failure): EF ~ 25%  HTN (hypertension)  Diabetes: on insulin pump  Blood clot due to device, implant, or graft: was on blood thinners  HLD (hyperlipidemia)  Osteoarthritis  Atherosclerosis of coronary artery: CAD (coronary artery disease)  Status post percutaneous transluminal coronary angioplasty: in 2012  History of open reduction and internal fixation (ORIF) procedure  Surgery, elective: Right shoulder  Surgery, elective: right knee wound debridement  S/P TKR (total knee replacement), right: with infection Mrsa   per pt he was cleared from MRSA infection  S/P CABG x 1: 2018  Stented coronary artery: 10/18 heart attack  INFERIOR WALL MI  Other postprocedural status: Fixation hardware in foot LEFT    SOCIAL HISTORY:  Negative for smoking/alcohol/drug use.     ALLERGIES:  ACE inhibitors (Hives)  enalapril (Hives)    MEDICATIONS:  STANDING MEDICATIONS  acetaminophen   Tablet .. 650 milliGRAM(s) Oral every 6 hours PRN Temp greater or equal to 38C (100.4F), Moderate Pain (4 - 6)  aluminum hydroxide/magnesium hydroxide/simethicone Suspension 30 milliLiter(s) Oral every 6 hours PRN Dyspepsia  aspirin enteric coated 81 milliGRAM(s) Oral daily  atorvastatin Oral Tab/Cap - Peds 40 milliGRAM(s) Oral daily  BACItracin   Ointment 1 Application(s) Topical two times a day  chlorhexidine 4% Liquid 1 Application(s) Topical <User Schedule>  collagenase Ointment 1 Application(s) Topical daily  dextrose 10%. 250 milliLiter(s) (1000 mL/Hr) IV Continuous <Continuous>  dextrose 40% Gel 15 Gram(s) Oral once PRN Blood Glucose LESS THAN 70 milliGRAM(s)/deciliter  dextrose 5%. 1000 milliLiter(s) (50 mL/Hr) IV Continuous <Continuous>  dextrose 50% Injectable 12.5 Gram(s) IV Push once  dextrose 50% Injectable 25 Gram(s) IV Push once  dextrose 50% Injectable 25 Gram(s) IV Push once  digoxin     Tablet 0.125 milliGRAM(s) Oral daily  DULoxetine 90 milliGRAM(s) Oral daily  glucagon  Injectable 1 milliGRAM(s) IntraMuscular once PRN Glucose LESS THAN 70 milligrams/deciliter  heparin  Injectable 5000 Unit(s) SubCutaneous every 8 hours  insulin glargine Injectable (LANTUS) 20 Unit(s) SubCutaneous at bedtime  insulin lispro (HumaLOG) corrective regimen sliding scale   SubCutaneous three times a day before meals  insulin lispro Injectable (HumaLOG) 6 Unit(s) SubCutaneous three times a day before meals  insulin regular  human recombinant. 10 Unit(s) IV Push once  metoprolol succinate ER 50 milliGRAM(s) Oral daily  midodrine. 10 milliGRAM(s) Oral three times a day  nystatin Powder 1 Application(s) Topical every 8 hours  ondansetron Injectable 4 milliGRAM(s) IV Push once PRN Nausea and/or Vomiting  pantoprazole    Tablet 40 milliGRAM(s) Oral at bedtime  prasugrel 10 milliGRAM(s) Oral daily  silver sulfADIAZINE 1% Cream 1 Application(s) Topical two times a day  tamsulosin Oral Tab/Cap - Peds 0.4 milliGRAM(s) Oral at bedtime    PRN MEDICATIONS  acetaminophen   Tablet .. 650 milliGRAM(s) Oral every 6 hours PRN  aluminum hydroxide/magnesium hydroxide/simethicone Suspension 30 milliLiter(s) Oral every 6 hours PRN  dextrose 40% Gel 15 Gram(s) Oral once PRN  glucagon  Injectable 1 milliGRAM(s) IntraMuscular once PRN  ondansetron Injectable 4 milliGRAM(s) IV Push once PRN    VITALS:   T(F): 97.3  HR: 88 (60 - 88)  BP: 131/77 (87/52 - 131/77)  RR: 18 (18 - 18)  SpO2: 95% (95% - 97%)    LABS:                        11.4   7.09  )-----------( 293      ( 16 Mar 2020 05:40 )             38.9     03-16    139  |  102  |  21<H>  ----------------------------<  152<H>  5.8<H>   |  30  |  0.8    Ca    8.9      16 Mar 2020 05:40  Mg     1.8     03-16    TPro  6.2  /  Alb  3.2<L>  /  TBili  1.0  /  DBili  x   /  AST  29  /  ALT  38  /  AlkPhos  199<H>  03-16    PT/INR - ( 16 Mar 2020 05:40 )   PT: 12.90 sec;   INR: 1.12 ratio       PTT - ( 16 Mar 2020 05:40 )  PTT:29.4 sec    PHYSICAL EXAM:  GEN: In no acute distress. Pt. is awake in bed able to have a conversation.  LUNGS: CTABL, Symmetrical inspiration, no increased work of breathing  HEART: +S1,S2, No murmurs, Rubs, Gallops appreciated  ABD: Bowel Sounds Present, Soft, non tender, non distended, no guarding, no rebound. cholecystomy drain in place draining bilious fluid, dressing/skin nontender to palpation, nonerythematous/edematious  EXT: RLE in ace bandage; L ankle wrapped in kerlix - with medial ulcer appreciated    NEURO: AAOX3. No focal deficits.

## 2020-03-16 NOTE — CONSULT NOTE ADULT - SUBJECTIVE AND OBJECTIVE BOX
Podiatry Consult Note    Subjective:   FLOWER MARISCAL is a pleasant well-nourished, well developed 64y Male in no acute distress, alert awake, and oriented to person, place and time.   Patient is a 64y old  Male who presents with a chief complaint of Altered mental status (15 Mar 2020 17:59)    HPI:  [64 year old gentleman]    CC: Altered mental status     PM/SH: HTN, DLD, CAD s/p CABG, HFrEF (25-30% on ECHO 2/2020, 20-25% on MUGA scan in 12/2019) s/p [facility] BiV placement 2/2020, DM (on insulin pump), pulmonary hypertension, Celiac disease, diverticulitis, s/p right total knee replacement October 2019 complicated by infection s/p abx course w/ PICC and  right thigh with skin graft, right hip fracture s/p ORIF 2/2020     History of Present Illness goes back to the day of admission. The patient as send here from NH where he was for rehab after his recent ORIF. Per the records, patient was agitated and combative with staff. Patient was claiming "there was a person sneaking around by his room and he wanted the staff from NH to call the Police." Patient also stated he was scared so he was screaming.  Per the patient he does not know exactly what happened. He said he realized he was acting weird but was not aware of why. He said he got into confrontations with the nursing staff, the police and the EMS because they were being aggressive towards him.   Patient otherwise denies any hallucination/ HA/dizziness/chest pain/sob/abd pain/n/v/d.    In the ED, vitals were stable. Labs showed elevated WBC (it has been before). Na was 130 (became low after last admission), K was 5.2, creatinine was 1.9 from 0.8. Lactate was 2.4. UA was grossly positive. Per the ED team and signout and patient fields was changed and sample was from urine. Per their notes, the patient refused fields removal. (21 Feb 2020 04:11)    Past Medical History and Surgical History  PAST MEDICAL & SURGICAL HISTORY:  Diabetic neuropathy  STEMI (ST elevation myocardial infarction)  Diverticulitis  MRSA bacteremia  History of celiac disease  CHF (congestive heart failure): EF ~ 25%  HTN (hypertension)  Diabetes: on insulin pump  Blood clot due to device, implant, or graft: was on blood thinners  HLD (hyperlipidemia)  Osteoarthritis  Atherosclerosis of coronary artery: CAD (coronary artery disease)  Status post percutaneous transluminal coronary angioplasty: in 2012  History of open reduction and internal fixation (ORIF) procedure  Surgery, elective: Right shoulder  Surgery, elective: right knee wound debridement  S/P TKR (total knee replacement), right: with infection Mrsa   per pt he was cleared from MRSA infection  S/P CABG x 1: 2018  Stented coronary artery: 10/18 heart attack  INFERIOR WALL MI  Other postprocedural status: Fixation hardware in foot LEFT     Objective:  Vital Signs Last 24 Hrs  T(C): 36.3 (16 Mar 2020 07:08), Max: 36.3 (15 Mar 2020 14:30)  T(F): 97.3 (16 Mar 2020 07:08), Max: 97.4 (15 Mar 2020 14:30)  HR: 88 (16 Mar 2020 07:08) (60 - 88)  BP: 131/77 (16 Mar 2020 07:08) (87/52 - 131/77)  BP(mean): --  RR: 18 (16 Mar 2020 07:08) (18 - 18)  SpO2: 95% (16 Mar 2020 07:56) (95% - 97%)                        11.4   7.09  )-----------( 293      ( 16 Mar 2020 05:40 )             38.9                 03-16    139  |  102  |  21<H>  ----------------------------<  152<H>  5.8<H>   |  30  |  0.8    Ca    8.9      16 Mar 2020 05:40  Mg     1.8     03-16    TPro  6.2  /  Alb  3.2<L>  /  TBili  1.0  /  DBili  x   /  AST  29  /  ALT  38  /  AlkPhos  199<H>  03-16    Physical Exam - Lower Extremity Focused:   Derm:   Left Medial Malleolus Pressure Ulcer; w/ Fibrogranular Covering  Left 2nd Digit Pressure Ulcer; Plantar Aspect; Probes to Bone; w/ No Purulence Noted or Drainage    Vascular: PT Pulses Palpable; Foot is warm to warm to the touch   Neuro: Protective Sensation Diminished / Moderately Neuropathic   MSK; Left 2nd Digit; Partial Amputation     Assessment:   Left Medial Malleolus; Pressure Ulcer; Stable;   Left 2nd Digit Pressure Ulcer; Probes to Bone;     Plan:  Chart reviewed and Patient evaluated. All Questions and Concerns Addressed and Answered  Discussed diagnosis and treatment with patient  Local Wound Care; Santyl to Medial Malleolus / Xeroform to 2nd Digit; Plantar Aspect;   Recommend; XR Left Foot; Left 2nd Digit Pressure Ulcer; Probes To Bone; Rule Out Osteomyelitis   Continue w/ Local Wound Care; Q24 Dressing Changes;   Will Follow Up Post XR Imaging  Will Discuss Plan w/ Dr. Hooper    Podiatry

## 2020-03-16 NOTE — PROGRESS NOTE ADULT - ATTENDING COMMENTS
I saw and examined the patient independently. I agree with above history, physical exam and plan of care which I have reviewed and edited where appropriate with following additions.    patient resting comfortably in bed/ he wants to walk with PT and go home not STR.     HFrEF with malfunctioning AICD lead:   telemonitoring.   euvolemic.   CT surgery followed but no surgical intervention done.   EP re-programmed the device   c/w current meds.     sepsis due to acute cholecystitis:   sepsis resolved.   completed ABX.   sp percutaneous drainage/drain with minimal Output but IR suggests to continue drain for at least 6 weeks and fu surgery as OP.     Left foot pressure ulcer/rule out OM:    podiatry eval appreciated- suggest X ray L foot and r/o OM as ulcer 2nd toe probes to bone.   c/w local wound care.   ID eval for need for ABx.       #CAD s/p CABG and PCI  - Continue with ASA, Atorvastatin, and Metoprolol  - Prasugrel resumed as CT surgery plans no surgical intervention for ICD.    #Type 2 DM  - Continue to monitor finger sticks      #HTN :   BP better after increasing midodrine dose.   c.w midodrine 10mg tid.  c/w toprol             PT eval     dvt ppx.     Progress note Handoff:   Pending:  x ray L foot / ID eval / podiatry fu after foot x ray.   Discussion: plan of care with  patient /  satisfied- all questions answered.  Disposition: unknown at this time.

## 2020-03-17 LAB
ALBUMIN SERPL ELPH-MCNC: 3.1 G/DL — LOW (ref 3.5–5.2)
ALP SERPL-CCNC: 177 U/L — HIGH (ref 30–115)
ALT FLD-CCNC: 36 U/L — SIGNIFICANT CHANGE UP (ref 0–41)
ANION GAP SERPL CALC-SCNC: 8 MMOL/L — SIGNIFICANT CHANGE UP (ref 7–14)
AST SERPL-CCNC: 30 U/L — SIGNIFICANT CHANGE UP (ref 0–41)
BASOPHILS # BLD AUTO: 0.06 K/UL — SIGNIFICANT CHANGE UP (ref 0–0.2)
BASOPHILS NFR BLD AUTO: 1 % — SIGNIFICANT CHANGE UP (ref 0–1)
BILIRUB SERPL-MCNC: 1.5 MG/DL — HIGH (ref 0.2–1.2)
BUN SERPL-MCNC: 18 MG/DL — SIGNIFICANT CHANGE UP (ref 10–20)
CALCIUM SERPL-MCNC: 8.6 MG/DL — SIGNIFICANT CHANGE UP (ref 8.5–10.1)
CHLORIDE SERPL-SCNC: 99 MMOL/L — SIGNIFICANT CHANGE UP (ref 98–110)
CO2 SERPL-SCNC: 29 MMOL/L — SIGNIFICANT CHANGE UP (ref 17–32)
CREAT SERPL-MCNC: 0.8 MG/DL — SIGNIFICANT CHANGE UP (ref 0.7–1.5)
EOSINOPHIL # BLD AUTO: 0.21 K/UL — SIGNIFICANT CHANGE UP (ref 0–0.7)
EOSINOPHIL NFR BLD AUTO: 3.4 % — SIGNIFICANT CHANGE UP (ref 0–8)
GLUCOSE BLDC GLUCOMTR-MCNC: 190 MG/DL — HIGH (ref 70–99)
GLUCOSE BLDC GLUCOMTR-MCNC: 202 MG/DL — HIGH (ref 70–99)
GLUCOSE BLDC GLUCOMTR-MCNC: 229 MG/DL — HIGH (ref 70–99)
GLUCOSE BLDC GLUCOMTR-MCNC: 232 MG/DL — HIGH (ref 70–99)
GLUCOSE SERPL-MCNC: 260 MG/DL — HIGH (ref 70–99)
HCT VFR BLD CALC: 37 % — LOW (ref 42–52)
HGB BLD-MCNC: 11 G/DL — LOW (ref 14–18)
IMM GRANULOCYTES NFR BLD AUTO: 0.2 % — SIGNIFICANT CHANGE UP (ref 0.1–0.3)
LYMPHOCYTES # BLD AUTO: 0.81 K/UL — LOW (ref 1.2–3.4)
LYMPHOCYTES # BLD AUTO: 13 % — LOW (ref 20.5–51.1)
MAGNESIUM SERPL-MCNC: 1.8 MG/DL — SIGNIFICANT CHANGE UP (ref 1.8–2.4)
MCHC RBC-ENTMCNC: 23 PG — LOW (ref 27–31)
MCHC RBC-ENTMCNC: 29.7 G/DL — LOW (ref 32–37)
MCV RBC AUTO: 77.4 FL — LOW (ref 80–94)
MONOCYTES # BLD AUTO: 0.57 K/UL — SIGNIFICANT CHANGE UP (ref 0.1–0.6)
MONOCYTES NFR BLD AUTO: 9.1 % — SIGNIFICANT CHANGE UP (ref 1.7–9.3)
NEUTROPHILS # BLD AUTO: 4.58 K/UL — SIGNIFICANT CHANGE UP (ref 1.4–6.5)
NEUTROPHILS NFR BLD AUTO: 73.3 % — SIGNIFICANT CHANGE UP (ref 42.2–75.2)
NRBC # BLD: 0 /100 WBCS — SIGNIFICANT CHANGE UP (ref 0–0)
PLATELET # BLD AUTO: 270 K/UL — SIGNIFICANT CHANGE UP (ref 130–400)
POTASSIUM SERPL-MCNC: 4.6 MMOL/L — SIGNIFICANT CHANGE UP (ref 3.5–5)
POTASSIUM SERPL-SCNC: 4.6 MMOL/L — SIGNIFICANT CHANGE UP (ref 3.5–5)
PROT SERPL-MCNC: 6.2 G/DL — SIGNIFICANT CHANGE UP (ref 6–8)
RBC # BLD: 4.78 M/UL — SIGNIFICANT CHANGE UP (ref 4.7–6.1)
RBC # FLD: 27.3 % — HIGH (ref 11.5–14.5)
SODIUM SERPL-SCNC: 136 MMOL/L — SIGNIFICANT CHANGE UP (ref 135–146)
WBC # BLD: 6.24 K/UL — SIGNIFICANT CHANGE UP (ref 4.8–10.8)
WBC # FLD AUTO: 6.24 K/UL — SIGNIFICANT CHANGE UP (ref 4.8–10.8)

## 2020-03-17 PROCEDURE — 99233 SBSQ HOSP IP/OBS HIGH 50: CPT

## 2020-03-17 RX ADMIN — Medication 6 UNIT(S): at 08:36

## 2020-03-17 RX ADMIN — Medication 50 MILLIGRAM(S): at 06:06

## 2020-03-17 RX ADMIN — MIDODRINE HYDROCHLORIDE 10 MILLIGRAM(S): 2.5 TABLET ORAL at 16:56

## 2020-03-17 RX ADMIN — Medication 4: at 12:11

## 2020-03-17 RX ADMIN — INSULIN GLARGINE 20 UNIT(S): 100 INJECTION, SOLUTION SUBCUTANEOUS at 21:31

## 2020-03-17 RX ADMIN — MIDODRINE HYDROCHLORIDE 10 MILLIGRAM(S): 2.5 TABLET ORAL at 06:04

## 2020-03-17 RX ADMIN — DULOXETINE HYDROCHLORIDE 90 MILLIGRAM(S): 30 CAPSULE, DELAYED RELEASE ORAL at 12:12

## 2020-03-17 RX ADMIN — Medication 1 APPLICATION(S): at 14:11

## 2020-03-17 RX ADMIN — NYSTATIN CREAM 1 APPLICATION(S): 100000 CREAM TOPICAL at 21:31

## 2020-03-17 RX ADMIN — Medication 6 UNIT(S): at 16:55

## 2020-03-17 RX ADMIN — Medication 2: at 16:56

## 2020-03-17 RX ADMIN — HEPARIN SODIUM 5000 UNIT(S): 5000 INJECTION INTRAVENOUS; SUBCUTANEOUS at 21:32

## 2020-03-17 RX ADMIN — Medication 1 APPLICATION(S): at 06:04

## 2020-03-17 RX ADMIN — Medication 81 MILLIGRAM(S): at 12:12

## 2020-03-17 RX ADMIN — Medication 6 UNIT(S): at 12:10

## 2020-03-17 RX ADMIN — PANTOPRAZOLE SODIUM 40 MILLIGRAM(S): 20 TABLET, DELAYED RELEASE ORAL at 21:32

## 2020-03-17 RX ADMIN — TAMSULOSIN HYDROCHLORIDE 0.4 MILLIGRAM(S): 0.4 CAPSULE ORAL at 21:32

## 2020-03-17 RX ADMIN — NYSTATIN CREAM 1 APPLICATION(S): 100000 CREAM TOPICAL at 14:11

## 2020-03-17 RX ADMIN — Medication 1 APPLICATION(S): at 06:07

## 2020-03-17 RX ADMIN — HEPARIN SODIUM 5000 UNIT(S): 5000 INJECTION INTRAVENOUS; SUBCUTANEOUS at 06:06

## 2020-03-17 RX ADMIN — PRASUGREL 10 MILLIGRAM(S): 5 TABLET, FILM COATED ORAL at 12:12

## 2020-03-17 RX ADMIN — NYSTATIN CREAM 1 APPLICATION(S): 100000 CREAM TOPICAL at 06:04

## 2020-03-17 RX ADMIN — Medication 1 APPLICATION(S): at 16:57

## 2020-03-17 RX ADMIN — Medication 0.12 MILLIGRAM(S): at 06:03

## 2020-03-17 RX ADMIN — CHLORHEXIDINE GLUCONATE 1 APPLICATION(S): 213 SOLUTION TOPICAL at 06:03

## 2020-03-17 RX ADMIN — MIDODRINE HYDROCHLORIDE 10 MILLIGRAM(S): 2.5 TABLET ORAL at 12:12

## 2020-03-17 RX ADMIN — ATORVASTATIN CALCIUM 40 MILLIGRAM(S): 80 TABLET, FILM COATED ORAL at 21:32

## 2020-03-17 RX ADMIN — HEPARIN SODIUM 5000 UNIT(S): 5000 INJECTION INTRAVENOUS; SUBCUTANEOUS at 14:11

## 2020-03-17 NOTE — CONSULT NOTE ADULT - REASON FOR ADMISSION
Altered mental status

## 2020-03-17 NOTE — CONSULT NOTE ADULT - CONSULT REASON
Patient with chronic Loja with positive UA and metabolic encephalopathy. Unclear if UA from chronic Loja or not.
AMS
Abnormal Liver Function Studies  Gallbladder Stones  Cholecystitis
LV lead revision
Left Medial Ankle Pressure Ulcer;
OM
Percutaneous cholecystomy
R hip fx altered mental status
acute cholecystitis
percutaneous cholecystostomy
pre-op clearance
rule out biliary disease

## 2020-03-17 NOTE — CONSULT NOTE ADULT - SUBJECTIVE AND OBJECTIVE BOX
LOIDA, JOHN  64y, Male  Allergy: ACE inhibitors (Hives)  enalapril (Hives)      All historical available data reviewed.    HPI:  [64 year old gentleman]    CC: Altered mental status     PM/SH: HTN, DLD, CAD s/p CABG, HFrEF (25-30% on ECHO 2/2020, 20-25% on MUGA scan in 12/2019) s/p st carlyle BiV placement 2/2020, DM (on insulin pump), pulmonary hypertension, Celiac disease, diverticulitis, s/p right total knee replacement October 2019 complicated by infection s/p abx course w/ PICC and  right thigh with skin graft, right hip fracture s/p ORIF 2/2020     History of Present Illness goes back to the day of admission. The patient as send here from NH where he was for rehab after his recent ORIF. Per the records, patient was agitated and combative with staff. Patient was claiming "there was a person sneaking around by his room and he wanted the staff from NH to call the Police." Patient also stated he was scared so he was screaming.  Per the patient he does not know exactly what happened. He said he realized he was acting weird but was not aware of why. He said he got into confrontations with the nursing staff, the police and the EMS because they were being aggressive towards him.   Patient otherwise denies any hallucination/ HA/dizziness/chest pain/sob/abd pain/n/v/d.    In the ED, vitals were stable. Labs showed elevated WBC (it has been before). Na was 130 (became low after last admission), K was 5.2, creatinine was 1.9 from 0.8. Lactate was 2.4. UA was grossly positive. Per the ED team and signout and patient fields was changed and sample was from urine. Per their notes, the patient refused fields removal. (21 Feb 2020 04:11)    FAMILY HISTORY:  Family history of allergies: daughter  Family history of heart disease (Mother)    PAST MEDICAL & SURGICAL HISTORY:  Diabetic neuropathy  STEMI (ST elevation myocardial infarction)  Diverticulitis  MRSA bacteremia  History of celiac disease  CHF (congestive heart failure): EF ~ 25%  HTN (hypertension)  Diabetes: on insulin pump  Blood clot due to device, implant, or graft: was on blood thinners  HLD (hyperlipidemia)  Osteoarthritis  Atherosclerosis of coronary artery: CAD (coronary artery disease)  Status post percutaneous transluminal coronary angioplasty: in 2012  History of open reduction and internal fixation (ORIF) procedure  Surgery, elective: Right shoulder  Surgery, elective: right knee wound debridement  S/P TKR (total knee replacement), right: with infection Mrsa   per pt he was cleared from MRSA infection  S/P CABG x 1: 2018  Stented coronary artery: 10/18 heart attack  INFERIOR WALL MI  Other postprocedural status: Fixation hardware in foot LEFT        VITALS:  T(F): 97.5, Max: 98.1 (03-16-20 @ 20:27)  HR: 86  BP: 133/64  RR: 18Vital Signs Last 24 Hrs  T(C): 36.4 (17 Mar 2020 05:48), Max: 36.7 (16 Mar 2020 20:27)  T(F): 97.5 (17 Mar 2020 05:48), Max: 98.1 (16 Mar 2020 20:27)  HR: 86 (17 Mar 2020 05:48) (77 - 86)  BP: 133/64 (17 Mar 2020 05:48) (113/63 - 133/64)  BP(mean): --  RR: 18 (17 Mar 2020 05:48) (18 - 20)  SpO2: 96% (16 Mar 2020 19:49) (86% - 96%)    TESTS & MEASUREMENTS:                        11.0   6.24  )-----------( 270      ( 17 Mar 2020 06:08 )             37.0     03-17    136  |  99  |  18  ----------------------------<  260<H>  4.6   |  29  |  0.8    Ca    8.6      17 Mar 2020 06:08  Mg     1.8     03-17    TPro  6.2  /  Alb  3.1<L>  /  TBili  1.5<H>  /  DBili  x   /  AST  30  /  ALT  36  /  AlkPhos  177<H>  03-17    LIVER FUNCTIONS - ( 17 Mar 2020 06:08 )  Alb: 3.1 g/dL / Pro: 6.2 g/dL / ALK PHOS: 177 U/L / ALT: 36 U/L / AST: 30 U/L / GGT: x                   RADIOLOGY & ADDITIONAL TESTS:  Personal review of radiological diagnostics performed  Echo and EKG results noted when applicable.     MEDICATIONS:  acetaminophen   Tablet .. 650 milliGRAM(s) Oral every 6 hours PRN  aluminum hydroxide/magnesium hydroxide/simethicone Suspension 30 milliLiter(s) Oral every 6 hours PRN  aspirin enteric coated 81 milliGRAM(s) Oral daily  atorvastatin Oral Tab/Cap - Peds 40 milliGRAM(s) Oral daily  BACItracin   Ointment 1 Application(s) Topical two times a day  chlorhexidine 4% Liquid 1 Application(s) Topical <User Schedule>  collagenase Ointment 1 Application(s) Topical daily  dextrose 40% Gel 15 Gram(s) Oral once PRN  dextrose 5%. 1000 milliLiter(s) IV Continuous <Continuous>  dextrose 50% Injectable 12.5 Gram(s) IV Push once  dextrose 50% Injectable 25 Gram(s) IV Push once  dextrose 50% Injectable 25 Gram(s) IV Push once  digoxin     Tablet 0.125 milliGRAM(s) Oral daily  DULoxetine 90 milliGRAM(s) Oral daily  glucagon  Injectable 1 milliGRAM(s) IntraMuscular once PRN  heparin  Injectable 5000 Unit(s) SubCutaneous every 8 hours  insulin glargine Injectable (LANTUS) 20 Unit(s) SubCutaneous at bedtime  insulin lispro (HumaLOG) corrective regimen sliding scale   SubCutaneous three times a day before meals  insulin lispro Injectable (HumaLOG) 6 Unit(s) SubCutaneous three times a day before meals  metoprolol succinate ER 50 milliGRAM(s) Oral daily  midodrine. 10 milliGRAM(s) Oral three times a day  nystatin Powder 1 Application(s) Topical every 8 hours  ondansetron Injectable 4 milliGRAM(s) IV Push once PRN  pantoprazole    Tablet 40 milliGRAM(s) Oral at bedtime  prasugrel 10 milliGRAM(s) Oral daily  silver sulfADIAZINE 1% Cream 1 Application(s) Topical two times a day  tamsulosin Oral Tab/Cap - Peds 0.4 milliGRAM(s) Oral at bedtime      ANTIBIOTICS:

## 2020-03-17 NOTE — CONSULT NOTE ADULT - CONSULT REQUESTED DATE/TIME
12-Mar-2020 17:29
16-Mar-2020 08:20
17-Mar-2020 12:38
22-Feb-2020 18:12
24-Feb-2020 14:45
27-Feb-2020 12:49
27-Feb-2020 13:18
27-Feb-2020 17:17
27-Feb-2020 18:11
28-Feb-2020 09:49
28-Feb-2020 15:27
21-Feb-2020 10:09

## 2020-03-17 NOTE — CONSULT NOTE ADULT - ASSESSMENT
64 year old male with PMHx of HTN, DLD, CAD s/p CABG, HFrEF (ef 25-30% on 2/2020) s/p [facility] BiV placement in 2/2020, Diabetes on insulin pummp, Pulmonary HTN, Celiacs, Diverticulitis, Right Hip Fracture s/p ORIF who presented with metabolic encephalopathy.     IMPRESSION:  # Cholecystitis : s/p PTC . Clinically no ongoing peritonitis. No peritonitis. no cholestasis  -WBC 7.0  #Ischemic ulcer medial malleolus left foot with possibly chronic underling OM. Ischemic ulcer distal phalnx 2nd digit left foot.  No abscess/ cellulitis    RECOMMENDATIONS:  -see no need for ABx  -recall prn please

## 2020-03-17 NOTE — PROGRESS NOTE ADULT - SUBJECTIVE AND OBJECTIVE BOX
SUBJECTIVE:    Patient is a 64y old Male who presents with a chief complaint of Altered mental status (17 Mar 2020 14:01)    Currently admitted to medicine with the primary diagnosis of Metabolic encephalopathy     Today is hospital day 25d. This morning he is resting comfortably in bed and reports no new issues or overnight events. He denies CP, palpitations, SOB, abdominal pain. He states he is feeling well.     PAST MEDICAL & SURGICAL HISTORY  Diabetic neuropathy  STEMI (ST elevation myocardial infarction)  Diverticulitis  MRSA bacteremia  History of celiac disease  CHF (congestive heart failure): EF ~ 25%  HTN (hypertension)  Diabetes: on insulin pump  Blood clot due to device, implant, or graft: was on blood thinners  HLD (hyperlipidemia)  Osteoarthritis  Atherosclerosis of coronary artery: CAD (coronary artery disease)  Status post percutaneous transluminal coronary angioplasty: in 2012  History of open reduction and internal fixation (ORIF) procedure  Surgery, elective: Right shoulder  Surgery, elective: right knee wound debridement  S/P TKR (total knee replacement), right: with infection Mrsa   per pt he was cleared from MRSA infection  S/P CABG x 1: 2018  Stented coronary artery: 10/18 heart attack  INFERIOR WALL MI  Other postprocedural status: Fixation hardware in foot LEFT    SOCIAL HISTORY:  Negative for smoking/alcohol/drug use.     ALLERGIES:  ACE inhibitors (Hives)  enalapril (Hives)    MEDICATIONS:  STANDING MEDICATIONS  acetaminophen   Tablet .. 650 milliGRAM(s) Oral every 6 hours PRN Temp greater or equal to 38C (100.4F), Moderate Pain (4 - 6)  aluminum hydroxide/magnesium hydroxide/simethicone Suspension 30 milliLiter(s) Oral every 6 hours PRN Dyspepsia  aspirin enteric coated 81 milliGRAM(s) Oral daily  atorvastatin Oral Tab/Cap - Peds 40 milliGRAM(s) Oral daily  BACItracin   Ointment 1 Application(s) Topical two times a day  chlorhexidine 4% Liquid 1 Application(s) Topical <User Schedule>  collagenase Ointment 1 Application(s) Topical daily  dextrose 40% Gel 15 Gram(s) Oral once PRN Blood Glucose LESS THAN 70 milliGRAM(s)/deciliter  dextrose 5%. 1000 milliLiter(s) (50 mL/Hr) IV Continuous <Continuous>  dextrose 50% Injectable 12.5 Gram(s) IV Push once  dextrose 50% Injectable 25 Gram(s) IV Push once  dextrose 50% Injectable 25 Gram(s) IV Push once  digoxin     Tablet 0.125 milliGRAM(s) Oral daily  DULoxetine 90 milliGRAM(s) Oral daily  glucagon  Injectable 1 milliGRAM(s) IntraMuscular once PRN Glucose LESS THAN 70 milligrams/deciliter  heparin  Injectable 5000 Unit(s) SubCutaneous every 8 hours  insulin glargine Injectable (LANTUS) 20 Unit(s) SubCutaneous at bedtime  insulin lispro (HumaLOG) corrective regimen sliding scale   SubCutaneous three times a day before meals  insulin lispro Injectable (HumaLOG) 6 Unit(s) SubCutaneous three times a day before meals  metoprolol succinate ER 50 milliGRAM(s) Oral daily  midodrine. 10 milliGRAM(s) Oral three times a day  nystatin Powder 1 Application(s) Topical every 8 hours  ondansetron Injectable 4 milliGRAM(s) IV Push once PRN Nausea and/or Vomiting  pantoprazole    Tablet 40 milliGRAM(s) Oral at bedtime  prasugrel 10 milliGRAM(s) Oral daily  silver sulfADIAZINE 1% Cream 1 Application(s) Topical two times a day  tamsulosin Oral Tab/Cap - Peds 0.4 milliGRAM(s) Oral at bedtime    PRN MEDICATIONS  acetaminophen   Tablet .. 650 milliGRAM(s) Oral every 6 hours PRN  aluminum hydroxide/magnesium hydroxide/simethicone Suspension 30 milliLiter(s) Oral every 6 hours PRN  dextrose 40% Gel 15 Gram(s) Oral once PRN  glucagon  Injectable 1 milliGRAM(s) IntraMuscular once PRN  ondansetron Injectable 4 milliGRAM(s) IV Push once PRN    VITALS:   T(F): 96.3  HR: 71 (71 - 86)  BP: 103/53 (103/53 - 133/64)  RR: 18 (18 - 18)  SpO2: 96% (96% - 96%)    LABS:                        11.0   6.24  )-----------( 270      ( 17 Mar 2020 06:08 )             37.0     03-17    136  |  99  |  18  ----------------------------<  260<H>  4.6   |  29  |  0.8    Ca    8.6      17 Mar 2020 06:08  Mg     1.8     03-17    TPro  6.2  /  Alb  3.1<L>  /  TBili  1.5<H>  /  DBili  x   /  AST  30  /  ALT  36  /  AlkPhos  177<H>  03-17    PT/INR - ( 16 Mar 2020 05:40 )   PT: 12.90 sec;   INR: 1.12 ratio      PTT - ( 16 Mar 2020 05:40 )  PTT:29.4 sec    PHYSICAL EXAM:  GEN: In no acute distress. Pt. is awake in bed able to have a conversation.  LUNGS: CTABL, Symmetrical inspiration, no increased work of breathing  HEART: +S1,S2, No murmurs, Rubs, Gallops   ABD: Bowel Sounds Present, Soft, non tender, non distended, no guarding, no rebound. perc cholecystostomy tube in place w biliary tinge- no tenderness to palpation, no edema/drainage appreciated  EXT: RLE in ace wrap; L foot in kerlix- dressing CDI  NEURO: AAOX3. No focal deficits.

## 2020-03-17 NOTE — CONSULT NOTE ADULT - PROVIDER SPECIALTY LIST ADULT
CT Surgery
Infectious Disease
Intervent Radiology
Intervent Radiology
Podiatry
Cardiology
Gastroenterology
Neurology
Physiatry
Surgery
Infectious Disease
Infectious Disease

## 2020-03-17 NOTE — CONSULT NOTE ADULT - EXTREMITIES COMMENTS
L foot medial malleolus with superficial ulcer with no pus/erythema. Superficial ulcer along distal phalanx 2nd digit  No pulses

## 2020-03-17 NOTE — PROGRESS NOTE ADULT - SUBJECTIVE AND OBJECTIVE BOX
Considering Admission to 4a  however Bili increasing To 1.5  Also Now roommate ? patient RO for COVID 19  Will follow

## 2020-03-17 NOTE — PROGRESS NOTE ADULT - ATTENDING COMMENTS
Patient seen and examined independently. I agree with the resident's note, physical exam, and plan except as below.  Vital Signs Last 24 Hrs  T(C): 35.7 (17 Mar 2020 13:53), Max: 36.7 (16 Mar 2020 20:27)  T(F): 96.3 (17 Mar 2020 13:53), Max: 98.1 (16 Mar 2020 20:27)  HR: 71 (17 Mar 2020 13:53) (71 - 86)  BP: 103/53 (17 Mar 2020 13:53) (103/53 - 133/64)  BP(mean): --  RR: 18 (17 Mar 2020 13:53) (18 - 18)  SpO2: 96% (17 Mar 2020 14:35) (96% - 96%)  PE  nad  aaox3  [id number]  ctabl  softntn+bs  +perc  no cce LEft foot dressed nontender    #ac sudhir sp PERc - keep for 6 weeks then outpt surgical intervention   #acute on chronic systoilic chf- no acute decompensation at this time  #AICD reprogrammed by EPS  #left foot ulcer - PTB/om on XR - chronic - seen by ID - no need for abxs  #CAD - CABG - cont me tx  #droplet precautions - placed by ID as roomate is being ruled out forCOVID  #debility - dispo to 4A when droplet cleared

## 2020-03-17 NOTE — PROGRESS NOTE ADULT - ASSESSMENT
This is a 64 year old male with PMHx of HTN, DLD, CAD s/p CABG, HFrEF (ef 25-30% on 2/2020) s/p [facility] BiV placement in 2/2020, Diabetes on insulin pummp, Pulmonary HTN, Celiacs, Diverticulitis, Right Hip Fracture s/p ORIF who presented with metabolic encephalopathy.     #CHFrEF (EF 25-30%),   - Noted recent BiV AICD malfunctioning.   - EPS aware - reprogrammed device 3/16 - no need for CTS -can FU outpatient  - Continue on Digoxin 0.125 and metoprolol 50mg daily  - digoxin level WNL    #sepsis (resolved) secondary to acute cholecystitis   - Noted hydropic gallbladder with stones and sludge.   - S/p perc choley 2/28 with approximately 75cc output  - completed levaquin course  - IR contacted for follow up 3/16: drain will be in at least 6 weeks, after that, will FU with [facility] surgery [phone] for either sx vs tube exchange  - bili increased to 1.5 today - will monitor tomorrow AM -     #hyperkalemia -resolved   - dig level WNL  - continue on renal diet  - monitor daily    # L foot 2nd digit pressure ulcer  - XR left foot - suggestive of OM  - ulcer probes to bone  - podiatry on board  - continue local wound care  - per ID - no abx needed     #CAD s/p CABG and PCI  - Continue with ASA, Atorvastatin, and Metoprolol  - Prasugrel continue    #Celiacs  - Continue on gluten restricted diet    #Type 2 DM  - Continue to monitor finger sticks  - Continue on Insulin regimen: Lantus 20, Humalog 6/6/6    #HTN (stable)    #Anxiety  - Continue on Cymbalta     #Skin graft  - Wound care as per burn team    Activity; As tolerated  Diet: Carb Consistent DASH diet  DVT ppx: heparin Sub Q  GI ppx: Protonix  Code Status: Full Code  DISPO: From [place], likely go to 4a - pending bili resolution This is a 64 year old male with PMHx of HTN, DLD, CAD s/p CABG, HFrEF (ef 25-30% on 2/2020) s/p [facility] BiV placement in 2/2020, Diabetes on insulin pummp, Pulmonary HTN, Celiacs, Diverticulitis, Right Hip Fracture s/p ORIF who presented with metabolic encephalopathy.     #CHFrEF (EF 25-30%),   - Noted recent BiV AICD malfunctioning.   - EPS aware - reprogrammed device 3/16 - no need for CTS -can FU outpatient  - Continue on Digoxin 0.125 and metoprolol 50mg daily  - digoxin level WNL    #sepsis (resolved) secondary to acute cholecystitis   - Noted hydropic gallbladder with stones and sludge.   - S/p perc choley 2/28 with approximately 75cc output  - completed levaquin course  - IR contacted for follow up 3/16: drain will be in at least 6 weeks, after that, will FU with [facility] surgery [phone] for either sx vs tube exchange  - bili increased to 1.5 today - will monitor tomorrow AM -     #hyperkalemia -resolved   - dig level WNL  - continue on renal diet  - monitor daily    # L foot 2nd digit pressure ulcer  - XR left foot - suggestive of OM  - ulcer probes to bone  - podiatry on board  - continue local wound care  - per ID - no abx needed     #CAD s/p CABG and PCI  - Continue with ASA, Atorvastatin, and Metoprolol  - Prasugrel continue    #Celiacs  - Continue on gluten restricted diet    #Type 2 DM  - Continue to monitor finger sticks  - Continue on Insulin regimen: Lantus 20, Humalog 6/6/6    #HTN (stable)    #Anxiety  - Continue on Cymbalta     #Skin graft  - Wound care as per burn team    # roommate w/ R/O Covid patient who has now moved  - per ID, no need for testing patient as he is asymptomatic  - remain on droplet precaution until roommate r/o    Activity; As tolerated  Diet: Carb Consistent DASH diet  DVT ppx: heparin Sub Q  GI ppx: Protonix  Code Status: Full Code  DISPO: From [place], likely go to 4a - pending bili resolution

## 2020-03-18 LAB
ALBUMIN SERPL ELPH-MCNC: 3.1 G/DL — LOW (ref 3.5–5.2)
ALP SERPL-CCNC: 190 U/L — HIGH (ref 30–115)
ALT FLD-CCNC: 38 U/L — SIGNIFICANT CHANGE UP (ref 0–41)
ANION GAP SERPL CALC-SCNC: 10 MMOL/L — SIGNIFICANT CHANGE UP (ref 7–14)
AST SERPL-CCNC: 31 U/L — SIGNIFICANT CHANGE UP (ref 0–41)
BASOPHILS # BLD AUTO: 0.05 K/UL — SIGNIFICANT CHANGE UP (ref 0–0.2)
BASOPHILS NFR BLD AUTO: 0.7 % — SIGNIFICANT CHANGE UP (ref 0–1)
BILIRUB DIRECT SERPL-MCNC: 0.3 MG/DL — HIGH (ref 0–0.2)
BILIRUB INDIRECT FLD-MCNC: 0.7 MG/DL — SIGNIFICANT CHANGE UP (ref 0.2–1.2)
BILIRUB SERPL-MCNC: 1 MG/DL — SIGNIFICANT CHANGE UP (ref 0.2–1.2)
BILIRUB SERPL-MCNC: 1 MG/DL — SIGNIFICANT CHANGE UP (ref 0.2–1.2)
BUN SERPL-MCNC: 12 MG/DL — SIGNIFICANT CHANGE UP (ref 10–20)
CALCIUM SERPL-MCNC: 8.8 MG/DL — SIGNIFICANT CHANGE UP (ref 8.5–10.1)
CHLORIDE SERPL-SCNC: 101 MMOL/L — SIGNIFICANT CHANGE UP (ref 98–110)
CO2 SERPL-SCNC: 29 MMOL/L — SIGNIFICANT CHANGE UP (ref 17–32)
CREAT SERPL-MCNC: 0.7 MG/DL — SIGNIFICANT CHANGE UP (ref 0.7–1.5)
EOSINOPHIL # BLD AUTO: 0.28 K/UL — SIGNIFICANT CHANGE UP (ref 0–0.7)
EOSINOPHIL NFR BLD AUTO: 3.7 % — SIGNIFICANT CHANGE UP (ref 0–8)
GLUCOSE BLDC GLUCOMTR-MCNC: 112 MG/DL — HIGH (ref 70–99)
GLUCOSE BLDC GLUCOMTR-MCNC: 132 MG/DL — HIGH (ref 70–99)
GLUCOSE BLDC GLUCOMTR-MCNC: 133 MG/DL — HIGH (ref 70–99)
GLUCOSE BLDC GLUCOMTR-MCNC: 144 MG/DL — HIGH (ref 70–99)
GLUCOSE SERPL-MCNC: 144 MG/DL — HIGH (ref 70–99)
HCT VFR BLD CALC: 39.4 % — LOW (ref 42–52)
HGB BLD-MCNC: 11.8 G/DL — LOW (ref 14–18)
IMM GRANULOCYTES NFR BLD AUTO: 0.3 % — SIGNIFICANT CHANGE UP (ref 0.1–0.3)
LYMPHOCYTES # BLD AUTO: 0.89 K/UL — LOW (ref 1.2–3.4)
LYMPHOCYTES # BLD AUTO: 11.9 % — LOW (ref 20.5–51.1)
MAGNESIUM SERPL-MCNC: 1.9 MG/DL — SIGNIFICANT CHANGE UP (ref 1.8–2.4)
MCHC RBC-ENTMCNC: 23.2 PG — LOW (ref 27–31)
MCHC RBC-ENTMCNC: 29.9 G/DL — LOW (ref 32–37)
MCV RBC AUTO: 77.4 FL — LOW (ref 80–94)
MONOCYTES # BLD AUTO: 0.65 K/UL — HIGH (ref 0.1–0.6)
MONOCYTES NFR BLD AUTO: 8.7 % — SIGNIFICANT CHANGE UP (ref 1.7–9.3)
NEUTROPHILS # BLD AUTO: 5.61 K/UL — SIGNIFICANT CHANGE UP (ref 1.4–6.5)
NEUTROPHILS NFR BLD AUTO: 74.7 % — SIGNIFICANT CHANGE UP (ref 42.2–75.2)
NRBC # BLD: 0 /100 WBCS — SIGNIFICANT CHANGE UP (ref 0–0)
PLATELET # BLD AUTO: 265 K/UL — SIGNIFICANT CHANGE UP (ref 130–400)
POTASSIUM SERPL-MCNC: 4.8 MMOL/L — SIGNIFICANT CHANGE UP (ref 3.5–5)
POTASSIUM SERPL-SCNC: 4.8 MMOL/L — SIGNIFICANT CHANGE UP (ref 3.5–5)
PROT SERPL-MCNC: 6.2 G/DL — SIGNIFICANT CHANGE UP (ref 6–8)
RBC # BLD: 5.09 M/UL — SIGNIFICANT CHANGE UP (ref 4.7–6.1)
RBC # FLD: 27.5 % — HIGH (ref 11.5–14.5)
SODIUM SERPL-SCNC: 140 MMOL/L — SIGNIFICANT CHANGE UP (ref 135–146)
WBC # BLD: 7.5 K/UL — SIGNIFICANT CHANGE UP (ref 4.8–10.8)
WBC # FLD AUTO: 7.5 K/UL — SIGNIFICANT CHANGE UP (ref 4.8–10.8)

## 2020-03-18 PROCEDURE — 99232 SBSQ HOSP IP/OBS MODERATE 35: CPT

## 2020-03-18 RX ADMIN — Medication 1 APPLICATION(S): at 13:10

## 2020-03-18 RX ADMIN — HEPARIN SODIUM 5000 UNIT(S): 5000 INJECTION INTRAVENOUS; SUBCUTANEOUS at 05:39

## 2020-03-18 RX ADMIN — INSULIN GLARGINE 20 UNIT(S): 100 INJECTION, SOLUTION SUBCUTANEOUS at 21:19

## 2020-03-18 RX ADMIN — Medication 0: at 12:40

## 2020-03-18 RX ADMIN — TAMSULOSIN HYDROCHLORIDE 0.4 MILLIGRAM(S): 0.4 CAPSULE ORAL at 21:18

## 2020-03-18 RX ADMIN — Medication 81 MILLIGRAM(S): at 12:41

## 2020-03-18 RX ADMIN — HEPARIN SODIUM 5000 UNIT(S): 5000 INJECTION INTRAVENOUS; SUBCUTANEOUS at 21:18

## 2020-03-18 RX ADMIN — NYSTATIN CREAM 1 APPLICATION(S): 100000 CREAM TOPICAL at 13:10

## 2020-03-18 RX ADMIN — Medication 0: at 17:00

## 2020-03-18 RX ADMIN — Medication 50 MILLIGRAM(S): at 05:42

## 2020-03-18 RX ADMIN — MIDODRINE HYDROCHLORIDE 10 MILLIGRAM(S): 2.5 TABLET ORAL at 05:51

## 2020-03-18 RX ADMIN — PANTOPRAZOLE SODIUM 40 MILLIGRAM(S): 20 TABLET, DELAYED RELEASE ORAL at 21:17

## 2020-03-18 RX ADMIN — MIDODRINE HYDROCHLORIDE 10 MILLIGRAM(S): 2.5 TABLET ORAL at 13:08

## 2020-03-18 RX ADMIN — CHLORHEXIDINE GLUCONATE 1 APPLICATION(S): 213 SOLUTION TOPICAL at 05:39

## 2020-03-18 RX ADMIN — Medication 1 APPLICATION(S): at 19:00

## 2020-03-18 RX ADMIN — Medication 1 APPLICATION(S): at 05:42

## 2020-03-18 RX ADMIN — Medication 6 UNIT(S): at 08:46

## 2020-03-18 RX ADMIN — PRASUGREL 10 MILLIGRAM(S): 5 TABLET, FILM COATED ORAL at 13:08

## 2020-03-18 RX ADMIN — Medication 6 UNIT(S): at 17:00

## 2020-03-18 RX ADMIN — MIDODRINE HYDROCHLORIDE 10 MILLIGRAM(S): 2.5 TABLET ORAL at 17:02

## 2020-03-18 RX ADMIN — Medication 1 APPLICATION(S): at 05:40

## 2020-03-18 RX ADMIN — ATORVASTATIN CALCIUM 40 MILLIGRAM(S): 80 TABLET, FILM COATED ORAL at 21:17

## 2020-03-18 RX ADMIN — DULOXETINE HYDROCHLORIDE 90 MILLIGRAM(S): 30 CAPSULE, DELAYED RELEASE ORAL at 13:08

## 2020-03-18 RX ADMIN — HEPARIN SODIUM 5000 UNIT(S): 5000 INJECTION INTRAVENOUS; SUBCUTANEOUS at 13:09

## 2020-03-18 RX ADMIN — NYSTATIN CREAM 1 APPLICATION(S): 100000 CREAM TOPICAL at 21:18

## 2020-03-18 RX ADMIN — Medication 6 UNIT(S): at 12:39

## 2020-03-18 RX ADMIN — NYSTATIN CREAM 1 APPLICATION(S): 100000 CREAM TOPICAL at 05:43

## 2020-03-18 RX ADMIN — Medication 1 APPLICATION(S): at 17:31

## 2020-03-18 RX ADMIN — Medication 0.12 MILLIGRAM(S): at 05:43

## 2020-03-18 NOTE — PROGRESS NOTE ADULT - SUBJECTIVE AND OBJECTIVE BOX
Hospital Day:  26d    Subjective:    Patient is a 64y old  Male who presents with a chief complaint of Altered mental status found to have K. pneumoniae UTI with hospital course complicated by cholecystitis s/p drain. Overnight no acute events. This morning resting comfortably in bed. ROS is negative for acute symptoms (no cough, fever, chills, SOB, abdominal pain, n/v/d). States he is eating and drinking, having normal BM and urinating without issue.       Past Medical Hx:   Diabetic neuropathy  STEMI (ST elevation myocardial infarction)  Diverticulitis  MRSA bacteremia  History of celiac disease  CHF (congestive heart failure)  HTN (hypertension)  Diabetes  Blood clot due to device, implant, or graft  CKD (chronic kidney disease) stage 2, GFR 60-89 ml/min  COPD, moderate  Hyperkalemia  HLD (hyperlipidemia)  Chronic systolic CHF (congestive heart failure)  Osteoarthritis  HTN (hypertension)  Type 2 diabetes mellitus with neurological manifestations  Type 2 diabetes mellitus  History of surgery to heart and great vessels, presenting hazards to health  Atherosclerosis of coronary artery  Status post percutaneous transluminal coronary angioplasty    Past Sx:  History of open reduction and internal fixation (ORIF) procedure  Surgery, elective  Surgery, elective  S/P TKR (total knee replacement), right  S/P CABG x 1  Stented coronary artery  History of surgery to major organs, presenting hazards to health  Other postprocedural status    Allergies:  ACE inhibitors (Hives)  enalapril (Hives)    Current Meds:   Standng Meds:  aspirin enteric coated 81 milliGRAM(s) Oral daily  atorvastatin Oral Tab/Cap - Peds 40 milliGRAM(s) Oral daily  BACItracin   Ointment 1 Application(s) Topical two times a day  chlorhexidine 4% Liquid 1 Application(s) Topical <User Schedule>  collagenase Ointment 1 Application(s) Topical daily  dextrose 5%. 1000 milliLiter(s) (50 mL/Hr) IV Continuous <Continuous>  dextrose 50% Injectable 12.5 Gram(s) IV Push once  dextrose 50% Injectable 25 Gram(s) IV Push once  dextrose 50% Injectable 25 Gram(s) IV Push once  digoxin     Tablet 0.125 milliGRAM(s) Oral daily  DULoxetine 90 milliGRAM(s) Oral daily  heparin  Injectable 5000 Unit(s) SubCutaneous every 8 hours  insulin glargine Injectable (LANTUS) 20 Unit(s) SubCutaneous at bedtime  insulin lispro (HumaLOG) corrective regimen sliding scale   SubCutaneous three times a day before meals  insulin lispro Injectable (HumaLOG) 6 Unit(s) SubCutaneous three times a day before meals  metoprolol succinate ER 50 milliGRAM(s) Oral daily  midodrine. 10 milliGRAM(s) Oral three times a day  nystatin Powder 1 Application(s) Topical every 8 hours  pantoprazole    Tablet 40 milliGRAM(s) Oral at bedtime  prasugrel 10 milliGRAM(s) Oral daily  silver sulfADIAZINE 1% Cream 1 Application(s) Topical two times a day  tamsulosin Oral Tab/Cap - Peds 0.4 milliGRAM(s) Oral at bedtime    PRN Meds:  acetaminophen   Tablet .. 650 milliGRAM(s) Oral every 6 hours PRN Temp greater or equal to 38C (100.4F), Moderate Pain (4 - 6)  aluminum hydroxide/magnesium hydroxide/simethicone Suspension 30 milliLiter(s) Oral every 6 hours PRN Dyspepsia  dextrose 40% Gel 15 Gram(s) Oral once PRN Blood Glucose LESS THAN 70 milliGRAM(s)/deciliter  glucagon  Injectable 1 milliGRAM(s) IntraMuscular once PRN Glucose LESS THAN 70 milligrams/deciliter  ondansetron Injectable 4 milliGRAM(s) IV Push once PRN Nausea and/or Vomiting    HOME MEDICATIONS:  aspirin 81 mg oral delayed release tablet: 1 tab(s) orally once a day  atorvastatin 40 mg oral tablet: 1 tab(s) orally once a day  digoxin 125 mcg (0.125 mg) oral tablet: 1 tab(s) orally once a day  DULoxetine 30 mg oral delayed release capsule: 3 cap(s) orally once a day  Jardiance:   Metoprolol Succinate ER 50 mg oral tablet, extended release: 3 tab(s) orally once a day  pantoprazole 40 mg oral delayed release tablet: 1 tab(s) orally once a day  prasugrel 10 mg oral tablet: 1 tab(s) orally once a day  tamsulosin 0.4 mg oral capsule: 1 cap(s) orally once a day (at bedtime)  Trulicity Pen: subcutaneous once a week      Vital Signs:   T(F): 98.4 (03-18-20 @ 10:59), Max: 98.4 (03-18-20 @ 10:59)  HR: 71 (03-18-20 @ 10:59) (71 - 80)  BP: 108/58 (03-18-20 @ 10:59) (103/53 - 122/60)  RR: 19 (03-18-20 @ 10:59) (18 - 20)  SpO2: 97% (03-18-20 @ 06:30) (96% - 97%)      03-17-20 @ 07:01  -  03-18-20 @ 07:00  --------------------------------------------------------  IN: 360 mL / OUT: 2070 mL / NET: -1710 mL    Physical Exam:  GENERAL: NAD, resting comfortably in bed   HEENT: NCAT  CHEST/LUNG: CTA b/l   HEART: Regular rate and rhythm; s1 s2 appreciated, No murmurs, rubs, or gallops  ABDOMEN: Soft, Nontender, Nondistended; Bowel sounds present  EXTREMITIES: No LE edema b/l  SKIN: no rashes, no new lesions  NERVOUS SYSTEM:  Alert & Oriented X3  LINES/CATHETERS: peripheral IV, biliary drainage     Labs:                        11.8   7.50  )-----------( 265      ( 18 Mar 2020 07:34 )             39.4     Neutophil% 74.7, Lymphocyte% 11.9, Monocyte% 8.7, Bands% 0.3 03-18-20 @ 07:34    18 Mar 2020 07:34    140    |  101    |  12     ----------------------------<  144    4.8     |  29     |  0.7      Ca    8.8        18 Mar 2020 07:34  Mg     1.9       18 Mar 2020 07:34    TPro  6.2    /  Alb  3.1    /  TBili  1.0    /  DBili  0.3    /  AST  31     /  ALT  38     /  AlkPhos  190    18 Mar 2020 07:34    Radiology: No new imaging in 24 hours

## 2020-03-18 NOTE — PROGRESS NOTE ADULT - ASSESSMENT
Assessment and Plan:    63yo M with PMHx HTN, DLD, CAD s/p CABG, HFrEF (25-30% on ECHO 2/2020, 20-25% on MUGA scan in 12/2019) s/p [facility] BiV placement 2/2020, DM (on insulin pump), pulmonary hypertension, Celiac disease, diverticulitis, s/p right total knee replacement October 2019 complicated by infection s/p abx course w/ PICC and  right thigh with skin graft, right hip fracture s/p ORIF 2/2020  was admitted for metabolic encephalopathy/ found to have klebsiella pneumoniae UTI. Hospital course got complicated by severe sepsis secondary to cholecystitis due to cholelithiasis s/p drain on 2/28 s/p Levaquin. Metabolic encephalopathy causing AMS has resolved. Hosp course also comp by BiV AICD malfuctioning s/p reprogramming by EPS. Lastly, pt found to have L 2nd digit ulcer with xray findings concerning of OM, podiatry following.     # roommate w/ R/O Covid patient who has now moved  - pt had contact with someone who is being ruled out for COVID  - Patient being monitored in the CEU pending the results of COVID testing on his former roommate  - per ID, no need for testing patient as he is asymptomatic  - remain on droplet precaution until roommate r/o    #CHFrEF (EF 25-30%)  - Noted recent BiV AICD malfunctioning., reprogrammed by EP on 3/16  - CTS was following but no need surgical intervention, can follow up out patient   - Continue on Digoxin 0.125 and metoprolol 50mg daily  - digoxin level WNL    #Resolved Sepsis secondary to acute cholecystitis   - T. bili this morning normal (improved from 1.5 day proir)   - Noted hydropic gallbladder with stones and sludge  - s/p perc choley 2/28 & course of Levaquin  - per contact w/ IR on 3/16 pt will have drain for at least 6 wks   - patient should then f/u w/ Dr. Garibay ([phone]) for either surgery or tube exchange     #Hyperkalemia, resolved   - dig level WNL  - continue on renal diet  - monitor daily    # L foot 2nd digit pressure ulcer, stable   - XR left foot - suggestive of OM  - ulcer probes to bone  - podiatry on board  - continue local wound care  - per ID - no abx needed     #CAD s/p CABG and PCI, controlled   - Continue with ASA, Atorvastatin, and Metoprolol  - Prasugrel continue    #Celiac, controlled   - Continue on gluten restricted diet    #Type 2 DM, uncontrolled   - some elevated FS over past 24 hours (previously controlled)   - will Continue to monitor finger sticks & make adjustment if continued to be elevated   - Continue on Insulin regimen: Lantus 20, Humalog 6/6/6    #HTN (stable)    #Anxiety, controlled   - Continue on Cymbalta     #Skin graft, stable   - Wound care as per burn team    Activity; As tolerated  Diet: Carb Consistent DASH diet  DVT ppx: heparin Sub Q  GI ppx: Protonix  Code Status: Full Code  DISPO: From [place], likely go to 4a - pending bili resolution

## 2020-03-18 NOTE — PROGRESS NOTE ADULT - SUBJECTIVE AND OBJECTIVE BOX
Patient is a 64y old  Male who presents with a chief complaint of Altered mental status (18 Mar 2020 11:02)    Patient was seen and examined.  no new events  Pt denies any complaints Pt was transfered from  to CEU, for isolation. He had a room mate being ruled out for COVID-19. Pt will need to be monitored pending COVID results of his room mate  All systems reviewed .    PAST MEDICAL & SURGICAL HISTORY:  Diabetic neuropathy  STEMI (ST elevation myocardial infarction)  Diverticulitis  MRSA bacteremia  History of celiac disease  CHF (congestive heart failure): EF ~ 25%  HTN (hypertension)  Diabetes: on insulin pump  Blood clot due to device, implant, or graft: was on blood thinners  HLD (hyperlipidemia)  Osteoarthritis  Atherosclerosis of coronary artery: CAD (coronary artery disease)  Status post percutaneous transluminal coronary angioplasty: in 2012  History of open reduction and internal fixation (ORIF) procedure  Surgery, elective: Right shoulder  Surgery, elective: right knee wound debridement  S/P TKR (total knee replacement), right: with infection Mrsa   per pt he was cleared from MRSA infection  S/P CABG x 1: 2018  Stented coronary artery: 10/18 heart attack  INFERIOR WALL MI  Other postprocedural status: Fixation hardware in foot LEFT      Allergies  ACE inhibitors (Hives)  enalapril (Hives)    MEDICATIONS  (STANDING):  aspirin enteric coated 81 milliGRAM(s) Oral daily  atorvastatin Oral Tab/Cap - Peds 40 milliGRAM(s) Oral daily  BACItracin   Ointment 1 Application(s) Topical two times a day  chlorhexidine 4% Liquid 1 Application(s) Topical <User Schedule>  collagenase Ointment 1 Application(s) Topical daily  dextrose 5%. 1000 milliLiter(s) (50 mL/Hr) IV Continuous <Continuous>  dextrose 50% Injectable 12.5 Gram(s) IV Push once  dextrose 50% Injectable 25 Gram(s) IV Push once  dextrose 50% Injectable 25 Gram(s) IV Push once  digoxin     Tablet 0.125 milliGRAM(s) Oral daily  DULoxetine 90 milliGRAM(s) Oral daily  heparin  Injectable 5000 Unit(s) SubCutaneous every 8 hours  insulin glargine Injectable (LANTUS) 20 Unit(s) SubCutaneous at bedtime  insulin lispro (HumaLOG) corrective regimen sliding scale   SubCutaneous three times a day before meals  insulin lispro Injectable (HumaLOG) 6 Unit(s) SubCutaneous three times a day before meals  metoprolol succinate ER 50 milliGRAM(s) Oral daily  midodrine. 10 milliGRAM(s) Oral three times a day  nystatin Powder 1 Application(s) Topical every 8 hours  pantoprazole    Tablet 40 milliGRAM(s) Oral at bedtime  prasugrel 10 milliGRAM(s) Oral daily  silver sulfADIAZINE 1% Cream 1 Application(s) Topical two times a day  tamsulosin Oral Tab/Cap - Peds 0.4 milliGRAM(s) Oral at bedtime    MEDICATIONS  (PRN):  acetaminophen   Tablet .. 650 milliGRAM(s) Oral every 6 hours PRN Temp greater or equal to 38C (100.4F), Moderate Pain (4 - 6)  aluminum hydroxide/magnesium hydroxide/simethicone Suspension 30 milliLiter(s) Oral every 6 hours PRN Dyspepsia  dextrose 40% Gel 15 Gram(s) Oral once PRN Blood Glucose LESS THAN 70 milliGRAM(s)/deciliter  glucagon  Injectable 1 milliGRAM(s) IntraMuscular once PRN Glucose LESS THAN 70 milligrams/deciliter  ondansetron Injectable 4 milliGRAM(s) IV Push once PRN Nausea and/or Vomiting    Vital Signs Last 24 Hrs  T(C): 36.9  T(F): 98.4  HR: 71  BP: 108/58  BP(mean): 80  RR: 19  SpO2: 97%  O/E:  Awake, alert, not in distress.  HEENT: atraumatic, EOMI.  Chest: clear.  CVS: SIS2 +, no murmur.  P/A: Soft, BS+, drain+  CNS: non focal.  Ext: no edema feet.  Skin: no rash, no ulcers.  All systems reviewed positive findings as above.  CAPILLARY BLOOD GLUCOSE      POCT Blood Glucose.: 132 mg/dL (18 Mar 2020 08:30)  POCT Blood Glucose.: 229 mg/dL (17 Mar 2020 21:27)  POCT Blood Glucose.: 190 mg/dL (17 Mar 2020 16:51)  CAPILLARY BLOOD GLUCOSE      POCT Blood Glucose.: 132 mg/dL (18 Mar 2020 08:30)                          11.8<L>  7.50  )-----------( 265      ( 18 Mar 2020 07:34 )             39.4<L>                        11.0<L>  6.24  )-----------( 270      ( 17 Mar 2020 06:08 )             37.0<L>    03-18    140  |  101  |  12  ----------------------------<  144<H>  4.8   |  29  |  0.7  03-17    136  |  99  |  18  ----------------------------<  260<H>  4.6   |  29  |  0.8    Ca    8.8      18 Mar 2020 07:34  Ca    8.6      17 Mar 2020 06:08  Ca    8.8      16 Mar 2020 18:15  Mg     1.9     03-18    TPro  6.2  /  Alb  3.1<L>  /  TBili  1.0  /  DBili  0.3<H>  /  AST  31  /  ALT  38  /  AlkPhos  190<H>  03-18  TPro  6.2  /  Alb  3.1<L>  /  TBili  1.5<H>  /  DBili  x   /  AST  30  /  ALT  36  /  AlkPhos  177<H>  03-17 Patient is a 64y old  Male who presents with a chief complaint of Altered mental status (18 Mar 2020 11:02)    Patient was seen and examined.  no new events  Pt denies any complaints Pt was transfered from  to CEU, for isolation. He had a room mate being ruled out for COVID-19. Pt will need to be monitored pending COVID results of his room mate  All systems reviewed .    PAST MEDICAL & SURGICAL HISTORY:  Diabetic neuropathy  STEMI (ST elevation myocardial infarction)  Diverticulitis  MRSA bacteremia  History of celiac disease  CHF (congestive heart failure): EF ~ 25%  HTN (hypertension)  Diabetes: on insulin pump  Blood clot due to device, implant, or graft: was on blood thinners  HLD (hyperlipidemia)  Osteoarthritis  Atherosclerosis of coronary artery: CAD (coronary artery disease)  Status post percutaneous transluminal coronary angioplasty: in 2012  History of open reduction and internal fixation (ORIF) procedure  Surgery, elective: Right shoulder  Surgery, elective: right knee wound debridement  S/P TKR (total knee replacement), right: with infection Mrsa   per pt he was cleared from MRSA infection  S/P CABG x 1: 2018  Stented coronary artery: 10/18 heart attack  INFERIOR WALL MI  Other postprocedural status: Fixation hardware in foot LEFT      Allergies  ACE inhibitors (Hives)  enalapril (Hives)    MEDICATIONS  (STANDING):  aspirin enteric coated 81 milliGRAM(s) Oral daily  atorvastatin Oral Tab/Cap - Peds 40 milliGRAM(s) Oral daily  BACItracin   Ointment 1 Application(s) Topical two times a day  chlorhexidine 4% Liquid 1 Application(s) Topical <User Schedule>  collagenase Ointment 1 Application(s) Topical daily  dextrose 5%. 1000 milliLiter(s) (50 mL/Hr) IV Continuous <Continuous>  dextrose 50% Injectable 12.5 Gram(s) IV Push once  dextrose 50% Injectable 25 Gram(s) IV Push once  dextrose 50% Injectable 25 Gram(s) IV Push once  digoxin     Tablet 0.125 milliGRAM(s) Oral daily  DULoxetine 90 milliGRAM(s) Oral daily  heparin  Injectable 5000 Unit(s) SubCutaneous every 8 hours  insulin glargine Injectable (LANTUS) 20 Unit(s) SubCutaneous at bedtime  insulin lispro (HumaLOG) corrective regimen sliding scale   SubCutaneous three times a day before meals  insulin lispro Injectable (HumaLOG) 6 Unit(s) SubCutaneous three times a day before meals  metoprolol succinate ER 50 milliGRAM(s) Oral daily  midodrine. 10 milliGRAM(s) Oral three times a day  nystatin Powder 1 Application(s) Topical every 8 hours  pantoprazole    Tablet 40 milliGRAM(s) Oral at bedtime  prasugrel 10 milliGRAM(s) Oral daily  silver sulfADIAZINE 1% Cream 1 Application(s) Topical two times a day  tamsulosin Oral Tab/Cap - Peds 0.4 milliGRAM(s) Oral at bedtime    MEDICATIONS  (PRN):  acetaminophen   Tablet .. 650 milliGRAM(s) Oral every 6 hours PRN Temp greater or equal to 38C (100.4F), Moderate Pain (4 - 6)  aluminum hydroxide/magnesium hydroxide/simethicone Suspension 30 milliLiter(s) Oral every 6 hours PRN Dyspepsia  dextrose 40% Gel 15 Gram(s) Oral once PRN Blood Glucose LESS THAN 70 milliGRAM(s)/deciliter  glucagon  Injectable 1 milliGRAM(s) IntraMuscular once PRN Glucose LESS THAN 70 milligrams/deciliter  ondansetron Injectable 4 milliGRAM(s) IV Push once PRN Nausea and/or Vomiting    Vital Signs Last 24 Hrs  T(C): 36.9  T(F): 98.4  HR: 71  BP: 108/58  BP(mean): 80  RR: 19  SpO2: 97%  O/E:  Awake, alert, not in distress.  HEENT: atraumatic, EOMI.  Chest: clear.  CVS: SIS2 +, no murmur.  P/A: Soft, BS+, drain+  CNS: non focal.  Ext: no edema feet.  Skin: no rash, no ulcers.  All systems reviewed positive findings as above.    POCT Blood Glucose.: 132 mg/dL (18 Mar 2020 08:30)  POCT Blood Glucose.: 229 mg/dL (17 Mar 2020 21:27)  POCT Blood Glucose.: 190 mg/dL (17 Mar 2020 16:51)                          11.8<L>  7.50  )-----------( 265      ( 18 Mar 2020 07:34 )             39.4<L>                        11.0<L>  6.24  )-----------( 270      ( 17 Mar 2020 06:08 )             37.0<L>    03-18    140  |  101  |  12  ----------------------------<  144<H>  4.8   |  29  |  0.7  03-17    136  |  99  |  18  ----------------------------<  260<H>  4.6   |  29  |  0.8    Ca    8.8      18 Mar 2020 07:34  Ca    8.6      17 Mar 2020 06:08  Ca    8.8      16 Mar 2020 18:15  Mg     1.9     03-18    TPro  6.2  /  Alb  3.1<L>  /  TBili  1.0  /  DBili  0.3<H>  /  AST  31  /  ALT  38  /  AlkPhos  190<H>  03-18  TPro  6.2  /  Alb  3.1<L>  /  TBili  1.5<H>  /  DBili  x   /  AST  30  /  ALT  36  /  AlkPhos  177<H>  03-17 Patient is a 64y old  Male who presents with a chief complaint of Altered mental status (18 Mar 2020 11:02)    Patient was seen and examined.  no new events  Pt denies any complaints Pt was transfered from  to CEU, for isolation. He had a room mate being ruled out for COVID-19. Pt will need to be monitored pending COVID results of his room mate  All systems reviewed .    PAST MEDICAL & SURGICAL HISTORY:  Diabetic neuropathy  STEMI (ST elevation myocardial infarction)  Diverticulitis  MRSA bacteremia  History of celiac disease  CHF (congestive heart failure): EF ~ 25%  HTN (hypertension)  Diabetes: on insulin pump  Blood clot due to device, implant, or graft: was on blood thinners  HLD (hyperlipidemia)  Osteoarthritis  Atherosclerosis of coronary artery: CAD (coronary artery disease)  Status post percutaneous transluminal coronary angioplasty: in 2012  History of open reduction and internal fixation (ORIF) procedure  Surgery, elective: Right shoulder  Surgery, elective: right knee wound debridement  S/P TKR (total knee replacement), right: with infection Mrsa   per pt he was cleared from MRSA infection  S/P CABG x 1: 2018  Stented coronary artery: 10/18 heart attack  INFERIOR WALL MI  Other postprocedural status: Fixation hardware in foot LEFT      Allergies  ACE inhibitors (Hives)  enalapril (Hives)    Home Medications:  aspirin 81 mg oral delayed release tablet: 1 tab(s) orally once a day (31 Jan 2020 06:21)  atorvastatin 40 mg oral tablet: 1 tab(s) orally once a day (31 Jan 2020 06:21)  digoxin 125 mcg (0.125 mg) oral tablet: 1 tab(s) orally once a day (04 Mar 2020 08:40)  DULoxetine 30 mg oral delayed release capsule: 3 cap(s) orally once a day (31 Jan 2020 08:32)  Jardiance:  (31 Jan 2020 08:33)  Metoprolol Succinate ER 50 mg oral tablet, extended release: 3 tab(s) orally once a day (31 Jan 2020 06:21)  pantoprazole 40 mg oral delayed release tablet: 1 tab(s) orally once a day (31 Jan 2020 06:21)  prasugrel 10 mg oral tablet: 1 tab(s) orally once a day (31 Jan 2020 06:21)  tamsulosin 0.4 mg oral capsule: 1 cap(s) orally once a day (at bedtime) (13 Feb 2020 11:05)  Trulicity Pen: subcutaneous once a week (31 Jan 2020 08:33)      MEDICATIONS  (STANDING):  aspirin enteric coated 81 milliGRAM(s) Oral daily  atorvastatin Oral Tab/Cap - Peds 40 milliGRAM(s) Oral daily  BACItracin   Ointment 1 Application(s) Topical two times a day  chlorhexidine 4% Liquid 1 Application(s) Topical <User Schedule>  collagenase Ointment 1 Application(s) Topical daily  digoxin     Tablet 0.125 milliGRAM(s) Oral daily  DULoxetine 90 milliGRAM(s) Oral daily  heparin  Injectable 5000 Unit(s) SubCutaneous every 8 hours  insulin glargine Injectable (LANTUS) 20 Unit(s) SubCutaneous at bedtime  insulin lispro (HumaLOG) corrective regimen sliding scale   SubCutaneous three times a day before meals  insulin lispro Injectable (HumaLOG) 6 Unit(s) SubCutaneous three times a day before meals  metoprolol succinate ER 50 milliGRAM(s) Oral daily  midodrine. 10 milliGRAM(s) Oral three times a day  nystatin Powder 1 Application(s) Topical every 8 hours  pantoprazole    Tablet 40 milliGRAM(s) Oral at bedtime  prasugrel 10 milliGRAM(s) Oral daily  silver sulfADIAZINE 1% Cream 1 Application(s) Topical two times a day  tamsulosin Oral Tab/Cap - Peds 0.4 milliGRAM(s) Oral at bedtime    MEDICATIONS  (PRN):  acetaminophen   Tablet .. 650 milliGRAM(s) Oral every 6 hours PRN Temp greater or equal to 38C (100.4F), Moderate Pain (4 - 6)  aluminum hydroxide/magnesium hydroxide/simethicone Suspension 30 milliLiter(s) Oral every 6 hours PRN Dyspepsia  dextrose 40% Gel 15 Gram(s) Oral once PRN Blood Glucose LESS THAN 70 milliGRAM(s)/deciliter  glucagon  Injectable 1 milliGRAM(s) IntraMuscular once PRN Glucose LESS THAN 70 milligrams/deciliter  ondansetron Injectable 4 milliGRAM(s) IV Push once PRN Nausea and/or Vomiting    Vital Signs Last 24 Hrs  T(C): 36.9  T(F): 98.4  HR: 71  BP: 108/58  BP(mean): 80  RR: 19  SpO2: 97%  O/E:  Awake, alert, not in distress.  HEENT: atraumatic, EOMI.  Chest: clear.  CVS: SIS2 +, no murmur.  P/A: Soft, BS+, RUQ  drain+  CNS: non focal.  Ext: no edema feet. left foot ulcer+  All systems reviewed positive findings as above.    POCT Blood Glucose.: 132 mg/dL (18 Mar 2020 08:30)  POCT Blood Glucose.: 229 mg/dL (17 Mar 2020 21:27)  POCT Blood Glucose.: 190 mg/dL (17 Mar 2020 16:51)                          11.8<L>  7.50  )-----------( 265      ( 18 Mar 2020 07:34 )             39.4<L>                        11.0<L>  6.24  )-----------( 270      ( 17 Mar 2020 06:08 )             37.0<L>    03-18    140  |  101  |  12  ----------------------------<  144<H>  4.8   |  29  |  0.7  03-17    136  |  99  |  18  ----------------------------<  260<H>  4.6   |  29  |  0.8    Ca    8.8      18 Mar 2020 07:34  Ca    8.6      17 Mar 2020 06:08  Ca    8.8      16 Mar 2020 18:15  Mg     1.9     03-18    TPro  6.2  /  Alb  3.1<L>  /  TBili  1.0  /  DBili  0.3<H>  /  AST  31  /  ALT  38  /  AlkPhos  190<H>  03-18  TPro  6.2  /  Alb  3.1<L>  /  TBili  1.5<H>  /  DBili  x   /  AST  30  /  ALT  36  /  AlkPhos  177<H>  03-17

## 2020-03-18 NOTE — PROGRESS NOTE ADULT - NSHPATTENDINGPLANDISCUSS_GEN_ALL_CORE
Dr. Anguiano
above
Housestaff
Housestaff
patient
House staff
MED TEAM
resident
House staff
Housestaff
House staff
resident

## 2020-03-18 NOTE — PROGRESS NOTE ADULT - ASSESSMENT
64 year old with  HTN, DLD, CAD s/p CABG, HFrEF (25-30% on ECHO 2/2020, 20-25% on MUGA scan in 12/2019) s/p st carlyle BiV placement 2/2020, DM (on insulin pump), pulmonary hypertension, Celiac disease, diverticulitis, s/p right total knee replacement October 2019 complicated by infection s/p abx course w/ PICC and  right thigh with skin graft, right hip fracture s/p ORIF 2/2020   The patient as send here from NH where he was for rehab after his recent ORIF. Per the records, patient was agitated and combative with staff. Patient was claiming "there was a person sneaking around by his room and he wanted the staff from NH to call the Police. 65yo M with PMHx HTN, DLD, CAD s/p CABG, HFrEF (25-30% on ECHO 2/2020, 20-25% on MUGA scan in 12/2019) s/p st carlyle BiV placement 2/2020, DM (on insulin pump), pulmonary hypertension, Celiac disease, diverticulitis, s/p right total knee replacement October 2019 complicated by infection s/p abx course, right hip fracture s/p ORIF 2/2020  was diagnosed with  metabolic encephalopathy  sec to  klebsiella pneumoniae UTI. Severe sepsis secondary to cholecystitis due to cholelithiasis s/p drain on 2/28 s/p Levaquin.  BiV AICD malfuctioning s/p reprogramming by EPS.  L 2nd digit ulcer with xray findings concerning of OM.  Pt was transfered to CEU  on 3/18/20 for isolation. He had a room mate on 3 C  being ruled out for COVID-19. Pt will need to be monitored pending COVID results of his room mate 65yo M with PMHx HTN, DLD, CAD s/p CABG, HFrEF (25-30% on ECHO 2/2020, 20-25% on MUGA scan in 12/2019) s/p st carlyle BiV placement 2/2020, DM (on insulin pump), pulmonary hypertension, Celiac disease, diverticulitis, s/p right total knee replacement October 2019 complicated by infection s/p abx course, right hip fracture s/p ORIF 2/2020  was diagnosed with  metabolic encephalopathy  sec to  klebsiella pneumoniae UTI. Severe sepsis secondary to cholecystitis due to cholelithiasis s/p drain on 2/28 s/p Levaquin.  BiV AICD malfuctioning s/p reprogramming by EPS.  L 2nd digit ulcer with xray findings concerning of OM.  Pt was transfered to CEU  on 3/18/20 for isolation. He had a room mate on 3 C  being ruled out for COVID-19. Pt will need to be monitored pending COVID results of his room mate    # Contact with R/o for COVID patient  - remain on droplet isolation  until roommate r/o  - per ID, no need for testing patient as he is asymptomatic    #Sepsis secondary to acute cholecystitis -resolved  - CT Abdomen and Pelvis w/ IV Cont (02.27.20 @ 10:28) >Hydropic gallbladder measuring approximately 11.6 x 4.9 cm with surrounding fat stranding and small amount of cholelithiasis. Correlation with right upper quadrant ultrasound from same day is recommended.  - US Abdomen Limited (02.26.20 @ 14:45) >Hydropic gallbladder filled with stones and sludge. No definite findings of acute cholecystitis.   No biliary ductal dilatation.  - s/p pPTC on  2/28 , completed course of antibiotics  -  per  IR on 3/16 pt will have drain for at least 6 wks   - outpatient  f/u w/ Dr. Garibay ([phone]) for either surgery or tube exchange     # Metabolic encephalopathy resolved    # UTI sec to klebsiella -resolved    # BiV AICD malfunctioning  -  reprogrammed by EP on 3/16  - CTS :  no need surgical intervention, out patient follow up    # Left  foot 2nd digit pressure ulcer  -  Xray Foot AP + Lateral + Oblique, Left (03.16.20 @ 15:14) >Second toe tip ulcer with middle phalangeal head erosion suggestive of osteomyelitis. Consider MR evaluation to evaluate extent of disease  - Podiatry eval: Local Wound Care; Santyl to Medial Malleolus / Xeroform to 2nd Digit; Plantar Aspect.    # Right knee abrasion on skin grafted knee - improved   - continue wound care with Xeroform/Joesph roll gauze after washing     # H/o CAD s/p CABG and PCI, H/o chronic systolic CHF, s/p st carlyle BiV placement 2/2020,   - c/w ASA, pasugrel, statin, metoprolol  - c/w Digoxin  - discontinue midodrine    # DM type 2 on insulin pump  - monitor FS  - c/w lantus, lispro    # H/o anxiety   - c/w  Cymbalta     # BPH  - c/w flomax    # DVT prophylaxis    # Full code    # Pending: COVID results of his contact   # Discussed plan of care with patients  # Disposition: 4A, once off isolation precautions

## 2020-03-19 ENCOUNTER — INPATIENT (INPATIENT)
Facility: HOSPITAL | Age: 65
LOS: 8 days | Discharge: HOME HEALTH | End: 2020-03-28
Attending: PHYSICAL MEDICINE & REHABILITATION | Admitting: PHYSICAL MEDICINE & REHABILITATION
Payer: COMMERCIAL

## 2020-03-19 ENCOUNTER — TRANSCRIPTION ENCOUNTER (OUTPATIENT)
Age: 65
End: 2020-03-19

## 2020-03-19 VITALS
HEART RATE: 77 BPM | OXYGEN SATURATION: 98 % | TEMPERATURE: 97 F | DIASTOLIC BLOOD PRESSURE: 64 MMHG | RESPIRATION RATE: 20 BRPM | SYSTOLIC BLOOD PRESSURE: 116 MMHG

## 2020-03-19 DIAGNOSIS — Z95.5 PRESENCE OF CORONARY ANGIOPLASTY IMPLANT AND GRAFT: Chronic | ICD-10-CM

## 2020-03-19 DIAGNOSIS — Z41.9 ENCOUNTER FOR PROCEDURE FOR PURPOSES OTHER THAN REMEDYING HEALTH STATE, UNSPECIFIED: Chronic | ICD-10-CM

## 2020-03-19 DIAGNOSIS — Z96.651 PRESENCE OF RIGHT ARTIFICIAL KNEE JOINT: Chronic | ICD-10-CM

## 2020-03-19 DIAGNOSIS — Z98.890 OTHER SPECIFIED POSTPROCEDURAL STATES: Chronic | ICD-10-CM

## 2020-03-19 DIAGNOSIS — Z95.1 PRESENCE OF AORTOCORONARY BYPASS GRAFT: Chronic | ICD-10-CM

## 2020-03-19 LAB
GLUCOSE BLDC GLUCOMTR-MCNC: 115 MG/DL — HIGH (ref 70–99)
GLUCOSE BLDC GLUCOMTR-MCNC: 185 MG/DL — HIGH (ref 70–99)
GLUCOSE BLDC GLUCOMTR-MCNC: 188 MG/DL — HIGH (ref 70–99)
GLUCOSE BLDC GLUCOMTR-MCNC: 194 MG/DL — HIGH (ref 70–99)
GLUCOSE BLDC GLUCOMTR-MCNC: 74 MG/DL — SIGNIFICANT CHANGE UP (ref 70–99)

## 2020-03-19 PROCEDURE — 99239 HOSP IP/OBS DSCHRG MGMT >30: CPT

## 2020-03-19 PROCEDURE — 93010 ELECTROCARDIOGRAM REPORT: CPT

## 2020-03-19 RX ORDER — METOPROLOL TARTRATE 50 MG
50 TABLET ORAL DAILY
Refills: 0 | Status: DISCONTINUED | OUTPATIENT
Start: 2020-03-19 | End: 2020-03-20

## 2020-03-19 RX ORDER — HYDROXYCHLOROQUINE SULFATE 200 MG
400 TABLET ORAL EVERY 12 HOURS
Refills: 0 | Status: DISCONTINUED | OUTPATIENT
Start: 2020-03-19 | End: 2020-03-19

## 2020-03-19 RX ORDER — ATORVASTATIN CALCIUM 80 MG/1
1 TABLET, FILM COATED ORAL
Qty: 0 | Refills: 0 | DISCHARGE

## 2020-03-19 RX ORDER — INSULIN LISPRO 100/ML
6 VIAL (ML) SUBCUTANEOUS
Refills: 0 | Status: DISCONTINUED | OUTPATIENT
Start: 2020-03-19 | End: 2020-03-28

## 2020-03-19 RX ORDER — BACITRACIN ZINC 500 UNIT/G
1 OINTMENT IN PACKET (EA) TOPICAL
Refills: 0 | Status: DISCONTINUED | OUTPATIENT
Start: 2020-03-19 | End: 2020-03-28

## 2020-03-19 RX ORDER — DIGOXIN 250 MCG
0.12 TABLET ORAL DAILY
Refills: 0 | Status: DISCONTINUED | OUTPATIENT
Start: 2020-03-19 | End: 2020-03-28

## 2020-03-19 RX ORDER — INSULIN LISPRO 100/ML
6 VIAL (ML) SUBCUTANEOUS ONCE
Refills: 0 | Status: COMPLETED | OUTPATIENT
Start: 2020-03-19 | End: 2020-03-19

## 2020-03-19 RX ORDER — INSULIN LISPRO 100/ML
1 VIAL (ML) SUBCUTANEOUS
Qty: 0 | Refills: 0 | DISCHARGE
Start: 2020-03-19

## 2020-03-19 RX ORDER — MIDODRINE HYDROCHLORIDE 2.5 MG/1
10 TABLET ORAL THREE TIMES A DAY
Refills: 0 | Status: DISCONTINUED | OUTPATIENT
Start: 2020-03-19 | End: 2020-03-19

## 2020-03-19 RX ORDER — MAGNESIUM HYDROXIDE 400 MG/1
30 TABLET, CHEWABLE ORAL DAILY
Refills: 0 | Status: DISCONTINUED | OUTPATIENT
Start: 2020-03-19 | End: 2020-03-28

## 2020-03-19 RX ORDER — COLLAGENASE CLOSTRIDIUM HIST. 250 UNIT/G
1 OINTMENT (GRAM) TOPICAL
Refills: 0 | Status: DISCONTINUED | OUTPATIENT
Start: 2020-03-19 | End: 2020-03-28

## 2020-03-19 RX ORDER — TAMSULOSIN HYDROCHLORIDE 0.4 MG/1
0.4 CAPSULE ORAL AT BEDTIME
Refills: 0 | Status: DISCONTINUED | OUTPATIENT
Start: 2020-03-19 | End: 2020-03-28

## 2020-03-19 RX ORDER — CHLORHEXIDINE GLUCONATE 213 G/1000ML
1 SOLUTION TOPICAL
Refills: 0 | Status: DISCONTINUED | OUTPATIENT
Start: 2020-03-19 | End: 2020-03-28

## 2020-03-19 RX ORDER — INSULIN GLARGINE 100 [IU]/ML
20 INJECTION, SOLUTION SUBCUTANEOUS
Qty: 0 | Refills: 0 | DISCHARGE
Start: 2020-03-19

## 2020-03-19 RX ORDER — ASPIRIN/CALCIUM CARB/MAGNESIUM 324 MG
81 TABLET ORAL DAILY
Refills: 0 | Status: DISCONTINUED | OUTPATIENT
Start: 2020-03-19 | End: 2020-03-28

## 2020-03-19 RX ORDER — COLLAGENASE CLOSTRIDIUM HIST. 250 UNIT/G
1 OINTMENT (GRAM) TOPICAL
Qty: 0 | Refills: 0 | DISCHARGE
Start: 2020-03-19

## 2020-03-19 RX ORDER — FUROSEMIDE 40 MG
1 TABLET ORAL
Qty: 0 | Refills: 0 | DISCHARGE
Start: 2020-03-19

## 2020-03-19 RX ORDER — DULAGLUTIDE 4.5 MG/.5ML
0 INJECTION, SOLUTION SUBCUTANEOUS
Qty: 0 | Refills: 0 | DISCHARGE

## 2020-03-19 RX ORDER — HEPARIN SODIUM 5000 [USP'U]/ML
5000 INJECTION INTRAVENOUS; SUBCUTANEOUS EVERY 8 HOURS
Refills: 0 | Status: DISCONTINUED | OUTPATIENT
Start: 2020-03-19 | End: 2020-03-28

## 2020-03-19 RX ORDER — DEXTROSE 50 % IN WATER 50 %
12.5 SYRINGE (ML) INTRAVENOUS ONCE
Refills: 0 | Status: DISCONTINUED | OUTPATIENT
Start: 2020-03-19 | End: 2020-03-28

## 2020-03-19 RX ORDER — PANTOPRAZOLE SODIUM 20 MG/1
40 TABLET, DELAYED RELEASE ORAL
Refills: 0 | Status: DISCONTINUED | OUTPATIENT
Start: 2020-03-19 | End: 2020-03-19

## 2020-03-19 RX ORDER — EMPAGLIFLOZIN 10 MG/1
0 TABLET, FILM COATED ORAL
Qty: 0 | Refills: 0 | DISCHARGE

## 2020-03-19 RX ORDER — INSULIN GLARGINE 100 [IU]/ML
20 INJECTION, SOLUTION SUBCUTANEOUS AT BEDTIME
Refills: 0 | Status: DISCONTINUED | OUTPATIENT
Start: 2020-03-19 | End: 2020-03-28

## 2020-03-19 RX ORDER — SODIUM CHLORIDE 9 MG/ML
1000 INJECTION, SOLUTION INTRAVENOUS
Refills: 0 | Status: DISCONTINUED | OUTPATIENT
Start: 2020-03-19 | End: 2020-03-28

## 2020-03-19 RX ORDER — PANTOPRAZOLE SODIUM 20 MG/1
40 TABLET, DELAYED RELEASE ORAL AT BEDTIME
Refills: 0 | Status: DISCONTINUED | OUTPATIENT
Start: 2020-03-19 | End: 2020-03-28

## 2020-03-19 RX ORDER — DEXTROSE 50 % IN WATER 50 %
25 SYRINGE (ML) INTRAVENOUS ONCE
Refills: 0 | Status: DISCONTINUED | OUTPATIENT
Start: 2020-03-19 | End: 2020-03-28

## 2020-03-19 RX ORDER — INSULIN LISPRO 100/ML
VIAL (ML) SUBCUTANEOUS
Refills: 0 | Status: DISCONTINUED | OUTPATIENT
Start: 2020-03-19 | End: 2020-03-28

## 2020-03-19 RX ORDER — DEXTROSE 50 % IN WATER 50 %
15 SYRINGE (ML) INTRAVENOUS ONCE
Refills: 0 | Status: DISCONTINUED | OUTPATIENT
Start: 2020-03-19 | End: 2020-03-28

## 2020-03-19 RX ORDER — DULOXETINE HYDROCHLORIDE 30 MG/1
90 CAPSULE, DELAYED RELEASE ORAL DAILY
Refills: 0 | Status: DISCONTINUED | OUTPATIENT
Start: 2020-03-19 | End: 2020-03-28

## 2020-03-19 RX ORDER — NYSTATIN CREAM 100000 [USP'U]/G
1 CREAM TOPICAL EVERY 12 HOURS
Refills: 0 | Status: DISCONTINUED | OUTPATIENT
Start: 2020-03-19 | End: 2020-03-28

## 2020-03-19 RX ORDER — ACETAMINOPHEN 500 MG
650 TABLET ORAL EVERY 4 HOURS
Refills: 0 | Status: DISCONTINUED | OUTPATIENT
Start: 2020-03-19 | End: 2020-03-28

## 2020-03-19 RX ORDER — PRASUGREL 5 MG/1
10 TABLET, FILM COATED ORAL DAILY
Refills: 0 | Status: DISCONTINUED | OUTPATIENT
Start: 2020-03-20 | End: 2020-03-28

## 2020-03-19 RX ORDER — ATORVASTATIN CALCIUM 80 MG/1
40 TABLET, FILM COATED ORAL AT BEDTIME
Refills: 0 | Status: DISCONTINUED | OUTPATIENT
Start: 2020-03-19 | End: 2020-03-28

## 2020-03-19 RX ORDER — GLUCAGON INJECTION, SOLUTION 0.5 MG/.1ML
1 INJECTION, SOLUTION SUBCUTANEOUS ONCE
Refills: 0 | Status: DISCONTINUED | OUTPATIENT
Start: 2020-03-19 | End: 2020-03-28

## 2020-03-19 RX ORDER — INSULIN LISPRO 100/ML
6 VIAL (ML) SUBCUTANEOUS
Qty: 0 | Refills: 0 | DISCHARGE
Start: 2020-03-19

## 2020-03-19 RX ORDER — MIDODRINE HYDROCHLORIDE 2.5 MG/1
10 TABLET ORAL
Refills: 0 | Status: DISCONTINUED | OUTPATIENT
Start: 2020-03-19 | End: 2020-03-21

## 2020-03-19 RX ADMIN — Medication 6 UNIT(S): at 08:37

## 2020-03-19 RX ADMIN — INSULIN GLARGINE 20 UNIT(S): 100 INJECTION, SOLUTION SUBCUTANEOUS at 21:33

## 2020-03-19 RX ADMIN — PANTOPRAZOLE SODIUM 40 MILLIGRAM(S): 20 TABLET, DELAYED RELEASE ORAL at 21:28

## 2020-03-19 RX ADMIN — Medication 6 UNIT(S): at 11:54

## 2020-03-19 RX ADMIN — NYSTATIN CREAM 1 APPLICATION(S): 100000 CREAM TOPICAL at 05:17

## 2020-03-19 RX ADMIN — Medication 1 APPLICATION(S): at 05:14

## 2020-03-19 RX ADMIN — Medication 81 MILLIGRAM(S): at 11:55

## 2020-03-19 RX ADMIN — HEPARIN SODIUM 5000 UNIT(S): 5000 INJECTION INTRAVENOUS; SUBCUTANEOUS at 14:47

## 2020-03-19 RX ADMIN — HEPARIN SODIUM 5000 UNIT(S): 5000 INJECTION INTRAVENOUS; SUBCUTANEOUS at 21:29

## 2020-03-19 RX ADMIN — NYSTATIN CREAM 1 APPLICATION(S): 100000 CREAM TOPICAL at 21:47

## 2020-03-19 RX ADMIN — ATORVASTATIN CALCIUM 40 MILLIGRAM(S): 80 TABLET, FILM COATED ORAL at 21:28

## 2020-03-19 RX ADMIN — Medication 0.12 MILLIGRAM(S): at 05:13

## 2020-03-19 RX ADMIN — TAMSULOSIN HYDROCHLORIDE 0.4 MILLIGRAM(S): 0.4 CAPSULE ORAL at 21:28

## 2020-03-19 RX ADMIN — HEPARIN SODIUM 5000 UNIT(S): 5000 INJECTION INTRAVENOUS; SUBCUTANEOUS at 05:13

## 2020-03-19 RX ADMIN — Medication 50 MILLIGRAM(S): at 05:14

## 2020-03-19 RX ADMIN — PRASUGREL 10 MILLIGRAM(S): 5 TABLET, FILM COATED ORAL at 11:56

## 2020-03-19 RX ADMIN — Medication 1 APPLICATION(S): at 14:48

## 2020-03-19 RX ADMIN — Medication 2: at 11:54

## 2020-03-19 RX ADMIN — Medication 1 APPLICATION(S): at 05:17

## 2020-03-19 RX ADMIN — MIDODRINE HYDROCHLORIDE 10 MILLIGRAM(S): 2.5 TABLET ORAL at 05:13

## 2020-03-19 RX ADMIN — DULOXETINE HYDROCHLORIDE 90 MILLIGRAM(S): 30 CAPSULE, DELAYED RELEASE ORAL at 11:56

## 2020-03-19 RX ADMIN — NYSTATIN CREAM 1 APPLICATION(S): 100000 CREAM TOPICAL at 14:47

## 2020-03-19 NOTE — CHART NOTE - NSCHARTNOTEFT_GEN_A_CORE
RD Limited Follow up Note:    Pt cont on consistent carb, DASH/TLC, Low fat, 1L fluid restriction, low potassium diet w/ ensure compact BID. Unable to interview pt face to face d/t limited contact restrictions r/t medical condition and isolation precautions. documented po intake 100%. LBM 3/18 per EMR. Stage 2 PU to sacral spine. CBW: 86.9 kg (3/17) - fluctuating weights during this adm. labs and meds reviewed. Pending: COVID results of his contact. No further nutrition interventions f/u in 7 days.

## 2020-03-19 NOTE — CHART NOTE - NSCHARTNOTEFT_GEN_A_CORE
<<<RESIDENT DISCHARGE NOTE>>>     FLOWER MARISCAL  MRN-[id number]    VITAL SIGNS:  T(F): 96.7 (03-19-20 @ 14:31), Max: 98 (03-18-20 @ 21:13)  HR: 77 (03-19-20 @ 14:31)  BP: 116/64 (03-19-20 @ 14:31)  SpO2: 98% (03-19-20 @ 14:31)      PHYSICAL EXAMINATION:  General: NAD  Head & Neck: NCAT  Pulmonary: CTA b/l  Cardiovascular: Regular rate & rhythm, normal s1 and s2  Gastrointestinal/Abdomen & Pelvis: Soft, non tender, non distended, normal bowel sounds  Neurologic/Motor: A&O x 3    TEST RESULTS:                        11.8   7.50  )-----------( 265      ( 18 Mar 2020 07:34 )             39.4       03-18    140  |  101  |  12  ----------------------------<  144<H>  4.8   |  29  |  0.7    Ca    8.8      18 Mar 2020 07:34  Mg     1.9     03-18    TPro  6.2  /  Alb  3.1<L>  /  TBili  1.0  /  DBili  0.3<H>  /  AST  31  /  ALT  38  /  AlkPhos  190<H>  03-18      FINAL DISCHARGE INTERVIEW:  Resident(s) Present: (Name: Christina Guillaume, RN Present: (Name:  Alyssa)    DISCHARGE MEDICATION RECONCILIATION  reviewed with Attending (Name: Dr. Meyer)    DISPOSITION:   [  ] Home,    [  ] Home with Visiting Nursing Services,   [    ]  SNF/ NH,    [x] Acute Rehab (4A),   [   ] Other (Specify:_________)

## 2020-03-19 NOTE — DISCHARGE NOTE NURSING/CASE MANAGEMENT/SOCIAL WORK - PATIENT PORTAL LINK FT
You can access the FollowMyHealth Patient Portal offered by [facility] by registering at the following website: [email or web address]. By joining [employer]’s FollowMyHealth portal, you will also be able to view your health information using other applications (apps) compatible with our system.

## 2020-03-20 DIAGNOSIS — L97.529 NON-PRESSURE CHRONIC ULCER OF OTHER PART OF LEFT FOOT WITH UNSPECIFIED SEVERITY: ICD-10-CM

## 2020-03-20 LAB
ALBUMIN SERPL ELPH-MCNC: 3.2 G/DL — LOW (ref 3.5–5.2)
ALP SERPL-CCNC: 191 U/L — HIGH (ref 30–115)
ALT FLD-CCNC: 35 U/L — SIGNIFICANT CHANGE UP (ref 0–41)
ANION GAP SERPL CALC-SCNC: 10 MMOL/L — SIGNIFICANT CHANGE UP (ref 7–14)
APPEARANCE UR: CLEAR — SIGNIFICANT CHANGE UP
AST SERPL-CCNC: 25 U/L — SIGNIFICANT CHANGE UP (ref 0–41)
BASOPHILS # BLD AUTO: 0.07 K/UL — SIGNIFICANT CHANGE UP (ref 0–0.2)
BASOPHILS NFR BLD AUTO: 1 % — SIGNIFICANT CHANGE UP (ref 0–1)
BILIRUB SERPL-MCNC: 1.2 MG/DL — SIGNIFICANT CHANGE UP (ref 0.2–1.2)
BILIRUB UR-MCNC: NEGATIVE — SIGNIFICANT CHANGE UP
BUN SERPL-MCNC: 15 MG/DL — SIGNIFICANT CHANGE UP (ref 10–20)
CALCIUM SERPL-MCNC: 9 MG/DL — SIGNIFICANT CHANGE UP (ref 8.5–10.1)
CHLORIDE SERPL-SCNC: 101 MMOL/L — SIGNIFICANT CHANGE UP (ref 98–110)
CO2 SERPL-SCNC: 29 MMOL/L — SIGNIFICANT CHANGE UP (ref 17–32)
COLOR SPEC: SIGNIFICANT CHANGE UP
CREAT SERPL-MCNC: 0.8 MG/DL — SIGNIFICANT CHANGE UP (ref 0.7–1.5)
DIFF PNL FLD: NEGATIVE — SIGNIFICANT CHANGE UP
DIGOXIN SERPL-MCNC: 0.6 NG/ML — LOW (ref 0.8–2)
EOSINOPHIL # BLD AUTO: 0.33 K/UL — SIGNIFICANT CHANGE UP (ref 0–0.7)
EOSINOPHIL NFR BLD AUTO: 4.5 % — SIGNIFICANT CHANGE UP (ref 0–8)
ESTIMATED AVERAGE GLUCOSE: 174 MG/DL — HIGH (ref 68–114)
GLUCOSE BLDC GLUCOMTR-MCNC: 124 MG/DL — HIGH (ref 70–99)
GLUCOSE BLDC GLUCOMTR-MCNC: 127 MG/DL — HIGH (ref 70–99)
GLUCOSE BLDC GLUCOMTR-MCNC: 169 MG/DL — HIGH (ref 70–99)
GLUCOSE BLDC GLUCOMTR-MCNC: 236 MG/DL — HIGH (ref 70–99)
GLUCOSE SERPL-MCNC: 136 MG/DL — HIGH (ref 70–99)
GLUCOSE UR QL: NEGATIVE — SIGNIFICANT CHANGE UP
HBA1C BLD-MCNC: 7.7 % — HIGH (ref 4–5.6)
HCT VFR BLD CALC: 37.9 % — LOW (ref 42–52)
HGB BLD-MCNC: 11.8 G/DL — LOW (ref 14–18)
IMM GRANULOCYTES NFR BLD AUTO: 0.3 % — SIGNIFICANT CHANGE UP (ref 0.1–0.3)
KETONES UR-MCNC: NEGATIVE — SIGNIFICANT CHANGE UP
LEUKOCYTE ESTERASE UR-ACNC: NEGATIVE — SIGNIFICANT CHANGE UP
LYMPHOCYTES # BLD AUTO: 0.91 K/UL — LOW (ref 1.2–3.4)
LYMPHOCYTES # BLD AUTO: 12.4 % — LOW (ref 20.5–51.1)
MAGNESIUM SERPL-MCNC: 1.8 MG/DL — SIGNIFICANT CHANGE UP (ref 1.8–2.4)
MCHC RBC-ENTMCNC: 24.1 PG — LOW (ref 27–31)
MCHC RBC-ENTMCNC: 31.1 G/DL — LOW (ref 32–37)
MCV RBC AUTO: 77.3 FL — LOW (ref 80–94)
MONOCYTES # BLD AUTO: 0.68 K/UL — HIGH (ref 0.1–0.6)
MONOCYTES NFR BLD AUTO: 9.3 % — SIGNIFICANT CHANGE UP (ref 1.7–9.3)
NEUTROPHILS # BLD AUTO: 5.31 K/UL — SIGNIFICANT CHANGE UP (ref 1.4–6.5)
NEUTROPHILS NFR BLD AUTO: 72.5 % — SIGNIFICANT CHANGE UP (ref 42.2–75.2)
NITRITE UR-MCNC: NEGATIVE — SIGNIFICANT CHANGE UP
NRBC # BLD: 0 /100 WBCS — SIGNIFICANT CHANGE UP (ref 0–0)
PH UR: 7.5 — SIGNIFICANT CHANGE UP (ref 5–8)
PLATELET # BLD AUTO: 267 K/UL — SIGNIFICANT CHANGE UP (ref 130–400)
POTASSIUM SERPL-MCNC: 5 MMOL/L — SIGNIFICANT CHANGE UP (ref 3.5–5)
POTASSIUM SERPL-SCNC: 5 MMOL/L — SIGNIFICANT CHANGE UP (ref 3.5–5)
PROT SERPL-MCNC: 6.1 G/DL — SIGNIFICANT CHANGE UP (ref 6–8)
PROT UR-MCNC: NEGATIVE — SIGNIFICANT CHANGE UP
RBC # BLD: 4.9 M/UL — SIGNIFICANT CHANGE UP (ref 4.7–6.1)
RBC # FLD: 27.3 % — HIGH (ref 11.5–14.5)
SODIUM SERPL-SCNC: 140 MMOL/L — SIGNIFICANT CHANGE UP (ref 135–146)
SP GR SPEC: 1.01 — LOW (ref 1.01–1.02)
UROBILINOGEN FLD QL: SIGNIFICANT CHANGE UP
WBC # BLD: 7.32 K/UL — SIGNIFICANT CHANGE UP (ref 4.8–10.8)
WBC # FLD AUTO: 7.32 K/UL — SIGNIFICANT CHANGE UP (ref 4.8–10.8)

## 2020-03-20 RX ORDER — METOPROLOL TARTRATE 50 MG
25 TABLET ORAL DAILY
Refills: 0 | Status: DISCONTINUED | OUTPATIENT
Start: 2020-03-20 | End: 2020-03-28

## 2020-03-20 RX ADMIN — HEPARIN SODIUM 5000 UNIT(S): 5000 INJECTION INTRAVENOUS; SUBCUTANEOUS at 21:43

## 2020-03-20 RX ADMIN — Medication 4: at 17:06

## 2020-03-20 RX ADMIN — Medication 50 MILLIGRAM(S): at 05:42

## 2020-03-20 RX ADMIN — TAMSULOSIN HYDROCHLORIDE 0.4 MILLIGRAM(S): 0.4 CAPSULE ORAL at 21:43

## 2020-03-20 RX ADMIN — MIDODRINE HYDROCHLORIDE 10 MILLIGRAM(S): 2.5 TABLET ORAL at 11:30

## 2020-03-20 RX ADMIN — Medication 1 APPLICATION(S): at 05:40

## 2020-03-20 RX ADMIN — Medication 6 UNIT(S): at 07:51

## 2020-03-20 RX ADMIN — Medication 2: at 11:29

## 2020-03-20 RX ADMIN — PRASUGREL 10 MILLIGRAM(S): 5 TABLET, FILM COATED ORAL at 11:30

## 2020-03-20 RX ADMIN — Medication 81 MILLIGRAM(S): at 11:30

## 2020-03-20 RX ADMIN — Medication 6 UNIT(S): at 17:07

## 2020-03-20 RX ADMIN — ATORVASTATIN CALCIUM 40 MILLIGRAM(S): 80 TABLET, FILM COATED ORAL at 21:43

## 2020-03-20 RX ADMIN — HEPARIN SODIUM 5000 UNIT(S): 5000 INJECTION INTRAVENOUS; SUBCUTANEOUS at 12:28

## 2020-03-20 RX ADMIN — NYSTATIN CREAM 1 APPLICATION(S): 100000 CREAM TOPICAL at 05:40

## 2020-03-20 RX ADMIN — Medication 6 UNIT(S): at 11:29

## 2020-03-20 RX ADMIN — NYSTATIN CREAM 1 APPLICATION(S): 100000 CREAM TOPICAL at 17:19

## 2020-03-20 RX ADMIN — PANTOPRAZOLE SODIUM 40 MILLIGRAM(S): 20 TABLET, DELAYED RELEASE ORAL at 21:43

## 2020-03-20 RX ADMIN — INSULIN GLARGINE 20 UNIT(S): 100 INJECTION, SOLUTION SUBCUTANEOUS at 21:43

## 2020-03-20 RX ADMIN — Medication 0.12 MILLIGRAM(S): at 05:40

## 2020-03-20 RX ADMIN — HEPARIN SODIUM 5000 UNIT(S): 5000 INJECTION INTRAVENOUS; SUBCUTANEOUS at 05:41

## 2020-03-20 RX ADMIN — DULOXETINE HYDROCHLORIDE 90 MILLIGRAM(S): 30 CAPSULE, DELAYED RELEASE ORAL at 11:30

## 2020-03-20 RX ADMIN — Medication 0: at 07:51

## 2020-03-20 RX ADMIN — CHLORHEXIDINE GLUCONATE 1 APPLICATION(S): 213 SOLUTION TOPICAL at 05:41

## 2020-03-21 LAB
GLUCOSE BLDC GLUCOMTR-MCNC: 104 MG/DL — HIGH (ref 70–99)
GLUCOSE BLDC GLUCOMTR-MCNC: 127 MG/DL — HIGH (ref 70–99)
GLUCOSE BLDC GLUCOMTR-MCNC: 162 MG/DL — HIGH (ref 70–99)
GLUCOSE BLDC GLUCOMTR-MCNC: 229 MG/DL — HIGH (ref 70–99)

## 2020-03-21 RX ORDER — MIDODRINE HYDROCHLORIDE 2.5 MG/1
10 TABLET ORAL
Refills: 0 | Status: DISCONTINUED | OUTPATIENT
Start: 2020-03-21 | End: 2020-03-28

## 2020-03-21 RX ADMIN — Medication 4: at 16:45

## 2020-03-21 RX ADMIN — DULOXETINE HYDROCHLORIDE 90 MILLIGRAM(S): 30 CAPSULE, DELAYED RELEASE ORAL at 12:41

## 2020-03-21 RX ADMIN — PANTOPRAZOLE SODIUM 40 MILLIGRAM(S): 20 TABLET, DELAYED RELEASE ORAL at 21:12

## 2020-03-21 RX ADMIN — Medication 25 MILLIGRAM(S): at 05:42

## 2020-03-21 RX ADMIN — NYSTATIN CREAM 1 APPLICATION(S): 100000 CREAM TOPICAL at 17:16

## 2020-03-21 RX ADMIN — Medication 0: at 07:54

## 2020-03-21 RX ADMIN — ATORVASTATIN CALCIUM 40 MILLIGRAM(S): 80 TABLET, FILM COATED ORAL at 21:12

## 2020-03-21 RX ADMIN — Medication 81 MILLIGRAM(S): at 12:41

## 2020-03-21 RX ADMIN — TAMSULOSIN HYDROCHLORIDE 0.4 MILLIGRAM(S): 0.4 CAPSULE ORAL at 21:15

## 2020-03-21 RX ADMIN — HEPARIN SODIUM 5000 UNIT(S): 5000 INJECTION INTRAVENOUS; SUBCUTANEOUS at 13:37

## 2020-03-21 RX ADMIN — Medication 6 UNIT(S): at 12:40

## 2020-03-21 RX ADMIN — Medication 6 UNIT(S): at 16:45

## 2020-03-21 RX ADMIN — Medication 650 MILLIGRAM(S): at 08:18

## 2020-03-21 RX ADMIN — NYSTATIN CREAM 1 APPLICATION(S): 100000 CREAM TOPICAL at 05:37

## 2020-03-21 RX ADMIN — Medication 0.12 MILLIGRAM(S): at 05:41

## 2020-03-21 RX ADMIN — Medication 6 UNIT(S): at 07:55

## 2020-03-21 RX ADMIN — INSULIN GLARGINE 20 UNIT(S): 100 INJECTION, SOLUTION SUBCUTANEOUS at 21:15

## 2020-03-21 RX ADMIN — PRASUGREL 10 MILLIGRAM(S): 5 TABLET, FILM COATED ORAL at 12:41

## 2020-03-21 RX ADMIN — HEPARIN SODIUM 5000 UNIT(S): 5000 INJECTION INTRAVENOUS; SUBCUTANEOUS at 05:37

## 2020-03-21 RX ADMIN — HEPARIN SODIUM 5000 UNIT(S): 5000 INJECTION INTRAVENOUS; SUBCUTANEOUS at 21:11

## 2020-03-22 LAB
GLUCOSE BLDC GLUCOMTR-MCNC: 122 MG/DL — HIGH (ref 70–99)
GLUCOSE BLDC GLUCOMTR-MCNC: 173 MG/DL — HIGH (ref 70–99)
GLUCOSE BLDC GLUCOMTR-MCNC: 178 MG/DL — HIGH (ref 70–99)
GLUCOSE BLDC GLUCOMTR-MCNC: 223 MG/DL — HIGH (ref 70–99)

## 2020-03-22 RX ADMIN — Medication 2: at 12:28

## 2020-03-22 RX ADMIN — CHLORHEXIDINE GLUCONATE 1 APPLICATION(S): 213 SOLUTION TOPICAL at 05:33

## 2020-03-22 RX ADMIN — Medication 650 MILLIGRAM(S): at 21:18

## 2020-03-22 RX ADMIN — HEPARIN SODIUM 5000 UNIT(S): 5000 INJECTION INTRAVENOUS; SUBCUTANEOUS at 05:10

## 2020-03-22 RX ADMIN — Medication 6 UNIT(S): at 12:28

## 2020-03-22 RX ADMIN — Medication 81 MILLIGRAM(S): at 11:36

## 2020-03-22 RX ADMIN — NYSTATIN CREAM 1 APPLICATION(S): 100000 CREAM TOPICAL at 17:18

## 2020-03-22 RX ADMIN — DULOXETINE HYDROCHLORIDE 90 MILLIGRAM(S): 30 CAPSULE, DELAYED RELEASE ORAL at 11:36

## 2020-03-22 RX ADMIN — HEPARIN SODIUM 5000 UNIT(S): 5000 INJECTION INTRAVENOUS; SUBCUTANEOUS at 13:32

## 2020-03-22 RX ADMIN — INSULIN GLARGINE 20 UNIT(S): 100 INJECTION, SOLUTION SUBCUTANEOUS at 21:27

## 2020-03-22 RX ADMIN — Medication 25 MILLIGRAM(S): at 05:11

## 2020-03-22 RX ADMIN — HEPARIN SODIUM 5000 UNIT(S): 5000 INJECTION INTRAVENOUS; SUBCUTANEOUS at 21:13

## 2020-03-22 RX ADMIN — PRASUGREL 10 MILLIGRAM(S): 5 TABLET, FILM COATED ORAL at 11:36

## 2020-03-22 RX ADMIN — Medication 6 UNIT(S): at 17:21

## 2020-03-22 RX ADMIN — Medication 0.12 MILLIGRAM(S): at 05:10

## 2020-03-22 RX ADMIN — NYSTATIN CREAM 1 APPLICATION(S): 100000 CREAM TOPICAL at 05:11

## 2020-03-22 RX ADMIN — TAMSULOSIN HYDROCHLORIDE 0.4 MILLIGRAM(S): 0.4 CAPSULE ORAL at 21:14

## 2020-03-22 RX ADMIN — PANTOPRAZOLE SODIUM 40 MILLIGRAM(S): 20 TABLET, DELAYED RELEASE ORAL at 21:12

## 2020-03-22 RX ADMIN — Medication 4: at 17:20

## 2020-03-22 RX ADMIN — ATORVASTATIN CALCIUM 40 MILLIGRAM(S): 80 TABLET, FILM COATED ORAL at 21:12

## 2020-03-22 RX ADMIN — Medication 6 UNIT(S): at 08:14

## 2020-03-23 LAB
ALBUMIN SERPL ELPH-MCNC: 3.3 G/DL — LOW (ref 3.5–5.2)
ALP SERPL-CCNC: 162 U/L — HIGH (ref 30–115)
ALT FLD-CCNC: 33 U/L — SIGNIFICANT CHANGE UP (ref 0–41)
ANION GAP SERPL CALC-SCNC: 11 MMOL/L — SIGNIFICANT CHANGE UP (ref 7–14)
AST SERPL-CCNC: 22 U/L — SIGNIFICANT CHANGE UP (ref 0–41)
BASOPHILS # BLD AUTO: 0.04 K/UL — SIGNIFICANT CHANGE UP (ref 0–0.2)
BASOPHILS NFR BLD AUTO: 0.4 % — SIGNIFICANT CHANGE UP (ref 0–1)
BILIRUB SERPL-MCNC: 1.2 MG/DL — SIGNIFICANT CHANGE UP (ref 0.2–1.2)
BUN SERPL-MCNC: 23 MG/DL — HIGH (ref 10–20)
CALCIUM SERPL-MCNC: 9.1 MG/DL — SIGNIFICANT CHANGE UP (ref 8.5–10.1)
CHLORIDE SERPL-SCNC: 100 MMOL/L — SIGNIFICANT CHANGE UP (ref 98–110)
CO2 SERPL-SCNC: 28 MMOL/L — SIGNIFICANT CHANGE UP (ref 17–32)
CREAT SERPL-MCNC: 0.7 MG/DL — SIGNIFICANT CHANGE UP (ref 0.7–1.5)
EOSINOPHIL # BLD AUTO: 0.26 K/UL — SIGNIFICANT CHANGE UP (ref 0–0.7)
EOSINOPHIL NFR BLD AUTO: 2.6 % — SIGNIFICANT CHANGE UP (ref 0–8)
GLUCOSE BLDC GLUCOMTR-MCNC: 123 MG/DL — HIGH (ref 70–99)
GLUCOSE BLDC GLUCOMTR-MCNC: 127 MG/DL — HIGH (ref 70–99)
GLUCOSE BLDC GLUCOMTR-MCNC: 141 MG/DL — HIGH (ref 70–99)
GLUCOSE BLDC GLUCOMTR-MCNC: 211 MG/DL — HIGH (ref 70–99)
GLUCOSE SERPL-MCNC: 156 MG/DL — HIGH (ref 70–99)
HCT VFR BLD CALC: 36.6 % — LOW (ref 42–52)
HGB BLD-MCNC: 11.6 G/DL — LOW (ref 14–18)
IMM GRANULOCYTES NFR BLD AUTO: 0.4 % — HIGH (ref 0.1–0.3)
LYMPHOCYTES # BLD AUTO: 1.11 K/UL — LOW (ref 1.2–3.4)
LYMPHOCYTES # BLD AUTO: 11 % — LOW (ref 20.5–51.1)
MAGNESIUM SERPL-MCNC: 1.8 MG/DL — SIGNIFICANT CHANGE UP (ref 1.8–2.4)
MCHC RBC-ENTMCNC: 24.7 PG — LOW (ref 27–31)
MCHC RBC-ENTMCNC: 31.7 G/DL — LOW (ref 32–37)
MCV RBC AUTO: 78 FL — LOW (ref 80–94)
MONOCYTES # BLD AUTO: 1.36 K/UL — HIGH (ref 0.1–0.6)
MONOCYTES NFR BLD AUTO: 13.5 % — HIGH (ref 1.7–9.3)
NEUTROPHILS # BLD AUTO: 7.26 K/UL — HIGH (ref 1.4–6.5)
NEUTROPHILS NFR BLD AUTO: 72.1 % — SIGNIFICANT CHANGE UP (ref 42.2–75.2)
NRBC # BLD: 0 /100 WBCS — SIGNIFICANT CHANGE UP (ref 0–0)
PLATELET # BLD AUTO: 241 K/UL — SIGNIFICANT CHANGE UP (ref 130–400)
POTASSIUM SERPL-MCNC: 4.5 MMOL/L — SIGNIFICANT CHANGE UP (ref 3.5–5)
POTASSIUM SERPL-SCNC: 4.5 MMOL/L — SIGNIFICANT CHANGE UP (ref 3.5–5)
PROT SERPL-MCNC: 6.1 G/DL — SIGNIFICANT CHANGE UP (ref 6–8)
RBC # BLD: 4.69 M/UL — LOW (ref 4.7–6.1)
RBC # FLD: 27.4 % — HIGH (ref 11.5–14.5)
SODIUM SERPL-SCNC: 139 MMOL/L — SIGNIFICANT CHANGE UP (ref 135–146)
WBC # BLD: 10.07 K/UL — SIGNIFICANT CHANGE UP (ref 4.8–10.8)
WBC # FLD AUTO: 10.07 K/UL — SIGNIFICANT CHANGE UP (ref 4.8–10.8)

## 2020-03-23 PROCEDURE — 74018 RADEX ABDOMEN 1 VIEW: CPT | Mod: 26

## 2020-03-23 RX ADMIN — PANTOPRAZOLE SODIUM 40 MILLIGRAM(S): 20 TABLET, DELAYED RELEASE ORAL at 21:28

## 2020-03-23 RX ADMIN — PRASUGREL 10 MILLIGRAM(S): 5 TABLET, FILM COATED ORAL at 11:46

## 2020-03-23 RX ADMIN — Medication 0.12 MILLIGRAM(S): at 05:43

## 2020-03-23 RX ADMIN — Medication 6 UNIT(S): at 07:41

## 2020-03-23 RX ADMIN — NYSTATIN CREAM 1 APPLICATION(S): 100000 CREAM TOPICAL at 20:03

## 2020-03-23 RX ADMIN — INSULIN GLARGINE 20 UNIT(S): 100 INJECTION, SOLUTION SUBCUTANEOUS at 21:28

## 2020-03-23 RX ADMIN — CHLORHEXIDINE GLUCONATE 1 APPLICATION(S): 213 SOLUTION TOPICAL at 05:43

## 2020-03-23 RX ADMIN — DULOXETINE HYDROCHLORIDE 90 MILLIGRAM(S): 30 CAPSULE, DELAYED RELEASE ORAL at 11:46

## 2020-03-23 RX ADMIN — Medication 650 MILLIGRAM(S): at 15:46

## 2020-03-23 RX ADMIN — ATORVASTATIN CALCIUM 40 MILLIGRAM(S): 80 TABLET, FILM COATED ORAL at 21:28

## 2020-03-23 RX ADMIN — Medication 6 UNIT(S): at 16:14

## 2020-03-23 RX ADMIN — Medication 1 APPLICATION(S): at 05:42

## 2020-03-23 RX ADMIN — HEPARIN SODIUM 5000 UNIT(S): 5000 INJECTION INTRAVENOUS; SUBCUTANEOUS at 21:29

## 2020-03-23 RX ADMIN — Medication 81 MILLIGRAM(S): at 11:45

## 2020-03-23 RX ADMIN — HEPARIN SODIUM 5000 UNIT(S): 5000 INJECTION INTRAVENOUS; SUBCUTANEOUS at 14:16

## 2020-03-23 RX ADMIN — Medication 1 APPLICATION(S): at 05:43

## 2020-03-23 RX ADMIN — NYSTATIN CREAM 1 APPLICATION(S): 100000 CREAM TOPICAL at 05:42

## 2020-03-23 RX ADMIN — Medication 25 MILLIGRAM(S): at 05:43

## 2020-03-23 RX ADMIN — HEPARIN SODIUM 5000 UNIT(S): 5000 INJECTION INTRAVENOUS; SUBCUTANEOUS at 05:42

## 2020-03-23 RX ADMIN — TAMSULOSIN HYDROCHLORIDE 0.4 MILLIGRAM(S): 0.4 CAPSULE ORAL at 21:28

## 2020-03-23 RX ADMIN — Medication 4: at 16:14

## 2020-03-23 RX ADMIN — Medication 650 MILLIGRAM(S): at 09:58

## 2020-03-23 RX ADMIN — Medication 6 UNIT(S): at 11:41

## 2020-03-24 LAB
GLUCOSE BLDC GLUCOMTR-MCNC: 135 MG/DL — HIGH (ref 70–99)
GLUCOSE BLDC GLUCOMTR-MCNC: 149 MG/DL — HIGH (ref 70–99)
GLUCOSE BLDC GLUCOMTR-MCNC: 179 MG/DL — HIGH (ref 70–99)
GLUCOSE BLDC GLUCOMTR-MCNC: 208 MG/DL — HIGH (ref 70–99)

## 2020-03-24 RX ADMIN — PRASUGREL 10 MILLIGRAM(S): 5 TABLET, FILM COATED ORAL at 11:45

## 2020-03-24 RX ADMIN — Medication 4: at 11:44

## 2020-03-24 RX ADMIN — ATORVASTATIN CALCIUM 40 MILLIGRAM(S): 80 TABLET, FILM COATED ORAL at 21:28

## 2020-03-24 RX ADMIN — Medication 2: at 16:32

## 2020-03-24 RX ADMIN — NYSTATIN CREAM 1 APPLICATION(S): 100000 CREAM TOPICAL at 19:46

## 2020-03-24 RX ADMIN — Medication 25 MILLIGRAM(S): at 05:42

## 2020-03-24 RX ADMIN — HEPARIN SODIUM 5000 UNIT(S): 5000 INJECTION INTRAVENOUS; SUBCUTANEOUS at 05:41

## 2020-03-24 RX ADMIN — Medication 6 UNIT(S): at 07:50

## 2020-03-24 RX ADMIN — HEPARIN SODIUM 5000 UNIT(S): 5000 INJECTION INTRAVENOUS; SUBCUTANEOUS at 15:33

## 2020-03-24 RX ADMIN — DULOXETINE HYDROCHLORIDE 90 MILLIGRAM(S): 30 CAPSULE, DELAYED RELEASE ORAL at 11:45

## 2020-03-24 RX ADMIN — Medication 300 MILLIGRAM(S): at 18:29

## 2020-03-24 RX ADMIN — Medication 6 UNIT(S): at 16:32

## 2020-03-24 RX ADMIN — INSULIN GLARGINE 20 UNIT(S): 100 INJECTION, SOLUTION SUBCUTANEOUS at 21:33

## 2020-03-24 RX ADMIN — Medication 300 MILLIGRAM(S): at 23:29

## 2020-03-24 RX ADMIN — Medication 6 UNIT(S): at 11:43

## 2020-03-24 RX ADMIN — NYSTATIN CREAM 1 APPLICATION(S): 100000 CREAM TOPICAL at 05:41

## 2020-03-24 RX ADMIN — Medication 81 MILLIGRAM(S): at 11:45

## 2020-03-24 RX ADMIN — TAMSULOSIN HYDROCHLORIDE 0.4 MILLIGRAM(S): 0.4 CAPSULE ORAL at 21:28

## 2020-03-24 RX ADMIN — PANTOPRAZOLE SODIUM 40 MILLIGRAM(S): 20 TABLET, DELAYED RELEASE ORAL at 21:28

## 2020-03-24 RX ADMIN — Medication 0.12 MILLIGRAM(S): at 05:42

## 2020-03-24 RX ADMIN — HEPARIN SODIUM 5000 UNIT(S): 5000 INJECTION INTRAVENOUS; SUBCUTANEOUS at 21:28

## 2020-03-25 LAB
GLUCOSE BLDC GLUCOMTR-MCNC: 115 MG/DL — HIGH (ref 70–99)
GLUCOSE BLDC GLUCOMTR-MCNC: 139 MG/DL — HIGH (ref 70–99)
GLUCOSE BLDC GLUCOMTR-MCNC: 226 MG/DL — HIGH (ref 70–99)

## 2020-03-25 RX ADMIN — Medication 0.12 MILLIGRAM(S): at 05:30

## 2020-03-25 RX ADMIN — HEPARIN SODIUM 5000 UNIT(S): 5000 INJECTION INTRAVENOUS; SUBCUTANEOUS at 21:27

## 2020-03-25 RX ADMIN — HEPARIN SODIUM 5000 UNIT(S): 5000 INJECTION INTRAVENOUS; SUBCUTANEOUS at 13:03

## 2020-03-25 RX ADMIN — HEPARIN SODIUM 5000 UNIT(S): 5000 INJECTION INTRAVENOUS; SUBCUTANEOUS at 05:31

## 2020-03-25 RX ADMIN — Medication 25 MILLIGRAM(S): at 05:30

## 2020-03-25 RX ADMIN — Medication 6 UNIT(S): at 17:33

## 2020-03-25 RX ADMIN — Medication 300 MILLIGRAM(S): at 11:37

## 2020-03-25 RX ADMIN — Medication 1 APPLICATION(S): at 05:30

## 2020-03-25 RX ADMIN — CHLORHEXIDINE GLUCONATE 1 APPLICATION(S): 213 SOLUTION TOPICAL at 05:30

## 2020-03-25 RX ADMIN — INSULIN GLARGINE 20 UNIT(S): 100 INJECTION, SOLUTION SUBCUTANEOUS at 21:27

## 2020-03-25 RX ADMIN — NYSTATIN CREAM 1 APPLICATION(S): 100000 CREAM TOPICAL at 05:31

## 2020-03-25 RX ADMIN — PANTOPRAZOLE SODIUM 40 MILLIGRAM(S): 20 TABLET, DELAYED RELEASE ORAL at 21:26

## 2020-03-25 RX ADMIN — Medication 6 UNIT(S): at 08:04

## 2020-03-25 RX ADMIN — Medication 1 APPLICATION(S): at 05:33

## 2020-03-25 RX ADMIN — Medication 300 MILLIGRAM(S): at 05:33

## 2020-03-25 RX ADMIN — Medication 81 MILLIGRAM(S): at 11:39

## 2020-03-25 RX ADMIN — DULOXETINE HYDROCHLORIDE 90 MILLIGRAM(S): 30 CAPSULE, DELAYED RELEASE ORAL at 11:37

## 2020-03-25 RX ADMIN — ATORVASTATIN CALCIUM 40 MILLIGRAM(S): 80 TABLET, FILM COATED ORAL at 21:27

## 2020-03-25 RX ADMIN — Medication 4: at 08:03

## 2020-03-25 RX ADMIN — NYSTATIN CREAM 1 APPLICATION(S): 100000 CREAM TOPICAL at 17:31

## 2020-03-25 RX ADMIN — Medication 6 UNIT(S): at 11:34

## 2020-03-25 RX ADMIN — PRASUGREL 10 MILLIGRAM(S): 5 TABLET, FILM COATED ORAL at 11:39

## 2020-03-25 NOTE — CDI QUERY NOTE - NSCDIOTHERTXTBX_GEN_ALL_CORE_HH
Query:  1.	ED documents, “recent right hip fracture with recent fields catheter” and “UA was obtained directly from the catheter, not from Fields bag” with the Fields exchanged in the ED.  Clinical evidence of a cause-and-effect relationship is present without a documented link.   Can you please clarify if there is an association with the UTI and the Fields catheter or not and was this present on admission?       Evidence:  ED Provide Note (3/21): Tommy Cameron)  (Signed 25-Feb-2020 16:04)  HPI Objective Statement: 65 yo male hx of CHF (EF 11%)/ HTN?HLD/DM/ recent right hip fracture with recent fields catheter, right thigh with skin graft BIBA from NH 2/2 AMS    CURRENT ORDERS/:   · 	Indwelling Urethral Catheter,   Connect To:  Straight Drainage/[place]  	  Indication:  Traumatic Injury requiring immobilization  	  Additional Instructions:  dc current catheter and switch to new one, 20-Feb-2020, Active, Standard    PROGRESS NOTE:   Date: 21-Feb-2020 00:41.   Progress: Patient refused to have Fields catheter to be changed. UA was obtained directly from the catheter, not from Fields bag.    Summarize the clinical encounter:  64 M p/w ams from NH patient was reported to be agitated and combative with staff. Pt agitated here, can be calmed down. vs wnl. Will need to r/o organic causes of pt's agitation. CTH neg for ich, midline shift, or hydrocephalus. review of labs noted to have +UA- LE. Will treat with ceftriaxone. admit to medicine for ams    Progress Note Adult-Infectious Disease Attending (2/22):  Sybil Arreola)  (Signed 22-Feb-2020 12:43)  •	#Asymptomatic bacteriuria-   o	UCX GNR  UA 81 WBC  •	#Sepsis ruled out on admission   Rx: CTX x1 (2/21) then CTX (2/25 – 2-28)    Progress Note Adult-Internal Medicine Resident/Attending (2/24): Mak Cole ()  (Signed 24-Feb-2020 14:16)  •	Attending Attestation:   o	#metabolic encephalopathy 2/2 hyponatremia with superimposed UTI  ?	mental status significantly improved today  ?	-cont to monitor off antibiotics at this time- Dysphagia 3 diet w/ thin liquids & 1:1 feeds (based on previous S&S eval)     Discharge Note (3/4): Mihir Anderson)   (Signed 23-Mar-2020 11:31)  •	……… was admitted for metabolic encephalopathy/ found to have klebsiella pneumoniae UTI (pansensitive).

## 2020-03-26 LAB
GLUCOSE BLDC GLUCOMTR-MCNC: 125 MG/DL — HIGH (ref 70–99)
GLUCOSE BLDC GLUCOMTR-MCNC: 132 MG/DL — HIGH (ref 70–99)
GLUCOSE BLDC GLUCOMTR-MCNC: 206 MG/DL — HIGH (ref 70–99)
GLUCOSE BLDC GLUCOMTR-MCNC: 96 MG/DL — SIGNIFICANT CHANGE UP (ref 70–99)

## 2020-03-26 RX ADMIN — ATORVASTATIN CALCIUM 40 MILLIGRAM(S): 80 TABLET, FILM COATED ORAL at 21:28

## 2020-03-26 RX ADMIN — Medication 25 MILLIGRAM(S): at 05:48

## 2020-03-26 RX ADMIN — CHLORHEXIDINE GLUCONATE 1 APPLICATION(S): 213 SOLUTION TOPICAL at 05:49

## 2020-03-26 RX ADMIN — NYSTATIN CREAM 1 APPLICATION(S): 100000 CREAM TOPICAL at 05:48

## 2020-03-26 RX ADMIN — DULOXETINE HYDROCHLORIDE 90 MILLIGRAM(S): 30 CAPSULE, DELAYED RELEASE ORAL at 12:15

## 2020-03-26 RX ADMIN — HEPARIN SODIUM 5000 UNIT(S): 5000 INJECTION INTRAVENOUS; SUBCUTANEOUS at 13:40

## 2020-03-26 RX ADMIN — PANTOPRAZOLE SODIUM 40 MILLIGRAM(S): 20 TABLET, DELAYED RELEASE ORAL at 21:28

## 2020-03-26 RX ADMIN — PRASUGREL 10 MILLIGRAM(S): 5 TABLET, FILM COATED ORAL at 12:15

## 2020-03-26 RX ADMIN — NYSTATIN CREAM 1 APPLICATION(S): 100000 CREAM TOPICAL at 18:09

## 2020-03-26 RX ADMIN — Medication 6 UNIT(S): at 12:13

## 2020-03-26 RX ADMIN — Medication 4: at 17:02

## 2020-03-26 RX ADMIN — INSULIN GLARGINE 20 UNIT(S): 100 INJECTION, SOLUTION SUBCUTANEOUS at 21:29

## 2020-03-26 RX ADMIN — Medication 1 APPLICATION(S): at 05:55

## 2020-03-26 RX ADMIN — HEPARIN SODIUM 5000 UNIT(S): 5000 INJECTION INTRAVENOUS; SUBCUTANEOUS at 21:28

## 2020-03-26 RX ADMIN — HEPARIN SODIUM 5000 UNIT(S): 5000 INJECTION INTRAVENOUS; SUBCUTANEOUS at 05:49

## 2020-03-26 RX ADMIN — Medication 6 UNIT(S): at 08:05

## 2020-03-26 RX ADMIN — Medication 0.12 MILLIGRAM(S): at 05:48

## 2020-03-26 RX ADMIN — TAMSULOSIN HYDROCHLORIDE 0.4 MILLIGRAM(S): 0.4 CAPSULE ORAL at 00:52

## 2020-03-26 RX ADMIN — Medication 81 MILLIGRAM(S): at 12:14

## 2020-03-26 RX ADMIN — Medication 6 UNIT(S): at 17:03

## 2020-03-26 RX ADMIN — TAMSULOSIN HYDROCHLORIDE 0.4 MILLIGRAM(S): 0.4 CAPSULE ORAL at 21:32

## 2020-03-27 LAB
GLUCOSE BLDC GLUCOMTR-MCNC: 101 MG/DL — HIGH (ref 70–99)
GLUCOSE BLDC GLUCOMTR-MCNC: 157 MG/DL — HIGH (ref 70–99)
GLUCOSE BLDC GLUCOMTR-MCNC: 174 MG/DL — HIGH (ref 70–99)
GLUCOSE BLDC GLUCOMTR-MCNC: 175 MG/DL — HIGH (ref 70–99)

## 2020-03-27 RX ADMIN — HEPARIN SODIUM 5000 UNIT(S): 5000 INJECTION INTRAVENOUS; SUBCUTANEOUS at 13:25

## 2020-03-27 RX ADMIN — PANTOPRAZOLE SODIUM 40 MILLIGRAM(S): 20 TABLET, DELAYED RELEASE ORAL at 21:35

## 2020-03-27 RX ADMIN — INSULIN GLARGINE 20 UNIT(S): 100 INJECTION, SOLUTION SUBCUTANEOUS at 21:36

## 2020-03-27 RX ADMIN — Medication 2: at 12:31

## 2020-03-27 RX ADMIN — Medication 6 UNIT(S): at 17:30

## 2020-03-27 RX ADMIN — HEPARIN SODIUM 5000 UNIT(S): 5000 INJECTION INTRAVENOUS; SUBCUTANEOUS at 05:47

## 2020-03-27 RX ADMIN — Medication 6 UNIT(S): at 12:31

## 2020-03-27 RX ADMIN — HEPARIN SODIUM 5000 UNIT(S): 5000 INJECTION INTRAVENOUS; SUBCUTANEOUS at 21:36

## 2020-03-27 RX ADMIN — Medication 1 APPLICATION(S): at 05:46

## 2020-03-27 RX ADMIN — DULOXETINE HYDROCHLORIDE 90 MILLIGRAM(S): 30 CAPSULE, DELAYED RELEASE ORAL at 12:32

## 2020-03-27 RX ADMIN — TAMSULOSIN HYDROCHLORIDE 0.4 MILLIGRAM(S): 0.4 CAPSULE ORAL at 21:37

## 2020-03-27 RX ADMIN — Medication 2: at 08:15

## 2020-03-27 RX ADMIN — NYSTATIN CREAM 1 APPLICATION(S): 100000 CREAM TOPICAL at 05:48

## 2020-03-27 RX ADMIN — NYSTATIN CREAM 1 APPLICATION(S): 100000 CREAM TOPICAL at 17:31

## 2020-03-27 RX ADMIN — Medication 1 APPLICATION(S): at 06:03

## 2020-03-27 RX ADMIN — Medication 6 UNIT(S): at 08:15

## 2020-03-27 RX ADMIN — Medication 25 MILLIGRAM(S): at 05:47

## 2020-03-27 RX ADMIN — Medication 2: at 17:29

## 2020-03-27 RX ADMIN — Medication 81 MILLIGRAM(S): at 12:32

## 2020-03-27 RX ADMIN — CHLORHEXIDINE GLUCONATE 1 APPLICATION(S): 213 SOLUTION TOPICAL at 05:46

## 2020-03-27 RX ADMIN — ATORVASTATIN CALCIUM 40 MILLIGRAM(S): 80 TABLET, FILM COATED ORAL at 21:35

## 2020-03-27 RX ADMIN — Medication 0.12 MILLIGRAM(S): at 05:47

## 2020-03-27 RX ADMIN — PRASUGREL 10 MILLIGRAM(S): 5 TABLET, FILM COATED ORAL at 12:33

## 2020-03-28 ENCOUNTER — TRANSCRIPTION ENCOUNTER (OUTPATIENT)
Age: 65
End: 2020-03-28

## 2020-03-28 LAB
CULTURE RESULTS: SIGNIFICANT CHANGE UP
GLUCOSE BLDC GLUCOMTR-MCNC: 170 MG/DL — HIGH (ref 70–99)
GLUCOSE BLDC GLUCOMTR-MCNC: 190 MG/DL — HIGH (ref 70–99)
GLUCOSE BLDC GLUCOMTR-MCNC: 240 MG/DL — HIGH (ref 70–99)
SPECIMEN SOURCE: SIGNIFICANT CHANGE UP

## 2020-03-28 RX ORDER — ACETAMINOPHEN 500 MG
2 TABLET ORAL
Qty: 0 | Refills: 0 | DISCHARGE
Start: 2020-03-28

## 2020-03-28 RX ORDER — BACITRACIN ZINC 500 UNIT/G
1 OINTMENT IN PACKET (EA) TOPICAL
Qty: 0 | Refills: 0 | DISCHARGE
Start: 2020-03-28

## 2020-03-28 RX ORDER — PANTOPRAZOLE SODIUM 20 MG/1
1 TABLET, DELAYED RELEASE ORAL
Qty: 0 | Refills: 0 | DISCHARGE

## 2020-03-28 RX ORDER — COLLAGENASE CLOSTRIDIUM HIST. 250 UNIT/G
1 OINTMENT (GRAM) TOPICAL
Qty: 30 | Refills: 1
Start: 2020-03-28

## 2020-03-28 RX ORDER — METOPROLOL TARTRATE 50 MG
1 TABLET ORAL
Qty: 30 | Refills: 0
Start: 2020-03-28 | End: 2020-04-26

## 2020-03-28 RX ORDER — NYSTATIN CREAM 100000 [USP'U]/G
1 CREAM TOPICAL
Qty: 400 | Refills: 1
Start: 2020-03-28

## 2020-03-28 RX ORDER — TAMSULOSIN HYDROCHLORIDE 0.4 MG/1
1 CAPSULE ORAL
Qty: 30 | Refills: 0
Start: 2020-03-28 | End: 2020-04-26

## 2020-03-28 RX ADMIN — Medication 6 UNIT(S): at 11:44

## 2020-03-28 RX ADMIN — Medication 2: at 17:06

## 2020-03-28 RX ADMIN — Medication 25 MILLIGRAM(S): at 05:33

## 2020-03-28 RX ADMIN — Medication 81 MILLIGRAM(S): at 11:46

## 2020-03-28 RX ADMIN — Medication 2: at 08:05

## 2020-03-28 RX ADMIN — HEPARIN SODIUM 5000 UNIT(S): 5000 INJECTION INTRAVENOUS; SUBCUTANEOUS at 05:33

## 2020-03-28 RX ADMIN — Medication 6 UNIT(S): at 08:05

## 2020-03-28 RX ADMIN — HEPARIN SODIUM 5000 UNIT(S): 5000 INJECTION INTRAVENOUS; SUBCUTANEOUS at 14:47

## 2020-03-28 RX ADMIN — DULOXETINE HYDROCHLORIDE 90 MILLIGRAM(S): 30 CAPSULE, DELAYED RELEASE ORAL at 11:46

## 2020-03-28 RX ADMIN — CHLORHEXIDINE GLUCONATE 1 APPLICATION(S): 213 SOLUTION TOPICAL at 05:36

## 2020-03-28 RX ADMIN — Medication 6 UNIT(S): at 17:06

## 2020-03-28 RX ADMIN — Medication 1 APPLICATION(S): at 05:32

## 2020-03-28 RX ADMIN — NYSTATIN CREAM 1 APPLICATION(S): 100000 CREAM TOPICAL at 05:35

## 2020-03-28 RX ADMIN — PRASUGREL 10 MILLIGRAM(S): 5 TABLET, FILM COATED ORAL at 11:46

## 2020-03-28 RX ADMIN — Medication 1 APPLICATION(S): at 05:36

## 2020-03-28 RX ADMIN — Medication 4: at 11:44

## 2020-03-28 RX ADMIN — Medication 0.12 MILLIGRAM(S): at 05:33

## 2020-03-28 NOTE — DISCHARGE NOTE PROVIDER - NSDCMRMEDTOKEN_GEN_ALL_CORE_FT
acetaminophen 325 mg oral tablet: 2 tab(s) orally every 6 hours, As Needed - for pain or fever  aspirin 81 mg oral delayed release tablet: 1 tab(s) orally once a day  atorvastatin 40 mg oral tablet: 1 tab(s) orally once a day (at bedtime)  bacitracin 500 units/g topical ointment: 1 application topically daily to right ankle ulcer and AICD scab, cover with Dry Sterile dressing  collagenase 250 units/g topical ointment: Apply topically to medial left ankle ulcer once a day, cover with dry sterile dressing  digoxin 125 mcg (0.125 mg) oral tablet: 1 tab(s) orally once a day  DULoxetine 30 mg oral delayed release capsule: 3 cap(s) orally once a day  metoprolol succinate 25 mg oral tablet, extended release: 1 tab(s) orally once a day  nystatin 100,000 units/g topical powder: 1 application topically every 12 hours to groinl area; apply powder after gently washing and patting dry affected areas  pantoprazole 40 mg oral delayed release tablet: 1 tab(s) orally once a day, take 30 minutes before breakfast  prasugrel 10 mg oral tablet: 1 tab(s) orally once a day  tamsulosin 0.4 mg oral capsule: 1 cap(s) orally once a day (at bedtime)

## 2020-03-28 NOTE — DISCHARGE NOTE PROVIDER - INSTRUCTIONS
Gluten-free, diabetic, heart healthy Gluten-free, diabetic, heart healthy, eat a high protein diabetic snack about 2 hours before bedtime, eg 1/2 sandwich or fat-free plain yogurt with a little fresh fruit

## 2020-03-28 NOTE — DISCHARGE NOTE NURSING/CASE MANAGEMENT/SOCIAL WORK - PATIENT PORTAL LINK FT
You can access the FollowMyHealth Patient Portal offered by [facility] by registering at the following website: [email or web address]. By joining [employer]’s FollowMyHealth portal, you will also be able to view your health information using other applications (apps) compatible with our system.

## 2020-03-28 NOTE — DISCHARGE NOTE PROVIDER - CARE PROVIDER_API CALL
Roney Turk)  Internal Medicine  [address] [place] [address]  Phone: [phone]  Fax: [phone]  Established Patient  Follow Up Time: 1 week    Mihir Lopez)  Cardiovascular Disease; Internal Medicine; Interventional Cardiology  [facility] [facility], [place] [address]  Phone: [phone]  Fax: [phone]  Follow Up Time: 1 week    Nazia Garibay)  Surgical Physicians  [address] [place] [address], [address]rd [place] [address]  Phone: [phone]  Fax: [phone]  Follow Up Time: 1-3 days    Mani Anguiano; AURELIANO)  Cardiac Electrophysiology  [address] [facility], [facility] [address]  [place] [address]  Phone: [phone]  Fax: [phone]  Follow Up Time: 2 weeks    Roney Fernandes)  Surgery; Thoracic and Cardiac Surgery  [address] [place] [address], [address]  [place] [address]  Phone: [phone]  Fax: [phone]  Follow Up Time: 2 weeks

## 2020-03-28 NOTE — DISCHARGE NOTE PROVIDER - HOSPITAL COURSE
HPI: Mr. Ibarra is a 64 year-old man with PMH of HTN, DLD, CAD s/p CABG, HRrEF (EFof 25-30% 02/2020) s/p [facility] [place] BiV placement, DM (on insulin pump), pulmonary hypertension, Celiac disease, diverticulitis, and history of right total knee replacement (10/2019) complicated by infection s/p abx course w/ PICC, right hip fracture s/p ORIF (02/2020) who was admitted for metabolic encephalopathy and noted to have klebsiella pneumoniae UTI.         Patient was admitted to medicine and treated with IV Abx. His hospital course was complicated by elevated LFTs and severe sepsis. HIDA scan was obtained and demonstrated cholelithiasis. GI and IR consulted and patient underwent drain placement by IR on 2/28/20. Also during his hospital course patient found to have elevated troponin due to MOISES. His BiV AICD was noted to be malfunctioning and device was reprogrammed on 3/16.         Also during his hospitalization patient was found left second toe ulcer. Xray of the left foot revealed OM. Podiatry and ID were consulted and recommended medical management/ outpatient follow-up. On March 19th patient was deemed medically stable for discharge to acute inpatient rehabiliation.         Hospital Course: The patient was admitted to the acute inpatient rehab unit presenting with a decline in functional status. The patient participated in three hours of multidisciplinary therapy 5-6 days per week. The patient was continued on all home medications or equivalent alternatives as deemed appropriate. The patient received prophylactic anticoagulation medication and was monitored closely with no complications. During the stay on the inpatient unit, the patient showed as much progress as had been anticipated and was cleared for discharge by a multidisciplinary team.        PLOF: Dependent with ADLs. Dependent for ambulation with walker.     Living Situation: most recently patient was in NH for rehab after right hip fx.         Hospital Course: The patient was admitted to the acute inpatient rehab unit presenting with a decline in functional status. The patient participated in three hours of multidisciplinary therapy 5-6 days per week. The patient was continued on all home medications or equivalent alternatives as deemed appropriate. The patient received prophylactic anticoagulation medication and was monitored closely with no complications. While on the rehab unit cardiology was consulted for hypotension. Lasix has been held since beginning of admission. Metoprolol XL was decreased from 50 mg daily to 25 mg daily.    Midodrine 10mg po 3 times a day was also added for SBP < 100 when sitting. He improved greatly and no longer needs the Midodrine.    The patient was also noted to have left chest infection at site of AICD placement and patient completed a course of Clinda.         ADMISSION PHYSICAL EXAM     GEN: Awake, alert, NAD    HEENT: atraumatic, EOMI.    Chest: CTAB, healing AICD incision left upper chest with scab    CVS: RRR, SI/S2+, no murmur.    Abdo: Soft, NT, ND, RUQ drain+    CNS: non focal.    Ext: no edema feet. left foot ulcer+ (2nd toe)        DISCHARGE FUNCTIONAL STATUS:     OT: needs moderate assistance with transfers, set-up for meals and footwear    PT: Ambulates 150 feet with rolling walker, WBAT RLE            DISCHARGE DISPOSITION:     Due to need for beds for covid + patients, the patient was discharged home with home care on 3/28/2020. He will follow up with IR and Surgery next week, so that arrangement for removal of his cholecystostomy tube and for lap sudhir can be made. He will also follow up with his PCP in one week, as well as with Cardiology Dr. Lopez, EP Dr. Anguiano and CTU Dr. Fernandes.

## 2020-03-28 NOTE — DISCHARGE NOTE PROVIDER - PROVIDER TOKENS
PROVIDER:[TOKEN:[[id number]],FOLLOWUP:[1 week],ESTABLISHEDPATIENT:[T]],PROVIDER:[TOKEN:[[id number]],FOLLOWUP:[1 week]],PROVIDER:[TOKEN:[[id number]],FOLLOWUP:[1-3 days]],PROVIDER:[TOKEN:[[id number]],FOLLOWUP:[2 weeks]],PROVIDER:[TOKEN:[[id number]],FOLLOWUP:[2 weeks]]

## 2020-03-28 NOTE — DISCHARGE NOTE PROVIDER - NSDCCPCAREPLAN_GEN_ALL_CORE_FT
PRINCIPAL DISCHARGE DIAGNOSIS  Diagnosis: Hip fracture, right  Assessment and Plan of Treatment: You had surgical repair of your right hip fracture on 2/20/2020. Continue physical therapy at home. Follow up as needed with your Orthopedic surgeon.      SECONDARY DISCHARGE DIAGNOSES  Diagnosis: CAD (coronary artery disease)  Assessment and Plan of Treatment: Continue medications and diet as instructed. Follow up with Dr. Lopez in one to two weeks. Also please follow up with Dr. Anguiano, who re-programmed your AICD/PPM and with CTU DR. Fernandes, who repaired your AICD. Call 911 or go to the ER if you have chest pain, trouble breathing, nausea/sweating, dizziness/fainting, palpitations.    Diagnosis: H/O acute cholecystitis  Assessment and Plan of Treatment: IR will call you on Tuesday or Wednesday (call them if you don't hear from them), in order to arrange for removal of your cholecystostomy tube and for your lap cholecystectomy. Your surgeon is Dr. Garibay and your GI physician is Dr. Tello.    Diagnosis: Celiac disease  Assessment and Plan of Treatment: Follow gluten-free diet and see your doctors regularly.    Diagnosis: Type 2 diabetes mellitus  Assessment and Plan of Treatment: Continue insulin via pump and monitor blood sugar via Freestyle sensor, follow diabetic diet and see your doctors regularly.  ***Please follow up with your PODIATRIST for care of your feet, toes and ankles--continue daily wound care as instructed.***  ***Please follow up regularly with your OPHTHALMOLOGIST as well, for eye care***

## 2020-03-28 NOTE — DISCHARGE NOTE PROVIDER - CARE PROVIDERS DIRECT ADDRESSES
[email or web address] [email or web address]

## 2020-03-28 NOTE — DISCHARGE NOTE PROVIDER - NSDCFUADDINST_GEN_ALL_CORE_FT
***PLEASE SHOW THESE DISCHARGE PAPERS TO ALL YOUR DOCTORS AND CAREGIVERS***    ***PLEASE FOLLOW UP OFTEN WITH YOUR PODIATRIST AND OPHTHALMOLOGIST***    ***AMBULATE WITH THE ROLLING WALKER AND SUPERVISION FOR YOUR SAFETY; YOU ARE WEIGHT-BEARING AS TOLERATED ON YOUR RIGHT LOWER EXTREMITY***

## 2020-04-01 DIAGNOSIS — E78.5 HYPERLIPIDEMIA, UNSPECIFIED: ICD-10-CM

## 2020-04-01 DIAGNOSIS — N17.9 ACUTE KIDNEY FAILURE, UNSPECIFIED: ICD-10-CM

## 2020-04-01 DIAGNOSIS — R41.82 ALTERED MENTAL STATUS, UNSPECIFIED: ICD-10-CM

## 2020-04-01 DIAGNOSIS — Z88.3 ALLERGY STATUS TO OTHER ANTI-INFECTIVE AGENTS: ICD-10-CM

## 2020-04-01 DIAGNOSIS — I11.0 HYPERTENSIVE HEART DISEASE WITH HEART FAILURE: ICD-10-CM

## 2020-04-01 DIAGNOSIS — I25.2 OLD MYOCARDIAL INFARCTION: ICD-10-CM

## 2020-04-01 DIAGNOSIS — K82.1 HYDROPS OF GALLBLADDER: ICD-10-CM

## 2020-04-01 DIAGNOSIS — E11.40 TYPE 2 DIABETES MELLITUS WITH DIABETIC NEUROPATHY, UNSPECIFIED: ICD-10-CM

## 2020-04-01 DIAGNOSIS — F05 DELIRIUM DUE TO KNOWN PHYSIOLOGICAL CONDITION: ICD-10-CM

## 2020-04-01 DIAGNOSIS — R53.81 OTHER MALAISE: ICD-10-CM

## 2020-04-01 DIAGNOSIS — T82.518A BREAKDOWN (MECHANICAL) OF OTHER CARDIAC AND VASCULAR DEVICES AND IMPLANTS, INITIAL ENCOUNTER: ICD-10-CM

## 2020-04-01 DIAGNOSIS — F41.9 ANXIETY DISORDER, UNSPECIFIED: ICD-10-CM

## 2020-04-01 DIAGNOSIS — Z88.8 ALLERGY STATUS TO OTHER DRUGS, MEDICAMENTS AND BIOLOGICAL SUBSTANCES STATUS: ICD-10-CM

## 2020-04-01 DIAGNOSIS — A41.9 SEPSIS, UNSPECIFIED ORGANISM: ICD-10-CM

## 2020-04-01 DIAGNOSIS — R65.20 SEVERE SEPSIS WITHOUT SEPTIC SHOCK: ICD-10-CM

## 2020-04-01 DIAGNOSIS — E87.1 HYPO-OSMOLALITY AND HYPONATREMIA: ICD-10-CM

## 2020-04-01 DIAGNOSIS — Z79.82 LONG TERM (CURRENT) USE OF ASPIRIN: ICD-10-CM

## 2020-04-01 DIAGNOSIS — I25.5 ISCHEMIC CARDIOMYOPATHY: ICD-10-CM

## 2020-04-01 DIAGNOSIS — K80.00 CALCULUS OF GALLBLADDER WITH ACUTE CHOLECYSTITIS WITHOUT OBSTRUCTION: ICD-10-CM

## 2020-04-01 DIAGNOSIS — Z95.2 PRESENCE OF PROSTHETIC HEART VALVE: ICD-10-CM

## 2020-04-01 DIAGNOSIS — G93.41 METABOLIC ENCEPHALOPATHY: ICD-10-CM

## 2020-04-01 DIAGNOSIS — Z95.1 PRESENCE OF AORTOCORONARY BYPASS GRAFT: ICD-10-CM

## 2020-04-01 DIAGNOSIS — Z95.5 PRESENCE OF CORONARY ANGIOPLASTY IMPLANT AND GRAFT: ICD-10-CM

## 2020-04-01 DIAGNOSIS — E87.2 ACIDOSIS: ICD-10-CM

## 2020-04-01 DIAGNOSIS — I27.20 PULMONARY HYPERTENSION, UNSPECIFIED: ICD-10-CM

## 2020-04-01 DIAGNOSIS — I25.10 ATHEROSCLEROTIC HEART DISEASE OF NATIVE CORONARY ARTERY WITHOUT ANGINA PECTORIS: ICD-10-CM

## 2020-04-01 DIAGNOSIS — N39.0 URINARY TRACT INFECTION, SITE NOT SPECIFIED: ICD-10-CM

## 2020-04-01 DIAGNOSIS — T83.511A INFECTION AND INFLAMMATORY REACTION DUE TO INDWELLING URETHRAL CATHETER, INITIAL ENCOUNTER: ICD-10-CM

## 2020-04-01 DIAGNOSIS — E44.0 MODERATE PROTEIN-CALORIE MALNUTRITION: ICD-10-CM

## 2020-04-01 DIAGNOSIS — I50.22 CHRONIC SYSTOLIC (CONGESTIVE) HEART FAILURE: ICD-10-CM

## 2020-04-05 ENCOUNTER — RX RENEWAL (OUTPATIENT)
Age: 65
End: 2020-04-05

## 2020-04-07 DIAGNOSIS — Z95.810 PRESENCE OF AUTOMATIC (IMPLANTABLE) CARDIAC DEFIBRILLATOR: ICD-10-CM

## 2020-04-07 DIAGNOSIS — R53.81 OTHER MALAISE: ICD-10-CM

## 2020-04-07 DIAGNOSIS — Z96.41 PRESENCE OF INSULIN PUMP (EXTERNAL) (INTERNAL): ICD-10-CM

## 2020-04-07 DIAGNOSIS — L97.519 NON-PRESSURE CHRONIC ULCER OF OTHER PART OF RIGHT FOOT WITH UNSPECIFIED SEVERITY: ICD-10-CM

## 2020-04-07 DIAGNOSIS — I50.20 UNSPECIFIED SYSTOLIC (CONGESTIVE) HEART FAILURE: ICD-10-CM

## 2020-04-07 DIAGNOSIS — S72.001D FRACTURE OF UNSPECIFIED PART OF NECK OF RIGHT FEMUR, SUBSEQUENT ENCOUNTER FOR CLOSED FRACTURE WITH ROUTINE HEALING: ICD-10-CM

## 2020-04-07 DIAGNOSIS — I25.10 ATHEROSCLEROTIC HEART DISEASE OF NATIVE CORONARY ARTERY WITHOUT ANGINA PECTORIS: ICD-10-CM
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
## 2020-04-07 DIAGNOSIS — E11.40 TYPE 2 DIABETES MELLITUS WITH DIABETIC NEUROPATHY, UNSPECIFIED: ICD-10-CM

## 2020-04-07 DIAGNOSIS — E11.621 TYPE 2 DIABETES MELLITUS WITH FOOT ULCER: ICD-10-CM

## 2020-04-07 DIAGNOSIS — K90.0 CELIAC DISEASE: ICD-10-CM

## 2020-04-07 DIAGNOSIS — I11.0 HYPERTENSIVE HEART DISEASE WITH HEART FAILURE: ICD-10-CM
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
## 2020-04-07 DIAGNOSIS — F41.9 ANXIETY DISORDER, UNSPECIFIED: ICD-10-CM

## 2020-04-07 DIAGNOSIS — I95.1 ORTHOSTATIC HYPOTENSION: ICD-10-CM

## 2020-04-07 DIAGNOSIS — K81.0 ACUTE CHOLECYSTITIS: ICD-10-CM

## 2020-04-07 DIAGNOSIS — Z51.89 ENCOUNTER FOR OTHER SPECIFIED AFTERCARE: ICD-10-CM

## 2020-04-07 DIAGNOSIS — I27.20 PULMONARY HYPERTENSION, UNSPECIFIED: ICD-10-CM

## 2020-04-07 DIAGNOSIS — N40.0 BENIGN PROSTATIC HYPERPLASIA WITHOUT LOWER URINARY TRACT SYMPTOMS: ICD-10-CM

## 2020-04-07 DIAGNOSIS — Z95.5 PRESENCE OF CORONARY ANGIOPLASTY IMPLANT AND GRAFT: ICD-10-CM

## 2020-04-07 DIAGNOSIS — Z96.651 PRESENCE OF RIGHT ARTIFICIAL KNEE JOINT: ICD-10-CM

## 2020-04-07 DIAGNOSIS — I42.9 CARDIOMYOPATHY, UNSPECIFIED: ICD-10-CM

## 2020-04-07 DIAGNOSIS — E78.5 HYPERLIPIDEMIA, UNSPECIFIED: ICD-10-CM

## 2020-04-07 DIAGNOSIS — D50.9 IRON DEFICIENCY ANEMIA, UNSPECIFIED: ICD-10-CM

## 2020-04-07 DIAGNOSIS — Z79.4 LONG TERM (CURRENT) USE OF INSULIN: ICD-10-CM

## 2020-04-07 DIAGNOSIS — Z95.1 PRESENCE OF AORTOCORONARY BYPASS GRAFT: ICD-10-CM

## 2020-04-20 ENCOUNTER — APPOINTMENT (OUTPATIENT)
Dept: CARDIOLOGY | Facility: CLINIC | Age: 65
End: 2020-04-20
Payer: COMMERCIAL

## 2020-04-20 ENCOUNTER — APPOINTMENT (OUTPATIENT)
Dept: CARDIOLOGY | Facility: CLINIC | Age: 65
End: 2020-04-20

## 2020-04-20 PROCEDURE — 99441: CPT

## 2020-04-20 NOTE — REASON FOR VISIT
[FreeTextEntry1] : device management [Follow-Up - From Hospitalization] : follow-up of a recent hospitalization for

## 2020-04-20 NOTE — DISCUSSION/SUMMARY
[FreeTextEntry1] : I was not able to interrogate device remotely\par I contacted [employer] rep; a new monitor will be shipped to patient's house\par I will schedule office visit on Wed 4/29/2020 after stting remote monitor

## 2020-04-20 NOTE — HISTORY OF PRESENT ILLNESS
[FreeTextEntry1] : Mr. FLOWER MARISCAL has given me verbal authorization to provide the tele services\par Verbal consent given on 04/20/2020  by the patient.\par \par This visit was provided via telehealth using telephone technology. The patient,  Mr. FLOWER MARISCAL,   was located at home,  [address] [place] [address]\[place] [address], at the time of the visit. \par \par The patient, Mr. FLOWER MARISCAL  and Provider participated in the telehealth encounter. \par \par \par \par Referring Physician: Dr. Mihir Lopez\par \par Patient had complicated hospital course; received BiV-ICD\par lost remote transmitter prior to discharge from hospital\par He has no chest pain, no shortness of breath,  no orthopnea, no PND. He denies dizziness, lightheadedness and syncope. He presents for evaluation.\par \par \par

## 2020-04-25 ENCOUNTER — MESSAGE (OUTPATIENT)
Age: 65
End: 2020-04-25

## 2020-04-27 VITALS — HEIGHT: 73 IN | WEIGHT: 187 LBS | BODY MASS INDEX: 24.78 KG/M2

## 2020-04-27 RX ORDER — VALACYCLOVIR 1 G/1
1 TABLET, FILM COATED ORAL
Qty: 4 | Refills: 3 | Status: COMPLETED | COMMUNITY
Start: 2017-01-19 | End: 2020-04-27

## 2020-04-27 RX ORDER — DULOXETINE HYDROCHLORIDE 30 MG/1
CAPSULE, DELAYED RELEASE ORAL
Refills: 0 | Status: COMPLETED | COMMUNITY
End: 2020-04-27

## 2020-04-28 ENCOUNTER — TRANSCRIPTION ENCOUNTER (OUTPATIENT)
Age: 65
End: 2020-04-28

## 2020-04-29 ENCOUNTER — APPOINTMENT (OUTPATIENT)
Dept: CARDIOLOGY | Facility: CLINIC | Age: 65
End: 2020-04-29
Payer: COMMERCIAL

## 2020-04-29 PROCEDURE — 99214 OFFICE O/P EST MOD 30 MIN: CPT | Mod: 95

## 2020-04-29 NOTE — REASON FOR VISIT
[Follow-Up - From Hospitalization] : follow-up of a recent hospitalization for [Cardiomyopathy] : cardiomyopathy [FreeTextEntry1] : and BiV management

## 2020-04-29 NOTE — DISCUSSION/SUMMARY
[FreeTextEntry1] : I interrogated device BiV-ICD remotely through remote monitor: Battery 5.9-6.4 years; no significant arrhtyhmias; appropriate parameters; BiV pacing 94%; frequent PVC\par \par Patient scheduled for biliary drain removal\par \par I recommend to continue same medications\par \par I will reassess patient in person either at time of drain removal in hospital, or in office in 3 months;\par \par I discussed with patient plan of care in great details. I answered all his questions to his satisfaction. Patient was pleased with the visit.\par \par Patient will follow with me in 2-3 months’ time. Please do not hesitate to contact me at [phone] if you have any further questions regarding this patient care.\par \par

## 2020-04-29 NOTE — HISTORY OF PRESENT ILLNESS
[Home] : at home, [unfilled] , at the time of the visit. [Other Location: e.g. Home (Enter Location, City,State)___] : at [unfilled] [Patient] : the patient [Self] : self [FreeTextEntry1] : \par \par Referring Physician: Dr. Mihir Lopez\par \par severe systolic heart failure; LBBB\par underwent BiV-ICD on 2/11/2020\par He has no chest pain, no shortness of breath at rest, no orthopnea(uses 2 pillows), no PND. He denies dizziness, lightheadedness and syncope. He has mild exertional symptoms. LE edema ~<1. He presents for evaluation.\par \par

## 2020-04-29 NOTE — PHYSICAL EXAM
[] : no respiratory distress [Impaired Insight] : insight and judgment were intact [Affect] : the affect was normal [Oriented To Time, Place, And Person] : oriented to person, place, and time [No Anxiety] : not feeling anxious [Mood] : the mood was normal

## 2020-05-10 LAB
SARS-COV-2 IGG SERPL IA-ACNC: <0.1 INDEX
SARS-COV-2 IGG SERPL QL IA: NEGATIVE

## 2020-05-17 ENCOUNTER — OUTPATIENT (OUTPATIENT)
Dept: OUTPATIENT SERVICES | Facility: HOSPITAL | Age: 65
LOS: 1 days | Discharge: HOME | End: 2020-05-17

## 2020-05-17 DIAGNOSIS — Z11.59 ENCOUNTER FOR SCREENING FOR OTHER VIRAL DISEASES: ICD-10-CM

## 2020-05-17 DIAGNOSIS — Z98.890 OTHER SPECIFIED POSTPROCEDURAL STATES: Chronic | ICD-10-CM

## 2020-05-17 DIAGNOSIS — Z96.651 PRESENCE OF RIGHT ARTIFICIAL KNEE JOINT: Chronic | ICD-10-CM

## 2020-05-17 DIAGNOSIS — Z41.9 ENCOUNTER FOR PROCEDURE FOR PURPOSES OTHER THAN REMEDYING HEALTH STATE, UNSPECIFIED: Chronic | ICD-10-CM

## 2020-05-17 DIAGNOSIS — Z95.1 PRESENCE OF AORTOCORONARY BYPASS GRAFT: Chronic | ICD-10-CM

## 2020-05-17 DIAGNOSIS — Z95.5 PRESENCE OF CORONARY ANGIOPLASTY IMPLANT AND GRAFT: Chronic | ICD-10-CM

## 2020-05-18 LAB — SARS-COV-2 RNA SPEC QL NAA+PROBE: SIGNIFICANT CHANGE UP

## 2020-05-19 ENCOUNTER — RESULT REVIEW (OUTPATIENT)
Age: 65
End: 2020-05-19

## 2020-05-19 ENCOUNTER — OUTPATIENT (OUTPATIENT)
Dept: OUTPATIENT SERVICES | Facility: HOSPITAL | Age: 65
LOS: 1 days | Discharge: HOME | End: 2020-05-19
Payer: COMMERCIAL

## 2020-05-19 DIAGNOSIS — Z95.1 PRESENCE OF AORTOCORONARY BYPASS GRAFT: Chronic | ICD-10-CM

## 2020-05-19 DIAGNOSIS — Z41.9 ENCOUNTER FOR PROCEDURE FOR PURPOSES OTHER THAN REMEDYING HEALTH STATE, UNSPECIFIED: Chronic | ICD-10-CM

## 2020-05-19 DIAGNOSIS — Z95.5 PRESENCE OF CORONARY ANGIOPLASTY IMPLANT AND GRAFT: Chronic | ICD-10-CM

## 2020-05-19 DIAGNOSIS — Z96.651 PRESENCE OF RIGHT ARTIFICIAL KNEE JOINT: Chronic | ICD-10-CM

## 2020-05-19 DIAGNOSIS — Z98.890 OTHER SPECIFIED POSTPROCEDURAL STATES: Chronic | ICD-10-CM

## 2020-05-19 PROCEDURE — 47537 REMOVAL BILIARY DRG CATH: CPT

## 2020-05-19 NOTE — PROGRESS NOTE ADULT - SUBJECTIVE AND OBJECTIVE BOX
INTERVENTIONAL RADIOLOGY BRIEF-OPERATIVE NOTE    Procedure: Cholecystogram    Pre-Op Diagnosis: Cholecystitis post cholecystostomy    Post-Op Diagnosis: Same    Attending: Elliot Landau  Resident: None    Anesthesia (type):  [ ] General Anesthesia  [ ] Sedation  [ ] Spinal Anesthesia  [x] Local/Regional    Contrast: 10 cc    Estimated Blood Loss: < 5 cc    Condition:   [ ] Critical  [ ] Serious  [ ] Fair   [x] Good    Findings/Follow up Plan of Care: Contrast injection via existing catheter demonstrated catheter to be malpositioned, not within the gallbladder. Catheter removed. Patient tolerated procedure well.    Patient was informed that if clinical signs of infection/abdominal pain recur to return to ER.    Case discussed with Dr. Garibay.    Specimens Removed: None    Implants: None    Complications: None    Disposition: D/C home      Please call Interventional Radiology [phone] with any questions, concerns, or issues.

## 2020-05-22 DIAGNOSIS — Z48.03 ENCOUNTER FOR CHANGE OR REMOVAL OF DRAINS: ICD-10-CM

## 2020-05-28 NOTE — ED PROVIDER NOTE - CPE EDP MUSC NORM
Medication:    Outpatient Medications Marked as Taking for the 5/28/20 encounter (Refill) with Julian Renae MD   Medication Sig Dispense Refill   • amphetamine-dextroamphetamine (ADDERALL XR) 30 MG 24 hr capsule Take 1 capsule by mouth daily. 30 capsule 0       Message to Prescriber:     [x] Pharmacy has been verified.    [] Patient completely out of medication (*Route encounter as high priority if checked)    [] Patient informed refill request can take up to 3 business days to be processed    Patient currently has follow up appointment scheduled       normal...

## 2020-06-01 ENCOUNTER — APPOINTMENT (OUTPATIENT)
Dept: VASCULAR SURGERY | Facility: CLINIC | Age: 65
End: 2020-06-01
Payer: COMMERCIAL

## 2020-06-01 PROCEDURE — 99213 OFFICE O/P EST LOW 20 MIN: CPT | Mod: 95

## 2020-06-04 ENCOUNTER — TRANSCRIPTION ENCOUNTER (OUTPATIENT)
Age: 65
End: 2020-06-04

## 2020-06-04 NOTE — HISTORY OF PRESENT ILLNESS
[FreeTextEntry1] : TELEHEALTH\par verbal consent obtained\par \par pt on the video to show me the left leg wounds \par \par he was in the hospital for several weeks due to sepsis and hip fracture \par he had heart failure requiring AICD\par \par he has lost a lot of weight

## 2020-06-05 ENCOUNTER — APPOINTMENT (OUTPATIENT)
Dept: VASCULAR SURGERY | Facility: CLINIC | Age: 65
End: 2020-06-05
Payer: COMMERCIAL

## 2020-06-05 ENCOUNTER — OUTPATIENT (OUTPATIENT)
Dept: OUTPATIENT SERVICES | Facility: HOSPITAL | Age: 65
LOS: 1 days | End: 2020-06-05
Payer: COMMERCIAL

## 2020-06-05 DIAGNOSIS — Z95.1 PRESENCE OF AORTOCORONARY BYPASS GRAFT: Chronic | ICD-10-CM

## 2020-06-05 DIAGNOSIS — Z95.5 PRESENCE OF CORONARY ANGIOPLASTY IMPLANT AND GRAFT: Chronic | ICD-10-CM

## 2020-06-05 DIAGNOSIS — Z98.890 OTHER SPECIFIED POSTPROCEDURAL STATES: Chronic | ICD-10-CM

## 2020-06-05 DIAGNOSIS — Z96.651 PRESENCE OF RIGHT ARTIFICIAL KNEE JOINT: Chronic | ICD-10-CM

## 2020-06-05 DIAGNOSIS — T24.432A: ICD-10-CM

## 2020-06-05 DIAGNOSIS — Z41.9 ENCOUNTER FOR PROCEDURE FOR PURPOSES OTHER THAN REMEDYING HEALTH STATE, UNSPECIFIED: Chronic | ICD-10-CM

## 2020-06-05 LAB
GRAM STN FLD: SIGNIFICANT CHANGE UP
GRAM STN FLD: SIGNIFICANT CHANGE UP

## 2020-06-05 PROCEDURE — 99213 OFFICE O/P EST LOW 20 MIN: CPT | Mod: 25

## 2020-06-05 PROCEDURE — 87070 CULTURE OTHR SPECIMN AEROBIC: CPT

## 2020-06-05 PROCEDURE — 87075 CULTR BACTERIA EXCEPT BLOOD: CPT

## 2020-06-05 PROCEDURE — 11042 DBRDMT SUBQ TIS 1ST 20SQCM/<: CPT

## 2020-06-05 NOTE — REVIEW OF SYSTEMS
[Recent Weight Loss (___ Lbs)] : recent [unfilled] ~Ulb weight loss [Hoarseness] : hoarseness [Joint Pain] : joint pain [Arthralgias] : arthralgias [Skin Wound] : skin wound [Dry Skin] : dry skin

## 2020-06-05 NOTE — PROCEDURE
[FreeTextEntry1] : area of anterior leg prepped and draped.\par 1% lidocaine given,\par I&D of the wound is done \par cultures sent\par

## 2020-06-05 NOTE — HISTORY OF PRESENT ILLNESS
[FreeTextEntry1] : pt is here for evaluation of the ulcer on the left anterior leg\par this is causing him serious pain and he is unable to sleep at times\par he has another one on the medial malleolus that is not painful\par \par he has lost a lot of weight from his hospital stays\par he has an AICD

## 2020-06-05 NOTE — PHYSICAL EXAM
[2+] : left 2+ [Ankle Swelling (On Exam)] : not present [Varicose Veins Of Lower Extremities] : not present [] : not present [Skin Ulcer] : ulcer [Calm] : calm [de-identified] : always pleasant [de-identified] : ant shin area, 1x0.5cm very tender to touch raised, smaller one at medial malleolus, not tender

## 2020-06-08 ENCOUNTER — OUTPATIENT (OUTPATIENT)
Dept: OUTPATIENT SERVICES | Facility: HOSPITAL | Age: 65
LOS: 1 days | End: 2020-06-08

## 2020-06-08 DIAGNOSIS — Z95.1 PRESENCE OF AORTOCORONARY BYPASS GRAFT: Chronic | ICD-10-CM

## 2020-06-08 DIAGNOSIS — Z41.9 ENCOUNTER FOR PROCEDURE FOR PURPOSES OTHER THAN REMEDYING HEALTH STATE, UNSPECIFIED: Chronic | ICD-10-CM

## 2020-06-08 DIAGNOSIS — Z95.5 PRESENCE OF CORONARY ANGIOPLASTY IMPLANT AND GRAFT: Chronic | ICD-10-CM

## 2020-06-08 DIAGNOSIS — Z96.651 PRESENCE OF RIGHT ARTIFICIAL KNEE JOINT: Chronic | ICD-10-CM

## 2020-06-08 DIAGNOSIS — T24.432A: ICD-10-CM

## 2020-06-08 DIAGNOSIS — Z98.890 OTHER SPECIFIED POSTPROCEDURAL STATES: Chronic | ICD-10-CM

## 2020-06-11 ENCOUNTER — APPOINTMENT (OUTPATIENT)
Dept: BARIATRICS | Facility: CLINIC | Age: 65
End: 2020-06-11
Payer: COMMERCIAL

## 2020-06-11 LAB
CULTURE RESULTS: SIGNIFICANT CHANGE UP
SPECIMEN SOURCE: SIGNIFICANT CHANGE UP

## 2020-06-11 PROCEDURE — 99203 OFFICE O/P NEW LOW 30 MIN: CPT

## 2020-06-11 PROCEDURE — 99214 OFFICE O/P EST MOD 30 MIN: CPT

## 2020-06-18 NOTE — REVIEW OF SYSTEMS
[Hoarseness] : hoarseness [Arthralgias] : arthralgias [Recent Weight Loss (___ Lbs)] : recent [unfilled] ~Ulb weight loss [Joint Pain] : joint pain [Skin Wound] : skin wound [Dry Skin] : dry skin

## 2020-06-19 ENCOUNTER — APPOINTMENT (OUTPATIENT)
Dept: VASCULAR SURGERY | Facility: CLINIC | Age: 65
End: 2020-06-19
Payer: COMMERCIAL

## 2020-06-19 ENCOUNTER — OUTPATIENT (OUTPATIENT)
Dept: OUTPATIENT SERVICES | Facility: HOSPITAL | Age: 65
LOS: 1 days | End: 2020-06-19
Payer: COMMERCIAL

## 2020-06-19 ENCOUNTER — LABORATORY RESULT (OUTPATIENT)
Age: 65
End: 2020-06-19

## 2020-06-19 DIAGNOSIS — Z01.818 ENCOUNTER FOR OTHER PREPROCEDURAL EXAMINATION: ICD-10-CM

## 2020-06-19 DIAGNOSIS — Z95.1 PRESENCE OF AORTOCORONARY BYPASS GRAFT: Chronic | ICD-10-CM

## 2020-06-19 DIAGNOSIS — Z41.9 ENCOUNTER FOR PROCEDURE FOR PURPOSES OTHER THAN REMEDYING HEALTH STATE, UNSPECIFIED: Chronic | ICD-10-CM

## 2020-06-19 DIAGNOSIS — Z95.5 PRESENCE OF CORONARY ANGIOPLASTY IMPLANT AND GRAFT: Chronic | ICD-10-CM

## 2020-06-19 DIAGNOSIS — Z98.890 OTHER SPECIFIED POSTPROCEDURAL STATES: Chronic | ICD-10-CM

## 2020-06-19 DIAGNOSIS — Z96.651 PRESENCE OF RIGHT ARTIFICIAL KNEE JOINT: Chronic | ICD-10-CM

## 2020-06-19 LAB
ALBUMIN SERPL ELPH-MCNC: 3.8 G/DL — SIGNIFICANT CHANGE UP (ref 3.3–5)
ALP SERPL-CCNC: 144 U/L — HIGH (ref 40–120)
ALT FLD-CCNC: 32 U/L — SIGNIFICANT CHANGE UP (ref 10–45)
ANION GAP SERPL CALC-SCNC: 11 MMOL/L — SIGNIFICANT CHANGE UP (ref 5–17)
APTT BLD: 26.6 SEC — LOW (ref 27.5–36.3)
AST SERPL-CCNC: 25 U/L — SIGNIFICANT CHANGE UP (ref 10–40)
BASOPHILS # BLD AUTO: 0.07 K/UL — SIGNIFICANT CHANGE UP (ref 0–0.2)
BASOPHILS NFR BLD AUTO: 0.6 % — SIGNIFICANT CHANGE UP (ref 0–2)
BILIRUB SERPL-MCNC: 0.6 MG/DL — SIGNIFICANT CHANGE UP (ref 0.2–1.2)
BUN SERPL-MCNC: 30 MG/DL — HIGH (ref 7–23)
CALCIUM SERPL-MCNC: 9.1 MG/DL — SIGNIFICANT CHANGE UP (ref 8.4–10.5)
CHLORIDE SERPL-SCNC: 98 MMOL/L — SIGNIFICANT CHANGE UP (ref 96–108)
CO2 SERPL-SCNC: 27 MMOL/L — SIGNIFICANT CHANGE UP (ref 22–31)
CREAT SERPL-MCNC: 0.93 MG/DL — SIGNIFICANT CHANGE UP (ref 0.5–1.3)
EOSINOPHIL # BLD AUTO: 0.26 K/UL — SIGNIFICANT CHANGE UP (ref 0–0.5)
EOSINOPHIL NFR BLD AUTO: 2.4 % — SIGNIFICANT CHANGE UP (ref 0–6)
GLUCOSE SERPL-MCNC: 258 MG/DL — HIGH (ref 70–99)
HCT VFR BLD CALC: 39.9 % — SIGNIFICANT CHANGE UP (ref 39–50)
HGB BLD-MCNC: 11.9 G/DL — LOW (ref 13–17)
IMM GRANULOCYTES NFR BLD AUTO: 0.3 % — SIGNIFICANT CHANGE UP (ref 0–1.5)
INR BLD: 1.18 — HIGH (ref 0.88–1.16)
LYMPHOCYTES # BLD AUTO: 1.3 K/UL — SIGNIFICANT CHANGE UP (ref 1–3.3)
LYMPHOCYTES # BLD AUTO: 12 % — LOW (ref 13–44)
MCHC RBC-ENTMCNC: 24.2 PG — LOW (ref 27–34)
MCHC RBC-ENTMCNC: 29.8 GM/DL — LOW (ref 32–36)
MCV RBC AUTO: 81.3 FL — SIGNIFICANT CHANGE UP (ref 80–100)
MONOCYTES # BLD AUTO: 0.99 K/UL — HIGH (ref 0–0.9)
MONOCYTES NFR BLD AUTO: 9.2 % — SIGNIFICANT CHANGE UP (ref 2–14)
NEUTROPHILS # BLD AUTO: 8.16 K/UL — HIGH (ref 1.8–7.4)
NEUTROPHILS NFR BLD AUTO: 75.5 % — SIGNIFICANT CHANGE UP (ref 43–77)
NRBC # BLD: 0 /100 WBCS — SIGNIFICANT CHANGE UP (ref 0–0)
PLATELET # BLD AUTO: 294 K/UL — SIGNIFICANT CHANGE UP (ref 150–400)
POTASSIUM SERPL-MCNC: 5.3 MMOL/L — SIGNIFICANT CHANGE UP (ref 3.5–5.3)
POTASSIUM SERPL-SCNC: 5.3 MMOL/L — SIGNIFICANT CHANGE UP (ref 3.5–5.3)
PROT SERPL-MCNC: 7.2 G/DL — SIGNIFICANT CHANGE UP (ref 6–8.3)
PROTHROM AB SERPL-ACNC: 13.5 SEC — HIGH (ref 10–12.9)
RBC # BLD: 4.91 M/UL — SIGNIFICANT CHANGE UP (ref 4.2–5.8)
RBC # FLD: 17.1 % — HIGH (ref 10.3–14.5)
SODIUM SERPL-SCNC: 136 MMOL/L — SIGNIFICANT CHANGE UP (ref 135–145)
WBC # BLD: 10.81 K/UL — HIGH (ref 3.8–10.5)
WBC # FLD AUTO: 10.81 K/UL — HIGH (ref 3.8–10.5)

## 2020-06-19 PROCEDURE — 93005 ELECTROCARDIOGRAM TRACING: CPT

## 2020-06-19 PROCEDURE — 80053 COMPREHEN METABOLIC PANEL: CPT

## 2020-06-19 PROCEDURE — 85610 PROTHROMBIN TIME: CPT

## 2020-06-19 PROCEDURE — 99213 OFFICE O/P EST LOW 20 MIN: CPT

## 2020-06-19 PROCEDURE — 85730 THROMBOPLASTIN TIME PARTIAL: CPT

## 2020-06-19 PROCEDURE — 93010 ELECTROCARDIOGRAM REPORT: CPT

## 2020-06-19 PROCEDURE — 85025 COMPLETE CBC W/AUTO DIFF WBC: CPT

## 2020-06-21 ENCOUNTER — TRANSCRIPTION ENCOUNTER (OUTPATIENT)
Age: 65
End: 2020-06-21

## 2020-06-22 ENCOUNTER — INPATIENT (INPATIENT)
Facility: HOSPITAL | Age: 65
LOS: 0 days | Discharge: HOME HEALTH | DRG: 982 | End: 2020-06-22
Attending: SURGERY | Admitting: SURGERY
Payer: COMMERCIAL

## 2020-06-22 ENCOUNTER — RESULT REVIEW (OUTPATIENT)
Age: 65
End: 2020-06-22

## 2020-06-22 ENCOUNTER — APPOINTMENT (OUTPATIENT)
Dept: VASCULAR SURGERY | Facility: HOSPITAL | Age: 65
End: 2020-06-22

## 2020-06-22 VITALS
WEIGHT: 194.01 LBS | DIASTOLIC BLOOD PRESSURE: 77 MMHG | HEIGHT: 73 IN | OXYGEN SATURATION: 98 % | HEART RATE: 87 BPM | SYSTOLIC BLOOD PRESSURE: 121 MMHG | TEMPERATURE: 97 F | RESPIRATION RATE: 16 BRPM

## 2020-06-22 VITALS
DIASTOLIC BLOOD PRESSURE: 65 MMHG | TEMPERATURE: 97 F | RESPIRATION RATE: 20 BRPM | OXYGEN SATURATION: 100 % | SYSTOLIC BLOOD PRESSURE: 119 MMHG | HEART RATE: 79 BPM

## 2020-06-22 DIAGNOSIS — Z41.9 ENCOUNTER FOR PROCEDURE FOR PURPOSES OTHER THAN REMEDYING HEALTH STATE, UNSPECIFIED: Chronic | ICD-10-CM

## 2020-06-22 DIAGNOSIS — Z95.5 PRESENCE OF CORONARY ANGIOPLASTY IMPLANT AND GRAFT: Chronic | ICD-10-CM

## 2020-06-22 DIAGNOSIS — Z98.890 OTHER SPECIFIED POSTPROCEDURAL STATES: Chronic | ICD-10-CM

## 2020-06-22 DIAGNOSIS — Z95.1 PRESENCE OF AORTOCORONARY BYPASS GRAFT: Chronic | ICD-10-CM

## 2020-06-22 DIAGNOSIS — Z96.651 PRESENCE OF RIGHT ARTIFICIAL KNEE JOINT: Chronic | ICD-10-CM

## 2020-06-22 LAB
GLUCOSE BLDC GLUCOMTR-MCNC: 194 MG/DL — HIGH (ref 70–99)
GLUCOSE BLDC GLUCOMTR-MCNC: 222 MG/DL — HIGH (ref 70–99)
GRAM STN FLD: SIGNIFICANT CHANGE UP
SPECIMEN SOURCE: SIGNIFICANT CHANGE UP

## 2020-06-22 PROCEDURE — 88304 TISSUE EXAM BY PATHOLOGIST: CPT | Mod: 26

## 2020-06-22 PROCEDURE — 97606 NEG PRS WND THER DME>50 SQCM: CPT | Mod: GC

## 2020-06-22 PROCEDURE — 11044 DBRDMT BONE 1ST 20 SQ CM/<: CPT | Mod: GC

## 2020-06-22 RX ORDER — DEXTROSE 50 % IN WATER 50 %
12.5 SYRINGE (ML) INTRAVENOUS ONCE
Refills: 0 | Status: DISCONTINUED | OUTPATIENT
Start: 2020-06-22 | End: 2020-06-22

## 2020-06-22 RX ORDER — SODIUM CHLORIDE 9 MG/ML
1000 INJECTION, SOLUTION INTRAVENOUS
Refills: 0 | Status: DISCONTINUED | OUTPATIENT
Start: 2020-06-22 | End: 2020-06-22

## 2020-06-22 RX ORDER — DEXTROSE 50 % IN WATER 50 %
25 SYRINGE (ML) INTRAVENOUS ONCE
Refills: 0 | Status: DISCONTINUED | OUTPATIENT
Start: 2020-06-22 | End: 2020-06-22

## 2020-06-22 RX ORDER — OXYCODONE AND ACETAMINOPHEN 5; 325 MG/1; MG/1
1 TABLET ORAL EVERY 6 HOURS
Refills: 0 | Status: DISCONTINUED | OUTPATIENT
Start: 2020-06-22 | End: 2020-06-22

## 2020-06-22 RX ORDER — ASPIRIN/CALCIUM CARB/MAGNESIUM 324 MG
81 TABLET ORAL ONCE
Refills: 0 | Status: COMPLETED | OUTPATIENT
Start: 2020-06-22 | End: 2020-06-22

## 2020-06-22 RX ORDER — DOCUSATE SODIUM 100 MG
1 CAPSULE ORAL
Qty: 10 | Refills: 0
Start: 2020-06-22 | End: 2020-06-26

## 2020-06-22 RX ORDER — ACETAMINOPHEN 500 MG
650 TABLET ORAL EVERY 6 HOURS
Refills: 0 | Status: DISCONTINUED | OUTPATIENT
Start: 2020-06-22 | End: 2020-06-22

## 2020-06-22 RX ORDER — INSULIN LISPRO 100/ML
VIAL (ML) SUBCUTANEOUS
Refills: 0 | Status: DISCONTINUED | OUTPATIENT
Start: 2020-06-22 | End: 2020-06-22

## 2020-06-22 RX ORDER — MORPHINE SULFATE 50 MG/1
2 CAPSULE, EXTENDED RELEASE ORAL ONCE
Refills: 0 | Status: DISCONTINUED | OUTPATIENT
Start: 2020-06-22 | End: 2020-06-22

## 2020-06-22 RX ORDER — GLUCAGON INJECTION, SOLUTION 0.5 MG/.1ML
1 INJECTION, SOLUTION SUBCUTANEOUS ONCE
Refills: 0 | Status: DISCONTINUED | OUTPATIENT
Start: 2020-06-22 | End: 2020-06-22

## 2020-06-22 RX ORDER — DEXTROSE 50 % IN WATER 50 %
15 SYRINGE (ML) INTRAVENOUS ONCE
Refills: 0 | Status: DISCONTINUED | OUTPATIENT
Start: 2020-06-22 | End: 2020-06-22

## 2020-06-22 RX ADMIN — MORPHINE SULFATE 2 MILLIGRAM(S): 50 CAPSULE, EXTENDED RELEASE ORAL at 16:36

## 2020-06-22 RX ADMIN — Medication 81 MILLIGRAM(S): at 14:25

## 2020-06-22 RX ADMIN — Medication 2: at 16:35

## 2020-06-22 NOTE — PRE-OP CHECKLIST - 1.
fs 222mg/dl/anesthesiologist notified fs 222mg/dl/anesthesiologist notified Dr. lopez fs 222mg/dl/anesthesiologist notified Dr. santiago/ baby aspirin given per Dr. Santiago's order

## 2020-06-22 NOTE — BRIEF OPERATIVE NOTE - OPERATION/FINDINGS
Lidocaine administered. Excisional debridement down to muscle and bone. 9cm by 8 cm total area of excised tissue. Bone culture obtained from tibia. Hemostasis achieved. Wound vac placed.

## 2020-06-22 NOTE — PACU DISCHARGE NOTE - COMMENTS
Pt discharged to home with wet to dry dressing on wound site. VNS following up with Pt tomorrow to apply wound vac. Discharge paperwork and  instructions given. Iv heplock removed. Safety protocol maintained.

## 2020-06-22 NOTE — BRIEF OPERATIVE NOTE - NSICDXBRIEFPROCEDURE_GEN_ALL_CORE_FT
PROCEDURES:  Irrigation and excisional debridement of tissue down to bone 22-Jun-2020 15:41:36  Jassi Pacheco

## 2020-06-23 ENCOUNTER — EMERGENCY (EMERGENCY)
Facility: HOSPITAL | Age: 65
LOS: 1 days | Discharge: HOME | End: 2020-06-23
Attending: EMERGENCY MEDICINE | Admitting: EMERGENCY MEDICINE
Payer: COMMERCIAL

## 2020-06-23 VITALS
WEIGHT: 190.04 LBS | DIASTOLIC BLOOD PRESSURE: 69 MMHG | HEART RATE: 85 BPM | RESPIRATION RATE: 16 BRPM | OXYGEN SATURATION: 96 % | HEIGHT: 73 IN | TEMPERATURE: 97 F | SYSTOLIC BLOOD PRESSURE: 110 MMHG

## 2020-06-23 VITALS
DIASTOLIC BLOOD PRESSURE: 68 MMHG | TEMPERATURE: 98 F | SYSTOLIC BLOOD PRESSURE: 111 MMHG | RESPIRATION RATE: 16 BRPM | HEART RATE: 84 BPM | OXYGEN SATURATION: 97 %

## 2020-06-23 DIAGNOSIS — Z41.9 ENCOUNTER FOR PROCEDURE FOR PURPOSES OTHER THAN REMEDYING HEALTH STATE, UNSPECIFIED: Chronic | ICD-10-CM

## 2020-06-23 DIAGNOSIS — Z96.651 PRESENCE OF RIGHT ARTIFICIAL KNEE JOINT: Chronic | ICD-10-CM

## 2020-06-23 DIAGNOSIS — Z95.5 PRESENCE OF CORONARY ANGIOPLASTY IMPLANT AND GRAFT: Chronic | ICD-10-CM

## 2020-06-23 DIAGNOSIS — Z95.1 PRESENCE OF AORTOCORONARY BYPASS GRAFT: Chronic | ICD-10-CM

## 2020-06-23 DIAGNOSIS — Z98.890 OTHER SPECIFIED POSTPROCEDURAL STATES: Chronic | ICD-10-CM

## 2020-06-23 LAB
NIGHT BLUE STAIN TISS: SIGNIFICANT CHANGE UP
SPECIMEN SOURCE: SIGNIFICANT CHANGE UP

## 2020-06-23 PROCEDURE — 88304 TISSUE EXAM BY PATHOLOGIST: CPT

## 2020-06-23 PROCEDURE — 99284 EMERGENCY DEPT VISIT MOD MDM: CPT

## 2020-06-23 PROCEDURE — 87015 SPECIMEN INFECT AGNT CONCNTJ: CPT

## 2020-06-23 PROCEDURE — 87075 CULTR BACTERIA EXCEPT BLOOD: CPT

## 2020-06-23 PROCEDURE — 87116 MYCOBACTERIA CULTURE: CPT

## 2020-06-23 PROCEDURE — 82962 GLUCOSE BLOOD TEST: CPT

## 2020-06-23 PROCEDURE — 87206 SMEAR FLUORESCENT/ACID STAI: CPT

## 2020-06-23 PROCEDURE — 87070 CULTURE OTHR SPECIMN AEROBIC: CPT

## 2020-06-23 RX ORDER — OXYCODONE AND ACETAMINOPHEN 5; 325 MG/1; MG/1
1 TABLET ORAL ONCE
Refills: 0 | Status: DISCONTINUED | OUTPATIENT
Start: 2020-06-23 | End: 2020-06-23

## 2020-06-23 RX ADMIN — OXYCODONE AND ACETAMINOPHEN 1 TABLET(S): 5; 325 TABLET ORAL at 12:25

## 2020-06-23 NOTE — ED PROVIDER NOTE - PATIENT PORTAL LINK FT
You can access the FollowMyHealth Patient Portal offered by [facility] by registering at the following website: [email or web address]. By joining [employer]’s FollowMyHealth portal, you will also be able to view your health information using other applications (apps) compatible with our system.

## 2020-06-23 NOTE — ED PROVIDER NOTE - CARE PROVIDER_API CALL
Noel Malloy N  SURGERY  [address] [place] [address]  Phone: [phone]  Fax: [phone]  Follow Up Time:

## 2020-06-23 NOTE — ED ADULT NURSE NOTE - OBJECTIVE STATEMENT
Pt states "I had a wound vac and it was removed but the supply the nurse brought was not the same so they told me to do wet to dry dressings and my leg started to bleed and did not stop bleeding". PA was just at bedside, removed dressing and noted their was no active bleeding at the time.

## 2020-06-23 NOTE — ED ADULT NURSE REASSESSMENT NOTE - NS ED NURSE REASSESS COMMENT FT1
called Vascular PACHECO Cortes to clarify patients discharge instructions. PT is to do the followin. not touch dressing today, 2. shower tomorrow, do wet to dry and apply surgicel to wound if bleeding only. 3. tomorrow a wound vac will be applied 4. follow up with vascular on friday in [place]. pt and wife both instructed.

## 2020-06-23 NOTE — ED PROVIDER NOTE - MUSCULOSKELETAL, MLM
Spine appears normal, range of motion is not limited, no muscle or joint tenderness; L LE: a 5x6 cm debrided wound to mid anterior shin, slight oozing but no heavy bleeding, clean w/o signs of inf, no pus, no swelling, no lymphangitis, pedal pulse 2+. sensation intact

## 2020-06-23 NOTE — ED PROVIDER NOTE - CLINICAL SUMMARY MEDICAL DECISION MAKING FREE TEXT BOX
pt w/bleeding from debridement site on L LE, clean w/o any signs of inf, vascular eval'd and changed dressing - wound vac will be placed ronal, wound care discussed w/pt, strict return precautions given, pt understands and agrees w/plan

## 2020-06-23 NOTE — ED ADULT TRIAGE NOTE - CHIEF COMPLAINT QUOTE
pt had wound debridement to left lower extremity, pt had wound vac removed & wound is actively bleeding. sent by vascular

## 2020-06-23 NOTE — ED PROVIDER NOTE - OBJECTIVE STATEMENT
The pt is a 63 y/o M, who presents to ED c/o bleeding wound - had necrotic wound debrided yest by vascular surg, but was not able to get a wound vac placed. Pt states that bled through his dressing. Denies numbness or tingling to toes, fevers, chills, injury, decreased ROM

## 2020-06-23 NOTE — ED ADULT NURSE NOTE - PAIN RATING/NUMBER SCALE (0-10): ACTIVITY
Denies known Latex allergy or symptoms of Latex sensitivity.  Medications reviewed with patient.  Tobacco use verified.  Surgical and medical history reviewed, no changes.  Julieta Sharp returns to the clinic today for a recheck of the right foot. The patient has some shooting pain in the great toe..     
I have personally performed patient's history, physical exam, clinical impressions and treatment plan and agree with above by SHONDA Curiel.    
Artemio Swanson MD.     
4

## 2020-06-23 NOTE — CONSULT NOTE ADULT - SUBJECTIVE AND OBJECTIVE BOX
Attending: Mellissa    HPI:     64M extensive PMHx significant for CABG, HFrEF (25-30%) s/p St Kali BiV placement, DM (on insulin pump), celiac disease, diverticulitis, R total knee replacement c/b infection who presented 6/23 for excisional debridement of L leg ulcer and was discharged the same day with WTD dressing. He presents today complaining of excessive bleeding. Has changed bandage twice at home and once in ED. Otherwise no complaints.    ROS negative except for above.       PAST MEDICAL & SURGICAL HISTORY:  Diabetic neuropathy  STEMI (ST elevation myocardial infarction)  Diverticulitis  MRSA bacteremia  History of celiac disease  CHF (congestive heart failure): EF ~ 25%  HTN (hypertension)  Diabetes: on insulin pump  Blood clot due to device, implant, or graft: was on blood thinners  HLD (hyperlipidemia)  Osteoarthritis  Atherosclerosis of coronary artery: CAD (coronary artery disease)  Status post percutaneous transluminal coronary angioplasty: in 2012  History of open reduction and internal fixation (ORIF) procedure  Surgery, elective: Right shoulder  Surgery, elective: right knee wound debridement  S/P TKR (total knee replacement), right: with infection Mrsa   per pt he was cleared from MRSA infection  S/P CABG x 1: 2018  Stented coronary artery: 10/18 heart attack  INFERIOR WALL MI  Other postprocedural status: Fixation hardware in foot LEFT    MEDICATIONS  (STANDING):    Cymbalta 30 MG Oral Capsule Delayed Release Particles; TAKE 3 CAPSULE Daily  Digoxin 250 MCG Oral Tablet  Ezetimibe 10 MG Oral Tablet; take 1 tablet by mouth at bedtime  Fiasp 100 UNIT/ML Subcutaneous Solution; use up to 150 units a day via pump  FreeStyle Cathleen 14 Day [place] Device; USE AS DIRECTED  FreeStyle Cathleen 14 Day Sensor; TEST as directed  Insulin Human POWD  Jardiance 10 MG Oral Tablet; TAKE ONE TABLET BY MOUTH DAILY  Lasix 40 MG Oral Tablet; TAKE TABLET  PRN  Metoprolol Succinate ER 25 MG Oral Tablet Extended Release 24 Hour; Take 1 tablet  twice daily  Mupirocin 2 % External Ointment; APPLY SPARINGLY TO AFFECTED AREA(S) TWICE  DAILY  [place] 3 CAPS; TAKE 1 CAPSULE Daily  oxyCODONE-Acetaminophen 5-325 MG Oral Tablet; TAKE 2 TABLETS EVERY 6 HOURS  AS NEEDED FOR PAIN. MDD:4 tabs  Prasugrel HCl - 10 MG Oral Tablet; TAKE 1 TABLET DAILY  Protonix 40 MG Oral Tablet Delayed Release; Take 1 tablet daily  Rosuvastatin Calcium 40 MG Oral Tablet; TAKE 1 TABLET BY MOUTH DAILY  SB Low Dose ASA EC 81 MG Oral Tablet Delayed Release; TAKE 1 TABLET DAILY  Silver sulfADIAZINE 1 % External Cream; APPLY TO AFFECTED AREA TWICE DAILY AS  DIRECTED  Spironolactone 25 MG Oral Tablet; Take 1 tablet daily  Trulicity 1.5 MG/0.5ML Subcutaneous Solution Pen-injector; Use as directed one  injection subcutaneously weekly  Zilretta 32 MG Intra-articular Suspension Reconstituted ER; Zilretta 32 mg vial, injection 1  unit to bilateral knees    Allergies    ACE inhibitors (Hives)  carvedilol (Other)  enalapril (Hives)  Entresto (Other)    Intolerances    FAMILY HISTORY:  Family history of allergies: daughter  Family history of heart disease (Mother)    T(C): 36.7 (06-23-20 @ 13:36), Max: 36.7 (06-23-20 @ 13:36)  HR: 84 (06-23-20 @ 13:36) (75 - 85)  BP: 111/68 (06-23-20 @ 13:36) (97/54 - 124/65)  RR: 16 (06-23-20 @ 13:36) (12 - 20)  SpO2: 97% (06-23-20 @ 13:36) (96% - 100%)    Physical Exam:  General: NAD, resting comfortably in bed  HEENT: MMM  Pulmonary: nonlabored breathing, normal resp effort  Cardiovascular: RRR  Abdominal: soft, nontender, nondistended  Extremities: WWP, no edema, no calf tenderness  Neuro: no focal deficits  Psych: pleasant        Assessment: 64M extensive PMH as above, presents on POD1 s/p ambulatory L leg debridement for evaluation of ongoing bleeding. At bedside, 2 vicryl stitches placed with resolution of bleeding.     Recommendations:  - follow up with Dr. Malloy on 6/26  - Wet to dry dressing until tomorrow when home vac will be placed.   - discussed with chief on call, attending surgeon Attending: Mellissa    HPI:     64M extensive PMHx significant for CABG, HFrEF (25-30%) s/p St Kali BiV placement, DM (on insulin pump), celiac disease, diverticulitis, R total knee replacement c/b infection who presented 6/23 for excisional debridement of L leg ulcer and was discharged the same day with WTD dressing. He presents today complaining of excessive bleeding. Has changed bandage twice at home and once in ED. Otherwise no complaints.    ROS negative except for above.       PAST MEDICAL & SURGICAL HISTORY:  Diabetic neuropathy  STEMI (ST elevation myocardial infarction)  Diverticulitis  MRSA bacteremia  History of celiac disease  CHF (congestive heart failure): EF ~ 25%  HTN (hypertension)  Diabetes: on insulin pump  Blood clot due to device, implant, or graft: was on blood thinners  HLD (hyperlipidemia)  Osteoarthritis  Atherosclerosis of coronary artery: CAD (coronary artery disease)  Status post percutaneous transluminal coronary angioplasty: in 2012  History of open reduction and internal fixation (ORIF) procedure  Surgery, elective: Right shoulder  Surgery, elective: right knee wound debridement  S/P TKR (total knee replacement), right: with infection Mrsa   per pt he was cleared from MRSA infection  S/P CABG x 1: 2018  Stented coronary artery: 10/18 heart attack  INFERIOR WALL MI  Other postprocedural status: Fixation hardware in foot LEFT    MEDICATIONS  (STANDING):    Cymbalta 30 MG Oral Capsule Delayed Release Particles; TAKE 3 CAPSULE Daily  Digoxin 250 MCG Oral Tablet  Ezetimibe 10 MG Oral Tablet; take 1 tablet by mouth at bedtime  Fiasp 100 UNIT/ML Subcutaneous Solution; use up to 150 units a day via pump  FreeStyle Cathleen 14 Day [place] Device; USE AS DIRECTED  FreeStyle Cathleen 14 Day Sensor; TEST as directed  Insulin Human POWD  Jardiance 10 MG Oral Tablet; TAKE ONE TABLET BY MOUTH DAILY  Lasix 40 MG Oral Tablet; TAKE TABLET  PRN  Metoprolol Succinate ER 25 MG Oral Tablet Extended Release 24 Hour; Take 1 tablet  twice daily  Mupirocin 2 % External Ointment; APPLY SPARINGLY TO AFFECTED AREA(S) TWICE  DAILY  [place] 3 CAPS; TAKE 1 CAPSULE Daily  oxyCODONE-Acetaminophen 5-325 MG Oral Tablet; TAKE 2 TABLETS EVERY 6 HOURS  AS NEEDED FOR PAIN. MDD:4 tabs  Prasugrel HCl - 10 MG Oral Tablet; TAKE 1 TABLET DAILY  Protonix 40 MG Oral Tablet Delayed Release; Take 1 tablet daily  Rosuvastatin Calcium 40 MG Oral Tablet; TAKE 1 TABLET BY MOUTH DAILY  SB Low Dose ASA EC 81 MG Oral Tablet Delayed Release; TAKE 1 TABLET DAILY  Silver sulfADIAZINE 1 % External Cream; APPLY TO AFFECTED AREA TWICE DAILY AS  DIRECTED  Spironolactone 25 MG Oral Tablet; Take 1 tablet daily  Trulicity 1.5 MG/0.5ML Subcutaneous Solution Pen-injector; Use as directed one  injection subcutaneously weekly  Zilretta 32 MG Intra-articular Suspension Reconstituted ER; Zilretta 32 mg vial, injection 1  unit to bilateral knees    Allergies    ACE inhibitors (Hives)  carvedilol (Other)  enalapril (Hives)  Entresto (Other)    Intolerances    FAMILY HISTORY:  Family history of allergies: daughter  Family history of heart disease (Mother)    T(C): 36.7 (06-23-20 @ 13:36), Max: 36.7 (06-23-20 @ 13:36)  HR: 84 (06-23-20 @ 13:36) (75 - 85)  BP: 111/68 (06-23-20 @ 13:36) (97/54 - 124/65)  RR: 16 (06-23-20 @ 13:36) (12 - 20)  SpO2: 97% (06-23-20 @ 13:36) (96% - 100%)    Physical Exam:  General: NAD, resting comfortably in bed  HEENT: MMM  Pulmonary: nonlabored breathing, normal resp effort  Cardiovascular: RRR  Abdominal: soft, nontender, nondistended  Extremities: L leg wound with pinpoint bleeding on superior aspect of wound, slight oozing from tibia, signals on all distal pulses  Neuro: no focal deficits  Psych: pleasant        Assessment: 64M extensive PMH as above, presents on POD1 s/p ambulatory L leg debridement for evaluation of ongoing bleeding. At bedside, 2 vicryl stitches placed, silver nitrate applied to skin edge, and bone wax applied to tibia with resolution of bleeding.     Recommendations:  - follow up with Dr. Malloy on 6/26  - Wet to dry dressing until tomorrow when home vac will be placed.   - discussed with chief on call, attending surgeon

## 2020-06-23 NOTE — ED PROVIDER NOTE - ATTENDING CONTRIBUTION TO CARE
Attending Statement: I have personally performed a face to face diagnostic evaluation on this patient. I have reviewed the ACP note and agree with the history, exam and plan of care, except as noted.     Attending Contribution to Care:  64M extensive PMH above, presents on POD1 s/p ambulatory L leg debridement for evaluation of ongoing bleeding and did not have wound vac delivered, vascular consulted and at bedside. At bedside, 2 vicryl stitches placed, silver nitrate applied to skin edge, and bone wax applied to tibia with resolution of bleeding. Ok for DC per vascular, wound vac to be delivered.

## 2020-06-24 LAB — SURGICAL PATHOLOGY STUDY: SIGNIFICANT CHANGE UP

## 2020-06-24 NOTE — PHYSICAL EXAM
[2+] : left 2+ [Calm] : calm [Skin Ulcer] : ulcer [] : not present [de-identified] :  pleasant, NAD [Ankle Swelling (On Exam)] : not present [Varicose Veins Of Lower Extremities] : not present [de-identified] : LLE: + 2 x 2 cm raised ulcer with thick exudate and surrounding erythema, tender

## 2020-06-24 NOTE — HISTORY OF PRESENT ILLNESS
[FreeTextEntry1] : 65 yo M, with multiple medical issues who developed  ulcer on the left anterior leg that was I&D 2 weeks ago returns for a f/u. Cultures was obtained from the wound that didn't grow any bacteria. Patient states that his ulcer is more painful and he is concerned that it's not healing. He continues wound care by taking shower with soap/water, then cleaning with Hydrogen Peroxide and applying Bacitracin. No fever, chills.\par \par \par

## 2020-06-24 NOTE — ASSESSMENT
[Ulcer Care] : ulcer care [FreeTextEntry1] : 65 yo M, with multiple medical issues who developed  ulcer on the left anterior leg that was I&D 2 weeks ago returns for a f/u.\par Ulcer is not healing and is getting worse.\par We recommend for patient to have surgical debridement next week, he agreed and would like to proceed.\par He will go for pre-op testing today and procedure will be scheduled for Monday, 6/22/2020.\par Patient will be started on PO Augmentin.

## 2020-06-25 ENCOUNTER — APPOINTMENT (OUTPATIENT)
Dept: ORTHOPEDIC SURGERY | Facility: CLINIC | Age: 65
End: 2020-06-25

## 2020-06-26 ENCOUNTER — APPOINTMENT (OUTPATIENT)
Dept: VASCULAR SURGERY | Facility: CLINIC | Age: 65
End: 2020-06-26
Payer: COMMERCIAL

## 2020-06-26 PROCEDURE — 99213 OFFICE O/P EST LOW 20 MIN: CPT | Mod: 25

## 2020-06-26 PROCEDURE — 97606 NEG PRS WND THER DME>50 SQCM: CPT

## 2020-06-27 DIAGNOSIS — L76.21 POSTPROCEDURAL HEMORRHAGE OF SKIN AND SUBCUTANEOUS TISSUE FOLLOWING A DERMATOLOGIC PROCEDURE: ICD-10-CM

## 2020-06-27 DIAGNOSIS — Z48.01 ENCOUNTER FOR CHANGE OR REMOVAL OF SURGICAL WOUND DRESSING: ICD-10-CM

## 2020-06-27 LAB
CULTURE RESULTS: NO GROWTH — SIGNIFICANT CHANGE UP
SPECIMEN SOURCE: SIGNIFICANT CHANGE UP

## 2020-06-30 ENCOUNTER — APPOINTMENT (OUTPATIENT)
Dept: ORTHOPEDIC SURGERY | Facility: CLINIC | Age: 65
End: 2020-06-30
Payer: COMMERCIAL

## 2020-06-30 DIAGNOSIS — L97.924: ICD-10-CM

## 2020-06-30 DIAGNOSIS — E11.622 TYPE 2 DIABETES MELLITUS WITH OTHER SKIN ULCER: ICD-10-CM

## 2020-06-30 DIAGNOSIS — Z95.0 PRESENCE OF CARDIAC PACEMAKER: ICD-10-CM

## 2020-06-30 DIAGNOSIS — Z96.651 PRESENCE OF RIGHT ARTIFICIAL KNEE JOINT: ICD-10-CM

## 2020-06-30 DIAGNOSIS — I11.0 HYPERTENSIVE HEART DISEASE WITH HEART FAILURE: ICD-10-CM

## 2020-06-30 DIAGNOSIS — I27.20 PULMONARY HYPERTENSION, UNSPECIFIED: ICD-10-CM

## 2020-06-30 DIAGNOSIS — M17.10 UNILATERAL PRIMARY OSTEOARTHRITIS, UNSPECIFIED KNEE: ICD-10-CM

## 2020-06-30 DIAGNOSIS — I25.10 ATHEROSCLEROTIC HEART DISEASE OF NATIVE CORONARY ARTERY WITHOUT ANGINA PECTORIS: ICD-10-CM

## 2020-06-30 PROCEDURE — 99442: CPT

## 2020-06-30 NOTE — DISCUSSION/SUMMARY
[FreeTextEntry1] : 63 yo M, with multiple medical issues who developed ulcer on the left anterior leg that hasn't healed for few months and required surgical debridement on 6/22/20 returns for a post-op visit.\par LLE wound appears to be clean, with good granulation tissue.\par Patient was recommended to wash the area with soap/water prior to changing wound vac.\par Wound vac was applied in the office.\par F/u in 2 weeks or sooner if necessary.

## 2020-06-30 NOTE — DISCUSSION/SUMMARY
[de-identified] : Will obtain Zilretta for left knee. Will check with other providers regarding timing. He is diabetic.

## 2020-06-30 NOTE — REVIEW OF SYSTEMS
[As Noted in HPI] : as noted in HPI [Skin Wound] : skin wound [Negative] : Heme/Lymph [Fever] : no fever [Chills] : no chills

## 2020-06-30 NOTE — HISTORY OF PRESENT ILLNESS
[de-identified] : Telephonic consultation\par \par Long history of issues. Most recently had a hip fracture. Was in [facility] for quite some time. Also some liver/GB issues. A wound on his shin was recently extensively debrided by Dr. Malloy. \par \par Did not catch COVID.\par \par Had Zilretta in his left knee and did well. \par \par Right knee has been replaced.\par \par \par

## 2020-06-30 NOTE — REASON FOR VISIT
[de-identified] : 6/22/2020 [de-identified] : LLE wound debridement [de-identified] : 65 yo M, with multiple medical issues who developed ulcer on the left anterior leg that hasn't healed for few months and required surgical debridement returns for a post-op visit. Patient was seen in ED one day after the procedure due to bleeding. Sutures were placed and edges of the wound were cauterized by Silver nitrate. He has a wound vac at home that he had for 1 day and had to remove it due to poor seal. He c/o mild to moderate pain at the wound site, otherwise no fever, chills, recurrent bleeding.\par

## 2020-06-30 NOTE — PHYSICAL EXAM
[2+] : left 2+ [Alert] : alert [Calm] : calm [Ankle Swelling (On Exam)] : not present [de-identified] : WN/WD, NAD [de-identified] : + FROM [de-identified] : LLE: + 10 x 10 cm wound with good granulation tissue, no bleeding, no signs of infection

## 2020-07-02 PROCEDURE — 97112 NEUROMUSCULAR REEDUCATION: CPT

## 2020-07-02 PROCEDURE — 99284 EMERGENCY DEPT VISIT MOD MDM: CPT | Mod: 25

## 2020-07-02 PROCEDURE — 97530 THERAPEUTIC ACTIVITIES: CPT

## 2020-07-02 PROCEDURE — 97110 THERAPEUTIC EXERCISES: CPT
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
## 2020-07-10 ENCOUNTER — APPOINTMENT (OUTPATIENT)
Dept: VASCULAR SURGERY | Facility: CLINIC | Age: 65
End: 2020-07-10
Payer: COMMERCIAL

## 2020-07-10 PROCEDURE — 99214 OFFICE O/P EST MOD 30 MIN: CPT

## 2020-07-10 NOTE — HISTORY OF PRESENT ILLNESS
[FreeTextEntry1] : s/p left leg excision of large ulcer\par \par had VAC on and he had unbearable pain\par now he switched to santyl as per the nurse\par \par no fever\par patient feels that he is not progressing \par

## 2020-07-10 NOTE — PHYSICAL EXAM
[2+] : left 2+ [Ankle Swelling (On Exam)] : not present [Varicose Veins Of Lower Extremities] : not present [] : not present [Skin Ulcer] : ulcer [Calm] : calm [de-identified] : distressed [de-identified] : ant shin area, 1x0.5cm very tender to touch raised, smaller one at medial malleolus, not tender

## 2020-07-15 NOTE — ASSESSMENT
[FreeTextEntry1] : 64 yo diabetic M s/p left leg excision of large ulcer returns for a follow up. [Ulcer Care] : ulcer care

## 2020-07-15 NOTE — HISTORY OF PRESENT ILLNESS
[FreeTextEntry1] : 64 yo diabetic M s/p left leg excision of large ulcer returns for a follow up.\par Patient was recommended to stop using a wound vac since he had severe pain and  he was switched to SoloSite.\par \par

## 2020-07-15 NOTE — PHYSICAL EXAM
[2+] : right 2+ [Ankle Swelling (On Exam)] : not present [Varicose Veins Of Lower Extremities] : not present [] : not present [Skin Ulcer] : ulcer [Calm] : calm [de-identified] : NAD, pleasant [de-identified] : Left anterior  shin area 10 x 8 cm ulcer

## 2020-07-16 ENCOUNTER — INPATIENT (INPATIENT)
Facility: HOSPITAL | Age: 65
LOS: 24 days | Discharge: HOME HEALTH | DRG: 485 | End: 2020-08-10
Attending: ORTHOPAEDIC SURGERY | Admitting: ORTHOPAEDIC SURGERY
Payer: COMMERCIAL

## 2020-07-16 ENCOUNTER — TRANSCRIPTION ENCOUNTER (OUTPATIENT)
Age: 65
End: 2020-07-16

## 2020-07-16 VITALS
WEIGHT: 182.1 LBS | TEMPERATURE: 100 F | HEIGHT: 73 IN | RESPIRATION RATE: 18 BRPM | HEART RATE: 97 BPM | SYSTOLIC BLOOD PRESSURE: 124 MMHG | DIASTOLIC BLOOD PRESSURE: 74 MMHG | OXYGEN SATURATION: 99 %

## 2020-07-16 DIAGNOSIS — Z95.1 PRESENCE OF AORTOCORONARY BYPASS GRAFT: Chronic | ICD-10-CM

## 2020-07-16 DIAGNOSIS — Z41.9 ENCOUNTER FOR PROCEDURE FOR PURPOSES OTHER THAN REMEDYING HEALTH STATE, UNSPECIFIED: Chronic | ICD-10-CM

## 2020-07-16 DIAGNOSIS — Z95.5 PRESENCE OF CORONARY ANGIOPLASTY IMPLANT AND GRAFT: Chronic | ICD-10-CM

## 2020-07-16 DIAGNOSIS — Z96.651 PRESENCE OF RIGHT ARTIFICIAL KNEE JOINT: Chronic | ICD-10-CM

## 2020-07-16 DIAGNOSIS — Z98.890 OTHER SPECIFIED POSTPROCEDURAL STATES: Chronic | ICD-10-CM

## 2020-07-16 LAB
ALBUMIN SERPL ELPH-MCNC: 3.4 G/DL — SIGNIFICANT CHANGE UP (ref 3.3–5)
ALP SERPL-CCNC: 130 U/L — HIGH (ref 40–120)
ALT FLD-CCNC: 34 U/L — SIGNIFICANT CHANGE UP (ref 10–45)
ANION GAP SERPL CALC-SCNC: 15 MMOL/L — SIGNIFICANT CHANGE UP (ref 5–17)
APPEARANCE UR: CLEAR — SIGNIFICANT CHANGE UP
APTT BLD: 24.3 SEC — LOW (ref 27.5–35.5)
AST SERPL-CCNC: 35 U/L — SIGNIFICANT CHANGE UP (ref 10–40)
B PERT IGG+IGM PNL SER: SIGNIFICANT CHANGE UP
B PERT IGG+IGM PNL SER: SIGNIFICANT CHANGE UP
BASOPHILS # BLD AUTO: 0.05 K/UL — SIGNIFICANT CHANGE UP (ref 0–0.2)
BASOPHILS NFR BLD AUTO: 0.4 % — SIGNIFICANT CHANGE UP (ref 0–2)
BILIRUB SERPL-MCNC: 0.9 MG/DL — SIGNIFICANT CHANGE UP (ref 0.2–1.2)
BILIRUB UR-MCNC: NEGATIVE — SIGNIFICANT CHANGE UP
BLD GP AB SCN SERPL QL: NEGATIVE — SIGNIFICANT CHANGE UP
BUN SERPL-MCNC: 25 MG/DL — HIGH (ref 7–23)
CALCIUM SERPL-MCNC: 9.2 MG/DL — SIGNIFICANT CHANGE UP (ref 8.4–10.5)
CHLORIDE SERPL-SCNC: 89 MMOL/L — LOW (ref 96–108)
CK SERPL-CCNC: 48 U/L — SIGNIFICANT CHANGE UP (ref 30–200)
CO2 SERPL-SCNC: 24 MMOL/L — SIGNIFICANT CHANGE UP (ref 22–31)
COLOR FLD: YELLOW — SIGNIFICANT CHANGE UP
COLOR FLD: YELLOW — SIGNIFICANT CHANGE UP
COLOR SPEC: YELLOW — SIGNIFICANT CHANGE UP
CREAT SERPL-MCNC: 0.89 MG/DL — SIGNIFICANT CHANGE UP (ref 0.5–1.3)
CRP SERPL-MCNC: 23.64 MG/DL — HIGH (ref 0–0.4)
DIFF PNL FLD: NEGATIVE — SIGNIFICANT CHANGE UP
EOSINOPHIL # BLD AUTO: 0.06 K/UL — SIGNIFICANT CHANGE UP (ref 0–0.5)
EOSINOPHIL NFR BLD AUTO: 0.5 % — SIGNIFICANT CHANGE UP (ref 0–6)
ERYTHROCYTE [SEDIMENTATION RATE] IN BLOOD: 95 MM/HR — HIGH
FLUID INTAKE SUBSTANCE CLASS: SIGNIFICANT CHANGE UP
FLUID INTAKE SUBSTANCE CLASS: SIGNIFICANT CHANGE UP
FLUID SEGMENTED GRANULOCYTES: 98 % — SIGNIFICANT CHANGE UP
FLUID SEGMENTED GRANULOCYTES: 99 % — SIGNIFICANT CHANGE UP
GAS PNL BLDV: SIGNIFICANT CHANGE UP
GLUCOSE BLDC GLUCOMTR-MCNC: 272 MG/DL — HIGH (ref 70–99)
GLUCOSE SERPL-MCNC: 324 MG/DL — HIGH (ref 70–99)
GLUCOSE UR QL: >=1000
HCT VFR BLD CALC: 43.2 % — SIGNIFICANT CHANGE UP (ref 39–50)
HGB BLD-MCNC: 13.1 G/DL — SIGNIFICANT CHANGE UP (ref 13–17)
IMM GRANULOCYTES NFR BLD AUTO: 0.4 % — SIGNIFICANT CHANGE UP (ref 0–1.5)
INR BLD: 1.28 — HIGH (ref 0.88–1.16)
KETONES UR-MCNC: 15 MG/DL
LACTATE SERPL-SCNC: 1.5 MMOL/L — SIGNIFICANT CHANGE UP (ref 0.5–2)
LEUKOCYTE ESTERASE UR-ACNC: NEGATIVE — SIGNIFICANT CHANGE UP
LYMPHOCYTES # BLD AUTO: 0.6 K/UL — LOW (ref 1–3.3)
LYMPHOCYTES # BLD AUTO: 4.5 % — LOW (ref 13–44)
LYMPHOCYTES # FLD: 1 % — SIGNIFICANT CHANGE UP
LYMPHOCYTES # FLD: 1 % — SIGNIFICANT CHANGE UP
MCHC RBC-ENTMCNC: 23.4 PG — LOW (ref 27–34)
MCHC RBC-ENTMCNC: 30.3 GM/DL — LOW (ref 32–36)
MCV RBC AUTO: 77.3 FL — LOW (ref 80–100)
MONOCYTES # BLD AUTO: 1.27 K/UL — HIGH (ref 0–0.9)
MONOCYTES NFR BLD AUTO: 9.6 % — SIGNIFICANT CHANGE UP (ref 2–14)
MONOS+MACROS # FLD: 1 % — SIGNIFICANT CHANGE UP
NEUTROPHILS # BLD AUTO: 11.21 K/UL — HIGH (ref 1.8–7.4)
NEUTROPHILS NFR BLD AUTO: 84.6 % — HIGH (ref 43–77)
NITRITE UR-MCNC: NEGATIVE — SIGNIFICANT CHANGE UP
NRBC # BLD: 0 /100 WBCS — SIGNIFICANT CHANGE UP (ref 0–0)
PH UR: 6 — SIGNIFICANT CHANGE UP (ref 5–8)
PLATELET # BLD AUTO: 366 K/UL — SIGNIFICANT CHANGE UP (ref 150–400)
POTASSIUM SERPL-MCNC: 4.5 MMOL/L — SIGNIFICANT CHANGE UP (ref 3.5–5.3)
POTASSIUM SERPL-SCNC: 4.5 MMOL/L — SIGNIFICANT CHANGE UP (ref 3.5–5.3)
PROT SERPL-MCNC: 7.2 G/DL — SIGNIFICANT CHANGE UP (ref 6–8.3)
PROT UR-MCNC: ABNORMAL MG/DL
PROTHROM AB SERPL-ACNC: 15.2 SEC — HIGH (ref 10.6–13.6)
RBC # BLD: 5.59 M/UL — SIGNIFICANT CHANGE UP (ref 4.2–5.8)
RBC # FLD: 17.8 % — HIGH (ref 10.3–14.5)
RCV VOL RI: 4000 /UL — HIGH (ref 0–0)
RCV VOL RI: HIGH /UL (ref 0–0)
RH IG SCN BLD-IMP: POSITIVE — SIGNIFICANT CHANGE UP
SARS-COV-2 RNA SPEC QL NAA+PROBE: SIGNIFICANT CHANGE UP
SODIUM SERPL-SCNC: 128 MMOL/L — LOW (ref 135–145)
SP GR SPEC: 1.01 — SIGNIFICANT CHANGE UP (ref 1–1.03)
SPECIMEN SOURCE FLD: SIGNIFICANT CHANGE UP
SPECIMEN SOURCE FLD: SIGNIFICANT CHANGE UP
SYNOVIAL CRYSTALS CLARITY: ABNORMAL
SYNOVIAL CRYSTALS CLARITY: ABNORMAL
SYNOVIAL CRYSTALS COLOR: YELLOW
SYNOVIAL CRYSTALS COLOR: YELLOW
SYNOVIAL CRYSTALS ID: SIGNIFICANT CHANGE UP
SYNOVIAL CRYSTALS ID: SIGNIFICANT CHANGE UP
SYNOVIAL CRYSTALS TUBE: SIGNIFICANT CHANGE UP
SYNOVIAL CRYSTALS TUBE: SIGNIFICANT CHANGE UP
TOTAL NUCLEATED CELL COUNT, BODY FLUID: SIGNIFICANT CHANGE UP /UL
TOTAL NUCLEATED CELL COUNT, BODY FLUID: SIGNIFICANT CHANGE UP /UL
TUBE TYPE: SIGNIFICANT CHANGE UP
TUBE TYPE: SIGNIFICANT CHANGE UP
UROBILINOGEN FLD QL: 0.2 E.U./DL — SIGNIFICANT CHANGE UP
WBC # BLD: 13.24 K/UL — HIGH (ref 3.8–10.5)
WBC # FLD AUTO: 13.24 K/UL — HIGH (ref 3.8–10.5)

## 2020-07-16 PROCEDURE — 73562 X-RAY EXAM OF KNEE 3: CPT | Mod: 26,50

## 2020-07-16 PROCEDURE — 73552 X-RAY EXAM OF FEMUR 2/>: CPT | Mod: 26,RT

## 2020-07-16 PROCEDURE — 99285 EMERGENCY DEPT VISIT HI MDM: CPT

## 2020-07-16 PROCEDURE — 71045 X-RAY EXAM CHEST 1 VIEW: CPT | Mod: 26

## 2020-07-16 PROCEDURE — 93010 ELECTROCARDIOGRAM REPORT: CPT

## 2020-07-16 PROCEDURE — 73590 X-RAY EXAM OF LOWER LEG: CPT | Mod: 26,LT

## 2020-07-16 RX ORDER — PIPERACILLIN AND TAZOBACTAM 4; .5 G/20ML; G/20ML
3.38 INJECTION, POWDER, LYOPHILIZED, FOR SOLUTION INTRAVENOUS EVERY 6 HOURS
Refills: 0 | Status: DISCONTINUED | OUTPATIENT
Start: 2020-07-17 | End: 2020-07-17

## 2020-07-16 RX ORDER — SODIUM CHLORIDE 9 MG/ML
500 INJECTION INTRAMUSCULAR; INTRAVENOUS; SUBCUTANEOUS ONCE
Refills: 0 | Status: COMPLETED | OUTPATIENT
Start: 2020-07-16 | End: 2020-07-16

## 2020-07-16 RX ORDER — ONDANSETRON 8 MG/1
4 TABLET, FILM COATED ORAL EVERY 6 HOURS
Refills: 0 | Status: DISCONTINUED | OUTPATIENT
Start: 2020-07-16 | End: 2020-07-17

## 2020-07-16 RX ORDER — MAGNESIUM HYDROXIDE 400 MG/1
30 TABLET, CHEWABLE ORAL DAILY
Refills: 0 | Status: DISCONTINUED | OUTPATIENT
Start: 2020-07-16 | End: 2020-07-17

## 2020-07-16 RX ORDER — VANCOMYCIN HCL 1 G
1250 VIAL (EA) INTRAVENOUS EVERY 12 HOURS
Refills: 0 | Status: DISCONTINUED | OUTPATIENT
Start: 2020-07-17 | End: 2020-07-17

## 2020-07-16 RX ORDER — ACETAMINOPHEN 500 MG
650 TABLET ORAL EVERY 6 HOURS
Refills: 0 | Status: DISCONTINUED | OUTPATIENT
Start: 2020-07-16 | End: 2020-07-17

## 2020-07-16 RX ORDER — DEXTROSE 50 % IN WATER 50 %
25 SYRINGE (ML) INTRAVENOUS ONCE
Refills: 0 | Status: DISCONTINUED | OUTPATIENT
Start: 2020-07-16 | End: 2020-07-17

## 2020-07-16 RX ORDER — DEXTROSE 50 % IN WATER 50 %
12.5 SYRINGE (ML) INTRAVENOUS ONCE
Refills: 0 | Status: DISCONTINUED | OUTPATIENT
Start: 2020-07-16 | End: 2020-07-17

## 2020-07-16 RX ORDER — HYDROMORPHONE HYDROCHLORIDE 2 MG/ML
2 INJECTION INTRAMUSCULAR; INTRAVENOUS; SUBCUTANEOUS EVERY 4 HOURS
Refills: 0 | Status: DISCONTINUED | OUTPATIENT
Start: 2020-07-16 | End: 2020-07-17

## 2020-07-16 RX ORDER — GLUCAGON INJECTION, SOLUTION 0.5 MG/.1ML
1 INJECTION, SOLUTION SUBCUTANEOUS ONCE
Refills: 0 | Status: DISCONTINUED | OUTPATIENT
Start: 2020-07-16 | End: 2020-07-17

## 2020-07-16 RX ORDER — PIPERACILLIN AND TAZOBACTAM 4; .5 G/20ML; G/20ML
3.38 INJECTION, POWDER, LYOPHILIZED, FOR SOLUTION INTRAVENOUS ONCE
Refills: 0 | Status: COMPLETED | OUTPATIENT
Start: 2020-07-16 | End: 2020-07-16

## 2020-07-16 RX ORDER — SENNA PLUS 8.6 MG/1
2 TABLET ORAL AT BEDTIME
Refills: 0 | Status: DISCONTINUED | OUTPATIENT
Start: 2020-07-16 | End: 2020-07-17

## 2020-07-16 RX ORDER — HYDROMORPHONE HYDROCHLORIDE 2 MG/ML
0.5 INJECTION INTRAMUSCULAR; INTRAVENOUS; SUBCUTANEOUS EVERY 4 HOURS
Refills: 0 | Status: DISCONTINUED | OUTPATIENT
Start: 2020-07-16 | End: 2020-07-17

## 2020-07-16 RX ORDER — HYDROMORPHONE HYDROCHLORIDE 2 MG/ML
0.5 INJECTION INTRAMUSCULAR; INTRAVENOUS; SUBCUTANEOUS ONCE
Refills: 0 | Status: DISCONTINUED | OUTPATIENT
Start: 2020-07-16 | End: 2020-07-16

## 2020-07-16 RX ORDER — DEXTROSE 50 % IN WATER 50 %
15 SYRINGE (ML) INTRAVENOUS ONCE
Refills: 0 | Status: DISCONTINUED | OUTPATIENT
Start: 2020-07-16 | End: 2020-07-17

## 2020-07-16 RX ORDER — VANCOMYCIN HCL 1 G
1000 VIAL (EA) INTRAVENOUS ONCE
Refills: 0 | Status: COMPLETED | OUTPATIENT
Start: 2020-07-16 | End: 2020-07-16

## 2020-07-16 RX ORDER — POLYETHYLENE GLYCOL 3350 17 G/17G
17 POWDER, FOR SOLUTION ORAL DAILY
Refills: 0 | Status: DISCONTINUED | OUTPATIENT
Start: 2020-07-16 | End: 2020-07-17

## 2020-07-16 RX ORDER — INSULIN LISPRO 100/ML
VIAL (ML) SUBCUTANEOUS AT BEDTIME
Refills: 0 | Status: DISCONTINUED | OUTPATIENT
Start: 2020-07-16 | End: 2020-07-17

## 2020-07-16 RX ORDER — SODIUM CHLORIDE 9 MG/ML
1000 INJECTION, SOLUTION INTRAVENOUS
Refills: 0 | Status: DISCONTINUED | OUTPATIENT
Start: 2020-07-16 | End: 2020-07-17

## 2020-07-16 RX ORDER — SODIUM CHLORIDE 9 MG/ML
1000 INJECTION INTRAMUSCULAR; INTRAVENOUS; SUBCUTANEOUS
Refills: 0 | Status: DISCONTINUED | OUTPATIENT
Start: 2020-07-16 | End: 2020-07-17

## 2020-07-16 RX ORDER — ACETAMINOPHEN 500 MG
650 TABLET ORAL ONCE
Refills: 0 | Status: COMPLETED | OUTPATIENT
Start: 2020-07-16 | End: 2020-07-16

## 2020-07-16 RX ORDER — HYDROMORPHONE HYDROCHLORIDE 2 MG/ML
4 INJECTION INTRAMUSCULAR; INTRAVENOUS; SUBCUTANEOUS EVERY 4 HOURS
Refills: 0 | Status: DISCONTINUED | OUTPATIENT
Start: 2020-07-16 | End: 2020-07-17

## 2020-07-16 RX ORDER — INSULIN LISPRO 100/ML
VIAL (ML) SUBCUTANEOUS
Refills: 0 | Status: DISCONTINUED | OUTPATIENT
Start: 2020-07-16 | End: 2020-07-17

## 2020-07-16 RX ADMIN — HYDROMORPHONE HYDROCHLORIDE 0.5 MILLIGRAM(S): 2 INJECTION INTRAMUSCULAR; INTRAVENOUS; SUBCUTANEOUS at 22:05

## 2020-07-16 RX ADMIN — SODIUM CHLORIDE 500 MILLILITER(S): 9 INJECTION INTRAMUSCULAR; INTRAVENOUS; SUBCUTANEOUS at 20:59

## 2020-07-16 RX ADMIN — Medication 650 MILLIGRAM(S): at 23:09

## 2020-07-16 RX ADMIN — Medication 650 MILLIGRAM(S): at 23:36

## 2020-07-16 RX ADMIN — PIPERACILLIN AND TAZOBACTAM 200 GRAM(S): 4; .5 INJECTION, POWDER, LYOPHILIZED, FOR SOLUTION INTRAVENOUS at 21:28

## 2020-07-16 RX ADMIN — PIPERACILLIN AND TAZOBACTAM 3.38 GRAM(S): 4; .5 INJECTION, POWDER, LYOPHILIZED, FOR SOLUTION INTRAVENOUS at 22:05

## 2020-07-16 RX ADMIN — Medication 650 MILLIGRAM(S): at 20:45

## 2020-07-16 RX ADMIN — SODIUM CHLORIDE 500 MILLILITER(S): 9 INJECTION INTRAMUSCULAR; INTRAVENOUS; SUBCUTANEOUS at 19:57

## 2020-07-16 RX ADMIN — Medication 250 MILLIGRAM(S): at 22:21

## 2020-07-16 RX ADMIN — Medication 1: at 23:09

## 2020-07-16 RX ADMIN — HYDROMORPHONE HYDROCHLORIDE 0.5 MILLIGRAM(S): 2 INJECTION INTRAMUSCULAR; INTRAVENOUS; SUBCUTANEOUS at 21:28

## 2020-07-16 RX ADMIN — Medication 650 MILLIGRAM(S): at 19:56

## 2020-07-16 NOTE — H&P ADULT - ATTENDING COMMENTS
Patient seen by me. Known to me for R TKA Oct 2019 complicated by acute PJI, chronic wound healing problems with development of chronic polymicrobial infection. Wound eventually healed with STSG and function was ok until this past Saturday when he had an explosion of pain and swelling. Presented to ED borderline septic; gross pus aspirated from knee. Active non-healing ulcer left leg; history of multiple prior diabetic ulcers to bilateral ankles and feet. Has been in and out of various hospitals for traumas and infections; has lost 70-75 pounds in the last 9 months. Multiple medical comorbidities.    Right knee with healed midline incision including very thin area of matted granulation tissue, stellate in shape, about 10x5cm at superior aspect. Painful ROM. Stable liv/teodoro. Feet cold with no palpable pulse. Decreased sensibility to bilateral legs and feet.    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 We discussed surgical options. I think that the best option for now is to move forward with I&D and polyethylene exchange with careful wound care and likely lifelong chronic suppressive antibiotics. Multistage revision was also discussed; however, I think that he is more likely to have recurrent wound problems with repetitive larger surgeries and he is not a good candidate for flap coverage. We discussed the possible need for amputation if he continues to have wound and infection problems with this knee. He verbalized good understanding.    Will proceed with I&D, poly exchange, possible repeat VAC this afternoon pending OR availability.

## 2020-07-16 NOTE — H&P ADULT - NSHPLABSRESULTS_GEN_ALL_CORE
LABS:                        13.1   13.24 )-----------( 366      ( 16 Jul 2020 19:46 )             43.2     16 Jul 2020 19:46    128    |  89     |  25     ----------------------------<  324    4.5     |  24     |  0.89     Ca    9.2        16 Jul 2020 19:46    TPro  7.2    /  Alb  3.4    /  TBili  0.9    /  DBili  x      /  AST  35     /  ALT  34     /  AlkPhos  130    16 Jul 2020 19:46    PT/INR - ( 16 Jul 2020 19:46 )   PT: 15.2 sec;   INR: 1.28          PTT - ( 16 Jul 2020 19:46 )  PTT:24.3 sec

## 2020-07-16 NOTE — ED PROVIDER NOTE - PROGRESS NOTE DETAILS
Klep[place]: elevated wbc, inflammatory markers. Hyperglycemia w/ normal AG/bicarb, clinically not DKA. Ortho to tap knee, then immediate abx. HR slightly worse, holding additional IVF given hx of CHF.

## 2020-07-16 NOTE — H&P ADULT - ASSESSMENT
65 M presenting w new onset knee pain in setting of ulcer debridements likely septic knee  - Admit to ortho  - tele/monitor  - NPO/IVF overnight  - sliding scale  - Pain control  - aspiration culture follow   - IV abx  - medicine consult for clearance  - pending covid swab   - AM labs
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
## 2020-07-16 NOTE — ED ADULT TRIAGE NOTE - CHIEF COMPLAINT QUOTE
Pt CO Right knee pain and swelling x 2 days.  Pt states "The knee was replaced in October and I'm concerned it's septic."  low grade fever noted in triage.  Pt denies falls, trauma, N/V/D, SOB, CP, Dizziness.

## 2020-07-16 NOTE — H&P ADULT - NSHPPHYSICALEXAM_GEN_ALL_CORE
General: well appearing    RLE:   swelling noted about the knee  no erythema appreciated  old incision healing with no signs of infection   ROM 10-30 w pain at extremes  thready DP pulse  decreased sensation about the foot secondary to diabetes   firing EHL/FHL/TA/GA

## 2020-07-16 NOTE — ED ADULT NURSE NOTE - PAIN RATING/NUMBER SCALE (0-10): ACTIVITY
Assessment  1  Benign essential hypertension (401 1) (I10)   2  BMI 25 0-25 9,adult (V85 21) (Z68 25)   3  [place] cardiac risk >20% in next 10 years (V49 89) (Z91 89)   4  Acute URI (465 9) (J06 9)   5  Prostate cancer screening (V76 44) (Z12 5)   6  Screening for cardiovascular, respiratory, and genitourinary diseases   (V81 2,V81 4,V81 6) (Z13 6,Z13 83,Z13 89)   7  Screening for diabetes mellitus (DM) (V77 1) (Z13 1)   8  Encounter for preventive health examination (V70 0) (Z00 00)    Plan  Acute URI    · Zithromax Z-Sagar 250 MG Oral Tablet (Azithromycin); [address] [place] [address] [place] [id number] to (Last Rx:17Apr2017)  Requested for: 17Apr2017 [place] CVD - Heart Age 68 Years     [place] CVD - Normal 18 5 %     [place] 7

## 2020-07-16 NOTE — ED ADULT NURSE NOTE - OBJECTIVE STATEMENT
Pt presents with pain to the right knee and difficulty ambulating over the last 3 days. PT reports hx of right knee replacement in 10/19 requiring admission and washout in march. Pt also reports chronic wound to right shin. PT reports hes currently on cipro at home. PT presents rectally febrile to 101.

## 2020-07-16 NOTE — ED ADULT NURSE NOTE - NSIMPLEMENTINTERV_GEN_ALL_ED
Implemented All Universal Safety Interventions:  [place] to call system. Call bell, personal items and telephone within reach. Instruct patient to call for assistance. Room bathroom lighting operational. Non-slip footwear when patient is off stretcher. Physically safe environment: no spills, clutter or unnecessary equipment. Stretcher in lowest position, wheels locked, appropriate side rails in place.

## 2020-07-16 NOTE — ED PROVIDER NOTE - OBJECTIVE STATEMENT
65M PMH HTN, HLD, CAD s/p CABG, HRrEF (EFof 25-30% 02/2020) s/p [facility] BiV placement, DM, pHTN, Celiac disease, diverticulitis, R total knee replacement (10/2019) c/b infection, R hip fracture s/p ORIF (02/2020), admitted in march for sepsis/UTI/bacteremia/cholesystitis/metabolic encephalopathy/MOISES, L foot OM now p/w R knee pain. Pt has 2-3d of worsening knee pain, to the point he now cant ambulate. This is the knee that had prior infection. Pt also has chronic L shin wound, ?debrided by Dr. Malloy ~2w ago. Currently on oral cipro. Denies f/c, HA, SOB/CP, URI symptoms, NVD, abd pain, urinary 65M PMH HTN, HLD, CAD s/p CABG, HRrEF (EFof 25-30% 02/2020) s/p [facility] BiV placement, DM, pHTN, Celiac disease, diverticulitis, R total knee replacement (10/2019) c/b infection, R hip fracture s/p ORIF (02/2020), admitted in march for sepsis/UTI/bacteremia/cholesystitis/metabolic encephalopathy/MOISES, L foot OM now p/w R knee pain. Pt has 2-3d of worsening knee pain, to the point he now cant ambulate. This is the knee that had prior infection. Pt also has chronic L shin wound, ?debrided by Dr. Malloy ~2w ago. Currently on oral cipro. Denies f/c, HA, SOB/CP, URI symptoms, NVD, abd pain, urinary complaints, rashes, black/bloody stool. States he spoke w/ ortho Dr. doshi who referred to ED.

## 2020-07-16 NOTE — ED PROVIDER NOTE - PHYSICAL EXAMINATION
L shin ulcer to bone ~8x5cm. clean edges. dry. no surrounding erythema/warmth.   R knee: +swelling ttp and decreased ROM. FROM other joints. weakly palpable b/l DP pulses (pt states this is his baseline). normal temp to distal b/l LE.

## 2020-07-16 NOTE — ED PROVIDER NOTE - CLINICAL SUMMARY MEDICAL DECISION MAKING FREE TEXT BOX
65M PMH HTN, HLD, CAD s/p CABG, HRrEF (EFof 25-30% 02/2020) s/p [facility] BiV placement, DM, pHTN, Celiac disease, diverticulitis, R total knee replacement (10/2019) c/b infection, R hip fracture s/p ORIF (02/2020), admitted in march for sepsis/UTI/bacteremia/cholesystitis/metabolic encephalopathy/MOISES, L foot OM now p/w R knee pain. Pt has 2-3d of worsening knee pain, to the point he now cant ambulate. This is the knee that had prior infection. Pt also has chronic L shin wound, ?debrided by Dr. Malloy ~2w ago. Currently on oral cipro. No other systemic symptoms. Febrile, other vitals wnl. Exam as above.  ddx: Concern for septic joint.  sepsis labs and w/u. d/w ortho, requesting b/l XR. Also requesting holding on abx for now. As pt is well appearing and stable BP/HR will hold on abx for now.

## 2020-07-16 NOTE — ED ADULT NURSE NOTE - PSH
History of open reduction and internal fixation (ORIF) procedure    Other postprocedural status  Fixation hardware in foot LEFT  S/P CABG x 1  2018  S/P TKR (total knee replacement), right  with infection Mrsa   per pt he was cleared from MRSA infection  Stented coronary artery  10/18 heart attack  INFERIOR WALL MI  Surgery, elective  Right shoulder  Surgery, elective  right knee wound debridement

## 2020-07-17 ENCOUNTER — APPOINTMENT (OUTPATIENT)
Dept: VASCULAR SURGERY | Facility: CLINIC | Age: 65
End: 2020-07-17

## 2020-07-17 DIAGNOSIS — I10 ESSENTIAL (PRIMARY) HYPERTENSION: ICD-10-CM

## 2020-07-17 DIAGNOSIS — R78.81 BACTEREMIA: ICD-10-CM

## 2020-07-17 DIAGNOSIS — A41.9 SEPSIS, UNSPECIFIED ORGANISM: ICD-10-CM

## 2020-07-17 DIAGNOSIS — Z95.1 PRESENCE OF AORTOCORONARY BYPASS GRAFT: ICD-10-CM

## 2020-07-17 DIAGNOSIS — Z01.818 ENCOUNTER FOR OTHER PREPROCEDURAL EXAMINATION: ICD-10-CM

## 2020-07-17 DIAGNOSIS — M00.9 PYOGENIC ARTHRITIS, UNSPECIFIED: ICD-10-CM

## 2020-07-17 DIAGNOSIS — E78.1 PURE HYPERGLYCERIDEMIA: ICD-10-CM

## 2020-07-17 DIAGNOSIS — E10.9 TYPE 1 DIABETES MELLITUS WITHOUT COMPLICATIONS: ICD-10-CM

## 2020-07-17 DIAGNOSIS — E10.42 TYPE 1 DIABETES MELLITUS WITH DIABETIC POLYNEUROPATHY: ICD-10-CM

## 2020-07-17 DIAGNOSIS — Z86.2 PERSONAL HISTORY OF DISEASES OF THE BLOOD AND BLOOD-FORMING ORGANS AND CERTAIN DISORDERS INVOLVING THE IMMUNE MECHANISM: ICD-10-CM

## 2020-07-17 DIAGNOSIS — I25.10 ATHEROSCLEROTIC HEART DISEASE OF NATIVE CORONARY ARTERY WITHOUT ANGINA PECTORIS: ICD-10-CM

## 2020-07-17 LAB
A1C WITH ESTIMATED AVERAGE GLUCOSE RESULT: 8.9 % — HIGH (ref 4–5.6)
ANION GAP SERPL CALC-SCNC: 18 MMOL/L — HIGH (ref 5–17)
BUN SERPL-MCNC: 20 MG/DL — SIGNIFICANT CHANGE UP (ref 7–23)
CALCIUM SERPL-MCNC: 9.4 MG/DL — SIGNIFICANT CHANGE UP (ref 8.4–10.5)
CHLORIDE SERPL-SCNC: 93 MMOL/L — LOW (ref 96–108)
CO2 SERPL-SCNC: 21 MMOL/L — LOW (ref 22–31)
CREAT SERPL-MCNC: 0.74 MG/DL — SIGNIFICANT CHANGE UP (ref 0.5–1.3)
CRP SERPL-MCNC: 24.25 MG/DL — HIGH (ref 0–0.4)
CULTURE RESULTS: NO GROWTH — SIGNIFICANT CHANGE UP
ERYTHROCYTE [SEDIMENTATION RATE] IN BLOOD: 100 MM/HR — HIGH
ESTIMATED AVERAGE GLUCOSE: 209 MG/DL — HIGH (ref 68–114)
GLUCOSE BLDC GLUCOMTR-MCNC: 179 MG/DL — HIGH (ref 70–99)
GLUCOSE BLDC GLUCOMTR-MCNC: 209 MG/DL — HIGH (ref 70–99)
GLUCOSE BLDC GLUCOMTR-MCNC: 213 MG/DL — HIGH (ref 70–99)
GLUCOSE BLDC GLUCOMTR-MCNC: 230 MG/DL — HIGH (ref 70–99)
GLUCOSE BLDC GLUCOMTR-MCNC: 417 MG/DL — HIGH (ref 70–99)
GLUCOSE SERPL-MCNC: 211 MG/DL — HIGH (ref 70–99)
GRAM STN FLD: SIGNIFICANT CHANGE UP
HCT VFR BLD CALC: 37.8 % — LOW (ref 39–50)
HGB BLD-MCNC: 11.6 G/DL — LOW (ref 13–17)
MAGNESIUM SERPL-MCNC: 2.2 MG/DL — SIGNIFICANT CHANGE UP (ref 1.6–2.6)
MCHC RBC-ENTMCNC: 24 PG — LOW (ref 27–34)
MCHC RBC-ENTMCNC: 30.7 GM/DL — LOW (ref 32–36)
MCV RBC AUTO: 78.3 FL — LOW (ref 80–100)
METHOD TYPE: SIGNIFICANT CHANGE UP
MRSA SPEC QL CULT: SIGNIFICANT CHANGE UP
NIGHT BLUE STAIN TISS: SIGNIFICANT CHANGE UP
NIGHT BLUE STAIN TISS: SIGNIFICANT CHANGE UP
NRBC # BLD: 0 /100 WBCS — SIGNIFICANT CHANGE UP (ref 0–0)
PHOSPHATE SERPL-MCNC: 3.6 MG/DL — SIGNIFICANT CHANGE UP (ref 2.5–4.5)
PLATELET # BLD AUTO: 301 K/UL — SIGNIFICANT CHANGE UP (ref 150–400)
POTASSIUM SERPL-MCNC: 4.6 MMOL/L — SIGNIFICANT CHANGE UP (ref 3.5–5.3)
POTASSIUM SERPL-SCNC: 4.6 MMOL/L — SIGNIFICANT CHANGE UP (ref 3.5–5.3)
RBC # BLD: 4.83 M/UL — SIGNIFICANT CHANGE UP (ref 4.2–5.8)
RBC # FLD: 17.3 % — HIGH (ref 10.3–14.5)
SODIUM SERPL-SCNC: 132 MMOL/L — LOW (ref 135–145)
SPECIMEN SOURCE: SIGNIFICANT CHANGE UP
WBC # BLD: 14.12 K/UL — HIGH (ref 3.8–10.5)
WBC # FLD AUTO: 14.12 K/UL — HIGH (ref 3.8–10.5)

## 2020-07-17 PROCEDURE — 93306 TTE W/DOPPLER COMPLETE: CPT | Mod: 26

## 2020-07-17 PROCEDURE — 29871 ARTHRS KNEE SURG FOR INFCTJ: CPT | Mod: RT

## 2020-07-17 PROCEDURE — 99233 SBSQ HOSP IP/OBS HIGH 50: CPT

## 2020-07-17 PROCEDURE — 93970 EXTREMITY STUDY: CPT | Mod: 26

## 2020-07-17 PROCEDURE — 99223 1ST HOSP IP/OBS HIGH 75: CPT | Mod: GC

## 2020-07-17 RX ORDER — SODIUM CHLORIDE 9 MG/ML
1000 INJECTION, SOLUTION INTRAVENOUS
Refills: 0 | Status: DISCONTINUED | OUTPATIENT
Start: 2020-07-17 | End: 2020-07-22

## 2020-07-17 RX ORDER — DULOXETINE HYDROCHLORIDE 30 MG/1
90 CAPSULE, DELAYED RELEASE ORAL DAILY
Refills: 0 | Status: DISCONTINUED | OUTPATIENT
Start: 2020-07-17 | End: 2020-07-22

## 2020-07-17 RX ORDER — SODIUM CHLORIDE 9 MG/ML
1000 INJECTION, SOLUTION INTRAVENOUS
Refills: 0 | Status: DISCONTINUED | OUTPATIENT
Start: 2020-07-17 | End: 2020-07-17

## 2020-07-17 RX ORDER — METOPROLOL TARTRATE 50 MG
25 TABLET ORAL DAILY
Refills: 0 | Status: DISCONTINUED | OUTPATIENT
Start: 2020-07-17 | End: 2020-07-22

## 2020-07-17 RX ORDER — ATORVASTATIN CALCIUM 80 MG/1
40 TABLET, FILM COATED ORAL AT BEDTIME
Refills: 0 | Status: DISCONTINUED | OUTPATIENT
Start: 2020-07-17 | End: 2020-07-22

## 2020-07-17 RX ORDER — NYSTATIN CREAM 100000 [USP'U]/G
1 CREAM TOPICAL
Refills: 0 | Status: DISCONTINUED | OUTPATIENT
Start: 2020-07-17 | End: 2020-07-22

## 2020-07-17 RX ORDER — ACETAMINOPHEN 500 MG
975 TABLET ORAL EVERY 8 HOURS
Refills: 0 | Status: DISCONTINUED | OUTPATIENT
Start: 2020-07-17 | End: 2020-07-22

## 2020-07-17 RX ORDER — TAMSULOSIN HYDROCHLORIDE 0.4 MG/1
0.4 CAPSULE ORAL AT BEDTIME
Refills: 0 | Status: DISCONTINUED | OUTPATIENT
Start: 2020-07-17 | End: 2020-07-20

## 2020-07-17 RX ORDER — PIPERACILLIN AND TAZOBACTAM 4; .5 G/20ML; G/20ML
3.38 INJECTION, POWDER, LYOPHILIZED, FOR SOLUTION INTRAVENOUS EVERY 6 HOURS
Refills: 0 | Status: DISCONTINUED | OUTPATIENT
Start: 2020-07-17 | End: 2020-07-18

## 2020-07-17 RX ORDER — DEXTROSE 50 % IN WATER 50 %
15 SYRINGE (ML) INTRAVENOUS ONCE
Refills: 0 | Status: DISCONTINUED | OUTPATIENT
Start: 2020-07-17 | End: 2020-07-22

## 2020-07-17 RX ORDER — BACITRACIN ZINC 500 UNIT/G
1 OINTMENT IN PACKET (EA) TOPICAL DAILY
Refills: 0 | Status: DISCONTINUED | OUTPATIENT
Start: 2020-07-17 | End: 2020-07-22

## 2020-07-17 RX ORDER — DEXTROSE 50 % IN WATER 50 %
25 SYRINGE (ML) INTRAVENOUS ONCE
Refills: 0 | Status: DISCONTINUED | OUTPATIENT
Start: 2020-07-17 | End: 2020-07-22

## 2020-07-17 RX ORDER — PRASUGREL 5 MG/1
10 TABLET, FILM COATED ORAL DAILY
Refills: 0 | Status: DISCONTINUED | OUTPATIENT
Start: 2020-07-18 | End: 2020-07-22

## 2020-07-17 RX ORDER — INSULIN LISPRO 100/ML
VIAL (ML) SUBCUTANEOUS
Refills: 0 | Status: DISCONTINUED | OUTPATIENT
Start: 2020-07-17 | End: 2020-07-22

## 2020-07-17 RX ORDER — GLUCAGON INJECTION, SOLUTION 0.5 MG/.1ML
1 INJECTION, SOLUTION SUBCUTANEOUS ONCE
Refills: 0 | Status: DISCONTINUED | OUTPATIENT
Start: 2020-07-17 | End: 2020-07-22

## 2020-07-17 RX ORDER — DEXTROSE 50 % IN WATER 50 %
12.5 SYRINGE (ML) INTRAVENOUS ONCE
Refills: 0 | Status: DISCONTINUED | OUTPATIENT
Start: 2020-07-17 | End: 2020-07-22

## 2020-07-17 RX ORDER — OXYCODONE HYDROCHLORIDE 5 MG/1
5 TABLET ORAL EVERY 4 HOURS
Refills: 0 | Status: DISCONTINUED | OUTPATIENT
Start: 2020-07-17 | End: 2020-07-22

## 2020-07-17 RX ORDER — OXYCODONE HYDROCHLORIDE 5 MG/1
10 TABLET ORAL EVERY 4 HOURS
Refills: 0 | Status: DISCONTINUED | OUTPATIENT
Start: 2020-07-17 | End: 2020-07-22

## 2020-07-17 RX ORDER — POLYETHYLENE GLYCOL 3350 17 G/17G
17 POWDER, FOR SOLUTION ORAL DAILY
Refills: 0 | Status: DISCONTINUED | OUTPATIENT
Start: 2020-07-17 | End: 2020-07-22

## 2020-07-17 RX ORDER — HYDROMORPHONE HYDROCHLORIDE 2 MG/ML
0.5 INJECTION INTRAMUSCULAR; INTRAVENOUS; SUBCUTANEOUS
Refills: 0 | Status: DISCONTINUED | OUTPATIENT
Start: 2020-07-17 | End: 2020-07-22

## 2020-07-17 RX ORDER — VANCOMYCIN HCL 1 G
1250 VIAL (EA) INTRAVENOUS EVERY 12 HOURS
Refills: 0 | Status: DISCONTINUED | OUTPATIENT
Start: 2020-07-17 | End: 2020-07-20

## 2020-07-17 RX ORDER — MAGNESIUM HYDROXIDE 400 MG/1
30 TABLET, CHEWABLE ORAL DAILY
Refills: 0 | Status: DISCONTINUED | OUTPATIENT
Start: 2020-07-17 | End: 2020-07-22

## 2020-07-17 RX ORDER — ONDANSETRON 8 MG/1
4 TABLET, FILM COATED ORAL EVERY 6 HOURS
Refills: 0 | Status: DISCONTINUED | OUTPATIENT
Start: 2020-07-17 | End: 2020-07-22

## 2020-07-17 RX ORDER — ASPIRIN/CALCIUM CARB/MAGNESIUM 324 MG
81 TABLET ORAL
Refills: 0 | Status: DISCONTINUED | OUTPATIENT
Start: 2020-07-18 | End: 2020-07-22

## 2020-07-17 RX ORDER — PANTOPRAZOLE SODIUM 20 MG/1
40 TABLET, DELAYED RELEASE ORAL
Refills: 0 | Status: DISCONTINUED | OUTPATIENT
Start: 2020-07-17 | End: 2020-07-22

## 2020-07-17 RX ORDER — COLLAGENASE CLOSTRIDIUM HIST. 250 UNIT/G
1 OINTMENT (GRAM) TOPICAL DAILY
Refills: 0 | Status: DISCONTINUED | OUTPATIENT
Start: 2020-07-17 | End: 2020-07-22

## 2020-07-17 RX ORDER — DIGOXIN 250 MCG
0.12 TABLET ORAL DAILY
Refills: 0 | Status: DISCONTINUED | OUTPATIENT
Start: 2020-07-17 | End: 2020-07-22

## 2020-07-17 RX ORDER — SENNA PLUS 8.6 MG/1
2 TABLET ORAL AT BEDTIME
Refills: 0 | Status: DISCONTINUED | OUTPATIENT
Start: 2020-07-17 | End: 2020-07-22

## 2020-07-17 RX ADMIN — PIPERACILLIN AND TAZOBACTAM 200 GRAM(S): 4; .5 INJECTION, POWDER, LYOPHILIZED, FOR SOLUTION INTRAVENOUS at 18:23

## 2020-07-17 RX ADMIN — Medication 4: at 16:52

## 2020-07-17 RX ADMIN — SODIUM CHLORIDE 75 MILLILITER(S): 9 INJECTION INTRAMUSCULAR; INTRAVENOUS; SUBCUTANEOUS at 00:00

## 2020-07-17 RX ADMIN — Medication 166.67 MILLIGRAM(S): at 22:34

## 2020-07-17 RX ADMIN — PIPERACILLIN AND TAZOBACTAM 200 GRAM(S): 4; .5 INJECTION, POWDER, LYOPHILIZED, FOR SOLUTION INTRAVENOUS at 09:26

## 2020-07-17 RX ADMIN — TAMSULOSIN HYDROCHLORIDE 0.4 MILLIGRAM(S): 0.4 CAPSULE ORAL at 22:32

## 2020-07-17 RX ADMIN — Medication 650 MILLIGRAM(S): at 08:18

## 2020-07-17 RX ADMIN — NYSTATIN CREAM 1 APPLICATION(S): 100000 CREAM TOPICAL at 22:34

## 2020-07-17 RX ADMIN — ATORVASTATIN CALCIUM 40 MILLIGRAM(S): 80 TABLET, FILM COATED ORAL at 22:32

## 2020-07-17 RX ADMIN — Medication 4: at 22:43

## 2020-07-17 RX ADMIN — PIPERACILLIN AND TAZOBACTAM 200 GRAM(S): 4; .5 INJECTION, POWDER, LYOPHILIZED, FOR SOLUTION INTRAVENOUS at 02:55

## 2020-07-17 RX ADMIN — Medication 4: at 06:38

## 2020-07-17 RX ADMIN — Medication 650 MILLIGRAM(S): at 06:38

## 2020-07-17 NOTE — CONSULT NOTE ADULT - SUBJECTIVE AND OBJECTIVE BOX
Patient is a 65y old  Male who presents with a chief complaint of     HPI:  65M PMH HTN, HLD, CAD s/p CABG, HRrEF (EFof 25-30% 2020) s/p [facility] BiV placement, DM, pHTN, Celiac disease, diverticulitis, R total knee replacement (10/2019) c/b infection, R hip fracture s/p ORIF (2020). states pain started saurday and has gradually worsened. unable to ambulate on affected side. reports some swelling about the new. no new wounds on affected side. however has had 2 debridements over last 2 weeks by vascular for a diabetic ulcer on the contralateral side. denies any fevers, chills, malaise. states prior to saturday was ambulating just fine. (2020 22:51)      PAST MEDICAL & SURGICAL HISTORY:  Diabetic neuropathy  STEMI (ST elevation myocardial infarction)  Diverticulitis  MRSA bacteremia  History of celiac disease  CHF (congestive heart failure): EF ~ 25%  HTN (hypertension)  Diabetes: on insulin pump  Blood clot due to device, implant, or graft: was on blood thinners  HLD (hyperlipidemia)  Osteoarthritis  Atherosclerosis of coronary artery: CAD (coronary artery disease)  Status post percutaneous transluminal coronary angioplasty: in   History of open reduction and internal fixation (ORIF) procedure  Surgery, elective: Right shoulder  Surgery, elective: right knee wound debridement  S/P TKR (total knee replacement), right: with infection Mrsa   per pt he was cleared from MRSA infection  S/P CABG x 1:   Stented coronary artery: 10/18 heart attack  INFERIOR WALL MI  Other postprocedural status: Fixation hardware in foot LEFT      FAMILY HISTORY:  Family history of allergies: daughter  Family history of heart disease (Mother)      SOCIAL HISTORY:  Smoking Status: [ ] Current, [ ] Former, [x ] Never  Pack Years:    MEDICATIONS:  Pulmonary:    Antimicrobials:  piperacillin/tazobactam IVPB.. 3.375 Gram(s) IV Intermittent every 6 hours  vancomycin  IVPB 1250 milliGRAM(s) IV Intermittent every 12 hours    Anticoagulants:    Onc:    GI/:  aluminum hydroxide/magnesium hydroxide/simethicone Suspension 30 milliLiter(s) Oral four times a day PRN  magnesium hydroxide Suspension 30 milliLiter(s) Oral daily PRN  polyethylene glycol 3350 17 Gram(s) Oral daily  senna 2 Tablet(s) Oral at bedtime PRN    Endocrine:  dextrose 40% Gel 15 Gram(s) Oral once PRN  dextrose 50% Injectable 12.5 Gram(s) IV Push once  dextrose 50% Injectable 25 Gram(s) IV Push once  dextrose 50% Injectable 25 Gram(s) IV Push once  glucagon  Injectable 1 milliGRAM(s) IntraMuscular once PRN  insulin lispro (HumaLOG) corrective regimen sliding scale   SubCutaneous three times a day before meals  insulin lispro (HumaLOG) corrective regimen sliding scale   SubCutaneous at bedtime    Cardiac:    Other Medications:  acetaminophen   Tablet .. 650 milliGRAM(s) Oral every 6 hours  dextrose 5%. 1000 milliLiter(s) IV Continuous <Continuous>  HYDROmorphone   Tablet 2 milliGRAM(s) Oral every 4 hours PRN  HYDROmorphone   Tablet 4 milliGRAM(s) Oral every 4 hours PRN  HYDROmorphone  Injectable 0.5 milliGRAM(s) IV Push every 4 hours PRN  ondansetron Injectable 4 milliGRAM(s) IV Push every 6 hours PRN  sodium chloride 0.9%. 1000 milliLiter(s) IV Continuous <Continuous>      Allergies    ACE inhibitors (Hives)  carvedilol (Other)  enalapril (Hives)  Entresto (Other)    Intolerances        Review of Systems:   •	General: negative  •	Skin/Breast: negative  •	Ophthalmologic: negative  •	ENMT: negative  •	Respiratory and Thorax: negative  •	Cardiovascular: negative  •	Gastrointestinal: negative  •	Genitourinary: negative  •	Musculoskeletal: swelling and painful right knee  •	Neurological: negative  •	Psychiatric: negative  •	Hematology/Lymphatics: negative  •	Endocrine: negative  •	Allergic/Immunologic: negative    Physical Exam:   • Constitutional:	Well-developed, well nourished  • Eyes:	EOMI; PERRL; no drainage or redness  • ENMT:	No oral lesions; no gross abnormalities  • Neck	No bruits; no thyromegaly or nodules  • Breasts:	not examined  • Back:	No deformity or limitation of movement  • Respiratory:	Breath Sounds equal & clear to percussion & auscultation, no accessory muscle use  • Cardiovascular:	Regular rate & rhythm, normal S1, S2; no murmurs, gallops or rubs; no S3, S4  • Gastrointestinal:	Soft, non-tender, no hepatosplenomegaly, normal bowel sounds  • Genitourinary:	not examined  • Rectal: not examined  • Extremities:	No cyanosis, clubbing or edema  • Vascular:	Equal and normal pulses (carotid, femoral, dorsalis pedis)  • Neurologica:l	not examined  • Skin:	No lesions; no rash  • Lymph Nodes:	No lymphadedenopathy  • Musculoskeletal:	right knee joint pain, swelling with hotness to touch no limitation of movement      Vital Signs Last 24 Hrs  T(C): 36.6 (2020 05:34), Max: 38.3 (2020 19:48)  T(F): 97.8 (2020 05:34), Max: 101 (2020 19:48)  HR: 91 (2020 07:08) (91 - 100)  BP: 128/62 (2020 07:08) (109/60 - 131/78)  BP(mean): 88 (2020 07:08) (79 - 88)  RR: 20 (2020 07:08) (16 - 20)  SpO2: 100% (2020 07:08) (96% - 100%)    07-16 @ 07:01  -  -17 @ 07:00  --------------------------------------------------------  IN: 525 mL / OUT: 1110 mL / NET: -585 mL          LABS:      CBC Full  -  ( 2020 07:09 )  WBC Count : 14.12 K/uL  RBC Count : 4.83 M/uL  Hemoglobin : 11.6 g/dL  Hematocrit : 37.8 %  Platelet Count - Automated : 301 K/uL  Mean Cell Volume : 78.3 fl  Mean Cell Hemoglobin : 24.0 pg  Mean Cell Hemoglobin Concentration : 30.7 gm/dL  Auto Neutrophil # : x  Auto Lymphocyte # : x  Auto Monocyte # : x  Auto Eosinophil # : x  Auto Basophil # : x  Auto Neutrophil % : x  Auto Lymphocyte % : x  Auto Monocyte % : x  Auto Eosinophil % : x  Auto Basophil % : x        132<L>  |  93<L>  |  20  ----------------------------<  211<H>  4.6   |  21<L>  |  0.74    Ca    9.4      2020 07:09  Phos  3.6     07-17  Mg     2.2     07-17    TPro  7.2  /  Alb  3.4  /  TBili  0.9  /  DBili  x   /  AST  35  /  ALT  34  /  AlkPhos  130<H>  07-16    PT/INR - ( 2020 19:46 )   PT: 15.2 sec;   INR: 1.28          PTT - ( 2020 19:46 )  PTT:24.3 sec      Urinalysis Basic - ( 2020 19:59 )    Color: Yellow / Appearance: Clear / S.015 / pH: x  Gluc: x / Ketone: 15 mg/dL  / Bili: Negative / Urobili: 0.2 E.U./dL   Blood: x / Protein: Trace mg/dL / Nitrite: NEGATIVE   Leuk Esterase: NEGATIVE / RBC: < 5 /HPF / WBC < 5 /HPF   Sq Epi: x / Non Sq Epi: 0-5 /HPF / Bacteria: Present /HPF      CXR cardiology prominent PVC  EKG RSR paced    < from: Transthoracic Echocardiogram (20 @ 14:37) >  Summary:   1. Left ventricular ejection fraction, by visual estimation, is 25 to 30%.   2. Severely decreased segmental left ventricular systolic function.   3. Moderate to severely increased left ventricular internal cavity size.   4. Mild to moderate mitral valve regurgitation.   5. Mild-moderate tricuspid regurgitation.   6. Mild aortic regurgitation.   7. Estimated pulmonary artery systolic pressure is 51.8 mmHg assuming a right atrial pressure of 15 mmHg, whichis consistent with moderate pulmonary hypertension.    < end of copied text >      RADIOLOGY & ADDITIONAL STUDIES (The following images were personally reviewed):

## 2020-07-17 NOTE — CONSULT NOTE ADULT - PROBLEM SELECTOR RECOMMENDATION 8
discussed with Dr Caldera and he will evaluate the patient preop.  patient had AICS/PPM and need to be evaluated by EP preop.  No evidence of decompensated CHF.  He has PAH group 2

## 2020-07-17 NOTE — CONSULT NOTE ADULT - ASSESSMENT
65 M presenting w new onset knee pain in setting of ulcer debridements likely septic knee. Known Non healing wound in contralateral LE (R)    Recommendations:  - Wound care with hydrogel on the wound starting tomorrow  - No vascular intervention for now  - Continue with follow up at the office once discharged   - discussed with Chief on call  - call /[phone] with questions

## 2020-07-17 NOTE — PACU DISCHARGE NOTE - COMMENTS
pt aao x2.  VSS.  right knee ace bandage c/d/i; right knee hemovac to ss.  lle kerlix dsg c/d/i.  contact precautions maintained.  denies c/o pain.  report given to NICANOR Gibbs on 9 wollman.  pt to go to [phone] via bed on monitor with RN and transport

## 2020-07-17 NOTE — CONSULT NOTE ADULT - PROBLEM SELECTOR RECOMMENDATION 9
follow cultures.  he is on broad spectrum antibiotics.  Follow on intraop cultures.  vanco level The patient's medical condition is optimized for surgery.  There is no contraindication for surgery.  There is no clinical evidence neither of angina, decompensated CHF, arrhthymias, nor valvular disease.   There is no limitation of exercise capacity.  MET is  .  ASA class is .  [place] cardiac risk factor is  .  DVT prophylaxis is indicated.  Pain control.  Early mobilization.  Avoid fluid overload. The patient's medical condition is optimized for surgery.  There is no contraindication for surgery.  There is no clinical evidence neither of angina, decompensated CHF, arrhthymias, nor valvular disease.   There is no limitation of exercise capacity.  MET is  4.  ASA class is3 .  [place] cardiac risk factor is high .  DVT prophylaxis is indicated.  Pain control.  Early mobilization.  Avoid fluid overload.

## 2020-07-17 NOTE — PROGRESS NOTE ADULT - ATTENDING COMMENTS
I've had a series of discussions with him over the course of the morning. I also called Dr. Estes to discuss his past surgical history and consult about future options. 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 He has a history of severe soft tissue compromise over the superior half of his incision and has demonstrated ongoing poor wound healing. He is cachectic and has had severe failure to thrive within the last year. However, the soft tissue envelope is still intact, if tenuous. I think that open surgery would endanger this and would still present little chance for cure of the PJI.     I think that the most reasonable compromise to lower the bacterial load while protecting the soft tissues would be an arthroscopic irrigation and debridement, not an open debridement. I discussed it with him. He understands that this surgery is being undertaken without expectation of cure. However, if we can find an effective chronic suppressive treatment regime, then this may allow him to keep his current knee function and avoid further episodes of bacteremia and sepsis.     Will proceed with the minimally invasive surgery today.

## 2020-07-17 NOTE — PROGRESS NOTE ADULT - SUBJECTIVE AND OBJECTIVE BOX
Ortho Note    Pt comfortable without complaints, pain controlled  Denies CP, SOB, N/V, numbness/tingling     Vital Signs Last 24 Hrs  T(C): 36.6 (07-17-20 @ 05:34), Max: 36.6 (07-17-20 @ 05:34)  T(F): 97.8 (07-17-20 @ 05:34), Max: 97.8 (07-17-20 @ 05:34)  HR: 91 (07-17-20 @ 03:06) (91 - 91)  BP: 109/60 (07-17-20 @ 03:06) (109/60 - 109/60)  BP(mean): 79 (07-17-20 @ 03:06) (79 - 79)  RR: 17 (07-17-20 @ 03:06) (17 - 17)  SpO2: 96% (07-17-20 @ 03:06) (96% - 96%)  AVSS    General: Pt Alert and oriented, NAD  mild swelling  warmth of joint  improved ROM 10-50 with mild/mod pain at extremes  Pulses: palpable DP pulse  Sensation: SILT over distal limb and symmetric to contralateral side  Motor: 5/5 EHL/FHL/TA/GS                          13.1   13.24 )-----------( 366      ( 16 Jul 2020 19:46 )             43.2   16 Jul 2020 19:46    128    |  89     |  25     ----------------------------<  324    4.5     |  24     |  0.89       TPro  7.2    /  Alb  3.4    /  TBili  0.9    /  DBili  x      /  AST  35     /  ALT  34     /  AlkPhos  130    16 Jul 2020 19:46      A/P: 65yMale w R TK nisa-prosthetic infection s/p aspiration cultures pending on vanc and zosyn for broad spec coverage for OR today poly exchange vs total explant pending medical clearance  - Stable  - Pain Control  - DVT ppx: SCDs  - PT, WBS: WBAT  - NPO/IVF  - F/u Cultures  - ID consult pending this AM   - Pending medical clearance    Ortho Pager [phone] Ortho Note    Pt comfortable without complaints, pain controlled  Denies CP, SOB, N/V, numbness/tingling     Vital Signs Last 24 Hrs  T(C): 36.6 (07-17-20 @ 05:34), Max: 36.6 (07-17-20 @ 05:34)  T(F): 97.8 (07-17-20 @ 05:34), Max: 97.8 (07-17-20 @ 05:34)  HR: 91 (07-17-20 @ 03:06) (91 - 91)  BP: 109/60 (07-17-20 @ 03:06) (109/60 - 109/60)  BP(mean): 79 (07-17-20 @ 03:06) (79 - 79)  RR: 17 (07-17-20 @ 03:06) (17 - 17)  SpO2: 96% (07-17-20 @ 03:06) (96% - 96%)  AVSS    General: Pt Alert and oriented, NAD  mild swelling  warmth of joint  improved ROM 10-50 with mild/mod pain at extremes  Pulses: palpable DP pulse  Sensation: SILT over distal limb and symmetric to contralateral side  Motor: 5/5 EHL/FHL/TA/GS                          13.1   13.24 )-----------( 366      ( 16 Jul 2020 19:46 )             43.2   16 Jul 2020 19:46    128    |  89     |  25     ----------------------------<  324    4.5     |  24     |  0.89       TPro  7.2    /  Alb  3.4    /  TBili  0.9    /  DBili  x      /  AST  35     /  ALT  34     /  AlkPhos  130    16 Jul 2020 19:46      A/P: 65yMale w R TK nisa-prosthetic infection s/p aspiration cultures pending on vanc and zosyn for broad spec coverage for OR today   - Stable  - Pain Control  - DVT ppx: SCDs  - PT, WBS: WBAT  - NPO/IVF  - F/u Cultures  - ID consult pending this AM   - Pending medical clearance    Ortho Pager [phone]

## 2020-07-17 NOTE — CONSULT NOTE ADULT - SUBJECTIVE AND OBJECTIVE BOX
HPI:  64 yo M with HTN, hyperlipidemia, type II DM with peripheral neuropathy, CAD s/p MIDCAB ()/VERONICA, HFrEF, AICD (), pulmonary HTN with recurrent R knee PPJI.  He underwent robotic-assisted R TKR on 10/21/19 without complication.  He was readmitted on 19 with dehiscence of his incision.  He had been started on TMP/SX 1 week earlier without improvement.  On , he underwent I&D and polyethylene liner exchange.  OR cultures grew MRSA (S vanc 1.5, clinda, dapto, linezolid, rif and tet/doxy).  He was treated with vancomycin and rifampin.  His LFTs became elevated, rifampin was held.  He was d/jovanni  to complete 6 weeks of vancomycin.  While on therapy, he was admitted for CHF exacerbation (-).  Rifampin was restarted around the d/c but he again was admitted on  for necrotic R TKA wound.  He was seen by Plastics and immobilized.  He was d/jovanni on vanc/rifampin on .  He was transitioned from vancomycin to doxy on 1/15.  On , he underwent wound debridement of skin and subcutaneous tissue/fascia by Vascular.  No pus was identified, a wound vac was placed and he was admitted.  ID recommended two week course of doxy.  He was d/jovanni on .  On , he underwent ORIF R hip for displaced R intertrochanteric hip fracture at [facility].  He received perioperative cefazolin.  During that admission, on , he underwent selective debridement of R knee wound with STSG from R thigh by Burn service.  Debrided tissue was sent for culture and grew Serratia marcescens as well as E coli (both S ceftriaxone). He initially was started on amp/sulbactam.  Changed to clinda X 10 d by ID. His course was c/b episode of delirium and urinary retention requiring Loja.  He had non-sustained VT and underwent AICD placement with pip-tazo periop on , clinda was continued.  On , clinda was d/jovanni and he was given 7 d course of TMP/SX to cover isolates in wound culture.  He was d/jovanni to [facility] with Loja on  but readmitted to [facility] on  for AMS initially thought due to UTI.  He was started on meropenem, which was d/jovanni on .  Urine culture grew Klebsiella pneumonia (S cefazolin, R TMP/SX), blood cultures were negative.  R knee wound had central eschar and eschar at border.  He underwent bedside excisional debridement to dermis.  He was started on ceftriaxone (for urine?).  Course was c/b development of sepsis/elevated LFTs/HIDA c/c acute cholecystitis.  Metronidazole was added.  By He underwent cholecystostomy tube placement by IR on .  By , antibiotics were changed to meropenem.  Bile culture did not grow.  On 3/4, antibiotics were changed to levofloxacin, then moxifloxacin on 3/6 through 3/14.  He also was found to have L 2nd toe ulcer with XRay on 3/16 c/c OM.  He was seen by ID – recommended no Abx.  No MRI done.  He was d/jovanni to rehab on 3/19.  He had I&D of LLE wound on .  Culture sent in wrong media.  He was treated with local care, with clinical worsening.  On , he was started on amox/clav.  On , he underwent I&D of LLE ulcer by Vascular.  Bone culture did not grow.   He was seen for f/u on 7/10 – wound was not healing – was started on cipro, planned as 10 d course.  On , he began having R knee pain and inability to ambulate.  He came to the ED on , where he had T 100.3.  Swelling was noted about the knee.  He had WBC 13.24.  Aspirate was done - 195,100 nucleated cells with 98% PMNs.  He was started on vanc and pip-tazo.  Today, he underwent arthroscopic I&D.  Was found to have purulent joint fluid and inflammatory synovitis.  ID consult for assistance with antibiotic management.      PAST MEDICAL & SURGICAL HISTORY:  Diabetic neuropathy  STEMI (ST elevation myocardial infarction)  Diverticulitis  MRSA bacteremia  History of celiac disease  CHF (congestive heart failure): EF ~ 25%  HTN (hypertension)  Diabetes: on insulin pump  Blood clot due to device, implant, or graft: was on blood thinners  HLD (hyperlipidemia)  Osteoarthritis  Atherosclerosis of coronary artery: CAD (coronary artery disease)  Status post percutaneous transluminal coronary angioplasty: in   History of open reduction and internal fixation (ORIF) procedure  Surgery, elective: Right shoulder  Surgery, elective: right knee wound debridement  S/P TKR (total knee replacement), right: with infection Mrsa   per pt he was cleared from MRSA infection  S/P CABG x 1:   Stented coronary artery: 10/18 heart attack  INFERIOR WALL MI  Other postprocedural status: Fixation hardware in foot LEFT          MEDICATIONS  (STANDING):  atorvastatin 40 milliGRAM(s) Oral at bedtime  BACItracin   Ointment 1 Application(s) Topical daily  collagenase Ointment 1 Application(s) Topical daily  dextrose 5%. 1000 milliLiter(s) (50 mL/Hr) IV Continuous <Continuous>  dextrose 50% Injectable 12.5 Gram(s) IV Push once  dextrose 50% Injectable 25 Gram(s) IV Push once  dextrose 50% Injectable 25 Gram(s) IV Push once  digoxin     Tablet 0.125 milliGRAM(s) Oral daily  DULoxetine 90 milliGRAM(s) Oral daily  insulin lispro (HumaLOG) corrective regimen sliding scale   SubCutaneous Before meals and at bedtime  lactated ringers. 1000 milliLiter(s) (50 mL/Hr) IV Continuous <Continuous>  metoprolol succinate ER 25 milliGRAM(s) Oral daily  nystatin Powder 1 Application(s) Topical two times a day  pantoprazole    Tablet 40 milliGRAM(s) Oral before breakfast  piperacillin/tazobactam IVPB.. 3.375 Gram(s) IV Intermittent every 6 hours  polyethylene glycol 3350 17 Gram(s) Oral daily  tamsulosin Oral Tab/Cap - Peds 0.4 milliGRAM(s) Oral at bedtime  vancomycin  IVPB 1250 milliGRAM(s) IV Intermittent every 12 hours    MEDICATIONS  (PRN):  acetaminophen   Tablet .. 975 milliGRAM(s) Oral every 8 hours PRN Temp greater or equal to 38C (100.4F), Mild Pain (1 - 3)  aluminum hydroxide/magnesium hydroxide/simethicone Suspension 30 milliLiter(s) Oral four times a day PRN Indigestion  dextrose 40% Gel 15 Gram(s) Oral once PRN Blood Glucose LESS THAN 70 milliGRAM(s)/deciliter  glucagon  Injectable 1 milliGRAM(s) IntraMuscular once PRN Glucose LESS THAN 70 milligrams/deciliter  HYDROmorphone  Injectable 0.5 milliGRAM(s) IV Push every 15 minutes PRN Breakthrough Pain  magnesium hydroxide Suspension 30 milliLiter(s) Oral daily PRN Constipation  ondansetron Injectable 4 milliGRAM(s) IV Push every 6 hours PRN Nausea and/or Vomiting  oxyCODONE    IR 5 milliGRAM(s) Oral every 4 hours PRN Moderate Pain (4 - 6)  oxyCODONE    IR 10 milliGRAM(s) Oral every 4 hours PRN Severe Pain (7 - 10)  senna 2 Tablet(s) Oral at bedtime PRN Constipation      Allergies    ACE inhibitors (Hives)  carvedilol (Other)  enalapril (Hives)  Entresto (Other)    Intolerances        SOCIAL HISTORY:  .  Otherwise unobtainable - not answering questions    FAMILY HISTORY:  Family history of allergies: daughter  Family history of heart disease (Mother)      Vital Signs Last 24 Hrs  T(C): 36.3 (2020 13:45), Max: 38.3 (2020 19:48)  T(F): 97.3 (2020 13:45), Max: 101 (2020 19:48)  HR: 84 (2020 17:00) (83 - 100)  BP: 113/64 (2020 17:00) (108/55 - 131/78)  BP(mean): 83 (2020 17:00) (76 - 91)  RR: 14 (2020 17:00) (14 - 22)  SpO2: 100% (2020 17:00) (96% - 100%)    PE:  Chronically ill-appearing, somnolent, O X 1  HEENT:  NC, PERRL, sclerae anicteric, conjunctivae clear.  NC in place.  Sinuses nontender, no nasal exudate.  Unable to evaluate mouth  Neck:  Supple, no adenopathy  Lungs:  Clear to auscultation  Cor:  RRR, S1, S2, no murmur appreciated  Abd:  Symmetric, normoactive BS.  Soft, nontender, no masses, guarding or rebound.  Liver and spleen not enlarged  Extrem:  R thigh donor site well-healed.  R knee wrapped with drain, foot cold.  Ulcer with eschar 2nd distal digit.  Large pretibial ulcer LLE, granular base, clean, no exudate, no surrounding erythema.  Skin:  No rashes.    LABS:                        11.6   14.12 )-----------( 301      ( 2020 07:09 )             37.8         132<L>  |  93<L>  |  20  ----------------------------<  211<H>  4.6   |  21<L>  |  0.74    Ca    9.4      2020 07:09  Phos  3.6       Mg     2.2         TPro  7.2  /  Alb  3.4  /  TBili  0.9  /  DBili  x   /  AST  35  /  ALT  34  /  AlkPhos  130<H>      R knee aspirate:  ,100, RBC 48910 98% P, 1%L    Urinalysis Basic - ( 2020 19:59 )    Color: Yellow / Appearance: Clear / S.015 / pH: x  Gluc: x / Ketone: 15 mg/dL  / Bili: Negative / Urobili: 0.2 E.U./dL   Blood: x / Protein: Trace mg/dL / Nitrite: NEGATIVE   Leuk Esterase: NEGATIVE / RBC: < 5 /HPF / WBC < 5 /HPF   Sq Epi: x / Non Sq Epi: 0-5 /HPF / Bacteria: Present /HPF    Sedimentation Rate, Erythrocyte: 100 mm/Hr (20 @ 07:09)  C-Reactive Protein, Serum: 24.25 mg/dL (20 @ 07:09)    MICROBIOLOGY:  Blood cultures:  7/16 X 2 - NGTD    R knee aspirate 7/17 X 2 - few WBCs, no orgs seen;  NGTD;  AFB cultures X 2 - smear neg    OR cultures :  Tissue R synovium 2:  no orgs, rare WBCs      RADIOLOGY & ADDITIONAL STUDIES:  < from: Xray Chest 1 View- PORTABLE-Routine (20 @ 20:32) >  FINDINGS: The lungs are clear.  There are no pleural effusions. Size of cardiac silhouette cannot be accurately assessed on this study. Left-sided AICD with triple leads overlying right atrium, right ventricle and coronary sinus.. Old left rib fractures.    < end of copied text >    < from: Xray Femur 2 Views, Right (20 @ 20:33) >  INTERPRETATION:  INDICATION: septic joint    4 views of the right femur and 3 views of the left left tibia and fibula have been submitted. Patient is status post right total knee arthroplasty and fixation of the right femur. There is arthrosis of the right hip. There is no evidence of hardware failure or acute fracture. Soft tissue swelling and an effusion are present at the knee.    A skin injury is present overlying the left mid anterior tibial diaphysis. There is no underlying periosteal reaction or erosion. No fracture or dislocation is present.      IMPRESSION: Large skin injury over the mid tibial diaphysis.    Right knee arthroplasty with nonspecific soft tissue swelling and an effusion. If there is concern for infection aspiration should be performed.    < end of copied text >

## 2020-07-17 NOTE — CONSULT NOTE ADULT - SUBJECTIVE AND OBJECTIVE BOX
Attending:      HPI:  65M PMH HTN, HLD, CAD s/p CABG, HRrEF (EFof 25-30% 2020) s/p [facility] BiV placement, DM, pHTN, Celiac disease, diverticulitis, R total knee replacement (10/2019) c/b infection, R hip fracture s/p ORIF (2020). states pain started saurday and has gradually worsened. unable to ambulate on affected side. reports some swelling about the new. no new wounds on affected side. however has had 2 debridements over last 2 weeks by vascular for a diabetic ulcer on the contralateral side. denies any fevers, chills, malaise. states prior to saturday was ambulating just fine. (2020 22:51)    Vascular was consulted for non healing ulcer on anterior surface of tibia that is known to the patient and is treated in an outpatient basis at our [facility].     Patient was seen and examined at bedside right after the arthroscopic washout of the knee joint for infection of the endoprosthesis. Pt still under mild sedation and sleepy. Denies pain at LLE.       PAST MEDICAL & SURGICAL HISTORY:  Diabetic neuropathy  STEMI (ST elevation myocardial infarction)  Diverticulitis  MRSA bacteremia  History of celiac disease  CHF (congestive heart failure): EF ~ 25%  HTN (hypertension)  Diabetes: on insulin pump  Blood clot due to device, implant, or graft: was on blood thinners  HLD (hyperlipidemia)  Osteoarthritis  Atherosclerosis of coronary artery: CAD (coronary artery disease)  Status post percutaneous transluminal coronary angioplasty: in   History of open reduction and internal fixation (ORIF) procedure  Surgery, elective: Right shoulder  Surgery, elective: right knee wound debridement  S/P TKR (total knee replacement), right: with infection Mrsa   per pt he was cleared from MRSA infection  S/P CABG x 1:   Stented coronary artery: 10/18 heart attack  INFERIOR WALL MI  Other postprocedural status: Fixation hardware in foot LEFT      MEDICATIONS  (STANDING):  atorvastatin 40 milliGRAM(s) Oral at bedtime  BACItracin   Ointment 1 Application(s) Topical daily  collagenase Ointment 1 Application(s) Topical daily  dextrose 5%. 1000 milliLiter(s) (50 mL/Hr) IV Continuous <Continuous>  dextrose 50% Injectable 12.5 Gram(s) IV Push once  dextrose 50% Injectable 25 Gram(s) IV Push once  dextrose 50% Injectable 25 Gram(s) IV Push once  digoxin     Tablet 0.125 milliGRAM(s) Oral daily  DULoxetine 90 milliGRAM(s) Oral daily  insulin lispro (HumaLOG) corrective regimen sliding scale   SubCutaneous Before meals and at bedtime  lactated ringers. 1000 milliLiter(s) (50 mL/Hr) IV Continuous <Continuous>  metoprolol succinate ER 25 milliGRAM(s) Oral daily  nystatin Powder 1 Application(s) Topical two times a day  pantoprazole    Tablet 40 milliGRAM(s) Oral before breakfast  piperacillin/tazobactam IVPB.. 3.375 Gram(s) IV Intermittent every 6 hours  polyethylene glycol 3350 17 Gram(s) Oral daily  tamsulosin Oral Tab/Cap - Peds 0.4 milliGRAM(s) Oral at bedtime  vancomycin  IVPB 1250 milliGRAM(s) IV Intermittent every 12 hours    MEDICATIONS  (PRN):  acetaminophen   Tablet .. 975 milliGRAM(s) Oral every 8 hours PRN Temp greater or equal to 38C (100.4F), Mild Pain (1 - 3)  aluminum hydroxide/magnesium hydroxide/simethicone Suspension 30 milliLiter(s) Oral four times a day PRN Indigestion  dextrose 40% Gel 15 Gram(s) Oral once PRN Blood Glucose LESS THAN 70 milliGRAM(s)/deciliter  glucagon  Injectable 1 milliGRAM(s) IntraMuscular once PRN Glucose LESS THAN 70 milligrams/deciliter  HYDROmorphone  Injectable 0.5 milliGRAM(s) IV Push every 15 minutes PRN Breakthrough Pain  magnesium hydroxide Suspension 30 milliLiter(s) Oral daily PRN Constipation  ondansetron Injectable 4 milliGRAM(s) IV Push every 6 hours PRN Nausea and/or Vomiting  oxyCODONE    IR 5 milliGRAM(s) Oral every 4 hours PRN Moderate Pain (4 - 6)  oxyCODONE    IR 10 milliGRAM(s) Oral every 4 hours PRN Severe Pain (7 - 10)  senna 2 Tablet(s) Oral at bedtime PRN Constipation      Allergies    ACE inhibitors (Hives)  carvedilol (Other)  enalapril (Hives)  Entresto (Other)    Intolerances        SOCIAL HISTORY:    FAMILY HISTORY:  Family history of allergies: daughter  Family history of heart disease (Mother)      REVIEW OF SYSTEMS    negative    Vital Signs Last 24 Hrs  T(C): 36.3 (2020 13:45), Max: 38.3 (2020 19:48)  T(F): 97.3 (2020 13:45), Max: 101 (2020 19:48)  HR: 84 (2020 17:00) (83 - 100)  BP: 113/64 (2020 17:00) (108/55 - 131/78)  BP(mean): 83 (2020 17:00) (76 - 91)  RR: 14 (2020 17:00) (14 - 22)  SpO2: 100% (2020 17:00) (96% - 100%)    I&O's Summary    2020 07:01  -  2020 07:00  --------------------------------------------------------  IN: 525 mL / OUT: 1110 mL / NET: -585 mL    2020 07:01  -  2020 17:24  --------------------------------------------------------  IN: 250 mL / OUT: 725 mL / NET: -475 mL        Physical Exam:  General: NAD, resting comfortably, sleepy  Pulmonary: normal resp effort  Cardiovascular: NSR, no murmurs  Abdominal: soft, ND/NT  Extremities: LLE no CCE, large wound [id number] in jae-lateral tibia site, 1cm deep with exposed muscle. No discharge No erythema, no swelling. RLE ace wrapped due to recent surgery.   Neuro: A/O x 3, CNs II-XII grossly intact, normal sensation, no focal deficits  Pulses: R palpable PT, pop, fem - bi DP             L tri DP/PT, palp fem    Lines/drains/tubes:    LABS:                        11.6   14.12 )-----------( 301      ( 2020 07:09 )             37.8     07-17    132<L>  |  93<L>  |  20  ----------------------------<  211<H>  4.6   |  21<L>  |  0.74    Ca    9.4      2020 07:09  Phos  3.6       Mg     2.2         TPro  7.2  /  Alb  3.4  /  TBili  0.9  /  DBili  x   /  AST  35  /  ALT  34  /  AlkPhos  130<H>      PT/INR - ( 2020 19:46 )   PT: 15.2 sec;   INR: 1.28          PTT - ( 2020 19:46 )  PTT:24.3 sec  Urinalysis Basic - ( 2020 19:59 )    Color: Yellow / Appearance: Clear / S.015 / pH: x  Gluc: x / Ketone: 15 mg/dL  / Bili: Negative / Urobili: 0.2 E.U./dL   Blood: x / Protein: Trace mg/dL / Nitrite: NEGATIVE   Leuk Esterase: NEGATIVE / RBC: < 5 /HPF / WBC < 5 /HPF   Sq Epi: x / Non Sq Epi: 0-5 /HPF / Bacteria: Present /HPF      CAPILLARY BLOOD GLUCOSE      POCT Blood Glucose.: 209 mg/dL (2020 15:59)  POCT Blood Glucose.: 179 mg/dL (2020 13:47)  POCT Blood Glucose.: 213 mg/dL (2020 06:28)  POCT Blood Glucose.: 417 mg/dL (2020 06:24)  POCT Blood Glucose.: 272 mg/dL (2020 22:34)    LIVER FUNCTIONS - ( 2020 19:46 )  Alb: 3.4 g/dL / Pro: 7.2 g/dL / ALK PHOS: 130 U/L / ALT: 34 U/L / AST: 35 U/L / GGT: x             Cultures:  Culture Results:   No growth at 12 hours ( @ 22:00)  Culture Results:   No growth at 12 hours ( @ 22:00)  Culture Results:   No growth ( @ 21:12)

## 2020-07-17 NOTE — CONSULT NOTE ADULT - PROBLEM SELECTOR RECOMMENDATION 3
Continue insulin sliding scale. Maintain her blood sugar in the range between 140- 180, and not below 70. Monitor for hypoglycemia. Monitor blood sugar with advancing diet. Hold oral hypoglycemic agents during hospitalization. Adjust insulin.   HbA1C is elevated

## 2020-07-17 NOTE — CONSULT NOTE ADULT - ASSESSMENT
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  He is now s/p arthroscopic I&D in view of prior difficulties with his incision/wound and other co-morbidities.  Unfortunately, he was on cipro while this occurred - no organisms seen on initial gram stain.  Although infection at present may represent persistence from November (MRSA), cannot presume, particularly in view of ST compromise.  He has had extensive antibiotic exposure in the interim and has spent a long time on inpatient services.  He had transaminitis with rifampin in past - would not attempt to add unless Staph grows.  He has had episodes of delirium and is currently not oriented (though post-op) - will avoid antibiotics with potential CNS effects.  Suggest:  - F/U blood cultures  - F/U OR cultures  - Continue vancomycin 1.25 g IV q12h - trough prior to 4th administration at this dose (before 10 pm on 7/18)  - Continue pip-tazo 3.35 g IV q6h   Recommendations discussed with primary team.  Will follow with you - team 2.  Dr. Kay will cover from [facility] through 7/19, I will return 7/20.

## 2020-07-17 NOTE — CONSULT NOTE ADULT - SUBJECTIVE AND OBJECTIVE BOX
CHIEF COMPLAINT:  Knee pain  HISTORY OF PRESENT ILLNESS:  65M PMH HTN, HLD, CAD s/p CABG, HRrEF (EFof 25-30% 02/2020) s/p [facility] [employer] BiV placement, DM, pHTN, Celiac disease, diverticulitis, R total knee replacement (10/2019) c/b infection, R hip fracture s/p ORIF (02/2020). states pain started saurday and has gradually worsened. unable to ambulate on affected side. reports some swelling about the new. no new wounds on affected side. however has had 2 debridements over last 2 weeks by vascular for a diabetic ulcer on the contralateral side. denies any fevers, chills, malaise. states prior to saturday was ambulating well.    Chart, PMH, medication and allergies reviewed  PAST MEDICAL & SURGICAL HISTORY:  Diabetic neuropathy  STEMI (ST elevation myocardial infarction)  Diverticulitis  MRSA bacteremia  History of celiac disease  CHF (congestive heart failure): EF ~ 25%  HTN (hypertension)  Diabetes: on insulin pump  Blood clot due to device, implant, or graft: was on blood thinners  HLD (hyperlipidemia)  Osteoarthritis  Atherosclerosis of coronary artery: CAD (coronary artery disease)  Status post percutaneous transluminal coronary angioplasty: in 2012  History of open reduction and internal fixation (ORIF) procedure  Surgery, elective: Right shoulder  Surgery, elective: right knee wound debridement  S/P TKR (total knee replacement), right: with infection Mrsa   per pt he was cleared from MRSA infection  S/P CABG x 1: 2018  Stented coronary artery: 10/18 heart attack  INFERIOR WALL MI  Other postprocedural status: Fixation hardware in foot LEFT      [  ] Diabetes   [  ] Hypertension  [  ] Hyperlipidemia  [  ] CAD  [  ] PCI  [  ] CABG    PREVIOUS DIAGNOSTIC TESTING:    [  ] Echocardiogram:  [  ]  Catheterization:  [  ] Stress Test:  	    MEDICATIONS:  MEDICATIONS  (STANDING):  acetaminophen   Tablet .. 650 milliGRAM(s) Oral every 6 hours  dextrose 5%. 1000 milliLiter(s) (50 mL/Hr) IV Continuous <Continuous>  dextrose 50% Injectable 12.5 Gram(s) IV Push once  dextrose 50% Injectable 25 Gram(s) IV Push once  dextrose 50% Injectable 25 Gram(s) IV Push once  insulin lispro (HumaLOG) corrective regimen sliding scale   SubCutaneous three times a day before meals  insulin lispro (HumaLOG) corrective regimen sliding scale   SubCutaneous at bedtime  piperacillin/tazobactam IVPB.. 3.375 Gram(s) IV Intermittent every 6 hours  polyethylene glycol 3350 17 Gram(s) Oral daily  sodium chloride 0.9%. 1000 milliLiter(s) (75 mL/Hr) IV Continuous <Continuous>  vancomycin  IVPB 1250 milliGRAM(s) IV Intermittent every 12 hours      FAMILY HISTORY:  Family history of allergies: daughter  Family history of heart disease (Mother)      SOCIAL HISTORY:    [  ] Never smoker  [  ] Non-smoker  [  ] Smoker  [  ] Social alcohol use  [  ] Former smoker    Allergies    ACE inhibitors (Hives)  carvedilol (Other)  enalapril (Hives)  Entresto (Other)    Intolerances    	    REVIEW OF SYSTEMS:  CONSTITUTIONAL: No fever, weight loss, or fatigue  EYES: No eye pain, visual disturbances, or discharge  ENT:  No difficulty hearing, tinnitus, vertigo; No sinus or throat pain  NECK: No pain or stiffness  PULMONARY: No cough, no wheezing, chills or hemoptysis; No Shortness of Breath  CARDIOVASCULAR: No chest pain, no  palpitations, no passing out, dizziness, no leg swelling  GASTROINTESTINAL: No abdominal  pain. No nausea, vomiting, or hematemesis; No diarrhea or constipation. No melena or hematochezia.  GENITOURINARY: No dysuria, frequency, hematuria, or incontinence  NEUROLOGICAL: No headaches, memory loss, loss of strength, numbness, or tremors  SKIN: No itching, burning, rashes, or lesions   LYMPH Nodes: No enlarged glands  ENDOCRINE: No heat or cold intolerance; No hair loss  MUSCULOSKELETAL: No joint pain or swelling; No muscle, back, or extremity pain  PSYCHIATRIC: No depression, anxiety, mood swings, or difficulty sleeping  HEME/LYMPH: No easy bruising, or bleeding gums  ALLERGY AND IMMUNOLOGIC: No hives or eczema	    PHYSICAL EXAM:  T(C): 36.9 (07-17-20 @ 09:15), Max: 38.3 (07-16-20 @ 19:48)  HR: 90 (07-17-20 @ 09:23) (90 - 100)  BP: 110/61 (07-17-20 @ 09:23) (109/60 - 131/78)  RR: 18 (07-17-20 @ 09:23) (16 - 20)  SpO2: 98% (07-17-20 @ 09:23) (96% - 100%)  Wt(kg): --  I&O's Summary    16 Jul 2020 07:01  -  17 Jul 2020 07:00  --------------------------------------------------------  IN: 525 mL / OUT: 1110 mL / NET: -585 mL    17 Jul 2020 07:01  -  17 Jul 2020 09:38  --------------------------------------------------------  IN: 0 mL / OUT: 650 mL / NET: -650 mL        Appearance: Normal	  HEENT:   Normal oral mucosa, PERRL, EOMI	  Lymphatic: No lymphadenopathy  Cardiovascular: Normal S1 S2, No JVD, No murmur, No edema  Pulmonary: Lungs clear to auscultation	  Psychiatry: A & O x 3, Mood & affect appropriate  Gastrointestinal:  Soft, Non-tender, + BS	  Skin: No rashes, No ecchymoses, No cyanosis	  Neurologic: Non-focal  Extremities: Normal range of motion, No clubbing or cyanosis  	 	  CARDIAC MARKERS:                                11.6   14.12 )-----------( 301      ( 17 Jul 2020 07:09 )             37.8     07-17    132<L>  |  93<L>  |  20  ----------------------------<  211<H>  4.6   |  21<L>  |  0.74    Ca    9.4      17 Jul 2020 07:09  Phos  3.6     07-17  Mg     2.2     07-17    TPro  7.2  /  Alb  3.4  /  TBili  0.9  /  DBili  x   /  AST  35  /  ALT  34  /  AlkPhos  130<H>  07-16    proBNP:  Lipid Profile:  HgA1c:  TSH:  PT/INR - ( 16 Jul 2020 19:46 )   PT: 15.2 sec;   INR: 1.28          PTT - ( 16 Jul 2020 19:46 )  PTT:24.3 sec  TELEMETRY: 	    ECG:  	QTC  RADIOLOGY:	    ASSESSMENT/PLAN: CHIEF COMPLAINT:  Knee pain  HISTORY OF PRESENT ILLNESS:  65M PMH HTN, HLD, CAD s/p CABG, HRrEF (EFof 25-30% 02/2020) s/p [facility] BiV placement, DM, pHTN, Celiac disease, diverticulitis, R total knee replacement (10/2019) c/b infection, R hip fracture s/p ORIF (02/2020). states pain started saurday and has gradually worsened. unable to ambulate on affected side. reports some swelling about the new. no new wounds on affected side. however has had 2 debridements over last 2 weeks by vascular for a diabetic ulcer on the contralateral side. denies any fevers, chills, malaise. states prior to saturday was ambulating well.  The patient had bypass of his LAD in June of 2018. In November of 2018 the patient had a stent placed in his right coronary artery and  first obtuse marginal at two sittings. He has recently walks a few blocks without chest pain or dyspnea. He denies palpitations or dizziness.    Chart, PMH, medication and allergies reviewed  PAST MEDICAL & SURGICAL HISTORY:  Diabetic neuropathy  STEMI (ST elevation myocardial infarction)  Diverticulitis  MRSA bacteremia  History of celiac disease  CHF (congestive heart failure): EF ~ 25%  HTN (hypertension)  Diabetes: on insulin pump  Blood clot due to device, implant, or graft: was on blood thinners  HLD (hyperlipidemia)  Osteoarthritis  Atherosclerosis of coronary artery: CAD (coronary artery disease)  Status post percutaneous transluminal coronary angioplasty: in 2012  History of open reduction and internal fixation (ORIF) procedure  Surgery, elective: Right shoulder  Surgery, elective: right knee wound debridement  S/P TKR (total knee replacement), right: with infection Mrsa   per pt he was cleared from MRSA infection  S/P CABG x 1: 2018  Stented coronary artery: 10/18 heart attack  INFERIOR WALL MI  Other postprocedural status: Fixation hardware in foot LEFT      [ x ] Diabetes   [  ] Hypertension  [x  ] Hyperlipidemia  [x  ] CAD  [x  ] PCI  [x  ] CABG    PREVIOUS DIAGNOSTIC TESTING:    [ x ] Echocardiogram:  [ x ]  Catheterization:  [  ] Stress Test:  	    MEDICATIONS:  MEDICATIONS  (STANDING):  acetaminophen   Tablet .. 650 milliGRAM(s) Oral every 6 hours  dextrose 5%. 1000 milliLiter(s) (50 mL/Hr) IV Continuous <Continuous>  dextrose 50% Injectable 12.5 Gram(s) IV Push once  dextrose 50% Injectable 25 Gram(s) IV Push once  dextrose 50% Injectable 25 Gram(s) IV Push once  insulin lispro (HumaLOG) corrective regimen sliding scale   SubCutaneous three times a day before meals  insulin lispro (HumaLOG) corrective regimen sliding scale   SubCutaneous at bedtime  piperacillin/tazobactam IVPB.. 3.375 Gram(s) IV Intermittent every 6 hours  polyethylene glycol 3350 17 Gram(s) Oral daily  sodium chloride 0.9%. 1000 milliLiter(s) (75 mL/Hr) IV Continuous <Continuous>  vancomycin  IVPB 1250 milliGRAM(s) IV Intermittent every 12 hours      FAMILY HISTORY:  Family history of allergies: daughter  Family history of heart disease (Mother)      SOCIAL HISTORY:    [  ] Never smoker  [  ] Non-smoker  [  ] Smoker  [  ] Social alcohol use  [  ] Former smoker    Allergies    ACE inhibitors (Hives)  carvedilol (Other)  enalapril (Hives)  Entresto (Other)    Intolerances    	    REVIEW OF SYSTEMS:  CONSTITUTIONAL: No fever, weight loss, or fatigue  EYES: No eye pain, visual disturbances, or discharge  ENT:  No difficulty hearing, tinnitus, vertigo; No sinus or throat pain  NECK: No pain or stiffness  PULMONARY: No cough, no wheezing, chills or hemoptysis; No Shortness of Breath  CARDIOVASCULAR: No chest pain, no  palpitations, no passing out, dizziness, no leg swelling  GASTROINTESTINAL: No abdominal  pain. No nausea, vomiting, or hematemesis; No diarrhea or constipation. No melena or hematochezia.  GENITOURINARY: No dysuria, frequency, hematuria, or incontinence  NEUROLOGICAL: No headaches, memory loss, loss of strength, numbness, or tremors  SKIN: No itching, burning, rashes, or lesions   LYMPH Nodes: No enlarged glands  ENDOCRINE: No heat or cold intolerance; No hair loss  MUSCULOSKELETAL: +joint pain and swelling; No muscle, back, or extremity pain  PSYCHIATRIC: No depression, anxiety, mood swings, or difficulty sleeping  HEME/LYMPH: No easy bruising, or bleeding gums  ALLERGY AND IMMUNOLOGIC: No hives or eczema	    PHYSICAL EXAM:  T(C): 36.9 (07-17-20 @ 09:15), Max: 38.3 (07-16-20 @ 19:48)  HR: 90 (07-17-20 @ 09:23) (90 - 100)  BP: 110/61 (07-17-20 @ 09:23) (109/60 - 131/78)  RR: 18 (07-17-20 @ 09:23) (16 - 20)  SpO2: 98% (07-17-20 @ 09:23) (96% - 100%)  Wt(kg): --  I&O's Summary    16 Jul 2020 07:01  -  17 Jul 2020 07:00  --------------------------------------------------------  IN: 525 mL / OUT: 1110 mL / NET: -585 mL    17 Jul 2020 07:01  -  17 Jul 2020 09:38  --------------------------------------------------------  IN: 0 mL / OUT: 650 mL / NET: -650 mL        Appearance: Normal	  HEENT:   Normal oral mucosa, PERRL, EOMI	  Lymphatic: No lymphadenopathy  Cardiovascular: Normal S1 S2, No JVD, No murmur, No edema  Pulmonary: Lungs clear to auscultation	  Psychiatry: A & O x 3, Mood & affect appropriate  Gastrointestinal:  Soft, Non-tender, + BS	  Skin: No rashes, No ecchymoses, No cyanosis	  Neurologic: Non-focal  Extremities: Normal range of motion, No clubbing or cyanosis, L knee swollen and red, feet cold no distal pulses  	 	  CARDIAC MARKERS:                                11.6   14.12 )-----------( 301      ( 17 Jul 2020 07:09 )             37.8     07-17    132<L>  |  93<L>  |  20  ----------------------------<  211<H>  4.6   |  21<L>  |  0.74    Ca    9.4      17 Jul 2020 07:09  Phos  3.6     07-17  Mg     2.2     07-17    TPro  7.2  /  Alb  3.4  /  TBili  0.9  /  DBili  x   /  AST  35  /  ALT  34  /  AlkPhos  130<H>  07-16    proBNP:  Lipid Profile:  HgA1c:  TSH:  PT/INR - ( 16 Jul 2020 19:46 )   PT: 15.2 sec;   INR: 1.28          PTT - ( 16 Jul 2020 19:46 )  PTT:24.3 sec  TELEMETRY: 	5 beat VT    ECG:  	QTC a sense vpace  RADIOLOGY:	  Echo severely reduced EF, severe pul htn  ASSESSMENT/PLAN: 	    Severely reduced ejection fraction-the patient takes diuretics occasionally for weight gain. He is sedentary however denies dyspnea. His pulmonary pressures are high on echo. Please check BNP and chest x-ray.    Coronary artery disease-the patient denies chest discomfort. His EKG is uninterpretable secondary to the pacemaker. He was revascularized less than two years ago. There is no clinical evidence for ischemia.    Defibrillator-short runs of ventricular tachycardia. No treatment necessary. CHIEF COMPLAINT:  Knee pain  HISTORY OF PRESENT ILLNESS:  65M PMH HTN, HLD, CAD s/p CABG, HRrEF (EFof 25-30% 02/2020) s/p [facility] BiV placement, DM, pHTN, Celiac disease, diverticulitis, R total knee replacement (10/2019) c/b infection, R hip fracture s/p ORIF (02/2020). states pain started saurday and has gradually worsened. unable to ambulate on affected side. reports some swelling about the new. no new wounds on affected side. however has had 2 debridements over last 2 weeks by vascular for a diabetic ulcer on the contralateral side. denies any fevers, chills, malaise. states prior to saturday was ambulating well.  The patient had bypass of his LAD in June of 2018. In November of 2018 the patient had a stent placed in his right coronary artery and  first obtuse marginal at two sittings. He has recently walks a few blocks without chest pain or dyspnea. He denies palpitations or dizziness.    Chart, PMH, medication and allergies reviewed  PAST MEDICAL & SURGICAL HISTORY:  Diabetic neuropathy  STEMI (ST elevation myocardial infarction)  Diverticulitis  MRSA bacteremia  History of celiac disease  CHF (congestive heart failure): EF ~ 25%  HTN (hypertension)  Diabetes: on insulin pump  Blood clot due to device, implant, or graft: was on blood thinners  HLD (hyperlipidemia)  Osteoarthritis  Atherosclerosis of coronary artery: CAD (coronary artery disease)  Status post percutaneous transluminal coronary angioplasty: in 2012  History of open reduction and internal fixation (ORIF) procedure  Surgery, elective: Right shoulder  Surgery, elective: right knee wound debridement  S/P TKR (total knee replacement), right: with infection Mrsa   per pt he was cleared from MRSA infection  S/P CABG x 1: 2018  Stented coronary artery: 10/18 heart attack  INFERIOR WALL MI  Other postprocedural status: Fixation hardware in foot LEFT      [ x ] Diabetes   [  ] Hypertension  [x  ] Hyperlipidemia  [x  ] CAD  [x  ] PCI  [x  ] CABG    PREVIOUS DIAGNOSTIC TESTING:    [ x ] Echocardiogram:  [ x ]  Catheterization:  [  ] Stress Test:  	    MEDICATIONS:  MEDICATIONS  (STANDING):  acetaminophen   Tablet .. 650 milliGRAM(s) Oral every 6 hours  dextrose 5%. 1000 milliLiter(s) (50 mL/Hr) IV Continuous <Continuous>  dextrose 50% Injectable 12.5 Gram(s) IV Push once  dextrose 50% Injectable 25 Gram(s) IV Push once  dextrose 50% Injectable 25 Gram(s) IV Push once  insulin lispro (HumaLOG) corrective regimen sliding scale   SubCutaneous three times a day before meals  insulin lispro (HumaLOG) corrective regimen sliding scale   SubCutaneous at bedtime  piperacillin/tazobactam IVPB.. 3.375 Gram(s) IV Intermittent every 6 hours  polyethylene glycol 3350 17 Gram(s) Oral daily  sodium chloride 0.9%. 1000 milliLiter(s) (75 mL/Hr) IV Continuous <Continuous>  vancomycin  IVPB 1250 milliGRAM(s) IV Intermittent every 12 hours      FAMILY HISTORY:  Family history of allergies: daughter  Family history of heart disease (Mother)      SOCIAL HISTORY:    [  ] Never smoker  [  ] Non-smoker  [  ] Smoker  [  ] Social alcohol use  [  ] Former smoker    Allergies    ACE inhibitors (Hives)  carvedilol (Other)  enalapril (Hives)  Entresto (Other)    Intolerances    	    REVIEW OF SYSTEMS:  CONSTITUTIONAL: No fever, weight loss, or fatigue  EYES: No eye pain, visual disturbances, or discharge  ENT:  No difficulty hearing, tinnitus, vertigo; No sinus or throat pain  NECK: No pain or stiffness  PULMONARY: No cough, no wheezing, chills or hemoptysis; No Shortness of Breath  CARDIOVASCULAR: No chest pain, no  palpitations, no passing out, dizziness, no leg swelling  GASTROINTESTINAL: No abdominal  pain. No nausea, vomiting, or hematemesis; No diarrhea or constipation. No melena or hematochezia.  GENITOURINARY: No dysuria, frequency, hematuria, or incontinence  NEUROLOGICAL: No headaches, memory loss, loss of strength, numbness, or tremors  SKIN: No itching, burning, rashes, or lesions   LYMPH Nodes: No enlarged glands  ENDOCRINE: No heat or cold intolerance; No hair loss  MUSCULOSKELETAL: +joint pain and swelling; No muscle, back, or extremity pain  PSYCHIATRIC: No depression, anxiety, mood swings, or difficulty sleeping  HEME/LYMPH: No easy bruising, or bleeding gums  ALLERGY AND IMMUNOLOGIC: No hives or eczema	    PHYSICAL EXAM:  T(C): 36.9 (07-17-20 @ 09:15), Max: 38.3 (07-16-20 @ 19:48)  HR: 90 (07-17-20 @ 09:23) (90 - 100)  BP: 110/61 (07-17-20 @ 09:23) (109/60 - 131/78)  RR: 18 (07-17-20 @ 09:23) (16 - 20)  SpO2: 98% (07-17-20 @ 09:23) (96% - 100%)  Wt(kg): --  I&O's Summary    16 Jul 2020 07:01  -  17 Jul 2020 07:00  --------------------------------------------------------  IN: 525 mL / OUT: 1110 mL / NET: -585 mL    17 Jul 2020 07:01  -  17 Jul 2020 09:38  --------------------------------------------------------  IN: 0 mL / OUT: 650 mL / NET: -650 mL        Appearance: Normal	  HEENT:   Normal oral mucosa, PERRL, EOMI	  Lymphatic: No lymphadenopathy  Cardiovascular: Normal S1 S2, No JVD, No murmur, No edema  Pulmonary: Lungs clear to auscultation	  Psychiatry: A & O x 3, Mood & affect appropriate  Gastrointestinal:  Soft, Non-tender, + BS	  Skin: No rashes, No ecchymoses, No cyanosis	  Neurologic: Non-focal  Extremities: Normal range of motion, No clubbing or cyanosis, L knee swollen and red, feet cold no distal pulses  	 	  CARDIAC MARKERS:                                11.6   14.12 )-----------( 301      ( 17 Jul 2020 07:09 )             37.8     07-17    132<L>  |  93<L>  |  20  ----------------------------<  211<H>  4.6   |  21<L>  |  0.74    Ca    9.4      17 Jul 2020 07:09  Phos  3.6     07-17  Mg     2.2     07-17    TPro  7.2  /  Alb  3.4  /  TBili  0.9  /  DBili  x   /  AST  35  /  ALT  34  /  AlkPhos  130<H>  07-16    proBNP:  Lipid Profile:  HgA1c:  TSH:  PT/INR - ( 16 Jul 2020 19:46 )   PT: 15.2 sec;   INR: 1.28          PTT - ( 16 Jul 2020 19:46 )  PTT:24.3 sec  TELEMETRY: 	5 beat VT    ECG:  	QTC a sense vpace  RADIOLOGY:	  Echo severely reduced EF, severe pul htn  ASSESSMENT/PLAN: 	    Severely reduced ejection fraction-the patient takes diuretics occasionally for weight gain. He is sedentary however denies dyspnea. His pulmonary pressures are high on echo. Please check BNP and chest x-ray. Rx metoprolol 25mg daily and digoxin. Check digoxin level.     Coronary artery disease-the patient denies chest discomfort. His EKG is uninterpretable secondary to the pacemaker. He was revascularized less than two years ago. There is no clinical evidence for ischemia. Rx aspirin and atorvastatin.  Rx prasugrel if possible.     Defibrillator-short runs of ventricular tachycardia. No treatment necessary.    The surgery is low risk.  The patient RCRI score is two. He is high risk. CHIEF COMPLAINT:  Knee pain  HISTORY OF PRESENT ILLNESS:  65M PMH HTN, HLD, CAD s/p CABG, HRrEF (EFof 25-30% 02/2020) s/p St car[facility]le BiV placement, DM, pHTN, Celiac disease, diverticulitis, R total knee replacement (10/2019) c/b infection, R hip fracture s/p ORIF (02/2020). states pain started saurday and has gradually worsened. unable to ambulate on affected side. reports some swelling about the new. no new wounds on affected side. however has had 2 debridements over last 2 weeks by vascular for a diabetic ulcer on the contralateral side. denies any fevers, chills, malaise. states prior to saturday was ambulating well.  The patient had bypass of his LAD in June of 2018. In November of 2018 the patient had a stent placed in his right coronary artery and  first obtuse marginal at two sittings. He has recently walks a few blocks without chest pain or dyspnea. He denies palpitations or dizziness.    Chart, PMH, medication and allergies reviewed  PAST MEDICAL & SURGICAL HISTORY:  Diabetic neuropathy  STEMI (ST elevation myocardial infarction)  Diverticulitis  MRSA bacteremia  History of celiac disease  CHF (congestive heart failure): EF ~ 25%  HTN (hypertension)  Diabetes: on insulin pump  Blood clot due to device, implant, or graft: was on blood thinners  HLD (hyperlipidemia)  Osteoarthritis  Atherosclerosis of coronary artery: CAD (coronary artery disease)  Status post percutaneous transluminal coronary angioplasty: in 2012  History of open reduction and internal fixation (ORIF) procedure  Surgery, elective: Right shoulder  Surgery, elective: right knee wound debridement  S/P TKR (total knee replacement), right: with infection Mrsa   per pt he was cleared from MRSA infection  S/P CABG x 1: 2018  Stented coronary artery: 10/18 heart attack  INFERIOR WALL MI  Other postprocedural status: Fixation hardware in foot LEFT      [ x ] Diabetes   [  ] Hypertension  [x  ] Hyperlipidemia  [x  ] CAD  [x  ] PCI  [x  ] CABG    PREVIOUS DIAGNOSTIC TESTING:    [ x ] Echocardiogram:  [ x ]  Catheterization:  [  ] Stress Test:  	    MEDICATIONS:  MEDICATIONS  (STANDING):  acetaminophen   Tablet .. 650 milliGRAM(s) Oral every 6 hours  dextrose 5%. 1000 milliLiter(s) (50 mL/Hr) IV Continuous <Continuous>  dextrose 50% Injectable 12.5 Gram(s) IV Push once  dextrose 50% Injectable 25 Gram(s) IV Push once  dextrose 50% Injectable 25 Gram(s) IV Push once  insulin lispro (HumaLOG) corrective regimen sliding scale   SubCutaneous three times a day before meals  insulin lispro (HumaLOG) corrective regimen sliding scale   SubCutaneous at bedtime  piperacillin/tazobactam IVPB.. 3.375 Gram(s) IV Intermittent every 6 hours  polyethylene glycol 3350 17 Gram(s) Oral daily  sodium chloride 0.9%. 1000 milliLiter(s) (75 mL/Hr) IV Continuous <Continuous>  vancomycin  IVPB 1250 milliGRAM(s) IV Intermittent every 12 hours      FAMILY HISTORY:  Family history of allergies: daughter  Family history of heart disease (Mother)      SOCIAL HISTORY:    [  ] Never smoker  [  ] Non-smoker  [  ] Smoker  [  ] Social alcohol use  [  ] Former smoker    Allergies    ACE inhibitors (Hives)  carvedilol (Other)  enalapril (Hives)  Entresto (Other)    Intolerances    	    REVIEW OF SYSTEMS:  CONSTITUTIONAL: No fever, weight loss, or fatigue  EYES: No eye pain, visual disturbances, or discharge  ENT:  No difficulty hearing, tinnitus, vertigo; No sinus or throat pain  NECK: No pain or stiffness  PULMONARY: No cough, no wheezing, chills or hemoptysis; No Shortness of Breath  CARDIOVASCULAR: No chest pain, no  palpitations, no passing out, dizziness, no leg swelling  GASTROINTESTINAL: No abdominal  pain. No nausea, vomiting, or hematemesis; No diarrhea or constipation. No melena or hematochezia.  GENITOURINARY: No dysuria, frequency, hematuria, or incontinence  NEUROLOGICAL: No headaches, memory loss, loss of strength, numbness, or tremors  SKIN: No itching, burning, rashes, or lesions   LYMPH Nodes: No enlarged glands  ENDOCRINE: No heat or cold intolerance; No hair loss  MUSCULOSKELETAL: +joint pain and swelling; No muscle, back, or extremity pain  PSYCHIATRIC: No depression, anxiety, mood swings, or difficulty sleeping  HEME/LYMPH: No easy bruising, or bleeding gums  ALLERGY AND IMMUNOLOGIC: No hives or eczema	    PHYSICAL EXAM:  T(C): 36.9 (07-17-20 @ 09:15), Max: 38.3 (07-16-20 @ 19:48)  HR: 90 (07-17-20 @ 09:23) (90 - 100)  BP: 110/61 (07-17-20 @ 09:23) (109/60 - 131/78)  RR: 18 (07-17-20 @ 09:23) (16 - 20)  SpO2: 98% (07-17-20 @ 09:23) (96% - 100%)  Wt(kg): --  I&O's Summary    16 Jul 2020 07:01  -  17 Jul 2020 07:00  --------------------------------------------------------  IN: 525 mL / OUT: 1110 mL / NET: -585 mL    17 Jul 2020 07:01  -  17 Jul 2020 09:38  --------------------------------------------------------  IN: 0 mL / OUT: 650 mL / NET: -650 mL        Appearance: Normal	  HEENT:   Normal oral mucosa, PERRL, EOMI	  Lymphatic: No lymphadenopathy  Cardiovascular: Normal S1 S2, No JVD, No murmur, No edema  Pulmonary: Lungs clear to auscultation	  Psychiatry: A & O x 3, Mood & affect appropriate  Gastrointestinal:  Soft, Non-tender, + BS	  Skin: No rashes, No ecchymoses, No cyanosis	  Neurologic: Non-focal  Extremities: Normal range of motion, No clubbing or cyanosis, L knee swollen and red, feet cold no distal pulses  	 	  CARDIAC MARKERS:                                11.6   14.12 )-----------( 301      ( 17 Jul 2020 07:09 )             37.8     07-17    132<L>  |  93<L>  |  20  ----------------------------<  211<H>  4.6   |  21<L>  |  0.74    Ca    9.4      17 Jul 2020 07:09  Phos  3.6     07-17  Mg     2.2     07-17    TPro  7.2  /  Alb  3.4  /  TBili  0.9  /  DBili  x   /  AST  35  /  ALT  34  /  AlkPhos  130<H>  07-16    proBNP:  Lipid Profile:  HgA1c:  TSH:  PT/INR - ( 16 Jul 2020 19:46 )   PT: 15.2 sec;   INR: 1.28          PTT - ( 16 Jul 2020 19:46 )  PTT:24.3 sec  TELEMETRY: 	5 beat VT    ECG:  	QTC a sense vpace  RADIOLOGY:	  Echo severely reduced EF, severe pul htn  ASSESSMENT/PLAN: 	    Severely reduced ejection fraction-the patient takes diuretics occasionally for weight gain. He is sedentary however denies dyspnea. His pulmonary pressures are high on echo. Please check BNP and chest x-ray. Rx metoprolol 25mg daily and digoxin. Check digoxin level.     Coronary artery disease-the patient denies chest discomfort. His EKG is uninterpretable secondary to the pacemaker. He was revascularized less than two years ago. There is no clinical evidence for ischemia. Rx aspirin and atorvastatin.  Rx prasugrel if possible.     Defibrillator-short runs of ventricular tachycardia. No treatment necessary.    The surgery is low risk.  The patient RCRI score is 3. He is high risk.

## 2020-07-18 DIAGNOSIS — Z09 ENCOUNTER FOR FOLLOW-UP EXAMINATION AFTER COMPLETED TREATMENT FOR CONDITIONS OTHER THAN MALIGNANT NEOPLASM: ICD-10-CM

## 2020-07-18 LAB
ANION GAP SERPL CALC-SCNC: 22 MMOL/L — HIGH (ref 5–17)
BUN SERPL-MCNC: 32 MG/DL — HIGH (ref 7–23)
CALCIUM SERPL-MCNC: 9.2 MG/DL — SIGNIFICANT CHANGE UP (ref 8.4–10.5)
CHLORIDE SERPL-SCNC: 93 MMOL/L — LOW (ref 96–108)
CO2 SERPL-SCNC: 16 MMOL/L — LOW (ref 22–31)
CREAT SERPL-MCNC: 0.87 MG/DL — SIGNIFICANT CHANGE UP (ref 0.5–1.3)
GLUCOSE BLDC GLUCOMTR-MCNC: 297 MG/DL — HIGH (ref 70–99)
GLUCOSE BLDC GLUCOMTR-MCNC: 338 MG/DL — HIGH (ref 70–99)
GLUCOSE BLDC GLUCOMTR-MCNC: 399 MG/DL — HIGH (ref 70–99)
GLUCOSE BLDC GLUCOMTR-MCNC: 443 MG/DL — HIGH (ref 70–99)
GLUCOSE BLDC GLUCOMTR-MCNC: 507 MG/DL — CRITICAL HIGH (ref 70–99)
GLUCOSE SERPL-MCNC: 340 MG/DL — HIGH (ref 70–99)
GRAM STN FLD: SIGNIFICANT CHANGE UP
HCT VFR BLD CALC: 36.9 % — LOW (ref 39–50)
HGB BLD-MCNC: 11.4 G/DL — LOW (ref 13–17)
MCHC RBC-ENTMCNC: 23.7 PG — LOW (ref 27–34)
MCHC RBC-ENTMCNC: 30.9 GM/DL — LOW (ref 32–36)
MCV RBC AUTO: 76.6 FL — LOW (ref 80–100)
NIGHT BLUE STAIN TISS: SIGNIFICANT CHANGE UP
NIGHT BLUE STAIN TISS: SIGNIFICANT CHANGE UP
NRBC # BLD: 0 /100 WBCS — SIGNIFICANT CHANGE UP (ref 0–0)
PLATELET # BLD AUTO: 338 K/UL — SIGNIFICANT CHANGE UP (ref 150–400)
POTASSIUM SERPL-MCNC: 4.6 MMOL/L — SIGNIFICANT CHANGE UP (ref 3.5–5.3)
POTASSIUM SERPL-SCNC: 4.6 MMOL/L — SIGNIFICANT CHANGE UP (ref 3.5–5.3)
RBC # BLD: 4.82 M/UL — SIGNIFICANT CHANGE UP (ref 4.2–5.8)
RBC # FLD: 17.6 % — HIGH (ref 10.3–14.5)
SODIUM SERPL-SCNC: 131 MMOL/L — LOW (ref 135–145)
SPECIMEN SOURCE: SIGNIFICANT CHANGE UP
VANCOMYCIN TROUGH SERPL-MCNC: 14.4 UG/ML — SIGNIFICANT CHANGE UP (ref 10–20)
WBC # BLD: 13.44 K/UL — HIGH (ref 3.8–10.5)
WBC # FLD AUTO: 13.44 K/UL — HIGH (ref 3.8–10.5)

## 2020-07-18 PROCEDURE — 99232 SBSQ HOSP IP/OBS MODERATE 35: CPT

## 2020-07-18 RX ADMIN — Medication 8: at 08:15

## 2020-07-18 RX ADMIN — Medication 166.67 MILLIGRAM(S): at 11:00

## 2020-07-18 RX ADMIN — PANTOPRAZOLE SODIUM 40 MILLIGRAM(S): 20 TABLET, DELAYED RELEASE ORAL at 05:36

## 2020-07-18 RX ADMIN — SODIUM CHLORIDE 50 MILLILITER(S): 9 INJECTION, SOLUTION INTRAVENOUS at 00:29

## 2020-07-18 RX ADMIN — Medication 166.67 MILLIGRAM(S): at 22:17

## 2020-07-18 RX ADMIN — Medication 10: at 17:56

## 2020-07-18 RX ADMIN — Medication 25 MILLIGRAM(S): at 05:36

## 2020-07-18 RX ADMIN — PIPERACILLIN AND TAZOBACTAM 200 GRAM(S): 4; .5 INJECTION, POWDER, LYOPHILIZED, FOR SOLUTION INTRAVENOUS at 00:00

## 2020-07-18 RX ADMIN — ATORVASTATIN CALCIUM 40 MILLIGRAM(S): 80 TABLET, FILM COATED ORAL at 22:17

## 2020-07-18 RX ADMIN — Medication 1 APPLICATION(S): at 12:40

## 2020-07-18 RX ADMIN — OXYCODONE HYDROCHLORIDE 10 MILLIGRAM(S): 5 TABLET ORAL at 07:07

## 2020-07-18 RX ADMIN — DULOXETINE HYDROCHLORIDE 90 MILLIGRAM(S): 30 CAPSULE, DELAYED RELEASE ORAL at 12:40

## 2020-07-18 RX ADMIN — NYSTATIN CREAM 1 APPLICATION(S): 100000 CREAM TOPICAL at 05:36

## 2020-07-18 RX ADMIN — Medication 81 MILLIGRAM(S): at 17:56

## 2020-07-18 RX ADMIN — Medication 6: at 12:40

## 2020-07-18 RX ADMIN — Medication 12: at 22:16

## 2020-07-18 RX ADMIN — Medication 81 MILLIGRAM(S): at 05:36

## 2020-07-18 RX ADMIN — Medication 1 APPLICATION(S): at 12:41

## 2020-07-18 RX ADMIN — PIPERACILLIN AND TAZOBACTAM 200 GRAM(S): 4; .5 INJECTION, POWDER, LYOPHILIZED, FOR SOLUTION INTRAVENOUS at 06:06

## 2020-07-18 RX ADMIN — PIPERACILLIN AND TAZOBACTAM 200 GRAM(S): 4; .5 INJECTION, POWDER, LYOPHILIZED, FOR SOLUTION INTRAVENOUS at 12:39

## 2020-07-18 RX ADMIN — OXYCODONE HYDROCHLORIDE 10 MILLIGRAM(S): 5 TABLET ORAL at 05:36

## 2020-07-18 NOTE — PHYSICAL THERAPY INITIAL EVALUATION ADULT - PLANNED THERAPY INTERVENTIONS, PT EVAL
stair training/balance training/strengthening/transfer training/ROM/bed mobility training/gait training

## 2020-07-18 NOTE — PROGRESS NOTE ADULT - SUBJECTIVE AND OBJECTIVE BOX
Interval Events: Reviewed  Patient seen and examined at bedside.    Patient is a 65y old  Male who presents with a chief complaint of R periprosthetic joint infection (2020 08:21)  1 day s/p arthroscopic I and D right knee,       PAST MEDICAL & SURGICAL HISTORY:  Diabetic neuropathy  STEMI (ST elevation myocardial infarction)  Diverticulitis  MRSA bacteremia  History of celiac disease  CHF (congestive heart failure): EF ~ 25%  HTN (hypertension)  Diabetes: on insulin pump  Blood clot due to device, implant, or graft: was on blood thinners  HLD (hyperlipidemia)  Osteoarthritis  Atherosclerosis of coronary artery: CAD (coronary artery disease)  Status post percutaneous transluminal coronary angioplasty: in   History of open reduction and internal fixation (ORIF) procedure  Surgery, elective: Right shoulder  Surgery, elective: right knee wound debridement  S/P TKR (total knee replacement), right: with infection Mrsa   per pt he was cleared from MRSA infection  S/P CABG x 1:   Stented coronary artery: 10/18 heart attack  INFERIOR WALL MI  Other postprocedural status: Fixation hardware in foot LEFT      MEDICATIONS:  Pulmonary:    Antimicrobials:  piperacillin/tazobactam IVPB.. 3.375 Gram(s) IV Intermittent every 6 hours  vancomycin  IVPB 1250 milliGRAM(s) IV Intermittent every 12 hours    Anticoagulants:  aspirin enteric coated 81 milliGRAM(s) Oral two times a day  prasugrel 10 milliGRAM(s) Oral daily    Cardiac:  digoxin     Tablet 0.125 milliGRAM(s) Oral daily  metoprolol succinate ER 25 milliGRAM(s) Oral daily  tamsulosin Oral Tab/Cap - Peds 0.4 milliGRAM(s) Oral at bedtime      Allergies    ACE inhibitors (Hives)  carvedilol (Other)  enalapril (Hives)  Entresto (Other)    Intolerances        Vital Signs Last 24 Hrs  T(C): 36.1 (2020 05:43), Max: 36.7 (2020 19:00)  T(F): 96.9 (2020 05:43), Max: 98.1 (2020 19:00)  HR: 98 (2020 05:43) (76 - 98)  BP: 133/60 (2020 05:43) (106/55 - 134/69)  BP(mean): 78 (2020 21:00) (76 - 91)  RR: 16 (2020 05:43) (12 - 22)  SpO2: 99% (2020 05:43) (95% - 100%)    07-17 @ 07:01  -  07-18 @ 07:00  --------------------------------------------------------  IN: 1075 mL / OUT: 1815 mL / NET: -740 mL          Review of Systems:   •	General: negative  •	Skin/Breast: negative  •	Ophthalmologic: negative  •	ENMT: negative  •	Respiratory and Thorax: negative  •	Cardiovascular: negative  •	Gastrointestinal: negative  •	Genitourinary: negative  •	Musculoskeletal: negative  •	Neurological: negative  •	Psychiatric: negative  •	Hematology/Lymphatics: negative  •	Endocrine: negative  •	Allergic/Immunologic: negative    Physical Exam:   • Constitutional:	Well-developed, well nourished  • Eyes:	EOMI; PERRL; no drainage or redness  • ENMT:	No oral lesions; no gross abnormalities  • Neck	 no thyromegaly or nodules  • Breasts:	not examined  • Back:	No deformity or limitation of movement  • Respiratory:	Breath Sounds equal & clear to auscultation, no accessory muscle use  • Cardiovascular:	Regular rate & rhythm, normal S1, S2; no murmurs, gallops or rubs; no S3, S4  • Gastrointestinal:	Soft, non-tender, no hepatosplenomegaly, normal bowel sounds  • Genitourinary:	not examined  • Rectal: not examined  • Extremities:	No cyanosis, clubbing or edema, right lower leg in dressing and drain  • Vascular:	Equal and normal pulses (dorsalis pedis)  • Neurologica:l	not examined  • Skin:	No lesions; no rash  • Lymph Nodes:	No lymphadedenopathy  • Musculoskeletal:	No joint pain, swelling or deformity; no limitation of movement        LABS:      CBC Full  -  ( 2020 07:56 )  WBC Count : 13.44 K/uL  RBC Count : 4.82 M/uL  Hemoglobin : 11.4 g/dL  Hematocrit : 36.9 %  Platelet Count - Automated : 338 K/uL  Mean Cell Volume : 76.6 fl  Mean Cell Hemoglobin : 23.7 pg  Mean Cell Hemoglobin Concentration : 30.9 gm/dL  Auto Neutrophil # : x  Auto Lymphocyte # : x  Auto Monocyte # : x  Auto Eosinophil # : x  Auto Basophil # : x  Auto Neutrophil % : x  Auto Lymphocyte % : x  Auto Monocyte % : x  Auto Eosinophil % : x  Auto Basophil % : x    07    131<L>  |  93<L>  |  32<H>  ----------------------------<  340<H>  4.6   |  16<L>  |  0.87    Ca    9.2      2020 07:56  Phos  3.6       Mg     2.2         TPro  7.2  /  Alb  3.4  /  TBili  0.9  /  DBili  x   /  AST  35  /  ALT  34  /  AlkPhos  130<H>      PT/INR - ( 2020 19:46 )   PT: 15.2 sec;   INR: 1.28          PTT - ( 2020 19:46 )  PTT:24.3 sec      Urinalysis Basic - ( 2020 19:59 )    Color: Yellow / Appearance: Clear / S.015 / pH: x  Gluc: x / Ketone: 15 mg/dL  / Bili: Negative / Urobili: 0.2 E.U./dL   Blood: x / Protein: Trace mg/dL / Nitrite: NEGATIVE   Leuk Esterase: NEGATIVE / RBC: < 5 /HPF / WBC < 5 /HPF   Sq Epi: x / Non Sq Epi: 0-5 /HPF / Bacteria: Present /HPF              Culture Results:   Culture in progress ( @ 22:00)  Culture Results:   Culture in progress ( @ 22:00)  Culture Results:   No growth ( @ 21:12)      RADIOLOGY & ADDITIONAL STUDIES (The following images were personally reviewed):  Loja:                                     No  Urine output:                       adequate  DVT prophylaxis:                 Yes  Flattus:                                  Yes  Bowel movement:              No

## 2020-07-18 NOTE — PHYSICAL THERAPY INITIAL EVALUATION ADULT - CAPILLARY REFILL, RLE
unable to palpate pedal pulse, cool (L) foot (8:21am orthopedic consult note on 7/18/20 documented aware)

## 2020-07-18 NOTE — PHYSICAL THERAPY INITIAL EVALUATION ADULT - PERTINENT HX OF CURRENT PROBLEM, REHAB EVAL
65M PMH HTN, HLD, CAD s/p CABG, HRrEF (EFof 25-30% 02/2020) s/p [facility] BiV placement, DM, pHTN, Celiac disease, diverticulitis, R total knee replacement (10/2019) c/b infection, R hip fracture s/p ORIF (02/2020). states pain started saurday and has gradually worsened. unable to ambulate on affected side. reports some swelling about the new. no new wounds on affected. Please refer to H&P on [place] for remaining.

## 2020-07-18 NOTE — PROGRESS NOTE ADULT - SUBJECTIVE AND OBJECTIVE BOX
Ortho Post Op Check    Procedure: R Arthroscopic Knee I&D  Surgeon: Dr. Castro    Pt comfortable without complaints, pain controlled  Denies CP, SOB, N/V, numbness/tingling     Vital Signs Last 24 Hrs  T(C): 36.2 (07-17-20 @ 22:26), Max: 36.2 (07-17-20 @ 22:26)  T(F): 97.1 (07-17-20 @ 22:26), Max: 97.1 (07-17-20 @ 22:26)  HR: 80 (07-17-20 @ 22:26) (79 - 80)  BP: 119/63 (07-17-20 @ 22:26) (106/55 - 120/70)  BP(mean): 78 (07-17-20 @ 21:00) (78 - 89)  RR: 14 (07-17-20 @ 22:26) (12 - 19)  SpO2: 95% (07-17-20 @ 22:26) (95% - 100%)  AVSS    General: Pt Alert and oriented, NAD  DSG C/D/I  Pulses: 2+  Sensation: SILT bilaterally distally, slightly diminished 2/2 diabetic neuropathy  Motor: EHL/FHL/TA/GS 5/5 RLE                          11.6   14.12 )-----------( 301      ( 17 Jul 2020 07:09 )             37.8   17 Jul 2020 07:09    132    |  93     |  20     ----------------------------<  211    4.6     |  21     |  0.74     Phos  3.6       17 Jul 2020 07:09  Mg     2.2       17 Jul 2020 07:09    TPro  7.2    /  Alb  3.4    /  TBili  0.9    /  DBili  x      /  AST  35     /  ALT  34     /  AlkPhos  130    16 Jul 2020 19:46    A/P: 65yMale POD#0 s/p R Arthroscopic I&D for periprosthetic infection  - Stable  - Pain Control  - Multi podus boots for ulcer ppx  - DVT ppx: ASA  - Post op abx: Vanco/Zosyn  - PT, WBS: WBAT    Ortho Pager [phone]

## 2020-07-18 NOTE — PROGRESS NOTE ADULT - SUBJECTIVE AND OBJECTIVE BOX
piperacillin/tazobactam IVPB.. 3.375  vancomycin  IVPB 1250  aspirin enteric coated 81  digoxin     Tablet 0.125  metoprolol succinate ER 25  piperacillin/tazobactam IVPB.. 3.375  prasugrel 10  tamsulosin Oral Tab/Cap - Peds 0.4  vancomycin  IVPB 1250      Allergies    ACE inhibitors (Hives)  carvedilol (Other)  enalapril (Hives)  Entresto (Other)    Intolerances        Vital Signs Last 24 Hrs  T(C): 36.3 (18 Jul 2020 09:30), Max: 36.7 (17 Jul 2020 19:00)  T(F): 97.4 (18 Jul 2020 09:30), Max: 98.1 (17 Jul 2020 19:00)  HR: 93 (18 Jul 2020 09:30) (76 - 98)  BP: 124/72 (18 Jul 2020 09:30) (106/55 - 134/69)  BP(mean): 78 (17 Jul 2020 21:00) (76 - 91)  RR: 16 (18 Jul 2020 09:30) (12 - 22)  SpO2: 99% (18 Jul 2020 09:30) (95% - 100%)  I&O's Summary    17 Jul 2020 07:01  -  18 Jul 2020 07:00  --------------------------------------------------------  IN: 1075 mL / OUT: 1815 mL / NET: -740 mL        Physical Exam:  General:  Pulmonary: breathing nonlabored, adequate and equal chest rise  Cardiovascular: distal pulses intact  Abdominal: abdomen soft and nontender  Extremities: warm and well perfused. 5/5 hip flexion. 5/5 foot dorsiflexion and plantarflexion  Right:       Palpable femoral                                                                  Left: Palpable femoral  DP [x ]2+ [ ]1+ [ ]doppler                                                DP [ ]2+ [ ]1+ [ x] doppler  PT[x ]2+ [ ]1+ [ ]doppler                                                  PT [ ]2+ [ ]1+ [x ]doppler      LABS:                        11.4   13.44 )-----------( 338      ( 18 Jul 2020 07:56 )             36.9     07-18    131<L>  |  93<L>  |  32<H>  ----------------------------<  340<H>  4.6   |  16<L>  |  0.87    Ca    9.2      18 Jul 2020 07:56  Phos  3.6     07-17  Mg     2.2     07-17    TPro  7.2  /  Alb  3.4  /  TBili  0.9  /  DBili  x   /  AST  35  /  ALT  34  /  AlkPhos  130<H>  07-16    PT/INR - ( 16 Jul 2020 19:46 )   PT: 15.2 sec;   INR: 1.28          PTT - ( 16 Jul 2020 19:46 )  PTT:24.3 sec    Radiology and Additional Studies:    Assessment and Recommendation:   · Assessment		  65 M presenting w new onset knee pain in setting of ulcer debridements likely septic knee. Known Non healing wound in contralateral LE (R)    Patient doing well and wound appears to be healing well without any surrounding erythema, discharge, or swelling. Patient's wound care performed at beside using hydragel, kerlix wrap, and ACE wrap.    Recommendations:  - At this time, primary recommendation is wound care with hydragel, kerlix wraps, and ace wrap compression.  - Orthopedic attending at bedside noted concern for infection of open wound and possible need for further debridement; will discuss with vascular attending Dr. Malloy and f/u with primary orthopedic team  - Continue with follow up at the office once discharged   - discussed with Chief on call  - call /[phone] with questions

## 2020-07-18 NOTE — PHYSICAL THERAPY INITIAL EVALUATION ADULT - THERAPY FREQUENCY, PT EVAL
Patient educated on frequency of inpatient therapy at [facility], patient verbalized understanding./daily

## 2020-07-18 NOTE — PROGRESS NOTE ADULT - SUBJECTIVE AND OBJECTIVE BOX
Pt comfortable without complaints, pain controlled, much more interactive and cooperative this AM  Denies CP, SOB, N/V, numbness/tingling     Vital Signs Last 24 Hrs  T(C): 36.1 (18 Jul 2020 05:43), Max: 36.9 (17 Jul 2020 09:15)  T(F): 96.9 (18 Jul 2020 05:43), Max: 98.5 (17 Jul 2020 09:15)  HR: 98 (18 Jul 2020 05:43) (76 - 98)  BP: 133/60 (18 Jul 2020 05:43) (106/55 - 134/69)  BP(mean): 78 (17 Jul 2020 21:00) (76 - 91)  RR: 16 (18 Jul 2020 05:43) (12 - 22)  SpO2: 99% (18 Jul 2020 05:43) (95% - 100%)    General: Pt Alert and oriented, NAD  DSG C/D/I  Pulses: 2+  Sensation: SILT bilaterally distally, slightly diminished 2/2 diabetic neuropathy  Motor: EHL/FHL/TA/GS 5/5 RLE                          11.6   14.12 )-----------( 301      ( 17 Jul 2020 07:09 )             37.8   17 Jul 2020 07:09    132    |  93     |  20     ----------------------------<  211    4.6     |  21     |  0.74     Phos  3.6       17 Jul 2020 07:09  Mg     2.2       17 Jul 2020 07:09    TPro  7.2    /  Alb  3.4    /  TBili  0.9    /  DBili  x      /  AST  35     /  ALT  34     /  AlkPhos  130    16 Jul 2020 19:46    A/P: 65yMale POD#1 s/p R Arthroscopic I&D for periprosthetic infection  - Stable  - Pain Control  - Multi podus boots for ulcer ppx  - DVT ppx: ASA  - Post op abx: Vanco/Zosyn  - PT, WBS: WBAT    Ortho Pager [phone]

## 2020-07-18 NOTE — PROGRESS NOTE ADULT - SUBJECTIVE AND OBJECTIVE BOX
INTERVAL HPI/OVERNIGHT EVENTS: Denies fever or R knee pain.    CONSTITUTIONAL:  Negative fever or chills, feels well, good appetite  EYES:  Negative  blurry vision or double vision  CARDIOVASCULAR:  Negative for chest pain or palpitations  RESPIRATORY:  Negative for cough, wheezing, or SOB   GASTROINTESTINAL:  Negative for nausea, vomiting, diarrhea, constipation, or abdominal pain  GENITOURINARY:  Negative frequency, urgency or dysuria  NEUROLOGIC:  No headache, confusion, dizziness, lightheadedness      ANTIBIOTICS/RELEVANT:    MEDICATIONS  (STANDING):  aspirin enteric coated 81 milliGRAM(s) Oral two times a day  atorvastatin 40 milliGRAM(s) Oral at bedtime  BACItracin   Ointment 1 Application(s) Topical daily  collagenase Ointment 1 Application(s) Topical daily  dextrose 5%. 1000 milliLiter(s) (50 mL/Hr) IV Continuous <Continuous>  dextrose 50% Injectable 12.5 Gram(s) IV Push once  dextrose 50% Injectable 25 Gram(s) IV Push once  dextrose 50% Injectable 25 Gram(s) IV Push once  digoxin     Tablet 0.125 milliGRAM(s) Oral daily  DULoxetine 90 milliGRAM(s) Oral daily  insulin lispro (HumaLOG) corrective regimen sliding scale   SubCutaneous Before meals and at bedtime  lactated ringers. 1000 milliLiter(s) (50 mL/Hr) IV Continuous <Continuous>  metoprolol succinate ER 25 milliGRAM(s) Oral daily  nystatin Powder 1 Application(s) Topical two times a day  pantoprazole    Tablet 40 milliGRAM(s) Oral before breakfast  piperacillin/tazobactam IVPB.. 3.375 Gram(s) IV Intermittent every 6 hours  polyethylene glycol 3350 17 Gram(s) Oral daily  prasugrel 10 milliGRAM(s) Oral daily  tamsulosin Oral Tab/Cap - Peds 0.4 milliGRAM(s) Oral at bedtime  vancomycin  IVPB 1250 milliGRAM(s) IV Intermittent every 12 hours    MEDICATIONS  (PRN):  acetaminophen   Tablet .. 975 milliGRAM(s) Oral every 8 hours PRN Temp greater or equal to 38C (100.4F), Mild Pain (1 - 3)  aluminum hydroxide/magnesium hydroxide/simethicone Suspension 30 milliLiter(s) Oral four times a day PRN Indigestion  dextrose 40% Gel 15 Gram(s) Oral once PRN Blood Glucose LESS THAN 70 milliGRAM(s)/deciliter  glucagon  Injectable 1 milliGRAM(s) IntraMuscular once PRN Glucose LESS THAN 70 milligrams/deciliter  HYDROmorphone  Injectable 0.5 milliGRAM(s) IV Push every 15 minutes PRN Breakthrough Pain  magnesium hydroxide Suspension 30 milliLiter(s) Oral daily PRN Constipation  ondansetron Injectable 4 milliGRAM(s) IV Push every 6 hours PRN Nausea and/or Vomiting  oxyCODONE    IR 5 milliGRAM(s) Oral every 4 hours PRN Moderate Pain (4 - 6)  oxyCODONE    IR 10 milliGRAM(s) Oral every 4 hours PRN Severe Pain (7 - 10)  senna 2 Tablet(s) Oral at bedtime PRN Constipation        Vital Signs Last 24 Hrs  T(C): 36.8 (2020 14:53), Max: 36.8 (2020 14:53)  T(F): 98.3 (2020 14:53), Max: 98.3 (2020 14:53)  HR: 85 (2020 14:53) (76 - 98)  BP: 106/59 (2020 14:53) (106/55 - 134/69)  BP(mean): 78 (2020 21:00) (78 - 91)  RR: 17 (2020 14:53) (12 - 19)  SpO2: 95% (2020 14:53) (95% - 100%)    PHYSICAL EXAM:  Constitutional:Well-developed, well nourished  Eyes:DAMARIS, EOMI  Ear/Nose/Throat: no oral lesion, no sinus tenderness on percussion	  Neck:no JVD, no lymphadenopathy, supple  Respiratory: CTA colton  Cardiovascular: S1S2 RRR, no murmurs  Gastrointestinal:soft, (+) BS, no HSM  Extremities:no e/e/c  Vascular: DP Pulse:	right normal; left normal      LABS:                        11.4   13.44 )-----------( 338      ( 2020 07:56 )             36.9     07-18    131<L>  |  93<L>  |  32<H>  ----------------------------<  340<H>  4.6   |  16<L>  |  0.87    Ca    9.2      2020 07:56  Phos  3.6     07-  Mg     2.2     -    TPro  7.2  /  Alb  3.4  /  TBili  0.9  /  DBili  x   /  AST  35  /  ALT  34  /  AlkPhos  130<H>  07-16    PT/INR - ( 2020 19:46 )   PT: 15.2 sec;   INR: 1.28          PTT - ( 2020 19:46 )  PTT:24.3 sec  Urinalysis Basic - ( 2020 19:59 )    Color: Yellow / Appearance: Clear / S.015 / pH: x  Gluc: x / Ketone: 15 mg/dL  / Bili: Negative / Urobili: 0.2 E.U./dL   Blood: x / Protein: Trace mg/dL / Nitrite: NEGATIVE   Leuk Esterase: NEGATIVE / RBC: < 5 /HPF / WBC < 5 /HPF   Sq Epi: x / Non Sq Epi: 0-5 /HPF / Bacteria: Present /HPF        MICROBIOLOGY: Culture - Body Fluid with Gram Stain (20 @ 14:37)    Gram Stain:   Few Gram Positive Cocci in Clusters  Numerous White blood cells    Specimen Source: .Body Fluid Right Synovium #1    Culture Results:   Few Staphylococcus aureus presumptive Methicillin resistant  Confirmation to follow within 24 hours  Susceptibility to follow.  Floor previously notified.    Culture - Blood (20 @ 22:00)    -  Methicillin resistant Staphylococcus aureus (MRSA): Detec Cindi Jones RN    Gram Stain:   Aerobic Bottle: Gram Positive Cocci in Clusters  Result called to and read back byCuauhtemoc Cortes RN ([id number])  2020  18:32:43  Anaerobic Bottle: Gram Positive Cocci in Clusters  Floor previously notified.  ***Blood Panel PCR results on this specimen are available  approximately 3 hours after the Gram stain result.***  Gram stain, PCR, and/or culture results may not always  correspond due to difference in methodologies.  ************************************************************  This PCR assay was performed using [employer].  The following targets are tested for: Enterococcus,  vancomycin resistant enterococci, Listeria monocytogenes,  coagulase negative staphylococci, S. aureus,  methicillin resistant S. aureus, Streptococcus agalactiae  (Group B), S. pneumoniae, S. pyogenes (Group A),  Acinetobacter baumannii, Enterobacter cloacae, E. coli,  Klebsiella oxytoca, K. pneumoniae, Proteus sp.,  Serratia marcescens, Haemophilus influenzae,  Neisseria meningitidis, Pseudomonas aeruginosa, Candida  albicans, C. glabrata, C krusei, C parapsilosis,  C. tropicalis and the KPC resistance gene.    Specimen Source: .Blood Blood-Peripheral    Organism: Blood Culture PCR    Culture Results:   Growth in aerobic and anaerobic bottles: Staphylococcus aureus  presumptive Methicillin resistant  Confirmation to follow within 24 hours  Floor previously notified.  Susceptibility to follow.    Organism Identification: Blood Culture PCR    Method Type: PCR        RADIOLOGY & ADDITIONAL STUDIES: reviewed

## 2020-07-18 NOTE — PHYSICAL THERAPY INITIAL EVALUATION ADULT - ACTIVE RANGE OF MOTION EXAMINATION, REHAB EVAL
deficits as listed below/(R)knee extension 0 degrees (measure in semi-supine; (R)knee flexion 85 degrees (measured in sitting)/bilateral upper extremity Active ROM was WFL (within functional limits)/bilateral  lower extremity Active ROM was WFL (within functional limits)

## 2020-07-18 NOTE — PHYSICAL THERAPY INITIAL EVALUATION ADULT - GAIT DEVIATIONS NOTED, PT EVAL
decreased louann/fairly steady gait, no LOB/knee buckling noted; increased time required with turning; decreased stance time through (R)LE; good negotiation through room obstacles without gait disturbances noted/decreased velocity of limb motion/decreased step length/decreased weight-shifting ability

## 2020-07-18 NOTE — PHYSICAL THERAPY INITIAL EVALUATION ADULT - ADDITIONAL COMMENTS
Patient reports previously independent with all ADLs/IADLs prior to admission. No HHA. Denies history of mechanical falls within the past 6 months. Reports wife is in good functional health (no DME, no falls) and will be able to assist patient 24/7 as needed upon discharge from [facility].

## 2020-07-18 NOTE — PHYSICAL THERAPY INITIAL EVALUATION ADULT - PHYSICAL ASSIST/NONPHYSICAL ASSIST: SIT/STAND, REHAB EVAL
verbal cues/fairly steady, no LOB/knee buckling noted/1 person assist/nonverbal cues (demo/gestures)

## 2020-07-18 NOTE — PHYSICAL THERAPY INITIAL EVALUATION ADULT - GAIT DISTANCE, PT EVAL
~30 feet x 1 (therapist maintained conservative distance 2/2 +asymptomatic orthostatic hypotension, continued to decrease upon returning to sitting EOB; please refer to Vitals Flow sheet for details)

## 2020-07-18 NOTE — PROGRESS NOTE ADULT - ATTENDING COMMENTS
Seen by me this morning. Very comfortable. Hasn't yet risen from bed.     VSS  Right knee dressings intact  Hemovac draining dark sanguinous with some fibrinous material  ROM 10-80, stable liv/teodoro  Feet cool with no palpable pulse   Left tibia with large anterior wound, exposed tibia and tib ant belly    WBC > 13  BCx +MRSA    Plan to cont broad spectrum IV abx with MRSA coverage, pending OR cultures. Anticipate lifelong chronic antibiotic suppression. Follow up with Vascular regarding management of left tibia wound. Trend inflammatory markers. Follow up ID regarding timing for repeat blood cultures for PICC placement. I told him he may be a candidate for discharge home by next weekend if all goes well.

## 2020-07-18 NOTE — PHYSICAL THERAPY INITIAL EVALUATION ADULT - GENERAL OBSERVATIONS, REHAB EVAL
PT IE completed. Chart reviewed. GaitFIM:1, StairFIM:n/a. Patient without complaints of pain at rest, agreeable to PT. Patient received semi-supine, NAD, +tele, +(R)IV, +(R)knee MIS and ace wrapping intact, +(L)LE ace wrapping intact, NICANOR Shelley cleared patient for treatment.

## 2020-07-19 LAB
-  CEFAZOLIN: SIGNIFICANT CHANGE UP
-  CLINDAMYCIN: SIGNIFICANT CHANGE UP
-  DAPTOMYCIN: SIGNIFICANT CHANGE UP
-  ERYTHROMYCIN: SIGNIFICANT CHANGE UP
-  LINEZOLID: SIGNIFICANT CHANGE UP
-  OXACILLIN: SIGNIFICANT CHANGE UP
-  RIFAMPIN: SIGNIFICANT CHANGE UP
-  TETRACYCLINE: SIGNIFICANT CHANGE UP
-  TRIMETHOPRIM/SULFAMETHOXAZOLE: SIGNIFICANT CHANGE UP
-  VANCOMYCIN: SIGNIFICANT CHANGE UP
ALBUMIN SERPL ELPH-MCNC: 3.3 G/DL — SIGNIFICANT CHANGE UP (ref 3.3–5)
ALP SERPL-CCNC: 119 U/L — SIGNIFICANT CHANGE UP (ref 40–120)
ALT FLD-CCNC: 53 U/L — HIGH (ref 10–45)
ANION GAP SERPL CALC-SCNC: 10 MMOL/L — SIGNIFICANT CHANGE UP (ref 5–17)
AST SERPL-CCNC: 53 U/L — HIGH (ref 10–40)
BILIRUB DIRECT SERPL-MCNC: 0.4 MG/DL — HIGH (ref 0–0.2)
BILIRUB INDIRECT FLD-MCNC: 0.6 MG/DL — SIGNIFICANT CHANGE UP (ref 0.2–1)
BILIRUB SERPL-MCNC: 1 MG/DL — SIGNIFICANT CHANGE UP (ref 0.2–1.2)
BUN SERPL-MCNC: 35 MG/DL — HIGH (ref 7–23)
CALCIUM SERPL-MCNC: 9.2 MG/DL — SIGNIFICANT CHANGE UP (ref 8.4–10.5)
CHLORIDE SERPL-SCNC: 98 MMOL/L — SIGNIFICANT CHANGE UP (ref 96–108)
CO2 SERPL-SCNC: 25 MMOL/L — SIGNIFICANT CHANGE UP (ref 22–31)
CREAT SERPL-MCNC: 0.93 MG/DL — SIGNIFICANT CHANGE UP (ref 0.5–1.3)
CRP SERPL-MCNC: 9.34 MG/DL — HIGH (ref 0–0.4)
CULTURE RESULTS: SIGNIFICANT CHANGE UP
ERYTHROCYTE [SEDIMENTATION RATE] IN BLOOD: 55 MM/HR — HIGH
GLUCOSE BLDC GLUCOMTR-MCNC: 234 MG/DL — HIGH (ref 70–99)
GLUCOSE BLDC GLUCOMTR-MCNC: 298 MG/DL — HIGH (ref 70–99)
GLUCOSE BLDC GLUCOMTR-MCNC: 320 MG/DL — HIGH (ref 70–99)
GLUCOSE BLDC GLUCOMTR-MCNC: 449 MG/DL — HIGH (ref 70–99)
GLUCOSE SERPL-MCNC: 240 MG/DL — HIGH (ref 70–99)
HCT VFR BLD CALC: 33.5 % — LOW (ref 39–50)
HGB BLD-MCNC: 10.5 G/DL — LOW (ref 13–17)
MCHC RBC-ENTMCNC: 23.5 PG — LOW (ref 27–34)
MCHC RBC-ENTMCNC: 31.3 GM/DL — LOW (ref 32–36)
MCV RBC AUTO: 74.9 FL — LOW (ref 80–100)
METHOD TYPE: SIGNIFICANT CHANGE UP
NRBC # BLD: 0 /100 WBCS — SIGNIFICANT CHANGE UP (ref 0–0)
ORGANISM # SPEC MICROSCOPIC CNT: SIGNIFICANT CHANGE UP
PLATELET # BLD AUTO: 351 K/UL — SIGNIFICANT CHANGE UP (ref 150–400)
POTASSIUM SERPL-MCNC: 4.3 MMOL/L — SIGNIFICANT CHANGE UP (ref 3.5–5.3)
POTASSIUM SERPL-SCNC: 4.3 MMOL/L — SIGNIFICANT CHANGE UP (ref 3.5–5.3)
PROT SERPL-MCNC: 6.7 G/DL — SIGNIFICANT CHANGE UP (ref 6–8.3)
RBC # BLD: 4.47 M/UL — SIGNIFICANT CHANGE UP (ref 4.2–5.8)
RBC # FLD: 17.4 % — HIGH (ref 10.3–14.5)
SODIUM SERPL-SCNC: 133 MMOL/L — LOW (ref 135–145)
SPECIMEN SOURCE: SIGNIFICANT CHANGE UP
VANCOMYCIN TROUGH SERPL-MCNC: 20.9 UG/ML — HIGH (ref 10–20)
WBC # BLD: 12.39 K/UL — HIGH (ref 3.8–10.5)
WBC # FLD AUTO: 12.39 K/UL — HIGH (ref 3.8–10.5)

## 2020-07-19 PROCEDURE — 99232 SBSQ HOSP IP/OBS MODERATE 35: CPT

## 2020-07-19 RX ORDER — INSULIN LISPRO 100/ML
4 VIAL (ML) SUBCUTANEOUS
Refills: 0 | Status: DISCONTINUED | OUTPATIENT
Start: 2020-07-19 | End: 2020-07-22

## 2020-07-19 RX ORDER — INSULIN GLARGINE 100 [IU]/ML
12 INJECTION, SOLUTION SUBCUTANEOUS AT BEDTIME
Refills: 0 | Status: DISCONTINUED | OUTPATIENT
Start: 2020-07-19 | End: 2020-07-22

## 2020-07-19 RX ADMIN — Medication 81 MILLIGRAM(S): at 17:24

## 2020-07-19 RX ADMIN — OXYCODONE HYDROCHLORIDE 10 MILLIGRAM(S): 5 TABLET ORAL at 18:39

## 2020-07-19 RX ADMIN — OXYCODONE HYDROCHLORIDE 10 MILLIGRAM(S): 5 TABLET ORAL at 00:08

## 2020-07-19 RX ADMIN — Medication 4: at 12:22

## 2020-07-19 RX ADMIN — DULOXETINE HYDROCHLORIDE 90 MILLIGRAM(S): 30 CAPSULE, DELAYED RELEASE ORAL at 11:00

## 2020-07-19 RX ADMIN — ATORVASTATIN CALCIUM 40 MILLIGRAM(S): 80 TABLET, FILM COATED ORAL at 22:25

## 2020-07-19 RX ADMIN — OXYCODONE HYDROCHLORIDE 10 MILLIGRAM(S): 5 TABLET ORAL at 01:00

## 2020-07-19 RX ADMIN — POLYETHYLENE GLYCOL 3350 17 GRAM(S): 17 POWDER, FOR SOLUTION ORAL at 10:56

## 2020-07-19 RX ADMIN — OXYCODONE HYDROCHLORIDE 10 MILLIGRAM(S): 5 TABLET ORAL at 17:24

## 2020-07-19 RX ADMIN — OXYCODONE HYDROCHLORIDE 10 MILLIGRAM(S): 5 TABLET ORAL at 11:50

## 2020-07-19 RX ADMIN — OXYCODONE HYDROCHLORIDE 10 MILLIGRAM(S): 5 TABLET ORAL at 10:55

## 2020-07-19 RX ADMIN — INSULIN GLARGINE 12 UNIT(S): 100 INJECTION, SOLUTION SUBCUTANEOUS at 22:25

## 2020-07-19 RX ADMIN — Medication 6: at 07:48

## 2020-07-19 RX ADMIN — Medication 8: at 17:25

## 2020-07-19 RX ADMIN — Medication 25 MILLIGRAM(S): at 05:44

## 2020-07-19 RX ADMIN — Medication 166.67 MILLIGRAM(S): at 23:20

## 2020-07-19 RX ADMIN — Medication 12: at 21:25

## 2020-07-19 RX ADMIN — PANTOPRAZOLE SODIUM 40 MILLIGRAM(S): 20 TABLET, DELAYED RELEASE ORAL at 05:45

## 2020-07-19 RX ADMIN — Medication 81 MILLIGRAM(S): at 05:44

## 2020-07-19 NOTE — PROGRESS NOTE ADULT - SUBJECTIVE AND OBJECTIVE BOX
INTERVAL HPI/OVERNIGHT EVENTS: Feels well. Lost IV access.     CONSTITUTIONAL:  Negative fever or chills, feels well, good appetite  EYES:  Negative  blurry vision or double vision  CARDIOVASCULAR:  Negative for chest pain or palpitations  RESPIRATORY:  Negative for cough, wheezing, or SOB   GASTROINTESTINAL:  Negative for nausea, vomiting, diarrhea, constipation, or abdominal pain  GENITOURINARY:  Negative frequency, urgency or dysuria  NEUROLOGIC:  No headache, confusion, dizziness, lightheadedness      ANTIBIOTICS/RELEVANT:    MEDICATIONS  (STANDING):  aspirin enteric coated 81 milliGRAM(s) Oral two times a day  atorvastatin 40 milliGRAM(s) Oral at bedtime  BACItracin   Ointment 1 Application(s) Topical daily  collagenase Ointment 1 Application(s) Topical daily  dextrose 5%. 1000 milliLiter(s) (50 mL/Hr) IV Continuous <Continuous>  dextrose 50% Injectable 12.5 Gram(s) IV Push once  dextrose 50% Injectable 25 Gram(s) IV Push once  dextrose 50% Injectable 25 Gram(s) IV Push once  digoxin     Tablet 0.125 milliGRAM(s) Oral daily  DULoxetine 90 milliGRAM(s) Oral daily  insulin lispro (HumaLOG) corrective regimen sliding scale   SubCutaneous Before meals and at bedtime  lactated ringers. 1000 milliLiter(s) (50 mL/Hr) IV Continuous <Continuous>  metoprolol succinate ER 25 milliGRAM(s) Oral daily  nystatin Powder 1 Application(s) Topical two times a day  pantoprazole    Tablet 40 milliGRAM(s) Oral before breakfast  polyethylene glycol 3350 17 Gram(s) Oral daily  prasugrel 10 milliGRAM(s) Oral daily  rifAMPin IVPB 600 milliGRAM(s) IV Intermittent every 24 hours  tamsulosin Oral Tab/Cap - Peds 0.4 milliGRAM(s) Oral at bedtime  vancomycin  IVPB 1250 milliGRAM(s) IV Intermittent every 12 hours    MEDICATIONS  (PRN):  acetaminophen   Tablet .. 975 milliGRAM(s) Oral every 8 hours PRN Temp greater or equal to 38C (100.4F), Mild Pain (1 - 3)  aluminum hydroxide/magnesium hydroxide/simethicone Suspension 30 milliLiter(s) Oral four times a day PRN Indigestion  bisacodyl Suppository 10 milliGRAM(s) Rectal daily PRN If no bowel movement by postoperative day #2  dextrose 40% Gel 15 Gram(s) Oral once PRN Blood Glucose LESS THAN 70 milliGRAM(s)/deciliter  glucagon  Injectable 1 milliGRAM(s) IntraMuscular once PRN Glucose LESS THAN 70 milligrams/deciliter  HYDROmorphone  Injectable 0.5 milliGRAM(s) IV Push every 15 minutes PRN Breakthrough Pain  magnesium hydroxide Suspension 30 milliLiter(s) Oral daily PRN Constipation  ondansetron Injectable 4 milliGRAM(s) IV Push every 6 hours PRN Nausea and/or Vomiting  oxyCODONE    IR 5 milliGRAM(s) Oral every 4 hours PRN Moderate Pain (4 - 6)  oxyCODONE    IR 10 milliGRAM(s) Oral every 4 hours PRN Severe Pain (7 - 10)  senna 2 Tablet(s) Oral at bedtime PRN Constipation        Vital Signs Last 24 Hrs  T(C): 36.5 (19 Jul 2020 15:00), Max: 36.5 (18 Jul 2020 17:35)  T(F): 97.7 (19 Jul 2020 15:00), Max: 97.7 (18 Jul 2020 17:35)  HR: 75 (19 Jul 2020 15:00) (75 - 89)  BP: 100/56 (19 Jul 2020 15:00) (97/52 - 118/70)  BP(mean): --  RR: 17 (19 Jul 2020 15:00) (16 - 18)  SpO2: 95% (19 Jul 2020 15:00) (95% - 98%)    PHYSICAL EXAM:  Constitutional:Well-developed, well nourished  Eyes:DAMARIS, EOMI  Ear/Nose/Throat: no oral lesion, no sinus tenderness on percussion	  Neck:no JVD, no lymphadenopathy, supple  Respiratory: CTA colton  Cardiovascular: S1S2 RRR, no murmurs  Gastrointestinal:soft, (+) BS, no HSM  Extremities:no e/e/c  Vascular: DP Pulse:	right normal; left normal      LABS:                        10.5   12.39 )-----------( 351      ( 19 Jul 2020 11:33 )             33.5     07-19    133<L>  |  98  |  35<H>  ----------------------------<  240<H>  4.3   |  25  |  0.93    Ca    9.2      19 Jul 2020 11:33    TPro  6.7  /  Alb  3.3  /  TBili  1.0  /  DBili  0.4<H>  /  AST  53<H>  /  ALT  53<H>  /  AlkPhos  119  07-19          MICROBIOLOGY: Culture - Blood (07.16.20 @ 22:00)    -  Oxacillin: R >2    -  Trimethoprim/Sulfamethoxazole: R >2/38    -  RIF- Rifampin: S <=1 Should not be used as monotherapy    -  Tetra/Doxy: S <=1    -  Erythromycin: R >4    -  Clindamycin: S <=0.25    -  Daptomycin: S 0.5    -  Vancomycin: S 1    -  Vancomycin: S 1.5    Gram Stain:   Aerobic Bottle: Gram Positive Cocci in Clusters  Result called to and read back byCuauhtemoc Cortes RN ([facility])  07/17/2020  18:32:43  Anaerobic Bottle: Gram Positive Cocci in Clusters  Floor previously notified.  ***Blood Panel PCR results on this specimen are available  approximately 3 hours after the Gram stain result.***  Gram stain, PCR, and/or culture results may not always  correspond due to difference in methodologies.  ************************************************************  This PCR assay was performed using [employer].  The following targets are tested for: Enterococcus,  vancomycin resistant enterococci, Listeria monocytogenes,  coagulase negative staphylococci, S. aureus,  methicillin resistant S. aureus, Streptococcus agalactiae  (Group B), S. pneumoniae, S. pyogenes (Group A),  Acinetobacter baumannii, Enterobacter cloacae, E. coli,  Klebsiella oxytoca, K. pneumoniae, Proteus sp.,  Serratia marcescens, Haemophilus influenzae,  Neisseria meningitidis, Pseudomonas aeruginosa, Candida  albicans, C. glabrata, C krusei, C parapsilosis,  C. tropicalis and the KPC resistance gene.    -  Methicillin resistant Staphylococcus aureus (MRSA): Sameer Jones RN    -  Cefazolin: R 16    -  Linezolid: S 2    Specimen Source: .Blood Blood-Peripheral    Organism: Methicillin resistant Staphylococcus aureus    Organism: Methicillin resistant Staphylococcus aureus    Organism: Blood Culture PCR    Culture Results:   Growth in aerobic and anaerobic bottles: Methicillin resistant  Staphylococcus aureus  Floor previously notified.    Organism Identification: Methicillin resistant Staphylococcus aureus  Methicillin resistant Staphylococcus aureus  Blood Culture PCR    Method Type: PCR    Method Type: YOLIE    Method Type: ETEST        RADIOLOGY & ADDITIONAL STUDIES: reviewed

## 2020-07-19 NOTE — PROGRESS NOTE ADULT - SUBJECTIVE AND OBJECTIVE BOX
Interval Events: Reviewed  Patient seen and examined at bedside.    Patient is a 65y old  Male who presents with a chief complaint of R periprosthetic joint infection (18 Jul 2020 08:21)  Pt awake, pain controlled. No nausea, no fever, no chills, no chest pain, shortness of breath.      PAST MEDICAL & SURGICAL HISTORY:  Diabetic neuropathy  STEMI (ST elevation myocardial infarction)  Diverticulitis  MRSA bacteremia  History of celiac disease  CHF (congestive heart failure): EF ~ 25%  HTN (hypertension)  Diabetes: on insulin pump  Blood clot due to device, implant, or graft: was on blood thinners  HLD (hyperlipidemia)  Osteoarthritis  Atherosclerosis of coronary artery: CAD (coronary artery disease)  Status post percutaneous transluminal coronary angioplasty: in 2012  History of open reduction and internal fixation (ORIF) procedure  Surgery, elective: Right shoulder  Surgery, elective: right knee wound debridement  S/P TKR (total knee replacement), right: with infection Mrsa   per pt he was cleared from MRSA infection  S/P CABG x 1: 2018  Stented coronary artery: 10/18 heart attack  INFERIOR WALL MI  Other postprocedural status: Fixation hardware in foot LEFT      MEDICATIONS:  Pulmonary:    Antimicrobials:  rifAMPin IVPB 600 milliGRAM(s) IV Intermittent every 24 hours  vancomycin  IVPB 1250 milliGRAM(s) IV Intermittent every 12 hours    Anticoagulants:  aspirin enteric coated 81 milliGRAM(s) Oral two times a day  prasugrel 10 milliGRAM(s) Oral daily    Cardiac:  digoxin     Tablet 0.125 milliGRAM(s) Oral daily  metoprolol succinate ER 25 milliGRAM(s) Oral daily  tamsulosin Oral Tab/Cap - Peds 0.4 milliGRAM(s) Oral at bedtime      Allergies    ACE inhibitors (Hives)  carvedilol (Other)  enalapril (Hives)  Entresto (Other)    Intolerances        Vital Signs Last 24 Hrs  T(C): 36.4 (19 Jul 2020 09:09), Max: 36.8 (18 Jul 2020 14:53)  T(F): 97.6 (19 Jul 2020 09:09), Max: 98.3 (18 Jul 2020 14:53)  HR: 85 (19 Jul 2020 09:09) (80 - 97)  BP: 97/52 (19 Jul 2020 09:09) (92/51 - 124/72)  BP(mean): --  RR: 16 (19 Jul 2020 09:09) (16 - 18)  SpO2: 98% (19 Jul 2020 09:09) (95% - 99%)    07-18 @ 07:01  -  07-19 @ 07:00  --------------------------------------------------------  IN: 1390 mL / OUT: 1750 mL / NET: -360 mL    07-19 @ 07:01  - 07-19 @ 09:16  --------------------------------------------------------  IN: 0 mL / OUT: 300 mL / NET: -300 mL          Review of Systems:   •	General: negative  •	Skin/Breast: negative  •	Ophthalmologic: negative  •	ENMT: negative  •	Respiratory and Thorax: negative  •	Cardiovascular: negative  •	Gastrointestinal: negative  •	Genitourinary: negative  •	Musculoskeletal: negative  •	Neurological: negative  •	Psychiatric: negative  •	Hematology/Lymphatics: negative  •	Endocrine: negative  •	Allergic/Immunologic: negative    Physical Exam:   • Constitutional:	Well-developed, well nourished  • Eyes:	EOMI; PERRL; no drainage or redness  • ENMT:	No oral lesions; no gross abnormalities  • Neck	no thyromegaly or nodules  • Breasts:	not examined  • Back:	No deformity or limitation of movement  • Respiratory:	Breath Sounds equal & clear to auscultation, no accessory muscle use  • Cardiovascular:	Regular rate & rhythm, normal S1, S2; no murmurs, gallops or rubs; no S3, S4  • Gastrointestinal:	Soft, non-tender, no hepatosplenomegaly, normal bowel sounds  • Genitourinary:	not examined  • Rectal: not examined  • Extremities:	No cyanosis, clubbing or edema, bilateral lower leg dressing intact,   • Vascular:	doppler DP/PT per Vascular  • Neurologica:l	not examined  • Skin:	No lesions; no rash  • Lymph Nodes:	No lymphadedenopathy  • Musculoskeletal:	No joint pain, swelling or deformity; no limitation of movement        LABS:      CBC Full  -  ( 18 Jul 2020 07:56 )  WBC Count : 13.44 K/uL  RBC Count : 4.82 M/uL  Hemoglobin : 11.4 g/dL  Hematocrit : 36.9 %  Platelet Count - Automated : 338 K/uL  Mean Cell Volume : 76.6 fl  Mean Cell Hemoglobin : 23.7 pg  Mean Cell Hemoglobin Concentration : 30.9 gm/dL  Auto Neutrophil # : x  Auto Lymphocyte # : x  Auto Monocyte # : x  Auto Eosinophil # : x  Auto Basophil # : x  Auto Neutrophil % : x  Auto Lymphocyte % : x  Auto Monocyte % : x  Auto Eosinophil % : x  Auto Basophil % : x    07-18    131<L>  |  93<L>  |  32<H>  ----------------------------<  340<H>  4.6   |  16<L>  |  0.87    Ca    9.2      18 Jul 2020 07:56                      Culture Results:   Testing in progress (07-17 @ 22:23)  Culture Results:   Testing in progress (07-17 @ 22:21)  Culture Results:   Few Staphylococcus aureus presumptive Methicillin resistant  Confirmation to follow within 24 hours  Susceptibility to follow.  Floor previously notified. (07-17 @ 14:37)  Culture Results:   Rare Staphylococcus aureus Susceptibility to follow.  Culture in progress (07-17 @ 14:35)      RADIOLOGY & ADDITIONAL STUDIES (The following images were personally reviewed):  Loja:                                     No  Urine output:                       adequate  DVT prophylaxis:                 Yes  Flattus:                                  Yes  Bowel movement:              No

## 2020-07-19 NOTE — PROGRESS NOTE ADULT - SUBJECTIVE AND OBJECTIVE BOX
Pt comfortable without complaints, pain controlled  Denies CP, SOB, N/V, numbness/tingling     Vital Signs Last 24 Hrs  T(C): 36.4 (19 Jul 2020 09:09), Max: 36.8 (18 Jul 2020 14:53)  T(F): 97.6 (19 Jul 2020 09:09), Max: 98.3 (18 Jul 2020 14:53)  HR: 85 (19 Jul 2020 09:09) (80 - 97)  BP: 97/52 (19 Jul 2020 09:09) (92/51 - 118/70)  BP(mean): --  RR: 16 (19 Jul 2020 09:09) (16 - 18)  SpO2: 98% (19 Jul 2020 09:09) (95% - 98%)    General: Pt Alert and oriented, NAD  DSG C/D/I on R knee  Pulses: 2+  Sensation: SILT bilaterally distally, slightly diminished 2/2 diabetic neuropathy  Motor: EHL/FHL/TA/GS 5/5 RLE                          11.6   14.12 )-----------( 301      ( 17 Jul 2020 07:09 )             37.8   17 Jul 2020 07:09    132    |  93     |  20     ----------------------------<  211    4.6     |  21     |  0.74     Phos  3.6       17 Jul 2020 07:09  Mg     2.2       17 Jul 2020 07:09    TPro  7.2    /  Alb  3.4    /  TBili  0.9    /  DBili  x      /  AST  35     /  ALT  34     /  AlkPhos  130    16 Jul 2020 19:46    A/P: 65yMale POD#2 s/p R Arthroscopic I&D for periprosthetic infection  - Stable  - Pain Control  - Multi podus boots for ulcer ppx  - DVT ppx: ASA  - Drain O/P 30 mL overnight with purulence  - Post op abx: Vanco/Zosyn  - F/u ID Recs: Surveillance Blood cultures today for PICC line later this week  - F/u Vascular Recs  - PT, WBS: WBAT    Ortho Pager [phone]

## 2020-07-19 NOTE — PROGRESS NOTE ADULT - SUBJECTIVE AND OBJECTIVE BOX
Patient seen and examined at the bedside. No complaints.    rifAMPin IVPB 600  vancomycin  IVPB 1250  aspirin enteric coated 81  digoxin     Tablet 0.125  metoprolol succinate ER 25  prasugrel 10  rifAMPin IVPB 600  tamsulosin Oral Tab/Cap - Peds 0.4  vancomycin  IVPB 1250        Vital Signs Last 24 Hrs  T(C): 36.5 (19 Jul 2020 15:00), Max: 36.5 (18 Jul 2020 17:35)  T(F): 97.7 (19 Jul 2020 15:00), Max: 97.7 (18 Jul 2020 17:35)  HR: 75 (19 Jul 2020 15:00) (75 - 89)  BP: 100/56 (19 Jul 2020 15:00) (97/52 - 118/70)  BP(mean): --  RR: 17 (19 Jul 2020 15:00) (16 - 18)  SpO2: 95% (19 Jul 2020 15:00) (95% - 98%)  I&O's Summary    18 Jul 2020 07:01  -  19 Jul 2020 07:00  --------------------------------------------------------  IN: 1390 mL / OUT: 1750 mL / NET: -360 mL    19 Jul 2020 07:01  -  19 Jul 2020 15:29  --------------------------------------------------------  IN: 0 mL / OUT: 300 mL / NET: -300 mL        Physical Exam:  General: NAD, resting comfortably in bed  Pulmonary: Nonlabored breathing, no respiratory distress  Abdominal: abdomen soft and nontender  Ext: LLE- shin ulcer with fibrinous exudate on medial    LABS:                        10.5   12.39 )-----------( 351      ( 19 Jul 2020 11:33 )             33.5     07-19    133<L>  |  98  |  35<H>  ----------------------------<  240<H>  4.3   |  25  |  0.93    Ca    9.2      19 Jul 2020 11:33    TPro  6.7  /  Alb  3.3  /  TBili  1.0  /  DBili  0.4<H>  /  AST  53<H>  /  ALT  53<H>  /  AlkPhos  119  07-19

## 2020-07-20 DIAGNOSIS — Z95.5 PRESENCE OF CORONARY ANGIOPLASTY IMPLANT AND GRAFT: ICD-10-CM

## 2020-07-20 LAB
ALBUMIN SERPL ELPH-MCNC: 2.7 G/DL — LOW (ref 3.3–5)
ALP SERPL-CCNC: 111 U/L — SIGNIFICANT CHANGE UP (ref 40–120)
ALT FLD-CCNC: 39 U/L — SIGNIFICANT CHANGE UP (ref 10–45)
ANION GAP SERPL CALC-SCNC: 12 MMOL/L — SIGNIFICANT CHANGE UP (ref 5–17)
ANION GAP SERPL CALC-SCNC: 9 MMOL/L — SIGNIFICANT CHANGE UP (ref 5–17)
AST SERPL-CCNC: 25 U/L — SIGNIFICANT CHANGE UP (ref 10–40)
BILIRUB SERPL-MCNC: 0.8 MG/DL — SIGNIFICANT CHANGE UP (ref 0.2–1.2)
BUN SERPL-MCNC: 22 MG/DL — SIGNIFICANT CHANGE UP (ref 7–23)
BUN SERPL-MCNC: 25 MG/DL — HIGH (ref 7–23)
CALCIUM SERPL-MCNC: 8.6 MG/DL — SIGNIFICANT CHANGE UP (ref 8.4–10.5)
CALCIUM SERPL-MCNC: 8.8 MG/DL — SIGNIFICANT CHANGE UP (ref 8.4–10.5)
CHLORIDE SERPL-SCNC: 100 MMOL/L — SIGNIFICANT CHANGE UP (ref 96–108)
CHLORIDE SERPL-SCNC: 96 MMOL/L — SIGNIFICANT CHANGE UP (ref 96–108)
CO2 SERPL-SCNC: 24 MMOL/L — SIGNIFICANT CHANGE UP (ref 22–31)
CO2 SERPL-SCNC: 25 MMOL/L — SIGNIFICANT CHANGE UP (ref 22–31)
CREAT SERPL-MCNC: 0.68 MG/DL — SIGNIFICANT CHANGE UP (ref 0.5–1.3)
CREAT SERPL-MCNC: 0.75 MG/DL — SIGNIFICANT CHANGE UP (ref 0.5–1.3)
CRP SERPL-MCNC: 7.23 MG/DL — HIGH (ref 0–0.4)
DIGOXIN SERPL-MCNC: <0.3 NG/ML — LOW (ref 0.8–2)
ERYTHROCYTE [SEDIMENTATION RATE] IN BLOOD: 60 MM/HR — HIGH
GLUCOSE BLDC GLUCOMTR-MCNC: 234 MG/DL — HIGH (ref 70–99)
GLUCOSE BLDC GLUCOMTR-MCNC: 265 MG/DL — HIGH (ref 70–99)
GLUCOSE BLDC GLUCOMTR-MCNC: 269 MG/DL — HIGH (ref 70–99)
GLUCOSE BLDC GLUCOMTR-MCNC: 280 MG/DL — HIGH (ref 70–99)
GLUCOSE BLDC GLUCOMTR-MCNC: 338 MG/DL — HIGH (ref 70–99)
GLUCOSE BLDC GLUCOMTR-MCNC: 403 MG/DL — HIGH (ref 70–99)
GLUCOSE SERPL-MCNC: 243 MG/DL — HIGH (ref 70–99)
GLUCOSE SERPL-MCNC: 288 MG/DL — HIGH (ref 70–99)
HCT VFR BLD CALC: 33.3 % — LOW (ref 39–50)
HGB BLD-MCNC: 10.4 G/DL — LOW (ref 13–17)
MCHC RBC-ENTMCNC: 24 PG — LOW (ref 27–34)
MCHC RBC-ENTMCNC: 31.2 GM/DL — LOW (ref 32–36)
MCV RBC AUTO: 76.9 FL — LOW (ref 80–100)
NRBC # BLD: 0 /100 WBCS — SIGNIFICANT CHANGE UP (ref 0–0)
NT-PROBNP SERPL-SCNC: 9916 PG/ML — HIGH (ref 0–300)
PLATELET # BLD AUTO: 330 K/UL — SIGNIFICANT CHANGE UP (ref 150–400)
POTASSIUM SERPL-MCNC: 3.9 MMOL/L — SIGNIFICANT CHANGE UP (ref 3.5–5.3)
POTASSIUM SERPL-MCNC: 4 MMOL/L — SIGNIFICANT CHANGE UP (ref 3.5–5.3)
POTASSIUM SERPL-SCNC: 3.9 MMOL/L — SIGNIFICANT CHANGE UP (ref 3.5–5.3)
POTASSIUM SERPL-SCNC: 4 MMOL/L — SIGNIFICANT CHANGE UP (ref 3.5–5.3)
PROT SERPL-MCNC: 6.2 G/DL — SIGNIFICANT CHANGE UP (ref 6–8.3)
RBC # BLD: 4.33 M/UL — SIGNIFICANT CHANGE UP (ref 4.2–5.8)
RBC # FLD: 17.4 % — HIGH (ref 10.3–14.5)
SODIUM SERPL-SCNC: 132 MMOL/L — LOW (ref 135–145)
SODIUM SERPL-SCNC: 134 MMOL/L — LOW (ref 135–145)
VANCOMYCIN TROUGH SERPL-MCNC: 17.7 UG/ML — SIGNIFICANT CHANGE UP (ref 10–20)
WBC # BLD: 9.9 K/UL — SIGNIFICANT CHANGE UP (ref 3.8–10.5)
WBC # FLD AUTO: 9.9 K/UL — SIGNIFICANT CHANGE UP (ref 3.8–10.5)

## 2020-07-20 PROCEDURE — 71045 X-RAY EXAM CHEST 1 VIEW: CPT | Mod: 26

## 2020-07-20 PROCEDURE — 99232 SBSQ HOSP IP/OBS MODERATE 35: CPT

## 2020-07-20 PROCEDURE — 99233 SBSQ HOSP IP/OBS HIGH 50: CPT

## 2020-07-20 PROCEDURE — 99232 SBSQ HOSP IP/OBS MODERATE 35: CPT | Mod: GC

## 2020-07-20 RX ORDER — INSULIN LISPRO 100/ML
5 VIAL (ML) SUBCUTANEOUS ONCE
Refills: 0 | Status: COMPLETED | OUTPATIENT
Start: 2020-07-20 | End: 2020-07-20

## 2020-07-20 RX ORDER — TAMSULOSIN HYDROCHLORIDE 0.4 MG/1
0.4 CAPSULE ORAL AT BEDTIME
Refills: 0 | Status: DISCONTINUED | OUTPATIENT
Start: 2020-07-20 | End: 2020-07-22

## 2020-07-20 RX ORDER — VANCOMYCIN HCL 1 G
1000 VIAL (EA) INTRAVENOUS EVERY 12 HOURS
Refills: 0 | Status: DISCONTINUED | OUTPATIENT
Start: 2020-07-20 | End: 2020-07-22

## 2020-07-20 RX ADMIN — Medication 1 APPLICATION(S): at 11:38

## 2020-07-20 RX ADMIN — Medication 81 MILLIGRAM(S): at 17:12

## 2020-07-20 RX ADMIN — OXYCODONE HYDROCHLORIDE 10 MILLIGRAM(S): 5 TABLET ORAL at 14:30

## 2020-07-20 RX ADMIN — Medication 166.67 MILLIGRAM(S): at 10:12

## 2020-07-20 RX ADMIN — Medication 250 MILLIGRAM(S): at 21:40

## 2020-07-20 RX ADMIN — Medication 4: at 07:49

## 2020-07-20 RX ADMIN — Medication 6: at 17:12

## 2020-07-20 RX ADMIN — OXYCODONE HYDROCHLORIDE 10 MILLIGRAM(S): 5 TABLET ORAL at 14:00

## 2020-07-20 RX ADMIN — DULOXETINE HYDROCHLORIDE 90 MILLIGRAM(S): 30 CAPSULE, DELAYED RELEASE ORAL at 11:38

## 2020-07-20 RX ADMIN — OXYCODONE HYDROCHLORIDE 10 MILLIGRAM(S): 5 TABLET ORAL at 10:40

## 2020-07-20 RX ADMIN — Medication 4 UNIT(S): at 11:48

## 2020-07-20 RX ADMIN — Medication 6: at 22:59

## 2020-07-20 RX ADMIN — Medication 25 MILLIGRAM(S): at 06:11

## 2020-07-20 RX ADMIN — OXYCODONE HYDROCHLORIDE 10 MILLIGRAM(S): 5 TABLET ORAL at 10:12

## 2020-07-20 RX ADMIN — Medication 6: at 11:48

## 2020-07-20 RX ADMIN — PRASUGREL 10 MILLIGRAM(S): 5 TABLET, FILM COATED ORAL at 11:38

## 2020-07-20 RX ADMIN — ATORVASTATIN CALCIUM 40 MILLIGRAM(S): 80 TABLET, FILM COATED ORAL at 21:32

## 2020-07-20 RX ADMIN — Medication 5 UNIT(S): at 00:39

## 2020-07-20 RX ADMIN — Medication 4 UNIT(S): at 17:12

## 2020-07-20 RX ADMIN — POLYETHYLENE GLYCOL 3350 17 GRAM(S): 17 POWDER, FOR SOLUTION ORAL at 11:38

## 2020-07-20 RX ADMIN — Medication 81 MILLIGRAM(S): at 06:11

## 2020-07-20 RX ADMIN — PANTOPRAZOLE SODIUM 40 MILLIGRAM(S): 20 TABLET, DELAYED RELEASE ORAL at 06:11

## 2020-07-20 RX ADMIN — TAMSULOSIN HYDROCHLORIDE 0.4 MILLIGRAM(S): 0.4 CAPSULE ORAL at 21:32

## 2020-07-20 RX ADMIN — Medication 4 UNIT(S): at 07:49

## 2020-07-20 RX ADMIN — INSULIN GLARGINE 12 UNIT(S): 100 INJECTION, SOLUTION SUBCUTANEOUS at 22:59

## 2020-07-20 NOTE — PROGRESS NOTE ADULT - SUBJECTIVE AND OBJECTIVE BOX
INTERVAL HISTORY:  Reduced knee pain. No dyspnea or chest pain	  Chart, [facility] medications and allergies reviewed    MEDICATIONS:  MEDICATIONS  (STANDING):  aspirin enteric coated 81 milliGRAM(s) Oral two times a day  atorvastatin 40 milliGRAM(s) Oral at bedtime  BACItracin   Ointment 1 Application(s) Topical daily  collagenase Ointment 1 Application(s) Topical daily  dextrose 5%. 1000 milliLiter(s) (50 mL/Hr) IV Continuous <Continuous>  dextrose 50% Injectable 12.5 Gram(s) IV Push once  dextrose 50% Injectable 25 Gram(s) IV Push once  dextrose 50% Injectable 25 Gram(s) IV Push once  digoxin     Tablet 0.125 milliGRAM(s) Oral daily  DULoxetine 90 milliGRAM(s) Oral daily  insulin glargine Injectable (LANTUS) 12 Unit(s) SubCutaneous at bedtime  insulin lispro (HumaLOG) corrective regimen sliding scale   SubCutaneous Before meals and at bedtime  insulin lispro Injectable (HumaLOG) 4 Unit(s) SubCutaneous three times a day before meals  lactated ringers. 1000 milliLiter(s) (50 mL/Hr) IV Continuous <Continuous>  metoprolol succinate ER 25 milliGRAM(s) Oral daily  nystatin Powder 1 Application(s) Topical two times a day  pantoprazole    Tablet 40 milliGRAM(s) Oral before breakfast  polyethylene glycol 3350 17 Gram(s) Oral daily  prasugrel 10 milliGRAM(s) Oral daily  tamsulosin Oral Tab/Cap - Peds 0.4 milliGRAM(s) Oral at bedtime  vancomycin  IVPB 1250 milliGRAM(s) IV Intermittent every 12 hours      REVIEW OF SYSTEMS:  CONSTITUTIONAL: No fever, no weight loss, no fatigue  PULMONARY: No cough, no wheezing, chills no hemoptysis; No Shortness of Breath  CARDIOVASCULAR: No chest pain, no palpitations, no syncope, dizziness, no leg swelling  GASTROINTESTINAL: No abdominal pain. No nausea, no  vomiting, no hematemesis; No diarrhea, no constipation. No melena, no hematochezia.  GENITOURINARY: No dysuria, no frequency, no hematuria, no incontinence  SKIN: No itching, no burning, no rashes, no lesions     PHYSICAL EXAM:  T(C): 36.6 (07-20-20 @ 09:00), Max: 37 (07-20-20 @ 06:04)  HR: 88 (07-20-20 @ 10:40) (75 - 90)  BP: 91/54 (07-20-20 @ 10:40) (91/54 - 124/71)  RR: 18 (07-20-20 @ 09:00) (16 - 18)  SpO2: 98% (07-20-20 @ 09:00) (95% - 98%)  Wt(kg): --  I&O's Summary    19 Jul 2020 07:01  -  20 Jul 2020 07:00  --------------------------------------------------------  IN: 350 mL / OUT: 3120 mL / NET: -2770 mL    20 Jul 2020 07:01  -  20 Jul 2020 14:11  --------------------------------------------------------  IN: 400 mL / OUT: 350 mL / NET: 50 mL        Appearance:  No deformities, normal appearance	  HEENT:   Normal oral mucosa, PERRL, EOMI	  Neck: No jvd , no masses  Lymphatic: No  lymphadenopathy  Pulmonary: Lungs clear to auscultation and percussion  Cardiovascular: Normal S1 S2, No JVD, No murmur, No edema	  Abdomen:  Soft, Non-tender, + BS  Skin: No rashes, No ecchymoses	  Extremities:  No clubbing or cyanosis  MSK: Normal range of motion, no joint swelling    LABS:		  CARDIAC MARKERS:                         10.4   9.90  )-----------( 330      ( 20 Jul 2020 06:51 )             33.3   07-20    132<L>  |  96  |  25<H>  ----------------------------<  243<H>  3.9   |  24  |  0.75    Ca    8.8      20 Jul 2020 06:51    TPro  6.2  /  Alb  2.7<L>  /  TBili  0.8  /  DBili  x   /  AST  25  /  ALT  39  /  AlkPhos  111  07-20    proBNP:  Lipid Profile:  HgA1c:  TSH:      Culture - Blood (collected 07-19-20 @ 15:11)  Source: .Blood Blood-Peripheral  Preliminary Report (07-20-20 @ 04:01):    No growth at 12 hours    Culture - Blood (collected 07-19-20 @ 15:11)  Source: .Blood Blood-Peripheral  Preliminary Report (07-20-20 @ 04:01):    No growth at 12 hours    Culture - Acid Fast - Tissue w/Smear (collected 07-17-20 @ 22:23)  Source: .Tissue Right Synovium #2    Culture - Fungal, Tissue (collected 07-17-20 @ 22:23)  Source: .Tissue Right Synovium #2  Preliminary Report (07-18-20 @ 15:21):    Testing in progress    Culture - Acid Fast - Body Fluid w/Smear (collected 07-17-20 @ 22:21)  Source: .Body Fluid Right Synovium #1, conical tube    Culture - Fungal, Body Fluid (collected 07-17-20 @ 22:21)  Source: .Body Fluid Right Synovium #1, conical tube  Preliminary Report (07-18-20 @ 15:12):    Testing in progress    Culture - Body Fluid with Gram Stain (collected 07-17-20 @ 14:37)  Source: .Body Fluid Right Synovium #1  Gram Stain (07-18-20 @ 11:42):    Few Gram Positive Cocci in Clusters    Numerous White blood cells  Final Report (07-19-20 @ 09:50):    Few Methicillin resistant Staphylococcus aureus    Floor previously notified.  Organism: Methicillin resistant Staphylococcus aureus  Methicillin resistant Staphylococcus aureus (07-19-20 @ 09:50)  Organism: Methicillin resistant Staphylococcus aureus (07-19-20 @ 09:50)      -  Cefazolin: R 8      -  Clindamycin: S <=0.25      -  Daptomycin: S 0.5      -  Erythromycin: R >4      -  Linezolid: S 2      -  Oxacillin: R >2      -  RIF- Rifampin: S <=1 Should not be used as monotherapy      -  Tetra/Doxy: S <=1      -  Trimethoprim/Sulfamethoxazole: R >2/38      Method Type: YOLIE  Organism: Methicillin resistant Staphylococcus aureus (07-19-20 @ 09:50)      -  Vancomycin: S 1.5      Method Type: ETEST    Culture - Tissue with Gram Stain (collected 07-17-20 @ 14:35)  Source: .Tissue Right Synovium #2  Gram Stain (07-17-20 @ 17:37):    No organisms seen    Rare WBC's  Final Report (07-19-20 @ 09:44):    Rare Methicillin resistant Staphylococcus aureus    Floor previously notified.  Organism: Methicillin resistant Staphylococcus aureus  Methicillin resistant Staphylococcus aureus (07-19-20 @ 09:44)  Organism: Methicillin resistant Staphylococcus aureus (07-19-20 @ 09:44)      -  Vancomycin: S 1.5      Method Type: ETEST  Organism: Methicillin resistant Staphylococcus aureus (07-19-20 @ 09:44)      -  Cefazolin: R >16      -  Clindamycin: S <=0.25      -  Daptomycin: S 0.5      -  Erythromycin: R >4      -  Linezolid: S 2      -  Oxacillin: R >2      -  RIF- Rifampin: S <=1 Should not be used as monotherapy      -  Tetra/Doxy: S <=1      -  Trimethoprim/Sulfamethoxazole: R >2/38      Method Type: YOLIE    Culture - Acid Fast - Body Fluid w/Smear (collected 07-17-20 @ 06:13)  Source: .Body Fluid knee  Preliminary Report (07-18-20 @ 15:03):    Culture is being performed.    Culture - Acid Fast - Body Fluid w/Smear (collected 07-17-20 @ 06:13)  Source: .Body Fluid knee aspirate  Preliminary Report (07-18-20 @ 15:03):    Culture is being performed.    Culture - Aspirate with Gram Stain (collected 07-17-20 @ 01:29)  Source: .Aspirate Knee Aspirate Fluid  Gram Stain (07-18-20 @ 12:22):    **Please Note**: This is a Corrected Report**    Previously reported as: No organisms seen Few WBC's    Result called to and read back byCuauhtemoc Murphy RN  07/18/2020 12:12:13  Final Report (07-19-20 @ 09:46):    Few-moderate Methicillin resistant Staphylococcus aureus    Floor previously notified.  Organism: Methicillin resistant Staphylococcus aureus  Methicillin resistant Staphylococcus aureus (07-19-20 @ 09:46)  Organism: Methicillin resistant Staphylococcus aureus (07-19-20 @ 09:46)      -  Cefazolin: R 8      -  Clindamycin: S <=0.25      -  Daptomycin: S 0.5      -  Erythromycin: R >4      -  Linezolid: S 2      -  Oxacillin: R >2      -  RIF- Rifampin: S <=1 Should not be used as monotherapy      -  Tetra/Doxy: S <=1      -  Trimethoprim/Sulfamethoxazole: R >2/38      Method Type: YOLIE  Organism: Methicillin resistant Staphylococcus aureus (07-19-20 @ 09:46)      -  Vancomycin: S 1.5      Method Type: ETEST    Culture - Aspirate with Gram Stain (collected 07-17-20 @ 01:29)  Source: .Aspirate Knee Aspirate Fluid  Gram Stain (07-18-20 @ 12:12):    **Please Note**: This is a Corrected Report**    Previously reported as: No organisms seen Few WBC's    Corrected : Gram Positive Cocci in Clusters Moderate WBC's    Result called to and read back byCuauhtemoc Murphy RN  07/18/2020 12:12:13  Final Report (07-19-20 @ 09:47):    Moderate Methicillin resistant Staphylococcus aureus    Floor previously notified.  Organism: Methicillin resistant Staphylococcus aureus  Methicillin resistant Staphylococcus aureus (07-19-20 @ 09:47)  Organism: Methicillin resistant Staphylococcus aureus (07-19-20 @ 09:47)      -  Cefazolin: R 16      -  Clindamycin: S <=0.25      -  Daptomycin: S 0.5      -  Erythromycin: R >4      -  Linezolid: S 4      -  Oxacillin: R >2      -  RIF- Rifampin: S <=1 Should not be used as monotherapy      -  Tetra/Doxy: S <=1      -  Trimethoprim/Sulfamethoxazole: R >2/38      Method Type: YOLIE  Organism: Methicillin resistant Staphylococcus aureus (07-19-20 @ 09:47)      -  Vancomycin: S 1.5      Method Type: ETEST    Culture - Blood (collected 07-16-20 @ 22:00)  Source: .Blood Blood-Peripheral  Gram Stain (07-17-20 @ 19:11):    Aerobic Bottle: Gram Positive Cocci in Clusters    Result called to and read back byCuauhtemoc Cortes RN ([facility])  07/17/2020    18:32:43    Anaerobic Bottle: Gram Positive Cocci in Clusters    Floor previously notified.    ***Blood Panel PCR results on this specimen are available    approximately 3 hours after the Gram stain result.***    Gram stain, PCR, and/or culture results may not always    correspond due to difference in methodologies.    ************************************************************    This PCR assay was performed using [employer].    The following targets are tested for: Enterococcus,    vancomycin resistant enterococci, Listeria monocytogenes,    coagulase negative staphylococci, S. aureus,    methicillin resistant S. aureus, Streptococcus agalactiae    (Group B), S. pneumoniae, S. pyogenes (Group A),    Acinetobacter baumannii, Enterobacter cloacae, E. coli,    Klebsiella oxytoca, K. pneumoniae, Proteus sp.,    Serratia marcescens, Haemophilus influenzae,    Neisseria meningitidis, Pseudomonas aeruginosa, Candida    albicans, C. glabrata, C krusei, C parapsilosis,    C. tropicalis and the KPC resistance gene.  Final Report (07-19-20 @ 09:03):    Growth in aerobic and anaerobic bottles: Methicillin resistant    Staphylococcus aureus    Floor previously notified.  Organism: Methicillin resistant Staphylococcus aureus  Methicillin resistant Staphylococcus aureus  Blood Culture PCR (07-19-20 @ 09:03)  Organism: Blood Culture PCR (07-19-20 @ 09:03)      -  Methicillin resistant Staphylococcus aureus (MRSA): Detec Cindi Jones RN      Method Type: PCR  Organism: Methicillin resistant Staphylococcus aureus (07-19-20 @ 09:03)      -  Vancomycin: S 1.5      Method Type: ETEST  Organism: Methicillin resistant Staphylococcus aureus (07-19-20 @ 09:03)      -  Cefazolin: R 16      -  Clindamycin: S <=0.25      -  Daptomycin: S 0.5      -  Erythromycin: R >4      -  Linezolid: S 2      -  Oxacillin: R >2      -  RIF- Rifampin: S <=1 Should not be used as monotherapy      -  Tetra/Doxy: S <=1      -  Trimethoprim/Sulfamethoxazole: R >2/38      -  Vancomycin: S 1      Method Type: YOLIE    Culture - Blood (collected 07-16-20 @ 22:00)  Source: .Blood Blood-Peripheral  Gram Stain (07-17-20 @ 19:45):    Aerobic Bottle: Gram Positive Cocci in Clusters    Result called to and read back byCuauhtemoc Redmond RN ([id number])  07/17/2020    19:01:18    Anaerobic Bottle: Gram Positive Cocci in Clusters    Floor previously notified.  Final Report (07-19-20 @ 08:54):    Growth in aerobic and anaerobic bottles: Methicillin resistant    Staphylococcus aureus  Organism: Methicillin resistant Staphylococcus aureus  Methicillin resistant Staphylococcus aureus (07-19-20 @ 08:55)  Organism: Methicillin resistant Staphylococcus aureus (07-19-20 @ 08:55)      -  Vancomycin: S 1.5      Method Type: ETEST  Organism: Methicillin resistant Staphylococcus aureus (07-19-20 @ 08:55)      -  Cefazolin: R 16      -  Clindamycin: S <=0.25      -  Daptomycin: S 0.5      -  Erythromycin: R >4      -  Linezolid: S 2      -  Oxacillin: R >2      -  RIF- Rifampin: S <=1 Should not be used as monotherapy      -  Tetra/Doxy: S <=1      -  Trimethoprim/Sulfamethoxazole: R >2/38      -  Vancomycin: S 1      Method Type: YOLIE    Culture - Urine (collected 07-16-20 @ 21:12)  Source: .Urine Clean Catch (Midstream)  Final Report (07-17-20 @ 15:13):    No growth      TELEMETRY: A sense V pace 5 beat VT	      QTC     ECG:  	   QTC         RADIOLOGY:   DIAGNOSTIC TESTING: [x ] Echocardiogram: [ ]  Catheterization: [ ] Stress Test:      ASSESSMENT/PLAN: 	  MRSA sepsis- patient is at risk for infected leads. Recommend JOHN PAUL.    Severely reduced ejection fraction-the patient takes diuretics occasionally for weight gain. He is sedentary however denies dyspnea. His pulmonary pressures are high on echo. Please check BNP and chest x-ray. Rx metoprolol 25mg daily and digoxin. Check digoxin level.     Coronary artery disease-the patient denies chest discomfort. His EKG is uninterpretable secondary to the pacemaker. He was revascularized less than two years ago. There is no clinical evidence for ischemia. Rx aspirin and atorvastatin.  Rx prasugrel if possible.     Defibrillator-short runs of ventricular tachycardia. No treatment necessary.      Discussed with resident. INTERVAL HISTORY:  Reduced knee pain. No dyspnea or chest pain	  Chart, [facility] medications and allergies reviewed    MEDICATIONS:  MEDICATIONS  (STANDING):  aspirin enteric coated 81 milliGRAM(s) Oral two times a day  atorvastatin 40 milliGRAM(s) Oral at bedtime  BACItracin   Ointment 1 Application(s) Topical daily  collagenase Ointment 1 Application(s) Topical daily  dextrose 5%. 1000 milliLiter(s) (50 mL/Hr) IV Continuous <Continuous>  dextrose 50% Injectable 12.5 Gram(s) IV Push once  dextrose 50% Injectable 25 Gram(s) IV Push once  dextrose 50% Injectable 25 Gram(s) IV Push once  digoxin     Tablet 0.125 milliGRAM(s) Oral daily  DULoxetine 90 milliGRAM(s) Oral daily  insulin glargine Injectable (LANTUS) 12 Unit(s) SubCutaneous at bedtime  insulin lispro (HumaLOG) corrective regimen sliding scale   SubCutaneous Before meals and at bedtime  insulin lispro Injectable (HumaLOG) 4 Unit(s) SubCutaneous three times a day before meals  lactated ringers. 1000 milliLiter(s) (50 mL/Hr) IV Continuous <Continuous>  metoprolol succinate ER 25 milliGRAM(s) Oral daily  nystatin Powder 1 Application(s) Topical two times a day  pantoprazole    Tablet 40 milliGRAM(s) Oral before breakfast  polyethylene glycol 3350 17 Gram(s) Oral daily  prasugrel 10 milliGRAM(s) Oral daily  tamsulosin Oral Tab/Cap - Peds 0.4 milliGRAM(s) Oral at bedtime  vancomycin  IVPB 1250 milliGRAM(s) IV Intermittent every 12 hours      REVIEW OF SYSTEMS:  CONSTITUTIONAL: No fever, no weight loss, no fatigue  PULMONARY: No cough, no wheezing, chills no hemoptysis; No Shortness of Breath  CARDIOVASCULAR: No chest pain, no palpitations, no syncope, dizziness, no leg swelling  GASTROINTESTINAL: No abdominal pain. No nausea, no  vomiting, no hematemesis; No diarrhea, no constipation. No melena, no hematochezia.  GENITOURINARY: No dysuria, no frequency, no hematuria, no incontinence  SKIN: No itching, no burning, no rashes, no lesions     PHYSICAL EXAM:  T(C): 36.6 (07-20-20 @ 09:00), Max: 37 (07-20-20 @ 06:04)  HR: 88 (07-20-20 @ 10:40) (75 - 90)  BP: 91/54 (07-20-20 @ 10:40) (91/54 - 124/71)  RR: 18 (07-20-20 @ 09:00) (16 - 18)  SpO2: 98% (07-20-20 @ 09:00) (95% - 98%)  Wt(kg): --  I&O's Summary    19 Jul 2020 07:01  -  20 Jul 2020 07:00  --------------------------------------------------------  IN: 350 mL / OUT: 3120 mL / NET: -2770 mL    20 Jul 2020 07:01  -  20 Jul 2020 14:11  --------------------------------------------------------  IN: 400 mL / OUT: 350 mL / NET: 50 mL        Appearance:  No deformities, normal appearance	  HEENT:   Normal oral mucosa, PERRL, EOMI	  Neck: No jvd , no masses  Lymphatic: No  lymphadenopathy  Pulmonary: Lungs clear to auscultation and percussion  Cardiovascular: Normal S1 S2, No JVD, No murmur, No edema	  Abdomen:  Soft, Non-tender, + BS  Skin: No rashes, No ecchymoses	  Extremities:  No clubbing or cyanosis  MSK: Normal range of motion, no joint swelling    LABS:		  CARDIAC MARKERS:                         10.4   9.90  )-----------( 330      ( 20 Jul 2020 06:51 )             33.3   07-20    132<L>  |  96  |  25<H>  ----------------------------<  243<H>  3.9   |  24  |  0.75    Ca    8.8      20 Jul 2020 06:51    TPro  6.2  /  Alb  2.7<L>  /  TBili  0.8  /  DBili  x   /  AST  25  /  ALT  39  /  AlkPhos  111  07-20    proBNP:  Lipid Profile:  HgA1c:  TSH:      Culture - Blood (collected 07-19-20 @ 15:11)  Source: .Blood Blood-Peripheral  Preliminary Report (07-20-20 @ 04:01):    No growth at 12 hours    Culture - Blood (collected 07-19-20 @ 15:11)  Source: .Blood Blood-Peripheral  Preliminary Report (07-20-20 @ 04:01):    No growth at 12 hours    Culture - Acid Fast - Tissue w/Smear (collected 07-17-20 @ 22:23)  Source: .Tissue Right Synovium #2    Culture - Fungal, Tissue (collected 07-17-20 @ 22:23)  Source: .Tissue Right Synovium #2  Preliminary Report (07-18-20 @ 15:21):    Testing in progress    Culture - Acid Fast - Body Fluid w/Smear (collected 07-17-20 @ 22:21)  Source: .Body Fluid Right Synovium #1, conical tube    Culture - Fungal, Body Fluid (collected 07-17-20 @ 22:21)  Source: .Body Fluid Right Synovium #1, conical tube  Preliminary Report (07-18-20 @ 15:12):    Testing in progress    Culture - Body Fluid with Gram Stain (collected 07-17-20 @ 14:37)  Source: .Body Fluid Right Synovium #1  Gram Stain (07-18-20 @ 11:42):    Few Gram Positive Cocci in Clusters    Numerous White blood cells  Final Report (07-19-20 @ 09:50):    Few Methicillin resistant Staphylococcus aureus    Floor previously notified.  Organism: Methicillin resistant Staphylococcus aureus  Methicillin resistant Staphylococcus aureus (07-19-20 @ 09:50)  Organism: Methicillin resistant Staphylococcus aureus (07-19-20 @ 09:50)      -  Cefazolin: R 8      -  Clindamycin: S <=0.25      -  Daptomycin: S 0.5      -  Erythromycin: R >4      -  Linezolid: S 2      -  Oxacillin: R >2      -  RIF- Rifampin: S <=1 Should not be used as monotherapy      -  Tetra/Doxy: S <=1      -  Trimethoprim/Sulfamethoxazole: R >2/38      Method Type: YOLIE  Organism: Methicillin resistant Staphylococcus aureus (07-19-20 @ 09:50)      -  Vancomycin: S 1.5      Method Type: ETEST    Culture - Tissue with Gram Stain (collected 07-17-20 @ 14:35)  Source: .Tissue Right Synovium #2  Gram Stain (07-17-20 @ 17:37):    No organisms seen    Rare WBC's  Final Report (07-19-20 @ 09:44):    Rare Methicillin resistant Staphylococcus aureus    Floor previously notified.  Organism: Methicillin resistant Staphylococcus aureus  Methicillin resistant Staphylococcus aureus (07-19-20 @ 09:44)  Organism: Methicillin resistant Staphylococcus aureus (07-19-20 @ 09:44)      -  Vancomycin: S 1.5      Method Type: ETEST  Organism: Methicillin resistant Staphylococcus aureus (07-19-20 @ 09:44)      -  Cefazolin: R >16      -  Clindamycin: S <=0.25      -  Daptomycin: S 0.5      -  Erythromycin: R >4      -  Linezolid: S 2      -  Oxacillin: R >2      -  RIF- Rifampin: S <=1 Should not be used as monotherapy      -  Tetra/Doxy: S <=1      -  Trimethoprim/Sulfamethoxazole: R >2/38      Method Type: YOLIE    Culture - Acid Fast - Body Fluid w/Smear (collected 07-17-20 @ 06:13)  Source: .Body Fluid knee  Preliminary Report (07-18-20 @ 15:03):    Culture is being performed.    Culture - Acid Fast - Body Fluid w/Smear (collected 07-17-20 @ 06:13)  Source: .Body Fluid knee aspirate  Preliminary Report (07-18-20 @ 15:03):    Culture is being performed.    Culture - Aspirate with Gram Stain (collected 07-17-20 @ 01:29)  Source: .Aspirate Knee Aspirate Fluid  Gram Stain (07-18-20 @ 12:22):    **Please Note**: This is a Corrected Report**    Previously reported as: No organisms seen Few WBC's    Result called to and read back byCuauhtemoc Murphy RN  07/18/2020 12:12:13  Final Report (07-19-20 @ 09:46):    Few-moderate Methicillin resistant Staphylococcus aureus    Floor previously notified.  Organism: Methicillin resistant Staphylococcus aureus  Methicillin resistant Staphylococcus aureus (07-19-20 @ 09:46)  Organism: Methicillin resistant Staphylococcus aureus (07-19-20 @ 09:46)      -  Cefazolin: R 8      -  Clindamycin: S <=0.25      -  Daptomycin: S 0.5      -  Erythromycin: R >4      -  Linezolid: S 2      -  Oxacillin: R >2      -  RIF- Rifampin: S <=1 Should not be used as monotherapy      -  Tetra/Doxy: S <=1      -  Trimethoprim/Sulfamethoxazole: R >2/38      Method Type: YOLIE  Organism: Methicillin resistant Staphylococcus aureus (07-19-20 @ 09:46)      -  Vancomycin: S 1.5      Method Type: ETEST    Culture - Aspirate with Gram Stain (collected 07-17-20 @ 01:29)  Source: .Aspirate Knee Aspirate Fluid  Gram Stain (07-18-20 @ 12:12):    **Please Note**: This is a Corrected Report**    Previously reported as: No organisms seen Few WBC's    Corrected : Gram Positive Cocci in Clusters Moderate WBC's    Result called to and read back byCuauhtemoc Murphy RN  07/18/2020 12:12:13  Final Report (07-19-20 @ 09:47):    Moderate Methicillin resistant Staphylococcus aureus    Floor previously notified.  Organism: Methicillin resistant Staphylococcus aureus  Methicillin resistant Staphylococcus aureus (07-19-20 @ 09:47)  Organism: Methicillin resistant Staphylococcus aureus (07-19-20 @ 09:47)      -  Cefazolin: R 16      -  Clindamycin: S <=0.25      -  Daptomycin: S 0.5      -  Erythromycin: R >4      -  Linezolid: S 4      -  Oxacillin: R >2      -  RIF- Rifampin: S <=1 Should not be used as monotherapy      -  Tetra/Doxy: S <=1      -  Trimethoprim/Sulfamethoxazole: R >2/38      Method Type: YOLIE  Organism: Methicillin resistant Staphylococcus aureus (07-19-20 @ 09:47)      -  Vancomycin: S 1.5      Method Type: ETEST    Culture - Blood (collected 07-16-20 @ 22:00)  Source: .Blood Blood-Peripheral  Gram Stain (07-17-20 @ 19:11):    Aerobic Bottle: Gram Positive Cocci in Clusters    Result called to and read back byCuauhtemoc Cortes RN ([facility])  07/17/2020    18:32:43    Anaerobic Bottle: Gram Positive Cocci in Clusters    Floor previously notified.    ***Blood Panel PCR results on this specimen are available    approximately 3 hours after the Gram stain result.***    Gram stain, PCR, and/or culture results may not always    correspond due to difference in methodologies.    ************************************************************    This PCR assay was performed using [employer].    The following targets are tested for: Enterococcus,    vancomycin resistant enterococci, Listeria monocytogenes,    coagulase negative staphylococci, S. aureus,    methicillin resistant S. aureus, Streptococcus agalactiae    (Group B), S. pneumoniae, S. pyogenes (Group A),    Acinetobacter baumannii, Enterobacter cloacae, E. coli,    Klebsiella oxytoca, K. pneumoniae, Proteus sp.,    Serratia marcescens, Haemophilus influenzae,    Neisseria meningitidis, Pseudomonas aeruginosa, Candida    albicans, C. glabrata, C krusei, C parapsilosis,    C. tropicalis and the KPC resistance gene.  Final Report (07-19-20 @ 09:03):    Growth in aerobic and anaerobic bottles: Methicillin resistant    Staphylococcus aureus    Floor previously notified.  Organism: Methicillin resistant Staphylococcus aureus  Methicillin resistant Staphylococcus aureus  Blood Culture PCR (07-19-20 @ 09:03)  Organism: Blood Culture PCR (07-19-20 @ 09:03)      -  Methicillin resistant Staphylococcus aureus (MRSA): Detec Cindi Jones RN      Method Type: PCR  Organism: Methicillin resistant Staphylococcus aureus (07-19-20 @ 09:03)      -  Vancomycin: S 1.5      Method Type: ETEST  Organism: Methicillin resistant Staphylococcus aureus (07-19-20 @ 09:03)      -  Cefazolin: R 16      -  Clindamycin: S <=0.25      -  Daptomycin: S 0.5      -  Erythromycin: R >4      -  Linezolid: S 2      -  Oxacillin: R >2      -  RIF- Rifampin: S <=1 Should not be used as monotherapy      -  Tetra/Doxy: S <=1      -  Trimethoprim/Sulfamethoxazole: R >2/38      -  Vancomycin: S 1      Method Type: YOLIE    Culture - Blood (collected 07-16-20 @ 22:00)  Source: .Blood Blood-Peripheral  Gram Stain (07-17-20 @ 19:45):    Aerobic Bottle: Gram Positive Cocci in Clusters    Result called to and read back byCuauhtemoc Redmond RN ([id number])  07/17/2020    19:01:18    Anaerobic Bottle: Gram Positive Cocci in Clusters    Floor previously notified.  Final Report (07-19-20 @ 08:54):    Growth in aerobic and anaerobic bottles: Methicillin resistant    Staphylococcus aureus  Organism: Methicillin resistant Staphylococcus aureus  Methicillin resistant Staphylococcus aureus (07-19-20 @ 08:55)  Organism: Methicillin resistant Staphylococcus aureus (07-19-20 @ 08:55)      -  Vancomycin: S 1.5      Method Type: ETEST  Organism: Methicillin resistant Staphylococcus aureus (07-19-20 @ 08:55)      -  Cefazolin: R 16      -  Clindamycin: S <=0.25      -  Daptomycin: S 0.5      -  Erythromycin: R >4      -  Linezolid: S 2      -  Oxacillin: R >2      -  RIF- Rifampin: S <=1 Should not be used as monotherapy      -  Tetra/Doxy: S <=1      -  Trimethoprim/Sulfamethoxazole: R >2/38      -  Vancomycin: S 1      Method Type: YOLIE    Culture - Urine (collected 07-16-20 @ 21:12)  Source: .Urine Clean Catch (Midstream)  Final Report (07-17-20 @ 15:13):    No growth      TELEMETRY: A sense V pace 5 beat VT	      QTC     ECG:  	   QTC         RADIOLOGY:   DIAGNOSTIC TESTING: [x ] Echocardiogram: [ ]  Catheterization: [ ] Stress Test:      ASSESSMENT/PLAN: 	  MRSA sepsis- patient is at risk for infected leads. Recommend JOHN PAUL.    Severely reduced ejection fraction-the patient takes diuretics occasionally for weight gain. He is sedentary however denies dyspnea. His pulmonary pressures are high on echo. Please check BNP and chest x-ray. Rx metoprolol 25mg daily and digoxin. Check digoxin level.     Coronary artery disease-the patient denies chest discomfort. His EKG is uninterpretable secondary to the pacemaker. He was revascularized less than two years ago. There is no clinical evidence for ischemia. Rx aspirin and atorvastatin.  Rx prasugrel if possible.     Defibrillator-short runs of ventricular tachycardia. No treatment necessary.      Discussed with PA.

## 2020-07-20 NOTE — CHART NOTE - NSCHARTNOTEFT_GEN_A_CORE
Admitting Diagnosis:   Patient is a 65y old  Male who presents with a chief complaint of septic knee (20 Jul 2020 14:10)    Consult: Yes [   ]  No [ x  ]    Reason for Initial Nutrition Assessment: LOS Nutrition Assessment    PAST MEDICAL & SURGICAL HISTORY:  Diabetic neuropathy  STEMI (ST elevation myocardial infarction)  Diverticulitis  MRSA bacteremia  History of celiac disease  CHF (congestive heart failure): EF ~ 25%  HTN (hypertension)  Diabetes: on insulin pump  Blood clot due to device, implant, or graft: was on blood thinners  HLD (hyperlipidemia)  Osteoarthritis  Atherosclerosis of coronary artery: CAD (coronary artery disease)  Status post percutaneous transluminal coronary angioplasty: in 2012  History of open reduction and internal fixation (ORIF) procedure  Surgery, elective: Right shoulder  Surgery, elective: right knee wound debridement  S/P TKR (total knee replacement), right: with infection Mrsa   per pt he was cleared from MRSA infection  S/P CABG x 1: 2018  Stented coronary artery: 10/18 heart attack  INFERIOR WALL MI  Other postprocedural status: Fixation hardware in foot LEFT    Current Nutrition Order: CST CHO diet with evening snack    PO Intake: Good (%) [ x  ]  Fair (50-75%) [   ] Poor (<25%) [   ]    GI Issues:   WDL  No n/v/d/c noted  No abd distention noted at time of assessment.     Pain:  No pain noted at this time    Skin Integrity:  Surgical incision, arelis score 20  DM ulcers: Left shin, Left second toe, Right foot  No edema present  No pressure ulcers noted    Labs:   07-20    132<L>  |  96  |  25<H>  ----------------------------<  243<H>  3.9   |  24  |  0.75    Ca    8.8      20 Jul 2020 06:51    TPro  6.2  /  Alb  2.7<L>  /  TBili  0.8  /  DBili  x   /  AST  25  /  ALT  39  /  AlkPhos  111  07-20    CAPILLARY BLOOD GLUCOSE    POCT Blood Glucose.: 280 mg/dL (20 Jul 2020 11:35)  POCT Blood Glucose.: 234 mg/dL (20 Jul 2020 07:39)  POCT Blood Glucose.: 338 mg/dL (20 Jul 2020 02:05)  POCT Blood Glucose.: 403 mg/dL (20 Jul 2020 00:23)  POCT Blood Glucose.: 449 mg/dL (19 Jul 2020 21:32)  POCT Blood Glucose.: 320 mg/dL (19 Jul 2020 17:14)    Nutritionally Pertinent Lab Values:    Medications:  MEDICATIONS  (STANDING):  aspirin enteric coated 81 milliGRAM(s) Oral two times a day  atorvastatin 40 milliGRAM(s) Oral at bedtime  BACItracin   Ointment 1 Application(s) Topical daily  collagenase Ointment 1 Application(s) Topical daily  dextrose 5%. 1000 milliLiter(s) (50 mL/Hr) IV Continuous <Continuous>  dextrose 50% Injectable 12.5 Gram(s) IV Push once  dextrose 50% Injectable 25 Gram(s) IV Push once  dextrose 50% Injectable 25 Gram(s) IV Push once  digoxin     Tablet 0.125 milliGRAM(s) Oral daily  DULoxetine 90 milliGRAM(s) Oral daily  insulin glargine Injectable (LANTUS) 12 Unit(s) SubCutaneous at bedtime  insulin lispro (HumaLOG) corrective regimen sliding scale   SubCutaneous Before meals and at bedtime  insulin lispro Injectable (HumaLOG) 4 Unit(s) SubCutaneous three times a day before meals  lactated ringers. 1000 milliLiter(s) (50 mL/Hr) IV Continuous <Continuous>  metoprolol succinate ER 25 milliGRAM(s) Oral daily  nystatin Powder 1 Application(s) Topical two times a day  pantoprazole    Tablet 40 milliGRAM(s) Oral before breakfast  polyethylene glycol 3350 17 Gram(s) Oral daily  prasugrel 10 milliGRAM(s) Oral daily  tamsulosin Oral Tab/Cap - Peds 0.4 milliGRAM(s) Oral at bedtime  vancomycin  IVPB 1250 milliGRAM(s) IV Intermittent every 12 hours    MEDICATIONS  (PRN):  acetaminophen   Tablet .. 975 milliGRAM(s) Oral every 8 hours PRN Temp greater or equal to 38C (100.4F), Mild Pain (1 - 3)  aluminum hydroxide/magnesium hydroxide/simethicone Suspension 30 milliLiter(s) Oral four times a day PRN Indigestion  bisacodyl Suppository 10 milliGRAM(s) Rectal daily PRN If no bowel movement by postoperative day #2  dextrose 40% Gel 15 Gram(s) Oral once PRN Blood Glucose LESS THAN 70 milliGRAM(s)/deciliter  glucagon  Injectable 1 milliGRAM(s) IntraMuscular once PRN Glucose LESS THAN 70 milligrams/deciliter  HYDROmorphone  Injectable 0.5 milliGRAM(s) IV Push every 15 minutes PRN Breakthrough Pain  magnesium hydroxide Suspension 30 milliLiter(s) Oral daily PRN Constipation  ondansetron Injectable 4 milliGRAM(s) IV Push every 6 hours PRN Nausea and/or Vomiting  oxyCODONE    IR 5 milliGRAM(s) Oral every 4 hours PRN Moderate Pain (4 - 6)  oxyCODONE    IR 10 milliGRAM(s) Oral every 4 hours PRN Severe Pain (7 - 10)  senna 2 Tablet(s) Oral at bedtime PRN Constipation    Admitted Anthropometrics:  Height: 74" Weight: 183lbs, IBW 190lbs+/-10%, %IBW 96%, BMI 23.5 kg/m2    Weight:  7/16 183lbs    Weight Change: UBW consistent with admission weight. Please cont to trend weights biweekly.     Nutrition Focused Physical Exam: Completed [   ]  Unable to complete [   ]- Unremarkable.     Estimated energy needs:   ABW (83kg) used for calculations as pt between % of IBW. Needs adjusted for advanced age and wound healing s/p I and D.   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 Pt started on abx course for culture positive for MRSA with negative blood cultures (rifampin and vanco). On assessment, pt sitting in bed comfortably. Currently on CST CHO diet with evening snack. Pt consuming >75% of meals with no complaints. Denies n/v/d/c. Pt wishing addition of Glucerna shake during a meal as he takes this at home and would likely to resume here- disc with team. Cont to encourage adequate PO intake. Pt with hx of DM (noted A1C ~year ago was in low 6s). Hyperglycemia noted (POCT 234H) likely 2/2 infection and s/p I&D. Currently on lantus and humalog- adjusted as needed per team. Education reviewing CST CHO provided- pt is well educated on diet with good support from wife to control BS. Noted good PO intake PTA. UBW consistent with admission weight. NKFA. Please see nutrition recs below. RD to follow up per protocol.     Nutrition Diagnosis: Increased kcal, pro needs RT increased nutrient demand 2/2 wound healing AEB s/p  right knee arthroscopic I&D    [  ] No active nutrition diagnosis at this time  [  ] Current medical condition precludes nutrition intervention    Goal: Consistently meet >75% est needs    Recommendations:  1. Cont with CST CHO diet with evening snack  2. Recommend addition of Glucerna Shake once daily (220kcal, 10 g pro)  3. Cont with wound care per team  4. Cont with adequate pain and bowel regime per team  5. RD diet education reenforcement prn  6. Monitor lytes and replete prn    Education: Education reviewing CST CHO provided- pt is well educated on diet with good support from wife to control BS.    Risk Level: High [   ] Moderate [ x  ] Low [   ]

## 2020-07-20 NOTE — PROGRESS NOTE ADULT - SUBJECTIVE AND OBJECTIVE BOX
INTERVAL HPI/OVERNIGHT EVENTS:  Feels better, R knee feels stiff.    CONSTITUTIONAL:  No fever, chills, night sweats  EYES:  No photophobia or visual changes  CARDIOVASCULAR:  No chest pain  RESPIRATORY:  No cough, wheezing, or SOB   GASTROINTESTINAL:  No nausea, vomiting, diarrhea, constipation, or abdominal pain  GENITOURINARY:  No frequency, urgency, dysuria or hematuria  NEUROLOGIC:  No headache, lightheadedness      ANTIBIOTICS/RELEVANT:    Vancomycin 1.25 g IV q12h (7/17-present)  Rifampin 600 mg IV q24h (7/18-present)      Vital Signs Last 24 Hrs  T(C): 36.6 (20 Jul 2020 09:00), Max: 37 (20 Jul 2020 06:04)  T(F): 97.9 (20 Jul 2020 09:00), Max: 98.6 (20 Jul 2020 06:04)  HR: 89 (20 Jul 2020 09:00) (75 - 90)  BP: 103/65 (20 Jul 2020 09:00) (100/56 - 124/71)  BP(mean): --  RR: 18 (20 Jul 2020 09:00) (16 - 18)  SpO2: 98% (20 Jul 2020 09:00) (95% - 98%)    PHYSICAL EXAM:  Constitutional:  Awake, alert, nontoxic appearing  Eyes:  Sclerae anicteric, conjunctivae clear, PERRL  Ear/Nose/Throat:  No nasal exudate or sinus tenderness;  No buccal mucosal lesions, no pharyngeal erythema or exudate	  Neck:  Supple, no adenopathy  Respiratory:  Clear bilaterally  Cardiovascular:  RRR, S1S2, no murmur appreciated  Gastrointestinal:  Symmetric, normoactive BS, soft, NT, no masses, guarding or rebound.  No HSM  Extremities:  No edema.  R knee with mild warmth, decreased flexion to ~165.  Drain with purulent drainage.      LABS:                        10.4   9.90  )-----------( 330      ( 20 Jul 2020 06:51 )             33.3         07-20    132<L>  |  96  |  25<H>  ----------------------------<  243<H>  3.9   |  24  |  0.75    Ca    8.8      20 Jul 2020 06:51    TPro  6.2  /  Alb  2.7<L>  /  TBili  0.8  /  DBili  x   /  AST  25  /  ALT  39  /  AlkPhos  111  07-20          MICROBIOLOGY:  Blood cultures:  7/16 X 2 - MRSA (4/4 bottles)  vancomycin YOLIE 1.5;  7/19 X 2 - NGTD    R knee aspirate 7/17 X 2 - few WBCs, no orgs seen;  NGTD;  AFB cultures X 2 - smear neg    OR cultures 7/17:  Tissue R synovium 2:  no orgs, rare WBCs        RADIOLOGY & ADDITIONAL STUDIES: INTERVAL HPI/OVERNIGHT EVENTS:  Feels better, R knee feels stiff.    CONSTITUTIONAL:  No fever, chills, night sweats  EYES:  No photophobia or visual changes  CARDIOVASCULAR:  No chest pain  RESPIRATORY:  No cough, wheezing, or SOB   GASTROINTESTINAL:  No nausea, vomiting, diarrhea, constipation, or abdominal pain  GENITOURINARY:  No frequency, urgency, dysuria or hematuria  NEUROLOGIC:  No headache, lightheadedness      ANTIBIOTICS/RELEVANT:    Vancomycin 1.25 g IV q12h (7/17-present)  Rifampin 600 mg IV q24h (7/18-present)      Vital Signs Last 24 Hrs  T(C): 36.6 (20 Jul 2020 09:00), Max: 37 (20 Jul 2020 06:04)  T(F): 97.9 (20 Jul 2020 09:00), Max: 98.6 (20 Jul 2020 06:04)  HR: 89 (20 Jul 2020 09:00) (75 - 90)  BP: 103/65 (20 Jul 2020 09:00) (100/56 - 124/71)  BP(mean): --  RR: 18 (20 Jul 2020 09:00) (16 - 18)  SpO2: 98% (20 Jul 2020 09:00) (95% - 98%)    PHYSICAL EXAM:  Constitutional:  Awake, alert, nontoxic appearing  Eyes:  Sclerae anicteric, conjunctivae clear, PERRL  Ear/Nose/Throat:  No nasal exudate or sinus tenderness;  No buccal mucosal lesions, no pharyngeal erythema or exudate	  Neck:  Supple, no adenopathy  Respiratory:  Clear bilaterally  Cardiovascular:  RRR, S1S2, no murmur appreciated  Gastrointestinal:  Symmetric, normoactive BS, soft, NT, no masses, guarding or rebound.  No HSM  Extremities:  No edema.  R knee with mild warmth, decreased flexion to ~165.  Drain with purulent drainage.      LABS:                        10.4   9.90  )-----------( 330      ( 20 Jul 2020 06:51 )             33.3         07-20    132<L>  |  96  |  25<H>  ----------------------------<  243<H>  3.9   |  24  |  0.75    Ca    8.8      20 Jul 2020 06:51    TPro  6.2  /  Alb  2.7<L>  /  TBili  0.8  /  DBili  x   /  AST  25  /  ALT  39  /  AlkPhos  111  07-20    Vancomycin trough 17.7 (7/20 09:22 - prior dose 22:30)      MICROBIOLOGY:  Blood cultures:  7/16 X 2 - MRSA (4/4 bottles)  vancomycin YOLIE 1.5;  7/19 X 2 - NGTD    R knee aspirate 7/17 X 2 - few WBCs, no orgs seen;  NGTD;  AFB cultures X 2 - smear neg    OR cultures 7/17:  Tissue R synovium 2:  no orgs, rare WBCs        RADIOLOGY & ADDITIONAL STUDIES:  < from: TTE Echo Complete w/ Contrast w/ Doppler (07.17.20 @ 10:00) >  FINDINGS:     Left Ventricle:  The left ventricle cavity size is normal. There is akinesis of the basal and mid inferolateral wall, basal and mid inferior wall, mid and apicalanterior wall, and apex. The remaining walls are hypokinetic. Abnormal septal motion seen due to previous cardiac surgery and right ventricular pacing. Left ventricular systolic function is severely reduced with a calculated ejection fraction of 15% with regional wall motion abnormalities. Analysis of mitral annular tissue Doppler, left atrial volume index, and tricuspid regurgitant velocity reveals: grade III diastolic dysfunction with elevated filling pressure.     Right Ventricle:  The right ventricle is dilated. Right ventricular systolic function is reduced. The tricuspid annular plane systolic excursion (TAPSE) is 14.00 mm (normal >=17 mm). RV tissue Doppler S' is 10.00 cm/s (normal >10 cm/s). Device leads are noted in the right heart.     Left Atrium:  The left atrium is normal in size. Left atrial volume index (VANE) is 31.3 ml/m².     Right Atrium:  The right atrium is normal in size.     Aortic Valve:  Fibrocalcific tricuspid aortic valve without significant stenosis. There is no evidence of aortic regurgitation.     Mitral Valve:  Structurally normal mitral valve with normal leaflet excursion. There is mild mitral annular calcification. There is mild mitral regurgitation.     Tricuspid Valve:  Structurally normal tricuspid valve with normal leaflet excursion. There is trace tricuspid regurgitation. Pulmonary artery systolic pressure (estimated using the tricuspid regurgitant gradient and an estimate of right atrial pressure) is 68 mmHg.     Inferior Vena Cava:  The inferior vena cava is normal in size (<2.1cm) with normal inspiratory collapse (>50%) consistent with normal right atrial pressure (  3, range 0-5mmHg).     Pulmonic Valve:  Structurally normal pulmonic valve with normal leaflet excursion. There is trace pulmonic regurgitation.     Aorta:  The aortic root is normal in size. The aortic arch is normal without evidence of aortic coarctation.     Pericardium:  No pericardial effusion is seen.    < end of copied text >

## 2020-07-20 NOTE — PROGRESS NOTE ADULT - SUBJECTIVE AND OBJECTIVE BOX
Orthopaedic Surgery Progress Note    Post-operative day #3 s/p R knee arthroscopic I&D    Subjective:     Patient seen and examined. Patient comfortable without complaints, pain controlled.  Denies chest pain, shortness of breath, nausea/vomiting, numbness/tingling.    Objective:    Vital Signs Last 24 Hrs  T(C): 36.6 (07-20-20 @ 09:00), Max: 37 (07-20-20 @ 06:04)  T(F): 97.9 (07-20-20 @ 09:00), Max: 98.6 (07-20-20 @ 06:04)  HR: 89 (07-20-20 @ 09:00) (89 - 90)  BP: 103/65 (07-20-20 @ 09:00) (103/65 - 116/67)  RR: 18 (07-20-20 @ 09:00) (16 - 18)  SpO2: 98% (07-20-20 @ 09:00) (96% - 98%)  AVSS    PE:  General: Patient alert and oriented, NAD  Dressing: Clean/dry/intact R knee with HV x 1 in place with serosanginous drainage in tube, small amount of bloody drainage in HV  Pulses: 2+ DP BLE   Sensation: SILT BLE, slightly decreased consistent with pre-op  Motor: EHL/FHL/TA/GS 5/5 BLE                          10.4   9.90  )-----------( 330      ( 20 Jul 2020 06:51 )             33.3   20 Jul 2020 06:51    132    |  96     |  25     ----------------------------<  243    3.9     |  24     |  0.75     Ca    8.8        20 Jul 2020 06:51    TPro  6.2    /  Alb  2.7    /  TBili  0.8    /  DBili  x      /  AST  25     /  ALT  39     /  AlkPhos  111    20 Jul 2020 06:51    HV x 1  70 in 24 hours, 30 overnight    A/P: 65yMale POD#3 s/p R knee arthroscopic I&D   1. Pain control as needed  2. DVT prophylaxis: ASA, Effient  3. PT, weight-bearing status: WBAT   4. Antibiotics: vanc and rifampin, PICC pending neg BC x 48 hours, vanc trough 17.7 this AM, appreciate ID recs.  5. Dispo: Home with home PT    Ortho Pager [phone]

## 2020-07-20 NOTE — PROGRESS NOTE ADULT - SUBJECTIVE AND OBJECTIVE BOX
Interval Events: Reviewed  Patient seen and examined at bedside.    Patient is a 65y old  Male who presents with a chief complaint of sepsis (19 Jul 2020 09:14)    he is doing better  PAST MEDICAL & SURGICAL HISTORY:  Diabetic neuropathy  STEMI (ST elevation myocardial infarction)  Diverticulitis  MRSA bacteremia  History of celiac disease  CHF (congestive heart failure): EF ~ 25%  HTN (hypertension)  Diabetes: on insulin pump  Blood clot due to device, implant, or graft: was on blood thinners  HLD (hyperlipidemia)  Osteoarthritis  Atherosclerosis of coronary artery: CAD (coronary artery disease)  Status post percutaneous transluminal coronary angioplasty: in 2012  History of open reduction and internal fixation (ORIF) procedure  Surgery, elective: Right shoulder  Surgery, elective: right knee wound debridement  S/P TKR (total knee replacement), right: with infection Mrsa   per pt he was cleared from MRSA infection  S/P CABG x 1: 2018  Stented coronary artery: 10/18 heart attack  INFERIOR WALL MI  Other postprocedural status: Fixation hardware in foot LEFT      MEDICATIONS:  Pulmonary:    Antimicrobials:  rifAMPin IVPB 600 milliGRAM(s) IV Intermittent every 24 hours  vancomycin  IVPB 1250 milliGRAM(s) IV Intermittent every 12 hours    Anticoagulants:  aspirin enteric coated 81 milliGRAM(s) Oral two times a day  prasugrel 10 milliGRAM(s) Oral daily    Cardiac:  digoxin     Tablet 0.125 milliGRAM(s) Oral daily  metoprolol succinate ER 25 milliGRAM(s) Oral daily  tamsulosin Oral Tab/Cap - Peds 0.4 milliGRAM(s) Oral at bedtime      Allergies    ACE inhibitors (Hives)  carvedilol (Other)  enalapril (Hives)  Entresto (Other)    Intolerances        Vital Signs Last 24 Hrs  T(C): 36.6 (20 Jul 2020 09:00), Max: 37 (20 Jul 2020 06:04)  T(F): 97.9 (20 Jul 2020 09:00), Max: 98.6 (20 Jul 2020 06:04)  HR: 89 (20 Jul 2020 09:00) (75 - 90)  BP: 103/65 (20 Jul 2020 09:00) (100/56 - 124/71)  BP(mean): --  RR: 18 (20 Jul 2020 09:00) (16 - 18)  SpO2: 98% (20 Jul 2020 09:00) (95% - 98%)    07-19 @ 07:01  -  07-20 @ 07:00  --------------------------------------------------------  IN: 350 mL / OUT: 3120 mL / NET: -2770 mL    07-20 @ 07:01 - 07-20 @ 12:31  --------------------------------------------------------  IN: 400 mL / OUT: 350 mL / NET: 50 mL          Review of Systems:   •	General: negative  •	Skin/Breast: negative  •	Ophthalmologic: negative  •	ENMT: negative  •	Respiratory and Thorax: negative  •	Cardiovascular: negative  •	Gastrointestinal: negative  •	Genitourinary: negative  •	Musculoskeletal: negative  •	Neurological: negative  •	Psychiatric: negative  •	Hematology/Lymphatics: negative  •	Endocrine: negative  •	Allergic/Immunologic: negative    Physical Exam:   • Constitutional:	Well-developed, well nourished  • Eyes:	EOMI; PERRL; no drainage or redness  • ENMT:	No oral lesions; no gross abnormalities  • Neck	No bruits; no thyromegaly or nodules  • Breasts:	not examined  • Back:	No deformity or limitation of movement  • Respiratory:	Breath Sounds equal & clear to percussion & auscultation, no accessory muscle use  • Cardiovascular:	Regular rate & rhythm, normal S1, S2; no murmurs, gallops or rubs; no S3, S4  • Gastrointestinal:	Soft, non-tender, no hepatosplenomegaly, normal bowel sounds  • Genitourinary:	not examined  • Rectal: not examined  • Extremities:	No cyanosis, clubbing or edema  • Vascular:	Equal and normal pulses (carotid, femoral, dorsalis pedis)  • Neurologica:l	not examined  • Skin:	No lesions; no rash  • Lymph Nodes:	No lymphadedenopathy  • Musculoskeletal:	No joint pain, swelling or deformity; no limitation of movement        LABS:      CBC Full  -  ( 20 Jul 2020 06:51 )  WBC Count : 9.90 K/uL  RBC Count : 4.33 M/uL  Hemoglobin : 10.4 g/dL  Hematocrit : 33.3 %  Platelet Count - Automated : 330 K/uL  Mean Cell Volume : 76.9 fl  Mean Cell Hemoglobin : 24.0 pg  Mean Cell Hemoglobin Concentration : 31.2 gm/dL  Auto Neutrophil # : x  Auto Lymphocyte # : x  Auto Monocyte # : x  Auto Eosinophil # : x  Auto Basophil # : x  Auto Neutrophil % : x  Auto Lymphocyte % : x  Auto Monocyte % : x  Auto Eosinophil % : x  Auto Basophil % : x    07-20    132<L>  |  96  |  25<H>  ----------------------------<  243<H>  3.9   |  24  |  0.75    Ca    8.8      20 Jul 2020 06:51    TPro  6.2  /  Alb  2.7<L>  /  TBili  0.8  /  DBili  x   /  AST  25  /  ALT  39  /  AlkPhos  111  07-20                    Culture Results:   No growth at 12 hours (07-19 @ 15:11)  Culture Results:   No growth at 12 hours (07-19 @ 15:11)      RADIOLOGY & ADDITIONAL STUDIES (The following images were personally reviewed):  Loja:                                     No  Urine output:                       adequate  DVT prophylaxis:                 Yes  Flattus:                                  Yes  Bowel movement:              No

## 2020-07-20 NOTE — PROGRESS NOTE ADULT - SUBJECTIVE AND OBJECTIVE BOX
Pt comfortable without complaints, pain controlled  Denies CP, SOB, N/V, numbness/tingling     Vital Signs Last 24 Hrs  T(C): 37 (20 Jul 2020 06:04), Max: 37 (20 Jul 2020 06:04)  T(F): 98.6 (20 Jul 2020 06:04), Max: 98.6 (20 Jul 2020 06:04)  HR: 90 (20 Jul 2020 06:04) (75 - 90)  BP: 116/67 (20 Jul 2020 06:04) (97/52 - 124/71)  BP(mean): --  RR: 16 (20 Jul 2020 06:04) (16 - 18)  SpO2: 96% (20 Jul 2020 06:04) (95% - 98%)    General: Pt Alert and oriented, NAD  R knee DSG removed, slightly warm and erythematous to the touch  Hemovac drain in place and suctioning appropriately  Pulses: 2+  Sensation: SILT bilaterally distally, slightly diminished 2/2 diabetic neuropathy  Motor: EHL/FHL/TA/GS 5/5 RLE                          11.6   14.12 )-----------( 301      ( 17 Jul 2020 07:09 )             37.8   17 Jul 2020 07:09    132    |  93     |  20     ----------------------------<  211    4.6     |  21     |  0.74     Phos  3.6       17 Jul 2020 07:09  Mg     2.2       17 Jul 2020 07:09    TPro  7.2    /  Alb  3.4    /  TBili  0.9    /  DBili  x      /  AST  35     /  ALT  34     /  AlkPhos  130    16 Jul 2020 19:46    A/P: 65yMale POD#3 s/p R Arthroscopic I&D for periprosthetic infection  - Stable  - Pain Control  - Multi podus boots for ulcer ppx  - DVT ppx: ASA  - Drain O/P 30 mL overnight with some purulence  - Post op abx: Vanco/Zosyn  - Glycemic control: pt sugars are too high, started on basal/bolus regimen 7/19 of 12 lantus/4 lispro, would appreciate medicine recs  - F/u ID Recs: f/u Surveillance Blood for PICC line later this week  - F/u Vascular Recs: managing LLE  - PT, WBS: WBAT    Kaisen Harjit, MD  Orthopedic Surgery, PGY-1   Ortho Pager [phone]

## 2020-07-21 ENCOUNTER — TRANSCRIPTION ENCOUNTER (OUTPATIENT)
Age: 65
End: 2020-07-21

## 2020-07-21 LAB
ALBUMIN SERPL ELPH-MCNC: 2.7 G/DL — LOW (ref 3.3–5)
ALP SERPL-CCNC: 104 U/L — SIGNIFICANT CHANGE UP (ref 40–120)
ALT FLD-CCNC: 28 U/L — SIGNIFICANT CHANGE UP (ref 10–45)
ANION GAP SERPL CALC-SCNC: 11 MMOL/L — SIGNIFICANT CHANGE UP (ref 5–17)
AST SERPL-CCNC: 16 U/L — SIGNIFICANT CHANGE UP (ref 10–40)
BILIRUB SERPL-MCNC: 0.7 MG/DL — SIGNIFICANT CHANGE UP (ref 0.2–1.2)
BUN SERPL-MCNC: 16 MG/DL — SIGNIFICANT CHANGE UP (ref 7–23)
CALCIUM SERPL-MCNC: 8.7 MG/DL — SIGNIFICANT CHANGE UP (ref 8.4–10.5)
CHLORIDE SERPL-SCNC: 100 MMOL/L — SIGNIFICANT CHANGE UP (ref 96–108)
CO2 SERPL-SCNC: 25 MMOL/L — SIGNIFICANT CHANGE UP (ref 22–31)
CREAT SERPL-MCNC: 0.6 MG/DL — SIGNIFICANT CHANGE UP (ref 0.5–1.3)
CRP SERPL-MCNC: 8.47 MG/DL — HIGH (ref 0–0.4)
ERYTHROCYTE [SEDIMENTATION RATE] IN BLOOD: 39 MM/HR — HIGH
GLUCOSE BLDC GLUCOMTR-MCNC: 135 MG/DL — HIGH (ref 70–99)
GLUCOSE BLDC GLUCOMTR-MCNC: 184 MG/DL — HIGH (ref 70–99)
GLUCOSE BLDC GLUCOMTR-MCNC: 203 MG/DL — HIGH (ref 70–99)
GLUCOSE BLDC GLUCOMTR-MCNC: 222 MG/DL — HIGH (ref 70–99)
GLUCOSE BLDC GLUCOMTR-MCNC: 266 MG/DL — HIGH (ref 70–99)
GLUCOSE BLDC GLUCOMTR-MCNC: 271 MG/DL — HIGH (ref 70–99)
GLUCOSE SERPL-MCNC: 208 MG/DL — HIGH (ref 70–99)
HCT VFR BLD CALC: 35.6 % — LOW (ref 39–50)
HGB BLD-MCNC: 10.8 G/DL — LOW (ref 13–17)
MCHC RBC-ENTMCNC: 23.6 PG — LOW (ref 27–34)
MCHC RBC-ENTMCNC: 30.3 GM/DL — LOW (ref 32–36)
MCV RBC AUTO: 77.7 FL — LOW (ref 80–100)
NRBC # BLD: 0 /100 WBCS — SIGNIFICANT CHANGE UP (ref 0–0)
PLATELET # BLD AUTO: 332 K/UL — SIGNIFICANT CHANGE UP (ref 150–400)
POTASSIUM SERPL-MCNC: 4 MMOL/L — SIGNIFICANT CHANGE UP (ref 3.5–5.3)
POTASSIUM SERPL-SCNC: 4 MMOL/L — SIGNIFICANT CHANGE UP (ref 3.5–5.3)
PROT SERPL-MCNC: 6.1 G/DL — SIGNIFICANT CHANGE UP (ref 6–8.3)
RBC # BLD: 4.58 M/UL — SIGNIFICANT CHANGE UP (ref 4.2–5.8)
RBC # FLD: 17.8 % — HIGH (ref 10.3–14.5)
SODIUM SERPL-SCNC: 136 MMOL/L — SIGNIFICANT CHANGE UP (ref 135–145)
WBC # BLD: 11.82 K/UL — HIGH (ref 3.8–10.5)
WBC # FLD AUTO: 11.82 K/UL — HIGH (ref 3.8–10.5)

## 2020-07-21 PROCEDURE — 99232 SBSQ HOSP IP/OBS MODERATE 35: CPT | Mod: GC

## 2020-07-21 PROCEDURE — 99231 SBSQ HOSP IP/OBS SF/LOW 25: CPT | Mod: GC,25

## 2020-07-21 PROCEDURE — 76376 3D RENDER W/INTRP POSTPROCES: CPT | Mod: 26

## 2020-07-21 PROCEDURE — 99232 SBSQ HOSP IP/OBS MODERATE 35: CPT

## 2020-07-21 PROCEDURE — 99233 SBSQ HOSP IP/OBS HIGH 50: CPT

## 2020-07-21 PROCEDURE — 93312 ECHO TRANSESOPHAGEAL: CPT | Mod: 26

## 2020-07-21 RX ADMIN — DULOXETINE HYDROCHLORIDE 90 MILLIGRAM(S): 30 CAPSULE, DELAYED RELEASE ORAL at 18:18

## 2020-07-21 RX ADMIN — INSULIN GLARGINE 12 UNIT(S): 100 INJECTION, SOLUTION SUBCUTANEOUS at 22:08

## 2020-07-21 RX ADMIN — PRASUGREL 10 MILLIGRAM(S): 5 TABLET, FILM COATED ORAL at 18:18

## 2020-07-21 RX ADMIN — Medication 81 MILLIGRAM(S): at 05:53

## 2020-07-21 RX ADMIN — OXYCODONE HYDROCHLORIDE 10 MILLIGRAM(S): 5 TABLET ORAL at 19:40

## 2020-07-21 RX ADMIN — OXYCODONE HYDROCHLORIDE 10 MILLIGRAM(S): 5 TABLET ORAL at 19:07

## 2020-07-21 RX ADMIN — Medication 4 UNIT(S): at 18:16

## 2020-07-21 RX ADMIN — Medication 25 MILLIGRAM(S): at 05:53

## 2020-07-21 RX ADMIN — NYSTATIN CREAM 1 APPLICATION(S): 100000 CREAM TOPICAL at 05:54

## 2020-07-21 RX ADMIN — Medication 6: at 22:06

## 2020-07-21 RX ADMIN — Medication 4: at 07:50

## 2020-07-21 RX ADMIN — POLYETHYLENE GLYCOL 3350 17 GRAM(S): 17 POWDER, FOR SOLUTION ORAL at 18:23

## 2020-07-21 RX ADMIN — Medication 0.12 MILLIGRAM(S): at 05:53

## 2020-07-21 RX ADMIN — Medication 250 MILLIGRAM(S): at 12:56

## 2020-07-21 RX ADMIN — PANTOPRAZOLE SODIUM 40 MILLIGRAM(S): 20 TABLET, DELAYED RELEASE ORAL at 06:05

## 2020-07-21 RX ADMIN — NYSTATIN CREAM 1 APPLICATION(S): 100000 CREAM TOPICAL at 18:24

## 2020-07-21 RX ADMIN — ATORVASTATIN CALCIUM 40 MILLIGRAM(S): 80 TABLET, FILM COATED ORAL at 21:27

## 2020-07-21 RX ADMIN — Medication 81 MILLIGRAM(S): at 18:23

## 2020-07-21 NOTE — PROGRESS NOTE ADULT - SUBJECTIVE AND OBJECTIVE BOX
Pt comfortable without complaints, pain controlled  Denies CP, SOB, N/V, numbness/tingling     Vital Signs Last 24 Hrs  T(C): 37.6 (21 Jul 2020 05:28), Max: 37.6 (21 Jul 2020 05:28)  T(F): 99.6 (21 Jul 2020 05:28), Max: 99.6 (21 Jul 2020 05:28)  HR: 93 (21 Jul 2020 05:28) (81 - 93)  BP: 124/73 (21 Jul 2020 05:28) (91/54 - 124/73)  BP(mean): --  RR: 17 (21 Jul 2020 05:28) (16 - 18)  SpO2: 95% (21 Jul 2020 05:28) (94% - 98%)    General: Pt Alert and oriented, NAD  R knee slightly warm and erythematous to the touch, no dressing  Hemovac drain in place and suctioning appropriately, outputting purulent fluid  Pulses: 2+  Sensation: SILT bilaterally distally, slightly diminished 2/2 diabetic neuropathy  Motor: EHL/FHL/TA/GS 5/5 RLE                          11.6   14.12 )-----------( 301      ( 17 Jul 2020 07:09 )             37.8   17 Jul 2020 07:09    132    |  93     |  20     ----------------------------<  211    4.6     |  21     |  0.74     Phos  3.6       17 Jul 2020 07:09  Mg     2.2       17 Jul 2020 07:09    TPro  7.2    /  Alb  3.4    /  TBili  0.9    /  DBili  x      /  AST  35     /  ALT  34     /  AlkPhos  130    16 Jul 2020 19:46    A/P: 65yMale POD#3 s/p R Arthroscopic I&D for periprosthetic infection  - Stable  - Pain Control  - Multi podus boots for ulcer ppx  - DVT ppx: ASA  - Drain O/P 30 mL overnight mostly purulence  - Post op abx: Vanco/Zosyn, continue rifampin as per ID  - Glycemic control: pt sugars are too high, started on basal/bolus regimen 7/19 of 12 lantus/4 lispro, adjustment by Dr. Kwok  - F/u ID Recs: f/u Surveillance Blood for PICC line later this week, if neg ok for PICC on 7/22  - F/u Vascular Recs: managing LLE  - F/u cardiology recs: plan for JOHN PAUL to r/o infected leads of pacemaker  - PT, WBS: LAURIE Lombardi MD  Orthopedic Surgery, PGY-1   Ortho Pager [phone]

## 2020-07-21 NOTE — PROGRESS NOTE ADULT - SUBJECTIVE AND OBJECTIVE BOX
INTERVAL HPI/OVERNIGHT EVENTS:  Knee feels stiff more than painful    CONSTITUTIONAL:  No fever, chills, night sweats  EYES:  No photophobia or visual changes  CARDIOVASCULAR:  No chest pain  RESPIRATORY:  No cough, wheezing, or SOB   GASTROINTESTINAL:  No nausea, vomiting, diarrhea, constipation, or abdominal pain  GENITOURINARY:  No frequency, urgency, dysuria or hematuria  NEUROLOGIC:  No headache, lightheadedness      ANTIBIOTICS/RELEVANT:    Vancomycin 1.25 g IV q12h (7/17-present)  Rifampin 600 mg IV q24h (7/18-present)    Pip-tazo 7/16    Vital Signs Last 24 Hrs  T(C): 36.6 (21 Jul 2020 16:01), Max: 37.6 (21 Jul 2020 05:28)  T(F): 97.8 (21 Jul 2020 16:01), Max: 99.6 (21 Jul 2020 05:28)  HR: 80 (21 Jul 2020 16:01) (80 - 93)  BP: 116/66 (21 Jul 2020 16:01) (105/67 - 124/73)  BP(mean): --  RR: 18 (21 Jul 2020 16:01) (17 - 18)  SpO2: 99% (21 Jul 2020 16:01) (95% - 99%)    PHYSICAL EXAM:  Constitutional:  Awake, alert, nontoxic appearing  Eyes:  Sclerae anicteric, conjunctivae clear, PERRL  Ear/Nose/Throat:  No nasal exudate or sinus tenderness;  No buccal mucosal lesions, no pharyngeal erythema or exudate	  Neck:  Supple, no adenopathy  L ant chest ICD pocket without erythema/warmth   Respiratory:  Clear bilaterally  Cardiovascular:  RRR, S1S2, no murmur appreciated  Gastrointestinal:  Symmetric, normoactive BS, soft, NT, no masses, guarding or rebound.  No HSM  Extremities:  No edema.  R knee with minimal warmth, decreased flexion to ~165.  Drain with purulent drainage.        LABS:                        10.8   11.82 )-----------( 332      ( 21 Jul 2020 07:41 )             35.6         07-21    136  |  100  |  16  ----------------------------<  208<H>  4.0   |  25  |  0.60    Ca    8.7      21 Jul 2020 07:41    TPro  6.1  /  Alb  2.7<L>  /  TBili  0.7  /  DBili  x   /  AST  16  /  ALT  28  /  AlkPhos  104  07-21        MICROBIOLOGY:  Blood cultures:  7/16 X 2 - MRSA (4/4 bottles)  vancomycin YOLIE 1.5;  7/19 X 2 - NGTD    R knee aspirate 7/17 X 2 - few WBCs, no orgs seen;  NGTD;  AFB cultures X 2 - smear neg    OR cultures 7/17:  Tissue R synovium 2:  no orgs, rare WBCs        RADIOLOGY & ADDITIONAL STUDIES:    < from: JOHN PAUL w/Doppler (07.21.20 @ 08:58) >  CONCLUSIONS:     1. Severely reduced left ventricular systolic function.   2. Mildly reduced right ventricular systolic function. No vegetations seen on the device leads.   3. The aortic valve is tricuspid with normal structure and function without stenosis. There is trace aortic regurgitation. There is a small linear fibrinous strand on the ventricular aspect of the aortic valve along the coaptation line measuring    3 mm, likely Lambl's excrescence. Clinical correlation is advised. Normal mitral, tricuspid and pulmonic valves.   4. Mild mitral regurgitation.   5. No pericardial effusion.   6. No prior echo is available for comparison.    < end of copied text >

## 2020-07-21 NOTE — DISCHARGE NOTE PROVIDER - CARE PROVIDER_API CALL
Jose Castro)  Orthopedics  [address] [place] [address]  Phone: [phone]  Fax: [phone]  Follow Up Time: 2 weeks    Zahra Casillas  INFECTIOUS DISEASE  [address] [place] [address]  Phone: [phone]  Fax: [phone]  Follow Up Time: 2 weeks    Noel Malloy  SURGERY  [address] [place] [address]  Phone: [phone]  Fax: [phone]  Follow Up Time: 1 week    Lilia Caldera  CARDIOVASCULAR DISEASE  [address] [place] [address]  Phone: [phone]  Fax: [phone]  Follow Up Time: 2 weeks Jose Castro)  Orthopedics  [address] [place] [address]  Phone: [phone]  Fax: [phone]  Follow Up Time: 2 weeks    Zahra Casillas  INFECTIOUS DISEASE  [address] [place] [address]  Phone: [phone]  Fax: [phone]  Follow Up Time: 2 weeks    Noel Malloy  SURGERY  [address] [place] [address]  Phone: [phone]  Fax: [phone]  Follow Up Time: 1 week    Lilia Caldera  CARDIOVASCULAR DISEASE  [address] [place] [address]  Phone: [phone]  Fax: [phone]  Follow Up Time: 2 weeks    Ashley Bowen  PLASTIC SURGERY  [address] [place] [address]  Phone: [phone]  Fax: [phone]  Follow Up Time: 1 week Jose Castro)  Orthopedics  [address] [place] [address]  Phone: [phone]  Fax: [phone]  Follow Up Time: 1 week    Zahra Casillas  INFECTIOUS DISEASE  [address] [place] [address]  Phone: [phone]  Fax: [phone]  Follow Up Time: 2 weeks    Noel Malloy  SURGERY  [address] [place] [address]  Phone: [phone]  Fax: [phone]  Follow Up Time: 1 week    Lilia Caldera  CARDIOVASCULAR DISEASE  [address] [place] [address]  Phone: [phone]  Fax: [phone]  Follow Up Time: 2 weeks    Ashley Bowen  PLASTIC SURGERY  [address] [place] [address]  Phone: [phone]  Fax: [phone]  Follow Up Time: 2 weeks

## 2020-07-21 NOTE — PROGRESS NOTE ADULT - ATTENDING COMMENTS
Seen by me this AM. Sugars slowly coming down, not yet at target. Pain minimal. Ambulating with relative ease.     R knee ROM 10-90, stable liv/teodoro  Drain with seropurulent drainage  Sutures intact at portals  Incision and skin graft site unchanged in appearance from prior    Imp: 66y/o male with right knee chronic periprosthetic infection, beginning on antibiotic suppression  Plan as above. PICC tomorrow pending ongoing negative blood cultures. We may be able to consider discharge after PICC. Likely for longterm maintenance of the drain. He is aware that the limb is at risk should the infection flare again and cause systemic symptoms.

## 2020-07-21 NOTE — DISCHARGE NOTE PROVIDER - NSDCFUADDINST_GEN_ALL_CORE_FT
You have a PICC line and will be on IV antibiotics for 6 weeks. You will be on vancomycin 1g every 12 hours through August 26. You will also be on rifampin 300mg twice daily. Weekly CBC, CMP, ESR, CRP, and vanc troph should be drawn and results faxed to Dr. Casillas [phone].    Wet to dry dressing on left lower extremity vascular ulcer should be done by VNS once daily. Follow-up with Dr. Malloy outpatient in 1-2 weeks.    Weight bear as tolerated.  No strenuous activity, heavy lifting, driving or returning to work until cleared by MD.  You may shower. No soaking or submerging incision.      Try to have regular bowel movements, take stool softener or laxative if necessary.    Swelling may travel all the way down leg to foot, this is normal and will subside in a few weeks.  Call to schedule an appt with Dr. Castro for follow up, if you have staples or sutures they will be removed in office.  Contact your doctor if you experience: fever greater than 101.5, chills, chest pain, difficulty breathing, redness or excessive drainage around the incision, other concerns.  Follow up with your primary care provider. You have a PICC line and will be on IV antibiotics for 6 weeks. You will be on vancomycin 1g every 12 hours. You will also be on rifampin 300mg twice daily. Weekly CBC, CMP, ESR, CRP, and vanc troph should be drawn and results faxed to Dr. Casillas [phone].    Wet to dry dressing on left lower extremity vascular ulcer should be done once daily. Follow-up with Dr. Malloy outpatient in 1-2 weeks.    Weight bear as tolerated with knee in knee immobilizer at all times.  No strenuous activity, heavy lifting, driving or returning to work until cleared by MD.  You may shower. No soaking or submerging incision.      Try to have regular bowel movements, take stool softener or laxative if necessary.    Swelling may travel all the way down leg to foot, this is normal and will subside in a few weeks.  Call to schedule an appt with Dr. Castro for follow up, if you have staples or sutures they will be removed in office.  Contact your doctor if you experience: fever greater than 101.5, chills, chest pain, difficulty breathing, redness or excessive drainage around the incision, other concerns.  Follow up with your primary care provider. You have a PICC line and will be on IV antibiotics for 6 weeks. You will be on vancomycin 1g every 12 hours. You will also be on rifampin 300mg twice daily. Weekly CBC, CMP, ESR, CRP, and vanc troph should be drawn and results faxed to ____    Wet to dry dressing on left lower extremity vascular ulcer should be done once daily. Follow-up with Dr. Malloy outpatient in 1-2 weeks.    Weight bear as tolerated with knee in knee immobilizer at all times.  No strenuous activity, heavy lifting, driving or returning to work until cleared by MD.  You may shower. No soaking or submerging incision.      Try to have regular bowel movements, take stool softener or laxative if necessary.    Swelling may travel all the way down leg to foot, this is normal and will subside in a few weeks.  Call to schedule an appt with Dr. Castro for follow up, if you have staples or sutures they will be removed in office.  Contact your doctor if you experience: fever greater than 101.5, chills, chest pain, difficulty breathing, redness or excessive drainage around the incision, other concerns.  Follow up with your primary care provider. You have a PICC line and will be on IV antibiotics for 6 weeks. You will be on vancomycin 750g every 12 hours. You will also be on rifampin 300mg twice daily. Weekly CBC, CMP, ESR, CRP, and vanc troph should be drawn and results faxed to _____________.     You had a wound vac placed on your left lower extremity by plastic surgeon Dr. Bowen. Wound vac changes will be done Monday and Thursday.     Weight bear as tolerated with knee in knee immobilizer at all times.  No strenuous activity, heavy lifting, driving or returning to work until cleared by MD.  You may shower. No soaking or submerging incision.      Try to have regular bowel movements, take stool softener or laxative if necessary.    Swelling may travel all the way down leg to foot, this is normal and will subside in a few weeks.  Call to schedule an appt with Dr. Castro for follow up, if you have staples or sutures they will be removed in office.  Contact your doctor if you experience: fever greater than 101.5, chills, chest pain, difficulty breathing, redness or excessive drainage around the incision, other concerns.  Follow up with your primary care provider. You have a PICC line and will be on IV antibiotics for 6 weeks. You will be on vancomycin 750g every 12 hours. You will also be on rifampin 300mg twice daily. Weekly CBC, CMP, ESR, CRP, and vanc troph should be drawn and results faxed to _____________.     You had a wound vac placed on your left lower extremity by plastic surgeon Dr. Bowen. Wound vac changes will be done Monday and Thursday. The dimensions of your wound vac are 10x5cm  Touch toe weight bearing with knee in knee immobilizer at all times.  No strenuous activity, heavy lifting, driving or returning to work until cleared by MD.  You may shower. No soaking or submerging incision. Do not get wound vac wet.      Try to have regular bowel movements, take stool softener or laxative if necessary.    Swelling may travel all the way down leg to foot, this is normal and will subside in a few weeks.  Call to schedule an appt with Dr. Castro for follow up, if you have staples or sutures they will be removed in office.  Contact your doctor if you experience: fever greater than 101.5, chills, chest pain, difficulty breathing, redness or excessive drainage around the incision, other concerns.  Follow up with your primary care provider. You have a PICC line and will be on IV antibiotics for 6 weeks. You will be on vancomycin 750g every 12 hours. You will also be on rifampin 300mg twice daily. Weekly CBC, CMP, ESR, CRP, and vanc troph should be drawn and results faxed to Dr. Casillas at [phone]    You had a wound vac placed on your left lower extremity by plastic surgeon Dr. Bowen. Wound vac changes should be done once a week on Fridays for 4 weeks. Adaptik should be under the sponge. The dimensions of your wound vac are 10x5cm. You should follow-up in 3-4 weeks with Plastics (Dr. Bowen).    Touch toe weight bearing right leg. Weight bearing as tolerated on left leg. You may use your knee immobilizer on the right leg if you feel it helps with stability and ambulation with walker. If it feels heavy and cumbersome, you do not have to wear it.       No strenuous activity, heavy lifting, driving or returning to work until cleared by MD.  You may shower. No soaking or submerging incision.       Try to have regular bowel movements, take stool softener or laxative if necessary.    Swelling may travel all the way down leg to foot, this is normal and will subside in a few weeks.  Call to schedule an appt with Dr. Castro for follow up, if you have staples or sutures they will be removed in office.  Contact your doctor if you experience: fever greater than 101.5, chills, chest pain, difficulty breathing, redness or excessive drainage around the incision, other concerns.  Follow up with your primary care provider. You have a PICC line and will be on IV antibiotics for 6 weeks. You will be on vancomycin 750g every 12 hours. You will also be on rifampin 300mg twice daily. Weekly CBC, CMP, ESR, CRP, and vanc troph should be drawn and results faxed to Dr. Casillas at [phone]    You had a wound vac placed on your left lower extremity by plastic surgeon Dr. Bowen.   Upon discharge, vac instructions: V.A.C. wound care. Change 1 times per week on Fridays with Adaptik underneath sponge covering the entire wound. Ensure pressure set to 125 mmHg. If vac malfunctions, then dampen gauze with saline solution, pack into wound, cover with dry gauze and Kerlix wrap and call Dr. Bowen's office. The dimensions of your wound vac are 10x5cm. You should follow-up in 3-4 weeks with Plastics (Dr. Bowen).    Touch toe weight bearing right leg. Weight bearing as tolerated on left leg. You may use your knee immobilizer on the right leg if you feel it helps with stability and ambulation with walker. If it feels heavy and cumbersome, you do not have to wear it.       No strenuous activity, heavy lifting, driving or returning to work until cleared by MD.  You may shower. No soaking or submerging incision.       Try to have regular bowel movements, take stool softener or laxative if necessary.    Swelling may travel all the way down leg to foot, this is normal and will subside in a few weeks.  Call to schedule an appt with Dr. Castro for follow up, if you have staples or sutures they will be removed in office.  Contact your doctor if you experience: fever greater than 101.5, chills, chest pain, difficulty breathing, redness or excessive drainage around the incision, other concerns.  Follow up with your primary care provider. You have a PICC line and will be on IV antibiotics for 6 weeks. You will be on vancomycin 750g every 12 hours. You will also be on rifampin 300mg twice daily. Weekly CBC, CMP, ESR, CRP, and vanc troph should be drawn and results faxed to ID Dr. Casillas at [phone].    You should remain on subcutaenous heparin (5000u 3x/day), or some form of DVT ppx for 1 month after your last surgery (from 7/22/20).      You had a wound vac placed on your left lower extremity by plastic surgeon Dr. Bowen. For discharge, this was changed to a wet to dry dressing. This wound vac should be replied as soon as possible at home by visiting nurse, or at rehab.  Upon discharge, vac instructions: V.A.C. wound care. Change 1 times per week on Fridays with Adaptik underneath sponge covering the entire wound. Ensure pressure set to 125 mmHg. If vac malfunctions, then dampen gauze with saline solution, pack into wound, cover with dry gauze and Kerlix wrap and call Dr. Bowen's office. The dimensions of your wound vac are 10x5cm. You should follow-up in 3-4 weeks with Plastics (Dr. Bowen).    Touch toe weight bearing right leg. Weight bearing as tolerated on left leg. You may use your knee immobilizer on the right leg if you feel it helps with stability and ambulation with walker. If it feels heavy and cumbersome, you do not have to wear it.       No strenuous activity, heavy lifting, driving or returning to work until cleared by MD.  You may shower. No soaking or submerging incision.       Try to have regular bowel movements, take stool softener or laxative if necessary.    Swelling may travel all the way down leg to foot, this is normal and will subside in a few weeks.  Call to schedule an appt with Dr. Castro for follow up, if you have staples or sutures they will be removed in office.  Contact your doctor if you experience: fever greater than 101.5, chills, chest pain, difficulty breathing, redness or excessive drainage around the incision, other concerns.  Follow up with your primary care provider. You have a PICC line and will be on IV antibiotics for 6 weeks. You will be on vancomycin 750g every 12 hours. You will also be on rifampin 300mg twice daily. Weekly CBC, CMP, ESR, CRP, and vanc troph should be drawn and results faxed to ID Dr. Casillas at [phone].    You should remain on subcutaenous heparin (5000u 3x/day), or some form of DVT ppx for 1 month after your last surgery (from 7/22/20).    Please continue to place silvadene on R knee surgical incision 2x a day and cover with medipore dressing.      You had a wound vac placed on your left lower extremity by plastic surgeon Dr. Bowen. For discharge, this was changed to a wet to dry dressing. This wound vac should be replied as soon as possible at home by visiting nurse, or at rehab.  Upon discharge, vac instructions: V.A.C. wound care. Change 1 times per week on Fridays with Adaptik underneath sponge covering the entire wound. Ensure pressure set to 125 mmHg. If vac malfunctions, then dampen gauze with saline solution, pack into wound, cover with dry gauze and Kerlix wrap and call Dr. Bowen's office. The dimensions of your wound vac are 10x5cm. You should follow-up in 3-4 weeks with Plastics (Dr. Bowen).    Touch toe weight bearing right leg. Weight bearing as tolerated on left leg. You may use your knee immobilizer on the right leg if you feel it helps with stability and ambulation with walker. If it feels heavy and cumbersome, you do not have to wear it.       No strenuous activity, heavy lifting, driving or returning to work until cleared by MD.  You may shower. No soaking or submerging incision.       Try to have regular bowel movements, take stool softener or laxative if necessary.    Swelling may travel all the way down leg to foot, this is normal and will subside in a few weeks.  Call to schedule an appt with Dr. Castro for follow up, if you have staples or sutures they will be removed in office.  Contact your doctor if you experience: fever greater than 101.5, chills, chest pain, difficulty breathing, redness or excessive drainage around the incision, other concerns.  Follow up with your primary care provider. You have a PICC line and will be on IV antibiotics for 6 weeks. You will be on vancomycin 750g every 12 hours. You will also be on rifampin 300mg twice daily. Weekly CBC, CMP, ESR, CRP, and vanc troph should be drawn and results faxed to ID Dr. Casillas at [phone].    You should remain on subcutaenous heparin (5000u 3x/day), or some form of DVT ppx for 1 month after your last surgery (from 7/22/20).    Please continue to place silvadene on Right knee surgical incision 2x a day and cover with medipore dressing.      You had a wound vac placed on your left lower extremity by plastic surgeon Dr. Bowen. For discharge, this was changed to a wet to dry dressing. This wound vac should be reapplied as soon as possible at home by visiting nurse.   Upon discharge, vac instructions: V.A.C. wound care. Change 1 times per week on Fridays with Adaptik underneath sponge covering the entire wound. Ensure pressure set to 125 mmHg. If vac malfunctions, then dampen gauze with saline solution, pack into wound, cover with dry gauze and Kerlix wrap and call Dr. Bowen's office. The dimensions of your wound vac are 10x5cm. You should follow-up in 3-4 weeks with Plastics (Dr. Bowen).    Touch toe weight bearing right leg. Weight bearing as tolerated on left leg. You may use your knee immobilizer on the right leg if you feel it helps with stability and ambulation with walker. If it feels heavy and cumbersome, you do not have to wear it.       No strenuous activity, heavy lifting, driving or returning to work until cleared by MD.  You may shower. No soaking or submerging incision.       Try to have regular bowel movements, take stool softener or laxative if necessary.    Swelling may travel all the way down leg to foot, this is normal and will subside in a few weeks.  Call to schedule an appt with Dr. Castro for follow up, if you have staples or sutures they will be removed in office.  Contact your doctor if you experience: fever greater than 101.5, chills, chest pain, difficulty breathing, redness or excessive drainage around the incision, other concerns.  Follow up with your primary care provider.

## 2020-07-21 NOTE — DISCHARGE NOTE PROVIDER - HOSPITAL COURSE
Admitted 7/16/20    Surgery- right knee arthroscopic I+D 7/17    Vero-op Antibiotics/ ID consult    Cardiology consult, JOHN PAUL    Pain control    DVT prophylaxis    OOB/Physical Therapy Admitted 7/16/20    Surgery- right knee arthroscopic I+D 7/17    Vero-op Antibiotics/ ID consult    Cardiology consult, JOHN PAUL    Increasing inflammatory markers- R knee explant and antibiotic spacer 7/22/20    Pain control    DVT prophylaxis    OOB/Physical Therapy Admitted 7/16/20    Surgery- right knee arthroscopic I+D 7/17    Vero-op Antibiotics/ ID consult    Cardiology consult, JOHN PAUL    Increasing inflammatory markers- R knee explant and antibiotic spacer 7/22/20    Pain control    DVT prophylaxis    OOB/Physical Therapy    OR with Plastic Surgery Dr. Bowen for wound vac placement on LLE vascular wound

## 2020-07-21 NOTE — DISCHARGE NOTE NURSING/CASE MANAGEMENT/SOCIAL WORK - NSSCNAMETXT_GEN_ALL_CORE
[facility] AND [facility] ([facility])/[facility]

## 2020-07-21 NOTE — PROGRESS NOTE ADULT - SUBJECTIVE AND OBJECTIVE BOX
INTERVAL HISTORY:  Less knee pain. No chest pain or dyspnea.	  Chart, PMH medications and allergies reviewed    MEDICATIONS:  MEDICATIONS  (STANDING):  aspirin enteric coated 81 milliGRAM(s) Oral two times a day  atorvastatin 40 milliGRAM(s) Oral at bedtime  BACItracin   Ointment 1 Application(s) Topical daily  collagenase Ointment 1 Application(s) Topical daily  dextrose 5%. 1000 milliLiter(s) (50 mL/Hr) IV Continuous <Continuous>  dextrose 50% Injectable 12.5 Gram(s) IV Push once  dextrose 50% Injectable 25 Gram(s) IV Push once  dextrose 50% Injectable 25 Gram(s) IV Push once  digoxin     Tablet 0.125 milliGRAM(s) Oral daily  DULoxetine 90 milliGRAM(s) Oral daily  insulin glargine Injectable (LANTUS) 12 Unit(s) SubCutaneous at bedtime  insulin lispro (HumaLOG) corrective regimen sliding scale   SubCutaneous Before meals and at bedtime  insulin lispro Injectable (HumaLOG) 4 Unit(s) SubCutaneous three times a day before meals  lactated ringers. 1000 milliLiter(s) (50 mL/Hr) IV Continuous <Continuous>  metoprolol succinate ER 25 milliGRAM(s) Oral daily  nystatin Powder 1 Application(s) Topical two times a day  pantoprazole    Tablet 40 milliGRAM(s) Oral before breakfast  polyethylene glycol 3350 17 Gram(s) Oral daily  prasugrel 10 milliGRAM(s) Oral daily  rifAMPin 300 milliGRAM(s) Oral every 12 hours  tamsulosin 0.4 milliGRAM(s) Oral at bedtime  vancomycin  IVPB 1000 milliGRAM(s) IV Intermittent every 12 hours      REVIEW OF SYSTEMS:  CONSTITUTIONAL: No fever, no weight loss, no fatigue  PULMONARY: No cough, no wheezing, chills no hemoptysis; No Shortness of Breath  CARDIOVASCULAR: No chest pain, no palpitations, no syncope, dizziness, no leg swelling  GASTROINTESTINAL: No abdominal pain. No nausea, no  vomiting, no hematemesis; No diarrhea, no constipation. No melena, no hematochezia.  GENITOURINARY: No dysuria, no frequency, no hematuria, no incontinence  SKIN: No itching, no burning, no rashes, no lesions     PHYSICAL EXAM:  T(C): 36.7 (07-21-20 @ 12:28), Max: 37.6 (07-21-20 @ 05:28)  HR: 83 (07-21-20 @ 12:28) (81 - 93)  BP: 105/67 (07-21-20 @ 12:28) (105/67 - 124/73)  RR: 17 (07-21-20 @ 12:28) (16 - 18)  SpO2: 95% (07-21-20 @ 12:28) (94% - 97%)  Wt(kg): --  I&O's Summary    20 Jul 2020 07:01  -  21 Jul 2020 07:00  --------------------------------------------------------  IN: 1000 mL / OUT: 2260 mL / NET: -1260 mL        Appearance:  No deformities, normal appearance	  HEENT:   Normal oral mucosa, PERRL, EOMI	  Neck: No jvd , no masses  Lymphatic: No  lymphadenopathy  Pulmonary: Lungs clear to auscultation and percussion  Cardiovascular: Normal S1 S2, No JVD, No murmur, No edema	  Abdomen:  Soft, Non-tender, + BS  Skin: No rashes, No ecchymoses	  Extremities:  No clubbing or cyanosis  MSK: + joint swelling    LABS:		  CARDIAC MARKERS:                         10.8   11.82 )-----------( 332      ( 21 Jul 2020 07:41 )             35.6   07-21    136  |  100  |  16  ----------------------------<  208<H>  4.0   |  25  |  0.60    Ca    8.7      21 Jul 2020 07:41    TPro  6.1  /  Alb  2.7<L>  /  TBili  0.7  /  DBili  x   /  AST  16  /  ALT  28  /  AlkPhos  104  07-21    proBNP: Serum Pro-Brain Natriuretic Peptide: 9916 pg/mL (07-20 @ 16:40)   Lipid Profile:  HgA1c:  TSH:      Culture - Blood (collected 07-19-20 @ 15:11)  Source: .Blood Blood-Peripheral  Preliminary Report (07-20-20 @ 16:02):    No growth at 1 day.    Culture - Blood (collected 07-19-20 @ 15:11)  Source: .Blood Blood-Peripheral  Preliminary Report (07-20-20 @ 16:02):    No growth at 1 day.    Culture - Acid Fast - Tissue w/Smear (collected 07-17-20 @ 22:23)  Source: .Tissue Right Synovium #2    Culture - Fungal, Tissue (collected 07-17-20 @ 22:23)  Source: .Tissue Right Synovium #2  Preliminary Report (07-18-20 @ 15:21):    Testing in progress    Culture - Acid Fast - Body Fluid w/Smear (collected 07-17-20 @ 22:21)  Source: .Body Fluid Right Synovium #1, conical tube    Culture - Fungal, Body Fluid (collected 07-17-20 @ 22:21)  Source: .Body Fluid Right Synovium #1, conical tube  Preliminary Report (07-18-20 @ 15:12):    Testing in progress    Culture - Body Fluid with Gram Stain (collected 07-17-20 @ 14:37)  Source: .Body Fluid Right Synovium #1  Gram Stain (07-18-20 @ 11:42):    Few Gram Positive Cocci in Clusters    Numerous White blood cells  Final Report (07-19-20 @ 09:50):    Few Methicillin resistant Staphylococcus aureus    Floor previously notified.  Organism: Methicillin resistant Staphylococcus aureus  Methicillin resistant Staphylococcus aureus (07-19-20 @ 09:50)  Organism: Methicillin resistant Staphylococcus aureus (07-19-20 @ 09:50)      -  Cefazolin: R 8      -  Clindamycin: S <=0.25      -  Daptomycin: S 0.5      -  Erythromycin: R >4      -  Linezolid: S 2      -  Oxacillin: R >2      -  RIF- Rifampin: S <=1 Should not be used as monotherapy      -  Tetra/Doxy: S <=1      -  Trimethoprim/Sulfamethoxazole: R >2/38      Method Type: YOLIE  Organism: Methicillin resistant Staphylococcus aureus (07-19-20 @ 09:50)      -  Vancomycin: S 1.5      Method Type: ETEST    Culture - Tissue with Gram Stain (collected 07-17-20 @ 14:35)  Source: .Tissue Right Synovium #2  Gram Stain (07-17-20 @ 17:37):    No organisms seen    Rare WBC's  Final Report (07-19-20 @ 09:44):    Rare Methicillin resistant Staphylococcus aureus    Floor previously notified.  Organism: Methicillin resistant Staphylococcus aureus  Methicillin resistant Staphylococcus aureus (07-19-20 @ 09:44)  Organism: Methicillin resistant Staphylococcus aureus (07-19-20 @ 09:44)      -  Vancomycin: S 1.5      Method Type: ETEST  Organism: Methicillin resistant Staphylococcus aureus (07-19-20 @ 09:44)      -  Cefazolin: R >16      -  Clindamycin: S <=0.25      -  Daptomycin: S 0.5      -  Erythromycin: R >4      -  Linezolid: S 2      -  Oxacillin: R >2      -  RIF- Rifampin: S <=1 Should not be used as monotherapy      -  Tetra/Doxy: S <=1      -  Trimethoprim/Sulfamethoxazole: R >2/38      Method Type: YOLIE    Culture - Acid Fast - Body Fluid w/Smear (collected 07-17-20 @ 06:13)  Source: .Body Fluid knee  Preliminary Report (07-18-20 @ 15:03):    Culture is being performed.    Culture - Acid Fast - Body Fluid w/Smear (collected 07-17-20 @ 06:13)  Source: .Body Fluid knee aspirate  Preliminary Report (07-18-20 @ 15:03):    Culture is being performed.    Culture - Aspirate with Gram Stain (collected 07-17-20 @ 01:29)  Source: .Aspirate Knee Aspirate Fluid  Gram Stain (07-18-20 @ 12:22):    **Please Note**: This is a Corrected Report**    Previously reported as: No organisms seen Few WBC's    Result called to and read back byCuauhtemoc Murphy RN  07/18/2020 12:12:13  Final Report (07-19-20 @ 09:46):    Few-moderate Methicillin resistant Staphylococcus aureus    Floor previously notified.  Organism: Methicillin resistant Staphylococcus aureus  Methicillin resistant Staphylococcus aureus (07-19-20 @ 09:46)  Organism: Methicillin resistant Staphylococcus aureus (07-19-20 @ 09:46)      -  Cefazolin: R 8      -  Clindamycin: S <=0.25      -  Daptomycin: S 0.5      -  Erythromycin: R >4      -  Linezolid: S 2      -  Oxacillin: R >2      -  RIF- Rifampin: S <=1 Should not be used as monotherapy      -  Tetra/Doxy: S <=1      -  Trimethoprim/Sulfamethoxazole: R >2/38      Method Type: YOLIE  Organism: Methicillin resistant Staphylococcus aureus (07-19-20 @ 09:46)      -  Vancomycin: S 1.5      Method Type: ETEST    Culture - Aspirate with Gram Stain (collected 07-17-20 @ 01:29)  Source: .Aspirate Knee Aspirate Fluid  Gram Stain (07-18-20 @ 12:12):    **Please Note**: This is a Corrected Report**    Previously reported as: No organisms seen Few WBC's    Corrected : Gram Positive Cocci in Clusters Moderate WBC's    Result called to and read back byCuauhtemoc Murphy RN  07/18/2020 12:12:13  Final Report (07-19-20 @ 09:47):    Moderate Methicillin resistant Staphylococcus aureus    Floor previously notified.  Organism: Methicillin resistant Staphylococcus aureus  Methicillin resistant Staphylococcus aureus (07-19-20 @ 09:47)  Organism: Methicillin resistant Staphylococcus aureus (07-19-20 @ 09:47)      -  Cefazolin: R 16      -  Clindamycin: S <=0.25      -  Daptomycin: S 0.5      -  Erythromycin: R >4      -  Linezolid: S 4      -  Oxacillin: R >2      -  RIF- Rifampin: S <=1 Should not be used as monotherapy      -  Tetra/Doxy: S <=1      -  Trimethoprim/Sulfamethoxazole: R >2/38      Method Type: YOLIE  Organism: Methicillin resistant Staphylococcus aureus (07-19-20 @ 09:47)      -  Vancomycin: S 1.5      Method Type: ETEST    Culture - Blood (collected 07-16-20 @ 22:00)  Source: .Blood Blood-Peripheral  Gram Stain (07-17-20 @ 19:11):    Aerobic Bottle: Gram Positive Cocci in Clusters    Result called to and read back byCuauhtemoc Cortes RN (10LAC)  07/17/2020    18:32:43    Anaerobic Bottle: Gram Positive Cocci in Clusters    Floor previously notified.    ***Blood Panel PCR results on this specimen are available    approximately 3 hours after the Gram stain result.***    Gram stain, PCR, and/or culture results may not always    correspond due to difference in methodologies.    ************************************************************    This PCR assay was performed using [employer].    The following targets are tested for: Enterococcus,    vancomycin resistant enterococci, Listeria monocytogenes,    coagulase negative staphylococci, S. aureus,    methicillin resistant S. aureus, Streptococcus agalactiae    (Group B), S. pneumoniae, S. pyogenes (Group A),    Acinetobacter baumannii, Enterobacter cloacae, E. coli,    Klebsiella oxytoca, K. pneumoniae, Proteus sp.,    Serratia marcescens, Haemophilus influenzae,    Neisseria meningitidis, Pseudomonas aeruginosa, Candida    albicans, C. glabrata, C krusei, C parapsilosis,    C. tropicalis and the KPC resistance gene.  Final Report (07-19-20 @ 09:03):    Growth in aerobic and anaerobic bottles: Methicillin resistant    Staphylococcus aureus    Floor previously notified.  Organism: Methicillin resistant Staphylococcus aureus  Methicillin resistant Staphylococcus aureus  Blood Culture PCR (07-19-20 @ 09:03)  Organism: Blood Culture PCR (07-19-20 @ 09:03)      -  Methicillin resistant Staphylococcus aureus (MRSA): Sameer Jones RN      Method Type: PCR  Organism: Methicillin resistant Staphylococcus aureus (07-19-20 @ 09:03)      -  Vancomycin: S 1.5      Method Type: ETEST  Organism: Methicillin resistant Staphylococcus aureus (07-19-20 @ 09:03)      -  Cefazolin: R 16      -  Clindamycin: S <=0.25      -  Daptomycin: S 0.5      -  Erythromycin: R >4      -  Linezolid: S 2      -  Oxacillin: R >2      -  RIF- Rifampin: S <=1 Should not be used as monotherapy      -  Tetra/Doxy: S <=1      -  Trimethoprim/Sulfamethoxazole: R >2/38      -  Vancomycin: S 1      Method Type: YOLIE    Culture - Blood (collected 07-16-20 @ 22:00)  Source: .Blood Blood-Peripheral  Gram Stain (07-17-20 @ 19:45):    Aerobic Bottle: Gram Positive Cocci in Clusters    Result called to and read back byCuauhtemoc Redmond RN (10LAC)  07/17/2020    19:01:18    Anaerobic Bottle: Gram Positive Cocci in Clusters    Floor previously notified.  Final Report (07-19-20 @ 08:54):    Growth in aerobic and anaerobic bottles: Methicillin resistant    Staphylococcus aureus  Organism: Methicillin resistant Staphylococcus aureus  Methicillin resistant Staphylococcus aureus (07-19-20 @ 08:55)  Organism: Methicillin resistant Staphylococcus aureus (07-19-20 @ 08:55)      -  Vancomycin: S 1.5      Method Type: ETEST  Organism: Methicillin resistant Staphylococcus aureus (07-19-20 @ 08:55)      -  Cefazolin: R 16      -  Clindamycin: S <=0.25      -  Daptomycin: S 0.5      -  Erythromycin: R >4      -  Linezolid: S 2      -  Oxacillin: R >2      -  RIF- Rifampin: S <=1 Should not be used as monotherapy      -  Tetra/Doxy: S <=1      -  Trimethoprim/Sulfamethoxazole: R >2/38      -  Vancomycin: S 1      Method Type: YOLIE    Culture - Urine (collected 07-16-20 @ 21:12)  Source: .Urine Clean Catch (Midstream)  Final Report (07-17-20 @ 15:13):    No growth      TELEMETRY: 	      QTC     ECG:  	   QTC         RADIOLOGY:   DIAGNOSTIC TESTING: [ ] Echocardiogram: [ ]  Catheterization: [ ] Stress Test:      ASSESSMENT/PLAN: 	  MRSA sepsis- patient is at risk for infected leads. JOHN PAUL is negative. Defibrillator pocket looks good.  Follow repeat blood cultures.     Severely reduced ejection fraction-the patient takes diuretics occasionally for weight gain. He is sedentary however denies dyspnea. His pulmonary pressures are high on echo. check BNP is elevated but chest x-ray is clear. Rx;d with  metoprolol 25mg daily and digoxin. When stable post op consider diuretic or discontinue fluids.     Coronary artery disease-the patient denies chest discomfort. His EKG is uninterpretable secondary to the pacemaker. He was revascularized less than two years ago. There is no clinical evidence for ischemia. Rx;d with aspirin and atorvastatin.  Rx'd with  prasugrel with history of stent thrombosis.     Defibrillator-off monitor.    Discussed with PA.      Discussed with resident.

## 2020-07-21 NOTE — DISCHARGE NOTE PROVIDER - NSDCHHNEEDSERVICE_GEN_ALL_CORE
Teaching and training/Rehabilitation services/Wound care and assessment/Medication teaching and assessment/Observation and assessment

## 2020-07-21 NOTE — DISCHARGE NOTE PROVIDER - PROVIDER TOKENS
PROVIDER:[TOKEN:[[id number]],FOLLOWUP:[2 weeks]],PROVIDER:[TOKEN:[[id number]],FOLLOWUP:[2 weeks]],PROVIDER:[TOKEN:[[id number]],FOLLOWUP:[1 week]],PROVIDER:[TOKEN:[[id number]],FOLLOWUP:[2 weeks]] PROVIDER:[TOKEN:[[id number]],FOLLOWUP:[2 weeks]],PROVIDER:[TOKEN:[[id number]],FOLLOWUP:[2 weeks]],PROVIDER:[TOKEN:[[id number]],FOLLOWUP:[1 week]],PROVIDER:[TOKEN:[[id number]],FOLLOWUP:[2 weeks]],PROVIDER:[TOKEN:[[id number]],FOLLOWUP:[1 week]] PROVIDER:[TOKEN:[[id number]],FOLLOWUP:[1 week]],PROVIDER:[TOKEN:[[id number]],FOLLOWUP:[2 weeks]],PROVIDER:[TOKEN:[[id number]],FOLLOWUP:[1 week]],PROVIDER:[TOKEN:[[id number]],FOLLOWUP:[2 weeks]],PROVIDER:[TOKEN:[[id number]],FOLLOWUP:[2 weeks]]

## 2020-07-21 NOTE — PROGRESS NOTE ADULT - SUBJECTIVE AND OBJECTIVE BOX
Interval Events: Reviewed  Patient seen and examined at bedside.    Patient is a 65y old  Male who presents with a chief complaint of septic knee (20 Jul 2020 14:10)    he is doing better  PAST MEDICAL & SURGICAL HISTORY:  Diabetic neuropathy  STEMI (ST elevation myocardial infarction)  Diverticulitis  MRSA bacteremia  History of celiac disease  CHF (congestive heart failure): EF ~ 25%  HTN (hypertension)  Diabetes: on insulin pump  Blood clot due to device, implant, or graft: was on blood thinners  HLD (hyperlipidemia)  Osteoarthritis  Atherosclerosis of coronary artery: CAD (coronary artery disease)  Status post percutaneous transluminal coronary angioplasty: in 2012  History of open reduction and internal fixation (ORIF) procedure  Surgery, elective: Right shoulder  Surgery, elective: right knee wound debridement  S/P TKR (total knee replacement), right: with infection Mrsa   per pt he was cleared from MRSA infection  S/P CABG x 1: 2018  Stented coronary artery: 10/18 heart attack  INFERIOR WALL MI  Other postprocedural status: Fixation hardware in foot LEFT      MEDICATIONS:  Pulmonary:    Antimicrobials:  rifAMPin 300 milliGRAM(s) Oral every 12 hours  vancomycin  IVPB 1000 milliGRAM(s) IV Intermittent every 12 hours    Anticoagulants:  aspirin enteric coated 81 milliGRAM(s) Oral two times a day  prasugrel 10 milliGRAM(s) Oral daily    Cardiac:  digoxin     Tablet 0.125 milliGRAM(s) Oral daily  metoprolol succinate ER 25 milliGRAM(s) Oral daily  tamsulosin 0.4 milliGRAM(s) Oral at bedtime      Allergies    ACE inhibitors (Hives)  carvedilol (Other)  enalapril (Hives)  Entresto (Other)    Intolerances        Vital Signs Last 24 Hrs  T(C): 36.7 (21 Jul 2020 12:28), Max: 37.6 (21 Jul 2020 05:28)  T(F): 98.1 (21 Jul 2020 12:28), Max: 99.6 (21 Jul 2020 05:28)  HR: 83 (21 Jul 2020 12:28) (81 - 93)  BP: 105/67 (21 Jul 2020 12:28) (105/67 - 124/73)  BP(mean): --  RR: 17 (21 Jul 2020 12:28) (16 - 18)  SpO2: 95% (21 Jul 2020 12:28) (94% - 97%)    07-20 @ 07:01  -  07-21 @ 07:00  --------------------------------------------------------  IN: 1000 mL / OUT: 2260 mL / NET: -1260 mL          Review of Systems:   •	General: negative  •	Skin/Breast: negative  •	Ophthalmologic: negative  •	ENMT: negative  •	Respiratory and Thorax: negative  •	Cardiovascular: negative  •	Gastrointestinal: negative  •	Genitourinary: negative  •	Musculoskeletal: negative  •	Neurological: negative  •	Psychiatric: negative  •	Hematology/Lymphatics: negative  •	Endocrine: negative  •	Allergic/Immunologic: negative    Physical Exam:   • Constitutional:	Well-developed, well nourished  • Eyes:	EOMI; PERRL; no drainage or redness  • ENMT:	No oral lesions; no gross abnormalities  • Neck	No bruits; no thyromegaly or nodules  • Breasts:	not examined  • Back:	No deformity or limitation of movement  • Respiratory:	Breath Sounds equal & clear to percussion & auscultation, no accessory muscle use  • Cardiovascular:	Regular rate & rhythm, normal S1, S2; no murmurs, gallops or rubs; no S3, S4  • Gastrointestinal:	Soft, non-tender, no hepatosplenomegaly, normal bowel sounds  • Genitourinary:	not examined  • Rectal: not examined  • Extremities:	No cyanosis, clubbing or edema  • Vascular:	Equal and normal pulses (carotid, femoral, dorsalis pedis)  • Neurologica:l	not examined  • Skin:	No lesions; no rash  • Lymph Nodes:	No lymphadedenopathy  • Musculoskeletal:	No joint pain, swelling or deformity; no limitation of movement        LABS:      CBC Full  -  ( 21 Jul 2020 07:41 )  WBC Count : 11.82 K/uL  RBC Count : 4.58 M/uL  Hemoglobin : 10.8 g/dL  Hematocrit : 35.6 %  Platelet Count - Automated : 332 K/uL  Mean Cell Volume : 77.7 fl  Mean Cell Hemoglobin : 23.6 pg  Mean Cell Hemoglobin Concentration : 30.3 gm/dL  Auto Neutrophil # : x  Auto Lymphocyte # : x  Auto Monocyte # : x  Auto Eosinophil # : x  Auto Basophil # : x  Auto Neutrophil % : x  Auto Lymphocyte % : x  Auto Monocyte % : x  Auto Eosinophil % : x  Auto Basophil % : x    07-21    136  |  100  |  16  ----------------------------<  208<H>  4.0   |  25  |  0.60    Ca    8.7      21 Jul 2020 07:41    TPro  6.1  /  Alb  2.7<L>  /  TBili  0.7  /  DBili  x   /  AST  16  /  ALT  28  /  AlkPhos  104  07-21              < from: JOHN PAUL w/Doppler (07.21.20 @ 08:58) >  CONCLUSIONS:     1. Severely reduced left ventricular systolic function.   2. Mildly reduced right ventricular systolic function. No vegetations seen on the device leads.   3. The aortic valve is tricuspid with normal structure and function without stenosis. There is trace aortic regurgitation. There is a small linear fibrinous strand on the ventricular aspect of the aortic valve along the coaptation line measuring    3 mm, likely Lambl's excrescence. Clinical correlation is advised. Normal mitral, tricuspid and pulmonic valves.   4. Mild mitral regurgitation.   5. No pericardial effusion.   6. No prior echo is available for comparison.    < end of copied text >        Culture Results:   No growth at 1 day. (07-19 @ 15:11)  Culture Results:   No growth at 1 day. (07-19 @ 15:11)      RADIOLOGY & ADDITIONAL STUDIES (The following images were personally reviewed):  Loja:                                     No  Urine output:                       adequate  DVT prophylaxis:                 Yes  Flattus:                                  Yes  Bowel movement:              No

## 2020-07-21 NOTE — DISCHARGE NOTE PROVIDER - NSDCMRMEDTOKEN_GEN_ALL_CORE_FT
acetaminophen 325 mg oral tablet: 2 tab(s) orally every 6 hours, As Needed - for pain or fever  aspirin 81 mg oral delayed release tablet: 1 tab(s) orally once a day  atorvastatin 40 mg oral tablet: 1 tab(s) orally once a day (at bedtime)  Colace 100 mg oral capsule: 1 cap(s) orally 2 times a day, As Needed -for constipation   digoxin 125 mcg (0.125 mg) oral tablet: 1 tab(s) orally once a day  DULoxetine 30 mg oral delayed release capsule: 3 cap(s) orally once a day  metoprolol succinate 25 mg oral tablet, extended release: 1 tab(s) orally once a day  NS FLUSH 10 mL: administer before and after each infusion  oxycodone-acetaminophen 5 mg-325 mg oral tablet: 1 tab(s) orally every 6 hours, As Needed -for severe pain MDD:4   pantoprazole 40 mg oral delayed release tablet: 1 tab(s) orally once a day, take 30 minutes before breakfast  prasugrel 10 mg oral tablet: 1 tab(s) orally once a day  tamsulosin 0.4 mg oral capsule: 1 cap(s) orally once a day (at bedtime) acetaminophen 325 mg oral tablet: 2 tab(s) orally every 6 hours, As Needed - for pain or fever  aspirin 81 mg oral delayed release tablet: 1 tab(s) orally once a day  atorvastatin 40 mg oral tablet: 1 tab(s) orally once a day (at bedtime)  CBC, CMP, ESR, CRP, VANCO TROPH: 1x/ week for 6 weeks    start date: Monday 7/27/20    fax results to Dr. Casillas ([place]):  [phone]  Colace 100 mg oral capsule: 1 cap(s) orally 2 times a day, As Needed -for constipation   digoxin 125 mcg (0.125 mg) oral tablet: 1 tab(s) orally once a day  DULoxetine 30 mg oral delayed release capsule: 3 cap(s) orally once a day  heparin 3mL flush from 5mL syringe: administer after each infusion  metoprolol succinate 25 mg oral tablet, extended release: 1 tab(s) orally once a day  NS FLUSH 10 mL: administer before and after each infusion  oxycodone-acetaminophen 5 mg-325 mg oral tablet: 1 tab(s) orally every 6 hours, As Needed -for severe pain MDD:4   pantoprazole 40 mg oral delayed release tablet: 1 tab(s) orally once a day, take 30 minutes before breakfast  prasugrel 10 mg oral tablet: 1 tab(s) orally once a day  tamsulosin 0.4 mg oral capsule: 1 cap(s) orally once a day (at bedtime)  vancomycin 1g in NS 250ml every 12 hours : Last date of administration:     diagnosis: T84.53XA acetaminophen 325 mg oral tablet: 2 tab(s) orally every 6 hours, As Needed - for pain or fever  aspirin 81 mg oral delayed release tablet: 1 tab(s) orally once a day  atorvastatin 40 mg oral tablet: 1 tab(s) orally once a day (at bedtime)  CBC, CMP, ESR, CRP, VANCO TROPH: 1x/ week for 6 weeks    start date: Monday 7/27/20    fax results to Dr. Casillas ([place]):  [phone]  Colace 100 mg oral capsule: 1 cap(s) orally 2 times a day, As Needed -for constipation   digoxin 125 mcg (0.125 mg) oral tablet: 1 tab(s) orally once a day  DULoxetine 30 mg oral delayed release capsule: 3 cap(s) orally once a day  heparin 3mL flush from 5mL syringe: administer after each infusion  metoprolol succinate 25 mg oral tablet, extended release: 1 tab(s) orally once a day  NS FLUSH 10 mL: administer before and after each infusion  oxycodone-acetaminophen 5 mg-325 mg oral tablet: 1 tab(s) orally every 6 hours, As Needed -for severe pain MDD:4   pantoprazole 40 mg oral delayed release tablet: 1 tab(s) orally once a day, take 30 minutes before breakfast  prasugrel 10 mg oral tablet: 1 tab(s) orally once a day  spironolactone 25 mg oral tablet: 1 tab(s) orally once a day  tamsulosin 0.4 mg oral capsule: 1 cap(s) orally once a day (at bedtime)  vancomycin 1g in NS 250ml every 12 hours : Last date of administration:     diagnosis: T84.53XA  Zetia 10 mg oral tablet: 1 tab(s) orally once a day acetaminophen 325 mg oral tablet: 2 tab(s) orally every 6 hours, As Needed - for pain or fever  aspirin 81 mg oral delayed release tablet: 1 tab(s) orally once a day  atorvastatin 40 mg oral tablet: 1 tab(s) orally once a day (at bedtime)  CBC, CMP, ESR, CRP, VANCO TROPH: 1x/ week for 6 weeks    start date: Monday 7/27/20    fax results to Dr. Casillas ([place]):  [phone]  Colace 100 mg oral capsule: 1 cap(s) orally 2 times a day, As Needed -for constipation   digoxin 125 mcg (0.125 mg) oral tablet: 1 tab(s) orally once a day  DULoxetine 30 mg oral delayed release capsule: 3 cap(s) orally once a day  heparin 3mL flush from 5mL syringe: administer after each infusion  metoprolol succinate 25 mg oral tablet, extended release: 1 tab(s) orally once a day  NS FLUSH 10 mL: administer before and after each infusion  oxycodone-acetaminophen 5 mg-325 mg oral tablet: 1 tab(s) orally every 6 hours, As Needed -for severe pain MDD:4   pantoprazole 40 mg oral delayed release tablet: 1 tab(s) orally once a day, take 30 minutes before breakfast  prasugrel 10 mg oral tablet: 1 tab(s) orally once a day  spironolactone 25 mg oral tablet: 1 tab(s) orally once a day  tamsulosin 0.4 mg oral capsule: 1 cap(s) orally once a day (at bedtime)  vancomycin 1g in NS 250ml every 12 hours : Last date of administration:     diagnosis: T84.53XA  Zetia 10 mg oral tablet: 1 tab(s) orally once a day acetaminophen 325 mg oral tablet: 2 tab(s) orally every 6 hours, As Needed - for pain or fever  aspirin 81 mg oral delayed release tablet: 1 tab(s) orally once a day  atorvastatin 40 mg oral tablet: 1 tab(s) orally once a day (at bedtime)  CBC, CMP, ESR, CRP, VANCO TROPH: 1x/ week for 6 weeks    start date: Monday 7/27/20    fax results to Dr. Casillas ([place]):  [phone]  Colace 100 mg oral capsule: 1 cap(s) orally 2 times a day, As Needed -for constipation   digoxin 125 mcg (0.125 mg) oral tablet: 1 tab(s) orally once a day  DULoxetine 30 mg oral delayed release capsule: 3 cap(s) orally once a day  heparin 3mL flush from 5mL syringe: administer after each infusion  metoprolol succinate 25 mg oral tablet, extended release: 1 tab(s) orally once a day  NS FLUSH 10 mL: administer before and after each infusion  oxycodone-acetaminophen 5 mg-325 mg oral tablet: 1 tab(s) orally every 6 hours, As Needed -for severe pain MDD:4   pantoprazole 40 mg oral delayed release tablet: 1 tab(s) orally once a day, take 30 minutes before breakfast  prasugrel 10 mg oral tablet: 1 tab(s) orally once a day  spironolactone 25 mg oral tablet: 1 tab(s) orally once a day  tamsulosin 0.4 mg oral capsule: 1 cap(s) orally once a day (at bedtime)  vancomycin 1g in NS 250ml every 12 hours : Last date of administration:     diagnosis: T84.53XA  Zetia 10 mg oral tablet: 1 tab(s) orally once a day acetaminophen 325 mg oral tablet: 2 tab(s) orally every 6 hours, As Needed - for pain or fever  aspirin 81 mg oral delayed release tablet: 1 tab(s) orally once a day  atorvastatin 40 mg oral tablet: 1 tab(s) orally once a day (at bedtime)  CBC, CMP, ESR, CRP, VANCO TROPH: 1x/ week for 6 weeks    start date: Monday 7/27/20    fax results to Dr. Casillas ([place]):  [phone]  Colace 100 mg oral capsule: 1 cap(s) orally 2 times a day, As Needed -for constipation   digoxin 125 mcg (0.125 mg) oral tablet: 1 tab(s) orally once a day  DULoxetine 30 mg oral delayed release capsule: 3 cap(s) orally once a day  heparin 3mL flush from 5mL syringe: administer after each infusion  metoprolol succinate 25 mg oral tablet, extended release: 1 tab(s) orally once a day  NS FLUSH 10 mL: administer before and after each infusion  oxycodone-acetaminophen 5 mg-325 mg oral tablet: 1 tab(s) orally every 6 hours, As Needed -for severe pain MDD:4   pantoprazole 40 mg oral delayed release tablet: 1 tab(s) orally once a day, take 30 minutes before breakfast  prasugrel 10 mg oral tablet: 1 tab(s) orally once a day  spironolactone 25 mg oral tablet: 1 tab(s) orally once a day  tamsulosin 0.4 mg oral capsule: 1 cap(s) orally once a day (at bedtime)  vancomycin 1g in NS 250ml every 12 hours : Last date of administration:     diagnosis: T84.53XA  Zetia 10 mg oral tablet: 1 tab(s) orally once a day Abx - : Vancomycin 750mg every 12 hours. Last day of administration: 09/02/2020 Dx: MRSA infection right knee  acetaminophen 325 mg oral tablet: 2 tab(s) orally every 6 hours, As Needed - for pain or fever  aspirin 81 mg oral delayed release tablet: 1 tab(s) orally once a day  atorvastatin 40 mg oral tablet: 1 tab(s) orally once a day (at bedtime)  CBC, CMP, ESR, CRP, VANCO TROPH: 1x/ week for 6 weeks    start date: Monday 7/27/20    fax results to Dr. Casillas ([place]):  [phone]  Colace 100 mg oral capsule: 1 cap(s) orally 2 times a day, As Needed -for constipation   digoxin 125 mcg (0.125 mg) oral tablet: 1 tab(s) orally once a day  DULoxetine 30 mg oral delayed release capsule: 3 cap(s) orally once a day  heparin 3mL flush from 5mL syringe: administer after each infusion  Labs - : CBC,CMP,ESR,CRP,Vanco trough once per week x 6 weeks. Start date: 08/10/2020 Send results to [place] Dr. Casillas at fax: [phone]  metoprolol succinate 25 mg oral tablet, extended release: 1 tab(s) orally once a day  NS FLUSH 10 mL: administer before and after each infusion  oxycodone-acetaminophen 5 mg-325 mg oral tablet: 1 tab(s) orally every 6 hours, As Needed -for severe pain MDD:4   pantoprazole 40 mg oral delayed release tablet: 1 tab(s) orally once a day, take 30 minutes before breakfast  prasugrel 10 mg oral tablet: 1 tab(s) orally once a day  spironolactone 25 mg oral tablet: 1 tab(s) orally once a day  tamsulosin 0.4 mg oral capsule: 1 cap(s) orally once a day (at bedtime)  vancomycin 1g in NS 250ml every 12 hours : Last date of administration:     diagnosis: T84.53XA  Zetia 10 mg oral tablet: 1 tab(s) orally once a day Abx - : Vancomycin 750mg every 12 hours. Last day of administration: 09/02/2020 Dx: MRSA infection right knee  acetaminophen 325 mg oral tablet: 2 tab(s) orally every 6 hours, As Needed - for pain or fever  aspirin 81 mg oral delayed release tablet: 1 tab(s) orally once a day  atorvastatin 40 mg oral tablet: 1 tab(s) orally once a day (at bedtime)  CBC, CMP, ESR, CRP, VANCO TROPH: 1x/ week for 6 weeks    start date: Monday 7/27/20    fax results to Dr. Casillas ([place]):  [phone]  Colace 100 mg oral capsule: 1 cap(s) orally 2 times a day, As Needed -for constipation   digoxin 125 mcg (0.125 mg) oral tablet: 1 tab(s) orally once a day  DULoxetine 30 mg oral delayed release capsule: 3 cap(s) orally once a day  heparin 3mL flush from 5mL syringe: administer after each infusion  Labs - : CBC,CMP,ESR,CRP,Vanco trough once per week x 6 weeks. Start date: 08/10/2020 Send results to [place] Dr. Casillas at fax: [phone]  metoprolol succinate 25 mg oral tablet, extended release: 1 tab(s) orally once a day  NS FLUSH 10 mL: administer before and after each infusion  oxycodone-acetaminophen 5 mg-325 mg oral tablet: 1 tab(s) orally every 6 hours, As Needed -for severe pain MDD:4   pantoprazole 40 mg oral delayed release tablet: 1 tab(s) orally once a day, take 30 minutes before breakfast  prasugrel 10 mg oral tablet: 1 tab(s) orally once a day  spironolactone 25 mg oral tablet: 1 tab(s) orally once a day  tamsulosin 0.4 mg oral capsule: 1 cap(s) orally once a day (at bedtime)  vancomycin 1g in NS 250ml every 12 hours : Last date of administration:     diagnosis: T84.53XA  Zetia 10 mg oral tablet: 1 tab(s) orally once a day Abx - : Vancomycin 750mg every 12 hours. Last day of administration: 09/02/2020 Dx: MRSA infection right knee  acetaminophen 325 mg oral tablet: 2 tab(s) orally every 6 hours, As Needed - for pain or fever  aspirin 81 mg oral delayed release tablet: 1 tab(s) orally once a day  atorvastatin 40 mg oral tablet: 1 tab(s) orally once a day (at bedtime)  CBC, CMP, ESR, CRP, VANCO TROPH: 1x/ week for 6 weeks    start date: Monday 7/27/20    fax results to Dr. Casillas ([place]):  [phone]  Colace 100 mg oral capsule: 1 cap(s) orally 2 times a day, As Needed -for constipation   digoxin 125 mcg (0.125 mg) oral tablet: 1 tab(s) orally once a day  DULoxetine 30 mg oral delayed release capsule: 3 cap(s) orally once a day  heparin 3mL flush from 5mL syringe: administer after each infusion  Labs - : CBC,CMP,ESR,CRP,Vanco trough once per week x 6 weeks. Start date: 08/10/2020 Send results to [place] Dr. Casillas at fax: [phone]  metoprolol succinate 25 mg oral tablet, extended release: 1 tab(s) orally once a day  NS FLUSH 10 mL: administer before and after each infusion  oxycodone-acetaminophen 5 mg-325 mg oral tablet: 1 tab(s) orally every 6 hours, As Needed -for severe pain MDD:4   pantoprazole 40 mg oral delayed release tablet: 1 tab(s) orally once a day, take 30 minutes before breakfast  prasugrel 10 mg oral tablet: 1 tab(s) orally once a day  spironolactone 25 mg oral tablet: 1 tab(s) orally once a day  tamsulosin 0.4 mg oral capsule: 1 cap(s) orally once a day (at bedtime)  vancomycin 1g in NS 250ml every 12 hours : Last date of administration:     diagnosis: T84.53XA  Zetia 10 mg oral tablet: 1 tab(s) orally once a day Abx - : Vancomycin 750mg every 12 hours. Last day of administration: 09/02/2020 Dx: MRSA infection right knee  acetaminophen 325 mg oral tablet: 2 tab(s) orally every 6 hours, As Needed - for pain or fever  aspirin 81 mg oral delayed release tablet: 1 tab(s) orally once a day  atorvastatin 40 mg oral tablet: 1 tab(s) orally once a day (at bedtime)  CBC, CMP, ESR, CRP, VANCO TROPH: 1x/ week for 6 weeks    start date: Monday 7/27/20    fax results to Dr. Casillas ([place]):  [phone]  Colace 100 mg oral capsule: 1 cap(s) orally 2 times a day, As Needed -for constipation   digoxin 125 mcg (0.125 mg) oral tablet: 1 tab(s) orally once a day  DULoxetine 30 mg oral delayed release capsule: 3 cap(s) orally once a day  heparin 3mL flush from 5mL syringe: administer after each infusion  Labs - : CBC,CMP,ESR,CRP,Vanco trough once per week x 6 weeks. Start date: 08/10/2020 Send results to [place] Dr. Casillas at fax: [phone]  metoprolol succinate 25 mg oral tablet, extended release: 1 tab(s) orally once a day  NS FLUSH 10 mL: administer before and after each infusion  oxycodone-acetaminophen 5 mg-325 mg oral tablet: 1 tab(s) orally every 6 hours, As Needed -for severe pain MDD:4   pantoprazole 40 mg oral delayed release tablet: 1 tab(s) orally once a day, take 30 minutes before breakfast  prasugrel 10 mg oral tablet: 1 tab(s) orally once a day  spironolactone 25 mg oral tablet: 1 tab(s) orally once a day  tamsulosin 0.4 mg oral capsule: 1 cap(s) orally once a day (at bedtime)  vancomycin 1g in NS 250ml every 12 hours : Last date of administration:     diagnosis: T84.53XA  Zetia 10 mg oral tablet: 1 tab(s) orally once a day Abx - : Vancomycin 750mg every 12 hours. Last day of administration: 09/02/2020 Dx: MRSA infection right knee  acetaminophen 325 mg oral tablet: 2 tab(s) orally every 6 hours, As Needed - for pain or fever  aspirin 81 mg oral delayed release tablet: 1 tab(s) orally once a day  atorvastatin 40 mg oral tablet: 1 tab(s) orally once a day (at bedtime)  CBC, CMP, ESR, CRP, VANCO TROPH: 1x/ week for 6 weeks    start date: Monday 7/27/20    fax results to Dr. Casillas ([place]):  [phone]  Colace 100 mg oral capsule: 1 cap(s) orally 2 times a day, As Needed -for constipation   digoxin 125 mcg (0.125 mg) oral tablet: 1 tab(s) orally once a day  DULoxetine 30 mg oral delayed release capsule: 3 cap(s) orally once a day  heparin 3mL flush from 5mL syringe: administer after each infusion  Labs - : CBC,CMP,ESR,CRP,Vanco trough once per week x 6 weeks. Start date: 08/10/2020 Send results to [place] Dr. Casillas at fax: [phone]  metoprolol succinate 25 mg oral tablet, extended release: 1 tab(s) orally once a day  NS FLUSH 10 mL: administer before and after each infusion  oxycodone-acetaminophen 5 mg-325 mg oral tablet: 1 tab(s) orally every 6 hours, As Needed -for severe pain MDD:4   pantoprazole 40 mg oral delayed release tablet: 1 tab(s) orally once a day, take 30 minutes before breakfast  prasugrel 10 mg oral tablet: 1 tab(s) orally once a day  spironolactone 25 mg oral tablet: 1 tab(s) orally once a day  tamsulosin 0.4 mg oral capsule: 1 cap(s) orally once a day (at bedtime)  vancomycin 1g in NS 250ml every 12 hours : Last date of administration:     diagnosis: T84.53XA  Zetia 10 mg oral tablet: 1 tab(s) orally once a day Abx - : Vancomycin 750mg every 12 hours. Last day of administration: 09/02/2020 Dx: MRSA infection right knee  acetaminophen 325 mg oral tablet: 2 tab(s) orally every 6 hours, As Needed - for pain or fever  aspirin 81 mg oral delayed release tablet: 1 tab(s) orally once a day  atorvastatin 40 mg oral tablet: 1 tab(s) orally once a day (at bedtime)  CBC, CMP, ESR, CRP, VANCO TROPH: 1x/ week for 6 weeks    start date: Monday 7/27/20    fax results to Dr. Casillas ([place]):  [phone]  Colace 100 mg oral capsule: 1 cap(s) orally 2 times a day, As Needed -for constipation   digoxin 125 mcg (0.125 mg) oral tablet: 1 tab(s) orally once a day  DULoxetine 30 mg oral delayed release capsule: 3 cap(s) orally once a day  heparin 3mL flush from 5mL syringe: administer after each infusion  Labs - : CBC,CMP,ESR,CRP,Vanco trough once per week x 6 weeks. Start date: 08/10/2020 Send results to [place] Dr. Casillas at fax: [phone]  metoprolol succinate 25 mg oral tablet, extended release: 1 tab(s) orally once a day  NS FLUSH 10 mL: administer before and after each infusion  oxycodone-acetaminophen 5 mg-325 mg oral tablet: 1 tab(s) orally every 6 hours, As Needed -for severe pain MDD:4   pantoprazole 40 mg oral delayed release tablet: 1 tab(s) orally once a day, take 30 minutes before breakfast  prasugrel 10 mg oral tablet: 1 tab(s) orally once a day  spironolactone 25 mg oral tablet: 1 tab(s) orally once a day  tamsulosin 0.4 mg oral capsule: 1 cap(s) orally once a day (at bedtime)  vancomycin 1g in NS 250ml every 12 hours : Last date of administration:     diagnosis: T84.53XA  Zetia 10 mg oral tablet: 1 tab(s) orally once a day Abx - : Vancomycin 750mg every 12 hours. Last day of administration: 09/02/2020 Dx: MRSA infection right knee  acetaminophen 325 mg oral tablet: 2 tab(s) orally every 6 hours, As Needed - for pain or fever  aspirin 81 mg oral delayed release tablet: 1 tab(s) orally once a day  atorvastatin 40 mg oral tablet: 1 tab(s) orally once a day (at bedtime)  CBC, CMP, ESR, CRP, VANCO TROPH: 1x/ week for 6 weeks    start date: Monday 7/27/20    fax results to Dr. Casillas ([place]):  [phone]  Colace 100 mg oral capsule: 1 cap(s) orally 2 times a day, As Needed -for constipation   digoxin 125 mcg (0.125 mg) oral tablet: 1 tab(s) orally once a day  DULoxetine 30 mg oral delayed release capsule: 3 cap(s) orally once a day  heparin 3mL flush from 5mL syringe: administer after each infusion  Labs - : CBC,CMP,ESR,CRP,Vanco trough once per week x 6 weeks. Start date: 08/10/2020 Send results to [place] Dr. Casillas at fax: [phone]  metoprolol succinate 25 mg oral tablet, extended release: 1 tab(s) orally once a day  NS FLUSH 10 mL: administer before and after each infusion  oxycodone-acetaminophen 5 mg-325 mg oral tablet: 1 tab(s) orally every 6 hours, As Needed -for severe pain MDD:4   pantoprazole 40 mg oral delayed release tablet: 1 tab(s) orally once a day, take 30 minutes before breakfast  prasugrel 10 mg oral tablet: 1 tab(s) orally once a day  spironolactone 25 mg oral tablet: 1 tab(s) orally once a day  tamsulosin 0.4 mg oral capsule: 1 cap(s) orally once a day (at bedtime)  vancomycin 1g in NS 250ml every 12 hours : Last date of administration:     diagnosis: T84.53XA  Zetia 10 mg oral tablet: 1 tab(s) orally once a day Abx - : Vancomycin 750mg every 12 hours. Last day of administration: 09/02/2020 Dx: MRSA infection right knee  acetaminophen 325 mg oral tablet: 2 tab(s) orally every 6 hours, As Needed - for pain or fever  aspirin 81 mg oral delayed release tablet: 1 tab(s) orally 2 times a day -for DVT prophylaxis THEN can restart home regimen for heart   atorvastatin 40 mg oral tablet: 1 tab(s) orally once a day (at bedtime)  CBC, CMP, ESR, CRP, VANCO TROPH: 1x/ week for 6 weeks    start date: Monday 7/27/20    fax results to Dr. Casillas ([place]):  [phone]  digoxin 125 mcg (0.125 mg) oral tablet: 1 tab(s) orally once a day  DULoxetine 30 mg oral delayed release capsule: 3 cap(s) orally once a day  heparin 3mL flush from 5mL syringe: administer after each infusion  Labs - : CBC,CMP,ESR,CRP,Vanco trough once per week x 6 weeks. Start date: 08/10/2020 Send results to [place] Dr. Casillas at fax: [phone]  metoprolol succinate 25 mg oral tablet, extended release: 1 tab(s) orally once a day  NS FLUSH 10 mL: administer before and after each infusion  oxycodone-acetaminophen 5 mg-325 mg oral tablet: 1 tab(s) orally every 6 hours, As Needed -for severe pain MDD:4   pantoprazole 40 mg oral delayed release tablet: 1 tab(s) orally once a day, take 30 minutes before breakfast  prasugrel 10 mg oral tablet: 1 tab(s) orally once a day  rifAMPin 300 mg oral capsule: 1 cap(s) orally every 12 hours MDD:2 PLEASE TAKE FOR TOTAL OF 6 WEEKS FROM SURGERY on 7/22  Silvadene 1% topical cream: 1 application topically 2 times a day to affected area  spironolactone 25 mg oral tablet: 1 tab(s) orally once a day  tamsulosin 0.4 mg oral capsule: 1 cap(s) orally once a day (at bedtime)  vancomycin 1g in NS 250ml every 12 hours : Last date of administration:     diagnosis: T84.53XA Abx - : Vancomycin 750mg every 12 hours. Last day of administration: 09/02/2020 Dx: MRSA infection right knee  acetaminophen 325 mg oral tablet: 3 tab(s) orally every 8 hours  aspirin 81 mg oral delayed release tablet: 1 tab(s) orally 2 times a day -for DVT prophylaxis THEN can restart home regimen for heart    atorvastatin 40 mg oral tablet: 1 tab(s) orally once a day (at bedtime)  CBC, CMP, ESR, CRP, VANCO TROPH: 1x/ week for 6 weeks    start date: Monday 7/27/20    fax results to Dr. Casillas ([place]):  [phone]  CeleBREX 200 mg oral capsule: 1 cap(s) orally 2 times a day   digoxin 125 mcg (0.125 mg) oral tablet: 1 tab(s) orally once a day  DULoxetine 30 mg oral delayed release capsule: 3 cap(s) orally once a day  heparin 3mL flush from 5mL syringe: administer after each infusion  HYDROmorphone 2 mg oral tablet: 1 tab(s) orally every 6 hours as needed for breakthrough pain MDD:4  Labs - : CBC,CMP,ESR,CRP,Vanco trough once per week x 6 weeks. Start date: 08/10/2020 Send results to [place] Dr. Casillas at fax: [phone]  metoprolol succinate 25 mg oral tablet, extended release: 1 tab(s) orally once a day  NS FLUSH 10 mL: administer before and after each infusion  oxyCODONE 10 mg oral tablet: 1 tab(s) orally every 4 to 6 hours, As Needed -Severe Pain (7 - 10) MDD:5  OxyCONTIN 10 mg oral tablet, extended release: 1 tab(s) orally every 12 hours MDD:2  pantoprazole 40 mg oral delayed release tablet: 1 tab(s) orally once a day, take 30 minutes before breakfast  prasugrel 10 mg oral tablet: 1 tab(s) orally once a day  rifAMPin 300 mg oral capsule: 1 cap(s) orally every 12 hours MDD:2 PLEASE TAKE FOR TOTAL OF 6 WEEKS FROM SURGERY on 7/22   Silvadene 1% topical cream: 1 application topically 2 times a day to affected area Right knee   spironolactone 25 mg oral tablet: 1 tab(s) orally once a day  tamsulosin 0.4 mg oral capsule: 1 cap(s) orally once a day (at bedtime)  vancomycin 1g in NS 250ml every 12 hours : Last date of administration:     diagnosis: T84.53XA

## 2020-07-21 NOTE — DISCHARGE NOTE NURSING/CASE MANAGEMENT/SOCIAL WORK - PATIENT PORTAL LINK FT
You can access the FollowMyHealth Patient Portal offered by [facility] by registering at the following website: [email or web address]. By joining "[employer]’s FollowMyHealth portal, you will also be able to view your health information using other applications (apps) compatible with our system.

## 2020-07-21 NOTE — DISCHARGE NOTE PROVIDER - NSDCFUSCHEDAPPT_GEN_ALL_CORE_FT
FLOWER MARISCAL ; 09/28/2020 ; NPP Cardio [address] [facility] FLOWER MARISCAL ; 09/28/2020 ; NPP Cardio [address] FLOWER MARISCAL ; 09/28/2020 ; NPP Cardio [address] [facility] FLOWER MARISCAL ; 09/28/2020 ; NPP Cardio [address] [facility] FLOWER MARISCAL ; 09/28/2020 ; NPP Cardio [address] [facility] FLOWER MARISCAL ; 09/28/2020 ; NPP Cardio [address] [facility] FLOWER MARISCAL ; 09/28/2020 ; NPP Cardio [address] [facility] FLOWER MARISCAL ; 09/28/2020 ; NPP Cardio [facility] FLOWER MARISCAL ; 09/28/2020 ; NPP Cardio [address] [facility] FLOWER MARISCAL ; 09/28/2020 ; NPP Cardio [address] [facility] FLOWER MARISCAL ; 09/28/2020 ; NPP Cardio [address] [facility] FLOWER MARISCAL ; 09/28/2020 ; NPP Cardio [address] [facility] FLOWER MARISCAL ; 09/28/2020 ; NPP Cardio [address] [facility] FLOWER MARISCAL ; 09/28/2020 ; NPP Cardio [address]10 [facility]

## 2020-07-21 NOTE — PROGRESS NOTE ADULT - SUBJECTIVE AND OBJECTIVE BOX
Ortho Note    Pt comfortable without complaints, pain controlled  Denies CP, SOB, N/V, numbness/tingling     Vital Signs Last 24 Hrs  T(C): 37.1 (07-21-20 @ 09:15), Max: 37.1 (07-21-20 @ 09:15)  T(F): 98.8 (07-21-20 @ 09:15), Max: 98.8 (07-21-20 @ 09:15)  HR: 90 (07-21-20 @ 09:15) (90 - 90)  BP: 110/74 (07-21-20 @ 09:15) (110/74 - 110/74)  BP(mean): --  RR: 18 (07-21-20 @ 09:15) (18 - 18)  SpO2: 96% (07-21-20 @ 09:15) (96% - 96%)  AVSS    focused exam:  General: Pt Alert and oriented, NAD  R knee with 2 sutures LEANDER, mild swelling/ erythema; hemovac x1 with purulent drainage  Pulses: +2DP, WWP feet  Sensation: SILT to baseline (h/o b/l neuropathy)  Motor: 5/5 EHL/FHL/TA/GS BLE                          10.8   11.82 )-----------( 332      ( 21 Jul 2020 07:41 )             35.6   21 Jul 2020 07:41    136    |  100    |  16     ----------------------------<  208    4.0     |  25     |  0.60     Ca    8.7        21 Jul 2020 07:41    TPro  6.1    /  Alb  2.7    /  TBili  0.7    /  DBili  x      /  AST  16     /  ALT  28     /  AlkPhos  104    21 Jul 2020 07:41      A/P: 65yMale POD#4 s/p right knee arthroscopic I+D  - VSS, continue to f/u labs  - digoxin subtherapeutic likely due to being noncompliant with medication; continue home dosage and spoke with patient about needing to take medication; appreciate cardiology recs  - for JOHN PAUL today to r/o infection in pacemaker leads   -LLE vascular ulcer- collagenase and treatment as per vascular  - continue hemovac until dry; will likely need to go home with hemovac + VNS  - appreciate ID recs- continue vanc and rifampin; will need vanc troph prior to 4th dose at 1g tomorrow AM; if cultures still NG tomorrow, can insert PICC tomorrow  - Pain Control  - DVT ppx: ASA + effient  - PT, WBS: WBAT  - OOB for meals, I/S  - dispo: home with PICC for IV abx/ home infusion services/ VNS for hemovac pending BC still negative tomorrow and medical/ cardiology clearance for dc    Ortho Pager [phone]

## 2020-07-21 NOTE — DISCHARGE NOTE PROVIDER - CARE PROVIDERS DIRECT ADDRESSES
,DirectAddress_Unknown,[email or web address] ,[email or web address],[email or web address],DirectAddress_Unknown

## 2020-07-21 NOTE — DISCHARGE NOTE NURSING/CASE MANAGEMENT/SOCIAL WORK - NSSCTYPOFSERV_GEN_ALL_CORE
HOME IV INFUSION AND HOME PHYSICAL  THERAPY Home IV antibiotics, Lab Draws/Home Physical Therapy, Aide evaluation

## 2020-07-22 LAB
ALBUMIN SERPL ELPH-MCNC: 3 G/DL — LOW (ref 3.3–5)
ALP SERPL-CCNC: 114 U/L — SIGNIFICANT CHANGE UP (ref 40–120)
ALT FLD-CCNC: 22 U/L — SIGNIFICANT CHANGE UP (ref 10–45)
ANION GAP SERPL CALC-SCNC: 10 MMOL/L — SIGNIFICANT CHANGE UP (ref 5–17)
ANION GAP SERPL CALC-SCNC: 10 MMOL/L — SIGNIFICANT CHANGE UP (ref 5–17)
AST SERPL-CCNC: 13 U/L — SIGNIFICANT CHANGE UP (ref 10–40)
BASE EXCESS BLDA CALC-SCNC: -1.2 MMOL/L — SIGNIFICANT CHANGE UP (ref -2–3)
BILIRUB SERPL-MCNC: 0.9 MG/DL — SIGNIFICANT CHANGE UP (ref 0.2–1.2)
BUN SERPL-MCNC: 16 MG/DL — SIGNIFICANT CHANGE UP (ref 7–23)
BUN SERPL-MCNC: 17 MG/DL — SIGNIFICANT CHANGE UP (ref 7–23)
CALCIUM SERPL-MCNC: 8 MG/DL — LOW (ref 8.4–10.5)
CALCIUM SERPL-MCNC: 8.7 MG/DL — SIGNIFICANT CHANGE UP (ref 8.4–10.5)
CHLORIDE SERPL-SCNC: 101 MMOL/L — SIGNIFICANT CHANGE UP (ref 96–108)
CHLORIDE SERPL-SCNC: 98 MMOL/L — SIGNIFICANT CHANGE UP (ref 96–108)
CO2 SERPL-SCNC: 25 MMOL/L — SIGNIFICANT CHANGE UP (ref 22–31)
CO2 SERPL-SCNC: 27 MMOL/L — SIGNIFICANT CHANGE UP (ref 22–31)
CREAT SERPL-MCNC: 0.76 MG/DL — SIGNIFICANT CHANGE UP (ref 0.5–1.3)
CREAT SERPL-MCNC: 0.85 MG/DL — SIGNIFICANT CHANGE UP (ref 0.5–1.3)
CRP SERPL-MCNC: 10.83 MG/DL — HIGH (ref 0–0.4)
ERYTHROCYTE [SEDIMENTATION RATE] IN BLOOD: 59 MM/HR — HIGH
GLUCOSE BLDC GLUCOMTR-MCNC: 219 MG/DL — HIGH (ref 70–99)
GLUCOSE BLDC GLUCOMTR-MCNC: 224 MG/DL — HIGH (ref 70–99)
GLUCOSE BLDC GLUCOMTR-MCNC: 258 MG/DL — HIGH (ref 70–99)
GLUCOSE SERPL-MCNC: 267 MG/DL — HIGH (ref 70–99)
GLUCOSE SERPL-MCNC: 275 MG/DL — HIGH (ref 70–99)
GRAM STN FLD: SIGNIFICANT CHANGE UP
HCO3 BLDA-SCNC: 24 MMOL/L — SIGNIFICANT CHANGE UP (ref 21–28)
HCT VFR BLD CALC: 27.9 % — LOW (ref 39–50)
HCT VFR BLD CALC: 34.3 % — LOW (ref 39–50)
HGB BLD-MCNC: 10.5 G/DL — LOW (ref 13–17)
HGB BLD-MCNC: 8.7 G/DL — LOW (ref 13–17)
LACTATE SERPL-SCNC: 0.9 MMOL/L — SIGNIFICANT CHANGE UP (ref 0.5–2)
MAGNESIUM SERPL-MCNC: 1.6 MG/DL — SIGNIFICANT CHANGE UP (ref 1.6–2.6)
MCHC RBC-ENTMCNC: 24 PG — LOW (ref 27–34)
MCHC RBC-ENTMCNC: 24.2 PG — LOW (ref 27–34)
MCHC RBC-ENTMCNC: 30.6 GM/DL — LOW (ref 32–36)
MCHC RBC-ENTMCNC: 31.2 GM/DL — LOW (ref 32–36)
MCV RBC AUTO: 77.7 FL — LOW (ref 80–100)
MCV RBC AUTO: 78.5 FL — LOW (ref 80–100)
NRBC # BLD: 0 /100 WBCS — SIGNIFICANT CHANGE UP (ref 0–0)
NRBC # BLD: 0 /100 WBCS — SIGNIFICANT CHANGE UP (ref 0–0)
PCO2 BLDA: 43 MMHG — SIGNIFICANT CHANGE UP (ref 35–48)
PH BLDA: 7.37 — SIGNIFICANT CHANGE UP (ref 7.35–7.45)
PHOSPHATE SERPL-MCNC: 3.2 MG/DL — SIGNIFICANT CHANGE UP (ref 2.5–4.5)
PLATELET # BLD AUTO: 350 K/UL — SIGNIFICANT CHANGE UP (ref 150–400)
PLATELET # BLD AUTO: 361 K/UL — SIGNIFICANT CHANGE UP (ref 150–400)
PO2 BLDA: 70 MMHG — LOW (ref 83–108)
POTASSIUM SERPL-MCNC: 4.4 MMOL/L — SIGNIFICANT CHANGE UP (ref 3.5–5.3)
POTASSIUM SERPL-MCNC: 4.4 MMOL/L — SIGNIFICANT CHANGE UP (ref 3.5–5.3)
POTASSIUM SERPL-SCNC: 4.4 MMOL/L — SIGNIFICANT CHANGE UP (ref 3.5–5.3)
POTASSIUM SERPL-SCNC: 4.4 MMOL/L — SIGNIFICANT CHANGE UP (ref 3.5–5.3)
PROT SERPL-MCNC: 6.3 G/DL — SIGNIFICANT CHANGE UP (ref 6–8.3)
RBC # BLD: 3.59 M/UL — LOW (ref 4.2–5.8)
RBC # BLD: 4.37 M/UL — SIGNIFICANT CHANGE UP (ref 4.2–5.8)
RBC # FLD: 17.7 % — HIGH (ref 10.3–14.5)
RBC # FLD: 18 % — HIGH (ref 10.3–14.5)
SAO2 % BLDA: 92 % — LOW (ref 95–100)
SODIUM SERPL-SCNC: 135 MMOL/L — SIGNIFICANT CHANGE UP (ref 135–145)
SODIUM SERPL-SCNC: 136 MMOL/L — SIGNIFICANT CHANGE UP (ref 135–145)
SPECIMEN SOURCE: SIGNIFICANT CHANGE UP
VANCOMYCIN TROUGH SERPL-MCNC: 14.5 UG/ML — SIGNIFICANT CHANGE UP (ref 10–20)
WBC # BLD: 11.55 K/UL — HIGH (ref 3.8–10.5)
WBC # BLD: 18.45 K/UL — HIGH (ref 3.8–10.5)
WBC # FLD AUTO: 11.55 K/UL — HIGH (ref 3.8–10.5)
WBC # FLD AUTO: 18.45 K/UL — HIGH (ref 3.8–10.5)

## 2020-07-22 PROCEDURE — 99232 SBSQ HOSP IP/OBS MODERATE 35: CPT

## 2020-07-22 PROCEDURE — 99233 SBSQ HOSP IP/OBS HIGH 50: CPT | Mod: GC

## 2020-07-22 PROCEDURE — 99233 SBSQ HOSP IP/OBS HIGH 50: CPT

## 2020-07-22 PROCEDURE — 73560 X-RAY EXAM OF KNEE 1 OR 2: CPT | Mod: 26,RT

## 2020-07-22 PROCEDURE — 27488 REMOVAL OF KNEE PROSTHESIS: CPT | Mod: 78,RT

## 2020-07-22 PROCEDURE — 99232 SBSQ HOSP IP/OBS MODERATE 35: CPT | Mod: GC

## 2020-07-22 PROCEDURE — 11981 INSERTION DRUG DLVR IMPLANT: CPT | Mod: 78,RT

## 2020-07-22 RX ORDER — OXYCODONE HYDROCHLORIDE 5 MG/1
10 TABLET ORAL EVERY 4 HOURS
Refills: 0 | Status: DISCONTINUED | OUTPATIENT
Start: 2020-07-22 | End: 2020-07-25

## 2020-07-22 RX ORDER — CHLORHEXIDINE GLUCONATE 213 G/1000ML
1 SOLUTION TOPICAL DAILY
Refills: 0 | Status: DISCONTINUED | OUTPATIENT
Start: 2020-07-22 | End: 2020-07-27

## 2020-07-22 RX ORDER — MAGNESIUM HYDROXIDE 400 MG/1
30 TABLET, CHEWABLE ORAL DAILY
Refills: 0 | Status: DISCONTINUED | OUTPATIENT
Start: 2020-07-22 | End: 2020-08-10

## 2020-07-22 RX ORDER — TAMSULOSIN HYDROCHLORIDE 0.4 MG/1
0.4 CAPSULE ORAL AT BEDTIME
Refills: 0 | Status: DISCONTINUED | OUTPATIENT
Start: 2020-07-22 | End: 2020-08-10

## 2020-07-22 RX ORDER — POLYETHYLENE GLYCOL 3350 17 G/17G
17 POWDER, FOR SOLUTION ORAL DAILY
Refills: 0 | Status: DISCONTINUED | OUTPATIENT
Start: 2020-07-22 | End: 2020-08-10

## 2020-07-22 RX ORDER — SODIUM CHLORIDE 9 MG/ML
1000 INJECTION, SOLUTION INTRAVENOUS
Refills: 0 | Status: DISCONTINUED | OUTPATIENT
Start: 2020-07-22 | End: 2020-07-25

## 2020-07-22 RX ORDER — SENNA PLUS 8.6 MG/1
2 TABLET ORAL AT BEDTIME
Refills: 0 | Status: DISCONTINUED | OUTPATIENT
Start: 2020-07-22 | End: 2020-07-22

## 2020-07-22 RX ORDER — HEPARIN SODIUM 5000 [USP'U]/ML
5000 INJECTION INTRAVENOUS; SUBCUTANEOUS EVERY 8 HOURS
Refills: 0 | Status: DISCONTINUED | OUTPATIENT
Start: 2020-07-22 | End: 2020-07-29

## 2020-07-22 RX ORDER — VANCOMYCIN HCL 1 G
1000 VIAL (EA) INTRAVENOUS EVERY 12 HOURS
Refills: 0 | Status: DISCONTINUED | OUTPATIENT
Start: 2020-07-23 | End: 2020-07-28

## 2020-07-22 RX ORDER — ATORVASTATIN CALCIUM 80 MG/1
40 TABLET, FILM COATED ORAL AT BEDTIME
Refills: 0 | Status: DISCONTINUED | OUTPATIENT
Start: 2020-07-22 | End: 2020-08-10

## 2020-07-22 RX ORDER — DEXTROSE 50 % IN WATER 50 %
25 SYRINGE (ML) INTRAVENOUS ONCE
Refills: 0 | Status: DISCONTINUED | OUTPATIENT
Start: 2020-07-22 | End: 2020-08-10

## 2020-07-22 RX ORDER — DIGOXIN 250 MCG
0.12 TABLET ORAL DAILY
Refills: 0 | Status: DISCONTINUED | OUTPATIENT
Start: 2020-07-22 | End: 2020-08-10

## 2020-07-22 RX ORDER — DULOXETINE HYDROCHLORIDE 30 MG/1
90 CAPSULE, DELAYED RELEASE ORAL DAILY
Refills: 0 | Status: DISCONTINUED | OUTPATIENT
Start: 2020-07-22 | End: 2020-08-10

## 2020-07-22 RX ORDER — PRASUGREL 5 MG/1
10 TABLET, FILM COATED ORAL DAILY
Refills: 0 | Status: DISCONTINUED | OUTPATIENT
Start: 2020-07-23 | End: 2020-08-10

## 2020-07-22 RX ORDER — GLUCAGON INJECTION, SOLUTION 0.5 MG/.1ML
1 INJECTION, SOLUTION SUBCUTANEOUS ONCE
Refills: 0 | Status: DISCONTINUED | OUTPATIENT
Start: 2020-07-22 | End: 2020-08-10

## 2020-07-22 RX ORDER — BUPIVACAINE 13.3 MG/ML
20 INJECTION, SUSPENSION, LIPOSOMAL INFILTRATION ONCE
Refills: 0 | Status: DISCONTINUED | OUTPATIENT
Start: 2020-07-22 | End: 2020-07-22

## 2020-07-22 RX ORDER — INSULIN LISPRO 100/ML
4 VIAL (ML) SUBCUTANEOUS
Refills: 0 | Status: DISCONTINUED | OUTPATIENT
Start: 2020-07-22 | End: 2020-07-23

## 2020-07-22 RX ORDER — ASPIRIN/CALCIUM CARB/MAGNESIUM 324 MG
81 TABLET ORAL
Refills: 0 | Status: DISCONTINUED | OUTPATIENT
Start: 2020-07-23 | End: 2020-07-30

## 2020-07-22 RX ORDER — POLYETHYLENE GLYCOL 3350 17 G/17G
17 POWDER, FOR SOLUTION ORAL DAILY
Refills: 0 | Status: DISCONTINUED | OUTPATIENT
Start: 2020-07-22 | End: 2020-07-22

## 2020-07-22 RX ORDER — CHLORHEXIDINE GLUCONATE 213 G/1000ML
1 SOLUTION TOPICAL DAILY
Refills: 0 | Status: DISCONTINUED | OUTPATIENT
Start: 2020-07-22 | End: 2020-07-22

## 2020-07-22 RX ORDER — INSULIN LISPRO 100/ML
VIAL (ML) SUBCUTANEOUS
Refills: 0 | Status: DISCONTINUED | OUTPATIENT
Start: 2020-07-22 | End: 2020-07-22

## 2020-07-22 RX ORDER — OXYCODONE HYDROCHLORIDE 5 MG/1
5 TABLET ORAL EVERY 4 HOURS
Refills: 0 | Status: DISCONTINUED | OUTPATIENT
Start: 2020-07-22 | End: 2020-07-25

## 2020-07-22 RX ORDER — DEXTROSE 50 % IN WATER 50 %
12.5 SYRINGE (ML) INTRAVENOUS ONCE
Refills: 0 | Status: DISCONTINUED | OUTPATIENT
Start: 2020-07-22 | End: 2020-08-10

## 2020-07-22 RX ORDER — ACETAMINOPHEN 500 MG
975 TABLET ORAL EVERY 8 HOURS
Refills: 0 | Status: DISCONTINUED | OUTPATIENT
Start: 2020-07-22 | End: 2020-07-25

## 2020-07-22 RX ORDER — COLLAGENASE CLOSTRIDIUM HIST. 250 UNIT/G
1 OINTMENT (GRAM) TOPICAL DAILY
Refills: 0 | Status: DISCONTINUED | OUTPATIENT
Start: 2020-07-22 | End: 2020-07-23

## 2020-07-22 RX ORDER — PANTOPRAZOLE SODIUM 20 MG/1
40 TABLET, DELAYED RELEASE ORAL
Refills: 0 | Status: DISCONTINUED | OUTPATIENT
Start: 2020-07-22 | End: 2020-08-10

## 2020-07-22 RX ORDER — ONDANSETRON 8 MG/1
4 TABLET, FILM COATED ORAL EVERY 6 HOURS
Refills: 0 | Status: DISCONTINUED | OUTPATIENT
Start: 2020-07-22 | End: 2020-08-10

## 2020-07-22 RX ORDER — BACITRACIN ZINC 500 UNIT/G
1 OINTMENT IN PACKET (EA) TOPICAL DAILY
Refills: 0 | Status: DISCONTINUED | OUTPATIENT
Start: 2020-07-22 | End: 2020-07-23

## 2020-07-22 RX ORDER — ONDANSETRON 8 MG/1
4 TABLET, FILM COATED ORAL EVERY 6 HOURS
Refills: 0 | Status: DISCONTINUED | OUTPATIENT
Start: 2020-07-22 | End: 2020-07-22

## 2020-07-22 RX ORDER — SPIRONOLACTONE 25 MG/1
25 TABLET, FILM COATED ORAL DAILY
Refills: 0 | Status: DISCONTINUED | OUTPATIENT
Start: 2020-07-22 | End: 2020-08-10

## 2020-07-22 RX ORDER — SODIUM CHLORIDE 9 MG/ML
1000 INJECTION, SOLUTION INTRAVENOUS
Refills: 0 | Status: DISCONTINUED | OUTPATIENT
Start: 2020-07-22 | End: 2020-08-10

## 2020-07-22 RX ORDER — HYDROMORPHONE HYDROCHLORIDE 2 MG/ML
0.5 INJECTION INTRAMUSCULAR; INTRAVENOUS; SUBCUTANEOUS
Refills: 0 | Status: DISCONTINUED | OUTPATIENT
Start: 2020-07-22 | End: 2020-07-23

## 2020-07-22 RX ORDER — METOPROLOL TARTRATE 50 MG
25 TABLET ORAL DAILY
Refills: 0 | Status: DISCONTINUED | OUTPATIENT
Start: 2020-07-22 | End: 2020-08-10

## 2020-07-22 RX ORDER — INSULIN GLARGINE 100 [IU]/ML
12 INJECTION, SOLUTION SUBCUTANEOUS AT BEDTIME
Refills: 0 | Status: DISCONTINUED | OUTPATIENT
Start: 2020-07-22 | End: 2020-07-24

## 2020-07-22 RX ORDER — SENNA PLUS 8.6 MG/1
2 TABLET ORAL AT BEDTIME
Refills: 0 | Status: DISCONTINUED | OUTPATIENT
Start: 2020-07-22 | End: 2020-08-10

## 2020-07-22 RX ORDER — INSULIN LISPRO 100/ML
VIAL (ML) SUBCUTANEOUS AT BEDTIME
Refills: 0 | Status: DISCONTINUED | OUTPATIENT
Start: 2020-07-22 | End: 2020-07-23

## 2020-07-22 RX ORDER — DEXTROSE 50 % IN WATER 50 %
15 SYRINGE (ML) INTRAVENOUS ONCE
Refills: 0 | Status: DISCONTINUED | OUTPATIENT
Start: 2020-07-22 | End: 2020-08-10

## 2020-07-22 RX ADMIN — OXYCODONE HYDROCHLORIDE 5 MILLIGRAM(S): 5 TABLET ORAL at 02:49

## 2020-07-22 RX ADMIN — Medication 2: at 22:16

## 2020-07-22 RX ADMIN — NYSTATIN CREAM 1 APPLICATION(S): 100000 CREAM TOPICAL at 05:37

## 2020-07-22 RX ADMIN — Medication 81 MILLIGRAM(S): at 05:37

## 2020-07-22 RX ADMIN — Medication 250 MILLIGRAM(S): at 00:01

## 2020-07-22 RX ADMIN — TAMSULOSIN HYDROCHLORIDE 0.4 MILLIGRAM(S): 0.4 CAPSULE ORAL at 21:59

## 2020-07-22 RX ADMIN — Medication 1 APPLICATION(S): at 11:30

## 2020-07-22 RX ADMIN — Medication 250 MILLIGRAM(S): at 12:46

## 2020-07-22 RX ADMIN — HEPARIN SODIUM 5000 UNIT(S): 5000 INJECTION INTRAVENOUS; SUBCUTANEOUS at 22:18

## 2020-07-22 RX ADMIN — CHLORHEXIDINE GLUCONATE 1 APPLICATION(S): 213 SOLUTION TOPICAL at 12:46

## 2020-07-22 RX ADMIN — INSULIN GLARGINE 12 UNIT(S): 100 INJECTION, SOLUTION SUBCUTANEOUS at 22:00

## 2020-07-22 RX ADMIN — Medication 25 MILLIGRAM(S): at 05:37

## 2020-07-22 RX ADMIN — Medication 4: at 11:42

## 2020-07-22 RX ADMIN — PANTOPRAZOLE SODIUM 40 MILLIGRAM(S): 20 TABLET, DELAYED RELEASE ORAL at 07:32

## 2020-07-22 RX ADMIN — DULOXETINE HYDROCHLORIDE 90 MILLIGRAM(S): 30 CAPSULE, DELAYED RELEASE ORAL at 11:30

## 2020-07-22 RX ADMIN — ATORVASTATIN CALCIUM 40 MILLIGRAM(S): 80 TABLET, FILM COATED ORAL at 21:59

## 2020-07-22 RX ADMIN — Medication 4: at 07:31

## 2020-07-22 NOTE — PROGRESS NOTE ADULT - SUBJECTIVE AND OBJECTIVE BOX
INTERVAL HISTORY:  No chest pain or dyspnea	  Chart, [facility] medications and allergies reviewed    MEDICATIONS:  MEDICATIONS  (STANDING):  aspirin enteric coated 81 milliGRAM(s) Oral two times a day  atorvastatin 40 milliGRAM(s) Oral at bedtime  BACItracin   Ointment 1 Application(s) Topical daily  chlorhexidine 2% Cloths 1 Application(s) Topical daily  collagenase Ointment 1 Application(s) Topical daily  dextrose 5%. 1000 milliLiter(s) (50 mL/Hr) IV Continuous <Continuous>  dextrose 50% Injectable 12.5 Gram(s) IV Push once  dextrose 50% Injectable 25 Gram(s) IV Push once  dextrose 50% Injectable 25 Gram(s) IV Push once  digoxin     Tablet 0.125 milliGRAM(s) Oral daily  DULoxetine 90 milliGRAM(s) Oral daily  insulin glargine Injectable (LANTUS) 12 Unit(s) SubCutaneous at bedtime  insulin lispro (HumaLOG) corrective regimen sliding scale   SubCutaneous Before meals and at bedtime  insulin lispro Injectable (HumaLOG) 4 Unit(s) SubCutaneous three times a day before meals  lactated ringers. 1000 milliLiter(s) (50 mL/Hr) IV Continuous <Continuous>  metoprolol succinate ER 25 milliGRAM(s) Oral daily  nystatin Powder 1 Application(s) Topical two times a day  pantoprazole    Tablet 40 milliGRAM(s) Oral before breakfast  polyethylene glycol 3350 17 Gram(s) Oral daily  prasugrel 10 milliGRAM(s) Oral daily  rifAMPin 300 milliGRAM(s) Oral every 12 hours  tamsulosin 0.4 milliGRAM(s) Oral at bedtime  vancomycin  IVPB 1000 milliGRAM(s) IV Intermittent every 12 hours      REVIEW OF SYSTEMS:  CONSTITUTIONAL: No fever, no weight loss, no fatigue  PULMONARY: No cough, no wheezing, chills no hemoptysis; No Shortness of Breath  CARDIOVASCULAR: No chest pain, no palpitations, no syncope, dizziness, no leg swelling  GASTROINTESTINAL: No abdominal pain. No nausea, no  vomiting, no hematemesis; No diarrhea, no constipation. No melena, no hematochezia.  GENITOURINARY: No dysuria, no frequency, no hematuria, no incontinence  SKIN: No itching, no burning, no rashes, no lesions     PHYSICAL EXAM:  T(C): 36.8 (07-22-20 @ 09:03), Max: 37.2 (07-22-20 @ 00:11)  HR: 76 (07-22-20 @ 09:03) (71 - 95)  BP: 101/57 (07-22-20 @ 09:03) (101/57 - 137/84)  RR: 19 (07-22-20 @ 09:03) (17 - 19)  SpO2: 99% (07-22-20 @ 09:03) (95% - 99%)  Wt(kg): --  I&O's Summary    21 Jul 2020 07:01  -  22 Jul 2020 07:00  --------------------------------------------------------  IN: 500 mL / OUT: 2405 mL / NET: -1905 mL        Appearance:  No deformities, normal appearance	  HEENT:   Normal oral mucosa, PERRL, EOMI	  Neck: No jvd , no masses  Lymphatic: No  lymphadenopathy  Pulmonary: Lungs clear to auscultation and percussion  Cardiovascular: Normal S1 S2, No JVD, No murmur, No edema	  Abdomen:  Soft, Non-tender, + BS  Skin: No rashes, No ecchymoses	  Extremities:  No clubbing or cyanosis  MSK: Normal range of motion, no joint swelling    LABS:		  CARDIAC MARKERS:                         10.5   11.55 )-----------( 361      ( 22 Jul 2020 06:13 )             34.3   07-22    135  |  98  |  16  ----------------------------<  275<H>  4.4   |  27  |  0.85    Ca    8.7      22 Jul 2020 06:13    TPro  6.3  /  Alb  3.0<L>  /  TBili  0.9  /  DBili  x   /  AST  13  /  ALT  22  /  AlkPhos  114  07-22    proBNP:  Lipid Profile:  HgA1c:  TSH:      Culture - Blood (collected 07-19-20 @ 15:11)  Source: .Blood Blood-Peripheral  Preliminary Report (07-21-20 @ 16:00):    No growth at 2 days.    Culture - Blood (collected 07-19-20 @ 15:11)  Source: .Blood Blood-Peripheral  Preliminary Report (07-21-20 @ 16:00):    No growth at 2 days.    Culture - Acid Fast - Tissue w/Smear (collected 07-17-20 @ 22:23)  Source: .Tissue Right Synovium #2    Culture - Fungal, Tissue (collected 07-17-20 @ 22:23)  Source: .Tissue Right Synovium #2  Preliminary Report (07-18-20 @ 15:21):    Testing in progress    Culture - Acid Fast - Body Fluid w/Smear (collected 07-17-20 @ 22:21)  Source: .Body Fluid Right Synovium #1, conical tube    Culture - Fungal, Body Fluid (collected 07-17-20 @ 22:21)  Source: .Body Fluid Right Synovium #1, conical tube  Preliminary Report (07-18-20 @ 15:12):    Testing in progress    Culture - Body Fluid with Gram Stain (collected 07-17-20 @ 14:37)  Source: .Body Fluid Right Synovium #1  Gram Stain (07-18-20 @ 11:42):    Few Gram Positive Cocci in Clusters    Numerous White blood cells  Final Report (07-19-20 @ 09:50):    Few Methicillin resistant Staphylococcus aureus    Floor previously notified.  Organism: Methicillin resistant Staphylococcus aureus  Methicillin resistant Staphylococcus aureus (07-19-20 @ 09:50)  Organism: Methicillin resistant Staphylococcus aureus (07-19-20 @ 09:50)      -  Cefazolin: R 8      -  Clindamycin: S <=0.25      -  Daptomycin: S 0.5      -  Erythromycin: R >4      -  Linezolid: S 2      -  Oxacillin: R >2      -  RIF- Rifampin: S <=1 Should not be used as monotherapy      -  Tetra/Doxy: S <=1      -  Trimethoprim/Sulfamethoxazole: R >2/38      Method Type: YOLIE  Organism: Methicillin resistant Staphylococcus aureus (07-19-20 @ 09:50)      -  Vancomycin: S 1.5      Method Type: ETEST    Culture - Tissue with Gram Stain (collected 07-17-20 @ 14:35)  Source: .Tissue Right Synovium #2  Gram Stain (07-17-20 @ 17:37):    No organisms seen    Rare WBC's  Final Report (07-19-20 @ 09:44):    Rare Methicillin resistant Staphylococcus aureus    Floor previously notified.  Organism: Methicillin resistant Staphylococcus aureus  Methicillin resistant Staphylococcus aureus (07-19-20 @ 09:44)  Organism: Methicillin resistant Staphylococcus aureus (07-19-20 @ 09:44)      -  Vancomycin: S 1.5      Method Type: ETEST  Organism: Methicillin resistant Staphylococcus aureus (07-19-20 @ 09:44)      -  Cefazolin: R >16      -  Clindamycin: S <=0.25      -  Daptomycin: S 0.5      -  Erythromycin: R >4      -  Linezolid: S 2      -  Oxacillin: R >2      -  RIF- Rifampin: S <=1 Should not be used as monotherapy      -  Tetra/Doxy: S <=1      -  Trimethoprim/Sulfamethoxazole: R >2/38      Method Type: YOLIE      TELEMETRY: 	      QTC     ECG:  	   QTC         RADIOLOGY:   DIAGNOSTIC TESTING: [ ] Echocardiogram: [ ]  Catheterization: [ ] Stress Test:      ASSESSMENT/PLAN: 	        Discussed with resident. INTERVAL HISTORY:  No chest pain or dyspnea	  Chart, [facility] medications and allergies reviewed    MEDICATIONS:  MEDICATIONS  (STANDING):  aspirin enteric coated 81 milliGRAM(s) Oral two times a day  atorvastatin 40 milliGRAM(s) Oral at bedtime  BACItracin   Ointment 1 Application(s) Topical daily  chlorhexidine 2% Cloths 1 Application(s) Topical daily  collagenase Ointment 1 Application(s) Topical daily  dextrose 5%. 1000 milliLiter(s) (50 mL/Hr) IV Continuous <Continuous>  dextrose 50% Injectable 12.5 Gram(s) IV Push once  dextrose 50% Injectable 25 Gram(s) IV Push once  dextrose 50% Injectable 25 Gram(s) IV Push once  digoxin     Tablet 0.125 milliGRAM(s) Oral daily  DULoxetine 90 milliGRAM(s) Oral daily  insulin glargine Injectable (LANTUS) 12 Unit(s) SubCutaneous at bedtime  insulin lispro (HumaLOG) corrective regimen sliding scale   SubCutaneous Before meals and at bedtime  insulin lispro Injectable (HumaLOG) 4 Unit(s) SubCutaneous three times a day before meals  lactated ringers. 1000 milliLiter(s) (50 mL/Hr) IV Continuous <Continuous>  metoprolol succinate ER 25 milliGRAM(s) Oral daily  nystatin Powder 1 Application(s) Topical two times a day  pantoprazole    Tablet 40 milliGRAM(s) Oral before breakfast  polyethylene glycol 3350 17 Gram(s) Oral daily  prasugrel 10 milliGRAM(s) Oral daily  rifAMPin 300 milliGRAM(s) Oral every 12 hours  tamsulosin 0.4 milliGRAM(s) Oral at bedtime  vancomycin  IVPB 1000 milliGRAM(s) IV Intermittent every 12 hours      REVIEW OF SYSTEMS:  CONSTITUTIONAL: No fever, no weight loss, no fatigue  PULMONARY: No cough, no wheezing, chills no hemoptysis; No Shortness of Breath  CARDIOVASCULAR: No chest pain, no palpitations, no syncope, dizziness, no leg swelling  GASTROINTESTINAL: No abdominal pain. No nausea, no  vomiting, no hematemesis; No diarrhea, no constipation. No melena, no hematochezia.  GENITOURINARY: No dysuria, no frequency, no hematuria, no incontinence  SKIN: No itching, no burning, no rashes, no lesions     PHYSICAL EXAM:  T(C): 36.8 (07-22-20 @ 09:03), Max: 37.2 (07-22-20 @ 00:11)  HR: 76 (07-22-20 @ 09:03) (71 - 95)  BP: 101/57 (07-22-20 @ 09:03) (101/57 - 137/84)  RR: 19 (07-22-20 @ 09:03) (17 - 19)  SpO2: 99% (07-22-20 @ 09:03) (95% - 99%)  Wt(kg): --  I&O's Summary    21 Jul 2020 07:01  -  22 Jul 2020 07:00  --------------------------------------------------------  IN: 500 mL / OUT: 2405 mL / NET: -1905 mL        Appearance:  No deformities, normal appearance	  HEENT:   Normal oral mucosa, PERRL, EOMI	  Neck: No jvd , no masses  Lymphatic: No  lymphadenopathy  Pulmonary: Lungs clear to auscultation and percussion  Cardiovascular: Normal S1 S2, No JVD, No murmur, No edema	  Abdomen:  Soft, Non-tender, + BS  Skin: No rashes, No ecchymoses	  Extremities:  No clubbing or cyanosis  MSK: Normal range of motion, no joint swelling    LABS:		  CARDIAC MARKERS:                         10.5   11.55 )-----------( 361      ( 22 Jul 2020 06:13 )             34.3   07-22    135  |  98  |  16  ----------------------------<  275<H>  4.4   |  27  |  0.85    Ca    8.7      22 Jul 2020 06:13    TPro  6.3  /  Alb  3.0<L>  /  TBili  0.9  /  DBili  x   /  AST  13  /  ALT  22  /  AlkPhos  114  07-22    proBNP:  Lipid Profile:  HgA1c:  TSH:      Culture - Blood (collected 07-19-20 @ 15:11)  Source: .Blood Blood-Peripheral  Preliminary Report (07-21-20 @ 16:00):    No growth at 2 days.    Culture - Blood (collected 07-19-20 @ 15:11)  Source: .Blood Blood-Peripheral  Preliminary Report (07-21-20 @ 16:00):    No growth at 2 days.    Culture - Acid Fast - Tissue w/Smear (collected 07-17-20 @ 22:23)  Source: .Tissue Right Synovium #2    Culture - Fungal, Tissue (collected 07-17-20 @ 22:23)  Source: .Tissue Right Synovium #2  Preliminary Report (07-18-20 @ 15:21):    Testing in progress    Culture - Acid Fast - Body Fluid w/Smear (collected 07-17-20 @ 22:21)  Source: .Body Fluid Right Synovium #1, conical tube    Culture - Fungal, Body Fluid (collected 07-17-20 @ 22:21)  Source: .Body Fluid Right Synovium #1, conical tube  Preliminary Report (07-18-20 @ 15:12):    Testing in progress    Culture - Body Fluid with Gram Stain (collected 07-17-20 @ 14:37)  Source: .Body Fluid Right Synovium #1  Gram Stain (07-18-20 @ 11:42):    Few Gram Positive Cocci in Clusters    Numerous White blood cells  Final Report (07-19-20 @ 09:50):    Few Methicillin resistant Staphylococcus aureus    Floor previously notified.  Organism: Methicillin resistant Staphylococcus aureus  Methicillin resistant Staphylococcus aureus (07-19-20 @ 09:50)  Organism: Methicillin resistant Staphylococcus aureus (07-19-20 @ 09:50)      -  Cefazolin: R 8      -  Clindamycin: S <=0.25      -  Daptomycin: S 0.5      -  Erythromycin: R >4      -  Linezolid: S 2      -  Oxacillin: R >2      -  RIF- Rifampin: S <=1 Should not be used as monotherapy      -  Tetra/Doxy: S <=1      -  Trimethoprim/Sulfamethoxazole: R >2/38      Method Type: YOLIE  Organism: Methicillin resistant Staphylococcus aureus (07-19-20 @ 09:50)      -  Vancomycin: S 1.5      Method Type: ETEST    Culture - Tissue with Gram Stain (collected 07-17-20 @ 14:35)  Source: .Tissue Right Synovium #2  Gram Stain (07-17-20 @ 17:37):    No organisms seen    Rare WBC's  Final Report (07-19-20 @ 09:44):    Rare Methicillin resistant Staphylococcus aureus    Floor previously notified.  Organism: Methicillin resistant Staphylococcus aureus  Methicillin resistant Staphylococcus aureus (07-19-20 @ 09:44)  Organism: Methicillin resistant Staphylococcus aureus (07-19-20 @ 09:44)      -  Vancomycin: S 1.5      Method Type: ETEST  Organism: Methicillin resistant Staphylococcus aureus (07-19-20 @ 09:44)      -  Cefazolin: R >16      -  Clindamycin: S <=0.25      -  Daptomycin: S 0.5      -  Erythromycin: R >4      -  Linezolid: S 2      -  Oxacillin: R >2      -  RIF- Rifampin: S <=1 Should not be used as monotherapy      -  Tetra/Doxy: S <=1      -  Trimethoprim/Sulfamethoxazole: R >2/38      Method Type: YOLIE      TELEMETRY: 	      QTC     ECG:  	   QTC         RADIOLOGY:   DIAGNOSTIC TESTING: [ ] Echocardiogram: [ ]  Catheterization: [ ] Stress Test:      ASSESSMENT/PLAN: 	  MRSA sepsis- patient is at risk for infected leads. JOHN PAUL is negative. Defibrillator pocket looks good.  Repeat blood cultures are negative so far.     Severely reduced ejection fraction-the patient takes diuretics occasionally for weight gain. He is sedentary however denies dyspnea. His pulmonary pressures are high on echo. BNP is elevated but chest x-ray is clear. Rx;d with  metoprolol 25mg daily and digoxin. Start spironolactone 25 daily(on as outpt).     Coronary artery disease-the patient denies chest discomfort. His EKG is uninterpretable secondary to the pacemaker. He was revascularized less than two years ago. There is no clinical evidence for ischemia. Rx;d with aspirin and atorvastatin.  Rx'd with  prasugrel with history of stent thrombosis. Add Zetia.     Defibrillator-off monitor.    The patient has an RCRI score of three. His procedure is low risk. He is high risk from a cardiac standpoint. However he is optimized for surgery.    Discussed with PA.

## 2020-07-22 NOTE — CONSULT NOTE ADULT - SUBJECTIVE AND OBJECTIVE BOX
SICU CONSULT NOTE    HPI:  65M PMH HTN, HLD, CAD s/p CABG, HRrEF (EFof 25-30% 02/2020) s/p [facility] car[facility]le BiV placement, DM, pHTN, Celiac disease, diverticulitis, R total knee replacement (10/2019) c/b infection, R hip fracture s/p ORIF (02/2020). states pain started saurday and has gradually worsened. unable to ambulate on affected side. reports some swelling about the new. no new wounds on affected side. however has had 2 debridements over last 2 weeks by vascular for a diabetic ulcer on the contralateral side. denies any fevers, chills, malaise. states prior to saturday was ambulating just fine.    SICU ADDENDUM:   Patient admitted to hospital on 7/14, now s/p arthroscopic I&D of R knee (7/17) with MRSA in blood and from joint fluid, and debridement of R knee with insertion of antibiotic spacer today (7/22). The procedure was notable for  cc, also given 1.5L LR during case. Given his extensive cardiac history patient will be admitted to SICU for postoperative hemodynamic monitoring. Echo from this admission w/ LVEF 10-15% and global hypokinesis, as well as pulmonary hypertension PASP 68.     PAST MEDICAL & SURGICAL HISTORY:  Diabetic neuropathy  STEMI (ST elevation myocardial infarction)  Diverticulitis  MRSA bacteremia  History of celiac disease  CHF (congestive heart failure): EF ~ 25%  HTN (hypertension)  Diabetes: on insulin pump  Blood clot due to device, implant, or graft: was on blood thinners  HLD (hyperlipidemia)  Osteoarthritis  Atherosclerosis of coronary artery: CAD (coronary artery disease)  Status post percutaneous transluminal coronary angioplasty: in 2012  History of open reduction and internal fixation (ORIF) procedure  Surgery, elective: Right shoulder  Surgery, elective: right knee wound debridement  S/P TKR (total knee replacement), right: with infection Mrsa   per pt he was cleared from MRSA infection  S/P CABG x 1: 2018  Stented coronary artery: 10/18 heart attack  INFERIOR WALL MI  Other postprocedural status: Fixation hardware in foot LEFT    REVIEW OF SYSTEMS:   Pertinent positives/negatives noted in HPI.     HOME MEDICATIONS: asa 81, prasugrel 10 d, atorvastatin 40, digoxin 0.125 d, duloxetine 30 3 caps daily, metop succ 25 d, flomax 0.4 qHS    ALLERGIES:  ACE inhibitors (Hives)  carvedilol (Other)  enalapril (Hives)  Entresto (Other)    FAMILY HISTORY:  Family history of allergies: daughter  Family history of heart disease (Mother)      PHYSICAL EXAM:  T(C): 36.2 (07-22-20 @ 19:01), Max: 37.2 (07-22-20 @ 00:11)  HR: 69 (07-22-20 @ 19:46) (69 - 95)  BP: 90/55 (07-22-20 @ 19:46) (90/47 - 137/84)  RR: 12 (07-22-20 @ 19:46) (12 - 19)  SpO2: 94% (07-22-20 @ 19:46) (94% - 99%)    GENERAL: NAD, Resting comfortably in bed, diaphoretic chest and forehead  HEENT: NCAT, MMM  RESP: Nonlabored breathing, No respiratory distress, on 3L NC  CARD: Normal rate, extremities cool  GI: Soft, NT, ND, No guarding, No rebound tenderness  EXTREM: WWP, No edema  NEURO: AAOx3, No focal deficits  PSYCH: Affect and characteristics of appearance, verbalizations, behaviors are appropriate    LABS:                        8.7    18.45 )-----------( 350      ( 22 Jul 2020 19:49 )             27.9     07-22    135  |  98  |  16  ----------------------------<  275<H>  4.4   |  27  |  0.85    Ca    8.7      22 Jul 2020 06:13    TPro  6.3  /  Alb  3.0<L>  /  TBili  0.9  /  DBili  x   /  AST  13  /  ALT  22  /  AlkPhos  114  07-22      65M PMH HTN, HLD, CAD s/p stent (with thrombosis) and CABG, HRrEF (EF 10-15%) s/p [facility] BiV placement, pHTN (PASP 68), DM, Celiac disease, diverticulitis, R total knee replacement (10/2019) c/b infection, R hip fracture s/p ORIF (02/2020), who presented 7/14 with MRSA infection of R knee now s/p R knee arthroscopic I&D (7/17) and debridement of R knee with insertion of antibiotic spacer today (7/22). The procedure was notable for  cc, also given 1.5L LR during case. Given his extensive cardiac history patient will be admitted to SICU for postoperative hemodynamic monitoring    NEURO: oxy, dilaudid prn  CV: goal MAP >65, EF 10-15%, s/p BiV, cont w/ asa 81, prasugrel 10, metop 25, digoxin 0.125, atorvastatin 40  PULM: pHTN PASP 68, 3L NC, wean as tolerated  GI/FEN: Diabetic diet  : no fields, TOV 2AM  ENDO: ISS  HEME: Hgb 8.7 (10.5 preop), trend H/H transfuse if Hgb <8  ID: MRSA in blood and joint, f/u cx 7/19, cont rifampin and vanc (trough  PPx:  LINES: PIV  WOUNDS/DRAINS:  PT/OT: Not ordered SICU CONSULT NOTE    HPI:  65M PMH HTN, HLD, CAD s/p CABG, HRrEF (EFof 25-30% 02/2020) s/p [facility] BiV placement, DM, pHTN, Celiac disease, diverticulitis, R total knee replacement (10/2019) c/b infection, R hip fracture s/p ORIF (02/2020). states pain started saurday and has gradually worsened. unable to ambulate on affected side. reports some swelling about the new. no new wounds on affected side. however has had 2 debridements over last 2 weeks by vascular for a diabetic ulcer on the contralateral side. denies any fevers, chills, malaise. states prior to saturday was ambulating just fine.    SICU ADDENDUM:   Patient admitted to hospital on 7/14, now s/p arthroscopic I&D of R knee (7/17) with MRSA in blood and from joint fluid, and debridement of R knee with insertion of antibiotic spacer today (7/22). The procedure was notable for  cc, also given 1.5L LR during case. Given his extensive cardiac history patient will be admitted to SICU for postoperative hemodynamic monitoring. Echo from this admission w/ LVEF 10-15% and global hypokinesis, as well as pulmonary hypertension PASP 68.     PAST MEDICAL & SURGICAL HISTORY:  Diabetic neuropathy  STEMI (ST elevation myocardial infarction)  Diverticulitis  MRSA bacteremia  History of celiac disease  CHF (congestive heart failure): EF ~ 25%  HTN (hypertension)  Diabetes: on insulin pump  Blood clot due to device, implant, or graft: was on blood thinners  HLD (hyperlipidemia)  Osteoarthritis  Atherosclerosis of coronary artery: CAD (coronary artery disease)  Status post percutaneous transluminal coronary angioplasty: in 2012  History of open reduction and internal fixation (ORIF) procedure  Surgery, elective: Right shoulder  Surgery, elective: right knee wound debridement  S/P TKR (total knee replacement), right: with infection Mrsa   per pt he was cleared from MRSA infection  S/P CABG x 1: 2018  Stented coronary artery: 10/18 heart attack  INFERIOR WALL MI  Other postprocedural status: Fixation hardware in foot LEFT    REVIEW OF SYSTEMS:   Pertinent positives/negatives noted in HPI.     HOME MEDICATIONS: asa 81, prasugrel 10 d, atorvastatin 40, digoxin 0.125 d, duloxetine 30 3 caps daily, metop succ 25 d, flomax 0.4 qHS    ALLERGIES:  ACE inhibitors (Hives)  carvedilol (Other)  enalapril (Hives)  Entresto (Other)    FAMILY HISTORY:  Family history of allergies: daughter  Family history of heart disease (Mother)      PHYSICAL EXAM:  T(C): 36.2 (07-22-20 @ 19:01), Max: 37.2 (07-22-20 @ 00:11)  HR: 69 (07-22-20 @ 19:46) (69 - 95)  BP: 90/55 (07-22-20 @ 19:46) (90/47 - 137/84)  RR: 12 (07-22-20 @ 19:46) (12 - 19)  SpO2: 94% (07-22-20 @ 19:46) (94% - 99%)    GENERAL: NAD, Resting comfortably in bed, diaphoretic chest and forehead  HEENT: NCAT, MMM  RESP: Nonlabored breathing, No respiratory distress, on 3L NC  CARD: Normal rate, extremities cool  GI: Soft, NT, ND, No guarding, No rebound tenderness  EXTREM: R knee brace in place, no edema  NEURO: AAOx3, No focal deficits  PSYCH: Affect and characteristics of appearance, verbalizations, behaviors are appropriate    LABS:                        8.7    18.45 )-----------( 350      ( 22 Jul 2020 19:49 )             27.9     07-22    135  |  98  |  16  ----------------------------<  275<H>  4.4   |  27  |  0.85    Ca    8.7      22 Jul 2020 06:13    TPro  6.3  /  Alb  3.0<L>  /  TBili  0.9  /  DBili  x   /  AST  13  /  ALT  22  /  AlkPhos  114  07-22      65M PMH HTN, HLD, CAD s/p stent (with thrombosis) and CABG, HRrEF (EF 10-15%) s/p [facility] BiV placement, pHTN (PASP 68), DM, Celiac disease, diverticulitis, R total knee replacement (10/2019) c/b infection, R hip fracture s/p ORIF (02/2020), who presented 7/14 with MRSA infection of R knee now s/p R knee arthroscopic I&D (7/17) and debridement of R knee with insertion of antibiotic spacer today (7/22). The procedure was notable for  cc, also given 1.5L LR during case. Given his extensive cardiac history patient will be admitted to SICU for postoperative hemodynamic monitoring    NEURO: oxy, dilaudid prn  CV: goal MAP >65, EF 10-15%, s/p BiV, cont w/ asa 81, prasugrel 10, metop 25, digoxin 0.125, atorvastatin 40  PULM: pHTN PASP 68, 3L NC, wean as tolerated  GI/FEN: Diabetic diet  : no fields, TOV 2AM  ENDO: ISS  HEME: Hgb 8.7 (10.5 preop), trend H/H transfuse if Hgb <8  ID: MRSA in blood and joint, f/u cx 7/19, cont rifampin and vanc  PPx: HSQ  LINES: PIVx2, L rad jayleen  WOUNDS/DRAINS: R knee brace  PT/OT: f/u ortho recs SICU CONSULT NOTE    HPI:  65M PMH HTN, HLD, CAD s/p CABG, HRrEF (EFof 25-30% 02/2020) s/p [facility] BiV placement, DM, pHTN, Celiac disease, diverticulitis, R total knee replacement (10/2019) c/b infection, R hip fracture s/p ORIF (02/2020). states pain started saurday and has gradually worsened. unable to ambulate on affected side. reports some swelling about the new. no new wounds on affected side. however has had 2 debridements over last 2 weeks by vascular for a diabetic ulcer on the contralateral side. denies any fevers, chills, malaise. states prior to saturday was ambulating just fine.    SICU ADDENDUM:   Patient admitted to hospital on 7/14, now s/p arthroscopic I&D of R knee (7/17) with MRSA in blood and from joint fluid, and debridement of R knee with insertion of antibiotic spacer today (7/22). The procedure was notable for  cc, also given 1.5L LR during case. Given his extensive cardiac history patient will be admitted to SICU for postoperative hemodynamic monitoring. Echo from this admission w/ LVEF 10-15% and global hypokinesis, as well as pulmonary hypertension PASP 68.     PAST MEDICAL & SURGICAL HISTORY:  Diabetic neuropathy  STEMI (ST elevation myocardial infarction)  Diverticulitis  MRSA bacteremia  History of celiac disease  CHF (congestive heart failure): EF ~ 25%  HTN (hypertension)  Diabetes: on insulin pump  Blood clot due to device, implant, or graft: was on blood thinners  HLD (hyperlipidemia)  Osteoarthritis  Atherosclerosis of coronary artery: CAD (coronary artery disease)  Status post percutaneous transluminal coronary angioplasty: in 2012  History of open reduction and internal fixation (ORIF) procedure  Surgery, elective: Right shoulder  Surgery, elective: right knee wound debridement  S/P TKR (total knee replacement), right: with infection Mrsa   per pt he was cleared from MRSA infection  S/P CABG x 1: 2018  Stented coronary artery: 10/18 heart attack  INFERIOR WALL MI  Other postprocedural status: Fixation hardware in foot LEFT    REVIEW OF SYSTEMS:   Pertinent positives/negatives noted in HPI.     HOME MEDICATIONS: asa 81, prasugrel 10 d, atorvastatin 40, digoxin 0.125 d, duloxetine 30 3 caps daily, metop succ 25 d, flomax 0.4 qHS    ALLERGIES:  ACE inhibitors (Hives)  carvedilol (Other)  enalapril (Hives)  Entresto (Other)    FAMILY HISTORY:  Family history of allergies: daughter  Family history of heart disease (Mother)      PHYSICAL EXAM:  T(C): 36.2 (07-22-20 @ 19:01), Max: 37.2 (07-22-20 @ 00:11)  HR: 69 (07-22-20 @ 19:46) (69 - 95)  BP: 90/55 (07-22-20 @ 19:46) (90/47 - 137/84)  RR: 12 (07-22-20 @ 19:46) (12 - 19)  SpO2: 94% (07-22-20 @ 19:46) (94% - 99%)    GENERAL: NAD, Resting comfortably in bed, diaphoretic chest and forehead  HEENT: NCAT, MMM  RESP: Nonlabored breathing, No respiratory distress, on 3L NC  CARD: Normal rate, extremities cool  GI: Soft, NT, ND, No guarding, No rebound tenderness  EXTREM: R knee brace in place, no edema  NEURO: AAOx3, No focal deficits  PSYCH: Affect and characteristics of appearance, verbalizations, behaviors are appropriate    LABS:                        8.7    18.45 )-----------( 350      ( 22 Jul 2020 19:49 )             27.9     07-22    135  |  98  |  16  ----------------------------<  275<H>  4.4   |  27  |  0.85    Ca    8.7      22 Jul 2020 06:13    TPro  6.3  /  Alb  3.0<L>  /  TBili  0.9  /  DBili  x   /  AST  13  /  ALT  22  /  AlkPhos  114  07-22      65M PMH HTN, HLD, CAD s/p stent (with thrombosis) and CABG, HRrEF (EF 10-15%) s/p [facility] BiV placement, pHTN (PASP 68), DM, Celiac disease, diverticulitis, R total knee replacement (10/2019) c/b infection, R hip fracture s/p ORIF (02/2020), who presented 7/14 with MRSA infection of R knee now s/p R knee arthroscopic I&D (7/17) and debridement of R knee with insertion of antibiotic spacer today (7/22). The procedure was notable for  cc, also given 1.5L LR during case. Given his extensive cardiac history patient will be admitted to SICU for postoperative hemodynamic monitoring.    NEURO: oxy, dilaudid prn, home duloxetine  CV: goal MAP >65, EF 10-15%, s/p BiV, cont w/ asa 81, prasugrel 10, metop 25, digoxin 0.125, atorvastatin 40  PULM: pHTN PASP 68, 3L NC, wean as tolerated  GI/FEN: Diabetic diet, PPI, miralax/senna  : no fields, TOV 2AM, flomax  ENDO: ISS  HEME: Hgb 8.7 (10.5 preop), trend H/H transfuse if Hgb <8  ID: MRSA in blood and joint, f/u cx 7/19, cont rifampin and vanc  PPx: HSQ  LINES: PIVx2, L rad jayleen  WOUNDS/DRAINS: R knee brace, L shin ulcer (WTD dressing daily)  PT/OT: f/u ortho recs SICU CONSULT NOTE    HPI:  65M PMH HTN, HLD, CAD s/p CABG, HRrEF (EFof 25-30% 02/2020) s/p [facility] BiV placement, DM, pHTN, Celiac disease, diverticulitis, R total knee replacement (10/2019) c/b infection, R hip fracture s/p ORIF (02/2020). states pain started saurday and has gradually worsened. unable to ambulate on affected side. reports some swelling about the new. no new wounds on affected side. however has had 2 debridements over last 2 weeks by vascular for a diabetic ulcer on the contralateral side. denies any fevers, chills, malaise. states prior to saturday was ambulating just fine.    SICU ADDENDUM:   Patient admitted to hospital on 7/14, now s/p arthroscopic I&D of R knee (7/17) with MRSA in blood and from joint fluid, and R TKA explant and insertion of antibiotic spacer today (7/22). The procedure was notable for  cc, also given 1.5L LR during case. Given his extensive cardiac history patient will be admitted to SICU for postoperative hemodynamic monitoring. Echo from this admission w/ LVEF 10-15% and global hypokinesis, as well as pulmonary hypertension PASP 68.     PAST MEDICAL & SURGICAL HISTORY:  Diabetic neuropathy  STEMI (ST elevation myocardial infarction)  Diverticulitis  MRSA bacteremia  History of celiac disease  CHF (congestive heart failure): EF ~ 25%  HTN (hypertension)  Diabetes: on insulin pump  Blood clot due to device, implant, or graft: was on blood thinners  HLD (hyperlipidemia)  Osteoarthritis  Atherosclerosis of coronary artery: CAD (coronary artery disease)  Status post percutaneous transluminal coronary angioplasty: in 2012  History of open reduction and internal fixation (ORIF) procedure  Surgery, elective: Right shoulder  Surgery, elective: right knee wound debridement  S/P TKR (total knee replacement), right: with infection Mrsa   per pt he was cleared from MRSA infection  S/P CABG x 1: 2018  Stented coronary artery: 10/18 heart attack  INFERIOR WALL MI  Other postprocedural status: Fixation hardware in foot LEFT    REVIEW OF SYSTEMS:   Pertinent positives/negatives noted in HPI.     HOME MEDICATIONS: asa 81, prasugrel 10 d, atorvastatin 40, digoxin 0.125 d, duloxetine 30 3 caps daily, metop succ 25 d, flomax 0.4 qHS    ALLERGIES:  ACE inhibitors (Hives)  carvedilol (Other)  enalapril (Hives)  Entresto (Other)    FAMILY HISTORY:  Family history of allergies: daughter  Family history of heart disease (Mother)      PHYSICAL EXAM:  T(C): 36.2 (07-22-20 @ 19:01), Max: 37.2 (07-22-20 @ 00:11)  HR: 69 (07-22-20 @ 19:46) (69 - 95)  BP: 90/55 (07-22-20 @ 19:46) (90/47 - 137/84)  RR: 12 (07-22-20 @ 19:46) (12 - 19)  SpO2: 94% (07-22-20 @ 19:46) (94% - 99%)    GENERAL: NAD, Resting comfortably in bed, diaphoretic chest and forehead  HEENT: NCAT, MMM  RESP: Nonlabored breathing, No respiratory distress, on 3L NC  CARD: Normal rate, extremities cool  GI: Soft, NT, ND, No guarding, No rebound tenderness  EXTREM: R knee brace in place, L shin dressing c/d/i, prevena to suction, no edema  NEURO: AAOx3, No focal deficits  PSYCH: Affect and characteristics of appearance, verbalizations, behaviors are appropriate    LABS:                        8.7    18.45 )-----------( 350      ( 22 Jul 2020 19:49 )             27.9     07-22    135  |  98  |  16  ----------------------------<  275<H>  4.4   |  27  |  0.85    Ca    8.7      22 Jul 2020 06:13    TPro  6.3  /  Alb  3.0<L>  /  TBili  0.9  /  DBili  x   /  AST  13  /  ALT  22  /  AlkPhos  114  07-22      65M PMH HTN, HLD, CAD s/p stent (with thrombosis) and CABG, HRrEF (EF 10-15%) s/p [facility] BiV placement, pHTN (PASP 68), DM, Celiac disease, diverticulitis, R total knee replacement (10/2019) c/b infection, R hip fracture s/p ORIF (02/2020), who presented 7/14 with MRSA infection of R knee now s/p R knee arthroscopic I&D (7/17) and R TKA explant with insertion of antibiotic spacer today (7/22). The procedure was notable for  cc, also given 1.5L LR during case. Given his extensive cardiac history patient will be admitted to SICU for postoperative hemodynamic monitoring.    NEURO: oxy, dilaudid prn, home duloxetine  CV: goal MAP >65, EF 10-15%, s/p BiV, cont w/ asa 81, prasugrel 10, metop 25, digoxin 0.125, atorvastatin 40  PULM: pHTN PASP 68, 3L NC, wean as tolerated  GI/FEN: Diabetic diet, PPI, miralax/senna  : no fields, TOV 2AM, flomax  ENDO: ISS  HEME: Hgb 8.7 (10.5 preop), trend H/H transfuse if Hgb <8  ID: MRSA in blood and joint, f/u cx 7/19, cont rifampin and vanc  PPx: HSQ  LINES: PIVx2, L rad jayleen  WOUNDS/DRAINS: R knee brace, prevena vac, L shin ulcer (WTD dressing daily)  PT/OT: WBAT RLE

## 2020-07-22 NOTE — PROGRESS NOTE ADULT - SUBJECTIVE AND OBJECTIVE BOX
Ortho Post Op Check    Procedure: R TKA explant, antibiotic spacer  Surgeon: Oh    Pt comfortable without complaints, pain controlled. Denies CP, SOB, N/V, numbness/tingling     Vital Signs Last 24 Hrs  T(C): 36.3 (07-22-20 @ 21:25), Max: 36.3 (07-22-20 @ 21:25)  T(F): 97.3 (07-22-20 @ 21:25), Max: 97.3 (07-22-20 @ 21:25)  HR: 62 (07-22-20 @ 21:30) (62 - 73)  BP: 90/55 (07-22-20 @ 19:46) (90/47 - 93/54)  BP(mean): 69 (07-22-20 @ 19:46) (63 - 70)  RR: 18 (07-22-20 @ 21:30) (12 - 21)  SpO2: 96% (07-22-20 @ 21:30) (94% - 96%)    Physical Exam:  General: Resting comfortably in bed. AAOx3. NAD.  Extremities:        LLE: Dressing in place LLE, diabetic ulcer being managed by vascular team. SILT L2-S1 distribution, symmetric. TA/EHL/FHL/GS motor intact. Feet cool b/l       RLE: Prevena in place with good function. SILT L2-S1 distribution, symmetric. TA/EHL/FHL/GS motor intact. Feet cool b/l, cap refill <3 sec                            8.7    18.45 )-----------( 350      ( 22 Jul 2020 19:49 )             27.9   22 Jul 2020 19:50    136    |  101    |  17     ----------------------------<  267    4.4     |  25     |  0.76     Ca    8.0        22 Jul 2020 19:50  Phos  3.2       22 Jul 2020 19:50  Mg     1.6       22 Jul 2020 19:50    TPro  6.3    /  Alb  3.0    /  TBili  0.9    /  DBili  x      /  AST  13     /  ALT  22     /  AlkPhos  114    22 Jul 2020 06:13      Post-op X-Ray:    A/P: 65yMale POD#0 s/p R total knee explant, antibiotic spacer 7/22  - Stable  - Pain Control  - DVT ppx:  - Post op abx: Vanco, rifampin  - In SICU postop given low EF, hemodynamic status; likely ok to step down to tele in AM if stable overnight  - PT, WBS: WBAT RLE    Ortho Pager [phone]

## 2020-07-22 NOTE — PROGRESS NOTE ADULT - SUBJECTIVE AND OBJECTIVE BOX
Pt comfortable without complaints, pain controlled  Denies CP, SOB, N/V, numbness/tingling     Vital Signs Last 24 Hrs  T(C): 36.6 (22 Jul 2020 04:56), Max: 37.2 (22 Jul 2020 00:11)  T(F): 97.9 (22 Jul 2020 04:56), Max: 99 (22 Jul 2020 00:11)  HR: 95 (22 Jul 2020 04:56) (71 - 95)  BP: 137/84 (22 Jul 2020 04:56) (105/67 - 137/84)  BP(mean): --  RR: 18 (22 Jul 2020 04:56) (17 - 18)  SpO2: 97% (22 Jul 2020 04:56) (95% - 99%)    focused exam:  General: Pt Alert and oriented, NAD  R knee with 2 sutures LEANDER, mild swelling/ erythema; hemovac x1 with purulent drainage  Pulses: +2DP, WWP feet  Sensation: SILT to baseline (h/o b/l neuropathy)  Motor: 5/5 EHL/FHL/TA/GS BLE                          10.8   11.82 )-----------( 332      ( 21 Jul 2020 07:41 )             35.6   21 Jul 2020 07:41    136    |  100    |  16     ----------------------------<  208    4.0     |  25     |  0.60     Ca    8.7        21 Jul 2020 07:41    TPro  6.1    /  Alb  2.7    /  TBili  0.7    /  DBili  x      /  AST  16     /  ALT  28     /  AlkPhos  104    21 Jul 2020 07:41      A/P: 65yMale POD#5 s/p right knee arthroscopic I+D  - VSS, continue to f/u labs, ESR/CRP slightly uptrending today  - Pt has been refusing digoxin daily as he claims he no longer takes it  -JOHN PAUL wnl yesterday for infected leads  - LLE vascular ulcer- collagenase and treatment as per vascular  - continue hemovac until dry; will likely need to go home with hemovac + VNS  - Blood Cx NGTD, ok for PICC this afternoon if ID is ok  - Pain Control  - DVT ppx: ASA + effient  - PT, WBS: WBAT  - OOB for meals, I/S  - dispo: home with PICC for IV abx/ home infusion services/ VNS for hemovac pending BC still negative tomorrow and medical/ cardiology clearance for dc    Ortho Pager [phone] Pt comfortable without complaints, pain controlled  Denies CP, SOB, N/V, numbness/tingling     Vital Signs Last 24 Hrs  T(C): 36.6 (22 Jul 2020 04:56), Max: 37.2 (22 Jul 2020 00:11)  T(F): 97.9 (22 Jul 2020 04:56), Max: 99 (22 Jul 2020 00:11)  HR: 95 (22 Jul 2020 04:56) (71 - 95)  BP: 137/84 (22 Jul 2020 04:56) (105/67 - 137/84)  BP(mean): --  RR: 18 (22 Jul 2020 04:56) (17 - 18)  SpO2: 97% (22 Jul 2020 04:56) (95% - 99%)    focused exam:  General: Pt Alert and oriented, NAD  R knee with 2 sutures LEANDER, mild swelling/ erythema; hemovac x1 with purulent drainage  Pulses: +2DP, WWP feet  Sensation: SILT to baseline (h/o b/l neuropathy)  Motor: 5/5 EHL/FHL/TA/GS BLE                          10.8   11.82 )-----------( 332      ( 21 Jul 2020 07:41 )             35.6   21 Jul 2020 07:41    136    |  100    |  16     ----------------------------<  208    4.0     |  25     |  0.60     Ca    8.7        21 Jul 2020 07:41    TPro  6.1    /  Alb  2.7    /  TBili  0.7    /  DBili  x      /  AST  16     /  ALT  28     /  AlkPhos  104    21 Jul 2020 07:41      A/P: 65yMale POD#5 s/p right knee arthroscopic I+D  - VSS, continue to f/u labs, ESR/CRP slightly uptrending today  - Pt has been refusing digoxin daily as he claims he no longer takes it  -JOHN PAUL wnl yesterday for infected leads  - LLE vascular ulcer- collagenase and treatment as per vascular  - continue hemovac until dry; will likely need to go home with hemovac + VNS  - Blood Cx NGTD  - Pain Control  - DVT ppx: ASA + effient  - PT, WBS: WBAT  - OOB for meals, I/S    Ortho Pager [phone]

## 2020-07-22 NOTE — PROGRESS NOTE ADULT - SUBJECTIVE AND OBJECTIVE BOX
Interval Events: Reviewed  Patient seen and examined at bedside.    Patient is a 65y old  Male who presents with a chief complaint of septic knee (22 Jul 2020 09:51)    he is doing OK  PAST MEDICAL & SURGICAL HISTORY:  Diabetic neuropathy  STEMI (ST elevation myocardial infarction)  Diverticulitis  MRSA bacteremia  History of celiac disease  CHF (congestive heart failure): EF ~ 25%  HTN (hypertension)  Diabetes: on insulin pump  Blood clot due to device, implant, or graft: was on blood thinners  HLD (hyperlipidemia)  Osteoarthritis  Atherosclerosis of coronary artery: CAD (coronary artery disease)  Status post percutaneous transluminal coronary angioplasty: in 2012  History of open reduction and internal fixation (ORIF) procedure  Surgery, elective: Right shoulder  Surgery, elective: right knee wound debridement  S/P TKR (total knee replacement), right: with infection Mrsa   per pt he was cleared from MRSA infection  S/P CABG x 1: 2018  Stented coronary artery: 10/18 heart attack  INFERIOR WALL MI  Other postprocedural status: Fixation hardware in foot LEFT      MEDICATIONS:  Pulmonary:    Antimicrobials:  rifAMPin 300 milliGRAM(s) Oral every 12 hours    Anticoagulants:  heparin   Injectable 5000 Unit(s) SubCutaneous every 8 hours    Cardiac:  digoxin     Tablet 0.125 milliGRAM(s) Oral daily  metoprolol succinate ER 25 milliGRAM(s) Oral daily  spironolactone 25 milliGRAM(s) Oral daily  tamsulosin 0.4 milliGRAM(s) Oral at bedtime      Allergies    ACE inhibitors (Hives)  carvedilol (Other)  enalapril (Hives)  Entresto (Other)    Intolerances        Vital Signs Last 24 Hrs  T(C): 36.3 (22 Jul 2020 21:25), Max: 37.2 (22 Jul 2020 00:11)  T(F): 97.3 (22 Jul 2020 21:25), Max: 99 (22 Jul 2020 00:11)  HR: 62 (22 Jul 2020 21:30) (62 - 95)  BP: 90/55 (22 Jul 2020 19:46) (90/47 - 137/84)  BP(mean): 69 (22 Jul 2020 19:46) (63 - 70)  RR: 18 (22 Jul 2020 21:30) (12 - 21)  SpO2: 96% (22 Jul 2020 21:30) (94% - 99%)    07-21 @ 07:01  -  07-22 @ 07:00  --------------------------------------------------------  IN: 500 mL / OUT: 2405 mL / NET: -1905 mL    07-22 @ 07:01  - 07-22 @ 22:18  --------------------------------------------------------  IN: 490 mL / OUT: 0 mL / NET: 490 mL          Review of Systems:   •	General: negative  •	Skin/Breast: negative  •	Ophthalmologic: negative  •	ENMT: negative  •	Respiratory and Thorax: negative  •	Cardiovascular: negative  •	Gastrointestinal: negative  •	Genitourinary: negative  •	Musculoskeletal: negative  •	Neurological: negative  •	Psychiatric: negative  •	Hematology/Lymphatics: negative  •	Endocrine: negative  •	Allergic/Immunologic: negative    Physical Exam:   • Constitutional:	Well-developed, well nourished  • Eyes:	EOMI; PERRL; no drainage or redness  • ENMT:	No oral lesions; no gross abnormalities  • Neck	No bruits; no thyromegaly or nodules  • Breasts:	not examined  • Back:	No deformity or limitation of movement  • Respiratory:	Breath Sounds equal & clear to percussion & auscultation, no accessory muscle use  • Cardiovascular:	Regular rate & rhythm, normal S1, S2; no murmurs, gallops or rubs; no S3, S4  • Gastrointestinal:	Soft, non-tender, no hepatosplenomegaly, normal bowel sounds  • Genitourinary:	not examined  • Rectal: not examined  • Extremities:	No cyanosis, clubbing or edema  • Vascular:	Equal and normal pulses (carotid, femoral, dorsalis pedis)  • Neurologica:l	not examined  • Skin:	No lesions; no rash  • Lymph Nodes:	No lymphadedenopathy  • Musculoskeletal:	No joint pain, swelling or deformity; no limitation of movement        LABS:  ABG - ( 22 Jul 2020 19:49 )  pH, Arterial: 7.37  pH, Blood: x     /  pCO2: 43    /  pO2: 70    / HCO3: 24    / Base Excess: -1.2  /  SaO2: 92                  CBC Full  -  ( 22 Jul 2020 19:49 )  WBC Count : 18.45 K/uL  RBC Count : 3.59 M/uL  Hemoglobin : 8.7 g/dL  Hematocrit : 27.9 %  Platelet Count - Automated : 350 K/uL  Mean Cell Volume : 77.7 fl  Mean Cell Hemoglobin : 24.2 pg  Mean Cell Hemoglobin Concentration : 31.2 gm/dL  Auto Neutrophil # : x  Auto Lymphocyte # : x  Auto Monocyte # : x  Auto Eosinophil # : x  Auto Basophil # : x  Auto Neutrophil % : x  Auto Lymphocyte % : x  Auto Monocyte % : x  Auto Eosinophil % : x  Auto Basophil % : x    07-22    136  |  101  |  17  ----------------------------<  267<H>  4.4   |  25  |  0.76    Ca    8.0<L>      22 Jul 2020 19:50  Phos  3.2     07-22  Mg     1.6     07-22    TPro  6.3  /  Alb  3.0<L>  /  TBili  0.9  /  DBili  x   /  AST  13  /  ALT  22  /  AlkPhos  114  07-22                        RADIOLOGY & ADDITIONAL STUDIES (The following images were personally reviewed):  Loja:                                     No  Urine output:                       adequate  DVT prophylaxis:                 Yes  Flattus:                                  Yes  Bowel movement:              No

## 2020-07-22 NOTE — PROGRESS NOTE ADULT - ATTENDING COMMENTS
Patient seen by me. He continues to have frankly purulent drainage from the Hemovac and markers and WBC remain high (with some climbing within the last 48 hours) despite therapeutic vancomycin levels. Unfortunately, it appears that the minimally invasive intervention attempted with the arthroscopic I&D has failed. I think that at this point there is no other choice but to move forward with right knee explantation and spacer. We discussed the risks, benefits, and alternatives to the procedure. We discussed that failure of this procedure, including wound breakdown, may necessitate above-knee amputation. However, I think that this must be done to prevent recurrent sepsis and death. He agreed to proceed.    Will add on for surgery today; also will book for tomorrow in case no OR availability today. NPO for now, maintain same antibiotics. Will check with ID regarding PICC timing postop.

## 2020-07-22 NOTE — PROGRESS NOTE ADULT - ATTENDING COMMENTS
Patient seen and examined with house-staff during bedside rounds.  Resident note read, including vitals, physical findings, laboratory data, and radiological reports.   Revisions included below.  Direct personal management at bed side and extensive interpretation of the data.  Plan was outlined and discussed in details with the housestaff.  Decision making of high complexity  Action taken for acute disease activity to reflect the level of care provided:  - medication reconciliation  - review laboratory data  he required ICU postop due to 500 cc blood loss

## 2020-07-22 NOTE — PROVIDER CONTACT NOTE (OTHER) - ACTION/TREATMENT ORDERED:
will continue to monitor
labs sent.  maintenance LR at 80 ml/hr ordered however pt's history of EF 10-15% and pt received 1500 ml LR in OR, maintenance fluid held at this time. awaiting lab results and decision of MDs.

## 2020-07-22 NOTE — PROGRESS NOTE ADULT - SUBJECTIVE AND OBJECTIVE BOX
INTERVAL HPI/OVERNIGHT EVENTS:  Afebrile.  R knee stiffness more than pain    CONSTITUTIONAL:  No fever, chills, night sweats  EYES:  No photophobia or visual changes  CARDIOVASCULAR:  No chest pain  RESPIRATORY:  No cough, wheezing, or SOB   GASTROINTESTINAL:  No nausea, vomiting, diarrhea, constipation, or abdominal pain  GENITOURINARY:  No frequency, urgency, dysuria or hematuria  NEUROLOGIC:  No headache, lightheadedness      ANTIBIOTICS/RELEVANT:    Vancomycin 1 g IV q12h (7/17-present)  Rifampin 300 mg po q12h (7/18-present)    Pip-tazo 7/16      Vital Signs Last 24 Hrs  T(C): 36.7 (22 Jul 2020 13:22), Max: 37.2 (22 Jul 2020 00:11)  T(F): 98 (22 Jul 2020 13:22), Max: 99 (22 Jul 2020 00:11)  HR: 79 (22 Jul 2020 13:22) (71 - 95)  BP: 108/69 (22 Jul 2020 13:22) (101/57 - 137/84)  BP(mean): --  RR: 18 (22 Jul 2020 13:22) (18 - 19)  SpO2: 99% (22 Jul 2020 13:22) (97% - 99%)    PHYSICAL EXAM:  Constitutional:  Awake, alert, nontoxic appearing  Eyes:  Sclerae anicteric, conjunctivae clear, PERRL  Ear/Nose/Throat:  No nasal exudate or sinus tenderness;  No buccal mucosal lesions, no pharyngeal erythema or exudate	  Neck:  Supple, no adenopathy  L ant chest ICD pocket without erythema/warmth   Respiratory:  Clear bilaterally  Cardiovascular:  RRR, S1S2, no murmur appreciated  Gastrointestinal:  Symmetric, normoactive BS, soft, NT, no masses, guarding or rebound.  No HSM  Extremities:  No edema.  R knee with minimal warmth, decreased flexion to ~165.  Drain with purulent drainage.    LABS:                        10.5   11.55 )-----------( 361      ( 22 Jul 2020 06:13 )             34.3         07-22    135  |  98  |  16  ----------------------------<  275<H>  4.4   |  27  |  0.85    Ca    8.7      22 Jul 2020 06:13    TPro  6.3  /  Alb  3.0<L>  /  TBili  0.9  /  DBili  x   /  AST  13  /  ALT  22  /  AlkPhos  114  07-22    Sedimentation Rate, Erythrocyte: 59 mm/Hr (07.22.20 @ 06:13)  C-Reactive Protein, Serum: 10.83 mg/dL (07.22.20 @ 06:13)    Vancomycin trough 14.5 (7/22 10:51 - prior dose 00:41)      MICROBIOLOGY:    Blood cultures:  7/16 X 2 - MRSA (4/4 bottles)  vancomycin YOLIE 1.5;  7/19 X 2 - NGTD    R knee aspirate 7/17 X 2 - few WBCs, no orgs seen;  NGTD;  AFB cultures X 2 - smear neg    OR cultures 7/17:  Body fluid R synovium 1:  few MRSA;  AFB stain neg;  AFB and fungal cultures in progress  Tissue R synovium 2:  no orgs, rare WBCs;  rare MRSA;  AFB stain neg;  AFB and fungal cultures in progress      RADIOLOGY & ADDITIONAL STUDIES:

## 2020-07-22 NOTE — PROGRESS NOTE ADULT - SUBJECTIVE AND OBJECTIVE BOX
Ortho Note    Pt seen and examined with Dr. Castro this morning around 9am. Comfortable without complaints, pain controlled  Denies CP, SOB, N/V, numbness/tingling. Due to increasing inflammatory markers and WBC count,   and continued purulent drainage in hemovac, patient will be taken back to OR today for   R knee explant and antibiotic spacer.    Vital Signs Last 24 Hrs  T(C): 36.8 (07-22-20 @ 09:03), Max: 36.8 (07-22-20 @ 09:03)  T(F): 98.2 (07-22-20 @ 09:03), Max: 98.2 (07-22-20 @ 09:03)  HR: 76 (07-22-20 @ 09:03) (76 - 76)  BP: 101/57 (07-22-20 @ 09:03) (101/57 - 101/57)  BP(mean): --  RR: 19 (07-22-20 @ 09:03) (19 - 19)  SpO2: 99% (07-22-20 @ 09:03) (99% - 99%)  AVSS    General: Pt Alert and oriented, NAD  DSG- sutures LEANDER on R leg CDI  Pulses: feet WWP  Sensation: SILT BLE to baseline ( h/o neuropathy)  Motor: 5/5 EHL/FHL/TA/GS BLE                          10.5   11.55 )-----------( 361      ( 22 Jul 2020 06:13 )             34.3   22 Jul 2020 06:13    135    |  98     |  16     ----------------------------<  275    4.4     |  27     |  0.85       TPro  6.3    /  Alb  3.0    /  TBili  0.9    /  DBili  x      /  AST  13     /  ALT  22     /  AlkPhos  114    22 Jul 2020 06:13      A/P: 65yMale POD#4 s/p R knee arthroscopic I+D  - WBC, ESR, CRP increasing; continue to monitor  - Pain Control  - DVT ppx: Hold for OR today; restart effient and ASA post-op  - PT, WBS: WBAT  - OOB for meals, I/S  - continue bowel regimen  - appreciate cardiology recs; avoid fluid overload  - appreciate ID recs- vanc troph 14.5 within range- continue 1g q12 and will repeat troph prior to 4th dose (7/23 11pm prior to 12am dose on 7/24);   - NPO  - dispo: OR for R knee explant/ abx spacer today; will eventually go home with PICC line and IV abx    Ortho Pager [phone]

## 2020-07-22 NOTE — BRIEF OPERATIVE NOTE - NSICDXBRIEFPROCEDURE_GEN_ALL_CORE_FT
PROCEDURES:  Debridement of knee with insertion of antibiotic spacer 22-Jul-2020 18:12:57  Amor Lopez
PROCEDURES:  Arthroscopic incision and drainage of knee 17-Jul-2020 13:45:49  Vanessa Lombardi

## 2020-07-23 ENCOUNTER — RESULT REVIEW (OUTPATIENT)
Age: 65
End: 2020-07-23

## 2020-07-23 LAB
ANION GAP SERPL CALC-SCNC: 7 MMOL/L — SIGNIFICANT CHANGE UP (ref 5–17)
BLD GP AB SCN SERPL QL: NEGATIVE — SIGNIFICANT CHANGE UP
BUN SERPL-MCNC: 17 MG/DL — SIGNIFICANT CHANGE UP (ref 7–23)
CALCIUM SERPL-MCNC: 7.7 MG/DL — LOW (ref 8.4–10.5)
CHLORIDE SERPL-SCNC: 99 MMOL/L — SIGNIFICANT CHANGE UP (ref 96–108)
CO2 SERPL-SCNC: 26 MMOL/L — SIGNIFICANT CHANGE UP (ref 22–31)
CREAT SERPL-MCNC: 0.81 MG/DL — SIGNIFICANT CHANGE UP (ref 0.5–1.3)
CRP SERPL-MCNC: 7.45 MG/DL — HIGH (ref 0–0.4)
ERYTHROCYTE [SEDIMENTATION RATE] IN BLOOD: 44 MM/HR — HIGH
GLUCOSE BLDC GLUCOMTR-MCNC: 192 MG/DL — HIGH (ref 70–99)
GLUCOSE BLDC GLUCOMTR-MCNC: 204 MG/DL — HIGH (ref 70–99)
GLUCOSE BLDC GLUCOMTR-MCNC: 274 MG/DL — HIGH (ref 70–99)
GLUCOSE BLDC GLUCOMTR-MCNC: 283 MG/DL — HIGH (ref 70–99)
GLUCOSE SERPL-MCNC: 307 MG/DL — HIGH (ref 70–99)
HCT VFR BLD CALC: 23.6 % — LOW (ref 39–50)
HCT VFR BLD CALC: 29.2 % — LOW (ref 39–50)
HGB BLD-MCNC: 7.2 G/DL — LOW (ref 13–17)
HGB BLD-MCNC: 8.8 G/DL — LOW (ref 13–17)
MAGNESIUM SERPL-MCNC: 1.6 MG/DL — SIGNIFICANT CHANGE UP (ref 1.6–2.6)
MCHC RBC-ENTMCNC: 23.3 PG — LOW (ref 27–34)
MCHC RBC-ENTMCNC: 24.1 PG — LOW (ref 27–34)
MCHC RBC-ENTMCNC: 30.1 GM/DL — LOW (ref 32–36)
MCHC RBC-ENTMCNC: 30.5 GM/DL — LOW (ref 32–36)
MCV RBC AUTO: 76.4 FL — LOW (ref 80–100)
MCV RBC AUTO: 80 FL — SIGNIFICANT CHANGE UP (ref 80–100)
NIGHT BLUE STAIN TISS: SIGNIFICANT CHANGE UP
NRBC # BLD: 0 /100 WBCS — SIGNIFICANT CHANGE UP (ref 0–0)
NRBC # BLD: 0 /100 WBCS — SIGNIFICANT CHANGE UP (ref 0–0)
PHOSPHATE SERPL-MCNC: 2.9 MG/DL — SIGNIFICANT CHANGE UP (ref 2.5–4.5)
PLATELET # BLD AUTO: 269 K/UL — SIGNIFICANT CHANGE UP (ref 150–400)
PLATELET # BLD AUTO: 283 K/UL — SIGNIFICANT CHANGE UP (ref 150–400)
POTASSIUM SERPL-MCNC: 4.2 MMOL/L — SIGNIFICANT CHANGE UP (ref 3.5–5.3)
POTASSIUM SERPL-SCNC: 4.2 MMOL/L — SIGNIFICANT CHANGE UP (ref 3.5–5.3)
RBC # BLD: 3.09 M/UL — LOW (ref 4.2–5.8)
RBC # BLD: 3.65 M/UL — LOW (ref 4.2–5.8)
RBC # FLD: 17.5 % — HIGH (ref 10.3–14.5)
RBC # FLD: 17.8 % — HIGH (ref 10.3–14.5)
RH IG SCN BLD-IMP: POSITIVE — SIGNIFICANT CHANGE UP
SODIUM SERPL-SCNC: 132 MMOL/L — LOW (ref 135–145)
SPECIMEN SOURCE: SIGNIFICANT CHANGE UP
WBC # BLD: 13.45 K/UL — HIGH (ref 3.8–10.5)
WBC # BLD: 14 K/UL — HIGH (ref 3.8–10.5)
WBC # FLD AUTO: 13.45 K/UL — HIGH (ref 3.8–10.5)
WBC # FLD AUTO: 14 K/UL — HIGH (ref 3.8–10.5)

## 2020-07-23 PROCEDURE — 99233 SBSQ HOSP IP/OBS HIGH 50: CPT | Mod: GC

## 2020-07-23 PROCEDURE — 99232 SBSQ HOSP IP/OBS MODERATE 35: CPT

## 2020-07-23 PROCEDURE — 88304 TISSUE EXAM BY PATHOLOGIST: CPT | Mod: 26

## 2020-07-23 PROCEDURE — 99232 SBSQ HOSP IP/OBS MODERATE 35: CPT | Mod: GC

## 2020-07-23 RX ORDER — CYCLOBENZAPRINE HYDROCHLORIDE 10 MG/1
5 TABLET, FILM COATED ORAL THREE TIMES A DAY
Refills: 0 | Status: DISCONTINUED | OUTPATIENT
Start: 2020-07-23 | End: 2020-07-25

## 2020-07-23 RX ORDER — MAGNESIUM SULFATE 500 MG/ML
2 VIAL (ML) INJECTION
Refills: 0 | Status: COMPLETED | OUTPATIENT
Start: 2020-07-23 | End: 2020-07-23

## 2020-07-23 RX ORDER — INSULIN LISPRO 100/ML
VIAL (ML) SUBCUTANEOUS
Refills: 0 | Status: DISCONTINUED | OUTPATIENT
Start: 2020-07-23 | End: 2020-08-10

## 2020-07-23 RX ORDER — HYDROMORPHONE HYDROCHLORIDE 2 MG/ML
0.5 INJECTION INTRAMUSCULAR; INTRAVENOUS; SUBCUTANEOUS ONCE
Refills: 0 | Status: DISCONTINUED | OUTPATIENT
Start: 2020-07-23 | End: 2020-07-23

## 2020-07-23 RX ORDER — ACETAMINOPHEN 500 MG
1000 TABLET ORAL ONCE
Refills: 0 | Status: COMPLETED | OUTPATIENT
Start: 2020-07-23 | End: 2020-07-23

## 2020-07-23 RX ORDER — INSULIN LISPRO 100/ML
8 VIAL (ML) SUBCUTANEOUS
Refills: 0 | Status: DISCONTINUED | OUTPATIENT
Start: 2020-07-23 | End: 2020-08-10

## 2020-07-23 RX ORDER — HYDROMORPHONE HYDROCHLORIDE 2 MG/ML
0.25 INJECTION INTRAMUSCULAR; INTRAVENOUS; SUBCUTANEOUS ONCE
Refills: 0 | Status: DISCONTINUED | OUTPATIENT
Start: 2020-07-23 | End: 2020-07-23

## 2020-07-23 RX ADMIN — HEPARIN SODIUM 5000 UNIT(S): 5000 INJECTION INTRAVENOUS; SUBCUTANEOUS at 15:01

## 2020-07-23 RX ADMIN — Medication 2: at 16:23

## 2020-07-23 RX ADMIN — PANTOPRAZOLE SODIUM 40 MILLIGRAM(S): 20 TABLET, DELAYED RELEASE ORAL at 06:22

## 2020-07-23 RX ADMIN — HYDROMORPHONE HYDROCHLORIDE 0.5 MILLIGRAM(S): 2 INJECTION INTRAMUSCULAR; INTRAVENOUS; SUBCUTANEOUS at 03:20

## 2020-07-23 RX ADMIN — Medication 400 MILLIGRAM(S): at 16:42

## 2020-07-23 RX ADMIN — Medication 50 GRAM(S): at 16:14

## 2020-07-23 RX ADMIN — HYDROMORPHONE HYDROCHLORIDE 0.5 MILLIGRAM(S): 2 INJECTION INTRAMUSCULAR; INTRAVENOUS; SUBCUTANEOUS at 08:27

## 2020-07-23 RX ADMIN — HYDROMORPHONE HYDROCHLORIDE 0.5 MILLIGRAM(S): 2 INJECTION INTRAMUSCULAR; INTRAVENOUS; SUBCUTANEOUS at 05:19

## 2020-07-23 RX ADMIN — Medication 81 MILLIGRAM(S): at 17:14

## 2020-07-23 RX ADMIN — Medication 8 UNIT(S): at 16:23

## 2020-07-23 RX ADMIN — ATORVASTATIN CALCIUM 40 MILLIGRAM(S): 80 TABLET, FILM COATED ORAL at 21:27

## 2020-07-23 RX ADMIN — SPIRONOLACTONE 25 MILLIGRAM(S): 25 TABLET, FILM COATED ORAL at 06:22

## 2020-07-23 RX ADMIN — Medication 6: at 11:53

## 2020-07-23 RX ADMIN — OXYCODONE HYDROCHLORIDE 10 MILLIGRAM(S): 5 TABLET ORAL at 11:18

## 2020-07-23 RX ADMIN — HYDROMORPHONE HYDROCHLORIDE 0.5 MILLIGRAM(S): 2 INJECTION INTRAMUSCULAR; INTRAVENOUS; SUBCUTANEOUS at 03:40

## 2020-07-23 RX ADMIN — Medication 250 MILLIGRAM(S): at 01:39

## 2020-07-23 RX ADMIN — TAMSULOSIN HYDROCHLORIDE 0.4 MILLIGRAM(S): 0.4 CAPSULE ORAL at 21:27

## 2020-07-23 RX ADMIN — HYDROMORPHONE HYDROCHLORIDE 0.25 MILLIGRAM(S): 2 INJECTION INTRAMUSCULAR; INTRAVENOUS; SUBCUTANEOUS at 18:11

## 2020-07-23 RX ADMIN — HYDROMORPHONE HYDROCHLORIDE 0.5 MILLIGRAM(S): 2 INJECTION INTRAMUSCULAR; INTRAVENOUS; SUBCUTANEOUS at 08:45

## 2020-07-23 RX ADMIN — Medication 250 MILLIGRAM(S): at 13:13

## 2020-07-23 RX ADMIN — HEPARIN SODIUM 5000 UNIT(S): 5000 INJECTION INTRAVENOUS; SUBCUTANEOUS at 06:25

## 2020-07-23 RX ADMIN — Medication 0.12 MILLIGRAM(S): at 06:22

## 2020-07-23 RX ADMIN — Medication 50 GRAM(S): at 18:50

## 2020-07-23 RX ADMIN — Medication 8 UNIT(S): at 11:53

## 2020-07-23 RX ADMIN — POLYETHYLENE GLYCOL 3350 17 GRAM(S): 17 POWDER, FOR SOLUTION ORAL at 11:46

## 2020-07-23 RX ADMIN — HYDROMORPHONE HYDROCHLORIDE 0.5 MILLIGRAM(S): 2 INJECTION INTRAMUSCULAR; INTRAVENOUS; SUBCUTANEOUS at 12:36

## 2020-07-23 RX ADMIN — HEPARIN SODIUM 5000 UNIT(S): 5000 INJECTION INTRAVENOUS; SUBCUTANEOUS at 23:31

## 2020-07-23 RX ADMIN — OXYCODONE HYDROCHLORIDE 10 MILLIGRAM(S): 5 TABLET ORAL at 15:01

## 2020-07-23 RX ADMIN — Medication 4: at 22:45

## 2020-07-23 RX ADMIN — PRASUGREL 10 MILLIGRAM(S): 5 TABLET, FILM COATED ORAL at 11:47

## 2020-07-23 RX ADMIN — OXYCODONE HYDROCHLORIDE 10 MILLIGRAM(S): 5 TABLET ORAL at 10:29

## 2020-07-23 RX ADMIN — Medication 81 MILLIGRAM(S): at 06:22

## 2020-07-23 RX ADMIN — INSULIN GLARGINE 12 UNIT(S): 100 INJECTION, SOLUTION SUBCUTANEOUS at 22:58

## 2020-07-23 RX ADMIN — Medication 4 UNIT(S): at 06:22

## 2020-07-23 RX ADMIN — HYDROMORPHONE HYDROCHLORIDE 0.5 MILLIGRAM(S): 2 INJECTION INTRAMUSCULAR; INTRAVENOUS; SUBCUTANEOUS at 12:09

## 2020-07-23 RX ADMIN — HYDROMORPHONE HYDROCHLORIDE 0.5 MILLIGRAM(S): 2 INJECTION INTRAMUSCULAR; INTRAVENOUS; SUBCUTANEOUS at 05:04

## 2020-07-23 RX ADMIN — OXYCODONE HYDROCHLORIDE 10 MILLIGRAM(S): 5 TABLET ORAL at 21:44

## 2020-07-23 RX ADMIN — HYDROMORPHONE HYDROCHLORIDE 0.25 MILLIGRAM(S): 2 INJECTION INTRAMUSCULAR; INTRAVENOUS; SUBCUTANEOUS at 18:17

## 2020-07-23 RX ADMIN — CHLORHEXIDINE GLUCONATE 1 APPLICATION(S): 213 SOLUTION TOPICAL at 11:46

## 2020-07-23 RX ADMIN — OXYCODONE HYDROCHLORIDE 5 MILLIGRAM(S): 5 TABLET ORAL at 01:49

## 2020-07-23 RX ADMIN — Medication 1000 MILLIGRAM(S): at 17:11

## 2020-07-23 RX ADMIN — DULOXETINE HYDROCHLORIDE 90 MILLIGRAM(S): 30 CAPSULE, DELAYED RELEASE ORAL at 11:47

## 2020-07-23 RX ADMIN — OXYCODONE HYDROCHLORIDE 10 MILLIGRAM(S): 5 TABLET ORAL at 14:38

## 2020-07-23 RX ADMIN — CYCLOBENZAPRINE HYDROCHLORIDE 5 MILLIGRAM(S): 10 TABLET, FILM COATED ORAL at 14:38

## 2020-07-23 RX ADMIN — Medication 25 MILLIGRAM(S): at 06:22

## 2020-07-23 NOTE — PROGRESS NOTE ADULT - SUBJECTIVE AND OBJECTIVE BOX
Ortho Progress Note    Procedure: R TKA explant, antibiotic spacer  Surgeon: Oh    Pt comfortable without complaints, pain controlled. Denies CP, SOB, N/V, numbness/tingling     Vital Signs Last 24 Hrs  T(C): 36 (23 Jul 2020 05:04), Max: 36.8 (22 Jul 2020 09:03)  T(F): 96.8 (23 Jul 2020 05:04), Max: 98.2 (22 Jul 2020 09:03)  HR: 73 (23 Jul 2020 04:00) (62 - 79)  BP: 103/50 (23 Jul 2020 04:00) (88/54 - 108/69)  BP(mean): 71 (23 Jul 2020 04:00) (63 - 71)  RR: 12 (23 Jul 2020 04:00) (10 - 21)  SpO2: 96% (23 Jul 2020 04:00) (92% - 99%)    Physical Exam:  General: Resting comfortably in bed. AAOx3. NAD.  Extremities:        LLE: Dressing in place LLE, diabetic ulcer being managed by vascular team. SILT L2-S1 distribution, symmetric. TA/EHL/FHL/GS motor intact. Feet cool b/l       RLE: Prevena in place with good function. SILT L2-S1 distribution, symmetric. TA/EHL/FHL/GS motor intact. Feet cool b/l, cap refill <3 sec                            7.2    13.45 )-----------( 269      ( 23 Jul 2020 06:04 )             23.6       A/P: 65yMale POD#0 s/p R total knee explant, antibiotic spacer 7/22  - Stable  - Pain Control  - DVT ppx: ASA/Effient  - 1u pRBC for low Hgb this AM  - Post op abx: Vanco, rifampin  - In SICU postop given low EF, hemodynamic status; will step down today  - PT, WBS: TTWB RLE    Ortho Pager [phone]

## 2020-07-23 NOTE — PROGRESS NOTE ADULT - SUBJECTIVE AND OBJECTIVE BOX
24 hr events:  O/N: admitted to SICU, passed TOV  7/22- OR today for R knee explant and abx spacer. Avoid IV fluids post-op and encourage orals. Restart ASA and effient post-op [    ]. New PT order  [    ] Patient on zetia 10mg oral daily at home, and spironolactone 25mg- can restart after this procedure as per cardiology (Werner). . *Patient has been refusing digoxin* (HR stable). Next vanc troph due tomrrow night at 11pm prior to 12am dose. Continue wet to dry dressings once daily on L vascular wound- can be done by nursing. Does not need collagenase or bacitracin.     SUBJECTIVE: Resting comfortably in bed, pain controlled. Voiding spontaneously. No n/v, CP, SOB, dizziness, lightheadedness.       MEDICATIONS  (STANDING):  aspirin enteric coated 81 milliGRAM(s) Oral two times a day  atorvastatin 40 milliGRAM(s) Oral at bedtime  BACItracin   Ointment 1 Application(s) Topical daily  chlorhexidine 2% Cloths 1 Application(s) Topical daily  collagenase Ointment 1 Application(s) Topical daily  dextrose 5%. 1000 milliLiter(s) (50 mL/Hr) IV Continuous <Continuous>  dextrose 50% Injectable 12.5 Gram(s) IV Push once  dextrose 50% Injectable 25 Gram(s) IV Push once  dextrose 50% Injectable 25 Gram(s) IV Push once  digoxin     Tablet 0.125 milliGRAM(s) Oral daily  DULoxetine 90 milliGRAM(s) Oral daily  heparin   Injectable 5000 Unit(s) SubCutaneous every 8 hours  insulin glargine Injectable (LANTUS) 12 Unit(s) SubCutaneous at bedtime  insulin lispro (HumaLOG) corrective regimen sliding scale   SubCutaneous at bedtime  insulin lispro Injectable (HumaLOG) 4 Unit(s) SubCutaneous three times a day before meals  lactated ringers. 1000 milliLiter(s) (80 mL/Hr) IV Continuous <Continuous>  metoprolol succinate ER 25 milliGRAM(s) Oral daily  pantoprazole    Tablet 40 milliGRAM(s) Oral before breakfast  polyethylene glycol 3350 17 Gram(s) Oral daily  prasugrel 10 milliGRAM(s) Oral daily  rifAMPin 300 milliGRAM(s) Oral every 12 hours  spironolactone 25 milliGRAM(s) Oral daily  tamsulosin 0.4 milliGRAM(s) Oral at bedtime  vancomycin  IVPB 1000 milliGRAM(s) IV Intermittent every 12 hours    MEDICATIONS  (PRN):  acetaminophen   Tablet .. 975 milliGRAM(s) Oral every 8 hours PRN Temp greater or equal to 38C (100.4F), Mild Pain (1 - 3)  aluminum hydroxide/magnesium hydroxide/simethicone Suspension 30 milliLiter(s) Oral four times a day PRN Indigestion  dextrose 40% Gel 15 Gram(s) Oral once PRN Blood Glucose LESS THAN 70 milliGRAM(s)/deciliter  glucagon  Injectable 1 milliGRAM(s) IntraMuscular once PRN Glucose LESS THAN 70 milligrams/deciliter  HYDROmorphone  Injectable 0.5 milliGRAM(s) IV Push every 15 minutes PRN Severe Pain (7 - 10)  magnesium hydroxide Suspension 30 milliLiter(s) Oral daily PRN Constipation  ondansetron Injectable 4 milliGRAM(s) IV Push every 6 hours PRN Nausea and/or Vomiting  oxyCODONE    IR 5 milliGRAM(s) Oral every 4 hours PRN Moderate Pain (4 - 6)  oxyCODONE    IR 10 milliGRAM(s) Oral every 4 hours PRN Severe Pain (7 - 10)  senna 2 Tablet(s) Oral at bedtime PRN Constipation      ICU Vital Signs Last 24 Hrs  T(C): 36 (23 Jul 2020 05:04), Max: 36.8 (22 Jul 2020 09:03)  T(F): 96.8 (23 Jul 2020 05:04), Max: 98.2 (22 Jul 2020 09:03)  HR: 73 (23 Jul 2020 04:00) (62 - 79)  BP: 103/50 (23 Jul 2020 04:00) (88/54 - 108/69)  BP(mean): 71 (23 Jul 2020 04:00) (63 - 71)  ABP: 107/39 (23 Jul 2020 04:00) (76/36 - 118/43)  ABP(mean): 58 (23 Jul 2020 04:00) (49 - 69)  RR: 12 (23 Jul 2020 04:00) (10 - 21)  SpO2: 96% (23 Jul 2020 04:00) (92% - 99%)      Physical Exam:  General: NAD  HEENT: NC/AT, EOMI, normal hearing, no oral lesions, neck supple   Pulmonary: Nonlabored breathing, no respiratory distress, CTA-B  Cardiovascular: regular rate and rhythm, no murmurs  Abdominal: soft, NT/ND, +BS, no organomegaly  Extremities: LLE: Dressing in place LLE, diabetic ulcer care per vascular surgery, motor intact. Feet cool b/l; RLE: Prevena in place with good function, motor intact. Feet cool b/l, cap refill <3 sec  Neuro: A/O x3, normal motor/sensation, no focal deficits  Pulses: palpable distal pulses      I&O's Summary    22 Jul 2020 07:01  -  23 Jul 2020 07:00  --------------------------------------------------------  IN: 850 mL / OUT: 200 mL / NET: 650 mL        LABS:                        7.2    13.45 )-----------( 269      ( 23 Jul 2020 06:04 )             23.6     07-23    132<L>  |  99  |  17  ----------------------------<  307<H>  4.2   |  26  |  0.81    Ca    7.7<L>      23 Jul 2020 06:04  Phos  2.9     07-23  Mg     1.6     07-23    TPro  6.3  /  Alb  3.0<L>  /  TBili  0.9  /  DBili  x   /  AST  13  /  ALT  22  /  AlkPhos  114  07-22        CAPILLARY BLOOD GLUCOSE      POCT Blood Glucose.: 283 mg/dL (23 Jul 2020 06:21)  POCT Blood Glucose.: 258 mg/dL (22 Jul 2020 21:48)  POCT Blood Glucose.: 224 mg/dL (22 Jul 2020 11:34)    LIVER FUNCTIONS - ( 22 Jul 2020 06:13 )  Alb: 3.0 g/dL / Pro: 6.3 g/dL / ALK PHOS: 114 U/L / ALT: 22 U/L / AST: 13 U/L / GGT: x             Cultures:    RADIOLOGY & ADDITIONAL STUDIES:

## 2020-07-23 NOTE — PROGRESS NOTE ADULT - SUBJECTIVE AND OBJECTIVE BOX
Ortho Note    Pt seen and examiend. Reports moderately controlled pain/ muscle spasms.  Otherwise, reports feeling "well", just tired.  Denies CP, SOB, N/V, numbness/tingling     Vital Signs Last 24 Hrs  T(C): 36.3 (07-23-20 @ 09:00), Max: 36.3 (07-23-20 @ 09:00)  T(F): 97.4 (07-23-20 @ 09:00), Max: 97.4 (07-23-20 @ 09:00)  HR: 80 (07-23-20 @ 11:00) (75 - 80)  BP: 94/52 (07-23-20 @ 11:00) (85/54 - 102/55)  BP(mean): 70 (07-23-20 @ 11:00) (65 - 75)  RR: 18 (07-23-20 @ 11:00) (14 - 22)  SpO2: 94% (07-23-20 @ 11:00) (89% - 98%)  AVSS    focused exam  General: Pt Alert and oriented, NAD  DSG- prevena to R knee CDI, KI In place; kerlix wrap on LLE vascular wound  Pulses: Feet warm to touch; diminished pulses at baseline, follows with vascular outpatient  Sensation: SILT to baseline BLE, has baseline neuropathy  Motor: 5/5 EHL/FHL/TA/GS                          7.2    13.45 )-----------( 269      ( 23 Jul 2020 06:04 )             23.6   23 Jul 2020 06:04    132    |  99     |  17     ----------------------------<  307    4.2     |  26     |  0.81     Ca    7.7        23 Jul 2020 06:04  Phos  2.9       23 Jul 2020 06:04  Mg     1.6       23 Jul 2020 06:04    TPro  6.3    /  Alb  3.0    /  TBili  0.9    /  DBili  x      /  AST  13     /  ALT  22     /  AlkPhos  114    22 Jul 2020 06:13      A/P: 65yMale s/p R knee arthroscopic I+D 7/17 with post-op course c/b increasing inflammatory markers and WBC, now s/p right knee explant and abx spacer 7/22  - Hgb 7.2- receving 1 unit PRBCs, asymptomatic at rest; f/u repeat CBC in afternoon  - Pain Control- flexeril added PRN spasms  - DVT ppx: HSQ; will transition to effient and ASA  - PT, WBS: TTWB RLE  - OOB for PT after transfusion, aggressive I/S  - appreciate medicine and cardiology recs; continue home medications  - appreciate ID recs, continue vancomycin; will follow-up troph at 1am today prio to 2am dose (will be 4th dose since last troph, 1 dose was given in OR at 1pm yesterday not recorded on [place] EMAR); will insert PICC when ID recommends  -dispo: pending new PT eval- [facility] vs home with PICC home infusion services; likely early next week    Ortho Pager [phone] Ortho Note    Pt seen and examiend. Reports moderately controlled pain/ muscle spasms.  Otherwise, reports feeling "well", just tired.  Denies CP, SOB, N/V, numbness/tingling     Vital Signs Last 24 Hrs  T(C): 36.3 (07-23-20 @ 09:00), Max: 36.3 (07-23-20 @ 09:00)  T(F): 97.4 (07-23-20 @ 09:00), Max: 97.4 (07-23-20 @ 09:00)  HR: 80 (07-23-20 @ 11:00) (75 - 80)  BP: 94/52 (07-23-20 @ 11:00) (85/54 - 102/55)  BP(mean): 70 (07-23-20 @ 11:00) (65 - 75)  RR: 18 (07-23-20 @ 11:00) (14 - 22)  SpO2: 94% (07-23-20 @ 11:00) (89% - 98%)  AVSS    focused exam  General: Pt Alert and oriented, NAD  DSG- prevena to R knee CDI, KI In place; kerlix wrap on LLE vascular wound  Pulses: Feet warm to touch; diminished pulses at baseline, follows with vascular outpatient  Sensation: SILT to baseline BLE, has baseline neuropathy  Motor: 5/5 EHL/FHL/TA/GS                          7.2    13.45 )-----------( 269      ( 23 Jul 2020 06:04 )             23.6   23 Jul 2020 06:04    132    |  99     |  17     ----------------------------<  307    4.2     |  26     |  0.81     Ca    7.7        23 Jul 2020 06:04  Phos  2.9       23 Jul 2020 06:04  Mg     1.6       23 Jul 2020 06:04    TPro  6.3    /  Alb  3.0    /  TBili  0.9    /  DBili  x      /  AST  13     /  ALT  22     /  AlkPhos  114    22 Jul 2020 06:13      A/P: 65yMale s/p R knee arthroscopic I+D 7/17 with post-op course c/b increasing inflammatory markers and WBC, now s/p right knee explant and abx spacer 7/22  - Hgb 7.2- receving 1 unit PRBCs, asymptomatic at rest; f/u repeat CBC in afternoon  - Pain Control- flexeril added PRN spasms  - DVT ppx: HSQ; will transition to effient and ASA  - PT, WBS: TTWB RLE  - OOB for PT after transfusion, aggressive I/S  - hyperglycemia- insulin regimen adjusted by medicine; continue to monitor  - appreciate medicine and cardiology recs; continue home medications;   - appreciate ID recs, continue vancomycin; will follow-up troph at 1am today prio to 2am dose (will be 4th dose since last troph, 1 dose was given in OR at 1pm yesterday not recorded on [place] EMAR); will insert PICC when ID recommends  - step-down to ortho telemetry today  -dispo: pending new PT eval- [facility] vs home with PICC home infusion services; likely early next week    Ortho Pager [phone]

## 2020-07-23 NOTE — PROGRESS NOTE ADULT - SUBJECTIVE AND OBJECTIVE BOX
Interval Events: Reviewed  Patient seen and examined at bedside.    Patient is a 65y old  Male who presents with a chief complaint of septic knee (22 Jul 2020 09:51)    he is doing well  PAST MEDICAL & SURGICAL HISTORY:  Diabetic neuropathy  STEMI (ST elevation myocardial infarction)  Diverticulitis  MRSA bacteremia  History of celiac disease  CHF (congestive heart failure): EF ~ 25%  HTN (hypertension)  Diabetes: on insulin pump  Blood clot due to device, implant, or graft: was on blood thinners  HLD (hyperlipidemia)  Osteoarthritis  Atherosclerosis of coronary artery: CAD (coronary artery disease)  Status post percutaneous transluminal coronary angioplasty: in 2012  History of open reduction and internal fixation (ORIF) procedure  Surgery, elective: Right shoulder  Surgery, elective: right knee wound debridement  S/P TKR (total knee replacement), right: with infection Mrsa   per pt he was cleared from MRSA infection  S/P CABG x 1: 2018  Stented coronary artery: 10/18 heart attack  INFERIOR WALL MI  Other postprocedural status: Fixation hardware in foot LEFT      MEDICATIONS:  Pulmonary:    Antimicrobials:  rifAMPin 300 milliGRAM(s) Oral every 12 hours  vancomycin  IVPB 1000 milliGRAM(s) IV Intermittent every 12 hours    Anticoagulants:  aspirin enteric coated 81 milliGRAM(s) Oral two times a day  heparin   Injectable 5000 Unit(s) SubCutaneous every 8 hours  prasugrel 10 milliGRAM(s) Oral daily    Cardiac:  digoxin     Tablet 0.125 milliGRAM(s) Oral daily  metoprolol succinate ER 25 milliGRAM(s) Oral daily  spironolactone 25 milliGRAM(s) Oral daily  tamsulosin 0.4 milliGRAM(s) Oral at bedtime      Allergies    ACE inhibitors (Hives)  carvedilol (Other)  enalapril (Hives)  Entresto (Other)    Intolerances        Vital Signs Last 24 Hrs  T(C): 36 (23 Jul 2020 05:04), Max: 36.7 (22 Jul 2020 13:22)  T(F): 96.8 (23 Jul 2020 05:04), Max: 98 (22 Jul 2020 13:22)  HR: 77 (23 Jul 2020 08:00) (62 - 79)  BP: 85/54 (23 Jul 2020 08:00) (85/54 - 108/69)  BP(mean): 65 (23 Jul 2020 08:00) (63 - 75)  RR: 22 (23 Jul 2020 08:00) (9 - 22)  SpO2: 94% (23 Jul 2020 08:00) (92% - 99%)    07-22 @ 07:01  -  07-23 @ 07:00  --------------------------------------------------------  IN: 850 mL / OUT: 500 mL / NET: 350 mL          Review of Systems:   •	General: negative  •	Skin/Breast: negative  •	Ophthalmologic: negative  •	ENMT: negative  •	Respiratory and Thorax: negative  •	Cardiovascular: negative  •	Gastrointestinal: negative  •	Genitourinary: negative  •	Musculoskeletal: negative  •	Neurological: negative  •	Psychiatric: negative  •	Hematology/Lymphatics: negative  •	Endocrine: negative  •	Allergic/Immunologic: negative    Physical Exam:   • Constitutional:	Well-developed, well nourished  • Eyes:	EOMI; PERRL; no drainage or redness  • ENMT:	No oral lesions; no gross abnormalities  • Neck	No bruits; no thyromegaly or nodules  • Breasts:	not examined  • Back:	No deformity or limitation of movement  • Respiratory:	Breath Sounds equal & clear to percussion & auscultation, no accessory muscle use  • Cardiovascular:	Regular rate & rhythm, normal S1, S2; no murmurs, gallops or rubs; no S3, S4  • Gastrointestinal:	Soft, non-tender, no hepatosplenomegaly, normal bowel sounds  • Genitourinary:	not examined  • Rectal: not examined  • Extremities:	No cyanosis, clubbing or edema  • Vascular:	Equal and normal pulses (carotid, femoral, dorsalis pedis)  • Neurologica:l	not examined  • Skin:	No lesions; no rash  • Lymph Nodes:	No lymphadedenopathy  • Musculoskeletal:	No joint pain, swelling or deformity; no limitation of movement        LABS:  ABG - ( 22 Jul 2020 19:49 )  pH, Arterial: 7.37  pH, Blood: x     /  pCO2: 43    /  pO2: 70    / HCO3: 24    / Base Excess: -1.2  /  SaO2: 92                  CBC Full  -  ( 23 Jul 2020 06:04 )  WBC Count : 13.45 K/uL  RBC Count : 3.09 M/uL  Hemoglobin : 7.2 g/dL  Hematocrit : 23.6 %  Platelet Count - Automated : 269 K/uL  Mean Cell Volume : 76.4 fl  Mean Cell Hemoglobin : 23.3 pg  Mean Cell Hemoglobin Concentration : 30.5 gm/dL  Auto Neutrophil # : x  Auto Lymphocyte # : x  Auto Monocyte # : x  Auto Eosinophil # : x  Auto Basophil # : x  Auto Neutrophil % : x  Auto Lymphocyte % : x  Auto Monocyte % : x  Auto Eosinophil % : x  Auto Basophil % : x    07-23    132<L>  |  99  |  17  ----------------------------<  307<H>  4.2   |  26  |  0.81    Ca    7.7<L>      23 Jul 2020 06:04  Phos  2.9     07-23  Mg     1.6     07-23    TPro  6.3  /  Alb  3.0<L>  /  TBili  0.9  /  DBili  x   /  AST  13  /  ALT  22  /  AlkPhos  114  07-22                    Culture Results:   Testing in progress (07-22 @ 22:11)      RADIOLOGY & ADDITIONAL STUDIES (The following images were personally reviewed):  Loja:                                     No  Urine output:                       adequate  DVT prophylaxis:                 Yes  Flattus:                                  Yes  Bowel movement:              No

## 2020-07-23 NOTE — PROGRESS NOTE ADULT - SUBJECTIVE AND OBJECTIVE BOX
INTERVAL HPI/OVERNIGHT EVENTS:    Patient was seen and examined at bedside.  Feels well today.  S/P debridement of right knee with explant and spacer placement     CONSTITUTIONAL:  Negative fever or chills, feels well, good appetite  EYES:  Negative  blurry vision or double vision  CARDIOVASCULAR:  Negative for chest pain or palpitations  RESPIRATORY:  Negative for cough, wheezing, or SOB   GASTROINTESTINAL:  Negative for nausea, vomiting, diarrhea, constipation, or abdominal pain  GENITOURINARY:  Negative frequency, urgency or dysuria  NEUROLOGIC:  No headache, confusion, dizziness, lightheadedness      ANTIBIOTICS/RELEVANT:    MEDICATIONS  (STANDING):  aspirin enteric coated 81 milliGRAM(s) Oral two times a day  atorvastatin 40 milliGRAM(s) Oral at bedtime  chlorhexidine 2% Cloths 1 Application(s) Topical daily  dextrose 5%. 1000 milliLiter(s) (50 mL/Hr) IV Continuous <Continuous>  dextrose 50% Injectable 12.5 Gram(s) IV Push once  dextrose 50% Injectable 25 Gram(s) IV Push once  dextrose 50% Injectable 25 Gram(s) IV Push once  digoxin     Tablet 0.125 milliGRAM(s) Oral daily  DULoxetine 90 milliGRAM(s) Oral daily  heparin   Injectable 5000 Unit(s) SubCutaneous every 8 hours  insulin glargine Injectable (LANTUS) 12 Unit(s) SubCutaneous at bedtime  insulin lispro (HumaLOG) corrective regimen sliding scale   SubCutaneous Before meals and at bedtime  insulin lispro Injectable (HumaLOG) 8 Unit(s) SubCutaneous three times a day before meals  lactated ringers. 1000 milliLiter(s) (80 mL/Hr) IV Continuous <Continuous>  metoprolol succinate ER 25 milliGRAM(s) Oral daily  pantoprazole    Tablet 40 milliGRAM(s) Oral before breakfast  polyethylene glycol 3350 17 Gram(s) Oral daily  prasugrel 10 milliGRAM(s) Oral daily  rifAMPin 300 milliGRAM(s) Oral every 12 hours  spironolactone 25 milliGRAM(s) Oral daily  tamsulosin 0.4 milliGRAM(s) Oral at bedtime  vancomycin  IVPB 1000 milliGRAM(s) IV Intermittent every 12 hours    MEDICATIONS  (PRN):  acetaminophen   Tablet .. 975 milliGRAM(s) Oral every 8 hours PRN Temp greater or equal to 38C (100.4F), Mild Pain (1 - 3)  aluminum hydroxide/magnesium hydroxide/simethicone Suspension 30 milliLiter(s) Oral four times a day PRN Indigestion  cyclobenzaprine 5 milliGRAM(s) Oral three times a day PRN Muscle Spasm  dextrose 40% Gel 15 Gram(s) Oral once PRN Blood Glucose LESS THAN 70 milliGRAM(s)/deciliter  glucagon  Injectable 1 milliGRAM(s) IntraMuscular once PRN Glucose LESS THAN 70 milligrams/deciliter  magnesium hydroxide Suspension 30 milliLiter(s) Oral daily PRN Constipation  ondansetron Injectable 4 milliGRAM(s) IV Push every 6 hours PRN Nausea and/or Vomiting  oxyCODONE    IR 5 milliGRAM(s) Oral every 4 hours PRN Moderate Pain (4 - 6)  oxyCODONE    IR 10 milliGRAM(s) Oral every 4 hours PRN Severe Pain (7 - 10)  senna 2 Tablet(s) Oral at bedtime PRN Constipation        Vital Signs Last 24 Hrs  T(C): 37.7 (23 Jul 2020 14:16), Max: 37.7 (23 Jul 2020 14:16)  T(F): 99.8 (23 Jul 2020 14:16), Max: 99.8 (23 Jul 2020 14:16)  HR: 81 (23 Jul 2020 15:01) (62 - 82)  BP: 95/50 (23 Jul 2020 15:01) (65/46 - 103/50)  BP(mean): 70 (23 Jul 2020 15:01) (51 - 75)  RR: 18 (23 Jul 2020 15:01) (9 - 26)  SpO2: 92% (23 Jul 2020 15:01) (89% - 100%)    PHYSICAL EXAM:  Constitutional:  non-toxic, no distress  Eyes:  no icterus   Ear/Nose/Throat: no oral lesion  Neck:  supple  Respiratory:  clear   Cardiovascular: S1S2 RRR, no murmurs  Gastrointestinal:soft, (+) BS, no HSM  Extremities:  right leg in brace   Vascular: DP Pulse:	right normal; left normal      LABS:                        7.2    13.45 )-----------( 269      ( 23 Jul 2020 06:04 )             23.6     07-23    132<L>  |  99  |  17  ----------------------------<  307<H>  4.2   |  26  |  0.81    Ca    7.7<L>      23 Jul 2020 06:04  Phos  2.9     07-23  Mg     1.6     07-23    TPro  6.3  /  Alb  3.0<L>  /  TBili  0.9  /  DBili  x   /  AST  13  /  ALT  22  /  AlkPhos  114  07-22          MICROBIOLOGY:    Culture - Acid Fast - Tissue w/Smear (07.22.20 @ 22:11)    Specimen Source: .Tissue Right Knee Synovium    Acid Fast Bacilli Smear:   No acid fast bacilli seen by fluorochrome stain    Culture - Tissue with Gram Stain (07.22.20 @ 16:04)    Gram Stain:   No organisms seen  Rare WBC's    Specimen Source: .Tissue Right Knee Synovium    Culture Results:   No growth to date.        RADIOLOGY & ADDITIONAL STUDIES:    < from: Xray Knee 1 or 2 Views, Right (07.22.20 @ 18:17) >  IMPRESSION:   Status post placement of a knee cement spacer.

## 2020-07-23 NOTE — PROGRESS NOTE ADULT - ATTENDING COMMENTS
Patient seen and examined with house-staff during bedside rounds.  Resident note read, including vitals, physical findings, laboratory data, and radiological reports.   Revisions included below.  Direct personal management at bed side and extensive interpretation of the data.  Plan was outlined and discussed in details with the housestaff.  Decision making of high complexity  Action taken for acute disease activity to reflect the level of care provided:  - medication reconciliation  - review laboratory data  DC a line an dtransfuse

## 2020-07-24 DIAGNOSIS — D50.0 IRON DEFICIENCY ANEMIA SECONDARY TO BLOOD LOSS (CHRONIC): ICD-10-CM

## 2020-07-24 LAB
ALBUMIN SERPL ELPH-MCNC: 2.4 G/DL — LOW (ref 3.3–5)
ALP SERPL-CCNC: 119 U/L — SIGNIFICANT CHANGE UP (ref 40–120)
ALT FLD-CCNC: 20 U/L — SIGNIFICANT CHANGE UP (ref 10–45)
ANION GAP SERPL CALC-SCNC: 10 MMOL/L — SIGNIFICANT CHANGE UP (ref 5–17)
AST SERPL-CCNC: 13 U/L — SIGNIFICANT CHANGE UP (ref 10–40)
BILIRUB SERPL-MCNC: 0.7 MG/DL — SIGNIFICANT CHANGE UP (ref 0.2–1.2)
BUN SERPL-MCNC: 12 MG/DL — SIGNIFICANT CHANGE UP (ref 7–23)
CALCIUM SERPL-MCNC: 8.2 MG/DL — LOW (ref 8.4–10.5)
CHLORIDE SERPL-SCNC: 97 MMOL/L — SIGNIFICANT CHANGE UP (ref 96–108)
CO2 SERPL-SCNC: 25 MMOL/L — SIGNIFICANT CHANGE UP (ref 22–31)
CREAT SERPL-MCNC: 0.85 MG/DL — SIGNIFICANT CHANGE UP (ref 0.5–1.3)
CRP SERPL-MCNC: 12.07 MG/DL — HIGH (ref 0–0.4)
CULTURE RESULTS: SIGNIFICANT CHANGE UP
CULTURE RESULTS: SIGNIFICANT CHANGE UP
ERYTHROCYTE [SEDIMENTATION RATE] IN BLOOD: 60 MM/HR — HIGH
GLUCOSE BLDC GLUCOMTR-MCNC: 215 MG/DL — HIGH (ref 70–99)
GLUCOSE BLDC GLUCOMTR-MCNC: 243 MG/DL — HIGH (ref 70–99)
GLUCOSE BLDC GLUCOMTR-MCNC: 249 MG/DL — HIGH (ref 70–99)
GLUCOSE BLDC GLUCOMTR-MCNC: 269 MG/DL — HIGH (ref 70–99)
GLUCOSE SERPL-MCNC: 254 MG/DL — HIGH (ref 70–99)
HCT VFR BLD CALC: 26.7 % — LOW (ref 39–50)
HGB BLD-MCNC: 8.3 G/DL — LOW (ref 13–17)
MAGNESIUM SERPL-MCNC: 1.9 MG/DL — SIGNIFICANT CHANGE UP (ref 1.6–2.6)
MCHC RBC-ENTMCNC: 24 PG — LOW (ref 27–34)
MCHC RBC-ENTMCNC: 31.1 GM/DL — LOW (ref 32–36)
MCV RBC AUTO: 77.2 FL — LOW (ref 80–100)
NRBC # BLD: 0 /100 WBCS — SIGNIFICANT CHANGE UP (ref 0–0)
PLATELET # BLD AUTO: 274 K/UL — SIGNIFICANT CHANGE UP (ref 150–400)
POTASSIUM SERPL-MCNC: 5 MMOL/L — SIGNIFICANT CHANGE UP (ref 3.5–5.3)
POTASSIUM SERPL-SCNC: 5 MMOL/L — SIGNIFICANT CHANGE UP (ref 3.5–5.3)
PROT SERPL-MCNC: 5.6 G/DL — LOW (ref 6–8.3)
RBC # BLD: 3.46 M/UL — LOW (ref 4.2–5.8)
RBC # FLD: 17.7 % — HIGH (ref 10.3–14.5)
SODIUM SERPL-SCNC: 132 MMOL/L — LOW (ref 135–145)
SPECIMEN SOURCE: SIGNIFICANT CHANGE UP
SPECIMEN SOURCE: SIGNIFICANT CHANGE UP
VANCOMYCIN TROUGH SERPL-MCNC: 15.2 UG/ML — SIGNIFICANT CHANGE UP (ref 10–20)
WBC # BLD: 14.16 K/UL — HIGH (ref 3.8–10.5)
WBC # FLD AUTO: 14.16 K/UL — HIGH (ref 3.8–10.5)

## 2020-07-24 PROCEDURE — 99232 SBSQ HOSP IP/OBS MODERATE 35: CPT

## 2020-07-24 PROCEDURE — 99232 SBSQ HOSP IP/OBS MODERATE 35: CPT | Mod: GC

## 2020-07-24 PROCEDURE — 99233 SBSQ HOSP IP/OBS HIGH 50: CPT

## 2020-07-24 RX ORDER — HYDROMORPHONE HYDROCHLORIDE 2 MG/ML
0.5 INJECTION INTRAMUSCULAR; INTRAVENOUS; SUBCUTANEOUS EVERY 4 HOURS
Refills: 0 | Status: DISCONTINUED | OUTPATIENT
Start: 2020-07-24 | End: 2020-07-25

## 2020-07-24 RX ORDER — INSULIN GLARGINE 100 [IU]/ML
16 INJECTION, SOLUTION SUBCUTANEOUS AT BEDTIME
Refills: 0 | Status: DISCONTINUED | OUTPATIENT
Start: 2020-07-24 | End: 2020-08-10

## 2020-07-24 RX ADMIN — INSULIN GLARGINE 16 UNIT(S): 100 INJECTION, SOLUTION SUBCUTANEOUS at 22:02

## 2020-07-24 RX ADMIN — HYDROMORPHONE HYDROCHLORIDE 0.5 MILLIGRAM(S): 2 INJECTION INTRAMUSCULAR; INTRAVENOUS; SUBCUTANEOUS at 18:55

## 2020-07-24 RX ADMIN — Medication 250 MILLIGRAM(S): at 01:57

## 2020-07-24 RX ADMIN — HYDROMORPHONE HYDROCHLORIDE 0.5 MILLIGRAM(S): 2 INJECTION INTRAMUSCULAR; INTRAVENOUS; SUBCUTANEOUS at 11:38

## 2020-07-24 RX ADMIN — DULOXETINE HYDROCHLORIDE 90 MILLIGRAM(S): 30 CAPSULE, DELAYED RELEASE ORAL at 13:13

## 2020-07-24 RX ADMIN — Medication 25 MILLIGRAM(S): at 05:32

## 2020-07-24 RX ADMIN — ATORVASTATIN CALCIUM 40 MILLIGRAM(S): 80 TABLET, FILM COATED ORAL at 22:01

## 2020-07-24 RX ADMIN — OXYCODONE HYDROCHLORIDE 10 MILLIGRAM(S): 5 TABLET ORAL at 10:40

## 2020-07-24 RX ADMIN — OXYCODONE HYDROCHLORIDE 10 MILLIGRAM(S): 5 TABLET ORAL at 14:54

## 2020-07-24 RX ADMIN — OXYCODONE HYDROCHLORIDE 10 MILLIGRAM(S): 5 TABLET ORAL at 09:43

## 2020-07-24 RX ADMIN — HEPARIN SODIUM 5000 UNIT(S): 5000 INJECTION INTRAVENOUS; SUBCUTANEOUS at 23:43

## 2020-07-24 RX ADMIN — HEPARIN SODIUM 5000 UNIT(S): 5000 INJECTION INTRAVENOUS; SUBCUTANEOUS at 07:22

## 2020-07-24 RX ADMIN — Medication 8 UNIT(S): at 12:39

## 2020-07-24 RX ADMIN — OXYCODONE HYDROCHLORIDE 5 MILLIGRAM(S): 5 TABLET ORAL at 23:53

## 2020-07-24 RX ADMIN — Medication 250 MILLIGRAM(S): at 15:47

## 2020-07-24 RX ADMIN — Medication 4: at 22:00

## 2020-07-24 RX ADMIN — CYCLOBENZAPRINE HYDROCHLORIDE 5 MILLIGRAM(S): 10 TABLET, FILM COATED ORAL at 02:08

## 2020-07-24 RX ADMIN — Medication 4: at 12:39

## 2020-07-24 RX ADMIN — Medication 0.12 MILLIGRAM(S): at 05:33

## 2020-07-24 RX ADMIN — PANTOPRAZOLE SODIUM 40 MILLIGRAM(S): 20 TABLET, DELAYED RELEASE ORAL at 07:22

## 2020-07-24 RX ADMIN — Medication 6: at 17:23

## 2020-07-24 RX ADMIN — Medication 8 UNIT(S): at 07:23

## 2020-07-24 RX ADMIN — CHLORHEXIDINE GLUCONATE 1 APPLICATION(S): 213 SOLUTION TOPICAL at 13:01

## 2020-07-24 RX ADMIN — SPIRONOLACTONE 25 MILLIGRAM(S): 25 TABLET, FILM COATED ORAL at 05:32

## 2020-07-24 RX ADMIN — Medication 8 UNIT(S): at 17:23

## 2020-07-24 RX ADMIN — PRASUGREL 10 MILLIGRAM(S): 5 TABLET, FILM COATED ORAL at 14:53

## 2020-07-24 RX ADMIN — Medication 81 MILLIGRAM(S): at 05:33

## 2020-07-24 RX ADMIN — POLYETHYLENE GLYCOL 3350 17 GRAM(S): 17 POWDER, FOR SOLUTION ORAL at 11:38

## 2020-07-24 RX ADMIN — Medication 81 MILLIGRAM(S): at 17:23

## 2020-07-24 RX ADMIN — OXYCODONE HYDROCHLORIDE 10 MILLIGRAM(S): 5 TABLET ORAL at 08:47

## 2020-07-24 RX ADMIN — HYDROMORPHONE HYDROCHLORIDE 0.5 MILLIGRAM(S): 2 INJECTION INTRAMUSCULAR; INTRAVENOUS; SUBCUTANEOUS at 19:25

## 2020-07-24 RX ADMIN — Medication 4: at 07:23

## 2020-07-24 RX ADMIN — CYCLOBENZAPRINE HYDROCHLORIDE 5 MILLIGRAM(S): 10 TABLET, FILM COATED ORAL at 11:14

## 2020-07-24 RX ADMIN — HYDROMORPHONE HYDROCHLORIDE 0.5 MILLIGRAM(S): 2 INJECTION INTRAMUSCULAR; INTRAVENOUS; SUBCUTANEOUS at 12:05

## 2020-07-24 RX ADMIN — HEPARIN SODIUM 5000 UNIT(S): 5000 INJECTION INTRAVENOUS; SUBCUTANEOUS at 15:46

## 2020-07-24 RX ADMIN — OXYCODONE HYDROCHLORIDE 10 MILLIGRAM(S): 5 TABLET ORAL at 15:56

## 2020-07-24 NOTE — PROGRESS NOTE ADULT - SUBJECTIVE AND OBJECTIVE BOX
Ortho Note    Pt comfortable without complaints, pain controlled  Denies CP, SOB, N/V, numbness/tingling     Vital Signs Last 24 Hrs  T(C): 36.9 (07-24-20 @ 09:44), Max: 36.9 (07-24-20 @ 09:44)  T(F): 98.4 (07-24-20 @ 09:44), Max: 98.4 (07-24-20 @ 09:44)  HR: 77 (07-24-20 @ 09:44) (77 - 77)  BP: 106/57 (07-24-20 @ 09:44) (106/57 - 106/57)  BP(mean): --  RR: 20 (07-24-20 @ 09:44) (20 - 20)  SpO2: 94% (07-24-20 @ 09:44) (94% - 94%)  AVSS    focused exam:  General: Pt Alert and oriented, NAD  DSG C/D/I  Pulses: feet warm; vascular status at baseline  Sensation: SILT BLE- baseline neuropathy- follows with vascular outpatient  Motor: 5/5 EHL/FHL/TA/GS                          8.3    14.16 )-----------( 274      ( 24 Jul 2020 07:02 )             26.7   24 Jul 2020 07:02    132    |  97     |  12     ----------------------------<  254    5.0     |  25     |  0.85     Phos  2.9       23 Jul 2020 06:04  Mg     1.9       24 Jul 2020 07:02    TPro  5.6    /  Alb  2.4    /  TBili  0.7    /  DBili  x      /  AST  13     /  ALT  20     /  AlkPhos  119    24 Jul 2020 07:02      A/P: 65yMale s/p right knee arthroscopic washout 7/17; right knee explant and abx spacer 7/22  - acute blood loss anemia r/t surgery - Hgb 8.2 today; s/p 1 unit PRBCs yesterday; asymptomatic. Transfuse 1 unit PRBCs for Hgb < 8.0  - LLE vascular ulcer- hydrogel / dressing chagnes qd by vascular; will f/u with Dr. Malloy outpatient  - continue home meds; appreciate medicine and cardiology recs  - appreciate ID recs; f/u 2pm vanc troph prior to today's dose; then prior to every 4th dose; PICC line Monday if labs stable and afebrile through weekend  - Pain Control  - DVT ppx: home regimen effient + ASA; heparin subq  - PT, WBS: TTWB RLE  - continue bowel regimen  - OOB for meals as tolerated, I/S  - dispo: pending PT progress and ESR/ CRP over weekend; will need PICC and IV abx/ infusion services for home    Ortho Pager [phone]

## 2020-07-24 NOTE — PROGRESS NOTE ADULT - SUBJECTIVE AND OBJECTIVE BOX
Patient was seen and examined at bedside. No acute event overnight. No complaints.      Vital Signs Last 24 Hrs  T(C): 37.1 (24 Jul 2020 05:00), Max: 37.7 (23 Jul 2020 14:16)  T(F): 98.7 (24 Jul 2020 05:00), Max: 99.9 (23 Jul 2020 17:20)  HR: 77 (24 Jul 2020 09:44) (75 - 87)  BP: 106/57 (24 Jul 2020 09:44) (65/46 - 117/61)  BP(mean): 67 (23 Jul 2020 18:00) (51 - 72)  RR: 20 (24 Jul 2020 09:44) (11 - 26)  SpO2: 94% (24 Jul 2020 09:44) (92% - 100%)    Physical Exam:  General: NAD, resting comfortably, sleepy  Pulmonary: normal resp effort  Cardiovascular: NSR, no murmurs  Abdominal: soft, ND/NT  Extremities: LLE no CCE, large wound [id number] in jae-lateral tibia site, 1cm deep with exposed muscle. No discharge No erythema, no swelling. RLE with prevena VAC functioning with good suction.   Neuro: A/O x 3, CNs II-XII grossly intact, normal sensation, no focal deficits  Pulses: R palpable PT, pop, fem - bi DP             L tri DP/PT, palp fem    Lines/drains/tubes:    I&O's Summary    23 Jul 2020 07:01  -  24 Jul 2020 07:00  --------------------------------------------------------  IN: 1050 mL / OUT: 700 mL / NET: 350 mL        LABS:                        8.3    14.16 )-----------( 274      ( 24 Jul 2020 07:02 )             26.7     07-24    132<L>  |  97  |  12  ----------------------------<  254<H>  5.0   |  25  |  0.85    Ca    8.2<L>      24 Jul 2020 07:02  Phos  2.9     07-23  Mg     1.9     07-24    TPro  5.6<L>  /  Alb  2.4<L>  /  TBili  0.7  /  DBili  x   /  AST  13  /  ALT  20  /  AlkPhos  119  07-24        CAPILLARY BLOOD GLUCOSE      POCT Blood Glucose.: 249 mg/dL (24 Jul 2020 07:18)  POCT Blood Glucose.: 204 mg/dL (23 Jul 2020 22:02)  POCT Blood Glucose.: 192 mg/dL (23 Jul 2020 16:19)    LIVER FUNCTIONS - ( 24 Jul 2020 07:02 )  Alb: 2.4 g/dL / Pro: 5.6 g/dL / ALK PHOS: 119 U/L / ALT: 20 U/L / AST: 13 U/L / GGT: x             RADIOLOGY & ADDITIONAL STUDIES:

## 2020-07-24 NOTE — DISCHARGE NOTE ADULT - VISION (WITH CORRECTIVE LENSES IF THE PATIENT USUALLY WEARS THEM):
Pt arrived from OR to SICU [id number]. Connected to ICU cardiac monitoring. Simple Face mask @ 6L. All VSS. Pt follows commands and nods head appropriately. Cardene infusing upon transfer @ 10 mg/hr to maintain MAP < 80. All orders received and implemented.    Skin note: No apparent skin breakdown noted to back of head, elbows, sacrum or heels. Sacral dressing changed. Heel foam dressings placed. Waffle mattress inflated. Call light placed within reach, side rails up X3, and bed in lowest position.    Normal vision: sees adequately in most situations; can see medication labels, newsprint

## 2020-07-24 NOTE — PROGRESS NOTE ADULT - SUBJECTIVE AND OBJECTIVE BOX
INTERVAL HISTORY:  c/o right leg pain. No chest pain or dyspnea	  Chart, [facility] [place] medications and allergies reviewed    MEDICATIONS:  MEDICATIONS  (STANDING):  aspirin enteric coated 81 milliGRAM(s) Oral two times a day  atorvastatin 40 milliGRAM(s) Oral at bedtime  chlorhexidine 2% Cloths 1 Application(s) Topical daily  dextrose 5%. 1000 milliLiter(s) (50 mL/Hr) IV Continuous <Continuous>  dextrose 50% Injectable 12.5 Gram(s) IV Push once  dextrose 50% Injectable 25 Gram(s) IV Push once  dextrose 50% Injectable 25 Gram(s) IV Push once  digoxin     Tablet 0.125 milliGRAM(s) Oral daily  DULoxetine 90 milliGRAM(s) Oral daily  heparin   Injectable 5000 Unit(s) SubCutaneous every 8 hours  insulin glargine Injectable (LANTUS) 12 Unit(s) SubCutaneous at bedtime  insulin lispro (HumaLOG) corrective regimen sliding scale   SubCutaneous Before meals and at bedtime  insulin lispro Injectable (HumaLOG) 8 Unit(s) SubCutaneous three times a day before meals  lactated ringers. 1000 milliLiter(s) (80 mL/Hr) IV Continuous <Continuous>  metoprolol succinate ER 25 milliGRAM(s) Oral daily  pantoprazole    Tablet 40 milliGRAM(s) Oral before breakfast  polyethylene glycol 3350 17 Gram(s) Oral daily  prasugrel 10 milliGRAM(s) Oral daily  rifAMPin 300 milliGRAM(s) Oral every 12 hours  spironolactone 25 milliGRAM(s) Oral daily  tamsulosin 0.4 milliGRAM(s) Oral at bedtime  vancomycin  IVPB 1000 milliGRAM(s) IV Intermittent every 12 hours      REVIEW OF SYSTEMS:  CONSTITUTIONAL: No fever, no weight loss, no fatigue  PULMONARY: No cough, no wheezing, chills no hemoptysis; No Shortness of Breath  CARDIOVASCULAR: No chest pain, no palpitations, no syncope, dizziness, no leg swelling  GASTROINTESTINAL: No abdominal pain. No nausea, no  vomiting, no hematemesis; No diarrhea, no constipation. No melena, no hematochezia.  GENITOURINARY: No dysuria, no frequency, no hematuria, no incontinence  SKIN: No itching, no burning, no rashes, no lesions     PHYSICAL EXAM:  T(C): 37.1 (07-24-20 @ 05:00), Max: 37.7 (07-23-20 @ 14:16)  HR: 81 (07-24-20 @ 05:00) (75 - 87)  BP: 108/58 (07-24-20 @ 05:00) (65/46 - 117/61)  RR: 20 (07-24-20 @ 05:00) (11 - 26)  SpO2: 95% (07-24-20 @ 05:00) (92% - 100%)  Wt(kg): --  I&O's Summary    23 Jul 2020 07:01  -  24 Jul 2020 07:00  --------------------------------------------------------  IN: 1050 mL / OUT: 700 mL / NET: 350 mL        Appearance:  No deformities, normal appearance	  HEENT:   Normal oral mucosa, PERRL, EOMI	  Neck: No jvd , no masses  Lymphatic: No  lymphadenopathy  Pulmonary: Lungs clear to auscultation and percussion  Cardiovascular: Normal S1 S2, No JVD, No murmur, No edema	  Abdomen:  Soft, Non-tender, + BS  Skin: No rashes, No ecchymoses	  Extremities:  No clubbing or cyanosis  MSK: R leg swelling    LABS:		  CARDIAC MARKERS:                         8.3    14.16 )-----------( 274      ( 24 Jul 2020 07:02 )             26.7   07-24    132<L>  |  97  |  12  ----------------------------<  254<H>  5.0   |  25  |  0.85    Ca    8.2<L>      24 Jul 2020 07:02  Phos  2.9     07-23  Mg     1.9     07-24    TPro  5.6<L>  /  Alb  2.4<L>  /  TBili  0.7  /  DBili  x   /  AST  13  /  ALT  20  /  AlkPhos  119  07-24    proBNP:  Lipid Profile:  HgA1c:  TSH:      Culture - Acid Fast - Tissue w/Smear (collected 07-22-20 @ 22:11)  Source: .Tissue Right Knee Synovium    Culture - Fungal, Tissue (collected 07-22-20 @ 22:11)  Source: .Tissue Right Knee Synovium  Preliminary Report (07-23-20 @ 08:10):    Testing in progress    Culture - Tissue with Gram Stain (collected 07-22-20 @ 16:04)  Source: .Tissue Right Knee Synovium  Gram Stain (07-22-20 @ 18:55):    No organisms seen    Rare WBC's  Preliminary Report (07-23-20 @ 12:22):    No growth to date.    Culture - Blood (collected 07-19-20 @ 15:11)  Source: .Blood Blood-Peripheral  Preliminary Report (07-23-20 @ 16:00):    No growth at 4 days.    Culture - Blood (collected 07-19-20 @ 15:11)  Source: .Blood Blood-Peripheral  Preliminary Report (07-23-20 @ 16:00):    No growth at 4 days.      TELEMETRY: a sense v pace	      QTC     ECG:  	   QTC         RADIOLOGY:   DIAGNOSTIC TESTING: [ ] Echocardiogram: [ ]  Catheterization: [ ] Stress Test:      ASSESSMENT/PLAN: 	  Hypotension- BP is improved today. Recovering post op.    MRSA sepsis- patient is at risk for infected leads. JOHN PAUL is negative. Defibrillator pocket looks good.  Repeat blood cultures are negative so far.     Severely reduced ejection fraction-the patient takes diuretics occasionally for weight gain. He is sedentary however denies dyspnea. His pulmonary pressures are high on echo. BNP is elevated but chest x-ray is clear. Rx;d with  metoprolol 25mg daily and digoxin. Started on spironolactone 25 daily.    Coronary artery disease-the patient denies chest discomfort. His EKG is uninterpretable secondary to the pacemaker. He was revascularized less than two years ago. There is no clinical evidence for ischemia. Rx;d with aspirin and atorvastatin.  Rx'd with  prasugrel with history of stent thrombosis.     Defibrillator-no arrhythmia.      Discussed with house staff.

## 2020-07-24 NOTE — PROGRESS NOTE ADULT - SUBJECTIVE AND OBJECTIVE BOX
INTERVAL HPI/OVERNIGHT EVENTS:    Patient seen and examined at bedside.  Has pain in right knee.  Working with PT    CONSTITUTIONAL:  Negative fever or chills, feels well, good appetite  EYES:  Negative  blurry vision or double vision  CARDIOVASCULAR:  Negative for chest pain or palpitations  RESPIRATORY:  Negative for cough, wheezing, or SOB   GASTROINTESTINAL:  Negative for nausea, vomiting, diarrhea, constipation, or abdominal pain  GENITOURINARY:  Negative frequency, urgency or dysuria  NEUROLOGIC:  No headache, confusion, dizziness, lightheadedness      ANTIBIOTICS/RELEVANT:    MEDICATIONS  (STANDING):  aspirin enteric coated 81 milliGRAM(s) Oral two times a day  atorvastatin 40 milliGRAM(s) Oral at bedtime  chlorhexidine 2% Cloths 1 Application(s) Topical daily  dextrose 5%. 1000 milliLiter(s) (50 mL/Hr) IV Continuous <Continuous>  dextrose 50% Injectable 12.5 Gram(s) IV Push once  dextrose 50% Injectable 25 Gram(s) IV Push once  dextrose 50% Injectable 25 Gram(s) IV Push once  digoxin     Tablet 0.125 milliGRAM(s) Oral daily  DULoxetine 90 milliGRAM(s) Oral daily  heparin   Injectable 5000 Unit(s) SubCutaneous every 8 hours  insulin glargine Injectable (LANTUS) 12 Unit(s) SubCutaneous at bedtime  insulin lispro (HumaLOG) corrective regimen sliding scale   SubCutaneous Before meals and at bedtime  insulin lispro Injectable (HumaLOG) 8 Unit(s) SubCutaneous three times a day before meals  lactated ringers. 1000 milliLiter(s) (80 mL/Hr) IV Continuous <Continuous>  metoprolol succinate ER 25 milliGRAM(s) Oral daily  pantoprazole    Tablet 40 milliGRAM(s) Oral before breakfast  polyethylene glycol 3350 17 Gram(s) Oral daily  prasugrel 10 milliGRAM(s) Oral daily  rifAMPin 300 milliGRAM(s) Oral every 12 hours  spironolactone 25 milliGRAM(s) Oral daily  tamsulosin 0.4 milliGRAM(s) Oral at bedtime  vancomycin  IVPB 1000 milliGRAM(s) IV Intermittent every 12 hours    MEDICATIONS  (PRN):  acetaminophen   Tablet .. 975 milliGRAM(s) Oral every 8 hours PRN Temp greater or equal to 38C (100.4F), Mild Pain (1 - 3)  aluminum hydroxide/magnesium hydroxide/simethicone Suspension 30 milliLiter(s) Oral four times a day PRN Indigestion  bisacodyl Suppository 10 milliGRAM(s) Rectal daily PRN If no bowel movement by POD#2  cyclobenzaprine 5 milliGRAM(s) Oral three times a day PRN Muscle Spasm  dextrose 40% Gel 15 Gram(s) Oral once PRN Blood Glucose LESS THAN 70 milliGRAM(s)/deciliter  glucagon  Injectable 1 milliGRAM(s) IntraMuscular once PRN Glucose LESS THAN 70 milligrams/deciliter  magnesium hydroxide Suspension 30 milliLiter(s) Oral daily PRN Constipation  ondansetron Injectable 4 milliGRAM(s) IV Push every 6 hours PRN Nausea and/or Vomiting  oxyCODONE    IR 5 milliGRAM(s) Oral every 4 hours PRN Moderate Pain (4 - 6)  oxyCODONE    IR 10 milliGRAM(s) Oral every 4 hours PRN Severe Pain (7 - 10)  senna 2 Tablet(s) Oral at bedtime PRN Constipation        Vital Signs Last 24 Hrs  T(C): 37.1 (24 Jul 2020 05:00), Max: 37.7 (23 Jul 2020 14:16)  T(F): 98.7 (24 Jul 2020 05:00), Max: 99.9 (23 Jul 2020 17:20)  HR: 77 (24 Jul 2020 09:44) (75 - 87)  BP: 106/57 (24 Jul 2020 09:44) (65/46 - 117/61)  BP(mean): 67 (23 Jul 2020 18:00) (51 - 72)  RR: 20 (24 Jul 2020 09:44) (11 - 26)  SpO2: 94% (24 Jul 2020 09:44) (92% - 100%)    PHYSICAL EXAM:  Constitutional:  non-toxic, no distress  Eyes:  no icterus  Ear/Nose/Throat: no oral lesion   Neck:  supple  Respiratory: CTA colton  Cardiovascular: S1S2 RRR, no murmurs  Gastrointestinal:soft, (+) BS, no HSM  Extremities:  right knee in brace  Vascular: DP Pulse:	right normal; left normal      LABS:                        8.3    14.16 )-----------( 274      ( 24 Jul 2020 07:02 )             26.7     07-24    132<L>  |  97  |  12  ----------------------------<  254<H>  5.0   |  25  |  0.85    Ca    8.2<L>      24 Jul 2020 07:02  Phos  2.9     07-23  Mg     1.9     07-24    TPro  5.6<L>  /  Alb  2.4<L>  /  TBili  0.7  /  DBili  x   /  AST  13  /  ALT  20  /  AlkPhos  119  07-24          MICROBIOLOGY:    RADIOLOGY & ADDITIONAL STUDIES:

## 2020-07-24 NOTE — PROGRESS NOTE ADULT - SUBJECTIVE AND OBJECTIVE BOX
Ortho Progress Note    Procedure: R TKA explant, antibiotic spacer  Surgeon: Oh    Pt comfortable without complaints, pain controlled. Denies CP, SOB, N/V, numbness/tingling     Vital Signs Last 24 Hrs  T(C): 37.1 (24 Jul 2020 05:00), Max: 37.7 (23 Jul 2020 14:16)  T(F): 98.7 (24 Jul 2020 05:00), Max: 99.9 (23 Jul 2020 17:20)  HR: 81 (24 Jul 2020 05:00) (75 - 87)  BP: 108/58 (24 Jul 2020 05:00) (65/46 - 117/61)  BP(mean): 67 (23 Jul 2020 18:00) (51 - 75)  RR: 20 (24 Jul 2020 05:00) (11 - 26)  SpO2: 95% (24 Jul 2020 05:00) (89% - 100%)    Physical Exam:  General: Resting comfortably in bed. AAOx3. NAD.  Extremities:        LLE: Dressing in place LLE, diabetic ulcer being managed by vascular team. SILT L2-S1 distribution, symmetric. TA/EHL/FHL/GS motor intact. Feet cool b/l       RLE: Prevena in place with good function. SILT L2-S1 distribution, symmetric. TA/EHL/FHL/GS motor intact. Feet cool b/l, cap refill <3 sec                            8.8    14.00 )-----------( 283      ( 23 Jul 2020 15:11 )             29.2       A/P: 65yMale s/p R total knee explant, antibiotic spacer 7/22  - Stable  - Pain Control  - DVT ppx: ASA/Effient  - Appreciate ID recs: on Vanco, rifampin, will need PICC line 48hr post op  - PT, WBS: TTWB RLE  - Dispo: pending ID plan and clearance from PT    Ortho Pager [phone]

## 2020-07-24 NOTE — PROGRESS NOTE ADULT - SUBJECTIVE AND OBJECTIVE BOX
Interval Events: Reviewed  Patient seen and examined at bedside.    Patient is a 65y old  Male who presents with a chief complaint of right knee infection (24 Jul 2020 11:13)    he is doing well except for the pain  PAST MEDICAL & SURGICAL HISTORY:  Diabetic neuropathy  STEMI (ST elevation myocardial infarction)  Diverticulitis  MRSA bacteremia  History of celiac disease  CHF (congestive heart failure): EF ~ 25%  HTN (hypertension)  Diabetes: on insulin pump  Blood clot due to device, implant, or graft: was on blood thinners  HLD (hyperlipidemia)  Osteoarthritis  Atherosclerosis of coronary artery: CAD (coronary artery disease)  Status post percutaneous transluminal coronary angioplasty: in 2012  History of open reduction and internal fixation (ORIF) procedure  Surgery, elective: Right shoulder  Surgery, elective: right knee wound debridement  S/P TKR (total knee replacement), right: with infection Mrsa   per pt he was cleared from MRSA infection  S/P CABG x 1: 2018  Stented coronary artery: 10/18 heart attack  INFERIOR WALL MI  Other postprocedural status: Fixation hardware in foot LEFT      MEDICATIONS:  Pulmonary:    Antimicrobials:  rifAMPin 300 milliGRAM(s) Oral every 12 hours  vancomycin  IVPB 1000 milliGRAM(s) IV Intermittent every 12 hours    Anticoagulants:  aspirin enteric coated 81 milliGRAM(s) Oral two times a day  heparin   Injectable 5000 Unit(s) SubCutaneous every 8 hours  prasugrel 10 milliGRAM(s) Oral daily    Cardiac:  digoxin     Tablet 0.125 milliGRAM(s) Oral daily  metoprolol succinate ER 25 milliGRAM(s) Oral daily  spironolactone 25 milliGRAM(s) Oral daily  tamsulosin 0.4 milliGRAM(s) Oral at bedtime      Allergies    ACE inhibitors (Hives)  carvedilol (Other)  enalapril (Hives)  Entresto (Other)    Intolerances        Vital Signs Last 24 Hrs  T(C): 37.1 (24 Jul 2020 05:00), Max: 37.7 (23 Jul 2020 14:16)  T(F): 98.7 (24 Jul 2020 05:00), Max: 99.9 (23 Jul 2020 17:20)  HR: 77 (24 Jul 2020 09:44) (75 - 87)  BP: 106/57 (24 Jul 2020 09:44) (65/46 - 117/61)  BP(mean): 67 (23 Jul 2020 18:00) (51 - 72)  RR: 20 (24 Jul 2020 09:44) (11 - 26)  SpO2: 94% (24 Jul 2020 09:44) (92% - 100%)    07-23 @ 07:01  -  07-24 @ 07:00  --------------------------------------------------------  IN: 1050 mL / OUT: 700 mL / NET: 350 mL          Review of Systems:   •	General: negative  •	Skin/Breast: negative  •	Ophthalmologic: negative  •	ENMT: negative  •	Respiratory and Thorax: negative  •	Cardiovascular: negative  •	Gastrointestinal: negative  •	Genitourinary: negative  •	Musculoskeletal: negative  •	Neurological: negative  •	Psychiatric: negative  •	Hematology/Lymphatics: negative  •	Endocrine: negative  •	Allergic/Immunologic: negative    Physical Exam:   • Constitutional:	Well-developed, well nourished  • Eyes:	EOMI; PERRL; no drainage or redness  • ENMT:	No oral lesions; no gross abnormalities  • Neck	No bruits; no thyromegaly or nodules  • Breasts:	not examined  • Back:	No deformity or limitation of movement  • Respiratory:	Breath Sounds equal & clear to percussion & auscultation, no accessory muscle use  • Cardiovascular:	Regular rate & rhythm, normal S1, S2; no murmurs, gallops or rubs; no S3, S4  • Gastrointestinal:	Soft, non-tender, no hepatosplenomegaly, normal bowel sounds  • Genitourinary:	not examined  • Rectal: not examined  • Extremities:	No cyanosis, clubbing or edema  • Vascular:	Equal and normal pulses (carotid, femoral, dorsalis pedis)  • Neurologica:l	not examined  • Skin:	No lesions; no rash  • Lymph Nodes:	No lymphadedenopathy  • Musculoskeletal:	No joint pain, swelling or deformity; no limitation of movement        LABS:  ABG - ( 22 Jul 2020 19:49 )  pH, Arterial: 7.37  pH, Blood: x     /  pCO2: 43    /  pO2: 70    / HCO3: 24    / Base Excess: -1.2  /  SaO2: 92                  CBC Full  -  ( 24 Jul 2020 07:02 )  WBC Count : 14.16 K/uL  RBC Count : 3.46 M/uL  Hemoglobin : 8.3 g/dL  Hematocrit : 26.7 %  Platelet Count - Automated : 274 K/uL  Mean Cell Volume : 77.2 fl  Mean Cell Hemoglobin : 24.0 pg  Mean Cell Hemoglobin Concentration : 31.1 gm/dL  Auto Neutrophil # : x  Auto Lymphocyte # : x  Auto Monocyte # : x  Auto Eosinophil # : x  Auto Basophil # : x  Auto Neutrophil % : x  Auto Lymphocyte % : x  Auto Monocyte % : x  Auto Eosinophil % : x  Auto Basophil % : x    07-24    132<L>  |  97  |  12  ----------------------------<  254<H>  5.0   |  25  |  0.85    Ca    8.2<L>      24 Jul 2020 07:02  Phos  2.9     07-23  Mg     1.9     07-24    TPro  5.6<L>  /  Alb  2.4<L>  /  TBili  0.7  /  DBili  x   /  AST  13  /  ALT  20  /  AlkPhos  119  07-24    Culture - Tissue with Gram Stain (07.22.20 @ 16:04)    Gram Stain:   No organisms seen  Rare WBC's    Specimen Source: .Tissue Right Knee Synovium    Culture Results:   No growth to date.                    Culture Results:   Testing in progress (07-22 @ 22:11)  Culture Results:   No growth to date. (07-22 @ 16:04)      RADIOLOGY & ADDITIONAL STUDIES (The following images were personally reviewed):  Loja:                                     No  Urine output:                       adequate  DVT prophylaxis:                 Yes  Flattus:                                  Yes  Bowel movement:              No

## 2020-07-25 LAB
ANION GAP SERPL CALC-SCNC: 10 MMOL/L — SIGNIFICANT CHANGE UP (ref 5–17)
BUN SERPL-MCNC: 13 MG/DL — SIGNIFICANT CHANGE UP (ref 7–23)
CALCIUM SERPL-MCNC: 8.2 MG/DL — LOW (ref 8.4–10.5)
CHLORIDE SERPL-SCNC: 98 MMOL/L — SIGNIFICANT CHANGE UP (ref 96–108)
CO2 SERPL-SCNC: 24 MMOL/L — SIGNIFICANT CHANGE UP (ref 22–31)
CREAT SERPL-MCNC: 0.78 MG/DL — SIGNIFICANT CHANGE UP (ref 0.5–1.3)
GLUCOSE BLDC GLUCOMTR-MCNC: 214 MG/DL — HIGH (ref 70–99)
GLUCOSE BLDC GLUCOMTR-MCNC: 214 MG/DL — HIGH (ref 70–99)
GLUCOSE BLDC GLUCOMTR-MCNC: 263 MG/DL — HIGH (ref 70–99)
GLUCOSE BLDC GLUCOMTR-MCNC: 309 MG/DL — HIGH (ref 70–99)
GLUCOSE SERPL-MCNC: 216 MG/DL — HIGH (ref 70–99)
HCT VFR BLD CALC: 26.8 % — LOW (ref 39–50)
HGB BLD-MCNC: 8.4 G/DL — LOW (ref 13–17)
MCHC RBC-ENTMCNC: 24.2 PG — LOW (ref 27–34)
MCHC RBC-ENTMCNC: 31.3 GM/DL — LOW (ref 32–36)
MCV RBC AUTO: 77.2 FL — LOW (ref 80–100)
NRBC # BLD: 0 /100 WBCS — SIGNIFICANT CHANGE UP (ref 0–0)
PLATELET # BLD AUTO: 308 K/UL — SIGNIFICANT CHANGE UP (ref 150–400)
POTASSIUM SERPL-MCNC: 4.2 MMOL/L — SIGNIFICANT CHANGE UP (ref 3.5–5.3)
POTASSIUM SERPL-SCNC: 4.2 MMOL/L — SIGNIFICANT CHANGE UP (ref 3.5–5.3)
RBC # BLD: 3.47 M/UL — LOW (ref 4.2–5.8)
RBC # FLD: 18.2 % — HIGH (ref 10.3–14.5)
SODIUM SERPL-SCNC: 132 MMOL/L — LOW (ref 135–145)
WBC # BLD: 13.85 K/UL — HIGH (ref 3.8–10.5)
WBC # FLD AUTO: 13.85 K/UL — HIGH (ref 3.8–10.5)

## 2020-07-25 PROCEDURE — 99232 SBSQ HOSP IP/OBS MODERATE 35: CPT

## 2020-07-25 RX ORDER — HYDROMORPHONE HYDROCHLORIDE 2 MG/ML
1 INJECTION INTRAMUSCULAR; INTRAVENOUS; SUBCUTANEOUS EVERY 4 HOURS
Refills: 0 | Status: DISCONTINUED | OUTPATIENT
Start: 2020-07-25 | End: 2020-07-31

## 2020-07-25 RX ORDER — OXYCODONE HYDROCHLORIDE 5 MG/1
10 TABLET ORAL EVERY 12 HOURS
Refills: 0 | Status: DISCONTINUED | OUTPATIENT
Start: 2020-07-25 | End: 2020-07-31

## 2020-07-25 RX ORDER — CYCLOBENZAPRINE HYDROCHLORIDE 10 MG/1
10 TABLET, FILM COATED ORAL THREE TIMES A DAY
Refills: 0 | Status: DISCONTINUED | OUTPATIENT
Start: 2020-07-25 | End: 2020-08-10

## 2020-07-25 RX ORDER — OXYCODONE HYDROCHLORIDE 5 MG/1
5 TABLET ORAL
Refills: 0 | Status: DISCONTINUED | OUTPATIENT
Start: 2020-07-25 | End: 2020-07-31

## 2020-07-25 RX ORDER — MORPHINE SULFATE 50 MG/1
4 CAPSULE, EXTENDED RELEASE ORAL
Refills: 0 | Status: DISCONTINUED | OUTPATIENT
Start: 2020-07-25 | End: 2020-07-31

## 2020-07-25 RX ORDER — ACETAMINOPHEN 500 MG
975 TABLET ORAL EVERY 8 HOURS
Refills: 0 | Status: DISCONTINUED | OUTPATIENT
Start: 2020-07-25 | End: 2020-08-10

## 2020-07-25 RX ORDER — OXYCODONE HYDROCHLORIDE 5 MG/1
10 TABLET ORAL
Refills: 0 | Status: DISCONTINUED | OUTPATIENT
Start: 2020-07-25 | End: 2020-07-31

## 2020-07-25 RX ORDER — SODIUM CHLORIDE 9 MG/ML
1000 INJECTION, SOLUTION INTRAVENOUS
Refills: 0 | Status: DISCONTINUED | OUTPATIENT
Start: 2020-07-26 | End: 2020-07-26

## 2020-07-25 RX ADMIN — CHLORHEXIDINE GLUCONATE 1 APPLICATION(S): 213 SOLUTION TOPICAL at 12:30

## 2020-07-25 RX ADMIN — ATORVASTATIN CALCIUM 40 MILLIGRAM(S): 80 TABLET, FILM COATED ORAL at 22:29

## 2020-07-25 RX ADMIN — OXYCODONE HYDROCHLORIDE 10 MILLIGRAM(S): 5 TABLET ORAL at 13:36

## 2020-07-25 RX ADMIN — OXYCODONE HYDROCHLORIDE 10 MILLIGRAM(S): 5 TABLET ORAL at 19:13

## 2020-07-25 RX ADMIN — Medication 975 MILLIGRAM(S): at 23:00

## 2020-07-25 RX ADMIN — HYDROMORPHONE HYDROCHLORIDE 0.5 MILLIGRAM(S): 2 INJECTION INTRAMUSCULAR; INTRAVENOUS; SUBCUTANEOUS at 04:05

## 2020-07-25 RX ADMIN — POLYETHYLENE GLYCOL 3350 17 GRAM(S): 17 POWDER, FOR SOLUTION ORAL at 12:35

## 2020-07-25 RX ADMIN — PANTOPRAZOLE SODIUM 40 MILLIGRAM(S): 20 TABLET, DELAYED RELEASE ORAL at 07:00

## 2020-07-25 RX ADMIN — MORPHINE SULFATE 4 MILLIGRAM(S): 50 CAPSULE, EXTENDED RELEASE ORAL at 16:49

## 2020-07-25 RX ADMIN — Medication 4: at 08:53

## 2020-07-25 RX ADMIN — CYCLOBENZAPRINE HYDROCHLORIDE 10 MILLIGRAM(S): 10 TABLET, FILM COATED ORAL at 10:28

## 2020-07-25 RX ADMIN — Medication 0.12 MILLIGRAM(S): at 05:00

## 2020-07-25 RX ADMIN — Medication 8 UNIT(S): at 18:34

## 2020-07-25 RX ADMIN — Medication 8: at 18:34

## 2020-07-25 RX ADMIN — OXYCODONE HYDROCHLORIDE 10 MILLIGRAM(S): 5 TABLET ORAL at 07:19

## 2020-07-25 RX ADMIN — Medication 975 MILLIGRAM(S): at 22:29

## 2020-07-25 RX ADMIN — Medication 250 MILLIGRAM(S): at 02:15

## 2020-07-25 RX ADMIN — OXYCODONE HYDROCHLORIDE 10 MILLIGRAM(S): 5 TABLET ORAL at 08:19

## 2020-07-25 RX ADMIN — HYDROMORPHONE HYDROCHLORIDE 0.5 MILLIGRAM(S): 2 INJECTION INTRAMUSCULAR; INTRAVENOUS; SUBCUTANEOUS at 04:56

## 2020-07-25 RX ADMIN — HEPARIN SODIUM 5000 UNIT(S): 5000 INJECTION INTRAVENOUS; SUBCUTANEOUS at 15:31

## 2020-07-25 RX ADMIN — Medication 81 MILLIGRAM(S): at 18:13

## 2020-07-25 RX ADMIN — Medication 250 MILLIGRAM(S): at 15:30

## 2020-07-25 RX ADMIN — Medication 6: at 22:28

## 2020-07-25 RX ADMIN — DULOXETINE HYDROCHLORIDE 90 MILLIGRAM(S): 30 CAPSULE, DELAYED RELEASE ORAL at 12:35

## 2020-07-25 RX ADMIN — Medication 25 MILLIGRAM(S): at 05:01

## 2020-07-25 RX ADMIN — Medication 975 MILLIGRAM(S): at 15:30

## 2020-07-25 RX ADMIN — OXYCODONE HYDROCHLORIDE 10 MILLIGRAM(S): 5 TABLET ORAL at 18:13

## 2020-07-25 RX ADMIN — HEPARIN SODIUM 5000 UNIT(S): 5000 INJECTION INTRAVENOUS; SUBCUTANEOUS at 22:35

## 2020-07-25 RX ADMIN — OXYCODONE HYDROCHLORIDE 5 MILLIGRAM(S): 5 TABLET ORAL at 01:03

## 2020-07-25 RX ADMIN — HEPARIN SODIUM 5000 UNIT(S): 5000 INJECTION INTRAVENOUS; SUBCUTANEOUS at 07:00

## 2020-07-25 RX ADMIN — PRASUGREL 10 MILLIGRAM(S): 5 TABLET, FILM COATED ORAL at 12:35

## 2020-07-25 RX ADMIN — Medication 8 UNIT(S): at 12:34

## 2020-07-25 RX ADMIN — MORPHINE SULFATE 4 MILLIGRAM(S): 50 CAPSULE, EXTENDED RELEASE ORAL at 15:49

## 2020-07-25 RX ADMIN — Medication 8 UNIT(S): at 08:53

## 2020-07-25 RX ADMIN — SPIRONOLACTONE 25 MILLIGRAM(S): 25 TABLET, FILM COATED ORAL at 05:02

## 2020-07-25 RX ADMIN — Medication 4: at 12:34

## 2020-07-25 RX ADMIN — INSULIN GLARGINE 16 UNIT(S): 100 INJECTION, SOLUTION SUBCUTANEOUS at 22:27

## 2020-07-25 RX ADMIN — Medication 975 MILLIGRAM(S): at 16:00

## 2020-07-25 RX ADMIN — Medication 81 MILLIGRAM(S): at 05:00

## 2020-07-25 RX ADMIN — OXYCODONE HYDROCHLORIDE 10 MILLIGRAM(S): 5 TABLET ORAL at 12:36

## 2020-07-25 NOTE — PROGRESS NOTE ADULT - SUBJECTIVE AND OBJECTIVE BOX
INFECTIOUS DISEASES CONSULT FOLLOW-UP NOTE    INTERVAL HPI/OVERNIGHT EVENTS:  no event overnight  patient c/o knee pain in the evening, difficulty sleeping because of that  currently more controlled      CONSTITUTIONAL:  Negative fever or chills, feels well, good appetite  EYES:  Negative  blurry vision or double vision  CARDIOVASCULAR:  Negative for chest pain or palpitations  RESPIRATORY:  Negative for cough, wheezing, or SOB   GASTROINTESTINAL:  Negative for nausea, vomiting, diarrhea, constipation, or abdominal pain  GENITOURINARY:  Negative frequency, urgency or dysuria  NEUROLOGIC:  No headache, confusion, dizziness, lightheadedness      ANTIBIOTICS/RELEVANT:    MEDICATIONS  (STANDING):  acetaminophen   Tablet .. 975 milliGRAM(s) Oral every 8 hours  aspirin enteric coated 81 milliGRAM(s) Oral two times a day  atorvastatin 40 milliGRAM(s) Oral at bedtime  chlorhexidine 2% Cloths 1 Application(s) Topical daily  dextrose 5%. 1000 milliLiter(s) (50 mL/Hr) IV Continuous <Continuous>  dextrose 50% Injectable 12.5 Gram(s) IV Push once  dextrose 50% Injectable 25 Gram(s) IV Push once  dextrose 50% Injectable 25 Gram(s) IV Push once  digoxin     Tablet 0.125 milliGRAM(s) Oral daily  DULoxetine 90 milliGRAM(s) Oral daily  heparin   Injectable 5000 Unit(s) SubCutaneous every 8 hours  insulin glargine Injectable (LANTUS) 16 Unit(s) SubCutaneous at bedtime  insulin lispro (HumaLOG) corrective regimen sliding scale   SubCutaneous Before meals and at bedtime  insulin lispro Injectable (HumaLOG) 8 Unit(s) SubCutaneous three times a day before meals  metoprolol succinate ER 25 milliGRAM(s) Oral daily  oxyCODONE  ER Tablet 10 milliGRAM(s) Oral every 12 hours  pantoprazole    Tablet 40 milliGRAM(s) Oral before breakfast  polyethylene glycol 3350 17 Gram(s) Oral daily  prasugrel 10 milliGRAM(s) Oral daily  rifAMPin 300 milliGRAM(s) Oral every 12 hours  spironolactone 25 milliGRAM(s) Oral daily  tamsulosin 0.4 milliGRAM(s) Oral at bedtime  vancomycin  IVPB 1000 milliGRAM(s) IV Intermittent every 12 hours    MEDICATIONS  (PRN):  aluminum hydroxide/magnesium hydroxide/simethicone Suspension 30 milliLiter(s) Oral four times a day PRN Indigestion  bisacodyl Suppository 10 milliGRAM(s) Rectal daily PRN If no bowel movement by POD#2  cyclobenzaprine 10 milliGRAM(s) Oral three times a day PRN Muscle Spasm  dextrose 40% Gel 15 Gram(s) Oral once PRN Blood Glucose LESS THAN 70 milliGRAM(s)/deciliter  glucagon  Injectable 1 milliGRAM(s) IntraMuscular once PRN Glucose LESS THAN 70 milligrams/deciliter  HYDROmorphone  Injectable 1 milliGRAM(s) IV Push every 4 hours PRN Breakthrough pain  magnesium hydroxide Suspension 30 milliLiter(s) Oral daily PRN Constipation  morphine  - Injectable 4 milliGRAM(s) IV Push every 3 hours PRN Severe Pain (7 - 10)  ondansetron Injectable 4 milliGRAM(s) IV Push every 6 hours PRN Nausea and/or Vomiting  oxyCODONE    IR 5 milliGRAM(s) Oral every 3 hours PRN Mild Pain (1 - 3)  oxyCODONE    IR 10 milliGRAM(s) Oral every 3 hours PRN Moderate Pain (4 - 6)  senna 2 Tablet(s) Oral at bedtime PRN Constipation        Vital Signs Last 24 Hrs  T(C): 36.9 (25 Jul 2020 18:12), Max: 37.1 (24 Jul 2020 20:53)  T(F): 98.4 (25 Jul 2020 18:12), Max: 98.7 (24 Jul 2020 20:53)  HR: 84 (25 Jul 2020 18:12) (84 - 88)  BP: 110/59 (25 Jul 2020 18:12) (110/59 - 131/74)  BP(mean): --  RR: 18 (25 Jul 2020 18:12) (17 - 20)  SpO2: 94% (25 Jul 2020 18:12) (93% - 97%)    07-24-20 @ 07:01  -  07-25-20 @ 07:00  --------------------------------------------------------  IN: 1550 mL / OUT: 800 mL / NET: 750 mL    07-25-20 @ 07:01  -  07-25-20 @ 19:55  --------------------------------------------------------  IN: 0 mL / OUT: 500 mL / NET: -500 mL      PHYSICAL EXAM:  Constitutional:  Well-developed, well nourished  Eyes:DAMARIS, EOMI  Ear/Nose/Throat: no oral lesion, no sinus tenderness on percussion	  Neck:no JVD, no lymphadenopathy, supple  Respiratory: CTA colton  Cardiovascular: S1S2 RRR, no murmurs  Gastrointestinal:soft, (+) BS, no HSM  Extremities: R knee covered with dressing   Vascular: DP Pulse:	right normal; left normal      LABS:                        8.4    13.85 )-----------( 308      ( 25 Jul 2020 07:46 )             26.8     07-25    132<L>  |  98  |  13  ----------------------------<  216<H>  4.2   |  24  |  0.78    Ca    8.2<L>      25 Jul 2020 07:46  Mg     1.9     07-24    TPro  5.6<L>  /  Alb  2.4<L>  /  TBili  0.7  /  DBili  x   /  AST  13  /  ALT  20  /  AlkPhos  119  07-24          MICROBIOLOGY:    Culture - Acid Fast - Tissue w/Smear (collected 22 Jul 2020 22:11)  Source: .Tissue Right Knee Synovium  Preliminary Report (25 Jul 2020 15:04):    Culture is being performed.    Culture - Fungal, Tissue (collected 22 Jul 2020 22:11)  Source: .Tissue Right Knee Synovium  Preliminary Report (23 Jul 2020 08:10):    Testing in progress        RADIOLOGY & ADDITIONAL STUDIES:  Knee X-ray reviewed

## 2020-07-25 NOTE — PROGRESS NOTE ADULT - SUBJECTIVE AND OBJECTIVE BOX
Interval Events: Reviewed  Patient seen and examined at bedside.    Patient is a 65y old  Male who presents with a chief complaint of right knee infection (24 Jul 2020 11:13)  pt admits to right leg pain and spasm overnight. no chest pain, no shortness of breath.       PAST MEDICAL & SURGICAL HISTORY:  Diabetic neuropathy  STEMI (ST elevation myocardial infarction)  Diverticulitis  MRSA bacteremia  History of celiac disease  CHF (congestive heart failure): EF ~ 25%  HTN (hypertension)  Diabetes: on insulin pump  Blood clot due to device, implant, or graft: was on blood thinners  HLD (hyperlipidemia)  Osteoarthritis  Atherosclerosis of coronary artery: CAD (coronary artery disease)  Status post percutaneous transluminal coronary angioplasty: in 2012  History of open reduction and internal fixation (ORIF) procedure  Surgery, elective: Right shoulder  Surgery, elective: right knee wound debridement  S/P TKR (total knee replacement), right: with infection Mrsa   per pt he was cleared from MRSA infection  S/P CABG x 1: 2018  Stented coronary artery: 10/18 heart attack  INFERIOR WALL MI  Other postprocedural status: Fixation hardware in foot LEFT      MEDICATIONS:  Pulmonary:    Antimicrobials:  rifAMPin 300 milliGRAM(s) Oral every 12 hours  vancomycin  IVPB 1000 milliGRAM(s) IV Intermittent every 12 hours    Anticoagulants:  aspirin enteric coated 81 milliGRAM(s) Oral two times a day  heparin   Injectable 5000 Unit(s) SubCutaneous every 8 hours  prasugrel 10 milliGRAM(s) Oral daily    Cardiac:  digoxin     Tablet 0.125 milliGRAM(s) Oral daily  metoprolol succinate ER 25 milliGRAM(s) Oral daily  spironolactone 25 milliGRAM(s) Oral daily  tamsulosin 0.4 milliGRAM(s) Oral at bedtime      Allergies    ACE inhibitors (Hives)  carvedilol (Other)  enalapril (Hives)  Entresto (Other)    Intolerances        Vital Signs Last 24 Hrs  T(C): 36.9 (25 Jul 2020 09:02), Max: 37.6 (24 Jul 2020 17:19)  T(F): 98.5 (25 Jul 2020 09:02), Max: 99.6 (24 Jul 2020 17:19)  HR: 87 (25 Jul 2020 09:02) (77 - 87)  BP: 121/63 (25 Jul 2020 09:02) (106/57 - 131/74)  BP(mean): --  RR: 18 (25 Jul 2020 09:02) (18 - 20)  SpO2: 97% (25 Jul 2020 09:02) (93% - 97%)    07-24 @ 07:01 - 07-25 @ 07:00  --------------------------------------------------------  IN: 1550 mL / OUT: 800 mL / NET: 750 mL    07-25 @ 07:01 - 07-25 @ 09:39  --------------------------------------------------------  IN: 0 mL / OUT: 100 mL / NET: -100 mL          Review of Systems:   •	General: negative  •	Skin/Breast: negative  •	Ophthalmologic: negative  •	ENMT: negative  •	Respiratory and Thorax: negative  •	Cardiovascular: negative  •	Gastrointestinal: negative  •	Genitourinary: negative  •	Musculoskeletal: negative  •	Neurological: negative  •	Psychiatric: negative  •	Hematology/Lymphatics: negative  •	Endocrine: negative  •	Allergic/Immunologic: negative    Physical Exam:   • Constitutional:	Well-developed, well nourished  • Eyes:	EOMI; PERRL; no drainage or redness  • ENMT:	No oral lesions; no gross abnormalities  • Neck	no thyromegaly or nodules  • Breasts:	not examined  • Back:	No deformity or limitation of movement  • Respiratory:	Breath Sounds equal & clear to auscultation, no accessory muscle use  • Cardiovascular:	Regular rate & rhythm, normal S1, S2; no murmurs, gallops or rubs; no S3, S4  • Gastrointestinal:	Soft, non-tender, no hepatosplenomegaly, normal bowel sounds  • Genitourinary:	not examined  • Rectal: not examined  • Extremities:	No cyanosis, clubbing or edema, right leg dressing intact w/ immobilizer on. left LE dressing intact.  • Vascular:	Equal and normal pulses (dorsalis pedis)  • Neurologica:l	not examined  • Skin:	No lesions; no rash  • Lymph Nodes:	No lymphadedenopathy  • Musculoskeletal:	No joint pain, swelling or deformity; no limitation of movement        LABS:      CBC Full  -  ( 25 Jul 2020 07:46 )  WBC Count : 13.85 K/uL  RBC Count : 3.47 M/uL  Hemoglobin : 8.4 g/dL  Hematocrit : 26.8 %  Platelet Count - Automated : 308 K/uL  Mean Cell Volume : 77.2 fl  Mean Cell Hemoglobin : 24.2 pg  Mean Cell Hemoglobin Concentration : 31.3 gm/dL  Auto Neutrophil # : x  Auto Lymphocyte # : x  Auto Monocyte # : x  Auto Eosinophil # : x  Auto Basophil # : x  Auto Neutrophil % : x  Auto Lymphocyte % : x  Auto Monocyte % : x  Auto Eosinophil % : x  Auto Basophil % : x    07-25    132<L>  |  98  |  13  ----------------------------<  216<H>  4.2   |  24  |  0.78    Ca    8.2<L>      25 Jul 2020 07:46  Mg     1.9     07-24    TPro  5.6<L>  /  Alb  2.4<L>  /  TBili  0.7  /  DBili  x   /  AST  13  /  ALT  20  /  AlkPhos  119  07-24                        RADIOLOGY & ADDITIONAL STUDIES (The following images were personally reviewed):  Loja:                                     No  Urine output:                       adequate  DVT prophylaxis:                 Yes  Flattus:                                  Yes  Bowel movement:              No

## 2020-07-25 NOTE — PROGRESS NOTE ADULT - ATTENDING COMMENTS
Seen by me. Complaining of poorly controlled pain. Participating with PT.     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 Will recheck dressings again tomorrow; if the dusky area appears to be progressing to radha skin and soft tissue necrosis, then likely return to OR for debridement and wound VAC placement on Monday. In the meantime, will continue with antibiotics as per ID.

## 2020-07-25 NOTE — PROGRESS NOTE ADULT - SUBJECTIVE AND OBJECTIVE BOX
Ortho Progress Note    Procedure: R TKA explant, antibiotic spacer  Surgeon: Oh    Pt complaining of muscle spasms this AM. PICC placement deferred until Monday at least. Denies CP, SOB, N/V, numbness/tingling     Vital Signs Last 24 Hrs  T(C): 36.9 (25 Jul 2020 09:02), Max: 37.6 (24 Jul 2020 17:19)  T(F): 98.5 (25 Jul 2020 09:02), Max: 99.6 (24 Jul 2020 17:19)  HR: 87 (25 Jul 2020 09:02) (79 - 87)  BP: 121/63 (25 Jul 2020 09:02) (115/60 - 131/74)  BP(mean): --  RR: 18 (25 Jul 2020 09:02) (18 - 20)  SpO2: 97% (25 Jul 2020 09:02) (93% - 97%)    Physical Exam:  General: Resting comfortably in bed. AAOx3. NAD.  Extremities:        LLE: Dressing in place LLE, diabetic ulcer being managed by vascular team. SILT L2-S1 distribution, symmetric. TA/EHL/FHL/GS motor intact. Feet cool b/l       RLE: Prevena in place with good function. SILT L2-S1 distribution, symmetric. TA/EHL/FHL/GS motor intact. Feet cool b/l, cap refill <3 sec                            8.4    13.85 )-----------( 308      ( 25 Jul 2020 07:46 )             26.8         A/P: 65yMale s/p R total knee explant, antibiotic spacer 7/22  - Stable  - Pain Control  - DVT ppx: ASA/Effient  - Appreciate ID recs: on Vanco, rifampin, will need PICC line   - PT, WBS: TTWB RLE  - Dispo: pending ID plan and clearance from PT    Ortho Pager [phone]

## 2020-07-26 LAB
ANION GAP SERPL CALC-SCNC: 9 MMOL/L — SIGNIFICANT CHANGE UP (ref 5–17)
BASOPHILS # BLD AUTO: 0.03 K/UL — SIGNIFICANT CHANGE UP (ref 0–0.2)
BASOPHILS NFR BLD AUTO: 0.2 % — SIGNIFICANT CHANGE UP (ref 0–2)
BUN SERPL-MCNC: 15 MG/DL — SIGNIFICANT CHANGE UP (ref 7–23)
CALCIUM SERPL-MCNC: 7.9 MG/DL — LOW (ref 8.4–10.5)
CHLORIDE SERPL-SCNC: 101 MMOL/L — SIGNIFICANT CHANGE UP (ref 96–108)
CO2 SERPL-SCNC: 26 MMOL/L — SIGNIFICANT CHANGE UP (ref 22–31)
CREAT SERPL-MCNC: 0.9 MG/DL — SIGNIFICANT CHANGE UP (ref 0.5–1.3)
CRP SERPL-MCNC: 11.45 MG/DL — HIGH (ref 0–0.4)
EOSINOPHIL # BLD AUTO: 0.37 K/UL — SIGNIFICANT CHANGE UP (ref 0–0.5)
EOSINOPHIL NFR BLD AUTO: 3 % — SIGNIFICANT CHANGE UP (ref 0–6)
ERYTHROCYTE [SEDIMENTATION RATE] IN BLOOD: 60 MM/HR — HIGH
GLUCOSE BLDC GLUCOMTR-MCNC: 126 MG/DL — HIGH (ref 70–99)
GLUCOSE BLDC GLUCOMTR-MCNC: 180 MG/DL — HIGH (ref 70–99)
GLUCOSE BLDC GLUCOMTR-MCNC: 202 MG/DL — HIGH (ref 70–99)
GLUCOSE BLDC GLUCOMTR-MCNC: 229 MG/DL — HIGH (ref 70–99)
GLUCOSE SERPL-MCNC: 272 MG/DL — HIGH (ref 70–99)
HCT VFR BLD CALC: 24.4 % — LOW (ref 39–50)
HGB BLD-MCNC: 7.7 G/DL — LOW (ref 13–17)
IMM GRANULOCYTES NFR BLD AUTO: 0.7 % — SIGNIFICANT CHANGE UP (ref 0–1.5)
LYMPHOCYTES # BLD AUTO: 1.17 K/UL — SIGNIFICANT CHANGE UP (ref 1–3.3)
LYMPHOCYTES # BLD AUTO: 9.4 % — LOW (ref 13–44)
MCHC RBC-ENTMCNC: 24.8 PG — LOW (ref 27–34)
MCHC RBC-ENTMCNC: 31.6 GM/DL — LOW (ref 32–36)
MCV RBC AUTO: 78.7 FL — LOW (ref 80–100)
MONOCYTES # BLD AUTO: 1.55 K/UL — HIGH (ref 0–0.9)
MONOCYTES NFR BLD AUTO: 12.4 % — SIGNIFICANT CHANGE UP (ref 2–14)
NEUTROPHILS # BLD AUTO: 9.3 K/UL — HIGH (ref 1.8–7.4)
NEUTROPHILS NFR BLD AUTO: 74.3 % — SIGNIFICANT CHANGE UP (ref 43–77)
NRBC # BLD: 0 /100 WBCS — SIGNIFICANT CHANGE UP (ref 0–0)
PLATELET # BLD AUTO: 308 K/UL — SIGNIFICANT CHANGE UP (ref 150–400)
POTASSIUM SERPL-MCNC: 4.2 MMOL/L — SIGNIFICANT CHANGE UP (ref 3.5–5.3)
POTASSIUM SERPL-SCNC: 4.2 MMOL/L — SIGNIFICANT CHANGE UP (ref 3.5–5.3)
RBC # BLD: 3.1 M/UL — LOW (ref 4.2–5.8)
RBC # FLD: 18.5 % — HIGH (ref 10.3–14.5)
SODIUM SERPL-SCNC: 136 MMOL/L — SIGNIFICANT CHANGE UP (ref 135–145)
VANCOMYCIN TROUGH SERPL-MCNC: 15.7 UG/ML — SIGNIFICANT CHANGE UP (ref 10–20)
WBC # BLD: 12.51 K/UL — HIGH (ref 3.8–10.5)
WBC # FLD AUTO: 12.51 K/UL — HIGH (ref 3.8–10.5)

## 2020-07-26 PROCEDURE — 99232 SBSQ HOSP IP/OBS MODERATE 35: CPT

## 2020-07-26 RX ORDER — POVIDONE-IODINE 5 %
1 AEROSOL (ML) TOPICAL ONCE
Refills: 0 | Status: COMPLETED | OUTPATIENT
Start: 2020-07-26 | End: 2020-07-30

## 2020-07-26 RX ORDER — CHLORHEXIDINE GLUCONATE 213 G/1000ML
1 SOLUTION TOPICAL EVERY 12 HOURS
Refills: 0 | Status: COMPLETED | OUTPATIENT
Start: 2020-07-26 | End: 2020-07-27

## 2020-07-26 RX ORDER — NITROGLYCERIN 6.5 MG
1 CAPSULE, EXTENDED RELEASE ORAL
Refills: 0 | Status: DISCONTINUED | OUTPATIENT
Start: 2020-07-26 | End: 2020-08-01

## 2020-07-26 RX ADMIN — Medication 8 UNIT(S): at 17:23

## 2020-07-26 RX ADMIN — Medication 975 MILLIGRAM(S): at 13:00

## 2020-07-26 RX ADMIN — Medication 975 MILLIGRAM(S): at 06:03

## 2020-07-26 RX ADMIN — HEPARIN SODIUM 5000 UNIT(S): 5000 INJECTION INTRAVENOUS; SUBCUTANEOUS at 06:28

## 2020-07-26 RX ADMIN — DULOXETINE HYDROCHLORIDE 90 MILLIGRAM(S): 30 CAPSULE, DELAYED RELEASE ORAL at 13:00

## 2020-07-26 RX ADMIN — HEPARIN SODIUM 5000 UNIT(S): 5000 INJECTION INTRAVENOUS; SUBCUTANEOUS at 23:49

## 2020-07-26 RX ADMIN — INSULIN GLARGINE 16 UNIT(S): 100 INJECTION, SOLUTION SUBCUTANEOUS at 22:09

## 2020-07-26 RX ADMIN — HEPARIN SODIUM 5000 UNIT(S): 5000 INJECTION INTRAVENOUS; SUBCUTANEOUS at 15:51

## 2020-07-26 RX ADMIN — OXYCODONE HYDROCHLORIDE 10 MILLIGRAM(S): 5 TABLET ORAL at 06:03

## 2020-07-26 RX ADMIN — Medication 975 MILLIGRAM(S): at 21:37

## 2020-07-26 RX ADMIN — Medication 81 MILLIGRAM(S): at 06:03

## 2020-07-26 RX ADMIN — Medication 975 MILLIGRAM(S): at 13:50

## 2020-07-26 RX ADMIN — Medication 4: at 08:41

## 2020-07-26 RX ADMIN — PRASUGREL 10 MILLIGRAM(S): 5 TABLET, FILM COATED ORAL at 13:00

## 2020-07-26 RX ADMIN — MORPHINE SULFATE 4 MILLIGRAM(S): 50 CAPSULE, EXTENDED RELEASE ORAL at 05:14

## 2020-07-26 RX ADMIN — OXYCODONE HYDROCHLORIDE 10 MILLIGRAM(S): 5 TABLET ORAL at 17:07

## 2020-07-26 RX ADMIN — MORPHINE SULFATE 4 MILLIGRAM(S): 50 CAPSULE, EXTENDED RELEASE ORAL at 05:36

## 2020-07-26 RX ADMIN — CHLORHEXIDINE GLUCONATE 1 APPLICATION(S): 213 SOLUTION TOPICAL at 12:48

## 2020-07-26 RX ADMIN — Medication 250 MILLIGRAM(S): at 03:27

## 2020-07-26 RX ADMIN — Medication 250 MILLIGRAM(S): at 13:01

## 2020-07-26 RX ADMIN — Medication 8 UNIT(S): at 08:42

## 2020-07-26 RX ADMIN — Medication 975 MILLIGRAM(S): at 22:30

## 2020-07-26 RX ADMIN — OXYCODONE HYDROCHLORIDE 10 MILLIGRAM(S): 5 TABLET ORAL at 07:25

## 2020-07-26 RX ADMIN — CYCLOBENZAPRINE HYDROCHLORIDE 10 MILLIGRAM(S): 10 TABLET, FILM COATED ORAL at 13:03

## 2020-07-26 RX ADMIN — Medication 975 MILLIGRAM(S): at 07:25

## 2020-07-26 RX ADMIN — Medication 25 MILLIGRAM(S): at 06:03

## 2020-07-26 RX ADMIN — OXYCODONE HYDROCHLORIDE 10 MILLIGRAM(S): 5 TABLET ORAL at 18:07

## 2020-07-26 RX ADMIN — CHLORHEXIDINE GLUCONATE 1 APPLICATION(S): 213 SOLUTION TOPICAL at 21:47

## 2020-07-26 RX ADMIN — Medication 81 MILLIGRAM(S): at 17:07

## 2020-07-26 RX ADMIN — Medication 8 UNIT(S): at 12:41

## 2020-07-26 RX ADMIN — POLYETHYLENE GLYCOL 3350 17 GRAM(S): 17 POWDER, FOR SOLUTION ORAL at 13:00

## 2020-07-26 RX ADMIN — Medication 4: at 22:10

## 2020-07-26 RX ADMIN — SPIRONOLACTONE 25 MILLIGRAM(S): 25 TABLET, FILM COATED ORAL at 06:03

## 2020-07-26 RX ADMIN — ATORVASTATIN CALCIUM 40 MILLIGRAM(S): 80 TABLET, FILM COATED ORAL at 21:37

## 2020-07-26 RX ADMIN — PANTOPRAZOLE SODIUM 40 MILLIGRAM(S): 20 TABLET, DELAYED RELEASE ORAL at 06:03

## 2020-07-26 RX ADMIN — Medication 2: at 17:23

## 2020-07-26 NOTE — PROGRESS NOTE ADULT - SUBJECTIVE AND OBJECTIVE BOX
Ortho Progress Note    Pt's pain much improved today.  PICC placement deferred until Monday at least. Denies CP, SOB, N/V, numbness/tingling. Pt aware and consented to OR on Monday with Dr. Castro    Vital Signs Last 24 Hrs  T(C): 36.6 (26 Jul 2020 05:17), Max: 36.9 (25 Jul 2020 09:02)  T(F): 97.8 (26 Jul 2020 05:17), Max: 98.5 (25 Jul 2020 09:02)  HR: 78 (26 Jul 2020 05:17) (75 - 88)  BP: 122/58 (26 Jul 2020 05:17) (96/54 - 122/58)  BP(mean): --  RR: 18 (26 Jul 2020 05:17) (17 - 18)  SpO2: 96% (26 Jul 2020 05:17) (94% - 97%)    Physical Exam:  General: Resting comfortably in bed. AAOx3. NAD.  Extremities:        LLE: Dressing in place LLE, diabetic ulcer being managed by vascular team. SILT L2-S1 distribution, symmetric. TA/EHL/FHL/GS motor intact. Feet cool b/l       RLE: Gauze/Xeroform dressing C/D/I. SILT L2-S1 distribution, symmetric. TA/EHL/FHL/GS motor intact. Feet cool b/l, cap refill <3 sec                            8.4    13.85 )-----------( 308      ( 25 Jul 2020 07:46 )             26.8         A/P: 65yMale s/p R total knee explant, antibiotic spacer 7/22, POD#4  - Stable  - Pain Control  - DVT ppx: ASA/Effient  - Appreciate ID recs: on Vanco, rifampin, will need PICC line   - Vanco trough overnight 15, will continue on 1g Q12  - Continue to trend WBC/ESR/CRP  - F/u blood cultures  - PT, WBS: TTWB RLE  - NPO after midnight tonight  - Dispo: added on for OR tomorrow    Ortho Pager [phone] Ortho Progress Note    Pt's pain much improved today.  PICC placement deferred until Monday at least. Denies CP, SOB, N/V, numbness/tingling.    Vital Signs Last 24 Hrs  T(C): 36.6 (26 Jul 2020 05:17), Max: 36.9 (25 Jul 2020 09:02)  T(F): 97.8 (26 Jul 2020 05:17), Max: 98.5 (25 Jul 2020 09:02)  HR: 78 (26 Jul 2020 05:17) (75 - 88)  BP: 122/58 (26 Jul 2020 05:17) (96/54 - 122/58)  BP(mean): --  RR: 18 (26 Jul 2020 05:17) (17 - 18)  SpO2: 96% (26 Jul 2020 05:17) (94% - 97%)    Physical Exam:  General: Resting comfortably in bed. AAOx3. NAD.  Extremities:        LLE: Dressing in place LLE, diabetic ulcer being managed by vascular team. SILT L2-S1 distribution, symmetric. TA/EHL/FHL/GS motor intact. Feet cool b/l       RLE: Gauze/Xeroform dressing C/D/I. SILT L2-S1 distribution, symmetric. TA/EHL/FHL/GS motor intact. Feet cool b/l, cap refill <3 sec                            8.4    13.85 )-----------( 308      ( 25 Jul 2020 07:46 )             26.8         A/P: 65yMale s/p R total knee explant, antibiotic spacer 7/22, POD#4  - Stable  - Pain Control  - DVT ppx: ASA/Effient  - Appreciate ID recs: on Vanco, rifampin, will need PICC line   - Vanco trough overnight 15, will continue on 1g Q12  - Continue to trend WBC/ESR/CRP  - F/u blood cultures  - PT, WBS: TTWB RLE    Ortho Pager [phone]

## 2020-07-26 NOTE — PROGRESS NOTE ADULT - SUBJECTIVE AND OBJECTIVE BOX
INFECTIOUS DISEASES CONSULT FOLLOW-UP NOTE    INTERVAL HPI/OVERNIGHT EVENTS:  no event overnight  patient reports he slept well  knee pain better controlled today       CONSTITUTIONAL:  Negative fever or chills, feels well, good appetite  EYES:  Negative  blurry vision or double vision  CARDIOVASCULAR:  Negative for chest pain or palpitations  RESPIRATORY:  Negative for cough, wheezing, or SOB   GASTROINTESTINAL:  Negative for nausea, vomiting, diarrhea, constipation, or abdominal pain  GENITOURINARY:  Negative frequency, urgency or dysuria  NEUROLOGIC:  No headache, confusion, dizziness, lightheadedness      ANTIBIOTICS/RELEVANT:    MEDICATIONS  (STANDING):  acetaminophen   Tablet .. 975 milliGRAM(s) Oral every 8 hours  aspirin enteric coated 81 milliGRAM(s) Oral two times a day  atorvastatin 40 milliGRAM(s) Oral at bedtime  chlorhexidine 2% Cloths 1 Application(s) Topical daily  chlorhexidine 2% Cloths 1 Application(s) Topical every 12 hours  dextrose 5%. 1000 milliLiter(s) (50 mL/Hr) IV Continuous <Continuous>  dextrose 50% Injectable 12.5 Gram(s) IV Push once  dextrose 50% Injectable 25 Gram(s) IV Push once  dextrose 50% Injectable 25 Gram(s) IV Push once  digoxin     Tablet 0.125 milliGRAM(s) Oral daily  DULoxetine 90 milliGRAM(s) Oral daily  heparin   Injectable 5000 Unit(s) SubCutaneous every 8 hours  insulin glargine Injectable (LANTUS) 16 Unit(s) SubCutaneous at bedtime  insulin lispro (HumaLOG) corrective regimen sliding scale   SubCutaneous Before meals and at bedtime  insulin lispro Injectable (HumaLOG) 8 Unit(s) SubCutaneous three times a day before meals  lactated ringers. 1000 milliLiter(s) (80 mL/Hr) IV Continuous <Continuous>  metoprolol succinate ER 25 milliGRAM(s) Oral daily  oxyCODONE  ER Tablet 10 milliGRAM(s) Oral every 12 hours  pantoprazole    Tablet 40 milliGRAM(s) Oral before breakfast  polyethylene glycol 3350 17 Gram(s) Oral daily  povidone iodine 5% Nasal Swab 1 Application(s) Both Nostrils once  prasugrel 10 milliGRAM(s) Oral daily  rifAMPin 300 milliGRAM(s) Oral every 12 hours  spironolactone 25 milliGRAM(s) Oral daily  tamsulosin 0.4 milliGRAM(s) Oral at bedtime  vancomycin  IVPB 1000 milliGRAM(s) IV Intermittent every 12 hours    MEDICATIONS  (PRN):  aluminum hydroxide/magnesium hydroxide/simethicone Suspension 30 milliLiter(s) Oral four times a day PRN Indigestion  bisacodyl Suppository 10 milliGRAM(s) Rectal daily PRN If no bowel movement by POD#2  cyclobenzaprine 10 milliGRAM(s) Oral three times a day PRN Muscle Spasm  dextrose 40% Gel 15 Gram(s) Oral once PRN Blood Glucose LESS THAN 70 milliGRAM(s)/deciliter  glucagon  Injectable 1 milliGRAM(s) IntraMuscular once PRN Glucose LESS THAN 70 milligrams/deciliter  HYDROmorphone  Injectable 1 milliGRAM(s) IV Push every 4 hours PRN Breakthrough pain  magnesium hydroxide Suspension 30 milliLiter(s) Oral daily PRN Constipation  morphine  - Injectable 4 milliGRAM(s) IV Push every 3 hours PRN Severe Pain (7 - 10)  ondansetron Injectable 4 milliGRAM(s) IV Push every 6 hours PRN Nausea and/or Vomiting  oxyCODONE    IR 5 milliGRAM(s) Oral every 3 hours PRN Mild Pain (1 - 3)  oxyCODONE    IR 10 milliGRAM(s) Oral every 3 hours PRN Moderate Pain (4 - 6)  senna 2 Tablet(s) Oral at bedtime PRN Constipation        Vital Signs Last 24 Hrs  T(C): 36.9 (26 Jul 2020 16:36), Max: 37 (26 Jul 2020 14:06)  T(F): 98.4 (26 Jul 2020 16:36), Max: 98.6 (26 Jul 2020 14:06)  HR: 81 (26 Jul 2020 16:36) (72 - 81)  BP: 111/67 (26 Jul 2020 16:36) (101/57 - 122/58)  BP(mean): --  RR: 18 (26 Jul 2020 16:36) (17 - 18)  SpO2: 96% (26 Jul 2020 16:36) (95% - 97%)    07-25-20 @ 07:01  -  07-26-20 @ 07:00  --------------------------------------------------------  IN: 250 mL / OUT: 500 mL / NET: -250 mL    07-26-20 @ 07:01  -  07-26-20 @ 21:48  --------------------------------------------------------  IN: 0 mL / OUT: 700 mL / NET: -700 mL      PHYSICAL EXAM:  Constitutional:Well-developed, well nourished  Eyes:DAMARIS, EOMI  Ear/Nose/Throat: no oral lesion, no sinus tenderness on percussion	  Neck:no JVD, no lymphadenopathy, supple  Respiratory: CTA colton  Cardiovascular: S1S2 RRR, no murmurs  Gastrointestinal:soft, (+) BS, no HSM  Extremities:no R knee covered with dressing   Vascular: DP Pulse:	right normal; left normal      LABS:                        7.7    12.51 )-----------( 308      ( 26 Jul 2020 06:12 )             24.4     07-26    136  |  101  |  15  ----------------------------<  272<H>  4.2   |  26  |  0.90    Ca    7.9<L>      26 Jul 2020 06:12            MICROBIOLOGY:  7/22 synovial culture ngtd      RADIOLOGY & ADDITIONAL STUDIES:

## 2020-07-26 NOTE — HISTORY OF PRESENT ILLNESS
[Other Location: e.g. Home (Enter Location, City,State)___] : at [unfilled] [Home] : at home, [unfilled] , at the time of the visit. [Verbal consent obtained from patient] : the patient, [unfilled] [de-identified] : Patient is a 65 year old male with CAD, diabetes. cardiomyopathy and peripheral vascular disease. He was recently hospitalized for an extended period of time with a hip fracture, developed sepsis and heart failure requiring placement of an AICD. He had a cholecystostomy tube placed for suspected cholecystitis. He was discharged with the drain and recently had it removed (2 weeks ago). He has been asymptomatic from hid gallbladder since. he was told that he would have to have his gallbladder removed to prevent future episodes of cholecystitis.

## 2020-07-26 NOTE — ASSESSMENT
[FreeTextEntry1] : The plan would be to do an elective laparoscopic cholecystectomy. He would need to be cleared by his cardiologist, endocrinologist, vascular surgeon and PCP.

## 2020-07-26 NOTE — PROGRESS NOTE ADULT - ATTENDING COMMENTS
Seen by me this afternoon. Much more comfortable today.    Right knee dressings changed  Dusky appearance of prior STSG site, ~60% of lateral aspect; medial aspect still appearing healthy, no clear line of demarcation  Lateral skin tear unchanged from yesterday  No purulence    Left tibia with unchanged appearance of dry open wound to tibia and tibialis anterior, no active erythema/purulence/drainage    Imp: 64y/o male with multiple severe comorbidities s/p right knee explant and static spacer for chronic MRSA PJI; also with nonhealing diabetic ulcer left leg  - No longer pursuing surgical plans for right knee I&D / VAC tomorrow; will wait for clearer demarcation prior to debridement  - Left tibia wound appears to be free of infection but very slow to heal. Will attempt nitroglycerin paste treatment  - PICC line tomorrow  - Continue following clinically; trend CBC and markers

## 2020-07-26 NOTE — PROGRESS NOTE ADULT - SUBJECTIVE AND OBJECTIVE BOX
Interval Events: Reviewed  Patient seen and examined at bedside.    Patient is a 65y old  Male who presents with a chief complaint of sepsis (25 Jul 2020 09:38)  Pain is better controlled this morning per pt.       PAST MEDICAL & SURGICAL HISTORY:  Diabetic neuropathy  STEMI (ST elevation myocardial infarction)  Diverticulitis  MRSA bacteremia  History of celiac disease  CHF (congestive heart failure): EF ~ 25%  HTN (hypertension)  Diabetes: on insulin pump  Blood clot due to device, implant, or graft: was on blood thinners  HLD (hyperlipidemia)  Osteoarthritis  Atherosclerosis of coronary artery: CAD (coronary artery disease)  Status post percutaneous transluminal coronary angioplasty: in 2012  History of open reduction and internal fixation (ORIF) procedure  Surgery, elective: Right shoulder  Surgery, elective: right knee wound debridement  S/P TKR (total knee replacement), right: with infection Mrsa   per pt he was cleared from MRSA infection  S/P CABG x 1: 2018  Stented coronary artery: 10/18 heart attack  INFERIOR WALL MI  Other postprocedural status: Fixation hardware in foot LEFT      MEDICATIONS:  Pulmonary:    Antimicrobials:  rifAMPin 300 milliGRAM(s) Oral every 12 hours  vancomycin  IVPB 1000 milliGRAM(s) IV Intermittent every 12 hours    Anticoagulants:  aspirin enteric coated 81 milliGRAM(s) Oral two times a day  heparin   Injectable 5000 Unit(s) SubCutaneous every 8 hours  prasugrel 10 milliGRAM(s) Oral daily    Cardiac:  digoxin     Tablet 0.125 milliGRAM(s) Oral daily  metoprolol succinate ER 25 milliGRAM(s) Oral daily  spironolactone 25 milliGRAM(s) Oral daily  tamsulosin 0.4 milliGRAM(s) Oral at bedtime      Allergies    ACE inhibitors (Hives)  carvedilol (Other)  enalapril (Hives)  Entresto (Other)    Intolerances        Vital Signs Last 24 Hrs  T(C): 36.6 (26 Jul 2020 05:17), Max: 36.9 (25 Jul 2020 09:02)  T(F): 97.8 (26 Jul 2020 05:17), Max: 98.5 (25 Jul 2020 09:02)  HR: 78 (26 Jul 2020 05:17) (75 - 88)  BP: 122/58 (26 Jul 2020 05:17) (96/54 - 122/58)  BP(mean): --  RR: 18 (26 Jul 2020 05:17) (17 - 18)  SpO2: 96% (26 Jul 2020 05:17) (94% - 97%)    07-25 @ 07:01  -  07-26 @ 07:00  --------------------------------------------------------  IN: 250 mL / OUT: 500 mL / NET: -250 mL          Review of Systems:   •	General: negative  •	Skin/Breast: negative  •	Ophthalmologic: negative  •	ENMT: negative  •	Respiratory and Thorax: negative  •	Cardiovascular: negative  •	Gastrointestinal: negative  •	Genitourinary: negative  •	Musculoskeletal: negative  •	Neurological: negative  •	Psychiatric: negative  •	Hematology/Lymphatics: negative  •	Endocrine: negative  •	Allergic/Immunologic: negative    Physical Exam:   • Constitutional:	Well-developed, well nourished  • Eyes:	EOMI; PERRL; no drainage or redness  • ENMT:	No oral lesions; no gross abnormalities  • Neck	no thyromegaly or nodules  • Breasts:	not examined  • Back:	No deformity or limitation of movement  • Respiratory:	Breath Sounds equal & clear to auscultation, no accessory muscle use  • Cardiovascular:	Regular rate & rhythm, normal S1, S2; no murmurs, gallops or rubs; no S3, S4  • Gastrointestinal:	Soft, non-tender, no hepatosplenomegaly, normal bowel sounds  • Genitourinary:	not examined  • Rectal: not examined  • Extremities:	No cyanosis, clubbing or edema, right knee dressing intact with immobilizer, left shin dressing intact.   • Vascular:	Equal and normal pulses (dorsalis pedis)  • Neurologica:l	not examined  • Skin:	No lesions; no rash  • Lymph Nodes:	No lymphadedenopathy  • Musculoskeletal:	No joint pain, swelling or deformity; no limitation of movement        LABS:      CBC Full  -  ( 26 Jul 2020 06:12 )  WBC Count : 12.51 K/uL  RBC Count : 3.10 M/uL  Hemoglobin : 7.7 g/dL  Hematocrit : 24.4 %  Platelet Count - Automated : 308 K/uL  Mean Cell Volume : 78.7 fl  Mean Cell Hemoglobin : 24.8 pg  Mean Cell Hemoglobin Concentration : 31.6 gm/dL  Auto Neutrophil # : 9.30 K/uL  Auto Lymphocyte # : 1.17 K/uL  Auto Monocyte # : 1.55 K/uL  Auto Eosinophil # : 0.37 K/uL  Auto Basophil # : 0.03 K/uL  Auto Neutrophil % : 74.3 %  Auto Lymphocyte % : 9.4 %  Auto Monocyte % : 12.4 %  Auto Eosinophil % : 3.0 %  Auto Basophil % : 0.2 %    07-26    136  |  101  |  15  ----------------------------<  272<H>  4.2   |  26  |  0.90    Ca    7.9<L>      26 Jul 2020 06:12                          RADIOLOGY & ADDITIONAL STUDIES (The following images were personally reviewed):  Loja:                                     No  Urine output:                       adequate  DVT prophylaxis:                 Yes  Flattus:                                  Yes  Bowel movement:              No

## 2020-07-26 NOTE — PROGRESS NOTE ADULT - SUBJECTIVE AND OBJECTIVE BOX
Subjective: Patient seen at the bedside today. Is comfortable and has no complaints. Denies left leg pain, leg swelling, fevers, chills, SOB, chest pain.    rifAMPin 300  vancomycin  IVPB 1000  aspirin enteric coated 81  digoxin     Tablet 0.125  heparin   Injectable 5000  metoprolol succinate ER 25  prasugrel 10  rifAMPin 300  spironolactone 25  tamsulosin 0.4  vancomycin  IVPB 1000      Allergies    ACE inhibitors (Hives)  carvedilol (Other)  enalapril (Hives)  Entresto (Other)    Intolerances        Vital Signs Last 24 Hrs  T(C): 37 (26 Jul 2020 14:06), Max: 37 (26 Jul 2020 14:06)  T(F): 98.6 (26 Jul 2020 14:06), Max: 98.6 (26 Jul 2020 14:06)  HR: 80 (26 Jul 2020 14:06) (72 - 84)  BP: 112/56 (26 Jul 2020 14:06) (96/54 - 122/58)  BP(mean): --  RR: 17 (26 Jul 2020 14:06) (17 - 18)  SpO2: 95% (26 Jul 2020 14:06) (94% - 97%)  I&O's Summary    25 Jul 2020 07:01  -  26 Jul 2020 07:00  --------------------------------------------------------  IN: 250 mL / OUT: 500 mL / NET: -250 mL    26 Jul 2020 07:01  -  26 Jul 2020 15:46  --------------------------------------------------------  IN: 0 mL / OUT: 400 mL / NET: -400 mL    Physical Exam:  General: NAD, resting comfortably in bed.  Pulmonary: Equal and bilateral chest rise.  Cardiovascular: NS  Abdominal: soft, ND/NT  Extremities: LLE no CCE, large wound [id number] in jae-lateral tibia site, 1cm deep with exposed muscle. No discharge No erythema, no swelling. RLE with prevena VAC functioning with good suction.   Large wound approximately 74k48ns on LLE with visible anterolateral tibia muscle. No discharge, erythema, or swelling. Site covered in Kirlex. RLE with compression boot. 5/5 dorsiflexion/plantarflexion bilaterally of both LE. Normal sensation bilaterally of both LE.  Neuro: A/O x 3, CNs II-XII grossly intact, normal sensation, no focal deficits  Pulses: Left leg with palpable femoral, palpable popliteal, and triphasic DP/PT signals  Right leg with palpable femoral, palpable popliteal, palpable PT, and biphasic DP signal.      A/P: 65yMale s/p R total knee explant, antibiotic spacer 7/22 - LLE non healing wound on lateral mid tibia with exposed bone.    Recommendations:  Daily wound care with hydrogel and kirlex wrapped around the wound.  Do not recommend any vascular intervention at this time. Will continue with followup at the office once discharged.  Call [phone] with questions    LABS:                        7.7    12.51 )-----------( 308      ( 26 Jul 2020 06:12 )             24.4     07-26    136  |  101  |  15  ----------------------------<  272<H>  4.2   |  26  |  0.90    Ca    7.9<L>      26 Jul 2020 06:12          Radiology and Additional Studies:

## 2020-07-27 LAB
ANION GAP SERPL CALC-SCNC: 9 MMOL/L — SIGNIFICANT CHANGE UP (ref 5–17)
BLD GP AB SCN SERPL QL: NEGATIVE — SIGNIFICANT CHANGE UP
BUN SERPL-MCNC: 15 MG/DL — SIGNIFICANT CHANGE UP (ref 7–23)
CALCIUM SERPL-MCNC: 8.1 MG/DL — LOW (ref 8.4–10.5)
CHLORIDE SERPL-SCNC: 99 MMOL/L — SIGNIFICANT CHANGE UP (ref 96–108)
CO2 SERPL-SCNC: 26 MMOL/L — SIGNIFICANT CHANGE UP (ref 22–31)
CREAT SERPL-MCNC: 0.87 MG/DL — SIGNIFICANT CHANGE UP (ref 0.5–1.3)
GLUCOSE BLDC GLUCOMTR-MCNC: 136 MG/DL — HIGH (ref 70–99)
GLUCOSE BLDC GLUCOMTR-MCNC: 149 MG/DL — HIGH (ref 70–99)
GLUCOSE BLDC GLUCOMTR-MCNC: 158 MG/DL — HIGH (ref 70–99)
GLUCOSE BLDC GLUCOMTR-MCNC: 245 MG/DL — HIGH (ref 70–99)
GLUCOSE SERPL-MCNC: 239 MG/DL — HIGH (ref 70–99)
HCT VFR BLD CALC: 26.3 % — LOW (ref 39–50)
HGB BLD-MCNC: 7.8 G/DL — LOW (ref 13–17)
MCHC RBC-ENTMCNC: 23.7 PG — LOW (ref 27–34)
MCHC RBC-ENTMCNC: 29.7 GM/DL — LOW (ref 32–36)
MCV RBC AUTO: 79.9 FL — LOW (ref 80–100)
NRBC # BLD: 0 /100 WBCS — SIGNIFICANT CHANGE UP (ref 0–0)
PLATELET # BLD AUTO: 385 K/UL — SIGNIFICANT CHANGE UP (ref 150–400)
POTASSIUM SERPL-MCNC: 4.5 MMOL/L — SIGNIFICANT CHANGE UP (ref 3.5–5.3)
POTASSIUM SERPL-SCNC: 4.5 MMOL/L — SIGNIFICANT CHANGE UP (ref 3.5–5.3)
RBC # BLD: 3.29 M/UL — LOW (ref 4.2–5.8)
RBC # FLD: 18.8 % — HIGH (ref 10.3–14.5)
RH IG SCN BLD-IMP: POSITIVE — SIGNIFICANT CHANGE UP
SODIUM SERPL-SCNC: 134 MMOL/L — LOW (ref 135–145)
WBC # BLD: 11.23 K/UL — HIGH (ref 3.8–10.5)
WBC # FLD AUTO: 11.23 K/UL — HIGH (ref 3.8–10.5)

## 2020-07-27 PROCEDURE — 99232 SBSQ HOSP IP/OBS MODERATE 35: CPT

## 2020-07-27 PROCEDURE — 36569 INSJ PICC 5 YR+ W/O IMAGING: CPT

## 2020-07-27 PROCEDURE — 99232 SBSQ HOSP IP/OBS MODERATE 35: CPT | Mod: GC

## 2020-07-27 RX ORDER — CHLORHEXIDINE GLUCONATE 213 G/1000ML
1 SOLUTION TOPICAL
Refills: 0 | Status: DISCONTINUED | OUTPATIENT
Start: 2020-07-27 | End: 2020-08-10

## 2020-07-27 RX ORDER — SODIUM CHLORIDE 9 MG/ML
10 INJECTION INTRAMUSCULAR; INTRAVENOUS; SUBCUTANEOUS
Refills: 0 | Status: DISCONTINUED | OUTPATIENT
Start: 2020-07-27 | End: 2020-08-10

## 2020-07-27 RX ADMIN — OXYCODONE HYDROCHLORIDE 10 MILLIGRAM(S): 5 TABLET ORAL at 14:00

## 2020-07-27 RX ADMIN — Medication 8 UNIT(S): at 17:11

## 2020-07-27 RX ADMIN — Medication 250 MILLIGRAM(S): at 02:00

## 2020-07-27 RX ADMIN — INSULIN GLARGINE 16 UNIT(S): 100 INJECTION, SOLUTION SUBCUTANEOUS at 22:09

## 2020-07-27 RX ADMIN — Medication 975 MILLIGRAM(S): at 06:15

## 2020-07-27 RX ADMIN — HEPARIN SODIUM 5000 UNIT(S): 5000 INJECTION INTRAVENOUS; SUBCUTANEOUS at 07:27

## 2020-07-27 RX ADMIN — OXYCODONE HYDROCHLORIDE 10 MILLIGRAM(S): 5 TABLET ORAL at 09:50

## 2020-07-27 RX ADMIN — OXYCODONE HYDROCHLORIDE 10 MILLIGRAM(S): 5 TABLET ORAL at 07:17

## 2020-07-27 RX ADMIN — Medication 975 MILLIGRAM(S): at 23:26

## 2020-07-27 RX ADMIN — HYDROMORPHONE HYDROCHLORIDE 1 MILLIGRAM(S): 2 INJECTION INTRAMUSCULAR; INTRAVENOUS; SUBCUTANEOUS at 02:22

## 2020-07-27 RX ADMIN — PANTOPRAZOLE SODIUM 40 MILLIGRAM(S): 20 TABLET, DELAYED RELEASE ORAL at 06:14

## 2020-07-27 RX ADMIN — Medication 2: at 17:11

## 2020-07-27 RX ADMIN — Medication 25 MILLIGRAM(S): at 06:15

## 2020-07-27 RX ADMIN — OXYCODONE HYDROCHLORIDE 10 MILLIGRAM(S): 5 TABLET ORAL at 09:20

## 2020-07-27 RX ADMIN — CHLORHEXIDINE GLUCONATE 1 APPLICATION(S): 213 SOLUTION TOPICAL at 09:20

## 2020-07-27 RX ADMIN — Medication 1 INCH(S): at 22:22

## 2020-07-27 RX ADMIN — OXYCODONE HYDROCHLORIDE 10 MILLIGRAM(S): 5 TABLET ORAL at 06:14

## 2020-07-27 RX ADMIN — OXYCODONE HYDROCHLORIDE 10 MILLIGRAM(S): 5 TABLET ORAL at 17:24

## 2020-07-27 RX ADMIN — MORPHINE SULFATE 4 MILLIGRAM(S): 50 CAPSULE, EXTENDED RELEASE ORAL at 00:20

## 2020-07-27 RX ADMIN — OXYCODONE HYDROCHLORIDE 10 MILLIGRAM(S): 5 TABLET ORAL at 17:12

## 2020-07-27 RX ADMIN — Medication 1 INCH(S): at 09:26

## 2020-07-27 RX ADMIN — Medication 975 MILLIGRAM(S): at 22:08

## 2020-07-27 RX ADMIN — OXYCODONE HYDROCHLORIDE 10 MILLIGRAM(S): 5 TABLET ORAL at 05:21

## 2020-07-27 RX ADMIN — DULOXETINE HYDROCHLORIDE 90 MILLIGRAM(S): 30 CAPSULE, DELAYED RELEASE ORAL at 09:20

## 2020-07-27 RX ADMIN — Medication 975 MILLIGRAM(S): at 13:43

## 2020-07-27 RX ADMIN — MORPHINE SULFATE 4 MILLIGRAM(S): 50 CAPSULE, EXTENDED RELEASE ORAL at 00:04

## 2020-07-27 RX ADMIN — Medication 8 UNIT(S): at 09:40

## 2020-07-27 RX ADMIN — ATORVASTATIN CALCIUM 40 MILLIGRAM(S): 80 TABLET, FILM COATED ORAL at 22:08

## 2020-07-27 RX ADMIN — SPIRONOLACTONE 25 MILLIGRAM(S): 25 TABLET, FILM COATED ORAL at 06:15

## 2020-07-27 RX ADMIN — Medication 250 MILLIGRAM(S): at 13:43

## 2020-07-27 RX ADMIN — OXYCODONE HYDROCHLORIDE 10 MILLIGRAM(S): 5 TABLET ORAL at 13:34

## 2020-07-27 RX ADMIN — Medication 975 MILLIGRAM(S): at 07:17

## 2020-07-27 RX ADMIN — POLYETHYLENE GLYCOL 3350 17 GRAM(S): 17 POWDER, FOR SOLUTION ORAL at 09:20

## 2020-07-27 RX ADMIN — Medication 81 MILLIGRAM(S): at 17:12

## 2020-07-27 RX ADMIN — Medication 81 MILLIGRAM(S): at 06:14

## 2020-07-27 RX ADMIN — Medication 1 INCH(S): at 22:09

## 2020-07-27 RX ADMIN — OXYCODONE HYDROCHLORIDE 10 MILLIGRAM(S): 5 TABLET ORAL at 04:38

## 2020-07-27 RX ADMIN — CHLORHEXIDINE GLUCONATE 1 APPLICATION(S): 213 SOLUTION TOPICAL at 09:21

## 2020-07-27 RX ADMIN — HEPARIN SODIUM 5000 UNIT(S): 5000 INJECTION INTRAVENOUS; SUBCUTANEOUS at 23:07

## 2020-07-27 RX ADMIN — Medication 4: at 07:28

## 2020-07-27 RX ADMIN — OXYCODONE HYDROCHLORIDE 10 MILLIGRAM(S): 5 TABLET ORAL at 17:45

## 2020-07-27 RX ADMIN — PRASUGREL 10 MILLIGRAM(S): 5 TABLET, FILM COATED ORAL at 09:20

## 2020-07-27 RX ADMIN — HEPARIN SODIUM 5000 UNIT(S): 5000 INJECTION INTRAVENOUS; SUBCUTANEOUS at 14:07

## 2020-07-27 RX ADMIN — HYDROMORPHONE HYDROCHLORIDE 1 MILLIGRAM(S): 2 INJECTION INTRAMUSCULAR; INTRAVENOUS; SUBCUTANEOUS at 02:07

## 2020-07-27 NOTE — PROCEDURE NOTE - NSPROCDETAILS_GEN_ALL_CORE
location identified, draped/prepped, sterile technique used/ultrasound assessment/supine position/sterile dressing applied/sterile technique, catheter placed/ultrasound guidance
sterile technique, catheter placed/ultrasound utilization/blood seen on insertion/dressing applied/flushes easily/location identified, draped/prepped, sterile technique used/secured in place
incision left open, packing placed

## 2020-07-27 NOTE — PROGRESS NOTE ADULT - SUBJECTIVE AND OBJECTIVE BOX
Patient seen at the bedside today. Is comfortable and has no complaints. Denies left leg pain, leg swelling, fevers, chills, SOB, chest pain.    Vital Signs Last 24 Hrs  T(C): 36.7 (27 Jul 2020 04:46), Max: 37 (26 Jul 2020 14:06)  T(F): 98.1 (27 Jul 2020 04:46), Max: 98.6 (26 Jul 2020 14:06)  HR: 81 (27 Jul 2020 04:46) (80 - 81)  BP: 119/51 (27 Jul 2020 04:46) (111/67 - 119/51)  BP(mean): --  RR: 20 (27 Jul 2020 04:46) (17 - 20)  SpO2: 97% (27 Jul 2020 04:46) (95% - 97%)    Physical Exam:  General: NAD, resting comfortably in bed.  Pulmonary: Equal and bilateral chest rise.  Cardiovascular: NS  Abdominal: soft, ND/NT  Extremities: LLE no CCE, large wound [id number] in jae-lateral tibia site, 1cm deep with exposed muscle. No discharge No erythema, no swelling. RLE with prevena VAC functioning with good suction.   Large wound approximately [id number] on LLE with visible anterolateral tibia muscle. No discharge, erythema, or swelling. Site covered in Kirlex. RLE with compression boot. 5/5 dorsiflexion/plantarflexion bilaterally of both LE. Normal sensation bilaterally of both LE.  Neuro: A/O x 3, CNs II-XII grossly intact, normal sensation, no focal deficits    I&O's Summary    26 Jul 2020 07:01  -  27 Jul 2020 07:00  --------------------------------------------------------  IN: 0 mL / OUT: 700 mL / NET: -700 mL        LABS:                        7.8    11.23 )-----------( 385      ( 27 Jul 2020 07:21 )             26.3     07-27    134<L>  |  99  |  15  ----------------------------<  239<H>  4.5   |  26  |  0.87    Ca    8.1<L>      27 Jul 2020 06:11          CAPILLARY BLOOD GLUCOSE      POCT Blood Glucose.: 245 mg/dL (27 Jul 2020 07:19)  POCT Blood Glucose.: 202 mg/dL (26 Jul 2020 22:00)  POCT Blood Glucose.: 180 mg/dL (26 Jul 2020 17:15)  POCT Blood Glucose.: 126 mg/dL (26 Jul 2020 11:46)        RADIOLOGY & ADDITIONAL STUDIES:

## 2020-07-27 NOTE — CHART NOTE - NSCHARTNOTEFT_GEN_A_CORE
Admitting Diagnosis:   Patient is a 65y old  Male who presents with a chief complaint of septic knee (27 Jul 2020 14:09)    PAST MEDICAL & SURGICAL HISTORY:  Diabetic neuropathy  STEMI (ST elevation myocardial infarction)  Diverticulitis  MRSA bacteremia  History of celiac disease  CHF (congestive heart failure): EF ~ 25%  HTN (hypertension)  Diabetes: on insulin pump  Blood clot due to device, implant, or graft: was on blood thinners  HLD (hyperlipidemia)  Osteoarthritis  Atherosclerosis of coronary artery: CAD (coronary artery disease)  Status post percutaneous transluminal coronary angioplasty: in 2012  History of open reduction and internal fixation (ORIF) procedure  Surgery, elective: Right shoulder  Surgery, elective: right knee wound debridement  S/P TKR (total knee replacement), right: with infection Mrsa   per pt he was cleared from MRSA infection  S/P CABG x 1: 2018  Stented coronary artery: 10/18 heart attack  INFERIOR WALL MI  Other postprocedural status: Fixation hardware in foot LEFT    Current Nutrition Order: CST CHO diet with evening snack and Glucerna Shakes BID (440 kcal, 20g protein, 400mL H2O)     PO Intake: Good (%) [ x  ]  Fair (50-75%) [   ] Poor (<25%) [   ]    GI Issues:   WDL, last BM 7/26  No n/v/d/c noted  No chewing/ swallowing difficulties noted    Pain:  8/10 right knee pain noted- well managed with pain regime    Skin Integrity:  Surgical incision, arelis score 19  DM ulcers: Left shin, Left second toe, Right foot  No edema present  No pressure ulcers noted    Labs:   07-27    134<L>  |  99  |  15  ----------------------------<  239<H>  4.5   |  26  |  0.87    Ca    8.1<L>      27 Jul 2020 06:11    CAPILLARY BLOOD GLUCOSE    POCT Blood Glucose.: 149 mg/dL (27 Jul 2020 14:25)  POCT Blood Glucose.: 245 mg/dL (27 Jul 2020 07:19)  POCT Blood Glucose.: 202 mg/dL (26 Jul 2020 22:00)  POCT Blood Glucose.: 180 mg/dL (26 Jul 2020 17:15)    Medications:  MEDICATIONS  (STANDING):  acetaminophen   Tablet .. 975 milliGRAM(s) Oral every 8 hours  aspirin enteric coated 81 milliGRAM(s) Oral two times a day  atorvastatin 40 milliGRAM(s) Oral at bedtime  chlorhexidine 2% Cloths 1 Application(s) Topical <User Schedule>  dextrose 5%. 1000 milliLiter(s) (50 mL/Hr) IV Continuous <Continuous>  dextrose 50% Injectable 12.5 Gram(s) IV Push once  dextrose 50% Injectable 25 Gram(s) IV Push once  dextrose 50% Injectable 25 Gram(s) IV Push once  digoxin     Tablet 0.125 milliGRAM(s) Oral daily  DULoxetine 90 milliGRAM(s) Oral daily  heparin   Injectable 5000 Unit(s) SubCutaneous every 8 hours  insulin glargine Injectable (LANTUS) 16 Unit(s) SubCutaneous at bedtime  insulin lispro (HumaLOG) corrective regimen sliding scale   SubCutaneous Before meals and at bedtime  insulin lispro Injectable (HumaLOG) 8 Unit(s) SubCutaneous three times a day before meals  metoprolol succinate ER 25 milliGRAM(s) Oral daily  nitroglycerin    2% Ointment 1 Inch(s) Transdermal two times a day  oxyCODONE  ER Tablet 10 milliGRAM(s) Oral every 12 hours  pantoprazole    Tablet 40 milliGRAM(s) Oral before breakfast  polyethylene glycol 3350 17 Gram(s) Oral daily  povidone iodine 5% Nasal Swab 1 Application(s) Both Nostrils once  prasugrel 10 milliGRAM(s) Oral daily  rifAMPin 300 milliGRAM(s) Oral every 12 hours  spironolactone 25 milliGRAM(s) Oral daily  tamsulosin 0.4 milliGRAM(s) Oral at bedtime  vancomycin  IVPB 1000 milliGRAM(s) IV Intermittent every 12 hours    MEDICATIONS  (PRN):  aluminum hydroxide/magnesium hydroxide/simethicone Suspension 30 milliLiter(s) Oral four times a day PRN Indigestion  bisacodyl Suppository 10 milliGRAM(s) Rectal daily PRN If no bowel movement by POD#2  cyclobenzaprine 10 milliGRAM(s) Oral three times a day PRN Muscle Spasm  dextrose 40% Gel 15 Gram(s) Oral once PRN Blood Glucose LESS THAN 70 milliGRAM(s)/deciliter  glucagon  Injectable 1 milliGRAM(s) IntraMuscular once PRN Glucose LESS THAN 70 milligrams/deciliter  HYDROmorphone  Injectable 1 milliGRAM(s) IV Push every 4 hours PRN Breakthrough pain  magnesium hydroxide Suspension 30 milliLiter(s) Oral daily PRN Constipation  morphine  - Injectable 4 milliGRAM(s) IV Push every 3 hours PRN Severe Pain (7 - 10)  ondansetron Injectable 4 milliGRAM(s) IV Push every 6 hours PRN Nausea and/or Vomiting  oxyCODONE    IR 5 milliGRAM(s) Oral every 3 hours PRN Mild Pain (1 - 3)  oxyCODONE    IR 10 milliGRAM(s) Oral every 3 hours PRN Moderate Pain (4 - 6)  senna 2 Tablet(s) Oral at bedtime PRN Constipation  sodium chloride 0.9% lock flush 10 milliLiter(s) IV Push every 1 hour PRN Pre/post blood products, medications, blood draw, and to maintain line patency    Admitted Anthropometrics:  Height: 74" Weight: 183lbs, IBW 190lbs+/-10%, %IBW 96%, BMI 23.5 kg/m2    Weight:  7/16 183lbs    Weight Change: UBW consistent with admission weight. Please cont to trend weights biweekly.     Nutrition Focused Physical Exam: Completed [   ]  Unable to complete [   ]- Unremarkable.     Estimated energy needs:    ABW (83kg) used for calculations as pt between % of IBW. Needs adjusted for advanced age and wound healing s/p I and D.   Kcal (25-30 kcal/kg): 0079-7593 kcal  Protein (1.2-1.4 g/kg pro): 100-116 g pro  Fluids (30-35 ml/kg): 4123-1807 ml    Subjective:   66 yo M with HTN, hyperlipidemia, type II DM with peripheral neuropathy, CAD s/p MIDCAB (2018)/VERONICA, HFrEF, AICD (2/20), pulmonary HTN with recurrent R knee PPJI, likely admitting with septic knee now s/p right knee arthroscopic I&D 7/17 with follow up I&D with antibiotic spacer 7/22. Pt remains on abx course for culture positive for MRSA with negative blood cultures (rifampin and vanco). S/p PICC placement 7/27. Per team, Waiting for clear demarcation of R knee wound before attempting repeat I&D. On assessment, pt is resting in bed without complaints. Currently on CST CHO diet with evening snack and Glucerna Shakes BID (440 kcal, 20g protein, 400mL H2O) tolerating PO well- pt consuming >75% of meals. Cont to encourage adequate PO intake. Reenforced nutrition wound healing guidelines and CST CHO diet- pt receptive. Hyperglycemia noted (POCT 245H) likely 2/2 infection and s/p I&D. Currently on lantus and humalog- adjusted as needed per team. Please see nutr recs below. RD to follow up per protocol.     Nutrition Diagnosis: Increased kcal, pro needs RT increased nutrient demand 2/2 wound healing AEB s/p  right knee arthroscopic I&D    [  ] No active nutrition diagnosis at this time  [  ] Current medical condition precludes nutrition intervention    Goal: Consistently meet >75% est needs    Recommendations:  1. Cont with CST CHO diet with evening snack  2. Cont with Glucerna Shakes BID (440 kcal, 20g protein, 400mL H2O)   3. Cont with wound care per team  4. Cont with adequate pain and bowel regime per team  5. RD diet education reenforcement prn  6. Monitor lytes and replete prn    Education: Reenforced nutrition wound healing guidelines- pt receptive.     Risk Level: High [   ] Moderate [ x  ] Low [   ].

## 2020-07-27 NOTE — CONSULT NOTE ADULT - CONSULT REASON
preoperative evaluation
Assistance with antibiotics
LLE non healing ulcer
Postop hemodynamic monitoring
preop
vascular access

## 2020-07-27 NOTE — PROCEDURE NOTE - NSPOSTCAREGUIDE_GEN_A_CORE
Instructed patient/caregiver regarding signs and symptoms of infection/Keep the cast/splint/dressing clean and dry/Instructed patient/caregiver to follow-up with primary care physician/Verbal/written post procedure instructions were given to patient/caregiver/Care for catheter as per unit/ICU protocols
Care for catheter as per unit/ICU protocols/Verbal/written post procedure instructions were given to patient/caregiver/Instructed patient/caregiver regarding signs and symptoms of infection/Keep the cast/splint/dressing clean and dry

## 2020-07-27 NOTE — PROGRESS NOTE ADULT - ATTENDING COMMENTS
Leukocytosis improving. Pain also ok. Right knee dusky area unchanged from yesterday, serosanguinous drainage from skin tear but no purulence. Left tibia wound also no significant change from yesterday.     Still may be possible to require right knee debridement and VAC this admission depending on the course of his soft tissue healing. Will try nitroglycerin paste for the left leg. PICC today.

## 2020-07-27 NOTE — CONSULT NOTE ADULT - CONSULT REQUESTED DATE/TIME
17-Jul-2020 08:10
17-Jul-2020 11:00
17-Jul-2020 17:23
22-Jul-2020 20:00
27-Jul-2020 11:43
17-Jul-2020 11:40

## 2020-07-27 NOTE — PROGRESS NOTE ADULT - SUBJECTIVE AND OBJECTIVE BOX
Orthopaedic Surgery Progress Note    Post-operative day #5 s/p right knee explant/spacer     Subjective:     Patient seen and examined. Patient comfortable without complaints, pain controlled.   Denies chest pain, shortness of breath    Objective:    Vital Signs Last 24 Hrs  T(C): 36.9 (07-27-20 @ 09:00), Max: 36.9 (07-27-20 @ 09:00)  T(F): 98.4 (07-27-20 @ 09:00), Max: 98.4 (07-27-20 @ 09:00)  HR: 80 (07-27-20 @ 09:00) (80 - 80)  BP: 116/62 (07-27-20 @ 09:00) (116/62 - 116/62)  BP(mean): --  RR: 18 (07-27-20 @ 09:00) (18 - 18)  SpO2: 98% (07-27-20 @ 09:00) (98% - 98%)  AVSS    PE:  General: Patient alert and oriented, NAD  Dressing: Clean/dry/intact kerlix around right knee   Skin well perfused  Firing TA/GS RLE                             7.8    11.23 )-----------( 385      ( 27 Jul 2020 07:21 )             26.3   27 Jul 2020 06:11    134    |  99     |  15     ----------------------------<  239    4.5     |  26     |  0.87     Ca    8.1        27 Jul 2020 06:11        A/P: 65yMale with right periprosthetic infection POD#5 s/p above procedure   1. Pain control as needed  2. DVT prophylaxis: ASA/effient/heparin   3. PT, weight-bearing status:  WBAT, PT recommending PINKY   4. Discussed with lili Rosenthal for patient to get PICC line today; PICC team aware     Ortho Pager [phone]

## 2020-07-27 NOTE — PROGRESS NOTE ADULT - SUBJECTIVE AND OBJECTIVE BOX
INTERVAL HPI/OVERNIGHT EVENTS:    Patient was seen and examined at bedside. Feels well.      CONSTITUTIONAL:  Negative fever or chills, feels well, good appetite  EYES:  Negative  blurry vision or double vision  CARDIOVASCULAR:  Negative for chest pain or palpitations  RESPIRATORY:  Negative for cough, wheezing, or SOB   GASTROINTESTINAL:  Negative for nausea, vomiting, diarrhea, constipation, or abdominal pain  GENITOURINARY:  Negative frequency, urgency or dysuria  NEUROLOGIC:  No headache, confusion, dizziness, lightheadedness      ANTIBIOTICS/RELEVANT:    MEDICATIONS  (STANDING):  acetaminophen   Tablet .. 975 milliGRAM(s) Oral every 8 hours  aspirin enteric coated 81 milliGRAM(s) Oral two times a day  atorvastatin 40 milliGRAM(s) Oral at bedtime  chlorhexidine 2% Cloths 1 Application(s) Topical <User Schedule>  dextrose 5%. 1000 milliLiter(s) (50 mL/Hr) IV Continuous <Continuous>  dextrose 50% Injectable 12.5 Gram(s) IV Push once  dextrose 50% Injectable 25 Gram(s) IV Push once  dextrose 50% Injectable 25 Gram(s) IV Push once  digoxin     Tablet 0.125 milliGRAM(s) Oral daily  DULoxetine 90 milliGRAM(s) Oral daily  heparin   Injectable 5000 Unit(s) SubCutaneous every 8 hours  insulin glargine Injectable (LANTUS) 16 Unit(s) SubCutaneous at bedtime  insulin lispro (HumaLOG) corrective regimen sliding scale   SubCutaneous Before meals and at bedtime  insulin lispro Injectable (HumaLOG) 8 Unit(s) SubCutaneous three times a day before meals  metoprolol succinate ER 25 milliGRAM(s) Oral daily  nitroglycerin    2% Ointment 1 Inch(s) Transdermal two times a day  oxyCODONE  ER Tablet 10 milliGRAM(s) Oral every 12 hours  pantoprazole    Tablet 40 milliGRAM(s) Oral before breakfast  polyethylene glycol 3350 17 Gram(s) Oral daily  povidone iodine 5% Nasal Swab 1 Application(s) Both Nostrils once  prasugrel 10 milliGRAM(s) Oral daily  rifAMPin 300 milliGRAM(s) Oral every 12 hours  spironolactone 25 milliGRAM(s) Oral daily  tamsulosin 0.4 milliGRAM(s) Oral at bedtime  vancomycin  IVPB 1000 milliGRAM(s) IV Intermittent every 12 hours    MEDICATIONS  (PRN):  aluminum hydroxide/magnesium hydroxide/simethicone Suspension 30 milliLiter(s) Oral four times a day PRN Indigestion  bisacodyl Suppository 10 milliGRAM(s) Rectal daily PRN If no bowel movement by POD#2  cyclobenzaprine 10 milliGRAM(s) Oral three times a day PRN Muscle Spasm  dextrose 40% Gel 15 Gram(s) Oral once PRN Blood Glucose LESS THAN 70 milliGRAM(s)/deciliter  glucagon  Injectable 1 milliGRAM(s) IntraMuscular once PRN Glucose LESS THAN 70 milligrams/deciliter  HYDROmorphone  Injectable 1 milliGRAM(s) IV Push every 4 hours PRN Breakthrough pain  magnesium hydroxide Suspension 30 milliLiter(s) Oral daily PRN Constipation  morphine  - Injectable 4 milliGRAM(s) IV Push every 3 hours PRN Severe Pain (7 - 10)  ondansetron Injectable 4 milliGRAM(s) IV Push every 6 hours PRN Nausea and/or Vomiting  oxyCODONE    IR 5 milliGRAM(s) Oral every 3 hours PRN Mild Pain (1 - 3)  oxyCODONE    IR 10 milliGRAM(s) Oral every 3 hours PRN Moderate Pain (4 - 6)  senna 2 Tablet(s) Oral at bedtime PRN Constipation  sodium chloride 0.9% lock flush 10 milliLiter(s) IV Push every 1 hour PRN Pre/post blood products, medications, blood draw, and to maintain line patency        Vital Signs Last 24 Hrs  T(C): 36.8 (27 Jul 2020 14:00), Max: 36.9 (26 Jul 2020 16:36)  T(F): 98.2 (27 Jul 2020 14:00), Max: 98.4 (26 Jul 2020 16:36)  HR: 77 (27 Jul 2020 14:00) (77 - 81)  BP: 92/53 (27 Jul 2020 14:00) (92/53 - 119/51)  BP(mean): --  RR: 16 (27 Jul 2020 14:00) (16 - 20)  SpO2: 97% (27 Jul 2020 14:00) (96% - 98%)    PHYSICAL EXAM:  Constitutional:  non-toxic, no distress  Eyes:  no icterus   Ear/Nose/Throat: no oral lesion, no sinus tenderness on percussion	  Neck:no JVD, no lymphadenopathy, supple  Respiratory: CTA colton  Cardiovascular: S1S2 RRR, no murmurs  Gastrointestinal:soft, (+) BS, no HSM  Extremities: right knee scar is clear   Vascular: DP Pulse:	right normal; left normal      LABS:                        7.8    11.23 )-----------( 385      ( 27 Jul 2020 07:21 )             26.3     07-27    134<L>  |  99  |  15  ----------------------------<  239<H>  4.5   |  26  |  0.87    Ca    8.1<L>      27 Jul 2020 06:11            MICROBIOLOGY:    Culture - Acid Fast - Tissue w/Smear (07.22.20 @ 22:11)    Specimen Source: .Tissue Right Knee Synovium    Acid Fast Bacilli Smear:   No acid fast bacilli seen by fluorochrome stain    Culture Results:   Culture is being performed.    Culture - Tissue with Gram Stain (07.22.20 @ 16:04)    Gram Stain:   No organisms seen  Rare WBC's    Specimen Source: .Tissue Right Knee Synovium    Culture Results:   No growth to date.        RADIOLOGY & ADDITIONAL STUDIES:

## 2020-07-27 NOTE — PROGRESS NOTE ADULT - SUBJECTIVE AND OBJECTIVE BOX
Interval Events: Reviewed  Patient seen and examined at bedside.    Patient is a 65y old  Male who presents with a chief complaint of prosthetic knee infection (27 Jul 2020 16:16)    he is weak and tired  PAST MEDICAL & SURGICAL HISTORY:  Diabetic neuropathy  STEMI (ST elevation myocardial infarction)  Diverticulitis  MRSA bacteremia  History of celiac disease  CHF (congestive heart failure): EF ~ 25%  HTN (hypertension)  Diabetes: on insulin pump  Blood clot due to device, implant, or graft: was on blood thinners  HLD (hyperlipidemia)  Osteoarthritis  Atherosclerosis of coronary artery: CAD (coronary artery disease)  Status post percutaneous transluminal coronary angioplasty: in 2012  History of open reduction and internal fixation (ORIF) procedure  Surgery, elective: Right shoulder  Surgery, elective: right knee wound debridement  S/P TKR (total knee replacement), right: with infection Mrsa   per pt he was cleared from MRSA infection  S/P CABG x 1: 2018  Stented coronary artery: 10/18 heart attack  INFERIOR WALL MI  Other postprocedural status: Fixation hardware in foot LEFT      MEDICATIONS:  Pulmonary:    Antimicrobials:  rifAMPin 300 milliGRAM(s) Oral every 12 hours  vancomycin  IVPB 1000 milliGRAM(s) IV Intermittent every 12 hours    Anticoagulants:  aspirin enteric coated 81 milliGRAM(s) Oral two times a day  heparin   Injectable 5000 Unit(s) SubCutaneous every 8 hours  prasugrel 10 milliGRAM(s) Oral daily    Cardiac:  digoxin     Tablet 0.125 milliGRAM(s) Oral daily  metoprolol succinate ER 25 milliGRAM(s) Oral daily  nitroglycerin    2% Ointment 1 Inch(s) Transdermal two times a day  spironolactone 25 milliGRAM(s) Oral daily  tamsulosin 0.4 milliGRAM(s) Oral at bedtime      Allergies    ACE inhibitors (Hives)  carvedilol (Other)  enalapril (Hives)  Entresto (Other)    Intolerances        Vital Signs Last 24 Hrs  T(C): 36.7 (27 Jul 2020 17:10), Max: 36.9 (27 Jul 2020 09:00)  T(F): 98 (27 Jul 2020 17:10), Max: 98.4 (27 Jul 2020 09:00)  HR: 79 (27 Jul 2020 17:10) (77 - 81)  BP: 97/55 (27 Jul 2020 17:10) (92/53 - 119/51)  BP(mean): --  RR: 16 (27 Jul 2020 17:10) (16 - 20)  SpO2: 97% (27 Jul 2020 17:10) (97% - 98%)    07-26 @ 07:01 - 07-27 @ 07:00  --------------------------------------------------------  IN: 0 mL / OUT: 700 mL / NET: -700 mL    07-27 @ 07:01 - 07-27 @ 17:37  --------------------------------------------------------  IN: 0 mL / OUT: 700 mL / NET: -700 mL          Review of Systems:   •	General: negative  •	Skin/Breast: negative  •	Ophthalmologic: negative  •	ENMT: negative  •	Respiratory and Thorax: negative  •	Cardiovascular: negative  •	Gastrointestinal: negative  •	Genitourinary: negative  •	Musculoskeletal: negative  •	Neurological: negative  •	Psychiatric: negative  •	Hematology/Lymphatics: negative  •	Endocrine: negative  •	Allergic/Immunologic: negative    Physical Exam:   • Constitutional:	Well-developed, well nourished  • Eyes:	EOMI; PERRL; no drainage or redness  • ENMT:	No oral lesions; no gross abnormalities  • Neck	No bruits; no thyromegaly or nodules  • Breasts:	not examined  • Back:	No deformity or limitation of movement  • Respiratory:	Breath Sounds equal & clear to percussion & auscultation, no accessory muscle use  • Cardiovascular:	Regular rate & rhythm, normal S1, S2; no murmurs, gallops or rubs; no S3, S4  • Gastrointestinal:	Soft, non-tender, no hepatosplenomegaly, normal bowel sounds  • Genitourinary:	not examined  • Rectal: not examined  • Extremities:	No cyanosis, clubbing or edema  • Vascular:	Equal and normal pulses (carotid, femoral, dorsalis pedis)  • Neurologica:l	not examined  • Skin:	No lesions; no rash  • Lymph Nodes:	No lymphadedenopathy  • Musculoskeletal:	No joint pain, swelling or deformity; no limitation of movement        LABS:      CBC Full  -  ( 27 Jul 2020 07:21 )  WBC Count : 11.23 K/uL  RBC Count : 3.29 M/uL  Hemoglobin : 7.8 g/dL  Hematocrit : 26.3 %  Platelet Count - Automated : 385 K/uL  Mean Cell Volume : 79.9 fl  Mean Cell Hemoglobin : 23.7 pg  Mean Cell Hemoglobin Concentration : 29.7 gm/dL  Auto Neutrophil # : x  Auto Lymphocyte # : x  Auto Monocyte # : x  Auto Eosinophil # : x  Auto Basophil # : x  Auto Neutrophil % : x  Auto Lymphocyte % : x  Auto Monocyte % : x  Auto Eosinophil % : x  Auto Basophil % : x    07-27    134<L>  |  99  |  15  ----------------------------<  239<H>  4.5   |  26  |  0.87    Ca    8.1<L>      27 Jul 2020 06:11                          RADIOLOGY & ADDITIONAL STUDIES (The following images were personally reviewed):  Loja:                                     No  Urine output:                       adequate  DVT prophylaxis:                 Yes  Flattus:                                  Yes  Bowel movement:              No

## 2020-07-27 NOTE — PROGRESS NOTE ADULT - SUBJECTIVE AND OBJECTIVE BOX
Ortho Progress Note    Pt's pain controlled.  PICC placement today. Denies CP, SOB, N/V, numbness/tingling.    Vital Signs Last 24 Hrs  T(C): 36.7 (27 Jul 2020 04:46), Max: 37 (26 Jul 2020 14:06)  T(F): 98.1 (27 Jul 2020 04:46), Max: 98.6 (26 Jul 2020 14:06)  HR: 81 (27 Jul 2020 04:46) (72 - 81)  BP: 119/51 (27 Jul 2020 04:46) (101/57 - 119/51)  BP(mean): --  RR: 20 (27 Jul 2020 04:46) (17 - 20)  SpO2: 97% (27 Jul 2020 04:46) (95% - 97%)    Physical Exam:  General: Resting comfortably in bed. AAOx3. NAD.  Extremities:        LLE: Dressing in place LLE, diabetic ulcer being managed by vascular team. SILT L2-S1 distribution, symmetric. TA/EHL/FHL/GS motor intact. Feet cool b/l       RLE: Gauze/Xeroform dressing C/D/I. SILT L2-S1 distribution, symmetric. TA/EHL/FHL/GS motor intact. Feet cool b/l, cap refill <3 sec                                       7.8    11.23 )-----------( 385      ( 27 Jul 2020 07:21 )             26.3       A/P: 65yMale s/p R total knee explant, antibiotic spacer 7/22, POD#4  - Stable  - Pain Control  - DVT ppx: ASA/Effient  - Appreciate ID recs: on Vanco, rifampin, will need PICC line   - Appreciate ID recs -- vanc + rifampin  - Continue to trend WBC/ESR/CRP  - Nitroglycerin paste to left tibia for nonhealing diabetic ulcer wound  - Waiting for clear demarcation of R knee wound before attempting repeat I&D  - F/u blood cultures  - PT, WBS: TTWB RLE    Ortho Pager [phone]

## 2020-07-27 NOTE — CONSULT NOTE ADULT - SUBJECTIVE AND OBJECTIVE BOX
Vascular Access Service Consult Note    65yMaleHEALTH ISSUES - PROBLEM Dx:  Anemia due to blood loss: Anemia due to blood loss  Postoperative examination: Postoperative examination  Preoperative clearance: Preoperative clearance  Atherosclerosis of native coronary artery of native heart without angina pectoris: Atherosclerosis of native coronary artery of native heart without angina pectoris  Pure hypertriglyceridemia: Pure hypertriglyceridemia  Essential hypertension: Essential hypertension  MRSA bacteremia: MRSA bacteremia  Diabetic polyneuropathy associated with type 1 diabetes mellitus: Diabetic polyneuropathy associated with type 1 diabetes mellitus  Type 1 diabetes mellitus without complication: Type 1 diabetes mellitus without complication  Pyogenic arthritis of right knee joint, due to unspecified organism: Pyogenic arthritis of right knee joint, due to unspecified organism  Sepsis: Sepsis             Diagnosis: right knee infection    Indications for Vascular Access (Check all that apply)  [ x ]  Antibiotic Therapy       Antibiotic Prescribed:       vancomycin x 6 weeks                                                                                  [  ]  IV Hydration  [  ]  Total Parenteral Nutrition  [  ]  Chemotherapy  [  ]  Difficult Venous Access  [  ]  CVP monitoring  [  ]  Medications with high potential for tissue necrosis on extravasation  [  ]  Other    Screening (Check all that apply)  Previous Radiation to chest  [  ] Yes      [ x ]  No  Breast Cancer                          [  ] Left     [  ]  Right    [ x ]  No  Lymph Node Dissection         [  ] Left     [  ]  Right    [ x ]  No  Pacemaker or ICD                   [  ] Left     [  ]  Right    [ x ]  No  Upper Extremity DVT             [  ] Left     [  ]  Right    [ x ]  No  Chronic Kidney Disease         [  ]  Yes     [x  ]  No  Hemodialysis                           [  ]  Yes     [  x]  No  AV Fistula/ Graft                     [  ]  Left    [  ]  Right    [x  ]  No  Temp>101F in past 24 H       [  ]  Yes     [ x ]  No  H/O PICC/Midline                   [  ]  Yes     [ x ]  No    Lab data:                        7.8    11.23 )-----------( 385      ( 27 Jul 2020 07:21 )             26.3     07-27    134<L>  |  99  |  15  ----------------------------<  239<H>  4.5   |  26  |  0.87    Ca    8.1<L>      27 Jul 2020 06:11                I have reviewed the chart, interviewed and examined the patient and determined that this patient:  [  x] Is a candidate for a PICC line  [  ] Is a candidate for a Midline  [  ] Is not a candidate for vascular access device (reason)    Lumens:    [ x ] Single  [  ] Double Vascular Access Service Consult Note    65yMaleHEALTH ISSUES - PROBLEM Dx:  Anemia due to blood loss: Anemia due to blood loss  Postoperative examination: Postoperative examination  Preoperative clearance: Preoperative clearance  Atherosclerosis of native coronary artery of native heart without angina pectoris: Atherosclerosis of native coronary artery of native heart without angina pectoris  Pure hypertriglyceridemia: Pure hypertriglyceridemia  Essential hypertension: Essential hypertension  MRSA bacteremia: MRSA bacteremia  Diabetic polyneuropathy associated with type 1 diabetes mellitus: Diabetic polyneuropathy associated with type 1 diabetes mellitus  Type 1 diabetes mellitus without complication: Type 1 diabetes mellitus without complication  Pyogenic arthritis of right knee joint, due to unspecified organism: Pyogenic arthritis of right knee joint, due to unspecified organism  Sepsis: Sepsis             Diagnosis: right knee infection    Indications for Vascular Access (Check all that apply)  [ x ]  Antibiotic Therapy       Antibiotic Prescribed:       vancomycin x 6 weeks                                                                                  [  ]  IV Hydration  [  ]  Total Parenteral Nutrition  [  ]  Chemotherapy  [  ]  Difficult Venous Access  [  ]  CVP monitoring  [  ]  Medications with high potential for tissue necrosis on extravasation  [  ]  Other    Screening (Check all that apply)  Previous Radiation to chest  [  ] Yes      [ x ]  No  Breast Cancer                          [  ] Left     [  ]  Right    [ x ]  No  Lymph Node Dissection         [  ] Left     [  ]  Right    [ x ]  No  Pacemaker or ICD                   [ x ] Left     [  ]  Right    [  ]  No  Upper Extremity DVT             [  ] Left     [  ]  Right    [ x ]  No  Chronic Kidney Disease         [  ]  Yes     [x  ]  No  Hemodialysis                           [  ]  Yes     [  x]  No  AV Fistula/ Graft                     [  ]  Left    [  ]  Right    [x  ]  No  Temp>101F in past 24 H       [  ]  Yes     [ x ]  No  H/O PICC/Midline                   [  ]  Yes     [ x ]  No    Lab data:                        7.8    11.23 )-----------( 385      ( 27 Jul 2020 07:21 )             26.3     07-27    134<L>  |  99  |  15  ----------------------------<  239<H>  4.5   |  26  |  0.87    Ca    8.1<L>      27 Jul 2020 06:11                I have reviewed the chart, interviewed and examined the patient and determined that this patient:  [  x] Is a candidate for a PICC line  [  ] Is a candidate for a Midline  [  ] Is not a candidate for vascular access device (reason)    Lumens:    [ x ] Single  [  ] Double

## 2020-07-28 LAB
GLUCOSE BLDC GLUCOMTR-MCNC: 139 MG/DL — HIGH (ref 70–99)
GLUCOSE BLDC GLUCOMTR-MCNC: 162 MG/DL — HIGH (ref 70–99)
GLUCOSE BLDC GLUCOMTR-MCNC: 164 MG/DL — HIGH (ref 70–99)
GLUCOSE BLDC GLUCOMTR-MCNC: 180 MG/DL — HIGH (ref 70–99)
SURGICAL PATHOLOGY STUDY: SIGNIFICANT CHANGE UP
VANCOMYCIN TROUGH SERPL-MCNC: 24 UG/ML — HIGH (ref 10–20)

## 2020-07-28 PROCEDURE — 73590 X-RAY EXAM OF LOWER LEG: CPT | Mod: 26,RT

## 2020-07-28 PROCEDURE — 73560 X-RAY EXAM OF KNEE 1 OR 2: CPT | Mod: 26,RT

## 2020-07-28 PROCEDURE — 73552 X-RAY EXAM OF FEMUR 2/>: CPT | Mod: 26,RT

## 2020-07-28 PROCEDURE — 99232 SBSQ HOSP IP/OBS MODERATE 35: CPT | Mod: GC

## 2020-07-28 PROCEDURE — 99232 SBSQ HOSP IP/OBS MODERATE 35: CPT

## 2020-07-28 RX ORDER — VANCOMYCIN HCL 1 G
750 VIAL (EA) INTRAVENOUS EVERY 12 HOURS
Refills: 0 | Status: DISCONTINUED | OUTPATIENT
Start: 2020-07-29 | End: 2020-08-10

## 2020-07-28 RX ADMIN — OXYCODONE HYDROCHLORIDE 10 MILLIGRAM(S): 5 TABLET ORAL at 05:52

## 2020-07-28 RX ADMIN — OXYCODONE HYDROCHLORIDE 10 MILLIGRAM(S): 5 TABLET ORAL at 10:00

## 2020-07-28 RX ADMIN — Medication 975 MILLIGRAM(S): at 13:32

## 2020-07-28 RX ADMIN — MORPHINE SULFATE 4 MILLIGRAM(S): 50 CAPSULE, EXTENDED RELEASE ORAL at 22:30

## 2020-07-28 RX ADMIN — OXYCODONE HYDROCHLORIDE 10 MILLIGRAM(S): 5 TABLET ORAL at 07:24

## 2020-07-28 RX ADMIN — Medication 975 MILLIGRAM(S): at 21:50

## 2020-07-28 RX ADMIN — Medication 1 APPLICATION(S): at 22:11

## 2020-07-28 RX ADMIN — INSULIN GLARGINE 16 UNIT(S): 100 INJECTION, SOLUTION SUBCUTANEOUS at 21:49

## 2020-07-28 RX ADMIN — PRASUGREL 10 MILLIGRAM(S): 5 TABLET, FILM COATED ORAL at 09:38

## 2020-07-28 RX ADMIN — Medication 2: at 17:36

## 2020-07-28 RX ADMIN — CHLORHEXIDINE GLUCONATE 1 APPLICATION(S): 213 SOLUTION TOPICAL at 06:48

## 2020-07-28 RX ADMIN — Medication 250 MILLIGRAM(S): at 02:24

## 2020-07-28 RX ADMIN — Medication 8 UNIT(S): at 07:23

## 2020-07-28 RX ADMIN — Medication 2: at 13:24

## 2020-07-28 RX ADMIN — OXYCODONE HYDROCHLORIDE 10 MILLIGRAM(S): 5 TABLET ORAL at 03:38

## 2020-07-28 RX ADMIN — Medication 975 MILLIGRAM(S): at 13:54

## 2020-07-28 RX ADMIN — Medication 975 MILLIGRAM(S): at 22:50

## 2020-07-28 RX ADMIN — OXYCODONE HYDROCHLORIDE 10 MILLIGRAM(S): 5 TABLET ORAL at 13:32

## 2020-07-28 RX ADMIN — DULOXETINE HYDROCHLORIDE 90 MILLIGRAM(S): 30 CAPSULE, DELAYED RELEASE ORAL at 09:38

## 2020-07-28 RX ADMIN — SPIRONOLACTONE 25 MILLIGRAM(S): 25 TABLET, FILM COATED ORAL at 06:47

## 2020-07-28 RX ADMIN — Medication 1 INCH(S): at 22:00

## 2020-07-28 RX ADMIN — Medication 1 INCH(S): at 21:04

## 2020-07-28 RX ADMIN — Medication 81 MILLIGRAM(S): at 05:53

## 2020-07-28 RX ADMIN — OXYCODONE HYDROCHLORIDE 10 MILLIGRAM(S): 5 TABLET ORAL at 17:05

## 2020-07-28 RX ADMIN — TAMSULOSIN HYDROCHLORIDE 0.4 MILLIGRAM(S): 0.4 CAPSULE ORAL at 21:50

## 2020-07-28 RX ADMIN — Medication 975 MILLIGRAM(S): at 05:54

## 2020-07-28 RX ADMIN — Medication 1 INCH(S): at 09:37

## 2020-07-28 RX ADMIN — Medication 1 INCH(S): at 10:55

## 2020-07-28 RX ADMIN — ATORVASTATIN CALCIUM 40 MILLIGRAM(S): 80 TABLET, FILM COATED ORAL at 21:50

## 2020-07-28 RX ADMIN — Medication 81 MILLIGRAM(S): at 17:05

## 2020-07-28 RX ADMIN — MORPHINE SULFATE 4 MILLIGRAM(S): 50 CAPSULE, EXTENDED RELEASE ORAL at 22:00

## 2020-07-28 RX ADMIN — Medication 8 UNIT(S): at 17:36

## 2020-07-28 RX ADMIN — Medication 2: at 07:23

## 2020-07-28 RX ADMIN — HEPARIN SODIUM 5000 UNIT(S): 5000 INJECTION INTRAVENOUS; SUBCUTANEOUS at 07:01

## 2020-07-28 RX ADMIN — Medication 0.12 MILLIGRAM(S): at 05:53

## 2020-07-28 RX ADMIN — OXYCODONE HYDROCHLORIDE 10 MILLIGRAM(S): 5 TABLET ORAL at 13:55

## 2020-07-28 RX ADMIN — Medication 25 MILLIGRAM(S): at 06:47

## 2020-07-28 RX ADMIN — PANTOPRAZOLE SODIUM 40 MILLIGRAM(S): 20 TABLET, DELAYED RELEASE ORAL at 07:01

## 2020-07-28 RX ADMIN — OXYCODONE HYDROCHLORIDE 10 MILLIGRAM(S): 5 TABLET ORAL at 09:37

## 2020-07-28 RX ADMIN — HEPARIN SODIUM 5000 UNIT(S): 5000 INJECTION INTRAVENOUS; SUBCUTANEOUS at 15:01

## 2020-07-28 RX ADMIN — OXYCODONE HYDROCHLORIDE 10 MILLIGRAM(S): 5 TABLET ORAL at 17:30

## 2020-07-28 RX ADMIN — HEPARIN SODIUM 5000 UNIT(S): 5000 INJECTION INTRAVENOUS; SUBCUTANEOUS at 23:11

## 2020-07-28 RX ADMIN — OXYCODONE HYDROCHLORIDE 10 MILLIGRAM(S): 5 TABLET ORAL at 03:03

## 2020-07-28 RX ADMIN — Medication 975 MILLIGRAM(S): at 07:03

## 2020-07-28 NOTE — PROGRESS NOTE ADULT - SUBJECTIVE AND OBJECTIVE BOX
Ortho Progress Note    Pt's pain controlled.  PICC placed yesterday.  Denies CP, SOB, N/V, numbness/tingling.    Vital Signs Last 24 Hrs  T(C): 36.7 (28 Jul 2020 08:26), Max: 36.9 (27 Jul 2020 09:00)  T(F): 98.1 (28 Jul 2020 08:26), Max: 98.4 (27 Jul 2020 09:00)  HR: 77 (28 Jul 2020 08:26) (72 - 82)  BP: 104/60 (28 Jul 2020 08:26) (92/53 - 116/62)  BP(mean): --  RR: 19 (28 Jul 2020 08:26) (16 - 20)  SpO2: 98% (28 Jul 2020 08:26) (97% - 98%)    Physical Exam:  General: Resting comfortably in bed. AAOx3. NAD.  Extremities:        LLE: Dressing in place LLE, diabetic ulcer being managed by vascular team. Nitropaste twice daily SILT L2-S1 distribution, symmetric. TA/EHL/FHL/GS motor intact. Feet cool b/l       RLE: Gauze/Silvedine dressing C/D/I. SILT L2-S1 distribution, symmetric. TA/EHL/FHL/GS motor intact. Feet cool b/l, cap refill <3 sec                               7.8    11.23 )-----------( 385      ( 27 Jul 2020 07:21 )             26.3                          A/P: 65yMale s/p R total knee explant, antibiotic spacer 7/22  - Stable  - Pain Control  - DVT ppx: ASA/Effient  - Appreciate ID recs: on Vanco, rifampin for 6 weeks from date of last procedure  - Appreciate ID recs -- vanc + rifampin  - Continue to trend WBC/ESR/CRP  - Nitroglycerin paste to left tibia for nonhealing diabetic ulcer wound  - Silvedene for RLE wound twice daily  - Waiting for clear demarcation of R knee wound before attempting repeat I&D  - F/u blood cultures  - PT, WBS: TTWB RLE    Ortho Pager [phone]

## 2020-07-28 NOTE — PROGRESS NOTE ADULT - SUBJECTIVE AND OBJECTIVE BOX
Interval Events: Reviewed  Patient seen and examined at bedside.    Patient is a 65y old  Male who presents with a chief complaint of septic knee (28 Jul 2020 11:21)    he is doing OK and had long discussion with ortho and plastics  PAST MEDICAL & SURGICAL HISTORY:  Diabetic neuropathy  STEMI (ST elevation myocardial infarction)  Diverticulitis  MRSA bacteremia  History of celiac disease  CHF (congestive heart failure): EF ~ 25%  HTN (hypertension)  Diabetes: on insulin pump  Blood clot due to device, implant, or graft: was on blood thinners  HLD (hyperlipidemia)  Osteoarthritis  Atherosclerosis of coronary artery: CAD (coronary artery disease)  Status post percutaneous transluminal coronary angioplasty: in 2012  History of open reduction and internal fixation (ORIF) procedure  Surgery, elective: Right shoulder  Surgery, elective: right knee wound debridement  S/P TKR (total knee replacement), right: with infection Mrsa   per pt he was cleared from MRSA infection  S/P CABG x 1: 2018  Stented coronary artery: 10/18 heart attack  INFERIOR WALL MI  Other postprocedural status: Fixation hardware in foot LEFT      MEDICATIONS:  Pulmonary:    Antimicrobials:  rifAMPin 300 milliGRAM(s) Oral every 12 hours    Anticoagulants:  aspirin enteric coated 81 milliGRAM(s) Oral two times a day  heparin   Injectable 5000 Unit(s) SubCutaneous every 8 hours  prasugrel 10 milliGRAM(s) Oral daily    Cardiac:  digoxin     Tablet 0.125 milliGRAM(s) Oral daily  metoprolol succinate ER 25 milliGRAM(s) Oral daily  nitroglycerin    2% Ointment 1 Inch(s) Transdermal two times a day  spironolactone 25 milliGRAM(s) Oral daily  tamsulosin 0.4 milliGRAM(s) Oral at bedtime      Allergies    ACE inhibitors (Hives)  carvedilol (Other)  enalapril (Hives)  Entresto (Other)    Intolerances        Vital Signs Last 24 Hrs  T(C): 36.7 (28 Jul 2020 16:25), Max: 36.8 (28 Jul 2020 06:00)  T(F): 98 (28 Jul 2020 16:25), Max: 98.3 (28 Jul 2020 06:00)  HR: 81 (28 Jul 2020 16:25) (77 - 82)  BP: 109/64 (28 Jul 2020 16:25) (100/56 - 112/56)  BP(mean): --  RR: 18 (28 Jul 2020 16:25) (18 - 20)  SpO2: 98% (28 Jul 2020 16:25) (97% - 99%)    07-27 @ 07:01 - 07-28 @ 07:00  --------------------------------------------------------  IN: 250 mL / OUT: 950 mL / NET: -700 mL    07-28 @ 07:01 - 07-28 @ 21:55  --------------------------------------------------------  IN: 0 mL / OUT: 950 mL / NET: -950 mL          Review of Systems:   •	General: negative  •	Skin/Breast: negative  •	Ophthalmologic: negative  •	ENMT: negative  •	Respiratory and Thorax: negative  •	Cardiovascular: negative  •	Gastrointestinal: negative  •	Genitourinary: negative  •	Musculoskeletal: negative  •	Neurological: negative  •	Psychiatric: negative  •	Hematology/Lymphatics: negative  •	Endocrine: negative  •	Allergic/Immunologic: negative    Physical Exam:   • Constitutional:	Well-developed, well nourished  • Eyes:	EOMI; PERRL; no drainage or redness  • ENMT:	No oral lesions; no gross abnormalities  • Neck	No bruits; no thyromegaly or nodules  • Breasts:	not examined  • Back:	No deformity or limitation of movement  • Respiratory:	Breath Sounds equal & clear to percussion & auscultation, no accessory muscle use  • Cardiovascular:	Regular rate & rhythm, normal S1, S2; no murmurs, gallops or rubs; no S3, S4  • Gastrointestinal:	Soft, non-tender, no hepatosplenomegaly, normal bowel sounds  • Genitourinary:	not examined  • Rectal: not examined  • Extremities:	No cyanosis, clubbing or edema  • Vascular:	Equal and normal pulses (carotid, femoral, dorsalis pedis)  • Neurologica:l	not examined  • Skin:	No lesions; no rash  • Lymph Nodes:	No lymphadedenopathy  • Musculoskeletal:	No joint pain, swelling or deformity; no limitation of movement        LABS:      CBC Full  -  ( 27 Jul 2020 07:21 )  WBC Count : 11.23 K/uL  RBC Count : 3.29 M/uL  Hemoglobin : 7.8 g/dL  Hematocrit : 26.3 %  Platelet Count - Automated : 385 K/uL  Mean Cell Volume : 79.9 fl  Mean Cell Hemoglobin : 23.7 pg  Mean Cell Hemoglobin Concentration : 29.7 gm/dL  Auto Neutrophil # : x  Auto Lymphocyte # : x  Auto Monocyte # : x  Auto Eosinophil # : x  Auto Basophil # : x  Auto Neutrophil % : x  Auto Lymphocyte % : x  Auto Monocyte % : x  Auto Eosinophil % : x  Auto Basophil % : x    07-27    134<L>  |  99  |  15  ----------------------------<  239<H>  4.5   |  26  |  0.87    Ca    8.1<L>      27 Jul 2020 06:11                          RADIOLOGY & ADDITIONAL STUDIES (The following images were personally reviewed):  Loja:                                     No  Urine output:                       adequate  DVT prophylaxis:                 Yes  Flattus:                                  Yes  Bowel movement:              No

## 2020-07-28 NOTE — PROGRESS NOTE ADULT - ATTENDING COMMENTS
Complaining of proximal tibial pain when standing and attempting ambulation even when compliant with TTWB status. Otherwise no new complaints.    Right knee incision about same as yesterday, serosanguinous drainage from pinpoint tear at lateral superior aspect, no purulence, demarcation line still not clear  Left tibia wound base still clean-appearing, not undermined, no surrounding erythema or purulence    Had a discussion with Dr. Bowen about coverage options yesterday evening. Left tibia wound may be candidate for synthetic coverage. Right knee still needs more time to demarcate. Medial gastroc coverage should be definitive at time of final reimplantation.    Will continue monitoring wounds this admission while continuing abx. Repeat right knee XRs today to ensure no fracture or spacer dysfunction.

## 2020-07-28 NOTE — PROGRESS NOTE ADULT - SUBJECTIVE AND OBJECTIVE BOX
Patient was seen and examined at bedside. No acute event overnight. No complaints.    Vital Signs Last 24 Hrs  T(C): 36.7 (28 Jul 2020 08:26), Max: 36.8 (27 Jul 2020 14:00)  T(F): 98.1 (28 Jul 2020 08:26), Max: 98.3 (28 Jul 2020 06:00)  HR: 77 (28 Jul 2020 08:26) (72 - 82)  BP: 104/60 (28 Jul 2020 08:26) (92/53 - 112/56)  BP(mean): --  RR: 19 (28 Jul 2020 08:26) (16 - 20)  SpO2: 98% (28 Jul 2020 08:26) (97% - 98%)    Physical Exam:  General: NAD  Pulmonary: Nonlabored breathing, no respiratory distress  Cardiovascular: NSR  Abdominal: soft, NT/ND, no organomegaly  Extremities: WWP, normal strength, no clubbing/cyanosis/edema, wound in LLE dressing CDI, dressing changed. Wound with pink granulation tissue, no drainage, no erythema.   Neuro: A/O x3, no focal deficits, normal sensation      Lines/drains/tubes:    I&O's Summary    27 Jul 2020 07:01  -  28 Jul 2020 07:00  --------------------------------------------------------  IN: 250 mL / OUT: 950 mL / NET: -700 mL        LABS:                        7.8    11.23 )-----------( 385      ( 27 Jul 2020 07:21 )             26.3     07-27    134<L>  |  99  |  15  ----------------------------<  239<H>  4.5   |  26  |  0.87    Ca    8.1<L>      27 Jul 2020 06:11          CAPILLARY BLOOD GLUCOSE      POCT Blood Glucose.: 164 mg/dL (28 Jul 2020 07:14)  POCT Blood Glucose.: 136 mg/dL (27 Jul 2020 21:50)  POCT Blood Glucose.: 158 mg/dL (27 Jul 2020 17:05)  POCT Blood Glucose.: 149 mg/dL (27 Jul 2020 14:25)        RADIOLOGY & ADDITIONAL STUDIES:

## 2020-07-28 NOTE — PROGRESS NOTE ADULT - ATTENDING COMMENTS
Patient seen and examined with house-staff during bedside rounds.  Resident note read, including vitals, physical findings, laboratory data, and radiological reports.   Revisions included below.  Direct personal management at bed side and extensive interpretation of the data.  Plan was outlined and discussed in details with the housestaff.  Decision making of high complexity  Action taken for acute disease activity to reflect the level of care provided:  - medication reconciliation  - review laboratory data  had a long discussion with the patient about flap on the left wound and planning for the reop on the right knee

## 2020-07-28 NOTE — PROGRESS NOTE ADULT - SUBJECTIVE AND OBJECTIVE BOX
Orthopaedic Surgery Progress Note    Post-operative day #11 s/p R knee arthroscopic I&D, POD #6 s/p R knee explant/spacer    Subjective:     Patient seen and examined. Patient comfortable without complaints, pain controlled.  Denies chest pain, shortness of breath, nausea/vomiting, numbness/tingling.    Objective:    Vital Signs Last 24 Hrs  T(C): 36.7 (07-28-20 @ 08:26), Max: 36.8 (07-28-20 @ 06:00)  T(F): 98.1 (07-28-20 @ 08:26), Max: 98.3 (07-28-20 @ 06:00)  HR: 77 (07-28-20 @ 08:26) (77 - 82)  BP: 104/60 (07-28-20 @ 08:26) (104/60 - 111/62)  RR: 19 (07-28-20 @ 08:26) (19 - 20)  SpO2: 98% (07-28-20 @ 08:26) (98% - 98%)  AVSS    PE:  General: Patient alert and oriented, NAD  Dressing: Clean/dry/intact  Pulses: 2+ DP BLE   Sensation: SILT BLE   Motor: EHL/FHL/TA/GS 5/5 BLE                          7.8    11.23 )-----------( 385      ( 27 Jul 2020 07:21 )             26.3   27 Jul 2020 06:11    134    |  99     |  15     ----------------------------<  239    4.5     |  26     |  0.87           A/P: 65yMale POD #11 s/p R knee arthroscopic I&D, POD #6 s/p R knee explant/spacer  1. Pain control as needed  2. DVT prophylaxis: ASA, Effient, SQH  3. PT, weight-bearing status: WBAT in knee immobilizer  4. Continue nitropaste to L shin BID and silvadene BID to R knee  5. Dispo: PINKY per PT; however, pt prefers to go home when medically stable    Ortho Pager [phone] Orthopaedic Surgery Progress Note    Post-operative day #11 s/p R knee arthroscopic I&D, POD #6 s/p R knee explant/spacer    Subjective:     Patient seen and examined. Patient comfortable without complaints, pain controlled.  Denies chest pain, shortness of breath, nausea/vomiting, numbness/tingling.    Objective:    Vital Signs Last 24 Hrs  T(C): 36.7 (07-28-20 @ 08:26), Max: 36.8 (07-28-20 @ 06:00)  T(F): 98.1 (07-28-20 @ 08:26), Max: 98.3 (07-28-20 @ 06:00)  HR: 77 (07-28-20 @ 08:26) (77 - 82)  BP: 104/60 (07-28-20 @ 08:26) (104/60 - 111/62)  RR: 19 (07-28-20 @ 08:26) (19 - 20)  SpO2: 98% (07-28-20 @ 08:26) (98% - 98%)  AVSS    PE:  General: Patient alert and oriented, NAD  Dressing: Clean/dry/intact  Pulses: 2+ DP BLE   Sensation: SILT BLE   Motor: EHL/FHL/TA/GS 5/5 BLE                          7.8    11.23 )-----------( 385      ( 27 Jul 2020 07:21 )             26.3   27 Jul 2020 06:11    134    |  99     |  15     ----------------------------<  239    4.5     |  26     |  0.87           A/P: 65yMale POD #11 s/p R knee arthroscopic I&D, POD #6 s/p R knee explant/spacer  1. Pain control as needed  2. DVT prophylaxis: ASA, Effient, SQH  3. PT, weight-bearing status: WBAT in knee immobilizer  4. Continue nitropaste to L shin BID and silvadene BID to R knee  5. Continue IV antibiotics. PICC placed yesterday. Appreciate ID recs.  6. Dispo: PINKY per PT; however, pt prefers to go home when medically stable    Ortho Pager [phone] Orthopaedic Surgery Progress Note    Post-operative day #11 s/p R knee arthroscopic I&D, POD #6 s/p R knee explant/spacer    Subjective:     Patient seen and examined. Patient comfortable without complaints, pain controlled.  Denies chest pain, shortness of breath, nausea/vomiting, numbness/tingling.    Objective:    Vital Signs Last 24 Hrs  T(C): 36.7 (07-28-20 @ 08:26), Max: 36.8 (07-28-20 @ 06:00)  T(F): 98.1 (07-28-20 @ 08:26), Max: 98.3 (07-28-20 @ 06:00)  HR: 77 (07-28-20 @ 08:26) (77 - 82)  BP: 104/60 (07-28-20 @ 08:26) (104/60 - 111/62)  RR: 19 (07-28-20 @ 08:26) (19 - 20)  SpO2: 98% (07-28-20 @ 08:26) (98% - 98%)  AVSS    PE:  General: Patient alert and oriented, NAD  Dressing: Clean/dry/intact  Pulses: 2+ DP BLE   Sensation: SILT BLE   Motor: EHL/FHL/TA/GS 5/5 BLE                          7.8    11.23 )-----------( 385      ( 27 Jul 2020 07:21 )             26.3   27 Jul 2020 06:11    134    |  99     |  15     ----------------------------<  239    4.5     |  26     |  0.87           A/P: 65yMale POD #11 s/p R knee arthroscopic I&D, POD #6 s/p R knee explant/spacer  1. Pain control as needed  2. DVT prophylaxis: ASA, Effient, SQH  3. PT, weight-bearing status: TTWB in knee immobilizer  4. Continue nitropaste to L shin BID and silvadene BID to R knee  5. Continue IV antibiotics. PICC placed yesterday. Appreciate ID recs.  6. Dispo: PINKY per PT; however, pt prefers to go home when medically stable    Ortho Pager [phone]

## 2020-07-29 ENCOUNTER — TRANSCRIPTION ENCOUNTER (OUTPATIENT)
Age: 65
End: 2020-07-29

## 2020-07-29 LAB
ANION GAP SERPL CALC-SCNC: 8 MMOL/L — SIGNIFICANT CHANGE UP (ref 5–17)
BUN SERPL-MCNC: 14 MG/DL — SIGNIFICANT CHANGE UP (ref 7–23)
CALCIUM SERPL-MCNC: 8.4 MG/DL — SIGNIFICANT CHANGE UP (ref 8.4–10.5)
CHLORIDE SERPL-SCNC: 98 MMOL/L — SIGNIFICANT CHANGE UP (ref 96–108)
CO2 SERPL-SCNC: 26 MMOL/L — SIGNIFICANT CHANGE UP (ref 22–31)
CREAT SERPL-MCNC: 0.86 MG/DL — SIGNIFICANT CHANGE UP (ref 0.5–1.3)
GLUCOSE BLDC GLUCOMTR-MCNC: 142 MG/DL — HIGH (ref 70–99)
GLUCOSE BLDC GLUCOMTR-MCNC: 173 MG/DL — HIGH (ref 70–99)
GLUCOSE BLDC GLUCOMTR-MCNC: 242 MG/DL — HIGH (ref 70–99)
GLUCOSE BLDC GLUCOMTR-MCNC: 275 MG/DL — HIGH (ref 70–99)
GLUCOSE SERPL-MCNC: 245 MG/DL — HIGH (ref 70–99)
HCT VFR BLD CALC: 25.4 % — LOW (ref 39–50)
HGB BLD-MCNC: 7.6 G/DL — LOW (ref 13–17)
MCHC RBC-ENTMCNC: 24.1 PG — LOW (ref 27–34)
MCHC RBC-ENTMCNC: 29.9 GM/DL — LOW (ref 32–36)
MCV RBC AUTO: 80.4 FL — SIGNIFICANT CHANGE UP (ref 80–100)
NRBC # BLD: 0 /100 WBCS — SIGNIFICANT CHANGE UP (ref 0–0)
PLATELET # BLD AUTO: 491 K/UL — HIGH (ref 150–400)
POTASSIUM SERPL-MCNC: 4.4 MMOL/L — SIGNIFICANT CHANGE UP (ref 3.5–5.3)
POTASSIUM SERPL-SCNC: 4.4 MMOL/L — SIGNIFICANT CHANGE UP (ref 3.5–5.3)
RBC # BLD: 3.16 M/UL — LOW (ref 4.2–5.8)
RBC # FLD: 18.8 % — HIGH (ref 10.3–14.5)
SODIUM SERPL-SCNC: 132 MMOL/L — LOW (ref 135–145)
WBC # BLD: 8.35 K/UL — SIGNIFICANT CHANGE UP (ref 3.8–10.5)
WBC # FLD AUTO: 8.35 K/UL — SIGNIFICANT CHANGE UP (ref 3.8–10.5)

## 2020-07-29 PROCEDURE — 99232 SBSQ HOSP IP/OBS MODERATE 35: CPT | Mod: GC

## 2020-07-29 PROCEDURE — 99233 SBSQ HOSP IP/OBS HIGH 50: CPT

## 2020-07-29 RX ORDER — HEPARIN SODIUM 5000 [USP'U]/ML
5000 INJECTION INTRAVENOUS; SUBCUTANEOUS EVERY 8 HOURS
Refills: 0 | Status: DISCONTINUED | OUTPATIENT
Start: 2020-07-29 | End: 2020-07-30

## 2020-07-29 RX ORDER — SODIUM CHLORIDE 9 MG/ML
1000 INJECTION, SOLUTION INTRAVENOUS
Refills: 0 | Status: DISCONTINUED | OUTPATIENT
Start: 2020-07-29 | End: 2020-08-02

## 2020-07-29 RX ADMIN — Medication 975 MILLIGRAM(S): at 13:35

## 2020-07-29 RX ADMIN — OXYCODONE HYDROCHLORIDE 10 MILLIGRAM(S): 5 TABLET ORAL at 17:45

## 2020-07-29 RX ADMIN — OXYCODONE HYDROCHLORIDE 10 MILLIGRAM(S): 5 TABLET ORAL at 20:52

## 2020-07-29 RX ADMIN — Medication 975 MILLIGRAM(S): at 22:00

## 2020-07-29 RX ADMIN — TAMSULOSIN HYDROCHLORIDE 0.4 MILLIGRAM(S): 0.4 CAPSULE ORAL at 21:39

## 2020-07-29 RX ADMIN — Medication 8 UNIT(S): at 17:46

## 2020-07-29 RX ADMIN — Medication 975 MILLIGRAM(S): at 07:00

## 2020-07-29 RX ADMIN — OXYCODONE HYDROCHLORIDE 10 MILLIGRAM(S): 5 TABLET ORAL at 22:00

## 2020-07-29 RX ADMIN — OXYCODONE HYDROCHLORIDE 10 MILLIGRAM(S): 5 TABLET ORAL at 02:55

## 2020-07-29 RX ADMIN — Medication 81 MILLIGRAM(S): at 07:21

## 2020-07-29 RX ADMIN — Medication 250 MILLIGRAM(S): at 13:35

## 2020-07-29 RX ADMIN — Medication 1 INCH(S): at 11:31

## 2020-07-29 RX ADMIN — Medication 25 MILLIGRAM(S): at 07:19

## 2020-07-29 RX ADMIN — HEPARIN SODIUM 5000 UNIT(S): 5000 INJECTION INTRAVENOUS; SUBCUTANEOUS at 21:40

## 2020-07-29 RX ADMIN — PRASUGREL 10 MILLIGRAM(S): 5 TABLET, FILM COATED ORAL at 12:09

## 2020-07-29 RX ADMIN — POLYETHYLENE GLYCOL 3350 17 GRAM(S): 17 POWDER, FOR SOLUTION ORAL at 12:10

## 2020-07-29 RX ADMIN — Medication 975 MILLIGRAM(S): at 08:20

## 2020-07-29 RX ADMIN — Medication 1 APPLICATION(S): at 21:38

## 2020-07-29 RX ADMIN — PANTOPRAZOLE SODIUM 40 MILLIGRAM(S): 20 TABLET, DELAYED RELEASE ORAL at 07:19

## 2020-07-29 RX ADMIN — CHLORHEXIDINE GLUCONATE 1 APPLICATION(S): 213 SOLUTION TOPICAL at 07:29

## 2020-07-29 RX ADMIN — DULOXETINE HYDROCHLORIDE 90 MILLIGRAM(S): 30 CAPSULE, DELAYED RELEASE ORAL at 12:09

## 2020-07-29 RX ADMIN — Medication 6: at 07:38

## 2020-07-29 RX ADMIN — Medication 250 MILLIGRAM(S): at 02:15

## 2020-07-29 RX ADMIN — Medication 1 APPLICATION(S): at 10:56

## 2020-07-29 RX ADMIN — MORPHINE SULFATE 4 MILLIGRAM(S): 50 CAPSULE, EXTENDED RELEASE ORAL at 07:23

## 2020-07-29 RX ADMIN — Medication 1 INCH(S): at 21:37

## 2020-07-29 RX ADMIN — Medication 1 INCH(S): at 12:09

## 2020-07-29 RX ADMIN — INSULIN GLARGINE 16 UNIT(S): 100 INJECTION, SOLUTION SUBCUTANEOUS at 21:50

## 2020-07-29 RX ADMIN — Medication 975 MILLIGRAM(S): at 14:50

## 2020-07-29 RX ADMIN — Medication 975 MILLIGRAM(S): at 21:40

## 2020-07-29 RX ADMIN — SPIRONOLACTONE 25 MILLIGRAM(S): 25 TABLET, FILM COATED ORAL at 07:22

## 2020-07-29 RX ADMIN — HEPARIN SODIUM 5000 UNIT(S): 5000 INJECTION INTRAVENOUS; SUBCUTANEOUS at 13:32

## 2020-07-29 RX ADMIN — Medication 4: at 17:45

## 2020-07-29 RX ADMIN — ATORVASTATIN CALCIUM 40 MILLIGRAM(S): 80 TABLET, FILM COATED ORAL at 21:40

## 2020-07-29 RX ADMIN — OXYCODONE HYDROCHLORIDE 10 MILLIGRAM(S): 5 TABLET ORAL at 08:20

## 2020-07-29 RX ADMIN — Medication 0.12 MILLIGRAM(S): at 07:22

## 2020-07-29 RX ADMIN — Medication 81 MILLIGRAM(S): at 17:45

## 2020-07-29 RX ADMIN — OXYCODONE HYDROCHLORIDE 10 MILLIGRAM(S): 5 TABLET ORAL at 07:20

## 2020-07-29 RX ADMIN — HEPARIN SODIUM 5000 UNIT(S): 5000 INJECTION INTRAVENOUS; SUBCUTANEOUS at 07:19

## 2020-07-29 RX ADMIN — Medication 2: at 21:50

## 2020-07-29 NOTE — PROGRESS NOTE ADULT - SUBJECTIVE AND OBJECTIVE BOX
Orthopaedic Surgery Progress Note    Post-operative day #12 s/p right knee arthroscopic I+D; POD7 right knee explant/spacer     Subjective:     Patient seen and examined. Patient comfortable without complaints, pain controlled.      Objective:    Vital Signs Last 24 Hrs  T(C): 36.7 (07-29-20 @ 09:20), Max: 36.7 (07-29-20 @ 09:20)  T(F): 98.1 (07-29-20 @ 09:20), Max: 98.1 (07-29-20 @ 09:20)  HR: 86 (07-29-20 @ 09:20) (86 - 86)  BP: 108/59 (07-29-20 @ 09:20) (108/59 - 108/59)  BP(mean): --  RR: 16 (07-29-20 @ 09:20) (16 - 16)  SpO2: 98% (07-29-20 @ 09:20) (98% - 98%)  AVSS    PE:  General: Patient alert and oriented, NAD  Dressing: Clean/dry/intact right knee; left lower leg  Skin well perfused  Firing TA/GS                            7.6    8.35  )-----------( 491      ( 29 Jul 2020 07:13 )             25.4   29 Jul 2020 07:12    132    |  98     |  14     ----------------------------<  245    4.4     |  26     |  0.86     Ca    8.4        29 Jul 2020 07:12        A/P: 65yMale POD#12 s/p right knee arthroscopic I+D; POD7 right knee explant/spacer   1. Pain control as needed  2. DVT prophylaxis: ASA, Effient, SQH  3. PT, weight-bearing status:  TTWB in KI; PT recommends PINKY however patient prefers to go home when medically stable   4. continue nitropaste to left shin BID and silvadene BID to right knee   5. PICC placed on 7/27; continue antibiotics. Appreciate ID recommendations   6. per Dr. Castro, plan for patient to go back to OR tomorrow with plastic surgery; discussed with Dr. Caldera - will continue ASA prior to OR, ok to hold effient if needed; will hold heparin AM of surgery   7. Appreciate medicine recommendations  8. Na 132, consistent with Na level throughout admission, will continue to monitor     Ortho Pager [phone] Orthopaedic Surgery Progress Note    Post-operative day #12 s/p right knee arthroscopic I+D; POD7 right knee explant/spacer     Subjective:     Patient seen and examined. Patient comfortable without complaints, pain controlled.      Objective:    Vital Signs Last 24 Hrs  T(C): 36.7 (07-29-20 @ 09:20), Max: 36.7 (07-29-20 @ 09:20)  T(F): 98.1 (07-29-20 @ 09:20), Max: 98.1 (07-29-20 @ 09:20)  HR: 86 (07-29-20 @ 09:20) (86 - 86)  BP: 108/59 (07-29-20 @ 09:20) (108/59 - 108/59)  BP(mean): --  RR: 16 (07-29-20 @ 09:20) (16 - 16)  SpO2: 98% (07-29-20 @ 09:20) (98% - 98%)  AVSS    PE:  General: Patient alert and oriented, NAD  Dressing: Clean/dry/intact right knee; left lower leg  Skin well perfused  Firing TA/GS                            7.6    8.35  )-----------( 491      ( 29 Jul 2020 07:13 )             25.4   29 Jul 2020 07:12    132    |  98     |  14     ----------------------------<  245    4.4     |  26     |  0.86     Ca    8.4        29 Jul 2020 07:12        A/P: 65yMale POD#12 s/p right knee arthroscopic I+D; POD7 right knee explant/spacer   1. Pain control as needed  2. DVT prophylaxis: ASA, Effient, SQH  3. PT, weight-bearing status:  TTWB in KI; PT recommends PINKY however patient prefers to go home when medically stable   4. continue nitropaste to left shin BID and silvadene BID to right knee   5. PICC placed on 7/27; continue antibiotics. Appreciate ID recommendations   6. per Dr. Castro, plan for patient to go back to OR tomorrow with plastic surgery; discussed with Dr. Caldera - will continue ASA prior to OR, ok to hold effient if needed; will hold heparin AM of surgery   7. Appreciate medicine recommendations  8. Na 132, consistent with Na level throughout admission, will continue to monitor   9. hgb 7.6/25.4 this AM; discussed with Dr. Caldera, per Dr. Alvarado no need for preop transfusion prior to plastic surgery procedure; per Dr. Werner sierra hgb>7    Ortho Pager [phone]

## 2020-07-29 NOTE — PROGRESS NOTE ADULT - SUBJECTIVE AND OBJECTIVE BOX
INTERVAL HISTORY:  c/o knee pain	  Chart, [facility] medications and allergies reviewed    MEDICATIONS:  MEDICATIONS  (STANDING):  acetaminophen   Tablet .. 975 milliGRAM(s) Oral every 8 hours  aspirin enteric coated 81 milliGRAM(s) Oral two times a day  atorvastatin 40 milliGRAM(s) Oral at bedtime  chlorhexidine 2% Cloths 1 Application(s) Topical <User Schedule>  dextrose 5%. 1000 milliLiter(s) (50 mL/Hr) IV Continuous <Continuous>  dextrose 50% Injectable 12.5 Gram(s) IV Push once  dextrose 50% Injectable 25 Gram(s) IV Push once  dextrose 50% Injectable 25 Gram(s) IV Push once  digoxin     Tablet 0.125 milliGRAM(s) Oral daily  DULoxetine 90 milliGRAM(s) Oral daily  heparin   Injectable 5000 Unit(s) SubCutaneous every 8 hours  insulin glargine Injectable (LANTUS) 16 Unit(s) SubCutaneous at bedtime  insulin lispro (HumaLOG) corrective regimen sliding scale   SubCutaneous Before meals and at bedtime  insulin lispro Injectable (HumaLOG) 8 Unit(s) SubCutaneous three times a day before meals  lactated ringers. 1000 milliLiter(s) (40 mL/Hr) IV Continuous <Continuous>  metoprolol succinate ER 25 milliGRAM(s) Oral daily  nitroglycerin    2% Ointment 1 Inch(s) Transdermal two times a day  oxyCODONE  ER Tablet 10 milliGRAM(s) Oral every 12 hours  pantoprazole    Tablet 40 milliGRAM(s) Oral before breakfast  polyethylene glycol 3350 17 Gram(s) Oral daily  povidone iodine 5% Nasal Swab 1 Application(s) Both Nostrils once  prasugrel 10 milliGRAM(s) Oral daily  rifAMPin 300 milliGRAM(s) Oral every 12 hours  silver sulfADIAZINE 1% Cream 1 Application(s) Topical two times a day  spironolactone 25 milliGRAM(s) Oral daily  tamsulosin 0.4 milliGRAM(s) Oral at bedtime  vancomycin  IVPB 750 milliGRAM(s) IV Intermittent every 12 hours      REVIEW OF SYSTEMS:  CONSTITUTIONAL: No fever, no weight loss, no fatigue  PULMONARY: No cough, no wheezing, chills no hemoptysis; No Shortness of Breath  CARDIOVASCULAR: No chest pain, no palpitations, no syncope, dizziness, no leg swelling  GASTROINTESTINAL: No abdominal pain. No nausea, no  vomiting, no hematemesis; No diarrhea, no constipation. No melena, no hematochezia.  GENITOURINARY: No dysuria, no frequency, no hematuria, no incontinence  SKIN: No itching, no burning, no rashes, no lesions     PHYSICAL EXAM:  T(C): 36.8 (07-29-20 @ 21:07), Max: 36.9 (07-29-20 @ 13:31)  HR: 81 (07-29-20 @ 21:07) (70 - 86)  BP: 116/67 (07-29-20 @ 21:07) (100/45 - 116/67)  RR: 17 (07-29-20 @ 21:07) (16 - 18)  SpO2: 95% (07-29-20 @ 21:07) (95% - 98%)  Wt(kg): --  I&O's Summary    28 Jul 2020 07:01  -  29 Jul 2020 07:00  --------------------------------------------------------  IN: 400 mL / OUT: 1150 mL / NET: -750 mL    29 Jul 2020 07:01  -  29 Jul 2020 21:36  --------------------------------------------------------  IN: 1450 mL / OUT: 1400 mL / NET: 50 mL        Appearance:  No deformities, normal appearance	  HEENT:   Normal oral mucosa, PERRL, EOMI	  Neck: No jvd , no masses  Lymphatic: No  lymphadenopathy  Pulmonary: Lungs clear to auscultation and percussion  Cardiovascular: Normal S1 S2, No JVD, No murmur, No edema	  Abdomen:  Soft, Non-tender, + BS  Skin: No rashes, No ecchymoses	  Extremities:  No clubbing or cyanosis  MSK: Normal range of motion, no joint swelling    LABS:		  CARDIAC MARKERS:                         7.6    8.35  )-----------( 491      ( 29 Jul 2020 07:13 )             25.4   07-29    132<L>  |  98  |  14  ----------------------------<  245<H>  4.4   |  26  |  0.86    Ca    8.4      29 Jul 2020 07:12      proBNP:  Lipid Profile:  HgA1c:  TSH:      TELEMETRY: off	      QTC     ECG:  	   QTC         RADIOLOGY:   DIAGNOSTIC TESTING: [ ] Echocardiogram: [ ]  Catheterization: [ ] Stress Test:      ASSESSMENT/PLAN: 	  MRSA sepsis- patient is at risk for infected leads. JOHN PAUL is negative. Defibrillator pocket looks good.  Repeat blood cultures are negative. Normal WBC.    Severely reduced ejection fraction-the patient takes diuretics occasionally for weight gain. He is sedentary however denies dyspnea. His pulmonary pressures are high on echo. BNP is elevated but chest x-ray is clear. Rx;d with  metoprolol 25mg daily and digoxin. Started on spironolactone 25 daily. Monitor closely with IV fluids.    Coronary artery disease-the patient denies chest discomfort. His EKG is uninterpretable secondary to the pacemaker. He was revascularized less than two years ago. There is no clinical evidence for ischemia. Rx;d with aspirin and atorvastatin.  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     Defibrillator-off monitor, continue metoprolol.    Anemia- transfuse over 7 if cardiac status remains stable.      Discussed with house staff.

## 2020-07-29 NOTE — PROGRESS NOTE ADULT - SUBJECTIVE AND OBJECTIVE BOX
Ortho Progress Note    Pt's pain controlled.  No acute events overnight, contuing to wait for skin demarcation.  Denies CP, SOB, N/V, numbness/tingling.    Vital Signs Last 24 Hrs  T(C): 36.7 (29 Jul 2020 04:20), Max: 36.7 (28 Jul 2020 08:26)  T(F): 98.1 (29 Jul 2020 04:20), Max: 98.1 (28 Jul 2020 08:26)  HR: 86 (29 Jul 2020 04:20) (77 - 86)  BP: 108/59 (29 Jul 2020 04:20) (101/60 - 109/64)  BP(mean): --  RR: 18 (29 Jul 2020 04:20) (18 - 20)  SpO2: 98% (29 Jul 2020 04:20) (97% - 99%)    Physical Exam:  General: Resting comfortably in bed. AAOx3. NAD.  Extremities:        LLE: Dressing in place LLE, diabetic ulcer being managed by vascular team. Nitropaste twice daily SILT L2-S1 distribution, symmetric. TA/EHL/FHL/GS motor intact. Feet cool b/l       RLE: Gauze/Silvedine dressing C/D/I. SILT L2-S1 distribution, symmetric. TA/EHL/FHL/GS motor intact. Feet cool b/l, cap refill <3 sec                          7.8    11.23 )-----------( 385      ( 27 Jul 2020 07:21 )             26.3         A/P: 65yMale s/p R total knee explant, antibiotic spacer 7/22  - Stable  - Pain Control  - DVT ppx: ASA/Effient  - Appreciate ID recs: on Vanco, rifampin for 6 weeks from date of last procedure  - Appreciate ID recs -- vanc + rifampin  - Continue to trend WBC/ESR/CRP  - Nitroglycerin paste to left tibia for nonhealing diabetic ulcer wound  - Silvedene for RLE wound twice daily  - Waiting for clear demarcation of R knee wound before attempting repeat I&D  - F/u blood cultures  - PT, WBS: TTWB RLE    Ortho Pager [phone]

## 2020-07-29 NOTE — PROGRESS NOTE ADULT - SUBJECTIVE AND OBJECTIVE BOX
Interval Events: Reviewed  Patient seen and examined at bedside.    Patient is a 65y old  Male who presents with a chief complaint of septic knee (28 Jul 2020 11:21)  he is doing OK    PAST MEDICAL & SURGICAL HISTORY:  Diabetic neuropathy  STEMI (ST elevation myocardial infarction)  Diverticulitis  MRSA bacteremia  History of celiac disease  CHF (congestive heart failure): EF ~ 25%  HTN (hypertension)  Diabetes: on insulin pump  Blood clot due to device, implant, or graft: was on blood thinners  HLD (hyperlipidemia)  Osteoarthritis  Atherosclerosis of coronary artery: CAD (coronary artery disease)  Status post percutaneous transluminal coronary angioplasty: in 2012  History of open reduction and internal fixation (ORIF) procedure  Surgery, elective: Right shoulder  Surgery, elective: right knee wound debridement  S/P TKR (total knee replacement), right: with infection Mrsa   per pt he was cleared from MRSA infection  S/P CABG x 1: 2018  Stented coronary artery: 10/18 heart attack  INFERIOR WALL MI  Other postprocedural status: Fixation hardware in foot LEFT      MEDICATIONS:  Pulmonary:    Antimicrobials:  rifAMPin 300 milliGRAM(s) Oral every 12 hours  vancomycin  IVPB 750 milliGRAM(s) IV Intermittent every 12 hours    Anticoagulants:  aspirin enteric coated 81 milliGRAM(s) Oral two times a day  heparin   Injectable 5000 Unit(s) SubCutaneous every 8 hours  prasugrel 10 milliGRAM(s) Oral daily    Cardiac:  digoxin     Tablet 0.125 milliGRAM(s) Oral daily  metoprolol succinate ER 25 milliGRAM(s) Oral daily  nitroglycerin    2% Ointment 1 Inch(s) Transdermal two times a day  spironolactone 25 milliGRAM(s) Oral daily  tamsulosin 0.4 milliGRAM(s) Oral at bedtime      Allergies    ACE inhibitors (Hives)  carvedilol (Other)  enalapril (Hives)  Entresto (Other)    Intolerances        Vital Signs Last 24 Hrs  T(C): 36.8 (29 Jul 2020 21:07), Max: 36.9 (29 Jul 2020 13:31)  T(F): 98.3 (29 Jul 2020 21:07), Max: 98.4 (29 Jul 2020 13:31)  HR: 81 (29 Jul 2020 21:07) (70 - 86)  BP: 116/67 (29 Jul 2020 21:07) (100/45 - 116/67)  BP(mean): --  RR: 17 (29 Jul 2020 21:07) (16 - 18)  SpO2: 95% (29 Jul 2020 21:07) (95% - 98%)    07-28 @ 07:01 - 07-29 @ 07:00  --------------------------------------------------------  IN: 400 mL / OUT: 1150 mL / NET: -750 mL    07-29 @ 07:01 - 07-29 @ 21:24  --------------------------------------------------------  IN: 1450 mL / OUT: 1400 mL / NET: 50 mL          Review of Systems:   •	General: negative  •	Skin/Breast: negative  •	Ophthalmologic: negative  •	ENMT: negative  •	Respiratory and Thorax: negative  •	Cardiovascular: negative  •	Gastrointestinal: negative  •	Genitourinary: negative  •	Musculoskeletal: negative  •	Neurological: negative  •	Psychiatric: negative  •	Hematology/Lymphatics: negative  •	Endocrine: negative  •	Allergic/Immunologic: negative    Physical Exam:   • Constitutional:	Well-developed, well nourished  • Eyes:	EOMI; PERRL; no drainage or redness  • ENMT:	No oral lesions; no gross abnormalities  • Neck	No bruits; no thyromegaly or nodules  • Breasts:	not examined  • Back:	No deformity or limitation of movement  • Respiratory:	Breath Sounds equal & clear to percussion & auscultation, no accessory muscle use  • Cardiovascular:	Regular rate & rhythm, normal S1, S2; no murmurs, gallops or rubs; no S3, S4  • Gastrointestinal:	Soft, non-tender, no hepatosplenomegaly, normal bowel sounds  • Genitourinary:	not examined  • Rectal: not examined  • Extremities:	No cyanosis, clubbing or edema  • Vascular:	Equal and normal pulses (carotid, femoral, dorsalis pedis)  • Neurologica:l	not examined  • Skin:	No lesions; no rash  • Lymph Nodes:	No lymphadedenopathy  • Musculoskeletal:	No joint pain, swelling or deformity; no limitation of movement        LABS:      CBC Full  -  ( 29 Jul 2020 07:13 )  WBC Count : 8.35 K/uL  RBC Count : 3.16 M/uL  Hemoglobin : 7.6 g/dL  Hematocrit : 25.4 %  Platelet Count - Automated : 491 K/uL  Mean Cell Volume : 80.4 fl  Mean Cell Hemoglobin : 24.1 pg  Mean Cell Hemoglobin Concentration : 29.9 gm/dL  Auto Neutrophil # : x  Auto Lymphocyte # : x  Auto Monocyte # : x  Auto Eosinophil # : x  Auto Basophil # : x  Auto Neutrophil % : x  Auto Lymphocyte % : x  Auto Monocyte % : x  Auto Eosinophil % : x  Auto Basophil % : x    07-29    132<L>  |  98  |  14  ----------------------------<  245<H>  4.4   |  26  |  0.86    Ca    8.4      29 Jul 2020 07:12                          RADIOLOGY & ADDITIONAL STUDIES (The following images were personally reviewed):  Loja:                                     No  Urine output:                       adequate  DVT prophylaxis:                 Yes  Flattus:                                  Yes  Bowel movement:              No

## 2020-07-29 NOTE — PROGRESS NOTE ADULT - ATTENDING COMMENTS
Seen by me this afternoon. Pain much better overall, though still with some right shin and posterior ankle pain with weightbearing. Left leg Integra application scheduled for tomorrow morning with Dr. Bowen.    Right knee incision dry, dusky area not enlarged from prior, no active drainage.    XRs from yesterday show no fracture or spacer problem.    Plan as above. We'll be continuing the discussion regarding his discharge disposition; he hates being institutionalized. I encouraged him to continue participating with PT as much as possible to hopefully allow him to return home soon.

## 2020-07-30 LAB
ANION GAP SERPL CALC-SCNC: 11 MMOL/L — SIGNIFICANT CHANGE UP (ref 5–17)
BUN SERPL-MCNC: 15 MG/DL — SIGNIFICANT CHANGE UP (ref 7–23)
CALCIUM SERPL-MCNC: 8 MG/DL — LOW (ref 8.4–10.5)
CHLORIDE SERPL-SCNC: 98 MMOL/L — SIGNIFICANT CHANGE UP (ref 96–108)
CO2 SERPL-SCNC: 24 MMOL/L — SIGNIFICANT CHANGE UP (ref 22–31)
CREAT SERPL-MCNC: 0.79 MG/DL — SIGNIFICANT CHANGE UP (ref 0.5–1.3)
GLUCOSE BLDC GLUCOMTR-MCNC: 139 MG/DL — HIGH (ref 70–99)
GLUCOSE BLDC GLUCOMTR-MCNC: 155 MG/DL — HIGH (ref 70–99)
GLUCOSE BLDC GLUCOMTR-MCNC: 213 MG/DL — HIGH (ref 70–99)
GLUCOSE SERPL-MCNC: 205 MG/DL — HIGH (ref 70–99)
HCT VFR BLD CALC: 25.7 % — LOW (ref 39–50)
HGB BLD-MCNC: 7.9 G/DL — LOW (ref 13–17)
MCHC RBC-ENTMCNC: 23.7 PG — LOW (ref 27–34)
MCHC RBC-ENTMCNC: 30.7 GM/DL — LOW (ref 32–36)
MCV RBC AUTO: 76.9 FL — LOW (ref 80–100)
NRBC # BLD: 0 /100 WBCS — SIGNIFICANT CHANGE UP (ref 0–0)
PLATELET # BLD AUTO: 457 K/UL — HIGH (ref 150–400)
POTASSIUM SERPL-MCNC: 4.2 MMOL/L — SIGNIFICANT CHANGE UP (ref 3.5–5.3)
POTASSIUM SERPL-SCNC: 4.2 MMOL/L — SIGNIFICANT CHANGE UP (ref 3.5–5.3)
RBC # BLD: 3.34 M/UL — LOW (ref 4.2–5.8)
RBC # FLD: 18.8 % — HIGH (ref 10.3–14.5)
SARS-COV-2 RNA SPEC QL NAA+PROBE: SIGNIFICANT CHANGE UP
SODIUM SERPL-SCNC: 133 MMOL/L — LOW (ref 135–145)
VANCOMYCIN TROUGH SERPL-MCNC: 16 UG/ML — SIGNIFICANT CHANGE UP (ref 10–20)
WBC # BLD: 9.48 K/UL — SIGNIFICANT CHANGE UP (ref 3.8–10.5)
WBC # FLD AUTO: 9.48 K/UL — SIGNIFICANT CHANGE UP (ref 3.8–10.5)

## 2020-07-30 PROCEDURE — 99232 SBSQ HOSP IP/OBS MODERATE 35: CPT | Mod: GC

## 2020-07-30 RX ORDER — ASPIRIN/CALCIUM CARB/MAGNESIUM 324 MG
81 TABLET ORAL
Refills: 0 | Status: DISCONTINUED | OUTPATIENT
Start: 2020-07-30 | End: 2020-08-10

## 2020-07-30 RX ORDER — HEPARIN SODIUM 5000 [USP'U]/ML
5000 INJECTION INTRAVENOUS; SUBCUTANEOUS EVERY 8 HOURS
Refills: 0 | Status: DISCONTINUED | OUTPATIENT
Start: 2020-07-30 | End: 2020-08-10

## 2020-07-30 RX ORDER — SODIUM CHLORIDE 9 MG/ML
250 INJECTION INTRAMUSCULAR; INTRAVENOUS; SUBCUTANEOUS ONCE
Refills: 0 | Status: COMPLETED | OUTPATIENT
Start: 2020-07-30 | End: 2020-07-30

## 2020-07-30 RX ADMIN — Medication 81 MILLIGRAM(S): at 17:07

## 2020-07-30 RX ADMIN — Medication 250 MILLIGRAM(S): at 02:17

## 2020-07-30 RX ADMIN — SODIUM CHLORIDE 1500 MILLILITER(S): 9 INJECTION INTRAMUSCULAR; INTRAVENOUS; SUBCUTANEOUS at 17:38

## 2020-07-30 RX ADMIN — Medication 2: at 23:12

## 2020-07-30 RX ADMIN — Medication 975 MILLIGRAM(S): at 22:00

## 2020-07-30 RX ADMIN — OXYCODONE HYDROCHLORIDE 10 MILLIGRAM(S): 5 TABLET ORAL at 05:31

## 2020-07-30 RX ADMIN — Medication 1 INCH(S): at 11:45

## 2020-07-30 RX ADMIN — Medication 2: at 16:49

## 2020-07-30 RX ADMIN — CHLORHEXIDINE GLUCONATE 1 APPLICATION(S): 213 SOLUTION TOPICAL at 05:01

## 2020-07-30 RX ADMIN — Medication 975 MILLIGRAM(S): at 14:06

## 2020-07-30 RX ADMIN — ATORVASTATIN CALCIUM 40 MILLIGRAM(S): 80 TABLET, FILM COATED ORAL at 21:34

## 2020-07-30 RX ADMIN — Medication 975 MILLIGRAM(S): at 21:34

## 2020-07-30 RX ADMIN — OXYCODONE HYDROCHLORIDE 10 MILLIGRAM(S): 5 TABLET ORAL at 23:18

## 2020-07-30 RX ADMIN — Medication 1 APPLICATION(S): at 10:40

## 2020-07-30 RX ADMIN — Medication 250 MILLIGRAM(S): at 14:40

## 2020-07-30 RX ADMIN — Medication 1 INCH(S): at 21:35

## 2020-07-30 RX ADMIN — Medication 0.12 MILLIGRAM(S): at 05:00

## 2020-07-30 RX ADMIN — Medication 1 INCH(S): at 10:40

## 2020-07-30 RX ADMIN — Medication 1 APPLICATION(S): at 07:00

## 2020-07-30 RX ADMIN — Medication 25 MILLIGRAM(S): at 05:00

## 2020-07-30 RX ADMIN — OXYCODONE HYDROCHLORIDE 10 MILLIGRAM(S): 5 TABLET ORAL at 05:00

## 2020-07-30 RX ADMIN — Medication 975 MILLIGRAM(S): at 16:34

## 2020-07-30 RX ADMIN — INSULIN GLARGINE 16 UNIT(S): 100 INJECTION, SOLUTION SUBCUTANEOUS at 23:11

## 2020-07-30 RX ADMIN — TAMSULOSIN HYDROCHLORIDE 0.4 MILLIGRAM(S): 0.4 CAPSULE ORAL at 21:34

## 2020-07-30 RX ADMIN — HEPARIN SODIUM 5000 UNIT(S): 5000 INJECTION INTRAVENOUS; SUBCUTANEOUS at 23:15

## 2020-07-30 RX ADMIN — Medication 4: at 06:45

## 2020-07-30 RX ADMIN — Medication 1 INCH(S): at 00:02

## 2020-07-30 RX ADMIN — Medication 975 MILLIGRAM(S): at 05:31

## 2020-07-30 RX ADMIN — SPIRONOLACTONE 25 MILLIGRAM(S): 25 TABLET, FILM COATED ORAL at 05:00

## 2020-07-30 RX ADMIN — Medication 975 MILLIGRAM(S): at 05:00

## 2020-07-30 RX ADMIN — Medication 1 APPLICATION(S): at 21:35

## 2020-07-30 NOTE — PROGRESS NOTE ADULT - SUBJECTIVE AND OBJECTIVE BOX
Ortho Progress Note    Pt's pain controlled.  No acute events overnight, contuing to wait for skin demarcation. NPO for OR today with plastic surgery for integra application onto LLE.  Denies CP, SOB, N/V, numbness/tingling.    Vital Signs Last 24 Hrs  T(C): 36.7 (30 Jul 2020 05:27), Max: 36.9 (29 Jul 2020 13:31)  T(F): 98.1 (30 Jul 2020 05:27), Max: 98.4 (29 Jul 2020 13:31)  HR: 92 (30 Jul 2020 05:27) (70 - 92)  BP: 125/74 (30 Jul 2020 05:27) (100/45 - 125/74)  BP(mean): --  RR: 18 (30 Jul 2020 05:27) (16 - 18)  SpO2: 94% (30 Jul 2020 05:27) (94% - 98%)    Physical Exam:  General: Resting comfortably in bed. AAOx3. NAD.  Extremities:        LLE: Dressing in place LLE, diabetic ulcer being managed by vascular team. Nitropaste twice daily SILT L2-S1 distribution, symmetric. TA/EHL/FHL/GS motor intact. Feet cool b/l       RLE: Gauze/Silvedine dressing C/D/I. SILT L2-S1 distribution, symmetric. TA/EHL/FHL/GS motor intact. Feet cool b/l, cap refill <3 sec                            7.9    9.48  )-----------( 457      ( 30 Jul 2020 06:57 )             25.7       A/P: 65yMale s/p R total knee explant, antibiotic spacer 7/22  - Stable  - Pain Control  - DVT ppx: ASA/Effient  - NPO for L lower extremity integra application  - Appreciate ID recs: on Vanco, rifampin for 6 weeks from date of last procedure  - Appreciate ID recs -- vanc + rifampin  - Continue to trend WBC/ESR/CRP  - Nitroglycerin paste to left tibia for nonhealing diabetic ulcer wound  - Silvedene for RLE wound twice daily  - Waiting for clear demarcation of R knee wound before attempting repeat I&D  - F/u blood cultures  - PT, WBS: TTWB RLE    Ortho Pager [phone]

## 2020-07-30 NOTE — PROGRESS NOTE ADULT - SUBJECTIVE AND OBJECTIVE BOX
Interval Events: Reviewed  Patient seen and examined at bedside.    Patient is a 65y old  Male who presents with a chief complaint of septic knee (29 Jul 2020 21:36)    he is doing well  PAST MEDICAL & SURGICAL HISTORY:  Diabetic neuropathy  STEMI (ST elevation myocardial infarction)  Diverticulitis  MRSA bacteremia  History of celiac disease  CHF (congestive heart failure): EF ~ 25%  HTN (hypertension)  Diabetes: on insulin pump  Blood clot due to device, implant, or graft: was on blood thinners  HLD (hyperlipidemia)  Osteoarthritis  Atherosclerosis of coronary artery: CAD (coronary artery disease)  Status post percutaneous transluminal coronary angioplasty: in 2012  History of open reduction and internal fixation (ORIF) procedure  Surgery, elective: Right shoulder  Surgery, elective: right knee wound debridement  S/P TKR (total knee replacement), right: with infection Mrsa   per pt he was cleared from MRSA infection  S/P CABG x 1: 2018  Stented coronary artery: 10/18 heart attack  INFERIOR WALL MI  Other postprocedural status: Fixation hardware in foot LEFT      MEDICATIONS:  Pulmonary:    Antimicrobials:  rifAMPin 300 milliGRAM(s) Oral every 12 hours  vancomycin  IVPB 750 milliGRAM(s) IV Intermittent every 12 hours    Anticoagulants:  aspirin enteric coated 81 milliGRAM(s) Oral two times a day  heparin   Injectable 5000 Unit(s) SubCutaneous every 8 hours  prasugrel 10 milliGRAM(s) Oral daily    Cardiac:  digoxin     Tablet 0.125 milliGRAM(s) Oral daily  metoprolol succinate ER 25 milliGRAM(s) Oral daily  nitroglycerin    2% Ointment 1 Inch(s) Transdermal two times a day  spironolactone 25 milliGRAM(s) Oral daily  tamsulosin 0.4 milliGRAM(s) Oral at bedtime      Allergies    ACE inhibitors (Hives)  carvedilol (Other)  enalapril (Hives)  Entresto (Other)    Intolerances        Vital Signs Last 24 Hrs  T(C): 36.7 (30 Jul 2020 17:30), Max: 36.8 (30 Jul 2020 10:37)  T(F): 98.1 (30 Jul 2020 17:30), Max: 98.3 (30 Jul 2020 10:37)  HR: 73 (30 Jul 2020 17:30) (68 - 92)  BP: 95/58 (30 Jul 2020 18:54) (81/49 - 135/76)  BP(mean): 79 (30 Jul 2020 14:45) (70 - 79)  RR: 16 (30 Jul 2020 17:30) (12 - 20)  SpO2: 100% (30 Jul 2020 17:30) (94% - 100%)    07-29 @ 07:01 - 07-30 @ 07:00  --------------------------------------------------------  IN: 2200 mL / OUT: 2350 mL / NET: -150 mL    07-30 @ 07:01 - 07-30 @ 21:28  --------------------------------------------------------  IN: 0 mL / OUT: 1200 mL / NET: -1200 mL          Review of Systems:   •	General: negative  •	Skin/Breast: negative  •	Ophthalmologic: negative  •	ENMT: negative  •	Respiratory and Thorax: negative  •	Cardiovascular: negative  •	Gastrointestinal: negative  •	Genitourinary: negative  •	Musculoskeletal: negative  •	Neurological: negative  •	Psychiatric: negative  •	Hematology/Lymphatics: negative  •	Endocrine: negative  •	Allergic/Immunologic: negative    Physical Exam:   • Constitutional:	Well-developed, well nourished  • Eyes:	EOMI; PERRL; no drainage or redness  • ENMT:	No oral lesions; no gross abnormalities  • Neck	No bruits; no thyromegaly or nodules  • Breasts:	not examined  • Back:	No deformity or limitation of movement  • Respiratory:	Breath Sounds equal & clear to percussion & auscultation, no accessory muscle use  • Cardiovascular:	Regular rate & rhythm, normal S1, S2; no murmurs, gallops or rubs; no S3, S4  • Gastrointestinal:	Soft, non-tender, no hepatosplenomegaly, normal bowel sounds  • Genitourinary:	not examined  • Rectal: not examined  • Extremities:	No cyanosis, clubbing or edema  • Vascular:	Equal and normal pulses (carotid, femoral, dorsalis pedis)  • Neurologica:l	not examined  • Skin:	No lesions; no rash  • Lymph Nodes:	No lymphadedenopathy  • Musculoskeletal:	No joint pain, swelling or deformity; no limitation of movement        LABS:      CBC Full  -  ( 30 Jul 2020 06:57 )  WBC Count : 9.48 K/uL  RBC Count : 3.34 M/uL  Hemoglobin : 7.9 g/dL  Hematocrit : 25.7 %  Platelet Count - Automated : 457 K/uL  Mean Cell Volume : 76.9 fl  Mean Cell Hemoglobin : 23.7 pg  Mean Cell Hemoglobin Concentration : 30.7 gm/dL  Auto Neutrophil # : x  Auto Lymphocyte # : x  Auto Monocyte # : x  Auto Eosinophil # : x  Auto Basophil # : x  Auto Neutrophil % : x  Auto Lymphocyte % : x  Auto Monocyte % : x  Auto Eosinophil % : x  Auto Basophil % : x    07-30    133<L>  |  98  |  15  ----------------------------<  205<H>  4.2   |  24  |  0.79    Ca    8.0<L>      30 Jul 2020 06:57                          RADIOLOGY & ADDITIONAL STUDIES (The following images were personally reviewed):  Loja:                                     No  Urine output:                       adequate  DVT prophylaxis:                 Yes  Flattus:                                  Yes  Bowel movement:              No

## 2020-07-30 NOTE — PROGRESS NOTE ADULT - SUBJECTIVE AND OBJECTIVE BOX
Ortho Note  *Post-op Check      Pt seen and examined. Sleepy by arousable. Comfortable without complaints, pain controlled  Denies CP, SOB, N/V, numbness/tingling     Vital Signs Last 24 Hrs  T(C): 36.3 (07-30-20 @ 13:00), Max: 36.8 (07-30-20 @ 10:37)  T(F): 97.3 (07-30-20 @ 13:00), Max: 98.3 (07-30-20 @ 10:37)  HR: 71 (07-30-20 @ 14:15) (70 - 85)  BP: 103/58 (07-30-20 @ 14:15) (100/52 - 119/66)  BP(mean): 76 (07-30-20 @ 14:15) (70 - 76)  RR: 20 (07-30-20 @ 14:15) (12 - 20)  SpO2: 100% (07-30-20 @ 14:15) (96% - 100%)  AVSS    focused exam:  General: Pt Alert and oriented, NAD  DSG- 4x4/ kerlix to R knee CDI, wound vac+ ace wrap to LLE CDI  Pulses: feet cool to touch, at baseline; followed by vascular outpatient;   Sensation: SILT BLE, h/o neuropathy  Motor: firing EHL/FHL/TA/GS; motor function intact                          7.9    9.48  )-----------( 457      ( 30 Jul 2020 06:57 )             25.7   30 Jul 2020 06:57    133    |  98     |  15     ----------------------------<  205    4.2     |  24     |  0.79           A/P: 65yMale s/p right knee arthroscopic I+D 7/17, right knee explant and abx spacer 7/22, now with debridement of LLE vascular wound by plastics with wound vac placement  - Labs WNL, VSS; continue to monitor and appreciate medicine co-management recs  - Pain Control  - DVT ppx: effient/ ASA/ HSQ  - PT, WBS: TTWB in KI RLE  - OOB for meals as tolerated; aggressive I/S  - appreciate ID recs- Vanc troph 1am 7/30 (prior to 3rd dose of 750) therapeutic at 16. Follow-up vanc troph 1am tonight prior to 5th dose (4th dose given in OR)  - wound vac management as per plastics- vac changes Monday/Thursday by plastics  - dispo: recommended for [facility]; patient wants to go home. Plan to continue to progress to home with home PT and home infusion services    Ortho Pager [phone]

## 2020-07-31 LAB
ALBUMIN SERPL ELPH-MCNC: 2.1 G/DL — LOW (ref 3.3–5)
ALP SERPL-CCNC: 118 U/L — SIGNIFICANT CHANGE UP (ref 40–120)
ALT FLD-CCNC: 11 U/L — SIGNIFICANT CHANGE UP (ref 10–45)
ANION GAP SERPL CALC-SCNC: 8 MMOL/L — SIGNIFICANT CHANGE UP (ref 5–17)
AST SERPL-CCNC: 15 U/L — SIGNIFICANT CHANGE UP (ref 10–40)
BILIRUB SERPL-MCNC: 0.5 MG/DL — SIGNIFICANT CHANGE UP (ref 0.2–1.2)
BUN SERPL-MCNC: 14 MG/DL — SIGNIFICANT CHANGE UP (ref 7–23)
CALCIUM SERPL-MCNC: 8.3 MG/DL — LOW (ref 8.4–10.5)
CHLORIDE SERPL-SCNC: 100 MMOL/L — SIGNIFICANT CHANGE UP (ref 96–108)
CO2 SERPL-SCNC: 26 MMOL/L — SIGNIFICANT CHANGE UP (ref 22–31)
CREAT SERPL-MCNC: 0.85 MG/DL — SIGNIFICANT CHANGE UP (ref 0.5–1.3)
CRP SERPL-MCNC: <0.03 MG/DL — SIGNIFICANT CHANGE UP (ref 0–0.4)
ERYTHROCYTE [SEDIMENTATION RATE] IN BLOOD: 83 MM/HR — HIGH
GLUCOSE BLDC GLUCOMTR-MCNC: 146 MG/DL — HIGH (ref 70–99)
GLUCOSE BLDC GLUCOMTR-MCNC: 163 MG/DL — HIGH (ref 70–99)
GLUCOSE BLDC GLUCOMTR-MCNC: 199 MG/DL — HIGH (ref 70–99)
GLUCOSE BLDC GLUCOMTR-MCNC: 206 MG/DL — HIGH (ref 70–99)
GLUCOSE BLDC GLUCOMTR-MCNC: 211 MG/DL — HIGH (ref 70–99)
GLUCOSE BLDC GLUCOMTR-MCNC: 216 MG/DL — HIGH (ref 70–99)
GLUCOSE SERPL-MCNC: 207 MG/DL — HIGH (ref 70–99)
HCT VFR BLD CALC: 28.7 % — LOW (ref 39–50)
HGB BLD-MCNC: 8.5 G/DL — LOW (ref 13–17)
MAGNESIUM SERPL-MCNC: 1.9 MG/DL — SIGNIFICANT CHANGE UP (ref 1.6–2.6)
MCHC RBC-ENTMCNC: 23.8 PG — LOW (ref 27–34)
MCHC RBC-ENTMCNC: 29.6 GM/DL — LOW (ref 32–36)
MCV RBC AUTO: 80.4 FL — SIGNIFICANT CHANGE UP (ref 80–100)
NRBC # BLD: 0 /100 WBCS — SIGNIFICANT CHANGE UP (ref 0–0)
PLATELET # BLD AUTO: 535 K/UL — HIGH (ref 150–400)
POTASSIUM SERPL-MCNC: 5.2 MMOL/L — SIGNIFICANT CHANGE UP (ref 3.5–5.3)
POTASSIUM SERPL-SCNC: 5.2 MMOL/L — SIGNIFICANT CHANGE UP (ref 3.5–5.3)
PROT SERPL-MCNC: 6.4 G/DL — SIGNIFICANT CHANGE UP (ref 6–8.3)
RBC # BLD: 3.57 M/UL — LOW (ref 4.2–5.8)
RBC # FLD: 18.7 % — HIGH (ref 10.3–14.5)
SODIUM SERPL-SCNC: 134 MMOL/L — LOW (ref 135–145)
VANCOMYCIN TROUGH SERPL-MCNC: 15.3 UG/ML — SIGNIFICANT CHANGE UP (ref 10–20)
WBC # BLD: 9.56 K/UL — SIGNIFICANT CHANGE UP (ref 3.8–10.5)
WBC # FLD AUTO: 9.56 K/UL — SIGNIFICANT CHANGE UP (ref 3.8–10.5)

## 2020-07-31 PROCEDURE — 99232 SBSQ HOSP IP/OBS MODERATE 35: CPT

## 2020-07-31 PROCEDURE — 99232 SBSQ HOSP IP/OBS MODERATE 35: CPT | Mod: GC

## 2020-07-31 RX ORDER — OXYCODONE HYDROCHLORIDE 5 MG/1
10 TABLET ORAL EVERY 12 HOURS
Refills: 0 | Status: DISCONTINUED | OUTPATIENT
Start: 2020-07-31 | End: 2020-08-07

## 2020-07-31 RX ORDER — GABAPENTIN 400 MG/1
100 CAPSULE ORAL THREE TIMES A DAY
Refills: 0 | Status: DISCONTINUED | OUTPATIENT
Start: 2020-07-31 | End: 2020-08-10

## 2020-07-31 RX ORDER — OXYCODONE HYDROCHLORIDE 5 MG/1
10 TABLET ORAL
Refills: 0 | Status: DISCONTINUED | OUTPATIENT
Start: 2020-07-31 | End: 2020-08-07

## 2020-07-31 RX ORDER — OXYCODONE HYDROCHLORIDE 5 MG/1
5 TABLET ORAL
Refills: 0 | Status: DISCONTINUED | OUTPATIENT
Start: 2020-07-31 | End: 2020-07-31

## 2020-07-31 RX ORDER — MORPHINE SULFATE 50 MG/1
4 CAPSULE, EXTENDED RELEASE ORAL
Refills: 0 | Status: DISCONTINUED | OUTPATIENT
Start: 2020-07-31 | End: 2020-08-07

## 2020-07-31 RX ORDER — HYDROMORPHONE HYDROCHLORIDE 2 MG/ML
1 INJECTION INTRAMUSCULAR; INTRAVENOUS; SUBCUTANEOUS EVERY 4 HOURS
Refills: 0 | Status: DISCONTINUED | OUTPATIENT
Start: 2020-07-31 | End: 2020-07-31

## 2020-07-31 RX ADMIN — OXYCODONE HYDROCHLORIDE 10 MILLIGRAM(S): 5 TABLET ORAL at 23:16

## 2020-07-31 RX ADMIN — Medication 1 APPLICATION(S): at 22:59

## 2020-07-31 RX ADMIN — HEPARIN SODIUM 5000 UNIT(S): 5000 INJECTION INTRAVENOUS; SUBCUTANEOUS at 07:05

## 2020-07-31 RX ADMIN — Medication 25 MILLIGRAM(S): at 06:21

## 2020-07-31 RX ADMIN — Medication 250 MILLIGRAM(S): at 03:23

## 2020-07-31 RX ADMIN — HEPARIN SODIUM 5000 UNIT(S): 5000 INJECTION INTRAVENOUS; SUBCUTANEOUS at 16:21

## 2020-07-31 RX ADMIN — Medication 8 UNIT(S): at 17:53

## 2020-07-31 RX ADMIN — PANTOPRAZOLE SODIUM 40 MILLIGRAM(S): 20 TABLET, DELAYED RELEASE ORAL at 06:21

## 2020-07-31 RX ADMIN — OXYCODONE HYDROCHLORIDE 10 MILLIGRAM(S): 5 TABLET ORAL at 11:10

## 2020-07-31 RX ADMIN — SPIRONOLACTONE 25 MILLIGRAM(S): 25 TABLET, FILM COATED ORAL at 06:23

## 2020-07-31 RX ADMIN — ATORVASTATIN CALCIUM 40 MILLIGRAM(S): 80 TABLET, FILM COATED ORAL at 22:56

## 2020-07-31 RX ADMIN — OXYCODONE HYDROCHLORIDE 10 MILLIGRAM(S): 5 TABLET ORAL at 17:54

## 2020-07-31 RX ADMIN — OXYCODONE HYDROCHLORIDE 10 MILLIGRAM(S): 5 TABLET ORAL at 03:45

## 2020-07-31 RX ADMIN — Medication 81 MILLIGRAM(S): at 17:54

## 2020-07-31 RX ADMIN — Medication 975 MILLIGRAM(S): at 06:20

## 2020-07-31 RX ADMIN — Medication 0.12 MILLIGRAM(S): at 06:21

## 2020-07-31 RX ADMIN — Medication 1 APPLICATION(S): at 13:00

## 2020-07-31 RX ADMIN — OXYCODONE HYDROCHLORIDE 10 MILLIGRAM(S): 5 TABLET ORAL at 02:58

## 2020-07-31 RX ADMIN — Medication 81 MILLIGRAM(S): at 06:21

## 2020-07-31 RX ADMIN — DULOXETINE HYDROCHLORIDE 90 MILLIGRAM(S): 30 CAPSULE, DELAYED RELEASE ORAL at 13:58

## 2020-07-31 RX ADMIN — OXYCODONE HYDROCHLORIDE 10 MILLIGRAM(S): 5 TABLET ORAL at 00:00

## 2020-07-31 RX ADMIN — INSULIN GLARGINE 16 UNIT(S): 100 INJECTION, SOLUTION SUBCUTANEOUS at 22:58

## 2020-07-31 RX ADMIN — Medication 1 INCH(S): at 02:39

## 2020-07-31 RX ADMIN — CHLORHEXIDINE GLUCONATE 1 APPLICATION(S): 213 SOLUTION TOPICAL at 06:34

## 2020-07-31 RX ADMIN — Medication 4: at 12:30

## 2020-07-31 RX ADMIN — OXYCODONE HYDROCHLORIDE 10 MILLIGRAM(S): 5 TABLET ORAL at 06:32

## 2020-07-31 RX ADMIN — GABAPENTIN 100 MILLIGRAM(S): 400 CAPSULE ORAL at 16:21

## 2020-07-31 RX ADMIN — Medication 8 UNIT(S): at 12:30

## 2020-07-31 RX ADMIN — Medication 975 MILLIGRAM(S): at 22:55

## 2020-07-31 RX ADMIN — PRASUGREL 10 MILLIGRAM(S): 5 TABLET, FILM COATED ORAL at 13:57

## 2020-07-31 RX ADMIN — Medication 250 MILLIGRAM(S): at 13:58

## 2020-07-31 RX ADMIN — GABAPENTIN 100 MILLIGRAM(S): 400 CAPSULE ORAL at 22:56

## 2020-07-31 RX ADMIN — Medication 2: at 08:17

## 2020-07-31 RX ADMIN — Medication 4: at 17:53

## 2020-07-31 RX ADMIN — HEPARIN SODIUM 5000 UNIT(S): 5000 INJECTION INTRAVENOUS; SUBCUTANEOUS at 22:59

## 2020-07-31 RX ADMIN — Medication 8 UNIT(S): at 08:30

## 2020-07-31 NOTE — PROGRESS NOTE ADULT - SUBJECTIVE AND OBJECTIVE BOX
INTERVAL HISTORY:  c/o right knee pain, no dyspnea or chest pain. Dizzy when stood yesterday.  Chart, PMH medications and allergies reviewed    MEDICATIONS:  MEDICATIONS  (STANDING):  acetaminophen   Tablet .. 975 milliGRAM(s) Oral every 8 hours  aspirin enteric coated 81 milliGRAM(s) Oral two times a day  atorvastatin 40 milliGRAM(s) Oral at bedtime  chlorhexidine 2% Cloths 1 Application(s) Topical <User Schedule>  dextrose 5%. 1000 milliLiter(s) (50 mL/Hr) IV Continuous <Continuous>  dextrose 50% Injectable 12.5 Gram(s) IV Push once  dextrose 50% Injectable 25 Gram(s) IV Push once  dextrose 50% Injectable 25 Gram(s) IV Push once  digoxin     Tablet 0.125 milliGRAM(s) Oral daily  DULoxetine 90 milliGRAM(s) Oral daily  heparin   Injectable 5000 Unit(s) SubCutaneous every 8 hours  insulin glargine Injectable (LANTUS) 16 Unit(s) SubCutaneous at bedtime  insulin lispro (HumaLOG) corrective regimen sliding scale   SubCutaneous Before meals and at bedtime  insulin lispro Injectable (HumaLOG) 8 Unit(s) SubCutaneous three times a day before meals  lactated ringers. 1000 milliLiter(s) (40 mL/Hr) IV Continuous <Continuous>  metoprolol succinate ER 25 milliGRAM(s) Oral daily  nitroglycerin    2% Ointment 1 Inch(s) Transdermal two times a day  oxyCODONE  ER Tablet 10 milliGRAM(s) Oral every 12 hours  pantoprazole    Tablet 40 milliGRAM(s) Oral before breakfast  polyethylene glycol 3350 17 Gram(s) Oral daily  prasugrel 10 milliGRAM(s) Oral daily  rifAMPin 300 milliGRAM(s) Oral every 12 hours  silver sulfADIAZINE 1% Cream 1 Application(s) Topical two times a day  spironolactone 25 milliGRAM(s) Oral daily  tamsulosin 0.4 milliGRAM(s) Oral at bedtime  vancomycin  IVPB 750 milliGRAM(s) IV Intermittent every 12 hours      REVIEW OF SYSTEMS:  CONSTITUTIONAL: No fever, no weight loss, no fatigue  PULMONARY: No cough, no wheezing, chills no hemoptysis; No Shortness of Breath  CARDIOVASCULAR: No chest pain, no palpitations, no syncope, dizziness, no leg swelling  GASTROINTESTINAL: No abdominal pain. No nausea, no  vomiting, no hematemesis; No diarrhea, no constipation. No melena, no hematochezia.  GENITOURINARY: No dysuria, no frequency, no hematuria, no incontinence  SKIN: No itching, no burning, no rashes, no lesions     PHYSICAL EXAM:  T(C): 37.2 (07-31-20 @ 05:00), Max: 37.3 (07-30-20 @ 21:00)  HR: 86 (07-31-20 @ 05:00) (68 - 90)  BP: 117/68 (07-31-20 @ 05:00) (81/49 - 135/76)  RR: 16 (07-31-20 @ 05:00) (12 - 20)  SpO2: 96% (07-31-20 @ 05:00) (96% - 100%)  Wt(kg): --  I&O's Summary    30 Jul 2020 07:01  -  31 Jul 2020 07:00  --------------------------------------------------------  IN: 250 mL / OUT: 1925 mL / NET: -1675 mL        Appearance:  No deformities, normal appearance	  HEENT:   Normal oral mucosa, PERRL, EOMI	  Neck: No jvd , no masses  Lymphatic: No  lymphadenopathy  Pulmonary: Lungs clear to auscultation and percussion  Cardiovascular: Normal S1 S2, No JVD, No murmur, No edema	  Abdomen:  Soft, Non-tender, + BS  Skin: No rashes, No ecchymoses	  Extremities:  No clubbing or cyanosis  MSK: + joint swelling    LABS:		  CARDIAC MARKERS:                         7.9    9.48  )-----------( 457      ( 30 Jul 2020 06:57 )             25.7   07-30    133<L>  |  98  |  15  ----------------------------<  205<H>  4.2   |  24  |  0.79    Ca    8.0<L>      30 Jul 2020 06:57      proBNP:  Lipid Profile:  HgA1c:  TSH:      TELEMETRY: 	      QTC     ECG:  	   QTC         RADIOLOGY:   DIAGNOSTIC TESTING: [ ] Echocardiogram: [ ]  Catheterization: [ ] Stress Test:      ASSESSMENT/PLAN: 	  MRSA sepsis- patient is at risk for infected leads.  Defibrillator pocket looks good. Normal WBC and afebrile.    Orthostatic dizziness/Severely reduced ejection fraction- He is sedentary however denies dyspnea. Chest x-ray is clear. Rx;d with  metoprolol 25mg daily and digoxin. Started on spironolactone 25 daily. Received IV fluids which were well tolerated. Probably euvolemic.     Coronary artery disease-the patient denies chest discomfort. His EKG is uninterpretable secondary to the pacemaker.  There is no clinical evidence for ischemia. Rx;d with aspirin and atorvastatin.  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     Defibrillator-off monitor, continue metoprolol.    Anemia- transfuse over 7 if cardiac status remains stable. Hemoglobin is improved.

## 2020-07-31 NOTE — PROGRESS NOTE ADULT - SUBJECTIVE AND OBJECTIVE BOX
Ortho Note    Pt c/o burning radiating pain down LLE with PT  Denies CP, SOB, N/V, numbness/tingling     Vital Signs Last 24 Hrs  AVSS    General: Pt Alert and oriented, NAD  DSG C/D/I B/L LE  Pulses trace b/l LE  Sensation intact b/l LE  Motor: EHL/FHL/TA/GS 5/5 b/l                          8.5    9.56  )-----------( 535      ( 31 Jul 2020 12:30 )             28.7   31 Jul 2020 08:42    134    |  100    |  14     ----------------------------<  207    5.2     |  26     |  0.85     Ca    8.3        31 Jul 2020 08:42  Mg     1.9       31 Jul 2020 08:42    TPro  6.4    /  Alb  2.1    /  TBili  0.5    /  DBili  x      /  AST  15     /  ALT  11     /  AlkPhos  118    31 Jul 2020 08:42      A/P: 65yMale POD#9 s/p Right knee abx spacer, also s/p LLE debridement/vac by Vascular  - Stable  - Pain Control  - Gabapentin added  - DVT ppx: asa/effient  - PT, WBS: TTWB  - IV abx    Ortho Pager [phone]

## 2020-07-31 NOTE — PROGRESS NOTE ADULT - ATTENDING COMMENTS
Patient seen and examined with house-staff during bedside rounds.  Resident note read, including vitals, physical findings, laboratory data, and radiological reports.   Revisions included below.  Direct personal management at bed side and extensive interpretation of the data.  Plan was outlined and discussed in details with the housestaff.  Decision making of high complexity  Action taken for acute disease activity to reflect the level of care provided:  - medication reconciliation  - review laboratory data  He is doing well and wound is stable

## 2020-07-31 NOTE — PROGRESS NOTE ADULT - SUBJECTIVE AND OBJECTIVE BOX
SUBJECTIVE:  Doing well.   No overnight events.     OBJECTIVE:     ** VITAL SIGNS / I&O's **    Vital Signs Last 24 Hrs  T(C): 37.2 (31 Jul 2020 05:00), Max: 37.3 (30 Jul 2020 21:00)  T(F): 99 (31 Jul 2020 05:00), Max: 99.2 (30 Jul 2020 21:00)  HR: 86 (31 Jul 2020 05:00) (68 - 89)  BP: 117/68 (31 Jul 2020 05:00) (81/49 - 132/78)  BP(mean): 79 (30 Jul 2020 14:45) (70 - 79)  RR: 16 (31 Jul 2020 05:00) (12 - 20)  SpO2: 96% (31 Jul 2020 05:00) (96% - 100%)      30 Jul 2020 07:01  -  31 Jul 2020 07:00  --------------------------------------------------------  IN:    Solution: 250 mL  Total IN: 250 mL    OUT:    Voided: 1925 mL  Total OUT: 1925 mL    Total NET: -1675 mL          ** PHYSICAL EXAM **    -- CONSTITUTIONAL: Alert, Awake. NAD.   -- RESPIRATORY: unlabored breathing, no respiratory distress  -- Extremities: R knee CDI, wound vac in place holding suction on LLE, SILT BLE, motor function intact        ** LABS **                          7.9    9.48  )-----------( 457      ( 30 Jul 2020 06:57 )             25.7     30 Jul 2020 06:57    133    |  98     |  15     ----------------------------<  205    4.2     |  24     |  0.79     Ca    8.0        30 Jul 2020 06:57        CAPILLARY BLOOD GLUCOSE      POCT Blood Glucose.: 199 mg/dL (31 Jul 2020 07:49)  POCT Blood Glucose.: 163 mg/dL (30 Jul 2020 22:18)  POCT Blood Glucose.: 155 mg/dL (30 Jul 2020 16:43)  POCT Blood Glucose.: 139 mg/dL (30 Jul 2020 11:01)

## 2020-07-31 NOTE — PROGRESS NOTE ADULT - SUBJECTIVE AND OBJECTIVE BOX
Ortho Progress Note    Pt's pain controlled.  No acute events overnight, contuing to wait for skin demarcation. Denies CP, SOB, N/V, numbness/tingling.    Vital Signs Last 24 Hrs  T(C): 37.2 (31 Jul 2020 05:00), Max: 37.3 (30 Jul 2020 21:00)  T(F): 99 (31 Jul 2020 05:00), Max: 99.2 (30 Jul 2020 21:00)  HR: 86 (31 Jul 2020 05:00) (68 - 89)  BP: 117/68 (31 Jul 2020 05:00) (81/49 - 132/78)  BP(mean): 79 (30 Jul 2020 14:45) (70 - 79)  RR: 16 (31 Jul 2020 05:00) (12 - 20)  SpO2: 96% (31 Jul 2020 05:00) (96% - 100%)    Physical Exam:  General: Resting comfortably in bed. AAOx3. NAD.  Extremities:        LLE: Wound VAC in place holding suction, management by plastic surgery team. SILT L2-S1 distribution, symmetric. TA/EHL/FHL/GS motor intact. Feet cool b/l       RLE: Gauze/Silvedine dressing C/D/I. SILT L2-S1 distribution, symmetric. TA/EHL/FHL/GS motor intact. Feet cool b/l, cap refill <3 sec                            7.9    9.48  )-----------( 457      ( 30 Jul 2020 06:57 )             25.7       A/P: 65yMale s/p R total knee explant, antibiotic spacer 7/22  - Stable  - Pain Control  - DVT ppx: ASA/Effient  - Appreciate ID recs: on Vanco, rifampin for 6 weeks from date of last procedure  - Appreciate ID recs -- vanc + rifampin  - Continue to trend WBC/ESR/CRP  - Silvedene for RLE wound twice daily  - Waiting for clear demarcation of R knee wound before attempting repeat I&D  - F/u blood cultures  - PT, WBS: TTWB RLE    Ortho Pager [phone]

## 2020-07-31 NOTE — PROGRESS NOTE ADULT - SUBJECTIVE AND OBJECTIVE BOX
Interval Events: Reviewed  Patient seen and examined at bedside.    Patient is a 65y old  Male who presents with a chief complaint of septic knee (31 Jul 2020 08:13)  he is doing well    PAST MEDICAL & SURGICAL HISTORY:  Diabetic neuropathy  STEMI (ST elevation myocardial infarction)  Diverticulitis  MRSA bacteremia  History of celiac disease  CHF (congestive heart failure): EF ~ 25%  HTN (hypertension)  Diabetes: on insulin pump  Blood clot due to device, implant, or graft: was on blood thinners  HLD (hyperlipidemia)  Osteoarthritis  Atherosclerosis of coronary artery: CAD (coronary artery disease)  Status post percutaneous transluminal coronary angioplasty: in 2012  History of open reduction and internal fixation (ORIF) procedure  Surgery, elective: Right shoulder  Surgery, elective: right knee wound debridement  S/P TKR (total knee replacement), right: with infection Mrsa   per pt he was cleared from MRSA infection  S/P CABG x 1: 2018  Stented coronary artery: 10/18 heart attack  INFERIOR WALL MI  Other postprocedural status: Fixation hardware in foot LEFT      MEDICATIONS:  Pulmonary:    Antimicrobials:  rifAMPin 300 milliGRAM(s) Oral every 12 hours  vancomycin  IVPB 750 milliGRAM(s) IV Intermittent every 12 hours    Anticoagulants:  aspirin enteric coated 81 milliGRAM(s) Oral two times a day  heparin   Injectable 5000 Unit(s) SubCutaneous every 8 hours  prasugrel 10 milliGRAM(s) Oral daily    Cardiac:  digoxin     Tablet 0.125 milliGRAM(s) Oral daily  metoprolol succinate ER 25 milliGRAM(s) Oral daily  nitroglycerin    2% Ointment 1 Inch(s) Transdermal two times a day  spironolactone 25 milliGRAM(s) Oral daily  tamsulosin 0.4 milliGRAM(s) Oral at bedtime      Allergies    ACE inhibitors (Hives)  carvedilol (Other)  enalapril (Hives)  Entresto (Other)    Intolerances        Vital Signs Last 24 Hrs  T(C): 36.8 (01 Aug 2020 06:00), Max: 37.4 (31 Jul 2020 16:44)  T(F): 98.2 (01 Aug 2020 06:00), Max: 99.3 (31 Jul 2020 16:44)  HR: 86 (01 Aug 2020 06:00) (86 - 90)  BP: 111/65 (01 Aug 2020 06:00) (111/65 - 129/75)  BP(mean): --  RR: 16 (01 Aug 2020 06:00) (15 - 18)  SpO2: 97% (01 Aug 2020 06:00) (92% - 97%)    07-31 @ 07:01  -  08-01 @ 07:00  --------------------------------------------------------  IN: 550 mL / OUT: 1900 mL / NET: -1350 mL          Review of Systems:   •	General: negative  •	Skin/Breast: negative  •	Ophthalmologic: negative  •	ENMT: negative  •	Respiratory and Thorax: negative  •	Cardiovascular: negative  •	Gastrointestinal: negative  •	Genitourinary: negative  •	Musculoskeletal: negative  •	Neurological: negative  •	Psychiatric: negative  •	Hematology/Lymphatics: negative  •	Endocrine: negative  •	Allergic/Immunologic: negative    Physical Exam:   • Constitutional:	Well-developed, well nourished  • Eyes:	EOMI; PERRL; no drainage or redness  • ENMT:	No oral lesions; no gross abnormalities  • Neck	No bruits; no thyromegaly or nodules  • Breasts:	not examined  • Back:	No deformity or limitation of movement  • Respiratory:	Breath Sounds equal & clear to percussion & auscultation, no accessory muscle use  • Cardiovascular:	Regular rate & rhythm, normal S1, S2; no murmurs, gallops or rubs; no S3, S4  • Gastrointestinal:	Soft, non-tender, no hepatosplenomegaly, normal bowel sounds  • Genitourinary:	not examined  • Rectal: not examined  • Extremities:	No cyanosis, clubbing or edema  • Vascular:	Equal and normal pulses (carotid, femoral, dorsalis pedis)  • Neurologica:l	not examined  • Skin:	No lesions; no rash  • Lymph Nodes:	No lymphadedenopathy  • Musculoskeletal:	No joint pain, swelling or deformity; no limitation of movement        LABS:      CBC Full  -  ( 31 Jul 2020 12:30 )  WBC Count : 9.56 K/uL  RBC Count : 3.57 M/uL  Hemoglobin : 8.5 g/dL  Hematocrit : 28.7 %  Platelet Count - Automated : 535 K/uL  Mean Cell Volume : 80.4 fl  Mean Cell Hemoglobin : 23.8 pg  Mean Cell Hemoglobin Concentration : 29.6 gm/dL  Auto Neutrophil # : x  Auto Lymphocyte # : x  Auto Monocyte # : x  Auto Eosinophil # : x  Auto Basophil # : x  Auto Neutrophil % : x  Auto Lymphocyte % : x  Auto Monocyte % : x  Auto Eosinophil % : x  Auto Basophil % : x    07-31    134<L>  |  100  |  14  ----------------------------<  207<H>  5.2   |  26  |  0.85    Ca    8.3<L>      31 Jul 2020 08:42  Mg     1.9     07-31    TPro  6.4  /  Alb  2.1<L>  /  TBili  0.5  /  DBili  x   /  AST  15  /  ALT  11  /  AlkPhos  118  07-31                        RADIOLOGY & ADDITIONAL STUDIES (The following images were personally reviewed):  Loja:                                     No  Urine output:                       adequate  DVT prophylaxis:                 Yes  Flattus:                                  Yes  Bowel movement:              No

## 2020-08-01 LAB
ANION GAP SERPL CALC-SCNC: 12 MMOL/L — SIGNIFICANT CHANGE UP (ref 5–17)
B PERT IGG+IGM PNL SER: SIGNIFICANT CHANGE UP
BASOPHILS # BLD AUTO: 0.08 K/UL — SIGNIFICANT CHANGE UP (ref 0–0.2)
BASOPHILS NFR BLD AUTO: 0.9 % — SIGNIFICANT CHANGE UP (ref 0–2)
BUN SERPL-MCNC: 16 MG/DL — SIGNIFICANT CHANGE UP (ref 7–23)
CALCIUM SERPL-MCNC: 8.4 MG/DL — SIGNIFICANT CHANGE UP (ref 8.4–10.5)
CHLORIDE SERPL-SCNC: 100 MMOL/L — SIGNIFICANT CHANGE UP (ref 96–108)
CO2 SERPL-SCNC: 23 MMOL/L — SIGNIFICANT CHANGE UP (ref 22–31)
COLOR FLD: SIGNIFICANT CHANGE UP
CREAT SERPL-MCNC: 0.77 MG/DL — SIGNIFICANT CHANGE UP (ref 0.5–1.3)
EOSINOPHIL # BLD AUTO: 0.27 K/UL — SIGNIFICANT CHANGE UP (ref 0–0.5)
EOSINOPHIL NFR BLD AUTO: 3.1 % — SIGNIFICANT CHANGE UP (ref 0–6)
FLUID INTAKE SUBSTANCE CLASS: SIGNIFICANT CHANGE UP
FLUID SEGMENTED GRANULOCYTES: 79 % — SIGNIFICANT CHANGE UP
GLUCOSE BLDC GLUCOMTR-MCNC: 116 MG/DL — HIGH (ref 70–99)
GLUCOSE BLDC GLUCOMTR-MCNC: 168 MG/DL — HIGH (ref 70–99)
GLUCOSE BLDC GLUCOMTR-MCNC: 177 MG/DL — HIGH (ref 70–99)
GLUCOSE BLDC GLUCOMTR-MCNC: 281 MG/DL — HIGH (ref 70–99)
GLUCOSE SERPL-MCNC: 113 MG/DL — HIGH (ref 70–99)
GRAM STN FLD: SIGNIFICANT CHANGE UP
HCT VFR BLD CALC: 30.7 % — LOW (ref 39–50)
HGB BLD-MCNC: 9 G/DL — LOW (ref 13–17)
IMM GRANULOCYTES NFR BLD AUTO: 0.3 % — SIGNIFICANT CHANGE UP (ref 0–1.5)
LYMPHOCYTES # BLD AUTO: 0.98 K/UL — LOW (ref 1–3.3)
LYMPHOCYTES # BLD AUTO: 11.2 % — LOW (ref 13–44)
MCHC RBC-ENTMCNC: 23.3 PG — LOW (ref 27–34)
MCHC RBC-ENTMCNC: 29.3 GM/DL — LOW (ref 32–36)
MCV RBC AUTO: 79.5 FL — LOW (ref 80–100)
MONOCYTES # BLD AUTO: 0.96 K/UL — HIGH (ref 0–0.9)
MONOCYTES NFR BLD AUTO: 11 % — SIGNIFICANT CHANGE UP (ref 2–14)
MONOS+MACROS # FLD: 21 % — SIGNIFICANT CHANGE UP
NEUTROPHILS # BLD AUTO: 6.4 K/UL — SIGNIFICANT CHANGE UP (ref 1.8–7.4)
NEUTROPHILS NFR BLD AUTO: 73.5 % — SIGNIFICANT CHANGE UP (ref 43–77)
NRBC # BLD: 0 /100 WBCS — SIGNIFICANT CHANGE UP (ref 0–0)
PLATELET # BLD AUTO: 558 K/UL — HIGH (ref 150–400)
POTASSIUM SERPL-MCNC: 4.4 MMOL/L — SIGNIFICANT CHANGE UP (ref 3.5–5.3)
POTASSIUM SERPL-SCNC: 4.4 MMOL/L — SIGNIFICANT CHANGE UP (ref 3.5–5.3)
RBC # BLD: 3.86 M/UL — LOW (ref 4.2–5.8)
RBC # FLD: 18.9 % — HIGH (ref 10.3–14.5)
RCV VOL RI: HIGH /UL (ref 0–0)
SODIUM SERPL-SCNC: 135 MMOL/L — SIGNIFICANT CHANGE UP (ref 135–145)
SPECIMEN SOURCE FLD: SIGNIFICANT CHANGE UP
SPECIMEN SOURCE: SIGNIFICANT CHANGE UP
SYNOVIAL CRYSTALS CLARITY: ABNORMAL
SYNOVIAL CRYSTALS COLOR: ABNORMAL
SYNOVIAL CRYSTALS ID: SIGNIFICANT CHANGE UP
SYNOVIAL CRYSTALS TUBE: SIGNIFICANT CHANGE UP
TOTAL NUCLEATED CELL COUNT, BODY FLUID: SIGNIFICANT CHANGE UP /UL
TUBE TYPE: SIGNIFICANT CHANGE UP
VANCOMYCIN TROUGH SERPL-MCNC: 12.8 UG/ML — SIGNIFICANT CHANGE UP (ref 10–20)
WBC # BLD: 8.72 K/UL — SIGNIFICANT CHANGE UP (ref 3.8–10.5)
WBC # FLD AUTO: 8.72 K/UL — SIGNIFICANT CHANGE UP (ref 3.8–10.5)

## 2020-08-01 PROCEDURE — 99232 SBSQ HOSP IP/OBS MODERATE 35: CPT

## 2020-08-01 RX ADMIN — Medication 975 MILLIGRAM(S): at 20:47

## 2020-08-01 RX ADMIN — GABAPENTIN 100 MILLIGRAM(S): 400 CAPSULE ORAL at 20:47

## 2020-08-01 RX ADMIN — Medication 8 UNIT(S): at 17:54

## 2020-08-01 RX ADMIN — Medication 25 MILLIGRAM(S): at 06:37

## 2020-08-01 RX ADMIN — DULOXETINE HYDROCHLORIDE 90 MILLIGRAM(S): 30 CAPSULE, DELAYED RELEASE ORAL at 12:10

## 2020-08-01 RX ADMIN — OXYCODONE HYDROCHLORIDE 10 MILLIGRAM(S): 5 TABLET ORAL at 06:38

## 2020-08-01 RX ADMIN — Medication 8 UNIT(S): at 08:30

## 2020-08-01 RX ADMIN — Medication 2: at 17:53

## 2020-08-01 RX ADMIN — Medication 6: at 22:05

## 2020-08-01 RX ADMIN — HEPARIN SODIUM 5000 UNIT(S): 5000 INJECTION INTRAVENOUS; SUBCUTANEOUS at 17:31

## 2020-08-01 RX ADMIN — OXYCODONE HYDROCHLORIDE 10 MILLIGRAM(S): 5 TABLET ORAL at 13:15

## 2020-08-01 RX ADMIN — GABAPENTIN 100 MILLIGRAM(S): 400 CAPSULE ORAL at 17:30

## 2020-08-01 RX ADMIN — ATORVASTATIN CALCIUM 40 MILLIGRAM(S): 80 TABLET, FILM COATED ORAL at 20:47

## 2020-08-01 RX ADMIN — Medication 975 MILLIGRAM(S): at 22:02

## 2020-08-01 RX ADMIN — OXYCODONE HYDROCHLORIDE 10 MILLIGRAM(S): 5 TABLET ORAL at 07:30

## 2020-08-01 RX ADMIN — HEPARIN SODIUM 5000 UNIT(S): 5000 INJECTION INTRAVENOUS; SUBCUTANEOUS at 06:38

## 2020-08-01 RX ADMIN — PRASUGREL 10 MILLIGRAM(S): 5 TABLET, FILM COATED ORAL at 12:49

## 2020-08-01 RX ADMIN — Medication 1 APPLICATION(S): at 22:03

## 2020-08-01 RX ADMIN — INSULIN GLARGINE 16 UNIT(S): 100 INJECTION, SOLUTION SUBCUTANEOUS at 22:04

## 2020-08-01 RX ADMIN — Medication 8 UNIT(S): at 12:43

## 2020-08-01 RX ADMIN — CHLORHEXIDINE GLUCONATE 1 APPLICATION(S): 213 SOLUTION TOPICAL at 06:38

## 2020-08-01 RX ADMIN — MORPHINE SULFATE 4 MILLIGRAM(S): 50 CAPSULE, EXTENDED RELEASE ORAL at 20:47

## 2020-08-01 RX ADMIN — Medication 975 MILLIGRAM(S): at 00:00

## 2020-08-01 RX ADMIN — Medication 975 MILLIGRAM(S): at 16:55

## 2020-08-01 RX ADMIN — Medication 975 MILLIGRAM(S): at 15:45

## 2020-08-01 RX ADMIN — OXYCODONE HYDROCHLORIDE 10 MILLIGRAM(S): 5 TABLET ORAL at 17:57

## 2020-08-01 RX ADMIN — Medication 250 MILLIGRAM(S): at 01:49

## 2020-08-01 RX ADMIN — OXYCODONE HYDROCHLORIDE 10 MILLIGRAM(S): 5 TABLET ORAL at 12:09

## 2020-08-01 RX ADMIN — GABAPENTIN 100 MILLIGRAM(S): 400 CAPSULE ORAL at 06:38

## 2020-08-01 RX ADMIN — SPIRONOLACTONE 25 MILLIGRAM(S): 25 TABLET, FILM COATED ORAL at 06:37

## 2020-08-01 RX ADMIN — Medication 81 MILLIGRAM(S): at 17:57

## 2020-08-01 RX ADMIN — Medication 81 MILLIGRAM(S): at 06:37

## 2020-08-01 RX ADMIN — Medication 975 MILLIGRAM(S): at 06:38

## 2020-08-01 RX ADMIN — HEPARIN SODIUM 5000 UNIT(S): 5000 INJECTION INTRAVENOUS; SUBCUTANEOUS at 22:02

## 2020-08-01 RX ADMIN — PANTOPRAZOLE SODIUM 40 MILLIGRAM(S): 20 TABLET, DELAYED RELEASE ORAL at 06:37

## 2020-08-01 RX ADMIN — Medication 0.12 MILLIGRAM(S): at 06:37

## 2020-08-01 RX ADMIN — OXYCODONE HYDROCHLORIDE 10 MILLIGRAM(S): 5 TABLET ORAL at 00:00

## 2020-08-01 RX ADMIN — MORPHINE SULFATE 4 MILLIGRAM(S): 50 CAPSULE, EXTENDED RELEASE ORAL at 20:55

## 2020-08-01 RX ADMIN — Medication 975 MILLIGRAM(S): at 07:30

## 2020-08-01 RX ADMIN — Medication 2: at 08:30

## 2020-08-01 RX ADMIN — Medication 250 MILLIGRAM(S): at 15:45

## 2020-08-01 NOTE — PROGRESS NOTE ADULT - ATTENDING COMMENTS
Seen by me. Right knee dressings changed. Epidermal slough throughout entire prior area of STSG, healthy appearing granulation tissue beginning to form underneath. No purulence. Large effusion aspirated by me at the bedside with about 105cc of hemarthrosis evacuated and sent for cultures and cell counts.     Plan as above. So far no further surgical plans this admission for the right knee; cont Silvadene BID. Mon/Thurs vac changes for the LLE by Plastics, then possibly for d/c home. Encouraged him to continue working with PT toward independent ambulation.

## 2020-08-01 NOTE — PROGRESS NOTE ADULT - SUBJECTIVE AND OBJECTIVE BOX
PLASTIC SURGERY PROGRESS NOTE    ID/ 65M s/p right knee arthroscopic I+D 7/17, right knee explant and abx spacer 7/22, now with debridement of LLE wound with wound vac placement on 7/30.    S/ Patient feels well and has no concerns. He has no pain and his VAC is in situ and functioning appropriately    O/ VAC has good seal and functioning appropriately.     A/P/ No concerns. Will continue to monitor VAC change and remove it early next week for examination of recipient site.     Dictated by Hector Dye (fellow) for Dr. Bowen (Plastic Surgeon)

## 2020-08-01 NOTE — PROGRESS NOTE ADULT - SUBJECTIVE AND OBJECTIVE BOX
Ortho Progress Note    Pt's pain controlled.  No acute events overnight, pain controlled. Denies CP, SOB, N/V, numbness/tingling. Continuing to have some pain but gabapentin added yesterday, will monitor for efficacy.      Vital Signs Last 24 Hrs  T(C): 36.8 (01 Aug 2020 06:00), Max: 37.4 (31 Jul 2020 16:44)  T(F): 98.2 (01 Aug 2020 06:00), Max: 99.3 (31 Jul 2020 16:44)  HR: 86 (01 Aug 2020 06:00) (86 - 90)  BP: 111/65 (01 Aug 2020 06:00) (111/65 - 129/75)  BP(mean): --  RR: 16 (01 Aug 2020 06:00) (15 - 18)  SpO2: 97% (01 Aug 2020 06:00) (92% - 97%)      Physical Exam:  General: Resting comfortably in bed. AAOx3. NAD.  Extremities:        LLE: Wound VAC in place holding suction, management by plastic surgery team. SILT L2-S1 distribution, symmetric. TA/EHL/FHL/GS motor intact. Feet cool b/l       RLE: Gauze/Silvedine dressing C/D/I. SILT L2-S1 distribution, symmetric. TA/EHL/FHL/GS motor intact. Feet cool b/l, cap refill <3 sec                                       8.5    9.56  )-----------( 535      ( 31 Jul 2020 12:30 )             28.7     07-31    134<L>  |  100  |  14  ----------------------------<  207<H>  5.2   |  26  |  0.85    Ca    8.3<L>      31 Jul 2020 08:42  Mg     1.9     07-31    TPro  6.4  /  Alb  2.1<L>  /  TBili  0.5  /  DBili  x   /  AST  15  /  ALT  11  /  AlkPhos  118  07-31      A/P: 65yMale s/p R total knee explant, antibiotic spacer 7/22  - Stable  - Pain Control  - DVT ppx: ASA/Effient  - Appreciate ID recs: on Vanco, rifampin for 6 weeks from date of last procedure  - Appreciate ID recs -- vanc + rifampin  - Continue to trend WBC/ESR/CRP  - Silvedene for RLE wound twice daily  - Waiting for clear demarcation of R knee wound before attempting repeat I&D  - F/u blood cultures  - Labs and Vanc trough today 2PM  - PT, WBS: TTWB RLE    Ortho Pager [phone]

## 2020-08-01 NOTE — PROGRESS NOTE ADULT - SUBJECTIVE AND OBJECTIVE BOX
Interval Events: Reviewed  Patient seen and examined at bedside.    Patient is a 65y old  Male who presents with a chief complaint of septic knee (31 Jul 2020 08:13)  Awake, alert, pain controlled      PAST MEDICAL & SURGICAL HISTORY:  Diabetic neuropathy  STEMI (ST elevation myocardial infarction)  Diverticulitis  MRSA bacteremia  History of celiac disease  CHF (congestive heart failure): EF ~ 25%  HTN (hypertension)  Diabetes: on insulin pump  Blood clot due to device, implant, or graft: was on blood thinners  HLD (hyperlipidemia)  Osteoarthritis  Atherosclerosis of coronary artery: CAD (coronary artery disease)  Status post percutaneous transluminal coronary angioplasty: in 2012  History of open reduction and internal fixation (ORIF) procedure  Surgery, elective: Right shoulder  Surgery, elective: right knee wound debridement  S/P TKR (total knee replacement), right: with infection Mrsa   per pt he was cleared from MRSA infection  S/P CABG x 1: 2018  Stented coronary artery: 10/18 heart attack  INFERIOR WALL MI  Other postprocedural status: Fixation hardware in foot LEFT      MEDICATIONS:  Pulmonary:    Antimicrobials:  rifAMPin 300 milliGRAM(s) Oral every 12 hours  vancomycin  IVPB 750 milliGRAM(s) IV Intermittent every 12 hours    Anticoagulants:  aspirin enteric coated 81 milliGRAM(s) Oral two times a day  heparin   Injectable 5000 Unit(s) SubCutaneous every 8 hours  prasugrel 10 milliGRAM(s) Oral daily    Cardiac:  digoxin     Tablet 0.125 milliGRAM(s) Oral daily  metoprolol succinate ER 25 milliGRAM(s) Oral daily  nitroglycerin    2% Ointment 1 Inch(s) Transdermal two times a day  spironolactone 25 milliGRAM(s) Oral daily  tamsulosin 0.4 milliGRAM(s) Oral at bedtime      Allergies    ACE inhibitors (Hives)  carvedilol (Other)  enalapril (Hives)  Entresto (Other)    Intolerances        Vital Signs Last 24 Hrs  T(C): 36.8 (01 Aug 2020 06:00), Max: 37.4 (31 Jul 2020 16:44)  T(F): 98.2 (01 Aug 2020 06:00), Max: 99.3 (31 Jul 2020 16:44)  HR: 86 (01 Aug 2020 06:00) (86 - 90)  BP: 111/65 (01 Aug 2020 06:00) (111/65 - 129/75)  BP(mean): --  RR: 16 (01 Aug 2020 06:00) (15 - 18)  SpO2: 97% (01 Aug 2020 06:00) (92% - 97%)    07-31 @ 07:01  -  08-01 @ 07:00  --------------------------------------------------------  IN: 550 mL / OUT: 1900 mL / NET: -1350 mL          Review of Systems:   •	General: negative  •	Skin/Breast: negative  •	Ophthalmologic: negative  •	ENMT: negative  •	Respiratory and Thorax: negative  •	Cardiovascular: negative  •	Gastrointestinal: negative  •	Genitourinary: negative  •	Musculoskeletal: negative  •	Neurological: negative  •	Psychiatric: negative  •	Hematology/Lymphatics: negative  •	Endocrine: negative  •	Allergic/Immunologic: negative    Physical Exam:   • Constitutional:	Well-developed, well nourished  • Eyes:	EOMI; PERRL; no drainage or redness  • ENMT:	No oral lesions; no gross abnormalities  • Neck	no thyromegaly or nodules  • Breasts:	not examined  • Back:	No deformity or limitation of movement  • Respiratory:	Breath Sounds equal & clear to  auscultation, no accessory muscle use  • Cardiovascular:	Regular rate & rhythm, normal S1, S2; no murmurs, gallops or rubs; no S3, S4  • Gastrointestinal:	Soft, non-tender, no hepatosplenomegaly, normal bowel sounds  • Genitourinary:	not examined  • Rectal: not examined  • Extremities:	No cyanosis, clubbing or edema, right knee, dressing intact, left LE, wound vac in place w/ good suction  • Vascular:	Equal and normal pulses (dorsalis pedis)  • Neurologica:l	not examined  • Skin:	No lesions; no rash  • Lymph Nodes:	No lymphadedenopathy  • Musculoskeletal:	No joint pain, swelling or deformity; no limitation of movement        LABS:      CBC Full  -  ( 31 Jul 2020 12:30 )  WBC Count : 9.56 K/uL  RBC Count : 3.57 M/uL  Hemoglobin : 8.5 g/dL  Hematocrit : 28.7 %  Platelet Count - Automated : 535 K/uL  Mean Cell Volume : 80.4 fl  Mean Cell Hemoglobin : 23.8 pg  Mean Cell Hemoglobin Concentration : 29.6 gm/dL  Auto Neutrophil # : x  Auto Lymphocyte # : x  Auto Monocyte # : x  Auto Eosinophil # : x  Auto Basophil # : x  Auto Neutrophil % : x  Auto Lymphocyte % : x  Auto Monocyte % : x  Auto Eosinophil % : x  Auto Basophil % : x    07-31    134<L>  |  100  |  14  ----------------------------<  207<H>  5.2   |  26  |  0.85    Ca    8.3<L>      31 Jul 2020 08:42  Mg     1.9     07-31    TPro  6.4  /  Alb  2.1<L>  /  TBili  0.5  /  DBili  x   /  AST  15  /  ALT  11  /  AlkPhos  118  07-31                        RADIOLOGY & ADDITIONAL STUDIES (The following images were personally reviewed):  Loja:                                     No  Urine output:                       adequate  DVT prophylaxis:                 Yes  Flattus:                                  Yes  Bowel movement:              No

## 2020-08-02 LAB
ANION GAP SERPL CALC-SCNC: 8 MMOL/L — SIGNIFICANT CHANGE UP (ref 5–17)
BUN SERPL-MCNC: 19 MG/DL — SIGNIFICANT CHANGE UP (ref 7–23)
CALCIUM SERPL-MCNC: 8.2 MG/DL — LOW (ref 8.4–10.5)
CHLORIDE SERPL-SCNC: 100 MMOL/L — SIGNIFICANT CHANGE UP (ref 96–108)
CO2 SERPL-SCNC: 24 MMOL/L — SIGNIFICANT CHANGE UP (ref 22–31)
CREAT SERPL-MCNC: 0.8 MG/DL — SIGNIFICANT CHANGE UP (ref 0.5–1.3)
CRP SERPL-MCNC: 4.82 MG/DL — HIGH (ref 0–0.4)
ERYTHROCYTE [SEDIMENTATION RATE] IN BLOOD: 73 MM/HR — HIGH
GLUCOSE BLDC GLUCOMTR-MCNC: 105 MG/DL — HIGH (ref 70–99)
GLUCOSE BLDC GLUCOMTR-MCNC: 111 MG/DL — HIGH (ref 70–99)
GLUCOSE BLDC GLUCOMTR-MCNC: 216 MG/DL — HIGH (ref 70–99)
GLUCOSE BLDC GLUCOMTR-MCNC: 258 MG/DL — HIGH (ref 70–99)
GLUCOSE SERPL-MCNC: 263 MG/DL — HIGH (ref 70–99)
HCT VFR BLD CALC: 27.5 % — LOW (ref 39–50)
HGB BLD-MCNC: 8.3 G/DL — LOW (ref 13–17)
MCHC RBC-ENTMCNC: 23.4 PG — LOW (ref 27–34)
MCHC RBC-ENTMCNC: 30.2 GM/DL — LOW (ref 32–36)
MCV RBC AUTO: 77.7 FL — LOW (ref 80–100)
NIGHT BLUE STAIN TISS: SIGNIFICANT CHANGE UP
NRBC # BLD: 0 /100 WBCS — SIGNIFICANT CHANGE UP (ref 0–0)
PLATELET # BLD AUTO: 510 K/UL — HIGH (ref 150–400)
POTASSIUM SERPL-MCNC: 4.6 MMOL/L — SIGNIFICANT CHANGE UP (ref 3.5–5.3)
POTASSIUM SERPL-SCNC: 4.6 MMOL/L — SIGNIFICANT CHANGE UP (ref 3.5–5.3)
RBC # BLD: 3.54 M/UL — LOW (ref 4.2–5.8)
RBC # FLD: 18.6 % — HIGH (ref 10.3–14.5)
SODIUM SERPL-SCNC: 132 MMOL/L — LOW (ref 135–145)
SPECIMEN SOURCE: SIGNIFICANT CHANGE UP
VANCOMYCIN TROUGH SERPL-MCNC: 14.4 UG/ML — SIGNIFICANT CHANGE UP (ref 10–20)
WBC # BLD: 9.33 K/UL — SIGNIFICANT CHANGE UP (ref 3.8–10.5)
WBC # FLD AUTO: 9.33 K/UL — SIGNIFICANT CHANGE UP (ref 3.8–10.5)

## 2020-08-02 PROCEDURE — 99232 SBSQ HOSP IP/OBS MODERATE 35: CPT

## 2020-08-02 RX ADMIN — GABAPENTIN 100 MILLIGRAM(S): 400 CAPSULE ORAL at 06:24

## 2020-08-02 RX ADMIN — PRASUGREL 10 MILLIGRAM(S): 5 TABLET, FILM COATED ORAL at 09:11

## 2020-08-02 RX ADMIN — GABAPENTIN 100 MILLIGRAM(S): 400 CAPSULE ORAL at 22:08

## 2020-08-02 RX ADMIN — MORPHINE SULFATE 4 MILLIGRAM(S): 50 CAPSULE, EXTENDED RELEASE ORAL at 00:38

## 2020-08-02 RX ADMIN — GABAPENTIN 100 MILLIGRAM(S): 400 CAPSULE ORAL at 14:19

## 2020-08-02 RX ADMIN — OXYCODONE HYDROCHLORIDE 10 MILLIGRAM(S): 5 TABLET ORAL at 17:45

## 2020-08-02 RX ADMIN — Medication 1 APPLICATION(S): at 09:12

## 2020-08-02 RX ADMIN — OXYCODONE HYDROCHLORIDE 10 MILLIGRAM(S): 5 TABLET ORAL at 17:16

## 2020-08-02 RX ADMIN — OXYCODONE HYDROCHLORIDE 10 MILLIGRAM(S): 5 TABLET ORAL at 22:07

## 2020-08-02 RX ADMIN — Medication 975 MILLIGRAM(S): at 06:24

## 2020-08-02 RX ADMIN — OXYCODONE HYDROCHLORIDE 10 MILLIGRAM(S): 5 TABLET ORAL at 06:23

## 2020-08-02 RX ADMIN — MORPHINE SULFATE 4 MILLIGRAM(S): 50 CAPSULE, EXTENDED RELEASE ORAL at 11:00

## 2020-08-02 RX ADMIN — HEPARIN SODIUM 5000 UNIT(S): 5000 INJECTION INTRAVENOUS; SUBCUTANEOUS at 14:19

## 2020-08-02 RX ADMIN — PANTOPRAZOLE SODIUM 40 MILLIGRAM(S): 20 TABLET, DELAYED RELEASE ORAL at 06:24

## 2020-08-02 RX ADMIN — Medication 8 UNIT(S): at 17:17

## 2020-08-02 RX ADMIN — OXYCODONE HYDROCHLORIDE 10 MILLIGRAM(S): 5 TABLET ORAL at 23:00

## 2020-08-02 RX ADMIN — Medication 975 MILLIGRAM(S): at 23:00

## 2020-08-02 RX ADMIN — OXYCODONE HYDROCHLORIDE 10 MILLIGRAM(S): 5 TABLET ORAL at 06:25

## 2020-08-02 RX ADMIN — OXYCODONE HYDROCHLORIDE 10 MILLIGRAM(S): 5 TABLET ORAL at 09:40

## 2020-08-02 RX ADMIN — Medication 25 MILLIGRAM(S): at 06:24

## 2020-08-02 RX ADMIN — MORPHINE SULFATE 4 MILLIGRAM(S): 50 CAPSULE, EXTENDED RELEASE ORAL at 03:28

## 2020-08-02 RX ADMIN — Medication 8 UNIT(S): at 07:51

## 2020-08-02 RX ADMIN — Medication 81 MILLIGRAM(S): at 17:16

## 2020-08-02 RX ADMIN — CHLORHEXIDINE GLUCONATE 1 APPLICATION(S): 213 SOLUTION TOPICAL at 06:20

## 2020-08-02 RX ADMIN — HEPARIN SODIUM 5000 UNIT(S): 5000 INJECTION INTRAVENOUS; SUBCUTANEOUS at 22:11

## 2020-08-02 RX ADMIN — Medication 975 MILLIGRAM(S): at 14:20

## 2020-08-02 RX ADMIN — INSULIN GLARGINE 16 UNIT(S): 100 INJECTION, SOLUTION SUBCUTANEOUS at 22:08

## 2020-08-02 RX ADMIN — Medication 975 MILLIGRAM(S): at 14:19

## 2020-08-02 RX ADMIN — Medication 0.12 MILLIGRAM(S): at 06:24

## 2020-08-02 RX ADMIN — Medication 81 MILLIGRAM(S): at 06:24

## 2020-08-02 RX ADMIN — Medication 250 MILLIGRAM(S): at 14:19

## 2020-08-02 RX ADMIN — Medication 4: at 17:16

## 2020-08-02 RX ADMIN — HEPARIN SODIUM 5000 UNIT(S): 5000 INJECTION INTRAVENOUS; SUBCUTANEOUS at 06:26

## 2020-08-02 RX ADMIN — Medication 975 MILLIGRAM(S): at 22:08

## 2020-08-02 RX ADMIN — ATORVASTATIN CALCIUM 40 MILLIGRAM(S): 80 TABLET, FILM COATED ORAL at 22:08

## 2020-08-02 RX ADMIN — OXYCODONE HYDROCHLORIDE 10 MILLIGRAM(S): 5 TABLET ORAL at 17:17

## 2020-08-02 RX ADMIN — SPIRONOLACTONE 25 MILLIGRAM(S): 25 TABLET, FILM COATED ORAL at 06:24

## 2020-08-02 RX ADMIN — MORPHINE SULFATE 4 MILLIGRAM(S): 50 CAPSULE, EXTENDED RELEASE ORAL at 03:40

## 2020-08-02 RX ADMIN — Medication 1 APPLICATION(S): at 22:08

## 2020-08-02 RX ADMIN — Medication 250 MILLIGRAM(S): at 03:16

## 2020-08-02 RX ADMIN — OXYCODONE HYDROCHLORIDE 10 MILLIGRAM(S): 5 TABLET ORAL at 14:19

## 2020-08-02 RX ADMIN — Medication 6: at 07:51

## 2020-08-02 RX ADMIN — DULOXETINE HYDROCHLORIDE 90 MILLIGRAM(S): 30 CAPSULE, DELAYED RELEASE ORAL at 14:19

## 2020-08-02 RX ADMIN — OXYCODONE HYDROCHLORIDE 10 MILLIGRAM(S): 5 TABLET ORAL at 14:50

## 2020-08-02 RX ADMIN — MORPHINE SULFATE 4 MILLIGRAM(S): 50 CAPSULE, EXTENDED RELEASE ORAL at 10:36

## 2020-08-02 RX ADMIN — MORPHINE SULFATE 4 MILLIGRAM(S): 50 CAPSULE, EXTENDED RELEASE ORAL at 00:28

## 2020-08-02 RX ADMIN — OXYCODONE HYDROCHLORIDE 10 MILLIGRAM(S): 5 TABLET ORAL at 09:11

## 2020-08-02 NOTE — PROGRESS NOTE ADULT - SUBJECTIVE AND OBJECTIVE BOX
Ortho Progress Note    Pt's pain controlled.  No acute events overnight, pain controlled. Denies CP, SOB, N/V, numbness/tingling. States he might have overdone it with PT yesterday and put too much weight on leg because it felt sore but feels ok this morning.      Vital Signs Last 24 Hrs  T(C): 36.6 (02 Aug 2020 08:00), Max: 36.8 (01 Aug 2020 16:00)  T(F): 97.9 (02 Aug 2020 08:00), Max: 98.2 (01 Aug 2020 16:00)  HR: 88 (02 Aug 2020 08:00) (81 - 96)  BP: 126/68 (02 Aug 2020 08:00) (96/51 - 126/68)  BP(mean): --  RR: 18 (02 Aug 2020 08:00) (16 - 18)  SpO2: 99% (02 Aug 2020 08:00) (97% - 99%)      Physical Exam:  General: Resting comfortably in bed. AAOx3. NAD.  Extremities:        LLE: Wound VAC in place holding suction, management by plastic surgery team. SILT L2-S1 distribution, symmetric. TA/EHL/FHL/GS motor intact. Feet cool b/l       RLE: Gauze/Silvedine dressing C/D/I. SILT L2-S1 distribution, symmetric. TA/EHL/FHL/GS motor intact. Feet cool b/l, cap refill <3 sec                  Labs:                                   8.3    9.33  )-----------( 510      ( 02 Aug 2020 07:28 )             27.5   08-02    132<L>  |  100  |  19  ----------------------------<  263<H>  4.6   |  24  |  0.80    Ca    8.2<L>      02 Aug 2020 07:27    CRP 4.82, ESR pending      A/P: 65yMale s/p R total knee explant, antibiotic spacer 7/22  - Stable  - Pain Control  - DVT ppx: ASA/Effient  - Appreciate ID recs: on Vanco, rifampin for 6 weeks from date of last procedure. Vanc trough 14.4 8/2 3am, cont current dose  - Continue to trend WBC/ESR/CRP  - Silvedene for RLE wound twice daily  - Waiting for clear demarcation of R knee wound before attempting repeat I&D  - F/u blood cultures  - F/u joint aspirate from 8/1  - Labs and Vanc trough today 2PM  - PT, WBS: TTWB RLE    Ortho Pager [phone] Ortho Progress Note    Pt's pain controlled.  No acute events overnight, pain controlled. Denies CP, SOB, N/V, numbness/tingling. States he might have overdone it with PT yesterday and put too much weight on leg because it felt sore but feels ok this morning.      Vital Signs Last 24 Hrs  T(C): 36.6 (02 Aug 2020 08:00), Max: 36.8 (01 Aug 2020 16:00)  T(F): 97.9 (02 Aug 2020 08:00), Max: 98.2 (01 Aug 2020 16:00)  HR: 88 (02 Aug 2020 08:00) (81 - 96)  BP: 126/68 (02 Aug 2020 08:00) (96/51 - 126/68)  BP(mean): --  RR: 18 (02 Aug 2020 08:00) (16 - 18)  SpO2: 99% (02 Aug 2020 08:00) (97% - 99%)      Physical Exam:  General: Resting comfortably in bed. AAOx3. NAD.  Extremities:        LLE: Wound VAC in place holding suction, management by plastic surgery team. SILT L2-S1 distribution except decreased sensation feet, baseline neuropathy, symmetric. TA/EHL/FHL/GS motor intact. Feet cool b/l       RLE: Gauze/Silvedine dressing C/D/I. SILT L2-S1 distribution except decreased feet (baseline), symmetric. TA/EHL/FHL/GS motor intact. Feet cool b/l, cap refill <3 sec                  Labs:                                   8.3    9.33  )-----------( 510      ( 02 Aug 2020 07:28 )             27.5   08-02    132<L>  |  100  |  19  ----------------------------<  263<H>  4.6   |  24  |  0.80    Ca    8.2<L>      02 Aug 2020 07:27    CRP 4.82, ESR pending      A/P: 65yMale s/p R total knee explant, antibiotic spacer 7/22  - Stable  - Pain Control  - DVT ppx: ASA/Effient  - Appreciate ID recs: on Vanco, rifampin for 6 weeks from date of last procedure. Vanc trough 14.4 8/2 3am, cont current dose  - Continue to trend WBC/ESR/CRP  - Silvedene for RLE wound twice daily  - Waiting for clear demarcation of R knee wound before attempting repeat I&D  - F/u blood cultures  - F/u joint aspirate from 8/1  - Labs and Vanc trough today 2PM  - PT, WBS: TTWB RLE    Ortho Pager [phone]

## 2020-08-02 NOTE — PROGRESS NOTE ADULT - SUBJECTIVE AND OBJECTIVE BOX
Interval Events: Reviewed  Patient seen and examined at bedside.    Patient is a 65y old  Male who presents with a chief complaint of sepsis (01 Aug 2020 09:27)  Pt awake, alert, pain controlled. look forward to PT, enjoying having the overhead trapeze for mobilty      PAST MEDICAL & SURGICAL HISTORY:  Diabetic neuropathy  STEMI (ST elevation myocardial infarction)  Diverticulitis  MRSA bacteremia  History of celiac disease  CHF (congestive heart failure): EF ~ 25%  HTN (hypertension)  Diabetes: on insulin pump  Blood clot due to device, implant, or graft: was on blood thinners  HLD (hyperlipidemia)  Osteoarthritis  Atherosclerosis of coronary artery: CAD (coronary artery disease)  Status post percutaneous transluminal coronary angioplasty: in 2012  History of open reduction and internal fixation (ORIF) procedure  Surgery, elective: Right shoulder  Surgery, elective: right knee wound debridement  S/P TKR (total knee replacement), right: with infection Mrsa   per pt he was cleared from MRSA infection  S/P CABG x 1: 2018  Stented coronary artery: 10/18 heart attack  INFERIOR WALL MI  Other postprocedural status: Fixation hardware in foot LEFT      MEDICATIONS:  Pulmonary:    Antimicrobials:  rifAMPin 300 milliGRAM(s) Oral every 12 hours  vancomycin  IVPB 750 milliGRAM(s) IV Intermittent every 12 hours    Anticoagulants:  aspirin enteric coated 81 milliGRAM(s) Oral two times a day  heparin   Injectable 5000 Unit(s) SubCutaneous every 8 hours  prasugrel 10 milliGRAM(s) Oral daily    Cardiac:  digoxin     Tablet 0.125 milliGRAM(s) Oral daily  metoprolol succinate ER 25 milliGRAM(s) Oral daily  spironolactone 25 milliGRAM(s) Oral daily  tamsulosin 0.4 milliGRAM(s) Oral at bedtime      Allergies    ACE inhibitors (Hives)  carvedilol (Other)  enalapril (Hives)  Entresto (Other)    Intolerances        Vital Signs Last 24 Hrs  T(C): 36.8 (02 Aug 2020 05:16), Max: 36.8 (01 Aug 2020 16:00)  T(F): 98.2 (02 Aug 2020 05:16), Max: 98.2 (01 Aug 2020 16:00)  HR: 81 (02 Aug 2020 05:16) (81 - 96)  BP: 113/73 (02 Aug 2020 05:16) (96/51 - 121/73)  BP(mean): --  RR: 16 (02 Aug 2020 05:16) (16 - 18)  SpO2: 98% (02 Aug 2020 05:16) (97% - 98%)    08-01 @ 07:01  -  08-02 @ 07:00  --------------------------------------------------------  IN: 250 mL / OUT: 500 mL / NET: -250 mL          Review of Systems:   •	General: negative  •	Skin/Breast: negative  •	Ophthalmologic: negative  •	ENMT: negative  •	Respiratory and Thorax: negative  •	Cardiovascular: negative  •	Gastrointestinal: negative  •	Genitourinary: negative  •	Musculoskeletal: negative  •	Neurological: negative  •	Psychiatric: negative  •	Hematology/Lymphatics: negative  •	Endocrine: negative  •	Allergic/Immunologic: negative    Physical Exam:   • Constitutional:	Well-developed, well nourished  • Eyes:	EOMI; PERRL; no drainage or redness  • ENMT:	No oral lesions; no gross abnormalities  • Neck	no thyromegaly or nodules  • Breasts:	not examined  • Back:	No deformity or limitation of movement  • Respiratory:	Breath Sounds equal & clear to auscultation, no accessory muscle use  • Cardiovascular:	Regular rate & rhythm, normal S1, S2; no murmurs, gallops or rubs; no S3, S4  • Gastrointestinal:	Soft, non-tender, no hepatosplenomegaly, normal bowel sounds  • Genitourinary:	not examined  • Rectal: not examined  • Extremities:	No cyanosis, clubbing or edema, right knee, incision clean dry, left leg wound vac in place  • Vascular:	Equal and normal pulses (dorsalis pedis)  • Neurologica:l	not examined  • Skin:	No lesions; no rash  • Lymph Nodes:	No lymphadedenopathy  • Musculoskeletal:	No joint pain, swelling or deformity; no limitation of movement        LABS:      CBC Full  -  ( 02 Aug 2020 07:28 )  WBC Count : 9.33 K/uL  RBC Count : 3.54 M/uL  Hemoglobin : 8.3 g/dL  Hematocrit : 27.5 %  Platelet Count - Automated : 510 K/uL  Mean Cell Volume : 77.7 fl  Mean Cell Hemoglobin : 23.4 pg  Mean Cell Hemoglobin Concentration : 30.2 gm/dL  Auto Neutrophil # : x  Auto Lymphocyte # : x  Auto Monocyte # : x  Auto Eosinophil # : x  Auto Basophil # : x  Auto Neutrophil % : x  Auto Lymphocyte % : x  Auto Monocyte % : x  Auto Eosinophil % : x  Auto Basophil % : x    08-02    132<L>  |  100  |  19  ----------------------------<  263<H>  4.6   |  24  |  0.80    Ca    8.2<L>      02 Aug 2020 07:27  Mg     1.9     07-31    TPro  6.4  /  Alb  2.1<L>  /  TBili  0.5  /  DBili  x   /  AST  15  /  ALT  11  /  AlkPhos  118  07-31                        RADIOLOGY & ADDITIONAL STUDIES (The following images were personally reviewed):  Loja:                                     No  Urine output:                       adequate  DVT prophylaxis:                 Yes  Flattus:                                  Yes  Bowel movement:              No

## 2020-08-03 LAB
ALBUMIN SERPL ELPH-MCNC: 2.3 G/DL — LOW (ref 3.3–5)
ALBUMIN SERPL ELPH-MCNC: 2.6 G/DL — LOW (ref 3.3–5)
ALP SERPL-CCNC: 127 U/L — HIGH (ref 40–120)
ALP SERPL-CCNC: 144 U/L — HIGH (ref 40–120)
ALT FLD-CCNC: 12 U/L — SIGNIFICANT CHANGE UP (ref 10–45)
ALT FLD-CCNC: 9 U/L — LOW (ref 10–45)
ANION GAP SERPL CALC-SCNC: 7 MMOL/L — SIGNIFICANT CHANGE UP (ref 5–17)
AST SERPL-CCNC: 11 U/L — SIGNIFICANT CHANGE UP (ref 10–40)
AST SERPL-CCNC: 25 U/L — SIGNIFICANT CHANGE UP (ref 10–40)
BASOPHILS # BLD AUTO: 0.07 K/UL — SIGNIFICANT CHANGE UP (ref 0–0.2)
BASOPHILS NFR BLD AUTO: 0.7 % — SIGNIFICANT CHANGE UP (ref 0–2)
BILIRUB DIRECT SERPL-MCNC: <0.2 MG/DL — SIGNIFICANT CHANGE UP (ref 0–0.2)
BILIRUB INDIRECT FLD-MCNC: >0.2 MG/DL — SIGNIFICANT CHANGE UP (ref 0.2–1)
BILIRUB SERPL-MCNC: 0.4 MG/DL — SIGNIFICANT CHANGE UP (ref 0.2–1.2)
BILIRUB SERPL-MCNC: 0.4 MG/DL — SIGNIFICANT CHANGE UP (ref 0.2–1.2)
BUN SERPL-MCNC: 18 MG/DL — SIGNIFICANT CHANGE UP (ref 7–23)
CALCIUM SERPL-MCNC: 8.3 MG/DL — LOW (ref 8.4–10.5)
CHLORIDE SERPL-SCNC: 101 MMOL/L — SIGNIFICANT CHANGE UP (ref 96–108)
CO2 SERPL-SCNC: 28 MMOL/L — SIGNIFICANT CHANGE UP (ref 22–31)
CREAT SERPL-MCNC: 1.04 MG/DL — SIGNIFICANT CHANGE UP (ref 0.5–1.3)
EOSINOPHIL # BLD AUTO: 0.43 K/UL — SIGNIFICANT CHANGE UP (ref 0–0.5)
EOSINOPHIL NFR BLD AUTO: 4.2 % — SIGNIFICANT CHANGE UP (ref 0–6)
GLUCOSE BLDC GLUCOMTR-MCNC: 173 MG/DL — HIGH (ref 70–99)
GLUCOSE BLDC GLUCOMTR-MCNC: 182 MG/DL — HIGH (ref 70–99)
GLUCOSE BLDC GLUCOMTR-MCNC: 196 MG/DL — HIGH (ref 70–99)
GLUCOSE BLDC GLUCOMTR-MCNC: 235 MG/DL — HIGH (ref 70–99)
GLUCOSE SERPL-MCNC: 143 MG/DL — HIGH (ref 70–99)
HCT VFR BLD CALC: 24.9 % — LOW (ref 39–50)
HGB BLD-MCNC: 7.5 G/DL — LOW (ref 13–17)
IMM GRANULOCYTES NFR BLD AUTO: 0.7 % — SIGNIFICANT CHANGE UP (ref 0–1.5)
LYMPHOCYTES # BLD AUTO: 0.95 K/UL — LOW (ref 1–3.3)
LYMPHOCYTES # BLD AUTO: 9.3 % — LOW (ref 13–44)
MCHC RBC-ENTMCNC: 23.1 PG — LOW (ref 27–34)
MCHC RBC-ENTMCNC: 30.1 GM/DL — LOW (ref 32–36)
MCV RBC AUTO: 76.9 FL — LOW (ref 80–100)
MONOCYTES # BLD AUTO: 1.04 K/UL — HIGH (ref 0–0.9)
MONOCYTES NFR BLD AUTO: 10.1 % — SIGNIFICANT CHANGE UP (ref 2–14)
NEUTROPHILS # BLD AUTO: 7.7 K/UL — HIGH (ref 1.8–7.4)
NEUTROPHILS NFR BLD AUTO: 75 % — SIGNIFICANT CHANGE UP (ref 43–77)
NRBC # BLD: 0 /100 WBCS — SIGNIFICANT CHANGE UP (ref 0–0)
PLATELET # BLD AUTO: 510 K/UL — HIGH (ref 150–400)
POTASSIUM SERPL-MCNC: 4.4 MMOL/L — SIGNIFICANT CHANGE UP (ref 3.5–5.3)
POTASSIUM SERPL-SCNC: 4.4 MMOL/L — SIGNIFICANT CHANGE UP (ref 3.5–5.3)
PROT SERPL-MCNC: 6.2 G/DL — SIGNIFICANT CHANGE UP (ref 6–8.3)
PROT SERPL-MCNC: 6.7 G/DL — SIGNIFICANT CHANGE UP (ref 6–8.3)
RBC # BLD: 3.24 M/UL — LOW (ref 4.2–5.8)
RBC # FLD: 18.6 % — HIGH (ref 10.3–14.5)
SODIUM SERPL-SCNC: 136 MMOL/L — SIGNIFICANT CHANGE UP (ref 135–145)
VANCOMYCIN TROUGH SERPL-MCNC: 15.7 UG/ML — SIGNIFICANT CHANGE UP (ref 10–20)
WBC # BLD: 10.26 K/UL — SIGNIFICANT CHANGE UP (ref 3.8–10.5)
WBC # FLD AUTO: 10.26 K/UL — SIGNIFICANT CHANGE UP (ref 3.8–10.5)

## 2020-08-03 PROCEDURE — 99232 SBSQ HOSP IP/OBS MODERATE 35: CPT

## 2020-08-03 RX ADMIN — Medication 2: at 22:42

## 2020-08-03 RX ADMIN — Medication 250 MILLIGRAM(S): at 01:01

## 2020-08-03 RX ADMIN — GABAPENTIN 100 MILLIGRAM(S): 400 CAPSULE ORAL at 13:06

## 2020-08-03 RX ADMIN — OXYCODONE HYDROCHLORIDE 10 MILLIGRAM(S): 5 TABLET ORAL at 15:23

## 2020-08-03 RX ADMIN — INSULIN GLARGINE 16 UNIT(S): 100 INJECTION, SOLUTION SUBCUTANEOUS at 22:42

## 2020-08-03 RX ADMIN — Medication 8 UNIT(S): at 18:20

## 2020-08-03 RX ADMIN — Medication 2: at 13:05

## 2020-08-03 RX ADMIN — Medication 81 MILLIGRAM(S): at 17:22

## 2020-08-03 RX ADMIN — Medication 975 MILLIGRAM(S): at 05:08

## 2020-08-03 RX ADMIN — Medication 975 MILLIGRAM(S): at 22:41

## 2020-08-03 RX ADMIN — OXYCODONE HYDROCHLORIDE 10 MILLIGRAM(S): 5 TABLET ORAL at 13:51

## 2020-08-03 RX ADMIN — Medication 81 MILLIGRAM(S): at 05:09

## 2020-08-03 RX ADMIN — OXYCODONE HYDROCHLORIDE 10 MILLIGRAM(S): 5 TABLET ORAL at 06:00

## 2020-08-03 RX ADMIN — Medication 25 MILLIGRAM(S): at 05:09

## 2020-08-03 RX ADMIN — OXYCODONE HYDROCHLORIDE 10 MILLIGRAM(S): 5 TABLET ORAL at 05:08

## 2020-08-03 RX ADMIN — OXYCODONE HYDROCHLORIDE 10 MILLIGRAM(S): 5 TABLET ORAL at 19:52

## 2020-08-03 RX ADMIN — DULOXETINE HYDROCHLORIDE 90 MILLIGRAM(S): 30 CAPSULE, DELAYED RELEASE ORAL at 12:06

## 2020-08-03 RX ADMIN — Medication 250 MILLIGRAM(S): at 13:53

## 2020-08-03 RX ADMIN — Medication 2: at 08:19

## 2020-08-03 RX ADMIN — POLYETHYLENE GLYCOL 3350 17 GRAM(S): 17 POWDER, FOR SOLUTION ORAL at 12:06

## 2020-08-03 RX ADMIN — GABAPENTIN 100 MILLIGRAM(S): 400 CAPSULE ORAL at 05:09

## 2020-08-03 RX ADMIN — HEPARIN SODIUM 5000 UNIT(S): 5000 INJECTION INTRAVENOUS; SUBCUTANEOUS at 05:09

## 2020-08-03 RX ADMIN — Medication 975 MILLIGRAM(S): at 23:46

## 2020-08-03 RX ADMIN — PRASUGREL 10 MILLIGRAM(S): 5 TABLET, FILM COATED ORAL at 12:06

## 2020-08-03 RX ADMIN — CHLORHEXIDINE GLUCONATE 1 APPLICATION(S): 213 SOLUTION TOPICAL at 05:10

## 2020-08-03 RX ADMIN — SPIRONOLACTONE 25 MILLIGRAM(S): 25 TABLET, FILM COATED ORAL at 05:09

## 2020-08-03 RX ADMIN — OXYCODONE HYDROCHLORIDE 10 MILLIGRAM(S): 5 TABLET ORAL at 17:13

## 2020-08-03 RX ADMIN — HEPARIN SODIUM 5000 UNIT(S): 5000 INJECTION INTRAVENOUS; SUBCUTANEOUS at 22:41

## 2020-08-03 RX ADMIN — Medication 8 UNIT(S): at 08:19

## 2020-08-03 RX ADMIN — Medication 975 MILLIGRAM(S): at 13:06

## 2020-08-03 RX ADMIN — Medication 4: at 18:20

## 2020-08-03 RX ADMIN — Medication 975 MILLIGRAM(S): at 06:00

## 2020-08-03 RX ADMIN — HEPARIN SODIUM 5000 UNIT(S): 5000 INJECTION INTRAVENOUS; SUBCUTANEOUS at 13:06

## 2020-08-03 RX ADMIN — ATORVASTATIN CALCIUM 40 MILLIGRAM(S): 80 TABLET, FILM COATED ORAL at 22:41

## 2020-08-03 RX ADMIN — PANTOPRAZOLE SODIUM 40 MILLIGRAM(S): 20 TABLET, DELAYED RELEASE ORAL at 05:10

## 2020-08-03 RX ADMIN — Medication 1 APPLICATION(S): at 12:53

## 2020-08-03 RX ADMIN — OXYCODONE HYDROCHLORIDE 10 MILLIGRAM(S): 5 TABLET ORAL at 18:30

## 2020-08-03 RX ADMIN — Medication 975 MILLIGRAM(S): at 14:18

## 2020-08-03 RX ADMIN — GABAPENTIN 100 MILLIGRAM(S): 400 CAPSULE ORAL at 22:41

## 2020-08-03 RX ADMIN — Medication 1 APPLICATION(S): at 12:06

## 2020-08-03 RX ADMIN — Medication 8 UNIT(S): at 13:04

## 2020-08-03 NOTE — PROGRESS NOTE ADULT - SUBJECTIVE AND OBJECTIVE BOX
Ortho Progress Note    Pt's pain controlled.  No acute events overnight, pain controlled. Denies CP, SOB, N/V, numbness/tingling.    Vital Signs Last 24 Hrs  T(C): 36.6 (03 Aug 2020 04:45), Max: 37 (02 Aug 2020 20:45)  T(F): 97.9 (03 Aug 2020 04:45), Max: 98.6 (02 Aug 2020 20:45)  HR: 91 (03 Aug 2020 04:45) (64 - 91)  BP: 126/75 (03 Aug 2020 04:45) (94/54 - 126/75)  BP(mean): --  RR: 17 (03 Aug 2020 04:45) (17 - 18)  SpO2: 97% (03 Aug 2020 04:45) (94% - 99%)      Physical Exam:  General: Resting comfortably in bed. AAOx3. NAD.  Extremities:        LLE: Wound VAC in place holding suction, management by plastic surgery team. SILT L2-S1 distribution except decreased sensation feet, baseline neuropathy, symmetric. TA/EHL/FHL/GS motor intact. Feet cool b/l       RLE: Gauze/Silvedine dressing C/D/I. SILT L2-S1 distribution except decreased feet (baseline), symmetric. TA/EHL/FHL/GS motor intact. Feet cool b/l, cap refill <3 sec                  Labs:                                              7.5    10.26 )-----------( 510      ( 03 Aug 2020 05:41 )             24.9         A/P: 65yMale s/p R total knee explant, antibiotic spacer 7/22  - Stable  - Pain Control  - DVT ppx: ASA/Effient  - Appreciate ID recs: on Vanco, rifampin for 6 weeks from date of last procedure  - Continue to trend WBC/ESR/CRP  - Silvedene for RLE wound twice daily  - Waiting for clear demarcation of R knee wound before attempting repeat I&D  - F/u blood cultures  - PT, WBS: TTWB RLE    Ortho Pager [phone]

## 2020-08-03 NOTE — PROGRESS NOTE ADULT - SUBJECTIVE AND OBJECTIVE BOX
INTERVAL HISTORY:  Less pain in knee. No chest pain or dyspnea.	  Chart, PMH medications and allergies reviewed    MEDICATIONS:  MEDICATIONS  (STANDING):  acetaminophen   Tablet .. 975 milliGRAM(s) Oral every 8 hours  aspirin enteric coated 81 milliGRAM(s) Oral two times a day  atorvastatin 40 milliGRAM(s) Oral at bedtime  chlorhexidine 2% Cloths 1 Application(s) Topical <User Schedule>  dextrose 5%. 1000 milliLiter(s) (50 mL/Hr) IV Continuous <Continuous>  dextrose 50% Injectable 12.5 Gram(s) IV Push once  dextrose 50% Injectable 25 Gram(s) IV Push once  dextrose 50% Injectable 25 Gram(s) IV Push once  digoxin     Tablet 0.125 milliGRAM(s) Oral daily  DULoxetine 90 milliGRAM(s) Oral daily  gabapentin 100 milliGRAM(s) Oral three times a day  heparin   Injectable 5000 Unit(s) SubCutaneous every 8 hours  insulin glargine Injectable (LANTUS) 16 Unit(s) SubCutaneous at bedtime  insulin lispro (HumaLOG) corrective regimen sliding scale   SubCutaneous Before meals and at bedtime  insulin lispro Injectable (HumaLOG) 8 Unit(s) SubCutaneous three times a day before meals  metoprolol succinate ER 25 milliGRAM(s) Oral daily  oxyCODONE  ER Tablet 10 milliGRAM(s) Oral every 12 hours  pantoprazole    Tablet 40 milliGRAM(s) Oral before breakfast  polyethylene glycol 3350 17 Gram(s) Oral daily  prasugrel 10 milliGRAM(s) Oral daily  rifAMPin 300 milliGRAM(s) Oral every 12 hours  silver sulfADIAZINE 1% Cream 1 Application(s) Topical two times a day  spironolactone 25 milliGRAM(s) Oral daily  tamsulosin 0.4 milliGRAM(s) Oral at bedtime  vancomycin  IVPB 750 milliGRAM(s) IV Intermittent every 12 hours      REVIEW OF SYSTEMS:  CONSTITUTIONAL: No fever, no weight loss, no fatigue  PULMONARY: No cough, no wheezing, chills no hemoptysis; No Shortness of Breath  CARDIOVASCULAR: No chest pain, no palpitations, no syncope, dizziness, no leg swelling  GASTROINTESTINAL: No abdominal pain. No nausea, no  vomiting, no hematemesis; No diarrhea, no constipation. No melena, no hematochezia.  GENITOURINARY: No dysuria, no frequency, no hematuria, no incontinence  SKIN: No itching, no burning, no rashes, no lesions     PHYSICAL EXAM:  T(C): 36.5 (08-03-20 @ 13:24), Max: 37 (08-02-20 @ 20:45)  HR: 78 (08-03-20 @ 13:24) (78 - 91)  BP: 102/46 (08-03-20 @ 13:24) (102/46 - 126/75)  RR: 18 (08-03-20 @ 13:24) (17 - 18)  SpO2: 99% (08-03-20 @ 13:24) (94% - 99%)  Wt(kg): --  I&O's Summary    02 Aug 2020 07:01  -  03 Aug 2020 07:00  --------------------------------------------------------  IN: 250 mL / OUT: 1450 mL / NET: -1200 mL    03 Aug 2020 07:01  -  03 Aug 2020 14:40  --------------------------------------------------------  IN: 0 mL / OUT: 200 mL / NET: -200 mL        Appearance:  No deformities, normal appearance	  HEENT:   Normal oral mucosa, PERRL, EOMI	  Neck: No jvd , no masses  Lymphatic: No  lymphadenopathy  Pulmonary: Lungs clear to auscultation and percussion  Cardiovascular: Normal S1 S2, No JVD, No murmur, No edema	  Abdomen:  Soft, Non-tender, + BS  Skin: No rashes, No ecchymoses	  Extremities:  No clubbing or cyanosis  MSK:  joint swelling    LABS:		  CARDIAC MARKERS:                         7.5    10.26 )-----------( 510      ( 03 Aug 2020 05:41 )             24.9   08-03    136  |  101  |  18  ----------------------------<  143<H>  4.4   |  28  |  1.04    Ca    8.3<L>      03 Aug 2020 05:41    TPro  6.2  /  Alb  2.6<L>  /  TBili  0.4  /  DBili  x   /  AST  11  /  ALT  9<L>  /  AlkPhos  127<H>  08-03    proBNP:  Lipid Profile:  HgA1c:  TSH:      Culture - Fungal, Body Fluid (collected 08-01-20 @ 21:18)  Source: .Body Fluid Right Knee Fluid  Preliminary Report (08-03-20 @ 09:22):    Testing in progress    Culture - Acid Fast - Body Fluid w/Smear (collected 08-01-20 @ 21:18)  Source: Synov Fl Right Knee Fluid    Culture - Body Fluid with Gram Stain (collected 08-01-20 @ 13:28)  Source: .Body Fluid Right Knee Fluid  Gram Stain (08-01-20 @ 17:57):    No organisms seen    Few WBC's  Preliminary Report (08-02-20 @ 11:44):    No growth to date      TELEMETRY: 	      QTC     ECG:  	   QTC         RADIOLOGY:   DIAGNOSTIC TESTING: [ ] Echocardiogram: [ ]  Catheterization: [ ] Stress Test:      ASSESSMENT/PLAN: 	  MRSA sepsis- patient is at risk for infected leads.  Defibrillator pocket looks good. Normal WBC and afebrile.    Orthostatic dizziness/Severely reduced ejection fraction- He is sedentary however denies dyspnea. Chest x-ray is clear. Rx;d with  metoprolol 25mg daily and digoxin. Started on spironolactone 25 daily.  Probably euvolemic.     Coronary artery disease-the patient denies chest discomfort. His EKG is uninterpretable secondary to the pacemaker.  There is no clinical evidence for ischemia. Rx;d with aspirin and atorvastatin.  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     Defibrillator-off monitor, continue metoprolol.    Anemia- transfuse over 7 if cardiac status remains stable. Hemoglobin is adequate.

## 2020-08-03 NOTE — CHART NOTE - NSCHARTNOTEFT_GEN_A_CORE
Admitting Diagnosis:   Patient is a 65y old  Male who presents with a chief complaint of septic knee (03 Aug 2020 14:39)    PAST MEDICAL & SURGICAL HISTORY:  Diabetic neuropathy  STEMI (ST elevation myocardial infarction)  Diverticulitis  MRSA bacteremia  History of celiac disease  CHF (congestive heart failure): EF ~ 25%  HTN (hypertension)  Diabetes: on insulin pump  Blood clot due to device, implant, or graft: was on blood thinners  HLD (hyperlipidemia)  Osteoarthritis  Atherosclerosis of coronary artery: CAD (coronary artery disease)  Status post percutaneous transluminal coronary angioplasty: in 2012  History of open reduction and internal fixation (ORIF) procedure  Surgery, elective: Right shoulder  Surgery, elective: right knee wound debridement  S/P TKR (total knee replacement), right: with infection Mrsa   per pt he was cleared from MRSA infection  S/P CABG x 1: 2018  Stented coronary artery: 10/18 heart attack  INFERIOR WALL MI  Other postprocedural status: Fixation hardware in foot LEFT    Current Nutrition Order: CST CHO diet with evening snack and Glucerna Shakes BID (440 kcal, 20g protein, 400mL H2O)     PO Intake: Good (%) [ x  ]  Fair (50-75%) [   ] Poor (<25%) [   ]    GI Issues:   WDL, last BM 8/2  No n/v/d/c noted  No chewing/ swallowing difficulties noted    Pain:  8/10 right knee pain noted- well managed with pain regime    Skin Integrity:  Surgical incision, arelis score 19  DM ulcers: Left shin, Left second toe, Right foot  No edema present  No pressure ulcers noted    Labs:   08-03    136  |  101  |  18  ----------------------------<  143<H>  4.4   |  28  |  1.04    Ca    8.3<L>      03 Aug 2020 05:41    TPro  6.2  /  Alb  2.6<L>  /  TBili  0.4  /  DBili  x   /  AST  11  /  ALT  9<L>  /  AlkPhos  127<H>  08-03    CAPILLARY BLOOD GLUCOSE    POCT Blood Glucose.: 182 mg/dL (03 Aug 2020 12:54)  POCT Blood Glucose.: 196 mg/dL (03 Aug 2020 07:49)  POCT Blood Glucose.: 105 mg/dL (02 Aug 2020 21:21)  POCT Blood Glucose.: 216 mg/dL (02 Aug 2020 16:53)    Medications:  MEDICATIONS  (STANDING):  acetaminophen   Tablet .. 975 milliGRAM(s) Oral every 8 hours  aspirin enteric coated 81 milliGRAM(s) Oral two times a day  atorvastatin 40 milliGRAM(s) Oral at bedtime  chlorhexidine 2% Cloths 1 Application(s) Topical <User Schedule>  dextrose 5%. 1000 milliLiter(s) (50 mL/Hr) IV Continuous <Continuous>  dextrose 50% Injectable 12.5 Gram(s) IV Push once  dextrose 50% Injectable 25 Gram(s) IV Push once  dextrose 50% Injectable 25 Gram(s) IV Push once  digoxin     Tablet 0.125 milliGRAM(s) Oral daily  DULoxetine 90 milliGRAM(s) Oral daily  gabapentin 100 milliGRAM(s) Oral three times a day  heparin   Injectable 5000 Unit(s) SubCutaneous every 8 hours  insulin glargine Injectable (LANTUS) 16 Unit(s) SubCutaneous at bedtime  insulin lispro (HumaLOG) corrective regimen sliding scale   SubCutaneous Before meals and at bedtime  insulin lispro Injectable (HumaLOG) 8 Unit(s) SubCutaneous three times a day before meals  metoprolol succinate ER 25 milliGRAM(s) Oral daily  oxyCODONE  ER Tablet 10 milliGRAM(s) Oral every 12 hours  pantoprazole    Tablet 40 milliGRAM(s) Oral before breakfast  polyethylene glycol 3350 17 Gram(s) Oral daily  prasugrel 10 milliGRAM(s) Oral daily  rifAMPin 300 milliGRAM(s) Oral every 12 hours  silver sulfADIAZINE 1% Cream 1 Application(s) Topical two times a day  spironolactone 25 milliGRAM(s) Oral daily  tamsulosin 0.4 milliGRAM(s) Oral at bedtime  vancomycin  IVPB 750 milliGRAM(s) IV Intermittent every 12 hours    MEDICATIONS  (PRN):  aluminum hydroxide/magnesium hydroxide/simethicone Suspension 30 milliLiter(s) Oral four times a day PRN Indigestion  bisacodyl Suppository 10 milliGRAM(s) Rectal daily PRN If no bowel movement by POD#2  cyclobenzaprine 10 milliGRAM(s) Oral three times a day PRN Muscle Spasm  dextrose 40% Gel 15 Gram(s) Oral once PRN Blood Glucose LESS THAN 70 milliGRAM(s)/deciliter  glucagon  Injectable 1 milliGRAM(s) IntraMuscular once PRN Glucose LESS THAN 70 milligrams/deciliter  HYDROmorphone  Injectable 1 milliGRAM(s) IV Push every 4 hours PRN Breakthrough pain  magnesium hydroxide Suspension 30 milliLiter(s) Oral daily PRN Constipation  morphine  - Injectable 4 milliGRAM(s) IV Push every 3 hours PRN Severe Pain (7 - 10)  ondansetron Injectable 4 milliGRAM(s) IV Push every 6 hours PRN Nausea and/or Vomiting  oxyCODONE    IR 10 milliGRAM(s) Oral every 3 hours PRN Moderate Pain (4 - 6)  oxyCODONE    IR 5 milliGRAM(s) Oral every 3 hours PRN Mild Pain (1 - 3)  senna 2 Tablet(s) Oral at bedtime PRN Constipation  sodium chloride 0.9% lock flush 10 milliLiter(s) IV Push every 1 hour PRN Pre/post blood products, medications, blood draw, and to maintain line patency    Admitted Anthropometrics:  Height: 74" Weight: 183lbs, IBW 190lbs+/-10%, %IBW 96%, BMI 23.5 kg/m2    Weight:  7/16 183lbs    Weight Change: UBW consistent with admission weight. No new weights obtained this admission. Please trend weights biweekly.     Nutrition Focused Physical Exam: Completed [   ]  Unable to complete [   ]- Unremarkable.     Estimated energy needs:    ABW (83kg) used for calculations as pt between % of IBW. Needs adjusted for advanced age and wound healing s/p I and D.   Kcal (25-30 kcal/kg): 7047-3044 kcal  Protein (1.2-1.4 g/kg pro): 100-116 g pro  Fluids (30-35 ml/kg): 5459-1482 ml    Subjective:   66 yo M with HTN, hyperlipidemia, type II DM with peripheral neuropathy, CAD s/p MIDCAB (2018)/VERONICA, HFrEF, AICD (2/20), pulmonary HTN with recurrent R knee PPJI, likely admitting with septic knee now s/p right knee arthroscopic I&D 7/17 with follow up I&D with antibiotic spacer 7/22. Pt remains on abx course for culture positive for MRSA with negative blood cultures (rifampin and vanco). S/p PICC placement 7/27. Plan for d/c likely later this week per disc with team during rounds, pending PT progress. On assessment, pt is resting in bed comfortably. Currently on CST CHO diet with Glucerna Shakes BID (440 kcal, 20g protein, 400mL H2O). Pt noted eating 50-75% of meals and at least x1 Glucerna daily. Cont to encourage adequate PO intake- pt receptive. EDU provided on review  of CST CHO diet and importance of protein with building strength- pt receptive. Please cont to encourage adeuate PO intake. Please see recs below. RD to follow.     Nutrition Diagnosis: Increased kcal, pro needs RT increased nutrient demand 2/2 wound healing AEB s/p  right knee arthroscopic I&D    [  ] No active nutrition diagnosis at this time  [  ] Current medical condition precludes nutrition intervention    Goal: Consistently meet >75% est needs    Recommendations:  1. Cont with CST CHO diet with evening snack  2. Cont with Glucerna Shakes BID (440 kcal, 20g protein, 400mL H2O)   3. Cont with wound care per team  4. Cont with adequate pain and bowel regime per team  5. RD diet education reenforcement prn  6. Monitor lytes and replete prn    Education: Reenforced nutrition CST CHO diet and wound healing guidelines- pt receptive.     Risk Level: High [   ] Moderate [ x  ] Low [   ]. Admitting Diagnosis:   Patient is a 65y old  Male who presents with a chief complaint of septic knee (03 Aug 2020 14:39)    PAST MEDICAL & SURGICAL HISTORY:  Diabetic neuropathy  STEMI (ST elevation myocardial infarction)  Diverticulitis  MRSA bacteremia  History of celiac disease  CHF (congestive heart failure): EF ~ 25%  HTN (hypertension)  Diabetes: on insulin pump  Blood clot due to device, implant, or graft: was on blood thinners  HLD (hyperlipidemia)  Osteoarthritis  Atherosclerosis of coronary artery: CAD (coronary artery disease)  Status post percutaneous transluminal coronary angioplasty: in 2012  History of open reduction and internal fixation (ORIF) procedure  Surgery, elective: Right shoulder  Surgery, elective: right knee wound debridement  S/P TKR (total knee replacement), right: with infection Mrsa   per pt he was cleared from MRSA infection  S/P CABG x 1: 2018  Stented coronary artery: 10/18 heart attack  INFERIOR WALL MI  Other postprocedural status: Fixation hardware in foot LEFT    Current Nutrition Order: CST CHO diet with evening snack and Glucerna Shakes BID (440 kcal, 20g protein, 400mL H2O)     PO Intake: Good (%) [   ]  Fair (50-75%) [ x  ] Poor (<25%) [   ]    GI Issues:   WDL, last BM 8/2  No n/v/d/c noted  No chewing/ swallowing difficulties noted    Pain:  8/10 right knee pain noted- well managed with pain regime    Skin Integrity:  Surgical incision, arelis score 19  DM ulcers: Left shin, Left second toe, Right foot  No edema present  No pressure ulcers noted    Labs:   08-03    136  |  101  |  18  ----------------------------<  143<H>  4.4   |  28  |  1.04    Ca    8.3<L>      03 Aug 2020 05:41    TPro  6.2  /  Alb  2.6<L>  /  TBili  0.4  /  DBili  x   /  AST  11  /  ALT  9<L>  /  AlkPhos  127<H>  08-03    CAPILLARY BLOOD GLUCOSE    POCT Blood Glucose.: 182 mg/dL (03 Aug 2020 12:54)  POCT Blood Glucose.: 196 mg/dL (03 Aug 2020 07:49)  POCT Blood Glucose.: 105 mg/dL (02 Aug 2020 21:21)  POCT Blood Glucose.: 216 mg/dL (02 Aug 2020 16:53)    Medications:  MEDICATIONS  (STANDING):  acetaminophen   Tablet .. 975 milliGRAM(s) Oral every 8 hours  aspirin enteric coated 81 milliGRAM(s) Oral two times a day  atorvastatin 40 milliGRAM(s) Oral at bedtime  chlorhexidine 2% Cloths 1 Application(s) Topical <User Schedule>  dextrose 5%. 1000 milliLiter(s) (50 mL/Hr) IV Continuous <Continuous>  dextrose 50% Injectable 12.5 Gram(s) IV Push once  dextrose 50% Injectable 25 Gram(s) IV Push once  dextrose 50% Injectable 25 Gram(s) IV Push once  digoxin     Tablet 0.125 milliGRAM(s) Oral daily  DULoxetine 90 milliGRAM(s) Oral daily  gabapentin 100 milliGRAM(s) Oral three times a day  heparin   Injectable 5000 Unit(s) SubCutaneous every 8 hours  insulin glargine Injectable (LANTUS) 16 Unit(s) SubCutaneous at bedtime  insulin lispro (HumaLOG) corrective regimen sliding scale   SubCutaneous Before meals and at bedtime  insulin lispro Injectable (HumaLOG) 8 Unit(s) SubCutaneous three times a day before meals  metoprolol succinate ER 25 milliGRAM(s) Oral daily  oxyCODONE  ER Tablet 10 milliGRAM(s) Oral every 12 hours  pantoprazole    Tablet 40 milliGRAM(s) Oral before breakfast  polyethylene glycol 3350 17 Gram(s) Oral daily  prasugrel 10 milliGRAM(s) Oral daily  rifAMPin 300 milliGRAM(s) Oral every 12 hours  silver sulfADIAZINE 1% Cream 1 Application(s) Topical two times a day  spironolactone 25 milliGRAM(s) Oral daily  tamsulosin 0.4 milliGRAM(s) Oral at bedtime  vancomycin  IVPB 750 milliGRAM(s) IV Intermittent every 12 hours    MEDICATIONS  (PRN):  aluminum hydroxide/magnesium hydroxide/simethicone Suspension 30 milliLiter(s) Oral four times a day PRN Indigestion  bisacodyl Suppository 10 milliGRAM(s) Rectal daily PRN If no bowel movement by POD#2  cyclobenzaprine 10 milliGRAM(s) Oral three times a day PRN Muscle Spasm  dextrose 40% Gel 15 Gram(s) Oral once PRN Blood Glucose LESS THAN 70 milliGRAM(s)/deciliter  glucagon  Injectable 1 milliGRAM(s) IntraMuscular once PRN Glucose LESS THAN 70 milligrams/deciliter  HYDROmorphone  Injectable 1 milliGRAM(s) IV Push every 4 hours PRN Breakthrough pain  magnesium hydroxide Suspension 30 milliLiter(s) Oral daily PRN Constipation  morphine  - Injectable 4 milliGRAM(s) IV Push every 3 hours PRN Severe Pain (7 - 10)  ondansetron Injectable 4 milliGRAM(s) IV Push every 6 hours PRN Nausea and/or Vomiting  oxyCODONE    IR 10 milliGRAM(s) Oral every 3 hours PRN Moderate Pain (4 - 6)  oxyCODONE    IR 5 milliGRAM(s) Oral every 3 hours PRN Mild Pain (1 - 3)  senna 2 Tablet(s) Oral at bedtime PRN Constipation  sodium chloride 0.9% lock flush 10 milliLiter(s) IV Push every 1 hour PRN Pre/post blood products, medications, blood draw, and to maintain line patency    Admitted Anthropometrics:  Height: 74" Weight: 183lbs, IBW 190lbs+/-10%, %IBW 96%, BMI 23.5 kg/m2    Weight:  7/16 183lbs    Weight Change: UBW consistent with admission weight. No new weights obtained this admission. Please trend weights biweekly.     Nutrition Focused Physical Exam: Completed [   ]  Unable to complete [   ]- Unremarkable.     Estimated energy needs:    ABW (83kg) used for calculations as pt between % of IBW. Needs adjusted for advanced age and wound healing s/p I and D.   Kcal (25-30 kcal/kg): 5788-4031 kcal  Protein (1.2-1.4 g/kg pro): 100-116 g pro  Fluids (30-35 ml/kg): 6094-6689 ml    Subjective:   64 yo M with HTN, hyperlipidemia, type II DM with peripheral neuropathy, CAD s/p MIDCAB (2018)/VERONICA, HFrEF, AICD (2/20), pulmonary HTN with recurrent R knee PPJI, likely admitting with septic knee now s/p right knee arthroscopic I&D 7/17 with follow up I&D with antibiotic spacer 7/22. Pt remains on abx course for culture positive for MRSA with negative blood cultures (rifampin and vanco). S/p PICC placement 7/27. Plan for d/c likely later this week per disc with team during rounds, pending PT progress. On assessment, pt is resting in bed comfortably. Currently on CST CHO diet with Glucerna Shakes BID (440 kcal, 20g protein, 400mL H2O). Pt noted eating 50-75% of meals and at least x1 Glucerna daily. Cont to encourage adequate PO intake- pt receptive. EDU provided on review  of CST CHO diet and importance of protein with building strength- pt receptive. Please cont to encourage adeuate PO intake. Please see recs below. RD to follow.     Nutrition Diagnosis: Increased kcal, pro needs RT increased nutrient demand 2/2 wound healing AEB s/p  right knee arthroscopic I&D    [  ] No active nutrition diagnosis at this time  [  ] Current medical condition precludes nutrition intervention    Goal: Consistently meet >75% est needs    Recommendations:  1. Cont with CST CHO diet with evening snack  2. Cont with Glucerna Shakes BID (440 kcal, 20g protein, 400mL H2O)   3. Cont with wound care per team  4. Cont with adequate pain and bowel regime per team  5. RD diet education reenforcement prn  6. Monitor lytes and replete prn    Education: Reenforced nutrition CST CHO diet and wound healing guidelines- pt receptive.     Risk Level: High [   ] Moderate [ x  ] Low [   ].

## 2020-08-03 NOTE — PROGRESS NOTE ADULT - SUBJECTIVE AND OBJECTIVE BOX
SUBJECTIVE:  Doing well.   No overnight events.     OBJECTIVE:     ** VITAL SIGNS / I&O's **    Vital Signs Last 24 Hrs  T(C): 36.6 (03 Aug 2020 04:45), Max: 37 (02 Aug 2020 20:45)  T(F): 97.9 (03 Aug 2020 04:45), Max: 98.6 (02 Aug 2020 20:45)  HR: 91 (03 Aug 2020 04:45) (64 - 91)  BP: 126/75 (03 Aug 2020 04:45) (94/54 - 126/75)  BP(mean): --  RR: 17 (03 Aug 2020 04:45) (17 - 18)  SpO2: 97% (03 Aug 2020 04:45) (94% - 99%)      02 Aug 2020 07:01  -  03 Aug 2020 07:00  --------------------------------------------------------  IN:    Solution: 250 mL  Total IN: 250 mL    OUT:    VAC (Vacuum Assisted Closure) System: 100 mL    Voided: 1350 mL  Total OUT: 1450 mL    Total NET: -1200 mL          ** PHYSICAL EXAM **    -- CONSTITUTIONAL: Alert, Awake. NAD.   -- RESPIRATORY: unlabored breathing, no respiratory distress  -- LLE: wound vac in place holding suction, will be changed today      ** LABS **                          7.5    10.26 )-----------( 510      ( 03 Aug 2020 05:41 )             24.9     03 Aug 2020 05:41    136    |  101    |  18     ----------------------------<  143    4.4     |  28     |  1.04     Ca    8.3        03 Aug 2020 05:41    TPro  6.2    /  Alb  2.6    /  TBili  0.4    /  DBili  x      /  AST  11     /  ALT  9      /  AlkPhos  127    03 Aug 2020 05:41      CAPILLARY BLOOD GLUCOSE      POCT Blood Glucose.: 196 mg/dL (03 Aug 2020 07:49)  POCT Blood Glucose.: 105 mg/dL (02 Aug 2020 21:21)  POCT Blood Glucose.: 216 mg/dL (02 Aug 2020 16:53)  POCT Blood Glucose.: 111 mg/dL (02 Aug 2020 11:49)

## 2020-08-03 NOTE — PROGRESS NOTE ADULT - SUBJECTIVE AND OBJECTIVE BOX
Orthopaedic Surgery Progress Note    Post-operative day #17 s/p R knee arthroscopic I&D, #12 s/p R knee explant and spacer, POD #4 s/p skin graft with wound vac LLE    Subjective:     Patient seen and examined. Patient comfortable without complaints, pain controlled.  Denies chest pain, shortness of breath, nausea/vomiting, numbness/tingling.    Objective:    Vital Signs Last 24 Hrs  T(C): 36.8 (08-03-20 @ 08:57), Max: 36.8 (08-03-20 @ 08:57)  T(F): 98.2 (08-03-20 @ 08:57), Max: 98.2 (08-03-20 @ 08:57)  HR: 83 (08-03-20 @ 08:57) (83 - 83)  BP: 115/70 (08-03-20 @ 08:57) (115/70 - 115/70)  RR: 18 (08-03-20 @ 08:57) (18 - 18)  SpO2: 95% (08-03-20 @ 08:57) (95% - 95%)  AVSS    PE:  General: Patient alert and oriented, NAD  Dressing: Clean/dry/intact  Pulses: 2+ DP BLE   Sensation: SILT BLE   Motor: EHL/FHL/TA/GS 5/5 BLE                          7.5    10.26 )-----------( 510      ( 03 Aug 2020 05:41 )             24.9   03 Aug 2020 05:41    136    |  101    |  18     ----------------------------<  143    4.4     |  28     |  1.04     Ca    8.3        03 Aug 2020 05:41    TPro  6.2    /  Alb  2.6    /  TBili  0.4    /  DBili  x      /  AST  11     /  ALT  9      /  AlkPhos  127    03 Aug 2020 05:41      A/P: 65yMale POD#17 s/p R knee arthroscopic I&D, #12 s/p R knee explant and spacer, POD #4 s/p skin graft with wound vac LLE  1. Pain control as needed  2. DVT prophylaxis: ASA, Effient, SQH  3. PT and OT, weight-bearing status: TTWB RLE  4. L shin wound per plastics. Wound vac changes at bedside Monday and Thursday by plastics.   5. Continue antibiotics. Appreciate ID recs.   6. Dispo: Home with home PT    Ortho Pager [phone] Orthopaedic Surgery Progress Note    Post-operative day #17 s/p R knee arthroscopic I&D, #12 s/p R knee explant and spacer, POD #4 s/p skin graft with wound vac LLE    Subjective:     Patient seen and examined. Patient comfortable without complaints, pain controlled.  Denies chest pain, shortness of breath, nausea/vomiting, numbness/tingling.    Objective:    Vital Signs Last 24 Hrs  T(C): 36.8 (08-03-20 @ 08:57), Max: 36.8 (08-03-20 @ 08:57)  T(F): 98.2 (08-03-20 @ 08:57), Max: 98.2 (08-03-20 @ 08:57)  HR: 83 (08-03-20 @ 08:57) (83 - 83)  BP: 115/70 (08-03-20 @ 08:57) (115/70 - 115/70)  RR: 18 (08-03-20 @ 08:57) (18 - 18)  SpO2: 95% (08-03-20 @ 08:57) (95% - 95%)  AVSS    PE:  General: Patient alert and oriented, NAD  Dressing: Clean/dry/intact  Pulses: 2+ DP BLE   Sensation: SILT BLE   Motor: EHL/FHL/TA/GS 5/5 BLE                          7.5    10.26 )-----------( 510      ( 03 Aug 2020 05:41 )             24.9   03 Aug 2020 05:41    136    |  101    |  18     ----------------------------<  143    4.4     |  28     |  1.04     Ca    8.3        03 Aug 2020 05:41    TPro  6.2    /  Alb  2.6    /  TBili  0.4    /  DBili  x      /  AST  11     /  ALT  9      /  AlkPhos  127    03 Aug 2020 05:41      A/P: 65yMale POD#17 s/p R knee arthroscopic I&D, #12 s/p R knee explant and spacer, POD #4 s/p skin graft with wound vac LLE  1. Pain control as needed  2. DVT prophylaxis: ASA, Effient, SQH  3. PT and OT, weight-bearing status: TTWB RLE  4. L shin wound (79oku3rs) per plastics. Wound vac changes at bedside Monday and Thursday by plastics x 3 weeks.   5. Continue antibiotics. Appreciate ID recs.   6. Dispo: Home with home PT    Ortho Pager [phone]

## 2020-08-04 LAB
ALBUMIN SERPL ELPH-MCNC: 2.6 G/DL — LOW (ref 3.3–5)
ALP SERPL-CCNC: 153 U/L — HIGH (ref 40–120)
ALT FLD-CCNC: 10 U/L — SIGNIFICANT CHANGE UP (ref 10–45)
ANION GAP SERPL CALC-SCNC: 9 MMOL/L — SIGNIFICANT CHANGE UP (ref 5–17)
AST SERPL-CCNC: 13 U/L — SIGNIFICANT CHANGE UP (ref 10–40)
BILIRUB SERPL-MCNC: 0.4 MG/DL — SIGNIFICANT CHANGE UP (ref 0.2–1.2)
BUN SERPL-MCNC: 16 MG/DL — SIGNIFICANT CHANGE UP (ref 7–23)
CALCIUM SERPL-MCNC: 8.4 MG/DL — SIGNIFICANT CHANGE UP (ref 8.4–10.5)
CHLORIDE SERPL-SCNC: 99 MMOL/L — SIGNIFICANT CHANGE UP (ref 96–108)
CO2 SERPL-SCNC: 26 MMOL/L — SIGNIFICANT CHANGE UP (ref 22–31)
CREAT SERPL-MCNC: 0.78 MG/DL — SIGNIFICANT CHANGE UP (ref 0.5–1.3)
CRP SERPL-MCNC: 5.07 MG/DL — HIGH (ref 0–0.4)
ERYTHROCYTE [SEDIMENTATION RATE] IN BLOOD: 63 MM/HR — HIGH
GLUCOSE BLDC GLUCOMTR-MCNC: 132 MG/DL — HIGH (ref 70–99)
GLUCOSE BLDC GLUCOMTR-MCNC: 133 MG/DL — HIGH (ref 70–99)
GLUCOSE BLDC GLUCOMTR-MCNC: 164 MG/DL — HIGH (ref 70–99)
GLUCOSE BLDC GLUCOMTR-MCNC: 205 MG/DL — HIGH (ref 70–99)
GLUCOSE BLDC GLUCOMTR-MCNC: 313 MG/DL — HIGH (ref 70–99)
GLUCOSE SERPL-MCNC: 154 MG/DL — HIGH (ref 70–99)
HCT VFR BLD CALC: 26.8 % — LOW (ref 39–50)
HGB BLD-MCNC: 8 G/DL — LOW (ref 13–17)
MCHC RBC-ENTMCNC: 23.3 PG — LOW (ref 27–34)
MCHC RBC-ENTMCNC: 29.9 GM/DL — LOW (ref 32–36)
MCV RBC AUTO: 77.9 FL — LOW (ref 80–100)
NRBC # BLD: 0 /100 WBCS — SIGNIFICANT CHANGE UP (ref 0–0)
PLATELET # BLD AUTO: 494 K/UL — HIGH (ref 150–400)
POTASSIUM SERPL-MCNC: 5.2 MMOL/L — SIGNIFICANT CHANGE UP (ref 3.5–5.3)
POTASSIUM SERPL-SCNC: 5.2 MMOL/L — SIGNIFICANT CHANGE UP (ref 3.5–5.3)
PROT SERPL-MCNC: 6.3 G/DL — SIGNIFICANT CHANGE UP (ref 6–8.3)
RBC # BLD: 3.44 M/UL — LOW (ref 4.2–5.8)
RBC # FLD: 18.5 % — HIGH (ref 10.3–14.5)
SODIUM SERPL-SCNC: 134 MMOL/L — LOW (ref 135–145)
WBC # BLD: 8.5 K/UL — SIGNIFICANT CHANGE UP (ref 3.8–10.5)
WBC # FLD AUTO: 8.5 K/UL — SIGNIFICANT CHANGE UP (ref 3.8–10.5)

## 2020-08-04 PROCEDURE — 99232 SBSQ HOSP IP/OBS MODERATE 35: CPT | Mod: GC

## 2020-08-04 PROCEDURE — 99232 SBSQ HOSP IP/OBS MODERATE 35: CPT

## 2020-08-04 RX ORDER — HYDROMORPHONE HYDROCHLORIDE 2 MG/ML
4 INJECTION INTRAMUSCULAR; INTRAVENOUS; SUBCUTANEOUS ONCE
Refills: 0 | Status: DISCONTINUED | OUTPATIENT
Start: 2020-08-04 | End: 2020-08-04

## 2020-08-04 RX ADMIN — HEPARIN SODIUM 5000 UNIT(S): 5000 INJECTION INTRAVENOUS; SUBCUTANEOUS at 13:19

## 2020-08-04 RX ADMIN — Medication 975 MILLIGRAM(S): at 23:50

## 2020-08-04 RX ADMIN — Medication 250 MILLIGRAM(S): at 15:15

## 2020-08-04 RX ADMIN — HYDROMORPHONE HYDROCHLORIDE 4 MILLIGRAM(S): 2 INJECTION INTRAMUSCULAR; INTRAVENOUS; SUBCUTANEOUS at 13:17

## 2020-08-04 RX ADMIN — HYDROMORPHONE HYDROCHLORIDE 4 MILLIGRAM(S): 2 INJECTION INTRAMUSCULAR; INTRAVENOUS; SUBCUTANEOUS at 15:00

## 2020-08-04 RX ADMIN — Medication 975 MILLIGRAM(S): at 15:00

## 2020-08-04 RX ADMIN — Medication 25 MILLIGRAM(S): at 05:33

## 2020-08-04 RX ADMIN — Medication 8 UNIT(S): at 17:31

## 2020-08-04 RX ADMIN — HEPARIN SODIUM 5000 UNIT(S): 5000 INJECTION INTRAVENOUS; SUBCUTANEOUS at 05:33

## 2020-08-04 RX ADMIN — Medication 250 MILLIGRAM(S): at 01:54

## 2020-08-04 RX ADMIN — Medication 8: at 23:00

## 2020-08-04 RX ADMIN — ATORVASTATIN CALCIUM 40 MILLIGRAM(S): 80 TABLET, FILM COATED ORAL at 22:20

## 2020-08-04 RX ADMIN — PANTOPRAZOLE SODIUM 40 MILLIGRAM(S): 20 TABLET, DELAYED RELEASE ORAL at 07:11

## 2020-08-04 RX ADMIN — Medication 4: at 17:31

## 2020-08-04 RX ADMIN — GABAPENTIN 100 MILLIGRAM(S): 400 CAPSULE ORAL at 22:20

## 2020-08-04 RX ADMIN — DULOXETINE HYDROCHLORIDE 90 MILLIGRAM(S): 30 CAPSULE, DELAYED RELEASE ORAL at 13:18

## 2020-08-04 RX ADMIN — OXYCODONE HYDROCHLORIDE 10 MILLIGRAM(S): 5 TABLET ORAL at 19:22

## 2020-08-04 RX ADMIN — Medication 975 MILLIGRAM(S): at 05:32

## 2020-08-04 RX ADMIN — MORPHINE SULFATE 4 MILLIGRAM(S): 50 CAPSULE, EXTENDED RELEASE ORAL at 04:35

## 2020-08-04 RX ADMIN — Medication 8 UNIT(S): at 09:11

## 2020-08-04 RX ADMIN — Medication 975 MILLIGRAM(S): at 06:38

## 2020-08-04 RX ADMIN — Medication 2: at 07:12

## 2020-08-04 RX ADMIN — SPIRONOLACTONE 25 MILLIGRAM(S): 25 TABLET, FILM COATED ORAL at 05:33

## 2020-08-04 RX ADMIN — Medication 8 UNIT(S): at 12:58

## 2020-08-04 RX ADMIN — OXYCODONE HYDROCHLORIDE 10 MILLIGRAM(S): 5 TABLET ORAL at 06:38

## 2020-08-04 RX ADMIN — Medication 975 MILLIGRAM(S): at 13:17

## 2020-08-04 RX ADMIN — Medication 81 MILLIGRAM(S): at 05:33

## 2020-08-04 RX ADMIN — INSULIN GLARGINE 16 UNIT(S): 100 INJECTION, SOLUTION SUBCUTANEOUS at 22:59

## 2020-08-04 RX ADMIN — Medication 81 MILLIGRAM(S): at 17:32

## 2020-08-04 RX ADMIN — POLYETHYLENE GLYCOL 3350 17 GRAM(S): 17 POWDER, FOR SOLUTION ORAL at 13:18

## 2020-08-04 RX ADMIN — HEPARIN SODIUM 5000 UNIT(S): 5000 INJECTION INTRAVENOUS; SUBCUTANEOUS at 22:20

## 2020-08-04 RX ADMIN — OXYCODONE HYDROCHLORIDE 10 MILLIGRAM(S): 5 TABLET ORAL at 05:33

## 2020-08-04 RX ADMIN — PRASUGREL 10 MILLIGRAM(S): 5 TABLET, FILM COATED ORAL at 13:18

## 2020-08-04 RX ADMIN — GABAPENTIN 100 MILLIGRAM(S): 400 CAPSULE ORAL at 05:33

## 2020-08-04 RX ADMIN — GABAPENTIN 100 MILLIGRAM(S): 400 CAPSULE ORAL at 13:18

## 2020-08-04 RX ADMIN — OXYCODONE HYDROCHLORIDE 10 MILLIGRAM(S): 5 TABLET ORAL at 17:32

## 2020-08-04 RX ADMIN — CHLORHEXIDINE GLUCONATE 1 APPLICATION(S): 213 SOLUTION TOPICAL at 07:13

## 2020-08-04 RX ADMIN — Medication 1 APPLICATION(S): at 13:18

## 2020-08-04 RX ADMIN — Medication 975 MILLIGRAM(S): at 22:20

## 2020-08-04 RX ADMIN — MORPHINE SULFATE 4 MILLIGRAM(S): 50 CAPSULE, EXTENDED RELEASE ORAL at 04:09

## 2020-08-04 NOTE — PROGRESS NOTE ADULT - SUBJECTIVE AND OBJECTIVE BOX
Ortho Note    Pt seen and examined. Comfortable without complaints. Pain controlled in bed  but reports pain uncontrolled with ambulation with PT.  Denies CP, SOB, N/V, numbness/tingling     Vital Signs Last 24 Hrs  T(C): 37.1 (08-04-20 @ 09:22), Max: 37.1 (08-04-20 @ 09:22)  T(F): 98.8 (08-04-20 @ 09:22), Max: 98.8 (08-04-20 @ 09:22)  HR: 82 (08-04-20 @ 09:22) (82 - 82)  BP: 104/51 (08-04-20 @ 09:22) (104/51 - 104/51)  BP(mean): --  RR: 18 (08-04-20 @ 09:22) (18 - 18)  SpO2: 96% (08-04-20 @ 09:22) (96% - 96%)  AVSS    focused exam:  General: Pt Alert and oriented, NAD  DSG- staples intact to R knee, kerlix in place; wound vac to LLE secured by ace  Pulses: feet cool to touch, at baseline- follows with Dr. Malloy (vascular) outpatient  Sensation: SILT to baseline BLE  Motor: 5/5 EHL/FHL/TA/GS                          8.0    8.50  )-----------( 494      ( 04 Aug 2020 07:41 )             26.8   04 Aug 2020 07:41    134    |  99     |  16     ----------------------------<  154    5.2     |  26     |  0.78     Ca    8.4        04 Aug 2020 07:41    TPro  6.3    /  Alb  2.6    /  TBili  0.4    /  DBili  x      /  AST  13     /  ALT  10     /  AlkPhos  153    04 Aug 2020 07:41      A/P: 65M s/p right knee arthroscopic I+D 7/17, right knee explant and abx spacer 7/22, now with debridement of LLE wound with wound vac placement on 7/30.  - H+H WNL, VSS- continue to monitor labs  - ESR 63 (downtrend), CRP 5.07 (mild uptrend)- continue to monitor q2days  - Pain Control- 1 x dose dilaudid added prior to PT session today; if more effective than oxycodone, will switch from oxy to dilaudid PRN; continue gabapentin  - DVT ppx: effient/ HSQ/ ASA  - PT, WBS: TTWB RLE  - OOB to chair as tolerated, aggressive I/S  - appreciate ID recs- continue vancomycin and rifmapin, follow-up trophs prior to every 4th dose  - appreciate internal medicine recs  - wound vac changes to LLE vascular wound every Monday and Thursday x 3 weeks with Plastics  - dispo: recommended for [facility], but refusing; continue to progress with PT; plan for eventual home with HPT, infusion services, and wound vac dressing changes    Ortho Pager [phone]

## 2020-08-04 NOTE — OCCUPATIONAL THERAPY INITIAL EVALUATION ADULT - NS ASR OT EQUIP NEEDS DISCH
wheelchair/If patient declines rehab and goes home, he will require a light weight wheelchair with elevating leg rests

## 2020-08-04 NOTE — OCCUPATIONAL THERAPY INITIAL EVALUATION ADULT - DIAGNOSIS, OT EVAL
Pt presents TTWB RLE, deficits in RLE flexibility, generalized strength, functional balance, activity tolerance, resulting in decreased ADLs/functional mobility.

## 2020-08-04 NOTE — OCCUPATIONAL THERAPY INITIAL EVALUATION ADULT - RANGE OF MOTION EXAMINATION, LOWER EXTREMITY
R hip and ankle AROM WFL; R knee fixed in extension due to recent surgery/Left LE Active ROM was WFL (within functional limits)

## 2020-08-04 NOTE — PROGRESS NOTE ADULT - SUBJECTIVE AND OBJECTIVE BOX
INTERVAL HISTORY:  c/o L knee pain. Stands but does not walk. No chest pain or dyspnea.	  Chart, PMH medications and allergies reviewed    MEDICATIONS:  MEDICATIONS  (STANDING):  acetaminophen   Tablet .. 975 milliGRAM(s) Oral every 8 hours  aspirin enteric coated 81 milliGRAM(s) Oral two times a day  atorvastatin 40 milliGRAM(s) Oral at bedtime  chlorhexidine 2% Cloths 1 Application(s) Topical <User Schedule>  dextrose 5%. 1000 milliLiter(s) (50 mL/Hr) IV Continuous <Continuous>  dextrose 50% Injectable 12.5 Gram(s) IV Push once  dextrose 50% Injectable 25 Gram(s) IV Push once  dextrose 50% Injectable 25 Gram(s) IV Push once  digoxin     Tablet 0.125 milliGRAM(s) Oral daily  DULoxetine 90 milliGRAM(s) Oral daily  gabapentin 100 milliGRAM(s) Oral three times a day  heparin   Injectable 5000 Unit(s) SubCutaneous every 8 hours  insulin glargine Injectable (LANTUS) 16 Unit(s) SubCutaneous at bedtime  insulin lispro (HumaLOG) corrective regimen sliding scale   SubCutaneous Before meals and at bedtime  insulin lispro Injectable (HumaLOG) 8 Unit(s) SubCutaneous three times a day before meals  metoprolol succinate ER 25 milliGRAM(s) Oral daily  oxyCODONE  ER Tablet 10 milliGRAM(s) Oral every 12 hours  pantoprazole    Tablet 40 milliGRAM(s) Oral before breakfast  polyethylene glycol 3350 17 Gram(s) Oral daily  prasugrel 10 milliGRAM(s) Oral daily  rifAMPin 300 milliGRAM(s) Oral every 12 hours  silver sulfADIAZINE 1% Cream 1 Application(s) Topical two times a day  spironolactone 25 milliGRAM(s) Oral daily  tamsulosin 0.4 milliGRAM(s) Oral at bedtime  vancomycin  IVPB 750 milliGRAM(s) IV Intermittent every 12 hours      REVIEW OF SYSTEMS:  CONSTITUTIONAL: No fever, no weight loss, no fatigue  PULMONARY: No cough, no wheezing, chills no hemoptysis; No Shortness of Breath  CARDIOVASCULAR: No chest pain, no palpitations, no syncope, dizziness, no leg swelling  GASTROINTESTINAL: No abdominal pain. No nausea, no  vomiting, no hematemesis; No diarrhea, no constipation. No melena, no hematochezia.  GENITOURINARY: No dysuria, no frequency, no hematuria, no incontinence  SKIN: No itching, no burning, no rashes, no lesions     PHYSICAL EXAM:  T(C): 36.8 (08-04-20 @ 14:28), Max: 37.1 (08-04-20 @ 09:22)  HR: 80 (08-04-20 @ 15:00) (80 - 90)  BP: 93/53 (08-04-20 @ 15:00) (93/53 - 116/64)  RR: 18 (08-04-20 @ 14:28) (16 - 18)  SpO2: 97% (08-04-20 @ 14:28) (96% - 98%)  Wt(kg): --  I&O's Summary    03 Aug 2020 07:01  -  04 Aug 2020 07:00  --------------------------------------------------------  IN: 0 mL / OUT: 1100 mL / NET: -1100 mL    04 Aug 2020 07:01  -  04 Aug 2020 16:54  --------------------------------------------------------  IN: 240 mL / OUT: 650 mL / NET: -410 mL        Appearance:  No deformities, normal appearance	  HEENT:   Normal oral mucosa, PERRL, EOMI	  Neck: No jvd , no masses  Lymphatic: No  lymphadenopathy  Pulmonary: Lungs clear to auscultation and percussion  Cardiovascular: Normal S1 S2, No JVD, No murmur, No edema	  Abdomen:  Soft, Non-tender, + BS  Skin: No rashes, No ecchymoses	  Extremities:  No clubbing or cyanosis  MSK: + joint swelling    LABS:		  CARDIAC MARKERS:                         8.0    8.50  )-----------( 494      ( 04 Aug 2020 07:41 )             26.8   08-04    134<L>  |  99  |  16  ----------------------------<  154<H>  5.2   |  26  |  0.78    Ca    8.4      04 Aug 2020 07:41    TPro  6.3  /  Alb  2.6<L>  /  TBili  0.4  /  DBili  x   /  AST  13  /  ALT  10  /  AlkPhos  153<H>  08-04    proBNP:  Lipid Profile:  HgA1c:  TSH:      Culture - Fungal, Body Fluid (collected 08-01-20 @ 21:18)  Source: .Body Fluid Right Knee Fluid  Preliminary Report (08-03-20 @ 09:22):    Testing in progress    Culture - Acid Fast - Body Fluid w/Smear (collected 08-01-20 @ 21:18)  Source: Synov Fl Right Knee Fluid    Culture - Body Fluid with Gram Stain (collected 08-01-20 @ 13:28)  Source: .Body Fluid Right Knee Fluid  Gram Stain (08-01-20 @ 17:57):    No organisms seen    Few WBC's  Preliminary Report (08-02-20 @ 11:44):    No growth to date      TELEMETRY: 	      QTC     ECG:  	   QTC         RADIOLOGY:   DIAGNOSTIC TESTING: [ ] Echocardiogram: [ ]  Catheterization: [ ] Stress Test:      ASSESSMENT/PLAN: 	  Septic knee- Normal WBC and afebrile. Esr is improved, CRP slightly higher.    Orthostatic dizziness/Severely reduced ejection fraction- He is sedentary however denies dyspnea. Continue metoprolol 25mg daily and digoxin. Continue spironolactone 25 daily.  Probably euvolemic.     Coronary artery disease-the patient denies chest discomfort. His EKG is uninterpretable secondary to the pacemaker.  There is no clinical evidence for ischemia. Rx;d with aspirin and atorvastatin.  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     Defibrillator-off monitor, continue metoprolol.

## 2020-08-04 NOTE — OCCUPATIONAL THERAPY INITIAL EVALUATION ADULT - PLANNED THERAPY INTERVENTIONS, OT EVAL
strengthening/ADL retraining/balance training/ROM/bed mobility training/parent/caregiver training.../transfer training

## 2020-08-04 NOTE — OCCUPATIONAL THERAPY INITIAL EVALUATION ADULT - TRANSFER SAFETY CONCERNS NOTED: SIT/STAND, REHAB EVAL
inability to maintain weight-bearing restrictions w/o assist/decreased step length/decreased weight-shifting ability/losing balance

## 2020-08-04 NOTE — OCCUPATIONAL THERAPY INITIAL EVALUATION ADULT - RANGE OF MOTION EXAMINATION, UPPER EXTREMITY
bilateral UE Active ROM was WFL  (within functional limits)/B shoulders/elbows/forearms 4/5; Bilateral wrist/digits 4-/5 with hx of neuropathy

## 2020-08-04 NOTE — OCCUPATIONAL THERAPY INITIAL EVALUATION ADULT - LIVES WITH, PROFILE
Pt lives with spouse in private home with ~6 steps to enter (+railing)/~16 steps inside (8 & 8, +railing)/spouse

## 2020-08-04 NOTE — PROGRESS NOTE ADULT - SUBJECTIVE AND OBJECTIVE BOX
INTERVAL HPI/OVERNIGHT EVENTS:  Some pain in right knee;  Antibiotics noted; Orthopedic follow up noted      MEDICATIONS  (STANDING):  acetaminophen   Tablet .. 975 milliGRAM(s) Oral every 8 hours  aspirin enteric coated 81 milliGRAM(s) Oral two times a day  atorvastatin 40 milliGRAM(s) Oral at bedtime  chlorhexidine 2% Cloths 1 Application(s) Topical <User Schedule>  dextrose 5%. 1000 milliLiter(s) (50 mL/Hr) IV Continuous <Continuous>  dextrose 50% Injectable 12.5 Gram(s) IV Push once  dextrose 50% Injectable 25 Gram(s) IV Push once  dextrose 50% Injectable 25 Gram(s) IV Push once  digoxin     Tablet 0.125 milliGRAM(s) Oral daily  DULoxetine 90 milliGRAM(s) Oral daily  gabapentin 100 milliGRAM(s) Oral three times a day  heparin   Injectable 5000 Unit(s) SubCutaneous every 8 hours  insulin glargine Injectable (LANTUS) 16 Unit(s) SubCutaneous at bedtime  insulin lispro (HumaLOG) corrective regimen sliding scale   SubCutaneous Before meals and at bedtime  insulin lispro Injectable (HumaLOG) 8 Unit(s) SubCutaneous three times a day before meals  metoprolol succinate ER 25 milliGRAM(s) Oral daily  oxyCODONE  ER Tablet 10 milliGRAM(s) Oral every 12 hours  pantoprazole    Tablet 40 milliGRAM(s) Oral before breakfast  polyethylene glycol 3350 17 Gram(s) Oral daily  prasugrel 10 milliGRAM(s) Oral daily  rifAMPin 300 milliGRAM(s) Oral every 12 hours  silver sulfADIAZINE 1% Cream 1 Application(s) Topical two times a day  spironolactone 25 milliGRAM(s) Oral daily  tamsulosin 0.4 milliGRAM(s) Oral at bedtime  vancomycin  IVPB 750 milliGRAM(s) IV Intermittent every 12 hours    MEDICATIONS  (PRN):  aluminum hydroxide/magnesium hydroxide/simethicone Suspension 30 milliLiter(s) Oral four times a day PRN Indigestion  bisacodyl Suppository 10 milliGRAM(s) Rectal daily PRN If no bowel movement by POD#2  cyclobenzaprine 10 milliGRAM(s) Oral three times a day PRN Muscle Spasm  dextrose 40% Gel 15 Gram(s) Oral once PRN Blood Glucose LESS THAN 70 milliGRAM(s)/deciliter  glucagon  Injectable 1 milliGRAM(s) IntraMuscular once PRN Glucose LESS THAN 70 milligrams/deciliter  HYDROmorphone  Injectable 1 milliGRAM(s) IV Push every 4 hours PRN Breakthrough pain  magnesium hydroxide Suspension 30 milliLiter(s) Oral daily PRN Constipation  morphine  - Injectable 4 milliGRAM(s) IV Push every 3 hours PRN Severe Pain (7 - 10)  ondansetron Injectable 4 milliGRAM(s) IV Push every 6 hours PRN Nausea and/or Vomiting  oxyCODONE    IR 10 milliGRAM(s) Oral every 3 hours PRN Moderate Pain (4 - 6)  oxyCODONE    IR 5 milliGRAM(s) Oral every 3 hours PRN Mild Pain (1 - 3)  senna 2 Tablet(s) Oral at bedtime PRN Constipation  sodium chloride 0.9% lock flush 10 milliLiter(s) IV Push every 1 hour PRN Pre/post blood products, medications, blood draw, and to maintain line patency      Allergies    ACE inhibitors (Hives)  carvedilol (Other)  enalapril (Hives)  Entresto (Other)    Intolerances        Vital Signs Last 24 Hrs  T(C): 37.1 (04 Aug 2020 09:22), Max: 37.1 (04 Aug 2020 09:22)  T(F): 98.8 (04 Aug 2020 09:22), Max: 98.8 (04 Aug 2020 09:22)  HR: 82 (04 Aug 2020 09:22) (78 - 90)  BP: 104/51 (04 Aug 2020 09:22) (102/46 - 116/64)  BP(mean): --  RR: 18 (04 Aug 2020 09:22) (16 - 18)  SpO2: 96% (04 Aug 2020 09:22) (96% - 99%)          Constitutional:  Awake    Eyes: DAMARIS    ENMT: Negative    Neck: Supple    Back:  no tenderness     Respiratory:  clear    Cardiovascular: S1 S2    Gastrointestinal:  soft    Genitourinary:    Extremities: no change    Vascular:    Neurological:    Skin:    Lymph Nodes:            08-03 @ 07:01  -  08-04 @ 07:00  --------------------------------------------------------  IN: 0 mL / OUT: 1100 mL / NET: -1100 mL      LABS:                        8.0    8.50  )-----------( 494      ( 04 Aug 2020 07:41 )             26.8     08-04    134<L>  |  99  |  16  ----------------------------<  154<H>  5.2   |  26  |  0.78    Ca    8.4      04 Aug 2020 07:41    TPro  6.3  /  Alb  2.6<L>  /  TBili  0.4  /  DBili  x   /  AST  13  /  ALT  10  /  AlkPhos  153<H>  08-04          RADIOLOGY & ADDITIONAL TESTS:

## 2020-08-04 NOTE — OCCUPATIONAL THERAPY INITIAL EVALUATION ADULT - IMPAIRED TRANSFERS: SIT/STAND, REHAB EVAL
impaired balance/decreased strength/pain/decreased flexibility/impaired postural control/decreased ROM

## 2020-08-04 NOTE — PROGRESS NOTE ADULT - SUBJECTIVE AND OBJECTIVE BOX
Ortho Progress Note    Pt's pain controlled.  No acute events overnight, pain controlled. Denies CP, SOB, N/V, numbness/tingling.    Vital Signs Last 24 Hrs  T(C): 36.8 (04 Aug 2020 02:08), Max: 36.8 (03 Aug 2020 08:57)  T(F): 98.2 (04 Aug 2020 02:08), Max: 98.2 (03 Aug 2020 08:57)  HR: 86 (04 Aug 2020 04:15) (78 - 90)  BP: 114/68 (04 Aug 2020 04:15) (102/46 - 116/64)  BP(mean): --  RR: 18 (04 Aug 2020 04:15) (16 - 18)  SpO2: 98% (04 Aug 2020 04:15) (95% - 99%)      Physical Exam:  General: Resting comfortably in bed. AAOx3. NAD.  Extremities:        LLE: Wound VAC in place holding suction, management by plastic surgery team. SILT L2-S1 distribution except decreased sensation feet, baseline neuropathy, symmetric. TA/EHL/FHL/GS motor intact. Feet cool b/l       RLE: Gauze/Silvedine dressing C/D/I. SILT L2-S1 distribution except decreased feet (baseline), symmetric. TA/EHL/FHL/GS motor intact. Feet cool b/l, cap refill <3 sec                  Labs:                                                         8.0    8.50  )-----------( 494      ( 04 Aug 2020 07:41 )             26.8         A/P: 65yMale s/p R total knee explant, antibiotic spacer 7/22  - Stable  - Pain Control  - DVT ppx: ASA/Effient  - Appreciate ID recs: on Vanco, rifampin for 6 weeks from date of last procedure  - Continue to trend WBC/ESR/CRP  - Silvedene for RLE wound twice daily  - Waiting for clear demarcation of R knee wound before attempting repeat I&D  - F/u blood cultures  - PT, WBS: TTWB RLE    Ortho Pager [phone]

## 2020-08-05 ENCOUNTER — RX RENEWAL (OUTPATIENT)
Age: 65
End: 2020-08-05

## 2020-08-05 LAB
ANION GAP SERPL CALC-SCNC: 8 MMOL/L — SIGNIFICANT CHANGE UP (ref 5–17)
BUN SERPL-MCNC: 21 MG/DL — SIGNIFICANT CHANGE UP (ref 7–23)
CALCIUM SERPL-MCNC: 8.4 MG/DL — SIGNIFICANT CHANGE UP (ref 8.4–10.5)
CHLORIDE SERPL-SCNC: 99 MMOL/L — SIGNIFICANT CHANGE UP (ref 96–108)
CO2 SERPL-SCNC: 27 MMOL/L — SIGNIFICANT CHANGE UP (ref 22–31)
CREAT SERPL-MCNC: 0.87 MG/DL — SIGNIFICANT CHANGE UP (ref 0.5–1.3)
GLUCOSE BLDC GLUCOMTR-MCNC: 172 MG/DL — HIGH (ref 70–99)
GLUCOSE BLDC GLUCOMTR-MCNC: 195 MG/DL — HIGH (ref 70–99)
GLUCOSE BLDC GLUCOMTR-MCNC: 200 MG/DL — HIGH (ref 70–99)
GLUCOSE BLDC GLUCOMTR-MCNC: 203 MG/DL — HIGH (ref 70–99)
GLUCOSE BLDC GLUCOMTR-MCNC: 255 MG/DL — HIGH (ref 70–99)
GLUCOSE SERPL-MCNC: 184 MG/DL — HIGH (ref 70–99)
HCT VFR BLD CALC: 26.3 % — LOW (ref 39–50)
HGB BLD-MCNC: 7.8 G/DL — LOW (ref 13–17)
MAGNESIUM SERPL-MCNC: 1.8 MG/DL — SIGNIFICANT CHANGE UP (ref 1.6–2.6)
MCHC RBC-ENTMCNC: 22.9 PG — LOW (ref 27–34)
MCHC RBC-ENTMCNC: 29.7 GM/DL — LOW (ref 32–36)
MCV RBC AUTO: 77.1 FL — LOW (ref 80–100)
NRBC # BLD: 0 /100 WBCS — SIGNIFICANT CHANGE UP (ref 0–0)
PLATELET # BLD AUTO: 516 K/UL — HIGH (ref 150–400)
POTASSIUM SERPL-MCNC: 4.7 MMOL/L — SIGNIFICANT CHANGE UP (ref 3.5–5.3)
POTASSIUM SERPL-SCNC: 4.7 MMOL/L — SIGNIFICANT CHANGE UP (ref 3.5–5.3)
RBC # BLD: 3.41 M/UL — LOW (ref 4.2–5.8)
RBC # FLD: 18.9 % — HIGH (ref 10.3–14.5)
SODIUM SERPL-SCNC: 134 MMOL/L — LOW (ref 135–145)
VANCOMYCIN TROUGH SERPL-MCNC: 15.7 UG/ML — SIGNIFICANT CHANGE UP (ref 10–20)
WBC # BLD: 8.42 K/UL — SIGNIFICANT CHANGE UP (ref 3.8–10.5)
WBC # FLD AUTO: 8.42 K/UL — SIGNIFICANT CHANGE UP (ref 3.8–10.5)

## 2020-08-05 RX ADMIN — Medication 8 UNIT(S): at 12:46

## 2020-08-05 RX ADMIN — OXYCODONE HYDROCHLORIDE 10 MILLIGRAM(S): 5 TABLET ORAL at 16:38

## 2020-08-05 RX ADMIN — Medication 975 MILLIGRAM(S): at 05:20

## 2020-08-05 RX ADMIN — Medication 250 MILLIGRAM(S): at 03:08

## 2020-08-05 RX ADMIN — Medication 2: at 18:00

## 2020-08-05 RX ADMIN — GABAPENTIN 100 MILLIGRAM(S): 400 CAPSULE ORAL at 13:14

## 2020-08-05 RX ADMIN — HEPARIN SODIUM 5000 UNIT(S): 5000 INJECTION INTRAVENOUS; SUBCUTANEOUS at 13:14

## 2020-08-05 RX ADMIN — Medication 975 MILLIGRAM(S): at 23:15

## 2020-08-05 RX ADMIN — CHLORHEXIDINE GLUCONATE 1 APPLICATION(S): 213 SOLUTION TOPICAL at 05:40

## 2020-08-05 RX ADMIN — Medication 25 MILLIGRAM(S): at 05:20

## 2020-08-05 RX ADMIN — Medication 81 MILLIGRAM(S): at 18:00

## 2020-08-05 RX ADMIN — MORPHINE SULFATE 4 MILLIGRAM(S): 50 CAPSULE, EXTENDED RELEASE ORAL at 22:15

## 2020-08-05 RX ADMIN — Medication 4: at 07:33

## 2020-08-05 RX ADMIN — DULOXETINE HYDROCHLORIDE 90 MILLIGRAM(S): 30 CAPSULE, DELAYED RELEASE ORAL at 12:47

## 2020-08-05 RX ADMIN — Medication 975 MILLIGRAM(S): at 13:15

## 2020-08-05 RX ADMIN — Medication 250 MILLIGRAM(S): at 13:14

## 2020-08-05 RX ADMIN — SPIRONOLACTONE 25 MILLIGRAM(S): 25 TABLET, FILM COATED ORAL at 05:20

## 2020-08-05 RX ADMIN — HEPARIN SODIUM 5000 UNIT(S): 5000 INJECTION INTRAVENOUS; SUBCUTANEOUS at 22:15

## 2020-08-05 RX ADMIN — PRASUGREL 10 MILLIGRAM(S): 5 TABLET, FILM COATED ORAL at 12:47

## 2020-08-05 RX ADMIN — MORPHINE SULFATE 4 MILLIGRAM(S): 50 CAPSULE, EXTENDED RELEASE ORAL at 01:44

## 2020-08-05 RX ADMIN — OXYCODONE HYDROCHLORIDE 10 MILLIGRAM(S): 5 TABLET ORAL at 05:19

## 2020-08-05 RX ADMIN — HEPARIN SODIUM 5000 UNIT(S): 5000 INJECTION INTRAVENOUS; SUBCUTANEOUS at 05:19

## 2020-08-05 RX ADMIN — OXYCODONE HYDROCHLORIDE 10 MILLIGRAM(S): 5 TABLET ORAL at 06:14

## 2020-08-05 RX ADMIN — Medication 975 MILLIGRAM(S): at 06:14

## 2020-08-05 RX ADMIN — OXYCODONE HYDROCHLORIDE 10 MILLIGRAM(S): 5 TABLET ORAL at 19:21

## 2020-08-05 RX ADMIN — Medication 2: at 12:45

## 2020-08-05 RX ADMIN — Medication 1 APPLICATION(S): at 03:19

## 2020-08-05 RX ADMIN — Medication 8 UNIT(S): at 18:00

## 2020-08-05 RX ADMIN — Medication 6: at 22:12

## 2020-08-05 RX ADMIN — GABAPENTIN 100 MILLIGRAM(S): 400 CAPSULE ORAL at 22:11

## 2020-08-05 RX ADMIN — GABAPENTIN 100 MILLIGRAM(S): 400 CAPSULE ORAL at 05:20

## 2020-08-05 RX ADMIN — MORPHINE SULFATE 4 MILLIGRAM(S): 50 CAPSULE, EXTENDED RELEASE ORAL at 02:00

## 2020-08-05 RX ADMIN — OXYCODONE HYDROCHLORIDE 10 MILLIGRAM(S): 5 TABLET ORAL at 18:01

## 2020-08-05 RX ADMIN — Medication 81 MILLIGRAM(S): at 05:20

## 2020-08-05 RX ADMIN — Medication 975 MILLIGRAM(S): at 22:15

## 2020-08-05 RX ADMIN — Medication 8 UNIT(S): at 08:55

## 2020-08-05 RX ADMIN — ATORVASTATIN CALCIUM 40 MILLIGRAM(S): 80 TABLET, FILM COATED ORAL at 22:12

## 2020-08-05 RX ADMIN — PANTOPRAZOLE SODIUM 40 MILLIGRAM(S): 20 TABLET, DELAYED RELEASE ORAL at 07:33

## 2020-08-05 RX ADMIN — Medication 975 MILLIGRAM(S): at 15:00

## 2020-08-05 RX ADMIN — INSULIN GLARGINE 16 UNIT(S): 100 INJECTION, SOLUTION SUBCUTANEOUS at 22:15

## 2020-08-05 RX ADMIN — MORPHINE SULFATE 4 MILLIGRAM(S): 50 CAPSULE, EXTENDED RELEASE ORAL at 22:30

## 2020-08-05 NOTE — PROGRESS NOTE ADULT - SUBJECTIVE AND OBJECTIVE BOX
Ortho Note    Pt comfortable without complaints, pain controlled  Denies CP, SOB, N/V, numbness/tingling     Vital Signs Last 24 Hrs  T(C): 36.9 (08-05-20 @ 09:13), Max: 36.9 (08-05-20 @ 09:13)  T(F): 98.5 (08-05-20 @ 09:13), Max: 98.5 (08-05-20 @ 09:13)  HR: 71 (08-05-20 @ 09:13) (71 - 71)  BP: 93/54 (08-05-20 @ 09:13) (93/54 - 93/54)  BP(mean): --  RR: 18 (08-05-20 @ 09:13) (18 - 18)  SpO2: 95% (08-05-20 @ 09:13) (95% - 95%)  AVSS    General: Pt Alert and oriented, NAD  DSG- staples intact to R knee,silvadene, 4x4 and kerlix wrap in place; wound vac to LLE secured by ace  Pulses: feet cool to touch, at baseline- follows with Dr. Malloy (vascular) outpatient  Sensation: SILT to baseline BLE  Motor: 5/5 EHL/FHL/TA/GS                        7.8    8.42  )-----------( 516      ( 05 Aug 2020 07:21 )             26.3   05 Aug 2020 07:21    134    |  99     |  21     ----------------------------<  184    4.7     |  27     |  0.87     Ca    8.4        05 Aug 2020 07:21  Mg     1.8       05 Aug 2020 07:21    TPro  6.3    /  Alb  2.6    /  TBili  0.4    /  DBili  x      /  AST  13     /  ALT  10     /  AlkPhos  153    04 Aug 2020 07:41      A/P: 65M s/p right knee arthroscopic I+D 7/17, right knee explant and abx spacer 7/22, now with debridement of LLE wound with wound vac placement on 7/30.  - H+H stable, VSS- continue to monitor labs  - ESR 63 (downtrend), CRP 5.07 (mild uptrend)- continue to monitor q2days, next draw for 8/6  - Pain Control- mildly orthostatic after dilaudid PO dose x1 yesterday; pain better controlled; pre-medicate for PT with oxycodone today  - DVT ppx: effient/ HSQ/ ASA  - PT, WBS: TTWB RLE  - OOB to chair as tolerated, aggressive I/S  - appreciate ID recs- continue vancomycin and rifmapin, follow-up trophs prior to every 4th dose; last troph last night therapeutic, next vanc troph due prior to 2pm dose 8/6  - appreciate internal medicine recs  - wound vac changes to LLE vascular wound tomorrow by Plastics; then will be 1x/ week for 4 weeks on Fridays outpatient by homecare  - dispo: recommended for PINKY, but refusing; continue to progress with PT; plan for eventual home with [facility] and wheelchair, infusion services, and wound vac dressing changes    Ortho Pager [phone] normal...

## 2020-08-05 NOTE — CHART NOTE - NSCHARTNOTEFT_GEN_A_CORE
Admitting Diagnosis:   Patient is a 65y old  Male who presents with a chief complaint of septic knee (04 Aug 2020 16:53)    PAST MEDICAL & SURGICAL HISTORY:  Diabetic neuropathy  STEMI (ST elevation myocardial infarction)  Diverticulitis  MRSA bacteremia  History of celiac disease  CHF (congestive heart failure): EF ~ 25%  HTN (hypertension)  Diabetes: on insulin pump  Blood clot due to device, implant, or graft: was on blood thinners  HLD (hyperlipidemia)  Osteoarthritis  Atherosclerosis of coronary artery: CAD (coronary artery disease)  Status post percutaneous transluminal coronary angioplasty: in 2012  History of open reduction and internal fixation (ORIF) procedure  Surgery, elective: Right shoulder  Surgery, elective: right knee wound debridement  S/P TKR (total knee replacement), right: with infection Mrsa   per pt he was cleared from MRSA infection  S/P CABG x 1: 2018  Stented coronary artery: 10/18 heart attack  INFERIOR WALL MI  Other postprocedural status: Fixation hardware in foot LEFT    Current Nutrition Order: CST CHO diet with Glucerna Shakes BID (440 kcal, 20g protein, 400mL H2O)      PO Intake: Good (%) [ x  ]  Fair (50-75%) [ x  ] Poor (<25%) [   ]- Pt continues to consume >50% of meals.     GI Issues:   WDL, last BM 8/4  No n/v/d/c noted  No chewing/ swallowing difficulties noted    Pain:  8/10 right knee/ right leg pain noted- well managed with pain regime    Skin Integrity:  Surgical incision, arelis score 19  DM ulcers: Left shin, Left second toe, Right foot  No edema present  No pressure ulcers noted      Labs:   08-05    134<L>  |  99  |  21  ----------------------------<  184<H>  4.7   |  27  |  0.87    Ca    8.4      05 Aug 2020 07:21  Mg     1.8     08-05    TPro  6.3  /  Alb  2.6<L>  /  TBili  0.4  /  DBili  x   /  AST  13  /  ALT  10  /  AlkPhos  153<H>  08-04    CAPILLARY BLOOD GLUCOSE    POCT Blood Glucose.: 200 mg/dL (05 Aug 2020 08:53)  POCT Blood Glucose.: 203 mg/dL (05 Aug 2020 07:29)  POCT Blood Glucose.: 313 mg/dL (04 Aug 2020 22:52)  POCT Blood Glucose.: 205 mg/dL (04 Aug 2020 16:56)  POCT Blood Glucose.: 133 mg/dL (04 Aug 2020 12:03)    Medications:  MEDICATIONS  (STANDING):  acetaminophen   Tablet .. 975 milliGRAM(s) Oral every 8 hours  aspirin enteric coated 81 milliGRAM(s) Oral two times a day  atorvastatin 40 milliGRAM(s) Oral at bedtime  chlorhexidine 2% Cloths 1 Application(s) Topical <User Schedule>  dextrose 5%. 1000 milliLiter(s) (50 mL/Hr) IV Continuous <Continuous>  dextrose 50% Injectable 12.5 Gram(s) IV Push once  dextrose 50% Injectable 25 Gram(s) IV Push once  dextrose 50% Injectable 25 Gram(s) IV Push once  digoxin     Tablet 0.125 milliGRAM(s) Oral daily  DULoxetine 90 milliGRAM(s) Oral daily  gabapentin 100 milliGRAM(s) Oral three times a day  heparin   Injectable 5000 Unit(s) SubCutaneous every 8 hours  insulin glargine Injectable (LANTUS) 16 Unit(s) SubCutaneous at bedtime  insulin lispro (HumaLOG) corrective regimen sliding scale   SubCutaneous Before meals and at bedtime  insulin lispro Injectable (HumaLOG) 8 Unit(s) SubCutaneous three times a day before meals  metoprolol succinate ER 25 milliGRAM(s) Oral daily  oxyCODONE  ER Tablet 10 milliGRAM(s) Oral every 12 hours  pantoprazole    Tablet 40 milliGRAM(s) Oral before breakfast  polyethylene glycol 3350 17 Gram(s) Oral daily  prasugrel 10 milliGRAM(s) Oral daily  rifAMPin 300 milliGRAM(s) Oral every 12 hours  silver sulfADIAZINE 1% Cream 1 Application(s) Topical two times a day  spironolactone 25 milliGRAM(s) Oral daily  tamsulosin 0.4 milliGRAM(s) Oral at bedtime  vancomycin  IVPB 750 milliGRAM(s) IV Intermittent every 12 hours    MEDICATIONS  (PRN):  aluminum hydroxide/magnesium hydroxide/simethicone Suspension 30 milliLiter(s) Oral four times a day PRN Indigestion  bisacodyl Suppository 10 milliGRAM(s) Rectal daily PRN If no bowel movement by POD#2  cyclobenzaprine 10 milliGRAM(s) Oral three times a day PRN Muscle Spasm  dextrose 40% Gel 15 Gram(s) Oral once PRN Blood Glucose LESS THAN 70 milliGRAM(s)/deciliter  glucagon  Injectable 1 milliGRAM(s) IntraMuscular once PRN Glucose LESS THAN 70 milligrams/deciliter  HYDROmorphone  Injectable 1 milliGRAM(s) IV Push every 4 hours PRN Breakthrough pain  magnesium hydroxide Suspension 30 milliLiter(s) Oral daily PRN Constipation  morphine  - Injectable 4 milliGRAM(s) IV Push every 3 hours PRN Severe Pain (7 - 10)  ondansetron Injectable 4 milliGRAM(s) IV Push every 6 hours PRN Nausea and/or Vomiting  oxyCODONE    IR 10 milliGRAM(s) Oral every 3 hours PRN Moderate Pain (4 - 6)  oxyCODONE    IR 5 milliGRAM(s) Oral every 3 hours PRN Mild Pain (1 - 3)  senna 2 Tablet(s) Oral at bedtime PRN Constipation  sodium chloride 0.9% lock flush 10 milliLiter(s) IV Push every 1 hour PRN Pre/post blood products, medications, blood draw, and to maintain line patency    Admitted Anthropometrics:  Height: 74" Weight: 183lbs, IBW 190lbs+/-10%, %IBW 96%, BMI 23.5 kg/m2    Weight:  7/16 183lbs    Weight Change: UBW consistent with admission weight. No new weights obtained this admission. Please trend weights biweekly.     Nutrition Focused Physical Exam: Completed [   ]  Unable to complete [   ]- Unremarkable.     Estimated energy needs:    ABW (83kg) used for calculations as pt between % of IBW. Needs adjusted for wound healing s/p I and D and infected knee.   Kcal (25-30 kcal/kg): 4800-6163 kcal  Protein (1.2-1.4 g/kg pro): 100-116 g pro  Fluids (30-35 ml/kg): 8521-8494 ml    Subjective:   66 yo M with HTN, hyperlipidemia, type II DM with peripheral neuropathy, CAD s/p MIDCAB (2018)/VERONICA, HFrEF, AICD (2/20), pulmonary HTN with recurrent R knee PPJI, likely admitting with septic knee now s/p right knee arthroscopic I&D 7/17 with follow up I&D with antibiotic spacer 7/22. Pt remains on abx course for culture positive for MRSA with negative blood cultures (rifampin and vanco). S/p PICC placement 7/27. Plan for d/c likely later this week per disc with team during rounds, pending PT progress. Plan was for PINKY but pt now refusing- CM to follow. No other changes noted since previous assessment 8/3. On assessment, pt resting in bed without complaints. Currently on CST CHO diet with Glucerna Shakes BID (440 kcal, 20g protein, 400mL H2O), cont to tolerate >50% of meals without complaints. No n/v/d/c noted. Cont to encourage adequate PO intake to help build strength, pt receptive. No further education on CST CHO diet provided as pt declined/ has been educated on diet numerous times this admit. Per PT, pt cont to progress slowly. Pain is well controlled. Please see nutrition recs below. RD to follow up per protocol.     Nutrition Diagnosis: Increased kcal, pro needs RT increased nutrient demand 2/2 wound healing AEB s/p  right knee arthroscopic I&D    [  ] No active nutrition diagnosis at this time  [  ] Current medical condition precludes nutrition intervention    Goal: Consistently meet >75% est needs    Recommendations:  1. Cont with CST CHO diet with evening snack  2. Cont with Glucerna Shakes BID (440 kcal, 20g protein, 400mL H2O)   3. Cont with wound care per team  4. Cont with adequate pain and bowel regime per team  5. RD diet education reenforcement prn  6. Monitor lytes and replete prn    Education: Reenforced adequate PO intake to aid with wound healing and strength building- pt appears receptive.     Risk Level: High [   ] Moderate [ x  ] Low [   ].

## 2020-08-05 NOTE — PROGRESS NOTE ADULT - SUBJECTIVE AND OBJECTIVE BOX
Ortho Progress Note    Pt's pain controlled.  No acute events overnight, pain controlled. Denies CP, SOB, N/V, numbness/tingling.    Vital Signs Last 24 Hrs  T(C): 36.8 (05 Aug 2020 04:29), Max: 37.1 (04 Aug 2020 09:22)  T(F): 98.2 (05 Aug 2020 04:29), Max: 98.8 (04 Aug 2020 09:22)  HR: 90 (05 Aug 2020 04:29) (80 - 90)  BP: 112/69 (05 Aug 2020 04:29) (93/46 - 120/70)  BP(mean): --  RR: 18 (05 Aug 2020 04:29) (17 - 18)  SpO2: 94% (05 Aug 2020 04:29) (94% - 98%)      Physical Exam:  General: Resting comfortably in bed. AAOx3. NAD.  Extremities:        LLE: Wound VAC in place holding suction, management by plastic surgery team. SILT L2-S1 distribution except decreased sensation feet, baseline neuropathy, symmetric. TA/EHL/FHL/GS motor intact. Feet cool b/l       RLE: Gauze/Silvedine dressing C/D/I. SILT L2-S1 distribution except decreased feet (baseline), symmetric. TA/EHL/FHL/GS motor intact. Feet cool b/l, cap refill <3 sec                  Labs:                                                         8.0    8.50  )-----------( 494      ( 04 Aug 2020 07:41 )             26.8                A/P: 65yMale s/p R total knee explant, antibiotic spacer 7/22  - Stable  - Pain Control  - DVT ppx: ASA/Effient  - Appreciate ID recs: on Vanco, rifampin for 6 weeks from date of last procedure  - Continue to trend WBC/ESR/CRP  - Silvedene for RLE wound twice daily  - Waiting for clear demarcation of R knee wound before attempting repeat I&D  - F/u blood cultures  - PT, WBS: TTWB RLE    Ortho Pager [phone]

## 2020-08-06 LAB
ALBUMIN SERPL ELPH-MCNC: 2.6 G/DL — LOW (ref 3.3–5)
ALP SERPL-CCNC: 211 U/L — HIGH (ref 40–120)
ALT FLD-CCNC: 14 U/L — SIGNIFICANT CHANGE UP (ref 10–45)
ANION GAP SERPL CALC-SCNC: 8 MMOL/L — SIGNIFICANT CHANGE UP (ref 5–17)
AST SERPL-CCNC: 28 U/L — SIGNIFICANT CHANGE UP (ref 10–40)
BILIRUB SERPL-MCNC: 0.6 MG/DL — SIGNIFICANT CHANGE UP (ref 0.2–1.2)
BUN SERPL-MCNC: 18 MG/DL — SIGNIFICANT CHANGE UP (ref 7–23)
CALCIUM SERPL-MCNC: 8.6 MG/DL — SIGNIFICANT CHANGE UP (ref 8.4–10.5)
CHLORIDE SERPL-SCNC: 99 MMOL/L — SIGNIFICANT CHANGE UP (ref 96–108)
CO2 SERPL-SCNC: 27 MMOL/L — SIGNIFICANT CHANGE UP (ref 22–31)
CREAT SERPL-MCNC: 0.85 MG/DL — SIGNIFICANT CHANGE UP (ref 0.5–1.3)
CRP SERPL-MCNC: 4.16 MG/DL — HIGH (ref 0–0.4)
ERYTHROCYTE [SEDIMENTATION RATE] IN BLOOD: 80 MM/HR — HIGH
GLUCOSE BLDC GLUCOMTR-MCNC: 148 MG/DL — HIGH (ref 70–99)
GLUCOSE BLDC GLUCOMTR-MCNC: 163 MG/DL — HIGH (ref 70–99)
GLUCOSE BLDC GLUCOMTR-MCNC: 186 MG/DL — HIGH (ref 70–99)
GLUCOSE BLDC GLUCOMTR-MCNC: 231 MG/DL — HIGH (ref 70–99)
GLUCOSE SERPL-MCNC: 164 MG/DL — HIGH (ref 70–99)
HCT VFR BLD CALC: 27.4 % — LOW (ref 39–50)
HGB BLD-MCNC: 8 G/DL — LOW (ref 13–17)
MAGNESIUM SERPL-MCNC: 1.9 MG/DL — SIGNIFICANT CHANGE UP (ref 1.6–2.6)
MCHC RBC-ENTMCNC: 22.9 PG — LOW (ref 27–34)
MCHC RBC-ENTMCNC: 29.2 GM/DL — LOW (ref 32–36)
MCV RBC AUTO: 78.3 FL — LOW (ref 80–100)
NRBC # BLD: 0 /100 WBCS — SIGNIFICANT CHANGE UP (ref 0–0)
PLATELET # BLD AUTO: 519 K/UL — HIGH (ref 150–400)
POTASSIUM SERPL-MCNC: 5.2 MMOL/L — SIGNIFICANT CHANGE UP (ref 3.5–5.3)
POTASSIUM SERPL-SCNC: 5.2 MMOL/L — SIGNIFICANT CHANGE UP (ref 3.5–5.3)
PROT SERPL-MCNC: 6.9 G/DL — SIGNIFICANT CHANGE UP (ref 6–8.3)
RBC # BLD: 3.5 M/UL — LOW (ref 4.2–5.8)
RBC # FLD: 18.6 % — HIGH (ref 10.3–14.5)
SODIUM SERPL-SCNC: 134 MMOL/L — LOW (ref 135–145)
VANCOMYCIN TROUGH SERPL-MCNC: 14.9 UG/ML — SIGNIFICANT CHANGE UP (ref 10–20)
WBC # BLD: 8.77 K/UL — SIGNIFICANT CHANGE UP (ref 3.8–10.5)
WBC # FLD AUTO: 8.77 K/UL — SIGNIFICANT CHANGE UP (ref 3.8–10.5)

## 2020-08-06 RX ADMIN — SPIRONOLACTONE 25 MILLIGRAM(S): 25 TABLET, FILM COATED ORAL at 06:21

## 2020-08-06 RX ADMIN — OXYCODONE HYDROCHLORIDE 10 MILLIGRAM(S): 5 TABLET ORAL at 02:00

## 2020-08-06 RX ADMIN — HEPARIN SODIUM 5000 UNIT(S): 5000 INJECTION INTRAVENOUS; SUBCUTANEOUS at 13:57

## 2020-08-06 RX ADMIN — GABAPENTIN 100 MILLIGRAM(S): 400 CAPSULE ORAL at 06:21

## 2020-08-06 RX ADMIN — Medication 4: at 17:54

## 2020-08-06 RX ADMIN — Medication 2: at 08:02

## 2020-08-06 RX ADMIN — ATORVASTATIN CALCIUM 40 MILLIGRAM(S): 80 TABLET, FILM COATED ORAL at 22:10

## 2020-08-06 RX ADMIN — OXYCODONE HYDROCHLORIDE 10 MILLIGRAM(S): 5 TABLET ORAL at 19:27

## 2020-08-06 RX ADMIN — Medication 975 MILLIGRAM(S): at 23:00

## 2020-08-06 RX ADMIN — Medication 975 MILLIGRAM(S): at 14:31

## 2020-08-06 RX ADMIN — OXYCODONE HYDROCHLORIDE 10 MILLIGRAM(S): 5 TABLET ORAL at 06:20

## 2020-08-06 RX ADMIN — Medication 81 MILLIGRAM(S): at 17:54

## 2020-08-06 RX ADMIN — OXYCODONE HYDROCHLORIDE 10 MILLIGRAM(S): 5 TABLET ORAL at 17:54

## 2020-08-06 RX ADMIN — OXYCODONE HYDROCHLORIDE 10 MILLIGRAM(S): 5 TABLET ORAL at 14:45

## 2020-08-06 RX ADMIN — Medication 8 UNIT(S): at 12:27

## 2020-08-06 RX ADMIN — OXYCODONE HYDROCHLORIDE 10 MILLIGRAM(S): 5 TABLET ORAL at 03:00

## 2020-08-06 RX ADMIN — OXYCODONE HYDROCHLORIDE 10 MILLIGRAM(S): 5 TABLET ORAL at 14:05

## 2020-08-06 RX ADMIN — MORPHINE SULFATE 4 MILLIGRAM(S): 50 CAPSULE, EXTENDED RELEASE ORAL at 12:27

## 2020-08-06 RX ADMIN — Medication 2: at 22:11

## 2020-08-06 RX ADMIN — Medication 1 APPLICATION(S): at 11:29

## 2020-08-06 RX ADMIN — HEPARIN SODIUM 5000 UNIT(S): 5000 INJECTION INTRAVENOUS; SUBCUTANEOUS at 22:10

## 2020-08-06 RX ADMIN — CHLORHEXIDINE GLUCONATE 1 APPLICATION(S): 213 SOLUTION TOPICAL at 10:01

## 2020-08-06 RX ADMIN — HEPARIN SODIUM 5000 UNIT(S): 5000 INJECTION INTRAVENOUS; SUBCUTANEOUS at 06:20

## 2020-08-06 RX ADMIN — GABAPENTIN 100 MILLIGRAM(S): 400 CAPSULE ORAL at 22:10

## 2020-08-06 RX ADMIN — GABAPENTIN 100 MILLIGRAM(S): 400 CAPSULE ORAL at 13:57

## 2020-08-06 RX ADMIN — Medication 25 MILLIGRAM(S): at 06:20

## 2020-08-06 RX ADMIN — Medication 250 MILLIGRAM(S): at 14:40

## 2020-08-06 RX ADMIN — Medication 975 MILLIGRAM(S): at 07:00

## 2020-08-06 RX ADMIN — DULOXETINE HYDROCHLORIDE 90 MILLIGRAM(S): 30 CAPSULE, DELAYED RELEASE ORAL at 12:27

## 2020-08-06 RX ADMIN — Medication 8 UNIT(S): at 17:54

## 2020-08-06 RX ADMIN — Medication 975 MILLIGRAM(S): at 06:20

## 2020-08-06 RX ADMIN — OXYCODONE HYDROCHLORIDE 10 MILLIGRAM(S): 5 TABLET ORAL at 18:35

## 2020-08-06 RX ADMIN — Medication 8 UNIT(S): at 08:01

## 2020-08-06 RX ADMIN — PRASUGREL 10 MILLIGRAM(S): 5 TABLET, FILM COATED ORAL at 12:27

## 2020-08-06 RX ADMIN — INSULIN GLARGINE 16 UNIT(S): 100 INJECTION, SOLUTION SUBCUTANEOUS at 22:10

## 2020-08-06 RX ADMIN — MORPHINE SULFATE 4 MILLIGRAM(S): 50 CAPSULE, EXTENDED RELEASE ORAL at 12:45

## 2020-08-06 RX ADMIN — Medication 81 MILLIGRAM(S): at 06:20

## 2020-08-06 RX ADMIN — Medication 975 MILLIGRAM(S): at 22:10

## 2020-08-06 RX ADMIN — OXYCODONE HYDROCHLORIDE 10 MILLIGRAM(S): 5 TABLET ORAL at 07:00

## 2020-08-06 RX ADMIN — Medication 975 MILLIGRAM(S): at 13:57

## 2020-08-06 RX ADMIN — PANTOPRAZOLE SODIUM 40 MILLIGRAM(S): 20 TABLET, DELAYED RELEASE ORAL at 06:20

## 2020-08-06 RX ADMIN — Medication 250 MILLIGRAM(S): at 01:50

## 2020-08-06 NOTE — PROGRESS NOTE ADULT - ATTENDING COMMENTS
He had an episode earlier today where he found himself unable to transfer with a walker and needed to wait for nursing assistance. He's starting to feel like he doesn't have a chance of going home and is distraught at the thought of going back to rehab. He is terrified of falling again. Pain is still getting better. Vac change with Plastics was uneventful.    Right knee exam stable from yesterday    We discussed discharge dispo. He now wants to reconsider rehab. Will reopen that conversation with case management tomorrow morning. Will also call for a counselor to discuss his depression and hopefully alleviate some of the catastrophic thinking he's been having lately.

## 2020-08-06 NOTE — PROGRESS NOTE ADULT - SUBJECTIVE AND OBJECTIVE BOX
Ortho Progress Note    Pt's pain controlled.  No acute events overnight, pain controlled. Denies CP, SOB, N/V, numbness/tingling.    Vital Signs Last 24 Hrs  T(C): 36.5 (06 Aug 2020 06:10), Max: 37.2 (05 Aug 2020 22:15)  T(F): 97.7 (06 Aug 2020 06:10), Max: 98.9 (05 Aug 2020 22:15)  HR: 87 (06 Aug 2020 06:10) (71 - 94)  BP: 129/74 (06 Aug 2020 06:10) (93/54 - 129/74)  BP(mean): --  RR: 12 (06 Aug 2020 06:10) (12 - 18)  SpO2: 96% (06 Aug 2020 06:10) (94% - 100%)    Physical Exam:  General: Resting comfortably in bed. AAOx3. NAD.  Extremities:        LLE: Wound VAC in place holding suction, management by plastic surgery team. SILT L2-S1 distribution except decreased sensation feet, baseline neuropathy, symmetric. TA/EHL/FHL/GS motor intact. Feet cool b/l       RLE: Gauze/Silvedine dressing C/D/I. SILT L2-S1 distribution except decreased feet (baseline), symmetric. TA/EHL/FHL/GS motor intact. Feet cool b/l, cap refill <3 sec                  Labs:                                                         7.8    8.42  )-----------( 516      ( 05 Aug 2020 07:21 )             26.3       A/P: 65yMale s/p R total knee explant, antibiotic spacer 7/22  - Stable  - Pain Control  - DVT ppx: ASA/Effient  - Appreciate ID recs: on Vanco, rifampin for 6 weeks from date of last procedure  - Continue to trend WBC/ESR/CRP  - Silvedene for RLE wound twice daily  - Waiting for clear demarcation of R knee wound before attempting repeat I&D  - F/u blood cultures  - PT, WBS: TTWB RLE    Ortho Pager [phone]

## 2020-08-06 NOTE — PROGRESS NOTE ADULT - SUBJECTIVE AND OBJECTIVE BOX
SUBJECTIVE: This morning, he feels well; his pain is well-controlled. No nausea or vomiting. No acute complaints.    Vital Signs  T(C): 36.5 HR: 87 BP: 129/74 RR: 12 SpO2: 96%     OUT:    VAC (Vacuum Assisted Closure) System: 50 mL      General: NAD, resting comfortably in bed  C/V: NSR  Pulm: Nonlabored breathing, no respiratory distress  Extrem: WWP, no edema, vac in place with good seal; dressing changed, integra in place, no erythema or induration, no fluctuance, no foul smell        LABS:  pending

## 2020-08-06 NOTE — PROGRESS NOTE ADULT - SUBJECTIVE AND OBJECTIVE BOX
Ortho Note    Pt comfortable without complaints, pain controlled  Denies CP, SOB, N/V, numbness/tingling     Vital Signs Last 24 Hrs  T(C): 36.7 (08-06-20 @ 09:28), Max: 36.7 (08-06-20 @ 09:28)  T(F): 98 (08-06-20 @ 09:28), Max: 98 (08-06-20 @ 09:28)  HR: 75 (08-06-20 @ 09:28) (75 - 75)  BP: 101/62 (08-06-20 @ 09:28) (101/62 - 101/62)  BP(mean): --  RR: 14 (08-06-20 @ 09:28) (14 - 14)  SpO2: 97% (08-06-20 @ 09:28) (97% - 97%)  AVSS    General: Pt Alert and oriented, NAD  DSG- staples intact to R knee,silvadene, medipore in place  Pulses: feet cool to touch, at baseline- follows with Dr. Malloy (vascular) outpatient  Sensation: SILT to baseline BLE  Motor: 5/5 EHL/FHL/TA/GS                                     8.0    8.77  )-----------( 519      ( 06 Aug 2020 07:55 )             27.4   06 Aug 2020 07:55    134    |  99     |  18     ----------------------------<  164    5.2     |  27     |  0.85     Ca    8.6        06 Aug 2020 07:55  Mg     1.9       06 Aug 2020 07:55    TPro  6.9    /  Alb  2.6    /  TBili  0.6    /  DBili  x      /  AST  28     /  ALT  14     /  AlkPhos  211    06 Aug 2020 07:55      A/P: 65M s/p right knee arthroscopic I+D 7/17, right knee explant and abx spacer 7/22, now with debridement of LLE wound with wound vac placement on 7/30.  - H+H stable, VSS- continue to monitor labs  - ESR 80 (mild uptrend)), CRP 4.16 (downtred)- continue to monitor q2days while inpatient  - Pain Control- pain improving, tolerating PT better  - DVT ppx: effient/ HSQ/ ASA  - PT, WBS: TTWB RLE  - OOB to chair as tolerated, aggressive I/S  - appreciate ID recs- continue vancomycin and rifmapin, follow-up trophs prior to every 4th dose  - appreciate internal medicine recs  - wound vac changes to LLE vascular wound tomorrow by Plastics; then will be 1x/ week for 4 weeks on Fridays outpatient by homecare  - dispo: recommended for [facility], but refusing; continue to progress with PT; plan for eventual home with [facility] and wheelchair, infusion services, and wound vac dressing changes    Ortho Pager [phone]

## 2020-08-07 LAB
ANION GAP SERPL CALC-SCNC: 10 MMOL/L — SIGNIFICANT CHANGE UP (ref 5–17)
BUN SERPL-MCNC: 19 MG/DL — SIGNIFICANT CHANGE UP (ref 7–23)
CALCIUM SERPL-MCNC: 8.3 MG/DL — LOW (ref 8.4–10.5)
CHLORIDE SERPL-SCNC: 99 MMOL/L — SIGNIFICANT CHANGE UP (ref 96–108)
CO2 SERPL-SCNC: 25 MMOL/L — SIGNIFICANT CHANGE UP (ref 22–31)
CREAT SERPL-MCNC: 0.94 MG/DL — SIGNIFICANT CHANGE UP (ref 0.5–1.3)
GLUCOSE BLDC GLUCOMTR-MCNC: 141 MG/DL — HIGH (ref 70–99)
GLUCOSE BLDC GLUCOMTR-MCNC: 161 MG/DL — HIGH (ref 70–99)
GLUCOSE BLDC GLUCOMTR-MCNC: 174 MG/DL — HIGH (ref 70–99)
GLUCOSE BLDC GLUCOMTR-MCNC: 197 MG/DL — HIGH (ref 70–99)
GLUCOSE BLDC GLUCOMTR-MCNC: 208 MG/DL — HIGH (ref 70–99)
GLUCOSE SERPL-MCNC: 187 MG/DL — HIGH (ref 70–99)
HCT VFR BLD CALC: 26.9 % — LOW (ref 39–50)
HGB BLD-MCNC: 7.8 G/DL — LOW (ref 13–17)
MCHC RBC-ENTMCNC: 22.3 PG — LOW (ref 27–34)
MCHC RBC-ENTMCNC: 29 GM/DL — LOW (ref 32–36)
MCV RBC AUTO: 77.1 FL — LOW (ref 80–100)
NRBC # BLD: 0 /100 WBCS — SIGNIFICANT CHANGE UP (ref 0–0)
PLATELET # BLD AUTO: 553 K/UL — HIGH (ref 150–400)
POTASSIUM SERPL-MCNC: 4.6 MMOL/L — SIGNIFICANT CHANGE UP (ref 3.5–5.3)
POTASSIUM SERPL-SCNC: 4.6 MMOL/L — SIGNIFICANT CHANGE UP (ref 3.5–5.3)
RBC # BLD: 3.49 M/UL — LOW (ref 4.2–5.8)
RBC # FLD: 18.8 % — HIGH (ref 10.3–14.5)
SODIUM SERPL-SCNC: 134 MMOL/L — LOW (ref 135–145)
WBC # BLD: 8.21 K/UL — SIGNIFICANT CHANGE UP (ref 3.8–10.5)
WBC # FLD AUTO: 8.21 K/UL — SIGNIFICANT CHANGE UP (ref 3.8–10.5)

## 2020-08-07 PROCEDURE — 99232 SBSQ HOSP IP/OBS MODERATE 35: CPT

## 2020-08-07 PROCEDURE — 99232 SBSQ HOSP IP/OBS MODERATE 35: CPT | Mod: GC

## 2020-08-07 RX ORDER — OXYCODONE HYDROCHLORIDE 5 MG/1
10 TABLET ORAL EVERY 12 HOURS
Refills: 0 | Status: DISCONTINUED | OUTPATIENT
Start: 2020-08-08 | End: 2020-08-10

## 2020-08-07 RX ADMIN — MORPHINE SULFATE 4 MILLIGRAM(S): 50 CAPSULE, EXTENDED RELEASE ORAL at 23:04

## 2020-08-07 RX ADMIN — OXYCODONE HYDROCHLORIDE 10 MILLIGRAM(S): 5 TABLET ORAL at 17:52

## 2020-08-07 RX ADMIN — Medication 1 APPLICATION(S): at 10:14

## 2020-08-07 RX ADMIN — Medication 8 UNIT(S): at 10:13

## 2020-08-07 RX ADMIN — MORPHINE SULFATE 4 MILLIGRAM(S): 50 CAPSULE, EXTENDED RELEASE ORAL at 23:30

## 2020-08-07 RX ADMIN — Medication 1 APPLICATION(S): at 21:50

## 2020-08-07 RX ADMIN — OXYCODONE HYDROCHLORIDE 10 MILLIGRAM(S): 5 TABLET ORAL at 18:51

## 2020-08-07 RX ADMIN — Medication 8 UNIT(S): at 17:53

## 2020-08-07 RX ADMIN — Medication 2: at 10:12

## 2020-08-07 RX ADMIN — MORPHINE SULFATE 4 MILLIGRAM(S): 50 CAPSULE, EXTENDED RELEASE ORAL at 01:26

## 2020-08-07 RX ADMIN — Medication 975 MILLIGRAM(S): at 13:39

## 2020-08-07 RX ADMIN — INSULIN GLARGINE 16 UNIT(S): 100 INJECTION, SOLUTION SUBCUTANEOUS at 21:38

## 2020-08-07 RX ADMIN — CHLORHEXIDINE GLUCONATE 1 APPLICATION(S): 213 SOLUTION TOPICAL at 06:51

## 2020-08-07 RX ADMIN — SPIRONOLACTONE 25 MILLIGRAM(S): 25 TABLET, FILM COATED ORAL at 06:50

## 2020-08-07 RX ADMIN — HEPARIN SODIUM 5000 UNIT(S): 5000 INJECTION INTRAVENOUS; SUBCUTANEOUS at 13:39

## 2020-08-07 RX ADMIN — Medication 4: at 17:53

## 2020-08-07 RX ADMIN — OXYCODONE HYDROCHLORIDE 10 MILLIGRAM(S): 5 TABLET ORAL at 06:02

## 2020-08-07 RX ADMIN — Medication 250 MILLIGRAM(S): at 13:38

## 2020-08-07 RX ADMIN — Medication 81 MILLIGRAM(S): at 17:53

## 2020-08-07 RX ADMIN — Medication 81 MILLIGRAM(S): at 06:02

## 2020-08-07 RX ADMIN — PANTOPRAZOLE SODIUM 40 MILLIGRAM(S): 20 TABLET, DELAYED RELEASE ORAL at 06:02

## 2020-08-07 RX ADMIN — MORPHINE SULFATE 4 MILLIGRAM(S): 50 CAPSULE, EXTENDED RELEASE ORAL at 01:35

## 2020-08-07 RX ADMIN — Medication 975 MILLIGRAM(S): at 21:38

## 2020-08-07 RX ADMIN — GABAPENTIN 100 MILLIGRAM(S): 400 CAPSULE ORAL at 06:01

## 2020-08-07 RX ADMIN — Medication 250 MILLIGRAM(S): at 03:17

## 2020-08-07 RX ADMIN — HEPARIN SODIUM 5000 UNIT(S): 5000 INJECTION INTRAVENOUS; SUBCUTANEOUS at 21:38

## 2020-08-07 RX ADMIN — Medication 975 MILLIGRAM(S): at 07:04

## 2020-08-07 RX ADMIN — GABAPENTIN 100 MILLIGRAM(S): 400 CAPSULE ORAL at 21:38

## 2020-08-07 RX ADMIN — ATORVASTATIN CALCIUM 40 MILLIGRAM(S): 80 TABLET, FILM COATED ORAL at 21:39

## 2020-08-07 RX ADMIN — Medication 0.12 MILLIGRAM(S): at 06:02

## 2020-08-07 RX ADMIN — Medication 2: at 21:37

## 2020-08-07 RX ADMIN — Medication 975 MILLIGRAM(S): at 06:02

## 2020-08-07 RX ADMIN — Medication 975 MILLIGRAM(S): at 22:15

## 2020-08-07 RX ADMIN — OXYCODONE HYDROCHLORIDE 10 MILLIGRAM(S): 5 TABLET ORAL at 17:29

## 2020-08-07 RX ADMIN — Medication 8 UNIT(S): at 13:38

## 2020-08-07 RX ADMIN — OXYCODONE HYDROCHLORIDE 10 MILLIGRAM(S): 5 TABLET ORAL at 16:48

## 2020-08-07 RX ADMIN — HEPARIN SODIUM 5000 UNIT(S): 5000 INJECTION INTRAVENOUS; SUBCUTANEOUS at 06:02

## 2020-08-07 RX ADMIN — Medication 975 MILLIGRAM(S): at 14:15

## 2020-08-07 RX ADMIN — PRASUGREL 10 MILLIGRAM(S): 5 TABLET, FILM COATED ORAL at 13:43

## 2020-08-07 RX ADMIN — GABAPENTIN 100 MILLIGRAM(S): 400 CAPSULE ORAL at 13:44

## 2020-08-07 RX ADMIN — TAMSULOSIN HYDROCHLORIDE 0.4 MILLIGRAM(S): 0.4 CAPSULE ORAL at 21:39

## 2020-08-07 RX ADMIN — Medication 25 MILLIGRAM(S): at 06:01

## 2020-08-07 RX ADMIN — OXYCODONE HYDROCHLORIDE 10 MILLIGRAM(S): 5 TABLET ORAL at 07:04

## 2020-08-07 RX ADMIN — DULOXETINE HYDROCHLORIDE 90 MILLIGRAM(S): 30 CAPSULE, DELAYED RELEASE ORAL at 13:38

## 2020-08-07 NOTE — PROGRESS NOTE ADULT - SUBJECTIVE AND OBJECTIVE BOX
INTERVAL HISTORY:  Not able to ambulate. No chest pain or dizziness.	  Chart, PMH medications and allergies reviewed    MEDICATIONS:  MEDICATIONS  (STANDING):  acetaminophen   Tablet .. 975 milliGRAM(s) Oral every 8 hours  aspirin enteric coated 81 milliGRAM(s) Oral two times a day  atorvastatin 40 milliGRAM(s) Oral at bedtime  chlorhexidine 2% Cloths 1 Application(s) Topical <User Schedule>  dextrose 5%. 1000 milliLiter(s) (50 mL/Hr) IV Continuous <Continuous>  dextrose 50% Injectable 12.5 Gram(s) IV Push once  dextrose 50% Injectable 25 Gram(s) IV Push once  dextrose 50% Injectable 25 Gram(s) IV Push once  digoxin     Tablet 0.125 milliGRAM(s) Oral daily  DULoxetine 90 milliGRAM(s) Oral daily  gabapentin 100 milliGRAM(s) Oral three times a day  heparin   Injectable 5000 Unit(s) SubCutaneous every 8 hours  insulin glargine Injectable (LANTUS) 16 Unit(s) SubCutaneous at bedtime  insulin lispro (HumaLOG) corrective regimen sliding scale   SubCutaneous Before meals and at bedtime  insulin lispro Injectable (HumaLOG) 8 Unit(s) SubCutaneous three times a day before meals  metoprolol succinate ER 25 milliGRAM(s) Oral daily  oxyCODONE  ER Tablet 10 milliGRAM(s) Oral every 12 hours  pantoprazole    Tablet 40 milliGRAM(s) Oral before breakfast  polyethylene glycol 3350 17 Gram(s) Oral daily  prasugrel 10 milliGRAM(s) Oral daily  rifAMPin 300 milliGRAM(s) Oral every 12 hours  silver sulfADIAZINE 1% Cream 1 Application(s) Topical two times a day  spironolactone 25 milliGRAM(s) Oral daily  tamsulosin 0.4 milliGRAM(s) Oral at bedtime  vancomycin  IVPB 750 milliGRAM(s) IV Intermittent every 12 hours      REVIEW OF SYSTEMS:  CONSTITUTIONAL: No fever, no weight loss, no fatigue  PULMONARY: No cough, no wheezing, chills no hemoptysis; No Shortness of Breath  CARDIOVASCULAR: No chest pain, no palpitations, no syncope, dizziness, no leg swelling  GASTROINTESTINAL: No abdominal pain. No nausea, no  vomiting, no hematemesis; No diarrhea, no constipation. No melena, no hematochezia.  GENITOURINARY: No dysuria, no frequency, no hematuria, no incontinence  SKIN: No itching, no burning, no rashes, no lesions     PHYSICAL EXAM:  T(C): 36.8 (08-07-20 @ 05:27), Max: 36.9 (08-06-20 @ 14:00)  HR: 90 (08-07-20 @ 05:27) (75 - 91)  BP: 112/70 (08-07-20 @ 05:27) (101/62 - 114/67)  RR: 18 (08-07-20 @ 05:27) (14 - 18)  SpO2: 97% (08-07-20 @ 05:27) (95% - 98%)  Wt(kg): --  I&O's Summary    06 Aug 2020 07:01  -  07 Aug 2020 07:00  --------------------------------------------------------  IN: 1570 mL / OUT: 1400 mL / NET: 170 mL        Appearance:  No deformities, normal appearance	  HEENT:   Normal oral mucosa, PERRL, EOMI	  Neck: No jvd , no masses  Lymphatic: No  lymphadenopathy  Pulmonary: Lungs clear to auscultation and percussion  Cardiovascular: Normal S1 S2, No JVD, No murmur, No edema	  Abdomen:  Soft, Non-tender, + BS  Skin: No rashes, No ecchymoses	  Extremities:  No clubbing or cyanosis  MSK: knee bandaged    LABS:		  CARDIAC MARKERS:                         8.0    8.77  )-----------( 519      ( 06 Aug 2020 07:55 )             27.4   08-06    134<L>  |  99  |  18  ----------------------------<  164<H>  5.2   |  27  |  0.85    Ca    8.6      06 Aug 2020 07:55  Mg     1.9     08-06    TPro  6.9  /  Alb  2.6<L>  /  TBili  0.6  /  DBili  x   /  AST  28  /  ALT  14  /  AlkPhos  211<H>  08-06    proBNP:  Lipid Profile:  HgA1c:  TSH:      TELEMETRY: 	      QTC     ECG:  	   QTC         RADIOLOGY:   DIAGNOSTIC TESTING: [ ] Echocardiogram: [ ]  Catheterization: [ ] Stress Test:      ASSESSMENT/PLAN: 	  MRSA sepsis/septic knee- patient is at risk for infected leads.  Defibrillator pocket looks good. Normal WBC and afebrile. Crp is down. Encouraged patient to go to [facility] but home is likely.    Orthostatic dizziness/Severely reduced ejection fraction- No dizziness recently. He is sedentary however denies dyspnea. Chest x-ray is clear. Rx;d with  metoprolol 25mg daily and digoxin. Started on spironolactone 25 daily.  Probably euvolemic.     Coronary artery disease-the patient denies chest discomfort. His EKG is uninterpretable secondary to the pacemaker.  There is no clinical evidence for ischemia. Rx;d with aspirin and atorvastatin.  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     Defibrillator-off monitor, continue metoprolol.

## 2020-08-07 NOTE — PROGRESS NOTE ADULT - SUBJECTIVE AND OBJECTIVE BOX
INTERVAL HPI/OVERNIGHT EVENTS:  Interim reviewed; Doing well;  For discharge today on IV Vancomycin ; Has PICC      MEDICATIONS  (STANDING):  acetaminophen   Tablet .. 975 milliGRAM(s) Oral every 8 hours  aspirin enteric coated 81 milliGRAM(s) Oral two times a day  atorvastatin 40 milliGRAM(s) Oral at bedtime  chlorhexidine 2% Cloths 1 Application(s) Topical <User Schedule>  dextrose 5%. 1000 milliLiter(s) (50 mL/Hr) IV Continuous <Continuous>  dextrose 50% Injectable 12.5 Gram(s) IV Push once  dextrose 50% Injectable 25 Gram(s) IV Push once  dextrose 50% Injectable 25 Gram(s) IV Push once  digoxin     Tablet 0.125 milliGRAM(s) Oral daily  DULoxetine 90 milliGRAM(s) Oral daily  gabapentin 100 milliGRAM(s) Oral three times a day  heparin   Injectable 5000 Unit(s) SubCutaneous every 8 hours  insulin glargine Injectable (LANTUS) 16 Unit(s) SubCutaneous at bedtime  insulin lispro (HumaLOG) corrective regimen sliding scale   SubCutaneous Before meals and at bedtime  insulin lispro Injectable (HumaLOG) 8 Unit(s) SubCutaneous three times a day before meals  metoprolol succinate ER 25 milliGRAM(s) Oral daily  oxyCODONE  ER Tablet 10 milliGRAM(s) Oral every 12 hours  pantoprazole    Tablet 40 milliGRAM(s) Oral before breakfast  polyethylene glycol 3350 17 Gram(s) Oral daily  prasugrel 10 milliGRAM(s) Oral daily  rifAMPin 300 milliGRAM(s) Oral every 12 hours  silver sulfADIAZINE 1% Cream 1 Application(s) Topical two times a day  spironolactone 25 milliGRAM(s) Oral daily  tamsulosin 0.4 milliGRAM(s) Oral at bedtime  vancomycin  IVPB 750 milliGRAM(s) IV Intermittent every 12 hours    MEDICATIONS  (PRN):  aluminum hydroxide/magnesium hydroxide/simethicone Suspension 30 milliLiter(s) Oral four times a day PRN Indigestion  bisacodyl Suppository 10 milliGRAM(s) Rectal daily PRN If no bowel movement by POD#2  cyclobenzaprine 10 milliGRAM(s) Oral three times a day PRN Muscle Spasm  dextrose 40% Gel 15 Gram(s) Oral once PRN Blood Glucose LESS THAN 70 milliGRAM(s)/deciliter  glucagon  Injectable 1 milliGRAM(s) IntraMuscular once PRN Glucose LESS THAN 70 milligrams/deciliter  HYDROmorphone  Injectable 1 milliGRAM(s) IV Push every 4 hours PRN Breakthrough pain  magnesium hydroxide Suspension 30 milliLiter(s) Oral daily PRN Constipation  morphine  - Injectable 4 milliGRAM(s) IV Push every 3 hours PRN Severe Pain (7 - 10)  ondansetron Injectable 4 milliGRAM(s) IV Push every 6 hours PRN Nausea and/or Vomiting  oxyCODONE    IR 10 milliGRAM(s) Oral every 3 hours PRN Moderate Pain (4 - 6)  oxyCODONE    IR 5 milliGRAM(s) Oral every 3 hours PRN Mild Pain (1 - 3)  senna 2 Tablet(s) Oral at bedtime PRN Constipation  sodium chloride 0.9% lock flush 10 milliLiter(s) IV Push every 1 hour PRN Pre/post blood products, medications, blood draw, and to maintain line patency      Allergies    ACE inhibitors (Hives)  carvedilol (Other)  enalapril (Hives)  Entresto (Other)    Intolerances        Vital Signs Last 24 Hrs  T(C): 36.8 (07 Aug 2020 09:02), Max: 36.9 (06 Aug 2020 14:00)  T(F): 98.2 (07 Aug 2020 09:02), Max: 98.4 (06 Aug 2020 14:00)  HR: 82 (07 Aug 2020 09:02) (82 - 91)  BP: 104/62 (07 Aug 2020 09:02) (103/61 - 114/67)  BP(mean): 3 (07 Aug 2020 01:27) (3 - 3)  RR: 16 (07 Aug 2020 09:02) (15 - 18)  SpO2: 99% (07 Aug 2020 09:02) (95% - 99%)          Constitutional: Awake    Eyes: DAMARIS    ENMT: Negative    Neck: Supple    Back:  no tenderness     Respiratory:  clear    Cardiovascular: S1 S2    Gastrointestinal: soft    Genitourinary:    Extremities:  no edema    Vascular:    Neurological:    Skin:    Lymph Nodes:            08-06 @ 07:01  -  08-07 @ 07:00  --------------------------------------------------------  IN: 1570 mL / OUT: 1400 mL / NET: 170 mL      LABS:                        7.8    8.21  )-----------( 553      ( 07 Aug 2020 08:03 )             26.9     08-07    134<L>  |  99  |  19  ----------------------------<  187<H>  4.6   |  25  |  0.94    Ca    8.3<L>      07 Aug 2020 08:03  Mg     1.9     08-06    TPro  6.9  /  Alb  2.6<L>  /  TBili  0.6  /  DBili  x   /  AST  28  /  ALT  14  /  AlkPhos  211<H>  08-06          RADIOLOGY & ADDITIONAL TESTS:

## 2020-08-07 NOTE — PROGRESS NOTE ADULT - SUBJECTIVE AND OBJECTIVE BOX
Ortho Progress Note    Pt's pain controlled.  No acute events overnight, pain controlled. Denies CP, SOB, N/V, numbness/tingling.    Vital Signs Last 24 Hrs  T(C): 36.8 (07 Aug 2020 05:27), Max: 36.9 (06 Aug 2020 14:00)  T(F): 98.2 (07 Aug 2020 05:27), Max: 98.4 (06 Aug 2020 14:00)  HR: 90 (07 Aug 2020 05:27) (75 - 91)  BP: 112/70 (07 Aug 2020 05:27) (101/62 - 114/67)  BP(mean): 3 (07 Aug 2020 01:27) (3 - 3)  RR: 18 (07 Aug 2020 05:27) (14 - 18)  SpO2: 97% (07 Aug 2020 05:27) (95% - 98%)    Physical Exam:  General: Resting comfortably in bed. AAOx3. NAD.  Extremities:        LLE: Wound VAC in place holding suction, management by plastic surgery team. SILT L2-S1 distribution except decreased sensation feet, baseline neuropathy, symmetric. TA/EHL/FHL/GS motor intact. Feet cool b/l       RLE: Gauze/Silvedine dressing C/D/I. SILT L2-S1 distribution except decreased feet (baseline), symmetric. TA/EHL/FHL/GS motor intact. Feet cool b/l, cap refill <3 sec                  Labs:                                                                    8.0    8.77  )-----------( 519      ( 06 Aug 2020 07:55 )             27.4         A/P: 65yMale s/p R total knee explant, antibiotic spacer 7/22  - Stable  - Pain Control  - DVT ppx: ASA/Effient  - Appreciate ID recs: on Vanco, for 6 weeks from date of last procedure  - Continue to trend WBC/ESR/CRP  - Silvedene for RLE wound twice daily  - Waiting for clear demarcation of R knee wound before attempting repeat I&D  - F/u blood cultures  - PT, WBS: TTWB RLE  - Dispo planning: [facility] vs. home pending discussion with CM today    Ortho Pager [phone]

## 2020-08-08 LAB
ALBUMIN SERPL ELPH-MCNC: 2.5 G/DL — LOW (ref 3.3–5)
ALP SERPL-CCNC: 222 U/L — HIGH (ref 40–120)
ALT FLD-CCNC: 14 U/L — SIGNIFICANT CHANGE UP (ref 10–45)
ANION GAP SERPL CALC-SCNC: 9 MMOL/L — SIGNIFICANT CHANGE UP (ref 5–17)
AST SERPL-CCNC: 19 U/L — SIGNIFICANT CHANGE UP (ref 10–40)
BILIRUB SERPL-MCNC: 0.5 MG/DL — SIGNIFICANT CHANGE UP (ref 0.2–1.2)
BUN SERPL-MCNC: 23 MG/DL — SIGNIFICANT CHANGE UP (ref 7–23)
CALCIUM SERPL-MCNC: 8.8 MG/DL — SIGNIFICANT CHANGE UP (ref 8.4–10.5)
CHLORIDE SERPL-SCNC: 98 MMOL/L — SIGNIFICANT CHANGE UP (ref 96–108)
CO2 SERPL-SCNC: 24 MMOL/L — SIGNIFICANT CHANGE UP (ref 22–31)
CREAT SERPL-MCNC: 0.88 MG/DL — SIGNIFICANT CHANGE UP (ref 0.5–1.3)
CRP SERPL-MCNC: 5.03 MG/DL — HIGH (ref 0–0.4)
ERYTHROCYTE [SEDIMENTATION RATE] IN BLOOD: 60 MM/HR — HIGH
GLUCOSE BLDC GLUCOMTR-MCNC: 136 MG/DL — HIGH (ref 70–99)
GLUCOSE BLDC GLUCOMTR-MCNC: 177 MG/DL — HIGH (ref 70–99)
GLUCOSE BLDC GLUCOMTR-MCNC: 216 MG/DL — HIGH (ref 70–99)
GLUCOSE BLDC GLUCOMTR-MCNC: 91 MG/DL — SIGNIFICANT CHANGE UP (ref 70–99)
GLUCOSE SERPL-MCNC: 189 MG/DL — HIGH (ref 70–99)
HCT VFR BLD CALC: 30 % — LOW (ref 39–50)
HGB BLD-MCNC: 8.3 G/DL — LOW (ref 13–17)
MCHC RBC-ENTMCNC: 22.9 PG — LOW (ref 27–34)
MCHC RBC-ENTMCNC: 27.7 GM/DL — LOW (ref 32–36)
MCV RBC AUTO: 82.6 FL — SIGNIFICANT CHANGE UP (ref 80–100)
NRBC # BLD: 0 /100 WBCS — SIGNIFICANT CHANGE UP (ref 0–0)
PLATELET # BLD AUTO: 408 K/UL — HIGH (ref 150–400)
POTASSIUM SERPL-MCNC: 5.1 MMOL/L — SIGNIFICANT CHANGE UP (ref 3.5–5.3)
POTASSIUM SERPL-SCNC: 5.1 MMOL/L — SIGNIFICANT CHANGE UP (ref 3.5–5.3)
PROT SERPL-MCNC: 7.2 G/DL — SIGNIFICANT CHANGE UP (ref 6–8.3)
RBC # BLD: 3.63 M/UL — LOW (ref 4.2–5.8)
RBC # FLD: 19 % — HIGH (ref 10.3–14.5)
SODIUM SERPL-SCNC: 131 MMOL/L — LOW (ref 135–145)
VANCOMYCIN TROUGH SERPL-MCNC: 16.2 UG/ML — SIGNIFICANT CHANGE UP (ref 10–20)
WBC # BLD: 7.5 K/UL — SIGNIFICANT CHANGE UP (ref 3.8–10.5)
WBC # FLD AUTO: 7.5 K/UL — SIGNIFICANT CHANGE UP (ref 3.8–10.5)

## 2020-08-08 PROCEDURE — 99232 SBSQ HOSP IP/OBS MODERATE 35: CPT | Mod: GC

## 2020-08-08 RX ORDER — OXYCODONE HYDROCHLORIDE 5 MG/1
5 TABLET ORAL EVERY 4 HOURS
Refills: 0 | Status: DISCONTINUED | OUTPATIENT
Start: 2020-08-08 | End: 2020-08-10

## 2020-08-08 RX ORDER — MORPHINE SULFATE 50 MG/1
2 CAPSULE, EXTENDED RELEASE ORAL EVERY 4 HOURS
Refills: 0 | Status: DISCONTINUED | OUTPATIENT
Start: 2020-08-08 | End: 2020-08-10

## 2020-08-08 RX ORDER — OXYCODONE HYDROCHLORIDE 5 MG/1
10 TABLET ORAL EVERY 4 HOURS
Refills: 0 | Status: DISCONTINUED | OUTPATIENT
Start: 2020-08-08 | End: 2020-08-10

## 2020-08-08 RX ORDER — HYDROMORPHONE HYDROCHLORIDE 2 MG/ML
0.5 INJECTION INTRAMUSCULAR; INTRAVENOUS; SUBCUTANEOUS EVERY 4 HOURS
Refills: 0 | Status: DISCONTINUED | OUTPATIENT
Start: 2020-08-08 | End: 2020-08-08

## 2020-08-08 RX ADMIN — Medication 975 MILLIGRAM(S): at 15:00

## 2020-08-08 RX ADMIN — MORPHINE SULFATE 2 MILLIGRAM(S): 50 CAPSULE, EXTENDED RELEASE ORAL at 21:14

## 2020-08-08 RX ADMIN — GABAPENTIN 100 MILLIGRAM(S): 400 CAPSULE ORAL at 21:14

## 2020-08-08 RX ADMIN — Medication 4: at 18:25

## 2020-08-08 RX ADMIN — ATORVASTATIN CALCIUM 40 MILLIGRAM(S): 80 TABLET, FILM COATED ORAL at 21:14

## 2020-08-08 RX ADMIN — Medication 975 MILLIGRAM(S): at 21:13

## 2020-08-08 RX ADMIN — Medication 975 MILLIGRAM(S): at 14:02

## 2020-08-08 RX ADMIN — Medication 2: at 09:46

## 2020-08-08 RX ADMIN — Medication 975 MILLIGRAM(S): at 05:41

## 2020-08-08 RX ADMIN — Medication 8 UNIT(S): at 09:46

## 2020-08-08 RX ADMIN — HEPARIN SODIUM 5000 UNIT(S): 5000 INJECTION INTRAVENOUS; SUBCUTANEOUS at 05:40

## 2020-08-08 RX ADMIN — Medication 8 UNIT(S): at 14:01

## 2020-08-08 RX ADMIN — OXYCODONE HYDROCHLORIDE 10 MILLIGRAM(S): 5 TABLET ORAL at 18:25

## 2020-08-08 RX ADMIN — DULOXETINE HYDROCHLORIDE 90 MILLIGRAM(S): 30 CAPSULE, DELAYED RELEASE ORAL at 14:03

## 2020-08-08 RX ADMIN — MORPHINE SULFATE 2 MILLIGRAM(S): 50 CAPSULE, EXTENDED RELEASE ORAL at 21:48

## 2020-08-08 RX ADMIN — CYCLOBENZAPRINE HYDROCHLORIDE 10 MILLIGRAM(S): 10 TABLET, FILM COATED ORAL at 09:13

## 2020-08-08 RX ADMIN — Medication 8 UNIT(S): at 18:24

## 2020-08-08 RX ADMIN — OXYCODONE HYDROCHLORIDE 10 MILLIGRAM(S): 5 TABLET ORAL at 06:30

## 2020-08-08 RX ADMIN — HEPARIN SODIUM 5000 UNIT(S): 5000 INJECTION INTRAVENOUS; SUBCUTANEOUS at 14:13

## 2020-08-08 RX ADMIN — Medication 975 MILLIGRAM(S): at 22:58

## 2020-08-08 RX ADMIN — Medication 250 MILLIGRAM(S): at 14:01

## 2020-08-08 RX ADMIN — CHLORHEXIDINE GLUCONATE 1 APPLICATION(S): 213 SOLUTION TOPICAL at 05:41

## 2020-08-08 RX ADMIN — GABAPENTIN 100 MILLIGRAM(S): 400 CAPSULE ORAL at 14:02

## 2020-08-08 RX ADMIN — MORPHINE SULFATE 2 MILLIGRAM(S): 50 CAPSULE, EXTENDED RELEASE ORAL at 15:33

## 2020-08-08 RX ADMIN — Medication 975 MILLIGRAM(S): at 06:30

## 2020-08-08 RX ADMIN — OXYCODONE HYDROCHLORIDE 10 MILLIGRAM(S): 5 TABLET ORAL at 05:41

## 2020-08-08 RX ADMIN — Medication 81 MILLIGRAM(S): at 18:25

## 2020-08-08 RX ADMIN — Medication 250 MILLIGRAM(S): at 02:23

## 2020-08-08 RX ADMIN — PANTOPRAZOLE SODIUM 40 MILLIGRAM(S): 20 TABLET, DELAYED RELEASE ORAL at 06:08

## 2020-08-08 RX ADMIN — Medication 1 APPLICATION(S): at 21:16

## 2020-08-08 RX ADMIN — Medication 81 MILLIGRAM(S): at 05:41

## 2020-08-08 RX ADMIN — PRASUGREL 10 MILLIGRAM(S): 5 TABLET, FILM COATED ORAL at 14:02

## 2020-08-08 RX ADMIN — HEPARIN SODIUM 5000 UNIT(S): 5000 INJECTION INTRAVENOUS; SUBCUTANEOUS at 21:14

## 2020-08-08 RX ADMIN — SPIRONOLACTONE 25 MILLIGRAM(S): 25 TABLET, FILM COATED ORAL at 05:41

## 2020-08-08 RX ADMIN — Medication 1 APPLICATION(S): at 09:13

## 2020-08-08 RX ADMIN — GABAPENTIN 100 MILLIGRAM(S): 400 CAPSULE ORAL at 05:41

## 2020-08-08 RX ADMIN — MORPHINE SULFATE 2 MILLIGRAM(S): 50 CAPSULE, EXTENDED RELEASE ORAL at 16:00

## 2020-08-08 RX ADMIN — Medication 25 MILLIGRAM(S): at 05:40

## 2020-08-08 RX ADMIN — OXYCODONE HYDROCHLORIDE 10 MILLIGRAM(S): 5 TABLET ORAL at 19:00

## 2020-08-08 NOTE — PROGRESS NOTE ADULT - ATTENDING COMMENTS
He's had some conflicting ideas for the past couple of days but after discussion with his wife last night he decided that he does want to go home. He is medically stable to go. Will reinstate all home care plans including home infusion and VAC changes. Possibly for discharge home today if all arrangements can be confirmed. To follow up with myself, Lalo ANAYA. Still thinking home tomorrow. Medically/surgically stable.

## 2020-08-08 NOTE — PROGRESS NOTE ADULT - SUBJECTIVE AND OBJECTIVE BOX
Ortho Note    Pt seen and examined at bedside this AM, reports minimal R knee pain, R knee warm but some swelling, no erythema   Denies CP, SOB, N/V, numbness/tingling     Vital Signs Last 24 Hrs  T(C): 36.7 (08-08-20 @ 05:36), Max: 36.7 (08-08-20 @ 05:36)  T(F): 98 (08-08-20 @ 05:36), Max: 98 (08-08-20 @ 05:36)  HR: 87 (08-08-20 @ 05:36) (87 - 87)  BP: 123/76 (08-08-20 @ 05:36) (123/76 - 123/76)  BP(mean): --  RR: 18 (08-08-20 @ 05:36) (18 - 18)  SpO2: 100% (08-08-20 @ 05:36) (100% - 100%)    General: Pt Alert and oriented, NAD  R knee warm, some swelling, no erythema or ecchymosis; medipore DSG C/D/I  Sensation intact s/s/sp/dp/t  Motor: EHL/FHL/TA/GS 5/5  2+ DP pulse  L leg ace and vac in place holding good suction   Sensation intact s/s/sp/dp/t  Motor: EHL/FHL/TA/GS 5/5  2+ DP pulse  Toes WWP                          8.3    7.50  )-----------( 408      ( 08 Aug 2020 07:49 )             30.0   08 Aug 2020 07:49    131    |  98     |  23     ----------------------------<  189    5.1     |  24     |  0.88     Ca    8.8        08 Aug 2020 07:49    TPro  7.2    /  Alb  2.5    /  TBili  0.5    /  DBili  x      /  AST  19     /  ALT  14     /  AlkPhos  222    08 Aug 2020 07:49      A/P: 65M s/p R knee explant and abx spacer placement 7/22 and L leg integra/wound vac placement 7/30   - Stable  - Pain Control  - DVT ppx: ASA>Effient   - PT, WBS: TTWB RLE  - Appreciate ID recs - cont vanc for 6 weeks  - Continue silvedene to RLE  - Dispo home vs rehab     Ortho Pager [phone] Ortho Note    Pt seen and examined at bedside this AM, reports minimal R knee pain.  Denies CP, SOB, N/V, numbness/tingling     Vital Signs Last 24 Hrs  T(C): 36.7 (08-08-20 @ 05:36), Max: 36.7 (08-08-20 @ 05:36)  T(F): 98 (08-08-20 @ 05:36), Max: 98 (08-08-20 @ 05:36)  HR: 87 (08-08-20 @ 05:36) (87 - 87)  BP: 123/76 (08-08-20 @ 05:36) (123/76 - 123/76)  BP(mean): --  RR: 18 (08-08-20 @ 05:36) (18 - 18)  SpO2: 100% (08-08-20 @ 05:36) (100% - 100%)    General: Pt Alert and oriented, NAD  R knee warm, some swelling, no erythema or ecchymosis; medipore DSG C/D/I  Sensation intact s/s/sp/dp/t  Motor: EHL/FHL/TA/GS 5/5  2+ DP pulse  L leg ace and vac in place holding good suction   Sensation intact s/s/sp/dp/t  Motor: EHL/FHL/TA/GS 5/5  2+ DP pulse  Toes WWP                          8.3    7.50  )-----------( 408      ( 08 Aug 2020 07:49 )             30.0   08 Aug 2020 07:49    131    |  98     |  23     ----------------------------<  189    5.1     |  24     |  0.88     Ca    8.8        08 Aug 2020 07:49    TPro  7.2    /  Alb  2.5    /  TBili  0.5    /  DBili  x      /  AST  19     /  ALT  14     /  AlkPhos  222    08 Aug 2020 07:49      A/P: 65M s/p R knee explant and abx spacer placement 7/22 and L leg integra/wound vac placement 7/30   - Stable  - Pain Control  - DVT ppx: ASA>Effient   - PT, WBS: TTWB RLE  - Appreciate ID recs - cont vanc for 6 weeks  - Continue silvedene to RLE  - Dispo home vs rehab     Ortho Pager [phone]

## 2020-08-09 LAB
ANION GAP SERPL CALC-SCNC: 11 MMOL/L — SIGNIFICANT CHANGE UP (ref 5–17)
BUN SERPL-MCNC: 25 MG/DL — HIGH (ref 7–23)
CALCIUM SERPL-MCNC: 8.4 MG/DL — SIGNIFICANT CHANGE UP (ref 8.4–10.5)
CHLORIDE SERPL-SCNC: 100 MMOL/L — SIGNIFICANT CHANGE UP (ref 96–108)
CO2 SERPL-SCNC: 22 MMOL/L — SIGNIFICANT CHANGE UP (ref 22–31)
CREAT SERPL-MCNC: 0.86 MG/DL — SIGNIFICANT CHANGE UP (ref 0.5–1.3)
GLUCOSE BLDC GLUCOMTR-MCNC: 172 MG/DL — HIGH (ref 70–99)
GLUCOSE BLDC GLUCOMTR-MCNC: 182 MG/DL — HIGH (ref 70–99)
GLUCOSE BLDC GLUCOMTR-MCNC: 206 MG/DL — HIGH (ref 70–99)
GLUCOSE BLDC GLUCOMTR-MCNC: 216 MG/DL — HIGH (ref 70–99)
GLUCOSE SERPL-MCNC: 185 MG/DL — HIGH (ref 70–99)
HCT VFR BLD CALC: 28.1 % — LOW (ref 39–50)
HGB BLD-MCNC: 8.3 G/DL — LOW (ref 13–17)
MCHC RBC-ENTMCNC: 22.7 PG — LOW (ref 27–34)
MCHC RBC-ENTMCNC: 29.5 GM/DL — LOW (ref 32–36)
MCV RBC AUTO: 77 FL — LOW (ref 80–100)
NRBC # BLD: 0 /100 WBCS — SIGNIFICANT CHANGE UP (ref 0–0)
PLATELET # BLD AUTO: 536 K/UL — HIGH (ref 150–400)
POTASSIUM SERPL-MCNC: 4.6 MMOL/L — SIGNIFICANT CHANGE UP (ref 3.5–5.3)
POTASSIUM SERPL-SCNC: 4.6 MMOL/L — SIGNIFICANT CHANGE UP (ref 3.5–5.3)
RBC # BLD: 3.65 M/UL — LOW (ref 4.2–5.8)
RBC # FLD: 18.5 % — HIGH (ref 10.3–14.5)
SODIUM SERPL-SCNC: 133 MMOL/L — LOW (ref 135–145)
WBC # BLD: 8.59 K/UL — SIGNIFICANT CHANGE UP (ref 3.8–10.5)
WBC # FLD AUTO: 8.59 K/UL — SIGNIFICANT CHANGE UP (ref 3.8–10.5)

## 2020-08-09 PROCEDURE — 99232 SBSQ HOSP IP/OBS MODERATE 35: CPT | Mod: GC

## 2020-08-09 RX ADMIN — MORPHINE SULFATE 2 MILLIGRAM(S): 50 CAPSULE, EXTENDED RELEASE ORAL at 02:15

## 2020-08-09 RX ADMIN — OXYCODONE HYDROCHLORIDE 10 MILLIGRAM(S): 5 TABLET ORAL at 18:05

## 2020-08-09 RX ADMIN — GABAPENTIN 100 MILLIGRAM(S): 400 CAPSULE ORAL at 13:25

## 2020-08-09 RX ADMIN — HEPARIN SODIUM 5000 UNIT(S): 5000 INJECTION INTRAVENOUS; SUBCUTANEOUS at 05:43

## 2020-08-09 RX ADMIN — Medication 4: at 17:59

## 2020-08-09 RX ADMIN — Medication 2: at 21:36

## 2020-08-09 RX ADMIN — Medication 250 MILLIGRAM(S): at 02:08

## 2020-08-09 RX ADMIN — Medication 1 APPLICATION(S): at 21:31

## 2020-08-09 RX ADMIN — Medication 2: at 09:59

## 2020-08-09 RX ADMIN — Medication 250 MILLIGRAM(S): at 17:18

## 2020-08-09 RX ADMIN — Medication 975 MILLIGRAM(S): at 21:30

## 2020-08-09 RX ADMIN — Medication 975 MILLIGRAM(S): at 05:38

## 2020-08-09 RX ADMIN — HEPARIN SODIUM 5000 UNIT(S): 5000 INJECTION INTRAVENOUS; SUBCUTANEOUS at 21:30

## 2020-08-09 RX ADMIN — MORPHINE SULFATE 2 MILLIGRAM(S): 50 CAPSULE, EXTENDED RELEASE ORAL at 21:45

## 2020-08-09 RX ADMIN — Medication 975 MILLIGRAM(S): at 06:32

## 2020-08-09 RX ADMIN — PANTOPRAZOLE SODIUM 40 MILLIGRAM(S): 20 TABLET, DELAYED RELEASE ORAL at 06:32

## 2020-08-09 RX ADMIN — MORPHINE SULFATE 2 MILLIGRAM(S): 50 CAPSULE, EXTENDED RELEASE ORAL at 21:37

## 2020-08-09 RX ADMIN — Medication 975 MILLIGRAM(S): at 14:00

## 2020-08-09 RX ADMIN — MORPHINE SULFATE 2 MILLIGRAM(S): 50 CAPSULE, EXTENDED RELEASE ORAL at 02:08

## 2020-08-09 RX ADMIN — Medication 25 MILLIGRAM(S): at 10:06

## 2020-08-09 RX ADMIN — Medication 8 UNIT(S): at 17:59

## 2020-08-09 RX ADMIN — ATORVASTATIN CALCIUM 40 MILLIGRAM(S): 80 TABLET, FILM COATED ORAL at 21:30

## 2020-08-09 RX ADMIN — Medication 81 MILLIGRAM(S): at 17:18

## 2020-08-09 RX ADMIN — Medication 975 MILLIGRAM(S): at 21:45

## 2020-08-09 RX ADMIN — SPIRONOLACTONE 25 MILLIGRAM(S): 25 TABLET, FILM COATED ORAL at 13:24

## 2020-08-09 RX ADMIN — OXYCODONE HYDROCHLORIDE 10 MILLIGRAM(S): 5 TABLET ORAL at 17:18

## 2020-08-09 RX ADMIN — DULOXETINE HYDROCHLORIDE 90 MILLIGRAM(S): 30 CAPSULE, DELAYED RELEASE ORAL at 13:25

## 2020-08-09 RX ADMIN — Medication 975 MILLIGRAM(S): at 13:25

## 2020-08-09 RX ADMIN — Medication 8 UNIT(S): at 09:59

## 2020-08-09 RX ADMIN — HEPARIN SODIUM 5000 UNIT(S): 5000 INJECTION INTRAVENOUS; SUBCUTANEOUS at 13:25

## 2020-08-09 RX ADMIN — PRASUGREL 10 MILLIGRAM(S): 5 TABLET, FILM COATED ORAL at 13:25

## 2020-08-09 RX ADMIN — Medication 81 MILLIGRAM(S): at 05:38

## 2020-08-09 RX ADMIN — Medication 8 UNIT(S): at 13:24

## 2020-08-09 RX ADMIN — INSULIN GLARGINE 16 UNIT(S): 100 INJECTION, SOLUTION SUBCUTANEOUS at 21:31

## 2020-08-09 RX ADMIN — GABAPENTIN 100 MILLIGRAM(S): 400 CAPSULE ORAL at 05:38

## 2020-08-09 RX ADMIN — OXYCODONE HYDROCHLORIDE 10 MILLIGRAM(S): 5 TABLET ORAL at 05:39

## 2020-08-09 RX ADMIN — GABAPENTIN 100 MILLIGRAM(S): 400 CAPSULE ORAL at 21:30

## 2020-08-09 RX ADMIN — Medication 1 APPLICATION(S): at 13:25

## 2020-08-09 RX ADMIN — CHLORHEXIDINE GLUCONATE 1 APPLICATION(S): 213 SOLUTION TOPICAL at 05:43

## 2020-08-09 RX ADMIN — Medication 4: at 13:24

## 2020-08-09 RX ADMIN — OXYCODONE HYDROCHLORIDE 10 MILLIGRAM(S): 5 TABLET ORAL at 06:32

## 2020-08-09 NOTE — PROGRESS NOTE ADULT - SUBJECTIVE AND OBJECTIVE BOX
Ortho Note    Pt seen and examined at bedside, comfortable without complaints, pain controlled  Denies CP, SOB, N/V, numbness/tingling     Vital Signs Last 24 Hrs  T(C): 36.7 (08-09-20 @ 05:45), Max: 36.7 (08-09-20 @ 05:45)  T(F): 98 (08-09-20 @ 05:45), Max: 98 (08-09-20 @ 05:45)  HR: 90 (08-09-20 @ 05:45) (86 - 90)  BP: 93/65 (08-09-20 @ 05:45) (93/65 - 113/72)  BP(mean): 75 (08-09-20 @ 05:45) (75 - 75)  RR: 18 (08-09-20 @ 05:45) (18 - 18)  SpO2: 95% (08-09-20 @ 05:45) (95% - 98%)  AVSS    General: Pt Alert and oriented, NAD  R knee minimal swelling, no erythema or ecchymosis; telfa DSG C/D/I  Sensation intact s/s/sp/dp/t  Motor: EHL/FHL/TA/GS 5/5  2+ DP pulse  L leg ace and vac in place holding good suction   Sensation intact s/s/sp/dp/t  Motor: EHL/FHL/TA/GS 5/5  2+ DP pulse  Toes WWP                          8.3    7.50  )-----------( 408      ( 08 Aug 2020 07:49 )             30.0   08 Aug 2020 07:49    131    |  98     |  23     ----------------------------<  189    5.1     |  24     |  0.88       TPro  7.2    /  Alb  2.5    /  TBili  0.5    /  DBili  x      /  AST  19     /  ALT  14     /  AlkPhos  222    08 Aug 2020 07:49      A/P: 65M s/p R knee explant and abx spacer placement 7/22 and L leg integra/wound vac placement 7/30   - Stable  - Pain Control  - DVT ppx: ASA/Effient   - PT, WBS: TTWB RLE  - Appreciate ID recs - continue vanc for 6 weeks  - Continue silvedene to RLE  - Dispo home when home care in place possibly Monday     Ortho Pager [phone]

## 2020-08-10 ENCOUNTER — APPOINTMENT (OUTPATIENT)
Dept: CARDIOLOGY | Facility: CLINIC | Age: 65
End: 2020-08-10
Payer: COMMERCIAL

## 2020-08-10 VITALS — SYSTOLIC BLOOD PRESSURE: 117 MMHG | HEART RATE: 80 BPM | DIASTOLIC BLOOD PRESSURE: 69 MMHG

## 2020-08-10 LAB
ANION GAP SERPL CALC-SCNC: 8 MMOL/L — SIGNIFICANT CHANGE UP (ref 5–17)
BUN SERPL-MCNC: 25 MG/DL — HIGH (ref 7–23)
CALCIUM SERPL-MCNC: 8.4 MG/DL — SIGNIFICANT CHANGE UP (ref 8.4–10.5)
CHLORIDE SERPL-SCNC: 100 MMOL/L — SIGNIFICANT CHANGE UP (ref 96–108)
CO2 SERPL-SCNC: 25 MMOL/L — SIGNIFICANT CHANGE UP (ref 22–31)
CREAT SERPL-MCNC: 0.88 MG/DL — SIGNIFICANT CHANGE UP (ref 0.5–1.3)
GLUCOSE BLDC GLUCOMTR-MCNC: 185 MG/DL — HIGH (ref 70–99)
GLUCOSE BLDC GLUCOMTR-MCNC: 82 MG/DL — SIGNIFICANT CHANGE UP (ref 70–99)
GLUCOSE SERPL-MCNC: 82 MG/DL — SIGNIFICANT CHANGE UP (ref 70–99)
HCT VFR BLD CALC: 26.2 % — LOW (ref 39–50)
HGB BLD-MCNC: 7.5 G/DL — LOW (ref 13–17)
MCHC RBC-ENTMCNC: 22.1 PG — LOW (ref 27–34)
MCHC RBC-ENTMCNC: 28.6 GM/DL — LOW (ref 32–36)
MCV RBC AUTO: 77.1 FL — LOW (ref 80–100)
NRBC # BLD: 0 /100 WBCS — SIGNIFICANT CHANGE UP (ref 0–0)
PLATELET # BLD AUTO: 529 K/UL — HIGH (ref 150–400)
POTASSIUM SERPL-MCNC: 4.8 MMOL/L — SIGNIFICANT CHANGE UP (ref 3.5–5.3)
POTASSIUM SERPL-SCNC: 4.8 MMOL/L — SIGNIFICANT CHANGE UP (ref 3.5–5.3)
RBC # BLD: 3.4 M/UL — LOW (ref 4.2–5.8)
RBC # FLD: 18.5 % — HIGH (ref 10.3–14.5)
SODIUM SERPL-SCNC: 133 MMOL/L — LOW (ref 135–145)
WBC # BLD: 7.17 K/UL — SIGNIFICANT CHANGE UP (ref 3.8–10.5)
WBC # FLD AUTO: 7.17 K/UL — SIGNIFICANT CHANGE UP (ref 3.8–10.5)

## 2020-08-10 PROCEDURE — 73590 X-RAY EXAM OF LOWER LEG: CPT

## 2020-08-10 PROCEDURE — 87040 BLOOD CULTURE FOR BACTERIA: CPT

## 2020-08-10 PROCEDURE — 85652 RBC SED RATE AUTOMATED: CPT

## 2020-08-10 PROCEDURE — 93295 DEV INTERROG REMOTE 1/2/MLT: CPT

## 2020-08-10 PROCEDURE — 85730 THROMBOPLASTIN TIME PARTIAL: CPT

## 2020-08-10 PROCEDURE — 87116 MYCOBACTERIA CULTURE: CPT

## 2020-08-10 PROCEDURE — 73552 X-RAY EXAM OF FEMUR 2/>: CPT

## 2020-08-10 PROCEDURE — 89060 EXAM SYNOVIAL FLUID CRYSTALS: CPT

## 2020-08-10 PROCEDURE — 80076 HEPATIC FUNCTION PANEL: CPT

## 2020-08-10 PROCEDURE — 87075 CULTR BACTERIA EXCEPT BLOOD: CPT

## 2020-08-10 PROCEDURE — 87015 SPECIMEN INFECT AGNT CONCNTJ: CPT

## 2020-08-10 PROCEDURE — 84100 ASSAY OF PHOSPHORUS: CPT

## 2020-08-10 PROCEDURE — 85610 PROTHROMBIN TIME: CPT

## 2020-08-10 PROCEDURE — 36430 TRANSFUSION BLD/BLD COMPNT: CPT

## 2020-08-10 PROCEDURE — 73560 X-RAY EXAM OF KNEE 1 OR 2: CPT

## 2020-08-10 PROCEDURE — 85025 COMPLETE CBC W/AUTO DIFF WBC: CPT

## 2020-08-10 PROCEDURE — C9363: CPT

## 2020-08-10 PROCEDURE — 97530 THERAPEUTIC ACTIVITIES: CPT

## 2020-08-10 PROCEDURE — 36573 INSJ PICC RS&I 5 YR+: CPT

## 2020-08-10 PROCEDURE — 87086 URINE CULTURE/COLONY COUNT: CPT

## 2020-08-10 PROCEDURE — 87186 SC STD MICRODIL/AGAR DIL: CPT

## 2020-08-10 PROCEDURE — 80202 ASSAY OF VANCOMYCIN: CPT

## 2020-08-10 PROCEDURE — 73562 X-RAY EXAM OF KNEE 3: CPT

## 2020-08-10 PROCEDURE — 99285 EMERGENCY DEPT VISIT HI MDM: CPT | Mod: 25

## 2020-08-10 PROCEDURE — 87070 CULTURE OTHR SPECIMN AEROBIC: CPT

## 2020-08-10 PROCEDURE — 83605 ASSAY OF LACTIC ACID: CPT

## 2020-08-10 PROCEDURE — C1713: CPT

## 2020-08-10 PROCEDURE — C1769: CPT

## 2020-08-10 PROCEDURE — 82803 BLOOD GASES ANY COMBINATION: CPT

## 2020-08-10 PROCEDURE — 83735 ASSAY OF MAGNESIUM: CPT

## 2020-08-10 PROCEDURE — 97535 SELF CARE MNGMENT TRAINING: CPT

## 2020-08-10 PROCEDURE — 89051 BODY FLUID CELL COUNT: CPT

## 2020-08-10 PROCEDURE — 86901 BLOOD TYPING SEROLOGIC RH(D): CPT

## 2020-08-10 PROCEDURE — 87206 SMEAR FLUORESCENT/ACID STAI: CPT

## 2020-08-10 PROCEDURE — 84132 ASSAY OF SERUM POTASSIUM: CPT

## 2020-08-10 PROCEDURE — 87635 SARS-COV-2 COVID-19 AMP PRB: CPT

## 2020-08-10 PROCEDURE — 80162 ASSAY OF DIGOXIN TOTAL: CPT

## 2020-08-10 PROCEDURE — 97161 PT EVAL LOW COMPLEX 20 MIN: CPT

## 2020-08-10 PROCEDURE — 87102 FUNGUS ISOLATION CULTURE: CPT

## 2020-08-10 PROCEDURE — 99239 HOSP IP/OBS DSCHRG MGMT >30: CPT

## 2020-08-10 PROCEDURE — 88304 TISSUE EXAM BY PATHOLOGIST: CPT

## 2020-08-10 PROCEDURE — 86850 RBC ANTIBODY SCREEN: CPT

## 2020-08-10 PROCEDURE — 93296 REM INTERROG EVL PM/IDS: CPT

## 2020-08-10 PROCEDURE — 81001 URINALYSIS AUTO W/SCOPE: CPT

## 2020-08-10 PROCEDURE — 83880 ASSAY OF NATRIURETIC PEPTIDE: CPT

## 2020-08-10 PROCEDURE — 82550 ASSAY OF CK (CPK): CPT

## 2020-08-10 PROCEDURE — 97116 GAIT TRAINING THERAPY: CPT

## 2020-08-10 PROCEDURE — 82330 ASSAY OF CALCIUM: CPT

## 2020-08-10 PROCEDURE — 86923 COMPATIBILITY TEST ELECTRIC: CPT

## 2020-08-10 PROCEDURE — C8929: CPT

## 2020-08-10 PROCEDURE — P9016: CPT

## 2020-08-10 PROCEDURE — 80053 COMPREHEN METABOLIC PANEL: CPT

## 2020-08-10 PROCEDURE — 86140 C-REACTIVE PROTEIN: CPT

## 2020-08-10 PROCEDURE — 93005 ELECTROCARDIOGRAM TRACING: CPT

## 2020-08-10 PROCEDURE — 96374 THER/PROPH/DIAG INJ IV PUSH: CPT

## 2020-08-10 PROCEDURE — 97164 PT RE-EVAL EST PLAN CARE: CPT

## 2020-08-10 PROCEDURE — 71045 X-RAY EXAM CHEST 1 VIEW: CPT

## 2020-08-10 PROCEDURE — 80048 BASIC METABOLIC PNL TOTAL CA: CPT

## 2020-08-10 PROCEDURE — 99232 SBSQ HOSP IP/OBS MODERATE 35: CPT | Mod: GC

## 2020-08-10 PROCEDURE — 87205 SMEAR GRAM STAIN: CPT

## 2020-08-10 PROCEDURE — 36415 COLL VENOUS BLD VENIPUNCTURE: CPT

## 2020-08-10 PROCEDURE — 82962 GLUCOSE BLOOD TEST: CPT

## 2020-08-10 PROCEDURE — 83036 HEMOGLOBIN GLYCOSYLATED A1C: CPT

## 2020-08-10 PROCEDURE — 97110 THERAPEUTIC EXERCISES: CPT

## 2020-08-10 PROCEDURE — 84295 ASSAY OF SERUM SODIUM: CPT

## 2020-08-10 PROCEDURE — 96361 HYDRATE IV INFUSION ADD-ON: CPT

## 2020-08-10 PROCEDURE — 87150 DNA/RNA AMPLIFIED PROBE: CPT

## 2020-08-10 PROCEDURE — 93312 ECHO TRANSESOPHAGEAL: CPT

## 2020-08-10 PROCEDURE — 85027 COMPLETE CBC AUTOMATED: CPT

## 2020-08-10 PROCEDURE — 96375 TX/PRO/DX INJ NEW DRUG ADDON: CPT

## 2020-08-10 PROCEDURE — 97162 PT EVAL MOD COMPLEX 30 MIN: CPT

## 2020-08-10 PROCEDURE — 93970 EXTREMITY STUDY: CPT

## 2020-08-10 RX ORDER — HYDROMORPHONE HYDROCHLORIDE 2 MG/ML
1 INJECTION INTRAMUSCULAR; INTRAVENOUS; SUBCUTANEOUS
Qty: 20 | Refills: 0
Start: 2020-08-10

## 2020-08-10 RX ORDER — ASPIRIN/CALCIUM CARB/MAGNESIUM 324 MG
1 TABLET ORAL
Qty: 60 | Refills: 0
Start: 2020-08-10 | End: 2020-09-08

## 2020-08-10 RX ORDER — OXYCODONE HYDROCHLORIDE 5 MG/1
1 TABLET ORAL
Qty: 30 | Refills: 0
Start: 2020-08-10

## 2020-08-10 RX ORDER — CELECOXIB 200 MG/1
1 CAPSULE ORAL
Qty: 60 | Refills: 0
Start: 2020-08-10

## 2020-08-10 RX ORDER — EZETIMIBE 10 MG/1
1 TABLET ORAL
Qty: 0 | Refills: 0 | DISCHARGE

## 2020-08-10 RX ORDER — OXYCODONE HYDROCHLORIDE 5 MG/1
1 TABLET ORAL
Qty: 28 | Refills: 0
Start: 2020-08-10 | End: 2020-08-23

## 2020-08-10 RX ORDER — ACETAMINOPHEN 500 MG
3 TABLET ORAL
Qty: 90 | Refills: 0
Start: 2020-08-10

## 2020-08-10 RX ADMIN — POLYETHYLENE GLYCOL 3350 17 GRAM(S): 17 POWDER, FOR SOLUTION ORAL at 11:16

## 2020-08-10 RX ADMIN — PANTOPRAZOLE SODIUM 40 MILLIGRAM(S): 20 TABLET, DELAYED RELEASE ORAL at 06:44

## 2020-08-10 RX ADMIN — Medication 975 MILLIGRAM(S): at 13:01

## 2020-08-10 RX ADMIN — GABAPENTIN 100 MILLIGRAM(S): 400 CAPSULE ORAL at 13:01

## 2020-08-10 RX ADMIN — OXYCODONE HYDROCHLORIDE 10 MILLIGRAM(S): 5 TABLET ORAL at 18:13

## 2020-08-10 RX ADMIN — MORPHINE SULFATE 2 MILLIGRAM(S): 50 CAPSULE, EXTENDED RELEASE ORAL at 11:35

## 2020-08-10 RX ADMIN — Medication 250 MILLIGRAM(S): at 05:18

## 2020-08-10 RX ADMIN — Medication 975 MILLIGRAM(S): at 06:04

## 2020-08-10 RX ADMIN — Medication 1 APPLICATION(S): at 11:45

## 2020-08-10 RX ADMIN — MORPHINE SULFATE 2 MILLIGRAM(S): 50 CAPSULE, EXTENDED RELEASE ORAL at 03:25

## 2020-08-10 RX ADMIN — DULOXETINE HYDROCHLORIDE 90 MILLIGRAM(S): 30 CAPSULE, DELAYED RELEASE ORAL at 11:16

## 2020-08-10 RX ADMIN — Medication 975 MILLIGRAM(S): at 14:00

## 2020-08-10 RX ADMIN — Medication 81 MILLIGRAM(S): at 17:17

## 2020-08-10 RX ADMIN — Medication 250 MILLIGRAM(S): at 17:17

## 2020-08-10 RX ADMIN — GABAPENTIN 100 MILLIGRAM(S): 400 CAPSULE ORAL at 05:59

## 2020-08-10 RX ADMIN — MORPHINE SULFATE 2 MILLIGRAM(S): 50 CAPSULE, EXTENDED RELEASE ORAL at 11:26

## 2020-08-10 RX ADMIN — Medication 8 UNIT(S): at 12:26

## 2020-08-10 RX ADMIN — Medication 2: at 12:26

## 2020-08-10 RX ADMIN — OXYCODONE HYDROCHLORIDE 10 MILLIGRAM(S): 5 TABLET ORAL at 05:48

## 2020-08-10 RX ADMIN — HEPARIN SODIUM 5000 UNIT(S): 5000 INJECTION INTRAVENOUS; SUBCUTANEOUS at 06:00

## 2020-08-10 RX ADMIN — PRASUGREL 10 MILLIGRAM(S): 5 TABLET, FILM COATED ORAL at 11:15

## 2020-08-10 RX ADMIN — HEPARIN SODIUM 5000 UNIT(S): 5000 INJECTION INTRAVENOUS; SUBCUTANEOUS at 13:01

## 2020-08-10 RX ADMIN — OXYCODONE HYDROCHLORIDE 10 MILLIGRAM(S): 5 TABLET ORAL at 05:18

## 2020-08-10 RX ADMIN — Medication 975 MILLIGRAM(S): at 06:08

## 2020-08-10 RX ADMIN — Medication 25 MILLIGRAM(S): at 11:16

## 2020-08-10 RX ADMIN — MORPHINE SULFATE 2 MILLIGRAM(S): 50 CAPSULE, EXTENDED RELEASE ORAL at 03:45

## 2020-08-10 RX ADMIN — CHLORHEXIDINE GLUCONATE 1 APPLICATION(S): 213 SOLUTION TOPICAL at 06:42

## 2020-08-10 RX ADMIN — SPIRONOLACTONE 25 MILLIGRAM(S): 25 TABLET, FILM COATED ORAL at 11:16

## 2020-08-10 RX ADMIN — OXYCODONE HYDROCHLORIDE 10 MILLIGRAM(S): 5 TABLET ORAL at 17:17

## 2020-08-10 RX ADMIN — Medication 81 MILLIGRAM(S): at 05:59

## 2020-08-10 NOTE — PROGRESS NOTE ADULT - PROBLEM SELECTOR PLAN 9
The patient is hemodynamically stable.  The pain is controlled.  Patient is on DVT prophylaxis.  Patient is using incentive spirometry.  Oxygen saturation is acceptable.  Advance diet as tolerated.  Advance activity as tolerated.  Monitor for ileus.  Patient is on Laxatives.  Surgical wound is stable.  No indication for monitor bed.

## 2020-08-10 NOTE — PROGRESS NOTE ADULT - PROBLEM SELECTOR PLAN 3
Continue insulin sliding scale. Maintain her blood sugar in the range between 140- 180, and not below 70. Monitor for hypoglycemia. Monitor blood sugar with advancing diet. Hold oral hypoglycemic agents during hospitalization. Adjust insulin..
Continue insulin sliding scale. Maintain her blood sugar in the range between 140- 180, and not below 70. Monitor for hypoglycemia. Monitor blood sugar with advancing diet. Hold oral hypoglycemic agents during hospitalization. Adjust insulin..  BS is elevated and restarted insulin and incraesed premeal
Continue insulin sliding scale. Maintain her blood sugar in the range between 140- 180, and not below 70. Monitor for hypoglycemia. Monitor blood sugar with advancing diet. Hold oral hypoglycemic agents during hospitalization. Adjust insulin..  he wa sstarted on Lantus and pre-meal insulin.  I will adjust the dosage
Continue insulin sliding scale. Maintain her blood sugar in the range between 140- 180, and not below 70. Monitor for hypoglycemia. Monitor blood sugar with advancing diet. Hold oral hypoglycemic agents during hospitalization. Adjust insulin.
Continue insulin sliding scale. Maintain her blood sugar in the range between 140- 180, and not below 70. Monitor for hypoglycemia. Monitor blood sugar with advancing diet. Hold oral hypoglycemic agents during hospitalization. Adjust insulin.
Continue insulin sliding scale. Maintain her blood sugar in the range between 140- 180, and not below 70. Monitor for hypoglycemia. Monitor blood sugar with advancing diet. Hold oral hypoglycemic agents during hospitalization. Adjust insulin..
Continue insulin sliding scale. Maintain her blood sugar in the range between 140- 180, and not below 70. Monitor for hypoglycemia. Monitor blood sugar with advancing diet. Hold oral hypoglycemic agents during hospitalization. Adjust insulin..  BS is better controlled
Continue insulin sliding scale. Maintain her blood sugar in the range between 140- 180, and not below 70. Monitor for hypoglycemia. Monitor blood sugar with advancing diet. Hold oral hypoglycemic agents during hospitalization. Adjust insulin..  BS is elevated and restarted insulin and incraesed premeal and also increased lantus
Continue insulin sliding scale. Maintain her blood sugar in the range between 140- 180, and not below 70. Monitor for hypoglycemia. Monitor blood sugar with advancing diet. Hold oral hypoglycemic agents during hospitalization. Adjust insulin..  BS is elevated and restarted insulin and incraesed premeal and also increased lantus
Continue insulin sliding scale. Maintain her blood sugar in the range between 140- 180, and not below 70. Monitor for hypoglycemia. Monitor blood sugar with advancing diet. Hold oral hypoglycemic agents during hospitalization. Adjust insulin..  BS is less controlled  but he is for OR tomorrow
Continue insulin sliding scale. Maintain her blood sugar in the range between 140- 180, and not below 70. Monitor for hypoglycemia. Monitor blood sugar with advancing diet. Hold oral hypoglycemic agents during hospitalization. Adjust insulin..  he was started on Lantus and pre-meal insulin.  I will adjust the dosage.  BS is better controlled
Continue insulin sliding scale. Maintain her blood sugar in the range between 140- 180, and not below 70. Monitor for hypoglycemia. Monitor blood sugar with advancing diet. Hold oral hypoglycemic agents during hospitalization. Adjust insulin..  he was started on Lantus and pre-meal insulin.  I will adjust the dosage.  BS is better controlled
Continue insulin sliding scale. Maintain her blood sugar in the range between 140- 180, and not below 70. Monitor for hypoglycemia. Monitor blood sugar with advancing diet. Hold oral hypoglycemic agents during hospitalization. Adjust insulin..  BS is elevated and restarted insulin and incraesed premeal and also increased lantus

## 2020-08-10 NOTE — PROGRESS NOTE ADULT - PROVIDER SPECIALTY LIST ADULT
Cardiology
Infectious Disease
Internal Medicine
Orthopedics
Plastic Surgery
SICU
Vascular Surgery
Orthopedics
Cardiology
Infectious Disease
Internal Medicine

## 2020-08-10 NOTE — PROGRESS NOTE ADULT - PROBLEM SELECTOR PLAN 8
Dr. Caldera evaluated the pt, no evidence of decompensated CHF.
Dr. Caldera will evaluate the pt, no evidence of decompensated CHF.
Dr. Caldera will evaluate the pt, no evidence of decompensated CHF.
Dr. Caldera evaluated the pt, no evidence of decompensated CHF.
Dr. Caldera will evaluate the pt, no evidence of decompensated CHF.

## 2020-08-10 NOTE — PROGRESS NOTE ADULT - PROBLEM SELECTOR PLAN 6
stable

## 2020-08-10 NOTE — PROGRESS NOTE ADULT - PROBLEM SELECTOR PROBLEM 1
Anemia due to blood loss
Sepsis
Anemia due to blood loss
Sepsis
Sepsis

## 2020-08-10 NOTE — PROGRESS NOTE ADULT - PROBLEM SELECTOR PROBLEM 2
Sepsis
Pyogenic arthritis of right knee joint, due to unspecified organism
Sepsis
Pyogenic arthritis of right knee joint, due to unspecified organism
Pyogenic arthritis of right knee joint, due to unspecified organism

## 2020-08-10 NOTE — PROGRESS NOTE ADULT - PROBLEM SELECTOR PROBLEM 6
Essential hypertension
MRSA bacteremia
MRSA bacteremia
Essential hypertension

## 2020-08-10 NOTE — PROGRESS NOTE ADULT - PROBLEM SELECTOR PLAN 5
positive and will follow on repeat cultures  no evidence of endocarditis  from previous admissions  repeat cultures negative
positive and will follow on repat cultures  no evidence of endocarditis  from previous admissions
positive and will follow on repeat cultures  no evidence of endocarditis  from previous admissions  repeat cultures negative
positive and will follow on repeat cultures  no evidence of endocarditis  from previous admissions  repeat cultures negative
resolved, f/u blood c/s  no evidence of endocarditis  from previous admissions
positive and will follow on repat cultures  no evidence of endocarditis  from previous admissions
positive and will follow on repeat cultures  no evidence of endocarditis  from previous admissions
positive and will follow on repeat cultures  no evidence of endocarditis  from previous admissions  repeat cultures negative
resolved, f/u blood c/s  no evidence of endocarditis
resolved, f/u blood c/s  no evidence of endocarditis
positive and will follow on repat cultures  no evidence of endocarditis  from previous admissions

## 2020-08-10 NOTE — PROGRESS NOTE ADULT - SUBJECTIVE AND OBJECTIVE BOX
Ortho Note    Pt comfortable without complaints, pain controlled  Denies CP, SOB, N/V, numbness/tingling     Vital Signs Last 24 Hrs  T(C): 36.7 (08-10-20 @ 14:35), Max: 36.7 (08-10-20 @ 08:55)  T(F): 98.1 (08-10-20 @ 14:35), Max: 98.1 (08-10-20 @ 14:35)  HR: 85 (08-10-20 @ 14:35) (85 - 86)  BP: 84/69 (08-10-20 @ 14:35) (84/69 - 94/68)  BP(mean): --  RR: 18 (08-10-20 @ 14:35) (18 - 19)  SpO2: 96% (08-10-20 @ 14:35) (96% - 96%)  AVSS    General: Pt Alert and oriented, NAD  Dressing C/D/I  Pulses:  Sensation:  Motor: EHL/FHL/TA/GS                          7.5    7.17  )-----------( 529      ( 10 Aug 2020 07:26 )             26.2   10 Aug 2020 07:26    133    |  100    |  25     ----------------------------<  82     4.8     |  25     |  0.88           A/P: 65M s/p right knee arthroscopic I+D 7/17, right knee explant and abx spacer 7/22,   with debridement of LLE wound with wound vac placement on 7/30.    - H+H 7.5, VSS, continue to monitor labs  - ESR 60 Crp 5.03 - stable as of 8/8   - Pain Control c/w oxycontin standing and PRN regimen   - DVT ppx: effient/ HSQ/ ASA- plan for ASA 81mg BID on d/c   - PT, WBS: TTWB RLE  - OOB to chair as tolerated, aggressive I/S  - appreciate ID recs- continue vancomycin and rifmapin, follow-up trophs prior to every 4th dose  - appreciate internal medicine recs  - wound vac changes to LLE vascular wound tomorrow by Plastics; then will be 1x/ week for 4 weeks on Fridays outpatient by homecare  - dispo: home once HH services established - tonight vs tomorrow     Ortho Pager [phone]

## 2020-08-10 NOTE — PROGRESS NOTE ADULT - PROBLEM SELECTOR PROBLEM 7
Pyogenic arthritis of right knee joint, due to unspecified organism
Pure hypertriglyceridemia
Pyogenic arthritis of right knee joint, due to unspecified organism
Pure hypertriglyceridemia

## 2020-08-10 NOTE — PROGRESS NOTE ADULT - SUBJECTIVE AND OBJECTIVE BOX
Interval Events: Reviewed  Patient seen and examined at bedside.    Patient is a 65y old  Male who presents with a chief complaint of septic knee (07 Aug 2020 08:02)    he is doign well  PAST MEDICAL & SURGICAL HISTORY:  Diabetic neuropathy  STEMI (ST elevation myocardial infarction)  Diverticulitis  MRSA bacteremia  History of celiac disease  CHF (congestive heart failure): EF ~ 25%  HTN (hypertension)  Diabetes: on insulin pump  Blood clot due to device, implant, or graft: was on blood thinners  HLD (hyperlipidemia)  Osteoarthritis  Atherosclerosis of coronary artery: CAD (coronary artery disease)  Status post percutaneous transluminal coronary angioplasty: in 2012  History of open reduction and internal fixation (ORIF) procedure  Surgery, elective: Right shoulder  Surgery, elective: right knee wound debridement  S/P TKR (total knee replacement), right: with infection Mrsa   per pt he was cleared from MRSA infection  S/P CABG x 1: 2018  Stented coronary artery: 10/18 heart attack  INFERIOR WALL MI  Other postprocedural status: Fixation hardware in foot LEFT      MEDICATIONS:  Pulmonary:    Antimicrobials:  rifAMPin 300 milliGRAM(s) Oral every 12 hours  vancomycin  IVPB 750 milliGRAM(s) IV Intermittent every 12 hours    Anticoagulants:  aspirin enteric coated 81 milliGRAM(s) Oral two times a day  heparin   Injectable 5000 Unit(s) SubCutaneous every 8 hours  prasugrel 10 milliGRAM(s) Oral daily    Cardiac:  digoxin     Tablet 0.125 milliGRAM(s) Oral daily  metoprolol succinate ER 25 milliGRAM(s) Oral daily  spironolactone 25 milliGRAM(s) Oral daily  tamsulosin 0.4 milliGRAM(s) Oral at bedtime      Allergies    ACE inhibitors (Hives)  carvedilol (Other)  enalapril (Hives)  Entresto (Other)    Intolerances        Vital Signs Last 24 Hrs  T(C): 36.7 (10 Aug 2020 08:55), Max: 36.7 (10 Aug 2020 08:55)  T(F): 98 (10 Aug 2020 08:55), Max: 98 (10 Aug 2020 08:55)  HR: 86 (10 Aug 2020 08:55) (82 - 86)  BP: 94/68 (10 Aug 2020 08:55) (94/68 - 110/68)  BP(mean): 72 (10 Aug 2020 06:21) (72 - 72)  RR: 19 (10 Aug 2020 08:55) (17 - 19)  SpO2: 96% (10 Aug 2020 08:55) (92% - 98%)    08-09 @ 07:01  -  08-10 @ 07:00  --------------------------------------------------------  IN: 600 mL / OUT: 1585 mL / NET: -985 mL    08-10 @ 07:01  -  08-10 @ 14:00  --------------------------------------------------------  IN: 360 mL / OUT: 300 mL / NET: 60 mL          Review of Systems:   •	General: negative  •	Skin/Breast: negative  •	Ophthalmologic: negative  •	ENMT: negative  •	Respiratory and Thorax: negative  •	Cardiovascular: negative  •	Gastrointestinal: negative  •	Genitourinary: negative  •	Musculoskeletal: negative  •	Neurological: negative  •	Psychiatric: negative  •	Hematology/Lymphatics: negative  •	Endocrine: negative  •	Allergic/Immunologic: negative    Physical Exam:   • Constitutional:	Well-developed, well nourished  • Eyes:	EOMI; PERRL; no drainage or redness  • ENMT:	No oral lesions; no gross abnormalities  • Neck	No bruits; no thyromegaly or nodules  • Breasts:	not examined  • Back:	No deformity or limitation of movement  • Respiratory:	Breath Sounds equal & clear to percussion & auscultation, no accessory muscle use  • Cardiovascular:	Regular rate & rhythm, normal S1, S2; no murmurs, gallops or rubs; no S3, S4  • Gastrointestinal:	Soft, non-tender, no hepatosplenomegaly, normal bowel sounds  • Genitourinary:	not examined  • Rectal: not examined  • Extremities:	No cyanosis, clubbing or edema  • Vascular:	Equal and normal pulses (carotid, femoral, dorsalis pedis)  • Neurologica:l	not examined  • Skin:	No lesions; no rash  • Lymph Nodes:	No lymphadedenopathy  • Musculoskeletal:	No joint pain, swelling or deformity; no limitation of movement        LABS:      CBC Full  -  ( 10 Aug 2020 07:26 )  WBC Count : 7.17 K/uL  RBC Count : 3.40 M/uL  Hemoglobin : 7.5 g/dL  Hematocrit : 26.2 %  Platelet Count - Automated : 529 K/uL  Mean Cell Volume : 77.1 fl  Mean Cell Hemoglobin : 22.1 pg  Mean Cell Hemoglobin Concentration : 28.6 gm/dL  Auto Neutrophil # : x  Auto Lymphocyte # : x  Auto Monocyte # : x  Auto Eosinophil # : x  Auto Basophil # : x  Auto Neutrophil % : x  Auto Lymphocyte % : x  Auto Monocyte % : x  Auto Eosinophil % : x  Auto Basophil % : x    08-10    133<L>  |  100  |  25<H>  ----------------------------<  82  4.8   |  25  |  0.88    Ca    8.4      10 Aug 2020 07:26                          RADIOLOGY & ADDITIONAL STUDIES (The following images were personally reviewed):  Loja:                                     No  Urine output:                       adequate  DVT prophylaxis:                 Yes  Flattus:                                  Yes  Bowel movement:              No

## 2020-08-10 NOTE — PROGRESS NOTE ADULT - PROBLEM SELECTOR PROBLEM 5
MRSA bacteremia
Atherosclerosis of native coronary artery of native heart without angina pectoris
Atherosclerosis of native coronary artery of native heart without angina pectoris
MRSA bacteremia

## 2020-08-10 NOTE — PROGRESS NOTE ADULT - PROBLEM SELECTOR PROBLEM 9
Postoperative examination

## 2020-08-10 NOTE — PROGRESS NOTE ADULT - PROBLEM SELECTOR PLAN 7
on statin

## 2020-08-10 NOTE — PROGRESS NOTE ADULT - PROBLEM SELECTOR PLAN 4
as above

## 2020-08-10 NOTE — PROGRESS NOTE ADULT - REASON FOR ADMISSION
septic knee
R knee infected prosthesis
R periprosthetic joint infection
R periprosthetic joint infection
septic knee
septic knee, h/o CAD
joint infection
right knee infection
prosthetic knee infection
sepsis

## 2020-08-10 NOTE — PROGRESS NOTE ADULT - SUBJECTIVE AND OBJECTIVE BOX
INTERVAL HISTORY:  No chest pain or dyspnea. Inactive. 	  Chart, PMH medications and allergies reviewed    MEDICATIONS:  MEDICATIONS  (STANDING):  acetaminophen   Tablet .. 975 milliGRAM(s) Oral every 8 hours  aspirin enteric coated 81 milliGRAM(s) Oral two times a day  atorvastatin 40 milliGRAM(s) Oral at bedtime  chlorhexidine 2% Cloths 1 Application(s) Topical <User Schedule>  dextrose 5%. 1000 milliLiter(s) (50 mL/Hr) IV Continuous <Continuous>  dextrose 50% Injectable 12.5 Gram(s) IV Push once  dextrose 50% Injectable 25 Gram(s) IV Push once  dextrose 50% Injectable 25 Gram(s) IV Push once  digoxin     Tablet 0.125 milliGRAM(s) Oral daily  DULoxetine 90 milliGRAM(s) Oral daily  gabapentin 100 milliGRAM(s) Oral three times a day  heparin   Injectable 5000 Unit(s) SubCutaneous every 8 hours  insulin glargine Injectable (LANTUS) 16 Unit(s) SubCutaneous at bedtime  insulin lispro (HumaLOG) corrective regimen sliding scale   SubCutaneous Before meals and at bedtime  insulin lispro Injectable (HumaLOG) 8 Unit(s) SubCutaneous three times a day before meals  metoprolol succinate ER 25 milliGRAM(s) Oral daily  oxyCODONE  ER Tablet 10 milliGRAM(s) Oral every 12 hours  pantoprazole    Tablet 40 milliGRAM(s) Oral before breakfast  polyethylene glycol 3350 17 Gram(s) Oral daily  prasugrel 10 milliGRAM(s) Oral daily  rifAMPin 300 milliGRAM(s) Oral every 12 hours  silver sulfADIAZINE 1% Cream 1 Application(s) Topical two times a day  spironolactone 25 milliGRAM(s) Oral daily  tamsulosin 0.4 milliGRAM(s) Oral at bedtime  vancomycin  IVPB 750 milliGRAM(s) IV Intermittent every 12 hours      REVIEW OF SYSTEMS:  CONSTITUTIONAL: No fever, no weight loss, no fatigue  PULMONARY: No cough, no wheezing, chills no hemoptysis; No Shortness of Breath  CARDIOVASCULAR: No chest pain, no palpitations, no syncope, dizziness, no leg swelling  GASTROINTESTINAL: No abdominal pain. No nausea, no  vomiting, no hematemesis; No diarrhea, no constipation. No melena, no hematochezia.  GENITOURINARY: No dysuria, no frequency, no hematuria, no incontinence  SKIN: No itching, no burning, no rashes, no lesions     PHYSICAL EXAM:  T(C): 36.7 (08-10-20 @ 08:55), Max: 36.7 (08-10-20 @ 08:55)  HR: 86 (08-10-20 @ 08:55) (82 - 86)  BP: 94/68 (08-10-20 @ 08:55) (94/68 - 110/68)  RR: 19 (08-10-20 @ 08:55) (17 - 19)  SpO2: 96% (08-10-20 @ 08:55) (92% - 98%)  Wt(kg): --  I&O's Summary    09 Aug 2020 07:01  -  10 Aug 2020 07:00  --------------------------------------------------------  IN: 600 mL / OUT: 1585 mL / NET: -985 mL    10 Aug 2020 07:01  -  10 Aug 2020 14:26  --------------------------------------------------------  IN: 360 mL / OUT: 300 mL / NET: 60 mL        Appearance:  No deformities, normal appearance	  HEENT:   Normal oral mucosa, PERRL, EOMI	  Neck: No jvd , no masses  Lymphatic: No  lymphadenopathy  Pulmonary: Lungs clear to auscultation and percussion  Cardiovascular: Normal S1 S2, No JVD, No murmur, No edema	  Abdomen:  Soft, Non-tender, + BS  Skin: No rashes, No ecchymoses	  Extremities:  No clubbing or cyanosis  MSK: Normal range of motion, no joint swelling    LABS:		  CARDIAC MARKERS:                         7.5    7.17  )-----------( 529      ( 10 Aug 2020 07:26 )             26.2   08-10    133<L>  |  100  |  25<H>  ----------------------------<  82  4.8   |  25  |  0.88    Ca    8.4      10 Aug 2020 07:26      proBNP:  Lipid Profile:  HgA1c:  TSH:      TELEMETRY: 	      QTC     ECG:  	   QTC         RADIOLOGY:   DIAGNOSTIC TESTING: [ ] Echocardiogram: [ ]  Catheterization: [ ] Stress Test:      ASSESSMENT/PLAN: INTERVAL HISTORY:  No chest pain or dyspnea. Inactive. 	  Chart, PMH medications and allergies reviewed    MEDICATIONS:  MEDICATIONS  (STANDING):  acetaminophen   Tablet .. 975 milliGRAM(s) Oral every 8 hours  aspirin enteric coated 81 milliGRAM(s) Oral two times a day  atorvastatin 40 milliGRAM(s) Oral at bedtime  chlorhexidine 2% Cloths 1 Application(s) Topical <User Schedule>  dextrose 5%. 1000 milliLiter(s) (50 mL/Hr) IV Continuous <Continuous>  dextrose 50% Injectable 12.5 Gram(s) IV Push once  dextrose 50% Injectable 25 Gram(s) IV Push once  dextrose 50% Injectable 25 Gram(s) IV Push once  digoxin     Tablet 0.125 milliGRAM(s) Oral daily  DULoxetine 90 milliGRAM(s) Oral daily  gabapentin 100 milliGRAM(s) Oral three times a day  heparin   Injectable 5000 Unit(s) SubCutaneous every 8 hours  insulin glargine Injectable (LANTUS) 16 Unit(s) SubCutaneous at bedtime  insulin lispro (HumaLOG) corrective regimen sliding scale   SubCutaneous Before meals and at bedtime  insulin lispro Injectable (HumaLOG) 8 Unit(s) SubCutaneous three times a day before meals  metoprolol succinate ER 25 milliGRAM(s) Oral daily  oxyCODONE  ER Tablet 10 milliGRAM(s) Oral every 12 hours  pantoprazole    Tablet 40 milliGRAM(s) Oral before breakfast  polyethylene glycol 3350 17 Gram(s) Oral daily  prasugrel 10 milliGRAM(s) Oral daily  rifAMPin 300 milliGRAM(s) Oral every 12 hours  silver sulfADIAZINE 1% Cream 1 Application(s) Topical two times a day  spironolactone 25 milliGRAM(s) Oral daily  tamsulosin 0.4 milliGRAM(s) Oral at bedtime  vancomycin  IVPB 750 milliGRAM(s) IV Intermittent every 12 hours      REVIEW OF SYSTEMS:  CONSTITUTIONAL: No fever, no weight loss, no fatigue  PULMONARY: No cough, no wheezing, chills no hemoptysis; No Shortness of Breath  CARDIOVASCULAR: No chest pain, no palpitations, no syncope, dizziness, no leg swelling  GASTROINTESTINAL: No abdominal pain. No nausea, no  vomiting, no hematemesis; No diarrhea, no constipation. No melena, no hematochezia.  GENITOURINARY: No dysuria, no frequency, no hematuria, no incontinence  SKIN: No itching, no burning, no rashes, no lesions     PHYSICAL EXAM:  T(C): 36.7 (08-10-20 @ 08:55), Max: 36.7 (08-10-20 @ 08:55)  HR: 86 (08-10-20 @ 08:55) (82 - 86)  BP: 94/68 (08-10-20 @ 08:55) (94/68 - 110/68)  RR: 19 (08-10-20 @ 08:55) (17 - 19)  SpO2: 96% (08-10-20 @ 08:55) (92% - 98%)  Wt(kg): --  I&O's Summary    09 Aug 2020 07:01  -  10 Aug 2020 07:00  --------------------------------------------------------  IN: 600 mL / OUT: 1585 mL / NET: -985 mL    10 Aug 2020 07:01  -  10 Aug 2020 14:26  --------------------------------------------------------  IN: 360 mL / OUT: 300 mL / NET: 60 mL        Appearance:  No deformities, normal appearance	  HEENT:   Normal oral mucosa, PERRL, EOMI	  Neck: No jvd , no masses  Lymphatic: No  lymphadenopathy  Pulmonary: Lungs clear to auscultation and percussion  Cardiovascular: Normal S1 S2, No JVD, No murmur, No edema	  Abdomen:  Soft, Non-tender, + BS  Skin: No rashes, No ecchymoses	  Extremities:  No clubbing or cyanosis  MSK: Normal range of motion, no joint swelling    LABS:		  CARDIAC MARKERS:                         7.5    7.17  )-----------( 529      ( 10 Aug 2020 07:26 )             26.2   08-10    133<L>  |  100  |  25<H>  ----------------------------<  82  4.8   |  25  |  0.88    Ca    8.4      10 Aug 2020 07:26      proBNP:  Lipid Profile:  HgA1c:  TSH:      TELEMETRY: 	      QTC     ECG:  	   QTC         RADIOLOGY:   DIAGNOSTIC TESTING: [ ] Echocardiogram: [ ]  Catheterization: [ ] Stress Test:      ASSESSMENT/PLAN: 	    MRSA sepsis/septic knee- patient is at risk for infected leads.  Defibrillator pocket looks good. Normal WBC and afebrile. Crp is down. Encouraged patient to go to [facility] but home is likely.    Orthostatic dizziness/Severely reduced ejection fraction- No dizziness recently. He is sedentary however denies dyspnea. Chest x-ray is clear. Rx;d with  metoprolol 25mg daily and digoxin. Started on spironolactone 25 daily.  Probably euvolemic.     Coronary artery disease-the patient denies chest discomfort. His EKG is uninterpretable secondary to the pacemaker.  There is no clinical evidence for ischemia. Rx;d with aspirin and atorvastatin.  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     Defibrillator-off monitor, continue metoprolol.    Discharge tonight or tomorrow.

## 2020-08-10 NOTE — PROGRESS NOTE ADULT - ATTENDING COMMENTS
Patient seen and examined with house-staff during bedside rounds.  Resident note read, including vitals, physical findings, laboratory data, and radiological reports.   Revisions included below.  Direct personal management at bed side and extensive interpretation of the data.  Plan was outlined and discussed in details with the housestaff.  Decision making of high complexity  Action taken for acute disease activity to reflect the level of care provided:  - medication reconciliation  - review laboratory data  He is doing well and wound is stable and stable for DC.  he is on antiplatelet

## 2020-08-10 NOTE — PROGRESS NOTE ADULT - SUBJECTIVE AND OBJECTIVE BOX
Ortho Note    Pt seen and examined at bedside, comfortable without complaints, pain controlled  Denies CP, SOB, N/V, numbness/tingling     Vital Signs Last 24 Hrs  T(C): 36.7 (10 Aug 2020 08:55), Max: 36.7 (10 Aug 2020 08:55)  T(F): 98 (10 Aug 2020 08:55), Max: 98 (10 Aug 2020 08:55)  HR: 86 (10 Aug 2020 08:55) (82 - 86)  BP: 94/68 (10 Aug 2020 08:55) (94/68 - 117/73)  BP(mean): 72 (10 Aug 2020 06:21) (72 - 72)  RR: 19 (10 Aug 2020 08:55) (16 - 19)  SpO2: 96% (10 Aug 2020 08:55) (92% - 100%)    General: Pt Alert and oriented, NAD  R knee minimal swelling, no erythema or ecchymosis; telfa DSG C/D/I  Sensation intact s/s/sp/dp/t  Motor: EHL/FHL/TA/GS 5/5  2+ DP pulse  L leg ace and vac in place holding good suction   Sensation intact s/s/sp/dp/t  Motor: EHL/FHL/TA/GS 5/5  2+ DP pulse  Toes WWP                          7.5    7.17  )-----------( 529      ( 10 Aug 2020 07:26 )             26.2         A/P: 65M s/p R knee explant and abx spacer placement 7/22 and L leg integra/wound vac placement 7/30   - Stable  - Pain Control  - DVT ppx: ASA/Effient   - PT, WBS: TTWB RLE  - Appreciate ID recs - continue vanc for 6 weeks  - Continue silvedene to RLE  - Dispo home when home care in place possibly today    Ortho Pager [phone]

## 2020-08-10 NOTE — PROGRESS NOTE ADULT - PROBLEM SELECTOR PLAN 2
9 day s/p arthroscopic I and D, 7/17/2020.  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 right knee explant and antibiotic spacer.
2 day s/p arthroscopic I and D, 7/17/2020.  he will require treatment for MRSA septic arthriti and will need PICC line
2 day s/p arthroscopic I and D, 7/17/2020.  he will require treatment for MRSA septic arthritis and will need PICC line
9 day s/p arthroscopic I and D, 7/17/2020.  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 right knee explant and antibiotic spacer.
9 day s/p arthroscopic I and D, 7/17/2020.  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 right knee explant and antibiotic spacer.
1 day s/p arthroscopic I and D, 7/17/2020
2 day s/p arthroscopic I and D, 7/17/2020
2 day s/p arthroscopic I and D, 7/17/2020.  he will require treatment for MRSA septic arthritis and will need PICC line
2 day s/p arthroscopic I and D, 7/17/2020.  he will require treatment for MRSA septic arthritis and will need PICC line  will discuss changing the hardware
2 day s/p arthroscopic I and D, 7/17/2020.  he will require treatment for MRSA septic arthritis and will need PICC line  will discuss changing the hardware
9 day s/p arthroscopic I and D, 7/17/2020.  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 right knee explant and antibiotic spacer.
8 day s/p arthroscopic I and D, 7/17/2020.  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 right knee explant and antibiotic spacer.

## 2020-08-10 NOTE — PROGRESS NOTE ADULT - PROBLEM SELECTOR PLAN 10
Monitor hemoglobin. Hold transfusion unless hemoglobin is 7, 8 in patient with coronary artery disease, hemodynamic instability such as tachycardia/hypotension, or there is evidence of acute blood loss.  Anemia is due to postoperative blood loss
Monitor hemoglobin. Hold transfusion unless hemoglobin is 7, 8 in patient with coronary artery disease, hemodynamic instability such as tachycardia/hypotension, or there is evidence of acute blood loss.  Anemia is due to postoperative blood loss.  May need transfusion
Monitor hemoglobin. Hold transfusion unless hemoglobin is 7, 8 in patient with coronary artery disease, hemodynamic instability such as tachycardia/hypotension, or there is evidence of acute blood loss.  Anemia is due to postoperative blood loss.  May need transfusion
Monitor hemoglobin. Hold transfusion unless hemoglobin is 7, 8 in patient with coronary artery disease, hemodynamic instability such as tachycardia/hypotension, or there is evidence of acute blood loss.  Anemia is due to postoperative blood loss
Monitor hemoglobin. Hold transfusion unless hemoglobin is 7, 8 in patient with coronary artery disease, hemodynamic instability such as tachycardia/hypotension, or there is evidence of acute blood loss.  Anemia is due to postoperative blood loss
Monitor hemoglobin. Hold transfusion unless hemoglobin is 7, 8 in patient with coronary artery disease, hemodynamic instability such as tachycardia/hypotension, or there is evidence of acute blood loss.  Anemia is due to postoperative blood loss.  May need transfusion

## 2020-08-10 NOTE — PROGRESS NOTE ADULT - PROBLEM SELECTOR PROBLEM 8
Atherosclerosis of native coronary artery of native heart without angina pectoris

## 2020-08-10 NOTE — PROGRESS NOTE ADULT - PROBLEM SELECTOR PROBLEM 4
Diabetic polyneuropathy associated with type 1 diabetes mellitus

## 2020-08-10 NOTE — PROGRESS NOTE ADULT - PROBLEM SELECTOR PLAN 1
on broad spectrum antibiotics,   f/u intraop c/s  f/u vanco level  I discussed with ID and ortho he is for OR for removal of hardware and spacer  culture positive for MRSA with negative blood culture repeat but 4 bottles were positive on admission  JOHN PAUL unlikely endocarditis but might need to be treated as such.  The AICD bed is stable with no signs of infection  On Vanco and Rifampin  hardware removed and follow on cultures
on broad spectrum antibiotics,   f/u intraop c/s  f/u vanco level  culture positive for MRSA with negative blood cultures  On Vanco andRifamoin
on broad spectrum antibiotics,   f/u intraop c/s  f/u vanco level and vanco was adjusted  I discussed with ID and ortho he is for OR for removal of hardware and spacer  culture positive for MRSA with negative blood culture repeat but 4 bottles were positive on admission  JOHN PAUL unlikely endocarditis but might need to be treated as such.  The AICD bed is stable with no signs of infection  On Vanco and Rifampin, PICC line Monday.   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
on broad spectrum antibiotics,   f/u intraop c/s  f/u vanco level and vanco was adjusted  I discussed with ID and ortho he is for OR for removal of hardware and spacer  culture positive for MRSA with negative blood culture repeat but 4 bottles were positive on admission  JOHN PAUL unlikely endocarditis but might need to be treated as such.  The AICD bed is stable with no signs of infection  On Vanco and Rifampin, PICC line Monday.   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
on broad spectrum antibiotics,   f/u intraop c/s  f/u vanco level
on broad spectrum antibiotics,   f/u intraop c/s  f/u vanco level
on broad spectrum antibiotics,   f/u intraop c/s  f/u vanco level  I discussed with ID and ortho he is for OR for removal of hardware and spacer  culture positive for MRSA with negative blood culture repeat but 4 bottles were positive on admission  JOHN PAUL unliekly endocarditis but might need to be treated as such.  The AICD bed is stable with no signs of infection  On Vanco and Rifamoin
on broad spectrum antibiotics,   f/u intraop c/s  f/u vanco level  I discussed with ID and ortho he is for OR for removal of hardware and spacer  culture positive for MRSA with negative blood culture repeat but 4 bottles were positive on admission  JOHN PAUL unlikely endocarditis but might need to be treated as such.  The AICD bed is stable with no signs of infection  On Vanco and Rifampin  hardware removed and follow on cultures which is negative to date
on broad spectrum antibiotics,   f/u intraop c/s  f/u vanco level  I discussed with ID and ortho he is for OR for removal of hardware and spacer  culture positive for MRSA with negative blood culture repeat but 4 bottles were positive on admission  JOHN PAUL unlikely endocarditis but might need to be treated as such.  The AICD bed is stable with no signs of infection  On Vanco and Rifampin, PICC line Monday.   hardware removed and follow on cultures which is negative to date
on broad spectrum antibiotics,   f/u intraop c/s  f/u vanco level  I discussed with ID and ortho he is for OR for removal of hardware and spacer  culture positive for MRSA with negative blood culture repeat but 4 bottles were positive on admission  JOHN PAUL unlikely endocarditis but might need to be treated as such.  The AICD bed is stable with no signs of infection  On Vanco and Rifampin, PICC line Monday.   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
on broad spectrum antibiotics,   f/u intraop c/s  f/u vanco level  I discussed with ID and ortho he is for OR for removal of hardware and spacer  culture positive for MRSA with negative blood culture repeat but 4 bottles were positive on admission  JOHN PAUL unlikely endocarditis but might need to be treated as such.  The AICD bed is stable with no signs of infection  On Vanco and Rifampin, PICC line Monday.   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
on broad spectrum antibiotics,   f/u intraop c/s  f/u vanco level  culture positive for MRSA with negative blood culture repeat but 4 bottles were positive on admission  JOHN PAUL unliekly endocarditis but might need to be treated as such.  The AICD bed is stable with no signs of infection  On Vanco and Rifamoin
on broad spectrum antibiotics,   f/u intraop c/s  f/u vanco level and vanco was adjusted  I discussed with ID and ortho he is for OR for removal of hardware and spacer  culture positive for MRSA with negative blood culture repeat but 4 bottles were positive on admission  JOHN PAUL unlikely endocarditis but might need to be treated as such.  The AICD bed is stable with no signs of infection  On Vanco and Rifampin, PICC line Monday.   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
on broad spectrum antibiotics,   f/u intraop c/s  f/u vanco level and vanco was adjusted  I discussed with ID and ortho he is for OR for removal of hardware and spacer  culture positive for MRSA with negative blood culture repeat but 4 bottles were positive on admission  JOHN PAUL unlikely endocarditis but might need to be treated as such.  The AICD bed is stable with no signs of infection  On Vanco and Rifampin, PICC line Monday.   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
on broad spectrum antibiotics,   f/u intraop c/s  f/u vanco level and vanco was adjusted  I discussed with ID and ortho he is for OR for removal of hardware and spacer  culture positive for MRSA with negative blood culture repeat but 4 bottles were positive on admission  JOHN PAUL unlikely endocarditis but might need to be treated as such.  The AICD bed is stable with no signs of infection  On Vanco and Rifampin, PICC line Monday.   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
on broad spectrum antibiotics,   f/u intraop c/s  f/u vanco level  I discussed with ID and ortho he is for OR for removal of hardware and spacer  culture positive for MRSA with negative blood culture repeat but 4 bottles were positive on admission  JOHN PAUL unlikely endocarditis but might need to be treated as such.  The AICD bed is stable with no signs of infection  On Vanco and Rifampin  hardware removed and follow on cultures which is negative to date
on broad spectrum antibiotics,   f/u intraop c/s  f/u vanco level and vanco was adjusted  I discussed with ID and ortho he is for OR for removal of hardware and spacer  culture positive for MRSA with negative blood culture repeat but 4 bottles were positive on admission  JOHN PAUL unlikely endocarditis but might need to be treated as such.  The AICD bed is stable with no signs of infection  On Vanco and Rifampin, PICC line Monday.   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

## 2020-08-10 NOTE — PROGRESS NOTE ADULT - ASSESSMENT
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  He is now s/p arthroscopic I&D in view of prior difficulties with his incision/wound and other co-morbidities.  Unfortunately, he was on cipro while this occurred - no organisms seen on initial gram stain.  Although infection at present may represent persistence from November (MRSA), cannot presume, particularly in view of ST compromise.  He has had extensive antibiotic exposure in the interim and has spent a long time on inpatient services.  He had transaminitis with rifampin in past - would not attempt to add unless Staph grows.   1 day s/p right knee arthroscopic I and D, 7/17/2020
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  He is now s/p arthroscopic I&D in view of prior difficulties with his incision/wound and other co-morbidities.  Unfortunately, he was on cipro while this occurred - no organisms seen on initial gram stain.  Although infection at present may represent persistence from November (MRSA), cannot presume, particularly in view of ST compromise.  He has had extensive antibiotic exposure in the interim and has spent a long time on inpatient services.  He had transaminitis with rifampin in past - would not attempt to add unless Staph grows.   1 day s/p right knee arthroscopic I and D, 7/17/2020
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  He is now s/p arthroscopic I&D in view of prior difficulties with his incision/wound and other co-morbidities.  Unfortunately, he was on cipro while this occurred - no organisms seen on initial gram stain.  Although infection at present may represent persistence from November (MRSA), cannot presume, particularly in view of ST compromise.  He has had extensive antibiotic exposure in the interim and has spent a long time on inpatient services.  He had transaminitis with rifampin in past - would not attempt to add unless Staph grows.   15 day s/p right knee arthroscopic I and D, 7/17/2020  10 days s/p right knee explant, 7/22/2020  2 days s/p LLE wound debridement, application wound vac, 7/30/2020
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  He is now s/p arthroscopic I&D in view of prior difficulties with his incision/wound and other co-morbidities.  Unfortunately, he was on cipro while this occurred - no organisms seen on initial gram stain.  Although infection at present may represent persistence from November (MRSA), cannot presume, particularly in view of ST compromise.  He has had extensive antibiotic exposure in the interim and has spent a long time on inpatient services.  He had transaminitis with rifampin in past - would not attempt to add unless Staph grows.   9 day s/p right knee arthroscopic I and D, 7/17/2020  4 days s/p right knee explant, 7/22/2020
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 Continue Vancomycin 1 gram IV q12hrs.  Check trough prior to tonight's dose.  Goal is 14-17  2.  Continue rifampin 300 mg PO q12  3.  Follow up OR cultures   4.  Will need PICC but would wait until at least 48 hrs post-OR    ID team 2 will follow up.
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  Isolate has same antibiogram as isolate in 11/2019, likely persistence.  JOHN PAUL did not show vegetation.  His WBC, ESR and CRP are increasing.  Vancomycin trough was appropriately timed and is a good level - is only slightly less than 15 and he will need long course of therapy.  He is high risk for development of MOISES.  His liver enzymes are WNL on rifampin.  Suggest:  - Continue to f/u blood cultures from 7/19.    - Continue vancomycin 1 g IV q12h - repeat trough after 3rd administration at that dose.  Trough goal:  14-17  - Continue rifampin 300 mg po q12h.  Please check liver enzymes qod for now.  - Would wait to place PICC until he is post-op to ensure that he does not become bacteremic with line in place.  - Plan for two-stage exchange today per Dr. Castro.  Recommendations discussed with primary team.  Will follow with you - team 2.  Dr. Vaughan will assume care tomorrow.
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  He is now s/p arthroscopic I&D in view of prior difficulties with his incision/wound and other co-morbidities.  Unfortunately, he was on cipro while this occurred - no organisms seen on initial gram stain.  Although infection at present may represent persistence from November (MRSA), cannot presume, particularly in view of ST compromise.  He has had extensive antibiotic exposure in the interim and has spent a long time on inpatient services.  He had transaminitis with rifampin in past - would not attempt to add unless Staph grows.   1 day s/p right knee arthroscopic I and D, 7/17/2020
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  He is now s/p arthroscopic I&D in view of prior difficulties with his incision/wound and other co-morbidities.  Unfortunately, he was on cipro while this occurred - no organisms seen on initial gram stain.  Although infection at present may represent persistence from November (MRSA), cannot presume, particularly in view of ST compromise.  He has had extensive antibiotic exposure in the interim and has spent a long time on inpatient services.  He had transaminitis with rifampin in past - would not attempt to add unless Staph grows.   9 day s/p right knee arthroscopic I and D, 7/17/2020  4 days s/p right knee explant, 7/22/2020
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  He is now s/p arthroscopic I&D in view of prior difficulties with his incision/wound and other co-morbidities.  Unfortunately, he was on cipro while this occurred - no organisms seen on initial gram stain; now s/p R knee washout/polyexchange found to have recurrent MRSA R knee PJI and associated BSI.  - check surveillance bcx--PICC could be placed once surveillance bcx ng X 48h  - s/p TTE  - continue vancomycin 1.25g IV q12h; check trough around 10am prior to dose on 7/19  - d/c Zosyn  - start rifampin 600mg PO q24h (previously reported GI upset with this agent and also had transaminitis); monitor daily CMP
64y/o male with multiple severe comorbidities s/p right knee explant and static spacer for chronic MRSA PJI; also with nonhealing diabetic ulcer left leg    - Wound care with hydrogel + kerlix wet to dry  - No vascular intervention for now  - Continue with follow up at the office once discharged   - discussed with Chief on call
65 M presenting w new onset knee pain in setting of ulcer debridements likely septic knee. Known Non healing wound in contralateral LE (R)    Patient doing well and wound appears to be healing well without any surrounding erythema, discharge, or swelling. Patient's wound care performed at beside using hydragel, kerlix wrap, and ACE wrap.    Recommendations:  - At this time, primary recommendation is wound care with wet to dry dressings daily.  - Continue with follow up at the office once discharged   - discussed with Chief on call  - call /[phone] with questions
65M PMH HTN, HLD, CAD s/p stent (with thrombosis) and CABG, HRrEF (EF 10-15%) s/p St carlyle BiV placement, pHTN (PASP 68), DM, Celiac disease, diverticulitis, R total knee replacement (10/2019) c/b infection, R hip fracture s/p ORIF (02/2020), who presented 7/14 with MRSA infection of R knee now s/p R knee arthroscopic I&D (7/17) and R TKA explant with insertion of antibiotic spacer today (7/22). The procedure was notable for  cc, also given 1.5L LR during case. Given his extensive cardiac history patient was admitted to SICU for postoperative hemodynamic monitoring. HDS overnight.     NEURO: oxy, dilaudid prn, home duloxetine  CV: goal MAP >65, EF 10-15%, s/p BiV, cont w/ asa 81, prasugrel 10, metop 25, digoxin 0.125, atorvastatin 40  PULM: pHTN PASP 68, 3L NC, wean as tolerated  GI/FEN: Diabetic diet, PPI, miralax/senna  : no fields, passed TOV, flomax  ENDO: ISS  HEME: Hgb 7.2 this am from 8.7 post-op (10.5 preop), will give 1u pRBC, trend H/H transfuse if Hgb <8  ID: MRSA in blood and joint, f/u cx 7/19, cont rifampin and vanc  PPx: HSQ  LINES: PIVx2, L rad jayleen  WOUNDS/DRAINS: R knee brace, prevena vac, L shin ulcer (WTD dressing daily)  PT/OT: TTWB RLE
65M POD7 wound debridement, integra and vac placement to L leg.    Vac changes Monday and Thursday by plastics.  Upon discharge, vac instructions: V.A.C. wound care. Change 1 times per week on Fridays with Adaptik underneath sponge covering the entire wound. Ensure pressure set to 125 mmHg. If vac malfunctions, then dampen gauze with saline solution, pack into wound, cover with dry gauze and Kerlix wrap and call Dr. Bowen's office.
65M recurrent R knee prosthetic joint infection p/w MRSA bacteremia and another R knee prosthetic joint infection, now s/p explantation and spacer placement on 7/22.    - cont vanc 1g IV q12h.    - cont rifampin 300mg PO q12h  - f/u OR culture  - likely PICC placement on Monday   - if febrile, send repeat BCx         Team 2 will follow you.    Marta Ruffin MD, MS  Infectious Disease attending  work cell [phone]
65M recurrent R knee prosthetic joint infection p/w MRSA bacteremia and another R knee prosthetic joint infection, now s/p explantation and spacer placement on 7/22.  OR culture so far ngtd.     - cont vanc 1g IV q12h.    - cont rifampin 300mg PO q12h  - f/u OR culture  - PICC tomorrow    - if febrile, send repeat BCx         Team 2 will follow you.    Marta Ruffin MD, MS  Infectious Disease attending  work cell [phone]
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  Isolate has same antibiogram as isolate in 11/2019, likely persistence.  JOHN PAUL did not show vegetation.    Suggest:  - Continue to f/u blood cultures from 7/19.  Can insert PICC on 7/22 if neg for 72 h.  - Continue vancomycin 1 g IV q12h - repeat trough after 3rd administration at that dose  - Continue rifampin 300 mg po q12h.  Please check liver enzymes as he has had transaminitis with rifampin in the past  Recommendations discussed with primary team.  Will follow with you - team 1.
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  He is now s/p arthroscopic I&D in view of prior difficulties with his incision/wound and other co-morbidities.  Unfortunately, he was on cipro while this occurred - no organisms seen on initial gram stain.  Although infection at present may represent persistence from November (MRSA), cannot presume, particularly in view of ST compromise.  He has had extensive antibiotic exposure in the interim and has spent a long time on inpatient services.  He had transaminitis with rifampin in past - would not attempt to add unless Staph grows.   1 day s/p right knee arthroscopic I and D, 7/17/2020
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  He is now s/p arthroscopic I&D in view of prior difficulties with his incision/wound and other co-morbidities.  Unfortunately, he was on cipro while this occurred - no organisms seen on initial gram stain.  Although infection at present may represent persistence from November (MRSA), cannot presume, particularly in view of ST compromise.  He has had extensive antibiotic exposure in the interim and has spent a long time on inpatient services.  He had transaminitis with rifampin in past - would not attempt to add unless Staph grows.   2 day s/p right knee arthroscopic I and D, 7/17/2020
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  He is now s/p arthroscopic I&D in view of prior difficulties with his incision/wound and other co-morbidities.  Unfortunately, he was on cipro while this occurred - no organisms seen on initial gram stain.  Although infection at present may represent persistence from November (MRSA), cannot presume, particularly in view of ST compromise.  He has had extensive antibiotic exposure in the interim and has spent a long time on inpatient services.  He had transaminitis with rifampin in past - would not attempt to add unless Staph grows.   9 day s/p right knee arthroscopic I and D, 7/17/2020  4 days s/p right knee explant, 7/22/2020
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  He is now s/p arthroscopic I&D in view of prior difficulties with his incision/wound and other co-morbidities.  Unfortunately, he was on cipro while this occurred - no organisms seen on initial gram stain.  Although infection at present may represent persistence from November (MRSA), cannot presume, particularly in view of ST compromise.  He has had extensive antibiotic exposure in the interim and has spent a long time on inpatient services.  He had transaminitis with rifampin in past - would not attempt to add unless Staph grows.   9 day s/p right knee arthroscopic I and D, 7/17/2020  4 days s/p right knee explant, 7/22/2020
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  He is now s/p arthroscopic I&D in view of prior difficulties with his incision/wound and other co-morbidities.  Unfortunately, he was on cipro while this occurred - no organisms seen on initial gram stain; now s/p R knee washout/polyexchange found to have recurrent MRSA R knee PJI and associated BSI.  - check surveillance bcx--PICC could be placed once surveillance bcx ng X 48h  - s/p TTE--no obvious vegetations however given ICD may need to consider JOHN PAUL +/- gallium scan during the week  - continue vancomycin 1.25g IV q12h; trough before 3rd dose 7/18 was 14.4; level obtained 7/19 appears to have been obtained after infusion begun--would repeat prior to tomorrow's AM dose  - continue rifampin 600mg IV q24h (previously reported GI upset with this agent and also had transaminitis); monitor daily CMP    Dr. Casillas/ID Team 2 to resume care Monday 7/20
66y/o male with multiple severe comorbidities s/p right knee explant and static spacer for chronic MRSA PJI; also with nonhealing diabetic ulcer left leg    - Wound care with hydrogel + kerlix wet to dry  - No vascular intervention for now  - Continue with follow up at the office once discharged   - discussed with Chief on call
A/P: 65M s/p right knee arthroscopic I+D 7/17, right knee explant and abx spacer 7/22, now with debridement of LLE wound with wound vac placement on 7/30.  - Vac changes on Monday and Thursday for 3 weeks post-op  - Pain Control  - DVT ppx
A/P: 65M s/p right knee arthroscopic I+D 7/17, right knee explant and abx spacer 7/22, now with debridement of LLE wound with wound vac placement on 7/30.  - Vac changes on Monday and Thursday for 3 weeks post-op, will change today  - Pain Control  - DVT ppx
A/P: 65yMale s/p R total knee explant, antibiotic spacer 7/22 - LLE non healing wound on lateral mid tibia with exposed bone.    - Wound care with hydrogel on the wound   - No vascular intervention for now  - Continue with follow up at the office once discharged   - discussed with Chief on call  - call /[phone] with questions
IMPRESSION:  MRSA bacteremia with prosthetic knee infection s/p explantation.  Clinically stable with negative cultures since 7/19    Recommend:  1.  Continue Vancomycin 1 gram IV q12hrs.  Check trough before the next dose if patient remains in house.  Goal trough is 14-17  2.  Continue rifampin 300 mg PO q12hrs  3.  Recommend 6 weeks of antibiotics (day 1 = day of last surgery = 7/22/20; today is day 6  4.  Needs weekly CBC, CMP, ESR, CRP, vanco trough  5.  Can follow up with Dr. Casillas as outpatient:    [address] [place] [address], 4th floor  [place], [place]  [phone], option 2  fax:  [phone]    ID team 2 will sign off.  Reconsult as needed
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 Continue Vancomycin 1 gram IV q12hrs.  Check trough prior to today's afternoon dose.  Goal is 14-17  2.  Continue rifampin 300 mg PO q12  3.  Follow up OR cultures   4.  Would defer PICC until at least Monday given low grade fever last night.  If febrile > 100.4, please check surveillance blood cultures       ID team 2 will follow.  Dr. Marta Ruffin will cover the service tonight and this weekend.  I will return 7/27
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  Isolate has same antibiogram as isolate in 11/2019, likely persistence.  TTE showed only mild valvular disease, he had a clear source and he will need prolonged course of IV antibiotics, regardless - would not do JOHN PAUL at this time unless more blood cultures are positive.  Though his vancomycin level was now in therapeutic range, the morning dose on 7/19 had been held for supratherapeutic trough and he was restarted at the same dose - will be too high.  Suggest:  - Continue to f/u blood cultures from 7/19.  Can insert PICC on 7/22 if neg  - Decrease vancomycin to 1 g IV q12h - repeat trough after 3rd administration at that dose  - Please change rifampin to 300 mg po q12h  Recommendations discussed with primary team.  Will follow with you - team 1.
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  He is now s/p arthroscopic I&D in view of prior difficulties with his incision/wound and other co-morbidities.  Unfortunately, he was on cipro while this occurred - no organisms seen on initial gram stain.  Although infection at present may represent persistence from November (MRSA), cannot presume, particularly in view of ST compromise.  He has had extensive antibiotic exposure in the interim and has spent a long time on inpatient services.  He had transaminitis with rifampin in past - would not attempt to add unless Staph grows.   8 day s/p right knee arthroscopic I and D, 7/17/2020  3 days s/p right knee explant, 7/22/2020
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  He is now s/p arthroscopic I&D in view of prior difficulties with his incision/wound and other co-morbidities.  Unfortunately, he was on cipro while this occurred - no organisms seen on initial gram stain.  Although infection at present may represent persistence from November (MRSA), cannot presume, particularly in view of ST compromise.  He has had extensive antibiotic exposure in the interim and has spent a long time on inpatient services.  He had transaminitis with rifampin in past - would not attempt to add unless Staph grows.   s/p right knee arthroscopic I and D, 7/17/2020  s/p right knee explant, 7/22/2020  s/p LLE wound debridement, application wound vac, 7/30/2020

## 2020-08-10 NOTE — PROGRESS NOTE ADULT - PROBLEM SELECTOR PROBLEM 3
Type 1 diabetes mellitus without complication
Postoperative examination
Type 1 diabetes mellitus without complication
Postoperative examination
Type 1 diabetes mellitus without complication

## 2020-08-12 DIAGNOSIS — E11.622 TYPE 2 DIABETES MELLITUS WITH OTHER SKIN ULCER: ICD-10-CM

## 2020-08-12 DIAGNOSIS — E78.5 HYPERLIPIDEMIA, UNSPECIFIED: ICD-10-CM

## 2020-08-12 DIAGNOSIS — L97.929 NON-PRESSURE CHRONIC ULCER OF UNSPECIFIED PART OF LEFT LOWER LEG WITH UNSPECIFIED SEVERITY: ICD-10-CM

## 2020-08-12 DIAGNOSIS — I27.20 PULMONARY HYPERTENSION, UNSPECIFIED: ICD-10-CM

## 2020-08-12 DIAGNOSIS — M65.9 SYNOVITIS AND TENOSYNOVITIS, UNSPECIFIED: ICD-10-CM

## 2020-08-12 DIAGNOSIS — Z95.1 PRESENCE OF AORTOCORONARY BYPASS GRAFT: ICD-10-CM

## 2020-08-12 DIAGNOSIS — R62.7 ADULT FAILURE TO THRIVE: ICD-10-CM

## 2020-08-12 DIAGNOSIS — R52 PAIN, UNSPECIFIED: ICD-10-CM

## 2020-08-12 DIAGNOSIS — A41.9 SEPSIS, UNSPECIFIED ORGANISM: ICD-10-CM

## 2020-08-12 DIAGNOSIS — M00.9 PYOGENIC ARTHRITIS, UNSPECIFIED: ICD-10-CM

## 2020-08-12 DIAGNOSIS — I25.2 OLD MYOCARDIAL INFARCTION: ICD-10-CM

## 2020-08-12 DIAGNOSIS — D62 ACUTE POSTHEMORRHAGIC ANEMIA: ICD-10-CM

## 2020-08-12 DIAGNOSIS — Z96.41 PRESENCE OF INSULIN PUMP (EXTERNAL) (INTERNAL): ICD-10-CM

## 2020-08-12 DIAGNOSIS — K90.0 CELIAC DISEASE: ICD-10-CM

## 2020-08-12 DIAGNOSIS — I50.22 CHRONIC SYSTOLIC (CONGESTIVE) HEART FAILURE: ICD-10-CM

## 2020-08-12 DIAGNOSIS — R41.0 DISORIENTATION, UNSPECIFIED: ICD-10-CM

## 2020-08-12 DIAGNOSIS — I11.0 HYPERTENSIVE HEART DISEASE WITH HEART FAILURE: ICD-10-CM

## 2020-08-12 DIAGNOSIS — I25.10 ATHEROSCLEROTIC HEART DISEASE OF NATIVE CORONARY ARTERY WITHOUT ANGINA PECTORIS: ICD-10-CM

## 2020-08-12 LAB
CULTURE RESULTS: NO GROWTH — SIGNIFICANT CHANGE UP
CULTURE RESULTS: SIGNIFICANT CHANGE UP
SPECIMEN SOURCE: SIGNIFICANT CHANGE UP
SPECIMEN SOURCE: SIGNIFICANT CHANGE UP

## 2020-08-17 RX ORDER — GABAPENTIN 400 MG/1
1 CAPSULE ORAL
Qty: 90 | Refills: 2
Start: 2020-08-17

## 2020-08-17 RX ORDER — HYDROMORPHONE HYDROCHLORIDE 2 MG/ML
1 INJECTION INTRAMUSCULAR; INTRAVENOUS; SUBCUTANEOUS
Qty: 40 | Refills: 0
Start: 2020-08-17

## 2020-08-19 ENCOUNTER — APPOINTMENT (OUTPATIENT)
Dept: ORTHOPEDIC SURGERY | Facility: CLINIC | Age: 65
End: 2020-08-19

## 2020-08-25 ENCOUNTER — APPOINTMENT (OUTPATIENT)
Dept: ORTHOPEDIC SURGERY | Facility: CLINIC | Age: 65
End: 2020-08-25
Payer: COMMERCIAL

## 2020-08-25 VITALS
WEIGHT: 183 LBS | DIASTOLIC BLOOD PRESSURE: 60 MMHG | HEART RATE: 67 BPM | OXYGEN SATURATION: 98 % | SYSTOLIC BLOOD PRESSURE: 100 MMHG | BODY MASS INDEX: 24.14 KG/M2

## 2020-08-25 VITALS — TEMPERATURE: 98.1 F

## 2020-08-25 DIAGNOSIS — L08.9 NON-PRESSURE CHRONIC ULCER OF SKIN OF OTHER SITES WITH UNSPECIFIED SEVERITY: ICD-10-CM

## 2020-08-25 DIAGNOSIS — L98.499 NON-PRESSURE CHRONIC ULCER OF SKIN OF OTHER SITES WITH UNSPECIFIED SEVERITY: ICD-10-CM

## 2020-08-25 PROCEDURE — 99024 POSTOP FOLLOW-UP VISIT: CPT

## 2020-08-25 NOTE — HISTORY OF PRESENT ILLNESS
[de-identified] : Reports that he is having a lot of social difficulties at home. He's still struggling to mobilize with a walker secondary to right distal shin pain. Home PT will finish soon. He is receiving his antibiotics on schedule. He hasn't yet scheduled his first followup appointment with ID. He admits that he removed all his own staples. He says he has been doing wet to dry dressings for the left leg wound and Silvadene gauze dressings for the right knee wound. He has developed purulent drainage from the old biliary drainage site in the right upper abdomen, which he has been managing with gauze dressings. He denies systemic fever/chills. [de-identified] : s/p right knee component explanation and application static antibiotic cement spacer, surgical date 7/22/2020 and debridement of bone of left anterior tibia with application of Integra with Dr. Bowen, surgical date 7/30/2020 [de-identified] : Right knee wound with dry eschar and moist fibrinous bed over patella, slightly contracted compared to when I last saw him, about 3x5cm. No surrounding erythema, no active drainage. Remainder of incision well healed. Moving ankle and toes. \par \par Left leg wound with Integra in place. Exposed tibia noted, about 8mm x 15mm in inferior half of wound. Active serous drainage. Slight surrounding erythema around wound bed without fluctuance.  [de-identified] : 66y/o male about 4 weeks s/p right knee explant and static antibiotic cement spacer for MRSA PJI; also about 3 weeks s/p dermal substitute coverage of nonhealing left leg diabetic ulcer [de-identified] : Most recent labs 8/18/20: WESR 120, CRP 3.1. Vanc trough 9.0. [de-identified] : I had another long discussion with him regarding the severity of this issue. He has had persistent problems with home nursing regarding wound management. I get conflicting stories from him and the nurses. He does admit being noncompliant with wound care (e.g., removing his own staples). I don't think the wound vac is worth pursuing any further. The Integra appears to be failing. I called Dr. Bowen, who will see the patient this afternoon in his office for further evaluation of the left leg. \par \par Right knee wound appears stable but the inflammatory markers are still very high. Will continue trending for now; will also follow up with ID regarding the subtherapeutic vanco trough. This may require spacer to spacer exchange if no improvement over the next 2-4 weeks; may also require removal of the ipsilateral Gamma nail.\par \par See me again in 2 weeks with right femur, knee, tibia x-rays.

## 2020-09-01 ENCOUNTER — RESULT REVIEW (OUTPATIENT)
Age: 65
End: 2020-09-01

## 2020-09-01 ENCOUNTER — APPOINTMENT (OUTPATIENT)
Dept: SURGERY | Facility: CLINIC | Age: 65
End: 2020-09-01

## 2020-09-01 ENCOUNTER — APPOINTMENT (OUTPATIENT)
Dept: INFECTIOUS DISEASE | Facility: CLINIC | Age: 65
End: 2020-09-01
Payer: COMMERCIAL

## 2020-09-01 ENCOUNTER — INPATIENT (INPATIENT)
Facility: HOSPITAL | Age: 65
LOS: 31 days | Discharge: NURSING, REHAB OR LONG-TERM CARE | DRG: 463 | End: 2020-10-03
Attending: ORTHOPAEDIC SURGERY | Admitting: ORTHOPAEDIC SURGERY
Payer: COMMERCIAL

## 2020-09-01 VITALS
TEMPERATURE: 98 F | DIASTOLIC BLOOD PRESSURE: 56 MMHG | OXYGEN SATURATION: 99 % | HEART RATE: 88 BPM | WEIGHT: 184.97 LBS | HEIGHT: 74 IN | SYSTOLIC BLOOD PRESSURE: 98 MMHG | RESPIRATION RATE: 18 BRPM

## 2020-09-01 DIAGNOSIS — I50.9 HEART FAILURE, UNSPECIFIED: ICD-10-CM

## 2020-09-01 DIAGNOSIS — Z96.651 PRESENCE OF RIGHT ARTIFICIAL KNEE JOINT: Chronic | ICD-10-CM

## 2020-09-01 DIAGNOSIS — Z98.890 OTHER SPECIFIED POSTPROCEDURAL STATES: Chronic | ICD-10-CM

## 2020-09-01 DIAGNOSIS — Z95.5 PRESENCE OF CORONARY ANGIOPLASTY IMPLANT AND GRAFT: Chronic | ICD-10-CM

## 2020-09-01 DIAGNOSIS — M25.561 PAIN IN RIGHT KNEE: ICD-10-CM

## 2020-09-01 DIAGNOSIS — I25.10 ATHEROSCLEROTIC HEART DISEASE OF NATIVE CORONARY ARTERY WITHOUT ANGINA PECTORIS: ICD-10-CM

## 2020-09-01 DIAGNOSIS — I10 ESSENTIAL (PRIMARY) HYPERTENSION: ICD-10-CM

## 2020-09-01 DIAGNOSIS — E78.5 HYPERLIPIDEMIA, UNSPECIFIED: ICD-10-CM

## 2020-09-01 DIAGNOSIS — Z41.9 ENCOUNTER FOR PROCEDURE FOR PURPOSES OTHER THAN REMEDYING HEALTH STATE, UNSPECIFIED: Chronic | ICD-10-CM

## 2020-09-01 DIAGNOSIS — S81.802A UNSPECIFIED OPEN WOUND, LEFT LOWER LEG, INITIAL ENCOUNTER: ICD-10-CM

## 2020-09-01 DIAGNOSIS — E11.9 TYPE 2 DIABETES MELLITUS WITHOUT COMPLICATIONS: ICD-10-CM

## 2020-09-01 DIAGNOSIS — E11.621 TYPE 2 DIABETES MELLITUS WITH FOOT ULCER: ICD-10-CM

## 2020-09-01 DIAGNOSIS — L97.529 TYPE 2 DIABETES MELLITUS WITH FOOT ULCER: ICD-10-CM

## 2020-09-01 DIAGNOSIS — Z95.1 PRESENCE OF AORTOCORONARY BYPASS GRAFT: Chronic | ICD-10-CM

## 2020-09-01 LAB
ALBUMIN SERPL ELPH-MCNC: 3.6 G/DL — SIGNIFICANT CHANGE UP (ref 3.3–5)
ALBUMIN SERPL ELPH-MCNC: 3.7 G/DL — SIGNIFICANT CHANGE UP (ref 3.3–5)
ALP SERPL-CCNC: 176 U/L — HIGH (ref 40–120)
ALP SERPL-CCNC: 179 U/L — HIGH (ref 40–120)
ALT FLD-CCNC: 13 U/L — SIGNIFICANT CHANGE UP (ref 10–45)
ALT FLD-CCNC: 15 U/L — SIGNIFICANT CHANGE UP (ref 10–45)
ANION GAP SERPL CALC-SCNC: 10 MMOL/L — SIGNIFICANT CHANGE UP (ref 5–17)
ANION GAP SERPL CALC-SCNC: 11 MMOL/L — SIGNIFICANT CHANGE UP (ref 5–17)
ANISOCYTOSIS BLD QL: SLIGHT — SIGNIFICANT CHANGE UP
APTT BLD: 25.4 SEC — LOW (ref 27.5–35.5)
AST SERPL-CCNC: 15 U/L — SIGNIFICANT CHANGE UP (ref 10–40)
AST SERPL-CCNC: 17 U/L — SIGNIFICANT CHANGE UP (ref 10–40)
BASOPHILS # BLD AUTO: 0.17 K/UL — SIGNIFICANT CHANGE UP (ref 0–0.2)
BASOPHILS NFR BLD AUTO: 1.7 % — SIGNIFICANT CHANGE UP (ref 0–2)
BILIRUB SERPL-MCNC: 0.4 MG/DL — SIGNIFICANT CHANGE UP (ref 0.2–1.2)
BILIRUB SERPL-MCNC: 0.5 MG/DL — SIGNIFICANT CHANGE UP (ref 0.2–1.2)
BUN SERPL-MCNC: 28 MG/DL — HIGH (ref 7–23)
BUN SERPL-MCNC: 30 MG/DL — HIGH (ref 7–23)
CALCIUM SERPL-MCNC: 9 MG/DL — SIGNIFICANT CHANGE UP (ref 8.4–10.5)
CALCIUM SERPL-MCNC: 9 MG/DL — SIGNIFICANT CHANGE UP (ref 8.4–10.5)
CHLORIDE SERPL-SCNC: 97 MMOL/L — SIGNIFICANT CHANGE UP (ref 96–108)
CHLORIDE SERPL-SCNC: 98 MMOL/L — SIGNIFICANT CHANGE UP (ref 96–108)
CO2 SERPL-SCNC: 25 MMOL/L — SIGNIFICANT CHANGE UP (ref 22–31)
CO2 SERPL-SCNC: 27 MMOL/L — SIGNIFICANT CHANGE UP (ref 22–31)
CREAT SERPL-MCNC: 1.07 MG/DL — SIGNIFICANT CHANGE UP (ref 0.5–1.3)
CREAT SERPL-MCNC: 1.1 MG/DL — SIGNIFICANT CHANGE UP (ref 0.5–1.3)
EOSINOPHIL # BLD AUTO: 0.79 K/UL — HIGH (ref 0–0.5)
EOSINOPHIL NFR BLD AUTO: 7.9 % — HIGH (ref 0–6)
GIANT PLATELETS BLD QL SMEAR: PRESENT — SIGNIFICANT CHANGE UP
GLUCOSE BLDC GLUCOMTR-MCNC: 261 MG/DL — HIGH (ref 70–99)
GLUCOSE SERPL-MCNC: 184 MG/DL — HIGH (ref 70–99)
GLUCOSE SERPL-MCNC: 202 MG/DL — HIGH (ref 70–99)
HCT VFR BLD CALC: 29.7 % — LOW (ref 39–50)
HGB BLD-MCNC: 8.5 G/DL — LOW (ref 13–17)
HYPOCHROMIA BLD QL: SIGNIFICANT CHANGE UP
INR BLD: 1.43 — HIGH (ref 0.88–1.16)
LACTATE SERPL-SCNC: 1.5 MMOL/L — SIGNIFICANT CHANGE UP (ref 0.5–2)
LYMPHOCYTES # BLD AUTO: 0.79 K/UL — LOW (ref 1–3.3)
LYMPHOCYTES # BLD AUTO: 7.9 % — LOW (ref 13–44)
MACROCYTES BLD QL: SLIGHT — SIGNIFICANT CHANGE UP
MANUAL SMEAR VERIFICATION: SIGNIFICANT CHANGE UP
MCHC RBC-ENTMCNC: 20.2 PG — LOW (ref 27–34)
MCHC RBC-ENTMCNC: 28.6 GM/DL — LOW (ref 32–36)
MCV RBC AUTO: 70.7 FL — LOW (ref 80–100)
MICROCYTES BLD QL: SLIGHT — SIGNIFICANT CHANGE UP
MONOCYTES # BLD AUTO: 0.88 K/UL — SIGNIFICANT CHANGE UP (ref 0–0.9)
MONOCYTES NFR BLD AUTO: 8.8 % — SIGNIFICANT CHANGE UP (ref 2–14)
NEUTROPHILS # BLD AUTO: 7.38 K/UL — SIGNIFICANT CHANGE UP (ref 1.8–7.4)
NEUTROPHILS NFR BLD AUTO: 71.9 % — SIGNIFICANT CHANGE UP (ref 43–77)
NEUTS BAND # BLD: 1.8 % — SIGNIFICANT CHANGE UP (ref 0–8)
OVALOCYTES BLD QL SMEAR: SLIGHT — SIGNIFICANT CHANGE UP
PLAT MORPH BLD: NORMAL — SIGNIFICANT CHANGE UP
PLATELET # BLD AUTO: 428 K/UL — HIGH (ref 150–400)
POLYCHROMASIA BLD QL SMEAR: SLIGHT — SIGNIFICANT CHANGE UP
POTASSIUM SERPL-MCNC: 4.5 MMOL/L — SIGNIFICANT CHANGE UP (ref 3.5–5.3)
POTASSIUM SERPL-MCNC: 4.7 MMOL/L — SIGNIFICANT CHANGE UP (ref 3.5–5.3)
POTASSIUM SERPL-SCNC: 4.5 MMOL/L — SIGNIFICANT CHANGE UP (ref 3.5–5.3)
POTASSIUM SERPL-SCNC: 4.7 MMOL/L — SIGNIFICANT CHANGE UP (ref 3.5–5.3)
PROT SERPL-MCNC: 7.4 G/DL — SIGNIFICANT CHANGE UP (ref 6–8.3)
PROT SERPL-MCNC: 7.9 G/DL — SIGNIFICANT CHANGE UP (ref 6–8.3)
PROTHROM AB SERPL-ACNC: 16.9 SEC — HIGH (ref 10.6–13.6)
RBC # BLD: 4.2 M/UL — SIGNIFICANT CHANGE UP (ref 4.2–5.8)
RBC # FLD: 19.3 % — HIGH (ref 10.3–14.5)
RBC BLD AUTO: ABNORMAL
SARS-COV-2 RNA SPEC QL NAA+PROBE: SIGNIFICANT CHANGE UP
SODIUM SERPL-SCNC: 133 MMOL/L — LOW (ref 135–145)
SODIUM SERPL-SCNC: 135 MMOL/L — SIGNIFICANT CHANGE UP (ref 135–145)
SYNOVIAL CRYSTALS CLARITY: ABNORMAL
SYNOVIAL CRYSTALS COLOR: ABNORMAL
SYNOVIAL CRYSTALS ID: SIGNIFICANT CHANGE UP
SYNOVIAL CRYSTALS TUBE: SIGNIFICANT CHANGE UP
TARGETS BLD QL SMEAR: SLIGHT — SIGNIFICANT CHANGE UP
WBC # BLD: 10.02 K/UL — SIGNIFICANT CHANGE UP (ref 3.8–10.5)
WBC # FLD AUTO: 10.02 K/UL — SIGNIFICANT CHANGE UP (ref 3.8–10.5)

## 2020-09-01 PROCEDURE — 99214 OFFICE O/P EST MOD 30 MIN: CPT | Mod: 95

## 2020-09-01 PROCEDURE — 73560 X-RAY EXAM OF KNEE 1 OR 2: CPT | Mod: 26,RT

## 2020-09-01 PROCEDURE — 88304 TISSUE EXAM BY PATHOLOGIST: CPT | Mod: 26

## 2020-09-01 PROCEDURE — 99285 EMERGENCY DEPT VISIT HI MDM: CPT

## 2020-09-01 RX ORDER — DEXTROSE 50 % IN WATER 50 %
12.5 SYRINGE (ML) INTRAVENOUS ONCE
Refills: 0 | Status: DISCONTINUED | OUTPATIENT
Start: 2020-09-01 | End: 2020-09-04

## 2020-09-01 RX ORDER — CIPROFLOXACIN HYDROCHLORIDE 500 MG/1
500 TABLET, FILM COATED ORAL
Qty: 20 | Refills: 0 | Status: COMPLETED | COMMUNITY
Start: 2020-07-10 | End: 2020-09-01

## 2020-09-01 RX ORDER — DULOXETINE HYDROCHLORIDE 30 MG/1
30 CAPSULE, DELAYED RELEASE ORAL DAILY
Refills: 0 | Status: DISCONTINUED | OUTPATIENT
Start: 2020-09-01 | End: 2020-09-02

## 2020-09-01 RX ORDER — PANTOPRAZOLE SODIUM 20 MG/1
40 TABLET, DELAYED RELEASE ORAL
Refills: 0 | Status: DISCONTINUED | OUTPATIENT
Start: 2020-09-01 | End: 2020-09-04

## 2020-09-01 RX ORDER — OXYCODONE HYDROCHLORIDE 5 MG/1
10 TABLET ORAL EVERY 12 HOURS
Refills: 0 | Status: DISCONTINUED | OUTPATIENT
Start: 2020-09-01 | End: 2020-09-02

## 2020-09-01 RX ORDER — ATORVASTATIN CALCIUM 80 MG/1
40 TABLET, FILM COATED ORAL AT BEDTIME
Refills: 0 | Status: DISCONTINUED | OUTPATIENT
Start: 2020-09-01 | End: 2020-09-03

## 2020-09-01 RX ORDER — PRASUGREL 5 MG/1
10 TABLET, FILM COATED ORAL DAILY
Refills: 0 | Status: DISCONTINUED | OUTPATIENT
Start: 2020-09-02 | End: 2020-09-03

## 2020-09-01 RX ORDER — ACETAMINOPHEN 500 MG
650 TABLET ORAL EVERY 6 HOURS
Refills: 0 | Status: DISCONTINUED | OUTPATIENT
Start: 2020-09-01 | End: 2020-09-04

## 2020-09-01 RX ORDER — ASPIRIN/CALCIUM CARB/MAGNESIUM 324 MG
81 TABLET ORAL
Refills: 0 | Status: DISCONTINUED | OUTPATIENT
Start: 2020-09-01 | End: 2020-09-03

## 2020-09-01 RX ORDER — SPIRONOLACTONE 25 MG/1
25 TABLET, FILM COATED ORAL
Refills: 0 | Status: DISCONTINUED | OUTPATIENT
Start: 2020-09-01 | End: 2020-09-01

## 2020-09-01 RX ORDER — TAMSULOSIN HYDROCHLORIDE 0.4 MG/1
0.4 CAPSULE ORAL AT BEDTIME
Refills: 0 | Status: DISCONTINUED | OUTPATIENT
Start: 2020-09-01 | End: 2020-09-04

## 2020-09-01 RX ORDER — INSULIN LISPRO 100/ML
VIAL (ML) SUBCUTANEOUS
Refills: 0 | Status: DISCONTINUED | OUTPATIENT
Start: 2020-09-01 | End: 2020-09-04

## 2020-09-01 RX ORDER — OXYCODONE HYDROCHLORIDE 5 MG/1
10 TABLET ORAL EVERY 4 HOURS
Refills: 0 | Status: DISCONTINUED | OUTPATIENT
Start: 2020-09-01 | End: 2020-09-02

## 2020-09-01 RX ORDER — GLUCAGON INJECTION, SOLUTION 0.5 MG/.1ML
1 INJECTION, SOLUTION SUBCUTANEOUS ONCE
Refills: 0 | Status: DISCONTINUED | OUTPATIENT
Start: 2020-09-01 | End: 2020-09-04

## 2020-09-01 RX ORDER — AMOXICILLIN AND CLAVULANATE POTASSIUM 875; 125 MG/1; MG/1
875-125 TABLET, COATED ORAL
Qty: 14 | Refills: 0 | Status: COMPLETED | COMMUNITY
Start: 2020-06-19 | End: 2020-09-01

## 2020-09-01 RX ORDER — TAMSULOSIN HYDROCHLORIDE 0.4 MG/1
0.4 CAPSULE ORAL AT BEDTIME
Refills: 0 | Status: DISCONTINUED | OUTPATIENT
Start: 2020-09-01 | End: 2020-09-01

## 2020-09-01 RX ORDER — SPIRONOLACTONE 25 MG/1
25 TABLET, FILM COATED ORAL
Refills: 0 | Status: DISCONTINUED | OUTPATIENT
Start: 2020-09-01 | End: 2020-09-03

## 2020-09-01 RX ORDER — VANCOMYCIN HCL 1 G
1000 VIAL (EA) INTRAVENOUS EVERY 12 HOURS
Refills: 0 | Status: DISCONTINUED | OUTPATIENT
Start: 2020-09-01 | End: 2020-09-03

## 2020-09-01 RX ORDER — DEXTROSE 50 % IN WATER 50 %
15 SYRINGE (ML) INTRAVENOUS ONCE
Refills: 0 | Status: DISCONTINUED | OUTPATIENT
Start: 2020-09-01 | End: 2020-09-04

## 2020-09-01 RX ORDER — SODIUM CHLORIDE 9 MG/ML
1000 INJECTION, SOLUTION INTRAVENOUS
Refills: 0 | Status: DISCONTINUED | OUTPATIENT
Start: 2020-09-01 | End: 2020-09-04

## 2020-09-01 RX ORDER — METOPROLOL TARTRATE 50 MG
25 TABLET ORAL DAILY
Refills: 0 | Status: DISCONTINUED | OUTPATIENT
Start: 2020-09-01 | End: 2020-09-04

## 2020-09-01 RX ORDER — HYDROMORPHONE HYDROCHLORIDE 2 MG/ML
1 INJECTION INTRAMUSCULAR; INTRAVENOUS; SUBCUTANEOUS ONCE
Refills: 0 | Status: DISCONTINUED | OUTPATIENT
Start: 2020-09-01 | End: 2020-09-01

## 2020-09-01 RX ORDER — DEXTROSE 50 % IN WATER 50 %
25 SYRINGE (ML) INTRAVENOUS ONCE
Refills: 0 | Status: DISCONTINUED | OUTPATIENT
Start: 2020-09-01 | End: 2020-09-04

## 2020-09-01 RX ORDER — CELECOXIB 200 MG/1
200 CAPSULE ORAL
Refills: 0 | Status: DISCONTINUED | OUTPATIENT
Start: 2020-09-01 | End: 2020-09-02

## 2020-09-01 RX ADMIN — Medication 250 MILLIGRAM(S): at 21:21

## 2020-09-01 RX ADMIN — HYDROMORPHONE HYDROCHLORIDE 1 MILLIGRAM(S): 2 INJECTION INTRAMUSCULAR; INTRAVENOUS; SUBCUTANEOUS at 18:19

## 2020-09-01 RX ADMIN — Medication 6: at 23:03

## 2020-09-01 RX ADMIN — HYDROMORPHONE HYDROCHLORIDE 1 MILLIGRAM(S): 2 INJECTION INTRAMUSCULAR; INTRAVENOUS; SUBCUTANEOUS at 17:58

## 2020-09-01 RX ADMIN — ATORVASTATIN CALCIUM 40 MILLIGRAM(S): 80 TABLET, FILM COATED ORAL at 21:20

## 2020-09-01 NOTE — ED PROVIDER NOTE - PHYSICAL EXAMINATION
R knee: ~2cm open wound w/ granulation tissue, minimal surrounding erythema. +warmth. unable to flex knee (this is his baseline). +Mild knee swelling. R knee: ~2cm open wound w/ granulation tissue, minimal surrounding erythema. +warmth. unable to flex knee (this is his baseline). +Mild knee swelling.    LLE: larger anterior shin ulcer, no surrounding erythema/warmth  RUE PICc.

## 2020-09-01 NOTE — H&P ADULT - HISTORY OF PRESENT ILLNESS
Juanito Blas is a 64y/o M with PMHx of CAD (s/p CABG 2018), CHF (EF 25%) Diabetes, HLD, HTN, R Revision Total Knee Arthroplasty with Antibiotic Spacer on 7/22 by Dr. Castro for Prosthetic Joint Infection, presents with 2-3 days of worsening R knee pain and swelling. Pt denies any trauma to R knee. Pt denies any numbness/tingling. Notes minimal drainage from incision, no purulence. Pt denies any recent illness. Pt was discharged last hospital visit 3 weeks ago after multiple revision knee surgeries complicated by prosthetic joint infection and bacteremia. Sent home with PICC line, getting vancomycin 1000 Q12 and Rifampin 300 Q12 daily. Reports compliance with medication. Denies any chest pain/SOB/fevers/chills.     Pt also has chronic large skin defect on L anterior tibia, which was managed by vascular and plastic surgery last visit. Only reports mild pain over this wound, denies any drainage, trauma.

## 2020-09-01 NOTE — ED PROVIDER NOTE - CLINICAL SUMMARY MEDICAL DECISION MAKING FREE TEXT BOX
Anesthesia Pre Eval Note    Anesthesia ROS/Med Hx    Overall Review:  EKG was reviewed       Pulmonary Review:    Negative for sleep apnea     Cardiovascular Review:    Positive for hypertension  Positive for hyperlipidemia    End/Other Review:    Positive for hypothyroidism      Relevant Problems   No relevant active problems       Physical Exam     Airway   Mallampati: II  TM Distance: >3 FB  Neck ROM: Full  Neck: Non-tender  TMJ Mobility: Good    Cardiovascular  Cardiovascular exam normal    Head Assessment  Head assessment: Normocephalic and Atraumatic    General Assessment  General Assessment: Alert and oriented and No acute distress    Dental Exam  Dental exam normal    Pulmonary Exam  Pulmonary exam normal    Abdominal Exam    Patient Demonstrates:  Obese      Anesthesia Plan    ASA Status: 3    Anesthesia Type: General    Induction: Intravenous  Preferred Airway Type: ETT  Maintenance: Combined  Premedication: IV      Post-op Pain Management: Single Shot Block      Checklist  Reviewed: Lab Results, Allergies, EKG, Consultations, Medications, Patient Summary, Problem list, NPO Status and Past Med History    Informed Consent  The proposed anesthetic plan, including its risks and benefits, have been discussed with the Patient and Spouse  - along with the risks and benefits of alternatives.  Questions were encouraged and answered and the patient and/or representative understands and agrees to proceed.     Blood Products: Not Anticipated    Comments  Plan Comments: R/B of general anesthesia discussed with patient including but not limited to cardiac complications, respiratory complications, CNS complications, nausea and vomiting, sore throat, and dental injury. Questions answered and patient wishes to proceed.  Fem n block explained. Possible complications d/w pt and family, includin but not limited to inection, bleeding and nerve injury.  They agree to proceed.     65M PMh HTN, HLD, DM, CAD, CHF, AICD, pHTN, recurrent R knee infections, p/w R knee pain/swelling. Pt dc'd ~3w ago after admission for presumed R septic knee. Currently on rifampin and vanc via RUE PICC. Pt has been feeling like usual self up until this morning when he developed atraumatic R knee pain/warmth/swelling. Pt has improving LLE chronic wound. No other systemic symptoms. Vitals wnl, exam as above.  ddx: Concern for recurrent knee infection.  d/w ortho.  Will check labs, XR, reassess.

## 2020-09-01 NOTE — ED PROVIDER NOTE - OBJECTIVE STATEMENT
65M PMh HTN, HLD, DM, CAD, CHF, AICD, pHTN, recurrent R knee infections, p/w R knee pain/swelling. Pt dc'd ~3w ago after admission for presumed R septic knee. Currently on rifampin and vanc via RUE PICC. Pt has been feeling like usual self up until this morning when he developed atraumatic R knee pain/warmth/swelling. Pt has improving LLE chronic wound. Denies f/c, SOB/CP, NVD, abd pain, urinary complaints, focal weakness/numbness.

## 2020-09-01 NOTE — H&P ADULT - NSHPPHYSICALEXAM_GEN_ALL_CORE
General: AAOx3, NAD, Well-appearing, calm cooperative    RLE: Old incision from revision TKA over anterior knee healing well, with the exception of a central 2 x 2 cm area of granulation and opening.  Minimal serous drainage noted on wound, no drainage expressible  Minimally warm to touch, no erythema. Moderately swollen throughout knee without palpable effusion. Skin taut  SILT distally SPN/DPN/Saphenous/Sural/Tib  Motor 5/5 TA/GS/EHL/Psoas. Unable to flex/extend knee 2/2 static antibiotic spacer  DP pulse 2+ distally    LLE: Large 5x5 cm wound appears to be healing with skin graft incorporation  No expressible purulence or drainage noted  SILT distally SPN/DPN/Saphenous/Sural/Tib  Motor 5/5 TA/GS/EHL/Psoas/Quad  DP pulse 2+ distally

## 2020-09-01 NOTE — DATA REVIEWED
[FreeTextEntry1] : 8/31: WBC 10.25, , CRP 3.02, vanco trough pending\par 8/26: WBC 9.71 ESR 91, CRP 3.16, vanco trough 15.8 \par

## 2020-09-01 NOTE — ED PROVIDER NOTE - PROGRESS NOTE DETAILS
[place]: ESR pending. elevated crp, mild hyperglycemia, other labs grossly at baseline. d/w dr. gonzalez, will admit for further care.

## 2020-09-01 NOTE — H&P ADULT - ASSESSMENT
66y/o M with PMHx of CAD (s/p CABG 2018), CHF (EF 25%) Diabetes, HLD, HTN, R Revision Total Knee Arthroplasty with Antibiotic Spacer on 7/22 by Dr. Castro for Prosthetic Joint Infection, presents with 2-3 days of worsening R knee pain and swelling

## 2020-09-01 NOTE — REASON FOR VISIT
[Post Hospitalization] : a post hospitalization visit [Home] : at home, [unfilled] , at the time of the visit. [Medical Office: ([facility])___] : at the medical office located in  [Other:____] : [unfilled]

## 2020-09-01 NOTE — ED ADULT NURSE NOTE - OBJECTIVE STATEMENT
65 y.o M a&ox4 wheeled in from triage c.o knee pain. Pt reports having knee replacement to R  knee then recurrent knee infections. d/c from [facility] on aug 10th after admission for septic knee. PICC to [facility] for IV abx for L lower leg wound. PT came in today due to R knee pain, swellign, and hoht to the [facility]. unabel to bend knee. denies fevers, chills. PMH of HTN, HLD, DM, CAD, CHF, AICD, pHTN, recurrent R knee infections, p

## 2020-09-01 NOTE — H&P ADULT - ATTENDING COMMENTS
Patient seen by me last night. 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  He underwent robotic-assisted R TKR on 10/21/19 without complication. He was readmitted on 11/22/19 with dehiscence of his incision. He had been started on TMP/SX 1 week earlier without improvement. On 11/22, he underwent I&D and polyethylene liner exchange. OR cultures grew MRSA (S vanc 1.5, clinda, dapto, linezolid, rif and tet/doxy). He was treated with vancomycin and rifampin. His LFTs became elevated, rifampin was held. He was d/jovanni 11/26 to complete 6 weeks of vancomycin. While on therapy, he was admitted for CHF exacerbation (12/12-12/16). Rifampin was restarted around the d/c but he again was admitted on 12/21 for necrotic R TKA wound. He was seen by Plastics and immobilized. He was d/jovanni on vanc/rifampin on 12/27. He was transitioned from vancomycin to doxy on 1/15. On 1/16, he underwent wound debridement of skin and subcutaneous tissue/fascia by Vascular. No pus was identified, a wound vac was placed and he was admitted. ID recommended two week course of doxy. He was d/jovanni on 1/18. On 1/31, he underwent ORIF R hip for displaced R intertrochanteric hip fracture at [facility]. He received perioperative cefazolin. During that admission, on 2/4, he underwent selective debridement of R knee wound with STSG from R thigh by Burn service. Debrided tissue was sent for culture and grew Serratia marcescens as well as E coli (both S ceftriaxone). He initially was started on amp/sulbactam. Changed to clinda X 10 d by ID. His course was c/b episode of delirium and urinary retention requiring Loja. He had non-sustained VT and underwent AICD placement with pip-tazo periop on 2/11, clinda was continued. On 2/12, clinda was d/jovanni and he was given 7 d course of TMP/SX to cover isolates in wound culture. He was d/jovanni to [facility] with Loja on 2/14 but readmitted to [facility] on 2/21 for AMS initially thought due to UTI. He was started on meropenem, which was d/jovanni on 2/22. Urine culture grew Klebsiella pneumonia (S cefazolin, R TMP/SX), blood cultures were negative. R knee wound had central eschar and eschar at border. He underwent bedside excisional debridement to dermis. He was started on ceftriaxone (for urine?). Course was c/b development of sepsis/elevated LFTs/HIDA c/c acute cholecystitis. Metronidazole was added. He underwent cholecystostomy tube placement by IR on 2/28. By 2/29, antibiotics were changed to meropenem. Bile culture did not grow. On 3/4, antibiotics were changed to levofloxacin, then moxifloxacin on 3/6 through 3/14. He also was found to have L 2nd toe ulcer with XRay on 3/16 c/c OM. He was seen by ID – recommended no Abx. No MRI done. He was d/jovanni to rehab on 3/19. He had I&D of LLE wound on 6/5. Culture sent in wrong media. He was treated with local care, with clinical worsening. On 6/19, he was started on amox/clav. On 6/22, he underwent I&D of LLE ulcer by Vascular. Bone culture did not grow. He was seen for f/u on 7/10 – wound was not healing – was started on cipro, planned as 10 d course. On 7/11, he began having R knee pain and inability to ambulate. He came to the ED on 7/16, where he had T 100.3. Swelling was noted about the knee. Aspirate was done - 195,100 nucleated cells with 98% PMNs. He was started on vanc and pip-tazo. On 7/17, he underwent arthroscopic I&D. Was found to have purulent joint fluid and inflammatory synovitis. Blood culture and knee aspiration grew MRSA. Pip-tazo was discontinued. Rifampin started and vancomycin continued. TTE/JOHN PAUL negative. On 7/22 he underwent debridement of right knee with explantation and spacer placement. PICC was placed 7/27. He had vascular wound on LLE s/p OR debridement of left anterior tibia and wound vac placement on 7/30. He was discharged 8/10 to complete 6 week course of rifampin 300 mg PO q 12 h and vancomycin 1 g IV q 12 h (7/22/20 – 9/1/20). After discharge he reported wound vac fell off multiple times. He declined to present to ED of f/u with Dr. Bowen (plastic surgery). He started doing wet to dry dressings at home. He followed up also with ID via telehealth and was found to have persistently highly elevated inflammatory markers, though he remained systemically non-toxic. He reported intermittent purulent drainage from the biliary drain site. Finally he developed increasing right knee pain during the day 9/1/20 and came into the ED for evaluation.    On exam, the right knee anterior wound has contracted compared to prior and has a fibrinous bed without active drainage. There is no fluctuance superficially or palpable effusion deep. The knee is fixed in near extension and stable. Hip incisions are healed and appear benign. He is able to move the hip and ankle painlessly. Neurologic status is compromised to the leg and foot per his baseline. Foot is cool and there is no palpable pulse.     Left leg anterior wound demonstrates further degeneration of the Integra flap with a bit more exposed tibia than on my last office visit with him about a week ago. There is mild surrounding erythema without fluctuance. There flap appears moist but there was no expressible drainage. Knee displays a small flexion contracture and crepitus with motion. Ankle and foot motion are intact. Neurovascular status is similar to the contralateral limb.    Labs show no leukocytosis; moderate anemia; persistently elevated inflammatory markers; kidneys ok    XRs show no fracture of the spacer or host bone of the right knee, unchanged from prior films    I aspirated the right knee and obtained only a few drops of serosanguinous fluid. This was insufficient for cell counts and so was only sent for cultures.    Imp: 64y/o male with multiple severe medical comorbidities about 6 weeks s/p right knee explantation and static antibiotic cement spacer for chronic MRSA PJI, presenting with acute right knee pain and systemic inflammation  - Knee aspiration will not be very revealing. Knee appears stable and the pain is now under control after he received medications.   - Will admit given that he has had extensive social difficulties at home and needs care from multiple providers urgently  - Maintain current vanc/rifampin  - Consult ID for followup  - Consult general surgery for the biliary fistula  - Consult plastics for the left leg  - We discussed further treatment of the right knee. I think it's possible that he may have ongoing septic arthritis and/or femoral osteomyelitis. He also has the issue of the poorly healing wound over the patella. May need to perform spacer revision this admission. Would consider removing the ipsilateral femoral nail at the same time. Knee fusion and amputation are other options, though he remains hopeful that amputation can be avoided.

## 2020-09-01 NOTE — ASSESSMENT
[FreeTextEntry1] : 66 yo M with HTN, hyperlipidemia, type II DM with peripheral neuropathy, CAD s/p MIDCAB (2018)/VERONICA, HFrEF, AICD (2/20), pulmonary HTN with recurrent R knee PPJI s/p arthroscopic I&D 7/17 and debridement with explantation and spacer placement 7/22.  MRSA grew in blood and from joint fluid.  Isolate had the same antibiogram as isolate in 11/2019, likely persistence. Also s/p OR debridement of left anterior tibia and wound vac placement on 7/30. Wound vac no longer in place. He is due to complete antibiotics today however inflammatory markers still significantly elevated.  Given recurrent infection, would rather treat with longer course. He reports missing several doses of vancomycin. \par Plan:\par - Continue vancomycin 1 g IV q 12 h x 2 additional weeks, tentative end date 9/15\par - Weekly CBC, CMP, ESR, CRP, vanco trough drawn by infusion company\par - Discussed case with Dr. Castro, plan for repeat knee aspiration done while on antibiotics \par - He was encouraged to keep appointment with general surgeon for eval of GB\par Follow up in 2 weeks.

## 2020-09-01 NOTE — HISTORY OF PRESENT ILLNESS
[FreeTextEntry1] : 65 year old male with HTN, hyperlipidemia, type II DM with peripheral neuropathy, CAD s/p MIDCAB (2018)/VERONICA, HFrEF, AICD (2/20), pulmonary HTN with recurrent R knee PPJI.\par He underwent robotic-assisted R TKR on 10/21/19 without complication.  He was readmitted on 11/22/19 with dehiscence of his incision.  He had been started on TMP/SX 1 week earlier without improvement.  On 11/22, he underwent I&D and polyethylene liner exchange.  OR cultures grew MRSA (S vanc 1.5, clinda, dapto, linezolid, rif and tet/doxy).  He was treated with vancomycin and rifampin.  His LFTs became elevated, rifampin was held.  He was d/jovanni 11/26 to complete 6 weeks of vancomycin.  While on therapy, he was admitted for CHF exacerbation (12/12-12/16).  Rifampin was restarted around the d/c but he again was admitted on 12/21 for necrotic R TKA wound.  He was seen by Plastics and immobilized.  He was d/jovanni on vanc/rifampin on 12/27.  He was transitioned from vancomycin to doxy on 1/15.  On 1/16, he underwent wound debridement of skin and subcutaneous tissue/fascia by Vascular.  No pus was identified, a wound vac was placed and he was admitted.  ID recommended two week course of doxy.  He was d/jovanni on 1/18.  On 1/31, he underwent ORIF R hip for displaced R intertrochanteric hip fracture at [facility].  He received perioperative cefazolin.  During that admission, on 2/4, he underwent selective debridement of R knee wound with STSG from R thigh by Burn service.  Debrided tissue was sent for culture and grew Serratia marcescens as well as E coli (both S ceftriaxone). He initially was started on amp/sulbactam.  Changed to clinda X 10 d by ID. His course was c/b episode of delirium and urinary retention requiring Loja.  He had non-sustained VT and underwent AICD placement with pip-tazo periop on 2/11, clinda was continued.  On 2/12, clinda was d/jovanni and he was given 7 d course of TMP/SX to cover isolates in wound culture.  He was d/jovanni to [facility] with Purvi on 2/14 but readmitted to [facility] on 2/21 for AMS initially thought due to UTI.  He was started on meropenem, which was d/jovanni on 2/22.  Urine culture grew Klebsiella pneumonia (S cefazolin, R TMP/SX), blood cultures were negative.  R knee wound had central eschar and eschar at border.  He underwent bedside excisional debridement to dermis.  He was started on ceftriaxone (for urine?).  Course was c/b development of sepsis/elevated LFTs/HIDA c/c acute cholecystitis.  Metronidazole was added.  He underwent cholecystostomy tube placement by IR on 2/28.  By 2/29, antibiotics were changed to meropenem.  Bile culture did not grow.  On 3/4, antibiotics were changed to levofloxacin, then moxifloxacin on 3/6 through 3/14.  He also was found to have L 2nd toe ulcer with XRay on 3/16 c/c OM.  He was seen by ID – recommended no Abx.  No MRI done.  He was d/jovanni to rehab on 3/19.  He had I&D of LLE wound on 6/5.  Culture sent in wrong media.  He was treated with local care, with clinical worsening.  On 6/19, he was started on amox/clav.  On 6/22, he underwent I&D of LLE ulcer by Vascular.  Bone culture did not grow.   He was seen for f/u on 7/10 – wound was not healing – was started on cipro, planned as 10 d course.  On 7/11, he began having R knee pain and inability to ambulate.  He came to the ED on 7/16, where he had T 100.3.  Swelling was noted about the knee.  Aspirate was done - 195,100 nucleated cells with 98% PMNs.  He was started on vanc and pip-tazo.  On 7/17, he underwent arthroscopic I&D.  Was found to have purulent joint fluid and inflammatory synovitis.  Blood culture and knee aspiration grew MRSA.  Pip-tazo was discontinued.  Rifampin started and vancomycin continued.  TTE/JOHN PAUL negative.  On 7/22 he underwent debridement of right knee with explantation and spacer placement. PICC was placed 7/27.  He had vascular wound on LLE s/p OR debridement of left anterior tibia and wound vac placement on 7/30.  He was discharged 8/10 to complete 6 week course of rifampin 300 mg PO q 12 h and vancomycin 1 g IV q 12 h (7/22/20 – 9/1/20).  After discharge he reported wound vac fell off multiple times.  He declined to present to ED of f/u with Dr. Bowen (plastic surgery).  He started doing wet to dry dressings at home.  He had follow up with Dr. Castro on 8/25. At that time he admitted to removing his own staples.  He missed dose of vancomycin last night and one dose last week.  Otherwise, reports having been 100% compliant. \par Reports having drainage from site of cholecystostomy tube. He describes drainage a mix of blood and pus.  He has an appointment with general surgeon today. \par

## 2020-09-01 NOTE — ED ADULT NURSE NOTE - CHPI ED NUR SYMPTOMS NEG
no nausea/no weakness/no decreased eating/drinking/no dizziness/no tingling/no fever/no chills/no vomiting

## 2020-09-01 NOTE — PHYSICAL EXAM
[General Appearance - Alert] : alert [General Appearance - In No Acute Distress] : in no acute distress [] : no respiratory distress [FreeTextEntry1] : R knee incision closed with some eschar, no drainage;   LLE wound with fibrinogranular base.  RUE PICC exit site clean

## 2020-09-01 NOTE — H&P ADULT - PROBLEM SELECTOR PLAN 1
- Pt has hx of Prosthetic Joint infection in R knee  - Now s/p antibiotic spacer by Dr. Castro on 7/22  - Presenting w/acute R knee pain and swelling x2-3 days  - Aspiration in ED by Dr. Castro was dry  - F/u Cell Count R Knee Aspirate  - Silvadene BID on R knee wound  - ID consult  - Continue home Vancomycin 1000 Q12 and Rifampin 300 Q12 for now  - F/u Vanco Trough 9/3 9AM  - Trend ESR/CRP  - Monitor wound for drainage or purulence  - Monitor for fevers, s/s of sepsis/bacteremia

## 2020-09-01 NOTE — ED ADULT NURSE REASSESSMENT NOTE - NS ED NURSE REASSESS COMMENT FT1
Pt received in no acute distress, a&ox3, breathing with ease on room air. 20g heplock top L forearm, and RUE PICC line, both intact, no edema or redness noted, flushing well. Pt noted with edema and warmth to R knee, denies any active pain at this time. Pt admitted, pending bed. Pt currently eating pizza, appears comfortable and denies any other c/o. Will monitor.

## 2020-09-01 NOTE — H&P ADULT - PROBLEM SELECTOR PLAN 2
- Pt blood sugars difficult to control during last admission  - + in ED  - Currently on Moderate ISS  - Medicine consult

## 2020-09-02 ENCOUNTER — APPOINTMENT (OUTPATIENT)
Dept: ORTHOPEDIC SURGERY | Facility: CLINIC | Age: 65
End: 2020-09-02

## 2020-09-02 LAB
ANION GAP SERPL CALC-SCNC: 10 MMOL/L — SIGNIFICANT CHANGE UP (ref 5–17)
BASOPHILS # BLD AUTO: 0.1 K/UL — SIGNIFICANT CHANGE UP (ref 0–0.2)
BASOPHILS NFR BLD AUTO: 1.2 % — SIGNIFICANT CHANGE UP (ref 0–2)
BUN SERPL-MCNC: 30 MG/DL — HIGH (ref 7–23)
CALCIUM SERPL-MCNC: 9 MG/DL — SIGNIFICANT CHANGE UP (ref 8.4–10.5)
CHLORIDE SERPL-SCNC: 99 MMOL/L — SIGNIFICANT CHANGE UP (ref 96–108)
CO2 SERPL-SCNC: 26 MMOL/L — SIGNIFICANT CHANGE UP (ref 22–31)
CREAT SERPL-MCNC: 1.16 MG/DL — SIGNIFICANT CHANGE UP (ref 0.5–1.3)
CULTURE RESULTS: SIGNIFICANT CHANGE UP
EOSINOPHIL # BLD AUTO: 0.49 K/UL — SIGNIFICANT CHANGE UP (ref 0–0.5)
EOSINOPHIL NFR BLD AUTO: 5.8 % — SIGNIFICANT CHANGE UP (ref 0–6)
GLUCOSE BLDC GLUCOMTR-MCNC: 162 MG/DL — HIGH (ref 70–99)
GLUCOSE BLDC GLUCOMTR-MCNC: 207 MG/DL — HIGH (ref 70–99)
GLUCOSE BLDC GLUCOMTR-MCNC: 222 MG/DL — HIGH (ref 70–99)
GLUCOSE BLDC GLUCOMTR-MCNC: 237 MG/DL — HIGH (ref 70–99)
GLUCOSE SERPL-MCNC: 197 MG/DL — HIGH (ref 70–99)
HCT VFR BLD CALC: 29.9 % — LOW (ref 39–50)
HGB BLD-MCNC: 8.3 G/DL — LOW (ref 13–17)
IMM GRANULOCYTES NFR BLD AUTO: 0.2 % — SIGNIFICANT CHANGE UP (ref 0–1.5)
LYMPHOCYTES # BLD AUTO: 1.16 K/UL — SIGNIFICANT CHANGE UP (ref 1–3.3)
LYMPHOCYTES # BLD AUTO: 13.8 % — SIGNIFICANT CHANGE UP (ref 13–44)
MCHC RBC-ENTMCNC: 20 PG — LOW (ref 27–34)
MCHC RBC-ENTMCNC: 27.8 GM/DL — LOW (ref 32–36)
MCV RBC AUTO: 72.2 FL — LOW (ref 80–100)
MONOCYTES # BLD AUTO: 1.03 K/UL — HIGH (ref 0–0.9)
MONOCYTES NFR BLD AUTO: 12.2 % — SIGNIFICANT CHANGE UP (ref 2–14)
NEUTROPHILS # BLD AUTO: 5.61 K/UL — SIGNIFICANT CHANGE UP (ref 1.8–7.4)
NEUTROPHILS NFR BLD AUTO: 66.8 % — SIGNIFICANT CHANGE UP (ref 43–77)
NRBC # BLD: 0 /100 WBCS — SIGNIFICANT CHANGE UP (ref 0–0)
PLATELET # BLD AUTO: 447 K/UL — HIGH (ref 150–400)
POTASSIUM SERPL-MCNC: 4.4 MMOL/L — SIGNIFICANT CHANGE UP (ref 3.5–5.3)
POTASSIUM SERPL-SCNC: 4.4 MMOL/L — SIGNIFICANT CHANGE UP (ref 3.5–5.3)
RBC # BLD: 4.14 M/UL — LOW (ref 4.2–5.8)
RBC # FLD: 19 % — HIGH (ref 10.3–14.5)
SODIUM SERPL-SCNC: 135 MMOL/L — SIGNIFICANT CHANGE UP (ref 135–145)
SPECIMEN SOURCE: SIGNIFICANT CHANGE UP
WBC # BLD: 8.41 K/UL — SIGNIFICANT CHANGE UP (ref 3.8–10.5)
WBC # FLD AUTO: 8.41 K/UL — SIGNIFICANT CHANGE UP (ref 3.8–10.5)

## 2020-09-02 PROCEDURE — 99223 1ST HOSP IP/OBS HIGH 75: CPT

## 2020-09-02 PROCEDURE — 71045 X-RAY EXAM CHEST 1 VIEW: CPT | Mod: 26

## 2020-09-02 PROCEDURE — 76705 ECHO EXAM OF ABDOMEN: CPT | Mod: 26

## 2020-09-02 RX ORDER — SODIUM CHLORIDE 9 MG/ML
10 INJECTION INTRAMUSCULAR; INTRAVENOUS; SUBCUTANEOUS
Refills: 0 | Status: DISCONTINUED | OUTPATIENT
Start: 2020-09-02 | End: 2020-09-04

## 2020-09-02 RX ORDER — DIPHENHYDRAMINE HCL 50 MG
50 CAPSULE ORAL EVERY 4 HOURS
Refills: 0 | Status: DISCONTINUED | OUTPATIENT
Start: 2020-09-02 | End: 2020-09-04

## 2020-09-02 RX ORDER — CHLORHEXIDINE GLUCONATE 213 G/1000ML
1 SOLUTION TOPICAL
Refills: 0 | Status: DISCONTINUED | OUTPATIENT
Start: 2020-09-02 | End: 2020-09-04

## 2020-09-02 RX ORDER — SILVER SULFADIAZINE 10 MG/G
1 CREAM TOPICAL TWICE DAILY
Qty: 1 | Refills: 1 | Status: DISCONTINUED | COMMUNITY
Start: 2019-11-19 | End: 2020-09-02

## 2020-09-02 RX ORDER — HYDROMORPHONE HYDROCHLORIDE 2 MG/ML
4 INJECTION INTRAMUSCULAR; INTRAVENOUS; SUBCUTANEOUS EVERY 4 HOURS
Refills: 0 | Status: DISCONTINUED | OUTPATIENT
Start: 2020-09-02 | End: 2020-09-03

## 2020-09-02 RX ORDER — HYDROMORPHONE HYDROCHLORIDE 2 MG/ML
2 INJECTION INTRAMUSCULAR; INTRAVENOUS; SUBCUTANEOUS EVERY 4 HOURS
Refills: 0 | Status: DISCONTINUED | OUTPATIENT
Start: 2020-09-02 | End: 2020-09-03

## 2020-09-02 RX ORDER — HYDROMORPHONE HYDROCHLORIDE 2 MG/ML
0.5 INJECTION INTRAMUSCULAR; INTRAVENOUS; SUBCUTANEOUS EVERY 4 HOURS
Refills: 0 | Status: DISCONTINUED | OUTPATIENT
Start: 2020-09-02 | End: 2020-09-04

## 2020-09-02 RX ORDER — HYDROMORPHONE HYDROCHLORIDE 2 MG/1
2 TABLET ORAL
Qty: 30 | Refills: 0 | Status: DISCONTINUED | COMMUNITY
Start: 2020-07-10 | End: 2020-09-02

## 2020-09-02 RX ORDER — GABAPENTIN 400 MG/1
100 CAPSULE ORAL EVERY 8 HOURS
Refills: 0 | Status: DISCONTINUED | OUTPATIENT
Start: 2020-09-02 | End: 2020-09-04

## 2020-09-02 RX ORDER — CYCLOBENZAPRINE HYDROCHLORIDE 10 MG/1
5 TABLET, FILM COATED ORAL THREE TIMES A DAY
Refills: 0 | Status: DISCONTINUED | OUTPATIENT
Start: 2020-09-02 | End: 2020-09-04

## 2020-09-02 RX ORDER — HYDROMORPHONE HYDROCHLORIDE 2 MG/ML
1 INJECTION INTRAMUSCULAR; INTRAVENOUS; SUBCUTANEOUS EVERY 4 HOURS
Refills: 0 | Status: DISCONTINUED | OUTPATIENT
Start: 2020-09-02 | End: 2020-09-02

## 2020-09-02 RX ADMIN — Medication 4: at 07:04

## 2020-09-02 RX ADMIN — Medication 25 MILLIGRAM(S): at 06:57

## 2020-09-02 RX ADMIN — SPIRONOLACTONE 25 MILLIGRAM(S): 25 TABLET, FILM COATED ORAL at 19:20

## 2020-09-02 RX ADMIN — GABAPENTIN 100 MILLIGRAM(S): 400 CAPSULE ORAL at 12:18

## 2020-09-02 RX ADMIN — Medication 2: at 11:43

## 2020-09-02 RX ADMIN — Medication 81 MILLIGRAM(S): at 19:20

## 2020-09-02 RX ADMIN — HYDROMORPHONE HYDROCHLORIDE 1 MILLIGRAM(S): 2 INJECTION INTRAMUSCULAR; INTRAVENOUS; SUBCUTANEOUS at 04:52

## 2020-09-02 RX ADMIN — ATORVASTATIN CALCIUM 40 MILLIGRAM(S): 80 TABLET, FILM COATED ORAL at 22:29

## 2020-09-02 RX ADMIN — Medication 250 MILLIGRAM(S): at 11:03

## 2020-09-02 RX ADMIN — Medication 81 MILLIGRAM(S): at 07:04

## 2020-09-02 RX ADMIN — HYDROMORPHONE HYDROCHLORIDE 4 MILLIGRAM(S): 2 INJECTION INTRAMUSCULAR; INTRAVENOUS; SUBCUTANEOUS at 22:29

## 2020-09-02 RX ADMIN — CELECOXIB 200 MILLIGRAM(S): 200 CAPSULE ORAL at 06:58

## 2020-09-02 RX ADMIN — Medication 1 APPLICATION(S): at 11:03

## 2020-09-02 RX ADMIN — Medication 1 APPLICATION(S): at 22:29

## 2020-09-02 RX ADMIN — OXYCODONE HYDROCHLORIDE 10 MILLIGRAM(S): 5 TABLET ORAL at 12:45

## 2020-09-02 RX ADMIN — Medication 4: at 19:20

## 2020-09-02 RX ADMIN — GABAPENTIN 100 MILLIGRAM(S): 400 CAPSULE ORAL at 22:29

## 2020-09-02 RX ADMIN — OXYCODONE HYDROCHLORIDE 10 MILLIGRAM(S): 5 TABLET ORAL at 11:45

## 2020-09-02 RX ADMIN — Medication 250 MILLIGRAM(S): at 22:28

## 2020-09-02 RX ADMIN — SPIRONOLACTONE 25 MILLIGRAM(S): 25 TABLET, FILM COATED ORAL at 06:57

## 2020-09-02 RX ADMIN — PANTOPRAZOLE SODIUM 40 MILLIGRAM(S): 20 TABLET, DELAYED RELEASE ORAL at 06:59

## 2020-09-02 RX ADMIN — TAMSULOSIN HYDROCHLORIDE 0.4 MILLIGRAM(S): 0.4 CAPSULE ORAL at 22:29

## 2020-09-02 RX ADMIN — HYDROMORPHONE HYDROCHLORIDE 1 MILLIGRAM(S): 2 INJECTION INTRAMUSCULAR; INTRAVENOUS; SUBCUTANEOUS at 04:48

## 2020-09-02 RX ADMIN — HYDROMORPHONE HYDROCHLORIDE 4 MILLIGRAM(S): 2 INJECTION INTRAMUSCULAR; INTRAVENOUS; SUBCUTANEOUS at 23:20

## 2020-09-02 RX ADMIN — Medication 4: at 22:28

## 2020-09-02 NOTE — CONSULT NOTE ADULT - SUBJECTIVE AND OBJECTIVE BOX
CHIEF COMPLAINT:  Leg edema and Knee pain  HISTORY OF PRESENT ILLNESS:  Juanito Blas is a 66y/o M with PMHx of CAD (s/p CABG 2018), CHF (EF 25%) Diabetes, HLD, HTN, R Revision Total Knee Arthroplasty with Antibiotic Spacer on 7/22 by Dr. Castro for Prosthetic Joint Infection, presents with 2-3 days of worsening R knee pain and swelling. Pt denies any trauma to R knee. Pt denies any numbness/tingling. Notes minimal drainage from incision, no purulence. Pt denies any recent illness. Pt was discharged last hospital visit 3 weeks ago after multiple revision knee surgeries complicated by prosthetic joint infection and bacteremia. Sent home with PICC line, getting vancomycin 1000 Q12 and Rifampin 300 Q12 daily. Reports compliance with medication. Denies any chest pain/SOB/fevers/chills. The patient had bypass of his LAD in June of 2018. In November of 2018 the patient had a stent placed in his right coronary artery and  first obtuse marginal at two sittings. He has recently walks a few blocks without chest pain or dyspnea. He denies palpitations or dizziness. The patient feels that his lower legs are tense and swollen although this is not clearly evident. He took one dose of diuretics recently with good response.    Chart, PMH, medication and allergies reviewed  PAST MEDICAL & SURGICAL HISTORY:  Diabetic neuropathy  STEMI (ST elevation myocardial infarction)  Diverticulitis  MRSA bacteremia  History of celiac disease  CHF (congestive heart failure): EF ~ 25%  HTN (hypertension)  Diabetes: on insulin pump  Blood clot due to device, implant, or graft: was on antiplatelet agents a   HLD (hyperlipidemia)  Osteoarthritis  Atherosclerosis of coronary artery: CAD (coronary artery disease)  Status post percutaneous transluminal coronary angioplasty: in 2012  History of open reduction and internal fixation (ORIF) procedure  Surgery, elective: Right shoulder  Surgery, elective: right knee wound debridement  S/P TKR (total knee replacement), right: with infection Mrsa   per pt he was cleared from MRSA infection  S/P CABG x 1: 2018  Stented coronary artery: 10/18 heart attack  INFERIOR WALL MI  Other postprocedural status: Fixation hardware in foot LEFT      [x  ] Diabetes   [ x ] Hypertension  [x  ] Hyperlipidemia  [ x ] CAD  [ x ] PCI  [x  ] CABG    PREVIOUS DIAGNOSTIC TESTING:    [ x ] Echocardiogram:  [x  ]  Catheterization:  [ x ] Stress Test:  	    MEDICATIONS:  MEDICATIONS  (STANDING):  aspirin enteric coated 81 milliGRAM(s) Oral two times a day  atorvastatin 40 milliGRAM(s) Oral at bedtime  dextrose 5%. 1000 milliLiter(s) (50 mL/Hr) IV Continuous <Continuous>  dextrose 50% Injectable 12.5 Gram(s) IV Push once  dextrose 50% Injectable 25 Gram(s) IV Push once  dextrose 50% Injectable 25 Gram(s) IV Push once  gabapentin 100 milliGRAM(s) Oral every 8 hours  insulin lispro (HumaLOG) corrective regimen sliding scale   SubCutaneous Before meals and at bedtime  metoprolol succinate ER 25 milliGRAM(s) Oral daily  pantoprazole    Tablet 40 milliGRAM(s) Oral before breakfast  prasugrel 10 milliGRAM(s) Oral daily  rifAMPin 300 milliGRAM(s) Oral every 12 hours  silver sulfADIAZINE 1% Cream 1 Application(s) Topical two times a day  spironolactone 25 milliGRAM(s) Oral two times a day  tamsulosin 0.4 milliGRAM(s) Oral at bedtime  vancomycin  IVPB 1000 milliGRAM(s) IV Intermittent every 12 hours      FAMILY HISTORY:  Family history of allergies: daughter  Family history of heart disease (Mother)      SOCIAL HISTORY:    [  ] Never smoker  [  ] Non-smoker  [  ] Smoker  [ x ] Social alcohol use  [x  ] Former smoker    Allergies    ACE inhibitors (Hives)  carvedilol (Other)  enalapril (Hives)  Entresto (Other)    Intolerances    	    REVIEW OF SYSTEMS:  CONSTITUTIONAL: No fever, weight loss, or fatigue  EYES: No eye pain, visual disturbances, or discharge  ENT:  No difficulty hearing, tinnitus, vertigo; No sinus or throat pain  NECK: No pain or stiffness  PULMONARY: No cough, no wheezing, chills or hemoptysis; No Shortness of Breath  CARDIOVASCULAR: No chest pain, no  palpitations, no passing out, dizziness, +leg swelling  GASTROINTESTINAL: No abdominal  pain. No nausea, vomiting, or hematemesis; No diarrhea or constipation. No melena or hematochezia.  GENITOURINARY: No dysuria, frequency, hematuria, or incontinence  NEUROLOGICAL: No headaches, memory loss, loss of strength, numbness, or tremors  SKIN: No itching, burning, rashes, or lesions   LYMPH Nodes: No enlarged glands  ENDOCRINE: No heat or cold intolerance; No hair loss  MUSCULOSKELETAL: No joint pain or swelling; No muscle, back, or extremity pain  PSYCHIATRIC: No depression, anxiety, mood swings, or difficulty sleeping  HEME/LYMPH: No easy bruising, or bleeding gums  ALLERGY AND IMMUNOLOGIC: No hives or eczema	    PHYSICAL EXAM:  T(C): 36.6 (09-02-20 @ 16:50), Max: 37 (09-02-20 @ 09:00)  HR: 81 (09-02-20 @ 16:50) (72 - 86)  BP: 100/59 (09-02-20 @ 16:50) (91/56 - 111/57)  RR: 20 (09-02-20 @ 16:50) (16 - 20)  SpO2: 99% (09-02-20 @ 16:50) (96% - 100%)  Wt(kg): --  I&O's Summary    02 Sep 2020 07:01  -  02 Sep 2020 20:18  --------------------------------------------------------  IN: 0 mL / OUT: 1100 mL / NET: -1100 mL        Appearance: Normal	  HEENT:   Normal oral mucosa, PERRL, EOMI	  Lymphatic: No lymphadenopathy  Cardiovascular: Normal S1 S2, No JVD, No murmur, No edema  Pulmonary: Lungs rare wheeze  Psychiatry: A & O x 3, Mood & affect appropriate  Gastrointestinal:  Soft, Non-tender, + BS	  Skin: No rashes, No ecchymoses, No cyanosis	  Neurologic: Non-focal  Extremities: Normal range of motion, No clubbing or cyanosis  	 	  CARDIAC MARKERS:                                8.3    8.41  )-----------( 447      ( 02 Sep 2020 05:51 )             29.9     09-02    135  |  99  |  30<H>  ----------------------------<  197<H>  4.4   |  26  |  1.16    Ca    9.0      02 Sep 2020 05:51    TPro  7.4  /  Alb  3.6  /  TBili  0.4  /  DBili  x   /  AST  15  /  ALT  13  /  AlkPhos  176<H>  09-01    proBNP:  Lipid Profile:  HgA1c:  TSH:  PT/INR - ( 01 Sep 2020 17:27 )   PT: 16.9 sec;   INR: 1.43     Serum Pro-Brain Natriuretic Peptide (07.20.20 @ 16:40)    Serum Pro-Brain Natriuretic Peptide: 9916:      PTT - ( 01 Sep 2020 17:27 )  PTT:25.4 sec  TELEMETRY: 	NSR    ECG:  	QTC  RADIOLOGY:	    ASSESSMENT/PLAN: 	  Severely reduced ejection fraction-the patient takes diuretics occasionally for weight gain. He is sedentary however denies dyspnea. His pulmonary pressures are high on echo. Chest x-ray shows no CHF.  Rx metoprolol 25mg daily and digoxin. Check digoxin level.     Coronary artery disease-the patient denies chest discomfort. His EKG is uninterpretable secondary to the pacemaker. He was revascularized less than two years ago. There is no clinical evidence for ischemia. Rx aspirin and atorvastatin.  Rx prasugrel.     Defibrillator-no recent events.    The surgery is low risk.  The patient RCRI score is 3. He is high risk. If his EKG is ok he is optimized for surgery.

## 2020-09-02 NOTE — CONSULT NOTE ADULT - SUBJECTIVE AND OBJECTIVE BOX
Attending:  Pietro    HPI:   Juanito Blas is a 66y/o M with PMHx of CAD (s/p CABG 2018), CHF (EF 25%) Diabetes, HLD, HTN, R Revision Total Knee Arthroplasty with Antibiotic Spacer on 7/22 by Dr. Castro for Prosthetic Joint Infection, presents with 2-3 days of worsening R knee pain and swelling. Pt denies any trauma to R knee. Pt denies any numbness/tingling. Notes minimal drainage from incision, no purulence. Pt denies any recent illness. Pt was discharged last hospital visit 3 weeks ago after multiple revision knee surgeries complicated by prosthetic joint infection and bacteremia. Sent home with PICC line, getting vancomycin 1000 Q12 and Rifampin 300 Q12 daily. Reports compliance with medication. Denies any chest pain/SOB/fevers/chills. Pt also has chronic large skin defect on L anterior tibia, which was managed by vascular and plastic surgery last visit. Only reports mild pain over this wound, denies any drainage, trauma.     History as above, general surgery consulted for evaluation of drainage from prior percutaneous cholecystostomy site. Patient underwent perc sudhir at [facility] on 2/28/20 for likely cholecystitis in the setting of multiple comorbidities described above. His drain was removed as an outpatient in late May. He reports 2-3 weeks of pus draining from the wound on a daily basis, reports being able to express small amounts at any time. Patient denies fevers, chills, nausea, vomiting, pain near site.     PAST MEDICAL & SURGICAL HISTORY:  Diabetic neuropathy  STEMI (ST elevation myocardial infarction)  Diverticulitis  MRSA bacteremia  History of celiac disease  CHF (congestive heart failure): EF ~ 25%  HTN (hypertension)  Diabetes: on insulin pump  Blood clot due to device, implant, or graft: was on blood thinners  HLD (hyperlipidemia)  Osteoarthritis  Atherosclerosis of coronary artery: CAD (coronary artery disease)  Status post percutaneous transluminal coronary angioplasty: in 2012  History of open reduction and internal fixation (ORIF) procedure  Surgery, elective: Right shoulder  Surgery, elective: right knee wound debridement  S/P TKR (total knee replacement), right: with infection Mrsa   per pt he was cleared from MRSA infection  S/P CABG x 1: 2018  Stented coronary artery: 10/18 heart attack  INFERIOR WALL MI  Other postprocedural status: Fixation hardware in foot LEFT    MEDICATIONS  (STANDING):  aspirin enteric coated 81 milliGRAM(s) Oral two times a day  atorvastatin 40 milliGRAM(s) Oral at bedtime  celecoxib 200 milliGRAM(s) Oral two times a day  dextrose 5%. 1000 milliLiter(s) (50 mL/Hr) IV Continuous <Continuous>  dextrose 50% Injectable 12.5 Gram(s) IV Push once  dextrose 50% Injectable 25 Gram(s) IV Push once  dextrose 50% Injectable 25 Gram(s) IV Push once  DULoxetine 30 milliGRAM(s) Oral daily  insulin lispro (HumaLOG) corrective regimen sliding scale   SubCutaneous Before meals and at bedtime  metoprolol succinate ER 25 milliGRAM(s) Oral daily  oxyCODONE  ER Tablet 10 milliGRAM(s) Oral every 12 hours  pantoprazole    Tablet 40 milliGRAM(s) Oral before breakfast  prasugrel 10 milliGRAM(s) Oral daily  rifAMPin 300 milliGRAM(s) Oral every 12 hours  silver sulfADIAZINE 1% Cream 1 Application(s) Topical two times a day  spironolactone 25 milliGRAM(s) Oral two times a day  tamsulosin 0.4 milliGRAM(s) Oral at bedtime  vancomycin  IVPB 1000 milliGRAM(s) IV Intermittent every 12 hours    MEDICATIONS  (PRN):  acetaminophen   Tablet .. 650 milliGRAM(s) Oral every 6 hours PRN Temp greater or equal to 38C (100.4F), Mild Pain (1 - 3)  dextrose 40% Gel 15 Gram(s) Oral once PRN Blood Glucose LESS THAN 70 milliGRAM(s)/deciliter  glucagon  Injectable 1 milliGRAM(s) IntraMuscular once PRN Glucose LESS THAN 70 milligrams/deciliter  HYDROmorphone  Injectable 1 milliGRAM(s) IV Push every 4 hours PRN Breakthrough pain  oxyCODONE    IR 10 milliGRAM(s) Oral every 4 hours PRN Severe Pain (7 - 10)    Allergies    ACE inhibitors (Hives)  carvedilol (Other)  enalapril (Hives)  Entresto (Other)    Intolerances      FAMILY HISTORY:  Family history of allergies: daughter  Family history of heart disease (Mother)      T(C): 36.6 (09-02-20 @ 04:38), Max: 36.7 (09-01-20 @ 16:48)  HR: 84 (09-02-20 @ 04:53) (77 - 88)  BP: 102/52 (09-02-20 @ 04:53) (96/63 - 108/52)  RR: 16 (09-02-20 @ 04:53) (16 - 18)  SpO2: 100% (09-02-20 @ 04:53) (96% - 100%)    GENERAL: NAD, Resting comfortably in bed,  HEENT: NCAT, MMM, Trachea midline, Normal conjuntiva, PERRL  RESP: Nonlabored breathing, No respiratory distress  CARD: Normal rate, Normal peripheral perfusion  GI: Soft, NT, ND, No guarding, No rebound tenderness. RUQ drain site with: minimal erythema, 3 cm induration, no fluctuance, ~0.5 cc purulent drainage expressed  EXTREM: WWP, LLE shin wound dressing c/d/i, R knee dressing c/d/i  SKIN: No rashes, no lesions  NEURO: AAOx3, No focal motor or sensory deficits  PSYCH: Affect and characteristics of appearance, verbalizations, behaviors are appropriate    LABS:                        8.5    10.02 )-----------( 428      ( 01 Sep 2020 17:27 )             29.7     09-01    135  |  98  |  28<H>  ----------------------------<  184<H>  4.7   |  27  |  1.07    Ca    9.0      01 Sep 2020 20:11    TPro  7.4  /  Alb  3.6  /  TBili  0.4  /  DBili  x   /  AST  15  /  ALT  13  /  AlkPhos  176<H>  09-01    PT/INR - ( 01 Sep 2020 17:27 )   PT: 16.9 sec;   INR: 1.43          PTT - ( 01 Sep 2020 17:27 )  PTT:25.4 sec  LIVER FUNCTIONS - ( 01 Sep 2020 20:11 )  Alb: 3.6 g/dL / Pro: 7.4 g/dL / ALK PHOS: 176 U/L / ALT: 13 U/L / AST: 15 U/L / GGT: x             RADIOLOGY & ADDITIONAL STUDIES:    Assessment: 65M PMHx CAD s/p CABG, CHF, DM, HLD, HTN, acute cholecystitis s/p perc sudhir 2/28, recent R TKA 7/22 presents with R knee infection. General surgery consulted for purulent drainage near prior perc sudhir tube site. Patient currently afebrile, WBC 10.02, Tbili 0.5, , no transaminitis. Patient was planning on having GB however this was deferred given other ongoing medical conditions.     Recommendations:  - Obtain RUQ US to evaluate for soft tissue abscess and gallbladder/biliary pathology.  - Further recommendations pending discussion with attending Attending:  Pietro    HPI:   Juanito Blas is a 66y/o M with PMHx of CAD (s/p CABG 2018), CHF (EF 25%) Diabetes, HLD, HTN, R Revision Total Knee Arthroplasty with Antibiotic Spacer on 7/22 by Dr. Castro for Prosthetic Joint Infection, presents with 2-3 days of worsening R knee pain and swelling. Pt denies any trauma to R knee. Pt denies any numbness/tingling. Notes minimal drainage from incision, no purulence. Pt denies any recent illness. Pt was discharged last hospital visit 3 weeks ago after multiple revision knee surgeries complicated by prosthetic joint infection and bacteremia. Sent home with PICC line, getting vancomycin 1000 Q12 and Rifampin 300 Q12 daily. Reports compliance with medication. Denies any chest pain/SOB/fevers/chills. Pt also has chronic large skin defect on L anterior tibia, which was managed by vascular and plastic surgery last visit. Only reports mild pain over this wound, denies any drainage, trauma.     History as above, general surgery consulted for evaluation of drainage from prior percutaneous cholecystostomy site. Patient underwent perc sudhir at [facility] on 2/28/20 for likely cholecystitis in the setting of multiple comorbidities described above. His drain was removed as an outpatient in late May. He reports 2-3 weeks of pus draining from the wound on a daily basis, reports being able to express small amounts at any time. Patient denies fevers, chills, nausea, vomiting, pain near site.     PAST MEDICAL & SURGICAL HISTORY:  Diabetic neuropathy  STEMI (ST elevation myocardial infarction)  Diverticulitis  MRSA bacteremia  History of celiac disease  CHF (congestive heart failure): EF ~ 25%  HTN (hypertension)  Diabetes: on insulin pump  Blood clot due to device, implant, or graft: was on blood thinners  HLD (hyperlipidemia)  Osteoarthritis  Atherosclerosis of coronary artery: CAD (coronary artery disease)  Status post percutaneous transluminal coronary angioplasty: in 2012  History of open reduction and internal fixation (ORIF) procedure  Surgery, elective: Right shoulder  Surgery, elective: right knee wound debridement  S/P TKR (total knee replacement), right: with infection Mrsa   per pt he was cleared from MRSA infection  S/P CABG x 1: 2018  Stented coronary artery: 10/18 heart attack  INFERIOR WALL MI  Other postprocedural status: Fixation hardware in foot LEFT    MEDICATIONS  (STANDING):  aspirin enteric coated 81 milliGRAM(s) Oral two times a day  atorvastatin 40 milliGRAM(s) Oral at bedtime  celecoxib 200 milliGRAM(s) Oral two times a day  dextrose 5%. 1000 milliLiter(s) (50 mL/Hr) IV Continuous <Continuous>  dextrose 50% Injectable 12.5 Gram(s) IV Push once  dextrose 50% Injectable 25 Gram(s) IV Push once  dextrose 50% Injectable 25 Gram(s) IV Push once  DULoxetine 30 milliGRAM(s) Oral daily  insulin lispro (HumaLOG) corrective regimen sliding scale   SubCutaneous Before meals and at bedtime  metoprolol succinate ER 25 milliGRAM(s) Oral daily  oxyCODONE  ER Tablet 10 milliGRAM(s) Oral every 12 hours  pantoprazole    Tablet 40 milliGRAM(s) Oral before breakfast  prasugrel 10 milliGRAM(s) Oral daily  rifAMPin 300 milliGRAM(s) Oral every 12 hours  silver sulfADIAZINE 1% Cream 1 Application(s) Topical two times a day  spironolactone 25 milliGRAM(s) Oral two times a day  tamsulosin 0.4 milliGRAM(s) Oral at bedtime  vancomycin  IVPB 1000 milliGRAM(s) IV Intermittent every 12 hours    MEDICATIONS  (PRN):  acetaminophen   Tablet .. 650 milliGRAM(s) Oral every 6 hours PRN Temp greater or equal to 38C (100.4F), Mild Pain (1 - 3)  dextrose 40% Gel 15 Gram(s) Oral once PRN Blood Glucose LESS THAN 70 milliGRAM(s)/deciliter  glucagon  Injectable 1 milliGRAM(s) IntraMuscular once PRN Glucose LESS THAN 70 milligrams/deciliter  HYDROmorphone  Injectable 1 milliGRAM(s) IV Push every 4 hours PRN Breakthrough pain  oxyCODONE    IR 10 milliGRAM(s) Oral every 4 hours PRN Severe Pain (7 - 10)    Allergies    ACE inhibitors (Hives)  carvedilol (Other)  enalapril (Hives)  Entresto (Other)    Intolerances      FAMILY HISTORY:  Family history of allergies: daughter  Family history of heart disease (Mother)      T(C): 36.6 (09-02-20 @ 04:38), Max: 36.7 (09-01-20 @ 16:48)  HR: 84 (09-02-20 @ 04:53) (77 - 88)  BP: 102/52 (09-02-20 @ 04:53) (96/63 - 108/52)  RR: 16 (09-02-20 @ 04:53) (16 - 18)  SpO2: 100% (09-02-20 @ 04:53) (96% - 100%)    GENERAL: NAD, Resting comfortably in bed,  HEENT: NCAT, MMM, Trachea midline, Normal conjuntiva, PERRL  RESP: Nonlabored breathing, No respiratory distress  CARD: Normal rate, Normal peripheral perfusion  GI: Soft, NT, ND, No guarding, No rebound tenderness. RUQ drain site with: minimal erythema, 3 cm induration, no fluctuance, ~0.5 cc purulent drainage expressed  EXTREM: WWP, LLE shin wound dressing c/d/i, R knee dressing c/d/i  SKIN: No rashes, no lesions  NEURO: AAOx3, No focal motor or sensory deficits  PSYCH: Affect and characteristics of appearance, verbalizations, behaviors are appropriate    LABS:                        8.5    10.02 )-----------( 428      ( 01 Sep 2020 17:27 )             29.7     09-01    135  |  98  |  28<H>  ----------------------------<  184<H>  4.7   |  27  |  1.07    Ca    9.0      01 Sep 2020 20:11    TPro  7.4  /  Alb  3.6  /  TBili  0.4  /  DBili  x   /  AST  15  /  ALT  13  /  AlkPhos  176<H>  09-01    PT/INR - ( 01 Sep 2020 17:27 )   PT: 16.9 sec;   INR: 1.43          PTT - ( 01 Sep 2020 17:27 )  PTT:25.4 sec  LIVER FUNCTIONS - ( 01 Sep 2020 20:11 )  Alb: 3.6 g/dL / Pro: 7.4 g/dL / ALK PHOS: 176 U/L / ALT: 13 U/L / AST: 15 U/L / GGT: x             RADIOLOGY & ADDITIONAL STUDIES:    Assessment: 65M PMHx CAD s/p CABG, CHF, DM, HLD, HTN, acute cholecystitis s/p perc sudhir 2/28, recent R TKA 7/22 presents with R knee infection. General surgery consulted for purulent drainage near prior perc sudhir tube site. Patient currently afebrile, WBC 10.02, Tbili 0.5, , no transaminitis. Patient was planning on having GB however this was deferred given other ongoing medical conditions.     Recommendations:  - Obtain RUQ US to evaluate for soft tissue abscess and gallbladder/biliary pathology. Pending results may need I&D of soft tissue abscess.  - Patient will need cholecystectomy to prevent future episodes of cholecystitis. Surgery pending clearance by his cardiologist, endocrinologist, vascular surgeon, PCP.  - Further recommendations pending discussion with attending Attending:  Pietro    HPI:   Juanito Blas is a 66y/o M with PMHx of CAD (s/p CABG 2018), CHF (EF 25%) Diabetes, HLD, HTN, R Revision Total Knee Arthroplasty with Antibiotic Spacer on 7/22 by Dr. Castro for Prosthetic Joint Infection, presents with 2-3 days of worsening R knee pain and swelling. Pt denies any trauma to R knee. Pt denies any numbness/tingling. Notes minimal drainage from incision, no purulence. Pt denies any recent illness. Pt was discharged last hospital visit 3 weeks ago after multiple revision knee surgeries complicated by prosthetic joint infection and bacteremia. Sent home with PICC line, getting vancomycin 1000 Q12 and Rifampin 300 Q12 daily. Reports compliance with medication. Denies any chest pain/SOB/fevers/chills. Pt also has chronic large skin defect on L anterior tibia, which was managed by vascular and plastic surgery last visit. Only reports mild pain over this wound, denies any drainage, trauma.     History as above, general surgery consulted for evaluation of drainage from prior percutaneous cholecystostomy site. Patient underwent perc sudhir at [facility] on 2/28/20 for likely cholecystitis in the setting of multiple comorbidities described above. His drain was removed as an outpatient in late May. He reports 2-3 weeks of pus draining from the wound on a daily basis, reports being able to express small amounts at any time. Patient denies fevers, chills, nausea, vomiting, pain near site.     PAST MEDICAL & SURGICAL HISTORY:  Diabetic neuropathy  STEMI (ST elevation myocardial infarction)  Diverticulitis  MRSA bacteremia  History of celiac disease  CHF (congestive heart failure): EF ~ 25%  HTN (hypertension)  Diabetes: on insulin pump  Blood clot due to device, implant, or graft: was on blood thinners  HLD (hyperlipidemia)  Osteoarthritis  Atherosclerosis of coronary artery: CAD (coronary artery disease)  Status post percutaneous transluminal coronary angioplasty: in 2012  History of open reduction and internal fixation (ORIF) procedure  Surgery, elective: Right shoulder  Surgery, elective: right knee wound debridement  S/P TKR (total knee replacement), right: with infection Mrsa   per pt he was cleared from MRSA infection  S/P CABG x 1: 2018  Stented coronary artery: 10/18 heart attack  INFERIOR WALL MI  Other postprocedural status: Fixation hardware in foot LEFT    MEDICATIONS  (STANDING):  aspirin enteric coated 81 milliGRAM(s) Oral two times a day  atorvastatin 40 milliGRAM(s) Oral at bedtime  celecoxib 200 milliGRAM(s) Oral two times a day  dextrose 5%. 1000 milliLiter(s) (50 mL/Hr) IV Continuous <Continuous>  dextrose 50% Injectable 12.5 Gram(s) IV Push once  dextrose 50% Injectable 25 Gram(s) IV Push once  dextrose 50% Injectable 25 Gram(s) IV Push once  DULoxetine 30 milliGRAM(s) Oral daily  insulin lispro (HumaLOG) corrective regimen sliding scale   SubCutaneous Before meals and at bedtime  metoprolol succinate ER 25 milliGRAM(s) Oral daily  oxyCODONE  ER Tablet 10 milliGRAM(s) Oral every 12 hours  pantoprazole    Tablet 40 milliGRAM(s) Oral before breakfast  prasugrel 10 milliGRAM(s) Oral daily  rifAMPin 300 milliGRAM(s) Oral every 12 hours  silver sulfADIAZINE 1% Cream 1 Application(s) Topical two times a day  spironolactone 25 milliGRAM(s) Oral two times a day  tamsulosin 0.4 milliGRAM(s) Oral at bedtime  vancomycin  IVPB 1000 milliGRAM(s) IV Intermittent every 12 hours    MEDICATIONS  (PRN):  acetaminophen   Tablet .. 650 milliGRAM(s) Oral every 6 hours PRN Temp greater or equal to 38C (100.4F), Mild Pain (1 - 3)  dextrose 40% Gel 15 Gram(s) Oral once PRN Blood Glucose LESS THAN 70 milliGRAM(s)/deciliter  glucagon  Injectable 1 milliGRAM(s) IntraMuscular once PRN Glucose LESS THAN 70 milligrams/deciliter  HYDROmorphone  Injectable 1 milliGRAM(s) IV Push every 4 hours PRN Breakthrough pain  oxyCODONE    IR 10 milliGRAM(s) Oral every 4 hours PRN Severe Pain (7 - 10)    Allergies    ACE inhibitors (Hives)  carvedilol (Other)  enalapril (Hives)  Entresto (Other)    Intolerances      FAMILY HISTORY:  Family history of allergies: daughter  Family history of heart disease (Mother)      T(C): 36.6 (09-02-20 @ 04:38), Max: 36.7 (09-01-20 @ 16:48)  HR: 84 (09-02-20 @ 04:53) (77 - 88)  BP: 102/52 (09-02-20 @ 04:53) (96/63 - 108/52)  RR: 16 (09-02-20 @ 04:53) (16 - 18)  SpO2: 100% (09-02-20 @ 04:53) (96% - 100%)    GENERAL: NAD, Resting comfortably in bed,  HEENT: NCAT, MMM, Trachea midline, Normal conjuntiva, PERRL  RESP: Nonlabored breathing, No respiratory distress  CARD: Normal rate, Normal peripheral perfusion  GI: Soft, NT, ND, No guarding, No rebound tenderness. RUQ drain site with: minimal erythema, 3 cm induration, no fluctuance, ~0.5 cc purulent drainage expressed  EXTREM: WWP, LLE shin wound dressing c/d/i, R knee dressing c/d/i  SKIN: No rashes, no lesions  NEURO: AAOx3, No focal motor or sensory deficits  PSYCH: Affect and characteristics of appearance, verbalizations, behaviors are appropriate    LABS:                        8.5    10.02 )-----------( 428      ( 01 Sep 2020 17:27 )             29.7     09-01    135  |  98  |  28<H>  ----------------------------<  184<H>  4.7   |  27  |  1.07    Ca    9.0      01 Sep 2020 20:11    TPro  7.4  /  Alb  3.6  /  TBili  0.4  /  DBili  x   /  AST  15  /  ALT  13  /  AlkPhos  176<H>  09-01    PT/INR - ( 01 Sep 2020 17:27 )   PT: 16.9 sec;   INR: 1.43          PTT - ( 01 Sep 2020 17:27 )  PTT:25.4 sec  LIVER FUNCTIONS - ( 01 Sep 2020 20:11 )  Alb: 3.6 g/dL / Pro: 7.4 g/dL / ALK PHOS: 176 U/L / ALT: 13 U/L / AST: 15 U/L / GGT: x             RADIOLOGY & ADDITIONAL STUDIES:    Assessment: 65M PMHx CAD s/p CABG, CHF, DM, HLD, HTN, acute cholecystitis s/p perc sudhir 2/28, recent R TKA 7/22 presents with R knee infection. General surgery consulted for purulent drainage near prior perc sudhir tube site. Patient currently afebrile, WBC 10.02, Tbili 0.5, , no transaminitis. Patient was planning on having GB however this was deferred given other ongoing medical conditions.     Recommendations:  - Obtain RUQ US to evaluate for soft tissue abscess and gallbladder/biliary pathology. Pending results may need I&D of soft tissue abscess.  - Patient will need cholecystectomy to prevent future episodes of cholecystitis. Surgery pending clearance by his cardiologist, endocrinologist, vascular surgeon, PCP.  - Further recommendations pending discussion with attending    -----Addendum-------  Ultrasound performed. Reveals contracted gallbladder w/ cholelithiasis, no pericholecystic fluid. CBD mildly dilated at 8cm changed from 0.6cm. No evidence of abscess or fluid collection on ultrasound.     -No acute surgical intervention at this time  -Will need cholecystectomy once medically optimized  -Team 4c will continue to follow

## 2020-09-02 NOTE — CONSULT NOTE ADULT - SUBJECTIVE AND OBJECTIVE BOX
HPI:  66 yo M with HTN, hyperlipidemia, type II DM with peripheral neuropathy, CAD s/p MIDCAB (2018)/VERONICA, HFrEF, AICD (2/20), pulmonary HTN, chronic cholecystitis with recurrent R knee PPJI.  He underwent robotic-assisted R TKR on 10/21/19 without complication.  He was readmitted on 11/22/19 with dehiscence of his incision.  He had been started on TMP/SX 1 week earlier without improvement.  On 11/22, he underwent I&D and polyethylene liner exchange.  OR cultures grew MRSA (S vanc 1.5, clinda, dapto, linezolid, rif and tet/doxy).  He was treated with vancomycin and rifampin.  His LFTs became elevated, rifampin was held.  He was d/jovanni 11/26 to complete 6 weeks of vancomycin.  While on therapy, he was admitted for CHF exacerbation (12/12-12/16).  Rifampin was restarted around the d/c but he again was admitted on 12/21 for necrotic R TKA wound.  He was seen by Plastics and immobilized.  He was d/jovanni on vanc/rifampin on 12/27.  He was transitioned from vancomycin to doxy on 1/15.  On 1/16, he underwent wound debridement of skin and subcutaneous tissue/fascia by Vascular.  No pus was identified, a wound vac was placed and he was admitted.  ID recommended two week course of doxy.  He was d/jovanni on 1/18.  On 1/31, he was admitted to [facility] following R hip fracture during which he underwent selective debridement of R knee wound with STSG from R thigh by Burn service.  Debrided tissue grew Serratia marcescens and E coli (both S ceftriaxone). He initially was started on amp/sulbactam.  Changed to clinda, then TMP/SX. His course was c/b episode of delirium and urinary retention requiring Loja.  He had non-sustained VT and underwent AICD placement with pip-tazo periop on 2/11, clinda was continued.  On 2/12, clinda was d/jovanni and he was given 7 d course of TMP/SX to cover isolates in wound culture.  He was d/jovanni to [facility] with Loja on 2/14 but readmitted to [facility] on 2/21 for AMS initially thought due to UTI.  He developed sepsis due to acute cholecystitis.  He underwent cholecystostomy tube placement by IR on 2/28.  By 2/29, antibiotics were changed to meropenem.  Bile culture did not grow.  On 3/4, antibiotics were changed to levofloxacin, then moxifloxacin on 3/6 through 3/14.  He was d/jovanni to rehab on 3/19.  He had I&D of a LLE wound on 6/5.  Culture sent in wrong media.  He was treated with local care, with clinical worsening.  On 6/19, he was started on amox/clav.  On 6/22, he underwent I&D of LLE ulcer by Vascular.  Bone culture did not grow.   He was seen for f/u on 7/10 – wound was not healing – was started on cipro, planned as 10 d course.  On 7/16, he was admitted to [facility] with fever, pain, swelling of R knee, WBC 13.24.  Aspirate was done - 195,100 nucleated cells with 98% PMNs.  He was started on vanc and pip-tazo.  He underwent arthroscopic I&D on 7/17.  Was found to have purulent joint fluid and inflammatory synovitis.  Blood and aspirate cultures from 7/16 grew MRSA (vancomycin YOLIE 1.5).  His leukocytosis initially improved but then again worsened, as did his ESR/CRP.  JOHN PAUL on 7/21 did not show vegetation. On 7/22, he underwent explantation of prosthesis with insertion of antibiotic space.  OR cultures did not grow.  He had fever for several days postoperatively that subsequently resolved. Blood cultures were negative.  PICC was placed on 7/27 with plan for 6 week course of vancomycin/rifampin from explantation (7/22-9/1).  He underwent debridement of LLE wound with application of Integra and VAC placement on 7/30.  He had ongoing pain R knee – was noted to have large effusion – evacuation of >100 cc of hemarthrosis was done on 8/1.  Cultures were negative.  He was d/jovanni home on 8/11. Following d/c, he had difficulties with LLE VAC – was d/jovanni and he was applying wet to dry dressings with Silvadene.  He had persistently high inflammatory markers as an outpatient – most recently , CRP 3 on 8/31 with therapeutic vancomycin trough.  When seen by me on 9/1 by TEB, he reported compliance with vancomycin and rifampin – reportedly only missing 2 doses.  In addition to the above, he reported drainage of pus from scar from prior cholecystostomy tube (d/jovanni in 5/2020) for ~4 weeks with increased drainage X ~3 weeks.  He had been advised to see a general surgeon but had not yet done so.  I discussed my discomfort in d/cing antibiotics despite completion of 6 week course with Dr. Castro.  Because of difficulties in outpatient evaluations, he was admitted last night.  No fever, chills. R knee feels stiff but not painful.  No N, V, abd pain.    PAST MEDICAL & SURGICAL HISTORY:  Diabetic neuropathy  STEMI (ST elevation myocardial infarction)  Diverticulitis  MRSA bacteremia  History of celiac disease  CHF (congestive heart failure): EF ~ 25%  HTN (hypertension)  Diabetes: on insulin pump  Blood clot due to device, implant, or graft: was on blood thinners  HLD (hyperlipidemia)  Osteoarthritis  Atherosclerosis of coronary artery: CAD (coronary artery disease)  Status post percutaneous transluminal coronary angioplasty: in 2012  History of open reduction and internal fixation (ORIF) procedure  Surgery, elective: Right shoulder  Surgery, elective: right knee wound debridement  S/P TKR (total knee replacement), right: with infection Mrsa   per pt he was cleared from MRSA infection  S/P CABG x 1: 2018  Stented coronary artery: 10/18 heart attack  INFERIOR WALL MI  Other postprocedural status: Fixation hardware in foot LEFT          MEDICATIONS  (STANDING):  aspirin enteric coated 81 milliGRAM(s) Oral two times a day  atorvastatin 40 milliGRAM(s) Oral at bedtime  dextrose 5%. 1000 milliLiter(s) (50 mL/Hr) IV Continuous <Continuous>  dextrose 50% Injectable 12.5 Gram(s) IV Push once  dextrose 50% Injectable 25 Gram(s) IV Push once  dextrose 50% Injectable 25 Gram(s) IV Push once  gabapentin 100 milliGRAM(s) Oral every 8 hours  insulin lispro (HumaLOG) corrective regimen sliding scale   SubCutaneous Before meals and at bedtime  metoprolol succinate ER 25 milliGRAM(s) Oral daily  pantoprazole    Tablet 40 milliGRAM(s) Oral before breakfast  prasugrel 10 milliGRAM(s) Oral daily  rifAMPin 300 milliGRAM(s) Oral every 12 hours  silver sulfADIAZINE 1% Cream 1 Application(s) Topical two times a day  spironolactone 25 milliGRAM(s) Oral two times a day  tamsulosin 0.4 milliGRAM(s) Oral at bedtime  vancomycin  IVPB 1000 milliGRAM(s) IV Intermittent every 12 hours    MEDICATIONS  (PRN):  acetaminophen   Tablet .. 650 milliGRAM(s) Oral every 6 hours PRN Temp greater or equal to 38C (100.4F), Mild Pain (1 - 3)  cyclobenzaprine 5 milliGRAM(s) Oral three times a day PRN Muscle Spasm  dextrose 40% Gel 15 Gram(s) Oral once PRN Blood Glucose LESS THAN 70 milliGRAM(s)/deciliter  glucagon  Injectable 1 milliGRAM(s) IntraMuscular once PRN Glucose LESS THAN 70 milligrams/deciliter  HYDROmorphone   Tablet 2 milliGRAM(s) Oral every 4 hours PRN Moderate Pain (4 - 6)  HYDROmorphone   Tablet 4 milliGRAM(s) Oral every 4 hours PRN Severe Pain (7 - 10)  HYDROmorphone  Injectable 0.5 milliGRAM(s) IV Push every 4 hours PRN breakthrough pain      Allergies    ACE inhibitors (Hives)  carvedilol (Other)  enalapril (Hives)  Entresto (Other)    Intolerances        SOCIAL HISTORY:  Lives on [place] with his wife.  Has adult daughter.  Worked as paramedic supervisor.  No tobacco, rare alcohol, no recreational drug use.  Has dog, no other animal exposures, no recent travel.    FAMILY HISTORY:  Family history of allergies: daughter  Family history of heart disease (Mother)      Vital Signs Last 24 Hrs  T(C): 36.6 (02 Sep 2020 16:50), Max: 37 (02 Sep 2020 09:00)  T(F): 97.9 (02 Sep 2020 16:50), Max: 98.6 (02 Sep 2020 09:00)  HR: 81 (02 Sep 2020 16:50) (72 - 86)  BP: 100/59 (02 Sep 2020 16:50) (91/56 - 111/57)  BP(mean): 74 (02 Sep 2020 04:38) (74 - 74)  RR: 20 (02 Sep 2020 16:50) (16 - 20)  SpO2: 99% (02 Sep 2020 16:50) (96% - 100%)    PE:  Alert, no distress  HEENT:  NC, PERRL, sclerae anicteric, conjunctivae clear, EOMI.  Sinuses nontender, no nasal exudate.  No buccal or pharyngeal lesions, erythema or exudate  Neck:  Supple, no adenopathy  Lungs:  Clear to auscultation  Cor:  RRR, S1, S2, no murmur appreciated  Abd:  Symmetric, normoactive BS.  ~1.5 cm induration surrounding RUQ scar, no drainage. Soft, nontender, no masses, guarding or rebound.  Liver and spleen not enlarged  Extrem:  R knee incision with 5 X 2 cm area of eschar, no surrounding erythema or warmth.  LLE~11 X 8 cm ulcer with fibrinous base, no purulence, no surrounding erythema.  Skin:  No rashes.    LABS:                        8.3    8.41  )-----------( 447      ( 02 Sep 2020 05:51 )             29.9     09-02    135  |  99  |  30<H>  ----------------------------<  197<H>  4.4   |  26  |  1.16    Ca    9.0      02 Sep 2020 05:51    TPro  7.4  /  Alb  3.6  /  TBili  0.4  /  DBili  x   /  AST  15  /  ALT  13  /  AlkPhos  176<H>  09-01    Sedimentation Rate, Erythrocyte: 45 mm/Hr (09.01.20 @ 17:27)  C-Reactive Protein, Serum: 3.13 mg/dL (09.01.20 @ 17:27)        MICROBIOLOGY:  Blood cultures:  9/1 X 2 - NGTD  COVID-19 PCR 9/1 ND    RADIOLOGY & ADDITIONAL STUDIES:  < from: Xray Knee 1 or 2 Views, Right (09.01.20 @ 17:48) >  Findings:    Right knee cement spacer with intramedullary daniela extending from the distal femur diaphysis to the proximal tibial diaphysis are again noted. No loosening is visualized. No acute fracture or dislocation is visualized. There is mild soft tissue swelling. No soft tissue gas visualized. There are vascular calcifications.    Impression:    Right knee cement spacer again noted, grossly unchanged in appearance from 7/28/2020.    < end of copied text >    < from: US Abdomen Limited (09.02.20 @ 09:57) >  INTERPRETATION:  RIGHT UPPER QUADRANT ULTRASOUND dated 9/2/2020 9:57 AM    INDICATION: Right upper quadrant pain. History of cholecystostomy drain post removal May 2020.    PRIOR STUDIES: Ultrasound abdomen dated 2/26/2020.    Findings: Limited assessment due to artifact from bowel gas and body habitus.    Liver: Enlarged measuring 19.5 cm in craniocaudal dimension. Normal echogenicity. There are no focal hepatic lesions.    Bile ducts: There is no intrahepatic biliary ductal dilatation. The common duct is dilated, measuring?0.8 cm.    Gallbladder: Suboptimally assessed gallbladder due to contracted status noting there is intraluminal echogenic material posterior beam attenuation. No pericholecystic fluid.    Pancreas: The visualized portions appear normal.    Right kidney: The right kidney is normal in size, measuring?12.7 cm in length. There is normal right renal parenchymal thickness and echogenicity.? There is no right hydronephrosis.? No renal mass is identified. No renal stone is seen.    Ascites: There is no ascites in the right upper quadrant.    Vessels: The proximal portions of the aorta and inferior vena cava are unremarkable. Normal hepatopetal blood flow demonstrated within main portal vein. Hepatic veins are patent.    Impression:??  1.  Cholelithiasis. Contracted gallbladder limiting assessment.  2.  Since February 27, 2020, increased dilated common bile duct up to 0.8 cm. No biliary stones detected and no intrahepatic biliary dilatation. MRCP might be considered if clinically indicated.  3.  Hepatomegaly.    < end of copied text >

## 2020-09-02 NOTE — PROGRESS NOTE ADULT - SUBJECTIVE AND OBJECTIVE BOX
Ortho Note    Pt seen and examined. Reports pain as moderately controlled.  Denies CP, SOB, N/V, numbness/tingling     Vital Signs Last 24 Hrs  T(C): 36.6 (09-02-20 @ 12:00), Max: 37 (09-02-20 @ 09:00)  T(F): 97.9 (09-02-20 @ 12:00), Max: 98.6 (09-02-20 @ 09:00)  HR: 79 (09-02-20 @ 12:00) (72 - 81)  BP: 97/65 (09-02-20 @ 12:00) (94/54 - 102/65)  BP(mean): --  RR: 20 (09-02-20 @ 12:00) (18 - 20)  SpO2: 98% (09-02-20 @ 12:00) (98% - 98%)  AVSS    focused exam:  General: Pt Alert and oriented, NAD    RLE:  R knee mildly swollen; no erythema; no drainage; R knee flexion limited- which is baseline since July procedures  DSG- R knee healing surgical incision from 7/22 LEANDER  Pulses: feet cool to touch; at baseline; no edema  Sensation: SILT to baseline (diminished sensation- h/o severe neuropathy; follows with vascular Dr. Malloy outpatient)  Motor: 5/5 EHL/FHL/TA/GS    LLE- open healing vascular wound; wound bed light pink; no erythema, odor, or purulent drainage   Pulses: feet cool to touch; at baseline; no edema  Sensation: SILT to baseline (diminished sensation- h/o severe neuropathy; follows with vascular Dr. Malloy outpatient)  Motor: 5/5 EHL/FHL/TA/GS                          8.3    8.41  )-----------( 447      ( 02 Sep 2020 05:51 )             29.9   02 Sep 2020 05:51    135    |  99     |  30     ----------------------------<  197    4.4     |  26     |  1.16       TPro  7.4    /  Alb  3.6    /  TBili  0.4    /  DBili  x      /  AST  15     /  ALT  13     /  AlkPhos  176    01 Sep 2020 20:11      A/P: 65yMale admitted for R knee pain, drainage s/p right knee arthroscopic I+D 7/17, right knee explant and abx spacer 7/22 with Dr. Castro;  and debridement of LLE wound with wound vac placement on 7/30 by Dr. Bowen. Was original d/c'd home on 8/10/20 with PICC and vancomycin IV q12h + rifampin  - H+H 8.3/29.9- patient has chronic anemia and is at baseline (d/c'd with 7.5/26.2 on 8/10); asymptomatic; will continue to monitor CBC  - ESR, CRP elevated- improving from last admission; continuing vancomycin 1g q12h; will obtain troph level tomorrow AM. Appreciate ID recs  - will obtain CMP in AM as patient has h/o elevated liver enzymes on rifampin in the past  - R knee aspiration  minimal-  crystals resulted; RN was notified cell count was clotted but no notification to ortho team. Sample contaminated prior to cultures being able to be run on sample. Will continue to monitor patient clinical status. Afebrile with no leukocytosis.   - R biliary drain site from February 2020- no erythema or drainage although patient reports drainage at home; surgery consulted and RUQ u/s complete. Denies nausea, or abd pain. Appreciate surgery recs; may need cholecystectomy but unclear on this admission or in near future  - LLE vascular wound- had wound vac placement with plastics in July 2020. Follows up outpatient with Dr. Bowen. Dr. Bowen notified of patient's admission. Spoke with Dr. Bowen who recommends daily WTD dressings. Will reconsult if integrity of wound changes.  - h/o diabetic neuropathy/ poor circulation- Follows with Dr. Malloy (vascular) outpatient; will consult for clearance if necessary  - h/o cardiac stents/ EF 25%- follows with Dr. Caldera (cardiology); consulted for recs on this admission. Patient recently thinks his medications were adjusted but cannot remember which ones; continuing home regimen for now  - Pain Control- was recently switched to dilaudid PO and gabapentin 100 tid outpatient by Dr. Castro; updated medications; appreciate pain management recs  - DVT ppx: ASA + effient (home regimen)  - PT, WBS: WBAT  - OOB for meals as tolerated, I/S  - continue bowel regimen  - dispo: TBD    Ortho Pager [phone] Ortho Note    Pt seen and examined. Reports pain as moderately controlled.  Denies CP, SOB, N/V, numbness/tingling     Vital Signs Last 24 Hrs  T(C): 36.6 (09-02-20 @ 12:00), Max: 37 (09-02-20 @ 09:00)  T(F): 97.9 (09-02-20 @ 12:00), Max: 98.6 (09-02-20 @ 09:00)  HR: 79 (09-02-20 @ 12:00) (72 - 81)  BP: 97/65 (09-02-20 @ 12:00) (94/54 - 102/65)  BP(mean): --  RR: 20 (09-02-20 @ 12:00) (18 - 20)  SpO2: 98% (09-02-20 @ 12:00) (98% - 98%)  AVSS    focused exam:  General: Pt Alert and oriented, NAD    RLE:  R knee mildly swollen; no erythema; no drainage; R knee flexion limited- which is baseline since July procedures  DSG- R knee healing surgical incision from 7/22 LEANDER  Pulses: feet cool to touch; at baseline; no edema  Sensation: SILT to baseline (diminished sensation- h/o severe neuropathy; follows with vascular Dr. Malloy outpatient)  Motor: 5/5 EHL/FHL/TA/GS    LLE- open healing vascular wound; wound bed light pink; no erythema, odor, or purulent drainage   Pulses: feet cool to touch; at baseline; no edema  Sensation: SILT to baseline (diminished sensation- h/o severe neuropathy; follows with vascular Dr. Malloy outpatient)  Motor: 5/5 EHL/FHL/TA/GS                          8.3    8.41  )-----------( 447      ( 02 Sep 2020 05:51 )             29.9   02 Sep 2020 05:51    135    |  99     |  30     ----------------------------<  197    4.4     |  26     |  1.16       TPro  7.4    /  Alb  3.6    /  TBili  0.4    /  DBili  x      /  AST  15     /  ALT  13     /  AlkPhos  176    01 Sep 2020 20:11      A/P: 65yMale admitted for R knee pain, drainage s/p right knee arthroscopic I+D 7/17, right knee explant and abx spacer 7/22 with Dr. Castro;  and debridement of LLE wound with wound vac placement on 7/30 by Dr. Bowen. Was original d/c'd home on 8/10/20 with PICC and vancomycin IV q12h + rifampin  - H+H 8.3/29.9- patient has chronic anemia and is at baseline (d/c'd with 7.5/26.2 on 8/10); asymptomatic; will continue to monitor CBC  - ESR, CRP elevated- improving from last admission; continuing vancomycin 1g q12h; will obtain troph level tomorrow AM. Appreciate ID recs  - will obtain CMP in AM as patient has h/o elevated liver enzymes on rifampin in the past  - R knee aspiration  minimal-  crystals resulted; RN was notified cell count was clotted but no notification to ortho team. Sample contaminated prior to cultures being able to be run on sample. Will continue to monitor patient clinical status. Afebrile with no leukocytosis.   - R biliary drain site abcess from February 2020- no erythema or drainage although patient reports drainage at home; surgery consulted and RUQ u/s complete. Denies nausea, or abd pain. Appreciate surgery recs; may need cholecystectomy but unclear on this admission or in near future  - LLE vascular wound- had wound vac placement with plastics in July 2020. Follows up outpatient with Dr. Bowen. Dr. Bowen notified of patient's admission. Spoke with Dr. Bowen who recommends daily WTD dressings. Will reconsult if integrity of wound changes.  - h/o diabetic neuropathy/ poor circulation- Follows with Dr. Malloy (vascular) outpatient; will consult for clearance if necessary  - h/o cardiac stents/ EF 25%- follows with Dr. Caldera (cardiology); consulted for recs on this admission. Patient recently thinks his medications were adjusted but cannot remember which ones; continuing home regimen for now  - diabetes with hyperglycemia- appreciate internal medicine recs for glucose control inpatient  - Pain Control- was recently switched to dilaudid PO and gabapentin 100 tid outpatient by Dr. Castro; updated medications; appreciate pain management recs  - DVT ppx: ASA + effient (home regimen)  - PT, WBS: WBAT  - OOB for meals as tolerated, I/S  - continue bowel regimen  - dispo: TBD    Ortho Pager [phone]

## 2020-09-02 NOTE — CONSULT NOTE ADULT - SUBJECTIVE AND OBJECTIVE BOX
Pain Management Consult Note - [facility] [phone]    Chief Complaint:    HPI:      Pain is ___ sharp ____dull ___burning ___achy ___ Intensity: ____ mild ___mod ___severe     Location ____surgical site ____cervical _____lumbar ____abd ____upper ext____lower ext    Worse with ____activity ____movement _____physical therapy___ Rest    Improved with ____medication ____rest ____physical therapy    ROS: Const:  ___febrile   Eyes:___ENT:___CV: ___chest pain  Resp: ____sob  GI:___nausea ___vomiting ___abd pain ___npo ___clears __full diet __bm  :___ Musk: ___pain ___spasm  Skin:___ Neuro:  ___sedation___confusion___ numbness ___weakness ___paresth  Psych:__anxiety  Endo:___ Heme:___Allergy:_________, ___all others reviewed and negative    PAST MEDICAL & SURGICAL HISTORY:  Diabetic neuropathy  STEMI (ST elevation myocardial infarction)  Diverticulitis  MRSA bacteremia  History of celiac disease  CHF (congestive heart failure): EF ~ 25%  HTN (hypertension)  Diabetes: on insulin pump  Blood clot due to device, implant, or graft: was on blood thinners  HLD (hyperlipidemia)  Osteoarthritis  Atherosclerosis of coronary artery: CAD (coronary artery disease)  Status post percutaneous transluminal coronary angioplasty: in 2012  History of open reduction and internal fixation (ORIF) procedure  Surgery, elective: Right shoulder  Surgery, elective: right knee wound debridement  S/P TKR (total knee replacement), right: with infection Mrsa   per pt he was cleared from MRSA infection  S/P CABG x 1: 2018  Stented coronary artery: 10/18 heart attack  INFERIOR WALL MI  Other postprocedural status: Fixation hardware in foot LEFT      SH: ___Tobacco   ___Alcohol                          FH:FAMILY HISTORY:  Family history of allergies: daughter  Family history of heart disease (Mother)      acetaminophen   Tablet .. 650 milliGRAM(s) Oral every 6 hours PRN  aspirin enteric coated 81 milliGRAM(s) Oral two times a day  atorvastatin 40 milliGRAM(s) Oral at bedtime  cyclobenzaprine 5 milliGRAM(s) Oral three times a day PRN  dextrose 40% Gel 15 Gram(s) Oral once PRN  dextrose 5%. 1000 milliLiter(s) IV Continuous <Continuous>  dextrose 50% Injectable 12.5 Gram(s) IV Push once  dextrose 50% Injectable 25 Gram(s) IV Push once  dextrose 50% Injectable 25 Gram(s) IV Push once  gabapentin 100 milliGRAM(s) Oral every 8 hours  glucagon  Injectable 1 milliGRAM(s) IntraMuscular once PRN  HYDROmorphone   Tablet 2 milliGRAM(s) Oral every 4 hours PRN  HYDROmorphone   Tablet 4 milliGRAM(s) Oral every 4 hours PRN  HYDROmorphone  Injectable 0.5 milliGRAM(s) IV Push every 4 hours PRN  insulin lispro (HumaLOG) corrective regimen sliding scale   SubCutaneous Before meals and at bedtime  metoprolol succinate ER 25 milliGRAM(s) Oral daily  pantoprazole    Tablet 40 milliGRAM(s) Oral before breakfast  prasugrel 10 milliGRAM(s) Oral daily  rifAMPin 300 milliGRAM(s) Oral every 12 hours  silver sulfADIAZINE 1% Cream 1 Application(s) Topical two times a day  spironolactone 25 milliGRAM(s) Oral two times a day  tamsulosin 0.4 milliGRAM(s) Oral at bedtime  vancomycin  IVPB 1000 milliGRAM(s) IV Intermittent every 12 hours      T(C): 36.1 (09-02-20 @ 15:00), Max: 37 (09-02-20 @ 09:00)  HR: 77 (09-02-20 @ 15:00) (72 - 88)  BP: 111/57 (09-02-20 @ 15:00) (91/56 - 111/57)  RR: 16 (09-02-20 @ 15:00) (16 - 20)  SpO2: 96% (09-02-20 @ 15:00) (96% - 100%)  Wt(kg): --    T(C): 36.1 (09-02-20 @ 15:00), Max: 37 (09-02-20 @ 09:00)  HR: 77 (09-02-20 @ 15:00) (72 - 88)  BP: 111/57 (09-02-20 @ 15:00) (91/56 - 111/57)  RR: 16 (09-02-20 @ 15:00) (16 - 20)  SpO2: 96% (09-02-20 @ 15:00) (96% - 100%)  Wt(kg): --    T(C): 36.1 (09-02-20 @ 15:00), Max: 37 (09-02-20 @ 09:00)  HR: 77 (09-02-20 @ 15:00) (72 - 88)  BP: 111/57 (09-02-20 @ 15:00) (91/56 - 111/57)  RR: 16 (09-02-20 @ 15:00) (16 - 20)  SpO2: 96% (09-02-20 @ 15:00) (96% - 100%)  Wt(kg): --      PHYSICAL EXAM:  Gen Appearance: x___no acute distress _x__appropriate        Neuro: _x__SILT feet____ EOM Intact Psych: AAOX__3, x___mood/affect appropriate        Eyes: __x_conjunctiva WNL  __x__ Pupils equal and round        ENT: x___ears and nose atraumatic_x__ Hearing grossly intact        Neck: _x__trachea midline, no visible masses ___thyroid without palpable mass    Resp: _x__Nml WOB____No tactile fremitus ___clear to auscultation    Cardio: ___extremities free from edema __x__pedal pulses palpable    GI/Abdomen: __x_soft ___x__ Nontender___x___Nondistended_____HSM    Lymphatic: ___no palpable nodes in neck  ___no palpable nodes calves and feet    Skin/Wound: ___Incision, ___Dressing c/d/i,   ____surrounding tissues soft,  ___drain/chest tube present____    Muscular: EHL _5__/5  Gastrocnemius_5__/5    ___absent clubbing/cyanosis          ASSESSMENT: This is a 65y old Male with a history of knee pain, diabetic neuropathy, CHF, HTN, MRSA bacteremia, diverticulitis, COPD, hyperkalemia, chronic systolic CHF, osteoarthritis admitted for R knee pain, R knee drainage         A/P: 65yMale admitted for R knee pain, drainage s/p right knee arthroscopic I+D 7/17, right knee explant and abx spacer 7/22 with Dr. Castro;  and debridement of LLE wound with wound vac placement on 7/30 by Dr. Bowen. Was original d/c'd home on 8/10/20 with PICC and vancomycin IV q12h + rifampin  - H+H 8.3/29.9- patient has chronic anemia and is at baseline (d/c'd with 7.5/26.2 on 8/10); asymptomatic; will continue to monitor CBC  - ESR, CRP elevated- improving from last admission; continuing vancomycin 1g q12h; will obtain troph level tomorrow AM. Appreciate ID recs  - will obtain CMP in AM as patient has h/o elevated liver enzymes on rifampin in the past  - R knee aspiration  minimal-  crystals resulted; RN was notified cell count was clotted but no notification to ortho team. Sample contaminated prior to cultures being able to be run on sample. Will continue to monitor patient clinical status. Afebrile with no leukocytosis.   - R biliary drain site abcess from February 2020- no erythema or drainage although patient reports drainage at home; surgery consulted and RUQ u/s complete. Denies nausea, or abd pain. Appreciate surgery recs; may need cholecystectomy but unclear on this admission or in near future  - LLE vascular wound- had wound vac placement with plastics in July 2020. Follows up outpatient with Dr. Bowen. Dr. Bowen notified of patient's admission. Spoke with Dr. Bowen who recommends daily WTD dressings. Will reconsult if integrity of wound changes.  - h/o diabetic neuropathy/ poor circulation- Follows with Dr. Malloy (vascular) outpatient; will consult for clearance if necessary  - h/o cardiac stents/ EF 25%- follows with Dr. Caldera (cardiology); consulted for recs on this admission. Patient recently thinks his medications were adjusted but cannot remember which ones; continuing home regimen for now  - diabetes with hyperglycemia- appreciate internal medicine recs for glucose control inpatient  - Pain Control- was recently switched to dilaudid PO and gabapentin 100 tid outpatient by Dr. Castro; updated medications; appreciate pain management recs  - DVT ppx: ASA + effient (home regimen)  - PT, WBS: WBAT  - OOB for meals as tolerated, I/S  - continue bowel regimen  - dispo: TBD      Recommended Treatment PLAN: Pain Management Consult Note - [facility] [phone]      Chief Complaint: R knee Pain      HPI: Patient seen and examined today. Patient with a history of knee pain, diabetic neuropathy, CHF, HTN, MRSA bacteremia, diverticulitis, COPD, hyperkalemia, chronic systolic CHF, osteoarthritis,  s/p R Revision TKA and antibiotic spacer by Dr. Castro on 7/22, now presenting with R knee pain and swelling x3 days. Patient reports RUQ abdominal pain, R hip pain, R knee pain. Patient sees Dr. Jose Castro and takes Dilaudid 2mg PO 3-4x a day, and Oxycontin 10mg PO BID. Patient denies any secondary side effects from current pain medication regimen. Reviewed pain medication regimen with patient at bedside.        Pain is _x__ sharp ____dull ___burning _x__achy ___ Intensity: ____ mild ___mod ___severe     Location _x___surgical site ____cervical _____lumbar ____abd ____upper ext____lower ext    Worse with _x___activity _x___movement _____physical therapy___ Rest    Improved with _x___medication __x__rest ____physical therapy      ROS: Const:  -___febrile   Eyes:___ENT:___CV: __-_chest pain  Resp: __-__sob  GI:_-__nausea __-_vomiting _x_abd pain ___npo ___clears x__full diet __bm  :___ Musk: _x__pain _-__spasm  Skin:___ Neuro:  _-__flkwhass_-__ogskowxvi_-__ numbness _-__weakness __x_paresth  Psych:_-_anxiety  Endo:___ Heme:___Allergy:_________, _x__all others reviewed and negative      PAST MEDICAL & SURGICAL HISTORY:  Diabetic neuropathy  STEMI (ST elevation myocardial infarction)  Diverticulitis  MRSA bacteremia  History of celiac disease  CHF (congestive heart failure): EF ~ 25%  HTN (hypertension)  Diabetes: on insulin pump  Blood clot due to device, implant, or graft: was on blood thinners  HLD (hyperlipidemia)  Osteoarthritis  Atherosclerosis of coronary artery: CAD (coronary artery disease)  Status post percutaneous transluminal coronary angioplasty: in 2012  History of open reduction and internal fixation (ORIF) procedure  Surgery, elective: Right shoulder  Surgery, elective: right knee wound debridement  S/P TKR (total knee replacement), right: with infection Mrsa   per pt he was cleared from MRSA infection  S/P CABG x 1: 2018  Stented coronary artery: 10/18 heart attack  INFERIOR WALL MI  Other postprocedural status: Fixation hardware in foot LEFT      SH: _-__Tobacco   __-_Alcohol                          FH:FAMILY HISTORY:  Family history of allergies: daughter  Family history of heart disease (Mother)      acetaminophen   Tablet .. 650 milliGRAM(s) Oral every 6 hours PRN  aspirin enteric coated 81 milliGRAM(s) Oral two times a day  atorvastatin 40 milliGRAM(s) Oral at bedtime  cyclobenzaprine 5 milliGRAM(s) Oral three times a day PRN  dextrose 40% Gel 15 Gram(s) Oral once PRN  dextrose 5%. 1000 milliLiter(s) IV Continuous <Continuous>  dextrose 50% Injectable 12.5 Gram(s) IV Push once  dextrose 50% Injectable 25 Gram(s) IV Push once  dextrose 50% Injectable 25 Gram(s) IV Push once  gabapentin 100 milliGRAM(s) Oral every 8 hours  glucagon  Injectable 1 milliGRAM(s) IntraMuscular once PRN  HYDROmorphone   Tablet 2 milliGRAM(s) Oral every 4 hours PRN  HYDROmorphone   Tablet 4 milliGRAM(s) Oral every 4 hours PRN  HYDROmorphone  Injectable 0.5 milliGRAM(s) IV Push every 4 hours PRN  insulin lispro (HumaLOG) corrective regimen sliding scale   SubCutaneous Before meals and at bedtime  metoprolol succinate ER 25 milliGRAM(s) Oral daily  pantoprazole    Tablet 40 milliGRAM(s) Oral before breakfast  prasugrel 10 milliGRAM(s) Oral daily  rifAMPin 300 milliGRAM(s) Oral every 12 hours  silver sulfADIAZINE 1% Cream 1 Application(s) Topical two times a day  spironolactone 25 milliGRAM(s) Oral two times a day  tamsulosin 0.4 milliGRAM(s) Oral at bedtime  vancomycin  IVPB 1000 milliGRAM(s) IV Intermittent every 12 hours      T(C): 36.1 (09-02-20 @ 15:00), Max: 37 (09-02-20 @ 09:00)  HR: 77 (09-02-20 @ 15:00) (72 - 88)  BP: 111/57 (09-02-20 @ 15:00) (91/56 - 111/57)  RR: 16 (09-02-20 @ 15:00) (16 - 20)  SpO2: 96% (09-02-20 @ 15:00) (96% - 100%)  Wt(kg): --    T(C): 36.1 (09-02-20 @ 15:00), Max: 37 (09-02-20 @ 09:00)  HR: 77 (09-02-20 @ 15:00) (72 - 88)  BP: 111/57 (09-02-20 @ 15:00) (91/56 - 111/57)  RR: 16 (09-02-20 @ 15:00) (16 - 20)  SpO2: 96% (09-02-20 @ 15:00) (96% - 100%)  Wt(kg): --    T(C): 36.1 (09-02-20 @ 15:00), Max: 37 (09-02-20 @ 09:00)  HR: 77 (09-02-20 @ 15:00) (72 - 88)  BP: 111/57 (09-02-20 @ 15:00) (91/56 - 111/57)  RR: 16 (09-02-20 @ 15:00) (16 - 20)  SpO2: 96% (09-02-20 @ 15:00) (96% - 100%)  Wt(kg): --      PHYSICAL EXAM:  Gen Appearance: x___no acute distress _x__appropriate        Neuro: _x__SILT feet____ EOM Intact Psych: AAOX__3, x___mood/affect appropriate        Eyes: __x_conjunctiva WNL  __x__ Pupils equal and round        ENT: x___ears and nose atraumatic_x__ Hearing grossly intact        Neck: _x__trachea midline, no visible masses ___thyroid without palpable mass    Resp: _x__Nml WOB____No tactile fremitus ___clear to auscultation    Cardio: ___extremities free from edema __x__pedal pulses palpable    GI/Abdomen: __x_soft ___x__ Nontender___x___Nondistended_____HSM    Lymphatic: ___no palpable nodes in neck  ___no palpable nodes calves and feet    Skin/Wound: ___Incision, ___Dressing c/d/i,   ____surrounding tissues soft,  ___drain/chest tube present____    Muscular: EHL _5__/5  Gastrocnemius_5__/5    ___absent clubbing/cyanosis          ASSESSMENT: This is a 65y old Male with a history of knee pain, diabetic neuropathy, CHF, HTN, MRSA bacteremia, diverticulitis, COPD, hyperkalemia, chronic systolic CHF, osteoarthritis,  s/p R Revision TKA and antibiotic spacer by Dr. Castro on 7/22, now presenting with R knee pain and swelling x3 days.      Recommended Treatment PLAN:  1. Dilaudid 2-4mg PO Q4h prn moderate to severe pain  2. Oxycontin 10mg PO BID  3. Flexeril 5mg PO Q8h prn muscle spasms  4. Gabapentin 100mg PO TID  Plan discussed with Dr. Chavez

## 2020-09-02 NOTE — PROGRESS NOTE ADULT - SUBJECTIVE AND OBJECTIVE BOX
Pt comfortable pain mostly controlled. Complaining of mild RUQ pain and drainage from old perc biliary site.  Denies CP, SOB, N/V, numbness/tingling     Vital Signs Last 24 Hrs  T(C): 36.6 (09-02-20 @ 04:38), Max: 36.7 (09-02-20 @ 00:10)  T(F): 97.8 (09-02-20 @ 04:38), Max: 98 (09-02-20 @ 00:10)  HR: 84 (09-02-20 @ 04:53) (77 - 84)  BP: 102/52 (09-02-20 @ 04:53) (96/63 - 108/52)  BP(mean): 74 (09-02-20 @ 04:38) (74 - 74)  RR: 16 (09-02-20 @ 04:53) (16 - 18)  SpO2: 100% (09-02-20 @ 04:53) (98% - 100%)  AVSS    General: Pt Alert and oriented, NAD  R Knee Gauze DSG C/D/I  L Knee wet-dry DSG C/D/I  Pulses: 2+ distally  Sensation: SILT distally  Motor: EHL/FHL/TA/GS 5/5, unable to flex/extend knee 2/2 spacer         A/P: 65yMale s/p R Revision TKA and antibiotic spacer by Dr. Castro on 7/22, now presenting with R knee pain and swelling x3 days.  - Stable  - Pain Control  - DVT ppx: ASA  - ABx: continue with home abx vanco 1000 q12 and rifampin 300 q12  - ID consult today  - F/u gen surg recs for draining abdominal wound, may need cholecystectomy?  - PT, WBS: WBAT  - F/u knee aspiration cell count  - Monitor blood sugars, was difficult to control during last visit, may need to re-consult endocrine/medicine    Ortho Pager [phone]

## 2020-09-02 NOTE — PATIENT PROFILE ADULT - OVER THE PAST TWO WEEKS HAVE YOU FELT DOWN, DEPRESSED OR HOPELESS?
TURBT, instillation of mitomycin-C intravesical chemotherapy     Surgery scheduling requirements include:  [place] [facility] [place] [facility] [place]  Admission Type: short stay  Time Needed: 60 min   Anesthesia: General  Surgical Assist: no  Co-Surgeon: No  Pre-Op H&P: With PCP  Special Equipment:   Additional Comment: - No ASA for 7-10 days before surgery  - No Coumadin for 5 days before surgery.  - No Plavix for 5 days before surgery.     PREOP cxr, ekg within 30 days of surgery.  Day of surgery:  1. Consent  2. Knee high grzegorz hose and scds octor  3. Ancef 2gm ivpb octor unless PCN allergic, then given cipro 400mg ivpb octor.   no

## 2020-09-03 ENCOUNTER — TRANSCRIPTION ENCOUNTER (OUTPATIENT)
Age: 65
End: 2020-09-03

## 2020-09-03 DIAGNOSIS — Z01.818 ENCOUNTER FOR OTHER PREPROCEDURAL EXAMINATION: ICD-10-CM

## 2020-09-03 LAB
ALBUMIN SERPL ELPH-MCNC: 3.2 G/DL — LOW (ref 3.3–5)
ALP SERPL-CCNC: 167 U/L — HIGH (ref 40–120)
ALT FLD-CCNC: 18 U/L — SIGNIFICANT CHANGE UP (ref 10–45)
ANION GAP SERPL CALC-SCNC: 10 MMOL/L — SIGNIFICANT CHANGE UP (ref 5–17)
AST SERPL-CCNC: 16 U/L — SIGNIFICANT CHANGE UP (ref 10–40)
BILIRUB SERPL-MCNC: 0.5 MG/DL — SIGNIFICANT CHANGE UP (ref 0.2–1.2)
BLD GP AB SCN SERPL QL: NEGATIVE — SIGNIFICANT CHANGE UP
BLD GP AB SCN SERPL QL: NEGATIVE — SIGNIFICANT CHANGE UP
BUN SERPL-MCNC: 35 MG/DL — HIGH (ref 7–23)
CALCIUM SERPL-MCNC: 8.7 MG/DL — SIGNIFICANT CHANGE UP (ref 8.4–10.5)
CHLORIDE SERPL-SCNC: 98 MMOL/L — SIGNIFICANT CHANGE UP (ref 96–108)
CO2 SERPL-SCNC: 25 MMOL/L — SIGNIFICANT CHANGE UP (ref 22–31)
CREAT SERPL-MCNC: 1.2 MG/DL — SIGNIFICANT CHANGE UP (ref 0.5–1.3)
CRP SERPL-MCNC: 2.74 MG/DL — HIGH (ref 0–0.4)
DIGOXIN SERPL-MCNC: <0.3 NG/ML — LOW (ref 0.8–2)
ERYTHROCYTE [SEDIMENTATION RATE] IN BLOOD: 32 MM/HR — HIGH
GLUCOSE BLDC GLUCOMTR-MCNC: 189 MG/DL — HIGH (ref 70–99)
GLUCOSE BLDC GLUCOMTR-MCNC: 194 MG/DL — HIGH (ref 70–99)
GLUCOSE BLDC GLUCOMTR-MCNC: 199 MG/DL — HIGH (ref 70–99)
GLUCOSE BLDC GLUCOMTR-MCNC: 205 MG/DL — HIGH (ref 70–99)
GLUCOSE SERPL-MCNC: 188 MG/DL — HIGH (ref 70–99)
GRAM STN FLD: SIGNIFICANT CHANGE UP
HCT VFR BLD CALC: 29.1 % — LOW (ref 39–50)
HGB BLD-MCNC: 8.2 G/DL — LOW (ref 13–17)
MCHC RBC-ENTMCNC: 19.8 PG — LOW (ref 27–34)
MCHC RBC-ENTMCNC: 28.2 GM/DL — LOW (ref 32–36)
MCV RBC AUTO: 70.1 FL — LOW (ref 80–100)
NRBC # BLD: 0 /100 WBCS — SIGNIFICANT CHANGE UP (ref 0–0)
PLATELET # BLD AUTO: 405 K/UL — HIGH (ref 150–400)
POTASSIUM SERPL-MCNC: 4 MMOL/L — SIGNIFICANT CHANGE UP (ref 3.5–5.3)
POTASSIUM SERPL-SCNC: 4 MMOL/L — SIGNIFICANT CHANGE UP (ref 3.5–5.3)
PROT SERPL-MCNC: 7 G/DL — SIGNIFICANT CHANGE UP (ref 6–8.3)
RBC # BLD: 4.15 M/UL — LOW (ref 4.2–5.8)
RBC # FLD: 19.3 % — HIGH (ref 10.3–14.5)
RH IG SCN BLD-IMP: POSITIVE — SIGNIFICANT CHANGE UP
RH IG SCN BLD-IMP: POSITIVE — SIGNIFICANT CHANGE UP
SODIUM SERPL-SCNC: 133 MMOL/L — LOW (ref 135–145)
VANCOMYCIN TROUGH SERPL-MCNC: 19.6 UG/ML — SIGNIFICANT CHANGE UP (ref 10–20)
VANCOMYCIN TROUGH SERPL-MCNC: 26.6 UG/ML — CRITICAL HIGH (ref 10–20)
WBC # BLD: 8.22 K/UL — SIGNIFICANT CHANGE UP (ref 3.8–10.5)
WBC # FLD AUTO: 8.22 K/UL — SIGNIFICANT CHANGE UP (ref 3.8–10.5)

## 2020-09-03 PROCEDURE — 74160 CT ABDOMEN W/CONTRAST: CPT | Mod: 26

## 2020-09-03 PROCEDURE — 73590 X-RAY EXAM OF LOWER LEG: CPT | Mod: 26,RT

## 2020-09-03 PROCEDURE — 73502 X-RAY EXAM HIP UNI 2-3 VIEWS: CPT | Mod: 26,RT

## 2020-09-03 PROCEDURE — 73552 X-RAY EXAM OF FEMUR 2/>: CPT | Mod: 26,RT

## 2020-09-03 PROCEDURE — 93010 ELECTROCARDIOGRAM REPORT: CPT

## 2020-09-03 PROCEDURE — 99223 1ST HOSP IP/OBS HIGH 75: CPT | Mod: GC

## 2020-09-03 PROCEDURE — 99232 SBSQ HOSP IP/OBS MODERATE 35: CPT | Mod: GC

## 2020-09-03 RX ORDER — CHLORHEXIDINE GLUCONATE 213 G/1000ML
1 SOLUTION TOPICAL ONCE
Refills: 0 | Status: COMPLETED | OUTPATIENT
Start: 2020-09-04 | End: 2020-09-04

## 2020-09-03 RX ORDER — SPIRONOLACTONE 25 MG/1
25 TABLET, FILM COATED ORAL DAILY
Refills: 0 | Status: DISCONTINUED | OUTPATIENT
Start: 2020-09-04 | End: 2020-09-04

## 2020-09-03 RX ORDER — ROSUVASTATIN CALCIUM 5 MG/1
10 TABLET ORAL AT BEDTIME
Refills: 0 | Status: DISCONTINUED | OUTPATIENT
Start: 2020-09-03 | End: 2020-09-04

## 2020-09-03 RX ORDER — POVIDONE-IODINE 5 %
1 AEROSOL (ML) TOPICAL ONCE
Refills: 0 | Status: COMPLETED | OUTPATIENT
Start: 2020-09-04 | End: 2020-09-04

## 2020-09-03 RX ORDER — OXYCODONE HYDROCHLORIDE 5 MG/1
10 TABLET ORAL EVERY 4 HOURS
Refills: 0 | Status: DISCONTINUED | OUTPATIENT
Start: 2020-09-03 | End: 2020-09-04

## 2020-09-03 RX ORDER — OXYCODONE HYDROCHLORIDE 5 MG/1
5 TABLET ORAL EVERY 4 HOURS
Refills: 0 | Status: DISCONTINUED | OUTPATIENT
Start: 2020-09-03 | End: 2020-09-04

## 2020-09-03 RX ORDER — SODIUM CHLORIDE 9 MG/ML
1000 INJECTION, SOLUTION INTRAVENOUS
Refills: 0 | Status: DISCONTINUED | OUTPATIENT
Start: 2020-09-04 | End: 2020-09-04

## 2020-09-03 RX ADMIN — HYDROMORPHONE HYDROCHLORIDE 0.5 MILLIGRAM(S): 2 INJECTION INTRAMUSCULAR; INTRAVENOUS; SUBCUTANEOUS at 21:52

## 2020-09-03 RX ADMIN — SPIRONOLACTONE 25 MILLIGRAM(S): 25 TABLET, FILM COATED ORAL at 07:48

## 2020-09-03 RX ADMIN — GABAPENTIN 100 MILLIGRAM(S): 400 CAPSULE ORAL at 06:08

## 2020-09-03 RX ADMIN — Medication 2: at 17:59

## 2020-09-03 RX ADMIN — HYDROMORPHONE HYDROCHLORIDE 0.5 MILLIGRAM(S): 2 INJECTION INTRAMUSCULAR; INTRAVENOUS; SUBCUTANEOUS at 06:08

## 2020-09-03 RX ADMIN — HYDROMORPHONE HYDROCHLORIDE 0.5 MILLIGRAM(S): 2 INJECTION INTRAMUSCULAR; INTRAVENOUS; SUBCUTANEOUS at 13:00

## 2020-09-03 RX ADMIN — PANTOPRAZOLE SODIUM 40 MILLIGRAM(S): 20 TABLET, DELAYED RELEASE ORAL at 06:08

## 2020-09-03 RX ADMIN — Medication 2: at 21:52

## 2020-09-03 RX ADMIN — HYDROMORPHONE HYDROCHLORIDE 0.5 MILLIGRAM(S): 2 INJECTION INTRAMUSCULAR; INTRAVENOUS; SUBCUTANEOUS at 06:50

## 2020-09-03 RX ADMIN — GABAPENTIN 100 MILLIGRAM(S): 400 CAPSULE ORAL at 21:37

## 2020-09-03 RX ADMIN — Medication 1 APPLICATION(S): at 06:08

## 2020-09-03 RX ADMIN — GABAPENTIN 100 MILLIGRAM(S): 400 CAPSULE ORAL at 15:37

## 2020-09-03 RX ADMIN — HYDROMORPHONE HYDROCHLORIDE 0.5 MILLIGRAM(S): 2 INJECTION INTRAMUSCULAR; INTRAVENOUS; SUBCUTANEOUS at 22:20

## 2020-09-03 RX ADMIN — CHLORHEXIDINE GLUCONATE 1 APPLICATION(S): 213 SOLUTION TOPICAL at 12:49

## 2020-09-03 RX ADMIN — Medication 4: at 07:48

## 2020-09-03 RX ADMIN — ROSUVASTATIN CALCIUM 10 MILLIGRAM(S): 5 TABLET ORAL at 22:18

## 2020-09-03 RX ADMIN — TAMSULOSIN HYDROCHLORIDE 0.4 MILLIGRAM(S): 0.4 CAPSULE ORAL at 21:38

## 2020-09-03 RX ADMIN — Medication 2: at 12:43

## 2020-09-03 RX ADMIN — Medication 50 MILLIGRAM(S): at 00:34

## 2020-09-03 RX ADMIN — Medication 81 MILLIGRAM(S): at 06:08

## 2020-09-03 RX ADMIN — Medication 1 APPLICATION(S): at 18:19

## 2020-09-03 RX ADMIN — HYDROMORPHONE HYDROCHLORIDE 0.5 MILLIGRAM(S): 2 INJECTION INTRAMUSCULAR; INTRAVENOUS; SUBCUTANEOUS at 12:44

## 2020-09-03 NOTE — PHYSICAL THERAPY INITIAL EVALUATION ADULT - CRITERIA FOR SKILLED THERAPEUTIC INTERVENTIONS
rehab potential/impairments found/functional limitations in following categories/therapy frequency/risk reduction/prevention/anticipated discharge recommendation

## 2020-09-03 NOTE — CONSULT NOTE ADULT - SUBJECTIVE AND OBJECTIVE BOX
Patient is a 65y old  Male who presents with a chief complaint of R Knee Swelling (03 Sep 2020 16:36)      HPI:  Juanito Blas is a 64y/o M with PMHx of CAD (s/p CABG 2018), CHF (EF 25%) Diabetes, HLD, HTN, R Revision Total Knee Arthroplasty with Antibiotic Spacer on 7/22 by Dr. Castro for Prosthetic Joint Infection, presents with 2-3 days of worsening R knee pain and swelling. Pt denies any trauma to R knee. Pt denies any numbness/tingling. Notes minimal drainage from incision, no purulence. Pt denies any recent illness. Pt was discharged last hospital visit 3 weeks ago after multiple revision knee surgeries complicated by prosthetic joint infection and bacteremia. Sent home with PICC line, getting vancomycin 1000 Q12 and Rifampin 300 Q12 daily. Reports compliance with medication. Denies any chest pain/SOB/fevers/chills.     Pt also has chronic large skin defect on L anterior tibia, which was managed by vascular and plastic surgery last visit. Only reports mild pain over this wound, denies any drainage, trauma. (01 Sep 2020 23:12)      PAST MEDICAL & SURGICAL HISTORY:  Diabetic neuropathy  STEMI (ST elevation myocardial infarction)  Diverticulitis  MRSA bacteremia  History of celiac disease  CHF (congestive heart failure): EF ~ 25%  HTN (hypertension)  Diabetes: on insulin pump  Blood clot due to device, implant, or graft: was on blood thinners  HLD (hyperlipidemia)  Osteoarthritis  Atherosclerosis of coronary artery: CAD (coronary artery disease)  Status post percutaneous transluminal coronary angioplasty: in 2012  History of open reduction and internal fixation (ORIF) procedure  Surgery, elective: Right shoulder  Surgery, elective: right knee wound debridement  S/P TKR (total knee replacement), right: with infection Mrsa   per pt he was cleared from MRSA infection  S/P CABG x 1: 2018  Stented coronary artery: 10/18 heart attack  INFERIOR WALL MI  Other postprocedural status: Fixation hardware in foot LEFT      FAMILY HISTORY:  Family history of allergies: daughter  Family history of heart disease (Mother)      SOCIAL HISTORY:  Smoking Status: [ ] Current, [ ] Former, [ ] Never  Pack Years:    MEDICATIONS:  Pulmonary:    Antimicrobials:  rifAMPin 300 milliGRAM(s) Oral every 12 hours    Anticoagulants:    Onc:    GI/:  pantoprazole    Tablet 40 milliGRAM(s) Oral before breakfast    Endocrine:  dextrose 40% Gel 15 Gram(s) Oral once PRN  dextrose 50% Injectable 12.5 Gram(s) IV Push once  dextrose 50% Injectable 25 Gram(s) IV Push once  dextrose 50% Injectable 25 Gram(s) IV Push once  glucagon  Injectable 1 milliGRAM(s) IntraMuscular once PRN  insulin lispro (HumaLOG) corrective regimen sliding scale   SubCutaneous Before meals and at bedtime  rosuvastatin 10 milliGRAM(s) Oral at bedtime    Cardiac:  metoprolol succinate ER 25 milliGRAM(s) Oral daily  tamsulosin 0.4 milliGRAM(s) Oral at bedtime    Other Medications:  acetaminophen   Tablet .. 650 milliGRAM(s) Oral every 6 hours PRN  chlorhexidine 4% Liquid 1 Application(s) Topical <User Schedule>  cyclobenzaprine 5 milliGRAM(s) Oral three times a day PRN  dextrose 5%. 1000 milliLiter(s) IV Continuous <Continuous>  diphenhydrAMINE 50 milliGRAM(s) Oral every 4 hours PRN  gabapentin 100 milliGRAM(s) Oral every 8 hours  HYDROmorphone  Injectable 0.5 milliGRAM(s) IV Push every 4 hours PRN  oxyCODONE    IR 5 milliGRAM(s) Oral every 4 hours PRN  oxyCODONE    IR 10 milliGRAM(s) Oral every 4 hours PRN  silver sulfADIAZINE 1% Cream 1 Application(s) Topical two times a day  sodium chloride 0.9% lock flush 10 milliLiter(s) IV Push every 1 hour PRN      Allergies    ACE inhibitors (Hives)  carvedilol (Other)  enalapril (Hives)  Entresto (Other)    Intolerances        Review of Systems:   •	General: negative  •	Skin/Breast: negative  •	Ophthalmologic: negative  •	ENMT: negative  •	Respiratory and Thorax: negative  •	Cardiovascular: negative  •	Gastrointestinal: negative  •	Genitourinary: negative  •	Musculoskeletal: negative  •	Neurological: negative  •	Psychiatric: negative  •	Hematology/Lymphatics: negative  •	Endocrine: negative  •	Allergic/Immunologic: negative    Physical Exam:   • Constitutional:	Well-developed, well nourished  • Eyes:	EOMI; PERRL; no drainage or redness  • ENMT:	No oral lesions; no gross abnormalities  • Neck	No bruits; no thyromegaly or nodules  • Breasts:	not examined  • Back:	No deformity or limitation of movement  • Respiratory:	Breath Sounds equal & clear to percussion & auscultation, no accessory muscle use  • Cardiovascular:	Regular rate & rhythm, normal S1, S2; no murmurs, gallops or rubs; no S3, S4  • Gastrointestinal:	Soft, non-tender, no hepatosplenomegaly, normal bowel sounds  • Genitourinary:	not examined  • Rectal: not examined  • Extremities:	No cyanosis, clubbing or edema  • Vascular:	Equal and normal pulses (carotid, femoral, dorsalis pedis)  • Neurologica:l	not examined  • Skin:	No lesions; no rash  • Lymph Nodes:	No lymphadedenopathy  • Musculoskeletal:	No joint pain, swelling or deformity; no limitation of movement      Vital Signs Last 24 Hrs  T(C): 36.7 (03 Sep 2020 16:53), Max: 36.7 (03 Sep 2020 00:31)  T(F): 98.1 (03 Sep 2020 16:53), Max: 98.1 (03 Sep 2020 16:53)  HR: 84 (03 Sep 2020 16:53) (81 - 89)  BP: 110/60 (03 Sep 2020 16:53) (95/53 - 148/90)  BP(mean): --  RR: 17 (03 Sep 2020 16:53) (17 - 18)  SpO2: 95% (03 Sep 2020 16:53) (95% - 100%)    09-02 @ 07:01 - 09-03 @ 07:00  --------------------------------------------------------  IN: 250 mL / OUT: 2160 mL / NET: -1910 mL    09-03 @ 07:01  -  09-03 @ 18:32  --------------------------------------------------------  IN: 0 mL / OUT: 550 mL / NET: -550 mL          LABS:      CBC Full  -  ( 03 Sep 2020 10:21 )  WBC Count : 8.22 K/uL  RBC Count : 4.15 M/uL  Hemoglobin : 8.2 g/dL  Hematocrit : 29.1 %  Platelet Count - Automated : 405 K/uL  Mean Cell Volume : 70.1 fl  Mean Cell Hemoglobin : 19.8 pg  Mean Cell Hemoglobin Concentration : 28.2 gm/dL  Auto Neutrophil # : x  Auto Lymphocyte # : x  Auto Monocyte # : x  Auto Eosinophil # : x  Auto Basophil # : x  Auto Neutrophil % : x  Auto Lymphocyte % : x  Auto Monocyte % : x  Auto Eosinophil % : x  Auto Basophil % : x    09-03    133<L>  |  98  |  35<H>  ----------------------------<  188<H>  4.0   |  25  |  1.20    Ca    8.7      03 Sep 2020 10:21    TPro  7.0  /  Alb  3.2<L>  /  TBili  0.5  /  DBili  x   /  AST  16  /  ALT  18  /  AlkPhos  167<H>  09-03                      RADIOLOGY & ADDITIONAL STUDIES (The following images were personally reviewed):

## 2020-09-03 NOTE — PHYSICAL THERAPY INITIAL EVALUATION ADULT - ADDITIONAL COMMENTS
Pt lives at home with wife, receives some HHA assistance, prior use of DME, 21 steps to enter home, denies hx of falls, worsening functional capacity

## 2020-09-03 NOTE — PROGRESS NOTE ADULT - ASSESSMENT
Assessment: 65M PMHx CAD s/p CABG, CHF, DM, HLD, HTN, acute cholecystitis s/p perc sudhir 2/28, recent R TKA 7/22 presents with R knee infection. General surgery consulted for purulent drainage near prior perc sudhir tube site.    Patient was planning on having GB however this was deferred given other ongoing medical conditions.     Patient currently afebrile, WBC 8, Alk chiara 160 otherwise normal LFTs  No abdominal pain, no drainage could be seen today, ID recommended CT scan that did not show the gall bladder although yesterday the USS showed it    Discussed with Dr. Rubi and chief resident, it is expected not to see gall bladder on CT after per cholecystostomy, as the gall bladder will be scarred and contracted a        Recommendations:  - lap achole can be done once the patient is medically optimized  - if any expressible drainage ID are recommending to Cx  - To confirm the presence of gall bladder in his case MRI/MRCP will possible show the gall bladder  - Surgery will continue to FU

## 2020-09-03 NOTE — PROGRESS NOTE ADULT - ATTENDING COMMENTS
I have reviewed the medical record, including laboratory and radiographic studies, interviewed and examined the patient and discussed the plan with Dr. Noel, the ID Resident and Dr. Castro.  Agree with above.  For OR tomorrow to more clearly define extent of infection and evaluate for femoral OM.  Will continue to follow with you – ID Team 1.

## 2020-09-03 NOTE — PROGRESS NOTE ADULT - SUBJECTIVE AND OBJECTIVE BOX
The patient is complaining of R knee pain, denies abdominal pain, fever, chills , nausea or vomiting says he still see some drainage from the old per sudhir site that we couldn't see or express this morning, and he is asking whether we can open the skin and address the discharge.    MEDICATIONS  (STANDING):  chlorhexidine 4% Liquid 1 Application(s) Topical <User Schedule>  dextrose 5%. 1000 milliLiter(s) (50 mL/Hr) IV Continuous <Continuous>  dextrose 50% Injectable 12.5 Gram(s) IV Push once  dextrose 50% Injectable 25 Gram(s) IV Push once  dextrose 50% Injectable 25 Gram(s) IV Push once  gabapentin 100 milliGRAM(s) Oral every 8 hours  insulin lispro (HumaLOG) corrective regimen sliding scale   SubCutaneous Before meals and at bedtime  metoprolol succinate ER 25 milliGRAM(s) Oral daily  pantoprazole    Tablet 40 milliGRAM(s) Oral before breakfast  rifAMPin 300 milliGRAM(s) Oral every 12 hours  rosuvastatin 10 milliGRAM(s) Oral at bedtime  silver sulfADIAZINE 1% Cream 1 Application(s) Topical two times a day  tamsulosin 0.4 milliGRAM(s) Oral at bedtime    MEDICATIONS  (PRN):  acetaminophen   Tablet .. 650 milliGRAM(s) Oral every 6 hours PRN Temp greater or equal to 38C (100.4F), Mild Pain (1 - 3)  cyclobenzaprine 5 milliGRAM(s) Oral three times a day PRN Muscle Spasm  dextrose 40% Gel 15 Gram(s) Oral once PRN Blood Glucose LESS THAN 70 milliGRAM(s)/deciliter  diphenhydrAMINE 50 milliGRAM(s) Oral every 4 hours PRN Rash and/or Itching  glucagon  Injectable 1 milliGRAM(s) IntraMuscular once PRN Glucose LESS THAN 70 milligrams/deciliter  HYDROmorphone   Tablet 2 milliGRAM(s) Oral every 4 hours PRN Moderate Pain (4 - 6)  HYDROmorphone   Tablet 4 milliGRAM(s) Oral every 4 hours PRN Severe Pain (7 - 10)  HYDROmorphone  Injectable 0.5 milliGRAM(s) IV Push every 4 hours PRN breakthrough pain  sodium chloride 0.9% lock flush 10 milliLiter(s) IV Push every 1 hour PRN Pre/post blood products, medications, blood draw, and to maintain line patency    I&O's Detail    02 Sep 2020 07:01  -  03 Sep 2020 07:00  --------------------------------------------------------  IN:    IV PiggyBack: 250 mL  Total IN: 250 mL    OUT:    Voided: 2160 mL  Total OUT: 2160 mL    Total NET: -1910 mL      03 Sep 2020 07:01  -  03 Sep 2020 15:39  --------------------------------------------------------  IN:  Total IN: 0 mL    OUT:    Voided: 550 mL  Total OUT: 550 mL    Total NET: -550 mL      Vital Signs Last 24 Hrs  T(C): 36.7 (03 Sep 2020 14:18), Max: 36.7 (03 Sep 2020 00:31)  T(F): 98 (03 Sep 2020 14:18), Max: 98 (03 Sep 2020 00:31)  HR: 82 (03 Sep 2020 14:18) (81 - 89)  BP: 97/61 (03 Sep 2020 14:18) (95/53 - 148/90)  BP(mean): --  RR: 17 (03 Sep 2020 14:18) (17 - 20)  SpO2: 100% (03 Sep 2020 14:18) (95% - 100%)    Gen: NAD, resting comfortably in bed  Pulm: Good inspiratory effort, nonlabored breathing  C/V: NSR  Abd: Soft, NT, ND, No guarding, No rebound tenderness. RUQ drain site with: minimal erythema, 3 cm induration, no fluctuance, no purulent drainage was expressed today  Extrem: WWP, no edema                          8.2    8.22  )-----------( 405      ( 03 Sep 2020 10:21 )             29.1   09-03    133<L>  |  98  |  35<H>  ----------------------------<  188<H>  4.0   |  25  |  1.20    Ca    8.7      03 Sep 2020 10:21    TPro  7.0  /  Alb  3.2<L>  /  TBili  0.5  /  DBili  x   /  AST  16  /  ALT  18  /  AlkPhos  167<H>  09-03    < from: CT Abdomen w/ IV Cont (09.03.20 @ 11:44) >  Findings:    Lower chest: Since November 25, 2018 unchanged 0.3 cm noncalcified pulmonary nodule left lower lobe (axial series 3 image 6). Aortic annular calcification. Coronary artery stents or atherosclerotic calcification. Partially imaged cardiac pacing/ICD leads terminating in right atrium and right ventricle.    Liver:  No focal lesion.    Gallbladder/biliary: Interval cholecystectomy. Trace free fluid in gallbladder fossa. No biliary dilatation.    Spleen:  Normal.    Pancreas:  Normal.    Adrenal glands:  Normal.    Kidneys: Increased geographic hypoenhancement predominantly geographic posterior renal cortex without preserved cortical rim. Unchanged nonobstructing left renal calculi, largest 0.6 cm. Unchanged left extrarenal pelvis. No hydronephrosis or visualized hydroureter. Unchanged small cyst right lower pole.    Adenopathy:  No lymphadenopathy in abdomen.    Gastrointestinal tract: No bowel obstruction or pneumoperitoneum.    Vessels: No aortic aneurysm. Mild scattered calcific atherosclerosis of the aorta. Patent portal vein.    Soft tissues: Tubular soft tissue thickening in right upper quadrant abdominal wall (axial series 3 image 31) probably postoperative..    Bones: Multilevel moderate degenerative changes.    Impression:  1.  Since February 27, 2020, increased geographic hypoenhancement of renal parenchyma left posterior kidney absent sparing of the cortex, possibly infectious. No abscess.  2.  Unchanged nonobstructing left renal stones.  3.  Interval cholecystectomy with trace free fluid in gallbladder fossa and tubular soft tissue thickening in right upper quadrant abdominal wall, probably postoperative. No drainable abdominal wall collection.        < end of copied text >

## 2020-09-03 NOTE — PROGRESS NOTE ADULT - SUBJECTIVE AND OBJECTIVE BOX
Ortho Note    Pt comfortable without complaints, pain controlled  Denies CP, SOB, N/V, numbness/tingling     Vital Signs Last 24 Hrs  T(C): 36.7 (09-03-20 @ 14:18), Max: 36.7 (09-03-20 @ 14:18)  T(F): 98 (09-03-20 @ 14:18), Max: 98 (09-03-20 @ 14:18)  HR: 82 (09-03-20 @ 14:18) (82 - 82)  BP: 97/61 (09-03-20 @ 14:18) (97/61 - 97/61)  BP(mean): --  RR: 17 (09-03-20 @ 14:18) (17 - 17)  SpO2: 100% (09-03-20 @ 14:18) (100% - 100%)  AVSS    RLE:  R knee mildly swollen; no erythema; no drainage; R knee flexion limited- which is baseline since July procedures  DSG- R knee healing surgical incision from 7/22 LEANDER  Pulses: feet cool to touch; at baseline; no edema  Sensation: SILT to baseline (diminished sensation- h/o severe neuropathy; follows with vascular Dr. Malloy outpatient)  Motor: 5/5 EHL/FHL/TA/GS    LLE- open healing vascular wound; wound bed light pink; no erythema, odor, or purulent drainage   Pulses: feet cool to touch; at baseline; no edema  Sensation: SILT to baseline (diminished sensation- h/o severe neuropathy; follows with vascular Dr. Malloy outpatient)  Motor: 5/5 EHL/FHL/TA/GS                        8.2    8.22  )-----------( 405      ( 03 Sep 2020 10:21 )             29.1   03 Sep 2020 10:21    133    |  98     |  35     ----------------------------<  188    4.0     |  25     |  1.20     Ca    8.7        03 Sep 2020 10:21    TPro  7.0    /  Alb  3.2    /  TBili  0.5    /  DBili  x      /  AST  16     /  ALT  18     /  AlkPhos  167    03 Sep 2020 10:21        A/P: 65yMale admitted for R knee pain, drainage s/p right knee arthroscopic I+D 7/17, right knee explant and abx spacer 7/22 with Dr. Castro;  and debridement of LLE wound with wound vac placement on 7/30 by Dr. Bowen. Was original d/c'd home on 8/10/20 with PICC and vancomycin IV q12h + rifampin  - H+H 8.2/29.1- patient has chronic anemia and is at baseline (d/c'd with 7.5/26.2 on 8/10); asymptomatic; will continue to monitor CBC  - vanco troph supratherapeutic at 26.6- 10am dose held; troph at 10pm to be drawn. If troph <20, will continue with 1.25g q24h, if >20 will redraw troph at 10am tomorrow morning  - will continue daily CMPs as patient has h/o elevated liver enzymes on rifampin in the past; atorvastatin d to rosuvastatin  - R knee aspiration  minimal-  crystals resulted; RN was notified cell count was clotted but no notification to ortho team. Sample contaminated prior to cultures being able to be run on sample. Will continue to monitor patient clinical status. Afebrile with no leukocytosis.   - R biliary drain site abcess from February 2020- no erythema. Today patient with drainage, but was found aggravating drain site upon admission. Expressed drainage and sent for cultures as per ID recs. RUQ u/s complete. CT abdomen done today with gallbladder not visible. Will obtain MRI as per Surgery team 4. ; may need cholecystectomy  in near future; will appreciate general surgery team 4 recs  - LLE vascular wound- had wound vac placement with plastics in July 2020. Follows up outpatient with Dr. Bowen. Dr. Bowen notified of patient's admission. Spoke with Dr. Bowen who recommends daily WTD dressings. Will reconsult if integrity of wound changes.  - h/o diabetic neuropathy/ poor circulation- Follows with Dr. Malloy (vascular) outpatient; will consult for clearance if necessary  - h/o cardiac stents/ EF 25%- follows with Dr. Caldera (cardiology); consulted for recs on this admission. Patient recently thinks his medications were adjusted but cannot remember which ones; continuing home regimen for now  - diabetes with hyperglycemia- appreciate internal medicine recs for glucose control inpatient  - Pain Control- was recently switched to dilaudid PO and gabapentin 100 tid outpatient by Dr. Castro; updated medications; appreciate pain management recs  - DVT ppx: anticoagulation held for OR, continue SCDs  - PT, WBS: WBAT  - OOB for meals as tolerated, I/S  - continue bowel regimen  - NPO, IVF at midnight  - pre-op clearance from cardiology and internal medicine  - dispo: possible OR with Dr. Castro tomorrow    Ortho Pager [phone]

## 2020-09-03 NOTE — CONSULT NOTE ADULT - PROBLEM SELECTOR RECOMMENDATION 9
The patient is on rifampin at this point. Patient still has a PICC line. Patient is for the OR for removal of the hardware on the right hip Monitor hemoglobin. Hold transfusion unless hemoglobin is 7, 8 in patient with coronary artery disease, hemodynamic instability such as tachycardia/hypotension, or there is evidence of acute blood loss.  Anemia is due to postoperative blood loss

## 2020-09-03 NOTE — PROGRESS NOTE ADULT - ATTENDING COMMENTS
Patient seen by me. Complaining of status quo right knee pain, left leg wound drainage, biliary fistula purulent drainage.    Exam unchanged from yesterday    Markers a little bit improved from prior.    Status of infection clearance in the right knee remains unclear. Inflammatory markers are still elevated. The aspiration was not helpful. I would like to rule out femoral osteomyelitis given that the femur was the more compromised bone at the time of the prior explantation surgery. Furthermore, the proximal femur has an indwelling daniela that has been in place for some months. Imaging will not be helpful given the hardware and multiple surgeries. I considered an image guided bone biopsy from the diaphysis but think that this would create a stress riser with risk for a catastrophic fracture. I think the most reasonable next option is to take the lag screw from the cephalomedullary nail for cultures, as well as bone biopsies at the same time. Will perform this tomorrow. He understands and is agreeable.

## 2020-09-03 NOTE — PHYSICAL THERAPY INITIAL EVALUATION ADULT - DIAGNOSIS, PT EVAL
4E: Impaired Joint Mobility, Motor Function, Muscle Performance, and Range of Motion Associated with Localized Inflammation

## 2020-09-03 NOTE — CONSULT NOTE ADULT - PROBLEM SELECTOR RECOMMENDATION 10
The patient's medical condition is optimized for surgery.  There is no contraindication for surgery.  There is no clinical evidence neither of angina, decompensated CHF, arrhthymias, nor valvular disease.   There is no limitation of exercise capacity.  MET is 5 .  ASA class is 3.  Arrington cardiac risk factor is high  .  DVT prophylaxis is indicated.  Pain control.  Early mobilization.  Avoid fluid overload.

## 2020-09-03 NOTE — PHYSICAL THERAPY INITIAL EVALUATION ADULT - ACTIVE RANGE OF MOTION EXAMINATION, REHAB EVAL
bilateral upper extremity Active ROM was WFL (within functional limits)/R knee fixed into extension/Left LE Active ROM was WFL (within functional limits)

## 2020-09-03 NOTE — PROGRESS NOTE ADULT - SUBJECTIVE AND OBJECTIVE BOX
Pain Management Progress Note - [facility] [phone]      HPI: Patient seen and examined today. Patient with a history of knee pain, diabetic neuropathy, CHF, HTN, MRSA bacteremia, diverticulitis, COPD, hyperkalemia, chronic systolic CHF, osteoarthritis,  s/p R Revision TKA and antibiotic spacer by Dr. Castro on 7/22, now presenting with R knee pain and swelling x3 days. Patient reports RUQ abdominal pain, R hip pain, R knee pain, pain controlled with current pain medication regimen. Patient Axox3, denies n,v, no s/s of oversedation. Reviewed pain medication regimen with patient at bedside.       Pain is _x__ sharp ____dull ___burning _x__achy ___ Intensity: ____ mild ___mod ___severe     Location _x___surgical site ____cervical _____lumbar ____abd ____upper ext____lower ext    Worse with _x___activity _x___movement _____physical therapy___ Rest    Improved with _x___medication __x__rest ____physical therapy        HYDROmorphone  Injectable  spironolactone Oral Tab/Cap - Peds  acetaminophen   Tablet ..  aspirin enteric coated  celecoxib  oxyCODONE  ER Tablet  oxyCODONE    IR  tamsulosin Oral Tab/Cap - Peds  DULoxetine  atorvastatin  prasugrel  rifAMPin  metoprolol succinate ER  silver sulfADIAZINE 1% Cream  pantoprazole    Tablet  vancomycin  IVPB  spironolactone  tamsulosin  insulin lispro (HumaLOG) corrective regimen sliding scale  dextrose 5%.  dextrose 40% Gel  dextrose 50% Injectable  dextrose 50% Injectable  dextrose 50% Injectable  glucagon  Injectable  HYDROmorphone  Injectable  HYDROmorphone   Tablet  HYDROmorphone   Tablet  HYDROmorphone  Injectable  gabapentin  cyclobenzaprine  sodium chloride 0.9% lock flush  chlorhexidine 4% Liquid  diphenhydrAMINE  spironolactone  povidone iodine 5% Nasal Swab  chlorhexidine 2% Cloths  lactated ringers.  rosuvastatin      ROS: Const:  -___febrile   Eyes:___ENT:___CV: __-_chest pain  Resp: __-__sob  GI:_-__nausea __-_vomiting _x_abd pain ___npo ___clears x__full diet __bm  :___ Musk: _x__pain _-__spasm  Skin:___ Neuro:  _-__ucoxyowb_-__dwypvmkjc_-__ numbness _-__weakness __x_paresth  Psych:_-_anxiety  Endo:___ Heme:___Allergy:_________, _x__all others reviewed and negative      PAST MEDICAL & SURGICAL HISTORY:  Diabetic neuropathy  STEMI (ST elevation myocardial infarction)  Diverticulitis  MRSA bacteremia  History of celiac disease  CHF (congestive heart failure): EF ~ 25%  HTN (hypertension)  Diabetes: on insulin pump  Blood clot due to device, implant, or graft: was on blood thinners  HLD (hyperlipidemia)  Osteoarthritis  Atherosclerosis of coronary artery: CAD (coronary artery disease)  Status post percutaneous transluminal coronary angioplasty: in 2012  History of open reduction and internal fixation (ORIF) procedure  Surgery, elective: Right shoulder  Surgery, elective: right knee wound debridement  S/P TKR (total knee replacement), right: with infection Mrsa   per pt he was cleared from MRSA infection  S/P CABG x 1: 2018  Stented coronary artery: 10/18 heart attack  INFERIOR WALL MI  Other postprocedural status: Fixation hardware in foot LEFT      09-03 @ 10:2164 mL/min/1.73M2      Hemoglobin: 8.2 g/dL (09-03 @ 10:21)  Hemoglobin: 8.3 g/dL (09-02 @ 05:51)  Hemoglobin: 8.5 g/dL (09-01 @ 17:27)        T(C): 36.7 (09-03-20 @ 14:18), Max: 36.7 (09-03-20 @ 00:31)  HR: 82 (09-03-20 @ 14:18) (81 - 89)  BP: 97/61 (09-03-20 @ 14:18) (95/53 - 148/90)  RR: 17 (09-03-20 @ 14:18) (17 - 20)  SpO2: 100% (09-03-20 @ 14:18) (95% - 100%)  Wt(kg): --       PHYSICAL EXAM:  Gen Appearance: x___no acute distress _x__appropriate        Neuro: _x__SILT feet____ EOM Intact Psych: AAOX__3, x___mood/affect appropriate        Eyes: __x_conjunctiva WNL  __x__ Pupils equal and round        ENT: x___ears and nose atraumatic_x__ Hearing grossly intact        Neck: _x__trachea midline, no visible masses ___thyroid without palpable mass    Resp: _x__Nml WOB____No tactile fremitus ___clear to auscultation    Cardio: ___extremities free from edema __x__pedal pulses palpable    GI/Abdomen: __x_soft ___x__ Nontender___x___Nondistended_____HSM    Lymphatic: ___no palpable nodes in neck  ___no palpable nodes calves and feet    Skin/Wound: ___Incision, ___Dressing c/d/i,   ____surrounding tissues soft,  ___drain/chest tube present____    Muscular: EHL _4__/5  Gastrocnemius_4__/5    ___absent clubbing/cyanosis          ASSESSMENT: This is a 65y old Male with a history of knee pain, diabetic neuropathy, CHF, HTN, MRSA bacteremia, diverticulitis, COPD, hyperkalemia, chronic systolic CHF, osteoarthritis,  s/p R Revision TKA and antibiotic spacer by Dr. Castro on 7/22, now presenting with R knee pain and swelling x3 days, pain managed with current pain medication regimen.       Recommended Treatment PLAN:  1. Dilaudid 2-4mg PO Q4h prn moderate to severe pain  2. Oxycontin 10mg PO BID  3. Flexeril 5mg PO Q8h prn muscle spasms  4. Gabapentin 100mg PO TID  Plan discussed with Dr. Chavez Pain Management Progress Note - [facility] [phone]      HPI: Patient seen and examined today. Patient with a history of knee pain, diabetic neuropathy, CHF, HTN, MRSA bacteremia, diverticulitis, COPD, hyperkalemia, chronic systolic CHF, osteoarthritis,  s/p R Revision TKA and antibiotic spacer by Dr. Castro on 7/22, now presenting with R knee pain and swelling x3 days. Patient reports RUQ abdominal pain, R hip pain, R knee pain, pain controlled with current pain medication regimen. Patient Axox3, denies n,v, no s/s of oversedation. Reviewed pain medication regimen with patient at bedside.       Pain is _x__ sharp ____dull ___burning _x__achy ___ Intensity: ____ mild ___mod ___severe     Location _x___surgical site ____cervical _____lumbar ____abd ____upper ext____lower ext    Worse with _x___activity _x___movement _____physical therapy___ Rest    Improved with _x___medication __x__rest ____physical therapy        HYDROmorphone  Injectable  spironolactone Oral Tab/Cap - Peds  acetaminophen   Tablet ..  aspirin enteric coated  celecoxib  oxyCODONE  ER Tablet  oxyCODONE    IR  tamsulosin Oral Tab/Cap - Peds  DULoxetine  atorvastatin  prasugrel  rifAMPin  metoprolol succinate ER  silver sulfADIAZINE 1% Cream  pantoprazole    Tablet  vancomycin  IVPB  spironolactone  tamsulosin  insulin lispro (HumaLOG) corrective regimen sliding scale  dextrose 5%.  dextrose 40% Gel  dextrose 50% Injectable  dextrose 50% Injectable  dextrose 50% Injectable  glucagon  Injectable  HYDROmorphone  Injectable  HYDROmorphone   Tablet  HYDROmorphone   Tablet  HYDROmorphone  Injectable  gabapentin  cyclobenzaprine  sodium chloride 0.9% lock flush  chlorhexidine 4% Liquid  diphenhydrAMINE  spironolactone  povidone iodine 5% Nasal Swab  chlorhexidine 2% Cloths  lactated ringers.  rosuvastatin      ROS: Const:  -___febrile   Eyes:___ENT:___CV: __-_chest pain  Resp: __-__sob  GI:_-__nausea __-_vomiting _x_abd pain ___npo ___clears x__full diet __bm  :___ Musk: _x__pain _-__spasm  Skin:___ Neuro:  _-__eypjcajr_-__xmgwwlzmt_-__ numbness _-__weakness __x_paresth  Psych:_-_anxiety  Endo:___ Heme:___Allergy:_________, _x__all others reviewed and negative      PAST MEDICAL & SURGICAL HISTORY:  Diabetic neuropathy  STEMI (ST elevation myocardial infarction)  Diverticulitis  MRSA bacteremia  History of celiac disease  CHF (congestive heart failure): EF ~ 25%  HTN (hypertension)  Diabetes: on insulin pump  Blood clot due to device, implant, or graft: was on blood thinners  HLD (hyperlipidemia)  Osteoarthritis  Atherosclerosis of coronary artery: CAD (coronary artery disease)  Status post percutaneous transluminal coronary angioplasty: in 2012  History of open reduction and internal fixation (ORIF) procedure  Surgery, elective: Right shoulder  Surgery, elective: right knee wound debridement  S/P TKR (total knee replacement), right: with infection Mrsa   per pt he was cleared from MRSA infection  S/P CABG x 1: 2018  Stented coronary artery: 10/18 heart attack  INFERIOR WALL MI  Other postprocedural status: Fixation hardware in foot LEFT      09-03 @ 10:2164 mL/min/1.73M2      Hemoglobin: 8.2 g/dL (09-03 @ 10:21)  Hemoglobin: 8.3 g/dL (09-02 @ 05:51)  Hemoglobin: 8.5 g/dL (09-01 @ 17:27)        T(C): 36.7 (09-03-20 @ 14:18), Max: 36.7 (09-03-20 @ 00:31)  HR: 82 (09-03-20 @ 14:18) (81 - 89)  BP: 97/61 (09-03-20 @ 14:18) (95/53 - 148/90)  RR: 17 (09-03-20 @ 14:18) (17 - 20)  SpO2: 100% (09-03-20 @ 14:18) (95% - 100%)  Wt(kg): --       PHYSICAL EXAM:  Gen Appearance: x___no acute distress _x__appropriate        Neuro: _x__SILT feet____ EOM Intact Psych: AAOX__3, x___mood/affect appropriate        Eyes: __x_conjunctiva WNL  __x__ Pupils equal and round        ENT: x___ears and nose atraumatic_x__ Hearing grossly intact        Neck: _x__trachea midline, no visible masses ___thyroid without palpable mass    Resp: _x__Nml WOB____No tactile fremitus ___clear to auscultation    Cardio: ___extremities free from edema __x__pedal pulses palpable    GI/Abdomen: __x_soft ___x__ Nontender___x___Nondistended_____HSM    Lymphatic: ___no palpable nodes in neck  ___no palpable nodes calves and feet    Skin/Wound: ___Incision, ___Dressing c/d/i,   ____surrounding tissues soft,  ___drain/chest tube present____    Muscular: EHL _4__/5  Gastrocnemius_4__/5    ___absent clubbing/cyanosis          ASSESSMENT: This is a 65y old Male with a history of knee pain, diabetic neuropathy, CHF, HTN, MRSA bacteremia, diverticulitis, COPD, hyperkalemia, chronic systolic CHF, osteoarthritis,  s/p R Revision TKA and antibiotic spacer by Dr. Castro on 7/22, now presenting with R knee pain and swelling x3 days, pain managed with current pain medication regimen.       Recommended Treatment PLAN:  1. Dilaudid 2-4mg PO Q4h prn moderate to severe pain  2. Flexeril 5mg PO Q8h prn muscle spasms  3. Gabapentin 100mg PO TID  Plan discussed with Dr. Chavez

## 2020-09-03 NOTE — PROGRESS NOTE ADULT - SUBJECTIVE AND OBJECTIVE BOX
OVERNIGHT EVENTS:    SUBJECTIVE / INTERVAL HPI: Patient seen and examined at bedside.       PHYSICAL EXAM:    General:  Chest:  Heart:  Abd:  Neuro:  Extremities:  Skin:      VITAL SIGNS:  T(C): 36.7 (09-03-20 @ 16:53), Max: 36.7 (09-03-20 @ 00:31)  HR: 94 (09-03-20 @ 18:41) (81 - 96)  BP: 117/57 (09-03-20 @ 18:41) (95/53 - 148/90)  RR: 17 (09-03-20 @ 16:53) (17 - 18)  SpO2: 95% (09-03-20 @ 16:53) (95% - 100%)    MEDICATIONS:  MEDICATIONS  (STANDING):  chlorhexidine 4% Liquid 1 Application(s) Topical <User Schedule>  dextrose 5%. 1000 milliLiter(s) (50 mL/Hr) IV Continuous <Continuous>  dextrose 50% Injectable 12.5 Gram(s) IV Push once  dextrose 50% Injectable 25 Gram(s) IV Push once  dextrose 50% Injectable 25 Gram(s) IV Push once  gabapentin 100 milliGRAM(s) Oral every 8 hours  insulin lispro (HumaLOG) corrective regimen sliding scale   SubCutaneous Before meals and at bedtime  metoprolol succinate ER 25 milliGRAM(s) Oral daily  pantoprazole    Tablet 40 milliGRAM(s) Oral before breakfast  rifAMPin 300 milliGRAM(s) Oral every 12 hours  rosuvastatin 10 milliGRAM(s) Oral at bedtime  silver sulfADIAZINE 1% Cream 1 Application(s) Topical two times a day  tamsulosin 0.4 milliGRAM(s) Oral at bedtime    MEDICATIONS  (PRN):  acetaminophen   Tablet .. 650 milliGRAM(s) Oral every 6 hours PRN Temp greater or equal to 38C (100.4F), Mild Pain (1 - 3)  cyclobenzaprine 5 milliGRAM(s) Oral three times a day PRN Muscle Spasm  dextrose 40% Gel 15 Gram(s) Oral once PRN Blood Glucose LESS THAN 70 milliGRAM(s)/deciliter  diphenhydrAMINE 50 milliGRAM(s) Oral every 4 hours PRN Rash and/or Itching  glucagon  Injectable 1 milliGRAM(s) IntraMuscular once PRN Glucose LESS THAN 70 milligrams/deciliter  HYDROmorphone  Injectable 0.5 milliGRAM(s) IV Push every 4 hours PRN breakthrough pain  oxyCODONE    IR 5 milliGRAM(s) Oral every 4 hours PRN Moderate Pain (4 - 6)  oxyCODONE    IR 10 milliGRAM(s) Oral every 4 hours PRN Severe Pain (7 - 10)  sodium chloride 0.9% lock flush 10 milliLiter(s) IV Push every 1 hour PRN Pre/post blood products, medications, blood draw, and to maintain line patency      LABS:                        8.2    8.22  )-----------( 405      ( 03 Sep 2020 10:21 )             29.1     09-03    133<L>  |  98  |  35<H>  ----------------------------<  188<H>  4.0   |  25  |  1.20    Ca    8.7      03 Sep 2020 10:21    TPro  7.0  /  Alb  3.2<L>  /  TBili  0.5  /  DBili  x   /  AST  16  /  ALT  18  /  AlkPhos  167<H>  09-03              Microbiology:   Culture - Body Fluid with Gram Stain (collected 09-03-20 @ 16:41)  Source: Abdominal Fl abdominal drain RUQ  Preliminary Report (09-03-20 @ 16:42):    Please use a sterile cup for fluid specimens            RADIOLOGY & ADDITIONAL TESTS: Reviewed. OVERNIGHT EVENTS:  No events    SUBJECTIVE / INTERVAL HPI:  - Patient seen and examined at bedside, reports unchanged pain of L knee. Also endorses drainage from old cholecystestomy tube site  - seen by surgery today, recommended MRCP and MRI RUQ        PHYSICAL EXAM:    GE: NAD   Lungs:  CTAB, no signs of increased WOB  HeartL  RRR, S1, S2, no rmg  Abd:  BS+ in 4 Q. Soft, nontender, no masses, guarding or rebound.  ~1.5 cm induration surrounding RUQ scar, thin purulent liquid draining when patient "squeezes the skin".   Extremities:  R knee incision with 5 X 2 cm area of eschar, no surrounding erythema or warmth.   LLE~11 X 8 cm ulcer with fibrinous base, no purulence, no surrounding erythema.  Skin:  No rashes.      VITAL SIGNS:  T(C): 36.7 (09-03-20 @ 16:53), Max: 36.7 (09-03-20 @ 00:31)  HR: 94 (09-03-20 @ 18:41) (81 - 96)  BP: 117/57 (09-03-20 @ 18:41) (95/53 - 148/90)  RR: 17 (09-03-20 @ 16:53) (17 - 18)  SpO2: 95% (09-03-20 @ 16:53) (95% - 100%)    MEDICATIONS:  MEDICATIONS  (STANDING):  chlorhexidine 4% Liquid 1 Application(s) Topical <User Schedule>  dextrose 5%. 1000 milliLiter(s) (50 mL/Hr) IV Continuous <Continuous>  dextrose 50% Injectable 12.5 Gram(s) IV Push once  dextrose 50% Injectable 25 Gram(s) IV Push once  dextrose 50% Injectable 25 Gram(s) IV Push once  gabapentin 100 milliGRAM(s) Oral every 8 hours  insulin lispro (HumaLOG) corrective regimen sliding scale   SubCutaneous Before meals and at bedtime  metoprolol succinate ER 25 milliGRAM(s) Oral daily  pantoprazole    Tablet 40 milliGRAM(s) Oral before breakfast  rifAMPin 300 milliGRAM(s) Oral every 12 hours  rosuvastatin 10 milliGRAM(s) Oral at bedtime  silver sulfADIAZINE 1% Cream 1 Application(s) Topical two times a day  tamsulosin 0.4 milliGRAM(s) Oral at bedtime    MEDICATIONS  (PRN):  acetaminophen   Tablet .. 650 milliGRAM(s) Oral every 6 hours PRN Temp greater or equal to 38C (100.4F), Mild Pain (1 - 3)  cyclobenzaprine 5 milliGRAM(s) Oral three times a day PRN Muscle Spasm  dextrose 40% Gel 15 Gram(s) Oral once PRN Blood Glucose LESS THAN 70 milliGRAM(s)/deciliter  diphenhydrAMINE 50 milliGRAM(s) Oral every 4 hours PRN Rash and/or Itching  glucagon  Injectable 1 milliGRAM(s) IntraMuscular once PRN Glucose LESS THAN 70 milligrams/deciliter  HYDROmorphone  Injectable 0.5 milliGRAM(s) IV Push every 4 hours PRN breakthrough pain  oxyCODONE    IR 5 milliGRAM(s) Oral every 4 hours PRN Moderate Pain (4 - 6)  oxyCODONE    IR 10 milliGRAM(s) Oral every 4 hours PRN Severe Pain (7 - 10)  sodium chloride 0.9% lock flush 10 milliLiter(s) IV Push every 1 hour PRN Pre/post blood products, medications, blood draw, and to maintain line patency      LABS:                        8.2    8.22  )-----------( 405      ( 03 Sep 2020 10:21 )             29.1     09-03    133<L>  |  98  |  35<H>  ----------------------------<  188<H>  4.0   |  25  |  1.20    Ca    8.7      03 Sep 2020 10:21    TPro  7.0  /  Alb  3.2<L>  /  TBili  0.5  /  DBili  x   /  AST  16  /  ALT  18  /  AlkPhos  167<H>  09-03          Microbiology:   Blood cultures:  9/1 X 2 - NGTD  COVID-19 PCR 9/1 ND          RADIOLOGY & ADDITIONAL TESTS: Reviewed.

## 2020-09-03 NOTE — PROGRESS NOTE ADULT - ASSESSMENT
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  He has completed 6 weeks of vancomycin & rifampin.  Inflammatory markers remained very elevated as outpatient, inpatient values are elevated but less so and improved from discharge last month.  His knee was aspirated by Dr. Castro last night - small amount of blood was obtained, problems with cell count and culture.  He has other potential sources for infection/inflammation and is undergoing evaluation by Surgery regarding reported RUQ persistent drainage.  RUQ US was done but is unrevealing and suboptimal study.  No drainage seen by me on exam at this time and liver enzymes are WNL with mildly elevated alk phos.  LLE ulcer does not appear grossly infected at this time.  - ob 66 yo M with HTN, hyperlipidemia, type II DM with peripheral neuropathy, CAD s/p MIDCAB (2018)/VERONICA, HFrEF, AICD (2/20), pulmonary HTN, chronic cholecystitis with recurrent R knee PPJI - MRSA, likely persistent from 11/2019.  He has completed 6 weeks of vancomycin & rifampin.  Inflammatory markers remained very elevated as outpatient, inpatient values downtrending and improved from discharge last month.  His knee was aspirated by Dr. Castro on 9/2/2020- small amount of blood was obtained, problems with cell count and culture.  He has other potential sources for infection/inflammation and is undergoing evaluation by Surgery regarding reported RUQ persistent drainage. CT abdomen reporting ??s/p cholecystectomy and fluid in gall bladder logia, but patient DID NOT undergo cholecystectomy. Awaiting MRCP/MRI RUQ.  LLE ulcer does not appear grossly infected.    Recs:  - Please obtain Vanc trough tonight at 10pm. If <20 then c/w 1.25 Q24H and recheck trough before 3rd dose. 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  He has completed 6 weeks of vancomycin & rifampin.  Inflammatory markers remained very elevated as outpatient, inpatient values downtrending and improved from discharge last month.  His knee was aspirated by Dr. Castro on 9/2/2020- small amount of blood was obtained, problems with cell count and culture.  He has other potential sources for infection/inflammation and is undergoing evaluation by Surgery regarding reported RUQ persistent drainage. CT abdomen reporting ??s/p cholecystectomy and fluid in gall bladder logia, but patient DID NOT undergo cholecystectomy. Awaiting MRCP/MRI RUQ.  LLE ulcer does not appear grossly infected.    Recs:  - Please obtain Vanc trough tonight at 10pm. If <20 then c/w 1.25 Q24H and recheck trough before 3rd dose. If > 20 - hold dose and repeat trough tomorrow in am  - obtain MRI RUQ/MRCP as per surgery recs  - Trend ESR/CRP  - f/u blood Cx from 9/1  - c/w rifampin 300mg PO Q12H  - please obtain cultures from fluid draining from RUQ (after cleaning the skin with chlorhexidine)  - Team 1 will follow

## 2020-09-03 NOTE — PHYSICAL THERAPY INITIAL EVALUATION ADULT - GAIT DEVIATIONS NOTED, PT EVAL
decreased step length/decreased stride length/decreased louann/increased time in double stance/decreased velocity of limb motion/decreased weight-shifting ability

## 2020-09-03 NOTE — PROGRESS NOTE ADULT - SUBJECTIVE AND OBJECTIVE BOX
Pt comfortable pain mostly controlled. Still complaining of R knee pain that isn't improving.  Denies CP, SOB, N/V, numbness/tingling     Vital Signs Last 24 Hrs  T(C): 36.7 (03 Sep 2020 06:02), Max: 37 (02 Sep 2020 09:00)  T(F): 98 (03 Sep 2020 06:02), Max: 98.6 (02 Sep 2020 09:00)  HR: 89 (03 Sep 2020 06:02) (72 - 89)  BP: 103/61 (03 Sep 2020 06:02) (91/56 - 148/90)  BP(mean): --  RR: 18 (03 Sep 2020 06:02) (16 - 20)  SpO2: 96% (03 Sep 2020 06:02) (95% - 100%)    General: Pt Alert and oriented, NAD  R Knee wrapped in kerlix DSG C/D/I  L Knee wet-dry DSG C/D/I  Pulses: 2+ distally  Sensation: SILT distally  Motor: EHL/FHL/TA/GS 5/5, unable to flex/extend knee 2/2 spacer         A/P: 65yMale s/p R Revision TKA and antibiotic spacer by Dr. Castro on 7/22, now presenting with R knee pain and swelling x3 days.  - Stable  - Pain Control  - DVT ppx: ASA  - ABx: continue with home abx vanco 1000 q12 and rifampin 300 q12, f/u AM Vanco trough  - f/u ID recs  - F/u gen surg recs for draining abdominal wound, may need cholecystectomy?  - PT, WBS: WBAT  - L Leg wound daily dressing changes with wet to dry  - Monitor blood sugars, was difficult to control during last visit, may need to re-consult endocrine/medicine  - f/u pain recs    Ortho Pager [phone]

## 2020-09-04 ENCOUNTER — APPOINTMENT (OUTPATIENT)
Dept: ORTHOPEDIC SURGERY | Facility: HOSPITAL | Age: 65
End: 2020-09-04

## 2020-09-04 LAB
ANION GAP SERPL CALC-SCNC: 10 MMOL/L — SIGNIFICANT CHANGE UP (ref 5–17)
APPEARANCE UR: CLEAR — SIGNIFICANT CHANGE UP
BILIRUB UR-MCNC: NEGATIVE — SIGNIFICANT CHANGE UP
BUN SERPL-MCNC: 31 MG/DL — HIGH (ref 7–23)
CALCIUM SERPL-MCNC: 8.7 MG/DL — SIGNIFICANT CHANGE UP (ref 8.4–10.5)
CHLORIDE SERPL-SCNC: 100 MMOL/L — SIGNIFICANT CHANGE UP (ref 96–108)
CO2 SERPL-SCNC: 24 MMOL/L — SIGNIFICANT CHANGE UP (ref 22–31)
COLOR SPEC: YELLOW — SIGNIFICANT CHANGE UP
CREAT SERPL-MCNC: 1.03 MG/DL — SIGNIFICANT CHANGE UP (ref 0.5–1.3)
CRP SERPL-MCNC: 2.64 MG/DL — HIGH (ref 0–0.4)
DIFF PNL FLD: NEGATIVE — SIGNIFICANT CHANGE UP
ERYTHROCYTE [SEDIMENTATION RATE] IN BLOOD: 25 MM/HR — HIGH
GLUCOSE BLDC GLUCOMTR-MCNC: 163 MG/DL — HIGH (ref 70–99)
GLUCOSE BLDC GLUCOMTR-MCNC: 191 MG/DL — HIGH (ref 70–99)
GLUCOSE BLDC GLUCOMTR-MCNC: 199 MG/DL — HIGH (ref 70–99)
GLUCOSE BLDC GLUCOMTR-MCNC: 229 MG/DL — HIGH (ref 70–99)
GLUCOSE SERPL-MCNC: 235 MG/DL — HIGH (ref 70–99)
GLUCOSE UR QL: >=1000
GRAM STN FLD: SIGNIFICANT CHANGE UP
HCT VFR BLD CALC: 29 % — LOW (ref 39–50)
HGB BLD-MCNC: 8.3 G/DL — LOW (ref 13–17)
KETONES UR-MCNC: NEGATIVE — SIGNIFICANT CHANGE UP
LEUKOCYTE ESTERASE UR-ACNC: NEGATIVE — SIGNIFICANT CHANGE UP
MAGNESIUM SERPL-MCNC: 2 MG/DL — SIGNIFICANT CHANGE UP (ref 1.6–2.6)
MCHC RBC-ENTMCNC: 20.3 PG — LOW (ref 27–34)
MCHC RBC-ENTMCNC: 28.6 GM/DL — LOW (ref 32–36)
MCV RBC AUTO: 70.9 FL — LOW (ref 80–100)
NITRITE UR-MCNC: NEGATIVE — SIGNIFICANT CHANGE UP
NRBC # BLD: 0 /100 WBCS — SIGNIFICANT CHANGE UP (ref 0–0)
PH UR: 5.5 — SIGNIFICANT CHANGE UP (ref 5–8)
PLATELET # BLD AUTO: 390 K/UL — SIGNIFICANT CHANGE UP (ref 150–400)
POTASSIUM SERPL-MCNC: 4 MMOL/L — SIGNIFICANT CHANGE UP (ref 3.5–5.3)
POTASSIUM SERPL-SCNC: 4 MMOL/L — SIGNIFICANT CHANGE UP (ref 3.5–5.3)
PROT UR-MCNC: ABNORMAL MG/DL
RBC # BLD: 4.09 M/UL — LOW (ref 4.2–5.8)
RBC # FLD: 18.8 % — HIGH (ref 10.3–14.5)
SODIUM SERPL-SCNC: 134 MMOL/L — LOW (ref 135–145)
SP GR SPEC: 1.02 — SIGNIFICANT CHANGE UP (ref 1–1.03)
SPECIMEN SOURCE: SIGNIFICANT CHANGE UP
UROBILINOGEN FLD QL: 0.2 E.U./DL — SIGNIFICANT CHANGE UP
WBC # BLD: 7.99 K/UL — SIGNIFICANT CHANGE UP (ref 3.8–10.5)
WBC # FLD AUTO: 7.99 K/UL — SIGNIFICANT CHANGE UP (ref 3.8–10.5)

## 2020-09-04 PROCEDURE — 99232 SBSQ HOSP IP/OBS MODERATE 35: CPT | Mod: GC

## 2020-09-04 PROCEDURE — 99232 SBSQ HOSP IP/OBS MODERATE 35: CPT

## 2020-09-04 PROCEDURE — 20680 REMOVAL OF IMPLANT DEEP: CPT | Mod: 79,RT

## 2020-09-04 PROCEDURE — 72170 X-RAY EXAM OF PELVIS: CPT | Mod: 26

## 2020-09-04 RX ORDER — MORPHINE SULFATE 50 MG/1
2 CAPSULE, EXTENDED RELEASE ORAL
Refills: 0 | Status: DISCONTINUED | OUTPATIENT
Start: 2020-09-04 | End: 2020-09-05

## 2020-09-04 RX ORDER — PRASUGREL 5 MG/1
10 TABLET, FILM COATED ORAL DAILY
Refills: 0 | Status: DISCONTINUED | OUTPATIENT
Start: 2020-09-05 | End: 2020-09-17

## 2020-09-04 RX ORDER — DEXTROSE 50 % IN WATER 50 %
25 SYRINGE (ML) INTRAVENOUS ONCE
Refills: 0 | Status: DISCONTINUED | OUTPATIENT
Start: 2020-09-04 | End: 2020-09-18

## 2020-09-04 RX ORDER — POLYETHYLENE GLYCOL 3350 17 G/17G
17 POWDER, FOR SOLUTION ORAL DAILY
Refills: 0 | Status: DISCONTINUED | OUTPATIENT
Start: 2020-09-04 | End: 2020-09-18

## 2020-09-04 RX ORDER — GLUCAGON INJECTION, SOLUTION 0.5 MG/.1ML
1 INJECTION, SOLUTION SUBCUTANEOUS ONCE
Refills: 0 | Status: DISCONTINUED | OUTPATIENT
Start: 2020-09-04 | End: 2020-09-18

## 2020-09-04 RX ORDER — VANCOMYCIN HCL 1 G
VIAL (EA) INTRAVENOUS
Refills: 0 | Status: DISCONTINUED | OUTPATIENT
Start: 2020-09-04 | End: 2020-09-04

## 2020-09-04 RX ORDER — METOPROLOL TARTRATE 50 MG
25 TABLET ORAL DAILY
Refills: 0 | Status: DISCONTINUED | OUTPATIENT
Start: 2020-09-04 | End: 2020-09-18

## 2020-09-04 RX ORDER — SODIUM CHLORIDE 9 MG/ML
1000 INJECTION, SOLUTION INTRAVENOUS
Refills: 0 | Status: DISCONTINUED | OUTPATIENT
Start: 2020-09-04 | End: 2020-09-10

## 2020-09-04 RX ORDER — ROSUVASTATIN CALCIUM 5 MG/1
10 TABLET ORAL AT BEDTIME
Refills: 0 | Status: DISCONTINUED | OUTPATIENT
Start: 2020-09-04 | End: 2020-09-18

## 2020-09-04 RX ORDER — GABAPENTIN 400 MG/1
100 CAPSULE ORAL EVERY 8 HOURS
Refills: 0 | Status: DISCONTINUED | OUTPATIENT
Start: 2020-09-04 | End: 2020-09-09

## 2020-09-04 RX ORDER — VANCOMYCIN HCL 1 G
1250 VIAL (EA) INTRAVENOUS EVERY 24 HOURS
Refills: 0 | Status: CANCELLED | OUTPATIENT
Start: 2020-09-05 | End: 2020-09-04

## 2020-09-04 RX ORDER — TAMSULOSIN HYDROCHLORIDE 0.4 MG/1
0.4 CAPSULE ORAL AT BEDTIME
Refills: 0 | Status: DISCONTINUED | OUTPATIENT
Start: 2020-09-04 | End: 2020-09-18

## 2020-09-04 RX ORDER — ACETAMINOPHEN 500 MG
650 TABLET ORAL EVERY 6 HOURS
Refills: 0 | Status: DISCONTINUED | OUTPATIENT
Start: 2020-09-04 | End: 2020-09-10

## 2020-09-04 RX ORDER — ATORVASTATIN CALCIUM 80 MG/1
40 TABLET, FILM COATED ORAL AT BEDTIME
Refills: 0 | Status: DISCONTINUED | OUTPATIENT
Start: 2020-09-04 | End: 2020-09-04

## 2020-09-04 RX ORDER — SENNA PLUS 8.6 MG/1
2 TABLET ORAL AT BEDTIME
Refills: 0 | Status: DISCONTINUED | OUTPATIENT
Start: 2020-09-04 | End: 2020-09-18

## 2020-09-04 RX ORDER — ASPIRIN/CALCIUM CARB/MAGNESIUM 324 MG
81 TABLET ORAL
Refills: 0 | Status: DISCONTINUED | OUTPATIENT
Start: 2020-09-04 | End: 2020-09-17

## 2020-09-04 RX ORDER — DEXTROSE 50 % IN WATER 50 %
12.5 SYRINGE (ML) INTRAVENOUS ONCE
Refills: 0 | Status: DISCONTINUED | OUTPATIENT
Start: 2020-09-04 | End: 2020-09-18

## 2020-09-04 RX ORDER — LIDOCAINE HCL 20 MG/ML
5 VIAL (ML) INJECTION ONCE
Refills: 0 | Status: DISCONTINUED | OUTPATIENT
Start: 2020-09-04 | End: 2020-09-04

## 2020-09-04 RX ORDER — ONDANSETRON 8 MG/1
4 TABLET, FILM COATED ORAL EVERY 6 HOURS
Refills: 0 | Status: DISCONTINUED | OUTPATIENT
Start: 2020-09-04 | End: 2020-09-18

## 2020-09-04 RX ORDER — CYCLOBENZAPRINE HYDROCHLORIDE 10 MG/1
5 TABLET, FILM COATED ORAL THREE TIMES A DAY
Refills: 0 | Status: DISCONTINUED | OUTPATIENT
Start: 2020-09-04 | End: 2020-09-18

## 2020-09-04 RX ORDER — OXYCODONE HYDROCHLORIDE 5 MG/1
10 TABLET ORAL EVERY 4 HOURS
Refills: 0 | Status: DISCONTINUED | OUTPATIENT
Start: 2020-09-04 | End: 2020-09-08

## 2020-09-04 RX ORDER — HYDROMORPHONE HYDROCHLORIDE 2 MG/ML
0.5 INJECTION INTRAMUSCULAR; INTRAVENOUS; SUBCUTANEOUS
Refills: 0 | Status: DISCONTINUED | OUTPATIENT
Start: 2020-09-04 | End: 2020-09-05

## 2020-09-04 RX ORDER — SPIRONOLACTONE 25 MG/1
25 TABLET, FILM COATED ORAL DAILY
Refills: 0 | Status: DISCONTINUED | OUTPATIENT
Start: 2020-09-04 | End: 2020-09-18

## 2020-09-04 RX ORDER — DEXTROSE 50 % IN WATER 50 %
15 SYRINGE (ML) INTRAVENOUS ONCE
Refills: 0 | Status: DISCONTINUED | OUTPATIENT
Start: 2020-09-04 | End: 2020-09-18

## 2020-09-04 RX ORDER — VANCOMYCIN HCL 1 G
1250 VIAL (EA) INTRAVENOUS ONCE
Refills: 0 | Status: COMPLETED | OUTPATIENT
Start: 2020-09-04 | End: 2020-09-04

## 2020-09-04 RX ORDER — LIDOCAINE HCL 20 MG/ML
10 VIAL (ML) INJECTION ONCE
Refills: 0 | Status: DISCONTINUED | OUTPATIENT
Start: 2020-09-04 | End: 2020-09-18

## 2020-09-04 RX ORDER — DIGOXIN 250 MCG
0.12 TABLET ORAL DAILY
Refills: 0 | Status: DISCONTINUED | OUTPATIENT
Start: 2020-09-04 | End: 2020-09-18

## 2020-09-04 RX ORDER — VANCOMYCIN HCL 1 G
1250 VIAL (EA) INTRAVENOUS EVERY 24 HOURS
Refills: 0 | Status: DISCONTINUED | OUTPATIENT
Start: 2020-09-05 | End: 2020-09-05

## 2020-09-04 RX ORDER — SODIUM CHLORIDE 9 MG/ML
1000 INJECTION, SOLUTION INTRAVENOUS
Refills: 0 | Status: DISCONTINUED | OUTPATIENT
Start: 2020-09-04 | End: 2020-09-18

## 2020-09-04 RX ORDER — OXYCODONE HYDROCHLORIDE 5 MG/1
5 TABLET ORAL EVERY 4 HOURS
Refills: 0 | Status: DISCONTINUED | OUTPATIENT
Start: 2020-09-04 | End: 2020-09-08

## 2020-09-04 RX ADMIN — ROSUVASTATIN CALCIUM 10 MILLIGRAM(S): 5 TABLET ORAL at 22:42

## 2020-09-04 RX ADMIN — Medication 166.67 MILLIGRAM(S): at 09:49

## 2020-09-04 RX ADMIN — Medication 25 MILLIGRAM(S): at 05:51

## 2020-09-04 RX ADMIN — PANTOPRAZOLE SODIUM 40 MILLIGRAM(S): 20 TABLET, DELAYED RELEASE ORAL at 07:23

## 2020-09-04 RX ADMIN — Medication 4: at 07:48

## 2020-09-04 RX ADMIN — Medication 1 APPLICATION(S): at 11:30

## 2020-09-04 RX ADMIN — SODIUM CHLORIDE 50 MILLILITER(S): 9 INJECTION, SOLUTION INTRAVENOUS at 00:22

## 2020-09-04 RX ADMIN — HYDROMORPHONE HYDROCHLORIDE 0.5 MILLIGRAM(S): 2 INJECTION INTRAMUSCULAR; INTRAVENOUS; SUBCUTANEOUS at 09:00

## 2020-09-04 RX ADMIN — GABAPENTIN 100 MILLIGRAM(S): 400 CAPSULE ORAL at 22:42

## 2020-09-04 RX ADMIN — HYDROMORPHONE HYDROCHLORIDE 0.5 MILLIGRAM(S): 2 INJECTION INTRAMUSCULAR; INTRAVENOUS; SUBCUTANEOUS at 08:39

## 2020-09-04 RX ADMIN — CHLORHEXIDINE GLUCONATE 1 APPLICATION(S): 213 SOLUTION TOPICAL at 05:51

## 2020-09-04 RX ADMIN — HYDROMORPHONE HYDROCHLORIDE 0.5 MILLIGRAM(S): 2 INJECTION INTRAMUSCULAR; INTRAVENOUS; SUBCUTANEOUS at 14:57

## 2020-09-04 RX ADMIN — HYDROMORPHONE HYDROCHLORIDE 0.5 MILLIGRAM(S): 2 INJECTION INTRAMUSCULAR; INTRAVENOUS; SUBCUTANEOUS at 15:34

## 2020-09-04 RX ADMIN — Medication 1 APPLICATION(S): at 05:51

## 2020-09-04 RX ADMIN — GABAPENTIN 100 MILLIGRAM(S): 400 CAPSULE ORAL at 05:51

## 2020-09-04 RX ADMIN — CHLORHEXIDINE GLUCONATE 1 APPLICATION(S): 213 SOLUTION TOPICAL at 11:30

## 2020-09-04 RX ADMIN — SPIRONOLACTONE 25 MILLIGRAM(S): 25 TABLET, FILM COATED ORAL at 05:54

## 2020-09-04 NOTE — PROGRESS NOTE ADULT - SUBJECTIVE AND OBJECTIVE BOX
Ortho Post Op Check    Procedure: R hip exchange of hardware   Surgeon: Dr. Castro     Patient seen and examined at bedside in the PACU   Pt comfortable without complaints, notes some pain at the right hip   Denies CP, SOB, N/V, numbness/tingling     Vital Signs Last 24 Hrs  T(C): 36.1 (09-04-20 @ 14:07), Max: 36.1 (09-04-20 @ 14:07)  T(F): 96.9 (09-04-20 @ 14:07), Max: 96.9 (09-04-20 @ 14:07)  HR: 86 (09-04-20 @ 14:52) (86 - 88)  BP: 117/66 (09-04-20 @ 14:52) (108/63 - 121/69)  BP(mean): 86 (09-04-20 @ 14:52) (77 - 91)  RR: 16 (09-04-20 @ 14:52) (16 - 17)  SpO2: 100% (09-04-20 @ 14:52) (95% - 100%)  AVSS    General: Pt Alert and oriented, NAD  Dressing C/D/I: gauze/tegaderm at the right hip   Pulses: 2+ DP pulses present   Sensation: intact to light touch   Motor: EHL/FHL/TA/GS firing bilaterally                           8.3    7.99  )-----------( 390      ( 04 Sep 2020 06:09 )             29.0   04 Sep 2020 06:09    134    |  100    |  31     ----------------------------<  235    4.0     |  24     |  1.03     Mg     2.0       04 Sep 2020 06:09    TPro  7.0    /  Alb  3.2    /  TBili  0.5    /  DBili  x      /  AST  16     /  ALT  18     /  AlkPhos  167    03 Sep 2020 10:21      Post-op X-Ray: right hip XR in anatomic alignment, hardware in place     A/P: 65yMale POD#0 s/p Right hip     - Stable in PACU   - Pain Control: IV and PO PRN   - DVT ppx: 81mg BID, effient   - Post op abx: vanco 1250mg q24 hrs  - PT, WBS: WBAT RLE     Ortho Pager [phone]

## 2020-09-04 NOTE — PROGRESS NOTE ADULT - ASSESSMENT
Assessment: 65M PMHx CAD s/p CABG, CHF, DM, HLD, HTN, acute cholecystitis s/p perc sudhir 2/28, recent R TKA 7/22 presents with R knee infection. General surgery consulted for purulent drainage near prior perc sudhir tube site.     s/p POD0 R hip hardware change by Ortho     Patient was planning on having GB however this was deferred given other ongoing medical conditions.     Patient currently afebrile, WBC 8, stable Alk chiara 167 otherwise normal LFTs  CT scan was done yesterday, did not visualized the gall bladder which is expected after per sudhir, as the gall bladder will be contracted and scarred  No abdominal pain, some drainage could be expressed today,  was cultured yesterday and showed a few pseudomonas cells, ID is on board, the patient is on Vanc and ID may add another abx    Discussed with Dr. Rubi and chief resident, will open the wound at the bedside once the patient comes from PACU and do some deroofing and wash out        Recommendations:  - lap sudhir can be done once the patient is medically optimized  - Will perform beside I&D in the drainage area in RUQ  - FU on Final culture  - Surgery will continue to FU

## 2020-09-04 NOTE — PROGRESS NOTE ADULT - ASSESSMENT
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  He has completed 6 weeks of vancomycin & rifampin.  Inflammatory markers remained very elevated as outpatient, inpatient values downtrending and improved from discharge last month.  His knee was aspirated by Dr. Castro on 9/2/2020- small amount of blood was obtained, problems with cell count and culture.  He has other potential sources for infection/inflammation and is undergoing evaluation by Surgery regarding reported RUQ persistent drainage. CT abdomen reporting ??s/p cholecystectomy and fluid in gall bladder logia, but patient DID NOT undergo cholecystectomy. Awaiting MRCP/MRI RUQ.  LLE ulcer does not appear grossly infected.    Recs:  - Continue 1.25 Q24H. Recheck trough before 3rd dose. If > 20 - hold dose and repeat trough tomorrow in am.  -Next vanc trough- Sunday AM   - c/w rifampin 300mg PO Q12H  - obtain MRI RUQ/MRCP as per surgery recs  - Trend ESR/CRP  -F/U micro studies 64 yo M with HTN, hyperlipidemia, type II DM with peripheral neuropathy, CAD s/p MIDCAB (2018)/VERONICA, HFrEF, AICD (2/20), pulmonary HTN, chronic cholecystitis with recurrent R knee PPJI - MRSA, likely persistent from 11/2019.  He has completed 6 weeks of vancomycin & rifampin.  Inflammatory markers remained very elevated as outpatient, inpatient values downtrending and improved from discharge last month.  His knee was aspirated by Dr. Castro on 9/2/2020- small amount of blood was obtained, problems with cell count and culture.  He has other potential sources for infection/inflammation and is undergoing evaluation by Surgery regarding reported RUQ persistent drainage. CT abdomen reporting ??s/p cholecystectomy and fluid in gall bladder logia, but patient DID NOT undergo cholecystectomy. Awaiting MRCP/MRI RUQ.  LLE ulcer does not appear grossly infected.    Recs:  - Continue IV vancomycin 1.25 Q24H. Recheck trough before 3rd dose. If > 20 - hold dose and repeat trough tomorrow in am.  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  He has completed 6 weeks of vancomycin & rifampin.  Inflammatory markers remained very elevated as outpatient, inpatient values downtrending and improved from discharge last month.  His knee was aspirated by Dr. Castro on 9/2/2020- small amount of blood was obtained, problems with cell count and culture.  He has other potential sources for infection/inflammation and is undergoing evaluation by Surgery regarding reported RUQ persistent drainage. CT abdomen reporting ??s/p cholecystectomy and fluid in gall bladder logia, but patient DID NOT undergo cholecystectomy. Awaiting MRCP/MRI RUQ.  LLE ulcer does not appear grossly infected.    Recs:  - Continue IV vancomycin 1.25 Q24H. Recheck trough before 3rd dose. If > 20 - hold dose and repeat trough tomorrow in am.  -Next vanc trough- Sunday AM   - c/w rifampin 300mg PO Q12H  - obtain MRI RUQ/MRCP as per surgery recs - can only be done if AICD is compatible - would clarify with Cardiology  - Trend ESR/CRP  -F/U micro studies

## 2020-09-04 NOTE — CHART NOTE - NSCHARTNOTEFT_GEN_A_CORE
Admitting Diagnosis:   Patient is a 65y old  Male who presents with a chief complaint of R Knee Swelling (04 Sep 2020 12:32)    Consult: Yes [   ]  No [ x ]    Reason for Initial Nutrition Assessment: LOS Nutrition Assessment    PAST MEDICAL & SURGICAL HISTORY:  Diabetic neuropathy  STEMI (ST elevation myocardial infarction)  Diverticulitis  MRSA bacteremia  History of celiac disease  CHF (congestive heart failure): EF ~ 25%  HTN (hypertension)  Diabetes: on insulin pump  Blood clot due to device, implant, or graft: was on blood thinners  HLD (hyperlipidemia)  Osteoarthritis  Atherosclerosis of coronary artery: CAD (coronary artery disease)  Status post percutaneous transluminal coronary angioplasty: in 2012  History of open reduction and internal fixation (ORIF) procedure  Surgery, elective: Right shoulder  Surgery, elective: right knee wound debridement  S/P TKR (total knee replacement), right: with infection Mrsa   per pt he was cleared from MRSA infection  S/P CABG x 1: 2018  Stented coronary artery: 10/18 heart attack  INFERIOR WALL MI  Other postprocedural status: Fixation hardware in foot LEFT    Current Nutrition Order: NPO for OR    PO Intake: Good (%) [   ]  Fair (50-75%) [   ] Poor (<25%) [   ]- N/A NPO    GI Issues:   WDL, last BM 9/3  No n/v/d/c noted  No abd discomfort noted    Pain:  9/10 right knee pain radiating to ankle- pain regime per team    Skin Integrity:  Surgical incision, arelis score 18  Wounds- Right knee, left shin, RUQ abd  No edema present  No pressure ulcers noted    Labs:   09-04    134<L>  |  100  |  31<H>  ----------------------------<  235<H>  4.0   |  24  |  1.03    Ca    8.7      04 Sep 2020 06:09  Mg     2.0     09-04    TPro  7.0  /  Alb  3.2<L>  /  TBili  0.5  /  DBili  x   /  AST  16  /  ALT  18  /  AlkPhos  167<H>  09-03    CAPILLARY BLOOD GLUCOSE    POCT Blood Glucose.: 191 mg/dL (04 Sep 2020 11:02)  POCT Blood Glucose.: 229 mg/dL (04 Sep 2020 07:32)  POCT Blood Glucose.: 189 mg/dL (03 Sep 2020 21:43)  POCT Blood Glucose.: 199 mg/dL (03 Sep 2020 17:09)    Medications:  MEDICATIONS  (STANDING):  N/A Pt is in OR at this time    MEDICATIONS  (PRN):  N/A Pt is in OR at this time    Admitted Anthropometrics:  Height: 74" Weight: 185lbs, IBW 190lbs+/-10%, %IBW 97%, BMI 23.7 kg/m2    Weight:  July 2020 183lbs (previous admission)  9/1 185lbs    Weight Change: Weight has remained consistent, cont to trend weights biweekly.     Nutrition Focused Physical Exam: Completed [   ]  Unable to complete [ x  ]    Estimated energy needs:   ABW (84kg) used for calculations as pt between % of IBW. Needs adjusted for wound healing.   Kcal (25-30 kcal/kg): 5165-5799 kcal  Protein (1.2-1.4 g/kg pro): 100-118 g pro  Fluids (25-30 ml/kg): 7781-8074 ml    Subjective:   66 yo M with HTN, hyperlipidemia, type II DM (A1C 8.9% 7/20) with peripheral neuropathy, CAD s/p MIDCAB (2018)/VERONICA, HFrEF, AICD (2/20), pulmonary HTN, chronic cholecystitis with recurrent R knee PPJI - MRSA, likely persistent from 11/2019.  He has completed 6 weeks of vancomycin & rifampin.  Inflammatory markers remained very elevated as outpatient, inpatient values downtrending and improved from discharge last month.  His knee was aspirated by Dr. Castro on 9/2/2020. Pt with other potential sources for infection/inflammation and is undergoing evaluation by Surgery regarding reported RUQ persistent drainage and right knee infection. Pt started on vanco and rifampin. Currently pt is on OR for right hip VINOD per EMR.     Nutrition Diagnosis:    [  ] No active nutrition diagnosis at this time  [  ] Current medical condition precludes nutrition intervention    Goal:    Recommendations:    Education:     Risk Level: High [   ] Moderate [   ] Low [   ] Admitting Diagnosis:   Patient is a 65y old  Male who presents with a chief complaint of R Knee Swelling (04 Sep 2020 12:32)    Consult: Yes [   ]  No [ x ]    Reason for Initial Nutrition Assessment: LOS Nutrition Assessment    PAST MEDICAL & SURGICAL HISTORY:  Diabetic neuropathy  STEMI (ST elevation myocardial infarction)  Diverticulitis  MRSA bacteremia  History of celiac disease  CHF (congestive heart failure): EF ~ 25%  HTN (hypertension)  Diabetes: on insulin pump  Blood clot due to device, implant, or graft: was on blood thinners  HLD (hyperlipidemia)  Osteoarthritis  Atherosclerosis of coronary artery: CAD (coronary artery disease)  Status post percutaneous transluminal coronary angioplasty: in 2012  History of open reduction and internal fixation (ORIF) procedure  Surgery, elective: Right shoulder  Surgery, elective: right knee wound debridement  S/P TKR (total knee replacement), right: with infection Mrsa   per pt he was cleared from MRSA infection  S/P CABG x 1: 2018  Stented coronary artery: 10/18 heart attack  INFERIOR WALL MI  Other postprocedural status: Fixation hardware in foot LEFT    Current Nutrition Order: NPO for OR    PO Intake: Good (%) [   ]  Fair (50-75%) [   ] Poor (<25%) [   ]- N/A NPO    GI Issues:   WDL, last BM 9/3  No n/v/d/c noted  No abd discomfort noted    Pain:  9/10 right knee pain radiating to ankle- pain regime per team    Skin Integrity:  Surgical incision, arelis score 18  Wounds- Right knee, left shin, RUQ abd  No edema present  No pressure ulcers noted    Labs:   09-04    134<L>  |  100  |  31<H>  ----------------------------<  235<H>  4.0   |  24  |  1.03    Ca    8.7      04 Sep 2020 06:09  Mg     2.0     09-04    TPro  7.0  /  Alb  3.2<L>  /  TBili  0.5  /  DBili  x   /  AST  16  /  ALT  18  /  AlkPhos  167<H>  09-03    CAPILLARY BLOOD GLUCOSE    POCT Blood Glucose.: 191 mg/dL (04 Sep 2020 11:02)  POCT Blood Glucose.: 229 mg/dL (04 Sep 2020 07:32)  POCT Blood Glucose.: 189 mg/dL (03 Sep 2020 21:43)  POCT Blood Glucose.: 199 mg/dL (03 Sep 2020 17:09)    Medications:  MEDICATIONS  (STANDING):  N/A Pt is in OR at this time    MEDICATIONS  (PRN):  N/A Pt is in OR at this time    Admitted Anthropometrics:  Height: 74" Weight: 185lbs, IBW 190lbs+/-10%, %IBW 97%, BMI 23.7 kg/m2    Weight:  July 2020 183lbs (previous admission)  9/1 185lbs    Weight Change: Weight has remained consistent, cont to trend weights biweekly.     Nutrition Focused Physical Exam: Completed [   ]  Unable to complete [ x  ]    Estimated energy needs:   ABW (84kg) used for calculations as pt between % of IBW. Needs adjusted for wound healing.   Kcal (25-30 kcal/kg): 7232-7340 kcal  Protein (1.2-1.4 g/kg pro): 100-118 g pro  Fluids (25-30 ml/kg): 3154-5634 ml    Subjective:   64 yo M with HTN, hyperlipidemia, type II DM (A1C 8.9% 7/20) with peripheral neuropathy, CAD s/p MIDCAB (2018)/VERONICA, HFrEF, AICD (2/20), pulmonary HTN, chronic cholecystitis with recurrent R knee PPJI - MRSA, likely persistent from 11/2019.  He has completed 6 weeks of vancomycin & rifampin.  Inflammatory markers remained very elevated as outpatient, inpatient values downtrending and improved from discharge last month.  His knee was aspirated by Dr. Castro on 9/2/2020. Pt with other potential sources for infection/inflammation and is undergoing evaluation by Surgery regarding reported RUQ persistent drainage and right knee infection. Pt started on vanco and rifampin. Currently pt is on OR for right hip VINOD per EMR. Assessment was limited as pt is currently out of room. Hx provided by EMR and discussion with team. Pt is currently NPO. Per recent admissions, pt weight has remained consistent. Unable to assess PO intake PTA. NKFA. Hx of DM (last A1C 8.9% 7/20)- please obtain new A1C when feasible. POCT 229H likely 2/2 infection and hx of DM. Cont with tight BS control per team. Education deferred. Please see nutrition recs below. RD to follow up per protocol.     Nutrition Diagnosis: Increased pro needs RT increased nutrient demand 2/2 wound healing AEB multiple wound noted and planned right hip VINOD     [  ] No active nutrition diagnosis at this time  [  ] Current medical condition precludes nutrition intervention    Goal: Consistently meet >75% est needs    Recommendations:  1. NPO  >> Recommend advance to CST CHO diet with evening snack   >> Recommend addition of Glucerna shake once daily   2. Pain and bowel regime per team  3. Tight BS control  4. RD diet education prn    Education: Education deferred at this time    Risk Level: High [ x ] Moderate [   ] Low [   ]

## 2020-09-04 NOTE — PROGRESS NOTE ADULT - ATTENDING COMMENTS
I have reviewed the medical record, including laboratory and radiographic studies, interviewed and examined the patient and discussed the plan with Dr. Solomon, the ID Resident.  Agree with above.  Seen by me this morning, awaiting OR.  Culture of fluid from prior drain site is growing Pseudomonas.  Unclear if he will be able to get MRI/MRCP in view of AICD - would clarify with Cardiology if device is compatible.  He is afebrile, nontoxic appearing with nl WBC.  Will await susceptibilities before starting antibiotics.  Would also prefer to know if drainage is related to fluid in GB fossa before selecting agent.   Will continue to follow with you – ID Team 1.

## 2020-09-04 NOTE — PROGRESS NOTE ADULT - SUBJECTIVE AND OBJECTIVE BOX
INTERVAL HISTORY:  Walked w/o dyspnea or chest pain.	  Chart, PMH medications and allergies reviewed    MEDICATIONS:  MEDICATIONS  (STANDING):  chlorhexidine 2% Cloths 1 Application(s) Topical once  chlorhexidine 4% Liquid 1 Application(s) Topical <User Schedule>  dextrose 5%. 1000 milliLiter(s) (50 mL/Hr) IV Continuous <Continuous>  dextrose 50% Injectable 12.5 Gram(s) IV Push once  dextrose 50% Injectable 25 Gram(s) IV Push once  dextrose 50% Injectable 25 Gram(s) IV Push once  gabapentin 100 milliGRAM(s) Oral every 8 hours  insulin lispro (HumaLOG) corrective regimen sliding scale   SubCutaneous Before meals and at bedtime  lactated ringers. 1000 milliLiter(s) (50 mL/Hr) IV Continuous <Continuous>  metoprolol succinate ER 25 milliGRAM(s) Oral daily  pantoprazole    Tablet 40 milliGRAM(s) Oral before breakfast  povidone iodine 5% Nasal Swab 1 Application(s) Both Nostrils once  rifAMPin 300 milliGRAM(s) Oral every 12 hours  rosuvastatin 10 milliGRAM(s) Oral at bedtime  silver sulfADIAZINE 1% Cream 1 Application(s) Topical two times a day  spironolactone 25 milliGRAM(s) Oral daily  tamsulosin 0.4 milliGRAM(s) Oral at bedtime  vancomycin  IVPB      vancomycin  IVPB 1250 milliGRAM(s) IV Intermittent once      REVIEW OF SYSTEMS:  CONSTITUTIONAL: No fever, no weight loss, no fatigue  PULMONARY: No cough, no wheezing, chills no hemoptysis; No Shortness of Breath  CARDIOVASCULAR: No chest pain, no palpitations, no syncope, dizziness, no leg swelling  GASTROINTESTINAL: No abdominal pain. No nausea, no  vomiting, no hematemesis; No diarrhea, no constipation. No melena, no hematochezia.  GENITOURINARY: No dysuria, no frequency, no hematuria, no incontinence  SKIN: No itching, no burning, no rashes, no lesions     PHYSICAL EXAM:  T(C): 37.1 (20 @ 08:22), Max: 37.1 (20 @ 07:44)  HR: 98 (20 @ 08:22) (82 - 98)  BP: 127/60 (20 @ 08:22) (95/53 - 127/60)  RR: 16 (20 @ 08:22) (16 - 18)  SpO2: 99% (20 @ 08:22) (95% - 100%)  Wt(kg): --  I&O's Summary    03 Sep 2020 07:01  -  04 Sep 2020 07:00  --------------------------------------------------------  IN: 0 mL / OUT: 1300 mL / NET: -1300 mL        Weight (kg): 84 ( @ 06:27)  Appearance:  No deformities, normal appearance	  HEENT:   Normal oral mucosa, PERRL, EOMI	  Neck: No jvd , no masses  Lymphatic: No  lymphadenopathy  Pulmonary: Lungs clear to auscultation and percussion  Cardiovascular: Normal S1 S2, No JVD, No murmur, L tr edema	  Abdomen:  Soft, Non-tender, + BS  Skin: No rashes, No ecchymoses	  Extremities:  No clubbing or cyanosis  MSK: Normal range of motion, no joint swelling    LABS:		  CARDIAC MARKERS:                         8.3    7.99  )-----------( 390      ( 04 Sep 2020 06:09 )             29.0   -    134<L>  |  100  |  31<H>  ----------------------------<  235<H>  4.0   |  24  |  1.03    Ca    8.7      04 Sep 2020 06:09  Mg     2.0         TPro  7.0  /  Alb  3.2<L>  /  TBili  0.5  /  DBili  x   /  AST  16  /  ALT  18  /  AlkPhos  167<H>      proBNP:  Lipid Profile:  HgA1c:  TSH:      Culture - Fungal, Body Fluid (collected 20 @ 01:44)  Source: .Body Fluid abdominal drain RUQ  Preliminary Report (20 @ 08:14):    Testing in progress    Culture - Body Fluid with Gram Stain (collected 20 @ 16:41)  Source: Abdominal Fl abdominal drain RUQ  Gram Stain (20 @ 20:35):    No organisms seen    Moderate WBC's  Preliminary Report (20 @ 16:42):    Please use a sterile cup for fluid specimens    Culture - Blood (collected 20 @ 19:08)  Source: .Blood Blood  Preliminary Report (20 @ 20:01):    No growth at 2 days.    Culture - Blood (collected 20 @ 19:08)  Source: .Blood Blood  Preliminary Report (20 @ 20:01):    No growth at 2 days.    Urinalysis Basic - ( 04 Sep 2020 06:37 )    Color: Yellow / Appearance: Clear / S.020 / pH: x  Gluc: x / Ketone: NEGATIVE  / Bili: Negative / Urobili: 0.2 E.U./dL   Blood: x / Protein: Trace mg/dL / Nitrite: NEGATIVE   Leuk Esterase: NEGATIVE / RBC: < 5 /HPF / WBC < 5 /HPF   Sq Epi: x / Non Sq Epi: 0-5 /HPF / Bacteria: Present /HPF      TELEMETRY: NSR	EKG NSR Vent paced      QTC     :  	   QTC         RADIOLOGY:   DIAGNOSTIC TESTING: [ ] Echocardiogram: [ ]  Catheterization: [ ] Stress Test:      ASSESSMENT/PLAN: 	  Severely reduced ejection fraction-the patient takes diuretics occasionally for weight gain. He is sedentary however denies dyspnea. His pulmonary pressures are high on echo. Chest x-ray shows no CHF.  Rxs with metoprolol 25mg daily. Please start digoxin .125 daily.     Coronary artery disease-the patient denies chest discomfort. His EKG is uninterpretable secondary to the pacemaker. He was revascularized less than two years ago. There is no clinical evidence for ischemia. On rosuvastatin. The patient is history of stent thrombosis. He had a stent placed less than two years ago. Must remain on aspirin.     Defibrillator-no recent events.    The surgery is low risk.  The patient RCRI score is 3. He is high risk. He is optimized for surgery.      Discussed with house staff.

## 2020-09-04 NOTE — PROGRESS NOTE ADULT - SUBJECTIVE AND OBJECTIVE BOX
Pain Management Progress Note - [facility] [phone]      HPI: Patient seen and examined today. Patient with a history of knee pain, diabetic neuropathy, CHF, HTN, MRSA bacteremia, diverticulitis, COPD, hyperkalemia, chronic systolic CHF, osteoarthritis,  s/p R Revision TKA and antibiotic spacer by Dr. Castro on 7/22, now presenting with R knee pain and swelling x3 days. Patient reports RUQ abdominal pain, R hip pain, R knee pain, pain controlled with current pain medication regimen. Patient Axox3, denies n,v, no s/s of oversedation. Patient reports itchiness when taking Dilaudid PO and not with Dilaudid IVP. Patient denies any throat closing sensation or angioedema.  Reviewed pain medication regimen with patient at bedside. Discussed plan to switch patient to Oxycodone PO and Morphine IVP, patient verbalized understanding. Patient npo today scheduled for exchange of hardware today.       Pain is _x__ sharp ____dull ___burning _x__achy ___ Intensity: ____ mild _x__mod x__severe     Location _x___surgical site ____cervical _____lumbar ____abd ____upper ext____lower ext    Worse with _x___activity _x___movement _____physical therapy___ Rest    Improved with _x___medication __x__rest ____physical therapy      HYDROmorphone  Injectable  spironolactone Oral Tab/Cap - Peds  acetaminophen   Tablet ..  aspirin enteric coated  celecoxib  oxyCODONE  ER Tablet  oxyCODONE    IR  tamsulosin Oral Tab/Cap - Peds  DULoxetine  atorvastatin  prasugrel  rifAMPin  metoprolol succinate ER  silver sulfADIAZINE 1% Cream  pantoprazole    Tablet  vancomycin  IVPB  spironolactone  tamsulosin  insulin lispro (HumaLOG) corrective regimen sliding scale  dextrose 5%.  dextrose 40% Gel  dextrose 50% Injectable  dextrose 50% Injectable  dextrose 50% Injectable  glucagon  Injectable  HYDROmorphone  Injectable  HYDROmorphone   Tablet  HYDROmorphone   Tablet  HYDROmorphone  Injectable  gabapentin  cyclobenzaprine  sodium chloride 0.9% lock flush  chlorhexidine 4% Liquid  diphenhydrAMINE  spironolactone  povidone iodine 5% Nasal Swab  chlorhexidine 2% Cloths  lactated ringers.  rosuvastatin  oxyCODONE    IR  oxyCODONE    IR  vancomycin  IVPB  vancomycin  IVPB  vancomycin  IVPB      ROS: Const:  -___febrile   Eyes:___ENT:___CV: __-_chest pain  Resp: __-__sob  GI:_-__nausea __-_vomiting _x_ abd pain _x__npo ___clears __full diet __bm  :___ Musk: _x__pain _-__spasm  Skin:___ Neuro:  _-__oeesjbvc_-__taqogqzfe_-__ numbness _-__weakness __x_paresth  Psych:_-_anxiety  Endo:___ Heme:___Allergy:_________, _x__all others reviewed and negative      PAST MEDICAL & SURGICAL HISTORY:  Diabetic neuropathy  STEMI (ST elevation myocardial infarction)  Diverticulitis  MRSA bacteremia  History of celiac disease  CHF (congestive heart failure): EF ~ 25%  HTN (hypertension)  Diabetes: on insulin pump  Blood clot due to device, implant, or graft: was on blood thinners  HLD (hyperlipidemia)  Osteoarthritis  Atherosclerosis of coronary artery: CAD (coronary artery disease)  Status post percutaneous transluminal coronary angioplasty: in 2012  History of open reduction and internal fixation (ORIF) procedure  Surgery, elective: Right shoulder  Surgery, elective: right knee wound debridement  S/P TKR (total knee replacement), right: with infection Mrsa   per pt he was cleared from MRSA infection  S/P CABG x 1: 2018  Stented coronary artery: 10/18 heart attack  INFERIOR WALL MI  Other postprocedural status: Fixation hardware in foot LEFT      09-04 @ 06:0977 mL/min/1.73M2      Hemoglobin: 8.3 g/dL (09-04 @ 06:09)  Hemoglobin: 8.2 g/dL (09-03 @ 10:21)        T(C): 37.1 (09-04-20 @ 08:22), Max: 37.1 (09-04-20 @ 07:44)  HR: 98 (09-04-20 @ 08:22) (82 - 98)  BP: 127/60 (09-04-20 @ 08:22) (97/61 - 127/60)  RR: 16 (09-04-20 @ 08:22) (16 - 18)  SpO2: 99% (09-04-20 @ 08:22) (95% - 100%)  Wt(kg): --        PHYSICAL EXAM:  Gen Appearance: x___no acute distress _x__appropriate        Neuro: _x__SILT feet____ EOM Intact Psych: AAOX__3, x___mood/affect appropriate        Eyes: __x_conjunctiva WNL  __x__ Pupils equal and round        ENT: x___ears and nose atraumatic_x__ Hearing grossly intact        Neck: _x__trachea midline, no visible masses ___thyroid without palpable mass    Resp: _x__Nml WOB____No tactile fremitus ___clear to auscultation    Cardio: ___extremities free from edema __x__pedal pulses palpable    GI/Abdomen: __x_soft ___x__ Nontender___x___Nondistended_____HSM    Lymphatic: ___no palpable nodes in neck  ___no palpable nodes calves and feet    Skin/Wound: ___Incision, _x__Dressing c/d/i,   ____surrounding tissues soft,  ___drain/chest tube present____    Muscular: EHL _4__/5  Gastrocnemius_4__/5    ___absent clubbing/cyanosis          ASSESSMENT: This is a 65y old Male with a history of knee pain, diabetic neuropathy, CHF, HTN, MRSA bacteremia, diverticulitis, COPD, hyperkalemia, chronic systolic CHF, osteoarthritis,  s/p R Revision TKA and antibiotic spacer by Dr. Castro on 7/22, now presenting with R knee pain and swelling x3 days, pain managed with current pain medication regimen.       Recommended Treatment PLAN:  1. Oxycodone 5-10mg PO Q4h prn moderate to severe pain  2. Flexeril 5mg PO Q8h prn muscle spasms  3. Gabapentin 100mg PO TID  4. Morphine 2mg IVP Q4h prn breakthrough pain  Plan discussed with Dr. Chavez Pain Management Progress Note - [facility] [phone]      HPI: Patient seen and examined today. Patient with a history of knee pain, diabetic neuropathy, CHF, HTN, MRSA bacteremia, diverticulitis, COPD, hyperkalemia, chronic systolic CHF, osteoarthritis,  s/p R Revision TKA and antibiotic spacer by Dr. Castro on 7/22, now presenting with R knee pain and swelling x3 days. Patient reports RUQ abdominal pain, R hip pain, R knee pain, pain controlled with current pain medication regimen. Patient Axox3, denies n,v, no s/s of oversedation. Patient reports itchiness when taking Dilaudid PO and not with Dilaudid IVP. Patient denies any throat closing sensation or angioedema.  Reviewed pain medication regimen with patient at bedside. Discussed plan to switch patient to Oxycodone PO and Morphine IVP, patient verbalized understanding. Patient npo today scheduled for exchange of hardware today.       Pain is _x__ sharp ____dull ___burning _x__achy ___ Intensity: ____ mild _x__mod x__severe     Location _x___surgical site ____cervical _____lumbar ____abd ____upper ext____lower ext    Worse with _x___activity _x___movement _____physical therapy___ Rest    Improved with _x___medication __x__rest ____physical therapy      HYDROmorphone  Injectable  spironolactone Oral Tab/Cap - Peds  acetaminophen   Tablet ..  aspirin enteric coated  celecoxib  oxyCODONE  ER Tablet  oxyCODONE    IR  tamsulosin Oral Tab/Cap - Peds  DULoxetine  atorvastatin  prasugrel  rifAMPin  metoprolol succinate ER  silver sulfADIAZINE 1% Cream  pantoprazole    Tablet  vancomycin  IVPB  spironolactone  tamsulosin  insulin lispro (HumaLOG) corrective regimen sliding scale  dextrose 5%.  dextrose 40% Gel  dextrose 50% Injectable  dextrose 50% Injectable  dextrose 50% Injectable  glucagon  Injectable  HYDROmorphone  Injectable  HYDROmorphone   Tablet  HYDROmorphone   Tablet  HYDROmorphone  Injectable  gabapentin  cyclobenzaprine  sodium chloride 0.9% lock flush  chlorhexidine 4% Liquid  diphenhydrAMINE  spironolactone  povidone iodine 5% Nasal Swab  chlorhexidine 2% Cloths  lactated ringers.  rosuvastatin  oxyCODONE    IR  oxyCODONE    IR  vancomycin  IVPB  vancomycin  IVPB  vancomycin  IVPB      ROS: Const:  -___febrile   Eyes:___ENT:___CV: __-_chest pain  Resp: __-__sob  GI:_-__nausea __-_vomiting _x_ abd pain _x__npo ___clears __full diet __bm  :___ Musk: _x__pain _-__spasm  Skin:___ Neuro:  _-__idkqvsni_-__sahbjpmae_-__ numbness _-__weakness __x_paresth  Psych:_-_anxiety  Endo:___ Heme:___Allergy:_________, _x__all others reviewed and negative      PAST MEDICAL & SURGICAL HISTORY:  Diabetic neuropathy  STEMI (ST elevation myocardial infarction)  Diverticulitis  MRSA bacteremia  History of celiac disease  CHF (congestive heart failure): EF ~ 25%  HTN (hypertension)  Diabetes: on insulin pump  Blood clot due to device, implant, or graft: was on blood thinners  HLD (hyperlipidemia)  Osteoarthritis  Atherosclerosis of coronary artery: CAD (coronary artery disease)  Status post percutaneous transluminal coronary angioplasty: in 2012  History of open reduction and internal fixation (ORIF) procedure  Surgery, elective: Right shoulder  Surgery, elective: right knee wound debridement  S/P TKR (total knee replacement), right: with infection Mrsa   per pt he was cleared from MRSA infection  S/P CABG x 1: 2018  Stented coronary artery: 10/18 heart attack  INFERIOR WALL MI  Other postprocedural status: Fixation hardware in foot LEFT      09-04 @ 06:0977 mL/min/1.73M2      Hemoglobin: 8.3 g/dL (09-04 @ 06:09)  Hemoglobin: 8.2 g/dL (09-03 @ 10:21)        T(C): 37.1 (09-04-20 @ 08:22), Max: 37.1 (09-04-20 @ 07:44)  HR: 98 (09-04-20 @ 08:22) (82 - 98)  BP: 127/60 (09-04-20 @ 08:22) (97/61 - 127/60)  RR: 16 (09-04-20 @ 08:22) (16 - 18)  SpO2: 99% (09-04-20 @ 08:22) (95% - 100%)  Wt(kg): --        PHYSICAL EXAM:  Gen Appearance: x___no acute distress _x__appropriate        Neuro: _x__SILT feet____ EOM Intact Psych: AAOX__3, x___mood/affect appropriate        Eyes: __x_conjunctiva WNL  __x__ Pupils equal and round        ENT: x___ears and nose atraumatic_x__ Hearing grossly intact        Neck: _x__trachea midline, no visible masses ___thyroid without palpable mass    Resp: _x__Nml WOB____No tactile fremitus ___clear to auscultation    Cardio: ___extremities free from edema __x__pedal pulses palpable    GI/Abdomen: __x_soft ___x__ Nontender___x___Nondistended_____HSM    Lymphatic: ___no palpable nodes in neck  ___no palpable nodes calves and feet    Skin/Wound: ___Incision, _x__Dressing c/d/i,   ____surrounding tissues soft,  ___drain/chest tube present____    Muscular: EHL _4__/5  Gastrocnemius_4__/5    ___absent clubbing/cyanosis          ASSESSMENT: This is a 65y old Male with a history of knee pain, diabetic neuropathy, CHF, HTN, MRSA bacteremia, diverticulitis, COPD, hyperkalemia, chronic systolic CHF, osteoarthritis,  s/p R Revision TKA and antibiotic spacer by Dr. Castro on 7/22, now presenting with R knee pain and swelling x3 days, pain managed with current pain medication regimen.       Recommended Treatment PLAN:  1. Oxycodone 5-10mg PO Q4h prn moderate to severe pain  2. Flexeril 5mg PO Q8h prn muscle spasms  3. Gabapentin 100mg PO TID  4. Morphine 2mg IVP Q4h prn breakthrough pain  Plan discussed with Dr. Chavez

## 2020-09-04 NOTE — PROGRESS NOTE ADULT - SUBJECTIVE AND OBJECTIVE BOX
No changes overnight, the patient is going to surgery today by Ortho for change of R hip Hardware, still having discharge from old per sudhir site, that doesn't have a foul smelling.    Cultured yesterday.  Denies abdominal pain, fever, chills nausea or vomiting    MEDICATIONS  (STANDING):  acetaminophen   Tablet .. 650 milliGRAM(s) Oral every 6 hours  aspirin  chewable 81 milliGRAM(s) Oral two times a day  digoxin     Tablet 0.125 milliGRAM(s) Oral daily  gabapentin 100 milliGRAM(s) Oral every 8 hours  lactated ringers. 1000 milliLiter(s) (50 mL/Hr) IV Continuous <Continuous>  lidocaine 2% Injectable 5 milliLiter(s) Local Injection once  metoprolol succinate ER 25 milliGRAM(s) Oral daily  polyethylene glycol 3350 17 Gram(s) Oral daily  rosuvastatin 10 milliGRAM(s) Oral at bedtime  spironolactone 25 milliGRAM(s) Oral daily  tamsulosin 0.4 milliGRAM(s) Oral at bedtime    MEDICATIONS  (PRN):  aluminum hydroxide/magnesium hydroxide/simethicone Suspension 30 milliLiter(s) Oral four times a day PRN Indigestion  cyclobenzaprine 5 milliGRAM(s) Oral three times a day PRN Muscle Spasm  HYDROmorphone  Injectable 0.5 milliGRAM(s) IV Push every 15 minutes PRN Breakthrough Pain  morphine  - Injectable 2 milliGRAM(s) IV Push every 2 hours PRN breakthrough pain  ondansetron Injectable 4 milliGRAM(s) IV Push every 6 hours PRN Nausea and/or Vomiting  oxyCODONE    IR 5 milliGRAM(s) Oral every 4 hours PRN Moderate Pain (4 - 6)  oxyCODONE    IR 10 milliGRAM(s) Oral every 4 hours PRN Severe Pain (7 - 10)  senna 2 Tablet(s) Oral at bedtime PRN Constipation    I&O's Detail    03 Sep 2020 07:01  -  04 Sep 2020 07:00  --------------------------------------------------------  IN:  Total IN: 0 mL    OUT:    Voided: 1300 mL  Total OUT: 1300 mL    Total NET: -1300 mL      04 Sep 2020 07:01  -  04 Sep 2020 18:10  --------------------------------------------------------  IN:  Total IN: 0 mL    OUT:    Voided: 250 mL  Total OUT: 250 mL    Total NET: -250 mL      Vital Signs Last 24 Hrs  T(C): 36.8 (04 Sep 2020 17:47), Max: 37.1 (04 Sep 2020 07:44)  T(F): 98.2 (04 Sep 2020 17:47), Max: 98.7 (04 Sep 2020 07:44)  HR: 88 (04 Sep 2020 17:47) (86 - 98)  BP: 98/58 (04 Sep 2020 17:47) (98/58 - 127/60)  BP(mean): 86 (04 Sep 2020 14:52) (77 - 91)  RR: 16 (04 Sep 2020 17:47) (16 - 18)  SpO2: 100% (04 Sep 2020 17:47) (95% - 100%)    Gen: NAD, resting comfortably in bed  Pulm: Good inspiratory effort, nonlabored breathing  C/V: NSR  Abd: Soft, NT, ND, No guarding, No rebound tenderness. RUQ drain site with: minimal erythema, 3 cm induration, no fluctuance, was able to express some milky-yellowish material that doesn't have foul smelling  Extrem: WWP, no edema                          8.3    7.99  )-----------( 390      ( 04 Sep 2020 06:09 )             29.0   09-04    134<L>  |  100  |  31<H>  ----------------------------<  235<H>  4.0   |  24  |  1.03    Ca    8.7      04 Sep 2020 06:09  Mg     2.0     09-04    TPro  7.0  /  Alb  3.2<L>  /  TBili  0.5  /  DBili  x   /  AST  16  /  ALT  18  /  AlkPhos  167<H>  09-03    Culture - Body Fluid with Gram Stain (09.03.20 @ 16:41)    Gram Stain:   No organisms seen  Moderate WBC's    Specimen Source: Abdominal Fl abdominal drain RUQ    Culture Results:   Few Pseudomonas aeruginosa Susceptibility to follow.  Culture in progress

## 2020-09-04 NOTE — PROCEDURE NOTE - ADDITIONAL PROCEDURE DETAILS
Area prepped and drape in usual sterile fashion after explaining procedure to patient. 3 cc of 1% lidocaine administered. Incision of about 1 cm made over area of fluctuance. Milky drainage, non-odorous of about 2 cc was drained. Cavity tracked to right of incision about 1 cm. Packed with 1/4" iodoform packing. Dressed with gauze and tape.

## 2020-09-04 NOTE — PROGRESS NOTE ADULT - SUBJECTIVE AND OBJECTIVE BOX
Ortho Note    Pt comfortable without complaints, pain controlled  Denies CP, SOB, N/V, numbness/tingling     Vital Signs Last 24 Hrs  T(C): 36.9 (04 Sep 2020 00:24), Max: 37 (03 Sep 2020 21:35)  T(F): 98.4 (04 Sep 2020 00:24), Max: 98.6 (03 Sep 2020 21:35)  HR: 96 (04 Sep 2020 00:24) (82 - 96)  BP: 100/57 (04 Sep 2020 00:24) (95/53 - 117/57)  BP(mean): --  RR: 16 (04 Sep 2020 00:24) (16 - 18)  SpO2: 99% (04 Sep 2020 00:24) (95% - 100%)    RLE:  R knee mildly swollen; no erythema; no drainage; R knee flexion limited- which is baseline since July procedures  DSG- R knee healing surgical incision from 7/22 LEANDER  Pulses: feet cool to touch; at baseline; no edema  Sensation: SILT to baseline (diminished sensation- h/o severe neuropathy; follows with vascular Dr. Malloy outpatient)  Motor: 5/5 EHL/FHL/TA/GS    LLE- open healing vascular wound; wound bed light pink; no erythema, odor, or purulent drainage   Pulses: feet cool to touch; at baseline; no edema  Sensation: SILT to baseline (diminished sensation- h/o severe neuropathy; follows with vascular Dr. Malloy outpatient)  Motor: 5/5 EHL/FHL/TA/GS                        8.2    8.22  )-----------( 405      ( 03 Sep 2020 10:21 )             29.1   03 Sep 2020 10:21    133    |  98     |  35     ----------------------------<  188    4.0     |  25     |  1.20     Ca    8.7        03 Sep 2020 10:21    TPro  7.0    /  Alb  3.2    /  TBili  0.5    /  DBili  x      /  AST  16     /  ALT  18     /  AlkPhos  167    03 Sep 2020 10:21        A/P: 65yMale admitted for R knee pain, drainage s/p right knee arthroscopic I+D 7/17, right knee explant and abx spacer 7/22 with Dr. Oh;  and debridement of LLE wound with wound vac placement on 7/30 by Dr. Bowen. Was original d/c'd home on 8/10/20 with PICC and vancomycin IV q12h + rifampin  - H+H 8.3/29- patient has chronic anemia and is at baseline (d/c'd with 7.5/26.2 on 8/10); asymptomatic; will continue to monitor CBC  - vanco troph supratherapeutic at 26.6 yesterday- 10am dose held; 10pm trough 19.6, will continue at 1.25Q12  - will continue daily CMPs as patient has h/o elevated liver enzymes on rifampin in the past; atorvastatin d to rosuvastatin  - R knee aspiration  minimal-  crystals resulted; RN was notified cell count was clotted but no notification to ortho team. Sample contaminated prior to cultures being able to be run on sample. Will continue to monitor patient clinical status. Afebrile with no leukocytosis.   - R biliary drain site abcess, f/u cultures as per ID recs. RUQ u/s complete. f/u MRI as per Surgery team 4. ; may need cholecystectomy  in near future; will appreciate general surgery team 4 recs  - LLE vascular wound - daily WTD dressings as per Dr. Bowen  - h/o diabetic neuropathy/ poor circulation- Follows with Dr. Malloy (vascular) outpatient; will consult for clearance if necessary  - h/o cardiac stents/ EF 25%- follows with Dr. Caldera (cardiology); consulted for recs on this admission. Patient recently thinks his medications were adjusted but cannot remember which ones; continuing home regimen for now  - diabetes with hyperglycemia- appreciate internal medicine recs for glucose control inpatient  - Pain Control- was recently switched to dilaudid PO and gabapentin 100 tid outpatient by Dr. Castro; updated medications; appreciate pain management recs  - DVT ppx: anticoagulation held for OR, continue SCDs  - PT, WBS: WBAT  - continue bowel regimen  - NPO for OR today  - pre-op clearance from cardiology and internal medicine  - dispo: OR today for R hip VINOD    Ortho Pager [phone]

## 2020-09-04 NOTE — PROGRESS NOTE ADULT - SUBJECTIVE AND OBJECTIVE BOX
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  He has completed 6 weeks of vancomycin & rifampin.  Inflammatory markers remained very elevated as outpatient, inpatient values downtrending and improved from discharge last month.  His knee was aspirated by Dr. Castro on 9/2/2020- small amount of blood was obtained, problems with cell count and culture.  He has other potential sources for infection/inflammation and is undergoing evaluation by Surgery regarding reported RUQ persistent drainage. CT abdomen reporting ??s/p cholecystectomy and fluid in gall bladder logia, but patient DID NOT undergo cholecystectomy. Awaiting MRCP/MRI RUQ.  LLE ulcer does not appear grossly infected.      VITALS:   T(C): 37.1 (09-04-20 @ 08:22), Max: 37.1 (09-04-20 @ 07:44)  HR: 98 (09-04-20 @ 08:22) (82 - 98)  BP: 127/60 (09-04-20 @ 08:22) (95/53 - 127/60)  RR: 16 (09-04-20 @ 08:22) (16 - 18)  SpO2: 99% (09-04-20 @ 08:22) (95% - 100%)    GENERAL: NAD, lying in bed comfortably  HEAD:  Atraumatic, Normocephalic  EYES: EOMI, PERRLA, conjunctiva and sclera clear  ENT: Moist mucous membranes  NECK: Supple, No JVD  CHEST/LUNG: Clear to auscultation bilaterally; No rales, rhonchi, wheezing, or rubs. Unlabored respirations  HEART: Regular rate and rhythm; No murmurs, rubs, or gallops  ABDOMEN: BSx4; Soft, nontender, nondistended  EXTREMITIES:  2+ Peripheral Pulses, brisk capillary refill. No clubbing, cyanosis, or edema  NERVOUS SYSTEM:  A&Ox3, no focal deficits   SKIN: No rashes or lesions      LABS:                        8.3    7.99  )-----------( 390      ( 04 Sep 2020 06:09 )             29.0     09-04    134<L>  |  100  |  31<H>  ----------------------------<  235<H>  4.0   |  24  |  1.03    Ca    8.7      04 Sep 2020 06:09  Mg     2.0     09-04    TPro  7.0  /  Alb  3.2<L>  /  TBili  0.5  /  DBili  x   /  AST  16  /  ALT  18  /  AlkPhos  167<H>  09-03            Culture - Fungal, Body Fluid (collected 04 Sep 2020 01:44)  Source: .Body Fluid abdominal drain RUQ  Preliminary Report (04 Sep 2020 08:14):    Testing in progress    Culture - Body Fluid with Gram Stain (collected 03 Sep 2020 16:41)  Source: Abdominal Fl abdominal drain RUQ  Gram Stain (03 Sep 2020 20:35):    No organisms seen    Moderate WBC's  Preliminary Report (03 Sep 2020 16:42):    Please use a sterile cup for fluid specimens    Culture - Blood (collected 01 Sep 2020 19:08)  Source: .Blood Blood  Preliminary Report (03 Sep 2020 20:01):    No growth at 2 days.    Culture - Blood (collected 01 Sep 2020 19:08)  Source: .Blood Blood  Preliminary Report (03 Sep 2020 20:01):    No growth at 2 days. Infectious Disease Progress Note    OVERNIGHT EVENTS: Patient kept NPO for removal of hardware of right hip by orthopedics today. No acute overnight events.    SUBJECTIVE: Patient examined at bedside- in no acute distress. Denies abdominal pain, LE pain.     VITALS:   T(C): 37.1 (09-04-20 @ 08:22), Max: 37.1 (09-04-20 @ 07:44)  HR: 98 (09-04-20 @ 08:22) (82 - 98)  BP: 127/60 (09-04-20 @ 08:22) (95/53 - 127/60)  RR: 16 (09-04-20 @ 08:22) (16 - 18)  SpO2: 99% (09-04-20 @ 08:22) (95% - 100%)    GENERAL: NAD, lying in bed comfortably  HEAD:  Atraumatic, Normocephalic  EYES: EOMI, PERRLA, conjunctiva and sclera clear  ENT: Moist mucous membranes  NECK: Supple, No JVD  CHEST/LUNG: Clear to auscultation bilaterally; No rales, rhonchi, wheezing, or rubs. Unlabored respirations  HEART: Regular rate and rhythm; No murmurs, rubs, or gallops  ABDOMEN: BSx4; Soft, nontender, nondistended  EXTREMITIES:  2+ Peripheral Pulses, brisk capillary refill. No clubbing, cyanosis, or edema  NERVOUS SYSTEM:  A&Ox3, no focal deficits   SKIN: No rashes or lesions      LABS:                        8.3    7.99  )-----------( 390      ( 04 Sep 2020 06:09 )             29.0     09-04    134<L>  |  100  |  31<H>  ----------------------------<  235<H>  4.0   |  24  |  1.03    Ca    8.7      04 Sep 2020 06:09  Mg     2.0     09-04    TPro  7.0  /  Alb  3.2<L>  /  TBili  0.5  /  DBili  x   /  AST  16  /  ALT  18  /  AlkPhos  167<H>  09-03      Culture - Fungal, Body Fluid (collected 04 Sep 2020 01:44)  Source: .Body Fluid abdominal drain RUQ  Preliminary Report (04 Sep 2020 08:14):    Testing in progress    Culture - Body Fluid with Gram Stain (collected 03 Sep 2020 16:41)  Source: Abdominal Fl abdominal drain RUQ  Gram Stain (03 Sep 2020 20:35):    No organisms seen    Moderate WBC's  Preliminary Report (03 Sep 2020 16:42):    Please use a sterile cup for fluid specimens    Culture - Blood (collected 01 Sep 2020 19:08)  Source: .Blood Blood  Preliminary Report (03 Sep 2020 20:01):    No growth at 2 days.    Culture - Blood (collected 01 Sep 2020 19:08)  Source: .Blood Blood  Preliminary Report (03 Sep 2020 20:01):    No growth at 2 days.

## 2020-09-05 DIAGNOSIS — Z98.890 OTHER SPECIFIED POSTPROCEDURAL STATES: ICD-10-CM

## 2020-09-05 LAB
-  AZTREONAM: SIGNIFICANT CHANGE UP
-  CEFEPIME: SIGNIFICANT CHANGE UP
-  CIPROFLOXACIN: SIGNIFICANT CHANGE UP
-  GENTAMICIN: SIGNIFICANT CHANGE UP
-  PIPERACILLIN/TAZOBACTAM: SIGNIFICANT CHANGE UP
-  TOBRAMYCIN: SIGNIFICANT CHANGE UP
ALBUMIN SERPL ELPH-MCNC: 3 G/DL — LOW (ref 3.3–5)
ALP SERPL-CCNC: 153 U/L — HIGH (ref 40–120)
ALT FLD-CCNC: 24 U/L — SIGNIFICANT CHANGE UP (ref 10–45)
ANION GAP SERPL CALC-SCNC: 10 MMOL/L — SIGNIFICANT CHANGE UP (ref 5–17)
AST SERPL-CCNC: 26 U/L — SIGNIFICANT CHANGE UP (ref 10–40)
BILIRUB SERPL-MCNC: 0.4 MG/DL — SIGNIFICANT CHANGE UP (ref 0.2–1.2)
BUN SERPL-MCNC: 28 MG/DL — HIGH (ref 7–23)
CALCIUM SERPL-MCNC: 8.2 MG/DL — LOW (ref 8.4–10.5)
CHLORIDE SERPL-SCNC: 100 MMOL/L — SIGNIFICANT CHANGE UP (ref 96–108)
CO2 SERPL-SCNC: 23 MMOL/L — SIGNIFICANT CHANGE UP (ref 22–31)
CREAT SERPL-MCNC: 1.05 MG/DL — SIGNIFICANT CHANGE UP (ref 0.5–1.3)
CRP SERPL-MCNC: 3.58 MG/DL — HIGH (ref 0–0.4)
CULTURE RESULTS: SIGNIFICANT CHANGE UP
ERYTHROCYTE [SEDIMENTATION RATE] IN BLOOD: 26 MM/HR — HIGH
GLUCOSE BLDC GLUCOMTR-MCNC: 193 MG/DL — HIGH (ref 70–99)
GLUCOSE BLDC GLUCOMTR-MCNC: 314 MG/DL — HIGH (ref 70–99)
GLUCOSE BLDC GLUCOMTR-MCNC: 316 MG/DL — HIGH (ref 70–99)
GLUCOSE BLDC GLUCOMTR-MCNC: 329 MG/DL — HIGH (ref 70–99)
GLUCOSE SERPL-MCNC: 269 MG/DL — HIGH (ref 70–99)
HCT VFR BLD CALC: 28.4 % — LOW (ref 39–50)
HGB BLD-MCNC: 8.1 G/DL — LOW (ref 13–17)
MCHC RBC-ENTMCNC: 19.8 PG — LOW (ref 27–34)
MCHC RBC-ENTMCNC: 28.5 GM/DL — LOW (ref 32–36)
MCV RBC AUTO: 69.3 FL — LOW (ref 80–100)
METHOD TYPE: SIGNIFICANT CHANGE UP
NIGHT BLUE STAIN TISS: SIGNIFICANT CHANGE UP
NRBC # BLD: 0 /100 WBCS — SIGNIFICANT CHANGE UP (ref 0–0)
ORGANISM # SPEC MICROSCOPIC CNT: SIGNIFICANT CHANGE UP
ORGANISM # SPEC MICROSCOPIC CNT: SIGNIFICANT CHANGE UP
PLATELET # BLD AUTO: 384 K/UL — SIGNIFICANT CHANGE UP (ref 150–400)
POTASSIUM SERPL-MCNC: 4.5 MMOL/L — SIGNIFICANT CHANGE UP (ref 3.5–5.3)
POTASSIUM SERPL-SCNC: 4.5 MMOL/L — SIGNIFICANT CHANGE UP (ref 3.5–5.3)
PROT SERPL-MCNC: 6.4 G/DL — SIGNIFICANT CHANGE UP (ref 6–8.3)
RBC # BLD: 4.1 M/UL — LOW (ref 4.2–5.8)
RBC # FLD: 19.1 % — HIGH (ref 10.3–14.5)
SODIUM SERPL-SCNC: 133 MMOL/L — LOW (ref 135–145)
SPECIMEN SOURCE: SIGNIFICANT CHANGE UP
WBC # BLD: 9.3 K/UL — SIGNIFICANT CHANGE UP (ref 3.8–10.5)
WBC # FLD AUTO: 9.3 K/UL — SIGNIFICANT CHANGE UP (ref 3.8–10.5)

## 2020-09-05 PROCEDURE — 99232 SBSQ HOSP IP/OBS MODERATE 35: CPT

## 2020-09-05 PROCEDURE — 99232 SBSQ HOSP IP/OBS MODERATE 35: CPT | Mod: GC

## 2020-09-05 RX ORDER — INSULIN LISPRO 100/ML
VIAL (ML) SUBCUTANEOUS
Refills: 0 | Status: DISCONTINUED | OUTPATIENT
Start: 2020-09-05 | End: 2020-09-18

## 2020-09-05 RX ORDER — CEFEPIME 1 G/1
INJECTION, POWDER, FOR SOLUTION INTRAMUSCULAR; INTRAVENOUS
Refills: 0 | Status: DISCONTINUED | OUTPATIENT
Start: 2020-09-06 | End: 2020-09-18

## 2020-09-05 RX ORDER — INSULIN GLARGINE 100 [IU]/ML
10 INJECTION, SOLUTION SUBCUTANEOUS AT BEDTIME
Refills: 0 | Status: DISCONTINUED | OUTPATIENT
Start: 2020-09-05 | End: 2020-09-06

## 2020-09-05 RX ORDER — DAPTOMYCIN 500 MG/10ML
675 INJECTION, POWDER, LYOPHILIZED, FOR SOLUTION INTRAVENOUS EVERY 24 HOURS
Refills: 0 | Status: DISCONTINUED | OUTPATIENT
Start: 2020-09-05 | End: 2020-09-18

## 2020-09-05 RX ORDER — CEFEPIME 1 G/1
2000 INJECTION, POWDER, FOR SOLUTION INTRAMUSCULAR; INTRAVENOUS EVERY 8 HOURS
Refills: 0 | Status: DISCONTINUED | OUTPATIENT
Start: 2020-09-06 | End: 2020-09-18

## 2020-09-05 RX ORDER — INSULIN LISPRO 100/ML
6 VIAL (ML) SUBCUTANEOUS
Refills: 0 | Status: DISCONTINUED | OUTPATIENT
Start: 2020-09-05 | End: 2020-09-07

## 2020-09-05 RX ORDER — HYDROMORPHONE HYDROCHLORIDE 2 MG/ML
0.5 INJECTION INTRAMUSCULAR; INTRAVENOUS; SUBCUTANEOUS
Refills: 0 | Status: DISCONTINUED | OUTPATIENT
Start: 2020-09-05 | End: 2020-09-08

## 2020-09-05 RX ORDER — CEFEPIME 1 G/1
2000 INJECTION, POWDER, FOR SOLUTION INTRAMUSCULAR; INTRAVENOUS ONCE
Refills: 0 | Status: COMPLETED | OUTPATIENT
Start: 2020-09-05 | End: 2020-09-06

## 2020-09-05 RX ADMIN — INSULIN GLARGINE 10 UNIT(S): 100 INJECTION, SOLUTION SUBCUTANEOUS at 22:01

## 2020-09-05 RX ADMIN — Medication 8: at 11:53

## 2020-09-05 RX ADMIN — GABAPENTIN 100 MILLIGRAM(S): 400 CAPSULE ORAL at 07:13

## 2020-09-05 RX ADMIN — Medication 650 MILLIGRAM(S): at 18:01

## 2020-09-05 RX ADMIN — HYDROMORPHONE HYDROCHLORIDE 0.5 MILLIGRAM(S): 2 INJECTION INTRAMUSCULAR; INTRAVENOUS; SUBCUTANEOUS at 14:24

## 2020-09-05 RX ADMIN — Medication 8: at 18:00

## 2020-09-05 RX ADMIN — Medication 81 MILLIGRAM(S): at 18:00

## 2020-09-05 RX ADMIN — Medication 166.67 MILLIGRAM(S): at 09:45

## 2020-09-05 RX ADMIN — Medication 81 MILLIGRAM(S): at 07:13

## 2020-09-05 RX ADMIN — GABAPENTIN 100 MILLIGRAM(S): 400 CAPSULE ORAL at 14:25

## 2020-09-05 RX ADMIN — TAMSULOSIN HYDROCHLORIDE 0.4 MILLIGRAM(S): 0.4 CAPSULE ORAL at 22:02

## 2020-09-05 RX ADMIN — GABAPENTIN 100 MILLIGRAM(S): 400 CAPSULE ORAL at 22:02

## 2020-09-05 RX ADMIN — HYDROMORPHONE HYDROCHLORIDE 0.5 MILLIGRAM(S): 2 INJECTION INTRAMUSCULAR; INTRAVENOUS; SUBCUTANEOUS at 18:01

## 2020-09-05 RX ADMIN — Medication 650 MILLIGRAM(S): at 07:13

## 2020-09-05 RX ADMIN — Medication 8: at 08:52

## 2020-09-05 RX ADMIN — SPIRONOLACTONE 25 MILLIGRAM(S): 25 TABLET, FILM COATED ORAL at 07:13

## 2020-09-05 RX ADMIN — MORPHINE SULFATE 2 MILLIGRAM(S): 50 CAPSULE, EXTENDED RELEASE ORAL at 01:48

## 2020-09-05 RX ADMIN — Medication 650 MILLIGRAM(S): at 23:22

## 2020-09-05 RX ADMIN — Medication 6 UNIT(S): at 18:01

## 2020-09-05 RX ADMIN — PRASUGREL 10 MILLIGRAM(S): 5 TABLET, FILM COATED ORAL at 11:27

## 2020-09-05 RX ADMIN — Medication 650 MILLIGRAM(S): at 19:30

## 2020-09-05 RX ADMIN — POLYETHYLENE GLYCOL 3350 17 GRAM(S): 17 POWDER, FOR SOLUTION ORAL at 11:25

## 2020-09-05 RX ADMIN — Medication 650 MILLIGRAM(S): at 14:24

## 2020-09-05 RX ADMIN — ROSUVASTATIN CALCIUM 10 MILLIGRAM(S): 5 TABLET ORAL at 22:02

## 2020-09-05 RX ADMIN — Medication 25 MILLIGRAM(S): at 07:13

## 2020-09-05 RX ADMIN — MORPHINE SULFATE 2 MILLIGRAM(S): 50 CAPSULE, EXTENDED RELEASE ORAL at 01:33

## 2020-09-05 RX ADMIN — Medication 650 MILLIGRAM(S): at 02:00

## 2020-09-05 RX ADMIN — CYCLOBENZAPRINE HYDROCHLORIDE 5 MILLIGRAM(S): 10 TABLET, FILM COATED ORAL at 22:01

## 2020-09-05 RX ADMIN — Medication 0.12 MILLIGRAM(S): at 07:13

## 2020-09-05 RX ADMIN — HYDROMORPHONE HYDROCHLORIDE 0.5 MILLIGRAM(S): 2 INJECTION INTRAMUSCULAR; INTRAVENOUS; SUBCUTANEOUS at 03:30

## 2020-09-05 RX ADMIN — HYDROMORPHONE HYDROCHLORIDE 0.5 MILLIGRAM(S): 2 INJECTION INTRAMUSCULAR; INTRAVENOUS; SUBCUTANEOUS at 11:52

## 2020-09-05 RX ADMIN — OXYCODONE HYDROCHLORIDE 10 MILLIGRAM(S): 5 TABLET ORAL at 08:53

## 2020-09-05 RX ADMIN — Medication 650 MILLIGRAM(S): at 07:44

## 2020-09-05 RX ADMIN — Medication 650 MILLIGRAM(S): at 11:25

## 2020-09-05 RX ADMIN — HYDROMORPHONE HYDROCHLORIDE 0.5 MILLIGRAM(S): 2 INJECTION INTRAMUSCULAR; INTRAVENOUS; SUBCUTANEOUS at 19:30

## 2020-09-05 RX ADMIN — HYDROMORPHONE HYDROCHLORIDE 0.5 MILLIGRAM(S): 2 INJECTION INTRAMUSCULAR; INTRAVENOUS; SUBCUTANEOUS at 03:15

## 2020-09-05 RX ADMIN — Medication 650 MILLIGRAM(S): at 01:11

## 2020-09-05 RX ADMIN — HYDROMORPHONE HYDROCHLORIDE 0.5 MILLIGRAM(S): 2 INJECTION INTRAMUSCULAR; INTRAVENOUS; SUBCUTANEOUS at 07:49

## 2020-09-05 RX ADMIN — HYDROMORPHONE HYDROCHLORIDE 0.5 MILLIGRAM(S): 2 INJECTION INTRAMUSCULAR; INTRAVENOUS; SUBCUTANEOUS at 07:36

## 2020-09-05 NOTE — PROGRESS NOTE ADULT - ASSESSMENT
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  He is now s/p arthroscopic I&D in view of prior difficulties with his incision/wound and other co-morbidities.  Unfortunately, he was on cipro while this occurred - no organisms seen on initial gram stain.  Although infection at present may represent persistence from November (MRSA), cannot presume, particularly in view of ST compromise.  He has had extensive antibiotic exposure in the interim and has spent a long time on inpatient services.  He had transaminitis with rifampin in past - would not attempt to add unless Staph grows.   9 day s/p right knee arthroscopic I and D, 7/17/2020  4 days s/p right knee explant, 7/22/2020

## 2020-09-05 NOTE — PROGRESS NOTE ADULT - ASSESSMENT
Assessment: 65M PMHx CAD s/p CABG, CHF, DM, HLD, HTN, acute cholecystitis s/p perc sudhir 2/28, recent R TKA 7/22 presents with R knee infection. General surgery consulted for purulent drainage near prior perc sudhir tube site.     s/p POD0 R hip hardware change by Ortho     Patient was planning on having GB however this was deferred given other ongoing medical conditions.     Patient currently afebrile, WBC 8, stable Alk chiara 167 otherwise normal LFTs  CT scan was done yesterday, did not visualized the gall bladder which is expected after per sudhir, as the gall bladder will be contracted and scarred  No abdominal pain, some drainage could be expressed today,  was cultured yesterday and showed a few pseudomonas cells, ID is on board, the patient is on Vanc and ID may add another abx    Discussed with Dr. Rubi and chief resident, will open the wound at the bedside once the patient comes from PACU and do some deroofing and wash out        Recommendations:  - HIDA scan to get a better idea of anatomy of the Hepatobiliary system (ordered after consultation and agreement with the Primary team over the phone)  - Dressing Changes  - FU on Final culture  - Surgery will continue to FU

## 2020-09-05 NOTE — PROGRESS NOTE ADULT - SUBJECTIVE AND OBJECTIVE BOX
Interval Events: Reviewed  Patient seen and examined at bedside.    Patient is a 65y old  Male who presents with a chief complaint of R Knee Swelling (05 Sep 2020 08:48)  he is feeling better    PAST MEDICAL & SURGICAL HISTORY:  Diabetic neuropathy  STEMI (ST elevation myocardial infarction)  Diverticulitis  MRSA bacteremia  History of celiac disease  CHF (congestive heart failure): EF ~ 25%  HTN (hypertension)  Diabetes: on insulin pump  Blood clot due to device, implant, or graft: was on blood thinners  HLD (hyperlipidemia)  Osteoarthritis  Atherosclerosis of coronary artery: CAD (coronary artery disease)  Status post percutaneous transluminal coronary angioplasty: in   History of open reduction and internal fixation (ORIF) procedure  Surgery, elective: Right shoulder  Surgery, elective: right knee wound debridement  S/P TKR (total knee replacement), right: with infection Mrsa   per pt he was cleared from MRSA infection  S/P CABG x 1:   Stented coronary artery: 10/18 heart attack  INFERIOR WALL MI  Other postprocedural status: Fixation hardware in foot LEFT      MEDICATIONS:  Pulmonary:    Antimicrobials:  rifAMPin 300 milliGRAM(s) Oral every 12 hours  vancomycin  IVPB 1250 milliGRAM(s) IV Intermittent every 24 hours    Anticoagulants:  aspirin  chewable 81 milliGRAM(s) Oral two times a day  prasugrel 10 milliGRAM(s) Oral daily    Cardiac:  digoxin     Tablet 0.125 milliGRAM(s) Oral daily  metoprolol succinate ER 25 milliGRAM(s) Oral daily  spironolactone 25 milliGRAM(s) Oral daily  tamsulosin 0.4 milliGRAM(s) Oral at bedtime      Allergies    ACE inhibitors (Hives)  carvedilol (Other)  enalapril (Hives)  Entresto (Other)    Intolerances        Vital Signs Last 24 Hrs  T(C): 36.3 (05 Sep 2020 09:30), Max: 37.2 (04 Sep 2020 21:21)  T(F): 97.3 (05 Sep 2020 09:30), Max: 98.9 (04 Sep 2020 21:21)  HR: 78 (05 Sep 2020 11:24) (78 - 100)  BP: 93/51 (05 Sep 2020 11:24) (93/51 - 121/69)  BP(mean): 86 (04 Sep 2020 14:52) (77 - 91)  RR: 16 (05 Sep 2020 09:30) (14 - 17)  SpO2: 94% (05 Sep 2020 09:30) (94% - 100%)     @ 07:01  -   @ 07:00  --------------------------------------------------------  IN: 350 mL / OUT: 650 mL / NET: -300 mL          Review of Systems:   •	General: negative  •	Skin/Breast: negative  •	Ophthalmologic: negative  •	ENMT: negative  •	Respiratory and Thorax: negative  •	Cardiovascular: negative  •	Gastrointestinal: negative  •	Genitourinary: negative  •	Musculoskeletal: negative  •	Neurological: negative  •	Psychiatric: negative  •	Hematology/Lymphatics: negative  •	Endocrine: negative  •	Allergic/Immunologic: negative    Physical Exam:   • Constitutional:	Well-developed, well nourished  • Eyes:	EOMI; PERRL; no drainage or redness  • ENMT:	No oral lesions; no gross abnormalities  • Neck	No bruits; no thyromegaly or nodules  • Breasts:	not examined  • Back:	No deformity or limitation of movement  • Respiratory:	Breath Sounds equal & clear to percussion & auscultation, no accessory muscle use  • Cardiovascular:	Regular rate & rhythm, normal S1, S2; no murmurs, gallops or rubs; no S3, S4  • Gastrointestinal:	Soft, non-tender, no hepatosplenomegaly, normal bowel sounds  • Genitourinary:	not examined  • Rectal: not examined  • Extremities:	No cyanosis, clubbing or edema  • Vascular:	Equal and normal pulses (carotid, femoral, dorsalis pedis)  • Neurologica:l	not examined  • Skin:	No lesions; no rash  • Lymph Nodes:	No lymphadedenopathy  • Musculoskeletal:	No joint pain, swelling or deformity; no limitation of movement        LABS:      CBC Full  -  ( 05 Sep 2020 06:53 )  WBC Count : 9.30 K/uL  RBC Count : 4.10 M/uL  Hemoglobin : 8.1 g/dL  Hematocrit : 28.4 %  Platelet Count - Automated : 384 K/uL  Mean Cell Volume : 69.3 fl  Mean Cell Hemoglobin : 19.8 pg  Mean Cell Hemoglobin Concentration : 28.5 gm/dL  Auto Neutrophil # : x  Auto Lymphocyte # : x  Auto Monocyte # : x  Auto Eosinophil # : x  Auto Basophil # : x  Auto Neutrophil % : x  Auto Lymphocyte % : x  Auto Monocyte % : x  Auto Eosinophil % : x  Auto Basophil % : x        133<L>  |  100  |  28<H>  ----------------------------<  269<H>  4.5   |  23  |  1.05    Ca    8.2<L>      05 Sep 2020 06:53  Mg     2.0         TPro  6.4  /  Alb  3.0<L>  /  TBili  0.4  /  DBili  x   /  AST  26  /  ALT  24  /  AlkPhos  153<H>            Urinalysis Basic - ( 04 Sep 2020 06:37 )    Color: Yellow / Appearance: Clear / S.020 / pH: x  Gluc: x / Ketone: NEGATIVE  / Bili: Negative / Urobili: 0.2 E.U./dL   Blood: x / Protein: Trace mg/dL / Nitrite: NEGATIVE   Leuk Esterase: NEGATIVE / RBC: < 5 /HPF / WBC < 5 /HPF   Sq Epi: x / Non Sq Epi: 0-5 /HPF / Bacteria: Present /HPF              Culture Results:   Rare Pseudomonas aeruginosa  Rare Enterococcus faecium  Susceptibility to follow.  Please use a sterile cup for fluid specimens ( @ 19:06)  Culture Results:   Rare Pseudomonas aeruginosa  Susceptibility to follow. ( @ 19:06)  Culture Results:   Culture in progress ( @ 15:30)  Culture Results:   Culture in progress ( @ 15:18)  Culture Results:   No growth to date ( @ 15:17)  Culture Results:   Culture in progress ( @ 15:14)  Culture Results:   No growth to date ( @ 15:11)  Culture Results:   Testing in progress ( @ 01:44)  Culture Results:   Few Pseudomonas aeruginosa  Rare to few Enterococcus faecium  Susceptibility to follow.  Culture in progress ( @ 16:41)      RADIOLOGY & ADDITIONAL STUDIES (The following images were personally reviewed):  Loja:                                     No  Urine output:                       adequate  DVT prophylaxis:                 Yes  Flattus:                                  Yes  Bowel movement:              No

## 2020-09-05 NOTE — PROGRESS NOTE ADULT - SUBJECTIVE AND OBJECTIVE BOX
Subjective: C/o pain overnight but controlled, improving after pain medication. WTD dressing came off overnight, replaced this morning. S/p bedside drainage of abdominal wound by general surgery yesterday      Pt comfortable without complaints, notes some pain at the right hip which improves with pain medication. Denies CP, SOB, N/V, numbness/tingling       Vital Signs Last 24 Hrs  T(C): 37.1 (05 Sep 2020 03:58), Max: 37.2 (04 Sep 2020 21:21)  T(F): 98.7 (05 Sep 2020 03:58), Max: 98.9 (04 Sep 2020 21:21)  HR: 87 (05 Sep 2020 03:58) (86 - 100)  BP: 104/59 (05 Sep 2020 03:58) (97/54 - 121/69)  BP(mean): 86 (04 Sep 2020 14:52) (77 - 91)  RR: 14 (05 Sep 2020 03:58) (14 - 17)  SpO2: 97% (05 Sep 2020 03:58) (95% - 100%)      General: Pt alert and oriented, NAD  RLE: R hip dressing c/d/i. Dressing in place over knee, removed- granulation tissue present w/o drainage or surrounding erythema. Dressing C/D/I: gauze/tegaderm at the right hip   LLE: vascular wound present, managed by plastic surg w/ WTD dressing. Dressing replaced this AM  Pulses: 2+ DP pulses present   Sensation: intact to light touch   Motor: EHL/FHL/TA/GS firing bilaterally                                      8.1    9.30  )-----------( 384      ( 05 Sep 2020 06:53 )             28.4   09-05    133<L>  |  100  |  28<H>  ----------------------------<  269<H>  4.5   |  23  |  1.05    Ca    8.2<L>      05 Sep 2020 06:53  Mg     2.0     09-04    TPro  6.4  /  Alb  3.0<L>  /  TBili  0.4  /  DBili  x   /  AST  26  /  ALT  24  /  AlkPhos  153<H>  09-05                   A/P: 65yMale POD#1 s/p Right hip VINOD  - Pain Control: IV and PO PRN   - DVT ppx: 81mg BID, effient   - Post op abx: vanco 1250mg q24 hrs  - PT, WBS: WBAT RLE   - F/u ID recs- currently on vanc/rifampin  - F/u AM CMP  - F/u gen surg recs    Ortho Pager [phone]

## 2020-09-05 NOTE — PROGRESS NOTE ADULT - PROBLEM SELECTOR PLAN 2
9 day s/p arthroscopic I and D, 7/17/2020.  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 right knee explant and antibiotic spacer.  Will follow with IID regarding the right hip cultures 9 day s/p arthroscopic I and D, 7/17/2020.  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 right knee explant and antibiotic spacer.  Will follow with IID regarding the right hip cultures which is negative to date and does not look infected

## 2020-09-05 NOTE — PROGRESS NOTE ADULT - ATTENDING COMMENTS
Patient seen and examined with house-staff during bedside rounds.  Resident note read, including vitals, physical findings, laboratory data, and radiological reports.   Revisions included below.  Direct personal management at bed side and extensive interpretation of the data.  Plan was outlined and discussed in details with the housestaff.  Decision making of high complexity  Action taken for acute disease activity to reflect the level of care provided:  - medication reconciliation  - review laboratory data  start ativan as needed Patient seen and examined with house-staff during bedside rounds.  Resident note read, including vitals, physical findings, laboratory data, and radiological reports.   Revisions included below.  Direct personal management at bed side and extensive interpretation of the data.  Plan was outlined and discussed in details with the housestaff.  Decision making of high complexity  Action taken for acute disease activity to reflect the level of care provided:  - medication reconciliation  - review laboratory data  start ativan as needed  The

## 2020-09-05 NOTE — PROGRESS NOTE ADULT - SUBJECTIVE AND OBJECTIVE BOX
SUBJECTIVE: Patient seen and examined bedside by chief resident.    aspirin  chewable 81 milliGRAM(s) Oral two times a day  cefepime   IVPB      cefepime   IVPB 2000 milliGRAM(s) IV Intermittent once  DAPTOmycin IVPB 675 milliGRAM(s) IV Intermittent every 24 hours  digoxin     Tablet 0.125 milliGRAM(s) Oral daily  metoprolol succinate ER 25 milliGRAM(s) Oral daily  prasugrel 10 milliGRAM(s) Oral daily  rifAMPin 300 milliGRAM(s) Oral every 12 hours  spironolactone 25 milliGRAM(s) Oral daily  tamsulosin 0.4 milliGRAM(s) Oral at bedtime    MEDICATIONS  (PRN):  aluminum hydroxide/magnesium hydroxide/simethicone Suspension 30 milliLiter(s) Oral four times a day PRN Indigestion  cyclobenzaprine 5 milliGRAM(s) Oral three times a day PRN Muscle Spasm  dextrose 40% Gel 15 Gram(s) Oral once PRN Blood Glucose LESS THAN 70 milliGRAM(s)/deciliter  glucagon  Injectable 1 milliGRAM(s) IntraMuscular once PRN Glucose LESS THAN 70 milligrams/deciliter  HYDROmorphone  Injectable 0.5 milliGRAM(s) IV Push every 3 hours PRN Breakthrough pain  ondansetron Injectable 4 milliGRAM(s) IV Push every 6 hours PRN Nausea and/or Vomiting  oxyCODONE    IR 5 milliGRAM(s) Oral every 4 hours PRN Moderate Pain (4 - 6)  oxyCODONE    IR 10 milliGRAM(s) Oral every 4 hours PRN Severe Pain (7 - 10)  senna 2 Tablet(s) Oral at bedtime PRN Constipation      I&O's Detail    04 Sep 2020 07:  -  05 Sep 2020 07:00  --------------------------------------------------------  IN:    lactated ringers.: 350 mL  Total IN: 350 mL    OUT:    Voided: 650 mL  Total OUT: 650 mL    Total NET: -300 mL      05 Sep 2020 07:  -  05 Sep 2020 23:48  --------------------------------------------------------  IN:  Total IN: 0 mL    OUT:    Voided: 1150 mL  Total OUT: 1150 mL    Total NET: -1150 mL          Vital Signs Last 24 Hrs  T(C): 37 (05 Sep 2020 21:30), Max: 37.1 (05 Sep 2020 03:58)  T(F): 98.6 (05 Sep 2020 21:30), Max: 98.7 (05 Sep 2020 03:58)  HR: 78 (05 Sep 2020 21:30) (75 - 96)  BP: 92/49 (05 Sep 2020 21:30) (92/49 - 104/59)  BP(mean): --  RR: 16 (05 Sep 2020 21:30) (14 - 16)  SpO2: 99% (05 Sep 2020 21:30) (94% - 99%)    General: NAD, resting comfortably in bed  C/V: Regular rate  Pulm: Nonlabored breathing, no respiratory distress  Abd: soft, ND, NT  Extrem: WWP, no edema, SCDs in place    LABS:                        8.1    9.30  )-----------( 384      ( 05 Sep 2020 06:53 )             28.4     09-05    133<L>  |  100  |  28<H>  ----------------------------<  269<H>  4.5   |  23  |  1.05    Ca    8.2<L>      05 Sep 2020 06:53  Mg     2.0     09-04    TPro  6.4  /  Alb  3.0<L>  /  TBili  0.4  /  DBili  x   /  AST  26  /  ALT  24  /  AlkPhos  153<H>  09-05      Urinalysis Basic - ( 04 Sep 2020 06:37 )    Color: Yellow / Appearance: Clear / S.020 / pH: x  Gluc: x / Ketone: NEGATIVE  / Bili: Negative / Urobili: 0.2 E.U./dL   Blood: x / Protein: Trace mg/dL / Nitrite: NEGATIVE   Leuk Esterase: NEGATIVE / RBC: < 5 /HPF / WBC < 5 /HPF   Sq Epi: x / Non Sq Epi: 0-5 /HPF / Bacteria: Present /HPF        RADIOLOGY & ADDITIONAL STUDIES:      Culture - Fungal, Tissue (collected 20 @ 22:31)  Source: .Tissue (5)Right Hip Tissue  Preliminary Report (20 @ 15:09):    Testing in progress    Culture - Acid Fast - Tissue w/Smear (collected 20 @ 22:31)  Source: .Tissue (5)Right Hip Tissue    Culture - Acid Fast - Body Fluid w/Smear (collected 20 @ 22:31)  Source: .Body Fluid (4)Right Hip Curratage    Culture - Fungal, Body Fluid (collected 20 @ 22:31)  Source: .Body Fluid (4)Right Hip Curratage  Preliminary Report (20 @ 15:03):    Testing in progress    Culture - Fungal, Body Fluid (collected 20 @ 22:31)  Source: .Body Fluid (3)Right Hip Aspirated Fluid  Preliminary Report (20 @ 15:03):    Testing in progress    Culture - Acid Fast - Body Fluid w/Smear (collected 20 @ 22:31)  Source: .Body Fluid (3)Right Hip Aspirated Fluid    Culture - Fungal, Other (collected 20 @ 22:31)  Source: .Other (2)Right Hip Log Screw  Preliminary Report (20 @ 15:20):    Testing in progress    Culture - Acid Fast - Other w/Smear (collected 20 @ 22:31)  Source: .Other (2)Right Hip Log Screw    Culture - Acid Fast - Other w/Smear (collected 20 @ 22:31)  Source: .Other (1)Right Hip Interlocking Screw    Culture - Fungal, Other (collected 20 @ 22:31)  Source: .Other (1)Right Hip Interlocking Screw  Preliminary Report (20 @ 15:09):    Testing in progress    Culture - Surgical Swab (collected 20 @ 19:06)  Source: .Surgical Swab abdominal wall abscess  Gram Stain (20 @ 20:04):    No organisms seen    Moderate WBC's  Preliminary Report (20 @ 14:04):    Rare Pseudomonas aeruginosa    Susceptibility to follow.    Culture in progress    Culture - Body Fluid with Gram Stain (collected 20 @ 19:06)  Source: Abdominal Fl Abdominal Fluid  Gram Stain (20 @ 20:01):    No organisms seen    Moderate WBC's  Preliminary Report (20 @ 10:08):    Rare Pseudomonas aeruginosa    Rare Enterococcus faecium    Susceptibility to follow.    Please use a sterile cup for fluid specimens    Culture - Tissue with Gram Stain (collected 20 @ 15:30)  Source: .Tissue (2)Right Hip Log Screw  Gram Stain (20 @ 15:31):    Test cannot be performed on this type of specimen.  Preliminary Report (20 @ 09:08):    Culture in progress    Culture - Body Fluid with Gram Stain (collected 20 @ 15:18)  Source: .Body Fluid (4)Right Hip Curratage  Gram Stain (20 @ 16:39):    No organisms seen    Few WBC's  Preliminary Report (20 @ 08:56):    Culture in progress    Culture - Body Fluid with Gram Stain (collected 20 @ 15:17)  Source: .Body Fluid (3)Right Hip Aspirated Fluid  Gram Stain (20 @ 16:14):    No organisms seen    Rare WBC's  Preliminary Report (20 @ 09:00):    No growth to date    Culture - Tissue with Gram Stain (collected 20 @ 15:14)  Source: .Tissue (1)Right Hip Interlocking Screw  Gram Stain (20 @ 15:32):    Test cannot be performed on this type of specimen.  Preliminary Report (20 @ 09:08):    Culture in progress    Culture - Tissue with Gram Stain (collected 20 @ 15:11)  Source: .Tissue (5)Right Hip Tissue  Gram Stain (20 @ 16:10):    No organisms seen    No WBC's seen.  Preliminary Report (20 @ 08:46):    No growth to date    Culture - Fungal, Body Fluid (collected 20 @ 01:44)  Source: .Body Fluid abdominal drain RUQ  Preliminary Report (20 @ 08:14):    Testing in progress    Culture - Body Fluid with Gram Stain (collected 20 @ 16:41)  Source: Abdominal Fl abdominal drain RUQ  Gram Stain (20 @ 20:35):    No organisms seen    Moderate WBC's  Final Report (20 @ 11:07):    Few Pseudomonas aeruginosa    Rare to few Enterococcus faecium    Susceptibility to follow.    Culture in progress  Organism: Pseudomonas aeruginosa (20 @ 09:34)  Organism: Pseudomonas aeruginosa (20 @ 09:34)      -  Aztreonam: S 8      -  Cefepime: S <=2      -  Ciprofloxacin: S <=0.25      -  Gentamicin: S 4      -  Piperacillin/Tazobactam: S 16      -  Tobramycin: S <=2      Method Type: YOLIE    Culture - Blood (collected 20 @ 19:08)  Source: .Blood Blood  Preliminary Report (20 @ 20:00):    No growth at 4 days.    Culture - Blood (collected 20 @ 19:08)  Source: .Blood Blood  Preliminary Report (20 @ 20:00):    No growth at 4 days. SUBJECTIVE: Patient seen and examined bedside by chief resident, feels well, conversational. Surgery was consulted for abscess near old perc sudhir site. I and D was done yesterday yielding 2cc of pus. Today, the patient had no complains regarding the site.     Dressing was not soaked in blood or discharge. Dressing opened and the site visualized. Iodoform packing removed, which was tinged with a little yellowish fluid. Wound cleaned with betadine and iodoform packed, covered with gauze.     aspirin  chewable 81 milliGRAM(s) Oral two times a day  cefepime   IVPB      cefepime   IVPB 2000 milliGRAM(s) IV Intermittent once  DAPTOmycin IVPB 675 milliGRAM(s) IV Intermittent every 24 hours  digoxin     Tablet 0.125 milliGRAM(s) Oral daily  metoprolol succinate ER 25 milliGRAM(s) Oral daily  prasugrel 10 milliGRAM(s) Oral daily  rifAMPin 300 milliGRAM(s) Oral every 12 hours  spironolactone 25 milliGRAM(s) Oral daily  tamsulosin 0.4 milliGRAM(s) Oral at bedtime    MEDICATIONS  (PRN):  aluminum hydroxide/magnesium hydroxide/simethicone Suspension 30 milliLiter(s) Oral four times a day PRN Indigestion  cyclobenzaprine 5 milliGRAM(s) Oral three times a day PRN Muscle Spasm  dextrose 40% Gel 15 Gram(s) Oral once PRN Blood Glucose LESS THAN 70 milliGRAM(s)/deciliter  glucagon  Injectable 1 milliGRAM(s) IntraMuscular once PRN Glucose LESS THAN 70 milligrams/deciliter  HYDROmorphone  Injectable 0.5 milliGRAM(s) IV Push every 3 hours PRN Breakthrough pain  ondansetron Injectable 4 milliGRAM(s) IV Push every 6 hours PRN Nausea and/or Vomiting  oxyCODONE    IR 5 milliGRAM(s) Oral every 4 hours PRN Moderate Pain (4 - 6)  oxyCODONE    IR 10 milliGRAM(s) Oral every 4 hours PRN Severe Pain (7 - 10)  senna 2 Tablet(s) Oral at bedtime PRN Constipation      I&O's Detail    04 Sep 2020 07:01  -  05 Sep 2020 07:00  --------------------------------------------------------  IN:    lactated ringers.: 350 mL  Total IN: 350 mL    OUT:    Voided: 650 mL  Total OUT: 650 mL    Total NET: -300 mL      05 Sep 2020 07:01  -  05 Sep 2020 23:48  --------------------------------------------------------  IN:  Total IN: 0 mL    OUT:    Voided: 1150 mL  Total OUT: 1150 mL    Total NET: -1150 mL          Vital Signs Last 24 Hrs  T(C): 37 (05 Sep 2020 21:30), Max: 37.1 (05 Sep 2020 03:58)  T(F): 98.6 (05 Sep 2020 21:30), Max: 98.7 (05 Sep 2020 03:58)  HR: 78 (05 Sep 2020 21:30) (75 - 96)  BP: 92/49 (05 Sep 2020 21:30) (92/49 - 104/59)  BP(mean): --  RR: 16 (05 Sep 2020 21:30) (14 - 16)  SpO2: 99% (05 Sep 2020 21:30) (94% - 99%)    General: NAD, resting comfortably in bed  C/V: Regular rate  Pulm: Nonlabored breathing, no respiratory distress  Abd: Soft, tender to palpation around the prior per sudhir site, ND, No guarding, No rebound tenderness. RUQ drain site with: minimal erythema, 3 cm induration,was able to express some milky-yellowish material (non foul-smelling)    LABS:                        8.1    9.30  )-----------( 384      ( 05 Sep 2020 06:53 )             28.4     09-05    133<L>  |  100  |  28<H>  ----------------------------<  269<H>  4.5   |  23  |  1.05    Ca    8.2<L>      05 Sep 2020 06:53  Mg     2.0     09-04    TPro  6.4  /  Alb  3.0<L>  /  TBili  0.4  /  DBili  x   /  AST  26  /  ALT  24  /  AlkPhos  153<H>        Urinalysis Basic - ( 04 Sep 2020 06:37 )    Color: Yellow / Appearance: Clear / S.020 / pH: x  Gluc: x / Ketone: NEGATIVE  / Bili: Negative / Urobili: 0.2 E.U./dL   Blood: x / Protein: Trace mg/dL / Nitrite: NEGATIVE   Leuk Esterase: NEGATIVE / RBC: < 5 /HPF / WBC < 5 /HPF   Sq Epi: x / Non Sq Epi: 0-5 /HPF / Bacteria: Present /HPF        RADIOLOGY & ADDITIONAL STUDIES:      Culture - Fungal, Tissue (collected 20 @ 22:31)  Source: .Tissue (5)Right Hip Tissue  Preliminary Report (20 @ 15:09):    Testing in progress    Culture - Acid Fast - Tissue w/Smear (collected 20 @ 22:31)  Source: .Tissue (5)Right Hip Tissue    Culture - Acid Fast - Body Fluid w/Smear (collected 20 @ 22:31)  Source: .Body Fluid (4)Right Hip Curratage    Culture - Fungal, Body Fluid (collected 20 @ 22:31)  Source: .Body Fluid (4)Right Hip Curratage  Preliminary Report (20 @ 15:03):    Testing in progress    Culture - Fungal, Body Fluid (collected 20 @ 22:31)  Source: .Body Fluid (3)Right Hip Aspirated Fluid  Preliminary Report (20 @ 15:03):    Testing in progress    Culture - Acid Fast - Body Fluid w/Smear (collected 20 @ 22:31)  Source: .Body Fluid (3)Right Hip Aspirated Fluid    Culture - Fungal, Other (collected 20 @ 22:31)  Source: .Other (2)Right Hip Log Screw  Preliminary Report (20 @ 15:20):    Testing in progress    Culture - Acid Fast - Other w/Smear (collected 20 @ 22:31)  Source: .Other (2)Right Hip Log Screw    Culture - Acid Fast - Other w/Smear (collected 20 @ 22:31)  Source: .Other (1)Right Hip Interlocking Screw    Culture - Fungal, Other (collected 20 @ 22:31)  Source: .Other (1)Right Hip Interlocking Screw  Preliminary Report (20 @ 15:09):    Testing in progress    Culture - Surgical Swab (collected 20 @ 19:06)  Source: .Surgical Swab abdominal wall abscess  Gram Stain (20 @ 20:04):    No organisms seen    Moderate WBC's  Preliminary Report (20 @ 14:04):    Rare Pseudomonas aeruginosa    Susceptibility to follow.    Culture in progress    Culture - Body Fluid with Gram Stain (collected 20 @ 19:06)  Source: Abdominal Fl Abdominal Fluid  Gram Stain (20 @ 20:01):    No organisms seen    Moderate WBC's  Preliminary Report (20 @ 10:08):    Rare Pseudomonas aeruginosa    Rare Enterococcus faecium    Susceptibility to follow.    Please use a sterile cup for fluid specimens    Culture - Tissue with Gram Stain (collected 20 @ 15:30)  Source: .Tissue (2)Right Hip Log Screw  Gram Stain (20 @ 15:31):    Test cannot be performed on this type of specimen.  Preliminary Report (20 @ 09:08):    Culture in progress    Culture - Body Fluid with Gram Stain (collected 20 @ 15:18)  Source: .Body Fluid (4)Right Hip Curratage  Gram Stain (20 @ 16:39):    No organisms seen    Few WBC's  Preliminary Report (20 @ 08:56):    Culture in progress    Culture - Body Fluid with Gram Stain (collected 20 @ 15:17)  Source: .Body Fluid (3)Right Hip Aspirated Fluid  Gram Stain (20 @ 16:14):    No organisms seen    Rare WBC's  Preliminary Report (20 @ 09:00):    No growth to date    Culture - Tissue with Gram Stain (collected 20 @ 15:14)  Source: .Tissue (1)Right Hip Interlocking Screw  Gram Stain (20 @ 15:32):    Test cannot be performed on this type of specimen.  Preliminary Report (20 @ 09:08):    Culture in progress    Culture - Tissue with Gram Stain (collected 20 @ 15:11)  Source: .Tissue (5)Right Hip Tissue  Gram Stain (20 @ 16:10):    No organisms seen    No WBC's seen.  Preliminary Report (20 @ 08:46):    No growth to date    Culture - Fungal, Body Fluid (collected 20 @ 01:44)  Source: .Body Fluid abdominal drain RUQ  Preliminary Report (20 @ 08:14):    Testing in progress    Culture - Body Fluid with Gram Stain (collected 20 @ 16:41)  Source: Abdominal Fl abdominal drain RUQ  Gram Stain (20 @ 20:35):    No organisms seen    Moderate WBC's  Final Report (20 @ 11:07):    Few Pseudomonas aeruginosa    Rare to few Enterococcus faecium    Susceptibility to follow.    Culture in progress  Organism: Pseudomonas aeruginosa (20 @ 09:34)  Organism: Pseudomonas aeruginosa (20 @ 09:34)      -  Aztreonam: S 8      -  Cefepime: S <=2      -  Ciprofloxacin: S <=0.25      -  Gentamicin: S 4      -  Piperacillin/Tazobactam: S 16      -  Tobramycin: S <=2      Method Type: YOLIE    Culture - Blood (collected 20 @ 19:08)  Source: .Blood Blood  Preliminary Report (20 @ 20:00):    No growth at 4 days.    Culture - Blood (collected 20 @ 19:08)  Source: .Blood Blood  Preliminary Report (20 @ 20:00):    No growth at 4 days.

## 2020-09-05 NOTE — PROGRESS NOTE ADULT - ASSESSMENT
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  He has completed 6 weeks of vancomycin & rifampin.  He is now s/p R hip exchange of hardware.  I&D of abd wall done last night - cultures growing Pseudomonas and Enterococcus faecium.  Unclear if he has persistent hole in GB resulting in fluid in fossa, absence of appearance of GB on CT scan or if limited to abd wall.  If related to GB, will need to add anaerobic coverage.  He is not toxic appearing and has very benign abd exam - will hold off for now.  Though Pseudomonas isolate is susceptible to pip-tazo, YOLIE is at breakpoint.  Suggest:  - Need to clarify if he can get MRI/MRCP or if HIDA or gallium would be useful  - F/U susceptibilities from abd wall I&D  - F/U OR cultures from R hip  - Daptomycin 675 mg IV q24h.  Please check CPK  - Cefepime 2 g IV q8h  - Continue rifampin 300 mg po q12h  - D/c vancomycin  Recommendations discussed with primary team and Dr. Kwok.  Will follow with you - team 1.

## 2020-09-05 NOTE — PROGRESS NOTE ADULT - SUBJECTIVE AND OBJECTIVE BOX
INTERVAL HPI/OVERNIGHT EVENTS:  s/p OR yesterday, as well as I&D of abd wall collection    CONSTITUTIONAL:  No fever, chills, night sweats  EYES:  No photophobia or visual changes  CARDIOVASCULAR:  No chest pain  RESPIRATORY:  No cough, wheezing, or SOB   GASTROINTESTINAL:  No nausea, vomiting, diarrhea, constipation, or abdominal pain  GENITOURINARY:  No frequency, urgency, dysuria or hematuria  NEUROLOGIC:  No headache, lightheadedness      ANTIBIOTICS/RELEVANT:    Vancomycin 1.25 g IV q24h      Vital Signs Last 24 Hrs  T(C): 37 (05 Sep 2020 21:30), Max: 37.1 (04 Sep 2020 23:43)  T(F): 98.6 (05 Sep 2020 21:30), Max: 98.7 (04 Sep 2020 23:43)  HR: 78 (05 Sep 2020 21:30) (75 - 100)  BP: 92/49 (05 Sep 2020 21:30) (92/49 - 107/57)  BP(mean): --  RR: 16 (05 Sep 2020 21:30) (14 - 16)  SpO2: 99% (05 Sep 2020 21:30) (94% - 99%)    PHYSICAL EXAM:  Constitutional:  Well-developed, well nourished, non-toxic  Eyes:  Sclerae anicteric, conjunctivae clear, PERRL  Ear/Nose/Throat:  No nasal exudate or sinus tenderness;  No buccal mucosal lesions, no pharyngeal erythema or exudate	  Neck:  Supple, no adenopathy  Respiratory:  Clear bilaterally  Cardiovascular:  RRR, S1S2, no murmur appreciated  Gastrointestinal:  Symmetric, normoactive BS, soft, NT, no masses, guarding or rebound.  No HSM  Extremities:  No edema;  R hip incision dressed, R knee incision with eschar      LABS:                        8.1    9.30  )-----------( 384      ( 05 Sep 2020 06:53 )             28.4         09-05    133<L>  |  100  |  28<H>  ----------------------------<  269<H>  4.5   |  23  |  1.05    Ca    8.2<L>      05 Sep 2020 06:53  Mg     2.0     09-04    TPro  6.4  /  Alb  3.0<L>  /  TBili  0.4  /  DBili  x   /  AST  26  /  ALT  24  /  AlkPhos  153<H>  09-      Urinalysis Basic - ( 04 Sep 2020 06:37 )    Color: Yellow / Appearance: Clear / S.020 / pH: x  Gluc: x / Ketone: NEGATIVE  / Bili: Negative / Urobili: 0.2 E.U./dL   Blood: x / Protein: Trace mg/dL / Nitrite: NEGATIVE   Leuk Esterase: NEGATIVE / RBC: < 5 /HPF / WBC < 5 /HPF   Sq Epi: x / Non Sq Epi: 0-5 /HPF / Bacteria: Present /HPF        MICROBIOLOGY:  Culture Results:   Testing in progress (20 @ 22:31)  Culture Results:   Testing in progress (20 @ 22:31)  Culture Results:   Testing in progress (20 @ 22:31)  Culture Results:   Testing in progress (20 @ 22:31)  Culture Results:   Testing in progress (20 @ 22:31)        RADIOLOGY & ADDITIONAL STUDIES:

## 2020-09-05 NOTE — PROGRESS NOTE ADULT - PROBLEM SELECTOR PLAN 1
on broad spectrum antibiotics,   f/u intraop c/s  f/u vanco level and vanco was adjusted  I will discuss with ortho regarding the recent cultures from the right hip and is positive fro VREF and Pseudomonas.  He is on vanco and Rifampin  culture positive for MRSA with negative blood culture repeat but 4 bottles were positive on admission  JOHN PAUL unlikely endocarditis but might need to be treated as such.  The AICD bed is stable with no signs of infection  On Vanco and Rifampin, PICC line Monday.   hardware removed and follow on cultures which is negative to date  he is stable and he has area of necrosis over the wound and is followed by plastics  sepsis resolved and he is on antoibiotic on broad spectrum antibiotics,   f/u intraop c/s  f/u vanco level and vanco was adjusted  I will discuss with ortho regarding the recent cultures from the right hip and is negative but the abdominal wound cultures are positive fro VREF and Pseudomonas.  He is on vanco and Rifampin  culture positive for MRSA with negative blood culture repeat but 4 bottles were positive on admission  JOHN PAUL unlikely endocarditis but might need to be treated as such.  The AICD bed is stable with no signs of infection  On Vanco and Rifampin, PICC line Monday.   hardware removed and follow on cultures which is negative to date  he is stable and he has area of necrosis over the wound and is followed by plastics  sepsis resolved and he is on antibiotic

## 2020-09-05 NOTE — PROGRESS NOTE ADULT - ATTENDING COMMENTS
Seen by me this morning. 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 Had some hip pain overnight that is improving. Right knee is about status quo. However, he has been able to ambulate further with the walker - he feels he is only limited by the telemetry cables.    Hip dressings clean and dry, thigh not swollen  Right knee patellar wound still with fibrinous bed, unchanged appearance from yesterday  Left leg wound with Silvadene dressing intact  Right upper quadrant abdominal dressing with mild serosanguinous drainage    OR cultures from yesterday unrevealing so far    Plan is to continue with current antibiotics and trend markers. Continue PT in house. Wound care for the legs. Possibly for discharge to SNF within a few days as long as the pain stays under control and no new complications.

## 2020-09-06 LAB
-  AMPICILLIN: SIGNIFICANT CHANGE UP
-  AMPICILLIN: SIGNIFICANT CHANGE UP
-  AZTREONAM: SIGNIFICANT CHANGE UP
-  AZTREONAM: SIGNIFICANT CHANGE UP
-  CEFEPIME: SIGNIFICANT CHANGE UP
-  CEFEPIME: SIGNIFICANT CHANGE UP
-  CHLORAMPHENICOL: SIGNIFICANT CHANGE UP
-  CIPROFLOXACIN: SIGNIFICANT CHANGE UP
-  CIPROFLOXACIN: SIGNIFICANT CHANGE UP
-  DAPTOMYCIN: SIGNIFICANT CHANGE UP
-  GENTAMICIN: SIGNIFICANT CHANGE UP
-  GENTAMICIN: SIGNIFICANT CHANGE UP
-  LEVOFLOXACIN: SIGNIFICANT CHANGE UP
-  LEVOFLOXACIN: SIGNIFICANT CHANGE UP
-  LINEZOLID: SIGNIFICANT CHANGE UP
-  LINEZOLID: SIGNIFICANT CHANGE UP
-  PIPERACILLIN/TAZOBACTAM: SIGNIFICANT CHANGE UP
-  PIPERACILLIN/TAZOBACTAM: SIGNIFICANT CHANGE UP
-  TOBRAMYCIN: SIGNIFICANT CHANGE UP
-  TOBRAMYCIN: SIGNIFICANT CHANGE UP
-  VANCOMYCIN: SIGNIFICANT CHANGE UP
-  VANCOMYCIN: SIGNIFICANT CHANGE UP
ALBUMIN SERPL ELPH-MCNC: 2.7 G/DL — LOW (ref 3.3–5)
ALP SERPL-CCNC: 137 U/L — HIGH (ref 40–120)
ALT FLD-CCNC: 21 U/L — SIGNIFICANT CHANGE UP (ref 10–45)
ANION GAP SERPL CALC-SCNC: 10 MMOL/L — SIGNIFICANT CHANGE UP (ref 5–17)
AST SERPL-CCNC: 23 U/L — SIGNIFICANT CHANGE UP (ref 10–40)
BASOPHILS # BLD AUTO: 0.08 K/UL — SIGNIFICANT CHANGE UP (ref 0–0.2)
BASOPHILS NFR BLD AUTO: 0.9 % — SIGNIFICANT CHANGE UP (ref 0–2)
BILIRUB SERPL-MCNC: 0.4 MG/DL — SIGNIFICANT CHANGE UP (ref 0.2–1.2)
BUN SERPL-MCNC: 30 MG/DL — HIGH (ref 7–23)
CALCIUM SERPL-MCNC: 8.4 MG/DL — SIGNIFICANT CHANGE UP (ref 8.4–10.5)
CHLORIDE SERPL-SCNC: 102 MMOL/L — SIGNIFICANT CHANGE UP (ref 96–108)
CK SERPL-CCNC: 152 U/L — SIGNIFICANT CHANGE UP (ref 30–200)
CO2 SERPL-SCNC: 23 MMOL/L — SIGNIFICANT CHANGE UP (ref 22–31)
CREAT SERPL-MCNC: 1 MG/DL — SIGNIFICANT CHANGE UP (ref 0.5–1.3)
CRP SERPL-MCNC: 6.44 MG/DL — HIGH (ref 0–0.4)
CULTURE RESULTS: SIGNIFICANT CHANGE UP
EOSINOPHIL # BLD AUTO: 0.79 K/UL — HIGH (ref 0–0.5)
EOSINOPHIL NFR BLD AUTO: 8.9 % — HIGH (ref 0–6)
ERYTHROCYTE [SEDIMENTATION RATE] IN BLOOD: 20 MM/HR — SIGNIFICANT CHANGE UP
GLUCOSE BLDC GLUCOMTR-MCNC: 201 MG/DL — HIGH (ref 70–99)
GLUCOSE BLDC GLUCOMTR-MCNC: 204 MG/DL — HIGH (ref 70–99)
GLUCOSE BLDC GLUCOMTR-MCNC: 224 MG/DL — HIGH (ref 70–99)
GLUCOSE BLDC GLUCOMTR-MCNC: 241 MG/DL — HIGH (ref 70–99)
GLUCOSE BLDC GLUCOMTR-MCNC: 251 MG/DL — HIGH (ref 70–99)
GLUCOSE SERPL-MCNC: 203 MG/DL — HIGH (ref 70–99)
HCT VFR BLD CALC: 30.1 % — LOW (ref 39–50)
HGB BLD-MCNC: 8.3 G/DL — LOW (ref 13–17)
LYMPHOCYTES # BLD AUTO: 1.34 K/UL — SIGNIFICANT CHANGE UP (ref 1–3.3)
LYMPHOCYTES # BLD AUTO: 15 % — SIGNIFICANT CHANGE UP (ref 13–44)
MCHC RBC-ENTMCNC: 19.7 PG — LOW (ref 27–34)
MCHC RBC-ENTMCNC: 27.6 GM/DL — LOW (ref 32–36)
MCV RBC AUTO: 71.5 FL — LOW (ref 80–100)
METHOD TYPE: SIGNIFICANT CHANGE UP
MONOCYTES # BLD AUTO: 0.78 K/UL — SIGNIFICANT CHANGE UP (ref 0–0.9)
MONOCYTES NFR BLD AUTO: 8.8 % — SIGNIFICANT CHANGE UP (ref 2–14)
NEUTROPHILS # BLD AUTO: 5.92 K/UL — SIGNIFICANT CHANGE UP (ref 1.8–7.4)
NEUTROPHILS NFR BLD AUTO: 66.4 % — SIGNIFICANT CHANGE UP (ref 43–77)
ORGANISM # SPEC MICROSCOPIC CNT: SIGNIFICANT CHANGE UP
PLATELET # BLD AUTO: 330 K/UL — SIGNIFICANT CHANGE UP (ref 150–400)
POTASSIUM SERPL-MCNC: 4.3 MMOL/L — SIGNIFICANT CHANGE UP (ref 3.5–5.3)
POTASSIUM SERPL-SCNC: 4.3 MMOL/L — SIGNIFICANT CHANGE UP (ref 3.5–5.3)
PROT SERPL-MCNC: 5.9 G/DL — LOW (ref 6–8.3)
RBC # BLD: 4.21 M/UL — SIGNIFICANT CHANGE UP (ref 4.2–5.8)
RBC # FLD: 18.9 % — HIGH (ref 10.3–14.5)
SODIUM SERPL-SCNC: 135 MMOL/L — SIGNIFICANT CHANGE UP (ref 135–145)
SPECIMEN SOURCE: SIGNIFICANT CHANGE UP
WBC # BLD: 8.92 K/UL — SIGNIFICANT CHANGE UP (ref 3.8–10.5)
WBC # FLD AUTO: 8.92 K/UL — SIGNIFICANT CHANGE UP (ref 3.8–10.5)

## 2020-09-06 PROCEDURE — 78226 HEPATOBILIARY SYSTEM IMAGING: CPT | Mod: 26

## 2020-09-06 PROCEDURE — 99232 SBSQ HOSP IP/OBS MODERATE 35: CPT | Mod: GC

## 2020-09-06 PROCEDURE — 99232 SBSQ HOSP IP/OBS MODERATE 35: CPT

## 2020-09-06 RX ORDER — INSULIN GLARGINE 100 [IU]/ML
16 INJECTION, SOLUTION SUBCUTANEOUS AT BEDTIME
Refills: 0 | Status: DISCONTINUED | OUTPATIENT
Start: 2020-09-06 | End: 2020-09-08

## 2020-09-06 RX ORDER — DIAZEPAM 5 MG
5 TABLET ORAL EVERY 8 HOURS
Refills: 0 | Status: DISCONTINUED | OUTPATIENT
Start: 2020-09-06 | End: 2020-09-10

## 2020-09-06 RX ADMIN — Medication 81 MILLIGRAM(S): at 06:01

## 2020-09-06 RX ADMIN — GABAPENTIN 100 MILLIGRAM(S): 400 CAPSULE ORAL at 06:01

## 2020-09-06 RX ADMIN — Medication 650 MILLIGRAM(S): at 12:49

## 2020-09-06 RX ADMIN — CEFEPIME 100 MILLIGRAM(S): 1 INJECTION, POWDER, FOR SOLUTION INTRAMUSCULAR; INTRAVENOUS at 16:34

## 2020-09-06 RX ADMIN — Medication 6 UNIT(S): at 17:25

## 2020-09-06 RX ADMIN — Medication 4: at 17:24

## 2020-09-06 RX ADMIN — Medication 6 UNIT(S): at 07:57

## 2020-09-06 RX ADMIN — Medication 650 MILLIGRAM(S): at 06:01

## 2020-09-06 RX ADMIN — Medication 25 MILLIGRAM(S): at 06:01

## 2020-09-06 RX ADMIN — Medication 5 MILLIGRAM(S): at 12:50

## 2020-09-06 RX ADMIN — CEFEPIME 100 MILLIGRAM(S): 1 INJECTION, POWDER, FOR SOLUTION INTRAMUSCULAR; INTRAVENOUS at 07:24

## 2020-09-06 RX ADMIN — Medication 650 MILLIGRAM(S): at 06:42

## 2020-09-06 RX ADMIN — Medication 81 MILLIGRAM(S): at 17:25

## 2020-09-06 RX ADMIN — Medication 650 MILLIGRAM(S): at 23:21

## 2020-09-06 RX ADMIN — HYDROMORPHONE HYDROCHLORIDE 0.5 MILLIGRAM(S): 2 INJECTION INTRAMUSCULAR; INTRAVENOUS; SUBCUTANEOUS at 09:59

## 2020-09-06 RX ADMIN — Medication 6: at 12:50

## 2020-09-06 RX ADMIN — OXYCODONE HYDROCHLORIDE 10 MILLIGRAM(S): 5 TABLET ORAL at 01:33

## 2020-09-06 RX ADMIN — HYDROMORPHONE HYDROCHLORIDE 0.5 MILLIGRAM(S): 2 INJECTION INTRAMUSCULAR; INTRAVENOUS; SUBCUTANEOUS at 03:15

## 2020-09-06 RX ADMIN — Medication 5 MILLIGRAM(S): at 22:28

## 2020-09-06 RX ADMIN — HYDROMORPHONE HYDROCHLORIDE 0.5 MILLIGRAM(S): 2 INJECTION INTRAMUSCULAR; INTRAVENOUS; SUBCUTANEOUS at 11:00

## 2020-09-06 RX ADMIN — SPIRONOLACTONE 25 MILLIGRAM(S): 25 TABLET, FILM COATED ORAL at 07:25

## 2020-09-06 RX ADMIN — Medication 0.12 MILLIGRAM(S): at 09:59

## 2020-09-06 RX ADMIN — PRASUGREL 10 MILLIGRAM(S): 5 TABLET, FILM COATED ORAL at 12:50

## 2020-09-06 RX ADMIN — ROSUVASTATIN CALCIUM 10 MILLIGRAM(S): 5 TABLET ORAL at 21:53

## 2020-09-06 RX ADMIN — Medication 650 MILLIGRAM(S): at 19:00

## 2020-09-06 RX ADMIN — OXYCODONE HYDROCHLORIDE 10 MILLIGRAM(S): 5 TABLET ORAL at 07:25

## 2020-09-06 RX ADMIN — GABAPENTIN 100 MILLIGRAM(S): 400 CAPSULE ORAL at 16:34

## 2020-09-06 RX ADMIN — CEFEPIME 100 MILLIGRAM(S): 1 INJECTION, POWDER, FOR SOLUTION INTRAMUSCULAR; INTRAVENOUS at 23:21

## 2020-09-06 RX ADMIN — OXYCODONE HYDROCHLORIDE 10 MILLIGRAM(S): 5 TABLET ORAL at 08:00

## 2020-09-06 RX ADMIN — Medication 650 MILLIGRAM(S): at 17:26

## 2020-09-06 RX ADMIN — OXYCODONE HYDROCHLORIDE 10 MILLIGRAM(S): 5 TABLET ORAL at 19:00

## 2020-09-06 RX ADMIN — OXYCODONE HYDROCHLORIDE 10 MILLIGRAM(S): 5 TABLET ORAL at 00:35

## 2020-09-06 RX ADMIN — OXYCODONE HYDROCHLORIDE 10 MILLIGRAM(S): 5 TABLET ORAL at 17:45

## 2020-09-06 RX ADMIN — DAPTOMYCIN 127 MILLIGRAM(S): 500 INJECTION, POWDER, LYOPHILIZED, FOR SOLUTION INTRAVENOUS at 01:30

## 2020-09-06 RX ADMIN — Medication 650 MILLIGRAM(S): at 00:33

## 2020-09-06 RX ADMIN — HYDROMORPHONE HYDROCHLORIDE 0.5 MILLIGRAM(S): 2 INJECTION INTRAMUSCULAR; INTRAVENOUS; SUBCUTANEOUS at 03:00

## 2020-09-06 RX ADMIN — Medication 4: at 07:57

## 2020-09-06 RX ADMIN — CEFEPIME 100 MILLIGRAM(S): 1 INJECTION, POWDER, FOR SOLUTION INTRAMUSCULAR; INTRAVENOUS at 00:47

## 2020-09-06 RX ADMIN — TAMSULOSIN HYDROCHLORIDE 0.4 MILLIGRAM(S): 0.4 CAPSULE ORAL at 21:52

## 2020-09-06 RX ADMIN — GABAPENTIN 100 MILLIGRAM(S): 400 CAPSULE ORAL at 21:52

## 2020-09-06 RX ADMIN — INSULIN GLARGINE 16 UNIT(S): 100 INJECTION, SOLUTION SUBCUTANEOUS at 22:29

## 2020-09-06 NOTE — PROGRESS NOTE ADULT - SUBJECTIVE AND OBJECTIVE BOX
SUBJECTIVE: Patient seen and examined bedside by chief resident. Patient reports feeling well and slept without issues. Specifically denies nausea, vomit, chest pain, SOB, calves tenderness,, fever, dizziness, disuria. Good acceptance of diet without nausea, vomit or postpandrial discomfort, ambulating without issues. Flatus and bowel movement presents.     aspirin  chewable 81 milliGRAM(s) Oral two times a day  cefepime   IVPB      cefepime   IVPB 2000 milliGRAM(s) IV Intermittent every 8 hours  DAPTOmycin IVPB 675 milliGRAM(s) IV Intermittent every 24 hours  digoxin     Tablet 0.125 milliGRAM(s) Oral daily  metoprolol succinate ER 25 milliGRAM(s) Oral daily  prasugrel 10 milliGRAM(s) Oral daily  rifAMPin 300 milliGRAM(s) Oral every 12 hours  spironolactone 25 milliGRAM(s) Oral daily  tamsulosin 0.4 milliGRAM(s) Oral at bedtime      Vital Signs Last 24 Hrs  T(C): 36.4 (06 Sep 2020 09:48), Max: 37 (05 Sep 2020 21:30)  T(F): 97.6 (06 Sep 2020 09:48), Max: 98.6 (05 Sep 2020 21:30)  HR: 84 (06 Sep 2020 12:22) (75 - 91)  BP: 106/61 (06 Sep 2020 12:22) (92/49 - 108/61)  BP(mean): --  RR: 16 (06 Sep 2020 09:48) (16 - 16)  SpO2: 94% (06 Sep 2020 09:48) (94% - 100%)  I&O's Detail    05 Sep 2020 07:01  -  06 Sep 2020 07:00  --------------------------------------------------------  IN:    Solution: 50 mL    Solution: 100 mL  Total IN: 150 mL    OUT:    Voided: 1280 mL  Total OUT: 1280 mL    Total NET: -1130 mL      06 Sep 2020 07:01  -  06 Sep 2020 13:01  --------------------------------------------------------  IN:    Oral Fluid: 300 mL  Total IN: 300 mL    OUT:    Voided: 100 mL  Total OUT: 100 mL    Total NET: 200 mL          PHYSICAL EXAMINATION   General: NAD, resting comfortably in bed.   NEURO: AAOx3, follows commands.   HEENT: MMM, non icteric,   CV: RRR, no MRG,   PULM: nonlabored breathing, no respiratory distress,   ABD: soft, nondistended, nontender, no rebound, no guarding. Presence of small wound - about 1.5 cm, on RUQ with small amount of purulent drainage - Dressing changed.   EXTREM: WWP, no edema, no calf tenderness  VASC: No cyanosis,   no pallor, palpable radial and pulses pedis 2+ bilaterally  SKIN: No rashes noted  PSYCH: Appropriate affect, answers questions appropriately      LABS:                        8.3    8.92  )-----------( 330      ( 06 Sep 2020 07:36 )             30.1     09-06    135  |  102  |  30<H>  ----------------------------<  203<H>  4.3   |  23  |  1.00    Ca    8.4      06 Sep 2020 07:36    TPro  5.9<L>  /  Alb  2.7<L>  /  TBili  0.4  /  DBili  x   /  AST  23  /  ALT  21  /  AlkPhos  137<H>  09-06

## 2020-09-06 NOTE — PROGRESS NOTE ADULT - SUBJECTIVE AND OBJECTIVE BOX
INTERVAL HPI/OVERNIGHT EVENTS:  Had HIDA scan this afternoon - he states that he did not complete full scan.  R knee pain is unchanged    CONSTITUTIONAL:  No fever, chills, night sweats  EYES:  No photophobia or visual changes  CARDIOVASCULAR:  No chest pain  RESPIRATORY:  No cough, wheezing, or SOB   GASTROINTESTINAL:  No nausea, vomiting, diarrhea, constipation, or abdominal pain  GENITOURINARY:  No frequency, urgency, dysuria or hematuria  NEUROLOGIC:  No headache, lightheadedness      ANTIBIOTICS/RELEVANT:  Daptomycin 675 mg IV q24h (9/6-present)  Cefepime 2 g IV q8h (9/6-present)  Rifampin 300 mg po q12h    Vancomycin    Vital Signs Last 24 Hrs  T(C): 36.4 (06 Sep 2020 17:15), Max: 37 (05 Sep 2020 21:30)  T(F): 97.6 (06 Sep 2020 17:15), Max: 98.6 (05 Sep 2020 21:30)  HR: 79 (06 Sep 2020 17:15) (75 - 91)  BP: 105/57 (06 Sep 2020 17:15) (92/49 - 108/61)  BP(mean): --  RR: 17 (06 Sep 2020 17:15) (16 - 17)  SpO2: 100% (06 Sep 2020 17:15) (94% - 100%)    PHYSICAL EXAM:  Constitutional:  Well-developed, well nourished, non-toxic  Eyes:  Sclerae anicteric, conjunctivae clear, PERRL  Ear/Nose/Throat:  No nasal exudate or sinus tenderness;  No buccal mucosal lesions, no pharyngeal erythema or exudate	  Neck:  Supple, no adenopathy  Respiratory:  Clear bilaterally  Cardiovascular:  AICD pocket without erythema, tenderness or warmth.  RRR, S1S2, no murmur appreciated  Gastrointestinal:  Symmetric, normoactive BS, soft, NT, no masses, guarding or rebound.  No HSM  Extremities:  No edema;  R hip incision dressed, R knee incision with eschar      LABS:                        8.3    8.92  )-----------( 330      ( 06 Sep 2020 07:36 )             30.1         09-06    135  |  102  |  30<H>  ----------------------------<  203<H>  4.3   |  23  |  1.00    Ca    8.4      06 Sep 2020 07:36    TPro  5.9<L>  /  Alb  2.7<L>  /  TBili  0.4  /  DBili  x   /  AST  23  /  ALT  21  /  AlkPhos  137<H>  09-06    Creatine Kinase, Serum: 152 U/L (09.06.20 @ 07:36)      MICROBIOLOGY:    Blood cultures:  9/1 X 2 - NG  Abd wall wound 9/3 - VRE. faecium and Pseudomonas aeruginosa    OR cultures R hip 9/4:  NGTD    Cultures from abd wall I&D 9/4 - VRE. faecium and Pseudomonas aeruginosa    RADIOLOGY & ADDITIONAL STUDIES:

## 2020-09-06 NOTE — PROGRESS NOTE ADULT - SUBJECTIVE AND OBJECTIVE BOX
Interval Events: Reviewed  Patient seen and examined at bedside.    Patient is a 65y old  Male who presents with a chief complaint of R Knee Swelling (06 Sep 2020 13:00)    he is doing OK  PAST MEDICAL & SURGICAL HISTORY:  Diabetic neuropathy  STEMI (ST elevation myocardial infarction)  Diverticulitis  MRSA bacteremia  History of celiac disease  CHF (congestive heart failure): EF ~ 25%  HTN (hypertension)  Diabetes: on insulin pump  Blood clot due to device, implant, or graft: was on blood thinners  HLD (hyperlipidemia)  Osteoarthritis  Atherosclerosis of coronary artery: CAD (coronary artery disease)  Status post percutaneous transluminal coronary angioplasty: in 2012  History of open reduction and internal fixation (ORIF) procedure  Surgery, elective: Right shoulder  Surgery, elective: right knee wound debridement  S/P TKR (total knee replacement), right: with infection Mrsa   per pt he was cleared from MRSA infection  S/P CABG x 1: 2018  Stented coronary artery: 10/18 heart attack  INFERIOR WALL MI  Other postprocedural status: Fixation hardware in foot LEFT      MEDICATIONS:  Pulmonary:    Antimicrobials:  cefepime   IVPB      cefepime   IVPB 2000 milliGRAM(s) IV Intermittent every 8 hours  DAPTOmycin IVPB 675 milliGRAM(s) IV Intermittent every 24 hours  rifAMPin 300 milliGRAM(s) Oral every 12 hours    Anticoagulants:  aspirin  chewable 81 milliGRAM(s) Oral two times a day  prasugrel 10 milliGRAM(s) Oral daily    Cardiac:  digoxin     Tablet 0.125 milliGRAM(s) Oral daily  metoprolol succinate ER 25 milliGRAM(s) Oral daily  spironolactone 25 milliGRAM(s) Oral daily  tamsulosin 0.4 milliGRAM(s) Oral at bedtime      Allergies    ACE inhibitors (Hives)  carvedilol (Other)  enalapril (Hives)  Entresto (Other)    Intolerances        Vital Signs Last 24 Hrs  T(C): 36.4 (06 Sep 2020 09:48), Max: 37 (05 Sep 2020 21:30)  T(F): 97.6 (06 Sep 2020 09:48), Max: 98.6 (05 Sep 2020 21:30)  HR: 84 (06 Sep 2020 12:22) (75 - 91)  BP: 106/61 (06 Sep 2020 12:22) (92/49 - 108/61)  BP(mean): --  RR: 16 (06 Sep 2020 09:48) (16 - 16)  SpO2: 94% (06 Sep 2020 09:48) (94% - 100%)    09-05 @ 07:01  -  09-06 @ 07:00  --------------------------------------------------------  IN: 150 mL / OUT: 1280 mL / NET: -1130 mL    09-06 @ 07:01  -  09-06 @ 16:41  --------------------------------------------------------  IN: 300 mL / OUT: 100 mL / NET: 200 mL          Review of Systems:   •	General: negative  •	Skin/Breast: negative  •	Ophthalmologic: negative  •	ENMT: negative  •	Respiratory and Thorax: negative  •	Cardiovascular: negative  •	Gastrointestinal: negative  •	Genitourinary: negative  •	Musculoskeletal: negative  •	Neurological: negative  •	Psychiatric: negative  •	Hematology/Lymphatics: negative  •	Endocrine: negative  •	Allergic/Immunologic: negative    Physical Exam:   • Constitutional:	Well-developed, well nourished  • Eyes:	EOMI; PERRL; no drainage or redness  • ENMT:	No oral lesions; no gross abnormalities  • Neck	No bruits; no thyromegaly or nodules  • Breasts:	not examined  • Back:	No deformity or limitation of movement  • Respiratory:	Breath Sounds equal & clear to percussion & auscultation, no accessory muscle use  • Cardiovascular:	Regular rate & rhythm, normal S1, S2; no murmurs, gallops or rubs; no S3, S4  • Gastrointestinal:	Soft, non-tender, no hepatosplenomegaly, normal bowel sounds  • Genitourinary:	not examined  • Rectal: not examined  • Extremities:	No cyanosis, clubbing or edema  • Vascular:	Equal and normal pulses (carotid, femoral, dorsalis pedis)  • Neurologica:l	not examined  • Skin:	No lesions; no rash  • Lymph Nodes:	No lymphadedenopathy  • Musculoskeletal:	No joint pain, swelling or deformity; no limitation of movement        LABS:      CBC Full  -  ( 06 Sep 2020 07:36 )  WBC Count : 8.92 K/uL  RBC Count : 4.21 M/uL  Hemoglobin : 8.3 g/dL  Hematocrit : 30.1 %  Platelet Count - Automated : 330 K/uL  Mean Cell Volume : 71.5 fl  Mean Cell Hemoglobin : 19.7 pg  Mean Cell Hemoglobin Concentration : 27.6 gm/dL  Auto Neutrophil # : 5.92 K/uL  Auto Lymphocyte # : 1.34 K/uL  Auto Monocyte # : 0.78 K/uL  Auto Eosinophil # : 0.79 K/uL  Auto Basophil # : 0.08 K/uL  Auto Neutrophil % : 66.4 %  Auto Lymphocyte % : 15.0 %  Auto Monocyte % : 8.8 %  Auto Eosinophil % : 8.9 %  Auto Basophil % : 0.9 %    09-06    135  |  102  |  30<H>  ----------------------------<  203<H>  4.3   |  23  |  1.00    Ca    8.4      06 Sep 2020 07:36    TPro  5.9<L>  /  Alb  2.7<L>  /  TBili  0.4  /  DBili  x   /  AST  23  /  ALT  21  /  AlkPhos  137<H>  09-06                    Culture Results:   Testing in progress (09-04 @ 22:31)  Culture Results:   Testing in progress (09-04 @ 22:31)  Culture Results:   Testing in progress (09-04 @ 22:31)  Culture Results:   Testing in progress (09-04 @ 22:31)  Culture Results:   Testing in progress (09-04 @ 22:31)  Culture Results:   Few Pseudomonas aeruginosa  Rare Enterococcus faecium (vancomycin resistant)  Result called to and read back byCuauhtemoc MAK RN  09/06/2020 10:05:10 (09-04 @ 19:06)  Culture Results:   Rare Pseudomonas aeruginosa  Rare Enterococcus faecium (vancomycin resistant)  Floor previously notified.  See previous culture susceptibility (09-04 @ 19:06)      RADIOLOGY & ADDITIONAL STUDIES (The following images were personally reviewed):  Loja:                                     No  Urine output:                       adequate  DVT prophylaxis:                 Yes  Flattus:                                  Yes  Bowel movement:              No

## 2020-09-06 NOTE — PROGRESS NOTE ADULT - SUBJECTIVE AND OBJECTIVE BOX
Subjective: Pt comfortable without complaints, notes some pain at the right hip and thigh which improves with pain medication. Denies CP, SOB, N/V, numbness/tingling. Abx changed yesterday       Vital Signs Last 24 Hrs  T(C): 36.8 (06 Sep 2020 05:53), Max: 37 (05 Sep 2020 21:30)  T(F): 98.2 (06 Sep 2020 05:53), Max: 98.6 (05 Sep 2020 21:30)  HR: 84 (06 Sep 2020 07:21) (75 - 96)  BP: 101/65 (06 Sep 2020 07:21) (92/49 - 108/61)  BP(mean): --  RR: 16 (06 Sep 2020 07:21) (16 - 16)  SpO2: 98% (06 Sep 2020 07:21) (94% - 100%)      General: Pt alert and oriented, NAD  RLE: R hip dressing c/d/i. Dressing in place over knee, wound assessed- proximal and distal incision closed w/o dehiscence or drainage. In central portion of incision wound with granulation tissue present, minimal amt fibrinous tissue w/o drainage or surrounding erythema. SILT L2-S1. EHL/FHL/TA/GS intact  LLE: vascular wound present, managed by plastic surg w/ WTD dressing. WTD dressing in place Feet [facility]        Labs:                        8.3    8.92  )-----------( 330      ( 06 Sep 2020 07:36 )             30.1   09-06    135  |  102  |  30<H>  ----------------------------<  203<H>  4.3   |  23  |  1.00    Ca    8.4      06 Sep 2020 07:36    TPro  5.9<L>  /  Alb  2.7<L>  /  TBili  0.4  /  DBili  x   /  AST  23  /  ALT  21  /  AlkPhos  137<H>  09-06        A/P: 65yMale POD#2 s/p Right hip VINOD  - Pain Control: IV and PO PRN   - DVT ppx: 81mg BID, effient   - PT, WBS: WBAT RLE   - F/u ID recs- currently on dapto/cefepime/rifampin  - Hip cultures still NGTD; only abd cultures + pseudomonas, enterococcus  - AM CMP wnl, CPK wnl  - F/u gen surg recs - plan to obtain HIDA scan    Ortho Pager [phone]

## 2020-09-06 NOTE — PROGRESS NOTE ADULT - ATTENDING COMMENTS
Seen by me this morning. Hip pain improved from prior, knee pain about status quo. Pseudomonas and VRE growing from the biliary tract. Antibiotics changed yesterday by ID. Will get HIDA and follow up general surgery recs. No immediate followup surgical plans for the RLE at this point; will continue trending markers to see how much of an effect the recent biliary work has had.

## 2020-09-06 NOTE — PROGRESS NOTE ADULT - PROBLEM SELECTOR PLAN 1
on broad spectrum antibiotics,   f/u intraop c/s  f/u vanco level and vanco was adjusted  I will discuss with ortho regarding the recent cultures from the right hip and is negative but the abdominal wound cultures are positive fro VREF and Pseudomonas.  He is on vanco and Rifampin  culture positive for MRSA with negative blood culture repeat but 4 bottles were positive on admission  JOHN PAUL unlikely endocarditis but might need to be treated as such.  The AICD bed is stable with no signs of infection  On Vanco and Rifampin, PICC line Monday.   hardware removed and follow on cultures which is negative to date  he is stable and he has area of necrosis over the wound and is followed by plastics  sepsis resolved and he is on antibiotic  I discussed with surgery and ID.  he is ddaptomycin and will follow with HIDA scan to rule out leakage from the GB

## 2020-09-06 NOTE — PROGRESS NOTE ADULT - ASSESSMENT
Assessment: 65M PMHx CAD s/p CABG, CHF, DM, HLD, HTN, acute cholecystitis s/p perc sudhir 2/28, recent R TKA 7/22 presents with R knee infection. General surgery consulted for purulent drainage near prior perc sudhir tube site.    Recommendations:  - HIDA scan to get a better idea of anatomy of the Hepatobiliary system (ordered after consultation and agreement with the Primary team over the phone)  - Dressing Changes  - FU on Final culture  - Surgery will continue to FU

## 2020-09-06 NOTE — PROGRESS NOTE ADULT - ASSESSMENT
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  He has completed 6 weeks of vancomycin & rifampin.  He is s/p R hip exchange of hardware on 9/4 - OR cultures NGTD.  I&D of abd wall done 9/4 - cultures growing Pseudomonas and Enterococcus faecium.  Unclear if he has persistent hole in GB resulting in fluid in fossa, absence of appearance of GB on CT scan or if limited to abd wall.  If related to GB, will need to add anaerobic coverage.  He is not toxic appearing and has very benign abd exam - will hold off for now.    Suggest:  - F/U HIDA scan  - F/U OR cultures from R hip  - Continue daptomycin 675 mg IV q24h.  Please check CPK qSaturday  - Continue cefepime 2 g IV q8h  - Continue rifampin 300 mg po q12h  Recommendations discussed with primary team and Dr. Kwok.  Will follow with you - team 1.

## 2020-09-06 NOTE — PROGRESS NOTE ADULT - ATTENDING COMMENTS
Patient seen and examined with house-staff during bedside rounds.  Resident note read, including vitals, physical findings, laboratory data, and radiological reports.   Revisions included below.  Direct personal management at bed side and extensive interpretation of the data.  Plan was outlined and discussed in details with the housestaff.  Decision making of high complexity  Action taken for acute disease activity to reflect the level of care provided:  - medication reconciliation  - review laboratory data  start ativan as needed  The

## 2020-09-06 NOTE — PROGRESS NOTE ADULT - PROBLEM SELECTOR PLAN 2
9 day s/p arthroscopic I and D, 7/17/2020.  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 right knee explant and antibiotic spacer.  Will follow with IID regarding the right hip cultures which is negative to date and does not look infected

## 2020-09-07 LAB
ALBUMIN SERPL ELPH-MCNC: 2.9 G/DL — LOW (ref 3.3–5)
ALP SERPL-CCNC: 137 U/L — HIGH (ref 40–120)
ALT FLD-CCNC: 18 U/L — SIGNIFICANT CHANGE UP (ref 10–45)
ANION GAP SERPL CALC-SCNC: 10 MMOL/L — SIGNIFICANT CHANGE UP (ref 5–17)
AST SERPL-CCNC: 19 U/L — SIGNIFICANT CHANGE UP (ref 10–40)
BILIRUB SERPL-MCNC: 0.5 MG/DL — SIGNIFICANT CHANGE UP (ref 0.2–1.2)
BUN SERPL-MCNC: 27 MG/DL — HIGH (ref 7–23)
CALCIUM SERPL-MCNC: 8.7 MG/DL — SIGNIFICANT CHANGE UP (ref 8.4–10.5)
CHLORIDE SERPL-SCNC: 102 MMOL/L — SIGNIFICANT CHANGE UP (ref 96–108)
CK SERPL-CCNC: 147 U/L — SIGNIFICANT CHANGE UP (ref 30–200)
CO2 SERPL-SCNC: 24 MMOL/L — SIGNIFICANT CHANGE UP (ref 22–31)
CREAT SERPL-MCNC: 0.97 MG/DL — SIGNIFICANT CHANGE UP (ref 0.5–1.3)
CRP SERPL-MCNC: 5.5 MG/DL — HIGH (ref 0–0.4)
ERYTHROCYTE [SEDIMENTATION RATE] IN BLOOD: 30 MM/HR — HIGH
GLUCOSE BLDC GLUCOMTR-MCNC: 184 MG/DL — HIGH (ref 70–99)
GLUCOSE BLDC GLUCOMTR-MCNC: 187 MG/DL — HIGH (ref 70–99)
GLUCOSE BLDC GLUCOMTR-MCNC: 215 MG/DL — HIGH (ref 70–99)
GLUCOSE BLDC GLUCOMTR-MCNC: 293 MG/DL — HIGH (ref 70–99)
GLUCOSE SERPL-MCNC: 158 MG/DL — HIGH (ref 70–99)
HCT VFR BLD CALC: 31.1 % — LOW (ref 39–50)
HGB BLD-MCNC: 8.6 G/DL — LOW (ref 13–17)
MCHC RBC-ENTMCNC: 19.7 PG — LOW (ref 27–34)
MCHC RBC-ENTMCNC: 27.7 GM/DL — LOW (ref 32–36)
MCV RBC AUTO: 71.3 FL — LOW (ref 80–100)
NRBC # BLD: 0 /100 WBCS — SIGNIFICANT CHANGE UP (ref 0–0)
PLATELET # BLD AUTO: 375 K/UL — SIGNIFICANT CHANGE UP (ref 150–400)
POTASSIUM SERPL-MCNC: 4 MMOL/L — SIGNIFICANT CHANGE UP (ref 3.5–5.3)
POTASSIUM SERPL-SCNC: 4 MMOL/L — SIGNIFICANT CHANGE UP (ref 3.5–5.3)
PROT SERPL-MCNC: 6.5 G/DL — SIGNIFICANT CHANGE UP (ref 6–8.3)
RBC # BLD: 4.36 M/UL — SIGNIFICANT CHANGE UP (ref 4.2–5.8)
RBC # FLD: 18.8 % — HIGH (ref 10.3–14.5)
SODIUM SERPL-SCNC: 136 MMOL/L — SIGNIFICANT CHANGE UP (ref 135–145)
WBC # BLD: 9.54 K/UL — SIGNIFICANT CHANGE UP (ref 3.8–10.5)
WBC # FLD AUTO: 9.54 K/UL — SIGNIFICANT CHANGE UP (ref 3.8–10.5)

## 2020-09-07 PROCEDURE — 99232 SBSQ HOSP IP/OBS MODERATE 35: CPT | Mod: GC

## 2020-09-07 PROCEDURE — 99232 SBSQ HOSP IP/OBS MODERATE 35: CPT

## 2020-09-07 RX ORDER — INSULIN LISPRO 100/ML
8 VIAL (ML) SUBCUTANEOUS
Refills: 0 | Status: DISCONTINUED | OUTPATIENT
Start: 2020-09-07 | End: 2020-09-18

## 2020-09-07 RX ORDER — DIPHENHYDRAMINE HCL 50 MG
50 CAPSULE ORAL EVERY 4 HOURS
Refills: 0 | Status: DISCONTINUED | OUTPATIENT
Start: 2020-09-07 | End: 2020-09-10

## 2020-09-07 RX ADMIN — Medication 81 MILLIGRAM(S): at 06:27

## 2020-09-07 RX ADMIN — HYDROMORPHONE HYDROCHLORIDE 0.5 MILLIGRAM(S): 2 INJECTION INTRAMUSCULAR; INTRAVENOUS; SUBCUTANEOUS at 07:06

## 2020-09-07 RX ADMIN — Medication 6 UNIT(S): at 17:44

## 2020-09-07 RX ADMIN — HYDROMORPHONE HYDROCHLORIDE 0.5 MILLIGRAM(S): 2 INJECTION INTRAMUSCULAR; INTRAVENOUS; SUBCUTANEOUS at 17:52

## 2020-09-07 RX ADMIN — Medication 650 MILLIGRAM(S): at 00:20

## 2020-09-07 RX ADMIN — ROSUVASTATIN CALCIUM 10 MILLIGRAM(S): 5 TABLET ORAL at 21:51

## 2020-09-07 RX ADMIN — GABAPENTIN 100 MILLIGRAM(S): 400 CAPSULE ORAL at 06:27

## 2020-09-07 RX ADMIN — Medication 0.12 MILLIGRAM(S): at 06:27

## 2020-09-07 RX ADMIN — Medication 6: at 13:11

## 2020-09-07 RX ADMIN — Medication 650 MILLIGRAM(S): at 17:20

## 2020-09-07 RX ADMIN — Medication 2: at 17:44

## 2020-09-07 RX ADMIN — Medication 50 MILLIGRAM(S): at 21:51

## 2020-09-07 RX ADMIN — HYDROMORPHONE HYDROCHLORIDE 0.5 MILLIGRAM(S): 2 INJECTION INTRAMUSCULAR; INTRAVENOUS; SUBCUTANEOUS at 00:46

## 2020-09-07 RX ADMIN — Medication 81 MILLIGRAM(S): at 17:20

## 2020-09-07 RX ADMIN — HYDROMORPHONE HYDROCHLORIDE 0.5 MILLIGRAM(S): 2 INJECTION INTRAMUSCULAR; INTRAVENOUS; SUBCUTANEOUS at 13:45

## 2020-09-07 RX ADMIN — POLYETHYLENE GLYCOL 3350 17 GRAM(S): 17 POWDER, FOR SOLUTION ORAL at 11:46

## 2020-09-07 RX ADMIN — GABAPENTIN 100 MILLIGRAM(S): 400 CAPSULE ORAL at 13:11

## 2020-09-07 RX ADMIN — HYDROMORPHONE HYDROCHLORIDE 0.5 MILLIGRAM(S): 2 INJECTION INTRAMUSCULAR; INTRAVENOUS; SUBCUTANEOUS at 17:27

## 2020-09-07 RX ADMIN — Medication 2: at 08:32

## 2020-09-07 RX ADMIN — CEFEPIME 100 MILLIGRAM(S): 1 INJECTION, POWDER, FOR SOLUTION INTRAMUSCULAR; INTRAVENOUS at 17:20

## 2020-09-07 RX ADMIN — Medication 25 MILLIGRAM(S): at 06:27

## 2020-09-07 RX ADMIN — PRASUGREL 10 MILLIGRAM(S): 5 TABLET, FILM COATED ORAL at 11:46

## 2020-09-07 RX ADMIN — Medication 50 MILLIGRAM(S): at 12:54

## 2020-09-07 RX ADMIN — CEFEPIME 100 MILLIGRAM(S): 1 INJECTION, POWDER, FOR SOLUTION INTRAMUSCULAR; INTRAVENOUS at 07:07

## 2020-09-07 RX ADMIN — HYDROMORPHONE HYDROCHLORIDE 0.5 MILLIGRAM(S): 2 INJECTION INTRAMUSCULAR; INTRAVENOUS; SUBCUTANEOUS at 07:45

## 2020-09-07 RX ADMIN — SPIRONOLACTONE 25 MILLIGRAM(S): 25 TABLET, FILM COATED ORAL at 06:28

## 2020-09-07 RX ADMIN — Medication 650 MILLIGRAM(S): at 11:46

## 2020-09-07 RX ADMIN — GABAPENTIN 100 MILLIGRAM(S): 400 CAPSULE ORAL at 21:51

## 2020-09-07 RX ADMIN — DAPTOMYCIN 127 MILLIGRAM(S): 500 INJECTION, POWDER, LYOPHILIZED, FOR SOLUTION INTRAVENOUS at 00:45

## 2020-09-07 RX ADMIN — Medication 650 MILLIGRAM(S): at 07:24

## 2020-09-07 RX ADMIN — Medication 6 UNIT(S): at 08:32

## 2020-09-07 RX ADMIN — INSULIN GLARGINE 16 UNIT(S): 100 INJECTION, SOLUTION SUBCUTANEOUS at 21:54

## 2020-09-07 RX ADMIN — HYDROMORPHONE HYDROCHLORIDE 0.5 MILLIGRAM(S): 2 INJECTION INTRAMUSCULAR; INTRAVENOUS; SUBCUTANEOUS at 01:40

## 2020-09-07 RX ADMIN — Medication 6 UNIT(S): at 13:11

## 2020-09-07 RX ADMIN — HYDROMORPHONE HYDROCHLORIDE 0.5 MILLIGRAM(S): 2 INJECTION INTRAMUSCULAR; INTRAVENOUS; SUBCUTANEOUS at 13:17

## 2020-09-07 RX ADMIN — Medication 650 MILLIGRAM(S): at 06:27

## 2020-09-07 NOTE — PROGRESS NOTE ADULT - ASSESSMENT
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  He has completed 6 weeks of vancomycin & rifampin.  He is s/p R hip exchange of hardware on 9/4 - OR cultures NGTD.  I&D of abd wall done 9/4 - cultures growing Pseudomonas and Enterococcus faecium.  Unclear if he has persistent hole in GB resulting in fluid in fossa, absence of appearance of GB on CT scan or if limited to abd wall.  If related to GB, will need to add anaerobic coverage.  He is not toxic appearing and has very benign abd exam - will hold off for now.    Suggest:  - F/U HIDA scan  - F/U OR cultures from R hip  - Continue daptomycin 675 mg IV q24h.  Please check CPK qSaturday  - Continue cefepime 2 g IV q8h  - Continue rifampin 300 mg po q12h  Will follow with you - team 1.

## 2020-09-07 NOTE — PROGRESS NOTE ADULT - SUBJECTIVE AND OBJECTIVE BOX
HX: POD3 s/p I&D at site of previous perc. sudhir     SUBJECTIVE: Patient seen and examined bedside by resident. Patient denies abdominal pain or persistent drainage from the abscess site. Denies N/V. Bandage changed at bedside and patient notes decreased purulence and induration from the area.     Vital Signs Last 24 Hrs  T(C): 36.5 (07 Sep 2020 06:11), Max: 36.7 (06 Sep 2020 20:44)  T(F): 97.7 (07 Sep 2020 06:11), Max: 98.1 (06 Sep 2020 20:44)  HR: 90 (07 Sep 2020 06:11) (79 - 90)  BP: 110/54 (07 Sep 2020 06:11) (105/57 - 119/58)  BP(mean): 78 (07 Sep 2020 06:11) (78 - 83)  RR: 18 (07 Sep 2020 06:11) (17 - 18)  SpO2: 94% (07 Sep 2020 06:11) (94% - 100%)    General: NAD, resting comfortably in bed, chatty and working on his computer   C/V: pulses present in b/l upper and lower extremities   Pulm: Nonlabored breathing, no respiratory distress  Abd: soft, ND, NT, no rebound or guarding, incision site with minimal erythema, minimal induration, unable to express fluid/purulent material, dressings mostly clean and dry with some minimal yellow purulent fluid and residual packing  Extrem: WWP, no edema, no SCDs, b/l lower extremity bandages     LABS:                        8.6    9.54  )-----------( 375      ( 07 Sep 2020 07:43 )             31.1     09-07    136  |  102  |  27<H>  ----------------------------<  158<H>  4.0   |  24  |  0.97    Ca    8.7      07 Sep 2020 07:42    TPro  6.5  /  Alb  2.9<L>  /  TBili  0.5  /  DBili  x   /  AST  19  /  ALT  18  /  AlkPhos  137<H>  09-07

## 2020-09-07 NOTE — PROGRESS NOTE ADULT - SUBJECTIVE AND OBJECTIVE BOX
INTERVAL HPI/OVERNIGHT EVENTS:  No acute events    CONSTITUTIONAL:  No fever, chills, night sweats  EYES:  No photophobia or visual changes  CARDIOVASCULAR:  No chest pain  RESPIRATORY:  No cough, wheezing, or SOB   GASTROINTESTINAL:  No nausea, vomiting, diarrhea, constipation, or abdominal pain  GENITOURINARY:  No frequency, urgency, dysuria or hematuria  NEUROLOGIC:  No headache, lightheadedness      ANTIBIOTICS/RELEVANT:  Daptomycin 675 mg IV q24h (9/6-present)  Cefepime 2 g IV q8h (9/6-present)  Rifampin 300 mg po q12h    Vancomycin          Vital Signs Last 24 Hrs  T(C): 36.6 (07 Sep 2020 16:46), Max: 36.7 (06 Sep 2020 20:44)  T(F): 97.8 (07 Sep 2020 16:46), Max: 98.1 (06 Sep 2020 20:44)  HR: 77 (07 Sep 2020 16:46) (77 - 90)  BP: 91/52 (07 Sep 2020 16:46) (91/52 - 119/58)  BP(mean): 78 (07 Sep 2020 06:11) (78 - 83)  RR: 18 (07 Sep 2020 16:46) (18 - 18)  SpO2: 100% (07 Sep 2020 16:46) (94% - 100%)    PHYSICAL EXAM:  Constitutional:  Well-developed, well nourished, non-toxic  Eyes:  Sclerae anicteric, conjunctivae clear, PERRL  Ear/Nose/Throat:  No nasal exudate or sinus tenderness;  No buccal mucosal lesions, no pharyngeal erythema or exudate	  Neck:  Supple, no adenopathy  Respiratory:  Clear bilaterally  Cardiovascular:  AICD pocket without erythema, tenderness or warmth.  RRR, S1S2, no murmur appreciated  Gastrointestinal:  Symmetric, normoactive BS, soft, NT, no masses, guarding or rebound.  No HSM.  I&D site dressed by surgery  Extremities:  No edema;  R hip incision dressed, R knee incision with eschar    LABS:                        8.6    9.54  )-----------( 375      ( 07 Sep 2020 07:43 )             31.1         09-07    136  |  102  |  27<H>  ----------------------------<  158<H>  4.0   |  24  |  0.97    Ca    8.7      07 Sep 2020 07:42    TPro  6.5  /  Alb  2.9<L>  /  TBili  0.5  /  DBili  x   /  AST  19  /  ALT  18  /  AlkPhos  137<H>  09-07          MICROBIOLOGY:  Blood cultures:  9/1 X 2 - NG  Abd wall wound 9/3 - VRE. faecium and Pseudomonas aeruginosa    OR cultures R hip 9/4:  NGTD    Cultures from abd wall I&D 9/4 - VRE. faecium and Pseudomonas aeruginosa        RADIOLOGY & ADDITIONAL STUDIES:

## 2020-09-07 NOTE — PROGRESS NOTE ADULT - ATTENDING COMMENTS
HIDA scan results still pending. Will follow up GSurg recs once some results are available (though he admits he did not complete the study). Will consult Wound Care and Plastics tomorrow for ongoing management of the bilateral leg wounds - patient has indicated that he does not wish to continue treatment with Dr. Bowen. Maintain current antibiotics for now. Hip bone and implant cultures still negative, inflammatory markers have been up and down. Potentially for right knee antibiotic cement spacer exchange +/- medial gastroc flap within 1-2 weeks pending clinical progress.

## 2020-09-07 NOTE — PROGRESS NOTE ADULT - SUBJECTIVE AND OBJECTIVE BOX
Interval Events: Reviewed  Patient seen and examined at bedside.    Patient is a 65y old  Male who presents with a chief complaint of R Knee Swelling (07 Sep 2020 13:16)    he is doign OK  PAST MEDICAL & SURGICAL HISTORY:  Diabetic neuropathy  STEMI (ST elevation myocardial infarction)  Diverticulitis  MRSA bacteremia  History of celiac disease  CHF (congestive heart failure): EF ~ 25%  HTN (hypertension)  Diabetes: on insulin pump  Blood clot due to device, implant, or graft: was on blood thinners  HLD (hyperlipidemia)  Osteoarthritis  Atherosclerosis of coronary artery: CAD (coronary artery disease)  Status post percutaneous transluminal coronary angioplasty: in 2012  History of open reduction and internal fixation (ORIF) procedure  Surgery, elective: Right shoulder  Surgery, elective: right knee wound debridement  S/P TKR (total knee replacement), right: with infection Mrsa   per pt he was cleared from MRSA infection  S/P CABG x 1: 2018  Stented coronary artery: 10/18 heart attack  INFERIOR WALL MI  Other postprocedural status: Fixation hardware in foot LEFT      MEDICATIONS:  Pulmonary:    Antimicrobials:  cefepime   IVPB      cefepime   IVPB 2000 milliGRAM(s) IV Intermittent every 8 hours  DAPTOmycin IVPB 675 milliGRAM(s) IV Intermittent every 24 hours  rifAMPin 300 milliGRAM(s) Oral every 12 hours    Anticoagulants:  aspirin  chewable 81 milliGRAM(s) Oral two times a day  prasugrel 10 milliGRAM(s) Oral daily    Cardiac:  digoxin     Tablet 0.125 milliGRAM(s) Oral daily  metoprolol succinate ER 25 milliGRAM(s) Oral daily  spironolactone 25 milliGRAM(s) Oral daily  tamsulosin 0.4 milliGRAM(s) Oral at bedtime      Allergies    ACE inhibitors (Hives)  carvedilol (Other)  enalapril (Hives)  Entresto (Other)    Intolerances        Vital Signs Last 24 Hrs  T(C): 36.6 (07 Sep 2020 16:46), Max: 36.7 (06 Sep 2020 20:44)  T(F): 97.8 (07 Sep 2020 16:46), Max: 98.1 (06 Sep 2020 20:44)  HR: 77 (07 Sep 2020 16:46) (77 - 90)  BP: 91/52 (07 Sep 2020 16:46) (91/52 - 119/58)  BP(mean): 78 (07 Sep 2020 06:11) (78 - 83)  RR: 18 (07 Sep 2020 16:46) (18 - 18)  SpO2: 100% (07 Sep 2020 16:46) (94% - 100%)    09-06 @ 07:01  -  09-07 @ 07:00  --------------------------------------------------------  IN: 300 mL / OUT: 300 mL / NET: 0 mL          Review of Systems:   •	General: negative  •	Skin/Breast: negative  •	Ophthalmologic: negative  •	ENMT: negative  •	Respiratory and Thorax: negative  •	Cardiovascular: negative  •	Gastrointestinal: negative  •	Genitourinary: negative  •	Musculoskeletal: negative  •	Neurological: negative  •	Psychiatric: negative  •	Hematology/Lymphatics: negative  •	Endocrine: negative  •	Allergic/Immunologic: negative    Physical Exam:   • Constitutional:	Well-developed, well nourished  • Eyes:	EOMI; PERRL; no drainage or redness  • ENMT:	No oral lesions; no gross abnormalities  • Neck	No bruits; no thyromegaly or nodules  • Breasts:	not examined  • Back:	No deformity or limitation of movement  • Respiratory:	Breath Sounds equal & clear to percussion & auscultation, no accessory muscle use  • Cardiovascular:	Regular rate & rhythm, normal S1, S2; no murmurs, gallops or rubs; no S3, S4  • Gastrointestinal:	Soft, non-tender, no hepatosplenomegaly, normal bowel sounds  • Genitourinary:	not examined  • Rectal: not examined  • Extremities:	No cyanosis, clubbing or edema  • Vascular:	Equal and normal pulses (carotid, femoral, dorsalis pedis)  • Neurologica:l	not examined  • Skin:	No lesions; no rash  • Lymph Nodes:	No lymphadedenopathy  • Musculoskeletal:	No joint pain, swelling or deformity; no limitation of movement        LABS:      CBC Full  -  ( 07 Sep 2020 07:43 )  WBC Count : 9.54 K/uL  RBC Count : 4.36 M/uL  Hemoglobin : 8.6 g/dL  Hematocrit : 31.1 %  Platelet Count - Automated : 375 K/uL  Mean Cell Volume : 71.3 fl  Mean Cell Hemoglobin : 19.7 pg  Mean Cell Hemoglobin Concentration : 27.7 gm/dL  Auto Neutrophil # : x  Auto Lymphocyte # : x  Auto Monocyte # : x  Auto Eosinophil # : x  Auto Basophil # : x  Auto Neutrophil % : x  Auto Lymphocyte % : x  Auto Monocyte % : x  Auto Eosinophil % : x  Auto Basophil % : x    09-07    136  |  102  |  27<H>  ----------------------------<  158<H>  4.0   |  24  |  0.97    Ca    8.7      07 Sep 2020 07:42    TPro  6.5  /  Alb  2.9<L>  /  TBili  0.5  /  DBili  x   /  AST  19  /  ALT  18  /  AlkPhos  137<H>  09-07                        RADIOLOGY & ADDITIONAL STUDIES (The following images were personally reviewed):  Loja:                                     No  Urine output:                       adequate  DVT prophylaxis:                 Yes  Flattus:                                  Yes  Bowel movement:              No

## 2020-09-07 NOTE — PROGRESS NOTE ADULT - ASSESSMENT
Assessment: 65M PMHx CAD s/p CABG, CHF, DM, HLD, HTN, acute cholecystitis s/p perc sudhir 2/28, recent R TKA 7/22 presents with R knee infection. General surgery consulted for purulent drainage near prior perc sudhir tube site. POD3 s/p bedside I&D at perc sudhir site (9/4) with improvement in erythema, induration and purulent expression. WBCs wnl, afebrile, CRP downtrending.     Recommendations:  - f/u final read HIDA scan  - change guaze and tape daily, monitor for increased induration or purulent expression   - f/u cultures  - monitor WBCs and fevers   - Team 4c will continue to follow     Plan discussed with attending and chief resident.   ____________________________________________________  Mely Lu MD     PGY1 - Surgery Team 4

## 2020-09-07 NOTE — PROGRESS NOTE ADULT - SUBJECTIVE AND OBJECTIVE BOX
Subjective: Pt comfortable without complaints, Denies CP, SOB, N/V, numbness/tingling. Went for HIDA scan yesterday.      Vital Signs Last 24 Hrs  T(C): 36.5 (07 Sep 2020 06:11), Max: 36.7 (06 Sep 2020 20:44)  T(F): 97.7 (07 Sep 2020 06:11), Max: 98.1 (06 Sep 2020 20:44)  HR: 90 (07 Sep 2020 06:11) (75 - 90)  BP: 110/54 (07 Sep 2020 06:11) (100/55 - 119/58)  BP(mean): 78 (07 Sep 2020 06:11) (78 - 83)  RR: 18 (07 Sep 2020 06:11) (16 - 18)  SpO2: 94% (07 Sep 2020 06:11) (94% - 100%)      General: Pt alert and oriented, NAD  RLE: R hip dressing c/d/i. Dressing in place over knee, wound assessed- 5mm wound at proximal aspect of incision which pt states has been there >1y. Proximal and distal extents of incision closed w/o dehiscence or drainage. In central portion of incision wound with granulation tissue present, minimal amount fibrinous tissue w/o drainage or surrounding erythema. SILT L2-S1. EHL/FHL/TA/GS intact  LLE: vascular wound present, managed by plastic surg w/ WTD dressing. WTD dressing in place. Feet WWP        Labs:                  A/P: 65yMale POD#3 s/p Right hip VINOD 9/4  - Pain Control: IV and PO PRN   - DVT ppx: 81mg BID, effient   - PT, WBS: WBAT RLE   - F/u ID recs- currently on dapto/cefepime/rifampin  - Hip cultures still NGTD; only abd cultures + pseudomonas, enterococcus  - F/u gen surg recs; HIDA scan done       Ortho Pager [phone]

## 2020-09-07 NOTE — PROGRESS NOTE ADULT - ATTENDING COMMENTS
Patient seen and examined with house-staff during bedside rounds.  Resident note read, including vitals, physical findings, laboratory data, and radiological reports.   Revisions included below.  Direct personal management at bed side and extensive interpretation of the data.  Plan was outlined and discussed in details with the housestaff.  Decision making of high complexity  Action taken for acute disease activity to reflect the level of care provided:  - medication reconciliation  - review laboratory data  await results of the HIDA scan

## 2020-09-08 DIAGNOSIS — F43.23 ADJUSTMENT DISORDER WITH MIXED ANXIETY AND DEPRESSED MOOD: ICD-10-CM

## 2020-09-08 DIAGNOSIS — R41.9 UNSPECIFIED SYMPTOMS AND SIGNS INVOLVING COGNITIVE FUNCTIONS AND AWARENESS: ICD-10-CM

## 2020-09-08 LAB
ANION GAP SERPL CALC-SCNC: 8 MMOL/L — SIGNIFICANT CHANGE UP (ref 5–17)
BUN SERPL-MCNC: 27 MG/DL — HIGH (ref 7–23)
CALCIUM SERPL-MCNC: 8.4 MG/DL — SIGNIFICANT CHANGE UP (ref 8.4–10.5)
CHLORIDE SERPL-SCNC: 104 MMOL/L — SIGNIFICANT CHANGE UP (ref 96–108)
CO2 SERPL-SCNC: 22 MMOL/L — SIGNIFICANT CHANGE UP (ref 22–31)
CREAT SERPL-MCNC: 0.85 MG/DL — SIGNIFICANT CHANGE UP (ref 0.5–1.3)
CRP SERPL-MCNC: 3.99 MG/DL — HIGH (ref 0–0.4)
CULTURE RESULTS: NO GROWTH — SIGNIFICANT CHANGE UP
ERYTHROCYTE [SEDIMENTATION RATE] IN BLOOD: 25 MM/HR — HIGH
GLUCOSE BLDC GLUCOMTR-MCNC: 173 MG/DL — HIGH (ref 70–99)
GLUCOSE BLDC GLUCOMTR-MCNC: 203 MG/DL — HIGH (ref 70–99)
GLUCOSE BLDC GLUCOMTR-MCNC: 222 MG/DL — HIGH (ref 70–99)
GLUCOSE BLDC GLUCOMTR-MCNC: 223 MG/DL — HIGH (ref 70–99)
GLUCOSE SERPL-MCNC: 242 MG/DL — HIGH (ref 70–99)
HCT VFR BLD CALC: 30 % — LOW (ref 39–50)
HGB BLD-MCNC: 8.4 G/DL — LOW (ref 13–17)
MCHC RBC-ENTMCNC: 19.9 PG — LOW (ref 27–34)
MCHC RBC-ENTMCNC: 28 GM/DL — LOW (ref 32–36)
MCV RBC AUTO: 71.1 FL — LOW (ref 80–100)
NRBC # BLD: 0 /100 WBCS — SIGNIFICANT CHANGE UP (ref 0–0)
PLATELET # BLD AUTO: 378 K/UL — SIGNIFICANT CHANGE UP (ref 150–400)
POTASSIUM SERPL-MCNC: 4.9 MMOL/L — SIGNIFICANT CHANGE UP (ref 3.5–5.3)
POTASSIUM SERPL-SCNC: 4.9 MMOL/L — SIGNIFICANT CHANGE UP (ref 3.5–5.3)
RBC # BLD: 4.22 M/UL — SIGNIFICANT CHANGE UP (ref 4.2–5.8)
RBC # FLD: 19 % — HIGH (ref 10.3–14.5)
SODIUM SERPL-SCNC: 134 MMOL/L — LOW (ref 135–145)
SPECIMEN SOURCE: SIGNIFICANT CHANGE UP
WBC # BLD: 9.76 K/UL — SIGNIFICANT CHANGE UP (ref 3.8–10.5)
WBC # FLD AUTO: 9.76 K/UL — SIGNIFICANT CHANGE UP (ref 3.8–10.5)

## 2020-09-08 PROCEDURE — 99232 SBSQ HOSP IP/OBS MODERATE 35: CPT | Mod: GC

## 2020-09-08 PROCEDURE — 90792 PSYCH DIAG EVAL W/MED SRVCS: CPT

## 2020-09-08 PROCEDURE — 99232 SBSQ HOSP IP/OBS MODERATE 35: CPT

## 2020-09-08 RX ORDER — MORPHINE SULFATE 50 MG/1
15 CAPSULE, EXTENDED RELEASE ORAL EVERY 4 HOURS
Refills: 0 | Status: DISCONTINUED | OUTPATIENT
Start: 2020-09-08 | End: 2020-09-14

## 2020-09-08 RX ORDER — MORPHINE SULFATE 50 MG/1
30 CAPSULE, EXTENDED RELEASE ORAL EVERY 4 HOURS
Refills: 0 | Status: DISCONTINUED | OUTPATIENT
Start: 2020-09-08 | End: 2020-09-14

## 2020-09-08 RX ORDER — INSULIN GLARGINE 100 [IU]/ML
20 INJECTION, SOLUTION SUBCUTANEOUS AT BEDTIME
Refills: 0 | Status: DISCONTINUED | OUTPATIENT
Start: 2020-09-08 | End: 2020-09-10

## 2020-09-08 RX ORDER — HYDROMORPHONE HYDROCHLORIDE 2 MG/ML
2 INJECTION INTRAMUSCULAR; INTRAVENOUS; SUBCUTANEOUS EVERY 4 HOURS
Refills: 0 | Status: DISCONTINUED | OUTPATIENT
Start: 2020-09-08 | End: 2020-09-08

## 2020-09-08 RX ORDER — MORPHINE SULFATE 50 MG/1
2 CAPSULE, EXTENDED RELEASE ORAL EVERY 4 HOURS
Refills: 0 | Status: DISCONTINUED | OUTPATIENT
Start: 2020-09-08 | End: 2020-09-14

## 2020-09-08 RX ORDER — HYDROMORPHONE HYDROCHLORIDE 2 MG/ML
4 INJECTION INTRAMUSCULAR; INTRAVENOUS; SUBCUTANEOUS EVERY 4 HOURS
Refills: 0 | Status: DISCONTINUED | OUTPATIENT
Start: 2020-09-08 | End: 2020-09-08

## 2020-09-08 RX ADMIN — Medication 650 MILLIGRAM(S): at 07:26

## 2020-09-08 RX ADMIN — SPIRONOLACTONE 25 MILLIGRAM(S): 25 TABLET, FILM COATED ORAL at 06:55

## 2020-09-08 RX ADMIN — Medication 50 MILLIGRAM(S): at 17:01

## 2020-09-08 RX ADMIN — DAPTOMYCIN 127 MILLIGRAM(S): 500 INJECTION, POWDER, LYOPHILIZED, FOR SOLUTION INTRAVENOUS at 02:01

## 2020-09-08 RX ADMIN — Medication 50 MILLIGRAM(S): at 08:00

## 2020-09-08 RX ADMIN — HYDROMORPHONE HYDROCHLORIDE 0.5 MILLIGRAM(S): 2 INJECTION INTRAMUSCULAR; INTRAVENOUS; SUBCUTANEOUS at 11:18

## 2020-09-08 RX ADMIN — PRASUGREL 10 MILLIGRAM(S): 5 TABLET, FILM COATED ORAL at 12:19

## 2020-09-08 RX ADMIN — Medication 2: at 22:34

## 2020-09-08 RX ADMIN — Medication 8 UNIT(S): at 12:19

## 2020-09-08 RX ADMIN — Medication 650 MILLIGRAM(S): at 06:55

## 2020-09-08 RX ADMIN — Medication 650 MILLIGRAM(S): at 13:29

## 2020-09-08 RX ADMIN — Medication 81 MILLIGRAM(S): at 06:55

## 2020-09-08 RX ADMIN — Medication 4: at 08:57

## 2020-09-08 RX ADMIN — POLYETHYLENE GLYCOL 3350 17 GRAM(S): 17 POWDER, FOR SOLUTION ORAL at 12:19

## 2020-09-08 RX ADMIN — HYDROMORPHONE HYDROCHLORIDE 0.5 MILLIGRAM(S): 2 INJECTION INTRAMUSCULAR; INTRAVENOUS; SUBCUTANEOUS at 02:00

## 2020-09-08 RX ADMIN — CEFEPIME 100 MILLIGRAM(S): 1 INJECTION, POWDER, FOR SOLUTION INTRAMUSCULAR; INTRAVENOUS at 07:25

## 2020-09-08 RX ADMIN — Medication 650 MILLIGRAM(S): at 18:16

## 2020-09-08 RX ADMIN — Medication 650 MILLIGRAM(S): at 12:18

## 2020-09-08 RX ADMIN — MORPHINE SULFATE 30 MILLIGRAM(S): 50 CAPSULE, EXTENDED RELEASE ORAL at 14:48

## 2020-09-08 RX ADMIN — HYDROMORPHONE HYDROCHLORIDE 0.5 MILLIGRAM(S): 2 INJECTION INTRAMUSCULAR; INTRAVENOUS; SUBCUTANEOUS at 02:15

## 2020-09-08 RX ADMIN — Medication 4: at 18:13

## 2020-09-08 RX ADMIN — HYDROMORPHONE HYDROCHLORIDE 0.5 MILLIGRAM(S): 2 INJECTION INTRAMUSCULAR; INTRAVENOUS; SUBCUTANEOUS at 10:01

## 2020-09-08 RX ADMIN — MORPHINE SULFATE 2 MILLIGRAM(S): 50 CAPSULE, EXTENDED RELEASE ORAL at 18:16

## 2020-09-08 RX ADMIN — MORPHINE SULFATE 2 MILLIGRAM(S): 50 CAPSULE, EXTENDED RELEASE ORAL at 17:11

## 2020-09-08 RX ADMIN — INSULIN GLARGINE 20 UNIT(S): 100 INJECTION, SOLUTION SUBCUTANEOUS at 22:34

## 2020-09-08 RX ADMIN — MORPHINE SULFATE 30 MILLIGRAM(S): 50 CAPSULE, EXTENDED RELEASE ORAL at 16:15

## 2020-09-08 RX ADMIN — GABAPENTIN 100 MILLIGRAM(S): 400 CAPSULE ORAL at 06:55

## 2020-09-08 RX ADMIN — Medication 25 MILLIGRAM(S): at 06:55

## 2020-09-08 RX ADMIN — Medication 5 MILLIGRAM(S): at 17:02

## 2020-09-08 RX ADMIN — CEFEPIME 100 MILLIGRAM(S): 1 INJECTION, POWDER, FOR SOLUTION INTRAMUSCULAR; INTRAVENOUS at 00:52

## 2020-09-08 RX ADMIN — Medication 0.12 MILLIGRAM(S): at 06:55

## 2020-09-08 RX ADMIN — GABAPENTIN 100 MILLIGRAM(S): 400 CAPSULE ORAL at 14:48

## 2020-09-08 RX ADMIN — Medication 8 UNIT(S): at 18:14

## 2020-09-08 RX ADMIN — ROSUVASTATIN CALCIUM 10 MILLIGRAM(S): 5 TABLET ORAL at 22:34

## 2020-09-08 RX ADMIN — TAMSULOSIN HYDROCHLORIDE 0.4 MILLIGRAM(S): 0.4 CAPSULE ORAL at 22:33

## 2020-09-08 RX ADMIN — Medication 650 MILLIGRAM(S): at 17:02

## 2020-09-08 RX ADMIN — Medication 4: at 12:19

## 2020-09-08 RX ADMIN — Medication 81 MILLIGRAM(S): at 17:01

## 2020-09-08 RX ADMIN — Medication 8 UNIT(S): at 08:58

## 2020-09-08 RX ADMIN — CEFEPIME 100 MILLIGRAM(S): 1 INJECTION, POWDER, FOR SOLUTION INTRAMUSCULAR; INTRAVENOUS at 17:02

## 2020-09-08 RX ADMIN — GABAPENTIN 100 MILLIGRAM(S): 400 CAPSULE ORAL at 22:34

## 2020-09-08 RX ADMIN — Medication 650 MILLIGRAM(S): at 01:15

## 2020-09-08 RX ADMIN — Medication 650 MILLIGRAM(S): at 00:52

## 2020-09-08 NOTE — PROGRESS NOTE ADULT - ATTENDING COMMENTS
I have reviewed the medical record, including laboratory and radiographic studies, interviewed and examined the patient and discussed the plan with Dr. Noel, the ID Resident.  Agree with above. Discussed with Dr. Uribe (Radiology) - cannot exclude leak from GB based upon HIDA.  Will do total body gallium with SPECT-CT RUQ.  Will continue to follow with you – ID Team 1.

## 2020-09-08 NOTE — BEHAVIORAL HEALTH ASSESSMENT NOTE - PAST PSYCHOTROPIC MEDICATION
reports past treatment for anxiety with Paxil, switched to Cymbalta for additional treatment of DM neuropathy

## 2020-09-08 NOTE — PROGRESS NOTE ADULT - ASSESSMENT
INCOMPLETE NOTE     64 yo M with HTN, hyperlipidemia, type II DM with peripheral neuropathy, CAD s/p MIDCAB (2018)/VERONICA, HFrEF, AICD (2/20), pulmonary HTN, chronic cholecystitis with recurrent R knee PPJI - MRSA, likely persistent from 11/2019.  He has completed 6 weeks of vancomycin & rifampin.  He is s/p R hip exchange of hardware on 9/4 - OR cultures NGTD.  I&D of abd wall done 9/4 - cultures growing Pseudomonas and Enterococcus faecium.  Unclear if he has persistent hole in GB resulting in fluid in fossa, absence of appearance of GB on CT scan or if limited to abd wall.  If related to GB, will need to add anaerobic coverage.  He is not toxic appearing and has very benign abd exam - will hold off for now.    Suggest:  - F/U HIDA scan (no official report yet)  - F/U OR cultures from R hip  - Continue daptomycin 675 mg IV q24h.  Please check CPK qSaturday  - Continue cefepime 2 g IV q8h  - Continue rifampin 300 mg po q12h  Will follow with you - team 1. 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  He has completed 6 weeks of vancomycin & rifampin.  He is s/p R hip exchange of hardware on 9/4 - OR cultures NGTD.  I&D of abd wall done 9/4 - cultures growing Pseudomonas and Enterococcus faecium.  Unclear if he has persistent hole in GB resulting in fluid in fossa, absence of appearance of GB on CT scan or if limited to abd wall.  If related to GB, will need to add anaerobic coverage.  He is not toxic appearing and has very benign abd exam - will hold off for now.  HIDA scan today unrevealing, therefore will get Gallium scan.      Suggest:  - F/U OR cultures from R hip  - Continue daptomycin 675 mg IV q24h.  Please check CPK qSaturday  - Continue cefepime 2 g IV q8h  - Continue rifampin 300 mg po q12h  - obtain gallium scan    Will follow with you - team 1.

## 2020-09-08 NOTE — PROGRESS NOTE ADULT - SUBJECTIVE AND OBJECTIVE BOX
Interval Events: Reviewed  Patient seen and examined at bedside.    Patient is a 65y old  Male who presents with a chief complaint of R Knee Swelling (08 Sep 2020 13:36)  he is doing OK    PAST MEDICAL & SURGICAL HISTORY:  Diabetic neuropathy  STEMI (ST elevation myocardial infarction)  Diverticulitis  MRSA bacteremia  History of celiac disease  CHF (congestive heart failure): EF ~ 25%  HTN (hypertension)  Diabetes: on insulin pump  Blood clot due to device, implant, or graft: was on blood thinners  HLD (hyperlipidemia)  Osteoarthritis  Atherosclerosis of coronary artery: CAD (coronary artery disease)  Status post percutaneous transluminal coronary angioplasty: in 2012  History of open reduction and internal fixation (ORIF) procedure  Surgery, elective: Right shoulder  Surgery, elective: right knee wound debridement  S/P TKR (total knee replacement), right: with infection Mrsa   per pt he was cleared from MRSA infection  S/P CABG x 1: 2018  Stented coronary artery: 10/18 heart attack  INFERIOR WALL MI  Other postprocedural status: Fixation hardware in foot LEFT      MEDICATIONS:  Pulmonary:    Antimicrobials:  cefepime   IVPB      cefepime   IVPB 2000 milliGRAM(s) IV Intermittent every 8 hours  DAPTOmycin IVPB 675 milliGRAM(s) IV Intermittent every 24 hours  rifAMPin 300 milliGRAM(s) Oral every 12 hours    Anticoagulants:  aspirin  chewable 81 milliGRAM(s) Oral two times a day  prasugrel 10 milliGRAM(s) Oral daily    Cardiac:  digoxin     Tablet 0.125 milliGRAM(s) Oral daily  metoprolol succinate ER 25 milliGRAM(s) Oral daily  spironolactone 25 milliGRAM(s) Oral daily  tamsulosin 0.4 milliGRAM(s) Oral at bedtime      Allergies    ACE inhibitors (Hives)  carvedilol (Other)  enalapril (Hives)  Entresto (Other)    Intolerances        Vital Signs Last 24 Hrs  T(C): 36.6 (08 Sep 2020 17:15), Max: 37 (08 Sep 2020 08:47)  T(F): 97.8 (08 Sep 2020 17:15), Max: 98.6 (08 Sep 2020 08:47)  HR: 80 (08 Sep 2020 17:15) (80 - 93)  BP: 104/59 (08 Sep 2020 17:15) (104/53 - 120/70)  BP(mean): --  RR: 17 (08 Sep 2020 17:15) (17 - 20)  SpO2: 100% (08 Sep 2020 17:15) (99% - 100%)    09-07 @ 07:01  -  09-08 @ 07:00  --------------------------------------------------------  IN: 150 mL / OUT: 1200 mL / NET: -1050 mL          Review of Systems:   •	General: negative  •	Skin/Breast: negative  •	Ophthalmologic: negative  •	ENMT: negative  •	Respiratory and Thorax: negative  •	Cardiovascular: negative  •	Gastrointestinal: negative  •	Genitourinary: negative  •	Musculoskeletal: negative  •	Neurological: negative  •	Psychiatric: negative  •	Hematology/Lymphatics: negative  •	Endocrine: negative  •	Allergic/Immunologic: negative    Physical Exam:   • Constitutional:	Well-developed, well nourished  • Eyes:	EOMI; PERRL; no drainage or redness  • ENMT:	No oral lesions; no gross abnormalities  • Neck	No bruits; no thyromegaly or nodules  • Breasts:	not examined  • Back:	No deformity or limitation of movement  • Respiratory:	Breath Sounds equal & clear to percussion & auscultation, no accessory muscle use  • Cardiovascular:	Regular rate & rhythm, normal S1, S2; no murmurs, gallops or rubs; no S3, S4  • Gastrointestinal:	Soft, non-tender, no hepatosplenomegaly, normal bowel sounds  • Genitourinary:	not examined  • Rectal: not examined  • Extremities:	No cyanosis, clubbing or edema  • Vascular:	Equal and normal pulses (carotid, femoral, dorsalis pedis)  • Neurologica:l	not examined  • Skin:	No lesions; no rash  • Lymph Nodes:	No lymphadedenopathy  • Musculoskeletal:	No joint pain, swelling or deformity; no limitation of movement        LABS:      CBC Full  -  ( 08 Sep 2020 06:37 )  WBC Count : 9.76 K/uL  RBC Count : 4.22 M/uL  Hemoglobin : 8.4 g/dL  Hematocrit : 30.0 %  Platelet Count - Automated : 378 K/uL  Mean Cell Volume : 71.1 fl  Mean Cell Hemoglobin : 19.9 pg  Mean Cell Hemoglobin Concentration : 28.0 gm/dL  Auto Neutrophil # : x  Auto Lymphocyte # : x  Auto Monocyte # : x  Auto Eosinophil # : x  Auto Basophil # : x  Auto Neutrophil % : x  Auto Lymphocyte % : x  Auto Monocyte % : x  Auto Eosinophil % : x  Auto Basophil % : x    09-08    134<L>  |  104  |  27<H>  ----------------------------<  242<H>  4.9   |  22  |  0.85    Ca    8.4      08 Sep 2020 06:37    TPro  6.5  /  Alb  2.9<L>  /  TBili  0.5  /  DBili  x   /  AST  19  /  ALT  18  /  AlkPhos  137<H>  09-07      < from: [facility] Hepatobiliary Imaging (09.06.20 @ 15:34) >  EXAM:  [facility] HEPATOBILIARY IMG                          PROCEDURE DATE:  09/06/2020          INTERPRETATION:  HEPATOBILIARY SCAN    Indication: 65 years Male with history of percutaneous cholecystostomy. Now assessing for possible cholecystectomy. Cholelithiasis versus cholecystitis.    Technique: The patient was injected with approximately 5 mCi of technetium-99m labeled BAIRON and continuous images were obtained in the anterior projection for about 45 minutes. Additional images were obtained after approximately 1 hour and 2 hours postinjection. Further delayed images were not obtained as patient could not tolerate further imaging.    Comparison: CT of the abdomen dated 9/3/2020 and HIDA scan dated 2/27/2020    Findings: There is prompt uptake of radiopharmaceutical by the liver with prompt excretion into the biliary tree and bowel. The gallbladder is not definitively visualized.    There is persistent cardiac activity suggestive of hepatic dysfunction.      Impression:  Nonvisualization of the gallbladder within 2 hours. Evaluation is limited as patient was unable to tolerate further delayed imaging. Additionally, persistent cardiac indicating hepatic dysfunction as well as previous reports demonstrating severely contracted gallbladder.The combination of which makes false positive on HIDA scan more likely.    < end of copied text >                    RADIOLOGY & ADDITIONAL STUDIES (The following images were personally reviewed):  Loja:                                     No  Urine output:                       adequate  DVT prophylaxis:                 Yes  Flattus:                                  Yes  Bowel movement:              No

## 2020-09-08 NOTE — PROGRESS NOTE ADULT - ASSESSMENT
Assessment: 65M PMHx CAD s/p CABG, CHF, DM, HLD, HTN, acute cholecystitis s/p perc sudhir 2/28, recent R TKA 7/22 presents with R knee infection. General surgery consulted for purulent drainage near prior perc sudhir tube site.     POD4 s/p bedside I&D at perc sudhir site (9/4) with improvement in erythema, induration and purulent expression. WBCs wnl, afebrile, CRP downtrending. Alk phos is downtrending, normal rest of LFTs    Cx from RUQ drainage showed Pseudomonas     HIDA scan was not able to visualize the gall bladder, patient was not able to tolerate delayed imaging, possible false positive finding    Plan discussed with Dr. Latif and Chief resident     - change guaze and tape daily, monitor for increased induration or purulent expression   - monitor WBCs and fevers   - FU on Gallium study to properly visualize the gall bladder  - Team 4c will continue to follow

## 2020-09-08 NOTE — PROGRESS NOTE ADULT - SUBJECTIVE AND OBJECTIVE BOX
INTERVAL HPI/OVERNIGHT EVENTS:  - seen at bedside. Still complaining of intermitten R knee pain. Also endorses increase in discharge from RUQ     CONSTITUTIONAL:  No fever, chills, night sweats  EYES:  No photophobia or visual changes  CARDIOVASCULAR:  No chest pain  RESPIRATORY:  No cough, wheezing, or SOB   GASTROINTESTINAL:  No nausea, vomiting, diarrhea, constipation, or abdominal pain  GENITOURINARY:  No frequency, urgency, dysuria or hematuria  NEUROLOGIC:  No headache, lightheadedness      ANTIBIOTICS/RELEVANT:    Daptomycin 675 mg IV q24h (9/6-present)  Cefepime 2 g IV q8h (9/6-present)  Rifampin 300 mg po q12h      Vital Signs Last 24 Hrs  T(C): 36.7 (08 Sep 2020 07:16), Max: 36.8 (08 Sep 2020 01:27)  T(F): 98.1 (08 Sep 2020 07:16), Max: 98.2 (08 Sep 2020 01:27)  HR: 93 (08 Sep 2020 08:47) (77 - 93)  BP: 118/56 (08 Sep 2020 08:47) (91/52 - 118/56)  BP(mean): --  RR: 17 (08 Sep 2020 08:47) (17 - 20)  SpO2: 99% (08 Sep 2020 08:47) (99% - 100%)      PHYSICAL EXAM:  Constitutional:  Well-developed, well nourished, non-toxic  Eyes:  Sclerae anicteric, conjunctivae clear, PERRL  Ear/Nose/Throat:  No nasal exudate or sinus tenderness;  No buccal mucosal lesions, no pharyngeal erythema or exudate	  Neck:  Supple, no adenopathy  Respiratory:  Clear bilaterally  Cardiovascular:  AICD pocket without erythema, tenderness or warmth.  RRR, S1S2, no murmur appreciated  Gastrointestinal:  Symmetric, normoactive BS, soft, NT, no masses, guarding or rebound.  No HSM.  I&D site dressed by surgery  Extremities:  No edema;  R hip incision dressed, R knee incision with eschar      LABS:                        8.4    9.76  )-----------( 378      ( 08 Sep 2020 06:37 )             30.0         09-08    134<L>  |  104  |  27<H>  ----------------------------<  242<H>  4.9   |  22  |  0.85    Ca    8.4      08 Sep 2020 06:37    TPro  6.5  /  Alb  2.9<L>  /  TBili  0.5  /  DBili  x   /  AST  19  /  ALT  18  /  AlkPhos  137<H>  09-07          MICROBIOLOGY:    Blood cultures:  9/1 X 2 - NG  Abd wall wound 9/3 - VRE. faecium and Pseudomonas aeruginosa    OR cultures R hip 9/4:  NGTD    Cultures from abd wall I&D 9/4 - VRE. faecium and Pseudomonas aeruginosa INTERVAL HPI/OVERNIGHT EVENTS:  Seen at bedside. Still complaining of intermittent R knee pain. Also endorses increase in discharge from RUQ     CONSTITUTIONAL:  No fever, chills, night sweats  EYES:  No photophobia or visual changes  CARDIOVASCULAR:  No chest pain  RESPIRATORY:  No cough, wheezing, or SOB   GASTROINTESTINAL:  No nausea, vomiting, diarrhea, constipation, or abdominal pain  GENITOURINARY:  No frequency, urgency, dysuria or hematuria  NEUROLOGIC:  No headache, lightheadedness      ANTIBIOTICS/RELEVANT:    Daptomycin 675 mg IV q24h (9/6-present)  Cefepime 2 g IV q8h (9/6-present)  Rifampin 300 mg po q12h      Vital Signs Last 24 Hrs  T(C): 36.7 (08 Sep 2020 07:16), Max: 36.8 (08 Sep 2020 01:27)  T(F): 98.1 (08 Sep 2020 07:16), Max: 98.2 (08 Sep 2020 01:27)  HR: 93 (08 Sep 2020 08:47) (77 - 93)  BP: 118/56 (08 Sep 2020 08:47) (91/52 - 118/56)  BP(mean): --  RR: 17 (08 Sep 2020 08:47) (17 - 20)  SpO2: 99% (08 Sep 2020 08:47) (99% - 100%)      PHYSICAL EXAM:  Constitutional:  Well-developed, well nourished, non-toxic  Eyes:  Sclerae anicteric, conjunctivae clear, PERRL  Ear/Nose/Throat:  No nasal exudate or sinus tenderness;  No buccal mucosal lesions, no pharyngeal erythema or exudate	  Neck:  Supple, no adenopathy  Respiratory:  Clear bilaterally  Cardiovascular:  AICD pocket without erythema, tenderness or warmth.  RRR, S1S2, no murmur appreciated  Gastrointestinal:  Symmetric, normoactive BS, soft, NT, no masses, guarding or rebound.  No HSM.  I&D site dressed by surgery  Extremities:  No edema;  R hip incision dressed, R knee incision with eschar      LABS:                        8.4    9.76  )-----------( 378      ( 08 Sep 2020 06:37 )             30.0         09-08    134<L>  |  104  |  27<H>  ----------------------------<  242<H>  4.9   |  22  |  0.85    Ca    8.4      08 Sep 2020 06:37    TPro  6.5  /  Alb  2.9<L>  /  TBili  0.5  /  DBili  x   /  AST  19  /  ALT  18  /  AlkPhos  137<H>  09-07          MICROBIOLOGY:    Blood cultures:  9/1 X 2 - NG  Abd wall wound 9/3 - VRE. faecium and Pseudomonas aeruginosa    OR cultures R hip 9/4:  NGTD    Cultures from abd wall I&D 9/4 - VRE. faecium and Pseudomonas aeruginosa

## 2020-09-08 NOTE — PROGRESS NOTE ADULT - SUBJECTIVE AND OBJECTIVE BOX
Orthopaedic Surgery Progress Note    S/p R knee antibiotic spacer 07/22 who presented with R knee swelling and RUQ wound drainage on 09/01/2020    Subjective:     Patient seen and examined. Patient comfortable. States the drainage in his abdomen has improved. States his R knee pain is the same. He is concerned about the L leg wound.  Denies chest pain, shortness of breath, nausea/vomiting, numbness/tingling.    Objective:    Vital Signs Last 24 Hrs  T(C): 36.8 (09-08-20 @ 12:07), Max: 37 (09-08-20 @ 08:47)  T(F): 98.2 (09-08-20 @ 12:07), Max: 98.6 (09-08-20 @ 08:47)  HR: 86 (09-08-20 @ 12:07) (86 - 93)  BP: 120/70 (09-08-20 @ 12:07) (104/53 - 120/70)  RR: 17 (09-08-20 @ 12:07) (17 - 18)  SpO2: 99% (09-08-20 @ 12:07) (99% - 99%)  AVSS    PE:  General: Patient alert and oriented, NAD  RLE: R hip dressing c/d/i. R knee with 2cm area of granulation tissue and fibrinous tissue w/o tenderness or erythema, proximal and distal wound edges are healed well,   LLE: Anterior tibia with skin graft s/p debridement by plastic surgery, no erythema  Pulses: 2+ DP BLE  Sensation: SILT BLE   Motor: EHL/FHL/TA/GS 5/5 BLE                          8.4    9.76  )-----------( 378      ( 08 Sep 2020 06:37 )             30.0   08 Sep 2020 06:37    134    |  104    |  27     ----------------------------<  242    4.9     |  22     |  0.85       TPro  6.5    /  Alb  2.9    /  TBili  0.5    /  DBili  x      /  AST  19     /  ALT  18     /  AlkPhos  137    07 Sep 2020 07:42      A/P: 65yMale s/p R knee antibiotic spacer 07/22 who presented with R knee swelling and RUQ wound drainage on 09/01/2020  1. Pain control as needed. Appreciate pain management recs.   2. DVT prophylaxis: ASA, Effient   3. PT, weight-bearing status: WBAT BLE   4. Appreciate Gen Surg recs for RUQ wound. Pt feels that wound is improving. HIDA scan inconclusive. Gallium scan ordered.   5. Continue antibiotics. Appreciate ID recs.   6. Left leg wound managed by plastics and vascular. Wound care consulted today. Appreciate recs.  7. Psych consult today for depression.  8. Dispo: pending improvement     Ortho Pager [phone]

## 2020-09-08 NOTE — PROGRESS NOTE ADULT - SUBJECTIVE AND OBJECTIVE BOX
Subjective: Pt comfortable without complaints, Denies CP, SOB, N/V, numbness/tingling. Pt notes increased drainage from his abdominal wound overnight, otherwise is comfortable.    Vital Signs Last 24 Hrs  T(C): 36.8 (08 Sep 2020 01:27), Max: 36.8 (08 Sep 2020 01:27)  T(F): 98.2 (08 Sep 2020 01:27), Max: 98.2 (08 Sep 2020 01:27)  HR: 85 (08 Sep 2020 01:27) (77 - 90)  BP: 116/55 (08 Sep 2020 01:27) (91/52 - 116/55)  BP(mean): 78 (07 Sep 2020 06:11) (78 - 78)  RR: 20 (08 Sep 2020 01:27) (18 - 20)  SpO2: 99% (08 Sep 2020 01:27) (94% - 100%)    General: Pt alert and oriented, NAD  RLE: R hip dressing c/d/i. Dressing in place over knee, wound assessed- 5mm wound at proximal aspect of incision which pt states has been there >1y. Proximal and distal extents of incision closed w/o dehiscence or drainage. In central portion of incision wound with granulation tissue present, minimal amount fibrinous tissue w/o drainage or surrounding erythema. SILT L2-S1. EHL/FHL/TA/GS intact  LLE: vascular wound present, managed by plastic surg w/ WTD dressing. WTD dressing in place. Feet WWP        Labs:                  A/P: 65yMale POD#4 s/p Right hip VINOD 9/4  - Pain Control: IV and PO PRN   - DVT ppx: ASA 81mg BID, effient   - PT, WBS: WBAT RLE   - F/u ID recs- currently on dapto/cefepime/rifampin, check CPK QSaturday for Dapto as per ID  - Hip cultures still NGTD; only abd cultures + pseudomonas, enterococcus  - F/u gen surg recs; HIDA scan done, awaiting final reads  - Will obtain Plastic Surgery consult today with Dr. Lerman; Daily wet-dry dressing changes for LLE for now  - Psych consult today, pt has had depressed affect recently and would like to speak to someone      Ortho Pager [phone]

## 2020-09-08 NOTE — BEHAVIORAL HEALTH ASSESSMENT NOTE - NSBHCHARTREVIEWINVESTIGATE_PSY_A_CORE FT
EKG 9/3    Ventricular Rate 84 BPM    Atrial Rate 84 BPM    P-R Interval 174 ms    QRS Duration 174 ms    Q-T Interval 426 ms    QTC Calculation(Bezet) 503 ms    P Axis 52 degrees    R Axis -52 degrees    T Axis 107 degrees    Diagnosis Line Electronic ventricular pacemaker

## 2020-09-08 NOTE — BEHAVIORAL HEALTH ASSESSMENT NOTE - SUICIDE PROTECTIVE FACTORS
Has future plans/Identifies reasons for living/Cultural, spiritual and/or moral attitudes against suicide/Fear of death or the actual act of killing self/Supportive social network of family or friends/Ability to cope with stress

## 2020-09-08 NOTE — BEHAVIORAL HEALTH ASSESSMENT NOTE - OTHER
loss of function and multiple hospital admissions over last year mildly impaired, noted with sudden and poorly coordinated movements (ie when trying to move objects around on tray) at times moving all extremities, motor exam deferred due to COVID precautions stable posture talkative, overinclusive but not rapid or pressured quickly becomes tearful when discussing stressors at times tangential focused on multiple recent stressors and losses, especially health-related decline mildly impaired, delayed recall 2/3 items in five minutes, some mild word-finding difficulty current anxiety, demoralization about illness

## 2020-09-08 NOTE — PROGRESS NOTE ADULT - SUBJECTIVE AND OBJECTIVE BOX
INTERVAL HISTORY:  c/o knee pain. Ambulating w/o dyspnea. 	  Chart, PMH medications and allergies reviewed    MEDICATIONS:  MEDICATIONS  (STANDING):  acetaminophen   Tablet .. 650 milliGRAM(s) Oral every 6 hours  aspirin  chewable 81 milliGRAM(s) Oral two times a day  cefepime   IVPB      cefepime   IVPB 2000 milliGRAM(s) IV Intermittent every 8 hours  DAPTOmycin IVPB 675 milliGRAM(s) IV Intermittent every 24 hours  dextrose 5%. 1000 milliLiter(s) (50 mL/Hr) IV Continuous <Continuous>  dextrose 50% Injectable 12.5 Gram(s) IV Push once  dextrose 50% Injectable 25 Gram(s) IV Push once  dextrose 50% Injectable 25 Gram(s) IV Push once  digoxin     Tablet 0.125 milliGRAM(s) Oral daily  gabapentin 100 milliGRAM(s) Oral every 8 hours  insulin glargine Injectable (LANTUS) 16 Unit(s) SubCutaneous at bedtime  insulin lispro (HumaLOG) corrective regimen sliding scale   SubCutaneous Before meals and at bedtime  insulin lispro Injectable (HumaLOG) 8 Unit(s) SubCutaneous three times a day before meals  lactated ringers. 1000 milliLiter(s) (50 mL/Hr) IV Continuous <Continuous>  lidocaine 2% Injectable 10 milliLiter(s) Local Injection once  metoprolol succinate ER 25 milliGRAM(s) Oral daily  polyethylene glycol 3350 17 Gram(s) Oral daily  prasugrel 10 milliGRAM(s) Oral daily  rifAMPin 300 milliGRAM(s) Oral every 12 hours  rosuvastatin 10 milliGRAM(s) Oral at bedtime  spironolactone 25 milliGRAM(s) Oral daily  tamsulosin 0.4 milliGRAM(s) Oral at bedtime      REVIEW OF SYSTEMS:  CONSTITUTIONAL: No fever, no weight loss, no fatigue  PULMONARY: No cough, no wheezing, chills no hemoptysis; No Shortness of Breath  CARDIOVASCULAR: No chest pain, no palpitations, no syncope, dizziness, no leg swelling  GASTROINTESTINAL: No abdominal pain. No nausea, no  vomiting, no hematemesis; No diarrhea, no constipation. No melena, no hematochezia.  GENITOURINARY: No dysuria, no frequency, no hematuria, no incontinence  SKIN: No itching, no burning, no rashes, no lesions     PHYSICAL EXAM:  T(C): 36.8 (09-08-20 @ 12:07), Max: 37 (09-08-20 @ 08:47)  HR: 86 (09-08-20 @ 12:07) (77 - 93)  BP: 120/70 (09-08-20 @ 12:07) (91/52 - 120/70)  RR: 17 (09-08-20 @ 12:07) (17 - 20)  SpO2: 99% (09-08-20 @ 12:07) (99% - 100%)  Wt(kg): --  I&O's Summary    07 Sep 2020 07:01  -  08 Sep 2020 07:00  --------------------------------------------------------  IN: 150 mL / OUT: 1200 mL / NET: -1050 mL        Appearance:  No deformities, normal appearance	  HEENT:   Normal oral mucosa, PERRL, EOMI	  Neck: No jvd , no masses  Lymphatic: No  lymphadenopathy  Pulmonary: Lungs clear to auscultation and percussion  Cardiovascular: Normal S1 S2, No JVD, No murmur, No edema	  Abdomen:  Soft, Non-tender, + BS  Skin: No rashes, No ecchymoses	  Extremities:  No clubbing or cyanosis  MSK: Normal range of motion, +joint swelling    LABS:		  CARDIAC MARKERS:                         8.4    9.76  )-----------( 378      ( 08 Sep 2020 06:37 )             30.0   09-08    134<L>  |  104  |  27<H>  ----------------------------<  242<H>  4.9   |  22  |  0.85    Ca    8.4      08 Sep 2020 06:37    TPro  6.5  /  Alb  2.9<L>  /  TBili  0.5  /  DBili  x   /  AST  19  /  ALT  18  /  AlkPhos  137<H>  09-07    proBNP:  Lipid Profile:  HgA1c:  TSH:      Culture - Fungal, Tissue (collected 09-04-20 @ 22:31)  Source: .Tissue (5)Right Hip Tissue  Preliminary Report (09-05-20 @ 15:09):    Testing in progress    Culture - Acid Fast - Tissue w/Smear (collected 09-04-20 @ 22:31)  Source: .Tissue (5)Right Hip Tissue    Culture - Acid Fast - Body Fluid w/Smear (collected 09-04-20 @ 22:31)  Source: .Body Fluid (4)Right Hip Curratage    Culture - Fungal, Body Fluid (collected 09-04-20 @ 22:31)  Source: .Body Fluid (4)Right Hip Curratage  Preliminary Report (09-05-20 @ 15:03):    Testing in progress    Culture - Fungal, Body Fluid (collected 09-04-20 @ 22:31)  Source: .Body Fluid (3)Right Hip Aspirated Fluid  Preliminary Report (09-05-20 @ 15:03):    Testing in progress    Culture - Acid Fast - Body Fluid w/Smear (collected 09-04-20 @ 22:31)  Source: .Body Fluid (3)Right Hip Aspirated Fluid    Culture - Fungal, Other (collected 09-04-20 @ 22:31)  Source: .Other (2)Right Hip Log Screw  Preliminary Report (09-05-20 @ 15:20):    Testing in progress    Culture - Acid Fast - Other w/Smear (collected 09-04-20 @ 22:31)  Source: .Other (2)Right Hip Log Screw    Culture - Acid Fast - Other w/Smear (collected 09-04-20 @ 22:31)  Source: .Other (1)Right Hip Interlocking Screw    Culture - Fungal, Other (collected 09-04-20 @ 22:31)  Source: .Other (1)Right Hip Interlocking Screw  Preliminary Report (09-05-20 @ 15:09):    Testing in progress    Culture - Surgical Swab (collected 09-04-20 @ 19:06)  Source: .Surgical Swab abdominal wall abscess  Gram Stain (09-04-20 @ 20:04):    No organisms seen    Moderate WBC's  Final Report (09-06-20 @ 10:12):    Rare Pseudomonas aeruginosa    Rare Enterococcus faecium (vancomycin resistant)    Floor previously notified.    See previous culture susceptibility  Organism: Pseudomonas aeruginosa (09-06-20 @ 10:12)  Organism: Pseudomonas aeruginosa (09-06-20 @ 10:12)      -  Aztreonam: S <=4      -  Cefepime: S <=2      -  Ciprofloxacin: S <=0.25      -  Gentamicin: S 4      -  Piperacillin/Tazobactam: S <=8      -  Tobramycin: S <=2      Method Type: YOLIE    Culture - Body Fluid with Gram Stain (collected 09-04-20 @ 19:06)  Source: Abdominal Fl Abdominal Fluid  Gram Stain (09-04-20 @ 20:01):    No organisms seen    Moderate WBC's  Final Report (09-06-20 @ 10:05):    Few Pseudomonas aeruginosa    Rare Enterococcus faecium (vancomycin resistant)    Result called to and read back byCuauhtemoc MAK RN  09/06/2020 10:05:10  Organism: Pseudomonas aeruginosa  Enterococcus faecium (vancomycin resistant) (09-06-20 @ 10:05)  Organism: Enterococcus faecium (vancomycin resistant) (09-06-20 @ 10:05)      -  Ampicillin: R >8 Predicts results to ampicillin/sulbactam, amoxacillin-clavulanate and  piperacillin-tazobactam.      -  Levofloxacin: R >4      -  Linezolid: S 1      -  Vancomycin: R >16      Method Type: YOLIE  Organism: Pseudomonas aeruginosa (09-06-20 @ 10:05)      -  Aztreonam: S <=4      -  Cefepime: S <=2      -  Ciprofloxacin: S <=0.25      -  Gentamicin: S 4      -  Piperacillin/Tazobactam: S <=8      -  Tobramycin: S <=2      Method Type: YOLIE    Culture - Tissue with Gram Stain (collected 09-04-20 @ 15:30)  Source: .Tissue (2)Right Hip Log Screw  Gram Stain (09-04-20 @ 15:31):    Test cannot be performed on this type of specimen.  Final Report (09-08-20 @ 09:01):    No growth    Culture - Body Fluid with Gram Stain (collected 09-04-20 @ 15:18)  Source: .Body Fluid (4)Right Hip Curratage  Gram Stain (09-04-20 @ 16:39):    No organisms seen    Few WBC's  Preliminary Report (09-06-20 @ 09:38):    No growth to date.    Culture - Body Fluid with Gram Stain (collected 09-04-20 @ 15:17)  Source: .Body Fluid (3)Right Hip Aspirated Fluid  Gram Stain (09-04-20 @ 16:14):    No organisms seen    Rare WBC's  Preliminary Report (09-05-20 @ 09:00):    No growth to date    Culture - Tissue with Gram Stain (collected 09-04-20 @ 15:14)  Source: .Tissue (1)Right Hip Interlocking Screw  Gram Stain (09-04-20 @ 15:32):    Test cannot be performed on this type of specimen.  Final Report (09-08-20 @ 08:59):    No growth    Culture - Tissue with Gram Stain (collected 09-04-20 @ 15:11)  Source: .Tissue (5)Right Hip Tissue  Gram Stain (09-04-20 @ 16:10):    No organisms seen    No WBC's seen.  Final Report (09-08-20 @ 09:49):    No growth    Culture - Fungal, Body Fluid (collected 09-04-20 @ 01:44)  Source: .Body Fluid abdominal drain RUQ  Preliminary Report (09-04-20 @ 08:14):    Testing in progress    Culture - Body Fluid with Gram Stain (collected 09-03-20 @ 16:41)  Source: Abdominal Fl abdominal drain RUQ  Gram Stain (09-03-20 @ 20:35):    No organisms seen    Moderate WBC's  Final Report (09-06-20 @ 10:00):    Few Pseudomonas aeruginosa    Rare to few Enterococcus faecium (vancomycin resistant)    Result called to and read back byCuauhtemoc MAK RN  09/06/2020 09:59:47  Organism: Pseudomonas aeruginosa  Enterococcus faecium (vancomycin resistant)  Enterococcus faecium (vancomycin resistant) (09-06-20 @ 09:45)  Organism: Enterococcus faecium (vancomycin resistant) (09-06-20 @ 09:45)      -  Daptomycin: SDD 2 The breakpoint for SDD (sensitive dose dependent)is based on a dosage regimen of 8-12 mg/kg administered every 24 h in adults and is intended for serious infections due to E. faecium. Consultation with an infectious diseases specialist is recommended.      Method Type: ETEST  Organism: Enterococcus faecium (vancomycin resistant) (09-06-20 @ 09:44)      -  Ampicillin: R >8 Predicts results to ampicillin/sulbactam, amoxacillin-clavulanate and  piperacillin-tazobactam.      -  Chloramphenicol: S <=8      -  Levofloxacin: R >4      -  Linezolid: S 1      -  Vancomycin: R >16      Method Type: YOLIE  Organism: Pseudomonas aeruginosa (09-05-20 @ 09:34)      -  Aztreonam: S 8      -  Cefepime: S <=2      -  Ciprofloxacin: S <=0.25      -  Gentamicin: S 4      -  Piperacillin/Tazobactam: S 16      -  Tobramycin: S <=2      Method Type: YOLIE      TELEMETRY: A sense V pace	      QTC     ECG:  	   QTC         RADIOLOGY:   DIAGNOSTIC TESTING: [ ] Echocardiogram: [ ]  Catheterization: [ ] Stress Test:      ASSESSMENT/PLAN: 	  Severely reduced ejection fraction-The patient is ambulating without dyspnea. His chest is clear. Continue metoprolol digoxin and spironolactone.    Coronary artery disease-the patient denies chest discomfort. His EKG is uninterpretable secondary to the pacemaker. He was revascularized less than two years ago. There is no clinical evidence for ischemia. On rosuvastatin. The patient is history of stent thrombosis. He had a stent placed less than two years ago. continue prasugrel and aspirin.     Defibrillator-no recent events.

## 2020-09-08 NOTE — BEHAVIORAL HEALTH ASSESSMENT NOTE - DIFFERENTIAL
Adjustment disorder with mixed anxiety and depressed mood  R/o KELLY  Unspecified neurocognitive d/o

## 2020-09-08 NOTE — PROGRESS NOTE ADULT - SUBJECTIVE AND OBJECTIVE BOX
Patient denies abdominal pain. Denies N/V. Dressing changed at bedside and patient notes decreased purulence and induration from the area. Underwent HIDA scan this am that was not able to visualize the gall bladder as well.    MEDICATIONS  (STANDING):  acetaminophen   Tablet .. 650 milliGRAM(s) Oral every 6 hours  aspirin  chewable 81 milliGRAM(s) Oral two times a day  cefepime   IVPB      cefepime   IVPB 2000 milliGRAM(s) IV Intermittent every 8 hours  DAPTOmycin IVPB 675 milliGRAM(s) IV Intermittent every 24 hours  dextrose 5%. 1000 milliLiter(s) (50 mL/Hr) IV Continuous <Continuous>  dextrose 50% Injectable 12.5 Gram(s) IV Push once  dextrose 50% Injectable 25 Gram(s) IV Push once  dextrose 50% Injectable 25 Gram(s) IV Push once  digoxin     Tablet 0.125 milliGRAM(s) Oral daily  gabapentin 100 milliGRAM(s) Oral every 8 hours  insulin glargine Injectable (LANTUS) 16 Unit(s) SubCutaneous at bedtime  insulin lispro (HumaLOG) corrective regimen sliding scale   SubCutaneous Before meals and at bedtime  insulin lispro Injectable (HumaLOG) 8 Unit(s) SubCutaneous three times a day before meals  lactated ringers. 1000 milliLiter(s) (50 mL/Hr) IV Continuous <Continuous>  lidocaine 2% Injectable 10 milliLiter(s) Local Injection once  metoprolol succinate ER 25 milliGRAM(s) Oral daily  polyethylene glycol 3350 17 Gram(s) Oral daily  prasugrel 10 milliGRAM(s) Oral daily  rifAMPin 300 milliGRAM(s) Oral every 12 hours  rosuvastatin 10 milliGRAM(s) Oral at bedtime  spironolactone 25 milliGRAM(s) Oral daily  tamsulosin 0.4 milliGRAM(s) Oral at bedtime    MEDICATIONS  (PRN):  aluminum hydroxide/magnesium hydroxide/simethicone Suspension 30 milliLiter(s) Oral four times a day PRN Indigestion  bisacodyl Suppository 10 milliGRAM(s) Rectal daily PRN If no bowel movement by POD#2  cyclobenzaprine 5 milliGRAM(s) Oral three times a day PRN Muscle Spasm  dextrose 40% Gel 15 Gram(s) Oral once PRN Blood Glucose LESS THAN 70 milliGRAM(s)/deciliter  diazepam    Tablet 5 milliGRAM(s) Oral every 8 hours PRN anxiety/agitaiton  diphenhydrAMINE 50 milliGRAM(s) Oral every 4 hours PRN Rash and/or Itching  glucagon  Injectable 1 milliGRAM(s) IntraMuscular once PRN Glucose LESS THAN 70 milligrams/deciliter  morphine  - Injectable 2 milliGRAM(s) IV Push every 4 hours PRN breakthrough pain  morphine  IR 15 milliGRAM(s) Oral every 4 hours PRN Moderate Pain (4 - 6)  morphine  IR 30 milliGRAM(s) Oral every 4 hours PRN Severe Pain (7 - 10)  ondansetron Injectable 4 milliGRAM(s) IV Push every 6 hours PRN Nausea and/or Vomiting  senna 2 Tablet(s) Oral at bedtime PRN Constipation      I&O's Detail    07 Sep 2020 07:01  -  08 Sep 2020 07:00  --------------------------------------------------------  IN:    Solution: 100 mL    Solution: 50 mL  Total IN: 150 mL    OUT:    Voided: 1200 mL  Total OUT: 1200 mL    Total NET: -1050 mL      Vital Signs Last 24 Hrs  T(C): 36.6 (08 Sep 2020 17:15), Max: 37 (08 Sep 2020 08:47)  T(F): 97.8 (08 Sep 2020 17:15), Max: 98.6 (08 Sep 2020 08:47)  HR: 80 (08 Sep 2020 17:15) (80 - 93)  BP: 104/59 (08 Sep 2020 17:15) (104/53 - 120/70)  BP(mean): --  RR: 17 (08 Sep 2020 17:15) (17 - 20)  SpO2: 100% (08 Sep 2020 17:15) (99% - 100%)    General: NAD, resting comfortably in bed, chatty and working on his computer   C/V: pulses present in b/l upper and lower extremities   Pulm: Nonlabored breathing, no respiratory distress  Abd: soft, ND, NT, no rebound or guarding, incision site with minimal erythema, minimal induration, minimal fluid/purulent material, dressings mostly clean and dry with some minimal yellow purulent fluid and residual packing  Extrem: WWP, no edema, no SCDs, b/l lower extremity bandages                             8.4    9.76  )-----------( 378      ( 08 Sep 2020 06:37 )             30.0   09-08    134<L>  |  104  |  27<H>  ----------------------------<  242<H>  4.9   |  22  |  0.85    Ca    8.4      08 Sep 2020 06:37    TPro  6.5  /  Alb  2.9<L>  /  TBili  0.5  /  DBili  x   /  AST  19  /  ALT  18  /  AlkPhos  137<H>  09-07    Culture - Body Fluid with Gram Stain (09.03.20 @ 16:41)    -  Cefepime: S <=2    -  Ampicillin: R >8 Predicts results to ampicillin/sulbactam, amoxacillin-clavulanate and  piperacillin-tazobactam.    -  Aztreonam: S 8    -  Vancomycin: R >16    -  Tobramycin: S <=2    -  Piperacillin/Tazobactam: S 16    -  Chloramphenicol: S <=8    -  Ciprofloxacin: S <=0.25    -  Daptomycin: SDD 2 The breakpoint for SDD (sensitive dose dependent)is based on a dosage regimen of 8-12 mg/kg administered every 24 h in adults and is intended for serious infections due to E. faecium. Consultation with an infectious diseases specialist is recommended.    -  Gentamicin: S 4    -  Linezolid: S 1    -  Levofloxacin: R >4    Gram Stain:   No organisms seen  Moderate WBC's    Specimen Source: Abdominal Fl abdominal drain RUQ    Culture Results:   Few Pseudomonas aeruginosa  Rare to few Enterococcus faecium (vancomycin resistant)  Result called to and read back by_ Nick MAK RN  09/06/2020 09:59:47    Organism Identification: Pseudomonas aeruginosa  Enterococcus faecium (vancomycin resistant)  Enterococcus faecium (vancomycin resistant)    Organism: Pseudomonas aeruginosa    Organism: Enterococcus faecium (vancomycin resistant)    Organism: Enterococcus faecium (vancomycin resistant)    Method Type: YOLIE    Method Type: YOLIE    Method Type: ETEST

## 2020-09-08 NOTE — BEHAVIORAL HEALTH ASSESSMENT NOTE - AXIS III
hyperlipidemia, type II DM with peripheral neuropathy, CAD s/p MIDCAB (2018)/VERONICA, HFrEF, AICD (2/20), pulmonary HTN, and recurrent R knee joint infections

## 2020-09-08 NOTE — PROGRESS NOTE ADULT - PROBLEM SELECTOR PLAN 1
on broad spectrum antibiotics,   f/u intraop c/s  f/u vanco level and vanco was adjusted  I will discuss with ortho regarding the recent cultures from the right hip and is negative but the abdominal wound cultures are positive fro VREF and Pseudomonas.  He is on vanco and Rifampin  culture positive for MRSA with negative blood culture repeat but 4 bottles were positive on admission  JOHN PAUL unlikely endocarditis but might need to be treated as such.  The AICD bed is stable with no signs of infection  On Vanco and Rifampin, PICC line Monday.   hardware removed and follow on cultures which is negative to date  he is stable and he has area of necrosis over the wound and is followed by plastics  sepsis resolved and he is on antibiotic  I discussed with surgery and ID.  he is daptomycin and will follow with gallium scan  results of the hida reviewed and discussed with ID

## 2020-09-08 NOTE — BEHAVIORAL HEALTH ASSESSMENT NOTE - RISK ASSESSMENT
Low Acute Suicide Risk at low acute and chronic risk for suicide given absence of current or past SI, future-orientation, engagement in treatment, help seeking behaviors for acute issues (medical and psychiatric), with strong social support

## 2020-09-08 NOTE — PROGRESS NOTE ADULT - SUBJECTIVE AND OBJECTIVE BOX
Pain Management Progress Note - [facility] [phone]      HPI: Patient seen and examined today. Patient with a history of knee pain, diabetic neuropathy, CHF, HTN, MRSA bacteremia, diverticulitis, COPD, hyperkalemia, chronic systolic CHF, osteoarthritis,  s/p R Revision TKA and antibiotic spacer by Dr. Castro on 7/22, now presenting with R knee pain and swelling x3 days. Patient reports RUQ abdominal pain, R hip pain, R knee pain. Patient reports Oxycodone PO and Dilaudid PO makes him itchy, patient denies any sensation of throat closing or angioedema. Reviewed pain medication regimen with patient and ortho team.         Pain is _x__ sharp ____dull ___burning _x__achy ___ Intensity: ____ mild _x__mod x__severe     Location _x___surgical site ____cervical _____lumbar ____abd ____upper ext____lower ext    Worse with _x___activity _x___movement _____physical therapy___ Rest    Improved with _x___medication __x__rest ____physical therapy      HYDROmorphone  Injectable  spironolactone Oral Tab/Cap - Peds  acetaminophen   Tablet ..  aspirin enteric coated  celecoxib  oxyCODONE  ER Tablet  oxyCODONE    IR  tamsulosin Oral Tab/Cap - Peds  DULoxetine  atorvastatin  prasugrel  rifAMPin  metoprolol succinate ER  silver sulfADIAZINE 1% Cream  pantoprazole    Tablet  vancomycin  IVPB  spironolactone  tamsulosin  insulin lispro (HumaLOG) corrective regimen sliding scale  dextrose 5%.  dextrose 40% Gel  dextrose 50% Injectable  HYDROmorphone  Injectable  gabapentin  cyclobenzaprine  sodium chloride 0.9% lock flush  chlorhexidine 4% Liquid  diphenhydrAMINE  spironolactone  povidone iodine 5% Nasal Swab  chlorhexidine 2% Cloths  lactated ringers.  rosuvastatin  oxyCODONE    IR  oxyCODONE    IR  vancomycin  IVPB  vancomycin  IVPB  vancomycin  IVPB  morphine  - Injectable  oxyCODONE    IR  oxyCODONE    IR  lactated ringers.  acetaminophen   Tablet ..  aluminum hydroxide/magnesium hydroxide/simethicone Suspension  ondansetron Injectable  polyethylene glycol 3350  bisacodyl Suppository  senna  HYDROmorphone  Injectable  aspirin  chewable  vancomycin  IVPB  digoxin     Tablet  prasugrel  metoprolol succinate ER  atorvastatin  spironolactone  cyclobenzaprine  gabapentin  rosuvastatin  tamsulosin  lidocaine 2% Injectable  lidocaine 2% Injectable  dextrose 5%.  dextrose 40% Gel  dextrose 50% Injectable  dextrose 50% Injectable  dextrose 50% Injectable  glucagon  Injectable  rifAMPin  HYDROmorphone  Injectable  insulin lispro (HumaLOG) corrective regimen sliding scale  insulin glargine Injectable (LANTUS)  insulin lispro Injectable (HumaLOG)  DAPTOmycin IVPB  cefepime   IVPB  cefepime   IVPB  cefepime   IVPB  diazepam    Tablet  insulin glargine Injectable (LANTUS)  diphenhydrAMINE  insulin lispro Injectable (HumaLOG)  HYDROmorphone   Tablet  HYDROmorphone   Tablet  morphine  - Injectable  morphine  IR  morphine  IR      ROS: Const:  -___febrile   Eyes:___ENT:___CV: __-_chest pain  Resp: __-__sob  GI:_-__nausea __-_vomiting _x_ abd pain ___npo ___clears _x_full diet __bm  :___ Musk: _x__pain _-__spasm  Skin:___ Neuro:  _-__rboytpza_-__wxyimzqao_-__ numbness _-__weakness __x_paresth  Psych:_-_anxiety  Endo:___ Heme:___Allergy:_________, _x__all others reviewed and negative      PAST MEDICAL & SURGICAL HISTORY:  Diabetic neuropathy  STEMI (ST elevation myocardial infarction)  Diverticulitis  MRSA bacteremia  History of celiac disease  CHF (congestive heart failure): EF ~ 25%  HTN (hypertension)  Diabetes: on insulin pump  Blood clot due to device, implant, or graft: was on blood thinners  HLD (hyperlipidemia)  Osteoarthritis  Atherosclerosis of coronary artery: CAD (coronary artery disease)  Status post percutaneous transluminal coronary angioplasty: in 2012  History of open reduction and internal fixation (ORIF) procedure  Surgery, elective: Right shoulder  Surgery, elective: right knee wound debridement  S/P TKR (total knee replacement), right: with infection Mrsa   per pt he was cleared from MRSA infection  S/P CABG x 1: 2018  Stented coronary artery: 10/18 heart attack  INFERIOR WALL MI  Other postprocedural status: Fixation hardware in foot LEFT      09-08 @ 06:3793 mL/min/1.73M2      Hemoglobin: 8.4 g/dL (09-08 @ 06:37)  Hemoglobin: 8.6 g/dL (09-07 @ 07:43)        T(C): 37 (09-08-20 @ 08:47), Max: 37 (09-08-20 @ 08:47)  HR: 93 (09-08-20 @ 08:47) (77 - 93)  BP: 118/56 (09-08-20 @ 08:47) (91/52 - 118/56)  RR: 17 (09-08-20 @ 08:47) (17 - 20)  SpO2: 99% (09-08-20 @ 08:47) (99% - 100%)  Wt(kg): --       PHYSICAL EXAM:  Gen Appearance: x___no acute distress _x__appropriate        Neuro: _x__SILT feet____ EOM Intact Psych: AAOX__3, x___mood/affect appropriate        Eyes: __x_conjunctiva WNL  __x__ Pupils equal and round        ENT: x___ears and nose atraumatic_x__ Hearing grossly intact        Neck: _x__trachea midline, no visible masses ___thyroid without palpable mass    Resp: _x__Nml WOB____No tactile fremitus ___clear to auscultation    Cardio: ___extremities free from edema __x__pedal pulses palpable    GI/Abdomen: __x_soft ___x__ Nontender___x___Nondistended_____HSM    Lymphatic: ___no palpable nodes in neck  ___no palpable nodes calves and feet    Skin/Wound: ___Incision, _x__Dressing c/d/i,   ____surrounding tissues soft,  ___drain/chest tube present____    Muscular: EHL _4__/5  Gastrocnemius_4__/5    ___absent clubbing/cyanosis          ASSESSMENT: This is a 65y old Male with a history of knee pain, diabetic neuropathy, CHF, HTN, MRSA bacteremia, diverticulitis, COPD, hyperkalemia, chronic systolic CHF, osteoarthritis,  s/p R Revision TKA and antibiotic spacer by Dr. Casrto on 7/22, now presenting with R knee pain and swelling x3 days, pain managed with current pain medication regimen.       Recommended Treatment PLAN:  1. Morphine IR 15-30mg Po Q4h prn moderate to severe pain  2. Flexeril 5mg PO Q8h prn muscle spasms  3. Gabapentin 100mg PO TID  4. Morphine 2mg IVP Q4h prn breakthrough pain  Plan discussed with Dr. Max Pain Management Progress Note - [facility] [phone]      HPI: Patient seen and examined today. Patient with a history of knee pain, diabetic neuropathy, CHF, HTN, MRSA bacteremia, diverticulitis, COPD, hyperkalemia, chronic systolic CHF, osteoarthritis,  s/p R Revision TKA and antibiotic spacer by Dr. Castro on 7/22, now presenting with R knee pain and swelling x3 days. Patient reports RUQ abdominal pain, R hip pain, R knee pain. Patient reports Oxycodone PO and Dilaudid PO makes him itchy, patient denies any sensation of throat closing or angioedema. Reviewed pain medication regimen with patient and ortho team.         Pain is _x__ sharp ____dull ___burning _x__achy ___ Intensity: ____ mild _x__mod x__severe     Location _x___surgical site ____cervical _____lumbar ____abd ____upper ext____lower ext    Worse with _x___activity _x___movement _____physical therapy___ Rest    Improved with _x___medication __x__rest ____physical therapy      HYDROmorphone  Injectable  spironolactone Oral Tab/Cap - Peds  acetaminophen   Tablet ..  aspirin enteric coated  celecoxib  oxyCODONE  ER Tablet  oxyCODONE    IR  tamsulosin Oral Tab/Cap - Peds  DULoxetine  atorvastatin  prasugrel  rifAMPin  metoprolol succinate ER  silver sulfADIAZINE 1% Cream  pantoprazole    Tablet  vancomycin  IVPB  spironolactone  tamsulosin  insulin lispro (HumaLOG) corrective regimen sliding scale  dextrose 5%.  dextrose 40% Gel  dextrose 50% Injectable  HYDROmorphone  Injectable  gabapentin  cyclobenzaprine  sodium chloride 0.9% lock flush  chlorhexidine 4% Liquid  diphenhydrAMINE  spironolactone  povidone iodine 5% Nasal Swab  chlorhexidine 2% Cloths  lactated ringers.  rosuvastatin  oxyCODONE    IR  oxyCODONE    IR  vancomycin  IVPB  vancomycin  IVPB  vancomycin  IVPB  morphine  - Injectable  oxyCODONE    IR  oxyCODONE    IR  lactated ringers.  acetaminophen   Tablet ..  aluminum hydroxide/magnesium hydroxide/simethicone Suspension  ondansetron Injectable  polyethylene glycol 3350  bisacodyl Suppository  senna  HYDROmorphone  Injectable  aspirin  chewable  vancomycin  IVPB  digoxin     Tablet  prasugrel  metoprolol succinate ER  atorvastatin  spironolactone  cyclobenzaprine  gabapentin  rosuvastatin  tamsulosin  lidocaine 2% Injectable  lidocaine 2% Injectable  dextrose 5%.  dextrose 40% Gel  dextrose 50% Injectable  dextrose 50% Injectable  dextrose 50% Injectable  glucagon  Injectable  rifAMPin  HYDROmorphone  Injectable  insulin lispro (HumaLOG) corrective regimen sliding scale  insulin glargine Injectable (LANTUS)  insulin lispro Injectable (HumaLOG)  DAPTOmycin IVPB  cefepime   IVPB  cefepime   IVPB  cefepime   IVPB  diazepam    Tablet  insulin glargine Injectable (LANTUS)  diphenhydrAMINE  insulin lispro Injectable (HumaLOG)  HYDROmorphone   Tablet  HYDROmorphone   Tablet  morphine  - Injectable  morphine  IR  morphine  IR      ROS: Const:  -___febrile   Eyes:___ENT:___CV: __-_chest pain  Resp: __-__sob  GI:_-__nausea __-_vomiting _x_ abd pain ___npo ___clears _x_full diet __bm  :___ Musk: _x__pain _-__spasm  Skin:___ Neuro:  _-__srlynchi_-__pwamnrejo_-__ numbness _-__weakness __x_paresth  Psych:_-_anxiety  Endo:___ Heme:___Allergy:_________, _x__all others reviewed and negative      PAST MEDICAL & SURGICAL HISTORY:  Diabetic neuropathy  STEMI (ST elevation myocardial infarction)  Diverticulitis  MRSA bacteremia  History of celiac disease  CHF (congestive heart failure): EF ~ 25%  HTN (hypertension)  Diabetes: on insulin pump  Blood clot due to device, implant, or graft: was on blood thinners  HLD (hyperlipidemia)  Osteoarthritis  Atherosclerosis of coronary artery: CAD (coronary artery disease)  Status post percutaneous transluminal coronary angioplasty: in 2012  History of open reduction and internal fixation (ORIF) procedure  Surgery, elective: Right shoulder  Surgery, elective: right knee wound debridement  S/P TKR (total knee replacement), right: with infection Mrsa   per pt he was cleared from MRSA infection  S/P CABG x 1: 2018  Stented coronary artery: 10/18 heart attack  INFERIOR WALL MI  Other postprocedural status: Fixation hardware in foot LEFT      09-08 @ 06:3793 mL/min/1.73M2      Hemoglobin: 8.4 g/dL (09-08 @ 06:37)  Hemoglobin: 8.6 g/dL (09-07 @ 07:43)        T(C): 37 (09-08-20 @ 08:47), Max: 37 (09-08-20 @ 08:47)  HR: 93 (09-08-20 @ 08:47) (77 - 93)  BP: 118/56 (09-08-20 @ 08:47) (91/52 - 118/56)  RR: 17 (09-08-20 @ 08:47) (17 - 20)  SpO2: 99% (09-08-20 @ 08:47) (99% - 100%)  Wt(kg): --       PHYSICAL EXAM:  Gen Appearance: x___no acute distress _x__appropriate        Neuro: _x__SILT feet____ EOM Intact Psych: AAOX__3, x___mood/affect appropriate        Eyes: __x_conjunctiva WNL  __x__ Pupils equal and round        ENT: x___ears and nose atraumatic_x__ Hearing grossly intact        Neck: _x__trachea midline, no visible masses ___thyroid without palpable mass    Resp: _x__Nml WOB____No tactile fremitus ___clear to auscultation    Cardio: ___extremities free from edema __x__pedal pulses palpable    GI/Abdomen: __x_soft ___x__ Nontender___x___Nondistended_____HSM    Lymphatic: ___no palpable nodes in neck  ___no palpable nodes calves and feet    Skin/Wound: ___Incision, _x__Dressing c/d/i,   ____surrounding tissues soft,  ___drain/chest tube present____    Muscular: EHL _4__/5  Gastrocnemius_4__/5    ___absent clubbing/cyanosis          ASSESSMENT: This is a 65y old Male with a history of knee pain, diabetic neuropathy, CHF, HTN, MRSA bacteremia, diverticulitis, COPD, hyperkalemia, chronic systolic CHF, osteoarthritis,  s/p R Revision TKA and antibiotic spacer by Dr. Castro on 7/22, now presenting with R knee pain and swelling x3 days, pain managed with current pain medication regimen.       Recommended Treatment PLAN:  1. Morphine IR 15-30mg Po Q4h prn moderate to severe pain  2. Flexeril 5mg PO Q8h prn muscle spasms  3. Gabapentin 100mg PO TID  4. Morphine 2mg IVP Q4h prn breakthrough pain  Plan discussed with Dr. Max at bedside

## 2020-09-08 NOTE — BEHAVIORAL HEALTH ASSESSMENT NOTE - NSBHCONSULTFOLLOWAFTERCARE_PSY_A_CORE FT
referral to outpatient psychiatric care for psychotherapy and medication management. will return to provide referral information

## 2020-09-08 NOTE — BEHAVIORAL HEALTH ASSESSMENT NOTE - SUMMARY
66yo , domiciled man, retired from [employer], with a self-reported psychiatric history of anxiety and extensive medical history including hyperlipidemia, type II DM with peripheral neuropathy, CAD s/p MIDCAB (2018)/VERONICA, HFrEF, AICD (2/20), pulmonary HTN, and recurrent R knee joint infections s/p total knee replacement in 2019, who presents with adjustment symptoms and mild cognitive impairment in setting of recurrent hospitalization for management of knee infection and pain.    Pt presents as pleasant, talkative, receptive to emotional support, endorsing significant difficulty adjusting to life changes in the last year due to his multiple acute medical conditions and hospitalizations that have led to early shelter from his job and limited time at home with his family. He displays notable anxious thinking, reactive mood lability, mild disinhibition, and mild cognitive deficits (word finding, short-term memory, ?coordination difficulties), suggestive of a possible developing neurocognitive disorder as well as an adjustment disorder. No evidence of a major depressive episode, jim, psychosis or acute delirium though certainly at risk for delirium given cognitive deficits, medical risk factors, and history of acute delirium in hospital. No acute safety concerns.  Primarily recommend outpatient psychotherapy if pt is agreeable/able to attend. Would also consider re-initiation of an SSRI for anxiety and depressive symptoms given distressing ruminations and mood swings and previous reported good response to paroxetine (though would choose different SSRI in older patient with cognitive deficits). Could also consider upward titration of Neurontin for anxiety and insomnia.    RECOMMENDATIONS  -no need for sitter or inpatient psychiatric care  -support provided today  -consider starting sertraline 25mg for depression/anxiety  -consider increasing dose of gabapentin, such as nighttime dose to 200mg, to target anxiety and insomnia  -check TSH, B12, folate, RPR if not recently obtained (for reversible contributors to cognitive decline)  -consider referral to outpatient neurocognitive testing  -CL will remain available to provide psychotherapy in hospital and referral to outpatient psychiatric care  -no psychiatric barrier to discharge

## 2020-09-09 LAB
ANION GAP SERPL CALC-SCNC: 9 MMOL/L — SIGNIFICANT CHANGE UP (ref 5–17)
BUN SERPL-MCNC: 23 MG/DL — SIGNIFICANT CHANGE UP (ref 7–23)
CALCIUM SERPL-MCNC: 8.8 MG/DL — SIGNIFICANT CHANGE UP (ref 8.4–10.5)
CHLORIDE SERPL-SCNC: 105 MMOL/L — SIGNIFICANT CHANGE UP (ref 96–108)
CO2 SERPL-SCNC: 22 MMOL/L — SIGNIFICANT CHANGE UP (ref 22–31)
CREAT SERPL-MCNC: 0.84 MG/DL — SIGNIFICANT CHANGE UP (ref 0.5–1.3)
CRP SERPL-MCNC: 2.5 MG/DL — HIGH (ref 0–0.4)
ERYTHROCYTE [SEDIMENTATION RATE] IN BLOOD: 14 MM/HR — SIGNIFICANT CHANGE UP
GLUCOSE BLDC GLUCOMTR-MCNC: 123 MG/DL — HIGH (ref 70–99)
GLUCOSE BLDC GLUCOMTR-MCNC: 132 MG/DL — HIGH (ref 70–99)
GLUCOSE BLDC GLUCOMTR-MCNC: 143 MG/DL — HIGH (ref 70–99)
GLUCOSE BLDC GLUCOMTR-MCNC: 180 MG/DL — HIGH (ref 70–99)
GLUCOSE SERPL-MCNC: 101 MG/DL — HIGH (ref 70–99)
HCT VFR BLD CALC: 28 % — LOW (ref 39–50)
HGB BLD-MCNC: 7.8 G/DL — LOW (ref 13–17)
MCHC RBC-ENTMCNC: 19.3 PG — LOW (ref 27–34)
MCHC RBC-ENTMCNC: 27.9 GM/DL — LOW (ref 32–36)
MCV RBC AUTO: 69.3 FL — LOW (ref 80–100)
NRBC # BLD: 0 /100 WBCS — SIGNIFICANT CHANGE UP (ref 0–0)
PLATELET # BLD AUTO: 381 K/UL — SIGNIFICANT CHANGE UP (ref 150–400)
POTASSIUM SERPL-MCNC: 4.8 MMOL/L — SIGNIFICANT CHANGE UP (ref 3.5–5.3)
POTASSIUM SERPL-SCNC: 4.8 MMOL/L — SIGNIFICANT CHANGE UP (ref 3.5–5.3)
RBC # BLD: 4.04 M/UL — LOW (ref 4.2–5.8)
RBC # FLD: 19.6 % — HIGH (ref 10.3–14.5)
SODIUM SERPL-SCNC: 136 MMOL/L — SIGNIFICANT CHANGE UP (ref 135–145)
WBC # BLD: 7.97 K/UL — SIGNIFICANT CHANGE UP (ref 3.8–10.5)
WBC # FLD AUTO: 7.97 K/UL — SIGNIFICANT CHANGE UP (ref 3.8–10.5)

## 2020-09-09 PROCEDURE — 99232 SBSQ HOSP IP/OBS MODERATE 35: CPT | Mod: GC

## 2020-09-09 PROCEDURE — 99233 SBSQ HOSP IP/OBS HIGH 50: CPT

## 2020-09-09 PROCEDURE — 99232 SBSQ HOSP IP/OBS MODERATE 35: CPT

## 2020-09-09 RX ORDER — SODIUM CHLORIDE 9 MG/ML
500 INJECTION INTRAMUSCULAR; INTRAVENOUS; SUBCUTANEOUS ONCE
Refills: 0 | Status: COMPLETED | OUTPATIENT
Start: 2020-09-09 | End: 2020-09-09

## 2020-09-09 RX ORDER — GABAPENTIN 400 MG/1
100 CAPSULE ORAL
Refills: 0 | Status: DISCONTINUED | OUTPATIENT
Start: 2020-09-09 | End: 2020-09-18

## 2020-09-09 RX ORDER — GABAPENTIN 400 MG/1
200 CAPSULE ORAL THREE TIMES A DAY
Refills: 0 | Status: DISCONTINUED | OUTPATIENT
Start: 2020-09-09 | End: 2020-09-09

## 2020-09-09 RX ORDER — GABAPENTIN 400 MG/1
200 CAPSULE ORAL AT BEDTIME
Refills: 0 | Status: DISCONTINUED | OUTPATIENT
Start: 2020-09-09 | End: 2020-09-18

## 2020-09-09 RX ORDER — SERTRALINE 25 MG/1
25 TABLET, FILM COATED ORAL DAILY
Refills: 0 | Status: DISCONTINUED | OUTPATIENT
Start: 2020-09-09 | End: 2020-09-18

## 2020-09-09 RX ADMIN — MORPHINE SULFATE 30 MILLIGRAM(S): 50 CAPSULE, EXTENDED RELEASE ORAL at 17:00

## 2020-09-09 RX ADMIN — CEFEPIME 100 MILLIGRAM(S): 1 INJECTION, POWDER, FOR SOLUTION INTRAMUSCULAR; INTRAVENOUS at 07:07

## 2020-09-09 RX ADMIN — Medication 81 MILLIGRAM(S): at 17:54

## 2020-09-09 RX ADMIN — GABAPENTIN 200 MILLIGRAM(S): 400 CAPSULE ORAL at 22:03

## 2020-09-09 RX ADMIN — Medication 2: at 22:03

## 2020-09-09 RX ADMIN — Medication 650 MILLIGRAM(S): at 07:15

## 2020-09-09 RX ADMIN — MORPHINE SULFATE 2 MILLIGRAM(S): 50 CAPSULE, EXTENDED RELEASE ORAL at 11:11

## 2020-09-09 RX ADMIN — Medication 0.12 MILLIGRAM(S): at 07:40

## 2020-09-09 RX ADMIN — CEFEPIME 100 MILLIGRAM(S): 1 INJECTION, POWDER, FOR SOLUTION INTRAMUSCULAR; INTRAVENOUS at 23:34

## 2020-09-09 RX ADMIN — ROSUVASTATIN CALCIUM 10 MILLIGRAM(S): 5 TABLET ORAL at 22:03

## 2020-09-09 RX ADMIN — CEFEPIME 100 MILLIGRAM(S): 1 INJECTION, POWDER, FOR SOLUTION INTRAMUSCULAR; INTRAVENOUS at 16:57

## 2020-09-09 RX ADMIN — Medication 650 MILLIGRAM(S): at 11:45

## 2020-09-09 RX ADMIN — MORPHINE SULFATE 30 MILLIGRAM(S): 50 CAPSULE, EXTENDED RELEASE ORAL at 00:58

## 2020-09-09 RX ADMIN — MORPHINE SULFATE 30 MILLIGRAM(S): 50 CAPSULE, EXTENDED RELEASE ORAL at 16:23

## 2020-09-09 RX ADMIN — SODIUM CHLORIDE 500 MILLILITER(S): 9 INJECTION INTRAMUSCULAR; INTRAVENOUS; SUBCUTANEOUS at 07:56

## 2020-09-09 RX ADMIN — INSULIN GLARGINE 20 UNIT(S): 100 INJECTION, SOLUTION SUBCUTANEOUS at 22:03

## 2020-09-09 RX ADMIN — Medication 5 MILLIGRAM(S): at 22:09

## 2020-09-09 RX ADMIN — MORPHINE SULFATE 30 MILLIGRAM(S): 50 CAPSULE, EXTENDED RELEASE ORAL at 00:00

## 2020-09-09 RX ADMIN — SERTRALINE 25 MILLIGRAM(S): 25 TABLET, FILM COATED ORAL at 11:20

## 2020-09-09 RX ADMIN — DAPTOMYCIN 127 MILLIGRAM(S): 500 INJECTION, POWDER, LYOPHILIZED, FOR SOLUTION INTRAVENOUS at 01:59

## 2020-09-09 RX ADMIN — Medication 650 MILLIGRAM(S): at 00:48

## 2020-09-09 RX ADMIN — Medication 650 MILLIGRAM(S): at 17:54

## 2020-09-09 RX ADMIN — MORPHINE SULFATE 2 MILLIGRAM(S): 50 CAPSULE, EXTENDED RELEASE ORAL at 11:30

## 2020-09-09 RX ADMIN — Medication 650 MILLIGRAM(S): at 01:20

## 2020-09-09 RX ADMIN — GABAPENTIN 100 MILLIGRAM(S): 400 CAPSULE ORAL at 14:37

## 2020-09-09 RX ADMIN — Medication 650 MILLIGRAM(S): at 23:34

## 2020-09-09 RX ADMIN — Medication 650 MILLIGRAM(S): at 00:19

## 2020-09-09 RX ADMIN — Medication 8 UNIT(S): at 17:48

## 2020-09-09 RX ADMIN — GABAPENTIN 100 MILLIGRAM(S): 400 CAPSULE ORAL at 07:09

## 2020-09-09 RX ADMIN — Medication 650 MILLIGRAM(S): at 07:07

## 2020-09-09 RX ADMIN — Medication 81 MILLIGRAM(S): at 07:07

## 2020-09-09 RX ADMIN — Medication 650 MILLIGRAM(S): at 11:15

## 2020-09-09 RX ADMIN — Medication 5 MILLIGRAM(S): at 05:22

## 2020-09-09 RX ADMIN — SPIRONOLACTONE 25 MILLIGRAM(S): 25 TABLET, FILM COATED ORAL at 07:07

## 2020-09-09 RX ADMIN — CEFEPIME 100 MILLIGRAM(S): 1 INJECTION, POWDER, FOR SOLUTION INTRAMUSCULAR; INTRAVENOUS at 00:48

## 2020-09-09 RX ADMIN — PRASUGREL 10 MILLIGRAM(S): 5 TABLET, FILM COATED ORAL at 11:15

## 2020-09-09 NOTE — PROGRESS NOTE BEHAVIORAL HEALTH - OTHER
mildly impaired some mild word-finding difficulty fidgeting, frequently putting on and removing watch mildly impaired, noted with restless and impulsive movements when moving items around on bed, trying to put on watch moving all extremities, motor exam deferred due to COVID precautions stable posture talkative, overinclusive but not rapid or pressured mildly labile, less than prior. not tearful at times tangential focused on multiple recent stressors and losses, especially health-related decline

## 2020-09-09 NOTE — PROGRESS NOTE BEHAVIORAL HEALTH - NS ED BHA MSE SPEECH RATE
CLINICAL NUTRITION SERVICES - REASSESSMENT NOTE      Recommendations Ordered by Registered Dietitian (RD):   Increase TF Peptamen Intense VHP now to 30 mL/hr;  Increase by 15 mL every 24 hrs to goal 60 mL/hr = 1440 kcals (12 kcal/kg), 132 gm pro (2.5 gm/kg), 1210 mL H20, 7 gm fiber, 109 gm CHO    Increase H20 flushes to 100 mL every 4 hrs   Malnutrition:  (1/2)  % Weight Loss:  None noted  % Intake:  </= 50% for >/= 5 days (severe malnutrition)  Subcutaneous Fat Loss:  None observed  Muscle Loss:  None observed  Fluid Retention:  Mild 2+ generalized      Malnutrition Diagnosis: Non-Severe malnutrition  In Context of:  Acute illness or injury       EVALUATION OF PROGRESS TOWARD GOALS   Diet:  NPO    Nutrition Support:  Trickle TF was started on 1/2 and continues as below:    Nutrition Support Enteral:  Type of Feeding Tube:  Jejunostomy  Enteral Frequency:  Continuous  Enteral Regimen:  Peptamen Intense VHP at 15 mL/hr  Total Enteral Provisions:  360 kcal (3 kcal/kg and 27% energy needs), 33 g protein (0.6 g/kg and 30% protein needs), 27 g CHO, 1 g fiber, 302 mL H2O  Free Water Flush: 60 mL every 4 hrs    Propofol off 1/4.  D5  mL/hr off today.    Intake/Tolerance:    Stool:  x1 yesterday (large) and x1 today (medium).  Na 143 (NL, improved)  Phos 2.0 (L) --> replaced  BGM:  111-146  Range - Insulin drip  I/O:  4282/3550.   mL, Drains 810 mL.  Wt:  135 kg (up 13 kg from lowest wt).  Pt with 2+ generalized edema.      ASSESSED NUTRITION NEEDS PER APPROVED PRACTICE GUIDELINES:     Dosing Weight 122 kg (energy) and 54.5 kg (protein)  Estimated Energy Needs: 1565-6282 kcals (11-14 Kcal/Kg)  Justification: obese  Estimated Protein Needs: 110-135 grams protein (2-2.5 g pro/Kg)  Justification: hypercatabolism with acute illness and obesity guidelines       NEW FINDINGS:   Yesterday, surgery had indicated it was acceptable to increase TF rate.   1/6:  Extubated      Previous Goals (1/2):   Patient will achieve  goal TF within the next 2-3 days    Evaluation: Not met    Previous Nutrition Diagnosis (1/2):   Inadequate protein-energy intake related to NPO, plan to start trickle TF today as evidenced by meeting 0% protein and 40% energy needs from Propofol and D5 containing IVF   Evaluation: Improving      CURRENT NUTRITION DIAGNOSIS  Inadequate enteral nutrition infusion related to low TF rate and Propofol and D5 IVF discontinued as evidenced by TF meeting only 1/4-1/3 estimated needs    INTERVENTIONS  Recommendations / Nutrition Prescription  Increase TF Peptamen Intense VHP now to 30 mL/hr;  Increase by 15 mL every 24 hrs to goal 60 mL/hr = 1440 kcals (12 kcal/kg), 132 gm pro (2.5 gm/kg), 1210 mL H20, 7 gm fiber, 109 gm CHO    Increase H20 flushes to 100 mL every 4 hrs      Implementation  Collaboration and Referral of Nutrition care - Pt was discussed during ICU interdisciplinary rounds.  Verified with Dr. Jackson the plan to increase TF rate today.  He advised the above H20 flush increase.  EN Schedule and Free H20 flushes - Orders entered as above    Goals  TF goal Peptamen Intense VHP at 60 mL/hr will meet % estimated needs      MONITORING AND EVALUATION:  Progress towards goals will be monitored and evaluated per protocol and Practice Guidelines    Ekta Patel, RD, LD, CNSC   Other/Normal

## 2020-09-09 NOTE — PROGRESS NOTE ADULT - SUBJECTIVE AND OBJECTIVE BOX
Interval Events: Reviewed  Patient seen and examined at bedside.    Patient is a 65y old  Male who presents with a chief complaint of R Knee Swelling (09 Sep 2020 18:38)    he is doign OK  PAST MEDICAL & SURGICAL HISTORY:  Diabetic neuropathy  STEMI (ST elevation myocardial infarction)  Diverticulitis  MRSA bacteremia  History of celiac disease  CHF (congestive heart failure): EF ~ 25%  HTN (hypertension)  Diabetes: on insulin pump  Blood clot due to device, implant, or graft: was on blood thinners  HLD (hyperlipidemia)  Osteoarthritis  Atherosclerosis of coronary artery: CAD (coronary artery disease)  Status post percutaneous transluminal coronary angioplasty: in 2012  History of open reduction and internal fixation (ORIF) procedure  Surgery, elective: Right shoulder  Surgery, elective: right knee wound debridement  S/P TKR (total knee replacement), right: with infection Mrsa   per pt he was cleared from MRSA infection  S/P CABG x 1: 2018  Stented coronary artery: 10/18 heart attack  INFERIOR WALL MI  Other postprocedural status: Fixation hardware in foot LEFT      MEDICATIONS:  Pulmonary:    Antimicrobials:  cefepime   IVPB      cefepime   IVPB 2000 milliGRAM(s) IV Intermittent every 8 hours  DAPTOmycin IVPB 675 milliGRAM(s) IV Intermittent every 24 hours  rifAMPin 300 milliGRAM(s) Oral every 12 hours    Anticoagulants:  aspirin  chewable 81 milliGRAM(s) Oral two times a day  prasugrel 10 milliGRAM(s) Oral daily    Cardiac:  digoxin     Tablet 0.125 milliGRAM(s) Oral daily  metoprolol succinate ER 25 milliGRAM(s) Oral daily  spironolactone 25 milliGRAM(s) Oral daily  tamsulosin 0.4 milliGRAM(s) Oral at bedtime      Allergies    ACE inhibitors (Hives)  carvedilol (Other)  enalapril (Hives)  Entresto (Other)    Intolerances        Vital Signs Last 24 Hrs  T(C): 36.3 (09 Sep 2020 16:43), Max: 36.9 (09 Sep 2020 12:39)  T(F): 97.3 (09 Sep 2020 16:43), Max: 98.4 (09 Sep 2020 12:39)  HR: 80 (09 Sep 2020 16:50) (73 - 100)  BP: 117/69 (09 Sep 2020 16:50) (92/49 - 123/72)  BP(mean): --  RR: 19 (09 Sep 2020 16:43) (17 - 19)  SpO2: 99% (09 Sep 2020 16:50) (98% - 100%)    09-08 @ 07:01  -  09-09 @ 07:00  --------------------------------------------------------  IN: 150 mL / OUT: 400 mL / NET: -250 mL    09-09 @ 07:01  -  09-09 @ 20:24  --------------------------------------------------------  IN: 500 mL / OUT: 800 mL / NET: -300 mL          Review of Systems:   •	General: negative  •	Skin/Breast: negative  •	Ophthalmologic: negative  •	ENMT: negative  •	Respiratory and Thorax: negative  •	Cardiovascular: negative  •	Gastrointestinal: negative  •	Genitourinary: negative  •	Musculoskeletal: negative  •	Neurological: negative  •	Psychiatric: negative  •	Hematology/Lymphatics: negative  •	Endocrine: negative  •	Allergic/Immunologic: negative    Physical Exam:   • Constitutional:	Well-developed, well nourished  • Eyes:	EOMI; PERRL; no drainage or redness  • ENMT:	No oral lesions; no gross abnormalities  • Neck	No bruits; no thyromegaly or nodules  • Breasts:	not examined  • Back:	No deformity or limitation of movement  • Respiratory:	Breath Sounds equal & clear to percussion & auscultation, no accessory muscle use  • Cardiovascular:	Regular rate & rhythm, normal S1, S2; no murmurs, gallops or rubs; no S3, S4  • Gastrointestinal:	Soft, non-tender, no hepatosplenomegaly, normal bowel sounds  • Genitourinary:	not examined  • Rectal: not examined  • Extremities:	No cyanosis, clubbing or edema  • Vascular:	Equal and normal pulses (carotid, femoral, dorsalis pedis)  • Neurologica:l	not examined  • Skin:	No lesions; no rash  • Lymph Nodes:	No lymphadedenopathy  • Musculoskeletal:	No joint pain, swelling or deformity; no limitation of movement        LABS:      CBC Full  -  ( 09 Sep 2020 07:20 )  WBC Count : 7.97 K/uL  RBC Count : 4.04 M/uL  Hemoglobin : 7.8 g/dL  Hematocrit : 28.0 %  Platelet Count - Automated : 381 K/uL  Mean Cell Volume : 69.3 fl  Mean Cell Hemoglobin : 19.3 pg  Mean Cell Hemoglobin Concentration : 27.9 gm/dL  Auto Neutrophil # : x  Auto Lymphocyte # : x  Auto Monocyte # : x  Auto Eosinophil # : x  Auto Basophil # : x  Auto Neutrophil % : x  Auto Lymphocyte % : x  Auto Monocyte % : x  Auto Eosinophil % : x  Auto Basophil % : x    09-09    136  |  105  |  23  ----------------------------<  101<H>  4.8   |  22  |  0.84    Ca    8.8      09 Sep 2020 07:20                          RADIOLOGY & ADDITIONAL STUDIES (The following images were personally reviewed):  Olja:                                     No  Urine output:                       adequate  DVT prophylaxis:                 Yes  Flattus:                                  Yes  Bowel movement:              No

## 2020-09-09 NOTE — PROGRESS NOTE BEHAVIORAL HEALTH - NSBHCONSULTFOLLOWAFTERCARE_PSY_A_CORE FT
Recommend outpatient psychiatric f/u for medication management and psychotherapy. Pt can contact his insurance company for in-network options. Other options include:    [facility]  [place]    [address], [place] [id number] [phone]     [place]    [address] [place] [phone]       [facility]  [email or web address]  [address]  [place], [place] [id number] [phone] ([phone] for intakes)    [facility]  [email or web address]  [address] [id number] [phone]    as well as resource (144)[facility] (716)[employer] which is a  24 hour mental health hotline that can identify in network clinics, support groups, and can offer emergency assistance

## 2020-09-09 NOTE — PROGRESS NOTE BEHAVIORAL HEALTH - RISK ASSESSMENT
unchanged -at low acute and chronic risk for suicide given absence of current or past SI, future-orientation, engagement in treatment, help seeking behaviors for acute issues (medical and psychiatric), with strong social support

## 2020-09-09 NOTE — PROGRESS NOTE BEHAVIORAL HEALTH - NSBHFUPINTERVALHXFT_PSY_A_CORE
Pt is a 64yo  man, retired from [employer], domiciled with wife in [place], [place], with a self-reported psychiatric history of anxiety and medical history of hyperlipidemia, type II DM with peripheral neuropathy, CAD s/p MIDCAB (2018)/VERONICA, HFrEF, AICD (2/20), pulmonary HTN, recurrent R knee PPJI for which he was last admitted about 3 weeks ago, who presented to the ED from home on 9/1 with recurrent right knee pain.  Pt has had multiple medical complications following total knee replacement in 10/2019 including recurrent infection, hip fracture, and CHF exacerbation, leading to extensive antibiotic exposure and multiple prolonged hospitalizations. Pt now admitted for management of persistent MRSA knee joint infection. Psychiatry consulted for assessment of depressive symptoms and difficulty coping with multiple prolonged hospitalizations, seen for f/u today. Started on increased dose of Neurontin QHS for anxiety/insomnia last night and low dose Zoloft for anxiety and depressive symptoms (not yet received).    On evaluation this morning, pt found awake, alert, meeting with  prior to MD evaluation. He reported focus on need for a scan this morning due to possible ?gallbladder problem, with pain in side and GI upset from prep last night disrupting sleep. He spoke at length about multiple medical procedures he has endured, with hope that he will be able to recover and return to his prior life. He endorsed ongoing anxiety about his loss of control over his life and periods of depressed mood as well as periods of melissa (such as when speaking with his wife). He was agreeable to trial of increased dose of Neurontin and remains agreeable to trial of a new SSRI. We discussed use of sertraline, common side effects, risks/benefits/alternatives, expected onset of action and importance of outpatient f/u. We also discussed plan to provide referral to psychotherapy. No reported SI/HI/AVH.

## 2020-09-09 NOTE — PROGRESS NOTE ADULT - SUBJECTIVE AND OBJECTIVE BOX
INTERVAL HISTORY:  Walked w/o dyspnea. 	  Chart, PMH medications and allergies reviewed    MEDICATIONS:  MEDICATIONS  (STANDING):  acetaminophen   Tablet .. 650 milliGRAM(s) Oral every 6 hours  aspirin  chewable 81 milliGRAM(s) Oral two times a day  cefepime   IVPB      cefepime   IVPB 2000 milliGRAM(s) IV Intermittent every 8 hours  DAPTOmycin IVPB 675 milliGRAM(s) IV Intermittent every 24 hours  dextrose 5%. 1000 milliLiter(s) (50 mL/Hr) IV Continuous <Continuous>  dextrose 50% Injectable 12.5 Gram(s) IV Push once  dextrose 50% Injectable 25 Gram(s) IV Push once  dextrose 50% Injectable 25 Gram(s) IV Push once  digoxin     Tablet 0.125 milliGRAM(s) Oral daily  gabapentin 100 milliGRAM(s) Oral <User Schedule>  gabapentin 200 milliGRAM(s) Oral at bedtime  insulin glargine Injectable (LANTUS) 20 Unit(s) SubCutaneous at bedtime  insulin lispro (HumaLOG) corrective regimen sliding scale   SubCutaneous Before meals and at bedtime  insulin lispro Injectable (HumaLOG) 8 Unit(s) SubCutaneous three times a day before meals  lactated ringers. 1000 milliLiter(s) (50 mL/Hr) IV Continuous <Continuous>  lidocaine 2% Injectable 10 milliLiter(s) Local Injection once  metoprolol succinate ER 25 milliGRAM(s) Oral daily  polyethylene glycol 3350 17 Gram(s) Oral daily  prasugrel 10 milliGRAM(s) Oral daily  rifAMPin 300 milliGRAM(s) Oral every 12 hours  rosuvastatin 10 milliGRAM(s) Oral at bedtime  sertraline 25 milliGRAM(s) Oral daily  spironolactone 25 milliGRAM(s) Oral daily  tamsulosin 0.4 milliGRAM(s) Oral at bedtime      REVIEW OF SYSTEMS:  CONSTITUTIONAL: No fever, no weight loss, no fatigue  PULMONARY: No cough, no wheezing, chills no hemoptysis; No Shortness of Breath  CARDIOVASCULAR: No chest pain, no palpitations, no syncope, dizziness, no leg swelling  GASTROINTESTINAL: No abdominal pain. No nausea, no  vomiting, no hematemesis; No diarrhea, no constipation. No melena, no hematochezia.  GENITOURINARY: No dysuria, no frequency, no hematuria, no incontinence  SKIN: No itching, no burning, no rashes, no lesions     PHYSICAL EXAM:  T(C): 36.4 (09-09-20 @ 07:04), Max: 36.8 (09-08-20 @ 12:07)  HR: 75 (09-09-20 @ 07:42) (73 - 87)  BP: 98/53 (09-09-20 @ 07:42) (92/49 - 120/70)  RR: 18 (09-09-20 @ 07:04) (17 - 18)  SpO2: 100% (09-09-20 @ 07:04) (99% - 100%)  Wt(kg): --  I&O's Summary    08 Sep 2020 07:01  -  09 Sep 2020 07:00  --------------------------------------------------------  IN: 150 mL / OUT: 400 mL / NET: -250 mL        Appearance:  No deformities, normal appearance	  HEENT:   Normal oral mucosa, PERRL, EOMI	  Neck: No jvd , no masses  Lymphatic: No  lymphadenopathy  Pulmonary: Lungs clear to auscultation and percussion  Cardiovascular: Normal S1 S2, No JVD, No murmur, L tr edema	  Abdomen:  Soft, Non-tender, + BS  Skin: No rashes, No ecchymoses	  Extremities:  No clubbing or cyanosis  MSK: Normal range of motion, no joint swelling    LABS:		  CARDIAC MARKERS:                         7.8    7.97  )-----------( 381      ( 09 Sep 2020 07:20 )             28.0   09-09    136  |  105  |  23  ----------------------------<  101<H>  4.8   |  22  |  0.84    Ca    8.8      09 Sep 2020 07:20      proBNP:  Lipid Profile:  HgA1c:  TSH:      Culture - Fungal, Tissue (collected 09-04-20 @ 22:31)  Source: .Tissue (5)Right Hip Tissue  Preliminary Report (09-05-20 @ 15:09):    Testing in progress    Culture - Acid Fast - Tissue w/Smear (collected 09-04-20 @ 22:31)  Source: .Tissue (5)Right Hip Tissue    Culture - Acid Fast - Body Fluid w/Smear (collected 09-04-20 @ 22:31)  Source: .Body Fluid (4)Right Hip Curratage    Culture - Fungal, Body Fluid (collected 09-04-20 @ 22:31)  Source: .Body Fluid (4)Right Hip Curratage  Preliminary Report (09-05-20 @ 15:03):    Testing in progress    Culture - Fungal, Body Fluid (collected 09-04-20 @ 22:31)  Source: .Body Fluid (3)Right Hip Aspirated Fluid  Preliminary Report (09-05-20 @ 15:03):    Testing in progress    Culture - Acid Fast - Body Fluid w/Smear (collected 09-04-20 @ 22:31)  Source: .Body Fluid (3)Right Hip Aspirated Fluid    Culture - Fungal, Other (collected 09-04-20 @ 22:31)  Source: .Other (2)Right Hip Log Screw  Preliminary Report (09-05-20 @ 15:20):    Testing in progress    Culture - Acid Fast - Other w/Smear (collected 09-04-20 @ 22:31)  Source: .Other (2)Right Hip Log Screw    Culture - Acid Fast - Other w/Smear (collected 09-04-20 @ 22:31)  Source: .Other (1)Right Hip Interlocking Screw    Culture - Fungal, Other (collected 09-04-20 @ 22:31)  Source: .Other (1)Right Hip Interlocking Screw  Preliminary Report (09-05-20 @ 15:09):    Testing in progress    Culture - Surgical Swab (collected 09-04-20 @ 19:06)  Source: .Surgical Swab abdominal wall abscess  Gram Stain (09-04-20 @ 20:04):    No organisms seen    Moderate WBC's  Final Report (09-06-20 @ 10:12):    Rare Pseudomonas aeruginosa    Rare Enterococcus faecium (vancomycin resistant)    Floor previously notified.    See previous culture susceptibility  Organism: Pseudomonas aeruginosa (09-06-20 @ 10:12)  Organism: Pseudomonas aeruginosa (09-06-20 @ 10:12)      -  Aztreonam: S <=4      -  Cefepime: S <=2      -  Ciprofloxacin: S <=0.25      -  Gentamicin: S 4      -  Piperacillin/Tazobactam: S <=8      -  Tobramycin: S <=2      Method Type: YOLIE    Culture - Body Fluid with Gram Stain (collected 09-04-20 @ 19:06)  Source: Abdominal Fl Abdominal Fluid  Gram Stain (09-04-20 @ 20:01):    No organisms seen    Moderate WBC's  Final Report (09-06-20 @ 10:05):    Few Pseudomonas aeruginosa    Rare Enterococcus faecium (vancomycin resistant)    Result called to and read back byCuauhtemoc MAK RN  09/06/2020 10:05:10  Organism: Pseudomonas aeruginosa  Enterococcus faecium (vancomycin resistant) (09-06-20 @ 10:05)  Organism: Enterococcus faecium (vancomycin resistant) (09-06-20 @ 10:05)      -  Ampicillin: R >8 Predicts results to ampicillin/sulbactam, amoxacillin-clavulanate and  piperacillin-tazobactam.      -  Levofloxacin: R >4      -  Linezolid: S 1      -  Vancomycin: R >16      Method Type: YOLIE  Organism: Pseudomonas aeruginosa (09-06-20 @ 10:05)      -  Aztreonam: S <=4      -  Cefepime: S <=2      -  Ciprofloxacin: S <=0.25      -  Gentamicin: S 4      -  Piperacillin/Tazobactam: S <=8      -  Tobramycin: S <=2      Method Type: YOLIE    Culture - Tissue with Gram Stain (collected 09-04-20 @ 15:30)  Source: .Tissue (2)Right Hip Log Screw  Gram Stain (09-04-20 @ 15:31):    Test cannot be performed on this type of specimen.  Final Report (09-08-20 @ 09:01):    No growth    Culture - Body Fluid with Gram Stain (collected 09-04-20 @ 15:18)  Source: .Body Fluid (4)Right Hip Curratage  Gram Stain (09-04-20 @ 16:39):    No organisms seen    Few WBC's  Preliminary Report (09-06-20 @ 09:38):    No growth to date.    Culture - Body Fluid with Gram Stain (collected 09-04-20 @ 15:17)  Source: .Body Fluid (3)Right Hip Aspirated Fluid  Gram Stain (09-04-20 @ 16:14):    No organisms seen    Rare WBC's  Preliminary Report (09-05-20 @ 09:00):    No growth to date    Culture - Tissue with Gram Stain (collected 09-04-20 @ 15:14)  Source: .Tissue (1)Right Hip Interlocking Screw  Gram Stain (09-04-20 @ 15:32):    Test cannot be performed on this type of specimen.  Final Report (09-08-20 @ 08:59):    No growth    Culture - Tissue with Gram Stain (collected 09-04-20 @ 15:11)  Source: .Tissue (5)Right Hip Tissue  Gram Stain (09-04-20 @ 16:10):    No organisms seen    No WBC's seen.  Final Report (09-08-20 @ 09:49):    No growth      TELEMETRY: NSR a sense v pace	      QTC     ECG:  	   QTC         RADIOLOGY:   DIAGNOSTIC TESTING: [ ] Echocardiogram: [ ]  Catheterization: [ ] Stress Test:      ASSESSMENT/PLAN: 	  Severely reduced ejection fraction-The patient is ambulating without dyspnea. His chest is clear. He tolerated saline bolus.  Continue metoprolol digoxin and spironolactone.    Coronary artery disease-the patient denies chest discomfort. His EKG is uninterpretable secondary to the pacemaker. He was revascularized less than two years ago. There is no clinical evidence for ischemia. On rosuvastatin. The patient is history of stent thrombosis. He had a stent placed less than two years ago. continue prasugrel and aspirin.     Defibrillator-no recent events.    Septic knee- as per ID and orthopedics.

## 2020-09-09 NOTE — PROGRESS NOTE ADULT - SUBJECTIVE AND OBJECTIVE BOX
Orthopaedic Surgery Progress Note    S/p R knee antibiotic spacer 07/22 who presented with R knee swelling and RUQ wound drainage on 09/01/2020    Subjective:     Patient seen and examined. Patient comfortable. Feels like his abdomen wound is doing better. Is concerned about his left leg wound as it seems more painful today. Is very grateful for the psychiatrist assessment yesterday. Denies chest pain, shortness of breath, nausea/vomiting, numbness/tingling.    Objective:    Vital Signs Last 24 Hrs  T(C): 36.4 (09-09-20 @ 07:04), Max: 36.4 (09-09-20 @ 07:04)  T(F): 97.6 (09-09-20 @ 07:04), Max: 97.6 (09-09-20 @ 07:04)  HR: 75 (09-09-20 @ 07:42) (75 - 77)  BP: 98/53 (09-09-20 @ 07:42) (92/49 - 98/53)  RR: 18 (09-09-20 @ 07:04) (18 - 18)  SpO2: 100% (09-09-20 @ 07:04) (100% - 100%)  AVSS    PE:  General: Patient alert and oriented, NAD  RLE: R hip dressing c/d/i. R knee with 2cm area of granulation tissue and fibrinous tissue w/o tenderness or erythema, proximal and distal wound edges are healed well,   LLE: Anterior tibia with skin graft s/p debridement by plastic surgery, no erythema  Pulses: 2+ DP BLE  Sensation: SILT BLE   Motor: EHL/FHL/TA/GS 5/5 BLE                          7.8    7.97  )-----------( 381      ( 09 Sep 2020 07:20 )             28.0   09 Sep 2020 07:20    136    |  105    |  23     ----------------------------<  101    4.8     |  22     |  0.84     Ca    8.8        09 Sep 2020 07:20        A/P: 65yMale s/p R knee antibiotic spacer 07/22 who presented with R knee swelling and RUQ wound drainage on 09/01/2020  1. Pain control as needed. Appreciate pain management recs.   2. DVT prophylaxis: ASA, Effient   3. PT, weight-bearing status: WBAT BLE   4. Appreciate Gen Surg recs for RUQ wound. Pt feels that wound is improving. HIDA scan inconclusive. Gallium scan today.   5. Continue antibiotics. Appreciate ID recs.   6. Left leg wound managed by plastics and vascular. Wound care consulted. Vascular to see patient today. Plastics contacted. Will f/u.   7. Continue sertraline and gabapentin per psych. Appreciate recs.  8. Dispo: pending improvement     Ortho Pager [phone]

## 2020-09-09 NOTE — PROGRESS NOTE ADULT - SUBJECTIVE AND OBJECTIVE BOX
Pain Management Progress Note - [facility] [phone]      HPI: Patient seen and examined today. Patient with a history of knee pain, diabetic neuropathy, CHF, HTN, MRSA bacteremia, diverticulitis, COPD, hyperkalemia, chronic systolic CHF, osteoarthritis, s/p R Revision TKA and antibiotic spacer by Dr. Castro on 7/22, now presenting with R knee pain and swelling x3 days. Patient reports R hip and R knee pain, pain managed with Morphine PO. Patient Axox3, denies any itchiness or secondary side effects with Morphine PO. Reviewed pain medication regimen with patient at bedside.       Pain is _x__ sharp ____dull ___burning _x__achy ___ Intensity: ____ mild _x__mod x__severe     Location _x___surgical site ____cervical _____lumbar ____abd ____upper ext____lower ext    Worse with _x___activity _x___movement _____physical therapy___ Rest    Improved with _x___medication __x__rest ____physical therapy      HYDROmorphone  Injectable  spironolactone Oral Tab/Cap - Peds  acetaminophen   Tablet ..  aspirin enteric coated  celecoxib  oxyCODONE  ER Tablet  oxyCODONE    IR  tamsulosin Oral Tab/Cap - Peds  DULoxetine  atorvastatin  prasugrel  rifAMPin  metoprolol succinate ER  silver sulfADIAZINE 1% Cream  pantoprazole    Tablet  vancomycin  IVPB  spironolactone  tamsulosin  insulin lispro (HumaLOG) corrective regimen sliding scale  dextrose 5%.  dextrose 40% Gel  dextrose 50% Injectable  dextrose 50% Injectable  dextrose 50% Injectable  glucagon  Injectable  HYDROmorphone  Injectable  HYDROmorphone   Tablet  HYDROmorphone   Tablet  HYDROmorphone  Injectable  gabapentin  cyclobenzaprine  sodium chloride 0.9% lock flush  chlorhexidine 4% Liquid  diphenhydrAMINE  spironolactone  povidone iodine 5% Nasal Swab  chlorhexidine 2% Cloths  lactated ringers.  rosuvastatin  oxyCODONE    IR  oxyCODONE    IR  vancomycin  IVPB  vancomycin  IVPB  vancomycin  IVPB  morphine  - Injectable  oxyCODONE    IR  oxyCODONE    IR  lactated ringers.  acetaminophen   Tablet ..  aluminum hydroxide/magnesium hydroxide/simethicone Suspension  ondansetron Injectable  polyethylene glycol 3350  bisacodyl Suppository  senna  HYDROmorphone  Injectable  aspirin  chewable  vancomycin  IVPB  digoxin     Tablet  prasugrel  metoprolol succinate ER  atorvastatin  spironolactone  cyclobenzaprine  gabapentin  rosuvastatin  tamsulosin  lidocaine 2% Injectable  lidocaine 2% Injectable  dextrose 5%.  dextrose 40% Gel  dextrose 50% Injectable  glucagon  Injectable  rifAMPin  HYDROmorphone  Injectable  insulin lispro (HumaLOG) corrective regimen sliding scale  insulin glargine Injectable (LANTUS)  insulin lispro Injectable (HumaLOG)  DAPTOmycin IVPB  cefepime   IVPB  cefepime   IVPB  cefepime   IVPB  diazepam    Tablet  insulin glargine Injectable (LANTUS)  diphenhydrAMINE  insulin lispro Injectable (HumaLOG)  HYDROmorphone   Tablet  HYDROmorphone   Tablet  morphine  - Injectable  morphine  IR  morphine  IR  insulin glargine Injectable (LANTUS)  sertraline  gabapentin  gabapentin  gabapentin  sodium chloride 0.9% Bolus      ROS: Const:  -___febrile   Eyes:___ENT:___CV: __-_chest pain  Resp: __-__sob  GI:_-__nausea __-_vomiting _x_ abd pain ___npo ___clears _x_full diet __bm  :___ Musk: _x__pain _-__spasm  Skin:___ Neuro:  _-__yzoqfqtr_-__aoncnvhng_-__ numbness _-__weakness __x_paresth  Psych:_-_anxiety  Endo:___ Heme:___Allergy:_________, _x__all others reviewed and negative      PAST MEDICAL & SURGICAL HISTORY:  Diabetic neuropathy  STEMI (ST elevation myocardial infarction)  Diverticulitis  MRSA bacteremia  History of celiac disease  CHF (congestive heart failure): EF ~ 25%  HTN (hypertension)  Diabetes: on insulin pump  Blood clot due to device, implant, or graft: was on blood thinners  HLD (hyperlipidemia)  Osteoarthritis  Atherosclerosis of coronary artery: CAD (coronary artery disease)  Status post percutaneous transluminal coronary angioplasty: in 2012  History of open reduction and internal fixation (ORIF) procedure  Surgery, elective: Right shoulder  Surgery, elective: right knee wound debridement  S/P TKR (total knee replacement), right: with infection Mrsa   per pt he was cleared from MRSA infection  S/P CABG x 1: 2018  Stented coronary artery: 10/18 heart attack  INFERIOR WALL MI  Other postprocedural status: Fixation hardware in foot LEFT      09-09 @ 07:2093 mL/min/1.73M2      Hemoglobin: 7.8 g/dL (09-09 @ 07:20)  Hemoglobin: 8.4 g/dL (09-08 @ 06:37)        T(C): 36.9 (09-09-20 @ 12:39), Max: 36.9 (09-09-20 @ 12:39)  HR: 92 (09-09-20 @ 12:39) (73 - 92)  BP: 123/72 (09-09-20 @ 12:39) (92/49 - 123/72)  RR: 19 (09-09-20 @ 12:39) (17 - 19)  SpO2: 98% (09-09-20 @ 12:39) (98% - 100%)  Wt(kg): --          PHYSICAL EXAM:  Gen Appearance: x___no acute distress _x__appropriate        Neuro: _x__SILT feet____ EOM Intact Psych: AAOX__3, x___mood/affect appropriate        Eyes: __x_conjunctiva WNL  __x__ Pupils equal and round        ENT: x___ears and nose atraumatic_x__ Hearing grossly intact        Neck: _x__trachea midline, no visible masses ___thyroid without palpable mass    Resp: _x__Nml WOB____No tactile fremitus ___clear to auscultation    Cardio: ___extremities free from edema __x__pedal pulses palpable    GI/Abdomen: __x_soft ___x__ Nontender___x___Nondistended_____HSM    Lymphatic: ___no palpable nodes in neck  ___no palpable nodes calves and feet    Skin/Wound: ___Incision, _x__Dressing c/d/i,   ____surrounding tissues soft,  ___drain/chest tube present____    Muscular: EHL _4__/5  Gastrocnemius_4__/5    ___absent clubbing/cyanosis          ASSESSMENT: This is a 65y old Male with a history of knee pain, diabetic neuropathy, CHF, HTN, MRSA bacteremia, diverticulitis, COPD, hyperkalemia, chronic systolic CHF, osteoarthritis,  s/p R Revision TKA and antibiotic spacer by Dr. Castro on 7/22, now presenting with R knee pain and swelling x3 days, pain managed with current pain medication regimen.       Recommended Treatment PLAN:  1. Morphine IR 15-30mg Po Q4h prn moderate to severe pain  2. Flexeril 5mg PO Q8h prn muscle spasms  3. Gabapentin 100mg PO TID  4. Morphine 2mg IVP Q4h prn breakthrough pain  Plan discussed with Dr. Max

## 2020-09-09 NOTE — PROGRESS NOTE ADULT - ASSESSMENT
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  He has completed 6 weeks of vancomycin & rifampin.  He is s/p R hip exchange of hardware on 9/4 - OR cultures NGTD.  I&D of abd wall done 9/4 - cultures growing Pseudomonas and Enterococcus faecium.  Unclear if he has persistent hole in GB resulting in fluid in fossa, absence of appearance of GB on CT scan or if limited to abd wall.  If related to GB, will need to add anaerobic coverage.  He is not toxic appearing and has very benign abd exam - will hold off for now.  HIDA scan today unrevealing, therefore will get Gallium scan.      Suggest:  - continue to follow OR cultures from R hip  - Continue daptomycin 675 mg IV q24h.  Please check CPK qSaturday  - Continue cefepime 2 g IV q8h  - Continue rifampin 300 mg po q12h  - f/u gallium sacn    Dr Kay will assume care from tomorrow. 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  He has completed 6 weeks of vancomycin & rifampin.  He is s/p R hip exchange of hardware on 9/4 - OR cultures NGTD.  I&D of abd wall done 9/4 - cultures growing Pseudomonas and Enterococcus faecium.  Unclear if he has persistent hole in GB resulting in fluid in fossa, absence of appearance of GB on CT scan or if limited to abd wall.  If related to GB, will need to add anaerobic coverage.  He is not toxic appearing and has very benign abd exam - will hold off for now.  HIDA scan was unrevealing, he will have initial imaging for gallium scan today.      Suggest:  - continue to follow OR cultures from R hip  - Continue daptomycin 675 mg IV q24h.  Please check CPK qSaturday  - Continue cefepime 2 g IV q8h  - Continue rifampin 300 mg po q12h  - f/u gallium sacn    Dr Kay will assume care from tomorrow.

## 2020-09-09 NOTE — CHART NOTE - NSCHARTNOTEFT_GEN_A_CORE
Admitting Diagnosis:   Patient is a 65y old  Male who presents with a chief complaint of R Knee Swelling (09 Sep 2020 11:10)      PAST MEDICAL & SURGICAL HISTORY:  Diabetic neuropathy  STEMI (ST elevation myocardial infarction)  Diverticulitis  MRSA bacteremia  History of celiac disease  CHF (congestive heart failure): EF ~ 25%  HTN (hypertension)  Diabetes: on insulin pump  Blood clot due to device, implant, or graft: was on blood thinners  HLD (hyperlipidemia)  Osteoarthritis  Atherosclerosis of coronary artery: CAD (coronary artery disease)  Status post percutaneous transluminal coronary angioplasty: in 2012  History of open reduction and internal fixation (ORIF) procedure  Surgery, elective: Right shoulder  Surgery, elective: right knee wound debridement  S/P TKR (total knee replacement), right: with infection Mrsa   per pt he was cleared from MRSA infection  S/P CABG x 1: 2018  Stented coronary artery: 10/18 heart attack  INFERIOR WALL MI  Other postprocedural status: Fixation hardware in foot LEFT      Current Nutrition Order: C-CHO with snack and 3 Glucerna shakes (600kcal and 30gmprotein)       PO Intake: Good (%) [x   ]  Fair (50-75%) [   ] Poor (<25%) [   ]    GI Issues: none    Pain:yes in knee    Skin Integrity:RUQ drainage of wound of left foot and knee/surgical incision.    Labs:   09-09    136  |  105  |  23  ----------------------------<  101<H>  4.8   |  22  |  0.84    Ca    8.8      09 Sep 2020 07:20      CAPILLARY BLOOD GLUCOSE      POCT Blood Glucose.: 132 mg/dL (09 Sep 2020 12:14)  POCT Blood Glucose.: 123 mg/dL (09 Sep 2020 07:49)  POCT Blood Glucose.: 173 mg/dL (08 Sep 2020 21:51)  POCT Blood Glucose.: 222 mg/dL (08 Sep 2020 18:09)      Medications:  MEDICATIONS  (STANDING):  acetaminophen   Tablet .. 650 milliGRAM(s) Oral every 6 hours  aspirin  chewable 81 milliGRAM(s) Oral two times a day  cefepime   IVPB      cefepime   IVPB 2000 milliGRAM(s) IV Intermittent every 8 hours  DAPTOmycin IVPB 675 milliGRAM(s) IV Intermittent every 24 hours  dextrose 5%. 1000 milliLiter(s) (50 mL/Hr) IV Continuous <Continuous>  dextrose 50% Injectable 12.5 Gram(s) IV Push once  dextrose 50% Injectable 25 Gram(s) IV Push once  dextrose 50% Injectable 25 Gram(s) IV Push once  digoxin     Tablet 0.125 milliGRAM(s) Oral daily  gabapentin 100 milliGRAM(s) Oral <User Schedule>  gabapentin 200 milliGRAM(s) Oral at bedtime  insulin glargine Injectable (LANTUS) 20 Unit(s) SubCutaneous at bedtime  insulin lispro (HumaLOG) corrective regimen sliding scale   SubCutaneous Before meals and at bedtime  insulin lispro Injectable (HumaLOG) 8 Unit(s) SubCutaneous three times a day before meals  lactated ringers. 1000 milliLiter(s) (50 mL/Hr) IV Continuous <Continuous>  lidocaine 2% Injectable 10 milliLiter(s) Local Injection once  metoprolol succinate ER 25 milliGRAM(s) Oral daily  polyethylene glycol 3350 17 Gram(s) Oral daily  prasugrel 10 milliGRAM(s) Oral daily  rifAMPin 300 milliGRAM(s) Oral every 12 hours  rosuvastatin 10 milliGRAM(s) Oral at bedtime  sertraline 25 milliGRAM(s) Oral daily  spironolactone 25 milliGRAM(s) Oral daily  tamsulosin 0.4 milliGRAM(s) Oral at bedtime    MEDICATIONS  (PRN):  aluminum hydroxide/magnesium hydroxide/simethicone Suspension 30 milliLiter(s) Oral four times a day PRN Indigestion  bisacodyl Suppository 10 milliGRAM(s) Rectal daily PRN If no bowel movement by POD#2  cyclobenzaprine 5 milliGRAM(s) Oral three times a day PRN Muscle Spasm  dextrose 40% Gel 15 Gram(s) Oral once PRN Blood Glucose LESS THAN 70 milliGRAM(s)/deciliter  diazepam    Tablet 5 milliGRAM(s) Oral every 8 hours PRN anxiety/agitaiton  diphenhydrAMINE 50 milliGRAM(s) Oral every 4 hours PRN Rash and/or Itching  glucagon  Injectable 1 milliGRAM(s) IntraMuscular once PRN Glucose LESS THAN 70 milligrams/deciliter  morphine  - Injectable 2 milliGRAM(s) IV Push every 4 hours PRN breakthrough pain  morphine  IR 15 milliGRAM(s) Oral every 4 hours PRN Moderate Pain (4 - 6)  morphine  IR 30 milliGRAM(s) Oral every 4 hours PRN Severe Pain (7 - 10)  ondansetron Injectable 4 milliGRAM(s) IV Push every 6 hours PRN Nausea and/or Vomiting  senna 2 Tablet(s) Oral at bedtime PRN Constipation      Weight:82.1kg  Daily     Daily no updated weights    Weight Change: stable    Estimated energy needs: Based on ABW due to between % of IBW.ABW:82.1kg y50-77yaml:2053-2463kcal and 1.2-1.4gmprotein:98.5-115gmprotein and fluids per team due to CHF     Subjective: 64y/o male with extensive history of 2TDM,HTN,CHF,Pulmonary HTN, CAD, Peripheral neuropathy, Chronic Cholecystitis admitted with right knee swelling /MRSA. Noted issue of depression. Eating 80% of trays.    Previous Nutrition Diagnosis:Increased protein needs r/t increased needs 2/2 wound healing AEB: wounds    Active [  x ]  Resolved [   ]    If resolved, new PES:     Goal:Meet 80% of needs consistently    Recommendations1.:Change Glucerna shake to 1x/per day (220kcal and 10gmprotein 2.Updated weights  Education: initiated. Needs follow up upon D/C with RD    Risk Level: High [   ] Moderate [x   ] Low [   ]

## 2020-09-09 NOTE — PROGRESS NOTE ADULT - SUBJECTIVE AND OBJECTIVE BOX
No acute changes overnight.    According to the night nurse, the outer dressing was soaked with purulent material, the patient remains AVSS.    Going for Gallium study today, HIDA scan was done 9/6/2020 and did not visualize the gall bladder    MEDICATIONS  (STANDING):  acetaminophen   Tablet .. 650 milliGRAM(s) Oral every 6 hours  aspirin  chewable 81 milliGRAM(s) Oral two times a day  cefepime   IVPB      cefepime   IVPB 2000 milliGRAM(s) IV Intermittent every 8 hours  DAPTOmycin IVPB 675 milliGRAM(s) IV Intermittent every 24 hours  dextrose 5%. 1000 milliLiter(s) (50 mL/Hr) IV Continuous <Continuous>  dextrose 50% Injectable 12.5 Gram(s) IV Push once  dextrose 50% Injectable 25 Gram(s) IV Push once  dextrose 50% Injectable 25 Gram(s) IV Push once  digoxin     Tablet 0.125 milliGRAM(s) Oral daily  gabapentin 100 milliGRAM(s) Oral <User Schedule>  gabapentin 200 milliGRAM(s) Oral at bedtime  insulin glargine Injectable (LANTUS) 20 Unit(s) SubCutaneous at bedtime  insulin lispro (HumaLOG) corrective regimen sliding scale   SubCutaneous Before meals and at bedtime  insulin lispro Injectable (HumaLOG) 8 Unit(s) SubCutaneous three times a day before meals  lactated ringers. 1000 milliLiter(s) (50 mL/Hr) IV Continuous <Continuous>  lidocaine 2% Injectable 10 milliLiter(s) Local Injection once  metoprolol succinate ER 25 milliGRAM(s) Oral daily  polyethylene glycol 3350 17 Gram(s) Oral daily  prasugrel 10 milliGRAM(s) Oral daily  rifAMPin 300 milliGRAM(s) Oral every 12 hours  rosuvastatin 10 milliGRAM(s) Oral at bedtime  sertraline 25 milliGRAM(s) Oral daily  spironolactone 25 milliGRAM(s) Oral daily  tamsulosin 0.4 milliGRAM(s) Oral at bedtime    MEDICATIONS  (PRN):  aluminum hydroxide/magnesium hydroxide/simethicone Suspension 30 milliLiter(s) Oral four times a day PRN Indigestion  bisacodyl Suppository 10 milliGRAM(s) Rectal daily PRN If no bowel movement by POD#2  cyclobenzaprine 5 milliGRAM(s) Oral three times a day PRN Muscle Spasm  dextrose 40% Gel 15 Gram(s) Oral once PRN Blood Glucose LESS THAN 70 milliGRAM(s)/deciliter  diazepam    Tablet 5 milliGRAM(s) Oral every 8 hours PRN anxiety/agitaiton  diphenhydrAMINE 50 milliGRAM(s) Oral every 4 hours PRN Rash and/or Itching  glucagon  Injectable 1 milliGRAM(s) IntraMuscular once PRN Glucose LESS THAN 70 milligrams/deciliter  morphine  - Injectable 2 milliGRAM(s) IV Push every 4 hours PRN breakthrough pain  morphine  IR 15 milliGRAM(s) Oral every 4 hours PRN Moderate Pain (4 - 6)  morphine  IR 30 milliGRAM(s) Oral every 4 hours PRN Severe Pain (7 - 10)  ondansetron Injectable 4 milliGRAM(s) IV Push every 6 hours PRN Nausea and/or Vomiting  senna 2 Tablet(s) Oral at bedtime PRN Constipation      I&O's Detail    08 Sep 2020 07:01  -  09 Sep 2020 07:00  --------------------------------------------------------  IN:    Solution: 50 mL    Solution: 100 mL  Total IN: 150 mL    OUT:    Voided: 400 mL  Total OUT: 400 mL    Total NET: -250 mL      09 Sep 2020 07:01  -  09 Sep 2020 14:09  --------------------------------------------------------  IN:    Sodium Chloride 0.9% IV Bolus: 500 mL  Total IN: 500 mL    OUT:  Total OUT: 0 mL    Total NET: 500 mL      Vital Signs Last 24 Hrs  T(C): 36.9 (09 Sep 2020 12:39), Max: 36.9 (09 Sep 2020 12:39)  T(F): 98.4 (09 Sep 2020 12:39), Max: 98.4 (09 Sep 2020 12:39)  HR: 92 (09 Sep 2020 12:39) (73 - 92)  BP: 123/72 (09 Sep 2020 12:39) (92/49 - 123/72)  BP(mean): --  RR: 19 (09 Sep 2020 12:39) (17 - 19)  SpO2: 98% (09 Sep 2020 12:39) (98% - 100%)    General: NAD, resting comfortably in bed, chatty and working on his computer   C/V: pulses present in b/l upper and lower extremities   Pulm: Nonlabored breathing, no respiratory distress  Abd: soft, ND, NT, no rebound or guarding, incision site with minimal erythema, minimal induration, minimal fluid/purulent material, dressings mostly clean and dry with some minimal yellow purulent fluid and residual packing  Extrem: WWP, no edema, no SCDs, b/l lower extremity bandages                             7.8    7.97  )-----------( 381      ( 09 Sep 2020 07:20 )             28.0   09-09    136  |  105  |  23  ----------------------------<  101<H>  4.8   |  22  |  0.84    Ca    8.8      09 Sep 2020 07:20    < from: [facility] Hepatobiliary Imaging (09.06.20 @ 15:34) >      < end of copied text >  < from: [facility] Hepatobiliary Imaging (09.06.20 @ 15:34) >    Findings: There is prompt uptake of radiopharmaceutical by the liver with prompt excretion into the biliary tree and bowel. The gallbladder is not definitively visualized.    There is persistent cardiac activity suggestive of hepatic dysfunction.      Impression:  Nonvisualization of the gallbladder within 2 hours. Evaluation is limited as patient was unable to tolerate further delayed imaging. Additionally, persistent cardiac indicating hepatic dysfunction as well as previous reports demonstrating severely contracted gallbladder.The combination of which makes false positive on HIDA scan more likely.          < end of copied text >

## 2020-09-09 NOTE — PROGRESS NOTE ADULT - SUBJECTIVE AND OBJECTIVE BOX
Orthopaedic Surgery Progress Note    S/p R knee antibiotic spacer 07/22 who presented with R knee swelling and RUQ wound drainage on 09/01/2020    Subjective:     Patient seen and examined. Notes some discomfort and swelling in L leg this AM. States the drainage in his abdomen has improved. States his R knee pain is the same. He is concerned about the L leg wound today and would like for vascular/plastics to see him today. Pt became tearful and worried this AM during interview, afraid of dying. Saw psychiatrist yesterday, states session was helpful.  Denies chest pain, shortness of breath, nausea/vomiting, numbness/tingling.    Objective:    Vital Signs Last 24 Hrs  T(C): 36.6 (09 Sep 2020 00:51), Max: 37 (08 Sep 2020 08:47)  T(F): 97.9 (09 Sep 2020 00:51), Max: 98.6 (08 Sep 2020 08:47)  HR: 78 (09 Sep 2020 00:51) (73 - 93)  BP: 107/66 (09 Sep 2020 00:51) (104/53 - 120/70)  BP(mean): --  RR: 17 (09 Sep 2020 00:51) (17 - 18)  SpO2: 100% (09 Sep 2020 00:51) (99% - 100%)    PE:  General: Patient alert and oriented, NAD  RLE: R hip dressing c/d/i. R knee with 2cm area of granulation tissue and fibrinous tissue w/o tenderness or erythema, proximal and distal wound edges are healed well,   LLE: Anterior tibia with skin graft s/p debridement by plastic surgery, no erythema  Pulses: 2+ DP BLE  Sensation: SILT BLE   Motor: EHL/FHL/TA/GS 5/5 BLE                          8.4    9.76  )-----------( 378      ( 08 Sep 2020 06:37 )             30.0   08 Sep 2020 06:37    134    |  104    |  27     ----------------------------<  242    4.9     |  22     |  0.85       TPro  6.5    /  Alb  2.9    /  TBili  0.5    /  DBili  x      /  AST  19     /  ALT  18     /  AlkPhos  137    07 Sep 2020 07:42      A/P: 65yMale s/p R knee antibiotic spacer 07/22 who presented with R knee swelling and RUQ wound drainage on 09/01/2020  1. Pain control as needed. Appreciate pain management recs.   2. DVT prophylaxis: ASA, Effient   3. PT, weight-bearing status: WBAT BLE   4. Appreciate Gen Surg recs for RUQ wound. Pt feels that wound is improving. HIDA scan inconclusive. Gallium scan ordered.  5. Continue antibiotics. Appreciate ID recs.   6. Left leg wound managed by plastics and vascular. F/u Plastics and vascular recs  7. Psych consult yesterday for depression, started on sertraline 25 QD and gabapentin bedtime dose increased to 200 as per recs  8. Dispo: pending improvement     Ortho Pager [phone]

## 2020-09-09 NOTE — CONSULT NOTE ADULT - SUBJECTIVE AND OBJECTIVE BOX
SURGERY CONSULT    Juanito Blas is a 64y/o M with PMHx of CAD (s/p CABG 2018), CHF (EF 25%) Diabetes, HLD, HTN, R Revision Total Knee Arthroplasty with Antibiotic Spacer on 7/22 by Dr. Castro for Prosthetic Joint Infection, presents with 2-3 days of worsening R knee pain and swelling. Pt denies any trauma to R knee. Pt denies any numbness/tingling. Notes minimal drainage from incision, no purulence. Pt denies any recent illness. Pt was discharged last hospital visit 3 weeks ago after multiple revision knee surgeries complicated by prosthetic joint infection and bacteremia. Sent home with PICC line, getting vancomycin 1000 Q12 and Rifampin 300 Q12 daily. Reports compliance with medication. Denies any chest pain/SOB/fevers/chills.     Pt also has chronic large skin defect on L anterior tibia, which was managed by vascular and plastic surgery last visit. Only reports mild pain over this wound, denies any drainage, trauma. (01 Sep 2020 23:12). Vascular surgery consulted for the assessment of this skin defect.      PAST MEDICAL & SURGICAL HISTORY:  Diabetic neuropathy  STEMI (ST elevation myocardial infarction)  Diverticulitis  MRSA bacteremia  History of celiac disease  CHF (congestive heart failure): EF ~ 25%  HTN (hypertension)  Diabetes: on insulin pump  Blood clot due to device, implant, or graft: was on blood thinners  HLD (hyperlipidemia)  Osteoarthritis  Atherosclerosis of coronary artery: CAD (coronary artery disease)  Status post percutaneous transluminal coronary angioplasty: in 2012  History of open reduction and internal fixation (ORIF) procedure  Surgery, elective: Right shoulder  Surgery, elective: right knee wound debridement  S/P TKR (total knee replacement), right: with infection Mrsa   per pt he was cleared from MRSA infection  S/P CABG x 1: 2018  Stented coronary artery: 10/18 heart attack  INFERIOR WALL MI  Other postprocedural status: Fixation hardware in foot LEFT    Home Meds: Home Medications:  atorvastatin 40 mg oral tablet: 1 tab(s) orally once a day (at bedtime) (19 Jul 2020 12:06)  digoxin 125 mcg (0.125 mg) oral tablet: 1 tab(s) orally once a day (19 Jul 2020 12:06)  DULoxetine 30 mg oral delayed release capsule: 3 cap(s) orally once a day (19 Jul 2020 12:06)  pantoprazole 40 mg oral delayed release tablet: 1 tab(s) orally once a day, take 30 minutes before breakfast (19 Jul 2020 12:06)  prasugrel 10 mg oral tablet: 1 tab(s) orally once a day (19 Jul 2020 12:06)  spironolactone 25 mg oral tablet: 1 tab(s) orally once a day (23 Jul 2020 14:03)    Allergies: Allergies    ACE inhibitors (Hives)  carvedilol (Other)  enalapril (Hives)  Entresto (Other)    Intolerances      Soc:   Advanced Directives: Presumed Full Code     CURRENT MEDICATIONS:   --------------------------------------------------------------------------------------  Neurologic Medications  acetaminophen   Tablet .. 650 milliGRAM(s) Oral every 6 hours  cyclobenzaprine 5 milliGRAM(s) Oral three times a day PRN Muscle Spasm  diazepam    Tablet 5 milliGRAM(s) Oral every 8 hours PRN anxiety/agitaiton  diphenhydrAMINE 50 milliGRAM(s) Oral every 4 hours PRN Rash and/or Itching  gabapentin 100 milliGRAM(s) Oral <User Schedule>  gabapentin 200 milliGRAM(s) Oral at bedtime  morphine  - Injectable 2 milliGRAM(s) IV Push every 4 hours PRN breakthrough pain  morphine  IR 15 milliGRAM(s) Oral every 4 hours PRN Moderate Pain (4 - 6)  morphine  IR 30 milliGRAM(s) Oral every 4 hours PRN Severe Pain (7 - 10)  ondansetron Injectable 4 milliGRAM(s) IV Push every 6 hours PRN Nausea and/or Vomiting  sertraline 25 milliGRAM(s) Oral daily    Respiratory Medications    Cardiovascular Medications  digoxin     Tablet 0.125 milliGRAM(s) Oral daily  metoprolol succinate ER 25 milliGRAM(s) Oral daily  spironolactone 25 milliGRAM(s) Oral daily  tamsulosin 0.4 milliGRAM(s) Oral at bedtime    Gastrointestinal Medications  aluminum hydroxide/magnesium hydroxide/simethicone Suspension 30 milliLiter(s) Oral four times a day PRN Indigestion  bisacodyl Suppository 10 milliGRAM(s) Rectal daily PRN If no bowel movement by POD#2  dextrose 5%. 1000 milliLiter(s) IV Continuous <Continuous>  lactated ringers. 1000 milliLiter(s) IV Continuous <Continuous>  polyethylene glycol 3350 17 Gram(s) Oral daily  senna 2 Tablet(s) Oral at bedtime PRN Constipation    Genitourinary Medications    Hematologic/Oncologic Medications  aspirin  chewable 81 milliGRAM(s) Oral two times a day  prasugrel 10 milliGRAM(s) Oral daily    Antimicrobial/Immunologic Medications  cefepime   IVPB      cefepime   IVPB 2000 milliGRAM(s) IV Intermittent every 8 hours  DAPTOmycin IVPB 675 milliGRAM(s) IV Intermittent every 24 hours  rifAMPin 300 milliGRAM(s) Oral every 12 hours    Endocrine/Metabolic Medications  dextrose 40% Gel 15 Gram(s) Oral once PRN Blood Glucose LESS THAN 70 milliGRAM(s)/deciliter  dextrose 50% Injectable 12.5 Gram(s) IV Push once  dextrose 50% Injectable 25 Gram(s) IV Push once  dextrose 50% Injectable 25 Gram(s) IV Push once  glucagon  Injectable 1 milliGRAM(s) IntraMuscular once PRN Glucose LESS THAN 70 milligrams/deciliter  insulin glargine Injectable (LANTUS) 20 Unit(s) SubCutaneous at bedtime  insulin lispro (HumaLOG) corrective regimen sliding scale   SubCutaneous Before meals and at bedtime  insulin lispro Injectable (HumaLOG) 8 Unit(s) SubCutaneous three times a day before meals  rosuvastatin 10 milliGRAM(s) Oral at bedtime    Topical/Other Medications  lidocaine 2% Injectable 10 milliLiter(s) Local Injection once      Physical Exam:  General: NAD  Pulmonary: Nonlabored breathing, no respiratory distress  Abdominal: soft, NT/ND, no organomegaly  Extremities:  LLE - 5x3 inch open skin defect on anterior tibia that has small amount of pus. mild erythema around wound edges.  RLE - WNL.  Pulses- B/L LE pulses palpable +2    --------------------------------------------------------------------------------------    VITAL SIGNS, INS/OUTS (last 24 hours):  --------------------------------------------------------------------------------------      ICU Vital Signs Last 24 Hrs  T(C): 36.3 (09 Sep 2020 16:43), Max: 36.9 (09 Sep 2020 12:39)  T(F): 97.3 (09 Sep 2020 16:43), Max: 98.4 (09 Sep 2020 12:39)  HR: 80 (09 Sep 2020 16:50) (75 - 100)  BP: 117/69 (09 Sep 2020 16:50) (92/49 - 123/72)  BP(mean): --  ABP: --  ABP(mean): --  RR: 19 (09 Sep 2020 16:43) (17 - 19)  SpO2: 99% (09 Sep 2020 16:50) (98% - 100%)    I&O's Summary    08 Sep 2020 07:01  -  09 Sep 2020 07:00  --------------------------------------------------------  IN: 150 mL / OUT: 400 mL / NET: -250 mL    09 Sep 2020 07:01  -  09 Sep 2020 21:10  --------------------------------------------------------  IN: 500 mL / OUT: 800 mL / NET: -300 mL      --------------------------------------------------------------------------------------        LABS  --------------------------------------------------------------------------------------  Labs:  CAPILLARY BLOOD GLUCOSE      POCT Blood Glucose.: 143 mg/dL (09 Sep 2020 17:13)  POCT Blood Glucose.: 132 mg/dL (09 Sep 2020 12:14)  POCT Blood Glucose.: 123 mg/dL (09 Sep 2020 07:49)  POCT Blood Glucose.: 173 mg/dL (08 Sep 2020 21:51)                          7.8    7.97  )-----------( 381      ( 09 Sep 2020 07:20 )             28.0         09-09    136  |  105  |  23  ----------------------------<  101<H>  4.8   |  22  |  0.84      Calcium, Total Serum: 8.8 mg/dL (09-09-20 @ 07:20)      LFTs:         Coags:

## 2020-09-09 NOTE — PROGRESS NOTE ADULT - SUBJECTIVE AND OBJECTIVE BOX
INTERVAL HPI/OVERNIGHT EVENTS:  Seen at bedside. Still complaining of intermittent R knee pain.   Endorses decrease in discharge from RUQ       CONSTITUTIONAL:  No fever, chills, night sweats  EYES:  No photophobia or visual changes  CARDIOVASCULAR:  No chest pain  RESPIRATORY:  No cough, wheezing, or SOB   GASTROINTESTINAL:  No nausea, vomiting, diarrhea, constipation, or abdominal pain  GENITOURINARY:  No frequency, urgency, dysuria or hematuria  NEUROLOGIC:  No headache, lightheadedness      ANTIBIOTICS/RELEVANT:    Daptomycin 675 mg IV q24h (9/6-present)  Cefepime 2 g IV q8h (9/6-present)  Rifampin 300 mg po q12h        Vital Signs Last 24 Hrs  T(C): 36.3 (09 Sep 2020 16:43), Max: 36.9 (09 Sep 2020 12:39)  T(F): 97.3 (09 Sep 2020 16:43), Max: 98.4 (09 Sep 2020 12:39)  HR: 80 (09 Sep 2020 16:50) (73 - 100)  BP: 117/69 (09 Sep 2020 16:50) (92/49 - 123/72)  BP(mean): --  RR: 19 (09 Sep 2020 16:43) (17 - 19)  SpO2: 99% (09 Sep 2020 16:50) (98% - 100%)    PHYSICAL EXAM:    Constitutional:  Well-developed, well nourished, non-toxic  Eyes:  Sclerae anicteric, conjunctivae clear, PERRL  Ear/Nose/Throat:  No nasal exudate or sinus tenderness;  No buccal mucosal lesions, no pharyngeal erythema or exudate	  Neck:  Supple, no adenopathy  Respiratory:  Clear bilaterally  Cardiovascular:  AICD pocket without erythema, tenderness or warmth.  RRR, S1S2, no murmur appreciated  Gastrointestinal:  Symmetric, normoactive BS, soft, NT, no masses, guarding or rebound.  No HSM.  I&D site dressed by surgery  Extremities:  No edema;  R hip incision dressed, R knee incision with eschar          LABS:                        7.8    7.97  )-----------( 381      ( 09 Sep 2020 07:20 )             28.0     Auto Neutrophil %: 66.4 % (09-06-20 @ 07:36)  Auto Neutrophil %: 66.8 % (09-02-20 @ 05:51)  Auto Neutrophil %: 71.9 % (09-01-20 @ 17:27)  Band Neutrophils %: 1.8 % (09-01-20 @ 17:27)      09-09    136  |  105  |  23  ----------------------------<  101<H>  4.8   |  22  |  0.84    Ca    8.8      09 Sep 2020 07:20            MICROBIOLOGY:    Blood cultures:  9/1 X 2 - NG  Abd wall wound 9/3 - VRE. faecium and Pseudomonas aeruginosa    OR cultures R hip 9/4:  NGTD    Cultures from abd wall I&D 9/4 - VRE. faecium and Pseudomonas aeruginosa        RADIOLOGY & ADDITIONAL STUDIES:

## 2020-09-09 NOTE — PROGRESS NOTE ADULT - PROBLEM SELECTOR PLAN 1
on broad spectrum antibiotics,   f/u intraop c/s  f/u vanco level and vanco was adjusted  I will discuss with ortho regarding the recent cultures from the right hip and is negative but the abdominal wound cultures are positive fro VREF and Pseudomonas.  He is on vanco and Rifampin  culture positive for MRSA with negative blood culture repeat but 4 bottles were positive on admission  JOHN PAUL unlikely endocarditis but might need to be treated as such.  The AICD bed is stable with no signs of infection  On Vanco and Rifampin, PICC line Monday.   hardware removed and follow on cultures which is negative to date  he is stable and he has area of necrosis over the wound and is followed by plastics  sepsis resolved and he is on antibiotic  I discussed with surgery and ID.  he is daptomycin and will follow with gallium scan  results of the hida reviewed and discussed with ID  await gallium

## 2020-09-09 NOTE — PROGRESS NOTE ADULT - ASSESSMENT
65M PMHx CAD s/p CABG, CHF, DM, HLD, HTN, acute cholecystitis s/p perc sudhir 2/28, recent R TKA 7/22 presents with R knee infection. General surgery consulted for purulent drainage near prior perc sudhir tube site.     POD4 s/p bedside I&D at perc sudhir site (9/4) with improvement in erythema, induration and purulent expression. WBCs wnl, afebrile, CRP downtrending. Last LFTs were down trending    Cx from RUQ drainage showed Pseudomonas     HIDA scan was not able to visualize the gall bladder, patient was not able to tolerate delayed imaging, possible false positive finding    Plan discussed with Dr. Latif and Chief resident     - change guaze and tape daily, monitor for increased induration or purulent expression   - monitor WBCs and fevers   - FU on Gallium study to properly visualize the gall bladder  - Team 4c will continue to follow

## 2020-09-09 NOTE — PROGRESS NOTE ADULT - ATTENDING COMMENTS
I have reviewed the medical record, including laboratory and radiographic studies, interviewed and examined the patient and discussed the plan with Dr. Noel, the ID Resident.  Agree with above. Will continue to follow with you – ID Team 1.  Dr. Kay will assume care tomorrow.

## 2020-09-09 NOTE — PROGRESS NOTE ADULT - ASSESSMENT
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  He is now s/p arthroscopic I&D in view of prior difficulties with his incision/wound and other co-morbidities.  Unfortunately, he was on cipro while this occurred - no organisms seen on initial gram stain.  Although infection at present may represent persistence from November (MRSA), cannot presume, particularly in view of ST compromise.  He has had extensive antibiotic exposure in the interim and has spent a long time on inpatient services.  He had transaminitis with rifampin in past - would not attempt to add unless Staph grows.   9 day s/p right knee arthroscopic I and D, 7/17/2020  4 days s/p right knee explant, 7/22/2020

## 2020-09-10 LAB
ANION GAP SERPL CALC-SCNC: 8 MMOL/L — SIGNIFICANT CHANGE UP (ref 5–17)
BUN SERPL-MCNC: 24 MG/DL — HIGH (ref 7–23)
CALCIUM SERPL-MCNC: 8.4 MG/DL — SIGNIFICANT CHANGE UP (ref 8.4–10.5)
CHLORIDE SERPL-SCNC: 105 MMOL/L — SIGNIFICANT CHANGE UP (ref 96–108)
CO2 SERPL-SCNC: 20 MMOL/L — LOW (ref 22–31)
CREAT SERPL-MCNC: 0.82 MG/DL — SIGNIFICANT CHANGE UP (ref 0.5–1.3)
CRP SERPL-MCNC: 2.32 MG/DL — HIGH (ref 0–0.4)
ERYTHROCYTE [SEDIMENTATION RATE] IN BLOOD: 25 MM/HR — HIGH
FOLATE SERPL-MCNC: 9.5 NG/ML — SIGNIFICANT CHANGE UP
GLUCOSE BLDC GLUCOMTR-MCNC: 104 MG/DL — HIGH (ref 70–99)
GLUCOSE BLDC GLUCOMTR-MCNC: 129 MG/DL — HIGH (ref 70–99)
GLUCOSE BLDC GLUCOMTR-MCNC: 164 MG/DL — HIGH (ref 70–99)
GLUCOSE BLDC GLUCOMTR-MCNC: 207 MG/DL — HIGH (ref 70–99)
GLUCOSE SERPL-MCNC: 130 MG/DL — HIGH (ref 70–99)
HCT VFR BLD CALC: 26.9 % — LOW (ref 39–50)
HGB BLD-MCNC: 7.4 G/DL — LOW (ref 13–17)
MCHC RBC-ENTMCNC: 19.3 PG — LOW (ref 27–34)
MCHC RBC-ENTMCNC: 27.5 GM/DL — LOW (ref 32–36)
MCV RBC AUTO: 70.1 FL — LOW (ref 80–100)
NRBC # BLD: 0 /100 WBCS — SIGNIFICANT CHANGE UP (ref 0–0)
PLATELET # BLD AUTO: 367 K/UL — SIGNIFICANT CHANGE UP (ref 150–400)
POTASSIUM SERPL-MCNC: 4.5 MMOL/L — SIGNIFICANT CHANGE UP (ref 3.5–5.3)
POTASSIUM SERPL-SCNC: 4.5 MMOL/L — SIGNIFICANT CHANGE UP (ref 3.5–5.3)
RBC # BLD: 3.84 M/UL — LOW (ref 4.2–5.8)
RBC # FLD: 19.6 % — HIGH (ref 10.3–14.5)
SODIUM SERPL-SCNC: 133 MMOL/L — LOW (ref 135–145)
SURGICAL PATHOLOGY STUDY: SIGNIFICANT CHANGE UP
T PALLIDUM AB TITR SER: NEGATIVE — SIGNIFICANT CHANGE UP
TSH SERPL-MCNC: 7.03 UIU/ML — HIGH (ref 0.35–4.94)
VIT B12 SERPL-MCNC: 499 PG/ML — SIGNIFICANT CHANGE UP (ref 232–1245)
WBC # BLD: 7.75 K/UL — SIGNIFICANT CHANGE UP (ref 3.8–10.5)
WBC # FLD AUTO: 7.75 K/UL — SIGNIFICANT CHANGE UP (ref 3.8–10.5)

## 2020-09-10 PROCEDURE — 99233 SBSQ HOSP IP/OBS HIGH 50: CPT | Mod: GC

## 2020-09-10 PROCEDURE — 78830 RP LOCLZJ TUM SPECT W/CT 1: CPT | Mod: 26

## 2020-09-10 PROCEDURE — 99232 SBSQ HOSP IP/OBS MODERATE 35: CPT | Mod: GC

## 2020-09-10 RX ORDER — INSULIN GLARGINE 100 [IU]/ML
10 INJECTION, SOLUTION SUBCUTANEOUS AT BEDTIME
Refills: 0 | Status: DISCONTINUED | OUTPATIENT
Start: 2020-09-10 | End: 2020-09-11

## 2020-09-10 RX ORDER — DIPHENHYDRAMINE HCL 50 MG
25 CAPSULE ORAL EVERY 6 HOURS
Refills: 0 | Status: DISCONTINUED | OUTPATIENT
Start: 2020-09-10 | End: 2020-09-18

## 2020-09-10 RX ORDER — METOCLOPRAMIDE HCL 10 MG
10 TABLET ORAL EVERY 6 HOURS
Refills: 0 | Status: DISCONTINUED | OUTPATIENT
Start: 2020-09-10 | End: 2020-09-18

## 2020-09-10 RX ORDER — PANTOPRAZOLE SODIUM 20 MG/1
40 TABLET, DELAYED RELEASE ORAL DAILY
Refills: 0 | Status: DISCONTINUED | OUTPATIENT
Start: 2020-09-10 | End: 2020-09-18

## 2020-09-10 RX ORDER — SODIUM CHLORIDE 9 MG/ML
1000 INJECTION, SOLUTION INTRAVENOUS
Refills: 0 | Status: DISCONTINUED | OUTPATIENT
Start: 2020-09-10 | End: 2020-09-17

## 2020-09-10 RX ORDER — ACETAMINOPHEN 500 MG
650 TABLET ORAL EVERY 6 HOURS
Refills: 0 | Status: DISCONTINUED | OUTPATIENT
Start: 2020-09-10 | End: 2020-09-18

## 2020-09-10 RX ORDER — LEVOTHYROXINE SODIUM 125 MCG
50 TABLET ORAL DAILY
Refills: 0 | Status: DISCONTINUED | OUTPATIENT
Start: 2020-09-10 | End: 2020-09-18

## 2020-09-10 RX ADMIN — Medication 0.12 MILLIGRAM(S): at 07:10

## 2020-09-10 RX ADMIN — Medication 50 MICROGRAM(S): at 12:23

## 2020-09-10 RX ADMIN — Medication 4: at 20:55

## 2020-09-10 RX ADMIN — MORPHINE SULFATE 30 MILLIGRAM(S): 50 CAPSULE, EXTENDED RELEASE ORAL at 23:39

## 2020-09-10 RX ADMIN — Medication 81 MILLIGRAM(S): at 07:10

## 2020-09-10 RX ADMIN — DAPTOMYCIN 127 MILLIGRAM(S): 500 INJECTION, POWDER, LYOPHILIZED, FOR SOLUTION INTRAVENOUS at 01:13

## 2020-09-10 RX ADMIN — ROSUVASTATIN CALCIUM 10 MILLIGRAM(S): 5 TABLET ORAL at 23:10

## 2020-09-10 RX ADMIN — Medication 8 UNIT(S): at 17:31

## 2020-09-10 RX ADMIN — SPIRONOLACTONE 25 MILLIGRAM(S): 25 TABLET, FILM COATED ORAL at 07:10

## 2020-09-10 RX ADMIN — MORPHINE SULFATE 30 MILLIGRAM(S): 50 CAPSULE, EXTENDED RELEASE ORAL at 09:30

## 2020-09-10 RX ADMIN — Medication 25 MILLIGRAM(S): at 07:12

## 2020-09-10 RX ADMIN — Medication 8 UNIT(S): at 13:28

## 2020-09-10 RX ADMIN — Medication 8 UNIT(S): at 08:10

## 2020-09-10 RX ADMIN — GABAPENTIN 200 MILLIGRAM(S): 400 CAPSULE ORAL at 23:10

## 2020-09-10 RX ADMIN — CEFEPIME 100 MILLIGRAM(S): 1 INJECTION, POWDER, FOR SOLUTION INTRAMUSCULAR; INTRAVENOUS at 07:10

## 2020-09-10 RX ADMIN — Medication 2: at 17:30

## 2020-09-10 RX ADMIN — SODIUM CHLORIDE 50 MILLILITER(S): 9 INJECTION, SOLUTION INTRAVENOUS at 11:34

## 2020-09-10 RX ADMIN — POLYETHYLENE GLYCOL 3350 17 GRAM(S): 17 POWDER, FOR SOLUTION ORAL at 12:23

## 2020-09-10 RX ADMIN — CEFEPIME 100 MILLIGRAM(S): 1 INJECTION, POWDER, FOR SOLUTION INTRAMUSCULAR; INTRAVENOUS at 17:11

## 2020-09-10 RX ADMIN — GABAPENTIN 100 MILLIGRAM(S): 400 CAPSULE ORAL at 13:29

## 2020-09-10 RX ADMIN — MORPHINE SULFATE 30 MILLIGRAM(S): 50 CAPSULE, EXTENDED RELEASE ORAL at 02:15

## 2020-09-10 RX ADMIN — PRASUGREL 10 MILLIGRAM(S): 5 TABLET, FILM COATED ORAL at 12:23

## 2020-09-10 RX ADMIN — Medication 81 MILLIGRAM(S): at 17:30

## 2020-09-10 RX ADMIN — ONDANSETRON 4 MILLIGRAM(S): 8 TABLET, FILM COATED ORAL at 09:31

## 2020-09-10 RX ADMIN — Medication 650 MILLIGRAM(S): at 00:30

## 2020-09-10 RX ADMIN — SERTRALINE 25 MILLIGRAM(S): 25 TABLET, FILM COATED ORAL at 12:23

## 2020-09-10 RX ADMIN — MORPHINE SULFATE 30 MILLIGRAM(S): 50 CAPSULE, EXTENDED RELEASE ORAL at 08:11

## 2020-09-10 RX ADMIN — GABAPENTIN 100 MILLIGRAM(S): 400 CAPSULE ORAL at 07:10

## 2020-09-10 RX ADMIN — Medication 650 MILLIGRAM(S): at 08:34

## 2020-09-10 RX ADMIN — PANTOPRAZOLE SODIUM 40 MILLIGRAM(S): 20 TABLET, DELAYED RELEASE ORAL at 11:14

## 2020-09-10 RX ADMIN — Medication 650 MILLIGRAM(S): at 07:11

## 2020-09-10 RX ADMIN — MORPHINE SULFATE 30 MILLIGRAM(S): 50 CAPSULE, EXTENDED RELEASE ORAL at 01:16

## 2020-09-10 RX ADMIN — INSULIN GLARGINE 10 UNIT(S): 100 INJECTION, SOLUTION SUBCUTANEOUS at 23:40

## 2020-09-10 RX ADMIN — Medication 5 MILLIGRAM(S): at 23:39

## 2020-09-10 NOTE — PROGRESS NOTE ADULT - SUBJECTIVE AND OBJECTIVE BOX
Orthopaedic Surgery Progress Note    S/p R knee antibiotic spacer 07/22 who presented with R knee swelling and RUQ wound drainage on 09/01/2020    Subjective:     Patient seen and examined. Patient comfortable. Denies any complaints. Pt mood and affect much better this AM compared to yesterday. Would like to know results of gallium scan    Objective:    Vital Signs Last 24 Hrs  T(C): 36.6 (10 Sep 2020 01:13), Max: 36.9 (09 Sep 2020 12:39)  T(F): 97.9 (10 Sep 2020 01:13), Max: 98.4 (09 Sep 2020 12:39)  HR: 80 (10 Sep 2020 01:13) (75 - 100)  BP: 109/58 (10 Sep 2020 01:13) (92/49 - 123/72)  BP(mean): --  RR: 19 (10 Sep 2020 01:13) (18 - 19)  SpO2: 99% (10 Sep 2020 01:13) (98% - 100%)    PE:  General: Patient alert and oriented, NAD  RLE: R hip dressing c/d/i. R knee with 2cm area of granulation tissue and fibrinous tissue w/o tenderness or erythema, proximal and distal wound edges are healed well,   LLE: Anterior tibia with skin graft s/p debridement by plastic surgery, no erythema  Pulses: 2+ DP BLE  Sensation: SILT BLE   Motor: EHL/FHL/TA/GS 5/5 BLE                          7.8    7.97  )-----------( 381      ( 09 Sep 2020 07:20 )             28.0   09 Sep 2020 07:20    136    |  105    |  23     ----------------------------<  101    4.8     |  22     |  0.84     Ca    8.8        09 Sep 2020 07:20    A/P: 65yMale s/p R knee antibiotic spacer 07/22 who presented with R knee swelling and RUQ wound drainage on 09/01/2020  1. Pain control as needed. Appreciate pain management recs.   2. DVT prophylaxis: ASA, Effient   3. PT, weight-bearing status: WBAT BLE   4. Appreciate Gen Surg recs for RUQ wound. Pt feels that wound is improving. HIDA scan inconclusive. F/u results gallium scan  5. Continue antibiotics. Appreciate ID recs.   6. Left leg wound managed by vascular. Wound care consulted. Vascular recommended plastics follow-up for LLE wound  7. Continue sertraline and gabapentin per psych. Appreciate recs.  8. Dispo: pending improvement     Ortho Pager [phone]

## 2020-09-10 NOTE — PROGRESS NOTE ADULT - SUBJECTIVE AND OBJECTIVE BOX
No acute issues overnight.  AVSS, denies abdominal pain, nausea or vomiting/    MEDICATIONS  (STANDING):  acetaminophen   Tablet .. 650 milliGRAM(s) Oral every 6 hours  aspirin  chewable 81 milliGRAM(s) Oral two times a day  cefepime   IVPB      cefepime   IVPB 2000 milliGRAM(s) IV Intermittent every 8 hours  DAPTOmycin IVPB 675 milliGRAM(s) IV Intermittent every 24 hours  dextrose 5%. 1000 milliLiter(s) (50 mL/Hr) IV Continuous <Continuous>  dextrose 50% Injectable 12.5 Gram(s) IV Push once  dextrose 50% Injectable 25 Gram(s) IV Push once  dextrose 50% Injectable 25 Gram(s) IV Push once  digoxin     Tablet 0.125 milliGRAM(s) Oral daily  gabapentin 100 milliGRAM(s) Oral <User Schedule>  gabapentin 200 milliGRAM(s) Oral at bedtime  insulin glargine Injectable (LANTUS) 20 Unit(s) SubCutaneous at bedtime  insulin lispro (HumaLOG) corrective regimen sliding scale   SubCutaneous Before meals and at bedtime  insulin lispro Injectable (HumaLOG) 8 Unit(s) SubCutaneous three times a day before meals  lactated ringers. 1000 milliLiter(s) (50 mL/Hr) IV Continuous <Continuous>  lidocaine 2% Injectable 10 milliLiter(s) Local Injection once  metoprolol succinate ER 25 milliGRAM(s) Oral daily  polyethylene glycol 3350 17 Gram(s) Oral daily  prasugrel 10 milliGRAM(s) Oral daily  rifAMPin 300 milliGRAM(s) Oral every 12 hours  rosuvastatin 10 milliGRAM(s) Oral at bedtime  sertraline 25 milliGRAM(s) Oral daily  spironolactone 25 milliGRAM(s) Oral daily  tamsulosin 0.4 milliGRAM(s) Oral at bedtime    MEDICATIONS  (PRN):  aluminum hydroxide/magnesium hydroxide/simethicone Suspension 30 milliLiter(s) Oral four times a day PRN Indigestion  bisacodyl Suppository 10 milliGRAM(s) Rectal daily PRN If no bowel movement by POD#2  cyclobenzaprine 5 milliGRAM(s) Oral three times a day PRN Muscle Spasm  dextrose 40% Gel 15 Gram(s) Oral once PRN Blood Glucose LESS THAN 70 milliGRAM(s)/deciliter  diazepam    Tablet 5 milliGRAM(s) Oral every 8 hours PRN anxiety/agitaiton  diphenhydrAMINE 50 milliGRAM(s) Oral every 4 hours PRN Rash and/or Itching  glucagon  Injectable 1 milliGRAM(s) IntraMuscular once PRN Glucose LESS THAN 70 milligrams/deciliter  morphine  - Injectable 2 milliGRAM(s) IV Push every 4 hours PRN breakthrough pain  morphine  IR 15 milliGRAM(s) Oral every 4 hours PRN Moderate Pain (4 - 6)  morphine  IR 30 milliGRAM(s) Oral every 4 hours PRN Severe Pain (7 - 10)  ondansetron Injectable 4 milliGRAM(s) IV Push every 6 hours PRN Nausea and/or Vomiting  senna 2 Tablet(s) Oral at bedtime PRN Constipation      I&O's Detail    09 Sep 2020 07:01  -  10 Sep 2020 07:00  --------------------------------------------------------  IN:    Sodium Chloride 0.9% IV Bolus: 500 mL  Total IN: 500 mL    OUT:    Voided: 1000 mL  Total OUT: 1000 mL    Total NET: -500 mL      Vital Signs Last 24 Hrs  T(C): 36.7 (10 Sep 2020 09:05), Max: 36.9 (09 Sep 2020 12:39)  T(F): 98 (10 Sep 2020 09:05), Max: 98.4 (09 Sep 2020 12:39)  HR: 89 (10 Sep 2020 09:05) (76 - 100)  BP: 103/72 (10 Sep 2020 09:05) (94/54 - 123/72)  BP(mean): --  RR: 17 (10 Sep 2020 09:05) (17 - 19)  SpO2: 98% (10 Sep 2020 09:05) (98% - 99%)    General: NAD, resting comfortably in bed, chatty and working on his computer   C/V: pulses present in b/l upper and lower extremities   Pulm: Nonlabored breathing, no respiratory distress  Abd: soft, ND, NT, no rebound or guarding, incision site  minimal fluid/purulent material, dressings mostly clean and dry with some minimal yellow purulent fluid and residual packing  Extrem: WWP, no edema, no SCDs, b/l lower extremity bandages                             7.4    7.75  )-----------( 367      ( 10 Sep 2020 07:11 )             26.9   09-10    133<L>  |  105  |  24<H>  ----------------------------<  130<H>  4.5   |  20<L>  |  0.82    Ca    8.4      10 Sep 2020 07:11

## 2020-09-10 NOTE — PROGRESS NOTE ADULT - SUBJECTIVE AND OBJECTIVE BOX
Pain Management Progress Note - [facility] [phone]      HPI: Patient seen and examined today. Patient with a history of knee pain, diabetic neuropathy, CHF, HTN, MRSA bacteremia, diverticulitis, COPD, hyperkalemia, chronic systolic CHF, osteoarthritis, s/p R Revision TKA and antibiotic spacer by Dr. Castro on 7/22, now presenting with R knee pain and swelling x3 days. Patient reports R hip and R knee pain, pain managed with Morphine PO. Patient Axox3, denies any itchiness from Morphine Po. Patient reports vomiting x1 this am. Discussed plan of care with patient and ortho ream.       Pain is _x__ sharp ____dull ___burning _x__achy ___ Intensity: ____ mild _x__mod x__severe     Location _x___surgical site ____cervical _____lumbar ____abd ____upper ext____lower ext    Worse with _x___activity _x___movement _____physical therapy___ Rest    Improved with _x___medication __x__rest ____physical therapy      HYDROmorphone  Injectable  spironolactone Oral Tab/Cap - Peds  acetaminophen   Tablet ..  aspirin enteric coated  celecoxib  oxyCODONE  ER Tablet  oxyCODONE    IR  tamsulosin Oral Tab/Cap - Peds  DULoxetine  atorvastatin  prasugrel  rifAMPin  metoprolol succinate ER  silver sulfADIAZINE 1% Cream  pantoprazole    Tablet  vancomycin  IVPB  spironolactone  tamsulosin  insulin lispro (HumaLOG) corrective regimen sliding scale  dextrose 5%.  dextrose 40% Gel  dextrose 50% Injectable  dextrose 50% Injectable  dextrose 50% Injectable  glucagon  Injectable  HYDROmorphone  Injectable  HYDROmorphone   Tablet  HYDROmorphone   Tablet  HYDROmorphone  Injectable  gabapentin  cyclobenzaprine  sodium chloride 0.9% lock flush  chlorhexidine 4% Liquid  diphenhydrAMINE  spironolactone  povidone iodine 5% Nasal Swab  chlorhexidine 2% Cloths  lactated ringers.  rosuvastatin  oxyCODONE    IR  oxyCODONE    IR  vancomycin  IVPB  vancomycin  IVPB  vancomycin  IVPB  morphine  - Injectable  oxyCODONE    IR  oxyCODONE    IR  lactated ringers.  acetaminophen   Tablet ..  aluminum hydroxide/magnesium hydroxide/simethicone Suspension  ondansetron Injectable  polyethylene glycol 3350  bisacodyl Suppository  senna  HYDROmorphone  Injectable  aspirin  chewable  vancomycin  IVPB  digoxin     Tablet  prasugrel  metoprolol succinate ER  atorvastatin  spironolactone  cyclobenzaprine  gabapentin  rosuvastatin  tamsulosin  lidocaine 2% Injectable  (ADM OVERRIDE)  lidocaine 2% Injectable  dextrose 5%.  dextrose 40% Gel  dextrose 50% Injectable  dextrose 50% Injectable  dextrose 50% Injectable  glucagon  Injectable  rifAMPin  HYDROmorphone  Injectable  insulin lispro (HumaLOG) corrective regimen sliding scale  insulin glargine Injectable (LANTUS)  insulin lispro Injectable (HumaLOG)  DAPTOmycin IVPB  cefepime   IVPB  cefepime   IVPB  cefepime   IVPB  diazepam    Tablet  insulin glargine Injectable (LANTUS)  diphenhydrAMINE  insulin lispro Injectable (HumaLOG)  HYDROmorphone   Tablet  HYDROmorphone   Tablet  morphine  - Injectable  morphine  IR  morphine  IR  insulin glargine Injectable (LANTUS)  sertraline  gabapentin  gabapentin  gabapentin  sodium chloride 0.9% Bolus  levothyroxine  lactated ringers.  diphenhydrAMINE   Injectable  acetaminophen   Tablet ..  metoclopramide Injectable  pantoprazole  Injectable      ROS: Const:  -___febrile   Eyes:___ENT:___CV: __-_chest pain  Resp: __-__sob  GI:_-__nausea __-_vomiting _x_ abd pain ___npo ___clears _x_full diet __bm  :___ Musk: _x__pain _-__spasm  Skin:___ Neuro:  _-__ohvlglcs_-__ikhwicvvr_-__ numbness _-__weakness __x_paresth  Psych:_-_anxiety  Endo:___ Heme:___Allergy:_________, _x__all others reviewed and negative      PAST MEDICAL & SURGICAL HISTORY:  Diabetic neuropathy  STEMI (ST elevation myocardial infarction)  Diverticulitis  MRSA bacteremia  History of celiac disease  CHF (congestive heart failure): EF ~ 25%  HTN (hypertension)  Diabetes: on insulin pump  Blood clot due to device, implant, or graft: was on blood thinners  HLD (hyperlipidemia)  Osteoarthritis  Atherosclerosis of coronary artery: CAD (coronary artery disease)  Status post percutaneous transluminal coronary angioplasty: in 2012  History of open reduction and internal fixation (ORIF) procedure  Surgery, elective: Right shoulder  Surgery, elective: right knee wound debridement  S/P TKR (total knee replacement), right: with infection Mrsa   per pt he was cleared from MRSA infection  S/P CABG x 1: 2018  Stented coronary artery: 10/18 heart attack  INFERIOR WALL MI  Other postprocedural status: Fixation hardware in foot LEFT      09-10 @ 07:1194 mL/min/1.73M2          Hemoglobin: 7.4 g/dL (09-10 @ 07:11)  Hemoglobin: 7.8 g/dL (09-09 @ 07:20)        T(C): 36.7 (09-10-20 @ 09:05), Max: 36.7 (09-10-20 @ 06:30)  HR: 89 (09-10-20 @ 09:05) (76 - 100)  BP: 103/72 (09-10-20 @ 09:05) (94/54 - 122/80)  RR: 17 (09-10-20 @ 09:05) (17 - 19)  SpO2: 98% (09-10-20 @ 09:05) (98% - 99%)  Wt(kg): --           PHYSICAL EXAM:  Gen Appearance: x___no acute distress _x__appropriate        Neuro: _x__SILT feet____ EOM Intact Psych: AAOX__3, x___mood/affect appropriate        Eyes: __x_conjunctiva WNL  __x__ Pupils equal and round        ENT: x___ears and nose atraumatic_x__ Hearing grossly intact        Neck: _x__trachea midline, no visible masses ___thyroid without palpable mass    Resp: _x__Nml WOB____No tactile fremitus ___clear to auscultation    Cardio: ___extremities free from edema __x__pedal pulses palpable    GI/Abdomen: __x_soft ___x__ Nontender___x___Nondistended_____HSM    Lymphatic: ___no palpable nodes in neck  ___no palpable nodes calves and feet    Skin/Wound: ___Incision, _x__Dressing c/d/i,   ____surrounding tissues soft,  ___drain/chest tube present____    Muscular: EHL _4__/5  Gastrocnemius_4__/5    ___absent clubbing/cyanosis          ASSESSMENT: This is a 65y old Male with a history of knee pain, diabetic neuropathy, CHF, HTN, MRSA bacteremia, diverticulitis, COPD, hyperkalemia, chronic systolic CHF, osteoarthritis,  s/p R Revision TKA and antibiotic spacer by Dr. Castro on 7/22, now presenting with R knee pain and swelling x3 days, pain managed with current pain medication regimen.       Recommended Treatment PLAN:  1. Morphine IR 15-30mg Po Q4h prn moderate to severe pain  2. Flexeril 5mg PO Q8h prn muscle spasms  3. Gabapentin 100mg PO TID  4. Morphine 2mg IVP Q4h prn breakthrough pain  Plan discussed with Dr. Max

## 2020-09-10 NOTE — PROGRESS NOTE ADULT - SUBJECTIVE AND OBJECTIVE BOX
Interval Events: Reviewed  Patient seen and examined at bedside.    Patient is a 65y old  Male who presents with a chief complaint of R Knee Swelling (10 Sep 2020 15:35)    he is doing well  PAST MEDICAL & SURGICAL HISTORY:  Diabetic neuropathy  STEMI (ST elevation myocardial infarction)  Diverticulitis  MRSA bacteremia  History of celiac disease  CHF (congestive heart failure): EF ~ 25%  HTN (hypertension)  Diabetes: on insulin pump  Blood clot due to device, implant, or graft: was on blood thinners  HLD (hyperlipidemia)  Osteoarthritis  Atherosclerosis of coronary artery: CAD (coronary artery disease)  Status post percutaneous transluminal coronary angioplasty: in 2012  History of open reduction and internal fixation (ORIF) procedure  Surgery, elective: Right shoulder  Surgery, elective: right knee wound debridement  S/P TKR (total knee replacement), right: with infection Mrsa   per pt he was cleared from MRSA infection  S/P CABG x 1: 2018  Stented coronary artery: 10/18 heart attack  INFERIOR WALL MI  Other postprocedural status: Fixation hardware in foot LEFT      MEDICATIONS:  Pulmonary:  diphenhydrAMINE   Injectable 25 milliGRAM(s) IV Push every 6 hours PRN    Antimicrobials:  cefepime   IVPB      cefepime   IVPB 2000 milliGRAM(s) IV Intermittent every 8 hours  DAPTOmycin IVPB 675 milliGRAM(s) IV Intermittent every 24 hours  rifAMPin 300 milliGRAM(s) Oral every 12 hours    Anticoagulants:  aspirin  chewable 81 milliGRAM(s) Oral two times a day  prasugrel 10 milliGRAM(s) Oral daily    Cardiac:  digoxin     Tablet 0.125 milliGRAM(s) Oral daily  metoprolol succinate ER 25 milliGRAM(s) Oral daily  spironolactone 25 milliGRAM(s) Oral daily  tamsulosin 0.4 milliGRAM(s) Oral at bedtime      Allergies    ACE inhibitors (Hives)  carvedilol (Other)  enalapril (Hives)  Entresto (Other)    Intolerances        Vital Signs Last 24 Hrs  T(C): 36.6 (10 Sep 2020 17:39), Max: 36.7 (10 Sep 2020 06:30)  T(F): 97.8 (10 Sep 2020 17:39), Max: 98.1 (10 Sep 2020 06:30)  HR: 82 (10 Sep 2020 17:39) (77 - 89)  BP: 114/63 (10 Sep 2020 17:39) (103/72 - 114/63)  BP(mean): --  RR: 18 (10 Sep 2020 17:39) (17 - 19)  SpO2: 100% (10 Sep 2020 17:39) (98% - 100%)    09-09 @ 07:01  -  09-10 @ 07:00  --------------------------------------------------------  IN: 500 mL / OUT: 1000 mL / NET: -500 mL    09-10 @ 07:01  -  09-10 @ 21:06  --------------------------------------------------------  IN: 1980 mL / OUT: 1000 mL / NET: 980 mL          Review of Systems:   •	General: negative  •	Skin/Breast: negative  •	Ophthalmologic: negative  •	ENMT: negative  •	Respiratory and Thorax: negative  •	Cardiovascular: negative  •	Gastrointestinal: negative  •	Genitourinary: negative  •	Musculoskeletal: negative  •	Neurological: negative  •	Psychiatric: negative  •	Hematology/Lymphatics: negative  •	Endocrine: negative  •	Allergic/Immunologic: negative    Physical Exam:   • Constitutional:	Well-developed, well nourished  • Eyes:	EOMI; PERRL; no drainage or redness  • ENMT:	No oral lesions; no gross abnormalities  • Neck	No bruits; no thyromegaly or nodules  • Breasts:	not examined  • Back:	No deformity or limitation of movement  • Respiratory:	Breath Sounds equal & clear to percussion & auscultation, no accessory muscle use  • Cardiovascular:	Regular rate & rhythm, normal S1, S2; no murmurs, gallops or rubs; no S3, S4  • Gastrointestinal:	Soft, non-tender, no hepatosplenomegaly, normal bowel sounds  • Genitourinary:	not examined  • Rectal: not examined  • Extremities:	No cyanosis, clubbing or edema  • Vascular:	Equal and normal pulses (carotid, femoral, dorsalis pedis)  • Neurologica:l	not examined  • Skin:	No lesions; no rash  • Lymph Nodes:	No lymphadedenopathy  • Musculoskeletal:	No joint pain, swelling or deformity; no limitation of movement        LABS:      CBC Full  -  ( 10 Sep 2020 07:11 )  WBC Count : 7.75 K/uL  RBC Count : 3.84 M/uL  Hemoglobin : 7.4 g/dL  Hematocrit : 26.9 %  Platelet Count - Automated : 367 K/uL  Mean Cell Volume : 70.1 fl  Mean Cell Hemoglobin : 19.3 pg  Mean Cell Hemoglobin Concentration : 27.5 gm/dL  Auto Neutrophil # : x  Auto Lymphocyte # : x  Auto Monocyte # : x  Auto Eosinophil # : x  Auto Basophil # : x  Auto Neutrophil % : x  Auto Lymphocyte % : x  Auto Monocyte % : x  Auto Eosinophil % : x  Auto Basophil % : x    09-10    133<L>  |  105  |  24<H>  ----------------------------<  130<H>  4.5   |  20<L>  |  0.82    Ca    8.4      10 Sep 2020 07:11                          RADIOLOGY & ADDITIONAL STUDIES (The following images were personally reviewed):  Loja:                                     No  Urine output:                       adequate  DVT prophylaxis:                 Yes  Flattus:                                  Yes  Bowel movement:              No

## 2020-09-10 NOTE — PROGRESS NOTE ADULT - SUBJECTIVE AND OBJECTIVE BOX
Ortho Note    Pt states vomited this morning, pain controlled  Denies CP, SOB, numbness/tingling     Vital Signs Last 24 Hrs  T(C): 36.7 (09-10-20 @ 09:05), Max: 36.7 (09-10-20 @ 09:05)  T(F): 98 (09-10-20 @ 09:05), Max: 98 (09-10-20 @ 09:05)  HR: 89 (09-10-20 @ 09:05) (89 - 89)  BP: 103/72 (09-10-20 @ 09:05) (103/72 - 103/72)  BP(mean): --  RR: 17 (09-10-20 @ 09:05) (17 - 17)  SpO2: 98% (09-10-20 @ 09:05) (98% - 98%)    General: Pt Alert and oriented, NAD  DSG C/D/I RLE, LLE, Abdomen  Pulses intact b/l LE  Sensation intact b/l LE  Motor: EHL/FHL/TA/GS 5/5 b/l                          7.4    7.75  )-----------( 367      ( 10 Sep 2020 07:11 )             26.9   10 Sep 2020 07:11    133    |  105    |  24     ----------------------------<  130    4.5     |  20     |  0.82     TSH 7      A/P: 65yMale s/p Right knee abx spacer and subsequent hardware exchange  - Stable  - Pain Control  - DVT ppx asa/effient  - PT, WBS: wbat  - antiemetics ord  - f/u final stage gallium scan today  - npo/IVF for possible cholecystectomy tomorrow  - synthroid started for hypothyroid    Ortho Pager [phone]

## 2020-09-10 NOTE — PROGRESS NOTE ADULT - ASSESSMENT
65M PMHx CAD s/p CABG, CHF, DM, HLD, HTN, acute cholecystitis s/p perc sudhir 2/28, recent R TKA 7/22 presents with R knee infection. General surgery consulted for purulent drainage near prior perc sudhir tube site.     POD4 s/p bedside I&D at perc sudhir site (9/4) with improvement in erythema, induration and purulent expression. WBCs wnl, afebrile, CRP downtrending. Last LFTs were down trending    Cx from RUQ drainage showed Pseudomonas     HIDA scan was not able to visualize the gall bladder, patient was not able to tolerate delayed imaging, possible false positive finding  - gallium scan was started yesterday and i talked to radiology resident they were not sure if he needs delayed imaging or no    Plan discussed with Dr. Latif and Chief resident     - change guaze and tape daily, monitor for increased induration or purulent expression   - monitor WBCs and fevers   - FU on on completion and read for Gallium study to properly visualize the gall bladder  - Will decide regarding lap sudhir after Gallium study is completed and fully read  - Team 4c will continue to follow 65M PMHx CAD s/p CABG, CHF, DM, HLD, HTN, acute cholecystitis s/p perc sudhir 2/28, recent R TKA 7/22 presents with R knee infection. General surgery consulted for purulent drainage near prior perc sudhir tube site.     POD4 s/p bedside I&D at perc sudhir site (9/4) with improvement in erythema, induration and purulent expression. WBCs wnl, afebrile, CRP downtrending. Last LFTs were down trending    Cx from RUQ drainage showed Pseudomonas     HIDA scan was not able to visualize the gall bladder, patient was not able to tolerate delayed imaging, possible false positive finding  - gallium scan was started yesterday and i talked to radiology resident they were not sure if he needs delayed imaging or no    Plan discussed with Dr. Latif and Chief resident     - change guaze and tape daily, monitor for increased induration or purulent expression   - monitor WBCs and fevers   - FU on on completion and read for Gallium study to properly visualize the gall bladder  - Will decide regarding lap sudhir after Gallium study is completed and fully read  - Team 4c will continue to follow       Update 19:00 PM on 09/10/2020:    Gallium study didn't confirm the presence of a tract between GB and SQ collection  Discussed with Dr. Latif and Chief resident   No indication for lap sudhir as of now, given other medical problems are procedures the patient is currently undergoing  - Surgery will continue to Follow for dressing change of the I&D

## 2020-09-10 NOTE — PROGRESS NOTE ADULT - PROBLEM SELECTOR PLAN 1
on broad spectrum antibiotics,   f/u intraop c/s  f/u vanco level and vanco was adjusted  I will discuss with ortho regarding the recent cultures from the right hip and is negative but the abdominal wound cultures are positive fro VREF and Pseudomonas.  He is on vanco and Rifampin  culture positive for MRSA with negative blood culture repeat but 4 bottles were positive on admission  JOHN PAUL unlikely endocarditis but might need to be treated as such.  The AICD bed is stable with no signs of infection  On Vanco and Rifampin, PICC line Monday.   hardware removed and follow on cultures which is negative to date  he is stable and he has area of necrosis over the wound and is followed by plastics  sepsis resolved and he is on antibiotic  I discussed with surgery and ID.  he is daptomycin and will follow with gallium scan  results of the hida reviewed and discussed with ID .  I discussed gallium scan with surgery and no evidence of cholecystitis.  Possible cholecysrectomy

## 2020-09-11 LAB
ALBUMIN SERPL ELPH-MCNC: 2.9 G/DL — LOW (ref 3.3–5)
ALP SERPL-CCNC: 135 U/L — HIGH (ref 40–120)
ALT FLD-CCNC: 15 U/L — SIGNIFICANT CHANGE UP (ref 10–45)
ANION GAP SERPL CALC-SCNC: 9 MMOL/L — SIGNIFICANT CHANGE UP (ref 5–17)
AST SERPL-CCNC: 18 U/L — SIGNIFICANT CHANGE UP (ref 10–40)
BASOPHILS # BLD AUTO: 0.08 K/UL — SIGNIFICANT CHANGE UP (ref 0–0.2)
BASOPHILS NFR BLD AUTO: 0.9 % — SIGNIFICANT CHANGE UP (ref 0–2)
BILIRUB SERPL-MCNC: 0.4 MG/DL — SIGNIFICANT CHANGE UP (ref 0.2–1.2)
BUN SERPL-MCNC: 27 MG/DL — HIGH (ref 7–23)
CALCIUM SERPL-MCNC: 8.8 MG/DL — SIGNIFICANT CHANGE UP (ref 8.4–10.5)
CHLORIDE SERPL-SCNC: 105 MMOL/L — SIGNIFICANT CHANGE UP (ref 96–108)
CO2 SERPL-SCNC: 20 MMOL/L — LOW (ref 22–31)
CREAT SERPL-MCNC: 1 MG/DL — SIGNIFICANT CHANGE UP (ref 0.5–1.3)
CRP SERPL-MCNC: 2.26 MG/DL — HIGH (ref 0–0.4)
EOSINOPHIL # BLD AUTO: 1.02 K/UL — HIGH (ref 0–0.5)
EOSINOPHIL NFR BLD AUTO: 11.3 % — HIGH (ref 0–6)
ERYTHROCYTE [SEDIMENTATION RATE] IN BLOOD: 23 MM/HR — HIGH
GLUCOSE BLDC GLUCOMTR-MCNC: 115 MG/DL — HIGH (ref 70–99)
GLUCOSE BLDC GLUCOMTR-MCNC: 131 MG/DL — HIGH (ref 70–99)
GLUCOSE BLDC GLUCOMTR-MCNC: 133 MG/DL — HIGH (ref 70–99)
GLUCOSE BLDC GLUCOMTR-MCNC: 98 MG/DL — SIGNIFICANT CHANGE UP (ref 70–99)
GLUCOSE SERPL-MCNC: 129 MG/DL — HIGH (ref 70–99)
HCT VFR BLD CALC: 29.6 % — LOW (ref 39–50)
HGB BLD-MCNC: 8.2 G/DL — LOW (ref 13–17)
LYMPHOCYTES # BLD AUTO: 1.33 K/UL — SIGNIFICANT CHANGE UP (ref 1–3.3)
LYMPHOCYTES # BLD AUTO: 14.8 % — SIGNIFICANT CHANGE UP (ref 13–44)
MCHC RBC-ENTMCNC: 19.2 PG — LOW (ref 27–34)
MCHC RBC-ENTMCNC: 27.7 GM/DL — LOW (ref 32–36)
MCV RBC AUTO: 69.2 FL — LOW (ref 80–100)
MONOCYTES # BLD AUTO: 1.02 K/UL — HIGH (ref 0–0.9)
MONOCYTES NFR BLD AUTO: 11.3 % — SIGNIFICANT CHANGE UP (ref 2–14)
NEUTROPHILS # BLD AUTO: 5.57 K/UL — SIGNIFICANT CHANGE UP (ref 1.8–7.4)
NEUTROPHILS NFR BLD AUTO: 61.7 % — SIGNIFICANT CHANGE UP (ref 43–77)
NRBC # BLD: SIGNIFICANT CHANGE UP /100 WBCS (ref 0–0)
PLATELET # BLD AUTO: 368 K/UL — SIGNIFICANT CHANGE UP (ref 150–400)
POTASSIUM SERPL-MCNC: 4.8 MMOL/L — SIGNIFICANT CHANGE UP (ref 3.5–5.3)
POTASSIUM SERPL-SCNC: 4.8 MMOL/L — SIGNIFICANT CHANGE UP (ref 3.5–5.3)
PROT SERPL-MCNC: 6.6 G/DL — SIGNIFICANT CHANGE UP (ref 6–8.3)
RBC # BLD: 4.28 M/UL — SIGNIFICANT CHANGE UP (ref 4.2–5.8)
RBC # FLD: 19.8 % — HIGH (ref 10.3–14.5)
SODIUM SERPL-SCNC: 134 MMOL/L — LOW (ref 135–145)
WBC # BLD: 9.02 K/UL — SIGNIFICANT CHANGE UP (ref 3.8–10.5)
WBC # FLD AUTO: 9.02 K/UL — SIGNIFICANT CHANGE UP (ref 3.8–10.5)

## 2020-09-11 PROCEDURE — 99232 SBSQ HOSP IP/OBS MODERATE 35: CPT | Mod: GC

## 2020-09-11 PROCEDURE — 99232 SBSQ HOSP IP/OBS MODERATE 35: CPT

## 2020-09-11 RX ORDER — INSULIN GLARGINE 100 [IU]/ML
20 INJECTION, SOLUTION SUBCUTANEOUS AT BEDTIME
Refills: 0 | Status: DISCONTINUED | OUTPATIENT
Start: 2020-09-11 | End: 2020-09-11

## 2020-09-11 RX ORDER — INSULIN GLARGINE 100 [IU]/ML
10 INJECTION, SOLUTION SUBCUTANEOUS AT BEDTIME
Refills: 0 | Status: DISCONTINUED | OUTPATIENT
Start: 2020-09-11 | End: 2020-09-18

## 2020-09-11 RX ADMIN — PANTOPRAZOLE SODIUM 40 MILLIGRAM(S): 20 TABLET, DELAYED RELEASE ORAL at 12:06

## 2020-09-11 RX ADMIN — CEFEPIME 100 MILLIGRAM(S): 1 INJECTION, POWDER, FOR SOLUTION INTRAMUSCULAR; INTRAVENOUS at 00:59

## 2020-09-11 RX ADMIN — CEFEPIME 100 MILLIGRAM(S): 1 INJECTION, POWDER, FOR SOLUTION INTRAMUSCULAR; INTRAVENOUS at 16:06

## 2020-09-11 RX ADMIN — Medication 25 MILLIGRAM(S): at 03:59

## 2020-09-11 RX ADMIN — MORPHINE SULFATE 30 MILLIGRAM(S): 50 CAPSULE, EXTENDED RELEASE ORAL at 14:38

## 2020-09-11 RX ADMIN — Medication 81 MILLIGRAM(S): at 17:06

## 2020-09-11 RX ADMIN — GABAPENTIN 200 MILLIGRAM(S): 400 CAPSULE ORAL at 23:57

## 2020-09-11 RX ADMIN — MORPHINE SULFATE 30 MILLIGRAM(S): 50 CAPSULE, EXTENDED RELEASE ORAL at 00:30

## 2020-09-11 RX ADMIN — DAPTOMYCIN 127 MILLIGRAM(S): 500 INJECTION, POWDER, LYOPHILIZED, FOR SOLUTION INTRAVENOUS at 01:59

## 2020-09-11 RX ADMIN — GABAPENTIN 100 MILLIGRAM(S): 400 CAPSULE ORAL at 13:46

## 2020-09-11 RX ADMIN — CEFEPIME 100 MILLIGRAM(S): 1 INJECTION, POWDER, FOR SOLUTION INTRAMUSCULAR; INTRAVENOUS at 08:59

## 2020-09-11 RX ADMIN — MORPHINE SULFATE 30 MILLIGRAM(S): 50 CAPSULE, EXTENDED RELEASE ORAL at 10:01

## 2020-09-11 RX ADMIN — Medication 8 UNIT(S): at 09:18

## 2020-09-11 RX ADMIN — GABAPENTIN 100 MILLIGRAM(S): 400 CAPSULE ORAL at 06:57

## 2020-09-11 RX ADMIN — Medication 50 MICROGRAM(S): at 06:57

## 2020-09-11 RX ADMIN — Medication 8 UNIT(S): at 18:24

## 2020-09-11 RX ADMIN — Medication 25 MILLIGRAM(S): at 06:57

## 2020-09-11 RX ADMIN — Medication 0: at 22:00

## 2020-09-11 RX ADMIN — SPIRONOLACTONE 25 MILLIGRAM(S): 25 TABLET, FILM COATED ORAL at 06:57

## 2020-09-11 RX ADMIN — POLYETHYLENE GLYCOL 3350 17 GRAM(S): 17 POWDER, FOR SOLUTION ORAL at 12:06

## 2020-09-11 RX ADMIN — Medication 81 MILLIGRAM(S): at 06:57

## 2020-09-11 RX ADMIN — MORPHINE SULFATE 30 MILLIGRAM(S): 50 CAPSULE, EXTENDED RELEASE ORAL at 09:14

## 2020-09-11 RX ADMIN — MORPHINE SULFATE 30 MILLIGRAM(S): 50 CAPSULE, EXTENDED RELEASE ORAL at 13:51

## 2020-09-11 RX ADMIN — PRASUGREL 10 MILLIGRAM(S): 5 TABLET, FILM COATED ORAL at 12:06

## 2020-09-11 RX ADMIN — Medication 0.12 MILLIGRAM(S): at 06:57

## 2020-09-11 RX ADMIN — TAMSULOSIN HYDROCHLORIDE 0.4 MILLIGRAM(S): 0.4 CAPSULE ORAL at 23:57

## 2020-09-11 RX ADMIN — INSULIN GLARGINE 10 UNIT(S): 100 INJECTION, SOLUTION SUBCUTANEOUS at 23:35

## 2020-09-11 RX ADMIN — ROSUVASTATIN CALCIUM 10 MILLIGRAM(S): 5 TABLET ORAL at 23:52

## 2020-09-11 RX ADMIN — SERTRALINE 25 MILLIGRAM(S): 25 TABLET, FILM COATED ORAL at 12:06

## 2020-09-11 NOTE — PROGRESS NOTE ADULT - SUBJECTIVE AND OBJECTIVE BOX
Interval Events: Reviewed  Patient seen and examined at bedside.    Patient is a 65y old  Male who presents with a chief complaint of R Knee Swelling (11 Sep 2020 17:11)    he is doing well  PAST MEDICAL & SURGICAL HISTORY:  Diabetic neuropathy  STEMI (ST elevation myocardial infarction)  Diverticulitis  MRSA bacteremia  History of celiac disease  CHF (congestive heart failure): EF ~ 25%  HTN (hypertension)  Diabetes: on insulin pump  Blood clot due to device, implant, or graft: was on blood thinners  HLD (hyperlipidemia)  Osteoarthritis  Atherosclerosis of coronary artery: CAD (coronary artery disease)  Status post percutaneous transluminal coronary angioplasty: in 2012  History of open reduction and internal fixation (ORIF) procedure  Surgery, elective: Right shoulder  Surgery, elective: right knee wound debridement  S/P TKR (total knee replacement), right: with infection Mrsa   per pt he was cleared from MRSA infection  S/P CABG x 1: 2018  Stented coronary artery: 10/18 heart attack  INFERIOR WALL MI  Other postprocedural status: Fixation hardware in foot LEFT      MEDICATIONS:  Pulmonary:  diphenhydrAMINE   Injectable 25 milliGRAM(s) IV Push every 6 hours PRN    Antimicrobials:  cefepime   IVPB      cefepime   IVPB 2000 milliGRAM(s) IV Intermittent every 8 hours  DAPTOmycin IVPB 675 milliGRAM(s) IV Intermittent every 24 hours  rifAMPin 300 milliGRAM(s) Oral every 12 hours    Anticoagulants:  aspirin  chewable 81 milliGRAM(s) Oral two times a day  prasugrel 10 milliGRAM(s) Oral daily    Cardiac:  digoxin     Tablet 0.125 milliGRAM(s) Oral daily  metoprolol succinate ER 25 milliGRAM(s) Oral daily  spironolactone 25 milliGRAM(s) Oral daily  tamsulosin 0.4 milliGRAM(s) Oral at bedtime      Allergies    ACE inhibitors (Hives)  carvedilol (Other)  enalapril (Hives)  Entresto (Other)    Intolerances        Vital Signs Last 24 Hrs  T(C): 36.6 (11 Sep 2020 16:10), Max: 36.8 (10 Sep 2020 21:24)  T(F): 97.9 (11 Sep 2020 16:10), Max: 98.3 (10 Sep 2020 21:24)  HR: 76 (11 Sep 2020 16:10) (76 - 90)  BP: 116/61 (11 Sep 2020 16:10) (100/56 - 121/58)  BP(mean): --  RR: 18 (11 Sep 2020 16:10) (18 - 19)  SpO2: 100% (11 Sep 2020 16:10) (96% - 100%)    09-10 @ 07:01  -  09-11 @ 07:00  --------------------------------------------------------  IN: 1980 mL / OUT: 1550 mL / NET: 430 mL          Review of Systems:   •	General: negative  •	Skin/Breast: negative  •	Ophthalmologic: negative  •	ENMT: negative  •	Respiratory and Thorax: negative  •	Cardiovascular: negative  •	Gastrointestinal: negative  •	Genitourinary: negative  •	Musculoskeletal: negative  •	Neurological: negative  •	Psychiatric: negative  •	Hematology/Lymphatics: negative  •	Endocrine: negative  •	Allergic/Immunologic: negative    Physical Exam:   • Constitutional:	Well-developed, well nourished  • Eyes:	EOMI; PERRL; no drainage or redness  • ENMT:	No oral lesions; no gross abnormalities  • Neck	No bruits; no thyromegaly or nodules  • Breasts:	not examined  • Back:	No deformity or limitation of movement  • Respiratory:	Breath Sounds equal & clear to percussion & auscultation, no accessory muscle use  • Cardiovascular:	Regular rate & rhythm, normal S1, S2; no murmurs, gallops or rubs; no S3, S4  • Gastrointestinal:	Soft, non-tender, no hepatosplenomegaly, normal bowel sounds  • Genitourinary:	not examined  • Rectal: not examined  • Extremities:	No cyanosis, clubbing or edema  • Vascular:	Equal and normal pulses (carotid, femoral, dorsalis pedis)  • Neurologica:l	not examined  • Skin:	No lesions; no rash  • Lymph Nodes:	No lymphadedenopathy  • Musculoskeletal:	No joint pain, swelling or deformity; no limitation of movement        LABS:      CBC Full  -  ( 11 Sep 2020 06:24 )  WBC Count : 9.02 K/uL  RBC Count : 4.28 M/uL  Hemoglobin : 8.2 g/dL  Hematocrit : 29.6 %  Platelet Count - Automated : 368 K/uL  Mean Cell Volume : 69.2 fl  Mean Cell Hemoglobin : 19.2 pg  Mean Cell Hemoglobin Concentration : 27.7 gm/dL  Auto Neutrophil # : 5.57 K/uL  Auto Lymphocyte # : 1.33 K/uL  Auto Monocyte # : 1.02 K/uL  Auto Eosinophil # : 1.02 K/uL  Auto Basophil # : 0.08 K/uL  Auto Neutrophil % : 61.7 %  Auto Lymphocyte % : 14.8 %  Auto Monocyte % : 11.3 %  Auto Eosinophil % : 11.3 %  Auto Basophil % : 0.9 %    09-11    134<L>  |  105  |  27<H>  ----------------------------<  129<H>  4.8   |  20<L>  |  1.00    Ca    8.8      11 Sep 2020 06:24    TPro  6.6  /  Alb  2.9<L>  /  TBili  0.4  /  DBili  x   /  AST  18  /  ALT  15  /  AlkPhos  135<H>  09-11                        RADIOLOGY & ADDITIONAL STUDIES (The following images were personally reviewed):  Loja:                                     No  Urine output:                       adequate  DVT prophylaxis:                 Yes  Flattus:                                  Yes  Bowel movement:              No

## 2020-09-11 NOTE — PROGRESS NOTE ADULT - SUBJECTIVE AND OBJECTIVE BOX
Ortho Note    Pt complains of mild bilateral leg stiffness, otherwise pain well controlled.  Denies CP, SOB, numbness/tingling     Vital Signs Last 24 Hrs  T(C): 36.6 (11 Sep 2020 01:01), Max: 36.8 (10 Sep 2020 21:24)  T(F): 97.9 (11 Sep 2020 01:01), Max: 98.3 (10 Sep 2020 21:24)  HR: 90 (11 Sep 2020 01:01) (77 - 90)  BP: 121/58 (11 Sep 2020 01:01) (100/56 - 121/58)  BP(mean): --  RR: 18 (11 Sep 2020 01:01) (17 - 19)  SpO2: 99% (11 Sep 2020 01:01) (98% - 100%)    General: Pt Alert and oriented, NAD  DSG C/D/I RLE, LLE, Abdomen  Pulses intact b/l LE  Sensation intact b/l LE  Motor: EHL/FHL/TA/GS 5/5 b/l                          7.4    7.75  )-----------( 367      ( 10 Sep 2020 07:11 )             26.9   10 Sep 2020 07:11    133    |  105    |  24     ----------------------------<  130    4.5     |  20     |  0.82     TSH 7      A/P: 65yMale s/p Right knee abx spacer and subsequent hardware exchange  - Stable  - Pain Control  - DVT ppx asa/effient  - PT, WBS: wbat  - As per Gen surg, no cholecystectomy after final gallium scan read, f/u recs  - synthroid started for hypothyroid  - OR cultures NGTD     Ortho Pager [phone]

## 2020-09-11 NOTE — PROGRESS NOTE ADULT - ASSESSMENT
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  He has completed 6 weeks of vancomycin & rifampin.  He is s/p R hip exchange of hardware on 9/4 - OR cultures NGTD.  I&D of abd wall done 9/4 - cultures growing Pseudomonas and Enterococcus faecium. Gallium SPECT done 9/10 showed minimal uptake from GB fossa to soft tissue and was interpreted by surgery as a negative study. Continue daptomycin (repeat CK Mon 9/14), rifampin, cefepime; continue to monitor CBC with differential given peripheral eosinophilia and pruritus--monitor for development of rash.    Dr. Vaughan to cover tonight through Sunday 9/13; I return Monday 9/14

## 2020-09-11 NOTE — PROGRESS NOTE ADULT - SUBJECTIVE AND OBJECTIVE BOX
The patient had no acute issues overnight.  No leaking around the dressing.    MEDICATIONS  (STANDING):  aspirin  chewable 81 milliGRAM(s) Oral two times a day  cefepime   IVPB      cefepime   IVPB 2000 milliGRAM(s) IV Intermittent every 8 hours  DAPTOmycin IVPB 675 milliGRAM(s) IV Intermittent every 24 hours  dextrose 5%. 1000 milliLiter(s) (50 mL/Hr) IV Continuous <Continuous>  dextrose 50% Injectable 12.5 Gram(s) IV Push once  dextrose 50% Injectable 25 Gram(s) IV Push once  dextrose 50% Injectable 25 Gram(s) IV Push once  digoxin     Tablet 0.125 milliGRAM(s) Oral daily  gabapentin 100 milliGRAM(s) Oral <User Schedule>  gabapentin 200 milliGRAM(s) Oral at bedtime  insulin glargine Injectable (LANTUS) 10 Unit(s) SubCutaneous at bedtime  insulin lispro (HumaLOG) corrective regimen sliding scale   SubCutaneous Before meals and at bedtime  insulin lispro Injectable (HumaLOG) 8 Unit(s) SubCutaneous three times a day before meals  lactated ringers. 1000 milliLiter(s) (50 mL/Hr) IV Continuous <Continuous>  levothyroxine 50 MICROGram(s) Oral daily  lidocaine 2% Injectable 10 milliLiter(s) Local Injection once  metoprolol succinate ER 25 milliGRAM(s) Oral daily  pantoprazole  Injectable 40 milliGRAM(s) IV Push daily  polyethylene glycol 3350 17 Gram(s) Oral daily  prasugrel 10 milliGRAM(s) Oral daily  rifAMPin 300 milliGRAM(s) Oral every 12 hours  rosuvastatin 10 milliGRAM(s) Oral at bedtime  sertraline 25 milliGRAM(s) Oral daily  spironolactone 25 milliGRAM(s) Oral daily  tamsulosin 0.4 milliGRAM(s) Oral at bedtime    MEDICATIONS  (PRN):  acetaminophen   Tablet .. 650 milliGRAM(s) Oral every 6 hours PRN Temp greater or equal to 38C (100.4F), Mild Pain (1 - 3)  aluminum hydroxide/magnesium hydroxide/simethicone Suspension 30 milliLiter(s) Oral four times a day PRN Indigestion  bisacodyl Suppository 10 milliGRAM(s) Rectal daily PRN If no bowel movement by POD#2  cyclobenzaprine 5 milliGRAM(s) Oral three times a day PRN Muscle Spasm  dextrose 40% Gel 15 Gram(s) Oral once PRN Blood Glucose LESS THAN 70 milliGRAM(s)/deciliter  diazepam    Tablet 5 milliGRAM(s) Oral every 8 hours PRN anxiety/agitaiton  diphenhydrAMINE   Injectable 25 milliGRAM(s) IV Push every 6 hours PRN Rash and/or Itching  glucagon  Injectable 1 milliGRAM(s) IntraMuscular once PRN Glucose LESS THAN 70 milligrams/deciliter  metoclopramide Injectable 10 milliGRAM(s) IV Push every 6 hours PRN breakthrough nausea  morphine  - Injectable 2 milliGRAM(s) IV Push every 4 hours PRN breakthrough pain  morphine  IR 15 milliGRAM(s) Oral every 4 hours PRN Moderate Pain (4 - 6)  morphine  IR 30 milliGRAM(s) Oral every 4 hours PRN Severe Pain (7 - 10)  ondansetron Injectable 4 milliGRAM(s) IV Push every 6 hours PRN Nausea and/or Vomiting  senna 2 Tablet(s) Oral at bedtime PRN Constipation      I&O's Detail    10 Sep 2020 07:01  -  11 Sep 2020 07:00  --------------------------------------------------------  IN:    lactated ringers.: 400 mL    Oral Fluid: 1580 mL  Total IN: 1980 mL    OUT:    Voided: 1550 mL  Total OUT: 1550 mL    Total NET: 430 mL      Vital Signs Last 24 Hrs  T(C): 36.7 (11 Sep 2020 09:05), Max: 36.8 (10 Sep 2020 21:24)  T(F): 98 (11 Sep 2020 09:05), Max: 98.3 (10 Sep 2020 21:24)  HR: 88 (11 Sep 2020 09:05) (79 - 90)  BP: 107/59 (11 Sep 2020 09:05) (100/56 - 121/58)  BP(mean): --  RR: 18 (11 Sep 2020 09:05) (18 - 19)  SpO2: 96% (11 Sep 2020 09:05) (96% - 100%)    General: NAD, resting comfortably in bed, chatty and working on his computer   C/V: pulses present in b/l upper and lower extremities   Pulm: Nonlabored breathing, no respiratory distress  Abd: soft, ND, NT, no rebound or guarding, incision site  minimal fluid/purulent material,  yellow purulent fluid on the dressing.  Extrem: WWP, no edema, no SCDs, b/l lower extremity bandages                           8.2    9.02  )-----------( 368      ( 11 Sep 2020 06:24 )             29.6   09-11    134<L>  |  105  |  27<H>  ----------------------------<  129<H>  4.8   |  20<L>  |  1.00    Ca    8.8      11 Sep 2020 06:24    TPro  6.6  /  Alb  2.9<L>  /  TBili  0.4  /  DBili  x   /  AST  18  /  ALT  15  /  AlkPhos  135<H>  09-11

## 2020-09-11 NOTE — PROGRESS NOTE ADULT - PROBLEM SELECTOR PLAN 1
on broad spectrum antibiotics,   f/u intraop c/s  f/u vanco level and vanco was adjusted  I will discuss with ortho regarding the recent cultures from the right hip and is negative but the abdominal wound cultures are positive fro VREF and Pseudomonas.  He is on vanco and Rifampin  culture positive for MRSA with negative blood culture repeat but 4 bottles were positive on admission  JOHN PAUL unlikely endocarditis but might need to be treated as such.  The AICD bed is stable with no signs of infection  On Vanco and Rifampin, PICC line Monday.   hardware removed and follow on cultures which is negative to date  he is stable and he has area of necrosis over the wound and is followed by plastics  sepsis resolved and he is on antibiotic  I discussed with surgery and ID.  he is daptomycin and will follow with gallium scan  results of the hida reviewed and discussed with ID .  I discussed gallium scan with surgery and no evidence of cholecystitis.  not for surgery

## 2020-09-11 NOTE — PROGRESS NOTE ADULT - SUBJECTIVE AND OBJECTIVE BOX
Orthopaedic Surgery Progress Note    S/p R knee antibiotic spacer 07/22 who presented with R knee swelling and RUQ wound drainage on 09/01/2020 and R hip exchange of hardware on 09/04/2020    Subjective:     Patient seen and examined. Patient comfortable. States his pain is "not worse". He is glad he likely doesn't need a lap sudhir  Denies chest pain, shortness of breath, nausea/vomiting, numbness/tingling.    Objective:    Vital Signs Last 24 Hrs  T(C): 36.7 (09-11-20 @ 09:05), Max: 36.7 (09-11-20 @ 09:05)  T(F): 98 (09-11-20 @ 09:05), Max: 98 (09-11-20 @ 09:05)  HR: 88 (09-11-20 @ 09:05) (88 - 88)  BP: 107/59 (09-11-20 @ 09:05) (107/59 - 107/59)  RR: 18 (09-11-20 @ 09:05) (18 - 18)  SpO2: 96% (09-11-20 @ 09:05) (96% - 96%)  AVSS    PE:  General: Patient alert and oriented, NAD  RLE: R hip dressing c/d/i. R knee with 2cm area of granulation tissue and fibrinous tissue w/o tenderness or erythema, proximal and distal wound edges are healed well,   LLE: Anterior tibia with skin graft s/p debridement by plastic surgery, no erythema  Pulses: 2+ DP BLE  Sensation: SILT BLE   Motor: EHL/FHL/TA/GS 5/5 BLE                          8.2    9.02  )-----------( 368      ( 11 Sep 2020 06:24 )             29.6   11 Sep 2020 06:24    134    |  105    |  27     ----------------------------<  129    4.8     |  20     |  1.00       TPro  6.6    /  Alb  2.9    /  TBili  0.4    /  DBili  x      /  AST  18     /  ALT  15     /  AlkPhos  135    11 Sep 2020 06:24      A/P: 65yMale s/p R knee antibiotic spacer 07/22 who presented with R knee swelling and RUQ wound drainage on 09/01/2020 and R hip exchange of hardware on 09/04/2020    1. Pain control as needed. Appreciate pain management recs.   2. DVT prophylaxis: ASA, Effient   3. PT, weight-bearing status: WBAT BLE   4. Appreciate Gen Surg recs for RUQ wound. Pt feels that wound is improving. HIDA scan inconclusive. Gallium scan does not show a tract. No surgery right now per Gen Surg.  5. Continue antibiotics. Appreciate ID recs.   6. Left leg wound managed by plastics and vascular. Wound care consulted. Appreciate vascular and plastics recs.  7. Continue sertraline and gabapentin per psych. Appreciate recs.  8. Dispo: OR next week for R knee spacer exchange and gastroc flap with plastics    Ortho Pager [phone]

## 2020-09-11 NOTE — PROGRESS NOTE ADULT - SUBJECTIVE AND OBJECTIVE BOX
Pain Management Progress Note - [facility] [phone]      HPI: Patient seen and examined today. Patient with a history of knee pain, diabetic neuropathy, CHF, HTN, MRSA bacteremia, diverticulitis, COPD, hyperkalemia, chronic systolic CHF, osteoarthritis, s/p R Revision TKA and antibiotic spacer by Dr. Castro on 7/22, now presenting with R knee pain and swelling x3 days. Patient reports R hip and R knee pain, pain managed with Morphine PO. Patient Axox3, denies any itchiness from Morphine Po.  Reviewed pain medication regimen with patient at bedside.       Pain is _x__ sharp ____dull ___burning _x__achy ___ Intensity: ____ mild _x__mod x__severe     Location _x___surgical site ____cervical _____lumbar ____abd ____upper ext____lower ext    Worse with _x___activity _x___movement _____physical therapy___ Rest    Improved with _x___medication __x__rest ____physical therapy        HYDROmorphone  Injectable  spironolactone Oral Tab/Cap - Peds  acetaminophen   Tablet ..  aspirin enteric coated  celecoxib  oxyCODONE  ER Tablet  oxyCODONE    IR  tamsulosin Oral Tab/Cap - Peds  DULoxetine  atorvastatin  prasugrel  rifAMPin  metoprolol succinate ER  silver sulfADIAZINE 1% Cream  pantoprazole    Tablet  vancomycin  IVPB  spironolactone  tamsulosin  insulin lispro (HumaLOG) corrective regimen sliding scale  dextrose 5%.  dextrose 40% Gel  dextrose 50% Injectable  dextrose 50% Injectable  dextrose 50% Injectable  glucagon  Injectable  HYDROmorphone  Injectable  HYDROmorphone   Tablet  HYDROmorphone   Tablet  HYDROmorphone  Injectable  gabapentin  cyclobenzaprine  sodium chloride 0.9% lock flush  chlorhexidine 4% Liquid  diphenhydrAMINE  spironolactone  povidone iodine 5% Nasal Swab  chlorhexidine 2% Cloths  lactated ringers.  rosuvastatin  oxyCODONE    IR  oxyCODONE    IR  vancomycin  IVPB  vancomycin  IVPB  vancomycin  IVPB  morphine  - Injectable  oxyCODONE    IR  oxyCODONE    IR  lactated ringers.  acetaminophen   Tablet ..  aluminum hydroxide/magnesium hydroxide/simethicone Suspension  ondansetron Injectable  polyethylene glycol 3350  bisacodyl Suppository  senna  HYDROmorphone  Injectable  aspirin  chewable  vancomycin  IVPB  digoxin     Tablet  prasugrel  metoprolol succinate ER  atorvastatin  spironolactone  cyclobenzaprine  gabapentin  rosuvastatin  tamsulosin  lidocaine 2% Injectable  lidocaine 2% Injectable  dextrose 5%.  dextrose 40% Gel  dextrose 50% Injectable  dextrose 50% Injectable  dextrose 50% Injectable  glucagon  Injectable  rifAMPin  HYDROmorphone  Injectable  insulin lispro (HumaLOG) corrective regimen sliding scale  insulin glargine Injectable (LANTUS)  insulin lispro Injectable (HumaLOG)  DAPTOmycin IVPB  cefepime   IVPB  cefepime   IVPB  cefepime   IVPB  diazepam    Tablet  (Floorstock)  (Floorstock)  insulin glargine Injectable (LANTUS)  diphenhydrAMINE  insulin lispro Injectable (HumaLOG)  (ADM OVERRIDE)  HYDROmorphone   Tablet  HYDROmorphone   Tablet  morphine  - Injectable  morphine  IR  morphine  IR  (ADM OVERRIDE)  insulin glargine Injectable (LANTUS)  sertraline  gabapentin  gabapentin  gabapentin  sodium chloride 0.9% Bolus  levothyroxine  lactated ringers.  diphenhydrAMINE   Injectable  acetaminophen   Tablet ..  metoclopramide Injectable  pantoprazole  Injectable  insulin glargine Injectable (LANTUS)      ROS: Const:  -___febrile   Eyes:___ENT:___CV: __-_chest pain  Resp: __-__sob  GI:_-__nausea __-_vomiting _x_ abd pain ___npo ___clears _x_full diet __bm  :___ Musk: _x__pain _-__spasm  Skin:___ Neuro:  _-__wgcsxnaw_-__nvbozuvlw_-__ numbness _-__weakness __x_paresth  Psych:_-_anxiety  Endo:___ Heme:___Allergy:_________, _x__all others reviewed and negative      PAST MEDICAL & SURGICAL HISTORY:  Diabetic neuropathy  STEMI (ST elevation myocardial infarction)  Diverticulitis  MRSA bacteremia  History of celiac disease  CHF (congestive heart failure): EF ~ 25%  HTN (hypertension)  Diabetes: on insulin pump  Blood clot due to device, implant, or graft: was on blood thinners  HLD (hyperlipidemia)  Osteoarthritis  Atherosclerosis of coronary artery: CAD (coronary artery disease)  Status post percutaneous transluminal coronary angioplasty: in 2012  History of open reduction and internal fixation (ORIF) procedure  Surgery, elective: Right shoulder  Surgery, elective: right knee wound debridement  S/P TKR (total knee replacement), right: with infection Mrsa   per pt he was cleared from MRSA infection  S/P CABG x 1: 2018  Stented coronary artery: 10/18 heart attack  INFERIOR WALL MI  Other postprocedural status: Fixation hardware in foot LEFT        09-11 @ 06:2480 mL/min/1.73M2      Hemoglobin: 8.2 g/dL (09-11 @ 06:24)  Hemoglobin: 7.4 g/dL (09-10 @ 07:11)        T(C): 36.6 (09-11-20 @ 16:10), Max: 36.8 (09-10-20 @ 21:24)  HR: 76 (09-11-20 @ 16:10) (76 - 90)  BP: 116/61 (09-11-20 @ 16:10) (100/56 - 121/58)  RR: 18 (09-11-20 @ 16:10) (18 - 19)  SpO2: 100% (09-11-20 @ 16:10) (96% - 100%)  Wt(kg): --            PHYSICAL EXAM:  Gen Appearance: x___no acute distress _x__appropriate        Neuro: _x__SILT feet____ EOM Intact Psych: AAOX__3, x___mood/affect appropriate        Eyes: __x_conjunctiva WNL  __x__ Pupils equal and round        ENT: x___ears and nose atraumatic_x__ Hearing grossly intact        Neck: _x__trachea midline, no visible masses ___thyroid without palpable mass    Resp: _x__Nml WOB____No tactile fremitus ___clear to auscultation    Cardio: ___extremities free from edema __x__pedal pulses palpable    GI/Abdomen: __x_soft ___x__ Nontender___x___Nondistended_____HSM    Lymphatic: ___no palpable nodes in neck  ___no palpable nodes calves and feet    Skin/Wound: ___Incision, _x__Dressing c/d/i,   ____surrounding tissues soft,  ___drain/chest tube present____    Muscular: EHL _4__/5  Gastrocnemius_4__/5    ___absent clubbing/cyanosis          ASSESSMENT: This is a 65y old Male with a history of knee pain, diabetic neuropathy, CHF, HTN, MRSA bacteremia, diverticulitis, COPD, hyperkalemia, chronic systolic CHF, osteoarthritis,  s/p R Revision TKA and antibiotic spacer by Dr. Castro on 7/22, now presenting with R knee pain and swelling x3 days, pain managed with current pain medication regimen.       Recommended Treatment PLAN:  1. Morphine IR 15-30mg Po Q4h prn moderate to severe pain  2. Flexeril 5mg PO Q8h prn muscle spasms  3. Gabapentin 100mg PO TID  4. Morphine 2mg IVP Q4h prn breakthrough pain  Plan discussed with Dr. Max

## 2020-09-11 NOTE — PROGRESS NOTE ADULT - SUBJECTIVE AND OBJECTIVE BOX
INTERVAL HPI/OVERNIGHT EVENTS: Denies abdominal pain; stable R knee pain. No rash but itchy--relieved with Benadryl.    CONSTITUTIONAL:  Negative fever or chills, feels well, good appetite  EYES:  Negative  blurry vision or double vision  CARDIOVASCULAR:  Negative for chest pain or palpitations  RESPIRATORY:  Negative for cough, wheezing, or SOB   GASTROINTESTINAL:  Negative for nausea, vomiting, diarrhea, constipation, or abdominal pain  GENITOURINARY:  Negative frequency, urgency or dysuria  NEUROLOGIC:  No headache, confusion, dizziness, lightheadedness      ANTIBIOTICS/RELEVANT:    MEDICATIONS  (STANDING):  aspirin  chewable 81 milliGRAM(s) Oral two times a day  cefepime   IVPB      cefepime   IVPB 2000 milliGRAM(s) IV Intermittent every 8 hours  DAPTOmycin IVPB 675 milliGRAM(s) IV Intermittent every 24 hours  dextrose 5%. 1000 milliLiter(s) (50 mL/Hr) IV Continuous <Continuous>  dextrose 50% Injectable 12.5 Gram(s) IV Push once  dextrose 50% Injectable 25 Gram(s) IV Push once  dextrose 50% Injectable 25 Gram(s) IV Push once  digoxin     Tablet 0.125 milliGRAM(s) Oral daily  gabapentin 100 milliGRAM(s) Oral <User Schedule>  gabapentin 200 milliGRAM(s) Oral at bedtime  insulin glargine Injectable (LANTUS) 10 Unit(s) SubCutaneous at bedtime  insulin lispro (HumaLOG) corrective regimen sliding scale   SubCutaneous Before meals and at bedtime  insulin lispro Injectable (HumaLOG) 8 Unit(s) SubCutaneous three times a day before meals  lactated ringers. 1000 milliLiter(s) (50 mL/Hr) IV Continuous <Continuous>  levothyroxine 50 MICROGram(s) Oral daily  lidocaine 2% Injectable 10 milliLiter(s) Local Injection once  metoprolol succinate ER 25 milliGRAM(s) Oral daily  pantoprazole  Injectable 40 milliGRAM(s) IV Push daily  polyethylene glycol 3350 17 Gram(s) Oral daily  prasugrel 10 milliGRAM(s) Oral daily  rifAMPin 300 milliGRAM(s) Oral every 12 hours  rosuvastatin 10 milliGRAM(s) Oral at bedtime  sertraline 25 milliGRAM(s) Oral daily  spironolactone 25 milliGRAM(s) Oral daily  tamsulosin 0.4 milliGRAM(s) Oral at bedtime    MEDICATIONS  (PRN):  acetaminophen   Tablet .. 650 milliGRAM(s) Oral every 6 hours PRN Temp greater or equal to 38C (100.4F), Mild Pain (1 - 3)  aluminum hydroxide/magnesium hydroxide/simethicone Suspension 30 milliLiter(s) Oral four times a day PRN Indigestion  bisacodyl Suppository 10 milliGRAM(s) Rectal daily PRN If no bowel movement by POD#2  cyclobenzaprine 5 milliGRAM(s) Oral three times a day PRN Muscle Spasm  dextrose 40% Gel 15 Gram(s) Oral once PRN Blood Glucose LESS THAN 70 milliGRAM(s)/deciliter  diazepam    Tablet 5 milliGRAM(s) Oral every 8 hours PRN anxiety/agitaiton  diphenhydrAMINE   Injectable 25 milliGRAM(s) IV Push every 6 hours PRN Rash and/or Itching  glucagon  Injectable 1 milliGRAM(s) IntraMuscular once PRN Glucose LESS THAN 70 milligrams/deciliter  metoclopramide Injectable 10 milliGRAM(s) IV Push every 6 hours PRN breakthrough nausea  morphine  - Injectable 2 milliGRAM(s) IV Push every 4 hours PRN breakthrough pain  morphine  IR 15 milliGRAM(s) Oral every 4 hours PRN Moderate Pain (4 - 6)  morphine  IR 30 milliGRAM(s) Oral every 4 hours PRN Severe Pain (7 - 10)  ondansetron Injectable 4 milliGRAM(s) IV Push every 6 hours PRN Nausea and/or Vomiting  senna 2 Tablet(s) Oral at bedtime PRN Constipation        Vital Signs Last 24 Hrs  T(C): 36.7 (11 Sep 2020 09:05), Max: 36.8 (10 Sep 2020 21:24)  T(F): 98 (11 Sep 2020 09:05), Max: 98.3 (10 Sep 2020 21:24)  HR: 88 (11 Sep 2020 09:05) (79 - 90)  BP: 107/59 (11 Sep 2020 09:05) (100/56 - 121/58)  BP(mean): --  RR: 18 (11 Sep 2020 09:05) (18 - 19)  SpO2: 96% (11 Sep 2020 09:05) (96% - 100%)    PHYSICAL EXAM:  Constitutional:Well-developed, well nourished  Eyes:DAMARIS, EOMI  Ear/Nose/Throat: no oral lesion, no sinus tenderness on percussion	  Neck:no JVD, no lymphadenopathy, supple  Respiratory: CTA colton  Cardiovascular: S1S2 RRR, no murmurs  Gastrointestinal:soft, (+) BS, no HSM; RUQ wound dressed  Extremities:no e/e/c; shallow L shin ulcer; R knee slight warmth  Vascular: DP Pulse:	right normal; left normal      LABS:                        8.2    9.02  )-----------( 368      ( 11 Sep 2020 06:24 )             29.6     09-11    134<L>  |  105  |  27<H>  ----------------------------<  129<H>  4.8   |  20<L>  |  1.00    Ca    8.8      11 Sep 2020 06:24    TPro  6.6  /  Alb  2.9<L>  /  TBili  0.4  /  DBili  x   /  AST  18  /  ALT  15  /  AlkPhos  135<H>  09-11          MICROBIOLOGY: reviewed    RADIOLOGY & ADDITIONAL STUDIES: < from: NM Inflammatory Loc Wholebody Gallium, Single Day (09.10.20 @ 16:03) >  Findings: Planar imaging demonstrates prominentuptake within the right knee which is nonspecific and may be secondary to postsurgical changes.    SPECT-CT images of the abdomen demonstrates minimal uptake within a linear tract extending from the gallbladder fossa to the anterior subcutaneous softtissues in the right upper quadrant. There is more prominent uptake within a density slightly more inferior in the anterior subcutaneous soft tissues which is new since prior CT abdomen 9/30/2020. No additional sites of abnormal uptake. There is physiologic uptake within the liver, spleen, bowel and bone marrow.    Impression:  Minimal uptake within a linear tract extending from the gallbladder fossa to the anterior subcutaneous soft tissues in the right upper quadrant. More prominent uptake within a density slightly more inferior in the anterior subcutaneous soft tissues which is new since prior CT abdomen 9/30/2020. Given history of recent incision and drainage since prior CT abdomen, these findings may be postprocedural versus infection.    < end of copied text >

## 2020-09-11 NOTE — PROGRESS NOTE ADULT - ASSESSMENT
65M PMHx CAD s/p CABG, CHF, DM, HLD, HTN, acute cholecystitis s/p perc sudhir 2/28, recent R TKA 7/22 presents with R knee infection. General surgery consulted for purulent drainage near prior perc sudhir tube site.     POD7 s/p bedside I&D at perc sudhir site (9/4) with improvement in erythema, induration and purulent expression. WBCs wnl, afebrile, CRP downtrending. Last LFTs were down trending    Cx from RUQ drainage showed Pseudomonas     HIDA scan was not able to visualize the gall bladder, patient was not able to tolerate delayed imaging, possible false positive finding  Gallium study didn't confirm the presence of a tract between GB and SQ collection    Plan discussed with Dr. Latif and Chief resident     - change guaze and tape daily, monitor for increased induration or purulent expression   - monitor WBCs and fevers   - We advice lap sudhir on elective setting given other medical problems and procedures the patient is currently undergoing, unless the patient developed acute inflammation, fever, sepsis or cholangitis then that time he will need the surgery as an emergency  - Surgery will continue to Follow for dressing change of the I&D

## 2020-09-12 LAB
ANION GAP SERPL CALC-SCNC: 8 MMOL/L — SIGNIFICANT CHANGE UP (ref 5–17)
BUN SERPL-MCNC: 29 MG/DL — HIGH (ref 7–23)
CALCIUM SERPL-MCNC: 8.8 MG/DL — SIGNIFICANT CHANGE UP (ref 8.4–10.5)
CHLORIDE SERPL-SCNC: 104 MMOL/L — SIGNIFICANT CHANGE UP (ref 96–108)
CO2 SERPL-SCNC: 22 MMOL/L — SIGNIFICANT CHANGE UP (ref 22–31)
CREAT SERPL-MCNC: 0.9 MG/DL — SIGNIFICANT CHANGE UP (ref 0.5–1.3)
CRP SERPL-MCNC: 2.85 MG/DL — HIGH (ref 0–0.4)
CULTURE RESULTS: SIGNIFICANT CHANGE UP
ERYTHROCYTE [SEDIMENTATION RATE] IN BLOOD: 35 MM/HR — HIGH
GLUCOSE BLDC GLUCOMTR-MCNC: 112 MG/DL — HIGH (ref 70–99)
GLUCOSE BLDC GLUCOMTR-MCNC: 129 MG/DL — HIGH (ref 70–99)
GLUCOSE BLDC GLUCOMTR-MCNC: 182 MG/DL — HIGH (ref 70–99)
GLUCOSE BLDC GLUCOMTR-MCNC: 189 MG/DL — HIGH (ref 70–99)
GLUCOSE BLDC GLUCOMTR-MCNC: 207 MG/DL — HIGH (ref 70–99)
GLUCOSE SERPL-MCNC: 215 MG/DL — HIGH (ref 70–99)
HCT VFR BLD CALC: 28.5 % — LOW (ref 39–50)
HGB BLD-MCNC: 7.9 G/DL — LOW (ref 13–17)
MCHC RBC-ENTMCNC: 19.6 PG — LOW (ref 27–34)
MCHC RBC-ENTMCNC: 27.7 GM/DL — LOW (ref 32–36)
MCV RBC AUTO: 70.5 FL — LOW (ref 80–100)
NRBC # BLD: 0 /100 WBCS — SIGNIFICANT CHANGE UP (ref 0–0)
PLATELET # BLD AUTO: 371 K/UL — SIGNIFICANT CHANGE UP (ref 150–400)
POTASSIUM SERPL-MCNC: 5.1 MMOL/L — SIGNIFICANT CHANGE UP (ref 3.5–5.3)
POTASSIUM SERPL-SCNC: 5.1 MMOL/L — SIGNIFICANT CHANGE UP (ref 3.5–5.3)
RBC # BLD: 4.04 M/UL — LOW (ref 4.2–5.8)
RBC # FLD: 19.2 % — HIGH (ref 10.3–14.5)
SODIUM SERPL-SCNC: 134 MMOL/L — LOW (ref 135–145)
SPECIMEN SOURCE: SIGNIFICANT CHANGE UP
WBC # BLD: 12.57 K/UL — HIGH (ref 3.8–10.5)
WBC # FLD AUTO: 12.57 K/UL — HIGH (ref 3.8–10.5)

## 2020-09-12 PROCEDURE — 99232 SBSQ HOSP IP/OBS MODERATE 35: CPT

## 2020-09-12 RX ADMIN — Medication 81 MILLIGRAM(S): at 18:07

## 2020-09-12 RX ADMIN — Medication 8 UNIT(S): at 09:15

## 2020-09-12 RX ADMIN — MORPHINE SULFATE 30 MILLIGRAM(S): 50 CAPSULE, EXTENDED RELEASE ORAL at 10:45

## 2020-09-12 RX ADMIN — MORPHINE SULFATE 30 MILLIGRAM(S): 50 CAPSULE, EXTENDED RELEASE ORAL at 16:54

## 2020-09-12 RX ADMIN — Medication 25 MILLIGRAM(S): at 06:55

## 2020-09-12 RX ADMIN — CEFEPIME 100 MILLIGRAM(S): 1 INJECTION, POWDER, FOR SOLUTION INTRAMUSCULAR; INTRAVENOUS at 16:35

## 2020-09-12 RX ADMIN — Medication 2: at 13:06

## 2020-09-12 RX ADMIN — GABAPENTIN 100 MILLIGRAM(S): 400 CAPSULE ORAL at 13:04

## 2020-09-12 RX ADMIN — Medication 8 UNIT(S): at 13:05

## 2020-09-12 RX ADMIN — CEFEPIME 100 MILLIGRAM(S): 1 INJECTION, POWDER, FOR SOLUTION INTRAMUSCULAR; INTRAVENOUS at 07:00

## 2020-09-12 RX ADMIN — DAPTOMYCIN 127 MILLIGRAM(S): 500 INJECTION, POWDER, LYOPHILIZED, FOR SOLUTION INTRAVENOUS at 01:05

## 2020-09-12 RX ADMIN — Medication 4: at 09:15

## 2020-09-12 RX ADMIN — SPIRONOLACTONE 25 MILLIGRAM(S): 25 TABLET, FILM COATED ORAL at 06:55

## 2020-09-12 RX ADMIN — Medication 0.12 MILLIGRAM(S): at 06:55

## 2020-09-12 RX ADMIN — PRASUGREL 10 MILLIGRAM(S): 5 TABLET, FILM COATED ORAL at 13:06

## 2020-09-12 RX ADMIN — MORPHINE SULFATE 2 MILLIGRAM(S): 50 CAPSULE, EXTENDED RELEASE ORAL at 04:05

## 2020-09-12 RX ADMIN — GABAPENTIN 100 MILLIGRAM(S): 400 CAPSULE ORAL at 06:00

## 2020-09-12 RX ADMIN — MORPHINE SULFATE 30 MILLIGRAM(S): 50 CAPSULE, EXTENDED RELEASE ORAL at 17:30

## 2020-09-12 RX ADMIN — INSULIN GLARGINE 10 UNIT(S): 100 INJECTION, SOLUTION SUBCUTANEOUS at 22:40

## 2020-09-12 RX ADMIN — MORPHINE SULFATE 30 MILLIGRAM(S): 50 CAPSULE, EXTENDED RELEASE ORAL at 09:49

## 2020-09-12 RX ADMIN — PANTOPRAZOLE SODIUM 40 MILLIGRAM(S): 20 TABLET, DELAYED RELEASE ORAL at 13:07

## 2020-09-12 RX ADMIN — Medication 81 MILLIGRAM(S): at 06:55

## 2020-09-12 RX ADMIN — ROSUVASTATIN CALCIUM 10 MILLIGRAM(S): 5 TABLET ORAL at 22:40

## 2020-09-12 RX ADMIN — Medication 8 UNIT(S): at 18:08

## 2020-09-12 RX ADMIN — GABAPENTIN 200 MILLIGRAM(S): 400 CAPSULE ORAL at 22:40

## 2020-09-12 RX ADMIN — Medication 50 MICROGRAM(S): at 06:55

## 2020-09-12 RX ADMIN — SERTRALINE 25 MILLIGRAM(S): 25 TABLET, FILM COATED ORAL at 13:06

## 2020-09-12 NOTE — PROGRESS NOTE ADULT - ASSESSMENT
66 yo M w/ PMH of CAD s/p CABG, CHF, DM, HLD, HTN, acute cholecystitis s/p perc sudhir 2/28, recent R TKA 7/22 presents with R knee infection. General surgery consulted for purulent drainage near prior perc sudhir tube site.     POD 8 s/p bedside I&D at perc sudhir site (9/4) with improvement in erythema, induration and purulent expression. WBCs wnl, afebrile, CRP downtrending. Last LFTs were down trending    Cx from RUQ drainage showed Pseudomonas.     HIDA scan was not able to visualize the gall bladder, patient was not able to tolerate delayed imaging, possible false positive finding  Gallium study didn't confirm the presence of a tract between GB and SQ collection    Plan discussed with Dr. Latif and Chief resident     - Change guaze and tape daily, monitor for increased induration or purulent expression   - Monitor WBCs and fevers  - We advice laparoscopic elective cholecystectomy given medical problems and procedures the patient is currently undergoing, unless the patient developed acute inflammation, fever, sepsis or cholangitis then that time he will need the surgery as an emergency  - Surgery will continue to Follow for dressing change of the I&D

## 2020-09-12 NOTE — PROGRESS NOTE ADULT - ASSESSMENT
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  He has completed 6 weeks of vancomycin & rifampin.  He is s/p R hip exchange of hardware on 9/4 - OR cultures NGTD.  I&D of abd wall done 9/4 - cultures growing Pseudomonas and Enterococcus faecium. Gallium SPECT done 9/10 showed minimal uptake from GB fossa to soft tissue and was interpreted by surgery as a negative study.     Recommend:  1.  Continue Daptomycin 675 mg IV daily.  Check CPK in AM on 9/14  2.  Continue Cefepime 2 grams IV q8hrs  3.  Continue rifampin 300 mg PO q12   4.  Monitor CBC with differential     ID team 1 will follow this weekend

## 2020-09-12 NOTE — PROGRESS NOTE ADULT - ASSESSMENT
65yMmerrick s/p R knee antibiotic spacer 07/22 who presented with R knee swelling and RUQ wound drainage on 09/01/2020 and R hip exchange of hardware on 09/04/2020

## 2020-09-12 NOTE — PROGRESS NOTE ADULT - SUBJECTIVE AND OBJECTIVE BOX
Interval Events: Reviewed  Patient seen and examined at bedside.    Patient is a 65y old  Male who presents with a chief complaint of R Knee Swelling (12 Sep 2020 11:22)  No acute event overnight. Pain controlled      PAST MEDICAL & SURGICAL HISTORY:  Diabetic neuropathy    STEMI (ST elevation myocardial infarction)    Diverticulitis    MRSA bacteremia    History of celiac disease    CHF (congestive heart failure)  EF ~ 25%    HTN (hypertension)    Diabetes  on insulin pump    Blood clot due to device, implant, or graft  was on blood thinners    HLD (hyperlipidemia)    Osteoarthritis    Atherosclerosis of coronary artery  CAD (coronary artery disease)    Status post percutaneous transluminal coronary angioplasty  in 2012    History of open reduction and internal fixation (ORIF) procedure    Surgery, elective  Right shoulder    Surgery, elective  right knee wound debridement    S/P TKR (total knee replacement), right  with infection Mrsa   per pt he was cleared from MRSA infection    S/P CABG x 1  2018    Stented coronary artery  10/18 heart attack  INFERIOR WALL MI    Other postprocedural status  Fixation hardware in foot LEFT        MEDICATIONS:  Pulmonary:  diphenhydrAMINE   Injectable 25 milliGRAM(s) IV Push every 6 hours PRN    Antimicrobials:  cefepime   IVPB      cefepime   IVPB 2000 milliGRAM(s) IV Intermittent every 8 hours  DAPTOmycin IVPB 675 milliGRAM(s) IV Intermittent every 24 hours  rifAMPin 300 milliGRAM(s) Oral every 12 hours    Anticoagulants:  aspirin  chewable 81 milliGRAM(s) Oral two times a day  prasugrel 10 milliGRAM(s) Oral daily    Cardiac:  digoxin     Tablet 0.125 milliGRAM(s) Oral daily  metoprolol succinate ER 25 milliGRAM(s) Oral daily  spironolactone 25 milliGRAM(s) Oral daily  tamsulosin 0.4 milliGRAM(s) Oral at bedtime      Allergies    ACE inhibitors (Hives)  carvedilol (Other)  enalapril (Hives)  Entresto (Other)    Intolerances        Vital Signs Last 24 Hrs  T(C): 36.9 (12 Sep 2020 09:10), Max: 36.9 (12 Sep 2020 09:10)  T(F): 98.4 (12 Sep 2020 09:10), Max: 98.4 (12 Sep 2020 09:10)  HR: 89 (12 Sep 2020 09:10) (76 - 89)  BP: 106/63 (12 Sep 2020 09:10) (106/63 - 116/61)  BP(mean): --  RR: 18 (12 Sep 2020 09:10) (18 - 18)  SpO2: 98% (12 Sep 2020 09:10) (98% - 100%)    09-11 @ 07:01  -  09-12 @ 07:00  --------------------------------------------------------  IN: 2150 mL / OUT: 1100 mL / NET: 1050 mL          Review of Systems:   •	General: negative  •	Skin/Breast: negative  •	Ophthalmologic: negative  •	ENMT: negative  •	Respiratory and Thorax: negative  •	Cardiovascular: negative  •	Gastrointestinal: negative  •	Genitourinary: negative  •	Musculoskeletal: negative  •	Neurological: negative  •	Psychiatric: negative  •	Hematology/Lymphatics: negative  •	Endocrine: negative  •	Allergic/Immunologic: negative    Physical Exam:   • Constitutional:	Well-developed, well nourished  • Eyes:	EOMI; PERRL; no drainage or redness  • ENMT:	No oral lesions; no gross abnormalities  • Neck	no thyromegaly or nodules  • Breasts:	not examined  • Back:	No deformity or limitation of movement  • Respiratory:	Breath Sounds equal & clear to auscultation, no accessory muscle use  • Cardiovascular:	Regular rate & rhythm, normal S1, S2; no murmurs, gallops or rubs; no S3, S4  • Gastrointestinal:	Soft, non-tender, no hepatosplenomegaly, normal bowel sounds  • Genitourinary:	not examined  • Rectal: not examined  • Extremities:	No cyanosis, clubbing or edema, left leg dressing intact.  • Vascular:	Equal and normal pulses (dorsalis pedis)  • Neurologica:l	not examined  • Skin:	No lesions; no rash  • Lymph Nodes:	No lymphadedenopathy  • Musculoskeletal:	No joint pain, swelling or deformity; no limitation of movement        LABS:      CBC Full  -  ( 12 Sep 2020 08:06 )  WBC Count : 12.57 K/uL  RBC Count : 4.04 M/uL  Hemoglobin : 7.9 g/dL  Hematocrit : 28.5 %  Platelet Count - Automated : 371 K/uL  Mean Cell Volume : 70.5 fl  Mean Cell Hemoglobin : 19.6 pg  Mean Cell Hemoglobin Concentration : 27.7 gm/dL  Auto Neutrophil # : x  Auto Lymphocyte # : x  Auto Monocyte # : x  Auto Eosinophil # : x  Auto Basophil # : x  Auto Neutrophil % : x  Auto Lymphocyte % : x  Auto Monocyte % : x  Auto Eosinophil % : x  Auto Basophil % : x    09-12    134<L>  |  104  |  29<H>  ----------------------------<  215<H>  5.1   |  22  |  0.90    Ca    8.8      12 Sep 2020 08:06    TPro  6.6  /  Alb  2.9<L>  /  TBili  0.4  /  DBili  x   /  AST  18  /  ALT  15  /  AlkPhos  135<H>  09-11                        RADIOLOGY & ADDITIONAL STUDIES (The following images were personally reviewed):  Loja:                                     No  Urine output:                       adequate  DVT prophylaxis:                 Yes  Flattus:                                  Yes  Bowel movement:              No

## 2020-09-12 NOTE — PROGRESS NOTE ADULT - SUBJECTIVE AND OBJECTIVE BOX
INTERVAL HPI/OVERNIGHT EVENTS:    Coverage for Dr. Kay (Team 2)    Patient was seen and examined at bedside.  Has some drainage from LLE wound.  No rash or myalgias     CONSTITUTIONAL:  Negative fever or chills, feels well, good appetite  EYES:  Negative  blurry vision or double vision  CARDIOVASCULAR:  Negative for chest pain or palpitations  RESPIRATORY:  Negative for cough, wheezing, or SOB   GASTROINTESTINAL:  Negative for nausea, vomiting, diarrhea, constipation, or abdominal pain  GENITOURINARY:  Negative frequency, urgency or dysuria  NEUROLOGIC:  No headache, confusion, dizziness, lightheadedness      ANTIBIOTICS/RELEVANT:    MEDICATIONS  (STANDING):  aspirin  chewable 81 milliGRAM(s) Oral two times a day  cefepime   IVPB      cefepime   IVPB 2000 milliGRAM(s) IV Intermittent every 8 hours  DAPTOmycin IVPB 675 milliGRAM(s) IV Intermittent every 24 hours  dextrose 5%. 1000 milliLiter(s) (50 mL/Hr) IV Continuous <Continuous>  dextrose 50% Injectable 12.5 Gram(s) IV Push once  dextrose 50% Injectable 25 Gram(s) IV Push once  dextrose 50% Injectable 25 Gram(s) IV Push once  digoxin     Tablet 0.125 milliGRAM(s) Oral daily  gabapentin 100 milliGRAM(s) Oral <User Schedule>  gabapentin 200 milliGRAM(s) Oral at bedtime  insulin glargine Injectable (LANTUS) 10 Unit(s) SubCutaneous at bedtime  insulin lispro (HumaLOG) corrective regimen sliding scale   SubCutaneous Before meals and at bedtime  insulin lispro Injectable (HumaLOG) 8 Unit(s) SubCutaneous three times a day before meals  lactated ringers. 1000 milliLiter(s) (50 mL/Hr) IV Continuous <Continuous>  levothyroxine 50 MICROGram(s) Oral daily  lidocaine 2% Injectable 10 milliLiter(s) Local Injection once  metoprolol succinate ER 25 milliGRAM(s) Oral daily  pantoprazole  Injectable 40 milliGRAM(s) IV Push daily  polyethylene glycol 3350 17 Gram(s) Oral daily  prasugrel 10 milliGRAM(s) Oral daily  rifAMPin 300 milliGRAM(s) Oral every 12 hours  rosuvastatin 10 milliGRAM(s) Oral at bedtime  sertraline 25 milliGRAM(s) Oral daily  spironolactone 25 milliGRAM(s) Oral daily  tamsulosin 0.4 milliGRAM(s) Oral at bedtime    MEDICATIONS  (PRN):  acetaminophen   Tablet .. 650 milliGRAM(s) Oral every 6 hours PRN Temp greater or equal to 38C (100.4F), Mild Pain (1 - 3)  aluminum hydroxide/magnesium hydroxide/simethicone Suspension 30 milliLiter(s) Oral four times a day PRN Indigestion  bisacodyl Suppository 10 milliGRAM(s) Rectal daily PRN If no bowel movement by POD#2  cyclobenzaprine 5 milliGRAM(s) Oral three times a day PRN Muscle Spasm  dextrose 40% Gel 15 Gram(s) Oral once PRN Blood Glucose LESS THAN 70 milliGRAM(s)/deciliter  diazepam    Tablet 5 milliGRAM(s) Oral every 8 hours PRN anxiety/agitaiton  diphenhydrAMINE   Injectable 25 milliGRAM(s) IV Push every 6 hours PRN Rash and/or Itching  glucagon  Injectable 1 milliGRAM(s) IntraMuscular once PRN Glucose LESS THAN 70 milligrams/deciliter  metoclopramide Injectable 10 milliGRAM(s) IV Push every 6 hours PRN breakthrough nausea  morphine  - Injectable 2 milliGRAM(s) IV Push every 4 hours PRN breakthrough pain  morphine  IR 15 milliGRAM(s) Oral every 4 hours PRN Moderate Pain (4 - 6)  morphine  IR 30 milliGRAM(s) Oral every 4 hours PRN Severe Pain (7 - 10)  ondansetron Injectable 4 milliGRAM(s) IV Push every 6 hours PRN Nausea and/or Vomiting  senna 2 Tablet(s) Oral at bedtime PRN Constipation        Vital Signs Last 24 Hrs  T(C): 36.9 (12 Sep 2020 09:10), Max: 36.9 (12 Sep 2020 09:10)  T(F): 98.4 (12 Sep 2020 09:10), Max: 98.4 (12 Sep 2020 09:10)  HR: 89 (12 Sep 2020 09:10) (76 - 89)  BP: 106/63 (12 Sep 2020 09:10) (106/63 - 116/61)  BP(mean): --  RR: 18 (12 Sep 2020 09:10) (18 - 18)  SpO2: 98% (12 Sep 2020 09:10) (98% - 100%)    PHYSICAL EXAM:  Constitutional:  non-toxic, no distress  Eyes:  no icterus   Ear/Nose/Throat: no oral lesion  Neck:  supple  Respiratory: CTA colton  Cardiovascular: S1S2 RRR, no murmurs  Gastrointestinal:soft, (+) BS, no HSM  Extremities:  LLE with drainage on dressing   Vascular: DP Pulse:	right normal; left normal      LABS:                        7.9    12.57 )-----------( 371      ( 12 Sep 2020 08:06 )             28.5     09-12    134<L>  |  104  |  29<H>  ----------------------------<  215<H>  5.1   |  22  |  0.90    Ca    8.8      12 Sep 2020 08:06    TPro  6.6  /  Alb  2.9<L>  /  TBili  0.4  /  DBili  x   /  AST  18  /  ALT  15  /  AlkPhos  135<H>  09-11          MICROBIOLOGY:    Culture - Fungal, Tissue (09.04.20 @ 22:31)    Specimen Source: .Tissue (5)Right Hip Tissue    Culture Results:   Testing in progress    Culture - Surgical Swab (09.04.20 @ 19:06)    -  Piperacillin/Tazobactam: S <=8    -  Tobramycin: S <=2    Gram Stain:   No organisms seen  Moderate WBC's    -  Aztreonam: S <=4    -  Cefepime: S <=2    -  Ciprofloxacin: S <=0.25    -  Gentamicin: S 4    Specimen Source: .Surgical Swab abdominal wall abscess    Culture Results:   Rare Pseudomonas aeruginosa  Rare Enterococcus faecium (vancomycin resistant)  Floor previously notified.  See previous culture susceptibility    Organism Identification: Pseudomonas aeruginosa    Organism: Pseudomonas aeruginosa    Method Type: YOLIE        RADIOLOGY & ADDITIONAL STUDIES:

## 2020-09-12 NOTE — PROGRESS NOTE ADULT - SUBJECTIVE AND OBJECTIVE BOX
SUBJECTIVE: Overnight there were no significant events, this morning the patient is feeling well. He was examined at the bedside. The patient denies having chest pain, SOB, nausea, vomiting, dizziness, chills.      Vital Signs Last 24 Hrs  T(C): 36.7 (12 Sep 2020 15:22), Max: 36.9 (12 Sep 2020 09:10)  T(F): 98 (12 Sep 2020 15:22), Max: 98.4 (12 Sep 2020 09:10)  HR: 77 (12 Sep 2020 15:22) (77 - 89)  BP: 100/58 (12 Sep 2020 15:22) (100/58 - 106/63)  BP(mean): --  RR: 18 (12 Sep 2020 15:22) (17 - 18)  SpO2: 97% (12 Sep 2020 15:22) (97% - 98%)    Physical Exam:  General: NAD, resting comfortably on the bed  Pulmonary: Nonlabored breathing, no respiratory distress  Abdominal: soft, NT/ND, small open wound about 1 cm in RUQ w/ yellow purulent discharge, no rebound tenderness, guarding, rigidity  Extremities: WWP, normal strength, no clubbing/cyanosis/edema  Neuro: AAOx3    Lines/drains/tubes:    I&O's Summary    11 Sep 2020 07:01  -  12 Sep 2020 07:00  --------------------------------------------------------  IN: 2250 mL / OUT: 1100 mL / NET: 1150 mL    12 Sep 2020 07:01  -  12 Sep 2020 16:22  --------------------------------------------------------  IN: 480 mL / OUT: 1050 mL / NET: -570 mL        LABS:                        7.9    12.57 )-----------( 371      ( 12 Sep 2020 08:06 )             28.5     09-12    134<L>  |  104  |  29<H>  ----------------------------<  215<H>  5.1   |  22  |  0.90    Ca    8.8      12 Sep 2020 08:06    TPro  6.6  /  Alb  2.9<L>  /  TBili  0.4  /  DBili  x   /  AST  18  /  ALT  15  /  AlkPhos  135<H>  09-11        CAPILLARY BLOOD GLUCOSE      POCT Blood Glucose.: 189 mg/dL (12 Sep 2020 13:01)  POCT Blood Glucose.: 182 mg/dL (12 Sep 2020 11:49)  POCT Blood Glucose.: 207 mg/dL (12 Sep 2020 08:09)  POCT Blood Glucose.: 131 mg/dL (11 Sep 2020 21:30)  POCT Blood Glucose.: 115 mg/dL (11 Sep 2020 17:01)    LIVER FUNCTIONS - ( 11 Sep 2020 06:24 )  Alb: 2.9 g/dL / Pro: 6.6 g/dL / ALK PHOS: 135 U/L / ALT: 15 U/L / AST: 18 U/L / GGT: x             RADIOLOGY & ADDITIONAL STUDIES:

## 2020-09-13 LAB
ANION GAP SERPL CALC-SCNC: 9 MMOL/L — SIGNIFICANT CHANGE UP (ref 5–17)
BLD GP AB SCN SERPL QL: NEGATIVE — SIGNIFICANT CHANGE UP
BUN SERPL-MCNC: 27 MG/DL — HIGH (ref 7–23)
CALCIUM SERPL-MCNC: 8.5 MG/DL — SIGNIFICANT CHANGE UP (ref 8.4–10.5)
CHLORIDE SERPL-SCNC: 104 MMOL/L — SIGNIFICANT CHANGE UP (ref 96–108)
CO2 SERPL-SCNC: 21 MMOL/L — LOW (ref 22–31)
CREAT SERPL-MCNC: 0.95 MG/DL — SIGNIFICANT CHANGE UP (ref 0.5–1.3)
GLUCOSE BLDC GLUCOMTR-MCNC: 112 MG/DL — HIGH (ref 70–99)
GLUCOSE BLDC GLUCOMTR-MCNC: 119 MG/DL — HIGH (ref 70–99)
GLUCOSE BLDC GLUCOMTR-MCNC: 129 MG/DL — HIGH (ref 70–99)
GLUCOSE BLDC GLUCOMTR-MCNC: 135 MG/DL — HIGH (ref 70–99)
GLUCOSE SERPL-MCNC: 121 MG/DL — HIGH (ref 70–99)
HCT VFR BLD CALC: 27.3 % — LOW (ref 39–50)
HGB BLD-MCNC: 7.5 G/DL — LOW (ref 13–17)
MCHC RBC-ENTMCNC: 19.1 PG — LOW (ref 27–34)
MCHC RBC-ENTMCNC: 27.5 GM/DL — LOW (ref 32–36)
MCV RBC AUTO: 69.6 FL — LOW (ref 80–100)
NRBC # BLD: 0 /100 WBCS — SIGNIFICANT CHANGE UP (ref 0–0)
PLATELET # BLD AUTO: 360 K/UL — SIGNIFICANT CHANGE UP (ref 150–400)
POTASSIUM SERPL-MCNC: 4.4 MMOL/L — SIGNIFICANT CHANGE UP (ref 3.5–5.3)
POTASSIUM SERPL-SCNC: 4.4 MMOL/L — SIGNIFICANT CHANGE UP (ref 3.5–5.3)
RBC # BLD: 3.92 M/UL — LOW (ref 4.2–5.8)
RBC # FLD: 19.5 % — HIGH (ref 10.3–14.5)
RH IG SCN BLD-IMP: POSITIVE — SIGNIFICANT CHANGE UP
SODIUM SERPL-SCNC: 134 MMOL/L — LOW (ref 135–145)
WBC # BLD: 9.47 K/UL — SIGNIFICANT CHANGE UP (ref 3.8–10.5)
WBC # FLD AUTO: 9.47 K/UL — SIGNIFICANT CHANGE UP (ref 3.8–10.5)

## 2020-09-13 PROCEDURE — 99232 SBSQ HOSP IP/OBS MODERATE 35: CPT

## 2020-09-13 RX ADMIN — CEFEPIME 100 MILLIGRAM(S): 1 INJECTION, POWDER, FOR SOLUTION INTRAMUSCULAR; INTRAVENOUS at 23:58

## 2020-09-13 RX ADMIN — Medication 8 UNIT(S): at 18:14

## 2020-09-13 RX ADMIN — MORPHINE SULFATE 30 MILLIGRAM(S): 50 CAPSULE, EXTENDED RELEASE ORAL at 13:51

## 2020-09-13 RX ADMIN — PRASUGREL 10 MILLIGRAM(S): 5 TABLET, FILM COATED ORAL at 13:51

## 2020-09-13 RX ADMIN — GABAPENTIN 100 MILLIGRAM(S): 400 CAPSULE ORAL at 13:51

## 2020-09-13 RX ADMIN — Medication 8 UNIT(S): at 13:50

## 2020-09-13 RX ADMIN — CEFEPIME 100 MILLIGRAM(S): 1 INJECTION, POWDER, FOR SOLUTION INTRAMUSCULAR; INTRAVENOUS at 07:38

## 2020-09-13 RX ADMIN — Medication 25 MILLIGRAM(S): at 08:02

## 2020-09-13 RX ADMIN — Medication 81 MILLIGRAM(S): at 06:18

## 2020-09-13 RX ADMIN — ROSUVASTATIN CALCIUM 10 MILLIGRAM(S): 5 TABLET ORAL at 21:25

## 2020-09-13 RX ADMIN — DAPTOMYCIN 127 MILLIGRAM(S): 500 INJECTION, POWDER, LYOPHILIZED, FOR SOLUTION INTRAVENOUS at 01:15

## 2020-09-13 RX ADMIN — SERTRALINE 25 MILLIGRAM(S): 25 TABLET, FILM COATED ORAL at 13:51

## 2020-09-13 RX ADMIN — Medication 0.12 MILLIGRAM(S): at 06:19

## 2020-09-13 RX ADMIN — GABAPENTIN 100 MILLIGRAM(S): 400 CAPSULE ORAL at 06:18

## 2020-09-13 RX ADMIN — PANTOPRAZOLE SODIUM 40 MILLIGRAM(S): 20 TABLET, DELAYED RELEASE ORAL at 13:51

## 2020-09-13 RX ADMIN — Medication 8 UNIT(S): at 09:15

## 2020-09-13 RX ADMIN — CEFEPIME 100 MILLIGRAM(S): 1 INJECTION, POWDER, FOR SOLUTION INTRAMUSCULAR; INTRAVENOUS at 15:23

## 2020-09-13 RX ADMIN — Medication 81 MILLIGRAM(S): at 18:14

## 2020-09-13 RX ADMIN — MORPHINE SULFATE 30 MILLIGRAM(S): 50 CAPSULE, EXTENDED RELEASE ORAL at 14:45

## 2020-09-13 RX ADMIN — ONDANSETRON 4 MILLIGRAM(S): 8 TABLET, FILM COATED ORAL at 21:27

## 2020-09-13 RX ADMIN — SPIRONOLACTONE 25 MILLIGRAM(S): 25 TABLET, FILM COATED ORAL at 06:18

## 2020-09-13 RX ADMIN — INSULIN GLARGINE 10 UNIT(S): 100 INJECTION, SOLUTION SUBCUTANEOUS at 21:51

## 2020-09-13 RX ADMIN — GABAPENTIN 200 MILLIGRAM(S): 400 CAPSULE ORAL at 21:25

## 2020-09-13 RX ADMIN — Medication 25 MILLIGRAM(S): at 15:21

## 2020-09-13 RX ADMIN — MORPHINE SULFATE 30 MILLIGRAM(S): 50 CAPSULE, EXTENDED RELEASE ORAL at 07:38

## 2020-09-13 RX ADMIN — MORPHINE SULFATE 30 MILLIGRAM(S): 50 CAPSULE, EXTENDED RELEASE ORAL at 08:02

## 2020-09-13 RX ADMIN — CEFEPIME 100 MILLIGRAM(S): 1 INJECTION, POWDER, FOR SOLUTION INTRAMUSCULAR; INTRAVENOUS at 00:03

## 2020-09-13 RX ADMIN — Medication 50 MICROGRAM(S): at 06:19

## 2020-09-13 RX ADMIN — Medication 25 MILLIGRAM(S): at 21:25

## 2020-09-13 RX ADMIN — Medication 25 MILLIGRAM(S): at 06:19

## 2020-09-13 NOTE — PROGRESS NOTE ADULT - SUBJECTIVE AND OBJECTIVE BOX
Ortho Note    Pt comfortable without complaints, pain controlled  Denies CP, SOB, N/V, numbness/tingling     Vital Signs Last 24 Hrs  T(C): 36.7 (09-13-20 @ 06:17), Max: 36.7 (09-13-20 @ 06:17)  T(F): 98 (09-13-20 @ 06:17), Max: 98 (09-13-20 @ 06:17)  HR: 82 (09-13-20 @ 06:17) (82 - 82)  BP: 108/70 (09-13-20 @ 06:17) (108/70 - 108/70)  BP(mean): --  RR: 18 (09-13-20 @ 06:17) (18 - 18)  SpO2: 97% (09-13-20 @ 06:17) (97% - 97%)      General: Pt Alert and oriented, NAD  DSG C/D/I on RLE knee and over leg of LLE  Pulses: cap refill < 2 secs on RLE  Sensation: dimished sensation in non-dermatomal pattern over RLE  Motor: firing  EHL/FHL/TA/GS                          7.5    9.47  )-----------( 360      ( 13 Sep 2020 07:59 )             27.3   13 Sep 2020 07:59    134    |  104    |  27     ----------------------------<  121    4.4     |  21     |  0.95     Ca    8.5        13 Sep 2020 07:59          A/P: 65yMale s/p R knee antibiotic spacer 07/22 who presented with R knee swelling and RUQ wound drainage on 09/01/2020 and R hip exchange of hardware on 09/04/2020    1. Pain control as needed. Appreciate pain management recs.   2. DVT prophylaxis: ASA, Effient   3. PT, weight-bearing status: WBAT BLE   4. Appreciate Gen Surg recs for RUQ wound. Pt feels that wound is improving. HIDA scan inconclusive. Gallium scan does not show a tract. No surgery right now per Gen Surg.  5. Continue antibiotics. Appreciate ID recs.   6. Left leg wound managed by plastics and vascular. Wound care consulted. Appreciate vascular and plastics recs.  7. Continue sertraline and gabapentin per psych. Appreciate recs.  8. Dispo: OR next week for R knee spacer exchange and gastroc flap with plastics    Ortho Pager [phone]

## 2020-09-13 NOTE — PROGRESS NOTE ADULT - ASSESSMENT
65yMale s/p R knee antibiotic spacer 07/22 who presented with R knee swelling and RUQ wound drainage on 09/01/2020 and R hip exchange of hardware on 09/04/2020.  Awaiting to surgery for right knee antibiotic spacer exchange and left leg surgery.

## 2020-09-13 NOTE — PROGRESS NOTE ADULT - PROBLEM SELECTOR PLAN 2
9 day s/p arthroscopic I and D, 7/17/2020.  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 right knee explant and antibiotic spacer.  Will follow with ID regarding the right hip cultures which is negative to date and does not look infected

## 2020-09-13 NOTE — PROGRESS NOTE ADULT - ASSESSMENT
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  He has completed 6 weeks of vancomycin & rifampin.  He is s/p R hip exchange of hardware on 9/4 - OR cultures NGTD.  I&D of abd wall done 9/4 - cultures growing Pseudomonas and Enterococcus faecium. Gallium SPECT done 9/10 showed minimal uptake from GB fossa to soft tissue and was interpreted by surgery as a negative study.     Recommend:  1.  Continue Daptomycin 675 mg IV daily.  Check CPK in AM on 9/14  2.  Continue Cefepime 2 grams IV q8hrs  3.  Continue rifampin 300 mg PO q12   4.  Monitor CBC with differential     ID team 1 will follow this weekend.  Dr. Kay will resume care on 9/14

## 2020-09-13 NOTE — PROGRESS NOTE ADULT - SUBJECTIVE AND OBJECTIVE BOX
Team 4C Consult Note:     SUBJECTIVE:  Pt seen and examined bedside. Pt is doing well, resting comfortably on bed. Pain controlled. Diet tolerated.  +F/+BM. No nausea or vomiting. No complaints at this time.    Vital Signs Last 24 Hrs  T(C): 36.6 (13 Sep 2020 16:21), Max: 36.7 (12 Sep 2020 22:40)  T(F): 97.9 (13 Sep 2020 16:21), Max: 98.1 (12 Sep 2020 22:40)  HR: 79 (13 Sep 2020 16:21) (74 - 88)  BP: 99/58 (13 Sep 2020 16:21) (99/53 - 110/51)  BP(mean): --  RR: 18 (13 Sep 2020 16:21) (17 - 18)  SpO2: 100% (13 Sep 2020 16:21) (95% - 100%)    Physical Exam:  General: NAD  Pulmonary: Nonlabored breathing, no respiratory distress  Abdominal: soft, NT/ND, small open wound about 1 cm in RUQ w/o any expressible discharge no rebound tenderness, guarding, rigidity  Extremities: WWP, SCDs in place    Lines/drains/tubes:    I&O's Summary    12 Sep 2020 07:01  -  13 Sep 2020 07:00  --------------------------------------------------------  IN: 990 mL / OUT: 1550 mL / NET: -560 mL    13 Sep 2020 07:01  -  13 Sep 2020 19:28  --------------------------------------------------------  IN: 1010 mL / OUT: 600 mL / NET: 410 mL        LABS:                        7.5    9.47  )-----------( 360      ( 13 Sep 2020 07:59 )             27.3     09-13    134<L>  |  104  |  27<H>  ----------------------------<  121<H>  4.4   |  21<L>  |  0.95    Ca    8.5      13 Sep 2020 07:59          CAPILLARY BLOOD GLUCOSE  POCT Blood Glucose.: 129 mg/dL (13 Sep 2020 17:12)  POCT Blood Glucose.: 135 mg/dL (13 Sep 2020 12:58)  POCT Blood Glucose.: 112 mg/dL (13 Sep 2020 07:29)  POCT Blood Glucose.: 112 mg/dL (12 Sep 2020 21:39)

## 2020-09-13 NOTE — PROGRESS NOTE ADULT - ASSESSMENT
66 yo M w/ PMH of CAD s/p CABG, CHF, DM, HLD, HTN, acute cholecystitis s/p perc sudhir 2/28, recent R TKA 7/22 presents with R knee infection. General surgery consulted for purulent drainage near prior perc sudhir tube site.     POD 8 s/p bedside I&D at perc sudhir site (9/4) with improvement in erythema, induration and purulent expression. WBCs wnl, afebrile, CRP downtrending. Last LFTs were down trending    Cx from RUQ drainage showed Pseudomonas.     HIDA scan was not able to visualize the gall bladder, patient was not able to tolerate delayed imaging, possible false positive finding  Gallium study didn't confirm the presence of a tract between GB and SQ collection    - Change guaze and tape daily, monitor for increased induration or purulent expression   - Monitor WBCs and fevers  - We advice laparoscopic elective cholecystectomy given medical problems and procedures the patient is currently undergoing, unless the patient developed acute inflammation, fever, sepsis or cholangitis then that time he will need the surgery as an emergency  - Surgery will continue to Follow for dressing change of the I&D

## 2020-09-13 NOTE — PROGRESS NOTE ADULT - SUBJECTIVE AND OBJECTIVE BOX
INTERVAL HPI/OVERNIGHT EVENTS:    Patient was seen and examined at bedside.  No events.  Still with some drainage from right knee     CONSTITUTIONAL:  Negative fever or chills, feels well, good appetite  EYES:  Negative  blurry vision or double vision  CARDIOVASCULAR:  Negative for chest pain or palpitations  RESPIRATORY:  Negative for cough, wheezing, or SOB   GASTROINTESTINAL:  Negative for nausea, vomiting, diarrhea, constipation, or abdominal pain  GENITOURINARY:  Negative frequency, urgency or dysuria  NEUROLOGIC:  No headache, confusion, dizziness, lightheadedness      ANTIBIOTICS/RELEVANT:    MEDICATIONS  (STANDING):  aspirin  chewable 81 milliGRAM(s) Oral two times a day  cefepime   IVPB 2000 milliGRAM(s) IV Intermittent every 8 hours  cefepime   IVPB      DAPTOmycin IVPB 675 milliGRAM(s) IV Intermittent every 24 hours  dextrose 5%. 1000 milliLiter(s) (50 mL/Hr) IV Continuous <Continuous>  dextrose 50% Injectable 12.5 Gram(s) IV Push once  dextrose 50% Injectable 25 Gram(s) IV Push once  dextrose 50% Injectable 25 Gram(s) IV Push once  digoxin     Tablet 0.125 milliGRAM(s) Oral daily  gabapentin 100 milliGRAM(s) Oral <User Schedule>  gabapentin 200 milliGRAM(s) Oral at bedtime  insulin glargine Injectable (LANTUS) 10 Unit(s) SubCutaneous at bedtime  insulin lispro (HumaLOG) corrective regimen sliding scale   SubCutaneous Before meals and at bedtime  insulin lispro Injectable (HumaLOG) 8 Unit(s) SubCutaneous three times a day before meals  lactated ringers. 1000 milliLiter(s) (50 mL/Hr) IV Continuous <Continuous>  levothyroxine 50 MICROGram(s) Oral daily  lidocaine 2% Injectable 10 milliLiter(s) Local Injection once  metoprolol succinate ER 25 milliGRAM(s) Oral daily  pantoprazole  Injectable 40 milliGRAM(s) IV Push daily  polyethylene glycol 3350 17 Gram(s) Oral daily  prasugrel 10 milliGRAM(s) Oral daily  rifAMPin 300 milliGRAM(s) Oral every 12 hours  rosuvastatin 10 milliGRAM(s) Oral at bedtime  sertraline 25 milliGRAM(s) Oral daily  spironolactone 25 milliGRAM(s) Oral daily  tamsulosin 0.4 milliGRAM(s) Oral at bedtime    MEDICATIONS  (PRN):  acetaminophen   Tablet .. 650 milliGRAM(s) Oral every 6 hours PRN Temp greater or equal to 38C (100.4F), Mild Pain (1 - 3)  aluminum hydroxide/magnesium hydroxide/simethicone Suspension 30 milliLiter(s) Oral four times a day PRN Indigestion  bisacodyl Suppository 10 milliGRAM(s) Rectal daily PRN If no bowel movement by POD#2  cyclobenzaprine 5 milliGRAM(s) Oral three times a day PRN Muscle Spasm  dextrose 40% Gel 15 Gram(s) Oral once PRN Blood Glucose LESS THAN 70 milliGRAM(s)/deciliter  diazepam    Tablet 5 milliGRAM(s) Oral every 8 hours PRN anxiety/agitaiton  diphenhydrAMINE   Injectable 25 milliGRAM(s) IV Push every 6 hours PRN Rash and/or Itching  glucagon  Injectable 1 milliGRAM(s) IntraMuscular once PRN Glucose LESS THAN 70 milligrams/deciliter  metoclopramide Injectable 10 milliGRAM(s) IV Push every 6 hours PRN breakthrough nausea  morphine  - Injectable 2 milliGRAM(s) IV Push every 4 hours PRN breakthrough pain  morphine  IR 15 milliGRAM(s) Oral every 4 hours PRN Moderate Pain (4 - 6)  morphine  IR 30 milliGRAM(s) Oral every 4 hours PRN Severe Pain (7 - 10)  ondansetron Injectable 4 milliGRAM(s) IV Push every 6 hours PRN Nausea and/or Vomiting  senna 2 Tablet(s) Oral at bedtime PRN Constipation        Vital Signs Last 24 Hrs  T(C): 36.7 (13 Sep 2020 08:48), Max: 36.7 (12 Sep 2020 13:26)  T(F): 98 (13 Sep 2020 08:48), Max: 98.1 (12 Sep 2020 13:26)  HR: 88 (13 Sep 2020 08:48) (74 - 88)  BP: 110/51 (13 Sep 2020 08:48) (94/55 - 110/51)  BP(mean): --  RR: 17 (13 Sep 2020 08:48) (17 - 18)  SpO2: 95% (13 Sep 2020 08:48) (95% - 99%)    PHYSICAL EXAM:  Constitutional:  non-toxic, no distress  Eyes:  no icterus   Ear/Nose/Throat: no oral lesion  Neck:  supple  Respiratory: CTA colton  Cardiovascular: S1S2 RRR, no murmurs  Gastrointestinal:soft, (+) BS, no HSM  Extremities:  LLE with drainage on dressing   Vascular: DP Pulse:	right normal; left normal    LABS:                        7.5    9.47  )-----------( 360      ( 13 Sep 2020 07:59 )             27.3     09-13    134<L>  |  104  |  27<H>  ----------------------------<  121<H>  4.4   |  21<L>  |  0.95    Ca    8.5      13 Sep 2020 07:59            MICROBIOLOGY:    Culture - Surgical Swab (09.04.20 @ 19:06)    -  Piperacillin/Tazobactam: S <=8    -  Tobramycin: S <=2    Gram Stain:   No organisms seen  Moderate WBC's    -  Aztreonam: S <=4    -  Cefepime: S <=2    -  Ciprofloxacin: S <=0.25    -  Gentamicin: S 4    Specimen Source: .Surgical Swab abdominal wall abscess    Culture Results:   Rare Pseudomonas aeruginosa  Rare Enterococcus faecium (vancomycin resistant)  Floor previously notified.  See previous culture susceptibility    Organism Identification: Pseudomonas aeruginosa    Organism: Pseudomonas aeruginosa    Method Type: YOLIE        RADIOLOGY & ADDITIONAL STUDIES:    < from: NM SPECT/CT Inflammatory Loc, Single Area Single Day (09.10.20 @ 16:02) >  Impression:  Minimal uptake within a linear tract extending from the gallbladder fossa to the anterior subcutaneous soft tissues in the right upper quadrant. More prominent uptake within a density slightly more inferior in the anterior subcutaneous soft tissues which is new since prior CT abdomen 9/30/2020. Given history of recent incision and drainage since prior CT abdomen, these findings may be postprocedural versus infection.    < end of copied text >

## 2020-09-13 NOTE — CONSULT NOTE ADULT - SUBJECTIVE AND OBJECTIVE BOX
Juanito Blas is a 66y/o M with PMHx of CAD (s/p CABG 2018), CHF (EF 25%) Diabetes, HLD, HTN, R Revision Total Knee Arthroplasty with Antibiotic Spacer on 7/22 by Dr. Castro for Prosthetic Joint Infection. Pt underwent multiple revision knee surgeries complicated by prosthetic joint infection and bacteremia. Sent home with PICC line, getting vancomycin 1000 Q12 and Rifampin 300 Q12 daily. Denies any chest pain/SOB/fevers/chills. Pt also has chronic large skin defect on L anterior tibia. He has mild pain over this wound, denies any drainage, trauma. Plastic surgery service was consulted to evaluate the right and left lower extremity wounds.    Allergies:  	enalapril: Drug, Hives  	ACE inhibitors: Drug Category, Hives, Originally Entered as [Unknown] reaction to [ACE INHIBITORS]  	Entresto: Drug, Other, swelling of hands  	carvedilol: Drug, Other, tired    Home Medications:   · 	CeleBREX 200 mg oral capsule: 1 cap(s) orally 2 times a day   · 	acetaminophen 325 mg oral tablet: 3 tab(s) orally every 8 hours  · 	Silvadene 1% topical cream: 1 application topically 2 times a day to affected area Right knee   · 	rifAMPin 300 mg oral capsule: 1 cap(s) orally every 12 hours MDD:2 PLEASE TAKE FOR TOTAL OF 6 WEEKS FROM SURGERY on 7/22   · 	aspirin 81 mg oral delayed release tablet: 1 tab(s) orally 2 times a day -for DVT prophylaxis THEN can restart home regimen for heart    · 	oxyCODONE 10 mg oral tablet: 1 tab(s) orally every 4 to 6 hours, As Needed -Severe Pain (7 - 10) MDD:5  · 	OxyCONTIN 10 mg oral tablet, extended release: 1 tab(s) orally every 12 hours MDD:2  · 	metoprolol succinate 25 mg oral tablet, extended release: 1 tab(s) orally once a day  · 	tamsulosin 0.4 mg oral capsule: 1 cap(s) orally once a day (at bedtime)  · 	digoxin 125 mcg (0.125 mg) oral tablet: 1 tab(s) orally once a day  · 	prasugrel 10 mg oral tablet: 1 tab(s) orally once a day  · 	DULoxetine 30 mg oral delayed release capsule: 3 cap(s) orally once a day  · 	atorvastatin 40 mg oral tablet: 1 tab(s) orally once a day (at bedtime)  · 	pantoprazole 40 mg oral delayed release tablet: 1 tab(s) orally once a day, take 30 minutes before breakfast  · 	spironolactone 25 mg oral tablet: 1 tab(s) orally once a day    PAST MEDICAL HISTORY:  Atherosclerosis of coronary artery CAD (coronary artery disease)  Blood clot due to device, implant, or graft was on blood thinners  CHF (congestive heart failure) EF ~ 25%  Diabetes on insulin pump  Diabetic neuropathy   Diverticulitis   Celiac disease   HLD (hyperlipidemia)   HTN (hypertension)   MRSA bacteremia   Osteoarthritis   STEMI (ST elevation myocardial infarction).     PAST SURGICAL HISTORY:  ORIF procedure - foot LEFT  S/P CABG x 1 2018  S/P TKR (total knee replacement), right   Stented coronary artery 10/18 heart attack  INFERIOR WALL MI  Right knee wound debridement  Surgery, elective Right shoulder.     Social History : Pt denies any tobacco use, alcohol use, or illicit drug use    Physical Exam: General: AAOx3, NAD, Well-appearing, calm cooperative    	RLE: Old incision from revision TKA over anterior knee healing well. Central wound 2 x 2 cm area of granulation and opening.  	Minimal serous drainage noted on wound, no drainage expressible  	DP pulse 2+ distally    	LLE: Large 6x5 cm open wound with granulation. No graft in place.  	No expressible purulence or drainage noted. DP pulse +

## 2020-09-13 NOTE — PROGRESS NOTE ADULT - SUBJECTIVE AND OBJECTIVE BOX
Interval Events: Reviewed  Patient seen and examined at bedside.    Patient is a 65y old  Male who presents with a chief complaint of R Knee Swelling (12 Sep 2020 16:22)  no acute event overnight. Pain controlled.      PAST MEDICAL & SURGICAL HISTORY:  Diabetic neuropathy    STEMI (ST elevation myocardial infarction)    Diverticulitis    MRSA bacteremia    History of celiac disease    CHF (congestive heart failure)  EF ~ 25%    HTN (hypertension)    Diabetes  on insulin pump    Blood clot due to device, implant, or graft  was on blood thinners    HLD (hyperlipidemia)    Osteoarthritis    Atherosclerosis of coronary artery  CAD (coronary artery disease)    Status post percutaneous transluminal coronary angioplasty  in 2012    History of open reduction and internal fixation (ORIF) procedure    Surgery, elective  Right shoulder    Surgery, elective  right knee wound debridement    S/P TKR (total knee replacement), right  with infection Mrsa   per pt he was cleared from MRSA infection    S/P CABG x 1  2018    Stented coronary artery  10/18 heart attack  INFERIOR WALL MI    Other postprocedural status  Fixation hardware in foot LEFT        MEDICATIONS:  Pulmonary:  diphenhydrAMINE   Injectable 25 milliGRAM(s) IV Push every 6 hours PRN    Antimicrobials:  cefepime   IVPB 2000 milliGRAM(s) IV Intermittent every 8 hours  cefepime   IVPB      DAPTOmycin IVPB 675 milliGRAM(s) IV Intermittent every 24 hours  rifAMPin 300 milliGRAM(s) Oral every 12 hours    Anticoagulants:  aspirin  chewable 81 milliGRAM(s) Oral two times a day  prasugrel 10 milliGRAM(s) Oral daily    Cardiac:  digoxin     Tablet 0.125 milliGRAM(s) Oral daily  metoprolol succinate ER 25 milliGRAM(s) Oral daily  spironolactone 25 milliGRAM(s) Oral daily  tamsulosin 0.4 milliGRAM(s) Oral at bedtime      Allergies    ACE inhibitors (Hives)  carvedilol (Other)  enalapril (Hives)  Entresto (Other)    Intolerances        Vital Signs Last 24 Hrs  T(C): 36.7 (13 Sep 2020 06:17), Max: 36.9 (12 Sep 2020 09:10)  T(F): 98 (13 Sep 2020 06:17), Max: 98.4 (12 Sep 2020 09:10)  HR: 82 (13 Sep 2020 06:17) (74 - 89)  BP: 108/70 (13 Sep 2020 06:17) (94/55 - 108/70)  BP(mean): --  RR: 18 (13 Sep 2020 06:17) (17 - 18)  SpO2: 97% (13 Sep 2020 06:17) (96% - 99%)    09-12 @ 07:01  -  09-13 @ 07:00  --------------------------------------------------------  IN: 990 mL / OUT: 1550 mL / NET: -560 mL          Review of Systems:   •	General: negative  •	Skin/Breast: negative  •	Ophthalmologic: negative  •	ENMT: negative  •	Respiratory and Thorax: negative  •	Cardiovascular: negative  •	Gastrointestinal: negative  •	Genitourinary: negative  •	Musculoskeletal: negative  •	Neurological: negative  •	Psychiatric: negative  •	Hematology/Lymphatics: negative  •	Endocrine: negative  •	Allergic/Immunologic: negative    Physical Exam:   • Constitutional:	Well-developed, well nourished  • Eyes:	EOMI; PERRL; no drainage or redness  • ENMT:	No oral lesions; no gross abnormalities  • Neck	 no thyromegaly or nodules  • Breasts:	not examined  • Back:	No deformity or limitation of movement  • Respiratory:	Breath Sounds equal & clear to auscultation, no accessory muscle use  • Cardiovascular:	Regular rate & rhythm, normal S1, S2; no murmurs, gallops or rubs; no S3, S4  • Gastrointestinal:	Soft, non-tender, no hepatosplenomegaly, normal bowel sounds  • Genitourinary:	not examined  • Rectal: not examined  • Extremities:	No cyanosis, clubbing or edema  • Vascular:	Equal and normal pulses (dorsalis pedis)  • Neurologica:l	not examined  • Skin:	No lesions; no rash  • Lymph Nodes:	No lymphadedenopathy  • Musculoskeletal:	No joint pain, swelling or deformity; no limitation of movement        LABS:      CBC Full  -  ( 12 Sep 2020 08:06 )  WBC Count : 12.57 K/uL  RBC Count : 4.04 M/uL  Hemoglobin : 7.9 g/dL  Hematocrit : 28.5 %  Platelet Count - Automated : 371 K/uL  Mean Cell Volume : 70.5 fl  Mean Cell Hemoglobin : 19.6 pg  Mean Cell Hemoglobin Concentration : 27.7 gm/dL  Auto Neutrophil # : x  Auto Lymphocyte # : x  Auto Monocyte # : x  Auto Eosinophil # : x  Auto Basophil # : x  Auto Neutrophil % : x  Auto Lymphocyte % : x  Auto Monocyte % : x  Auto Eosinophil % : x  Auto Basophil % : x    09-12    134<L>  |  104  |  29<H>  ----------------------------<  215<H>  5.1   |  22  |  0.90    Ca    8.8      12 Sep 2020 08:06                          RADIOLOGY & ADDITIONAL STUDIES (The following images were personally reviewed):  Loja:                                     No  Urine output:                       adequate  DVT prophylaxis:                 Yes  Flattus:                                  Yes  Bowel movement:              No

## 2020-09-14 LAB
ANION GAP SERPL CALC-SCNC: 12 MMOL/L — SIGNIFICANT CHANGE UP (ref 5–17)
BLD GP AB SCN SERPL QL: NEGATIVE — SIGNIFICANT CHANGE UP
BUN SERPL-MCNC: 31 MG/DL — HIGH (ref 7–23)
CALCIUM SERPL-MCNC: 8.4 MG/DL — SIGNIFICANT CHANGE UP (ref 8.4–10.5)
CHLORIDE SERPL-SCNC: 105 MMOL/L — SIGNIFICANT CHANGE UP (ref 96–108)
CK SERPL-CCNC: 60 U/L — SIGNIFICANT CHANGE UP (ref 30–200)
CO2 SERPL-SCNC: 18 MMOL/L — LOW (ref 22–31)
CREAT SERPL-MCNC: 0.89 MG/DL — SIGNIFICANT CHANGE UP (ref 0.5–1.3)
CRP SERPL-MCNC: 2.58 MG/DL — HIGH (ref 0–0.4)
ERYTHROCYTE [SEDIMENTATION RATE] IN BLOOD: 26 MM/HR — HIGH
GLUCOSE BLDC GLUCOMTR-MCNC: 120 MG/DL — HIGH (ref 70–99)
GLUCOSE BLDC GLUCOMTR-MCNC: 173 MG/DL — HIGH (ref 70–99)
GLUCOSE BLDC GLUCOMTR-MCNC: 183 MG/DL — HIGH (ref 70–99)
GLUCOSE BLDC GLUCOMTR-MCNC: 208 MG/DL — HIGH (ref 70–99)
GLUCOSE BLDC GLUCOMTR-MCNC: 216 MG/DL — HIGH (ref 70–99)
GLUCOSE SERPL-MCNC: 116 MG/DL — HIGH (ref 70–99)
HCT VFR BLD CALC: 27.3 % — LOW (ref 39–50)
HGB BLD-MCNC: 7.5 G/DL — LOW (ref 13–17)
MCHC RBC-ENTMCNC: 19 PG — LOW (ref 27–34)
MCHC RBC-ENTMCNC: 27.5 GM/DL — LOW (ref 32–36)
MCV RBC AUTO: 69.3 FL — LOW (ref 80–100)
NRBC # BLD: 0 /100 WBCS — SIGNIFICANT CHANGE UP (ref 0–0)
PLATELET # BLD AUTO: 380 K/UL — SIGNIFICANT CHANGE UP (ref 150–400)
POTASSIUM SERPL-MCNC: 4.6 MMOL/L — SIGNIFICANT CHANGE UP (ref 3.5–5.3)
POTASSIUM SERPL-SCNC: 4.6 MMOL/L — SIGNIFICANT CHANGE UP (ref 3.5–5.3)
RBC # BLD: 3.94 M/UL — LOW (ref 4.2–5.8)
RBC # FLD: 19.2 % — HIGH (ref 10.3–14.5)
RH IG SCN BLD-IMP: POSITIVE — SIGNIFICANT CHANGE UP
SODIUM SERPL-SCNC: 135 MMOL/L — SIGNIFICANT CHANGE UP (ref 135–145)
WBC # BLD: 8.45 K/UL — SIGNIFICANT CHANGE UP (ref 3.8–10.5)
WBC # FLD AUTO: 8.45 K/UL — SIGNIFICANT CHANGE UP (ref 3.8–10.5)

## 2020-09-14 PROCEDURE — 99232 SBSQ HOSP IP/OBS MODERATE 35: CPT | Mod: GC

## 2020-09-14 PROCEDURE — 99232 SBSQ HOSP IP/OBS MODERATE 35: CPT

## 2020-09-14 RX ADMIN — ONDANSETRON 4 MILLIGRAM(S): 8 TABLET, FILM COATED ORAL at 17:57

## 2020-09-14 RX ADMIN — Medication 0.12 MILLIGRAM(S): at 04:53

## 2020-09-14 RX ADMIN — CEFEPIME 100 MILLIGRAM(S): 1 INJECTION, POWDER, FOR SOLUTION INTRAMUSCULAR; INTRAVENOUS at 07:35

## 2020-09-14 RX ADMIN — INSULIN GLARGINE 10 UNIT(S): 100 INJECTION, SOLUTION SUBCUTANEOUS at 22:00

## 2020-09-14 RX ADMIN — ROSUVASTATIN CALCIUM 10 MILLIGRAM(S): 5 TABLET ORAL at 22:00

## 2020-09-14 RX ADMIN — Medication 2: at 22:01

## 2020-09-14 RX ADMIN — Medication 4: at 13:08

## 2020-09-14 RX ADMIN — Medication 50 MICROGRAM(S): at 04:53

## 2020-09-14 RX ADMIN — PRASUGREL 10 MILLIGRAM(S): 5 TABLET, FILM COATED ORAL at 11:28

## 2020-09-14 RX ADMIN — GABAPENTIN 200 MILLIGRAM(S): 400 CAPSULE ORAL at 22:02

## 2020-09-14 RX ADMIN — Medication 10 MILLIGRAM(S): at 22:02

## 2020-09-14 RX ADMIN — Medication 2: at 18:52

## 2020-09-14 RX ADMIN — Medication 8 UNIT(S): at 18:53

## 2020-09-14 RX ADMIN — SPIRONOLACTONE 25 MILLIGRAM(S): 25 TABLET, FILM COATED ORAL at 04:53

## 2020-09-14 RX ADMIN — CEFEPIME 100 MILLIGRAM(S): 1 INJECTION, POWDER, FOR SOLUTION INTRAMUSCULAR; INTRAVENOUS at 16:15

## 2020-09-14 RX ADMIN — Medication 81 MILLIGRAM(S): at 04:53

## 2020-09-14 RX ADMIN — DAPTOMYCIN 127 MILLIGRAM(S): 500 INJECTION, POWDER, LYOPHILIZED, FOR SOLUTION INTRAVENOUS at 01:03

## 2020-09-14 RX ADMIN — Medication 81 MILLIGRAM(S): at 17:41

## 2020-09-14 RX ADMIN — PANTOPRAZOLE SODIUM 40 MILLIGRAM(S): 20 TABLET, DELAYED RELEASE ORAL at 11:27

## 2020-09-14 RX ADMIN — GABAPENTIN 100 MILLIGRAM(S): 400 CAPSULE ORAL at 14:26

## 2020-09-14 RX ADMIN — Medication 25 MILLIGRAM(S): at 11:28

## 2020-09-14 RX ADMIN — GABAPENTIN 100 MILLIGRAM(S): 400 CAPSULE ORAL at 04:53

## 2020-09-14 RX ADMIN — SERTRALINE 25 MILLIGRAM(S): 25 TABLET, FILM COATED ORAL at 11:28

## 2020-09-14 RX ADMIN — POLYETHYLENE GLYCOL 3350 17 GRAM(S): 17 POWDER, FOR SOLUTION ORAL at 11:28

## 2020-09-14 RX ADMIN — TAMSULOSIN HYDROCHLORIDE 0.4 MILLIGRAM(S): 0.4 CAPSULE ORAL at 22:02

## 2020-09-14 RX ADMIN — MORPHINE SULFATE 30 MILLIGRAM(S): 50 CAPSULE, EXTENDED RELEASE ORAL at 05:24

## 2020-09-14 RX ADMIN — MORPHINE SULFATE 30 MILLIGRAM(S): 50 CAPSULE, EXTENDED RELEASE ORAL at 04:53

## 2020-09-14 NOTE — PROGRESS NOTE ADULT - SUBJECTIVE AND OBJECTIVE BOX
Interval Events: Reviewed  Patient seen and examined at bedside.    Patient is a 65y old  Male who presents with a chief complaint of R Knee Swelling (14 Sep 2020 16:30)  he is doing OK and going for the graft surgery this week    PAST MEDICAL & SURGICAL HISTORY:  Diabetic neuropathy    STEMI (ST elevation myocardial infarction)    Diverticulitis    MRSA bacteremia    History of celiac disease    CHF (congestive heart failure)  EF ~ 25%    HTN (hypertension)    Diabetes  on insulin pump    Blood clot due to device, implant, or graft  was on blood thinners    HLD (hyperlipidemia)    Osteoarthritis    Atherosclerosis of coronary artery  CAD (coronary artery disease)    Status post percutaneous transluminal coronary angioplasty  in 2012    History of open reduction and internal fixation (ORIF) procedure    Surgery, elective  Right shoulder    Surgery, elective  right knee wound debridement    S/P TKR (total knee replacement), right  with infection Mrsa   per pt he was cleared from MRSA infection    S/P CABG x 1  2018    Stented coronary artery  10/18 heart attack  INFERIOR WALL MI    Other postprocedural status  Fixation hardware in foot LEFT        MEDICATIONS:  Pulmonary:  diphenhydrAMINE   Injectable 25 milliGRAM(s) IV Push every 6 hours PRN    Antimicrobials:  cefepime   IVPB      cefepime   IVPB 2000 milliGRAM(s) IV Intermittent every 8 hours  DAPTOmycin IVPB 675 milliGRAM(s) IV Intermittent every 24 hours  rifAMPin 300 milliGRAM(s) Oral every 12 hours    Anticoagulants:  aspirin  chewable 81 milliGRAM(s) Oral two times a day  prasugrel 10 milliGRAM(s) Oral daily    Cardiac:  digoxin     Tablet 0.125 milliGRAM(s) Oral daily  metoprolol succinate ER 25 milliGRAM(s) Oral daily  spironolactone 25 milliGRAM(s) Oral daily  tamsulosin 0.4 milliGRAM(s) Oral at bedtime      Allergies    ACE inhibitors (Hives)  carvedilol (Other)  enalapril (Hives)  Entresto (Other)    Intolerances        Vital Signs Last 24 Hrs  T(C): 36.7 (14 Sep 2020 21:48), Max: 37.3 (14 Sep 2020 17:32)  T(F): 98 (14 Sep 2020 21:48), Max: 99.1 (14 Sep 2020 17:32)  HR: 82 (14 Sep 2020 21:48) (75 - 87)  BP: 115/72 (14 Sep 2020 21:48) (96/51 - 116/72)  BP(mean): --  RR: 18 (14 Sep 2020 21:48) (16 - 169)  SpO2: 100% (14 Sep 2020 21:48) (96% - 100%)    09-13 @ 07:01  -  09-14 @ 07:00  --------------------------------------------------------  IN: 1110 mL / OUT: 1100 mL / NET: 10 mL          Review of Systems:   •	General: negative  •	Skin/Breast: negative  •	Ophthalmologic: negative  •	ENMT: negative  •	Respiratory and Thorax: negative  •	Cardiovascular: negative  •	Gastrointestinal: negative  •	Genitourinary: negative  •	Musculoskeletal: negative  •	Neurological: negative  •	Psychiatric: negative  •	Hematology/Lymphatics: negative  •	Endocrine: negative  •	Allergic/Immunologic: negative    Physical Exam:   • Constitutional:	Well-developed, well nourished  • Eyes:	EOMI; PERRL; no drainage or redness  • ENMT:	No oral lesions; no gross abnormalities  • Neck	No bruits; no thyromegaly or nodules  • Breasts:	not examined  • Back:	No deformity or limitation of movement  • Respiratory:	Breath Sounds equal & clear to percussion & auscultation, no accessory muscle use  • Cardiovascular:	Regular rate & rhythm, normal S1, S2; no murmurs, gallops or rubs; no S3, S4  • Gastrointestinal:	Soft, non-tender, no hepatosplenomegaly, normal bowel sounds  • Genitourinary:	not examined  • Rectal: not examined  • Extremities:	No cyanosis, clubbing or edema  • Vascular:	Equal and normal pulses (carotid, femoral, dorsalis pedis)  • Neurologica:l	not examined  • Skin:	No lesions; no rash  • Lymph Nodes:	No lymphadedenopathy  • Musculoskeletal:	No joint pain, swelling or deformity; no limitation of movement        LABS:      CBC Full  -  ( 14 Sep 2020 05:56 )  WBC Count : 8.45 K/uL  RBC Count : 3.94 M/uL  Hemoglobin : 7.5 g/dL  Hematocrit : 27.3 %  Platelet Count - Automated : 380 K/uL  Mean Cell Volume : 69.3 fl  Mean Cell Hemoglobin : 19.0 pg  Mean Cell Hemoglobin Concentration : 27.5 gm/dL  Auto Neutrophil # : x  Auto Lymphocyte # : x  Auto Monocyte # : x  Auto Eosinophil # : x  Auto Basophil # : x  Auto Neutrophil % : x  Auto Lymphocyte % : x  Auto Monocyte % : x  Auto Eosinophil % : x  Auto Basophil % : x    09-14    135  |  105  |  31<H>  ----------------------------<  116<H>  4.6   |  18<L>  |  0.89    Ca    8.4      14 Sep 2020 05:56                          RADIOLOGY & ADDITIONAL STUDIES (The following images were personally reviewed):  Loja:                                     No  Urine output:                       adequate  DVT prophylaxis:                 Yes  Flattus:                                  Yes  Bowel movement:              No

## 2020-09-14 NOTE — PROGRESS NOTE ADULT - ASSESSMENT
64 yo M w/ PMH of CAD s/p CABG, CHF, DM, HLD, HTN, acute cholecystitis s/p perc sudhir 2/28, recent R TKA 7/22 presents with R knee infection. General surgery consulted for purulent drainage near prior perc sudhir tube site.     POD 8 s/p bedside I&D at perc sudhir site (9/4) with improvement in erythema, induration and purulent expression. WBCs wnl, afebrile, CRP downtrending. Last LFTs were down trending    Cx from RUQ drainage showed Pseudomonas.     HIDA scan was not able to visualize the gall bladder, patient was not able to tolerate delayed imaging, possible false positive finding  Gallium study didn't confirm the presence of a tract between GB and SQ collection    - Change guaze and tape daily, monitor for increased induration or purulent expression   - Monitor WBCs and fevers  - We advice laparoscopic elective cholecystectomy given medical problems and procedures the patient is currently undergoing, unless the patient developed acute inflammation, fever, sepsis or cholangitis then that time he will need the surgery as an emergency  - Surgery will continue to Follow for dressing change of the I&D

## 2020-09-14 NOTE — PROGRESS NOTE ADULT - SUBJECTIVE AND OBJECTIVE BOX
Ortho Note    Pt comfortable without complaints, pain controlled  Denies CP, SOB, N/V, numbness/tingling     Vital Signs Last 24 Hrs  T(C): 36.7 (09-14-20 @ 12:39), Max: 37.2 (09-14-20 @ 09:12)  T(F): 98.1 (09-14-20 @ 12:39), Max: 99 (09-14-20 @ 09:12)  HR: 87 (09-14-20 @ 12:39) (80 - 87)  BP: 116/72 (09-14-20 @ 12:39) (101/56 - 116/72)  BP(mean): --  RR: 17 (09-14-20 @ 12:39) (17 - 169)  SpO2: 97% (09-14-20 @ 12:39) (97% - 97%)    General: Pt Alert and oriented, NAD  DSG C/D/I Abdomen, Right knee, LLE  Pulses intact b/l LE  Sensation intact b/l LE  Motor: EHL/FHL/TA/GS 5/5 b/l LE                          7.5    8.45  )-----------( 380      ( 14 Sep 2020 05:56 )             27.3   14 Sep 2020 05:56    135    |  105    |  31     ----------------------------<  116    4.6     |  18     |  0.89     A/P: 65yMale s/p Right knee antibiotic spacer 07/22 and s/p R hip exchange of hardware on 09/04/2020  1. Pain control  2. DVT prophylaxis: ASA, Effient   3. PT, weight-bearing status: WBAT BLE   4. No surgery right now per Gen Surg.  5. Continue antibiotics  6. Left leg wound managed by Dr. Mike SAUNDERS, appreciate recs  7. Continue sertraline and gabapentin per psych. Appreciate recs.  8. Dispo: OR Friday for R knee spacer exchange and gastroc flap with plastics    Ortho Pager [phone]

## 2020-09-14 NOTE — PROGRESS NOTE ADULT - SUBJECTIVE AND OBJECTIVE BOX
Ortho Note    Pt comfortable without complaints, pain controlled  Denies CP, SOB, N/V, numbness/tingling     Vital Signs Last 24 Hrs  T(C): 37.1 (14 Sep 2020 04:45), Max: 37.4 (13 Sep 2020 21:20)  T(F): 98.7 (14 Sep 2020 04:45), Max: 99.3 (13 Sep 2020 21:20)  HR: 84 (14 Sep 2020 04:45) (75 - 88)  BP: 96/51 (14 Sep 2020 04:45) (96/51 - 110/51)  BP(mean): --  RR: 18 (14 Sep 2020 04:45) (16 - 18)  SpO2: 99% (14 Sep 2020 04:45) (95% - 100%)      General: Pt Alert and oriented, NAD  DSG C/D/I on RLE knee and over leg of LLE  Pulses: cap refill < 2 secs on RLE  Sensation: dimished sensation in non-dermatomal pattern over RLE  Motor: firing  EHL/FHL/TA/GS                          7.5    9.47  )-----------( 360      ( 13 Sep 2020 07:59 )             27.3   13 Sep 2020 07:59    134    |  104    |  27     ----------------------------<  121    4.4     |  21     |  0.95     Ca    8.5        13 Sep 2020 07:59          A/P: 65yMale s/p R knee antibiotic spacer 07/22 who presented with R knee swelling and RUQ wound drainage on 09/01/2020 and R hip exchange of hardware on 09/04/2020    1. Pain control as needed. Appreciate pain management recs.   2. DVT prophylaxis: ASA, Effient   3. PT, weight-bearing status: WBAT BLE   4. Appreciate Gen Surg recs for RUQ wound. Pt feels that wound is improving. HIDA scan inconclusive. Gallium scan does not show a tract. No surgery right now per Gen Surg.  5. Continue antibiotics. Appreciate ID recs.   6. Left leg wound managed by Dr. Mike SAUNDERS, appreciate recs  7. Continue sertraline and gabapentin per psych. Appreciate recs.  8. Dispo: OR Friday for R knee spacer exchange and gastroc flap with plastics    Ortho Pager [phone]

## 2020-09-14 NOTE — PROGRESS NOTE ADULT - ASSESSMENT
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  He has completed 6 weeks of vancomycin & rifampin.  He is s/p R hip exchange of hardware on 9/4 - OR cultures NGTD.  I&D of abd wall done 9/4 - cultures growing Pseudomonas and Enterococcus faecium. Gallium SPECT done 9/10 showed minimal uptake from GB fossa to soft tissue and was interpreted by surgery as a negative study.     Recommend:  1.  Continue Daptomycin 675 mg IV daily.  CK wnl 9/14; next due 9/21  2.  Continue Cefepime 2 grams IV q8hrs  3.  Continue rifampin 300 mg PO q12   4.  Monitor CBC with differential (to monitor eosinophilia given recent diffuse pruritus)  5. Start topical nystatin to b/l groin for suspected Candida intertrigo    ID Team 2

## 2020-09-14 NOTE — PROGRESS NOTE ADULT - SUBJECTIVE AND OBJECTIVE BOX
INTERVAL HPI/OVERNIGHT EVENTS: Reporting itchiness in b/l groin. Also noticing significant drainage from L shin wound.    CONSTITUTIONAL:  Negative fever or chills, feels well, good appetite  EYES:  Negative  blurry vision or double vision  CARDIOVASCULAR:  Negative for chest pain or palpitations  RESPIRATORY:  Negative for cough, wheezing, or SOB   GASTROINTESTINAL:  Negative for nausea, vomiting, diarrhea, constipation, or abdominal pain  GENITOURINARY:  Negative frequency, urgency or dysuria  NEUROLOGIC:  No headache, confusion, dizziness, lightheadedness      ANTIBIOTICS/RELEVANT:    MEDICATIONS  (STANDING):  aspirin  chewable 81 milliGRAM(s) Oral two times a day  cefepime   IVPB      cefepime   IVPB 2000 milliGRAM(s) IV Intermittent every 8 hours  chlorhexidine 2% Cloths 1 Application(s) Topical <User Schedule>  DAPTOmycin IVPB 675 milliGRAM(s) IV Intermittent every 24 hours  dextrose 5%. 1000 milliLiter(s) (50 mL/Hr) IV Continuous <Continuous>  dextrose 50% Injectable 12.5 Gram(s) IV Push once  dextrose 50% Injectable 25 Gram(s) IV Push once  dextrose 50% Injectable 25 Gram(s) IV Push once  digoxin     Tablet 0.125 milliGRAM(s) Oral daily  gabapentin 100 milliGRAM(s) Oral <User Schedule>  gabapentin 200 milliGRAM(s) Oral at bedtime  insulin glargine Injectable (LANTUS) 10 Unit(s) SubCutaneous at bedtime  insulin lispro (HumaLOG) corrective regimen sliding scale   SubCutaneous Before meals and at bedtime  insulin lispro Injectable (HumaLOG) 8 Unit(s) SubCutaneous three times a day before meals  lactated ringers. 1000 milliLiter(s) (50 mL/Hr) IV Continuous <Continuous>  levothyroxine 50 MICROGram(s) Oral daily  lidocaine 2% Injectable 10 milliLiter(s) Local Injection once  metoprolol succinate ER 25 milliGRAM(s) Oral daily  pantoprazole  Injectable 40 milliGRAM(s) IV Push daily  polyethylene glycol 3350 17 Gram(s) Oral daily  prasugrel 10 milliGRAM(s) Oral daily  rifAMPin 300 milliGRAM(s) Oral every 12 hours  rosuvastatin 10 milliGRAM(s) Oral at bedtime  sertraline 25 milliGRAM(s) Oral daily  spironolactone 25 milliGRAM(s) Oral daily  tamsulosin 0.4 milliGRAM(s) Oral at bedtime    MEDICATIONS  (PRN):  acetaminophen   Tablet .. 650 milliGRAM(s) Oral every 6 hours PRN Temp greater or equal to 38C (100.4F), Mild Pain (1 - 3)  aluminum hydroxide/magnesium hydroxide/simethicone Suspension 30 milliLiter(s) Oral four times a day PRN Indigestion  bisacodyl Suppository 10 milliGRAM(s) Rectal daily PRN If no bowel movement by POD#2  cyclobenzaprine 5 milliGRAM(s) Oral three times a day PRN Muscle Spasm  dextrose 40% Gel 15 Gram(s) Oral once PRN Blood Glucose LESS THAN 70 milliGRAM(s)/deciliter  diphenhydrAMINE   Injectable 25 milliGRAM(s) IV Push every 6 hours PRN Rash and/or Itching  glucagon  Injectable 1 milliGRAM(s) IntraMuscular once PRN Glucose LESS THAN 70 milligrams/deciliter  metoclopramide Injectable 10 milliGRAM(s) IV Push every 6 hours PRN breakthrough nausea  morphine  - Injectable 2 milliGRAM(s) IV Push every 4 hours PRN breakthrough pain  morphine  IR 15 milliGRAM(s) Oral every 4 hours PRN Moderate Pain (4 - 6)  morphine  IR 30 milliGRAM(s) Oral every 4 hours PRN Severe Pain (7 - 10)  ondansetron Injectable 4 milliGRAM(s) IV Push every 6 hours PRN Nausea and/or Vomiting  senna 2 Tablet(s) Oral at bedtime PRN Constipation        Vital Signs Last 24 Hrs  T(C): 36.7 (14 Sep 2020 12:39), Max: 37.4 (13 Sep 2020 21:20)  T(F): 98.1 (14 Sep 2020 12:39), Max: 99.3 (13 Sep 2020 21:20)  HR: 87 (14 Sep 2020 12:39) (75 - 87)  BP: 116/72 (14 Sep 2020 12:39) (96/51 - 116/72)  BP(mean): --  RR: 17 (14 Sep 2020 12:39) (16 - 169)  SpO2: 97% (14 Sep 2020 12:39) (96% - 99%)    PHYSICAL EXAM:  Constitutional:Well-developed, well nourished  Eyes:DAMARIS, EOMI  Ear/Nose/Throat: no oral lesion, no sinus tenderness on percussion	  Neck:no JVD, no lymphadenopathy, supple  Respiratory: CTA colton  Cardiovascular: S1S2 RRR, no murmurs  Gastrointestinal:soft, (+) BS, no HSM  Extremities:no e/e/c  Vascular: DP Pulse:	right normal; left normal      LABS:                        7.5    8.45  )-----------( 380      ( 14 Sep 2020 05:56 )             27.3     09-14    135  |  105  |  31<H>  ----------------------------<  116<H>  4.6   |  18<L>  |  0.89    Ca    8.4      14 Sep 2020 05:56            MICROBIOLOGY: reviewed    RADIOLOGY & ADDITIONAL STUDIES: reviewed

## 2020-09-14 NOTE — PROGRESS NOTE ADULT - SUBJECTIVE AND OBJECTIVE BOX
No acute changes overnight.    The patient may undergo another procedure by Ortho this week.    No abdominal pain. Denies fever, chills nausea or vomiting.      MEDICATIONS  (STANDING):  aspirin  chewable 81 milliGRAM(s) Oral two times a day  cefepime   IVPB      cefepime   IVPB 2000 milliGRAM(s) IV Intermittent every 8 hours  chlorhexidine 2% Cloths 1 Application(s) Topical <User Schedule>  DAPTOmycin IVPB 675 milliGRAM(s) IV Intermittent every 24 hours  dextrose 5%. 1000 milliLiter(s) (50 mL/Hr) IV Continuous <Continuous>  dextrose 50% Injectable 12.5 Gram(s) IV Push once  dextrose 50% Injectable 25 Gram(s) IV Push once  dextrose 50% Injectable 25 Gram(s) IV Push once  digoxin     Tablet 0.125 milliGRAM(s) Oral daily  gabapentin 100 milliGRAM(s) Oral <User Schedule>  gabapentin 200 milliGRAM(s) Oral at bedtime  insulin glargine Injectable (LANTUS) 10 Unit(s) SubCutaneous at bedtime  insulin lispro (HumaLOG) corrective regimen sliding scale   SubCutaneous Before meals and at bedtime  insulin lispro Injectable (HumaLOG) 8 Unit(s) SubCutaneous three times a day before meals  lactated ringers. 1000 milliLiter(s) (50 mL/Hr) IV Continuous <Continuous>  levothyroxine 50 MICROGram(s) Oral daily  lidocaine 2% Injectable 10 milliLiter(s) Local Injection once  metoprolol succinate ER 25 milliGRAM(s) Oral daily  nystatin Powder 1 Application(s) Topical two times a day  pantoprazole  Injectable 40 milliGRAM(s) IV Push daily  polyethylene glycol 3350 17 Gram(s) Oral daily  prasugrel 10 milliGRAM(s) Oral daily  rifAMPin 300 milliGRAM(s) Oral every 12 hours  rosuvastatin 10 milliGRAM(s) Oral at bedtime  sertraline 25 milliGRAM(s) Oral daily  spironolactone 25 milliGRAM(s) Oral daily  tamsulosin 0.4 milliGRAM(s) Oral at bedtime    MEDICATIONS  (PRN):  acetaminophen   Tablet .. 650 milliGRAM(s) Oral every 6 hours PRN Temp greater or equal to 38C (100.4F), Mild Pain (1 - 3)  aluminum hydroxide/magnesium hydroxide/simethicone Suspension 30 milliLiter(s) Oral four times a day PRN Indigestion  bisacodyl Suppository 10 milliGRAM(s) Rectal daily PRN If no bowel movement by POD#2  cyclobenzaprine 5 milliGRAM(s) Oral three times a day PRN Muscle Spasm  dextrose 40% Gel 15 Gram(s) Oral once PRN Blood Glucose LESS THAN 70 milliGRAM(s)/deciliter  diphenhydrAMINE   Injectable 25 milliGRAM(s) IV Push every 6 hours PRN Rash and/or Itching  glucagon  Injectable 1 milliGRAM(s) IntraMuscular once PRN Glucose LESS THAN 70 milligrams/deciliter  metoclopramide Injectable 10 milliGRAM(s) IV Push every 6 hours PRN breakthrough nausea  morphine  - Injectable 2 milliGRAM(s) IV Push every 4 hours PRN breakthrough pain  morphine  IR 15 milliGRAM(s) Oral every 4 hours PRN Moderate Pain (4 - 6)  morphine  IR 30 milliGRAM(s) Oral every 4 hours PRN Severe Pain (7 - 10)  ondansetron Injectable 4 milliGRAM(s) IV Push every 6 hours PRN Nausea and/or Vomiting  senna 2 Tablet(s) Oral at bedtime PRN Constipation      I&O's Detail    13 Sep 2020 07:01  -  14 Sep 2020 07:00  --------------------------------------------------------  IN:    IV PiggyBack: 50 mL    IV PiggyBack: 100 mL    Oral Fluid: 960 mL  Total IN: 1110 mL    OUT:    Voided (mL): 1100 mL  Total OUT: 1100 mL    Total NET: 10 mL      14 Sep 2020 07:01  -  15 Sep 2020 06:24  --------------------------------------------------------  IN:    IV PiggyBack: 50 mL    IV PiggyBack: 50 mL  Total IN: 100 mL    OUT:    Voided (mL): 600 mL  Total OUT: 600 mL    Total NET: -500 mL      Vital Signs Last 24 Hrs  T(C): 36.7 (15 Sep 2020 05:52), Max: 37.3 (14 Sep 2020 17:32)  T(F): 98.1 (15 Sep 2020 05:52), Max: 99.1 (14 Sep 2020 17:32)  HR: 84 (15 Sep 2020 05:52) (78 - 87)  BP: 108/68 (15 Sep 2020 05:52) (101/56 - 116/72)  BP(mean): --  RR: 18 (15 Sep 2020 05:52) (17 - 169)  SpO2: 95% (15 Sep 2020 05:52) (95% - 100%)    Physical Exam:  General: NAD  Pulmonary: Nonlabored breathing, no respiratory distress  Abdominal: soft, NT/ND, small open wound about 1 cm in RUQ with minimal discharge, no rebound tenderness, guarding, rigidity  Extremities: WWP, SCDs in place                          8.3    9.43  )-----------( 373      ( 15 Sep 2020 05:55 )             29.9   09-14    135  |  105  |  31<H>  ----------------------------<  116<H>  4.6   |  18<L>  |  0.89    Ca    8.4      14 Sep 2020 05:56

## 2020-09-14 NOTE — PROGRESS NOTE ADULT - ATTENDING COMMENTS
Patient seen and examined with house-staff during bedside rounds.  Resident note read, including vitals, physical findings, laboratory data, and radiological reports.   Revisions included below.  Direct personal management at bed side and extensive interpretation of the data.  Plan was outlined and discussed in details with the housestaff.  Decision making of high complexity  Action taken for acute disease activity to reflect the level of care provided:  - medication reconciliation  - review laboratory data  he is stable  BS is better controlled  Hb is unchanged

## 2020-09-15 ENCOUNTER — TRANSCRIPTION ENCOUNTER (OUTPATIENT)
Age: 65
End: 2020-09-15

## 2020-09-15 LAB
ANION GAP SERPL CALC-SCNC: 9 MMOL/L — SIGNIFICANT CHANGE UP (ref 5–17)
BASOPHILS # BLD AUTO: 0.08 K/UL — SIGNIFICANT CHANGE UP (ref 0–0.2)
BASOPHILS NFR BLD AUTO: 0.8 % — SIGNIFICANT CHANGE UP (ref 0–2)
BUN SERPL-MCNC: 28 MG/DL — HIGH (ref 7–23)
CALCIUM SERPL-MCNC: 8.7 MG/DL — SIGNIFICANT CHANGE UP (ref 8.4–10.5)
CHLORIDE SERPL-SCNC: 104 MMOL/L — SIGNIFICANT CHANGE UP (ref 96–108)
CO2 SERPL-SCNC: 21 MMOL/L — LOW (ref 22–31)
CREAT SERPL-MCNC: 0.95 MG/DL — SIGNIFICANT CHANGE UP (ref 0.5–1.3)
CRP SERPL-MCNC: 2.46 MG/DL — HIGH (ref 0–0.4)
EOSINOPHIL # BLD AUTO: 0.45 K/UL — SIGNIFICANT CHANGE UP (ref 0–0.5)
EOSINOPHIL NFR BLD AUTO: 4.8 % — SIGNIFICANT CHANGE UP (ref 0–6)
ERYTHROCYTE [SEDIMENTATION RATE] IN BLOOD: 23 MM/HR — HIGH
GLUCOSE BLDC GLUCOMTR-MCNC: 103 MG/DL — HIGH (ref 70–99)
GLUCOSE BLDC GLUCOMTR-MCNC: 114 MG/DL — HIGH (ref 70–99)
GLUCOSE BLDC GLUCOMTR-MCNC: 138 MG/DL — HIGH (ref 70–99)
GLUCOSE BLDC GLUCOMTR-MCNC: 163 MG/DL — HIGH (ref 70–99)
GLUCOSE SERPL-MCNC: 105 MG/DL — HIGH (ref 70–99)
HCT VFR BLD CALC: 29.9 % — LOW (ref 39–50)
HGB BLD-MCNC: 8.3 G/DL — LOW (ref 13–17)
IMM GRANULOCYTES NFR BLD AUTO: 0.5 % — SIGNIFICANT CHANGE UP (ref 0–1.5)
LYMPHOCYTES # BLD AUTO: 1.15 K/UL — SIGNIFICANT CHANGE UP (ref 1–3.3)
LYMPHOCYTES # BLD AUTO: 12.2 % — LOW (ref 13–44)
MCHC RBC-ENTMCNC: 19.3 PG — LOW (ref 27–34)
MCHC RBC-ENTMCNC: 27.8 GM/DL — LOW (ref 32–36)
MCV RBC AUTO: 69.4 FL — LOW (ref 80–100)
MONOCYTES # BLD AUTO: 1.32 K/UL — HIGH (ref 0–0.9)
MONOCYTES NFR BLD AUTO: 14 % — SIGNIFICANT CHANGE UP (ref 2–14)
NEUTROPHILS # BLD AUTO: 6.38 K/UL — SIGNIFICANT CHANGE UP (ref 1.8–7.4)
NEUTROPHILS NFR BLD AUTO: 67.7 % — SIGNIFICANT CHANGE UP (ref 43–77)
NRBC # BLD: 0 /100 WBCS — SIGNIFICANT CHANGE UP (ref 0–0)
PLATELET # BLD AUTO: 373 K/UL — SIGNIFICANT CHANGE UP (ref 150–400)
POTASSIUM SERPL-MCNC: 4.4 MMOL/L — SIGNIFICANT CHANGE UP (ref 3.5–5.3)
POTASSIUM SERPL-SCNC: 4.4 MMOL/L — SIGNIFICANT CHANGE UP (ref 3.5–5.3)
RBC # BLD: 4.31 M/UL — SIGNIFICANT CHANGE UP (ref 4.2–5.8)
RBC # FLD: 19.4 % — HIGH (ref 10.3–14.5)
SODIUM SERPL-SCNC: 134 MMOL/L — LOW (ref 135–145)
WBC # BLD: 9.43 K/UL — SIGNIFICANT CHANGE UP (ref 3.8–10.5)
WBC # FLD AUTO: 9.43 K/UL — SIGNIFICANT CHANGE UP (ref 3.8–10.5)

## 2020-09-15 PROCEDURE — 99232 SBSQ HOSP IP/OBS MODERATE 35: CPT | Mod: GC

## 2020-09-15 RX ORDER — NYSTATIN CREAM 100000 [USP'U]/G
1 CREAM TOPICAL
Refills: 0 | Status: DISCONTINUED | OUTPATIENT
Start: 2020-09-15 | End: 2020-09-15

## 2020-09-15 RX ADMIN — CEFEPIME 100 MILLIGRAM(S): 1 INJECTION, POWDER, FOR SOLUTION INTRAMUSCULAR; INTRAVENOUS at 16:47

## 2020-09-15 RX ADMIN — ROSUVASTATIN CALCIUM 10 MILLIGRAM(S): 5 TABLET ORAL at 21:43

## 2020-09-15 RX ADMIN — Medication 81 MILLIGRAM(S): at 17:36

## 2020-09-15 RX ADMIN — TAMSULOSIN HYDROCHLORIDE 0.4 MILLIGRAM(S): 0.4 CAPSULE ORAL at 21:43

## 2020-09-15 RX ADMIN — DAPTOMYCIN 127 MILLIGRAM(S): 500 INJECTION, POWDER, LYOPHILIZED, FOR SOLUTION INTRAVENOUS at 01:26

## 2020-09-15 RX ADMIN — POLYETHYLENE GLYCOL 3350 17 GRAM(S): 17 POWDER, FOR SOLUTION ORAL at 14:07

## 2020-09-15 RX ADMIN — SPIRONOLACTONE 25 MILLIGRAM(S): 25 TABLET, FILM COATED ORAL at 06:00

## 2020-09-15 RX ADMIN — GABAPENTIN 200 MILLIGRAM(S): 400 CAPSULE ORAL at 21:43

## 2020-09-15 RX ADMIN — Medication 25 MILLIGRAM(S): at 09:46

## 2020-09-15 RX ADMIN — Medication 81 MILLIGRAM(S): at 06:00

## 2020-09-15 RX ADMIN — CEFEPIME 100 MILLIGRAM(S): 1 INJECTION, POWDER, FOR SOLUTION INTRAMUSCULAR; INTRAVENOUS at 00:42

## 2020-09-15 RX ADMIN — Medication 8 UNIT(S): at 14:23

## 2020-09-15 RX ADMIN — GABAPENTIN 100 MILLIGRAM(S): 400 CAPSULE ORAL at 06:00

## 2020-09-15 RX ADMIN — GABAPENTIN 100 MILLIGRAM(S): 400 CAPSULE ORAL at 14:06

## 2020-09-15 RX ADMIN — PANTOPRAZOLE SODIUM 40 MILLIGRAM(S): 20 TABLET, DELAYED RELEASE ORAL at 14:06

## 2020-09-15 RX ADMIN — Medication 25 MILLIGRAM(S): at 06:00

## 2020-09-15 RX ADMIN — CEFEPIME 100 MILLIGRAM(S): 1 INJECTION, POWDER, FOR SOLUTION INTRAMUSCULAR; INTRAVENOUS at 07:45

## 2020-09-15 RX ADMIN — PRASUGREL 10 MILLIGRAM(S): 5 TABLET, FILM COATED ORAL at 14:06

## 2020-09-15 RX ADMIN — Medication 8 UNIT(S): at 18:12

## 2020-09-15 RX ADMIN — Medication 0.12 MILLIGRAM(S): at 06:00

## 2020-09-15 RX ADMIN — Medication 8 UNIT(S): at 09:46

## 2020-09-15 RX ADMIN — CEFEPIME 100 MILLIGRAM(S): 1 INJECTION, POWDER, FOR SOLUTION INTRAMUSCULAR; INTRAVENOUS at 16:57

## 2020-09-15 RX ADMIN — Medication 50 MICROGRAM(S): at 06:01

## 2020-09-15 RX ADMIN — Medication 25 MILLIGRAM(S): at 21:49

## 2020-09-15 RX ADMIN — INSULIN GLARGINE 10 UNIT(S): 100 INJECTION, SOLUTION SUBCUTANEOUS at 21:43

## 2020-09-15 RX ADMIN — SERTRALINE 25 MILLIGRAM(S): 25 TABLET, FILM COATED ORAL at 14:06

## 2020-09-15 RX ADMIN — Medication 2: at 18:11

## 2020-09-15 NOTE — PROGRESS NOTE ADULT - SUBJECTIVE AND OBJECTIVE BOX
Pain Management Progress Note - [facility] [phone]        HPI: Patient seen and examined today. Patient with a history of knee pain, diabetic neuropathy, CHF, HTN, MRSA bacteremia, diverticulitis, COPD, hyperkalemia, chronic systolic CHF, osteoarthritis, s/p R Revision TKA and antibiotic spacer by Dr. Castro on 7/22, now presenting with R knee pain and swelling x3 days. Patient reports R hip and R knee pain, pain managed with Morphine PO. Patient Axox3, denies any itchiness from Morphine Po.  Reviewed pain medication regimen with patient at bedside. LLE dressing intact.       Pain is _x__ sharp ____dull ___burning _x__achy ___ Intensity: ____ mild _x__mod x__severe     Location _x___surgical site ____cervical _____lumbar ____abd ____upper ext____lower ext    Worse with _x___activity _x___movement _____physical therapy___ Rest    Improved with _x___medication __x__rest ____physical therapy    nystatin Cream  morphine  IR  morphine  IR  nystatin Powder  morphine  - Injectable  chlorhexidine 2% Cloths  insulin glargine Injectable (LANTUS)  insulin glargine Injectable (LANTUS)  insulin glargine Injectable (LANTUS)  pantoprazole  Injectable  metoclopramide Injectable  acetaminophen   Tablet ..  diphenhydrAMINE   Injectable  lactated ringers.  levothyroxine  sodium chloride 0.9% Bolus  gabapentin  gabapentin  gabapentin  sertraline  insulin glargine Injectable (LANTUS)  morphine  IR  morphine  - Injectable  HYDROmorphone   Tablet  HYDROmorphone   Tablet  (ADM OVERRIDE)  insulin lispro Injectable (HumaLOG)  diphenhydrAMINE  insulin glargine Injectable (LANTUS)  diazepam    Tablet  cefepime   IVPB  cefepime   IVPB  cefepime   IVPB  DAPTOmycin IVPB  insulin lispro Injectable (HumaLOG)  insulin glargine Injectable (LANTUS)  insulin lispro (HumaLOG) corrective regimen sliding scale  HYDROmorphone  Injectable  rifAMPin  glucagon  Injectable  dextrose 50% Injectable  dextrose 50% Injectable  dextrose 50% Injectable  dextrose 40% Gel  dextrose 5%.  lidocaine 2% Injectable  lidocaine 2% Injectable  tamsulosin  rosuvastatin  gabapentin  cyclobenzaprine  spironolactone  atorvastatin  metoprolol succinate ER  prasugrel  digoxin     Tablet  vancomycin  IVPB  aspirin  chewable  HYDROmorphone  Injectable  senna  bisacodyl Suppository  polyethylene glycol 3350  ondansetron Injectable  aluminum hydroxide/magnesium hydroxide/simethicone Suspension  acetaminophen   Tablet ..  oxyCODONE    IR  oxyCODONE    IR  morphine  - Injectable  vancomycin  IVPB  vancomycin  IVPB  vancomycin  IVPB  oxyCODONE    IR  oxyCODONE    IR  rosuvastatin  lactated ringers.  chlorhexidine 2% Cloths  povidone iodine 5% Nasal Swab  spironolactone  diphenhydrAMINE  chlorhexidine 4% Liquid  sodium chloride 0.9% lock flush  cyclobenzaprine  gabapentin  HYDROmorphone  Injectable  HYDROmorphone   Tablet  HYDROmorphone   Tablet  HYDROmorphone  Injectable  glucagon  Injectable  dextrose 50% Injectable  dextrose 50% Injectable  dextrose 50% Injectable  dextrose 40% Gel  dextrose 5%.  insulin lispro (HumaLOG) corrective regimen sliding scale  tamsulosin  spironolactone  vancomycin  IVPB  pantoprazole    Tablet  silver sulfADIAZINE 1% Cream  metoprolol succinate ER  rifAMPin  prasugrel  atorvastatin  DULoxetine  tamsulosin Oral Tab/Cap - Peds  oxyCODONE    IR  oxyCODONE  ER Tablet  celecoxib  aspirin enteric coated  acetaminophen   Tablet ..  spironolactone Oral Tab/Cap - Peds  HYDROmorphone  Injectable      ROS: Const:  -___febrile   Eyes:___ENT:___CV: __-_chest pain  Resp: __-__sob  GI:_-__nausea __-_vomiting _x_ abd pain ___npo ___clears _x_full diet __bm  :___ Musk: _x__pain _-__spasm  Skin:___ Neuro:  _-__vypnkbyz_-__prapphbfy_-__ numbness _-__weakness __x_paresth  Psych:_-_anxiety  Endo:___ Heme:___Allergy:_________, _x__all others reviewed and negative      PAST MEDICAL & SURGICAL HISTORY:  Diabetic neuropathy  STEMI (ST elevation myocardial infarction)  Diverticulitis  MRSA bacteremia  History of celiac disease  CHF (congestive heart failure): EF ~ 25%  HTN (hypertension)  Diabetes: on insulin pump  Blood clot due to device, implant, or graft: was on blood thinners  HLD (hyperlipidemia)  Osteoarthritis  Atherosclerosis of coronary artery: CAD (coronary artery disease)  Status post percutaneous transluminal coronary angioplasty: in 2012  History of open reduction and internal fixation (ORIF) procedure  Surgery, elective: Right shoulder  Surgery, elective: right knee wound debridement  S/P TKR (total knee replacement), right: with infection Mrsa   per pt he was cleared from MRSA infection  S/P CABG x 1: 2018  Stented coronary artery: 10/18 heart attack  INFERIOR WALL MI  Other postprocedural status: Fixation hardware in foot LEFT        09-15 @ 05:5585 mL/min/1.73M2      Hemoglobin: 8.3 g/dL (09-15 @ 05:55)  Hemoglobin: 7.5 g/dL (09-14 @ 05:56)        T(C): 36.6 (09-15-20 @ 12:43), Max: 37.3 (09-14-20 @ 17:32)  HR: 74 (09-15-20 @ 12:43) (74 - 84)  BP: 99/57 (09-15-20 @ 12:43) (99/57 - 115/72)  RR: 17 (09-15-20 @ 12:43) (16 - 18)  SpO2: 99% (09-15-20 @ 12:43) (95% - 100%)  Wt(kg): --          PHYSICAL EXAM:  Gen Appearance: x___no acute distress _x__appropriate        Neuro: _x__SILT feet____ EOM Intact Psych: AAOX__3, x___mood/affect appropriate        Eyes: __x_conjunctiva WNL  __x__ Pupils equal and round        ENT: x___ears and nose atraumatic_x__ Hearing grossly intact        Neck: _x__trachea midline, no visible masses ___thyroid without palpable mass    Resp: _x__Nml WOB____No tactile fremitus ___clear to auscultation    Cardio: ___extremities free from edema __x__pedal pulses palpable    GI/Abdomen: __x_soft ___x__ Nontender___x___Nondistended_____HSM    Lymphatic: ___no palpable nodes in neck  ___no palpable nodes calves and feet    Skin/Wound: ___Incision, _x__Dressing c/d/i,   ____surrounding tissues soft,  ___drain/chest tube present____    Muscular: EHL _4__/5  Gastrocnemius_4__/5    ___absent clubbing/cyanosis          ASSESSMENT: This is a 65y old Male with a history of knee pain, diabetic neuropathy, CHF, HTN, MRSA bacteremia, diverticulitis, COPD, hyperkalemia, chronic systolic CHF, osteoarthritis,  s/p R Revision TKA and antibiotic spacer by Dr. Castro on 7/22, now presenting with R knee pain and swelling x3 days, pain managed with current pain medication regimen.       Recommended Treatment PLAN:  1. Morphine IR 15-30mg Po Q4h prn moderate to severe pain  2. Flexeril 5mg PO Q8h prn muscle spasms  3. Gabapentin 100mg PO TID  4. Morphine 2mg IVP Q4h prn breakthrough pain  Plan discussed with Dr. Max          Pain Management Progress Note - [facility] [phone]        HPI: Patient seen and examined today. Patient with a history of knee pain, diabetic neuropathy, CHF, HTN, MRSA bacteremia, diverticulitis, COPD, hyperkalemia, chronic systolic CHF, osteoarthritis, s/p R Revision TKA and antibiotic spacer by Dr. Castro on 7/22, now presenting with R knee pain and swelling x3 days. Patient reports R hip and R knee pain, pain managed with Morphine PO. Patient Axox3, denies any itchiness from Morphine Po.  Reviewed pain medication regimen with patient at bedside. LLE dressing intact.       Pain is _x__ sharp ____dull ___burning _x__achy ___ Intensity: ____ mild _x__mod x__severe     Location _x___surgical site ____cervical _____lumbar ____abd ____upper ext____lower ext    Worse with _x___activity _x___movement _____physical therapy___ Rest    Improved with _x___medication __x__rest ____physical therapy    nystatin Cream  morphine  IR  morphine  IR  nystatin Powder  morphine  - Injectable  chlorhexidine 2% Cloths  insulin glargine Injectable (LANTUS)  insulin glargine Injectable (LANTUS)  insulin glargine Injectable (LANTUS)  pantoprazole  Injectable  metoclopramide Injectable  acetaminophen   Tablet ..  diphenhydrAMINE   Injectable  lactated ringers.  levothyroxine  sodium chloride 0.9% Bolus  gabapentin  gabapentin  gabapentin  sertraline  insulin glargine Injectable (LANTUS)  morphine  IR  morphine  - Injectable  HYDROmorphone   Tablet  HYDROmorphone   Tablet  (ADM OVERRIDE)  insulin lispro Injectable (HumaLOG)  diphenhydrAMINE  insulin glargine Injectable (LANTUS)  diazepam    Tablet  cefepime   IVPB  cefepime   IVPB  cefepime   IVPB  DAPTOmycin IVPB  insulin lispro Injectable (HumaLOG)  insulin glargine Injectable (LANTUS)  insulin lispro (HumaLOG) corrective regimen sliding scale  HYDROmorphone  Injectable  rifAMPin  glucagon  Injectable  dextrose 50% Injectable  dextrose 50% Injectable  dextrose 50% Injectable  dextrose 40% Gel  dextrose 5%.  lidocaine 2% Injectable  lidocaine 2% Injectable  tamsulosin  rosuvastatin  gabapentin  cyclobenzaprine  spironolactone  atorvastatin  metoprolol succinate ER  prasugrel  digoxin     Tablet  vancomycin  IVPB  aspirin  chewable  HYDROmorphone  Injectable  senna  bisacodyl Suppository  polyethylene glycol 3350  ondansetron Injectable  aluminum hydroxide/magnesium hydroxide/simethicone Suspension  acetaminophen   Tablet ..  oxyCODONE    IR  oxyCODONE    IR  morphine  - Injectable  vancomycin  IVPB  vancomycin  IVPB  vancomycin  IVPB  oxyCODONE    IR  oxyCODONE    IR  rosuvastatin  lactated ringers.  chlorhexidine 2% Cloths  povidone iodine 5% Nasal Swab  spironolactone  diphenhydrAMINE  chlorhexidine 4% Liquid  sodium chloride 0.9% lock flush  cyclobenzaprine  gabapentin  HYDROmorphone  Injectable  HYDROmorphone   Tablet  HYDROmorphone   Tablet  HYDROmorphone  Injectable  glucagon  Injectable  dextrose 50% Injectable  dextrose 50% Injectable  dextrose 50% Injectable  dextrose 40% Gel  dextrose 5%.  insulin lispro (HumaLOG) corrective regimen sliding scale  tamsulosin  spironolactone  vancomycin  IVPB  pantoprazole    Tablet  silver sulfADIAZINE 1% Cream  metoprolol succinate ER  rifAMPin  prasugrel  atorvastatin  DULoxetine  tamsulosin Oral Tab/Cap - Peds  oxyCODONE    IR  oxyCODONE  ER Tablet  celecoxib  aspirin enteric coated  acetaminophen   Tablet ..  spironolactone Oral Tab/Cap - Peds  HYDROmorphone  Injectable      ROS: Const:  -___febrile   Eyes:___ENT:___CV: __-_chest pain  Resp: __-__sob  GI:_-__nausea __-_vomiting _x_ abd pain ___npo ___clears _x_full diet __bm  :___ Musk: _x__pain _-__spasm  Skin:___ Neuro:  _-__kojqqovx_-__uvcbohrmw_-__ numbness _-__weakness __x_paresth  Psych:_-_anxiety  Endo:___ Heme:___Allergy:_________, _x__all others reviewed and negative      PAST MEDICAL & SURGICAL HISTORY:  Diabetic neuropathy  STEMI (ST elevation myocardial infarction)  Diverticulitis  MRSA bacteremia  History of celiac disease  CHF (congestive heart failure): EF ~ 25%  HTN (hypertension)  Diabetes: on insulin pump  Blood clot due to device, implant, or graft: was on blood thinners  HLD (hyperlipidemia)  Osteoarthritis  Atherosclerosis of coronary artery: CAD (coronary artery disease)  Status post percutaneous transluminal coronary angioplasty: in 2012  History of open reduction and internal fixation (ORIF) procedure  Surgery, elective: Right shoulder  Surgery, elective: right knee wound debridement  S/P TKR (total knee replacement), right: with infection Mrsa   per pt he was cleared from MRSA infection  S/P CABG x 1: 2018  Stented coronary artery: 10/18 heart attack  INFERIOR WALL MI  Other postprocedural status: Fixation hardware in foot LEFT        09-15 @ 05:5585 mL/min/1.73M2      Hemoglobin: 8.3 g/dL (09-15 @ 05:55)  Hemoglobin: 7.5 g/dL (09-14 @ 05:56)        T(C): 36.6 (09-15-20 @ 12:43), Max: 37.3 (09-14-20 @ 17:32)  HR: 74 (09-15-20 @ 12:43) (74 - 84)  BP: 99/57 (09-15-20 @ 12:43) (99/57 - 115/72)  RR: 17 (09-15-20 @ 12:43) (16 - 18)  SpO2: 99% (09-15-20 @ 12:43) (95% - 100%)  Wt(kg): --          PHYSICAL EXAM:  Gen Appearance: x___no acute distress _x__appropriate        Neuro: _x__SILT feet____ EOM Intact Psych: AAOX__3, x___mood/affect appropriate        Eyes: __x_conjunctiva WNL  __x__ Pupils equal and round        ENT: x___ears and nose atraumatic_x__ Hearing grossly intact        Neck: _x__trachea midline, no visible masses ___thyroid without palpable mass    Resp: _x__Nml WOB____No tactile fremitus ___clear to auscultation    Cardio: ___extremities free from edema __x__pedal pulses palpable    GI/Abdomen: __x_soft ___x__ Nontender___x___Nondistended_____HSM    Lymphatic: ___no palpable nodes in neck  ___no palpable nodes calves and feet    Skin/Wound: ___Incision, _x__Dressing c/d/i,   ____surrounding tissues soft,  ___drain/chest tube present____    Muscular: EHL _4__/5  Gastrocnemius_4__/5    ___absent clubbing/cyanosis          ASSESSMENT: This is a 65y old Male with a history of knee pain, diabetic neuropathy, CHF, HTN, MRSA bacteremia, diverticulitis, COPD, hyperkalemia, chronic systolic CHF, osteoarthritis,  s/p R Revision TKA and antibiotic spacer by Dr. Castro on 7/22, now presenting with R knee pain and swelling x3 days, pain managed with current pain medication regimen.       Recommended Treatment PLAN:  1. Morphine IR 15-30mg Po Q4h prn moderate to severe pain  2. Flexeril 5mg PO Q8h prn muscle spasms  3. Gabapentin 100mg PO TID  4. Morphine 2mg IVP Q4h prn breakthrough pain  Plan discussed with Dr. Max at bedside

## 2020-09-15 NOTE — DISCHARGE NOTE PROVIDER - NSDCFUSCHEDAPPT_GEN_ALL_CORE_FT
FLOWER MARISCAL ; 09/28/2020 ; [facility] [address] [facility] [place] ; 11/10/2020 ; [facility] [address] [facility] [address] [place] ; 11/10/2020 ; [facility] [address] [facility]

## 2020-09-15 NOTE — PROGRESS NOTE ADULT - SUBJECTIVE AND OBJECTIVE BOX
Interval Events: Reviewed  Patient seen and examined at bedside.    Patient is a 65y old  Male who presents with a chief complaint of R Knee Swelling (15 Sep 2020 15:46)  he is doing well    PAST MEDICAL & SURGICAL HISTORY:  Diabetic neuropathy    STEMI (ST elevation myocardial infarction)    Diverticulitis    MRSA bacteremia    History of celiac disease    CHF (congestive heart failure)  EF ~ 25%    HTN (hypertension)    Diabetes  on insulin pump    Blood clot due to device, implant, or graft  was on blood thinners    HLD (hyperlipidemia)    Osteoarthritis    Atherosclerosis of coronary artery  CAD (coronary artery disease)    Status post percutaneous transluminal coronary angioplasty  in 2012    History of open reduction and internal fixation (ORIF) procedure    Surgery, elective  Right shoulder    Surgery, elective  right knee wound debridement    S/P TKR (total knee replacement), right  with infection Mrsa   per pt he was cleared from MRSA infection    S/P CABG x 1  2018    Stented coronary artery  10/18 heart attack  INFERIOR WALL MI    Other postprocedural status  Fixation hardware in foot LEFT        MEDICATIONS:  Pulmonary:  diphenhydrAMINE   Injectable 25 milliGRAM(s) IV Push every 6 hours PRN    Antimicrobials:  cefepime   IVPB      cefepime   IVPB 2000 milliGRAM(s) IV Intermittent every 8 hours  DAPTOmycin IVPB 675 milliGRAM(s) IV Intermittent every 24 hours  rifAMPin 300 milliGRAM(s) Oral every 12 hours    Anticoagulants:  aspirin  chewable 81 milliGRAM(s) Oral two times a day  prasugrel 10 milliGRAM(s) Oral daily    Cardiac:  digoxin     Tablet 0.125 milliGRAM(s) Oral daily  metoprolol succinate ER 25 milliGRAM(s) Oral daily  spironolactone 25 milliGRAM(s) Oral daily  tamsulosin 0.4 milliGRAM(s) Oral at bedtime      Allergies    ACE inhibitors (Hives)  carvedilol (Other)  enalapril (Hives)  Entresto (Other)    Intolerances        Vital Signs Last 24 Hrs  T(C): 36.2 (15 Sep 2020 18:35), Max: 37 (15 Sep 2020 00:45)  T(F): 97.2 (15 Sep 2020 18:35), Max: 98.6 (15 Sep 2020 00:45)  HR: 91 (15 Sep 2020 18:35) (74 - 91)  BP: 114/70 (15 Sep 2020 18:35) (99/57 - 115/72)  BP(mean): --  RR: 18 (15 Sep 2020 18:35) (16 - 18)  SpO2: 96% (15 Sep 2020 18:35) (95% - 100%)    09-14 @ 07:01  -  09-15 @ 07:00  --------------------------------------------------------  IN: 100 mL / OUT: 600 mL / NET: -500 mL    09-15 @ 07:01  -  09-15 @ 19:46  --------------------------------------------------------  IN: 1800 mL / OUT: 1100 mL / NET: 700 mL          Review of Systems:   •	General: negative  •	Skin/Breast: negative  •	Ophthalmologic: negative  •	ENMT: negative  •	Respiratory and Thorax: negative  •	Cardiovascular: negative  •	Gastrointestinal: negative  •	Genitourinary: negative  •	Musculoskeletal: negative  •	Neurological: negative  •	Psychiatric: negative  •	Hematology/Lymphatics: negative  •	Endocrine: negative  •	Allergic/Immunologic: negative    Physical Exam:   • Constitutional:	Well-developed, well nourished  • Eyes:	EOMI; PERRL; no drainage or redness  • ENMT:	No oral lesions; no gross abnormalities  • Neck	No bruits; no thyromegaly or nodules  • Breasts:	not examined  • Back:	No deformity or limitation of movement  • Respiratory:	Breath Sounds equal & clear to percussion & auscultation, no accessory muscle use  • Cardiovascular:	Regular rate & rhythm, normal S1, S2; no murmurs, gallops or rubs; no S3, S4  • Gastrointestinal:	Soft, non-tender, no hepatosplenomegaly, normal bowel sounds  • Genitourinary:	not examined  • Rectal: not examined  • Extremities:	No cyanosis, clubbing or edema  • Vascular:	Equal and normal pulses (carotid, femoral, dorsalis pedis)  • Neurologica:l	not examined  • Skin:	No lesions; no rash  • Lymph Nodes:	No lymphadedenopathy  • Musculoskeletal:	No joint pain, swelling or deformity; no limitation of movement        LABS:      CBC Full  -  ( 15 Sep 2020 05:55 )  WBC Count : 9.43 K/uL  RBC Count : 4.31 M/uL  Hemoglobin : 8.3 g/dL  Hematocrit : 29.9 %  Platelet Count - Automated : 373 K/uL  Mean Cell Volume : 69.4 fl  Mean Cell Hemoglobin : 19.3 pg  Mean Cell Hemoglobin Concentration : 27.8 gm/dL  Auto Neutrophil # : 6.38 K/uL  Auto Lymphocyte # : 1.15 K/uL  Auto Monocyte # : 1.32 K/uL  Auto Eosinophil # : 0.45 K/uL  Auto Basophil # : 0.08 K/uL  Auto Neutrophil % : 67.7 %  Auto Lymphocyte % : 12.2 %  Auto Monocyte % : 14.0 %  Auto Eosinophil % : 4.8 %  Auto Basophil % : 0.8 %    09-15    134<L>  |  104  |  28<H>  ----------------------------<  105<H>  4.4   |  21<L>  |  0.95    Ca    8.7      15 Sep 2020 05:55                          RADIOLOGY & ADDITIONAL STUDIES (The following images were personally reviewed):  Loja:                                     No  Urine output:                       adequate  DVT prophylaxis:                 Yes  Flattus:                                  Yes  Bowel movement:              No

## 2020-09-15 NOTE — DISCHARGE NOTE PROVIDER - CARE PROVIDERS DIRECT ADDRESSES
,DirectAddress_Unknown,DirectAddress_Unknown,[email or web address] ,DirectAddress_Unknown,DirectAddress_Unknown,[email or web address] ,DirectAddress_Unknown,DirectAddress_Unknown,[email or web address],DirectAddress_Unknown,DirectAddress_Unknown ,DirectAddress_Unknown,DirectAddress_Unknown,[email or web address],DirectAddress_Unknown,DirectAddress_Unknown

## 2020-09-15 NOTE — DISCHARGE NOTE PROVIDER - NSDCFUADDAPPT_GEN_ALL_CORE_FT
Follow-up with orthopedics (Dr. Jose Castro) within 1 week of discharge.    Follow-up with Dr. Zahra Casillas with infectious diseases after discharge. She will be following your blood work and helping to plan your antibiotic regimen.    Follow- up with Dr. Rajinder Mckeon (plastic surgery) within 1 week of discharge for wound vac management.    Follow-up with Dr. Davalos (podiatry) outpatient, or a podiatrist of your choice for continued diabetic foot ulcer management.    Follow-up with Dr. Rubi (general surgery), or a general surgeon of your choice regarding your need for a cholecystectomy (gallbladder removal) in the near future. Report to ER for fever, abdominal pain, nausea/vomiting.    Please continue to follow-up with your primary care doctor, cardiologist Dr. Caldera, and vascular doctor Dr. Malloy for complete outpatient medical follow-up.    Follow-up with orthopedics (Dr. Jose Castro) within 1 week of discharge.    Follow-up with Dr. Zahra Casillas with infectious diseases after discharge. She will be following your blood work and helping to plan your antibiotic regimen.    Follow- up with Dr. Rajinder Mckeon (plastic surgery) within 1 week of discharge for wound vac management.    Follow-up with Dr. Davalos (podiatry) outpatient, or a podiatrist of your choice for continued diabetic foot ulcer management.    Follow-up with Dr. Latif (general surgery), or a general surgeon of your choice regarding your need for a cholecystectomy (gallbladder removal) in the near future. Report to ER for fever, abdominal pain, nausea/vomiting.    Please continue to follow-up with your primary care doctor, cardiologist Dr. Caldera, and vascular doctor Dr. Malloy for complete outpatient medical follow-up.    Follow-up with orthopedics (Dr. Jose Castro) within 1 week of discharge.    Follow-up with Dr. Zahra Casillas with infectious diseases after discharge. She will be following your blood work (CBC, CMP, ESR, CRP, CPK) and helping to plan your antibiotic regimen.    Follow- up with Dr. Rajinder Mckeon (plastic surgery) within 1 week of discharge for wound vac management.    Follow-up with Dr. Davalos (podiatry) outpatient, or a podiatrist of your choice for continued diabetic foot ulcer management.    Follow-up with Dr. Latif (general surgery), or a general surgeon of your choice regarding your need for a cholecystectomy (gallbladder removal) in the near future. Report to ER for fever, abdominal pain, nausea/vomiting.    Please continue to follow-up with your primary care doctor, cardiologist Dr. Caldera, and vascular doctor Dr. Malloy for complete outpatient medical follow-up.    Follow-up with orthopedics (Dr. Jose Castro) within 1 week of discharge.    Follow-up with Dr. Zahra Casillas with infectious diseases after discharge. She will be following your blood work (CBC, CMP, ESR, CRP, Vanc trough) and helping to plan your antibiotic regimen.    Follow- up with Dr. Rajinder Mckeon (plastic surgery) within 1 week of discharge for wound management.    Follow-up with Dr. Davalos (podiatry) outpatient, or a podiatrist of your choice for continued diabetic foot ulcer management.    Follow-up with Dr. Latif (general surgery), or a general surgeon of your choice regarding your need for a cholecystectomy (gallbladder removal) in the near future. Report to ER for fever, abdominal pain, nausea/vomiting.    Please continue to follow-up with your primary care doctor, cardiologist Dr. Caldera, and vascular doctor Dr. Malloy for complete outpatient medical follow-up.    Follow-up with orthopedics (Dr. oJse Castro) within 1 week of discharge.    Follow-up with Dr. Zahra Casillas with infectious diseases after discharge. She will be following your blood work (CBC, CMP, ESR, CRP, Vanc trough) and helping to plan your antibiotic regimen.    Follow- up with Dr. Rajinder Mckeon (plastic surgery) within 1 week of discharge for wound management. The drain output should be documented and the drain cannot be removed until the output is less than 20 cc's.    Follow-up with Dr. Davalos (podiatry) outpatient, or a podiatrist of your choice for continued diabetic foot ulcer management.    Follow-up with Dr. Latif (general surgery), or a general surgeon of your choice regarding your need for a cholecystectomy (gallbladder removal) in the near future. Report to ER for fever, abdominal pain, nausea/vomiting.    Please continue to follow-up with your primary care doctor, cardiologist Dr. Caldera, and vascular doctor Dr. Malloy for complete outpatient medical follow-up.    Follow-up with orthopedics (Dr. Jose Castro) within 1 week of discharge.    Follow-up with Dr. Zahra Casillas with infectious diseases after discharge. She will be following your blood work (CBC, CMP, ESR, CRP, Vanc trough) and helping to plan your antibiotic regimen.    Follow- up with Dr. Rajinder Mckeon (plastic surgery) within 1 week of discharge for wound management.     Follow-up with Dr. Davalos (podiatry) outpatient, or a podiatrist of your choice for continued diabetic foot ulcer management.    Follow-up with Dr. Latif (general surgery), or a general surgeon of your choice regarding your need for a cholecystectomy (gallbladder removal) in the near future. Report to ER for fever, abdominal pain, nausea/vomiting.    Please continue to follow-up with your primary care doctor, cardiologist Dr. Caldera, and vascular doctor Dr. Malloy for complete outpatient medical follow-up.

## 2020-09-15 NOTE — DISCHARGE NOTE PROVIDER - NSDCCPCAREPLAN_GEN_ALL_CORE_FT
PRINCIPAL DISCHARGE DIAGNOSIS  Diagnosis: Acute pain of right knee  Assessment and Plan of Treatment: Acute pain of right knee      SECONDARY DISCHARGE DIAGNOSES  Diagnosis: Leg wound, left  Assessment and Plan of Treatment: Leg wound, left     PRINCIPAL DISCHARGE DIAGNOSIS  Diagnosis: Acute pain of right knee  Assessment and Plan of Treatment: improving s/p revision surgery and with IV abx      SECONDARY DISCHARGE DIAGNOSES  Diagnosis: Adjustment disorder with mixed anxiety and depressed mood  Assessment and Plan of Treatment: improving with medication    Diagnosis: Leg wound, left  Assessment and Plan of Treatment: improving s/p skin graft

## 2020-09-15 NOTE — PROGRESS NOTE ADULT - SUBJECTIVE AND OBJECTIVE BOX
The patient had no acute issues during night, denies abdominal pain, nausea, vomiting, fever or chills.    Denies leak from RUQ wound.    Plastics/Ortho are taking him on Friday for a flap.    MEDICATIONS  (STANDING):  aspirin  chewable 81 milliGRAM(s) Oral two times a day  cefepime   IVPB      cefepime   IVPB 2000 milliGRAM(s) IV Intermittent every 8 hours  chlorhexidine 2% Cloths 1 Application(s) Topical <User Schedule>  DAPTOmycin IVPB 675 milliGRAM(s) IV Intermittent every 24 hours  dextrose 5%. 1000 milliLiter(s) (50 mL/Hr) IV Continuous <Continuous>  dextrose 50% Injectable 12.5 Gram(s) IV Push once  dextrose 50% Injectable 25 Gram(s) IV Push once  dextrose 50% Injectable 25 Gram(s) IV Push once  digoxin     Tablet 0.125 milliGRAM(s) Oral daily  gabapentin 100 milliGRAM(s) Oral <User Schedule>  gabapentin 200 milliGRAM(s) Oral at bedtime  insulin glargine Injectable (LANTUS) 10 Unit(s) SubCutaneous at bedtime  insulin lispro (HumaLOG) corrective regimen sliding scale   SubCutaneous Before meals and at bedtime  insulin lispro Injectable (HumaLOG) 8 Unit(s) SubCutaneous three times a day before meals  lactated ringers. 1000 milliLiter(s) (50 mL/Hr) IV Continuous <Continuous>  levothyroxine 50 MICROGram(s) Oral daily  lidocaine 2% Injectable 10 milliLiter(s) Local Injection once  metoprolol succinate ER 25 milliGRAM(s) Oral daily  nystatin Powder 1 Application(s) Topical two times a day  pantoprazole  Injectable 40 milliGRAM(s) IV Push daily  polyethylene glycol 3350 17 Gram(s) Oral daily  prasugrel 10 milliGRAM(s) Oral daily  rifAMPin 300 milliGRAM(s) Oral every 12 hours  rosuvastatin 10 milliGRAM(s) Oral at bedtime  sertraline 25 milliGRAM(s) Oral daily  spironolactone 25 milliGRAM(s) Oral daily  tamsulosin 0.4 milliGRAM(s) Oral at bedtime    MEDICATIONS  (PRN):  acetaminophen   Tablet .. 650 milliGRAM(s) Oral every 6 hours PRN Temp greater or equal to 38C (100.4F), Mild Pain (1 - 3)  aluminum hydroxide/magnesium hydroxide/simethicone Suspension 30 milliLiter(s) Oral four times a day PRN Indigestion  bisacodyl Suppository 10 milliGRAM(s) Rectal daily PRN If no bowel movement by POD#2  cyclobenzaprine 5 milliGRAM(s) Oral three times a day PRN Muscle Spasm  dextrose 40% Gel 15 Gram(s) Oral once PRN Blood Glucose LESS THAN 70 milliGRAM(s)/deciliter  diphenhydrAMINE   Injectable 25 milliGRAM(s) IV Push every 6 hours PRN Rash and/or Itching  glucagon  Injectable 1 milliGRAM(s) IntraMuscular once PRN Glucose LESS THAN 70 milligrams/deciliter  metoclopramide Injectable 10 milliGRAM(s) IV Push every 6 hours PRN breakthrough nausea  morphine  - Injectable 2 milliGRAM(s) IV Push every 4 hours PRN breakthrough pain  morphine  IR 15 milliGRAM(s) Oral every 4 hours PRN Moderate Pain (4 - 6)  morphine  IR 30 milliGRAM(s) Oral every 4 hours PRN Severe Pain (7 - 10)  ondansetron Injectable 4 milliGRAM(s) IV Push every 6 hours PRN Nausea and/or Vomiting  senna 2 Tablet(s) Oral at bedtime PRN Constipation      I&O's Detail    14 Sep 2020 07:01  -  15 Sep 2020 07:00  --------------------------------------------------------  IN:    IV PiggyBack: 50 mL    IV PiggyBack: 50 mL  Total IN: 100 mL    OUT:    Voided (mL): 600 mL  Total OUT: 600 mL    Total NET: -500 mL      15 Sep 2020 07:01  -  15 Sep 2020 17:13  --------------------------------------------------------  IN:  Total IN: 0 mL    OUT:    Voided (mL): 600 mL  Total OUT: 600 mL    Total NET: -600 mL      Vital Signs Last 24 Hrs  T(C): 36.6 (15 Sep 2020 12:43), Max: 37.3 (14 Sep 2020 17:32)  T(F): 97.8 (15 Sep 2020 12:43), Max: 99.1 (14 Sep 2020 17:32)  HR: 74 (15 Sep 2020 12:43) (74 - 84)  BP: 99/57 (15 Sep 2020 12:43) (99/57 - 115/72)  BP(mean): --  RR: 17 (15 Sep 2020 12:43) (16 - 18)  SpO2: 99% (15 Sep 2020 12:43) (95% - 100%)    Physical Exam:  General: NAD  Pulmonary: Nonlabored breathing, no respiratory distress  Abdominal: soft, NT/ND, small open wound about 1 cm in RUQ with minimal discharge, no rebound tenderness, guarding, rigidity  Extremities: WWP, SCDs in place                            8.3    9.43  )-----------( 373      ( 15 Sep 2020 05:55 )             29.9   09-15    134<L>  |  104  |  28<H>  ----------------------------<  105<H>  4.4   |  21<L>  |  0.95    Ca    8.7      15 Sep 2020 05:55

## 2020-09-15 NOTE — DISCHARGE NOTE PROVIDER - NSDCFUADDINST_GEN_ALL_CORE_FT
Weight bear as tolerated with assistive device.  No strenuous activity, heavy lifting, driving or returning to work until cleared by MD.  You may shower - dressing is water-resistant, no soaking in bathtubs.  Remove dressing after post op day 5-7, then leave incision open to air. Keep incision clean and dry.  Try to have regular bowel movements, take stool softener or laxative if necessary.    Swelling may travel all the way down leg to foot, this is normal and will subside in a few weeks.  Call to schedule an appt with Dr. Castro for follow up, if you have staples or sutures they will be removed in office.  Contact your doctor if you experience: fever greater than 101.5, chills, chest pain, difficulty breathing, redness or excessive drainage around the incision, other concerns.  Follow up with your primary care provider.   Touch- toe weight bearing on right side with right leg in knee immobilizer at all times. Weight- bearing as tolerated on left- leg.  No strenuous activity, heavy lifting, driving or returning to work until cleared by MD.  You may shower - dressing is water-resistant, no soaking in bathtubs.  You have wound vac dressings. Follow-up with Plastic Surgery (Dr. Rajinder Mckeon).    Try to have regular bowel movements, take stool softener or laxative if necessary.    Swelling may travel all the way down leg to foot, this is normal and will subside in a few weeks.  Call to schedule an appt with Dr. Castro for follow up, if you have staples or sutures they will be removed in office.  Contact your doctor if you experience: fever greater than 101.5, chills, chest pain, difficulty breathing, redness or excessive drainage around the incision, other concerns.  Follow up with your primary care provider.     Touch- toe weight bearing on right side with right leg in knee immobilizer at all times. Weight- bearing as tolerated on left- leg.  No strenuous activity, heavy lifting, driving or returning to work until cleared by MD.  You may shower - dressing is water-resistant, no soaking in bathtubs.  You have wound vac dressings. Follow-up with Plastic Surgery (Dr. Rajinder Mckeon).    ESR, CRP, CBC with diff, CMP, CPK should be drawn weekly and results faxed to Infectious Disease Dr. Casillas [phone]    Try to have regular bowel movements, take stool softener or laxative if necessary.    Swelling may travel all the way down leg to foot, this is normal and will subside in a few weeks.  Call to schedule an appt with Dr. Castro for follow up, if you have staples or sutures they will be removed in office.  Contact your doctor if you experience: fever greater than 101.5, chills, chest pain, difficulty breathing, redness or excessive drainage around the incision, other concerns.  Follow up with your primary care provider.   Touch- toe weight bearing on right side with right leg in knee immobilizer at all times. Weight- bearing as tolerated on left- leg.  No strenuous activity, heavy lifting, driving or returning to work until cleared by MD.  You may shower - dressing is water-resistant, no soaking in bathtubs.      ESR, CRP, CBC with diff, CMP, CPK should be drawn weekly and results faxed to Infectious Disease Dr. Casillas [phone]    Try to have regular bowel movements, take stool softener or laxative if necessary.    Swelling may travel all the way down leg to foot, this is normal and will subside in a few weeks.  Call to schedule an appt with Dr. Castro for follow up, if you have staples or sutures they will be removed in office.  Contact your doctor if you experience: fever greater than 101.5, chills, chest pain, difficulty breathing, redness or excessive drainage around the incision, other concerns.  Follow up with your primary care provider.   Touch- toe weight bearing on right side with right leg in knee immobilizer at all times. Weight- bearing as tolerated on left leg.  No strenuous activity, heavy lifting, driving or returning to work until cleared by MD.    ESR, CRP, CBC with diff, CMP, Vanc trough should be drawn weekly and results faxed to Infectious Disease Dr. Casillas [phone]    Your left leg dressing should be changed daily with bacitracin, adaptec, ABD pads and kerlix gauze. Your right knee should be wrapped gently with an ace wrap at all times.    Try to have regular bowel movements, take stool softener or laxative if necessary.    Swelling may travel all the way down leg to foot, this is normal and will subside in a few weeks.  Call to schedule an appt with Dr. Castro for follow up, if you have staples or sutures they will be removed in office.    Contact your doctor if you experience: fever greater than 101.5, chills, chest pain, difficulty breathing, redness or excessive drainage around the incision, other concerns.  Follow up with your primary care provider.   Touch- toe weight bearing on right side with right leg in knee immobilizer at all times. Weight- bearing as tolerated on left leg.  No strenuous activity, heavy lifting, driving or returning to work until cleared by MD.    ESR, CRP, CBC with diff, CMP, Vanc trough should be drawn weekly and results faxed to Infectious Disease Dr. Casillas [phone]    Your left leg dressing should be changed daily with bacitracin, adaptic, ABD pads and kerlix gauze. Your right knee should be wrapped gently with an ace wrap at all times.    Try to have regular bowel movements, take stool softener or laxative if necessary.    Swelling may travel all the way down leg to foot, this is normal and will subside in a few weeks.  Call to schedule an appt with Dr. Castro for follow up, if you have staples or sutures they will be removed in office.    Contact your doctor if you experience: fever greater than 101.5, chills, chest pain, difficulty breathing, redness or excessive drainage around the incision, other concerns.  Follow up with your primary care provider.

## 2020-09-15 NOTE — DISCHARGE NOTE PROVIDER - NSDCCPTREATMENT_GEN_ALL_CORE_FT
PRINCIPAL PROCEDURE  Procedure: Insertion, antibiotic spacer  Findings and Treatment: exchange of antibiotic spacer in R knee      SECONDARY PROCEDURE  Procedure: Revision of both components of total hip arthroplasty  Findings and Treatment: R hip exchange of hardware, open biopsy    Procedure: Closure, wound, using gastrocnemius flap and skin graft application  Findings and Treatment: R Knee    Procedure: Application of split-thickness skin graft (STSG) to left lower extremity  Findings and Treatment: LLE vascular wound

## 2020-09-15 NOTE — PROGRESS NOTE ADULT - ASSESSMENT
64 yo M w/ PMH of CAD s/p CABG, CHF, DM, HLD, HTN, acute cholecystitis s/p perc sudhir 2/28, recent R TKA 7/22 presents with R knee infection. General surgery consulted for purulent drainage near prior perc sudhir tube site.     POD 11 s/p bedside I&D at perc sudhir site (9/4) with improvement in erythema, induration and purulent expression. WBCs wnl, afebrile,  Cx from RUQ drainage showed Pseudomonas.     HIDA scan was not able to visualize the gall bladder, patient was not able to tolerate delayed imaging, possible false positive finding  Gallium study didn't confirm the presence of a tract between GB and SQ collection    - The discharge is minimal and becoming smaller, and the cavity is closing gradually, WBC WNL, no fever    Discussed with Dr. Latif and Chief Resident:    - Continue  daily dressing with Iodoform pack and cover the gauze and Tegaderm, this can be done by nursing, I discussed with Godfrey the nurse manager and he said nurses can do the dressing.  - Follow up with Dr Tima Latif  as outpatient for possible lap sudhir, as it will be needed at some point  - Surgery now will sign off    Thank you for the opportunity to participate in the care of this patient

## 2020-09-15 NOTE — DISCHARGE NOTE PROVIDER - NSDCMRMEDTOKEN_GEN_ALL_CORE_FT
Abx - : Vancomycin 750mg every 12 hours. Last day of administration: 09/02/2020 Dx: MRSA infection right knee  acetaminophen 325 mg oral tablet: 3 tab(s) orally every 8 hours  aspirin 81 mg oral delayed release tablet: 1 tab(s) orally 2 times a day -for DVT prophylaxis THEN can restart home regimen for heart    atorvastatin 40 mg oral tablet: 1 tab(s) orally once a day (at bedtime)  CBC, CMP, ESR, CRP, VANCO TROPH: 1x/ week for 6 weeks    start date: Monday 7/27/20    fax results to Dr. Casillas ([facility]):  [phone]  CeleBREX 200 mg oral capsule: 1 cap(s) orally 2 times a day   digoxin 125 mcg (0.125 mg) oral tablet: 1 tab(s) orally once a day  DULoxetine 30 mg oral delayed release capsule: 3 cap(s) orally once a day  heparin 3mL flush from 5mL syringe: administer after each infusion  Labs - : CBC,CMP,ESR,CRP,Vanco trough once per week x 6 weeks. Start date: 08/10/2020 Send results to [facility] Dr. Casillas at fax: [phone]  metoprolol succinate 25 mg oral tablet, extended release: 1 tab(s) orally once a day  NS FLUSH 10 mL: administer before and after each infusion  oxyCODONE 10 mg oral tablet: 1 tab(s) orally every 4 to 6 hours, As Needed -Severe Pain (7 - 10) MDD:5  OxyCONTIN 10 mg oral tablet, extended release: 1 tab(s) orally every 12 hours MDD:2  pantoprazole 40 mg oral delayed release tablet: 1 tab(s) orally once a day, take 30 minutes before breakfast  prasugrel 10 mg oral tablet: 1 tab(s) orally once a day  rifAMPin 300 mg oral capsule: 1 cap(s) orally every 12 hours MDD:2 PLEASE TAKE FOR TOTAL OF 6 WEEKS FROM SURGERY on 7/22   Silvadene 1% topical cream: 1 application topically 2 times a day to affected area Right knee   spironolactone 25 mg oral tablet: 1 tab(s) orally once a day  tamsulosin 0.4 mg oral capsule: 1 cap(s) orally once a day (at bedtime)  vancomycin 1g in NS 250ml every 12 hours : Last date of administration:     diagnosis: T84.53XA   acetaminophen 325 mg oral tablet: 3 tab(s) orally every 8 hours  aspirin 81 mg oral delayed release tablet: 1 tab(s) orally 2 times a day  atorvastatin 40 mg oral tablet: 1 tab(s) orally once a day (at bedtime)  SHIVANI BRACE: SHIVANI BRACE LOCKED AT 15 DEG  CeleBREX 200 mg oral capsule: 1 cap(s) orally 2 times a day   cyclobenzaprine 10 mg oral tablet: 1 tab(s) orally 3 times a day, As needed, Muscle Spasm  digoxin 125 mcg (0.125 mg) oral tablet: 1 tab(s) orally once a day  DULoxetine 30 mg oral delayed release capsule: 3 cap(s) orally once a day  gabapentin 300 mg oral capsule: 1 cap(s) orally 3 times a day  metoprolol succinate 25 mg oral tablet, extended release: 1 tab(s) orally once a day  oxyCODONE 10 mg oral tablet: 1 tab(s) orally every 4 to 6 hours, As Needed -Severe Pain (7 - 10) MDD:5  OxyCONTIN 10 mg oral tablet, extended release: 1 tab(s) orally every 12 hours MDD:2  pantoprazole 40 mg oral delayed release tablet: 1 tab(s) orally once a day, take 30 minutes before breakfast  polyethylene glycol 3350 oral powder for reconstitution: 17 gram(s) orally once a day as needed for constipation  prasugrel 10 mg oral tablet: 1 tab(s) orally once a day  rifAMPin 300 mg oral capsule: 1 cap(s) orally every 12 hours - tentative end date of 10/29/2020  senna oral tablet: 2 tab(s) orally once a day (at bedtime), As needed, Constipation  sertraline 25 mg oral tablet: 1 tab(s) orally once a day  Silvadene 1% topical cream: 1 application topically 2 times a day to affected area Right knee   spironolactone 25 mg oral tablet: 1 tab(s) orally once a day  tamsulosin 0.4 mg oral capsule: 1 cap(s) orally once a day (at bedtime)  vancomycin 1.25 g intravenous injection: 1.25 gram(s) intravenous every 24 hours - tentative end date of 10/29/2020   aspirin 81 mg oral delayed release tablet: 1 tab(s) orally 2 times a day  atorvastatin 40 mg oral tablet: 1 tab(s) orally once a day (at bedtime)  SHIVANI BRACE: SHIVANI BRACE LOCKED AT 15 DEG  cyclobenzaprine 10 mg oral tablet: 1 tab(s) orally 3 times a day, As needed, Muscle Spasm  digoxin 125 mcg (0.125 mg) oral tablet: 1 tab(s) orally once a day  DULoxetine 30 mg oral delayed release capsule: 3 cap(s) orally once a day  gabapentin 300 mg oral capsule: 1 cap(s) orally 3 times a day  metoprolol succinate 25 mg oral tablet, extended release: 1 tab(s) orally once a day  morphine 15 mg oral tablet: 1 tab(s) orally every 4 hours, As needed, Moderate Pain (4 - 6)  morphine 15 mg/8 to 12 hr oral tablet, extended release: 1 tab(s) orally once a day  morphine 30 mg oral tablet: 1 tab(s) orally every 4 hours, As needed, Severe Pain (7 - 10)  polyethylene glycol 3350 oral powder for reconstitution: 17 gram(s) orally once a day as needed for constipation  prasugrel 10 mg oral tablet: 1 tab(s) orally once a day  rifAMPin 300 mg oral capsule: 1 cap(s) orally every 12 hours - tentative end date of 10/29/2020  senna oral tablet: 2 tab(s) orally once a day (at bedtime), As needed, Constipation  sertraline 25 mg oral tablet: 1 tab(s) orally once a day  spironolactone 25 mg oral tablet: 1 tab(s) orally once a day  tamsulosin 0.4 mg oral capsule: 1 cap(s) orally once a day (at bedtime)  vancomycin 1.25 g intravenous injection: 1.25 gram(s) intravenous every 24 hours - tentative end date of 10/29/2020   aspirin 81 mg oral delayed release tablet: 1 tab(s) orally 2 times a day  atorvastatin 40 mg oral tablet: 1 tab(s) orally once a day (at bedtime)  SHIVANI BRACE: SHIVANI BRACE LOCKED AT 15 DEG  cyclobenzaprine 10 mg oral tablet: 1 tab(s) orally 3 times a day, As needed, Muscle Spasm  digoxin 125 mcg (0.125 mg) oral tablet: 1 tab(s) orally once a day  DULoxetine 30 mg oral delayed release capsule: 3 cap(s) orally once a day  gabapentin 300 mg oral capsule: 1 cap(s) orally 3 times a day  insulin glargine: 20 unit(s) subcutaneous once a day (at bedtime)  insulin lispro (concentrated) 200 units/mL subcutaneous solution:  Insulin lispro sliding scale with every meal and at bedtime:   2 Unit(s) if Glucose 151 - 200  4 Unit(s) if Glucose 201 - 250  6 Unit(s) if Glucose 251 - 300  8 Unit(s) if Glucose 301 - 350  10 Unit(s) if Glucose 351 - 400  12 Unit(s) if Glucose Greater Than 400    metoprolol succinate 25 mg oral tablet, extended release: 1 tab(s) orally once a day  morphine 15 mg oral tablet: 1 tab(s) orally every 4 hours, As needed, Moderate Pain (4 - 6)  morphine 15 mg/8 to 12 hr oral tablet, extended release: 1 tab(s) orally once a day  morphine 30 mg oral tablet: 1 tab(s) orally every 4 hours, As needed, Severe Pain (7 - 10)  polyethylene glycol 3350 oral powder for reconstitution: 17 gram(s) orally once a day as needed for constipation  prasugrel 10 mg oral tablet: 1 tab(s) orally once a day  rifAMPin 300 mg oral capsule: 1 cap(s) orally every 12 hours - tentative end date of 10/29/2020  senna oral tablet: 2 tab(s) orally once a day (at bedtime), As needed, Constipation  sertraline 25 mg oral tablet: 1 tab(s) orally once a day  spironolactone 25 mg oral tablet: 1 tab(s) orally once a day  tamsulosin 0.4 mg oral capsule: 1 cap(s) orally once a day (at bedtime)  vancomycin 1.25 g intravenous injection: 1.25 gram(s) intravenous every 24 hours - tentative end date of 10/29/2020   aspirin 81 mg oral delayed release tablet: 1 tab(s) orally 2 times a day  atorvastatin 40 mg oral tablet: 1 tab(s) orally once a day (at bedtime)  SHIVANI BRACE: SHIVANI BRACE LOCKED AT 15 DEG  cyclobenzaprine 10 mg oral tablet: 1 tab(s) orally 3 times a day, As needed, Muscle Spasm  digoxin 125 mcg (0.125 mg) oral tablet: 1 tab(s) orally once a day  DULoxetine 30 mg oral delayed release capsule: 3 cap(s) orally once a day  furosemide 20 mg oral tablet: 1 tab(s) orally once a day as needed for orthopnea  gabapentin 300 mg oral capsule: 1 cap(s) orally 3 times a day  insulin glargine: 20 unit(s) subcutaneous once a day (at bedtime)  insulin lispro (concentrated) 200 units/mL subcutaneous solution:  Insulin lispro sliding scale with every meal and at bedtime:   2 Unit(s) if Glucose 151 - 200  4 Unit(s) if Glucose 201 - 250  6 Unit(s) if Glucose 251 - 300  8 Unit(s) if Glucose 301 - 350  10 Unit(s) if Glucose 351 - 400  12 Unit(s) if Glucose Greater Than 400    metoprolol succinate 25 mg oral tablet, extended release: 1 tab(s) orally once a day  morphine 15 mg oral tablet: 1 tab(s) orally every 4 hours, As needed, Moderate Pain (4 - 6)  morphine 15 mg/8 to 12 hr oral tablet, extended release: 1 tab(s) orally once a day  morphine 30 mg oral tablet: 1 tab(s) orally every 4 hours, As needed, Severe Pain (7 - 10)  polyethylene glycol 3350 oral powder for reconstitution: 17 gram(s) orally once a day as needed for constipation  prasugrel 10 mg oral tablet: 1 tab(s) orally once a day  rifAMPin 300 mg oral capsule: 1 cap(s) orally every 12 hours - tentative end date of 10/29/2020  senna oral tablet: 2 tab(s) orally once a day (at bedtime), As needed, Constipation  sertraline 25 mg oral tablet: 1 tab(s) orally once a day  spironolactone 25 mg oral tablet: 1 tab(s) orally once a day  tamsulosin 0.4 mg oral capsule: 1 cap(s) orally once a day (at bedtime)  vancomycin 1.25 g intravenous injection: 1.25 gram(s) intravenous every 24 hours - tentative end date of 10/29/2020

## 2020-09-15 NOTE — PROGRESS NOTE ADULT - ATTENDING COMMENTS
Patient seen and examined with house-staff during bedside rounds.  Resident note read, including vitals, physical findings, laboratory data, and radiological reports.   Revisions included below.  Direct personal management at bed side and extensive interpretation of the data.  Plan was outlined and discussed in details with the housestaff.  Decision making of high complexity  Action taken for acute disease activity to reflect the level of care provided:  - medication reconciliation  - review laboratory data  he is stable  BS is better controlled but might need to decraese lantus

## 2020-09-15 NOTE — PROGRESS NOTE ADULT - SUBJECTIVE AND OBJECTIVE BOX
Ortho Note    Pt comfortable without complaints, pain controlled  Denies CP, SOB, N/V, numbness/tingling     Vital Signs Last 24 Hrs  T(C): 36.6 (09-15-20 @ 12:43), Max: 36.6 (09-15-20 @ 12:43)  T(F): 97.8 (09-15-20 @ 12:43), Max: 97.8 (09-15-20 @ 12:43)  HR: 74 (09-15-20 @ 12:43) (74 - 82)  BP: 99/57 (09-15-20 @ 12:43) (99/57 - 109/66)  BP(mean): --  RR: 17 (09-15-20 @ 12:43) (16 - 17)  SpO2: 99% (09-15-20 @ 12:43) (96% - 99%)  AVSS    General: Pt Alert and oriented, NAD    RLE:  DSG- R hip gauze/ teg CDI; R knee gauze/ teg CDI  Pulses: +DP;  feet cool to touch; at baseline; no edema  Sensation: SILT to baseline (diminished sensation- h/o severe neuropathy; follows with vascular Dr. Malloy outpatient)  Motor: 5/5 EHL/FHL/TA/GS    LLE- open healing vascular wound; wound bed pink; no erythema, odor, or purulent drainage ; covered with WTD dsg  Pulses: feet cool to touch; at baseline; no edema  Sensation: SILT to baseline (diminished sensation- h/o severe neuropathy; follows with vascular Dr. Malloy outpatient)  Motor: 5/5 EHL/FHL/TA/GS                        8.3    9.43  )-----------( 373      ( 15 Sep 2020 05:55 )             29.9   15 Sep 2020 05:55    134    |  104    |  28     ----------------------------<  105    4.4     |  21     |  0.95             A/P: 65yMale admitted for R knee pain, drainage s/p right knee arthroscopic I+D 7/17, right knee explant and abx spacer 7/22 with Dr. Castro;  and debridement of LLE wound with wound vac placement on 7/30 by Dr. Bowen. Was original d/c'd home on 8/10/20 with PICC and vancomycin IV q12h + rifampin.  Readmitted, and now s/p R hip exchange of hardware on 09/04/2020  - H+H 8.3/29.9 - as per Dr. Castro to receive 1 unit PRBCs today; patient will go to OR for R knee spacer exchange + gastroc flap with Plastics Dr. Bran; goal H+H prior to OR 10.0/30.0 for anticipated post-op drop.  - Pain Control- appreciate pain management recs  - DVT ppx: ASA + effient (home med)  - PT, WBS: WBAT  - OOB for meals, aggressive I/S  - continue bowel regimen  - appreciate ID recs- Continue dapto, cefepime, rifampin; check CPK 9/21 with dapto; f/o CBC with diff and CMP (elevated liver enzymes in past with rifampin)  - appreciate internal medicine, and cardiology recs (outpatient cardiologist Dr. Caldera to see patient this week prior to next procedure Friday 9/18)  - appreciate plastics recs- continue WTD dressings on LLE vascular wound qd and PRN  - appreciate gen surg recs- will need cholecystectomy (elective) in near future, currently no abd pain, n/v  - appreciate psych recs- currently feels well on regimen today, will reconsult as needed  - dispo: OR Friday for R knee spacer exchange and gastroc flap    Ortho Pager [phone]

## 2020-09-15 NOTE — DISCHARGE NOTE PROVIDER - PROVIDER TOKENS
PROVIDER:[TOKEN:[[id number]],FOLLOWUP:[1 week]],PROVIDER:[TOKEN:[[id number]],FOLLOWUP:[1 week]],PROVIDER:[TOKEN:[[id number]],FOLLOWUP:[Routine]],PROVIDER:[TOKEN:[[id number]],FOLLOWUP:[Routine]] PROVIDER:[TOKEN:[[id number]],FOLLOWUP:[1 week]],PROVIDER:[TOKEN:[[id number]],FOLLOWUP:[1 week]],PROVIDER:[TOKEN:[[id number]],FOLLOWUP:[Routine]],PROVIDER:[TOKEN:[[id number]],FOLLOWUP:[Routine]],PROVIDER:[TOKEN:[[id number]],FOLLOWUP:[2 weeks]] PROVIDER:[TOKEN:[[id number]],FOLLOWUP:[1 week]],PROVIDER:[TOKEN:[[id number]],FOLLOWUP:[1 week]],PROVIDER:[TOKEN:[[id number]],FOLLOWUP:[Routine]],PROVIDER:[TOKEN:[[id number]],FOLLOWUP:[Routine]],PROVIDER:[TOKEN:[[id number]],FOLLOWUP:[2 weeks]],PROVIDER:[TOKEN:[[id number]],FOLLOWUP:[2 weeks]],PROVIDER:[TOKEN:[[id number]],FOLLOWUP:[Routine]] PROVIDER:[TOKEN:[[id number]],FOLLOWUP:[1 week]],PROVIDER:[TOKEN:[[id number]],FOLLOWUP:[1 week]],PROVIDER:[TOKEN:[[id number]],FOLLOWUP:[Routine]],PROVIDER:[TOKEN:[[id number]],FOLLOWUP:[Routine]],PROVIDER:[TOKEN:[[id number]],FOLLOWUP:[2 weeks]],PROVIDER:[TOKEN:[[id number]],FOLLOWUP:[2 weeks]],PROVIDER:[TOKEN:[[id number]],FOLLOWUP:[Routine]] PROVIDER:[TOKEN:[[id number]],FOLLOWUP:[1 week]],PROVIDER:[TOKEN:[[id number]],FOLLOWUP:[1 week]],PROVIDER:[TOKEN:[[id number]],FOLLOWUP:[Routine]],PROVIDER:[TOKEN:[[id number]],FOLLOWUP:[2 weeks]],PROVIDER:[TOKEN:[[id number]],FOLLOWUP:[2 weeks]],PROVIDER:[TOKEN:[[id number]],FOLLOWUP:[2 weeks]]

## 2020-09-15 NOTE — PROGRESS NOTE ADULT - SUBJECTIVE AND OBJECTIVE BOX
Ortho Note    Pt comfortable without complaints, pain controlled. Reports 1x episode of NBNB emesis last night due to nausea. Has since resolved  Denies CP, SOB, N/V, numbness/tingling     Vital Signs Last 24 Hrs  T(C): 36.7 (15 Sep 2020 05:52), Max: 37.3 (14 Sep 2020 17:32)  T(F): 98.1 (15 Sep 2020 05:52), Max: 99.1 (14 Sep 2020 17:32)  HR: 84 (15 Sep 2020 05:52) (78 - 87)  BP: 108/68 (15 Sep 2020 05:52) (108/68 - 116/72)  BP(mean): --  RR: 18 (15 Sep 2020 05:52) (17 - 18)  SpO2: 95% (15 Sep 2020 05:52) (95% - 100%)    General: Pt Alert and oriented, NAD  DSG C/D/I on RLE knee and over leg of LLE  Pulses: cap refill < 2 secs on RLE  Sensation: dimished sensation in non-dermatomal pattern over RLE  Motor: firing  EHL/FHL/TA/GS                          7.5    9.47  )-----------( 360      ( 13 Sep 2020 07:59 )             27.3   13 Sep 2020 07:59    134    |  104    |  27     ----------------------------<  121    4.4     |  21     |  0.95     Ca    8.5        13 Sep 2020 07:59          A/P: 65yMale s/p R knee antibiotic spacer 07/22 who presented with R knee swelling and RUQ wound drainage on 09/01/2020 and R hip exchange of hardware on 09/04/2020    1. Pain control as needed. Appreciate pain management recs.   2. DVT prophylaxis: ASA, Effient   3. PT, weight-bearing status: WBAT BLE   4. Appreciate Gen Surg recs for RUQ wound. Pt feels that wound is improving. HIDA scan inconclusive. Gallium scan does not show a tract. No surgery right now per Gen Surg.  5. Continue antibiotics. Appreciate ID recs.   6. Left leg wound managed by Dr. Mckeon PRS, appreciate recs  7. Continue sertraline and gabapentin per psych. Appreciate recs.  8. Dispo: OR Friday for R knee spacer exchange and gastroc flap with plastics    Ortho Pager [phone]

## 2020-09-15 NOTE — PROGRESS NOTE ADULT - ATTENDING COMMENTS
Seen by me this afternoon. Better spirits than last week. Pain continues to improve. He has been able to walk with the walker for longer distances, though he does have soreness at the end of the day.     We discussed next stage of surgical planning. Given the persistently elevated inflammatory markers with open wound at the knee, will plan for return to OR this Friday for right knee spacer to spacer exchange, medial gastroc flap coverage, and left leg wound debridement with Dr. Mckeon. Likely 6 weeks additional IV antibiotics following procedure, followed by 3-6 months PO while continuing to follow markers and clinical progress.

## 2020-09-15 NOTE — DISCHARGE NOTE PROVIDER - HOSPITAL COURSE
Admitted 9/1/20 with increased E knee pain and swelling; and RUQ drain site drainage.    RUQ drain site abcess- General surgery consulted. RUQ cultures + for pseudomonos. Ultrasound/ Hida scan and gallium scan done. Patient will require an elective cholecystectomy in the near future. To follow-up with general surgery.    Internal medicine consult for pre-op orthopedic clearance and co-management.  Cardiology (Dr. Caldera) consulted for pre-op op clearance and continued cardiology recs  Plastic Surgery consulted for LLE and RLE wound management  Podiatry consulted for R foot diabetic ulcers.  Psych consulted for feelings of depression- zoloft started  Pain management consulted- recommended morphine PO  Vascular consulted (Dr. Malloy)- continue to follow outpatient  ID consulted- on daptomycin 675mg q24h, cefepime 2g q8h, and rifampin 300mg q12h; follow-up CBC with diff, CMP, CPK, ESR, CRP    R hip exchange of hardware 9/4/20    DVT prophylaxis- effient + ASA  OOB/Physical Therapy   Admitted 9/1/20 with increased E knee pain and swelling; and drainage from past RUQ drain site     RUQ drain site abcess- General surgery consulted. RUQ cultures + for pseudomonos. Ultrasound/ Hida scan and gallium scan done. Patient will require an elective cholecystectomy in the near future. To follow-up with general surgery (Dr. Rubi)    Internal medicine consult (Dr. Kwok) for pre-op orthopedic clearance and co-management.  Cardiology (Dr. Caldera) consulted for pre-op op clearance and continued cardiology recs  Plastic Surgery (Dr. Mckeon) consulted for LLE and RLE wound management. Dr Rajinder Mckeon performed gastrocnemius flap on R knee incision during surgery on 9/18/20  Psych consulted for feelings of depression- Zoloft 25mg qd started  Pain management consulted- continued adjustments made throughout hospital course;  recommended morphine PO  Vascular consulted (Dr. Malloy)- Continue to follow outpatient  ID consulted (Dr. Casillas)- on daptomycin 675mg q24h, cefepime 2g q8h, and rifampin 300mg q12h; follow-up CBC with diff, CMP, CPK, ESR, CRP  Podiatry Consult- R diabetic foot ulcers, debrided on 9/16, bactroban + dressing applied    R hip exchange of hardware 9/4/20  DVT prophylaxis- effient + ASA  OOB/Physical Therapy   Admitted 9/1/20 with increased E knee pain and swelling; and drainage from past RUQ drain site     RUQ drain site abcess- General surgery consulted. RUQ cultures + for pseudomonos. Ultrasound/ Hida scan and gallium scan done. Patient will require an elective cholecystectomy in the near future. To follow-up with general surgery (Dr. Rubi)    Internal medicine consult (Dr. Kwok) for pre-op orthopedic clearance and co-management.  Cardiology (Dr. Caldera) consulted for pre-op op clearance and continued cardiology recs  Plastic Surgery (Dr. Mckeon) consulted for LLE and RLE wound management. Dr Rajinder Mckeon performed gastrocnemius flap on R knee incision during surgery on 9/18/20  Psych consulted for feelings of depression- Zoloft 25mg qd started  Pain management consulted- continued adjustments made throughout hospital course;  recommended morphine PO  Vascular consulted (Dr. Malloy)- Continue to follow outpatient  ID consulted (Dr. Casillas)- on daptomycin 675mg q24h, cefepime 2g q8h, and rifampin 300mg q12h; follow-up CBC with diff, CMP, CPK, ESR, CRP  Podiatry Consult- R diabetic foot ulcers, debrided on 9/16, bactroban + dressing applied; dressing changes done by podiatry  Protein Calorie Malnutrition- Nutrition services consulted      R hip exchange of hardware 9/4/20  R Knee Revision Antibiotic Spacer by Dr. Castro, R Knee medial gastroc flap and LLE STSG by Dr. Mckeon on 9/18  DVT prophylaxis- effient + ASA 81mg bid  OOB/Physical Therapy   Admitted 9/1/20 with increased E knee pain and swelling; and drainage from past RUQ drain site     RUQ drain site abcess- General surgery consulted on admission. RUQ cultures taken and ID following. Ultrasound/ Hida scan and gallium scan done. I+D was performed on 9/4/20. The incision stopped draining for a few days, but then the wound began draining thicker, yellow fluid. A repeat ultrasound was done on 9/22which was inconclusive, and then a repeat CT of the abdomen was done on 9/23/20. Patient will require an elective cholecystectomy in the near future. To follow-up with general surgery. (Dr. Latif)    RUE swelling- A doppler was performed on 9/23/20 to rule out RUE DVT.    Internal medicine consulted (Dr. Kwok) for pre-op orthopedic clearance and hospital co-management.  Cardiology (Dr. Caldera) consulted for pre-op op clearance and continued cardiology recs  Plastic Surgery (Dr. Mkceon) consulted for LLE and RLE wound management. Dr Rajinder Mckeon performed gastrocnemius flap on R knee incision during surgery on 9/18/20. A skin graft was also placed over LLE donor site. Wound vacs were on in the immediate post-op period for wound healing.  Psych consulted for feelings of depression- Zoloft 25mg qd started  Pain management consulted- continued adjustments made throughout hospital course;  recommended morphine PO, gabapenting 300mg three times a day.   Vascular consulted (Dr. Malloy)- Continue to follow outpatient  ID consulted (Dr. Casillas)- on daptomycin 675mg q24h, cefepime 2g q8h, and rifampin 300mg q12h; follow-up CBC with diff, CMP, CPK, ESR, CRP  Podiatry Consult- diabetic foot ulcers, debrided on 9/16 and 9/22 bactroban + dressing applied; dressing changes done by podiatry  Protein Calorie Malnutrition- Nutrition services consulted      R hip exchange of hardware 9/4/20  R Knee Revision Antibiotic Spacer by Dr. Castro, R Knee medial gastroc flap and LLE STSG by Dr. Mckeon on 9/18  DVT prophylaxis- effient + ASA 81mg bid  OOB/Physical Therapy   Admitted 9/1/20 with increased E knee pain and swelling; and drainage from past RUQ drain site     RUQ drain site abcess- General surgery consulted on admission. RUQ cultures taken and ID following. Ultrasound/ Hida scan and gallium scan done. I+D was performed on 9/4/20. The incision stopped draining for a few days, but then the wound began draining thicker, yellow fluid. A repeat ultrasound was done on 9/22 which was inconclusive, and then a repeat CT of the abdomen was done on 9/23/20. Patient will require an elective cholecystectomy in the near future. To follow-up with general surgery. (Dr. Latif)    RUE swelling- A doppler was performed on 9/23/20 to rule out RUE DVT and was negative.     Internal medicine consulted (Dr. Kwok) for pre-op orthopedic clearance and hospital co-management.  Cardiology (Dr. Caldera) consulted for pre-op op clearance and continued cardiology recs  Plastic Surgery (Dr. Mckeon) consulted for LLE and RLE wound management. Dr Rajinder Mckeon performed gastrocnemius flap on R knee incision during surgery on 9/18/20. A skin graft was also placed over LLE donor site. Wound vacs were on in the immediate post-op period for wound healing.  Psych consulted for feelings of depression- Zoloft 25mg qd started  Pain management consulted- continued adjustments made throughout hospital course;  recommended morphine PO, gabapenting 300mg three times a day.   Vascular consulted (Dr. Malloy)- Continue to follow outpatient  ID consulted (Dr. Casillas)- on daptomycin 675mg q24h, cefepime 2g q8h, and rifampin 300mg q12h; follow-up CBC with diff, CMP, CPK, ESR, CRP  Podiatry Consult- diabetic foot ulcers, debrided on 9/16 and 9/22 bactroban + dressing applied; dressing changes done by podiatry  Protein Calorie Malnutrition- Nutrition services consulted      R hip exchange of hardware 9/4/20  R Knee Revision Antibiotic Spacer by Dr. Castro, R Knee medial gastroc flap and LLE STSG by Dr. Mckeon on 9/18  DVT prophylaxis- effient + ASA 81mg bid  OOB/Physical Therapy   Admitted 9/1/20 with increased R knee pain and swelling; and drainage from past RUQ drain site     RUQ drain site abcess- General surgery consulted on admission. RUQ cultures taken and ID following. Ultrasound/ Hida scan and gallium scan done. I+D was performed on 9/4/20. The incision stopped draining for a few days, but then the wound began draining thicker, yellow fluid. A repeat ultrasound was done on 9/22 which was inconclusive, and then a repeat CT of the abdomen was done on 9/23/20. Patient will require an elective cholecystectomy in the near future. To follow-up with general surgery. (Dr. Latif)    RUE swelling- A doppler was performed on 9/23/20 to rule out RUE DVT and was negative.     Internal medicine consulted (Dr. Kwok) for pre-op orthopedic clearance and hospital co-management.    Cardiology (Dr. Caldera) consulted for pre-op op clearance and continued cardiology recs    Plastic Surgery (Dr. Mckeon) consulted for LLE and RLE wound management. Dr Rajinder Mckeon performed gastrocnemius flap on R knee incision during surgery on 9/18/20. A skin graft was also placed over LLE donor site. Wound vacs were on in the immediate post-op period for wound healing. This was removed and patient now has a dressing covering the left leg wound. This should be covered with bacitracin, adaptec, ABD pads and kerlix gauze and should be changed daily. The right knee should be gently wrapped with an ace wrap at all times.     Psych consulted for feelings of depression- Zoloft 25mg qd started    Pain management consulted- continued adjustments made throughout hospital course;  recommended morphine PO, gabapentin 300mg three times a day, gabapentin and MSContin.     Vascular consulted (Dr. Malloy)- Continue to follow outpatient    ID consulted (Dr. Casillas)- on vancomycin 1.25g IV q24h with tentative end date of 10/29, and rifampin 300mg q12h with tentative end date of 10/29. Please get CBC with diff, CMP, CRP, ESR, Vancomycin trough weekly and fax to Dr. Bradshaw's office at [phone].     Podiatry Consult- diabetic foot ulcers, debrided on 9/16 and 9/22 bactroban + dressing applied; dressing changes done by podiatry    Nutrition services consulted due to protein calorie malnutrition    Surgical procedures during this admission include:  R hip exchange of hardware 9/4/20  R Knee Revision Antibiotic Spacer by Dr. Castro, R Knee medial gastroc flap and LLE STSG by Dr. Mckeon on 9/18

## 2020-09-16 LAB
ALBUMIN SERPL ELPH-MCNC: 3 G/DL — LOW (ref 3.3–5)
ALP SERPL-CCNC: 140 U/L — HIGH (ref 40–120)
ALT FLD-CCNC: 13 U/L — SIGNIFICANT CHANGE UP (ref 10–45)
ANION GAP SERPL CALC-SCNC: 10 MMOL/L — SIGNIFICANT CHANGE UP (ref 5–17)
AST SERPL-CCNC: 15 U/L — SIGNIFICANT CHANGE UP (ref 10–40)
BASOPHILS # BLD AUTO: 0.06 K/UL — SIGNIFICANT CHANGE UP (ref 0–0.2)
BASOPHILS NFR BLD AUTO: 0.8 % — SIGNIFICANT CHANGE UP (ref 0–2)
BILIRUB SERPL-MCNC: 0.3 MG/DL — SIGNIFICANT CHANGE UP (ref 0.2–1.2)
BLD GP AB SCN SERPL QL: NEGATIVE — SIGNIFICANT CHANGE UP
BUN SERPL-MCNC: 29 MG/DL — HIGH (ref 7–23)
CALCIUM SERPL-MCNC: 8.7 MG/DL — SIGNIFICANT CHANGE UP (ref 8.4–10.5)
CHLORIDE SERPL-SCNC: 104 MMOL/L — SIGNIFICANT CHANGE UP (ref 96–108)
CO2 SERPL-SCNC: 20 MMOL/L — LOW (ref 22–31)
CREAT SERPL-MCNC: 0.9 MG/DL — SIGNIFICANT CHANGE UP (ref 0.5–1.3)
CRP SERPL-MCNC: 2.5 MG/DL — HIGH (ref 0–0.4)
EOSINOPHIL # BLD AUTO: 0.4 K/UL — SIGNIFICANT CHANGE UP (ref 0–0.5)
EOSINOPHIL NFR BLD AUTO: 5.1 % — SIGNIFICANT CHANGE UP (ref 0–6)
ERYTHROCYTE [SEDIMENTATION RATE] IN BLOOD: 22 MM/HR — HIGH
GLUCOSE BLDC GLUCOMTR-MCNC: 110 MG/DL — HIGH (ref 70–99)
GLUCOSE BLDC GLUCOMTR-MCNC: 112 MG/DL — HIGH (ref 70–99)
GLUCOSE BLDC GLUCOMTR-MCNC: 139 MG/DL — HIGH (ref 70–99)
GLUCOSE BLDC GLUCOMTR-MCNC: 159 MG/DL — HIGH (ref 70–99)
GLUCOSE BLDC GLUCOMTR-MCNC: 180 MG/DL — HIGH (ref 70–99)
GLUCOSE SERPL-MCNC: 192 MG/DL — HIGH (ref 70–99)
HCT VFR BLD CALC: 30.4 % — LOW (ref 39–50)
HGB BLD-MCNC: 8.5 G/DL — LOW (ref 13–17)
IMM GRANULOCYTES NFR BLD AUTO: 0.3 % — SIGNIFICANT CHANGE UP (ref 0–1.5)
LYMPHOCYTES # BLD AUTO: 1.21 K/UL — SIGNIFICANT CHANGE UP (ref 1–3.3)
LYMPHOCYTES # BLD AUTO: 15.5 % — SIGNIFICANT CHANGE UP (ref 13–44)
MCHC RBC-ENTMCNC: 19.7 PG — LOW (ref 27–34)
MCHC RBC-ENTMCNC: 28 GM/DL — LOW (ref 32–36)
MCV RBC AUTO: 70.5 FL — LOW (ref 80–100)
MONOCYTES # BLD AUTO: 0.98 K/UL — HIGH (ref 0–0.9)
MONOCYTES NFR BLD AUTO: 12.5 % — SIGNIFICANT CHANGE UP (ref 2–14)
NEUTROPHILS # BLD AUTO: 5.15 K/UL — SIGNIFICANT CHANGE UP (ref 1.8–7.4)
NEUTROPHILS NFR BLD AUTO: 65.8 % — SIGNIFICANT CHANGE UP (ref 43–77)
NRBC # BLD: 0 /100 WBCS — SIGNIFICANT CHANGE UP (ref 0–0)
PLATELET # BLD AUTO: 355 K/UL — SIGNIFICANT CHANGE UP (ref 150–400)
POTASSIUM SERPL-MCNC: 4.4 MMOL/L — SIGNIFICANT CHANGE UP (ref 3.5–5.3)
POTASSIUM SERPL-SCNC: 4.4 MMOL/L — SIGNIFICANT CHANGE UP (ref 3.5–5.3)
PROT SERPL-MCNC: 6.5 G/DL — SIGNIFICANT CHANGE UP (ref 6–8.3)
RBC # BLD: 4.31 M/UL — SIGNIFICANT CHANGE UP (ref 4.2–5.8)
RBC # FLD: 19.2 % — HIGH (ref 10.3–14.5)
RH IG SCN BLD-IMP: POSITIVE — SIGNIFICANT CHANGE UP
SODIUM SERPL-SCNC: 134 MMOL/L — LOW (ref 135–145)
WBC # BLD: 7.82 K/UL — SIGNIFICANT CHANGE UP (ref 3.8–10.5)
WBC # FLD AUTO: 7.82 K/UL — SIGNIFICANT CHANGE UP (ref 3.8–10.5)

## 2020-09-16 PROCEDURE — 99232 SBSQ HOSP IP/OBS MODERATE 35: CPT

## 2020-09-16 PROCEDURE — 73620 X-RAY EXAM OF FOOT: CPT | Mod: 26,50

## 2020-09-16 PROCEDURE — 99232 SBSQ HOSP IP/OBS MODERATE 35: CPT | Mod: GC

## 2020-09-16 RX ORDER — MUPIROCIN 20 MG/G
1 OINTMENT TOPICAL ONCE
Refills: 0 | Status: COMPLETED | OUTPATIENT
Start: 2020-09-16 | End: 2020-09-16

## 2020-09-16 RX ORDER — BACITRACIN ZINC 500 UNIT/G
1 OINTMENT IN PACKET (EA) TOPICAL
Refills: 0 | Status: DISCONTINUED | OUTPATIENT
Start: 2020-09-16 | End: 2020-09-18

## 2020-09-16 RX ADMIN — Medication 50 MICROGRAM(S): at 05:35

## 2020-09-16 RX ADMIN — PANTOPRAZOLE SODIUM 40 MILLIGRAM(S): 20 TABLET, DELAYED RELEASE ORAL at 11:33

## 2020-09-16 RX ADMIN — INSULIN GLARGINE 10 UNIT(S): 100 INJECTION, SOLUTION SUBCUTANEOUS at 22:14

## 2020-09-16 RX ADMIN — Medication 25 MILLIGRAM(S): at 15:49

## 2020-09-16 RX ADMIN — SERTRALINE 25 MILLIGRAM(S): 25 TABLET, FILM COATED ORAL at 11:32

## 2020-09-16 RX ADMIN — Medication 25 MILLIGRAM(S): at 08:06

## 2020-09-16 RX ADMIN — GABAPENTIN 100 MILLIGRAM(S): 400 CAPSULE ORAL at 14:53

## 2020-09-16 RX ADMIN — Medication 81 MILLIGRAM(S): at 17:56

## 2020-09-16 RX ADMIN — Medication 8 UNIT(S): at 18:09

## 2020-09-16 RX ADMIN — Medication 25 MILLIGRAM(S): at 05:35

## 2020-09-16 RX ADMIN — GABAPENTIN 100 MILLIGRAM(S): 400 CAPSULE ORAL at 05:35

## 2020-09-16 RX ADMIN — Medication 8 UNIT(S): at 08:41

## 2020-09-16 RX ADMIN — Medication 2: at 07:25

## 2020-09-16 RX ADMIN — GABAPENTIN 200 MILLIGRAM(S): 400 CAPSULE ORAL at 22:15

## 2020-09-16 RX ADMIN — CEFEPIME 100 MILLIGRAM(S): 1 INJECTION, POWDER, FOR SOLUTION INTRAMUSCULAR; INTRAVENOUS at 07:26

## 2020-09-16 RX ADMIN — Medication 81 MILLIGRAM(S): at 05:35

## 2020-09-16 RX ADMIN — SPIRONOLACTONE 25 MILLIGRAM(S): 25 TABLET, FILM COATED ORAL at 05:35

## 2020-09-16 RX ADMIN — PRASUGREL 10 MILLIGRAM(S): 5 TABLET, FILM COATED ORAL at 11:33

## 2020-09-16 RX ADMIN — Medication 0.12 MILLIGRAM(S): at 05:35

## 2020-09-16 RX ADMIN — ROSUVASTATIN CALCIUM 10 MILLIGRAM(S): 5 TABLET ORAL at 22:14

## 2020-09-16 RX ADMIN — Medication 1 APPLICATION(S): at 17:56

## 2020-09-16 RX ADMIN — CEFEPIME 100 MILLIGRAM(S): 1 INJECTION, POWDER, FOR SOLUTION INTRAMUSCULAR; INTRAVENOUS at 00:13

## 2020-09-16 RX ADMIN — MUPIROCIN 1 APPLICATION(S): 20 OINTMENT TOPICAL at 11:00

## 2020-09-16 RX ADMIN — CEFEPIME 100 MILLIGRAM(S): 1 INJECTION, POWDER, FOR SOLUTION INTRAMUSCULAR; INTRAVENOUS at 15:40

## 2020-09-16 RX ADMIN — DAPTOMYCIN 127 MILLIGRAM(S): 500 INJECTION, POWDER, LYOPHILIZED, FOR SOLUTION INTRAVENOUS at 01:01

## 2020-09-16 RX ADMIN — Medication 25 MILLIGRAM(S): at 22:21

## 2020-09-16 RX ADMIN — TAMSULOSIN HYDROCHLORIDE 0.4 MILLIGRAM(S): 0.4 CAPSULE ORAL at 22:15

## 2020-09-16 NOTE — PROGRESS NOTE ADULT - SUBJECTIVE AND OBJECTIVE BOX
Ortho Note    Pt comfortable without complaints, pain controlled  Denies CP, SOB, N/V, numbness/tingling     Vital Signs Last 24 Hrs  T(C): 36.7 (09-16-20 @ 12:20), Max: 36.7 (09-16-20 @ 12:20)  T(F): 98 (09-16-20 @ 12:20), Max: 98 (09-16-20 @ 12:20)  HR: 80 (09-16-20 @ 12:20) (80 - 86)  BP: 118/59 (09-16-20 @ 12:20) (117/69 - 118/59)  BP(mean): --  RR: 16 (09-16-20 @ 12:20) (16 - 17)  SpO2: 99% (09-16-20 @ 12:20) (99% - 99%)  AVSS    RLE:  DSG- R hip gauze/ teg CDI; R knee gauze/ teg CDI; kerlix around recently debrided R foot (diabetic ulcers)  Pulses: +DP;  feet cool to touch; at baseline; no edema  Sensation: Sensation intact to baseline (diminished sensation- h/o severe neuropathy; follows with vascular Dr. Malloy outpatient)  Motor: 5/5 EHL/FHL/TA/GS    LLE- open healing vascular wound; wound bed pink; no erythema, odor, or purulent drainage ; covered with WTD dsg  Pulses: feet cool to touch; at baseline; no edema  Sensation: SILT to baseline (diminished sensation- h/o severe neuropathy; follows with vascular Dr. Malloy outpatient)  Motor: 5/5 EHL/FHL/TA/GS                        8.5    7.82  )-----------( 355      ( 16 Sep 2020 07:42 )             30.4   16 Sep 2020 07:42    134    |  104    |  29     ----------------------------<  192    4.4     |  20     |  0.90       TPro  6.5    /  Alb  3.0    /  TBili  0.3    /  DBili  x      /  AST  15     /  ALT  13     /  AlkPhos  140    16 Sep 2020 07:42    A/P: 65yMale admitted for R knee pain, drainage s/p right knee arthroscopic I+D 7/17, right knee explant and abx spacer 7/22 with Dr. Castro;  and debridement of LLE wound with wound vac placement on 7/30 by Dr. Bowen. Was original d/c'd home on 8/10/20 with PICC and vancomycin IV q12h + rifampin.  Readmitted, and now s/p R hip exchange of hardware on 09/04/2020  - H+H 8.5/ 30.4 - blood bank would not release unit of blood late yesterday afternoon since surgery not immediate and scheduled for Friday. Approved unit for this AM. Patient will go to OR for R knee spacer exchange + gastroc flap with Plastics Dr. Bran; goal H+H prior to OR 10.0/30.0 for anticipated post-op drop.  - Pain Control- appreciate pain management recs  - DVT ppx: ASA + effient (home med)  - PT, WBS: WBAT  - OOB for meals, aggressive I/S  - continue bowel regimen  - appreciate ID recs- Continue dapto, cefepime, rifampin; check CPK 9/21 with dapto; f/o CBC with diff and CMP (elevated liver enzymes in past with rifampin)  - appreciate internal medicine, and cardiology recs (seen by Dr. Caldera today- plan to continue home meds)  - appreciate plastics recs- continue WTD dressings on LLE vascular wound qd and PRN  - appreciate gen surg recs- will need cholecystectomy (elective) in near future, currently no abd pain, n/v; dressing changes as per nursing; no acute drainage from RUQ site  - appreciate psych recs- currently feels well on regimen today, will reconsult as needed  - podiatry consulted for R foot diabetic ulcers x2- debrided at bedside; bactroban applied and dsg changes as per podiatry  - dispo: OR Friday for R knee spacer exchange and gastroc flap    Ortho Pager [phone]    Ortho Pager [phone]

## 2020-09-16 NOTE — PROGRESS NOTE ADULT - SUBJECTIVE AND OBJECTIVE BOX
INTERVAL HPI/OVERNIGHT EVENTS: ANAHI. Groin itching improved with nystatin. Continues to have weeping from L shin ulcer.    CONSTITUTIONAL:  Negative fever or chills, feels well, good appetite  EYES:  Negative  blurry vision or double vision  CARDIOVASCULAR:  Negative for chest pain or palpitations  RESPIRATORY:  Negative for cough, wheezing, or SOB   GASTROINTESTINAL:  Negative for nausea, vomiting, diarrhea, constipation, or abdominal pain  GENITOURINARY:  Negative frequency, urgency or dysuria  NEUROLOGIC:  No headache, confusion, dizziness, lightheadedness      ANTIBIOTICS/RELEVANT:    MEDICATIONS  (STANDING):  aspirin  chewable 81 milliGRAM(s) Oral two times a day  cefepime   IVPB      cefepime   IVPB 2000 milliGRAM(s) IV Intermittent every 8 hours  chlorhexidine 2% Cloths 1 Application(s) Topical <User Schedule>  DAPTOmycin IVPB 675 milliGRAM(s) IV Intermittent every 24 hours  dextrose 5%. 1000 milliLiter(s) (50 mL/Hr) IV Continuous <Continuous>  dextrose 50% Injectable 12.5 Gram(s) IV Push once  dextrose 50% Injectable 25 Gram(s) IV Push once  dextrose 50% Injectable 25 Gram(s) IV Push once  digoxin     Tablet 0.125 milliGRAM(s) Oral daily  gabapentin 100 milliGRAM(s) Oral <User Schedule>  gabapentin 200 milliGRAM(s) Oral at bedtime  insulin glargine Injectable (LANTUS) 10 Unit(s) SubCutaneous at bedtime  insulin lispro (HumaLOG) corrective regimen sliding scale   SubCutaneous Before meals and at bedtime  insulin lispro Injectable (HumaLOG) 8 Unit(s) SubCutaneous three times a day before meals  lactated ringers. 1000 milliLiter(s) (50 mL/Hr) IV Continuous <Continuous>  levothyroxine 50 MICROGram(s) Oral daily  lidocaine 2% Injectable 10 milliLiter(s) Local Injection once  metoprolol succinate ER 25 milliGRAM(s) Oral daily  nystatin Powder 1 Application(s) Topical two times a day  pantoprazole  Injectable 40 milliGRAM(s) IV Push daily  polyethylene glycol 3350 17 Gram(s) Oral daily  prasugrel 10 milliGRAM(s) Oral daily  rifAMPin 300 milliGRAM(s) Oral every 12 hours  rosuvastatin 10 milliGRAM(s) Oral at bedtime  sertraline 25 milliGRAM(s) Oral daily  spironolactone 25 milliGRAM(s) Oral daily  tamsulosin 0.4 milliGRAM(s) Oral at bedtime    MEDICATIONS  (PRN):  acetaminophen   Tablet .. 650 milliGRAM(s) Oral every 6 hours PRN Temp greater or equal to 38C (100.4F), Mild Pain (1 - 3)  aluminum hydroxide/magnesium hydroxide/simethicone Suspension 30 milliLiter(s) Oral four times a day PRN Indigestion  bisacodyl Suppository 10 milliGRAM(s) Rectal daily PRN If no bowel movement by POD#2  cyclobenzaprine 5 milliGRAM(s) Oral three times a day PRN Muscle Spasm  dextrose 40% Gel 15 Gram(s) Oral once PRN Blood Glucose LESS THAN 70 milliGRAM(s)/deciliter  diphenhydrAMINE   Injectable 25 milliGRAM(s) IV Push every 6 hours PRN Rash and/or Itching  glucagon  Injectable 1 milliGRAM(s) IntraMuscular once PRN Glucose LESS THAN 70 milligrams/deciliter  metoclopramide Injectable 10 milliGRAM(s) IV Push every 6 hours PRN breakthrough nausea  morphine  - Injectable 2 milliGRAM(s) IV Push every 4 hours PRN breakthrough pain  morphine  IR 15 milliGRAM(s) Oral every 4 hours PRN Moderate Pain (4 - 6)  morphine  IR 30 milliGRAM(s) Oral every 4 hours PRN Severe Pain (7 - 10)  ondansetron Injectable 4 milliGRAM(s) IV Push every 6 hours PRN Nausea and/or Vomiting  senna 2 Tablet(s) Oral at bedtime PRN Constipation        Vital Signs Last 24 Hrs  T(C): 36.7 (16 Sep 2020 12:20), Max: 36.7 (15 Sep 2020 21:33)  T(F): 98 (16 Sep 2020 12:20), Max: 98.1 (15 Sep 2020 21:33)  HR: 80 (16 Sep 2020 12:20) (80 - 91)  BP: 118/59 (16 Sep 2020 12:20) (108/68 - 118/59)  BP(mean): --  RR: 16 (16 Sep 2020 12:20) (16 - 18)  SpO2: 99% (16 Sep 2020 12:20) (96% - 99%)    PHYSICAL EXAM:  Constitutional:Well-developed, well nourished  Eyes:DAMARIS, EOMI  Ear/Nose/Throat: no oral lesion, no sinus tenderness on percussion	  Neck:no JVD, no lymphadenopathy, supple  Respiratory: CTA colton  Cardiovascular: S1S2 RRR, no murmurs  Gastrointestinal:soft, (+) BS, no HSM  Extremities:no e/e/c  Vascular: DP Pulse:	right normal; left normal      LABS:                        8.5    7.82  )-----------( 355      ( 16 Sep 2020 07:42 )             30.4     09-16    134<L>  |  104  |  29<H>  ----------------------------<  192<H>  4.4   |  20<L>  |  0.90    Ca    8.7      16 Sep 2020 07:42    TPro  6.5  /  Alb  3.0<L>  /  TBili  0.3  /  DBili  x   /  AST  15  /  ALT  13  /  AlkPhos  140<H>  09-16          MICROBIOLOGY: reviewed    RADIOLOGY & ADDITIONAL STUDIES: reviewed

## 2020-09-16 NOTE — PROGRESS NOTE ADULT - SUBJECTIVE AND OBJECTIVE BOX
Ortho Note    Pt comfortable without complaints, pain controlled  Denies CP, SOB, N/V, numbness/tingling     Vital Signs Last 24 Hrs  T(C): 36.7 (16 Sep 2020 00:28), Max: 36.7 (15 Sep 2020 05:52)  T(F): 98 (16 Sep 2020 00:28), Max: 98.1 (15 Sep 2020 05:52)  HR: 91 (16 Sep 2020 02:48) (74 - 91)  BP: 115/66 (16 Sep 2020 02:48) (99/57 - 115/66)  BP(mean): --  RR: 18 (16 Sep 2020 00:28) (16 - 18)  SpO2: 96% (16 Sep 2020 00:28) (95% - 99%)    General: Pt Alert and oriented, NAD    RLE:  DSG- R hip gauze/ teg CDI; R knee gauze/ teg CDI  Pulses: +DP;  feet cool to touch; at baseline; no edema  Sensation: SILT to baseline (diminished sensation- h/o severe neuropathy; follows with vascular Dr. Malloy outpatient)  Motor: 5/5 EHL/FHL/TA/GS    LLE- open healing vascular wound; wound bed pink; no erythema, odor, or purulent drainage ; covered with WTD dsg  Pulses: feet cool to touch; at baseline; no edema  Sensation: SILT to baseline (diminished sensation- h/o severe neuropathy; follows with vascular Dr. Malloy outpatient)  Motor: 5/5 EHL/FHL/TA/GS             A/P: 65yMale admitted for R knee pain, drainage s/p right knee arthroscopic I+D 7/17, right knee explant and abx spacer 7/22 with Dr. Castro;  and debridement of LLE wound with wound vac placement on 7/30 by Dr. Bowen. Was original d/c'd home on 8/10/20 with PICC and vancomycin IV q12h + rifampin.  Readmitted, and now s/p R hip exchange of hardware on 09/04/2020  - s/p 1 unit PRBCs yesterday; patient will go to OR for R knee spacer exchange + gastroc flap with Plastics Dr. Bran; goal H+H prior to OR 10.0/30.0 for anticipated post-op drop.  - Pain Control- appreciate pain management recs  - DVT ppx: ASA + effient (home med)  - PT, WBS: WBAT  - OOB for meals, aggressive I/S  - continue bowel regimen  - appreciate ID recs- Continue dapto, cefepime, rifampin; check CPK 9/21 with dapto; f/o CBC with diff and CMP (elevated liver enzymes in past with rifampin)  - appreciate internal medicine, and cardiology recs (outpatient cardiologist Dr. Caldera to see patient this week prior to next procedure Friday 9/18)  - appreciate plastics recs- continue WTD dressings on LLE vascular wound qd and PRN  - appreciate gen surg recs- will need cholecystectomy (elective) in near future, currently no abd pain, n/v  - appreciate psych recs- currently feels well on regimen today, will reconsult as needed  - dispo: OR Friday for R knee spacer exchange and gastroc flap    Ortho Pager [phone] Ortho Note    Pt comfortable without complaints, pain controlled  Denies CP, SOB, N/V, numbness/tingling     Vital Signs Last 24 Hrs  T(C): 36.7 (16 Sep 2020 00:28), Max: 36.7 (15 Sep 2020 05:52)  T(F): 98 (16 Sep 2020 00:28), Max: 98.1 (15 Sep 2020 05:52)  HR: 91 (16 Sep 2020 02:48) (74 - 91)  BP: 115/66 (16 Sep 2020 02:48) (99/57 - 115/66)  BP(mean): --  RR: 18 (16 Sep 2020 00:28) (16 - 18)  SpO2: 96% (16 Sep 2020 00:28) (95% - 99%)    General: Pt Alert and oriented, NAD    RLE:  DSG- R hip gauze/ teg CDI; R knee gauze/ teg CDI  Pulses: +DP;  feet cool to touch; at baseline; no edema  Sensation: SILT to baseline (diminished sensation- h/o severe neuropathy; follows with vascular Dr. Malloy outpatient)  Motor: 5/5 EHL/FHL/TA/GS    LLE- open healing vascular wound; wound bed pink; no erythema, odor, or purulent drainage ; covered with WTD dsg  Pulses: feet cool to touch; at baseline; no edema  Sensation: SILT to baseline (diminished sensation- h/o severe neuropathy; follows with vascular Dr. Malloy outpatient)  Motor: 5/5 EHL/FHL/TA/GS             A/P: 65yMale admitted for R knee pain, drainage s/p right knee arthroscopic I+D 7/17, right knee explant and abx spacer 7/22 with Dr. Castro;  and debridement of LLE wound with wound vac placement on 7/30 by Dr. Bowen. Was original d/c'd home on 8/10/20 with PICC and vancomycin IV q12h + rifampin.  Readmitted, and now s/p R hip exchange of hardware on 09/04/2020  - Will receive 1uPRBC today; patient will go to OR for R knee spacer exchange + gastroc flap with Plastics Dr. Bran; goal H+H prior to OR 10.0/30.0 for anticipated post-op drop.  - Pain Control- appreciate pain management recs  - DVT ppx: ASA + effient (home med)  - PT, WBS: WBAT  - OOB for meals, aggressive I/S  - continue bowel regimen  - appreciate ID recs- Continue dapto, cefepime, rifampin; check CPK 9/21 with dapto; f/o CBC with diff and CMP (elevated liver enzymes in past with rifampin)  - appreciate internal medicine, and cardiology recs (outpatient cardiologist Dr. Caldera to see patient this week prior to next procedure Friday 9/18)  - appreciate plastics recs- continue WTD dressings on LLE vascular wound qd and PRN  - appreciate gen surg recs- will need cholecystectomy (elective) in near future, currently no abd pain, n/v  - appreciate psych recs- currently feels well on regimen today, will reconsult as needed  - dispo: OR Friday for R knee spacer exchange and gastroc flap    Ortho Pager [phone]

## 2020-09-16 NOTE — PROGRESS NOTE ADULT - SUBJECTIVE AND OBJECTIVE BOX
INTERVAL HISTORY:  Ambulated residential down [place] w/o dyspnea.	  Chart, PMH medications and allergies reviewed    MEDICATIONS:  MEDICATIONS  (STANDING):  aspirin  chewable 81 milliGRAM(s) Oral two times a day  cefepime   IVPB      cefepime   IVPB 2000 milliGRAM(s) IV Intermittent every 8 hours  chlorhexidine 2% Cloths 1 Application(s) Topical <User Schedule>  DAPTOmycin IVPB 675 milliGRAM(s) IV Intermittent every 24 hours  dextrose 5%. 1000 milliLiter(s) (50 mL/Hr) IV Continuous <Continuous>  dextrose 50% Injectable 12.5 Gram(s) IV Push once  dextrose 50% Injectable 25 Gram(s) IV Push once  dextrose 50% Injectable 25 Gram(s) IV Push once  digoxin     Tablet 0.125 milliGRAM(s) Oral daily  gabapentin 100 milliGRAM(s) Oral <User Schedule>  gabapentin 200 milliGRAM(s) Oral at bedtime  insulin glargine Injectable (LANTUS) 10 Unit(s) SubCutaneous at bedtime  insulin lispro (HumaLOG) corrective regimen sliding scale   SubCutaneous Before meals and at bedtime  insulin lispro Injectable (HumaLOG) 8 Unit(s) SubCutaneous three times a day before meals  lactated ringers. 1000 milliLiter(s) (50 mL/Hr) IV Continuous <Continuous>  levothyroxine 50 MICROGram(s) Oral daily  lidocaine 2% Injectable 10 milliLiter(s) Local Injection once  metoprolol succinate ER 25 milliGRAM(s) Oral daily  nystatin Powder 1 Application(s) Topical two times a day  pantoprazole  Injectable 40 milliGRAM(s) IV Push daily  polyethylene glycol 3350 17 Gram(s) Oral daily  prasugrel 10 milliGRAM(s) Oral daily  rifAMPin 300 milliGRAM(s) Oral every 12 hours  rosuvastatin 10 milliGRAM(s) Oral at bedtime  sertraline 25 milliGRAM(s) Oral daily  spironolactone 25 milliGRAM(s) Oral daily  tamsulosin 0.4 milliGRAM(s) Oral at bedtime      REVIEW OF SYSTEMS:  CONSTITUTIONAL: No fever, no weight loss, no fatigue  PULMONARY: No cough, no wheezing, chills no hemoptysis; No Shortness of Breath  CARDIOVASCULAR: No chest pain, no palpitations, no syncope, dizziness, no leg swelling  GASTROINTESTINAL: No abdominal pain. No nausea, no  vomiting, no hematemesis; No diarrhea, no constipation. No melena, no hematochezia.  GENITOURINARY: No dysuria, no frequency, no hematuria, no incontinence  SKIN: No itching, no burning, no rashes, no lesions     PHYSICAL EXAM:  T(C): 36.7 (09-16-20 @ 12:20), Max: 36.7 (09-15-20 @ 21:33)  HR: 80 (09-16-20 @ 12:20) (80 - 91)  BP: 118/59 (09-16-20 @ 12:20) (108/68 - 118/59)  RR: 16 (09-16-20 @ 12:20) (16 - 18)  SpO2: 99% (09-16-20 @ 12:20) (96% - 99%)  Wt(kg): --  I&O's Summary    15 Sep 2020 07:01  -  16 Sep 2020 07:00  --------------------------------------------------------  IN: 1950 mL / OUT: 1700 mL / NET: 250 mL    16 Sep 2020 07:01  -  16 Sep 2020 13:38  --------------------------------------------------------  IN: 975 mL / OUT: 0 mL / NET: 975 mL        Appearance:  No deformities, normal appearance	  HEENT:   Normal oral mucosa, PERRL, EOMI	  Neck: No jvd , no masses  Lymphatic: No  lymphadenopathy  Pulmonary: Lungs localized wheeze  Cardiovascular: Normal S1 S2, No JVD, No murmur, No edema	  Abdomen:  Soft, Non-tender, + BS  Skin: No rashes, No ecchymoses	  Extremities:  No clubbing or cyanosis  MSK: Normal range of motion, +joint swelling    LABS:		  CARDIAC MARKERS:                         8.5    7.82  )-----------( 355      ( 16 Sep 2020 07:42 )             30.4   09-16    134<L>  |  104  |  29<H>  ----------------------------<  192<H>  4.4   |  20<L>  |  0.90    Ca    8.7      16 Sep 2020 07:42    TPro  6.5  /  Alb  3.0<L>  /  TBili  0.3  /  DBili  x   /  AST  15  /  ALT  13  /  AlkPhos  140<H>  09-16    proBNP:  Lipid Profile:  HgA1c:  TSH:      TELEMETRY: 4 beat VT	      QTC     ECG:  	   QTC         RADIOLOGY:   DIAGNOSTIC TESTING: [ ] Echocardiogram: [ ]  Catheterization: [ ] Stress Test:      ASSESSMENT/PLAN: 	  Severely reduced ejection fraction-The patient is ambulating without dyspnea.  Continue metoprolol digoxin and spironolactone.    Coronary artery disease-the patient denies chest discomfort. His EKG is uninterpretable secondary to the pacemaker. He was revascularized less than two years ago. There is no clinical evidence for ischemia. On rosuvastatin. The patient is history of stent thrombosis. He had a stent placed less than two years ago. continue prasugrel and aspirin. Plan transfuse to hemoglobin of 10.     Defibrillator-no recent significant events. If no arrhythmia postoperatively, can discontinue monitor.     Septic knee- as per ID and orthopedics. Patient is high risk but optimized for surgery.       Discussed with house staff.

## 2020-09-16 NOTE — PROGRESS NOTE ADULT - SUBJECTIVE AND OBJECTIVE BOX
Interval Events: Reviewed  Patient seen and examined at bedside.    Patient is a 65y old  Male who presents with a chief complaint of R Knee Swelling (16 Sep 2020 05:49)    he is doing well and no draingae from the abdominal track  PAST MEDICAL & SURGICAL HISTORY:  Diabetic neuropathy    STEMI (ST elevation myocardial infarction)    Diverticulitis    MRSA bacteremia    History of celiac disease    CHF (congestive heart failure)  EF ~ 25%    HTN (hypertension)    Diabetes  on insulin pump    Blood clot due to device, implant, or graft  was on blood thinners    HLD (hyperlipidemia)    Osteoarthritis    Atherosclerosis of coronary artery  CAD (coronary artery disease)    Status post percutaneous transluminal coronary angioplasty  in 2012    History of open reduction and internal fixation (ORIF) procedure    Surgery, elective  Right shoulder    Surgery, elective  right knee wound debridement    S/P TKR (total knee replacement), right  with infection Mrsa   per pt he was cleared from MRSA infection    S/P CABG x 1  2018    Stented coronary artery  10/18 heart attack  INFERIOR WALL MI    Other postprocedural status  Fixation hardware in foot LEFT        MEDICATIONS:  Pulmonary:  diphenhydrAMINE   Injectable 25 milliGRAM(s) IV Push every 6 hours PRN    Antimicrobials:  cefepime   IVPB      cefepime   IVPB 2000 milliGRAM(s) IV Intermittent every 8 hours  DAPTOmycin IVPB 675 milliGRAM(s) IV Intermittent every 24 hours  rifAMPin 300 milliGRAM(s) Oral every 12 hours    Anticoagulants:  aspirin  chewable 81 milliGRAM(s) Oral two times a day  prasugrel 10 milliGRAM(s) Oral daily    Cardiac:  digoxin     Tablet 0.125 milliGRAM(s) Oral daily  metoprolol succinate ER 25 milliGRAM(s) Oral daily  spironolactone 25 milliGRAM(s) Oral daily  tamsulosin 0.4 milliGRAM(s) Oral at bedtime      Allergies    ACE inhibitors (Hives)  carvedilol (Other)  enalapril (Hives)  Entresto (Other)    Intolerances        Vital Signs Last 24 Hrs  T(C): 36.6 (16 Sep 2020 09:20), Max: 36.7 (15 Sep 2020 21:33)  T(F): 97.9 (16 Sep 2020 09:20), Max: 98.1 (15 Sep 2020 21:33)  HR: 86 (16 Sep 2020 09:20) (74 - 91)  BP: 117/69 (16 Sep 2020 09:20) (99/57 - 117/69)  BP(mean): --  RR: 17 (16 Sep 2020 09:20) (17 - 18)  SpO2: 99% (16 Sep 2020 09:20) (96% - 99%)    09-15 @ 07:01  -  09-16 @ 07:00  --------------------------------------------------------  IN: 1950 mL / OUT: 1700 mL / NET: 250 mL          Review of Systems:   •	General: negative  •	Skin/Breast: negative  •	Ophthalmologic: negative  •	ENMT: negative  •	Respiratory and Thorax: negative  •	Cardiovascular: negative  •	Gastrointestinal: negative  •	Genitourinary: negative  •	Musculoskeletal: negative  •	Neurological: negative  •	Psychiatric: negative  •	Hematology/Lymphatics: negative  •	Endocrine: negative  •	Allergic/Immunologic: negative    Physical Exam:   • Constitutional:	Well-developed, well nourished  • Eyes:	EOMI; PERRL; no drainage or redness  • ENMT:	No oral lesions; no gross abnormalities  • Neck	No bruits; no thyromegaly or nodules  • Breasts:	not examined  • Back:	No deformity or limitation of movement  • Respiratory:	Breath Sounds equal & clear to percussion & auscultation, no accessory muscle use  • Cardiovascular:	Regular rate & rhythm, normal S1, S2; no murmurs, gallops or rubs; no S3, S4  • Gastrointestinal:	Soft, non-tender, no hepatosplenomegaly, normal bowel sounds  • Genitourinary:	not examined  • Rectal: not examined  • Extremities:	No cyanosis, clubbing or edema  • Vascular:	Equal and normal pulses (carotid, femoral, dorsalis pedis)  • Neurologica:l	not examined  • Skin:	No lesions; no rash  • Lymph Nodes:	No lymphadedenopathy  • Musculoskeletal:	No joint pain, swelling or deformity; no limitation of movement        LABS:      CBC Full  -  ( 16 Sep 2020 07:42 )  WBC Count : 7.82 K/uL  RBC Count : 4.31 M/uL  Hemoglobin : 8.5 g/dL  Hematocrit : 30.4 %  Platelet Count - Automated : 355 K/uL  Mean Cell Volume : 70.5 fl  Mean Cell Hemoglobin : 19.7 pg  Mean Cell Hemoglobin Concentration : 28.0 gm/dL  Auto Neutrophil # : 5.15 K/uL  Auto Lymphocyte # : 1.21 K/uL  Auto Monocyte # : 0.98 K/uL  Auto Eosinophil # : 0.40 K/uL  Auto Basophil # : 0.06 K/uL  Auto Neutrophil % : 65.8 %  Auto Lymphocyte % : 15.5 %  Auto Monocyte % : 12.5 %  Auto Eosinophil % : 5.1 %  Auto Basophil % : 0.8 %    09-16    134<L>  |  104  |  29<H>  ----------------------------<  192<H>  4.4   |  20<L>  |  0.90    Ca    8.7      16 Sep 2020 07:42    TPro  6.5  /  Alb  3.0<L>  /  TBili  0.3  /  DBili  x   /  AST  15  /  ALT  13  /  AlkPhos  140<H>  09-16                        RADIOLOGY & ADDITIONAL STUDIES (The following images were personally reviewed):  Loja:                                     No  Urine output:                       adequate  DVT prophylaxis:                 Yes  Flattus:                                  Yes  Bowel movement:  yes

## 2020-09-16 NOTE — PROGRESS NOTE ADULT - SUBJECTIVE AND OBJECTIVE BOX
Pain Management Progress Note - [facility] [phone]      HPI: Patient seen and examined today. Patient with a history of knee pain, diabetic neuropathy, CHF, HTN, MRSA bacteremia, diverticulitis, COPD, hyperkalemia, chronic systolic CHF, osteoarthritis, s/p R Revision TKA and antibiotic spacer by Dr. Castro on 7/22, now presenting with R knee pain and swelling x3 days. Patient reports R hip and R knee pain, pain managed with current pain medication regimen. Patient Axox3, denies any itchiness from Morphine Po.  Reviewed pain medication regimen with patient at bedside. LLE dressing intact.       Pain is _x__ sharp ____dull ___burning _x__achy ___ Intensity: ____ mild _x__mod x__severe     Location _x___surgical site ____cervical _____lumbar ____abd ____upper ext____lower ext    Worse with _x___activity _x___movement _____physical therapy___ Rest    Improved with _x___medication __x__rest ____physical therapy      mupirocin 2% Ointment  nystatin Cream  morphine  IR  morphine  IR  nystatin Powder  morphine  - Injectable  chlorhexidine 2% Cloths  insulin glargine Injectable (LANTUS)  insulin glargine Injectable (LANTUS)  insulin glargine Injectable (LANTUS)  pantoprazole  Injectable  metoclopramide Injectable  acetaminophen   Tablet ..  diphenhydrAMINE   Injectable  lactated ringers.  levothyroxine  sodium chloride 0.9% Bolus  gabapentin  gabapentin  gabapentin  sertraline  insulin glargine Injectable (LANTUS)  (ADM OVERRIDE)  morphine  IR  morphine  IR  morphine  - Injectable  HYDROmorphone   Tablet  HYDROmorphone   Tablet  insulin lispro Injectable (HumaLOG)  diphenhydrAMINE  insulin glargine Injectable (LANTUS)  (Floorstock)  (Floorstock)  diazepam    Tablet  cefepime   IVPB  cefepime   IVPB  cefepime   IVPB  DAPTOmycin IVPB  insulin lispro Injectable (HumaLOG)  insulin glargine Injectable (LANTUS)  insulin lispro (HumaLOG) corrective regimen sliding scale  HYDROmorphone  Injectable  rifAMPin  glucagon  Injectable  dextrose 50% Injectable  dextrose 50% Injectable  dextrose 50% Injectable  dextrose 40% Gel  dextrose 5%.  lidocaine 2% Injectable  (ADM OVERRIDE)  lidocaine 2% Injectable  tamsulosin  rosuvastatin  gabapentin  cyclobenzaprine  spironolactone  atorvastatin  metoprolol succinate ER  prasugrel  digoxin     Tablet  vancomycin  IVPB  aspirin  chewable  HYDROmorphone  Injectable  senna  bisacodyl Suppository  polyethylene glycol 3350  ondansetron Injectable  aluminum hydroxide/magnesium hydroxide/simethicone Suspension  acetaminophen   Tablet ..  lactated ringers.  oxyCODONE    IR  oxyCODONE    IR  morphine  - Injectable  vancomycin  IVPB  vancomycin  IVPB  vancomycin  IVPB  oxyCODONE    IR  oxyCODONE    IR  rosuvastatin  lactated ringers.  chlorhexidine 2% Cloths  povidone iodine 5% Nasal Swab  spironolactone  diphenhydrAMINE  chlorhexidine 4% Liquid  sodium chloride 0.9% lock flush  cyclobenzaprine  gabapentin  HYDROmorphone  Injectable  HYDROmorphone   Tablet  HYDROmorphone   Tablet  HYDROmorphone  Injectable  glucagon  Injectable  dextrose 50% Injectable  dextrose 50% Injectable  dextrose 50% Injectable  dextrose 40% Gel  dextrose 5%.  insulin lispro (HumaLOG) corrective regimen sliding scale  tamsulosin  spironolactone  vancomycin  IVPB  pantoprazole    Tablet  silver sulfADIAZINE 1% Cream  metoprolol succinate ER  rifAMPin  prasugrel  atorvastatin  DULoxetine  tamsulosin Oral Tab/Cap - Peds  oxyCODONE    IR  oxyCODONE  ER Tablet  celecoxib  aspirin enteric coated  acetaminophen   Tablet ..  spironolactone Oral Tab/Cap - Peds  HYDROmorphone  Injectable      ROS: Const:  -___febrile   Eyes:___ENT:___CV: __-_chest pain  Resp: __-__sob  GI:_-__nausea __-_vomiting _x_ abd pain ___npo ___clears _x_full diet __bm  :___ [place]: _x__pain _-__spasm  Skin:___ Neuro:  _-__dwqztxhq_-__jcuwscrqj_-__ numbness _-__weakness __x_paresth  Psych:_-_anxiety  Endo:___ Heme:___Allergy:_________, _x__all others reviewed and negative      PAST MEDICAL & SURGICAL HISTORY:  Diabetic neuropathy  STEMI (ST elevation myocardial infarction)  Diverticulitis  MRSA bacteremia  History of celiac disease  CHF (congestive heart failure): EF ~ 25%  HTN (hypertension)  Diabetes: on insulin pump  Blood clot due to device, implant, or graft: was on blood thinners  HLD (hyperlipidemia)  Osteoarthritis  Atherosclerosis of coronary artery: CAD (coronary artery disease)  Status post percutaneous transluminal coronary angioplasty: in 2012  History of open reduction and internal fixation (ORIF) procedure  Surgery, elective: Right shoulder  Surgery, elective: right knee wound debridement  S/P TKR (total knee replacement), right: with infection Mrsa   per pt he was cleared from MRSA infection  S/P CABG x 1: 2018  Stented coronary artery: 10/18 heart attack  INFERIOR WALL MI  Other postprocedural status: Fixation hardware in foot LEFT      09-16 @ 07:4291 mL/min/1.73M2        Hemoglobin: 8.5 g/dL (09-16 @ 07:42)  Hemoglobin: 8.3 g/dL (09-15 @ 05:55)        T(C): 36.7 (09-16-20 @ 12:20), Max: 36.7 (09-15-20 @ 21:33)  HR: 80 (09-16-20 @ 12:20) (80 - 91)  BP: 118/59 (09-16-20 @ 12:20) (108/68 - 118/59)  RR: 16 (09-16-20 @ 12:20) (16 - 18)  SpO2: 99% (09-16-20 @ 12:20) (96% - 99%)  Wt(kg): --           PHYSICAL EXAM:  Gen Appearance: x___no acute distress _x__appropriate        Neuro: _x__SILT feet____ EOM Intact Psych: AAOX__3, x___mood/affect appropriate        Eyes: __x_conjunctiva WNL  __x__ Pupils equal and round        ENT: x___ears and nose atraumatic_x__ Hearing grossly intact        Neck: _x__trachea midline, no visible masses ___thyroid without palpable mass    Resp: _x__Nml WOB____No tactile fremitus ___clear to auscultation    Cardio: ___extremities free from edema __x__pedal pulses palpable    GI/Abdomen: __x_soft ___x__ Nontender___x___Nondistended_____HSM    Lymphatic: ___no palpable nodes in neck  ___no palpable nodes calves and feet    Skin/Wound: ___Incision, _x__Dressing c/d/i,   ____surrounding tissues soft,  ___drain/chest tube present____    Muscular: EHL _4__/5  Gastrocnemius_4__/5    ___absent clubbing/cyanosis          ASSESSMENT: This is a 65y old Male with a history of knee pain, diabetic neuropathy, CHF, HTN, MRSA bacteremia, diverticulitis, COPD, hyperkalemia, chronic systolic CHF, osteoarthritis,  s/p R Revision TKA and antibiotic spacer by Dr. Castro on 7/22, now presenting with R knee pain and swelling x3 days, pain managed with current pain medication regimen.       Recommended Treatment PLAN:  1. Morphine IR 15-30mg Po Q4h prn moderate to severe pain  2. Flexeril 5mg PO Q8h prn muscle spasms  3. Gabapentin 100mg PO TID  4. Morphine 2mg IVP Q4h prn breakthrough pain  Plan discussed with Dr. Max

## 2020-09-16 NOTE — CHART NOTE - NSCHARTNOTEFT_GEN_A_CORE
Admitting Diagnosis:   Patient is a 65y old  Male who presents with a chief complaint of R Knee Swelling (16 Sep 2020 14:55)    PAST MEDICAL & SURGICAL HISTORY:  Diabetic neuropathy  STEMI (ST elevation myocardial infarction)  Diverticulitis  MRSA bacteremia  History of celiac disease  CHF (congestive heart failure)  EF ~ 25%  HTN (hypertension)  Diabetes  on insulin pump  Blood clot due to device, implant, or graft  was on blood thinners  HLD (hyperlipidemia)  Osteoarthritis  Atherosclerosis of coronary artery  CAD (coronary artery disease)  Status post percutaneous transluminal coronary angioplasty  in 2012  History of open reduction and internal fixation (ORIF) procedure  Surgery, elective  Right shoulder  Surgery, elective  right knee wound debridement  S/P TKR (total knee replacement), right  with infection Mrsa   per pt he was cleared from MRSA infection  S/P CABG x 1 2018  Stented coronary artery  10/18 heart attack  INFERIOR WALL MI  Other postprocedural status  Fixation hardware in foot LEFT    Current Nutrition Order: CST CHO diet with Glucerna Shakes TID (660 kcal, 30g protein, 600mL H2O)      PO Intake: Good (%) [ x  ]  Fair (50-75%) [   ] Poor (<25%) [   ]    GI Issues:   WDL, last BM 9/15  No n/v/d/c noted  No abd distention noted    Pain:  9/10 right knee pain noted- well controlled with pain regime    Skin Integrity:  Surgical incision, arelis score 20  RUQ drainage of wound of left foot and knee/surgical incision.  No edema present  No pressure ulcers noted    Labs:   09-16    134<L>  |  104  |  29<H>  ----------------------------<  192<H>  4.4   |  20<L>  |  0.90    Ca    8.7      16 Sep 2020 07:42    TPro  6.5  /  Alb  3.0<L>  /  TBili  0.3  /  DBili  x   /  AST  15  /  ALT  13  /  AlkPhos  140<H>  09-16    CAPILLARY BLOOD GLUCOSE    POCT Blood Glucose.: 110 mg/dL (16 Sep 2020 12:06)  POCT Blood Glucose.: 159 mg/dL (16 Sep 2020 08:40)  POCT Blood Glucose.: 180 mg/dL (16 Sep 2020 07:16)  POCT Blood Glucose.: 114 mg/dL (15 Sep 2020 21:41)  POCT Blood Glucose.: 163 mg/dL (15 Sep 2020 18:09)    Medications:  MEDICATIONS  (STANDING):  aspirin  chewable 81 milliGRAM(s) Oral two times a day  BACItracin   Ointment 1 Application(s) Topical two times a day  cefepime   IVPB      cefepime   IVPB 2000 milliGRAM(s) IV Intermittent every 8 hours  chlorhexidine 2% Cloths 1 Application(s) Topical <User Schedule>  DAPTOmycin IVPB 675 milliGRAM(s) IV Intermittent every 24 hours  dextrose 5%. 1000 milliLiter(s) (50 mL/Hr) IV Continuous <Continuous>  dextrose 50% Injectable 12.5 Gram(s) IV Push once  dextrose 50% Injectable 25 Gram(s) IV Push once  dextrose 50% Injectable 25 Gram(s) IV Push once  digoxin     Tablet 0.125 milliGRAM(s) Oral daily  gabapentin 100 milliGRAM(s) Oral <User Schedule>  gabapentin 200 milliGRAM(s) Oral at bedtime  insulin glargine Injectable (LANTUS) 10 Unit(s) SubCutaneous at bedtime  insulin lispro (HumaLOG) corrective regimen sliding scale   SubCutaneous Before meals and at bedtime  insulin lispro Injectable (HumaLOG) 8 Unit(s) SubCutaneous three times a day before meals  lactated ringers. 1000 milliLiter(s) (50 mL/Hr) IV Continuous <Continuous>  levothyroxine 50 MICROGram(s) Oral daily  lidocaine 2% Injectable 10 milliLiter(s) Local Injection once  metoprolol succinate ER 25 milliGRAM(s) Oral daily  nystatin Powder 1 Application(s) Topical two times a day  pantoprazole  Injectable 40 milliGRAM(s) IV Push daily  polyethylene glycol 3350 17 Gram(s) Oral daily  prasugrel 10 milliGRAM(s) Oral daily  rifAMPin 300 milliGRAM(s) Oral every 12 hours  rosuvastatin 10 milliGRAM(s) Oral at bedtime  sertraline 25 milliGRAM(s) Oral daily  spironolactone 25 milliGRAM(s) Oral daily  tamsulosin 0.4 milliGRAM(s) Oral at bedtime    MEDICATIONS  (PRN):  acetaminophen   Tablet .. 650 milliGRAM(s) Oral every 6 hours PRN Temp greater or equal to 38C (100.4F), Mild Pain (1 - 3)  aluminum hydroxide/magnesium hydroxide/simethicone Suspension 30 milliLiter(s) Oral four times a day PRN Indigestion  bisacodyl Suppository 10 milliGRAM(s) Rectal daily PRN If no bowel movement by POD#2  cyclobenzaprine 5 milliGRAM(s) Oral three times a day PRN Muscle Spasm  dextrose 40% Gel 15 Gram(s) Oral once PRN Blood Glucose LESS THAN 70 milliGRAM(s)/deciliter  diphenhydrAMINE   Injectable 25 milliGRAM(s) IV Push every 6 hours PRN Rash and/or Itching  glucagon  Injectable 1 milliGRAM(s) IntraMuscular once PRN Glucose LESS THAN 70 milligrams/deciliter  metoclopramide Injectable 10 milliGRAM(s) IV Push every 6 hours PRN breakthrough nausea  morphine  - Injectable 2 milliGRAM(s) IV Push every 4 hours PRN breakthrough pain  morphine  IR 15 milliGRAM(s) Oral every 4 hours PRN Moderate Pain (4 - 6)  morphine  IR 30 milliGRAM(s) Oral every 4 hours PRN Severe Pain (7 - 10)  ondansetron Injectable 4 milliGRAM(s) IV Push every 6 hours PRN Nausea and/or Vomiting  senna 2 Tablet(s) Oral at bedtime PRN Constipation    Admitted Anthropometrics:  Height: 74" Weight: 185lbs, IBW 190lbs+/-10%, %IBW 97%, BMI 23.7 kg/m2    Weight:  July 2020 183lbs (previous admission)  9/1 185lbs  9/15 210lbs (    Weight Change: Pt with sudden weight gain over the last 2 weeks (+25lbs), please reweigh to confirm weight changes and then trend biweekly.     Nutrition Focused Physical Exam: Completed [   ]  Unable to complete [ x  ]    Estimated energy needs:   ABW (84kg) used for calculations as pt between % of IBW. Needs adjusted for wound healing.   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  He has completed 6 weeks of vancomycin & rifampin.  Inflammatory markers remained very elevated as outpatient, inpatient values downtrending and improved from discharge last month.  His knee was aspirated by Dr. Castro on 9/2/2020. Pt with other potential sources for infection/inflammation and is undergoing evaluation by Surgery regarding reported RUQ persistent drainage and right knee infection. Pt started on abx course. Plan for OR friday 9/18 per disc with team during interdisciplinary rounds. On assessment, pt is resting in bed without complaints. Currently on CST CHO diet with Glucerna Shakes TID (660 kcal, 30g protein, 600mL H2O). Pt consuming >75% est needs. Cont to encourage adqeuate PO intake. Sudden weight gain noted over the last two weight- likely inaccurate weight. Please obtain new weight when feasible and trend biweekly. Edu on CST CHO diet provided- pt receptive but suspect pt needs constant reenforcement. Please see nutrition recs below. RD to follow up per protocol.     Nutrition Diagnosis: Increased pro needs RT increased nutrient demand 2/2 wound healing AEB multiple wounds noted and planned right hip VINOD     [  ] No active nutrition diagnosis at this time  [  ] Current medical condition precludes nutrition intervention    Goal: Consistently meet >75% est needs    Recommendations:  1. CST CHO diet with Glucerna Shakes TID (660 kcal, 30g protein, 600mL H2O)   2. Pain and bowel regime per team  3. Tight BS control  4. RD diet education reenforcement prn  5. Recommend reweigh and then trend weights biweekly.     Education: Edu on CST CHO diet provided- pt receptive but suspect pt needs constant reenforcement.    Risk Level: High [  ] Moderate [ x  ] Low [   ].

## 2020-09-16 NOTE — PROGRESS NOTE ADULT - ASSESSMENT
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  He has completed 6 weeks of vancomycin & rifampin.  He is s/p R hip exchange of hardware on 9/4 - OR cultures NGTD.  I&D of abd wall done 9/4 - cultures growing Pseudomonas and Enterococcus faecium. Gallium SPECT done 9/10 showed minimal uptake from GB fossa to soft tissue and was interpreted by surgery as a negative study. Now planned for R knee spacer exchange, muscle flap and L shin ulcer debridement on 9/18.    Recommend:  1.  Continue Daptomycin 675 mg IV daily.  CK wnl 9/14; next due 9/21  2.  Continue Cefepime 2 grams IV q8hrs (can consider completing 2 week course through 9/18)  3.  Continue rifampin 300 mg PO q12   4.  Continue topical nystatin to groin for suspected Candida intertrigo    Dr. Casillas resumes care Thursday 9/17    ID Team 2

## 2020-09-16 NOTE — CONSULT NOTE ADULT - SUBJECTIVE AND OBJECTIVE BOX
Attending:    Patient is a 65y old  Male who presents with a chief complaint of R Knee Swelling scheduled for a R knee abx spacer exchange and gastroc flap procedure s/p pyogenic arthritis this Friday. The patient states that he requested a Podiatry consult during this admission because he is concerned about his feet. He has a history of diabetic ulcers (bilaterally) and has had made pedal surgeries performed. The patient states that 2 years ago he was seeing Dr. Davalos wound care and had a 1st mpj athrodesis performed b/l. He has also had partial amputations on his L 2nd and 3rd digits and a flexor tenotomy on digits 2-4 on the R foot. The patient is presently following Dr. Hooper, however has not followed up with him for the past year d/t his issues with his knee. The patient denies any pain in his feet and admits to peripheral neuropathy (stocking distribution) from distal tibia down.       HPI:  Juanito Blas is a 66y/o M with PMHx of CAD (s/p CABG 2018), CHF (EF 25%) Diabetes, HLD, HTN, R Revision Total Knee Arthroplasty with Antibiotic Spacer on 7/22 by Dr. Castro for Prosthetic Joint Infection, presents with 2-3 days of worsening R knee pain and swelling. Pt denies any trauma to R knee. Pt denies any numbness/tingling. Notes minimal drainage from incision, no purulence. Pt denies any recent illness. Pt was discharged last hospital visit 3 weeks ago after multiple revision knee surgeries complicated by prosthetic joint infection and bacteremia. Sent home with PICC line, getting vancomycin 1000 Q12 and Rifampin 300 Q12 daily. Reports compliance with medication. Denies any chest pain/SOB/fevers/chills.     Pt also has chronic large skin defect on L anterior tibia, which was managed by vascular and plastic surgery last visit. Only reports mild pain over this wound, denies any drainage, trauma. (01 Sep 2020 23:12)    Review of systems negative except per HPI    PAST MEDICAL & SURGICAL HISTORY:  Diabetic neuropathy    STEMI (ST elevation myocardial infarction)    Diverticulitis    MRSA bacteremia    History of celiac disease    CHF (congestive heart failure)  EF ~ 25%    HTN (hypertension)    Diabetes  on insulin pump    Blood clot due to device, implant, or graft  was on blood thinners    HLD (hyperlipidemia)    Osteoarthritis    Atherosclerosis of coronary artery  CAD (coronary artery disease)    Status post percutaneous transluminal coronary angioplasty  in 2012    History of open reduction and internal fixation (ORIF) procedure    Surgery, elective  Right shoulder    Surgery, elective  right knee wound debridement    S/P TKR (total knee replacement), right  with infection Mrsa   per pt he was cleared from MRSA infection    S/P CABG x 1  2018    Stented coronary artery  10/18 heart attack  INFERIOR WALL MI    Other postprocedural status  Fixation hardware in foot LEFT      Home Medications:  atorvastatin 40 mg oral tablet: 1 tab(s) orally once a day (at bedtime) (19 Jul 2020 12:06)  digoxin 125 mcg (0.125 mg) oral tablet: 1 tab(s) orally once a day (19 Jul 2020 12:06)  DULoxetine 30 mg oral delayed release capsule: 3 cap(s) orally once a day (19 Jul 2020 12:06)  pantoprazole 40 mg oral delayed release tablet: 1 tab(s) orally once a day, take 30 minutes before breakfast (19 Jul 2020 12:06)  prasugrel 10 mg oral tablet: 1 tab(s) orally once a day (19 Jul 2020 12:06)  spironolactone 25 mg oral tablet: 1 tab(s) orally once a day (23 Jul 2020 14:03)    Allergies    ACE inhibitors (Hives)  carvedilol (Other)  enalapril (Hives)  Entresto (Other)    Intolerances      FAMILY HISTORY:  Family history of allergies  daughter    Family history of heart disease (Mother)      Social History:       LABS                        8.5    7.82  )-----------( 355      ( 16 Sep 2020 07:42 )             30.4     09-16    134<L>  |  104  |  29<H>  ----------------------------<  192<H>  4.4   |  20<L>  |  0.90    Ca    8.7      16 Sep 2020 07:42    TPro  6.5  /  Alb  3.0<L>  /  TBili  0.3  /  DBili  x   /  AST  15  /  ALT  13  /  AlkPhos  140<H>  09-16      ESR: 22  CRP: --  09-16 @ 07:42    Vital Signs Last 24 Hrs  T(C): 36.6 (16 Sep 2020 09:20), Max: 36.7 (15 Sep 2020 21:33)  T(F): 97.9 (16 Sep 2020 09:20), Max: 98.1 (15 Sep 2020 21:33)  HR: 86 (16 Sep 2020 09:20) (74 - 91)  BP: 117/69 (16 Sep 2020 09:20) (99/57 - 117/69)  BP(mean): --  RR: 17 (16 Sep 2020 09:20) (17 - 18)  SpO2: 99% (16 Sep 2020 09:20) (96% - 99%)    PHYSICAL EXAM  General: NAD, AA0x3    Lower Extremity Focused:  Vasc: DP/PT 1-4 b/l, +2 nisa malleolar and dorsum of the foot pitting edema b/l, CFT <3sec b/l  Derm: 2 callus lesions present on the R foot (one on the superior aspect of the 2nd digit and one on the dorsum of the 3rd), small circular lesions present throughout his leg d/t to injuries related to his cast, Tinea Pedia b/l, onychomycosis. Purulence noted once 2nd digit callus was debrided. Maceration noted on the plantar aspect of the R hallux (0.9cm deep and does not probe to bone)- no discharge or purulence noted.  Neuro: Protective sensations not intact from the distal tibia down (stocking distribution)  MSK: Partial amp of digits 2 and 3 on L foot, hammertoes on digits 2-5 on R foot, Hallux rigidus b/l,     RADIOLOGY  Ordered b/l xrays of foot: 2 views

## 2020-09-17 ENCOUNTER — TRANSCRIPTION ENCOUNTER (OUTPATIENT)
Age: 65
End: 2020-09-17

## 2020-09-17 LAB
ALBUMIN SERPL ELPH-MCNC: 3.1 G/DL — LOW (ref 3.3–5)
ALP SERPL-CCNC: 163 U/L — HIGH (ref 40–120)
ALT FLD-CCNC: 16 U/L — SIGNIFICANT CHANGE UP (ref 10–45)
ANION GAP SERPL CALC-SCNC: 12 MMOL/L — SIGNIFICANT CHANGE UP (ref 5–17)
APPEARANCE UR: CLEAR — SIGNIFICANT CHANGE UP
AST SERPL-CCNC: 18 U/L — SIGNIFICANT CHANGE UP (ref 10–40)
BILIRUB SERPL-MCNC: 0.7 MG/DL — SIGNIFICANT CHANGE UP (ref 0.2–1.2)
BILIRUB UR-MCNC: ABNORMAL
BUN SERPL-MCNC: 28 MG/DL — HIGH (ref 7–23)
CALCIUM SERPL-MCNC: 8.8 MG/DL — SIGNIFICANT CHANGE UP (ref 8.4–10.5)
CHLORIDE SERPL-SCNC: 101 MMOL/L — SIGNIFICANT CHANGE UP (ref 96–108)
CO2 SERPL-SCNC: 18 MMOL/L — LOW (ref 22–31)
COLOR SPEC: YELLOW — SIGNIFICANT CHANGE UP
CREAT SERPL-MCNC: 0.89 MG/DL — SIGNIFICANT CHANGE UP (ref 0.5–1.3)
CRP SERPL-MCNC: 3.61 MG/DL — HIGH (ref 0–0.4)
DIFF PNL FLD: ABNORMAL
GLUCOSE BLDC GLUCOMTR-MCNC: 106 MG/DL — HIGH (ref 70–99)
GLUCOSE BLDC GLUCOMTR-MCNC: 148 MG/DL — HIGH (ref 70–99)
GLUCOSE BLDC GLUCOMTR-MCNC: 216 MG/DL — HIGH (ref 70–99)
GLUCOSE SERPL-MCNC: 132 MG/DL — HIGH (ref 70–99)
GLUCOSE UR QL: NEGATIVE — SIGNIFICANT CHANGE UP
HCT VFR BLD CALC: 34.2 % — LOW (ref 39–50)
HGB BLD-MCNC: 9.8 G/DL — LOW (ref 13–17)
KETONES UR-MCNC: ABNORMAL MG/DL
LEUKOCYTE ESTERASE UR-ACNC: NEGATIVE — SIGNIFICANT CHANGE UP
MCHC RBC-ENTMCNC: 20.3 PG — LOW (ref 27–34)
MCHC RBC-ENTMCNC: 28.7 GM/DL — LOW (ref 32–36)
MCV RBC AUTO: 70.8 FL — LOW (ref 80–100)
NITRITE UR-MCNC: NEGATIVE — SIGNIFICANT CHANGE UP
NRBC # BLD: 0 /100 WBCS — SIGNIFICANT CHANGE UP (ref 0–0)
PH UR: 6 — SIGNIFICANT CHANGE UP (ref 5–8)
PLATELET # BLD AUTO: 373 K/UL — SIGNIFICANT CHANGE UP (ref 150–400)
POTASSIUM SERPL-MCNC: 4.4 MMOL/L — SIGNIFICANT CHANGE UP (ref 3.5–5.3)
POTASSIUM SERPL-SCNC: 4.4 MMOL/L — SIGNIFICANT CHANGE UP (ref 3.5–5.3)
PROT SERPL-MCNC: 7.1 G/DL — SIGNIFICANT CHANGE UP (ref 6–8.3)
PROT UR-MCNC: 100 MG/DL
RBC # BLD: 4.83 M/UL — SIGNIFICANT CHANGE UP (ref 4.2–5.8)
RBC # FLD: 20.6 % — HIGH (ref 10.3–14.5)
SODIUM SERPL-SCNC: 131 MMOL/L — LOW (ref 135–145)
SP GR SPEC: >=1.03 — SIGNIFICANT CHANGE UP (ref 1–1.03)
UROBILINOGEN FLD QL: 0.2 E.U./DL — SIGNIFICANT CHANGE UP
WBC # BLD: 14.09 K/UL — HIGH (ref 3.8–10.5)
WBC # FLD AUTO: 14.09 K/UL — HIGH (ref 3.8–10.5)

## 2020-09-17 PROCEDURE — 99233 SBSQ HOSP IP/OBS HIGH 50: CPT

## 2020-09-17 PROCEDURE — 99232 SBSQ HOSP IP/OBS MODERATE 35: CPT | Mod: GC

## 2020-09-17 RX ORDER — SODIUM CHLORIDE 9 MG/ML
1000 INJECTION, SOLUTION INTRAVENOUS
Refills: 0 | Status: DISCONTINUED | OUTPATIENT
Start: 2020-09-17 | End: 2020-09-18

## 2020-09-17 RX ORDER — MORPHINE SULFATE 50 MG/1
30 CAPSULE, EXTENDED RELEASE ORAL EVERY 4 HOURS
Refills: 0 | Status: DISCONTINUED | OUTPATIENT
Start: 2020-09-17 | End: 2020-09-18

## 2020-09-17 RX ORDER — MORPHINE SULFATE 50 MG/1
15 CAPSULE, EXTENDED RELEASE ORAL EVERY 4 HOURS
Refills: 0 | Status: DISCONTINUED | OUTPATIENT
Start: 2020-09-17 | End: 2020-09-18

## 2020-09-17 RX ORDER — MORPHINE SULFATE 50 MG/1
2 CAPSULE, EXTENDED RELEASE ORAL EVERY 4 HOURS
Refills: 0 | Status: DISCONTINUED | OUTPATIENT
Start: 2020-09-17 | End: 2020-09-18

## 2020-09-17 RX ADMIN — CEFEPIME 100 MILLIGRAM(S): 1 INJECTION, POWDER, FOR SOLUTION INTRAMUSCULAR; INTRAVENOUS at 17:39

## 2020-09-17 RX ADMIN — Medication 25 MILLIGRAM(S): at 06:22

## 2020-09-17 RX ADMIN — Medication 8 UNIT(S): at 09:14

## 2020-09-17 RX ADMIN — MORPHINE SULFATE 15 MILLIGRAM(S): 50 CAPSULE, EXTENDED RELEASE ORAL at 18:01

## 2020-09-17 RX ADMIN — DAPTOMYCIN 127 MILLIGRAM(S): 500 INJECTION, POWDER, LYOPHILIZED, FOR SOLUTION INTRAVENOUS at 01:22

## 2020-09-17 RX ADMIN — Medication 1 APPLICATION(S): at 17:39

## 2020-09-17 RX ADMIN — CEFEPIME 100 MILLIGRAM(S): 1 INJECTION, POWDER, FOR SOLUTION INTRAMUSCULAR; INTRAVENOUS at 00:38

## 2020-09-17 RX ADMIN — CEFEPIME 100 MILLIGRAM(S): 1 INJECTION, POWDER, FOR SOLUTION INTRAMUSCULAR; INTRAVENOUS at 07:06

## 2020-09-17 RX ADMIN — Medication 650 MILLIGRAM(S): at 06:09

## 2020-09-17 RX ADMIN — Medication 25 MILLIGRAM(S): at 17:55

## 2020-09-17 RX ADMIN — SERTRALINE 25 MILLIGRAM(S): 25 TABLET, FILM COATED ORAL at 11:58

## 2020-09-17 RX ADMIN — SPIRONOLACTONE 25 MILLIGRAM(S): 25 TABLET, FILM COATED ORAL at 06:22

## 2020-09-17 RX ADMIN — Medication 650 MILLIGRAM(S): at 07:05

## 2020-09-17 RX ADMIN — PANTOPRAZOLE SODIUM 40 MILLIGRAM(S): 20 TABLET, DELAYED RELEASE ORAL at 11:58

## 2020-09-17 RX ADMIN — Medication 8 UNIT(S): at 13:29

## 2020-09-17 RX ADMIN — Medication 8 UNIT(S): at 18:03

## 2020-09-17 RX ADMIN — Medication 4: at 18:03

## 2020-09-17 RX ADMIN — Medication 50 MICROGRAM(S): at 06:22

## 2020-09-17 RX ADMIN — Medication 81 MILLIGRAM(S): at 06:22

## 2020-09-17 RX ADMIN — PRASUGREL 10 MILLIGRAM(S): 5 TABLET, FILM COATED ORAL at 11:58

## 2020-09-17 RX ADMIN — GABAPENTIN 100 MILLIGRAM(S): 400 CAPSULE ORAL at 13:39

## 2020-09-17 RX ADMIN — Medication 0.12 MILLIGRAM(S): at 06:24

## 2020-09-17 RX ADMIN — GABAPENTIN 200 MILLIGRAM(S): 400 CAPSULE ORAL at 23:37

## 2020-09-17 RX ADMIN — GABAPENTIN 100 MILLIGRAM(S): 400 CAPSULE ORAL at 06:22

## 2020-09-17 RX ADMIN — TAMSULOSIN HYDROCHLORIDE 0.4 MILLIGRAM(S): 0.4 CAPSULE ORAL at 23:36

## 2020-09-17 RX ADMIN — Medication 1 APPLICATION(S): at 06:22

## 2020-09-17 RX ADMIN — ROSUVASTATIN CALCIUM 10 MILLIGRAM(S): 5 TABLET ORAL at 23:37

## 2020-09-17 NOTE — PROGRESS NOTE ADULT - SUBJECTIVE AND OBJECTIVE BOX
Ortho Note    Pt comfortable without complaints, pain controlled  Denies CP, SOB, N/V, numbness/tingling     Vital Signs Last 24 Hrs  T(C): 37.9 (17 Sep 2020 05:59), Max: 37.9 (17 Sep 2020 05:59)  T(F): 100.2 (17 Sep 2020 05:59), Max: 100.2 (17 Sep 2020 05:59)  HR: 110 (17 Sep 2020 05:59) (75 - 110)  BP: 114/69 (17 Sep 2020 05:59) (102/58 - 118/59)  BP(mean): --  RR: 18 (17 Sep 2020 05:59) (16 - 18)  SpO2: 95% (17 Sep 2020 05:59) (95% - 99%)    RLE:  DSG- R hip gauze/ teg CDI; R knee gauze/ teg CDI; kerlix around recently debrided R foot (diabetic ulcers), managed by Podiatry  Pulses: +DP;  feet cool to touch; at baseline; no edema  Sensation: Sensation intact to baseline (diminished sensation- h/o severe neuropathy; follows with vascular Dr. Malloy outpatient)  Motor: 5/5 EHL/FHL/TA/GS    LLE- open healing vascular wound; wound bed pink; no erythema, odor, or purulent drainage ; covered with WTD dsg  Pulses: feet cool to touch; at baseline; no edema  Sensation: SILT to baseline (diminished sensation- h/o severe neuropathy; follows with vascular Dr. Malloy outpatient)  Motor: 5/5 EHL/FHL/TA/GS                 A/P: 65yMale admitted for R knee pain, drainage s/p right knee arthroscopic I+D 7/17, right knee explant and abx spacer 7/22 with Dr. Castro;  and debridement of LLE wound with wound vac placement on 7/30 by Dr. Bowen. Was original d/c'd home on 8/10/20 with PICC and vancomycin IV q12h + rifampin.  Readmitted, and now s/p R hip exchange of hardware on 09/04/2020  - F/u AM CBC. Patient will go to OR for R knee spacer exchange + gastroc flap with Plastics Dr. Bran; goal H+H prior to OR 10.0/30.0 for anticipated post-op drop.  - Pain Control- appreciate pain management recs  - DVT ppx: ASA + effient (home med)  - PT, WBS: WBAT  - OOB for meals, aggressive I/S  - continue bowel regimen  - appreciate ID recs- Continue dapto, cefepime, rifampin; check CPK 9/21 with dapto; f/o CBC with diff and CMP (elevated liver enzymes in past with rifampin)  - appreciate internal medicine, and cardiology recs (seen by Dr. Caldera today- plan to continue home meds)  - appreciate plastics recs- continue WTD dressings on LLE vascular wound qd and PRN  - appreciate gen surg recs- will need cholecystectomy (elective) in near future, currently no abd pain, n/v; dressing changes as per nursing; no acute drainage from RUQ site  - appreciate psych recs- currently feels well on regimen today, will reconsult as needed  - podiatry consulted for R foot diabetic ulcers x2- debrided at bedside; bactroban applied and dsg changes as per podiatry  - dispo: OR Tomorrow for R knee spacer exchange and gastroc flap  - NPO after midnight tonight    Ortho Pager [phone]    Ortho Pager [phone]

## 2020-09-17 NOTE — PROGRESS NOTE ADULT - ATTENDING COMMENTS
Seen by me this evening. In a malaise but not frankly depressed today. Had a low grade temp this morning that has normalized. UA negative. Increased BLE swelling.     RLE: generalized edema noted. Patellar wound fibrinous base, no surrounding erythema or active drainage. Foot wrapped in Kerlix from recent podiatric debridement.  LLE: generalized edema noted. Left anterior leg wound moist with fibrinous/granulation bed, serous drainage.     WBC 14 this morning, workup for new infectious workup ongoing, no clear culprit so far  Inflammatory markers remain persistently elevated    Plan for R knee spacer to spacer exchange, complex wound closure vs. medial gastroc flap coverage, left leg debridement, possible STSG on both sides. R/B/A again reviewed with the patient. On schedule for OR 11:30am tomorrow.

## 2020-09-17 NOTE — PROGRESS NOTE ADULT - ASSESSMENT
Patient is a 65y old  Male who presents with a chief complaint of R Knee Swelling scheduled for a R knee abx spacer exchange and gastroc flap procedure s/p pyogenic arthritis this Friday. He has requested a podiatry consult for diabetic foot ulcers on the right foot. Right foot found to have Rose grade 1 ulcers on 2nd and 3rd digits.    Plan:  Bactroban applied to digital wounds  Betadine applied to hallux   Dressed the foot in DSD and advised patient to avoid pressure to the ulcer sites  Plan discussed with Dr. Davalos  Podiatry will continue to follow

## 2020-09-17 NOTE — PROGRESS NOTE ADULT - SUBJECTIVE AND OBJECTIVE BOX
Interval Events: Reviewed  Patient seen and examined at bedside.    Patient is a 65y old  Male who presents with a chief complaint of R Knee Swelling (17 Sep 2020 14:51)  he is tired today    PAST MEDICAL & SURGICAL HISTORY:  Diabetic neuropathy    STEMI (ST elevation myocardial infarction)    Diverticulitis    MRSA bacteremia    History of celiac disease    CHF (congestive heart failure)  EF ~ 25%    HTN (hypertension)    Diabetes  on insulin pump    Blood clot due to device, implant, or graft  was on blood thinners    HLD (hyperlipidemia)    Osteoarthritis    Atherosclerosis of coronary artery  CAD (coronary artery disease)    Status post percutaneous transluminal coronary angioplasty  in 2012    History of open reduction and internal fixation (ORIF) procedure    Surgery, elective  Right shoulder    Surgery, elective  right knee wound debridement    S/P TKR (total knee replacement), right  with infection Mrsa   per pt he was cleared from MRSA infection    S/P CABG x 1  2018    Stented coronary artery  10/18 heart attack  INFERIOR WALL MI    Other postprocedural status  Fixation hardware in foot LEFT        MEDICATIONS:  Pulmonary:  diphenhydrAMINE   Injectable 25 milliGRAM(s) IV Push every 6 hours PRN    Antimicrobials:  cefepime   IVPB      cefepime   IVPB 2000 milliGRAM(s) IV Intermittent every 8 hours  DAPTOmycin IVPB 675 milliGRAM(s) IV Intermittent every 24 hours  rifAMPin 300 milliGRAM(s) Oral every 12 hours    Anticoagulants:    Cardiac:  digoxin     Tablet 0.125 milliGRAM(s) Oral daily  metoprolol succinate ER 25 milliGRAM(s) Oral daily  spironolactone 25 milliGRAM(s) Oral daily  tamsulosin 0.4 milliGRAM(s) Oral at bedtime      Allergies    ACE inhibitors (Hives)  carvedilol (Other)  enalapril (Hives)  Entresto (Other)    Intolerances        Vital Signs Last 24 Hrs  T(C): 36.5 (17 Sep 2020 20:35), Max: 37.9 (17 Sep 2020 05:59)  T(F): 97.7 (17 Sep 2020 20:35), Max: 100.2 (17 Sep 2020 05:59)  HR: 77 (17 Sep 2020 20:35) (77 - 110)  BP: 100/56 (17 Sep 2020 20:35) (94/53 - 114/69)  BP(mean): --  RR: 17 (17 Sep 2020 20:35) (16 - 18)  SpO2: 100% (17 Sep 2020 20:35) (95% - 100%)    09-16 @ 07:01 - 09-17 @ 07:00  --------------------------------------------------------  IN: 1325 mL / OUT: 775 mL / NET: 550 mL    09-17 @ 07:01 - 09-17 @ 22:36  --------------------------------------------------------  IN: 0 mL / OUT: 900 mL / NET: -900 mL          Review of Systems:   •	General: negative  •	Skin/Breast: negative  •	Ophthalmologic: negative  •	ENMT: negative  •	Respiratory and Thorax: negative  •	Cardiovascular: negative  •	Gastrointestinal: negative  •	Genitourinary: negative  •	Musculoskeletal: negative  •	Neurological: negative  •	Psychiatric: negative  •	Hematology/Lymphatics: negative  •	Endocrine: negative  •	Allergic/Immunologic: negative    Physical Exam:   • Constitutional:	Well-developed, well nourished  • Eyes:	EOMI; PERRL; no drainage or redness  • ENMT:	No oral lesions; no gross abnormalities  • Neck	No bruits; no thyromegaly or nodules  • Breasts:	not examined  • Back:	No deformity or limitation of movement  • Respiratory:	Breath Sounds equal & clear to percussion & auscultation, no accessory muscle use  • Cardiovascular:	Regular rate & rhythm, normal S1, S2; no murmurs, gallops or rubs; no S3, S4  • Gastrointestinal:	Soft, non-tender, no hepatosplenomegaly, normal bowel sounds  • Genitourinary:	not examined  • Rectal: not examined  • Extremities:	No cyanosis, clubbing or edema  • Vascular:	Equal and normal pulses (carotid, femoral, dorsalis pedis)  • Neurologica:l	not examined  • Skin:	No lesions; no rash  • Lymph Nodes:	No lymphadedenopathy  • Musculoskeletal:	No joint pain, swelling or deformity; no limitation of movement        LABS:      CBC Full  -  ( 17 Sep 2020 06:48 )  WBC Count : 14.09 K/uL  RBC Count : 4.83 M/uL  Hemoglobin : 9.8 g/dL  Hematocrit : 34.2 %  Platelet Count - Automated : 373 K/uL  Mean Cell Volume : 70.8 fl  Mean Cell Hemoglobin : 20.3 pg  Mean Cell Hemoglobin Concentration : 28.7 gm/dL  Auto Neutrophil # : 11.75 K/uL  Auto Lymphocyte # : 0.64 K/uL  Auto Monocyte # : 1.39 K/uL  Auto Eosinophil # : 0.21 K/uL  Auto Basophil # : 0.09 K/uL  Auto Neutrophil % : 83.0 %  Auto Lymphocyte % : 4.5 %  Auto Monocyte % : 9.8 %  Auto Eosinophil % : 1.5 %  Auto Basophil % : 0.6 %    09-17    131<L>  |  101  |  28<H>  ----------------------------<  132<H>  4.4   |  18<L>  |  0.89    Ca    8.8      17 Sep 2020 06:48    TPro  7.1  /  Alb  3.1<L>  /  TBili  0.7  /  DBili  x   /  AST  18  /  ALT  16  /  AlkPhos  163<H>  09-17          Urinalysis Basic - ( 17 Sep 2020 17:15 )    Color: Yellow / Appearance: Clear / SG: >=1.030 / pH: x  Gluc: x / Ketone: Trace mg/dL  / Bili: Small / Urobili: 0.2 E.U./dL   Blood: x / Protein: 100 mg/dL / Nitrite: NEGATIVE   Leuk Esterase: NEGATIVE / RBC: < 5 /HPF / WBC < 5 /HPF   Sq Epi: x / Non Sq Epi: 0-5 /HPF / Bacteria: Present /HPF              Culture Results:   No growth at 12 hours (09-17 @ 09:51)  Culture Results:   No growth at 12 hours (09-17 @ 09:24)      RADIOLOGY & ADDITIONAL STUDIES (The following images were personally reviewed):  Loja:                                     No  Urine output:                       adequate  DVT prophylaxis:                 Yes  Flattus:                                  Yes  Bowel movement:              No

## 2020-09-17 NOTE — PROGRESS NOTE ADULT - SUBJECTIVE AND OBJECTIVE BOX
INTERVAL HPI/OVERNIGHT EVENTS:  Tm 100.2 early am.  Has not felt well since yesterday.  Had rigors this morning.  Has urinary urgency without frequency, dysuria, hematuria, flank/back pain.  Still has weeping from LLE wound.  No drainage from abd wall incision.    CONSTITUTIONAL:  No night sweats  EYES:  No photophobia or visual changes  CARDIOVASCULAR:  No chest pain  RESPIRATORY:  No cough, wheezing, or SOB   GASTROINTESTINAL:  No nausea, vomiting, diarrhea, constipation, or abdominal pain  GENITOURINARY:  See above.  NEUROLOGIC:  No headache, lightheadedness      ANTIBIOTICS/RELEVANT:  Daptomycin 675 mg IV q24h (9/6-present)  Cefepime 2 g IV q8h (9/6-present)  Rifampin 300 mg po q12h      Vital Signs Last 24 Hrs  T(C): 37 (17 Sep 2020 12:17), Max: 37.9 (17 Sep 2020 05:59)  T(F): 98.6 (17 Sep 2020 12:17), Max: 100.2 (17 Sep 2020 05:59)  HR: 93 (17 Sep 2020 08:31) (75 - 110)  BP: 94/53 (17 Sep 2020 08:31) (94/53 - 114/69)  BP(mean): --  RR: 16 (17 Sep 2020 08:31) (16 - 18)  SpO2: 95% (17 Sep 2020 08:31) (95% - 99%)    PHYSICAL EXAM:  Constitutional:  Alert, nontoxic appearing  Eyes:  Sclerae anicteric, conjunctivae clear, PERRL  Ear/Nose/Throat:  No nasal exudate or sinus tenderness;  No buccal mucosal lesions, no pharyngeal erythema or exudate	  Neck:  Supple, no adenopathy  Respiratory:  Clear bilaterally  Cardiovascular:  RRR, S1S2, no murmur appreciated  Gastrointestinal:  RUQ ~1 cm incision with no surrounding erythema/edema/drainage.  Normoactive BS, soft, NT, no masses, guarding or rebound.  No HSM.  No CVAT  Extremities:  Trace pitting pretibial edema.  R knee warm.  Incision with smaller area of eschar.  L pretibial wound with fibrinogranular base, significant weeping, no surrounding erythema.  R foot wrapped by Podiatry (described 2nd toe ulcer with minimal erythema and edema, no drainage.)      LABS:                        9.8    14.09 )-----------( 373      ( 17 Sep 2020 06:48 )             34.2         09-17    131<L>  |  101  |  28<H>  ----------------------------<  132<H>  4.4   |  18<L>  |  0.89    Ca    8.8      17 Sep 2020 06:48    TPro  7.1  /  Alb  3.1<L>  /  TBili  0.7  /  DBili  x   /  AST  18  /  ALT  16  /  AlkPhos  163<H>  09-17      Sedimentation Rate, Erythrocyte: 28 mm/Hr (09.17.20 @ 06:48)  C-Reactive Protein, Serum: 3.61 mg/dL (09.17.20 @ 06:48)    Creatine Kinase, Serum: 60 U/L (09.14.20 @ 05:56)    MICROBIOLOGY:  Blood cultures:  9/1 X 2 - NG  Abd wall wound 9/3 - VRE. faecium and Pseudomonas aeruginosa    OR cultures R hip 9/4:  NGTD    Cultures from abd wall I&D 9/4 - VRE. faecium and Pseudomonas aeruginosa      RADIOLOGY & ADDITIONAL STUDIES:    < from: Xray Foot AP + Lateral, Bilateral (09.16.20 @ 14:16) >  Examination of the bilateral feet demonstrates severe arthrosis in the right first PIP joint, with valgus deformity. Calcaneal spurs no fractures. Internal fixation in the left first PIP joint. Soft tissue swelling in the plantar aspect of the left midfoot, without associated adjacent osseous abnormality. No fractures.    < end of copied text >

## 2020-09-17 NOTE — PROGRESS NOTE ADULT - SUBJECTIVE AND OBJECTIVE BOX
Pain Management Progress Note - [facility] [phone]      HPI: Patient seen and examined today. Patient with a history of knee pain, diabetic neuropathy, CHF, HTN, MRSA bacteremia, diverticulitis, COPD, hyperkalemia, chronic systolic CHF, osteoarthritis, s/p R Revision TKA and antibiotic spacer by Dr. Castro on 7/22, now presenting with R knee pain and swelling x3 days. Patient reports  R knee pain, pain managed with current pain medication regimen. Patient Axox3, denies any itchiness from Morphine Po.  LLE and R knee dressing intact,      Pain is _x__ sharp ____dull ___burning _x__achy ___ Intensity: ____ mild _x__mod x__severe     Location _x___surgical site ____cervical _____lumbar ____abd ____upper ext____lower ext    Worse with _x___activity _x___movement _____physical therapy___ Rest    Improved with _x___medication __x__rest ____physical therapy      BACItracin   Ointment  (ADM OVERRIDE)  mupirocin 2% Ointment  nystatin Cream  morphine  IR  morphine  IR  nystatin Powder  morphine  - Injectable  chlorhexidine 2% Cloths  insulin glargine Injectable (LANTUS)  insulin glargine Injectable (LANTUS)  insulin glargine Injectable (LANTUS)  pantoprazole  Injectable  metoclopramide Injectable  acetaminophen   Tablet ..  diphenhydrAMINE   Injectable  lactated ringers.  levothyroxine  sodium chloride 0.9% Bolus  gabapentin  gabapentin  gabapentin  sertraline  insulin glargine Injectable (LANTUS)  (ADM OVERRIDE)  morphine  IR  morphine  IR  morphine  - Injectable  HYDROmorphone   Tablet  HYDROmorphone   Tablet  (ADM OVERRIDE)  insulin lispro Injectable (HumaLOG)  diphenhydrAMINE  insulin glargine Injectable (LANTUS)  (Floorstock)  (Floorstock)  diazepam    Tablet  cefepime   IVPB  cefepime   IVPB  cefepime   IVPB  DAPTOmycin IVPB  insulin lispro Injectable (HumaLOG)  insulin glargine Injectable (LANTUS)  insulin lispro (HumaLOG) corrective regimen sliding scale  HYDROmorphone  Injectable  rifAMPin  glucagon  Injectable  dextrose 50% Injectable  dextrose 50% Injectable  dextrose 50% Injectable  dextrose 40% Gel  dextrose 5%.  lidocaine 2% Injectable  (ADM OVERRIDE)  lidocaine 2% Injectable  tamsulosin  rosuvastatin  gabapentin  cyclobenzaprine  spironolactone  atorvastatin  metoprolol succinate ER  prasugrel  digoxin     Tablet  vancomycin  IVPB  aspirin  chewable  HYDROmorphone  Injectable  senna  bisacodyl Suppository  polyethylene glycol 3350  ondansetron Injectable  aluminum hydroxide/magnesium hydroxide/simethicone Suspension  acetaminophen   Tablet ..  lactated ringers.  oxyCODONE    IR  oxyCODONE    IR  morphine  - Injectable  vancomycin  IVPB  vancomycin  IVPB  vancomycin  IVPB  oxyCODONE    IR  oxyCODONE    IR  rosuvastatin  lactated ringers.  chlorhexidine 2% Cloths  povidone iodine 5% Nasal Swab  spironolactone  diphenhydrAMINE  chlorhexidine 4% Liquid  sodium chloride 0.9% lock flush  cyclobenzaprine  gabapentin  HYDROmorphone  Injectable  HYDROmorphone   Tablet  HYDROmorphone   Tablet  HYDROmorphone  Injectable  glucagon  Injectable  dextrose 50% Injectable  dextrose 50% Injectable  dextrose 50% Injectable  dextrose 40% Gel  dextrose 5%.  insulin lispro (HumaLOG) corrective regimen sliding scale  tamsulosin  spironolactone  vancomycin  IVPB  pantoprazole    Tablet  silver sulfADIAZINE 1% Cream  metoprolol succinate ER  rifAMPin  prasugrel  atorvastatin  DULoxetine  tamsulosin Oral Tab/Cap - Peds  oxyCODONE    IR  oxyCODONE  ER Tablet  celecoxib  aspirin enteric coated  acetaminophen   Tablet ..  spironolactone Oral Tab/Cap - Peds  HYDROmorphone  Injectable      ROS: Const:  -___febrile   Eyes:___ENT:___CV: __-_chest pain  Resp: __-__sob  GI:_-__nausea __-_vomiting _x_ abd pain ___npo ___clears _x_full diet __bm  :___ Musk: _x__pain _-__spasm  Skin:___ Neuro:  _-__ozbonfff_-__lyubcbqdy_-__ numbness _-__weakness __x_paresth  Psych:_-_anxiety  Endo:___ Heme:___Allergy:_________, _x__all others reviewed and negative      PAST MEDICAL & SURGICAL HISTORY:  Diabetic neuropathy  STEMI (ST elevation myocardial infarction)  Diverticulitis  MRSA bacteremia  History of celiac disease  CHF (congestive heart failure): EF ~ 25%  HTN (hypertension)  Diabetes: on insulin pump  Blood clot due to device, implant, or graft: was on blood thinners  HLD (hyperlipidemia)  Osteoarthritis  Atherosclerosis of coronary artery: CAD (coronary artery disease)  Status post percutaneous transluminal coronary angioplasty: in 2012  History of open reduction and internal fixation (ORIF) procedure  Surgery, elective: Right shoulder  Surgery, elective: right knee wound debridement  S/P TKR (total knee replacement), right: with infection Mrsa   per pt he was cleared from MRSA infection  S/P CABG x 1: 2018  Stented coronary artery: 10/18 heart attack  INFERIOR WALL MI  Other postprocedural status: Fixation hardware in foot LEFT        09-17 @ 06:4891 mL/min/1.73M2          Hemoglobin: 9.8 g/dL (09-17 @ 06:48)  Hemoglobin: 8.5 g/dL (09-16 @ 07:42)        T(C): 37.2 (09-17-20 @ 14:25), Max: 37.9 (09-17-20 @ 05:59)  HR: 95 (09-17-20 @ 14:25) (75 - 110)  BP: 97/59 (09-17-20 @ 14:25) (94/53 - 114/69)  RR: 17 (09-17-20 @ 14:25) (16 - 18)  SpO2: 96% (09-17-20 @ 14:25) (95% - 99%)  Wt(kg): --        PHYSICAL EXAM:  Gen Appearance: x___no acute distress _x__appropriate        Neuro: _x__SILT feet____ EOM Intact Psych: AAOX__3, x___mood/affect appropriate        Eyes: __x_conjunctiva WNL  __x__ Pupils equal and round        ENT: x___ears and nose atraumatic_x__ Hearing grossly intact        Neck: _x__trachea midline, no visible masses ___thyroid without palpable mass    Resp: _x__Nml WOB____No tactile fremitus ___clear to auscultation    Cardio: ___extremities free from edema __x__pedal pulses palpable    GI/Abdomen: __x_soft ___x__ Nontender___x___Nondistended_____HSM    Lymphatic: ___no palpable nodes in neck  ___no palpable nodes calves and feet    Skin/Wound: ___Incision, _x__Dressing c/d/i,   ____surrounding tissues soft,  ___drain/chest tube present____    Muscular: EHL _4__/5  Gastrocnemius_4__/5    ___absent clubbing/cyanosis          ASSESSMENT: This is a 65y old Male with a history of knee pain, diabetic neuropathy, CHF, HTN, MRSA bacteremia, diverticulitis, COPD, hyperkalemia, chronic systolic CHF, osteoarthritis,  s/p R Revision TKA and antibiotic spacer by Dr. Castro on 7/22, now presenting with R knee pain and swelling x3 days, pain managed with current pain medication regimen.       Recommended Treatment PLAN:  1. Morphine IR 15-30mg Po Q4h prn moderate to severe pain  2. Flexeril 5mg PO Q8h prn muscle spasms  3. Gabapentin 100mg PO TID  4. Morphine 2mg IVP Q4h prn breakthrough pain  Plan discussed with Dr. Max at bedside

## 2020-09-17 NOTE — PROGRESS NOTE ADULT - SUBJECTIVE AND OBJECTIVE BOX
Ortho Note    Pt seen and examined. C/o general malaise. Denies feeling feverish, chills.  Denies CP, SOB, N/V, numbness/tingling     Vital Signs Last 24 Hrs  T(C): 37.8 (09-17-20 @ 08:31), Max: 37.9 (09-17-20 @ 05:59)  T(F): 100.1 (09-17-20 @ 08:31), Max: 100.2 (09-17-20 @ 05:59)  HR: 93 (09-17-20 @ 08:31) (93 - 110)  BP: 94/53 (09-17-20 @ 08:31) (94/53 - 114/69)  BP(mean): --  RR: 16 (09-17-20 @ 08:31) (16 - 18)  SpO2: 95% (09-17-20 @ 08:31) (95% - 95%)  AVSS    RLE:  appearance- R knee and hip incisions healing; no drainage or erythema; R foot with mild erythema; examined with podiatry present and wounds appear WNL  DSG- R hip gauze/ teg CDI; R knee gauze/ teg CDI; kerlix around recently debrided R foot (diabetic ulcers)  Pulses: +DP;  feet cool to touch; at baseline; no edema  Sensation: Sensation intact to baseline (diminished sensation- h/o severe neuropathy; follows with vascular Dr. Malloy outpatient)  Motor: 5/5 EHL/FHL/TA/GS    LLE- open healing vascular wound; wound bed pink; no erythema, odor, or purulent drainage ; covered with WTD dsg  Pulses: feet cool to touch; at baseline; no edema  Sensation: SILT to baseline (diminished sensation- h/o severe neuropathy; follows with vascular Dr. Malloy outpatient)  Motor: 5/5 EHL/FHL/TA/GS                        9.8    14.09 )-----------( 373      ( 17 Sep 2020 06:48 )             34.2   17 Sep 2020 06:48    131    |  101    |  28     ----------------------------<  132    4.4     |  18     |  0.89     Ca    8.8        17 Sep 2020 06:48    TPro  7.1    /  Alb  3.1    /  TBili  0.7    /  DBili  x      /  AST  18     /  ALT  16     /  AlkPhos  163    17 Sep 2020 06:48      A/P: 65yMale admitted for R knee pain, drainage s/p right knee arthroscopic I+D 7/17, right knee explant and abx spacer 7/22 with Dr. Castro;  and debridement of LLE wound with wound vac placement on 7/30 by Dr. Bowen. Was original d/c'd home on 8/10/20 with PICC and vancomycin IV q12h + rifampin.  Readmitted, and now s/p R hip exchange of hardware on 09/04/2020  - T100.1- 100.2 this AM with HR in 90s-110. Denies chills, fever; but reports general malaise. R hip, RUQ, R knee, R foot, and LLE vascular wounds without obvious signs of infection, and appear to be healing well. BC x 2 drawn, continue to follow- up results; continue abx regimen and appreciate ID recs; frequent monitoring of VS, continue telemetry monitoring.  - F/u UA for c/o some burning with urination  - H+H 9.8/ 34.2 - Tolerated transfusion of 1u PRBCs well. No c/o back pain, chills, fever.  Patient will go to OR for R knee spacer exchange + gastroc flap with Plastics Dr. Bran; close to  goal H+H prior to OR 10.0/30.0 for anticipated post-op drop.  - Pain Control- appreciate pain management recs  - DVT ppx: ASA + effient (home med)  - PT, WBS: WBAT  - OOB for meals, aggressive I/S  - continue bowel regimen  - appreciate ID recs- Continue dapto, cefepime, rifampin; check CPK 9/21 with dapto; f/o CBC with diff and CMP (elevated liver enzymes in past with rifampin)  - appreciate internal medicine, and cardiology recs (seen by Dr. Caldera today- plan to continue home meds)  - appreciate plastics recs- continue WTD dressings on LLE vascular wound qd and PRN  - appreciate gen surg recs- will need cholecystectomy (elective) in near future, currently no abd pain, n/v; dressing changes as per nursing; no acute drainage from RUQ site  - appreciate psych recs- currently feels well on regimen today, will reconsult as needed  - podiatry consulted for R foot diabetic ulcers x2- debrided at bedside; DSGs changed by podiatry this AM, continue bactroban  - dispo: OR Friday for R knee spacer exchange and gastroc flap    Ortho Pager [phone]

## 2020-09-17 NOTE — PROGRESS NOTE ADULT - SUBJECTIVE AND OBJECTIVE BOX
Patient is a 65y old  Male who presents with a chief complaint of R Knee Swelling (17 Sep 2020 09:43)      INTERVAL HPI/ OVERNIGHT EVENTS: Patient seen and examined bedside. Resting comfortably in bed. Denies f/c/n/v.       LABS                        9.8    14.09 )-----------( 373      ( 17 Sep 2020 06:48 )             34.2     09-17    131<L>  |  101  |  28<H>  ----------------------------<  132<H>  4.4   |  18<L>  |  0.89    Ca    8.8      17 Sep 2020 06:48    TPro  7.1  /  Alb  3.1<L>  /  TBili  0.7  /  DBili  x   /  AST  18  /  ALT  16  /  AlkPhos  163<H>  09-17      ESR: 28  CRP: --  09-17 @ 06:48    ICU Vital Signs Last 24 Hrs  T(C): 37.8 (17 Sep 2020 08:31), Max: 37.9 (17 Sep 2020 05:59)  T(F): 100.1 (17 Sep 2020 08:31), Max: 100.2 (17 Sep 2020 05:59)  HR: 93 (17 Sep 2020 08:31) (75 - 110)  BP: 94/53 (17 Sep 2020 08:31) (94/53 - 118/59)  BP(mean): --  ABP: --  ABP(mean): --  RR: 16 (17 Sep 2020 08:31) (16 - 18)  SpO2: 95% (17 Sep 2020 08:31) (95% - 99%)      RADIOLOGY    MICROBIOLOGY    PHYSICAL EXAM  Lower Extremity Focused:  Vasc: DP/PT 1-4 b/l, +2 nisa malleolar and dorsum of the foot pitting edema b/l, CFT <3sec b/l  Derm: Right foot - 2nd digit ulcer at tip of toe, no drainage, no malodor, hallux maceration improving; small circular lesions present throughout his leg d/t to injuries related to his cast, Tinea Pedia b/l, onychomycosis.   Neuro: Protective sensations not intact from the distal tibia down (stocking distribution)  MSK: Partial amp of digits 2 and 3 on L foot, hammertoes on digits 2-5 on R foot, Hallux rigidus b/l, Patient is a 65y old  Male who presents with a chief complaint of R Knee Swelling (17 Sep 2020 09:43)      INTERVAL HPI/ OVERNIGHT EVENTS: Patient seen and examined bedside. Resting comfortably in bed. Denies f/c/n/v.       LABS                        9.8    14.09 )-----------( 373      ( 17 Sep 2020 06:48 )             34.2     09-17    131<L>  |  101  |  28<H>  ----------------------------<  132<H>  4.4   |  18<L>  |  0.89    Ca    8.8      17 Sep 2020 06:48    TPro  7.1  /  Alb  3.1<L>  /  TBili  0.7  /  DBili  x   /  AST  18  /  ALT  16  /  AlkPhos  163<H>  09-17      ESR: 28  CRP: --  09-17 @ 06:48    ICU Vital Signs Last 24 Hrs  T(C): 37.8 (17 Sep 2020 08:31), Max: 37.9 (17 Sep 2020 05:59)  T(F): 100.1 (17 Sep 2020 08:31), Max: 100.2 (17 Sep 2020 05:59)  HR: 93 (17 Sep 2020 08:31) (75 - 110)  BP: 94/53 (17 Sep 2020 08:31) (94/53 - 118/59)  BP(mean): --  ABP: --  ABP(mean): --  RR: 16 (17 Sep 2020 08:31) (16 - 18)  SpO2: 95% (17 Sep 2020 08:31) (95% - 99%)      RADIOLOGY  EXAM:  XR FOOT BILATERAL-2 VIEWS                        PROCEDURE DATE:  09/16/2020        INTERPRETATION:  LEFT AND RIGHT FOOT  INDICATION: Ulcers of the foot  Examination of the bilateral feet demonstrates severe arthrosis in the right first PIP joint, with valgus deformity. Calcaneal spurs no fractures. Internal fixation in the left first PIP joint. Soft tissue swelling in the plantar aspect of the left midfoot, without associated adjacent osseous abnormality. No fractures.      Thank you for the opportunity to participate in the care of this patient.        MICROBIOLOGY    PHYSICAL EXAM  Lower Extremity Focused:  Vasc: DP/PT 1-4 b/l, +2 nisa malleolar and dorsum of the foot pitting edema b/l, CFT <3sec b/l  Derm: Right foot - 2nd digit ulcer at tip of toe, no drainage, no malodor, hallux maceration improving; small circular lesions present throughout his leg d/t to injuries related to his cast, Tinea Pedia b/l, onychomycosis.   Neuro: Protective sensations not intact from the distal tibia down (stocking distribution)  MSK: Partial amp of digits 2 and 3 on L foot, hammertoes on digits 2-5 on R foot, Hallux rigidus b/l,

## 2020-09-17 NOTE — PROGRESS NOTE ADULT - ASSESSMENT
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  He has completed 6 weeks of vancomycin & rifampin.  He is s/p R hip exchange of hardware on 9/4 - OR cultures NGTD.  I&D of abd wall done 9/4 - cultures growing Pseudomonas and Enterococcus faecium. Gallium SPECT done 9/10 showed minimal uptake from GB fossa to soft tissue and was interpreted by surgery as a negative study. Now planned for R knee spacer exchange, muscle flap and L shin ulcer debridement on 9/18.  Today with new low grade temp increase and leukocytosis (WBC 14<--7K) with neutrophil predominance.  His only new localizing symptom is urinary urgency - would not expect lower tract infection to give fever/leukocytosis.  Other possible sources are his knee, though would not expect him to suddenly become febrile from that either, and PICC.  He could also develop bacteremia with skin as portal (L shin wound) but wound itself does not appear infected).  No evidence for lung or abd source.  Suggest:  - Please send blood cultures X 2 sets  - UA, urine C&S  - Continue daptomycin  675 mg IV q24h  - Continue cefepime 2 g IV q8h  - Continue rifampin 300 mg po q12h.  Recommendations discussed with primary team.  Will follow with you - team 2.

## 2020-09-17 NOTE — PROGRESS NOTE ADULT - ATTENDING COMMENTS
Patient seen and examined with house-staff during bedside rounds.  Resident note read, including vitals, physical findings, laboratory data, and radiological reports.   Revisions included below.  Direct personal management at bed side and extensive interpretation of the data.  Plan was outlined and discussed in details with the housestaff.  Decision making of high complexity  Action taken for acute disease activity to reflect the level of care provided:  - medication reconciliation  - review laboratory data  The patient developed fever and the white count increased.  The clinical exam was unremarkable.  The urine analysis could be contaminant.  Blood culture was drawn.  The infection most likely source is the right knee.  I discussed the case with orthopedic surgery and the patient is going for the OR tomorrow.  The blood sugar increased and continue current insulin level

## 2020-09-18 ENCOUNTER — RESULT REVIEW (OUTPATIENT)
Age: 65
End: 2020-09-18

## 2020-09-18 LAB
ALBUMIN SERPL ELPH-MCNC: 2.3 G/DL — LOW (ref 3.3–5)
ALBUMIN SERPL ELPH-MCNC: 2.9 G/DL — LOW (ref 3.3–5)
ALP SERPL-CCNC: 126 U/L — HIGH (ref 40–120)
ALP SERPL-CCNC: 149 U/L — HIGH (ref 40–120)
ALT FLD-CCNC: 15 U/L — SIGNIFICANT CHANGE UP (ref 10–45)
ALT FLD-CCNC: 20 U/L — SIGNIFICANT CHANGE UP (ref 10–45)
ANION GAP SERPL CALC-SCNC: 10 MMOL/L — SIGNIFICANT CHANGE UP (ref 5–17)
ANION GAP SERPL CALC-SCNC: 9 MMOL/L — SIGNIFICANT CHANGE UP (ref 5–17)
APTT BLD: 26.9 SEC — LOW (ref 27.5–35.5)
AST SERPL-CCNC: 21 U/L — SIGNIFICANT CHANGE UP (ref 10–40)
AST SERPL-CCNC: 24 U/L — SIGNIFICANT CHANGE UP (ref 10–40)
BASE EXCESS BLDA CALC-SCNC: -6.8 MMOL/L — LOW (ref -2–3)
BASE EXCESS BLDA CALC-SCNC: -7 MMOL/L — LOW (ref -2–3)
BASE EXCESS BLDA CALC-SCNC: -7.9 MMOL/L — LOW (ref -2–3)
BASE EXCESS BLDA CALC-SCNC: -8.1 MMOL/L — LOW (ref -2–3)
BASOPHILS # BLD AUTO: 0.08 K/UL — SIGNIFICANT CHANGE UP (ref 0–0.2)
BASOPHILS # BLD AUTO: 0.13 K/UL — SIGNIFICANT CHANGE UP (ref 0–0.2)
BASOPHILS NFR BLD AUTO: 0.8 % — SIGNIFICANT CHANGE UP (ref 0–2)
BASOPHILS NFR BLD AUTO: 0.9 % — SIGNIFICANT CHANGE UP (ref 0–2)
BILIRUB SERPL-MCNC: 0.6 MG/DL — SIGNIFICANT CHANGE UP (ref 0.2–1.2)
BILIRUB SERPL-MCNC: 1 MG/DL — SIGNIFICANT CHANGE UP (ref 0.2–1.2)
BLD GP AB SCN SERPL QL: NEGATIVE — SIGNIFICANT CHANGE UP
BUN SERPL-MCNC: 27 MG/DL — HIGH (ref 7–23)
BUN SERPL-MCNC: 27 MG/DL — HIGH (ref 7–23)
CA-I BLDA-SCNC: 1.03 MMOL/L — LOW (ref 1.12–1.3)
CA-I BLDA-SCNC: 1.06 MMOL/L — LOW (ref 1.12–1.3)
CA-I BLDA-SCNC: 1.09 MMOL/L — LOW (ref 1.12–1.3)
CALCIUM SERPL-MCNC: 7.7 MG/DL — LOW (ref 8.4–10.5)
CALCIUM SERPL-MCNC: 8.6 MG/DL — SIGNIFICANT CHANGE UP (ref 8.4–10.5)
CHLORIDE SERPL-SCNC: 104 MMOL/L — SIGNIFICANT CHANGE UP (ref 96–108)
CHLORIDE SERPL-SCNC: 109 MMOL/L — HIGH (ref 96–108)
CO2 SERPL-SCNC: 16 MMOL/L — LOW (ref 22–31)
CO2 SERPL-SCNC: 20 MMOL/L — LOW (ref 22–31)
COHGB MFR BLDA: 0.4 % — SIGNIFICANT CHANGE UP
COHGB MFR BLDA: 0.5 % — SIGNIFICANT CHANGE UP
COHGB MFR BLDA: 0.5 % — SIGNIFICANT CHANGE UP
CREAT SERPL-MCNC: 0.99 MG/DL — SIGNIFICANT CHANGE UP (ref 0.5–1.3)
CREAT SERPL-MCNC: 1.02 MG/DL — SIGNIFICANT CHANGE UP (ref 0.5–1.3)
CRP SERPL-MCNC: 6.96 MG/DL — HIGH (ref 0–0.4)
EOSINOPHIL # BLD AUTO: 0.13 K/UL — SIGNIFICANT CHANGE UP (ref 0–0.5)
EOSINOPHIL # BLD AUTO: 0.33 K/UL — SIGNIFICANT CHANGE UP (ref 0–0.5)
EOSINOPHIL NFR BLD AUTO: 0.9 % — SIGNIFICANT CHANGE UP (ref 0–6)
EOSINOPHIL NFR BLD AUTO: 3.4 % — SIGNIFICANT CHANGE UP (ref 0–6)
ERYTHROCYTE [SEDIMENTATION RATE] IN BLOOD: 16 MM/HR — SIGNIFICANT CHANGE UP
GAS PNL BLDA: SIGNIFICANT CHANGE UP
GAS PNL BLDA: SIGNIFICANT CHANGE UP
GLUCOSE BLDC GLUCOMTR-MCNC: 100 MG/DL — HIGH (ref 70–99)
GLUCOSE BLDC GLUCOMTR-MCNC: 75 MG/DL — SIGNIFICANT CHANGE UP (ref 70–99)
GLUCOSE BLDC GLUCOMTR-MCNC: 83 MG/DL — SIGNIFICANT CHANGE UP (ref 70–99)
GLUCOSE BLDC GLUCOMTR-MCNC: 88 MG/DL — SIGNIFICANT CHANGE UP (ref 70–99)
GLUCOSE BLDC GLUCOMTR-MCNC: 90 MG/DL — SIGNIFICANT CHANGE UP (ref 70–99)
GLUCOSE BLDC GLUCOMTR-MCNC: 97 MG/DL — SIGNIFICANT CHANGE UP (ref 70–99)
GLUCOSE SERPL-MCNC: 88 MG/DL — SIGNIFICANT CHANGE UP (ref 70–99)
GLUCOSE SERPL-MCNC: 90 MG/DL — SIGNIFICANT CHANGE UP (ref 70–99)
GRAM STN FLD: SIGNIFICANT CHANGE UP
HCO3 BLDA-SCNC: 17 MMOL/L — LOW (ref 21–28)
HCO3 BLDA-SCNC: 17 MMOL/L — LOW (ref 21–28)
HCO3 BLDA-SCNC: 18 MMOL/L — LOW (ref 21–28)
HCO3 BLDA-SCNC: 19 MMOL/L — LOW (ref 21–28)
HCT VFR BLD CALC: 30.1 % — LOW (ref 39–50)
HCT VFR BLD CALC: 33 % — LOW (ref 39–50)
HGB BLD-MCNC: 9 G/DL — LOW (ref 13–17)
HGB BLD-MCNC: 9.2 G/DL — LOW (ref 13–17)
HGB BLDA-MCNC: 10.1 G/DL — LOW (ref 13–17)
HGB BLDA-MCNC: 9.1 G/DL — LOW (ref 13–17)
HGB BLDA-MCNC: 9.8 G/DL — LOW (ref 13–17)
IMM GRANULOCYTES NFR BLD AUTO: 0.4 % — SIGNIFICANT CHANGE UP (ref 0–1.5)
INR BLD: 1.79 — HIGH (ref 0.88–1.16)
LACTATE SERPL-SCNC: 1.4 MMOL/L — SIGNIFICANT CHANGE UP (ref 0.5–2)
LYMPHOCYTES # BLD AUTO: 0.26 K/UL — LOW (ref 1–3.3)
LYMPHOCYTES # BLD AUTO: 1.15 K/UL — SIGNIFICANT CHANGE UP (ref 1–3.3)
LYMPHOCYTES # BLD AUTO: 1.8 % — LOW (ref 13–44)
LYMPHOCYTES # BLD AUTO: 12 % — LOW (ref 13–44)
MAGNESIUM SERPL-MCNC: 1.7 MG/DL — SIGNIFICANT CHANGE UP (ref 1.6–2.6)
MCHC RBC-ENTMCNC: 20.2 PG — LOW (ref 27–34)
MCHC RBC-ENTMCNC: 22.1 PG — LOW (ref 27–34)
MCHC RBC-ENTMCNC: 27.9 GM/DL — LOW (ref 32–36)
MCHC RBC-ENTMCNC: 29.9 GM/DL — LOW (ref 32–36)
MCV RBC AUTO: 72.4 FL — LOW (ref 80–100)
MCV RBC AUTO: 73.8 FL — LOW (ref 80–100)
METHGB MFR BLDA: 0.1 % — SIGNIFICANT CHANGE UP
METHGB MFR BLDA: 0.2 % — SIGNIFICANT CHANGE UP
METHGB MFR BLDA: 0.3 % — SIGNIFICANT CHANGE UP
MONOCYTES # BLD AUTO: 0.88 K/UL — SIGNIFICANT CHANGE UP (ref 0–0.9)
MONOCYTES # BLD AUTO: 1.45 K/UL — HIGH (ref 0–0.9)
MONOCYTES NFR BLD AUTO: 15.1 % — HIGH (ref 2–14)
MONOCYTES NFR BLD AUTO: 6.2 % — SIGNIFICANT CHANGE UP (ref 2–14)
NEUTROPHILS # BLD AUTO: 12.87 K/UL — HIGH (ref 1.8–7.4)
NEUTROPHILS # BLD AUTO: 6.55 K/UL — SIGNIFICANT CHANGE UP (ref 1.8–7.4)
NEUTROPHILS NFR BLD AUTO: 68.3 % — SIGNIFICANT CHANGE UP (ref 43–77)
NEUTROPHILS NFR BLD AUTO: 89.3 % — HIGH (ref 43–77)
NRBC # BLD: 0 /100 WBCS — SIGNIFICANT CHANGE UP (ref 0–0)
NRBC # BLD: SIGNIFICANT CHANGE UP /100 WBCS (ref 0–0)
O2 CT VFR BLDA CALC: 13.3 ML/DL — SIGNIFICANT CHANGE UP (ref 15–23)
O2 CT VFR BLDA CALC: 14.3 ML/DL — SIGNIFICANT CHANGE UP (ref 15–23)
O2 CT VFR BLDA CALC: 14.8 ML/DL — SIGNIFICANT CHANGE UP (ref 15–23)
OXYHGB MFR BLDA: 99 % — SIGNIFICANT CHANGE UP (ref 94–100)
PCO2 BLDA: 32 MMHG — LOW (ref 35–48)
PCO2 BLDA: 32 MMHG — LOW (ref 35–48)
PCO2 BLDA: 34 MMHG — LOW (ref 35–48)
PCO2 BLDA: 41 MMHG — SIGNIFICANT CHANGE UP (ref 35–48)
PH BLDA: 7.29 — LOW (ref 7.35–7.45)
PH BLDA: 7.34 — LOW (ref 7.35–7.45)
PHOSPHATE SERPL-MCNC: 3.1 MG/DL — SIGNIFICANT CHANGE UP (ref 2.5–4.5)
PLATELET # BLD AUTO: 254 K/UL — SIGNIFICANT CHANGE UP (ref 150–400)
PLATELET # BLD AUTO: 343 K/UL — SIGNIFICANT CHANGE UP (ref 150–400)
PO2 BLDA: 258 MMHG — HIGH (ref 83–108)
PO2 BLDA: 264 MMHG — HIGH (ref 83–108)
PO2 BLDA: 278 MMHG — HIGH (ref 83–108)
PO2 BLDA: 76 MMHG — LOW (ref 83–108)
POTASSIUM BLDA-SCNC: 4.3 MMOL/L — SIGNIFICANT CHANGE UP (ref 3.5–4.9)
POTASSIUM BLDA-SCNC: 4.5 MMOL/L — SIGNIFICANT CHANGE UP (ref 3.5–4.9)
POTASSIUM BLDA-SCNC: 4.7 MMOL/L — SIGNIFICANT CHANGE UP (ref 3.5–4.9)
POTASSIUM SERPL-MCNC: 4.2 MMOL/L — SIGNIFICANT CHANGE UP (ref 3.5–5.3)
POTASSIUM SERPL-MCNC: 4.4 MMOL/L — SIGNIFICANT CHANGE UP (ref 3.5–5.3)
POTASSIUM SERPL-SCNC: 4.2 MMOL/L — SIGNIFICANT CHANGE UP (ref 3.5–5.3)
POTASSIUM SERPL-SCNC: 4.4 MMOL/L — SIGNIFICANT CHANGE UP (ref 3.5–5.3)
PROT SERPL-MCNC: 5.2 G/DL — LOW (ref 6–8.3)
PROT SERPL-MCNC: 6.5 G/DL — SIGNIFICANT CHANGE UP (ref 6–8.3)
PROTHROM AB SERPL-ACNC: 20.9 SEC — HIGH (ref 10.6–13.6)
RBC # BLD: 4.08 M/UL — LOW (ref 4.2–5.8)
RBC # BLD: 4.56 M/UL — SIGNIFICANT CHANGE UP (ref 4.2–5.8)
RBC # FLD: 20.7 % — HIGH (ref 10.3–14.5)
RBC # FLD: 21.6 % — HIGH (ref 10.3–14.5)
RH IG SCN BLD-IMP: POSITIVE — SIGNIFICANT CHANGE UP
SAO2 % BLDA: 100 % — SIGNIFICANT CHANGE UP (ref 95–100)
SAO2 % BLDA: 94 % — LOW (ref 95–100)
SODIUM BLDA-SCNC: 130 MMOL/L — LOW (ref 138–146)
SODIUM BLDA-SCNC: 130 MMOL/L — LOW (ref 138–146)
SODIUM BLDA-SCNC: 131 MMOL/L — LOW (ref 138–146)
SODIUM SERPL-SCNC: 133 MMOL/L — LOW (ref 135–145)
SODIUM SERPL-SCNC: 135 MMOL/L — SIGNIFICANT CHANGE UP (ref 135–145)
SPECIMEN SOURCE: SIGNIFICANT CHANGE UP
WBC # BLD: 14.27 K/UL — HIGH (ref 3.8–10.5)
WBC # BLD: 9.6 K/UL — SIGNIFICANT CHANGE UP (ref 3.8–10.5)
WBC # FLD AUTO: 14.27 K/UL — HIGH (ref 3.8–10.5)
WBC # FLD AUTO: 9.6 K/UL — SIGNIFICANT CHANGE UP (ref 3.8–10.5)

## 2020-09-18 PROCEDURE — 88331 PATH CONSLTJ SURG 1 BLK 1SPC: CPT | Mod: 26

## 2020-09-18 PROCEDURE — 73560 X-RAY EXAM OF KNEE 1 OR 2: CPT | Mod: 26,RT

## 2020-09-18 PROCEDURE — 88309 TISSUE EXAM BY PATHOLOGIST: CPT | Mod: 26

## 2020-09-18 PROCEDURE — 99232 SBSQ HOSP IP/OBS MODERATE 35: CPT | Mod: GC

## 2020-09-18 PROCEDURE — 11983 REMOVE/INSERT DRUG IMPLANT: CPT | Mod: 78

## 2020-09-18 PROCEDURE — 88311 DECALCIFY TISSUE: CPT | Mod: 26

## 2020-09-18 PROCEDURE — 88304 TISSUE EXAM BY PATHOLOGIST: CPT | Mod: 26

## 2020-09-18 PROCEDURE — 88305 TISSUE EXAM BY PATHOLOGIST: CPT | Mod: 26

## 2020-09-18 PROCEDURE — 99223 1ST HOSP IP/OBS HIGH 75: CPT | Mod: GC

## 2020-09-18 PROCEDURE — 71045 X-RAY EXAM CHEST 1 VIEW: CPT | Mod: 26

## 2020-09-18 RX ORDER — DEXTROSE 50 % IN WATER 50 %
25 SYRINGE (ML) INTRAVENOUS ONCE
Refills: 0 | Status: DISCONTINUED | OUTPATIENT
Start: 2020-09-18 | End: 2020-10-03

## 2020-09-18 RX ORDER — CYCLOBENZAPRINE HYDROCHLORIDE 10 MG/1
5 TABLET, FILM COATED ORAL THREE TIMES A DAY
Refills: 0 | Status: DISCONTINUED | OUTPATIENT
Start: 2020-09-18 | End: 2020-09-21

## 2020-09-18 RX ORDER — MORPHINE SULFATE 50 MG/1
15 CAPSULE, EXTENDED RELEASE ORAL EVERY 4 HOURS
Refills: 0 | Status: DISCONTINUED | OUTPATIENT
Start: 2020-09-18 | End: 2020-09-25

## 2020-09-18 RX ORDER — GLUCAGON INJECTION, SOLUTION 0.5 MG/.1ML
1 INJECTION, SOLUTION SUBCUTANEOUS ONCE
Refills: 0 | Status: DISCONTINUED | OUTPATIENT
Start: 2020-09-18 | End: 2020-10-03

## 2020-09-18 RX ORDER — HYDROMORPHONE HYDROCHLORIDE 2 MG/ML
0.5 INJECTION INTRAMUSCULAR; INTRAVENOUS; SUBCUTANEOUS
Refills: 0 | Status: DISCONTINUED | OUTPATIENT
Start: 2020-09-18 | End: 2020-09-19

## 2020-09-18 RX ORDER — MORPHINE SULFATE 50 MG/1
2 CAPSULE, EXTENDED RELEASE ORAL EVERY 4 HOURS
Refills: 0 | Status: DISCONTINUED | OUTPATIENT
Start: 2020-09-18 | End: 2020-09-22

## 2020-09-18 RX ORDER — SODIUM CHLORIDE 9 MG/ML
1000 INJECTION, SOLUTION INTRAVENOUS
Refills: 0 | Status: DISCONTINUED | OUTPATIENT
Start: 2020-09-18 | End: 2020-10-03

## 2020-09-18 RX ORDER — INSULIN LISPRO 100/ML
VIAL (ML) SUBCUTANEOUS
Refills: 0 | Status: DISCONTINUED | OUTPATIENT
Start: 2020-09-18 | End: 2020-10-03

## 2020-09-18 RX ORDER — GABAPENTIN 400 MG/1
100 CAPSULE ORAL THREE TIMES A DAY
Refills: 0 | Status: DISCONTINUED | OUTPATIENT
Start: 2020-09-18 | End: 2020-09-21

## 2020-09-18 RX ORDER — MAGNESIUM SULFATE 500 MG/ML
1 VIAL (ML) INJECTION ONCE
Refills: 0 | Status: COMPLETED | OUTPATIENT
Start: 2020-09-18 | End: 2020-09-18

## 2020-09-18 RX ORDER — SODIUM CHLORIDE 9 MG/ML
1000 INJECTION, SOLUTION INTRAVENOUS
Refills: 0 | Status: DISCONTINUED | OUTPATIENT
Start: 2020-09-18 | End: 2020-09-21

## 2020-09-18 RX ORDER — CEFEPIME 1 G/1
2000 INJECTION, POWDER, FOR SOLUTION INTRAMUSCULAR; INTRAVENOUS EVERY 8 HOURS
Refills: 0 | Status: DISCONTINUED | OUTPATIENT
Start: 2020-09-19 | End: 2020-09-25

## 2020-09-18 RX ORDER — DEXTROSE 50 % IN WATER 50 %
15 SYRINGE (ML) INTRAVENOUS ONCE
Refills: 0 | Status: DISCONTINUED | OUTPATIENT
Start: 2020-09-18 | End: 2020-10-03

## 2020-09-18 RX ORDER — SPIRONOLACTONE 25 MG/1
25 TABLET, FILM COATED ORAL DAILY
Refills: 0 | Status: DISCONTINUED | OUTPATIENT
Start: 2020-09-19 | End: 2020-10-03

## 2020-09-18 RX ORDER — MORPHINE SULFATE 50 MG/1
30 CAPSULE, EXTENDED RELEASE ORAL EVERY 4 HOURS
Refills: 0 | Status: DISCONTINUED | OUTPATIENT
Start: 2020-09-18 | End: 2020-09-25

## 2020-09-18 RX ORDER — DIGOXIN 250 MCG
0.12 TABLET ORAL DAILY
Refills: 0 | Status: DISCONTINUED | OUTPATIENT
Start: 2020-09-19 | End: 2020-10-03

## 2020-09-18 RX ORDER — BUPIVACAINE 13.3 MG/ML
20 INJECTION, SUSPENSION, LIPOSOMAL INFILTRATION ONCE
Refills: 0 | Status: DISCONTINUED | OUTPATIENT
Start: 2020-09-18 | End: 2020-09-18

## 2020-09-18 RX ORDER — DEXTROSE 50 % IN WATER 50 %
12.5 SYRINGE (ML) INTRAVENOUS ONCE
Refills: 0 | Status: DISCONTINUED | OUTPATIENT
Start: 2020-09-18 | End: 2020-10-03

## 2020-09-18 RX ORDER — METOPROLOL TARTRATE 50 MG
25 TABLET ORAL DAILY
Refills: 0 | Status: DISCONTINUED | OUTPATIENT
Start: 2020-09-19 | End: 2020-10-03

## 2020-09-18 RX ORDER — DAPTOMYCIN 500 MG/10ML
650 INJECTION, POWDER, LYOPHILIZED, FOR SOLUTION INTRAVENOUS EVERY 24 HOURS
Refills: 0 | Status: DISCONTINUED | OUTPATIENT
Start: 2020-09-19 | End: 2020-09-28

## 2020-09-18 RX ADMIN — MORPHINE SULFATE 30 MILLIGRAM(S): 50 CAPSULE, EXTENDED RELEASE ORAL at 06:41

## 2020-09-18 RX ADMIN — CEFEPIME 100 MILLIGRAM(S): 1 INJECTION, POWDER, FOR SOLUTION INTRAMUSCULAR; INTRAVENOUS at 07:48

## 2020-09-18 RX ADMIN — GABAPENTIN 100 MILLIGRAM(S): 400 CAPSULE ORAL at 06:13

## 2020-09-18 RX ADMIN — HYDROMORPHONE HYDROCHLORIDE 0.5 MILLIGRAM(S): 2 INJECTION INTRAMUSCULAR; INTRAVENOUS; SUBCUTANEOUS at 22:03

## 2020-09-18 RX ADMIN — SPIRONOLACTONE 25 MILLIGRAM(S): 25 TABLET, FILM COATED ORAL at 06:13

## 2020-09-18 RX ADMIN — GABAPENTIN 100 MILLIGRAM(S): 400 CAPSULE ORAL at 22:40

## 2020-09-18 RX ADMIN — MORPHINE SULFATE 30 MILLIGRAM(S): 50 CAPSULE, EXTENDED RELEASE ORAL at 07:31

## 2020-09-18 RX ADMIN — Medication 50 MICROGRAM(S): at 06:13

## 2020-09-18 RX ADMIN — DAPTOMYCIN 127 MILLIGRAM(S): 500 INJECTION, POWDER, LYOPHILIZED, FOR SOLUTION INTRAVENOUS at 03:04

## 2020-09-18 RX ADMIN — Medication 0.12 MILLIGRAM(S): at 06:13

## 2020-09-18 RX ADMIN — MORPHINE SULFATE 30 MILLIGRAM(S): 50 CAPSULE, EXTENDED RELEASE ORAL at 23:32

## 2020-09-18 RX ADMIN — MORPHINE SULFATE 30 MILLIGRAM(S): 50 CAPSULE, EXTENDED RELEASE ORAL at 23:04

## 2020-09-18 RX ADMIN — Medication 100 GRAM(S): at 22:40

## 2020-09-18 RX ADMIN — HYDROMORPHONE HYDROCHLORIDE 0.5 MILLIGRAM(S): 2 INJECTION INTRAMUSCULAR; INTRAVENOUS; SUBCUTANEOUS at 22:18

## 2020-09-18 RX ADMIN — Medication 1 APPLICATION(S): at 06:13

## 2020-09-18 RX ADMIN — Medication 25 MILLIGRAM(S): at 02:02

## 2020-09-18 RX ADMIN — INSULIN GLARGINE 10 UNIT(S): 100 INJECTION, SOLUTION SUBCUTANEOUS at 00:02

## 2020-09-18 NOTE — PROGRESS NOTE ADULT - SUBJECTIVE AND OBJECTIVE BOX
Patient is a 65y old  Male who presents with a chief complaint of R Knee Swelling (18 Sep 2020 12:58)      INTERVAL HPI/ OVERNIGHT EVENTS: No overnight events reported. Pt seen and examined at bedside. Pt is scheduled for knee surgery today. He denies any N/V/F/SOB/Chills.       LABS                        9.2    9.60  )-----------( 343      ( 18 Sep 2020 07:20 )             33.0     09-18    133<L>  |  104  |  27<H>  ----------------------------<  90  4.4   |  20<L>  |  0.99    Ca    8.6      18 Sep 2020 07:20    TPro  6.5  /  Alb  2.9<L>  /  TBili  0.6  /  DBili  x   /  AST  24  /  ALT  20  /  AlkPhos  149<H>  09-18      ESR: 16  CRP: --  09-18 @ 07:20    ICU Vital Signs Last 24 Hrs  T(C): 36.8 (18 Sep 2020 10:55), Max: 36.8 (18 Sep 2020 07:31)  T(F): 98.3 (18 Sep 2020 10:55), Max: 98.3 (18 Sep 2020 07:31)  HR: 88 (18 Sep 2020 10:55) (76 - 88)  BP: 114/69 (18 Sep 2020 10:55) (100/56 - 114/69)  BP(mean): --  ABP: --  ABP(mean): --  RR: 18 (18 Sep 2020 10:55) (17 - 18)  SpO2: 97% (18 Sep 2020 10:55) (97% - 100%)      RADIOLOGY  < from: Xray Foot AP + Lateral, Bilateral (09.16.20 @ 14:16) >  Examination of the bilateral feet demonstrates severe arthrosis in the right first PIP joint, with valgus deformity. Calcaneal spurs no fractures. Internal fixation in the left first PIP joint. Soft tissue swelling in the plantar aspect of the left midfoot, without associated adjacent osseous abnormality. No fractures.    < end of copied text >      MICROBIOLOGY    PHYSICAL EXAM  Lower Extremity Focused  Vasc: DP/PT 1-4 b/l, +2 nisa malleolar and dorsum of the foot pitting edema b/l, CFT <3sec b/l  Derm: Right foot - 2nd digit ulcer at tip of toe, no drainage, no malodor, hallux maceration improving; small circular lesions present throughout his leg d/t to injuries related to his cast, Tinea Pedia b/l, onychomycosis.   Neuro: Protective sensations not intact from the distal tibia down (stocking distribution)  MSK: Partial amp of digits 2 and 3 on L foot, hammertoes on digits 2-5 on R foot, Hallux rigidus b/l

## 2020-09-18 NOTE — CONSULT NOTE ADULT - ATTENDING COMMENTS
chronic systolic CHF sp replacement R knee spacer, on abx  adequate UO, nl lactate suggest adequate perfusion  bedside TTE globally reduced LV fxn  no indication for inotropic support currently  plan to resume beta blocker, dig in am
Patient seen and examined with house-staff during bedside rounds.  Resident note read, including vitals, physical findings, laboratory data, and radiological reports.   Revisions included below.  Direct personal management at bed side and extensive interpretation of the data.  Plan was outlined and discussed in details with the housestaff.  Decision making of high complexity  Action taken for acute disease activity to reflect the level of care provided:  - medication reconciliation  - review laboratory data  start ativan as needed

## 2020-09-18 NOTE — PROGRESS NOTE ADULT - SUBJECTIVE AND OBJECTIVE BOX
Interval Events: Reviewed  Patient seen and examined at bedside.    Patient is a 65y old  Male who presents with a chief complaint of R Knee Swelling (19 Sep 2020 10:32)      PAST MEDICAL & SURGICAL HISTORY:  Diabetic neuropathy    STEMI (ST elevation myocardial infarction)    Diverticulitis    MRSA bacteremia    History of celiac disease    CHF (congestive heart failure)  EF ~ 25%    HTN (hypertension)    Diabetes  on insulin pump    Blood clot due to device, implant, or graft  was on blood thinners    HLD (hyperlipidemia)    Osteoarthritis    Atherosclerosis of coronary artery  CAD (coronary artery disease)    Status post percutaneous transluminal coronary angioplasty  in 2012    History of open reduction and internal fixation (ORIF) procedure    Surgery, elective  Right shoulder    Surgery, elective  right knee wound debridement    S/P TKR (total knee replacement), right  with infection Mrsa   per pt he was cleared from MRSA infection    S/P CABG x 1  2018    Stented coronary artery  10/18 heart attack  INFERIOR WALL MI    Other postprocedural status  Fixation hardware in foot LEFT        MEDICATIONS:  Pulmonary:    Antimicrobials:  cefepime   IVPB 2000 milliGRAM(s) IV Intermittent every 8 hours  DAPTOmycin IVPB 650 milliGRAM(s) IV Intermittent every 24 hours  rifAMPin 300 milliGRAM(s) Oral every 12 hours    Anticoagulants:  aspirin enteric coated 81 milliGRAM(s) Oral daily  prasugrel 10 milliGRAM(s) Oral daily    Cardiac:  digoxin     Tablet 0.125 milliGRAM(s) Oral daily  metoprolol succinate ER 25 milliGRAM(s) Oral daily  spironolactone 25 milliGRAM(s) Oral daily      Allergies    ACE inhibitors (Hives)  carvedilol (Other)  enalapril (Hives)  Entresto (Other)    Intolerances        Vital Signs Last 24 Hrs  T(C): 36.8 (19 Sep 2020 05:22), Max: 37 (19 Sep 2020 00:24)  T(F): 98.3 (19 Sep 2020 05:22), Max: 98.6 (19 Sep 2020 00:24)  HR: 97 (19 Sep 2020 10:15) (84 - 106)  BP: 101/72 (19 Sep 2020 10:15) (101/72 - 128/68)  BP(mean): 83 (19 Sep 2020 10:15) (77 - 91)  RR: 39 (19 Sep 2020 10:15) (10 - 39)  SpO2: 98% (19 Sep 2020 10:15) (92% - 98%)    09-18 @ 07:01  -  09-19 @ 07:00  --------------------------------------------------------  IN: 560 mL / OUT: 1197 mL / NET: -637 mL    09-19 @ 07:01 - 09-19 @ 12:25  --------------------------------------------------------  IN: 150 mL / OUT: 215 mL / NET: -65 mL          Review of Systems:   •	General: negative  •	Skin/Breast: negative  •	Ophthalmologic: negative  •	ENMT: negative  •	Respiratory and Thorax: negative  •	Cardiovascular: negative  •	Gastrointestinal: negative  •	Genitourinary: negative  •	Musculoskeletal: negative  •	Neurological: negative  •	Psychiatric: negative  •	Hematology/Lymphatics: negative  •	Endocrine: negative  •	Allergic/Immunologic: negative    Physical Exam:   • Constitutional:	Well-developed, well nourished  • Eyes:	EOMI; PERRL; no drainage or redness  • ENMT:	No oral lesions; no gross abnormalities  • Neck	No bruits; no thyromegaly or nodules  • Breasts:	not examined  • Back:	No deformity or limitation of movement  • Respiratory:	Breath Sounds equal & clear to percussion & auscultation, no accessory muscle use  • Cardiovascular:	Regular rate & rhythm, normal S1, S2; no murmurs, gallops or rubs; no S3, S4  • Gastrointestinal:	Soft, non-tender, no hepatosplenomegaly, normal bowel sounds  • Genitourinary:	not examined  • Rectal: not examined  • Extremities:	No cyanosis, clubbing or edema  • Vascular:	Equal and normal pulses (carotid, femoral, dorsalis pedis)  • Neurologica:l	not examined  • Skin:	No lesions; no rash  • Lymph Nodes:	No lymphadedenopathy  • Musculoskeletal:	No joint pain, swelling or deformity; no limitation of movement        LABS:  ABG - ( 18 Sep 2020 21:36 )  pH, Arterial: 7.34  pH, Blood: x     /  pCO2: 32    /  pO2: 76    / HCO3: 17    / Base Excess: -7.9  /  SaO2: 94                  CBC Full  -  ( 19 Sep 2020 06:42 )  WBC Count : 15.48 K/uL  RBC Count : 4.04 M/uL  Hemoglobin : 8.9 g/dL  Hematocrit : 29.5 %  Platelet Count - Automated : 243 K/uL  Mean Cell Volume : 73.0 fl  Mean Cell Hemoglobin : 22.0 pg  Mean Cell Hemoglobin Concentration : 30.2 gm/dL  Auto Neutrophil # : x  Auto Lymphocyte # : x  Auto Monocyte # : x  Auto Eosinophil # : x  Auto Basophil # : x  Auto Neutrophil % : x  Auto Lymphocyte % : x  Auto Monocyte % : x  Auto Eosinophil % : x  Auto Basophil % : x    09-19    135  |  107  |  26<H>  ----------------------------<  119<H>  4.8   |  15<L>  |  1.00    Ca    7.9<L>      19 Sep 2020 06:42  Phos  3.1     09-19  Mg     1.8     09-19    TPro  5.2<L>  /  Alb  2.3<L>  /  TBili  1.0  /  DBili  x   /  AST  21  /  ALT  15  /  AlkPhos  126<H>  09-18    PT/INR - ( 19 Sep 2020 06:42 )   PT: 19.8 sec;   INR: 1.69          PTT - ( 19 Sep 2020 06:42 )  PTT:28.5 sec      Urinalysis Basic - ( 17 Sep 2020 17:15 )    Color: Yellow / Appearance: Clear / SG: >=1.030 / pH: x  Gluc: x / Ketone: Trace mg/dL  / Bili: Small / Urobili: 0.2 E.U./dL   Blood: x / Protein: 100 mg/dL / Nitrite: NEGATIVE   Leuk Esterase: NEGATIVE / RBC: < 5 /HPF / WBC < 5 /HPF   Sq Epi: x / Non Sq Epi: 0-5 /HPF / Bacteria: Present /HPF              Culture Results:   No growth to date. (09-18 @ 17:30)  Culture Results:   No growth to date. (09-18 @ 17:30)  Culture Results:   No growth to date. (09-18 @ 17:30)  Culture Results:   No growth to date. (09-18 @ 17:29)  Culture Results:   No growth to date. (09-18 @ 17:29)  Culture Results:   No growth to date. (09-18 @ 17:28)  Culture Results:   No growth to date. (09-18 @ 17:28)  Culture Results:   Less than 10,000 cols/cc; Insignificant amount of growth. (09-17 @ 18:33)      RADIOLOGY & ADDITIONAL STUDIES (The following images were personally reviewed):  Loja:                                     No  Urine output:                       adequate  DVT prophylaxis:                 Yes  Flattus:                                  Yes  Bowel movement:              No

## 2020-09-18 NOTE — PROGRESS NOTE ADULT - PROBLEM SELECTOR PLAN 2
9 day s/p arthroscopic I and D, 7/17/2020.  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 right knee explant and antibiotic spacer.  Will follow with ID regarding the right hip cultures which is negative to date and does not look infected  follow on the cultures from the right knee

## 2020-09-18 NOTE — PROGRESS NOTE ADULT - SUBJECTIVE AND OBJECTIVE BOX
Ortho Note    Pt comfortable without complaints, pain controlled  Denies CP, SOB, N/V, numbness/tingling     Vital Signs Last 24 Hrs  T(C): 36.7 (18 Sep 2020 01:30), Max: 37.9 (17 Sep 2020 05:59)  T(F): 98 (18 Sep 2020 01:30), Max: 100.2 (17 Sep 2020 05:59)  HR: 76 (18 Sep 2020 01:30) (76 - 110)  BP: 110/58 (18 Sep 2020 01:30) (94/53 - 114/69)  BP(mean): --  RR: 18 (18 Sep 2020 01:30) (16 - 18)  SpO2: 100% (18 Sep 2020 01:30) (95% - 100%)    RLE:  DSG- R hip gauze/ teg CDI; R knee gauze/ teg CDI; kerlix around recently debrided R foot (diabetic ulcers), managed by Podiatry  Pulses: +DP;  feet cool to touch; at baseline; no edema  Sensation: Sensation intact to baseline (diminished sensation- h/o severe neuropathy; follows with vascular Dr. Malloy outpatient)  Motor: 5/5 EHL/FHL/TA/GS    LLE- open healing vascular wound; wound bed pink; no erythema, odor, or purulent drainage ; covered with WTD dsg  Pulses: feet cool to touch; at baseline; no edema  Sensation: SILT to baseline (diminished sensation- h/o severe neuropathy; follows with vascular Dr. Malloy outpatient)  Motor: 5/5 EHL/FHL/TA/GS               A/P: 65yMale admitted for R knee pain, drainage s/p right knee arthroscopic I+D 7/17, right knee explant and abx spacer 7/22 with Dr. Castro;  and debridement of LLE wound with wound vac placement on 7/30 by Dr. Bowen. Was original d/c'd home on 8/10/20 with PICC and vancomycin IV q12h + rifampin.  Readmitted, and now s/p R hip exchange of hardware on 09/04/2020  - F/u AM CBC. Patient will go to OR today for R knee spacer exchange + gastroc flap with Plastics Dr. Bran; goal H+H prior to OR 10.0/30.0 for anticipated post-op drop.  - Pain Control- appreciate pain management recs  - DVT ppx: ASA + effient (home med)  - PT, WBS: WBAT  - OOB for meals, aggressive I/S  - continue bowel regimen  - appreciate ID recs- Continue dapto, cefepime, rifampin; check CPK 9/21 with dapto; f/o CBC with diff and CMP (elevated liver enzymes in past with rifampin)  - appreciate internal medicine, and cardiology recs (seen by Dr. Caldera today- plan to continue home meds)  - appreciate plastics recs- continue WTD dressings on LLE vascular wound qd and PRN  - appreciate gen surg recs- will need cholecystectomy (elective) in near future, currently no abd pain, n/v; dressing changes as per nursing; no acute drainage from RUQ site  - appreciate psych recs- currently feels well on regimen today, will reconsult as needed  - podiatry consulted for R foot diabetic ulcers x2- debrided at bedside; bactroban applied and dsg changes as per podiatry  - dispo: OR today  - NPO for OR      Ortho Pager [phone]

## 2020-09-18 NOTE — PROGRESS NOTE ADULT - SUBJECTIVE AND OBJECTIVE BOX
Pain Management Progress Note - [facility] [phone]      HPI: Patient seen and examined today. Patient with a history of knee pain, diabetic neuropathy, CHF, HTN, MRSA bacteremia, diverticulitis, COPD, hyperkalemia, chronic systolic CHF, osteoarthritis, s/p R Revision TKA and antibiotic spacer by Dr. Castro on 7/22, now presenting with R knee pain and swelling x3 days. Patient reports  R knee pain, pain managed with current pain medication regimen. Patient Axox3, denies any itchiness from Morphine Po. LLE and R knee dressing intact, Patient scheduled for a R knee space exchange today.       Pain is _x__ sharp ____dull ___burning _x__achy ___ Intensity: ____ mild _x__mod x__severe     Location _x___surgical site ____cervical _____lumbar ____abd ____upper ext____lower ext    Worse with _x___activity _x___movement _____physical therapy___ Rest    Improved with _x___medication __x__rest ____physical therapy      BUpivacaine liposome 1.3% Injectable (no eMAR)  morphine  IR  morphine  IR  morphine  - Injectable  lactated ringers.  BACItracin   Ointment  mupirocin 2% Ointment  nystatin Cream  morphine  IR  morphine  IR  nystatin Powder  morphine  - Injectable  chlorhexidine 2% Cloths  insulin glargine Injectable (LANTUS)  insulin glargine Injectable (LANTUS)  insulin glargine Injectable (LANTUS)  pantoprazole  Injectable  metoclopramide Injectable  acetaminophen   Tablet ..  diphenhydrAMINE   Injectable  lactated ringers.  levothyroxine  sodium chloride 0.9% Bolus  gabapentin  gabapentin  gabapentin  sertraline  insulin glargine Injectable (LANTUS)  morphine  IR  morphine  IR  morphine  - Injectable  HYDROmorphone   Tablet  HYDROmorphone   Tablet  insulin lispro Injectable (HumaLOG)  diphenhydrAMINE  insulin glargine Injectable (LANTUS)  (Floorstock)  (Floorstock)  diazepam    Tablet  cefepime   IVPB  cefepime   IVPB  cefepime   IVPB  DAPTOmycin IVPB  insulin lispro Injectable (HumaLOG)  insulin glargine Injectable (LANTUS)  insulin lispro (HumaLOG) corrective regimen sliding scale  HYDROmorphone  Injectable  rifAMPin  glucagon  Injectable  dextrose 50% Injectable  dextrose 50% Injectable  dextrose 50% Injectable  dextrose 40% Gel  dextrose 5%.  lidocaine 2% Injectable  lidocaine 2% Injectable  tamsulosin  rosuvastatin  gabapentin  cyclobenzaprine  spironolactone  atorvastatin  metoprolol succinate ER  prasugrel  digoxin     Tablet  vancomycin  IVPB  aspirin  chewable  HYDROmorphone  Injectable  senna  bisacodyl Suppository  polyethylene glycol 3350  ondansetron Injectable  aluminum hydroxide/magnesium hydroxide/simethicone Suspension  acetaminophen   Tablet ..  lactated ringers.  (Floorstock)  oxyCODONE    IR  oxyCODONE    IR  morphine  - Injectable  vancomycin  IVPB  vancomycin  IVPB  vancomycin  IVPB  oxyCODONE    IR  oxyCODONE    IR  rosuvastatin  lactated ringers.  chlorhexidine 2% Cloths  povidone iodine 5% Nasal Swab  spironolactone  diphenhydrAMINE  chlorhexidine 4% Liquid  sodium chloride 0.9% lock flush  cyclobenzaprine  gabapentin  HYDROmorphone  Injectable  HYDROmorphone   Tablet  HYDROmorphone   Tablet  HYDROmorphone  Injectable  glucagon  Injectable  dextrose 50% Injectable  dextrose 50% Injectable  dextrose 50% Injectable  dextrose 40% Gel  dextrose 5%.  insulin lispro (HumaLOG) corrective regimen sliding scale  tamsulosin  spironolactone  vancomycin  IVPB  pantoprazole    Tablet  silver sulfADIAZINE 1% Cream  metoprolol succinate ER  rifAMPin  prasugrel  atorvastatin  DULoxetine  tamsulosin Oral Tab/Cap - Peds  oxyCODONE    IR  oxyCODONE  ER Tablet  celecoxib  aspirin enteric coated  acetaminophen   Tablet ..  spironolactone Oral Tab/Cap - Peds  HYDROmorphone  Injectable      ROS: Const:  -___febrile   Eyes:___ENT:___CV: __-_chest pain  Resp: __-__sob  GI:_-__nausea __-_vomiting __ abd pain _x__npo ___clears __full diet __bm  :___ Musk: _x__pain _-__spasm  Skin:___ Neuro:  _-__fzampcny_-__gyoboppfn_-__ numbness _-__weakness __x_paresth  Psych:_-_anxiety  Endo:___ Heme:___Allergy:_________, _x__all others reviewed and negative      PAST MEDICAL & SURGICAL HISTORY:  Diabetic neuropathy  STEMI (ST elevation myocardial infarction)  Diverticulitis  MRSA bacteremia  History of celiac disease  CHF (congestive heart failure): EF ~ 25%  HTN (hypertension)  Diabetes: on insulin pump  Blood clot due to device, implant, or graft: was on blood thinners  HLD (hyperlipidemia)  Osteoarthritis  Atherosclerosis of coronary artery: CAD (coronary artery disease)  Status post percutaneous transluminal coronary angioplasty: in 2012  History of open reduction and internal fixation (ORIF) procedure  Surgery, elective: Right shoulder  Surgery, elective: right knee wound debridement  S/P TKR (total knee replacement), right: with infection Mrsa   per pt he was cleared from MRSA infection  S/P CABG x 1: 2018  Stented coronary artery: 10/18 heart attack  INFERIOR WALL MI  Other postprocedural status: Fixation hardware in foot LEFT    09-18 @ 07:2081 mL/min/1.73M2          Hemoglobin: 9.2 g/dL (09-18 @ 07:20)  Hemoglobin: 9.8 g/dL (09-17 @ 06:48)        T(C): 36.8 (09-18-20 @ 10:55), Max: 37.2 (09-17-20 @ 14:25)  HR: 88 (09-18-20 @ 10:55) (76 - 95)  BP: 114/69 (09-18-20 @ 10:55) (97/59 - 114/69)  RR: 18 (09-18-20 @ 10:55) (17 - 18)  SpO2: 97% (09-18-20 @ 10:55) (96% - 100%)  Wt(kg): --           PHYSICAL EXAM:  Gen Appearance: x___no acute distress _x__appropriate        Neuro: _x__SILT feet____ EOM Intact Psych: AAOX__3, x___mood/affect appropriate        Eyes: __x_conjunctiva WNL  __x__ Pupils equal and round        ENT: x___ears and nose atraumatic_x__ Hearing grossly intact        Neck: _x__trachea midline, no visible masses ___thyroid without palpable mass    Resp: _x__Nml WOB____No tactile fremitus ___clear to auscultation    Cardio: ___extremities free from edema __x__pedal pulses palpable    GI/Abdomen: __x_soft ___x__ Nontender___x___Nondistended_____HSM    Lymphatic: ___no palpable nodes in neck  ___no palpable nodes calves and feet    Skin/Wound: ___Incision, _x__Dressing c/d/i,   ____surrounding tissues soft,  ___drain/chest tube present____    Muscular: EHL _4__/5  Gastrocnemius_4__/5    ___absent clubbing/cyanosis          ASSESSMENT: This is a 65y old Male with a history of knee pain, diabetic neuropathy, CHF, HTN, MRSA bacteremia, diverticulitis, COPD, hyperkalemia, chronic systolic CHF, osteoarthritis,  s/p R Revision TKA and antibiotic spacer by Dr. Castro on 7/22, now presenting with R knee pain and swelling x3 days, pain managed with current pain medication regimen.       Recommended Treatment PLAN:  1. Morphine IR 15-30mg Po Q4h prn moderate to severe pain  2. Flexeril 5mg PO Q8h prn muscle spasms  3. Gabapentin 100mg PO TID  4. Morphine 2mg IVP Q4h prn breakthrough pain  Plan discussed with Dr. Max

## 2020-09-18 NOTE — CONSULT NOTE ADULT - SUBJECTIVE AND OBJECTIVE BOX
SICU CONSULT NOTE    HPI:      SICU ADDENDUM:     PAST MEDICAL & SURGICAL HISTORY:  Diabetic neuropathy    STEMI (ST elevation myocardial infarction)    Diverticulitis    MRSA bacteremia    History of celiac disease    CHF (congestive heart failure)  EF ~ 25%    HTN (hypertension)    Diabetes  on insulin pump    Blood clot due to device, implant, or graft  was on blood thinners    HLD (hyperlipidemia)    Osteoarthritis    Atherosclerosis of coronary artery  CAD (coronary artery disease)    Status post percutaneous transluminal coronary angioplasty  in 2012    History of open reduction and internal fixation (ORIF) procedure    Surgery, elective  Right shoulder    Surgery, elective  right knee wound debridement    S/P TKR (total knee replacement), right  with infection Mrsa   per pt he was cleared from MRSA infection    S/P CABG x 1  2018    Stented coronary artery  10/18 heart attack  INFERIOR WALL MI    Other postprocedural status  Fixation hardware in foot LEFT        PAST SURGICAL HISTORY:     REVIEW OF SYSTEMS:   Pertinent positives/negatives noted in HPI.     HOME MEDICATIONS:    ALLERGIES:  Allergies    ACE inhibitors (Hives)  carvedilol (Other)  enalapril (Hives)  Entresto (Other)    Intolerances        SOCIAL HISTORY:    FAMILY HISTORY:  Family history of allergies  daughter    Family history of heart disease (Mother)        Vital Signs Last 24 Hrs  T(C): 36.8 (18 Sep 2020 10:55), Max: 36.8 (18 Sep 2020 07:31)  T(F): 98.3 (18 Sep 2020 10:55), Max: 98.3 (18 Sep 2020 07:31)  HR: 84 (18 Sep 2020 21:00) (76 - 88)  BP: 114/69 (18 Sep 2020 10:55) (102/57 - 114/69)  BP(mean): --  RR: 18 (18 Sep 2020 21:00) (17 - 18)  SpO2: 97% (18 Sep 2020 21:00) (97% - 100%)    PHYSICAL EXAM:    LABS:                        9.2    9.60  )-----------( 343      ( 18 Sep 2020 07:20 )             33.0     09-18    133<L>  |  104  |  27<H>  ----------------------------<  90  4.4   |  20<L>  |  0.99    Ca    8.6      18 Sep 2020 07:20    TPro  6.5  /  Alb  2.9<L>  /  TBili  0.6  /  DBili  x   /  AST  24  /  ALT  20  /  AlkPhos  149<H>  09-18      Urinalysis Basic - ( 17 Sep 2020 17:15 )    Color: Yellow / Appearance: Clear / SG: >=1.030 / pH: x  Gluc: x / Ketone: Trace mg/dL  / Bili: Small / Urobili: 0.2 E.U./dL   Blood: x / Protein: 100 mg/dL / Nitrite: NEGATIVE   Leuk Esterase: NEGATIVE / RBC: < 5 /HPF / WBC < 5 /HPF   Sq Epi: x / Non Sq Epi: 0-5 /HPF / Bacteria: Present /HPF     SICU CONSULT NOTE    HPI:  Juanito Blas is a 66y/o M with PMHx of CAD (s/p CABG 2018), CHF (EF 25%) Diabetes, HLD, HTN, R Revision Total Knee Arthroplasty with Antibiotic Spacer on 7/22 by Dr. Castro for Prosthetic Joint Infection, presents with 2-3 days of worsening R knee pain and swelling. Pt denies any trauma to R knee. Pt denies any numbness/tingling. Notes minimal drainage from incision, no purulence. Pt denies any recent illness. Pt was discharged last hospital visit 3 weeks ago after multiple revision knee surgeries complicated by prosthetic joint infection and bacteremia. Sent home with PICC line, getting vancomycin 1000 Q12 and Rifampin 300 Q12 daily. Reports compliance with medication. Denies any chest pain/SOB/fevers/chills.   Pt also has chronic large skin defect on L anterior tibia, which was managed by vascular and plastic surgery last visit. Only reports mild pain over this wound, denies any drainage, trauma. (01 Sep 2020 23:12)    SICU ADDENDUM:   64 yo M h/o CAD (s/p CABG 2018), CHF (EF 15%) Diabetes, HLD, HTN, R Revision Total Knee Arthroplasty with Antibiotic Spacer on 7/22. Previous admission complicated by prosthetic joint infection and MRSA bacteremia discharged on Vanc and Rifampin. Readmitted on 9/1 for knee pain 2/2 Chronic R knee infection. Hospital course complicated by drainage from perc Caitlyn site s/p I&D. Cultures growing VRE and Pseudomonas on Dapto, Cefepime, and Rifampin. Taken to OR for explant of spacer, new spacer placement and plastic surgery closure with significant blood loss (1200 EBL). Placed on 0.6 mcg dania intra op due to hypotension during induction. He received 3L Crystalloids , 3 PRBC, w/ 500cc urine. Transferred to the SICU for HD monitoring.    PAST MEDICAL & SURGICAL HISTORY:  Diabetic neuropathy    STEMI (ST elevation myocardial infarction)    Diverticulitis    MRSA bacteremia    History of celiac disease    CHF (congestive heart failure)  EF ~ 25%    HTN (hypertension)    Diabetes  on insulin pump    Blood clot due to device, implant, or graft  was on blood thinners    HLD (hyperlipidemia)    Osteoarthritis    Atherosclerosis of coronary artery  CAD (coronary artery disease)        History of open reduction and internal fixation (ORIF) procedure    Surgery, elective  Right shoulder    Surgery, elective  right knee wound debridement    S/P TKR (total knee replacement), right  with infection Mrsa   per pt he was cleared from MRSA infection    S/P CABG x 1  2018    Stented coronary artery  10/18 heart attack  INFERIOR WALL MI    Other postprocedural status  Fixation hardware in foot LEFT        PAST SURGICAL HISTORY:   History of open reduction and internal fixation (ORIF) procedure     Other postprocedural status Fixation hardware in foot LEFT    S/P CABG x 1 2018    S/P TKR (total knee replacement), right with infection Mrsa   per pt he was cleared from MRSA infection    Stented coronary artery 10/18 heart attack  INFERIOR WALL MI    Surgery, elective right knee wound debridement    Surgery, elective Right shoulder.     REVIEW OF SYSTEMS:   Pertinent positives/negatives noted in HPI.     HOME MEDICATIONS:    ALLERGIES:  Allergies    ACE inhibitors (Hives)  carvedilol (Other)  enalapril (Hives)  Entresto (Other)    Intolerances        SOCIAL HISTORY:    FAMILY HISTORY:  Family history of allergies  daughter    Family history of heart disease (Mother)        Vital Signs Last 24 Hrs  T(C): 36.6 (18 Sep 2020 21:00), Max: 36.8 (18 Sep 2020 07:31)  T(F): 97.9 (18 Sep 2020 21:00), Max: 98.3 (18 Sep 2020 07:31)  HR: 90 (18 Sep 2020 22:00) (76 - 90)  BP: 121/57 (18 Sep 2020 22:00) (102/57 - 121/57)  BP(mean): 82 (18 Sep 2020 22:00) (82 - 84)  RR: 20 (18 Sep 2020 22:00) (17 - 20)  SpO2: 96% (18 Sep 2020 22:00) (94% - 100%)    PHYSICAL EXAM:  General: NAD, resting comfortably in bed  HEENT: PERRL, no JVD  C/V: NSR, no murmurs or gallops  Pulm: Nonlabored breathing, no respiratory distress, CTAB, no wheezes, or rhonchi  Abd: soft, non-tender, non-distended.  Extrem: Cool to touch , no edema,, RLE: Cst in placer, hemovac + MIS + wound vac w/ ss output  LLE: R thigh graft site with tegederm, shin flap w/ wound vac   Neuro: A/O x 3, CNs II-XII grossly intact, no focal deficits, normal sensation  Pulses: R bi DP/bi PT, L bi DP/bi PT  : Loja w/ straw colored urine  Skin: No rash       LABS:                        9.0    14.27 )-----------( 254      ( 18 Sep 2020 21:25 )             30.1     09-18    135  |  109<H>  |  27<H>  ----------------------------<  88  4.2   |  16<L>  |  1.02    Ca    7.7<L>      18 Sep 2020 21:25  Phos  3.1     09-18  Mg     1.7     09-18    TPro  5.2<L>  /  Alb  2.3<L>  /  TBili  1.0  /  DBili  x   /  AST  21  /  ALT  15  /  AlkPhos  126<H>  09-18    PT/INR - ( 18 Sep 2020 21:46 )   PT: 20.9 sec;   INR: 1.79          PTT - ( 18 Sep 2020 21:46 )  PTT:26.9 sec  Urinalysis Basic - ( 17 Sep 2020 17:15 )    Color: Yellow / Appearance: Clear / SG: >=1.030 / pH: x  Gluc: x / Ketone: Trace mg/dL  / Bili: Small / Urobili: 0.2 E.U./dL   Blood: x / Protein: 100 mg/dL / Nitrite: NEGATIVE   Leuk Esterase: NEGATIVE / RBC: < 5 /HPF / WBC < 5 /HPF   Sq Epi: x / Non Sq Epi: 0-5 /HPF / Bacteria: Present /HPF    Radiology:

## 2020-09-18 NOTE — PROGRESS NOTE ADULT - ASSESSMENT
Patient is a 65y old  Male who presents with a chief complaint of R Knee Swelling scheduled for a R knee abx spacer exchange and gastroc flap procedure s/p pyogenic arthritis this Friday. He has requested a podiatry consult for diabetic foot ulcers on the right foot. Right foot found to have Rose grade 1 ulcers on 2nd and 3rd digits.    Plan:  Bactroban applied to digital wounds  Betadine applied to posterior aspect of the base of the hallux   Dressed the foot in DSD and advised patient to avoid pressure to the ulcer sites  Plan discussed with Dr. Davalos  Podiatry will continue to follow

## 2020-09-19 LAB
A1C WITH ESTIMATED AVERAGE GLUCOSE RESULT: 7.2 % — HIGH (ref 4–5.6)
ANION GAP SERPL CALC-SCNC: 13 MMOL/L — SIGNIFICANT CHANGE UP (ref 5–17)
APTT BLD: 28.5 SEC — SIGNIFICANT CHANGE UP (ref 27.5–35.5)
BUN SERPL-MCNC: 26 MG/DL — HIGH (ref 7–23)
CALCIUM SERPL-MCNC: 7.9 MG/DL — LOW (ref 8.4–10.5)
CHLORIDE SERPL-SCNC: 107 MMOL/L — SIGNIFICANT CHANGE UP (ref 96–108)
CO2 SERPL-SCNC: 15 MMOL/L — LOW (ref 22–31)
CREAT SERPL-MCNC: 1 MG/DL — SIGNIFICANT CHANGE UP (ref 0.5–1.3)
CULTURE RESULTS: SIGNIFICANT CHANGE UP
DIGOXIN SERPL-MCNC: 0.5 NG/ML — LOW (ref 0.8–2)
ESTIMATED AVERAGE GLUCOSE: 160 MG/DL — HIGH (ref 68–114)
GLUCOSE BLDC GLUCOMTR-MCNC: 105 MG/DL — HIGH (ref 70–99)
GLUCOSE BLDC GLUCOMTR-MCNC: 133 MG/DL — HIGH (ref 70–99)
GLUCOSE BLDC GLUCOMTR-MCNC: 160 MG/DL — HIGH (ref 70–99)
GLUCOSE BLDC GLUCOMTR-MCNC: 165 MG/DL — HIGH (ref 70–99)
GLUCOSE SERPL-MCNC: 119 MG/DL — HIGH (ref 70–99)
HCT VFR BLD CALC: 29.5 % — LOW (ref 39–50)
HGB BLD-MCNC: 8.9 G/DL — LOW (ref 13–17)
INR BLD: 1.69 — HIGH (ref 0.88–1.16)
MAGNESIUM SERPL-MCNC: 1.8 MG/DL — SIGNIFICANT CHANGE UP (ref 1.6–2.6)
MCHC RBC-ENTMCNC: 22 PG — LOW (ref 27–34)
MCHC RBC-ENTMCNC: 30.2 GM/DL — LOW (ref 32–36)
MCV RBC AUTO: 73 FL — LOW (ref 80–100)
NRBC # BLD: 0 /100 WBCS — SIGNIFICANT CHANGE UP (ref 0–0)
PHOSPHATE SERPL-MCNC: 3.1 MG/DL — SIGNIFICANT CHANGE UP (ref 2.5–4.5)
PLATELET # BLD AUTO: 243 K/UL — SIGNIFICANT CHANGE UP (ref 150–400)
POTASSIUM SERPL-MCNC: 4.8 MMOL/L — SIGNIFICANT CHANGE UP (ref 3.5–5.3)
POTASSIUM SERPL-SCNC: 4.8 MMOL/L — SIGNIFICANT CHANGE UP (ref 3.5–5.3)
PROTHROM AB SERPL-ACNC: 19.8 SEC — HIGH (ref 10.6–13.6)
RBC # BLD: 4.04 M/UL — LOW (ref 4.2–5.8)
RBC # FLD: 21.5 % — HIGH (ref 10.3–14.5)
SODIUM SERPL-SCNC: 135 MMOL/L — SIGNIFICANT CHANGE UP (ref 135–145)
SPECIMEN SOURCE: SIGNIFICANT CHANGE UP
WBC # BLD: 15.48 K/UL — HIGH (ref 3.8–10.5)
WBC # FLD AUTO: 15.48 K/UL — HIGH (ref 3.8–10.5)

## 2020-09-19 PROCEDURE — 99232 SBSQ HOSP IP/OBS MODERATE 35: CPT

## 2020-09-19 PROCEDURE — 99232 SBSQ HOSP IP/OBS MODERATE 35: CPT | Mod: GC

## 2020-09-19 RX ORDER — HYDROMORPHONE HYDROCHLORIDE 2 MG/ML
30 INJECTION INTRAMUSCULAR; INTRAVENOUS; SUBCUTANEOUS
Refills: 0 | Status: DISCONTINUED | OUTPATIENT
Start: 2020-09-19 | End: 2020-09-19

## 2020-09-19 RX ORDER — NALOXONE HYDROCHLORIDE 4 MG/.1ML
0.1 SPRAY NASAL
Refills: 0 | Status: DISCONTINUED | OUTPATIENT
Start: 2020-09-19 | End: 2020-10-03

## 2020-09-19 RX ORDER — PRASUGREL 5 MG/1
10 TABLET, FILM COATED ORAL DAILY
Refills: 0 | Status: DISCONTINUED | OUTPATIENT
Start: 2020-09-19 | End: 2020-10-03

## 2020-09-19 RX ORDER — ASPIRIN/CALCIUM CARB/MAGNESIUM 324 MG
81 TABLET ORAL DAILY
Refills: 0 | Status: DISCONTINUED | OUTPATIENT
Start: 2020-09-19 | End: 2020-09-21

## 2020-09-19 RX ORDER — ONDANSETRON 8 MG/1
4 TABLET, FILM COATED ORAL EVERY 6 HOURS
Refills: 0 | Status: DISCONTINUED | OUTPATIENT
Start: 2020-09-19 | End: 2020-10-03

## 2020-09-19 RX ORDER — DIGOXIN 250 MCG
0.12 TABLET ORAL ONCE
Refills: 0 | Status: COMPLETED | OUTPATIENT
Start: 2020-09-19 | End: 2020-09-19

## 2020-09-19 RX ORDER — HYDROMORPHONE HYDROCHLORIDE 2 MG/ML
30 INJECTION INTRAMUSCULAR; INTRAVENOUS; SUBCUTANEOUS
Refills: 0 | Status: DISCONTINUED | OUTPATIENT
Start: 2020-09-19 | End: 2020-09-21

## 2020-09-19 RX ADMIN — CYCLOBENZAPRINE HYDROCHLORIDE 5 MILLIGRAM(S): 10 TABLET, FILM COATED ORAL at 11:47

## 2020-09-19 RX ADMIN — MORPHINE SULFATE 2 MILLIGRAM(S): 50 CAPSULE, EXTENDED RELEASE ORAL at 06:45

## 2020-09-19 RX ADMIN — PRASUGREL 10 MILLIGRAM(S): 5 TABLET, FILM COATED ORAL at 11:10

## 2020-09-19 RX ADMIN — HYDROMORPHONE HYDROCHLORIDE 30 MILLILITER(S): 2 INJECTION INTRAMUSCULAR; INTRAVENOUS; SUBCUTANEOUS at 09:45

## 2020-09-19 RX ADMIN — SODIUM CHLORIDE 40 MILLILITER(S): 9 INJECTION, SOLUTION INTRAVENOUS at 01:02

## 2020-09-19 RX ADMIN — Medication 2: at 11:43

## 2020-09-19 RX ADMIN — MORPHINE SULFATE 30 MILLIGRAM(S): 50 CAPSULE, EXTENDED RELEASE ORAL at 11:00

## 2020-09-19 RX ADMIN — CEFEPIME 100 MILLIGRAM(S): 1 INJECTION, POWDER, FOR SOLUTION INTRAMUSCULAR; INTRAVENOUS at 09:33

## 2020-09-19 RX ADMIN — Medication 0.12 MILLIGRAM(S): at 09:26

## 2020-09-19 RX ADMIN — DAPTOMYCIN 126 MILLIGRAM(S): 500 INJECTION, POWDER, LYOPHILIZED, FOR SOLUTION INTRAVENOUS at 01:05

## 2020-09-19 RX ADMIN — MORPHINE SULFATE 2 MILLIGRAM(S): 50 CAPSULE, EXTENDED RELEASE ORAL at 06:25

## 2020-09-19 RX ADMIN — GABAPENTIN 100 MILLIGRAM(S): 400 CAPSULE ORAL at 06:25

## 2020-09-19 RX ADMIN — HYDROMORPHONE HYDROCHLORIDE 0.5 MILLIGRAM(S): 2 INJECTION INTRAMUSCULAR; INTRAVENOUS; SUBCUTANEOUS at 02:15

## 2020-09-19 RX ADMIN — MORPHINE SULFATE 2 MILLIGRAM(S): 50 CAPSULE, EXTENDED RELEASE ORAL at 00:30

## 2020-09-19 RX ADMIN — Medication 0.12 MILLIGRAM(S): at 09:45

## 2020-09-19 RX ADMIN — MORPHINE SULFATE 2 MILLIGRAM(S): 50 CAPSULE, EXTENDED RELEASE ORAL at 00:13

## 2020-09-19 RX ADMIN — Medication 81 MILLIGRAM(S): at 11:09

## 2020-09-19 RX ADMIN — MORPHINE SULFATE 30 MILLIGRAM(S): 50 CAPSULE, EXTENDED RELEASE ORAL at 04:06

## 2020-09-19 RX ADMIN — MORPHINE SULFATE 30 MILLIGRAM(S): 50 CAPSULE, EXTENDED RELEASE ORAL at 10:09

## 2020-09-19 RX ADMIN — MORPHINE SULFATE 30 MILLIGRAM(S): 50 CAPSULE, EXTENDED RELEASE ORAL at 04:30

## 2020-09-19 RX ADMIN — GABAPENTIN 100 MILLIGRAM(S): 400 CAPSULE ORAL at 21:50

## 2020-09-19 RX ADMIN — CEFEPIME 100 MILLIGRAM(S): 1 INJECTION, POWDER, FOR SOLUTION INTRAMUSCULAR; INTRAVENOUS at 01:00

## 2020-09-19 RX ADMIN — HYDROMORPHONE HYDROCHLORIDE 0.5 MILLIGRAM(S): 2 INJECTION INTRAMUSCULAR; INTRAVENOUS; SUBCUTANEOUS at 01:53

## 2020-09-19 RX ADMIN — GABAPENTIN 100 MILLIGRAM(S): 400 CAPSULE ORAL at 13:12

## 2020-09-19 RX ADMIN — CEFEPIME 100 MILLIGRAM(S): 1 INJECTION, POWDER, FOR SOLUTION INTRAMUSCULAR; INTRAVENOUS at 17:56

## 2020-09-19 RX ADMIN — Medication 25 MILLIGRAM(S): at 06:25

## 2020-09-19 NOTE — PROGRESS NOTE ADULT - ASSESSMENT
64 yo M h/o CAD (s/p CABG 2018), CHF (EF 15%) Diabetes, HLD, HTN, R Revision Total Knee Arthroplasty with Antibiotic Spacer on 7/22 taken to OR for explant of spacer, new spacer placement and plastic surgery closure with significant blood loss admitted to SICU for HD monitoring.    Neuro: Chronic knee and neuropathic pain- Morphine 15-30 IR PO Q14 PRN, Flexeril 5mg PO Q8hrm Gabapentin 100mg PO TID, Morphine 2mg PO TID, Morphine 2mg Q4 prn breakthrough, dilaudid pca  CV: CHF (EF 15%), CAD s/p CABG 2018, HTN, HLD- Metoprolol ER 25mg, Digoxin, Spironolactone, Aspirin, Prasugrel, Echo 7/17 w/ EF 15%    Pulm:On NC  GI/FEN: CLD carb consistent, Protoxin   : Loja  ID: MRSA bacteremia previously on Vanc and Cefepime, VRE and Pseudomonus in Per Caitlyn site on Dapto (9/6-  ) Cefepime ( 9/6- ) Rifampin (9/1- )  Endo: Diabetes, ISS, Lantus 20 at night  Heme: Hgd stable s/p 3 units PRBC post op  PPX: SCDs  Lines: R PICC (7/27- ),  R IV, L radial A line (9/18- )  Wounds: RLE  antibiotic spacer  and Gastroc flap w/ hemovac to deep wound, MIS to superficial, wound vac, LLE thigh graft site, shin skin flap w/ wound vac   PT/OT: Ordered

## 2020-09-19 NOTE — PROGRESS NOTE ADULT - SUBJECTIVE AND OBJECTIVE BOX
Interval Events: Reviewed  Patient seen and examined at bedside.    Patient is a 65y old  Male who presents with a chief complaint of R Knee Swelling (19 Sep 2020 01:34)  no acute event overnight, pain controlled      PAST MEDICAL & SURGICAL HISTORY:  Diabetic neuropathy    STEMI (ST elevation myocardial infarction)    Diverticulitis    MRSA bacteremia    History of celiac disease    CHF (congestive heart failure)  EF ~ 25%    HTN (hypertension)    Diabetes  on insulin pump    Blood clot due to device, implant, or graft  was on blood thinners    HLD (hyperlipidemia)    Osteoarthritis    Atherosclerosis of coronary artery  CAD (coronary artery disease)    Status post percutaneous transluminal coronary angioplasty  in 2012    History of open reduction and internal fixation (ORIF) procedure    Surgery, elective  Right shoulder    Surgery, elective  right knee wound debridement    S/P TKR (total knee replacement), right  with infection Mrsa   per pt he was cleared from MRSA infection    S/P CABG x 1  2018    Stented coronary artery  10/18 heart attack  INFERIOR WALL MI    Other postprocedural status  Fixation hardware in foot LEFT        MEDICATIONS:  Pulmonary:    Antimicrobials:  cefepime   IVPB 2000 milliGRAM(s) IV Intermittent every 8 hours  DAPTOmycin IVPB 650 milliGRAM(s) IV Intermittent every 24 hours  rifAMPin 300 milliGRAM(s) Oral every 12 hours    Anticoagulants:  aspirin enteric coated 81 milliGRAM(s) Oral daily  prasugrel 10 milliGRAM(s) Oral daily    Cardiac:  digoxin     Tablet 0.125 milliGRAM(s) Oral daily  metoprolol succinate ER 25 milliGRAM(s) Oral daily  spironolactone 25 milliGRAM(s) Oral daily      Allergies    ACE inhibitors (Hives)  carvedilol (Other)  enalapril (Hives)  Entresto (Other)    Intolerances        Vital Signs Last 24 Hrs  T(C): 36.8 (19 Sep 2020 05:22), Max: 37 (19 Sep 2020 00:24)  T(F): 98.3 (19 Sep 2020 05:22), Max: 98.6 (19 Sep 2020 00:24)  HR: 100 (19 Sep 2020 07:00) (84 - 106)  BP: 122/58 (19 Sep 2020 07:00) (105/56 - 128/68)  BP(mean): 83 (19 Sep 2020 07:00) (77 - 91)  RR: 17 (19 Sep 2020 07:00) (10 - 24)  SpO2: 98% (19 Sep 2020 07:00) (92% - 98%)    09-18 @ 07:01 - 09-19 @ 07:00  --------------------------------------------------------  IN: 560 mL / OUT: 1197 mL / NET: -637 mL    09-19 @ 07:01 - 09-19 @ 07:36  --------------------------------------------------------  IN: 40 mL / OUT: 0 mL / NET: 40 mL          Review of Systems:   •	General: negative  •	Skin/Breast: negative  •	Ophthalmologic: negative  •	ENMT: negative  •	Respiratory and Thorax: negative  •	Cardiovascular: negative  •	Gastrointestinal: negative  •	Genitourinary: negative  •	Musculoskeletal: negative  •	Neurological: negative  •	Psychiatric: negative  •	Hematology/Lymphatics: negative  •	Endocrine: negative  •	Allergic/Immunologic: negative    Physical Exam:   • Constitutional:	Well-developed, well nourished  • Eyes:	EOMI; PERRL; no drainage or redness  • ENMT:	No oral lesions; no gross abnormalities  • Neck	no thyromegaly or nodules  • Breasts:	not examined  • Back:	No deformity or limitation of movement  • Respiratory:	Breath Sounds equal & clear to auscultation, no accessory muscle use  • Cardiovascular:	Regular rate & rhythm, normal S1, S2; no murmurs, gallops or rubs; no S3, S4  • Gastrointestinal:	Soft, non-tender, no hepatosplenomegaly, normal bowel sounds  • Genitourinary:	not examined  • Rectal: not examined  • Extremities:	No cyanosis, clubbing or edema, bilateral legs dressing intact, wound vac in place.   • Vascular:	Equal and normal pulses (dorsalis pedis)  • Neurologica:l	not examined  • Skin:	No lesions; no rash  • Lymph Nodes:	No lymphadedenopathy  • Musculoskeletal:	No joint pain, swelling or deformity; no limitation of movement        LABS:  ABG - ( 18 Sep 2020 21:36 )  pH, Arterial: 7.34  pH, Blood: x     /  pCO2: 32    /  pO2: 76    / HCO3: 17    / Base Excess: -7.9  /  SaO2: 94                  CBC Full  -  ( 19 Sep 2020 06:42 )  WBC Count : 15.48 K/uL  RBC Count : 4.04 M/uL  Hemoglobin : 8.9 g/dL  Hematocrit : 29.5 %  Platelet Count - Automated : 243 K/uL  Mean Cell Volume : 73.0 fl  Mean Cell Hemoglobin : 22.0 pg  Mean Cell Hemoglobin Concentration : 30.2 gm/dL  Auto Neutrophil # : x  Auto Lymphocyte # : x  Auto Monocyte # : x  Auto Eosinophil # : x  Auto Basophil # : x  Auto Neutrophil % : x  Auto Lymphocyte % : x  Auto Monocyte % : x  Auto Eosinophil % : x  Auto Basophil % : x    09-18    135  |  109<H>  |  27<H>  ----------------------------<  88  4.2   |  16<L>  |  1.02    Ca    7.7<L>      18 Sep 2020 21:25  Phos  3.1     09-19  Mg     1.8     09-19    TPro  5.2<L>  /  Alb  2.3<L>  /  TBili  1.0  /  DBili  x   /  AST  21  /  ALT  15  /  AlkPhos  126<H>  09-18    PT/INR - ( 19 Sep 2020 06:42 )   PT: 19.8 sec;   INR: 1.69          PTT - ( 19 Sep 2020 06:42 )  PTT:28.5 sec      Urinalysis Basic - ( 17 Sep 2020 17:15 )    Color: Yellow / Appearance: Clear / SG: >=1.030 / pH: x  Gluc: x / Ketone: Trace mg/dL  / Bili: Small / Urobili: 0.2 E.U./dL   Blood: x / Protein: 100 mg/dL / Nitrite: NEGATIVE   Leuk Esterase: NEGATIVE / RBC: < 5 /HPF / WBC < 5 /HPF   Sq Epi: x / Non Sq Epi: 0-5 /HPF / Bacteria: Present /HPF              Culture Results:   No growth to date (09-17 @ 18:33)  Culture Results:   No growth at 1 day. (09-17 @ 09:51)  Culture Results:   No growth at 1 day. (09-17 @ 09:24)      RADIOLOGY & ADDITIONAL STUDIES (The following images were personally reviewed):  Loja:                                     No  Urine output:                       adequate  DVT prophylaxis:                 Yes  Flattus:                                  Yes  Bowel movement:              No

## 2020-09-19 NOTE — PROGRESS NOTE ADULT - SUBJECTIVE AND OBJECTIVE BOX
Ortho Note    Pt in good spirits this AM, in moderate pain. Denies any numbness/tingling. Pt is happy surgery went well.  Denies CP, SOB, N/V    Vital Signs Last 24 Hrs  T(C): 36.8 (19 Sep 2020 05:22), Max: 37 (19 Sep 2020 00:24)  T(F): 98.3 (19 Sep 2020 05:22), Max: 98.6 (19 Sep 2020 00:24)  HR: 98 (19 Sep 2020 08:00) (84 - 106)  BP: 113/55 (19 Sep 2020 08:00) (105/56 - 128/68)  BP(mean): 77 (19 Sep 2020 08:00) (77 - 91)  RR: 22 (19 Sep 2020 08:00) (10 - 24)  SpO2: 98% (19 Sep 2020 08:00) (92% - 98%)    RLE:  DSG- R hip incision C/D/i;   R knee Wound vac holding suction at 125, MIS x1, HV x1, in KI, proximal donor site covered in tegaderm  Pulses: +DP;  feet cool to touch; at baseline; no edema  Sensation: Sensation intact to baseline (diminished sensation- h/o severe neuropathy; follows with vascular Dr. Malloy outpatient)  Motor: 5/5 EHL/FHL    LLE- Wound vac holding suction at 125  Pulses: feet cool to touch; at baseline; no edema  Sensation: SILT to baseline (diminished sensation- h/o severe neuropathy; follows with vascular Dr. Malloy outpatient)  Motor: 5/5 EHL/FHL/TA/GS               A/P: 65yMale s/p R Knee Revision Antibiotic Spacer by Dr. Castro, R Knee medial gastroc flap and LLE STSG by Dr. Mckeon on 9/18  - Transferred to SICU postop, no events overnight, anticipate step-down today to [facility]  - s/p 3u PRBC intraop due to intraop blood loss, postop hgb 9.0, AM hgb 8.9, will continue to monitor  - Pain Control, continue pain mgmt recs  - DVT ppx: ASA + effient (home med) restarted today  - PT, WBS: TTWB RLE in KI at all times, Strict bedrest x7 days as per Dr. Mckeon for RLE flap healing  - continue bowel regimen  - appreciate ID recs- Continue dapto, cefepime, rifampin; check CPK 9/21 with dapto; f/o CBC with diff and CMP (elevated liver enzymes in past with rifampin)  - appreciate internal medicine, and cardiology recs, resumed all pre-op meds  - appreciate plastics recs- wound vacs staying on x7 days  - appreciate gen surg recs- will need cholecystectomy (elective) in near future, currently no abd pain, n/v; dressing changes as per nursing; no acute drainage from RUQ site  - appreciate psych recs- currently feels well on regimen today, will reconsult as needed  - podiatry following for R foot diabetic ulcers x2- debrided at bedside; bactroban applied and dsg changes as per podiatry  - dispo: pending  - ADAT      Ortho Pager [phone]

## 2020-09-19 NOTE — PROGRESS NOTE ADULT - ATTENDING COMMENTS
Feels good today. Pain under control.     Bilateral leg dressings intact  Knee immobilizer in place on right  Significant sanguinous drainage o/n from the Hemovac  Feet cool, pale, and pulseless per his baseline  Moving toes    Plan as above  Hemovac to stay in pending improvement in drainage  Right knee / left leg wound management as per Dr. Mckeon  I emphasized the importance of strict adherence to bedrest/KI/WB protocols with him and he verbalized agreement  SICU care appreciated. Call me with any questions.    Jose Oh  [phone]

## 2020-09-19 NOTE — PROGRESS NOTE ADULT - SUBJECTIVE AND OBJECTIVE BOX
INTERVAL HPI/OVERNIGHT EVENTS: Feels well. Frustrated with the number of surgeries and procedures he has had to undergo.     CONSTITUTIONAL:  Negative fever or chills, feels well, good appetite  EYES:  Negative  blurry vision or double vision  CARDIOVASCULAR:  Negative for chest pain or palpitations  RESPIRATORY:  Negative for cough, wheezing, or SOB   GASTROINTESTINAL:  Negative for nausea, vomiting, diarrhea, constipation, or abdominal pain  GENITOURINARY:  Negative frequency, urgency or dysuria  NEUROLOGIC:  No headache, confusion, dizziness, lightheadedness      ANTIBIOTICS/RELEVANT:    MEDICATIONS  (STANDING):  aspirin enteric coated 81 milliGRAM(s) Oral daily  cefepime   IVPB 2000 milliGRAM(s) IV Intermittent every 8 hours  DAPTOmycin IVPB 650 milliGRAM(s) IV Intermittent every 24 hours  dextrose 5%. 1000 milliLiter(s) (50 mL/Hr) IV Continuous <Continuous>  dextrose 50% Injectable 12.5 Gram(s) IV Push once  dextrose 50% Injectable 25 Gram(s) IV Push once  dextrose 50% Injectable 25 Gram(s) IV Push once  digoxin     Tablet 0.125 milliGRAM(s) Oral daily  gabapentin 100 milliGRAM(s) Oral three times a day  HYDROmorphone PCA (1 mG/mL) 30 milliLiter(s) PCA Continuous PCA Continuous  insulin lispro (HumaLOG) corrective regimen sliding scale   SubCutaneous three times a day before meals  lactated ringers. 1000 milliLiter(s) (40 mL/Hr) IV Continuous <Continuous>  metoprolol succinate ER 25 milliGRAM(s) Oral daily  prasugrel 10 milliGRAM(s) Oral daily  rifAMPin 300 milliGRAM(s) Oral every 12 hours  spironolactone 25 milliGRAM(s) Oral daily    MEDICATIONS  (PRN):  cyclobenzaprine 5 milliGRAM(s) Oral three times a day PRN Muscle Spasm  dextrose 40% Gel 15 Gram(s) Oral once PRN Blood Glucose LESS THAN 70 milliGRAM(s)/deciliter  glucagon  Injectable 1 milliGRAM(s) IntraMuscular once PRN Glucose LESS THAN 70 milligrams/deciliter  morphine  - Injectable 2 milliGRAM(s) IV Push every 4 hours PRN Breakthrough  morphine  IR 15 milliGRAM(s) Oral every 4 hours PRN Moderate Pain (4 - 6)  morphine  IR 30 milliGRAM(s) Oral every 4 hours PRN Severe Pain (7 - 10)  naloxone Injectable 0.1 milliGRAM(s) IV Push every 3 minutes PRN For ANY of the following changes in patient status:  A. RR LESS THAN 10 breaths per minute, B. Oxygen saturation LESS THAN 90%, C. Sedation score of 6  ondansetron Injectable 4 milliGRAM(s) IV Push every 6 hours PRN Nausea        Vital Signs Last 24 Hrs  T(C): 37.3 (19 Sep 2020 13:34), Max: 37.3 (19 Sep 2020 13:34)  T(F): 99.2 (19 Sep 2020 13:34), Max: 99.2 (19 Sep 2020 13:34)  HR: 89 (19 Sep 2020 13:34) (84 - 106)  BP: 104/54 (19 Sep 2020 13:34) (88/50 - 128/68)  BP(mean): 71 (19 Sep 2020 13:15) (65 - 91)  RR: 17 (19 Sep 2020 13:34) (10 - 39)  SpO2: 100% (19 Sep 2020 13:34) (92% - 100%)    PHYSICAL EXAM:  Constitutional:Well-developed, well nourished  Eyes:DAMARIS, EOMI  Ear/Nose/Throat: no oral lesion, no sinus tenderness on percussion	  Neck:no JVD, no lymphadenopathy, supple  Respiratory: CTA colton  Cardiovascular: S1S2 RRR, no murmurs  Gastrointestinal:soft, (+) BS, no HSM  Extremities:no e/e/c  Vascular: DP Pulse:	right normal; left normal      LABS:                        8.9    15.48 )-----------( 243      ( 19 Sep 2020 06:42 )             29.5     09-19    135  |  107  |  26<H>  ----------------------------<  119<H>  4.8   |  15<L>  |  1.00    Ca    7.9<L>      19 Sep 2020 06:42  Phos  3.1     09-19  Mg     1.8     09-19    TPro  5.2<L>  /  Alb  2.3<L>  /  TBili  1.0  /  DBili  x   /  AST  21  /  ALT  15  /  AlkPhos  126<H>  09-18    PT/INR - ( 19 Sep 2020 06:42 )   PT: 19.8 sec;   INR: 1.69          PTT - ( 19 Sep 2020 06:42 )  PTT:28.5 sec  Urinalysis Basic - ( 17 Sep 2020 17:15 )    Color: Yellow / Appearance: Clear / SG: >=1.030 / pH: x  Gluc: x / Ketone: Trace mg/dL  / Bili: Small / Urobili: 0.2 E.U./dL   Blood: x / Protein: 100 mg/dL / Nitrite: NEGATIVE   Leuk Esterase: NEGATIVE / RBC: < 5 /HPF / WBC < 5 /HPF   Sq Epi: x / Non Sq Epi: 0-5 /HPF / Bacteria: Present /HPF        MICROBIOLOGY: Culture - Surgical Swab (09.18.20 @ 17:30)    Gram Stain:   No organisms seen  Few WBC's    Specimen Source: .Surgical Swab right femur intramedullary #2 or    Culture Results:   No growth to date.    Culture - Blood (09.17.20 @ 09:51)    Specimen Source: .Blood Blood-Venous    Culture Results:   No growth at 2 days.        RADIOLOGY & ADDITIONAL STUDIES: reviewed

## 2020-09-19 NOTE — PROGRESS NOTE ADULT - SUBJECTIVE AND OBJECTIVE BOX
Ortho Post Op Check    Procedure: R Knee Revision Antibiotic Spacer  Surgeon: Dr. Castro    Pt comfortable without complaints, notes mild pain in R thigh.  Denies CP, SOB, N/V, numbness/tingling     Vital Signs Last 24 Hrs  T(C): 37 (09-19-20 @ 00:24), Max: 37 (09-19-20 @ 00:24)  T(F): 98.6 (09-19-20 @ 00:24), Max: 98.6 (09-19-20 @ 00:24)  HR: 97 (09-19-20 @ 01:00) (84 - 98)  BP: 112/59 (09-19-20 @ 01:00) (112/59 - 128/68)  BP(mean): 80 (09-19-20 @ 01:00) (80 - 91)  RR: 14 (09-19-20 @ 01:00) (10 - 20)  SpO2: 93% (09-19-20 @ 01:00) (93% - 97%)  AVSS    General: Pt Alert and oriented, NAD  RLE: In , HV x1, MIS x1, Wound vac x1 holding suction; STSG with tegaderm  Pulses: 2+ distally  Sensation: decreased bilaterally due to diabetic neuropathy, comparable to baseline  Motor: EHL/FHL/TA/GS 5/5, Quad/Psoas unable to assess    LLE: Wound vac x1 holding suction; STSG with tegaderm  Pulses: 2+ distally  Sensation: decreased bilaterally due to diabetic neuropathy, comparable to baseline  Motor: EHL/FHL/TA/GS/Quad/Psoas 5/5        Post-op X-Ray: R Knee spacer in place without evidence of fractures    A/P: 65yMale POD#0 s/p R Knee Antibiotic Spacer revision by Dr. Castro, R Medial gastroc flap and L leg STSG by Dr. Mckeon on 9/18, transferred to SICU post-op for hemodynamic monitoring  - Monitoring as per SICU  - s/p 3u PRBC intraop  - Pain Control as per pain med recs  - DVT ppx: Resume ASA and Effient 9/19  - Post op abx: Dapto, Cefepime, Rifampin as per pre-op  - WBS: TTWB RLE in KI  - Pending PT eval    Ortho Pager [phone]

## 2020-09-19 NOTE — PROGRESS NOTE ADULT - SUBJECTIVE AND OBJECTIVE BOX
Pt seen and examined in ICU.   No acute events overnight.   Comfortable.     NAD. Off pressors.  Surgical dressings c/d/i.   Wound VACs over skin grafts (left and right) with good seal.

## 2020-09-19 NOTE — PROGRESS NOTE ADULT - ASSESSMENT
A/P: Pt doing well POD#1 s/p right knee reconstruction with gastroc flap, left leg skin graft.   1. Maintain knee immobilizer.   2. Bed rest for 7 days. Then toe touch on right side  3. Maintain wound vac for 7 days

## 2020-09-19 NOTE — PROGRESS NOTE ADULT - SUBJECTIVE AND OBJECTIVE BOX
Patient is a 65y old  Male who presents with a chief complaint of R Knee Swelling (19 Sep 2020 09:15)      INTERVAL HPI/ OVERNIGHT EVENTS      LABS                        8.9    15.48 )-----------( 243      ( 19 Sep 2020 06:42 )             29.5     09-18    135  |  109<H>  |  27<H>  ----------------------------<  88  4.2   |  16<L>  |  1.02    Ca    7.7<L>      18 Sep 2020 21:25  Phos  3.1     09-19  Mg     1.8     09-19    TPro  5.2<L>  /  Alb  2.3<L>  /  TBili  1.0  /  DBili  x   /  AST  21  /  ALT  15  /  AlkPhos  126<H>  09-18    PT/INR - ( 19 Sep 2020 06:42 )   PT: 19.8 sec;   INR: 1.69          PTT - ( 19 Sep 2020 06:42 )  PTT:28.5 sec    ICU Vital Signs Last 24 Hrs  T(C): 36.8 (19 Sep 2020 05:22), Max: 37 (19 Sep 2020 00:24)  T(F): 98.3 (19 Sep 2020 05:22), Max: 98.6 (19 Sep 2020 00:24)  HR: 97 (19 Sep 2020 09:01) (84 - 106)  BP: 113/55 (19 Sep 2020 08:00) (105/56 - 128/68)  BP(mean): 77 (19 Sep 2020 08:00) (77 - 91)  ABP: 104/47 (19 Sep 2020 09:01) (104/47 - 143/64)  ABP(mean): 65 (19 Sep 2020 09:01) (65 - 90)  RR: 17 (19 Sep 2020 09:01) (10 - 24)  SpO2: 96% (19 Sep 2020 09:01) (92% - 98%)      RADIOLOGY  < from: Xray Foot AP + Lateral, Bilateral (09.16.20 @ 14:16) >  Examination of the bilateral feet demonstrates severe arthrosis in the right first PIP joint, with valgus deformity. Calcaneal spurs no fractures. Internal fixation in the left first PIP joint. Soft tissue swelling in the plantar aspect of the left midfoot, without associated adjacent osseous abnormality. No fractures.    < end of copied text >    MICROBIOLOGY    PHYSICAL EXAM  Lower Extremity Focused  Vasc: DP/PT 1-4 b/l, +2 nisa malleolar and dorsum of the foot pitting edema b/l, CFT <3sec b/l  Derm: Right foot - 2nd digit ulcer at tip of toe, no drainage, no malodor, hallux maceration improving; small circular lesions present throughout his leg d/t to injuries related to his cast, Tinea Pedia b/l, onychomycosis.   Neuro: Protective sensations not intact from the distal tibia down (stocking distribution)  MSK: Partial amp of digits 2 and 3 on L foot, hammertoes on digits 2-5 on R foot, Hallux rigidus b/l Patient is a 65y old  Male who presents with a chief complaint of R Knee Swelling (19 Sep 2020 09:15)      INTERVAL HPI/ OVERNIGHT EVENTS: Pt seen and examined at bedside. He spent the night in [facility] s/p knee surgery. The pt states that he is doing well and does not complain of N/V/F.      LABS                        8.9    15.48 )-----------( 243      ( 19 Sep 2020 06:42 )             29.5     09-18    135  |  109<H>  |  27<H>  ----------------------------<  88  4.2   |  16<L>  |  1.02    Ca    7.7<L>      18 Sep 2020 21:25  Phos  3.1     09-19  Mg     1.8     09-19    TPro  5.2<L>  /  Alb  2.3<L>  /  TBili  1.0  /  DBili  x   /  AST  21  /  ALT  15  /  AlkPhos  126<H>  09-18    PT/INR - ( 19 Sep 2020 06:42 )   PT: 19.8 sec;   INR: 1.69          PTT - ( 19 Sep 2020 06:42 )  PTT:28.5 sec    ICU Vital Signs Last 24 Hrs  T(C): 36.8 (19 Sep 2020 05:22), Max: 37 (19 Sep 2020 00:24)  T(F): 98.3 (19 Sep 2020 05:22), Max: 98.6 (19 Sep 2020 00:24)  HR: 97 (19 Sep 2020 09:01) (84 - 106)  BP: 113/55 (19 Sep 2020 08:00) (105/56 - 128/68)  BP(mean): 77 (19 Sep 2020 08:00) (77 - 91)  ABP: 104/47 (19 Sep 2020 09:01) (104/47 - 143/64)  ABP(mean): 65 (19 Sep 2020 09:01) (65 - 90)  RR: 17 (19 Sep 2020 09:01) (10 - 24)  SpO2: 96% (19 Sep 2020 09:01) (92% - 98%)      RADIOLOGY  < from: Xray Foot AP + Lateral, Bilateral (09.16.20 @ 14:16) >  Examination of the bilateral feet demonstrates severe arthrosis in the right first PIP joint, with valgus deformity. Calcaneal spurs no fractures. Internal fixation in the left first PIP joint. Soft tissue swelling in the plantar aspect of the left midfoot, without associated adjacent osseous abnormality. No fractures.    < end of copied text >    MICROBIOLOGY    PHYSICAL EXAM  Lower Extremity Focused  Vasc: DP/PT 1-4 b/l, +2 nisa malleolar and dorsum of the foot pitting edema b/l, CFT <3sec b/l  Derm: Right foot - 2nd digit ulcer at tip of toe, no drainage, no malodor, hallux maceration improving; small circular lesions present throughout his leg d/t to injuries related to his cast, Tinea Pedia b/l, onychomycosis.   Neuro: Protective sensations not intact from the distal tibia down (stocking distribution)  MSK: Partial amp of digits 2 and 3 on L foot, hammertoes on digits 2-5 on R foot, Hallux rigidus b/l

## 2020-09-19 NOTE — PROGRESS NOTE ADULT - ASSESSMENT
Patient is a 65y old  Male who presents with a chief complaint of R Knee Swelling scheduled for a R knee abx spacer exchange and gastroc flap procedure s/p pyogenic arthritis this Friday. He has requested a podiatry consult for diabetic foot ulcers on the right foot. Right foot found to have Rose grade 1 ulcers on 2nd and 3rd digits.    Plan:  Bactroban applied to digital wounds  Betadine applied to posterior aspect of the base of the hallux   Dressed the foot in DSD and advised patient to avoid pressure to the ulcer sites  Plan discussed with Dr. Davalos  Podiatry will continue to follow    Patient is a 65y old  Male who presents with a chief complaint of R Knee Swelling scheduled for a R knee abx spacer exchange and gastroc flap procedure s/p pyogenic arthritis this Friday. He has requested a podiatry consult for diabetic foot ulcers on the right foot. Right foot found to have Rose grade 1 ulcers on 2nd and 3rd digits.    Plan:  Ulcer site does not have any drainage and looks dry and closed  Applied betadine to the plantar aspect of L foot  Plan discussed with Dr. Davalos  Podiatry will continue to monitor

## 2020-09-19 NOTE — PROGRESS NOTE ADULT - ASSESSMENT
65yMale s/p R knee antibiotic spacer 07/22 who presented with R knee swelling and RUQ wound drainage on 09/01/2020 and R hip exchange of hardware on 09/04/2020.  s/p right knee spacer revision and right medical gastroc flap and left STSG on 9/18/2020.

## 2020-09-19 NOTE — PROGRESS NOTE ADULT - ASSESSMENT
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  He has completed 6 weeks of vancomycin & rifampin.  He is s/p R hip exchange of hardware on 9/4 - OR cultures NGTD.  I&D of abd wall done 9/4 - cultures growing Pseudomonas and Enterococcus faecium. Gallium SPECT done 9/10 showed minimal uptake from GB fossa to soft tissue and was interpreted by surgery as a negative study. Recent leukocytosis--bcx 9/17 ngtd. Now s/p R knee spacer exchange, muscle flap and L shin ulcer debridement on 9/18--OR cx ngtd.  - continue daptomycin, rifampin, cefepime     Dr. Casillas/ID Team 2 resumes care Monday 9/21

## 2020-09-19 NOTE — PROGRESS NOTE ADULT - SUBJECTIVE AND OBJECTIVE BOX
S: Pt reports feeling well this morning, no HA/CP/SOB.     Vitals: ICU Vital Signs Last 24 Hrs  T(C): 36.8 (19 Sep 2020 05:22), Max: 37 (19 Sep 2020 00:24)  T(F): 98.3 (19 Sep 2020 05:22), Max: 98.6 (19 Sep 2020 00:24)  HR: 97 (19 Sep 2020 10:15) (84 - 106)  BP: 101/72 (19 Sep 2020 10:15) (101/72 - 128/68)  BP(mean): 83 (19 Sep 2020 10:15) (77 - 91)  ABP: 104/47 (19 Sep 2020 09:01) (104/47 - 143/64)  ABP(mean): 65 (19 Sep 2020 09:01) (65 - 90)  RR: 39 (19 Sep 2020 10:15) (10 - 39)  SpO2: 98% (19 Sep 2020 10:15) (92% - 98%)            I&O:  I&O's Detail    18 Sep 2020 07:01  -  19 Sep 2020 07:00  --------------------------------------------------------  IN:    Lactated Ringers: 200 mL    Lactated Ringers: 360 mL  Total IN: 560 mL    OUT:    Accordian (mL): 220 mL    Bulb (mL): 30 mL    Indwelling Catheter - Urethral (mL): 920 mL    VAC (Vacuum Assisted Closure) System (mL): 2 mL    VAC (Vacuum Assisted Closure) System (mL): 25 mL  Total OUT: 1197 mL    Total NET: -637 mL      19 Sep 2020 07:01  -  19 Sep 2020 10:35  --------------------------------------------------------  IN:    IV PiggyBack: 50 mL    Lactated Ringers: 100 mL  Total IN: 150 mL    OUT:    Indwelling Catheter - Urethral (mL): 215 mL  Total OUT: 215 mL    Total NET: -65 mL            ABG - ( 18 Sep 2020 21:36 )  pH, Arterial: 7.34  pH, Blood: x     /  pCO2: 32    /  pO2: 76    / HCO3: 17    / Base Excess: -7.9  /  SaO2: 94              CAPILLARY BLOOD GLUCOSE      POCT Blood Glucose.: 105 mg/dL (19 Sep 2020 06:06)  POCT Blood Glucose.: 83 mg/dL (18 Sep 2020 21:57)  POCT Blood Glucose.: 75 mg/dL (18 Sep 2020 19:44)  POCT Blood Glucose.: 97 mg/dL (18 Sep 2020 17:18)  POCT Blood Glucose.: 100 mg/dL (18 Sep 2020 15:50)  POCT Blood Glucose.: 90 mg/dL (18 Sep 2020 12:04)      Labs:                         8.9    15.48 )-----------( 243      ( 19 Sep 2020 06:42 )             29.5   09-19    135  |  107  |  26<H>  ----------------------------<  119<H>  4.8   |  15<L>  |  1.00    Ca    7.9<L>      19 Sep 2020 06:42  Phos  3.1     09-19  Mg     1.8     09-19    TPro  5.2<L>  /  Alb  2.3<L>  /  TBili  1.0  /  DBili  x   /  AST  21  /  ALT  15  /  AlkPhos  126<H>  09-18  PT/INR - ( 19 Sep 2020 06:42 )   PT: 19.8 sec;   INR: 1.69          PTT - ( 19 Sep 2020 06:42 )  PTT:28.5 secUrinalysis Basic - ( 17 Sep 2020 17:15 )    Color: Yellow / Appearance: Clear / SG: >=1.030 / pH: x  Gluc: x / Ketone: Trace mg/dL  / Bili: Small / Urobili: 0.2 E.U./dL   Blood: x / Protein: 100 mg/dL / Nitrite: NEGATIVE   Leuk Esterase: NEGATIVE / RBC: < 5 /HPF / WBC < 5 /HPF   Sq Epi: x / Non Sq Epi: 0-5 /HPF / Bacteria: Present /HPF      ABG - ( 18 Sep 2020 21:36 )  pH, Arterial: 7.34  pH, Blood: x     /  pCO2: 32    /  pO2: 76    / HCO3: 17    / Base Excess: -7.9  /  SaO2: 94                  Electrolytes repleated to goals as follows: Potassium 4, Phosphorus 3 and Magnesium 2 where appropriate and necessary    Exam:  Neuro: A&Ox3, NAD, grossly neuro intact  HEENT: PERRL < EOMI, MMM  Neck: Supple  CV: RRR, no MRGs  Pulm: CTAB, no wheezes, rales, or rhonchi  Abd: BS (+), Soft, NT, ND  : Loja in place  Ext: No edema peripherally or centrally  Vasc: Extremities warm to palpation, LLE with wv suction intact, wound edges c/d, R LE with dressing and no visible drainage.   MSK: no joint swelling  Skin: no rash  Psych: affect appropriate

## 2020-09-20 LAB
ANION GAP SERPL CALC-SCNC: 10 MMOL/L — SIGNIFICANT CHANGE UP (ref 5–17)
BUN SERPL-MCNC: 24 MG/DL — HIGH (ref 7–23)
CALCIUM SERPL-MCNC: 8 MG/DL — LOW (ref 8.4–10.5)
CHLORIDE SERPL-SCNC: 104 MMOL/L — SIGNIFICANT CHANGE UP (ref 96–108)
CO2 SERPL-SCNC: 18 MMOL/L — LOW (ref 22–31)
CREAT SERPL-MCNC: 1 MG/DL — SIGNIFICANT CHANGE UP (ref 0.5–1.3)
GLUCOSE BLDC GLUCOMTR-MCNC: 185 MG/DL — HIGH (ref 70–99)
GLUCOSE BLDC GLUCOMTR-MCNC: 271 MG/DL — HIGH (ref 70–99)
GLUCOSE SERPL-MCNC: 200 MG/DL — HIGH (ref 70–99)
HCT VFR BLD CALC: 28.1 % — LOW (ref 39–50)
HGB BLD-MCNC: 8.4 G/DL — LOW (ref 13–17)
MAGNESIUM SERPL-MCNC: 1.8 MG/DL — SIGNIFICANT CHANGE UP (ref 1.6–2.6)
MCHC RBC-ENTMCNC: 21.9 PG — LOW (ref 27–34)
MCHC RBC-ENTMCNC: 29.9 GM/DL — LOW (ref 32–36)
MCV RBC AUTO: 73.2 FL — LOW (ref 80–100)
NRBC # BLD: 0 /100 WBCS — SIGNIFICANT CHANGE UP (ref 0–0)
PHOSPHATE SERPL-MCNC: 2.5 MG/DL — SIGNIFICANT CHANGE UP (ref 2.5–4.5)
PLATELET # BLD AUTO: 234 K/UL — SIGNIFICANT CHANGE UP (ref 150–400)
POTASSIUM SERPL-MCNC: 4.4 MMOL/L — SIGNIFICANT CHANGE UP (ref 3.5–5.3)
POTASSIUM SERPL-SCNC: 4.4 MMOL/L — SIGNIFICANT CHANGE UP (ref 3.5–5.3)
RBC # BLD: 3.84 M/UL — LOW (ref 4.2–5.8)
RBC # FLD: 22.4 % — HIGH (ref 10.3–14.5)
SODIUM SERPL-SCNC: 132 MMOL/L — LOW (ref 135–145)
WBC # BLD: 16.54 K/UL — HIGH (ref 3.8–10.5)
WBC # FLD AUTO: 16.54 K/UL — HIGH (ref 3.8–10.5)

## 2020-09-20 PROCEDURE — 99232 SBSQ HOSP IP/OBS MODERATE 35: CPT

## 2020-09-20 RX ORDER — TAMSULOSIN HYDROCHLORIDE 0.4 MG/1
0.4 CAPSULE ORAL AT BEDTIME
Refills: 0 | Status: DISCONTINUED | OUTPATIENT
Start: 2020-09-20 | End: 2020-10-03

## 2020-09-20 RX ORDER — HYDROMORPHONE HYDROCHLORIDE 2 MG/ML
0.5 INJECTION INTRAMUSCULAR; INTRAVENOUS; SUBCUTANEOUS ONCE
Refills: 0 | Status: DISCONTINUED | OUTPATIENT
Start: 2020-09-20 | End: 2020-09-20

## 2020-09-20 RX ADMIN — SODIUM CHLORIDE 40 MILLILITER(S): 9 INJECTION, SOLUTION INTRAVENOUS at 06:26

## 2020-09-20 RX ADMIN — TAMSULOSIN HYDROCHLORIDE 0.4 MILLIGRAM(S): 0.4 CAPSULE ORAL at 21:41

## 2020-09-20 RX ADMIN — GABAPENTIN 100 MILLIGRAM(S): 400 CAPSULE ORAL at 13:02

## 2020-09-20 RX ADMIN — CEFEPIME 100 MILLIGRAM(S): 1 INJECTION, POWDER, FOR SOLUTION INTRAMUSCULAR; INTRAVENOUS at 17:30

## 2020-09-20 RX ADMIN — Medication 0.12 MILLIGRAM(S): at 07:21

## 2020-09-20 RX ADMIN — SPIRONOLACTONE 25 MILLIGRAM(S): 25 TABLET, FILM COATED ORAL at 06:27

## 2020-09-20 RX ADMIN — CEFEPIME 100 MILLIGRAM(S): 1 INJECTION, POWDER, FOR SOLUTION INTRAMUSCULAR; INTRAVENOUS at 10:29

## 2020-09-20 RX ADMIN — DAPTOMYCIN 126 MILLIGRAM(S): 500 INJECTION, POWDER, LYOPHILIZED, FOR SOLUTION INTRAVENOUS at 00:33

## 2020-09-20 RX ADMIN — Medication 81 MILLIGRAM(S): at 10:31

## 2020-09-20 RX ADMIN — GABAPENTIN 100 MILLIGRAM(S): 400 CAPSULE ORAL at 21:42

## 2020-09-20 RX ADMIN — CEFEPIME 100 MILLIGRAM(S): 1 INJECTION, POWDER, FOR SOLUTION INTRAMUSCULAR; INTRAVENOUS at 00:41

## 2020-09-20 RX ADMIN — PRASUGREL 10 MILLIGRAM(S): 5 TABLET, FILM COATED ORAL at 10:31

## 2020-09-20 RX ADMIN — Medication 25 MILLIGRAM(S): at 06:27

## 2020-09-20 RX ADMIN — GABAPENTIN 100 MILLIGRAM(S): 400 CAPSULE ORAL at 06:27

## 2020-09-20 RX ADMIN — Medication 2: at 08:02

## 2020-09-20 NOTE — PROGRESS NOTE ADULT - ASSESSMENT
A/P 65M s/p placement of Abx spacer to R knee, debridement, right gastrocnemius flap with STSG coverage; also with STSG and VAC to LLE anterior lower leg  - Care per primary  - Continue with VAC without any vac changed for a total of 7 days  - Patient to be on bedrest for 7 days, at which point he can be FWB on LLE and toe touch on RLE.  No contraindication to PT from our perspective, provided that PT occurs in the bed and the patient does not perform dorsiflexion/plantarflexion of the RLE    Aubree Villasenor [id number]

## 2020-09-20 NOTE — PROGRESS NOTE ADULT - SUBJECTIVE AND OBJECTIVE BOX
Patient is a 65y old  Male who presents with a chief complaint of R Knee Swelling (20 Sep 2020 08:33)      INTERVAL HPI/ OVERNIGHT EVENTS      LABS                        8.4    16.54 )-----------( 234      ( 20 Sep 2020 06:59 )             28.1     09-20    132<L>  |  104  |  24<H>  ----------------------------<  200<H>  4.4   |  18<L>  |  1.00    Ca    8.0<L>      20 Sep 2020 06:59  Phos  2.5     09-20  Mg     1.8     09-20    TPro  5.2<L>  /  Alb  2.3<L>  /  TBili  1.0  /  DBili  x   /  AST  21  /  ALT  15  /  AlkPhos  126<H>  09-18    PT/INR - ( 19 Sep 2020 06:42 )   PT: 19.8 sec;   INR: 1.69          PTT - ( 19 Sep 2020 06:42 )  PTT:28.5 sec    ICU Vital Signs Last 24 Hrs  T(C): 36.4 (20 Sep 2020 08:39), Max: 37.5 (20 Sep 2020 00:44)  T(F): 97.6 (20 Sep 2020 08:39), Max: 99.5 (20 Sep 2020 00:44)  HR: 100 (20 Sep 2020 08:39) (89 - 100)  BP: 103/56 (20 Sep 2020 08:39) (88/50 - 114/53)  BP(mean): 71 (19 Sep 2020 13:15) (65 - 83)  ABP: --  ABP(mean): --  RR: 17 (20 Sep 2020 08:39) (15 - 39)  SpO2: 97% (20 Sep 2020 08:39) (94% - 100%)      RADIOLOGY  < from: Xray Foot AP + Lateral, Bilateral (09.16.20 @ 14:16) >  Examination of the bilateral feet demonstrates severe arthrosis in the right first PIP joint, with valgus deformity. Calcaneal spurs no fractures. Internal fixation in the left first PIP joint. Soft tissue swelling in the plantar aspect of the left midfoot, without associated adjacent osseous abnormality. No fractures.    < end of copied text >      MICROBIOLOGY    PHYSICAL EXAM  Lower Extremity Focused  Vasc: DP/PT 1-4 b/l, +2 nisa malleolar and dorsum of the foot pitting edema b/l, CFT <3sec b/l  Derm: Right foot - 2nd digit ulcer at tip of toe, no drainage, no malodor, hallux maceration improving; small circular lesions present throughout his leg d/t to injuries related to his cast, Tinea Pedia b/l, onychomycosis.   Neuro: Protective sensations not intact from the distal tibia down (stocking distribution)  MSK: Partial amp of digits 2 and 3 on L foot, hammertoes on digits 2-5 on R foot, Hallux rigidus b/l   Patient is a 65y old  Male who presents with a chief complaint of R Knee Swelling (20 Sep 2020 08:33)      INTERVAL HPI/ OVERNIGHT EVENTS: No overnight events were reported. Pt was seen and examined at bedside. The patient is presently stable and states that he is doing well after his surgery yesterday. He denies any N/V/F.       LABS                        8.4    16.54 )-----------( 234      ( 20 Sep 2020 06:59 )             28.1     09-20    132<L>  |  104  |  24<H>  ----------------------------<  200<H>  4.4   |  18<L>  |  1.00    Ca    8.0<L>      20 Sep 2020 06:59  Phos  2.5     09-20  Mg     1.8     09-20    TPro  5.2<L>  /  Alb  2.3<L>  /  TBili  1.0  /  DBili  x   /  AST  21  /  ALT  15  /  AlkPhos  126<H>  09-18    PT/INR - ( 19 Sep 2020 06:42 )   PT: 19.8 sec;   INR: 1.69          PTT - ( 19 Sep 2020 06:42 )  PTT:28.5 sec    ICU Vital Signs Last 24 Hrs  T(C): 36.4 (20 Sep 2020 08:39), Max: 37.5 (20 Sep 2020 00:44)  T(F): 97.6 (20 Sep 2020 08:39), Max: 99.5 (20 Sep 2020 00:44)  HR: 100 (20 Sep 2020 08:39) (89 - 100)  BP: 103/56 (20 Sep 2020 08:39) (88/50 - 114/53)  BP(mean): 71 (19 Sep 2020 13:15) (65 - 83)  ABP: --  ABP(mean): --  RR: 17 (20 Sep 2020 08:39) (15 - 39)  SpO2: 97% (20 Sep 2020 08:39) (94% - 100%)      RADIOLOGY  < from: Xray Foot AP + Lateral, Bilateral (09.16.20 @ 14:16) >  Examination of the bilateral feet demonstrates severe arthrosis in the right first PIP joint, with valgus deformity. Calcaneal spurs no fractures. Internal fixation in the left first PIP joint. Soft tissue swelling in the plantar aspect of the left midfoot, without associated adjacent osseous abnormality. No fractures.    < end of copied text >      MICROBIOLOGY    PHYSICAL EXAM  Lower Extremity Focused  Vasc: DP/PT 1-4 b/l, +2 nisa malleolar and dorsum of the foot pitting edema b/l, CFT <3sec b/l  Derm: Right foot - 2nd digit ulcer at tip of toe, no drainage, no malodor, hallux maceration improving; small circular lesions present throughout his leg d/t to injuries related to his cast, Tinea Pedia b/l, onychomycosis.   Neuro: Protective sensations not intact from the distal tibia down (stocking distribution)  MSK: Partial amp of digits 2 and 3 on L foot, hammertoes on digits 2-5 on R foot, Hallux rigidus b/l   Patient is a 65y old  Male who presents with a chief complaint of R Knee Swelling (20 Sep 2020 08:33)      INTERVAL HPI/ OVERNIGHT EVENTS: No overnight events were reported. Pt was seen and examined at bedside. The patient is presently stable and states that he is doing well after his surgery yesterday. He does not have any pedal complaints at this time and believes his foot looks better. He denies any N/V/F.       LABS                        8.4    16.54 )-----------( 234      ( 20 Sep 2020 06:59 )             28.1     09-20    132<L>  |  104  |  24<H>  ----------------------------<  200<H>  4.4   |  18<L>  |  1.00    Ca    8.0<L>      20 Sep 2020 06:59  Phos  2.5     09-20  Mg     1.8     09-20    TPro  5.2<L>  /  Alb  2.3<L>  /  TBili  1.0  /  DBili  x   /  AST  21  /  ALT  15  /  AlkPhos  126<H>  09-18    PT/INR - ( 19 Sep 2020 06:42 )   PT: 19.8 sec;   INR: 1.69          PTT - ( 19 Sep 2020 06:42 )  PTT:28.5 sec    ICU Vital Signs Last 24 Hrs  T(C): 36.4 (20 Sep 2020 08:39), Max: 37.5 (20 Sep 2020 00:44)  T(F): 97.6 (20 Sep 2020 08:39), Max: 99.5 (20 Sep 2020 00:44)  HR: 100 (20 Sep 2020 08:39) (89 - 100)  BP: 103/56 (20 Sep 2020 08:39) (88/50 - 114/53)  BP(mean): 71 (19 Sep 2020 13:15) (65 - 83)  ABP: --  ABP(mean): --  RR: 17 (20 Sep 2020 08:39) (15 - 39)  SpO2: 97% (20 Sep 2020 08:39) (94% - 100%)      RADIOLOGY  < from: Xray Foot AP + Lateral, Bilateral (09.16.20 @ 14:16) >  Examination of the bilateral feet demonstrates severe arthrosis in the right first PIP joint, with valgus deformity. Calcaneal spurs no fractures. Internal fixation in the left first PIP joint. Soft tissue swelling in the plantar aspect of the left midfoot, without associated adjacent osseous abnormality. No fractures.    < end of copied text >      MICROBIOLOGY    PHYSICAL EXAM  Lower Extremity Focused  Vasc: DP/PT 1-4 b/l, +2 nisa malleolar and dorsum of the foot pitting edema b/l, CFT <3sec b/l  Derm: Right foot - 2nd digit ulcer at tip of toe, no drainage, no malodor, hallux maceration improving; small circular lesions present throughout his leg d/t to injuries related to his cast, Tinea Pedia b/l, onychomycosis.   Neuro: Protective sensations not intact from the distal tibia down (stocking distribution)  MSK: Partial amp of digits 2 and 3 on L foot, hammertoes on digits 2-5 on R foot, Hallux rigidus b/l

## 2020-09-20 NOTE — PROGRESS NOTE ADULT - SUBJECTIVE AND OBJECTIVE BOX
Ortho Note    Pt in good spirits this AM, in moderate pain. Denies any numbness/tingling. Complains of some soreness over R knee but otherwise no acute distress  Denies CP, SOB, N/V    Vital Signs Last 24 Hrs  T(C): 36.4 (20 Sep 2020 08:39), Max: 37.5 (20 Sep 2020 00:44)  T(F): 97.6 (20 Sep 2020 08:39), Max: 99.5 (20 Sep 2020 00:44)  HR: 100 (20 Sep 2020 08:39) (89 - 100)  BP: 103/56 (20 Sep 2020 08:39) (88/50 - 114/53)  BP(mean): 71 (19 Sep 2020 13:15) (65 - 83)  RR: 17 (20 Sep 2020 08:39) (15 - 39)  SpO2: 97% (20 Sep 2020 08:39) (94% - 100%)    RLE:  DSG  R knee Wound vac holding suction at 125, MIS x1, HV x1, in KI, proximal donor site covered in tegaderm  Pulses: +DP;  feet cool to touch; at baseline; no edema  Sensation: Sensation intact to baseline (diminished sensation- h/o severe neuropathy; follows with vascular Dr. Malloy outpatient)  Motor: 5/5 EHL/FHL    LLE- Wound vac holding suction at 125  Pulses: feet cool to touch; at baseline; no edema  Sensation: SILT to baseline (diminished sensation- h/o severe neuropathy; follows with vascular Dr. Malloy outpatient)  Motor: 5/5 EHL/FHL/TA/GS               A/P: 65yMale s/p R Knee Revision Antibiotic Spacer by Dr. Castro, R Knee medial gastroc flap and LLE STSG by Dr. Mckeon on 9/18  - Transferred to SICU postop, no events overnight, anticipate step-down today to [facility]  - s/p 3u PRBC intraop due to intraop blood loss, postop hgb 9.0, AM hgb 8.9, Hgb This AM 8.4, will continue to monitor;   - Pain Control, continue pain mgmt recs  - DVT ppx: ASA + effient (home med) restarted 9/19  - WBS: TTWB RLE in KI at all times, Strict bedrest x7 days as per Dr. Mckeon for RLE flap healing  - Can have PT evaluate but in bed exercises only  - continue bowel regimen  - appreciate ID recs- Continue dapto, cefepime, rifampin; check CPK 9/21 with dapto; f/o CBC with diff and CMP (elevated liver enzymes in past with rifampin)  - appreciate internal medicine, and cardiology recs, resumed all pre-op meds  - appreciate plastics recs- wound vacs staying on x7 days until 9/25  - appreciate gen surg recs- will need cholecystectomy (elective) in near future, currently no abd pain, n/v; dressing changes as per nursing; no acute drainage from RUQ site  - appreciate psych recs- currently feels well on regimen today, will reconsult as needed  - podiatry following for R foot diabetic ulcers x2- debrided at bedside; bactroban applied and dsg changes as per podiatry  - dispo: pending  - ADAT      Ortho Pager [phone]

## 2020-09-20 NOTE — PROGRESS NOTE ADULT - ASSESSMENT
Patient is a 65y old  Male who presents with a chief complaint of R Knee Swelling scheduled for a R knee abx spacer exchange and gastroc flap procedure s/p pyogenic arthritis this Friday. He has requested a podiatry consult for diabetic foot ulcers on the right foot. Right foot found to have Rose grade 1 ulcers on 2nd and 3rd digits.    Plan:  Ulcer site does not have any drainage and looks dry and closed  Applied betadine to the plantar aspect of L foot  Plan discussed with Dr. Davalos  Podiatry will continue to monitor    Patient is a 65y old  Male who presents with a chief complaint of R Knee Swelling scheduled for a R knee abx spacer exchange and gastroc flap procedure s/p pyogenic arthritis this Friday. He has requested a podiatry consult for diabetic foot ulcers on the right foot. Right foot found to have Rose grade 1 ulcers on 2nd and 3rd digits.    Plan:  Ulcer site does not have any drainage and looks dry and closed  Possible bedside debridement of the L foot callus on the 2nd digit tomorrow  Plan discussed with Dr. Davalos  Podiatry will continue to monitor

## 2020-09-20 NOTE — PROGRESS NOTE ADULT - SUBJECTIVE AND OBJECTIVE BOX
Interval Events: Reviewed  Patient seen and examined at bedside.    Patient is a 65y old  Male who presents with a chief complaint of R Knee Swelling (19 Sep 2020 15:04)  no acute event overnight, pain controlled with PCA      PAST MEDICAL & SURGICAL HISTORY:  Diabetic neuropathy    STEMI (ST elevation myocardial infarction)    Diverticulitis    MRSA bacteremia    History of celiac disease    CHF (congestive heart failure)  EF ~ 25%    HTN (hypertension)    Diabetes  on insulin pump    Blood clot due to device, implant, or graft  was on blood thinners    HLD (hyperlipidemia)    Osteoarthritis    Atherosclerosis of coronary artery  CAD (coronary artery disease)    Status post percutaneous transluminal coronary angioplasty  in 2012    History of open reduction and internal fixation (ORIF) procedure    Surgery, elective  Right shoulder    Surgery, elective  right knee wound debridement    S/P TKR (total knee replacement), right  with infection Mrsa   per pt he was cleared from MRSA infection    S/P CABG x 1  2018    Stented coronary artery  10/18 heart attack  INFERIOR WALL MI    Other postprocedural status  Fixation hardware in foot LEFT        MEDICATIONS:  Pulmonary:    Antimicrobials:  cefepime   IVPB 2000 milliGRAM(s) IV Intermittent every 8 hours  DAPTOmycin IVPB 650 milliGRAM(s) IV Intermittent every 24 hours  rifAMPin 300 milliGRAM(s) Oral every 12 hours    Anticoagulants:  aspirin enteric coated 81 milliGRAM(s) Oral daily  prasugrel 10 milliGRAM(s) Oral daily    Cardiac:  digoxin     Tablet 0.125 milliGRAM(s) Oral daily  metoprolol succinate ER 25 milliGRAM(s) Oral daily  spironolactone 25 milliGRAM(s) Oral daily      Allergies    ACE inhibitors (Hives)  carvedilol (Other)  enalapril (Hives)  Entresto (Other)    Intolerances        Vital Signs Last 24 Hrs  T(C): 37.2 (20 Sep 2020 06:15), Max: 37.5 (20 Sep 2020 00:44)  T(F): 98.9 (20 Sep 2020 06:15), Max: 99.5 (20 Sep 2020 00:44)  HR: 98 (20 Sep 2020 07:19) (89 - 99)  BP: 106/77 (20 Sep 2020 07:19) (88/50 - 114/53)  BP(mean): 71 (19 Sep 2020 13:15) (65 - 83)  RR: 16 (20 Sep 2020 06:15) (15 - 39)  SpO2: 95% (20 Sep 2020 06:15) (94% - 100%)    09-19 @ 07:01 - 09-20 @ 07:00  --------------------------------------------------------  IN: 1080 mL / OUT: 1235 mL / NET: -155 mL    09-20 @ 07:01 - 09-20 @ 08:24  --------------------------------------------------------  IN: 40 mL / OUT: 0 mL / NET: 40 mL          Review of Systems:   •	General: negative  •	Skin/Breast: negative  •	Ophthalmologic: negative  •	ENMT: negative  •	Respiratory and Thorax: negative  •	Cardiovascular: negative  •	Gastrointestinal: negative  •	Genitourinary: negative  •	Musculoskeletal: negative  •	Neurological: negative  •	Psychiatric: negative  •	Hematology/Lymphatics: negative  •	Endocrine: negative  •	Allergic/Immunologic: negative    Physical Exam:   • Constitutional:	Well-developed, well nourished  • Eyes:	EOMI; PERRL; no drainage or redness  • ENMT:	No oral lesions; no gross abnormalities  • Neck	no thyromegaly or nodules  • Breasts:	not examined  • Back:	No deformity or limitation of movement  • Respiratory:	Breath Sounds equal & clear to auscultation, no accessory muscle use  • Cardiovascular:	Regular rate & rhythm, normal S1, S2; no murmurs, gallops or rubs; no S3, S4  • Gastrointestinal:	Soft, non-tender, no hepatosplenomegaly, normal bowel sounds  • Genitourinary:	not examined  • Rectal: not examined  • Extremities:	No cyanosis, clubbing or edema, bilateral leg dressing intact with vac functioning.   • Vascular:	Equal and normal pulses (dorsalis pedis)  • Neurologica:l	not examined  • Skin:	No lesions; no rash  • Lymph Nodes:	No lymphadedenopathy  • Musculoskeletal:	No joint pain, swelling or deformity; no limitation of movement        LABS:  ABG - ( 18 Sep 2020 21:36 )  pH, Arterial: 7.34  pH, Blood: x     /  pCO2: 32    /  pO2: 76    / HCO3: 17    / Base Excess: -7.9  /  SaO2: 94                  CBC Full  -  ( 20 Sep 2020 06:59 )  WBC Count : 16.54 K/uL  RBC Count : 3.84 M/uL  Hemoglobin : 8.4 g/dL  Hematocrit : 28.1 %  Platelet Count - Automated : 234 K/uL  Mean Cell Volume : 73.2 fl  Mean Cell Hemoglobin : 21.9 pg  Mean Cell Hemoglobin Concentration : 29.9 gm/dL  Auto Neutrophil # : x  Auto Lymphocyte # : x  Auto Monocyte # : x  Auto Eosinophil # : x  Auto Basophil # : x  Auto Neutrophil % : x  Auto Lymphocyte % : x  Auto Monocyte % : x  Auto Eosinophil % : x  Auto Basophil % : x    09-20    132<L>  |  104  |  24<H>  ----------------------------<  200<H>  4.4   |  18<L>  |  1.00    Ca    8.0<L>      20 Sep 2020 06:59  Phos  2.5     09-20  Mg     1.8     09-20    TPro  5.2<L>  /  Alb  2.3<L>  /  TBili  1.0  /  DBili  x   /  AST  21  /  ALT  15  /  AlkPhos  126<H>  09-18    PT/INR - ( 19 Sep 2020 06:42 )   PT: 19.8 sec;   INR: 1.69          PTT - ( 19 Sep 2020 06:42 )  PTT:28.5 sec                Culture Results:   No growth to date. (09-18 @ 17:30)  Culture Results:   No growth to date. (09-18 @ 17:30)  Culture Results:   No growth to date. (09-18 @ 17:30)  Culture Results:   No growth to date. (09-18 @ 17:29)  Culture Results:   No growth to date. (09-18 @ 17:29)  Culture Results:   No growth to date. (09-18 @ 17:28)  Culture Results:   No growth to date. (09-18 @ 17:28)      RADIOLOGY & ADDITIONAL STUDIES (The following images were personally reviewed):  Loja:                                     No  Urine output:                       adequate  DVT prophylaxis:                 Yes  Flattus:                                  Yes  Bowel movement:              No

## 2020-09-20 NOTE — PROGRESS NOTE ADULT - SUBJECTIVE AND OBJECTIVE BOX
Plastic Surgery    SUBJECTIVE:   Patient doing well today, pain moderately controlled on PCA.  Some leaking from STSG donor site (this is expected)    VITALS  T(C): 37.2 (09-20-20 @ 06:15), Max: 37.5 (09-20-20 @ 00:44)  HR: 98 (09-20-20 @ 07:19) (89 - 99)  BP: 106/77 (09-20-20 @ 07:19) (88/50 - 114/53)  RR: 16 (09-20-20 @ 06:15) (15 - 39)  SpO2: 95% (09-20-20 @ 06:15) (94% - 100%)  CAPILLARY BLOOD GLUCOSE      POCT Blood Glucose.: 185 mg/dL (20 Sep 2020 07:27)  POCT Blood Glucose.: 165 mg/dL (19 Sep 2020 22:14)  POCT Blood Glucose.: 133 mg/dL (19 Sep 2020 17:56)  POCT Blood Glucose.: 160 mg/dL (19 Sep 2020 11:13)      Is/Os    09-19 @ 07:01  -  09-20 @ 07:00  --------------------------------------------------------  IN:    IV PiggyBack: 50 mL    IV PiggyBack: 150 mL    Lactated Ringers: 880 mL  Total IN: 1080 mL    OUT:    Accordian (mL): 305 mL    Bulb (mL): 100 mL    Indwelling Catheter - Urethral (mL): 215 mL    VAC (Vacuum Assisted Closure) System (mL): 50 mL    VAC (Vacuum Assisted Closure) System (mL): 15 mL    Voided (mL): 550 mL  Total OUT: 1235 mL    Total NET: -155 mL      09-20 @ 07:01  -  09-20 @ 08:33  --------------------------------------------------------  IN:    Lactated Ringers: 40 mL  Total IN: 40 mL    OUT:  Total OUT: 0 mL    Total NET: 40 mL          PHYSICAL EXAM:   General: NAD, Lying in bed comfortably  Extrem: RLE wound vac in place holding suction.  HMV/MIS SS output.  Left shin VAc in place holding suction.    MEDICATIONS (STANDING): aspirin enteric coated 81 milliGRAM(s) Oral daily  cefepime   IVPB 2000 milliGRAM(s) IV Intermittent every 8 hours  DAPTOmycin IVPB 650 milliGRAM(s) IV Intermittent every 24 hours  dextrose 5%. 1000 milliLiter(s) IV Continuous <Continuous>  dextrose 50% Injectable 12.5 Gram(s) IV Push once  dextrose 50% Injectable 25 Gram(s) IV Push once  dextrose 50% Injectable 25 Gram(s) IV Push once  digoxin     Tablet 0.125 milliGRAM(s) Oral daily  gabapentin 100 milliGRAM(s) Oral three times a day  HYDROmorphone PCA (1 mG/mL) 30 milliLiter(s) PCA Continuous PCA Continuous  insulin lispro (HumaLOG) corrective regimen sliding scale   SubCutaneous three times a day before meals  lactated ringers. 1000 milliLiter(s) IV Continuous <Continuous>  metoprolol succinate ER 25 milliGRAM(s) Oral daily  prasugrel 10 milliGRAM(s) Oral daily  rifAMPin 300 milliGRAM(s) Oral every 12 hours  spironolactone 25 milliGRAM(s) Oral daily    MEDICATIONS (PRN):cyclobenzaprine 5 milliGRAM(s) Oral three times a day PRN Muscle Spasm  dextrose 40% Gel 15 Gram(s) Oral once PRN Blood Glucose LESS THAN 70 milliGRAM(s)/deciliter  glucagon  Injectable 1 milliGRAM(s) IntraMuscular once PRN Glucose LESS THAN 70 milligrams/deciliter  morphine  - Injectable 2 milliGRAM(s) IV Push every 4 hours PRN Breakthrough  morphine  IR 15 milliGRAM(s) Oral every 4 hours PRN Moderate Pain (4 - 6)  morphine  IR 30 milliGRAM(s) Oral every 4 hours PRN Severe Pain (7 - 10)  naloxone Injectable 0.1 milliGRAM(s) IV Push every 3 minutes PRN For ANY of the following changes in patient status:  A. RR LESS THAN 10 breaths per minute, B. Oxygen saturation LESS THAN 90%, C. Sedation score of 6  ondansetron Injectable 4 milliGRAM(s) IV Push every 6 hours PRN Nausea      LABS  CBC (09-20 @ 06:59)                              8.4<L>                         16.54<H>  )----------------(  234        --    % Neutrophils, --    % Lymphocytes, ANC: --                                  28.1<L>  CBC (09-19 @ 06:42)                              8.9<L>                         15.48<H>  )----------------(  243        --    % Neutrophils, --    % Lymphocytes, ANC: --                                  29.5<L>    BMP (09-20 @ 06:59)             132<L>  |  104     |  24<H> 		Ca++ --      Ca 8.0<L>             ---------------------------------( 200<H>		Mg 1.8                4.4     |  18<L>   |  1.00  			Ph 2.5     BMP (09-19 @ 06:42)             135     |  107     |  26<H> 		Ca++ --      Ca 7.9<L>             ---------------------------------( 119<H>		Mg 1.8                4.8     |  15<L>   |  1.00  			Ph 3.1       LFTs (09-18 @ 21:25)      TPro 5.2<L> / Alb 2.3<L> / TBili 1.0 / DBili -- / AST 21 / ALT 15 / AlkPhos 126<H>    Coags (09-19 @ 06:42)  aPTT 28.5 / INR 1.69<H> / PT 19.8<H>  Coags (09-18 @ 21:46)  aPTT 26.9<L> / INR 1.79<H> / PT 20.9<H>      ABG (09-18 @ 21:36)     7.34<L> / 32<L> / 76<L> / 17<L> / -7.9<L> / 94<L>%     Lactate:    ABG (09-18 @ 21:25)      /  /  /  /  / %     Lactate:  1.4        IMAGING STUDIES

## 2020-09-20 NOTE — PROGRESS NOTE ADULT - ASSESSMENT
65yMale s/p R knee antibiotic spacer 07/22 who presented with R knee swelling and RUQ wound drainage on 09/01/2020 and R hip exchange of hardware on 09/04/2020.  s/p right knee spacer revision and right medical gastroc flap and left leg STSG on 9/18/2020.

## 2020-09-21 LAB
ALBUMIN SERPL ELPH-MCNC: 2.3 G/DL — LOW (ref 3.3–5)
ALP SERPL-CCNC: 111 U/L — SIGNIFICANT CHANGE UP (ref 40–120)
ALT FLD-CCNC: 23 U/L — SIGNIFICANT CHANGE UP (ref 10–45)
ANION GAP SERPL CALC-SCNC: 9 MMOL/L — SIGNIFICANT CHANGE UP (ref 5–17)
AST SERPL-CCNC: 23 U/L — SIGNIFICANT CHANGE UP (ref 10–40)
BILIRUB SERPL-MCNC: 0.5 MG/DL — SIGNIFICANT CHANGE UP (ref 0.2–1.2)
BUN SERPL-MCNC: 23 MG/DL — SIGNIFICANT CHANGE UP (ref 7–23)
CALCIUM SERPL-MCNC: 7.8 MG/DL — LOW (ref 8.4–10.5)
CHLORIDE SERPL-SCNC: 102 MMOL/L — SIGNIFICANT CHANGE UP (ref 96–108)
CK SERPL-CCNC: 209 U/L — HIGH (ref 30–200)
CO2 SERPL-SCNC: 20 MMOL/L — LOW (ref 22–31)
CREAT SERPL-MCNC: 0.89 MG/DL — SIGNIFICANT CHANGE UP (ref 0.5–1.3)
GLUCOSE BLDC GLUCOMTR-MCNC: 125 MG/DL — HIGH (ref 70–99)
GLUCOSE BLDC GLUCOMTR-MCNC: 170 MG/DL — HIGH (ref 70–99)
GLUCOSE BLDC GLUCOMTR-MCNC: 269 MG/DL — HIGH (ref 70–99)
GLUCOSE BLDC GLUCOMTR-MCNC: 288 MG/DL — HIGH (ref 70–99)
GLUCOSE BLDC GLUCOMTR-MCNC: 352 MG/DL — HIGH (ref 70–99)
GLUCOSE SERPL-MCNC: 309 MG/DL — HIGH (ref 70–99)
GRAM STN FLD: SIGNIFICANT CHANGE UP
HCT VFR BLD CALC: 27.6 % — LOW (ref 39–50)
HGB BLD-MCNC: 8.2 G/DL — LOW (ref 13–17)
MAGNESIUM SERPL-MCNC: 1.7 MG/DL — SIGNIFICANT CHANGE UP (ref 1.6–2.6)
MCHC RBC-ENTMCNC: 22.3 PG — LOW (ref 27–34)
MCHC RBC-ENTMCNC: 29.7 GM/DL — LOW (ref 32–36)
MCV RBC AUTO: 75 FL — LOW (ref 80–100)
NRBC # BLD: 0 /100 WBCS — SIGNIFICANT CHANGE UP (ref 0–0)
PHOSPHATE SERPL-MCNC: 1.9 MG/DL — LOW (ref 2.5–4.5)
PLATELET # BLD AUTO: 228 K/UL — SIGNIFICANT CHANGE UP (ref 150–400)
POTASSIUM SERPL-MCNC: 4.1 MMOL/L — SIGNIFICANT CHANGE UP (ref 3.5–5.3)
POTASSIUM SERPL-SCNC: 4.1 MMOL/L — SIGNIFICANT CHANGE UP (ref 3.5–5.3)
PROT SERPL-MCNC: 5.6 G/DL — LOW (ref 6–8.3)
RBC # BLD: 3.68 M/UL — LOW (ref 4.2–5.8)
RBC # FLD: 22.3 % — HIGH (ref 10.3–14.5)
SODIUM SERPL-SCNC: 131 MMOL/L — LOW (ref 135–145)
SPECIMEN SOURCE: SIGNIFICANT CHANGE UP
WBC # BLD: 12.6 K/UL — HIGH (ref 3.8–10.5)
WBC # FLD AUTO: 12.6 K/UL — HIGH (ref 3.8–10.5)

## 2020-09-21 PROCEDURE — 99232 SBSQ HOSP IP/OBS MODERATE 35: CPT | Mod: GC

## 2020-09-21 PROCEDURE — 36569 INSJ PICC 5 YR+ W/O IMAGING: CPT

## 2020-09-21 PROCEDURE — 99232 SBSQ HOSP IP/OBS MODERATE 35: CPT

## 2020-09-21 RX ORDER — ROSUVASTATIN CALCIUM 5 MG/1
10 TABLET ORAL AT BEDTIME
Refills: 0 | Status: DISCONTINUED | OUTPATIENT
Start: 2020-09-21 | End: 2020-10-03

## 2020-09-21 RX ORDER — SERTRALINE 25 MG/1
25 TABLET, FILM COATED ORAL DAILY
Refills: 0 | Status: DISCONTINUED | OUTPATIENT
Start: 2020-09-21 | End: 2020-10-03

## 2020-09-21 RX ORDER — INSULIN GLARGINE 100 [IU]/ML
10 INJECTION, SOLUTION SUBCUTANEOUS AT BEDTIME
Refills: 0 | Status: DISCONTINUED | OUTPATIENT
Start: 2020-09-21 | End: 2020-09-21

## 2020-09-21 RX ORDER — GABAPENTIN 400 MG/1
200 CAPSULE ORAL AT BEDTIME
Refills: 0 | Status: DISCONTINUED | OUTPATIENT
Start: 2020-09-21 | End: 2020-09-22

## 2020-09-21 RX ORDER — ASPIRIN/CALCIUM CARB/MAGNESIUM 324 MG
81 TABLET ORAL
Refills: 0 | Status: DISCONTINUED | OUTPATIENT
Start: 2020-09-21 | End: 2020-10-03

## 2020-09-21 RX ORDER — CYCLOBENZAPRINE HYDROCHLORIDE 10 MG/1
10 TABLET, FILM COATED ORAL THREE TIMES A DAY
Refills: 0 | Status: DISCONTINUED | OUTPATIENT
Start: 2020-09-21 | End: 2020-10-03

## 2020-09-21 RX ORDER — POLYETHYLENE GLYCOL 3350 17 G/17G
17 POWDER, FOR SOLUTION ORAL DAILY
Refills: 0 | Status: DISCONTINUED | OUTPATIENT
Start: 2020-09-21 | End: 2020-10-03

## 2020-09-21 RX ORDER — SODIUM,POTASSIUM PHOSPHATES 278-250MG
2 POWDER IN PACKET (EA) ORAL EVERY 6 HOURS
Refills: 0 | Status: COMPLETED | OUTPATIENT
Start: 2020-09-21 | End: 2020-09-21

## 2020-09-21 RX ORDER — CHLORHEXIDINE GLUCONATE 213 G/1000ML
1 SOLUTION TOPICAL
Refills: 0 | Status: DISCONTINUED | OUTPATIENT
Start: 2020-09-21 | End: 2020-10-03

## 2020-09-21 RX ORDER — SENNA PLUS 8.6 MG/1
2 TABLET ORAL AT BEDTIME
Refills: 0 | Status: DISCONTINUED | OUTPATIENT
Start: 2020-09-21 | End: 2020-10-03

## 2020-09-21 RX ORDER — GABAPENTIN 400 MG/1
100 CAPSULE ORAL
Refills: 0 | Status: DISCONTINUED | OUTPATIENT
Start: 2020-09-21 | End: 2020-09-22

## 2020-09-21 RX ORDER — DIPHENHYDRAMINE HCL 50 MG
50 CAPSULE ORAL EVERY 4 HOURS
Refills: 0 | Status: DISCONTINUED | OUTPATIENT
Start: 2020-09-21 | End: 2020-10-03

## 2020-09-21 RX ORDER — INSULIN LISPRO 100/ML
8 VIAL (ML) SUBCUTANEOUS
Refills: 0 | Status: DISCONTINUED | OUTPATIENT
Start: 2020-09-21 | End: 2020-09-29

## 2020-09-21 RX ORDER — NYSTATIN CREAM 100000 [USP'U]/G
1 CREAM TOPICAL
Refills: 0 | Status: DISCONTINUED | OUTPATIENT
Start: 2020-09-21 | End: 2020-10-03

## 2020-09-21 RX ORDER — INSULIN GLARGINE 100 [IU]/ML
20 INJECTION, SOLUTION SUBCUTANEOUS AT BEDTIME
Refills: 0 | Status: DISCONTINUED | OUTPATIENT
Start: 2020-09-21 | End: 2020-10-03

## 2020-09-21 RX ORDER — SODIUM CHLORIDE 9 MG/ML
10 INJECTION INTRAMUSCULAR; INTRAVENOUS; SUBCUTANEOUS
Refills: 0 | Status: DISCONTINUED | OUTPATIENT
Start: 2020-09-21 | End: 2020-10-03

## 2020-09-21 RX ADMIN — Medication 8 UNIT(S): at 11:30

## 2020-09-21 RX ADMIN — Medication 81 MILLIGRAM(S): at 11:30

## 2020-09-21 RX ADMIN — ROSUVASTATIN CALCIUM 10 MILLIGRAM(S): 5 TABLET ORAL at 21:31

## 2020-09-21 RX ADMIN — SERTRALINE 25 MILLIGRAM(S): 25 TABLET, FILM COATED ORAL at 13:32

## 2020-09-21 RX ADMIN — TAMSULOSIN HYDROCHLORIDE 0.4 MILLIGRAM(S): 0.4 CAPSULE ORAL at 21:31

## 2020-09-21 RX ADMIN — PRASUGREL 10 MILLIGRAM(S): 5 TABLET, FILM COATED ORAL at 11:30

## 2020-09-21 RX ADMIN — Medication 50 MILLIGRAM(S): at 00:06

## 2020-09-21 RX ADMIN — Medication 2 PACKET(S): at 20:38

## 2020-09-21 RX ADMIN — Medication 6: at 11:53

## 2020-09-21 RX ADMIN — Medication 2 PACKET(S): at 11:28

## 2020-09-21 RX ADMIN — Medication 6: at 08:04

## 2020-09-21 RX ADMIN — Medication 2: at 17:57

## 2020-09-21 RX ADMIN — CYCLOBENZAPRINE HYDROCHLORIDE 10 MILLIGRAM(S): 10 TABLET, FILM COATED ORAL at 13:40

## 2020-09-21 RX ADMIN — MORPHINE SULFATE 2 MILLIGRAM(S): 50 CAPSULE, EXTENDED RELEASE ORAL at 09:23

## 2020-09-21 RX ADMIN — CEFEPIME 100 MILLIGRAM(S): 1 INJECTION, POWDER, FOR SOLUTION INTRAMUSCULAR; INTRAVENOUS at 17:59

## 2020-09-21 RX ADMIN — DAPTOMYCIN 126 MILLIGRAM(S): 500 INJECTION, POWDER, LYOPHILIZED, FOR SOLUTION INTRAVENOUS at 00:33

## 2020-09-21 RX ADMIN — INSULIN GLARGINE 20 UNIT(S): 100 INJECTION, SOLUTION SUBCUTANEOUS at 21:31

## 2020-09-21 RX ADMIN — MORPHINE SULFATE 2 MILLIGRAM(S): 50 CAPSULE, EXTENDED RELEASE ORAL at 10:00

## 2020-09-21 RX ADMIN — CEFEPIME 100 MILLIGRAM(S): 1 INJECTION, POWDER, FOR SOLUTION INTRAMUSCULAR; INTRAVENOUS at 11:29

## 2020-09-21 RX ADMIN — POLYETHYLENE GLYCOL 3350 17 GRAM(S): 17 POWDER, FOR SOLUTION ORAL at 13:32

## 2020-09-21 RX ADMIN — Medication 25 MILLIGRAM(S): at 05:38

## 2020-09-21 RX ADMIN — GABAPENTIN 200 MILLIGRAM(S): 400 CAPSULE ORAL at 21:31

## 2020-09-21 RX ADMIN — GABAPENTIN 100 MILLIGRAM(S): 400 CAPSULE ORAL at 13:32

## 2020-09-21 RX ADMIN — MORPHINE SULFATE 30 MILLIGRAM(S): 50 CAPSULE, EXTENDED RELEASE ORAL at 11:30

## 2020-09-21 RX ADMIN — MORPHINE SULFATE 2 MILLIGRAM(S): 50 CAPSULE, EXTENDED RELEASE ORAL at 14:28

## 2020-09-21 RX ADMIN — Medication 8 UNIT(S): at 17:57

## 2020-09-21 RX ADMIN — GABAPENTIN 100 MILLIGRAM(S): 400 CAPSULE ORAL at 05:38

## 2020-09-21 RX ADMIN — Medication 0.12 MILLIGRAM(S): at 05:38

## 2020-09-21 RX ADMIN — SPIRONOLACTONE 25 MILLIGRAM(S): 25 TABLET, FILM COATED ORAL at 05:38

## 2020-09-21 RX ADMIN — NYSTATIN CREAM 1 APPLICATION(S): 100000 CREAM TOPICAL at 20:41

## 2020-09-21 RX ADMIN — Medication 81 MILLIGRAM(S): at 17:58

## 2020-09-21 RX ADMIN — MORPHINE SULFATE 30 MILLIGRAM(S): 50 CAPSULE, EXTENDED RELEASE ORAL at 18:13

## 2020-09-21 RX ADMIN — CEFEPIME 100 MILLIGRAM(S): 1 INJECTION, POWDER, FOR SOLUTION INTRAMUSCULAR; INTRAVENOUS at 00:00

## 2020-09-21 NOTE — PROGRESS NOTE ADULT - SUBJECTIVE AND OBJECTIVE BOX
INTERVAL HPI/OVERNIGHT EVENTS:  Seen at bedside. Endorsed increase in pus drainage from RUQ      CONSTITUTIONAL:  No fever, chills, night sweats  EYES:  No photophobia or visual changes  CARDIOVASCULAR:  No chest pain  RESPIRATORY:  No cough, wheezing, or SOB   GASTROINTESTINAL:  No nausea, vomiting, diarrhea, constipation, or abdominal pain  GENITOURINARY:  No frequency, urgency, dysuria or hematuria  NEUROLOGIC:  No headache, lightheadedness      ANTIBIOTICS/RELEVANT:  Daptomycin 675 mg IV q24h (9/6-present)  Cefepime 2 g IV q8h (9/6-present)  Rifampin 300 mg po q12h          Vital Signs Last 24 Hrs  T(C): 37.4 (21 Sep 2020 13:21), Max: 37.4 (21 Sep 2020 13:21)  T(F): 99.3 (21 Sep 2020 13:21), Max: 99.3 (21 Sep 2020 13:21)  HR: 87 (21 Sep 2020 13:21) (86 - 95)  BP: 102/51 (21 Sep 2020 13:21) (96/51 - 110/52)  BP(mean): --  RR: 15 (21 Sep 2020 13:21) (15 - 19)  SpO2: 99% (21 Sep 2020 13:21) (94% - 99%)    PHYSICAL EXAM:  Constitutional:  Alert, nontoxic appearing  Eyes:  Sclerae anicteric, conjunctivae clear, PERRL  Ear/Nose/Throat:  No nasal exudate or sinus tenderness;  No buccal mucosal lesions, no pharyngeal erythema or exudate	  Neck:  Supple, no adenopathy  Respiratory:  Clear bilaterally  Cardiovascular:  RRR, S1S2, no murmur appreciated  Gastrointestinal:  RUQ ~1 cm incision with no surrounding erythema/edema but with purulent drainage  Normoactive BS, soft, NT, no masses, guarding or rebound.  No HSM.  No CVAT        LABS:                        8.2    12.60 )-----------( 228      ( 21 Sep 2020 06:21 )             27.6         09-21    131<L>  |  102  |  23  ----------------------------<  309<H>  4.1   |  20<L>  |  0.89    Ca    7.8<L>      21 Sep 2020 06:21  Phos  1.9     09-21  Mg     1.7     09-21    TPro  5.6<L>  /  Alb  2.3<L>  /  TBili  0.5  /  DBili  x   /  AST  23  /  ALT  23  /  AlkPhos  111  09-21          MICROBIOLOGY:  Blood cultures:  9/1 X 2 - NG  Abd wall wound 9/3 - VRE. faecium and Pseudomonas aeruginosa    OR cultures R hip 9/4:  NGTD    Cultures from abd wall I&D 9/4 - VRE. faecium and Pseudomonas aeruginosa        RADIOLOGY & ADDITIONAL STUDIES:

## 2020-09-21 NOTE — PROGRESS NOTE ADULT - ATTENDING COMMENTS
Patient seen and examined with house-staff during bedside rounds.  Resident note read, including vitals, physical findings, laboratory data, and radiological reports.   Revisions included below.  Direct personal management at bed side and extensive interpretation of the data.  Plan was outlined and discussed in details with the housestaff.  Decision making of high complexity  Action taken for acute disease activity to reflect the level of care provided:  - medication reconciliation  - review laboratory datap    monitor sodiium and fluid restriction DC LR

## 2020-09-21 NOTE — PROGRESS NOTE ADULT - SUBJECTIVE AND OBJECTIVE BOX
Ortho Note    Pt apologetic this morning for accidentally pulling out PICC line. Denies any numbness/tingling. Complains of some soreness over R knee but otherwise no acute distress  Denies CP, SOB, N/V    Vital Signs Last 24 Hrs  T(C): 37.2 (21 Sep 2020 05:32), Max: 37.2 (20 Sep 2020 06:15)  T(F): 98.9 (21 Sep 2020 05:32), Max: 98.9 (20 Sep 2020 06:15)  HR: 89 (21 Sep 2020 05:32) (89 - 103)  BP: 110/52 (21 Sep 2020 05:32) (96/51 - 114/53)  BP(mean): --  RR: 18 (21 Sep 2020 05:32) (16 - 19)  SpO2: 96% (21 Sep 2020 05:32) (94% - 99%)    RLE:  DSG  R knee Wound vac holding suction at 125, MIS x1, HV x1, in KI, proximal donor site covered in tegaderm  Pulses: +DP;  feet cool to touch; at baseline; no edema  Sensation: Sensation intact to baseline (diminished sensation- h/o severe neuropathy; follows with vascular Dr. Malloy outpatient)  Motor: 5/5 EHL/FHL    LLE- Wound vac holding suction at 125  Pulses: feet cool to touch; at baseline; no edema  Sensation: SILT to baseline (diminished sensation- h/o severe neuropathy; follows with vascular Dr. Malloy outpatient)  Motor: 5/5 EHL/FHL/TA/GS               A/P: 65yMale s/p R Knee Revision Antibiotic Spacer by Dr. Castro, R Knee medial gastroc flap and LLE STSG by Dr. Mckeon on 9/18  - Stepped down from SICU on 9/19 to 9WO  - s/p 3u PRBC intraop due to intraop blood loss, postop hgb 9.0, will continue to monitor;   - Pt accidentally pulled R arm PICC line 9/21 AM, currently has 22G IV but will need replaced at some point before discharge as pt will need to be on long term antibiotics  - Pain Control, continue pain mgmt recs  - DVT ppx: ASA + effient (home med) restarted 9/19  - WBS: TTWB RLE in KI at all times, Strict bedrest x7 days as per Dr. Mckeon for RLE flap healing  - Can have PT evaluate but in bed exercises only  - continue bowel regimen  - appreciate ID recs- Continue dapto, cefepime, rifampin; check CPK 9/21 with dapto; f/o CBC with diff and CMP (elevated liver enzymes in past with rifampin)  - appreciate internal medicine, and cardiology recs, resumed all pre-op meds  - appreciate plastics recs- wound vacs staying on x7 days until 9/25  - appreciate gen surg recs- will need cholecystectomy (elective) in near future, currently no abd pain, n/v; dressing changes as per nursing; no acute drainage from RUQ site  - appreciate psych recs- currently feels well on regimen today, will reconsult as needed  - podiatry following for R foot diabetic ulcers x2- debrided at bedside; bactroban applied and dsg changes as per podiatry  - dispo: pending  - ADAT      Ortho Pager [phone]

## 2020-09-21 NOTE — PROGRESS NOTE ADULT - PROBLEM SELECTOR PLAN 1
on broad spectrum antibiotics,   f/u intraop c/s  f/u vanco level and vanco was adjusted  I will discuss with ortho regarding the recent cultures from the right hip and is negative but the abdominal wound cultures are positive fro VREF and Pseudomonas.  He is on vanco and Rifampin  culture positive for MRSA with negative blood culture repeat but 4 bottles were positive on admission  JOHN PAUL unlikely endocarditis but might need to be treated as such.  The AICD bed is stable with no signs of infection  On Vanco and Rifampin, PICC line Monday.   hardware removed and follow on cultures which is negative to date  he is stable and he has area of necrosis over the wound and is followed by plastics  sepsis resolved and he is on antibiotic  I discussed with surgery and ID.  he is daptomycin and will follow with gallium scan  results of the hida reviewed and discussed with ID .  I discussed gallium scan with surgery and no evidence of cholecystitis.  not for surgery.

## 2020-09-21 NOTE — PROGRESS NOTE ADULT - SUBJECTIVE AND OBJECTIVE BOX
Ortho Note    Pt seen and examined. Reports "itchiness", and pain not well-controlled on PCA, which was ordered post-op. Reports spasms as contributing to the most amount of pain. Patient accidentally removed PICC in sleep last night. Site without bleeding or swelling.   Denies CP, SOB, N/V, numbness/tingling, abd pain, headaches, trouble urinating, constipation.      Vital Signs Last 24 Hrs  T(C): 37.2 (09-21-20 @ 05:32), Max: 37.2 (09-21-20 @ 05:32)  T(F): 98.9 (09-21-20 @ 05:32), Max: 98.9 (09-21-20 @ 05:32)  HR: 89 (09-21-20 @ 05:32) (89 - 89)  BP: 110/52 (09-21-20 @ 05:32) (110/52 - 110/52)  BP(mean): --  RR: 18 (09-21-20 @ 05:32) (18 - 18)  SpO2: 96% (09-21-20 @ 05:32) (96% - 96%)  AVSS    RLE:  DSG: R knee Wound vac holding suction at 125, IMS x1, HV x1, in KI, proximal donor site covered in tegaderm  Pulses: +DP;  feet cool to touch; at baseline; no edema  Sensation: Sensation intact to baseline (diminished sensation- h/o severe neuropathy; follows with vascular Dr. Malloy outpatient)  Motor: 5/5 EHL/FHL; limiting dorsi and plantar flexion on R as per plastics    LLE:  DSG: Wound vac holding suction at 125  Pulses: feet cool to touch; at baseline; no edema  Sensation: SILT to baseline (diminished sensation- h/o severe neuropathy; follows with vascular Dr. Malloy outpatient)  Motor: 5/5 EHL/FHL/TA/GS                        8.2    12.60 )-----------( 228      ( 21 Sep 2020 06:21 )             27.6   21 Sep 2020 06:21    131    |  102    |  23     ----------------------------<  309    4.1     |  20     |  0.89     Ca    7.8        21 Sep 2020 06:21  Phos  1.9       21 Sep 2020 06:21  Mg     1.7       21 Sep 2020 06:21    TPro  5.6    /  Alb  2.3    /  TBili  0.5    /  DBili  x      /  AST  23     /  ALT  23     /  AlkPhos  111    21 Sep 2020 06:21    A/P: 65yMale admitted for R knee pain, drainage s/p right knee arthroscopic I+D 7/17, right knee explant and abx spacer 7/22 with Dr. Castro;  and debridement of LLE wound with wound vac placement on 7/30 by Dr. Bowen. Was original d/c'd home on 8/10/20 with PICC and vancomycin IV q12h + rifampin.  Readmitted, and now s/p R hip exchange of hardware on 09/04/2020; I+D of RUQ on 09/04/2020; R Knee Revision Antibiotic Spacer by Dr. Castro, R Knee medial gastroc flap and LLE STSG by Dr. Mckeon on 9/18  - afebrile since 9/17/20 when workup for sepsis was negative (BC NGTD, elevated WBC resolved; surgical wounds did not appear infected; UA negative)  - diabetes mellitus II with hyperglycemia- Patient has been on sliding scale post-op through the weekend with elevated sugars (250-350). Was well-controlled pre-op. Restarting pre-op regimen of 8units pre-meal insulin and 10units lantus at bedtime today;  - protein calorie malnutrition/ uncontrolled diabetes- Nutrition services consulted, appreciate recs  - Hypophosphatemia- Ph 1.8- supplemented orally; Hyponatremia- Na 131, asymptomatic- continue to monitor, appreciate internal medicine recs  - H+H 8.2/27.6- S/p 3units PRBCs in OR on Friday 9/19 (H+H day of surgery 9.2/33.0), and close ICU monitoring. Currently asymptomatic and at baseline of Hgb levels from prior admissions. Continue to monitor labs; transfuse for Hgb < 8.0.   - Pain Control- Patient was on PCA dilaudid throughout the weekend. Causing "itchiness" and not helping pain. PCA d/c'd. PO morphine 30mg q4h PRN encouraged; flexeril increased to 10mg from 5mg tid PRN  - DVT ppx: ASA bid+ effient (home med)  - PT, WBS: TTWB RLE; WBAT LLE; Strict bed rest and PT to be held until Friday 9/25 as per Plastics. Bed exercises OK. No dorsiflexion or plantar flexion of RLE  - aggressive I/S, bowel regimen (reports regular BMs since surgery 9/18)  - appreciate ID recs- Continue dapto, cefepime, rifampin; f/u CPK with dapto (elevated at 209 today from 60 on 9/14); f/o CBC with diff and CMP (elevated liver enzymes in past with rifampin); new PICC line to be inserted today (9/20)  - appreciate internal medicine, and cardiology recs   - appreciate plastics recs- Wound vacs on LLE, and R knee to be continued until Friday (possible wound vac changes Friday 9/25); continue HV and MIS x 1 for now and monitor output.   - appreciate gen surg recs- will need cholecystectomy (elective) in near future, currently no abd pain, n/v; dressing changes as per nursing; no acute drainage from RUQ site  - appreciate psych recs- currently feels well on regimen today, will reconsult as needed  - R diabetic foot ulcers x 2- debrided 9/16 by podiatry- dsg changes as per podiatry, appreciate podiatry recs  - dispo: pending medical stabilization/ OOB status Friday; likely [facility] vs. home with home PT and home infusion services    Ortho Pager [phone] Ortho Note    Pt seen and examined. Reports "itchiness", and pain not well-controlled on PCA, which was ordered post-op. Reports spasms as contributing to the most amount of pain. Patient accidentally removed PICC in sleep last night. Site without bleeding or swelling.   Denies CP, SOB, N/V, numbness/tingling, abd pain, headaches, trouble urinating, constipation.      Vital Signs Last 24 Hrs  T(C): 37.2 (09-21-20 @ 05:32), Max: 37.2 (09-21-20 @ 05:32)  T(F): 98.9 (09-21-20 @ 05:32), Max: 98.9 (09-21-20 @ 05:32)  HR: 89 (09-21-20 @ 05:32) (89 - 89)  BP: 110/52 (09-21-20 @ 05:32) (110/52 - 110/52)  BP(mean): --  RR: 18 (09-21-20 @ 05:32) (18 - 18)  SpO2: 96% (09-21-20 @ 05:32) (96% - 96%)  AVSS    RLE:  DSG: R knee Wound vac holding suction at 125, MIS x1, HV x1, in KI, proximal donor site covered in tegaderm  Pulses: +DP;  feet cool to touch; at baseline; no edema  Sensation: Sensation intact to baseline (diminished sensation- h/o severe neuropathy; follows with vascular Dr. Malloy outpatient)  Motor: 5/5 EHL/FHL; limiting dorsi and plantar flexion on R as per plastics    LLE:  DSG: Wound vac holding suction at 125  Pulses: feet cool to touch; at baseline; no edema  Sensation: SILT to baseline (diminished sensation- h/o severe neuropathy; follows with vascular Dr. Malloy outpatient)  Motor: 5/5 EHL/FHL/TA/GS                        8.2    12.60 )-----------( 228      ( 21 Sep 2020 06:21 )             27.6   21 Sep 2020 06:21    131    |  102    |  23     ----------------------------<  309    4.1     |  20     |  0.89     Ca    7.8        21 Sep 2020 06:21  Phos  1.9       21 Sep 2020 06:21  Mg     1.7       21 Sep 2020 06:21    TPro  5.6    /  Alb  2.3    /  TBili  0.5    /  DBili  x      /  AST  23     /  ALT  23     /  AlkPhos  111    21 Sep 2020 06:21    A/P: 65yMale admitted for R knee pain, drainage s/p right knee arthroscopic I+D 7/17, right knee explant and abx spacer 7/22 with Dr. Castro;  and debridement of LLE wound with wound vac placement on 7/30 by Dr. Bowen. Was original d/c'd home on 8/10/20 with PICC and vancomycin IV q12h + rifampin.  Readmitted, and now s/p R hip exchange of hardware on 09/04/2020; I+D of RUQ on 09/04/2020; R Knee Revision Antibiotic Spacer by Dr. Castro, R Knee medial gastroc flap and LLE STSG by Dr. Mckeon on 9/18  - afebrile since 9/17/20 when workup for sepsis was negative (BC NGTD, elevated WBC resolved; surgical wounds did not appear infected; UA negative)  - diabetes mellitus II with hyperglycemia- Patient has been on sliding scale post-op through the weekend with elevated sugars (250-350). Was well-controlled pre-op. Restarting pre-op regimen of 8units pre-meal insulin and 10units lantus at bedtime today;  - protein calorie malnutrition/ uncontrolled diabetes- Nutrition services consulted, appreciate recs  - Hypophosphatemia- Ph 1.8- supplemented orally; Hyponatremia- Na 131, asymptomatic- continue to monitor, appreciate internal medicine recs  - H+H 8.2/27.6- S/p 3units PRBCs in OR on Friday 9/19 (H+H day of surgery 9.2/33.0), and close ICU monitoring. Currently asymptomatic and at baseline of Hgb levels from prior admissions. Continue to monitor labs; transfuse for Hgb < 8.0.   - Pain Control- Patient was on PCA dilaudid throughout the weekend. Causing "itchiness" and not helping pain. PCA d/c'd. PO morphine 30mg q4h PRN encouraged; flexeril increased to 10mg from 5mg tid PRN  - DVT ppx: ASA bid+ effient (home med)  - PT, WBS: TTWB RLE; WBAT LLE; Strict bed rest and PT to be held until Friday 9/25 as per Plastics. Bed exercises OK. No dorsiflexion or plantar flexion of RLE  - aggressive I/S, bowel regimen (reports regular BMs since surgery 9/18)  - appreciate ID recs- Continue dapto, cefepime, rifampin; f/u CPK with dapto (elevated at 209 today from 60 on 9/14); f/o CBC with diff and CMP (elevated liver enzymes in past with rifampin); new PICC line to be inserted today (9/20)  - appreciate internal medicine, and cardiology recs   - appreciate plastics recs- Wound vacs on LLE, and R knee to be continued until Friday (possible wound vac changes Friday 9/25); continue HV and MIS x 1 for now and monitor output.   - appreciate gen surg recs- will need cholecystectomy (elective) in near future, currently no abd pain, n/v; yellow drainage coming out from RUQ site today; cultures sent to lab; f/u results and appreciate ID recs  - appreciate psych recs- currently feels well on regimen today, will reconsult as needed  - R diabetic foot ulcers x 2- debrided 9/16 by podiatry- dsg changes as per podiatry, appreciate podiatry recs  - dispo: pending medical stabilization/ OOB status Friday; likely [facility] vs. home with home PT and home infusion services    Ortho Pager [phone]

## 2020-09-21 NOTE — PROGRESS NOTE ADULT - PROBLEM SELECTOR PLAN 2
9 day s/p arthroscopic I and D, 7/17/2020.  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 right knee explant and antibiotic spacer.  Will follow with ID regarding the right hip cultures which is negative to date and does not look infected  repeat cultures from the last surgery negative to date.  PICC line.

## 2020-09-21 NOTE — PROGRESS NOTE ADULT - SUBJECTIVE AND OBJECTIVE BOX
Interval Events: Reviewed  Patient seen and examined at bedside.    Patient is a 65y old  Male who presents with a chief complaint of R Knee Swelling (21 Sep 2020 14:53)  he is doing Ok abd pain is better controlled    PAST MEDICAL & SURGICAL HISTORY:  Diabetic neuropathy    STEMI (ST elevation myocardial infarction)    Diverticulitis    MRSA bacteremia    History of celiac disease    CHF (congestive heart failure)  EF ~ 25%    HTN (hypertension)    Diabetes  on insulin pump    Blood clot due to device, implant, or graft  was on blood thinners    HLD (hyperlipidemia)    Osteoarthritis    Atherosclerosis of coronary artery  CAD (coronary artery disease)    Status post percutaneous transluminal coronary angioplasty  in 2012    History of open reduction and internal fixation (ORIF) procedure    Surgery, elective  Right shoulder    Surgery, elective  right knee wound debridement    S/P TKR (total knee replacement), right  with infection Mrsa   per pt he was cleared from MRSA infection    S/P CABG x 1  2018    Stented coronary artery  10/18 heart attack  INFERIOR WALL MI    Other postprocedural status  Fixation hardware in foot LEFT        MEDICATIONS:  Pulmonary:    Antimicrobials:  cefepime   IVPB 2000 milliGRAM(s) IV Intermittent every 8 hours  DAPTOmycin IVPB 650 milliGRAM(s) IV Intermittent every 24 hours  rifAMPin 300 milliGRAM(s) Oral every 12 hours    Anticoagulants:  aspirin enteric coated 81 milliGRAM(s) Oral two times a day  prasugrel 10 milliGRAM(s) Oral daily    Cardiac:  digoxin     Tablet 0.125 milliGRAM(s) Oral daily  metoprolol succinate ER 25 milliGRAM(s) Oral daily  spironolactone 25 milliGRAM(s) Oral daily  tamsulosin 0.4 milliGRAM(s) Oral at bedtime      Allergies    ACE inhibitors (Hives)  carvedilol (Other)  enalapril (Hives)  Entresto (Other)    Intolerances        Vital Signs Last 24 Hrs  T(C): 37.4 (21 Sep 2020 13:21), Max: 37.4 (21 Sep 2020 13:21)  T(F): 99.3 (21 Sep 2020 13:21), Max: 99.3 (21 Sep 2020 13:21)  HR: 87 (21 Sep 2020 13:21) (86 - 95)  BP: 102/51 (21 Sep 2020 13:21) (96/51 - 110/52)  BP(mean): --  RR: 15 (21 Sep 2020 13:21) (15 - 19)  SpO2: 99% (21 Sep 2020 13:21) (94% - 99%)    09-20 @ 07:01 - 09-21 @ 07:00  --------------------------------------------------------  IN: 960 mL / OUT: 1910 mL / NET: -950 mL    09-21 @ 07:01 - 09-21 @ 17:00  --------------------------------------------------------  IN: 0 mL / OUT: 780 mL / NET: -780 mL          Review of Systems:   •	General: negative  •	Skin/Breast: negative  •	Ophthalmologic: negative  •	ENMT: negative  •	Respiratory and Thorax: negative  •	Cardiovascular: negative  •	Gastrointestinal: negative  •	Genitourinary: negative  •	Musculoskeletal: negative  •	Neurological: negative  •	Psychiatric: negative  •	Hematology/Lymphatics: negative  •	Endocrine: negative  •	Allergic/Immunologic: negative    Physical Exam:   • Constitutional:	Well-developed, well nourished  • Eyes:	EOMI; PERRL; no drainage or redness  • ENMT:	No oral lesions; no gross abnormalities  • Neck	No bruits; no thyromegaly or nodules  • Breasts:	not examined  • Back:	No deformity or limitation of movement  • Respiratory:	Breath Sounds equal & clear to percussion & auscultation, no accessory muscle use  • Cardiovascular:	Regular rate & rhythm, normal S1, S2; no murmurs, gallops or rubs; no S3, S4  • Gastrointestinal:	Soft, non-tender, no hepatosplenomegaly, normal bowel sounds  • Genitourinary:	not examined  • Rectal: not examined  • Extremities:	No cyanosis, clubbing or edema  • Vascular:	Equal and normal pulses (carotid, femoral, dorsalis pedis)  • Neurologica:l	not examined  • Skin:	No lesions; no rash  • Lymph Nodes:	No lymphadedenopathy  • Musculoskeletal:	No joint pain, swelling or deformity; no limitation of movement        LABS:      CBC Full  -  ( 21 Sep 2020 06:21 )  WBC Count : 12.60 K/uL  RBC Count : 3.68 M/uL  Hemoglobin : 8.2 g/dL  Hematocrit : 27.6 %  Platelet Count - Automated : 228 K/uL  Mean Cell Volume : 75.0 fl  Mean Cell Hemoglobin : 22.3 pg  Mean Cell Hemoglobin Concentration : 29.7 gm/dL  Auto Neutrophil # : x  Auto Lymphocyte # : x  Auto Monocyte # : x  Auto Eosinophil # : x  Auto Basophil # : x  Auto Neutrophil % : x  Auto Lymphocyte % : x  Auto Monocyte % : x  Auto Eosinophil % : x  Auto Basophil % : x    09-21    131<L>  |  102  |  23  ----------------------------<  309<H>  4.1   |  20<L>  |  0.89    Ca    7.8<L>      21 Sep 2020 06:21  Phos  1.9     09-21  Mg     1.7     09-21    TPro  5.6<L>  /  Alb  2.3<L>  /  TBili  0.5  /  DBili  x   /  AST  23  /  ALT  23  /  AlkPhos  111  09-21                        RADIOLOGY & ADDITIONAL STUDIES (The following images were personally reviewed):  Loja:                                     No  Urine output:                       adequate  DVT prophylaxis:                 Yes  Flattus:                                  Yes  Bowel movement:              No

## 2020-09-21 NOTE — PROGRESS NOTE ADULT - SUBJECTIVE AND OBJECTIVE BOX
Patient is a 65y old  Male who presents with a chief complaint of R Knee Swelling (21 Sep 2020 09:15)      INTERVAL HPI/ OVERNIGHT EVENTS: Pt seen at bedside. He was in a lot of pain/ distress and was very emotional because he was receiving wound care during the encounter. He requested we do dressing changes of his foot tomorrow, because he was in too much pain.       LABS                        8.2    12.60 )-----------( 228      ( 21 Sep 2020 06:21 )             27.6     09-21    131<L>  |  102  |  23  ----------------------------<  309<H>  4.1   |  20<L>  |  0.89    Ca    7.8<L>      21 Sep 2020 06:21  Phos  1.9     09-21  Mg     1.7     09-21    TPro  5.6<L>  /  Alb  2.3<L>  /  TBili  0.5  /  DBili  x   /  AST  23  /  ALT  23  /  AlkPhos  111  09-21        ICU Vital Signs Last 24 Hrs  T(C): 36.7 (21 Sep 2020 09:41), Max: 37.2 (21 Sep 2020 05:32)  T(F): 98.1 (21 Sep 2020 09:41), Max: 98.9 (21 Sep 2020 05:32)  HR: 86 (21 Sep 2020 09:41) (86 - 103)  BP: 109/61 (21 Sep 2020 09:41) (96/51 - 110/52)  BP(mean): --  ABP: --  ABP(mean): --  RR: 18 (21 Sep 2020 09:41) (18 - 19)  SpO2: 96% (21 Sep 2020 09:41) (94% - 99%)      RADIOLOGY  < from: Xray Foot AP + Lateral, Bilateral (09.16.20 @ 14:16) >  Examination of the bilateral feet demonstrates severe arthrosis in the right first PIP joint, with valgus deformity. Calcaneal spurs no fractures. Internal fixation in the left first PIP joint. Soft tissue swelling in the plantar aspect of the left midfoot, without associated adjacent osseous abnormality. No fractures.    < end of copied text >      MICROBIOLOGY    PHYSICAL EXAM  Lower Extremity Focused  Unable to perform because patient was in too much pain/ distress and requested us to come back tomorrow.

## 2020-09-21 NOTE — PROGRESS NOTE ADULT - ASSESSMENT
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  He has completed 6 weeks of vancomycin & rifampin.  He is s/p R hip exchange of hardware on 9/4 - OR cultures NGTD.  I&D of abd wall done 9/4 - cultures growing Pseudomonas and Enterococcus faecium. Gallium SPECT done 9/10 showed minimal uptake from GB fossa to soft tissue and was interpreted by surgery as a negative study. S/p R knee spacer exchange, muscle flap and L shin ulcer debridement on 9/18.  Now again with worsening purulent drainage from RUQ.    Suggest:  - Please send cultures from RUQ drainage  - send ESR, CRP  - Continue daptomycin  675 mg IV q24h  - Continue cefepime 2 g IV q8h  - Continue rifampin 300 mg po q12h.    ID team 1 will follow

## 2020-09-21 NOTE — PROGRESS NOTE ADULT - SUBJECTIVE AND OBJECTIVE BOX
INTERVAL HISTORY:  Depressed and crying. No dyspnea or dizziness.	  Chart, [facility] medications and allergies reviewed    MEDICATIONS:  MEDICATIONS  (STANDING):  aspirin enteric coated 81 milliGRAM(s) Oral two times a day  cefepime   IVPB 2000 milliGRAM(s) IV Intermittent every 8 hours  chlorhexidine 2% Cloths 1 Application(s) Topical <User Schedule>  DAPTOmycin IVPB 650 milliGRAM(s) IV Intermittent every 24 hours  dextrose 5%. 1000 milliLiter(s) (50 mL/Hr) IV Continuous <Continuous>  dextrose 50% Injectable 12.5 Gram(s) IV Push once  dextrose 50% Injectable 25 Gram(s) IV Push once  dextrose 50% Injectable 25 Gram(s) IV Push once  digoxin     Tablet 0.125 milliGRAM(s) Oral daily  gabapentin 100 milliGRAM(s) Oral <User Schedule>  gabapentin 200 milliGRAM(s) Oral at bedtime  insulin glargine Injectable (LANTUS) 10 Unit(s) SubCutaneous at bedtime  insulin lispro (HumaLOG) corrective regimen sliding scale   SubCutaneous Before meals and at bedtime  insulin lispro Injectable (HumaLOG) 8 Unit(s) SubCutaneous three times a day before meals  metoprolol succinate ER 25 milliGRAM(s) Oral daily  nystatin Cream 1 Application(s) Topical two times a day  polyethylene glycol 3350 17 Gram(s) Oral daily  potassium phosphate / sodium phosphate Powder (PHOS-NaK) 2 Packet(s) Oral every 6 hours  prasugrel 10 milliGRAM(s) Oral daily  rifAMPin 300 milliGRAM(s) Oral every 12 hours  rosuvastatin 10 milliGRAM(s) Oral at bedtime  sertraline 25 milliGRAM(s) Oral daily  spironolactone 25 milliGRAM(s) Oral daily  tamsulosin 0.4 milliGRAM(s) Oral at bedtime      REVIEW OF SYSTEMS:  CONSTITUTIONAL: No fever, no weight loss, no fatigue  PULMONARY: No cough, no wheezing, chills no hemoptysis; No Shortness of Breath  CARDIOVASCULAR: No chest pain, no palpitations, no syncope, dizziness, no leg swelling  GASTROINTESTINAL: No abdominal pain. No nausea, no  vomiting, no hematemesis; No diarrhea, no constipation. No melena, no hematochezia.  GENITOURINARY: No dysuria, no frequency, no hematuria, no incontinence  SKIN: No itching, no burning, no rashes, no lesions     PHYSICAL EXAM:  T(C): 37.4 (09-21-20 @ 13:21), Max: 37.4 (09-21-20 @ 13:21)  HR: 87 (09-21-20 @ 13:21) (86 - 95)  BP: 102/51 (09-21-20 @ 13:21) (96/51 - 110/52)  RR: 15 (09-21-20 @ 13:21) (15 - 19)  SpO2: 99% (09-21-20 @ 13:21) (94% - 99%)  Wt(kg): --  I&O's Summary    20 Sep 2020 07:01  -  21 Sep 2020 07:00  --------------------------------------------------------  IN: 960 mL / OUT: 1910 mL / NET: -950 mL    21 Sep 2020 07:01  -  21 Sep 2020 14:53  --------------------------------------------------------  IN: 0 mL / OUT: 400 mL / NET: -400 mL        Appearance:  No deformities, normal appearance	  HEENT:   Normal oral mucosa, PERRL, EOMI	  Neck: No jvd , no masses  Lymphatic: No  lymphadenopathy  Pulmonary: Lungs clear to auscultation and percussion  Cardiovascular: Normal S1 S2, No JVD, No murmur, R tr edema	  Abdomen:  Soft, Non-tender, + BS  Skin: No rashes, No ecchymoses	  Extremities:  No clubbing or cyanosis  MSK: Normal range of motion, no joint swelling    LABS:		  CARDIAC MARKERS:                         8.2    12.60 )-----------( 228      ( 21 Sep 2020 06:21 )             27.6   09-21    131<L>  |  102  |  23  ----------------------------<  309<H>  4.1   |  20<L>  |  0.89    Ca    7.8<L>      21 Sep 2020 06:21  Phos  1.9     09-21  Mg     1.7     09-21    TPro  5.6<L>  /  Alb  2.3<L>  /  TBili  0.5  /  DBili  x   /  AST  23  /  ALT  23  /  AlkPhos  111  09-21    proBNP:  Lipid Profile:  HgA1c:  TSH:      Culture - Surgical Swab (collected 09-18-20 @ 17:30)  Source: .Surgical Swab right femur intramedullary #2 or  Gram Stain (09-18-20 @ 21:38):    No organisms seen    Few WBC's  Preliminary Report (09-19-20 @ 09:12):    No growth to date.    Culture - Tissue with Gram Stain (collected 09-18-20 @ 17:30)  Source: .Tissue right femur intramedullary #1 or  Gram Stain (09-18-20 @ 21:35):    No organisms seen    Rare to few White blood cells  Preliminary Report (09-19-20 @ 09:14):    No growth to date.    Culture - Tissue with Gram Stain (collected 09-18-20 @ 17:30)  Source: .Tissue right knee synovium or spec  Gram Stain (09-18-20 @ 21:32):    No organisms seen    Rare WBC's  Preliminary Report (09-19-20 @ 09:15):    No growth to date.    Culture - Tissue with Gram Stain (collected 09-18-20 @ 17:29)  Source: .Tissue right femur metaphysis or spec  Gram Stain (09-18-20 @ 21:36):    No organisms seen    Rare WBC's  Preliminary Report (09-19-20 @ 09:15):    No growth to date.    Culture - Tissue with Gram Stain (collected 09-18-20 @ 17:29)  Source: .Tissue right tibia periostrum or spec  Gram Stain (09-18-20 @ 21:31):    No organisms seen    Rare WBC's  Preliminary Report (09-19-20 @ 09:16):    No growth to date.    Culture - Tissue with Gram Stain (collected 09-18-20 @ 17:28)  Source: .Tissue right tibia membrane or spec  Gram Stain (09-18-20 @ 21:37):    No organisms seen    Rare to few White blood cells  Preliminary Report (09-19-20 @ 09:11):    No growth to date.    Culture - Tissue with Gram Stain (collected 09-18-20 @ 17:28)  Source: .Tissue right femur membrane or spec  Gram Stain (09-18-20 @ 21:34):    No organisms seen    Few WBC's  Preliminary Report (09-19-20 @ 09:17):    No growth to date.    Culture - Urine (collected 09-17-20 @ 18:33)  Source: .Urine Clean Catch (Midstream)  Final Report (09-19-20 @ 10:45):    Less than 10,000 cols/cc; Insignificant amount of growth.    Culture - Blood (collected 09-17-20 @ 09:51)  Source: .Blood Blood-Venous  Preliminary Report (09-21-20 @ 10:00):    No growth at 4 days.    Culture - Blood (collected 09-17-20 @ 09:24)  Source: .Blood Blood-Venous  Preliminary Report (09-21-20 @ 10:00):    No growth at 4 days.      TELEMETRY: A sense V paced	      QTC     ECG:  	   QTC         RADIOLOGY:   DIAGNOSTIC TESTING: [ ] Echocardiogram: [ ]  Catheterization: [ ] Stress Test:      ASSESSMENT/PLAN: 	  Severely reduced ejection fraction-The patient is ambulating without dyspnea.  Continue metoprolol digoxin and spironolactone.    Coronary artery disease-the patient denies chest discomfort. His EKG is uninterpretable secondary to the pacemaker. He was revascularized less than two years ago. There is no clinical evidence for ischemia. On rosuvastatin. The patient is history of stent thrombosis. He had a stent placed less than two years ago. continue prasugrel and aspirin.     Defibrillator-no recent significant events. Can discontinue monitor.     Septic knee- as per ID and orthopedics.

## 2020-09-21 NOTE — PROGRESS NOTE ADULT - ASSESSMENT
A/P 65M s/p placement of Abx spacer to R knee, debridement, right gastrocnemius flap with STSG coverage; also with STSG and VAC to LLE anterior lower leg  - Care per primary  - Continue with VAC without any vac changed for a total of 7 days  - Patient to be on bedrest for 7 days, at which point he can be FWB on LLE and toe touch on RLE.  No contraindication to PT from our perspective, provided that PT occurs in the bed and the patient does not perform dorsiflexion/plantarflexion of the RLE

## 2020-09-21 NOTE — PROGRESS NOTE ADULT - ASSESSMENT
Patient is a 65y old  Male who presents with a chief complaint of R Knee Swelling scheduled for a R knee abx spacer exchange and gastroc flap procedure s/p pyogenic arthritis this Friday. He has requested a podiatry consult for diabetic foot ulcers on the right foot. Right foot found to have Rose grade 1 ulcers on 2nd and 3rd digits.    Plan:  Ulcer site does not have any drainage and looks dry and closed as of 9/20/20  Possible bedside debridement of the L foot callus on the 2nd digit tomorrow  Plan discussed with Dr. Davalos  Podiatry will continue to monitor

## 2020-09-21 NOTE — PROGRESS NOTE ADULT - SUBJECTIVE AND OBJECTIVE BOX
Plastic Surgery    SUBJECTIVE:   Patient doing well today, pain moderately controlled on PCA.  Some leaking from STSG donor site (this is expected)    VITALS  T(C): 37.2 (09-21-20 @ 05:32), Max: 37.2 (09-21-20 @ 05:32)  HR: 89 (09-21-20 @ 05:32) (89 - 103)  BP: 110/52 (09-21-20 @ 05:32) (96/51 - 110/52)  BP(mean): --  ABP: --  ABP(mean): --  RR: 18 (09-21-20 @ 05:32) (17 - 19)  SpO2: 96% (09-21-20 @ 05:32) (94% - 99%)  Wt(kg): --  CVP(mm Hg): --  CI: --  CAPILLARY BLOOD GLUCOSE      POCT Blood Glucose.: 271 mg/dL (20 Sep 2020 11:28)  POCT Blood Glucose.: 185 mg/dL (20 Sep 2020 07:27)   N/A      09-20 @ 07:01  -  09-21 @ 07:00  --------------------------------------------------------  IN:    IV PiggyBack: 50 mL    IV PiggyBack: 150 mL    Lactated Ringers: 760 mL  Total IN: 960 mL    OUT:    Accordian (mL): 190 mL    Bulb (mL): 105 mL    VAC (Vacuum Assisted Closure) System (mL): 10 mL    VAC (Vacuum Assisted Closure) System (mL): 25 mL    Voided (mL): 1580 mL  Total OUT: 1910 mL    Total NET: -950 mL            PHYSICAL EXAM:   General: NAD, Lying in bed comfortably  Extrem: RLE wound vac in place holding suction.  HMV/MIS SS output.  Left shin VAc in place holding suction.    MEDICATIONS (STANDING): aspirin enteric coated 81 milliGRAM(s) Oral daily  cefepime   IVPB 2000 milliGRAM(s) IV Intermittent every 8 hours  DAPTOmycin IVPB 650 milliGRAM(s) IV Intermittent every 24 hours  dextrose 5%. 1000 milliLiter(s) IV Continuous <Continuous>  dextrose 50% Injectable 12.5 Gram(s) IV Push once  dextrose 50% Injectable 25 Gram(s) IV Push once  dextrose 50% Injectable 25 Gram(s) IV Push once  digoxin     Tablet 0.125 milliGRAM(s) Oral daily  gabapentin 100 milliGRAM(s) Oral three times a day  HYDROmorphone PCA (1 mG/mL) 30 milliLiter(s) PCA Continuous PCA Continuous  insulin lispro (HumaLOG) corrective regimen sliding scale   SubCutaneous three times a day before meals  lactated ringers. 1000 milliLiter(s) IV Continuous <Continuous>  metoprolol succinate ER 25 milliGRAM(s) Oral daily  prasugrel 10 milliGRAM(s) Oral daily  rifAMPin 300 milliGRAM(s) Oral every 12 hours  spironolactone 25 milliGRAM(s) Oral daily    MEDICATIONS (PRN):cyclobenzaprine 5 milliGRAM(s) Oral three times a day PRN Muscle Spasm  dextrose 40% Gel 15 Gram(s) Oral once PRN Blood Glucose LESS THAN 70 milliGRAM(s)/deciliter  glucagon  Injectable 1 milliGRAM(s) IntraMuscular once PRN Glucose LESS THAN 70 milligrams/deciliter  morphine  - Injectable 2 milliGRAM(s) IV Push every 4 hours PRN Breakthrough  morphine  IR 15 milliGRAM(s) Oral every 4 hours PRN Moderate Pain (4 - 6)  morphine  IR 30 milliGRAM(s) Oral every 4 hours PRN Severe Pain (7 - 10)  naloxone Injectable 0.1 milliGRAM(s) IV Push every 3 minutes PRN For ANY of the following changes in patient status:  A. RR LESS THAN 10 breaths per minute, B. Oxygen saturation LESS THAN 90%, C. Sedation score of 6  ondansetron Injectable 4 milliGRAM(s) IV Push every 6 hours PRN Nausea      LABS  CBC (09-20 @ 06:59)                              8.4<L>                         16.54<H>  )----------------(  234        --    % Neutrophils, --    % Lymphocytes, ANC: --                                  28.1<L>  CBC (09-19 @ 06:42)                              8.9<L>                         15.48<H>  )----------------(  243        --    % Neutrophils, --    % Lymphocytes, ANC: --                                  29.5<L>    BMP (09-20 @ 06:59)             132<L>  |  104     |  24<H> 		Ca++ --      Ca 8.0<L>             ---------------------------------( 200<H>		Mg 1.8                4.4     |  18<L>   |  1.00  			Ph 2.5     BMP (09-19 @ 06:42)             135     |  107     |  26<H> 		Ca++ --      Ca 7.9<L>             ---------------------------------( 119<H>		Mg 1.8                4.8     |  15<L>   |  1.00  			Ph 3.1       LFTs (09-18 @ 21:25)      TPro 5.2<L> / Alb 2.3<L> / TBili 1.0 / DBili -- / AST 21 / ALT 15 / AlkPhos 126<H>    Coags (09-19 @ 06:42)  aPTT 28.5 / INR 1.69<H> / PT 19.8<H>  Coags (09-18 @ 21:46)  aPTT 26.9<L> / INR 1.79<H> / PT 20.9<H>      ABG (09-18 @ 21:36)     7.34<L> / 32<L> / 76<L> / 17<L> / -7.9<L> / 94<L>%     Lactate:    ABG (09-18 @ 21:25)      /  /  /  /  / %     Lactate:  1.4        IMAGING STUDIES

## 2020-09-21 NOTE — CONSULT NOTE ADULT - SUBJECTIVE AND OBJECTIVE BOX
Vascular Access Service Consult Note    [facility]    Diagnosis: knee infection    Indications for Vascular Access (Check all that apply)  [ x ]  Antibiotic Therapy       Antibiotic Prescribed:         cefepime and daptomycin                                                                 [  ]  IV Hydration  [  ]  Total Parenteral Nutrition  [  ]  Chemotherapy  [  ]  Difficult Venous Access  [  ]  CVP monitoring  [  ]  Medications with high potential for tissue necrosis on extravasation  [  ]  Other    Screening (Check all that apply)  Previous Radiation to chest  [  ] Yes      [ x ]  No  Breast Cancer                          [  ] Left     [  ]  Right    [ x ]  No  Lymph Node Dissection         [  ] Left     [  ]  Right    [x  ]  No  Pacemaker or ICD                   [  ] Left     [  ]  Right    [ x ]  No  Upper Extremity DVT             [  ] Left     [  ]  Right    [ x ]  No  Chronic Kidney Disease         [  ]  Yes     [ x ]  No  Hemodialysis                           [  ]  Yes     [ x ]  No  AV Fistula/ Graft                     [  ]  Left    [  ]  Right    [ x ]  No  Temp>101F in past 24 H       [  ]  Yes     [x  ]  No  H/O PICC/Midline                   [x  ]  Yes     [  ]  No    Lab data:                        8.2    12.60 )-----------( 228      ( 21 Sep 2020 06:21 )             27.6     09-21    131<L>  |  102  |  23  ----------------------------<  309<H>  4.1   |  20<L>  |  0.89    Ca    7.8<L>      21 Sep 2020 06:21  Phos  1.9     09-21  Mg     1.7     09-21    TPro  5.6<L>  /  Alb  2.3<L>  /  TBili  0.5  /  DBili  x   /  AST  23  /  ALT  23  /  AlkPhos  111  09-21              I have reviewed the chart, interviewed and examined the patient and determined that this patient:  [  x] Is a candidate for a PICC line  [  ] Is a candidate for a Midline  [  ] Is not a candidate for vascular access device (reason)    Lumens:    [ x ] Single  [  ] Double

## 2020-09-21 NOTE — PROCEDURE NOTE - NSPROCDETAILS_GEN_ALL_CORE
sterile technique, catheter placed/ultrasound assessment/ultrasound guidance/location identified, draped/prepped, sterile technique used/sterile dressing applied

## 2020-09-22 LAB
ALBUMIN SERPL ELPH-MCNC: 2.1 G/DL — LOW (ref 3.3–5)
ALP SERPL-CCNC: 112 U/L — SIGNIFICANT CHANGE UP (ref 40–120)
ALT FLD-CCNC: 23 U/L — SIGNIFICANT CHANGE UP (ref 10–45)
ANION GAP SERPL CALC-SCNC: 9 MMOL/L — SIGNIFICANT CHANGE UP (ref 5–17)
AST SERPL-CCNC: 27 U/L — SIGNIFICANT CHANGE UP (ref 10–40)
BASOPHILS # BLD AUTO: 0.08 K/UL — SIGNIFICANT CHANGE UP (ref 0–0.2)
BASOPHILS NFR BLD AUTO: 0.7 % — SIGNIFICANT CHANGE UP (ref 0–2)
BILIRUB SERPL-MCNC: 0.6 MG/DL — SIGNIFICANT CHANGE UP (ref 0.2–1.2)
BUN SERPL-MCNC: 19 MG/DL — SIGNIFICANT CHANGE UP (ref 7–23)
CALCIUM SERPL-MCNC: 8.2 MG/DL — LOW (ref 8.4–10.5)
CHLORIDE SERPL-SCNC: 105 MMOL/L — SIGNIFICANT CHANGE UP (ref 96–108)
CO2 SERPL-SCNC: 20 MMOL/L — LOW (ref 22–31)
CREAT SERPL-MCNC: 0.89 MG/DL — SIGNIFICANT CHANGE UP (ref 0.5–1.3)
CRP SERPL-MCNC: 10.57 MG/DL — HIGH (ref 0–0.4)
CULTURE RESULTS: SIGNIFICANT CHANGE UP
CULTURE RESULTS: SIGNIFICANT CHANGE UP
EOSINOPHIL # BLD AUTO: 0.45 K/UL — SIGNIFICANT CHANGE UP (ref 0–0.5)
EOSINOPHIL NFR BLD AUTO: 4.2 % — SIGNIFICANT CHANGE UP (ref 0–6)
ERYTHROCYTE [SEDIMENTATION RATE] IN BLOOD: 37 MM/HR — HIGH
GLUCOSE BLDC GLUCOMTR-MCNC: 134 MG/DL — HIGH (ref 70–99)
GLUCOSE BLDC GLUCOMTR-MCNC: 141 MG/DL — HIGH (ref 70–99)
GLUCOSE BLDC GLUCOMTR-MCNC: 182 MG/DL — HIGH (ref 70–99)
GLUCOSE BLDC GLUCOMTR-MCNC: 207 MG/DL — HIGH (ref 70–99)
GLUCOSE SERPL-MCNC: 135 MG/DL — HIGH (ref 70–99)
HCT VFR BLD CALC: 30.6 % — LOW (ref 39–50)
HGB BLD-MCNC: 8.9 G/DL — LOW (ref 13–17)
IMM GRANULOCYTES NFR BLD AUTO: 0.6 % — SIGNIFICANT CHANGE UP (ref 0–1.5)
LYMPHOCYTES # BLD AUTO: 0.95 K/UL — LOW (ref 1–3.3)
LYMPHOCYTES # BLD AUTO: 8.8 % — LOW (ref 13–44)
MAGNESIUM SERPL-MCNC: 1.8 MG/DL — SIGNIFICANT CHANGE UP (ref 1.6–2.6)
MCHC RBC-ENTMCNC: 22.1 PG — LOW (ref 27–34)
MCHC RBC-ENTMCNC: 29.1 GM/DL — LOW (ref 32–36)
MCV RBC AUTO: 75.9 FL — LOW (ref 80–100)
MONOCYTES # BLD AUTO: 1.41 K/UL — HIGH (ref 0–0.9)
MONOCYTES NFR BLD AUTO: 13 % — SIGNIFICANT CHANGE UP (ref 2–14)
NEUTROPHILS # BLD AUTO: 7.87 K/UL — HIGH (ref 1.8–7.4)
NEUTROPHILS NFR BLD AUTO: 72.7 % — SIGNIFICANT CHANGE UP (ref 43–77)
NIGHT BLUE STAIN TISS: SIGNIFICANT CHANGE UP
NRBC # BLD: 0 /100 WBCS — SIGNIFICANT CHANGE UP (ref 0–0)
PHOSPHATE SERPL-MCNC: 2.4 MG/DL — LOW (ref 2.5–4.5)
PLATELET # BLD AUTO: 252 K/UL — SIGNIFICANT CHANGE UP (ref 150–400)
POTASSIUM SERPL-MCNC: 4.1 MMOL/L — SIGNIFICANT CHANGE UP (ref 3.5–5.3)
POTASSIUM SERPL-SCNC: 4.1 MMOL/L — SIGNIFICANT CHANGE UP (ref 3.5–5.3)
PROT SERPL-MCNC: 5.7 G/DL — LOW (ref 6–8.3)
RBC # BLD: 4.03 M/UL — LOW (ref 4.2–5.8)
RBC # FLD: 23.5 % — HIGH (ref 10.3–14.5)
SODIUM SERPL-SCNC: 134 MMOL/L — LOW (ref 135–145)
SPECIMEN SOURCE: SIGNIFICANT CHANGE UP
WBC # BLD: 10.82 K/UL — HIGH (ref 3.8–10.5)
WBC # FLD AUTO: 10.82 K/UL — HIGH (ref 3.8–10.5)

## 2020-09-22 PROCEDURE — 76705 ECHO EXAM OF ABDOMEN: CPT | Mod: 26

## 2020-09-22 PROCEDURE — 99232 SBSQ HOSP IP/OBS MODERATE 35: CPT

## 2020-09-22 PROCEDURE — 99232 SBSQ HOSP IP/OBS MODERATE 35: CPT | Mod: GC

## 2020-09-22 RX ORDER — SODIUM,POTASSIUM PHOSPHATES 278-250MG
2 POWDER IN PACKET (EA) ORAL ONCE
Refills: 0 | Status: COMPLETED | OUTPATIENT
Start: 2020-09-22 | End: 2020-09-22

## 2020-09-22 RX ORDER — GABAPENTIN 400 MG/1
300 CAPSULE ORAL THREE TIMES A DAY
Refills: 0 | Status: DISCONTINUED | OUTPATIENT
Start: 2020-09-22 | End: 2020-10-03

## 2020-09-22 RX ORDER — KETOROLAC TROMETHAMINE 30 MG/ML
15 SYRINGE (ML) INJECTION EVERY 8 HOURS
Refills: 0 | Status: DISCONTINUED | OUTPATIENT
Start: 2020-09-22 | End: 2020-09-22

## 2020-09-22 RX ORDER — MORPHINE SULFATE 50 MG/1
4 CAPSULE, EXTENDED RELEASE ORAL EVERY 4 HOURS
Refills: 0 | Status: DISCONTINUED | OUTPATIENT
Start: 2020-09-22 | End: 2020-09-28

## 2020-09-22 RX ADMIN — NYSTATIN CREAM 1 APPLICATION(S): 100000 CREAM TOPICAL at 17:44

## 2020-09-22 RX ADMIN — CEFEPIME 100 MILLIGRAM(S): 1 INJECTION, POWDER, FOR SOLUTION INTRAMUSCULAR; INTRAVENOUS at 02:30

## 2020-09-22 RX ADMIN — Medication 8 UNIT(S): at 07:42

## 2020-09-22 RX ADMIN — NYSTATIN CREAM 1 APPLICATION(S): 100000 CREAM TOPICAL at 05:43

## 2020-09-22 RX ADMIN — PRASUGREL 10 MILLIGRAM(S): 5 TABLET, FILM COATED ORAL at 12:00

## 2020-09-22 RX ADMIN — Medication 15 MILLIGRAM(S): at 23:00

## 2020-09-22 RX ADMIN — DAPTOMYCIN 126 MILLIGRAM(S): 500 INJECTION, POWDER, LYOPHILIZED, FOR SOLUTION INTRAVENOUS at 01:23

## 2020-09-22 RX ADMIN — GABAPENTIN 100 MILLIGRAM(S): 400 CAPSULE ORAL at 05:43

## 2020-09-22 RX ADMIN — Medication 2 PACKET(S): at 11:59

## 2020-09-22 RX ADMIN — MORPHINE SULFATE 30 MILLIGRAM(S): 50 CAPSULE, EXTENDED RELEASE ORAL at 02:15

## 2020-09-22 RX ADMIN — Medication 8 UNIT(S): at 12:14

## 2020-09-22 RX ADMIN — CEFEPIME 100 MILLIGRAM(S): 1 INJECTION, POWDER, FOR SOLUTION INTRAMUSCULAR; INTRAVENOUS at 17:42

## 2020-09-22 RX ADMIN — Medication 2: at 17:42

## 2020-09-22 RX ADMIN — MORPHINE SULFATE 30 MILLIGRAM(S): 50 CAPSULE, EXTENDED RELEASE ORAL at 18:41

## 2020-09-22 RX ADMIN — Medication 0.12 MILLIGRAM(S): at 05:42

## 2020-09-22 RX ADMIN — CEFEPIME 100 MILLIGRAM(S): 1 INJECTION, POWDER, FOR SOLUTION INTRAMUSCULAR; INTRAVENOUS at 11:57

## 2020-09-22 RX ADMIN — Medication 4: at 22:06

## 2020-09-22 RX ADMIN — MORPHINE SULFATE 4 MILLIGRAM(S): 50 CAPSULE, EXTENDED RELEASE ORAL at 22:05

## 2020-09-22 RX ADMIN — Medication 81 MILLIGRAM(S): at 17:42

## 2020-09-22 RX ADMIN — Medication 15 MILLIGRAM(S): at 22:07

## 2020-09-22 RX ADMIN — Medication 8 UNIT(S): at 17:41

## 2020-09-22 RX ADMIN — MORPHINE SULFATE 30 MILLIGRAM(S): 50 CAPSULE, EXTENDED RELEASE ORAL at 07:42

## 2020-09-22 RX ADMIN — INSULIN GLARGINE 20 UNIT(S): 100 INJECTION, SOLUTION SUBCUTANEOUS at 22:06

## 2020-09-22 RX ADMIN — MORPHINE SULFATE 30 MILLIGRAM(S): 50 CAPSULE, EXTENDED RELEASE ORAL at 19:00

## 2020-09-22 RX ADMIN — MORPHINE SULFATE 4 MILLIGRAM(S): 50 CAPSULE, EXTENDED RELEASE ORAL at 22:20

## 2020-09-22 RX ADMIN — Medication 81 MILLIGRAM(S): at 05:43

## 2020-09-22 RX ADMIN — MORPHINE SULFATE 30 MILLIGRAM(S): 50 CAPSULE, EXTENDED RELEASE ORAL at 01:22

## 2020-09-22 RX ADMIN — POLYETHYLENE GLYCOL 3350 17 GRAM(S): 17 POWDER, FOR SOLUTION ORAL at 11:57

## 2020-09-22 RX ADMIN — CHLORHEXIDINE GLUCONATE 1 APPLICATION(S): 213 SOLUTION TOPICAL at 10:00

## 2020-09-22 RX ADMIN — GABAPENTIN 300 MILLIGRAM(S): 400 CAPSULE ORAL at 22:09

## 2020-09-22 NOTE — CONSULT NOTE ADULT - SUBJECTIVE AND OBJECTIVE BOX
65M w/ PMHx of CAD s/p CABG, CHF, DM, HLD, HTN, acute cholecystitis s/p perc sudhir 2/28 (drain removed in late May 2020), recent R TKA 7/22 presented to [facility] with R knee infection s/p R knee hardware exchange on 9/4 and R knee spacer w/ flap from plastics on 9/18. General surgery was consulted on 9/2 as there was purulent drainage coming from his prior perc sudhir site. He underwent a bedside I&D on 9/4 with 3cc of pus drained, cultures grew psuedomonas. A HIDA was obtained which was equivocal due to the patient's inability to complete the scan and a gallium study was also obtained which showed no defined tract between the gallbladder and subcutaneous collection. General surgery signed off on 9/15 due to the minimal to no drainage that was coming from the site. Since then, the ortho team noticed an increase in the amount of purulent drainage coming from the site. He is being followed by ID who has him on dapto, cefepime, and rifampin. No recent fevers and WBC is downtrending. General surgery was consulted again due to the increase in drainage.     Vital Signs Last 24 Hrs  T(C): 37.1 (22 Sep 2020 13:49), Max: 37.3 (21 Sep 2020 18:00)  T(F): 98.7 (22 Sep 2020 13:49), Max: 99.2 (21 Sep 2020 18:00)  HR: 90 (22 Sep 2020 13:49) (76 - 90)  BP: 112/61 (22 Sep 2020 13:49) (93/50 - 112/61)  BP(mean): --  RR: 15 (22 Sep 2020 13:49) (15 - 17)  SpO2: 97% (22 Sep 2020 13:49) (97% - 100%)    Physical Exam:  General: NAD  Pulmonary: Nonlabored breathing, no respiratory distress  Cardiovascular: regular rate and rhythm, no murmurs   Abdominal: soft, nondistended, nontender with no rebound or guarding, RUQ prior MIS site with minimal surrounding erythema, small amount of pus was able to be expressed with minimal fluctuance appreciated   Extremities: WWP, R knee immobilizer in place, L skin wound vac in place with good seal   Neuro: A/O x3    I&O's Summary    21 Sep 2020 07:01  -  22 Sep 2020 07:00  --------------------------------------------------------  IN: 0 mL / OUT: 1200 mL / NET: -1200 mL    22 Sep 2020 07:01  -  22 Sep 2020 15:02  --------------------------------------------------------  IN: 50 mL / OUT: 490 mL / NET: -440 mL        LABS:                        8.9    10.82 )-----------( 252      ( 22 Sep 2020 06:54 )             30.6     09-22    134<L>  |  105  |  19  ----------------------------<  135<H>  4.1   |  20<L>  |  0.89    Ca    8.2<L>      22 Sep 2020 06:54  Phos  2.4     09-22  Mg     1.8     09-22    TPro  5.7<L>  /  Alb  2.1<L>  /  TBili  0.6  /  DBili  x   /  AST  27  /  ALT  23  /  AlkPhos  112  09-22        CAPILLARY BLOOD GLUCOSE      POCT Blood Glucose.: 141 mg/dL (22 Sep 2020 11:49)  POCT Blood Glucose.: 134 mg/dL (22 Sep 2020 07:26)  POCT Blood Glucose.: 125 mg/dL (21 Sep 2020 22:28)  POCT Blood Glucose.: 170 mg/dL (21 Sep 2020 17:42)    LIVER FUNCTIONS - ( 22 Sep 2020 06:54 )  Alb: 2.1 g/dL / Pro: 5.7 g/dL / ALK PHOS: 112 U/L / ALT: 23 U/L / AST: 27 U/L / GGT: x             RADIOLOGY & ADDITIONAL STUDIES:

## 2020-09-22 NOTE — PROGRESS NOTE ADULT - ASSESSMENT
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  He has completed 6 weeks of vancomycin & rifampin.  He is s/p R hip exchange of hardware on 9/4 - OR cultures NGTD.  I&D of abd wall done 9/4 - cultures growing Pseudomonas and Enterococcus faecium. Gallium SPECT done 9/10 showed minimal uptake from GB fossa to soft tissue and was interpreted by surgery as a negative study. S/p R knee spacer exchange, muscle flap and L shin ulcer debridement on 9/18.  Now again with purulent drainage from RUQ.    Suggest:  - f/u cultures from RUQ drainage  - Continue daptomycin  675 mg IV q24h  - Continue cefepime 2 g IV q8h  - Continue rifampin 300 mg po q12h.    ID team 1 will follow

## 2020-09-22 NOTE — PROGRESS NOTE ADULT - SUBJECTIVE AND OBJECTIVE BOX
INTERVAL HPI/OVERNIGHT EVENTS:  Seen at bedside. Endorsed continues drainage from RUQ  Cultures from drainage in process but with no WBC, G stain negative  Afebrile      CONSTITUTIONAL:  No fever, chills, night sweats  EYES:  No photophobia or visual changes  CARDIOVASCULAR:  No chest pain  RESPIRATORY:  No cough, wheezing, or SOB   GASTROINTESTINAL:  No nausea, vomiting, diarrhea, constipation, or abdominal pain  GENITOURINARY:  No frequency, urgency, dysuria or hematuria  NEUROLOGIC:  No headache, lightheadedness      ANTIBIOTICS/RELEVANT:  Daptomycin 675 mg IV q24h (9/6-present)  Cefepime 2 g IV q8h (9/6-present)  Rifampin 300 mg po q12h            Vital Signs Last 24 Hrs  T(C): 37.1 (22 Sep 2020 17:11), Max: 37.3 (21 Sep 2020 18:00)  T(F): 98.8 (22 Sep 2020 17:11), Max: 99.2 (21 Sep 2020 18:00)  HR: 92 (22 Sep 2020 17:11) (76 - 92)  BP: 101/58 (22 Sep 2020 17:11) (93/50 - 112/61)  BP(mean): --  RR: 16 (22 Sep 2020 17:11) (15 - 17)  SpO2: 96% (22 Sep 2020 17:11) (96% - 100%)    PHYSICAL EXAM:  Constitutional:  Alert, nontoxic appearing  Eyes:  Sclerae anicteric, conjunctivae clear, PERRL  Ear/Nose/Throat:  No nasal exudate or sinus tenderness;  No buccal mucosal lesions, no pharyngeal erythema or exudate	  Neck:  Supple, no adenopathy  Respiratory:  Clear bilaterally  Cardiovascular:  RRR, S1S2, no murmur appreciated  Gastrointestinal:  RUQ ~1 cm incision with no surrounding erythema/edema with minimal purulent drainage, better than yesterday.   Normoactive BS, soft, NT, no masses, guarding or rebound.  No HSM.  No CVAT          LABS:                        8.9    10.82 )-----------( 252      ( 22 Sep 2020 06:54 )             30.6         09-22    134<L>  |  105  |  19  ----------------------------<  135<H>  4.1   |  20<L>  |  0.89    Ca    8.2<L>      22 Sep 2020 06:54  Phos  2.4     09-22  Mg     1.8     09-22    TPro  5.7<L>  /  Alb  2.1<L>  /  TBili  0.6  /  DBili  x   /  AST  27  /  ALT  23  /  AlkPhos  112  09-22          MICROBIOLOGY:    Blood cultures:  9/1 X 2 - NG  Abd wall wound 9/3 - VRE. faecium and Pseudomonas aeruginosa    OR cultures R hip 9/4:  NGTD    Cultures from abd wall I&D 9/4 - VRE. faecium and Pseudomonas aeruginosa      RADIOLOGY & ADDITIONAL STUDIES:

## 2020-09-22 NOTE — PROGRESS NOTE ADULT - SUBJECTIVE AND OBJECTIVE BOX
Ortho Note    Pt complains of severe pain in legs R > L overnight after PCA was discontinued. Pt also complains that his RUQ wound is leaking bilious fluid again. Was unable to sleep last night 2/2 pain.   Denies CP, SOB, N/V    Vital Signs Last 24 Hrs  T(C): 36.4 (22 Sep 2020 05:29), Max: 37.4 (21 Sep 2020 13:21)  T(F): 97.5 (22 Sep 2020 05:29), Max: 99.3 (21 Sep 2020 13:21)  HR: 80 (22 Sep 2020 05:29) (76 - 87)  BP: 99/62 (22 Sep 2020 05:29) (93/50 - 109/61)  BP(mean): --  RR: 16 (22 Sep 2020 05:29) (15 - 18)  SpO2: 100% (22 Sep 2020 05:29) (96% - 100%)    RLE:  DSG  R knee Wound vac holding suction at 125, MIS x1, HV x1, in KI, proximal donor site covered in tegaderm (changed 9/21)  Pulses: +DP;  feet cool to touch; at baseline; no edema  Sensation: Sensation intact to baseline (diminished sensation- h/o severe neuropathy; follows with vascular Dr. Malloy outpatient)  Motor: 5/5 EHL/FHL    LLE- Wound vac holding suction at 125, proximal donor site covered in tegaderm (changed 9/21)  Pulses: feet cool to touch; at baseline; no edema  Sensation: SILT to baseline (diminished sensation- h/o severe neuropathy; follows with vascular Dr. Malloy outpatient)  Motor: 5/5 EHL/FHL/TA/GS               A/P: 65yMale s/p R Knee Revision Antibiotic Spacer by Dr. Castro, R Knee medial gastroc flap and LLE STSG by Dr. Mckeon on 9/18  - Stepped down from SICU on 9/19 to 9WO  - s/p 3u PRBC intraop due to intraop blood loss, postop hgb 9.0, currently stable  - Pt accidentally pulled R arm PICC line 9/21 AM, was replaced on 9/21 by PICC t eam  - Pain Control, continue pain mgmt recs  - DVT ppx: ASA + effient (home med) restarted 9/19  - WBS: TTWB RLE in KI at all times, Strict bedrest x7 days as per Dr. Mckeon for RLE flap healing  - Can have PT evaluate but in bed exercises only  - continue bowel regimen  - appreciate ID recs- Continue dapto, cefepime, rifampin; check CPK 9/21 with dapto; f/o CBC with diff and CMP (elevated liver enzymes in past with rifampin)  - appreciate internal medicine, and cardiology recs, resumed all pre-op meds  - appreciate plastics recs- wound vacs staying on x7 days until 9/25  - appreciate gen surg recs- will need cholecystectomy (elective) in near future, currently no abd pain, n/v; dressing changes as per nursing; increased drainage from RUQ today  - appreciate psych recs- currently feels well on regimen today, will reconsult as needed  - podiatry following for R foot diabetic ulcers x2- debrided at bedside; bactroban applied and dsg changes as per podiatry  - dispo: pending  - ADAT      Ortho Pager [phone]

## 2020-09-22 NOTE — CONSULT NOTE ADULT - REASON FOR ADMISSION
R Knee Swelling

## 2020-09-22 NOTE — PROGRESS NOTE ADULT - SUBJECTIVE AND OBJECTIVE BOX
Ortho Note    Pt seen and examined. Reports pain as moderately controlled.  Denies CP, SOB, N/V, numbness/tingling     VSS    RLE:  DSG: R knee Wound vac holding suction at 125, MIS x1, HV x1, in KI, proximal donor site covered in tegaderm; kerlix to R foot debridement site (podiatry dsg)  Pulses: +DP;  feet cool to touch; at baseline; no edema  Sensation: Sensation intact to baseline (diminished sensation- h/o severe neuropathy; follows with vascular Dr. Malloy outpatient)  Motor: 5/5 EHL/FHL; limiting dorsi and plantar flexion on R as per plastics    LLE:  DSG: Wound vac holding suction at 125; tegaderm to proximal donor site CDI; kerlix to L foot debridement site CDI  Pulses: feet cool to touch; at baseline; no edema  Sensation: SILT to baseline (diminished sensation- h/o severe neuropathy; follows with vascular Dr. Malloy outpatient)  Motor: 5/5 EHL/FHL/TA/GS                          8.9    10.82 )-----------( 252      ( 22 Sep 2020 06:54 )             30.6   22 Sep 2020 06:54    134    |  105    |  19     ----------------------------<  135    4.1     |  20     |  0.89     Phos  2.4       22 Sep 2020 06:54  Mg     1.8       22 Sep 2020 06:54    TPro  5.7    /  Alb  2.1    /  TBili  0.6    /  DBili  x      /  AST  27     /  ALT  23     /  AlkPhos  112    22 Sep 2020 06:54        A/P: 65yMale admitted for R knee pain, drainage s/p right knee arthroscopic I+D 7/17, right knee explant and abx spacer 7/22 with Dr. Castro;  and debridement of LLE wound with wound vac placement on 7/30 by Dr. Bowen. Was original d/c'd home on 8/10/20 with PICC and vancomycin IV q12h + rifampin.  Readmitted, and now s/p R hip exchange of hardware on 09/04/2020; I+D of RUQ on 09/04/2020; R Knee Revision Antibiotic Spacer by Dr. Castro, R Knee medial gastroc flap and LLE STSG by Dr. Mckeon on 9/18  - afebrile since 9/17/20 when workup for sepsis was negative (BC NGTD, elevated WBC resolved; surgical wounds did not appear infected; UA negative)  -ESR/CRP elevated today which is expected 72h post-op. Will repeat ESR, CBC on Thursday 9/24.   - diabetes mellitus II with hyperglycemia- Patient has been on sliding scale post-op through the weekend with elevated sugars (250-350). Was well-controlled pre-op. Sugars better controlled since placed on lantus and pre-meal insulin yesterday  - protein calorie malnutrition/ uncontrolled diabetes- Nutrition services consulted, appreciate recsl; continue glucerna shakes  - Hypophosphatemia-resolving, Ph 2.4- supplemented orally again today; Hyponatremia-resolving with 1.5L fluid restriction Na 134, asymptomatic- continue to monitor, appreciate internal medicine recs  - H+H 8.9/30.6- S/p 3units PRBCs in OR on Friday 9/19 (H+H day of surgery 9.2/33.0), and close ICU monitoring. Currently asymptomatic and at baseline of Hgb levels from prior admissions. Continue to monitor labs; transfuse for Hgb < 8.0.   - Pain Control- appreciate pain management recs, gabapentin increased to tid; 2x dose of toradol 15mg IV added as per Dr. Castro. Avoiding long-term NSAIDs in the setting of CAD. Avoiding standing tylenol in the setting of elevated liver enzymes in the past while on rifampin  - DVT ppx: ASA bid+ effient (home med)  - PT, WBS: TTWB RLE; WBAT LLE; Strict bed rest and PT to be held until Friday 9/25 as per Plastics. Bed exercises OK. No dorsiflexion or plantar flexion of RLE  - aggressive I/S, bowel regimen (reports regular BMs since surgery 9/18)  - appreciate ID recs- Continue dapto, cefepime, rifampin; f/u CPK with dapto (elevated at 209 today from 60 on 9/14); f/o CBC with diff and CMP (elevated liver enzymes in past with rifampin); new PICC line to be inserted today (9/20)  - appreciate internal medicine, and cardiology recs   - appreciate plastics recs- Wound vacs on LLE, and R knee to be continued until Friday (possible wound vac changes/ discontinuation Friday 9/25); continue HV and MIS x 1 for now and monitor output.   - Gen surg reconsulted for increased thick yellow drainage from RUQ I+D site-Cultures were sent and are negative. Ultrasound ordered as per gen surg team, will need elective cholecystectomy in near future; continue to f/u recs  - appreciate psych recs- currently feels well on regimen today, will reconsult as needed  - R diabetic foot ulcers x 2- debrided 9/16 by podiatry; L ulcer debrided today 9/22- dsg changes and care as per podiatry  - dispo: pending medical stabilization/ OOB status Friday; likely PINKY    Ortho Pager [phone]

## 2020-09-22 NOTE — PROGRESS NOTE ADULT - SUBJECTIVE AND OBJECTIVE BOX
Patient is a 65y old  Male who presents with a chief complaint of R Knee Swelling (22 Sep 2020 11:22)      INTERVAL HPI/ OVERNIGHT EVENTS: No overnight events reported. Pt seen and examined at bedside. Dr. Davalos rounded on the patient today and addressed his foot concerns, the patient states that he understands the importance of following up with a Podiatrist routinely, given his history with pedal concerns/ surgeries. The patient denies any N/V/F/SOB.       LABS                        8.9    10.82 )-----------( 252      ( 22 Sep 2020 06:54 )             30.6     09-22    134<L>  |  105  |  19  ----------------------------<  135<H>  4.1   |  20<L>  |  0.89    Ca    8.2<L>      22 Sep 2020 06:54  Phos  2.4     09-22  Mg     1.8     09-22    TPro  5.7<L>  /  Alb  2.1<L>  /  TBili  0.6  /  DBili  x   /  AST  27  /  ALT  23  /  AlkPhos  112  09-22      ESR: 37  CRP: --  09-22 @ 06:54    ICU Vital Signs Last 24 Hrs  T(C): 36.4 (22 Sep 2020 05:29), Max: 37.4 (21 Sep 2020 13:21)  T(F): 97.5 (22 Sep 2020 05:29), Max: 99.3 (21 Sep 2020 13:21)  HR: 80 (22 Sep 2020 05:29) (76 - 87)  BP: 99/62 (22 Sep 2020 05:29) (93/50 - 102/51)  BP(mean): --  ABP: --  ABP(mean): --  RR: 16 (22 Sep 2020 05:29) (15 - 17)  SpO2: 100% (22 Sep 2020 05:29) (97% - 100%)      RADIOLOGY    MICROBIOLOGY    PHYSICAL EXAM  Lower Extremity Focused  Vasc: DP/PT 1-4 b/l, +2 nisa malleolar and dorsum of the foot pitting edema b/l, CFT <3sec b/l  Derm: Right foot - 2nd digit ulcer at tip of toe, no drainage, no malodor, hallux maceration improving; small circular lesions present throughout his leg d/t to injuries related to his cast, Tinea Pedia b/l, onychomycosis. L foot: ulcer on the tip of the 2nd digit and on submet 2   Neuro: Protective sensations not intact from the distal tibia down (stocking distribution)  MSK: Partial amp of digits 2 and 3 on L foot, hammertoes on digits 2-5 on R foot, Hallux rigidus b/l

## 2020-09-22 NOTE — CONSULT NOTE ADULT - CONSULT REASON
Assessment of this skin defect on left anterior tibia that was previously debrided by Dr. Malloy and grafter by Plastics.
Assistance with management
Post Op HD monitoring
Preoperative evaluation
R knee Pain
RUQ drainage from prior drain site
Ulcers on the R foot
perc sudhir site infection
preop
vascular access
Right knee open wound  Left leg open wound

## 2020-09-22 NOTE — PROGRESS NOTE ADULT - ATTENDING COMMENTS
I have reviewed the medical record, including laboratory and radiographic studies, interviewed and examined the patient and discussed the plan with Dr. Noel, the ID Resident.  Agree with above. Abd wall drainage more serous today - was grossly purulent yesterday.  Will continue to follow with you – ID Team 1.

## 2020-09-22 NOTE — CONSULT NOTE ADULT - CONSULT REQUESTED DATE/TIME
02-Sep-2020 05:17
02-Sep-2020 08:30
02-Sep-2020 14:36
02-Sep-2020 20:17
03-Sep-2020 18:31
09-Sep-2020 14:00
16-Sep-2020 12:24
18-Sep-2020 21:15
21-Sep-2020 12:25
22-Sep-2020 14:52
13-Sep-2020 08:43

## 2020-09-22 NOTE — PROGRESS NOTE ADULT - ASSESSMENT
Patient is a 65y old  Male who presents with a chief complaint of R Knee Swelling scheduled for a R knee abx spacer exchange and gastroc flap procedure s/p pyogenic arthritis this Friday. He has requested a podiatry consult for diabetic foot ulcers on the right foot. Right foot found to have Rose grade 1 ulcers on 2nd and 3rd digits.    Plan:  Debridement of Callus present on the superior aspect of the 2nd digit of L foot using a sterile #15 blade & found to have an abscess underneath the hyperkeratotic lesion  Bedside I&D of abscess on the superior aspect of the 2nd digit of the L foot performed in an aseptic manner  Left foot was dressed with DSD and mupirocin   Ulcer site on the R 2nd digit does not have any drainage and looks dry and closed- mupirocin applied to the site   Plan discussed with Dr. Davalos  Podiatry will continue to monitor

## 2020-09-22 NOTE — PROGRESS NOTE ADULT - ATTENDING COMMENTS
Seen by me. Pain getting under better control. Complying with bedrest and RLE motion restrictions.     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  RUE Duplex  Rest of plan as above

## 2020-09-22 NOTE — CONSULT NOTE ADULT - ASSESSMENT
A/P:  66y/o M with h/o DM and non-healing wounds of the right and left lower extremity. He will go for exchange of antibiotic spacer on Friday. PRS has been requested to provider vascularized muscle flap coverage of the right knee and to cover the left leg wound. He understands the risks of infection, flap failure/loss, skin graft failure/loss, bleeding, injury to neurovascular structures, DVT/PE and the potential for revision surgery or re-operation.    Consent obtained and placed in chart.   
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  He is now s/p arthroscopic I&D in view of prior difficulties with his incision/wound and other co-morbidities.  Unfortunately, he was on cipro while this occurred - no organisms seen on initial gram stain.  Although infection at present may represent persistence from November (MRSA), cannot presume, particularly in view of ST compromise.  He has had extensive antibiotic exposure in the interim and has spent a long time on inpatient services.  He had transaminitis with rifampin in past - would not attempt to add unless Staph grows.   9 day s/p right knee arthroscopic I and D, 7/17/2020  4 days s/p right knee explant, 7/22/2020
65M w/ PMHx of CAD s/p CABG, CHF, DM, HLD, HTN, acute cholecystitis s/p perc sudhir 2/28 (drain removed in late May 2020), recent R TKA 7/22 presented to [facility] with R knee infection s/p R knee hardware exchange on 9/4 and R knee spacer w/ flap from plastics on 9/18. General surgery re-consulted for continued purulent drainage coming from his prior MIS site. Afebrile, HDS. WBC downtrending. RUQ ultrasound pending.     ****Final recommendations pending RUQ U/S results****    Surgery Team 4C will continue to follow. Please page Team 4 with questions/clinical changes. [phone]
65yMale s/p R knee antibiotic spacer 07/22 who presented with R knee swelling and RUQ wound drainage on 09/01/2020. Patient had a diabetic nonhealing wound back in July 202 that was debrided by Dr. Malloy and grafting by plastic surgery recently. Vascular surgery consulted to assess the wound.    Plan  - Wound care per plastic surgery  - Antibiotics per ID recs  - We will continue to follow  - Plan discussed with chief
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  He has completed 6 weeks of vancomycin & rifampin.  Inflammatory markers remained very elevated as outpatient, inpatient values are elevated but less so and improved from discharge last month.  His knee was aspirated by Dr. Castro last night - small amount of blood was obtained, problems with cell count and culture.  He has other potential sources for infection/inflammation and is undergoing evaluation by Surgery regarding reported RUQ persistent drainage.  RUQ US was done but is unrevealing and suboptimal study.  No drainage seen by me on exam at this time and liver enzymes are WNL with mildly elevated alk phos.  LLE ulcer does not appear grossly infected at this time.  Suggest:  - Continue to f/u blood cultures from 9/1  - Trend ESR/CRP  - Continue vancomycin 1 g IV q12h - check trough as you plan.  Goal;  14-18  - Continue rifampin 300 mg po q12h  - Consider CT abd w/wo IV contrast  - Will discuss further plans for evaluation with Dr. Castro.  Recommendations discussed with primary team.  Will follow with you - team 1.
Patient is a 65y old  Male who presents with a chief complaint of R Knee Swelling scheduled for a R knee abx spacer exchange and gastroc flap procedure s/p pyogenic arthritis this Friday. He has requested a podiatry consult for diabetic foot ulcers on the right foot.     Plan:  Asceptic debridement of the calluses present on the superior aspect of the 2nd digit and dorsum of the 3rd using a #10 blade  Applied Bactroban on the 2 calluses debrided   Applied betadine to the maceration on the plantar aspect of the right foot hallux   Ordered b/l Xrays of the foot- 2 views  Dressed the foot in DSD and advised patient to avoid pressure to the ulcer sites  Plan discussed with Dr. Davalos  Podiatry will continue to follow
66 yo M h/o CAD (s/p CABG 2018), CHF (EF 15%) Diabetes, HLD, HTN, R Revision Total Knee Arthroplasty with Antibiotic Spacer on 7/22 taken to OR for explant of spacer, new spacer placement and plastic surgery closure with significant blood loss admitted to SICU for HD monitoring.    Neuro: Chronic knee and neuropathic pain- Morphine 15-30 IR PO Q14 PRN, Flexeril 5mg PO Q8hrm Gabapentin 100mg PO TID, Morphine 2mg PO TID, Morphine 2mg Q4 prn breakthrough    CV: CHF (EF 15%), CAD s/p CABG 2018, HTN, HLD- Metoprolol ER 25mg, Digoxin, Spironolactone, Aspirin, Prasugrel, Echo 7/17 w/ EF 15%    Pulm:On NC  GI/FEN: CLD carb consistent, Protoxin   : Loja  ID: MRSA bacteremia previously on Vanc and Cefepime, VRE and Pseudomonus in Per Caitlyn site on Dapto (9/6-  ) Cefepime ( 9/6- ) Rifampin (9/1- )  Endo: Diabetes, ISS  Heme: Hgd stable s/p 3 units PRBC post op  PPX: SCDs  Lines: R PICC,  R IV, L radial A line  Wounds: RLE  antibiotic spacer  and Gastroc flap w/ hemovac to deep wound, MIS to superficial, wound vac, LLE thigh graft site, shin skin flap w/ wound vac   PT/OT: Ordered

## 2020-09-22 NOTE — CHART NOTE - NSCHARTNOTEFT_GEN_A_CORE
Admitting Diagnosis:   Patient is a 65y old  Male who presents with a chief complaint of R Knee Swelling (22 Sep 2020 14:52)    PAST MEDICAL & SURGICAL HISTORY:  Diabetic neuropathy  STEMI (ST elevation myocardial infarction)  Diverticulitis  MRSA bacteremia  History of celiac disease  CHF (congestive heart failure)  EF ~ 25%  HTN (hypertension)  Diabetes  on insulin pump  Blood clot due to device, implant, or graft  was on blood thinners  HLD (hyperlipidemia)  Osteoarthritis  Atherosclerosis of coronary artery  CAD (coronary artery disease)  Status post percutaneous transluminal coronary angioplasty  in 2012  History of open reduction and internal fixation (ORIF) procedure  Surgery, elective  Right shoulder  Surgery, elective  right knee wound debridement  S/P TKR (total knee replacement), right  with infection Mrsa   per pt he was cleared from MRSA infection  S/P CABG x 1 2018  Stented coronary artery  10/18 heart attack  INFERIOR WALL MI  Other postprocedural status  Fixation hardware in foot LEFT    Current Nutrition Order: CST CHO diet with evening snack and 1500 ml fluid restriction and Glucerna Shakes TID (660 kcal, 30g protein, 600mL H2O)     PO Intake: Good (%) [   ]  Fair (50-75%) [   ] Poor (<25%) [   ]    GI Issues:     Pain:    Skin Integrity:    Labs:   09-22    134<L>  |  105  |  19  ----------------------------<  135<H>  4.1   |  20<L>  |  0.89    Ca    8.2<L>      22 Sep 2020 06:54  Phos  2.4     09-22  Mg     1.8     09-22    TPro  5.7<L>  /  Alb  2.1<L>  /  TBili  0.6  /  DBili  x   /  AST  27  /  ALT  23  /  AlkPhos  112  09-22    CAPILLARY BLOOD GLUCOSE    POCT Blood Glucose.: 141 mg/dL (22 Sep 2020 11:49)  POCT Blood Glucose.: 134 mg/dL (22 Sep 2020 07:26)  POCT Blood Glucose.: 125 mg/dL (21 Sep 2020 22:28)  POCT Blood Glucose.: 170 mg/dL (21 Sep 2020 17:42)    Medications:  MEDICATIONS  (STANDING):  aspirin enteric coated 81 milliGRAM(s) Oral two times a day  cefepime   IVPB 2000 milliGRAM(s) IV Intermittent every 8 hours  chlorhexidine 2% Cloths 1 Application(s) Topical <User Schedule>  DAPTOmycin IVPB 650 milliGRAM(s) IV Intermittent every 24 hours  dextrose 5%. 1000 milliLiter(s) (50 mL/Hr) IV Continuous <Continuous>  dextrose 50% Injectable 12.5 Gram(s) IV Push once  dextrose 50% Injectable 25 Gram(s) IV Push once  dextrose 50% Injectable 25 Gram(s) IV Push once  digoxin     Tablet 0.125 milliGRAM(s) Oral daily  gabapentin 300 milliGRAM(s) Oral three times a day  insulin glargine Injectable (LANTUS) 20 Unit(s) SubCutaneous at bedtime  insulin lispro (HumaLOG) corrective regimen sliding scale   SubCutaneous Before meals and at bedtime  insulin lispro Injectable (HumaLOG) 8 Unit(s) SubCutaneous three times a day before meals  ketorolac   Injectable 15 milliGRAM(s) IV Push every 8 hours  metoprolol succinate ER 25 milliGRAM(s) Oral daily  nystatin Cream 1 Application(s) Topical two times a day  polyethylene glycol 3350 17 Gram(s) Oral daily  prasugrel 10 milliGRAM(s) Oral daily  rifAMPin 300 milliGRAM(s) Oral every 12 hours  rosuvastatin 10 milliGRAM(s) Oral at bedtime  sertraline 25 milliGRAM(s) Oral daily  spironolactone 25 milliGRAM(s) Oral daily  tamsulosin 0.4 milliGRAM(s) Oral at bedtime    MEDICATIONS  (PRN):  cyclobenzaprine 10 milliGRAM(s) Oral three times a day PRN Muscle Spasm  dextrose 40% Gel 15 Gram(s) Oral once PRN Blood Glucose LESS THAN 70 milliGRAM(s)/deciliter  diphenhydrAMINE 50 milliGRAM(s) Oral every 4 hours PRN Rash and/or Itching  glucagon  Injectable 1 milliGRAM(s) IntraMuscular once PRN Glucose LESS THAN 70 milligrams/deciliter  morphine  - Injectable 4 milliGRAM(s) IV Push every 4 hours PRN breakthrough pain  morphine  IR 15 milliGRAM(s) Oral every 4 hours PRN Moderate Pain (4 - 6)  morphine  IR 30 milliGRAM(s) Oral every 4 hours PRN Severe Pain (7 - 10)  naloxone Injectable 0.1 milliGRAM(s) IV Push every 3 minutes PRN For ANY of the following changes in patient status:  A. RR LESS THAN 10 breaths per minute, B. Oxygen saturation LESS THAN 90%, C. Sedation score of 6  ondansetron Injectable 4 milliGRAM(s) IV Push every 6 hours PRN Nausea  senna 2 Tablet(s) Oral at bedtime PRN Constipation  sodium chloride 0.9% lock flush 10 milliLiter(s) IV Push every 1 hour PRN Pre/post blood products, medications, blood draw, and to maintain line patency    Admitted Anthropometrics:  Height: 74" Weight: 185lbs, IBW 190lbs+/-10%, %IBW 97%, BMI 23.7 kg/m2    Weight:  July 2020 183lbs (previous admission)  9/1 185lbs  9/15 210lbs    Weight Change: Pt with sudden weight gain over the last 2 weeks (+25lbs), please reweigh to confirm weight changes and then trend biweekly.     Nutrition Focused Physical Exam: Completed [   ]  Unable to complete [ x  ]    Estimated energy needs:   ABW (84kg) used for calculations as pt between % of IBW. Needs adjusted for wound healing.   Kcal (25-30 kcal/kg): 4216-1404 kcal  Protein (1.2-1.4 g/kg pro): 100-118 g pro  Fluids (25-30 ml/kg): 1648-5977 ml    Subjective:   66 yo M with HTN, hyperlipidemia, type II DM (A1C 8.9% 7/20) with peripheral neuropathy, CAD s/p MIDCAB (2018)/VERONICA, HFrEF, AICD (2/20), pulmonary HTN, chronic cholecystitis with recurrent R knee PPJI - MRSA, likely persistent from 11/2019.  He has completed 6 weeks of vancomycin & rifampin.  Inflammatory markers remained very elevated as outpatient, inpatient values downtrending and improved from discharge last month.  His knee was aspirated by Dr. Castro on 9/2/2020. Pt with other potential sources for infection/inflammation and is undergoing evaluation by Surgery regarding reported RUQ persistent drainage and right knee infection- started on abx course (now on maxipime, daptomycin, and rifampin). Pt now s/p insertion of abx spacer 9/18. Plan for bed rest x7 days with plan for d/c to rehab vs home per disc with team during rounds today. Consult received for assessment/ education. On assessment, pt is resting in bed with support person at bedside. Currently on CST CHO diet with Glucerna Shakes TID (660 kcal, 30g protein, 600mL H2O) and 1500 ml Fluid restriction. Per disc with team, MD wishing to increase albumin for concern of poor nutrition status. Albumin is a poor indicator of nutrition as it is effected by many other factors, and per ASPEN, is not an indicator for malnutrition. Pt is currently consuming >50% of meals and focusing on high protein foods. Pt also consuming atleast x2 Glucerna shakes daily- cont to encourage adequate PO intake. Reenforced CST CHO diet with wound healing guidelines- pt receptive and well educated on diets. Please see nutrition recs below. RD to follow up per protocol.     Nutrition Diagnosis: Increased pro needs RT increased nutrient demand 2/2 wound healing AEB multiple wounds noted    [  ] No active nutrition diagnosis at this time  [  ] Current medical condition precludes nutrition intervention    Goal: Consistently meet >75% est needs    Recommendations:  1. CST CHO diet with Glucerna Shakes TID (660 kcal, 30g protein, 600mL H2O) and 1500 ml fluid restriction  >>Recommend d/c fluid restriction at this time.   2. Pain and bowel regime per team  3. Tight BS control  4. RD diet education reenforcement prn  5. Recommend reweigh and then trend weights biweekly.     Education: Reenforced CST CHO diet education- pt receptive but suspect pt needs constant reenforcement.    Risk Level: High [  ] Moderate [ x  ] Low [   ]. Admitting Diagnosis:   Patient is a 65y old  Male who presents with a chief complaint of R Knee Swelling (22 Sep 2020 14:52)    PAST MEDICAL & SURGICAL HISTORY:  Diabetic neuropathy  STEMI (ST elevation myocardial infarction)  Diverticulitis  MRSA bacteremia  History of celiac disease  CHF (congestive heart failure)  EF ~ 25%  HTN (hypertension)  Diabetes  on insulin pump  Blood clot due to device, implant, or graft  was on blood thinners  HLD (hyperlipidemia)  Osteoarthritis  Atherosclerosis of coronary artery  CAD (coronary artery disease)  Status post percutaneous transluminal coronary angioplasty  in 2012  History of open reduction and internal fixation (ORIF) procedure  Surgery, elective  Right shoulder  Surgery, elective  right knee wound debridement  S/P TKR (total knee replacement), right  with infection Mrsa   per pt he was cleared from MRSA infection  S/P CABG x 1 2018  Stented coronary artery  10/18 heart attack  INFERIOR WALL MI  Other postprocedural status  Fixation hardware in foot LEFT    Current Nutrition Order: CST CHO diet with evening snack and 1500 ml fluid restriction and Glucerna Shakes TID (660 kcal, 30g protein, 600mL H2O)     PO Intake: Good (%) [   ]  Fair (50-75%) [x   ] Poor (<25%) [   ]    GI Issues:   WDL  NO n/v/d/c noted    Pain:  No pain present at time of assessment    Skin Integrity:  Surgical incision    Labs:   09-22    134<L>  |  105  |  19  ----------------------------<  135<H>  4.1   |  20<L>  |  0.89    Ca    8.2<L>      22 Sep 2020 06:54  Phos  2.4     09-22  Mg     1.8     09-22    TPro  5.7<L>  /  Alb  2.1<L>  /  TBili  0.6  /  DBili  x   /  AST  27  /  ALT  23  /  AlkPhos  112  09-22    CAPILLARY BLOOD GLUCOSE    POCT Blood Glucose.: 141 mg/dL (22 Sep 2020 11:49)  POCT Blood Glucose.: 134 mg/dL (22 Sep 2020 07:26)  POCT Blood Glucose.: 125 mg/dL (21 Sep 2020 22:28)  POCT Blood Glucose.: 170 mg/dL (21 Sep 2020 17:42)    Medications:  MEDICATIONS  (STANDING):  aspirin enteric coated 81 milliGRAM(s) Oral two times a day  cefepime   IVPB 2000 milliGRAM(s) IV Intermittent every 8 hours  chlorhexidine 2% Cloths 1 Application(s) Topical <User Schedule>  DAPTOmycin IVPB 650 milliGRAM(s) IV Intermittent every 24 hours  dextrose 5%. 1000 milliLiter(s) (50 mL/Hr) IV Continuous <Continuous>  dextrose 50% Injectable 12.5 Gram(s) IV Push once  dextrose 50% Injectable 25 Gram(s) IV Push once  dextrose 50% Injectable 25 Gram(s) IV Push once  digoxin     Tablet 0.125 milliGRAM(s) Oral daily  gabapentin 300 milliGRAM(s) Oral three times a day  insulin glargine Injectable (LANTUS) 20 Unit(s) SubCutaneous at bedtime  insulin lispro (HumaLOG) corrective regimen sliding scale   SubCutaneous Before meals and at bedtime  insulin lispro Injectable (HumaLOG) 8 Unit(s) SubCutaneous three times a day before meals  ketorolac   Injectable 15 milliGRAM(s) IV Push every 8 hours  metoprolol succinate ER 25 milliGRAM(s) Oral daily  nystatin Cream 1 Application(s) Topical two times a day  polyethylene glycol 3350 17 Gram(s) Oral daily  prasugrel 10 milliGRAM(s) Oral daily  rifAMPin 300 milliGRAM(s) Oral every 12 hours  rosuvastatin 10 milliGRAM(s) Oral at bedtime  sertraline 25 milliGRAM(s) Oral daily  spironolactone 25 milliGRAM(s) Oral daily  tamsulosin 0.4 milliGRAM(s) Oral at bedtime    MEDICATIONS  (PRN):  cyclobenzaprine 10 milliGRAM(s) Oral three times a day PRN Muscle Spasm  dextrose 40% Gel 15 Gram(s) Oral once PRN Blood Glucose LESS THAN 70 milliGRAM(s)/deciliter  diphenhydrAMINE 50 milliGRAM(s) Oral every 4 hours PRN Rash and/or Itching  glucagon  Injectable 1 milliGRAM(s) IntraMuscular once PRN Glucose LESS THAN 70 milligrams/deciliter  morphine  - Injectable 4 milliGRAM(s) IV Push every 4 hours PRN breakthrough pain  morphine  IR 15 milliGRAM(s) Oral every 4 hours PRN Moderate Pain (4 - 6)  morphine  IR 30 milliGRAM(s) Oral every 4 hours PRN Severe Pain (7 - 10)  naloxone Injectable 0.1 milliGRAM(s) IV Push every 3 minutes PRN For ANY of the following changes in patient status:  A. RR LESS THAN 10 breaths per minute, B. Oxygen saturation LESS THAN 90%, C. Sedation score of 6  ondansetron Injectable 4 milliGRAM(s) IV Push every 6 hours PRN Nausea  senna 2 Tablet(s) Oral at bedtime PRN Constipation  sodium chloride 0.9% lock flush 10 milliLiter(s) IV Push every 1 hour PRN Pre/post blood products, medications, blood draw, and to maintain line patency    Admitted Anthropometrics:  Height: 74" Weight: 185lbs, IBW 190lbs+/-10%, %IBW 97%, BMI 23.7 kg/m2    Weight:  July 2020 183lbs (previous admission)  9/1 185lbs  9/15 210lbs    Weight Change: Pt with sudden weight gain over the last 2 weeks (+25lbs), please reweigh to confirm weight changes and then trend biweekly.     Nutrition Focused Physical Exam: Completed [   ]  Unable to complete [ x  ]    Estimated energy needs:   ABW (84kg) used for calculations as pt between % of IBW. Needs adjusted for wound healing.   Kcal (25-30 kcal/kg): 0832-0005 kcal  Protein (1.2-1.4 g/kg pro): 100-118 g pro  Fluids (25-30 ml/kg): 1781-4073 ml    Subjective:   66 yo M with HTN, hyperlipidemia, type II DM (A1C 8.9% 7/20) with peripheral neuropathy, CAD s/p MIDCAB (2018)/VERONICA, HFrEF, AICD (2/20), pulmonary HTN, chronic cholecystitis with recurrent R knee PPJI - MRSA, likely persistent from 11/2019.  He has completed 6 weeks of vancomycin & rifampin.  Inflammatory markers remained very elevated as outpatient, inpatient values downtrending and improved from discharge last month.  His knee was aspirated by Dr. Castro on 9/2/2020. Pt with other potential sources for infection/inflammation and is undergoing evaluation by Surgery regarding reported RUQ persistent drainage and right knee infection- started on abx course (now on maxipime, daptomycin, and rifampin). Pt now s/p insertion of abx spacer 9/18. Plan for bed rest x7 days with plan for d/c to rehab vs home per disc with team during rounds today. Consult received for assessment/ education. On assessment, pt is resting in bed with support person at bedside. Currently on CST CHO diet with Glucerna Shakes TID (660 kcal, 30g protein, 600mL H2O) and 1500 ml Fluid restriction. Per disc with team, MD wishing to increase albumin for concern of poor nutrition status. Albumin is a poor indicator of nutrition as it is effected by many other factors, and per ASPEN, is not an indicator for malnutrition. Pt is currently consuming >50% of meals and focusing on high protein foods. Pt also consuming atleast x2 Glucerna shakes daily- cont to encourage adequate PO intake. Reenforced CST CHO diet with wound healing guidelines- pt receptive and well educated on diets. Please see nutrition recs below. RD to follow up per protocol.     Nutrition Diagnosis: Increased pro needs RT increased nutrient demand 2/2 wound healing AEB multiple wounds noted    [  ] No active nutrition diagnosis at this time  [  ] Current medical condition precludes nutrition intervention    Goal: Consistently meet >75% est needs    Recommendations:  1. CST CHO diet with Glucerna Shakes TID (660 kcal, 30g protein, 600mL H2O) and 1500 ml fluid restriction  >>Recommend d/c fluid restriction at this time.   2. Pain and bowel regime per team  3. Tight BS control  4. RD diet education reenforcement prn  5. Recommend reweigh and then trend weights biweekly.     Education: Reenforced CST CHO diet education- pt receptive but suspect pt needs constant reenforcement.    Risk Level: High [  ] Moderate [ x  ] Low [   ].

## 2020-09-22 NOTE — PROGRESS NOTE ADULT - SUBJECTIVE AND OBJECTIVE BOX
Subjective:  Patient is a 65y old  Male who presents with a chief complaint of R Knee Swelling (21 Sep 2020 17:41)   Pt pain is controlled, vac running with no leaks.    Objective:  T(C): 36.4 (09-22-20 @ 05:29), Max: 37.4 (09-21-20 @ 13:21)  HR: 80 (09-22-20 @ 05:29) (76 - 87)  BP: 99/62 (09-22-20 @ 05:29) (93/50 - 109/61)  RR: 16 (09-22-20 @ 05:29) (15 - 18)  SpO2: 100% (09-22-20 @ 05:29) (96% - 100%)  Wt(kg): --   09-21    131<L>  |  102  |  23  ----------------------------<  309<H>  4.1   |  20<L>  |  0.89    Ca    7.8<L>      21 Sep 2020 06:21  Phos  1.9     09-21  Mg     1.7     09-21    TPro  5.6<L>  /  Alb  2.3<L>  /  TBili  0.5  /  DBili  x   /  AST  23  /  ALT  23  /  AlkPhos  111  09-21                        8.2    12.60 )-----------( 228      ( 21 Sep 2020 06:21 )             27.6       09-20 @ 07:01  -  09-21 @ 07:00  --------------------------------------------------------  IN: 960 mL / OUT: 1910 mL / NET: -950 mL    09-21 @ 07:01  -  09-22 @ 06:56  --------------------------------------------------------  IN: 0 mL / OUT: 1200 mL / NET: -1200 mL      PHYSICAL EXAM:  General: NAD, Lying in bed comfortably  Extrem: RLE wound vac in place holding suction.  HMV/MIS SS output.  Left shin VAc in place holding suction.          MEDICATIONS  (STANDING):  aspirin enteric coated 81 milliGRAM(s) Oral two times a day  cefepime   IVPB 2000 milliGRAM(s) IV Intermittent every 8 hours  chlorhexidine 2% Cloths 1 Application(s) Topical <User Schedule>  DAPTOmycin IVPB 650 milliGRAM(s) IV Intermittent every 24 hours  dextrose 5%. 1000 milliLiter(s) (50 mL/Hr) IV Continuous <Continuous>  dextrose 50% Injectable 12.5 Gram(s) IV Push once  dextrose 50% Injectable 25 Gram(s) IV Push once  dextrose 50% Injectable 25 Gram(s) IV Push once  digoxin     Tablet 0.125 milliGRAM(s) Oral daily  gabapentin 100 milliGRAM(s) Oral <User Schedule>  gabapentin 200 milliGRAM(s) Oral at bedtime  insulin glargine Injectable (LANTUS) 20 Unit(s) SubCutaneous at bedtime  insulin lispro (HumaLOG) corrective regimen sliding scale   SubCutaneous Before meals and at bedtime  insulin lispro Injectable (HumaLOG) 8 Unit(s) SubCutaneous three times a day before meals  metoprolol succinate ER 25 milliGRAM(s) Oral daily  nystatin Cream 1 Application(s) Topical two times a day  polyethylene glycol 3350 17 Gram(s) Oral daily  prasugrel 10 milliGRAM(s) Oral daily  rifAMPin 300 milliGRAM(s) Oral every 12 hours  rosuvastatin 10 milliGRAM(s) Oral at bedtime  sertraline 25 milliGRAM(s) Oral daily  spironolactone 25 milliGRAM(s) Oral daily  tamsulosin 0.4 milliGRAM(s) Oral at bedtime    MEDICATIONS  (PRN):  cyclobenzaprine 10 milliGRAM(s) Oral three times a day PRN Muscle Spasm  dextrose 40% Gel 15 Gram(s) Oral once PRN Blood Glucose LESS THAN 70 milliGRAM(s)/deciliter  diphenhydrAMINE 50 milliGRAM(s) Oral every 4 hours PRN Rash and/or Itching  glucagon  Injectable 1 milliGRAM(s) IntraMuscular once PRN Glucose LESS THAN 70 milligrams/deciliter  morphine  - Injectable 2 milliGRAM(s) IV Push every 4 hours PRN Breakthrough  morphine  IR 30 milliGRAM(s) Oral every 4 hours PRN Severe Pain (7 - 10)  morphine  IR 15 milliGRAM(s) Oral every 4 hours PRN Moderate Pain (4 - 6)  naloxone Injectable 0.1 milliGRAM(s) IV Push every 3 minutes PRN For ANY of the following changes in patient status:  A. RR LESS THAN 10 breaths per minute, B. Oxygen saturation LESS THAN 90%, C. Sedation score of 6  ondansetron Injectable 4 milliGRAM(s) IV Push every 6 hours PRN Nausea  senna 2 Tablet(s) Oral at bedtime PRN Constipation  sodium chloride 0.9% lock flush 10 milliLiter(s) IV Push every 1 hour PRN Pre/post blood products, medications, blood draw, and to maintain line patency

## 2020-09-22 NOTE — CONSULT NOTE ADULT - PROVIDER SPECIALTY LIST ADULT
Plastic Surgery
Cardiology
Intervent Radiology
Pain Medicine
SICU
Surgery
Infectious Disease
Podiatry
Surgery
Vascular Surgery
Internal Medicine

## 2020-09-22 NOTE — PROGRESS NOTE ADULT - SUBJECTIVE AND OBJECTIVE BOX
INTERVAL HISTORY:  More alert, No dyspnea	  Chart, [facility] medications and allergies reviewed    MEDICATIONS:  MEDICATIONS  (STANDING):  aspirin enteric coated 81 milliGRAM(s) Oral two times a day  cefepime   IVPB 2000 milliGRAM(s) IV Intermittent every 8 hours  chlorhexidine 2% Cloths 1 Application(s) Topical <User Schedule>  DAPTOmycin IVPB 650 milliGRAM(s) IV Intermittent every 24 hours  dextrose 5%. 1000 milliLiter(s) (50 mL/Hr) IV Continuous <Continuous>  dextrose 50% Injectable 12.5 Gram(s) IV Push once  dextrose 50% Injectable 25 Gram(s) IV Push once  dextrose 50% Injectable 25 Gram(s) IV Push once  digoxin     Tablet 0.125 milliGRAM(s) Oral daily  gabapentin 300 milliGRAM(s) Oral three times a day  insulin glargine Injectable (LANTUS) 20 Unit(s) SubCutaneous at bedtime  insulin lispro (HumaLOG) corrective regimen sliding scale   SubCutaneous Before meals and at bedtime  insulin lispro Injectable (HumaLOG) 8 Unit(s) SubCutaneous three times a day before meals  ketorolac   Injectable 15 milliGRAM(s) IV Push every 8 hours  metoprolol succinate ER 25 milliGRAM(s) Oral daily  nystatin Cream 1 Application(s) Topical two times a day  polyethylene glycol 3350 17 Gram(s) Oral daily  prasugrel 10 milliGRAM(s) Oral daily  rifAMPin 300 milliGRAM(s) Oral every 12 hours  rosuvastatin 10 milliGRAM(s) Oral at bedtime  sertraline 25 milliGRAM(s) Oral daily  spironolactone 25 milliGRAM(s) Oral daily  tamsulosin 0.4 milliGRAM(s) Oral at bedtime      REVIEW OF SYSTEMS:  CONSTITUTIONAL: No fever, no weight loss, no fatigue  PULMONARY: No cough, no wheezing, chills no hemoptysis; No Shortness of Breath  CARDIOVASCULAR: No chest pain, no palpitations, no syncope, dizziness, no leg swelling  GASTROINTESTINAL: No abdominal pain. No nausea, no  vomiting, no hematemesis; No diarrhea, no constipation. No melena, no hematochezia.  GENITOURINARY: No dysuria, no frequency, no hematuria, no incontinence  SKIN: No itching, no burning, no rashes, no lesions     PHYSICAL EXAM:  T(C): 37.1 (09-22-20 @ 13:49), Max: 37.3 (09-21-20 @ 18:00)  HR: 90 (09-22-20 @ 13:49) (76 - 90)  BP: 112/61 (09-22-20 @ 13:49) (93/50 - 112/61)  RR: 15 (09-22-20 @ 13:49) (15 - 17)  SpO2: 97% (09-22-20 @ 13:49) (97% - 100%)  Wt(kg): --  I&O's Summary    21 Sep 2020 07:01  -  22 Sep 2020 07:00  --------------------------------------------------------  IN: 0 mL / OUT: 1200 mL / NET: -1200 mL    22 Sep 2020 07:01  -  22 Sep 2020 14:30  --------------------------------------------------------  IN: 50 mL / OUT: 490 mL / NET: -440 mL        Appearance:  No deformities, normal appearance	  HEENT:   Normal oral mucosa, PERRL, EOMI	  Neck: No jvd , no masses  Lymphatic: No  lymphadenopathy  Pulmonary: Lungs clear to auscultation and percussion  Cardiovascular: Normal S1 S2, No JVD, No murmur, R edema	  Abdomen:  Soft, Non-tender, + BS  Skin: No rashes, No ecchymoses	  Extremities:  No clubbing or cyanosis  MSK: Normal range of motion, no joint swelling    LABS:		  CARDIAC MARKERS:                         8.9    10.82 )-----------( 252      ( 22 Sep 2020 06:54 )             30.6   09-22    134<L>  |  105  |  19  ----------------------------<  135<H>  4.1   |  20<L>  |  0.89    Ca    8.2<L>      22 Sep 2020 06:54  Phos  2.4     09-22  Mg     1.8     09-22    TPro  5.7<L>  /  Alb  2.1<L>  /  TBili  0.6  /  DBili  x   /  AST  27  /  ALT  23  /  AlkPhos  112  09-22    proBNP:  Lipid Profile:  HgA1c:  TSH:      Culture - Acid Fast - Body Fluid w/Smear (collected 09-22-20 @ 00:56)  Source: .Body Fluid Abdominal Fluid    Culture - Fungal, Body Fluid (collected 09-22-20 @ 00:55)  Source: .Body Fluid Abdominal Fluid  Preliminary Report (09-22-20 @ 11:33):    Testing in progress    Culture - Body Fluid with Gram Stain (collected 09-21-20 @ 14:44)  Source: .Body Fluid Abdominal Fluid  Gram Stain (09-21-20 @ 17:15):    No organisms seen    No WBC's seen.  Preliminary Report (09-22-20 @ 09:09):    No growth to date    Culture - Surgical Swab (collected 09-18-20 @ 17:30)  Source: .Surgical Swab right femur intramedullary #2 or  Gram Stain (09-18-20 @ 21:38):    No organisms seen    Few WBC's  Preliminary Report (09-19-20 @ 09:12):    No growth to date.    Culture - Tissue with Gram Stain (collected 09-18-20 @ 17:30)  Source: .Tissue right femur intramedullary #1 or  Gram Stain (09-18-20 @ 21:35):    No organisms seen    Rare to few White blood cells  Preliminary Report (09-19-20 @ 09:14):    No growth to date.    Culture - Tissue with Gram Stain (collected 09-18-20 @ 17:30)  Source: .Tissue right knee synovium or spec  Gram Stain (09-18-20 @ 21:32):    No organisms seen    Rare WBC's  Preliminary Report (09-19-20 @ 09:15):    No growth to date.    Culture - Tissue with Gram Stain (collected 09-18-20 @ 17:29)  Source: .Tissue right femur metaphysis or spec  Gram Stain (09-18-20 @ 21:36):    No organisms seen    Rare WBC's  Preliminary Report (09-19-20 @ 09:15):    No growth to date.    Culture - Tissue with Gram Stain (collected 09-18-20 @ 17:29)  Source: .Tissue right tibia periostrum or spec  Gram Stain (09-18-20 @ 21:31):    No organisms seen    Rare WBC's  Preliminary Report (09-19-20 @ 09:16):    No growth to date.    Culture - Tissue with Gram Stain (collected 09-18-20 @ 17:28)  Source: .Tissue right tibia membrane or spec  Gram Stain (09-18-20 @ 21:37):    No organisms seen    Rare to few White blood cells  Preliminary Report (09-19-20 @ 09:11):    No growth to date.    Culture - Tissue with Gram Stain (collected 09-18-20 @ 17:28)  Source: .Tissue right femur membrane or spec  Gram Stain (09-18-20 @ 21:34):    No organisms seen    Few WBC's  Preliminary Report (09-19-20 @ 09:17):    No growth to date.    Culture - Urine (collected 09-17-20 @ 18:33)  Source: .Urine Clean Catch (Midstream)  Final Report (09-19-20 @ 10:45):    Less than 10,000 cols/cc; Insignificant amount of growth.      TELEMETRY: 7 beat VT and 7 beat SVT	      QTC     ECG:  	   QTC         RADIOLOGY:   DIAGNOSTIC TESTING: [ ] Echocardiogram: [ ]  Catheterization: [ ] Stress Test:      ASSESSMENT/PLAN: 	  Severely reduced ejection fraction-The patient is ambulating without dyspnea.  Continue metoprolol digoxin and spironolactone.    Coronary artery disease-the patient denies chest discomfort. His EKG is uninterpretable secondary to the pacemaker. He was revascularized less than two years ago. There is no clinical evidence for ischemia. On rosuvastatin. The patient is history of stent thrombosis. He had a stent placed less than two years ago. continue prasugrel and aspirin.     Ventiricular tachycardia/Defibrillator-7 beat episode. No rx needed.     Hyponatremia- on fluid restriction    Septic knee- as per ID and orthopedics.

## 2020-09-22 NOTE — CONSULT NOTE ADULT - CONSULT REQUESTED BY NAME
Dr. Castro
Dr. Jose Castro
Dr. Jose Castro
Ortho
Ortho
Vanessa Lombardi)
ortho
ortho
Jose Castro MD

## 2020-09-22 NOTE — PROGRESS NOTE ADULT - SUBJECTIVE AND OBJECTIVE BOX
Pain Management Progress Note - [facility] [phone]      HPI: Patient seen and examined today. Patient with a history of knee pain, diabetic neuropathy, CHF, HTN, MRSA bacteremia, diverticulitis, COPD, hyperkalemia, chronic systolic CHF, osteoarthritis, s/p R Revision TKA and antibiotic spacer by Dr. Castro on 7/22, now presenting with R knee pain and swelling x3 days, now s/p R knee spacer exchange, gastro flap with skin graft.  Patient reports R knee pain, LLE pain, patient reports moderate pain relief with  current pain medication regimen. Patient Axox3, denies any itchiness from Morphine Po. LLE and R knee dressing intact. x1 bulb drain, x1 hemovac drain present, bilateral feet wrapped with curlex. Woundvac to LLE>       Pain is _x__ sharp ____dull ___burning _x__achy ___ Intensity: ____ mild _x__mod x__severe     Location _x___surgical site ____cervical _____lumbar ____abd ____upper ext__x__lower ext    Worse with _x___activity _x___movement _____physical therapy___ Rest    Improved with _x___medication __x__rest ____physical therapy      potassium phosphate / sodium phosphate Powder (PHOS-NaK)  insulin glargine Injectable (LANTUS)  chlorhexidine 2% Cloths  sodium chloride 0.9% lock flush  gabapentin  gabapentin  sertraline  rosuvastatin  senna  polyethylene glycol 3350  nystatin Cream  potassium phosphate / sodium phosphate Powder (PHOS-NaK)  insulin glargine Injectable (LANTUS)  insulin lispro Injectable (HumaLOG)  cyclobenzaprine  aspirin enteric coated  diphenhydrAMINE  tamsulosin  HYDROmorphone  Injectable  digoxin     Tablet  HYDROmorphone PCA (1 mG/mL)  prasugrel  aspirin enteric coated  ondansetron Injectable  naloxone Injectable  HYDROmorphone PCA (1 mG/mL)  spironolactone  digoxin     Tablet  glucagon  Injectable  dextrose 50% Injectable  dextrose 50% Injectable  dextrose 50% Injectable  dextrose 40% Gel  dextrose 5%.  insulin lispro (HumaLOG) corrective regimen sliding scale  magnesium sulfate  IVPB  metoprolol succinate ER  cefepime   IVPB  rifAMPin  DAPTOmycin IVPB  morphine  - Injectable  gabapentin  cyclobenzaprine  morphine  IR  morphine  IR  lactated ringers.  HYDROmorphone  Injectable  BUpivacaine liposome 1.3% Injectable (no eMAR)  morphine  IR  morphine  IR  morphine  - Injectable  lactated ringers.  BACItracin   Ointment  (ADM OVERRIDE)  mupirocin 2% Ointment  nystatin Cream  morphine  IR  morphine  IR  nystatin Powder  morphine  - Injectable  chlorhexidine 2% Cloths  insulin glargine Injectable (LANTUS)  insulin glargine Injectable (LANTUS)  insulin glargine Injectable (LANTUS)  pantoprazole  Injectable  metoclopramide Injectable  acetaminophen   Tablet ..  diphenhydrAMINE   Injectable  lactated ringers.  levothyroxine  sodium chloride 0.9% Bolus  gabapentin  gabapentin  gabapentin  sertraline  insulin glargine Injectable (LANTUS)  morphine  IR  morphine  IR  morphine  - Injectable  HYDROmorphone   Tablet  HYDROmorphone   Tablet  insulin lispro Injectable (HumaLOG)  diphenhydrAMINE  insulin glargine Injectable (LANTUS)  diazepam    Tablet  cefepime   IVPB  cefepime   IVPB  cefepime   IVPB  DAPTOmycin IVPB  insulin lispro Injectable (HumaLOG)  insulin glargine Injectable (LANTUS)  insulin lispro (HumaLOG) corrective regimen sliding scale  HYDROmorphone  Injectable  rifAMPin  glucagon  Injectable  dextrose 50% Injectable  dextrose 50% Injectable  dextrose 50% Injectable  dextrose 40% Gel  dextrose 5%.  lidocaine 2% Injectable  lidocaine 2% Injectable  tamsulosin  rosuvastatin  gabapentin  cyclobenzaprine  spironolactone  atorvastatin  metoprolol succinate ER  prasugrel  digoxin     Tablet  vancomycin  IVPB  aspirin  chewable  HYDROmorphone  Injectable  senna  bisacodyl Suppository  polyethylene glycol 3350  ondansetron Injectable  aluminum hydroxide/magnesium hydroxide/simethicone Suspension  acetaminophen   Tablet ..  lactated ringers.  oxyCODONE    IR  oxyCODONE    IR  morphine  - Injectable  vancomycin  IVPB  vancomycin  IVPB  vancomycin  IVPB  oxyCODONE    IR  oxyCODONE    IR  rosuvastatin  lactated ringers.  chlorhexidine 2% Cloths  povidone iodine 5% Nasal Swab  spironolactone  diphenhydrAMINE  chlorhexidine 4% Liquid  sodium chloride 0.9% lock flush  cyclobenzaprine  gabapentin  HYDROmorphone  Injectable  HYDROmorphone   Tablet  HYDROmorphone   Tablet  HYDROmorphone  Injectable  glucagon  Injectable  dextrose 50% Injectable  dextrose 50% Injectable  dextrose 50% Injectable  dextrose 40% Gel  dextrose 5%.  insulin lispro (HumaLOG) corrective regimen sliding scale  tamsulosin  spironolactone  vancomycin  IVPB  pantoprazole    Tablet  silver sulfADIAZINE 1% Cream  metoprolol succinate ER  rifAMPin  prasugrel  atorvastatin  DULoxetine  tamsulosin Oral Tab/Cap - Peds  oxyCODONE    IR  oxyCODONE  ER Tablet  celecoxib  aspirin enteric coated  acetaminophen   Tablet ..  spironolactone Oral Tab/Cap - Peds  HYDROmorphone  Injectable      ROS: Const:  -___febrile   Eyes:___ENT:___CV: __-_chest pain  Resp: __-__sob  GI:_-__nausea __-_vomiting __ abd pain _x__npo ___clears __full diet __bm  :___ Musk: _x__pain _-__spasm  Skin:___ Neuro:  _-__bqpkmwag_-__jbobjywpp_-__ numbness _-__weakness __x_paresth  Psych:_-_anxiety  Endo:___ Heme:___Allergy:_________, _x__all others reviewed and negative      PAST MEDICAL & SURGICAL HISTORY:  Diabetic neuropathy  STEMI (ST elevation myocardial infarction)  Diverticulitis  MRSA bacteremia  History of celiac disease  CHF (congestive heart failure): EF ~ 25%  HTN (hypertension)  Diabetes: on insulin pump  Blood clot due to device, implant, or graft: was on blood thinners  HLD (hyperlipidemia)  Osteoarthritis  Atherosclerosis of coronary artery: CAD (coronary artery disease)  Status post percutaneous transluminal coronary angioplasty: in 2012  History of open reduction and internal fixation (ORIF) procedure  Surgery, elective: Right shoulder  Surgery, elective: right knee wound debridement  S/P TKR (total knee replacement), right: with infection Mrsa   per pt he was cleared from MRSA infection  S/P CABG x 1: 2018  Stented coronary artery: 10/18 heart attack  INFERIOR WALL MI  Other postprocedural status: Fixation hardware in foot LEFT      09-22 @ 06:5491 mL/min/1.73M2          Hemoglobin: 8.9 g/dL (09-22 @ 06:54)  Hemoglobin: 8.2 g/dL (09-21 @ 06:21)        T(C): 36.4 (09-22-20 @ 05:29), Max: 37.4 (09-21-20 @ 13:21)  HR: 80 (09-22-20 @ 05:29) (76 - 87)  BP: 99/62 (09-22-20 @ 05:29) (93/50 - 102/51)  RR: 16 (09-22-20 @ 05:29) (15 - 17)  SpO2: 100% (09-22-20 @ 05:29) (97% - 100%)  Wt(kg): --         PHYSICAL EXAM:  Gen Appearance: x___no acute distress _x__appropriate        Neuro: _x__SILT feet____ EOM Intact Psych: AAOX__3, x___mood/affect appropriate        Eyes: __x_conjunctiva WNL  __x__ Pupils equal and round        ENT: x___ears and nose atraumatic_x__ Hearing grossly intact        Neck: _x__trachea midline, no visible masses ___thyroid without palpable mass    Resp: _x__Nml WOB____No tactile fremitus ___clear to auscultation    Cardio: ___extremities free from edema __x__pedal pulses palpable    GI/Abdomen: __x_soft ___x__ Nontender___x___Nondistended_____HSM    Lymphatic: ___no palpable nodes in neck  ___no palpable nodes calves and feet    Skin/Wound: ___Incision, _x__Dressing c/d/i,   ____surrounding tissues soft,  ___drain/chest tube present____    Muscular: EHL _4__/5  Gastrocnemius_4__/5    ___absent clubbing/cyanosis          ASSESSMENT: This is a 65y old Male with a history of knee pain, diabetic neuropathy, CHF, HTN, MRSA bacteremia, diverticulitis, COPD, hyperkalemia, chronic systolic CHF, osteoarthritis,  s/p R Revision TKA and antibiotic spacer by Dr. Castro on 7/22, now presenting with R knee pain and swelling x3 days, now s/p R knee spacer exchange, gastro flap with skin graft.     Recommended Treatment PLAN:  1. Morphine IR 30mg Po Q4h prn moderate to severe pain  2. Flexeril 5mg PO Q8h prn muscle spasms  3. Gabapentin 300mg PO TID  4. Morphine 4mg IVP Q4h prn breakthrough pain  Plan discussed with Dr. Chavez

## 2020-09-23 LAB
ALBUMIN SERPL ELPH-MCNC: 2.1 G/DL — LOW (ref 3.3–5)
ALP SERPL-CCNC: 110 U/L — SIGNIFICANT CHANGE UP (ref 40–120)
ALT FLD-CCNC: 23 U/L — SIGNIFICANT CHANGE UP (ref 10–45)
ANION GAP SERPL CALC-SCNC: 8 MMOL/L — SIGNIFICANT CHANGE UP (ref 5–17)
AST SERPL-CCNC: 21 U/L — SIGNIFICANT CHANGE UP (ref 10–40)
BASOPHILS # BLD AUTO: 0.1 K/UL — SIGNIFICANT CHANGE UP (ref 0–0.2)
BASOPHILS NFR BLD AUTO: 1 % — SIGNIFICANT CHANGE UP (ref 0–2)
BILIRUB SERPL-MCNC: 0.5 MG/DL — SIGNIFICANT CHANGE UP (ref 0.2–1.2)
BUN SERPL-MCNC: 20 MG/DL — SIGNIFICANT CHANGE UP (ref 7–23)
CALCIUM SERPL-MCNC: 8.2 MG/DL — LOW (ref 8.4–10.5)
CHLORIDE SERPL-SCNC: 103 MMOL/L — SIGNIFICANT CHANGE UP (ref 96–108)
CO2 SERPL-SCNC: 22 MMOL/L — SIGNIFICANT CHANGE UP (ref 22–31)
CREAT SERPL-MCNC: 0.92 MG/DL — SIGNIFICANT CHANGE UP (ref 0.5–1.3)
CULTURE RESULTS: SIGNIFICANT CHANGE UP
EOSINOPHIL # BLD AUTO: 0.44 K/UL — SIGNIFICANT CHANGE UP (ref 0–0.5)
EOSINOPHIL NFR BLD AUTO: 4.2 % — SIGNIFICANT CHANGE UP (ref 0–6)
GLUCOSE BLDC GLUCOMTR-MCNC: 189 MG/DL — HIGH (ref 70–99)
GLUCOSE BLDC GLUCOMTR-MCNC: 192 MG/DL — HIGH (ref 70–99)
GLUCOSE BLDC GLUCOMTR-MCNC: 80 MG/DL — SIGNIFICANT CHANGE UP (ref 70–99)
GLUCOSE BLDC GLUCOMTR-MCNC: 93 MG/DL — SIGNIFICANT CHANGE UP (ref 70–99)
GLUCOSE SERPL-MCNC: 188 MG/DL — HIGH (ref 70–99)
HCT VFR BLD CALC: 28.9 % — LOW (ref 39–50)
HGB BLD-MCNC: 8.4 G/DL — LOW (ref 13–17)
IMM GRANULOCYTES NFR BLD AUTO: 0.4 % — SIGNIFICANT CHANGE UP (ref 0–1.5)
LYMPHOCYTES # BLD AUTO: 0.83 K/UL — LOW (ref 1–3.3)
LYMPHOCYTES # BLD AUTO: 8 % — LOW (ref 13–44)
MAGNESIUM SERPL-MCNC: 1.8 MG/DL — SIGNIFICANT CHANGE UP (ref 1.6–2.6)
MCHC RBC-ENTMCNC: 22 PG — LOW (ref 27–34)
MCHC RBC-ENTMCNC: 29.1 GM/DL — LOW (ref 32–36)
MCV RBC AUTO: 75.9 FL — LOW (ref 80–100)
MONOCYTES # BLD AUTO: 1.29 K/UL — HIGH (ref 0–0.9)
MONOCYTES NFR BLD AUTO: 12.4 % — SIGNIFICANT CHANGE UP (ref 2–14)
NEUTROPHILS # BLD AUTO: 7.67 K/UL — HIGH (ref 1.8–7.4)
NEUTROPHILS NFR BLD AUTO: 74 % — SIGNIFICANT CHANGE UP (ref 43–77)
NRBC # BLD: 0 /100 WBCS — SIGNIFICANT CHANGE UP (ref 0–0)
PHOSPHATE SERPL-MCNC: 2.8 MG/DL — SIGNIFICANT CHANGE UP (ref 2.5–4.5)
PLATELET # BLD AUTO: 278 K/UL — SIGNIFICANT CHANGE UP (ref 150–400)
POTASSIUM SERPL-MCNC: 3.8 MMOL/L — SIGNIFICANT CHANGE UP (ref 3.5–5.3)
POTASSIUM SERPL-SCNC: 3.8 MMOL/L — SIGNIFICANT CHANGE UP (ref 3.5–5.3)
PROT SERPL-MCNC: 5.5 G/DL — LOW (ref 6–8.3)
RBC # BLD: 3.81 M/UL — LOW (ref 4.2–5.8)
RBC # FLD: 24 % — HIGH (ref 10.3–14.5)
SODIUM SERPL-SCNC: 133 MMOL/L — LOW (ref 135–145)
SPECIMEN SOURCE: SIGNIFICANT CHANGE UP
WBC # BLD: 10.37 K/UL — SIGNIFICANT CHANGE UP (ref 3.8–10.5)
WBC # FLD AUTO: 10.37 K/UL — SIGNIFICANT CHANGE UP (ref 3.8–10.5)

## 2020-09-23 PROCEDURE — 99232 SBSQ HOSP IP/OBS MODERATE 35: CPT

## 2020-09-23 PROCEDURE — 93971 EXTREMITY STUDY: CPT | Mod: 26,RT

## 2020-09-23 PROCEDURE — 74177 CT ABD & PELVIS W/CONTRAST: CPT | Mod: 26

## 2020-09-23 PROCEDURE — 99232 SBSQ HOSP IP/OBS MODERATE 35: CPT | Mod: GC

## 2020-09-23 RX ORDER — KETOROLAC TROMETHAMINE 30 MG/ML
15 SYRINGE (ML) INJECTION ONCE
Refills: 0 | Status: DISCONTINUED | OUTPATIENT
Start: 2020-09-23 | End: 2020-09-23

## 2020-09-23 RX ORDER — MUPIROCIN 20 MG/G
1 OINTMENT TOPICAL ONCE
Refills: 0 | Status: COMPLETED | OUTPATIENT
Start: 2020-09-23 | End: 2020-09-23

## 2020-09-23 RX ADMIN — CEFEPIME 100 MILLIGRAM(S): 1 INJECTION, POWDER, FOR SOLUTION INTRAMUSCULAR; INTRAVENOUS at 00:46

## 2020-09-23 RX ADMIN — DAPTOMYCIN 126 MILLIGRAM(S): 500 INJECTION, POWDER, LYOPHILIZED, FOR SOLUTION INTRAVENOUS at 01:31

## 2020-09-23 RX ADMIN — MORPHINE SULFATE 30 MILLIGRAM(S): 50 CAPSULE, EXTENDED RELEASE ORAL at 00:46

## 2020-09-23 RX ADMIN — GABAPENTIN 300 MILLIGRAM(S): 400 CAPSULE ORAL at 06:09

## 2020-09-23 RX ADMIN — Medication 2: at 08:31

## 2020-09-23 RX ADMIN — MORPHINE SULFATE 30 MILLIGRAM(S): 50 CAPSULE, EXTENDED RELEASE ORAL at 13:20

## 2020-09-23 RX ADMIN — SERTRALINE 25 MILLIGRAM(S): 25 TABLET, FILM COATED ORAL at 12:26

## 2020-09-23 RX ADMIN — MORPHINE SULFATE 30 MILLIGRAM(S): 50 CAPSULE, EXTENDED RELEASE ORAL at 01:20

## 2020-09-23 RX ADMIN — Medication 25 MILLIGRAM(S): at 06:10

## 2020-09-23 RX ADMIN — MORPHINE SULFATE 30 MILLIGRAM(S): 50 CAPSULE, EXTENDED RELEASE ORAL at 18:16

## 2020-09-23 RX ADMIN — CHLORHEXIDINE GLUCONATE 1 APPLICATION(S): 213 SOLUTION TOPICAL at 07:12

## 2020-09-23 RX ADMIN — ONDANSETRON 4 MILLIGRAM(S): 8 TABLET, FILM COATED ORAL at 17:19

## 2020-09-23 RX ADMIN — Medication 2: at 12:25

## 2020-09-23 RX ADMIN — GABAPENTIN 300 MILLIGRAM(S): 400 CAPSULE ORAL at 21:59

## 2020-09-23 RX ADMIN — Medication 81 MILLIGRAM(S): at 06:09

## 2020-09-23 RX ADMIN — MORPHINE SULFATE 4 MILLIGRAM(S): 50 CAPSULE, EXTENDED RELEASE ORAL at 19:54

## 2020-09-23 RX ADMIN — MUPIROCIN 1 APPLICATION(S): 20 OINTMENT TOPICAL at 18:24

## 2020-09-23 RX ADMIN — ROSUVASTATIN CALCIUM 10 MILLIGRAM(S): 5 TABLET ORAL at 21:59

## 2020-09-23 RX ADMIN — GABAPENTIN 300 MILLIGRAM(S): 400 CAPSULE ORAL at 13:23

## 2020-09-23 RX ADMIN — MORPHINE SULFATE 30 MILLIGRAM(S): 50 CAPSULE, EXTENDED RELEASE ORAL at 07:31

## 2020-09-23 RX ADMIN — MORPHINE SULFATE 30 MILLIGRAM(S): 50 CAPSULE, EXTENDED RELEASE ORAL at 22:18

## 2020-09-23 RX ADMIN — Medication 0.12 MILLIGRAM(S): at 06:10

## 2020-09-23 RX ADMIN — Medication 8 UNIT(S): at 12:25

## 2020-09-23 RX ADMIN — CEFEPIME 100 MILLIGRAM(S): 1 INJECTION, POWDER, FOR SOLUTION INTRAMUSCULAR; INTRAVENOUS at 09:11

## 2020-09-23 RX ADMIN — MORPHINE SULFATE 30 MILLIGRAM(S): 50 CAPSULE, EXTENDED RELEASE ORAL at 23:15

## 2020-09-23 RX ADMIN — Medication 15 MILLIGRAM(S): at 07:29

## 2020-09-23 RX ADMIN — Medication 81 MILLIGRAM(S): at 18:14

## 2020-09-23 RX ADMIN — Medication 8 UNIT(S): at 08:31

## 2020-09-23 RX ADMIN — PRASUGREL 10 MILLIGRAM(S): 5 TABLET, FILM COATED ORAL at 12:27

## 2020-09-23 RX ADMIN — NYSTATIN CREAM 1 APPLICATION(S): 100000 CREAM TOPICAL at 18:20

## 2020-09-23 RX ADMIN — MORPHINE SULFATE 4 MILLIGRAM(S): 50 CAPSULE, EXTENDED RELEASE ORAL at 06:09

## 2020-09-23 RX ADMIN — CEFEPIME 100 MILLIGRAM(S): 1 INJECTION, POWDER, FOR SOLUTION INTRAMUSCULAR; INTRAVENOUS at 16:58

## 2020-09-23 RX ADMIN — Medication 15 MILLIGRAM(S): at 07:01

## 2020-09-23 RX ADMIN — MORPHINE SULFATE 30 MILLIGRAM(S): 50 CAPSULE, EXTENDED RELEASE ORAL at 07:11

## 2020-09-23 RX ADMIN — SPIRONOLACTONE 25 MILLIGRAM(S): 25 TABLET, FILM COATED ORAL at 06:10

## 2020-09-23 RX ADMIN — MORPHINE SULFATE 4 MILLIGRAM(S): 50 CAPSULE, EXTENDED RELEASE ORAL at 19:27

## 2020-09-23 RX ADMIN — MORPHINE SULFATE 30 MILLIGRAM(S): 50 CAPSULE, EXTENDED RELEASE ORAL at 12:27

## 2020-09-23 RX ADMIN — MORPHINE SULFATE 30 MILLIGRAM(S): 50 CAPSULE, EXTENDED RELEASE ORAL at 19:25

## 2020-09-23 RX ADMIN — MORPHINE SULFATE 4 MILLIGRAM(S): 50 CAPSULE, EXTENDED RELEASE ORAL at 06:21

## 2020-09-23 NOTE — PROGRESS NOTE ADULT - ATTENDING COMMENTS
I have reviewed the medical record, including laboratory and radiographic studies, interviewed and examined the patient and discussed the plan with Dr. Pink, the ID Resident.  Agree with above. Will continue to follow with you – ID Team 1.

## 2020-09-23 NOTE — REVIEW OF SYSTEMS
Please cancel 10/6/20 colonoscopy  Patient will contact surgeon regarding recommendation for rescheduling colonoscopy s/p hernia repair  He will call back to schedule according to recommendation  [As Noted in HPI] : as noted in HPI [Fever] : no fever [Chills] : no chills [Eye Pain] : no eye pain [Eyesight Problems] : no eyesight problems [Nasal Discharge] : no nasal discharge [Sore Throat] : no sore throat [Chest Pain] : no chest pain [Palpitations] : no palpitations [Shortness Of Breath] : no shortness of breath [Cough] : no cough [Dysuria] : no dysuria [Confused] : no confusion [Easy Bleeding] : no tendency for easy bleeding [Easy Bruising] : no tendency for easy bruising [Swollen Glands] : no swollen glands

## 2020-09-23 NOTE — PROGRESS NOTE ADULT - ASSESSMENT
Patient is a 65y old  Male who presents with a chief complaint of R Knee Swelling scheduled for a R knee abx spacer exchange and gastroc flap procedure s/p pyogenic arthritis this Friday. He has requested a podiatry consult for diabetic foot ulcers on the right foot. Right foot found to have Rose grade 1 ulcers on 2nd and 3rd digits.    Plan:  Ordered Bactroban for ulcer site   Dressed ulcer sites of both feet in DSD   Plan discussed with Dr. Davalos  Podiatry will continue to monitor

## 2020-09-23 NOTE — PROGRESS NOTE ADULT - SUBJECTIVE AND OBJECTIVE BOX
Ortho Note    Pt comfortable without complaints, pain controlled  Denies CP, SOB, N/V, numbness/tingling     Vital Signs Last 24 Hrs  T(C): 36.2 (09-23-20 @ 09:18), Max: 37 (09-23-20 @ 06:30)  T(F): 97.2 (09-23-20 @ 09:18), Max: 98.6 (09-23-20 @ 06:30)  HR: 81 (09-23-20 @ 09:18) (81 - 87)  BP: 109/66 (09-23-20 @ 12:10) (92/50 - 116/68)  BP(mean): --  RR: 16 (09-23-20 @ 12:10) (14 - 16)  SpO2: 96% (09-23-20 @ 09:18) (96% - 97%)  AVSS      RLE:  DSG: R knee Wound vac holding suction at 125, IMS x1, HV x1, in KI, proximal donor site covered in tegaderm; kerlix to R foot debridement site (podiatry dsg)  Pulses: +DP;  feet cool to touch; at baseline; no edema  Sensation: Sensation intact to baseline (diminished sensation- h/o severe neuropathy; follows with vascular Dr. Malloy outpatient)  Motor: 5/5 EHL/FHL; limiting dorsi and plantar flexion on R as per plastics    LLE:  DSG: Wound vac holding suction at 125; tegaderm to proximal donor site CDI; kerlix to L foot debridement site CDI  Pulses: feet cool to touch; at baseline; no edema  Sensation: SILT to baseline (diminished sensation- h/o severe neuropathy; follows with vascular Dr. Malloy outpatient)  Motor: 5/5 EHL/FHL/TA/GS                        8.4    10.37 )-----------( 278      ( 23 Sep 2020 08:28 )             28.9   23 Sep 2020 08:28    133    |  103    |  20     ----------------------------<  188    3.8     |  22     |  0.92     Ca    8.2        23 Sep 2020 08:28  Phos  2.8       23 Sep 2020 08:28  Mg     1.8       23 Sep 2020 08:28    TPro  5.5    /  Alb  2.1    /  TBili  0.5    /  DBili  x      /  AST  21     /  ALT  23     /  AlkPhos  110    23 Sep 2020 08:28      A/P: 65yMale admitted for R knee pain, drainage s/p right knee arthroscopic I+D 7/17, right knee explant and abx spacer 7/22 with Dr. Castro;  and debridement of LLE wound with wound vac placement on 7/30 by Dr. Bowen. Was original d/c'd home on 8/10/20 with PICC and vancomycin IV q12h + rifampin.  Readmitted, and now s/p R hip exchange of hardware on 09/04/2020; I+D of RUQ on 09/04/2020; R Knee Revision Antibiotic Spacer by Dr. Castro, R Knee medial gastroc flap and LLE STSG by Dr. Mckeon on 9/18  - afebrile since 9/17/20 when workup for sepsis was negative (BC NGTD, elevated WBC resolved; surgical wounds did not appear infected; UA negative)  -ESR/CRP elevated yesterday which was expected 72h post-op. Will repeat ESR, CBC on Thursday 9/24.   - diabetes mellitus II with hyperglycemia- Patient has been on sliding scale post-op through the weekend with elevated sugars (250-350). Was well-controlled pre-op. Sugars better controlled since placed on lantus and pre-meal insulin yesterday  - protein calorie malnutrition/ uncontrolled diabetes- Nutrition services consulted, appreciate recs; continue glucerna shakes  - Hypophosphatemia-resolved, 2.8 today after oral supplementation  - hyponatremia- stable at 133, 134; fluid restriction discountinued as per internal medicine recs  - H+H 8.4/28.9- S/p 3units PRBCs in OR on Friday 9/19 (H+H day of surgery 9.2/33.0), and close ICU monitoring. Currently asymptomatic and at baseline of Hgb levels from prior admissions. Continue to monitor labs; transfuse for Hgb < 8.0.   - Pain Control- appreciate pain management recs, gabapentin increased to tid; Avoiding long-term NSAIDs in the setting of CAD. Avoiding standing tylenol in the setting of elevated liver enzymes in the past while on rifampin  - DVT ppx: ASA bid+ effient (home med)  - PT, WBS: TTWB RLE; WBAT LLE; Strict bed rest and PT to be held until Friday 9/25 as per Plastics. Bed exercises OK. No dorsiflexion or plantar flexion of RLE  - aggressive I/S, bowel regimen (reports regular BMs since surgery 9/18)  - appreciate ID recs- Continue dapto, cefepime, rifampin; f/u CPK with dapto on 9/28 (elevated at 209 today from 60 on 9/14); f/o CBC with diff and CMP (elevated liver enzymes in past with rifampin);  - appreciate internal medicine, and cardiology recs   - appreciate plastics recs- Wound vacs on LLE, and R knee to be continued until Friday (possible wound vac changes/ discontinuation Friday 9/25); continue HV and MIS x 1 for now and monitor output.   - RUQ drainage-  thick yellow drainage from RUQ I+D site. Still denies abd pain, N/V. No eythema at site. Drainage remains the same consistency and appearance as last 3 days-Cultures were sent and are negative. Gen surgery team 4 following. Ultrasound ordered as per gen surg team done 9/22 inconclusive. CT of abdomen ordered as per gen surg recs; continue to follow-up recs from Gen Surg after CT of abdomen today  - RUE swelling. No erythema or drainage. F/u RUE doppler to r/o DVT  - appreciate psych recs- currently feels well on regimen today, will reconsult as needed  - R diabetic foot ulcers x 2- debrided 9/16 by podiatry; L ulcer debrided  9/22- dsg changes and care as per podiatry  - dispo: pending medical stabilization/ OOB status Friday; likely PINKY    Ortho Pager [phone]

## 2020-09-23 NOTE — PROGRESS NOTE ADULT - SUBJECTIVE AND OBJECTIVE BOX
Ortho Note    Pt pain better controlled today compared to yesterday. Still experiencing moderate pain over donor sites on thighs. Denies CP, SOB, N/V    Vital Signs Last 24 Hrs  T(C): 37.1 (22 Sep 2020 21:00), Max: 37.1 (22 Sep 2020 13:49)  T(F): 98.7 (22 Sep 2020 21:00), Max: 98.8 (22 Sep 2020 17:11)  HR: 84 (22 Sep 2020 21:00) (84 - 92)  BP: 103/61 (22 Sep 2020 21:00) (101/58 - 112/61)  BP(mean): --  RR: 15 (22 Sep 2020 21:00) (15 - 16)  SpO2: 97% (22 Sep 2020 21:00) (96% - 97%)    RLE:  DSG  R knee Wound vac holding suction at 125, MIS x1, HV x1, in KI, proximal donor site covered in gauze/tape  Pulses: +DP;  feet cool to touch; at baseline; no edema  Sensation: Sensation intact to baseline (diminished sensation- h/o severe neuropathy; follows with vascular Dr. Malloy outpatient)  Motor: 5/5 EHL/FHL    LLE- Wound vac holding suction at 125, proximal donor site covered in gauze/tape  Pulses: feet cool to touch; at baseline; no edema  Sensation: SILT to baseline (diminished sensation- h/o severe neuropathy; follows with vascular Dr. Malloy outpatient)  Motor: 5/5 EHL/FHL/TA/GS               A/P: 65yMale s/p R Knee Revision Antibiotic Spacer by Dr. Castro, R Knee medial gastroc flap and LLE STSG by Dr. Mckeon on 9/18  - Stepped down from SICU on 9/19 to 9WO  - s/p 3u PRBC intraop due to intraop blood loss, postop hgb 9.0, currently stable  - Pt accidentally pulled R arm PICC line 9/21 AM, was replaced on 9/21 by PICC t eam  - Pain Control, continue pain mgmt recs  - DVT ppx: ASA + effient (home med) restarted 9/19  - WBS: TTWB RLE in KI at all times, Strict bedrest x7 days as per Dr. Mckeon for RLE flap healing  - Can have PT evaluate but in bed exercises only  - continue bowel regimen  - appreciate ID recs- Continue dapto, cefepime, rifampin  - appreciate internal medicine, and cardiology recs, resumed all pre-op meds  - appreciate plastics recs- wound vacs staying on x7 days until 9/25  - appreciate gen surg recs- will need cholecystectomy (elective) in near future, currently no abd pain, n/v; dressing changes as per nursing; increased drainage from RUQ starting 9/21, f/u recs, received RUQ US yesterday with final read showing cholelithiasis with contracted gallbladder  - appreciate psych recs- currently feels well on regimen today, will reconsult as needed  - podiatry following for R foot diabetic ulcers x2- debrided at bedside; bactroban applied and dsg changes as per podiatry  - dispo: pending  - ADAT      Ortho Pager [phone]

## 2020-09-23 NOTE — PROGRESS NOTE ADULT - SUBJECTIVE AND OBJECTIVE BOX
Patient is a 65y old  Male who presents with a chief complaint of R Knee Swelling (23 Sep 2020 09:36)      INTERVAL HPI/ OVERNIGHT EVENTS: No overnight events reported. Pt seen and examined at bedside. Pt does not have any pedal complaints at this time and denies N/V/F.       LABS                        8.4    10.37 )-----------( 278      ( 23 Sep 2020 08:28 )             28.9     09-23    133<L>  |  103  |  20  ----------------------------<  188<H>  3.8   |  22  |  0.92    Ca    8.2<L>      23 Sep 2020 08:28  Phos  2.8     09-23  Mg     1.8     09-23    TPro  5.5<L>  /  Alb  2.1<L>  /  TBili  0.5  /  DBili  x   /  AST  21  /  ALT  23  /  AlkPhos  110  09-23        ICU Vital Signs Last 24 Hrs  T(C): 36.2 (23 Sep 2020 09:18), Max: 37.1 (22 Sep 2020 13:49)  T(F): 97.2 (23 Sep 2020 09:18), Max: 98.8 (22 Sep 2020 17:11)  HR: 81 (23 Sep 2020 09:18) (81 - 92)  BP: 92/50 (23 Sep 2020 09:18) (92/50 - 116/68)  BP(mean): --  ABP: --  ABP(mean): --  RR: 15 (23 Sep 2020 09:18) (14 - 16)  SpO2: 96% (23 Sep 2020 09:18) (96% - 97%)      RADIOLOGY  < from: Xray Foot AP + Lateral, Bilateral (09.16.20 @ 14:16) >  Examination of the bilateral feet demonstrates severe arthrosis in the right first PIP joint, with valgus deformity. Calcaneal spurs no fractures. Internal fixation in the left first PIP joint. Soft tissue swelling in the plantar aspect of the left midfoot, without associated adjacent osseous abnormality. No fractures.    < end of copied text >      MICROBIOLOGY    PHYSICAL EXAM  Lower Extremity Focused  Vasc: DP/PT 1-4 b/l, +2 nisa malleolar and dorsum of the foot pitting edema b/l, CFT <3sec b/l  Derm: Right foot - 2nd digit ulcer at tip of toe, no drainage, no malodor, hallux maceration improving; small circular lesions present throughout his leg d/t to injuries related to his cast, Tinea Pedia b/l, onychomycosis. L foot: ulcer on the distal plantar aspect of the 2nd digit  Neuro: Protective sensations not intact from the distal tibia down (stocking distribution)  MSK: Partial amp of digits 2 and 3 on L foot, hammertoes on digits 2-5 on R foot, Hallux rigidus b/l

## 2020-09-23 NOTE — PROGRESS NOTE ADULT - ATTENDING COMMENTS
Patient seen and examined with house-staff during bedside rounds.  Resident note read, including vitals, physical findings, laboratory data, and radiological reports.   Revisions included below.  Direct personal management at bed side and extensive interpretation of the data.  Plan was outlined and discussed in details with the housestaff.  Decision making of high complexity  Action taken for acute disease activity to reflect the level of care provided:  - medication reconciliation  - review laboratory datap    Discontinue fluid restriction.  I changed the dressing on the abdominal wound I discussed the case with Dr. Latif.  The concern as increased drainage from the biliary tract.  CT scan of the chest was reviewed.  Patient will require some sort of cholecystectomy but at this point is hemodynamically stable no evidence of fever and no evidence of leukocytosis and will hold on for high risk surgery at this point.  The swelling from the right arm is most likely related to fluid leakage before the line was pulled out

## 2020-09-23 NOTE — PROGRESS NOTE ADULT - SUBJECTIVE AND OBJECTIVE BOX
The patient denies any abdominal pain, fever, chills, nausea or vomiting, just complaining of increased discharge from the RUQ wound that is still non foul smelling.    MEDICATIONS  (STANDING):  aspirin enteric coated 81 milliGRAM(s) Oral two times a day  cefepime   IVPB 2000 milliGRAM(s) IV Intermittent every 8 hours  chlorhexidine 2% Cloths 1 Application(s) Topical <User Schedule>  DAPTOmycin IVPB 650 milliGRAM(s) IV Intermittent every 24 hours  dextrose 5%. 1000 milliLiter(s) (50 mL/Hr) IV Continuous <Continuous>  dextrose 50% Injectable 12.5 Gram(s) IV Push once  dextrose 50% Injectable 25 Gram(s) IV Push once  dextrose 50% Injectable 25 Gram(s) IV Push once  digoxin     Tablet 0.125 milliGRAM(s) Oral daily  gabapentin 300 milliGRAM(s) Oral three times a day  insulin glargine Injectable (LANTUS) 20 Unit(s) SubCutaneous at bedtime  insulin lispro (HumaLOG) corrective regimen sliding scale   SubCutaneous Before meals and at bedtime  insulin lispro Injectable (HumaLOG) 8 Unit(s) SubCutaneous three times a day before meals  metoprolol succinate ER 25 milliGRAM(s) Oral daily  mupirocin 2% Ointment 1 Application(s) Topical once  nystatin Cream 1 Application(s) Topical two times a day  polyethylene glycol 3350 17 Gram(s) Oral daily  prasugrel 10 milliGRAM(s) Oral daily  rifAMPin 300 milliGRAM(s) Oral every 12 hours  rosuvastatin 10 milliGRAM(s) Oral at bedtime  sertraline 25 milliGRAM(s) Oral daily  spironolactone 25 milliGRAM(s) Oral daily  tamsulosin 0.4 milliGRAM(s) Oral at bedtime    MEDICATIONS  (PRN):  cyclobenzaprine 10 milliGRAM(s) Oral three times a day PRN Muscle Spasm  dextrose 40% Gel 15 Gram(s) Oral once PRN Blood Glucose LESS THAN 70 milliGRAM(s)/deciliter  diphenhydrAMINE 50 milliGRAM(s) Oral every 4 hours PRN Rash and/or Itching  glucagon  Injectable 1 milliGRAM(s) IntraMuscular once PRN Glucose LESS THAN 70 milligrams/deciliter  morphine  - Injectable 4 milliGRAM(s) IV Push every 4 hours PRN breakthrough pain  morphine  IR 15 milliGRAM(s) Oral every 4 hours PRN Moderate Pain (4 - 6)  morphine  IR 30 milliGRAM(s) Oral every 4 hours PRN Severe Pain (7 - 10)  naloxone Injectable 0.1 milliGRAM(s) IV Push every 3 minutes PRN For ANY of the following changes in patient status:  A. RR LESS THAN 10 breaths per minute, B. Oxygen saturation LESS THAN 90%, C. Sedation score of 6  ondansetron Injectable 4 milliGRAM(s) IV Push every 6 hours PRN Nausea  senna 2 Tablet(s) Oral at bedtime PRN Constipation  sodium chloride 0.9% lock flush 10 milliLiter(s) IV Push every 1 hour PRN Pre/post blood products, medications, blood draw, and to maintain line patency      I&O's Detail    22 Sep 2020 07:01  -  23 Sep 2020 07:00  --------------------------------------------------------  IN:    IV PiggyBack: 100 mL  Total IN: 100 mL    OUT:    Accordian (mL): 60 mL    Bulb (mL): 100 mL    VAC (Vacuum Assisted Closure) System (mL): 0 mL    VAC (Vacuum Assisted Closure) System (mL): 25 mL    Voided (mL): 1100 mL  Total OUT: 1285 mL    Total NET: -1185 mL      23 Sep 2020 07:01  -  23 Sep 2020 12:18  --------------------------------------------------------  IN:    IV PiggyBack: 50 mL  Total IN: 50 mL    OUT:  Total OUT: 0 mL    Total NET: 50 mL      Gen: NAD, resting comfortably in bed  Pulm: Good inspiratory effort, nonlabored breathing  C/V: NSR  Abd: Soft, non tender, nondistended. No rebound, no guarding. RUQ some discharge is seen that is thinner than before mixed with serous secretion and not excessive, the area around in non erythematous nor tender  Extrem: WWP, no edema                            8.4    10.37 )-----------( 278      ( 23 Sep 2020 08:28 )             28.9   09-23    133<L>  |  103  |  20  ----------------------------<  188<H>  3.8   |  22  |  0.92    Ca    8.2<L>      23 Sep 2020 08:28  Phos  2.8     09-23  Mg     1.8     09-23    TPro  5.5<L>  /  Alb  2.1<L>  /  TBili  0.5  /  DBili  x   /  AST  21  /  ALT  23  /  AlkPhos  110  09-23  < from: US Abdomen Limited (09.22.20 @ 14:50) >  Findings: The liver borderline enlarged and normal in echogenicity. There are no focal hepatic lesions. There is no intrahepatic biliary ductal dilatation.    The common duct is slightly dilated in caliber, measuring 0.8 cm, similar to prior ultrasound from 9/2/2020.    The gallbladder is again contracted and not well visualized. Echogenic material in possible gallbladder lumen, likely small stones. No pericholecystic fluid.    The pancreas is unremarkable.    The right kidney is normal in size, measuring 13.1 cm in length. There is normal right renal parenchymal thickness and echogenicity. There is no right hydronephrosis.    There is no ascites in the right upper quadrant.    The proximal portions of the aorta and inferior vena cava are unremarkable.    There is a right pleural effusion.    Impression:  1. Cholelithiasis. Limited evaluation due to contracted gallbladder. No pericholecystic fluid.    2. No significant change in mild CBD dilatation.    3. Borderline hepatomegaly.    4. Incidentally seen right pleural effusion.            Thank you for the opportunity to participate in the care of this patient.    < end of copied text >

## 2020-09-23 NOTE — PROGRESS NOTE ADULT - SUBJECTIVE AND OBJECTIVE BOX
Interval Events: Reviewed  Patient seen and examined at bedside.    Patient is a 65y old  Male who presents with a chief complaint of R Knee Swelling (23 Sep 2020 13:13)    he is leaking more from the track  PAST MEDICAL & SURGICAL HISTORY:  Diabetic neuropathy    STEMI (ST elevation myocardial infarction)    Diverticulitis    MRSA bacteremia    History of celiac disease    CHF (congestive heart failure)  EF ~ 25%    HTN (hypertension)    Diabetes  on insulin pump    Blood clot due to device, implant, or graft  was on blood thinners    HLD (hyperlipidemia)    Osteoarthritis    Atherosclerosis of coronary artery  CAD (coronary artery disease)    Status post percutaneous transluminal coronary angioplasty  in 2012    History of open reduction and internal fixation (ORIF) procedure    Surgery, elective  Right shoulder    Surgery, elective  right knee wound debridement    S/P TKR (total knee replacement), right  with infection Mrsa   per pt he was cleared from MRSA infection    S/P CABG x 1  2018    Stented coronary artery  10/18 heart attack  INFERIOR WALL MI    Other postprocedural status  Fixation hardware in foot LEFT        MEDICATIONS:  Pulmonary:    Antimicrobials:  cefepime   IVPB 2000 milliGRAM(s) IV Intermittent every 8 hours  DAPTOmycin IVPB 650 milliGRAM(s) IV Intermittent every 24 hours  rifAMPin 300 milliGRAM(s) Oral every 12 hours    Anticoagulants:  aspirin enteric coated 81 milliGRAM(s) Oral two times a day  prasugrel 10 milliGRAM(s) Oral daily    Cardiac:  digoxin     Tablet 0.125 milliGRAM(s) Oral daily  metoprolol succinate ER 25 milliGRAM(s) Oral daily  spironolactone 25 milliGRAM(s) Oral daily  tamsulosin 0.4 milliGRAM(s) Oral at bedtime      Allergies    ACE inhibitors (Hives)  carvedilol (Other)  enalapril (Hives)  Entresto (Other)    Intolerances        Vital Signs Last 24 Hrs  T(C): 36.7 (23 Sep 2020 18:12), Max: 37 (23 Sep 2020 06:30)  T(F): 98.1 (23 Sep 2020 18:12), Max: 98.6 (23 Sep 2020 06:30)  HR: 84 (23 Sep 2020 19:25) (81 - 90)  BP: 103/58 (23 Sep 2020 19:25) (92/50 - 120/55)  BP(mean): --  RR: 16 (23 Sep 2020 14:12) (14 - 16)  SpO2: 96% (23 Sep 2020 14:12) (96% - 97%)    09-22 @ 07:01 - 09-23 @ 07:00  --------------------------------------------------------  IN: 100 mL / OUT: 1285 mL / NET: -1185 mL    09-23 @ 07:01 - 09-23 @ 21:46  --------------------------------------------------------  IN: 930 mL / OUT: 465 mL / NET: 465 mL          Review of Systems:   •	General: negative  •	Skin/Breast: negative  •	Ophthalmologic: negative  •	ENMT: negative  •	Respiratory and Thorax: negative  •	Cardiovascular: negative  •	Gastrointestinal: negative  •	Genitourinary: negative  •	Musculoskeletal: negative  •	Neurological: negative  •	Psychiatric: negative  •	Hematology/Lymphatics: negative  •	Endocrine: negative  •	Allergic/Immunologic: negative    Physical Exam:   • Constitutional:	Well-developed, well nourished  • Eyes:	EOMI; PERRL; no drainage or redness  • ENMT:	No oral lesions; no gross abnormalities  • Neck	No bruits; no thyromegaly or nodules  • Breasts:	not examined  • Back:	No deformity or limitation of movement  • Respiratory:	Breath Sounds equal & clear to percussion & auscultation, no accessory muscle use  • Cardiovascular:	Regular rate & rhythm, normal S1, S2; no murmurs, gallops or rubs; no S3, S4  • Gastrointestinal:	Soft, non-tender, no hepatosplenomegaly, normal bowel sounds  • Genitourinary:	not examined  • Rectal: not examined  • Extremities:	No cyanosis, clubbing or edema  • Vascular:	Equal and normal pulses (carotid, femoral, dorsalis pedis)  • Neurologica:l	not examined  • Skin:	No lesions; no rash  • Lymph Nodes:	No lymphadedenopathy  • Musculoskeletal:	No joint pain, swelling or deformity; no limitation of movement        LABS:      CBC Full  -  ( 23 Sep 2020 08:28 )  WBC Count : 10.37 K/uL  RBC Count : 3.81 M/uL  Hemoglobin : 8.4 g/dL  Hematocrit : 28.9 %  Platelet Count - Automated : 278 K/uL  Mean Cell Volume : 75.9 fl  Mean Cell Hemoglobin : 22.0 pg  Mean Cell Hemoglobin Concentration : 29.1 gm/dL  Auto Neutrophil # : 7.67 K/uL  Auto Lymphocyte # : 0.83 K/uL  Auto Monocyte # : 1.29 K/uL  Auto Eosinophil # : 0.44 K/uL  Auto Basophil # : 0.10 K/uL  Auto Neutrophil % : 74.0 %  Auto Lymphocyte % : 8.0 %  Auto Monocyte % : 12.4 %  Auto Eosinophil % : 4.2 %  Auto Basophil % : 1.0 %    09-23    133<L>  |  103  |  20  ----------------------------<  188<H>  3.8   |  22  |  0.92    Ca    8.2<L>      23 Sep 2020 08:28  Phos  2.8     09-23  Mg     1.8     09-23    TPro  5.5<L>  /  Alb  2.1<L>  /  TBili  0.5  /  DBili  x   /  AST  21  /  ALT  23  /  AlkPhos  110  09-23    US no dvt of the righ arm  < from: CT Abdomen and Pelvis w/ IV Cont (09.23.20 @ 19:08) >    EXAM:  CT ABDOMEN AND PELVIS IC                          PROCEDURE DATE:  09/23/2020          INTERPRETATION:  CLINICAL INFORMATION: Status post right upper quadrant drainage presenting with increasing amount of strain.    COMPARISON: 9/3/2020    PROCEDURE:  CT of the Abdomen and Pelvis was performed with intravenous contrast.  Intravenous contrast: 90 ml Omnipaque 350. 10 ml discarded.  Oral contrast: None.  Sagittal and coronal reformats were performed.    FINDINGS:    Lower chest: New small to moderate right pleural effusion with passive atelectasis and trace left pleural effusion. Stable scattered few nodules in both lower lungs.    Liver: Normal in size and morphology. No focal abnormality. The main portal vein is patent.  Gallbladder and biliary ducts: Status post cholecystectomy. Interval decrease in extent of slightly hyperdense fluid in the gallbladder fossa (3:39). Tubular focal fat stranding along the prior cutaneous drainage is again noted in the right upper lateral abdominal wall (3:35), with interval decrease in extent compared to prior study. No intra/extrahepatic biliary ductal dilatation.  Spleen: Within normal limits.  Pancreas: Within normal limits.  Adrenals: Within normal limits.  Kidneys/ureters: No hydronephrosis. No urinary calculi. Persistent geographic area of subtle cortical hypoenhancement in the left posterior kidney, however less pronounced compared to prior study. Again noted nonobstructing renal stones. Left extrarenal pelvis. No hydronephrosis.    Bladder: Small intravesical air in the anterior aspect, likely from prior instrumentation.  Reproductive organs: Prostate is within normal limits. Diffuse calcification along bilateral vas deferens.    Bowel: The bowel is normal in caliber. Moderatestool burden throughout the colon.  Peritoneum/retroperitoneum: No ascites.  Vasculature:  The aorta and its branches are normal in caliber. Moderate calcified plaques along the aorta and its branches.  Lymph nodes: Lymph nodes are not enlarged.  Bones and soft tissue: Anasarca, increased in extent compared to prior study.      IMPRESSION:  1.  No evidence of intraperitoneal abscess.  2.  Interval decrease in extent of trace fluid in the cholecystectomy bed and postprocedural change along the percutaneous drainage site.  3.  Persistent, however slightly improved focal cortical hypoenhancement in the left posterior kidney, may reflect improving infectious/inflammatory process.  4.  Anasarca. New bilateral pleural effusion, right greater than the left.    < end of copied text >                  Culture Results:   Culture is being performed. (09-22 @ 00:56)  Culture Results:   Testing in progress (09-22 @ 00:55)      RADIOLOGY & ADDITIONAL STUDIES (The following images were personally reviewed):  Loja:                                     No  Urine output:                       adequate  DVT prophylaxis:                 Yes  Flattus:                                  Yes  Bowel movement:              No

## 2020-09-23 NOTE — PROGRESS NOTE ADULT - ASSESSMENT
65M w/ PMHx of CAD s/p CABG, CHF, DM, HLD, HTN, acute cholecystitis s/p perc sudhir 2/28 (drain removed in late May 2020), recent R TKA 7/22 presented to [facility] with R knee infection s/p R knee hardware exchange on 9/4 and R knee spacer w/ flap from plastics on 9/18.    We did bedside I&D of the RUQ on 09/1/2020       General surgery re-consulted for continued purulent drainage coming from his prior MIS site.     Patient has no pain, his abdominal exam is benign not a much discharge was seen, but was thinner than before, mixed with serous fluid    Afebrile, HDS. WBC downtrending. Normal LFTs RUQ ultrasound showed cholelithiasis    On cefepime and daptomycin by ID    Discussed with Dr. Latif and Chief resident:    - Obtain CT scan A/P with IV contrast  - Daily dressing  - Surgery team 4 will continue to FU

## 2020-09-23 NOTE — PROGRESS NOTE ADULT - SUBJECTIVE AND OBJECTIVE BOX
FLOWER MARISCAL  [id number]    Subjective: Pt in pain due to donor sites of STSG.  Pt had a dry dressing applied to them after the Tegaderms had fallen off.         Objective:  T(C): 37 (09-23-20 @ 06:30), Max: 37.1 (09-22-20 @ 13:49)  HR: 87 (09-23-20 @ 06:30) (84 - 92)  BP: 116/68 (09-23-20 @ 06:30) (101/58 - 116/68)  RR: 14 (09-23-20 @ 06:30) (14 - 16)  SpO2: 97% (09-23-20 @ 06:30) (96% - 97%)  Wt(kg): --   09-22    134<L>  |  105  |  19  ----------------------------<  135<H>  4.1   |  20<L>  |  0.89    Ca    8.2<L>      22 Sep 2020 06:54  Phos  2.4     09-22  Mg     1.8     09-22    TPro  5.7<L>  /  Alb  2.1<L>  /  TBili  0.6  /  DBili  x   /  AST  27  /  ALT  23  /  AlkPhos  112  09-22                        8.9    10.82 )-----------( 252      ( 22 Sep 2020 06:54 )             30.6       09-22 @ 07:01  -  09-23 @ 07:00  --------------------------------------------------------  IN: 100 mL / OUT: 1285 mL / NET: -1185 mL      PHYSICAL EXAM:    General: NAD, Lying in bed in pain  Extrem: RLE wound vac in place holding suction.  HMV/MIS SS output.  Left shin VAc in place holding suction.            MEDICATIONS  (STANDING):  aspirin enteric coated 81 milliGRAM(s) Oral two times a day  cefepime   IVPB 2000 milliGRAM(s) IV Intermittent every 8 hours  chlorhexidine 2% Cloths 1 Application(s) Topical <User Schedule>  DAPTOmycin IVPB 650 milliGRAM(s) IV Intermittent every 24 hours  dextrose 5%. 1000 milliLiter(s) (50 mL/Hr) IV Continuous <Continuous>  dextrose 50% Injectable 12.5 Gram(s) IV Push once  dextrose 50% Injectable 25 Gram(s) IV Push once  dextrose 50% Injectable 25 Gram(s) IV Push once  digoxin     Tablet 0.125 milliGRAM(s) Oral daily  gabapentin 300 milliGRAM(s) Oral three times a day  insulin glargine Injectable (LANTUS) 20 Unit(s) SubCutaneous at bedtime  insulin lispro (HumaLOG) corrective regimen sliding scale   SubCutaneous Before meals and at bedtime  insulin lispro Injectable (HumaLOG) 8 Unit(s) SubCutaneous three times a day before meals  metoprolol succinate ER 25 milliGRAM(s) Oral daily  nystatin Cream 1 Application(s) Topical two times a day  polyethylene glycol 3350 17 Gram(s) Oral daily  prasugrel 10 milliGRAM(s) Oral daily  rifAMPin 300 milliGRAM(s) Oral every 12 hours  rosuvastatin 10 milliGRAM(s) Oral at bedtime  sertraline 25 milliGRAM(s) Oral daily  spironolactone 25 milliGRAM(s) Oral daily  tamsulosin 0.4 milliGRAM(s) Oral at bedtime    MEDICATIONS  (PRN):  cyclobenzaprine 10 milliGRAM(s) Oral three times a day PRN Muscle Spasm  dextrose 40% Gel 15 Gram(s) Oral once PRN Blood Glucose LESS THAN 70 milliGRAM(s)/deciliter  diphenhydrAMINE 50 milliGRAM(s) Oral every 4 hours PRN Rash and/or Itching  glucagon  Injectable 1 milliGRAM(s) IntraMuscular once PRN Glucose LESS THAN 70 milligrams/deciliter  morphine  - Injectable 4 milliGRAM(s) IV Push every 4 hours PRN breakthrough pain  morphine  IR 15 milliGRAM(s) Oral every 4 hours PRN Moderate Pain (4 - 6)  morphine  IR 30 milliGRAM(s) Oral every 4 hours PRN Severe Pain (7 - 10)  naloxone Injectable 0.1 milliGRAM(s) IV Push every 3 minutes PRN For ANY of the following changes in patient status:  A. RR LESS THAN 10 breaths per minute, B. Oxygen saturation LESS THAN 90%, C. Sedation score of 6  ondansetron Injectable 4 milliGRAM(s) IV Push every 6 hours PRN Nausea  senna 2 Tablet(s) Oral at bedtime PRN Constipation  sodium chloride 0.9% lock flush 10 milliLiter(s) IV Push every 1 hour PRN Pre/post blood products, medications, blood draw, and to maintain line patency      Assessment/Plan:  Patient is a 65y old  Male who presents with a chief complaint of R Knee Swelling (23 Sep 2020 05:59)

## 2020-09-23 NOTE — PROGRESS NOTE ADULT - ASSESSMENT
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  He has completed 6 weeks of vancomycin & rifampin.  He is s/p R hip exchange of hardware on 9/4 - OR cultures NGTD.  I&D of abd wall done 9/4 - cultures growing Pseudomonas and Enterococcus faecium. Gallium SPECT done 9/10 showed minimal uptake from GB fossa to soft tissue and was interpreted by surgery as a negative study. S/p R knee spacer exchange, muscle flap and L shin ulcer debridement on 9/18.  Now again with purulent drainage from RUQ.    Suggest:  - f/u cultures from RUQ drainage  - Continue daptomycin  675 mg IV q24h  - Continue cefepime 2 g IV q8h  - Continue rifampin 300 mg po q12h.    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  He has completed 6 weeks of vancomycin & rifampin.  He is s/p R hip exchange of hardware on 9/4 - OR cultures NGTD.  I&D of abd wall done 9/4 - cultures growing Pseudomonas and Enterococcus faecium. Gallium SPECT done 9/10 showed minimal uptake from GB fossa to soft tissue and was interpreted by surgery as a negative study. S/p R knee spacer exchange, muscle flap and L shin ulcer debridement on 9/18.  Now again with purulent drainage from RUQ, s/p ultrasound on 9/22 showing no change in CBD dilation    Recommendations:  - f/u RUQ cultures (9/22)  - fluid cx (9/5 & 9/4): Pseudomonas & VRE faecium  - c/w daptomycin  675 mg IV q24h  - c/w cefepime 2 g IV q8h  - c/w rifampin 300 mg po q12h  - tissue cx (9/18): NGTD    ID team 1 will follow  Primary team updated   66 yo M with HTN, hyperlipidemia, type II DM with peripheral neuropathy, CAD s/p MIDCAB (2018)/VERONICA, HFrEF, AICD (2/20), pulmonary HTN, chronic cholecystitis with recurrent R knee PPJI - MRSA, likely persistent from 11/2019.  He has completed 6 weeks of vancomycin & rifampin.  He is s/p R hip exchange of hardware on 9/4 - OR cultures NGTD.  I&D of abd wall done 9/4 - cultures growing Pseudomonas and Enterococcus faecium. Gallium SPECT done 9/10 showed minimal uptake from GB fossa to soft tissue and was interpreted by surgery as a negative study. S/p R knee spacer exchange, muscle flap and L shin ulcer debridement on 9/18.  Now again with purulent drainage from RUQ, s/p ultrasound on 9/22 showing no change in CBD dilation    Recommendations:  - f/u RUQ cultures (9/22)  - fluid cx (9/5 & 9/4): Pseudomonas & VRE faecium  - c/w daptomycin  675 mg IV q24h  - c/w cefepime 2 g IV q8h  - c/w rifampin 300 mg po q12h  - obtain CPK while on Dapto  - tissue cx (9/18): NGTD    ID team 1 will follow  Primary team updated   66 yo M with HTN, hyperlipidemia, type II DM with peripheral neuropathy, CAD s/p MIDCAB (2018)/VERONICA, HFrEF, AICD (2/20), pulmonary HTN, chronic cholecystitis with recurrent R knee PPJI - MRSA, likely persistent from 11/2019.  He has completed 6 weeks of vancomycin & rifampin.  He is s/p R hip exchange of hardware on 9/4 - OR cultures NGTD.  I&D of abd wall done 9/4 - cultures growing Pseudomonas and Enterococcus faecium. Gallium SPECT done 9/10 showed minimal uptake from GB fossa to soft tissue and was interpreted by surgery as a negative study. S/p R knee spacer exchange, muscle flap and L shin ulcer debridement on 9/18.  Now again with purulent drainage from RUQ, s/p ultrasound on 9/22 showing no change in CBD dilation    Recommendations:  - f/u RUQ cultures (9/22)  - fluid cx (9/5 & 9/4): Pseudomonas & VRE faecium  - c/w daptomycin  675 mg IV q24h  - c/w cefepime 2 g IV q8h  - c/w rifampin 300 mg po q12h  - obtain CPK weekly while on Dapto  - tissue cx (9/18): NGTD    ID team 1 will follow  Primary team updated

## 2020-09-23 NOTE — PROGRESS NOTE ADULT - ASSESSMENT
A/P 65M s/p placement of Abx spacer to R knee, debridement, right gastrocnemius flap with STSG coverage; also with STSG and VAC to LLE anterior lower leg  - Care per primary  - Tegaderm to STSG donor sites only.  No dry dressings. If it needs to be reinforced, place abdominal pads over tegaderms. No dry dressing should be touching the donor sites.  - Continue with VAC without any vac changed for a total of 7 days  - Patient to be on bedrest for 7 days, at which point he can be FWB on LLE and toe touch on RLE.  No contraindication to PT from our perspective, provided that PT occurs in the bed and the patient does not perform dorsiflexion/plantarflexion of the RLE

## 2020-09-23 NOTE — PROGRESS NOTE ADULT - SUBJECTIVE AND OBJECTIVE BOX
VITAL SIGNS:  Vital Signs Last 24 Hrs  T(C): 36.2 (23 Sep 2020 09:18), Max: 37.1 (22 Sep 2020 13:49)  T(F): 97.2 (23 Sep 2020 09:18), Max: 98.8 (22 Sep 2020 17:11)  HR: 81 (23 Sep 2020 09:18) (81 - 92)  BP: 92/50 (23 Sep 2020 09:18) (92/50 - 116/68)  BP(mean): --  RR: 15 (23 Sep 2020 09:18) (14 - 16)  SpO2: 96% (23 Sep 2020 09:18) (96% - 97%)    PHYSICAL EXAM:    General: in NAD, lying comfortably in bed  HEENT: normocephalic, atraumatic; PERRL, anicteric sclera; MMM  Neck: supple, no JVD, no thyromegaly, no lymphadenopathy  Cardiovascular: +S1/S2, RRR, no M/G/R  Respiratory: clear to auscultation B/L; no wheezing, no rales, no rhonchi  Gastrointestinal: soft, NT/ND; +BSx4, no organomegaly  Extremities: WWP; no edema, clubbing or cyanosis  Vascular: 2+ radial, DP/PT pulses B/L  Neurological: AAOx3; no focal deficits    MEDICATIONS:  MEDICATIONS  (STANDING):  aspirin enteric coated 81 milliGRAM(s) Oral two times a day  cefepime   IVPB 2000 milliGRAM(s) IV Intermittent every 8 hours  chlorhexidine 2% Cloths 1 Application(s) Topical <User Schedule>  DAPTOmycin IVPB 650 milliGRAM(s) IV Intermittent every 24 hours  dextrose 5%. 1000 milliLiter(s) (50 mL/Hr) IV Continuous <Continuous>  dextrose 50% Injectable 12.5 Gram(s) IV Push once  dextrose 50% Injectable 25 Gram(s) IV Push once  dextrose 50% Injectable 25 Gram(s) IV Push once  digoxin     Tablet 0.125 milliGRAM(s) Oral daily  gabapentin 300 milliGRAM(s) Oral three times a day  insulin glargine Injectable (LANTUS) 20 Unit(s) SubCutaneous at bedtime  insulin lispro (HumaLOG) corrective regimen sliding scale   SubCutaneous Before meals and at bedtime  insulin lispro Injectable (HumaLOG) 8 Unit(s) SubCutaneous three times a day before meals  metoprolol succinate ER 25 milliGRAM(s) Oral daily  nystatin Cream 1 Application(s) Topical two times a day  polyethylene glycol 3350 17 Gram(s) Oral daily  prasugrel 10 milliGRAM(s) Oral daily  rifAMPin 300 milliGRAM(s) Oral every 12 hours  rosuvastatin 10 milliGRAM(s) Oral at bedtime  sertraline 25 milliGRAM(s) Oral daily  spironolactone 25 milliGRAM(s) Oral daily  tamsulosin 0.4 milliGRAM(s) Oral at bedtime    MEDICATIONS  (PRN):  cyclobenzaprine 10 milliGRAM(s) Oral three times a day PRN Muscle Spasm  dextrose 40% Gel 15 Gram(s) Oral once PRN Blood Glucose LESS THAN 70 milliGRAM(s)/deciliter  diphenhydrAMINE 50 milliGRAM(s) Oral every 4 hours PRN Rash and/or Itching  glucagon  Injectable 1 milliGRAM(s) IntraMuscular once PRN Glucose LESS THAN 70 milligrams/deciliter  morphine  - Injectable 4 milliGRAM(s) IV Push every 4 hours PRN breakthrough pain  morphine  IR 15 milliGRAM(s) Oral every 4 hours PRN Moderate Pain (4 - 6)  morphine  IR 30 milliGRAM(s) Oral every 4 hours PRN Severe Pain (7 - 10)  naloxone Injectable 0.1 milliGRAM(s) IV Push every 3 minutes PRN For ANY of the following changes in patient status:  A. RR LESS THAN 10 breaths per minute, B. Oxygen saturation LESS THAN 90%, C. Sedation score of 6  ondansetron Injectable 4 milliGRAM(s) IV Push every 6 hours PRN Nausea  senna 2 Tablet(s) Oral at bedtime PRN Constipation  sodium chloride 0.9% lock flush 10 milliLiter(s) IV Push every 1 hour PRN Pre/post blood products, medications, blood draw, and to maintain line patency      ALLERGIES:  Allergies    ACE inhibitors (Hives)  carvedilol (Other)  enalapril (Hives)  Entresto (Other)    Intolerances        LABS:                        8.4    10.37 )-----------( 278      ( 23 Sep 2020 08:28 )             28.9     09-23    133<L>  |  103  |  20  ----------------------------<  188<H>  3.8   |  22  |  0.92    Ca    8.2<L>      23 Sep 2020 08:28  Phos  2.8     09-23  Mg     1.8     09-23    TPro  5.5<L>  /  Alb  2.1<L>  /  TBili  0.5  /  DBili  x   /  AST  21  /  ALT  23  /  AlkPhos  110  09-23        CAPILLARY BLOOD GLUCOSE      POCT Blood Glucose.: 192 mg/dL (23 Sep 2020 07:42)      RADIOLOGY & ADDITIONAL TESTS: Reviewed.   Interval HPI/overnight events: No acute events reported overnight, patient still reporting drainage from RUQ     VITAL SIGNS:  Vital Signs Last 24 Hrs  T(C): 36.2 (23 Sep 2020 09:18), Max: 37.1 (22 Sep 2020 13:49)  T(F): 97.2 (23 Sep 2020 09:18), Max: 98.8 (22 Sep 2020 17:11)  HR: 81 (23 Sep 2020 09:18) (81 - 92)  BP: 92/50 (23 Sep 2020 09:18) (92/50 - 116/68)  BP(mean): --  RR: 15 (23 Sep 2020 09:18) (14 - 16)  SpO2: 96% (23 Sep 2020 09:18) (96% - 97%)    PHYSICAL EXAM:  General: in NAD, lying comfortably in bed  HEENT: normocephalic, atraumatic; PERRL, anicteric sclera; MMM  Neck: supple, no JVD, no thyromegaly, no lymphadenopathy  Cardiovascular: +S1/S2, RRR, no M/G/R  Respiratory: clear to auscultation B/L; no wheezing, no rales, no rhonchi  Gastrointestinal: soft, NT/ND; +BSx4, +RUQ serosanguinous drainage  Extremities: WWP; no edema, clubbing or cyanosis  Vascular: 2+ radial, DP/PT pulses B/L  Neurological: no focal deficits    MEDICATIONS:  MEDICATIONS  (STANDING):  aspirin enteric coated 81 milliGRAM(s) Oral two times a day  cefepime   IVPB 2000 milliGRAM(s) IV Intermittent every 8 hours  chlorhexidine 2% Cloths 1 Application(s) Topical <User Schedule>  DAPTOmycin IVPB 650 milliGRAM(s) IV Intermittent every 24 hours  dextrose 5%. 1000 milliLiter(s) (50 mL/Hr) IV Continuous <Continuous>  dextrose 50% Injectable 12.5 Gram(s) IV Push once  dextrose 50% Injectable 25 Gram(s) IV Push once  dextrose 50% Injectable 25 Gram(s) IV Push once  digoxin     Tablet 0.125 milliGRAM(s) Oral daily  gabapentin 300 milliGRAM(s) Oral three times a day  insulin glargine Injectable (LANTUS) 20 Unit(s) SubCutaneous at bedtime  insulin lispro (HumaLOG) corrective regimen sliding scale   SubCutaneous Before meals and at bedtime  insulin lispro Injectable (HumaLOG) 8 Unit(s) SubCutaneous three times a day before meals  metoprolol succinate ER 25 milliGRAM(s) Oral daily  nystatin Cream 1 Application(s) Topical two times a day  polyethylene glycol 3350 17 Gram(s) Oral daily  prasugrel 10 milliGRAM(s) Oral daily  rifAMPin 300 milliGRAM(s) Oral every 12 hours  rosuvastatin 10 milliGRAM(s) Oral at bedtime  sertraline 25 milliGRAM(s) Oral daily  spironolactone 25 milliGRAM(s) Oral daily  tamsulosin 0.4 milliGRAM(s) Oral at bedtime    MEDICATIONS  (PRN):  cyclobenzaprine 10 milliGRAM(s) Oral three times a day PRN Muscle Spasm  dextrose 40% Gel 15 Gram(s) Oral once PRN Blood Glucose LESS THAN 70 milliGRAM(s)/deciliter  diphenhydrAMINE 50 milliGRAM(s) Oral every 4 hours PRN Rash and/or Itching  glucagon  Injectable 1 milliGRAM(s) IntraMuscular once PRN Glucose LESS THAN 70 milligrams/deciliter  morphine  - Injectable 4 milliGRAM(s) IV Push every 4 hours PRN breakthrough pain  morphine  IR 15 milliGRAM(s) Oral every 4 hours PRN Moderate Pain (4 - 6)  morphine  IR 30 milliGRAM(s) Oral every 4 hours PRN Severe Pain (7 - 10)  naloxone Injectable 0.1 milliGRAM(s) IV Push every 3 minutes PRN For ANY of the following changes in patient status:  A. RR LESS THAN 10 breaths per minute, B. Oxygen saturation LESS THAN 90%, C. Sedation score of 6  ondansetron Injectable 4 milliGRAM(s) IV Push every 6 hours PRN Nausea  senna 2 Tablet(s) Oral at bedtime PRN Constipation  sodium chloride 0.9% lock flush 10 milliLiter(s) IV Push every 1 hour PRN Pre/post blood products, medications, blood draw, and to maintain line patency      ALLERGIES:  Allergies    ACE inhibitors (Hives)  carvedilol (Other)  enalapril (Hives)  Entresto (Other)    Intolerances        LABS:                        8.4    10.37 )-----------( 278      ( 23 Sep 2020 08:28 )             28.9     09-23    133<L>  |  103  |  20  ----------------------------<  188<H>  3.8   |  22  |  0.92    Ca    8.2<L>      23 Sep 2020 08:28  Phos  2.8     09-23  Mg     1.8     09-23    TPro  5.5<L>  /  Alb  2.1<L>  /  TBili  0.5  /  DBili  x   /  AST  21  /  ALT  23  /  AlkPhos  110  09-23        CAPILLARY BLOOD GLUCOSE      POCT Blood Glucose.: 192 mg/dL (23 Sep 2020 07:42)      RADIOLOGY & ADDITIONAL TESTS: Reviewed.

## 2020-09-23 NOTE — PROGRESS NOTE ADULT - SUBJECTIVE AND OBJECTIVE BOX
Pain Management Progress Note - [facility] [phone]        HPI: Patient seen and examined today. Patient with a history of knee pain, diabetic neuropathy, CHF, HTN, MRSA bacteremia, diverticulitis, COPD, hyperkalemia, chronic systolic CHF, osteoarthritis, s/p R Revision TKA and antibiotic spacer by Dr. Castro on 7/22, now presenting with R knee pain and swelling x3 days, now s/p R knee spacer exchange, gastro flap with skin graft.  Patient reports R knee pain, LLE pain, patient reports moderate pain relief with  current pain medication regimen. Patient Axox3, denies any itchiness from Morphine Po. LLE and R knee dressing intact. x1 bulb drain, x1 hemovac drain present, bilateral feet wrapped with curlex. Woundvac to LLE. RUE swollen.       Pain is _x__ sharp ____dull ___burning _x__achy ___ Intensity: ____ mild _x__mod x__severe     Location _x___surgical site ____cervical _____lumbar ____abd ____upper ext__x__lower ext    Worse with _x___activity _x___movement _____physical therapy___ Rest    Improved with _x___medication __x__rest ____physical therapy      mupirocin 2% Ointment  ketorolac   Injectable  gabapentin  morphine  - Injectable  ketorolac   Injectable  potassium phosphate / sodium phosphate Powder (PHOS-NaK)  insulin glargine Injectable (LANTUS)  chlorhexidine 2% Cloths  sodium chloride 0.9% lock flush  gabapentin  gabapentin  sertraline  rosuvastatin  senna  polyethylene glycol 3350  nystatin Cream  potassium phosphate / sodium phosphate Powder (PHOS-NaK)  insulin glargine Injectable (LANTUS)  insulin lispro Injectable (HumaLOG)  cyclobenzaprine  aspirin enteric coated  diphenhydrAMINE  tamsulosin  HYDROmorphone  Injectable  digoxin     Tablet  HYDROmorphone PCA (1 mG/mL)  prasugrel  aspirin enteric coated  ondansetron Injectable  naloxone Injectable  HYDROmorphone PCA (1 mG/mL)  spironolactone  digoxin     Tablet  glucagon  Injectable  dextrose 50% Injectable  dextrose 50% Injectable  dextrose 50% Injectable  dextrose 40% Gel  dextrose 5%.  insulin lispro (HumaLOG) corrective regimen sliding scale  magnesium sulfate  IVPB  metoprolol succinate ER  cefepime   IVPB  rifAMPin  DAPTOmycin IVPB  morphine  - Injectable  gabapentin  cyclobenzaprine  morphine  IR  morphine  IR  lactated ringers.  HYDROmorphone  Injectable  BUpivacaine liposome 1.3% Injectable (no eMAR)  morphine  IR  morphine  IR  morphine  - Injectable  lactated ringers.  BACItracin   Ointment  mupirocin 2% Ointment  nystatin Cream  morphine  IR  morphine  IR  nystatin Powder  morphine  - Injectable  chlorhexidine 2% Cloths  insulin glargine Injectable (LANTUS)  insulin glargine Injectable (LANTUS)  insulin glargine Injectable (LANTUS)  pantoprazole  Injectable  metoclopramide Injectable  acetaminophen   Tablet ..  diphenhydrAMINE   Injectable  lactated ringers.  levothyroxine  sodium chloride 0.9% Bolus  gabapentin  gabapentin  gabapentin  sertraline  insulin glargine Injectable (LANTUS)  morphine  IR  morphine  IR  morphine  - Injectable  HYDROmorphone   Tablet  HYDROmorphone   Tablet  insulin lispro Injectable (HumaLOG)  diphenhydrAMINE  insulin glargine Injectable (LANTUS)  diazepam    Tablet  cefepime   IVPB  cefepime   IVPB  cefepime   IVPB  DAPTOmycin IVPB  insulin lispro Injectable (HumaLOG)  insulin glargine Injectable (LANTUS)  insulin lispro (HumaLOG) corrective regimen sliding scale  HYDROmorphone  Injectable  rifAMPin  glucagon  Injectable  dextrose 50% Injectable  dextrose 50% Injectable  dextrose 50% Injectable  dextrose 40% Gel  dextrose 5%.  lidocaine 2% Injectable  lidocaine 2% Injectable  tamsulosin  rosuvastatin  gabapentin  cyclobenzaprine  spironolactone  atorvastatin  metoprolol succinate ER  prasugrel  digoxin     Tablet  vancomycin  IVPB  aspirin  chewable  HYDROmorphone  Injectable  senna  bisacodyl Suppository  polyethylene glycol 3350  ondansetron Injectable  aluminum hydroxide/magnesium hydroxide/simethicone Suspension  acetaminophen   Tablet ..  lactated ringers.  oxyCODONE    IR  oxyCODONE    IR  morphine  - Injectable  vancomycin  IVPB  oxyCODONE    IR  oxyCODONE    IR  rosuvastatin  lactated ringers.  chlorhexidine 2% Cloths  povidone iodine 5% Nasal Swab  spironolactone  diphenhydrAMINE  chlorhexidine 4% Liquid  sodium chloride 0.9% lock flush  cyclobenzaprine  gabapentin  HYDROmorphone  Injectable  HYDROmorphone   Tablet  HYDROmorphone   Tablet  HYDROmorphone  Injectable  glucagon  Injectable  dextrose 50% Injectable  dextrose 50% Injectable  dextrose 50% Injectable  dextrose 40% Gel  dextrose 5%.  insulin lispro (HumaLOG) corrective regimen sliding scale  tamsulosin  spironolactone  vancomycin  IVPB  pantoprazole    Tablet  silver sulfADIAZINE 1% Cream  metoprolol succinate ER  rifAMPin  prasugrel  atorvastatin  DULoxetine  tamsulosin Oral Tab/Cap - Peds  oxyCODONE    IR  oxyCODONE  ER Tablet  celecoxib  aspirin enteric coated  acetaminophen   Tablet ..  spironolactone Oral Tab/Cap - Peds  HYDROmorphone  Injectable      ROS: Const:  -___febrile   Eyes:___ENT:___CV: __-_chest pain  Resp: __-__sob  GI:_-__nausea __-_vomiting __ abd pain ___npo ___clears _x_full diet __bm  :___ Musk: _x__pain _-__spasm  Skin:___ Neuro:  _-__djoltptc_-__obcfaqqtf_-__ numbness _-__weakness __x_paresth  Psych:_-_anxiety  Endo:___ Heme:___Allergy:_________, _x__all others reviewed and negative      PAST MEDICAL & SURGICAL HISTORY:  Diabetic neuropathy  STEMI (ST elevation myocardial infarction)  Diverticulitis  MRSA bacteremia  History of celiac disease  CHF (congestive heart failure): EF ~ 25%  HTN (hypertension)  Diabetes: on insulin pump  Blood clot due to device, implant, or graft: was on blood thinners  HLD (hyperlipidemia)  Osteoarthritis  Atherosclerosis of coronary artery: CAD (coronary artery disease)  Status post percutaneous transluminal coronary angioplasty: in 2012  History of open reduction and internal fixation (ORIF) procedure  Surgery, elective: Right shoulder  Surgery, elective: right knee wound debridement  S/P TKR (total knee replacement), right: with infection Mrsa   per pt he was cleared from MRSA infection  S/P CABG x 1: 2018  Stented coronary artery: 10/18 heart attack  INFERIOR WALL MI  Other postprocedural status: Fixation hardware in foot LEFT      09-23 @ 08:2888 mL/min/1.73M2      Hemoglobin: 8.4 g/dL (09-23 @ 08:28)  Hemoglobin: 8.9 g/dL (09-22 @ 06:54)        T(C): 36.2 (09-23-20 @ 09:18), Max: 37.1 (09-22-20 @ 13:49)  HR: 81 (09-23-20 @ 09:18) (81 - 92)  BP: 109/66 (09-23-20 @ 12:10) (92/50 - 116/68)  RR: 16 (09-23-20 @ 12:10) (14 - 16)  SpO2: 96% (09-23-20 @ 09:18) (96% - 97%)  Wt(kg): --          PHYSICAL EXAM:  Gen Appearance: x___no acute distress _x__appropriate        Neuro: _x__SILT feet____ EOM Intact Psych: AAOX__3, x___mood/affect appropriate        Eyes: __x_conjunctiva WNL  __x__ Pupils equal and round        ENT: x___ears and nose atraumatic_x__ Hearing grossly intact        Neck: _x__trachea midline, no visible masses ___thyroid without palpable mass    Resp: _x__Nml WOB____No tactile fremitus ___clear to auscultation    Cardio: ___extremities free from edema __x__pedal pulses palpable    GI/Abdomen: __x_soft ___x__ Nontender___x___Nondistended_____HSM    Lymphatic: ___no palpable nodes in neck  ___no palpable nodes calves and feet    Skin/Wound: ___Incision, _x__Dressing c/d/i,   ____surrounding tissues soft,  ___drain/chest tube present____    Muscular: EHL _4__/5  Gastrocnemius_4__/5    ___absent clubbing/cyanosis          ASSESSMENT: This is a 65y old Male with a history of knee pain, diabetic neuropathy, CHF, HTN, MRSA bacteremia, diverticulitis, COPD, hyperkalemia, chronic systolic CHF, osteoarthritis,  s/p R Revision TKA and antibiotic spacer by Dr. Castro on 7/22, now presenting with R knee pain and swelling x3 days, now s/p R knee spacer exchange, gastro flap with skin graft, pain controlled with current pain medication regimen.      Recommended Treatment PLAN:  1. Morphine IR 30mg Po Q4h prn moderate to severe pain  2. Flexeril 5mg PO Q8h prn muscle spasms  3. Gabapentin 300mg PO TID  4. Morphine 4mg IVP Q4h prn breakthrough pain  Plan discussed with Dr. Chavez     Pain Management Progress Note - [facility] [phone]        HPI: Patient seen and examined today. Patient with a history of knee pain, diabetic neuropathy, CHF, HTN, MRSA bacteremia, diverticulitis, COPD, hyperkalemia, chronic systolic CHF, osteoarthritis, s/p R Revision TKA and antibiotic spacer by Dr. Castro on 7/22, now presenting with R knee pain and swelling x3 days, now s/p R knee spacer exchange, gastro flap with skin graft.  Patient reports R knee pain, LLE pain, patient reports moderate pain relief with  current pain medication regimen. Patient Axox3, denies any itchiness from Morphine Po. LLE and R knee dressing intact. x1 bulb drain, x1 hemovac drain present, bilateral feet wrapped with curlex. Woundvac to LLE. RUE swollen.       Pain is _x__ sharp ____dull ___burning _x__achy ___ Intensity: ____ mild _x__mod x__severe     Location _x___surgical site ____cervical _____lumbar ____abd ____upper ext__x__lower ext    Worse with _x___activity _x___movement _____physical therapy___ Rest    Improved with _x___medication __x__rest ____physical therapy      mupirocin 2% Ointment  ketorolac   Injectable  gabapentin  morphine  - Injectable  ketorolac   Injectable  potassium phosphate / sodium phosphate Powder (PHOS-NaK)  insulin glargine Injectable (LANTUS)  chlorhexidine 2% Cloths  sodium chloride 0.9% lock flush  gabapentin  gabapentin  sertraline  rosuvastatin  senna  polyethylene glycol 3350  nystatin Cream  potassium phosphate / sodium phosphate Powder (PHOS-NaK)  insulin glargine Injectable (LANTUS)  insulin lispro Injectable (HumaLOG)  cyclobenzaprine  aspirin enteric coated  diphenhydrAMINE  tamsulosin  HYDROmorphone  Injectable  digoxin     Tablet  HYDROmorphone PCA (1 mG/mL)  prasugrel  aspirin enteric coated  ondansetron Injectable  naloxone Injectable  HYDROmorphone PCA (1 mG/mL)  spironolactone  digoxin     Tablet  glucagon  Injectable  dextrose 50% Injectable  dextrose 50% Injectable  dextrose 50% Injectable  dextrose 40% Gel  dextrose 5%.  insulin lispro (HumaLOG) corrective regimen sliding scale  magnesium sulfate  IVPB  metoprolol succinate ER  cefepime   IVPB  rifAMPin  DAPTOmycin IVPB  morphine  - Injectable  gabapentin  cyclobenzaprine  morphine  IR  morphine  IR  lactated ringers.  HYDROmorphone  Injectable  BUpivacaine liposome 1.3% Injectable (no eMAR)  morphine  IR  morphine  IR  morphine  - Injectable  lactated ringers.  BACItracin   Ointment  mupirocin 2% Ointment  nystatin Cream  morphine  IR  morphine  IR  nystatin Powder  morphine  - Injectable  chlorhexidine 2% Cloths  insulin glargine Injectable (LANTUS)  insulin glargine Injectable (LANTUS)  insulin glargine Injectable (LANTUS)  pantoprazole  Injectable  metoclopramide Injectable  acetaminophen   Tablet ..  diphenhydrAMINE   Injectable  lactated ringers.  levothyroxine  sodium chloride 0.9% Bolus  gabapentin  gabapentin  gabapentin  sertraline  insulin glargine Injectable (LANTUS)  morphine  IR  morphine  IR  morphine  - Injectable  HYDROmorphone   Tablet  HYDROmorphone   Tablet  insulin lispro Injectable (HumaLOG)  diphenhydrAMINE  insulin glargine Injectable (LANTUS)  diazepam    Tablet  cefepime   IVPB  cefepime   IVPB  cefepime   IVPB  DAPTOmycin IVPB  insulin lispro Injectable (HumaLOG)  insulin glargine Injectable (LANTUS)  insulin lispro (HumaLOG) corrective regimen sliding scale  HYDROmorphone  Injectable  rifAMPin  glucagon  Injectable  dextrose 50% Injectable  dextrose 50% Injectable  dextrose 50% Injectable  dextrose 40% Gel  dextrose 5%.  lidocaine 2% Injectable  lidocaine 2% Injectable  tamsulosin  rosuvastatin  gabapentin  cyclobenzaprine  spironolactone  atorvastatin  metoprolol succinate ER  prasugrel  digoxin     Tablet  vancomycin  IVPB  aspirin  chewable  HYDROmorphone  Injectable  senna  bisacodyl Suppository  polyethylene glycol 3350  ondansetron Injectable  aluminum hydroxide/magnesium hydroxide/simethicone Suspension  acetaminophen   Tablet ..  lactated ringers.  oxyCODONE    IR  oxyCODONE    IR  morphine  - Injectable  vancomycin  IVPB  oxyCODONE    IR  oxyCODONE    IR  rosuvastatin  lactated ringers.  chlorhexidine 2% Cloths  povidone iodine 5% Nasal Swab  spironolactone  diphenhydrAMINE  chlorhexidine 4% Liquid  sodium chloride 0.9% lock flush  cyclobenzaprine  gabapentin  HYDROmorphone  Injectable  HYDROmorphone   Tablet  HYDROmorphone   Tablet  HYDROmorphone  Injectable  glucagon  Injectable  dextrose 50% Injectable  dextrose 50% Injectable  dextrose 50% Injectable  dextrose 40% Gel  dextrose 5%.  insulin lispro (HumaLOG) corrective regimen sliding scale  tamsulosin  spironolactone  vancomycin  IVPB  pantoprazole    Tablet  silver sulfADIAZINE 1% Cream  metoprolol succinate ER  rifAMPin  prasugrel  atorvastatin  DULoxetine  tamsulosin Oral Tab/Cap - Peds  oxyCODONE    IR  oxyCODONE  ER Tablet  celecoxib  aspirin enteric coated  acetaminophen   Tablet ..  spironolactone Oral Tab/Cap - Peds  HYDROmorphone  Injectable      ROS: Const:  -___febrile   Eyes:___ENT:___CV: __-_chest pain  Resp: __-__sob  GI:_-__nausea __-_vomiting __ abd pain ___npo ___clears _x_full diet __bm  :___ Musk: _x__pain _-__spasm  Skin:___ Neuro:  _-__xkmhfpuv_-__txkabhwnc_-__ numbness _-__weakness __x_paresth  Psych:_-_anxiety  Endo:___ Heme:___Allergy:_________, _x__all others reviewed and negative      PAST MEDICAL & SURGICAL HISTORY:  Diabetic neuropathy  STEMI (ST elevation myocardial infarction)  Diverticulitis  MRSA bacteremia  History of celiac disease  CHF (congestive heart failure): EF ~ 25%  HTN (hypertension)  Diabetes: on insulin pump  Blood clot due to device, implant, or graft: was on blood thinners  HLD (hyperlipidemia)  Osteoarthritis  Atherosclerosis of coronary artery: CAD (coronary artery disease)  Status post percutaneous transluminal coronary angioplasty: in 2012  History of open reduction and internal fixation (ORIF) procedure  Surgery, elective: Right shoulder  Surgery, elective: right knee wound debridement  S/P TKR (total knee replacement), right: with infection Mrsa   per pt he was cleared from MRSA infection  S/P CABG x 1: 2018  Stented coronary artery: 10/18 heart attack  INFERIOR WALL MI  Other postprocedural status: Fixation hardware in foot LEFT      09-23 @ 08:2888 mL/min/1.73M2      Hemoglobin: 8.4 g/dL (09-23 @ 08:28)  Hemoglobin: 8.9 g/dL (09-22 @ 06:54)        T(C): 36.2 (09-23-20 @ 09:18), Max: 37.1 (09-22-20 @ 13:49)  HR: 81 (09-23-20 @ 09:18) (81 - 92)  BP: 109/66 (09-23-20 @ 12:10) (92/50 - 116/68)  RR: 16 (09-23-20 @ 12:10) (14 - 16)  SpO2: 96% (09-23-20 @ 09:18) (96% - 97%)  Wt(kg): --          PHYSICAL EXAM:  Gen Appearance: x___no acute distress _x__appropriate        Neuro: _x__SILT feet____ EOM Intact Psych: AAOX__3, x___mood/affect appropriate        Eyes: __x_conjunctiva WNL  __x__ Pupils equal and round        ENT: x___ears and nose atraumatic_x__ Hearing grossly intact        Neck: _x__trachea midline, no visible masses ___thyroid without palpable mass    Resp: _x__Nml WOB____No tactile fremitus ___clear to auscultation    Cardio: ___extremities free from edema __x__pedal pulses palpable    GI/Abdomen: __x_soft ___x__ Nontender___x___Nondistended_____HSM    Lymphatic: ___no palpable nodes in neck  ___no palpable nodes calves and feet    Skin/Wound: ___Incision, _x__Dressing c/d/i,   ____surrounding tissues soft,  ___drain/chest tube present____    Muscular: EHL _4__/5  Gastrocnemius_4__/5    ___absent clubbing/cyanosis          ASSESSMENT: This is a 65y old Male with a history of knee pain, diabetic neuropathy, CHF, HTN, MRSA bacteremia, diverticulitis, COPD, hyperkalemia, chronic systolic CHF, osteoarthritis,  s/p R Revision TKA and antibiotic spacer by Dr. Castro on 7/22, now presenting with R knee pain and swelling x3 days, now s/p R knee spacer exchange, gastro flap with skin graft, pain controlled with current pain medication regimen.      Recommended Treatment PLAN:  1. Morphine IR 30mg Po Q4h prn moderate to severe pain  2. Flexeril 5mg PO Q8h prn muscle spasms  3. Gabapentin 300mg PO TID  4. Morphine 4mg IVP Q4h prn breakthrough pain  Plan discussed with Dr. Chavez

## 2020-09-24 LAB
ALBUMIN SERPL ELPH-MCNC: 2.4 G/DL — LOW (ref 3.3–5)
ALP SERPL-CCNC: 112 U/L — SIGNIFICANT CHANGE UP (ref 40–120)
ALT FLD-CCNC: 24 U/L — SIGNIFICANT CHANGE UP (ref 10–45)
ANION GAP SERPL CALC-SCNC: 11 MMOL/L — SIGNIFICANT CHANGE UP (ref 5–17)
AST SERPL-CCNC: 26 U/L — SIGNIFICANT CHANGE UP (ref 10–40)
BASOPHILS # BLD AUTO: 0.07 K/UL — SIGNIFICANT CHANGE UP (ref 0–0.2)
BASOPHILS NFR BLD AUTO: 0.6 % — SIGNIFICANT CHANGE UP (ref 0–2)
BILIRUB SERPL-MCNC: 0.5 MG/DL — SIGNIFICANT CHANGE UP (ref 0.2–1.2)
BUN SERPL-MCNC: 18 MG/DL — SIGNIFICANT CHANGE UP (ref 7–23)
CALCIUM SERPL-MCNC: 8.1 MG/DL — LOW (ref 8.4–10.5)
CHLORIDE SERPL-SCNC: 102 MMOL/L — SIGNIFICANT CHANGE UP (ref 96–108)
CO2 SERPL-SCNC: 21 MMOL/L — LOW (ref 22–31)
CREAT SERPL-MCNC: 0.94 MG/DL — SIGNIFICANT CHANGE UP (ref 0.5–1.3)
CRP SERPL-MCNC: 6.66 MG/DL — HIGH (ref 0–0.4)
EOSINOPHIL # BLD AUTO: 0.36 K/UL — SIGNIFICANT CHANGE UP (ref 0–0.5)
EOSINOPHIL NFR BLD AUTO: 3.2 % — SIGNIFICANT CHANGE UP (ref 0–6)
ERYTHROCYTE [SEDIMENTATION RATE] IN BLOOD: 47 MM/HR — HIGH
GLUCOSE BLDC GLUCOMTR-MCNC: 115 MG/DL — HIGH (ref 70–99)
GLUCOSE BLDC GLUCOMTR-MCNC: 142 MG/DL — HIGH (ref 70–99)
GLUCOSE BLDC GLUCOMTR-MCNC: 197 MG/DL — HIGH (ref 70–99)
GLUCOSE BLDC GLUCOMTR-MCNC: 213 MG/DL — HIGH (ref 70–99)
GLUCOSE SERPL-MCNC: 108 MG/DL — HIGH (ref 70–99)
GRAM STN FLD: SIGNIFICANT CHANGE UP
HCT VFR BLD CALC: 29.6 % — LOW (ref 39–50)
HGB BLD-MCNC: 8.4 G/DL — LOW (ref 13–17)
IMM GRANULOCYTES NFR BLD AUTO: 0.4 % — SIGNIFICANT CHANGE UP (ref 0–1.5)
LYMPHOCYTES # BLD AUTO: 0.86 K/UL — LOW (ref 1–3.3)
LYMPHOCYTES # BLD AUTO: 7.6 % — LOW (ref 13–44)
MCHC RBC-ENTMCNC: 21.9 PG — LOW (ref 27–34)
MCHC RBC-ENTMCNC: 28.4 GM/DL — LOW (ref 32–36)
MCV RBC AUTO: 77.1 FL — LOW (ref 80–100)
MONOCYTES # BLD AUTO: 1.47 K/UL — HIGH (ref 0–0.9)
MONOCYTES NFR BLD AUTO: 13 % — SIGNIFICANT CHANGE UP (ref 2–14)
NEUTROPHILS # BLD AUTO: 8.49 K/UL — HIGH (ref 1.8–7.4)
NEUTROPHILS NFR BLD AUTO: 75.2 % — SIGNIFICANT CHANGE UP (ref 43–77)
NRBC # BLD: 0 /100 WBCS — SIGNIFICANT CHANGE UP (ref 0–0)
PLATELET # BLD AUTO: 300 K/UL — SIGNIFICANT CHANGE UP (ref 150–400)
POTASSIUM SERPL-MCNC: 4 MMOL/L — SIGNIFICANT CHANGE UP (ref 3.5–5.3)
POTASSIUM SERPL-SCNC: 4 MMOL/L — SIGNIFICANT CHANGE UP (ref 3.5–5.3)
PROT SERPL-MCNC: 6.1 G/DL — SIGNIFICANT CHANGE UP (ref 6–8.3)
RBC # BLD: 3.84 M/UL — LOW (ref 4.2–5.8)
RBC # FLD: 23.4 % — HIGH (ref 10.3–14.5)
SODIUM SERPL-SCNC: 134 MMOL/L — LOW (ref 135–145)
SPECIMEN SOURCE: SIGNIFICANT CHANGE UP
SURGICAL PATHOLOGY STUDY: SIGNIFICANT CHANGE UP
WBC # BLD: 11.3 K/UL — HIGH (ref 3.8–10.5)
WBC # FLD AUTO: 11.3 K/UL — HIGH (ref 3.8–10.5)

## 2020-09-24 PROCEDURE — 99232 SBSQ HOSP IP/OBS MODERATE 35: CPT

## 2020-09-24 PROCEDURE — 99232 SBSQ HOSP IP/OBS MODERATE 35: CPT | Mod: GC

## 2020-09-24 RX ADMIN — Medication 4: at 22:40

## 2020-09-24 RX ADMIN — MORPHINE SULFATE 4 MILLIGRAM(S): 50 CAPSULE, EXTENDED RELEASE ORAL at 04:48

## 2020-09-24 RX ADMIN — NYSTATIN CREAM 1 APPLICATION(S): 100000 CREAM TOPICAL at 21:11

## 2020-09-24 RX ADMIN — SERTRALINE 25 MILLIGRAM(S): 25 TABLET, FILM COATED ORAL at 12:53

## 2020-09-24 RX ADMIN — GABAPENTIN 300 MILLIGRAM(S): 400 CAPSULE ORAL at 15:20

## 2020-09-24 RX ADMIN — SPIRONOLACTONE 25 MILLIGRAM(S): 25 TABLET, FILM COATED ORAL at 06:45

## 2020-09-24 RX ADMIN — Medication 0.12 MILLIGRAM(S): at 06:42

## 2020-09-24 RX ADMIN — MORPHINE SULFATE 30 MILLIGRAM(S): 50 CAPSULE, EXTENDED RELEASE ORAL at 18:17

## 2020-09-24 RX ADMIN — MORPHINE SULFATE 4 MILLIGRAM(S): 50 CAPSULE, EXTENDED RELEASE ORAL at 21:25

## 2020-09-24 RX ADMIN — POLYETHYLENE GLYCOL 3350 17 GRAM(S): 17 POWDER, FOR SOLUTION ORAL at 12:56

## 2020-09-24 RX ADMIN — PRASUGREL 10 MILLIGRAM(S): 5 TABLET, FILM COATED ORAL at 12:56

## 2020-09-24 RX ADMIN — MORPHINE SULFATE 4 MILLIGRAM(S): 50 CAPSULE, EXTENDED RELEASE ORAL at 15:25

## 2020-09-24 RX ADMIN — Medication 81 MILLIGRAM(S): at 17:52

## 2020-09-24 RX ADMIN — NYSTATIN CREAM 1 APPLICATION(S): 100000 CREAM TOPICAL at 06:45

## 2020-09-24 RX ADMIN — Medication 25 MILLIGRAM(S): at 06:42

## 2020-09-24 RX ADMIN — CEFEPIME 100 MILLIGRAM(S): 1 INJECTION, POWDER, FOR SOLUTION INTRAMUSCULAR; INTRAVENOUS at 00:54

## 2020-09-24 RX ADMIN — ROSUVASTATIN CALCIUM 10 MILLIGRAM(S): 5 TABLET ORAL at 21:11

## 2020-09-24 RX ADMIN — CEFEPIME 100 MILLIGRAM(S): 1 INJECTION, POWDER, FOR SOLUTION INTRAMUSCULAR; INTRAVENOUS at 09:56

## 2020-09-24 RX ADMIN — CEFEPIME 100 MILLIGRAM(S): 1 INJECTION, POWDER, FOR SOLUTION INTRAMUSCULAR; INTRAVENOUS at 17:52

## 2020-09-24 RX ADMIN — ONDANSETRON 4 MILLIGRAM(S): 8 TABLET, FILM COATED ORAL at 17:57

## 2020-09-24 RX ADMIN — Medication 2: at 18:19

## 2020-09-24 RX ADMIN — DAPTOMYCIN 126 MILLIGRAM(S): 500 INJECTION, POWDER, LYOPHILIZED, FOR SOLUTION INTRAVENOUS at 01:31

## 2020-09-24 RX ADMIN — CHLORHEXIDINE GLUCONATE 1 APPLICATION(S): 213 SOLUTION TOPICAL at 11:53

## 2020-09-24 RX ADMIN — GABAPENTIN 300 MILLIGRAM(S): 400 CAPSULE ORAL at 06:42

## 2020-09-24 RX ADMIN — Medication 81 MILLIGRAM(S): at 06:42

## 2020-09-24 RX ADMIN — Medication 8 UNIT(S): at 12:54

## 2020-09-24 RX ADMIN — GABAPENTIN 300 MILLIGRAM(S): 400 CAPSULE ORAL at 21:10

## 2020-09-24 RX ADMIN — MORPHINE SULFATE 4 MILLIGRAM(S): 50 CAPSULE, EXTENDED RELEASE ORAL at 21:10

## 2020-09-24 RX ADMIN — INSULIN GLARGINE 20 UNIT(S): 100 INJECTION, SOLUTION SUBCUTANEOUS at 22:37

## 2020-09-24 NOTE — PROGRESS NOTE ADULT - PROBLEM SELECTOR PLAN 1
on broad spectrum antibiotics,   f/u intraop c/s  f/u vanco level and vanco was adjusted  I will discuss with ortho regarding the recent cultures from the right hip and is negative but the abdominal wound cultures are positive fro VREF and Pseudomonas.  He is on vanco and Rifampin  culture positive for MRSA with negative blood culture repeat but 4 bottles were positive on admission  JOHN PAUL unlikely endocarditis but might need to be treated as such.  The AICD bed is stable with no signs of infection  On Vanco and Rifampin, PICC line Monday.   hardware removed and follow on cultures which is negative to date  he is stable and he has area of necrosis over the wound and is followed by plastics  sepsis resolved and he is on antibiotic  I discussed with surgery and ID.  he is daptomycin and will follow with gallium scan  results of the hida reviewed and discussed with ID .  I discussed gallium scan with surgery and no evidence of cholecystitis.  not for surgery.  I discussed the case with ortho and explained surgery input

## 2020-09-24 NOTE — PROGRESS NOTE ADULT - SUBJECTIVE AND OBJECTIVE BOX
Interval Events: Reviewed  Patient seen and examined at bedside.    Patient is a 65y old  Male who presents with a chief complaint of R Knee Swelling (25 Sep 2020 12:30)    he is doing ok  PAST MEDICAL & SURGICAL HISTORY:  Diabetic neuropathy    STEMI (ST elevation myocardial infarction)    Diverticulitis    MRSA bacteremia    History of celiac disease    CHF (congestive heart failure)  EF ~ 25%    HTN (hypertension)    Diabetes  on insulin pump    Blood clot due to device, implant, or graft  was on blood thinners    HLD (hyperlipidemia)    Osteoarthritis    Atherosclerosis of coronary artery  CAD (coronary artery disease)    Status post percutaneous transluminal coronary angioplasty  in 2012    History of open reduction and internal fixation (ORIF) procedure    Surgery, elective  Right shoulder    Surgery, elective  right knee wound debridement    S/P TKR (total knee replacement), right  with infection Mrsa   per pt he was cleared from MRSA infection    S/P CABG x 1  2018    Stented coronary artery  10/18 heart attack  INFERIOR WALL MI    Other postprocedural status  Fixation hardware in foot LEFT        MEDICATIONS:  Pulmonary:    Antimicrobials:  DAPTOmycin IVPB 650 milliGRAM(s) IV Intermittent every 24 hours  rifAMPin 300 milliGRAM(s) Oral every 12 hours    Anticoagulants:  aspirin enteric coated 81 milliGRAM(s) Oral two times a day  prasugrel 10 milliGRAM(s) Oral daily    Cardiac:  digoxin     Tablet 0.125 milliGRAM(s) Oral daily  metoprolol succinate ER 25 milliGRAM(s) Oral daily  spironolactone 25 milliGRAM(s) Oral daily  tamsulosin 0.4 milliGRAM(s) Oral at bedtime      Allergies    ACE inhibitors (Hives)  carvedilol (Other)  enalapril (Hives)  Entresto (Other)    Intolerances        Vital Signs Last 24 Hrs  T(C): 36.3 (25 Sep 2020 11:43), Max: 36.9 (25 Sep 2020 01:05)  T(F): 97.4 (25 Sep 2020 11:43), Max: 98.5 (25 Sep 2020 01:05)  HR: 78 (25 Sep 2020 11:43) (68 - 88)  BP: 84/54 (25 Sep 2020 11:43) (84/54 - 108/55)  BP(mean): 73 (25 Sep 2020 05:53) (62 - 74)  RR: 17 (25 Sep 2020 11:43) (14 - 18)  SpO2: 94% (25 Sep 2020 11:43) (93% - 98%)    09-24 @ 07:01  -  09-25 @ 07:00  --------------------------------------------------------  IN: 1930 mL / OUT: 1990 mL / NET: -60 mL          Review of Systems:   •	General: negative  •	Skin/Breast: negative  •	Ophthalmologic: negative  •	ENMT: negative  •	Respiratory and Thorax: negative  •	Cardiovascular: negative  •	Gastrointestinal: negative  •	Genitourinary: negative  •	Musculoskeletal: negative  •	Neurological: negative  •	Psychiatric: negative  •	Hematology/Lymphatics: negative  •	Endocrine: negative  •	Allergic/Immunologic: negative    Physical Exam:   • Constitutional:	Well-developed, well nourished  • Eyes:	EOMI; PERRL; no drainage or redness  • ENMT:	No oral lesions; no gross abnormalities  • Neck	No bruits; no thyromegaly or nodules  • Breasts:	not examined  • Back:	No deformity or limitation of movement  • Respiratory:	Breath Sounds equal & clear to percussion & auscultation, no accessory muscle use  • Cardiovascular:	Regular rate & rhythm, normal S1, S2; no murmurs, gallops or rubs; no S3, S4  • Gastrointestinal:	Soft, non-tender, no hepatosplenomegaly, normal bowel sounds  • Genitourinary:	not examined  • Rectal: not examined  • Extremities:	No cyanosis, clubbing or edema  • Vascular:	Equal and normal pulses (carotid, femoral, dorsalis pedis)  • Neurologica:l	not examined  • Skin:	No lesions; no rash  • Lymph Nodes:	No lymphadedenopathy  • Musculoskeletal:	No joint pain, swelling or deformity; no limitation of movement        LABS:      CBC Full  -  ( 25 Sep 2020 07:25 )  WBC Count : 10.82 K/uL  RBC Count : 3.97 M/uL  Hemoglobin : 8.8 g/dL  Hematocrit : 30.4 %  Platelet Count - Automated : 339 K/uL  Mean Cell Volume : 76.6 fl  Mean Cell Hemoglobin : 22.2 pg  Mean Cell Hemoglobin Concentration : 28.9 gm/dL  Auto Neutrophil # : 8.13 K/uL  Auto Lymphocyte # : 0.88 K/uL  Auto Monocyte # : 1.37 K/uL  Auto Eosinophil # : 0.32 K/uL  Auto Basophil # : 0.07 K/uL  Auto Neutrophil % : 75.1 %  Auto Lymphocyte % : 8.1 %  Auto Monocyte % : 12.7 %  Auto Eosinophil % : 3.0 %  Auto Basophil % : 0.6 %    09-25    134<L>  |  102  |  18  ----------------------------<  123<H>  4.0   |  24  |  0.88    Ca    8.5      25 Sep 2020 07:25    TPro  5.7<L>  /  Alb  2.2<L>  /  TBili  0.4  /  DBili  x   /  AST  24  /  ALT  22  /  AlkPhos  109  09-25                    Culture Results:   Testing in progress (09-25 @ 00:57)      RADIOLOGY & ADDITIONAL STUDIES (The following images were personally reviewed):  Loja:                                     No  Urine output:                       adequate  DVT prophylaxis:                 Yes  Flattus:                                  Yes  Bowel movement:              No

## 2020-09-24 NOTE — PROGRESS NOTE ADULT - ATTENDING COMMENTS
Patient seen and examined with house-staff during bedside rounds.  Resident note read, including vitals, physical findings, laboratory data, and radiological reports.   Revisions included below.  Direct personal management at bed side and extensive interpretation of the data.  Plan was outlined and discussed in details with the housestaff.  Decision making of high complexity  Action taken for acute disease activity to reflect the level of care provided:  - medication reconciliation  - review laboratory datap    Na increased

## 2020-09-24 NOTE — PROGRESS NOTE ADULT - SUBJECTIVE AND OBJECTIVE BOX
Patient is a 65y old  Male who presents with a chief complaint of R Knee Swelling (24 Sep 2020 08:51)      INTERVAL HPI/ OVERNIGHT EVENTS: No overnight events reported. Pt seen and examined at bedside. The patient states that he is in a better mood today and feels better. He denies any N/V/F.       LABS                        8.4    11.30 )-----------( 300      ( 24 Sep 2020 06:55 )             29.6     09-24    134<L>  |  102  |  18  ----------------------------<  108<H>  4.0   |  21<L>  |  0.94    Ca    8.1<L>      24 Sep 2020 06:55  Phos  2.8     09-23  Mg     1.8     09-23    TPro  6.1  /  Alb  2.4<L>  /  TBili  0.5  /  DBili  x   /  AST  26  /  ALT  24  /  AlkPhos  112  09-24      ESR: 47  CRP: --  09-24 @ 06:55    ICU Vital Signs Last 24 Hrs  T(C): 37.2 (24 Sep 2020 06:04), Max: 37.2 (23 Sep 2020 21:48)  T(F): 99 (24 Sep 2020 06:04), Max: 99 (23 Sep 2020 21:48)  HR: 92 (24 Sep 2020 06:04) (84 - 92)  BP: 90/55 (24 Sep 2020 06:04) (90/55 - 120/55)  BP(mean): --  ABP: --  ABP(mean): --  RR: 18 (24 Sep 2020 06:04) (16 - 20)  SpO2: 97% (24 Sep 2020 06:04) (95% - 99%)      RADIOLOGY  < from: Xray Foot AP + Lateral, Bilateral (09.16.20 @ 14:16) >  Examination of the bilateral feet demonstrates severe arthrosis in the right first PIP joint, with valgus deformity. Calcaneal spurs no fractures. Internal fixation in the left first PIP joint. Soft tissue swelling in the plantar aspect of the left midfoot, without associated adjacent osseous abnormality. No fractures.    < end of copied text >      MICROBIOLOGY    PHYSICAL EXAM  Lower Extremity Focused  Vasc: DP/PT 1-4 b/l, +2 nisa malleolar and dorsum of the foot pitting edema b/l, CFT <3sec b/l  Derm: Right foot - 2nd digit ulcer at tip of toe, no drainage, no malodor, hallux maceration improving; small circular lesions present throughout his leg d/t to injuries related to his cast, Tinea Pedia b/l, onychomycosis. L foot: ulcer on the distal plantar aspect of the 2nd digit  Neuro: Protective sensations not intact from the distal tibia down (stocking distribution)  MSK: Partial amp of digits 2 and 3 on L foot, hammertoes on digits 2-5 on R foot, Hallux rigidus b/l 98.6

## 2020-09-24 NOTE — PROGRESS NOTE ADULT - SUBJECTIVE AND OBJECTIVE BOX
Ortho Note    Pt comfortable without complaints, pain controlled  Denies CP, SOB, N/V, numbness/tingling. Has been refusing tele, but   NAD noted.     Vital Signs Last 24 Hrs      AVSS    RLE:  DSG: R knee Wound vac holding suction at 125, MIS x1,  in KI, proximal donor site covered in tegaderm; bandaid on R foot debridement site CDI (podiatry dsg)  Pulses: +DP;  feet cool to touch; at baseline; no edema  Sensation: Sensation intact to baseline (diminished sensation- h/o severe neuropathy; follows with vascular Dr. Malloy outpatient)  Motor: 5/5 EHL/FHL; limiting dorsi and plantar flexion on R as per plastics    LLE:  DSG: Wound vac holding suction at 125; tegaderm to proximal donor site CDI; kerlix to L foot debridement site CDI  Pulses: feet cool to touch; at baseline; no edema  Sensation: SILT to baseline (diminished sensation- h/o severe neuropathy; follows with vascular Dr. Malloy outpatient)  Motor: 5/5 EHL/FHL/TA/GS                          8.4    11.30 )-----------( 300      ( 24 Sep 2020 06:55 )             29.6   24 Sep 2020 06:55    134    |  102    |  18     ----------------------------<  108    4.0     |  21     |  0.94     Ca    8.1        24 Sep 2020 06:55  Phos  2.8       23 Sep 2020 08:28  Mg     1.8       23 Sep 2020 08:28    TPro  6.1    /  Alb  2.4    /  TBili  0.5    /  DBili  x      /  AST  26     /  ALT  24     /  AlkPhos  112    24 Sep 2020 06:55    A/P: 65yMale admitted for R knee pain, drainage s/p right knee arthroscopic I+D 7/17, right knee explant and abx spacer 7/22 with Dr. Castro;  and debridement of LLE wound with wound vac placement on 7/30 by Dr. Bowen. Was original d/c'd home on 8/10/20 with PICC and vancomycin IV q12h + rifampin.  Readmitted, and now s/p R hip exchange of hardware on 09/04/2020; I+D of RUQ on 09/04/2020; R Knee Revision Antibiotic Spacer by Dr. Castro, R Knee medial gastroc flap and LLE STSG by Dr. Mckeon on 9/18  - afebrile since 9/17/20 when workup for sepsis was negative (BC NGTD, elevated WBC resolved; surgical wounds did not appear infected; UA negative)  - ESR/CRP decreasing from Tuesday (72h post-op) as expected. Follow-up ESR, CRP q48h (next Saturday 9/26)  - diabetes mellitus II with hyperglycemia- Sugars better controlled after adding back lantus 20u at bedtime and 8u pre-meal; continue current regimen and appreciate continued internal medicine recs  - protein calorie malnutrition/ uncontrolled diabetes- Nutrition services consulted, appreciate recs; continue glucerna shakes  - hyponatremia- stable at 134 s/p 2 days of fluid restriction on 9/21-9/22; no further need for fluid restriction at this time; asymptomatic  - H+H 8.4/29.6- S/p 3units PRBCs in OR on Friday 9/19 (H+H day of surgery 9.2/33.0), and close ICU monitoring. Currently asymptomatic and at baseline of Hgb levels from prior admissions. Continue to monitor labs; transfuse for Hgb < 8.0.   - Pain Control- appreciate pain management recs; Avoiding long-term NSAIDs in the setting of CAD. Avoiding standing tylenol in the setting of elevated liver enzymes in the past while on rifampin  - DVT ppx: ASA bid+ effient (home med)  - PT: Strict bed rest and PT to be held until Friday 9/25 as per Plastics. Bed exercises OK. No dorsiflexion or plantar flexion of RLE. Patient can resume PT on Friday 9/25 after seen by plastics. WBS- TTWB RLE in KI, WBAT LLE.  - aggressive I/S, bowel regimen (reports regular BMs since surgery 9/18)  - appreciate ID recs- Continue dapto, cefepime, rifampin; f/u CPK with dapto on 9/28 (elevated at 209 today from 60 on 9/14); f/o CBC with diff and CMP (elevated liver enzymes in past with rifampin);  - appreciate internal medicine, and cardiology recs   - appreciate plastics recs- Wound vacs on LLE, and R knee to be continued until Friday 9/25; will be removed and changed to adaptic/gauze. Continue MIS until output <20cc /24h.  - RUQ drainage-  thick yellow drainage from RUQ I+D site. Still denies abd pain, N/V. No erythema at site. Drainage remains the same consistency and appearance since Monday 9/21. Cultures were sent and resulted negative. Past RUQ cultures +pseudomonas. Repeat cultures of RUQ drainage sent today, will follow-up results.  Gen surgery team 4 following. Ultrasound ordered as per gen surg team done 9/22 inconclusive. CT of abdomen ordered as per gen surg recs; continue to follow-up recs from Gen Surg. Will need cholecystectomy in near future as per gen surg.  - RUE swelling. No erythema or drainage. RUE Doppler negative for DVT 9/23. Swelling likely due to infiltrate from accidental PICC line removal on 9/22.  - appreciate psych recs- currently feels well on regimen today, will reconsult as needed  - R diabetic foot ulcers x 2- debrided 9/16 by podiatry; L ulcer debrided  9/22- dsg changes and care as per podiatry  - dispo: pending medical stabilization/ OOB status Friday 9/25; likely PINKY    Ortho Pager [phone] Ortho Note    Pt comfortable without complaints, pain controlled  Denies CP, SOB, N/V, numbness/tingling. Has been refusing tele, but   NAD noted.     Vital Signs Last 24 Hrs      AVSS    RLE:  DSG: R knee Wound vac holding suction at 125, MIS x1,  in KI, proximal donor site covered in tegaderm; bandaid on R foot debridement site CDI (podiatry dsg)  Pulses: +DP;  feet cool to touch; at baseline; no edema  Sensation: Sensation intact to baseline (diminished sensation- h/o severe neuropathy; follows with vascular Dr. Malloy outpatient)  Motor: 5/5 EHL/FHL; limiting dorsi and plantar flexion on R as per plastics    LLE:  DSG: Wound vac holding suction at 125; tegaderm to proximal donor site CDI; kerlix to L foot debridement site CDI  Pulses: feet cool to touch; at baseline; no edema  Sensation: SILT to baseline (diminished sensation- h/o severe neuropathy; follows with vascular Dr. Malloy outpatient)  Motor: 5/5 EHL/FHL/TA/GS                          8.4    11.30 )-----------( 300      ( 24 Sep 2020 06:55 )             29.6   24 Sep 2020 06:55    134    |  102    |  18     ----------------------------<  108    4.0     |  21     |  0.94     Ca    8.1        24 Sep 2020 06:55  Phos  2.8       23 Sep 2020 08:28  Mg     1.8       23 Sep 2020 08:28    TPro  6.1    /  Alb  2.4    /  TBili  0.5    /  DBili  x      /  AST  26     /  ALT  24     /  AlkPhos  112    24 Sep 2020 06:55    A/P: 65yMale admitted for R knee pain, drainage s/p right knee arthroscopic I+D 7/17, right knee explant and abx spacer 7/22 with Dr. Castro;  and debridement of LLE wound with wound vac placement on 7/30 by Dr. Bowen. Was original d/c'd home on 8/10/20 with PICC and vancomycin IV q12h + rifampin.  Readmitted, and now s/p R hip exchange of hardware on 09/04/2020; I+D of RUQ on 09/04/2020; R Knee Revision Antibiotic Spacer by Dr. Castro, R Knee medial gastroc flap and LLE STSG by Dr. Mckeon on 9/18  - afebrile since 9/17/20 when workup for sepsis was negative (BC NGTD, elevated WBC resolved; surgical wounds did not appear infected; UA negative)  - ESR/CRP decreasing from Tuesday (72h post-op) as expected. Follow-up ESR, CRP q48h (next Saturday 9/26). Continue to follow-up CBC with diff, CMP (h/o elevated liver enzymes on rifampin).  - diabetes mellitus II with hyperglycemia- Sugars better controlled after adding back lantus 20u at bedtime and 8u pre-meal; continue current regimen and appreciate continued internal medicine recs  - protein calorie malnutrition/ uncontrolled diabetes- Nutrition services consulted, appreciate recs; continue glucerna shakes  - hyponatremia- stable at 134 s/p 2 days of fluid restriction on 9/21-9/22; no further need for fluid restriction at this time; asymptomatic  - H+H 8.4/29.6- S/p 3units PRBCs in OR on Friday 9/19 (H+H day of surgery 9.2/33.0), and close ICU monitoring. Currently asymptomatic and at baseline of Hgb levels from prior admissions. Continue to monitor labs; transfuse for Hgb < 8.0.   - Pain Control- appreciate pain management recs; Avoiding long-term NSAIDs in the setting of CAD. Avoiding standing tylenol in the setting of elevated liver enzymes in the past while on rifampin  - DVT ppx: ASA bid+ effient (home med)  - PT: Strict bed rest and PT to be held until Friday 9/25 as per Plastics. Bed exercises OK. No dorsiflexion or plantar flexion of RLE. Patient can resume PT on Friday 9/25 after seen by plastics. WBS- TTWB RLE in KI, WBAT LLE.  - aggressive I/S, bowel regimen (reports regular BMs since surgery 9/18)  - appreciate ID recs- Continue dapto, cefepime, rifampin; f/u CPK with dapto on 9/28 (elevated at 209 today from 60 on 9/14); f/o CBC with diff and CMP (elevated liver enzymes in past with rifampin);  - appreciate internal medicine, and cardiology recs   - appreciate plastics recs- Wound vacs on LLE, and R knee to be continued until Friday 9/25; will be removed and changed to adaptic/gauze. Continue MIS until output <20cc /24h.  - RUQ drainage-  thick yellow drainage from RUQ I+D site. Still denies abd pain, N/V. No erythema at site. Drainage remains the same consistency and appearance since Monday 9/21. Cultures were sent and resulted negative. Past RUQ cultures +pseudomonas. Repeat cultures of RUQ drainage sent today, will follow-up results.  Gen surgery team 4 following. Ultrasound ordered as per gen surg team done 9/22 inconclusive. CT of abdomen ordered as per gen surg recs; continue to follow-up recs from Gen Surg. Will need cholecystectomy in near future as per gen surg.  - RUE swelling. No erythema or drainage. RUE Doppler negative for DVT 9/23. Swelling likely due to infiltrate from accidental PICC line removal on 9/22.  - appreciate psych recs- currently feels well on regimen today, will reconsult as needed  - R diabetic foot ulcers x 2- debrided 9/16 by podiatry; L ulcer debrided  9/22- dsg changes and care as per podiatry  - dispo: pending medical stabilization/ OOB status Friday 9/25; likely PINKY    Ortho Pager [phone]

## 2020-09-24 NOTE — PROGRESS NOTE ADULT - SUBJECTIVE AND OBJECTIVE BOX
Pt seen and examined.   Complains of pain and feeling depressed.     Exam:  AVSS. NAD.   Skin graft donor site clean.   Right knee and left leg with VAC in place.   Right posterior calf c/d/i.   Drain 100mL/yesterday

## 2020-09-24 NOTE — PROGRESS NOTE ADULT - ASSESSMENT
65M w/ PMHx of CAD s/p CABG, CHF, DM, HLD, HTN, acute cholecystitis s/p perc sudhir 2/28 (drain removed in late May 2020), recent R TKA 7/22 presented to [facility] with R knee infection s/p R knee hardware exchange on 9/4 and R knee spacer w/ flap from plastics on 9/18.    We did bedside I&D of the RUQ on 09/1/2020       General surgery re-consulted for continued purulent drainage coming from his prior MIS site.     Patient has no pain, his abdominal exam is benign not a much discharge was seen, but was thinner than before, mixed with serous fluid  Had vomited twice last night    Afebrile, HDS. WBC 11.3 from 10.3 Normal LFTs RUQ ultrasound showed cholelithiasis    On cefepime and daptomycin by ID    Yesterday CT A/P was done and showed  Interval decrease in extent of trace fluid around the GB      Discussed with Dr. Latif and Chief resident:      - Daily dressing  - Surgery team 4 will continue to FU

## 2020-09-24 NOTE — PROGRESS NOTE ADULT - ATTENDING COMMENTS
I have reviewed the medical record, including laboratory and radiographic studies, interviewed and examined the patient and discussed the plan with Dr. Pink, the ID Resident.  Agree with above. After cleaning RUQ wound with chlorhexidine, expressed yellow purulent, somewhat mucoid liquid - sent for culture.  Will continue to follow with you – ID Team 1.

## 2020-09-24 NOTE — PROGRESS NOTE ADULT - SUBJECTIVE AND OBJECTIVE BOX
FLOWER MARISCAL  [id number]    Subjective:  No o/n events       Objective:  T(C): 37.2 (09-24-20 @ 06:04), Max: 37.2 (09-23-20 @ 21:48)  HR: 92 (09-24-20 @ 06:04) (81 - 92)  BP: 90/55 (09-24-20 @ 06:04) (90/55 - 120/55)  RR: 18 (09-24-20 @ 06:04) (15 - 20)  SpO2: 97% (09-24-20 @ 06:04) (95% - 99%)  Wt(kg): --   09-23    133<L>  |  103  |  20  ----------------------------<  188<H>  3.8   |  22  |  0.92    Ca    8.2<L>      23 Sep 2020 08:28  Phos  2.8     09-23  Mg     1.8     09-23    TPro  5.5<L>  /  Alb  2.1<L>  /  TBili  0.5  /  DBili  x   /  AST  21  /  ALT  23  /  AlkPhos  110  09-23                        8.4    10.37 )-----------( 278      ( 23 Sep 2020 08:28 )             28.9       09-23 @ 07:01  -  09-24 @ 07:00  --------------------------------------------------------  IN: 1030 mL / OUT: 1210 mL / NET: -180 mL      PHYSICAL EXAM:    General: NAD, Lying in bed in pain  Extrem: RLE wound vac in place holding suction.  HMV/MIS SS output.  Left shin VAc in place holding suction.            MEDICATIONS  (STANDING):  aspirin enteric coated 81 milliGRAM(s) Oral two times a day  cefepime   IVPB 2000 milliGRAM(s) IV Intermittent every 8 hours  chlorhexidine 2% Cloths 1 Application(s) Topical <User Schedule>  DAPTOmycin IVPB 650 milliGRAM(s) IV Intermittent every 24 hours  dextrose 5%. 1000 milliLiter(s) (50 mL/Hr) IV Continuous <Continuous>  dextrose 50% Injectable 12.5 Gram(s) IV Push once  dextrose 50% Injectable 25 Gram(s) IV Push once  dextrose 50% Injectable 25 Gram(s) IV Push once  digoxin     Tablet 0.125 milliGRAM(s) Oral daily  gabapentin 300 milliGRAM(s) Oral three times a day  insulin glargine Injectable (LANTUS) 20 Unit(s) SubCutaneous at bedtime  insulin lispro (HumaLOG) corrective regimen sliding scale   SubCutaneous Before meals and at bedtime  insulin lispro Injectable (HumaLOG) 8 Unit(s) SubCutaneous three times a day before meals  metoprolol succinate ER 25 milliGRAM(s) Oral daily  nystatin Cream 1 Application(s) Topical two times a day  polyethylene glycol 3350 17 Gram(s) Oral daily  prasugrel 10 milliGRAM(s) Oral daily  rifAMPin 300 milliGRAM(s) Oral every 12 hours  rosuvastatin 10 milliGRAM(s) Oral at bedtime  sertraline 25 milliGRAM(s) Oral daily  spironolactone 25 milliGRAM(s) Oral daily  tamsulosin 0.4 milliGRAM(s) Oral at bedtime    MEDICATIONS  (PRN):  cyclobenzaprine 10 milliGRAM(s) Oral three times a day PRN Muscle Spasm  dextrose 40% Gel 15 Gram(s) Oral once PRN Blood Glucose LESS THAN 70 milliGRAM(s)/deciliter  diphenhydrAMINE 50 milliGRAM(s) Oral every 4 hours PRN Rash and/or Itching  glucagon  Injectable 1 milliGRAM(s) IntraMuscular once PRN Glucose LESS THAN 70 milligrams/deciliter  morphine  - Injectable 4 milliGRAM(s) IV Push every 4 hours PRN breakthrough pain  morphine  IR 15 milliGRAM(s) Oral every 4 hours PRN Moderate Pain (4 - 6)  morphine  IR 30 milliGRAM(s) Oral every 4 hours PRN Severe Pain (7 - 10)  naloxone Injectable 0.1 milliGRAM(s) IV Push every 3 minutes PRN For ANY of the following changes in patient status:  A. RR LESS THAN 10 breaths per minute, B. Oxygen saturation LESS THAN 90%, C. Sedation score of 6  ondansetron Injectable 4 milliGRAM(s) IV Push every 6 hours PRN Nausea  senna 2 Tablet(s) Oral at bedtime PRN Constipation  sodium chloride 0.9% lock flush 10 milliLiter(s) IV Push every 1 hour PRN Pre/post blood products, medications, blood draw, and to maintain line patency

## 2020-09-24 NOTE — PROGRESS NOTE ADULT - SUBJECTIVE AND OBJECTIVE BOX
The patient said he vomited twice yesterday after a brief nausea, denies fever, chills or abdominal pain, still having discharge from [facility] [[email or web address] but less than yesterday.  Underwent CT scan, abdomen and pelvis yesterday  AVSS    MEDICATIONS  (STANDING):  aspirin enteric coated 81 milliGRAM(s) Oral two times a day  cefepime   IVPB 2000 milliGRAM(s) IV Intermittent every 8 hours  chlorhexidine 2% Cloths 1 Application(s) Topical <User Schedule>  DAPTOmycin IVPB 650 milliGRAM(s) IV Intermittent every 24 hours  dextrose 5%. 1000 milliLiter(s) (50 mL/Hr) IV Continuous <Continuous>  dextrose 50% Injectable 12.5 Gram(s) IV Push once  dextrose 50% Injectable 25 Gram(s) IV Push once  dextrose 50% Injectable 25 Gram(s) IV Push once  digoxin     Tablet 0.125 milliGRAM(s) Oral daily  gabapentin 300 milliGRAM(s) Oral three times a day  insulin glargine Injectable (LANTUS) 20 Unit(s) SubCutaneous at bedtime  insulin lispro (HumaLOG) corrective regimen sliding scale   SubCutaneous Before meals and at bedtime  insulin lispro Injectable (HumaLOG) 8 Unit(s) SubCutaneous three times a day before meals  metoprolol succinate ER 25 milliGRAM(s) Oral daily  nystatin Cream 1 Application(s) Topical two times a day  polyethylene glycol 3350 17 Gram(s) Oral daily  prasugrel 10 milliGRAM(s) Oral daily  rifAMPin 300 milliGRAM(s) Oral every 12 hours  rosuvastatin 10 milliGRAM(s) Oral at bedtime  sertraline 25 milliGRAM(s) Oral daily  spironolactone 25 milliGRAM(s) Oral daily  tamsulosin 0.4 milliGRAM(s) Oral at bedtime    MEDICATIONS  (PRN):  cyclobenzaprine 10 milliGRAM(s) Oral three times a day PRN Muscle Spasm  dextrose 40% Gel 15 Gram(s) Oral once PRN Blood Glucose LESS THAN 70 milliGRAM(s)/deciliter  diphenhydrAMINE 50 milliGRAM(s) Oral every 4 hours PRN Rash and/or Itching  glucagon  Injectable 1 milliGRAM(s) IntraMuscular once PRN Glucose LESS THAN 70 milligrams/deciliter  morphine  - Injectable 4 milliGRAM(s) IV Push every 4 hours PRN breakthrough pain  morphine  IR 15 milliGRAM(s) Oral every 4 hours PRN Moderate Pain (4 - 6)  morphine  IR 30 milliGRAM(s) Oral every 4 hours PRN Severe Pain (7 - 10)  naloxone Injectable 0.1 milliGRAM(s) IV Push every 3 minutes PRN For ANY of the following changes in patient status:  A. RR LESS THAN 10 breaths per minute, B. Oxygen saturation LESS THAN 90%, C. Sedation score of 6  ondansetron Injectable 4 milliGRAM(s) IV Push every 6 hours PRN Nausea  senna 2 Tablet(s) Oral at bedtime PRN Constipation  sodium chloride 0.9% lock flush 10 milliLiter(s) IV Push every 1 hour PRN Pre/post blood products, medications, blood draw, and to maintain line patency    Vital Signs Last 24 Hrs  T(C): 36.6 (24 Sep 2020 18:34), Max: 37.2 (23 Sep 2020 21:48)  T(F): 97.8 (24 Sep 2020 18:34), Max: 99 (23 Sep 2020 21:48)  HR: 68 (24 Sep 2020 18:34) (68 - 92)  BP: 97/53 (24 Sep 2020 18:34) (90/55 - 106/49)  BP(mean): --  RR: 18 (24 Sep 2020 18:34) (16 - 20)  SpO2: 98% (24 Sep 2020 18:34) (95% - 99%)                            8.4    11.30 )-----------( 300      ( 24 Sep 2020 06:55 )             29.6       09-24    134<L>  |  102  |  18  ----------------------------<  108<H>  4.0   |  21<L>  |  0.94    Ca    8.1<L>      24 Sep 2020 06:55  Phos  2.8     09-23  Mg     1.8     09-23    TPro  6.1  /  Alb  2.4<L>  /  TBili  0.5  /  DBili  x   /  AST  26  /  ALT  24  /  AlkPhos  112  09-24    < from: CT Abdomen and Pelvis w/ IV Cont (09.23.20 @ 19:08) >  FINDINGS:    Lower chest: New small to moderate right pleural effusion with passive atelectasis and trace left pleural effusion. Stable scattered few nodules in both lower lungs.    Liver: Normal in size and morphology. No focal abnormality. The main portal vein is patent.  Gallbladder and biliary ducts: Status post cholecystectomy. Interval decrease in extent of slightly hyperdense fluid in the gallbladder fossa (3:39). Tubular focal fat stranding along the prior cutaneous drainage is again noted in the right upper lateral abdominal wall (3:35), with interval decrease in extent compared to prior study. No intra/extrahepatic biliary ductal dilatation.  Spleen: Within normal limits.  Pancreas: Within normal limits.  Adrenals: Within normal limits.  Kidneys/ureters: No hydronephrosis. No urinary calculi. Persistent geographic area of subtle cortical hypoenhancement in the left posterior kidney, however less pronounced compared to prior study. Again noted nonobstructing renal stones. Left extrarenal pelvis. No hydronephrosis.    Bladder: Small intravesical air in the anterior aspect, likely from prior instrumentation.  Reproductive organs: Prostate is within normal limits. Diffuse calcification along bilateral vas deferens.    Bowel: The bowel is normal in caliber. Moderatestool burden throughout the colon.  Peritoneum/retroperitoneum: No ascites.  Vasculature:  The aorta and its branches are normal in caliber. Moderate calcified plaques along the aorta and its branches.  Lymph nodes: Lymph nodes are not enlarged.  Bones and soft tissue: Anasarca, increased in extent compared to prior study.      IMPRESSION:  1.  No evidence of intraperitoneal abscess.  2.  Interval decrease in extent of trace fluid in the cholecystectomy bed and postprocedural change along the percutaneous drainage site.  3.  Persistent, however slightly improved focal cortical hypoenhancement in the left posterior kidney, may reflect improving infectious/inflammatory process.  4.  Anasarca. New bilateral pleural effusion, right greater than the left.      < end of copied text >     The patient said he vomited twice yesterday after a brief nausea, denies fever, chills or abdominal pain, still having discharge from [facility] but less than yesterday.  Underwent CT scan, abdomen and pelvis yesterday  AVSS    MEDICATIONS  (STANDING):  aspirin enteric coated 81 milliGRAM(s) Oral two times a day  cefepime   IVPB 2000 milliGRAM(s) IV Intermittent every 8 hours  chlorhexidine 2% Cloths 1 Application(s) Topical <User Schedule>  DAPTOmycin IVPB 650 milliGRAM(s) IV Intermittent every 24 hours  dextrose 5%. 1000 milliLiter(s) (50 mL/Hr) IV Continuous <Continuous>  dextrose 50% Injectable 12.5 Gram(s) IV Push once  dextrose 50% Injectable 25 Gram(s) IV Push once  dextrose 50% Injectable 25 Gram(s) IV Push once  digoxin     Tablet 0.125 milliGRAM(s) Oral daily  gabapentin 300 milliGRAM(s) Oral three times a day  insulin glargine Injectable (LANTUS) 20 Unit(s) SubCutaneous at bedtime  insulin lispro (HumaLOG) corrective regimen sliding scale   SubCutaneous Before meals and at bedtime  insulin lispro Injectable (HumaLOG) 8 Unit(s) SubCutaneous three times a day before meals  metoprolol succinate ER 25 milliGRAM(s) Oral daily  nystatin Cream 1 Application(s) Topical two times a day  polyethylene glycol 3350 17 Gram(s) Oral daily  prasugrel 10 milliGRAM(s) Oral daily  rifAMPin 300 milliGRAM(s) Oral every 12 hours  rosuvastatin 10 milliGRAM(s) Oral at bedtime  sertraline 25 milliGRAM(s) Oral daily  spironolactone 25 milliGRAM(s) Oral daily  tamsulosin 0.4 milliGRAM(s) Oral at bedtime    MEDICATIONS  (PRN):  cyclobenzaprine 10 milliGRAM(s) Oral three times a day PRN Muscle Spasm  dextrose 40% Gel 15 Gram(s) Oral once PRN Blood Glucose LESS THAN 70 milliGRAM(s)/deciliter  diphenhydrAMINE 50 milliGRAM(s) Oral every 4 hours PRN Rash and/or Itching  glucagon  Injectable 1 milliGRAM(s) IntraMuscular once PRN Glucose LESS THAN 70 milligrams/deciliter  morphine  - Injectable 4 milliGRAM(s) IV Push every 4 hours PRN breakthrough pain  morphine  IR 15 milliGRAM(s) Oral every 4 hours PRN Moderate Pain (4 - 6)  morphine  IR 30 milliGRAM(s) Oral every 4 hours PRN Severe Pain (7 - 10)  naloxone Injectable 0.1 milliGRAM(s) IV Push every 3 minutes PRN For ANY of the following changes in patient status:  A. RR LESS THAN 10 breaths per minute, B. Oxygen saturation LESS THAN 90%, C. Sedation score of 6  ondansetron Injectable 4 milliGRAM(s) IV Push every 6 hours PRN Nausea  senna 2 Tablet(s) Oral at bedtime PRN Constipation  sodium chloride 0.9% lock flush 10 milliLiter(s) IV Push every 1 hour PRN Pre/post blood products, medications, blood draw, and to maintain line patency    Vital Signs Last 24 Hrs  T(C): 36.6 (24 Sep 2020 18:34), Max: 37.2 (23 Sep 2020 21:48)  T(F): 97.8 (24 Sep 2020 18:34), Max: 99 (23 Sep 2020 21:48)  HR: 68 (24 Sep 2020 18:34) (68 - 92)  BP: 97/53 (24 Sep 2020 18:34) (90/55 - 106/49)  BP(mean): --  RR: 18 (24 Sep 2020 18:34) (16 - 20)  SpO2: 98% (24 Sep 2020 18:34) (95% - 99%)    Gen: NAD, resting comfortably in bed  Pulm: Good inspiratory effort, nonlabored breathing  C/V: NSR  Abd: Soft, non tender, nondistended. No rebound, no guarding. RUQ some discharge is seen that is thinner than before mixed with serous secretion and not excessive, the area around in non erythematous nor tender  Extrem: WWP, no edema                        8.4    11.30 )-----------( 300      ( 24 Sep 2020 06:55 )             29.6       09-24    134<L>  |  102  |  18  ----------------------------<  108<H>  4.0   |  21<L>  |  0.94    Ca    8.1<L>      24 Sep 2020 06:55  Phos  2.8     09-23  Mg     1.8     09-23    TPro  6.1  /  Alb  2.4<L>  /  TBili  0.5  /  DBili  x   /  AST  26  /  ALT  24  /  AlkPhos  112  09-24    < from: CT Abdomen and Pelvis w/ IV Cont (09.23.20 @ 19:08) >  FINDINGS:    Lower chest: New small to moderate right pleural effusion with passive atelectasis and trace left pleural effusion. Stable scattered few nodules in both lower lungs.    Liver: Normal in size and morphology. No focal abnormality. The main portal vein is patent.  Gallbladder and biliary ducts: Status post cholecystectomy. Interval decrease in extent of slightly hyperdense fluid in the gallbladder fossa (3:39). Tubular focal fat stranding along the prior cutaneous drainage is again noted in the right upper lateral abdominal wall (3:35), with interval decrease in extent compared to prior study. No intra/extrahepatic biliary ductal dilatation.  Spleen: Within normal limits.  Pancreas: Within normal limits.  Adrenals: Within normal limits.  Kidneys/ureters: No hydronephrosis. No urinary calculi. Persistent geographic area of subtle cortical hypoenhancement in the left posterior kidney, however less pronounced compared to prior study. Again noted nonobstructing renal stones. Left extrarenal pelvis. No hydronephrosis.    Bladder: Small intravesical air in the anterior aspect, likely from prior instrumentation.  Reproductive organs: Prostate is within normal limits. Diffuse calcification along bilateral vas deferens.    Bowel: The bowel is normal in caliber. Moderatestool burden throughout the colon.  Peritoneum/retroperitoneum: No ascites.  Vasculature:  The aorta and its branches are normal in caliber. Moderate calcified plaques along the aorta and its branches.  Lymph nodes: Lymph nodes are not enlarged.  Bones and soft tissue: Anasarca, increased in extent compared to prior study.      IMPRESSION:  1.  No evidence of intraperitoneal abscess.  2.  Interval decrease in extent of trace fluid in the cholecystectomy bed and postprocedural change along the percutaneous drainage site.  3.  Persistent, however slightly improved focal cortical hypoenhancement in the left posterior kidney, may reflect improving infectious/inflammatory process.  4.  Anasarca. New bilateral pleural effusion, right greater than the left.      < end of copied text >

## 2020-09-24 NOTE — PROGRESS NOTE ADULT - SUBJECTIVE AND OBJECTIVE BOX
Ortho Note    Pt comfortable this AM, complained of some pain overnight but has since improved  Denies CP, SOB, N/V, numbness/tingling     Vital Signs Last 24 Hrs  T(C): 37.2 (24 Sep 2020 06:04), Max: 37.2 (23 Sep 2020 21:48)  T(F): 99 (24 Sep 2020 06:04), Max: 99 (23 Sep 2020 21:48)  HR: 92 (24 Sep 2020 06:04) (81 - 92)  BP: 90/55 (24 Sep 2020 06:04) (90/55 - 120/55)  BP(mean): --  RR: 18 (24 Sep 2020 06:04) (15 - 20)  SpO2: 97% (24 Sep 2020 06:04) (95% - 99%)    RLE:  DSG: R knee Wound vac holding suction at 125, MIS x1, in KI, proximal donor site uncovered, healing ; kerlix to R foot debridement site (podiatry dsg)  Pulses: +DP;  feet cool to touch; at baseline; no edema  Sensation: Sensation intact to baseline (diminished sensation- h/o severe neuropathy; follows with vascular Dr. Malloy outpatient)  Motor: 5/5 EHL/FHL; limiting dorsi and plantar flexion on R as per plastics    LLE:  DSG: Wound vac holding suction at 125; tegaderm to proximal donor site CDI; kerlix to L foot debridement site CDI  Pulses: feet cool to touch; at baseline; no edema  Sensation: SILT to baseline (diminished sensation- h/o severe neuropathy; follows with vascular Dr. Malloy outpatient)  Motor: 5/5 EHL/FHL/TA/GS                        8.4    10.37 )-----------( 278      ( 23 Sep 2020 08:28 )             28.9   23 Sep 2020 08:28    133    |  103    |  20     ----------------------------<  188    3.8     |  22     |  0.92     Ca    8.2        23 Sep 2020 08:28  Phos  2.8       23 Sep 2020 08:28  Mg     1.8       23 Sep 2020 08:28    TPro  5.5    /  Alb  2.1    /  TBili  0.5    /  DBili  x      /  AST  21     /  ALT  23     /  AlkPhos  110    23 Sep 2020 08:28      A/P: 65yMale admitted for R knee pain, drainage s/p right knee arthroscopic I+D 7/17, right knee explant and abx spacer 7/22 with Dr. Castro;  and debridement of LLE wound with wound vac placement on 7/30 by Dr. Bowen. Was original d/c'd home on 8/10/20 with PICC and vancomycin IV q12h + rifampin.  Readmitted, and now s/p R hip exchange of hardware on 09/04/2020; I+D of RUQ on 09/04/2020; R Knee Revision Antibiotic Spacer by Dr. Castro, R Knee medial gastroc flap and LLE STSG by Dr. Mckeon on 9/18  - afebrile since 9/17/20 when workup for sepsis was negative (BC NGTD, elevated WBC resolved; surgical wounds did not appear infected; UA negative)  -ESR/CRP elevated yesterday which was expected 72h post-op. Will f/u repeat ESR, CRP on Thursday 9/24 AM.   - diabetes mellitus II with hyperglycemia- Patient has been on sliding scale post-op through the weekend with elevated sugars (250-350). Was well-controlled pre-op. Sugars better controlled since placed on lantus and pre-meal insulin yesterday  - protein calorie malnutrition/ uncontrolled diabetes- Nutrition services consulted, appreciate recs; continue glucerna shakes  - Hypophosphatemia-resolved, 2.8 today after oral supplementation  - hyponatremia- stable at 133, 134; fluid restriction discountinued as per internal medicine recs  - H+H 8.4/28.9- S/p 3units PRBCs in OR on Friday 9/19 (H+H day of surgery 9.2/33.0), and close ICU monitoring. Currently asymptomatic and at baseline of Hgb levels from prior admissions. Continue to monitor labs; transfuse for Hgb < 8.0.   - Pain Control- appreciate pain management recs, gabapentin increased to tid; Avoiding long-term NSAIDs in the setting of CAD. Avoiding standing tylenol in the setting of elevated liver enzymes in the past while on rifampin  - DVT ppx: ASA bid+ effient (home med)  - PT, WBS: TTWB RLE; WBAT LLE; Strict bed rest and PT to be held until Friday 9/25 as per Plastics. Bed exercises OK. No dorsiflexion or plantar flexion of RLE  - aggressive I/S, bowel regimen (reports regular BMs since surgery 9/18)  - appreciate ID recs- Continue dapto, cefepime, rifampin; f/u CPK with dapto on 9/28 (elevated at 209 today from 60 on 9/14); f/o CBC with diff and CMP (elevated liver enzymes in past with rifampin);  - appreciate internal medicine, and cardiology recs   - appreciate plastics recs- Wound vacs on LLE, and R knee to be continued until Friday (possible wound vac changes/ discontinuation Friday 9/25); continue MIS x 1 for now and monitor output.   - RUQ drainage-  thick yellow drainage from RUQ I+D site. Still denies abd pain, N/V. No eythema at site. Drainage remains the same consistency and appearance as last 3 days-Cultures were sent and are negative. Gen surgery team 4 following. Ultrasound ordered as per gen surg team done 9/22 inconclusive. CT of abdomen ordered as per gen surg recs; continue to follow-up recs from Gen Surg after CT of abdomen obtained yesterday  - RUE swelling. No erythema or drainage. RUE Doppler 9/23 negative for DVT  - appreciate psych recs- currently feels well on regimen today, will reconsult as needed  - R diabetic foot ulcers x 2- debrided 9/16 by podiatry; L ulcer debrided  9/22- dsg changes and care as per podiatry  - dispo: pending medical stabilization/ OOB status Friday; likely PINKY    Ortho Pager [phone]

## 2020-09-24 NOTE — PROGRESS NOTE ADULT - SUBJECTIVE AND OBJECTIVE BOX
Interval HPI    VITAL SIGNS:  Vital Signs Last 24 Hrs  T(C): 37.2 (24 Sep 2020 06:04), Max: 37.2 (23 Sep 2020 21:48)  T(F): 99 (24 Sep 2020 06:04), Max: 99 (23 Sep 2020 21:48)  HR: 92 (24 Sep 2020 06:04) (81 - 92)  BP: 90/55 (24 Sep 2020 06:04) (90/55 - 120/55)  BP(mean): --  RR: 18 (24 Sep 2020 06:04) (15 - 20)  SpO2: 97% (24 Sep 2020 06:04) (95% - 99%)    PHYSICAL EXAM:    General: in NAD, lying comfortably in bed  HEENT: normocephalic, atraumatic; PERRL, anicteric sclera; MMM  Neck: supple, no JVD, no thyromegaly, no lymphadenopathy  Cardiovascular: +S1/S2, RRR, no M/G/R  Respiratory: clear to auscultation B/L; no wheezing, no rales, no rhonchi  Gastrointestinal: soft, NT/ND; +BSx4, no organomegaly  Extremities: WWP; no edema, clubbing or cyanosis  Vascular: 2+ radial, DP/PT pulses B/L  Neurological: AAOx3; no focal deficits    MEDICATIONS:  MEDICATIONS  (STANDING):  aspirin enteric coated 81 milliGRAM(s) Oral two times a day  cefepime   IVPB 2000 milliGRAM(s) IV Intermittent every 8 hours  chlorhexidine 2% Cloths 1 Application(s) Topical <User Schedule>  DAPTOmycin IVPB 650 milliGRAM(s) IV Intermittent every 24 hours  dextrose 5%. 1000 milliLiter(s) (50 mL/Hr) IV Continuous <Continuous>  dextrose 50% Injectable 12.5 Gram(s) IV Push once  dextrose 50% Injectable 25 Gram(s) IV Push once  dextrose 50% Injectable 25 Gram(s) IV Push once  digoxin     Tablet 0.125 milliGRAM(s) Oral daily  gabapentin 300 milliGRAM(s) Oral three times a day  insulin glargine Injectable (LANTUS) 20 Unit(s) SubCutaneous at bedtime  insulin lispro (HumaLOG) corrective regimen sliding scale   SubCutaneous Before meals and at bedtime  insulin lispro Injectable (HumaLOG) 8 Unit(s) SubCutaneous three times a day before meals  metoprolol succinate ER 25 milliGRAM(s) Oral daily  nystatin Cream 1 Application(s) Topical two times a day  polyethylene glycol 3350 17 Gram(s) Oral daily  prasugrel 10 milliGRAM(s) Oral daily  rifAMPin 300 milliGRAM(s) Oral every 12 hours  rosuvastatin 10 milliGRAM(s) Oral at bedtime  sertraline 25 milliGRAM(s) Oral daily  spironolactone 25 milliGRAM(s) Oral daily  tamsulosin 0.4 milliGRAM(s) Oral at bedtime    MEDICATIONS  (PRN):  cyclobenzaprine 10 milliGRAM(s) Oral three times a day PRN Muscle Spasm  dextrose 40% Gel 15 Gram(s) Oral once PRN Blood Glucose LESS THAN 70 milliGRAM(s)/deciliter  diphenhydrAMINE 50 milliGRAM(s) Oral every 4 hours PRN Rash and/or Itching  glucagon  Injectable 1 milliGRAM(s) IntraMuscular once PRN Glucose LESS THAN 70 milligrams/deciliter  morphine  - Injectable 4 milliGRAM(s) IV Push every 4 hours PRN breakthrough pain  morphine  IR 15 milliGRAM(s) Oral every 4 hours PRN Moderate Pain (4 - 6)  morphine  IR 30 milliGRAM(s) Oral every 4 hours PRN Severe Pain (7 - 10)  naloxone Injectable 0.1 milliGRAM(s) IV Push every 3 minutes PRN For ANY of the following changes in patient status:  A. RR LESS THAN 10 breaths per minute, B. Oxygen saturation LESS THAN 90%, C. Sedation score of 6  ondansetron Injectable 4 milliGRAM(s) IV Push every 6 hours PRN Nausea  senna 2 Tablet(s) Oral at bedtime PRN Constipation  sodium chloride 0.9% lock flush 10 milliLiter(s) IV Push every 1 hour PRN Pre/post blood products, medications, blood draw, and to maintain line patency      ALLERGIES:  Allergies    ACE inhibitors (Hives)  carvedilol (Other)  enalapril (Hives)  Entresto (Other)    Intolerances        LABS:                        8.4    11.30 )-----------( 300      ( 24 Sep 2020 06:55 )             29.6     09-24    134<L>  |  102  |  18  ----------------------------<  108<H>  4.0   |  21<L>  |  0.94    Ca    8.1<L>      24 Sep 2020 06:55  Phos  2.8     09-23  Mg     1.8     09-23    TPro  6.1  /  Alb  2.4<L>  /  TBili  0.5  /  DBili  x   /  AST  26  /  ALT  24  /  AlkPhos  112  09-24        CAPILLARY BLOOD GLUCOSE      POCT Blood Glucose.: 115 mg/dL (24 Sep 2020 08:01)      RADIOLOGY & ADDITIONAL TESTS: Reviewed.   Interval HPI/overnight events: ANAHI overnight. patient seen and examined at bedside, complaining of leg pain after being turned in bed, no acute complaints    VITAL SIGNS:  Vital Signs Last 24 Hrs  T(C): 37.2 (24 Sep 2020 06:04), Max: 37.2 (23 Sep 2020 21:48)  T(F): 99 (24 Sep 2020 06:04), Max: 99 (23 Sep 2020 21:48)  HR: 92 (24 Sep 2020 06:04) (81 - 92)  BP: 90/55 (24 Sep 2020 06:04) (90/55 - 120/55)  BP(mean): --  RR: 18 (24 Sep 2020 06:04) (15 - 20)  SpO2: 97% (24 Sep 2020 06:04) (95% - 99%)    PHYSICAL EXAM:  General: in NAD, lying comfortably in bed  HEENT: normocephalic, atraumatic; PERRL, anicteric sclera; MMM  Neck: supple, no JVD, no thyromegaly, no lymphadenopathy  Cardiovascular: +S1/S2, RRR, no M/G/R  Respiratory: clear to auscultation B/L; no wheezing, no rales, no rhonchi  Gastrointestinal: soft, NT/ND; +BSx4, +RUQ dressing stained with yellow output  Extremities: WWP; +RLE in immobilizer, drain with bloody output. +LLE wound vac draining serosanguinous output, L foot wrapped, dressing c/d/i  Vascular: 2+ radial, DP/PT pulses B/L  Neurological: no focal deficits    MEDICATIONS:  MEDICATIONS  (STANDING):  aspirin enteric coated 81 milliGRAM(s) Oral two times a day  cefepime   IVPB 2000 milliGRAM(s) IV Intermittent every 8 hours  chlorhexidine 2% Cloths 1 Application(s) Topical <User Schedule>  DAPTOmycin IVPB 650 milliGRAM(s) IV Intermittent every 24 hours  dextrose 5%. 1000 milliLiter(s) (50 mL/Hr) IV Continuous <Continuous>  dextrose 50% Injectable 12.5 Gram(s) IV Push once  dextrose 50% Injectable 25 Gram(s) IV Push once  dextrose 50% Injectable 25 Gram(s) IV Push once  digoxin     Tablet 0.125 milliGRAM(s) Oral daily  gabapentin 300 milliGRAM(s) Oral three times a day  insulin glargine Injectable (LANTUS) 20 Unit(s) SubCutaneous at bedtime  insulin lispro (HumaLOG) corrective regimen sliding scale   SubCutaneous Before meals and at bedtime  insulin lispro Injectable (HumaLOG) 8 Unit(s) SubCutaneous three times a day before meals  metoprolol succinate ER 25 milliGRAM(s) Oral daily  nystatin Cream 1 Application(s) Topical two times a day  polyethylene glycol 3350 17 Gram(s) Oral daily  prasugrel 10 milliGRAM(s) Oral daily  rifAMPin 300 milliGRAM(s) Oral every 12 hours  rosuvastatin 10 milliGRAM(s) Oral at bedtime  sertraline 25 milliGRAM(s) Oral daily  spironolactone 25 milliGRAM(s) Oral daily  tamsulosin 0.4 milliGRAM(s) Oral at bedtime    MEDICATIONS  (PRN):  cyclobenzaprine 10 milliGRAM(s) Oral three times a day PRN Muscle Spasm  dextrose 40% Gel 15 Gram(s) Oral once PRN Blood Glucose LESS THAN 70 milliGRAM(s)/deciliter  diphenhydrAMINE 50 milliGRAM(s) Oral every 4 hours PRN Rash and/or Itching  glucagon  Injectable 1 milliGRAM(s) IntraMuscular once PRN Glucose LESS THAN 70 milligrams/deciliter  morphine  - Injectable 4 milliGRAM(s) IV Push every 4 hours PRN breakthrough pain  morphine  IR 15 milliGRAM(s) Oral every 4 hours PRN Moderate Pain (4 - 6)  morphine  IR 30 milliGRAM(s) Oral every 4 hours PRN Severe Pain (7 - 10)  naloxone Injectable 0.1 milliGRAM(s) IV Push every 3 minutes PRN For ANY of the following changes in patient status:  A. RR LESS THAN 10 breaths per minute, B. Oxygen saturation LESS THAN 90%, C. Sedation score of 6  ondansetron Injectable 4 milliGRAM(s) IV Push every 6 hours PRN Nausea  senna 2 Tablet(s) Oral at bedtime PRN Constipation  sodium chloride 0.9% lock flush 10 milliLiter(s) IV Push every 1 hour PRN Pre/post blood products, medications, blood draw, and to maintain line patency      ALLERGIES:  Allergies    ACE inhibitors (Hives)  carvedilol (Other)  enalapril (Hives)  Entresto (Other)    Intolerances        LABS:                        8.4    11.30 )-----------( 300      ( 24 Sep 2020 06:55 )             29.6     09-24    134<L>  |  102  |  18  ----------------------------<  108<H>  4.0   |  21<L>  |  0.94    Ca    8.1<L>      24 Sep 2020 06:55  Phos  2.8     09-23  Mg     1.8     09-23    TPro  6.1  /  Alb  2.4<L>  /  TBili  0.5  /  DBili  x   /  AST  26  /  ALT  24  /  AlkPhos  112  09-24        CAPILLARY BLOOD GLUCOSE      POCT Blood Glucose.: 115 mg/dL (24 Sep 2020 08:01)      RADIOLOGY & ADDITIONAL TESTS: Reviewed.

## 2020-09-24 NOTE — PROGRESS NOTE ADULT - ASSESSMENT
A/P: Pt doing ok post-op.   1. Will remove VACs tomorrow and replace with adaptec, gauze.   2. Maintain drain until less than 20mL/24 hours  3. Ok to bear weight on left side tomorrow

## 2020-09-24 NOTE — PROGRESS NOTE ADULT - ASSESSMENT
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  He has completed 6 weeks of vancomycin & rifampin.  He is s/p R hip exchange of hardware on 9/4 - OR cultures NGTD.  I&D of abd wall done 9/4 - cultures growing Pseudomonas and Enterococcus faecium. Gallium SPECT done 9/10 showed minimal uptake from GB fossa to soft tissue and was interpreted by surgery as a negative study. S/p R knee spacer exchange, muscle flap and L shin ulcer debridement on 9/18.  Now again with purulent drainage from RUQ, s/p ultrasound on 9/22 showing no change in CBD dilation    Recommendations:  - f/u RUQ cultures (9/22)  - fluid cx (9/5 & 9/4): Pseudomonas & VRE faecium  - c/w daptomycin  675 mg IV q24h  - c/w cefepime 2 g IV q8h  - c/w rifampin 300 mg po q12h  - obtain CPK weekly while on Dapto  - tissue cx (9/18): NGTD    ID team 1 will follow  Primary team updated   66 yo M with HTN, hyperlipidemia, type II DM with peripheral neuropathy, CAD s/p MIDCAB (2018)/VERONICA, HFrEF, AICD (2/20), pulmonary HTN, chronic cholecystitis with recurrent R knee PPJI - MRSA, likely persistent from 11/2019.  He has completed 6 weeks of vancomycin & rifampin.  He is s/p R hip exchange of hardware on 9/4 - OR cultures NGTD.  I&D of abd wall done 9/4 - cultures growing Pseudomonas and Enterococcus faecium. Gallium SPECT done 9/10 showed minimal uptake from GB fossa to soft tissue and was interpreted by surgery as a negative study. S/p R knee spacer exchange, muscle flap and L shin ulcer debridement on 9/18.  Now again with purulent drainage from RUQ, s/p ultrasound on 9/22 showing no change in CBD dilation. Patient with expression of yellow mucus at bedside from RUQ site, swabbed and sent for culture    Recommendations:  - f/u RUQ/body fluid cultures (9/24)  - body fluid/RUQ cultures (9/22): NGTD  - fluid cx (9/5 & 9/4): Pseudomonas & VRE faecium  - c/w daptomycin  675 mg IV q24h  - c/w cefepime 2 g IV q8h  - c/w rifampin 300 mg po q12h  - obtain CPK weekly while on Dapto  - tissue cx (9/18): NGTD    ID team 1 will follow  Primary team updated

## 2020-09-25 LAB
ALBUMIN SERPL ELPH-MCNC: 2.2 G/DL — LOW (ref 3.3–5)
ALP SERPL-CCNC: 109 U/L — SIGNIFICANT CHANGE UP (ref 40–120)
ALT FLD-CCNC: 22 U/L — SIGNIFICANT CHANGE UP (ref 10–45)
ANION GAP SERPL CALC-SCNC: 8 MMOL/L — SIGNIFICANT CHANGE UP (ref 5–17)
AST SERPL-CCNC: 24 U/L — SIGNIFICANT CHANGE UP (ref 10–40)
BILIRUB SERPL-MCNC: 0.4 MG/DL — SIGNIFICANT CHANGE UP (ref 0.2–1.2)
BUN SERPL-MCNC: 18 MG/DL — SIGNIFICANT CHANGE UP (ref 7–23)
CALCIUM SERPL-MCNC: 8.5 MG/DL — SIGNIFICANT CHANGE UP (ref 8.4–10.5)
CHLORIDE SERPL-SCNC: 102 MMOL/L — SIGNIFICANT CHANGE UP (ref 96–108)
CO2 SERPL-SCNC: 24 MMOL/L — SIGNIFICANT CHANGE UP (ref 22–31)
CREAT SERPL-MCNC: 0.88 MG/DL — SIGNIFICANT CHANGE UP (ref 0.5–1.3)
CULTURE RESULTS: NO GROWTH — SIGNIFICANT CHANGE UP
CULTURE RESULTS: NO GROWTH — SIGNIFICANT CHANGE UP
DIGOXIN SERPL-MCNC: 1 NG/ML — SIGNIFICANT CHANGE UP (ref 0.8–2)
GLUCOSE BLDC GLUCOMTR-MCNC: 106 MG/DL — HIGH (ref 70–99)
GLUCOSE BLDC GLUCOMTR-MCNC: 116 MG/DL — HIGH (ref 70–99)
GLUCOSE BLDC GLUCOMTR-MCNC: 116 MG/DL — HIGH (ref 70–99)
GLUCOSE BLDC GLUCOMTR-MCNC: 98 MG/DL — SIGNIFICANT CHANGE UP (ref 70–99)
GLUCOSE SERPL-MCNC: 123 MG/DL — HIGH (ref 70–99)
NIGHT BLUE STAIN TISS: SIGNIFICANT CHANGE UP
POTASSIUM SERPL-MCNC: 4 MMOL/L — SIGNIFICANT CHANGE UP (ref 3.5–5.3)
POTASSIUM SERPL-SCNC: 4 MMOL/L — SIGNIFICANT CHANGE UP (ref 3.5–5.3)
PROT SERPL-MCNC: 5.7 G/DL — LOW (ref 6–8.3)
SODIUM SERPL-SCNC: 134 MMOL/L — LOW (ref 135–145)
SPECIMEN SOURCE: SIGNIFICANT CHANGE UP

## 2020-09-25 PROCEDURE — 99232 SBSQ HOSP IP/OBS MODERATE 35: CPT | Mod: GC

## 2020-09-25 PROCEDURE — 99232 SBSQ HOSP IP/OBS MODERATE 35: CPT

## 2020-09-25 RX ORDER — MORPHINE SULFATE 50 MG/1
30 CAPSULE, EXTENDED RELEASE ORAL EVERY 4 HOURS
Refills: 0 | Status: DISCONTINUED | OUTPATIENT
Start: 2020-09-25 | End: 2020-10-02

## 2020-09-25 RX ORDER — BACITRACIN ZINC 500 UNIT/G
1 OINTMENT IN PACKET (EA) TOPICAL DAILY
Refills: 0 | Status: DISCONTINUED | OUTPATIENT
Start: 2020-09-25 | End: 2020-10-03

## 2020-09-25 RX ORDER — MORPHINE SULFATE 50 MG/1
15 CAPSULE, EXTENDED RELEASE ORAL EVERY 4 HOURS
Refills: 0 | Status: DISCONTINUED | OUTPATIENT
Start: 2020-09-25 | End: 2020-09-28

## 2020-09-25 RX ADMIN — MORPHINE SULFATE 30 MILLIGRAM(S): 50 CAPSULE, EXTENDED RELEASE ORAL at 06:24

## 2020-09-25 RX ADMIN — Medication 81 MILLIGRAM(S): at 18:04

## 2020-09-25 RX ADMIN — DAPTOMYCIN 126 MILLIGRAM(S): 500 INJECTION, POWDER, LYOPHILIZED, FOR SOLUTION INTRAVENOUS at 00:13

## 2020-09-25 RX ADMIN — GABAPENTIN 300 MILLIGRAM(S): 400 CAPSULE ORAL at 21:36

## 2020-09-25 RX ADMIN — Medication 8 UNIT(S): at 08:44

## 2020-09-25 RX ADMIN — TAMSULOSIN HYDROCHLORIDE 0.4 MILLIGRAM(S): 0.4 CAPSULE ORAL at 21:36

## 2020-09-25 RX ADMIN — CHLORHEXIDINE GLUCONATE 1 APPLICATION(S): 213 SOLUTION TOPICAL at 06:11

## 2020-09-25 RX ADMIN — ROSUVASTATIN CALCIUM 10 MILLIGRAM(S): 5 TABLET ORAL at 21:36

## 2020-09-25 RX ADMIN — INSULIN GLARGINE 20 UNIT(S): 100 INJECTION, SOLUTION SUBCUTANEOUS at 21:36

## 2020-09-25 RX ADMIN — CEFEPIME 100 MILLIGRAM(S): 1 INJECTION, POWDER, FOR SOLUTION INTRAMUSCULAR; INTRAVENOUS at 09:20

## 2020-09-25 RX ADMIN — PRASUGREL 10 MILLIGRAM(S): 5 TABLET, FILM COATED ORAL at 13:09

## 2020-09-25 RX ADMIN — MORPHINE SULFATE 30 MILLIGRAM(S): 50 CAPSULE, EXTENDED RELEASE ORAL at 06:54

## 2020-09-25 RX ADMIN — MORPHINE SULFATE 30 MILLIGRAM(S): 50 CAPSULE, EXTENDED RELEASE ORAL at 16:27

## 2020-09-25 RX ADMIN — POLYETHYLENE GLYCOL 3350 17 GRAM(S): 17 POWDER, FOR SOLUTION ORAL at 13:09

## 2020-09-25 RX ADMIN — MORPHINE SULFATE 4 MILLIGRAM(S): 50 CAPSULE, EXTENDED RELEASE ORAL at 18:10

## 2020-09-25 RX ADMIN — GABAPENTIN 300 MILLIGRAM(S): 400 CAPSULE ORAL at 13:13

## 2020-09-25 RX ADMIN — MORPHINE SULFATE 4 MILLIGRAM(S): 50 CAPSULE, EXTENDED RELEASE ORAL at 19:07

## 2020-09-25 RX ADMIN — NYSTATIN CREAM 1 APPLICATION(S): 100000 CREAM TOPICAL at 06:12

## 2020-09-25 RX ADMIN — CEFEPIME 100 MILLIGRAM(S): 1 INJECTION, POWDER, FOR SOLUTION INTRAMUSCULAR; INTRAVENOUS at 01:02

## 2020-09-25 RX ADMIN — MORPHINE SULFATE 30 MILLIGRAM(S): 50 CAPSULE, EXTENDED RELEASE ORAL at 17:03

## 2020-09-25 RX ADMIN — Medication 1 APPLICATION(S): at 13:11

## 2020-09-25 RX ADMIN — SPIRONOLACTONE 25 MILLIGRAM(S): 25 TABLET, FILM COATED ORAL at 06:10

## 2020-09-25 RX ADMIN — Medication 81 MILLIGRAM(S): at 06:10

## 2020-09-25 RX ADMIN — MORPHINE SULFATE 30 MILLIGRAM(S): 50 CAPSULE, EXTENDED RELEASE ORAL at 21:00

## 2020-09-25 RX ADMIN — Medication 0.12 MILLIGRAM(S): at 06:10

## 2020-09-25 RX ADMIN — Medication 25 MILLIGRAM(S): at 06:12

## 2020-09-25 RX ADMIN — SERTRALINE 25 MILLIGRAM(S): 25 TABLET, FILM COATED ORAL at 13:09

## 2020-09-25 RX ADMIN — MORPHINE SULFATE 30 MILLIGRAM(S): 50 CAPSULE, EXTENDED RELEASE ORAL at 20:15

## 2020-09-25 RX ADMIN — GABAPENTIN 300 MILLIGRAM(S): 400 CAPSULE ORAL at 06:10

## 2020-09-25 RX ADMIN — Medication 8 UNIT(S): at 13:23

## 2020-09-25 NOTE — PROGRESS NOTE ADULT - ATTENDING COMMENTS
Making slow forward progress. Pain gradually improving. Skin grafts both taking well as per Dr. Mckeon. Advance WB/activity today: TTWB RLE, WBAT LLE, OOB to chair and advance as tolerated; continuing close fall precautions. Still for right knee immobilization at all times with either knee immobilizer or locked [place] brace. Continue trending inflammatory markers and managing pain. Wound care per Plastics. Dispo: SNF.

## 2020-09-25 NOTE — CHART NOTE - NSCHARTNOTEFT_GEN_A_CORE
Plastic Surgery    Bilateral VAC dressings removed.  STSG with good take bilaterally.    Wounds redressed with bacitracin to graft, adaptic, ABD pads and tape.  To be changed daily.    Please maintain knee immobilizer to RLE.  OK to bear weight bilaterally from Plastic Surgery perspective

## 2020-09-25 NOTE — PROGRESS NOTE ADULT - SUBJECTIVE AND OBJECTIVE BOX
Pain Management Consult Note - [facility] [phone]        HPI: Patient seen and examined today. Patient with a history of knee pain, diabetic neuropathy, CHF, HTN, MRSA bacteremia, diverticulitis, COPD, hyperkalemia, chronic systolic CHF, osteoarthritis, s/p R Revision TKA and antibiotic spacer by Dr. Castro on 7/22, now presenting with R knee pain and swelling x3 days, now s/p R knee spacer exchange, gastro flap with skin graft.  Patient reports R knee pain, LLE pain, patient reports moderate pain relief with  current pain medication regimen. Patient Axox3, denies any itchiness from Morphine Po. Reviewed pain medication regimen with patient at bedside.          Pain is _x__ sharp ____dull ___burning _x__achy ___ Intensity: ____ mild _x__mod x__severe     Location _x___surgical site ____cervical _____lumbar ____abd ____upper ext__x__lower ext    Worse with _x___activity _x___movement _____physical therapy___ Rest    Improved with _x___medication __x__rest ____physical therapy      ROS: Const:  -___febrile   Eyes:___ENT:___CV: __-_chest pain  Resp: __-__sob  GI:_-__nausea __-_vomiting _-_ abd pain ___npo ___clears _x_full diet __bm  :___ [place]: _x__pain _-__spasm  Skin:___ Neuro:  _-__ptxvkcrf_-__etgsdvuov_-__ numbness _-__weakness __x_paresth  Psych:_-_anxiety  Endo:___ Heme:___Allergy:_________, _x__all others reviewed and negative      PAST MEDICAL & SURGICAL HISTORY:  Diabetic neuropathy  STEMI (ST elevation myocardial infarction)  Diverticulitis  MRSA bacteremia  History of celiac disease  CHF (congestive heart failure): EF ~ 25%  HTN (hypertension)  Diabetes: on insulin pump  Blood clot due to device, implant, or graft: was on blood thinners  HLD (hyperlipidemia)  Osteoarthritis  Atherosclerosis of coronary artery: CAD (coronary artery disease)  Status post percutaneous transluminal coronary angioplasty: in 2012  History of open reduction and internal fixation (ORIF) procedure  Surgery, elective: Right shoulder  Surgery, elective: right knee wound debridement  S/P TKR (total knee replacement), right: with infection Mrsa   per pt he was cleared from MRSA infection  S/P CABG x 1: 2018  Stented coronary artery: 10/18 heart attack  INFERIOR WALL MI  Other postprocedural status: Fixation hardware in foot LEFT      SH: _-__Tobacco   -___Alcohol                          FH:FAMILY HISTORY:  Family history of allergies  daughter    Family history of heart disease (Mother)        aspirin enteric coated 81 milliGRAM(s) Oral two times a day  BACItracin   Ointment 1 Application(s) Topical daily  cefepime   IVPB 2000 milliGRAM(s) IV Intermittent every 8 hours  chlorhexidine 2% Cloths 1 Application(s) Topical <User Schedule>  cyclobenzaprine 10 milliGRAM(s) Oral three times a day PRN  DAPTOmycin IVPB 650 milliGRAM(s) IV Intermittent every 24 hours  dextrose 40% Gel 15 Gram(s) Oral once PRN  dextrose 5%. 1000 milliLiter(s) IV Continuous <Continuous>  dextrose 50% Injectable 12.5 Gram(s) IV Push once  dextrose 50% Injectable 25 Gram(s) IV Push once  dextrose 50% Injectable 25 Gram(s) IV Push once  digoxin     Tablet 0.125 milliGRAM(s) Oral daily  diphenhydrAMINE 50 milliGRAM(s) Oral every 4 hours PRN  gabapentin 300 milliGRAM(s) Oral three times a day  glucagon  Injectable 1 milliGRAM(s) IntraMuscular once PRN  insulin glargine Injectable (LANTUS) 20 Unit(s) SubCutaneous at bedtime  insulin lispro (HumaLOG) corrective regimen sliding scale   SubCutaneous Before meals and at bedtime  insulin lispro Injectable (HumaLOG) 8 Unit(s) SubCutaneous three times a day before meals  metoprolol succinate ER 25 milliGRAM(s) Oral daily  morphine  - Injectable 4 milliGRAM(s) IV Push every 4 hours PRN  morphine  IR 15 milliGRAM(s) Oral every 4 hours PRN  morphine  IR 30 milliGRAM(s) Oral every 4 hours PRN  naloxone Injectable 0.1 milliGRAM(s) IV Push every 3 minutes PRN  nystatin Cream 1 Application(s) Topical two times a day  ondansetron Injectable 4 milliGRAM(s) IV Push every 6 hours PRN  polyethylene glycol 3350 17 Gram(s) Oral daily  prasugrel 10 milliGRAM(s) Oral daily  rifAMPin 300 milliGRAM(s) Oral every 12 hours  rosuvastatin 10 milliGRAM(s) Oral at bedtime  senna 2 Tablet(s) Oral at bedtime PRN  sertraline 25 milliGRAM(s) Oral daily  sodium chloride 0.9% lock flush 10 milliLiter(s) IV Push every 1 hour PRN  spironolactone 25 milliGRAM(s) Oral daily  tamsulosin 0.4 milliGRAM(s) Oral at bedtime      T(C): 36.3 (09-25-20 @ 11:43), Max: 37.2 (09-24-20 @ 13:36)  HR: 78 (09-25-20 @ 11:43) (68 - 88)  BP: 84/54 (09-25-20 @ 11:43) (84/54 - 108/55)  RR: 17 (09-25-20 @ 11:43) (14 - 18)  SpO2: 94% (09-25-20 @ 11:43) (93% - 98%)  Wt(kg): --    T(C): 36.3 (09-25-20 @ 11:43), Max: 37.2 (09-24-20 @ 13:36)  HR: 78 (09-25-20 @ 11:43) (68 - 88)  BP: 84/54 (09-25-20 @ 11:43) (84/54 - 108/55)  RR: 17 (09-25-20 @ 11:43) (14 - 18)  SpO2: 94% (09-25-20 @ 11:43) (93% - 98%)  Wt(kg): --    T(C): 36.3 (09-25-20 @ 11:43), Max: 37.2 (09-24-20 @ 13:36)  HR: 78 (09-25-20 @ 11:43) (68 - 88)  BP: 84/54 (09-25-20 @ 11:43) (84/54 - 108/55)  RR: 17 (09-25-20 @ 11:43) (14 - 18)  SpO2: 94% (09-25-20 @ 11:43) (93% - 98%)  Wt(kg): --       PHYSICAL EXAM:  Gen Appearance: x___no acute distress _x__appropriate        Neuro: _x__SILT feet____ EOM Intact Psych: AAOX__3, x___mood/affect appropriate        Eyes: __x_conjunctiva WNL  __x__ Pupils equal and round        ENT: x___ears and nose atraumatic_x__ Hearing grossly intact        Neck: _x__trachea midline, no visible masses ___thyroid without palpable mass    Resp: _x__Nml WOB____No tactile fremitus ___clear to auscultation    Cardio: ___extremities free from edema __x__pedal pulses palpable    GI/Abdomen: __x_soft ___x__ Nontender___x___Nondistended_____HSM    Lymphatic: ___no palpable nodes in neck  ___no palpable nodes calves and feet    Skin/Wound: ___Incision, _x__Dressing c/d/i,   ____surrounding tissues soft,  ___drain/chest tube present____    Muscular: EHL _4__/5  Gastrocnemius_4__/5    ___absent clubbing/cyanosis          ASSESSMENT: This is a 65y old Male with a history of knee pain, diabetic neuropathy, CHF, HTN, MRSA bacteremia, diverticulitis, COPD, hyperkalemia, chronic systolic CHF, osteoarthritis,  s/p R Revision TKA and antibiotic spacer by Dr. Castro on 7/22, now presenting with R knee pain and swelling x3 days, now s/p R knee spacer exchange, gastro flap with skin graft, pain controlled with current pain medication regimen.      Recommended Treatment PLAN:  1. Morphine IR 30mg Po Q4h prn moderate to severe pain  2. Flexeril 5mg PO Q8h prn muscle spasms  3. Gabapentin 300mg PO TID  4. Morphine 4mg IVP Q4h prn breakthrough pain  Plan discussed with Dr. Chavez

## 2020-09-25 NOTE — PROGRESS NOTE ADULT - SUBJECTIVE AND OBJECTIVE BOX
INTERVAL HISTORY:  Pain much improved. Not ambulating, no dyspnea or dizziness	  Chart, [facility] medications and allergies reviewed    MEDICATIONS:  MEDICATIONS  (STANDING):  aspirin enteric coated 81 milliGRAM(s) Oral two times a day  cefepime   IVPB 2000 milliGRAM(s) IV Intermittent every 8 hours  chlorhexidine 2% Cloths 1 Application(s) Topical <User Schedule>  DAPTOmycin IVPB 650 milliGRAM(s) IV Intermittent every 24 hours  dextrose 5%. 1000 milliLiter(s) (50 mL/Hr) IV Continuous <Continuous>  dextrose 50% Injectable 12.5 Gram(s) IV Push once  dextrose 50% Injectable 25 Gram(s) IV Push once  dextrose 50% Injectable 25 Gram(s) IV Push once  digoxin     Tablet 0.125 milliGRAM(s) Oral daily  gabapentin 300 milliGRAM(s) Oral three times a day  insulin glargine Injectable (LANTUS) 20 Unit(s) SubCutaneous at bedtime  insulin lispro (HumaLOG) corrective regimen sliding scale   SubCutaneous Before meals and at bedtime  insulin lispro Injectable (HumaLOG) 8 Unit(s) SubCutaneous three times a day before meals  metoprolol succinate ER 25 milliGRAM(s) Oral daily  nystatin Cream 1 Application(s) Topical two times a day  polyethylene glycol 3350 17 Gram(s) Oral daily  prasugrel 10 milliGRAM(s) Oral daily  rifAMPin 300 milliGRAM(s) Oral every 12 hours  rosuvastatin 10 milliGRAM(s) Oral at bedtime  sertraline 25 milliGRAM(s) Oral daily  spironolactone 25 milliGRAM(s) Oral daily  tamsulosin 0.4 milliGRAM(s) Oral at bedtime      REVIEW OF SYSTEMS:  CONSTITUTIONAL: No fever, no weight loss, no fatigue  PULMONARY: No cough, no wheezing, chills no hemoptysis; No Shortness of Breath  CARDIOVASCULAR: No chest pain, no palpitations, no syncope, dizziness, no leg swelling  GASTROINTESTINAL: No abdominal pain. No nausea, no  vomiting, no hematemesis; No diarrhea, no constipation. No melena, no hematochezia.  GENITOURINARY: No dysuria, no frequency, no hematuria, no incontinence  SKIN: No itching, no burning, no rashes, no lesions     PHYSICAL EXAM:  T(C): 36.7 (09-25-20 @ 05:53), Max: 37.2 (09-24-20 @ 13:36)  HR: 84 (09-25-20 @ 05:53) (68 - 88)  BP: 108/55 (09-25-20 @ 05:53) (92/47 - 108/55)  RR: 16 (09-25-20 @ 05:53) (14 - 18)  SpO2: 97% (09-25-20 @ 05:53) (93% - 98%)  Wt(kg): --  I&O's Summary    24 Sep 2020 07:01  -  25 Sep 2020 07:00  --------------------------------------------------------  IN: 1930 mL / OUT: 1990 mL / NET: -60 mL        Appearance:  No deformities, normal appearance	  HEENT:   Normal oral mucosa, PERRL, EOMI	  Neck: No jvd , no masses  Lymphatic: No  lymphadenopathy  Pulmonary: Lungs clear to auscultation and percussion  Cardiovascular: Normal S1 S2, No JVD, No murmur, tredema	  Abdomen:  Soft, Non-tender, + BS  Skin: No rashes, No ecchymoses	  Extremities:  No clubbing or cyanosis  MSK: R brace    LABS:		  CARDIAC MARKERS:                         8.8    10.82 )-----------( 339      ( 25 Sep 2020 07:25 )             30.4   09-25    134<L>  |  102  |  18  ----------------------------<  123<H>  4.0   |  24  |  0.88    Ca    8.5      25 Sep 2020 07:25  Phos  2.8     09-23  Mg     1.8     09-23    TPro  5.7<L>  /  Alb  2.2<L>  /  TBili  0.4  /  DBili  x   /  AST  24  /  ALT  22  /  AlkPhos  109  09-25    proBNP:  Lipid Profile:  HgA1c:  TSH:      Culture - Body Fluid with Gram Stain (collected 09-24-20 @ 13:40)  Source: .Body Fluid Abdominal Fluid/ E-swab  Gram Stain (09-24-20 @ 16:42):    No organisms seen    Rare WBC's    Culture - Acid Fast - Body Fluid w/Smear (collected 09-22-20 @ 00:56)  Source: .Body Fluid Abdominal Fluid  Preliminary Report (09-23-20 @ 15:05):    Culture is being performed.    Culture - Fungal, Body Fluid (collected 09-22-20 @ 00:55)  Source: .Body Fluid Abdominal Fluid  Preliminary Report (09-22-20 @ 11:33):    Testing in progress    Culture - Body Fluid with Gram Stain (collected 09-21-20 @ 14:44)  Source: .Body Fluid Abdominal Fluid  Gram Stain (09-21-20 @ 17:15):    No organisms seen    No WBC's seen.  Preliminary Report (09-22-20 @ 09:09):    No growth to date      TELEMETRY: 	      QTC     ECG:  	   QTC         RADIOLOGY:   DIAGNOSTIC TESTING: [ ] Echocardiogram: [ ]  Catheterization: [ ] Stress Test:      ASSESSMENT/PLAN: 	  Severely reduced ejection fraction-The patient is without dyspnea.  Mild edema. Continue metoprolol digoxin and spironolactone.    Coronary artery disease-the patient denies chest discomfort. His EKG is uninterpretable secondary to the pacemaker. He was revascularized less than two years ago. There is no clinical evidence for ischemia. On rosuvastatin. The patient is history of stent thrombosis. He had a stent placed less than two years ago. continue prasugrel and aspirin.     Ventiricular tachycardia/Defibrillator- asymptomatic. Off monitor.     Hyponatremia- mild-agree with discontinuing fluid restriction    Septic knee- as per ID and orthopedics.

## 2020-09-25 NOTE — PROGRESS NOTE ADULT - ASSESSMENT
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  He has completed 6 weeks of vancomycin & rifampin.  He is s/p R hip exchange of hardware on 9/4 - OR cultures NGTD.  I&D of abd wall done 9/4 - cultures growing Pseudomonas and Enterococcus faecium. Gallium SPECT done 9/10 showed minimal uptake from GB fossa to soft tissue and was interpreted by surgery as a negative study. S/p R knee spacer exchange, muscle flap and L shin ulcer debridement on 9/18.  Now again with purulent drainage from RUQ, s/p ultrasound on 9/22 showing no change in CBD dilation. Patient with expression of yellow mucus at bedside from RUQ site, swabbed and sent for culture    Recommendations:  - f/u RUQ/body fluid cultures (9/24)  - body fluid/RUQ cultures (9/22): NGTD  - fluid cx (9/5 & 9/4): Pseudomonas & VRE faecium  - c/w daptomycin  675 mg IV q24h  - c/w rifampin 300 mg po q12h  - stop cefepime  - obtain CPK weekly while on Dapto (next on Monday 9/28)  - tissue cx (9/18): NGTD    ID team 1 will follow  Primary team updated

## 2020-09-25 NOTE — PROGRESS NOTE ADULT - SUBJECTIVE AND OBJECTIVE BOX
POST OPERATIVE DAY #:   STATUS POST: Left    Right  TKA/THR                        SUBJECTIVE: Patient seen and examined.   Denies any sob/cp/n/v/numbness or tingling in b/l     OBJECTIVE:     Vital Signs Last 24 Hrs  T(C): 36.7 (25 Sep 2020 05:53), Max: 37.2 (24 Sep 2020 13:36)  T(F): 98 (25 Sep 2020 05:53), Max: 99 (24 Sep 2020 13:36)  HR: 84 (25 Sep 2020 05:53) (68 - 88)  BP: 108/55 (25 Sep 2020 05:53) (92/47 - 108/55)  BP(mean): 73 (25 Sep 2020 05:53) (62 - 74)  RR: 16 (25 Sep 2020 05:53) (14 - 18)  SpO2: 97% (25 Sep 2020 05:53) (93% - 98%)    Affected extremity:          Dressing: clean/dry/intact          Sensation: intact to light touch to patient's baseline         Motor exam: EHL/TA/GS   Pulses             I&O's Detail    24 Sep 2020 07:01  -  25 Sep 2020 07:00  --------------------------------------------------------  IN:    Oral Fluid: 1930 mL  Total IN: 1930 mL    OUT:    Bulb (mL): 100 mL    Estimated Blood Loss (mL): 0 mL    VAC (Vacuum Assisted Closure) System (mL): 40 mL    VAC (Vacuum Assisted Closure) System (mL): 100 mL    Voided (mL): 1750 mL  Total OUT: 1990 mL    Total NET: -60 mL          LABS:                        8.8    10.82 )-----------( 339      ( 25 Sep 2020 07:25 )             30.4     09-25    134<L>  |  102  |  18  ----------------------------<  123<H>  4.0   |  24  |  0.88    Ca    8.5      25 Sep 2020 07:25  Phos  2.8     09-23  Mg     1.8     09-23    TPro  5.7<L>  /  Alb  2.2<L>  /  TBili  0.4  /  DBili  x   /  AST  24  /  ALT  22  /  AlkPhos  109  09-25          MEDICATIONS:  cefepime   IVPB 2000 milliGRAM(s) IV Intermittent every 8 hours  DAPTOmycin IVPB 650 milliGRAM(s) IV Intermittent every 24 hours  rifAMPin 300 milliGRAM(s) Oral every 12 hours    cyclobenzaprine 10 milliGRAM(s) Oral three times a day PRN  diphenhydrAMINE 50 milliGRAM(s) Oral every 4 hours PRN  gabapentin 300 milliGRAM(s) Oral three times a day  morphine  - Injectable 4 milliGRAM(s) IV Push every 4 hours PRN  morphine  IR 15 milliGRAM(s) Oral every 4 hours PRN  morphine  IR 30 milliGRAM(s) Oral every 4 hours PRN  ondansetron Injectable 4 milliGRAM(s) IV Push every 6 hours PRN  sertraline 25 milliGRAM(s) Oral daily    aspirin enteric coated 81 milliGRAM(s) Oral two times a day  prasugrel 10 milliGRAM(s) Oral daily        ASSESSMENT AND PLAN: 64yo Male s/p     1. Analgesic pain control  2. DVT prophylaxis: ASA      HSQ       Coumadin      Lovenox      SCDs      Other:   3. Weight Bearing Status:  Weight bearing as tolerated       NWB         Partial Weight Bearing  4. Disposition: Home          Subacute Rehab      pending PT evaluation POST OPERATIVE DAY #:   STATUS POST: Right knee abx spacer                      SUBJECTIVE: Patient seen and examined. Pt. is upset at his food, nursing at night, and just overall upset with the way things are. Pt. still   Denies any sob/cp/n/v.     OBJECTIVE:     Vital Signs Last 24 Hrs  T(C): 36.7 (25 Sep 2020 05:53), Max: 37.2 (24 Sep 2020 13:36)  T(F): 98 (25 Sep 2020 05:53), Max: 99 (24 Sep 2020 13:36)  HR: 84 (25 Sep 2020 05:53) (68 - 88)  BP: 108/55 (25 Sep 2020 05:53) (92/47 - 108/55)  BP(mean): 73 (25 Sep 2020 05:53) (62 - 74)  RR: 16 (25 Sep 2020 05:53) (14 - 18)  SpO2: 97% (25 Sep 2020 05:53) (93% - 98%)    Affected extremity: right knee         Dressing: clean/dry/intact          Sensation: intact to light touch to patient's baseline         Motor exam: EHL/TA/GS   Pulses             I&O's Detail    24 Sep 2020 07:01  -  25 Sep 2020 07:00  --------------------------------------------------------  IN:    Oral Fluid: 1930 mL  Total IN: 1930 mL    OUT:    Bulb (mL): 100 mL    Estimated Blood Loss (mL): 0 mL    VAC (Vacuum Assisted Closure) System (mL): 40 mL    VAC (Vacuum Assisted Closure) System (mL): 100 mL    Voided (mL): 1750 mL  Total OUT: 1990 mL    Total NET: -60 mL          LABS:                        8.8    10.82 )-----------( 339      ( 25 Sep 2020 07:25 )             30.4     09-25    134<L>  |  102  |  18  ----------------------------<  123<H>  4.0   |  24  |  0.88    Ca    8.5      25 Sep 2020 07:25  Phos  2.8     09-23  Mg     1.8     09-23    TPro  5.7<L>  /  Alb  2.2<L>  /  TBili  0.4  /  DBili  x   /  AST  24  /  ALT  22  /  AlkPhos  109  09-25          MEDICATIONS:  cefepime   IVPB 2000 milliGRAM(s) IV Intermittent every 8 hours  DAPTOmycin IVPB 650 milliGRAM(s) IV Intermittent every 24 hours  rifAMPin 300 milliGRAM(s) Oral every 12 hours    cyclobenzaprine 10 milliGRAM(s) Oral three times a day PRN  diphenhydrAMINE 50 milliGRAM(s) Oral every 4 hours PRN  gabapentin 300 milliGRAM(s) Oral three times a day  morphine  - Injectable 4 milliGRAM(s) IV Push every 4 hours PRN  morphine  IR 15 milliGRAM(s) Oral every 4 hours PRN  morphine  IR 30 milliGRAM(s) Oral every 4 hours PRN  ondansetron Injectable 4 milliGRAM(s) IV Push every 6 hours PRN  sertraline 25 milliGRAM(s) Oral daily    aspirin enteric coated 81 milliGRAM(s) Oral two times a day  prasugrel 10 milliGRAM(s) Oral daily        ASSESSMENT AND PLAN: 66yo Male s/p     1. Analgesic pain control  2. DVT prophylaxis: ASA      HSQ       Coumadin      Lovenox      SCDs      Other:   3. Weight Bearing Status:  Weight bearing as tolerated       NWB         Partial Weight Bearing  4. Disposition: Home          Subacute Rehab      pending PT evaluation POST OPERATIVE DAY #:   STATUS POST: Right knee abx spacer                      SUBJECTIVE: Patient seen and examined. Pt. is upset at his food, nursing at night, and just overall upset with the way things are. Pt. still has decreased sensation in both his legs.   Denies any sob/cp/n/v.     OBJECTIVE:     Vital Signs Last 24 Hrs  T(C): 36.7 (25 Sep 2020 05:53), Max: 37.2 (24 Sep 2020 13:36)  T(F): 98 (25 Sep 2020 05:53), Max: 99 (24 Sep 2020 13:36)  HR: 84 (25 Sep 2020 05:53) (68 - 88)  BP: 108/55 (25 Sep 2020 05:53) (92/47 - 108/55)  BP(mean): 73 (25 Sep 2020 05:53) (62 - 74)  RR: 16 (25 Sep 2020 05:53) (14 - 18)  SpO2: 97% (25 Sep 2020 05:53) (93% - 98%)    Affected extremity: right knee         Dressing: clean/dry/intact wound vacs in place b/l les         Sensation: intact to light touch to patient's baseline         Motor exam: EHL/TA/GS   Pulses intact              I&O's Detail    24 Sep 2020 07:01  -  25 Sep 2020 07:00  --------------------------------------------------------  IN:    Oral Fluid: 1930 mL  Total IN: 1930 mL    OUT:    Bulb (mL): 100 mL    Estimated Blood Loss (mL): 0 mL    VAC (Vacuum Assisted Closure) System (mL): 40 mL    VAC (Vacuum Assisted Closure) System (mL): 100 mL    Voided (mL): 1750 mL  Total OUT: 1990 mL    Total NET: -60 mL          LABS:                        8.8    10.82 )-----------( 339      ( 25 Sep 2020 07:25 )             30.4     09-25    134<L>  |  102  |  18  ----------------------------<  123<H>  4.0   |  24  |  0.88    Ca    8.5      25 Sep 2020 07:25  Phos  2.8     09-23  Mg     1.8     09-23    TPro  5.7<L>  /  Alb  2.2<L>  /  TBili  0.4  /  DBili  x   /  AST  24  /  ALT  22  /  AlkPhos  109  09-25          MEDICATIONS:  cefepime   IVPB 2000 milliGRAM(s) IV Intermittent every 8 hours  DAPTOmycin IVPB 650 milliGRAM(s) IV Intermittent every 24 hours  rifAMPin 300 milliGRAM(s) Oral every 12 hours    cyclobenzaprine 10 milliGRAM(s) Oral three times a day PRN  diphenhydrAMINE 50 milliGRAM(s) Oral every 4 hours PRN  gabapentin 300 milliGRAM(s) Oral three times a day  morphine  - Injectable 4 milliGRAM(s) IV Push every 4 hours PRN  morphine  IR 15 milliGRAM(s) Oral every 4 hours PRN  morphine  IR 30 milliGRAM(s) Oral every 4 hours PRN  ondansetron Injectable 4 milliGRAM(s) IV Push every 6 hours PRN  sertraline 25 milliGRAM(s) Oral daily    aspirin enteric coated 81 milliGRAM(s) Oral two times a day  prasugrel 10 milliGRAM(s) Oral daily        ASSESSMENT AND PLAN: 66yo Male s/p right knee abx spacer     1. Analgesic pain control  2. DVT prophylaxis: effient/asa  3. Weight Bearing Status:      NWB right le after plastics removes wound vacs          4. Disposition: rehab  5. Plastics-Plan to remove VAC dressings today and replace with bacitracin, Adaptic, and ABD pads  - Please continue to keep STSG donor sites with Tegaderm  Bilateral VAC dressings removed.  STSG with good take bilaterally.    Wounds redressed with bacitracin to graft, adaptic, ABD pads and tape.  To be changed daily.    Please maintain knee immobilizer to RLE.  OK to bear weight bilaterally from Plastic Surgery perspective.   POST OPERATIVE DAY #:   STATUS POST: Right knee abx spacer                      SUBJECTIVE: Patient seen and examined. Pt. is upset at his food, nursing at night, and just overall upset with the way things are. Pt. still has decreased sensation in both his legs.   Denies any sob/cp/n/v.     OBJECTIVE:     Vital Signs Last 24 Hrs  T(C): 36.7 (25 Sep 2020 05:53), Max: 37.2 (24 Sep 2020 13:36)  T(F): 98 (25 Sep 2020 05:53), Max: 99 (24 Sep 2020 13:36)  HR: 84 (25 Sep 2020 05:53) (68 - 88)  BP: 108/55 (25 Sep 2020 05:53) (92/47 - 108/55)  BP(mean): 73 (25 Sep 2020 05:53) (62 - 74)  RR: 16 (25 Sep 2020 05:53) (14 - 18)  SpO2: 97% (25 Sep 2020 05:53) (93% - 98%)    Affected extremity: right knee         Dressing: clean/dry/intact wound vacs in place b/l les         Sensation: intact to light touch to patient's baseline         Motor exam: EHL/TA/GS   Pulses intact              I&O's Detail    24 Sep 2020 07:01  -  25 Sep 2020 07:00  --------------------------------------------------------  IN:    Oral Fluid: 1930 mL  Total IN: 1930 mL    OUT:    Bulb (mL): 100 mL    Estimated Blood Loss (mL): 0 mL    VAC (Vacuum Assisted Closure) System (mL): 40 mL    VAC (Vacuum Assisted Closure) System (mL): 100 mL    Voided (mL): 1750 mL  Total OUT: 1990 mL    Total NET: -60 mL          LABS:                        8.8    10.82 )-----------( 339      ( 25 Sep 2020 07:25 )             30.4     09-25    134<L>  |  102  |  18  ----------------------------<  123<H>  4.0   |  24  |  0.88    Ca    8.5      25 Sep 2020 07:25  Phos  2.8     09-23  Mg     1.8     09-23    TPro  5.7<L>  /  Alb  2.2<L>  /  TBili  0.4  /  DBili  x   /  AST  24  /  ALT  22  /  AlkPhos  109  09-25          MEDICATIONS:  cefepime   IVPB 2000 milliGRAM(s) IV Intermittent every 8 hours  DAPTOmycin IVPB 650 milliGRAM(s) IV Intermittent every 24 hours  rifAMPin 300 milliGRAM(s) Oral every 12 hours    cyclobenzaprine 10 milliGRAM(s) Oral three times a day PRN  diphenhydrAMINE 50 milliGRAM(s) Oral every 4 hours PRN  gabapentin 300 milliGRAM(s) Oral three times a day  morphine  - Injectable 4 milliGRAM(s) IV Push every 4 hours PRN  morphine  IR 15 milliGRAM(s) Oral every 4 hours PRN  morphine  IR 30 milliGRAM(s) Oral every 4 hours PRN  ondansetron Injectable 4 milliGRAM(s) IV Push every 6 hours PRN  sertraline 25 milliGRAM(s) Oral daily    aspirin enteric coated 81 milliGRAM(s) Oral two times a day  prasugrel 10 milliGRAM(s) Oral daily        ASSESSMENT AND PLAN: 64yo Male s/p right knee abx spacer     1. Analgesic pain control  2. DVT prophylaxis: effient/asa  3. Weight Bearing Status: TTWB right le with KI to start today, plastics ok with weight bearing    4. Disposition: rehab  5. Plastics-Plan to remove VAC dressings today and replace with bacitracin, Adaptic, and ABD pads  6. F/u ESR/CRP on 9/26 q 48h, CBC AND CMP DAILY  7. Hyponatremia 134 been stable, no fluid restriction per cardio  8. H/H stable 8.8/30.4  9. Plastics recs-- Please continue to keep STSG donor sites with Tegaderm  Bilateral VAC dressings removed.  STSG with good take bilaterally.  Wounds redressed with bacitracin to graft, adaptic, ABD pads and tape.  To be changed daily.  Please maintain knee immobilizer to RLE.  OK to bear weight bilaterally from Plastic Surgery perspective.  10. ID recs appreciated currently on dapto, cefepime, and rifampin f/u CPK on 9/28  11. RUQ drainage- f/u cx ngtd  12. Appreciate podiatry recs- diabetic foot ulcers bilaterally currently dressed in gauze  13. Cardio recs   STATUS POST: Right knee abx spacer                      SUBJECTIVE: Patient seen and examined. Pt. is upset at his food, nursing at night, and just overall upset with the way things are. Pt. still has decreased sensation in both his legs.   Denies any sob/cp/n/v.     OBJECTIVE:     Vital Signs Last 24 Hrs  T(C): 36.7 (25 Sep 2020 05:53), Max: 37.2 (24 Sep 2020 13:36)  T(F): 98 (25 Sep 2020 05:53), Max: 99 (24 Sep 2020 13:36)  HR: 84 (25 Sep 2020 05:53) (68 - 88)  BP: 108/55 (25 Sep 2020 05:53) (92/47 - 108/55)  BP(mean): 73 (25 Sep 2020 05:53) (62 - 74)  RR: 16 (25 Sep 2020 05:53) (14 - 18)  SpO2: 97% (25 Sep 2020 05:53) (93% - 98%)     RLE:bacitracin to graft, adaptic, ABD pads and tape with KI  DSG: Pulses: +DP;  feet cool to touch; at baseline; no edema  Sensation: Sensation intact to baseline (diminished sensation- h/o severe neuropathy; follows with vascular Dr. Malloy outpatient)  Motor: 5/5 EHL/FHL; limiting dorsi and plantar flexion on R as per plastics    LLE:  DSG: bacitracin to graft, adaptic, ABD pads and tape  Pulses: feet cool to touch; at baseline; no edema  Sensation: SILT to baseline (diminished sensation- h/o severe neuropathy; follows with vascular Dr. Malloy outpatient)  Motor: 5/5 EHL/FHL/TA/GS               I&O's Detail    24 Sep 2020 07:01  -  25 Sep 2020 07:00  --------------------------------------------------------  IN:    Oral Fluid: 1930 mL  Total IN: 1930 mL    OUT:    Bulb (mL): 100 mL    Estimated Blood Loss (mL): 0 mL    VAC (Vacuum Assisted Closure) System (mL): 40 mL    VAC (Vacuum Assisted Closure) System (mL): 100 mL    Voided (mL): 1750 mL  Total OUT: 1990 mL    Total NET: -60 mL          LABS:                        8.8    10.82 )-----------( 339      ( 25 Sep 2020 07:25 )             30.4     09-25    134<L>  |  102  |  18  ----------------------------<  123<H>  4.0   |  24  |  0.88    Ca    8.5      25 Sep 2020 07:25  Phos  2.8     09-23  Mg     1.8     09-23    TPro  5.7<L>  /  Alb  2.2<L>  /  TBili  0.4  /  DBili  x   /  AST  24  /  ALT  22  /  AlkPhos  109  09-25          MEDICATIONS:  cefepime   IVPB 2000 milliGRAM(s) IV Intermittent every 8 hours  DAPTOmycin IVPB 650 milliGRAM(s) IV Intermittent every 24 hours  rifAMPin 300 milliGRAM(s) Oral every 12 hours    cyclobenzaprine 10 milliGRAM(s) Oral three times a day PRN  diphenhydrAMINE 50 milliGRAM(s) Oral every 4 hours PRN  gabapentin 300 milliGRAM(s) Oral three times a day  morphine  - Injectable 4 milliGRAM(s) IV Push every 4 hours PRN  morphine  IR 15 milliGRAM(s) Oral every 4 hours PRN  morphine  IR 30 milliGRAM(s) Oral every 4 hours PRN  ondansetron Injectable 4 milliGRAM(s) IV Push every 6 hours PRN  sertraline 25 milliGRAM(s) Oral daily    aspirin enteric coated 81 milliGRAM(s) Oral two times a day  prasugrel 10 milliGRAM(s) Oral daily        ASSESSMENT AND PLAN: 66yo Male s/p right knee abx spacer     1. Analgesic pain control  2. DVT prophylaxis: effient/asa  3. Weight Bearing Status: TTWB right le with KI to start today, plastics ok with weight bearing    4. Disposition: rehab  5. Plastics-Plan to remove VAC dressings today and replace with bacitracin, Adaptic, and ABD pads  6. F/u ESR/CRP on 9/26 q 48h, CBC AND CMP DAILY  7. Hyponatremia 134 been stable, no fluid restriction per cardio  8. H/H stable 8.8/30.4  9. Plastics recs-- Please continue to keep STSG donor sites with Tegaderm  Bilateral VAC dressings removed.  STSG with good take bilaterally.  Wounds redressed with bacitracin to graft, adaptic, ABD pads and tape.  To be changed daily.  Please maintain knee immobilizer to RLE.  OK to bear weight bilaterally from Plastic Surgery perspective.  10. ID recs appreciated currently on dapto, cefepime, and rifampin f/u CPK on 9/28- DC cefepime today  11. RUQ drainage- f/u cx ngtd  12. Appreciate podiatry recs-  Right foot found to have Rose grade 1 ulcers on 2nd and 3rd digits.  Applied Bactroban for ulcer sites on R & L foot 2nd digit  Dressed ulcer of L foot with cling/ DSD and R foot with a band-aid  13. Cardio recs- . He had a stent placed less than two years ago. continue prasugrel and aspirin.   Ventiricular tachycardia/Defibrillator- asymptomatic. Off monitor.   Hyponatremia- mild-agree with discontinuing fluid restriction  D/w Dr. Caldera about hypotension. pt. comfortable, low EF and asx will continue to monitor as patient run low bp wise.

## 2020-09-25 NOTE — PROGRESS NOTE ADULT - ASSESSMENT
A/P 65M s/p right gastroc and left LE debridement with STSG and vac placement   - Plan to remove VAC dressings today and replace with bacitracin, Adaptic, and ABD pads  - Please continue to keep STSG donor sites with Tegaderm  - Patient should not bear weight on RLE after VAC removal

## 2020-09-25 NOTE — PROGRESS NOTE ADULT - ASSESSMENT
Patient is a 65y old  Male who presents with a chief complaint of R Knee Swelling scheduled for a R knee abx spacer exchange and gastroc flap procedure s/p pyogenic arthritis this Friday. He has requested a podiatry consult for diabetic foot ulcers on the right foot. Right foot found to have Rose grade 1 ulcers on 2nd and 3rd digits.    Plan:  Applied Bactroban for ulcer sites on R & L foot 2nd digit  Dressed ulcer of L foot with cling/ DSD and R foot with a band-aid  Plan discussed with Dr. Davalos  Podiatry will continue to monitor

## 2020-09-25 NOTE — PROGRESS NOTE ADULT - SUBJECTIVE AND OBJECTIVE BOX
No acute issues overnight, no more vomiting.    Mild secretions are coming from RUQ.    AVSS, afebrile    MEDICATIONS  (STANDING):  aspirin enteric coated 81 milliGRAM(s) Oral two times a day  BACItracin   Ointment 1 Application(s) Topical daily  chlorhexidine 2% Cloths 1 Application(s) Topical <User Schedule>  DAPTOmycin IVPB 650 milliGRAM(s) IV Intermittent every 24 hours  dextrose 5%. 1000 milliLiter(s) (50 mL/Hr) IV Continuous <Continuous>  dextrose 50% Injectable 12.5 Gram(s) IV Push once  dextrose 50% Injectable 25 Gram(s) IV Push once  dextrose 50% Injectable 25 Gram(s) IV Push once  digoxin     Tablet 0.125 milliGRAM(s) Oral daily  gabapentin 300 milliGRAM(s) Oral three times a day  insulin glargine Injectable (LANTUS) 20 Unit(s) SubCutaneous at bedtime  insulin lispro (HumaLOG) corrective regimen sliding scale   SubCutaneous Before meals and at bedtime  insulin lispro Injectable (HumaLOG) 8 Unit(s) SubCutaneous three times a day before meals  metoprolol succinate ER 25 milliGRAM(s) Oral daily  nystatin Cream 1 Application(s) Topical two times a day  polyethylene glycol 3350 17 Gram(s) Oral daily  prasugrel 10 milliGRAM(s) Oral daily  rifAMPin 300 milliGRAM(s) Oral every 12 hours  rosuvastatin 10 milliGRAM(s) Oral at bedtime  sertraline 25 milliGRAM(s) Oral daily  spironolactone 25 milliGRAM(s) Oral daily  tamsulosin 0.4 milliGRAM(s) Oral at bedtime    MEDICATIONS  (PRN):  cyclobenzaprine 10 milliGRAM(s) Oral three times a day PRN Muscle Spasm  dextrose 40% Gel 15 Gram(s) Oral once PRN Blood Glucose LESS THAN 70 milliGRAM(s)/deciliter  diphenhydrAMINE 50 milliGRAM(s) Oral every 4 hours PRN Rash and/or Itching  glucagon  Injectable 1 milliGRAM(s) IntraMuscular once PRN Glucose LESS THAN 70 milligrams/deciliter  morphine  - Injectable 4 milliGRAM(s) IV Push every 4 hours PRN breakthrough pain  morphine  IR 15 milliGRAM(s) Oral every 4 hours PRN Moderate Pain (4 - 6)  morphine  IR 30 milliGRAM(s) Oral every 4 hours PRN Severe Pain (7 - 10)  naloxone Injectable 0.1 milliGRAM(s) IV Push every 3 minutes PRN For ANY of the following changes in patient status:  A. RR LESS THAN 10 breaths per minute, B. Oxygen saturation LESS THAN 90%, C. Sedation score of 6  ondansetron Injectable 4 milliGRAM(s) IV Push every 6 hours PRN Nausea  senna 2 Tablet(s) Oral at bedtime PRN Constipation  sodium chloride 0.9% lock flush 10 milliLiter(s) IV Push every 1 hour PRN Pre/post blood products, medications, blood draw, and to maintain line patency      Vital Signs Last 24 Hrs  T(C): 36.3 (25 Sep 2020 11:43), Max: 36.9 (25 Sep 2020 01:05)  T(F): 97.4 (25 Sep 2020 11:43), Max: 98.5 (25 Sep 2020 01:05)  HR: 78 (25 Sep 2020 11:43) (68 - 88)  BP: 84/54 (25 Sep 2020 11:43) (84/54 - 108/55)  BP(mean): 73 (25 Sep 2020 05:53) (62 - 74)  RR: 17 (25 Sep 2020 11:43) (14 - 18)  SpO2: 94% (25 Sep 2020 11:43) (93% - 98%)    Gen: NAD, resting comfortably in bed  Pulm: Good inspiratory effort, nonlabored breathing  C/V: NSR  Abd: Soft, non tender, nondistended. No rebound, no guarding. RUQ some discharge is seen that is thinner than before mixed with serous secretion and not excessive, the area around in non erythematous nor tender  Extrem: WWP, no edema                          8.8    10.82 )-----------( 339      ( 25 Sep 2020 07:25 )             30.4   09-25    134<L>  |  102  |  18  ----------------------------<  123<H>  4.0   |  24  |  0.88    Ca    8.5      25 Sep 2020 07:25    TPro  5.7<L>  /  Alb  2.2<L>  /  TBili  0.4  /  DBili  x   /  AST  24  /  ALT  22  /  AlkPhos  109  09-25  Culture - Fungal, Body Fluid (09.25.20 @ 00:57)    Specimen Source: .Body Fluid Abdominal Fluid    Culture Results:   Testing in progress

## 2020-09-25 NOTE — PROGRESS NOTE ADULT - ASSESSMENT
65M w/ PMHx of CAD s/p CABG, CHF, DM, HLD, HTN, acute cholecystitis s/p perc sudhir 2/28 (drain removed in late May 2020), recent R TKA 7/22 presented to [facility] with R knee infection s/p R knee hardware exchange on 9/4 and R knee spacer w/ flap from plastics on 9/18.    We did bedside I&D of the RUQ on 09/1/2020       General surgery re-consulted for continued purulent drainage coming from his prior MIS site.     Patient has no pain, his abdominal exam is benign not a much discharge was seen, but was thinner than before, mixed with serous fluid  Had vomited twice last night    Afebrile, HDS. WBC 10 from 11 Normal LFTs RUQ ultrasound showed cholelithiasis    On cefepime and daptomycin by ID    09/23/2020 CT A/P was done and showed  Interval decrease in extent of trace fluid around the GB      Discussed with Dr. Latif and Chief resident:      - Daily dressing  - Surgery team 4 will continue to FU

## 2020-09-25 NOTE — PROGRESS NOTE ADULT - ATTENDING COMMENTS
I have reviewed the medical record, including laboratory and radiographic studies, interviewed and examined the patient and discussed the plan with Dr. Call, the ID Resident.  Agree with above. Will continue to follow with you – ID Team 1.

## 2020-09-25 NOTE — PROGRESS NOTE ADULT - SUBJECTIVE AND OBJECTIVE BOX
Interval HPI/overnight events: ANAHI overnight. Patient seen and examined at bedside, no acute complaints    VITAL SIGNS:  Vital Signs Last 24 Hrs  T(C): 36.3 (25 Sep 2020 11:43), Max: 36.9 (25 Sep 2020 01:05)  T(F): 97.4 (25 Sep 2020 11:43), Max: 98.5 (25 Sep 2020 01:05)  HR: 78 (25 Sep 2020 11:43) (68 - 88)  BP: 84/54 (25 Sep 2020 11:43) (84/54 - 108/55)  BP(mean): 73 (25 Sep 2020 05:53) (62 - 74)  RR: 17 (25 Sep 2020 11:43) (14 - 18)  SpO2: 94% (25 Sep 2020 11:43) (93% - 98%)    PHYSICAL EXAM:  General: in NAD, lying comfortably in bed  HEENT: normocephalic, atraumatic; PERRL, anicteric sclera; MMM  Neck: supple, no JVD, no thyromegaly, no lymphadenopathy  Cardiovascular: +S1/S2, RRR, no M/G/R  Respiratory: clear to auscultation B/L; no wheezing, no rales, no rhonchi  Gastrointestinal: soft, NT/ND; +BSx4, +RUQ dressing stained with yellow output  Extremities: WWP; +RLE in immobilizer, drain with bloody output. +LLE wound vac draining serosanguinous output, L foot wrapped, dressing c/d/i  Vascular: 2+ radial, DP/PT pulses B/L  Neurological: no focal deficits    MEDICATIONS:  MEDICATIONS  (STANDING):  aspirin enteric coated 81 milliGRAM(s) Oral two times a day  BACItracin   Ointment 1 Application(s) Topical daily  chlorhexidine 2% Cloths 1 Application(s) Topical <User Schedule>  DAPTOmycin IVPB 650 milliGRAM(s) IV Intermittent every 24 hours  dextrose 5%. 1000 milliLiter(s) (50 mL/Hr) IV Continuous <Continuous>  dextrose 50% Injectable 12.5 Gram(s) IV Push once  dextrose 50% Injectable 25 Gram(s) IV Push once  dextrose 50% Injectable 25 Gram(s) IV Push once  digoxin     Tablet 0.125 milliGRAM(s) Oral daily  gabapentin 300 milliGRAM(s) Oral three times a day  insulin glargine Injectable (LANTUS) 20 Unit(s) SubCutaneous at bedtime  insulin lispro (HumaLOG) corrective regimen sliding scale   SubCutaneous Before meals and at bedtime  insulin lispro Injectable (HumaLOG) 8 Unit(s) SubCutaneous three times a day before meals  metoprolol succinate ER 25 milliGRAM(s) Oral daily  nystatin Cream 1 Application(s) Topical two times a day  polyethylene glycol 3350 17 Gram(s) Oral daily  prasugrel 10 milliGRAM(s) Oral daily  rifAMPin 300 milliGRAM(s) Oral every 12 hours  rosuvastatin 10 milliGRAM(s) Oral at bedtime  sertraline 25 milliGRAM(s) Oral daily  spironolactone 25 milliGRAM(s) Oral daily  tamsulosin 0.4 milliGRAM(s) Oral at bedtime    MEDICATIONS  (PRN):  cyclobenzaprine 10 milliGRAM(s) Oral three times a day PRN Muscle Spasm  dextrose 40% Gel 15 Gram(s) Oral once PRN Blood Glucose LESS THAN 70 milliGRAM(s)/deciliter  diphenhydrAMINE 50 milliGRAM(s) Oral every 4 hours PRN Rash and/or Itching  glucagon  Injectable 1 milliGRAM(s) IntraMuscular once PRN Glucose LESS THAN 70 milligrams/deciliter  morphine  - Injectable 4 milliGRAM(s) IV Push every 4 hours PRN breakthrough pain  morphine  IR 15 milliGRAM(s) Oral every 4 hours PRN Moderate Pain (4 - 6)  morphine  IR 30 milliGRAM(s) Oral every 4 hours PRN Severe Pain (7 - 10)  naloxone Injectable 0.1 milliGRAM(s) IV Push every 3 minutes PRN For ANY of the following changes in patient status:  A. RR LESS THAN 10 breaths per minute, B. Oxygen saturation LESS THAN 90%, C. Sedation score of 6  ondansetron Injectable 4 milliGRAM(s) IV Push every 6 hours PRN Nausea  senna 2 Tablet(s) Oral at bedtime PRN Constipation  sodium chloride 0.9% lock flush 10 milliLiter(s) IV Push every 1 hour PRN Pre/post blood products, medications, blood draw, and to maintain line patency      ALLERGIES:  Allergies    ACE inhibitors (Hives)  carvedilol (Other)  enalapril (Hives)  Entresto (Other)    Intolerances        LABS:                         8.8    10.82 )-----------( 339      ( 25 Sep 2020 07:25 )             30.4     09-25    134<L>  |  102  |  18  ----------------------------<  123<H>  4.0   |  24  |  0.88    Ca    8.5      25 Sep 2020 07:25    TPro  5.7<L>  /  Alb  2.2<L>  /  TBili  0.4  /  DBili  x   /  AST  24  /  ALT  22  /  AlkPhos  109  09-25          Culture - Body Fluid with Gram Stain (09.24.20 @ 13:40)   Gram Stain:   No organisms seen   Rare WBC's     Culture - Acid Fast - Body Fluid w/Smear (09.25.20 @ 00:58)   Specimen Source: .Body Fluid Abdominal Fluid   Acid Fast Bacilli Smear:   No acid fast bacilli seen by fluorochrome stain         RADIOLOGY, EKG & ADDITIONAL TESTS: Reviewed.

## 2020-09-25 NOTE — PROGRESS NOTE ADULT - SUBJECTIVE AND OBJECTIVE BOX
Plastic Surgery    SUBJECTIVE:   Pain seems better controlled. Patient remains on bedrest    VITALS  T(C): 36.7 (09-25-20 @ 05:53), Max: 37.2 (09-24-20 @ 13:36)  HR: 84 (09-25-20 @ 05:53) (68 - 88)  BP: 108/55 (09-25-20 @ 05:53) (92/47 - 108/55)  RR: 16 (09-25-20 @ 05:53) (14 - 18)  SpO2: 97% (09-25-20 @ 05:53) (93% - 98%)  CAPILLARY BLOOD GLUCOSE      POCT Blood Glucose.: 116 mg/dL (25 Sep 2020 07:36)  POCT Blood Glucose.: 213 mg/dL (24 Sep 2020 22:19)  POCT Blood Glucose.: 197 mg/dL (24 Sep 2020 17:48)  POCT Blood Glucose.: 142 mg/dL (24 Sep 2020 11:40)  POCT Blood Glucose.: 115 mg/dL (24 Sep 2020 08:01)      Is/Os    09-24 @ 07:01  -  09-25 @ 07:00  --------------------------------------------------------  IN:    Oral Fluid: 1930 mL  Total IN: 1930 mL    OUT:    Bulb (mL): 100 mL    Estimated Blood Loss (mL): 0 mL    VAC (Vacuum Assisted Closure) System (mL): 40 mL    VAC (Vacuum Assisted Closure) System (mL): 100 mL    Voided (mL): 1750 mL  Total OUT: 1990 mL    Total NET: -60 mL          PHYSICAL EXAM:   General: NAD, Lying in bed comfortably  Extrem: RLE with knee immobilizer in place, VAC holding suction.  STSG donor site open to air and epithelializing  LLE with VAC in place holding suction.  STSG donor site with tegaderms      MEDICATIONS (STANDING): aspirin enteric coated 81 milliGRAM(s) Oral two times a day  cefepime   IVPB 2000 milliGRAM(s) IV Intermittent every 8 hours  DAPTOmycin IVPB 650 milliGRAM(s) IV Intermittent every 24 hours  dextrose 5%. 1000 milliLiter(s) IV Continuous <Continuous>  dextrose 50% Injectable 12.5 Gram(s) IV Push once  dextrose 50% Injectable 25 Gram(s) IV Push once  dextrose 50% Injectable 25 Gram(s) IV Push once  digoxin     Tablet 0.125 milliGRAM(s) Oral daily  gabapentin 300 milliGRAM(s) Oral three times a day  insulin glargine Injectable (LANTUS) 20 Unit(s) SubCutaneous at bedtime  insulin lispro (HumaLOG) corrective regimen sliding scale   SubCutaneous Before meals and at bedtime  insulin lispro Injectable (HumaLOG) 8 Unit(s) SubCutaneous three times a day before meals  metoprolol succinate ER 25 milliGRAM(s) Oral daily  polyethylene glycol 3350 17 Gram(s) Oral daily  prasugrel 10 milliGRAM(s) Oral daily  rifAMPin 300 milliGRAM(s) Oral every 12 hours  rosuvastatin 10 milliGRAM(s) Oral at bedtime  sertraline 25 milliGRAM(s) Oral daily  spironolactone 25 milliGRAM(s) Oral daily  tamsulosin 0.4 milliGRAM(s) Oral at bedtime    MEDICATIONS (PRN):cyclobenzaprine 10 milliGRAM(s) Oral three times a day PRN Muscle Spasm  dextrose 40% Gel 15 Gram(s) Oral once PRN Blood Glucose LESS THAN 70 milliGRAM(s)/deciliter  diphenhydrAMINE 50 milliGRAM(s) Oral every 4 hours PRN Rash and/or Itching  glucagon  Injectable 1 milliGRAM(s) IntraMuscular once PRN Glucose LESS THAN 70 milligrams/deciliter  morphine  - Injectable 4 milliGRAM(s) IV Push every 4 hours PRN breakthrough pain  morphine  IR 15 milliGRAM(s) Oral every 4 hours PRN Moderate Pain (4 - 6)  morphine  IR 30 milliGRAM(s) Oral every 4 hours PRN Severe Pain (7 - 10)  naloxone Injectable 0.1 milliGRAM(s) IV Push every 3 minutes PRN For ANY of the following changes in patient status:  A. RR LESS THAN 10 breaths per minute, B. Oxygen saturation LESS THAN 90%, C. Sedation score of 6  ondansetron Injectable 4 milliGRAM(s) IV Push every 6 hours PRN Nausea  senna 2 Tablet(s) Oral at bedtime PRN Constipation  sodium chloride 0.9% lock flush 10 milliLiter(s) IV Push every 1 hour PRN Pre/post blood products, medications, blood draw, and to maintain line patency      LABS  CBC (09-25 @ 07:25)                              8.8<L>                         10.82<H>  )----------------(  339        75.1  % Neutrophils, 8.1<L>% Lymphocytes, ANC: 8.13<H>                              30.4<L>  CBC (09-24 @ 06:55)                              8.4<L>                         11.30<H>  )----------------(  300        75.2  % Neutrophils, 7.6<L>% Lymphocytes, ANC: 8.49<H>                              29.6<L>    BMP (09-24 @ 06:55)             134<L>  |  102     |  18    		Ca++ --      Ca 8.1<L>             ---------------------------------( 108<H>		Mg --                 4.0     |  21<L>   |  0.94  			Ph --        LFTs (09-24 @ 06:55)      TPro 6.1 / Alb 2.4<L> / TBili 0.5 / DBili -- / AST 26 / ALT 24 / AlkPhos 112              IMAGING STUDIES

## 2020-09-25 NOTE — PROGRESS NOTE ADULT - SUBJECTIVE AND OBJECTIVE BOX
Patient is a 65y old  Male who presents with a chief complaint of R Knee Swelling (25 Sep 2020 08:18)      INTERVAL HPI/ OVERNIGHT EVENTS      LABS                        8.8    10.82 )-----------( 339      ( 25 Sep 2020 07:25 )             30.4     09-25    134<L>  |  102  |  18  ----------------------------<  123<H>  4.0   |  24  |  0.88    Ca    8.5      25 Sep 2020 07:25    TPro  5.7<L>  /  Alb  2.2<L>  /  TBili  0.4  /  DBili  x   /  AST  24  /  ALT  22  /  AlkPhos  109  09-25        ICU Vital Signs Last 24 Hrs  T(C): 36.7 (25 Sep 2020 05:53), Max: 37.2 (24 Sep 2020 13:36)  T(F): 98 (25 Sep 2020 05:53), Max: 99 (24 Sep 2020 13:36)  HR: 84 (25 Sep 2020 05:53) (68 - 88)  BP: 108/55 (25 Sep 2020 05:53) (92/47 - 108/55)  BP(mean): 73 (25 Sep 2020 05:53) (62 - 74)  ABP: --  ABP(mean): --  RR: 16 (25 Sep 2020 05:53) (14 - 18)  SpO2: 97% (25 Sep 2020 05:53) (93% - 98%)      RADIOLOGY    MICROBIOLOGY    PHYSICAL EXAM  Lower Extremity Focused  Vasc:  Derm:  Neuro:  MSK: Patient is a 65y old  Male who presents with a chief complaint of R Knee Swelling (25 Sep 2020 08:18)      INTERVAL HPI/ OVERNIGHT EVENTS: No overnight events reported. Pt seen and examined at bedside. The patient's dressings were not on this morning and the patient does not have any recollection of who took it off and why it was taken off. He states that he would be happier with a less bulkier dressing.       LABS                        8.8    10.82 )-----------( 339      ( 25 Sep 2020 07:25 )             30.4     09-25    134<L>  |  102  |  18  ----------------------------<  123<H>  4.0   |  24  |  0.88    Ca    8.5      25 Sep 2020 07:25    TPro  5.7<L>  /  Alb  2.2<L>  /  TBili  0.4  /  DBili  x   /  AST  24  /  ALT  22  /  AlkPhos  109  09-25        ICU Vital Signs Last 24 Hrs  T(C): 36.7 (25 Sep 2020 05:53), Max: 37.2 (24 Sep 2020 13:36)  T(F): 98 (25 Sep 2020 05:53), Max: 99 (24 Sep 2020 13:36)  HR: 84 (25 Sep 2020 05:53) (68 - 88)  BP: 108/55 (25 Sep 2020 05:53) (92/47 - 108/55)  BP(mean): 73 (25 Sep 2020 05:53) (62 - 74)  ABP: --  ABP(mean): --  RR: 16 (25 Sep 2020 05:53) (14 - 18)  SpO2: 97% (25 Sep 2020 05:53) (93% - 98%)      RADIOLOGY  < from: Xray Foot AP + Lateral, Bilateral (09.16.20 @ 14:16) >  Examination of the bilateral feet demonstrates severe arthrosis in the right first PIP joint, with valgus deformity. Calcaneal spurs no fractures. Internal fixation in the left first PIP joint. Soft tissue swelling in the plantar aspect of the left midfoot, without associated adjacent osseous abnormality. No fractures.    < end of copied text >      MICROBIOLOGY    PHYSICAL EXAM  Lower Extremity Focused  Vasc: DP/PT 1-4 b/l, +2 nisa malleolar and dorsum of the foot pitting edema b/l, CFT <3sec b/l  Derm: Right foot - 2nd digit ulcer at tip of toe, no drainage, no malodor, hallux maceration improving; small circular lesions present throughout his leg d/t to injuries related to his cast, Tinea Pedia b/l, onychomycosis. L foot: ulcer on the distal plantar aspect of the 2nd digit  Neuro: Protective sensations not intact from the distal tibia down (stocking distribution)  MSK: Partial amp of digits 2 and 3 on L foot, hammertoes on digits 2-5 on R foot, Hallux rigidus b/l

## 2020-09-25 NOTE — PROGRESS NOTE ADULT - SUBJECTIVE AND OBJECTIVE BOX
Ortho Note    Pt comfortable this AM, complained of some pain overnight but has since improved. Excited to be off bedrest today.  Denies CP, SOB, N/V, numbness/tingling     Vital Signs Last 24 Hrs  T(C): 36.7 (25 Sep 2020 05:53), Max: 37.2 (24 Sep 2020 13:36)  T(F): 98 (25 Sep 2020 05:53), Max: 99 (24 Sep 2020 13:36)  HR: 84 (25 Sep 2020 05:53) (68 - 88)  BP: 108/55 (25 Sep 2020 05:53) (92/47 - 108/55)  BP(mean): 73 (25 Sep 2020 05:53) (62 - 74)  RR: 16 (25 Sep 2020 05:53) (14 - 18)  SpO2: 97% (25 Sep 2020 05:53) (93% - 98%)    RLE:  DSG: R knee Wound vac holding suction at 125, MIS x1, in KI, proximal donor site uncovered, healing ; kerlix to R foot debridement site (podiatry dsg)  Pulses: +DP;  feet cool to touch; at baseline; no edema  Sensation: Sensation intact to baseline (diminished sensation- h/o severe neuropathy; follows with vascular Dr. Malloy outpatient)  Motor: 5/5 EHL/FHL; limiting dorsi and plantar flexion on R as per plastics    LLE:  DSG: Wound vac holding suction at 125; tegaderm to proximal donor site CDI; kerlix to L foot debridement site CDI  Pulses: feet cool to touch; at baseline; no edema  Sensation: SILT to baseline (diminished sensation- h/o severe neuropathy; follows with vascular Dr. Malloy outpatient)  Motor: 5/5 EHL/FHL/TA/GS                        8.4    10.37 )-----------( 278      ( 23 Sep 2020 08:28 )             28.9   23 Sep 2020 08:28    133    |  103    |  20     ----------------------------<  188    3.8     |  22     |  0.92     Ca    8.2        23 Sep 2020 08:28  Phos  2.8       23 Sep 2020 08:28  Mg     1.8       23 Sep 2020 08:28    TPro  5.5    /  Alb  2.1    /  TBili  0.5    /  DBili  x      /  AST  21     /  ALT  23     /  AlkPhos  110    23 Sep 2020 08:28      A/P: 65yMale admitted for R knee pain, drainage s/p right knee arthroscopic I+D 7/17, right knee explant and abx spacer 7/22 with Dr. Castro;  and debridement of LLE wound with wound vac placement on 7/30 by Dr. Bowen. Was original d/c'd home on 8/10/20 with PICC and vancomycin IV q12h + rifampin.  Readmitted, and now s/p R hip exchange of hardware on 09/04/2020; I+D of RUQ on 09/04/2020; R Knee Revision Antibiotic Spacer by Dr. Castro, R Knee medial gastroc flap and LLE STSG by Dr. Mckeon on 9/18  - afebrile since 9/17/20 when workup for sepsis was negative (BC NGTD, elevated WBC resolved; surgical wounds did not appear infected; UA negative)  -ESR/CRP elevated yesterday which was expected 72h post-op.   - diabetes mellitus II with hyperglycemia- Patient has been on sliding scale post-op through the weekend with elevated sugars (250-350). Was well-controlled pre-op. Sugars better controlled since placed on lantus and pre-meal insulin yesterday  - protein calorie malnutrition/ uncontrolled diabetes- Nutrition services consulted, appreciate recs; continue glucerna shakes  - Hypophosphatemia-resolved, 2.8 today after oral supplementation  - hyponatremia- stable at 133, 134; fluid restriction discountinued as per internal medicine recs  - H+H 8.4/28.9- S/p 3units PRBCs in OR on Friday 9/19 (H+H day of surgery 9.2/33.0), and close ICU monitoring. Currently asymptomatic and at baseline of Hgb levels from prior admissions. Continue to monitor labs; transfuse for Hgb < 8.0.   - Pain Control- appreciate pain management recs, gabapentin increased to tid; Avoiding long-term NSAIDs in the setting of CAD. Avoiding standing tylenol in the setting of elevated liver enzymes in the past while on rifampin  - DVT ppx: ASA bid+ effient (home med)  - Can be off strict bedrest today; TTWB RLE in KI, WBAT LLE  - aggressive I/S, bowel regimen (reports regular BMs since surgery 9/18)  - appreciate ID recs- Continue dapto, cefepime, rifampin; f/u CPK with dapto on 9/28 (elevated at 209 today from 60 on 9/14); f/o CBC with diff and CMP (elevated liver enzymes in past with rifampin);  - appreciate internal medicine, and cardiology recs   - appreciate plastics recs- Wound vacs will be removed today by plastics; continue MIS x 1 for now and monitor output.   - RUQ drainage-  thick yellow drainage from RUQ I+D site. Still denies abd pain, N/V. No eythema at site. Drainage remains the same consistency and appearance as last 3 days-Cultures were sent and are negative. Gen surgery team 4 following. Ultrasound ordered as per gen surg team done 9/22 inconclusive. CT of abdomen ordered as per gen surg recs; Gen surg recommending dressing changes only  - RUE swelling. No erythema or drainage. RUE Doppler 9/23 negative for DVT  - appreciate psych recs- currently feels well on regimen today, will reconsult as needed  - R diabetic foot ulcers x 2- debrided 9/16 by podiatry; L ulcer debrided  9/22- dsg changes and care as per podiatry  - dispo: pending medical stabilization/ OOB status Friday; likely PINKY    Ortho Pager [phone]

## 2020-09-26 LAB
ALBUMIN SERPL ELPH-MCNC: 2.4 G/DL — LOW (ref 3.3–5)
ALP SERPL-CCNC: 124 U/L — HIGH (ref 40–120)
ALT FLD-CCNC: 21 U/L — SIGNIFICANT CHANGE UP (ref 10–45)
ANION GAP SERPL CALC-SCNC: 9 MMOL/L — SIGNIFICANT CHANGE UP (ref 5–17)
AST SERPL-CCNC: 23 U/L — SIGNIFICANT CHANGE UP (ref 10–40)
BILIRUB SERPL-MCNC: 0.4 MG/DL — SIGNIFICANT CHANGE UP (ref 0.2–1.2)
BUN SERPL-MCNC: 16 MG/DL — SIGNIFICANT CHANGE UP (ref 7–23)
CALCIUM SERPL-MCNC: 8.2 MG/DL — LOW (ref 8.4–10.5)
CHLORIDE SERPL-SCNC: 103 MMOL/L — SIGNIFICANT CHANGE UP (ref 96–108)
CO2 SERPL-SCNC: 25 MMOL/L — SIGNIFICANT CHANGE UP (ref 22–31)
CREAT SERPL-MCNC: 0.91 MG/DL — SIGNIFICANT CHANGE UP (ref 0.5–1.3)
CRP SERPL-MCNC: 5.81 MG/DL — HIGH (ref 0–0.4)
CULTURE RESULTS: NO GROWTH — SIGNIFICANT CHANGE UP
ERYTHROCYTE [SEDIMENTATION RATE] IN BLOOD: 48 MM/HR — HIGH
GLUCOSE BLDC GLUCOMTR-MCNC: 115 MG/DL — HIGH (ref 70–99)
GLUCOSE BLDC GLUCOMTR-MCNC: 116 MG/DL — HIGH (ref 70–99)
GLUCOSE BLDC GLUCOMTR-MCNC: 118 MG/DL — HIGH (ref 70–99)
GLUCOSE BLDC GLUCOMTR-MCNC: 131 MG/DL — HIGH (ref 70–99)
GLUCOSE BLDC GLUCOMTR-MCNC: 58 MG/DL — LOW (ref 70–99)
GLUCOSE BLDC GLUCOMTR-MCNC: 61 MG/DL — LOW (ref 70–99)
GLUCOSE BLDC GLUCOMTR-MCNC: 67 MG/DL — LOW (ref 70–99)
GLUCOSE BLDC GLUCOMTR-MCNC: 76 MG/DL — SIGNIFICANT CHANGE UP (ref 70–99)
GLUCOSE SERPL-MCNC: 138 MG/DL — HIGH (ref 70–99)
HCT VFR BLD CALC: 30.5 % — LOW (ref 39–50)
HGB BLD-MCNC: 8.7 G/DL — LOW (ref 13–17)
MCHC RBC-ENTMCNC: 22 PG — LOW (ref 27–34)
MCHC RBC-ENTMCNC: 28.5 GM/DL — LOW (ref 32–36)
MCV RBC AUTO: 77 FL — LOW (ref 80–100)
NRBC # BLD: 0 /100 WBCS — SIGNIFICANT CHANGE UP (ref 0–0)
PLATELET # BLD AUTO: 432 K/UL — HIGH (ref 150–400)
POTASSIUM SERPL-MCNC: 4.4 MMOL/L — SIGNIFICANT CHANGE UP (ref 3.5–5.3)
POTASSIUM SERPL-SCNC: 4.4 MMOL/L — SIGNIFICANT CHANGE UP (ref 3.5–5.3)
PROT SERPL-MCNC: 6.2 G/DL — SIGNIFICANT CHANGE UP (ref 6–8.3)
RBC # BLD: 3.96 M/UL — LOW (ref 4.2–5.8)
RBC # FLD: 23.7 % — HIGH (ref 10.3–14.5)
SODIUM SERPL-SCNC: 137 MMOL/L — SIGNIFICANT CHANGE UP (ref 135–145)
SPECIMEN SOURCE: SIGNIFICANT CHANGE UP
WBC # BLD: 12.17 K/UL — HIGH (ref 3.8–10.5)
WBC # FLD AUTO: 12.17 K/UL — HIGH (ref 3.8–10.5)

## 2020-09-26 PROCEDURE — 99232 SBSQ HOSP IP/OBS MODERATE 35: CPT

## 2020-09-26 RX ORDER — GLUCAGON INJECTION, SOLUTION 0.5 MG/.1ML
1 INJECTION, SOLUTION SUBCUTANEOUS ONCE
Refills: 0 | Status: DISCONTINUED | OUTPATIENT
Start: 2020-09-26 | End: 2020-10-03

## 2020-09-26 RX ORDER — MUPIROCIN 20 MG/G
1 OINTMENT TOPICAL ONCE
Refills: 0 | Status: COMPLETED | OUTPATIENT
Start: 2020-09-26 | End: 2020-09-26

## 2020-09-26 RX ORDER — SIMETHICONE 80 MG/1
80 TABLET, CHEWABLE ORAL THREE TIMES A DAY
Refills: 0 | Status: DISCONTINUED | OUTPATIENT
Start: 2020-09-26 | End: 2020-10-03

## 2020-09-26 RX ORDER — SODIUM CHLORIDE 9 MG/ML
1000 INJECTION, SOLUTION INTRAVENOUS
Refills: 0 | Status: DISCONTINUED | OUTPATIENT
Start: 2020-09-26 | End: 2020-10-03

## 2020-09-26 RX ORDER — DEXTROSE 50 % IN WATER 50 %
15 SYRINGE (ML) INTRAVENOUS ONCE
Refills: 0 | Status: DISCONTINUED | OUTPATIENT
Start: 2020-09-26 | End: 2020-10-03

## 2020-09-26 RX ORDER — METOCLOPRAMIDE HCL 10 MG
5 TABLET ORAL EVERY 8 HOURS
Refills: 0 | Status: DISCONTINUED | OUTPATIENT
Start: 2020-09-26 | End: 2020-10-03

## 2020-09-26 RX ADMIN — SPIRONOLACTONE 25 MILLIGRAM(S): 25 TABLET, FILM COATED ORAL at 06:55

## 2020-09-26 RX ADMIN — MORPHINE SULFATE 15 MILLIGRAM(S): 50 CAPSULE, EXTENDED RELEASE ORAL at 10:38

## 2020-09-26 RX ADMIN — ONDANSETRON 4 MILLIGRAM(S): 8 TABLET, FILM COATED ORAL at 18:34

## 2020-09-26 RX ADMIN — SODIUM CHLORIDE 50 MILLILITER(S): 9 INJECTION, SOLUTION INTRAVENOUS at 18:09

## 2020-09-26 RX ADMIN — PRASUGREL 10 MILLIGRAM(S): 5 TABLET, FILM COATED ORAL at 13:52

## 2020-09-26 RX ADMIN — ONDANSETRON 4 MILLIGRAM(S): 8 TABLET, FILM COATED ORAL at 03:20

## 2020-09-26 RX ADMIN — Medication 0.12 MILLIGRAM(S): at 06:53

## 2020-09-26 RX ADMIN — CHLORHEXIDINE GLUCONATE 1 APPLICATION(S): 213 SOLUTION TOPICAL at 06:53

## 2020-09-26 RX ADMIN — POLYETHYLENE GLYCOL 3350 17 GRAM(S): 17 POWDER, FOR SOLUTION ORAL at 13:52

## 2020-09-26 RX ADMIN — GABAPENTIN 300 MILLIGRAM(S): 400 CAPSULE ORAL at 13:52

## 2020-09-26 RX ADMIN — Medication 81 MILLIGRAM(S): at 18:35

## 2020-09-26 RX ADMIN — NYSTATIN CREAM 1 APPLICATION(S): 100000 CREAM TOPICAL at 18:35

## 2020-09-26 RX ADMIN — MUPIROCIN 1 APPLICATION(S): 20 OINTMENT TOPICAL at 18:35

## 2020-09-26 RX ADMIN — SERTRALINE 25 MILLIGRAM(S): 25 TABLET, FILM COATED ORAL at 13:51

## 2020-09-26 RX ADMIN — MORPHINE SULFATE 15 MILLIGRAM(S): 50 CAPSULE, EXTENDED RELEASE ORAL at 11:20

## 2020-09-26 RX ADMIN — MORPHINE SULFATE 30 MILLIGRAM(S): 50 CAPSULE, EXTENDED RELEASE ORAL at 01:00

## 2020-09-26 RX ADMIN — GABAPENTIN 300 MILLIGRAM(S): 400 CAPSULE ORAL at 21:51

## 2020-09-26 RX ADMIN — SIMETHICONE 80 MILLIGRAM(S): 80 TABLET, CHEWABLE ORAL at 21:51

## 2020-09-26 RX ADMIN — ROSUVASTATIN CALCIUM 10 MILLIGRAM(S): 5 TABLET ORAL at 21:51

## 2020-09-26 RX ADMIN — MORPHINE SULFATE 4 MILLIGRAM(S): 50 CAPSULE, EXTENDED RELEASE ORAL at 13:53

## 2020-09-26 RX ADMIN — Medication 8 UNIT(S): at 09:18

## 2020-09-26 RX ADMIN — TAMSULOSIN HYDROCHLORIDE 0.4 MILLIGRAM(S): 0.4 CAPSULE ORAL at 21:51

## 2020-09-26 RX ADMIN — MORPHINE SULFATE 30 MILLIGRAM(S): 50 CAPSULE, EXTENDED RELEASE ORAL at 00:47

## 2020-09-26 RX ADMIN — MORPHINE SULFATE 4 MILLIGRAM(S): 50 CAPSULE, EXTENDED RELEASE ORAL at 14:15

## 2020-09-26 RX ADMIN — Medication 1 APPLICATION(S): at 15:25

## 2020-09-26 RX ADMIN — Medication 81 MILLIGRAM(S): at 06:53

## 2020-09-26 RX ADMIN — Medication 25 MILLIGRAM(S): at 15:25

## 2020-09-26 RX ADMIN — DAPTOMYCIN 126 MILLIGRAM(S): 500 INJECTION, POWDER, LYOPHILIZED, FOR SOLUTION INTRAVENOUS at 01:41

## 2020-09-26 RX ADMIN — GABAPENTIN 300 MILLIGRAM(S): 400 CAPSULE ORAL at 06:52

## 2020-09-26 RX ADMIN — Medication 8 UNIT(S): at 13:50

## 2020-09-26 NOTE — PROGRESS NOTE ADULT - SUBJECTIVE AND OBJECTIVE BOX
Ortho Note    Pt reports pain yesterday. States that pain meds work sometimes, appears comfortable in bed this AM  Denies CP, SOB, N/V, numbness/tingling     Vital Signs Last 24 Hrs  T(C): 36.7 (09-26-20 @ 09:08), Max: 36.9 (09-26-20 @ 05:39)  T(F): 98 (09-26-20 @ 09:08), Max: 98.4 (09-26-20 @ 05:39)  HR: 78 (09-26-20 @ 09:08) (78 - 79)  BP: 97/55 (09-26-20 @ 09:08) (94/53 - 97/55)  BP(mean): --  RR: 18 (09-26-20 @ 09:08) (17 - 18)  SpO2: 100% (09-26-20 @ 09:08) (97% - 100%)    Gen: Awake andlaert, NAD  RLE:  DSG intact, adaptive in place; drain in place holding good suction; Jessie in place, proximal donor site uncovered, healing; kerlix to R foot  Pulses: +DP;  feet cool to touch; at baseline; no edema  Sensation intact to baseline  Motor: 5/5 EHL/FHL; limiting dorsi and plantar flexion on R as per plastics    LLE:  DSG intact, wound vac to suction  Sensation intact at baseline  Motor: 5/5 EHL/FHL/TA/GS  Pulses: feet cool to touch; at baseline; no edema                            8.7    12.17 )-----------( 432      ( 26 Sep 2020 07:09 )             30.5   26 Sep 2020 07:09    137    |  103    |  16     ----------------------------<  138    4.4     |  25     |  0.91     Ca    8.2        26 Sep 2020 07:09    TPro  6.2    /  Alb  2.4    /  TBili  0.4    /  DBili  x      /  AST  23     /  ALT  21     /  AlkPhos  124    26 Sep 2020 07:09    Drain output:    09-25-20 @ 07:01  -  09-26-20 @ 07:00  --------------------------------------------------------  OUT:    Bulb (mL): 68 mL  Total OUT: 68 mL        A/P: 65yMale admitted for R knee pain, drainage s/p right knee arthroscopic I+D 7/17, right knee explant and abx spacer and debridement of LLE wound with wound vac placement on 7/30; readmitted, and now s/p R hip exchange of hardware on 09/04/2020; I+D of RUQ on 09/04/2020; R Knee Revision Antibiotic Spacer, R Knee medial gastroc flap and LLE STSG by Dr. Mckeon on 9/18  - Stable   - Pain control, appreciate PM input   - continue to trend inflammatory markers   - AM labs noted    - DVT ppx: ASA bid+ effient   - PT, TTWB RLE in jessie brace  - appreciate ID recs- Continue dapto and rifampin  - appreciate internal medicine, and cardiology recs   - appreciate plastics care   - dispo: PINKY    Ortho Pager [phone]

## 2020-09-26 NOTE — PROVIDER CONTACT NOTE (OTHER) - BACKGROUND
Recheck despite resistance, risk vs benefits discussed with Pt. Refused despite education, Pt verbalized understanding with teach back method.

## 2020-09-26 NOTE — PROGRESS NOTE ADULT - PROBLEM SELECTOR PLAN 2
s/p arthroscopic I and D, 7/17/2020.  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 right knee explant and antibiotic spacer.  Will follow with ID regarding the right hip cultures which is negative to date and does not look infected  repeat cultures from the last surgery negative to date.  PICC line.  s/p right knee spacer revision, right medial gastroc flap and left STSG.

## 2020-09-26 NOTE — PROGRESS NOTE ADULT - SUBJECTIVE AND OBJECTIVE BOX
Interval Events: Reviewed  Patient seen and examined at bedside.    Patient is a 65y old  Male who presents with a chief complaint of R Knee Swelling (25 Sep 2020 12:30)  no acute event overnight       PAST MEDICAL & SURGICAL HISTORY:  Diabetic neuropathy    STEMI (ST elevation myocardial infarction)    Diverticulitis    MRSA bacteremia    History of celiac disease    CHF (congestive heart failure)  EF ~ 25%    HTN (hypertension)    Diabetes  on insulin pump    Blood clot due to device, implant, or graft  was on blood thinners    HLD (hyperlipidemia)    Osteoarthritis    Atherosclerosis of coronary artery  CAD (coronary artery disease)    Status post percutaneous transluminal coronary angioplasty  in 2012    History of open reduction and internal fixation (ORIF) procedure    Surgery, elective  Right shoulder    Surgery, elective  right knee wound debridement    S/P TKR (total knee replacement), right  with infection Mrsa   per pt he was cleared from MRSA infection    S/P CABG x 1  2018    Stented coronary artery  10/18 heart attack  INFERIOR WALL MI    Other postprocedural status  Fixation hardware in foot LEFT        MEDICATIONS:  Pulmonary:    Antimicrobials:  DAPTOmycin IVPB 650 milliGRAM(s) IV Intermittent every 24 hours  rifAMPin 300 milliGRAM(s) Oral every 12 hours    Anticoagulants:  aspirin enteric coated 81 milliGRAM(s) Oral two times a day  prasugrel 10 milliGRAM(s) Oral daily    Cardiac:  digoxin     Tablet 0.125 milliGRAM(s) Oral daily  metoprolol succinate ER 25 milliGRAM(s) Oral daily  spironolactone 25 milliGRAM(s) Oral daily  tamsulosin 0.4 milliGRAM(s) Oral at bedtime      Allergies    ACE inhibitors (Hives)  carvedilol (Other)  enalapril (Hives)  Entresto (Other)    Intolerances        Vital Signs Last 24 Hrs  T(C): 36.9 (26 Sep 2020 05:39), Max: 36.9 (26 Sep 2020 05:39)  T(F): 98.4 (26 Sep 2020 05:39), Max: 98.4 (26 Sep 2020 05:39)  HR: 79 (26 Sep 2020 05:39) (72 - 86)  BP: 94/53 (26 Sep 2020 05:39) (84/54 - 108/58)  BP(mean): --  RR: 17 (26 Sep 2020 05:39) (16 - 18)  SpO2: 97% (26 Sep 2020 05:39) (93% - 97%)    09-25 @ 07:01 - 09-26 @ 07:00  --------------------------------------------------------  IN: 1370 mL / OUT: 218 mL / NET: 1152 mL          Review of Systems:   •	General: negative  •	Skin/Breast: negative  •	Ophthalmologic: negative  •	ENMT: negative  •	Respiratory and Thorax: negative  •	Cardiovascular: negative  •	Gastrointestinal: negative  •	Genitourinary: negative  •	Musculoskeletal: negative  •	Neurological: negative  •	Psychiatric: negative  •	Hematology/Lymphatics: negative  •	Endocrine: negative  •	Allergic/Immunologic: negative    Physical Exam:   • Constitutional:	Well-developed, well nourished  • Eyes:	EOMI; PERRL; no drainage or redness  • ENMT:	No oral lesions; no gross abnormalities  • Neck	no thyromegaly or nodules  • Breasts:	not examined  • Back:	No deformity or limitation of movement  • Respiratory:	Breath Sounds equal & clear to  auscultation, no accessory muscle use  • Cardiovascular:	Regular rate & rhythm, normal S1, S2; no murmurs, gallops or rubs; no S3, S4  • Gastrointestinal:	Soft, non-tender, no hepatosplenomegaly, normal bowel sounds  • Genitourinary:	not examined  • Rectal: not examined  • Extremities:	No cyanosis, clubbing or edema, right leg in brace, left leg dressing intact   • Vascular:	Equal and normal pulses (dorsalis pedis)  • Neurologica:l	not examined  • Skin:	No lesions; no rash  • Lymph Nodes:	No lymphadedenopathy  • Musculoskeletal:	No joint pain, swelling or deformity; no limitation of movement        LABS:      CBC Full  -  ( 26 Sep 2020 07:09 )  WBC Count : 12.17 K/uL  RBC Count : 3.96 M/uL  Hemoglobin : 8.7 g/dL  Hematocrit : 30.5 %  Platelet Count - Automated : 432 K/uL  Mean Cell Volume : 77.0 fl  Mean Cell Hemoglobin : 22.0 pg  Mean Cell Hemoglobin Concentration : 28.5 gm/dL  Auto Neutrophil # : x  Auto Lymphocyte # : x  Auto Monocyte # : x  Auto Eosinophil # : x  Auto Basophil # : x  Auto Neutrophil % : x  Auto Lymphocyte % : x  Auto Monocyte % : x  Auto Eosinophil % : x  Auto Basophil % : x    09-25    134<L>  |  102  |  18  ----------------------------<  123<H>  4.0   |  24  |  0.88    Ca    8.5      25 Sep 2020 07:25    TPro  5.7<L>  /  Alb  2.2<L>  /  TBili  0.4  /  DBili  x   /  AST  24  /  ALT  22  /  AlkPhos  109  09-25                    Culture Results:   Testing in progress (09-25 @ 00:57)      RADIOLOGY & ADDITIONAL STUDIES (The following images were personally reviewed):  Loja:                                     No  Urine output:                       adequate  DVT prophylaxis:                 Yes  Flattus:                                  Yes  Bowel movement:              No

## 2020-09-26 NOTE — PROGRESS NOTE ADULT - SUBJECTIVE AND OBJECTIVE BOX
Pt came in for flu vaccine pt tolerated vaccine well  Updated immunization record given     Patient is a 65y old  Male who presents with a chief complaint of R Knee Swelling (26 Sep 2020 11:14)      INTERVAL HPI/ OVERNIGHT EVENTS: Pt seen and examined bedside.      LABS                        8.7    12.17 )-----------( 432      ( 26 Sep 2020 07:09 )             30.5     09-26    137  |  103  |  16  ----------------------------<  138<H>  4.4   |  25  |  0.91    Ca    8.2<L>      26 Sep 2020 07:09    TPro  6.2  /  Alb  2.4<L>  /  TBili  0.4  /  DBili  x   /  AST  23  /  ALT  21  /  AlkPhos  124<H>  09-26      ESR: 48  CRP: --  09-26 @ 07:09    ICU Vital Signs Last 24 Hrs  T(C): 36.6 (26 Sep 2020 13:50), Max: 36.9 (26 Sep 2020 05:39)  T(F): 97.9 (26 Sep 2020 13:50), Max: 98.4 (26 Sep 2020 05:39)  HR: 80 (26 Sep 2020 13:50) (72 - 85)  BP: 110/67 (26 Sep 2020 13:50) (86/53 - 110/67)  BP(mean): --  ABP: --  ABP(mean): --  RR: 17 (26 Sep 2020 13:50) (17 - 18)  SpO2: 97% (26 Sep 2020 13:50) (93% - 100%)      RADIOLOGY    MICROBIOLOGY    PHYSICAL EXAM  Lower Extremity Focused  Vasc: DP/PT 1-4 b/l, +2 nisa malleolar and dorsum of the foot pitting edema b/l, CFT <3sec b/l  Derm: Right foot - 2nd digit ulcer at tip of toe, no drainage, no malodor, hallux maceration improving; small circular lesions present throughout his leg d/t to injuries related to his cast, Tinea Pedia b/l, onychomycosis. L foot: ulcer on the distal plantar aspect of the 2nd digit  Neuro: Protective sensations not intact from the distal tibia down (stocking distribution)  MSK: Partial amp of digits 2 and 3 on L foot, hammertoes on digits 2-5 on R foot, Hallux rigidus b/l

## 2020-09-26 NOTE — PROGRESS NOTE ADULT - SUBJECTIVE AND OBJECTIVE BOX
PLASTIC SURGERY PROGRESS NOTE    ID  64 yo male POD 8 gastroc flap and STSG closure of right knee defect.     S  The patient is doing well and her pain is under control. He complains of some pain from the STSG donor sites but it is tolerable.     O  Dressings clean dry and intact.     IP  The patient is doing very well and there were no acute changes overnight. I went over in detail with him the phases of health of a STSG donor site and he seemed relieved that the pain and puritits is normal.     Dictated by Hector Dye (Plastic Surgery Fellow) for Dr. Mckeon (Plastic Surgeon)

## 2020-09-26 NOTE — PROGRESS NOTE ADULT - ASSESSMENT
Patient is a 65y old  Male who presents with a chief complaint of R Knee Swelling scheduled for a R knee abx spacer exchange and gastroc flap procedure s/p pyogenic arthritis this Friday. He has requested a podiatry consult for diabetic foot ulcers on the right foot. Right foot found to have Rose grade 1 ulcers on 2nd and 3rd digits.    Plan:  Wounds stable. Low suspicion for residual infection  Wound care instructions: Apply bactroban + DSD to bilateral toe wounds. Change daily.  Plan discussed with Dr. Davalos  Please page podiatry with any questions.

## 2020-09-27 LAB
ALBUMIN SERPL ELPH-MCNC: 2.2 G/DL — LOW (ref 3.3–5)
ALP SERPL-CCNC: 118 U/L — SIGNIFICANT CHANGE UP (ref 40–120)
ALT FLD-CCNC: 20 U/L — SIGNIFICANT CHANGE UP (ref 10–45)
ANION GAP SERPL CALC-SCNC: 8 MMOL/L — SIGNIFICANT CHANGE UP (ref 5–17)
AST SERPL-CCNC: 19 U/L — SIGNIFICANT CHANGE UP (ref 10–40)
BILIRUB SERPL-MCNC: 0.4 MG/DL — SIGNIFICANT CHANGE UP (ref 0.2–1.2)
BUN SERPL-MCNC: 15 MG/DL — SIGNIFICANT CHANGE UP (ref 7–23)
CALCIUM SERPL-MCNC: 8.1 MG/DL — LOW (ref 8.4–10.5)
CHLORIDE SERPL-SCNC: 103 MMOL/L — SIGNIFICANT CHANGE UP (ref 96–108)
CO2 SERPL-SCNC: 23 MMOL/L — SIGNIFICANT CHANGE UP (ref 22–31)
CREAT SERPL-MCNC: 0.89 MG/DL — SIGNIFICANT CHANGE UP (ref 0.5–1.3)
GLUCOSE BLDC GLUCOMTR-MCNC: 135 MG/DL — HIGH (ref 70–99)
GLUCOSE BLDC GLUCOMTR-MCNC: 153 MG/DL — HIGH (ref 70–99)
GLUCOSE BLDC GLUCOMTR-MCNC: 167 MG/DL — HIGH (ref 70–99)
GLUCOSE BLDC GLUCOMTR-MCNC: 201 MG/DL — HIGH (ref 70–99)
GLUCOSE BLDC GLUCOMTR-MCNC: 237 MG/DL — HIGH (ref 70–99)
GLUCOSE SERPL-MCNC: 100 MG/DL — HIGH (ref 70–99)
HCT VFR BLD CALC: 29.3 % — LOW (ref 39–50)
HGB BLD-MCNC: 8.5 G/DL — LOW (ref 13–17)
MCHC RBC-ENTMCNC: 21.7 PG — LOW (ref 27–34)
MCHC RBC-ENTMCNC: 29 GM/DL — LOW (ref 32–36)
MCV RBC AUTO: 74.7 FL — LOW (ref 80–100)
NRBC # BLD: 0 /100 WBCS — SIGNIFICANT CHANGE UP (ref 0–0)
PLATELET # BLD AUTO: 434 K/UL — HIGH (ref 150–400)
POTASSIUM SERPL-MCNC: 4.3 MMOL/L — SIGNIFICANT CHANGE UP (ref 3.5–5.3)
POTASSIUM SERPL-SCNC: 4.3 MMOL/L — SIGNIFICANT CHANGE UP (ref 3.5–5.3)
PROT SERPL-MCNC: 5.5 G/DL — LOW (ref 6–8.3)
RBC # BLD: 3.92 M/UL — LOW (ref 4.2–5.8)
RBC # FLD: 24 % — HIGH (ref 10.3–14.5)
SODIUM SERPL-SCNC: 134 MMOL/L — LOW (ref 135–145)
WBC # BLD: 9.8 K/UL — SIGNIFICANT CHANGE UP (ref 3.8–10.5)
WBC # FLD AUTO: 9.8 K/UL — SIGNIFICANT CHANGE UP (ref 3.8–10.5)

## 2020-09-27 PROCEDURE — 99232 SBSQ HOSP IP/OBS MODERATE 35: CPT

## 2020-09-27 RX ORDER — KETOROLAC TROMETHAMINE 30 MG/ML
15 SYRINGE (ML) INJECTION ONCE
Refills: 0 | Status: DISCONTINUED | OUTPATIENT
Start: 2020-09-27 | End: 2020-09-27

## 2020-09-27 RX ORDER — SODIUM CHLORIDE 9 MG/ML
1000 INJECTION, SOLUTION INTRAVENOUS
Refills: 0 | Status: DISCONTINUED | OUTPATIENT
Start: 2020-09-27 | End: 2020-09-29

## 2020-09-27 RX ADMIN — POLYETHYLENE GLYCOL 3350 17 GRAM(S): 17 POWDER, FOR SOLUTION ORAL at 14:34

## 2020-09-27 RX ADMIN — ROSUVASTATIN CALCIUM 10 MILLIGRAM(S): 5 TABLET ORAL at 22:04

## 2020-09-27 RX ADMIN — MORPHINE SULFATE 4 MILLIGRAM(S): 50 CAPSULE, EXTENDED RELEASE ORAL at 19:26

## 2020-09-27 RX ADMIN — GABAPENTIN 300 MILLIGRAM(S): 400 CAPSULE ORAL at 22:04

## 2020-09-27 RX ADMIN — Medication 0.12 MILLIGRAM(S): at 05:30

## 2020-09-27 RX ADMIN — Medication 8 UNIT(S): at 08:06

## 2020-09-27 RX ADMIN — CHLORHEXIDINE GLUCONATE 1 APPLICATION(S): 213 SOLUTION TOPICAL at 10:07

## 2020-09-27 RX ADMIN — Medication 4: at 18:04

## 2020-09-27 RX ADMIN — Medication 81 MILLIGRAM(S): at 18:04

## 2020-09-27 RX ADMIN — MORPHINE SULFATE 4 MILLIGRAM(S): 50 CAPSULE, EXTENDED RELEASE ORAL at 19:50

## 2020-09-27 RX ADMIN — MORPHINE SULFATE 30 MILLIGRAM(S): 50 CAPSULE, EXTENDED RELEASE ORAL at 16:09

## 2020-09-27 RX ADMIN — MORPHINE SULFATE 4 MILLIGRAM(S): 50 CAPSULE, EXTENDED RELEASE ORAL at 12:50

## 2020-09-27 RX ADMIN — MORPHINE SULFATE 15 MILLIGRAM(S): 50 CAPSULE, EXTENDED RELEASE ORAL at 23:00

## 2020-09-27 RX ADMIN — Medication 4: at 14:36

## 2020-09-27 RX ADMIN — PRASUGREL 10 MILLIGRAM(S): 5 TABLET, FILM COATED ORAL at 14:34

## 2020-09-27 RX ADMIN — Medication 1 APPLICATION(S): at 14:34

## 2020-09-27 RX ADMIN — GABAPENTIN 300 MILLIGRAM(S): 400 CAPSULE ORAL at 05:30

## 2020-09-27 RX ADMIN — Medication 15 MILLIGRAM(S): at 08:00

## 2020-09-27 RX ADMIN — Medication 15 MILLIGRAM(S): at 07:35

## 2020-09-27 RX ADMIN — MORPHINE SULFATE 15 MILLIGRAM(S): 50 CAPSULE, EXTENDED RELEASE ORAL at 06:00

## 2020-09-27 RX ADMIN — MORPHINE SULFATE 4 MILLIGRAM(S): 50 CAPSULE, EXTENDED RELEASE ORAL at 13:10

## 2020-09-27 RX ADMIN — Medication 2: at 22:04

## 2020-09-27 RX ADMIN — Medication 8 UNIT(S): at 18:04

## 2020-09-27 RX ADMIN — MORPHINE SULFATE 15 MILLIGRAM(S): 50 CAPSULE, EXTENDED RELEASE ORAL at 07:00

## 2020-09-27 RX ADMIN — Medication 81 MILLIGRAM(S): at 05:30

## 2020-09-27 RX ADMIN — SERTRALINE 25 MILLIGRAM(S): 25 TABLET, FILM COATED ORAL at 14:34

## 2020-09-27 RX ADMIN — NYSTATIN CREAM 1 APPLICATION(S): 100000 CREAM TOPICAL at 10:08

## 2020-09-27 RX ADMIN — MORPHINE SULFATE 15 MILLIGRAM(S): 50 CAPSULE, EXTENDED RELEASE ORAL at 10:08

## 2020-09-27 RX ADMIN — SPIRONOLACTONE 25 MILLIGRAM(S): 25 TABLET, FILM COATED ORAL at 10:08

## 2020-09-27 RX ADMIN — MORPHINE SULFATE 15 MILLIGRAM(S): 50 CAPSULE, EXTENDED RELEASE ORAL at 11:05

## 2020-09-27 RX ADMIN — SODIUM CHLORIDE 75 MILLILITER(S): 9 INJECTION, SOLUTION INTRAVENOUS at 14:35

## 2020-09-27 RX ADMIN — GABAPENTIN 300 MILLIGRAM(S): 400 CAPSULE ORAL at 14:34

## 2020-09-27 RX ADMIN — MORPHINE SULFATE 15 MILLIGRAM(S): 50 CAPSULE, EXTENDED RELEASE ORAL at 22:04

## 2020-09-27 RX ADMIN — MORPHINE SULFATE 30 MILLIGRAM(S): 50 CAPSULE, EXTENDED RELEASE ORAL at 17:09

## 2020-09-27 RX ADMIN — NYSTATIN CREAM 1 APPLICATION(S): 100000 CREAM TOPICAL at 18:05

## 2020-09-27 RX ADMIN — TAMSULOSIN HYDROCHLORIDE 0.4 MILLIGRAM(S): 0.4 CAPSULE ORAL at 22:04

## 2020-09-27 RX ADMIN — INSULIN GLARGINE 20 UNIT(S): 100 INJECTION, SOLUTION SUBCUTANEOUS at 22:04

## 2020-09-27 RX ADMIN — DAPTOMYCIN 126 MILLIGRAM(S): 500 INJECTION, POWDER, LYOPHILIZED, FOR SOLUTION INTRAVENOUS at 01:01

## 2020-09-27 RX ADMIN — Medication 25 MILLIGRAM(S): at 16:06

## 2020-09-27 NOTE — PROGRESS NOTE ADULT - SUBJECTIVE AND OBJECTIVE BOX
Interval Events: Reviewed  Patient seen and examined at bedside.    Patient is a 65y old  Male who presents with a chief complaint of R Knee Swelling (26 Sep 2020 15:23)  no acute event overnight, pain is controlled , awake eating muffins      PAST MEDICAL & SURGICAL HISTORY:  Diabetic neuropathy    STEMI (ST elevation myocardial infarction)    Diverticulitis    MRSA bacteremia    History of celiac disease    CHF (congestive heart failure)  EF ~ 25%    HTN (hypertension)    Diabetes  on insulin pump    Blood clot due to device, implant, or graft  was on blood thinners    HLD (hyperlipidemia)    Osteoarthritis    Atherosclerosis of coronary artery  CAD (coronary artery disease)    Status post percutaneous transluminal coronary angioplasty  in 2012    History of open reduction and internal fixation (ORIF) procedure    Surgery, elective  Right shoulder    Surgery, elective  right knee wound debridement    S/P TKR (total knee replacement), right  with infection Mrsa   per pt he was cleared from MRSA infection    S/P CABG x 1  2018    Stented coronary artery  10/18 heart attack  INFERIOR WALL MI    Other postprocedural status  Fixation hardware in foot LEFT        MEDICATIONS:  Pulmonary:    Antimicrobials:  DAPTOmycin IVPB 650 milliGRAM(s) IV Intermittent every 24 hours  rifAMPin 300 milliGRAM(s) Oral every 12 hours    Anticoagulants:  aspirin enteric coated 81 milliGRAM(s) Oral two times a day  prasugrel 10 milliGRAM(s) Oral daily    Cardiac:  digoxin     Tablet 0.125 milliGRAM(s) Oral daily  metoprolol succinate ER 25 milliGRAM(s) Oral daily  spironolactone 25 milliGRAM(s) Oral daily  tamsulosin 0.4 milliGRAM(s) Oral at bedtime      Allergies    ACE inhibitors (Hives)  carvedilol (Other)  enalapril (Hives)  Entresto (Other)    Intolerances        Vital Signs Last 24 Hrs  T(C): 36.7 (27 Sep 2020 05:25), Max: 37.2 (27 Sep 2020 01:06)  T(F): 98 (27 Sep 2020 05:25), Max: 98.9 (27 Sep 2020 01:06)  HR: 84 (27 Sep 2020 05:59) (74 - 85)  BP: 102/56 (27 Sep 2020 05:59) (92/51 - 110/67)  BP(mean): --  RR: 17 (27 Sep 2020 05:25) (16 - 18)  SpO2: 97% (27 Sep 2020 05:25) (93% - 100%)    09-26 @ 07:01  -  09-27 @ 07:00  --------------------------------------------------------  IN: 1110 mL / OUT: 730 mL / NET: 380 mL          Review of Systems:   •	General: negative  •	Skin/Breast: negative  •	Ophthalmologic: negative  •	ENMT: negative  •	Respiratory and Thorax: negative  •	Cardiovascular: negative  •	Gastrointestinal: negative  •	Genitourinary: negative  •	Musculoskeletal: negative  •	Neurological: negative  •	Psychiatric: negative  •	Hematology/Lymphatics: negative  •	Endocrine: negative  •	Allergic/Immunologic: negative    Physical Exam:   • Constitutional:	Well-developed, well nourished  • Eyes:	EOMI; PERRL; no drainage or redness  • ENMT:	No oral lesions; no gross abnormalities  • Neck	no thyromegaly or nodules  • Breasts:	not examined  • Back:	No deformity or limitation of movement  • Respiratory:	Breath Sounds equal & clear to auscultation, no accessory muscle use  • Cardiovascular:	Regular rate & rhythm, normal S1, S2; no murmurs, gallops or rubs; no S3, S4  • Gastrointestinal:	Soft, non-tender, no hepatosplenomegaly, normal bowel sounds  • Genitourinary:	not examined  • Rectal: not examined  • Extremities:	No cyanosis, clubbing or edema, right leg in knee immobilizer, left leg dressing intact.   • Vascular:	Equal and normal pulses (dorsalis pedis)  • Neurologica:l	not examined  • Skin:	No lesions; no rash  • Lymph Nodes:	No lymphadedenopathy  • Musculoskeletal:	No joint pain, swelling or deformity; no limitation of movement        LABS:      CBC Full  -  ( 27 Sep 2020 06:16 )  WBC Count : 9.80 K/uL  RBC Count : 3.92 M/uL  Hemoglobin : 8.5 g/dL  Hematocrit : 29.3 %  Platelet Count - Automated : 434 K/uL  Mean Cell Volume : 74.7 fl  Mean Cell Hemoglobin : 21.7 pg  Mean Cell Hemoglobin Concentration : 29.0 gm/dL  Auto Neutrophil # : x  Auto Lymphocyte # : x  Auto Monocyte # : x  Auto Eosinophil # : x  Auto Basophil # : x  Auto Neutrophil % : x  Auto Lymphocyte % : x  Auto Monocyte % : x  Auto Eosinophil % : x  Auto Basophil % : x    09-27    134<L>  |  103  |  15  ----------------------------<  100<H>  4.3   |  23  |  0.89    Ca    8.1<L>      27 Sep 2020 06:16    TPro  5.5<L>  /  Alb  2.2<L>  /  TBili  0.4  /  DBili  x   /  AST  19  /  ALT  20  /  AlkPhos  118  09-27                        RADIOLOGY & ADDITIONAL STUDIES (The following images were personally reviewed):  Loja:                                     No  Urine output:                       adequate  DVT prophylaxis:                 Yes  Flattus:                                  Yes  Bowel movement:              No

## 2020-09-27 NOTE — PROGRESS NOTE ADULT - ASSESSMENT
A/P: Pt doing well POD#10 s/p right gastroc muscle flap closure of knee and skin grafts.   1. Maintain MIS drain until less than 20mL/24 hours  2. Change dressings every day - Adaptec, bacitracin and gauze over skin grafts; telfa/tegaderm over right leg incision.   3. TTWB on right side; Full WB on left side

## 2020-09-27 NOTE — PROVIDER CONTACT NOTE (OTHER) - SITUATION
BP 89/53 and HR 73. Pt refused tele monitoring. Blanchable redness to buttocks.
Hypoglycemic - FS 67mg/dL, gave 4oz juice. recheck after 15mins FS 58. At this time Pt refused glucagon gel. Another 4oz juice given. Recheck after 15mins FS 61. D5% currently running at 50mL/hr.
Low BP.
Pt vomited x 1, liquid noted with popcorn
Px reported right thumb numbness which he claimed started about two hours ago
Alert/Awake/Cooperative

## 2020-09-27 NOTE — PROGRESS NOTE ADULT - PROBLEM SELECTOR PLAN 2
s/p arthroscopic I and D, 7/17/2020.  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 right knee explant and antibiotic spacer.  Will follow with ID regarding the right hip cultures which is negative to date and does not look infected  repeat cultures from the last surgery negative to date.  PICC line.  s/p right knee spacer revision, right medial gastroc flap and left STSG in 9/18/2020

## 2020-09-27 NOTE — PROGRESS NOTE ADULT - SUBJECTIVE AND OBJECTIVE BOX
Ortho Note    Pt appears comfortable this AM, no new issues, pain somewhat controlled. Denies CP, SOB, N/V, numbness/tingling     Vital Signs Last 24 Hrs  T(C): 36.7 (09-27-20 @ 05:25), Max: 36.7 (09-27-20 @ 05:25)  T(F): 98 (09-27-20 @ 05:25), Max: 98 (09-27-20 @ 05:25)  HR: 84 (09-27-20 @ 05:59) (75 - 84)  BP: 102/56 (09-27-20 @ 05:59) (92/51 - 102/56)  BP(mean): --  RR: 17 (09-27-20 @ 05:25) (17 - 17)  SpO2: 97% (09-27-20 @ 05:25) (97% - 97%)    RLE:  Adaptec DSG over skin grafts and telfa and tegaderm over RLE incision intact; drain in place; [place] in place  Pulses: +DP;  feet cool to touch; at baseline; no edema  Sensation intact to baseline  Motor: EHL/FHL firing    LLE:  DSG intact  Sensation intact at baseline  Motor: 5/5 EHL/FHL/TA/GS  Pulses: 2+ DP                          8.5    9.80  )-----------( 434      ( 27 Sep 2020 06:16 )             29.3   27 Sep 2020 06:16    134    |  103    |  15     ----------------------------<  100    4.3     |  23     |  0.89     Ca    8.1        27 Sep 2020 06:16    TPro  5.5    /  Alb  2.2    /  TBili  0.4    /  DBili  x      /  AST  19     /  ALT  20     /  AlkPhos  118    27 Sep 2020 06:16    Drain output:    09-26-20 @ 07:01  -  09-27-20 @ 07:00  --------------------------------------------------------  OUT:    Bulb (mL): 65 mL  Total OUT: 65 mL        A/P: 65yMale admitted for R knee pain, drainage s/p right knee arthroscopic I+D 7/17, right knee explant and abx spacer and debridement of LLE wound with wound vac placement on 7/30; readmitted, and now s/p R hip exchange of hardware on 09/04/2020; I+D of RUQ on 09/04/2020; R Knee Revision Antibiotic Spacer, R Knee medial gastroc flap and LLE STSG by Dr. Mckeon on 9/18  - Stable   - Pain control, appreciate PM input   - continue to trend inflammatory markers   - DVT ppx: ASA bid+ effient   - PT, TTWB RLE in [place] brace; WBAT LLE  - appreciate ID recs- Continue dapto and rifampin  - appreciate med recs   - appreciate plastics input  - dispo: [facility]    Ortho Pager [phone]

## 2020-09-27 NOTE — PROGRESS NOTE ADULT - SUBJECTIVE AND OBJECTIVE BOX
PROGRESS NOTE    ID  66 yo male POD9 right grastroc and STSG for coverage of antibiotic spacer.     S  Patient is doing very well. He is having less pain from the donor sites.     O  We took down his dressings today and inspected the donor and recipient sites. All sites are healing well with no signs of infection. The skin graft is taking well.     IP  We replaced his dressing. We are happy with how his wounds are healing. We will continue to monitor them closely.     Dictated by Hector Dye for Dr. Mckeon (Plastic Surgeon)

## 2020-09-28 ENCOUNTER — APPOINTMENT (OUTPATIENT)
Dept: CARDIOLOGY | Facility: CLINIC | Age: 65
End: 2020-09-28

## 2020-09-28 LAB
ALBUMIN SERPL ELPH-MCNC: 2.1 G/DL — LOW (ref 3.3–5)
ALP SERPL-CCNC: 133 U/L — HIGH (ref 40–120)
ALT FLD-CCNC: 24 U/L — SIGNIFICANT CHANGE UP (ref 10–45)
ANION GAP SERPL CALC-SCNC: 11 MMOL/L — SIGNIFICANT CHANGE UP (ref 5–17)
AST SERPL-CCNC: 33 U/L — SIGNIFICANT CHANGE UP (ref 10–40)
BILIRUB SERPL-MCNC: 0.5 MG/DL — SIGNIFICANT CHANGE UP (ref 0.2–1.2)
BUN SERPL-MCNC: 16 MG/DL — SIGNIFICANT CHANGE UP (ref 7–23)
CALCIUM SERPL-MCNC: 8.4 MG/DL — SIGNIFICANT CHANGE UP (ref 8.4–10.5)
CHLORIDE SERPL-SCNC: 101 MMOL/L — SIGNIFICANT CHANGE UP (ref 96–108)
CK SERPL-CCNC: 182 U/L — SIGNIFICANT CHANGE UP (ref 30–200)
CO2 SERPL-SCNC: 22 MMOL/L — SIGNIFICANT CHANGE UP (ref 22–31)
CREAT SERPL-MCNC: 0.82 MG/DL — SIGNIFICANT CHANGE UP (ref 0.5–1.3)
CRP SERPL-MCNC: 3.94 MG/DL — HIGH (ref 0–0.4)
ERYTHROCYTE [SEDIMENTATION RATE] IN BLOOD: 43 MM/HR — HIGH
GLUCOSE BLDC GLUCOMTR-MCNC: 163 MG/DL — HIGH (ref 70–99)
GLUCOSE BLDC GLUCOMTR-MCNC: 180 MG/DL — HIGH (ref 70–99)
GLUCOSE BLDC GLUCOMTR-MCNC: 95 MG/DL — SIGNIFICANT CHANGE UP (ref 70–99)
GLUCOSE BLDC GLUCOMTR-MCNC: 98 MG/DL — SIGNIFICANT CHANGE UP (ref 70–99)
GLUCOSE SERPL-MCNC: 92 MG/DL — SIGNIFICANT CHANGE UP (ref 70–99)
HCT VFR BLD CALC: 32.9 % — LOW (ref 39–50)
HGB BLD-MCNC: 9.4 G/DL — LOW (ref 13–17)
MCHC RBC-ENTMCNC: 21.9 PG — LOW (ref 27–34)
MCHC RBC-ENTMCNC: 28.6 GM/DL — LOW (ref 32–36)
MCV RBC AUTO: 76.7 FL — LOW (ref 80–100)
NRBC # BLD: 0 /100 WBCS — SIGNIFICANT CHANGE UP (ref 0–0)
PLATELET # BLD AUTO: 481 K/UL — HIGH (ref 150–400)
POTASSIUM SERPL-MCNC: 4.5 MMOL/L — SIGNIFICANT CHANGE UP (ref 3.5–5.3)
POTASSIUM SERPL-SCNC: 4.5 MMOL/L — SIGNIFICANT CHANGE UP (ref 3.5–5.3)
PROT SERPL-MCNC: 6.1 G/DL — SIGNIFICANT CHANGE UP (ref 6–8.3)
RBC # BLD: 4.29 M/UL — SIGNIFICANT CHANGE UP (ref 4.2–5.8)
RBC # FLD: 23.8 % — HIGH (ref 10.3–14.5)
SODIUM SERPL-SCNC: 134 MMOL/L — LOW (ref 135–145)
WBC # BLD: 8.84 K/UL — SIGNIFICANT CHANGE UP (ref 3.8–10.5)
WBC # FLD AUTO: 8.84 K/UL — SIGNIFICANT CHANGE UP (ref 3.8–10.5)

## 2020-09-28 PROCEDURE — 99232 SBSQ HOSP IP/OBS MODERATE 35: CPT | Mod: GC

## 2020-09-28 PROCEDURE — 71045 X-RAY EXAM CHEST 1 VIEW: CPT | Mod: 26

## 2020-09-28 PROCEDURE — 99232 SBSQ HOSP IP/OBS MODERATE 35: CPT

## 2020-09-28 RX ORDER — MORPHINE SULFATE 50 MG/1
4 CAPSULE, EXTENDED RELEASE ORAL EVERY 4 HOURS
Refills: 0 | Status: DISCONTINUED | OUTPATIENT
Start: 2020-09-28 | End: 2020-10-03

## 2020-09-28 RX ORDER — VANCOMYCIN HCL 1 G
1250 VIAL (EA) INTRAVENOUS EVERY 24 HOURS
Refills: 0 | Status: DISCONTINUED | OUTPATIENT
Start: 2020-09-28 | End: 2020-10-03

## 2020-09-28 RX ADMIN — MORPHINE SULFATE 30 MILLIGRAM(S): 50 CAPSULE, EXTENDED RELEASE ORAL at 17:30

## 2020-09-28 RX ADMIN — Medication 8 UNIT(S): at 18:01

## 2020-09-28 RX ADMIN — Medication 166.67 MILLIGRAM(S): at 16:27

## 2020-09-28 RX ADMIN — INSULIN GLARGINE 20 UNIT(S): 100 INJECTION, SOLUTION SUBCUTANEOUS at 23:09

## 2020-09-28 RX ADMIN — MORPHINE SULFATE 15 MILLIGRAM(S): 50 CAPSULE, EXTENDED RELEASE ORAL at 05:30

## 2020-09-28 RX ADMIN — PRASUGREL 10 MILLIGRAM(S): 5 TABLET, FILM COATED ORAL at 12:24

## 2020-09-28 RX ADMIN — ROSUVASTATIN CALCIUM 10 MILLIGRAM(S): 5 TABLET ORAL at 23:09

## 2020-09-28 RX ADMIN — Medication 0.12 MILLIGRAM(S): at 05:47

## 2020-09-28 RX ADMIN — MORPHINE SULFATE 15 MILLIGRAM(S): 50 CAPSULE, EXTENDED RELEASE ORAL at 04:48

## 2020-09-28 RX ADMIN — Medication 1 APPLICATION(S): at 12:30

## 2020-09-28 RX ADMIN — MORPHINE SULFATE 15 MILLIGRAM(S): 50 CAPSULE, EXTENDED RELEASE ORAL at 10:45

## 2020-09-28 RX ADMIN — Medication 81 MILLIGRAM(S): at 17:52

## 2020-09-28 RX ADMIN — MORPHINE SULFATE 15 MILLIGRAM(S): 50 CAPSULE, EXTENDED RELEASE ORAL at 10:02

## 2020-09-28 RX ADMIN — GABAPENTIN 300 MILLIGRAM(S): 400 CAPSULE ORAL at 23:09

## 2020-09-28 RX ADMIN — Medication 25 MILLIGRAM(S): at 17:52

## 2020-09-28 RX ADMIN — CYCLOBENZAPRINE HYDROCHLORIDE 10 MILLIGRAM(S): 10 TABLET, FILM COATED ORAL at 00:57

## 2020-09-28 RX ADMIN — DAPTOMYCIN 126 MILLIGRAM(S): 500 INJECTION, POWDER, LYOPHILIZED, FOR SOLUTION INTRAVENOUS at 00:57

## 2020-09-28 RX ADMIN — Medication 2: at 18:06

## 2020-09-28 RX ADMIN — GABAPENTIN 300 MILLIGRAM(S): 400 CAPSULE ORAL at 14:33

## 2020-09-28 RX ADMIN — SERTRALINE 25 MILLIGRAM(S): 25 TABLET, FILM COATED ORAL at 12:24

## 2020-09-28 RX ADMIN — Medication 81 MILLIGRAM(S): at 05:47

## 2020-09-28 RX ADMIN — NYSTATIN CREAM 1 APPLICATION(S): 100000 CREAM TOPICAL at 05:47

## 2020-09-28 RX ADMIN — GABAPENTIN 300 MILLIGRAM(S): 400 CAPSULE ORAL at 05:47

## 2020-09-28 RX ADMIN — MORPHINE SULFATE 4 MILLIGRAM(S): 50 CAPSULE, EXTENDED RELEASE ORAL at 12:24

## 2020-09-28 RX ADMIN — MORPHINE SULFATE 30 MILLIGRAM(S): 50 CAPSULE, EXTENDED RELEASE ORAL at 16:32

## 2020-09-28 RX ADMIN — CHLORHEXIDINE GLUCONATE 1 APPLICATION(S): 213 SOLUTION TOPICAL at 05:46

## 2020-09-28 RX ADMIN — MORPHINE SULFATE 4 MILLIGRAM(S): 50 CAPSULE, EXTENDED RELEASE ORAL at 13:00

## 2020-09-28 RX ADMIN — NYSTATIN CREAM 1 APPLICATION(S): 100000 CREAM TOPICAL at 17:54

## 2020-09-28 RX ADMIN — SPIRONOLACTONE 25 MILLIGRAM(S): 25 TABLET, FILM COATED ORAL at 12:24

## 2020-09-28 NOTE — PROGRESS NOTE ADULT - ASSESSMENT
65M w/ PMHx of CAD s/p CABG, CHF, DM, HLD, HTN, acute cholecystitis s/p perc sudhir 2/28 (drain removed in late May 2020), recent R TKA 7/22 presented to [facility] with R knee infection s/p R knee hardware exchange on 9/4 and R knee spacer w/ flap from plastics on 9/18.    We did bedside I&D of the RUQ on 09/1/2020       General surgery re-consulted for continued purulent drainage coming from his prior MIS site.     Patient has no pain, his abdominal exam is benign, no discharge was seen in th last 2 days from RUQ  WBCs and LFts are normal, cultures from RUQ did not grow any bacteria    Discussed with Dr. Latif and chief resident    Plan:  - FU with Dr. Latif in the clinic for future lap sudhir  - Surgery now will sign off    Thanks for consulting our service

## 2020-09-28 NOTE — PROGRESS NOTE ADULT - SUBJECTIVE AND OBJECTIVE BOX
Infectious Diseases Progress Note:    SUBJECTIVE: Patient seen and examined at bedside.     KNEE PAIN      Neurocognitive disorder    Adjustment disorder with mixed anxiety and depressed mood    Postoperative state    Preoperative clearance    Leg wound, left    Atherosclerosis of coronary artery    HLD (hyperlipidemia)    HTN (hypertension)    CHF (congestive heart failure)    Diabetes    Acute pain of right knee        Allergies    ACE inhibitors (Hives)  carvedilol (Other)  enalapril (Hives)  Entresto (Other)    Intolerances        ANTIBIOTICS/RELEVANT:  antimicrobials  DAPTOmycin IVPB 650 milliGRAM(s) IV Intermittent every 24 hours  rifAMPin 300 milliGRAM(s) Oral every 12 hours    immunologic:      OTHER:  aspirin enteric coated 81 milliGRAM(s) Oral two times a day  BACItracin   Ointment 1 Application(s) Topical daily  chlorhexidine 2% Cloths 1 Application(s) Topical <User Schedule>  cyclobenzaprine 10 milliGRAM(s) Oral three times a day PRN  dextrose 40% Gel 15 Gram(s) Oral once PRN  dextrose 40% Gel 15 Gram(s) Oral once PRN  dextrose 5%. 1000 milliLiter(s) IV Continuous <Continuous>  dextrose 5%. 1000 milliLiter(s) IV Continuous <Continuous>  dextrose 50% Injectable 12.5 Gram(s) IV Push once  dextrose 50% Injectable 25 Gram(s) IV Push once  dextrose 50% Injectable 25 Gram(s) IV Push once  digoxin     Tablet 0.125 milliGRAM(s) Oral daily  diphenhydrAMINE 50 milliGRAM(s) Oral every 4 hours PRN  gabapentin 300 milliGRAM(s) Oral three times a day  glucagon  Injectable 1 milliGRAM(s) IntraMuscular once PRN  glucagon  Injectable 1 milliGRAM(s) IntraMuscular once PRN  insulin glargine Injectable (LANTUS) 20 Unit(s) SubCutaneous at bedtime  insulin lispro (HumaLOG) corrective regimen sliding scale   SubCutaneous Before meals and at bedtime  insulin lispro Injectable (HumaLOG) 8 Unit(s) SubCutaneous three times a day before meals  lactated ringers. 1000 milliLiter(s) IV Continuous <Continuous>  metoclopramide Injectable 5 milliGRAM(s) IV Push every 8 hours PRN  metoprolol succinate ER 25 milliGRAM(s) Oral daily  morphine  - Injectable 4 milliGRAM(s) IV Push every 4 hours PRN  morphine  IR 15 milliGRAM(s) Oral every 4 hours PRN  morphine  IR 30 milliGRAM(s) Oral every 4 hours PRN  naloxone Injectable 0.1 milliGRAM(s) IV Push every 3 minutes PRN  nystatin Cream 1 Application(s) Topical two times a day  ondansetron Injectable 4 milliGRAM(s) IV Push every 6 hours PRN  polyethylene glycol 3350 17 Gram(s) Oral daily  prasugrel 10 milliGRAM(s) Oral daily  rosuvastatin 10 milliGRAM(s) Oral at bedtime  senna 2 Tablet(s) Oral at bedtime PRN  sertraline 25 milliGRAM(s) Oral daily  simethicone 80 milliGRAM(s) Chew three times a day PRN  sodium chloride 0.9% lock flush 10 milliLiter(s) IV Push every 1 hour PRN  spironolactone 25 milliGRAM(s) Oral daily  tamsulosin 0.4 milliGRAM(s) Oral at bedtime      Objective:  Vital Signs Last 24 Hrs  T(C): 36.7 (28 Sep 2020 09:42), Max: 37.1 (27 Sep 2020 20:10)  T(F): 98.1 (28 Sep 2020 09:42), Max: 98.7 (27 Sep 2020 20:10)  HR: 89 (28 Sep 2020 12:16) (75 - 90)  BP: 112/63 (28 Sep 2020 12:16) (93/53 - 112/63)  BP(mean): --  RR: 16 (28 Sep 2020 12:16) (16 - 19)  SpO2: 94% (28 Sep 2020 12:16) (93% - 100%)    PHYSICAL EXAM:  General: WDWN, NAD, resting comfortably in bed  HEENT: NC/AT; anicteric sclera; MMM  Neck: supple  Cardiovascular: +S1/S2; RRR  Respiratory: CTA B/L; no W/R/R  Gastrointestinal: soft, NT/ND; +BSx4,   Extremities: WWP;, L foot wrapped, dressing c/d/i  Vascular: 2+ radial, DP/PT pulses B/L  Neurological: AAOx3    LABS:                        9.4    8.84  )-----------( 481      ( 28 Sep 2020 05:36 )             32.9     09-28    134<L>  |  101  |  16  ----------------------------<  92  4.5   |  22  |  0.82    Ca    8.4      28 Sep 2020 05:36    TPro  6.1  /  Alb  2.1<L>  /  TBili  0.5  /  DBili  x   /  AST  33  /  ALT  24  /  AlkPhos  133<H>  09-28          MICROBIOLOGY:            RADIOLOGY & ADDITIONAL STUDIES: Reviewed

## 2020-09-28 NOTE — PROGRESS NOTE ADULT - SUBJECTIVE AND OBJECTIVE BOX
Interval Events: Reviewed  Patient seen and examined at bedside.    Patient is a 65y old  Male who presents with a chief complaint of R Knee Swelling (28 Sep 2020 14:39)    he felt sob and may be anxious but he is OK now  PAST MEDICAL & SURGICAL HISTORY:  Diabetic neuropathy    STEMI (ST elevation myocardial infarction)    Diverticulitis    MRSA bacteremia    History of celiac disease    CHF (congestive heart failure)  EF ~ 25%    HTN (hypertension)    Diabetes  on insulin pump    Blood clot due to device, implant, or graft  was on blood thinners    HLD (hyperlipidemia)    Osteoarthritis    Atherosclerosis of coronary artery  CAD (coronary artery disease)    Status post percutaneous transluminal coronary angioplasty  in 2012    History of open reduction and internal fixation (ORIF) procedure    Surgery, elective  Right shoulder    Surgery, elective  right knee wound debridement    S/P TKR (total knee replacement), right  with infection Mrsa   per pt he was cleared from MRSA infection    S/P CABG x 1  2018    Stented coronary artery  10/18 heart attack  INFERIOR WALL MI    Other postprocedural status  Fixation hardware in foot LEFT        MEDICATIONS:  Pulmonary:    Antimicrobials:  rifAMPin 300 milliGRAM(s) Oral every 12 hours  vancomycin  IVPB 1250 milliGRAM(s) IV Intermittent every 24 hours    Anticoagulants:  aspirin enteric coated 81 milliGRAM(s) Oral two times a day  prasugrel 10 milliGRAM(s) Oral daily    Cardiac:  digoxin     Tablet 0.125 milliGRAM(s) Oral daily  metoprolol succinate ER 25 milliGRAM(s) Oral daily  spironolactone 25 milliGRAM(s) Oral daily  tamsulosin 0.4 milliGRAM(s) Oral at bedtime      Allergies    ACE inhibitors (Hives)  carvedilol (Other)  enalapril (Hives)  Entresto (Other)    Intolerances        Vital Signs Last 24 Hrs  T(C): 36.7 (28 Sep 2020 20:38), Max: 36.9 (28 Sep 2020 00:54)  T(F): 98.1 (28 Sep 2020 20:38), Max: 98.5 (28 Sep 2020 00:54)  HR: 76 (28 Sep 2020 20:38) (75 - 89)  BP: 110/56 (28 Sep 2020 20:38) (93/53 - 113/56)  BP(mean): --  RR: 18 (28 Sep 2020 20:38) (16 - 18)  SpO2: 97% (28 Sep 2020 20:38) (94% - 100%)    09-27 @ 07:01 - 09-28 @ 07:00  --------------------------------------------------------  IN: 1245 mL / OUT: 1060 mL / NET: 185 mL    09-28 @ 07:01 - 09-28 @ 22:28  --------------------------------------------------------  IN: 900 mL / OUT: 480 mL / NET: 420 mL          Review of Systems:   •	General: negative  •	Skin/Breast: negative  •	Ophthalmologic: negative  •	ENMT: negative  •	Respiratory and Thorax: negative  •	Cardiovascular: negative  •	Gastrointestinal: negative  •	Genitourinary: negative  •	Musculoskeletal: negative  •	Neurological: negative  •	Psychiatric: negative  •	Hematology/Lymphatics: negative  •	Endocrine: negative  •	Allergic/Immunologic: negative    Physical Exam:   • Constitutional:	Well-developed, well nourished  • Eyes:	EOMI; PERRL; no drainage or redness  • ENMT:	No oral lesions; no gross abnormalities  • Neck	No bruits; no thyromegaly or nodules  • Breasts:	not examined  • Back:	No deformity or limitation of movement  • Respiratory:	Breath Sounds equal & clear to percussion & auscultation, no accessory muscle use  • Cardiovascular:	Regular rate & rhythm, normal S1, S2; no murmurs, gallops or rubs; no S3, S4  • Gastrointestinal:	Soft, non-tender, no hepatosplenomegaly, normal bowel sounds  • Genitourinary:	not examined  • Rectal: not examined  • Extremities:	No cyanosis, clubbing or edema  • Vascular:	Equal and normal pulses (carotid, femoral, dorsalis pedis)  • Neurologica:l	not examined  • Skin:	No lesions; no rash  • Lymph Nodes:	No lymphadedenopathy  • Musculoskeletal:	No joint pain, swelling or deformity; no limitation of movement        LABS:      CBC Full  -  ( 28 Sep 2020 05:36 )  WBC Count : 8.84 K/uL  RBC Count : 4.29 M/uL  Hemoglobin : 9.4 g/dL  Hematocrit : 32.9 %  Platelet Count - Automated : 481 K/uL  Mean Cell Volume : 76.7 fl  Mean Cell Hemoglobin : 21.9 pg  Mean Cell Hemoglobin Concentration : 28.6 gm/dL  Auto Neutrophil # : x  Auto Lymphocyte # : x  Auto Monocyte # : x  Auto Eosinophil # : x  Auto Basophil # : x  Auto Neutrophil % : x  Auto Lymphocyte % : x  Auto Monocyte % : x  Auto Eosinophil % : x  Auto Basophil % : x    09-28    134<L>  |  101  |  16  ----------------------------<  92  4.5   |  22  |  0.82    Ca    8.4      28 Sep 2020 05:36    TPro  6.1  /  Alb  2.1<L>  /  TBili  0.5  /  DBili  x   /  AST  33  /  ALT  24  /  AlkPhos  133<H>  09-28                        RADIOLOGY & ADDITIONAL STUDIES (The following images were personally reviewed):  Loja:                                     No  Urine output:                       adequate  DVT prophylaxis:                 Yes  Flattus:                                  Yes  Bowel movement:              No

## 2020-09-28 NOTE — CHART NOTE - NSCHARTNOTEFT_GEN_A_CORE
Admitting Diagnosis:   Patient is a 65y old  Male who presents with a chief complaint of R Knee Swelling (28 Sep 2020 14:39)    PAST MEDICAL & SURGICAL HISTORY:  Diabetic neuropathy  STEMI (ST elevation myocardial infarction)  Diverticulitis  MRSA bacteremia  History of celiac disease  CHF (congestive heart failure)  EF ~ 25%  HTN (hypertension)  Diabetes  on insulin pump  Blood clot due to device, implant, or graft  was on blood thinners  HLD (hyperlipidemia)  Osteoarthritis  Atherosclerosis of coronary artery  CAD (coronary artery disease)  Status post percutaneous transluminal coronary angioplasty  in 2012  History of open reduction and internal fixation (ORIF) procedure  Surgery, elective  Right shoulder  Surgery, elective  right knee wound debridement  S/P TKR (total knee replacement), right  with infection Mrsa   per pt he was cleared from MRSA infection  S/P CABG x 1  2018  Stented coronary artery  10/18 heart attack  INFERIOR WALL MI  Other postprocedural status  Fixation hardware in foot LEFT    Current Nutrition Order: CST CHO diet with evening snack and Glucerna Shakes TID (660 kcal, 30g protein, 600mL H2O)     PO Intake: Good (%) [   ]  Fair (50-75%) [ x  ] Poor (<25%) [   ]    GI Issues:     Pain:    Skin Integrity:    Labs:   09-28    134<L>  |  101  |  16  ----------------------------<  92  4.5   |  22  |  0.82    Ca    8.4      28 Sep 2020 05:36    TPro  6.1  /  Alb  2.1<L>  /  TBili  0.5  /  DBili  x   /  AST  33  /  ALT  24  /  AlkPhos  133<H>  09-28    CAPILLARY BLOOD GLUCOSE    POCT Blood Glucose.: 163 mg/dL (28 Sep 2020 12:42)  POCT Blood Glucose.: 95 mg/dL (28 Sep 2020 07:46)  POCT Blood Glucose.: 153 mg/dL (27 Sep 2020 21:34)  POCT Blood Glucose.: 237 mg/dL (27 Sep 2020 17:11)    Medications:  MEDICATIONS  (STANDING):  aspirin enteric coated 81 milliGRAM(s) Oral two times a day  BACItracin   Ointment 1 Application(s) Topical daily  chlorhexidine 2% Cloths 1 Application(s) Topical <User Schedule>  dextrose 5%. 1000 milliLiter(s) (50 mL/Hr) IV Continuous <Continuous>  dextrose 5%. 1000 milliLiter(s) (50 mL/Hr) IV Continuous <Continuous>  dextrose 50% Injectable 12.5 Gram(s) IV Push once  dextrose 50% Injectable 25 Gram(s) IV Push once  dextrose 50% Injectable 25 Gram(s) IV Push once  digoxin     Tablet 0.125 milliGRAM(s) Oral daily  gabapentin 300 milliGRAM(s) Oral three times a day  insulin glargine Injectable (LANTUS) 20 Unit(s) SubCutaneous at bedtime  insulin lispro (HumaLOG) corrective regimen sliding scale   SubCutaneous Before meals and at bedtime  insulin lispro Injectable (HumaLOG) 8 Unit(s) SubCutaneous three times a day before meals  lactated ringers. 1000 milliLiter(s) (75 mL/Hr) IV Continuous <Continuous>  metoprolol succinate ER 25 milliGRAM(s) Oral daily  nystatin Cream 1 Application(s) Topical two times a day  polyethylene glycol 3350 17 Gram(s) Oral daily  prasugrel 10 milliGRAM(s) Oral daily  rifAMPin 300 milliGRAM(s) Oral every 12 hours  rosuvastatin 10 milliGRAM(s) Oral at bedtime  sertraline 25 milliGRAM(s) Oral daily  spironolactone 25 milliGRAM(s) Oral daily  tamsulosin 0.4 milliGRAM(s) Oral at bedtime  vancomycin  IVPB 1250 milliGRAM(s) IV Intermittent every 24 hours    MEDICATIONS  (PRN):  cyclobenzaprine 10 milliGRAM(s) Oral three times a day PRN Muscle Spasm  dextrose 40% Gel 15 Gram(s) Oral once PRN Blood Glucose LESS THAN 70 milliGRAM(s)/deciliter  dextrose 40% Gel 15 Gram(s) Oral once PRN Blood Glucose LESS THAN 70 milliGRAM(s)/deciliter  diphenhydrAMINE 50 milliGRAM(s) Oral every 4 hours PRN Rash and/or Itching  glucagon  Injectable 1 milliGRAM(s) IntraMuscular once PRN Glucose LESS THAN 70 milligrams/deciliter  glucagon  Injectable 1 milliGRAM(s) IntraMuscular once PRN Glucose LESS THAN 70 milligrams/deciliter  metoclopramide Injectable 5 milliGRAM(s) IV Push every 8 hours PRN Nausea  morphine  - Injectable 4 milliGRAM(s) IV Push every 4 hours PRN breakthrough pain  morphine  IR 15 milliGRAM(s) Oral every 4 hours PRN Moderate Pain (4 - 6)  morphine  IR 30 milliGRAM(s) Oral every 4 hours PRN Severe Pain (7 - 10)  naloxone Injectable 0.1 milliGRAM(s) IV Push every 3 minutes PRN For ANY of the following changes in patient status:  A. RR LESS THAN 10 breaths per minute, B. Oxygen saturation LESS THAN 90%, C. Sedation score of 6  ondansetron Injectable 4 milliGRAM(s) IV Push every 6 hours PRN Nausea  senna 2 Tablet(s) Oral at bedtime PRN Constipation  simethicone 80 milliGRAM(s) Chew three times a day PRN Upset Stomach  sodium chloride 0.9% lock flush 10 milliLiter(s) IV Push every 1 hour PRN Pre/post blood products, medications, blood draw, and to maintain line patency    Weight:  Daily     Daily     Weight Change:     Nutrition Focused Physical Exam: Completed [   ]  Not Pertinent [   ]  Muscle Wasting- Temporal [   ]  Clavicle/Pectoral [   ]  Shoulder/Deltoid [   ]  Scapula [   ]  Interosseous [   ]  Quadriceps [   ]  Gastrocnemius [   ]  Fat Wasting- Orbital [   ]  Buccal [   ]  Triceps [   ]  Rib [   ]  Suspect [PCM] 2/2 to physical assessment, [poor intake], and [wt loss]; please see malnutrition chart note.    Estimated energy needs:     Subjective:     Previous Nutrition Diagnosis:    Active [   ]  Resolved [   ]    If resolved, new PES:     Goal:    Recommendations:    Education:     Risk Level: High [   ] Moderate [   ] Low [   ] Admitting Diagnosis:   Patient is a 65y old  Male who presents with a chief complaint of R Knee Swelling (28 Sep 2020 14:39)    PAST MEDICAL & SURGICAL HISTORY:  Diabetic neuropathy  STEMI (ST elevation myocardial infarction)  Diverticulitis  MRSA bacteremia  History of celiac disease  CHF (congestive heart failure)  EF ~ 25%  HTN (hypertension)  Diabetes  on insulin pump  Blood clot due to device, implant, or graft  was on blood thinners  HLD (hyperlipidemia)  Osteoarthritis  Atherosclerosis of coronary artery  CAD (coronary artery disease)  Status post percutaneous transluminal coronary angioplasty  in 2012  History of open reduction and internal fixation (ORIF) procedure  Surgery, elective  Right shoulder  Surgery, elective  right knee wound debridement  S/P TKR (total knee replacement), right  with infection Mrsa   per pt he was cleared from MRSA infection  S/P CABG x 1  2018  Stented coronary artery  10/18 heart attack  INFERIOR WALL MI  Other postprocedural status  Fixation hardware in foot LEFT    Current Nutrition Order: CST CHO diet with evening snack and Glucerna Shakes TID (660 kcal, 30g protein, 600mL H2O)     PO Intake: Good (%) [   ]  Fair (50-75%) [ x  ] Poor (<25%) [   ]    GI Issues:   WDL, last BM 9/27  No n/v/d/c noted  No abd distention noted    Pain:  9/10 right leg pain noted- well controlled with pain regime    Skin Integrity:  Surgical incision, arelis score 18  RUQ drainage of wound of left foot and knee/surgical incision.  +Rose Ulcer stg I of 2nd and 3rd digits  No edema present    Labs:   09-28    134<L>  |  101  |  16  ----------------------------<  92  4.5   |  22  |  0.82    Ca    8.4      28 Sep 2020 05:36    TPro  6.1  /  Alb  2.1<L>  /  TBili  0.5  /  DBili  x   /  AST  33  /  ALT  24  /  AlkPhos  133<H>  09-28    CAPILLARY BLOOD GLUCOSE    POCT Blood Glucose.: 163 mg/dL (28 Sep 2020 12:42)  POCT Blood Glucose.: 95 mg/dL (28 Sep 2020 07:46)  POCT Blood Glucose.: 153 mg/dL (27 Sep 2020 21:34)  POCT Blood Glucose.: 237 mg/dL (27 Sep 2020 17:11)    Medications:  MEDICATIONS  (STANDING):  aspirin enteric coated 81 milliGRAM(s) Oral two times a day  BACItracin   Ointment 1 Application(s) Topical daily  chlorhexidine 2% Cloths 1 Application(s) Topical <User Schedule>  dextrose 5%. 1000 milliLiter(s) (50 mL/Hr) IV Continuous <Continuous>  dextrose 5%. 1000 milliLiter(s) (50 mL/Hr) IV Continuous <Continuous>  dextrose 50% Injectable 12.5 Gram(s) IV Push once  dextrose 50% Injectable 25 Gram(s) IV Push once  dextrose 50% Injectable 25 Gram(s) IV Push once  digoxin     Tablet 0.125 milliGRAM(s) Oral daily  gabapentin 300 milliGRAM(s) Oral three times a day  insulin glargine Injectable (LANTUS) 20 Unit(s) SubCutaneous at bedtime  insulin lispro (HumaLOG) corrective regimen sliding scale   SubCutaneous Before meals and at bedtime  insulin lispro Injectable (HumaLOG) 8 Unit(s) SubCutaneous three times a day before meals  lactated ringers. 1000 milliLiter(s) (75 mL/Hr) IV Continuous <Continuous>  metoprolol succinate ER 25 milliGRAM(s) Oral daily  nystatin Cream 1 Application(s) Topical two times a day  polyethylene glycol 3350 17 Gram(s) Oral daily  prasugrel 10 milliGRAM(s) Oral daily  rifAMPin 300 milliGRAM(s) Oral every 12 hours  rosuvastatin 10 milliGRAM(s) Oral at bedtime  sertraline 25 milliGRAM(s) Oral daily  spironolactone 25 milliGRAM(s) Oral daily  tamsulosin 0.4 milliGRAM(s) Oral at bedtime  vancomycin  IVPB 1250 milliGRAM(s) IV Intermittent every 24 hours    MEDICATIONS  (PRN):  cyclobenzaprine 10 milliGRAM(s) Oral three times a day PRN Muscle Spasm  dextrose 40% Gel 15 Gram(s) Oral once PRN Blood Glucose LESS THAN 70 milliGRAM(s)/deciliter  dextrose 40% Gel 15 Gram(s) Oral once PRN Blood Glucose LESS THAN 70 milliGRAM(s)/deciliter  diphenhydrAMINE 50 milliGRAM(s) Oral every 4 hours PRN Rash and/or Itching  glucagon  Injectable 1 milliGRAM(s) IntraMuscular once PRN Glucose LESS THAN 70 milligrams/deciliter  glucagon  Injectable 1 milliGRAM(s) IntraMuscular once PRN Glucose LESS THAN 70 milligrams/deciliter  metoclopramide Injectable 5 milliGRAM(s) IV Push every 8 hours PRN Nausea  morphine  - Injectable 4 milliGRAM(s) IV Push every 4 hours PRN breakthrough pain  morphine  IR 15 milliGRAM(s) Oral every 4 hours PRN Moderate Pain (4 - 6)  morphine  IR 30 milliGRAM(s) Oral every 4 hours PRN Severe Pain (7 - 10)  naloxone Injectable 0.1 milliGRAM(s) IV Push every 3 minutes PRN For ANY of the following changes in patient status:  A. RR LESS THAN 10 breaths per minute, B. Oxygen saturation LESS THAN 90%, C. Sedation score of 6  ondansetron Injectable 4 milliGRAM(s) IV Push every 6 hours PRN Nausea  senna 2 Tablet(s) Oral at bedtime PRN Constipation  simethicone 80 milliGRAM(s) Chew three times a day PRN Upset Stomach  sodium chloride 0.9% lock flush 10 milliLiter(s) IV Push every 1 hour PRN Pre/post blood products, medications, blood draw, and to maintain line patency    Admitted Anthropometrics:  Height: 74" Weight: 185lbs, IBW 190lbs+/-10%, %IBW 97%, BMI 23.7 kg/m2    Weight:  July 2020 183lbs (previous admission)  9/1 185lbs  9/15 210lbs  9/18 210lbs    Weight Change: Pt with sudden weight gain over the last 2 weeks (+25lbs), please reweigh to confirm weight changes and then trend biweekly.     Nutrition Focused Physical Exam: Completed [   ]  Unable to complete [ x  ]    Estimated energy needs:   ABW (84kg) used for calculations as pt between % of IBW. Needs adjusted for wound healing.   Kcal (25-30 kcal/kg): 8522-7121 kcal  Protein (1.2-1.4 g/kg pro): 100-118 g pro  Fluids (25-30 ml/kg): 2491-5270 ml    Subjective:   66 yo M with HTN, hyperlipidemia, type II DM (A1C 8.9% 7/20) with peripheral neuropathy, CAD s/p MIDCAB (2018)/VERONICA, HFrEF, AICD (2/20), pulmonary HTN, chronic cholecystitis with recurrent R knee PPJI - MRSA, likely persistent from 11/2019.  He has completed 6 weeks of vancomycin & rifampin.  Inflammatory markers remained very elevated as outpatient, inpatient values downtrending and improved from discharge last month.  His knee was aspirated by Dr. Castro on 9/2/2020. Pt with other potential sources for infection/inflammation and is undergoing evaluation by Surgery regarding reported RUQ persistent drainage and right knee infection- started on abx course (now on maxipime, daptomycin, and rifampin). Pt now s/p insertion of abx spacer 9/18. Pt s/p bedrest over the last 7 days with plan for d/c likely to rehab per CM notes. On assessment, pt is resting in bed without complaints. Currently on CST CHO diet with evening snack and Glucerna Shakes TID (660 kcal, 30g protein, 600mL H2O). Pt consuming >50% of meals. Pt declines diet education at this time, cont to encourage adequate PO intake instaed- pt receptive and attempting to try and increase overall PO intake. Cont with tight BS control (on lantus and humalog). Please see nutrition recs below. RD to follow.     Nutrition Diagnosis: Increased pro needs RT increased nutrient demand 2/2 wound healing AEB multiple wounds noted    [  ] No active nutrition diagnosis at this time  [  ] Current medical condition precludes nutrition intervention    Goal: Consistently meet >75% est needs    Recommendations:  1. CST CHO diet with Glucerna Shakes TID (660 kcal, 30g protein, 600mL H2O)   2. Pain and bowel regime per team  3. Tight BS control  4. RD diet education reenforcement prn  5. Recommend reweigh and then trend weights biweekly.     Education: Pt declines diet education at this time, cont to encourage adequate PO intake instaed- pt receptive and attempting to try and increase overall PO intake.    Risk Level: High [  ] Moderate [ x  ] Low [   ].

## 2020-09-28 NOTE — PROGRESS NOTE ADULT - SUBJECTIVE AND OBJECTIVE BOX
S/p right knee antibiotic spacer placement  SUBJECTIVE: Patient seen and examined.  Pt notes his pain was not well controlled over last few days, but improved with pain medication when given. No other complaints. Denies chest pain/SOB/dizziness/n/v/HA   Walked the halls with PT yesterday.     OBJECTIVE:     Vital Signs Last 24 Hrs  T(C): 36.7 (28 Sep 2020 09:42), Max: 37.1 (27 Sep 2020 20:10)  T(F): 98.1 (28 Sep 2020 09:42), Max: 98.7 (27 Sep 2020 20:10)  HR: 89 (28 Sep 2020 12:16) (75 - 90)  BP: 112/63 (28 Sep 2020 12:16) (93/53 - 112/63)  BP(mean): --  RR: 16 (28 Sep 2020 12:16) (16 - 19)  SpO2: 94% (28 Sep 2020 12:16) (94% - 100%)    PE:          Dressing: clean/dry/intact to right knee and left leg/thigh- abd dressing  and paper tape noted. Changed this AM by consulting team per patient.  [place] in place RLE.   Sensation: intact to light touch to patient's baseline BLE, diminished throughout due to neuropathy          Motor exam:  wiggles toes BLE, 5/5 ehl/fhl BLE and ta/gs LLE.          Skin warm, well-perfused; capillary refill brisk. DP palpable.              LABS:                        9.4    8.84  )-----------( 481      ( 28 Sep 2020 05:36 )             32.9     09-28    134<L>  |  101  |  16  ----------------------------<  92  4.5   |  22  |  0.82    Ca    8.4      28 Sep 2020 05:36    TPro  6.1  /  Alb  2.1<L>  /  TBili  0.5  /  DBili  x   /  AST  33  /  ALT  24  /  AlkPhos  133<H>  09-28    ASSESSMENT AND PLAN:  s/p revision right knee antibiotic spacer and STSG   1. Stable. Continue ABX. Will monitor labs. ESR, CRP, CK trending down.  Will Work towards discharge planning.   -Appreciate Plastics recs, will continue to keep STSG donor sites w tegaderm, wound care and daily dressing changes with bacitracin to graft, adaptic, abd pads and tape- performed this AM.   - ID recs noted, discontinued dapto and started vanco 1.25g q24hr. C/w rifampin  - Podiatry recs: right foot ulcers to 2nd and 3rd digits- bactroban to 2nd digit ulcers; left foot ulcer cling/dsd  - continue prasugrel and ASA as pt had stent less than 2 years ago. V tach/defibrillator hx- asxs. Continue off monitor.   2. Analgesic pain control  3. DVT prophylaxis: effient/asa  4. Weight Bearing Status:   TTWB RLE in [place] (Plastics ok with WB)   5. Disposition: Pending [facility]

## 2020-09-28 NOTE — PROGRESS NOTE ADULT - SUBJECTIVE AND OBJECTIVE BOX
Patient had no issues overnight.  According to him he did not see any discharge coming from [facility].  Denies abdominal pain, nausea or vomiting.    AVSS    MEDICATIONS  (STANDING):  aspirin enteric coated 81 milliGRAM(s) Oral two times a day  BACItracin   Ointment 1 Application(s) Topical daily  chlorhexidine 2% Cloths 1 Application(s) Topical <User Schedule>  DAPTOmycin IVPB 650 milliGRAM(s) IV Intermittent every 24 hours  dextrose 5%. 1000 milliLiter(s) (50 mL/Hr) IV Continuous <Continuous>  dextrose 5%. 1000 milliLiter(s) (50 mL/Hr) IV Continuous <Continuous>  dextrose 50% Injectable 12.5 Gram(s) IV Push once  dextrose 50% Injectable 25 Gram(s) IV Push once  dextrose 50% Injectable 25 Gram(s) IV Push once  digoxin     Tablet 0.125 milliGRAM(s) Oral daily  gabapentin 300 milliGRAM(s) Oral three times a day  insulin glargine Injectable (LANTUS) 20 Unit(s) SubCutaneous at bedtime  insulin lispro (HumaLOG) corrective regimen sliding scale   SubCutaneous Before meals and at bedtime  insulin lispro Injectable (HumaLOG) 8 Unit(s) SubCutaneous three times a day before meals  lactated ringers. 1000 milliLiter(s) (75 mL/Hr) IV Continuous <Continuous>  metoprolol succinate ER 25 milliGRAM(s) Oral daily  nystatin Cream 1 Application(s) Topical two times a day  polyethylene glycol 3350 17 Gram(s) Oral daily  prasugrel 10 milliGRAM(s) Oral daily  rifAMPin 300 milliGRAM(s) Oral every 12 hours  rosuvastatin 10 milliGRAM(s) Oral at bedtime  sertraline 25 milliGRAM(s) Oral daily  spironolactone 25 milliGRAM(s) Oral daily  tamsulosin 0.4 milliGRAM(s) Oral at bedtime    MEDICATIONS  (PRN):  cyclobenzaprine 10 milliGRAM(s) Oral three times a day PRN Muscle Spasm  dextrose 40% Gel 15 Gram(s) Oral once PRN Blood Glucose LESS THAN 70 milliGRAM(s)/deciliter  dextrose 40% Gel 15 Gram(s) Oral once PRN Blood Glucose LESS THAN 70 milliGRAM(s)/deciliter  diphenhydrAMINE 50 milliGRAM(s) Oral every 4 hours PRN Rash and/or Itching  glucagon  Injectable 1 milliGRAM(s) IntraMuscular once PRN Glucose LESS THAN 70 milligrams/deciliter  glucagon  Injectable 1 milliGRAM(s) IntraMuscular once PRN Glucose LESS THAN 70 milligrams/deciliter  metoclopramide Injectable 5 milliGRAM(s) IV Push every 8 hours PRN Nausea  morphine  - Injectable 4 milliGRAM(s) IV Push every 4 hours PRN breakthrough pain  morphine  IR 15 milliGRAM(s) Oral every 4 hours PRN Moderate Pain (4 - 6)  morphine  IR 30 milliGRAM(s) Oral every 4 hours PRN Severe Pain (7 - 10)  naloxone Injectable 0.1 milliGRAM(s) IV Push every 3 minutes PRN For ANY of the following changes in patient status:  A. RR LESS THAN 10 breaths per minute, B. Oxygen saturation LESS THAN 90%, C. Sedation score of 6  ondansetron Injectable 4 milliGRAM(s) IV Push every 6 hours PRN Nausea  senna 2 Tablet(s) Oral at bedtime PRN Constipation  simethicone 80 milliGRAM(s) Chew three times a day PRN Upset Stomach  sodium chloride 0.9% lock flush 10 milliLiter(s) IV Push every 1 hour PRN Pre/post blood products, medications, blood draw, and to maintain line patency      Vital Signs Last 24 Hrs  T(C): 36.7 (28 Sep 2020 09:42), Max: 37.1 (27 Sep 2020 20:10)  T(F): 98.1 (28 Sep 2020 09:42), Max: 98.7 (27 Sep 2020 20:10)  HR: 82 (28 Sep 2020 10:37) (75 - 90)  BP: 100/59 (28 Sep 2020 10:37) (93/53 - 110/63)  BP(mean): --  RR: 17 (28 Sep 2020 09:42) (17 - 19)  SpO2: 98% (28 Sep 2020 09:42) (93% - 100%)    Gen: NAD, resting comfortably in bed  Pulm: Good inspiratory effort, nonlabored breathing  C/V: NSR  Abd: Soft, non tender, nondistended. No rebound, no guarding. RUQ, no discharge could be expressed,  non erythematous nor tender  Extrem: WWP, no edema                          9.4    8.84  )-----------( 481      ( 28 Sep 2020 05:36 )             32.9   09-28    134<L>  |  101  |  16  ----------------------------<  92  4.5   |  22  |  0.82    Ca    8.4      28 Sep 2020 05:36    TPro  6.1  /  Alb  2.1<L>  /  TBili  0.5  /  DBili  x   /  AST  33  /  ALT  24  /  AlkPhos  133<H>  09-28    Specimen Source: .Body Fluid Abdominal Fluid (09.25.20 @ 00:58)    Culture - Acid Fast - Body Fluid w/Smear (09.25.20 @ 00:58)    Specimen Source: .Body Fluid Abdominal Fluid    Acid Fast Bacilli Smear:   No acid fast bacilli seen by fluorochrome stain    Culture Results:   Culture is being performed.    Culture - Fungal, Body Fluid (09.25.20 @ 00:57)    Specimen Source: .Body Fluid Abdominal Fluid    Culture Results:   Testing in progress    Culture - Body Fluid with Gram Stain (09.24.20 @ 13:40)    Gram Stain:   No organisms seen  Rare WBC's    Specimen Source: .Body Fluid Abdominal Fluid/ E-swab    Culture Results:   No growth to date

## 2020-09-28 NOTE — PROGRESS NOTE ADULT - ATTENDING COMMENTS
I have reviewed the medical record, including laboratory and radiographic studies, interviewed and examined the patient and discussed the plan with Dr. Ambrosio, the ID Resident and Dr. Castro.  Agree with above. He will be going to [facility].  Will change back to vancomycin as antibiotics will be supervised and doing so will facilitate placement.  Will continue to follow with you – ID Team 1.

## 2020-09-28 NOTE — PROGRESS NOTE ADULT - SUBJECTIVE AND OBJECTIVE BOX
FLOWER MARISCAL  [id number]    Subjective:   pt is doing well, no o/n events  Patient is a 65y old  Male who presents with a chief complaint of R Knee Swelling (28 Sep 2020 06:36)       Objective:  T(C): 36.8 (09-28-20 @ 05:07), Max: 37.1 (09-27-20 @ 20:10)  HR: 75 (09-28-20 @ 05:07) (75 - 90)  BP: 104/70 (09-28-20 @ 05:07) (93/52 - 104/70)  RR: 18 (09-28-20 @ 05:07) (17 - 19)  SpO2: 100% (09-28-20 @ 05:07) (93% - 100%)  Wt(kg): --   09-28    134<L>  |  101  |  16  ----------------------------<  92  4.5   |  22  |  0.82    Ca    8.4      28 Sep 2020 05:36    TPro  6.1  /  Alb  2.1<L>  /  TBili  0.5  /  DBili  x   /  AST  33  /  ALT  24  /  AlkPhos  133<H>  09-28                        9.4    8.84  )-----------( 481      ( 28 Sep 2020 05:36 )             32.9       09-27 @ 07:01  -  09-28 @ 07:00  --------------------------------------------------------  IN: 1245 mL / OUT: 1060 mL / NET: 185 mL      PHYSICAL EXAM:    General: NAD, laying in bed  Extremities: Right knee wound clean, no signs of infection or necrosis, LLE shin wound also no signs of infection or necrosis          MEDICATIONS  (STANDING):  aspirin enteric coated 81 milliGRAM(s) Oral two times a day  BACItracin   Ointment 1 Application(s) Topical daily  chlorhexidine 2% Cloths 1 Application(s) Topical <User Schedule>  DAPTOmycin IVPB 650 milliGRAM(s) IV Intermittent every 24 hours  dextrose 5%. 1000 milliLiter(s) (50 mL/Hr) IV Continuous <Continuous>  dextrose 5%. 1000 milliLiter(s) (50 mL/Hr) IV Continuous <Continuous>  dextrose 50% Injectable 12.5 Gram(s) IV Push once  dextrose 50% Injectable 25 Gram(s) IV Push once  dextrose 50% Injectable 25 Gram(s) IV Push once  digoxin     Tablet 0.125 milliGRAM(s) Oral daily  gabapentin 300 milliGRAM(s) Oral three times a day  insulin glargine Injectable (LANTUS) 20 Unit(s) SubCutaneous at bedtime  insulin lispro (HumaLOG) corrective regimen sliding scale   SubCutaneous Before meals and at bedtime  insulin lispro Injectable (HumaLOG) 8 Unit(s) SubCutaneous three times a day before meals  lactated ringers. 1000 milliLiter(s) (75 mL/Hr) IV Continuous <Continuous>  metoprolol succinate ER 25 milliGRAM(s) Oral daily  nystatin Cream 1 Application(s) Topical two times a day  polyethylene glycol 3350 17 Gram(s) Oral daily  prasugrel 10 milliGRAM(s) Oral daily  rifAMPin 300 milliGRAM(s) Oral every 12 hours  rosuvastatin 10 milliGRAM(s) Oral at bedtime  sertraline 25 milliGRAM(s) Oral daily  spironolactone 25 milliGRAM(s) Oral daily  tamsulosin 0.4 milliGRAM(s) Oral at bedtime    MEDICATIONS  (PRN):  cyclobenzaprine 10 milliGRAM(s) Oral three times a day PRN Muscle Spasm  dextrose 40% Gel 15 Gram(s) Oral once PRN Blood Glucose LESS THAN 70 milliGRAM(s)/deciliter  dextrose 40% Gel 15 Gram(s) Oral once PRN Blood Glucose LESS THAN 70 milliGRAM(s)/deciliter  diphenhydrAMINE 50 milliGRAM(s) Oral every 4 hours PRN Rash and/or Itching  glucagon  Injectable 1 milliGRAM(s) IntraMuscular once PRN Glucose LESS THAN 70 milligrams/deciliter  glucagon  Injectable 1 milliGRAM(s) IntraMuscular once PRN Glucose LESS THAN 70 milligrams/deciliter  metoclopramide Injectable 5 milliGRAM(s) IV Push every 8 hours PRN Nausea  morphine  - Injectable 4 milliGRAM(s) IV Push every 4 hours PRN breakthrough pain  morphine  IR 15 milliGRAM(s) Oral every 4 hours PRN Moderate Pain (4 - 6)  morphine  IR 30 milliGRAM(s) Oral every 4 hours PRN Severe Pain (7 - 10)  naloxone Injectable 0.1 milliGRAM(s) IV Push every 3 minutes PRN For ANY of the following changes in patient status:  A. RR LESS THAN 10 breaths per minute, B. Oxygen saturation LESS THAN 90%, C. Sedation score of 6  ondansetron Injectable 4 milliGRAM(s) IV Push every 6 hours PRN Nausea  senna 2 Tablet(s) Oral at bedtime PRN Constipation  simethicone 80 milliGRAM(s) Chew three times a day PRN Upset Stomach  sodium chloride 0.9% lock flush 10 milliLiter(s) IV Push every 1 hour PRN Pre/post blood products, medications, blood draw, and to maintain line patency      Assessment/Plan:  Patient is a 65y old  Male who presents with a chief complaint of R Knee Swelling (28 Sep 2020 06:36)

## 2020-09-28 NOTE — PROGRESS NOTE ADULT - ASSESSMENT
A/P 65M s/p right gastroc and left LE debridement with STSG    - Care per primary team  - Wounds being dressed with adaptic, bacitracin and ABD pads  - Please continue to keep STSG donor sites with Tegaderm

## 2020-09-28 NOTE — PROGRESS NOTE ADULT - SUBJECTIVE AND OBJECTIVE BOX
STATUS POST: Right knee abx spacer                      SUBJECTIVE: Patient seen and examined. Pt complaining that his pain was not well controlled this weekend and was undermedicated. Denies any other complaints. States PT is difficult but will keep working.   Denies any sob/cp/n/v.     OBJECTIVE:     Vital Signs Last 24 Hrs  T(C): 36.8 (28 Sep 2020 05:07), Max: 37.1 (27 Sep 2020 20:10)  T(F): 98.3 (28 Sep 2020 05:07), Max: 98.7 (27 Sep 2020 20:10)  HR: 75 (28 Sep 2020 05:07) (75 - 90)  BP: 104/70 (28 Sep 2020 05:07) (93/52 - 104/70)  BP(mean): --  RR: 18 (28 Sep 2020 05:07) (17 - 19)  SpO2: 100% (28 Sep 2020 05:07) (93% - 100%)    RLE:bacitracin to graft, adaptic, ABD pads and tape with [place]   DSG: Pulses: +DP;  feet cool to touch; at baseline; no edema  Sensation: Sensation intact to baseline (diminished sensation- h/o severe neuropathy; follows with vascular Dr. Malloy outpatient)  Motor: 5/5 EHL/FHL; limiting dorsi and plantar flexion on R as per plastics    LLE:  DSG: bacitracin to graft, adaptic, ABD pads and tape  Pulses: feet cool to touch; at baseline; no edema  Sensation: SILT to baseline (diminished sensation- h/o severe neuropathy; follows with vascular Dr. Malloy outpatient)  Motor: 5/5 EHL/FHL/TA/GS               I&O's Detail    24 Sep 2020 07:01  -  25 Sep 2020 07:00  --------------------------------------------------------  IN:    Oral Fluid: 1930 mL  Total IN: 1930 mL    OUT:    Bulb (mL): 100 mL    Estimated Blood Loss (mL): 0 mL    VAC (Vacuum Assisted Closure) System (mL): 40 mL    VAC (Vacuum Assisted Closure) System (mL): 100 mL    Voided (mL): 1750 mL  Total OUT: 1990 mL    Total NET: -60 mL          LABS:                        8.8    10.82 )-----------( 339      ( 25 Sep 2020 07:25 )             30.4     09-25    134<L>  |  102  |  18  ----------------------------<  123<H>  4.0   |  24  |  0.88    Ca    8.5      25 Sep 2020 07:25  Phos  2.8     09-23  Mg     1.8     09-23    TPro  5.7<L>  /  Alb  2.2<L>  /  TBili  0.4  /  DBili  x   /  AST  24  /  ALT  22  /  AlkPhos  109  09-25          MEDICATIONS:  cefepime   IVPB 2000 milliGRAM(s) IV Intermittent every 8 hours  DAPTOmycin IVPB 650 milliGRAM(s) IV Intermittent every 24 hours  rifAMPin 300 milliGRAM(s) Oral every 12 hours    cyclobenzaprine 10 milliGRAM(s) Oral three times a day PRN  diphenhydrAMINE 50 milliGRAM(s) Oral every 4 hours PRN  gabapentin 300 milliGRAM(s) Oral three times a day  morphine  - Injectable 4 milliGRAM(s) IV Push every 4 hours PRN  morphine  IR 15 milliGRAM(s) Oral every 4 hours PRN  morphine  IR 30 milliGRAM(s) Oral every 4 hours PRN  ondansetron Injectable 4 milliGRAM(s) IV Push every 6 hours PRN  sertraline 25 milliGRAM(s) Oral daily    aspirin enteric coated 81 milliGRAM(s) Oral two times a day  prasugrel 10 milliGRAM(s) Oral daily        ASSESSMENT AND PLAN: 66yo Male s/p Revision right knee abx spacer by Dr. Castro, STSG by Dr. Mckeon    1. Analgesic pain control  2. DVT prophylaxis: effient/asa  3. Weight Bearing Status: TTWB right le in [place], plastics ok with weight bearing    4. Disposition: rehab  5. Plastics dressing with bacitracin, Adaptic, and ABD pads  6. F/u ESR/CRP on 9/26 q 48h, CBC AND CMP DAILY  7. Hyponatremia 134 been stable, no fluid restriction per cardio  8. H/H stable 9.4  9. Plastics recs-- Please continue to keep STSG donor sites with Tegaderm  Bilateral VAC dressings removed.   Wounds redressed with bacitracin to graft, adaptic, ABD pads and tape.  To be changed daily.  Please maintain knee immobilizer to RLE.  OK to bear weight bilaterally from Plastic Surgery perspective.  10. ID recs appreciated currently on dapto, and rifampin f/u CPK on 9/28  11. RUQ drainage- f/u cx ngtd  12. Appreciate podiatry recs-  Right foot found to have Rose grade 1 ulcers on 2nd and 3rd digits.  Applied Bactroban for ulcer sites on R & L foot 2nd digit  Dressed ulcer of L foot with cling/ DSD and R foot with a band-aid  13. Cardio recs- . He had a stent placed less than two years ago. continue prasugrel and aspirin.   Ventiricular tachycardia/Defibrillator- asymptomatic. Off monitor.   Hyponatremia- mild-agree with discontinuing fluid restriction  D/w Dr. Caldera about hypotension. pt. comfortable, low EF and asx will continue to monitor as patient run low bp wise.

## 2020-09-28 NOTE — PROGRESS NOTE ADULT - ASSESSMENT
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  He has completed 6 weeks of vancomycin & rifampin.  He is s/p R hip exchange of hardware on 9/4 - OR cultures NGTD.  I&D of abd wall done 9/4 - cultures growing Pseudomonas and Enterococcus faecium. Gallium SPECT done 9/10 showed minimal uptake from GB fossa to soft tissue and was interpreted by surgery as a negative study. S/p R knee spacer exchange, muscle flap and L shin ulcer debridement on 9/18.  Now again with purulent drainage from RUQ, s/p ultrasound on 9/22 showing no change in CBD dilation. 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  He has completed 6 weeks of vancomycin & rifampin.  He is s/p R hip exchange of hardware on 9/4 - OR cultures NGTD.  I&D of abd wall done 9/4 - cultures growing Pseudomonas and Enterococcus faecium. Gallium SPECT done 9/10 showed minimal uptake from GB fossa to soft tissue and was interpreted by surgery as a negative study. S/p R knee spacer exchange, muscle flap and L shin ulcer debridement on 9/18.      Recommendations:  - f/u RUQ/body fluid cultures (9/24)  - body fluid/RUQ cultures (9/22): NGTD  - fluid cx (9/5 & 9/4): Pseudomonas & VRE faecium  - c/w rifampin 300 mg po q12h  - stop daptomycin   - start vancomycin 1.25 g q 24 hrs  - tissue cx (9/18): NGTD    ID team 1 will follow

## 2020-09-28 NOTE — PROGRESS NOTE ADULT - ATTENDING COMMENTS
Patient seen and examined with house-staff during bedside rounds.  Resident note read, including vitals, physical findings, laboratory data, and radiological reports.   Revisions included below.  Direct personal management at bed side and extensive interpretation of the data.  Plan was outlined and discussed in details with the housestaff.  Decision making of high complexity  Action taken for acute disease activity to reflect the level of care provided:  - medication reconciliation  - review laboratory dataThe patient complained of dyspnea earlier today and is seems more on anxiety.  The patient is not tachypneic at this moment and the baseline oxygen saturation is stable on room air.  The chest x-ray I reviewed and compared to the previous one there may be some sort of increased opacity at the right lower lobe which could be consistent with atelectasis.  There is no clinical evidence of underlying pneumonia the patient is on antibiotic will observe for now

## 2020-09-29 LAB
CULTURE RESULTS: NO GROWTH — SIGNIFICANT CHANGE UP
GLUCOSE BLDC GLUCOMTR-MCNC: 106 MG/DL — HIGH (ref 70–99)
GLUCOSE BLDC GLUCOMTR-MCNC: 144 MG/DL — HIGH (ref 70–99)
GLUCOSE BLDC GLUCOMTR-MCNC: 76 MG/DL — SIGNIFICANT CHANGE UP (ref 70–99)
GLUCOSE BLDC GLUCOMTR-MCNC: 87 MG/DL — SIGNIFICANT CHANGE UP (ref 70–99)
NT-PROBNP SERPL-SCNC: 9665 PG/ML — HIGH (ref 0–300)
SPECIMEN SOURCE: SIGNIFICANT CHANGE UP

## 2020-09-29 PROCEDURE — 99232 SBSQ HOSP IP/OBS MODERATE 35: CPT | Mod: GC

## 2020-09-29 RX ORDER — MORPHINE SULFATE 50 MG/1
2 CAPSULE, EXTENDED RELEASE ORAL ONCE
Refills: 0 | Status: DISCONTINUED | OUTPATIENT
Start: 2020-09-29 | End: 2020-09-29

## 2020-09-29 RX ORDER — FUROSEMIDE 40 MG
20 TABLET ORAL ONCE
Refills: 0 | Status: COMPLETED | OUTPATIENT
Start: 2020-09-29 | End: 2020-09-29

## 2020-09-29 RX ADMIN — TAMSULOSIN HYDROCHLORIDE 0.4 MILLIGRAM(S): 0.4 CAPSULE ORAL at 21:35

## 2020-09-29 RX ADMIN — Medication 20 MILLIGRAM(S): at 13:33

## 2020-09-29 RX ADMIN — MORPHINE SULFATE 30 MILLIGRAM(S): 50 CAPSULE, EXTENDED RELEASE ORAL at 18:20

## 2020-09-29 RX ADMIN — MORPHINE SULFATE 4 MILLIGRAM(S): 50 CAPSULE, EXTENDED RELEASE ORAL at 16:15

## 2020-09-29 RX ADMIN — CHLORHEXIDINE GLUCONATE 1 APPLICATION(S): 213 SOLUTION TOPICAL at 06:36

## 2020-09-29 RX ADMIN — SPIRONOLACTONE 25 MILLIGRAM(S): 25 TABLET, FILM COATED ORAL at 06:35

## 2020-09-29 RX ADMIN — GABAPENTIN 300 MILLIGRAM(S): 400 CAPSULE ORAL at 13:33

## 2020-09-29 RX ADMIN — NYSTATIN CREAM 1 APPLICATION(S): 100000 CREAM TOPICAL at 11:52

## 2020-09-29 RX ADMIN — Medication 0.12 MILLIGRAM(S): at 06:35

## 2020-09-29 RX ADMIN — GABAPENTIN 300 MILLIGRAM(S): 400 CAPSULE ORAL at 21:35

## 2020-09-29 RX ADMIN — MORPHINE SULFATE 30 MILLIGRAM(S): 50 CAPSULE, EXTENDED RELEASE ORAL at 11:48

## 2020-09-29 RX ADMIN — POLYETHYLENE GLYCOL 3350 17 GRAM(S): 17 POWDER, FOR SOLUTION ORAL at 11:48

## 2020-09-29 RX ADMIN — ROSUVASTATIN CALCIUM 10 MILLIGRAM(S): 5 TABLET ORAL at 21:35

## 2020-09-29 RX ADMIN — Medication 25 MILLIGRAM(S): at 06:34

## 2020-09-29 RX ADMIN — MORPHINE SULFATE 4 MILLIGRAM(S): 50 CAPSULE, EXTENDED RELEASE ORAL at 15:54

## 2020-09-29 RX ADMIN — MORPHINE SULFATE 30 MILLIGRAM(S): 50 CAPSULE, EXTENDED RELEASE ORAL at 12:45

## 2020-09-29 RX ADMIN — MORPHINE SULFATE 2 MILLIGRAM(S): 50 CAPSULE, EXTENDED RELEASE ORAL at 07:10

## 2020-09-29 RX ADMIN — MORPHINE SULFATE 30 MILLIGRAM(S): 50 CAPSULE, EXTENDED RELEASE ORAL at 17:19

## 2020-09-29 RX ADMIN — MORPHINE SULFATE 30 MILLIGRAM(S): 50 CAPSULE, EXTENDED RELEASE ORAL at 21:36

## 2020-09-29 RX ADMIN — MORPHINE SULFATE 30 MILLIGRAM(S): 50 CAPSULE, EXTENDED RELEASE ORAL at 22:15

## 2020-09-29 RX ADMIN — Medication 8 UNIT(S): at 13:33

## 2020-09-29 RX ADMIN — Medication 81 MILLIGRAM(S): at 06:35

## 2020-09-29 RX ADMIN — PRASUGREL 10 MILLIGRAM(S): 5 TABLET, FILM COATED ORAL at 11:48

## 2020-09-29 RX ADMIN — NYSTATIN CREAM 1 APPLICATION(S): 100000 CREAM TOPICAL at 19:14

## 2020-09-29 RX ADMIN — Medication 1 APPLICATION(S): at 19:21

## 2020-09-29 RX ADMIN — MORPHINE SULFATE 4 MILLIGRAM(S): 50 CAPSULE, EXTENDED RELEASE ORAL at 19:21

## 2020-09-29 RX ADMIN — Medication 166.67 MILLIGRAM(S): at 17:14

## 2020-09-29 RX ADMIN — Medication 8 UNIT(S): at 08:26

## 2020-09-29 RX ADMIN — SERTRALINE 25 MILLIGRAM(S): 25 TABLET, FILM COATED ORAL at 11:48

## 2020-09-29 RX ADMIN — GABAPENTIN 300 MILLIGRAM(S): 400 CAPSULE ORAL at 06:35

## 2020-09-29 RX ADMIN — MORPHINE SULFATE 2 MILLIGRAM(S): 50 CAPSULE, EXTENDED RELEASE ORAL at 07:45

## 2020-09-29 RX ADMIN — Medication 81 MILLIGRAM(S): at 19:14

## 2020-09-29 RX ADMIN — MORPHINE SULFATE 4 MILLIGRAM(S): 50 CAPSULE, EXTENDED RELEASE ORAL at 19:45

## 2020-09-29 RX ADMIN — Medication 50 MILLIGRAM(S): at 00:07

## 2020-09-29 NOTE — PROGRESS NOTE ADULT - SUBJECTIVE AND OBJECTIVE BOX
SUBJECTIVE: Patient seen and examined. Pt complaining that his pain was not well controlled this weekend and was undermedicated. Denies any other complaints. States PT is difficult but will keep working.   Denies any sob/cp/n/v.     OBJECTIVE:     Vital Signs Last 24 Hrs  T(C): 36.7 (29 Sep 2020 06:39), Max: 36.8 (28 Sep 2020 18:18)  T(F): 98.1 (29 Sep 2020 06:39), Max: 98.3 (28 Sep 2020 18:18)  HR: 85 (29 Sep 2020 06:39) (76 - 89)  BP: 113/66 (29 Sep 2020 06:39) (100/59 - 113/66)  BP(mean): --  RR: 20 (29 Sep 2020 06:39) (16 - 20)  SpO2: 98% (29 Sep 2020 06:39) (94% - 98%)    RLE: bacitracin to graft, adaptic, ABD pads and tape with [place]   DSG: Pulses: +DP;  feet cool to touch; at baseline; no edema  Sensation: Sensation intact to baseline (diminished sensation- h/o severe neuropathy; follows with vascular Dr. Malloy outpatient)  Motor: 5/5 EHL/FHL; limiting dorsi and plantar flexion on R as per plastics    LLE:  DSG: bacitracin to graft, adaptic, ABD pads and tape  Pulses: feet cool to touch; at baseline; no edema  Sensation: SILT to baseline (diminished sensation- h/o severe neuropathy; follows with vascular Dr. Malloy outpatient)  Motor: 5/5 EHL/FHL/TA/GS             ASSESSMENT AND PLAN: 64yo Male s/p Revision right knee abx spacer by Dr. Castro, STSG by Dr. Mckeon    1. Analgesic pain control  2. DVT prophylaxis: effient/asa  3. Weight Bearing Status: TTWB right le in [place], plastics ok with weight bearing    4. Disposition: rehab  5. Plastics dressing with bacitracin, Adaptic, and ABD pads  6. F/u ESR/CRP on 9/26 q 48h, CBC AND CMP DAILY  7. Hyponatremia 134 been stable, no fluid restriction per cardio  8. H/H stable  9. Plastics recs-- Please continue to keep STSG donor sites with Tegaderm  Bilateral VAC dressings removed.   Wounds redressed with bacitracin to graft, adaptic, ABD pads and tape.  To be changed daily.  Please maintain knee immobilizer to RLE.  OK to bear weight bilaterally from Plastic Surgery perspective.  10. ID recs appreciated currently on dapto, and rifampin f/u CPK on 9/28  11. RUQ drainage- f/u cx ngtd  12. Appreciate podiatry recs-  Right foot found to have Rose grade 1 ulcers on 2nd and 3rd digits.  Applied Bactroban for ulcer sites on R & L foot 2nd digit  Dressed ulcer of L foot with cling/ DSD and R foot with a band-aid  13. Cardio recs- . He had a stent placed less than two years ago. continue prasugrel and aspirin.   Ventiricular tachycardia/Defibrillator- asymptomatic. Off monitor.   Hyponatremia- mild-agree with discontinuing fluid restriction  D/w Dr. Caldera about hypotension. pt. comfortable, low EF and asx will continue to monitor as patient run low bp wise.        SUBJECTIVE: Patient seen and examined. Pt complaining that his pain was not well controlled this weekend and was undermedicated. Denies any other complaints. States PT is difficult but will keep working.   Denies any sob/cp/n/v.     OBJECTIVE:     Vital Signs Last 24 Hrs  T(C): 36.7 (29 Sep 2020 06:39), Max: 36.8 (28 Sep 2020 18:18)  T(F): 98.1 (29 Sep 2020 06:39), Max: 98.3 (28 Sep 2020 18:18)  HR: 85 (29 Sep 2020 06:39) (76 - 89)  BP: 113/66 (29 Sep 2020 06:39) (100/59 - 113/66)  BP(mean): --  RR: 20 (29 Sep 2020 06:39) (16 - 20)  SpO2: 98% (29 Sep 2020 06:39) (94% - 98%)    RLE: bacitracin to graft, adaptic, ABD pads and tape with [place]   DSG: Pulses: +DP;  feet cool to touch; at baseline; no edema  Sensation: Sensation intact to baseline (diminished sensation- h/o severe neuropathy; follows with vascular Dr. Malloy outpatient)  Motor: 5/5 EHL/FHL; limiting dorsi and plantar flexion on R as per plastics    LLE:  DSG: bacitracin to graft, adaptic, ABD pads and tape  Pulses: feet cool to touch; at baseline; no edema  Sensation: SILT to baseline (diminished sensation- h/o severe neuropathy; follows with vascular Dr. Malloy outpatient)  Motor: 5/5 EHL/FHL/TA/GS             ASSESSMENT AND PLAN: 64yo Male s/p Revision right knee abx spacer by Dr. Castro, STSG by Dr. Mckeon    1. Analgesic pain control  2. DVT prophylaxis: effient/asa  3. Weight Bearing Status: TTWB right le in [place], plastics ok with weight bearing    4. Disposition: rehab  5. Plastics dressing with bacitracin, Adaptic, and ABD pads  6. CBC AND CMP DAILY; ESR/CRP Q48Hr  7. Hyponatremia 134 been stable, no fluid restriction per cardio  8. H/H stable  9. Plastics recs-- Please continue to keep STSG donor sites with Tegaderm  Bilateral VAC dressings removed.   Wounds redressed with bacitracin to graft, adaptic, ABD pads and tape.  To be changed daily.  Please maintain knee immobilizer to RLE.  OK to bear weight bilaterally from Plastic Surgery perspective.  10. ID recs appreciated currently on dapto, and rifampin f/u CPK on 9/28  11. RUQ drainage- f/u cx ngtd  12. Appreciate podiatry recs-  Right foot found to have Rose grade 1 ulcers on 2nd and 3rd digits.  Applied Bactroban for ulcer sites on R & L foot 2nd digit  Dressed ulcer of L foot with cling/ DSD and R foot with a band-aid  13. Cardio recs- . He had a stent placed less than two years ago. continue prasugrel and aspirin.   Ventiricular tachycardia/Defibrillator- asymptomatic. Off monitor.   Hyponatremia- mild-agree with discontinuing fluid restriction  D/w Dr. Caldera about hypotension. pt. comfortable, low EF and asx will continue to monitor as patient run low bp wise.

## 2020-09-29 NOTE — PROGRESS NOTE ADULT - SUBJECTIVE AND OBJECTIVE BOX
FLOWER LOIDA  [id number]    Subjective: No o/n events. Pt complaining that his knee is rubbing against knee immobilizer and giving him pain.  Patient is a 65y old  Male who presents with a chief complaint of R Knee Swelling (29 Sep 2020 07:21)       Objective:  T(C): 36.7 (09-29-20 @ 06:39), Max: 36.8 (09-28-20 @ 18:18)  HR: 85 (09-29-20 @ 06:39) (76 - 89)  BP: 113/66 (09-29-20 @ 06:39) (100/59 - 113/66)  RR: 20 (09-29-20 @ 06:39) (16 - 20)  SpO2: 98% (09-29-20 @ 06:39) (94% - 98%)  Wt(kg): --   09-28    134<L>  |  101  |  16  ----------------------------<  92  4.5   |  22  |  0.82    Ca    8.4      28 Sep 2020 05:36    TPro  6.1  /  Alb  2.1<L>  /  TBili  0.5  /  DBili  x   /  AST  33  /  ALT  24  /  AlkPhos  133<H>  09-28                        9.4    8.84  )-----------( 481      ( 28 Sep 2020 05:36 )             32.9       09-28 @ 07:01  -  09-29 @ 07:00  --------------------------------------------------------  IN: 900 mL / OUT: 735 mL / NET: 165 mL      PHYSICAL EXAM:    General: NAD, laying in bed  Resp: Non-laboured breathing  Extremities: Right knee wound clean, no signs of infection or necrosis, LLE shin wound also no signs of infection or necrosis. Donor sites pink, draining normally            MEDICATIONS  (STANDING):  aspirin enteric coated 81 milliGRAM(s) Oral two times a day  BACItracin   Ointment 1 Application(s) Topical daily  chlorhexidine 2% Cloths 1 Application(s) Topical <User Schedule>  dextrose 5%. 1000 milliLiter(s) (50 mL/Hr) IV Continuous <Continuous>  dextrose 5%. 1000 milliLiter(s) (50 mL/Hr) IV Continuous <Continuous>  dextrose 50% Injectable 12.5 Gram(s) IV Push once  dextrose 50% Injectable 25 Gram(s) IV Push once  dextrose 50% Injectable 25 Gram(s) IV Push once  digoxin     Tablet 0.125 milliGRAM(s) Oral daily  gabapentin 300 milliGRAM(s) Oral three times a day  insulin glargine Injectable (LANTUS) 20 Unit(s) SubCutaneous at bedtime  insulin lispro (HumaLOG) corrective regimen sliding scale   SubCutaneous Before meals and at bedtime  insulin lispro Injectable (HumaLOG) 8 Unit(s) SubCutaneous three times a day before meals  lactated ringers. 1000 milliLiter(s) (75 mL/Hr) IV Continuous <Continuous>  metoprolol succinate ER 25 milliGRAM(s) Oral daily  nystatin Cream 1 Application(s) Topical two times a day  polyethylene glycol 3350 17 Gram(s) Oral daily  prasugrel 10 milliGRAM(s) Oral daily  rifAMPin 300 milliGRAM(s) Oral every 12 hours  rosuvastatin 10 milliGRAM(s) Oral at bedtime  sertraline 25 milliGRAM(s) Oral daily  spironolactone 25 milliGRAM(s) Oral daily  tamsulosin 0.4 milliGRAM(s) Oral at bedtime  vancomycin  IVPB 1250 milliGRAM(s) IV Intermittent every 24 hours    MEDICATIONS  (PRN):  cyclobenzaprine 10 milliGRAM(s) Oral three times a day PRN Muscle Spasm  dextrose 40% Gel 15 Gram(s) Oral once PRN Blood Glucose LESS THAN 70 milliGRAM(s)/deciliter  dextrose 40% Gel 15 Gram(s) Oral once PRN Blood Glucose LESS THAN 70 milliGRAM(s)/deciliter  diphenhydrAMINE 50 milliGRAM(s) Oral every 4 hours PRN Rash and/or Itching  glucagon  Injectable 1 milliGRAM(s) IntraMuscular once PRN Glucose LESS THAN 70 milligrams/deciliter  glucagon  Injectable 1 milliGRAM(s) IntraMuscular once PRN Glucose LESS THAN 70 milligrams/deciliter  metoclopramide Injectable 5 milliGRAM(s) IV Push every 8 hours PRN Nausea  morphine  - Injectable 4 milliGRAM(s) IV Push every 4 hours PRN breakthrough pain  morphine  IR 15 milliGRAM(s) Oral every 4 hours PRN Moderate Pain (4 - 6)  morphine  IR 30 milliGRAM(s) Oral every 4 hours PRN Severe Pain (7 - 10)  naloxone Injectable 0.1 milliGRAM(s) IV Push every 3 minutes PRN For ANY of the following changes in patient status:  A. RR LESS THAN 10 breaths per minute, B. Oxygen saturation LESS THAN 90%, C. Sedation score of 6  ondansetron Injectable 4 milliGRAM(s) IV Push every 6 hours PRN Nausea  senna 2 Tablet(s) Oral at bedtime PRN Constipation  simethicone 80 milliGRAM(s) Chew three times a day PRN Upset Stomach  sodium chloride 0.9% lock flush 10 milliLiter(s) IV Push every 1 hour PRN Pre/post blood products, medications, blood draw, and to maintain line patency

## 2020-09-29 NOTE — PROGRESS NOTE ADULT - SUBJECTIVE AND OBJECTIVE BOX
Patient states he is feeling fine today, and has no major complaints or any pertinent ROS.    infectious diseases progress note:  FLOWER MARISCAL is a 65yMale patient    KNEE PAIN      Neurocognitive disorder    Adjustment disorder with mixed anxiety and depressed mood    Postoperative state    Preoperative clearance    Leg wound, left    Atherosclerosis of coronary artery    HLD (hyperlipidemia)    HTN (hypertension)    CHF (congestive heart failure)    Diabetes    Acute pain of right knee        ROS:  CONSTITUTIONAL:  Negative fever or chills, feels well, good appetite  EYES:  Negative  blurry vision or double vision  CARDIOVASCULAR:  Negative for chest pain or palpitations  RESPIRATORY:  Negative for cough, wheezing, or SOB   GASTROINTESTINAL:  Negative for nausea, vomiting, diarrhea, constipation, or abdominal pain  GENITOURINARY:  Negative frequency, urgency or dysuria  NEUROLOGIC:  No headache, confusion, dizziness, lightheadedness    Allergies    ACE inhibitors (Hives)  carvedilol (Other)  enalapril (Hives)  Entresto (Other)    Intolerances        ANTIBIOTICS/RELEVANT:  antimicrobials  rifAMPin 300 milliGRAM(s) Oral every 12 hours  vancomycin  IVPB 1250 milliGRAM(s) IV Intermittent every 24 hours    immunologic:    OTHER:  aspirin enteric coated 81 milliGRAM(s) Oral two times a day  BACItracin   Ointment 1 Application(s) Topical daily  chlorhexidine 2% Cloths 1 Application(s) Topical <User Schedule>  cyclobenzaprine 10 milliGRAM(s) Oral three times a day PRN  dextrose 40% Gel 15 Gram(s) Oral once PRN  dextrose 40% Gel 15 Gram(s) Oral once PRN  dextrose 5%. 1000 milliLiter(s) IV Continuous <Continuous>  dextrose 5%. 1000 milliLiter(s) IV Continuous <Continuous>  dextrose 50% Injectable 12.5 Gram(s) IV Push once  dextrose 50% Injectable 25 Gram(s) IV Push once  dextrose 50% Injectable 25 Gram(s) IV Push once  digoxin     Tablet 0.125 milliGRAM(s) Oral daily  diphenhydrAMINE 50 milliGRAM(s) Oral every 4 hours PRN  gabapentin 300 milliGRAM(s) Oral three times a day  glucagon  Injectable 1 milliGRAM(s) IntraMuscular once PRN  glucagon  Injectable 1 milliGRAM(s) IntraMuscular once PRN  insulin glargine Injectable (LANTUS) 20 Unit(s) SubCutaneous at bedtime  insulin lispro (HumaLOG) corrective regimen sliding scale   SubCutaneous Before meals and at bedtime  insulin lispro Injectable (HumaLOG) 8 Unit(s) SubCutaneous three times a day before meals  metoclopramide Injectable 5 milliGRAM(s) IV Push every 8 hours PRN  metoprolol succinate ER 25 milliGRAM(s) Oral daily  morphine  - Injectable 4 milliGRAM(s) IV Push every 4 hours PRN  morphine  IR 15 milliGRAM(s) Oral every 4 hours PRN  morphine  IR 30 milliGRAM(s) Oral every 4 hours PRN  naloxone Injectable 0.1 milliGRAM(s) IV Push every 3 minutes PRN  nystatin Cream 1 Application(s) Topical two times a day  ondansetron Injectable 4 milliGRAM(s) IV Push every 6 hours PRN  polyethylene glycol 3350 17 Gram(s) Oral daily  prasugrel 10 milliGRAM(s) Oral daily  rosuvastatin 10 milliGRAM(s) Oral at bedtime  senna 2 Tablet(s) Oral at bedtime PRN  sertraline 25 milliGRAM(s) Oral daily  simethicone 80 milliGRAM(s) Chew three times a day PRN  sodium chloride 0.9% lock flush 10 milliLiter(s) IV Push every 1 hour PRN  spironolactone 25 milliGRAM(s) Oral daily  tamsulosin 0.4 milliGRAM(s) Oral at bedtime      Objective:  Vital Signs Last 24 Hrs  T(C): 36.6 (29 Sep 2020 17:35), Max: 37.3 (29 Sep 2020 13:37)  T(F): 97.8 (29 Sep 2020 17:35), Max: 99.2 (29 Sep 2020 13:37)  HR: 77 (29 Sep 2020 17:35) (74 - 90)  BP: 109/62 (29 Sep 2020 17:35) (103/63 - 113/66)  BP(mean): --  RR: 20 (29 Sep 2020 17:35) (17 - 20)  SpO2: 96% (29 Sep 2020 17:35) (96% - 100%)    PHYSICAL EXAM:  Constitutional:Well-developed, well nourishe  Eyes:DAMARIS, EOMI  Ear/Nose/Throat: no oral lesion, no sinus tenderness on percussion	  Neck:no JVD, no lymphadenopathy, supple  Respiratory: expiratory wheezes noted  Cardiovascular: S1S2 RRR, no murmurs  Gastrointestinal:soft, (+) BS, no HSM  Extremities:no e/e/c        LABS:                        9.4    8.84  )-----------( 481      ( 28 Sep 2020 05:36 )             32.9     09-28    134<L>  |  101  |  16  ----------------------------<  92  4.5   |  22  |  0.82    Ca    8.4      28 Sep 2020 05:36    TPro  6.1  /  Alb  2.1<L>  /  TBili  0.5  /  DBili  x   /  AST  33  /  ALT  24  /  AlkPhos  133<H>  09-28            MICROBIOLOGY:        RADIOLOGY & ADDITIONAL STUDIES:

## 2020-09-29 NOTE — PROGRESS NOTE ADULT - ASSESSMENT
A/P 65M s/p right gastroc and left LE debridement with STSG    - Care per primary team  - Right knee wound and left shin Wounds being dressed with adaptic, bacitracin and ABD pads  - Please leave STSG donor sites to air to allow them to dry out  - Pt can be discharged from plastic surgery's standpoint  - Ace over right knee

## 2020-09-29 NOTE — PROGRESS NOTE ADULT - SUBJECTIVE AND OBJECTIVE BOX
Pain Management Progress Note - [facility] [phone]        HPI: Patient seen and examined today. Patient with a history of knee pain, diabetic neuropathy, CHF, HTN, MRSA bacteremia, diverticulitis, COPD, hyperkalemia, chronic systolic CHF, osteoarthritis, s/p R Revision TKA and antibiotic spacer by Dr. Castro on 7/22, now presenting with R knee pain and swelling x3 days, now s/p R knee spacer exchange, gastro flap with skin graft.  Patient reports R knee pain, LLE pain, patient reports moderate pain relief with  current pain medication regimen. Patient Axox3, denies any itchiness from Morphine Po. Reviewed pain medication regimen with patient at bedside. Patient reports optimal pain relief when he receives takes Morphine Po 1 hour before PT.          Pain is _x__ sharp ____dull ___burning _x__achy ___ Intensity: ____ mild _x__mod x__severe     Location _x___surgical site ____cervical _____lumbar ____abd ____upper ext__x__lower ext    Worse with _x___activity _x___movement _____physical therapy___ Rest    Improved with _x___medication __x__rest ____physical therapy      furosemide   Injectable  morphine  - Injectable  morphine  - Injectable  vancomycin  IVPB  lactated ringers.  ketorolac   Injectable  metoclopramide Injectable  simethicone  dextrose 5%.  dextrose 40% Gel  glucagon  Injectable  mupirocin 2% Ointment  morphine  IR  morphine  IR  BACItracin   Ointment  mupirocin 2% Ointment  ketorolac   Injectable  gabapentin  morphine  - Injectable  ketorolac   Injectable  potassium phosphate / sodium phosphate Powder (PHOS-NaK)  insulin glargine Injectable (LANTUS)  chlorhexidine 2% Cloths  sodium chloride 0.9% lock flush  gabapentin  gabapentin  sertraline  rosuvastatin  senna  polyethylene glycol 3350  nystatin Cream  potassium phosphate / sodium phosphate Powder (PHOS-NaK)  insulin glargine Injectable (LANTUS)  insulin lispro Injectable (HumaLOG)  cyclobenzaprine  aspirin enteric coated  diphenhydrAMINE  tamsulosin  HYDROmorphone  Injectable  digoxin     Tablet  HYDROmorphone PCA (1 mG/mL)  prasugrel  aspirin enteric coated  ondansetron Injectable  naloxone Injectable  HYDROmorphone PCA (1 mG/mL)  spironolactone  digoxin     Tablet  glucagon  Injectable  dextrose 50% Injectable  dextrose 50% Injectable  dextrose 50% Injectable  dextrose 40% Gel  dextrose 5%.  insulin lispro (HumaLOG) corrective regimen sliding scale  magnesium sulfate  IVPB  metoprolol succinate ER  cefepime   IVPB  rifAMPin  DAPTOmycin IVPB  morphine  - Injectable  gabapentin  cyclobenzaprine  morphine  IR  morphine  IR  lactated ringers.  HYDROmorphone  Injectable  BUpivacaine liposome 1.3% Injectable (no eMAR)  morphine  IR  morphine  IR  morphine  - Injectable  lactated ringers.  BACItracin   Ointment  (ADM OVERRIDE)  mupirocin 2% Ointment  nystatin Cream  morphine  IR  morphine  IR  nystatin Powder  morphine  - Injectable  chlorhexidine 2% Cloths  insulin glargine Injectable (LANTUS)  insulin glargine Injectable (LANTUS)  insulin glargine Injectable (LANTUS)  pantoprazole  Injectable  metoclopramide Injectable  acetaminophen   Tablet ..  diphenhydrAMINE   Injectable  lactated ringers.  levothyroxine  sodium chloride 0.9% Bolus  gabapentin  gabapentin  gabapentin  sertraline  insulin glargine Injectable (LANTUS)  morphine  IR  morphine  IR  morphine  - Injectable  HYDROmorphone   Tablet  HYDROmorphone   Tablet  insulin lispro Injectable (HumaLOG)  diphenhydrAMINE  insulin glargine Injectable (LANTUS)  diazepam    Tablet  cefepime   IVPB  cefepime   IVPB  cefepime   IVPB  DAPTOmycin IVPB      ROS: Const:  -___febrile   Eyes:___ENT:___CV: __-_chest pain  Resp: __-__sob  GI:_-__nausea __-_vomiting _-_ abd pain ___npo ___clears _x_full diet __bm  :___ Musk: _x__pain _-__spasm  Skin:___ Neuro:  _-__ywunfghr_-__mbushbsvt_-__ numbness _-__weakness __x_paresth  Psych:_-_anxiety  Endo:___ Heme:___Allergy:_________, _x__all others reviewed and negative      PAST MEDICAL & SURGICAL HISTORY:  Diabetic neuropathy  STEMI (ST elevation myocardial infarction)  Diverticulitis  MRSA bacteremia  History of celiac disease  CHF (congestive heart failure): EF ~ 25%  HTN (hypertension)  Diabetes: on insulin pump  Blood clot due to device, implant, or graft: was on blood thinners  HLD (hyperlipidemia)  Osteoarthritis  Atherosclerosis of coronary artery: CAD (coronary artery disease)  Status post percutaneous transluminal coronary angioplasty: in 2012  History of open reduction and internal fixation (ORIF) procedure  Surgery, elective: Right shoulder  Surgery, elective: right knee wound debridement  S/P TKR (total knee replacement), right: with infection Mrsa   per pt he was cleared from MRSA infection  S/P CABG x 1: 2018  Stented coronary artery: 10/18 heart attack  INFERIOR WALL MI  Other postprocedural status: Fixation hardware in foot LEFT          Hemoglobin: 9.4 g/dL (09-28 @ 05:36)        T(C): 37.3 (09-29-20 @ 13:37), Max: 37.3 (09-29-20 @ 13:37)  HR: 74 (09-29-20 @ 13:37) (74 - 90)  BP: 103/63 (09-29-20 @ 13:37) (103/63 - 113/66)  RR: 17 (09-29-20 @ 13:37) (17 - 20)  SpO2: 96% (09-29-20 @ 13:37) (96% - 100%)  Wt(kg): --        PHYSICAL EXAM:  Gen Appearance: x___no acute distress _x__appropriate        Neuro: _x__SILT feet____ EOM Intact Psych: AAOX__3, x___mood/affect appropriate        Eyes: __x_conjunctiva WNL  __x__ Pupils equal and round        ENT: x___ears and nose atraumatic_x__ Hearing grossly intact        Neck: _x__trachea midline, no visible masses ___thyroid without palpable mass    Resp: _x__Nml WOB____No tactile fremitus ___clear to auscultation    Cardio: ___extremities free from edema __x__pedal pulses palpable    GI/Abdomen: __x_soft ___x__ Nontender___x___Nondistended_____HSM    Lymphatic: ___no palpable nodes in neck  ___no palpable nodes calves and feet    Skin/Wound: ___Incision, _x__Dressing c/d/i,   ____surrounding tissues soft,  ___drain/chest tube present____    Muscular: EHL _4__/5  Gastrocnemius_4__/5    ___absent clubbing/cyanosis          ASSESSMENT: This is a 65y old Male with a history of knee pain, diabetic neuropathy, CHF, HTN, MRSA bacteremia, diverticulitis, COPD, hyperkalemia, chronic systolic CHF, osteoarthritis,  s/p R Revision TKA and antibiotic spacer by Dr. Castro on 7/22, now presenting with R knee pain and swelling x3 days, now s/p R knee spacer exchange, gastro flap with skin graft, pain controlled with current pain medication regimen.      Recommended Treatment PLAN:  1. Morphine IR 30mg Po Q4h prn moderate to severe pain  2. Flexeril 5mg PO Q8h prn muscle spasms  3. Gabapentin 300mg PO TID  4. Morphine 4mg IVP Q4h prn breakthrough pain  Plan discussed with Dr. Chavez     Pain Management Progress Note - [facility] [phone]        HPI: Patient seen and examined today. Patient with a history of knee pain, diabetic neuropathy, CHF, HTN, MRSA bacteremia, diverticulitis, COPD, hyperkalemia, chronic systolic CHF, osteoarthritis, s/p R Revision TKA and antibiotic spacer by Dr. Castro on 7/22, now presenting with R knee pain and swelling x3 days, now s/p R knee spacer exchange, gastro flap with skin graft.  Patient reports R knee pain, LLE pain, patient reports moderate pain relief with  current pain medication regimen. Patient Axox3, denies any itchiness from Morphine Po. Reviewed pain medication regimen with patient at bedside. Patient reports optimal pain relief when he receives takes Morphine Po 1 hour before PT.          Pain is _x__ sharp ____dull ___burning _x__achy ___ Intensity: ____ mild _x__mod x__severe     Location _x___surgical site ____cervical _____lumbar ____abd ____upper ext__x__lower ext    Worse with _x___activity _x___movement _____physical therapy___ Rest    Improved with _x___medication __x__rest ____physical therapy      furosemide   Injectable  morphine  - Injectable  morphine  - Injectable  vancomycin  IVPB  lactated ringers.  ketorolac   Injectable  metoclopramide Injectable  simethicone  dextrose 5%.  dextrose 40% Gel  glucagon  Injectable  mupirocin 2% Ointment  morphine  IR  morphine  IR  BACItracin   Ointment  mupirocin 2% Ointment  ketorolac   Injectable  gabapentin  morphine  - Injectable  ketorolac   Injectable  potassium phosphate / sodium phosphate Powder (PHOS-NaK)  insulin glargine Injectable (LANTUS)  chlorhexidine 2% Cloths  sodium chloride 0.9% lock flush  gabapentin  gabapentin  sertraline  rosuvastatin  senna  polyethylene glycol 3350  nystatin Cream  potassium phosphate / sodium phosphate Powder (PHOS-NaK)  insulin glargine Injectable (LANTUS)  insulin lispro Injectable (HumaLOG)  cyclobenzaprine  aspirin enteric coated  diphenhydrAMINE  tamsulosin  HYDROmorphone  Injectable  digoxin     Tablet  HYDROmorphone PCA (1 mG/mL)  prasugrel  aspirin enteric coated  ondansetron Injectable  naloxone Injectable  HYDROmorphone PCA (1 mG/mL)  spironolactone  digoxin     Tablet  glucagon  Injectable  dextrose 50% Injectable  dextrose 50% Injectable  dextrose 50% Injectable  dextrose 40% Gel  dextrose 5%.  insulin lispro (HumaLOG) corrective regimen sliding scale  magnesium sulfate  IVPB  metoprolol succinate ER  cefepime   IVPB  rifAMPin  DAPTOmycin IVPB  morphine  - Injectable  gabapentin  cyclobenzaprine  morphine  IR  morphine  IR  lactated ringers.  HYDROmorphone  Injectable  BUpivacaine liposome 1.3% Injectable (no eMAR)  morphine  IR  morphine  IR  morphine  - Injectable  lactated ringers.  BACItracin   Ointment  (ADM OVERRIDE)  mupirocin 2% Ointment  nystatin Cream  morphine  IR  morphine  IR  nystatin Powder  morphine  - Injectable  chlorhexidine 2% Cloths  insulin glargine Injectable (LANTUS)  insulin glargine Injectable (LANTUS)  insulin glargine Injectable (LANTUS)  pantoprazole  Injectable  metoclopramide Injectable  acetaminophen   Tablet ..  diphenhydrAMINE   Injectable  lactated ringers.  levothyroxine  sodium chloride 0.9% Bolus  gabapentin  gabapentin  gabapentin  sertraline  insulin glargine Injectable (LANTUS)  morphine  IR  morphine  IR  morphine  - Injectable  HYDROmorphone   Tablet  HYDROmorphone   Tablet  insulin lispro Injectable (HumaLOG)  diphenhydrAMINE  insulin glargine Injectable (LANTUS)  diazepam    Tablet  cefepime   IVPB  cefepime   IVPB  cefepime   IVPB  DAPTOmycin IVPB      ROS: Const:  -___febrile   Eyes:___ENT:___CV: __-_chest pain  Resp: __-__sob  GI:_-__nausea __-_vomiting _-_ abd pain ___npo ___clears _x_full diet __bm  :___ Musk: _x__pain _-__spasm  Skin:___ Neuro:  _-__hiqwddby_-__svumrxqvu_-__ numbness _-__weakness __x_paresth  Psych:_-_anxiety  Endo:___ Heme:___Allergy:_________, _x__all others reviewed and negative      PAST MEDICAL & SURGICAL HISTORY:  Diabetic neuropathy  STEMI (ST elevation myocardial infarction)  Diverticulitis  MRSA bacteremia  History of celiac disease  CHF (congestive heart failure): EF ~ 25%  HTN (hypertension)  Diabetes: on insulin pump  Blood clot due to device, implant, or graft: was on blood thinners  HLD (hyperlipidemia)  Osteoarthritis  Atherosclerosis of coronary artery: CAD (coronary artery disease)  Status post percutaneous transluminal coronary angioplasty: in 2012  History of open reduction and internal fixation (ORIF) procedure  Surgery, elective: Right shoulder  Surgery, elective: right knee wound debridement  S/P TKR (total knee replacement), right: with infection Mrsa   per pt he was cleared from MRSA infection  S/P CABG x 1: 2018  Stented coronary artery: 10/18 heart attack  INFERIOR WALL MI  Other postprocedural status: Fixation hardware in foot LEFT          Hemoglobin: 9.4 g/dL (09-28 @ 05:36)        T(C): 37.3 (09-29-20 @ 13:37), Max: 37.3 (09-29-20 @ 13:37)  HR: 74 (09-29-20 @ 13:37) (74 - 90)  BP: 103/63 (09-29-20 @ 13:37) (103/63 - 113/66)  RR: 17 (09-29-20 @ 13:37) (17 - 20)  SpO2: 96% (09-29-20 @ 13:37) (96% - 100%)  Wt(kg): --        PHYSICAL EXAM:  Gen Appearance: x___no acute distress _x__appropriate        Neuro: _x__SILT feet____ EOM Intact Psych: AAOX__3, x___mood/affect appropriate        Eyes: __x_conjunctiva WNL  __x__ Pupils equal and round        ENT: x___ears and nose atraumatic_x__ Hearing grossly intact        Neck: _x__trachea midline, no visible masses ___thyroid without palpable mass    Resp: _x__Nml WOB____No tactile fremitus ___clear to auscultation    Cardio: ___extremities free from edema __x__pedal pulses palpable    GI/Abdomen: __x_soft ___x__ Nontender___x___Nondistended_____HSM    Lymphatic: ___no palpable nodes in neck  ___no palpable nodes calves and feet    Skin/Wound: ___Incision, _x__Dressing c/d/i,   ____surrounding tissues soft,  ___drain/chest tube present____    Muscular: EHL _4__/5  Gastrocnemius_4__/5    ___absent clubbing/cyanosis          ASSESSMENT: This is a 65y old Male with a history of knee pain, diabetic neuropathy, CHF, HTN, MRSA bacteremia, diverticulitis, COPD, hyperkalemia, chronic systolic CHF, osteoarthritis,  s/p R Revision TKA and antibiotic spacer by Dr. Castro on 7/22, now presenting with R knee pain and swelling x3 days, now s/p R knee spacer exchange, gastro flap with skin graft, pain controlled with current pain medication regimen.      Recommended Treatment PLAN:  1. Morphine IR 30mg Po Q4h prn moderate to severe pain  2. Flexeril 5mg PO Q8h prn muscle spasms  3. Gabapentin 300mg PO TID  4. Morphine 4mg IVP Q4h prn breakthrough pain  Plan discussed with Dr. Max

## 2020-09-29 NOTE — PROGRESS NOTE ADULT - ATTENDING COMMENTS
I have reviewed the medical record, including laboratory and radiographic studies, interviewed and examined the patient and discussed the plan with Dr. Elizabeth, the ID Resident.  Agree with above. Will continue to follow with you – ID Team 1.

## 2020-09-29 NOTE — PROGRESS NOTE ADULT - SUBJECTIVE AND OBJECTIVE BOX
Interval Events: Reviewed  Patient seen and examined at bedside.    Patient is a 65y old  Male who presents with a chief complaint of R Knee Swelling (29 Sep 2020 17:47)  he is occasionally sob    PAST MEDICAL & SURGICAL HISTORY:  Diabetic neuropathy    STEMI (ST elevation myocardial infarction)    Diverticulitis    MRSA bacteremia    History of celiac disease    CHF (congestive heart failure)  EF ~ 25%    HTN (hypertension)    Diabetes  on insulin pump    Blood clot due to device, implant, or graft  was on blood thinners    HLD (hyperlipidemia)    Osteoarthritis    Atherosclerosis of coronary artery  CAD (coronary artery disease)    Status post percutaneous transluminal coronary angioplasty  in 2012    History of open reduction and internal fixation (ORIF) procedure    Surgery, elective  Right shoulder    Surgery, elective  right knee wound debridement    S/P TKR (total knee replacement), right  with infection Mrsa   per pt he was cleared from MRSA infection    S/P CABG x 1  2018    Stented coronary artery  10/18 heart attack  INFERIOR WALL MI    Other postprocedural status  Fixation hardware in foot LEFT        MEDICATIONS:  Pulmonary:    Antimicrobials:  rifAMPin 300 milliGRAM(s) Oral every 12 hours  vancomycin  IVPB 1250 milliGRAM(s) IV Intermittent every 24 hours    Anticoagulants:  aspirin enteric coated 81 milliGRAM(s) Oral two times a day  prasugrel 10 milliGRAM(s) Oral daily    Cardiac:  digoxin     Tablet 0.125 milliGRAM(s) Oral daily  metoprolol succinate ER 25 milliGRAM(s) Oral daily  spironolactone 25 milliGRAM(s) Oral daily  tamsulosin 0.4 milliGRAM(s) Oral at bedtime      Allergies    ACE inhibitors (Hives)  carvedilol (Other)  enalapril (Hives)  Entresto (Other)    Intolerances        Vital Signs Last 24 Hrs  T(C): 36.6 (29 Sep 2020 17:35), Max: 37.3 (29 Sep 2020 13:37)  T(F): 97.8 (29 Sep 2020 17:35), Max: 99.2 (29 Sep 2020 13:37)  HR: 77 (29 Sep 2020 17:35) (74 - 90)  BP: 109/62 (29 Sep 2020 17:35) (103/63 - 113/66)  BP(mean): --  RR: 20 (29 Sep 2020 17:35) (17 - 20)  SpO2: 96% (29 Sep 2020 17:35) (96% - 100%)    09-28 @ 07:01 - 09-29 @ 07:00  --------------------------------------------------------  IN: 900 mL / OUT: 735 mL / NET: 165 mL    09-29 @ 07:01 - 09-29 @ 20:38  --------------------------------------------------------  IN: 250 mL / OUT: 1060 mL / NET: -810 mL          Review of Systems:   •	General: negative  •	Skin/Breast: negative  •	Ophthalmologic: negative  •	ENMT: negative  •	Respiratory and Thorax: negative  •	Cardiovascular: negative  •	Gastrointestinal: negative  •	Genitourinary: negative  •	Musculoskeletal: negative  •	Neurological: negative  •	Psychiatric: negative  •	Hematology/Lymphatics: negative  •	Endocrine: negative  •	Allergic/Immunologic: negative    Physical Exam:   • Constitutional:	Well-developed, well nourished  • Eyes:	EOMI; PERRL; no drainage or redness  • ENMT:	No oral lesions; no gross abnormalities  • Neck	No bruits; no thyromegaly or nodules  • Breasts:	not examined  • Back:	No deformity or limitation of movement  • Respiratory:	Breath Sounds equal & clear to percussion & auscultation, no accessory muscle use  • Cardiovascular:	Regular rate & rhythm, normal S1, S2; no murmurs, gallops or rubs; no S3, S4  • Gastrointestinal:	Soft, non-tender, no hepatosplenomegaly, normal bowel sounds  • Genitourinary:	not examined  • Rectal: not examined  • Extremities:	No cyanosis, clubbing or edema  • Vascular:	Equal and normal pulses (carotid, femoral, dorsalis pedis)  • Neurologica:l	not examined  • Skin:	No lesions; no rash  • Lymph Nodes:	No lymphadedenopathy  • Musculoskeletal:	No joint pain, swelling or deformity; no limitation of movement        LABS:      CBC Full  -  ( 28 Sep 2020 05:36 )  WBC Count : 8.84 K/uL  RBC Count : 4.29 M/uL  Hemoglobin : 9.4 g/dL  Hematocrit : 32.9 %  Platelet Count - Automated : 481 K/uL  Mean Cell Volume : 76.7 fl  Mean Cell Hemoglobin : 21.9 pg  Mean Cell Hemoglobin Concentration : 28.6 gm/dL  Auto Neutrophil # : x  Auto Lymphocyte # : x  Auto Monocyte # : x  Auto Eosinophil # : x  Auto Basophil # : x  Auto Neutrophil % : x  Auto Lymphocyte % : x  Auto Monocyte % : x  Auto Eosinophil % : x  Auto Basophil % : x    09-28    134<L>  |  101  |  16  ----------------------------<  92  4.5   |  22  |  0.82    Ca    8.4      28 Sep 2020 05:36    TPro  6.1  /  Alb  2.1<L>  /  TBili  0.5  /  DBili  x   /  AST  33  /  ALT  24  /  AlkPhos  133<H>  09-28                        RADIOLOGY & ADDITIONAL STUDIES (The following images were personally reviewed):  Loja:                                     No  Urine output:                       adequate  DVT prophylaxis:                 Yes  Flattus:                                  Yes  Bowel movement:              No

## 2020-09-29 NOTE — PROGRESS NOTE ADULT - ATTENDING COMMENTS
Patient seen and examined with house-staff during bedside rounds.  Resident note read, including vitals, physical findings, laboratory data, and radiological reports.   Revisions included below.  Direct personal management at bed side and extensive interpretation of the data.  Plan was outlined and discussed in details with the housestaff.  Decision making of high complexity  Action taken for acute disease activity to reflect the level of care provided:  - medication reconciliation  - review laboratory data  on lasix

## 2020-09-29 NOTE — PROGRESS NOTE ADULT - SUBJECTIVE AND OBJECTIVE BOX
Orthopaedic Surgery Progress Note    65M s/p right knee revision antibiotic spacer and gastroc flap/skin graft     Subjective:     Patient seen and examined. Patient complaining of feeling short of breath. States he has more pain today than he did over the weekend.     Objective:    Vital Signs Last 24 Hrs  T(C): 36.9 (09-29-20 @ 08:35), Max: 36.9 (09-29-20 @ 08:35)  T(F): 98.5 (09-29-20 @ 08:35), Max: 98.5 (09-29-20 @ 08:35)  HR: 90 (09-29-20 @ 08:35) (90 - 90)  BP: 105/64 (09-29-20 @ 08:35) (105/64 - 105/64)  BP(mean): --  RR: 18 (09-29-20 @ 08:35) (18 - 18)  SpO2: 100% (09-29-20 @ 08:35) (100% - 100%)  AVSS    PE:  General: Patient alert and oriented, NAD  Dressing: Clean/dry/intact right lower extremity ace wrap/seamus brace, left leg abd  pad/tape   moving all four extremities  lying comfortably in bed  firing TA/GS bilateral lower extremities                           9.4    8.84  )-----------( 481      ( 28 Sep 2020 05:36 )             32.9   28 Sep 2020 05:36    134    |  101    |  16     ----------------------------<  92     4.5     |  22     |  0.82       TPro  6.1    /  Alb  2.1    /  TBili  0.5    /  DBili  x      /  AST  33     /  ALT  24     /  AlkPhos  133    28 Sep 2020 05:36      A/P: 65M s/p right knee revision antibiotic spacer and gastroc flap/skin gr  1. Pain control as needed as per pain management - seen by pain management this AM to address patient's recent increased pain   2. Appreciate plastics recommendations - per plastic surgery, ok for discharge from a plastic surgery standpoint  3.Appreciate recommendations infectious disease Dr. Casillas, Dr. Kwok pulmonology   4. per dr. gonzalez, patient seeming more depressed lately - will re-consult psychology - appreciate recommendations  5. Shortness of breath - discussed with Dr. Caldera (cardiology), per Dr. Caldera, fluids discontinued, furosemide 20mg ordered, BNP ordered - f/u BNP  6. Patient prefers d/c to acute rehab facility       Ortho Pager [phone]

## 2020-09-29 NOTE — PROGRESS NOTE ADULT - ASSESSMENT
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  He has completed 6 weeks of vancomycin & rifampin.  He is s/p R hip exchange of hardware on 9/4 - OR cultures NGTD.  I&D of abd wall done 9/4 - cultures growing Pseudomonas and Enterococcus faecium. Gallium SPECT done 9/10 showed minimal uptake from GB fossa to soft tissue and was interpreted by surgery as a negative study. S/p R knee spacer exchange, muscle flap and L shin ulcer debridement on 9/18.      Recommendations:  - f/u RUQ/body fluid cultures (9/24) NGTD  - body fluid/RUQ cultures (9/22): NGTD  - fluid cx (9/5 & 9/4): Pseudomonas & VRE faecium  - c/w rifampin 300 mg po q12h  - c/w vancomycin 1.25 g q 24 hrs for 2 more days, and will adjust dose based on trough   - tissue cx (9/18): NGTD    ID team 1 will follow   66 yo M with HTN, hyperlipidemia, type II DM with peripheral neuropathy, CAD s/p MIDCAB (2018)/VERONICA, HFrEF, AICD (2/20), pulmonary HTN, chronic cholecystitis with recurrent R knee PPJI - MRSA, likely persistent from 11/2019.  He has completed 6 weeks of vancomycin & rifampin.  He is s/p R hip exchange of hardware on 9/4 - OR cultures NGTD.  I&D of abd wall done 9/4 - cultures growing Pseudomonas and Enterococcus faecium. Gallium SPECT done 9/10 showed minimal uptake from GB fossa to soft tissue and was interpreted by surgery as a negative study. S/p R knee spacer exchange, muscle flap and L shin ulcer debridement on 9/18.      Recommendations:    - fluid cx (9/5 & 9/4): Pseudomonas & VRE faecium  - c/w rifampin 300 mg po q12h  - c/w vancomycin 1.25 g q 24 hrs for 2 more days, and will adjust dose based on trough   - tissue cx (9/18): NGTD    ID team 1 will follow

## 2020-09-30 LAB
GLUCOSE BLDC GLUCOMTR-MCNC: 183 MG/DL — HIGH (ref 70–99)
GLUCOSE BLDC GLUCOMTR-MCNC: 192 MG/DL — HIGH (ref 70–99)
GLUCOSE BLDC GLUCOMTR-MCNC: 198 MG/DL — HIGH (ref 70–99)
GLUCOSE BLDC GLUCOMTR-MCNC: 200 MG/DL — HIGH (ref 70–99)
GLUCOSE BLDC GLUCOMTR-MCNC: 213 MG/DL — HIGH (ref 70–99)

## 2020-09-30 PROCEDURE — 99232 SBSQ HOSP IP/OBS MODERATE 35: CPT | Mod: GC

## 2020-09-30 PROCEDURE — 99233 SBSQ HOSP IP/OBS HIGH 50: CPT

## 2020-09-30 RX ORDER — PETROLATUM,WHITE
1 JELLY (GRAM) TOPICAL
Refills: 0 | Status: DISCONTINUED | OUTPATIENT
Start: 2020-09-30 | End: 2020-10-03

## 2020-09-30 RX ADMIN — MORPHINE SULFATE 4 MILLIGRAM(S): 50 CAPSULE, EXTENDED RELEASE ORAL at 17:10

## 2020-09-30 RX ADMIN — NYSTATIN CREAM 1 APPLICATION(S): 100000 CREAM TOPICAL at 07:02

## 2020-09-30 RX ADMIN — MORPHINE SULFATE 30 MILLIGRAM(S): 50 CAPSULE, EXTENDED RELEASE ORAL at 01:54

## 2020-09-30 RX ADMIN — SERTRALINE 25 MILLIGRAM(S): 25 TABLET, FILM COATED ORAL at 13:19

## 2020-09-30 RX ADMIN — Medication 2: at 13:19

## 2020-09-30 RX ADMIN — Medication 166.67 MILLIGRAM(S): at 17:06

## 2020-09-30 RX ADMIN — CHLORHEXIDINE GLUCONATE 1 APPLICATION(S): 213 SOLUTION TOPICAL at 09:22

## 2020-09-30 RX ADMIN — ROSUVASTATIN CALCIUM 10 MILLIGRAM(S): 5 TABLET ORAL at 22:27

## 2020-09-30 RX ADMIN — GABAPENTIN 300 MILLIGRAM(S): 400 CAPSULE ORAL at 07:01

## 2020-09-30 RX ADMIN — SPIRONOLACTONE 25 MILLIGRAM(S): 25 TABLET, FILM COATED ORAL at 07:01

## 2020-09-30 RX ADMIN — MORPHINE SULFATE 30 MILLIGRAM(S): 50 CAPSULE, EXTENDED RELEASE ORAL at 07:53

## 2020-09-30 RX ADMIN — MORPHINE SULFATE 30 MILLIGRAM(S): 50 CAPSULE, EXTENDED RELEASE ORAL at 14:15

## 2020-09-30 RX ADMIN — Medication 81 MILLIGRAM(S): at 19:18

## 2020-09-30 RX ADMIN — Medication 1 APPLICATION(S): at 22:28

## 2020-09-30 RX ADMIN — Medication 81 MILLIGRAM(S): at 07:01

## 2020-09-30 RX ADMIN — INSULIN GLARGINE 20 UNIT(S): 100 INJECTION, SOLUTION SUBCUTANEOUS at 22:27

## 2020-09-30 RX ADMIN — Medication 2: at 08:10

## 2020-09-30 RX ADMIN — Medication 25 MILLIGRAM(S): at 07:01

## 2020-09-30 RX ADMIN — MORPHINE SULFATE 30 MILLIGRAM(S): 50 CAPSULE, EXTENDED RELEASE ORAL at 07:01

## 2020-09-30 RX ADMIN — MORPHINE SULFATE 4 MILLIGRAM(S): 50 CAPSULE, EXTENDED RELEASE ORAL at 09:22

## 2020-09-30 RX ADMIN — Medication 1 APPLICATION(S): at 13:19

## 2020-09-30 RX ADMIN — MORPHINE SULFATE 4 MILLIGRAM(S): 50 CAPSULE, EXTENDED RELEASE ORAL at 02:49

## 2020-09-30 RX ADMIN — MORPHINE SULFATE 30 MILLIGRAM(S): 50 CAPSULE, EXTENDED RELEASE ORAL at 13:28

## 2020-09-30 RX ADMIN — GABAPENTIN 300 MILLIGRAM(S): 400 CAPSULE ORAL at 22:27

## 2020-09-30 RX ADMIN — MORPHINE SULFATE 4 MILLIGRAM(S): 50 CAPSULE, EXTENDED RELEASE ORAL at 03:30

## 2020-09-30 RX ADMIN — NYSTATIN CREAM 1 APPLICATION(S): 100000 CREAM TOPICAL at 19:19

## 2020-09-30 RX ADMIN — MORPHINE SULFATE 4 MILLIGRAM(S): 50 CAPSULE, EXTENDED RELEASE ORAL at 18:10

## 2020-09-30 RX ADMIN — MORPHINE SULFATE 30 MILLIGRAM(S): 50 CAPSULE, EXTENDED RELEASE ORAL at 02:49

## 2020-09-30 RX ADMIN — Medication 2: at 22:27

## 2020-09-30 RX ADMIN — PRASUGREL 10 MILLIGRAM(S): 5 TABLET, FILM COATED ORAL at 13:19

## 2020-09-30 RX ADMIN — Medication 2: at 18:40

## 2020-09-30 RX ADMIN — GABAPENTIN 300 MILLIGRAM(S): 400 CAPSULE ORAL at 13:19

## 2020-09-30 RX ADMIN — MORPHINE SULFATE 4 MILLIGRAM(S): 50 CAPSULE, EXTENDED RELEASE ORAL at 10:00

## 2020-09-30 RX ADMIN — Medication 0.12 MILLIGRAM(S): at 07:01

## 2020-09-30 NOTE — OCCUPATIONAL THERAPY INITIAL EVALUATION ADULT - ADDITIONAL COMMENTS
Unable to obtain full details of previous DME use, as pt distraught over discharge plans and unable to fully answer questions. As per chart, pt had been using AD and DME with some HHA assistance.

## 2020-09-30 NOTE — OCCUPATIONAL THERAPY INITIAL EVALUATION ADULT - FINE MOTOR COORDINATION, RIGHT HAND THUMB/FINGER OPPOSITION SKILLS, OT EVAL
moderate impairment/Required increased time and decreased speed, however still unable to obtain full opposition

## 2020-09-30 NOTE — OCCUPATIONAL THERAPY INITIAL EVALUATION ADULT - FINE MOTOR COORDINATION, LEFT HAND THUMB/FINGER OPPOSITION SKILLS, OT EVAL
Required increased time and decreased speed, however still unable to obtain full opposition/moderate impairment

## 2020-09-30 NOTE — PROGRESS NOTE ADULT - SUBJECTIVE AND OBJECTIVE BOX
POST OPERATIVE DAY #  SUBJECTIVE: Patient seen and examined.  Pt without complaints.   Denies chest pain/SOB/dizziness/n/v/HA   Pain well controlled.       OBJECTIVE:     Vital Signs Last 24 Hrs  T(C): 36.9 (30 Sep 2020 10:44), Max: 36.9 (30 Sep 2020 05:33)  T(F): 98.4 (30 Sep 2020 10:44), Max: 98.5 (30 Sep 2020 05:33)  HR: 81 (30 Sep 2020 11:08) (77 - 86)  BP: 98/52 (30 Sep 2020 11:08) (94/51 - 117/69)  BP(mean): --  RR: 16 (30 Sep 2020 10:44) (16 - 20)  SpO2: 94% (30 Sep 2020 10:44) (93% - 97%)    PE: Comfortable. NAD. Alert, oriented.          Dressing: clean/dry/intact BLE, STSG dressings clean and dry          Sensation: intact to light touch to patient's baseline BLE          Motor exam:  firing TA/GS BLE,  wiggles toes          Skin warm, well-perfused; capillary refill brisk             ASSESSMENT AND PLAN: 65M s/p right knee revision and antibiotic spacer placement with gastroc flap, LLE STSG now on IV abx, awaiting placement to rehab  1. Evaluated by Psych today for depression/teariness- will follow up recs.   Appreciate Plastics recs (OK for discharge from their standpoint)   Appreciate ID recs (continue Vanco, trough ordered for tomorrow),   Appreciate Med & Cardio (Dr. Kwok, Dr. Caldera) recs- no evidence of decompensated CHF- fluids discontinued for SOB, lasix given yesterday, feeling better today. Labs reviewed.   2. Analgesic pain control  3. DVT prophylaxis: SCDs  4. Weight Bearing Status:  WBAT   5. Disposition: [facility], EFRAIN working with patient to get choices and apply/auth

## 2020-09-30 NOTE — OCCUPATIONAL THERAPY INITIAL EVALUATION ADULT - MANUAL MUSCLE TESTING RESULTS, REHAB EVAL
b/l shoulders, elbows and wrists >4/5,  3+/5 with notable atrophy in thenar, hyperthenar and anatomical snuff box

## 2020-09-30 NOTE — PROGRESS NOTE ADULT - ASSESSMENT
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  He has completed 6 weeks of vancomycin & rifampin.  He is s/p R hip exchange of hardware on 9/4 - OR cultures NGTD.  I&D of abd wall done 9/4 - cultures growing Pseudomonas and Enterococcus faecium. Gallium SPECT done 9/10 showed minimal uptake from GB fossa to soft tissue and was interpreted by surgery as a negative study. S/p R knee spacer exchange, muscle flap and L shin ulcer debridement on 9/18.      Recommendations:    - fluid cx (9/5 & 9/4): Pseudomonas & VRE faecium  - c/w rifampin 300 mg po q12h  - c/w vancomycin 1.25 g q 24 hrs. Please get CBC and CMP for tomorrow. along with vanc trough at 4 pm on 10/1 prior to 4th dose  - tissue cx (9/18): NGTD    ID team 1 will follow

## 2020-09-30 NOTE — OCCUPATIONAL THERAPY INITIAL EVALUATION ADULT - STANDING BALANCE: DYNAMIC, REHAB EVAL
poor balance/pt attempting to take 1 side step at EOB with modAx and RW. Pt able to stand for 1 minute with crouched posture.

## 2020-09-30 NOTE — PROVIDER CONTACT NOTE (OTHER) - DATE AND TIME:
02-Sep-2020 20:20
02-Sep-2020 23:30
03-Sep-2020 00:35
03-Sep-2020 06:00
03-Sep-2020 06:15
06-Sep-2020 05:58
14-Sep-2020 22:08
15-Sep-2020 20:15
16-Sep-2020 05:50
20-Sep-2020 03:28
20-Sep-2020 21:10
21-Sep-2020 00:55
21-Sep-2020 02:40
30-Sep-2020 05:34
09-Sep-2020 07:45
23-Sep-2020 23:24
25-Sep-2020 21:25
26-Sep-2020 17:03
27-Sep-2020 13:15

## 2020-09-30 NOTE — PROGRESS NOTE ADULT - SUBJECTIVE AND OBJECTIVE BOX
Pt comfortable. Pain controlled.   No acute events overnight.   Still somewhat depressed.     Exam:  AVSS. NAD.   Surgical incision clean/dry/intact.   Skin graft donor sites clean, healing well.   Right knee with healthy muscle flap and overlying skin graft.   Left leg skin graft with good take. 1cm x 1cm area without adherance.   No evidence of fluctuance in right posterior leg.   Right posterior leg MIS drain 130mL/24 hours.

## 2020-09-30 NOTE — OCCUPATIONAL THERAPY INITIAL EVALUATION ADULT - PERTINENT HX OF CURRENT PROBLEM, REHAB EVAL
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 He is s/p R hip exchange of hardware on 9/4 - OR cultures NGTD.  I&D of abd wall done 9/4 - cultures growing Pseudomonas and Enterococcus faecium. S/p R knee spacer exchange, muscle flap and L shin ulcer debridement on 9/18.

## 2020-09-30 NOTE — PROGRESS NOTE ADULT - SUBJECTIVE AND OBJECTIVE BOX
SUBJECTIVE: Patient seen and examined. Pt still complaining of pain in legs and inability to progress much with PT. Denies any other complaints. Somewhat sad affect this AM. Denies any sob/cp/n/v.     OBJECTIVE:     Vital Signs Last 24 Hrs  T(C): 36.9 (30 Sep 2020 05:33), Max: 37.3 (29 Sep 2020 13:37)  T(F): 98.5 (30 Sep 2020 05:33), Max: 99.2 (29 Sep 2020 13:37)  HR: 85 (30 Sep 2020 06:50) (74 - 90)  BP: 113/61 (30 Sep 2020 06:50) (94/51 - 117/69)  BP(mean): --  RR: 18 (30 Sep 2020 06:50) (17 - 20)  SpO2: 95% (30 Sep 2020 06:50) (93% - 100%)    RLE: open to air   DSG: Pulses: +DP;  feet cool to touch; at baseline; no edema  Sensation: Sensation intact to baseline (diminished sensation- h/o severe neuropathy; follows with vascular Dr. Malloy outpatient)  Motor: 5/5 EHL/FHL; limiting dorsi and plantar flexion on R as per plastics    LLE:  DSG: bacitracin to graft, adaptic, ABD pads and tape  Pulses: feet cool to touch; at baseline; no edema  Sensation: SILT to baseline (diminished sensation- h/o severe neuropathy; follows with vascular Dr. Malloy outpatient)  Motor: 5/5 EHL/FHL/TA/GS             ASSESSMENT AND PLAN: 64yo Male s/p Revision right knee abx spacer by Dr. Castro, STSG by Dr. Mckeon    1. Analgesic pain control  2. DVT prophylaxis: effient/asa  3. Weight Bearing Status: TTWB right le in seamus, plastics ok with weight bearing    4. Disposition: rehab  5. Plastics dressing with bacitracin, Adaptic, and ABD pads  6. CBC AND CMP DAILY; ESR/CRP Q48Hr  7. Hyponatremia 134 been stable, no fluid restriction per cardio  8. H/H stable  9. Plastics recs  Bilateral VAC dressings removed.   Wounds redressed with bacitracin to graft, adaptic, ABD pads and tape.  To be changed daily.  Please maintain knee immobilizer to RLE.  Ok for dispo from plastics perspective  10. ID recs appreciated currently on dapto, and rifampin  11. RUQ drainage- f/u cx ngtd  12. Appreciate podiatry recs-  Right foot found to have Rose grade 1 ulcers on 2nd and 3rd digits.  Applied Bactroban for ulcer sites on R & L foot 2nd digit  Dressed ulcer of L foot with cling/ DSD and R foot with a band-aid  13. Cardio recs- . He had a stent placed less than two years ago. continue prasugrel and aspirin.   Ventiricular tachycardia/Defibrillator- asymptomatic. Off monitor.   Hyponatremia- mild-agree with discontinuing fluid restriction  SOB with pleural effusion, giving lasix 20 IV when symptomatic

## 2020-09-30 NOTE — PROGRESS NOTE ADULT - ATTENDING COMMENTS
I have reviewed the medical record, including laboratory and radiographic studies, interviewed and examined the patient and discussed the plan with Dr. Elizabeth, the ID Resident.  Agree with above. Will continue to follow with you – he will transition with me to team 2 tomorrow.

## 2020-09-30 NOTE — PROGRESS NOTE ADULT - SUBJECTIVE AND OBJECTIVE BOX
INTERVAL HISTORY:  Orthopnea	  Chart, PMH medications and allergies reviewed    MEDICATIONS:  MEDICATIONS  (STANDING): you AQUAPHOR (petrolatum Ointment) 1 Application(s) Topical two times a day  aspirin enteric coated 81 milliGRAM(s) Oral two times a day  BACItracin   Ointment 1 Application(s) Topical daily  chlorhexidine 2% Cloths 1 Application(s) Topical <User Schedule>  dextrose 5%. 1000 milliLiter(s) (50 mL/Hr) IV Continuous <Continuous>  dextrose 5%. 1000 milliLiter(s) (50 mL/Hr) IV Continuous <Continuous>  dextrose 50% Injectable 12.5 Gram(s) IV Push once  dextrose 50% Injectable 25 Gram(s) IV Push once  dextrose 50% Injectable 25 Gram(s) IV Push once  digoxin     Tablet 0.125 milliGRAM(s) Oral daily  gabapentin 300 milliGRAM(s) Oral three times a day  insulin glargine Injectable (LANTUS) 20 Unit(s) SubCutaneous at bedtime  insulin lispro (HumaLOG) corrective regimen sliding scale   SubCutaneous Before meals and at bedtime  metoprolol succinate ER 25 milliGRAM(s) Oral daily  nystatin Cream 1 Application(s) Topical two times a day  polyethylene glycol 3350 17 Gram(s) Oral daily  prasugrel 10 milliGRAM(s) Oral daily  rifAMPin 300 milliGRAM(s) Oral every 12 hours  rosuvastatin 10 milliGRAM(s) Oral at bedtime  sertraline 25 milliGRAM(s) Oral daily  spironolactone 25 milliGRAM(s) Oral daily  tamsulosin 0.4 milliGRAM(s) Oral at bedtime  vancomycin  IVPB 1250 milliGRAM(s) IV Intermittent every 24 hours      REVIEW OF SYSTEMS:  CONSTITUTIONAL: No fever, no weight loss, no fatigue  PULMONARY: No cough, no wheezing, chills no hemoptysis; +Shortness of Breath  CARDIOVASCULAR: No chest pain, no palpitations, no syncope, dizziness, no leg swelling  GASTROINTESTINAL: No abdominal pain. No nausea, no  vomiting, no hematemesis; No diarrhea, no constipation. No melena, no hematochezia.  GENITOURINARY: No dysuria, no frequency, no hematuria, no incontinence  SKIN: No itching, no burning, no rashes, no lesions     PHYSICAL EXAM:  T(C): 37.1 (09-30-20 @ 14:21), Max: 37.1 (09-30-20 @ 14:21)  HR: 82 (09-30-20 @ 14:21) (77 - 86)  BP: 97/50 (09-30-20 @ 14:21) (94/51 - 117/69)  RR: 17 (09-30-20 @ 14:21) (16 - 20)  SpO2: 98% (09-30-20 @ 14:21) (93% - 98%)  Wt(kg): --  I&O's Summary    29 Sep 2020 07:01  -  30 Sep 2020 07:00  --------------------------------------------------------  IN: 250 mL / OUT: 2030 mL / NET: -1780 mL    30 Sep 2020 07:01  -  30 Sep 2020 16:01  --------------------------------------------------------  IN: 0 mL / OUT: 40 mL / NET: -40 mL        Appearance:  No deformities, normal appearance	  HEENT:   Normal oral mucosa, PERRL, EOMI	  Neck: No jvd , no masses  Lymphatic: No  lymphadenopathy  Pulmonary: Lungs clear to auscultation and percussion  Cardiovascular: Normal S1 S2, No JVD, No murmur, No edema	  Abdomen:  Soft, Non-tender, + BS  Skin: No rashes, No ecchymoses	  Extremities:  No clubbing or cyanosis  MSK: Normal range of motion, no joint swelling    LABS:		  CARDIAC MARKERS:         proBNP:  Lipid Profile:  HgA1c:  TSH:      TELEMETRY: 6 beat VT, A sense-V pace	      QTC     ECG:  	   QTC         RADIOLOGY:   DIAGNOSTIC TESTING: [ ] Echocardiogram: [ ]  Catheterization: [ ] Stress Test:      ASSESSMENT/PLAN: 	  Severely reduced ejection fraction-The patient had orthpnea.  Mild edema. Improved with furosemide and fluids held. Continue metoprolol digoxin and spironolactone.    Coronary artery disease-the patient denies chest discomfort.  continue prasugrel and aspirin.     Ventiricular tachycardia/Defibrillator- asymptomatic. Short run of VT.     Hyponatremia- mild-agree with discontinuing fluid restriction    Septic knee- as per ID and orthopedics.       Discussed with house staff.

## 2020-09-30 NOTE — PROVIDER CONTACT NOTE (OTHER) - NAME OF MD/NP/PA/DO NOTIFIED:
Elizabeth Dominguez MD
Elizabeth Dominguez MD
PACHECO Oscar
Roney Gonzalez MD
Roney Gonzalez MD via [facility] [phone]
Vanessa Lombardi MD
Carlos Glover MD
Carlos Sim MD and Hoa Kwok MD
Dr. Roney Gonzalez
NU Glover MD
PACHECO Justice

## 2020-09-30 NOTE — PROVIDER CONTACT NOTE (OTHER) - ASSESSMENT
/65 HR 84. Pt is ordered for Digoxin 0.125mg, Metoprolol 25mg XL, and Spironolactone 25mg at 6AM. Asked whether to hold or reschedule medications.
Blood bank dept called (on 9/15 after 2030pm) stating that their Director approved pt receiving 1 unit PRBC but to be given "tomorrow morning" or morning of Wednesday 9/16 since his OR procedure will be on Friday.
Pt complained of generalized itchiness. Pt asked for benadryl.
Pt complains of generalized itchiness after receiving multiple and different medications. See EMAR.
Pt refused AM lab draw. Pt requested lab draw for later time.
Pt refused continuous pulse oximetry. Pt stated "it gets in the way when I'm using my laptop". Offered to apply probe on different hand and finger or forehead but pt refused.
Pt refused continuous telemetry monitoring earlier and right now. Pt stated "I've been feeling nauseous. I just cleaned up my chest. When things calm down, I'll put it back on."
Pt stated "I feel short of breath. I think I need Lasix." BP 93/54 HR 86 RR 20 O2 sat 96% in room air.
Pt unintentionally removed his right single lumen PICC line. Pt stated he was "dreaming" and didn't realize that he removed his PICC line. Site appears dry.
Pt was due to void between 6-8PM. Bladder scan at 115AM was 877cc.
Pt's /61 HR 89. Metoprolol 25mg ER and spironolactone 25mg ordered for 6AM.
Pt's chest xray result for PICC line placement was available. Asked if PICC line may be accessed.
Pt's last BM was on 9/12 or 9/11. Pt stated last BM was on "Friday". Pt isn't ordered for laxatives.
Pt's right thigh Tegaderm dressing is pooling with serosanguinous drainage. MD from Plastics told NICANOR Olivares to reinforce dressing this AM.
Pt AOx4 with c/o nausea
Pt BP 92/49, HR 75. pt c/o abdominal pain and discomfort. pt appears lethargic and weak. second BP 98/53. HR 75.
AOx4, Pt. denies any S/S of hypoglycemia. Last FS 76
Denies SOB , CP or palpitations.

## 2020-09-30 NOTE — PROGRESS NOTE ADULT - SUBJECTIVE AND OBJECTIVE BOX
No acute events overnight    infectious diseases progress note:  FLOWER MARISCAL is a 65yMale patient    KNEE PAIN      Neurocognitive disorder    Adjustment disorder with mixed anxiety and depressed mood    Postoperative state    Preoperative clearance    Leg wound, left    Atherosclerosis of coronary artery    HLD (hyperlipidemia)    HTN (hypertension)    CHF (congestive heart failure)    Diabetes    Acute pain of right knee        ROS:  CONSTITUTIONAL:  Negative fever or chills, feels well, good appetite  EYES:  Negative  blurry vision or double vision  CARDIOVASCULAR:  Negative for chest pain or palpitations  RESPIRATORY:  Negative for cough, wheezing, or SOB   GASTROINTESTINAL:  Negative for nausea, vomiting, diarrhea, constipation, or abdominal pain  GENITOURINARY:  Negative frequency, urgency or dysuria  NEUROLOGIC:  No headache, confusion, dizziness, lightheadedness    Allergies    ACE inhibitors (Hives)  carvedilol (Other)  enalapril (Hives)  Entresto (Other)    Intolerances        ANTIBIOTICS/RELEVANT:  antimicrobials  rifAMPin 300 milliGRAM(s) Oral every 12 hours  vancomycin  IVPB 1250 milliGRAM(s) IV Intermittent every 24 hours    immunologic:    OTHER:  AQUAPHOR (petrolatum Ointment) 1 Application(s) Topical two times a day  aspirin enteric coated 81 milliGRAM(s) Oral two times a day  BACItracin   Ointment 1 Application(s) Topical daily  chlorhexidine 2% Cloths 1 Application(s) Topical <User Schedule>  cyclobenzaprine 10 milliGRAM(s) Oral three times a day PRN  dextrose 40% Gel 15 Gram(s) Oral once PRN  dextrose 40% Gel 15 Gram(s) Oral once PRN  dextrose 5%. 1000 milliLiter(s) IV Continuous <Continuous>  dextrose 5%. 1000 milliLiter(s) IV Continuous <Continuous>  dextrose 50% Injectable 12.5 Gram(s) IV Push once  dextrose 50% Injectable 25 Gram(s) IV Push once  dextrose 50% Injectable 25 Gram(s) IV Push once  digoxin     Tablet 0.125 milliGRAM(s) Oral daily  diphenhydrAMINE 50 milliGRAM(s) Oral every 4 hours PRN  gabapentin 300 milliGRAM(s) Oral three times a day  glucagon  Injectable 1 milliGRAM(s) IntraMuscular once PRN  glucagon  Injectable 1 milliGRAM(s) IntraMuscular once PRN  insulin glargine Injectable (LANTUS) 20 Unit(s) SubCutaneous at bedtime  insulin lispro (HumaLOG) corrective regimen sliding scale   SubCutaneous Before meals and at bedtime  metoclopramide Injectable 5 milliGRAM(s) IV Push every 8 hours PRN  metoprolol succinate ER 25 milliGRAM(s) Oral daily  morphine  - Injectable 4 milliGRAM(s) IV Push every 4 hours PRN  morphine  IR 15 milliGRAM(s) Oral every 4 hours PRN  morphine  IR 30 milliGRAM(s) Oral every 4 hours PRN  naloxone Injectable 0.1 milliGRAM(s) IV Push every 3 minutes PRN  nystatin Cream 1 Application(s) Topical two times a day  ondansetron Injectable 4 milliGRAM(s) IV Push every 6 hours PRN  polyethylene glycol 3350 17 Gram(s) Oral daily  prasugrel 10 milliGRAM(s) Oral daily  rosuvastatin 10 milliGRAM(s) Oral at bedtime  senna 2 Tablet(s) Oral at bedtime PRN  sertraline 25 milliGRAM(s) Oral daily  simethicone 80 milliGRAM(s) Chew three times a day PRN  sodium chloride 0.9% lock flush 10 milliLiter(s) IV Push every 1 hour PRN  spironolactone 25 milliGRAM(s) Oral daily  tamsulosin 0.4 milliGRAM(s) Oral at bedtime      Objective:  Vital Signs Last 24 Hrs  T(C): 37.1 (30 Sep 2020 14:21), Max: 37.1 (30 Sep 2020 14:21)  T(F): 98.7 (30 Sep 2020 14:21), Max: 98.7 (30 Sep 2020 14:21)  HR: 82 (30 Sep 2020 14:21) (77 - 86)  BP: 97/50 (30 Sep 2020 14:21) (94/51 - 117/69)  BP(mean): --  RR: 17 (30 Sep 2020 14:21) (16 - 20)  SpO2: 98% (30 Sep 2020 14:21) (93% - 98%)    PHYSICAL EXAM:  Constitutional:Well-developed, well nourishe  Eyes:DAMARIS, EOMI  Ear/Nose/Throat: no oral lesion, no sinus tenderness on percussion	  Neck:no JVD, no lymphadenopathy, supple  Respiratory: CTAb B/L  Cardiovascular: S1S2 RRR, no murmurs  Gastrointestinal:soft, (+) BS, no HSM  Extremities:no e/e/c  e/e/c        LABS:                  MICROBIOLOGY:        RADIOLOGY & ADDITIONAL STUDIES:

## 2020-09-30 NOTE — PROGRESS NOTE ADULT - ASSESSMENT
A/P: Pt doing well s/p muscle flap closure.   1. Skin graft donor site dressings: tegaderm only.   Right knee and Left leg skin graft sites cover with bacitracin, adaptec, ABD pads and kerlix gauze.   Right leg should be gently wrapped with ace wrap at all times.     2. Maintain MIS until less than 20mL/24 hours. Will likely be discharged with this. Still high output.   3. Dispo pending   4. Ok to be discharged from PRS standpoint  5. TTWB right side. Full WB left site.

## 2020-09-30 NOTE — OCCUPATIONAL THERAPY INITIAL EVALUATION ADULT - GENERAL OBSERVATIONS, REHAB EVAL
Pt cleared for OT by NICANOR Cortez. Pt received semi-richey, NAD, +R seamus locked in extension, +tele, +MIS, +L shin dressing C/D/I

## 2020-09-30 NOTE — OCCUPATIONAL THERAPY INITIAL EVALUATION ADULT - PLANNED THERAPY INTERVENTIONS, OT EVAL
balance training/ROM/ADL retraining/bed mobility training/fine motor coordination training/transfer training/IADL retraining/strengthening

## 2020-09-30 NOTE — PROVIDER CONTACT NOTE (OTHER) - ACTION/TREATMENT ORDERED:
Benadryl 50mg po ordered and given.
Benadryl 50mg po prn q4hrs ordered and given.
Dr. Gonzalez came to see pt. Instructed pt to be connected to telemetry monitoring and was reconnected. Lasix was not ordered.
Dr. Gonzalez ordered to hold metoprolol 25mg ER and to give spironolactone 25mg po. Metoprolol 25mg ER rescheduled to 10AM in the mean time.
Dr. Lombardi stated to give each medication 1 hour to 1.5 hour apart. Metoprolol 25mg XL was given first.
Lab draw rescheduled for 10AM.
MD Carlos ordered pt's PICC line may be accessed.
MD Harjit stated dressing can be removed,  to dab excess drainage with gauze and apply Tegaderm. Dressing changed as per order.
No intervention at this time. Will continue to monitor pt's O2 sat with portable pulse oximeter.
Peripheral IV insertion is being attempted.
Reglan 10mg IVP given in the mean time for nausea. Will attempt to reconnect pt to telemetry at a later time.
Senna 2 tabs and dulcolax 5mg po ordered for bedtime. Milk of magnesia 30ml ordered prn for constipation.
Straight catheterization ordered but when pt was going to be catheterized, pt had already voided 400cc.
Will reportedly re examine px
MD made aware. ordered to hold pain meds, hold toprol XL, and administer Digoxin. MD also ordered 500cc bolus. bolus ordered and given. will continue to monitor.
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
Provider notified. No interventions at this time for BP. Turned and repositioned. Allyvan applied to buttock.
Pt refused NPO, wants MD to come and see him. MD aware

## 2020-09-30 NOTE — PROGRESS NOTE ADULT - SUBJECTIVE AND OBJECTIVE BOX
Pain Management Progress Note - [facility] [phone]      HPI: Patient seen and examined today. Patient with a history of knee pain, diabetic neuropathy, CHF, HTN, MRSA bacteremia, diverticulitis, COPD, hyperkalemia, chronic systolic CHF, osteoarthritis, s/p R Revision TKA and antibiotic spacer by Dr. Castro on 7/22, now presenting with R knee pain and swelling x3 days, now s/p R knee spacer exchange, gastro flap with skin graft.  Patient reports R knee pain, LLE pain, patient reports moderate pain relief with  current pain medication regimen. Patient Axox3, denies any itchiness from Morphine Po. reviewed pain medication regimen with patient at bedside.           Pain is _x__ sharp ____dull ___burning _x__achy ___ Intensity: ____ mild _x__mod x__severe     Location _x___surgical site ____cervical _____lumbar ____abd ____upper ext__x__lower ext    Worse with _x___activity _x___movement _____physical therapy___ Rest    Improved with _x___medication __x__rest ____physical therapy      AQUAPHOR (petrolatum Ointment)  furosemide   Injectable  morphine  - Injectable  morphine  - Injectable  vancomycin  IVPB  lactated ringers.  ketorolac   Injectable  metoclopramide Injectable  simethicone  dextrose 5%.  dextrose 40% Gel  glucagon  Injectable  mupirocin 2% Ointment  morphine  IR  morphine  IR  BACItracin   Ointment  mupirocin 2% Ointment  ketorolac   Injectable  gabapentin  morphine  - Injectable  ketorolac   Injectable  potassium phosphate / sodium phosphate Powder (PHOS-NaK)  insulin glargine Injectable (LANTUS)  chlorhexidine 2% Cloths  sodium chloride 0.9% lock flush  gabapentin  gabapentin  sertraline  rosuvastatin  senna  polyethylene glycol 3350  nystatin Cream  potassium phosphate / sodium phosphate Powder (PHOS-NaK)  insulin glargine Injectable (LANTUS)  insulin lispro Injectable (HumaLOG)  cyclobenzaprine  aspirin enteric coated  diphenhydrAMINE  tamsulosin  HYDROmorphone  Injectable  digoxin     Tablet  HYDROmorphone PCA (1 mG/mL)  prasugrel  aspirin enteric coated  ondansetron Injectable  naloxone Injectable  HYDROmorphone PCA (1 mG/mL)  spironolactone  digoxin     Tablet  glucagon  Injectable  dextrose 50% Injectable  dextrose 50% Injectable  dextrose 50% Injectable  dextrose 40% Gel  dextrose 5%.  insulin lispro (HumaLOG) corrective regimen sliding scale  magnesium sulfate  IVPB  metoprolol succinate ER  cefepime   IVPB  rifAMPin  DAPTOmycin IVPB  morphine  - Injectable  gabapentin  cyclobenzaprine  morphine  IR  morphine  IR  lactated ringers.  HYDROmorphone  Injectable  morphine  IR  morphine  IR  morphine  - Injectable  lactated ringers.  mupirocin 2% Ointment  nystatin Cream  morphine  IR  morphine  IR  nystatin Powder  morphine  - Injectable  chlorhexidine 2% Cloths  insulin glargine Injectable (LANTUS)  insulin glargine Injectable (LANTUS)  insulin glargine Injectable (LANTUS)  pantoprazole  Injectable  metoclopramide Injectable  acetaminophen   Tablet ..  diphenhydrAMINE   Injectable  lactated ringers.  levothyroxine  sodium chloride 0.9% Bolus  gabapentin  gabapentin  gabapentin  sertraline  insulin glargine Injectable (LANTUS)  morphine  IR  morphine  IR  morphine  - Injectable  HYDROmorphone   Tablet  HYDROmorphone   Tablet  (ADM OVERRIDE)  insulin lispro Injectable (HumaLOG)  diphenhydrAMINE  insulin glargine Injectable (LANTUS)      ROS: Const:  -___febrile   Eyes:___ENT:___CV: __-_chest pain  Resp: __-__sob  GI:_-__nausea __-_vomiting _-_ abd pain ___npo ___clears _x_full diet __bm  :___ Musk: _x__pain _-__spasm  Skin:___ Neuro:  _-__iwupovox_-__kvubuonnt_-__ numbness _-__weakness __x_paresth  Psych:_-_anxiety  Endo:___ Heme:___Allergy:_________, _x__all others reviewed and negative      PAST MEDICAL & SURGICAL HISTORY:  Diabetic neuropathy  STEMI (ST elevation myocardial infarction)  Diverticulitis  MRSA bacteremia  History of celiac disease  CHF (congestive heart failure): EF ~ 25%  HTN (hypertension)  Diabetes: on insulin pump  Blood clot due to device, implant, or graft: was on blood thinners  HLD (hyperlipidemia)  Osteoarthritis  Atherosclerosis of coronary artery: CAD (coronary artery disease)  Status post percutaneous transluminal coronary angioplasty: in 2012  History of open reduction and internal fixation (ORIF) procedure  Surgery, elective: Right shoulder  Surgery, elective: right knee wound debridement  S/P TKR (total knee replacement), right: with infection Mrsa   per pt he was cleared from MRSA infection  S/P CABG x 1: 2018  Stented coronary artery: 10/18 heart attack  INFERIOR WALL MI  Other postprocedural status: Fixation hardware in foot LEFT        T(C): 36.9 (09-30-20 @ 10:44), Max: 37.3 (09-29-20 @ 13:37)  HR: 81 (09-30-20 @ 11:08) (74 - 86)  BP: 98/52 (09-30-20 @ 11:08) (94/51 - 117/69)  RR: 16 (09-30-20 @ 10:44) (16 - 20)  SpO2: 94% (09-30-20 @ 10:44) (93% - 97%)  Wt(kg): --           PHYSICAL EXAM:  Gen Appearance: x___no acute distress _x__appropriate        Neuro: _x__SILT feet____ EOM Intact Psych: AAOX__3, x___mood/affect appropriate        Eyes: __x_conjunctiva WNL  __x__ Pupils equal and round        ENT: x___ears and nose atraumatic_x__ Hearing grossly intact        Neck: _x__trachea midline, no visible masses ___thyroid without palpable mass    Resp: _x__Nml WOB____No tactile fremitus ___clear to auscultation    Cardio: ___extremities free from edema __x__pedal pulses palpable    GI/Abdomen: __x_soft ___x__ Nontender___x___Nondistended_____HSM    Lymphatic: ___no palpable nodes in neck  ___no palpable nodes calves and feet    Skin/Wound: ___Incision, _x__Dressing c/d/i,   ____surrounding tissues soft,  ___drain/chest tube present____    Muscular: EHL _4__/5  Gastrocnemius_4__/5    ___absent clubbing/cyanosis          ASSESSMENT: This is a 65y old Male with a history of knee pain, diabetic neuropathy, CHF, HTN, MRSA bacteremia, diverticulitis, COPD, hyperkalemia, chronic systolic CHF, osteoarthritis,  s/p R Revision TKA and antibiotic spacer by Dr. Castro on 7/22, now presenting with R knee pain and swelling x3 days, now s/p R knee spacer exchange, gastro flap with skin graft, pain controlled with current pain medication regimen.      Recommended Treatment PLAN:  1. Morphine IR 30mg Po Q4h prn moderate to severe pain  2. Flexeril 5mg PO Q8h prn muscle spasms  3. Gabapentin 300mg PO TID  4. Morphine 4mg IVP Q4h prn breakthrough pain  Plan discussed with Dr. Max

## 2020-10-01 LAB
ALBUMIN SERPL ELPH-MCNC: 2.7 G/DL — LOW (ref 3.3–5)
ALP SERPL-CCNC: 136 U/L — HIGH (ref 40–120)
ALT FLD-CCNC: 16 U/L — SIGNIFICANT CHANGE UP (ref 10–45)
ANION GAP SERPL CALC-SCNC: 9 MMOL/L — SIGNIFICANT CHANGE UP (ref 5–17)
AST SERPL-CCNC: 16 U/L — SIGNIFICANT CHANGE UP (ref 10–40)
BILIRUB SERPL-MCNC: 0.4 MG/DL — SIGNIFICANT CHANGE UP (ref 0.2–1.2)
BUN SERPL-MCNC: 20 MG/DL — SIGNIFICANT CHANGE UP (ref 7–23)
CALCIUM SERPL-MCNC: 8 MG/DL — LOW (ref 8.4–10.5)
CHLORIDE SERPL-SCNC: 100 MMOL/L — SIGNIFICANT CHANGE UP (ref 96–108)
CO2 SERPL-SCNC: 28 MMOL/L — SIGNIFICANT CHANGE UP (ref 22–31)
CREAT SERPL-MCNC: 0.78 MG/DL — SIGNIFICANT CHANGE UP (ref 0.5–1.3)
CRP SERPL-MCNC: 4.4 MG/DL — HIGH (ref 0–0.4)
ERYTHROCYTE [SEDIMENTATION RATE] IN BLOOD: 45 MM/HR — HIGH
GLUCOSE BLDC GLUCOMTR-MCNC: 162 MG/DL — HIGH (ref 70–99)
GLUCOSE BLDC GLUCOMTR-MCNC: 170 MG/DL — HIGH (ref 70–99)
GLUCOSE BLDC GLUCOMTR-MCNC: 172 MG/DL — HIGH (ref 70–99)
GLUCOSE BLDC GLUCOMTR-MCNC: 188 MG/DL — HIGH (ref 70–99)
GLUCOSE SERPL-MCNC: 129 MG/DL — HIGH (ref 70–99)
HCT VFR BLD CALC: 28.6 % — LOW (ref 39–50)
HGB BLD-MCNC: 8.1 G/DL — LOW (ref 13–17)
MCHC RBC-ENTMCNC: 21.3 PG — LOW (ref 27–34)
MCHC RBC-ENTMCNC: 28.3 GM/DL — LOW (ref 32–36)
MCV RBC AUTO: 75.1 FL — LOW (ref 80–100)
NRBC # BLD: 0 /100 WBCS — SIGNIFICANT CHANGE UP (ref 0–0)
PLATELET # BLD AUTO: 565 K/UL — HIGH (ref 150–400)
POTASSIUM SERPL-MCNC: 4.3 MMOL/L — SIGNIFICANT CHANGE UP (ref 3.5–5.3)
POTASSIUM SERPL-SCNC: 4.3 MMOL/L — SIGNIFICANT CHANGE UP (ref 3.5–5.3)
PROT SERPL-MCNC: 6.4 G/DL — SIGNIFICANT CHANGE UP (ref 6–8.3)
RBC # BLD: 3.81 M/UL — LOW (ref 4.2–5.8)
RBC # FLD: 23.7 % — HIGH (ref 10.3–14.5)
SODIUM SERPL-SCNC: 137 MMOL/L — SIGNIFICANT CHANGE UP (ref 135–145)
VANCOMYCIN TROUGH SERPL-MCNC: 14 UG/ML — SIGNIFICANT CHANGE UP (ref 10–20)
WBC # BLD: 12.89 K/UL — HIGH (ref 3.8–10.5)
WBC # FLD AUTO: 12.89 K/UL — HIGH (ref 3.8–10.5)

## 2020-10-01 PROCEDURE — 99233 SBSQ HOSP IP/OBS HIGH 50: CPT | Mod: GC

## 2020-10-01 PROCEDURE — 99232 SBSQ HOSP IP/OBS MODERATE 35: CPT

## 2020-10-01 PROCEDURE — 93284 PRGRMG EVAL IMPLANTABLE DFB: CPT | Mod: 26

## 2020-10-01 RX ORDER — MORPHINE SULFATE 50 MG/1
15 CAPSULE, EXTENDED RELEASE ORAL DAILY
Refills: 0 | Status: DISCONTINUED | OUTPATIENT
Start: 2020-10-01 | End: 2020-10-03

## 2020-10-01 RX ADMIN — Medication 2: at 17:41

## 2020-10-01 RX ADMIN — Medication 81 MILLIGRAM(S): at 06:17

## 2020-10-01 RX ADMIN — MORPHINE SULFATE 4 MILLIGRAM(S): 50 CAPSULE, EXTENDED RELEASE ORAL at 15:11

## 2020-10-01 RX ADMIN — Medication 0.12 MILLIGRAM(S): at 06:17

## 2020-10-01 RX ADMIN — Medication 81 MILLIGRAM(S): at 17:41

## 2020-10-01 RX ADMIN — MORPHINE SULFATE 30 MILLIGRAM(S): 50 CAPSULE, EXTENDED RELEASE ORAL at 11:41

## 2020-10-01 RX ADMIN — MORPHINE SULFATE 4 MILLIGRAM(S): 50 CAPSULE, EXTENDED RELEASE ORAL at 14:11

## 2020-10-01 RX ADMIN — Medication 2: at 12:57

## 2020-10-01 RX ADMIN — Medication 166.67 MILLIGRAM(S): at 17:40

## 2020-10-01 RX ADMIN — NYSTATIN CREAM 1 APPLICATION(S): 100000 CREAM TOPICAL at 06:23

## 2020-10-01 RX ADMIN — Medication 2: at 22:08

## 2020-10-01 RX ADMIN — ROSUVASTATIN CALCIUM 10 MILLIGRAM(S): 5 TABLET ORAL at 22:09

## 2020-10-01 RX ADMIN — PRASUGREL 10 MILLIGRAM(S): 5 TABLET, FILM COATED ORAL at 14:11

## 2020-10-01 RX ADMIN — INSULIN GLARGINE 20 UNIT(S): 100 INJECTION, SOLUTION SUBCUTANEOUS at 22:09

## 2020-10-01 RX ADMIN — CHLORHEXIDINE GLUCONATE 1 APPLICATION(S): 213 SOLUTION TOPICAL at 06:21

## 2020-10-01 RX ADMIN — TAMSULOSIN HYDROCHLORIDE 0.4 MILLIGRAM(S): 0.4 CAPSULE ORAL at 22:09

## 2020-10-01 RX ADMIN — MORPHINE SULFATE 30 MILLIGRAM(S): 50 CAPSULE, EXTENDED RELEASE ORAL at 04:41

## 2020-10-01 RX ADMIN — SPIRONOLACTONE 25 MILLIGRAM(S): 25 TABLET, FILM COATED ORAL at 06:17

## 2020-10-01 RX ADMIN — SERTRALINE 25 MILLIGRAM(S): 25 TABLET, FILM COATED ORAL at 14:11

## 2020-10-01 RX ADMIN — Medication 25 MILLIGRAM(S): at 09:19

## 2020-10-01 RX ADMIN — GABAPENTIN 300 MILLIGRAM(S): 400 CAPSULE ORAL at 22:09

## 2020-10-01 RX ADMIN — Medication 1 APPLICATION(S): at 12:57

## 2020-10-01 RX ADMIN — MORPHINE SULFATE 30 MILLIGRAM(S): 50 CAPSULE, EXTENDED RELEASE ORAL at 10:41

## 2020-10-01 RX ADMIN — GABAPENTIN 300 MILLIGRAM(S): 400 CAPSULE ORAL at 14:10

## 2020-10-01 RX ADMIN — Medication 1 APPLICATION(S): at 06:20

## 2020-10-01 RX ADMIN — Medication 2: at 09:19

## 2020-10-01 RX ADMIN — Medication 1 APPLICATION(S): at 17:40

## 2020-10-01 RX ADMIN — MORPHINE SULFATE 4 MILLIGRAM(S): 50 CAPSULE, EXTENDED RELEASE ORAL at 06:13

## 2020-10-01 RX ADMIN — GABAPENTIN 300 MILLIGRAM(S): 400 CAPSULE ORAL at 06:17

## 2020-10-01 NOTE — PROGRESS NOTE ADULT - SUBJECTIVE AND OBJECTIVE BOX
INTERVAL HPI/OVERNIGHT EVENTS:  Ongoing pain at graft sites.  Knee not as painful.    CONSTITUTIONAL:  No fever, chills, night sweats  EYES:  No photophobia or visual changes  CARDIOVASCULAR:  No chest pain  RESPIRATORY:  No cough, wheezing, or SOB   GASTROINTESTINAL:  No nausea, vomiting, diarrhea, constipation, or abdominal pain  GENITOURINARY:  No frequency, urgency, dysuria or hematuria  NEUROLOGIC:  No headache, lightheadedness      ANTIBIOTICS/RELEVANT:          Vital Signs Last 24 Hrs  T(C): 36.8 (01 Oct 2020 16:24), Max: 37.1 (01 Oct 2020 08:49)  T(F): 98.3 (01 Oct 2020 16:24), Max: 98.7 (01 Oct 2020 08:49)  HR: 91 (01 Oct 2020 16:24) (77 - 91)  BP: 112/62 (01 Oct 2020 16:24) (95/55 - 112/62)  BP(mean): --  RR: 16 (01 Oct 2020 16:24) (16 - 20)  SpO2: 92% (01 Oct 2020 16:24) (92% - 98%)    PHYSICAL EXAM:  Constitutional:  Well-developed, well nourished  Eyes:  Sclerae anicteric, conjunctivae clear, PERRL  Ear/Nose/Throat:  No nasal exudate or sinus tenderness;  No buccal mucosal lesions, no pharyngeal erythema or exudate	  Neck:  Supple, no adenopathy  Chest:  L AICD pocket nontender, no erythema  Respiratory:  Clear bilaterally  Cardiovascular:  RRR, S1S2, no murmur appreciated  Gastrointestinal:  Symmetric, normoactive BS, soft, NT, no masses, guarding or rebound.  No HSM  Extremities:  B thigh donor sites with granular base, no surrounding erythema or exudate.  R knee with drain in ace wrap.  Grafted pretibial ulcer site without erythema/drainage.      LABS:                        8.1    12.89 )-----------( 565      ( 01 Oct 2020 16:54 )             28.6         10-01    137  |  100  |  20  ----------------------------<  129<H>  4.3   |  28  |  0.78    Ca    8.0<L>      01 Oct 2020 16:54    TPro  6.4  /  Alb  2.7<L>  /  TBili  0.4  /  DBili  x   /  AST  16  /  ALT  16  /  AlkPhos  136<H>  10-01    Vancomycin 14.0  (16:54; prior dose 9/30 at 17:06)      MICROBIOLOGY:        RADIOLOGY & ADDITIONAL STUDIES:

## 2020-10-01 NOTE — PROGRESS NOTE ADULT - SUBJECTIVE AND OBJECTIVE BOX
Medicine Progress Note    Patient is a 65y old  Male who presents with a chief complaint of R Knee Swelling (01 Oct 2020 09:52)    EPS Device interrogation    Indication: Routine device check    Device model: SJM Quadra Assura YANY					    Implanting Physician: MAYNOR    Functioning Mode: DDD 			    Underlying Rhythm: Sinus rhythm     Pacemaker dependency: No    Battery status: 4.7-5.3 years     Lead parameters:   				  RA lead:    Sense:  0.6 mV.              Threshold:   0.75 V at  0.5 ms.           Impedance:  400 ohms  RV lead:    Sense:   7.3 mV.              Threshold:   0.50 V at 0.3 ms.           Impedance:   340 ohms  LV lead:    Sense:    mV.              Threshold:    1.0  V at 1.0 ms.           Impedance:  340 ohms    Shock coil Impedance: 51    Events/Alert: None    Parameter change: None 	    Normal device function as programmed. The patient had one NSVT episode in early September that self-terminated. No sustained VT/VF. The patient was unaware. He is BIV paced 97% of the time. No AF/AT.       Will discuss with EPS attending.

## 2020-10-01 NOTE — PROGRESS NOTE ADULT - ASSESSMENT
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  He failed first 6 week course of vancomycin and rifampin, is now s/p re-insertion of antibiotic spacer on 9/18 with biopsy of R femur and skin grafts to R knee, as well as L pretibial ulcer.  His antibiotics had been changed to dapto, cefepime and rifampin for RUQ intradermal abscess that grew VRE faecium and Pseudomonas, now resolved.  He is back on vancomycin and rifampin.  Trough was done just a little late and was 14 - would consider therapeutic.  Per Dr. Castro, plan is for him to undergo cholecystectomy at some point during this course of antibiotics.  He will then re-evaluate for second stage.  Suggest:  - Continue vancomycin 1.25 g IV q24h - tentative end date 10/29  - Continue rifampin 300 mg po q12h - to continue as long as on vancomycin  - Weekly CBC, CMP, ESR, CRP, vancomycin trough - fax to me at [phone]  - I will arrange for f/u with me.  Recommendations discussed with primary team.  Please recall if further ID input is desired - team 2.

## 2020-10-01 NOTE — PROGRESS NOTE ADULT - ASSESSMENT
66 y/o M POD # 13 s/p placement of R knee antibiotic spacer, gastroc flap, STSG, L shin vac. Clinically stable. 66 y/o M POD # 13 s/p placement of R knee antibiotic spacer, gastroc flap, STSG, L shin vac. Clinically stable.     1. Skin graft donor site dressings: tegaderm only.   Right knee and Left leg skin graft sites cover with Aquaphor and adaptec, may place ABD pads and kerlix gauze or leave open to air.   Right leg should be gently wrapped with ace wrap at all times.     2. Maintain MIS until less than 20mL/24 hours. Will likely be discharged with this. Still high output.   3. TTWB right side. Full WB left site.   4.Ok to be discharged

## 2020-10-01 NOTE — PROGRESS NOTE ADULT - SUBJECTIVE AND OBJECTIVE BOX
S: 66 y/o M POD # 13 s/p placement of R knee antibiotic spacer, gastroc flap, STSG, L shin vac. States has mild pain of the knee and L shin controlled with pain medication. Denies numbness and tingling. No acute complaints. R knee drain 70cc in 24hrs    O: Vital Signs Last 24 Hrs  T(C): 36.6 (01 Oct 2020 04:41), Max: 37.1 (30 Sep 2020 14:21)  T(F): 97.9 (01 Oct 2020 04:41), Max: 98.7 (30 Sep 2020 14:21)  HR: 77 (01 Oct 2020 04:41) (77 - 88)  BP: 109/56 (01 Oct 2020 06:16) (97/50 - 109/56)  BP(mean): --  RR: 19 (01 Oct 2020 06:16) (16 - 20)  SpO2: 98% (01 Oct 2020 06:16) (94% - 98%)  IN:    IV PiggyBack: 250 mL  Total IN: 250 mL    OUT:    Bulb (mL): 70 mL    Voided (mL): 400 mL  Total OUT: 470 mL    Total NET: -220 mL      Physical Exam  General: NAD, resting comfortably in bed  C/V: NSR  Pulm: Nonlabored breathing, no respiratory distress  Abd: soft, non-tender, non-distended.  Extrem: WWP, LLE shin wound healthy with good granulation, no erythema or drianage, RLE ace wrapped w/ drain in place, ss,

## 2020-10-01 NOTE — PROGRESS NOTE ADULT - SUBJECTIVE AND OBJECTIVE BOX
S/p revision right knee antibiotic spacer at [facility]  SUBJECTIVE: Patient seen and examined.  Pt without complaints.   Denies chest pain/SOB/dizziness/n/v/HA   Pain well controlled.       OBJECTIVE:     Vital Signs Last 24 Hrs  T(C): 36.6 (01 Oct 2020 13:12), Max: 37.1 (30 Sep 2020 14:21)  T(F): 97.8 (01 Oct 2020 13:12), Max: 98.7 (30 Sep 2020 14:21)  HR: 89 (01 Oct 2020 13:12) (77 - 89)  BP: 95/55 (01 Oct 2020 13:12) (95/55 - 109/56)  BP(mean): --  RR: 16 (01 Oct 2020 13:12) (16 - 20)  SpO2: 94% (01 Oct 2020 13:12) (94% - 98%)    PE:  Pleasant more comfortable today. A&O x3.          Dressing: clean/dry/intact, ace and brace in place. MIS drains in place.          Sensation: intact to light touch to patient's baseline         Motor exam:  unchanged, ta/gs firing BLE.          Skin warm, well-perfused; capillary refill brisk               ASSESSMENT AND PLAN: s/p revision right knee antibiotic spacer and STSG   1. Stable. Brace in place, knee dressing clean and dry.   **Awaiting vanc trough, ESR/CRP, CBC, BMP 4pm this afternoon.   Appreciate consulting recs:  Plastics- gave bacitracin for donor sites, pt very pleased with relief this is giving him; keep MIS drain until less than 20mL/24hrs, OK to be discharged with drain. Skin graft donor sites dressings should be tegaderm only. Right knee and left skin graft sites- aquaphor and adaptec, then abd pads, kerlix and gauze or open to air. Right leg gently wrapped w ace at all times.   Cardio- furseomide PRN, cardiac stable for d/c  Med- Dr. Sundar ANAYA- vanc trough and labs today at 4pm  Surgery- signed off, f/u with Dr. Latif in clinic for future lap sudhir  Pain Mgt  2. Analgesic pain control  3. DVT prophylaxis: SCDs, effient/asa   4. Weight Bearing Status:  TTWB RLE in seamus, WBAT LLE   5. Disposition: pending PINKY authorization

## 2020-10-01 NOTE — PROGRESS NOTE ADULT - SUBJECTIVE AND OBJECTIVE BOX
Pain Management Progress Note - [facility] [phone]      HPI: Patient seen and examined today. Patient with a history of knee pain, diabetic neuropathy, CHF, HTN, MRSA bacteremia, diverticulitis, COPD, hyperkalemia, chronic systolic CHF, osteoarthritis, s/p R Revision TKA and antibiotic spacer by Dr. Castro on 7/22, now presenting with R knee pain and swelling x3 days, now s/p R knee spacer exchange, gastro flap with skin graft.  Patient reports R knee pain, LLE pain, patient reports moderate pain relief with current pain medication regimen. Patient Axox3, denies any itchiness from Morphine Po. reviewed pain medication regimen with patient at bedside.  Patient requesting a long acting pain medication for optimal pain relief.          Pain is _x__ sharp ____dull ___burning _x__achy ___ Intensity: ____ mild _x__mod x__severe     Location _x___surgical site ____cervical _____lumbar ____abd ____upper ext__x__lower ext    Worse with _x___activity _x___movement _____physical therapy___ Rest    Improved with _x___medication __x__rest ____physical therapy        morphine ER Tablet  AQUAPHOR (petrolatum Ointment)  furosemide   Injectable  morphine  - Injectable  morphine  - Injectable  vancomycin  IVPB  lactated ringers.  ketorolac   Injectable  metoclopramide Injectable  simethicone  dextrose 5%.  dextrose 40% Gel  glucagon  Injectable  mupirocin 2% Ointment  morphine  IR  morphine  IR  BACItracin   Ointment  mupirocin 2% Ointment  ketorolac   Injectable  gabapentin  morphine  - Injectable  ketorolac   Injectable  potassium phosphate / sodium phosphate Powder (PHOS-NaK)  insulin glargine Injectable (LANTUS)  chlorhexidine 2% Cloths  sodium chloride 0.9% lock flush  gabapentin  gabapentin  sertraline  rosuvastatin  senna  polyethylene glycol 3350  nystatin Cream  potassium phosphate / sodium phosphate Powder (PHOS-NaK)  insulin glargine Injectable (LANTUS)  insulin lispro Injectable (HumaLOG)  cyclobenzaprine  aspirin enteric coated  diphenhydrAMINE  tamsulosin  HYDROmorphone  Injectable  digoxin     Tablet  HYDROmorphone PCA (1 mG/mL)  prasugrel  aspirin enteric coated  ondansetron Injectable  naloxone Injectable  HYDROmorphone PCA (1 mG/mL)  spironolactone  digoxin     Tablet  glucagon  Injectable  dextrose 50% Injectable  dextrose 50% Injectable  dextrose 50% Injectable  dextrose 40% Gel  dextrose 5%.  insulin lispro (HumaLOG) corrective regimen sliding scale  magnesium sulfate  IVPB  metoprolol succinate ER  cefepime   IVPB  rifAMPin  DAPTOmycin IVPB  morphine  - Injectable  gabapentin  cyclobenzaprine  morphine  IR  morphine  IR  lactated ringers.  HYDROmorphone  Injectable  BUpivacaine liposome 1.3% Injectable (no eMAR)  morphine  IR  morphine  IR  morphine  - Injectable  lactated ringers.  mupirocin 2% Ointment  nystatin Cream  morphine  IR  morphine  IR  nystatin Powder  morphine  - Injectable  chlorhexidine 2% Cloths  insulin glargine Injectable (LANTUS)  insulin glargine Injectable (LANTUS)  insulin glargine Injectable (LANTUS)  pantoprazole  Injectable  metoclopramide Injectable  acetaminophen   Tablet ..  diphenhydrAMINE   Injectable  lactated ringers.  levothyroxine  sodium chloride 0.9% Bolus  gabapentin  gabapentin  gabapentin  sertraline  insulin glargine Injectable (LANTUS)  morphine  IR  morphine  IR  morphine  - Injectable  HYDROmorphone   Tablet  HYDROmorphone   Tablet  insulin lispro Injectable (HumaLOG)        ROS: Const:  -___febrile   Eyes:___ENT:___CV: __-_chest pain  Resp: __-__sob  GI:_-__nausea __-_vomiting _-_ abd pain ___npo ___clears _x_full diet __bm  :___ Musk: _x__pain _-__spasm  Skin:___ Neuro:  _-__jernsawi_-__ctjszzunl_-__ numbness _-__weakness __x_paresth  Psych:_-_anxiety  Endo:___ Heme:___Allergy:_________, _x__all others reviewed and negative      PAST MEDICAL & SURGICAL HISTORY:  Diabetic neuropathy  STEMI (ST elevation myocardial infarction)  Diverticulitis  MRSA bacteremia  History of celiac disease  CHF (congestive heart failure): EF ~ 25%  HTN (hypertension)  Diabetes: on insulin pump  Blood clot due to device, implant, or graft: was on blood thinners  HLD (hyperlipidemia)  Osteoarthritis  Atherosclerosis of coronary artery: CAD (coronary artery disease)  Status post percutaneous transluminal coronary angioplasty: in 2012  History of open reduction and internal fixation (ORIF) procedure  Surgery, elective: Right shoulder  Surgery, elective: right knee wound debridement  S/P TKR (total knee replacement), right: with infection Mrsa   per pt he was cleared from MRSA infection  S/P CABG x 1: 2018  Stented coronary artery: 10/18 heart attack  INFERIOR WALL MI  Other postprocedural status: Fixation hardware in foot LEFT    10-01 @ 16:5496 mL/min/1.73M2      Hemoglobin: 8.1 g/dL (10-01 @ 16:54)      T(C): 36.8 (10-01-20 @ 16:24), Max: 37.1 (10-01-20 @ 08:49)  HR: 91 (10-01-20 @ 16:24) (77 - 91)  BP: 112/62 (10-01-20 @ 16:24) (95/55 - 112/62)  RR: 16 (10-01-20 @ 16:24) (16 - 20)  SpO2: 92% (10-01-20 @ 16:24) (92% - 98%)  Wt(kg): --           PHYSICAL EXAM:  Gen Appearance: x___no acute distress _x__appropriate        Neuro: _x__SILT feet____ EOM Intact Psych: AAOX__3, x___mood/affect appropriate        Eyes: __x_conjunctiva WNL  __x__ Pupils equal and round        ENT: x___ears and nose atraumatic_x__ Hearing grossly intact        Neck: _x__trachea midline, no visible masses ___thyroid without palpable mass    Resp: _x__Nml WOB____No tactile fremitus ___clear to auscultation    Cardio: ___extremities free from edema __x__pedal pulses palpable    GI/Abdomen: __x_soft ___x__ Nontender___x___Nondistended_____HSM    Lymphatic: ___no palpable nodes in neck  ___no palpable nodes calves and feet    Skin/Wound: ___Incision, _x__Dressing c/d/i,   ____surrounding tissues soft,  ___drain/chest tube present____    Muscular: EHL _4__/5  Gastrocnemius_4__/5    ___absent clubbing/cyanosis          ASSESSMENT: This is a 65y old Male with a history of knee pain, diabetic neuropathy, CHF, HTN, MRSA bacteremia, diverticulitis, COPD, hyperkalemia, chronic systolic CHF, osteoarthritis,  s/p R Revision TKA and antibiotic spacer by Dr. Castro on 7/22, now presenting with R knee pain and swelling x3 days, now s/p R knee spacer exchange, gastro flap with skin graft, pain controlled with current pain medication regimen.      Recommended Treatment PLAN:  1. Morphine IR 30mg Po Q4h prn moderate to severe pain  2. Flexeril 5mg PO Q8h prn muscle spasms  3. Gabapentin 300mg PO TID  4. Morphine 4mg IVP Q4h prn breakthrough pain  5. Mscontin 15mg PO ER Daily  Plan discussed with Dr. Max

## 2020-10-01 NOTE — PROGRESS NOTE ADULT - SUBJECTIVE AND OBJECTIVE BOX
Interval Events: Reviewed  Patient seen and examined at bedside.    Patient is a 65y old  Male who presents with a chief complaint of R Knee Swelling (01 Oct 2020 19:25)    he is not happy with the surgeries and pain is uncontrolled  PAST MEDICAL & SURGICAL HISTORY:  Diabetic neuropathy    STEMI (ST elevation myocardial infarction)    Diverticulitis    MRSA bacteremia    History of celiac disease    CHF (congestive heart failure)  EF ~ 25%    HTN (hypertension)    Diabetes  on insulin pump    Blood clot due to device, implant, or graft  was on blood thinners    HLD (hyperlipidemia)    Osteoarthritis    Atherosclerosis of coronary artery  CAD (coronary artery disease)    Status post percutaneous transluminal coronary angioplasty  in 2012    History of open reduction and internal fixation (ORIF) procedure    Surgery, elective  Right shoulder    Surgery, elective  right knee wound debridement    S/P TKR (total knee replacement), right  with infection Mrsa   per pt he was cleared from MRSA infection    S/P CABG x 1  2018    Stented coronary artery  10/18 heart attack  INFERIOR WALL MI    Other postprocedural status  Fixation hardware in foot LEFT        MEDICATIONS:  Pulmonary:    Antimicrobials:  rifAMPin 300 milliGRAM(s) Oral every 12 hours  vancomycin  IVPB 1250 milliGRAM(s) IV Intermittent every 24 hours    Anticoagulants:  aspirin enteric coated 81 milliGRAM(s) Oral two times a day  prasugrel 10 milliGRAM(s) Oral daily    Cardiac:  digoxin     Tablet 0.125 milliGRAM(s) Oral daily  metoprolol succinate ER 25 milliGRAM(s) Oral daily  spironolactone 25 milliGRAM(s) Oral daily  tamsulosin 0.4 milliGRAM(s) Oral at bedtime      Allergies    ACE inhibitors (Hives)  carvedilol (Other)  enalapril (Hives)  Entresto (Other)    Intolerances        Vital Signs Last 24 Hrs  T(C): 36.8 (01 Oct 2020 16:24), Max: 37.1 (01 Oct 2020 08:49)  T(F): 98.3 (01 Oct 2020 16:24), Max: 98.7 (01 Oct 2020 08:49)  HR: 91 (01 Oct 2020 16:24) (77 - 91)  BP: 112/62 (01 Oct 2020 16:24) (95/55 - 112/62)  BP(mean): --  RR: 16 (01 Oct 2020 16:24) (16 - 20)  SpO2: 92% (01 Oct 2020 16:24) (92% - 98%)    09-30 @ 07:01  -  10-01 @ 07:00  --------------------------------------------------------  IN: 250 mL / OUT: 470 mL / NET: -220 mL    10-01 @ 07:01  -  10-01 @ 22:06  --------------------------------------------------------  IN: 0 mL / OUT: 500 mL / NET: -500 mL          Review of Systems:   •	General: negative  •	Skin/Breast: negative  •	Ophthalmologic: negative  •	ENMT: negative  •	Respiratory and Thorax: negative  •	Cardiovascular: negative  •	Gastrointestinal: negative  •	Genitourinary: negative  •	Musculoskeletal: negative  •	Neurological: negative  •	Psychiatric: negative  •	Hematology/Lymphatics: negative  •	Endocrine: negative  •	Allergic/Immunologic: negative    Physical Exam:   • Constitutional:	Well-developed, well nourished  • Eyes:	EOMI; PERRL; no drainage or redness  • ENMT:	No oral lesions; no gross abnormalities  • Neck	No bruits; no thyromegaly or nodules  • Breasts:	not examined  • Back:	No deformity or limitation of movement  • Respiratory:	Breath Sounds equal & clear to percussion & auscultation, no accessory muscle use  • Cardiovascular:	Regular rate & rhythm, normal S1, S2; no murmurs, gallops or rubs; no S3, S4  • Gastrointestinal:	Soft, non-tender, no hepatosplenomegaly, normal bowel sounds  • Genitourinary:	not examined  • Rectal: not examined  • Extremities:	No cyanosis, clubbing or edema  • Vascular:	Equal and normal pulses (carotid, femoral, dorsalis pedis)  • Neurologica:l	not examined  • Skin:	No lesions; no rash  • Lymph Nodes:	No lymphadedenopathy  • Musculoskeletal:	No joint pain, swelling or deformity; no limitation of movement        LABS:      CBC Full  -  ( 01 Oct 2020 16:54 )  WBC Count : 12.89 K/uL  RBC Count : 3.81 M/uL  Hemoglobin : 8.1 g/dL  Hematocrit : 28.6 %  Platelet Count - Automated : 565 K/uL  Mean Cell Volume : 75.1 fl  Mean Cell Hemoglobin : 21.3 pg  Mean Cell Hemoglobin Concentration : 28.3 gm/dL  Auto Neutrophil # : x  Auto Lymphocyte # : x  Auto Monocyte # : x  Auto Eosinophil # : x  Auto Basophil # : x  Auto Neutrophil % : x  Auto Lymphocyte % : x  Auto Monocyte % : x  Auto Eosinophil % : x  Auto Basophil % : x    10-01    137  |  100  |  20  ----------------------------<  129<H>  4.3   |  28  |  0.78    Ca    8.0<L>      01 Oct 2020 16:54    TPro  6.4  /  Alb  2.7<L>  /  TBili  0.4  /  DBili  x   /  AST  16  /  ALT  16  /  AlkPhos  136<H>  10-01                        RADIOLOGY & ADDITIONAL STUDIES (The following images were personally reviewed):  Loja:                                     No  Urine output:                       adequate  DVT prophylaxis:                 Yes  Flattus:                                  Yes  Bowel movement:              No

## 2020-10-01 NOTE — PROGRESS NOTE ADULT - SUBJECTIVE AND OBJECTIVE BOX
INTERVAL HISTORY:  Less dyspnea.	  Chart, [facility] medications and allergies reviewed    MEDICATIONS:  MEDICATIONS  (STANDING):  AQUAPHOR (petrolatum Ointment) 1 Application(s) Topical two times a day  aspirin enteric coated 81 milliGRAM(s) Oral two times a day  BACItracin   Ointment 1 Application(s) Topical daily  chlorhexidine 2% Cloths 1 Application(s) Topical <User Schedule>  dextrose 5%. 1000 milliLiter(s) (50 mL/Hr) IV Continuous <Continuous>  dextrose 5%. 1000 milliLiter(s) (50 mL/Hr) IV Continuous <Continuous>  dextrose 50% Injectable 12.5 Gram(s) IV Push once  dextrose 50% Injectable 25 Gram(s) IV Push once  dextrose 50% Injectable 25 Gram(s) IV Push once  digoxin     Tablet 0.125 milliGRAM(s) Oral daily  gabapentin 300 milliGRAM(s) Oral three times a day  insulin glargine Injectable (LANTUS) 20 Unit(s) SubCutaneous at bedtime  insulin lispro (HumaLOG) corrective regimen sliding scale   SubCutaneous Before meals and at bedtime  metoprolol succinate ER 25 milliGRAM(s) Oral daily  nystatin Cream 1 Application(s) Topical two times a day  polyethylene glycol 3350 17 Gram(s) Oral daily  prasugrel 10 milliGRAM(s) Oral daily  rifAMPin 300 milliGRAM(s) Oral every 12 hours  rosuvastatin 10 milliGRAM(s) Oral at bedtime  sertraline 25 milliGRAM(s) Oral daily  spironolactone 25 milliGRAM(s) Oral daily  tamsulosin 0.4 milliGRAM(s) Oral at bedtime  vancomycin  IVPB 1250 milliGRAM(s) IV Intermittent every 24 hours      REVIEW OF SYSTEMS:  CONSTITUTIONAL: No fever, no weight loss, no fatigue  PULMONARY: No cough, no wheezing, chills no hemoptysis; No Shortness of Breath  CARDIOVASCULAR: No chest pain, no palpitations, no syncope, dizziness, no leg swelling  GASTROINTESTINAL: No abdominal pain. No nausea, no  vomiting, no hematemesis; No diarrhea, no constipation. No melena, no hematochezia.  GENITOURINARY: No dysuria, no frequency, no hematuria, no incontinence  SKIN: No itching, no burning, no rashes, no lesions     PHYSICAL EXAM:  T(C): 37.1 (10-01-20 @ 08:49), Max: 37.1 (09-30-20 @ 14:21)  HR: 87 (10-01-20 @ 08:49) (77 - 88)  BP: 102/59 (10-01-20 @ 08:49) (97/50 - 109/56)  RR: 19 (10-01-20 @ 08:49) (16 - 20)  SpO2: 96% (10-01-20 @ 08:49) (94% - 98%)  Wt(kg): --  I&O's Summary    30 Sep 2020 07:01  -  01 Oct 2020 07:00  --------------------------------------------------------  IN: 250 mL / OUT: 470 mL / NET: -220 mL        Appearance:  No deformities, normal appearance	  HEENT:   Normal oral mucosa, PERRL, EOMI	  Neck: No jvd , no masses  Lymphatic: No  lymphadenopathy  Pulmonary: Lungs clear to auscultation and percussion  Cardiovascular: Normal S1 S2, No JVD, No murmur, 1+edema	  Abdomen:  Soft, Non-tender, + BS  Skin: No rashes, No ecchymoses	  Extremities:  No clubbing or cyanosis  MSK: Normal range of motion, no joint swelling    LABS:		  CARDIAC MARKERS:         proBNP:  Lipid Profile:  HgA1c:  TSH:      TELEMETRY: a sense v pace	      QTC     ECG:  	   QTC         RADIOLOGY:   DIAGNOSTIC TESTING: [ ] Echocardiogram: [ ]  Catheterization: [ ] Stress Test:      ASSESSMENT/PLAN: 	  Severely reduced ejection fraction-Dyspnea is improved.  Mild edema. Continue metoprolol digoxin and spironolactone.    Coronary artery disease-the patient denies chest discomfort.  continue prasugrel and aspirin.     Ventiricular tachycardia/Defibrillator- asymptomatic. Short run of VT. Does not need monitoring. EP to check ICD.    Hyponatremia- mild-agree with discontinuing fluid restriction    Septic knee- as per ID and orthopedics.

## 2020-10-01 NOTE — PROGRESS NOTE ADULT - SUBJECTIVE AND OBJECTIVE BOX
POST OPERATIVE DAY #  SUBJECTIVE: Patient seen and examined.  Pt only complaining of uncontrolled pain at donor sites, requesting topical anesthetic  Denies chest pain/SOB/dizziness/n/v/HA   Pain well controlled.       OBJECTIVE:     Vital Signs Last 24 Hrs  T(C): 36.6 (01 Oct 2020 04:41), Max: 37.1 (30 Sep 2020 14:21)  T(F): 97.9 (01 Oct 2020 04:41), Max: 98.7 (30 Sep 2020 14:21)  HR: 77 (01 Oct 2020 04:41) (77 - 88)  BP: 109/56 (01 Oct 2020 06:16) (97/50 - 113/61)  BP(mean): --  RR: 19 (01 Oct 2020 06:16) (16 - 20)  SpO2: 98% (01 Oct 2020 06:16) (94% - 98%)    PE: Comfortable. NAD. Alert, oriented.          Dressing: clean/dry/intact BLE, STSG dressings clean and dry          Sensation: intact to light touch to patient's baseline BLE          Motor exam:  firing TA/GS BLE,  wiggles toes          Skin warm, well-perfused; capillary refill brisk             ASSESSMENT AND PLAN: 65M s/p right knee revision and antibiotic spacer placement with gastroc flap, LLE STSG now on IV abx, awaiting placement to rehab  1. F/u Psych recs   Appreciate Plastics recs (OK for discharge from their standpoint)- f/u with them if ok to put topical lidocaine on donor sites   Appreciate ID recs (continue Vanco, trough this afternoon)  Appreciate Med & Cardio (Dr. Kwok, Dr. Caldera) recs- no evidence of decompensated CHF- fluids discontinued for SOB, resolving with lasix pushes. Labs reviewed.   2. Analgesic pain control  3. DVT prophylaxis: SCDs  4. Weight Bearing Status:  WBAT   5. Disposition: [facility], SW working with patient to get choices and apply/auth

## 2020-10-02 LAB
GLUCOSE BLDC GLUCOMTR-MCNC: 148 MG/DL — HIGH (ref 70–99)
GLUCOSE BLDC GLUCOMTR-MCNC: 224 MG/DL — HIGH (ref 70–99)
GLUCOSE BLDC GLUCOMTR-MCNC: 301 MG/DL — HIGH (ref 70–99)
GLUCOSE BLDC GLUCOMTR-MCNC: 85 MG/DL — SIGNIFICANT CHANGE UP (ref 70–99)
GLUCOSE BLDC GLUCOMTR-MCNC: 97 MG/DL — SIGNIFICANT CHANGE UP (ref 70–99)
HCT VFR BLD CALC: 28.8 % — LOW (ref 39–50)
HGB BLD-MCNC: 8.3 G/DL — LOW (ref 13–17)
MCHC RBC-ENTMCNC: 21.5 PG — LOW (ref 27–34)
MCHC RBC-ENTMCNC: 28.8 GM/DL — LOW (ref 32–36)
MCV RBC AUTO: 74.6 FL — LOW (ref 80–100)
NRBC # BLD: 0 /100 WBCS — SIGNIFICANT CHANGE UP (ref 0–0)
PLATELET # BLD AUTO: 533 K/UL — HIGH (ref 150–400)
RBC # BLD: 3.86 M/UL — LOW (ref 4.2–5.8)
RBC # FLD: 23.7 % — HIGH (ref 10.3–14.5)
WBC # BLD: 13.7 K/UL — HIGH (ref 3.8–10.5)
WBC # FLD AUTO: 13.7 K/UL — HIGH (ref 3.8–10.5)

## 2020-10-02 PROCEDURE — 99232 SBSQ HOSP IP/OBS MODERATE 35: CPT

## 2020-10-02 PROCEDURE — 99232 SBSQ HOSP IP/OBS MODERATE 35: CPT | Mod: GC

## 2020-10-02 RX ORDER — SPIRONOLACTONE 25 MG/1
1 TABLET, FILM COATED ORAL
Qty: 0 | Refills: 0 | DISCHARGE

## 2020-10-02 RX ORDER — INSULIN LISPRO 100/ML
6 VIAL (ML) SUBCUTANEOUS
Refills: 0 | Status: DISCONTINUED | OUTPATIENT
Start: 2020-10-02 | End: 2020-10-03

## 2020-10-02 RX ORDER — METOPROLOL TARTRATE 50 MG
1 TABLET ORAL
Qty: 0 | Refills: 0 | DISCHARGE
Start: 2020-10-02

## 2020-10-02 RX ORDER — MORPHINE SULFATE 50 MG/1
1 CAPSULE, EXTENDED RELEASE ORAL
Qty: 0 | Refills: 0 | DISCHARGE
Start: 2020-10-02

## 2020-10-02 RX ORDER — INSULIN GLARGINE 100 [IU]/ML
20 INJECTION, SOLUTION SUBCUTANEOUS
Qty: 0 | Refills: 0 | DISCHARGE
Start: 2020-10-02

## 2020-10-02 RX ORDER — DIGOXIN 250 MCG
1 TABLET ORAL
Qty: 0 | Refills: 0 | DISCHARGE
Start: 2020-10-02

## 2020-10-02 RX ORDER — GABAPENTIN 400 MG/1
1 CAPSULE ORAL
Qty: 0 | Refills: 0 | DISCHARGE
Start: 2020-10-02

## 2020-10-02 RX ORDER — SPIRONOLACTONE 25 MG/1
1 TABLET, FILM COATED ORAL
Qty: 0 | Refills: 0 | DISCHARGE
Start: 2020-10-02

## 2020-10-02 RX ORDER — INSULIN LISPRO 100/ML
2 VIAL (ML) SUBCUTANEOUS
Qty: 0 | Refills: 0 | DISCHARGE
Start: 2020-10-02

## 2020-10-02 RX ORDER — MORPHINE SULFATE 50 MG/1
30 CAPSULE, EXTENDED RELEASE ORAL EVERY 4 HOURS
Refills: 0 | Status: DISCONTINUED | OUTPATIENT
Start: 2020-10-02 | End: 2020-10-03

## 2020-10-02 RX ORDER — SERTRALINE 25 MG/1
1 TABLET, FILM COATED ORAL
Qty: 0 | Refills: 0 | DISCHARGE
Start: 2020-10-02

## 2020-10-02 RX ORDER — VANCOMYCIN HCL 1 G
1.25 VIAL (EA) INTRAVENOUS
Qty: 0 | Refills: 0 | DISCHARGE
Start: 2020-10-02

## 2020-10-02 RX ORDER — POLYETHYLENE GLYCOL 3350 17 G/17G
17 POWDER, FOR SOLUTION ORAL
Qty: 0 | Refills: 0 | DISCHARGE
Start: 2020-10-02

## 2020-10-02 RX ORDER — ASPIRIN/CALCIUM CARB/MAGNESIUM 324 MG
1 TABLET ORAL
Qty: 0 | Refills: 0 | DISCHARGE
Start: 2020-10-02

## 2020-10-02 RX ORDER — TAMSULOSIN HYDROCHLORIDE 0.4 MG/1
1 CAPSULE ORAL
Qty: 0 | Refills: 0 | DISCHARGE
Start: 2020-10-02

## 2020-10-02 RX ORDER — CYCLOBENZAPRINE HYDROCHLORIDE 10 MG/1
1 TABLET, FILM COATED ORAL
Qty: 0 | Refills: 0 | DISCHARGE
Start: 2020-10-02

## 2020-10-02 RX ORDER — MORPHINE SULFATE 50 MG/1
2 CAPSULE, EXTENDED RELEASE ORAL
Qty: 0 | Refills: 0 | DISCHARGE
Start: 2020-10-02

## 2020-10-02 RX ORDER — SENNA PLUS 8.6 MG/1
2 TABLET ORAL
Qty: 0 | Refills: 0 | DISCHARGE
Start: 2020-10-02

## 2020-10-02 RX ORDER — MORPHINE SULFATE 50 MG/1
30 CAPSULE, EXTENDED RELEASE ORAL EVERY 4 HOURS
Refills: 0 | Status: DISCONTINUED | OUTPATIENT
Start: 2020-10-02 | End: 2020-10-02

## 2020-10-02 RX ORDER — PANTOPRAZOLE SODIUM 20 MG/1
1 TABLET, DELAYED RELEASE ORAL
Qty: 0 | Refills: 0 | DISCHARGE

## 2020-10-02 RX ORDER — PRASUGREL 5 MG/1
1 TABLET, FILM COATED ORAL
Qty: 0 | Refills: 0 | DISCHARGE
Start: 2020-10-02

## 2020-10-02 RX ADMIN — MORPHINE SULFATE 30 MILLIGRAM(S): 50 CAPSULE, EXTENDED RELEASE ORAL at 01:20

## 2020-10-02 RX ADMIN — Medication 81 MILLIGRAM(S): at 18:33

## 2020-10-02 RX ADMIN — MORPHINE SULFATE 30 MILLIGRAM(S): 50 CAPSULE, EXTENDED RELEASE ORAL at 17:28

## 2020-10-02 RX ADMIN — GABAPENTIN 300 MILLIGRAM(S): 400 CAPSULE ORAL at 15:47

## 2020-10-02 RX ADMIN — Medication 1 APPLICATION(S): at 15:47

## 2020-10-02 RX ADMIN — ROSUVASTATIN CALCIUM 10 MILLIGRAM(S): 5 TABLET ORAL at 22:36

## 2020-10-02 RX ADMIN — MORPHINE SULFATE 30 MILLIGRAM(S): 50 CAPSULE, EXTENDED RELEASE ORAL at 16:28

## 2020-10-02 RX ADMIN — MORPHINE SULFATE 4 MILLIGRAM(S): 50 CAPSULE, EXTENDED RELEASE ORAL at 03:00

## 2020-10-02 RX ADMIN — MORPHINE SULFATE 4 MILLIGRAM(S): 50 CAPSULE, EXTENDED RELEASE ORAL at 20:11

## 2020-10-02 RX ADMIN — Medication 8: at 18:26

## 2020-10-02 RX ADMIN — MORPHINE SULFATE 15 MILLIGRAM(S): 50 CAPSULE, EXTENDED RELEASE ORAL at 12:30

## 2020-10-02 RX ADMIN — SERTRALINE 25 MILLIGRAM(S): 25 TABLET, FILM COATED ORAL at 12:30

## 2020-10-02 RX ADMIN — MORPHINE SULFATE 30 MILLIGRAM(S): 50 CAPSULE, EXTENDED RELEASE ORAL at 11:15

## 2020-10-02 RX ADMIN — Medication 4: at 12:30

## 2020-10-02 RX ADMIN — MORPHINE SULFATE 30 MILLIGRAM(S): 50 CAPSULE, EXTENDED RELEASE ORAL at 01:07

## 2020-10-02 RX ADMIN — GABAPENTIN 300 MILLIGRAM(S): 400 CAPSULE ORAL at 22:36

## 2020-10-02 RX ADMIN — MORPHINE SULFATE 4 MILLIGRAM(S): 50 CAPSULE, EXTENDED RELEASE ORAL at 02:36

## 2020-10-02 RX ADMIN — Medication 1 APPLICATION(S): at 18:28

## 2020-10-02 RX ADMIN — PRASUGREL 10 MILLIGRAM(S): 5 TABLET, FILM COATED ORAL at 12:30

## 2020-10-02 RX ADMIN — MORPHINE SULFATE 15 MILLIGRAM(S): 50 CAPSULE, EXTENDED RELEASE ORAL at 01:30

## 2020-10-02 RX ADMIN — Medication 25 MILLIGRAM(S): at 12:35

## 2020-10-02 RX ADMIN — MORPHINE SULFATE 30 MILLIGRAM(S): 50 CAPSULE, EXTENDED RELEASE ORAL at 12:15

## 2020-10-02 RX ADMIN — Medication 166.67 MILLIGRAM(S): at 16:41

## 2020-10-02 RX ADMIN — TAMSULOSIN HYDROCHLORIDE 0.4 MILLIGRAM(S): 0.4 CAPSULE ORAL at 22:36

## 2020-10-02 RX ADMIN — NYSTATIN CREAM 1 APPLICATION(S): 100000 CREAM TOPICAL at 18:28

## 2020-10-02 NOTE — PROGRESS NOTE ADULT - SUBJECTIVE AND OBJECTIVE BOX
Pt comfortable. Pain controlled.   No acute events overnight.     Exam:  AVSS. NAD.   Surgical incision clean/dry/intact.   Skin graft donor sites clean, healing well.   Right knee with healthy muscle flap and overlying skin graft.   Left leg skin graft with good take. 1cm x 1cm area without adherance.   No evidence of fluctuance in right posterior leg.   Right posterior leg MIS drain 60-70mL/24 hours.

## 2020-10-02 NOTE — PROGRESS NOTE ADULT - PROBLEM SELECTOR PROBLEM 8
Atherosclerosis of native coronary artery of native heart without angina pectoris

## 2020-10-02 NOTE — PROGRESS NOTE ADULT - NSTELEHEALTH_GEN_ALL_CORE
No

## 2020-10-02 NOTE — PROGRESS NOTE ADULT - PROBLEM SELECTOR PLAN 4
as above

## 2020-10-02 NOTE — PROGRESS NOTE ADULT - ASSESSMENT
66 y/o M POD # 13 s/p placement of R knee antibiotic spacer, gastroc flap, STSG, L shin vac. Clinically stable.     1. Skin graft donor site dressings: Tegaderm only.   Right knee and Left leg skin graft sites cover with Aquaphor and adaptec, may place ABD pads and kerlix gauze or leave open to air.   Right leg should be gently wrapped with ace wrap at all times.     2. Maintain MIS until less than 20mL/24 hours. Will likely be discharged with this. Still high output.   3. TTWB right side. Full WB left site.   4.Ok to be discharged, pending insurance Auth

## 2020-10-02 NOTE — PROGRESS NOTE ADULT - PROBLEM SELECTOR PROBLEM 10
Anemia due to blood loss

## 2020-10-02 NOTE — PROGRESS NOTE ADULT - ASSESSMENT
A/P: Pt doing well s/p muscle flap closure.   1. Dressings:  Right knee and Left leg skin graft sites cover with bacitracin, adaptec, ABD pads and kerlix gauze.   Right leg should be gently wrapped with ace wrap at all times.     2. Maintain MIS until less than 20mL/24 hours. Ok to be discharged with this.   3. Dispo to rehab pending   4. Ok to be discharged from [facility] standpoint  5. TTWB right side. Full WB left site.

## 2020-10-02 NOTE — PROGRESS NOTE ADULT - SUBJECTIVE AND OBJECTIVE BOX
Orthopaedic Surgery Progress Note    Subjective:     Patient seen and examined. Patient comfortable. Doesn't want to go to [facility], but doesn't feel ready to go home.   Denies chest pain, shortness of breath, nausea/vomiting, numbness/tingling.    Objective:    Vital Signs Last 24 Hrs  T(C): 37.3 (10-02-20 @ 11:06), Max: 37.3 (10-02-20 @ 11:06)  T(F): 99.2 (10-02-20 @ 11:06), Max: 99.2 (10-02-20 @ 11:06)  HR: 94 (10-02-20 @ 11:06) (94 - 94)  BP: 104/62 (10-02-20 @ 11:06) (104/62 - 104/62)  RR: 16 (10-02-20 @ 11:06) (16 - 16)  SpO2: 94% (10-02-20 @ 11:06) (94% - 94%)  AVSS    PE:  General: Patient alert and oriented, NAD  Dressing: Clean/dry/intact BLE   Pulses: 2+ DP BLE   Sensation: SILT BLE   Motor: EHL/FHL/TA/GS 5/5 BLE                          8.3    13.70 )-----------( 533      ( 02 Oct 2020 06:50 )             28.8   01 Oct 2020 16:54    137    |  100    |  20     ----------------------------<  129    4.3     |  28     |  0.78       TPro  6.4    /  Alb  2.7    /  TBili  0.4    /  DBili  x      /  AST  16     /  ALT  16     /  AlkPhos  136    01 Oct 2020 16:54      A/P: 65yMale s/p right knee revision and antibiotic spacer placement with gastroc flap, LLE STSG now on IV abx, awaiting placement to rehab  1. Pain control as needed. Appreciate PM recs.  2. DVT prophylaxis: ASA, Effient  3. PT, weight-bearing status: TTWB RLE, WBAT LLE   4. Appreciate plastics recs. Continue MIS until <20 cc output.   5. Continue antibiotics. Appreciate ID recs.   6. Appreciate psych recs.   7. Dispo: [facility] pending auth    Ortho Pager [phone]

## 2020-10-02 NOTE — PROGRESS NOTE ADULT - SUBJECTIVE AND OBJECTIVE BOX
INTERVAL HISTORY:  No dyspnea	  Chart, [facility] medications and allergies reviewed    MEDICATIONS:  MEDICATIONS  (STANDING):  AQUAPHOR (petrolatum Ointment) 1 Application(s) Topical two times a day  aspirin enteric coated 81 milliGRAM(s) Oral two times a day  BACItracin   Ointment 1 Application(s) Topical daily  chlorhexidine 2% Cloths 1 Application(s) Topical <User Schedule>  dextrose 5%. 1000 milliLiter(s) (50 mL/Hr) IV Continuous <Continuous>  dextrose 5%. 1000 milliLiter(s) (50 mL/Hr) IV Continuous <Continuous>  dextrose 50% Injectable 12.5 Gram(s) IV Push once  dextrose 50% Injectable 25 Gram(s) IV Push once  dextrose 50% Injectable 25 Gram(s) IV Push once  digoxin     Tablet 0.125 milliGRAM(s) Oral daily  gabapentin 300 milliGRAM(s) Oral three times a day  insulin glargine Injectable (LANTUS) 20 Unit(s) SubCutaneous at bedtime  insulin lispro (HumaLOG) corrective regimen sliding scale   SubCutaneous Before meals and at bedtime  metoprolol succinate ER 25 milliGRAM(s) Oral daily  morphine ER Tablet 15 milliGRAM(s) Oral daily  nystatin Cream 1 Application(s) Topical two times a day  polyethylene glycol 3350 17 Gram(s) Oral daily  prasugrel 10 milliGRAM(s) Oral daily  rifAMPin 300 milliGRAM(s) Oral every 12 hours  rosuvastatin 10 milliGRAM(s) Oral at bedtime  sertraline 25 milliGRAM(s) Oral daily  spironolactone 25 milliGRAM(s) Oral daily  tamsulosin 0.4 milliGRAM(s) Oral at bedtime  vancomycin  IVPB 1250 milliGRAM(s) IV Intermittent every 24 hours      REVIEW OF SYSTEMS:  CONSTITUTIONAL: No fever, no weight loss, no fatigue  PULMONARY: No cough, no wheezing, chills no hemoptysis; No Shortness of Breath  CARDIOVASCULAR: No chest pain, no palpitations, no syncope, dizziness, no leg swelling  GASTROINTESTINAL: No abdominal pain. No nausea, no  vomiting, no hematemesis; No diarrhea, no constipation. No melena, no hematochezia.  GENITOURINARY: No dysuria, no frequency, no hematuria, no incontinence  SKIN: No itching, no burning, no rashes, no lesions     PHYSICAL EXAM:  T(C): 37.3 (10-02-20 @ 11:06), Max: 37.3 (10-02-20 @ 05:14)  HR: 94 (10-02-20 @ 11:06) (72 - 94)  BP: 104/62 (10-02-20 @ 11:06) (95/62 - 112/62)  RR: 16 (10-02-20 @ 11:06) (16 - 18)  SpO2: 94% (10-02-20 @ 11:06) (92% - 97%)  Wt(kg): --  I&O's Summary    01 Oct 2020 07:01  -  02 Oct 2020 07:00  --------------------------------------------------------  IN: 0 mL / OUT: 760 mL / NET: -760 mL        Appearance:  No deformities, normal appearance	  HEENT:   Normal oral mucosa, PERRL, EOMI	  Neck: No jvd , no masses  Lymphatic: No  lymphadenopathy  Pulmonary: Lungs clear to auscultation and percussion  Cardiovascular: Normal S1 S2, No JVD, No murmur, 1+ edema	  Abdomen:  Soft, Non-tender, + BS  Skin: No rashes, No ecchymoses	  Extremities:  No clubbing or cyanosis, brace   MSK: Normal range of motion, no joint swelling    LABS:		  CARDIAC MARKERS:                         8.3    13.70 )-----------( 533      ( 02 Oct 2020 06:50 )             28.8   10-01    137  |  100  |  20  ----------------------------<  129<H>  4.3   |  28  |  0.78    Ca    8.0<L>      01 Oct 2020 16:54    TPro  6.4  /  Alb  2.7<L>  /  TBili  0.4  /  DBili  x   /  AST  16  /  ALT  16  /  AlkPhos  136<H>  10-01    proBNP:  Lipid Profile:  HgA1c:  TSH:      TELEMETRY: 	      QTC     ECG:  	   QTC         RADIOLOGY:   DIAGNOSTIC TESTING: [ ] Echocardiogram: [ ]  Catheterization: [ ] Stress Test:      ASSESSMENT/PLAN: 	  Severely reduced ejection fraction-Dyspnea is improved.  Mild edema. Continue metoprolol digoxin and spironolactone.    Coronary artery disease-the patient denies chest discomfort.  continue prasugrel and aspirin.     Ventiricular tachycardia/Defibrillator- asymptomatic. Short run of VT. ICD check shows nonsustained VT. No RX needed.     Hyponatremia- resolved.    Septic knee- as per ID and orthopedics.       Discussed with house staff.

## 2020-10-02 NOTE — PROGRESS NOTE ADULT - PROBLEM SELECTOR PLAN 8
Dr. Caldera evaluated the pt, no evidence of decompensated CHF.

## 2020-10-02 NOTE — PROGRESS NOTE ADULT - PROBLEM SELECTOR PROBLEM 7
Pure hypertriglyceridemia

## 2020-10-02 NOTE — PROGRESS NOTE ADULT - PROBLEM SELECTOR PLAN 7
on statin

## 2020-10-02 NOTE — PROGRESS NOTE ADULT - SUBJECTIVE AND OBJECTIVE BOX
Interval Events: Reviewed  Patient seen and examined at bedside.    Patient is a 65y old  Male who presents with a chief complaint of R Knee Swelling (02 Oct 2020 15:51)    he is in pain  PAST MEDICAL & SURGICAL HISTORY:  Diabetic neuropathy    STEMI (ST elevation myocardial infarction)    Diverticulitis    MRSA bacteremia    History of celiac disease    CHF (congestive heart failure)  EF ~ 25%    HTN (hypertension)    Diabetes  on insulin pump    Blood clot due to device, implant, or graft  was on blood thinners    HLD (hyperlipidemia)    Osteoarthritis    Atherosclerosis of coronary artery  CAD (coronary artery disease)    Status post percutaneous transluminal coronary angioplasty  in 2012    History of open reduction and internal fixation (ORIF) procedure    Surgery, elective  Right shoulder    Surgery, elective  right knee wound debridement    S/P TKR (total knee replacement), right  with infection Mrsa   per pt he was cleared from MRSA infection    S/P CABG x 1  2018    Stented coronary artery  10/18 heart attack  INFERIOR WALL MI    Other postprocedural status  Fixation hardware in foot LEFT        MEDICATIONS:  Pulmonary:    Antimicrobials:  rifAMPin 300 milliGRAM(s) Oral every 12 hours  vancomycin  IVPB 1250 milliGRAM(s) IV Intermittent every 24 hours    Anticoagulants:  aspirin enteric coated 81 milliGRAM(s) Oral two times a day  prasugrel 10 milliGRAM(s) Oral daily    Cardiac:  digoxin     Tablet 0.125 milliGRAM(s) Oral daily  metoprolol succinate ER 25 milliGRAM(s) Oral daily  spironolactone 25 milliGRAM(s) Oral daily  tamsulosin 0.4 milliGRAM(s) Oral at bedtime      Allergies    ACE inhibitors (Hives)  carvedilol (Other)  enalapril (Hives)  Entresto (Other)    Intolerances        Vital Signs Last 24 Hrs  T(C): 36.7 (02 Oct 2020 18:00), Max: 37.3 (02 Oct 2020 05:14)  T(F): 98.1 (02 Oct 2020 18:00), Max: 99.2 (02 Oct 2020 05:14)  HR: 88 (02 Oct 2020 18:00) (72 - 94)  BP: 118/67 (02 Oct 2020 18:00) (95/62 - 126/68)  BP(mean): --  RR: 17 (02 Oct 2020 18:00) (16 - 18)  SpO2: 96% (02 Oct 2020 18:00) (94% - 97%)    10-01 @ 07:01  -  10-02 @ 07:00  --------------------------------------------------------  IN: 0 mL / OUT: 760 mL / NET: -760 mL    10-02 @ 07:01  -  10-02 @ 18:55  --------------------------------------------------------  IN: 0 mL / OUT: 240 mL / NET: -240 mL          Review of Systems:   •	General: negative  •	Skin/Breast: negative  •	Ophthalmologic: negative  •	ENMT: negative  •	Respiratory and Thorax: negative  •	Cardiovascular: negative  •	Gastrointestinal: negative  •	Genitourinary: negative  •	Musculoskeletal: negative  •	Neurological: negative  •	Psychiatric: negative  •	Hematology/Lymphatics: negative  •	Endocrine: negative  •	Allergic/Immunologic: negative    Physical Exam:   • Constitutional:	Well-developed, well nourished  • Eyes:	EOMI; PERRL; no drainage or redness  • ENMT:	No oral lesions; no gross abnormalities  • Neck	No bruits; no thyromegaly or nodules  • Breasts:	not examined  • Back:	No deformity or limitation of movement  • Respiratory:	Breath Sounds equal & clear to percussion & auscultation, no accessory muscle use  • Cardiovascular:	Regular rate & rhythm, normal S1, S2; no murmurs, gallops or rubs; no S3, S4  • Gastrointestinal:	Soft, non-tender, no hepatosplenomegaly, normal bowel sounds  • Genitourinary:	not examined  • Rectal: not examined  • Extremities:	No cyanosis, clubbing or edema  • Vascular:	Equal and normal pulses (carotid, femoral, dorsalis pedis)  • Neurologica:l	not examined  • Skin:	No lesions; no rash  • Lymph Nodes:	No lymphadedenopathy  • Musculoskeletal:	No joint pain, swelling or deformity; no limitation of movement        LABS:      CBC Full  -  ( 02 Oct 2020 06:50 )  WBC Count : 13.70 K/uL  RBC Count : 3.86 M/uL  Hemoglobin : 8.3 g/dL  Hematocrit : 28.8 %  Platelet Count - Automated : 533 K/uL  Mean Cell Volume : 74.6 fl  Mean Cell Hemoglobin : 21.5 pg  Mean Cell Hemoglobin Concentration : 28.8 gm/dL  Auto Neutrophil # : x  Auto Lymphocyte # : x  Auto Monocyte # : x  Auto Eosinophil # : x  Auto Basophil # : x  Auto Neutrophil % : x  Auto Lymphocyte % : x  Auto Monocyte % : x  Auto Eosinophil % : x  Auto Basophil % : x    10-01    137  |  100  |  20  ----------------------------<  129<H>  4.3   |  28  |  0.78    Ca    8.0<L>      01 Oct 2020 16:54    TPro  6.4  /  Alb  2.7<L>  /  TBili  0.4  /  DBili  x   /  AST  16  /  ALT  16  /  AlkPhos  136<H>  10-01                        RADIOLOGY & ADDITIONAL STUDIES (The following images were personally reviewed):  Loja:                                     No  Urine output:                       adequate  DVT prophylaxis:                 Yes  Flattus:                                  Yes  Bowel movement:              No

## 2020-10-02 NOTE — PROGRESS NOTE ADULT - SUBJECTIVE AND OBJECTIVE BOX
POST OPERATIVE DAY #  SUBJECTIVE: Patient seen and examined.  Pt complaining of pain that is not well controlled overnight. Says brace is uncomfortable but understands he needs to keep it  Denies chest pain/SOB/dizziness/n/v/HA     OBJECTIVE:     Vital Signs Last 24 Hrs  T(C): 37.3 (02 Oct 2020 05:14), Max: 37.3 (02 Oct 2020 05:14)  T(F): 99.2 (02 Oct 2020 05:14), Max: 99.2 (02 Oct 2020 05:14)  HR: 72 (02 Oct 2020 05:14) (72 - 91)  BP: 95/62 (02 Oct 2020 05:14) (95/55 - 112/62)  BP(mean): --  RR: 18 (02 Oct 2020 05:14) (16 - 19)  SpO2: 96% (02 Oct 2020 05:14) (92% - 97%)    PE: Comfortable. NAD. Alert, oriented.          Dressing: clean/dry/intact BLE, STSG dressings clean and dry          Sensation: intact to light touch to patient's baseline BLE          Motor exam:  firing TA/GS BLE,  wiggles toes          Skin warm, well-perfused; capillary refill brisk             ASSESSMENT AND PLAN: 65M s/p right knee revision and antibiotic spacer placement with gastroc flap, LLE STSG now on IV abx, awaiting placement to rehab  1. F/u Psych recs  Appreciate Med & Cardio (Dr. Kwok, Dr. Caldera) recs- no evidence of decompensated CHF- fluids discontinued for SOB, resolving with lasix pushes. Labs reviewed.   2. Analgesic pain control; topical lidocaine for donor sites helpful  3. DVT prophylaxis: SCDs  4. Weight Bearing Status:  WBAT   5. Disposition: [facility] pending Auth

## 2020-10-02 NOTE — PROGRESS NOTE ADULT - NSHPATTENDINGPLANDISCUSS_GEN_ALL_CORE
primary team
primary team
as above
ortho

## 2020-10-02 NOTE — PROGRESS NOTE ADULT - SUBJECTIVE AND OBJECTIVE BOX
S: Resting comfortably in bed. Notes improvement w/ Aquaphor over wound. Pain well controlled on pain medication No acute complaints    O:   Vital Signs Last 24 Hrs  T(C): 37.3 (02 Oct 2020 05:14), Max: 37.3 (02 Oct 2020 05:14)  T(F): 99.2 (02 Oct 2020 05:14), Max: 99.2 (02 Oct 2020 05:14)  HR: 72 (02 Oct 2020 05:14) (72 - 91)  BP: 95/62 (02 Oct 2020 05:14) (95/55 - 112/62)  BP(mean): --  RR: 18 (02 Oct 2020 05:14) (16 - 19)  SpO2: 96% (02 Oct 2020 05:14) (92% - 97%)    OUT:    Bulb (mL): 60 mL    Voided (mL): 700 mL  Total OUT: 760 mL    Total NET: -760 mL    Physical Exam  General: NAD, resting comfortably in bed  C/V: NSR  Pulm: Nonlabored breathing, no respiratory distress  Abd: soft, non-tender, non-distended.  Extrem: WWP, no edema, LLE: shin wound w/ good granulation, no erythema or drainage  RLE: ace wrap removed, underlying graft healthy appearing, non ecchymotic, no drainage,  Neuro: A/O x 3, CNs II-XII grossly intact, no focal deficits, normal sensation of lower extremities  Pulses: palpable distal pulses bilaterally     LABS:                        8.3    13.70 )-----------( 533      ( 02 Oct 2020 06:50 )             28.8     10-01    137  |  100  |  20  ----------------------------<  129<H>  4.3   |  28  |  0.78    Ca    8.0<L>      01 Oct 2020 16:54    TPro  6.4  /  Alb  2.7<L>  /  TBili  0.4  /  DBili  x   /  AST  16  /  ALT  16  /  AlkPhos  136<H>  10-01       S: Resting comfortably in bed. Notes improvement w/ Aquaphor over wound. Pain well controlled on pain medication No acute complaints    O:   Vital Signs Last 24 Hrs  T(C): 37.3 (02 Oct 2020 05:14), Max: 37.3 (02 Oct 2020 05:14)  T(F): 99.2 (02 Oct 2020 05:14), Max: 99.2 (02 Oct 2020 05:14)  HR: 72 (02 Oct 2020 05:14) (72 - 91)  BP: 95/62 (02 Oct 2020 05:14) (95/55 - 112/62)  BP(mean): --  RR: 18 (02 Oct 2020 05:14) (16 - 19)  SpO2: 96% (02 Oct 2020 05:14) (92% - 97%)    OUT:    Bulb (mL): 60 mL    Voided (mL): 700 mL  Total OUT: 760 mL    Total NET: -760 mL    Physical Exam  General: NAD, resting comfortably in bed  C/V: NSR  Pulm: Nonlabored breathing, no respiratory distress  Abd: soft, non-tender, non-distended.  Extrem: WWP, no edema, LLE: shin wound w/ good granulation, no erythema or drainage  RLE: ace wrap removed, underlying graft healthy appearing, non ecchymotic, no drainage, drain ss  Neuro: A/O x 3, CNs II-XII grossly intact, no focal deficits, normal sensation of lower extremities  Pulses: palpable distal pulses bilaterally     LABS:                        8.3    13.70 )-----------( 533      ( 02 Oct 2020 06:50 )             28.8     10-01    137  |  100  |  20  ----------------------------<  129<H>  4.3   |  28  |  0.78    Ca    8.0<L>      01 Oct 2020 16:54    TPro  6.4  /  Alb  2.7<L>  /  TBili  0.4  /  DBili  x   /  AST  16  /  ALT  16  /  AlkPhos  136<H>  10-01

## 2020-10-02 NOTE — PROGRESS NOTE ADULT - PROBLEM SELECTOR PROBLEM 5
MRSA bacteremia

## 2020-10-02 NOTE — PROGRESS NOTE ADULT - PROBLEM SELECTOR PROBLEM 3
Type 1 diabetes mellitus without complication

## 2020-10-02 NOTE — PROGRESS NOTE ADULT - PROBLEM SELECTOR PROBLEM 6
Essential hypertension

## 2020-10-02 NOTE — PROGRESS NOTE ADULT - PROBLEM SELECTOR PLAN 3
Continue insulin sliding scale. Maintain her blood sugar in the range between 140- 180, and not below 70. Monitor for hypoglycemia. Monitor blood sugar with advancing diet. Hold oral hypoglycemic agents during hospitalization. Adjust insulin..  BS is uncontrolled. Started Lantus and Pre-meal insulin.  BS better controlled and increased lantus to 20 units at night
Continue insulin sliding scale. Maintain her blood sugar in the range between 140- 180, and not below 70. Monitor for hypoglycemia. Monitor blood sugar with advancing diet. Hold oral hypoglycemic agents during hospitalization. Adjust insulin..  BS is uncontrolled. Started Lantus and Pre-meal insulin
Continue insulin sliding scale. Maintain her blood sugar in the range between 140- 180, and not below 70. Monitor for hypoglycemia. Monitor blood sugar with advancing diet. Hold oral hypoglycemic agents during hospitalization. Adjust insulin..  BS is uncontrolled. Started Lantus and Pre-meal insulin.  Increased lantus and BS is better controlled on20 U at night.  BS is on low normal and will hold on increase to 20 U
Continue insulin sliding scale. Maintain her blood sugar in the range between 140- 180, and not below 70. Monitor for hypoglycemia. Monitor blood sugar with advancing diet. Hold oral hypoglycemic agents during hospitalization. Adjust insulin..  BS is uncontrolled. Started Lantus and Pre-meal insulin.  BS better  controlled and off pre-meal insulin
Continue insulin sliding scale. Maintain her blood sugar in the range between 140- 180, and not below 70. Monitor for hypoglycemia. Monitor blood sugar with advancing diet. Hold oral hypoglycemic agents during hospitalization. Adjust insulin..  BS is uncontrolled. Started Lantus and Pre-meal insulin.  BS better  strictly controlled and dc oppremeal
Continue insulin sliding scale. Maintain her blood sugar in the range between 140- 180, and not below 70. Monitor for hypoglycemia. Monitor blood sugar with advancing diet. Hold oral hypoglycemic agents during hospitalization. Adjust insulin..  BS is uncontrolled. Started Lantus and Pre-meal insulin.  BS better controlled and increased lantus to 20 units at night
Continue insulin sliding scale. Maintain her blood sugar in the range between 140- 180, and not below 70. Monitor for hypoglycemia. Monitor blood sugar with advancing diet. Hold oral hypoglycemic agents during hospitalization. Adjust insulin..  BS is uncontrolled. Started Lantus and Pre-meal insulin.  BS is uncontrolled and restarted pre-meal
Continue insulin sliding scale. Maintain her blood sugar in the range between 140- 180, and not below 70. Monitor for hypoglycemia. Monitor blood sugar with advancing diet. Hold oral hypoglycemic agents during hospitalization. Adjust insulin..  BS is uncontrolled. Started Lantus and Pre-meal insulin.  BS uncontrolled and incraese lantus to 20 units at night
Continue insulin sliding scale. Maintain her blood sugar in the range between 140- 180, and not below 70. Monitor for hypoglycemia. Monitor blood sugar with advancing diet. Hold oral hypoglycemic agents during hospitalization. Adjust insulin..  BS is uncontrolled. Started Lantus and Pre-meal insulin.  Increased lantus and BS is better controlled
Continue insulin sliding scale. Maintain her blood sugar in the range between 140- 180, and not below 70. Monitor for hypoglycemia. Monitor blood sugar with advancing diet. Hold oral hypoglycemic agents during hospitalization. Adjust insulin..  BS is uncontrolled. Started Lantus and Pre-meal insulin.  Increased lantus and BS is better controlled on20 U at night.
Continue insulin sliding scale. Maintain her blood sugar in the range between 140- 180, and not below 70. Monitor for hypoglycemia. Monitor blood sugar with advancing diet. Hold oral hypoglycemic agents during hospitalization. Adjust insulin..  BS is uncontrolled. Started Lantus and Pre-meal insulin.  Increased lantus and BS is better controlled on20 U at night.  BS is on low normal and will hold on increase to 20 U
Continue insulin sliding scale. Maintain her blood sugar in the range between 140- 180, and not below 70. Monitor for hypoglycemia. Monitor blood sugar with advancing diet. Hold oral hypoglycemic agents during hospitalization. Adjust insulin..  BS is uncontrolled. Started Lantus and Pre-meal insulin.  Increased lantus and BS is better controlled on20 U at night.  Give half lantus today as he NO
Continue insulin sliding scale. Maintain her blood sugar in the range between 140- 180, and not below 70. Monitor for hypoglycemia. Monitor blood sugar with advancing diet. Hold oral hypoglycemic agents during hospitalization. Adjust insulin..  BS is uncontrolled. Started Lantus and Pre-meal insulin.  Increased lantus and BS is better controlled to 20 U at night
Continue insulin sliding scale. Maintain her blood sugar in the range between 140- 180, and not below 70. Monitor for hypoglycemia. Monitor blood sugar with advancing diet. Hold oral hypoglycemic agents during hospitalization. Adjust insulin..  BS is uncontrolled. Started Lantus and Pre-meal insulin.  BS better controlled and increased lantus to 20 units at night
Continue insulin sliding scale. Maintain her blood sugar in the range between 140- 180, and not below 70. Monitor for hypoglycemia. Monitor blood sugar with advancing diet. Hold oral hypoglycemic agents during hospitalization. Adjust insulin..  BS is uncontrolled. Started Lantus and Pre-meal insulin.  Increase lantus

## 2020-10-02 NOTE — PROGRESS NOTE ADULT - PROBLEM SELECTOR PLAN 6
stable.
stable
stable.
stable.
stable.  he was hypotensive this morning with no evidence of sepsis and repsonded to fluid bolus
stable
stable
stable.
stable.  he was hypotensive this morning with no evidence of sepsis and repsonded to fluid bolus
stable.
stable

## 2020-10-02 NOTE — PROGRESS NOTE ADULT - SUBJECTIVE AND OBJECTIVE BOX
Pain Management Progress Note - [facility] [phone]      HPI: Patient seen and examined today. Patient with a history of knee pain, diabetic neuropathy, CHF, HTN, MRSA bacteremia, diverticulitis, COPD, hyperkalemia, chronic systolic CHF, osteoarthritis, s/p R Revision TKA and antibiotic spacer by Dr. Castro on 7/22, now presenting with R knee pain and swelling x3 days, now s/p R knee spacer exchange, gastro flap with skin graft.  Patient reports R knee pain, LLE pain, patient reports moderate pain relief with current pain medication regimen. Patient Axox3, denies any itchiness from Morphine Po. reviewed pain medication regimen with patient at bedside.  Patient reports good appetite, last bm yesterday.           Pain is _x__ sharp ____dull ___burning _x__achy ___ Intensity: ____ mild _x__mod x__severe     Location _x___surgical site ____cervical _____lumbar ____abd ____upper ext__x__lower ext    Worse with _x___activity _x___movement _____physical therapy___ Rest    Improved with _x___medication __x__rest ____physical therapy      morphine ER Tablet  AQUAPHOR (petrolatum Ointment)  furosemide   Injectable  morphine  - Injectable  morphine  - Injectable  vancomycin  IVPB  lactated ringers.  ketorolac   Injectable  metoclopramide Injectable  simethicone  dextrose 5%.  dextrose 40% Gel  glucagon  Injectable  mupirocin 2% Ointment  morphine  IR  morphine  IR  BACItracin   Ointment  mupirocin 2% Ointment  ketorolac   Injectable  gabapentin  morphine  - Injectable  ketorolac   Injectable  potassium phosphate / sodium phosphate Powder (PHOS-NaK)  insulin glargine Injectable (LANTUS)  chlorhexidine 2% Cloths  sodium chloride 0.9% lock flush  gabapentin  gabapentin  sertraline  rosuvastatin  senna  polyethylene glycol 3350  nystatin Cream  potassium phosphate / sodium phosphate Powder (PHOS-NaK)  insulin glargine Injectable (LANTUS)  insulin lispro Injectable (HumaLOG)  cyclobenzaprine  aspirin enteric coated  diphenhydrAMINE  tamsulosin  HYDROmorphone  Injectable  digoxin     Tablet  HYDROmorphone PCA (1 mG/mL)  prasugrel  aspirin enteric coated  ondansetron Injectable  naloxone Injectable  HYDROmorphone PCA (1 mG/mL)  spironolactone  digoxin     Tablet  glucagon  Injectable  dextrose 50% Injectable  dextrose 50% Injectable  dextrose 50% Injectable  dextrose 40% Gel  dextrose 5%.  insulin lispro (HumaLOG) corrective regimen sliding scale  magnesium sulfate  IVPB  metoprolol succinate ER  cefepime   IVPB  rifAMPin  DAPTOmycin IVPB  morphine  - Injectable  gabapentin  cyclobenzaprine  morphine  IR  morphine  IR  lactated ringers.  HYDROmorphone  Injectable  BUpivacaine liposome 1.3% Injectable (no eMAR)  morphine  IR  morphine  IR  morphine  - Injectable  lactated ringers.  BACItracin   Ointment  (ADM OVERRIDE)  mupirocin 2% Ointment  nystatin Cream  morphine  IR  morphine  IR  nystatin Powder  morphine  - Injectable  chlorhexidine 2% Cloths  insulin glargine Injectable (LANTUS)  insulin glargine Injectable (LANTUS)  insulin glargine Injectable (LANTUS)  pantoprazole  Injectable  metoclopramide Injectable  acetaminophen   Tablet ..  diphenhydrAMINE   Injectable  lactated ringers.  levothyroxine  sodium chloride 0.9% Bolus  gabapentin  gabapentin  gabapentin  sertraline      ROS: Const:  -___febrile   Eyes:___ENT:___CV: __-_chest pain  Resp: __-__sob  GI:_-__nausea __-_vomiting _-_ abd pain ___npo ___clears _x_full diet __bm  :___ Musk: _x__pain _-__spasm  Skin:___ Neuro:  _-__tyvvjhns_-__piifnmxaa_-__ numbness _-__weakness __x_paresth  Psych:_-_anxiety  Endo:___ Heme:___Allergy:_________, _x__all others reviewed and negative      PAST MEDICAL & SURGICAL HISTORY:  Diabetic neuropathy  STEMI (ST elevation myocardial infarction)  Diverticulitis  MRSA bacteremia  History of celiac disease  CHF (congestive heart failure): EF ~ 25%  HTN (hypertension)  Diabetes: on insulin pump  Blood clot due to device, implant, or graft: was on blood thinners  HLD (hyperlipidemia)  Osteoarthritis  Atherosclerosis of coronary artery: CAD (coronary artery disease)  Status post percutaneous transluminal coronary angioplasty: in 2012  History of open reduction and internal fixation (ORIF) procedure  Surgery, elective: Right shoulder  Surgery, elective: right knee wound debridement  S/P TKR (total knee replacement), right: with infection Mrsa   per pt he was cleared from MRSA infection  S/P CABG x 1: 2018  Stented coronary artery: 10/18 heart attack  INFERIOR WALL MI  Other postprocedural status: Fixation hardware in foot LEFT        10-01 @ 16:5496 mL/min/1.73M2          Hemoglobin: 8.3 g/dL (10-02 @ 06:50)  Hemoglobin: 8.1 g/dL (10-01 @ 16:54)        T(C): 37.3 (10-02-20 @ 11:06), Max: 37.3 (10-02-20 @ 05:14)  HR: 94 (10-02-20 @ 11:06) (72 - 94)  BP: 104/62 (10-02-20 @ 11:06) (95/55 - 112/62)  RR: 16 (10-02-20 @ 11:06) (16 - 18)  SpO2: 94% (10-02-20 @ 11:06) (92% - 97%)  Wt(kg): --        PHYSICAL EXAM:  Gen Appearance: x___no acute distress _x__appropriate        Neuro: _x__SILT feet____ EOM Intact Psych: AAOX__3, x___mood/affect appropriate        Eyes: __x_conjunctiva WNL  __x__ Pupils equal and round        ENT: x___ears and nose atraumatic_x__ Hearing grossly intact        Neck: _x__trachea midline, no visible masses ___thyroid without palpable mass    Resp: _x__Nml WOB____No tactile fremitus ___clear to auscultation    Cardio: ___extremities free from edema __x__pedal pulses palpable    GI/Abdomen: __x_soft ___x__ Nontender___x___Nondistended_____HSM    Lymphatic: ___no palpable nodes in neck  ___no palpable nodes calves and feet    Skin/Wound: ___Incision, _x__Dressing c/d/i,   ____surrounding tissues soft,  ___drain/chest tube present____    Muscular: EHL _4__/5  Gastrocnemius_4__/5    ___absent clubbing/cyanosis          ASSESSMENT: This is a 65y old Male with a history of knee pain, diabetic neuropathy, CHF, HTN, MRSA bacteremia, diverticulitis, COPD, hyperkalemia, chronic systolic CHF, osteoarthritis,  s/p R Revision TKA and antibiotic spacer by Dr. Castro on 7/22, now presenting with R knee pain and swelling x3 days, now s/p R knee spacer exchange, gastro flap with skin graft, pain controlled with current pain medication regimen.      Recommended Treatment PLAN:  1. Morphine IR 30mg Po Q4h prn moderate to severe pain  2. Flexeril 5mg PO Q8h prn muscle spasms  3. Gabapentin 300mg PO TID  4. Morphine 4mg IVP Q4h prn breakthrough pain  5. Mscontin 15mg PO ER Daily  Plan discussed with Dr. Max

## 2020-10-02 NOTE — PROGRESS NOTE ADULT - MINUTES
25
30
25
30
25
35
25

## 2020-10-02 NOTE — PROGRESS NOTE ADULT - PROBLEM SELECTOR PROBLEM 2
Pyogenic arthritis of right knee joint, due to unspecified organism

## 2020-10-02 NOTE — PROGRESS NOTE ADULT - PROBLEM SELECTOR PROBLEM 1
Postoperative state

## 2020-10-02 NOTE — PROGRESS NOTE ADULT - PROBLEM SELECTOR PROBLEM 4
Diabetic polyneuropathy associated with type 1 diabetes mellitus

## 2020-10-02 NOTE — PROGRESS NOTE ADULT - PROBLEM SELECTOR PLAN 10
Monitor hemoglobin. Hold transfusion unless hemoglobin is 7, 8 in patient with coronary artery disease, hemodynamic instability such as tachycardia/hypotension, or there is evidence of acute blood loss.  Anemia is due to postoperative blood loss.  Hb slowly increasing
Monitor hemoglobin. Hold transfusion unless hemoglobin is 7, 8 in patient with coronary artery disease, hemodynamic instability such as tachycardia/hypotension, or there is evidence of acute blood loss.  Anemia is due to postoperative blood loss.  May need transfusion
Monitor hemoglobin. Hold transfusion unless hemoglobin is 7, 8 in patient with coronary artery disease, hemodynamic instability such as tachycardia/hypotension, or there is evidence of acute blood loss.  Anemia is due to postoperative blood loss.  Hb slowly increasing
Monitor hemoglobin. Hold transfusion unless hemoglobin is 7, 8 in patient with coronary artery disease, hemodynamic instability such as tachycardia/hypotension, or there is evidence of acute blood loss.  Anemia is due to postoperative blood loss.  May need transfusion
Monitor hemoglobin. Hold transfusion unless hemoglobin is 7, 8 in patient with coronary artery disease, hemodynamic instability such as tachycardia/hypotension, or there is evidence of acute blood loss.  Anemia is due to postoperative blood loss.  May need transfusion
Monitor hemoglobin. Hold transfusion unless hemoglobin is 7, 8 in patient with coronary artery disease, hemodynamic instability such as tachycardia/hypotension, or there is evidence of acute blood loss.  Anemia is due to postoperative blood loss.  Hb slowly increasing
Monitor hemoglobin. Hold transfusion unless hemoglobin is 7, 8 in patient with coronary artery disease, hemodynamic instability such as tachycardia/hypotension, or there is evidence of acute blood loss.  Anemia is due to postoperative blood loss.  May need transfusion

## 2020-10-02 NOTE — PROGRESS NOTE ADULT - PROBLEM SELECTOR PLAN 5
resolved and repeat culture negative

## 2020-10-03 ENCOUNTER — TRANSCRIPTION ENCOUNTER (OUTPATIENT)
Age: 65
End: 2020-10-03

## 2020-10-03 ENCOUNTER — INPATIENT (INPATIENT)
Facility: HOSPITAL | Age: 65
LOS: 9 days | Discharge: NURSING, REHAB OR LONG-TERM CARE | DRG: 948 | End: 2020-10-13
Attending: ORTHOPAEDIC SURGERY | Admitting: ORTHOPAEDIC SURGERY
Payer: COMMERCIAL

## 2020-10-03 VITALS
OXYGEN SATURATION: 94 % | HEART RATE: 81 BPM | DIASTOLIC BLOOD PRESSURE: 55 MMHG | TEMPERATURE: 98 F | SYSTOLIC BLOOD PRESSURE: 101 MMHG | RESPIRATION RATE: 16 BRPM

## 2020-10-03 VITALS
OXYGEN SATURATION: 96 % | RESPIRATION RATE: 95 BRPM | DIASTOLIC BLOOD PRESSURE: 63 MMHG | HEIGHT: 74 IN | SYSTOLIC BLOOD PRESSURE: 110 MMHG | TEMPERATURE: 99 F | HEART RATE: 95 BPM | WEIGHT: 190.04 LBS

## 2020-10-03 DIAGNOSIS — L97.519 NON-PRESSURE CHRONIC ULCER OF OTHER PART OF RIGHT FOOT WITH UNSPECIFIED SEVERITY: ICD-10-CM

## 2020-10-03 DIAGNOSIS — Z98.890 OTHER SPECIFIED POSTPROCEDURAL STATES: Chronic | ICD-10-CM

## 2020-10-03 DIAGNOSIS — E87.1 HYPO-OSMOLALITY AND HYPONATREMIA: ICD-10-CM

## 2020-10-03 DIAGNOSIS — E10.621 TYPE 1 DIABETES MELLITUS WITH FOOT ULCER: ICD-10-CM

## 2020-10-03 DIAGNOSIS — Z41.9 ENCOUNTER FOR PROCEDURE FOR PURPOSES OTHER THAN REMEDYING HEALTH STATE, UNSPECIFIED: Chronic | ICD-10-CM

## 2020-10-03 DIAGNOSIS — T84.53XA INFECTION AND INFLAMMATORY REACTION DUE TO INTERNAL RIGHT KNEE PROSTHESIS, INITIAL ENCOUNTER: ICD-10-CM

## 2020-10-03 DIAGNOSIS — I50.22 CHRONIC SYSTOLIC (CONGESTIVE) HEART FAILURE: ICD-10-CM
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
## 2020-10-03 DIAGNOSIS — E78.5 HYPERLIPIDEMIA, UNSPECIFIED: ICD-10-CM

## 2020-10-03 DIAGNOSIS — M19.90 UNSPECIFIED OSTEOARTHRITIS, UNSPECIFIED SITE: ICD-10-CM

## 2020-10-03 DIAGNOSIS — Z88.8 ALLERGY STATUS TO OTHER DRUGS, MEDICAMENTS AND BIOLOGICAL SUBSTANCES STATUS: ICD-10-CM

## 2020-10-03 DIAGNOSIS — Z95.810 PRESENCE OF AUTOMATIC (IMPLANTABLE) CARDIAC DEFIBRILLATOR: ICD-10-CM

## 2020-10-03 DIAGNOSIS — M00.061 STAPHYLOCOCCAL ARTHRITIS, RIGHT KNEE: ICD-10-CM

## 2020-10-03 DIAGNOSIS — I25.10 ATHEROSCLEROTIC HEART DISEASE OF NATIVE CORONARY ARTERY WITHOUT ANGINA PECTORIS: ICD-10-CM

## 2020-10-03 DIAGNOSIS — Z84.89 FAMILY HISTORY OF OTHER SPECIFIED CONDITIONS: ICD-10-CM

## 2020-10-03 DIAGNOSIS — E10.40 TYPE 1 DIABETES MELLITUS WITH DIABETIC NEUROPATHY, UNSPECIFIED: ICD-10-CM

## 2020-10-03 DIAGNOSIS — L97.529 NON-PRESSURE CHRONIC ULCER OF OTHER PART OF LEFT FOOT WITH UNSPECIFIED SEVERITY: ICD-10-CM

## 2020-10-03 DIAGNOSIS — Z98.890 OTHER SPECIFIED POSTPROCEDURAL STATES: ICD-10-CM

## 2020-10-03 DIAGNOSIS — Z82.49 FAMILY HISTORY OF ISCHEMIC HEART DISEASE AND OTHER DISEASES OF THE CIRCULATORY SYSTEM: ICD-10-CM

## 2020-10-03 DIAGNOSIS — Z96.41 PRESENCE OF INSULIN PUMP (EXTERNAL) (INTERNAL): ICD-10-CM

## 2020-10-03 DIAGNOSIS — Z96.651 PRESENCE OF RIGHT ARTIFICIAL KNEE JOINT: Chronic | ICD-10-CM

## 2020-10-03 DIAGNOSIS — M25.561 PAIN IN RIGHT KNEE: ICD-10-CM
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
## 2020-10-03 DIAGNOSIS — Z95.1 PRESENCE OF AORTOCORONARY BYPASS GRAFT: ICD-10-CM

## 2020-10-03 DIAGNOSIS — G89.18 OTHER ACUTE POSTPROCEDURAL PAIN: ICD-10-CM
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
## 2020-10-03 DIAGNOSIS — B95.62 METHICILLIN RESISTANT STAPHYLOCOCCUS AUREUS INFECTION AS THE CAUSE OF DISEASES CLASSIFIED ELSEWHERE: ICD-10-CM

## 2020-10-03 DIAGNOSIS — Z95.5 PRESENCE OF CORONARY ANGIOPLASTY IMPLANT AND GRAFT: Chronic | ICD-10-CM

## 2020-10-03 DIAGNOSIS — D62 ACUTE POSTHEMORRHAGIC ANEMIA: ICD-10-CM

## 2020-10-03 DIAGNOSIS — I47.2 VENTRICULAR TACHYCARDIA: ICD-10-CM

## 2020-10-03 DIAGNOSIS — F32.9 MAJOR DEPRESSIVE DISORDER, SINGLE EPISODE, UNSPECIFIED: ICD-10-CM

## 2020-10-03 DIAGNOSIS — I25.2 OLD MYOCARDIAL INFARCTION: ICD-10-CM

## 2020-10-03 DIAGNOSIS — I11.0 HYPERTENSIVE HEART DISEASE WITH HEART FAILURE: ICD-10-CM

## 2020-10-03 DIAGNOSIS — Z95.1 PRESENCE OF AORTOCORONARY BYPASS GRAFT: Chronic | ICD-10-CM

## 2020-10-03 DIAGNOSIS — E44.0 MODERATE PROTEIN-CALORIE MALNUTRITION: ICD-10-CM

## 2020-10-03 LAB
ALBUMIN SERPL ELPH-MCNC: 2.8 G/DL — LOW (ref 3.3–5)
ALP SERPL-CCNC: 175 U/L — HIGH (ref 40–120)
ALT FLD-CCNC: SIGNIFICANT CHANGE UP (ref 10–45)
ANION GAP SERPL CALC-SCNC: 11 MMOL/L — SIGNIFICANT CHANGE UP (ref 5–17)
AST SERPL-CCNC: SIGNIFICANT CHANGE UP (ref 10–40)
BASOPHILS # BLD AUTO: 0.18 K/UL — SIGNIFICANT CHANGE UP (ref 0–0.2)
BASOPHILS NFR BLD AUTO: 1.7 % — SIGNIFICANT CHANGE UP (ref 0–2)
BILIRUB SERPL-MCNC: 0.5 MG/DL — SIGNIFICANT CHANGE UP (ref 0.2–1.2)
BUN SERPL-MCNC: 18 MG/DL — SIGNIFICANT CHANGE UP (ref 7–23)
CALCIUM SERPL-MCNC: 8.6 MG/DL — SIGNIFICANT CHANGE UP (ref 8.4–10.5)
CHLORIDE SERPL-SCNC: 96 MMOL/L — SIGNIFICANT CHANGE UP (ref 96–108)
CO2 SERPL-SCNC: 27 MMOL/L — SIGNIFICANT CHANGE UP (ref 22–31)
CREAT SERPL-MCNC: 0.89 MG/DL — SIGNIFICANT CHANGE UP (ref 0.5–1.3)
CRP SERPL-MCNC: 8.04 MG/DL — HIGH (ref 0–0.4)
CULTURE RESULTS: SIGNIFICANT CHANGE UP
EOSINOPHIL # BLD AUTO: 0.1 K/UL — SIGNIFICANT CHANGE UP (ref 0–0.5)
EOSINOPHIL NFR BLD AUTO: 0.9 % — SIGNIFICANT CHANGE UP (ref 0–6)
ERYTHROCYTE [SEDIMENTATION RATE] IN BLOOD: 42 MM/HR — HIGH
GLUCOSE BLDC GLUCOMTR-MCNC: 103 MG/DL — HIGH (ref 70–99)
GLUCOSE BLDC GLUCOMTR-MCNC: 107 MG/DL — HIGH (ref 70–99)
GLUCOSE SERPL-MCNC: 231 MG/DL — HIGH (ref 70–99)
HCT VFR BLD CALC: 33.7 % — LOW (ref 39–50)
HGB BLD-MCNC: 9.5 G/DL — LOW (ref 13–17)
LYMPHOCYTES # BLD AUTO: 0.18 K/UL — LOW (ref 1–3.3)
LYMPHOCYTES # BLD AUTO: 1.7 % — LOW (ref 13–44)
MAGNESIUM SERPL-MCNC: 2 MG/DL — SIGNIFICANT CHANGE UP (ref 1.6–2.6)
MCHC RBC-ENTMCNC: 21.1 PG — LOW (ref 27–34)
MCHC RBC-ENTMCNC: 28.2 GM/DL — LOW (ref 32–36)
MCV RBC AUTO: 74.7 FL — LOW (ref 80–100)
MONOCYTES # BLD AUTO: 0.93 K/UL — HIGH (ref 0–0.9)
MONOCYTES NFR BLD AUTO: 8.7 % — SIGNIFICANT CHANGE UP (ref 2–14)
NEUTROPHILS # BLD AUTO: 9.23 K/UL — HIGH (ref 1.8–7.4)
NEUTROPHILS NFR BLD AUTO: 86.1 % — HIGH (ref 43–77)
PHOSPHATE SERPL-MCNC: 3.5 MG/DL — SIGNIFICANT CHANGE UP (ref 2.5–4.5)
PLATELET # BLD AUTO: 569 K/UL — HIGH (ref 150–400)
POTASSIUM SERPL-MCNC: SIGNIFICANT CHANGE UP (ref 3.5–5.3)
POTASSIUM SERPL-SCNC: SIGNIFICANT CHANGE UP (ref 3.5–5.3)
PROT SERPL-MCNC: 7.3 G/DL — SIGNIFICANT CHANGE UP (ref 6–8.3)
RBC # BLD: 4.51 M/UL — SIGNIFICANT CHANGE UP (ref 4.2–5.8)
RBC # FLD: 23.4 % — HIGH (ref 10.3–14.5)
SODIUM SERPL-SCNC: 134 MMOL/L — LOW (ref 135–145)
SPECIMEN SOURCE: SIGNIFICANT CHANGE UP
WBC # BLD: 10.72 K/UL — HIGH (ref 3.8–10.5)
WBC # FLD AUTO: 10.72 K/UL — HIGH (ref 3.8–10.5)

## 2020-10-03 PROCEDURE — 87086 URINE CULTURE/COLONY COUNT: CPT

## 2020-10-03 PROCEDURE — A9556: CPT

## 2020-10-03 PROCEDURE — 76000 FLUOROSCOPY <1 HR PHYS/QHP: CPT

## 2020-10-03 PROCEDURE — 73590 X-RAY EXAM OF LOWER LEG: CPT

## 2020-10-03 PROCEDURE — 86923 COMPATIBILITY TEST ELECTRIC: CPT

## 2020-10-03 PROCEDURE — 82607 VITAMIN B-12: CPT

## 2020-10-03 PROCEDURE — 88304 TISSUE EXAM BY PATHOLOGIST: CPT

## 2020-10-03 PROCEDURE — 73560 X-RAY EXAM OF KNEE 1 OR 2: CPT

## 2020-10-03 PROCEDURE — 88309 TISSUE EXAM BY PATHOLOGIST: CPT

## 2020-10-03 PROCEDURE — 83735 ASSAY OF MAGNESIUM: CPT

## 2020-10-03 PROCEDURE — 83880 ASSAY OF NATRIURETIC PEPTIDE: CPT

## 2020-10-03 PROCEDURE — 86901 BLOOD TYPING SEROLOGIC RH(D): CPT

## 2020-10-03 PROCEDURE — 87102 FUNGUS ISOLATION CULTURE: CPT

## 2020-10-03 PROCEDURE — 80053 COMPREHEN METABOLIC PANEL: CPT

## 2020-10-03 PROCEDURE — 86850 RBC ANTIBODY SCREEN: CPT

## 2020-10-03 PROCEDURE — 80202 ASSAY OF VANCOMYCIN: CPT

## 2020-10-03 PROCEDURE — 72170 X-RAY EXAM OF PELVIS: CPT

## 2020-10-03 PROCEDURE — 82962 GLUCOSE BLOOD TEST: CPT

## 2020-10-03 PROCEDURE — 89060 EXAM SYNOVIAL FLUID CRYSTALS: CPT

## 2020-10-03 PROCEDURE — 86780 TREPONEMA PALLIDUM: CPT

## 2020-10-03 PROCEDURE — 88311 DECALCIFY TISSUE: CPT

## 2020-10-03 PROCEDURE — 93971 EXTREMITY STUDY: CPT

## 2020-10-03 PROCEDURE — 88331 PATH CONSLTJ SURG 1 BLK 1SPC: CPT

## 2020-10-03 PROCEDURE — 74177 CT ABD & PELVIS W/CONTRAST: CPT

## 2020-10-03 PROCEDURE — 73552 X-RAY EXAM OF FEMUR 2/>: CPT

## 2020-10-03 PROCEDURE — 99284 EMERGENCY DEPT VISIT MOD MDM: CPT

## 2020-10-03 PROCEDURE — 78226 HEPATOBILIARY SYSTEM IMAGING: CPT

## 2020-10-03 PROCEDURE — 81001 URINALYSIS AUTO W/SCOPE: CPT

## 2020-10-03 PROCEDURE — 87116 MYCOBACTERIA CULTURE: CPT

## 2020-10-03 PROCEDURE — C1713: CPT

## 2020-10-03 PROCEDURE — 97161 PT EVAL LOW COMPLEX 20 MIN: CPT

## 2020-10-03 PROCEDURE — 84132 ASSAY OF SERUM POTASSIUM: CPT

## 2020-10-03 PROCEDURE — 88305 TISSUE EXAM BY PATHOLOGIST: CPT

## 2020-10-03 PROCEDURE — A9537: CPT

## 2020-10-03 PROCEDURE — 87075 CULTR BACTERIA EXCEPT BLOOD: CPT

## 2020-10-03 PROCEDURE — 96374 THER/PROPH/DIAG INJ IV PUSH: CPT

## 2020-10-03 PROCEDURE — 87186 SC STD MICRODIL/AGAR DIL: CPT

## 2020-10-03 PROCEDURE — 83605 ASSAY OF LACTIC ACID: CPT

## 2020-10-03 PROCEDURE — 73502 X-RAY EXAM HIP UNI 2-3 VIEWS: CPT

## 2020-10-03 PROCEDURE — 78802 RP LOCLZJ TUM WHBDY 1 D IMG: CPT

## 2020-10-03 PROCEDURE — 87040 BLOOD CULTURE FOR BACTERIA: CPT

## 2020-10-03 PROCEDURE — 87205 SMEAR GRAM STAIN: CPT

## 2020-10-03 PROCEDURE — 97110 THERAPEUTIC EXERCISES: CPT

## 2020-10-03 PROCEDURE — 85018 HEMOGLOBIN: CPT

## 2020-10-03 PROCEDURE — 82803 BLOOD GASES ANY COMBINATION: CPT

## 2020-10-03 PROCEDURE — 84295 ASSAY OF SERUM SODIUM: CPT

## 2020-10-03 PROCEDURE — 84443 ASSAY THYROID STIM HORMONE: CPT

## 2020-10-03 PROCEDURE — 82746 ASSAY OF FOLIC ACID SERUM: CPT

## 2020-10-03 PROCEDURE — 86140 C-REACTIVE PROTEIN: CPT

## 2020-10-03 PROCEDURE — 80162 ASSAY OF DIGOXIN TOTAL: CPT

## 2020-10-03 PROCEDURE — 99285 EMERGENCY DEPT VISIT HI MDM: CPT | Mod: 25

## 2020-10-03 PROCEDURE — 84100 ASSAY OF PHOSPHORUS: CPT

## 2020-10-03 PROCEDURE — 36573 INSJ PICC RS&I 5 YR+: CPT

## 2020-10-03 PROCEDURE — 97116 GAIT TRAINING THERAPY: CPT

## 2020-10-03 PROCEDURE — 82550 ASSAY OF CK (CPK): CPT

## 2020-10-03 PROCEDURE — 36430 TRANSFUSION BLD/BLD COMPNT: CPT

## 2020-10-03 PROCEDURE — P9016: CPT

## 2020-10-03 PROCEDURE — 85730 THROMBOPLASTIN TIME PARTIAL: CPT

## 2020-10-03 PROCEDURE — 87206 SMEAR FLUORESCENT/ACID STAI: CPT

## 2020-10-03 PROCEDURE — 93005 ELECTROCARDIOGRAM TRACING: CPT

## 2020-10-03 PROCEDURE — 82330 ASSAY OF CALCIUM: CPT

## 2020-10-03 PROCEDURE — 86900 BLOOD TYPING SEROLOGIC ABO: CPT

## 2020-10-03 PROCEDURE — 87070 CULTURE OTHR SPECIMN AEROBIC: CPT

## 2020-10-03 PROCEDURE — 83036 HEMOGLOBIN GLYCOSYLATED A1C: CPT

## 2020-10-03 PROCEDURE — 76705 ECHO EXAM OF ABDOMEN: CPT

## 2020-10-03 PROCEDURE — 97530 THERAPEUTIC ACTIVITIES: CPT

## 2020-10-03 PROCEDURE — 36415 COLL VENOUS BLD VENIPUNCTURE: CPT

## 2020-10-03 PROCEDURE — 85610 PROTHROMBIN TIME: CPT

## 2020-10-03 PROCEDURE — 85025 COMPLETE CBC W/AUTO DIFF WBC: CPT

## 2020-10-03 PROCEDURE — 85652 RBC SED RATE AUTOMATED: CPT

## 2020-10-03 PROCEDURE — 74160 CT ABDOMEN W/CONTRAST: CPT

## 2020-10-03 PROCEDURE — 87635 SARS-COV-2 COVID-19 AMP PRB: CPT

## 2020-10-03 PROCEDURE — 71045 X-RAY EXAM CHEST 1 VIEW: CPT

## 2020-10-03 PROCEDURE — 73620 X-RAY EXAM OF FOOT: CPT

## 2020-10-03 PROCEDURE — 97164 PT RE-EVAL EST PLAN CARE: CPT

## 2020-10-03 PROCEDURE — 78830 RP LOCLZJ TUM SPECT W/CT 1: CPT

## 2020-10-03 PROCEDURE — 85027 COMPLETE CBC AUTOMATED: CPT

## 2020-10-03 PROCEDURE — 80048 BASIC METABOLIC PNL TOTAL CA: CPT

## 2020-10-03 PROCEDURE — 87015 SPECIMEN INFECT AGNT CONCNTJ: CPT

## 2020-10-03 RX ORDER — CYCLOBENZAPRINE HYDROCHLORIDE 10 MG/1
5 TABLET, FILM COATED ORAL THREE TIMES A DAY
Refills: 0 | Status: DISCONTINUED | OUTPATIENT
Start: 2020-10-03 | End: 2020-10-13

## 2020-10-03 RX ORDER — SENNA PLUS 8.6 MG/1
2 TABLET ORAL AT BEDTIME
Refills: 0 | Status: DISCONTINUED | OUTPATIENT
Start: 2020-10-03 | End: 2020-10-13

## 2020-10-03 RX ORDER — MAGNESIUM HYDROXIDE 400 MG/1
30 TABLET, CHEWABLE ORAL DAILY
Refills: 0 | Status: DISCONTINUED | OUTPATIENT
Start: 2020-10-03 | End: 2020-10-13

## 2020-10-03 RX ORDER — GABAPENTIN 400 MG/1
300 CAPSULE ORAL THREE TIMES A DAY
Refills: 0 | Status: DISCONTINUED | OUTPATIENT
Start: 2020-10-03 | End: 2020-10-06

## 2020-10-03 RX ORDER — MORPHINE SULFATE 50 MG/1
30 CAPSULE, EXTENDED RELEASE ORAL EVERY 4 HOURS
Refills: 0 | Status: DISCONTINUED | OUTPATIENT
Start: 2020-10-03 | End: 2020-10-07

## 2020-10-03 RX ORDER — ONDANSETRON 8 MG/1
4 TABLET, FILM COATED ORAL EVERY 6 HOURS
Refills: 0 | Status: DISCONTINUED | OUTPATIENT
Start: 2020-10-03 | End: 2020-10-13

## 2020-10-03 RX ORDER — VANCOMYCIN HCL 1 G
1250 VIAL (EA) INTRAVENOUS EVERY 24 HOURS
Refills: 0 | Status: DISCONTINUED | OUTPATIENT
Start: 2020-10-03 | End: 2020-10-13

## 2020-10-03 RX ORDER — POLYETHYLENE GLYCOL 3350 17 G/17G
17 POWDER, FOR SOLUTION ORAL DAILY
Refills: 0 | Status: DISCONTINUED | OUTPATIENT
Start: 2020-10-03 | End: 2020-10-13

## 2020-10-03 RX ORDER — MORPHINE SULFATE 50 MG/1
15 CAPSULE, EXTENDED RELEASE ORAL DAILY
Refills: 0 | Status: DISCONTINUED | OUTPATIENT
Start: 2020-10-03 | End: 2020-10-06

## 2020-10-03 RX ORDER — MORPHINE SULFATE 50 MG/1
2 CAPSULE, EXTENDED RELEASE ORAL EVERY 4 HOURS
Refills: 0 | Status: DISCONTINUED | OUTPATIENT
Start: 2020-10-03 | End: 2020-10-05

## 2020-10-03 RX ORDER — MORPHINE SULFATE 50 MG/1
4 CAPSULE, EXTENDED RELEASE ORAL EVERY 4 HOURS
Refills: 0 | Status: DISCONTINUED | OUTPATIENT
Start: 2020-10-03 | End: 2020-10-05

## 2020-10-03 RX ORDER — OXYCODONE HYDROCHLORIDE 5 MG/1
10 TABLET ORAL EVERY 4 HOURS
Refills: 0 | Status: DISCONTINUED | OUTPATIENT
Start: 2020-10-03 | End: 2020-10-03

## 2020-10-03 RX ORDER — OXYCODONE HYDROCHLORIDE 5 MG/1
5 TABLET ORAL EVERY 4 HOURS
Refills: 0 | Status: DISCONTINUED | OUTPATIENT
Start: 2020-10-03 | End: 2020-10-03

## 2020-10-03 RX ORDER — ACETAMINOPHEN 500 MG
1000 TABLET ORAL EVERY 8 HOURS
Refills: 0 | Status: DISCONTINUED | OUTPATIENT
Start: 2020-10-03 | End: 2020-10-13

## 2020-10-03 RX ADMIN — Medication 0.12 MILLIGRAM(S): at 05:36

## 2020-10-03 RX ADMIN — Medication 1 APPLICATION(S): at 05:36

## 2020-10-03 RX ADMIN — ONDANSETRON 4 MILLIGRAM(S): 8 TABLET, FILM COATED ORAL at 21:13

## 2020-10-03 RX ADMIN — Medication 25 MILLIGRAM(S): at 05:36

## 2020-10-03 RX ADMIN — Medication 6 UNIT(S): at 08:01

## 2020-10-03 RX ADMIN — Medication 166.67 MILLIGRAM(S): at 21:05

## 2020-10-03 RX ADMIN — Medication 81 MILLIGRAM(S): at 05:36

## 2020-10-03 RX ADMIN — MORPHINE SULFATE 4 MILLIGRAM(S): 50 CAPSULE, EXTENDED RELEASE ORAL at 10:09

## 2020-10-03 RX ADMIN — GABAPENTIN 300 MILLIGRAM(S): 400 CAPSULE ORAL at 05:35

## 2020-10-03 RX ADMIN — MORPHINE SULFATE 2 MILLIGRAM(S): 50 CAPSULE, EXTENDED RELEASE ORAL at 20:47

## 2020-10-03 RX ADMIN — MORPHINE SULFATE 4 MILLIGRAM(S): 50 CAPSULE, EXTENDED RELEASE ORAL at 10:25

## 2020-10-03 RX ADMIN — CHLORHEXIDINE GLUCONATE 1 APPLICATION(S): 213 SOLUTION TOPICAL at 05:24

## 2020-10-03 RX ADMIN — MORPHINE SULFATE 2 MILLIGRAM(S): 50 CAPSULE, EXTENDED RELEASE ORAL at 19:29

## 2020-10-03 RX ADMIN — MORPHINE SULFATE 4 MILLIGRAM(S): 50 CAPSULE, EXTENDED RELEASE ORAL at 01:05

## 2020-10-03 RX ADMIN — SENNA PLUS 2 TABLET(S): 8.6 TABLET ORAL at 21:05

## 2020-10-03 RX ADMIN — GABAPENTIN 300 MILLIGRAM(S): 400 CAPSULE ORAL at 21:05

## 2020-10-03 RX ADMIN — MORPHINE SULFATE 4 MILLIGRAM(S): 50 CAPSULE, EXTENDED RELEASE ORAL at 05:54

## 2020-10-03 RX ADMIN — SPIRONOLACTONE 25 MILLIGRAM(S): 25 TABLET, FILM COATED ORAL at 05:36

## 2020-10-03 RX ADMIN — NYSTATIN CREAM 1 APPLICATION(S): 100000 CREAM TOPICAL at 05:36

## 2020-10-03 NOTE — PROGRESS NOTE ADULT - REASON FOR ADMISSION
R Knee Swelling

## 2020-10-03 NOTE — ED PROVIDER NOTE - CLINICAL SUMMARY MEDICAL DECISION MAKING FREE TEXT BOX
65 year-old man with PMH of CAD (s/p CABG 2018), CHF (EF 25%) Diabetes, HLD, HTN, R Revision Total Knee Arthroplasty with Antibiotic Spacer on 7/22 by Dr. Castro for Prosthetic Joint Infection, and recent discharge for revision of knee and replacement of antibiotic spacer who presents for uncontrolled pain management, and poor wound care at Nursing home. Exam notable for L shin wound with clear discharge with dressing change. Spoke to Orthopedic resident who agreed to admission under Dr Garcia's [place] care. Will send labs, and manage pain with morphine.

## 2020-10-03 NOTE — DISCHARGE NOTE NURSING/CASE MANAGEMENT/SOCIAL WORK - PATIENT PORTAL LINK FT
You can access the FollowMyHealth Patient Portal offered by [facility] by registering at the following website: [email or web address]. By joining [facility]’s FollowMyHealth portal, you will also be able to view your health information using other applications (apps) compatible with our system.

## 2020-10-03 NOTE — ED PROVIDER NOTE - OBJECTIVE STATEMENT
This is a 65 year-old man with PMH of CAD (s/p CABG 2018), CHF (EF 25%) Diabetes, HLD, HTN, R Revision Total Knee Arthroplasty with Antibiotic Spacer on 7/22 by Dr. Castro for Prosthetic Joint Infection, who was recently discharged to a nursing home on the morning of 10/3/2020 for right knee revision and antibiotic spacer placement with gastroc flap, LLE STSG who presents today from nursing home complaining of worsening pain, and poor pain and wound care at the facility. Patient report that during his hospital course he was placed on a morphine regimen that was tolerable, but now the pain is uncontrolled. He rates his pain as 8/10 aching pain to left ankle to feet. Scant drainage noted on dressing, and clear drainage noted from dressing on left leg. Report itching to skin graft noted on left thigh. Denies SOB, chest pain, fever, chills, cough or change in mental status.

## 2020-10-03 NOTE — PROGRESS NOTE ADULT - SUBJECTIVE AND OBJECTIVE BOX
Subjective:   Patient seen and examined. Patient ready for DC this AM. States MIS accidentally pulled out when he was working with PT, plastics dc'ed drain this AM. Denies chest pain, shortness of breath, nausea/vomiting, numbness/tingling.    Objective:    Vital Signs Last 24 Hrs  T(C): 36.9 (03 Oct 2020 08:07), Max: 37.3 (02 Oct 2020 11:06)  T(F): 98.5 (03 Oct 2020 08:07), Max: 99.2 (02 Oct 2020 11:06)  HR: 81 (03 Oct 2020 08:07) (75 - 94)  BP: 101/55 (03 Oct 2020 08:07) (101/55 - 126/68)  BP(mean): --  RR: 16 (03 Oct 2020 08:07) (16 - 19)  SpO2: 94% (03 Oct 2020 08:07) (94% - 98%)      PE:  General: Patient alert and oriented, NAD  Dressing: RLE KI in place w/ dressing, ace wrap underneath. Clean/dry/intact BLE   Pulses: 2+ DP BLE   Sensation: SILT BLE   Motor: EHL/FHL/TA/GS 5/5 BLE                       Labs:                       8.3    13.70 )-----------( 533      ( 02 Oct 2020 06:50 )             28.8   01 Oct 2020 16:54    137    |  100    |  20     ----------------------------<  129    4.3     |  28     |  0.78       TPro  6.4    /  Alb  2.7    /  TBili  0.4    /  DBili  x      /  AST  16     /  ALT  16     /  AlkPhos  136    01 Oct 2020 16:54      A/P: 65yMale s/p right knee revision and antibiotic spacer placement with gastroc flap, LLE STSG now on IV abx, awaiting placement to rehab  1. Pain control as needed. Appreciate PM recs.  2. DVT prophylaxis: ASA, Effient  3. PT, weight-bearing status: TTWB RLE, WBAT LLE    4. Continue antibiotics. Appreciate ID recs.   5. Appreciate psych recs.   6. Dispo: [facility] today    Ortho Pager [phone]

## 2020-10-03 NOTE — ED ADULT NURSE NOTE - OBJECTIVE STATEMENT
Pt is a 64 y/o male A&Ox4 in NAD BIBA from NH c/o right knee pain. Pt was discharged to NH from [facility] today after having "extensive knee surgery." Pt states "I was there for fifteen minutes and they couldn't get anything straight, I need better care." Pt talking in clear, full sentences, respirations even and unlabored.

## 2020-10-03 NOTE — PROGRESS NOTE ADULT - PROVIDER SPECIALTY LIST ADULT
Cardiology
Electrophysiology
Infectious Disease
Internal Medicine
Orthopedics
Pain Medicine
Plastic Surgery
Podiatry
SICU
Surgery
Orthopedics
Infectious Disease
Internal Medicine
Orthopedics
Pain Medicine
Pain Medicine
Plastic Surgery
Infectious Disease
Plastic Surgery
Surgery
Infectious Disease
Internal Medicine

## 2020-10-03 NOTE — DISCHARGE NOTE NURSING/CASE MANAGEMENT/SOCIAL WORK - NSDCFUADDAPPT_GEN_ALL_CORE_FT
Follow-up with orthopedics (Dr. Jose Castro) within 1 week of discharge.    Follow-up with Dr. Zahra Casillas with infectious diseases after discharge. She will be following your blood work (CBC, CMP, ESR, CRP, Vanc trough) and helping to plan your antibiotic regimen.    Follow- up with Dr. Rajinder Mckeon (plastic surgery) within 1 week of discharge for wound management.     Follow-up with Dr. Davalos (podiatry) outpatient, or a podiatrist of your choice for continued diabetic foot ulcer management.    Follow-up with Dr. Latif (general surgery), or a general surgeon of your choice regarding your need for a cholecystectomy (gallbladder removal) in the near future. Report to ER for fever, abdominal pain, nausea/vomiting.    Please continue to follow-up with your primary care doctor, cardiologist Dr. Caldera, and vascular doctor Dr. Malloy for complete outpatient medical follow-up.

## 2020-10-03 NOTE — H&P ADULT - PROBLEM SELECTOR PLAN 1
Admit  Psych consult for evaluation  Pain control per pain management recs  Continue vanc and rifampin  DVT prophylaxis: ASA, Effient  PT, weight-bearing status: TTWB RLE, WBAT LLE     Resume home meds   Dispo planning

## 2020-10-03 NOTE — H&P ADULT - ASSESSMENT
65yMale s/p right knee revision and antibiotic spacer placement with gastroc flap, LLE STSG 9/18 now s/p readmission from rehab for pain control

## 2020-10-03 NOTE — H&P ADULT - NSHPPHYSICALEXAM_GEN_ALL_CORE
General: Patient alert and oriented, NAD  RLE seamus brace in place  Ace dressing intact, clean and dry    Sensation intact BL LE   Motor: EHL/FHL/TA/GS 5/5 BLE  2+ DP pulse

## 2020-10-03 NOTE — H&P ADULT - ATTENDING COMMENTS
Unfortunately he did not tolerate the SNF, citing poor nursing care, poor pain control, inadequate wound management, and the lack of a phone. He seemed emotionally distraught at not being able to get visits from his wife, though he does understand the reasoning behind their protocols given the pandemic. Clinically he is the same as at the time of his discharge. Will continue with all clinical treatments from prior admission while seeking more palatable options for his next disposition. He is continuing to request acute rehab at [facility].

## 2020-10-03 NOTE — PROGRESS NOTE ADULT - SUBJECTIVE AND OBJECTIVE BOX
Plastic Surgery Progress Note (pg [facility]: [id number], [facility]: [phone], [facility]: [phone])    SUBJECTIVE:  Doing well. No overnight events.     OBJECTIVE:     ** VITAL SIGNS / I&O's **    Vital Signs Last 24 Hrs  T(C): 36.9 (03 Oct 2020 08:07), Max: 37.3 (02 Oct 2020 11:06)  T(F): 98.5 (03 Oct 2020 08:07), Max: 99.2 (02 Oct 2020 11:06)  HR: 81 (03 Oct 2020 08:07) (75 - 94)  BP: 101/55 (03 Oct 2020 08:07) (101/55 - 126/68)  BP(mean): --  RR: 16 (03 Oct 2020 08:07) (16 - 19)  SpO2: 94% (03 Oct 2020 08:07) (94% - 98%)      02 Oct 2020 07:01  -  03 Oct 2020 07:00  --------------------------------------------------------  IN:  Total IN: 0 mL    OUT:    Bulb (mL): 40 mL    Voided (mL): 300 mL  Total OUT: 340 mL    Total NET: -340 mL      03 Oct 2020 07:01  -  03 Oct 2020 09:11  --------------------------------------------------------  IN:  Total IN: 0 mL    OUT:    Voided (mL): 200 mL  Total OUT: 200 mL    Total NET: -200 mL          ** PHYSICAL EXAM **    -- CONSTITUTIONAL: AOx3. NAD.   -- EXTREMITIES: L shin SG healing appropriately w/ some areas of epidermolysis, otherwise intact throughout; R knee SG healing appropriately w/ some small areas of epidermolysis, otherwise intact; gastroc donor incision c/d/i, MIS removed       **MEDS**  AQUAPHOR (petrolatum Ointment) 1 Application(s) Topical two times a day  aspirin enteric coated 81 milliGRAM(s) Oral two times a day  BACItracin   Ointment 1 Application(s) Topical daily  chlorhexidine 2% Cloths 1 Application(s) Topical <User Schedule>  cyclobenzaprine 10 milliGRAM(s) Oral three times a day PRN  dextrose 40% Gel 15 Gram(s) Oral once PRN  dextrose 40% Gel 15 Gram(s) Oral once PRN  dextrose 5%. 1000 milliLiter(s) IV Continuous <Continuous>  dextrose 5%. 1000 milliLiter(s) IV Continuous <Continuous>  dextrose 50% Injectable 12.5 Gram(s) IV Push once  dextrose 50% Injectable 25 Gram(s) IV Push once  dextrose 50% Injectable 25 Gram(s) IV Push once  digoxin     Tablet 0.125 milliGRAM(s) Oral daily  diphenhydrAMINE 50 milliGRAM(s) Oral every 4 hours PRN  gabapentin 300 milliGRAM(s) Oral three times a day  glucagon  Injectable 1 milliGRAM(s) IntraMuscular once PRN  glucagon  Injectable 1 milliGRAM(s) IntraMuscular once PRN  insulin glargine Injectable (LANTUS) 20 Unit(s) SubCutaneous at bedtime  insulin lispro (HumaLOG) corrective regimen sliding scale   SubCutaneous Before meals and at bedtime  insulin lispro Injectable (HumaLOG) 6 Unit(s) SubCutaneous three times a day before meals  metoclopramide Injectable 5 milliGRAM(s) IV Push every 8 hours PRN  metoprolol succinate ER 25 milliGRAM(s) Oral daily  morphine  - Injectable 4 milliGRAM(s) IV Push every 4 hours PRN  morphine  IR 30 milliGRAM(s) Oral every 4 hours PRN  morphine ER Tablet 15 milliGRAM(s) Oral daily  naloxone Injectable 0.1 milliGRAM(s) IV Push every 3 minutes PRN  nystatin Cream 1 Application(s) Topical two times a day  ondansetron Injectable 4 milliGRAM(s) IV Push every 6 hours PRN  polyethylene glycol 3350 17 Gram(s) Oral daily  prasugrel 10 milliGRAM(s) Oral daily  rifAMPin 300 milliGRAM(s) Oral every 12 hours  rosuvastatin 10 milliGRAM(s) Oral at bedtime  senna 2 Tablet(s) Oral at bedtime PRN  sertraline 25 milliGRAM(s) Oral daily  simethicone 80 milliGRAM(s) Chew three times a day PRN  sodium chloride 0.9% lock flush 10 milliLiter(s) IV Push every 1 hour PRN  spironolactone 25 milliGRAM(s) Oral daily  tamsulosin 0.4 milliGRAM(s) Oral at bedtime  vancomycin  IVPB 1250 milliGRAM(s) IV Intermittent every 24 hours      ** LABS **                          8.3    13.70 )-----------( 533      ( 02 Oct 2020 06:50 )             28.8     01 Oct 2020 16:54    137    |  100    |  20     ----------------------------<  129    4.3     |  28     |  0.78     Ca    8.0        01 Oct 2020 16:54    TPro  6.4    /  Alb  2.7    /  TBili  0.4    /  DBili  x      /  AST  16     /  ALT  16     /  AlkPhos  136    01 Oct 2020 16:54      CAPILLARY BLOOD GLUCOSE      POCT Blood Glucose.: 103 mg/dL (03 Oct 2020 07:51)  POCT Blood Glucose.: 107 mg/dL (03 Oct 2020 06:00)  POCT Blood Glucose.: 85 mg/dL (02 Oct 2020 22:20)  POCT Blood Glucose.: 97 mg/dL (02 Oct 2020 21:52)  POCT Blood Glucose.: 301 mg/dL (02 Oct 2020 17:54)  POCT Blood Glucose.: 224 mg/dL (02 Oct 2020 11:52)

## 2020-10-03 NOTE — PROGRESS NOTE ADULT - ASSESSMENT
66 y/o M POD # 13 s/p placement of R knee antibiotic spacer, gastroc flap, STSG, L shin vac. Clinically stable.     WOUND INSTRUCTIONS:  B/l Thigh SG donor sites: Skin graft donor site: leave open to air. Apply Aquaphor 4x/day.   Right knee and Left leg skin graft sites cover with Aquaphor and Adaptic, may place ABD pads over Adaptic and wrapped w/ an ACE bandage  Right calf below the knee should be gently wrapped w/ an ACE    - MIS was auto-d/c'd  - TTWB right side. Full WB left site.   - Ok to be discharged, pending insurance Auth     D/w Dr. Mike Mason  PGY-6  Plastic Surgery  [phone]

## 2020-10-03 NOTE — H&P ADULT - HISTORY OF PRESENT ILLNESS
65M significant medical comorbidities and extensive hospital stay s/p right knee revision and antibiotic spacer placement with gastroc flap, LLE STSG 9/18 discharged to rehab today 10/3 presenting to ED complaining of worsening pain. Reports poor pain control and wound care at rehab facility. Denies any new issues. Denies fevers, chills, nausea, vomiting, SOB, cough or change in mental status. Patient will be admitted for psych eval and dispo planning.

## 2020-10-03 NOTE — ED PROVIDER NOTE - ATTENDING CONTRIBUTION TO CARE
pt seen and examined with NP- 66 yo male with right TKR s/p prosthetic joint infection -spacer and antibiotics- dced home 3 hr ago to NH- now back with severe right knee pain no fevers or chills  pt also states that care at NH is not adequate for his needs - will need to transfer to [facility] facility- will readmit at this tome to control pain no signs to suggest sepsis  VSS afebrile

## 2020-10-04 DIAGNOSIS — M25.561 PAIN IN RIGHT KNEE: ICD-10-CM

## 2020-10-04 LAB
ANION GAP SERPL CALC-SCNC: 8 MMOL/L — SIGNIFICANT CHANGE UP (ref 5–17)
BUN SERPL-MCNC: 19 MG/DL — SIGNIFICANT CHANGE UP (ref 7–23)
CALCIUM SERPL-MCNC: 8.1 MG/DL — LOW (ref 8.4–10.5)
CHLORIDE SERPL-SCNC: 98 MMOL/L — SIGNIFICANT CHANGE UP (ref 96–108)
CO2 SERPL-SCNC: 28 MMOL/L — SIGNIFICANT CHANGE UP (ref 22–31)
CREAT SERPL-MCNC: 1.02 MG/DL — SIGNIFICANT CHANGE UP (ref 0.5–1.3)
GLUCOSE BLDC GLUCOMTR-MCNC: 225 MG/DL — HIGH (ref 70–99)
GLUCOSE BLDC GLUCOMTR-MCNC: 294 MG/DL — HIGH (ref 70–99)
GLUCOSE BLDC GLUCOMTR-MCNC: 304 MG/DL — HIGH (ref 70–99)
GLUCOSE SERPL-MCNC: 224 MG/DL — HIGH (ref 70–99)
HCT VFR BLD CALC: 28.6 % — LOW (ref 39–50)
HGB BLD-MCNC: 8 G/DL — LOW (ref 13–17)
MCHC RBC-ENTMCNC: 21.4 PG — LOW (ref 27–34)
MCHC RBC-ENTMCNC: 28 GM/DL — LOW (ref 32–36)
MCV RBC AUTO: 76.5 FL — LOW (ref 80–100)
NRBC # BLD: 0 /100 WBCS — SIGNIFICANT CHANGE UP (ref 0–0)
PLATELET # BLD AUTO: 530 K/UL — HIGH (ref 150–400)
POTASSIUM SERPL-MCNC: 4.8 MMOL/L — SIGNIFICANT CHANGE UP (ref 3.5–5.3)
POTASSIUM SERPL-SCNC: 4.8 MMOL/L — SIGNIFICANT CHANGE UP (ref 3.5–5.3)
RBC # BLD: 3.74 M/UL — LOW (ref 4.2–5.8)
RBC # FLD: 23.2 % — HIGH (ref 10.3–14.5)
SARS-COV-2 RNA SPEC QL NAA+PROBE: SIGNIFICANT CHANGE UP
SODIUM SERPL-SCNC: 134 MMOL/L — LOW (ref 135–145)
WBC # BLD: 11.89 K/UL — HIGH (ref 3.8–10.5)
WBC # FLD AUTO: 11.89 K/UL — HIGH (ref 3.8–10.5)

## 2020-10-04 RX ORDER — DEXTROSE 50 % IN WATER 50 %
25 SYRINGE (ML) INTRAVENOUS ONCE
Refills: 0 | Status: DISCONTINUED | OUTPATIENT
Start: 2020-10-04 | End: 2020-10-13

## 2020-10-04 RX ORDER — INSULIN LISPRO 100/ML
VIAL (ML) SUBCUTANEOUS
Refills: 0 | Status: DISCONTINUED | OUTPATIENT
Start: 2020-10-04 | End: 2020-10-13

## 2020-10-04 RX ORDER — FUROSEMIDE 40 MG
20 TABLET ORAL DAILY
Refills: 0 | Status: DISCONTINUED | OUTPATIENT
Start: 2020-10-04 | End: 2020-10-13

## 2020-10-04 RX ORDER — SERTRALINE 25 MG/1
25 TABLET, FILM COATED ORAL DAILY
Refills: 0 | Status: DISCONTINUED | OUTPATIENT
Start: 2020-10-04 | End: 2020-10-13

## 2020-10-04 RX ORDER — DIGOXIN 250 MCG
0.12 TABLET ORAL DAILY
Refills: 0 | Status: DISCONTINUED | OUTPATIENT
Start: 2020-10-04 | End: 2020-10-13

## 2020-10-04 RX ORDER — FUROSEMIDE 40 MG
20 TABLET ORAL ONCE
Refills: 0 | Status: COMPLETED | OUTPATIENT
Start: 2020-10-04 | End: 2020-10-04

## 2020-10-04 RX ORDER — ATORVASTATIN CALCIUM 80 MG/1
40 TABLET, FILM COATED ORAL AT BEDTIME
Refills: 0 | Status: DISCONTINUED | OUTPATIENT
Start: 2020-10-04 | End: 2020-10-13

## 2020-10-04 RX ORDER — SODIUM CHLORIDE 9 MG/ML
1000 INJECTION, SOLUTION INTRAVENOUS
Refills: 0 | Status: DISCONTINUED | OUTPATIENT
Start: 2020-10-04 | End: 2020-10-13

## 2020-10-04 RX ORDER — SPIRONOLACTONE 25 MG/1
25 TABLET, FILM COATED ORAL DAILY
Refills: 0 | Status: DISCONTINUED | OUTPATIENT
Start: 2020-10-04 | End: 2020-10-13

## 2020-10-04 RX ORDER — DEXTROSE 50 % IN WATER 50 %
15 SYRINGE (ML) INTRAVENOUS ONCE
Refills: 0 | Status: DISCONTINUED | OUTPATIENT
Start: 2020-10-04 | End: 2020-10-13

## 2020-10-04 RX ORDER — GLUCAGON INJECTION, SOLUTION 0.5 MG/.1ML
1 INJECTION, SOLUTION SUBCUTANEOUS ONCE
Refills: 0 | Status: DISCONTINUED | OUTPATIENT
Start: 2020-10-04 | End: 2020-10-13

## 2020-10-04 RX ORDER — TAMSULOSIN HYDROCHLORIDE 0.4 MG/1
0.4 CAPSULE ORAL AT BEDTIME
Refills: 0 | Status: DISCONTINUED | OUTPATIENT
Start: 2020-10-04 | End: 2020-10-13

## 2020-10-04 RX ORDER — DEXTROSE 50 % IN WATER 50 %
12.5 SYRINGE (ML) INTRAVENOUS ONCE
Refills: 0 | Status: DISCONTINUED | OUTPATIENT
Start: 2020-10-04 | End: 2020-10-13

## 2020-10-04 RX ORDER — DULOXETINE HYDROCHLORIDE 30 MG/1
30 CAPSULE, DELAYED RELEASE ORAL DAILY
Refills: 0 | Status: DISCONTINUED | OUTPATIENT
Start: 2020-10-04 | End: 2020-10-13

## 2020-10-04 RX ORDER — ASPIRIN/CALCIUM CARB/MAGNESIUM 324 MG
81 TABLET ORAL DAILY
Refills: 0 | Status: DISCONTINUED | OUTPATIENT
Start: 2020-10-04 | End: 2020-10-13

## 2020-10-04 RX ORDER — PRASUGREL 5 MG/1
10 TABLET, FILM COATED ORAL DAILY
Refills: 0 | Status: DISCONTINUED | OUTPATIENT
Start: 2020-10-04 | End: 2020-10-13

## 2020-10-04 RX ORDER — METOPROLOL TARTRATE 50 MG
25 TABLET ORAL DAILY
Refills: 0 | Status: DISCONTINUED | OUTPATIENT
Start: 2020-10-04 | End: 2020-10-13

## 2020-10-04 RX ADMIN — SPIRONOLACTONE 25 MILLIGRAM(S): 25 TABLET, FILM COATED ORAL at 07:42

## 2020-10-04 RX ADMIN — Medication 81 MILLIGRAM(S): at 13:59

## 2020-10-04 RX ADMIN — PRASUGREL 10 MILLIGRAM(S): 5 TABLET, FILM COATED ORAL at 14:02

## 2020-10-04 RX ADMIN — DULOXETINE HYDROCHLORIDE 30 MILLIGRAM(S): 30 CAPSULE, DELAYED RELEASE ORAL at 13:57

## 2020-10-04 RX ADMIN — GABAPENTIN 300 MILLIGRAM(S): 400 CAPSULE ORAL at 07:42

## 2020-10-04 RX ADMIN — ATORVASTATIN CALCIUM 40 MILLIGRAM(S): 80 TABLET, FILM COATED ORAL at 21:10

## 2020-10-04 RX ADMIN — Medication 3: at 22:23

## 2020-10-04 RX ADMIN — MORPHINE SULFATE 2 MILLIGRAM(S): 50 CAPSULE, EXTENDED RELEASE ORAL at 19:19

## 2020-10-04 RX ADMIN — GABAPENTIN 300 MILLIGRAM(S): 400 CAPSULE ORAL at 21:10

## 2020-10-04 RX ADMIN — Medication 2: at 18:08

## 2020-10-04 RX ADMIN — Medication 20 MILLIGRAM(S): at 19:19

## 2020-10-04 RX ADMIN — Medication 25 MILLIGRAM(S): at 07:42

## 2020-10-04 RX ADMIN — MORPHINE SULFATE 30 MILLIGRAM(S): 50 CAPSULE, EXTENDED RELEASE ORAL at 21:30

## 2020-10-04 RX ADMIN — MORPHINE SULFATE 30 MILLIGRAM(S): 50 CAPSULE, EXTENDED RELEASE ORAL at 05:10

## 2020-10-04 RX ADMIN — MORPHINE SULFATE 15 MILLIGRAM(S): 50 CAPSULE, EXTENDED RELEASE ORAL at 14:30

## 2020-10-04 RX ADMIN — POLYETHYLENE GLYCOL 3350 17 GRAM(S): 17 POWDER, FOR SOLUTION ORAL at 13:53

## 2020-10-04 RX ADMIN — Medication 166.67 MILLIGRAM(S): at 20:43

## 2020-10-04 RX ADMIN — SERTRALINE 25 MILLIGRAM(S): 25 TABLET, FILM COATED ORAL at 21:11

## 2020-10-04 RX ADMIN — MORPHINE SULFATE 30 MILLIGRAM(S): 50 CAPSULE, EXTENDED RELEASE ORAL at 20:44

## 2020-10-04 RX ADMIN — TAMSULOSIN HYDROCHLORIDE 0.4 MILLIGRAM(S): 0.4 CAPSULE ORAL at 21:10

## 2020-10-04 RX ADMIN — Medication 4: at 13:54

## 2020-10-04 RX ADMIN — MORPHINE SULFATE 15 MILLIGRAM(S): 50 CAPSULE, EXTENDED RELEASE ORAL at 13:53

## 2020-10-04 RX ADMIN — Medication 0.12 MILLIGRAM(S): at 07:42

## 2020-10-04 RX ADMIN — MORPHINE SULFATE 30 MILLIGRAM(S): 50 CAPSULE, EXTENDED RELEASE ORAL at 04:54

## 2020-10-04 RX ADMIN — GABAPENTIN 300 MILLIGRAM(S): 400 CAPSULE ORAL at 13:53

## 2020-10-04 RX ADMIN — MORPHINE SULFATE 2 MILLIGRAM(S): 50 CAPSULE, EXTENDED RELEASE ORAL at 19:35

## 2020-10-04 RX ADMIN — Medication 20 MILLIGRAM(S): at 07:42

## 2020-10-04 NOTE — PROGRESS NOTE ADULT - SUBJECTIVE AND OBJECTIVE BOX
Subjective:   Patient seen and examined, returned to ED last night for poor pain control and wound management at rehab. No issues overnight. Denies chest pain, shortness of breath, nausea/vomiting, numbness/tingling.      Vital Signs Last 24 Hrs  T(C): 37.1 (04 Oct 2020 07:33), Max: 37.1 (03 Oct 2020 18:10)  T(F): 98.7 (04 Oct 2020 07:33), Max: 98.7 (03 Oct 2020 18:10)  HR: 87 (04 Oct 2020 07:33) (87 - 95)  BP: 100/56 (04 Oct 2020 07:33) (100/56 - 116/71)  BP(mean): --  RR: 20 (04 Oct 2020 07:33) (18 - 21)  SpO2: 94% (04 Oct 2020 07:33) (92% - 96%)      PE:  General: Patient alert and oriented, NAD  Dressing: RLE KI in place w/ dressing, ace wrap underneath. Clean/dry/intact BLE   Pulses: 2+ DP BLE   Sensation: SILT BLE   Motor: EHL/FHL/TA/GS 5/5 BLE                       Labs:                                           8.0    11.89 )-----------( 530      ( 04 Oct 2020 06:55 )             28.6     10-04    134<L>  |  98  |  19  ----------------------------<  224<H>  4.8   |  28  |  1.02    Ca    8.1<L>      04 Oct 2020 06:55  Phos  3.5     10-03  Mg     2.0     10-03    TPro  7.3  /  Alb  2.8<L>  /  TBili  0.5  /  DBili  x   /  AST  See Note  /  ALT  See Note  /  AlkPhos  175<H>  10-03        A/P: 65yMale s/p right knee revision and antibiotic spacer placement with gastroc flap, LLE STSG now on IV abx, awaiting placement to rehab  1. Pain control as needed. Appreciate PM recs.  2. DVT prophylaxis: ASA, Effient  3. PT, weight-bearing status: TTWB RLE, WBAT LLE    4. Continue antibiotics. Appreciate ID recs.   5. Appreciate psych recs.   6. Dispo: pending    Ortho Pager [phone]

## 2020-10-04 NOTE — PATIENT PROFILE ADULT - STATED REASON FOR ADMISSION
was discharged from [facility] and px returned in less than 24 hrs for complaint of poor pain control and wound care at rehab facility

## 2020-10-05 LAB
ANION GAP SERPL CALC-SCNC: 9 MMOL/L — SIGNIFICANT CHANGE UP (ref 5–17)
BUN SERPL-MCNC: 18 MG/DL — SIGNIFICANT CHANGE UP (ref 7–23)
CALCIUM SERPL-MCNC: 7.8 MG/DL — LOW (ref 8.4–10.5)
CHLORIDE SERPL-SCNC: 98 MMOL/L — SIGNIFICANT CHANGE UP (ref 96–108)
CO2 SERPL-SCNC: 27 MMOL/L — SIGNIFICANT CHANGE UP (ref 22–31)
CREAT SERPL-MCNC: 0.83 MG/DL — SIGNIFICANT CHANGE UP (ref 0.5–1.3)
GLUCOSE BLDC GLUCOMTR-MCNC: 189 MG/DL — HIGH (ref 70–99)
GLUCOSE BLDC GLUCOMTR-MCNC: 209 MG/DL — HIGH (ref 70–99)
GLUCOSE BLDC GLUCOMTR-MCNC: 246 MG/DL — HIGH (ref 70–99)
GLUCOSE BLDC GLUCOMTR-MCNC: 278 MG/DL — HIGH (ref 70–99)
GLUCOSE SERPL-MCNC: 204 MG/DL — HIGH (ref 70–99)
HCT VFR BLD CALC: 29.6 % — LOW (ref 39–50)
HGB BLD-MCNC: 8.3 G/DL — LOW (ref 13–17)
MCHC RBC-ENTMCNC: 21.2 PG — LOW (ref 27–34)
MCHC RBC-ENTMCNC: 28 GM/DL — LOW (ref 32–36)
MCV RBC AUTO: 75.7 FL — LOW (ref 80–100)
NRBC # BLD: 0 /100 WBCS — SIGNIFICANT CHANGE UP (ref 0–0)
PLATELET # BLD AUTO: 487 K/UL — HIGH (ref 150–400)
POTASSIUM SERPL-MCNC: 4.5 MMOL/L — SIGNIFICANT CHANGE UP (ref 3.5–5.3)
POTASSIUM SERPL-SCNC: 4.5 MMOL/L — SIGNIFICANT CHANGE UP (ref 3.5–5.3)
RBC # BLD: 3.91 M/UL — LOW (ref 4.2–5.8)
RBC # FLD: 22.9 % — HIGH (ref 10.3–14.5)
SODIUM SERPL-SCNC: 134 MMOL/L — LOW (ref 135–145)
WBC # BLD: 9.33 K/UL — SIGNIFICANT CHANGE UP (ref 3.8–10.5)
WBC # FLD AUTO: 9.33 K/UL — SIGNIFICANT CHANGE UP (ref 3.8–10.5)

## 2020-10-05 PROCEDURE — 99232 SBSQ HOSP IP/OBS MODERATE 35: CPT | Mod: GC

## 2020-10-05 RX ORDER — INSULIN GLARGINE 100 [IU]/ML
10 INJECTION, SOLUTION SUBCUTANEOUS AT BEDTIME
Refills: 0 | Status: DISCONTINUED | OUTPATIENT
Start: 2020-10-05 | End: 2020-10-13

## 2020-10-05 RX ORDER — INSULIN LISPRO 100/ML
4 VIAL (ML) SUBCUTANEOUS
Refills: 0 | Status: DISCONTINUED | OUTPATIENT
Start: 2020-10-05 | End: 2020-10-13

## 2020-10-05 RX ADMIN — DULOXETINE HYDROCHLORIDE 30 MILLIGRAM(S): 30 CAPSULE, DELAYED RELEASE ORAL at 13:23

## 2020-10-05 RX ADMIN — Medication 20 MILLIGRAM(S): at 06:37

## 2020-10-05 RX ADMIN — MORPHINE SULFATE 30 MILLIGRAM(S): 50 CAPSULE, EXTENDED RELEASE ORAL at 02:23

## 2020-10-05 RX ADMIN — Medication 2: at 07:46

## 2020-10-05 RX ADMIN — PRASUGREL 10 MILLIGRAM(S): 5 TABLET, FILM COATED ORAL at 13:23

## 2020-10-05 RX ADMIN — MORPHINE SULFATE 30 MILLIGRAM(S): 50 CAPSULE, EXTENDED RELEASE ORAL at 20:10

## 2020-10-05 RX ADMIN — CYCLOBENZAPRINE HYDROCHLORIDE 5 MILLIGRAM(S): 10 TABLET, FILM COATED ORAL at 17:50

## 2020-10-05 RX ADMIN — Medication 1000 MILLIGRAM(S): at 18:30

## 2020-10-05 RX ADMIN — MORPHINE SULFATE 30 MILLIGRAM(S): 50 CAPSULE, EXTENDED RELEASE ORAL at 14:38

## 2020-10-05 RX ADMIN — Medication 3: at 12:55

## 2020-10-05 RX ADMIN — SERTRALINE 25 MILLIGRAM(S): 25 TABLET, FILM COATED ORAL at 13:23

## 2020-10-05 RX ADMIN — INSULIN GLARGINE 10 UNIT(S): 100 INJECTION, SOLUTION SUBCUTANEOUS at 22:29

## 2020-10-05 RX ADMIN — Medication 166.67 MILLIGRAM(S): at 21:42

## 2020-10-05 RX ADMIN — GABAPENTIN 300 MILLIGRAM(S): 400 CAPSULE ORAL at 21:42

## 2020-10-05 RX ADMIN — MORPHINE SULFATE 15 MILLIGRAM(S): 50 CAPSULE, EXTENDED RELEASE ORAL at 14:20

## 2020-10-05 RX ADMIN — MORPHINE SULFATE 30 MILLIGRAM(S): 50 CAPSULE, EXTENDED RELEASE ORAL at 16:10

## 2020-10-05 RX ADMIN — MORPHINE SULFATE 30 MILLIGRAM(S): 50 CAPSULE, EXTENDED RELEASE ORAL at 19:14

## 2020-10-05 RX ADMIN — Medication 1: at 22:30

## 2020-10-05 RX ADMIN — Medication 0.12 MILLIGRAM(S): at 06:38

## 2020-10-05 RX ADMIN — MORPHINE SULFATE 15 MILLIGRAM(S): 50 CAPSULE, EXTENDED RELEASE ORAL at 13:23

## 2020-10-05 RX ADMIN — MORPHINE SULFATE 2 MILLIGRAM(S): 50 CAPSULE, EXTENDED RELEASE ORAL at 07:00

## 2020-10-05 RX ADMIN — Medication 25 MILLIGRAM(S): at 06:38

## 2020-10-05 RX ADMIN — GABAPENTIN 300 MILLIGRAM(S): 400 CAPSULE ORAL at 06:37

## 2020-10-05 RX ADMIN — ATORVASTATIN CALCIUM 40 MILLIGRAM(S): 80 TABLET, FILM COATED ORAL at 21:42

## 2020-10-05 RX ADMIN — Medication 2: at 17:50

## 2020-10-05 RX ADMIN — Medication 1000 MILLIGRAM(S): at 17:50

## 2020-10-05 RX ADMIN — MORPHINE SULFATE 30 MILLIGRAM(S): 50 CAPSULE, EXTENDED RELEASE ORAL at 01:26

## 2020-10-05 RX ADMIN — GABAPENTIN 300 MILLIGRAM(S): 400 CAPSULE ORAL at 13:23

## 2020-10-05 RX ADMIN — SPIRONOLACTONE 25 MILLIGRAM(S): 25 TABLET, FILM COATED ORAL at 06:38

## 2020-10-05 RX ADMIN — MORPHINE SULFATE 2 MILLIGRAM(S): 50 CAPSULE, EXTENDED RELEASE ORAL at 06:37

## 2020-10-05 RX ADMIN — Medication 81 MILLIGRAM(S): at 13:23

## 2020-10-05 NOTE — PROGRESS NOTE ADULT - SUBJECTIVE AND OBJECTIVE BOX
Orthopaedic Surgery Progress Note    Patient seen and examined. ANAHI. Patient without complaints. Pain controlled. Denies CP, SOB, N/V, tactile fevers, calf pain.  Pt is s/p right knee antibiotic spacer on 7/122, s/p revision , Right gastroc flap, left STSG on 9/18. Discharged to [facility] on Saturday 10/3 and readmitted 8 hours later due to pain control issues. Pain management team aware.     Vital Signs Last 24 Hrs  T(C): 36.7 (05 Oct 2020 09:23), Max: 36.7 (05 Oct 2020 06:25)  T(F): 98 (05 Oct 2020 09:23), Max: 98 (05 Oct 2020 06:25)  HR: 76 (05 Oct 2020 09:23) (71 - 92)  BP: 94/50 (05 Oct 2020 09:23) (94/50 - 111/68)  BP(mean): --  RR: 19 (05 Oct 2020 09:23) (15 - 19)  SpO2: 97% (05 Oct 2020 09:23) (91% - 97%)         CAPILLARY BLOOD GLUCOSE      POCT Blood Glucose.: 209 mg/dL (05 Oct 2020 07:31)  POCT Blood Glucose.: 294 mg/dL (04 Oct 2020 22:10)  POCT Blood Glucose.: 225 mg/dL (04 Oct 2020 17:49)  POCT Blood Glucose.: 304 mg/dL (04 Oct 2020 13:40)                  Physical Exam:  Pt laying comfortably in bed, NAD.  Skin warm and well perfused, no visible erythema/ecchymoses.  Dressing C/D/I RLE, seamus in place - left lower extremity STSG dressing CDI    LABS                        8.3    9.33  )-----------( 487      ( 05 Oct 2020 06:22 )             29.6                                10-05    134<L>  |  98  |  18  ----------------------------<  204<H>  4.5   |  27  |  0.83    Ca    7.8<L>      05 Oct 2020 06:22  Phos  3.5     10-03  Mg     2.0     10-03    TPro  7.3  /  Alb  2.8<L>  /  TBili  0.5  /  DBili  x   /  AST  See Note  /  ALT  See Note  /  AlkPhos  175<H>  10-03      A/P: 65M s/p right knee antibiotic spacer on 7/122, s/p revision , Right gastroc flap, left STSG on 9/18.    CONTINUE:        1. PT: TTWB RLE   2. DVT prophylaxis: ASA 81 BID, Effient 10 QD  3. Pain Control as needed  - pain management recs to continue previous regimen, no IV - will see patient tomorrow   4. Dispo: Pending PT re-eval        Orthopaedic Surgery Progress Note    Patient seen and examined. ANAHI. Patient without complaints. Pain controlled. Denies CP, SOB, N/V, tactile fevers, calf pain.  Pt is s/p right knee antibiotic spacer on 7/122, s/p revision , Right gastroc flap, left STSG on 9/18. Discharged to [facility] on Saturday 10/3 and readmitted 8 hours later due to pain control issues. Pain management team aware.     Vital Signs Last 24 Hrs  T(C): 36.7 (05 Oct 2020 09:23), Max: 36.7 (05 Oct 2020 06:25)  T(F): 98 (05 Oct 2020 09:23), Max: 98 (05 Oct 2020 06:25)  HR: 76 (05 Oct 2020 09:23) (71 - 92)  BP: 94/50 (05 Oct 2020 09:23) (94/50 - 111/68)  BP(mean): --  RR: 19 (05 Oct 2020 09:23) (15 - 19)  SpO2: 97% (05 Oct 2020 09:23) (91% - 97%)         CAPILLARY BLOOD GLUCOSE      POCT Blood Glucose.: 209 mg/dL (05 Oct 2020 07:31)  POCT Blood Glucose.: 294 mg/dL (04 Oct 2020 22:10)  POCT Blood Glucose.: 225 mg/dL (04 Oct 2020 17:49)  POCT Blood Glucose.: 304 mg/dL (04 Oct 2020 13:40)                  Physical Exam:  Pt laying comfortably in bed, NAD.  Skin warm and well perfused, no visible erythema/ecchymoses.  Dressing C/D/I RLE, seamus in place - left lower extremity STSG dressing CDI  SLT in tact to distal bilateral lower extremities  EHL/FHL/TA/GS firing bilateral lower extremities  Calves soft and nontender to palpation  DP pulses palpable bilaterally     LABS                        8.3    9.33  )-----------( 487      ( 05 Oct 2020 06:22 )             29.6                                10-05    134<L>  |  98  |  18  ----------------------------<  204<H>  4.5   |  27  |  0.83    Ca    7.8<L>      05 Oct 2020 06:22  Phos  3.5     10-03  Mg     2.0     10-03    TPro  7.3  /  Alb  2.8<L>  /  TBili  0.5  /  DBili  x   /  AST  See Note  /  ALT  See Note  /  AlkPhos  175<H>  10-03      A/P: 65M s/p right knee antibiotic spacer on 7/122, s/p revision , Right gastroc flap, left STSG on 9/18.    CONTINUE:        1. PT: TTWB RLE   2. DVT prophylaxis: ASA 81 BID, Effient 10 QD  3. Pain Control as needed  - pain management recs to continue previous regimen, no IV - will see patient tomorrow   4. Dispo: Pending PT re-eval

## 2020-10-05 NOTE — PROGRESS NOTE ADULT - PROBLEM SELECTOR PLAN 10
Monitor hemoglobin. Hold transfusion unless hemoglobin is 7, 8 in patient with coronary artery disease, hemodynamic instability such as tachycardia/hypotension, or there is evidence of acute blood loss.  Anemia is due to postoperative blood loss.  Hb slowly increasing

## 2020-10-05 NOTE — PHYSICAL THERAPY INITIAL EVALUATION ADULT - PERTINENT HX OF CURRENT PROBLEM, REHAB EVAL
65M significant medical comorbidities and extensive hospital stay s/p right knee revision and antibiotic spacer placement with gastroc flap, LLE STSG 9/18 discharged to rehab 10/3 presenting to ED on same day complaining of worsening pain. Reports poor pain control and wound care at rehab facility.

## 2020-10-05 NOTE — PROGRESS NOTE ADULT - PROBLEM SELECTOR PLAN 3
Continue insulin sliding scale. Maintain her blood sugar in the range between 140- 180, and not below 70. Monitor for hypoglycemia. Monitor blood sugar with advancing diet. Hold oral hypoglycemic agents during hospitalization. Adjust insulin..  BS is uncontrolled. Started Lantus and Pre-meal insulin.

## 2020-10-05 NOTE — PROGRESS NOTE ADULT - SUBJECTIVE AND OBJECTIVE BOX
Ortho Note    Pt comfortable without complaints, pain controlled  Denies CP, SOB, N/V, numbness/tingling     Vital Signs Last 24 Hrs  T(C): 36.8 (10-05-20 @ 14:34), Max: 36.8 (10-05-20 @ 14:34)  T(F): 98.3 (10-05-20 @ 14:34), Max: 98.3 (10-05-20 @ 14:34)  HR: 86 (10-05-20 @ 14:34) (76 - 86)  BP: 100/53 (10-05-20 @ 14:34) (94/50 - 100/53)  BP(mean): --  RR: 18 (10-05-20 @ 14:34) (18 - 19)  SpO2: 94% (10-05-20 @ 14:34) (94% - 97%)  AVSS    General: Pt Alert and oriented, NAD  DSG C/D/I  Pulses:  Sensation:  Motor: EHL/FHL/TA/GS                          8.3    9.33  )-----------( 487      ( 05 Oct 2020 06:22 )             29.6   05 Oct 2020 06:22    134    |  98     |  18     ----------------------------<  204    4.5     |  27     |  0.83     Phos  3.5       03 Oct 2020 19:52  Mg     2.0       03 Oct 2020 19:52    TPro  7.3    /  Alb  2.8    /  TBili  0.5    /  DBili  x      /  AST  See Note  /  ALT  See Note  /  AlkPhos  175    03 Oct 2020 19:52      A/P: 65yMale POD# s/p   - Stable  - Pain Control  - DVT ppx:  - PT, WBS:     Ortho Pager [phone] Ortho Note    Pt comfortable without complaints, pain controlled  Denies CP, SOB, N/V, numbness/tingling     Vital Signs Last 24 Hrs  T(C): 36.8 (10-05-20 @ 14:34), Max: 36.8 (10-05-20 @ 14:34)  T(F): 98.3 (10-05-20 @ 14:34), Max: 98.3 (10-05-20 @ 14:34)  HR: 86 (10-05-20 @ 14:34) (76 - 86)  BP: 100/53 (10-05-20 @ 14:34) (94/50 - 100/53)  BP(mean): --  RR: 18 (10-05-20 @ 14:34) (18 - 19)  SpO2: 94% (10-05-20 @ 14:34) (94% - 97%)  AVSS    General: Patient alert and oriented, NAD  RLE seamus brace in place  Ace dressing intact, clean and dry    Sensation intact BL LE   Motor: EHL/FHL/TA/GS 5/5 BLE  2+ DP pulse                          8.3    9.33  )-----------( 487      ( 05 Oct 2020 06:22 )             29.6   05 Oct 2020 06:22    134    |  98     |  18     ----------------------------<  204    4.5     |  27     |  0.83     Phos  3.5       03 Oct 2020 19:52  Mg     2.0       03 Oct 2020 19:52    TPro  7.3    /  Alb  2.8    /  TBili  0.5    /  DBili  x      /  AST  See Note  /  ALT  See Note  /  AlkPhos  175    03 Oct 2020 19:52      A/P: 65yMale s/p right knee revision and antibiotic spacer placement with gastroc flap, LLE STSG 9/18 now s/p readmission from rehab for disposition   - Stable  - Pain Control - restarted all pain meds  - DVT ppx: ASA, effient  - PT, WBS: TTWB RLE, WBAT LLE  - continue abx  - f/u psych input   - dispo pending     Ortho Pager [phone]

## 2020-10-05 NOTE — PHYSICAL THERAPY INITIAL EVALUATION ADULT - IMPAIRMENTS CONTRIBUTING TO GAIT DEVIATIONS, PT EVAL
decreased strength/pain/impaired postural control/decreased aerobic capacity/endurance/impaired balance

## 2020-10-05 NOTE — PHYSICAL THERAPY INITIAL EVALUATION ADULT - GAIT DEVIATIONS NOTED, PT EVAL
decreased louann/forward flexed posture/decreased step length/decreased stride length/decreased weight-shifting ability

## 2020-10-05 NOTE — PHYSICAL THERAPY INITIAL EVALUATION ADULT - IMPAIRED TRANSFERS: SIT/STAND, REHAB EVAL
pain/impaired postural control/impaired balance/decreased aerobic capacity/endurance/decreased strength

## 2020-10-05 NOTE — PROGRESS NOTE ADULT - SUBJECTIVE AND OBJECTIVE BOX
Interval Events: Reviewed  Patient seen and examined at bedside.    Patient is a 65y old  Male who presents with a chief complaint of R Knee Swelling (04 Oct 2020 08:56)    he had pain and the nursing home did nto control his pain  PAST MEDICAL & SURGICAL HISTORY:  Diabetic neuropathy    STEMI (ST elevation myocardial infarction)    Diverticulitis    MRSA bacteremia    History of celiac disease    CHF (congestive heart failure)  EF ~ 25%    HTN (hypertension)    Diabetes  on insulin pump    Blood clot due to device, implant, or graft  was on blood thinners    HLD (hyperlipidemia)    Osteoarthritis    Atherosclerosis of coronary artery  CAD (coronary artery disease)    Status post percutaneous transluminal coronary angioplasty  in 2012    History of open reduction and internal fixation (ORIF) procedure    Surgery, elective  Right shoulder    Surgery, elective  right knee wound debridement    S/P TKR (total knee replacement), right  with infection Mrsa   per pt he was cleared from MRSA infection    S/P CABG x 1  2018    Stented coronary artery  10/18 heart attack  INFERIOR WALL MI    Other postprocedural status  Fixation hardware in foot LEFT        MEDICATIONS:  Pulmonary:    Antimicrobials:  rifAMPin 300 milliGRAM(s) Oral every 12 hours  vancomycin  IVPB 1250 milliGRAM(s) IV Intermittent every 24 hours    Anticoagulants:  prasugrel 10 milliGRAM(s) Oral daily    Cardiac:  digoxin     Tablet 0.125 milliGRAM(s) Oral daily  furosemide    Tablet 20 milliGRAM(s) Oral daily  metoprolol succinate ER 25 milliGRAM(s) Oral daily  spironolactone 25 milliGRAM(s) Oral daily  tamsulosin 0.4 milliGRAM(s) Oral at bedtime      Allergies    ACE inhibitors (Hives)  carvedilol (Other)  enalapril (Hives)  Entresto (Other)    Intolerances        Vital Signs Last 24 Hrs  T(C): 36.6 (05 Oct 2020 16:43), Max: 36.8 (05 Oct 2020 14:34)  T(F): 97.9 (05 Oct 2020 16:43), Max: 98.3 (05 Oct 2020 14:34)  HR: 80 (05 Oct 2020 16:43) (71 - 91)  BP: 105/56 (05 Oct 2020 16:43) (94/50 - 111/68)  BP(mean): --  RR: 18 (05 Oct 2020 16:43) (18 - 19)  SpO2: 95% (05 Oct 2020 16:43) (94% - 97%)    10-04 @ 07:01  -  10-05 @ 07:00  --------------------------------------------------------  IN: 250 mL / OUT: 2620 mL / NET: -2370 mL    10-05 @ 07:01  -  10-05 @ 18:59  --------------------------------------------------------  IN: 720 mL / OUT: 351 mL / NET: 369 mL          Review of Systems:   •	General: negative  •	Skin/Breast: negative  •	Ophthalmologic: negative  •	ENMT: negative  •	Respiratory and Thorax: negative  •	Cardiovascular: negative  •	Gastrointestinal: negative  •	Genitourinary: negative  •	Musculoskeletal: negative  •	Neurological: negative  •	Psychiatric: negative  •	Hematology/Lymphatics: negative  •	Endocrine: negative  •	Allergic/Immunologic: negative    Physical Exam:   • Constitutional:	Well-developed, well nourished  • Eyes:	EOMI; PERRL; no drainage or redness  • ENMT:	No oral lesions; no gross abnormalities  • Neck	No bruits; no thyromegaly or nodules  • Breasts:	not examined  • Back:	No deformity or limitation of movement  • Respiratory:	Breath Sounds equal & clear to percussion & auscultation, no accessory muscle use  • Cardiovascular:	Regular rate & rhythm, normal S1, S2; no murmurs, gallops or rubs; no S3, S4  • Gastrointestinal:	Soft, non-tender, no hepatosplenomegaly, normal bowel sounds  • Genitourinary:	not examined  • Rectal: not examined  • Extremities:	No cyanosis, clubbing or edema  • Vascular:	Equal and normal pulses (carotid, femoral, dorsalis pedis)  • Neurologica:l	not examined  • Skin:	No lesions; no rash  • Lymph Nodes:	No lymphadedenopathy  • Musculoskeletal:	No joint pain, swelling or deformity; no limitation of movement        LABS:      CBC Full  -  ( 05 Oct 2020 06:22 )  WBC Count : 9.33 K/uL  RBC Count : 3.91 M/uL  Hemoglobin : 8.3 g/dL  Hematocrit : 29.6 %  Platelet Count - Automated : 487 K/uL  Mean Cell Volume : 75.7 fl  Mean Cell Hemoglobin : 21.2 pg  Mean Cell Hemoglobin Concentration : 28.0 gm/dL  Auto Neutrophil # : x  Auto Lymphocyte # : x  Auto Monocyte # : x  Auto Eosinophil # : x  Auto Basophil # : x  Auto Neutrophil % : x  Auto Lymphocyte % : x  Auto Monocyte % : x  Auto Eosinophil % : x  Auto Basophil % : x    10-05    134<L>  |  98  |  18  ----------------------------<  204<H>  4.5   |  27  |  0.83    Ca    7.8<L>      05 Oct 2020 06:22  Phos  3.5     10-03  Mg     2.0     10-03    TPro  7.3  /  Alb  2.8<L>  /  TBili  0.5  /  DBili  x   /  AST  See Note  /  ALT  See Note  /  AlkPhos  175<H>  10-03                        RADIOLOGY & ADDITIONAL STUDIES (The following images were personally reviewed):  Loja:                                     No  Urine output:                       adequate  DVT prophylaxis:                 Yes  Flattus:                                  Yes  Bowel movement:              No

## 2020-10-05 NOTE — PHYSICAL THERAPY INITIAL EVALUATION ADULT - MANUAL MUSCLE TESTING RESULTS, REHAB EVAL
grossly assessed due to/functional mobility testing; >/=3+/5 bilateral UE and LLE; RLE NT due to seamus brace, WB restrictions and s/p surgery

## 2020-10-05 NOTE — PROGRESS NOTE ADULT - PROBLEM SELECTOR PLAN 1
on broad spectrum antibiotics,   f/u intraop c/s  f/u vanco level and vanco was adjusted  I will discuss with ortho regarding the recent cultures from the right hip and is negative but the abdominal wound cultures are positive fro VREF and Pseudomonas.  He is on vanco and Rifampin  culture positive for MRSA with negative blood culture repeat but 4 bottles were positive on admission  JOHN PAUL unlikely endocarditis but might need to be treated as such.  The AICD bed is stable with no signs of infection  On Vanco and Rifampin, PICC line Monday.   hardware removed and follow on cultures which is negative to date  he is stable and he has area of necrosis over the wound and is followed by plastics  sepsis resolved and he is on antibiotic  I

## 2020-10-05 NOTE — PHYSICAL THERAPY INITIAL EVALUATION ADULT - GENERAL OBSERVATIONS, REHAB EVAL
Pt received semi supine, +RLE ace bandage and seamus brace, +L shin bandage C/D/I, +bilateral thigh skin graft sites C/D/I, +telemetry, +pulse ox, +heplock, NAD, agreeable to PT.

## 2020-10-05 NOTE — PHYSICAL THERAPY INITIAL EVALUATION ADULT - ACTIVE RANGE OF MOTION EXAMINATION, REHAB EVAL
bilateral upper extremity Active ROM was WFL (within functional limits)/Left LE Active ROM was WFL (within functional limits)/RLE NT due to seamus brace and s/p surgery

## 2020-10-05 NOTE — PHYSICAL THERAPY INITIAL EVALUATION ADULT - ADDITIONAL COMMENTS
Pt lives with his wife in a house with [place]. Reports to [facility] from rehab where he only stayed a few hours due to poor pain control. Pt reports that prior to last hospitalization, he was at home and "doing good," ambulating independently both with and without the RW.

## 2020-10-06 ENCOUNTER — TRANSCRIPTION ENCOUNTER (OUTPATIENT)
Age: 65
End: 2020-10-06

## 2020-10-06 LAB
GLUCOSE BLDC GLUCOMTR-MCNC: 189 MG/DL — HIGH (ref 70–99)
GLUCOSE BLDC GLUCOMTR-MCNC: 194 MG/DL — HIGH (ref 70–99)
GLUCOSE BLDC GLUCOMTR-MCNC: 212 MG/DL — HIGH (ref 70–99)
GLUCOSE BLDC GLUCOMTR-MCNC: 222 MG/DL — HIGH (ref 70–99)
VANCOMYCIN TROUGH SERPL-MCNC: 17.6 UG/ML — SIGNIFICANT CHANGE UP (ref 10–20)

## 2020-10-06 PROCEDURE — 73552 X-RAY EXAM OF FEMUR 2/>: CPT | Mod: 26,RT

## 2020-10-06 PROCEDURE — 73590 X-RAY EXAM OF LOWER LEG: CPT | Mod: 26,RT

## 2020-10-06 PROCEDURE — 93306 TTE W/DOPPLER COMPLETE: CPT | Mod: 26

## 2020-10-06 PROCEDURE — 99232 SBSQ HOSP IP/OBS MODERATE 35: CPT | Mod: GC

## 2020-10-06 PROCEDURE — 99223 1ST HOSP IP/OBS HIGH 75: CPT

## 2020-10-06 RX ORDER — MORPHINE SULFATE 50 MG/1
15 CAPSULE, EXTENDED RELEASE ORAL
Refills: 0 | Status: DISCONTINUED | OUTPATIENT
Start: 2020-10-06 | End: 2020-10-07

## 2020-10-06 RX ORDER — MORPHINE SULFATE 50 MG/1
2 CAPSULE, EXTENDED RELEASE ORAL ONCE
Refills: 0 | Status: DISCONTINUED | OUTPATIENT
Start: 2020-10-06 | End: 2020-10-06

## 2020-10-06 RX ORDER — BACITRACIN ZINC 500 UNIT/G
1 OINTMENT IN PACKET (EA) TOPICAL DAILY
Refills: 0 | Status: DISCONTINUED | OUTPATIENT
Start: 2020-10-06 | End: 2020-10-13

## 2020-10-06 RX ORDER — DIPHENHYDRAMINE HCL 50 MG
25 CAPSULE ORAL EVERY 4 HOURS
Refills: 0 | Status: COMPLETED | OUTPATIENT
Start: 2020-10-06 | End: 2020-10-06

## 2020-10-06 RX ORDER — GABAPENTIN 400 MG/1
400 CAPSULE ORAL THREE TIMES A DAY
Refills: 0 | Status: DISCONTINUED | OUTPATIENT
Start: 2020-10-06 | End: 2020-10-13

## 2020-10-06 RX ADMIN — MORPHINE SULFATE 30 MILLIGRAM(S): 50 CAPSULE, EXTENDED RELEASE ORAL at 22:46

## 2020-10-06 RX ADMIN — Medication 166.67 MILLIGRAM(S): at 21:50

## 2020-10-06 RX ADMIN — MORPHINE SULFATE 30 MILLIGRAM(S): 50 CAPSULE, EXTENDED RELEASE ORAL at 06:40

## 2020-10-06 RX ADMIN — GABAPENTIN 400 MILLIGRAM(S): 400 CAPSULE ORAL at 21:50

## 2020-10-06 RX ADMIN — GABAPENTIN 300 MILLIGRAM(S): 400 CAPSULE ORAL at 14:16

## 2020-10-06 RX ADMIN — INSULIN GLARGINE 10 UNIT(S): 100 INJECTION, SOLUTION SUBCUTANEOUS at 21:50

## 2020-10-06 RX ADMIN — Medication 2: at 08:27

## 2020-10-06 RX ADMIN — Medication 4 UNIT(S): at 09:50

## 2020-10-06 RX ADMIN — Medication 2: at 21:50

## 2020-10-06 RX ADMIN — SPIRONOLACTONE 25 MILLIGRAM(S): 25 TABLET, FILM COATED ORAL at 06:34

## 2020-10-06 RX ADMIN — Medication 25 MILLIGRAM(S): at 09:50

## 2020-10-06 RX ADMIN — Medication 1: at 18:01

## 2020-10-06 RX ADMIN — MORPHINE SULFATE 2 MILLIGRAM(S): 50 CAPSULE, EXTENDED RELEASE ORAL at 16:20

## 2020-10-06 RX ADMIN — Medication 0.12 MILLIGRAM(S): at 06:34

## 2020-10-06 RX ADMIN — Medication 4 UNIT(S): at 18:02

## 2020-10-06 RX ADMIN — MORPHINE SULFATE 30 MILLIGRAM(S): 50 CAPSULE, EXTENDED RELEASE ORAL at 07:26

## 2020-10-06 RX ADMIN — Medication 81 MILLIGRAM(S): at 14:15

## 2020-10-06 RX ADMIN — MORPHINE SULFATE 15 MILLIGRAM(S): 50 CAPSULE, EXTENDED RELEASE ORAL at 15:10

## 2020-10-06 RX ADMIN — Medication 20 MILLIGRAM(S): at 06:34

## 2020-10-06 RX ADMIN — MORPHINE SULFATE 30 MILLIGRAM(S): 50 CAPSULE, EXTENDED RELEASE ORAL at 21:51

## 2020-10-06 RX ADMIN — ATORVASTATIN CALCIUM 40 MILLIGRAM(S): 80 TABLET, FILM COATED ORAL at 21:50

## 2020-10-06 RX ADMIN — GABAPENTIN 300 MILLIGRAM(S): 400 CAPSULE ORAL at 06:34

## 2020-10-06 RX ADMIN — DULOXETINE HYDROCHLORIDE 30 MILLIGRAM(S): 30 CAPSULE, DELAYED RELEASE ORAL at 14:14

## 2020-10-06 RX ADMIN — MORPHINE SULFATE 15 MILLIGRAM(S): 50 CAPSULE, EXTENDED RELEASE ORAL at 14:14

## 2020-10-06 RX ADMIN — MORPHINE SULFATE 30 MILLIGRAM(S): 50 CAPSULE, EXTENDED RELEASE ORAL at 14:16

## 2020-10-06 RX ADMIN — MORPHINE SULFATE 30 MILLIGRAM(S): 50 CAPSULE, EXTENDED RELEASE ORAL at 15:00

## 2020-10-06 RX ADMIN — PRASUGREL 10 MILLIGRAM(S): 5 TABLET, FILM COATED ORAL at 14:15

## 2020-10-06 RX ADMIN — SERTRALINE 25 MILLIGRAM(S): 25 TABLET, FILM COATED ORAL at 14:15

## 2020-10-06 RX ADMIN — MORPHINE SULFATE 2 MILLIGRAM(S): 50 CAPSULE, EXTENDED RELEASE ORAL at 16:06

## 2020-10-06 RX ADMIN — Medication 25 MILLIGRAM(S): at 10:40

## 2020-10-06 NOTE — DISCHARGE NOTE PROVIDER - NSDCCPCAREPLAN_GEN_ALL_CORE_FT
PRINCIPAL DISCHARGE DIAGNOSIS  Diagnosis: Knee pain, right  Assessment and Plan of Treatment:

## 2020-10-06 NOTE — DISCHARGE NOTE PROVIDER - CARE PROVIDER_API CALL
Jose Castro)  Orthopedics  [address] [place] [address]  Phone: [phone]  Fax: [phone]  Follow Up Time: 1 week    Zahra Casillas)  Infectious Disease; Internal Medicine  [address] [place] [address]  Phone: [phone]  Fax: [phone]  Follow Up Time: 2 weeks    Rajinder Mckeon  PLASTIC SURGERY  [address] [place] [address]  Phone: [phone]  Fax: [phone]  Follow Up Time: 1 week    Jacquelyn Davalos  FOOT AND ANKLE SURGERY  [address] [place] [address]  Phone: [phone]  Fax: [phone]  Follow Up Time: Routine    Parveen Latif  SURGERY  [address] [place] [address]  Phone: [phone]  Fax: [phone]  Follow Up Time: 1 month    Lilia Caldera  CARDIOVASCULAR DISEASE  [address] [place] [address]  Phone: [phone]  Fax: [phone]  Follow Up Time: Routine

## 2020-10-06 NOTE — PROGRESS NOTE ADULT - SUBJECTIVE AND OBJECTIVE BOX
Pt comfortable.   No acute events.    Exam:  AVSS. NAD.   Right knee skin graft and muscle flap healthy. Donor site clean.   Left leg skin graft with majority take.

## 2020-10-06 NOTE — PROGRESS NOTE ADULT - SUBJECTIVE AND OBJECTIVE BOX
Ortho Note    Pt seen and examined. Pain controlled when not moving, but reports has been somewhat uncontrolled with  a lot of movement. Appears comfortable in bed.   Denies CP, SOB, N/V, numbness/tingling.   Reports returning from [facility] because no one was attending to his wounds or pain.    Vital Signs Last 24 Hrs  T(C): 37.1 (10-06-20 @ 14:10), Max: 37.1 (10-06-20 @ 14:10)  T(F): 98.7 (10-06-20 @ 14:10), Max: 98.7 (10-06-20 @ 14:10)  HR: 80 (10-06-20 @ 14:10) (80 - 80)  BP: 100/58 (10-06-20 @ 14:10) (100/58 - 100/58)  BP(mean): --  RR: 17 (10-06-20 @ 14:10) (17 - 17)  SpO2: 98% (10-06-20 @ 14:10) (98% - 98%)  AVSS    RLE:  DSG: R knee ace wrap changed, CDI; flap healing well; no drainage  Pulses: +DP;  feet cool to touch; at baseline; no edema  Sensation: Sensation intact to baseline (diminished sensation- h/o severe neuropathy; follows with vascular Dr. Malloy outpatient)  Motor: 5/5 EHL/FHL    LLE:  DSG: abd pad/ kerlix CDI to vascular wound  Pulses: feet cool to touch; at baseline; no edema  Sensation: SILT to baseline (diminished sensation- h/o severe neuropathy; follows with vascular Dr. Malloy outpatient)  Motor: 5/5 EHL/FHL/TA/GS                          8.3    9.33  )-----------( 487      ( 05 Oct 2020 06:22 )             29.6   05 Oct 2020 06:22    134    |  98     |  18     ----------------------------<  204    4.5     |  27     |  0.83           A/P: 65yMale admitted for R knee pain, drainage s/p right knee arthroscopic I+D 7/17, right knee explant and abx spacer 7/22 with Dr. Castro;  and debridement of LLE wound with wound vac placement on 7/30 by Dr. Bowen. Was original d/c'd home on 8/10/20 with PICC and vancomycin IV q12h + rifampin.  Readmitted and s/p R hip exchange of hardware on 09/04/2020; I+D of RUQ on 09/04/2020; R Knee Revision Antibiotic Spacer by Dr. Castro, R Knee medial gastroc flap and LLE STSG by Dr. Mckeon on 9/18. Discharged to [facility] on 10/3/20 and returned to [facility] the same day with reports of uncontrolled pain and lack of medical treatment at [facility]  - Continue to f/u CBC with diff, CMP,  inflammatory markers and vanc trophs- ordered for tomorrow; plan to f/u with ID Dr. Casillas outpatient; will consult with any change in patient status or lab results  - medical and cardiology co-management; no current need for telemetry monitoring as per recs  - Pain Control- appreciate pain management recs; 1x dose morphine 2mg IV to be given prior to PT today  - DVT ppx: ASA + effient (home regimen)  - PT, WBS: TTWB RLE in seamus brace; WBAT LLE  - dressing changes as per plastics recs (Dr. Calvillo) on 10/2 prior to previous d/c- bacitracin, adaptiq, abd pads to LLE and RLE skin graft sites; RLE to be wrapped loosely in ace wrap  - podiatry debridements from prior admission well-appearing- will reconsult as needed; follow-up outpatient  - RUQ drain site not actively draining at this time- plan to follow-up with Dr. Latif regarding elective cholecystectomy in the future  - OOB for meals as tolerated, aggressive I/S  - bowel regimen  - f/u repeat R tib/fib and R femur x-rays today  - dispo: [facility] placement      Ortho Pager [phone]

## 2020-10-06 NOTE — DISCHARGE NOTE PROVIDER - NSDCMRMEDTOKEN_GEN_ALL_CORE_FT
aspirin 81 mg oral delayed release tablet: 1 tab(s) orally 2 times a day  atorvastatin 40 mg oral tablet: 1 tab(s) orally once a day (at bedtime)  SHIVANI BRACE: SHIVANI BRACE LOCKED AT 15 DEG  cyclobenzaprine 10 mg oral tablet: 1 tab(s) orally 3 times a day, As needed, Muscle Spasm  digoxin 125 mcg (0.125 mg) oral tablet: 1 tab(s) orally once a day  DULoxetine 30 mg oral delayed release capsule: 3 cap(s) orally once a day  furosemide 20 mg oral tablet: 1 tab(s) orally once a day as needed for orthopnea  gabapentin 300 mg oral capsule: 1 cap(s) orally 3 times a day  insulin glargine: 20 unit(s) subcutaneous once a day (at bedtime)  insulin lispro (concentrated) 200 units/mL subcutaneous solution:  Insulin lispro sliding scale with every meal and at bedtime:   2 Unit(s) if Glucose 151 - 200  4 Unit(s) if Glucose 201 - 250  6 Unit(s) if Glucose 251 - 300  8 Unit(s) if Glucose 301 - 350  10 Unit(s) if Glucose 351 - 400  12 Unit(s) if Glucose Greater Than 400    metoprolol succinate 25 mg oral tablet, extended release: 1 tab(s) orally once a day  morphine 15 mg oral tablet: 1 tab(s) orally every 4 hours, As needed, Moderate Pain (4 - 6)  morphine 15 mg/8 to 12 hr oral tablet, extended release: 1 tab(s) orally once a day  morphine 30 mg oral tablet: 1 tab(s) orally every 4 hours, As needed, Severe Pain (7 - 10)  polyethylene glycol 3350 oral powder for reconstitution: 17 gram(s) orally once a day as needed for constipation  prasugrel 10 mg oral tablet: 1 tab(s) orally once a day  rifAMPin 300 mg oral capsule: 1 cap(s) orally every 12 hours - tentative end date of 10/29/2020  senna oral tablet: 2 tab(s) orally once a day (at bedtime), As needed, Constipation  sertraline 25 mg oral tablet: 1 tab(s) orally once a day  spironolactone 25 mg oral tablet: 1 tab(s) orally once a day  tamsulosin 0.4 mg oral capsule: 1 cap(s) orally once a day (at bedtime)  vancomycin 1.25 g intravenous injection: 1.25 gram(s) intravenous every 24 hours - tentative end date of 10/29/2020   acetaminophen 500 mg oral tablet: 2 tab(s) orally every 8 hours, As needed, Temp greater or equal to 38C (100.4F), Mild Pain (1 - 3)  aspirin 81 mg oral delayed release tablet: 1 tab(s) orally 2 times a day  atorvastatin 40 mg oral tablet: 1 tab(s) orally once a day (at bedtime)  bacitracin 500 units/g topical ointment: 1 application topically once a day to graft sites left shin and right leg below knee  SHIVANI BRACE: SHIVANI BRACE LOCKED AT 15 DEG  cyclobenzaprine 10 mg oral tablet: 1 tab(s) orally 3 times a day, As needed, Muscle Spasm  digoxin 125 mcg (0.125 mg) oral tablet: 1 tab(s) orally once a day  DULoxetine 30 mg oral delayed release capsule: 3 cap(s) orally once a day  furosemide 20 mg oral tablet: 1 tab(s) orally once a day as needed for orthopnea  gabapentin 300 mg oral capsule: 1 cap(s) orally 3 times a day  heparin flush 100 units/mL intravenous solution: 3 milliliter(s) flush through PICC once a day after antibiotics  insulin glargine: 20 unit(s) subcutaneous once a day (at bedtime)  insulin lispro (concentrated) 200 units/mL subcutaneous solution:  Insulin lispro sliding scale with every meal and at bedtime:   2 Unit(s) if Glucose 151 - 200  4 Unit(s) if Glucose 201 - 250  6 Unit(s) if Glucose 251 - 300  8 Unit(s) if Glucose 301 - 350  10 Unit(s) if Glucose 351 - 400  12 Unit(s) if Glucose Greater Than 400    insulin lispro 100 units/mL injectable solution: 4 unit(s) injectable 3 times a day (before meals)  metoprolol succinate 25 mg oral tablet, extended release: 1 tab(s) orally once a day  morphine 15 mg oral tablet: 1 tab(s) orally every 4 hours, As needed, Moderate Pain (4 - 6)  morphine 15 mg/8 to 12 hr oral tablet, extended release: 1 tab(s) orally once a day  morphine 30 mg oral tablet: 1 tab(s) orally every 4 hours, As needed, Severe Pain (7 - 10)  polyethylene glycol 3350 oral powder for reconstitution: 17 gram(s) orally once a day as needed for constipation  prasugrel 10 mg oral tablet: 1 tab(s) orally once a day  rifAMPin 300 mg oral capsule: 1 cap(s) orally every 12 hours - tentative end date of 10/29/2020  senna oral tablet: 2 tab(s) orally once a day (at bedtime), As needed, Constipation  sertraline 25 mg oral tablet: 1 tab(s) orally once a day  spironolactone 25 mg oral tablet: 1 tab(s) orally once a day  tamsulosin 0.4 mg oral capsule: 1 cap(s) orally once a day (at bedtime)  vancomycin 1.25 g intravenous injection: 1.25 gram(s) intravenous every 24 hours - tentative end date of 10/29/2020   acetaminophen 500 mg oral tablet: 2 tab(s) orally every 8 hours, As needed, Temp greater or equal to 38C (100.4F), Mild Pain (1 - 3)  aspirin 81 mg oral delayed release tablet: 1 tab(s) orally 2 times a day  atorvastatin 40 mg oral tablet: 1 tab(s) orally once a day (at bedtime)  bacitracin 500 units/g topical ointment: 1 application topically once a day to graft sites left shin and right leg below knee  SHIVANI BRACE: SHIVANI BRACE LOCKED AT 15 DEG  cyclobenzaprine 10 mg oral tablet: 1 tab(s) orally 3 times a day, As needed, Muscle Spasm  digoxin 125 mcg (0.125 mg) oral tablet: 1 tab(s) orally once a day  DULoxetine 30 mg oral delayed release capsule: 3 cap(s) orally once a day  furosemide 40 mg oral tablet: 1 tab(s) orally once a day  gabapentin 300 mg oral capsule: 1 cap(s) orally 3 times a day  heparin flush 100 units/mL intravenous solution: 3 milliliter(s) flush through PICC once a day after antibiotics  insulin glargine: 20 unit(s) subcutaneous once a day (at bedtime)  insulin lispro (concentrated) 200 units/mL subcutaneous solution:  Insulin lispro sliding scale with every meal and at bedtime:   2 Unit(s) if Glucose 151 - 200  4 Unit(s) if Glucose 201 - 250  6 Unit(s) if Glucose 251 - 300  8 Unit(s) if Glucose 301 - 350  10 Unit(s) if Glucose 351 - 400  12 Unit(s) if Glucose Greater Than 400    insulin lispro 100 units/mL injectable solution: 4 unit(s) injectable 3 times a day (before meals)  metoprolol succinate 25 mg oral tablet, extended release: 1 tab(s) orally once a day  morphine 15 mg oral tablet: 1 tab(s) orally every 4 hours, As needed, Moderate Pain (4 - 6)  morphine 15 mg/8 to 12 hr oral tablet, extended release: 1 tab(s) orally once a day  morphine 30 mg oral tablet: 1 tab(s) orally every 4 hours, As needed, Severe Pain (7 - 10)  polyethylene glycol 3350 oral powder for reconstitution: 17 gram(s) orally once a day as needed for constipation  prasugrel 10 mg oral tablet: 1 tab(s) orally once a day  rifAMPin 300 mg oral capsule: 1 cap(s) orally every 12 hours - tentative end date of 10/29/2020  senna oral tablet: 2 tab(s) orally once a day (at bedtime), As needed, Constipation  sertraline 25 mg oral tablet: 1 tab(s) orally once a day  spironolactone 25 mg oral tablet: 1 tab(s) orally once a day  tamsulosin 0.4 mg oral capsule: 1 cap(s) orally once a day (at bedtime)  vancomycin 1.25 g intravenous injection: 1.25 gram(s) intravenous every 24 hours - tentative end date of 10/29/2020

## 2020-10-06 NOTE — DISCHARGE NOTE PROVIDER - NSDCFUSCHEDAPPT_GEN_ALL_CORE_FT
FLOWER MARISCAL ; 11/10/2020 ; [facility] [address] [facility] FLOWER MARISCAL ; 11/03/2020 ; N[facility] Med  [address]  FLOWER MARISCAL ; 11/10/2020 ; [facility] [address]

## 2020-10-06 NOTE — DISCHARGE NOTE PROVIDER - PROVIDER TOKENS
PROVIDER:[TOKEN:[[id number]],FOLLOWUP:[1 week]],PROVIDER:[TOKEN:[[id number]],FOLLOWUP:[2 weeks]],PROVIDER:[TOKEN:[[id number]],FOLLOWUP:[1 week]],PROVIDER:[TOKEN:[[id number]],FOLLOWUP:[Routine]],PROVIDER:[TOKEN:[[id number]],FOLLOWUP:[1 month]],PROVIDER:[TOKEN:[[id number]],FOLLOWUP:[Routine]]

## 2020-10-06 NOTE — DISCHARGE NOTE PROVIDER - NSDCFUADDAPPT_GEN_ALL_CORE_FT
***A CBC with diff, CMP, CRP, ESR, Vancomycin trough should be drawn weekly and faxed to Dr. Bradshaw's office at [phone]***

## 2020-10-06 NOTE — DISCHARGE NOTE PROVIDER - HOSPITAL COURSE
Admitted 9/1/20 with increased R knee pain and swelling; and drainage from past RUQ drain site. Discharged to rehab on 10/3/2020 and returned later the same day due to worsening pain.    RUQ drain site abcess- General surgery consulted on admission. RUQ cultures taken and ID following. Ultrasound/ Hida scan and gallium scan done. I+D was performed on 9/4/20. The incision stopped draining for a few days, but then the wound began draining thicker, yellow fluid. A repeat ultrasound was done on 9/22 which was inconclusive, and then a repeat CT of the abdomen was done on 9/23/20. Patient will require an elective cholecystectomy in the near future. To follow-up with general surgery. (Dr. Latif)    RUE swelling- A doppler was performed on 9/23/20 to rule out RUE DVT and was negative.     Internal medicine consulted (Dr. Kwok) for pre-op orthopedic clearance and hospital co-management.    Cardiology (Dr. Caldera) consulted for pre-op op clearance and continued cardiology recs    Plastic Surgery (Dr. Mckeon) consulted for LLE and RLE wound management. Dr Rajinder Mckeon performed gastrocnemius flap on R knee incision during surgery on 9/18/20. A skin graft was also placed over LLE donor site. Wound vacs were on in the immediate post-op period for wound healing. This was removed and patient now has a dressing covering the left leg wound. This should be covered with bacitracin, adaptec, ABD pads and kerlix gauze and should be changed daily. The right knee should be gently wrapped with an ace wrap at all times.     Psych consulted for feelings of depression- Zoloft 25mg qd started    Pain management consulted- continued adjustments made throughout hospital course;  recommended morphine PO, gabapentin 300mg three times a day, gabapentin and MSContin.     Vascular consulted (Dr. Malloy)- Continue to follow outpatient    ID consulted (Dr. Casillas)- on vancomycin 1.25g IV q24h with tentative end date of 10/29, and rifampin 300mg q12h with tentative end date of 10/29. Please get CBC with diff, CMP, CRP, ESR, Vancomycin trough weekly and fax to Dr. Bradshaw's office at [phone].     Podiatry Consult- diabetic foot ulcers, debrided on 9/16 and 9/22 bactroban + dressing applied; dressing changes done by podiatry    Nutrition services consulted due to protein calorie malnutrition    Surgical procedures during previous admission include:  R hip exchange of hardware 9/4/20  R Knee Revision Antibiotic Spacer by Dr. Castro, R Knee medial gastroc flap and LLE STSG by Dr. Mckeon on 9/18   Admitted 9/1/20 with increased R knee pain and swelling; and drainage from past RUQ drain site. Discharged to rehab on 10/3/2020 and returned later the same day due to worsening pain.    RUQ drain site abcess- General surgery consulted on admission. RUQ cultures taken and ID following. Ultrasound/ Hida scan and gallium scan done. I+D was performed on 9/4/20. The incision stopped draining for a few days, but then the wound began draining thicker, yellow fluid. A repeat ultrasound was done on 9/22 which was inconclusive, and then a repeat CT of the abdomen was done on 9/23/20. Patient will require an elective cholecystectomy in the near future. To follow-up with general surgery. (Dr. Latif)    RUE swelling- A doppler was performed on 9/23/20 to rule out RUE DVT and was negative.     Internal medicine consulted (Dr. Kwok) for pre-op orthopedic clearance and hospital co-management.    Cardiology (Dr. Caldera) consulted for pre-op op clearance and continued cardiology recs    Plastic Surgery (Dr. Mckeon) consulted for LLE and RLE wound management. Dr Rajinder Mckeon performed gastrocnemius flap on R knee incision during surgery on 9/18/20. A skin graft was also placed over LLE donor site. Wound vacs were on in the immediate post-op period for wound healing. These were removed and patient now has a dressing covering the skin graft sites. These wounds should be covered with bacitracin, adaptec, ABD pads and kerlix gauze and should be changed daily. The right knee should be gently wrapped with an ace wrap at all times.     Psych consulted for feelings of depression- Zoloft 25mg qd started    Pain management consulted- continued adjustments made throughout hospital course;  recommended morphine PO, gabapentin 300mg three times a day, gabapentin and MSContin.     Vascular consulted (Dr. Malloy)- Continue to follow outpatient    ID consulted (Dr. Casillas)- on vancomycin 1.25g IV q24h with tentative end date of 10/29, and rifampin 300mg q12h with tentative end date of 10/29. Please get CBC with diff, CMP, CRP, ESR, Vancomycin trough weekly and fax to Dr. Bradshaw's office at [phone].     Podiatry Consult- diabetic foot ulcers, debrided on 9/16 and 9/22 bactroban + dressing applied; dressing changes done by podiatry    Nutrition services consulted due to protein calorie malnutrition    Surgical procedures during previous admission include:  R hip exchange of hardware 9/4/20  R Knee Revision Antibiotic Spacer by Dr. Castro, R Knee medial gastroc flap and LLE STSG by Dr. Mckeon on 9/18   Patient is a 64 yo M admitted for R knee pain, drainage s/p right knee arthroscopic I+D 7/17, right knee explant and abx spacer 7/22 with Dr. Castro;  and debridement of LLE wound with wound vac placement on 7/30 by Dr. Bowen. Was original d/c'd home on 8/10/20 with PICC and vancomycin IV q12h + PO rifampin.  Readmitted due to pain and s/p R hip exchange of hardware on 09/04/2020; bedside I+D of RUQ by General Surgery on 09/04/2020; R Knee Revision Antibiotic Spacer by Dr. Castro, R Knee medial gastroc flap and LLE STSG by Dr. Mckeon on 9/18. Discharged to [facility] on 10/3/20 and returned to [facility] ER the same day with reports of uncontrolled pain and lack of medical treatment at [facility].    Specific Information from admission on 09/01/2020 to discharge:  RUQ drain site abcess- General surgery consulted on admission. RUQ cultures taken and ID following. Ultrasound/ Hida scan and gallium scan done. I+D was performed on 9/4/20. The incision stopped draining for a few days, but then the wound began draining thicker, yellow fluid. A repeat ultrasound was done on 9/22 which was inconclusive, and then a repeat CT of the abdomen was done on 9/23/20. Patient will require an elective cholecystectomy in the near future. To follow-up with general surgery. (Dr. Latif)    RUE swelling- A doppler was performed on 9/23/20 to rule out RUE DVT and was negative.     Internal medicine consulted (Dr. Kwok) for pre-op orthopedic clearance and hospital co-management.    Cardiology (Dr. Caldera) consulted for pre-op op clearance and continued cardiology recs    Plastic Surgery (Dr. Mckeon) consulted for LLE and RLE wound management. Dr Rajinder Mckeon performed gastrocnemius flap on R knee incision during surgery on 9/18/20. A skin graft was also placed over LLE donor site. Wound vacs were on in the immediate post-op period for wound healing. These were removed and patient now has a dressing covering the skin graft sites. These wounds should be covered with bacitracin, adaptec, ABD pads and kerlix gauze and should be changed daily. The graft below the right knee should be gently wrapped with an ace wrap at all times as well.     Psych consulted for feelings of depression- Zoloft 25mg qd started    Pain management consulted- continued adjustments made throughout hospital course;  recommended morphine PO, gabapentin 300mg three times a day, gabapentin and MSContin.     Vascular consulted (Dr. Malloy)- Continue to follow outpatient    ID consulted (Dr. Casillas)- on vancomycin 1.25g IV q24h with tentative end date of 10/29, and rifampin 300mg q12h with tentative end date of 10/29. Please get CBC with diff, CMP, CRP, ESR, Vancomycin trough weekly and fax to Dr. Bradshaw's office at [phone].     Podiatry Consult- diabetic foot ulcers, debrided on 9/16 and 9/22 bactroban + dressing applied; dressing changes done by podiatry    Nutrition services consulted due to protein calorie malnutrition

## 2020-10-06 NOTE — CONSULT NOTE ADULT - SUBJECTIVE AND OBJECTIVE BOX
CHIEF COMPLAINT:  Dyspnea  HISTORY OF PRESENT ILLNESS:  Juanito Blas is a 64y/o M with PMHx of CAD (s/p CABG 2018), CHF (EF 25%) Diabetes, HLD, HTN, R Revision Total Knee Arthroplasty with Antibiotic Spacer on 7/22 by Dr. Castro for Prosthetic Joint Infection, presents with 2-3 days of worsening R knee pain and swelling. Pt denies any trauma to R knee. Pt denies any numbness/tingling. Notes minimal drainage from incision, no purulence. Pt denies any recent illness. Pt was discharged last hospital visit 3 weeks ago after multiple revision knee surgeries complicated by prosthetic joint infection and bacteremia. Sent home with PICC line, getting vancomycin 1000 Q12 and Rifampin 300 Q12 daily. Reports compliance with medication. Denies any chest pain/SOB/fevers/chills. The patient had bypass of his LAD in June of 2018. In November of 2018 the patient had a stent placed in his right coronary artery and  first obtuse marginal at two sittings.  The patient dyspnea during his recent hospitalization which responded to furosemide. The patient describes shortness of breath the day before admission. He denies chest discomfort palpitations or dizziness.  Chart, PMH, medication and allergies reviewed  PAST MEDICAL & SURGICAL HISTORY:  Diabetic neuropathy    STEMI (ST elevation myocardial infarction)    Diverticulitis    MRSA bacteremia    History of celiac disease    CHF (congestive heart failure)  EF ~ 25%    HTN (hypertension)    Diabetes  on insulin pump    Blood clot due to device, implant, or graft  was on blood thinners    HLD (hyperlipidemia)    Osteoarthritis    Atherosclerosis of coronary artery  CAD (coronary artery disease)    Status post percutaneous transluminal coronary angioplasty  in 2012    History of open reduction and internal fixation (ORIF) procedure    Surgery, elective  Right shoulder    Surgery, elective  right knee wound debridement    S/P TKR (total knee replacement), right  with infection Mrsa   per pt he was cleared from MRSA infection    S/P CABG x 1  2018    Stented coronary artery  10/18 heart attack  INFERIOR WALL MI    Other postprocedural status  Fixation hardware in foot LEFT        [ x ] Diabetes   [  ] Hypertension  [x  ] Hyperlipidemia  [x] CAD  [x  ] PCI  [x  ] CABG    PREVIOUS DIAGNOSTIC TESTING:    [ x ] Echocardiogram:  [ x ]  Catheterization:  [x  ] Stress Test:  	    MEDICATIONS:  MEDICATIONS  (STANDING):  aspirin 81 milliGRAM(s) Oral daily  atorvastatin 40 milliGRAM(s) Oral at bedtime  dextrose 5%. 1000 milliLiter(s) (50 mL/Hr) IV Continuous <Continuous>  dextrose 50% Injectable 12.5 Gram(s) IV Push once  dextrose 50% Injectable 25 Gram(s) IV Push once  dextrose 50% Injectable 25 Gram(s) IV Push once  digoxin     Tablet 0.125 milliGRAM(s) Oral daily  DULoxetine 30 milliGRAM(s) Oral daily  furosemide    Tablet 20 milliGRAM(s) Oral daily  gabapentin 300 milliGRAM(s) Oral three times a day  insulin glargine Injectable (LANTUS) 10 Unit(s) SubCutaneous at bedtime  insulin lispro (HumaLOG) corrective regimen sliding scale   SubCutaneous Before meals and at bedtime  insulin lispro Injectable (HumaLOG) 4 Unit(s) SubCutaneous three times a day before meals  metoprolol succinate ER 25 milliGRAM(s) Oral daily  morphine ER Tablet 15 milliGRAM(s) Oral daily  polyethylene glycol 3350 17 Gram(s) Oral daily  prasugrel 10 milliGRAM(s) Oral daily  rifAMPin 300 milliGRAM(s) Oral every 12 hours  sertraline 25 milliGRAM(s) Oral daily  spironolactone 25 milliGRAM(s) Oral daily  tamsulosin 0.4 milliGRAM(s) Oral at bedtime  vancomycin  IVPB 1250 milliGRAM(s) IV Intermittent every 24 hours      FAMILY HISTORY:  Family history of allergies  daughter    Family history of heart disease (Mother)        SOCIAL HISTORY:    [  ] Never smoker  [  ] Non-smoker  [  ] Smoker  [ x ] Social alcohol use  [ x ] Former smoker    Allergies    ACE inhibitors (Hives)  carvedilol (Other)  enalapril (Hives)  Entresto (Other)    Intolerances    	    REVIEW OF SYSTEMS:  CONSTITUTIONAL: No fever, weight loss, or fatigue  EYES: No eye pain, visual disturbances, or discharge  ENT:  No difficulty hearing, tinnitus, vertigo; No sinus or throat pain  NECK: No pain or stiffness  PULMONARY: No cough, no wheezing, chills or hemoptysis; +Shortness of Breath  CARDIOVASCULAR: No chest pain, no  palpitations, no passing out, dizziness, no leg swelling  GASTROINTESTINAL: No abdominal  pain. No nausea, vomiting, or hematemesis; No diarrhea or constipation. No melena or hematochezia.  GENITOURINARY: No dysuria, frequency, hematuria, or incontinence  NEUROLOGICAL: No headaches, memory loss, loss of strength, numbness, or tremors  SKIN: No itching, burning, rashes, or lesions   LYMPH Nodes: No enlarged glands  ENDOCRINE: No heat or cold intolerance; No hair loss  MUSCULOSKELETAL: No joint pain or swelling; No muscle, back, or extremity pain  PSYCHIATRIC: No depression, anxiety, mood swings, or difficulty sleeping  HEME/LYMPH: No easy bruising, or bleeding gums  ALLERGY AND IMMUNOLOGIC: No hives or eczema	    PHYSICAL EXAM:  T(C): 36.8 (10-06-20 @ 06:32), Max: 36.8 (10-05-20 @ 14:34)  HR: 74 (10-06-20 @ 06:32) (68 - 87)  BP: 105/56 (10-06-20 @ 06:32) (94/50 - 111/56)  RR: 18 (10-06-20 @ 06:32) (17 - 19)  SpO2: 98% (10-06-20 @ 06:32) (94% - 98%)  Wt(kg): --  I&O's Summary    05 Oct 2020 07:01  -  06 Oct 2020 07:00  --------------------------------------------------------  IN: 970 mL / OUT: 751 mL / NET: 219 mL        Appearance: Normal	  HEENT:   Normal oral mucosa, PERRL, EOMI	  Lymphatic: No lymphadenopathy  Cardiovascular: Normal S1 S2, No JVD, No murmur, 1+edema L  Pulmonary: Lungs clear to auscultation	  Psychiatry: A & O x 3, Mood & affect appropriate  Gastrointestinal:  Soft, Non-tender, + BS	  Skin: No rashes, No ecchymoses, No cyanosis	  Neurologic: Non-focal  Extremities: Normal range of motion, No clubbing or cyanosis  	 	  CARDIAC MARKERS:                                8.3    9.33  )-----------( 487      ( 05 Oct 2020 06:22 )             29.6     10-05    134<L>  |  98  |  18  ----------------------------<  204<H>  4.5   |  27  |  0.83    Ca    7.8<L>      05 Oct 2020 06:22      proBNP:  Lipid Profile:  HgA1c:  TSH:    TELEMETRY: 	A sense V pace    ECG:  	QTC  RADIOLOGY:	    ASSESSMENT/PLAN: 	    Severely reduced ejection fraction-Dyspnea is improved after furosemide.   Mild edema. Continue metoprolol digoxin furosemide and spironolactone.    Coronary artery disease-the patient denies chest discomfort.  continue prasugrel and aspirin.     Ventiricular tachycardia/Defibrillator- asymptomatic. No VT on monitor. ICD recently checked. No RX needed. Consider D/C monitor.     Hyponatremia- mild.    Septic knee- as per ID and orthopedics.     Discussed with PA Rebecca Goldberg

## 2020-10-06 NOTE — PROGRESS NOTE ADULT - SUBJECTIVE AND OBJECTIVE BOX
Orthopaedic Surgery Progress Note    Patient seen and examined. ANAHI. Patient without complaints. Pain controlled this AM. Denies CP, SOB, N/V, tactile fevers, calf pain.      Vital Signs Last 24 Hrs  T(C): 36.7 (05 Oct 2020 21:47), Max: 36.8 (05 Oct 2020 14:34)  T(F): 98.1 (05 Oct 2020 21:47), Max: 98.3 (05 Oct 2020 14:34)  HR: 68 (06 Oct 2020 01:11) (68 - 87)  BP: 95/51 (06 Oct 2020 01:11) (94/50 - 111/57)  BP(mean): --  RR: 18 (06 Oct 2020 01:11) (17 - 19)  SpO2: 98% (06 Oct 2020 01:11) (94% - 98%)      Physical Exam:  Pt laying comfortably in bed, NAD.  Skin warm and well perfused, no visible erythema/ecchymoses.  Dressing C/D/I RLE, seamus in place - left lower extremity STSG dressing CDI  SLT in tact to distal bilateral lower extremities  EHL/FHL/TA/GS firing bilateral lower extremities  Calves soft and nontender to palpation  DP pulses palpable bilaterally     LABS                        8.3    9.33  )-----------( 487      ( 05 Oct 2020 06:22 )             29.6                                10-05    134<L>  |  98  |  18  ----------------------------<  204<H>  4.5   |  27  |  0.83    Ca    7.8<L>      05 Oct 2020 06:22  Phos  3.5     10-03  Mg     2.0     10-03    TPro  7.3  /  Alb  2.8<L>  /  TBili  0.5  /  DBili  x   /  AST  See Note  /  ALT  See Note  /  AlkPhos  175<H>  10-03      A/P: 65M s/p right knee antibiotic spacer on 7/122, s/p revision , Right gastroc flap, left STSG on 9/18.       1. PT: TTWB RLE   2. DVT prophylaxis: ASA 81 BID, Effient 10 QD  3. Pain Control as needed  - pain management recs to continue previous regimen, no IV - will see patient today  4. Dispo: PT re-eval, pt agreeable to [facility], but only wants to go to [place]

## 2020-10-06 NOTE — PROGRESS NOTE ADULT - ASSESSMENT
A/P: Pt doing well s/p muscle flap closure of right knee and left leg skin graft.   1. Adaptec, baci over skin graft sites. Ace wrap to right leg below knee.   2. Will follow along

## 2020-10-06 NOTE — CONSULT NOTE ADULT - SUBJECTIVE AND OBJECTIVE BOX
Pain Management Consult Note - [facility] [phone]    Chief Complaint: Lower Back Pain,     HPI:  65yMale admitted for R knee pain, drainage s/p right knee arthroscopic I+D 7/17, right knee explant and abx spacer 7/22 with Dr. Castro;  and debridement of LLE wound with wound vac placement on 7/30 by Dr. Bowen. Was original d/c'd home on 8/10/20 with PICC and vancomycin IV q12h + rifampin.  Readmitted and s/p R hip exchange of hardware on 09/04/2020; I+D of RUQ on 09/04/2020; R Knee Revision Antibiotic Spacer by Dr. Castro, R Knee medial gastroc flap and LLE STSG by Dr. Mckeon on 9/18. Discharged to [facility] on 10/3/20 and returned to [facility] ER the same day with reports of uncontrolled pain and lack of medical treatment at [facility]. Patient reports lower back pain and RLE pain, pain not well controlled with current pain medication regimen. Patient denies any secondary side effects with current pain medication regimen. Reviewed pain medication regimen with patient at bedside. Patients previous regimen, mscontin 15mg PO daily, morphine 30mg PO Q4h prn severe pain, tylenol 975mg PO Q8, gabapentin 300mg PO TID        Pain is __x_ sharp ____dull ___burning _x__achy ___ Intensity: ____ mild _x_mod ___severe     Location ____surgical site ____cervical _____lumbar ____abd ____upper ext___x_R lower ext    Worse with __x__activity __x__movement _____physical therapy___ Rest    Improved with _x___medication _x___rest ____physical therapy      ROS: Const:  _-__febrile   Eyes:___ENT:___CV: _-__chest pain  Resp: __-__sob  GI:_-__nausea _-__vomiting _-__abd pain ___npo ___clears x__full diet __bm  :___ [place]: _x__pain _-__spasm  Skin:___ Neuro:  _-__pdtmxssa_-__tqvzpxxyt__-_ numbness _-__weakness _-__paresth  Psych:__anxiety  Endo:___ Heme:___Allergy:_________, _x__all others reviewed and negative      PAST MEDICAL & SURGICAL HISTORY:  Diabetic neuropathy  STEMI (ST elevation myocardial infarction)  Diverticulitis  MRSA bacteremia  History of celiac disease  CHF (congestive heart failure)  EF ~ 25%  HTN (hypertension)  Diabetes  on insulin pump  Blood clot due to device, implant, or graft  was on blood thinners  HLD (hyperlipidemia)  Osteoarthritis  Atherosclerosis of coronary artery  CAD (coronary artery disease)  Status post percutaneous transluminal coronary angioplasty  in 2012  History of open reduction and internal fixation (ORIF) procedure  Surgery, elective  Right shoulder  Surgery, elective  right knee wound debridement  S/P TKR (total knee replacement), right  with infection Mrsa   per pt he was cleared from MRSA infection  S/P CABG x 1  2018  Stented coronary artery  10/18 heart attack  INFERIOR WALL MI  Other postprocedural status  Fixation hardware in foot LEFT        SH: _-__Tobacco   _-__Alcohol                          FH:FAMILY HISTORY:  Family history of allergies  daughter  Family history of heart disease (Mother)      acetaminophen   Tablet .. 1000 milliGRAM(s) Oral every 8 hours PRN  aluminum hydroxide/magnesium hydroxide/simethicone Suspension 30 milliLiter(s) Oral four times a day PRN  aspirin 81 milliGRAM(s) Oral daily  atorvastatin 40 milliGRAM(s) Oral at bedtime  BACItracin   Ointment 1 Application(s) Topical daily  BACItracin   Ointment 1 Application(s) Topical daily  bisacodyl Suppository 10 milliGRAM(s) Rectal daily PRN  cyclobenzaprine 5 milliGRAM(s) Oral three times a day PRN  dextrose 40% Gel 15 Gram(s) Oral once PRN  dextrose 5%. 1000 milliLiter(s) IV Continuous <Continuous>  dextrose 50% Injectable 12.5 Gram(s) IV Push once  dextrose 50% Injectable 25 Gram(s) IV Push once  dextrose 50% Injectable 25 Gram(s) IV Push once  digoxin     Tablet 0.125 milliGRAM(s) Oral daily  DULoxetine 30 milliGRAM(s) Oral daily  furosemide    Tablet 20 milliGRAM(s) Oral daily  gabapentin 300 milliGRAM(s) Oral three times a day  glucagon  Injectable 1 milliGRAM(s) IntraMuscular once PRN  insulin glargine Injectable (LANTUS) 10 Unit(s) SubCutaneous at bedtime  insulin lispro (HumaLOG) corrective regimen sliding scale   SubCutaneous Before meals and at bedtime  insulin lispro Injectable (HumaLOG) 4 Unit(s) SubCutaneous three times a day before meals  magnesium hydroxide Suspension 30 milliLiter(s) Oral daily PRN  metoprolol succinate ER 25 milliGRAM(s) Oral daily  morphine  IR 30 milliGRAM(s) Oral every 4 hours PRN  morphine ER Tablet 15 milliGRAM(s) Oral daily  ondansetron Injectable 4 milliGRAM(s) IV Push every 6 hours PRN  polyethylene glycol 3350 17 Gram(s) Oral daily  prasugrel 10 milliGRAM(s) Oral daily  rifAMPin 300 milliGRAM(s) Oral every 12 hours  senna 2 Tablet(s) Oral at bedtime PRN  sertraline 25 milliGRAM(s) Oral daily  spironolactone 25 milliGRAM(s) Oral daily  tamsulosin 0.4 milliGRAM(s) Oral at bedtime  vancomycin  IVPB 1250 milliGRAM(s) IV Intermittent every 24 hours      T(C): 37.1 (10-06-20 @ 14:10), Max: 37.1 (10-06-20 @ 14:10)  HR: 80 (10-06-20 @ 14:10) (68 - 90)  BP: 100/58 (10-06-20 @ 14:10) (95/51 - 116/61)  RR: 17 (10-06-20 @ 14:10) (16 - 18)  SpO2: 98% (10-06-20 @ 14:10) (95% - 98%)  Wt(kg): --    T(C): 37.1 (10-06-20 @ 14:10), Max: 37.1 (10-06-20 @ 14:10)  HR: 80 (10-06-20 @ 14:10) (68 - 90)  BP: 100/58 (10-06-20 @ 14:10) (95/51 - 116/61)  RR: 17 (10-06-20 @ 14:10) (16 - 18)  SpO2: 98% (10-06-20 @ 14:10) (95% - 98%)  Wt(kg): --    T(C): 37.1 (10-06-20 @ 14:10), Max: 37.1 (10-06-20 @ 14:10)  HR: 80 (10-06-20 @ 14:10) (68 - 90)  BP: 100/58 (10-06-20 @ 14:10) (95/51 - 116/61)  RR: 17 (10-06-20 @ 14:10) (16 - 18)  SpO2: 98% (10-06-20 @ 14:10) (95% - 98%)  Wt(kg): --      PHYSICAL EXAM:  Gen Appearance: x___no acute distress _x__appropriate        Neuro: _x__SILT feet____ EOM Intact Psych: AAOX_3_, _x__mood/affect appropriate        Eyes: _x__conjunctiva WNL  __x___ Pupils equal and round        ENT: _x__ears and nose atraumatic__x_ Hearing grossly intact        Neck: _x__trachea midline, no visible masses ___thyroid without palpable mass    Resp: _x__Nml WOB____No tactile fremitus ___clear to auscultation    Cardio: _x__extremities free from edema __x__pedal pulses palpable    GI/Abdomen: __x_soft ___x__ Nontender__x____Nondistended_____HSM    Lymphatic: ___no palpable nodes in neck  ___no palpable nodes calves and feet    Skin/Wound: ___Incision, ___Dressing c/d/i,   ____surrounding tissues soft,  ___drain/chest tube present____    Muscular: EHL _4__/5  Gastrocnemius__4_/5    ___absent clubbing/cyanosis        ASSESSMENT: As per HPI: 65yMale admitted for R knee pain, drainage s/p right knee arthroscopic I+D 7/17, right knee explant and abx spacer 7/22 with Dr. Castro;  and debridement of LLE wound with wound vac placement on 7/30 by Dr. Bowen. Was original d/c'd home on 8/10/20 with PICC and vancomycin IV q12h + rifampin.  Readmitted and s/p R hip exchange of hardware on 09/04/2020; I+D of RUQ on 09/04/2020; R Knee Revision Antibiotic Spacer by Dr. Castro, R Knee medial gastroc flap and LLE STSG by Dr. Mckeon on 9/18. Discharged to [facility] on 10/3/20 and returned to [facility] ER the same day with reports of uncontrolled pain and lack of medical treatment at [facility]. Patient reports RLE pain and lower back pain.       Recommended Treatment PLAN:  1. Morphine 15-30mg PO Q4h prn moderate to severe pain  2. Morphine 2mg IVP prn before PT  3. Mscontin 15mg PO BID  4. Gabapentin 400mg PO TID  5. tylenol 975mg PO Q8  Plan discussed with Dr. Chavez

## 2020-10-07 LAB
ALBUMIN SERPL ELPH-MCNC: 2.3 G/DL — LOW (ref 3.3–5)
ALP SERPL-CCNC: 178 U/L — HIGH (ref 40–120)
ALT FLD-CCNC: 15 U/L — SIGNIFICANT CHANGE UP (ref 10–45)
ANION GAP SERPL CALC-SCNC: 9 MMOL/L — SIGNIFICANT CHANGE UP (ref 5–17)
AST SERPL-CCNC: 14 U/L — SIGNIFICANT CHANGE UP (ref 10–40)
BILIRUB SERPL-MCNC: 0.4 MG/DL — SIGNIFICANT CHANGE UP (ref 0.2–1.2)
BUN SERPL-MCNC: 18 MG/DL — SIGNIFICANT CHANGE UP (ref 7–23)
CALCIUM SERPL-MCNC: 8.2 MG/DL — LOW (ref 8.4–10.5)
CHLORIDE SERPL-SCNC: 100 MMOL/L — SIGNIFICANT CHANGE UP (ref 96–108)
CO2 SERPL-SCNC: 26 MMOL/L — SIGNIFICANT CHANGE UP (ref 22–31)
CREAT SERPL-MCNC: 0.77 MG/DL — SIGNIFICANT CHANGE UP (ref 0.5–1.3)
CRP SERPL-MCNC: 6.2 MG/DL — HIGH (ref 0–0.4)
ERYTHROCYTE [SEDIMENTATION RATE] IN BLOOD: 51 MM/HR — HIGH
GLUCOSE BLDC GLUCOMTR-MCNC: 122 MG/DL — HIGH (ref 70–99)
GLUCOSE BLDC GLUCOMTR-MCNC: 150 MG/DL — HIGH (ref 70–99)
GLUCOSE BLDC GLUCOMTR-MCNC: 161 MG/DL — HIGH (ref 70–99)
GLUCOSE BLDC GLUCOMTR-MCNC: 176 MG/DL — HIGH (ref 70–99)
GLUCOSE SERPL-MCNC: 143 MG/DL — HIGH (ref 70–99)
HCT VFR BLD CALC: 30.9 % — LOW (ref 39–50)
HGB BLD-MCNC: 8.6 G/DL — LOW (ref 13–17)
MCHC RBC-ENTMCNC: 21.1 PG — LOW (ref 27–34)
MCHC RBC-ENTMCNC: 27.8 GM/DL — LOW (ref 32–36)
MCV RBC AUTO: 75.7 FL — LOW (ref 80–100)
NRBC # BLD: 0 /100 WBCS — SIGNIFICANT CHANGE UP (ref 0–0)
PLATELET # BLD AUTO: 480 K/UL — HIGH (ref 150–400)
POTASSIUM SERPL-MCNC: 4.5 MMOL/L — SIGNIFICANT CHANGE UP (ref 3.5–5.3)
POTASSIUM SERPL-SCNC: 4.5 MMOL/L — SIGNIFICANT CHANGE UP (ref 3.5–5.3)
PROT SERPL-MCNC: 6.5 G/DL — SIGNIFICANT CHANGE UP (ref 6–8.3)
RBC # BLD: 4.08 M/UL — LOW (ref 4.2–5.8)
RBC # FLD: 23 % — HIGH (ref 10.3–14.5)
SODIUM SERPL-SCNC: 135 MMOL/L — SIGNIFICANT CHANGE UP (ref 135–145)
WBC # BLD: 8.43 K/UL — SIGNIFICANT CHANGE UP (ref 3.8–10.5)
WBC # FLD AUTO: 8.43 K/UL — SIGNIFICANT CHANGE UP (ref 3.8–10.5)

## 2020-10-07 RX ORDER — MORPHINE SULFATE 50 MG/1
15 CAPSULE, EXTENDED RELEASE ORAL EVERY 4 HOURS
Refills: 0 | Status: DISCONTINUED | OUTPATIENT
Start: 2020-10-07 | End: 2020-10-13

## 2020-10-07 RX ORDER — MORPHINE SULFATE 50 MG/1
2 CAPSULE, EXTENDED RELEASE ORAL ONCE
Refills: 0 | Status: DISCONTINUED | OUTPATIENT
Start: 2020-10-07 | End: 2020-10-07

## 2020-10-07 RX ORDER — MORPHINE SULFATE 50 MG/1
30 CAPSULE, EXTENDED RELEASE ORAL
Refills: 0 | Status: DISCONTINUED | OUTPATIENT
Start: 2020-10-07 | End: 2020-10-09

## 2020-10-07 RX ADMIN — MORPHINE SULFATE 30 MILLIGRAM(S): 50 CAPSULE, EXTENDED RELEASE ORAL at 07:19

## 2020-10-07 RX ADMIN — MORPHINE SULFATE 15 MILLIGRAM(S): 50 CAPSULE, EXTENDED RELEASE ORAL at 22:24

## 2020-10-07 RX ADMIN — GABAPENTIN 400 MILLIGRAM(S): 400 CAPSULE ORAL at 06:46

## 2020-10-07 RX ADMIN — Medication 1: at 22:10

## 2020-10-07 RX ADMIN — Medication 1 APPLICATION(S): at 12:22

## 2020-10-07 RX ADMIN — Medication 4 UNIT(S): at 12:16

## 2020-10-07 RX ADMIN — MORPHINE SULFATE 30 MILLIGRAM(S): 50 CAPSULE, EXTENDED RELEASE ORAL at 06:46

## 2020-10-07 RX ADMIN — ATORVASTATIN CALCIUM 40 MILLIGRAM(S): 80 TABLET, FILM COATED ORAL at 22:11

## 2020-10-07 RX ADMIN — MORPHINE SULFATE 30 MILLIGRAM(S): 50 CAPSULE, EXTENDED RELEASE ORAL at 11:03

## 2020-10-07 RX ADMIN — DULOXETINE HYDROCHLORIDE 30 MILLIGRAM(S): 30 CAPSULE, DELAYED RELEASE ORAL at 12:15

## 2020-10-07 RX ADMIN — Medication 166.67 MILLIGRAM(S): at 22:10

## 2020-10-07 RX ADMIN — MORPHINE SULFATE 2 MILLIGRAM(S): 50 CAPSULE, EXTENDED RELEASE ORAL at 14:18

## 2020-10-07 RX ADMIN — INSULIN GLARGINE 10 UNIT(S): 100 INJECTION, SOLUTION SUBCUTANEOUS at 22:11

## 2020-10-07 RX ADMIN — Medication 20 MILLIGRAM(S): at 06:46

## 2020-10-07 RX ADMIN — MORPHINE SULFATE 15 MILLIGRAM(S): 50 CAPSULE, EXTENDED RELEASE ORAL at 14:18

## 2020-10-07 RX ADMIN — Medication 4 UNIT(S): at 08:05

## 2020-10-07 RX ADMIN — Medication 0.12 MILLIGRAM(S): at 06:46

## 2020-10-07 RX ADMIN — MORPHINE SULFATE 15 MILLIGRAM(S): 50 CAPSULE, EXTENDED RELEASE ORAL at 01:59

## 2020-10-07 RX ADMIN — MORPHINE SULFATE 15 MILLIGRAM(S): 50 CAPSULE, EXTENDED RELEASE ORAL at 15:13

## 2020-10-07 RX ADMIN — MORPHINE SULFATE 30 MILLIGRAM(S): 50 CAPSULE, EXTENDED RELEASE ORAL at 12:00

## 2020-10-07 RX ADMIN — SPIRONOLACTONE 25 MILLIGRAM(S): 25 TABLET, FILM COATED ORAL at 06:46

## 2020-10-07 RX ADMIN — GABAPENTIN 400 MILLIGRAM(S): 400 CAPSULE ORAL at 22:11

## 2020-10-07 RX ADMIN — Medication 1: at 12:16

## 2020-10-07 RX ADMIN — SERTRALINE 25 MILLIGRAM(S): 25 TABLET, FILM COATED ORAL at 11:03

## 2020-10-07 RX ADMIN — GABAPENTIN 400 MILLIGRAM(S): 400 CAPSULE ORAL at 14:18

## 2020-10-07 RX ADMIN — PRASUGREL 10 MILLIGRAM(S): 5 TABLET, FILM COATED ORAL at 12:16

## 2020-10-07 RX ADMIN — Medication 81 MILLIGRAM(S): at 11:03

## 2020-10-07 RX ADMIN — MORPHINE SULFATE 15 MILLIGRAM(S): 50 CAPSULE, EXTENDED RELEASE ORAL at 02:01

## 2020-10-07 RX ADMIN — MORPHINE SULFATE 2 MILLIGRAM(S): 50 CAPSULE, EXTENDED RELEASE ORAL at 14:40

## 2020-10-07 NOTE — PROGRESS NOTE ADULT - SUBJECTIVE AND OBJECTIVE BOX
Ortho Note    Pt seen and examined. Comfortable this AM, reports graft sites are feeling better. Knee swelling decreased since yesterday. Denies CP/SOB/N/V    Vital Signs Last 24 Hrs  T(C): 36.9 (06 Oct 2020 21:39), Max: 37.1 (06 Oct 2020 14:10)  T(F): 98.4 (06 Oct 2020 21:39), Max: 98.7 (06 Oct 2020 14:10)  HR: 89 (06 Oct 2020 21:39) (80 - 93)  BP: 99/62 (06 Oct 2020 21:39) (99/62 - 116/61)  BP(mean): --  RR: 17 (06 Oct 2020 21:39) (16 - 18)  SpO2: 95% (06 Oct 2020 21:39) (93% - 98%)    RLE:  DSG: R knee ace wrap changed, CDI; flap healing well; no drainage  Pulses: +DP;  feet cool to touch; at baseline; no edema  Sensation: Sensation intact to baseline (diminished sensation- h/o severe neuropathy; follows with vascular Dr. Malloy outpatient)  Motor: 5/5 EHL/FHL    LLE:  DSG: abd pad/ kerlix CDI to vascular wound  Pulses: feet cool to touch; at baseline; no edema  Sensation: SILT to baseline (diminished sensation- h/o severe neuropathy; follows with vascular Dr. Malloy outpatient)  Motor: 5/5 EHL/FHL/TA/GS                          8.3    9.33  )-----------( 487      ( 05 Oct 2020 06:22 )             29.6   05 Oct 2020 06:22    134    |  98     |  18     ----------------------------<  204    4.5     |  27     |  0.83           A/P: 65yMale admitted for R knee pain, drainage s/p right knee arthroscopic I+D 7/17, right knee explant and abx spacer 7/22 with Dr. Castro;  and debridement of LLE wound with wound vac placement on 7/30 by Dr. Bowen. Was original d/c'd home on 8/10/20 with PICC and vancomycin IV q12h + rifampin.  Readmitted and s/p R hip exchange of hardware on 09/04/2020; I+D of RUQ on 09/04/2020; R Knee Revision Antibiotic Spacer by Dr. Castro, R Knee medial gastroc flap and LLE STSG by Dr. Mckeon on 9/18. Discharged to [facility] on 10/3/20 and returned to [facility] ER the same day with reports of uncontrolled pain and lack of medical treatment at [facility]  - Continue to f/u CBC with diff, CMP,  inflammatory markers and vanc troph; plan to f/u with ID Dr. Casillas outpatient; will consult with any change in patient status or lab results  - medical and cardiology co-management; no current need for telemetry monitoring as per recs  - Pain Control- appreciate pain management recs; 1x dose morphine 2mg IV to be given prior to PT today  - DVT ppx: ASA + effient (home regimen)  - PT, WBS: TTWB RLE in seamus brace; WBAT LLE  - dressing changes as per plastics recs (Dr. Calvillo) on 10/2 prior to previous d/c- bacitracin, adaptiq, abd pads to LLE and RLE skin graft sites; RLE to be wrapped loosely in ace wrap  - podiatry debridements from prior admission well-appearing- will reconsult as needed; follow-up outpatient  - RUQ drain site not actively draining at this time- plan to follow-up with Dr. Latif regarding elective cholecystectomy in the future  - OOB for meals as tolerated, aggressive I/S  - bowel regimen  - f/u repeat R tib/fib and R femur x-rays from yesterday  - dispo: [facility] placement      Ortho Pager [phone]

## 2020-10-07 NOTE — PROGRESS NOTE ADULT - SUBJECTIVE AND OBJECTIVE BOX
Pain Management Progress Note - [facility] [phone]      HPI:  65yMale admitted for R knee pain, drainage s/p right knee arthroscopic I+D 7/17, right knee explant and abx spacer 7/22 with Dr. Castro;  and debridement of LLE wound with wound vac placement on 7/30 by Dr. Bowen. Was original d/c'd home on 8/10/20 with PICC and vancomycin IV q12h + rifampin.  Readmitted and s/p R hip exchange of hardware on 09/04/2020; I+D of RUQ on 09/04/2020; R Knee Revision Antibiotic Spacer by Dr. Castro, R Knee medial gastroc flap and LLE STSG by Dr. Mckeon on 9/18. Discharged to [facility] on 10/3/20 and returned to [facility] ER the same day with reports of uncontrolled pain and lack of medical treatment at [facility]. Patient reports lower back pain and RLE pain, pain managed today with current pain medication regimen. Patient Axox3, denies any side effects from current pain medication regimen. Patient R leg wrapped with an ace bandage, cd,i, Patient reports good appetite today, bm today,.        Pain is __x_ sharp ____dull ___burning _x__achy ___ Intensity: __x__ mild _x_mod ___severe     Location ____surgical site ____cervical _____lumbar ____abd ____upper ext___x_R lower ext    Worse with __x__activity __x__movement _____physical therapy___ Rest    Improved with _x___medication _x___rest ____physical therapy      morphine  - Injectable  heparin  Lock Flush 100 Units/mL Injectable  gabapentin  morphine ER Tablet  BACItracin   Ointment  morphine  - Injectable  BACItracin   Ointment  diphenhydrAMINE  morphine  - Injectable  insulin lispro Injectable (HumaLOG)  insulin glargine Injectable (LANTUS)  furosemide   Injectable  glucagon  Injectable  dextrose 50% Injectable  dextrose 50% Injectable  dextrose 50% Injectable  dextrose 40% Gel  dextrose 5%.  insulin lispro (HumaLOG) corrective regimen sliding scale  furosemide    Tablet  metoprolol succinate ER  atorvastatin  sertraline  DULoxetine  digoxin     Tablet  tamsulosin  spironolactone  prasugrel  aspirin  rifAMPin  vancomycin  IVPB  morphine ER Tablet  morphine  - Injectable  gabapentin  cyclobenzaprine  morphine  IR  senna  bisacodyl Suppository  polyethylene glycol 3350  magnesium hydroxide Suspension  ondansetron Injectable  aluminum hydroxide/magnesium hydroxide/simethicone Suspension  oxyCODONE    IR  oxyCODONE    IR  acetaminophen   Tablet ..  morphine  - Injectable      ROS: Const:  _-__febrile   Eyes:___ENT:___CV: _-__chest pain  Resp: __-__sob  GI:_-__nausea _-__vomiting _-__abd pain ___npo ___clears x__full diet __bm  :___ Musk: _x__pain _-__spasm  Skin:___ Neuro:  _-__jikoweyl_-__jbugtmgsw__-_ numbness _-__weakness _-__paresth  Psych:__anxiety  Endo:___ Heme:___Allergy:_________, _x__all others reviewed and negative      PAST MEDICAL & SURGICAL HISTORY:  Diabetic neuropathy  STEMI (ST elevation myocardial infarction)  Diverticulitis  MRSA bacteremia  History of celiac disease  CHF (congestive heart failure)  EF ~ 25%  HTN (hypertension)  Diabetes  on insulin pump  Blood clot due to device, implant, or graft  was on blood thinners  HLD (hyperlipidemia)  Osteoarthritis  Atherosclerosis of coronary artery  CAD (coronary artery disease)  Status post percutaneous transluminal coronary angioplasty  in 2012  History of open reduction and internal fixation (ORIF) procedure  Surgery, elective  Right shoulder  Surgery, elective  right knee wound debridement  S/P TKR (total knee replacement), right  with infection Mrsa   per pt he was cleared from MRSA infection  S/P CABG x 1  2018  Stented coronary artery  10/18 heart attack  INFERIOR WALL MI  Other postprocedural status  Fixation hardware in foot LEFT        10-07 @ 07:3897 mL/min/1.73M2      Hemoglobin: 8.6 g/dL (10-07 @ 07:38)        T(C): 37.1 (10-07-20 @ 09:36), Max: 37.1 (10-06-20 @ 14:10)  HR: 97 (10-07-20 @ 09:36) (77 - 97)  BP: 101/61 (10-07-20 @ 09:36) (93/53 - 106/65)  RR: 17 (10-07-20 @ 09:36) (16 - 18)  SpO2: 97% (10-07-20 @ 09:36) (93% - 98%)  Wt(kg): --     PHYSICAL EXAM:  Gen Appearance: x___no acute distress _x__appropriate        Neuro: _x__SILT feet____ EOM Intact Psych: AAOX_3_, _x__mood/affect appropriate        Eyes: _x__conjunctiva WNL  __x___ Pupils equal and round        ENT: _x__ears and nose atraumatic__x_ Hearing grossly intact        Neck: _x__trachea midline, no visible masses ___thyroid without palpable mass    Resp: _x__Nml WOB____No tactile fremitus ___clear to auscultation    Cardio: _x__extremities free from edema __x__pedal pulses palpable    GI/Abdomen: __x_soft ___x__ Nontender__x____Nondistended_____HSM    Lymphatic: ___no palpable nodes in neck  ___no palpable nodes calves and feet    Skin/Wound: ___Incision, ___Dressing c/d/i,   ____surrounding tissues soft,  ___drain/chest tube present____    Muscular: EHL _4__/5  Gastrocnemius__4_/5    ___absent clubbing/cyanosis        ASSESSMENT: As per HPI: 65yMale admitted for R knee pain, drainage s/p right knee arthroscopic I+D 7/17, right knee explant and abx spacer 7/22 with Dr. Castro;  and debridement of LLE wound with wound vac placement on 7/30 by Dr. Bowen. Was original d/c'd home on 8/10/20 with PICC and vancomycin IV q12h + rifampin.  Readmitted and s/p R hip exchange of hardware on 09/04/2020; I+D of RUQ on 09/04/2020; R Knee Revision Antibiotic Spacer by Dr. Castro, R Knee medial gastroc flap and LLE STSG by Dr. Mckeon on 9/18. Discharged to [facility] on 10/3/20 and returned to [facility] ER the same day with reports of uncontrolled pain and lack of medical treatment at [facility]. Patient reports RLE pain and lower back pain, patient reports moderate pain relief with current pain medication regimen.       Recommended Treatment PLAN:  1. Morphine 15-30mg PO Q4h prn moderate to severe pain  2. Morphine 2mg IVP prn before PT  3. Mscontin 15mg PO BID  4. Gabapentin 400mg PO TID  5. tylenol 975mg PO Q8  Plan discussed with Dr. Chavez     Pain Management Progress Note - [facility] [phone]      HPI:  65yMale admitted for R knee pain, drainage s/p right knee arthroscopic I+D 7/17, right knee explant and abx spacer 7/22 with Dr. Castro;  and debridement of LLE wound with wound vac placement on 7/30 by Dr. Bowen. Was original d/c'd home on 8/10/20 with PICC and vancomycin IV q12h + rifampin.  Readmitted and s/p R hip exchange of hardware on 09/04/2020; I+D of RUQ on 09/04/2020; R Knee Revision Antibiotic Spacer by Dr. Castro, R Knee medial gastroc flap and LLE STSG by Dr. Mckeon on 9/18. Discharged to [facility] on 10/3/20 and returned to [facility] ER the same day with reports of uncontrolled pain and lack of medical treatment at [facility]. Patient reports lower back pain and RLE pain, pain managed today with current pain medication regimen. Patient Axox3, denies any side effects from current pain medication regimen. Patient R leg wrapped with an ace bandage, cd,i, Patient reports good appetite today, bm today,.        Pain is __x_ sharp ____dull ___burning _x__achy ___ Intensity: __x__ mild _x_mod ___severe     Location ____surgical site ____cervical _____lumbar ____abd ____upper ext___x_R lower ext    Worse with __x__activity __x__movement _____physical therapy___ Rest    Improved with _x___medication _x___rest ____physical therapy      morphine  - Injectable  heparin  Lock Flush 100 Units/mL Injectable  gabapentin  morphine ER Tablet  BACItracin   Ointment  morphine  - Injectable  BACItracin   Ointment  diphenhydrAMINE  morphine  - Injectable  insulin lispro Injectable (HumaLOG)  insulin glargine Injectable (LANTUS)  furosemide   Injectable  glucagon  Injectable  dextrose 50% Injectable  dextrose 50% Injectable  dextrose 50% Injectable  dextrose 40% Gel  dextrose 5%.  insulin lispro (HumaLOG) corrective regimen sliding scale  furosemide    Tablet  metoprolol succinate ER  atorvastatin  sertraline  DULoxetine  digoxin     Tablet  tamsulosin  spironolactone  prasugrel  aspirin  rifAMPin  vancomycin  IVPB  morphine ER Tablet  morphine  - Injectable  gabapentin  cyclobenzaprine  morphine  IR  senna  bisacodyl Suppository  polyethylene glycol 3350  magnesium hydroxide Suspension  ondansetron Injectable  aluminum hydroxide/magnesium hydroxide/simethicone Suspension  oxyCODONE    IR  oxyCODONE    IR  acetaminophen   Tablet ..  morphine  - Injectable      ROS: Const:  _-__febrile   Eyes:___ENT:___CV: _-__chest pain  Resp: __-__sob  GI:_-__nausea _-__vomiting _-__abd pain ___npo ___clears x__full diet __bm  :___ Musk: _x__pain _-__spasm  Skin:___ Neuro:  _-__ioddtdgk_-__taimcbvtm__-_ numbness _-__weakness _-__paresth  Psych:__anxiety  Endo:___ Heme:___Allergy:_________, _x__all others reviewed and negative      PAST MEDICAL & SURGICAL HISTORY:  Diabetic neuropathy  STEMI (ST elevation myocardial infarction)  Diverticulitis  MRSA bacteremia  History of celiac disease  CHF (congestive heart failure)  EF ~ 25%  HTN (hypertension)  Diabetes  on insulin pump  Blood clot due to device, implant, or graft  was on blood thinners  HLD (hyperlipidemia)  Osteoarthritis  Atherosclerosis of coronary artery  CAD (coronary artery disease)  Status post percutaneous transluminal coronary angioplasty  in 2012  History of open reduction and internal fixation (ORIF) procedure  Surgery, elective  Right shoulder  Surgery, elective  right knee wound debridement  S/P TKR (total knee replacement), right  with infection Mrsa   per pt he was cleared from MRSA infection  S/P CABG x 1  2018  Stented coronary artery  10/18 heart attack  INFERIOR WALL MI  Other postprocedural status  Fixation hardware in foot LEFT        10-07 @ 07:3897 mL/min/1.73M2      Hemoglobin: 8.6 g/dL (10-07 @ 07:38)        T(C): 37.1 (10-07-20 @ 09:36), Max: 37.1 (10-06-20 @ 14:10)  HR: 97 (10-07-20 @ 09:36) (77 - 97)  BP: 101/61 (10-07-20 @ 09:36) (93/53 - 106/65)  RR: 17 (10-07-20 @ 09:36) (16 - 18)  SpO2: 97% (10-07-20 @ 09:36) (93% - 98%)  Wt(kg): --     PHYSICAL EXAM:  Gen Appearance: x___no acute distress _x__appropriate        Neuro: _x__SILT feet____ EOM Intact Psych: AAOX_3_, _x__mood/affect appropriate        Eyes: _x__conjunctiva WNL  __x___ Pupils equal and round        ENT: _x__ears and nose atraumatic__x_ Hearing grossly intact        Neck: _x__trachea midline, no visible masses ___thyroid without palpable mass    Resp: _x__Nml WOB____No tactile fremitus ___clear to auscultation    Cardio: _x__extremities free from edema __x__pedal pulses palpable    GI/Abdomen: __x_soft ___x__ Nontender__x____Nondistended_____HSM    Lymphatic: ___no palpable nodes in neck  ___no palpable nodes calves and feet    Skin/Wound: ___Incision, ___Dressing c/d/i,   ____surrounding tissues soft,  ___drain/chest tube present____    Muscular: EHL _4__/5  Gastrocnemius__4_/5    ___absent clubbing/cyanosis        ASSESSMENT: As per HPI: 65yMale admitted for R knee pain, drainage s/p right knee arthroscopic I+D 7/17, right knee explant and abx spacer 7/22 with Dr. Castro;  and debridement of LLE wound with wound vac placement on 7/30 by Dr. Bowen. Was original d/c'd home on 8/10/20 with PICC and vancomycin IV q12h + rifampin.  Readmitted and s/p R hip exchange of hardware on 09/04/2020; I+D of RUQ on 09/04/2020; R Knee Revision Antibiotic Spacer by Dr. Castro, R Knee medial gastroc flap and LLE STSG by Dr. Mckeon on 9/18. Discharged to [facility] on 10/3/20 and returned to [facility] ER the same day with reports of uncontrolled pain and lack of medical treatment at [facility]. Patient reports RLE pain and lower back pain, patient reports moderate pain relief with current pain medication regimen.       Recommended Treatment PLAN:  1. Morphine 15mg PO Q4h prn severe pain  2. Morphine 2mg IVP prn before PT  3. Mscontin 30mg PO BID  4. Gabapentin 400mg PO TID  5. tylenol 975mg PO Q8  Plan discussed with Dr. Chavez

## 2020-10-07 NOTE — PROGRESS NOTE ADULT - SUBJECTIVE AND OBJECTIVE BOX
Orthopaedic Surgery Progress Note    Subjective:     Patient seen and examined. Patient comfortable without complaints, pain controlled. Frustrated about [facility] experience.  Denies chest pain, shortness of breath, nausea/vomiting, numbness/tingling.    Objective:    Vital Signs Last 24 Hrs  T(C): 37.1 (10-07-20 @ 09:36), Max: 37.1 (10-07-20 @ 09:36)  T(F): 98.7 (10-07-20 @ 09:36), Max: 98.7 (10-07-20 @ 09:36)  HR: 97 (10-07-20 @ 09:36) (77 - 97)  BP: 101/61 (10-07-20 @ 09:36) (93/53 - 101/61)  RR: 17 (10-07-20 @ 09:36) (16 - 17)  SpO2: 97% (10-07-20 @ 09:36) (95% - 97%)  AVSS    PE:  General: Patient alert and oriented, NAD  Dressing: Clean/dry/intact BLE, right knee seamus brace in place  Pulses: brisk DP pulses BLE   Sensation: SILT BLE - stable from patient's baseline  Motor: EHL/FHL 5/5 BLE                          8.6    8.43  )-----------( 480      ( 07 Oct 2020 07:38 )             30.9   07 Oct 2020 07:38    135    |  100    |  18     ----------------------------<  143    4.5     |  26     |  0.77     Ca    8.2        07 Oct 2020 07:38    TPro  6.5    /  Alb  2.3    /  TBili  0.4    /  DBili  x      /  AST  14     /  ALT  15     /  AlkPhos  178    07 Oct 2020 07:38      A/P: 65yMale   1. Pain control as needed  2. DVT prophylaxis:  3. PT, weight-bearing status:   4. Dispo:    Ortho Pager [phone] Orthopaedic Surgery Progress Note    Subjective:     Patient seen and examined. Patient comfortable without complaints, pain controlled. Frustrated about [facility] experience.  Denies chest pain, shortness of breath, nausea/vomiting, numbness/tingling.    Objective:    Vital Signs Last 24 Hrs  T(C): 37.1 (10-07-20 @ 09:36), Max: 37.1 (10-07-20 @ 09:36)  T(F): 98.7 (10-07-20 @ 09:36), Max: 98.7 (10-07-20 @ 09:36)  HR: 97 (10-07-20 @ 09:36) (77 - 97)  BP: 101/61 (10-07-20 @ 09:36) (93/53 - 101/61)  RR: 17 (10-07-20 @ 09:36) (16 - 17)  SpO2: 97% (10-07-20 @ 09:36) (95% - 97%)  AVSS    PE:  General: Patient alert and oriented, NAD  Dressing: Clean/dry/intact BLE, right knee seamus brace in place  Pulses: brisk DP pulses BLE   Sensation: SILT BLE - stable from patient's baseline  Motor: EHL/FHL 5/5 BLE                          8.6    8.43  )-----------( 480      ( 07 Oct 2020 07:38 )             30.9   07 Oct 2020 07:38    135    |  100    |  18     ----------------------------<  143    4.5     |  26     |  0.77     Ca    8.2        07 Oct 2020 07:38    TPro  6.5    /  Alb  2.3    /  TBili  0.4    /  DBili  x      /  AST  14     /  ALT  15     /  AlkPhos  178    07 Oct 2020 07:38      A/P: 64yo Male admitted for R knee pain, drainage s/p right knee arthroscopic I+D 7/17, right knee explant and abx spacer 7/22 with Dr. Castro;  and debridement of LLE wound with wound vac placement on 7/30 by Dr. Bowen. Was original d/c'd home on 8/10/20 with PICC and vancomycin IV q12h + rifampin.  Readmitted and s/p R hip exchange of hardware on 09/04/2020; bedside I+D of RUQ with gen surg on 09/04/2020; R Knee Revision Antibiotic Spacer by Dr. Castro, R Knee medial gastroc flap and LLE STSG by Dr. Mckeon on 9/18. Discharged to [facility] on 10/3/20 and returned to [facility] ER the same day with reports of uncontrolled pain and lack of medical treatment at [facility]    - Labs reviewed this AM. CBC with diff, CMP, inflammatory markers Q48H, vanc trough prior to dose on 10/9. f/u with ID Dr. Casillas outpatient; will consult with any change in patient status or lab results  - medical and cardiology co-management; appreciate recs  - Pain Control- appreciate pain management recs; 1x dose morphine 2mg IV to be given prior to PT today, then no more IV narcotics during hospital stay  - DVT ppx: ASA + effient (home regimen)  - PT, WBS: TTWB RLE in seamus brace; WBAT LLE  - dressing changes as per plastics recs (Dr. Mckeon) on 10/2 prior to previous d/c- bacitracin, adaptiq, abd pads to LLE and RLE skin graft sites; RLE to be wrapped loosely in ace wrap  - podiatry debridements from prior admission well-appearing- will reconsult as needed; follow-up outpatient  - RUQ drain site not actively draining at this time- plan to follow-up with Dr. Latif regarding elective cholecystectomy in the future  - OOB for meals as tolerated, aggressive I/S  - bowel regimen  - dispo: [facility] placement      Ortho Pager [phone]

## 2020-10-08 LAB
GLUCOSE BLDC GLUCOMTR-MCNC: 144 MG/DL — HIGH (ref 70–99)
GLUCOSE BLDC GLUCOMTR-MCNC: 158 MG/DL — HIGH (ref 70–99)
GLUCOSE BLDC GLUCOMTR-MCNC: 184 MG/DL — HIGH (ref 70–99)
GLUCOSE BLDC GLUCOMTR-MCNC: 202 MG/DL — HIGH (ref 70–99)

## 2020-10-08 PROCEDURE — 99232 SBSQ HOSP IP/OBS MODERATE 35: CPT

## 2020-10-08 RX ADMIN — POLYETHYLENE GLYCOL 3350 17 GRAM(S): 17 POWDER, FOR SOLUTION ORAL at 11:59

## 2020-10-08 RX ADMIN — MORPHINE SULFATE 15 MILLIGRAM(S): 50 CAPSULE, EXTENDED RELEASE ORAL at 22:50

## 2020-10-08 RX ADMIN — Medication 1000 MILLIGRAM(S): at 15:55

## 2020-10-08 RX ADMIN — Medication 1 APPLICATION(S): at 12:02

## 2020-10-08 RX ADMIN — DULOXETINE HYDROCHLORIDE 30 MILLIGRAM(S): 30 CAPSULE, DELAYED RELEASE ORAL at 12:02

## 2020-10-08 RX ADMIN — TAMSULOSIN HYDROCHLORIDE 0.4 MILLIGRAM(S): 0.4 CAPSULE ORAL at 22:47

## 2020-10-08 RX ADMIN — MORPHINE SULFATE 15 MILLIGRAM(S): 50 CAPSULE, EXTENDED RELEASE ORAL at 12:17

## 2020-10-08 RX ADMIN — Medication 0.12 MILLIGRAM(S): at 06:45

## 2020-10-08 RX ADMIN — GABAPENTIN 400 MILLIGRAM(S): 400 CAPSULE ORAL at 22:47

## 2020-10-08 RX ADMIN — MORPHINE SULFATE 15 MILLIGRAM(S): 50 CAPSULE, EXTENDED RELEASE ORAL at 13:28

## 2020-10-08 RX ADMIN — Medication 4 UNIT(S): at 13:24

## 2020-10-08 RX ADMIN — SPIRONOLACTONE 25 MILLIGRAM(S): 25 TABLET, FILM COATED ORAL at 06:45

## 2020-10-08 RX ADMIN — Medication 25 MILLIGRAM(S): at 06:46

## 2020-10-08 RX ADMIN — MORPHINE SULFATE 30 MILLIGRAM(S): 50 CAPSULE, EXTENDED RELEASE ORAL at 06:49

## 2020-10-08 RX ADMIN — MORPHINE SULFATE 15 MILLIGRAM(S): 50 CAPSULE, EXTENDED RELEASE ORAL at 15:55

## 2020-10-08 RX ADMIN — Medication 4 UNIT(S): at 19:06

## 2020-10-08 RX ADMIN — Medication 81 MILLIGRAM(S): at 11:58

## 2020-10-08 RX ADMIN — Medication 4 UNIT(S): at 06:49

## 2020-10-08 RX ADMIN — ATORVASTATIN CALCIUM 40 MILLIGRAM(S): 80 TABLET, FILM COATED ORAL at 22:47

## 2020-10-08 RX ADMIN — Medication 300 UNIT(S): at 12:00

## 2020-10-08 RX ADMIN — INSULIN GLARGINE 10 UNIT(S): 100 INJECTION, SOLUTION SUBCUTANEOUS at 22:16

## 2020-10-08 RX ADMIN — Medication 166.67 MILLIGRAM(S): at 22:48

## 2020-10-08 RX ADMIN — Medication 20 MILLIGRAM(S): at 06:46

## 2020-10-08 RX ADMIN — Medication 2: at 22:16

## 2020-10-08 RX ADMIN — MORPHINE SULFATE 30 MILLIGRAM(S): 50 CAPSULE, EXTENDED RELEASE ORAL at 19:13

## 2020-10-08 RX ADMIN — PRASUGREL 10 MILLIGRAM(S): 5 TABLET, FILM COATED ORAL at 11:58

## 2020-10-08 RX ADMIN — GABAPENTIN 400 MILLIGRAM(S): 400 CAPSULE ORAL at 06:45

## 2020-10-08 RX ADMIN — Medication 1: at 19:05

## 2020-10-08 RX ADMIN — Medication 300 UNIT(S): at 00:31

## 2020-10-08 RX ADMIN — SERTRALINE 25 MILLIGRAM(S): 25 TABLET, FILM COATED ORAL at 11:59

## 2020-10-08 RX ADMIN — Medication 1000 MILLIGRAM(S): at 16:24

## 2020-10-08 RX ADMIN — Medication 1 APPLICATION(S): at 12:00

## 2020-10-08 RX ADMIN — Medication 1: at 06:49

## 2020-10-08 RX ADMIN — GABAPENTIN 400 MILLIGRAM(S): 400 CAPSULE ORAL at 13:25

## 2020-10-08 NOTE — PROGRESS NOTE ADULT - SUBJECTIVE AND OBJECTIVE BOX
Ortho Note    Pt seen and examined. Pain much better controlled today. Happy about his progression with PT and premedication of his sessions with morphine. No complaints overnight.  Denies CP/N/V/SOB    Vital Signs Last 24 Hrs  T(C): 36.9 (07 Oct 2020 17:41), Max: 37.1 (07 Oct 2020 09:36)  T(F): 98.4 (07 Oct 2020 17:41), Max: 98.7 (07 Oct 2020 09:36)  HR: 80 (08 Oct 2020 00:44) (74 - 97)  BP: 136/66 (08 Oct 2020 00:44) (93/53 - 136/66)  BP(mean): --  RR: 20 (08 Oct 2020 00:44) (16 - 20)  SpO2: 96% (08 Oct 2020 00:44) (93% - 97%)    RLE:  DSG: R knee ace wrap changed, CDI; flap healing well; no drainage  Pulses: +DP;  feet cool to touch; at baseline; no edema  Sensation: Sensation intact to baseline (diminished sensation- h/o severe neuropathy; follows with vascular Dr. Malloy outpatient)  Motor: 5/5 EHL/FHL    LLE:  DSG: abd pad/ kerlix CDI to vascular wound  Pulses: feet cool to touch; at baseline; no edema  Sensation: SILT to baseline (diminished sensation- h/o severe neuropathy; follows with vascular Dr. Malloy outpatient)  Motor: 5/5 EHL/FHL/TA/GS                        A/P: 65yMale admitted for R knee pain, drainage s/p right knee arthroscopic I+D 7/17, right knee explant and abx spacer 7/22 with Dr. Castro;  and debridement of LLE wound with wound vac placement on 7/30 by Dr. Bowen. Was original d/c'd home on 8/10/20 with PICC and vancomycin IV q12h + rifampin.  Readmitted and s/p R hip exchange of hardware on 09/04/2020; I+D of RUQ on 09/04/2020; R Knee Revision Antibiotic Spacer by Dr. Castro, R Knee medial gastroc flap and LLE STSG by Dr. Mckeon on 9/18. Discharged to [facility] on 10/3/20 and returned to [facility] ER the same day with reports of uncontrolled pain and lack of medical treatment at [facility]  - AM labs EVERY OTHER DAY (no AM labs 10/8) plan to f/u with ID Dr. Casillas outpatient; will consult with any change in patient status or lab results  - medical and cardiology co-management; no current need for telemetry monitoring as per recs  - Pain Control- appreciate pain management recs; 1x dose morphine 2mg IV to be given prior to PT today  - DVT ppx: ASA + effient (home regimen)  - PT, WBS: TTWB RLE in seamus brace; WBAT LLE  - Continue pre-medication with morphine of PT sessions  - dressing changes as per plastics recs (Dr. Calvillo) on 10/2 prior to previous d/c- bacitracin, adaptiq, abd pads to LLE and RLE skin graft sites; RLE to be wrapped loosely in ace wrap  - podiatry debridements from prior admission well-appearing- will reconsult as needed; follow-up outpatient  - RUQ drain site not actively draining at this time- plan to follow-up with Dr. Latif regarding elective cholecystectomy in the future  - OOB for meals as tolerated, aggressive I/S  - bowel regimen  - dispo: [facility] placement      Ortho Pager [phone]

## 2020-10-08 NOTE — PROGRESS NOTE ADULT - SUBJECTIVE AND OBJECTIVE BOX
Pain Management Progress Note - [facility] [phone]      HPI: 65yMale admitted for R knee pain, drainage s/p right knee arthroscopic I+D 7/17, right knee explant and abx spacer 7/22 with Dr. Castro;  and debridement of LLE wound with wound vac placement on 7/30 by Dr. Bowen. Was original d/c'd home on 8/10/20 with PICC and vancomycin IV q12h + rifampin.  Readmitted and s/p R hip exchange of hardware on 09/04/2020; I+D of RUQ on 09/04/2020; R Knee Revision Antibiotic Spacer by Dr. Castro, R Knee medial gastroc flap and LLE STSG by Dr. Mckeon on 9/18. Discharged to [facility] on 10/3/20 and returned to [facility] ER the same day with reports of uncontrolled pain and lack of medical treatment at [facility]. Patient reports lower back pain and RLE pain, pain managed today with current pain medication regimen. Patient Axox3, denies any side effects from current pain medication regimen. Patient R leg wrapped with an ace bandage, cd,i, Patient reports good appetite today, bm today,.      Pain is __x_ sharp ____dull ___burning _x__achy ___ Intensity: __x__ mild _x_mod ___severe     Location ____surgical site ____cervical _____lumbar ____abd ____upper ext___x_R lower ext    Worse with __x__activity __x__movement _____physical therapy___ Rest    Improved with _x___medication _x___rest ____physical therapy      morphine ER Tablet  morphine  IR  morphine  - Injectable  heparin  Lock Flush 100 Units/mL Injectable  gabapentin  morphine ER Tablet  BACItracin   Ointment  morphine  - Injectable  BACItracin   Ointment  diphenhydrAMINE  morphine  - Injectable  insulin lispro Injectable (HumaLOG)  insulin glargine Injectable (LANTUS)  (ADM OVERRIDE)  furosemide   Injectable  glucagon  Injectable  dextrose 50% Injectable  dextrose 50% Injectable  dextrose 50% Injectable  dextrose 40% Gel  dextrose 5%.  insulin lispro (HumaLOG) corrective regimen sliding scale  furosemide    Tablet  metoprolol succinate ER  atorvastatin  sertraline  DULoxetine  digoxin     Tablet  tamsulosin  spironolactone  prasugrel  aspirin  rifAMPin  vancomycin  IVPB  morphine ER Tablet  morphine  - Injectable  gabapentin  cyclobenzaprine  morphine  IR  senna  bisacodyl Suppository  polyethylene glycol 3350  magnesium hydroxide Suspension  ondansetron Injectable  aluminum hydroxide/magnesium hydroxide/simethicone Suspension  oxyCODONE    IR  oxyCODONE    IR  acetaminophen   Tablet ..  (Floorstock)  morphine  - Injectable      ROS: Const:  _-__febrile   Eyes:___ENT:___CV: _-__chest pain  Resp: __-__sob  GI:_-__nausea _-__vomiting _-__abd pain ___npo ___clears x__full diet __bm  :___ Musk: _x__pain _-__spasm  Skin:___ Neuro:  _-__knezmicw_-__rnfvfmldi__-_ numbness _-__weakness _-__paresth  Psych:__anxiety  Endo:___ Heme:___Allergy:_________, _x__all others reviewed and negative      PAST MEDICAL & SURGICAL HISTORY:  Diabetic neuropathy  STEMI (ST elevation myocardial infarction)  Diverticulitis  MRSA bacteremia  History of celiac disease  CHF (congestive heart failure)  EF ~ 25%  HTN (hypertension)  Diabetes  on insulin pump  Blood clot due to device, implant, or graft  was on blood thinners  HLD (hyperlipidemia)  Osteoarthritis  Atherosclerosis of coronary artery  CAD (coronary artery disease)  Status post percutaneous transluminal coronary angioplasty  in 2012  History of open reduction and internal fixation (ORIF) procedure  Surgery, elective  Right shoulder  Surgery, elective  right knee wound debridement  S/P TKR (total knee replacement), right  with infection Mrsa   per pt he was cleared from MRSA infection  S/P CABG x 1  2018  Stented coronary artery  10/18 heart attack  INFERIOR WALL MI  Other postprocedural status  Fixation hardware in foot LEFT        Hemoglobin: 8.6 g/dL (10-07 @ 07:38)        T(C): 36.7 (10-08-20 @ 14:39), Max: 36.9 (10-07-20 @ 17:41)  HR: 81 (10-08-20 @ 14:39) (74 - 81)  BP: 114/69 (10-08-20 @ 07:00) (114/64 - 136/66)  RR: 18 (10-08-20 @ 14:39) (18 - 20)  SpO2: 94% (10-08-20 @ 14:39) (93% - 97%)  Wt(kg): --          PHYSICAL EXAM:  Gen Appearance: x___no acute distress _x__appropriate        Neuro: _x__SILT feet____ EOM Intact Psych: AAOX_3_, _x__mood/affect appropriate        Eyes: _x__conjunctiva WNL  __x___ Pupils equal and round        ENT: _x__ears and nose atraumatic__x_ Hearing grossly intact        Neck: _x__trachea midline, no visible masses ___thyroid without palpable mass    Resp: _x__Nml WOB____No tactile fremitus ___clear to auscultation    Cardio: _x__extremities free from edema __x__pedal pulses palpable    GI/Abdomen: __x_soft ___x__ Nontender__x____Nondistended_____HSM    Lymphatic: ___no palpable nodes in neck  ___no palpable nodes calves and feet    Skin/Wound: ___Incision, ___Dressing c/d/i,   ____surrounding tissues soft,  ___drain/chest tube present____    Muscular: EHL _4__/5  Gastrocnemius__4_/5    ___absent clubbing/cyanosis        ASSESSMENT: As per HPI: 65yMale admitted for R knee pain, drainage s/p right knee arthroscopic I+D 7/17, right knee explant and abx spacer 7/22 with Dr. Castro;  and debridement of LLE wound with wound vac placement on 7/30 by Dr. Bowen. Was original d/c'd home on 8/10/20 with PICC and vancomycin IV q12h + rifampin.  Readmitted and s/p R hip exchange of hardware on 09/04/2020; I+D of RUQ on 09/04/2020; R Knee Revision Antibiotic Spacer by Dr. Castro, R Knee medial gastroc flap and LLE STSG by Dr. Mckeon on 9/18. Discharged to [facility] on 10/3/20 and returned to [facility] ER the same day with reports of uncontrolled pain and lack of medical treatment at [facility]. Patient reports RLE pain and lower back pain, patient reports moderate pain relief with current pain medication regimen.       Recommended Treatment PLAN:  1. Morphine 15mg PO Q4h prn severe pain  2. Mscontin 30mg PO BID  3. Gabapentin 400mg PO TID  4. tylenol 975mg PO Q8  Plan discussed with Dr. Chavez               Pain Management Progress Note - [facility] [phone]      HPI: 65yMale admitted for R knee pain, drainage s/p right knee arthroscopic I+D 7/17, right knee explant and abx spacer 7/22 with Dr. Castro;  and debridement of LLE wound with wound vac placement on 7/30 by Dr. Bowen. Was original d/c'd home on 8/10/20 with PICC and vancomycin IV q12h + rifampin.  Readmitted and s/p R hip exchange of hardware on 09/04/2020; I+D of RUQ on 09/04/2020; R Knee Revision Antibiotic Spacer by Dr. Castro, R Knee medial gastroc flap and LLE STSG by Dr. Mckeon on 9/18. Discharged to [facility] on 10/3/20 and returned to [facility] ER the same day with reports of uncontrolled pain and lack of medical treatment at [facility]. Patient reports lower back pain and RLE pain, pain managed today with current pain medication regimen. Patient Axox3, denies any side effects from current pain medication regimen. Patient R leg wrapped with an ace bandage, cd,i, Patient reports good appetite today, bm today,.      Pain is __x_ sharp ____dull ___burning _x__achy ___ Intensity: __x__ mild _x_mod ___severe     Location ____surgical site ____cervical _____lumbar ____abd ____upper ext___x_R lower ext    Worse with __x__activity __x__movement _____physical therapy___ Rest    Improved with _x___medication _x___rest ____physical therapy      morphine ER Tablet  morphine  IR  morphine  - Injectable  heparin  Lock Flush 100 Units/mL Injectable  gabapentin  morphine ER Tablet  BACItracin   Ointment  morphine  - Injectable  BACItracin   Ointment  diphenhydrAMINE  morphine  - Injectable  insulin lispro Injectable (HumaLOG)  insulin glargine Injectable (LANTUS)  (ADM OVERRIDE)  furosemide   Injectable  glucagon  Injectable  dextrose 50% Injectable  dextrose 50% Injectable  dextrose 50% Injectable  dextrose 40% Gel  dextrose 5%.  insulin lispro (HumaLOG) corrective regimen sliding scale  furosemide    Tablet  metoprolol succinate ER  atorvastatin  sertraline  DULoxetine  digoxin     Tablet  tamsulosin  spironolactone  prasugrel  aspirin  rifAMPin  vancomycin  IVPB  morphine ER Tablet  morphine  - Injectable  gabapentin  cyclobenzaprine  morphine  IR  senna  bisacodyl Suppository  polyethylene glycol 3350  magnesium hydroxide Suspension  ondansetron Injectable  aluminum hydroxide/magnesium hydroxide/simethicone Suspension  oxyCODONE    IR  oxyCODONE    IR  acetaminophen   Tablet ..  (Floorstock)  morphine  - Injectable      ROS: Const:  _-__febrile   Eyes:___ENT:___CV: _-__chest pain  Resp: __-__sob  GI:_-__nausea _-__vomiting _-__abd pain ___npo ___clears x__full diet __bm  :___ Musk: _x__pain _-__spasm  Skin:___ Neuro:  _-__pyhymapc_-__kmlnmthqt__-_ numbness _-__weakness _-__paresth  Psych:__anxiety  Endo:___ Heme:___Allergy:_________, _x__all others reviewed and negative      PAST MEDICAL & SURGICAL HISTORY:  Diabetic neuropathy  STEMI (ST elevation myocardial infarction)  Diverticulitis  MRSA bacteremia  History of celiac disease  CHF (congestive heart failure)  EF ~ 25%  HTN (hypertension)  Diabetes  on insulin pump  Blood clot due to device, implant, or graft  was on blood thinners  HLD (hyperlipidemia)  Osteoarthritis  Atherosclerosis of coronary artery  CAD (coronary artery disease)  Status post percutaneous transluminal coronary angioplasty  in 2012  History of open reduction and internal fixation (ORIF) procedure  Surgery, elective  Right shoulder  Surgery, elective  right knee wound debridement  S/P TKR (total knee replacement), right  with infection Mrsa   per pt he was cleared from MRSA infection  S/P CABG x 1  2018  Stented coronary artery  10/18 heart attack  INFERIOR WALL MI  Other postprocedural status  Fixation hardware in foot LEFT        Hemoglobin: 8.6 g/dL (10-07 @ 07:38)        T(C): 36.7 (10-08-20 @ 14:39), Max: 36.9 (10-07-20 @ 17:41)  HR: 81 (10-08-20 @ 14:39) (74 - 81)  BP: 114/69 (10-08-20 @ 07:00) (114/64 - 136/66)  RR: 18 (10-08-20 @ 14:39) (18 - 20)  SpO2: 94% (10-08-20 @ 14:39) (93% - 97%)  Wt(kg): --          PHYSICAL EXAM:  Gen Appearance: x___no acute distress _x__appropriate        Neuro: _x__SILT feet____ EOM Intact Psych: AAOX_3_, _x__mood/affect appropriate        Eyes: _x__conjunctiva WNL  __x___ Pupils equal and round        ENT: _x__ears and nose atraumatic__x_ Hearing grossly intact        Neck: _x__trachea midline, no visible masses ___thyroid without palpable mass    Resp: _x__Nml WOB____No tactile fremitus ___clear to auscultation    Cardio: _x__extremities free from edema __x__pedal pulses palpable    GI/Abdomen: __x_soft ___x__ Nontender__x____Nondistended_____HSM    Lymphatic: ___no palpable nodes in neck  ___no palpable nodes calves and feet    Skin/Wound: ___Incision, ___Dressing c/d/i,   ____surrounding tissues soft,  ___drain/chest tube present____    Muscular: EHL _4__/5  Gastrocnemius__4_/5    ___absent clubbing/cyanosis        ASSESSMENT: As per HPI: 65yMale admitted for R knee pain, drainage s/p right knee arthroscopic I+D 7/17, right knee explant and abx spacer 7/22 with Dr. Castro;  and debridement of LLE wound with wound vac placement on 7/30 by Dr. Bowen. Was original d/c'd home on 8/10/20 with PICC and vancomycin IV q12h + rifampin.  Readmitted and s/p R hip exchange of hardware on 09/04/2020; I+D of RUQ on 09/04/2020; R Knee Revision Antibiotic Spacer by Dr. Castro, R Knee medial gastroc flap and LLE STSG by Dr. Mckeon on 9/18. Discharged to [facility] on 10/3/20 and returned to [facility] ER the same day with reports of uncontrolled pain and lack of medical treatment at [facility]. Patient reports RLE pain and lower back pain, patient reports moderate pain relief with current pain medication regimen.       Recommended Treatment PLAN:  1. Morphine 15mg PO Q4h prn severe pain  2. Mscontin 30mg PO BID  3. Gabapentin 400mg PO TID  4. tylenol 975mg PO Q8  Plan discussed with Dr. Chavez

## 2020-10-08 NOTE — PROGRESS NOTE ADULT - SUBJECTIVE AND OBJECTIVE BOX
INTERVAL HISTORY:  Dyspnea is improved	  Chart, [facility] medications and allergies reviewed    MEDICATIONS:  MEDICATIONS  (STANDING):  aspirin 81 milliGRAM(s) Oral daily  atorvastatin 40 milliGRAM(s) Oral at bedtime  BACItracin   Ointment 1 Application(s) Topical daily  BACItracin   Ointment 1 Application(s) Topical daily  dextrose 5%. 1000 milliLiter(s) (50 mL/Hr) IV Continuous <Continuous>  dextrose 50% Injectable 12.5 Gram(s) IV Push once  dextrose 50% Injectable 25 Gram(s) IV Push once  dextrose 50% Injectable 25 Gram(s) IV Push once  digoxin     Tablet 0.125 milliGRAM(s) Oral daily  DULoxetine 30 milliGRAM(s) Oral daily  furosemide    Tablet 20 milliGRAM(s) Oral daily  gabapentin 400 milliGRAM(s) Oral three times a day  heparin  Lock Flush 100 Units/mL Injectable 300 Unit(s) IV Push daily  insulin glargine Injectable (LANTUS) 10 Unit(s) SubCutaneous at bedtime  insulin lispro (HumaLOG) corrective regimen sliding scale   SubCutaneous Before meals and at bedtime  insulin lispro Injectable (HumaLOG) 4 Unit(s) SubCutaneous three times a day before meals  metoprolol succinate ER 25 milliGRAM(s) Oral daily  morphine ER Tablet 30 milliGRAM(s) Oral two times a day  polyethylene glycol 3350 17 Gram(s) Oral daily  prasugrel 10 milliGRAM(s) Oral daily  rifAMPin 300 milliGRAM(s) Oral every 12 hours  sertraline 25 milliGRAM(s) Oral daily  spironolactone 25 milliGRAM(s) Oral daily  tamsulosin 0.4 milliGRAM(s) Oral at bedtime  vancomycin  IVPB 1250 milliGRAM(s) IV Intermittent every 24 hours      REVIEW OF SYSTEMS:  CONSTITUTIONAL: No fever, no weight loss, no fatigue  PULMONARY: No cough, no wheezing, chills no hemoptysis; No Shortness of Breath  CARDIOVASCULAR: No chest pain, no palpitations, no syncope, dizziness, no leg swelling  GASTROINTESTINAL: No abdominal pain. No nausea, no  vomiting, no hematemesis; No diarrhea, no constipation. No melena, no hematochezia.  GENITOURINARY: No dysuria, no frequency, no hematuria, no incontinence  SKIN: No itching, no burning, no rashes, no lesions     PHYSICAL EXAM:  T(C): 36.9 (10-08-20 @ 07:00), Max: 37.1 (10-07-20 @ 09:36)  HR: 74 (10-08-20 @ 07:00) (74 - 97)  BP: 114/69 (10-08-20 @ 07:00) (101/56 - 136/66)  RR: 19 (10-08-20 @ 07:00) (17 - 20)  SpO2: 96% (10-08-20 @ 07:00) (93% - 97%)  Wt(kg): --  I&O's Summary    07 Oct 2020 07:01  -  08 Oct 2020 07:00  --------------------------------------------------------  IN: 1050 mL / OUT: 600 mL / NET: 450 mL        Appearance:  No deformities, normal appearance	  HEENT:   Normal oral mucosa, PERRL, EOMI	  Neck: No jvd , no masses  Lymphatic: No  lymphadenopathy  Pulmonary: Lungs clear to auscultation and percussion  Cardiovascular: Normal S1 S2, No JVD, No murmur, No edema	  Abdomen:  Soft, Non-tender, + BS  Skin: No rashes, No ecchymoses	  Extremities:  No clubbing or cyanosis  MSK: Normal range of motion, no joint swelling    LABS:		  CARDIAC MARKERS:                         8.6    8.43  )-----------( 480      ( 07 Oct 2020 07:38 )             30.9   10-07    135  |  100  |  18  ----------------------------<  143<H>  4.5   |  26  |  0.77    Ca    8.2<L>      07 Oct 2020 07:38    TPro  6.5  /  Alb  2.3<L>  /  TBili  0.4  /  DBili  x   /  AST  14  /  ALT  15  /  AlkPhos  178<H>  10-07    proBNP:  Lipid Profile:  HgA1c:  TSH:      TELEMETRY: 	      QTC     ECG:  	   QTC         RADIOLOGY:   DIAGNOSTIC TESTING: [ ] Echocardiogram: [ ]  Catheterization: [ ] Stress Test:      ASSESSMENT/PLAN: 	  Severely reduced ejection fraction-Dyspnea is improved after furosemide.   Mild edema. Continue metoprolol digoxin furosemide and spironolactone.    Coronary artery disease-the patient denies chest discomfort.  Continue prasugrel and aspirin.     Ventricular tachycardia/Defibrillator- asymptomatic. No VT on monitor. ICD recently checked. No RX needed. Off monitor.     Hyponatremia-normal sodium.    Septic knee- as per ID and orthopedics.

## 2020-10-08 NOTE — CHART NOTE - NSCHARTNOTEFT_GEN_A_CORE
Admitting Diagnosis:   Patient is a 65y old  Male who presents with a chief complaint of Knee pain (08 Oct 2020 09:17)    Consult: Yes [   ]  No [ x  ]    Reason for Initial Nutrition Assessment: LOS Nutrition Assessment     PAST MEDICAL & SURGICAL HISTORY:  Diabetic neuropathy  STEMI (ST elevation myocardial infarction)  Diverticulitis  MRSA bacteremia  History of celiac disease  CHF (congestive heart failure)  EF ~ 25%  HTN (hypertension)  Diabetes  on insulin pump  Blood clot due to device, implant, or graft  was on blood thinners  HLD (hyperlipidemia)  Osteoarthritis  Atherosclerosis of coronary artery  CAD (coronary artery disease)  Status post percutaneous transluminal coronary angioplasty  in 2012  History of open reduction and internal fixation (ORIF) procedure  Surgery, elective  Right shoulder  Surgery, elective  right knee wound debridement  S/P TKR (total knee replacement), right  with infection Mrsa   per pt he was cleared from MRSA infection  S/P CABG x 1  2018  Stented coronary artery  10/18 heart attack  INFERIOR WALL MI  Other postprocedural status  Fixation hardware in foot LEFT    Current Nutrition Order: CST CHO diet with no snacks    PO Intake: Good (%) [   ]  Fair (50-75%) [ x  ] Poor (<25%) [   ]    GI Issues:   WDL, last BM 10/7  No n/v/d/c noted  No abd distention noted    Pain:  9/10 right knee pain noted- currently on pain regime    Skin Integrity:  Surgical incision, arelis score 19  No edema present  No pressure ulcers noted    Labs:   10-07    135  |  100  |  18  ----------------------------<  143<H>  4.5   |  26  |  0.77    Ca    8.2<L>      07 Oct 2020 07:38    TPro  6.5  /  Alb  2.3<L>  /  TBili  0.4  /  DBili  x   /  AST  14  /  ALT  15  /  AlkPhos  178<H>  10-07    CAPILLARY BLOOD GLUCOSE    POCT Blood Glucose.: 144 mg/dL (08 Oct 2020 13:18)  POCT Blood Glucose.: 158 mg/dL (08 Oct 2020 06:35)  POCT Blood Glucose.: 176 mg/dL (07 Oct 2020 21:59)  POCT Blood Glucose.: 150 mg/dL (07 Oct 2020 17:29)    Medications:  MEDICATIONS  (STANDING):  aspirin 81 milliGRAM(s) Oral daily  atorvastatin 40 milliGRAM(s) Oral at bedtime  BACItracin   Ointment 1 Application(s) Topical daily  BACItracin   Ointment 1 Application(s) Topical daily  dextrose 5%. 1000 milliLiter(s) (50 mL/Hr) IV Continuous <Continuous>  dextrose 50% Injectable 12.5 Gram(s) IV Push once  dextrose 50% Injectable 25 Gram(s) IV Push once  dextrose 50% Injectable 25 Gram(s) IV Push once  digoxin     Tablet 0.125 milliGRAM(s) Oral daily  DULoxetine 30 milliGRAM(s) Oral daily  furosemide    Tablet 20 milliGRAM(s) Oral daily  gabapentin 400 milliGRAM(s) Oral three times a day  heparin  Lock Flush 100 Units/mL Injectable 300 Unit(s) IV Push daily  insulin glargine Injectable (LANTUS) 10 Unit(s) SubCutaneous at bedtime  insulin lispro (HumaLOG) corrective regimen sliding scale   SubCutaneous Before meals and at bedtime  insulin lispro Injectable (HumaLOG) 4 Unit(s) SubCutaneous three times a day before meals  metoprolol succinate ER 25 milliGRAM(s) Oral daily  morphine ER Tablet 30 milliGRAM(s) Oral two times a day  polyethylene glycol 3350 17 Gram(s) Oral daily  prasugrel 10 milliGRAM(s) Oral daily  rifAMPin 300 milliGRAM(s) Oral every 12 hours  sertraline 25 milliGRAM(s) Oral daily  spironolactone 25 milliGRAM(s) Oral daily  tamsulosin 0.4 milliGRAM(s) Oral at bedtime  vancomycin  IVPB 1250 milliGRAM(s) IV Intermittent every 24 hours    MEDICATIONS  (PRN):  acetaminophen   Tablet .. 1000 milliGRAM(s) Oral every 8 hours PRN Temp greater or equal to 38C (100.4F), Mild Pain (1 - 3)  aluminum hydroxide/magnesium hydroxide/simethicone Suspension 30 milliLiter(s) Oral four times a day PRN Indigestion  bisacodyl Suppository 10 milliGRAM(s) Rectal daily PRN If no bowel movement by postoperative day #2  cyclobenzaprine 5 milliGRAM(s) Oral three times a day PRN Muscle Spasm  dextrose 40% Gel 15 Gram(s) Oral once PRN Blood Glucose LESS THAN 70 milliGRAM(s)/deciliter  glucagon  Injectable 1 milliGRAM(s) IntraMuscular once PRN Glucose LESS THAN 70 milligrams/deciliter  magnesium hydroxide Suspension 30 milliLiter(s) Oral daily PRN Constipation  morphine  IR 15 milliGRAM(s) Oral every 4 hours PRN Severe Pain (7 - 10)  ondansetron Injectable 4 milliGRAM(s) IV Push every 6 hours PRN Nausea and/or Vomiting  senna 2 Tablet(s) Oral at bedtime PRN Constipation    Admitted Anthropometrics:  Height: 73" Weight: 231lbs, IBW 184lbs+/-10%, %%, BMI 30.5 kg/m2    Weight:  July 2020 183lbs (previous admission)  9/1 185lbs (previous admit)  9/15 210lbs (previous admit)  9/18 210lbs (previous admit)  10/3 231lbs    Weight Change: Pt with steady weight gain over the last few months +46lbs/ 19% over 3-4 months. Recommend reweigh to confirm weight changes. Cont to trend weight biweekly.     Nutrition Focused Physical Exam: Completed [   ]  Unable to complete [   ]- N/A    Estimated energy needs:   IBW (84kg) used for calculations as pt >120% of IBW. Needs adjusted for wound healing.   Kcal (25-30 kcal/kg): 7813-9381 kcal  Protein (1.1-1.3 g/kg pro):  g pro  Fluids (25-30 ml/kg): 7204-0724 ml    Subjective:   65yMale admitted for R knee pain, drainage s/p right knee arthroscopic I+D 7/17, right knee explant and abx spacer 7/22 with Dr. Castro;  and debridement of LLE wound with wound vac placement on 7/30 by Dr. Bowen. Was original d/c'd home on 8/10/20 with PICC and vancomycin IV q12h + rifampin.  Readmitted and s/p R hip exchange of hardware on 09/04/2020; I+D of RUQ on 09/04/2020; R Knee Revision Antibiotic Spacer by Dr. Castro, R Knee medial gastroc flap and LLE STSG by Dr. Mckeon on 9/18. Discharged to [facility] on 10/3/20 and returned to [facility] ER the same day with reports of uncontrolled pain and lack of medical treatment at [facility]. Pending [facility] placement at this time per disc with team during IDRs. On assessment, pt resting in bed without complaints. Currently on CST CHO diet with no snacks. Pt consuming >50% of meals with no complaints. Denies n/v/d/c. No abd distention. Pt well educated on CST CHO diet and wound healing recommendations- pt focusing on implementing high protein foods with meals. Per new weight obtained this admit, pt with a noted +46lbs/ 19% over 3-4 months- please obtain new weight when feasible to confirm changes. Pt noting good PO intake. NKFA. Please see nutrition recs below. RD to follow.     Nutrition Diagnosis: Increased pro needs RT wound healing AEB prolonged right knee wound and infection    [  ] No active nutrition diagnosis at this time  [  ] Current medical condition precludes nutrition intervention    Goal: Consistently meet >75% est needs    Recommendations:  1. CST CHO diet with no snacks  2. Pain and bowel regime per team  3. Monitor lytes and replete prn  4. Please obtain new weight to confirm weight changes  5. RD diet education reenforcement prn    Education: Pt well educated on CST CHO diet and wound healing recommendations- pt focusing on implementing high protein foods with meals. Full education declined.     Risk Level: High [   ] Moderate [ x  ] Low [   ] Admitting Diagnosis:   Patient is a 65y old  Male who presents with a chief complaint of Knee pain (08 Oct 2020 09:17)    Consult: Yes [   ]  No [ x  ]    Reason for Initial Nutrition Assessment: LOS Nutrition Assessment     PAST MEDICAL & SURGICAL HISTORY:  Diabetic neuropathy  STEMI (ST elevation myocardial infarction)  Diverticulitis  MRSA bacteremia  History of celiac disease  CHF (congestive heart failure)  EF ~ 25%  HTN (hypertension)  Diabetes  on insulin pump  Blood clot due to device, implant, or graft  was on blood thinners  HLD (hyperlipidemia)  Osteoarthritis  Atherosclerosis of coronary artery  CAD (coronary artery disease)  Status post percutaneous transluminal coronary angioplasty  in 2012  History of open reduction and internal fixation (ORIF) procedure  Surgery, elective  Right shoulder  Surgery, elective  right knee wound debridement  S/P TKR (total knee replacement), right  with infection Mrsa   per pt he was cleared from MRSA infection  S/P CABG x 1  2018  Stented coronary artery  10/18 heart attack  INFERIOR WALL MI  Other postprocedural status  Fixation hardware in foot LEFT    Current Nutrition Order: CST CHO diet with no snacks    PO Intake: Good (%) [   ]  Fair (50-75%) [ x  ] Poor (<25%) [   ]    GI Issues:   WDL, last BM 10/7  No n/v/d/c noted  No abd distention noted    Pain:  9/10 right knee pain noted- currently on pain regime    Skin Integrity:  Surgical incision, arelis score 19  No edema present  No pressure ulcers noted    Labs:   10-07    135  |  100  |  18  ----------------------------<  143<H>  4.5   |  26  |  0.77    Ca    8.2<L>      07 Oct 2020 07:38    TPro  6.5  /  Alb  2.3<L>  /  TBili  0.4  /  DBili  x   /  AST  14  /  ALT  15  /  AlkPhos  178<H>  10-07    CAPILLARY BLOOD GLUCOSE    POCT Blood Glucose.: 144 mg/dL (08 Oct 2020 13:18)  POCT Blood Glucose.: 158 mg/dL (08 Oct 2020 06:35)  POCT Blood Glucose.: 176 mg/dL (07 Oct 2020 21:59)  POCT Blood Glucose.: 150 mg/dL (07 Oct 2020 17:29)    Medications:  MEDICATIONS  (STANDING):  aspirin 81 milliGRAM(s) Oral daily  atorvastatin 40 milliGRAM(s) Oral at bedtime  BACItracin   Ointment 1 Application(s) Topical daily  BACItracin   Ointment 1 Application(s) Topical daily  dextrose 5%. 1000 milliLiter(s) (50 mL/Hr) IV Continuous <Continuous>  dextrose 50% Injectable 12.5 Gram(s) IV Push once  dextrose 50% Injectable 25 Gram(s) IV Push once  dextrose 50% Injectable 25 Gram(s) IV Push once  digoxin     Tablet 0.125 milliGRAM(s) Oral daily  DULoxetine 30 milliGRAM(s) Oral daily  furosemide    Tablet 20 milliGRAM(s) Oral daily  gabapentin 400 milliGRAM(s) Oral three times a day  heparin  Lock Flush 100 Units/mL Injectable 300 Unit(s) IV Push daily  insulin glargine Injectable (LANTUS) 10 Unit(s) SubCutaneous at bedtime  insulin lispro (HumaLOG) corrective regimen sliding scale   SubCutaneous Before meals and at bedtime  insulin lispro Injectable (HumaLOG) 4 Unit(s) SubCutaneous three times a day before meals  metoprolol succinate ER 25 milliGRAM(s) Oral daily  morphine ER Tablet 30 milliGRAM(s) Oral two times a day  polyethylene glycol 3350 17 Gram(s) Oral daily  prasugrel 10 milliGRAM(s) Oral daily  rifAMPin 300 milliGRAM(s) Oral every 12 hours  sertraline 25 milliGRAM(s) Oral daily  spironolactone 25 milliGRAM(s) Oral daily  tamsulosin 0.4 milliGRAM(s) Oral at bedtime  vancomycin  IVPB 1250 milliGRAM(s) IV Intermittent every 24 hours    MEDICATIONS  (PRN):  acetaminophen   Tablet .. 1000 milliGRAM(s) Oral every 8 hours PRN Temp greater or equal to 38C (100.4F), Mild Pain (1 - 3)  aluminum hydroxide/magnesium hydroxide/simethicone Suspension 30 milliLiter(s) Oral four times a day PRN Indigestion  bisacodyl Suppository 10 milliGRAM(s) Rectal daily PRN If no bowel movement by postoperative day #2  cyclobenzaprine 5 milliGRAM(s) Oral three times a day PRN Muscle Spasm  dextrose 40% Gel 15 Gram(s) Oral once PRN Blood Glucose LESS THAN 70 milliGRAM(s)/deciliter  glucagon  Injectable 1 milliGRAM(s) IntraMuscular once PRN Glucose LESS THAN 70 milligrams/deciliter  magnesium hydroxide Suspension 30 milliLiter(s) Oral daily PRN Constipation  morphine  IR 15 milliGRAM(s) Oral every 4 hours PRN Severe Pain (7 - 10)  ondansetron Injectable 4 milliGRAM(s) IV Push every 6 hours PRN Nausea and/or Vomiting  senna 2 Tablet(s) Oral at bedtime PRN Constipation    Admitted Anthropometrics:  Height: 73" Weight: 231lbs, IBW 184lbs+/-10%, %%, BMI 30.5 kg/m2    Weight:  July 2020 183lbs (previous admission)  9/1 185lbs (previous admit)  9/15 210lbs (previous admit)  9/18 210lbs (previous admit)  10/3 231lbs    Weight Change: Pt with steady weight gain over the last few months +46lbs/ 19% over 3-4 months. Recommend reweigh to confirm weight changes. Cont to trend weight biweekly.     Nutrition Focused Physical Exam: Completed [   ]  Unable to complete [   ]- N/A    Estimated energy needs:   IBW (84kg) used for calculations as pt >120% of IBW. Needs adjusted for wound healing.   Kcal (25-30 kcal/kg): 9203-6976 kcal  Protein (1.1-1.3 g/kg pro):  g pro  Fluids (25-30 ml/kg): 3442-5128 ml    Subjective:   65yMale admitted for R knee pain, drainage s/p right knee arthroscopic I+D 7/17, right knee explant and abx spacer 7/22 with Dr. Castro;  and debridement of LLE wound with wound vac placement on 7/30 by Dr. Bowen. Was original d/c'd home on 8/10/20 with PICC and vancomycin IV q12h + rifampin.  Readmitted and s/p R hip exchange of hardware on 09/04/2020; I+D of RUQ on 09/04/2020; R Knee Revision Antibiotic Spacer by Dr. Castro, R Knee medial gastroc flap and LLE STSG by Dr. Mckeon on 9/18. Discharged to [facility] on 10/3/20 and returned to [facility] ER the same day with reports of uncontrolled pain and lack of medical treatment at [facility]. Pending [facility] placement at this time per disc with team during IDRs. On assessment, pt resting in bed without complaints. Currently on CST CHO diet with no snacks. Pt consuming >50% of meals with no complaints. Denies n/v/d/c. No abd distention. Pt well educated on CST CHO diet and wound healing recommendations- pt focusing on implementing high protein foods with meals. Per new weight obtained this admit, pt with a noted +46lbs/ 19% over 3-4 months- please obtain new weight when feasible to confirm changes. Pt noting good PO intake. NKFA. Please see nutrition recs below. RD to follow.     Nutrition Diagnosis: Increased pro needs RT wound healing AEB prolonged right knee wound and infection    [  ] No active nutrition diagnosis at this time  [  ] Current medical condition precludes nutrition intervention    Goal: Consistently meet >75% est needs    Recommendations:  1. CST CHO diet with no snacks  >>Recommend addition of Glucerna Shakes BID (440 kcal, 20g protein, 400mL H2O)   2. Pain and bowel regime per team  3. Monitor lytes and replete prn  4. Please obtain new weight to confirm weight changes  5. RD diet education reenforcement prn    Education: Pt well educated on CST CHO diet and wound healing recommendations- pt focusing on implementing high protein foods with meals. Full education declined.     Risk Level: High [   ] Moderate [ x  ] Low [   ]

## 2020-10-08 NOTE — PROGRESS NOTE ADULT - SUBJECTIVE AND OBJECTIVE BOX
Ortho Note    Pt comfortable without complaints, pain controlled.  Denies CP, SOB, N/V, numbness/tingling down le b/l.     Vital Signs Last 24 Hrs  T(C): 36.9 (10-08-20 @ 07:00), Max: 36.9 (10-08-20 @ 07:00)  T(F): 98.4 (10-08-20 @ 07:00), Max: 98.4 (10-08-20 @ 07:00)  HR: 74 (10-08-20 @ 07:00) (74 - 74)  BP: 114/69 (10-08-20 @ 07:00) (114/69 - 114/69)  BP(mean): --  RR: 19 (10-08-20 @ 07:00) (19 - 19)  SpO2: 96% (10-08-20 @ 07:00) (96% - 96%)      General: Pt Alert and oriented, NAD  DSG ace wrap and abd on graft sites C/D/I  Pulses:  DPs palpable b/l le   Sensation: SILT throughout distal le consistent with baseline  Motor: EHL/FHL/TA/GS 5/5 b/l                           8.6    8.43  )-----------( 480      ( 07 Oct 2020 07:38 )             30.9   07 Oct 2020 07:38    135    |  100    |  18     ----------------------------<  143    4.5     |  26     |  0.77       TPro  6.5    /  Alb  2.3    /  TBili  0.4    /  DBili  x      /  AST  14     /  ALT  15     /  AlkPhos  178    07 Oct 2020 07:38      A/P: 65yMale admitted for R knee pain, drainage s/p right knee arthroscopic I+D 7/17, right knee explant and abx spacer 7/22 with Dr. Castro;  and debridement of LLE wound with wound vac placement on 7/30 by Dr. Bowen. Was original d/c'd home on 8/10/20 with PICC and vancomycin IV q12h + rifampin.  Readmitted and s/p R hip exchange of hardware on 09/04/2020; I+D of RUQ on 09/04/2020; R Knee Revision Antibiotic Spacer by Dr. Oh, R Knee medial gastroc flap and LLE STSG by Dr. Mckeon on 9/18. Discharged to [facility] on 10/3/20 and returned to [facility] ER the same day with reports of uncontrolled pain and lack of medical treatment at [facility]  - Labs reviewed this AM. CBC with diff, CMP, inflammatory markers Q48H, vanc trough prior to dose on 10/9. f/u with ID Dr. Casillas outpatient; will consult with any change in patient status or lab results  - medical and cardiology co-management; appreciate recs  - Pain Control- appreciate pain management recs will plan to limit IV narcotics during hospital stay  - DVT ppx: ASA + effient (home regimen)  - PT, WBS: TTWB RLE in seamus brace; WBAT LLE  - dressing changes as per plastics recs (Dr. Mckeon) on 10/2 prior to previous d/c- bacitracin, adaptiq, abd pads to LLE and RLE skin graft sites; RLE to be wrapped loosely in ace wrap  - podiatry debridements from prior admission well-appearing- will reconsult as needed; follow-up outpatient  - RUQ drain site not actively draining at this time- plan to follow-up with Dr. Latif regarding elective cholecystectomy in the future  - OOB for meals as tolerated, aggressive I/S  - bowel regimen  - dispo: [facility] placement    Ortho Pager [phone]

## 2020-10-09 DIAGNOSIS — B35.3 TINEA PEDIS: ICD-10-CM

## 2020-10-09 DIAGNOSIS — F43.23 ADJUSTMENT DISORDER WITH MIXED ANXIETY AND DEPRESSED MOOD: ICD-10-CM

## 2020-10-09 DIAGNOSIS — T84.53XA INFECTION AND INFLAMMATORY REACTION DUE TO INTERNAL RIGHT KNEE PROSTHESIS, INITIAL ENCOUNTER: ICD-10-CM

## 2020-10-09 DIAGNOSIS — D62 ACUTE POSTHEMORRHAGIC ANEMIA: ICD-10-CM

## 2020-10-09 DIAGNOSIS — B96.5 PSEUDOMONAS (AERUGINOSA) (MALLEI) (PSEUDOMALLEI) AS THE CAUSE OF DISEASES CLASSIFIED ELSEWHERE: ICD-10-CM

## 2020-10-09 DIAGNOSIS — I50.22 CHRONIC SYSTOLIC (CONGESTIVE) HEART FAILURE: ICD-10-CM

## 2020-10-09 DIAGNOSIS — B35.1 TINEA UNGUIUM: ICD-10-CM

## 2020-10-09 DIAGNOSIS — E10.40 TYPE 1 DIABETES MELLITUS WITH DIABETIC NEUROPATHY, UNSPECIFIED: ICD-10-CM
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
## 2020-10-09 DIAGNOSIS — M25.561 PAIN IN RIGHT KNEE: ICD-10-CM

## 2020-10-09 DIAGNOSIS — L97.519 NON-PRESSURE CHRONIC ULCER OF OTHER PART OF RIGHT FOOT WITH UNSPECIFIED SEVERITY: ICD-10-CM

## 2020-10-09 DIAGNOSIS — E10.42 TYPE 1 DIABETES MELLITUS WITH DIABETIC POLYNEUROPATHY: ICD-10-CM

## 2020-10-09 DIAGNOSIS — I25.2 OLD MYOCARDIAL INFARCTION: ICD-10-CM

## 2020-10-09 DIAGNOSIS — Z95.5 PRESENCE OF CORONARY ANGIOPLASTY IMPLANT AND GRAFT: ICD-10-CM

## 2020-10-09 DIAGNOSIS — E87.1 HYPO-OSMOLALITY AND HYPONATREMIA: ICD-10-CM

## 2020-10-09 DIAGNOSIS — I25.10 ATHEROSCLEROTIC HEART DISEASE OF NATIVE CORONARY ARTERY WITHOUT ANGINA PECTORIS: ICD-10-CM

## 2020-10-09 DIAGNOSIS — E10.621 TYPE 1 DIABETES MELLITUS WITH FOOT ULCER: ICD-10-CM

## 2020-10-09 DIAGNOSIS — L02.612 CUTANEOUS ABSCESS OF LEFT FOOT: ICD-10-CM

## 2020-10-09 DIAGNOSIS — I11.0 HYPERTENSIVE HEART DISEASE WITH HEART FAILURE: ICD-10-CM

## 2020-10-09 DIAGNOSIS — E78.5 HYPERLIPIDEMIA, UNSPECIFIED: ICD-10-CM

## 2020-10-09 DIAGNOSIS — I27.20 PULMONARY HYPERTENSION, UNSPECIFIED: ICD-10-CM

## 2020-10-09 DIAGNOSIS — Z89.422 ACQUIRED ABSENCE OF OTHER LEFT TOE(S): ICD-10-CM

## 2020-10-09 DIAGNOSIS — Z95.1 PRESENCE OF AORTOCORONARY BYPASS GRAFT: ICD-10-CM

## 2020-10-09 DIAGNOSIS — A41.9 SEPSIS, UNSPECIFIED ORGANISM: ICD-10-CM

## 2020-10-09 DIAGNOSIS — Z79.82 LONG TERM (CURRENT) USE OF ASPIRIN: ICD-10-CM

## 2020-10-09 DIAGNOSIS — Z16.21 RESISTANCE TO VANCOMYCIN: ICD-10-CM

## 2020-10-09 DIAGNOSIS — E10.65 TYPE 1 DIABETES MELLITUS WITH HYPERGLYCEMIA: ICD-10-CM

## 2020-10-09 DIAGNOSIS — Z95.810 PRESENCE OF AUTOMATIC (IMPLANTABLE) CARDIAC DEFIBRILLATOR: ICD-10-CM

## 2020-10-09 DIAGNOSIS — L02.211 CUTANEOUS ABSCESS OF ABDOMINAL WALL: ICD-10-CM

## 2020-10-09 DIAGNOSIS — Z96.41 PRESENCE OF INSULIN PUMP (EXTERNAL) (INTERNAL): ICD-10-CM
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
## 2020-10-09 DIAGNOSIS — B95.2 ENTEROCOCCUS AS THE CAUSE OF DISEASES CLASSIFIED ELSEWHERE: ICD-10-CM

## 2020-10-09 DIAGNOSIS — M00.9 PYOGENIC ARTHRITIS, UNSPECIFIED: ICD-10-CM

## 2020-10-09 LAB
ALBUMIN SERPL ELPH-MCNC: 2.2 G/DL — LOW (ref 3.3–5)
ALP SERPL-CCNC: 168 U/L — HIGH (ref 40–120)
ALT FLD-CCNC: 12 U/L — SIGNIFICANT CHANGE UP (ref 10–45)
ANION GAP SERPL CALC-SCNC: 9 MMOL/L — SIGNIFICANT CHANGE UP (ref 5–17)
AST SERPL-CCNC: 20 U/L — SIGNIFICANT CHANGE UP (ref 10–40)
BASOPHILS # BLD AUTO: 0.09 K/UL — SIGNIFICANT CHANGE UP (ref 0–0.2)
BASOPHILS NFR BLD AUTO: 1.2 % — SIGNIFICANT CHANGE UP (ref 0–2)
BILIRUB SERPL-MCNC: 0.4 MG/DL — SIGNIFICANT CHANGE UP (ref 0.2–1.2)
BUN SERPL-MCNC: 18 MG/DL — SIGNIFICANT CHANGE UP (ref 7–23)
CALCIUM SERPL-MCNC: 8.2 MG/DL — LOW (ref 8.4–10.5)
CHLORIDE SERPL-SCNC: 96 MMOL/L — SIGNIFICANT CHANGE UP (ref 96–108)
CO2 SERPL-SCNC: 27 MMOL/L — SIGNIFICANT CHANGE UP (ref 22–31)
CREAT SERPL-MCNC: 0.87 MG/DL — SIGNIFICANT CHANGE UP (ref 0.5–1.3)
CRP SERPL-MCNC: 5.47 MG/DL — HIGH (ref 0–0.4)
EOSINOPHIL # BLD AUTO: 0.27 K/UL — SIGNIFICANT CHANGE UP (ref 0–0.5)
EOSINOPHIL NFR BLD AUTO: 3.6 % — SIGNIFICANT CHANGE UP (ref 0–6)
ERYTHROCYTE [SEDIMENTATION RATE] IN BLOOD: 45 MM/HR — HIGH
GLUCOSE BLDC GLUCOMTR-MCNC: 104 MG/DL — HIGH (ref 70–99)
GLUCOSE BLDC GLUCOMTR-MCNC: 129 MG/DL — HIGH (ref 70–99)
GLUCOSE BLDC GLUCOMTR-MCNC: 146 MG/DL — HIGH (ref 70–99)
GLUCOSE BLDC GLUCOMTR-MCNC: 148 MG/DL — HIGH (ref 70–99)
GLUCOSE BLDC GLUCOMTR-MCNC: 179 MG/DL — HIGH (ref 70–99)
GLUCOSE SERPL-MCNC: 132 MG/DL — HIGH (ref 70–99)
HCT VFR BLD CALC: 29.7 % — LOW (ref 39–50)
HGB BLD-MCNC: 8.4 G/DL — LOW (ref 13–17)
IMM GRANULOCYTES NFR BLD AUTO: 0.4 % — SIGNIFICANT CHANGE UP (ref 0–1.5)
LYMPHOCYTES # BLD AUTO: 0.8 K/UL — LOW (ref 1–3.3)
LYMPHOCYTES # BLD AUTO: 10.5 % — LOW (ref 13–44)
MCHC RBC-ENTMCNC: 21.2 PG — LOW (ref 27–34)
MCHC RBC-ENTMCNC: 28.3 GM/DL — LOW (ref 32–36)
MCV RBC AUTO: 75 FL — LOW (ref 80–100)
MONOCYTES # BLD AUTO: 1.02 K/UL — HIGH (ref 0–0.9)
MONOCYTES NFR BLD AUTO: 13.4 % — SIGNIFICANT CHANGE UP (ref 2–14)
NEUTROPHILS # BLD AUTO: 5.38 K/UL — SIGNIFICANT CHANGE UP (ref 1.8–7.4)
NEUTROPHILS NFR BLD AUTO: 70.9 % — SIGNIFICANT CHANGE UP (ref 43–77)
NRBC # BLD: 0 /100 WBCS — SIGNIFICANT CHANGE UP (ref 0–0)
PLATELET # BLD AUTO: 444 K/UL — HIGH (ref 150–400)
POTASSIUM SERPL-MCNC: 4.8 MMOL/L — SIGNIFICANT CHANGE UP (ref 3.5–5.3)
POTASSIUM SERPL-SCNC: 4.8 MMOL/L — SIGNIFICANT CHANGE UP (ref 3.5–5.3)
PROT SERPL-MCNC: 6.2 G/DL — SIGNIFICANT CHANGE UP (ref 6–8.3)
RBC # BLD: 3.96 M/UL — LOW (ref 4.2–5.8)
RBC # FLD: 22.7 % — HIGH (ref 10.3–14.5)
SODIUM SERPL-SCNC: 132 MMOL/L — LOW (ref 135–145)
VANCOMYCIN TROUGH SERPL-MCNC: 18 UG/ML — SIGNIFICANT CHANGE UP (ref 10–20)
WBC # BLD: 7.59 K/UL — SIGNIFICANT CHANGE UP (ref 3.8–10.5)
WBC # FLD AUTO: 7.59 K/UL — SIGNIFICANT CHANGE UP (ref 3.8–10.5)

## 2020-10-09 PROCEDURE — 99232 SBSQ HOSP IP/OBS MODERATE 35: CPT | Mod: GC

## 2020-10-09 PROCEDURE — 99232 SBSQ HOSP IP/OBS MODERATE 35: CPT

## 2020-10-09 RX ORDER — MORPHINE SULFATE 50 MG/1
30 CAPSULE, EXTENDED RELEASE ORAL THREE TIMES A DAY
Refills: 0 | Status: DISCONTINUED | OUTPATIENT
Start: 2020-10-09 | End: 2020-10-13

## 2020-10-09 RX ADMIN — MORPHINE SULFATE 30 MILLIGRAM(S): 50 CAPSULE, EXTENDED RELEASE ORAL at 05:58

## 2020-10-09 RX ADMIN — Medication 4 UNIT(S): at 09:12

## 2020-10-09 RX ADMIN — Medication 20 MILLIGRAM(S): at 05:59

## 2020-10-09 RX ADMIN — PRASUGREL 10 MILLIGRAM(S): 5 TABLET, FILM COATED ORAL at 12:27

## 2020-10-09 RX ADMIN — GABAPENTIN 400 MILLIGRAM(S): 400 CAPSULE ORAL at 05:55

## 2020-10-09 RX ADMIN — Medication 1 APPLICATION(S): at 23:14

## 2020-10-09 RX ADMIN — Medication 1000 MILLIGRAM(S): at 14:53

## 2020-10-09 RX ADMIN — Medication 4 UNIT(S): at 18:30

## 2020-10-09 RX ADMIN — GABAPENTIN 400 MILLIGRAM(S): 400 CAPSULE ORAL at 23:16

## 2020-10-09 RX ADMIN — Medication 166.67 MILLIGRAM(S): at 23:11

## 2020-10-09 RX ADMIN — INSULIN GLARGINE 10 UNIT(S): 100 INJECTION, SOLUTION SUBCUTANEOUS at 23:15

## 2020-10-09 RX ADMIN — Medication 81 MILLIGRAM(S): at 12:27

## 2020-10-09 RX ADMIN — ATORVASTATIN CALCIUM 40 MILLIGRAM(S): 80 TABLET, FILM COATED ORAL at 23:16

## 2020-10-09 RX ADMIN — Medication 4 UNIT(S): at 12:26

## 2020-10-09 RX ADMIN — MORPHINE SULFATE 30 MILLIGRAM(S): 50 CAPSULE, EXTENDED RELEASE ORAL at 16:10

## 2020-10-09 RX ADMIN — MORPHINE SULFATE 30 MILLIGRAM(S): 50 CAPSULE, EXTENDED RELEASE ORAL at 17:30

## 2020-10-09 RX ADMIN — Medication 25 MILLIGRAM(S): at 05:58

## 2020-10-09 RX ADMIN — Medication 0.12 MILLIGRAM(S): at 05:58

## 2020-10-09 RX ADMIN — Medication 300 UNIT(S): at 12:28

## 2020-10-09 RX ADMIN — Medication 1000 MILLIGRAM(S): at 15:00

## 2020-10-09 RX ADMIN — MORPHINE SULFATE 15 MILLIGRAM(S): 50 CAPSULE, EXTENDED RELEASE ORAL at 12:36

## 2020-10-09 RX ADMIN — DULOXETINE HYDROCHLORIDE 30 MILLIGRAM(S): 30 CAPSULE, DELAYED RELEASE ORAL at 12:27

## 2020-10-09 RX ADMIN — Medication 1 APPLICATION(S): at 23:15

## 2020-10-09 RX ADMIN — SPIRONOLACTONE 25 MILLIGRAM(S): 25 TABLET, FILM COATED ORAL at 05:55

## 2020-10-09 RX ADMIN — Medication 1: at 18:30

## 2020-10-09 RX ADMIN — GABAPENTIN 400 MILLIGRAM(S): 400 CAPSULE ORAL at 14:55

## 2020-10-09 RX ADMIN — SERTRALINE 25 MILLIGRAM(S): 25 TABLET, FILM COATED ORAL at 12:27

## 2020-10-09 RX ADMIN — CYCLOBENZAPRINE HYDROCHLORIDE 5 MILLIGRAM(S): 10 TABLET, FILM COATED ORAL at 16:08

## 2020-10-09 RX ADMIN — MORPHINE SULFATE 15 MILLIGRAM(S): 50 CAPSULE, EXTENDED RELEASE ORAL at 13:30

## 2020-10-09 NOTE — PROGRESS NOTE ADULT - SUBJECTIVE AND OBJECTIVE BOX
Ortho Note    Pt comfortable without complaints, pain controlled  Denies CP, SOB, N/V, numbness/tingling     Vital Signs Last 24 Hrs  T(C): 36.7 (10-09-20 @ 08:33), Max: 36.7 (10-09-20 @ 08:33)  T(F): 98.1 (10-09-20 @ 08:33), Max: 98.1 (10-09-20 @ 08:33)  HR: 84 (10-09-20 @ 08:33) (84 - 84)  BP: 101/62 (10-09-20 @ 08:33) (101/62 - 101/62)  BP(mean): --  RR: 16 (10-09-20 @ 08:33) (16 - 16)  SpO2: 97% (10-09-20 @ 08:33) (97% - 97%)      General: Pt Alert and oriented, NAD  DSG C/D/I  Pulses:  Sensation:  Motor: EHL/FHL/TA/GS                          8.4    7.59  )-----------( 444      ( 09 Oct 2020 05:56 )             29.7   09 Oct 2020 05:55    132    |  96     |  18     ----------------------------<  132    4.8     |  27     |  0.87     Ca    8.2        09 Oct 2020 05:55    TPro  6.2    /  Alb  2.2    /  TBili  0.4    /  DBili  x      /  AST  20     /  ALT  12     /  AlkPhos  168    09 Oct 2020 05:55      A/P: 65yMale POD# s/p   - Stable  - Pain Control  - DVT ppx:  - PT, WBS:     Ortho Pager [phone] Ortho Note    Pt frustrated and upset in lack of physical progress of his care.  Explained to pt that recovery takes time and requires a great deal of physical therapy.  Pt agrees with plan.   Denies CP, SOB, N/V, numbness/tingling down le b/l.     Vital Signs Last 24 Hrs  T(C): 36.7 (10-09-20 @ 08:33), Max: 36.7 (10-09-20 @ 08:33)  T(F): 98.1 (10-09-20 @ 08:33), Max: 98.1 (10-09-20 @ 08:33)  HR: 84 (10-09-20 @ 08:33) (84 - 84)  BP: 101/62 (10-09-20 @ 08:33) (101/62 - 101/62)  BP(mean): --  RR: 16 (10-09-20 @ 08:33) (16 - 16)  SpO2: 97% (10-09-20 @ 08:33) (97% - 97%)      General: Pt Alert and oriented, NAD  DSG ace wrap C/D/I  Pulses:  DPs palpable b/l le   Sensation:  SILT throughout distal le b/l   Motor: EHL/FHL/TA/GS 5/5 b/l le   calves soft compressible NTTP                          8.4    7.59  )-----------( 444      ( 09 Oct 2020 05:56 )             29.7   09 Oct 2020 05:55    132    |  96     |  18     ----------------------------<  132    4.8     |  27     |  0.87     Ca    8.2        09 Oct 2020 05:55    TPro  6.2    /  Alb  2.2    /  TBili  0.4    /  DBili  x      /  AST  20     /  ALT  12     /  AlkPhos  168    09 Oct 2020 05:55      A/P: 65yMale admitted for R knee pain, drainage s/p right knee arthroscopic I+D 7/17, right knee explant and abx spacer 7/22 with Dr. Castro;  and debridement of LLE wound with wound vac placement on 7/30 by Dr. Bowen. Was original d/c'd home on 8/10/20 with PICC and vancomycin IV q12h + rifampin.  Readmitted and s/p R hip exchange of hardware on 09/04/2020; I+D of RUQ on 09/04/2020; R Knee Revision Antibiotic Spacer by Dr. Castro, R Knee medial gastroc flap and LLE STSG by Dr. Mckeon on 9/18. Discharged to [facility] on 10/3/20 and returned to [facility] ER the same day with reports of uncontrolled pain and lack of medical treatment at [facility]  - AM labs EVERY OTHER DAY plan to f/u with ID Dr. Casillas outpatient; will consult with any change in patient status or lab results  - medical and cardiology co-management; no current need for telemetry monitoring as per recs  - Pain Control- appreciate pain management recs  - DVT ppx: ASA + effient (home regimen)  - PT, WBS: TTWB RLE in seamus brace; WBAT LLE  - dressing changes as per plastics recs (Dr. Calvillo) on 10/2 prior to previous d/c- bacitracin, adaptiq, abd pads to LLE and RLE skin graft sites; RLE to be wrapped loosely in ace wrap  - podiatry debridements from prior admission well-appearing- will reconsult as needed; follow-up outpatient  - RUQ drain site not actively draining at this time- plan to follow-up with Dr. Latif regarding elective cholecystectomy in the future  - OOB for meals as tolerated, aggressive I/S  - bowel regimen  - dispo: [facility] placement  Ortho Pager [phone]

## 2020-10-09 NOTE — PROGRESS NOTE ADULT - SUBJECTIVE AND OBJECTIVE BOX
Interval Events: Reviewed  Patient seen and examined at bedside.    Patient is a 65y old  Male who presents with a chief complaint of knee pain (10 Oct 2020 07:03)    he is doing better and waiting for rehab  PAST MEDICAL & SURGICAL HISTORY:  Diabetic neuropathy    STEMI (ST elevation myocardial infarction)    Diverticulitis    MRSA bacteremia    History of celiac disease    CHF (congestive heart failure)  EF ~ 25%    HTN (hypertension)    Diabetes  on insulin pump    Blood clot due to device, implant, or graft  was on blood thinners    HLD (hyperlipidemia)    Osteoarthritis    Atherosclerosis of coronary artery  CAD (coronary artery disease)    Status post percutaneous transluminal coronary angioplasty  in 2012    History of open reduction and internal fixation (ORIF) procedure    Surgery, elective  Right shoulder    Surgery, elective  right knee wound debridement    S/P TKR (total knee replacement), right  with infection Mrsa   per pt he was cleared from MRSA infection    S/P CABG x 1  2018    Stented coronary artery  10/18 heart attack  INFERIOR WALL MI    Other postprocedural status  Fixation hardware in foot LEFT        MEDICATIONS:  Pulmonary:    Antimicrobials:  rifAMPin 300 milliGRAM(s) Oral every 12 hours  vancomycin  IVPB 1250 milliGRAM(s) IV Intermittent every 24 hours    Anticoagulants:  heparin  Lock Flush 100 Units/mL Injectable 300 Unit(s) IV Push daily  prasugrel 10 milliGRAM(s) Oral daily    Cardiac:  digoxin     Tablet 0.125 milliGRAM(s) Oral daily  furosemide    Tablet 20 milliGRAM(s) Oral daily  metoprolol succinate ER 25 milliGRAM(s) Oral daily  spironolactone 25 milliGRAM(s) Oral daily  tamsulosin 0.4 milliGRAM(s) Oral at bedtime      Allergies    ACE inhibitors (Hives)  carvedilol (Other)  enalapril (Hives)  Entresto (Other)    Intolerances        Vital Signs Last 24 Hrs  T(C): 36.8 (10 Oct 2020 05:24), Max: 36.9 (09 Oct 2020 12:13)  T(F): 98.3 (10 Oct 2020 05:24), Max: 98.4 (09 Oct 2020 12:13)  HR: 81 (10 Oct 2020 05:24) (74 - 84)  BP: 109/61 (10 Oct 2020 05:24) (97/60 - 109/61)  BP(mean): 77 (10 Oct 2020 05:24) (77 - 77)  RR: 18 (10 Oct 2020 05:24) (16 - 18)  SpO2: 95% (10 Oct 2020 05:24) (94% - 98%)    10-09 @ 07:01  -  10-10 @ 07:00  --------------------------------------------------------  IN: 1370 mL / OUT: 1751 mL / NET: -381 mL          Review of Systems:   •	General: negative  •	Skin/Breast: negative  •	Ophthalmologic: negative  •	ENMT: negative  •	Respiratory and Thorax: negative  •	Cardiovascular: negative  •	Gastrointestinal: negative  •	Genitourinary: negative  •	Musculoskeletal: negative  •	Neurological: negative  •	Psychiatric: negative  •	Hematology/Lymphatics: negative  •	Endocrine: negative  •	Allergic/Immunologic: negative    Physical Exam:   • Constitutional:	Well-developed, well nourished  • Eyes:	EOMI; PERRL; no drainage or redness  • ENMT:	No oral lesions; no gross abnormalities  • Neck	No bruits; no thyromegaly or nodules  • Breasts:	not examined  • Back:	No deformity or limitation of movement  • Respiratory:	Breath Sounds equal & clear to percussion & auscultation, no accessory muscle use  • Cardiovascular:	Regular rate & rhythm, normal S1, S2; no murmurs, gallops or rubs; no S3, S4  • Gastrointestinal:	Soft, non-tender, no hepatosplenomegaly, normal bowel sounds  • Genitourinary:	not examined  • Rectal: not examined  • Extremities:	No cyanosis, clubbing or edema  • Vascular:	Equal and normal pulses (carotid, femoral, dorsalis pedis)  • Neurologica:l	not examined  • Skin:	No lesions; no rash  • Lymph Nodes:	No lymphadedenopathy  • Musculoskeletal:	No joint pain, swelling or deformity; no limitation of movement        LABS:      CBC Full  -  ( 09 Oct 2020 05:56 )  WBC Count : 7.59 K/uL  RBC Count : 3.96 M/uL  Hemoglobin : 8.4 g/dL  Hematocrit : 29.7 %  Platelet Count - Automated : 444 K/uL  Mean Cell Volume : 75.0 fl  Mean Cell Hemoglobin : 21.2 pg  Mean Cell Hemoglobin Concentration : 28.3 gm/dL  Auto Neutrophil # : 5.38 K/uL  Auto Lymphocyte # : 0.80 K/uL  Auto Monocyte # : 1.02 K/uL  Auto Eosinophil # : 0.27 K/uL  Auto Basophil # : 0.09 K/uL  Auto Neutrophil % : 70.9 %  Auto Lymphocyte % : 10.5 %  Auto Monocyte % : 13.4 %  Auto Eosinophil % : 3.6 %  Auto Basophil % : 1.2 %    10-09    132<L>  |  96  |  18  ----------------------------<  132<H>  4.8   |  27  |  0.87    Ca    8.2<L>      09 Oct 2020 05:55    TPro  6.2  /  Alb  2.2<L>  /  TBili  0.4  /  DBili  x   /  AST  20  /  ALT  12  /  AlkPhos  168<H>  10-09                        RADIOLOGY & ADDITIONAL STUDIES (The following images were personally reviewed):  Loja:                                     No  Urine output:                       adequate  DVT prophylaxis:                 Yes  Flattus:                                  Yes  Bowel movement:              No

## 2020-10-09 NOTE — PROGRESS NOTE ADULT - SUBJECTIVE AND OBJECTIVE BOX
Pain Management Progress Note - [facility] [phone]        HPI: 65yMale admitted for R knee pain, drainage s/p right knee arthroscopic I+D 7/17, right knee explant and abx spacer 7/22 with Dr. Castro;  and debridement of LLE wound with wound vac placement on 7/30 by Dr. Bowen. Was original d/c'd home on 8/10/20 with PICC and vancomycin IV q12h + rifampin.  Readmitted and s/p R hip exchange of hardware on 09/04/2020; I+D of RUQ on 09/04/2020; R Knee Revision Antibiotic Spacer by Dr. Castro, R Knee medial gastroc flap and LLE STSG by Dr. Mckeon on 9/18. Discharged to [facility] on 10/3/20 and returned to [facility] ER the same day with reports of uncontrolled pain and lack of medical treatment at [facility]. Patient reports lower back pain and RLE pain, pain managed today with current pain medication regimen. Patient Axox3, denies any side effects from current pain medication regimen. Patient R leg wrapped with an ace bandage, cd,i,       Pain is __x_ sharp ____dull ___burning _x__achy ___ Intensity: __x__ mild _x_mod ___severe     Location ____surgical site ____cervical _____lumbar ____abd ____upper ext___x_R lower ext    Worse with __x__activity __x__movement _____physical therapy___ Rest    Improved with _x___medication _x___rest ____physical therapy      morphine ER Tablet  morphine  IR  morphine  - Injectable  heparin  Lock Flush 100 Units/mL Injectable  gabapentin  morphine ER Tablet  BACItracin   Ointment  morphine  - Injectable  BACItracin   Ointment  diphenhydrAMINE  morphine  - Injectable  insulin lispro Injectable (HumaLOG)  insulin glargine Injectable (LANTUS)  furosemide   Injectable  glucagon  Injectable  dextrose 50% Injectable  dextrose 50% Injectable  dextrose 50% Injectable  dextrose 40% Gel  dextrose 5%.  insulin lispro (HumaLOG) corrective regimen sliding scale  furosemide    Tablet  metoprolol succinate ER  atorvastatin  sertraline  DULoxetine  digoxin     Tablet  tamsulosin  spironolactone  prasugrel  aspirin  rifAMPin  vancomycin  IVPB  morphine ER Tablet  morphine  - Injectable  gabapentin  cyclobenzaprine  morphine  IR  senna  bisacodyl Suppository  polyethylene glycol 3350  magnesium hydroxide Suspension  ondansetron Injectable  aluminum hydroxide/magnesium hydroxide/simethicone Suspension  oxyCODONE    IR  oxyCODONE    IR  acetaminophen   Tablet ..  morphine  - Injectable      ROS: Const:  _-__febrile   Eyes:___ENT:___CV: _-__chest pain  Resp: __-__sob  GI:_-__nausea _-__vomiting _-__abd pain ___npo ___clears x__full diet __bm  :___ Musk: _x__pain _-__spasm  Skin:___ Neuro:  _-__somyfigz_-__nrtqptotn__-_ numbness _-__weakness _-__paresth  Psych:__anxiety  Endo:___ Heme:___Allergy:_________, _x__all others reviewed and negative      PAST MEDICAL & SURGICAL HISTORY:  Diabetic neuropathy  STEMI (ST elevation myocardial infarction)  Diverticulitis  MRSA bacteremia  History of celiac disease  CHF (congestive heart failure)  EF ~ 25%  HTN (hypertension)  Diabetes  on insulin pump  Blood clot due to device, implant, or graft  was on blood thinners  HLD (hyperlipidemia)  Osteoarthritis  Atherosclerosis of coronary artery  CAD (coronary artery disease)  Status post percutaneous transluminal coronary angioplasty  in 2012  History of open reduction and internal fixation (ORIF) procedure  Surgery, elective  Right shoulder  Surgery, elective  right knee wound debridement  S/P TKR (total knee replacement), right  with infection Mrsa   per pt he was cleared from MRSA infection  S/P CABG x 1  2018  Stented coronary artery  10/18 heart attack  INFERIOR WALL MI  Other postprocedural status  Fixation hardware in foot LEFT      10-09 @ 05:5592 mL/min/1.73M2      Hemoglobin: 8.4 g/dL (10-09 @ 05:56)        T(C): 36.7 (10-09-20 @ 08:33), Max: 37.1 (10-08-20 @ 19:12)  HR: 84 (10-09-20 @ 08:33) (78 - 86)  BP: 101/62 (10-09-20 @ 08:33) (99/53 - 120/54)  RR: 16 (10-09-20 @ 08:33) (16 - 18)  SpO2: 97% (10-09-20 @ 08:33) (90% - 98%)  Wt(kg): --        PHYSICAL EXAM:  Gen Appearance: x___no acute distress _x__appropriate        Neuro: _x__SILT feet____ EOM Intact Psych: AAOX_3_, _x__mood/affect appropriate        Eyes: _x__conjunctiva WNL  __x___ Pupils equal and round        ENT: _x__ears and nose atraumatic__x_ Hearing grossly intact        Neck: _x__trachea midline, no visible masses ___thyroid without palpable mass    Resp: _x__Nml WOB____No tactile fremitus ___clear to auscultation    Cardio: _x__extremities free from edema __x__pedal pulses palpable    GI/Abdomen: __x_soft ___x__ Nontender__x____Nondistended_____HSM    Lymphatic: ___no palpable nodes in neck  ___no palpable nodes calves and feet    Skin/Wound: ___Incision, ___Dressing c/d/i,   ____surrounding tissues soft,  ___drain/chest tube present____    Muscular: EHL _4__/5  Gastrocnemius__4_/5    ___absent clubbing/cyanosis        ASSESSMENT: As per HPI: 65yMale admitted for R knee pain, drainage s/p right knee arthroscopic I+D 7/17, right knee explant and abx spacer 7/22 with Dr. Castro;  and debridement of LLE wound with wound vac placement on 7/30 by Dr. Bowen. Was original d/c'd home on 8/10/20 with PICC and vancomycin IV q12h + rifampin.  Readmitted and s/p R hip exchange of hardware on 09/04/2020; I+D of RUQ on 09/04/2020; R Knee Revision Antibiotic Spacer by Dr. Castro, R Knee medial gastroc flap and LLE STSG by Dr. Mckeon on 9/18. Discharged to [facility] on 10/3/20 and returned to [facility] ER the same day with reports of uncontrolled pain and lack of medical treatment at [facility]. Patient reports RLE pain and lower back pain, patient reports pain relief with current pain medication regimen.       Recommended Treatment PLAN:  1. Morphine IR 15mg PO Q4h prn severe pain  2. Mscontin 30mg PO BID  3. Gabapentin 400mg PO TID  4. tylenol 975mg PO Q8  Plan discussed with Dr. Chavez     Pain Management Progress Note - [facility] [phone]        HPI: 65yMale admitted for R knee pain, drainage s/p right knee arthroscopic I+D 7/17, right knee explant and abx spacer 7/22 with Dr. Castro;  and debridement of LLE wound with wound vac placement on 7/30 by Dr. Bowen. Was original d/c'd home on 8/10/20 with PICC and vancomycin IV q12h + rifampin.  Readmitted and s/p R hip exchange of hardware on 09/04/2020; I+D of RUQ on 09/04/2020; R Knee Revision Antibiotic Spacer by Dr. Castro, R Knee medial gastroc flap and LLE STSG by Dr. Mckeon on 9/18. Discharged to [facility] [place] on 10/3/20 and returned to [facility] ER the same day with reports of uncontrolled pain and lack of medical treatment at [facility] [place]. Patient reports lower back pain and RLE pain, pain managed today with current pain medication regimen. Patient Axox3, denies any side effects from current pain medication regimen. Patient R leg wrapped with an ace bandage, cd,i,       Pain is __x_ sharp ____dull ___burning _x__achy ___ Intensity: __x__ mild _x_mod ___severe     Location ____surgical site ____cervical _____lumbar ____abd ____upper ext___x_R lower ext    Worse with __x__activity __x__movement _____physical therapy___ Rest    Improved with _x___medication _x___rest ____physical therapy      morphine ER Tablet  morphine  IR  morphine  - Injectable  heparin  Lock Flush 100 Units/mL Injectable  gabapentin  morphine ER Tablet  BACItracin   Ointment  morphine  - Injectable  BACItracin   Ointment  diphenhydrAMINE  morphine  - Injectable  insulin lispro Injectable (HumaLOG)  insulin glargine Injectable (LANTUS)  furosemide   Injectable  glucagon  Injectable  dextrose 50% Injectable  dextrose 50% Injectable  dextrose 50% Injectable  dextrose 40% Gel  dextrose 5%.  insulin lispro (HumaLOG) corrective regimen sliding scale  furosemide    Tablet  metoprolol succinate ER  atorvastatin  sertraline  DULoxetine  digoxin     Tablet  tamsulosin  spironolactone  prasugrel  aspirin  rifAMPin  vancomycin  IVPB  morphine ER Tablet  morphine  - Injectable  gabapentin  cyclobenzaprine  morphine  IR  senna  bisacodyl Suppository  polyethylene glycol 3350  magnesium hydroxide Suspension  ondansetron Injectable  aluminum hydroxide/magnesium hydroxide/simethicone Suspension  oxyCODONE    IR  oxyCODONE    IR  acetaminophen   Tablet ..  morphine  - Injectable      ROS: Const:  _-__febrile   Eyes:___ENT:___CV: _-__chest pain  Resp: __-__sob  GI:_-__nausea _-__vomiting _-__abd pain ___npo ___clears x__full diet __bm  :___ Musk: _x__pain _-__spasm  Skin:___ Neuro:  _-__kzmizcrv_-__yopnbwhbd__-_ numbness _-__weakness _-__paresth  Psych:__anxiety  Endo:___ Heme:___Allergy:_________, _x__all others reviewed and negative      PAST MEDICAL & SURGICAL HISTORY:  Diabetic neuropathy  STEMI (ST elevation myocardial infarction)  Diverticulitis  MRSA bacteremia  History of celiac disease  CHF (congestive heart failure)  EF ~ 25%  HTN (hypertension)  Diabetes  on insulin pump  Blood clot due to device, implant, or graft  was on blood thinners  HLD (hyperlipidemia)  Osteoarthritis  Atherosclerosis of coronary artery  CAD (coronary artery disease)  Status post percutaneous transluminal coronary angioplasty  in 2012  History of open reduction and internal fixation (ORIF) procedure  Surgery, elective  Right shoulder  Surgery, elective  right knee wound debridement  S/P TKR (total knee replacement), right  with infection Mrsa   per pt he was cleared from MRSA infection  S/P CABG x 1  2018  Stented coronary artery  10/18 heart attack  INFERIOR WALL MI  Other postprocedural status  Fixation hardware in foot LEFT      10-09 @ 05:5592 mL/min/1.73M2      Hemoglobin: 8.4 g/dL (10-09 @ 05:56)        T(C): 36.7 (10-09-20 @ 08:33), Max: 37.1 (10-08-20 @ 19:12)  HR: 84 (10-09-20 @ 08:33) (78 - 86)  BP: 101/62 (10-09-20 @ 08:33) (99/53 - 120/54)  RR: 16 (10-09-20 @ 08:33) (16 - 18)  SpO2: 97% (10-09-20 @ 08:33) (90% - 98%)  Wt(kg): --        PHYSICAL EXAM:  Gen Appearance: x___no acute distress _x__appropriate        Neuro: _x__SILT feet____ EOM Intact Psych: AAOX_3_, _x__mood/affect appropriate        Eyes: _x__conjunctiva WNL  __x___ Pupils equal and round        ENT: _x__ears and nose atraumatic__x_ Hearing grossly intact        Neck: _x__trachea midline, no visible masses ___thyroid without palpable mass    Resp: _x__Nml WOB____No tactile fremitus ___clear to auscultation    Cardio: _x__extremities free from edema __x__pedal pulses palpable    GI/Abdomen: __x_soft ___x__ Nontender__x____Nondistended_____HSM    Lymphatic: ___no palpable nodes in neck  ___no palpable nodes calves and feet    Skin/Wound: ___Incision, ___Dressing c/d/i,   ____surrounding tissues soft,  ___drain/chest tube present____    Muscular: EHL _4__/5  Gastrocnemius__4_/5    ___absent clubbing/cyanosis        ASSESSMENT: As per HPI: 65yMale admitted for R knee pain, drainage s/p right knee arthroscopic I+D 7/17, right knee explant and abx spacer 7/22 with Dr. Castro;  and debridement of LLE wound with wound vac placement on 7/30 by Dr. Bowen. Was original d/c'd home on 8/10/20 with PICC and vancomycin IV q12h + rifampin.  Readmitted and s/p R hip exchange of hardware on 09/04/2020; I+D of RUQ on 09/04/2020; R Knee Revision Antibiotic Spacer by Dr. Castro, R Knee medial gastroc flap and LLE STSG by Dr. Mckeon on 9/18. Discharged to [facility] [place] on 10/3/20 and returned to [facility] ER the same day with reports of uncontrolled pain and lack of medical treatment at [facility] [place]. Patient reports RLE pain and lower back pain, patient reports pain relief with current pain medication regimen.       Recommended Treatment PLAN:  1. Morphine IR 15mg PO Q4h prn severe pain  2. Mscontin 30mg PO BID  3. Gabapentin 400mg PO TID  4. tylenol 975mg PO Q8  Plan discussed with Dr. Chavez    Addendum: Patient seen on rounds, continuing to reports pain to RLE, reviewed pain medication regimen with patient and Dr. Chavez at bedside.   Recommended Treatment PLAN:  1. Morphine IR 15mg PO Q4h prn severe pain  2. Mscontin 30mg PO TID  3. Gabapentin 400mg PO TID  4. tylenol 975mg PO Q8  Plan discussed with Dr. Chavez

## 2020-10-09 NOTE — PROGRESS NOTE ADULT - SUBJECTIVE AND OBJECTIVE BOX
Ortho Note    Pt seen and examined. Pain controlled today.  No complaints overnight.  Denies CP/N/V/SOB    Vital Signs Last 24 Hrs  T(C): 36.8 (09 Oct 2020 05:34), Max: 37.1 (08 Oct 2020 19:12)  T(F): 98.2 (09 Oct 2020 05:34), Max: 98.8 (08 Oct 2020 19:12)  HR: 78 (09 Oct 2020 05:34) (76 - 86)  BP: 110/71 (09 Oct 2020 05:34) (99/53 - 120/54)  BP(mean): --  RR: 18 (09 Oct 2020 05:34) (18 - 18)  SpO2: 95% (09 Oct 2020 05:34) (90% - 98%)    RLE:  DSG: R knee ace wrap changed, CDI; flap healing well; no drainage  Pulses: +DP;  feet cool to touch; at baseline; no edema  Sensation: Sensation intact to baseline (diminished sensation- h/o severe neuropathy; follows with vascular Dr. Malloy outpatient)  Motor: 5/5 EHL/FHL    LLE:  DSG: abd pad/ kerlix CDI to vascular wound  Pulses: feet cool to touch; at baseline; no edema  Sensation: SILT to baseline (diminished sensation- h/o severe neuropathy; follows with vascular Dr. Malloy outpatient)  Motor: 5/5 EHL/FHL/TA/GS                        A/P: 65yMale admitted for R knee pain, drainage s/p right knee arthroscopic I+D 7/17, right knee explant and abx spacer 7/22 with Dr. Castro;  and debridement of LLE wound with wound vac placement on 7/30 by Dr. Bowen. Was original d/c'd home on 8/10/20 with PICC and vancomycin IV q12h + rifampin.  Readmitted and s/p R hip exchange of hardware on 09/04/2020; I+D of RUQ on 09/04/2020; R Knee Revision Antibiotic Spacer by Dr. Castro, R Knee medial gastroc flap and LLE STSG by Dr. Mckeon on 9/18. Discharged to [facility] on 10/3/20 and returned to [facility] ER the same day with reports of uncontrolled pain and lack of medical treatment at [facility]  - AM labs EVERY OTHER DAY (f/u AM labs today) plan to f/u with ID Dr. Casillas outpatient; will consult with any change in patient status or lab results  - medical and cardiology co-management; no current need for telemetry monitoring as per recs  - Pain Control- appreciate pain management recs; 1x dose morphine 2mg IV to be given prior to PT today  - DVT ppx: ASA + effient (home regimen)  - PT, WBS: TTWB RLE in seamus brace; WBAT LLE  - dressing changes as per plastics recs (Dr. Calvillo) on 10/2 prior to previous d/c- bacitracin, adaptiq, abd pads to LLE and RLE skin graft sites; RLE to be wrapped loosely in ace wrap  - podiatry debridements from prior admission well-appearing- will reconsult as needed; follow-up outpatient  - RUQ drain site not actively draining at this time- plan to follow-up with Dr. Latif regarding elective cholecystectomy in the future  - OOB for meals as tolerated, aggressive I/S  - bowel regimen  - dispo: PINKY placement      Ortho Pager [phone]

## 2020-10-09 NOTE — PROGRESS NOTE ADULT - PROBLEM SELECTOR PLAN 1
on broad spectrum antibiotics,   f/u intraop c/s  f/u vanco level and vanco was adjusted  I will discuss with ortho regarding the recent cultures from the right hip and is negative but the abdominal wound cultures are positive fro VREF and Pseudomonas.  He is on vanco and Rifampin  culture positive for MRSA with negative blood culture repeat but 4 bottles were positive on admission  JOHN PAUL unlikely endocarditis but might need to be treated as such.  The AICD bed is stable with no signs of infection  On Vanco and Rifampin, PICC line Monday.   hardware removed and follow on cultures which is negative to date  wound is stable   I

## 2020-10-09 NOTE — PROGRESS NOTE ADULT - PROBLEM SELECTOR PLAN 3
Continue insulin sliding scale. Maintain her blood sugar in the range between 140- 180, and not below 70. Monitor for hypoglycemia. Monitor blood sugar with advancing diet. Hold oral hypoglycemic agents during hospitalization. Adjust insulin..  BS is controlled. Started Lantus and Pre-meal insulin.

## 2020-10-09 NOTE — PROGRESS NOTE ADULT - PROBLEM SELECTOR PLAN 2
s/p arthroscopic I and D, 7/17/2020.  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 right knee explant and antibiotic spacer.  Will follow with ID regarding the right hip cultures which is negative to date and does not look infected  repeat cultures from the last surgery negative to date.  PICC line.  s/p right knee spacer revision, right medial gastroc flap and left STSG in 9/18/2020  He has a brace on the knee and the graft site healed

## 2020-10-09 NOTE — PROGRESS NOTE ADULT - SUBJECTIVE AND OBJECTIVE BOX
INTERVAL HISTORY:  No dyspnea	  Chart, [facility] medications and allergies reviewed    MEDICATIONS:  MEDICATIONS  (STANDING):  aspirin 81 milliGRAM(s) Oral daily  atorvastatin 40 milliGRAM(s) Oral at bedtime  BACItracin   Ointment 1 Application(s) Topical daily  BACItracin   Ointment 1 Application(s) Topical daily  dextrose 5%. 1000 milliLiter(s) (50 mL/Hr) IV Continuous <Continuous>  dextrose 50% Injectable 12.5 Gram(s) IV Push once  dextrose 50% Injectable 25 Gram(s) IV Push once  dextrose 50% Injectable 25 Gram(s) IV Push once  digoxin     Tablet 0.125 milliGRAM(s) Oral daily  DULoxetine 30 milliGRAM(s) Oral daily  furosemide    Tablet 20 milliGRAM(s) Oral daily  gabapentin 400 milliGRAM(s) Oral three times a day  heparin  Lock Flush 100 Units/mL Injectable 300 Unit(s) IV Push daily  insulin glargine Injectable (LANTUS) 10 Unit(s) SubCutaneous at bedtime  insulin lispro (HumaLOG) corrective regimen sliding scale   SubCutaneous Before meals and at bedtime  insulin lispro Injectable (HumaLOG) 4 Unit(s) SubCutaneous three times a day before meals  metoprolol succinate ER 25 milliGRAM(s) Oral daily  morphine ER Tablet 30 milliGRAM(s) Oral two times a day  polyethylene glycol 3350 17 Gram(s) Oral daily  prasugrel 10 milliGRAM(s) Oral daily  rifAMPin 300 milliGRAM(s) Oral every 12 hours  sertraline 25 milliGRAM(s) Oral daily  spironolactone 25 milliGRAM(s) Oral daily  tamsulosin 0.4 milliGRAM(s) Oral at bedtime  vancomycin  IVPB 1250 milliGRAM(s) IV Intermittent every 24 hours      REVIEW OF SYSTEMS:  CONSTITUTIONAL: No fever, no weight loss, no fatigue  PULMONARY: No cough, no wheezing, chills no hemoptysis; No Shortness of Breath  CARDIOVASCULAR: No chest pain, no palpitations, no syncope, dizziness, no leg swelling  GASTROINTESTINAL: No abdominal pain. No nausea, no  vomiting, no hematemesis; No diarrhea, no constipation. No melena, no hematochezia.  GENITOURINARY: No dysuria, no frequency, no hematuria, no incontinence  SKIN: No itching, no burning, no rashes, no lesions     PHYSICAL EXAM:  T(C): 36.8 (10-09-20 @ 05:34), Max: 37.1 (10-08-20 @ 19:12)  HR: 78 (10-09-20 @ 05:34) (76 - 86)  BP: 110/71 (10-09-20 @ 05:34) (99/53 - 120/54)  RR: 18 (10-09-20 @ 05:34) (18 - 18)  SpO2: 95% (10-09-20 @ 05:34) (90% - 98%)  Wt(kg): --  I&O's Summary    08 Oct 2020 07:01  -  09 Oct 2020 07:00  --------------------------------------------------------  IN: 0 mL / OUT: 1300 mL / NET: -1300 mL        Appearance:  No deformities, normal appearance	  HEENT:   Normal oral mucosa, PERRL, EOMI	  Neck: No jvd , no masses  Lymphatic: No  lymphadenopathy  Pulmonary: Lungs clear to auscultation and percussion  Cardiovascular: Normal S1 S2, No JVD, No murmur, tr edema	  Abdomen:  Soft, Non-tender, + BS  Skin: No rashes, No ecchymoses	  Extremities:  No clubbing or cyanosis  MSK: Normal range of motion, no joint swelling    LABS:		  CARDIAC MARKERS:                         8.4    7.59  )-----------( 444      ( 09 Oct 2020 05:56 )             29.7   10-09    132<L>  |  96  |  18  ----------------------------<  132<H>  4.8   |  27  |  0.87    Ca    8.2<L>      09 Oct 2020 05:55    TPro  6.2  /  Alb  2.2<L>  /  TBili  0.4  /  DBili  x   /  AST  20  /  ALT  12  /  AlkPhos  168<H>  10-09    proBNP:  Lipid Profile:  HgA1c:  TSH:      TELEMETRY: 	      QTC     ECG:  	   QTC         RADIOLOGY:   DIAGNOSTIC TESTING: [ ] Echocardiogram: [ ]  Catheterization: [ ] Stress Test:      ASSESSMENT/PLAN: 	  Severely reduced ejection fraction-Dyspnea is improved after furosemide.   Mild edema. Continue metoprolol digoxin furosemide and spironolactone.    Coronary artery disease-the patient denies chest discomfort.  Continue prasugrel and aspirin.     Ventricular tachycardia/Defibrillator- asymptomatic.  Off monitor.     Hyponatremia-mild, follow bmp.    Septic knee- as per ID and orthopedics.

## 2020-10-10 LAB
CULTURE RESULTS: NO GROWTH — SIGNIFICANT CHANGE UP
GLUCOSE BLDC GLUCOMTR-MCNC: 127 MG/DL — HIGH (ref 70–99)
GLUCOSE BLDC GLUCOMTR-MCNC: 207 MG/DL — HIGH (ref 70–99)
GLUCOSE BLDC GLUCOMTR-MCNC: 211 MG/DL — HIGH (ref 70–99)
GLUCOSE BLDC GLUCOMTR-MCNC: 230 MG/DL — HIGH (ref 70–99)
GLUCOSE BLDC GLUCOMTR-MCNC: 332 MG/DL — HIGH (ref 70–99)
SPECIMEN SOURCE: SIGNIFICANT CHANGE UP

## 2020-10-10 PROCEDURE — 99232 SBSQ HOSP IP/OBS MODERATE 35: CPT

## 2020-10-10 RX ORDER — FUROSEMIDE 40 MG
20 TABLET ORAL ONCE
Refills: 0 | Status: COMPLETED | OUTPATIENT
Start: 2020-10-10 | End: 2020-10-10

## 2020-10-10 RX ADMIN — PRASUGREL 10 MILLIGRAM(S): 5 TABLET, FILM COATED ORAL at 12:15

## 2020-10-10 RX ADMIN — Medication 4 UNIT(S): at 07:17

## 2020-10-10 RX ADMIN — MORPHINE SULFATE 30 MILLIGRAM(S): 50 CAPSULE, EXTENDED RELEASE ORAL at 15:43

## 2020-10-10 RX ADMIN — MORPHINE SULFATE 15 MILLIGRAM(S): 50 CAPSULE, EXTENDED RELEASE ORAL at 11:47

## 2020-10-10 RX ADMIN — Medication 1 APPLICATION(S): at 21:48

## 2020-10-10 RX ADMIN — Medication 1 APPLICATION(S): at 21:47

## 2020-10-10 RX ADMIN — CYCLOBENZAPRINE HYDROCHLORIDE 5 MILLIGRAM(S): 10 TABLET, FILM COATED ORAL at 15:08

## 2020-10-10 RX ADMIN — Medication 20 MILLIGRAM(S): at 22:23

## 2020-10-10 RX ADMIN — MORPHINE SULFATE 30 MILLIGRAM(S): 50 CAPSULE, EXTENDED RELEASE ORAL at 09:12

## 2020-10-10 RX ADMIN — Medication 0.12 MILLIGRAM(S): at 06:19

## 2020-10-10 RX ADMIN — DULOXETINE HYDROCHLORIDE 30 MILLIGRAM(S): 30 CAPSULE, DELAYED RELEASE ORAL at 12:15

## 2020-10-10 RX ADMIN — GABAPENTIN 400 MILLIGRAM(S): 400 CAPSULE ORAL at 15:08

## 2020-10-10 RX ADMIN — MORPHINE SULFATE 30 MILLIGRAM(S): 50 CAPSULE, EXTENDED RELEASE ORAL at 00:14

## 2020-10-10 RX ADMIN — Medication 4 UNIT(S): at 17:53

## 2020-10-10 RX ADMIN — INSULIN GLARGINE 10 UNIT(S): 100 INJECTION, SOLUTION SUBCUTANEOUS at 21:45

## 2020-10-10 RX ADMIN — MORPHINE SULFATE 15 MILLIGRAM(S): 50 CAPSULE, EXTENDED RELEASE ORAL at 12:47

## 2020-10-10 RX ADMIN — Medication 166.67 MILLIGRAM(S): at 21:44

## 2020-10-10 RX ADMIN — Medication 2: at 21:44

## 2020-10-10 RX ADMIN — Medication 4 UNIT(S): at 12:18

## 2020-10-10 RX ADMIN — GABAPENTIN 400 MILLIGRAM(S): 400 CAPSULE ORAL at 06:19

## 2020-10-10 RX ADMIN — Medication 81 MILLIGRAM(S): at 12:15

## 2020-10-10 RX ADMIN — SERTRALINE 25 MILLIGRAM(S): 25 TABLET, FILM COATED ORAL at 12:15

## 2020-10-10 RX ADMIN — MORPHINE SULFATE 30 MILLIGRAM(S): 50 CAPSULE, EXTENDED RELEASE ORAL at 22:10

## 2020-10-10 RX ADMIN — Medication 25 MILLIGRAM(S): at 06:19

## 2020-10-10 RX ADMIN — Medication 2: at 07:17

## 2020-10-10 RX ADMIN — ATORVASTATIN CALCIUM 40 MILLIGRAM(S): 80 TABLET, FILM COATED ORAL at 21:45

## 2020-10-10 RX ADMIN — Medication 20 MILLIGRAM(S): at 06:19

## 2020-10-10 RX ADMIN — MORPHINE SULFATE 30 MILLIGRAM(S): 50 CAPSULE, EXTENDED RELEASE ORAL at 21:48

## 2020-10-10 RX ADMIN — MORPHINE SULFATE 30 MILLIGRAM(S): 50 CAPSULE, EXTENDED RELEASE ORAL at 10:10

## 2020-10-10 RX ADMIN — MORPHINE SULFATE 30 MILLIGRAM(S): 50 CAPSULE, EXTENDED RELEASE ORAL at 16:40

## 2020-10-10 RX ADMIN — MORPHINE SULFATE 30 MILLIGRAM(S): 50 CAPSULE, EXTENDED RELEASE ORAL at 00:44

## 2020-10-10 RX ADMIN — SPIRONOLACTONE 25 MILLIGRAM(S): 25 TABLET, FILM COATED ORAL at 06:19

## 2020-10-10 RX ADMIN — Medication 300 UNIT(S): at 12:17

## 2020-10-10 RX ADMIN — GABAPENTIN 400 MILLIGRAM(S): 400 CAPSULE ORAL at 21:45

## 2020-10-10 NOTE — PROGRESS NOTE ADULT - SUBJECTIVE AND OBJECTIVE BOX
Ortho Note    Pt in good spirits this morning, says pain is finally under control. Felt a little SOB but says when this happens he sits upright in bed and feels better.  Denies CP, SOB, N/V, numbness/tingling down le b/l.     Vital Signs Last 24 Hrs  T(C): 36.8 (10 Oct 2020 05:24), Max: 36.9 (09 Oct 2020 12:13)  T(F): 98.3 (10 Oct 2020 05:24), Max: 98.4 (09 Oct 2020 12:13)  HR: 81 (10 Oct 2020 05:24) (74 - 84)  BP: 109/61 (10 Oct 2020 05:24) (97/60 - 109/61)  BP(mean): 77 (10 Oct 2020 05:24) (77 - 77)  RR: 18 (10 Oct 2020 05:24) (16 - 18)  SpO2: 95% (10 Oct 2020 05:24) (94% - 98%)    General: Pt Alert and oriented, NAD  DSG ace wrap C/D/I  Pulses:  DPs palpable b/l le   Sensation:  SILT throughout distal le b/l   Motor: EHL/FHL/TA/GS 5/5 b/l le   calves soft compressible NTTP               A/P: 65yMale admitted for R knee pain, drainage s/p right knee arthroscopic I+D 7/17, right knee explant and abx spacer 7/22 with Dr. Castro;  and debridement of LLE wound with wound vac placement on 7/30 by Dr. Bowen. Was original d/c'd home on 8/10/20 with PICC and vancomycin IV q12h + rifampin.  Readmitted and s/p R hip exchange of hardware on 09/04/2020; I+D of RUQ on 09/04/2020; R Knee Revision Antibiotic Spacer by Dr. Castro, R Knee medial gastroc flap and LLE STSG by Dr. Mckeon on 9/18. Discharged to [facility] on 10/3/20 and returned to [facility] ER the same day with reports of uncontrolled pain and lack of medical treatment at [facility]  - AM labs EVERY OTHER DAY plan to f/u with ID Dr. Casillas outpatient; will consult with any change in patient status or lab results  - Vanco trough therapeutic  - medical and cardiology co-management; no current need for telemetry monitoring as per recs  - Pain Control- appreciate pain management recs  - DVT ppx: ASA + effient (home regimen)  - PT, WBS: TTWB RLE in seamus brace; WBAT LLE  - dressing changes as per plastics recs (Dr. Calvillo) on 10/2 prior to previous d/c- bacitracin, adaptiq, abd pads to LLE and RLE skin graft sites; RLE to be wrapped loosely in ace wrap  - podiatry debridements from prior admission well-appearing- will reconsult as needed; follow-up outpatient  - RUQ drain site not actively draining at this time- plan to follow-up with Dr. Latif regarding elective cholecystectomy in the future  - OOB for meals as tolerated, aggressive I/S  - bowel regimen  - dispo: PINKY salgado  Ortho Pager [phone]

## 2020-10-10 NOTE — PROGRESS NOTE ADULT - SUBJECTIVE AND OBJECTIVE BOX
Interval Events: Reviewed  Patient seen and examined at bedside.    Patient is a 65y old  Male who presents with a chief complaint of R Knee Swelling (04 Oct 2020 08:56)    he had pain and the nursing home did not control his pain  no acute event overnight.    PAST MEDICAL & SURGICAL HISTORY:  Diabetic neuropathy    STEMI (ST elevation myocardial infarction)    Diverticulitis    MRSA bacteremia    History of celiac disease    CHF (congestive heart failure)  EF ~ 25%    HTN (hypertension)    Diabetes  on insulin pump    Blood clot due to device, implant, or graft  was on blood thinners    HLD (hyperlipidemia)    Osteoarthritis    Atherosclerosis of coronary artery  CAD (coronary artery disease)    Status post percutaneous transluminal coronary angioplasty  in 2012    History of open reduction and internal fixation (ORIF) procedure    Surgery, elective  Right shoulder    Surgery, elective  right knee wound debridement    S/P TKR (total knee replacement), right  with infection Mrsa   per pt he was cleared from MRSA infection    S/P CABG x 1  2018    Stented coronary artery  10/18 heart attack  INFERIOR WALL MI    Other postprocedural status  Fixation hardware in foot LEFT        MEDICATIONS:  Pulmonary:    Antimicrobials:  rifAMPin 300 milliGRAM(s) Oral every 12 hours  vancomycin  IVPB 1250 milliGRAM(s) IV Intermittent every 24 hours    Anticoagulants:  prasugrel 10 milliGRAM(s) Oral daily    Cardiac:  digoxin     Tablet 0.125 milliGRAM(s) Oral daily  furosemide    Tablet 20 milliGRAM(s) Oral daily  metoprolol succinate ER 25 milliGRAM(s) Oral daily  spironolactone 25 milliGRAM(s) Oral daily  tamsulosin 0.4 milliGRAM(s) Oral at bedtime      Allergies    ACE inhibitors (Hives)  carvedilol (Other)  enalapril (Hives)  Entresto (Other)    Intolerances        Vital Signs Last 24 Hrs  T(C): 36.6 (05 Oct 2020 16:43), Max: 36.8 (05 Oct 2020 14:34)  T(F): 97.9 (05 Oct 2020 16:43), Max: 98.3 (05 Oct 2020 14:34)  HR: 80 (05 Oct 2020 16:43) (71 - 91)  BP: 105/56 (05 Oct 2020 16:43) (94/50 - 111/68)  BP(mean): --  RR: 18 (05 Oct 2020 16:43) (18 - 19)  SpO2: 95% (05 Oct 2020 16:43) (94% - 97%)    10-04 @ 07:01  -  10-05 @ 07:00  --------------------------------------------------------  IN: 250 mL / OUT: 2620 mL / NET: -2370 mL    10-05 @ 07:01  -  10-05 @ 18:59  --------------------------------------------------------  IN: 720 mL / OUT: 351 mL / NET: 369 mL          Review of Systems:   •	General: negative  •	Skin/Breast: negative  •	Ophthalmologic: negative  •	ENMT: negative  •	Respiratory and Thorax: negative  •	Cardiovascular: negative  •	Gastrointestinal: negative  •	Genitourinary: negative  •	Musculoskeletal: negative  •	Neurological: negative  •	Psychiatric: negative  •	Hematology/Lymphatics: negative  •	Endocrine: negative  •	Allergic/Immunologic: negative    Physical Exam:   • Constitutional:	Well-developed, well nourished  • Eyes:	EOMI; PERRL; no drainage or redness  • ENMT:	No oral lesions; no gross abnormalities  • Neck	no thyromegaly or nodules  • Breasts:	not examined  • Back:	No deformity or limitation of movement  • Respiratory:	Breath Sounds equal & clear to auscultation, no accessory muscle use  • Cardiovascular:	Regular rate & rhythm, normal S1, S2; no murmurs, gallops or rubs; no S3, S4  • Gastrointestinal:	Soft, non-tender, no hepatosplenomegaly, normal bowel sounds  • Genitourinary:	not examined  • Rectal: not examined  • Extremities:	No cyanosis, clubbing or edema, right leg in knee immobilzer,   • Vascular:	Equal and normal pulses (dorsalis pedis)  • Neurologica:l	not examined  • Skin:	No lesions; no rash  • Lymph Nodes:	No lymphadedenopathy  • Musculoskeletal:	No joint pain, swelling or deformity; no limitation of movement        LABS:      CBC Full  -  ( 05 Oct 2020 06:22 )  WBC Count : 9.33 K/uL  RBC Count : 3.91 M/uL  Hemoglobin : 8.3 g/dL  Hematocrit : 29.6 %  Platelet Count - Automated : 487 K/uL  Mean Cell Volume : 75.7 fl  Mean Cell Hemoglobin : 21.2 pg  Mean Cell Hemoglobin Concentration : 28.0 gm/dL  Auto Neutrophil # : x  Auto Lymphocyte # : x  Auto Monocyte # : x  Auto Eosinophil # : x  Auto Basophil # : x  Auto Neutrophil % : x  Auto Lymphocyte % : x  Auto Monocyte % : x  Auto Eosinophil % : x  Auto Basophil % : x    10-05    134<L>  |  98  |  18  ----------------------------<  204<H>  4.5   |  27  |  0.83    Ca    7.8<L>      05 Oct 2020 06:22  Phos  3.5     10-03  Mg     2.0     10-03    TPro  7.3  /  Alb  2.8<L>  /  TBili  0.5  /  DBili  x   /  AST  See Note  /  ALT  See Note  /  AlkPhos  175<H>  10-03                        RADIOLOGY & ADDITIONAL STUDIES (The following images were personally reviewed):  Loja:                                     No  Urine output:                       adequate  DVT prophylaxis:                 Yes  Flattus:                                  Yes  Bowel movement:              No

## 2020-10-10 NOTE — PROGRESS NOTE ADULT - ASSESSMENT
65yMale admitted for R knee pain, drainage s/p right knee arthroscopic I+D 7/17, right knee explant and abx spacer 7/22 with Dr. Castro;  and debridement of LLE wound with wound vac placement on 7/30 by Dr. Bowen. Was original d/c'd home on 8/10/20 with PICC and vancomycin IV q12h + rifampin.  Readmitted and s/p R hip exchange of hardware on 09/04/2020; I+D of RUQ on 09/04/2020; R Knee Revision Antibiotic Spacer by Dr. Castro, R Knee medial gastroc flap and LLE STSG by Dr. Mckeon on 9/18. Discharged to [facility] on 10/3/20 and returned to [facility] ER the same day with reports of uncontrolled pain and lack of medical treatment at [facility]

## 2020-10-11 LAB
ALBUMIN SERPL ELPH-MCNC: 2.7 G/DL — LOW (ref 3.3–5)
ALP SERPL-CCNC: 205 U/L — HIGH (ref 40–120)
ALT FLD-CCNC: 13 U/L — SIGNIFICANT CHANGE UP (ref 10–45)
ANION GAP SERPL CALC-SCNC: 11 MMOL/L — SIGNIFICANT CHANGE UP (ref 5–17)
AST SERPL-CCNC: 17 U/L — SIGNIFICANT CHANGE UP (ref 10–40)
BILIRUB SERPL-MCNC: 0.6 MG/DL — SIGNIFICANT CHANGE UP (ref 0.2–1.2)
BUN SERPL-MCNC: 19 MG/DL — SIGNIFICANT CHANGE UP (ref 7–23)
CALCIUM SERPL-MCNC: 8.5 MG/DL — SIGNIFICANT CHANGE UP (ref 8.4–10.5)
CHLORIDE SERPL-SCNC: 98 MMOL/L — SIGNIFICANT CHANGE UP (ref 96–108)
CO2 SERPL-SCNC: 28 MMOL/L — SIGNIFICANT CHANGE UP (ref 22–31)
CREAT SERPL-MCNC: 0.94 MG/DL — SIGNIFICANT CHANGE UP (ref 0.5–1.3)
CRP SERPL-MCNC: 4.6 MG/DL — HIGH (ref 0–0.4)
ERYTHROCYTE [SEDIMENTATION RATE] IN BLOOD: 42 MM/HR — HIGH
GLUCOSE BLDC GLUCOMTR-MCNC: 105 MG/DL — HIGH (ref 70–99)
GLUCOSE BLDC GLUCOMTR-MCNC: 152 MG/DL — HIGH (ref 70–99)
GLUCOSE BLDC GLUCOMTR-MCNC: 161 MG/DL — HIGH (ref 70–99)
GLUCOSE BLDC GLUCOMTR-MCNC: 184 MG/DL — HIGH (ref 70–99)
GLUCOSE BLDC GLUCOMTR-MCNC: 234 MG/DL — HIGH (ref 70–99)
GLUCOSE SERPL-MCNC: 116 MG/DL — HIGH (ref 70–99)
HCT VFR BLD CALC: 31.4 % — LOW (ref 39–50)
HGB BLD-MCNC: 8.9 G/DL — LOW (ref 13–17)
MCHC RBC-ENTMCNC: 21.2 PG — LOW (ref 27–34)
MCHC RBC-ENTMCNC: 28.3 GM/DL — LOW (ref 32–36)
MCV RBC AUTO: 74.9 FL — LOW (ref 80–100)
NRBC # BLD: 0 /100 WBCS — SIGNIFICANT CHANGE UP (ref 0–0)
PLATELET # BLD AUTO: 442 K/UL — HIGH (ref 150–400)
POTASSIUM SERPL-MCNC: 4.5 MMOL/L — SIGNIFICANT CHANGE UP (ref 3.5–5.3)
POTASSIUM SERPL-SCNC: 4.5 MMOL/L — SIGNIFICANT CHANGE UP (ref 3.5–5.3)
PROT SERPL-MCNC: 7.2 G/DL — SIGNIFICANT CHANGE UP (ref 6–8.3)
RBC # BLD: 4.19 M/UL — LOW (ref 4.2–5.8)
RBC # FLD: 22.7 % — HIGH (ref 10.3–14.5)
SODIUM SERPL-SCNC: 137 MMOL/L — SIGNIFICANT CHANGE UP (ref 135–145)
WBC # BLD: 7.79 K/UL — SIGNIFICANT CHANGE UP (ref 3.8–10.5)
WBC # FLD AUTO: 7.79 K/UL — SIGNIFICANT CHANGE UP (ref 3.8–10.5)

## 2020-10-11 PROCEDURE — 99232 SBSQ HOSP IP/OBS MODERATE 35: CPT

## 2020-10-11 RX ORDER — FUROSEMIDE 40 MG
20 TABLET ORAL ONCE
Refills: 0 | Status: DISCONTINUED | OUTPATIENT
Start: 2020-10-11 | End: 2020-10-13

## 2020-10-11 RX ORDER — CHLORHEXIDINE GLUCONATE 213 G/1000ML
1 SOLUTION TOPICAL DAILY
Refills: 0 | Status: DISCONTINUED | OUTPATIENT
Start: 2020-10-11 | End: 2020-10-13

## 2020-10-11 RX ADMIN — Medication 1: at 21:40

## 2020-10-11 RX ADMIN — Medication 81 MILLIGRAM(S): at 11:38

## 2020-10-11 RX ADMIN — ATORVASTATIN CALCIUM 40 MILLIGRAM(S): 80 TABLET, FILM COATED ORAL at 21:41

## 2020-10-11 RX ADMIN — Medication 4 UNIT(S): at 17:36

## 2020-10-11 RX ADMIN — MORPHINE SULFATE 30 MILLIGRAM(S): 50 CAPSULE, EXTENDED RELEASE ORAL at 13:47

## 2020-10-11 RX ADMIN — PRASUGREL 10 MILLIGRAM(S): 5 TABLET, FILM COATED ORAL at 12:16

## 2020-10-11 RX ADMIN — GABAPENTIN 400 MILLIGRAM(S): 400 CAPSULE ORAL at 21:41

## 2020-10-11 RX ADMIN — MORPHINE SULFATE 15 MILLIGRAM(S): 50 CAPSULE, EXTENDED RELEASE ORAL at 12:07

## 2020-10-11 RX ADMIN — DULOXETINE HYDROCHLORIDE 30 MILLIGRAM(S): 30 CAPSULE, DELAYED RELEASE ORAL at 11:38

## 2020-10-11 RX ADMIN — Medication 166.67 MILLIGRAM(S): at 21:41

## 2020-10-11 RX ADMIN — Medication 300 UNIT(S): at 11:52

## 2020-10-11 RX ADMIN — GABAPENTIN 400 MILLIGRAM(S): 400 CAPSULE ORAL at 05:29

## 2020-10-11 RX ADMIN — Medication 0.12 MILLIGRAM(S): at 05:29

## 2020-10-11 RX ADMIN — GABAPENTIN 400 MILLIGRAM(S): 400 CAPSULE ORAL at 12:17

## 2020-10-11 RX ADMIN — INSULIN GLARGINE 10 UNIT(S): 100 INJECTION, SOLUTION SUBCUTANEOUS at 21:41

## 2020-10-11 RX ADMIN — Medication 1: at 13:54

## 2020-10-11 RX ADMIN — MORPHINE SULFATE 30 MILLIGRAM(S): 50 CAPSULE, EXTENDED RELEASE ORAL at 05:29

## 2020-10-11 RX ADMIN — MORPHINE SULFATE 30 MILLIGRAM(S): 50 CAPSULE, EXTENDED RELEASE ORAL at 21:41

## 2020-10-11 RX ADMIN — MORPHINE SULFATE 30 MILLIGRAM(S): 50 CAPSULE, EXTENDED RELEASE ORAL at 06:00

## 2020-10-11 RX ADMIN — POLYETHYLENE GLYCOL 3350 17 GRAM(S): 17 POWDER, FOR SOLUTION ORAL at 12:16

## 2020-10-11 RX ADMIN — SERTRALINE 25 MILLIGRAM(S): 25 TABLET, FILM COATED ORAL at 12:21

## 2020-10-11 RX ADMIN — Medication 4 UNIT(S): at 09:27

## 2020-10-11 RX ADMIN — MORPHINE SULFATE 30 MILLIGRAM(S): 50 CAPSULE, EXTENDED RELEASE ORAL at 22:10

## 2020-10-11 RX ADMIN — CHLORHEXIDINE GLUCONATE 1 APPLICATION(S): 213 SOLUTION TOPICAL at 11:51

## 2020-10-11 RX ADMIN — MORPHINE SULFATE 15 MILLIGRAM(S): 50 CAPSULE, EXTENDED RELEASE ORAL at 11:38

## 2020-10-11 RX ADMIN — Medication 1 APPLICATION(S): at 21:18

## 2020-10-11 RX ADMIN — Medication 2: at 17:35

## 2020-10-11 RX ADMIN — Medication 20 MILLIGRAM(S): at 05:29

## 2020-10-11 RX ADMIN — SPIRONOLACTONE 25 MILLIGRAM(S): 25 TABLET, FILM COATED ORAL at 05:29

## 2020-10-11 RX ADMIN — Medication 4 UNIT(S): at 13:54

## 2020-10-11 NOTE — PROGRESS NOTE ADULT - SUBJECTIVE AND OBJECTIVE BOX
Ortho Note    Pt in good spirits this morning, says pain is finally under control. Had SOB yesterday evening which resolved with IV lasix  Denies CP, SOB, N/V, numbness/tingling down le b/l.     Vital Signs Last 24 Hrs  T(C): 36.3 (11 Oct 2020 05:18), Max: 37.2 (10 Oct 2020 21:02)  T(F): 97.3 (11 Oct 2020 05:18), Max: 98.9 (10 Oct 2020 21:02)  HR: 80 (11 Oct 2020 05:18) (77 - 86)  BP: 99/62 (11 Oct 2020 05:18) (99/62 - 117/73)  BP(mean): 81 (10 Oct 2020 22:43) (80 - 88)  RR: 17 (11 Oct 2020 05:18) (17 - 18)  SpO2: 98% (11 Oct 2020 05:18) (95% - 98%)    General: Pt Alert and oriented, NAD  DSG ace wrap C/D/I  Pulses:  DPs palpable b/l le   Sensation:  SILT throughout distal le b/l   Motor: EHL/FHL/TA/GS 5/5 b/l le   calves soft compressible NTTP               A/P: 65yMale admitted for R knee pain, drainage s/p right knee arthroscopic I+D 7/17, right knee explant and abx spacer 7/22 with Dr. Castro;  and debridement of LLE wound with wound vac placement on 7/30 by Dr. Bowen. Was original d/c'd home on 8/10/20 with PICC and vancomycin IV q12h + rifampin.  Readmitted and s/p R hip exchange of hardware on 09/04/2020; I+D of RUQ on 09/04/2020; R Knee Revision Antibiotic Spacer by Dr. Castro, R Knee medial gastroc flap and LLE STSG by Dr. Mckeon on 9/18. Discharged to [facility] on 10/3/20 and returned to [facility] ER the same day with reports of uncontrolled pain and lack of medical treatment at [facility]  - AM labs EVERY OTHER DAY plan to f/u with ID Dr. Casillas outpatient; will consult with any change in patient status or lab results  - medical and cardiology co-management; no current need for telemetry monitoring as per recs  - 20mg IV pushes of lasix for acute SOB/Dyspnea  - Pain Control- appreciate pain management recs  - DVT ppx: ASA + effient (home regimen)  - PT, WBS: TTWB RLE in seamus brace; WBAT LLE  - dressing changes as per plastics recs (Dr. Calvillo) on 10/2 prior to previous d/c- bacitracin, adaptiq, abd pads to LLE and RLE skin graft sites; RLE to be wrapped loosely in ace wrap  - podiatry debridements from prior admission well-appearing- will reconsult as needed; follow-up outpatient  - RUQ drain site not actively draining at this time- plan to follow-up with Dr. Latif regarding elective cholecystectomy in the future  - OOB for meals as tolerated, aggressive I/S  - bowel regimen  - dispo: PINKY salgado    Ortho Pager [phone]

## 2020-10-11 NOTE — PROGRESS NOTE ADULT - ASSESSMENT
65yMale admitted for R knee pain, drainage s/p right knee arthroscopic I+D 7/17, right knee explant and abx spacer 7/22 with Dr. Castro;  and debridement of LLE wound with wound vac placement on 7/30 by Dr. Bowen. Was original d/c'd home on 8/10/20 with PICC and vancomycin IV q12h + rifampin.  Readmitted and s/p R hip exchange of hardware on 09/04/2020; I+D of RUQ on 09/04/2020; R Knee Revision Antibiotic Spacer by Dr. Castro, R Knee medial gastroc flap and LLE STSG by Dr. Mckeon on 9/18. Discharged to [facility] on 10/3/20 and returned to [facility] ER the same day with reports of uncontrolled pain and lack of medical treatment at [facility]

## 2020-10-11 NOTE — CHART NOTE - TREATMENT: THE FOLLOWING DIET HAS BEEN RECOMMENDED
Per physical assessment: Muscle Wasting- Temporal [x   ]  Clavicle/Pectoral [  X ]  Shoulder/Deltoid [   ]  Scapula [   ]  Interosseous [   ]  Quadriceps [   ]  Gastrocnemius [   ]; Fat Wasting- Orbital [  X ]  Buccal [   ]  Triceps [ X  ]  Rib [   ]--> patient meets criteria for moderate protein-calorie malnutrition 2/2 to physical assessment    Continue with Consistent Carbohydrate Diet with Glucerna Shakes BID (440 kcal, 20g protein, 400mL H2O)  Reinforce diet education  Pain and bowel regimens per team

## 2020-10-11 NOTE — PROGRESS NOTE ADULT - SUBJECTIVE AND OBJECTIVE BOX
Interval Events: Reviewed  Patient seen and examined at bedside.    Patient is a 65y old  Male who presents with a chief complaint of knee pain (10 Oct 2020 07:03)  no acute event overnight, pain controlled      PAST MEDICAL & SURGICAL HISTORY:  Diabetic neuropathy    STEMI (ST elevation myocardial infarction)    Diverticulitis    MRSA bacteremia    History of celiac disease    CHF (congestive heart failure)  EF ~ 25%    HTN (hypertension)    Diabetes  on insulin pump    Blood clot due to device, implant, or graft  was on blood thinners    HLD (hyperlipidemia)    Osteoarthritis    Atherosclerosis of coronary artery  CAD (coronary artery disease)    Status post percutaneous transluminal coronary angioplasty  in 2012    History of open reduction and internal fixation (ORIF) procedure    Surgery, elective  Right shoulder    Surgery, elective  right knee wound debridement    S/P TKR (total knee replacement), right  with infection Mrsa   per pt he was cleared from MRSA infection    S/P CABG x 1  2018    Stented coronary artery  10/18 heart attack  INFERIOR WALL MI    Other postprocedural status  Fixation hardware in foot LEFT        MEDICATIONS:  Pulmonary:    Antimicrobials:  rifAMPin 300 milliGRAM(s) Oral every 12 hours  vancomycin  IVPB 1250 milliGRAM(s) IV Intermittent every 24 hours    Anticoagulants:  heparin  Lock Flush 100 Units/mL Injectable 300 Unit(s) IV Push daily  prasugrel 10 milliGRAM(s) Oral daily    Cardiac:  digoxin     Tablet 0.125 milliGRAM(s) Oral daily  furosemide    Tablet 20 milliGRAM(s) Oral daily  metoprolol succinate ER 25 milliGRAM(s) Oral daily  spironolactone 25 milliGRAM(s) Oral daily  tamsulosin 0.4 milliGRAM(s) Oral at bedtime      Allergies    ACE inhibitors (Hives)  carvedilol (Other)  enalapril (Hives)  Entresto (Other)    Intolerances        Vital Signs Last 24 Hrs  T(C): 36.3 (11 Oct 2020 05:18), Max: 37.2 (10 Oct 2020 21:02)  T(F): 97.3 (11 Oct 2020 05:18), Max: 98.9 (10 Oct 2020 21:02)  HR: 80 (11 Oct 2020 05:18) (77 - 86)  BP: 99/62 (11 Oct 2020 05:18) (99/62 - 117/73)  BP(mean): 81 (10 Oct 2020 22:43) (80 - 88)  RR: 17 (11 Oct 2020 05:18) (17 - 18)  SpO2: 98% (11 Oct 2020 05:18) (95% - 98%)    10-09 @ 07:01  -  10-10 @ 07:00  --------------------------------------------------------  IN: 1370 mL / OUT: 1751 mL / NET: -381 mL    10-10 @ 07:01  -  10-11 @ 06:39  --------------------------------------------------------  IN: 1180 mL / OUT: 2925 mL / NET: -1745 mL          Review of Systems:   •	General: negative  •	Skin/Breast: negative  •	Ophthalmologic: negative  •	ENMT: negative  •	Respiratory and Thorax: negative  •	Cardiovascular: negative  •	Gastrointestinal: negative  •	Genitourinary: negative  •	Musculoskeletal: negative  •	Neurological: negative  •	Psychiatric: negative  •	Hematology/Lymphatics: negative  •	Endocrine: negative  •	Allergic/Immunologic: negative    Physical Exam:   • Constitutional:	Well-developed, well nourished  • Eyes:	EOMI; PERRL; no drainage or redness  • ENMT:	No oral lesions; no gross abnormalities  • Neck	no thyromegaly or nodules  • Breasts:	not examined  • Back:	No deformity or limitation of movement  • Respiratory:	Breath Sounds equal & clear to  auscultation, no accessory muscle use  • Cardiovascular:	Regular rate & rhythm, normal S1, S2; no murmurs, gallops or rubs; no S3, S4  • Gastrointestinal:	Soft, non-tender, no hepatosplenomegaly, normal bowel sounds  • Genitourinary:	not examined  • Rectal: not examined  • Extremities:	No cyanosis, clubbing or edema, right leg in knee immobilizer, left leg, dressing intact, donor site is clean, dry  • Vascular:	Equal and normal pulses (dorsalis pedis)  • Neurologica:l	not examined  • Skin:	No lesions; no rash  • Lymph Nodes:	No lymphadedenopathy  • Musculoskeletal:	No joint pain, swelling or deformity; no limitation of movement        LABS:                                  RADIOLOGY & ADDITIONAL STUDIES (The following images were personally reviewed):  Loja:                                     No  Urine output:                       adequate  DVT prophylaxis:                 Yes  Flattus:                                  Yes  Bowel movement:              No

## 2020-10-12 LAB
GLUCOSE BLDC GLUCOMTR-MCNC: 142 MG/DL — HIGH (ref 70–99)
GLUCOSE BLDC GLUCOMTR-MCNC: 157 MG/DL — HIGH (ref 70–99)
GLUCOSE BLDC GLUCOMTR-MCNC: 160 MG/DL — HIGH (ref 70–99)
GLUCOSE BLDC GLUCOMTR-MCNC: 163 MG/DL — HIGH (ref 70–99)

## 2020-10-12 PROCEDURE — 99232 SBSQ HOSP IP/OBS MODERATE 35: CPT

## 2020-10-12 PROCEDURE — 99232 SBSQ HOSP IP/OBS MODERATE 35: CPT | Mod: GC

## 2020-10-12 RX ORDER — BACITRACIN ZINC 500 UNIT/G
1 OINTMENT IN PACKET (EA) TOPICAL
Qty: 0 | Refills: 0 | DISCHARGE
Start: 2020-10-12

## 2020-10-12 RX ORDER — ACETAMINOPHEN 500 MG
2 TABLET ORAL
Qty: 0 | Refills: 0 | DISCHARGE
Start: 2020-10-12

## 2020-10-12 RX ADMIN — MORPHINE SULFATE 30 MILLIGRAM(S): 50 CAPSULE, EXTENDED RELEASE ORAL at 22:40

## 2020-10-12 RX ADMIN — Medication 1 APPLICATION(S): at 22:09

## 2020-10-12 RX ADMIN — Medication 4 UNIT(S): at 08:41

## 2020-10-12 RX ADMIN — Medication 1 APPLICATION(S): at 21:43

## 2020-10-12 RX ADMIN — Medication 4 UNIT(S): at 12:51

## 2020-10-12 RX ADMIN — DULOXETINE HYDROCHLORIDE 30 MILLIGRAM(S): 30 CAPSULE, DELAYED RELEASE ORAL at 11:45

## 2020-10-12 RX ADMIN — Medication 1: at 08:41

## 2020-10-12 RX ADMIN — GABAPENTIN 400 MILLIGRAM(S): 400 CAPSULE ORAL at 05:45

## 2020-10-12 RX ADMIN — MORPHINE SULFATE 30 MILLIGRAM(S): 50 CAPSULE, EXTENDED RELEASE ORAL at 05:45

## 2020-10-12 RX ADMIN — Medication 4 UNIT(S): at 17:59

## 2020-10-12 RX ADMIN — Medication 166.67 MILLIGRAM(S): at 22:08

## 2020-10-12 RX ADMIN — SERTRALINE 25 MILLIGRAM(S): 25 TABLET, FILM COATED ORAL at 12:12

## 2020-10-12 RX ADMIN — MORPHINE SULFATE 30 MILLIGRAM(S): 50 CAPSULE, EXTENDED RELEASE ORAL at 14:09

## 2020-10-12 RX ADMIN — MORPHINE SULFATE 15 MILLIGRAM(S): 50 CAPSULE, EXTENDED RELEASE ORAL at 00:22

## 2020-10-12 RX ADMIN — Medication 81 MILLIGRAM(S): at 11:45

## 2020-10-12 RX ADMIN — SPIRONOLACTONE 25 MILLIGRAM(S): 25 TABLET, FILM COATED ORAL at 05:45

## 2020-10-12 RX ADMIN — PRASUGREL 10 MILLIGRAM(S): 5 TABLET, FILM COATED ORAL at 11:45

## 2020-10-12 RX ADMIN — Medication 25 MILLIGRAM(S): at 05:45

## 2020-10-12 RX ADMIN — Medication 1: at 17:59

## 2020-10-12 RX ADMIN — Medication 1: at 21:44

## 2020-10-12 RX ADMIN — ONDANSETRON 4 MILLIGRAM(S): 8 TABLET, FILM COATED ORAL at 17:59

## 2020-10-12 RX ADMIN — ATORVASTATIN CALCIUM 40 MILLIGRAM(S): 80 TABLET, FILM COATED ORAL at 22:08

## 2020-10-12 RX ADMIN — MORPHINE SULFATE 30 MILLIGRAM(S): 50 CAPSULE, EXTENDED RELEASE ORAL at 06:15

## 2020-10-12 RX ADMIN — CHLORHEXIDINE GLUCONATE 1 APPLICATION(S): 213 SOLUTION TOPICAL at 11:44

## 2020-10-12 RX ADMIN — Medication 20 MILLIGRAM(S): at 05:45

## 2020-10-12 RX ADMIN — Medication 300 UNIT(S): at 11:45

## 2020-10-12 RX ADMIN — GABAPENTIN 400 MILLIGRAM(S): 400 CAPSULE ORAL at 14:09

## 2020-10-12 RX ADMIN — INSULIN GLARGINE 10 UNIT(S): 100 INJECTION, SOLUTION SUBCUTANEOUS at 22:08

## 2020-10-12 RX ADMIN — MORPHINE SULFATE 30 MILLIGRAM(S): 50 CAPSULE, EXTENDED RELEASE ORAL at 22:09

## 2020-10-12 RX ADMIN — Medication 0.12 MILLIGRAM(S): at 05:45

## 2020-10-12 RX ADMIN — MORPHINE SULFATE 30 MILLIGRAM(S): 50 CAPSULE, EXTENDED RELEASE ORAL at 15:00

## 2020-10-12 RX ADMIN — MORPHINE SULFATE 15 MILLIGRAM(S): 50 CAPSULE, EXTENDED RELEASE ORAL at 11:45

## 2020-10-12 RX ADMIN — GABAPENTIN 400 MILLIGRAM(S): 400 CAPSULE ORAL at 22:08

## 2020-10-12 RX ADMIN — MORPHINE SULFATE 15 MILLIGRAM(S): 50 CAPSULE, EXTENDED RELEASE ORAL at 12:30

## 2020-10-12 RX ADMIN — MORPHINE SULFATE 15 MILLIGRAM(S): 50 CAPSULE, EXTENDED RELEASE ORAL at 00:52

## 2020-10-12 NOTE — PROGRESS NOTE ADULT - ASSESSMENT
64 y/o M s/p muscle flap closure of R knee and left leg skin graft. Wound healing as expected, low concern for SSI.    c/w daily dressing changes   Pain control  Dispo per primary team  Plastics will continue to follow

## 2020-10-12 NOTE — PROGRESS NOTE ADULT - SUBJECTIVE AND OBJECTIVE BOX
Interval Events: Reviewed  Patient seen and examined at bedside.    Patient is a 65y old  Male who presents with a chief complaint of dyspnea (12 Oct 2020 08:40)    he wants regular diet   PAST MEDICAL & SURGICAL HISTORY:  Diabetic neuropathy    STEMI (ST elevation myocardial infarction)    Diverticulitis    MRSA bacteremia    History of celiac disease    CHF (congestive heart failure)  EF ~ 25%    HTN (hypertension)    Diabetes  on insulin pump    Blood clot due to device, implant, or graft  was on blood thinners    HLD (hyperlipidemia)    Osteoarthritis    Atherosclerosis of coronary artery  CAD (coronary artery disease)    Status post percutaneous transluminal coronary angioplasty  in 2012    History of open reduction and internal fixation (ORIF) procedure    Surgery, elective  Right shoulder    Surgery, elective  right knee wound debridement    S/P TKR (total knee replacement), right  with infection Mrsa   per pt he was cleared from MRSA infection    S/P CABG x 1  2018    Stented coronary artery  10/18 heart attack  INFERIOR WALL MI    Other postprocedural status  Fixation hardware in foot LEFT        MEDICATIONS:  Pulmonary:    Antimicrobials:  rifAMPin 300 milliGRAM(s) Oral every 12 hours  vancomycin  IVPB 1250 milliGRAM(s) IV Intermittent every 24 hours    Anticoagulants:  heparin  Lock Flush 100 Units/mL Injectable 300 Unit(s) IV Push daily  prasugrel 10 milliGRAM(s) Oral daily    Cardiac:  digoxin     Tablet 0.125 milliGRAM(s) Oral daily  furosemide    Tablet 20 milliGRAM(s) Oral daily  furosemide   Injectable 20 milliGRAM(s) IV Push once  metoprolol succinate ER 25 milliGRAM(s) Oral daily  spironolactone 25 milliGRAM(s) Oral daily  tamsulosin 0.4 milliGRAM(s) Oral at bedtime      Allergies    ACE inhibitors (Hives)  carvedilol (Other)  enalapril (Hives)  Entresto (Other)    Intolerances        Vital Signs Last 24 Hrs  T(C): 37 (12 Oct 2020 16:47), Max: 37 (12 Oct 2020 16:47)  T(F): 98.6 (12 Oct 2020 16:47), Max: 98.6 (12 Oct 2020 16:47)  HR: 84 (12 Oct 2020 16:47) (79 - 89)  BP: 115/66 (12 Oct 2020 16:47) (110/65 - 115/66)  BP(mean): --  RR: 17 (12 Oct 2020 16:47) (17 - 18)  SpO2: 97% (12 Oct 2020 16:47) (94% - 98%)    10-11 @ 07:01  -  10-12 @ 07:00  --------------------------------------------------------  IN: 840 mL / OUT: 1630 mL / NET: -790 mL    10-12 @ 07:01  -  10-12 @ 22:25  --------------------------------------------------------  IN: 510 mL / OUT: 500 mL / NET: 10 mL          Review of Systems:   •	General: negative  •	Skin/Breast: negative  •	Ophthalmologic: negative  •	ENMT: negative  •	Respiratory and Thorax: negative  •	Cardiovascular: negative  •	Gastrointestinal: negative  •	Genitourinary: negative  •	Musculoskeletal: negative  •	Neurological: negative  •	Psychiatric: negative  •	Hematology/Lymphatics: negative  •	Endocrine: negative  •	Allergic/Immunologic: negative    Physical Exam:   • Constitutional:	Well-developed, well nourished  • Eyes:	EOMI; PERRL; no drainage or redness  • ENMT:	No oral lesions; no gross abnormalities  • Neck	No bruits; no thyromegaly or nodules  • Breasts:	not examined  • Back:	No deformity or limitation of movement  • Respiratory:	Breath Sounds equal & clear to percussion & auscultation, no accessory muscle use  • Cardiovascular:	Regular rate & rhythm, normal S1, S2; no murmurs, gallops or rubs; no S3, S4  • Gastrointestinal:	Soft, non-tender, no hepatosplenomegaly, normal bowel sounds  • Genitourinary:	not examined  • Rectal: not examined  • Extremities:	No cyanosis, clubbing or edema  • Vascular:	Equal and normal pulses (carotid, femoral, dorsalis pedis)  • Neurologica:l	not examined  • Skin:	No lesions; no rash  • Lymph Nodes:	No lymphadedenopathy  • Musculoskeletal:	No joint pain, swelling or deformity; no limitation of movement        LABS:      CBC Full  -  ( 11 Oct 2020 06:48 )  WBC Count : 7.79 K/uL  RBC Count : 4.19 M/uL  Hemoglobin : 8.9 g/dL  Hematocrit : 31.4 %  Platelet Count - Automated : 442 K/uL  Mean Cell Volume : 74.9 fl  Mean Cell Hemoglobin : 21.2 pg  Mean Cell Hemoglobin Concentration : 28.3 gm/dL  Auto Neutrophil # : x  Auto Lymphocyte # : x  Auto Monocyte # : x  Auto Eosinophil # : x  Auto Basophil # : x  Auto Neutrophil % : x  Auto Lymphocyte % : x  Auto Monocyte % : x  Auto Eosinophil % : x  Auto Basophil % : x    10-11    137  |  98  |  19  ----------------------------<  116<H>  4.5   |  28  |  0.94    Ca    8.5      11 Oct 2020 06:48    TPro  7.2  /  Alb  2.7<L>  /  TBili  0.6  /  DBili  x   /  AST  17  /  ALT  13  /  AlkPhos  205<H>  10-11                        RADIOLOGY & ADDITIONAL STUDIES (The following images were personally reviewed):  Loja:                                     No  Urine output:                       adequate  DVT prophylaxis:                 Yes  Flattus:                                  Yes  Bowel movement:              No

## 2020-10-12 NOTE — PROGRESS NOTE ADULT - SUBJECTIVE AND OBJECTIVE BOX
S: Examined at the bedside by team. States I am concerned about my wound and it's healing. Pain of the LE well controlled on pain medication. No numbness or tingling. Denies nausea, emesis, fevers, chills. No acute complaints.     O:  Vital Signs Last 24 Hrs  T(C): 36.2 (12 Oct 2020 05:28), Max: 37.3 (11 Oct 2020 12:53)  T(F): 97.2 (12 Oct 2020 05:28), Max: 99.1 (11 Oct 2020 12:53)  HR: 89 (12 Oct 2020 05:28) (81 - 101)  BP: 110/66 (12 Oct 2020 05:28) (109/69 - 115/73)  BP(mean): --  RR: 18 (12 Oct 2020 05:28) (15 - 18)  SpO2: 94% (12 Oct 2020 05:28) (93% - 100%)  I&O's Detail    11 Oct 2020 07:01  -  12 Oct 2020 07:00  --------------------------------------------------------  IN:    Oral Fluid: 840 mL  Total IN: 840 mL    OUT:    Voided (mL): 1630 mL  Total OUT: 1630 mL    Total NET: -790 mL    Physical Exam  General: NAD, resting comfortably in bed  C/V: NSR  Pulm: Nonlabored breathing, no respiratory distress  Abd: soft, non-tender, non-distended.  Extrem: WWP, no edema, R knee w/ brace in place, operative site w/ good granulation, no overlying erythema or drainage  LLE: Graft site w/ good granulation, no erythema or purulent drainage   Neuro: A/O x 3, CNs II-XII grossly intact, no focal deficits, normal sensation      LABS:                        8.9    7.79  )-----------( 442      ( 11 Oct 2020 06:48 )             31.4     10-11    137  |  98  |  19  ----------------------------<  116<H>  4.5   |  28  |  0.94    Ca    8.5      11 Oct 2020 06:48    TPro  7.2  /  Alb  2.7<L>  /  TBili  0.6  /  DBili  x   /  AST  17  /  ALT  13  /  AlkPhos  205<H>  10-11          RADIOLOGY & ADDITIONAL STUDIES:  < from: Xray Femur 2 Views, Right (10.06.20 @ 13:10) >  FINDINGS: Five views right femur and four views right knee. Again post Gamma nail fixation of right proximal femoral fracture with extensive callus formation and heterotopic ossification. No change in appearance of cement spacer right knee with metallic rods extending into femur and tibia. No acute fracture or dislocation. Knee brace present. Soft tissue swelling around the knee has improved. Drain removed. Vascular calcifications.    IMPRESSION: 1. Since 9/18/2020, there has been improvement in soft tissue swelling around the knee. Otherwise similar appearance of spacer device.    2. No significant change in postoperative appearance of right hip.    < end of copied text >

## 2020-10-12 NOTE — PROGRESS NOTE ADULT - SUBJECTIVE AND OBJECTIVE BOX
INTERVAL HISTORY:  Dyspnea over the weekend, was sleeping sitting up. Now improved. 	  Chart, [facility] medications and allergies reviewed    MEDICATIONS:  MEDICATIONS  (STANDING):  aspirin 81 milliGRAM(s) Oral daily  atorvastatin 40 milliGRAM(s) Oral at bedtime  BACItracin   Ointment 1 Application(s) Topical daily  BACItracin   Ointment 1 Application(s) Topical daily  chlorhexidine 2% Cloths 1 Application(s) Topical daily  dextrose 5%. 1000 milliLiter(s) (50 mL/Hr) IV Continuous <Continuous>  dextrose 50% Injectable 12.5 Gram(s) IV Push once  dextrose 50% Injectable 25 Gram(s) IV Push once  dextrose 50% Injectable 25 Gram(s) IV Push once  digoxin     Tablet 0.125 milliGRAM(s) Oral daily  DULoxetine 30 milliGRAM(s) Oral daily  furosemide    Tablet 20 milliGRAM(s) Oral daily  furosemide   Injectable 20 milliGRAM(s) IV Push once  gabapentin 400 milliGRAM(s) Oral three times a day  heparin  Lock Flush 100 Units/mL Injectable 300 Unit(s) IV Push daily  insulin glargine Injectable (LANTUS) 10 Unit(s) SubCutaneous at bedtime  insulin lispro (HumaLOG) corrective regimen sliding scale   SubCutaneous Before meals and at bedtime  insulin lispro Injectable (HumaLOG) 4 Unit(s) SubCutaneous three times a day before meals  metoprolol succinate ER 25 milliGRAM(s) Oral daily  morphine ER Tablet 30 milliGRAM(s) Oral three times a day  polyethylene glycol 3350 17 Gram(s) Oral daily  prasugrel 10 milliGRAM(s) Oral daily  rifAMPin 300 milliGRAM(s) Oral every 12 hours  sertraline 25 milliGRAM(s) Oral daily  spironolactone 25 milliGRAM(s) Oral daily  tamsulosin 0.4 milliGRAM(s) Oral at bedtime  vancomycin  IVPB 1250 milliGRAM(s) IV Intermittent every 24 hours      REVIEW OF SYSTEMS:  CONSTITUTIONAL: No fever, no weight loss, no fatigue  PULMONARY: No cough, no wheezing, chills no hemoptysis; + Shortness of Breath  CARDIOVASCULAR: No chest pain, no palpitations, no syncope, dizziness, no leg swelling  GASTROINTESTINAL: No abdominal pain. No nausea, no  vomiting, no hematemesis; No diarrhea, no constipation. No melena, no hematochezia.  GENITOURINARY: No dysuria, no frequency, no hematuria, no incontinence  SKIN: No itching, no burning, no rashes, no lesions     PHYSICAL EXAM:  T(C): 36.2 (10-12-20 @ 05:28), Max: 37.3 (10-11-20 @ 12:53)  HR: 89 (10-12-20 @ 05:28) (81 - 101)  BP: 110/66 (10-12-20 @ 05:28) (110/66 - 115/73)  RR: 18 (10-12-20 @ 05:28) (15 - 18)  SpO2: 94% (10-12-20 @ 05:28) (93% - 95%)  Wt(kg): --  I&O's Summary    11 Oct 2020 07:01  -  12 Oct 2020 07:00  --------------------------------------------------------  IN: 840 mL / OUT: 1630 mL / NET: -790 mL        Appearance:  No deformities, normal appearance	  HEENT:   Normal oral mucosa, PERRL, EOMI	  Neck: No jvd , no masses  Lymphatic: No  lymphadenopathy  Pulmonary: Lungs clear to auscultation and percussion  Cardiovascular: Normal S1 S2, No JVD, No murmur, tr edema	  Abdomen:  Soft, Non-tender, + BS  Skin: No rashes, No ecchymoses	  Extremities:  No clubbing or cyanosis  MSK: Normal range of motion, no joint swelling    LABS:		  CARDIAC MARKERS:                         8.9    7.79  )-----------( 442      ( 11 Oct 2020 06:48 )             31.4   10-11    137  |  98  |  19  ----------------------------<  116<H>  4.5   |  28  |  0.94    Ca    8.5      11 Oct 2020 06:48    TPro  7.2  /  Alb  2.7<L>  /  TBili  0.6  /  DBili  x   /  AST  17  /  ALT  13  /  AlkPhos  205<H>  10-11    proBNP:  Lipid Profile:  HgA1c:  TSH:      TELEMETRY: 	      QTC     ECG:  	   QTC         RADIOLOGY:   DIAGNOSTIC TESTING: [ ] Echocardiogram: [ ]  Catheterization: [ ] Stress Test:      ASSESSMENT/PLAN: 	  Severely reduced ejection fraction-Dyspnea is improved after IV furosemide.   Mild edema. Continue metoprolol digoxin furosemide and spironolactone.    Coronary artery disease-the patient denies chest discomfort.  Continue prasugrel and aspirin.     Ventricular tachycardia/Defibrillator- asymptomatic.  Off monitor.     Hyponatremia-normal sodium now.    Septic knee- as per ID and orthopedics.         Discussed with house staff.

## 2020-10-12 NOTE — PROGRESS NOTE ADULT - ATTENDING COMMENTS
Patient seen and examined with house-staff during bedside rounds.  Resident note read, including vitals, physical findings, laboratory data, and radiological reports.   Revisions included below.  Direct personal management at bed side and extensive interpretation of the data.  Plan was outlined and discussed in details with the housestaff.  Decision making of high complexity  Action taken for acute disease activity to reflect the level of care provided:  - medication reconciliation  - review laboratory data  will recommend changing the diet and he is aware to control his intake

## 2020-10-12 NOTE — PROGRESS NOTE ADULT - SUBJECTIVE AND OBJECTIVE BOX
Orthopaedic Surgery Progress Note    Patient seen and examined. ANAHI. Patient without acute complaints. Pain controlled. Denies CP, SOB, N/V, tactile fevers, calf pain.  Pt is s/p right knee revision antibiotic spacer + Right gastroc flap, left STSG (managed by plastic surgery)  Pt is on Vancomycin and Rifampin per ID recs.       Vital Signs Last 24 Hrs  T(C): 36.5 (12 Oct 2020 08:30), Max: 37.3 (11 Oct 2020 12:53)  T(F): 97.7 (12 Oct 2020 08:30), Max: 99.1 (11 Oct 2020 12:53)  HR: 80 (12 Oct 2020 08:30) (80 - 101)  BP: 115/53 (12 Oct 2020 08:30) (110/66 - 115/73)  BP(mean): --  RR: 18 (12 Oct 2020 08:30) (15 - 18)  SpO2: 98% (12 Oct 2020 08:30) (93% - 98%)         CAPILLARY BLOOD GLUCOSE      POCT Blood Glucose.: 160 mg/dL (12 Oct 2020 08:34)  POCT Blood Glucose.: 184 mg/dL (11 Oct 2020 21:29)  POCT Blood Glucose.: 234 mg/dL (11 Oct 2020 17:12)  POCT Blood Glucose.: 161 mg/dL (11 Oct 2020 13:49)  POCT Blood Glucose.: 152 mg/dL (11 Oct 2020 12:36)                  Physical Exam:  Pt laying comfortably in bed, NAD.  Skin warm and well perfused, no visible erythema/ecchymoses.  Dressing C/D/I - right lower extremity ACE with seamus   Left lower extremity graft site dressings CDI - managed  by plastic surgery team   Calves soft and nontender bilaterally   EHL/FHL/TA/GS firing bilateral lower extremities   SLT in tact to distal bilateral lower extremities   DP pulses 2+     LABS                        8.9    7.79  )-----------( 442      ( 11 Oct 2020 06:48 )             31.4                                10-11    137  |  98  |  19  ----------------------------<  116<H>  4.5   |  28  |  0.94    Ca    8.5      11 Oct 2020 06:48    TPro  7.2  /  Alb  2.7<L>  /  TBili  0.6  /  DBili  x   /  AST  17  /  ALT  13  /  AlkPhos  205<H>  10-11        A/P: 65M s/p right knee revision antibiotic spacer + Right gastroc flap, left STSG (managed by plastic surgery)    CONTINUE:        1. PT: TTWB RLE in seamus brace; WBAT LLE  2. DVT prophylaxis: SCDs, Effient   3. Pain Control as needed   4. Dispo: [facility]

## 2020-10-12 NOTE — PROGRESS NOTE ADULT - SUBJECTIVE AND OBJECTIVE BOX
Ortho Note    Pt tearful this morning, did not want to interact much. Repeats that he does not want to be "kicked out" of the hospital. Pain controlled.  Denies CP, SOB, N/V, numbness/tingling down le b/l.     Vital Signs Last 24 Hrs  T(C): 36.2 (12 Oct 2020 05:28), Max: 37.3 (11 Oct 2020 12:53)  T(F): 97.2 (12 Oct 2020 05:28), Max: 99.1 (11 Oct 2020 12:53)  HR: 89 (12 Oct 2020 05:28) (81 - 101)  BP: 110/66 (12 Oct 2020 05:28) (109/69 - 115/73)  BP(mean): --  RR: 18 (12 Oct 2020 05:28) (15 - 18)  SpO2: 94% (12 Oct 2020 05:28) (93% - 100%)    General: Pt Alert and oriented, NAD  DSG ace wrap C/D/I  Pulses:  DPs palpable b/l le   Sensation:  SILT throughout distal le b/l   Motor: EHL/FHL/TA/GS 5/5 b/l le   calves soft compressible NTTP               A/P: 65yMale admitted for R knee pain, drainage s/p right knee arthroscopic I+D 7/17, right knee explant and abx spacer 7/22 with Dr. Castro;  and debridement of LLE wound with wound vac placement on 7/30 by Dr. Bowen. Was original d/c'd home on 8/10/20 with PICC and vancomycin IV q12h + rifampin.  Readmitted and s/p R hip exchange of hardware on 09/04/2020; I+D of RUQ on 09/04/2020; R Knee Revision Antibiotic Spacer by Dr. Castro, R Knee medial gastroc flap and LLE STSG by Dr. Mckeon on 9/18. Discharged to [facility] on 10/3/20 and returned to [facility] ER the same day with reports of uncontrolled pain and lack of medical treatment at [facility]  - AM labs EVERY OTHER DAY plan to f/u with ID Dr. Casillas outpatient; will consult with any change in patient status or lab results  - medical and cardiology co-management; no current need for telemetry monitoring as per recs  - 20mg IV pushes of lasix for acute SOB/Dyspnea  - Pain Control- appreciate pain management recs  - DVT ppx: ASA + effient (home regimen)  - PT, WBS: TTWB RLE in seamus brace; WBAT LLE  - dressing changes as per plastics recs (Dr. Calvillo) on 10/2 prior to previous d/c- bacitracin, adaptiq, abd pads to LLE and RLE skin graft sites; RLE to be wrapped loosely in ace wrap  - podiatry debridements from prior admission well-appearing- will reconsult as needed; follow-up outpatient  - RUQ drain site not actively draining at this time- plan to follow-up with Dr. Latif regarding elective cholecystectomy in the future  - OOB for meals as tolerated, aggressive I/S  - bowel regimen  - dispo: [facility]    Ortho Pager [phone]

## 2020-10-12 NOTE — PROGRESS NOTE ADULT - PROBLEM/PLAN-7
Called patient. Informed him of MD message.     No further questions at this time.   
Please let him know this date is ok  
Pt wants MD to be aware the first available for a echo is 05/17/19.         Pt wasn't sure if MD wanted PT to be seen sooner for the echo    Please call his cell if a new order is needed to get a stat or Asap appt.      Telephone Information:   Mobile [phone]       
DISPLAY PLAN FREE TEXT

## 2020-10-13 ENCOUNTER — TRANSCRIPTION ENCOUNTER (OUTPATIENT)
Age: 65
End: 2020-10-13

## 2020-10-13 VITALS
HEART RATE: 76 BPM | DIASTOLIC BLOOD PRESSURE: 53 MMHG | TEMPERATURE: 98 F | RESPIRATION RATE: 17 BRPM | OXYGEN SATURATION: 94 % | SYSTOLIC BLOOD PRESSURE: 96 MMHG

## 2020-10-13 LAB
ANION GAP SERPL CALC-SCNC: 9 MMOL/L — SIGNIFICANT CHANGE UP (ref 5–17)
BUN SERPL-MCNC: 21 MG/DL — SIGNIFICANT CHANGE UP (ref 7–23)
CALCIUM SERPL-MCNC: 8.5 MG/DL — SIGNIFICANT CHANGE UP (ref 8.4–10.5)
CHLORIDE SERPL-SCNC: 98 MMOL/L — SIGNIFICANT CHANGE UP (ref 96–108)
CO2 SERPL-SCNC: 29 MMOL/L — SIGNIFICANT CHANGE UP (ref 22–31)
CREAT SERPL-MCNC: 0.94 MG/DL — SIGNIFICANT CHANGE UP (ref 0.5–1.3)
CRP SERPL-MCNC: 4.94 MG/DL — HIGH (ref 0–0.4)
ERYTHROCYTE [SEDIMENTATION RATE] IN BLOOD: 35 MM/HR — HIGH
GLUCOSE BLDC GLUCOMTR-MCNC: 122 MG/DL — HIGH (ref 70–99)
GLUCOSE BLDC GLUCOMTR-MCNC: 137 MG/DL — HIGH (ref 70–99)
GLUCOSE SERPL-MCNC: 104 MG/DL — HIGH (ref 70–99)
HCT VFR BLD CALC: 30.3 % — LOW (ref 39–50)
HGB BLD-MCNC: 8.6 G/DL — LOW (ref 13–17)
MCHC RBC-ENTMCNC: 20.8 PG — LOW (ref 27–34)
MCHC RBC-ENTMCNC: 28.4 GM/DL — LOW (ref 32–36)
MCV RBC AUTO: 73.2 FL — LOW (ref 80–100)
NRBC # BLD: 0 /100 WBCS — SIGNIFICANT CHANGE UP (ref 0–0)
PLATELET # BLD AUTO: 407 K/UL — HIGH (ref 150–400)
POTASSIUM SERPL-MCNC: 4.6 MMOL/L — SIGNIFICANT CHANGE UP (ref 3.5–5.3)
POTASSIUM SERPL-SCNC: 4.6 MMOL/L — SIGNIFICANT CHANGE UP (ref 3.5–5.3)
RBC # BLD: 4.14 M/UL — LOW (ref 4.2–5.8)
RBC # FLD: 22.8 % — HIGH (ref 10.3–14.5)
SARS-COV-2 RNA SPEC QL NAA+PROBE: SIGNIFICANT CHANGE UP
SODIUM SERPL-SCNC: 136 MMOL/L — SIGNIFICANT CHANGE UP (ref 135–145)
WBC # BLD: 8 K/UL — SIGNIFICANT CHANGE UP (ref 3.8–10.5)
WBC # FLD AUTO: 8 K/UL — SIGNIFICANT CHANGE UP (ref 3.8–10.5)

## 2020-10-13 PROCEDURE — 93306 TTE W/DOPPLER COMPLETE: CPT

## 2020-10-13 PROCEDURE — 85652 RBC SED RATE AUTOMATED: CPT

## 2020-10-13 PROCEDURE — 96374 THER/PROPH/DIAG INJ IV PUSH: CPT

## 2020-10-13 PROCEDURE — 85027 COMPLETE CBC AUTOMATED: CPT

## 2020-10-13 PROCEDURE — 73590 X-RAY EXAM OF LOWER LEG: CPT

## 2020-10-13 PROCEDURE — 84100 ASSAY OF PHOSPHORUS: CPT

## 2020-10-13 PROCEDURE — 80053 COMPREHEN METABOLIC PANEL: CPT

## 2020-10-13 PROCEDURE — U0003: CPT

## 2020-10-13 PROCEDURE — 80048 BASIC METABOLIC PNL TOTAL CA: CPT

## 2020-10-13 PROCEDURE — 97530 THERAPEUTIC ACTIVITIES: CPT

## 2020-10-13 PROCEDURE — 97161 PT EVAL LOW COMPLEX 20 MIN: CPT

## 2020-10-13 PROCEDURE — 82962 GLUCOSE BLOOD TEST: CPT

## 2020-10-13 PROCEDURE — 73552 X-RAY EXAM OF FEMUR 2/>: CPT

## 2020-10-13 PROCEDURE — 36415 COLL VENOUS BLD VENIPUNCTURE: CPT

## 2020-10-13 PROCEDURE — 86140 C-REACTIVE PROTEIN: CPT

## 2020-10-13 PROCEDURE — 97116 GAIT TRAINING THERAPY: CPT

## 2020-10-13 PROCEDURE — 99285 EMERGENCY DEPT VISIT HI MDM: CPT | Mod: 25

## 2020-10-13 PROCEDURE — 97110 THERAPEUTIC EXERCISES: CPT

## 2020-10-13 PROCEDURE — 83735 ASSAY OF MAGNESIUM: CPT

## 2020-10-13 PROCEDURE — 80202 ASSAY OF VANCOMYCIN: CPT

## 2020-10-13 PROCEDURE — 85025 COMPLETE CBC W/AUTO DIFF WBC: CPT

## 2020-10-13 RX ORDER — FUROSEMIDE 40 MG
40 TABLET ORAL DAILY
Refills: 0 | Status: DISCONTINUED | OUTPATIENT
Start: 2020-10-13 | End: 2020-10-13

## 2020-10-13 RX ORDER — FUROSEMIDE 40 MG
1 TABLET ORAL
Qty: 0 | Refills: 0 | DISCHARGE

## 2020-10-13 RX ORDER — FUROSEMIDE 40 MG
1 TABLET ORAL
Qty: 0 | Refills: 0 | DISCHARGE
Start: 2020-10-13

## 2020-10-13 RX ADMIN — MORPHINE SULFATE 30 MILLIGRAM(S): 50 CAPSULE, EXTENDED RELEASE ORAL at 05:52

## 2020-10-13 RX ADMIN — MORPHINE SULFATE 15 MILLIGRAM(S): 50 CAPSULE, EXTENDED RELEASE ORAL at 12:01

## 2020-10-13 RX ADMIN — Medication 20 MILLIGRAM(S): at 05:52

## 2020-10-13 RX ADMIN — Medication 81 MILLIGRAM(S): at 11:58

## 2020-10-13 RX ADMIN — GABAPENTIN 400 MILLIGRAM(S): 400 CAPSULE ORAL at 05:52

## 2020-10-13 RX ADMIN — Medication 300 UNIT(S): at 12:00

## 2020-10-13 RX ADMIN — CHLORHEXIDINE GLUCONATE 1 APPLICATION(S): 213 SOLUTION TOPICAL at 12:09

## 2020-10-13 RX ADMIN — SERTRALINE 25 MILLIGRAM(S): 25 TABLET, FILM COATED ORAL at 12:09

## 2020-10-13 RX ADMIN — PRASUGREL 10 MILLIGRAM(S): 5 TABLET, FILM COATED ORAL at 11:58

## 2020-10-13 RX ADMIN — Medication 4 UNIT(S): at 08:53

## 2020-10-13 RX ADMIN — GABAPENTIN 400 MILLIGRAM(S): 400 CAPSULE ORAL at 14:16

## 2020-10-13 RX ADMIN — MORPHINE SULFATE 30 MILLIGRAM(S): 50 CAPSULE, EXTENDED RELEASE ORAL at 14:16

## 2020-10-13 RX ADMIN — MORPHINE SULFATE 30 MILLIGRAM(S): 50 CAPSULE, EXTENDED RELEASE ORAL at 06:20

## 2020-10-13 RX ADMIN — Medication 40 MILLIGRAM(S): at 11:58

## 2020-10-13 RX ADMIN — MORPHINE SULFATE 15 MILLIGRAM(S): 50 CAPSULE, EXTENDED RELEASE ORAL at 13:00

## 2020-10-13 RX ADMIN — Medication 25 MILLIGRAM(S): at 05:52

## 2020-10-13 RX ADMIN — SPIRONOLACTONE 25 MILLIGRAM(S): 25 TABLET, FILM COATED ORAL at 05:53

## 2020-10-13 RX ADMIN — DULOXETINE HYDROCHLORIDE 30 MILLIGRAM(S): 30 CAPSULE, DELAYED RELEASE ORAL at 11:58

## 2020-10-13 RX ADMIN — Medication 0.12 MILLIGRAM(S): at 05:52

## 2020-10-13 RX ADMIN — MORPHINE SULFATE 30 MILLIGRAM(S): 50 CAPSULE, EXTENDED RELEASE ORAL at 15:20

## 2020-10-13 RX ADMIN — Medication 4 UNIT(S): at 14:19

## 2020-10-13 NOTE — DISCHARGE NOTE NURSING/CASE MANAGEMENT/SOCIAL WORK - NSDCFUADDAPPT_GEN_ALL_CORE_FT
***A CBC with diff, CMP, CRP, ESR, Vancomycin trough should be drawn weekly and faxed to Dr. Bradshaw's office at [phone]***

## 2020-10-13 NOTE — PROGRESS NOTE ADULT - REASON FOR ADMISSION
Knee pain
R Knee Swelling
R LE Pain
R LE Pain
R knee pain
RLE Pain
dyspnea
dyspnea
knee pain
knee pain

## 2020-10-13 NOTE — PROGRESS NOTE ADULT - SUBJECTIVE AND OBJECTIVE BOX
Orthopaedic Surgery Progress Note    Patient seen and examined. ANAHI. Patient without acute complaints. Pain controlled. Denies CP, SOB, N/V, tactile fevers, calf pain.  Pt is s/p right knee revision antibiotic spacer + Right gastroc flap, left STSG (managed by plastic surgery)  Pt is on Vancomycin and Rifampin per ID recs.   Pt has been accepted to [facility], Covid swab pending       Vital Signs Last 24 Hrs  T(C): 36.9 (13 Oct 2020 08:01), Max: 37.2 (12 Oct 2020 20:40)  T(F): 98.5 (13 Oct 2020 08:01), Max: 99 (12 Oct 2020 20:40)  HR: 82 (13 Oct 2020 08:01) (79 - 84)  BP: 106/66 (13 Oct 2020 08:01) (106/66 - 117/74)  BP(mean): --  RR: 16 (13 Oct 2020 08:01) (16 - 18)  SpO2: 95% (13 Oct 2020 08:01) (95% - 98%)         CAPILLARY BLOOD GLUCOSE      POCT Blood Glucose.: 137 mg/dL (13 Oct 2020 08:16)  POCT Blood Glucose.: 163 mg/dL (12 Oct 2020 21:27)  POCT Blood Glucose.: 157 mg/dL (12 Oct 2020 17:40)  POCT Blood Glucose.: 142 mg/dL (12 Oct 2020 12:38)                  Physical Exam:  Pt sitting comfortably in bed, NAD.  Skin warm and well perfused, no visible erythema/ecchymoses.  Dressing C/D/I - changed by Dr. Castro this morning. RLE KI and ace in place   EHL/FHL/TA/GS firing bilateral lower extremities   Calves soft and nontender to palpation bilaterally   DP pulses palpable bilaterally     LABS                        8.6    8.00  )-----------( 407      ( 13 Oct 2020 06:55 )             30.3                                10-13    136  |  98  |  21  ----------------------------<  104<H>  4.6   |  29  |  0.94    Ca    8.5      13 Oct 2020 06:55            A/P: 65M s/p right knee revision antibiotic spacer + Right gastroc flap, left STSG (managed by plastic surgery)    CONTINUE:        1. PT: TTWB RLE in seamus brace; WBAT LLE  2. DVT prophylaxis: SCDs, Effient   3. Pain Control as needed   4. Dispo: [facility]   5. Medicine, Plastics, Cardiology recs appreciated

## 2020-10-13 NOTE — PROGRESS NOTE ADULT - SUBJECTIVE AND OBJECTIVE BOX
Pain Management Progress Note - [facility] [phone]        HPI: 65yMale admitted for R knee pain, drainage s/p right knee arthroscopic I+D 7/17, right knee explant and abx spacer 7/22 with Dr. Castro;  and debridement of LLE wound with wound vac placement on 7/30 by Dr. Bowen. Was original d/c'd home on 8/10/20 with PICC and vancomycin IV q12h + rifampin.  Readmitted and s/p R hip exchange of hardware on 09/04/2020; I+D of RUQ on 09/04/2020; R Knee Revision Antibiotic Spacer by Dr. Castro, R Knee medial gastroc flap and LLE STSG by Dr. Mckeon on 9/18. Discharged to [facility] on 10/3/20 and returned to [facility] ER the same day with reports of uncontrolled pain and lack of medical treatment at [facility]. Patient reports lower back pain and RLE pain, pain managed today with current pain medication regimen. Patient Axox3, denies any side effects from current pain medication regimen. Patient R leg wrapped with an ace bandage, dresisng intact.      Pain is __x_ sharp ____dull ___burning _x__achy ___ Intensity: __x__ mild _x_mod ___severe     Location ____surgical site ____cervical _____lumbar ____abd ____upper ext___x_R lower ext    Worse with __x__activity __x__movement _____physical therapy___ Rest    Improved with _x___medication _x___rest ____physical therapy      furosemide    Tablet  furosemide   Injectable  chlorhexidine 2% Cloths  furosemide   Injectable  morphine ER Tablet  morphine ER Tablet  morphine  IR  morphine  - Injectable  heparin  Lock Flush 100 Units/mL Injectable  gabapentin  morphine ER Tablet  BACItracin   Ointment  morphine  - Injectable  BACItracin   Ointment  diphenhydrAMINE  morphine  - Injectable  insulin lispro Injectable (HumaLOG)  insulin glargine Injectable (LANTUS)  furosemide   Injectable  glucagon  Injectable  dextrose 50% Injectable  dextrose 50% Injectable  dextrose 50% Injectable  dextrose 40% Gel  dextrose 5%.  insulin lispro (HumaLOG) corrective regimen sliding scale  furosemide    Tablet  metoprolol succinate ER  atorvastatin  sertraline  DULoxetine  digoxin     Tablet  tamsulosin  spironolactone  prasugrel  aspirin  rifAMPin  vancomycin  IVPB  morphine ER Tablet  morphine  - Injectable  gabapentin  cyclobenzaprine  morphine  IR  senna  bisacodyl Suppository  polyethylene glycol 3350  magnesium hydroxide Suspension  ondansetron Injectable  aluminum hydroxide/magnesium hydroxide/simethicone Suspension  oxyCODONE    IR  oxyCODONE    IR  acetaminophen   Tablet ..  morphine  - Injectable      ROS: Const:  _-__febrile   Eyes:___ENT:___CV: _-__chest pain  Resp: __-__sob  GI:_-__nausea _-__vomiting _-__abd pain ___npo ___clears x__full diet __bm  :___ Musk: _x__pain _-__spasm  Skin:___ Neuro:  _-__ceousvog_-__kyewopjas__-_ numbness _-__weakness _-__paresth  Psych:__anxiety  Endo:___ Heme:___Allergy:_________, _x__all others reviewed and negative      PAST MEDICAL & SURGICAL HISTORY:  Diabetic neuropathy  STEMI (ST elevation myocardial infarction)  Diverticulitis  MRSA bacteremia  History of celiac disease  CHF (congestive heart failure)  EF ~ 25%  HTN (hypertension)  Diabetes  on insulin pump  Blood clot due to device, implant, or graft  was on blood thinners  HLD (hyperlipidemia)  Osteoarthritis  Atherosclerosis of coronary artery  CAD (coronary artery disease)  Status post percutaneous transluminal coronary angioplasty  in 2012  History of open reduction and internal fixation (ORIF) procedure  Surgery, elective  Right shoulder  Surgery, elective  right knee wound debridement  S/P TKR (total knee replacement), right  with infection Mrsa   per pt he was cleared from MRSA infection  S/P CABG x 1  2018  Stented coronary artery  10/18 heart attack  INFERIOR WALL MI  Other postprocedural status  Fixation hardware in foot LEFT      10-13 @ 06:5586 mL/min/1.73M2      Hemoglobin: 8.6 g/dL (10-13 @ 06:55)        T(C): 36.9 (10-13-20 @ 12:52), Max: 37.2 (10-12-20 @ 20:40)  HR: 76 (10-13-20 @ 12:52) (76 - 84)  BP: 96/53 (10-13-20 @ 12:52) (96/53 - 117/74)  RR: 17 (10-13-20 @ 12:52) (16 - 17)  SpO2: 94% (10-13-20 @ 12:52) (94% - 98%)  Wt(kg): --       PHYSICAL EXAM:  Gen Appearance: x___no acute distress _x__appropriate        Neuro: _x__SILT feet____ EOM Intact Psych: AAOX_3_, _x__mood/affect appropriate        Eyes: _x__conjunctiva WNL  __x___ Pupils equal and round        ENT: _x__ears and nose atraumatic__x_ Hearing grossly intact        Neck: _x__trachea midline, no visible masses ___thyroid without palpable mass    Resp: _x__Nml WOB____No tactile fremitus ___clear to auscultation    Cardio: _x__extremities free from edema __x__pedal pulses palpable    GI/Abdomen: __x_soft ___x__ Nontender__x____Nondistended_____HSM    Lymphatic: ___no palpable nodes in neck  ___no palpable nodes calves and feet    Skin/Wound: ___Incision, ___Dressing c/d/i,   ____surrounding tissues soft,  ___drain/chest tube present____    Muscular: EHL _4__/5  Gastrocnemius__4_/5    ___absent clubbing/cyanosis        ASSESSMENT: As per HPI: 65yMale admitted for R knee pain, drainage s/p right knee arthroscopic I+D 7/17, right knee explant and abx spacer 7/22 with Dr. Castro;  and debridement of LLE wound with wound vac placement on 7/30 by Dr. Bowen. Was original d/c'd home on 8/10/20 with PICC and vancomycin IV q12h + rifampin.  Readmitted and s/p R hip exchange of hardware on 09/04/2020; I+D of RUQ on 09/04/2020; R Knee Revision Antibiotic Spacer by Dr. Castro, R Knee medial gastroc flap and LLE STSG by Dr. Mckeon on 9/18. Discharged to [facility] on 10/3/20 and returned to [facility] ER the same day with reports of uncontrolled pain and lack of medical treatment at [facility]. Patient reports RLE pain and lower back pain, patient reports moderate pain relief with current pain medication regimen.       Recommended Treatment PLAN:  1. Morphine 15mg PO Q4h prn severe pain  2. Mscontin 30mg PO BID  3. Gabapentin 400mg PO TID  4. tylenol 975mg PO Q8  Plan discussed with Dr. Max

## 2020-10-13 NOTE — PROGRESS NOTE ADULT - NUTRITIONAL ASSESSMENT
This patient has been assessed with a concern for Malnutrition and has been determined to have a diagnosis/diagnoses of Moderate protein-calorie malnutrition.    This patient is being managed with:   Diet Consistent Carbohydrate/No Snacks-  Supplement Feeding Modality:  Oral  Glucerna Shake Cans or Servings Per Day:  2       Frequency:  Daily  Entered: Oct 11 2020 11:04AM    

## 2020-10-13 NOTE — DISCHARGE NOTE NURSING/CASE MANAGEMENT/SOCIAL WORK - PATIENT PORTAL LINK FT
You can access the FollowMyHealth Patient Portal offered by [facility] by registering at the following website: [email or web address]. By joining [employer]’s FollowMyHealth portal, you will also be able to view your health information using other applications (apps) compatible with our system.

## 2020-10-13 NOTE — PROGRESS NOTE ADULT - SUBJECTIVE AND OBJECTIVE BOX
Ortho Note    Pt mood improved this AM, was more talkative and in better spirits. Wants plastics to examine leg before he leaves. Denies CP/SOB/N/V.    Vital Signs Last 24 Hrs  T(C): 37.1 (13 Oct 2020 05:45), Max: 37.2 (12 Oct 2020 20:40)  T(F): 98.7 (13 Oct 2020 05:45), Max: 99 (12 Oct 2020 20:40)  HR: 82 (13 Oct 2020 05:45) (79 - 84)  BP: 117/74 (13 Oct 2020 05:45) (110/65 - 117/74)  BP(mean): --  RR: 17 (13 Oct 2020 05:45) (17 - 18)  SpO2: 98% (13 Oct 2020 05:45) (96% - 98%)    General: Pt Alert and oriented, NAD  DSG ace wrap C/D/I  Pulses:  DPs palpable b/l le   Sensation:  SILT throughout distal le b/l   Motor: EHL/FHL/TA/GS 5/5 b/l le   calves soft compressible NTTP               A/P: 65yMale admitted for R knee pain, drainage s/p right knee arthroscopic I+D 7/17, right knee explant and abx spacer 7/22 with Dr. Castro;  and debridement of LLE wound with wound vac placement on 7/30 by Dr. Bowen. Was original d/c'd home on 8/10/20 with PICC and vancomycin IV q12h + rifampin.  Readmitted and s/p R hip exchange of hardware on 09/04/2020; I+D of RUQ on 09/04/2020; R Knee Revision Antibiotic Spacer by Dr. Castro, R Knee medial gastroc flap and LLE STSG by Dr. Mckeon on 9/18. Discharged to [facility] on 10/3/20 and returned to [facility] ER the same day with reports of uncontrolled pain and lack of medical treatment at [facility]  - AM labs EVERY OTHER DAY plan to f/u with ID Dr. Casillas outpatient; will consult with any change in patient status or lab results  - medical and cardiology co-management; no current need for telemetry monitoring as per recs  - 20mg IV pushes of lasix for acute SOB/Dyspnea  - Pain Control- appreciate pain management recs  - DVT ppx: ASA + effient (home regimen)  - PT, WBS: TTWB RLE in seamus brace; WBAT LLE  - dressing changes as per plastics recs (Dr. Calvillo) on 10/2 prior to previous d/c- bacitracin, adaptiq, abd pads to LLE and RLE skin graft sites; RLE to be wrapped loosely in ace wrap  - podiatry debridements from prior admission well-appearing- will reconsult as needed; follow-up outpatient  - RUQ drain site not actively draining at this time- plan to follow-up with Dr. Latif regarding elective cholecystectomy in the future  - OOB for meals as tolerated, aggressive I/S  - bowel regimen  - dispo: [facility] pending auth, accepted into [facility]    Ortho Pager [phone]

## 2020-10-21 LAB
CULTURE RESULTS: SIGNIFICANT CHANGE UP
SPECIMEN SOURCE: SIGNIFICANT CHANGE UP

## 2020-10-23 NOTE — PROGRESS NOTE ADULT - I WAS PHYSICALLY PRESENT FOR THE KEY PORTIONS OF THE EVALUATION AND MANAGEMENT (E/M) SERVICE PROVIDED.  I AGREE WITH THE ABOVE HISTORY, PHYSICAL, AND PLAN WHICH I HAVE REVIEWED AND EDITED WHERE APPROPRIATE
The access site was successfully closed using a (Sheath Slender Ss [id number] 0.02) closure device. Radial arm band Statement Selected

## 2020-10-24 LAB
CULTURE RESULTS: SIGNIFICANT CHANGE UP
SPECIMEN SOURCE: SIGNIFICANT CHANGE UP

## 2020-10-27 ENCOUNTER — LABORATORY RESULT (OUTPATIENT)
Age: 65
End: 2020-10-27

## 2020-10-28 ENCOUNTER — APPOINTMENT (OUTPATIENT)
Dept: ORTHOPEDIC SURGERY | Facility: CLINIC | Age: 65
End: 2020-10-28
Payer: COMMERCIAL

## 2020-10-28 VITALS
WEIGHT: 211 LBS | HEART RATE: 83 BPM | BODY MASS INDEX: 27.84 KG/M2 | DIASTOLIC BLOOD PRESSURE: 75 MMHG | SYSTOLIC BLOOD PRESSURE: 120 MMHG

## 2020-10-28 PROCEDURE — 73590 X-RAY EXAM OF LOWER LEG: CPT | Mod: RT

## 2020-10-28 PROCEDURE — 99024 POSTOP FOLLOW-UP VISIT: CPT

## 2020-10-28 PROCEDURE — 73552 X-RAY EXAM OF FEMUR 2/>: CPT | Mod: RT

## 2020-10-28 PROCEDURE — 99072 ADDL SUPL MATRL&STAF TM PHE: CPT

## 2020-10-28 PROCEDURE — 73560 X-RAY EXAM OF KNEE 1 OR 2: CPT | Mod: RT

## 2020-10-28 NOTE — HISTORY OF PRESENT ILLNESS
[de-identified] : s/p R knee antibiotic cement spacer to spacer exchange with medial gastrocnemius flap coverage, left tibia debridement, bilateral lower extremity split thickness skin grafting on 9/18/20 [de-identified] : Has been at [facility], tolerating it so far. Complains of worsening pain over time. Still unable to ambulate secondary to bilateral knee pain. Reports no further drainage from the old biliary fistula. Has been receiving wound care from a dedicated nurse at the facility. Has been receiving vancomycin on schedule, overseen by Dr. Casillas. Remains very depressed. [de-identified] : Bilateral thigh STSG donor sites healed. Right knee wound with STSG mostly taken, though with fibrinous area at the superior apex and raw bleeding from the superolateral margin. No purulence or fluctuance; no erythema. Entire RLE with pitting edema from thigh to foot. Posteromedial leg incision healed. Knee set at about 20deg flexion, stable. Ankle DF/PF intact.\par \par Left leg with healed tibia STSG, no drainage or purulence, no surrounding erythema. [de-identified] : AP/lateral right tibia, knee, and femur XRs were taken today and interpreted by me. There has been no interval change in position of the short cephalomedullary hip nail or the static antibiotic cement spacer. The superior dowel cement mantle has broken but the Foster pin is intact. No new fracture of bone.\par \par Labs reviewed; most recent labs demonstrate persistently elevated ESR/CRP, normal WBC. [de-identified] : 66y/o male nearly 6wk s/p right knee spacer to spacer exchange for MRSA PJI [de-identified] : Had a long discussion with him and wife regarding the prognosis and treatment options. The inflammatory markers never improved. This may indicate ongoing osteomyelitis of the knee. He does not think he can tolerate further multistage procedures; is not willing to consider another spacer exchange. He is also unwilling to consider knee fusion surgery or amputation. We discussed the possibility of right knee reimplantation. He understands that there is a significant risk of infection recurrence and that this may necessitate one of the above limb salvage procedures versus above knee amputation.\par \par Will stop vancomycin at the 6-week kt this Friday. Antibiotic holiday for 2 weeks with weekly labs. See me back at the end of the holiday for repeat labs, one more attempt at knee aspiration. If any decompensation during the holiday, he is not a candidate for reimplantation. Provided that all remains stable, we will discuss reimplantation at that time. Follow up with Tiago Mckeon and Sonja as scheduled. Repeat labs today.

## 2020-10-29 LAB
BASOPHILS # BLD AUTO: 0.12 K/UL
BASOPHILS NFR BLD AUTO: 1.5 %
CRP SERPL-MCNC: 3.47 MG/DL
EOSINOPHIL # BLD AUTO: 0.34 K/UL
EOSINOPHIL NFR BLD AUTO: 4.2 %
ERYTHROCYTE [SEDIMENTATION RATE] IN BLOOD BY WESTERGREN METHOD: 44 MM/HR
HCT VFR BLD CALC: 33.3 %
HGB BLD-MCNC: 8.9 G/DL
IMM GRANULOCYTES NFR BLD AUTO: 0.4 %
LYMPHOCYTES # BLD AUTO: 0.86 K/UL
LYMPHOCYTES NFR BLD AUTO: 10.6 %
MAN DIFF?: NORMAL
MCHC RBC-ENTMCNC: 19.6 PG
MCHC RBC-ENTMCNC: 26.7 GM/DL
MCV RBC AUTO: 73.3 FL
MONOCYTES # BLD AUTO: 1.07 K/UL
MONOCYTES NFR BLD AUTO: 13.2 %
NEUTROPHILS # BLD AUTO: 5.68 K/UL
NEUTROPHILS NFR BLD AUTO: 70.1 %
PLATELET # BLD AUTO: 452 K/UL
RBC # BLD: 4.54 M/UL
RBC # FLD: 23.7 %
WBC # FLD AUTO: 8.1 K/UL

## 2020-11-03 ENCOUNTER — APPOINTMENT (OUTPATIENT)
Dept: INFECTIOUS DISEASE | Facility: CLINIC | Age: 65
End: 2020-11-03
Payer: COMMERCIAL

## 2020-11-03 PROCEDURE — 99214 OFFICE O/P EST MOD 30 MIN: CPT | Mod: 95

## 2020-11-03 NOTE — PHYSICAL EXAM
[General Appearance - Alert] : alert [Outer Ear] : the ears and nose were normal in appearance [] : no respiratory distress [FreeTextEntry1] : Thigh skin donor sites healing well.  Unable to visualize knee

## 2020-11-03 NOTE — REASON FOR VISIT
[Post Hospitalization] : a post hospitalization visit [Other Location: e.g. School (Enter Location, City,State)___] : at [unfilled], at the time of the visit. [Medical Office: ([facility])___] : at the medical office located in  [Other:____] : [unfilled]

## 2020-11-03 NOTE — HISTORY OF PRESENT ILLNESS
[FreeTextEntry1] : 64 yo M with HTN, hyperlipidemia, type II DM with peripheral neuropathy, CAD s/p MIDCAB (2018)/VERONICA, HFrEF, AICD (2/20), pulmonary HTN, chronic cholecystitis with recurrent R knee PPJI s/p arthroscopic I&D 7/17 and debridement with explantation and spacer placement 7/22. MRSA grew in blood and from joint fluid. Isolate had the same antibiogram as isolate in 11/2019.  He is also s/p OR debridement of left anterior tibia and wound vac placement on 7/30.  He was discharged 8/10 to complete 6 week course of rifampin 300 mg PO q 12 h and vancomycin 1 g IV q 12 h (7/22/20 – 9/1/20).  During TEB follow up on 9/1, he reported drainage of pus from scar from prior cholecystostomy tube (d/jovanni in 5/2020) for ~4 weeks with increased drainage X ~3 weeks.  He had been advised to see a general surgeon but had not yet done so.  He reported to [facility] ED later that day with right knee redness, swelling and warmth.  R knee was aspirated by Dr. Castro on 9/2 – small amount of blood obtained.  On 9/4 he underwent right hip hardware exchange and open biopsy to evaluate for potential source of infection. OR cultures did not grow.  He also had I&D of abdominal wall – cultures grew Pseudomonas and Enterococcus faecium.  Vancomycin discontinued. He was started on daptomycin and cefepime, rifampin continued. Gallium done 9/10 showed minimal uptake from GB fossa to soft tissue. On 9/18 he underwent R knee spacer exchange, muscle flap and L shin ulcer debridement – cultures negative.  On 9/21 he had worsening RUQ purulent drainage.  Cefepime was discontinued on 9/25.  Daptomycin was changed to vancomycin for discharge to [facility].  He was discharged on 10/3 to continue vancomycin and rifampin until 10/29.  He returned to [facility] the same day reporting poor pain management at [facility].  He was transferred to [facility] on 10/13. He had follow up with Dr. Castro on 10/28.  Plan is to monitor off antibiotics (discontinued on 10/30) and attempt knee aspiration. He reports drainage and swelling from R knee which improved when ace bandage was applied - unclear when he is talking about but keeps saying occurring in "realtime."

## 2020-11-03 NOTE — ASSESSMENT
[FreeTextEntry1] : 64 yo M with HTN, hyperlipidemia, type II DM with peripheral neuropathy, CAD s/p MIDCAB (2018)/VERONICA, HFrEF, AICD (2/20), pulmonary HTN with recurrent R knee PPJI s/p arthroscopic I&D 7/17 and debridement with explantation and spacer placement 7/22.  MRSA grew in blood and from joint fluid.  Isolate had the same antibiogram as isolate in 11/2019, likely persistence. He was treated with 6 weeks of vancomycin and rifampin. On 9/4 he underwent right hip hardware exchange and open biopsy - no growth from cultures.  He completed vancomycin and rifampin on 10/30.  His inflammatory markers remain elevated, concerning for persistent infection.  He is currently off antibiotics for ~2 weeks pending repeat knee aspiration.  Results of aspirate will determine next steps - per Dr. Castro may do reimplantation if findings not c/c infection and culture is negative.  \par Plan:\par - Continue off antibiotics\par - F/u with Dr. Castro on 11/16 as planned\par Follow up in 3 weeks.

## 2020-11-03 NOTE — REVIEW OF SYSTEMS
[Vomiting] : vomiting [As Noted in HPI] : as noted in HPI [Fever] : no fever [Chills] : no chills [Eye Pain] : no eye pain [Eyesight Problems] : no eyesight problems [Nasal Discharge] : no nasal discharge [Sore Throat] : no sore throat [Chest Pain] : no chest pain [Palpitations] : no palpitations [Shortness Of Breath] : no shortness of breath [Cough] : no cough [Abdominal Pain] : no abdominal pain [Dysuria] : no dysuria [Skin Lesions] : no skin lesions [FreeTextEntry7] : vomited once today, last a few weeks ago

## 2020-11-08 NOTE — PATIENT PROFILE ADULT. - NS SC CAGE ALCOHOL CUT DOWN
Siria MARX called from [facility] where the pt is residing, stated pt had contact with anther resident who tested positive for covid 19. RN is requesting covid 19 test order.      RN paged on call provider via answering service,recived a return call from Dr Undewrood, and provider was given RN phone number to call RN directly regarding covid 19 test order request @ [phone].    Fransisco Ham RN  [facility]   
no

## 2020-11-10 ENCOUNTER — APPOINTMENT (OUTPATIENT)
Dept: CARDIOLOGY | Facility: CLINIC | Age: 65
End: 2020-11-10

## 2020-11-11 ENCOUNTER — APPOINTMENT (OUTPATIENT)
Dept: ENDOCRINOLOGY | Facility: CLINIC | Age: 65
End: 2020-11-11
Payer: COMMERCIAL

## 2020-11-11 LAB
CULTURE RESULTS: SIGNIFICANT CHANGE UP
SPECIMEN SOURCE: SIGNIFICANT CHANGE UP

## 2020-11-11 PROCEDURE — 99215 OFFICE O/P EST HI 40 MIN: CPT | Mod: 95

## 2020-11-11 RX ORDER — DULAGLUTIDE 1.5 MG/.5ML
1.5 INJECTION, SOLUTION SUBCUTANEOUS
Qty: 3 | Refills: 3 | Status: DISCONTINUED | COMMUNITY
Start: 2019-07-12 | End: 2020-11-11

## 2020-11-12 NOTE — ADDENDUM
[FreeTextEntry1] : I, Deena Ravi, acted solely as a scribe for Dr. Michael Cormier on this date. 11/11/2020.

## 2020-11-12 NOTE — HISTORY OF PRESENT ILLNESS
[Home] : at home, [unfilled] , at the time of the visit. [Medical Office: ([facility])___] : at the medical office located in  [Verbal consent obtained from patient] : the patient, [unfilled] [FreeTextEntry1] : 62 y/o M pt, with Hx of DM (dx ~29 yrs) on insulin pump, with DM related complications of severe retinopathy s/p Lasix surgery in 8/2019, peripheral neuropathy,  Hx of R middle and second toe ulcer and CAD.\par Other PMHx: CHF, minimally invasive CABG (in ~8/2018) and then a coronary stent thrombosis, STEMI, hypercholesterolemia, obesity, peripheral vascular disease, anemia, osteoarthritis, Hip fracture (2/2020) s/p surgical repair\par PSHx: R knee replacement (10/2019), Cholecystectomy (2019)\par Drug allergies: ACE inhibitors, Atorvastatin, Carvedilol, Entresto\par Last funduscopic visit: a while ago.\par \par 11/11/2020 \par Pt did not have any questions prior to the initiation of the telehealth visit. \par Pt presents today via telehealth as recommended for DM f/u as pt was recently discharged from hospital for knee infection. His last visit was in 5/2019 and few events have occurred since then. He underwent R knee replacement in 10/2019 and cholecystectomy in the same year. He sustained hip fracture on 2/2020 and was surgically treated. He was also dx with CHF and is on Digoxin and Lasix. \par In general, pt feels good with no major complaints, Denies SOB,CP.No weight loss. He eats 2-3 meals a day. Pt was on rehabilitation for past 30 days and he reports of problem obtaining results with [employer] for past 3 days. He is on insulin pump and he was not sure about the basal and bolus setting of the CSII . He mentions taking 1unit/hr.\par \par Current Meds: Insulin pump, Lipitor, Digoxin, Lasix, Gabapentin, Metoprolol 25 mg BID, Morphine alternating 50 and 30.\par \par Labs:\par - 5/3/19: A1c 8.8% (9.7% in 10/2018 and 9.8% in 2017), s.creat 1.21, LDL-c 51,  TSH 5.1,

## 2020-11-12 NOTE — END OF VISIT
[FreeTextEntry3] : All medical record entries made by the Scribe were at my, Dr. Michael Cormier, direction and personally dictated by me on 11/11/2020. I have reviewed the chart and agree that the record accurately reflects my personal performance of the history, physical exam, assessment and plan. I have also personally directed, reviewed and agreed with the chart.  [Time Spent: ___ minutes] : I have spent [unfilled] minutes of time on the encounter. [>50% of the face to face encounter time was spent on counseling and/or coordination of care for ___] : Greater than 50% of the face to face encounter time was spent on counseling and/or coordination of care for [unfilled]

## 2020-11-12 NOTE — ASSESSMENT
[Carbohydrate Consistent Diet] : carbohydrate consistent diet [Importance of Diet and Exercise] : importance of diet and exercise to improve glycemic control, achieve weight loss and improve cardiovascular health [Exercise/Effect on Glucose] : exercise/effect on glucose [FreeTextEntry1] : 64 y/o M pt with:\par \par 1. Hx of T2DM (dx ~29 yrs ago) with multiple DM related complications:\par He has been hospitalized in multiple occasions over past two years because of joint problems, infection, gall bladder and hip fx. Pt reports that his BS is okay, he is currently not testing due to malfunction of [employer] Cathleen (he is contacting [employer] company). \par General principles of DM care was reviewed. Hypoglycemia prevention and treatment explained to pt. \par Will order lab test for [facility] to have home blood drawn.  \par He is scheduled for televideo next week with NP to review labs, assist with BS monitor and adjust his insulin settings. \par Recommend pt to follow up with ophthalmologist and podiatrist ( foot ulcers). \par Endo on-site f/u in 8 weeks [Diabetes Foot Care] : diabetes foot care [Hypoglycemia Management] : hypoglycemia management

## 2020-11-14 LAB
CULTURE RESULTS: SIGNIFICANT CHANGE UP
SPECIMEN SOURCE: SIGNIFICANT CHANGE UP

## 2020-11-16 ENCOUNTER — LABORATORY RESULT (OUTPATIENT)
Age: 65
End: 2020-11-16

## 2020-11-16 ENCOUNTER — APPOINTMENT (OUTPATIENT)
Dept: ORTHOPEDIC SURGERY | Facility: CLINIC | Age: 65
End: 2020-11-16
Payer: COMMERCIAL

## 2020-11-16 PROCEDURE — 99024 POSTOP FOLLOW-UP VISIT: CPT

## 2020-11-16 PROCEDURE — 20610 DRAIN/INJ JOINT/BURSA W/O US: CPT | Mod: 58,RT

## 2020-11-16 NOTE — HISTORY OF PRESENT ILLNESS
[de-identified] : s/p R knee antibiotic cement spacer to spacer exchange with medial gastrocnemius flap coverage, left tibia debridement, bilateral lower extremity split thickness skin grafting on 9/18/20 [de-identified] : Doing better than last visit. Back at home, being taken care of by wife Raine and [facility] home care. Pain is improved from prior, though still unable to ambulate more than 10-15 feet at a time. The Jessie has fallen apart. The left leg wound is essentially healed. He is doing local wound care for marginal necrosis of the right knee wound. Still with entire RLE swelling. Shin pain is better, still significant knee pain. He reports that the gallbladder wound has begun draining purulent fluid again as of about 5-6 days ago.  [de-identified] : RLE with diffuse pitting edema from thigh to foot. Knee wound with dry eschar and medial and lateral aspects, fibrinous/granulation material at superior apex of the graft site. Central portion with healed skin graft. No active purulence or drainage. \par \par Left leg wound healed, dry; no erythema/drainage, no dehiscence [de-identified] : 66y/o male 2mo s/p R knee revision antibiotic spacer, medial gastroc flap coverage and left leg skin grafting [de-identified] : Right knee aspiration today, f/u cell count and cultures\par Repeat serum inflammatory markers today\par New rx given for [place] locked at 20deg\par Refill pain meds; discussed tapering opioids\par Follow up with Dr. Latif for the purulent biliary drainage\par Callback with lab results to discuss next steps\par Otherwise next visit in 3 weeks

## 2020-11-17 ENCOUNTER — RX RENEWAL (OUTPATIENT)
Age: 65
End: 2020-11-17

## 2020-11-18 ENCOUNTER — LABORATORY RESULT (OUTPATIENT)
Age: 65
End: 2020-11-18

## 2020-11-23 LAB
B PERT IGG+IGM PNL SER: NORMAL
BASOPHILS # BLD AUTO: 0.07 K/UL
BASOPHILS NFR BLD AUTO: 1 %
COLOR FLD: NORMAL
CRP SERPL-MCNC: 0.91 MG/DL
EOSINOPHIL # BLD AUTO: 0.19 K/UL
EOSINOPHIL # FLD MANUAL: 2 %
EOSINOPHIL NFR BLD AUTO: 2.8 %
ERYTHROCYTE [SEDIMENTATION RATE] IN BLOOD BY WESTERGREN METHOD: 60 MM/HR
FLUID INTAKE SUBSTANCE CLASS: NORMAL
HCT VFR BLD CALC: 32.1 %
HGB BLD-MCNC: 8.6 G/DL
IMM GRANULOCYTES NFR BLD AUTO: 0.3 %
LYMPHOCYTES # BLD AUTO: 0.81 K/UL
LYMPHOCYTES # FLD MANUAL: 1 %
LYMPHOCYTES NFR BLD AUTO: 11.9 %
MAN DIFF?: NORMAL
MCHC RBC-ENTMCNC: 18.7 PG
MCHC RBC-ENTMCNC: 26.8 GM/DL
MCV RBC AUTO: 69.6 FL
MONOCYTES # BLD AUTO: 0.77 K/UL
MONOCYTES NFR BLD AUTO: 11.3 %
MONOS+MACROS NFR FLD MANUAL: 9 %
NEUTROPHILS # BLD AUTO: 4.94 K/UL
NEUTROPHILS NFR BLD AUTO: 72.7 %
NEUTS SEG # FLD MANUAL: 88 %
PLATELET # BLD AUTO: 357 K/UL
RBC # BLD: 4.61 M/UL
RBC # FLD MANUAL: ABNORMAL /UL
RBC # FLD: 22.6 %
TOTAL CELLS COUNTED FLD: 3530 /UL
TUBE TYPE: NORMAL
WBC # FLD AUTO: 6.8 K/UL

## 2020-11-27 ENCOUNTER — NON-APPOINTMENT (OUTPATIENT)
Age: 65
End: 2020-11-27

## 2020-12-07 ENCOUNTER — APPOINTMENT (OUTPATIENT)
Dept: ORTHOPEDIC SURGERY | Facility: CLINIC | Age: 65
End: 2020-12-07

## 2020-12-07 LAB — BACTERIA FLD CULT: NORMAL

## 2020-12-08 ENCOUNTER — OUTPATIENT (OUTPATIENT)
Dept: OUTPATIENT SERVICES | Facility: HOSPITAL | Age: 65
LOS: 1 days | Discharge: HOME | End: 2020-12-08
Payer: COMMERCIAL

## 2020-12-08 VITALS
DIASTOLIC BLOOD PRESSURE: 55 MMHG | HEART RATE: 68 BPM | RESPIRATION RATE: 16 BRPM | SYSTOLIC BLOOD PRESSURE: 100 MMHG | WEIGHT: 169.98 LBS | HEIGHT: 73 IN | OXYGEN SATURATION: 99 % | TEMPERATURE: 100 F

## 2020-12-08 DIAGNOSIS — Z98.890 OTHER SPECIFIED POSTPROCEDURAL STATES: Chronic | ICD-10-CM

## 2020-12-08 DIAGNOSIS — Z01.818 ENCOUNTER FOR OTHER PREPROCEDURAL EXAMINATION: ICD-10-CM

## 2020-12-08 DIAGNOSIS — Z41.9 ENCOUNTER FOR PROCEDURE FOR PURPOSES OTHER THAN REMEDYING HEALTH STATE, UNSPECIFIED: Chronic | ICD-10-CM

## 2020-12-08 DIAGNOSIS — D21.4 BENIGN NEOPLASM OF CONNECTIVE AND OTHER SOFT TISSUE OF ABDOMEN: ICD-10-CM

## 2020-12-08 DIAGNOSIS — Z95.5 PRESENCE OF CORONARY ANGIOPLASTY IMPLANT AND GRAFT: Chronic | ICD-10-CM

## 2020-12-08 DIAGNOSIS — Z95.1 PRESENCE OF AORTOCORONARY BYPASS GRAFT: Chronic | ICD-10-CM

## 2020-12-08 DIAGNOSIS — Z96.651 PRESENCE OF RIGHT ARTIFICIAL KNEE JOINT: Chronic | ICD-10-CM

## 2020-12-08 LAB
A1C WITH ESTIMATED AVERAGE GLUCOSE RESULT: 8 % — HIGH (ref 4–5.6)
ALBUMIN SERPL ELPH-MCNC: 3.6 G/DL — SIGNIFICANT CHANGE UP (ref 3.5–5.2)
ALP SERPL-CCNC: 165 U/L — HIGH (ref 30–115)
ALT FLD-CCNC: 93 U/L — HIGH (ref 0–41)
ANION GAP SERPL CALC-SCNC: 12 MMOL/L — SIGNIFICANT CHANGE UP (ref 7–14)
APTT BLD: 28.2 SEC — SIGNIFICANT CHANGE UP (ref 27–39.2)
AST SERPL-CCNC: 83 U/L — HIGH (ref 0–41)
BASOPHILS # BLD AUTO: 0.03 K/UL — SIGNIFICANT CHANGE UP (ref 0–0.2)
BASOPHILS NFR BLD AUTO: 0.5 % — SIGNIFICANT CHANGE UP (ref 0–1)
BILIRUB SERPL-MCNC: 0.5 MG/DL — SIGNIFICANT CHANGE UP (ref 0.2–1.2)
BUN SERPL-MCNC: 35 MG/DL — HIGH (ref 10–20)
CALCIUM SERPL-MCNC: 8.3 MG/DL — LOW (ref 8.5–10.1)
CHLORIDE SERPL-SCNC: 96 MMOL/L — LOW (ref 98–110)
CO2 SERPL-SCNC: 25 MMOL/L — SIGNIFICANT CHANGE UP (ref 17–32)
CREAT SERPL-MCNC: 1.1 MG/DL — SIGNIFICANT CHANGE UP (ref 0.7–1.5)
EOSINOPHIL # BLD AUTO: 0.03 K/UL — SIGNIFICANT CHANGE UP (ref 0–0.7)
EOSINOPHIL NFR BLD AUTO: 0.5 % — SIGNIFICANT CHANGE UP (ref 0–8)
ESTIMATED AVERAGE GLUCOSE: 183 MG/DL — HIGH (ref 68–114)
GLUCOSE SERPL-MCNC: 253 MG/DL — HIGH (ref 70–99)
HCT VFR BLD CALC: 35.3 % — LOW (ref 42–52)
HGB BLD-MCNC: 9.9 G/DL — LOW (ref 14–18)
IMM GRANULOCYTES NFR BLD AUTO: 0.3 % — SIGNIFICANT CHANGE UP (ref 0.1–0.3)
INR BLD: 1.23 RATIO — SIGNIFICANT CHANGE UP (ref 0.65–1.3)
LYMPHOCYTES # BLD AUTO: 0.7 K/UL — LOW (ref 1.2–3.4)
LYMPHOCYTES # BLD AUTO: 11.3 % — LOW (ref 20.5–51.1)
MCHC RBC-ENTMCNC: 18.4 PG — LOW (ref 27–31)
MCHC RBC-ENTMCNC: 28 G/DL — LOW (ref 32–37)
MCV RBC AUTO: 65.5 FL — LOW (ref 80–94)
MONOCYTES # BLD AUTO: 0.88 K/UL — HIGH (ref 0.1–0.6)
MONOCYTES NFR BLD AUTO: 14.1 % — HIGH (ref 1.7–9.3)
NEUTROPHILS # BLD AUTO: 4.56 K/UL — SIGNIFICANT CHANGE UP (ref 1.4–6.5)
NEUTROPHILS NFR BLD AUTO: 73.3 % — SIGNIFICANT CHANGE UP (ref 42.2–75.2)
NRBC # BLD: 0 /100 WBCS — SIGNIFICANT CHANGE UP (ref 0–0)
PLATELET # BLD AUTO: 233 K/UL — SIGNIFICANT CHANGE UP (ref 130–400)
POTASSIUM SERPL-MCNC: 5.5 MMOL/L — HIGH (ref 3.5–5)
POTASSIUM SERPL-SCNC: 5.5 MMOL/L — HIGH (ref 3.5–5)
PROT SERPL-MCNC: 6.9 G/DL — SIGNIFICANT CHANGE UP (ref 6–8)
PROTHROM AB SERPL-ACNC: 14.1 SEC — HIGH (ref 9.95–12.87)
RBC # BLD: 5.39 M/UL — SIGNIFICANT CHANGE UP (ref 4.7–6.1)
RBC # FLD: 23 % — HIGH (ref 11.5–14.5)
SODIUM SERPL-SCNC: 133 MMOL/L — LOW (ref 135–146)
WBC # BLD: 6.22 K/UL — SIGNIFICANT CHANGE UP (ref 4.8–10.8)
WBC # FLD AUTO: 6.22 K/UL — SIGNIFICANT CHANGE UP (ref 4.8–10.8)

## 2020-12-08 PROCEDURE — 93010 ELECTROCARDIOGRAM REPORT: CPT

## 2020-12-08 RX ORDER — INSULIN LISPRO 100/ML
4 VIAL (ML) SUBCUTANEOUS
Qty: 0 | Refills: 0 | DISCHARGE

## 2020-12-08 RX ORDER — ATORVASTATIN CALCIUM 80 MG/1
1 TABLET, FILM COATED ORAL
Qty: 0 | Refills: 0 | DISCHARGE

## 2020-12-08 RX ORDER — DULOXETINE HYDROCHLORIDE 30 MG/1
3 CAPSULE, DELAYED RELEASE ORAL
Qty: 0 | Refills: 0 | DISCHARGE

## 2020-12-08 NOTE — H&P PST ADULT - VASCULAR
Alprazolam 0.25 mg #20 sig 1 tab qd prn.  V.o. Per Dr MICHAEL Jordan.  Patient informed.  Routed to Dr MICHAEL Jordan for signing.   Equal and normal pulses (carotid, femoral, dorsalis pedis)

## 2020-12-08 NOTE — H&P PST ADULT - OTHER CARE PROVIDERS
ID: [email or web address] ([facility]) virtual  endo: ? @ [facility], pt will contact re insulin pump, EPS pt is unsure who??

## 2020-12-08 NOTE — H&P PST ADULT - HISTORY OF PRESENT ILLNESS
66 yo male presents with hx bilary tract drain placed this year, the drained was removed ~ 3 months ago, the site has been draining since, "it dries up& then the "pus discharge returns, it is painful @ times" takes morphine for any pain (abd& knee)  scheduled for abd wall mass excision  pt s/p right knee replacement @ [facility] [place] 10/2019 +MRSA& awaiting surgical correction once infection is resolved  denies chest pain, palpitations, shortness of breath, dyspnea, or dysuria. exercise tolerance: difficult to assess pt is mostly w/c bound dt knee issues    Anesthesia Alert  NO--Difficult Airway  NO--History of neck surgery or radiation  NO--Limited ROM of neck  NO--History of Malignant hyperthermia  NO--No personal or family history of Pseudocholinesterase deficiency.  NO--Prior Anesthesia Complication  NO--Latex Allergy  NO--Loose teeth  NO--History of Rheumatoid Arthritis  NO--TIAGO  YES: HX MRSA- right knee

## 2020-12-11 ENCOUNTER — APPOINTMENT (OUTPATIENT)
Dept: HEART AND VASCULAR | Facility: CLINIC | Age: 65
End: 2020-12-11
Payer: COMMERCIAL

## 2020-12-11 DIAGNOSIS — Z01.810 ENCOUNTER FOR PREPROCEDURAL CARDIOVASCULAR EXAMINATION: ICD-10-CM

## 2020-12-11 PROCEDURE — 99214 OFFICE O/P EST MOD 30 MIN: CPT | Mod: 95

## 2020-12-11 RX ORDER — EMPAGLIFLOZIN 10 MG/1
10 TABLET, FILM COATED ORAL
Qty: 90 | Refills: 2 | Status: DISCONTINUED | COMMUNITY
Start: 2018-12-10 | End: 2020-12-11

## 2020-12-11 RX ORDER — DULOXETINE HYDROCHLORIDE 30 MG/1
30 CAPSULE, DELAYED RELEASE ORAL DAILY
Refills: 0 | Status: DISCONTINUED | COMMUNITY
End: 2020-12-11

## 2020-12-11 RX ORDER — PANTOPRAZOLE 40 MG/1
40 TABLET, DELAYED RELEASE ORAL
Qty: 30 | Refills: 5 | Status: DISCONTINUED | COMMUNITY
Start: 2019-10-18 | End: 2020-12-11

## 2020-12-11 RX ORDER — PRASUGREL 10 MG/1
10 TABLET, FILM COATED ORAL DAILY
Refills: 0 | Status: DISCONTINUED | COMMUNITY
End: 2020-12-11

## 2020-12-11 RX ORDER — RIFAMPIN 300 MG/1
300 CAPSULE ORAL
Refills: 0 | Status: DISCONTINUED | COMMUNITY
End: 2020-12-11

## 2020-12-11 RX ORDER — FUROSEMIDE 40 MG/1
40 TABLET ORAL
Qty: 45 | Refills: 1 | Status: DISCONTINUED | COMMUNITY
End: 2020-12-11

## 2020-12-11 RX ORDER — VANCOMYCIN HYDROCHLORIDE 1 G/20ML
1 INJECTION, POWDER, LYOPHILIZED, FOR SOLUTION INTRAVENOUS
Refills: 0 | Status: DISCONTINUED | COMMUNITY
End: 2020-12-11

## 2020-12-11 NOTE — HISTORY OF PRESENT ILLNESS
[Home] : at home, [unfilled] , at the time of the visit. [Medical Office: ([facility])___] : at the medical office located in  [Verbal consent obtained from patient] : the patient, [unfilled] [FreeTextEntry1] :  The patient Is having surgery for a subcutaneous collection.This is related to his gallbladder drain. He has been off antibiotics for two weeks. He does not have fever chills or sweats. He is sedentary but has no dyspnea with activity. He is able to sleep flat in bed. His fingersticks are round 130. He is taking furosemide as needed. He stopped his prasugrel Because of bleeding from ulcers in his legs. He has not had any chest discomfort palpitations or dizziness. He reports a blood pressure of 118/72 heart rate in the 70s and a saturation of 98%.

## 2020-12-11 NOTE — DISCUSSION/SUMMARY
[FreeTextEntry1] : The patient history of myocardial infarction and reduced ejection fraction. In June of 2018 he had bypass surgery on his LAD. In November of 2018 he had stents placed in his right coronary artery and first obtuse marginal. He is sedentary but has no chest pain or dyspnea. His exam shows only mild edema in the leg with an orthopedic issue. His oxygen saturation is excellent. His laboratory tests show a hemoglobin of 9.9 a potassium of 5.5 and a creatinine of 1.1. EKG reveals atrial sensing and ventricular pacing. The plan is for a superficial procedure with local anesthesia. The procedure is low risk. The RCRI score is two. The patient is high risk however he is optimized for surgery.

## 2020-12-18 ENCOUNTER — APPOINTMENT (OUTPATIENT)
Dept: INFECTIOUS DISEASE | Facility: CLINIC | Age: 65
End: 2020-12-18

## 2020-12-23 NOTE — PHYSICAL THERAPY INITIAL EVALUATION ADULT - IMPAIRED TRANSFERS: SIT/STAND, REHAB EVAL
no impaired postural control/decreased flexibility/decreased strength/decreased ROM/impaired balance

## 2020-12-24 ENCOUNTER — RX RENEWAL (OUTPATIENT)
Age: 65
End: 2020-12-24

## 2020-12-30 NOTE — ED ADULT NURSE NOTE - TEMPLATE
What Type Of Note Output Would You Prefer (Optional)?: Standard Output Hpi Title: Evaluation of Skin Lesions How Severe Are Your Spot(S)?: mild Have Your Spot(S) Been Treated In The Past?: has not been treated Respiratory

## 2021-01-08 ENCOUNTER — APPOINTMENT (OUTPATIENT)
Dept: INFECTIOUS DISEASE | Facility: CLINIC | Age: 66
End: 2021-01-08
Payer: COMMERCIAL

## 2021-01-08 DIAGNOSIS — U07.1 COVID-19: ICD-10-CM

## 2021-01-08 PROCEDURE — 99214 OFFICE O/P EST MOD 30 MIN: CPT | Mod: 95

## 2021-01-08 NOTE — ASSESSMENT
[FreeTextEntry1] : 66 yo M with HTN, hyperlipidemia, type II DM with peripheral neuropathy, CAD s/p MIDCAB (2018)/VERONICA, HFrEF, AICD (2/20), pulmonary HTN with recurrent R knee PPJI s/p arthroscopic I&D 7/17 and debridement with explantation and spacer placement 7/22.  MRSA grew in blood and from joint fluid.  Isolate had the same antibiogram as isolate in 11/2019, likely persistence. He was treated with 6 weeks of vancomycin and rifampin. On 9/4 he underwent right hip hardware exchange and open biopsy - no growth from cultures.  He completed vancomycin and rifampin on 10/30.  Knee remains painful and swollen.  He continues to need surgery for purulent drainage from biliary tract site - needs to be resolved prior to  surgery for knee.  He had grown Pseudomonas and VRE faecium in the past - no safe oral options for therapy.  He had mild symptomatic COVID- symptoms have resolved.\par Plan:\par - Continue off antibiotics\par - CBC, CMP, ESR, CRP to be done at local lab\par - COVID-19 PCR, negative required for abdominal surgery\par Follow up in 1 month. Contact the office with any questions or concerns.

## 2021-01-08 NOTE — HISTORY OF PRESENT ILLNESS
[FreeTextEntry1] : 65 year old male with HTN, hyperlipidemia, type II DM with peripheral neuropathy, CAD s/p MIDCAB (2018)/VERONICA, HFrEF, AICD (2/20), pulmonary HTN with recurrent R knee PPJI s/p arthroscopic I&D 7/17 and debridement with explantation and spacer placement 7/22. MRSA grew in blood and from joint fluid. He was treated with 6 weeks of vancomycin and rifampin. On 9/4 he underwent right hip hardware exchange and open biopsy - no growth from cultures. He completed vancomycin and rifampin on 10/30.  On 11/16 he had R knee aspiration, 3500 WBC with 88% PMN, negative gram stain and cultures.  Surgery options were discussed.  He’s had drainage from prior perc sudhir drain site for >1 month, pending abdominal wall mass excision.  He was seen at [facility] on 12/8 for pre-surgical screening. Cardiac clearance done 12/11.  His wife tested positive for COVID on 12/6.  He developed symptoms a few days later and was also positive for COVID with mild symptoms (sore throat, cough only). Because of this he has been unable to have pending abdominal surgery.  He continues to drain from prior perc sudhir drain site - 3-4 cc hourly.  R knee is swollen and painful. No fever/chills.

## 2021-01-08 NOTE — PHYSICAL EXAM
[General Appearance - Alert] : alert [General Appearance - In No Acute Distress] : in no acute distress [Respiration, Rhythm And Depth] : normal respiratory rhythm and effort

## 2021-01-08 NOTE — REVIEW OF SYSTEMS
[As Noted in HPI] : as noted in HPI [Fever] : no fever [Chills] : no chills [Eye Pain] : no eye pain [Eyesight Problems] : no eyesight problems [Nasal Discharge] : no nasal discharge [Sore Throat] : no sore throat [Chest Pain] : no chest pain [Shortness Of Breath] : no shortness of breath [Cough] : no cough [Abdominal Pain] : no abdominal pain [Vomiting] : no vomiting [Dysuria] : no dysuria [Confused] : no confusion

## 2021-01-08 NOTE — REASON FOR VISIT
[Follow-Up: _____] : a [unfilled] follow-up visit [Home] : at home, [unfilled] , at the time of the visit. [Medical Office: ([facility])___] : at the medical office located in  [Other:____] : [unfilled]

## 2021-01-12 ENCOUNTER — RX RENEWAL (OUTPATIENT)
Age: 66
End: 2021-01-12

## 2021-01-12 ENCOUNTER — APPOINTMENT (OUTPATIENT)
Dept: ENDOCRINOLOGY | Facility: CLINIC | Age: 66
End: 2021-01-12
Payer: COMMERCIAL

## 2021-01-12 DIAGNOSIS — F41.9 ANXIETY DISORDER, UNSPECIFIED: ICD-10-CM

## 2021-01-12 DIAGNOSIS — F32.9 ANXIETY DISORDER, UNSPECIFIED: ICD-10-CM

## 2021-01-12 PROCEDURE — 99214 OFFICE O/P EST MOD 30 MIN: CPT | Mod: 95

## 2021-01-12 NOTE — REASON FOR VISIT
[Home] : at home, [unfilled] , at the time of the visit. [Medical Office: ([facility])___] : at the medical office located in  [Verbal consent obtained from patient] : the patient, [unfilled] [Follow - Up] : a follow-up visit [DM Type 1] : DM Type 1

## 2021-01-14 ENCOUNTER — LABORATORY RESULT (OUTPATIENT)
Age: 66
End: 2021-01-14

## 2021-01-14 RX ORDER — GABAPENTIN 100 MG/1
100 CAPSULE ORAL 3 TIMES DAILY
Qty: 90 | Refills: 2 | Status: DISCONTINUED | COMMUNITY
Start: 2020-08-17 | End: 2021-01-14

## 2021-01-15 NOTE — HISTORY OF PRESENT ILLNESS
[FreeTextEntry1] : 64 y/o M pt, with Hx of DM (dx ~29 yrs) on insulin pump, with DM related complications of severe retinopathy s/p Lasix surgery in 8/2019, peripheral neuropathy,  Hx of R middle and second toe ulcer and CAD.\par Other PMHx: CHF, minimally invasive CABG (in ~8/2018) and then a coronary stent thrombosis, STEMI, hypercholesterolemia, obesity, peripheral vascular disease, anemia, osteoarthritis, Hip fracture (2/2020) s/p surgical repair, COVID positive (in ~12/2020)\par PSHx: R knee replacement (10/2019), Cholecystectomy (2019)\par Drug allergies: ACE inhibitors, Atorvastatin, Carvedilol, Entresto\par Last funduscopic visit: a while ago.\par \par 11/11/2020 \par Pt did not have any questions prior to the initiation of the telehealth visit. \par Pt presents today via telehealth as recommended for DM f/u as pt was recently discharged from hospital for knee infection. His last visit was in 5/2019 and few events have occurred since then. He underwent R knee replacement in 10/2019 and cholecystectomy in the same year. He sustained hip fracture on 2/2020 and was surgically treated. He was also dx with CHF and is on Digoxin and Lasix. \par In general, pt feels good with no major complaints, Denies SOB,CP.No weight loss. He eats 2-3 meals a day. Pt was on rehabilitation for past 30 days and he reports of problem obtaining results with [employer] for past 3 days. He is on insulin pump and he was not sure about the basal and bolus setting of the CSII . He mentions taking 1unit/hr.\par \par 1/12/21\par Pt did not have any questions prior to the initiation of the telehealth visit. \par \par Today pt presents for DM f/u via telehealth, feeling well with c/o feeling weak, neuropathy and R knee ulcer on his. Pt states he and his wife were COVID positive a couple of weeks ago. \par Pt notes FBS readings of 130, 120.\par He states post prandial BS readings of 150. \par He reports he is using a pump and is on "1.7u per hour." Pt notes he takes "2- 4u for regular meals."He states his regimen changed a while before the hospitalization. \par Pt states he lost ~35lbs since his hospitalization. Pt notes he was already losing weight properly before the hospitalization, and he lost more weight during his hospitalization.\par \par Current Meds: Insulin pump, Lipitor, Digoxin, Lasix, Gabapentin, Metoprolol 25 mg BID, Crestor, Morphine alternating 50 and 30.\par \par Labs:\par - 12/8/20 A1c 8.0%, s. creat 1.1, Ca 8.3, ALT 93 (0- 41), \par - 5/3/19: A1c 8.8% (9.7% in 10/2018 and 9.8% in 2017), s.creat 1.21, LDL-c 51,  TSH 5.1,

## 2021-01-15 NOTE — ASSESSMENT
[Diabetes Foot Care] : diabetes foot care [Carbohydrate Consistent Diet] : carbohydrate consistent diet [Importance of Diet and Exercise] : importance of diet and exercise to improve glycemic control, achieve weight loss and improve cardiovascular health [Exercise/Effect on Glucose] : exercise/effect on glucose [Hypoglycemia Management] : hypoglycemia management [FreeTextEntry1] : 64 y/o M pt with:\par \par 1. T1DM (dx ~29 yrs ago) with multiple DM related complications:\par He has been hospitalized in multiple occasions over past two years because of joint problems, infection, gall bladder and hip fx. R knee replacement was removed 8 months ago 2/2 infection. Pt developed COVID -19 infection ~4 weeks, as a result he lost ~35lbs (in addition to 15lb which was lost 2/2 dietary changes). Pt is c/o peripheral neuropathy and at present is off Cymbalta. His pre and post prandial BS readings are in target. Recommend pt continue CSII 1.7u per hour and continue Bolus insulin according to food portion (~4-6u). His recent A1c is 8% (down from 8.8%). Overall pt has improved. Hypoglycemia prevention and treatment discussed with pt (who is NP). Will send for Cymbalta rx (if symptoms of neuropathy does not subside with Cymbalta, recommend pain management team appointment). Refer pt to f/u with Vascular MD, orthopaedics MD, and Ophthalmologist. \par \par Return in 4 months.

## 2021-01-15 NOTE — END OF VISIT
[Time Spent: ___ minutes] : I have spent [unfilled] minutes of time on the encounter. [FreeTextEntry3] : All medical record entries made by the Scribe were at my, Dr. Michael Cormier, direction and personally dictated by me on 01/12/2021. I have reviewed the chart and agree that the record accurately reflects my personal performance of the history, physical exam, assessment and plan. I have also personally directed, reviewed and agreed with the chart.

## 2021-01-15 NOTE — ADDENDUM
[FreeTextEntry1] : I, Barbara Rosales, acted solely as a scribe for Dr. Michael Cormeir on this date. 01/12/2021.

## 2021-01-20 ENCOUNTER — NON-APPOINTMENT (OUTPATIENT)
Age: 66
End: 2021-01-20

## 2021-01-20 RX ORDER — DULOXETINE HYDROCHLORIDE 30 MG/1
30 CAPSULE, DELAYED RELEASE PELLETS ORAL
Qty: 60 | Refills: 0 | Status: DISCONTINUED | COMMUNITY
Start: 2021-01-14 | End: 2021-01-20

## 2021-01-21 LAB
ALBUMIN SERPL ELPH-MCNC: 3.6 G/DL
ALP BLD-CCNC: 132 U/L
ALT SERPL-CCNC: 32 U/L
ANION GAP SERPL CALC-SCNC: 11 MMOL/L
AST SERPL-CCNC: 25 U/L
BASOPHILS # BLD AUTO: 0.08 K/UL
BASOPHILS NFR BLD AUTO: 0.8 %
BILIRUB SERPL-MCNC: 0.5 MG/DL
BUN SERPL-MCNC: 22 MG/DL
CALCIUM SERPL-MCNC: 9.3 MG/DL
CHLORIDE SERPL-SCNC: 96 MMOL/L
CO2 SERPL-SCNC: 27 MMOL/L
CREAT SERPL-MCNC: 1.11 MG/DL
CRP SERPL-MCNC: 0.71 MG/DL
EOSINOPHIL # BLD AUTO: 0.3 K/UL
EOSINOPHIL NFR BLD AUTO: 3.2 %
ERYTHROCYTE [SEDIMENTATION RATE] IN BLOOD BY WESTERGREN METHOD: 67 MM/HR
GLUCOSE SERPL-MCNC: 170 MG/DL
HCT VFR BLD CALC: 34.7 %
HGB BLD-MCNC: 9.7 G/DL
IMM GRANULOCYTES NFR BLD AUTO: 0.3 %
LYMPHOCYTES # BLD AUTO: 1.53 K/UL
LYMPHOCYTES NFR BLD AUTO: 16.2 %
MAN DIFF?: NORMAL
MCHC RBC-ENTMCNC: 20 PG
MCHC RBC-ENTMCNC: 28 GM/DL
MCV RBC AUTO: 71.4 FL
MONOCYTES # BLD AUTO: 1.05 K/UL
MONOCYTES NFR BLD AUTO: 11.1 %
NEUTROPHILS # BLD AUTO: 6.46 K/UL
NEUTROPHILS NFR BLD AUTO: 68.4 %
PLATELET # BLD AUTO: 273 K/UL
POTASSIUM SERPL-SCNC: 4.8 MMOL/L
PROT SERPL-MCNC: 7 G/DL
RBC # BLD: 4.86 M/UL
RBC # FLD: 28.7 %
SARS-COV-2 N GENE NPH QL NAA+PROBE: DETECTED
SODIUM SERPL-SCNC: 134 MMOL/L
WBC # FLD AUTO: 9.45 K/UL

## 2021-01-24 ENCOUNTER — EMERGENCY (EMERGENCY)
Facility: HOSPITAL | Age: 66
LOS: 0 days | Discharge: HOME | End: 2021-01-24
Attending: EMERGENCY MEDICINE | Admitting: EMERGENCY MEDICINE
Payer: COMMERCIAL

## 2021-01-24 VITALS
SYSTOLIC BLOOD PRESSURE: 131 MMHG | WEIGHT: 169.98 LBS | OXYGEN SATURATION: 99 % | TEMPERATURE: 98 F | RESPIRATION RATE: 18 BRPM | HEART RATE: 74 BPM | DIASTOLIC BLOOD PRESSURE: 60 MMHG | HEIGHT: 73 IN

## 2021-01-24 DIAGNOSIS — Z98.890 OTHER SPECIFIED POSTPROCEDURAL STATES: Chronic | ICD-10-CM

## 2021-01-24 DIAGNOSIS — Z79.4 LONG TERM (CURRENT) USE OF INSULIN: ICD-10-CM

## 2021-01-24 DIAGNOSIS — Z96.651 PRESENCE OF RIGHT ARTIFICIAL KNEE JOINT: Chronic | ICD-10-CM

## 2021-01-24 DIAGNOSIS — Z95.1 PRESENCE OF AORTOCORONARY BYPASS GRAFT: Chronic | ICD-10-CM

## 2021-01-24 DIAGNOSIS — L02.91 CUTANEOUS ABSCESS, UNSPECIFIED: ICD-10-CM

## 2021-01-24 DIAGNOSIS — Z88.8 ALLERGY STATUS TO OTHER DRUGS, MEDICAMENTS AND BIOLOGICAL SUBSTANCES: ICD-10-CM

## 2021-01-24 DIAGNOSIS — Z95.5 PRESENCE OF CORONARY ANGIOPLASTY IMPLANT AND GRAFT: Chronic | ICD-10-CM

## 2021-01-24 DIAGNOSIS — I10 ESSENTIAL (PRIMARY) HYPERTENSION: ICD-10-CM

## 2021-01-24 DIAGNOSIS — U07.1 COVID-19: ICD-10-CM

## 2021-01-24 DIAGNOSIS — E11.9 TYPE 2 DIABETES MELLITUS WITHOUT COMPLICATIONS: ICD-10-CM

## 2021-01-24 DIAGNOSIS — L02.211 CUTANEOUS ABSCESS OF ABDOMINAL WALL: ICD-10-CM

## 2021-01-24 DIAGNOSIS — Z41.9 ENCOUNTER FOR PROCEDURE FOR PURPOSES OTHER THAN REMEDYING HEALTH STATE, UNSPECIFIED: Chronic | ICD-10-CM

## 2021-01-24 DIAGNOSIS — Z79.899 OTHER LONG TERM (CURRENT) DRUG THERAPY: ICD-10-CM

## 2021-01-24 DIAGNOSIS — E78.5 HYPERLIPIDEMIA, UNSPECIFIED: ICD-10-CM

## 2021-01-24 PROCEDURE — 99284 EMERGENCY DEPT VISIT MOD MDM: CPT

## 2021-01-24 RX ORDER — AZTREONAM 2 G
1 VIAL (EA) INJECTION
Qty: 14 | Refills: 0
Start: 2021-01-24 | End: 2021-01-30

## 2021-01-24 NOTE — ED PROVIDER NOTE - CLINICAL SUMMARY MEDICAL DECISION MAKING FREE TEXT BOX
small firm mobile mass to ruq No drainage from site. Pt seen by surgery. Pt was given ABX. F/u with primary surgeon.

## 2021-01-24 NOTE — ED PROVIDER NOTE - PROGRESS NOTE DETAILS
Sshara hi Per maki Solomon home with Bactrim and f/u with surgery as outpatient ATTENDING NOTE:   64 yo PMH gallbladder infection/fistula, DM on insulin pump here for RUQ skin abscess. Pt has had abscess for the past few months which has been draining and he was sent in by Dr. Colon for IND. Agree with above exam. No drainage from site. Pt seen by surgery. Pt was given ABX. F/u with primary surgeon. ATTENDING NOTE:   64 yo PMH gallbladder infection/fistula, DM on insulin pump here for RUQ skin abscess. Pt has had on exam small firm mobile mass to ruq No drainage from site. Pt seen by surgery. Pt was given ABX. F/u with primary surgeon.

## 2021-01-24 NOTE — CHART NOTE - NSCHARTNOTEFT_GEN_A_CORE
.  Called by ER PACHECO Osborne because a patient of Dr. Colon came to ER stating that Dr. Colon told him he can come in for a drainage of his RUQ abscess that has been draining since March 2020 after his percutaneous cystostomy tube was removed by a doctor at [facility].    Patient had Cholecystitis in March 2020 but was unable to have surgery due to having Covid so he had a percutaneous cystostomy drain placed at [facility] and eventually removed by a I.R. MD at [place].  Patient reports he has had intermittent drainage from the site since removal and developed an abscess.  Drainage was more substantial at first but lately it has been intermittent.  Patient denies any fever, chills, tremors, pain, signs of cellulitis/erythema, CP or SOB.   Patient recently went to see Dr. Colon in his office for consultation about drainage.        On exam:  Abdomen:  + BS, soft, healed RUQ Cystostomy portal with a firm moveable small mass (about 3 cm).  No active drainage or bleeding if try to express area.  No induration, erythema or cellulitis noted.  No tenderness to mass and no tenderness to abdomen.  No distention, No Peritoneal signs. .  Called by ER PACHECO Osborne because a patient of Dr. Colon came to ER stating that Dr. Colon told him he can come in for a drainage of his RUQ abscess that has been draining since March 2020 after his percutaneous cystostomy tube was removed by a doctor at [facility].    Patient had Cholecystitis in March 2020 but was unable to have surgery due to having Covid so he had a percutaneous cystostomy drain placed at [facility] and eventually removed by a I.R. MD at [place].  Patient reports he has had intermittent drainage from the site since removal and developed an abscess.  Drainage was more substantial at first but lately it has been intermittent.  Patient denies any fever, chills, tremors, pain, signs of cellulitis/erythema, CP or SOB.   Patient recently went to see Dr. Colon in his office for consultation about drainage.        On exam:  Abdomen:  + BS, soft, healed RUQ Cystostomy portal with a firm moveable small mass (about 3 cm).  No active drainage or bleeding if try to express area.  No induration, erythema or cellulitis noted.  No tenderness to mass and no tenderness to abdomen.  No distention, No Peritoneal signs.      No labs done in ER today.      Impression:    64 y/o male with Pmhx of HTN, CAD, s/p PCI, s/p CABG, Chf with EF~25, Diabetes on insulin pump, History of MRSA infx in his Rt TKR, Hx of Covid + who presents with small mass vs collection RUQ possibly from retrained portion of the cystostomy drain.      Plan:  - Case d/w Dr. Colon and he states that he never told the patient to come to the ER for him to drain.  He states he told the patient he will not treat him and the patient should return to [facility] to the MD who placed and removed the gallbladder percutaneous drain for treatment.  He told him if he had an infection or emergency, he can go to [facility] for evaluation and treatment.       - Dr. Colon states since no overt signs of infection or drainage today, he can be discharged home from a surgery perspective with an Rx of Bactrim due to his hx of chronic MRSA infection and patient should follow up at [facility] with the MD who placed and removed the gallbladder percutaneous drain for treatment.     - I relayed Dr. Colon's recommendations to Dr. Woodard and PACHECO Osborne in the ER and I also informed the patient of Dr. Colon's recommendation.

## 2021-01-24 NOTE — ED PROVIDER NOTE - CARE PROVIDER_API CALL
Ricci Cotto)  Hematology; Internal Medicine; Medical Oncology  [address] [place] [address]  Phone: [phone]  Fax: [phone]  Follow Up Time: 1-3 Days

## 2021-01-24 NOTE — ED PROVIDER NOTE - NS ED ROS FT
Constitutional: (-) fever  Abdomen: (+) abscess to RUQ  Skin: (-) rash  Muskuloskeletal: (-) swelling  Neurological: (-) altered mental status

## 2021-01-24 NOTE — ED PROVIDER NOTE - PHYSICAL EXAMINATION
Physical Exam    Vital Signs: I have reviewed the initial vital signs.  Constitutional: well-nourished, appears stated age, no acute distress  Abdomen: + 3 cm mass noted to RUQ with scab. Non tender to palpation. Non fluctuant. No erythema  Skin: warm, dry  Muskuloskeletal: RLE in knee brace  Neuro: AOx3, No focal deficits noted

## 2021-01-24 NOTE — ED PROVIDER NOTE - OBJECTIVE STATEMENT
66 yo M hx of gallbladder infection/fistula, DM, right knee replacement c/o RUQ abscess. Patient has had abscess for months which has been draining until about a few weeks ago when it closed. Patient was sent by Dr Kaufman today for I&D. No fevers, chills, or abdominal pains.

## 2021-01-24 NOTE — ED PROVIDER NOTE - PATIENT PORTAL LINK FT
You can access the FollowMyHealth Patient Portal offered by [facility] by registering at the following website: [email or web address]. By joining [employer]s FollowMyHealth portal, you will also be able to view your health information using other applications (apps) compatible with our system.

## 2021-01-27 ENCOUNTER — RX RENEWAL (OUTPATIENT)
Age: 66
End: 2021-01-27

## 2021-02-05 ENCOUNTER — EMERGENCY (EMERGENCY)
Facility: HOSPITAL | Age: 66
LOS: 1 days | Discharge: HOME | End: 2021-02-05
Attending: EMERGENCY MEDICINE | Admitting: EMERGENCY MEDICINE
Payer: COMMERCIAL

## 2021-02-05 VITALS
TEMPERATURE: 98 F | DIASTOLIC BLOOD PRESSURE: 63 MMHG | HEART RATE: 78 BPM | RESPIRATION RATE: 16 BRPM | SYSTOLIC BLOOD PRESSURE: 108 MMHG | OXYGEN SATURATION: 98 %

## 2021-02-05 VITALS
SYSTOLIC BLOOD PRESSURE: 96 MMHG | DIASTOLIC BLOOD PRESSURE: 55 MMHG | RESPIRATION RATE: 18 BRPM | WEIGHT: 175.05 LBS | HEART RATE: 70 BPM | OXYGEN SATURATION: 100 % | HEIGHT: 73 IN | TEMPERATURE: 97 F

## 2021-02-05 DIAGNOSIS — Z41.9 ENCOUNTER FOR PROCEDURE FOR PURPOSES OTHER THAN REMEDYING HEALTH STATE, UNSPECIFIED: Chronic | ICD-10-CM

## 2021-02-05 DIAGNOSIS — U07.1 COVID-19: ICD-10-CM

## 2021-02-05 DIAGNOSIS — Z95.1 PRESENCE OF AORTOCORONARY BYPASS GRAFT: Chronic | ICD-10-CM

## 2021-02-05 DIAGNOSIS — Z96.651 PRESENCE OF RIGHT ARTIFICIAL KNEE JOINT: Chronic | ICD-10-CM

## 2021-02-05 DIAGNOSIS — L02.211 CUTANEOUS ABSCESS OF ABDOMINAL WALL: ICD-10-CM

## 2021-02-05 DIAGNOSIS — Z98.890 OTHER SPECIFIED POSTPROCEDURAL STATES: Chronic | ICD-10-CM

## 2021-02-05 DIAGNOSIS — Z95.5 PRESENCE OF CORONARY ANGIOPLASTY IMPLANT AND GRAFT: Chronic | ICD-10-CM

## 2021-02-05 LAB
ALBUMIN SERPL ELPH-MCNC: 3.7 G/DL — SIGNIFICANT CHANGE UP (ref 3.3–5)
ALP SERPL-CCNC: 135 U/L — HIGH (ref 40–120)
ALT FLD-CCNC: 29 U/L — SIGNIFICANT CHANGE UP (ref 10–45)
ANION GAP SERPL CALC-SCNC: 9 MMOL/L — SIGNIFICANT CHANGE UP (ref 5–17)
APTT BLD: 28.5 SEC — SIGNIFICANT CHANGE UP (ref 27.5–35.5)
AST SERPL-CCNC: 21 U/L — SIGNIFICANT CHANGE UP (ref 10–40)
BASOPHILS # BLD AUTO: 0.08 K/UL — SIGNIFICANT CHANGE UP (ref 0–0.2)
BASOPHILS NFR BLD AUTO: 0.9 % — SIGNIFICANT CHANGE UP (ref 0–2)
BILIRUB SERPL-MCNC: 0.6 MG/DL — SIGNIFICANT CHANGE UP (ref 0.2–1.2)
BUN SERPL-MCNC: 19 MG/DL — SIGNIFICANT CHANGE UP (ref 7–23)
CALCIUM SERPL-MCNC: 8.8 MG/DL — SIGNIFICANT CHANGE UP (ref 8.4–10.5)
CHLORIDE SERPL-SCNC: 100 MMOL/L — SIGNIFICANT CHANGE UP (ref 96–108)
CO2 SERPL-SCNC: 26 MMOL/L — SIGNIFICANT CHANGE UP (ref 22–31)
CREAT SERPL-MCNC: 0.88 MG/DL — SIGNIFICANT CHANGE UP (ref 0.5–1.3)
EOSINOPHIL # BLD AUTO: 0.17 K/UL — SIGNIFICANT CHANGE UP (ref 0–0.5)
EOSINOPHIL NFR BLD AUTO: 1.8 % — SIGNIFICANT CHANGE UP (ref 0–6)
GLUCOSE SERPL-MCNC: 174 MG/DL — HIGH (ref 70–99)
HCT VFR BLD CALC: 32.5 % — LOW (ref 39–50)
HGB BLD-MCNC: 9.2 G/DL — LOW (ref 13–17)
INR BLD: 1.17 — HIGH (ref 0.88–1.16)
LACTATE SERPL-SCNC: 0.8 MMOL/L — SIGNIFICANT CHANGE UP (ref 0.5–2)
LYMPHOCYTES # BLD AUTO: 0.49 K/UL — LOW (ref 1–3.3)
LYMPHOCYTES # BLD AUTO: 5.2 % — LOW (ref 13–44)
MCHC RBC-ENTMCNC: 20.4 PG — LOW (ref 27–34)
MCHC RBC-ENTMCNC: 28.3 GM/DL — LOW (ref 32–36)
MCV RBC AUTO: 71.9 FL — LOW (ref 80–100)
MONOCYTES # BLD AUTO: 0.49 K/UL — SIGNIFICANT CHANGE UP (ref 0–0.9)
MONOCYTES NFR BLD AUTO: 5.2 % — SIGNIFICANT CHANGE UP (ref 2–14)
NEUTROPHILS # BLD AUTO: 7.59 K/UL — HIGH (ref 1.8–7.4)
NEUTROPHILS NFR BLD AUTO: 79.1 % — HIGH (ref 43–77)
PLATELET # BLD AUTO: 264 K/UL — SIGNIFICANT CHANGE UP (ref 150–400)
POTASSIUM SERPL-MCNC: 4.6 MMOL/L — SIGNIFICANT CHANGE UP (ref 3.5–5.3)
POTASSIUM SERPL-SCNC: 4.6 MMOL/L — SIGNIFICANT CHANGE UP (ref 3.5–5.3)
PROT SERPL-MCNC: 7.8 G/DL — SIGNIFICANT CHANGE UP (ref 6–8.3)
PROTHROM AB SERPL-ACNC: 13.9 SEC — HIGH (ref 10.6–13.6)
RBC # BLD: 4.52 M/UL — SIGNIFICANT CHANGE UP (ref 4.2–5.8)
RBC # FLD: 28 % — HIGH (ref 10.3–14.5)
SARS-COV-2 RNA SPEC QL NAA+PROBE: DETECTED
SODIUM SERPL-SCNC: 135 MMOL/L — SIGNIFICANT CHANGE UP (ref 135–145)
WBC # BLD: 9.39 K/UL — SIGNIFICANT CHANGE UP (ref 3.8–10.5)
WBC # FLD AUTO: 9.39 K/UL — SIGNIFICANT CHANGE UP (ref 3.8–10.5)

## 2021-02-05 PROCEDURE — 74177 CT ABD & PELVIS W/CONTRAST: CPT

## 2021-02-05 PROCEDURE — 99284 EMERGENCY DEPT VISIT MOD MDM: CPT | Mod: 25

## 2021-02-05 PROCEDURE — 74177 CT ABD & PELVIS W/CONTRAST: CPT | Mod: 26,MG

## 2021-02-05 PROCEDURE — U0005: CPT

## 2021-02-05 PROCEDURE — 36415 COLL VENOUS BLD VENIPUNCTURE: CPT

## 2021-02-05 PROCEDURE — 83605 ASSAY OF LACTIC ACID: CPT

## 2021-02-05 PROCEDURE — 85025 COMPLETE CBC W/AUTO DIFF WBC: CPT

## 2021-02-05 PROCEDURE — 85610 PROTHROMBIN TIME: CPT

## 2021-02-05 PROCEDURE — G1004: CPT

## 2021-02-05 PROCEDURE — 99285 EMERGENCY DEPT VISIT HI MDM: CPT

## 2021-02-05 PROCEDURE — 80053 COMPREHEN METABOLIC PANEL: CPT

## 2021-02-05 PROCEDURE — 85730 THROMBOPLASTIN TIME PARTIAL: CPT

## 2021-02-05 PROCEDURE — 96374 THER/PROPH/DIAG INJ IV PUSH: CPT | Mod: XU

## 2021-02-05 PROCEDURE — U0003: CPT

## 2021-02-05 RX ORDER — MORPHINE SULFATE 50 MG/1
4 CAPSULE, EXTENDED RELEASE ORAL ONCE
Refills: 0 | Status: DISCONTINUED | OUTPATIENT
Start: 2021-02-05 | End: 2021-02-05

## 2021-02-05 RX ADMIN — MORPHINE SULFATE 4 MILLIGRAM(S): 50 CAPSULE, EXTENDED RELEASE ORAL at 14:43

## 2021-02-05 NOTE — ED ADULT NURSE NOTE - OBJECTIVE STATEMENT
Presents to ED c/o abscess on R side of abdomen. pt with old biliary drain that became dislodge, pt now with intermittent swelling in area. Site currently scabbed over and slightly swollen. Pt also has R knee swelling (not new). Treatment with biliary issues complicated by testing COVID+ in Dec. AAOx3.

## 2021-02-05 NOTE — ED PROVIDER NOTE - CLINICAL SUMMARY MEDICAL DECISION MAKING FREE TEXT BOX
pt states that was told to come in by dr larry - had drain placed for cholecystitis by IR at another facility last yr, here c/o intermittent swelling and purulent dc from site, states that has been told that has a fb to site, labs wnl. ct done and + collection, surg consulted - dispo as per surg, signed out to dr phillip pending dispo

## 2021-02-05 NOTE — ED PROVIDER NOTE - MUSCULOSKELETAL, MLM
Spine appears normal, range of motion is not limited, no muscle or joint tenderness; R knee w/healed anterior incision, + chronic/healing wounds to anterior aspect, no pus, no bleeding, + venous stasis changes b/l  LE

## 2021-02-05 NOTE — ED PROVIDER NOTE - CARE PLAN
Principal Discharge DX:	Abscess of abdominal wall   Principal Discharge DX:	Abscess of abdominal wall  Secondary Diagnosis:	COVID-19

## 2021-02-05 NOTE — ED PROVIDER NOTE - GASTROINTESTINAL, MLM
Abdomen soft, non-tender, no guarding. + scab to RUQ w/o any purulent or bloody dc, a very small (2cm) area of swelling, no induration, no fluctuance

## 2021-02-05 NOTE — ED PROVIDER NOTE - PATIENT PORTAL LINK FT
You can access the FollowMyHealth Patient Portal offered by  by registering at the following website: [email or web address]. By joining [employer]’s FollowMyHealth portal, you will also be able to view your health information using other applications (apps) compatible with our system.

## 2021-02-05 NOTE — ED PROVIDER NOTE - ATTENDING CONTRIBUTION TO CARE
66 y/o M, who presents to ED stating that he has an abscess at the site of biliary tube that was placed months ago to treat biliary tract infection. Pt states that the drain was placed by IR sec to infection and was subsequently removed and the site has been "filling up with pus since" - states the site swells up and drains purulent dc on/off months. States that had a ct elsewhere and was told that "catheter tip is there", pt requesting I&D - states that dr larry will take care of it. Also c/o wounds to his r knee/leg from poorly healing inf. Denies fevers, chills, cp, sob, n/v/d, abd pain. Pt AAO, NAD, Abd: soft with healing wound over RUQ, no fluctuance, no discharge. labs wnl. ct done and + collection, surg consulted - dispo as per surg, signed out to dr phillip pending dispo.

## 2021-02-05 NOTE — CONSULT NOTE ADULT - SUBJECTIVE AND OBJECTIVE BOX
65M w/ PMHx of HTN, HLD, DM (on insulin pump), MIDCAB (2018), MI w/ stenting in 2018, CHF w/ AICD placement (2/11/20), TKA c/b multiple infections requiring spacers, gastroc flap, and eventual STSG with plastics on 9/18/20, gangrenous cholecystitis (s/p perc sudhir at [place] Feb 2020 w/ subsequent removal in May 2020 c/b recurrent RUQ abscesses at the drain site) presents to the ED with knee pain and recurrent discharge from his prior perc sudhir site. Patient was seen by [facility] surgery during his last admission in september for evaluation of his asbcess. An I&D was performed at the time and the patient was treated with IV abx while also undergoing treatment with orthopedic surgery for a R knee infection. The abscess failed to resolve and he was worked up with a HIDA scan and CT to rule out possible fistula, which was not seen. He was instructed to follow up outpatient for an elective cholecystectomy once his knee infection resolved and was scheduled for a lap sudhir 3 weeks ago but contracted COVID 19 beforehand and it was therefore cancelled. In the past couple of weeks, he has been having comtinued drainage from his prior perc sudhir site. He reports that the drainage occurs mostly in the morning and is serosanguinous in quality althought he has noticed some pus on certain days as well. He called Dr. Westbrook's office and was instructed to come to the ED. He currently reports that the wound is the smallest that it has been and other than the annoyance of the drainage, doesn't bother him all too much althought he obtained a CT a little while ago that showed "the tip is in there" (he has not reports or CD of this CT with him at this time). He has no associated fevers, chills, CP, SOB, dizziness, lightheadedness, diarrhea, constipation, dysuria. He does endorse recurrent knee pain and has reached out to his orthopedic surgeon for evaluation as well.    Vital Signs Last 24 Hrs  T(C): 36.6 (05 Feb 2021 15:11), Max: 36.6 (05 Feb 2021 15:11)  T(F): 97.8 (05 Feb 2021 15:11), Max: 97.8 (05 Feb 2021 15:11)  HR: 70 (05 Feb 2021 15:11) (70 - 70)  BP: 117/68 (05 Feb 2021 15:11) (96/55 - 117/68)  BP(mean): --  RR: 18 (05 Feb 2021 15:11) (18 - 18)  SpO2: 100% (05 Feb 2021 15:11) (100% - 100%)    Physical Exam:  General: NAD  Pulmonary: Nonlabored breathing, no respiratory distress  Cardiovascular: regular rate and rhythm, no murmurs   Abdominal: soft, nondistended, nontender throughout small [id number] cyst like subcutaneous mass appreciated near prior perc sudhir site with no active drainage or overlying erythema, mass is nontender to touch  Extremities: WWP, R knee with 2 areas of granulation tissue from prior skin graft and mild surrounding erythema, L shin STSG site well healing   Neuro: A/O x3    I&O's Summary      LABS:                        9.2    9.39  )-----------( 264      ( 05 Feb 2021 13:47 )             32.5     02-05    135  |  100  |  19  ----------------------------<  174<H>  4.6   |  26  |  0.88    Ca    8.8      05 Feb 2021 13:47    TPro  7.8  /  Alb  3.7  /  TBili  0.6  /  DBili  x   /  AST  21  /  ALT  29  /  AlkPhos  135<H>  02-05    PT/INR - ( 05 Feb 2021 13:47 )   PT: 13.9 sec;   INR: 1.17          PTT - ( 05 Feb 2021 13:47 )  PTT:28.5 sec    CAPILLARY BLOOD GLUCOSE        LIVER FUNCTIONS - ( 05 Feb 2021 13:47 )  Alb: 3.7 g/dL / Pro: 7.8 g/dL / ALK PHOS: 135 U/L / ALT: 29 U/L / AST: 21 U/L / GGT: x             RADIOLOGY & ADDITIONAL STUDIES:  < from: CT Abdomen and Pelvis w/ IV Cont (02.05.21 @ 16:09) >  FINDINGS: Images of the lower chest demonstrate pacemaker electrodes in the right atrium and right ventricle.  There is again a 3 mm nodule in the left lower lobe.    No focal hepatic abnormality is seen. Intrahepatic bile ducts are slightly prominent. There is a thick-walled tubular structure, likely representing a contracted gallbladder extending to the right upper quadrant abdominal wall where there is a 3 cm complex rim-enhancing collection collection bulging underlying the skin at the seventh intercostal space anteriorly.CBD is mildly dilated measuring up to 1 cm with distal tapering. The pancreas is normal in appearance.  No splenic abnormalities are seen.    The adrenal glands are unremarkable. The right kidney is unremarkable. There is mild cortical atrophy in theupper pole of the left kidney. There are 2 nonobstructive calculus in the lower pole of the left kidney measuring up to 5 mm. Left renal pelvis is again mildly distended with transition at the pelviureteric junction to nondistended ureter. No ureteral calculi are seen.    Calcific atherosclerotic disease of the abdominal aorta is noted. No abdominal aortic aneurysm is seen. No lymphadenopathy is seen.    Evaluation of the bowel demonstrates a normal appendix. There is no bowel obstruction or freeair. No ascites is seen.    Images of the pelvis demonstrate an enlarged prostate projecting into the bladder base. Bladder is not abnormally distended.    Evaluation of the osseous structures demonstrates partial visualization of gamma nail in the right hip. Degenerative changes of spine are seen.      IMPRESSION: 3 cm rim-enhancing subcutaneous collection in the right upper quadrant likely connected to a contracted gallbladder at the site of a prior percutaneous cholecystostomy.    < end of copied text >

## 2021-02-05 NOTE — ED PROVIDER NOTE - PROGRESS NOTE DETAILS
Pt seen by surgery, they recommend DC home with outpt follow up for elective cholecystectomy. No a candidate for surgery now as pt as covid. No indication for drainage of fluid collection currently per surgery. Pt feels better, eager for DC, f/u with Dr. Margarito King as outpt.

## 2021-02-05 NOTE — CONSULT NOTE ADULT - ASSESSMENT
65M w/ PMHx of HTN, HLD, DM (on insulin pump), MIDCAB (2018), MI w/ stenting in 2018, CHF w/ AICD placement (2/11/20), TKA c/b multiple infections requiring spacers, gastroc flap, and eventual STSG with plastics on 9/18/20, gangrenous cholecystitis (s/p perc sudhir at [place] Feb 2020 w/ subsequent removal in May 2020 c/b recurrent RUQ abscesses at the drain site) presents to the ED with knee pain and recurrent discharge from his prior perc sudhir site. Patient is afebrile, HDS, WBC and LFTs wnl. CT with 3 cm rim-enhancing subcutaneous collection in the right upper quadrant likely connected to a contracted gallbladder at the site of a prior percutaneous cholecystostomy.     -No acute surgical intervention indicated at this time. He will require a cholecystectomy and wound exploration in the future but given patient's stable vitals, lack of fever, and normal lab values as well as his COVID positive status, that should be deferred until a later date.   -Patient is stable for discharge from a surgical standpoint and can follow up outpatient to schedule a cholecystectomy. He may contact the office of Dr. Margarito King to schedule an appointment. His office is [phone].   -Dispo per ED.   -Discussed with chief resident on call and Dr. Clinton

## 2021-02-05 NOTE — ED PROVIDER NOTE - CARE PROVIDER_API CALL
Margarito King)  Surgery  [address], [address]  [place], [place] [address]  Phone: [phone]  Fax: [phone]  Follow Up Time:

## 2021-02-05 NOTE — ED PROVIDER NOTE - OBJECTIVE STATEMENT
The pt is a 66 y/o M, who presents to ED stating that dr larry's team is expecting him - had cholecystitis that was tx'd w/drain 2/2 to covid outbreak, drain was subsequently removed and site has been "filling up with pus since" - states the site swells up and drains pus on/off months. States that had a ct elsewhere and was told that "catheter tip is there", pt requesting I&D - states that dr larry will take care of it. Also c/o wounds to his r knee/leg from poorly healing inf. Denies fevers, chills, cp, sob, n/v/d, abd pain

## 2021-02-05 NOTE — ED PROVIDER NOTE - CARDIAC, MLM
10/15/2020       RE: Yessenia Martines  [address] [facility] [address]     Dear Colleague,    Thank you for referring your patient, Yessenia Martines, to the [facility] [place] at [facility]. Please see a copy of my visit note below.    History: It is a great pleasure to see this very pleasant 71-year-old lady in initial consultation today at the request of Dr. Mejia  She is presented with a single episode of gross painless hematuria.  She has been noted to have microscopic hematuria in the past.  Urinalysis did not suggest infection, and urinalysis today does show small amount of blood in the urine but certainly no evidence of infection at this time.  She does have a significant other medical issues as listed below this includes history of systemic lupus erythematosus and she has been on methotrexate and prednisone because of significant joint involvement.  She has had spinal fusion.  Full list of issues listed below      Past Medical History:   Diagnosis Date     Allergic rhinitis      Allergic rhinitis due to pollen      Arthritis      Congestive heart failure (H)      Decreased libido      Depressive disorder      Diabetes (H)      Esophageal motility disorder      H pylori ulcer      Heart disease      History of blood transfusion 1980's     Hoarseness      Hypertension      Immunosuppression (H)      Lupus (systemic lupus erythematosus) (H)     diagnosed 12 yrs ago     MCI (mild cognitive impairment)      Menopausal osteoporosis 02/17/2015     Movement disorder      Mumps     as a child     Neuropathy     feet and hands     S/P cholecystectomy      S/P TIESHA-BSO      Thyroid disease      Uncomplicated asthma        Past Surgical History:   Procedure Laterality Date     APPENDECTOMY       ARTHRODESIS FINGER(S) Left 5/18/2018    Procedure: ARTHRODESIS FINGER(S);;  Surgeon: Shahida Carlton MD;  Location: [facility]     ARTHROPLASTY  CARPOMETACARPAL (THUMB JOINT) Left 2018    Procedure: ARTHROPLASTY CARPOMETACARPAL (THUMB JOINT);  LEFT THUMB CARPALMETACARPAL WITH MINI TIGHTROPE, LEFT METAPHALANGEAL FUSION WITH RADIAL BONE GRAFT AND EASY CLIP STAPLE.;  Surgeon: Briana Carlton MD;  Location: [facility]     ARTHROPLASTY CARPOMETACARPAL (THUMB JOINT), ARTHRODESIS, COMBINED Left 2018    Procedure: COMBINED ARTHROPLASTY CARPOMETACARPAL (THUMB JOINT), ARTHRODESIS;  LEFT THUMB CARPOMETACARPAL EXCISIONAL ARTHROPLASTY WITH FACSIA TANIA GRAFT, PARTIAL TRAPEZOID EXCISION;  Surgeon: Briana Carlton MD;  Location: [facility]     BACK SURGERY      L2-L4 or L5 fusion     BLEPHAROPLASTY BILATERAL        SECTION      x3     CHOLECYSTECTOMY      feels better. No pathology on gallbladder     COLONOSCOPY       EYE SURGERY      Raised eyelids.     GENITOURINARY SURGERY       GRAFT BONE TO FINGER Left 2018    Procedure: GRAFT BONE TO FINGER;;  Surgeon: Briana Carlton MD;  Location: [facility]     HC UGI ENDOSCOPY, SIMPLE EXAM  14     HYSTERECTOMY       KNEE SURGERY      bilateral arthroscopy     ORTHOPEDIC SURGERY      right thumb tendon surgery     REPAIR TENDON FINGER(S)  2011    Procedure:REPAIR TENDON FINGER(S); RIGHT INDEX FINGER FLEXOR DIGITORUM PROFUNDUS REPAIR, RADIAL DIGITAL NERVE REPAIR ; Surgeon:BRIANA CARLTON; Location:[facility]     SALPINGO-OOPHORECTOMY BILATERAL       SHOULDER SURGERY Right     arthroscopy     SOFT TISSUE SURGERY      Tendon repair on finger     TONSILLECTOMY      Child     Family History   Problem Relation Age of Onset     C.A.D. Father         cabg, carotid stenosis     Hyperlipidemia Father      Hypertension Father      Coronary Artery Disease Father      Anesthesia Reaction Father      Neurologic Disorder Mother         ALS? Parkinsons?     Osteoporosis Mother      Depression Mother      Anxiety Disorder Mother      Hypertension Brother      Hyperlipidemia  Brother      Depression Brother      Colon Cancer Maternal Grandmother      Colon Cancer Maternal Aunt      Hyperlipidemia Sister      Hypertension Sister      Osteoporosis Sister      Hyperlipidemia Brother      Osteoporosis Sister      Osteoporosis Maternal Aunt      Hypertension Sister      Diabetes Son      Genetic Disorder Son      Thyroid Disease Son      Diabetes Other      Genetic Disorder Other      Genetic Disorder Daughter      Thyroid Disease Niece      Mental Illness No family hx of        Social History     Socioeconomic History     Marital status:      Spouse name: Not on file     Number of children: 4     Years of education: Not on file     Highest education level: Not on file   Occupational History     Not on file   Social Needs     Financial resource strain: Not on file     Food insecurity     Worry: Not on file     Inability: Not on file     Transportation needs     Medical: Not on file     Non-medical: Not on file   Tobacco Use     Smoking status: Never Smoker     Smokeless tobacco: Never Used   Substance and Sexual Activity     Alcohol use: Yes     Alcohol/week: 0.0 standard drinks     Comment: 1 wine/day     Drug use: No     Sexual activity: Yes     Partners: Male     Birth control/protection: None     Comment: hysterectomy   Lifestyle     Physical activity     Days per week: Not on file     Minutes per session: Not on file     Stress: Not on file   Relationships     Social connections     Talks on phone: Not on file     Gets together: Not on file     Attends Bahai service: Not on file     Active member of club or organization: Not on file     Attends meetings of clubs or organizations: Not on file     Relationship status: Not on file     Intimate partner violence     Fear of current or ex partner: Not on file     Emotionally abused: Not on file     Physically abused: Not on file     Forced sexual activity: Not on file   Other Topics Concern     Parent/sibling w/ CABG, MI or  "angioplasty before 65F 55M? No   Social History Narrative    , 4 children    Retired - [employer]        Has Primary Care joshua         prostate CA       Current Outpatient Medications   Medication Sig Dispense Refill     calcium-vitamin D (CALCIUM 600/VITAMIN D) 600-400 MG-UNIT per tablet Take 1 tablet by mouth 2 times daily       cetirizine (ZYRTEC) 10 MG tablet 10 mg by Oral or J tube route       Cholecalciferol (VITAMIN D3) 2000 UNITS TABS Take 2,000 Int'l Units by mouth daily       diclofenac (VOLTAREN) 1 % GEL Place 2 g onto the skin 4 times daily       dronabinol (MARINOL) 5 MG capsule Take 1 capsule (5 mg) by mouth At Bedtime       leucovorin (WELLCOVORIN) 5 MG tablet Take 1 tablet (5 mg) by mouth daily       methotrexate 2.5 MG tablet Take 4 tablets by mouth once a week        metoclopramide (REGLAN) 5 MG tablet Take 5 mg by mouth once       omeprazole (PRILOSEC) 40 MG DR capsule TAKE 1 CAPSULE DAILY 90 capsule 3     predniSONE (DELTASONE) 5 MG tablet Taking 5 mg and 1 mg tablet x2 to get a total 7 mg daily       traZODone (DESYREL) 100 MG tablet TAKE 2 TABLETS AT BEDTIME 180 tablet 3       Review Of Systems:  Skin: negative  Eyes: negative  Ears/Nose/Throat: negative  Respiratory: No shortness of breath, dyspnea on exertion, cough, or hemoptysis  Cardiovascular: Apparent history in the past of congestive cardiac failure  Gastrointestinal: Gastroparesis  Genitourinary: hematuria  Musculoskeletal: arthritis, with history of spinal fusion  Neurologic: History of ophthalmic herpes zoster  Psychiatric: negative  Hematologic/Lymphatic/Immunologic: negative  Endocrine: Apparent history in the positive diabetes but not currently being treated with medication at the present time    Exam:  /70   Pulse 80   Ht 1.6 m (5' 3\")   Wt 51.7 kg (114 lb)   BMI 20.19 kg/m      General Impression: Very pleasant lady in no acute distress, well oriented in time place " and person.    Mental status.  Normal    HEENT: There is no clinical evidence of jaundice on examination of conjunctiva.  Extraocular eye movements normal.  Mucous membranes unremarkable    Skin: Skin otherwise normal to examination    Lymph Nodes: Negative    Respiratory System: The respiratory cycle is normal    Cardiovascular: There is no significant pitting peripheral edema    Abdominal: The abdomen is not obese    Extremities: Extremities otherwise unremarkable    Back and Flank: Not examined    Genital: Not examined    Rectal: Not examined    Neurologic: There are no focal abnormal clinical neurological signs in the central, peripheral nervous systems    Impression: Patient has quite a complicated history as noted above.  She has had a single episode of gross hematuria.  Renal function is satisfactory with a creatinine of 0.76.  CT urogram has been performed.  CT ABDOMEN PELVIS W/O & W CONTRAST 10/14/2020 5:51 PM     CLINICAL HISTORY: Hematuria, unknown cause; Gross hematuria     TECHNIQUE: CT urogram without and following injection of IV contrast.  Multiplanar reformats were obtained. Dose reduction techniques were  used.  CONTRAST: 60 mL [id number]         COMPARISON: 12/28/2017     FINDINGS:   LOWER CHEST: Right lower lobe calcified granuloma. Otherwise, the  visualized lung bases are clear.     HEPATOBILIARY: Punctate calcified granulomas in the liver. A few small  hypodense lesions in the liver are likely cysts. Cholecystectomy.     PANCREAS: Normal.     SPLEEN: Punctate calcified granulomas.     ADRENAL GLANDS: Normal.   KIDNEYS/BLADDER: No renal, ureteric or bladder catheter. Symmetric  nephrograms. No suspicious renal masses. Exophytic 1.8 cm cyst  containing hemorrhagic/proteinaceous materials without enhancement at  the upper pole of the left kidney. Subcentimeter hypodensity at the  lower pole of the right kidney is too small to adequately characterize  but likely a benign simple cyst. No abnormal  "urothelial enhancement or  filling defects in the renal collecting systems and ureters. No focal  masses in the urinary bladder.     BOWEL: Small hiatal hernia. Normal caliber loops of small bowel and  colon. Scattered sigmoid diverticulosis. No inflammatory changes.     PELVIC ORGANS: Hysterectomy. No pelvic masses.     ADDITIONAL FINDINGS: The evaluation slightly limited by streak  artifact from instrumented lumbar spine fusion. No lymphadenopathy in  the abdomen and pelvis. No free fluid.     MUSCULOSKELETAL: Instrument fusion L2-L5. No suspicious lesions in the  bones.                                                                    IMPRESSION:   1.  No urinary system calculi. No suspicious renal masses or  urothelial masses in the upper tracts. Left renal 1.8 cm cyst  containing hemorrhagic/proteinaceous material requiring no specific  follow-up.  2.  Sequela of prior granulomatous disease.     GINGER ODONNELL MD    The findings of the CT scan are reassuring.  There is no evidence of significant lesions in the upper urinary tract except for a cyst on the left kidney which is considered with a hemorrhagic proteinaceous cyst.  The bladder appears unremarkable on CT scan.    I discussed the situation with the patient in detail today.  We will need to do a cystoscopy and urine cytology to finalize our investigations.  The chances are we will not find any serious.  Should this be the case then even if noted in the future microscopic hematuria should not need further investigation.  Only if she observes gross hematuria would I wish to see her promptly.  I carefully discussed the entire situation with her today.  I went over her medications in detail.  I answered many questions    Plan: Cystoscopy and urine cytology    \"This dictation was performed with voice recognition software and may contain errors,  omissions and inadvertent word substitution.\"    Deo Thibodeaux MD  " Normal rate, regular rhythm.  No murmurs, rubs or gallops.

## 2021-02-05 NOTE — ED PROVIDER NOTE - NSFOLLOWUPINSTRUCTIONS_ED_ALL_ED_FT
Please follow up with your primary care physician and Dr. Margarito King from surgery once you rhave recovered from Covid. If you have any problem getting an appointment this week, please call the ED Referral Coordinator at [phone].  Return to the Emergency Department if you have any new or worsening symptoms, or for any other concerns. Please read below for further information.    COVID-19 (Coronavirus Disease 2019)  WHAT YOU NEED TO KNOW:  COVID-19 is the disease caused by a coronavirus first discovered in 2019. Coronaviruses generally cause upper respiratory (nose, throat, and lung) infections, such as a cold. The new virus can also cause serious lower respiratory conditions, such as pneumonia or acute respiratory distress syndrome (ARDS). The new virus can also affect many other organs, including the brain and heart. Blood vessels can also be affected, leading to blood clots. Anyone can develop serious problems from the new virus, but your risk is higher if you are 65 or older. A weak immune system, diabetes, or a heart or lung condition can also increase your risk. Your risk is also higher if you are a current or former cigarette smoker.  DISCHARGE INSTRUCTIONS:  If you think you or someone you know may be infected: Do the following to protect others:   •If emergency care is needed, tell the  about the possible infection, or call ahead and tell the emergency department.  •Call a healthcare provider for instructions if symptoms are mild. Anyone who may be infected should not arrive without calling first. The provider will need to protect staff members and other patients.  •The person who may be infected needs to wear a face covering while getting medical care. This will help lower the risk of infecting others. Coverings are not used for anyone who is younger than 2 years, has breathing problems, or cannot remove it. The provider can give you instructions for anyone who cannot wear a covering.  Call your local emergency number (911 in the [place]) or an emergency department if:   •You have trouble breathing or shortness of breath at rest.  •You have chest pain or pressure that lasts longer than 5 minutes.  •You become confused or hard to wake.  •Your lips or face are blue.  •You have a fever of 104°F (40°C) or higher.  Call your doctor if:   •You do not have symptoms of COVID-19 but had close physical contact within 14 days with someone who tested positive.  •You have questions or concerns about your condition or care.  Medicines: You may need any of the following for mild symptoms:   •Decongestants help reduce nasal congestion and help you breathe more easily. If you take decongestant pills, they may make you feel restless or cause problems with your sleep. Do not use decongestant sprays for more than a few days.  •Cough suppressants help reduce coughing. Ask your healthcare provider which type of cough medicine is best for you.  •Acetaminophen decreases pain and fever. It is available without a doctor's order. Ask how much to take and how often to take it. Follow directions. Read the labels of all other medicines you are using to see if they also contain acetaminophen, or ask your doctor or pharmacist. Acetaminophen can cause liver damage if not taken correctly. Do not use more than 4 grams (4,000 milligrams) total of acetaminophen in one day.   •NSAIDs, such as ibuprofen, help decrease swelling, pain, and fever. NSAIDs can cause stomach bleeding or kidney problems in certain people. If you take blood thinner medicine, always ask your healthcare provider if NSAIDs are safe for you. Always read the medicine label and follow directions.  •Take your medicine as directed. Contact your healthcare provider if you think your medicine is not helping or if you have side effects. Tell him or her if you are allergic to any medicine. Keep a list of the medicines, vitamins, and herbs you take. Include the amounts, and when and why you take them. Bring the list or the pill bottles to follow-up visits. Carry your medicine list with you in case of an emergency.    How the 2019 coronavirus spreads: The virus spreads quickly and easily. You can become infected if you are in contact with a large amount of the virus, even for a short time. You can also become infected by being around a small amount of virus for a long time. The following are ways the virus is thought to spread, but more information may be coming:   •Droplets are the most common way all coronaviruses spread. The virus can travel in droplets that form when a person talks, coughs, or sneezes. Anyone who breathes in the droplets or gets them in his or her eyes can become infected with the virus. Droplets can stay in the air for a few hours and travel farther than 6 feet (2 meters). A person can have contact with the droplets, even after the infected person leaves the room. This is called airborne transmission, a less common way the virus can spread. It has mainly happened in closed rooms that do not have flowing air.  •Close personal contact with an infected person increases the risk for infection. Close personal contact means you are within 6 feet (2 meters) of the person. The virus can be passed starting 2 days before symptoms begin. The virus can be passed even if the person never develops symptoms, starting 2 days before a positive test. Infection can happen from close contact for a total of 15 minutes or more over 24 hours. An example is close contact 3 times for 5 minutes each within 24 hours. Some factors make infection more likely. These include how close you are and for how long. Your risk is higher if you are indoors and no air is circulating. The risk is even higher if both of you are not wearing face coverings.  •Person-to-person contact can spread the virus. For example, a person with the virus on his or her hands can spread it by shaking hands with someone. Some newborns have tested positive for the virus. It is not known for sure if the newborns were infected during pregnancy or after they were born. The greatest risk is for a  to be in close contact with an infected person. The virus also does not appear to spread in breast milk. If you are pregnant or breastfeeding, talk to your healthcare provider or obstetrician about any concerns you have.  •The virus can stay on objects and surfaces. A person can get the virus on his or her hands by touching the object or surface. Infection happens if the person then touches his or her eyes or mouth with unwashed hands. It is not yet known how long the virus can stay on an object or surface. That is why it is important to clean all surfaces that are used regularly.  •An infected animal may be able to infect a person who touches it. This may happen at live markets or on a farm.  How everyone can lower the risk for COVID-19: The best way to prevent infection is to avoid anyone who is infected, but this can be hard to do. An infected person can spread the virus before signs or symptoms begin, or even if signs or symptoms never develop. The following can help lower the risk for infection:   Limit the Spread of Infectious Disease   •Wash your hands often throughout the day. Use soap and water. Rub your soapy hands together, lacing your fingers. Wash the front and back of each hand, and in between your fingers. Use the fingers of one hand to scrub under the fingernails of the other hand. Wash for at least 20 seconds. Rinse with warm, running water for several seconds. Then dry your hands with a clean towel or paper towel. Use hand  that contains alcohol if soap and water are not available. Do not touch your eyes, nose, or mouth without washing your hands first. Teach children how to wash their hands and use hand .  Handwashing   •Cover a sneeze or cough. This prevents droplets from traveling from you to others. Turn your face away and cover your mouth and nose with a tissue. Throw the tissue away. Use the bend of your arm if a tissue is not available. Then wash your hands well with soap and water or use hand . Turn and cover your face if you are around someone who is sneezing or coughing. Teach children how to cover a cough or sneeze.  •Follow worldwide, national, and local social distancing guidelines. Social distancing means people avoid close physical contact so the virus cannot spread from one person to another. Keep at least 6 feet (2 meters) between you and others. Also keep this distance from anyone who comes to your home, such as someone making a delivery.  •Make a habit of not touching your face. It is not known how long the virus can stay on objects and surfaces. If you get the virus on your hands, you can transfer it to your eyes, nose, or mouth and become infected. You can also transfer it to objects, surfaces, or people. Be aware of what you touch when you go out. Examples include handrails and elevator buttons. Try not to touch anything with bare hands unless it is necessary. Wash your hands before you leave your home and when you return.  •Clean and disinfect high-touch surfaces and objects often. Use a disinfecting solution or wipes. You can make a solution by diluting 4 teaspoons of bleach in 1 quart (4 cups) of water. Clean and disinfect even if you think no one living in or coming to your home is infected with the virus. You can wipe items with a disinfecting cloth before you bring them into your home. Wash your hands after you handle anything you bring into your home.  •Ask about vaccines you may need. No COVID-19 vaccine is currently available. A vaccine is under investigation for use as active immunization against COVID-19 in adults. Your healthcare provider can give you more information about when a vaccine may be available to you. In the meantime, other vaccines can help your immune system fight infections. Get the influenza (flu) vaccine as soon as recommended each year, usually starting in September or October. Get the pneumonia vaccine if recommended. Your healthcare provider can tell you if you also need other vaccines, and when to get them.  Social distancing guidelines: National and local social distancing rules vary. Rules may change over time as restrictions are lifted. Restrictions may return if an outbreak happens where you live. It is important to know and follow all current social distancing rules in your area. The following are general guidelines:  •Stay home if you are sick or think you may have COVID-19. It is important to stay home if you are waiting for a testing appointment or for test results. Even if you do not have symptoms, you can pass the virus to others.  •Limit trips out of your home. You may be able to have food, medicines, and other supplies delivered. If possible, have delivered items left at your door or other area. Try not to have someone hand you an item. You will be so close to the person that the virus can spread between you. Plan trips out of your home. Think about how many people you may have contact with, and for how long. Plan your route so you make the fewest stops possible to limit contact. Keep track of places you go and anyone you have contact with. This will help contact tracers notify others if you become infected.  •Do not have close physical contact with anyone who does not live in your home. Do not shake hands with, hug, or kiss a person as a greeting. Stand or walk as far from others as possible. If you must use public transportation (such as a bus or subway), try to sit or stand away from others. You can stay safely connected with others through phone calls, e-mail messages, social media websites, and video chats. Check in on anyone who may be having a hard time socially distancing, or who lives alone. Ask administrators at nursing homes or long-term care facilities how you can safely communicate with someone living there.  •Wear a face covering (mask) around anyone who does not live in your home. A covering helps protect the person wearing it from being infected or passing the virus to others. Do not wear a plastic face shield instead of a covering. You can use both together for extra protection. Use a disposable non-medical mask, or make a cloth covering with at least 2 layers. Cover your mouth and your nose. Securely fasten it under your chin and on the sides of your face. A face covering is not a substitute for other safety measures. Continue social distancing and washing your hands often. Do not put coverings on children younger than 2 years or on anyone who has breathing problems or cannot remove it. Talk to your healthcare provider if you or someone you care for cannot wear a face covering.  •Only allow medical professionals or other necessary helpers into your home. Wear your face covering, and remind others to wear a face covering. Remind them to wash their hands when they arrive and before they leave. Do not let a visitor into your home, even if the person is not sick. A person can pass the virus to others before symptoms of COVID-19 begin. Some people never even develop symptoms. Children commonly have mild symptoms or no symptoms. It may be hard to tell a child not to hug or kiss you. Explain that this is how he or she can help you stay healthy.  •Do not go to someone else's home unless it is necessary. Do not go over to visit, even if the person is lonely. Only go if you need to help him or her. Make sure you both wear face coverings while you are there.  •Avoid large gatherings and crowds. Gatherings or crowds of 10 or more individuals can cause the virus to spread. Examples of gatherings include parties, holiday meals, sporting events, Congregation services, and conferences. Crowds may form at beaches, artis, and tourist attractions. Protect yourself by staying away from large gatherings and crowds.  •Ask your healthcare provider for other ways to have appointments. You may be able to have appointments without having to go into the provider's office. Some providers offer phone, video, or other types of appointments. You may also be able to get prescriptions for a few months of your medicines at a time.  •Stay safe if you must go out to work. You may have a job that can only be done outside your home. Keep physical distance between you and other workers as much as possible. Follow your employer's rules so everyone stays safe.  If you have COVID-19 and are recovering at home: Healthcare providers will give you specific instructions to follow. The following are general guidelines to remind you how to keep others safe until you are well:   •Wash your hands often. Use soap and water as much as possible. You can use hand  that contains alcohol if soap and water are not available. Do not share towels with anyone. If you use paper towels, throw them away in a lined trash can kept in your room or area. Use a covered trash can, if possible.  •Do not go out of your home unless it is necessary. You may have to go to your healthcare provider's office for check-ups or to get prescription refills. Do not arrive at the provider's office without an appointment. Providers have to make their offices safe for staff and other patients. If possible, ask someone who is not infected to go out for what you need. This includes groceries, medicines, and household items.  •Only have close physical contact with a person giving direct care, or a baby or child you must care for. Family members and friends should not visit you. If possible, stay in a separate area or room of your home if you live with others. No one should go into the area or room except to give you care. You can visit with others by phone, video chat, e-mail, or similar systems. It is important to stay connected with others in your life while you recover.  •Wear a face covering while others are near you. This can help prevent droplets from spreading the virus when you talk, sneeze, or cough. Put the covering on before anyone comes into your room or area. Remind the person to cover his or her nose and mouth before going in to provide care for you.  •Do not share items. Do not share dishes, towels, or other items with anyone. Items need to be washed after you use them.  •Protect your baby. Wash your hands with soap and water often throughout the day. Wear a clean face covering while you breastfeed, or while you express or pump breast milk. If possible, ask someone who is well to care for your baby. You can put breast milk in bottles for the person to use, if needed. Talk to your healthcare provider if you have any questions or concerns about caring for or bonding with your baby. He or she will tell you when to bring your baby in for check-ups and vaccines. He or she will also tell you what to do if you think your baby was infected with the new virus.  •Do not handle live animals unless it is necessary. Until more is known, it is best not to touch, play with, or handle live animals. Some animals, including pets, have been infected with the new coronavirus. Do not handle or care for animals until you are well. Care includes feeding, petting, and cuddling your pet. Do not let your pet lick you or share your food. Ask someone who is not infected to take care of your pet, if possible. If you must care for a pet, wear a face covering. Wash your hands before and after you give care. Talk to your healthcare provider about how to keep a service animal safe, if needed.  •Follow directions from your healthcare provider for being around others after you recover. It is not known for sure if or for how long a recovered person can pass the virus to others. Your provider may give you instructions, such as continuing social distancing or wearing a face covering around others. The following are general guidelines for when you can be around others:?If you never developed any symptoms, wait at least 10 days after your positive test. Your provider may want you to have 2 negative tests in a row at least 24 hours apart. This depends on how available testing is in your area.  If you did have symptoms, wait at least 10 days after the symptoms first appeared. Then you will need to have no fever for 24 hours without fever medicine. Most of your symptoms will also need to be gone. A loss of taste or smell may continue for several months. It is considered okay to be around others if this is your only symptom.•Follow your healthcare provider's instructions. You will need to quarantine while you are caring for the infected person. You should also be tested, even if you do not develop symptoms of COVID-19. These measures will help protect others you are around while you are giving care. Your provider will give you instructions for quarantining and testing.    Follow up with your doctor as directed: Write down your questions so you remember to ask them during your visits.    For more information:   •Centers for Disease Control and Prevention  [address] [place] [id number]  Phone: 1-936.173.8499  Web Address: http://www.cdc.gov

## 2021-02-09 ENCOUNTER — APPOINTMENT (OUTPATIENT)
Dept: CARDIOLOGY | Facility: CLINIC | Age: 66
End: 2021-02-09

## 2021-02-10 ENCOUNTER — NON-APPOINTMENT (OUTPATIENT)
Age: 66
End: 2021-02-10

## 2021-02-12 ENCOUNTER — APPOINTMENT (OUTPATIENT)
Dept: INFECTIOUS DISEASE | Facility: CLINIC | Age: 66
End: 2021-02-12
Payer: COMMERCIAL

## 2021-02-12 PROCEDURE — 99215 OFFICE O/P EST HI 40 MIN: CPT | Mod: 95

## 2021-02-12 NOTE — REASON FOR VISIT
[Follow-Up: _____] : a [unfilled] follow-up visit [Home] : at home, [unfilled] , at the time of the visit. [Medical Office: ([facility])___] : at the medical office located in  [Spouse] : spouse [Other:____] : [unfilled]

## 2021-02-12 NOTE — HISTORY OF PRESENT ILLNESS
[FreeTextEntry1] : 65 year old male with HTN, hyperlipidemia, type II DM with peripheral neuropathy, CAD s/p MIDCAB (2018)/VERONICA, HFrEF, AICD (2/20), pulmonary HTN with recurrent R knee PPJI s/p arthroscopic I&D 7/17 and debridement with explantation and spacer placement 7/22. MRSA grew in blood and from joint fluid. He was treated with 6 weeks of vancomycin and rifampin. On 9/4 he underwent right hip hardware exchange and open biopsy - no growth from cultures. He completed vancomycin and rifampin on 10/30. On 11/16 he had R knee aspiration, 3500 WBC with 88% PMN, negative gram stain and cultures. Surgery options discussed.  He now has RLE swelling and drainage at graft site.  He’s also had drainage from prior perc sudhir drain site, pending abdominal wall mass excision which has been delayed due to +COVID.  He’s presented to ED on 1/24 and 2/3 requesting I&D, was discharged to home both times. He was given Bactrim after first ED visit, stated some improvement in symptoms that again worsened when he stopped.  Was off X 3 d - resumed 2/11 with continued worsening of erythema, swelling and drainage.  No fever, chills\par

## 2021-02-12 NOTE — ASSESSMENT
[FreeTextEntry1] : 66 yo M with HTN, hyperlipidemia, type II DM with peripheral neuropathy, CAD s/p MIDCAB (2018)/VERONICA, HFrEF, AICD (2/20), pulmonary HTN with recurrent R knee PPJI s/p arthroscopic I&D 7/17 and debridement with explantation and spacer placement 7/22.  MRSA grew in blood and from joint fluid.  Isolate had the same antibiogram as isolate in 11/2019, likely persistence. He was treated with 6 weeks of vancomycin and rifampin. On 9/4 he underwent right hip hardware exchange and open biopsy - no growth from cultures.  He completed vancomycin and rifampin on 10/30/20.  Knee remains painful and swollen.  He needs surgery for purulent drainage from biliary tract site - needs to be resolved prior to surgery for knee.  He had grown Pseudomonas and VRE faecium in the past - no safe oral options for therapy.  He had mild symptomatic COVID- symptoms have resolved however PCR still positive which is delaying surgical intervention.  Now with drainage and swelling of RLE graft site.  Discussed with Dr. Castro - he has continued to refuse fusion vs. AKA.  He is unable to operate even if he were to agree.  Will continue to try po suppression.  His prior isolate was resistant to TMP/SX, susceptible to doxy.  \par Plan:\par - D/C TMP/SX\par - Doxycycline 100 mg po q12h until evaluation after he is COVID-19 negative.\par - He was advised to go to the ED immediately if fever/chills.\par Follow up in 1 month.

## 2021-02-12 NOTE — PHYSICAL EXAM
[General Appearance - Alert] : alert [Sclera] : the sclera and conjunctiva were normal [] : no respiratory distress [FreeTextEntry1] : R LE with erythema/edema visible laterally;  picture reviewed - he has purulent drainage coming from incision

## 2021-02-12 NOTE — REVIEW OF SYSTEMS
[As Noted in HPI] : as noted in HPI [Fever] : no fever [Chills] : no chills [Eye Pain] : no eye pain [Eyesight Problems] : no eyesight problems [Nasal Discharge] : no nasal discharge [Sore Throat] : no sore throat [Chest Pain] : no chest pain [Palpitations] : no palpitations [Shortness Of Breath] : no shortness of breath [Cough] : no cough [Abdominal Pain] : no abdominal pain [Vomiting] : no vomiting [Dysuria] : no dysuria [Easy Bruising] : no tendency for easy bruising [Easy Bleeding] : no tendency for easy bleeding

## 2021-02-20 NOTE — ED ADULT TRIAGE NOTE - ADDITIONAL SAFETY/BANDS...
PAST SURGICAL HISTORY:  History of partial thyroidectomy     S/P coronary artery stent placement     S/P femoral-popliteal bypass surgery     
Additional Safety/Bands:

## 2021-03-04 ENCOUNTER — RX RENEWAL (OUTPATIENT)
Age: 66
End: 2021-03-04

## 2021-03-09 ENCOUNTER — APPOINTMENT (OUTPATIENT)
Dept: SURGICAL ONCOLOGY | Facility: CLINIC | Age: 66
End: 2021-03-09
Payer: COMMERCIAL

## 2021-03-09 VITALS
HEIGHT: 73 IN | WEIGHT: 167 LBS | SYSTOLIC BLOOD PRESSURE: 83 MMHG | TEMPERATURE: 97.8 F | DIASTOLIC BLOOD PRESSURE: 37 MMHG | OXYGEN SATURATION: 94 % | HEART RATE: 73 BPM | BODY MASS INDEX: 22.13 KG/M2

## 2021-03-09 DIAGNOSIS — I21.3 ST ELEVATION (STEMI) MYOCARDIAL INFARCTION OF UNSPECIFIED SITE: ICD-10-CM

## 2021-03-09 PROCEDURE — 99203 OFFICE O/P NEW LOW 30 MIN: CPT

## 2021-03-09 PROCEDURE — 99072 ADDL SUPL MATRL&STAF TM PHE: CPT

## 2021-03-09 RX ORDER — OXYCODONE AND ACETAMINOPHEN 5; 325 MG/1; MG/1
5-325 TABLET ORAL
Qty: 20 | Refills: 0 | Status: DISCONTINUED | COMMUNITY
End: 2021-03-09

## 2021-03-09 RX ORDER — HYDROMORPHONE HYDROCHLORIDE 2 MG/1
2 TABLET ORAL
Qty: 40 | Refills: 0 | Status: DISCONTINUED | COMMUNITY
Start: 2020-08-17 | End: 2021-03-09

## 2021-03-09 RX ORDER — METOPROLOL SUCCINATE 25 MG/1
25 TABLET, EXTENDED RELEASE ORAL
Qty: 90 | Refills: 0 | Status: DISCONTINUED | COMMUNITY
End: 2021-03-09

## 2021-03-09 RX ORDER — EZETIMIBE 10 MG/1
10 TABLET ORAL
Qty: 30 | Refills: 0 | Status: DISCONTINUED | COMMUNITY
Start: 2019-06-14 | End: 2021-03-09

## 2021-03-09 RX ORDER — MORPHINE SULFATE 30 MG/1
30 TABLET, FILM COATED, EXTENDED RELEASE ORAL
Qty: 90 | Refills: 0 | Status: DISCONTINUED | COMMUNITY
Start: 2020-11-09 | End: 2021-03-09

## 2021-03-10 ENCOUNTER — RX RENEWAL (OUTPATIENT)
Age: 66
End: 2021-03-10

## 2021-03-12 ENCOUNTER — INPATIENT (INPATIENT)
Facility: HOSPITAL | Age: 66
LOS: 3 days | Discharge: HOME | End: 2021-03-16
Attending: INTERNAL MEDICINE | Admitting: INTERNAL MEDICINE
Payer: COMMERCIAL

## 2021-03-12 VITALS
TEMPERATURE: 99 F | DIASTOLIC BLOOD PRESSURE: 62 MMHG | WEIGHT: 167.99 LBS | HEIGHT: 73 IN | RESPIRATION RATE: 20 BRPM | SYSTOLIC BLOOD PRESSURE: 129 MMHG | HEART RATE: 88 BPM | OXYGEN SATURATION: 98 %

## 2021-03-12 DIAGNOSIS — Z98.890 OTHER SPECIFIED POSTPROCEDURAL STATES: Chronic | ICD-10-CM

## 2021-03-12 DIAGNOSIS — Z41.9 ENCOUNTER FOR PROCEDURE FOR PURPOSES OTHER THAN REMEDYING HEALTH STATE, UNSPECIFIED: Chronic | ICD-10-CM

## 2021-03-12 DIAGNOSIS — Z95.1 PRESENCE OF AORTOCORONARY BYPASS GRAFT: Chronic | ICD-10-CM

## 2021-03-12 DIAGNOSIS — Z96.651 PRESENCE OF RIGHT ARTIFICIAL KNEE JOINT: Chronic | ICD-10-CM

## 2021-03-12 DIAGNOSIS — Z95.5 PRESENCE OF CORONARY ANGIOPLASTY IMPLANT AND GRAFT: Chronic | ICD-10-CM

## 2021-03-12 LAB
ALBUMIN SERPL ELPH-MCNC: 4.1 G/DL — SIGNIFICANT CHANGE UP (ref 3.5–5.2)
ALP SERPL-CCNC: 142 U/L — HIGH (ref 30–115)
ALT FLD-CCNC: 28 U/L — SIGNIFICANT CHANGE UP (ref 0–41)
ANION GAP SERPL CALC-SCNC: 11 MMOL/L — SIGNIFICANT CHANGE UP (ref 7–14)
APPEARANCE UR: CLEAR — SIGNIFICANT CHANGE UP
AST SERPL-CCNC: 25 U/L — SIGNIFICANT CHANGE UP (ref 0–41)
BASOPHILS # BLD AUTO: 0.06 K/UL — SIGNIFICANT CHANGE UP (ref 0–0.2)
BASOPHILS NFR BLD AUTO: 0.6 % — SIGNIFICANT CHANGE UP (ref 0–1)
BILIRUB SERPL-MCNC: 0.5 MG/DL — SIGNIFICANT CHANGE UP (ref 0.2–1.2)
BILIRUB UR-MCNC: NEGATIVE — SIGNIFICANT CHANGE UP
BUN SERPL-MCNC: 27 MG/DL — HIGH (ref 10–20)
CALCIUM SERPL-MCNC: 9.6 MG/DL — SIGNIFICANT CHANGE UP (ref 8.5–10.1)
CHLORIDE SERPL-SCNC: 98 MMOL/L — SIGNIFICANT CHANGE UP (ref 98–110)
CO2 SERPL-SCNC: 27 MMOL/L — SIGNIFICANT CHANGE UP (ref 17–32)
COLOR SPEC: SIGNIFICANT CHANGE UP
CREAT SERPL-MCNC: 1.2 MG/DL — SIGNIFICANT CHANGE UP (ref 0.7–1.5)
DIFF PNL FLD: NEGATIVE — SIGNIFICANT CHANGE UP
EOSINOPHIL # BLD AUTO: 0.28 K/UL — SIGNIFICANT CHANGE UP (ref 0–0.7)
EOSINOPHIL NFR BLD AUTO: 3 % — SIGNIFICANT CHANGE UP (ref 0–8)
ERYTHROCYTE [SEDIMENTATION RATE] IN BLOOD: 60 MM/HR — HIGH (ref 0–10)
GLUCOSE SERPL-MCNC: 195 MG/DL — HIGH (ref 70–99)
GLUCOSE UR QL: ABNORMAL
HCT VFR BLD CALC: 33.9 % — LOW (ref 42–52)
HGB BLD-MCNC: 10.3 G/DL — LOW (ref 14–18)
IMM GRANULOCYTES NFR BLD AUTO: 0.2 % — SIGNIFICANT CHANGE UP (ref 0.1–0.3)
KETONES UR-MCNC: NEGATIVE — SIGNIFICANT CHANGE UP
LEUKOCYTE ESTERASE UR-ACNC: NEGATIVE — SIGNIFICANT CHANGE UP
LYMPHOCYTES # BLD AUTO: 1.5 K/UL — SIGNIFICANT CHANGE UP (ref 1.2–3.4)
LYMPHOCYTES # BLD AUTO: 16.1 % — LOW (ref 20.5–51.1)
MCHC RBC-ENTMCNC: 21.8 PG — LOW (ref 27–31)
MCHC RBC-ENTMCNC: 30.4 G/DL — LOW (ref 32–37)
MCV RBC AUTO: 71.8 FL — LOW (ref 80–94)
MONOCYTES # BLD AUTO: 0.95 K/UL — HIGH (ref 0.1–0.6)
MONOCYTES NFR BLD AUTO: 10.2 % — HIGH (ref 1.7–9.3)
NEUTROPHILS # BLD AUTO: 6.52 K/UL — HIGH (ref 1.4–6.5)
NEUTROPHILS NFR BLD AUTO: 69.9 % — SIGNIFICANT CHANGE UP (ref 42.2–75.2)
NITRITE UR-MCNC: NEGATIVE — SIGNIFICANT CHANGE UP
NRBC # BLD: 0 /100 WBCS — SIGNIFICANT CHANGE UP (ref 0–0)
PH UR: 6 — SIGNIFICANT CHANGE UP (ref 5–8)
PLATELET # BLD AUTO: 324 K/UL — SIGNIFICANT CHANGE UP (ref 130–400)
POTASSIUM SERPL-MCNC: 4.3 MMOL/L — SIGNIFICANT CHANGE UP (ref 3.5–5)
POTASSIUM SERPL-SCNC: 4.3 MMOL/L — SIGNIFICANT CHANGE UP (ref 3.5–5)
PROT SERPL-MCNC: 8.5 G/DL — HIGH (ref 6–8)
PROT UR-MCNC: SIGNIFICANT CHANGE UP
RBC # BLD: 4.72 M/UL — SIGNIFICANT CHANGE UP (ref 4.7–6.1)
RBC # FLD: 21.3 % — HIGH (ref 11.5–14.5)
SARS-COV-2 RNA SPEC QL NAA+PROBE: SIGNIFICANT CHANGE UP
SODIUM SERPL-SCNC: 136 MMOL/L — SIGNIFICANT CHANGE UP (ref 135–146)
SP GR SPEC: 1.01 — SIGNIFICANT CHANGE UP (ref 1.01–1.03)
UROBILINOGEN FLD QL: SIGNIFICANT CHANGE UP
WBC # BLD: 9.33 K/UL — SIGNIFICANT CHANGE UP (ref 4.8–10.8)
WBC # FLD AUTO: 9.33 K/UL — SIGNIFICANT CHANGE UP (ref 4.8–10.8)

## 2021-03-12 PROCEDURE — 99285 EMERGENCY DEPT VISIT HI MDM: CPT

## 2021-03-12 PROCEDURE — 99223 1ST HOSP IP/OBS HIGH 75: CPT

## 2021-03-12 PROCEDURE — 99233 SBSQ HOSP IP/OBS HIGH 50: CPT

## 2021-03-12 PROCEDURE — 70450 CT HEAD/BRAIN W/O DYE: CPT | Mod: 26

## 2021-03-12 PROCEDURE — 93010 ELECTROCARDIOGRAM REPORT: CPT

## 2021-03-12 RX ORDER — CLONAZEPAM 1 MG
0.5 TABLET ORAL THREE TIMES A DAY
Refills: 0 | Status: DISCONTINUED | OUTPATIENT
Start: 2021-03-12 | End: 2021-03-16

## 2021-03-12 RX ORDER — MORPHINE SULFATE 50 MG/1
30 CAPSULE, EXTENDED RELEASE ORAL DAILY
Refills: 0 | Status: DISCONTINUED | OUTPATIENT
Start: 2021-03-12 | End: 2021-03-16

## 2021-03-12 RX ORDER — DIGOXIN 250 MCG
125 TABLET ORAL DAILY
Refills: 0 | Status: DISCONTINUED | OUTPATIENT
Start: 2021-03-12 | End: 2021-03-16

## 2021-03-12 RX ORDER — SPIRONOLACTONE 25 MG/1
25 TABLET, FILM COATED ORAL DAILY
Refills: 0 | Status: DISCONTINUED | OUTPATIENT
Start: 2021-03-12 | End: 2021-03-16

## 2021-03-12 RX ORDER — DULOXETINE HYDROCHLORIDE 30 MG/1
20 CAPSULE, DELAYED RELEASE ORAL DAILY
Refills: 0 | Status: DISCONTINUED | OUTPATIENT
Start: 2021-03-12 | End: 2021-03-16

## 2021-03-12 RX ORDER — ATORVASTATIN CALCIUM 80 MG/1
80 TABLET, FILM COATED ORAL AT BEDTIME
Refills: 0 | Status: DISCONTINUED | OUTPATIENT
Start: 2021-03-12 | End: 2021-03-16

## 2021-03-12 RX ORDER — METOPROLOL TARTRATE 50 MG
25 TABLET ORAL DAILY
Refills: 0 | Status: DISCONTINUED | OUTPATIENT
Start: 2021-03-12 | End: 2021-03-16

## 2021-03-12 RX ORDER — PRASUGREL 5 MG/1
10 TABLET, FILM COATED ORAL DAILY
Refills: 0 | Status: DISCONTINUED | OUTPATIENT
Start: 2021-03-12 | End: 2021-03-16

## 2021-03-12 RX ORDER — CHLORHEXIDINE GLUCONATE 213 G/1000ML
1 SOLUTION TOPICAL
Refills: 0 | Status: DISCONTINUED | OUTPATIENT
Start: 2021-03-12 | End: 2021-03-16

## 2021-03-12 RX ORDER — ASPIRIN/CALCIUM CARB/MAGNESIUM 324 MG
81 TABLET ORAL DAILY
Refills: 0 | Status: DISCONTINUED | OUTPATIENT
Start: 2021-03-12 | End: 2021-03-16

## 2021-03-12 RX ORDER — FUROSEMIDE 40 MG
40 TABLET ORAL DAILY
Refills: 0 | Status: DISCONTINUED | OUTPATIENT
Start: 2021-03-12 | End: 2021-03-16

## 2021-03-12 RX ORDER — DIAZEPAM 5 MG
10 TABLET ORAL ONCE
Refills: 0 | Status: DISCONTINUED | OUTPATIENT
Start: 2021-03-12 | End: 2021-03-12

## 2021-03-12 RX ORDER — ENOXAPARIN SODIUM 100 MG/ML
40 INJECTION SUBCUTANEOUS AT BEDTIME
Refills: 0 | Status: DISCONTINUED | OUTPATIENT
Start: 2021-03-12 | End: 2021-03-16

## 2021-03-12 RX ORDER — MIDAZOLAM HYDROCHLORIDE 1 MG/ML
5 INJECTION, SOLUTION INTRAMUSCULAR; INTRAVENOUS ONCE
Refills: 0 | Status: DISCONTINUED | OUTPATIENT
Start: 2021-03-12 | End: 2021-03-12

## 2021-03-12 RX ADMIN — Medication 10 MILLIGRAM(S): at 03:25

## 2021-03-12 RX ADMIN — Medication 0.5 MILLIGRAM(S): at 16:11

## 2021-03-12 RX ADMIN — MIDAZOLAM HYDROCHLORIDE 5 MILLIGRAM(S): 1 INJECTION, SOLUTION INTRAMUSCULAR; INTRAVENOUS at 06:36

## 2021-03-12 RX ADMIN — PRASUGREL 10 MILLIGRAM(S): 5 TABLET, FILM COATED ORAL at 16:12

## 2021-03-12 RX ADMIN — Medication 81 MILLIGRAM(S): at 16:12

## 2021-03-12 RX ADMIN — ENOXAPARIN SODIUM 40 MILLIGRAM(S): 100 INJECTION SUBCUTANEOUS at 22:16

## 2021-03-12 RX ADMIN — Medication 0.5 MILLIGRAM(S): at 22:16

## 2021-03-12 RX ADMIN — DULOXETINE HYDROCHLORIDE 20 MILLIGRAM(S): 30 CAPSULE, DELAYED RELEASE ORAL at 16:12

## 2021-03-12 RX ADMIN — ATORVASTATIN CALCIUM 80 MILLIGRAM(S): 80 TABLET, FILM COATED ORAL at 22:16

## 2021-03-12 NOTE — ED ADULT NURSE NOTE - OBJECTIVE STATEMENT
pt c/o erratic movements that he cannot control. pt states this has happened previously in the past, but only for about 30 minutes then the episode ends. pt states this current episode has been going on for over 4 hours. pt denies any pain. both pt arms and legs colton are uncontrollable.

## 2021-03-12 NOTE — H&P ADULT - NSICDXPASTMEDICALHX_GEN_ALL_CORE_FT
PAST MEDICAL HISTORY:  Atherosclerosis of coronary artery CAD (coronary artery disease)    Blood clot due to device, implant, or graft was on blood thinners    Celiac disease     CHF (congestive heart failure) EF ~ 25%    Diabetes on insulin pump    Diabetic neuropathy     Diverticulitis     History of celiac disease     HLD (hyperlipidemia)     HTN (hypertension)     Osteoarthritis     Status post percutaneous transluminal coronary angioplasty in 2012    STEMI (ST elevation myocardial infarction)

## 2021-03-12 NOTE — H&P ADULT - HISTORY OF PRESENT ILLNESS
65 M with h xof CAD s/p MI, PCI, HTN, DM, neuropathy, chronic b/l LE ulcers presents with complaints of uncontrollable movements of the extremities. States that he occasionally gets this intermittently for the past 2 years but the episodes last 30 mins to 1 hour and resolves. This episode has been going on for the past 5 hours without resolution. Denies falls/trauma, f/c, headache, n/v. VS reviewed, pt non-toxic appearing, NAD. Head ncat, MMM, neck supple, normal ROM, normal s1s2 without any murmurs, Lungs CTAB with normal work of breathing. abd +BS, s/nd/nt, extremities with chronic healing ulcers of b/l LE, AAOx 3, CN II to XII wnl, normal motor, sensation exam. + involuntary, chorea-like movements of all extremities. No acute skin rashes. Plan is meds as needed, labs, imaging and dispo accordingly.   Patient is a 65M with PMH of CAD, DM, HTN, chronic BLE ulcers, neuropathy, and CHFrEF (15-20%) presenting to ED with involuntary chorea-form movements. Patient unable to control them, movements are of both upper and lower extremities. Patient with occasional chorea form movements in ED. +Muscular wasting noted upon examination (particularly thenar eminence), +chronic BLE skin ulcerations. Patient s/p Versed 06:30AM, resting upon reassessment. Hx of covid infection 02/21. Given Valium as well, CTH with motion limited study demonstrates no gross evidence of acute intracranial abnormality.   65 year old male has medical history of  CAD s/p MI, PCI/CABG?, CHFrEF (15-20%), has ICD, HTN, Celiac disease, DM, neuropathy, chronic b/l LE ulcers presents with complaints of uncontrollable movements of the upper and lower extremities. Patient has had these movements for over a year now, it randomly comes and resolves on its own. It lasts about 15-20 min, where his movements are purposeless. No known exacerbating or relieving factors. Off note, patient reports of losing about 30 lbs in last 6-8 months. Patient was adopted, unknown family history. Patient otherwise denies Nausea, vomiting, fever, chills, headaches, SOB, chest pain, abdominal pain, muscle weakness, lower extremity swelling, urinary or bowel habit changes. In ED CTH negative for acute bleed. On physical exam, random upper extremity movements notes, little bit lower movement. Muscle wasting present in both hands, and both legs. and chronic BLE skin ulcerations. Patient is admitted to medicine team for further neurological work up.     65 year old male has medical history of  CAD s/p MI, PCI/CABG?, CHFrEF (15-20%), has ICD, HTN, Celiac disease, DM, neuropathy, chronic b/l LE ulcers presents with complaints of uncontrollable movements of the upper and lower extremities. Patient has had these movements for over a year now, it randomly comes and resolves on its own. It lasts about 15-20 min, where his movements are purposeless. No known exacerbating or relieving factors. Off note, patient reports of losing about 30 lbs in last 6-8 months. Patient was adopted, unknown family history. Patient otherwise denies Nausea, vomiting, fever, chills, headaches, SOB, chest pain, abdominal pain, muscle weakness, lower extremity swelling, urinary or bowel habit changes. In ED CTH negative for acute bleed. On physical exam, random upper extremity movements notes, little bit lower movement. Muscle wasting present in both hands, and both legs. and chronic BLE skin ulcerations. Patient is admitted to medicine team for further neurological work up.    ICD device [employer]  Model # [id number]  Serial # [id number]  implanted on  February 11, 2020  phone # [phone]

## 2021-03-12 NOTE — CONSULT NOTE ADULT - ASSESSMENT
Assessment:    Plan:  Assessment: 65 year old male has medical history of  CAD s/p MI, PCI/CABG?, CHFrEF (15-20%), has ICD, HTN, Celiac disease, DM, neuropathy, chronic b/l LE ulcers presents with complaints of paroxysmal uncontrollable movements of the upper and lower extremities likely to be choreiform movements. Also noted to have severe muscular atrophy     Plan:  - start Clonazepam 0.5mg TID   - check routine EEG   - MRI (check if defibrillator is MRI compatible)  - check Celiac Ab, given hx of Celiac disease if uncontrolled may present with neuropsychiatric symptoms  - check paraneoplastic markers, autoimmune markers (OBED, Sjogerns antibodies), SPEP, UPEP, Ceruloplasmin, copper level   - if all workup is negative, may consider LP    Assessment: 65 year old male has medical history of  CAD s/p MI, PCI/CABG?, CHFrEF (15-20%), has ICD, HTN, Celiac disease, DM, neuropathy, chronic b/l LE ulcers presents with complaints of paroxysmal uncontrollable movements of the upper and lower extremities likely to be choreiform movements. Also noted to have severe muscular atrophy   CTH findings limited by motion artifact. No intracranial abnormalities    Plan:  - start Clonazepam 0.5mg TID   - check routine EEG   - MRI (check if defibrillator is MRI compatible)  - check Celiac Ab, given hx of Celiac disease if uncontrolled may present with neuropsychiatric symptoms  - check paraneoplastic markers, autoimmune markers (OBED, Sjogerns antibodies), SPEP, UPEP, Ceruloplasmin, copper level   - if all workup is negative, may consider LP    Assessment: 65 year old male has medical history of  CAD s/p MI, PCI/CABG?, CHFrEF (15-20%), has ICD, HTN, Celiac disease, DM, neuropathy, chronic b/l LE ulcers presents with  paroxysmal choreiform movements of the upper and lower extremities of unclear etiology and duration.  May represent sporadic paroxysmal non-kinesogenic choreoathetosis vs celiac-related progressive chorea but will need to r/o secondary causes (e.g. toxic/metabolic/inflammatory/paraneoplastic).       Plan:  - start Clonazepam 0.5mg TID ATC  - check routine EEG   - check if PPM/AICD is MRI compatible - is so, recommend MRI Brain w/w/o chema  - check Celiac Abs, paraneoplastic markers, autoimmune markers (OBED, Sjogerns antibodies, lupus, etc.), SPEP/UPEP, Ceruloplasmin, copper level, lyme WB, ACE level, HgbA1C, CRMP5/CV2 abs, NMDA receptor abs, CASPR2 ab, LGI1 ab, B12, TSH, thyroid autoantibodies, PTH levels  - maintain gluten-free diet  - if all workup is negative, may consider LP

## 2021-03-12 NOTE — H&P ADULT - NSHPPHYSICALEXAM_GEN_ALL_CORE
GENERAL: NAD, well-developed, AAOx3, random purposeless movement of upper ext  HEENT:  Atraumatic, Normocephalic. EOMI, PERRLA, conjunctiva and sclera clear, No JVD  PULMONARY: Clear to auscultation bilaterally; No wheeze  CARDIOVASCULAR: s1/s2  GASTROINTESTINAL: Soft, Nontender, Nondistended; Bowel sounds present  MUSCULOSKELETAL:  2+ Peripheral Pulses, chronic ulcer on both lower ext present GENERAL: NAD, well-developed, AAOx3, random purposeless movement of upper ext  HEENT:  Atraumatic, Normocephalic. EOMI, PERRLA, conjunctiva and sclera clear, No JVD  PULMONARY: Clear to auscultation bilaterally; No wheeze  CARDIOVASCULAR: s1/s2  GASTROINTESTINAL: Soft, Nontender, Nondistended; Bowel sounds present  MUSCULOSKELETAL:  2+ Peripheral Pulses, chronic ulcer on both lower ext present  See neuro not for full neurological examination

## 2021-03-12 NOTE — ED ADULT TRIAGE NOTE - BEFAST ARM NUMBNESS
No blood pressure elevated today repeat 140/82   states prescribed medication but non complaint medical consult scheduled for 7/16/18 , will obtain new prescription at that time.  advise once receive continue medication on the day of surgery

## 2021-03-12 NOTE — ED ADULT NURSE REASSESSMENT NOTE - NSIMPLEMENTINTERV_GEN_ALL_ED
Implemented All Fall Risk Interventions:  [place] to call system. Call bell, personal items and telephone within reach. Instruct patient to call for assistance. Room bathroom lighting operational. Non-slip footwear when patient is off stretcher. Physically safe environment: no spills, clutter or unnecessary equipment. Stretcher in lowest position, wheels locked, appropriate side rails in place. Provide visual cue, wrist band, yellow gown, etc. Monitor gait and stability. Monitor for mental status changes and reorient to person, place, and time. Review medications for side effects contributing to fall risk. Reinforce activity limits and safety measures with patient and family.

## 2021-03-12 NOTE — H&P ADULT - ATTENDING COMMENTS
65 year old male has medical history of  CAD s/p MI, PCI/CABG?, CHFrEF (15-20%), has ICD, HTN, Celiac disease, DM, neuropathy, chronic b/l LE ulcers presents with complaints of uncontrollable movements of the upper and lower extremities. Symptoms have been ongoing for about 1 year, last for 15-20m at a time. Admitted to Medicine service for further workup.       PE:   GENERAL: NAD, well-developed, AAOx3, random purposeless movement of upper ext  HEENT:  Atraumatic, Normocephalic. EOMI, PERRLA, conjunctiva and sclera clear, No JVD  PULMONARY: Clear to auscultation bilaterally; No wheeze  CARDIOVASCULAR: s1/s2  GASTROINTESTINAL: Soft, Nontender, Nondistended; Bowel sounds present  MUSCULOSKELETAL:  2+ Peripheral Pulses, chronic ulcer on both lower ext present  See neuro not for full neurological examination      A/P    #Choreiform Movement Disorder - Etiology Unclear   - CTH negative, unknown family history  - pt continues to have movements while sleeping   - symptoms ongoing for 1 year   - Neurology consulted:   ddx: sporadic paroxysmal non-kinesogenic choreoathetosis vs celiac-related progressive chorea   - start Clonazepam 0.5mg TID   - check routine EEG   - check if PPM/AICD is MRI compatible - is so, recommend MRI Brain w/w/o chema  - check Celiac Abs, paraneoplastic markers, autoimmune markers (OBED, Sjogerns antibodies, lupus, etc.), SPEP/UPEP, Ceruloplasmin, copper level, lyme WB, ACE level, HgbA1C, CRMP5/CV2 abs, NMDA receptor abs, CASPR2 ab, LGI1 ab, B12, TSH, thyroid autoantibodies, PTH levels  - maintain gluten-free diet  - if all workup is negative, then consider LP       # CAD, s/p PCI/ CABG  # HLD  # CHFrEF (15-20%) - s/p ICD  - continue aspirin/statin/effient/metoprolol/spironolactone/lasix/digoxin     # LE Neuropathy   - continue Duloxetine     # DM  - monitor FS  - cont insulin pump    # Left knee replacement  - morphine IR 30 mg prn       DVT ppx: lovenox         I have reviewed the above Resident documentation. My edits per this addendum. Remainder as above.         Krysten Ohara,  65 year old male has medical history of  CAD s/p MI, PCI/CABG?, CHFrEF (15-20%), has ICD, HTN, Celiac disease, DM, neuropathy, chronic b/l LE ulcers presents with complaints of uncontrollable movements of the upper and lower extremities. Symptoms have been ongoing for about 1 year, last for 15-20m at a time. Admitted to Medicine service for further workup.     PE:   GENERAL: resting comfortably , random purposeless movement of upper and lower ext  HEENT:  Atraumatic, Normocephalic. EOMI, PERRLA, conjunctiva and sclera clear, No JVD  PULMONARY: Clear to auscultation bilaterally; No wheeze  CARDIOVASCULAR: s1/s2, RRR  GASTROINTESTINAL: Soft, Nontender, Nondistended; Bowel sounds present  MUSCULOSKELETAL:  2+ Peripheral Pulses, chronic ulcer on both lower ext present      A/P    #Choreiform Movement Disorder - Etiology Unclear   - CTH negative, unknown family history  - pt continues to have movements while sleeping   - symptoms ongoing for 1 year   - Neurology consulted:   ddx: sporadic paroxysmal non-kinesogenic choreoathetosis vs celiac-related progressive chorea   - start Clonazepam 0.5mg TID   - check routine EEG   - check if PPM/AICD is MRI compatible - is so, recommend MRI Brain w/w/o chema  - check Celiac Abs, paraneoplastic markers, autoimmune markers (OBED, Sjogerns antibodies, lupus, etc.), SPEP/UPEP, Ceruloplasmin, copper level, lyme WB, ACE level, HgbA1C, CRMP5/CV2 abs, NMDA receptor abs, CASPR2 ab, LGI1 ab, B12, TSH, thyroid autoantibodies, PTH levels  - maintain gluten-free diet  - if all workup is negative, then consider LP       # CAD, s/p PCI/ CABG  # HLD  # CHFrEF (15-20%) - s/p ICD  - continue aspirin/statin/effient/metoprolol/spironolactone/lasix/digoxin     # LE Neuropathy   - continue Duloxetine     # DM  - monitor FS  - cont insulin pump    # Left knee replacement  - morphine IR 30 mg prn       DVT ppx: lovenox         I have reviewed the above Resident documentation. My edits per this addendum. Remainder as above.         Krysten Ohara, DO

## 2021-03-12 NOTE — ED PROVIDER NOTE - NS ED ROS FT
Constitutional: (-) fever  Eyes/ENT: (-) blurry vision, (-) epistaxis  Cardiovascular: (-) chest pain, (-) syncope  Respiratory: (-) cough, (-) shortness of breath  Gastrointestinal: (-) vomiting, (-) diarrhea  Musculoskeletal: (-) neck pain, (-) back pain, (-) joint pain  Integumentary: (-) rash, (-) edema  Neurological: (-) headache, (-) altered mental status, (+) uncontrollable extremity movements   Psychiatric: (-) hallucinations  Allergic/Immunologic: (-) pruritus

## 2021-03-12 NOTE — ED ADULT NURSE NOTE - NSIMPLEMENTINTERV_GEN_ALL_ED
Implemented All Fall with Harm Risk Interventions:  [place] to call system. Call bell, personal items and telephone within reach. Instruct patient to call for assistance. Room bathroom lighting operational. Non-slip footwear when patient is off stretcher. Physically safe environment: no spills, clutter or unnecessary equipment. Stretcher in lowest position, wheels locked, appropriate side rails in place. Provide visual cue, wrist band, yellow gown, etc. Monitor gait and stability. Monitor for mental status changes and reorient to person, place, and time. Review medications for side effects contributing to fall risk. Reinforce activity limits and safety measures with patient and family. Provide visual clues: red socks.

## 2021-03-12 NOTE — ED PROVIDER NOTE - OBJECTIVE STATEMENT
The pt is a 65y M w/ hx of CAD s/p CABG & stents, CHF (echo 10/20 EF 15-20%), DM, HLD, chronic LE wounds presenting with uncontrollable extremity movements. Pt says he has had this in the past and it usually self-resolves. Today's episode started at 21:00 and has persisted until arrival to the ED. He has never seen a Neurologist about this. Denies any new medication use. Denies drug use. No fever, headache, chest pain, SOB, N/V, abdominal pain, diarrhea. Pt asking for Valium.

## 2021-03-12 NOTE — CONSULT NOTE ADULT - SUBJECTIVE AND OBJECTIVE BOX
Neurology Consult    Patient is a 65y old  Male who presents with a chief complaint of involuntary movements    HPI:  65 year old male has medical history of  CAD s/p MI, PCI/CABG?, CHFrEF (15-20%), has ICD, HTN, Celiac disease, DM, neuropathy, chronic b/l LE ulcers presents with complaints of uncontrollable movements of the upper and lower extremities. Patient has had these movements for over a year now, it randomly comes and resolves on its own. It lasts about 15-20 min, where his movements are purposeless. No known exacerbating or relieving factors. Off note, patient reports of losing about 30 lbs in last 6-8 months. Patient was adopted, unknown family history. Patient otherwise denies Nausea, vomiting, fever, chills, headaches, SOB, chest pain, abdominal pain, muscle weakness, lower extremity swelling, urinary or bowel habit changes. In ED CTH negative for acute bleed. On physical exam, random upper extremity movements notes, little bit lower movement. Muscle wasting present in both hands, and both legs. and chronic BLE skin ulcerations. Patient is admitted to medicine team for further neurological work up.    ICD device [employer]  Model # [id number]  Serial # [id number]  implanted on  2020  phone # [phone]     (12 Mar 2021 11:18)      PAST MEDICAL & SURGICAL HISTORY:  Celiac disease    Diabetic neuropathy    STEMI (ST elevation myocardial infarction)    Diverticulitis    History of celiac disease    CHF (congestive heart failure)  EF ~ 25%    HTN (hypertension)    Diabetes  on insulin pump    Blood clot due to device, implant, or graft  was on blood thinners    HLD (hyperlipidemia)    Osteoarthritis    Atherosclerosis of coronary artery  CAD (coronary artery disease)    Status post percutaneous transluminal coronary angioplasty  in     History of open reduction and internal fixation (ORIF) procedure    Surgery, elective  Right shoulder    Surgery, elective  right knee wound debridement    S/P TKR (total knee replacement), right  with infection Mrsa   per pt he was cleared from MRSA infection    S/P CABG x 1      Stented coronary artery  10/18 heart attack  INFERIOR WALL MI    Other postprocedural status  Fixation hardware in foot LEFT        FAMILY HISTORY:  Family history of allergies  daughter    Family history of heart disease (Mother)        Social History: (-) x 3    Allergies    ACE inhibitors (Hives)  carvedilol (Other)  enalapril (Hives)  Entresto (Other)    Intolerances        MEDICATIONS  (STANDING):  aspirin enteric coated 81 milliGRAM(s) Oral daily  atorvastatin 80 milliGRAM(s) Oral at bedtime  chlorhexidine 4% Liquid 1 Application(s) Topical <User Schedule>  clonazePAM  Tablet 0.5 milliGRAM(s) Oral three times a day  digoxin     Tablet 125 MICROGram(s) Oral daily  DULoxetine 20 milliGRAM(s) Oral daily  enoxaparin Injectable 40 milliGRAM(s) SubCutaneous at bedtime  metoprolol succinate ER 25 milliGRAM(s) Oral daily  prasugrel 10 milliGRAM(s) Oral daily  spironolactone 25 milliGRAM(s) Oral daily    MEDICATIONS  (PRN):  furosemide    Tablet 40 milliGRAM(s) Oral daily PRN leg swelling  morphine  IR 30 milliGRAM(s) Oral daily PRN pain      Review of systems:    Constitutional: as per HPI  Eyes: No eye pain or discharge  ENMT:  No difficulty hearing; No sinus or throat pain  Neck: No pain or stiffness  Respiratory: No cough, wheezing, chills or hemoptysis  Cardiovascular: No chest pain, palpitations, shortness of breath, dyspnea on exertion  Gastrointestinal: No abdominal pain, nausea, vomiting or hematemesis; No diarrhea or constipation.   Genitourinary: No dysuria, frequency, hematuria or incontinence  Neurological: As per HPI  Skin: No rashes or lesions   Endocrine: No heat or cold intolerance; No hair loss  Musculoskeletal: No joint pain or swelling  Psychiatric: No depression, anxiety, mood swings  Heme/Lymph: No easy bruising or bleeding gums    Vital Signs Last 24 Hrs  T(C): 36.7 (12 Mar 2021 08:24), Max: 37.1 (12 Mar 2021 02:23)  T(F): 98 (12 Mar 2021 08:24), Max: 98.8 (12 Mar 2021 02:23)  HR: 92 (12 Mar 2021 08:24) (88 - 92)  BP: 135/58 (12 Mar 2021 08:24) (129/62 - 135/58)  BP(mean): --  RR: 20 (12 Mar 2021 08:24) (20 - 20)  SpO2: 100% (12 Mar 2021 08:24) (98% - 100%)    Examination:  General:  Appearance is consistent with chronologic age.  No abnormal facies. Grossly cachectic  Cognitive/Language:  The patient is oriented to person, place, time and date.  Recent and remote memory intact.  Fund of knowledge is intact and normal.  Nondysarthric.    Eyes: EOMI w/o nystagmus.  PERRL.  No ptosis/weakness of eyelid closure.    Face:  Facial sensation normal V1 - 3, no facial asymmetry.   Ears/Nose/Throat:  Hearing grossly intact b/l.  Palate elevates midline.  Tongue and uvula midline. No fasciculations of tongue  Motor examination:   Severe muscle wasting upper and lower extremities. Involuntary slow high frequency movements present predominately in UE. No tenderness, twitching, tremors.  Formal Muscle Strength Testing: (MRC grade R/L) 5/5 UE; 5/5 LE.  No observable drift.  Reflexes:   2+ bilateral biceps, triceps, brachioradialis, patella and Achilles.  Plantar response downgoing b/l.  Clonus absent.  Sensory examination:   Intact to light touch        Labs:   CBC Full  -  ( 12 Mar 2021 05:50 )  WBC Count : 9.33 K/uL  RBC Count : 4.72 M/uL  Hemoglobin : 10.3 g/dL  Hematocrit : 33.9 %  Platelet Count - Automated : 324 K/uL  Mean Cell Volume : 71.8 fL  Mean Cell Hemoglobin : 21.8 pg  Mean Cell Hemoglobin Concentration : 30.4 g/dL  Auto Neutrophil # : 6.52 K/uL  Auto Lymphocyte # : 1.50 K/uL  Auto Monocyte # : 0.95 K/uL  Auto Eosinophil # : 0.28 K/uL  Auto Basophil # : 0.06 K/uL  Auto Neutrophil % : 69.9 %  Auto Lymphocyte % : 16.1 %  Auto Monocyte % : 10.2 %  Auto Eosinophil % : 3.0 %  Auto Basophil % : 0.6 %        136  |  98  |  27<H>  ----------------------------<  195<H>  4.3   |  27  |  1.2    Ca    9.6      12 Mar 2021 05:50    TPro  8.5<H>  /  Alb  4.1  /  TBili  0.5  /  DBili  x   /  AST  25  /  ALT  28  /  AlkPhos  142<H>  0312    LIVER FUNCTIONS - ( 12 Mar 2021 05:50 )  Alb: 4.1 g/dL / Pro: 8.5 g/dL / ALK PHOS: 142 U/L / ALT: 28 U/L / AST: 25 U/L / GGT: x             Urinalysis Basic - ( 12 Mar 2021 09:57 )    Color: Light Yellow / Appearance: Clear / S.014 / pH: x  Gluc: x / Ketone: Negative  / Bili: Negative / Urobili: <2 mg/dL   Blood: x / Protein: Trace / Nitrite: Negative   Leuk Esterase: Negative / RBC: x / WBC x   Sq Epi: x / Non Sq Epi: x / Bacteria: x          Neuroimaging:  NCHCT: CT Head No Cont:   EXAM:  CT BRAIN            PROCEDURE DATE:  2021            INTERPRETATION:  Clinical History / Reason for exam: Ballismus    Technique: Noncontrast head CT.  Contiguous unenhanced CT axial images of the head from the base to the vertex with coronal and sagittal reformats. The scan was repeated once due to patient motion.    Comparison: CT head 2020    Findings:    The study is limited due to motion artifact.    The ventricles and cortical sulci demonstrates stable mild/moderate atrophic changes.    There is no gross evidence of acute intracranial hemorrhage, extra-axial fluid collection or midline shift.    The visualized paranasal sinuses and mastoids are grossly clear.    IMPRESSION:    Motion limited study demonstrates no gross evidence of acute intracranial abnormality.              DALLAS LAZARO MD; Attending Radiologist  This document has been electronically signed. Mar 12 2021  8:44AM (21 @ 06:51)      21 @ 13:18       Neurology Consult    Patient is a 65y old  Male who presents with a chief complaint of involuntary movements    HPI:  65 year old male has medical history of  CAD s/p MI, PCI/CABG?, CHFrEF (15-20%), has ICD, HTN, Celiac disease, DM, neuropathy, chronic b/l LE ulcers presents with complaints of uncontrollable movements of the upper and lower extremities. Patient has had these movements for 1- 2 years now, it randomly comes and resolves on its own typically lasts about 15-20 min, where his movements are purposeless usually occurring every 2 months.  No known exacerbating or relieving factors. Of note, patient reports of losing about 30 lbs in last 6-8 months. Patient was adopted, unknown family history w/ 1 daughter NC.  Pt biological mother  in her 50s of unclear reasons.  Pt has h/o suspected Celiac Ds w/ GI symptoms in the past unclear if biopsy proven- had been on gluten-free diet until 2 years ago when he stopped.  Denies any recent large gluten exposures but symptoms worse last night after meal at his house where he hosted a gathering.  Denies any GI symptoms currently.  Denies any new medications or wide variations in glucose.  has insulin pump currently.  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  Wife reports increased caffeine intake recently but no other changes to eating habits.  Denies any personality changes or behavioral changes other then more short-tempered which she attributes to multiple hospitalizations.  No LOC, staring spells or incoordination/ataxia.  Pt reports most episodes typically at night that are unwitnessed by wife.  Pt denies any recent toxic exposure.  Denies any recent travel, insect/tick bites or rashes.  Denies any cancer history.  Denies any muscle twitching.  no HA/neck/lower back pain.  Had stopped cymbalta in the past on assumption it was medication related but made no difference so pt restarted his cymbalta for his neuropathy.    Patient otherwise denies Nausea, vomiting, fever, chills, headaches, SOB, chest pain, abdominal pain, muscle weakness, lower extremity swelling, urinary or bowel habit changes. In ED CTH negative for acute bleed. On physical exam, random upper extremity movements notes, little bit lower movement. Muscle wasting present in both hands, and both legs. and chronic BLE skin ulcerations. Patient is admitted to medicine team for further neurological work up.  Has had problems ambulating since in/out of hospitals due to multiple orthopedic surgeries in knees and hips and attributes weight loss and weakness to deconditioning.      [employer] device [employer]  Model # [id number]  Serial # [id number]  implanted on  2020  phone # [phone]     (12 Mar 2021 11:18)    PAST MEDICAL & SURGICAL HISTORY:  Celiac disease  Diabetic neuropathy  STEMI (ST elevation myocardial infarction)  Diverticulitis  CHF (congestive heart failure) s/p Biventricular PPM  EF ~ 25%  HTN (hypertension)  Diabetes on insulin pump  HLD (hyperlipidemia)  Osteoarthritis  Atherosclerosis of coronary artery  CAD (coronary artery disease) s/p PTCA in , s/p CABG x 1    History of open reduction and internal fixation (ORIF) procedure  Surgery, elective  Right shoulder  Surgery, elective  right knee wound debridement  S/P TKR (total knee replacement), right  with infection Mrsa   per pt he was cleared from MRSA infection  Fixation hardware in foot LEFT    FAMILY HISTORY:  Family history of allergies  daughter NC  adopted- unknown biologic family hx  Family history of heart disease (Mother)?    Social History: (-) x 3    Allergies  ACE inhibitors (Hives)  carvedilol (Other)  enalapril (Hives)  Entresto (Other)    Intolerances    MEDICATIONS  (STANDING):  aspirin enteric coated 81 milliGRAM(s) Oral daily  atorvastatin 80 milliGRAM(s) Oral at bedtime  chlorhexidine 4% Liquid 1 Application(s) Topical <User Schedule>  clonazePAM  Tablet 0.5 milliGRAM(s) Oral three times a day  digoxin     Tablet 125 MICROGram(s) Oral daily  DULoxetine 20 milliGRAM(s) Oral daily  enoxaparin Injectable 40 milliGRAM(s) SubCutaneous at bedtime  metoprolol succinate ER 25 milliGRAM(s) Oral daily  prasugrel 10 milliGRAM(s) Oral daily  spironolactone 25 milliGRAM(s) Oral daily    MEDICATIONS  (PRN):  furosemide    Tablet 40 milliGRAM(s) Oral daily PRN leg swelling  morphine  IR 30 milliGRAM(s) Oral daily PRN pain    Review of systems:    Constitutional: as per HPI  Eyes: No eye pain or discharge  ENMT:  No difficulty hearing; No sinus or throat pain  Neck: No pain or stiffness  Respiratory: No cough, wheezing, chills or hemoptysis  Cardiovascular: No chest pain, palpitations, shortness of breath, dyspnea on exertion  Gastrointestinal: No abdominal pain, nausea, vomiting or hematemesis; No diarrhea or constipation.   Genitourinary: No dysuria, frequency, hematuria or incontinence  Neurological: As per HPI  Skin: No rashes or lesions   Endocrine: No heat or cold intolerance; No hair loss  Musculoskeletal: No joint pain or swelling  Psychiatric: No depression, anxiety, mood swings  Heme/Lymph: No easy bruising or bleeding gums    Vital Signs Last 24 Hrs  T(C): 36.7 (12 Mar 2021 08:24), Max: 37.1 (12 Mar 2021 02:23)  T(F): 98 (12 Mar 2021 08:24), Max: 98.8 (12 Mar 2021 02:23)  HR: 92 (12 Mar 2021 08:24) (88 - 92)  BP: 135/58 (12 Mar 2021 08:24) (129/62 - 135/58)  BP(mean): --  RR: 20 (12 Mar 2021 08:24) (20 - 20)  SpO2: 100% (12 Mar 2021 08:24) (98% - 100%)    Examination:  General:  Appearance is consistent with chronologic age.  No abnormal facies. Grossly cachectic  Cognitive/Language:  The patient is oriented to person, place, time and date.  Recent and remote memory intact.  Fund of knowledge is intact and normal.  Nondysarthric.    Eyes: EOMI w/ vertical nystagmus.  PERRL.  No ptosis/weakness of eyelid closure.    Face:  Facial sensation normal V1 - 3, no facial asymmetry.   Ears/Nose/Throat:  Hearing grossly intact b/l.  Palate elevates midline.  Tongue and uvula midline. No fasciculations of tongue.  no jaw or tongue movements.    Motor examination:   Severe muscle wasting upper and lower extremities.  Involuntary slow writhing movements interspersed with ballistic movements present predominately in UE > LE. No tenderness, twitching, tremors.  Increased tone b/l LE.  (+) postural instability.  no dystonias.    Formal Muscle Strength Testing: (MRC grade R/L) 5/5 UE; 5/5 LE.   Reflexes:   1+ bilateral biceps, triceps, brachioradialis, 0+ patella and Achilles.  Plantar response downgoing b/l.  Clonus absent.  Sensory examination:   Intact to light touch throughout  Gait N/A    Labs:   CBC Full  -  ( 12 Mar 2021 05:50 )  WBC Count : 9.33 K/uL  RBC Count : 4.72 M/uL  Hemoglobin : 10.3 g/dL  Hematocrit : 33.9 %  Platelet Count - Automated : 324 K/uL  Mean Cell Volume : 71.8 fL  Mean Cell Hemoglobin : 21.8 pg  Mean Cell Hemoglobin Concentration : 30.4 g/dL  Auto Neutrophil # : 6.52 K/uL  Auto Lymphocyte # : 1.50 K/uL  Auto Monocyte # : 0.95 K/uL  Auto Eosinophil # : 0.28 K/uL  Auto Basophil # : 0.06 K/uL  Auto Neutrophil % : 69.9 %  Auto Lymphocyte % : 16.1 %  Auto Monocyte % : 10.2 %  Auto Eosinophil % : 3.0 %  Auto Basophil % : 0.6 %        136  |  98  |  27<H>  ----------------------------<  195<H>  4.3   |  27  |  1.2    Ca    9.6      12 Mar 2021 05:50    TPro  8.5<H>  /  Alb  4.1  /  TBili  0.5  /  DBili  x   /  AST  25  /  ALT  28  /  AlkPhos  142<H>      LIVER FUNCTIONS - ( 12 Mar 2021 05:50 )  Alb: 4.1 g/dL / Pro: 8.5 g/dL / ALK PHOS: 142 U/L / ALT: 28 U/L / AST: 25 U/L / GGT: x           Urinalysis Basic - ( 12 Mar 2021 09:57 )    Color: Light Yellow / Appearance: Clear / S.014 / pH: x  Gluc: x / Ketone: Negative  / Bili: Negative / Urobili: <2 mg/dL   Blood: x / Protein: Trace / Nitrite: Negative   Leuk Esterase: Negative / RBC: x / WBC x   Sq Epi: x / Non Sq Epi: x / Bacteria: x    Neuroimaging:  NCHCT: CT Head No Cont:   EXAM:  CT BRAIN          PROCEDURE DATE:  2021      INTERPRETATION:  Clinical History / Reason for exam: Ballismus    Technique: Noncontrast head CT.  Contiguous unenhanced CT axial images of the head from the base to the vertex with coronal and sagittal reformats. The scan was repeated once due to patient motion.    Comparison: CT head 2020    Findings:    The study is limited due to motion artifact.    The ventricles and cortical sulci demonstrates stable mild/moderate atrophic changes.    There is no gross evidence of acute intracranial hemorrhage, extra-axial fluid collection or midline shift.    The visualized paranasal sinuses and mastoids are grossly clear.    IMPRESSION:    Motion limited study demonstrates no gross evidence of acute intracranial abnormality.    DALLAS LAZARO MD; Attending Radiologist  This document has been electronically signed. Mar 12 2021  8:44AM (21 @ 06:51)      21 @ 13:18

## 2021-03-12 NOTE — ED ADULT TRIAGE NOTE - CHIEF COMPLAINT QUOTE
pt BIBA for ataxia x 5 hours. pt stated this has happened before but has never lasted this long. pt aaox4 in triage w/ erratic movements. . pt has hx of stents s/p 2 MI's and DM. pt denies any familial hx of carline's disease, parkinson's, MS. pt BIBA for ataxia/General Dystonia x 5 hours. pt stated this has happened before but has never lasted this long. pt aaox4 in triage w/ erratic movements. . pt has hx of stents s/p 2 MI's and DM. pt denies any familial hx of carline's disease, parkinson's, MS. pt denies any promethazine or cough syrup use,

## 2021-03-12 NOTE — ED PROVIDER NOTE - PHYSICAL EXAMINATION
Vital Signs: I have reviewed the initial vital signs.  Constitutional: well-nourished, appears stated age, no acute distress  Cardiovascular: regular rate, regular rhythm, well-perfused extremities  Respiratory: unlabored respiratory effort, clear to auscultation bilaterally  Gastrointestinal: soft, non-tender abdomen  Musculoskeletal: supple neck, no lower extremity edema. (+) BL LE chronic shin wounds   Integumentary: warm, dry, no rash  Neurologic: awake, alert, (+) uncontrolled movements of all 4 extremities which are not persistent, cease for several seconds during ascultation of heart and lungs   Psychiatric: appropriate mood, appropriate affect

## 2021-03-12 NOTE — ED PROVIDER NOTE - ATTENDING CONTRIBUTION TO CARE
I personally evaluated the patient. I reviewed the Resident’s or Physician Assistant’s note (as assigned above), and agree with the findings and plan except as documented in my note.  65 M with h xof CAD s/p MI, PCI, HTN, DM, neuropathy, chronic b/l LE ulcers presents with complaints of uncontrollable movements of the extremities. States that he occasionally gets this intermittently for the past 2 years but the episodes last 30 mins to 1 hour and resolves. This episode has been going on for the past 5 hours without resolution. Denies falls/trauma, f/c, headache, n/v. VS reviewed, pt non-toxic appearing, NAD. Head ncat, MMM, neck supple, normal ROM, normal s1s2 without any murmurs, Lungs CTAB with normal work of breathing. abd +BS, s/nd/nt, extremities with chronic healing ulcers of b/l LE, AAOx 3, CN II to XII wnl, normal motor, sensation exam. + involuntary, chorea-like movements of all extremities. No acute skin rashes. Plan is meds as needed, labs, imaging and dispo accordingly.

## 2021-03-12 NOTE — ED PROVIDER NOTE - PROGRESS NOTE DETAILS
Pt very restless, Versed 5 mg IV given as transport here for CT. Ballismus noted in pt while sleeping despite valium 10 mg. Ordered labs and CT head. Authored by Dr. Spann: s/o to me by dr jolly - 65M, cad, dm, htn, neuropathy, now with choreaform movements. involuntary, uncontrolled. acute on chronic.  pt currently resting with occasional chorea form movts. especially BUE.  chronic BLE ulcers. lungs clear. _+ muscular wasting.  pending labs and imaging. Padmini- Signout from Dr. Briggs and Dr. Louis pending labs/imaging results. Patient is a 65M with PMH of CAD, DM, HTN, chronic BLE ulcers, neuropathy, and CHFrEF (15-20%) presenting to ED with involuntary chorea-form movements. Patient unable to control them, movements are of both upper and lower extremities. Patient with occasional chorea form movements in ED. +Muscular wasting noted upon examination (particularly thenar eminence), +chronic BLE skin ulcerations. Patient s/p Versed 06:30AM, resting upon reassessment. Hx of covid infection 02/21. Given Valium as well, CTH with motion limited study demonstrates no gross evidence of acute intracranial abnormality.

## 2021-03-12 NOTE — ED ADULT NURSE NOTE - CHIEF COMPLAINT QUOTE
pt BIBA for ataxia/General Dystonia x 5 hours. pt stated this has happened before but has never lasted this long. pt aaox4 in triage w/ erratic movements. . pt has hx of stents s/p 2 MI's and DM. pt denies any familial hx of carline's disease, parkinson's, MS. pt denies any promethazine or cough syrup use,

## 2021-03-12 NOTE — H&P ADULT - ASSESSMENT
65 year old male has medical history of  CAD s/p MI, PCI/CABG?, CHFrEF (15-20%), has ICD, HTN, Celiac disease, DM, neuropathy, chronic b/l LE ulcers presents with complaints of uncontrollable movements of the upper and lower extremities. Patient has had these movements for over a year now, it randomly comes and resolves on its own. It lasts about 15-20 min, where his movements are purposeless. No known exacerbating or relieving factors. Off note, patient reports of losing about 30 lbs in last 6-8 months. Patient was adopted, unknown family history. Patient otherwise denies Nausea, vomiting, fever, chills, headaches, SOB, chest pain, abdominal pain, muscle weakness, lower extremity swelling, urinary or bowel habit changes. In ED CTH negative for acute bleed. On physical exam, random upper extremity movements notes, little bit lower movement. Muscle wasting present in both hands, and both legs. and chronic BLE skin ulcerations. Patient is admitted to medicine team for further neurological work up. 65 year old male has medical history of  CAD s/p MI, PCI/CABG?, CHFrEF (15-20%), has ICD, HTN, Celiac disease, DM, neuropathy, chronic b/l LE ulcers presents with complaints of uncontrollable movements of the upper and lower extremities. Patient has had these movements for over a year now, it randomly comes and resolves on its own. It lasts about 15-20 min, where his movements are purposeless. No known exacerbating or relieving factors. Off note, patient reports of losing about 30 lbs in last 6-8 months. Patient was adopted, unknown family history. In ED CTH negative for acute bleed. On physical exam, random upper extremity movements notes, little bit lower movement. Muscle wasting present in both hands, and both legs. and chronic BLE skin ulcerations. Patient is admitted to medicine team for further neurological work up.    [employer] device [employer]  Model # [id number]  Serial # [id number]  implanted on  February 11, 2020  phone # [phone]    # Uncontrolled movement, chorea movement disorder, Neurocognitive disorder  - CTH negative, unknown family history, patient was adopted, while in CT head, patient was given ativan he was asleep, still was having movement.  - symptoms started about a year, No personality changes per wife  - History of celiac disease, has been taking gluten, no diarrhea  - pending neurology to decide if MRI is needed, has ICD, Fu with their recommendation  - will send immune work up, as well as malignancy work up. (upep/spep/immunofixation, ESR, celiac antibodies, paraneoplastic work up, ACE, ceruloplasmin level, copper levels, sjogren, sarcoidosis, lyme titers, work up)  - Routine EEG,  - clonazepam Tablet 0.5 milliGRAM(s) Oral three times a day to ease the movement  - fu neurology recommendation if MRI is needed    # CAD, s/p PCI, CABG?  - aspirin enteric coated 81 milliGRAM(s) Oral daily  - prasugrel 10 milliGRAM(s) Oral daily    # HLD  - atorvastatin 80 milliGRAM(s) Oral at bedtime    # Neuropathy of lower ext  - DULoxetine 20 milliGRAM(s) Oral daily    # CHFrEF (15-20%)  - metoprolol succinate ER 25 milliGRAM(s) Oral daily  - spironolactone 25 milliGRAM(s) Oral daily  - has ICD  - digoxin Tablet 125 MICROGram(s) Oral daily  - PRN LASIX furosemide    Tablet 40 milliGRAM(s) Oral daily PRN leg swelling    # Left knee replacement  - morphine  IR 30 milliGRAM(s) Oral daily PRN pain        #) MISC  Activity: IAT  DVT ppx: lovenox   GI ppx: protonix  Diet: DASH/TLC gluten free diet  Code: Full  Dispo: medical optimization  CHG wash 65 year old male has medical history of  CAD s/p MI, PCI/CABG?, CHFrEF (15-20%), has ICD, HTN, Celiac disease, DM, neuropathy, chronic b/l LE ulcers presents with complaints of uncontrollable movements of the upper and lower extremities. Patient has had these movements for over a year now, it randomly comes and resolves on its own. It lasts about 15-20 min, where his movements are purposeless. No known exacerbating or relieving factors. Off note, patient reports of losing about 30 lbs in last 6-8 months. Patient was adopted, unknown family history. In ED CTH negative for acute bleed. On physical exam, random upper extremity movements notes, little bit lower movement. Muscle wasting present in both hands, and both legs. and chronic BLE skin ulcerations. Patient is admitted to medicine team for further neurological work up.    [employer]  Model # [id number]  Serial # [id number]  implanted on  February 11, 2020  phone # [phone]    # Uncontrolled movement, chorea movement disorder, Neurocognitive disorder, celiac induced?  - CTH negative, unknown family history, patient was adopted, while in CT head, patient was given ativan he was asleep, still was having movement.  - symptoms started about a year, No personality changes per wife  - History of celiac disease, has been taking gluten, no diarrhea  - pending neurology to decide if MRI is needed, has ICD, Fu with their recommendation  - will send immune work up, as well as malignancy work up. (upep/spep/immunofixation, ESR, celiac antibodies, paraneoplastic work up, ACE, ceruloplasmin level, copper levels, sjogren, sarcoidosis, lyme titers, work up)  - check b12, folate, thyroid,   - Routine EEG,  - clonazepam Tablet 0.5 milliGRAM(s) Oral three times a day to ease the movement  - fu neurology recommendation if MRI is needed    # CAD, s/p PCI, CABG?  - aspirin enteric coated 81 milliGRAM(s) Oral daily  - prasugrel 10 milliGRAM(s) Oral daily    # HLD  - atorvastatin 80 milliGRAM(s) Oral at bedtime    # Neuropathy of lower ext  - DULoxetine 20 milliGRAM(s) Oral daily    # CHFrEF (15-20%) - s/p ICD  - metoprolol succinate ER 25 milliGRAM(s) Oral daily  - spironolactone 25 milliGRAM(s) Oral daily  - has ICD  - digoxin Tablet 125 MICROGram(s) Oral daily  - PRN LASIX furosemide    Tablet 40 milliGRAM(s) Oral daily PRN leg swelling    # DM  - monitor fs,   - has insulin pump    # Left knee replacement  - morphine  IR 30 milliGRAM(s) Oral daily PRN pain        #) MISC  Activity: IAT  DVT ppx: lovenox   GI ppx: protonix  Diet: DASH/TLC gluten free diet  Code: Full  Dispo: medical optimization  CH[facility] wash

## 2021-03-13 LAB
ALBUMIN SERPL ELPH-MCNC: 3.8 G/DL — SIGNIFICANT CHANGE UP (ref 3.5–5.2)
ALP SERPL-CCNC: 134 U/L — HIGH (ref 30–115)
ALT FLD-CCNC: 32 U/L — SIGNIFICANT CHANGE UP (ref 0–41)
AMPHET UR-MCNC: NEGATIVE — SIGNIFICANT CHANGE UP
ANION GAP SERPL CALC-SCNC: 11 MMOL/L — SIGNIFICANT CHANGE UP (ref 7–14)
AST SERPL-CCNC: 32 U/L — SIGNIFICANT CHANGE UP (ref 0–41)
B BURGDOR C6 AB SER-ACNC: NEGATIVE — SIGNIFICANT CHANGE UP
B BURGDOR IGG+IGM SER-ACNC: 0.09 INDEX — SIGNIFICANT CHANGE UP (ref 0.01–0.89)
BARBITURATES UR SCN-MCNC: NEGATIVE — SIGNIFICANT CHANGE UP
BASOPHILS # BLD AUTO: 0.1 K/UL — SIGNIFICANT CHANGE UP (ref 0–0.2)
BASOPHILS NFR BLD AUTO: 1.1 % — HIGH (ref 0–1)
BENZODIAZ UR-MCNC: POSITIVE
BILIRUB SERPL-MCNC: 0.9 MG/DL — SIGNIFICANT CHANGE UP (ref 0.2–1.2)
BUN SERPL-MCNC: 23 MG/DL — HIGH (ref 10–20)
CALCIUM SERPL-MCNC: 9.5 MG/DL — SIGNIFICANT CHANGE UP (ref 8.5–10.1)
CERULOPLASMIN SERPL-MCNC: 31 MG/DL — HIGH (ref 15–30)
CHLORIDE SERPL-SCNC: 101 MMOL/L — SIGNIFICANT CHANGE UP (ref 98–110)
CHOLEST SERPL-MCNC: 121 MG/DL — SIGNIFICANT CHANGE UP
CO2 SERPL-SCNC: 26 MMOL/L — SIGNIFICANT CHANGE UP (ref 17–32)
COCAINE METAB.OTHER UR-MCNC: NEGATIVE — SIGNIFICANT CHANGE UP
CREAT SERPL-MCNC: 1 MG/DL — SIGNIFICANT CHANGE UP (ref 0.7–1.5)
CRP SERPL-MCNC: 7 MG/L — HIGH
CULTURE RESULTS: SIGNIFICANT CHANGE UP
DRUG SCREEN 1, URINE RESULT: SIGNIFICANT CHANGE UP
EOSINOPHIL # BLD AUTO: 0.32 K/UL — SIGNIFICANT CHANGE UP (ref 0–0.7)
EOSINOPHIL NFR BLD AUTO: 3.6 % — SIGNIFICANT CHANGE UP (ref 0–8)
FERRITIN SERPL-MCNC: 27 NG/ML — LOW (ref 30–400)
FOLATE SERPL-MCNC: 12.9 NG/ML — SIGNIFICANT CHANGE UP
GLUCOSE SERPL-MCNC: 136 MG/DL — HIGH (ref 70–99)
HCT VFR BLD CALC: 37.1 % — LOW (ref 42–52)
HDLC SERPL-MCNC: 39 MG/DL — LOW
HGB BLD-MCNC: 11 G/DL — LOW (ref 14–18)
IMM GRANULOCYTES NFR BLD AUTO: 0.3 % — SIGNIFICANT CHANGE UP (ref 0.1–0.3)
LIPID PNL WITH DIRECT LDL SERPL: 72 MG/DL — SIGNIFICANT CHANGE UP
LYMPHOCYTES # BLD AUTO: 1.16 K/UL — LOW (ref 1.2–3.4)
LYMPHOCYTES # BLD AUTO: 13 % — LOW (ref 20.5–51.1)
MAGNESIUM SERPL-MCNC: 1.9 MG/DL — SIGNIFICANT CHANGE UP (ref 1.8–2.4)
MCHC RBC-ENTMCNC: 21.4 PG — LOW (ref 27–31)
MCHC RBC-ENTMCNC: 29.6 G/DL — LOW (ref 32–37)
MCV RBC AUTO: 72 FL — LOW (ref 80–94)
METHADONE UR-MCNC: NEGATIVE — SIGNIFICANT CHANGE UP
MONOCYTES # BLD AUTO: 0.63 K/UL — HIGH (ref 0.1–0.6)
MONOCYTES NFR BLD AUTO: 7 % — SIGNIFICANT CHANGE UP (ref 1.7–9.3)
NEUTROPHILS # BLD AUTO: 6.7 K/UL — HIGH (ref 1.4–6.5)
NEUTROPHILS NFR BLD AUTO: 75 % — SIGNIFICANT CHANGE UP (ref 42.2–75.2)
NON HDL CHOLESTEROL: 82 MG/DL — SIGNIFICANT CHANGE UP
NRBC # BLD: 0 /100 WBCS — SIGNIFICANT CHANGE UP (ref 0–0)
OPIATES UR-MCNC: POSITIVE
PCP UR-MCNC: NEGATIVE — SIGNIFICANT CHANGE UP
PHOSPHATE SERPL-MCNC: 3.7 MG/DL — SIGNIFICANT CHANGE UP (ref 2.1–4.9)
PLATELET # BLD AUTO: 330 K/UL — SIGNIFICANT CHANGE UP (ref 130–400)
POTASSIUM SERPL-MCNC: 4.6 MMOL/L — SIGNIFICANT CHANGE UP (ref 3.5–5)
POTASSIUM SERPL-SCNC: 4.6 MMOL/L — SIGNIFICANT CHANGE UP (ref 3.5–5)
PROPOXYPHENE QUALITATIVE URINE RESULT: NEGATIVE — SIGNIFICANT CHANGE UP
PROT SERPL-MCNC: 7.8 G/DL — SIGNIFICANT CHANGE UP (ref 6–8)
RBC # BLD: 5.15 M/UL — SIGNIFICANT CHANGE UP (ref 4.7–6.1)
RBC # FLD: 21.2 % — HIGH (ref 11.5–14.5)
SODIUM SERPL-SCNC: 138 MMOL/L — SIGNIFICANT CHANGE UP (ref 135–146)
SPECIMEN SOURCE: SIGNIFICANT CHANGE UP
T3 SERPL-MCNC: 77 NG/DL — LOW (ref 80–200)
T4 AB SER-ACNC: 4.6 UG/DL — SIGNIFICANT CHANGE UP (ref 4.6–12)
THC UR QL: NEGATIVE — SIGNIFICANT CHANGE UP
TRIGL SERPL-MCNC: 66 MG/DL — SIGNIFICANT CHANGE UP
TSH SERPL-MCNC: 1.68 UIU/ML — SIGNIFICANT CHANGE UP (ref 0.27–4.2)
VIT B12 SERPL-MCNC: 412 PG/ML — SIGNIFICANT CHANGE UP (ref 232–1245)
WBC # BLD: 8.94 K/UL — SIGNIFICANT CHANGE UP (ref 4.8–10.8)
WBC # FLD AUTO: 8.94 K/UL — SIGNIFICANT CHANGE UP (ref 4.8–10.8)

## 2021-03-13 PROCEDURE — 99232 SBSQ HOSP IP/OBS MODERATE 35: CPT

## 2021-03-13 RX ADMIN — Medication 0.5 MILLIGRAM(S): at 06:27

## 2021-03-13 RX ADMIN — PRASUGREL 10 MILLIGRAM(S): 5 TABLET, FILM COATED ORAL at 12:05

## 2021-03-13 RX ADMIN — ENOXAPARIN SODIUM 40 MILLIGRAM(S): 100 INJECTION SUBCUTANEOUS at 22:32

## 2021-03-13 RX ADMIN — Medication 0.5 MILLIGRAM(S): at 22:38

## 2021-03-13 RX ADMIN — Medication 25 MILLIGRAM(S): at 06:27

## 2021-03-13 RX ADMIN — ATORVASTATIN CALCIUM 80 MILLIGRAM(S): 80 TABLET, FILM COATED ORAL at 22:32

## 2021-03-13 RX ADMIN — SPIRONOLACTONE 25 MILLIGRAM(S): 25 TABLET, FILM COATED ORAL at 06:27

## 2021-03-13 RX ADMIN — Medication 81 MILLIGRAM(S): at 12:05

## 2021-03-13 RX ADMIN — Medication 0.5 MILLIGRAM(S): at 14:14

## 2021-03-13 RX ADMIN — Medication 125 MICROGRAM(S): at 06:27

## 2021-03-13 RX ADMIN — DULOXETINE HYDROCHLORIDE 20 MILLIGRAM(S): 30 CAPSULE, DELAYED RELEASE ORAL at 12:05

## 2021-03-13 NOTE — PROGRESS NOTE ADULT - SUBJECTIVE AND OBJECTIVE BOX
Neurology Progress Note    Interval History:  patient is examined at the bedside, he is doing well, no current complaints. No more episodes of abnormal movements noted.     HPI:  65 year old male has medical history of  CAD s/p MI, PCI/CABG?, CHFrEF (15-20%), has ICD, HTN, Celiac disease, DM, neuropathy, chronic b/l LE ulcers presents with complaints of uncontrollable movements of the upper and lower extremities. Patient has had these movements for over a year now, it randomly comes and resolves on its own. It lasts about 15-20 min, where his movements are purposeless. No known exacerbating or relieving factors. Off note, patient reports of losing about 30 lbs in last 6-8 months. Patient was adopted, unknown family history. Patient otherwise denies Nausea, vomiting, fever, chills, headaches, SOB, chest pain, abdominal pain, muscle weakness, lower extremity swelling, urinary or bowel habit changes. In ED CTH negative for acute bleed. On physical exam, random upper extremity movements notes, little bit lower movement. Muscle wasting present in both hands, and both legs. and chronic BLE skin ulcerations. Patient is admitted to medicine team for further neurological work up.    [employer] device [employer]  Model # [id number]  Serial # [id number]  implanted on  2020  phone # [phone]     (12 Mar 2021 11:18)      PAST MEDICAL & SURGICAL HISTORY:  Celiac disease    Diabetic neuropathy    STEMI (ST elevation myocardial infarction)    Diverticulitis    History of celiac disease    CHF (congestive heart failure)  EF ~ 25%    HTN (hypertension)    Diabetes  on insulin pump    Blood clot due to device, implant, or graft  was on blood thinners    HLD (hyperlipidemia)    Osteoarthritis    Atherosclerosis of coronary artery  CAD (coronary artery disease)    Status post percutaneous transluminal coronary angioplasty  in     History of open reduction and internal fixation (ORIF) procedure    Surgery, elective  Right shoulder    Surgery, elective  right knee wound debridement    S/P TKR (total knee replacement), right  with infection Mrsa   per pt he was cleared from MRSA infection    S/P CABG x 1      Stented coronary artery  10/18 heart attack  INFERIOR WALL MI    Other postprocedural status  Fixation hardware in foot LEFT            Medications:  aspirin enteric coated 81 milliGRAM(s) Oral daily  atorvastatin 80 milliGRAM(s) Oral at bedtime  chlorhexidine 4% Liquid 1 Application(s) Topical <User Schedule>  clonazePAM  Tablet 0.5 milliGRAM(s) Oral three times a day  digoxin     Tablet 125 MICROGram(s) Oral daily  DULoxetine 20 milliGRAM(s) Oral daily  enoxaparin Injectable 40 milliGRAM(s) SubCutaneous at bedtime  furosemide    Tablet 40 milliGRAM(s) Oral daily PRN  metoprolol succinate ER 25 milliGRAM(s) Oral daily  morphine  IR 30 milliGRAM(s) Oral daily PRN  prasugrel 10 milliGRAM(s) Oral daily  spironolactone 25 milliGRAM(s) Oral daily      Vital Signs Last 24 Hrs  T(C): 36.2 (13 Mar 2021 13:39), Max: 36.4 (13 Mar 2021 06:12)  T(F): 97.1 (13 Mar 2021 13:39), Max: 97.6 (13 Mar 2021 06:12)  HR: 61 (13 Mar 2021 13:39) (61 - 82)  BP: 104/59 (13 Mar 2021 13:39) (104/59 - 132/56)  BP(mean): --  RR: 18 (13 Mar 2021 13:39) (18 - 18)  SpO2: --    Neurological Exam:   Mental status: Awake, alert and oriented x3.  Recent and remote memory intact.  Naming, repetition and comprehension intact.  Attention/concentration intact.  No dysarthria, no aphasia.  Fund of knowledge appropriate.    Cranial nerves: Pupils equally round and reactive to light, visual fields full, no nystagmus, extraocular muscles intact, V1 through V3 intact bilaterally and symmetric, face symmetric, hearing intact to finger rub, palate elevation symmetric, tongue was midline.  Motor:  MRC grading 5/5 b/l UE/LE.   strength 5/5.  Normal tone and bulk.  No abnormal movements.    Sensation: Intact to light touch, proprioception, and pinprick.   Coordination: No dysmetria on finger-to-nose and heel-to-shin.  No dysdiadokinesia.  Reflexes: 1+ in bilateral UE/LE, downgoing toes bilaterally. (-) Montemayor.  Gait: not tested    Labs:  CBC Full  -  ( 13 Mar 2021 08:11 )  WBC Count : 8.94 K/uL  RBC Count : 5.15 M/uL  Hemoglobin : 11.0 g/dL  Hematocrit : 37.1 %  Platelet Count - Automated : 330 K/uL  Mean Cell Volume : 72.0 fL  Mean Cell Hemoglobin : 21.4 pg  Mean Cell Hemoglobin Concentration : 29.6 g/dL  Auto Neutrophil # : 6.70 K/uL  Auto Lymphocyte # : 1.16 K/uL  Auto Monocyte # : 0.63 K/uL  Auto Eosinophil # : 0.32 K/uL  Auto Basophil # : 0.10 K/uL  Auto Neutrophil % : 75.0 %  Auto Lymphocyte % : 13.0 %  Auto Monocyte % : 7.0 %  Auto Eosinophil % : 3.6 %  Auto Basophil % : 1.1 %        138  |  101  |  23<H>  ----------------------------<  136<H>  4.6   |  26  |  1.0    Ca    9.5      13 Mar 2021 08:11  Phos  3.7       Mg     1.9         TPro  7.8  /  Alb  3.8  /  TBili  0.9  /  DBili  x   /  AST  32  /  ALT  32  /  AlkPhos  134<H>      LIVER FUNCTIONS - ( 13 Mar 2021 08:11 )  Alb: 3.8 g/dL / Pro: 7.8 g/dL / ALK PHOS: 134 U/L / ALT: 32 U/L / AST: 32 U/L / GGT: x             Urinalysis Basic - ( 12 Mar 2021 09:57 )    Color: Light Yellow / Appearance: Clear / S.014 / pH: x  Gluc: x / Ketone: Negative  / Bili: Negative / Urobili: <2 mg/dL   Blood: x / Protein: Trace / Nitrite: Negative   Leuk Esterase: Negative / RBC: x / WBC x   Sq Epi: x / Non Sq Epi: x / Bacteria: x        Assessment:  This is a 65y Male w/ h/o     Plan: Neurology Progress Note    Interval History:  patient is examined at the bedside, he is doing well, no current complaints. No more episodes of abnormal movements noted.  Felt a little groggy with clonazepam this AM.      HPI:  65 year old male has medical history of  CAD s/p MI, PCI/CABG?, CHFrEF (15-20%), has ICD, HTN, Celiac disease, DM, neuropathy, chronic b/l LE ulcers presents with complaints of uncontrollable movements of the upper and lower extremities. Patient has had these movements for over a year now, it randomly comes and resolves on its own. It lasts about 15-20 min, where his movements are purposeless. No known exacerbating or relieving factors. Off note, patient reports of losing about 30 lbs in last 6-8 months. Patient was adopted, unknown family history. Patient otherwise denies Nausea, vomiting, fever, chills, headaches, SOB, chest pain, abdominal pain, muscle weakness, lower extremity swelling, urinary or bowel habit changes. In ED CTH negative for acute bleed. On physical exam, random upper extremity movements notes, little bit lower movement. Muscle wasting present in both hands, and both legs. and chronic BLE skin ulcerations. Patient is admitted to medicine team for further neurological work up.    [employer] device [employer]  Model # [id number]  Serial # [id number]  implanted on  2020  phone # [phone]     (12 Mar 2021 11:18)      PAST MEDICAL & SURGICAL HISTORY:  Celiac disease    Diabetic neuropathy    STEMI (ST elevation myocardial infarction)    Diverticulitis    History of celiac disease    CHF (congestive heart failure)  EF ~ 25%    HTN (hypertension)    Diabetes  on insulin pump    Blood clot due to device, implant, or graft  was on blood thinners    HLD (hyperlipidemia)    Osteoarthritis    Atherosclerosis of coronary artery  CAD (coronary artery disease)    Status post percutaneous transluminal coronary angioplasty  in     History of open reduction and internal fixation (ORIF) procedure    Surgery, elective  Right shoulder    Surgery, elective  right knee wound debridement    S/P TKR (total knee replacement), right  with infection Mrsa   per pt he was cleared from MRSA infection    S/P CABG x 1      Stented coronary artery  10/18 heart attack  INFERIOR WALL MI    Other postprocedural status  Fixation hardware in foot LEFT            Medications:  aspirin enteric coated 81 milliGRAM(s) Oral daily  atorvastatin 80 milliGRAM(s) Oral at bedtime  chlorhexidine 4% Liquid 1 Application(s) Topical <User Schedule>  clonazePAM  Tablet 0.5 milliGRAM(s) Oral three times a day  digoxin     Tablet 125 MICROGram(s) Oral daily  DULoxetine 20 milliGRAM(s) Oral daily  enoxaparin Injectable 40 milliGRAM(s) SubCutaneous at bedtime  furosemide    Tablet 40 milliGRAM(s) Oral daily PRN  metoprolol succinate ER 25 milliGRAM(s) Oral daily  morphine  IR 30 milliGRAM(s) Oral daily PRN  prasugrel 10 milliGRAM(s) Oral daily  spironolactone 25 milliGRAM(s) Oral daily      Vital Signs Last 24 Hrs  T(C): 36.2 (13 Mar 2021 13:39), Max: 36.4 (13 Mar 2021 06:12)  T(F): 97.1 (13 Mar 2021 13:39), Max: 97.6 (13 Mar 2021 06:12)  HR: 61 (13 Mar 2021 13:39) (61 - 82)  BP: 104/59 (13 Mar 2021 13:39) (104/59 - 132/56)  BP(mean): --  RR: 18 (13 Mar 2021 13:39) (18 - 18)  SpO2: --    Neurological Exam:   Mental status: Awake, alert and oriented x3.  Recent and remote memory intact.  Naming, repetition and comprehension intact.  Attention/concentration intact.  No dysarthria, no aphasia.  Fund of knowledge appropriate.    Cranial nerves: Pupils equally round and reactive to light, visual fields full, no nystagmus, extraocular muscles intact, V1 through V3 intact bilaterally and symmetric, face symmetric, hearing intact to finger rub, palate elevation symmetric, tongue was midline.  Motor:  MRC grading 5/5 b/l UE/LE.   strength 5/5.  Normal tone and bulk.  No abnormal movements.    Sensation: Intact to light touch, proprioception, and pinprick.   Coordination: No dysmetria on finger-to-nose and heel-to-shin.  No dysdiadokinesia.  Reflexes: 1+ in bilateral UE/LE, downgoing toes bilaterally. (-) Montemayor.  Gait: not tested    Labs:  CBC Full  -  ( 13 Mar 2021 08:11 )  WBC Count : 8.94 K/uL  RBC Count : 5.15 M/uL  Hemoglobin : 11.0 g/dL  Hematocrit : 37.1 %  Platelet Count - Automated : 330 K/uL  Mean Cell Volume : 72.0 fL  Mean Cell Hemoglobin : 21.4 pg  Mean Cell Hemoglobin Concentration : 29.6 g/dL  Auto Neutrophil # : 6.70 K/uL  Auto Lymphocyte # : 1.16 K/uL  Auto Monocyte # : 0.63 K/uL  Auto Eosinophil # : 0.32 K/uL  Auto Basophil # : 0.10 K/uL  Auto Neutrophil % : 75.0 %  Auto Lymphocyte % : 13.0 %  Auto Monocyte % : 7.0 %  Auto Eosinophil % : 3.6 %  Auto Basophil % : 1.1 %        138  |  101  |  23<H>  ----------------------------<  136<H>  4.6   |  26  |  1.0    Ca    9.5      13 Mar 2021 08:11  Phos  3.7       Mg     1.9         TPro  7.8  /  Alb  3.8  /  TBili  0.9  /  DBili  x   /  AST  32  /  ALT  32  /  AlkPhos  134<H>  13    LIVER FUNCTIONS - ( 13 Mar 2021 08:11 )  Alb: 3.8 g/dL / Pro: 7.8 g/dL / ALK PHOS: 134 U/L / ALT: 32 U/L / AST: 32 U/L / GGT: x             Urinalysis Basic - ( 12 Mar 2021 09:57 )    Color: Light Yellow / Appearance: Clear / S.014 / pH: x  Gluc: x / Ketone: Negative  / Bili: Negative / Urobili: <2 mg/dL   Blood: x / Protein: Trace / Nitrite: Negative   Leuk Esterase: Negative / RBC: x / WBC x   Sq Epi: x / Non Sq Epi: x / Bacteria: x        Assessment:  This is a 65y Male w/ h/o     Plan:

## 2021-03-13 NOTE — PROGRESS NOTE ADULT - REASON FOR ADMISSION
Spoke to patient and gave recommendations. Patient declined the repeat sodium in one week and said that he will do it at his 6 week follow up appointment. Patient also declined the xray and MRI at this time.   abnormal movements

## 2021-03-13 NOTE — PROGRESS NOTE ADULT - ASSESSMENT
Attending Statement:  65 year old male has medical history of  CAD s/p MI, PCI/CABG?, CHFrEF (15-20%), has ICD, HTN, Celiac disease, DM, neuropathy, chronic b/l LE ulcers presents with complaints of uncontrollable movements of the upper and lower extremities. Symptoms have been ongoing for about 1 year but more frequent and worse for the past 4 days prior to presentation, last for 15-20m at a time.   Admitted to Medicine service for further workup.       #Choreiform Movement Disorder - Etiology Unclear   - CTH negative, unknown family history  - pt continues to have movements while sleeping, mostly at night  - symptoms ongoing for 1 year   - Neurology consulted:   ddx: sporadic paroxysmal non-kinesogenic choreoathetosis vs celiac-related progressive chorea   - started Clonazepam 0.5mg TID   - f/u routine EEG   - check if PPM/AICD is MRI compatible - is so, recommend MRI Brain w/w/o chema, patient also has pins in his right hip  - f/u Celiac Abs, paraneoplastic markers, autoimmune markers (OBED, Sjogerns antibodies, lupus, etc.), SPEP/UPEP, Ceruloplasmin, copper level, lyme WB, ACE level, HgbA1C, CRMP5/CV2 abs, NMDA receptor abs, CASPR2 ab, LGI1 ab, B12, TSH, thyroid autoantibodies, PTH levels  - maintain gluten-free diet  - if all workup is negative, then consider LP       # CAD, s/p PCI/ CABG  # HLD  # CHFrEF (15-20%) - s/p ICD  - continue aspirin/statin/effient/metoprolol/spironolactone/lasix/digoxin     # LE Neuropathy   - continue Duloxetine     # DM  - monitor FS  - cont insulin pump    # right knee replacement  # right leg wound   - wound care consult  - morphine IR 30 mg prn       DVT ppx: lovenox   Gi ppx: not needed  Dispo: pending neuro work up , PT evaluation

## 2021-03-13 NOTE — PROGRESS NOTE ADULT - ASSESSMENT
65 year old male has medical history of  CAD s/p MI, PCI/CABG?, CHFrEF (15-20%), has ICD, HTN, Celiac disease, DM, neuropathy, chronic b/l LE ulcers presents with  paroxysmal choreiform movements of the upper and lower extremities of unclear etiology and duration.  May represent sporadic paroxysmal non-kinesogenic choreoathetosis vs celiac-related progressive chorea but will need to r/o secondary causes (e.g. toxic/metabolic/inflammatory/paraneoplastic).   Much improved today. REEG non epileptiform.    Plan:  - continue Clonazepam 0.5mg TID ATC  - check if PPM/AICD is MRI compatible - is so, recommend MRI Brain w/w/o chema  - check Celiac Abs, paraneoplastic markers, autoimmune markers (OBED, Sjogerns antibodies, lupus, etc.), SPEP/UPEP, ACE level, HgbA1C, CRMP5/CV2 abs, NMDA receptor abs, CASPR2 ab, LGI1 ab,   - maintain gluten-free diet  - if all workup is negative, may consider LP             65 year old male has medical history of  CAD s/p MI, PCI/CABG?, CHFrEF (15-20%), has ICD, HTN, Celiac disease, DM, neuropathy, chronic b/l LE ulcers presents with  paroxysmal choreiform movements of the upper and lower extremities of unclear etiology and duration.  May represent sporadic paroxysmal non-kinesogenic choreoathetosis vs celiac-related progressive chorea but will need to r/o secondary causes (e.g. toxic/metabolic/inflammatory/paraneoplastic).   Much improved today. REEG non epileptiform.    Plan:  - continue Clonazepam 0.5mg TID ATC, may decrease to 0.25 TID if still groggy after 24 hrs  - check if PPM/AICD is MRI compatible - if so, recommend MRI Brain w/w/o chema  - check Celiac Abs, paraneoplastic markers, autoimmune markers (OBED, Sjogerns antibodies, lupus, etc.), SPEP/UPEP, ACE level, HgbA1C, CRMP5/CV2 abs, NMDA receptor abs, CASPR2 ab, LGI1 ab,   - maintain gluten-free diet  - if all workup is negative, may consider LP

## 2021-03-13 NOTE — PROGRESS NOTE ADULT - SUBJECTIVE AND OBJECTIVE BOX
LOIDA FLOWER  65y  Male      Patient is a 65y old  Male who presents with a chief complaint of abnormal movements (13 Mar 2021 16:23)      INTERVAL HPI/OVERNIGHT EVENTS:  report of much improvement in leg/arm sponteneous shaking     ACE inhibitors (Hives)  carvedilol (Other)  enalapril (Hives)  Entresto (Other)        REVIEW OF SYSTEMS:  CONSTITUTIONAL: No fever, weight loss, or fatigue  RESPIRATORY: No cough, wheezing, chills or hemoptysis; No shortness of breath  CARDIOVASCULAR: No chest pain, palpitations, dizziness, or leg swelling  GASTROINTESTINAL: No abdominal or epigastric pain. No nausea, vomiting, or hematemesis; No diarrhea or constipation. No melena or hematochezia.  GENITOURINARY: No dysuria, frequency, hematuria, or incontinence  NEUROLOGICAL: No headaches, memory loss, loss of strength, numbness, or tremors  SKIN: No itching, burning, rashes, or lesions   LYMPH NODES: No enlarged glands  ENDOCRINE: No heat or cold intolerance; No hair loss  MUSCULOSKELETAL: right knee pain which limits ambulation which is his baseline, prolong hospital stay at [facility] for right knee infection   PSYCHIATRIC: No depression, anxiety, mood swings, or difficulty sleeping  HEME/LYMPH: No easy bruising, or bleeding gums  ALLERY AND IMMUNOLOGIC: No hives or eczema    FAMILY HISTORY:  Family history of allergies  daughter    Family history of heart disease (Mother)        T(C): 36.2 (03-13-21 @ 13:39), Max: 36.4 (03-13-21 @ 06:12)  HR: 61 (03-13-21 @ 13:39) (61 - 82)  BP: 104/59 (03-13-21 @ 13:39) (104/59 - 132/56)  RR: 18 (03-13-21 @ 13:39) (18 - 18)  SpO2: --    PHYSICAL EXAM:  GENERAL: NAD, well-groomed, well-developed, no sponteneous movements was observed throughout the encounter  HEAD:  Atraumatic, Normocephalic  EYES: EOMI, PERRLA, conjunctiva and sclera clear  ENMT: No tonsillar erythema, exudates, or enlargement; Moist mucous membranes, Good dentition, No lesions  NECK: Supple, No JVD, Normal thyroid  NERVOUS SYSTEM:  Alert & Oriented X3, Good concentration; Motor Strength 5/5 B/L upper and lower extremities; DTRs 2+ intact and symmetric  CHEST/LUNG: Clear to percussion bilaterally; No rales, rhonchi, wheezing, or rubs  HEART: Regular rate and rhythm; No murmurs, rubs, or gallops  ABDOMEN: Soft, Nontender, Nondistended; Bowel sounds present  EXTREMITIES:  2+ Peripheral Pulses, No clubbing, cyanosis, or edema, dressing over right knee c/d/i  LYMPH: No lymphadenopathy noted  SKIN: No rashes or lesions    Consultant(s) Notes Reviewed:  [x ] YES  [ ] NO  Care Discussed with Consultants/Other Providers [ x] YES  [ ] NO    LABS:                        11.0   8.94  )-----------( 330      ( 13 Mar 2021 08:11 )             37.1     03-13    138  |  101  |  23<H>  ----------------------------<  136<H>  4.6   |  26  |  1.0    Ca    9.5      13 Mar 2021 08:11  Phos  3.7     03-13  Mg     1.9     03-13    TPro  7.8  /  Alb  3.8  /  TBili  0.9  /  DBili  x   /  AST  32  /  ALT  32  /  AlkPhos  134<H>  03-13    LIVER FUNCTIONS - ( 13 Mar 2021 08:11 )  Alb: 3.8 g/dL / Pro: 7.8 g/dL / ALK PHOS: 134 U/L / ALT: 32 U/L / AST: 32 U/L / GGT: x                   RADIOLOGY & ADDITIONAL TESTS:    Imaging Personally Reviewed:  [x] YES  [ ] NO    HEALTH ISSUES - PROBLEM Dx:          MEDS:  aspirin enteric coated 81 milliGRAM(s) Oral daily  atorvastatin 80 milliGRAM(s) Oral at bedtime  chlorhexidine 4% Liquid 1 Application(s) Topical <User Schedule>  clonazePAM  Tablet 0.5 milliGRAM(s) Oral three times a day  digoxin     Tablet 125 MICROGram(s) Oral daily  DULoxetine 20 milliGRAM(s) Oral daily  enoxaparin Injectable 40 milliGRAM(s) SubCutaneous at bedtime  furosemide    Tablet 40 milliGRAM(s) Oral daily PRN  metoprolol succinate ER 25 milliGRAM(s) Oral daily  morphine  IR 30 milliGRAM(s) Oral daily PRN  prasugrel 10 milliGRAM(s) Oral daily  spironolactone 25 milliGRAM(s) Oral daily

## 2021-03-14 LAB
ANION GAP SERPL CALC-SCNC: 11 MMOL/L — SIGNIFICANT CHANGE UP (ref 7–14)
BUN SERPL-MCNC: 24 MG/DL — HIGH (ref 10–20)
CALCIUM SERPL-MCNC: 9.3 MG/DL — SIGNIFICANT CHANGE UP (ref 8.5–10.1)
CHLORIDE SERPL-SCNC: 101 MMOL/L — SIGNIFICANT CHANGE UP (ref 98–110)
CO2 SERPL-SCNC: 24 MMOL/L — SIGNIFICANT CHANGE UP (ref 17–32)
CREAT SERPL-MCNC: 1.1 MG/DL — SIGNIFICANT CHANGE UP (ref 0.7–1.5)
CREATININE, URINE RESULT: 61 MG/DL — SIGNIFICANT CHANGE UP
GLUCOSE SERPL-MCNC: 189 MG/DL — HIGH (ref 70–99)
HCT VFR BLD CALC: 36.2 % — LOW (ref 42–52)
HGB BLD-MCNC: 10.8 G/DL — LOW (ref 14–18)
MCHC RBC-ENTMCNC: 21.4 PG — LOW (ref 27–31)
MCHC RBC-ENTMCNC: 29.8 G/DL — LOW (ref 32–37)
MCV RBC AUTO: 71.7 FL — LOW (ref 80–94)
NRBC # BLD: 0 /100 WBCS — SIGNIFICANT CHANGE UP (ref 0–0)
PLATELET # BLD AUTO: 318 K/UL — SIGNIFICANT CHANGE UP (ref 130–400)
POTASSIUM SERPL-MCNC: 4.6 MMOL/L — SIGNIFICANT CHANGE UP (ref 3.5–5)
POTASSIUM SERPL-SCNC: 4.6 MMOL/L — SIGNIFICANT CHANGE UP (ref 3.5–5)
PROT ?TM UR-MCNC: 26 MG/DL — HIGH (ref 0–12)
PROT ?TM UR-MCNC: 26 MG/DL — HIGH (ref 0–12)
RBC # BLD: 5.05 M/UL — SIGNIFICANT CHANGE UP (ref 4.7–6.1)
RBC # FLD: 20.8 % — HIGH (ref 11.5–14.5)
SODIUM SERPL-SCNC: 136 MMOL/L — SIGNIFICANT CHANGE UP (ref 135–146)
WBC # BLD: 9.82 K/UL — SIGNIFICANT CHANGE UP (ref 4.8–10.8)
WBC # FLD AUTO: 9.82 K/UL — SIGNIFICANT CHANGE UP (ref 4.8–10.8)

## 2021-03-14 PROCEDURE — 99232 SBSQ HOSP IP/OBS MODERATE 35: CPT

## 2021-03-14 PROCEDURE — 99233 SBSQ HOSP IP/OBS HIGH 50: CPT

## 2021-03-14 PROCEDURE — 73562 X-RAY EXAM OF KNEE 3: CPT | Mod: 26,RT

## 2021-03-14 PROCEDURE — 73502 X-RAY EXAM HIP UNI 2-3 VIEWS: CPT | Mod: 26,RT

## 2021-03-14 RX ADMIN — Medication 0.5 MILLIGRAM(S): at 13:42

## 2021-03-14 RX ADMIN — PRASUGREL 10 MILLIGRAM(S): 5 TABLET, FILM COATED ORAL at 11:40

## 2021-03-14 RX ADMIN — Medication 25 MILLIGRAM(S): at 06:59

## 2021-03-14 RX ADMIN — DULOXETINE HYDROCHLORIDE 20 MILLIGRAM(S): 30 CAPSULE, DELAYED RELEASE ORAL at 11:40

## 2021-03-14 RX ADMIN — SPIRONOLACTONE 25 MILLIGRAM(S): 25 TABLET, FILM COATED ORAL at 06:59

## 2021-03-14 RX ADMIN — Medication 0.5 MILLIGRAM(S): at 21:26

## 2021-03-14 RX ADMIN — Medication 125 MICROGRAM(S): at 06:59

## 2021-03-14 RX ADMIN — ENOXAPARIN SODIUM 40 MILLIGRAM(S): 100 INJECTION SUBCUTANEOUS at 21:27

## 2021-03-14 RX ADMIN — ATORVASTATIN CALCIUM 80 MILLIGRAM(S): 80 TABLET, FILM COATED ORAL at 21:26

## 2021-03-14 RX ADMIN — Medication 81 MILLIGRAM(S): at 11:40

## 2021-03-14 RX ADMIN — Medication 0.5 MILLIGRAM(S): at 07:00

## 2021-03-14 RX ADMIN — CHLORHEXIDINE GLUCONATE 1 APPLICATION(S): 213 SOLUTION TOPICAL at 06:58

## 2021-03-14 NOTE — PROGRESS NOTE ADULT - SUBJECTIVE AND OBJECTIVE BOX
Neurology Follow up note  Patient seen and examined at bedside patient continues to have paroxysmal involuntary movements but reports significant decrease in the frequency of these episodes.         HPI:  65 year old male has medical history of  CAD s/p MI, PCI/CABG?, CHFrEF (15-20%), has ICD, HTN, Celiac disease, DM, neuropathy, chronic b/l LE ulcers presents with complaints of uncontrollable movements of the upper and lower extremities. Patient has had these movements for over a year now, it randomly comes and resolves on its own. It lasts about 15-20 min, where his movements are purposeless. No known exacerbating or relieving factors. Off note, patient reports of losing about 30 lbs in last 6-8 months. Patient was adopted, unknown family history. Patient otherwise denies Nausea, vomiting, fever, chills, headaches, SOB, chest pain, abdominal pain, muscle weakness, lower extremity swelling, urinary or bowel habit changes. In ED CTH negative for acute bleed. On physical exam, random upper extremity movements notes, little bit lower movement. Muscle wasting present in both hands, and both legs. and chronic BLE skin ulcerations. Patient is admitted to medicine team for further neurological work up.    ICD device [employer]  Model # [id number]  Serial # [id number]  implanted on  February 11, 2020  phone # [phone]            Vital Signs Last 24 Hrs  T(C): 37 (13 Mar 2021 21:26), Max: 37 (13 Mar 2021 21:26)  T(F): 98.6 (13 Mar 2021 21:26), Max: 98.6 (13 Mar 2021 21:26)  HR: 69 (13 Mar 2021 21:26) (61 - 79)  BP: 102/54 (13 Mar 2021 21:26) (102/54 - 129/64)  BP(mean): --  RR: 18 (13 Mar 2021 21:26) (18 - 18)  SpO2: --          Neurological Exam:   Mental status: Awake, alert and oriented x3.  Recent and remote memory intact.  Naming, repetition and comprehension intact.  Attention/concentration intact.  No dysarthria, no aphasia.  Fund of knowledge appropriate.    Cranial nerves: Pupils equally round and reactive to light, visual fields full, no nystagmus, extraocular muscles intact, V1 through V3 intact bilaterally and symmetric, face symmetric, hearing intact to finger rub, palate elevation symmetric, tongue was midline.  Motor:  5/5 throughout/ no abnormal movements noted currently   Sensation: Intact and symmetric  Coordination: No dysmetria or limb ataxia  Reflexes: 1+ in bilateral UE/LE, downgoing toes bilaterally. (-) Montemayor.  Gait: deferred        Medications  aspirin enteric coated 81 milliGRAM(s) Oral daily  atorvastatin 80 milliGRAM(s) Oral at bedtime  chlorhexidine 4% Liquid 1 Application(s) Topical <User Schedule>  clonazePAM  Tablet 0.5 milliGRAM(s) Oral three times a day  digoxin     Tablet 125 MICROGram(s) Oral daily  DULoxetine 20 milliGRAM(s) Oral daily  enoxaparin Injectable 40 milliGRAM(s) SubCutaneous at bedtime  furosemide    Tablet 40 milliGRAM(s) Oral daily PRN  metoprolol succinate ER 25 milliGRAM(s) Oral daily  morphine  IR 30 milliGRAM(s) Oral daily PRN  prasugrel 10 milliGRAM(s) Oral daily  spironolactone 25 milliGRAM(s) Oral daily          Lab                        11.0   8.94  )-----------( 330      ( 13 Mar 2021 08:11 )             37.1     03-13    138  |  101  |  23<H>  ----------------------------<  136<H>  4.6   |  26  |  1.0    Ca    9.5      13 Mar 2021 08:11  Phos  3.7     03-13  Mg     1.9     03-13    TPro  7.8  /  Alb  3.8  /  TBili  0.9  /  DBili  x   /  AST  32  /  ALT  32  /  AlkPhos  134<H>  03-13    A1C with Estimated Average Glucose (12.08.20 @ 12:13)   A1C with Estimated Average Glucose Result: 8.0: Method: Immunoassay       Thyroid Stimulating Hormone, Serum (03.12.21 @ 17:00)   Thyroid Stimulating Hormone, Serum: 1.68 uIU/mL T4, Serum (03.12.21 @ 17:00)   T4, Serum: 4.6 ug/dL C-Reactive Protein, Serum (03.12.21 @ 17:00)     C-Reactive Protein, Serum: 7: Please note: Reference range has changed due to units of measure change. mg/L     Vitamin B12, Serum (03.12.21 @ 17:00)   Vitamin B12, Serum: 412 pg/mL Folate, Serum (03.12.21 @ 17:00)     Folate, Serum: 12.9 ng/mL Ceruloplasmin, Serum (03.12.21 @ 17:00)     Ceruloplasmin, Serum: 31 mg/dL     `    EEG  < from: EEG (03.13.21 @ 11:00) >  State  Asleep    Symmetry  Symmetric  -    Organization  Well organized      Background: Not recorded awake state    Generalized Slowing  No  -    Focal Slowing  No  -  -  Breach Artifact: No  -      Activation Procedure  Hyper Ventilation  No  -  -    Photic Stimulation  No  -  -    Epileptiform Activity  No    Frequency:  -  -    Side:  -    Type:  -  -    Area of Activity:  -    Events:  No  -  -    Impression  Technically suboptimal, but asleep recording does not demonstrate abnormalities.  -  -    Clinical Correlation & Recommendations  -  -  Fast frequencies throughout the recording are likely iatrogenic. Clinical correlation recommended.  -    Reviewed by:  Priyanka Marks    Signed by:  Priyanka MARKS   This document has been electronically signed. Mar 13 2021  3:14PM    < end of copied text >       Neurology Follow up note  Patient seen and examined at bedside patient continues to have paroxysmal involuntary movements but reports significant decrease in the frequency of these episodes.     HPI:  65 year old male has medical history of  CAD s/p MI, PCI/CABG?, CHFrEF (15-20%), has ICD, HTN, Celiac disease, DM, neuropathy, chronic b/l LE ulcers presents with complaints of uncontrollable movements of the upper and lower extremities. Patient has had these movements for over a year now, it randomly comes and resolves on its own. It lasts about 15-20 min, where his movements are purposeless. No known exacerbating or relieving factors. Off note, patient reports of losing about 30 lbs in last 6-8 months. Patient was adopted, unknown family history. Patient otherwise denies Nausea, vomiting, fever, chills, headaches, SOB, chest pain, abdominal pain, muscle weakness, lower extremity swelling, urinary or bowel habit changes. In ED CTH negative for acute bleed. On physical exam, random upper extremity movements notes, little bit lower movement. Muscle wasting present in both hands, and both legs. and chronic BLE skin ulcerations. Patient is admitted to medicine team for further neurological work up.    ICD device [employer]  Model # [id number]  Serial # [id number]  implanted on  February 11, 2020  phone # [phone]    Vital Signs Last 24 Hrs  T(C): 37 (13 Mar 2021 21:26), Max: 37 (13 Mar 2021 21:26)  T(F): 98.6 (13 Mar 2021 21:26), Max: 98.6 (13 Mar 2021 21:26)  HR: 69 (13 Mar 2021 21:26) (61 - 79)  BP: 102/54 (13 Mar 2021 21:26) (102/54 - 129/64)  BP(mean): --  RR: 18 (13 Mar 2021 21:26) (18 - 18)  SpO2: --    Neurological Exam:   Mental status: Awake, alert and oriented x3.  Recent and remote memory intact.  Naming, repetition and comprehension intact.  Attention/concentration intact.  No dysarthria, no aphasia.  Fund of knowledge appropriate.    Cranial nerves: Pupils equally round and reactive to light, visual fields full, no nystagmus, extraocular muscles intact, V1 through V3 intact bilaterally and symmetric, face symmetric, hearing intact to finger rub, palate elevation symmetric, tongue was midline.  Motor:  5/5 throughout/ no abnormal movements noted currently   Sensation: Intact and symmetric  Coordination: No dysmetria or limb ataxia  Reflexes: 1+ in bilateral UE/LE, downgoing toes bilaterally. (-) Montemayor.  Gait: deferred    Medications  aspirin enteric coated 81 milliGRAM(s) Oral daily  atorvastatin 80 milliGRAM(s) Oral at bedtime  chlorhexidine 4% Liquid 1 Application(s) Topical <User Schedule>  clonazePAM  Tablet 0.5 milliGRAM(s) Oral three times a day  digoxin     Tablet 125 MICROGram(s) Oral daily  DULoxetine 20 milliGRAM(s) Oral daily  enoxaparin Injectable 40 milliGRAM(s) SubCutaneous at bedtime  furosemide    Tablet 40 milliGRAM(s) Oral daily PRN  metoprolol succinate ER 25 milliGRAM(s) Oral daily  morphine  IR 30 milliGRAM(s) Oral daily PRN  prasugrel 10 milliGRAM(s) Oral daily  spironolactone 25 milliGRAM(s) Oral daily          Lab                        11.0   8.94  )-----------( 330      ( 13 Mar 2021 08:11 )             37.1     03-13    138  |  101  |  23<H>  ----------------------------<  136<H>  4.6   |  26  |  1.0    Ca    9.5      13 Mar 2021 08:11  Phos  3.7     03-13  Mg     1.9     03-13    TPro  7.8  /  Alb  3.8  /  TBili  0.9  /  DBili  x   /  AST  32  /  ALT  32  /  AlkPhos  134<H>  03-13    A1C with Estimated Average Glucose (12.08.20 @ 12:13)   A1C with Estimated Average Glucose Result: 8.0: Method: Immunoassay       Thyroid Stimulating Hormone, Serum (03.12.21 @ 17:00)   Thyroid Stimulating Hormone, Serum: 1.68 uIU/mL T4, Serum (03.12.21 @ 17:00)   T4, Serum: 4.6 ug/dL C-Reactive Protein, Serum (03.12.21 @ 17:00)     C-Reactive Protein, Serum: 7: Please note: Reference range has changed due to units of measure change. mg/L     Vitamin B12, Serum (03.12.21 @ 17:00)   Vitamin B12, Serum: 412 pg/mL Folate, Serum (03.12.21 @ 17:00)     Folate, Serum: 12.9 ng/mL Ceruloplasmin, Serum (03.12.21 @ 17:00)     Ceruloplasmin, Serum: 31 mg/dL     `    EEG  < from: EEG (03.13.21 @ 11:00) >  State  Asleep    Symmetry  Symmetric  -    Organization  Well organized      Background: Not recorded awake state    Generalized Slowing  No  -    Focal Slowing  No  -  -  Breach Artifact: No  -      Activation Procedure  Hyper Ventilation  No  -  -    Photic Stimulation  No  -  -    Epileptiform Activity  No    Frequency:  -  -    Side:  -    Type:  -  -    Area of Activity:  -    Events:  No  -  -    Impression  Technically suboptimal, but asleep recording does not demonstrate abnormalities.  -  -    Clinical Correlation & Recommendations  -  -  Fast frequencies throughout the recording are likely iatrogenic. Clinical correlation recommended.  -    Reviewed by:  Priyanka Marks    Signed by:  Priyanka MARKS   This document has been electronically signed. Mar 13 2021  3:14PM    < end of copied text >

## 2021-03-14 NOTE — CONSULT NOTE ADULT - SUBJECTIVE AND OBJECTIVE BOX
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    INVOLUNTARY MOVEMENTS    ^INVOLUNTARY MOVEMENTS ...    Family history of allergies    Family history of heart disease (Mother)    Family history of heart disease (Mother)    Handoff    MEWS Score    Celiac disease    Diabetic neuropathy    STEMI (ST elevation myocardial infarction)    Diverticulitis    MRSA bacteremia    History of celiac disease    CHF (congestive heart failure)    HTN (hypertension)    Diabetes    Blood clot due to device, implant, or graft    CKD (chronic kidney disease) stage 2, GFR 60-89 ml/min    COPD, moderate    Hyperkalemia    HLD (hyperlipidemia)    Chronic systolic CHF (congestive heart failure)    Osteoarthritis    HTN (hypertension)    Type 2 diabetes mellitus with neurological manifestations    Type 2 diabetes mellitus    History of surgery to heart and great vessels, presenting hazards to health    Atherosclerosis of coronary artery    Status post percutaneous transluminal coronary angioplasty    CHF (congestive heart failure)    HTN (hypertension)    Involuntary movements    History of open reduction and internal fixation (ORIF) procedure    Surgery, elective    Surgery, elective    S/P TKR (total knee replacement), right    S/P CABG x 1    Stented coronary artery    History of surgery to major organs, presenting hazards to health    Other postprocedural status    INVOLUNTARY MOVEMENTS TO ARMS AND LEGS    34    [email or web address]        Medications  aspirin enteric coated 81 milliGRAM(s) Oral daily  atorvastatin 80 milliGRAM(s) Oral at bedtime  chlorhexidine 4% Liquid 1 Application(s) Topical <User Schedule>  clonazePAM  Tablet 0.5 milliGRAM(s) Oral three times a day  digoxin     Tablet 125 MICROGram(s) Oral daily  DULoxetine 20 milliGRAM(s) Oral daily  enoxaparin Injectable 40 milliGRAM(s) SubCutaneous at bedtime  furosemide    Tablet 40 milliGRAM(s) Oral daily PRN  metoprolol succinate ER 25 milliGRAM(s) Oral daily  morphine  IR 30 milliGRAM(s) Oral daily PRN  prasugrel 10 milliGRAM(s) Oral daily  spironolactone 25 milliGRAM(s) Oral daily      Allergies  ACE inhibitors (Hives)  carvedilol (Other)  enalapril (Hives)  Entresto (Other)        T(C): 35.6 (03-14-21 @ 14:19), Max: 37 (03-13-21 @ 21:26)  HR: 60 (03-14-21 @ 14:19) (60 - 69)  BP: 128/65 (03-14-21 @ 14:19) (102/54 - 128/65)  RR: 18 (03-14-21 @ 14:19) (18 - 18)  SpO2: 98% (03-14-21 @ 14:19) (98% - 98%)    Physical Exam  NAD  Breathing comfortably on RA  Resting comfortably    RLE  No deformity/laceration/abrasion noted  No swelling/erythema/ecchymosis noted  Motor: TA/EHL/Gastroc intact  Sensory: SP/DP/Dueñas/Sa intact  Vasc: foot WWP, 2+ DP pulse    LLE  No deformity/laceration/abrasion noted  No swelling/erythema/ecchymosis noted  Motor: TA/EHL/Gastroc intact  Sensory: SP/DP/Dueñas/Sa intact  Vasc: foot WWP, 2+ DP pulse    Labs                        10.8   9.82  )-----------( 318      ( 14 Mar 2021 07:37 )             36.2     03-14    136  |  101  |  24<H>  ----------------------------<  189<H>  4.6   |  24  |  1.1    Ca    9.3      14 Mar 2021 07:37  Phos  3.7     03-13  Mg     1.9     03-13    TPro  7.8  /  Alb  3.8  /  TBili  0.9  /  DBili  x   /  AST  32  /  ALT  32  /  AlkPhos  134<H>  03-13    LIVER FUNCTIONS - ( 13 Mar 2021 08:11 )  Alb: 3.8 g/dL / Pro: 7.8 g/dL / ALK PHOS: 134 U/L / ALT: 32 U/L / AST: 32 U/L / GGT: x               Img  R knee XR  static abx spacer with intramedullary metallic rods coated in cement in the femur and tibia  Lucency around the spacer, otherwise unchanged from prior XR    R hip XR  s/p r hip IMN, stable position, healed fracture    A/P: Patient is a 65y year old Male s/p R static abx spacer placement of right knee     The primary surgeon, Dr. Jose Castro of [facility] was contacted regarding the current plan and follow up for this patient  Recommend TDWB on RLE and WBAT on LLE  Further spacer care per primary team at [facility]  Return to clinic: Orthopaedic Adult Reconstructive with Dr. Jose Castro of , please call [phone] to schedule an appointment after discharge

## 2021-03-14 NOTE — PROGRESS NOTE ADULT - SUBJECTIVE AND OBJECTIVE BOX
FLOWER MARISCAL  65y  Male      Patient is a 65y old  Male who presents with a chief complaint of abnormal movements (13 Mar 2021 16:23)      INTERVAL HPI/OVERNIGHT EVENTS:  no event  patient still has shaking overnight        ACE inhibitors (Hives)  carvedilol (Other)  enalapril (Hives)  Entresto (Other)          REVIEW OF SYSTEMS:  CONSTITUTIONAL: No fever, weight loss, or fatigue  RESPIRATORY: No cough, wheezing, chills or hemoptysis; No shortness of breath  CARDIOVASCULAR: No chest pain, palpitations, dizziness, or leg swelling  GASTROINTESTINAL: No abdominal or epigastric pain. No nausea, vomiting, or hematemesis; No diarrhea or constipation. No melena or hematochezia.  GENITOURINARY: No dysuria, frequency, hematuria, or incontinence  NEUROLOGICAL: No headaches, memory loss, loss of strength, numbness, or tremors  SKIN: No itching, burning, rashes, or lesions   LYMPH NODES: No enlarged glands  ENDOCRINE: No heat or cold intolerance; No hair loss  MUSCULOSKELETAL: right knee unable to flex, which limits ambulation which is his baseline, prolong hospital stay at [facility] for right knee infection now with antibiotic  impregnated spacer  PSYCHIATRIC: No depression, anxiety, mood swings, or difficulty sleeping  HEME/LYMPH: No easy bruising, or bleeding gums  ALLERY AND IMMUNOLOGIC: No hives or eczema    FAMILY HISTORY:  Family history of allergies  daughter    Family history of heart disease (Mother)        T(C): 36 (03-14-21 @ 05:41), Max: 37 (03-13-21 @ 21:26)  HR: 61 (03-14-21 @ 05:41) (61 - 69)  BP: 118/61 (03-14-21 @ 05:41) (102/54 - 118/61)  RR: 18 (03-14-21 @ 05:41) (18 - 18)  SpO2: --    PHYSICAL EXAM:  GENERAL: NAD, well-groomed, well-developed  HEAD:  Atraumatic, Normocephalic  EYES: EOMI, PERRLA, conjunctiva and sclera clear  ENMT: No tonsillar erythema, exudates, or enlargement; Moist mucous membranes, Good dentition, No lesions  NECK: Supple, No JVD, Normal thyroid  NERVOUS SYSTEM:  Alert & Oriented X3, Good concentration; Motor Strength 5/5 B/L upper and lower extremities; DTRs 2+ intact and symmetric  CHEST/LUNG: Clear to percussion bilaterally; No rales, rhonchi, wheezing, or rubs  HEART: Regular rate and rhythm; No murmurs, rubs, or gallops  ABDOMEN: Soft, Nontender, Nondistended; Bowel sounds present  EXTREMITIES:  2+ Peripheral Pulses, No clubbing, cyanosis, or edema  LYMPH: No lymphadenopathy noted  SKIN: No rashes or lesions    Consultant(s) Notes Reviewed:  [x ] YES  [ ] NO  Care Discussed with Consultants/Other Providers [ x] YES  [ ] NO    LABS:                        10.8   9.82  )-----------( 318      ( 14 Mar 2021 07:37 )             36.2     03-14    136  |  101  |  24<H>  ----------------------------<  189<H>  4.6   |  24  |  1.1    Ca    9.3      14 Mar 2021 07:37  Phos  3.7     03-13  Mg     1.9     03-13    TPro  7.8  /  Alb  3.8  /  TBili  0.9  /  DBili  x   /  AST  32  /  ALT  32  /  AlkPhos  134<H>  03-13    LIVER FUNCTIONS - ( 13 Mar 2021 08:11 )  Alb: 3.8 g/dL / Pro: 7.8 g/dL / ALK PHOS: 134 U/L / ALT: 32 U/L / AST: 32 U/L / GGT: x                   RADIOLOGY & ADDITIONAL TESTS:    Imaging Personally Reviewed:  [x] YES  [ ] NO    HEALTH ISSUES - PROBLEM Dx:          MEDS:  aspirin enteric coated 81 milliGRAM(s) Oral daily  atorvastatin 80 milliGRAM(s) Oral at bedtime  chlorhexidine 4% Liquid 1 Application(s) Topical <User Schedule>  clonazePAM  Tablet 0.5 milliGRAM(s) Oral three times a day  digoxin     Tablet 125 MICROGram(s) Oral daily  DULoxetine 20 milliGRAM(s) Oral daily  enoxaparin Injectable 40 milliGRAM(s) SubCutaneous at bedtime  furosemide    Tablet 40 milliGRAM(s) Oral daily PRN  metoprolol succinate ER 25 milliGRAM(s) Oral daily  morphine  IR 30 milliGRAM(s) Oral daily PRN  prasugrel 10 milliGRAM(s) Oral daily  spironolactone 25 milliGRAM(s) Oral daily      Progress Note Handoff  Pending Consults:  Pending Tests:  Pending Results:  Family Discussion:  Disposition: Home_____/SNF______/Other_____/Unknown at this time_____   FLOWER MARISCAL  65y  Male      Patient is a 65y old  Male who presents with a chief complaint of abnormal movements (13 Mar 2021 16:23)      INTERVAL HPI/OVERNIGHT EVENTS:  no event  patient still has shaking overnight        ACE inhibitors (Hives)  carvedilol (Other)  enalapril (Hives)  Entresto (Other)          REVIEW OF SYSTEMS:  CONSTITUTIONAL: No fever, weight loss, or fatigue  RESPIRATORY: No cough, wheezing, chills or hemoptysis; No shortness of breath  CARDIOVASCULAR: No chest pain, palpitations, dizziness, or leg swelling  GASTROINTESTINAL: No abdominal or epigastric pain. No nausea, vomiting, or hematemesis; No diarrhea or constipation. No melena or hematochezia.  GENITOURINARY: No dysuria, frequency, hematuria, or incontinence  NEUROLOGICAL: No headaches, memory loss, loss of strength, numbness, or tremors  SKIN: No itching, burning, rashes, or lesions   LYMPH NODES: No enlarged glands  ENDOCRINE: No heat or cold intolerance; No hair loss  MUSCULOSKELETAL: right knee unable to flex, which limits ambulation which is his baseline, prolong hospital stay at [facility] for right knee infection now with antibiotic  impregnated spacer  PSYCHIATRIC: No depression, anxiety, mood swings, or difficulty sleeping  HEME/LYMPH: No easy bruising, or bleeding gums  ALLERY AND IMMUNOLOGIC: No hives or eczema    FAMILY HISTORY:  Family history of allergies  daughter    Family history of heart disease (Mother)        T(C): 36 (03-14-21 @ 05:41), Max: 37 (03-13-21 @ 21:26)  HR: 61 (03-14-21 @ 05:41) (61 - 69)  BP: 118/61 (03-14-21 @ 05:41) (102/54 - 118/61)  RR: 18 (03-14-21 @ 05:41) (18 - 18)  SpO2: --    PHYSICAL EXAM:  GENERAL: NAD, well-groomed, well-developed, no sponteneous movements was observed throughout the encounter  HEAD:  Atraumatic, Normocephalic  EYES: EOMI, PERRLA, conjunctiva and sclera clear  ENMT: No tonsillar erythema, exudates, or enlargement; Moist mucous membranes, Good dentition, No lesions  NECK: Supple, No JVD, Normal thyroid  NERVOUS SYSTEM:  Alert & Oriented X3, Good concentration; Motor Strength 5/5 B/L upper and lower extremities;  CHEST/LUNG: Clear to percussion bilaterally; No rales, rhonchi, wheezing, or rubs  HEART: Regular rate and rhythm; No murmurs, rubs, or gallops  ABDOMEN: Soft, Nontender, Nondistended; Bowel sounds present  EXTREMITIES:  2+ Peripheral Pulses, No clubbing, cyanosis, or edema, there is right knee deformity with superficial oblique wound no signs of infection  LYMPH: No lymphadenopathy noted  SKIN: No rashes or lesions    Consultant(s) Notes Reviewed:  [x ] YES  [ ] NO  Care Discussed with Consultants/Other Providers [ x] YES  [ ] NO    LABS:                        10.8   9.82  )-----------( 318      ( 14 Mar 2021 07:37 )             36.2     03-14    136  |  101  |  24<H>  ----------------------------<  189<H>  4.6   |  24  |  1.1    Ca    9.3      14 Mar 2021 07:37  Phos  3.7     03-13  Mg     1.9     03-13    TPro  7.8  /  Alb  3.8  /  TBili  0.9  /  DBili  x   /  AST  32  /  ALT  32  /  AlkPhos  134<H>  03-13    LIVER FUNCTIONS - ( 13 Mar 2021 08:11 )  Alb: 3.8 g/dL / Pro: 7.8 g/dL / ALK PHOS: 134 U/L / ALT: 32 U/L / AST: 32 U/L / GGT: x                   RADIOLOGY & ADDITIONAL TESTS:    Imaging Personally Reviewed:  [x] YES  [ ] NO    HEALTH ISSUES - PROBLEM Dx:          MEDS:  aspirin enteric coated 81 milliGRAM(s) Oral daily  atorvastatin 80 milliGRAM(s) Oral at bedtime  chlorhexidine 4% Liquid 1 Application(s) Topical <User Schedule>  clonazePAM  Tablet 0.5 milliGRAM(s) Oral three times a day  digoxin     Tablet 125 MICROGram(s) Oral daily  DULoxetine 20 milliGRAM(s) Oral daily  enoxaparin Injectable 40 milliGRAM(s) SubCutaneous at bedtime  furosemide    Tablet 40 milliGRAM(s) Oral daily PRN  metoprolol succinate ER 25 milliGRAM(s) Oral daily  morphine  IR 30 milliGRAM(s) Oral daily PRN  prasugrel 10 milliGRAM(s) Oral daily  spironolactone 25 milliGRAM(s) Oral daily

## 2021-03-14 NOTE — PROGRESS NOTE ADULT - ASSESSMENT
64 yo CAD s/p MI, PCI /CABG?, CHFrEF (15-20%), has ICD, HTN, Celiac disease, DM, neuropathy, chronic b/l LE ulcers presents with  paroxysmal choreiform movements of the upper and lower extremities of unclear etiology and duration.  May represent sporadic paroxysmal non-kinesogenic choreoathetosis vs celiac-related progressive chorea but will need to r/o secondary causes (e.g. toxic/metabolic/inflammatory/paraneoplastic).  REEG non epileptiform.  There is significant improvement in the frequency of these episodes.      Plan:  - Continue Clonazepam 0.5mg TID   -  MRI Brain w/w/o chema if PPM/AICD compatible  - Follow up blood work ( Celiac Abs, paraneoplastic markers, autoimmune markers (OBED, Sjogerns antibodies, lupus, etc.), SPEP/UPEP, ACE level, HgbA1C, CRMP5/CV2 abs, NMDA receptor abs, CASPR2 ab, LGI1 ab)   - maintain gluten-free diet  -  LP if rest of the work up is negative     64 yo CAD s/p MI, PCI /CABG?, CHFrEF (15-20%), has ICD, HTN, Celiac disease, DM, neuropathy, chronic b/l LE ulcers presents with  paroxysmal choreiform movements of the upper and lower extremities of unclear etiology and duration.  May represent sporadic paroxysmal non-kinesogenic choreoathetosis vs celiac-related progressive chorea but will need to r/o secondary causes (e.g. toxic/metabolic/inflammatory/paraneoplastic).  REEG non epileptiform.  There is significant improvement in the frequency of these episodes.      Plan:  - Continue Clonazepam 0.5mg TID   -  MRI Brain w/w/o chema if PPM/AICD compatible  - Follow up blood work ( Celiac Abs, paraneoplastic markers, autoimmune markers (OBED, Sjogerns antibodies, lupus, etc.), SPEP/UPEP, ACE level, HgbA1C, CRMP5/CV2 abs, NMDA receptor abs, CASPR2 ab, LGI1 ab)   - maintain gluten-free diet  - LP - check CSF for cell ct, glc, protein, paraneoplastic markers, ACE level, celiac autoabs, NMDA receptor Ab, voltage gated K-channel abs, cytology, flow cytometry, viral encephalitis panel

## 2021-03-14 NOTE — PROGRESS NOTE ADULT - ASSESSMENT
65 year old male has medical history of  CAD s/p MI, PCI/CABG?, CHFrEF (15-20%), has ICD, HTN, Celiac disease, DM, neuropathy, chronic b/l LE ulcers presents with complaints of uncontrollable movements of the upper and lower extremities. Symptoms have been ongoing for about 1 year but more frequent and worse for the past 4 days prior to presentation, last for 15-20m at a time.   Admitted to Medicine service for further workup.       #Choreiform Movement Disorder - Etiology Unclear   - CTH negative, unknown family history  - pt continues to have movements while sleeping, mostly at night  - symptoms ongoing for 1 year   - Neurology consulted:   ddx: sporadic paroxysmal non-kinesogenic choreoathetosis vs celiac-related progressive chorea   - started Clonazepam 0.5mg TID   - f/u routine EEG   - called [facility] to see AICD device was MRI compatible, recommended to call during office hours (Mon- Fri 6am to 4:30pm), patient also has Pins in right hip and knee spacer in right knee, unclear these orthopedic devices are MRI compatible, discussed with our MRI department, recommend to reach out to primary orthopedic surgeon for more informations ([facility])   - f/u Celiac Abs, paraneoplastic markers, autoimmune markers (OBED, Sjogerns antibodies, lupus, etc.), SPEP/UPEP, Ceruloplasmin, copper level, lyme WB, ACE level, HgbA1C, CRMP5/CV2 abs, NMDA receptor abs, CASPR2 ab, LGI1 ab, B12, TSH, thyroid autoantibodies, PTH levels  - maintain gluten-free diet and possible LP if above work up neg    # CAD, s/p PCI/ CABG  # HLD  # CHFrEF (15-20%) - s/p ICD  - continue aspirin/statin/effient/metoprolol/spironolactone/lasix/digoxin     # LE Neuropathy   - continue Duloxetine     # DM  - monitor FS  - cont insulin pump    # right knee replacement s/p revision s/p infection now with antibiotic impragnated knee spacer  # right leg wound   - wound care consult  - ortho consult regarding weight bearing status, PT unable to see him until ortho evaluation   - morphine IR 30 mg prn       DVT ppx: lovenox   Gi ppx: not needed  Dispo: pending neuro work up  65 year old male has medical history of  CAD s/p MI, PCI/CABG?, CHFrEF (15-20%), has ICD, HTN, Celiac disease, DM, neuropathy, chronic b/l LE ulcers presents with complaints of uncontrollable movements of the upper and lower extremities. Symptoms have been ongoing for about 1 year but more frequent and worse for the past 4 days prior to presentation, last for 15-20m at a time.   Admitted to Medicine service for further workup.       #Choreiform Movement Disorder - Etiology Unclear   - CTH negative, unknown family history  - pt continues to have movements while sleeping, mostly at night  - symptoms ongoing for 1 year   - Neurology consulted:   ddx: sporadic paroxysmal non-kinesogenic choreoathetosis vs celiac-related progressive chorea   - started Clonazepam 0.5mg TID   - f/u routine EEG   - called [facility] to see AICD device was MRI compatible, recommended to call during office hours (Mon- Fri 6am to 4:30pm), patient also has Pins in right hip and knee spacer in right knee, unclear these orthopedic devices are MRI compatible, discussed with our MRI department, recommend to reach out to primary orthopedic surgeon for more informations, ortho was consulted   - f/u Celiac Abs, paraneoplastic markers, autoimmune markers (OBED, Sjogerns antibodies, lupus, etc.), SPEP/UPEP, Ceruloplasmin, copper level, lyme WB, ACE level, HgbA1C, CRMP5/CV2 abs, NMDA receptor abs, CASPR2 ab, LGI1 ab, B12, TSH, thyroid autoantibodies, PTH levels  - maintain gluten-free diet and possible LP if above work up neg    # CAD, s/p PCI/ CABG  # HLD  # CHFrEF (15-20%) - s/p ICD  - continue aspirin/statin/effient/metoprolol/spironolactone/lasix/digoxin     # LE Neuropathy   - continue Duloxetine     # DM  - monitor FS  - cont insulin pump    # right knee replacement s/p revision s/p infection now with antibiotic impragnated knee spacer  # right leg wound   - wound care consult  - ortho consult regarding weight bearing status, PT unable to see him until ortho evaluation   - morphine IR 30 mg prn       DVT ppx: lovenox   Gi ppx: not needed  Dispo: pending neuro work up

## 2021-03-14 NOTE — CONSULT NOTE ADULT - ATTENDING COMMENTS
agree with above  will fu with dr gonzalez when discharged
I have personally seen and examined this patient.  I have fully participated in the care of this patient.  I have reviewed all pertinent clinical information, including history, physical exam, plan and note.   I have reviewed all pertinent clinical information and reviewed all relevant imaging and diagnostic studies personally.  Recommendations as above.  Agree with above assessment except as noted.

## 2021-03-14 NOTE — PROGRESS NOTE ADULT - ATTENDING COMMENTS
I have personally seen and examined this patient.  I have fully participated in the care of this patient.  I have reviewed all pertinent clinical information, including history, physical exam, plan and note.   I have reviewed all pertinent clinical information and reviewed all relevant imaging and diagnostic studies personally.  Recommendations as above.  Agree with above assessment except as noted.
I have personally seen and examined this patient.  I have fully participated in the care of this patient.  I have reviewed all pertinent clinical information, including history, physical exam, plan and note.   I have reviewed all pertinent clinical information and reviewed all relevant imaging and diagnostic studies personally.  Recommendations as above.  Agree with above assessment except as noted.

## 2021-03-15 LAB
ACE SERPL-CCNC: 57 U/L — SIGNIFICANT CHANGE UP (ref 14–82)
ANION GAP SERPL CALC-SCNC: 13 MMOL/L — SIGNIFICANT CHANGE UP (ref 7–14)
BUN SERPL-MCNC: 37 MG/DL — HIGH (ref 10–20)
CALCIUM SERPL-MCNC: 9.1 MG/DL — SIGNIFICANT CHANGE UP (ref 8.5–10.1)
CHLORIDE SERPL-SCNC: 98 MMOL/L — SIGNIFICANT CHANGE UP (ref 98–110)
CO2 SERPL-SCNC: 24 MMOL/L — SIGNIFICANT CHANGE UP (ref 17–32)
CREAT SERPL-MCNC: 1 MG/DL — SIGNIFICANT CHANGE UP (ref 0.7–1.5)
GLUCOSE SERPL-MCNC: 197 MG/DL — HIGH (ref 70–99)
HCT VFR BLD CALC: 38.2 % — LOW (ref 42–52)
HGB BLD-MCNC: 11.2 G/DL — LOW (ref 14–18)
IGA FLD-MCNC: 379 MG/DL — SIGNIFICANT CHANGE UP (ref 84–499)
IGG FLD-MCNC: 2125 MG/DL — HIGH (ref 610–1660)
IGM SERPL-MCNC: 56 MG/DL — SIGNIFICANT CHANGE UP (ref 35–242)
INTERPRETATION SERPL IFE-IMP: SIGNIFICANT CHANGE UP
KAPPA LC SER QL IFE: 11.08 MG/DL — HIGH (ref 0.33–1.94)
KAPPA LC SER QL IFE: 11.08 MG/DL — HIGH (ref 0.33–1.94)
KAPPA/LAMBDA FREE LIGHT CHAIN RATIO, SERUM: 2.52 RATIO — HIGH (ref 0.26–1.65)
KAPPA/LAMBDA FREE LIGHT CHAIN RATIO, SERUM: 2.52 RATIO — HIGH (ref 0.26–1.65)
LAMBDA LC SER QL IFE: 4.4 MG/DL — HIGH (ref 0.57–2.63)
LAMBDA LC SER QL IFE: 4.4 MG/DL — HIGH (ref 0.57–2.63)
MCHC RBC-ENTMCNC: 21.3 PG — LOW (ref 27–31)
MCHC RBC-ENTMCNC: 29.3 G/DL — LOW (ref 32–37)
MCV RBC AUTO: 72.6 FL — LOW (ref 80–94)
N-METHYL-DA RECEPTOR AB IGG BY CBA-IFA, SERUM W/RFLX TITER: SIGNIFICANT CHANGE UP
NRBC # BLD: 0 /100 WBCS — SIGNIFICANT CHANGE UP (ref 0–0)
PLATELET # BLD AUTO: 300 K/UL — SIGNIFICANT CHANGE UP (ref 130–400)
POTASSIUM SERPL-MCNC: 4.9 MMOL/L — SIGNIFICANT CHANGE UP (ref 3.5–5)
POTASSIUM SERPL-SCNC: 4.9 MMOL/L — SIGNIFICANT CHANGE UP (ref 3.5–5)
RBC # BLD: 5.26 M/UL — SIGNIFICANT CHANGE UP (ref 4.7–6.1)
RBC # FLD: 20.4 % — HIGH (ref 11.5–14.5)
SODIUM SERPL-SCNC: 135 MMOL/L — SIGNIFICANT CHANGE UP (ref 135–146)
THYROPEROXIDASE AB SERPL-ACNC: 43.9 IU/ML — HIGH
TTG IGA SER-ACNC: >100 U/ML — HIGH
TTG IGA SER-ACNC: POSITIVE
TTG IGG SER IA-ACNC: POSITIVE
TTG IGG SER-ACNC: 33.3 U/ML — HIGH
WBC # BLD: 10.01 K/UL — SIGNIFICANT CHANGE UP (ref 4.8–10.8)
WBC # FLD AUTO: 10.01 K/UL — SIGNIFICANT CHANGE UP (ref 4.8–10.8)

## 2021-03-15 PROCEDURE — 95816 EEG AWAKE AND DROWSY: CPT | Mod: 26

## 2021-03-15 PROCEDURE — 99232 SBSQ HOSP IP/OBS MODERATE 35: CPT

## 2021-03-15 PROCEDURE — 70553 MRI BRAIN STEM W/O & W/DYE: CPT | Mod: 26

## 2021-03-15 RX ORDER — LANOLIN ALCOHOL/MO/W.PET/CERES
10 CREAM (GRAM) TOPICAL AT BEDTIME
Refills: 0 | Status: DISCONTINUED | OUTPATIENT
Start: 2021-03-15 | End: 2021-03-16

## 2021-03-15 RX ADMIN — Medication 25 MILLIGRAM(S): at 06:53

## 2021-03-15 RX ADMIN — Medication 0.5 MILLIGRAM(S): at 13:24

## 2021-03-15 RX ADMIN — ENOXAPARIN SODIUM 40 MILLIGRAM(S): 100 INJECTION SUBCUTANEOUS at 21:14

## 2021-03-15 RX ADMIN — SPIRONOLACTONE 25 MILLIGRAM(S): 25 TABLET, FILM COATED ORAL at 06:53

## 2021-03-15 RX ADMIN — Medication 10 MILLIGRAM(S): at 21:51

## 2021-03-15 RX ADMIN — PRASUGREL 10 MILLIGRAM(S): 5 TABLET, FILM COATED ORAL at 12:43

## 2021-03-15 RX ADMIN — ATORVASTATIN CALCIUM 80 MILLIGRAM(S): 80 TABLET, FILM COATED ORAL at 21:14

## 2021-03-15 RX ADMIN — Medication 81 MILLIGRAM(S): at 12:43

## 2021-03-15 RX ADMIN — Medication 0.5 MILLIGRAM(S): at 21:14

## 2021-03-15 RX ADMIN — DULOXETINE HYDROCHLORIDE 20 MILLIGRAM(S): 30 CAPSULE, DELAYED RELEASE ORAL at 12:43

## 2021-03-15 RX ADMIN — Medication 125 MICROGRAM(S): at 06:53

## 2021-03-15 RX ADMIN — Medication 0.5 MILLIGRAM(S): at 06:53

## 2021-03-15 RX ADMIN — CHLORHEXIDINE GLUCONATE 1 APPLICATION(S): 213 SOLUTION TOPICAL at 06:46

## 2021-03-15 NOTE — ADVANCED PRACTICE NURSE CONSULT - RECOMMEDATIONS
1. R knee, R/L shin ulcerations - Cleanse wound bed w/ normal saline, gently pat dry.  Apply Cavilon no-sting barrier film to wound edges and periwound to prevent maceration.  Apply Collagenase Santyl nickel-thick to entire wound beds to maintain clean wound beds and enzymatically debride devitalized tissue. Cover w/ non-adherent Adaptics dressing then dry gauze dressing, secure w/ type or tegaderm. Apply Collagenase and change dressing daily and prn for strike-through drainage or soiling.  -Consider plastic consult for further management & treatment with advanced wound therapy (Promogran) if patient remains in-patient.   -Assess skin/wound qshift, report changes to primary provider.   -Upon d/c patient to continue outpatient follow-up with Plastics MD who performed initial surgery for further management/treatment.      Plan of Care: Primary RN Kaleigh made aware of above recs. Spoke w/ covering/primary PACHE[facility] Neff (at 1246) in regards to above. Signing off on patient, no further needs/recs from [facility] service at this time. Staff RN to perform routine skin/wound assessment and manage wound care. Questions or concerns or if wound worsens and reconsult needed, please contact [facility], [phone].    -

## 2021-03-15 NOTE — CONSULT NOTE ADULT - SUBJECTIVE AND OBJECTIVE BOX
INTERVENTIONAL RADIOLOGY CONSULT:     Procedure Requested: Subcutaneous collection drainage.    HPI:  65 year old male has medical history of  CAD s/p MI, PCI/CABG?, CHFrEF (15-20%), has ICD, HTN, Celiac disease, DM, neuropathy, chronic b/l LE ulcers presents with complaints of uncontrollable movements of the upper and lower extremities. Patient has had these movements for over a year now, it randomly comes and resolves on its own. It lasts about 15-20 min, where his movements are purposeless. No known exacerbating or relieving factors. Off note, patient reports of losing about 30 lbs in last 6-8 months. Patient was adopted, unknown family history. Patient otherwise denies Nausea, vomiting, fever, chills, headaches, SOB, chest pain, abdominal pain, muscle weakness, lower extremity swelling, urinary or bowel habit changes. In ED CTH negative for acute bleed. On physical exam, random upper extremity movements notes, little bit lower movement. Muscle wasting present in both hands, and both legs. and chronic BLE skin ulcerations. Patient is admitted to medicine team for further neurological work up.    [employer] device [employer]  Model # [id number]  Serial # [id number]  implanted on  February 11, 2020  phone # [phone]     (12 Mar 2021 11:18)      PAST MEDICAL & SURGICAL HISTORY:  Celiac disease    Diabetic neuropathy    STEMI (ST elevation myocardial infarction)    Diverticulitis    History of celiac disease    CHF (congestive heart failure)  EF ~ 25%    HTN (hypertension)    Diabetes  on insulin pump    Blood clot due to device, implant, or graft  was on blood thinners    HLD (hyperlipidemia)    Osteoarthritis    Atherosclerosis of coronary artery  CAD (coronary artery disease)    Status post percutaneous transluminal coronary angioplasty  in 2012    History of open reduction and internal fixation (ORIF) procedure    Surgery, elective  Right shoulder    Surgery, elective  right knee wound debridement    S/P TKR (total knee replacement), right  with infection Mrsa   per pt he was cleared from MRSA infection    S/P CABG x 1  2018    Stented coronary artery  10/18 heart attack  INFERIOR WALL MI    Other postprocedural status  Fixation hardware in foot LEFT        MEDICATIONS  (STANDING):  aspirin enteric coated 81 milliGRAM(s) Oral daily  atorvastatin 80 milliGRAM(s) Oral at bedtime  chlorhexidine 4% Liquid 1 Application(s) Topical <User Schedule>  clonazePAM  Tablet 0.5 milliGRAM(s) Oral three times a day  digoxin     Tablet 125 MICROGram(s) Oral daily  DULoxetine 20 milliGRAM(s) Oral daily  enoxaparin Injectable 40 milliGRAM(s) SubCutaneous at bedtime  metoprolol succinate ER 25 milliGRAM(s) Oral daily  prasugrel 10 milliGRAM(s) Oral daily  spironolactone 25 milliGRAM(s) Oral daily    MEDICATIONS  (PRN):  furosemide    Tablet 40 milliGRAM(s) Oral daily PRN leg swelling  morphine  IR 30 milliGRAM(s) Oral daily PRN pain      Allergies    ACE inhibitors (Hives)  carvedilol (Other)  enalapril (Hives)  Entresto (Other)    FAMILY HISTORY:  Family history of allergies  daughter    Family history of heart disease (Mother)        Physical Exam:   Vital Signs Last 24 Hrs  T(C): 36 (15 Mar 2021 13:48), Max: 36.6 (14 Mar 2021 21:29)  T(F): 96.8 (15 Mar 2021 13:48), Max: 97.9 (14 Mar 2021 21:29)  HR: 60 (15 Mar 2021 13:48) (60 - 66)  BP: 97/50 (15 Mar 2021 13:48) (97/50 - 123/60)  BP(mean): --  RR: 18 (15 Mar 2021 13:48) (18 - 21)  SpO2: 95% (14 Mar 2021 21:29) (95% - 95%)    Labs:                         11.2   10.01 )-----------( 300      ( 15 Mar 2021 08:11 )             38.2     03-15    135  |  98  |  37<H>  ----------------------------<  197<H>  4.9   |  24  |  1.0    Ca    9.1      15 Mar 2021 08:11          Pertinent labs:                      11.2   10.01 )-----------( 300      ( 15 Mar 2021 08:11 )             38.2       03-15    135  |  98  |  37<H>  ----------------------------<  197<H>  4.9   |  24  |  1.0    Ca    9.1      15 Mar 2021 08:11            Radiology & Additional Studies:     < from: CT Abdomen and Pelvis w/ IV Cont (02.05.21 @ 16:09) >  IMPRESSION: 3 cm rim-enhancing subcutaneous collection in the right upper quadrant likely connected to a contracted gallbladder at the site of a prior percutaneous cholecystostomy.    < end of copied text >      Radiology imaging reviewed.       ASSESSMENT/ PLAN:   65 year old male has medical history of  CAD s/p MI, PCI/CABG?, CHFrEF (15-20%), has ICD, HTN, Celiac disease, DM, neuropathy, chronic b/l LE ulcers presents with complaints of uncontrollable movements of the upper and lower extremities. IR consulted for previously described RLQ subcutaneous collection seen on CT Abd/Pel 2/5/21.  - Please obtain CT Abd/Pel with IV contrast.  - No emergent intervention at this time.  - IR will follow.    Thank you for the courtesy of this consult, please call [phone] with any further questions.    INTERVENTIONAL RADIOLOGY CONSULT:     Procedure Requested: Subcutaneous collection drainage.    HPI:  65 year old male has medical history of  CAD s/p MI, PCI/CABG?, CHFrEF (15-20%), has ICD, HTN, Celiac disease, DM, neuropathy, chronic b/l LE ulcers presents with complaints of uncontrollable movements of the upper and lower extremities. Patient has had these movements for over a year now, it randomly comes and resolves on its own. It lasts about 15-20 min, where his movements are purposeless. No known exacerbating or relieving factors. Off note, patient reports of losing about 30 lbs in last 6-8 months. Patient was adopted, unknown family history. Patient otherwise denies Nausea, vomiting, fever, chills, headaches, SOB, chest pain, abdominal pain, muscle weakness, lower extremity swelling, urinary or bowel habit changes. In ED CTH negative for acute bleed. On physical exam, random upper extremity movements notes, little bit lower movement. Muscle wasting present in both hands, and both legs. and chronic BLE skin ulcerations. Patient is admitted to medicine team for further neurological work up.    [employer] device [employer]  Model # [id number]  Serial # [id number]  implanted on  February 11, 2020  phone # [phone]     (12 Mar 2021 11:18)      PAST MEDICAL & SURGICAL HISTORY:  Celiac disease    Diabetic neuropathy    STEMI (ST elevation myocardial infarction)    Diverticulitis    History of celiac disease    CHF (congestive heart failure)  EF ~ 25%    HTN (hypertension)    Diabetes  on insulin pump    Blood clot due to device, implant, or graft  was on blood thinners    HLD (hyperlipidemia)    Osteoarthritis    Atherosclerosis of coronary artery  CAD (coronary artery disease)    Status post percutaneous transluminal coronary angioplasty  in 2012    History of open reduction and internal fixation (ORIF) procedure    Surgery, elective  Right shoulder    Surgery, elective  right knee wound debridement    S/P TKR (total knee replacement), right  with infection Mrsa   per pt he was cleared from MRSA infection    S/P CABG x 1  2018    Stented coronary artery  10/18 heart attack  INFERIOR WALL MI    Other postprocedural status  Fixation hardware in foot LEFT        MEDICATIONS  (STANDING):  aspirin enteric coated 81 milliGRAM(s) Oral daily  atorvastatin 80 milliGRAM(s) Oral at bedtime  chlorhexidine 4% Liquid 1 Application(s) Topical <User Schedule>  clonazePAM  Tablet 0.5 milliGRAM(s) Oral three times a day  digoxin     Tablet 125 MICROGram(s) Oral daily  DULoxetine 20 milliGRAM(s) Oral daily  enoxaparin Injectable 40 milliGRAM(s) SubCutaneous at bedtime  metoprolol succinate ER 25 milliGRAM(s) Oral daily  prasugrel 10 milliGRAM(s) Oral daily  spironolactone 25 milliGRAM(s) Oral daily    MEDICATIONS  (PRN):  furosemide    Tablet 40 milliGRAM(s) Oral daily PRN leg swelling  morphine  IR 30 milliGRAM(s) Oral daily PRN pain      Allergies    ACE inhibitors (Hives)  carvedilol (Other)  enalapril (Hives)  Entresto (Other)    FAMILY HISTORY:  Family history of allergies  daughter    Family history of heart disease (Mother)        Physical Exam:   Vital Signs Last 24 Hrs  T(C): 36 (15 Mar 2021 13:48), Max: 36.6 (14 Mar 2021 21:29)  T(F): 96.8 (15 Mar 2021 13:48), Max: 97.9 (14 Mar 2021 21:29)  HR: 60 (15 Mar 2021 13:48) (60 - 66)  BP: 97/50 (15 Mar 2021 13:48) (97/50 - 123/60)  BP(mean): --  RR: 18 (15 Mar 2021 13:48) (18 - 21)  SpO2: 95% (14 Mar 2021 21:29) (95% - 95%)    Labs:                         11.2   10.01 )-----------( 300      ( 15 Mar 2021 08:11 )             38.2     03-15    135  |  98  |  37<H>  ----------------------------<  197<H>  4.9   |  24  |  1.0    Ca    9.1      15 Mar 2021 08:11            Radiology & Additional Studies:     < from: CT Abdomen and Pelvis w/ IV Cont (02.05.21 @ 16:09) >  IMPRESSION: 3 cm rim-enhancing subcutaneous collection in the right upper quadrant likely connected to a contracted gallbladder at the site of a prior percutaneous cholecystostomy.    < end of copied text >      Radiology imaging reviewed.       ASSESSMENT/ PLAN:   65 year old male has medical history of  CAD s/p MI, PCI/CABG?, CHFrEF (15-20%), has ICD, HTN, Celiac disease, DM, neuropathy, chronic b/l LE ulcers presents with complaints of uncontrollable movements of the upper and lower extremities. IR consulted for previously described RLQ subcutaneous collection seen on CT Abd/Pel 2/5/21.  - Please obtain CT Abd/Pel with IV contrast.  - No emergent intervention at this time.  - IR will follow.    Thank you for the courtesy of this consult, please call [phone] with any further questions.

## 2021-03-15 NOTE — CONSULT NOTE ADULT - CONSULT REASON
Subcutaneous collection drainage
Involuntary Bodily movements/Chorea
WB RECOMMENDATIONS S/P R TKA EXPLANT AND ABX SPACER AT [facility]

## 2021-03-15 NOTE — PROGRESS NOTE ADULT - ASSESSMENT
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  F/up neuro recommendations - ? 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  MRI of brain scheduled for tomorrow.    IR vs. Surgery to evaluate for drainage of RUQ subcutaneous swelling?    + DM with insulin pump.  F/up POC.    F/u Celiac Abs, paraneoplastic markers, autoimmune markers (OBED, Sjogerns antibodies, lupus, etc.), SPEP/UPEP, Ceruloplasmin, copper level, lyme WB, ACE level, HgbA1C, CRMP5/CV2 abs, NMDA receptor abs, CASPR2 ab, LGI1 ab, B12, TSH, thyroid autoantibodies, PTH levels  - maintain gluten-free diet and possible LP if above work up neg    Hx of  CAD, s/p PCI/ CABG, HLD, CHF with depressed EF s/p ICD, LE neuropathy - meds as ordered.    Hx of right knee replacement s/p revision s/p infection now with antibiotic impragnated knee spacer    Hx of chronic non-healing LE wound - f/up wound care recommendation.    Pt refusing PT - states PT does not help and he does not benefit from it.  He wants a wheelchair so he can go to the cafeteria - told that is against hosp policy.    Chronic severe malnutrition - nutrition consult.

## 2021-03-15 NOTE — PROGRESS NOTE ADULT - SUBJECTIVE AND OBJECTIVE BOX
Chart summary:  "65 year old male PMH- CAD s/p MI, PCI/CABG?, CHFrEF (15-20%) s/p  ICD, HTN, ?Celiac disease, type 2 DM, neuropathy, chronic b/l LE ulcers presents with complaints of uncontrollable movements of the upper and lower extremities. Patient has had these movements for over a year now, it randomly comes and resolves on its own. It lasts about 15-20 min, where his movements are purposeless.  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  However since then, there is a residual "seroma" RUQ and he is asking evaluation for that.  This is seen on current abd CT which showed enhanced collection RUQ.    + Loss of 70 lbs over the past year - unintentional.    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  Orthopedic f/up at Health system.    OVERNIGHT EVENTS:    PAST MEDICAL & SURGICAL HISTORY:  Celiac disease (pt claims he may not have it and he is not following diet)  Diabetic neuropathy  STEMI (ST elevation myocardial infarction)  Diverticulitis  CAD with CHF (congestive heart failure) with EF 25% s/p ICD  HTN (hypertension)  Diabetes on insulin pump w/ Diabetic neuropathy  Blood clot due to device, implant, or graft  was on blood thinners  HLD (hyperlipidemia)  Osteoarthritis  Status post percutaneous transluminal coronary angioplasty  in 2012  History of open reduction and internal fixation (ORIF) procedure    Surgery, elective  Right shoulder  right knee wound debridement  S/P TKR (total knee replacement), right  with infection Mrsa   per pt he was cleared from MRSA infection  S/P CABG x 1 (2018)  Stented coronary artery  10/18 heart attack  INFERIOR WALL MI    Other postprocedural status  Fixation hardware in foot LEFT        VITALS:  T(F): 96.8 (03-15-21 @ 13:48), Max: 97.9 (03-14-21 @ 21:29)  HR: 60 (03-15-21 @ 13:48)  BP: 97/50 (03-15-21 @ 13:48) (97/50 - 123/60)  RR: 18 (03-15-21 @ 13:48)  SpO2: 95% (03-14-21 @ 21:29)    PHYSICAL EXAM:    Cachetic with bitemp mm wasting.  sunken orbits.  Prominent clavicals and ribs.  Loss of subcutaneous fat both UE and LE with muscle wasting noted.    Eyes: anicteric.    Respiratory: CTA b/l.    Cardiovascular:  Reg S1S2    Gastrointestinal:  + Small soft subcutaneous swelling RUQ, NT. No redness.    Genitourinary:  No suprapubic tenderness / fullness.    Extremities: + Bony deformity R knee with restricted ROM.  No redness.  + B/l multiple superficial skin ulcers.      TESTS & MEASUREMENTS:      03-13-21 @ 06:01  -  03-14-21 @ 07:00  --------------------------------------------------------  IN: 0 mL / OUT: 150 mL / NET: -150 mL    03-14-21 @ 07:01  -  03-15-21 @ 07:00  --------------------------------------------------------  IN: 120 mL / OUT: 1300 mL / NET: -1180 mL                            11.2   10.01 )-----------( 300      ( 15 Mar 2021 08:11 )             38.2       03-15    135  |  98  |  37<H>  ----------------------------<  197<H>  4.9   |  24  |  1.0    Ca    9.1      15 Mar 2021 08:11    Culture - Urine (collected 03-12-21 @ 09:57)  Source: .Urine Clean Catch (Midstream)  Final Report (03-13-21 @ 13:00):    <10,000 CFU/mL Normal Urogenital Tricia    MEDICATIONS:  MEDICATIONS  (STANDING):  aspirin enteric coated 81 milliGRAM(s) Oral daily  atorvastatin 80 milliGRAM(s) Oral at bedtime  chlorhexidine 4% Liquid 1 Application(s) Topical <User Schedule>  clonazePAM  Tablet 0.5 milliGRAM(s) Oral three times a day  digoxin     Tablet 125 MICROGram(s) Oral daily  DULoxetine 20 milliGRAM(s) Oral daily  enoxaparin Injectable 40 milliGRAM(s) SubCutaneous at bedtime  metoprolol succinate ER 25 milliGRAM(s) Oral daily  prasugrel 10 milliGRAM(s) Oral daily  spironolactone 25 milliGRAM(s) Oral daily    MEDICATIONS  (PRN):  furosemide    Tablet 40 milliGRAM(s) Oral daily PRN leg swelling  morphine  IR 30 milliGRAM(s) Oral daily PRN pain

## 2021-03-15 NOTE — ADVANCED PRACTICE NURSE CONSULT - ASSESSMENT
65 year old male PMH- CAD s/p MI, PCI/CABG?, CHFrEF (15-20%), has ICD, HTN, Celiac disease, DM, neuropathy, chronic b/l LE ulcers presents with complaints of uncontrollable movements of the upper and lower extremities. Patient has had these movements for over a year now, it randomly comes and resolves on its own. 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     Received patient  on 4A, sitting up in chair at bedside, awake, A&Ox3, made aware of purpose of WOCN visit, agreeable to consult. Patient reports wounds present since 2019 when he had his initial surgery at [facility] for ortho & plastics reconstruction, reports wounds slowly healing. States "all kinds of wound treatment used", mostly recently used was Promogran matrix dressings, Collageanse Santyl covered with Adaptic & gauze dressing as directed by his plastic surgeon at [facility], patient reports last seeing plastic MD ~3months ago, states he is going to follow-up with MD again soon. Patient states he self-manages his wound care at home. Bandaid to right knee in place, gauze dressings secured w/ foam tape to B/L shins, all dressings removed for assessment.     Type of wound: Full thickness ulcerations; previous surgical wounds/graft sites     Location: right knee, right shin & left shin    Wound measurements: R knee- 4cm x 1cm x 0.3cm; R shin-3cm x 3.5cm x 0.3cm; L shin-6cm x 2cm x 0.3cm   Tunneling/Undermining: none  Wound bed: moist, marbleized w/ dark pink & yellow subcutaneous tissue; R shin w/ yellow slough tissue  Wound edges: attached, irregular   Periwound: all within healed graft sites, multiple scabbed over areas to right & left shin area   Wound exudate: moderate amount of sanginous drainage present on removed dressings    Wound odor: none  Induration, erythema, warmth: none   Wound pain: denies

## 2021-03-16 ENCOUNTER — TRANSCRIPTION ENCOUNTER (OUTPATIENT)
Age: 66
End: 2021-03-16

## 2021-03-16 VITALS
SYSTOLIC BLOOD PRESSURE: 111 MMHG | HEART RATE: 60 BPM | DIASTOLIC BLOOD PRESSURE: 57 MMHG | TEMPERATURE: 95 F | RESPIRATION RATE: 18 BRPM

## 2021-03-16 LAB
% GAMMA, URINE: 21.5 % — SIGNIFICANT CHANGE UP
ALBUMIN 24H MFR UR ELPH: 23 % — SIGNIFICANT CHANGE UP
ALPHA1 GLOB 24H MFR UR ELPH: 29.9 % — SIGNIFICANT CHANGE UP
ALPHA2 GLOB 24H MFR UR ELPH: 9 % — SIGNIFICANT CHANGE UP
ANION GAP SERPL CALC-SCNC: 8 MMOL/L — SIGNIFICANT CHANGE UP (ref 7–14)
B-GLOBULIN 24H MFR UR ELPH: 16.6 % — SIGNIFICANT CHANGE UP
BUN SERPL-MCNC: 41 MG/DL — HIGH (ref 10–20)
CALCIUM SERPL-MCNC: 8.8 MG/DL — SIGNIFICANT CHANGE UP (ref 8.5–10.1)
CHLORIDE SERPL-SCNC: 101 MMOL/L — SIGNIFICANT CHANGE UP (ref 98–110)
CO2 SERPL-SCNC: 25 MMOL/L — SIGNIFICANT CHANGE UP (ref 17–32)
CREAT SERPL-MCNC: 1.1 MG/DL — SIGNIFICANT CHANGE UP (ref 0.7–1.5)
GLUCOSE SERPL-MCNC: 187 MG/DL — HIGH (ref 70–99)
HCT VFR BLD CALC: 34 % — LOW (ref 42–52)
HGB BLD-MCNC: 10.1 G/DL — LOW (ref 14–18)
INTERPRETATION 24H UR IFE-IMP: SIGNIFICANT CHANGE UP
INTERPRETATION 24H UR IFE-IMP: SIGNIFICANT CHANGE UP
M PROTEIN 24H UR ELPH-MRATE: SIGNIFICANT CHANGE UP
MCHC RBC-ENTMCNC: 21.4 PG — LOW (ref 27–31)
MCHC RBC-ENTMCNC: 29.7 G/DL — LOW (ref 32–37)
MCV RBC AUTO: 72.2 FL — LOW (ref 80–94)
NRBC # BLD: 0 /100 WBCS — SIGNIFICANT CHANGE UP (ref 0–0)
PLATELET # BLD AUTO: 325 K/UL — SIGNIFICANT CHANGE UP (ref 130–400)
POTASSIUM SERPL-MCNC: 5 MMOL/L — SIGNIFICANT CHANGE UP (ref 3.5–5)
POTASSIUM SERPL-SCNC: 5 MMOL/L — SIGNIFICANT CHANGE UP (ref 3.5–5)
PROT ?TM UR-MCNC: 26 MG/DL — HIGH (ref 0–12)
PROT PATTERN 24H UR ELPH-IMP: SIGNIFICANT CHANGE UP
RBC # BLD: 4.71 M/UL — SIGNIFICANT CHANGE UP (ref 4.7–6.1)
RBC # FLD: 20.2 % — HIGH (ref 11.5–14.5)
SODIUM SERPL-SCNC: 134 MMOL/L — LOW (ref 135–146)
TOTAL VOLUME - 24 HOUR: SIGNIFICANT CHANGE UP ML
URINE CREATININE CALCULATION: SIGNIFICANT CHANGE UP G/24 H (ref 1–2)
URINE KAPPA TOTAL LIGHT CHAIN: 8.2 MG/DL — HIGH
URINE KAPPA/LAMBDA TLC RATIO: 3.78 — SIGNIFICANT CHANGE UP (ref 0.7–6.2)
URINE LAMBDA TOTAL LIGHT CHAIN: 2.17 MG/DL — HIGH
WBC # BLD: 11.49 K/UL — HIGH (ref 4.8–10.8)
WBC # FLD AUTO: 11.49 K/UL — HIGH (ref 4.8–10.8)

## 2021-03-16 PROCEDURE — 99238 HOSP IP/OBS DSCHRG MGMT 30/<: CPT

## 2021-03-16 PROCEDURE — 99231 SBSQ HOSP IP/OBS SF/LOW 25: CPT

## 2021-03-16 RX ORDER — ROSUVASTATIN CALCIUM 5 MG/1
1 TABLET ORAL
Qty: 30 | Refills: 0
Start: 2021-03-16 | End: 2021-04-14

## 2021-03-16 RX ORDER — ALPRAZOLAM 0.25 MG
1 TABLET ORAL
Qty: 0 | Refills: 0 | DISCHARGE
Start: 2021-03-16 | End: 2021-03-29

## 2021-03-16 RX ORDER — ROSUVASTATIN CALCIUM 5 MG/1
1 TABLET ORAL
Qty: 0 | Refills: 0 | DISCHARGE

## 2021-03-16 RX ORDER — FUROSEMIDE 40 MG
1 TABLET ORAL
Qty: 30 | Refills: 0
Start: 2021-03-16 | End: 2021-04-14

## 2021-03-16 RX ORDER — CLONAZEPAM 1 MG
0.25 TABLET ORAL EVERY 8 HOURS
Refills: 0 | Status: DISCONTINUED | OUTPATIENT
Start: 2021-03-16 | End: 2021-03-16

## 2021-03-16 RX ORDER — PANTOPRAZOLE SODIUM 20 MG/1
1 TABLET, DELAYED RELEASE ORAL
Qty: 30 | Refills: 0
Start: 2021-03-16 | End: 2021-04-14

## 2021-03-16 RX ORDER — DULOXETINE HYDROCHLORIDE 30 MG/1
1 CAPSULE, DELAYED RELEASE ORAL
Qty: 60 | Refills: 0
Start: 2021-03-16 | End: 2021-04-14

## 2021-03-16 RX ORDER — ALPRAZOLAM 0.25 MG
0.5 TABLET ORAL
Qty: 21 | Refills: 0
Start: 2021-03-16 | End: 2021-03-29

## 2021-03-16 RX ORDER — LANOLIN ALCOHOL/MO/W.PET/CERES
1 CREAM (GRAM) TOPICAL
Qty: 0 | Refills: 0 | DISCHARGE
Start: 2021-03-16

## 2021-03-16 RX ORDER — DULOXETINE HYDROCHLORIDE 30 MG/1
1 CAPSULE, DELAYED RELEASE ORAL
Qty: 0 | Refills: 0 | DISCHARGE

## 2021-03-16 RX ORDER — ONDANSETRON 8 MG/1
4 TABLET, FILM COATED ORAL ONCE
Refills: 0 | Status: COMPLETED | OUTPATIENT
Start: 2021-03-16 | End: 2021-03-16

## 2021-03-16 RX ADMIN — DULOXETINE HYDROCHLORIDE 20 MILLIGRAM(S): 30 CAPSULE, DELAYED RELEASE ORAL at 11:55

## 2021-03-16 RX ADMIN — PRASUGREL 10 MILLIGRAM(S): 5 TABLET, FILM COATED ORAL at 11:55

## 2021-03-16 RX ADMIN — ONDANSETRON 4 MILLIGRAM(S): 8 TABLET, FILM COATED ORAL at 03:39

## 2021-03-16 RX ADMIN — Medication 125 MICROGRAM(S): at 05:50

## 2021-03-16 RX ADMIN — Medication 0.25 MILLIGRAM(S): at 13:27

## 2021-03-16 RX ADMIN — Medication 0.5 MILLIGRAM(S): at 05:50

## 2021-03-16 RX ADMIN — Medication 81 MILLIGRAM(S): at 11:55

## 2021-03-16 RX ADMIN — Medication 25 MILLIGRAM(S): at 05:50

## 2021-03-16 RX ADMIN — SPIRONOLACTONE 25 MILLIGRAM(S): 25 TABLET, FILM COATED ORAL at 05:50

## 2021-03-16 NOTE — DISCHARGE NOTE NURSING/CASE MANAGEMENT/SOCIAL WORK - PATIENT PORTAL LINK FT
You can access the FollowMyHealth Patient Portal offered by [facility] by registering at the following website: [email or web address]. By joining [facility]’s FollowMyHealth portal, you will also be able to view your health information using other applications (apps) compatible with our system.

## 2021-03-16 NOTE — DISCHARGE NOTE PROVIDER - HOSPITAL COURSE
65 year old male has medical history of  CAD s/p MI, PCI/CABG?, CHFrEF (15-20%), has ICD, HTN, Celiac disease, DM2, neuropathy, chronic b/l LE ulcers, hx infected R TKR with MRSA (was eventually cemented so has limited ROM R knee),  presented on 3/12/21 with complaint of uncontrollable movements of the upper and lower extremities. Patient has had these movements for over a year, it randomly comes and resolves on its own. It lasts about 15-20 min, where his movements are purposeless. No known exacerbating or relieving factors. Of note, patient reported of losing about 30 lbs in last 6-8 months. Patient was adopted, unknown family history. Patient otherwise denied nausea, vomiting, fever, chills, headaches, SOB, chest pain, abdominal pain, muscle weakness, lower extremity swelling, urinary or bowel habit changes. In ED CT Head negative for acute bleed. On physical exam, random upper extremity movements noted, little bit lower movement. Muscle wasting present in both hands and both legs. and chronic BLE skin ulcerations.     Allergies:  ACE inhibitors (Hives)  carvedilol (Other)  enalapril (Hives)  Entresto (Other)    COVID NEGATIVE 3/12/21    The patient is being followed by Neurology, and he is followed by Cardiology Dr. Lopez in  and Dr. Lilia Caldera in [facility], also by Endo Dr. Cormier at [facility].  Multiple autoimmune, paraneoplastic and celiac markers were ordered; not all are resulted yet, but the celiac markers are all strongly +.  The patient also has a + finding on CT abd/pelvis with IV contrast done 2/5/21:  < from: CT Abdomen and Pelvis w/ IV Cont (02.05.21 @ 16:09) >    IMPRESSION: 3 cm rim-enhancing subcutaneous collection in the right upper quadrant likely connected to a contracted gallbladder at the site of a prior percutaneous cholecystostomy.    < end of copied text >    The patient insists he wants to go home today, 3/16/21.  He has an appointment with Dr. Lopez tomorrow, 3/17, in order to be cleared for the RUQ bx in IR.  Neurology strongly recommends that the patient has an LP in IR as well, however the patient is adamantly refusing at this time. EEG and MRI were negative.  Perhaps he can be convinced to have the LP when he sees Neuro Dr. Navas for follow up in one week.    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  May represent sporadic paroxysmal non-kinesogenic choreoathetosis vs celiac-related progressive chorea but will need to r/o secondary causes (e.g. toxic/metabolic/inflammatory/paraneoplastic).  REEG non epileptiform.  MRI brain showed microvascular changes and chronic lacunar infarct but they don't explain the movements.     Plan:  - Continue Clonazepam 0.5mg TID   - Positive for transglutaminase antibodies and anti TPO    - Follow up blood work  (paraneoplastic markers, autoimmune markers (OBED, Sjogerns antibodies, lupus, etc.), SPEP/UPEP, ACE level, HgbA1C, CRMP5/CV2 abs, NMDA receptor abs, CASPR2 ab, LGI1 ab)   - GI consult for celiac   ***- LP - check **CSF for cell ct, glc, protein, paraneoplastic markers, ACE level, celiac autoabs, NMDA receptor Ab, voltage gated K-channel abs, cytology, flow cytometry, viral encephalitis panel**  - Possible discharge after LP --- the patient is refusing at this time; he will discuss with Dr. Navas when he follows up in one week    Writer called and spoke to the patient's wife, Raine; she stated, and writer noticed this as well, that her  has been very sedated  since being started on klonopin 0.5mg po q8h, which has been controlling the choreiform movements. Dose was decreased to 0.25mg po  q8h for discharge. All Raine's questions and concerns were addressed, and we reviewed all her 's meds and Rx that he needs  were sent to his pharmacy.    He was getting only 20mg po cymbalta daily while here, however his home dose is 30mg po q12h, Raine read the directions on the bottle and is was   prescribed by Dr. Cormier, the patient's endocrinologist. The patient has an insulin pump with Humalog, and he also takes jardiance 10mg po q AM, which  he can resume at home.    The patient will also need to follow up with Ortho at [facility] re: his R TKR; per Ortho consult at [facility] done 3/14/21:      R knee XR  static abx spacer with intramedullary metallic rods coated in cement in the femur and tibia  Lucency around the spacer, otherwise unchanged from prior XR    R hip XR  s/p r hip IMN, stable position, healed fracture    A/P: Patient is a 65y year old Male s/p R static abx spacer placement of right knee     The primary surgeon, Dr. Jose Castro of [facility] was contacted regarding the current plan and follow up for this patient  Recommend TDWB (TTWB)  on RLE and WBAT on LLE  Further spacer care per primary team at [facility]  Return to clinic: Orthopaedic Adult Reconstructive with Dr. Jose Castro;  please call [phone] to schedule an appointment after discharge.    The patient will be discharged home on 3/16/21 at his request, and he will follow up with Cardiology Dr. Lopez, Neuro Dr. Navas, also Ortho Dr. Castro and Endo Dr. Cormier at [facility],  and will also refer him to see GI Dr. Tello for his celiac disease, since he does not have a GI doctor, and he did not want to see GI while in-patient. He was advised to  continue gluten-free diet. 65 year old male has medical history of  CAD s/p MI, PCI/CABG?, CHFrEF (15-20%), has ICD, HTN, Celiac disease, DM2, neuropathy, chronic b/l LE ulcers, hx infected R TKR with MRSA (was eventually cemented so has limited ROM R knee),  presented on 3/12/21 with complaint of uncontrollable movements of the upper and lower extremities. Patient has had these movements for over a year, it randomly comes and resolves on its own. It lasts about 15-20 min, where his movements are purposeless. No known exacerbating or relieving factors. Of note, patient reported of losing about 30 lbs in last 6-8 months. Patient was adopted, unknown family history. Patient otherwise denied nausea, vomiting, fever, chills, headaches, SOB, chest pain, abdominal pain, muscle weakness, lower extremity swelling, urinary or bowel habit changes. In ED CT Head negative for acute bleed. On physical exam, random upper extremity movements noted, little bit lower movement. Muscle wasting present in both hands and both legs. and chronic BLE skin ulcerations.     Allergies:  ACE inhibitors (Hives)  carvedilol (Other)  enalapril (Hives)  Entresto (Other)    COVID NEGATIVE 3/12/21    The patient is being followed by Neurology, and he is followed by Cardiology Dr. Lopez in  and Dr. Lilia Caldera in [facility], also by Endo Dr. Cormier at [facility].  Multiple autoimmune, paraneoplastic and celiac markers were ordered; not all are resulted yet, but the celiac markers are all strongly +.  The patient also has a + finding on CT abd/pelvis with IV contrast done 2/5/21:  < from: CT Abdomen and Pelvis w/ IV Cont (02.05.21 @ 16:09) >    IMPRESSION: 3 cm rim-enhancing subcutaneous collection in the right upper quadrant likely connected to a contracted gallbladder at the site of a prior percutaneous cholecystostomy.    < end of copied text >    The patient insists he wants to go home today, 3/16/21.  He has an appointment with Dr. Lopez tomorrow, 3/17, in order to be cleared for the RUQ bx in IR.  Neurology strongly recommends that the patient has an LP in IR as well, however the patient is adamantly refusing at this time. EEG and MRI were negative.  Perhaps he can be convinced to have the LP when he sees Neuro Dr. Navas for follow up in one week.    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  May represent sporadic paroxysmal non-kinesogenic choreoathetosis vs celiac-related progressive chorea but will need to r/o secondary causes (e.g. toxic/metabolic/inflammatory/paraneoplastic).  REEG non epileptiform.  MRI brain showed microvascular changes and chronic lacunar infarct but they don't explain the movements.     Plan:  - Continue Clonazepam 0.5mg TID   - Positive for transglutaminase antibodies and anti TPO    - Follow up blood work  (paraneoplastic markers, autoimmune markers (OBED, Sjogerns antibodies, lupus, etc.), SPEP/UPEP, ACE level, HgbA1C, CRMP5/CV2 abs, NMDA receptor abs, CASPR2 ab, LGI1 ab)   - GI consult for celiac   ***- LP - check **CSF for cell ct, glc, protein, paraneoplastic markers, ACE level, celiac autoabs, NMDA receptor Ab, voltage gated K-channel abs, cytology, flow cytometry, viral encephalitis panel**  - Possible discharge after LP --- the patient is refusing at this time; he will discuss with Dr. Navas when he follows up in one week    Writer called and spoke to the patient's wife, Raine; she stated, and writer noticed this as well, that her  has been very sedated  since being started on klonopin 0.5mg po q8h, which has been controlling the choreiform movements. Dose was decreased to 0.25mg po  q8h for discharge. All Raine's questions and concerns were addressed, and we reviewed all her 's meds and Rx that he needs  were sent to his pharmacy.    He was getting only 20mg po cymbalta daily while here, however his home dose is 30mg po q12h, Raine read the directions on the bottle and is was   prescribed by Dr. Cormier, the patient's endocrinologist. The patient has an insulin pump with Humalog, and he also takes jardiance 10mg po q AM, which  he can resume at home.    The patient will also need to follow up with Ortho at [facility] re: his R TKR; per Ortho consult at [facility] done 3/14/21:      R knee XR  static abx spacer with intramedullary metallic rods coated in cement in the femur and tibia  Lucency around the spacer, otherwise unchanged from prior XR    R hip XR  s/p r hip IMN, stable position, healed fracture    A/P: Patient is a 65y year old Male s/p R static abx spacer placement of right knee     The primary surgeon, Dr. Jose Castro of [facility], was contacted regarding the current plan and follow up for this patient--  Recommend TDWB (TTWB)  on RLE and WBAT on LLE  Further spacer care per primary team at [facility]  Return to clinic: Orthopaedic Adult Reconstructive with Dr. Jose Castro;  please call [phone] to schedule an appointment after discharge.    The patient will be discharged home on 3/16/21 at his request, and he will follow up with Cardiology Dr. Lopez, Neuro Dr. Navas, also Ortho Dr. Castro and Endo Dr. Cormier at [facility],  and the patient was also given a referral to see GI Dr. Tello for his celiac disease, since he does not have a GI doctor, and he did not want to see GI while in-patient. He was advised to  continue gluten-free diet, along with diabetic heart healthy diet.

## 2021-03-16 NOTE — CHART NOTE - NSCHARTNOTEFT_GEN_A_CORE
65 year old male has medical history of  CAD s/p MI, PCI/CABG?, CHFrEF (15-20%), has ICD, HTN, Celiac disease, DM2, neuropathy, chronic b/l LE ulcers, hx infected R TKR with MRSA (was eventually cemented so has limited ROM R knee),  presented on 3/12/21 with complaint of uncontrollable movements of the upper and lower extremities. Patient has had these movements for over a year, it randomly comes and resolves on its own. It lasts about 15-20 min, where his movements are purposeless. No known exacerbating or relieving factors. Of note, patient reported of losing about 30 lbs in last 6-8 months. Patient was adopted, unknown family history. Patient otherwise denied nausea, vomiting, fever, chills, headaches, SOB, chest pain, abdominal pain, muscle weakness, lower extremity swelling, urinary or bowel habit changes. In ED CT Head negative for acute bleed. On physical exam, random upper extremity movements noted, little bit lower movement. Muscle wasting present in both hands and both legs. and chronic BLE skin ulcerations.     Allergies:  ACE inhibitors (Hives)  carvedilol (Other)  enalapril (Hives)  Entresto (Other)    COVID NEGATIVE 3/12/21    The patient is being followed by Neurology, and he is followed by Cardiology Dr. Lopez in  and Dr. Lilia Caldera in [facility], also by Endo Dr. Cormier at [facility].  Multiple autoimmune, paraneoplastic and celiac markers were ordered; not all are resulted yet, but the celiac markers are all strongly +.  The patient also has a + finding on CT abd/pelvis with IV contrast done 2/5/21:  < from: CT Abdomen and Pelvis w/ IV Cont (02.05.21 @ 16:09) >    IMPRESSION: 3 cm rim-enhancing subcutaneous collection in the right upper quadrant likely connected to a contracted gallbladder at the site of a prior percutaneous cholecystostomy.    < end of copied text >    The patient insists he wants to go home today.  He has an appointment with Dr. Lopez tomorrow, 3/17, in order to be cleared for the RUQ bx in IR.  Neurology strongly recommends that the patient has an LP in IR as well, however the patient is adamantly refusing at this time. EEG and MRI were negative.  Perhaps he can be convinced to have the LP when he sees Neuro Dr. Navas for follow up in one week.    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  May represent sporadic paroxysmal non-kinesogenic choreoathetosis vs celiac-related progressive chorea but will need to r/o secondary causes (e.g. toxic/metabolic/inflammatory/paraneoplastic).  REEG non epileptiform.  MRI brain showed microvascular changes and chronic lacunar infarct but they don't explain the movements.     Plan:  - Continue Clonazepam 0.5mg TID   - Positive for transglutaminase antibodies and anti TPO    - Follow up blood work  (paraneoplastic markers, autoimmune markers (OBED, Sjogerns antibodies, lupus, etc.), SPEP/UPEP, ACE level, HgbA1C, CRMP5/CV2 abs, NMDA receptor abs, CASPR2 ab, LGI1 ab)   - GI consult for celiac   ***- LP - check **CSF for cell ct, glc, protein, paraneoplastic markers, ACE level, celiac autoabs, NMDA receptor Ab, voltage gated K-channel abs, cytology, flow cytometry, viral encephalitis panel**  - Possible discharge after LP --- the patient is refusing at this time; he will discuss with Dr. Navas when he follows up in one week    Writer called and spoke to the patient's wife, Raine; she stated, and writer noticed this as well, that her  has been very sedated  since being started on klonopin 0.5mg po q8h, which has been controlling the choreiform movements. Dose was decreased to 0.25mg po  q8h for discharge. All Raine's questions and concerns were addressed, and we reviewed all her 's meds and Rx that he needs  were sent to his pharmacy.    He was getting only 20mg po cymbalta daily while here, however his home dose is 30mg po q12h, Raine read the directions on the bottle and is was   prescribed by Dr. Cormier, the patient's endocrinologist. The patient has an insulin pump with Humalog, and he also takes jardiance 10mg po q AM, which  he can resume at home.    The patient will also need to follow up with Ortho at [facility] re: his R TKR; per Ortho consult at [facility] done 3/14/21:    R knee XR  static abx spacer with intramedullary metallic rods coated in cement in the femur and tibia  Lucency around the spacer, otherwise unchanged from prior XR    R hip XR  s/p r hip IMN, stable position, healed fracture    A/P: Patient is a 65y year old Male s/p R static abx spacer placement of right knee     The primary surgeon, Dr. Jose Castro of [facility] was contacted regarding the current plan and follow up for this patient  Recommend TDWB (TTWB)  on RLE and WBAT on LLE  Further spacer care per primary team at [facility]  Return to clinic: Orthopaedic Adult Reconstructive with Dr. Jose Castro  please call [phone] to schedule an appointment after discharge.    MEDICATIONS  (STANDING):  aspirin enteric coated 81 milliGRAM(s) Oral daily  atorvastatin 80 milliGRAM(s) Oral at bedtime  chlorhexidine 4% Liquid 1 Application(s) Topical <User Schedule>  clonazePAM  Tablet 0.25 milliGRAM(s) Oral every 8 hours  digoxin     Tablet 125 MICROGram(s) Oral daily  DULoxetine 20 milliGRAM(s) Oral daily  enoxaparin Injectable 40 milliGRAM(s) SubCutaneous at bedtime  metoprolol succinate ER 25 milliGRAM(s) Oral daily  prasugrel 10 milliGRAM(s) Oral daily  spironolactone 25 milliGRAM(s) Oral daily    MEDICATIONS  (PRN):  furosemide    Tablet 40 milliGRAM(s) Oral daily PRN leg swelling  melatonin 10 milliGRAM(s) Oral at bedtime PRN Insomnia  morphine  IR 30 milliGRAM(s) Oral daily PRN pain      Vital Signs Last 24 Hrs  T(C): 35.2 (16 Mar 2021 13:21), Max: 36.3 (15 Mar 2021 21:09)  T(F): 95.4 (16 Mar 2021 13:21), Max: 97.3 (15 Mar 2021 21:09)  HR: 60 (16 Mar 2021 13:21) (60 - 79)  BP: 111/57 (16 Mar 2021 13:21) (90/52 - 133/69)  BP(mean): --  RR: 18 (16 Mar 2021 13:21) (18 - 18)  SpO2: 100% (16 Mar 2021 06:11) (99% - 100%)      LABS:             10.1   11.49 )-----------( 325      ( 16 Mar 2021 04:30 )             34.0     03-16    134<L>  |  101  |  41<H>  ----------------------------<  187<H>  5.0   |  25  |  1.1    Ca    8.8      16 Mar 2021 04:30      The patient will be discharged home at his request, and he will follow up with Cardiology Dr. Lopez, Neuro Dr. Navas, Ortho Dr. Castro and Endo Dr. Cormier at [facility],  and will also refer him to see GI Dr. Tello for his celiac disease, since he does not have a GI doctor, and he did not want to see GI while in-patient. He was advised to  continue gluten-free diet. 65 year old male has medical history of  CAD s/p MI, PCI/CABG?, CHFrEF (15-20%), has ICD, HTN, Celiac disease, DM2, neuropathy, chronic b/l LE ulcers, hx infected R TKR with MRSA (was eventually cemented so has limited ROM R knee),  presented on 3/12/21 with complaint of uncontrollable movements of the upper and lower extremities. Patient has had these movements for over a year, it randomly comes and resolves on its own. It lasts about 15-20 min, where his movements are purposeless. No known exacerbating or relieving factors. Of note, patient reported of losing about 30 lbs in last 6-8 months. Patient was adopted, unknown family history. Patient otherwise denied nausea, vomiting, fever, chills, headaches, SOB, chest pain, abdominal pain, muscle weakness, lower extremity swelling, urinary or bowel habit changes. In ED CT Head negative for acute bleed. On physical exam, random upper extremity movements noted, little bit lower movement. Muscle wasting present in both hands and both legs. and chronic BLE skin ulcerations.     Allergies:  ACE inhibitors (Hives)  carvedilol (Other)  enalapril (Hives)  Entresto (Other)    COVID NEGATIVE 3/12/21    The patient is being followed by Neurology, and he is followed by Cardiology Dr. Lopez in  and Dr. Lilia Caldera in [facility], also by Endo Dr. Cormier at [facility].  Multiple autoimmune, paraneoplastic and celiac markers were ordered; not all are resulted yet, but the celiac markers are all strongly +.  The patient also has a + finding on CT abd/pelvis with IV contrast done 2/5/21:  < from: CT Abdomen and Pelvis w/ IV Cont (02.05.21 @ 16:09) >    IMPRESSION: 3 cm rim-enhancing subcutaneous collection in the right upper quadrant likely connected to a contracted gallbladder at the site of a prior percutaneous cholecystostomy.    < end of copied text >    The patient insists he wants to go home today.  He has an appointment with Dr. Lopez tomorrow, 3/17, in order to be cleared for the RUQ bx in IR.  Neurology strongly recommends that the patient has an LP in IR as well, however the patient is adamantly refusing at this time. EEG and MRI were negative.  Perhaps he can be convinced to have the LP when he sees Neuro Dr. Navas for follow up in one week.    FROM NEURO PROGRESS NOTE 3/16:     Assessment and Plan:   · Assessment	  64 yo CAD s/p MI, PCI /CABG?, CHFrEF (15-20%), has ICD, HTN, Celiac disease, DM, neuropathy, chronic b/l LE ulcers presents with  paroxysmal choreiform movements of the upper and lower extremities of unclear etiology and duration.  May represent sporadic paroxysmal non-kinesogenic choreoathetosis vs celiac-related progressive chorea but will need to r/o secondary causes (e.g. toxic/metabolic/inflammatory/paraneoplastic).  REEG non epileptiform.  MRI brain showed microvascular changes and chronic lacunar infarct but they don't explain the movements.     Plan:  - Continue Clonazepam 0.5mg TID   - Positive for transglutaminase antibodies and anti TPO    - Follow up blood work  (paraneoplastic markers, autoimmune markers (OBED, Sjogerns antibodies, lupus, etc.), SPEP/UPEP, ACE level, HgbA1C, CRMP5/CV2 abs, NMDA receptor abs, CASPR2 ab, LGI1 ab)   - GI consult for celiac   ***- LP - check **CSF for cell ct, glc, protein, paraneoplastic markers, ACE level, celiac autoabs, NMDA receptor Ab, voltage gated K-channel abs, cytology, flow cytometry, viral encephalitis panel**      Writer called and spoke to the patient's wife, Raine; she stated, and writer noticed this as well, that her  has been very sedated  since being started on klonopin 0.5mg po q8h, which has been controlling the choreiform movements. Dose was decreased to 0.25mg po  q8h for discharge. All Raine's questions and concerns were addressed, and we reviewed all her 's meds and Rx that he needs  were sent to his pharmacy.    He was getting only 20mg po cymbalta daily while here, however his home dose is 30mg po q12h, Raine read the directions on the bottle and is was   prescribed by Dr. Cormier, the patient's endocrinologist. The patient has an insulin pump with Humalog, and he also takes jardiance 10mg po q AM, which  he can resume at home.    The patient will also need to follow up with Ortho at [facility] re: his R TKR; per Ortho consult at [facility] done 3/14/21:    R knee XR  static abx spacer with intramedullary metallic rods coated in cement in the femur and tibia  Lucency around the spacer, otherwise unchanged from prior XR    R hip XR  s/p r hip IMN, stable position, healed fracture    A/P: Patient is a 65y year old Male s/p R static abx spacer placement of right knee     The primary surgeon, Dr. Jose Castro of [facility] was contacted regarding the current plan and follow up for this patient  Recommend TDWB (TTWB)  on RLE and WBAT on LLE  Further spacer care per primary team at [facility]  Return to clinic: Orthopaedic Adult Reconstructive with Dr. Jose Castro  please call [phone] to schedule an appointment after discharge.    MEDICATIONS  (STANDING):  aspirin enteric coated 81 milliGRAM(s) Oral daily  atorvastatin 80 milliGRAM(s) Oral at bedtime  chlorhexidine 4% Liquid 1 Application(s) Topical <User Schedule>  clonazePAM  Tablet 0.25 milliGRAM(s) Oral every 8 hours  digoxin     Tablet 125 MICROGram(s) Oral daily  DULoxetine 20 milliGRAM(s) Oral daily  enoxaparin Injectable 40 milliGRAM(s) SubCutaneous at bedtime  metoprolol succinate ER 25 milliGRAM(s) Oral daily  prasugrel 10 milliGRAM(s) Oral daily  spironolactone 25 milliGRAM(s) Oral daily    MEDICATIONS  (PRN):  furosemide    Tablet 40 milliGRAM(s) Oral daily PRN leg swelling  melatonin 10 milliGRAM(s) Oral at bedtime PRN Insomnia  morphine  IR 30 milliGRAM(s) Oral daily PRN pain      Vital Signs Last 24 Hrs  T(C): 35.2 (16 Mar 2021 13:21), Max: 36.3 (15 Mar 2021 21:09)  T(F): 95.4 (16 Mar 2021 13:21), Max: 97.3 (15 Mar 2021 21:09)  HR: 60 (16 Mar 2021 13:21) (60 - 79)  BP: 111/57 (16 Mar 2021 13:21) (90/52 - 133/69)  BP(mean): --  RR: 18 (16 Mar 2021 13:21) (18 - 18)  SpO2: 100% (16 Mar 2021 06:11) (99% - 100%)      LABS:             10.1   11.49 )-----------( 325      ( 16 Mar 2021 04:30 )             34.0     03-16    134<L>  |  101  |  41<H>  ----------------------------<  187<H>  5.0   |  25  |  1.1    Ca    8.8      16 Mar 2021 04:30      The patient will be discharged home at his request, and he will follow up with Cardiology Dr. Lopez, Neuro Dr. Navas, Ortho Dr. Castro and Endo Dr. Cormier at [facility],  and will also refer him to see GI Dr. Tello for his celiac disease, since he does not have a GI doctor, and he did not want to see GI while in-patient. He was advised to  continue gluten-free diet.      Attending attestation:  I have examined and discussed this patient with PACHECO Herrera.  Clinically patient no longer has any further involuntary movements of his extremities which he has been experiencing intermittent x past couple of years.  Clonazepam helps but too sedating.  Agree with lowering the dose.  Pt's MRI brain result reviewed and discussed with him - no acute pathology.  The w/up for autoimmune / paraneoplastic syndrome pending.  He is positive for transglutaminase Abs and anti TPO which he already knew before and was told he has celiac disease.  However, he states that gluten free diet did not help him.  He was advised by neuro to have LP which he is deferring as he wants to go home today.  He has apptm with his cardiologist tomorrow for pre-op evaluation which is needed prior to planned R knee revision surgery.  He is also deferring repeat abd CT as recommended by IR to further evaluate subcutaneous collection RUQ, suspected related to previous GB infection for which he underwent percutaneous drainage but did not have cholecystectomy.  He understands that his evaluation is incomplete but states he will f/up with his PCP , GI, orthopedic and has agree to f/up with neuro OP basis.  He will be dc'ed to home today as discussed.  - Possible discharge after LP --- the patient is refusing at this time; he will discuss with Dr. Navas when he follows up in one week

## 2021-03-16 NOTE — DISCHARGE NOTE PROVIDER - CARE PROVIDER_API CALL
Mihir Lopez)  Cardiovascular Disease; Interventional Cardiology  [address] [place] [address]  Phone: [phone]  Fax: [phone]  Established Patient  Scheduled Appointment: 03/17/2021    Deny Navas)  Neuromuscular Medicine  [address] [place] [address], [address]  [place], [place] [id number]  Phone: [phone]  Fax: [phone]  Follow Up Time: 1 week    Jose Castro)  Orthopedics  [address] [place] [address]  Phone: [phone]  Fax: [phone]  Established Patient  Follow Up Time: 2 weeks    Michael Cormier)  [address]  [address], [address]  [place] [address]  Phone: [phone]  Fax: [phone]  Established Patient  Follow Up Time: 2 weeks    Lilia Caldera)  Cardiovascular Disease; Internal Medicine  [address] [place] [address]  Phone: [phone]  Fax: [phone]  Established Patient  Follow Up Time: 2 weeks    Sudhir Tello)  Gastroenterology; Internal Medicine  [facility] [place] [address]  Phone: [phone]  Fax: [phone]  Follow Up Time: 1 week

## 2021-03-16 NOTE — DISCHARGE NOTE PROVIDER - NSDCFUADDINST_GEN_ALL_CORE_FT
***YOU ARE TOE-TOUCH WEIGHT  BEARING ON YOUR RIGHT LOWER EXTREMITY and WEIGHT-BEARING AS TOLERATED ON YOUR LEFT LOWER EXTREMITY; PLEASE FOLLOW UP WITH DR. CASTLE, YOUR ORTHOPEDIST, FOR FURTHER EVALUATION AND TREATMENT***

## 2021-03-16 NOTE — DISCHARGE NOTE PROVIDER - PROVIDER TOKENS
PROVIDER:[TOKEN:[[id number]],SCHEDULEDAPPT:[03/17/2021],ESTABLISHEDPATIENT:[T]],PROVIDER:[TOKEN:[[id number]],FOLLOWUP:[1 week]],PROVIDER:[TOKEN:[[id number]],FOLLOWUP:[2 weeks],ESTABLISHEDPATIENT:[T]],PROVIDER:[TOKEN:[[id number]],FOLLOWUP:[2 weeks],ESTABLISHEDPATIENT:[T]],PROVIDER:[TOKEN:[[id number]],FOLLOWUP:[2 weeks],ESTABLISHEDPATIENT:[T]],PROVIDER:[TOKEN:[[id number]],FOLLOWUP:[1 week]]

## 2021-03-16 NOTE — DISCHARGE NOTE PROVIDER - NSDCMRMEDTOKEN_GEN_ALL_CORE_FT
ALPRAZolam 0.5 mg oral tablet: 0.5 tab (1/2 tab) orally every 8 hours (3 times a day) MDD:1.5  aspirin 81 mg oral delayed release tablet: 1 tab(s) orally 2 times a day  digoxin 125 mcg (0.125 mg) oral tablet: 1 tab(s) orally once a day  DULoxetine 30 mg oral delayed release capsule: 1 cap(s) orally 2 times a day (every 12 hours)  furosemide 40 mg oral tablet: 1 tab(s) orally once a day, As Needed, for leg swelling  insulin: pump  Jardiance 10 mg oral tablet: 1 tab(s) orally once a day (in the morning)  melatonin 10 mg oral tablet: 1 tab(s) orally once a day (at bedtime), As needed, Insomnia  metoprolol succinate 25 mg oral tablet, extended release: 1 tab(s) orally once a day  morphine 30 mg oral tablet: 1 tab(s) orally 1 or 2 times a day, As Needed - for severe pain (7 to 10 out of 10)  pantoprazole 40 mg oral delayed release tablet: 1 tab(s) orally once a day in the morning; take 30 minutes before breakfast  prasugrel 10 mg oral tablet: 1 tab(s) orally once a day  rosuvastatin 40 mg oral tablet: 1 tab(s) orally once a day (at bedtime)  spironolactone 25 mg oral tablet: 1 tab(s) orally once a day

## 2021-03-16 NOTE — PROGRESS NOTE ADULT - ASSESSMENT
66 yo CAD s/p MI, PCI /CABG?, CHFrEF (15-20%), has ICD, HTN, Celiac disease, DM, neuropathy, chronic b/l LE ulcers presents with  paroxysmal choreiform movements of the upper and lower extremities of unclear etiology and duration.  May represent sporadic paroxysmal non-kinesogenic choreoathetosis vs celiac-related progressive chorea but will need to r/o secondary causes (e.g. toxic/metabolic/inflammatory/paraneoplastic).  REEG non epileptiform.  MRI brain showed microvascular changes and chronic lacunar infarct but they dont explain the movements.     Plan:  - Continue Clonazepam 0.5mg TID   - Positive for transglutaminase antibodies and anti TPO    - Follow up blood work  (paraneoplastic markers, autoimmune markers (OBED, Sjogerns antibodies, lupus, etc.), SPEP/UPEP, ACE level, HgbA1C, CRMP5/CV2 abs, NMDA receptor abs, CASPR2 ab, LGI1 ab)   - GI consult for celiac   - LP - check CSF for cell ct, glc, protein, paraneoplastic markers, ACE level, celiac autoabs, NMDA receptor Ab, voltage gated K-channel abs, cytology, flow cytometry, viral encephalitis panel  - Possible discharge after LP

## 2021-03-16 NOTE — DISCHARGE NOTE PROVIDER - NSDCCPCAREPLAN_GEN_ALL_CORE_FT
PRINCIPAL DISCHARGE DIAGNOSIS  Diagnosis: Involuntary movements  Assessment and Plan of Treatment: You are being followed by Neurology for the chorea type involuntary movements of your extremities for the past one year. You are undergoing an extensive work-up to find the cause; it could be due to inflammatory or autoimmune disease such as celiac disease, or it could be paraneoplastic. The neurologist  strongly recommends that you have a lumbar puncture (spinal tap) done in IR (interventional radiology) in order to obtain and send your spinal fluid for multiple tests. You did not want to have this done during this admission. Please follow up with your Neurologist, Dr. Navas, in ONE WEEK, to discuss the results of your blood tests and for further evaluation and work-up, including arranging for you to have the LP (lumbar puncture) in the near future.      SECONDARY DISCHARGE DIAGNOSES  Diagnosis: DM (diabetes mellitus)  Assessment and Plan of Treatment: Continue to follow diet and use insulin pump as directed, also continue taking jardiance 10mg orally every morning. Check your blood sugar before meals and follow up closely with your Endocrinologist, Dr. Cormier    Diagnosis: CAD (coronary artery disease)  Assessment and Plan of Treatment: Continue diet and medications as prescribed; you also have an AICD and history of heart failure. It is very important that you weigh yourself every morning before breakfast and notify your doctor, Dr. Lopez, if you are more short of breath, or if you gain more than 3 pounds in one day. Follow up with your Cardiologists often.    Diagnosis: Celiac disease  Assessment and Plan of Treatment: Your blood tests are highly positive for celiac disease, and it is recommended that you follow a gluten-free diet and see a gastroenterologist for close monitoring and follow up. You also have a collection in your right upper quadrant that needs to be biopsied by the Interventional Radiologist (IR);  please follow up with IR as well as with the GI doctor. A gastroenterologist has been referred for you to follow up with. Please make an appointment ASAP.

## 2021-03-16 NOTE — PROGRESS NOTE ADULT - SUBJECTIVE AND OBJECTIVE BOX
Neurology Follow up note      Interval History -  No acute overnight issue. Brain MRI showed microvascular changes otherwise no acute infarct.         Vital Signs Last 24 Hrs  T(C): 35.6 (16 Mar 2021 06:11), Max: 36.3 (15 Mar 2021 21:09)  T(F): 96 (16 Mar 2021 06:11), Max: 97.3 (15 Mar 2021 21:09)  HR: 79 (16 Mar 2021 06:11) (60 - 79)  BP: 133/69 (16 Mar 2021 06:11) (90/52 - 133/69)  BP(mean): --  RR: 18 (16 Mar 2021 06:11) (18 - 18)  SpO2: 100% (16 Mar 2021 06:11) (99% - 100%)  ICU Vital Signs Last 24 Hrs  T(C): 35.6 (16 Mar 2021 06:11), Max: 36.3 (15 Mar 2021 21:09)  T(F): 96 (16 Mar 2021 06:11), Max: 97.3 (15 Mar 2021 21:09)  HR: 79 (16 Mar 2021 06:11) (60 - 79)  BP: 133/69 (16 Mar 2021 06:11) (90/52 - 133/69)  BP(mean): --  ABP: --  ABP(mean): --  RR: 18 (16 Mar 2021 06:11) (18 - 18)  SpO2: 100% (16 Mar 2021 06:11) (99% - 100%)          Neurological Exam:   Mental status: Mildly lethargic but wakes up and answers questions. He is  oriented x3. Naming, repetition and comprehension intact.   Cranial nerves: Pupils equally round and reactive to light, , no nystagmus, Face is symmetric   Motor:  5/5 throughout/ no abnormal movements noted currently   Sensation: Intact and symmetric  Coordination: No dysmetria or limb ataxia  Reflexes: 1+ in bilateral UE/LE, downgoing toes bilaterally. (-) Montemayor.  Gait: deferred      Medications  aspirin enteric coated 81 milliGRAM(s) Oral daily  atorvastatin 80 milliGRAM(s) Oral at bedtime  chlorhexidine 4% Liquid 1 Application(s) Topical <User Schedule>  clonazePAM  Tablet 0.25 milliGRAM(s) Oral every 8 hours  digoxin     Tablet 125 MICROGram(s) Oral daily  DULoxetine 20 milliGRAM(s) Oral daily  enoxaparin Injectable 40 milliGRAM(s) SubCutaneous at bedtime  furosemide    Tablet 40 milliGRAM(s) Oral daily PRN  melatonin 10 milliGRAM(s) Oral at bedtime PRN  metoprolol succinate ER 25 milliGRAM(s) Oral daily  morphine  IR 30 milliGRAM(s) Oral daily PRN  prasugrel 10 milliGRAM(s) Oral daily  spironolactone 25 milliGRAM(s) Oral daily      Lab                        10.1   11.49 )-----------( 325      ( 16 Mar 2021 04:30 )             34.0     03-16    134<L>  |  101  |  41<H>  ----------------------------<  187<H>  5.0   |  25  |  1.1    Ca    8.8      16 Mar 2021 04:30      CAPILLARY BLOOD GLUCOSE        Brain MRI:   IMPRESSION:    1.  Motion limited study. No evidence of acute intracranial abnormality.    2.  Mild-moderate chronic microvascular changes, parenchymal volume loss and scattered chronic lacunar infarcts.

## 2021-03-16 NOTE — DISCHARGE NOTE PROVIDER - INSTRUCTIONS
Diabetic heart healthy gluten-free; eat a high protein diabetic snack every evening after dinner, about 2 to 3 hours before bedtime

## 2021-03-16 NOTE — PHYSICAL THERAPY INITIAL EVALUATION ADULT - SPECIFY REASON(S)
Pt is cleared for OOB activity with PT, however, pt is refusing to participate despite encouragement and education. Oksana Cullen(PA) and Dr Stone(Resident) aware. PT will f/u as appropriate.
Pt refused to participate in IE despite encouragement. Pt states that he is going home today. RN(Cheryl) made aware. PT will f/u as appropriate.
Attempted to see pt for PT however pt mentioned re R knee with spacers and TTWB at home. Spoke to hospitalist MD Haile re Ortho consult for wt bearing status. Will f/u as appropriate.
No activity orders from MD. Called ext [phone] however, no reply. Will follow-up

## 2021-03-16 NOTE — DISCHARGE NOTE PROVIDER - CARE PROVIDERS DIRECT ADDRESSES
,DirectAddress_Unknown,[email or web address]_Unknown,[email or web address]

## 2021-03-17 LAB
COPPER SERPL-MCNC: 124 UG/DL — SIGNIFICANT CHANGE UP (ref 69–132)
DRVVT SCREEN TO CONFIRM RATIO: SIGNIFICANT CHANGE UP
DSDNA AB FLD-ACNC: <0.2 AI — SIGNIFICANT CHANGE UP
ENA SS-A AB FLD IA-ACNC: <0.2 AI — SIGNIFICANT CHANGE UP
ENDOMYSIUM IGA TITR SER IF: POSITIVE
ENDOMYSIUM IGA TITR SER: ABNORMAL
LA NT DPL PPP QL: SIGNIFICANT CHANGE UP
LGI1 AB IGG SCREEN BY IFA: SIGNIFICANT CHANGE UP
NORMALIZED SCT PPP-RTO: 0.78 RATIO — SIGNIFICANT CHANGE UP (ref 0–1.16)
NORMALIZED SCT PPP-RTO: SIGNIFICANT CHANGE UP

## 2021-03-20 NOTE — BEHAVIORAL HEALTH ASSESSMENT NOTE - HPI (INCLUDE ILLNESS QUALITY, SEVERITY, DURATION, TIMING, CONTEXT, MODIFYING FACTORS, ASSOCIATED SIGNS AND SYMPTOMS)
JOINT PAIN Pt is a 66yo English-speaking  White cisgender man, retired from [employer], domiciled with wife in [place], [place], with a self-reported psychiatric history of anxiety and medical history of hyperlipidemia, type II DM with peripheral neuropathy, CAD s/p MIDCAB (2018)/VERONICA, HFrEF, AICD (2/20), pulmonary HTN, recurrent R knee PPJI for which he was last admitted about 3 weeks ago, who presented to the ED from home on 9/1 with recurrent right knee pain.  Pt has had multiple medical complications following total knee replacement in 10/2019 including recurrent infection, hip fracture, and CHF exacerbation, leading to extensive antibiotic exposure and multiple prolonged hospitalizations. Pt now admitted for management of persistent MRSA knee joint infection. Psychiatry consulted for assessment of depressive symptoms and difficulty coping with multiple prolonged hospitalizations.    Per chart review, pt also evaluated by  psychiatry in February 2020 with concern for delirium (cognitive deficits, disorientation, perceptual disturbances) and mild adjustment symptoms, referred to psychotherapy as outpatient.    On evaluation this afternoon with Sharon Sofia, psychology intern, pt was calm, cooperative, quite talkative, with overinclusive circumstantial speech and periods of tearfulness when discussing stressors. He endorsed worsening depressive symptoms in the context of repeated medical hospitalizations over the past two years, including sudden tearfulness, sleep difficulties (4-5h/night), and notable cognitive decline (e.g., short term memory issues, word-finding difficulty, fine motor changes such as difficulty writing and putting on his shirt - observed repeatedly putting shirt on backwards during assessment). He endorsed periods of demoralization and frequent heightened anxiety about his medical conditions, including worry that he may need a leg amputation, as well as ruminations about changes in his life in the last year (previously working full time for [employer]) and his current loss of "control". He denied sustained low mood, hopelessness, anhedonia, or any SI. He denied symptoms suggestive of jim or psychosis (reports h/o perceptual disturbances during prior hospitalizations for acute illness). He denied any excessive alcohol use or illicit substance use. He denied current outpatient psychiatric care though expressed interest in psychotherapy and possibly medication for anxiety and mood lability. He reports receiving Valium for anxiety in hospital but does not find helpful and would prefer a preventative medication for anxiety.    Pt reported history of anxiety previously treated with Paxil ?decades ago with good effect, with switch to Cymbalta ?years ago for co-treatment of diabetic neuropathy. He reports that he now takes Neurontin (per chart 100mg Q8H) instead of Cymbalta. He denies history of psychiatric hospitalizations or suicide attempts.

## 2021-03-24 LAB
6-ACETYLMORPHINE, UR RESULT: NEGATIVE NG/ML — SIGNIFICANT CHANGE UP
6MAM UR CFM-MCNC: NEGATIVE NG/ML — SIGNIFICANT CHANGE UP
CODEINE UR CFM-MCNC: NEGATIVE NG/ML — SIGNIFICANT CHANGE UP
CODEINE, UR RESULT: NEGATIVE NG/ML — SIGNIFICANT CHANGE UP
HYDROCODONE UR QL CFM: NEGATIVE NG/ML — SIGNIFICANT CHANGE UP
HYDROCODONE, UR RESULT: NEGATIVE NG/ML — SIGNIFICANT CHANGE UP
HYDROMORPHONE UR QL CFM: NEGATIVE NG/ML — SIGNIFICANT CHANGE UP
HYDROMORPHONE, UR RESULT: NEGATIVE NG/ML — SIGNIFICANT CHANGE UP
MORPHINE UR QL CFM: 239 NG/ML — SIGNIFICANT CHANGE UP
MORPHINE, UR RESULT: 239 NG/ML — SIGNIFICANT CHANGE UP
NOROXYCODONE (OPIATES), UR RESULT: NEGATIVE NG/ML — SIGNIFICANT CHANGE UP
NOROXYCODONE UR CFM-MCNC: NEGATIVE NG/ML — SIGNIFICANT CHANGE UP
OPIATES IN-HOUSE INTERPRETATION: POSITIVE
OPIATES UR QL CFM: POSITIVE
OXYCODONE (OPIATES), UR RESULT: NEGATIVE NG/ML — SIGNIFICANT CHANGE UP
OXYCODONE UR-MCNC: NEGATIVE NG/ML — SIGNIFICANT CHANGE UP
OXYMORPHONE (OPIATES), UR RESULT: NEGATIVE NG/ML — SIGNIFICANT CHANGE UP
OXYMORPHONE UR CFM-MCNC: NEGATIVE NG/ML — SIGNIFICANT CHANGE UP

## 2021-03-25 NOTE — CDI QUERY NOTE - NSCDIOTHERTXTBX_GEN_ALL_CORE_HH
Dr. Stone,    64 y/o male admitted with choreiform movements of upper and lower extremities.  Also has known Celiac Disease.  Progress note 3/12/21 indicates:  "Chronic Severe Malnutrition- nutrition consult"  No nutritional consult done prior to discharge.  Diet order 3/12/21:  DASH/TLC restricted sodium, cholesterol and gluten.  BMI 23.5  Per discharge note:  "Pt reported loosing about 30 pounds over the past 6-8 months.    Muscle wasting present in both hands and legs, and chronic BLE skin ulcerations"    Based on the clinical indicators and your professional judgement, please complete by  selecting one of the following options below if severe malnutrition can be further clarified:    -The patient has an atypical presentation of the diagnosis in question as detailed in the following;     (please document the clinical evidence relied upon to make this diagnosis:___________________  -This diagnosis is confirmed based on my clinical opinion, but not limited to my examination of the patient,     evaluation of the diagnostic  data, and my clinical decision making based on the clinical indicators    as follows: ____________________________________.  -This diagnosis was evaluated and after study, was Ruled Out, which I have recorded in the medical    record.  -Other (please specify)  -Unable to clinically determine    Thank you  Janet Hughes

## 2021-03-28 DIAGNOSIS — L97.929 NON-PRESSURE CHRONIC ULCER OF UNSPECIFIED PART OF LEFT LOWER LEG WITH UNSPECIFIED SEVERITY: ICD-10-CM

## 2021-03-28 DIAGNOSIS — I25.10 ATHEROSCLEROTIC HEART DISEASE OF NATIVE CORONARY ARTERY WITHOUT ANGINA PECTORIS: ICD-10-CM

## 2021-03-28 DIAGNOSIS — I25.2 OLD MYOCARDIAL INFARCTION: ICD-10-CM

## 2021-03-28 DIAGNOSIS — L97.919 NON-PRESSURE CHRONIC ULCER OF UNSPECIFIED PART OF RIGHT LOWER LEG WITH UNSPECIFIED SEVERITY: ICD-10-CM

## 2021-03-28 DIAGNOSIS — G25.5 OTHER CHOREA: ICD-10-CM

## 2021-03-28 DIAGNOSIS — E11.9 TYPE 2 DIABETES MELLITUS WITHOUT COMPLICATIONS: ICD-10-CM

## 2021-03-28 DIAGNOSIS — Z95.810 PRESENCE OF AUTOMATIC (IMPLANTABLE) CARDIAC DEFIBRILLATOR: ICD-10-CM

## 2021-03-28 DIAGNOSIS — M19.90 UNSPECIFIED OSTEOARTHRITIS, UNSPECIFIED SITE: ICD-10-CM

## 2021-03-28 DIAGNOSIS — R25.9 UNSPECIFIED ABNORMAL INVOLUNTARY MOVEMENTS: ICD-10-CM

## 2021-03-28 DIAGNOSIS — I11.0 HYPERTENSIVE HEART DISEASE WITH HEART FAILURE: ICD-10-CM

## 2021-03-28 DIAGNOSIS — Z96.659 PRESENCE OF UNSPECIFIED ARTIFICIAL KNEE JOINT: ICD-10-CM

## 2021-03-28 DIAGNOSIS — Z96.41 PRESENCE OF INSULIN PUMP (EXTERNAL) (INTERNAL): ICD-10-CM

## 2021-03-28 DIAGNOSIS — E43 UNSPECIFIED SEVERE PROTEIN-CALORIE MALNUTRITION: ICD-10-CM

## 2021-03-28 DIAGNOSIS — K90.0 CELIAC DISEASE: ICD-10-CM

## 2021-03-28 DIAGNOSIS — Z95.5 PRESENCE OF CORONARY ANGIOPLASTY IMPLANT AND GRAFT: ICD-10-CM

## 2021-03-28 DIAGNOSIS — Z79.82 LONG TERM (CURRENT) USE OF ASPIRIN: ICD-10-CM

## 2021-03-28 DIAGNOSIS — Z86.16 PERSONAL HISTORY OF COVID-19: ICD-10-CM

## 2021-03-28 DIAGNOSIS — E78.5 HYPERLIPIDEMIA, UNSPECIFIED: ICD-10-CM

## 2021-03-28 DIAGNOSIS — I50.22 CHRONIC SYSTOLIC (CONGESTIVE) HEART FAILURE: ICD-10-CM

## 2021-03-28 DIAGNOSIS — E11.40 TYPE 2 DIABETES MELLITUS WITH DIABETIC NEUROPATHY, UNSPECIFIED: ICD-10-CM

## 2021-03-28 NOTE — CHART NOTE - NSCHARTNOTEFT_GEN_A_CORE
Clarification Note:    Please refer to my physical finding that is c/w chronic severe malnutrition in progress note dated 3/15 - sunken orbits, prominent clavicles, loss of subcutaneous fat pad triceps and muscle atrophy UE and LE along with hx of profound weight loss of over 30 lbs over 6-8 mos.  Patient was discharged prior to additional nutrition f/up requested.      This diagnosis is confirmed based on my clinical opinion, but not limited to my examination of the patient,     evaluation of the diagnostic  data, and my clinical decision making based on the clinical indicators    as above. Clarification Note:    Please refer to my physical finding that is c/w chronic severe malnutrition in progress note dated 3/15 - sunken orbits, prominent clavicles, loss of subcutaneous fat pad triceps and muscle atrophy UE and LE along with hx of profound weight loss of over 30 lbs over 6-8 mos.  Patient was discharged prior to additional nutrition f/up requested.      This diagnosis is confirmed based on my clinical opinion, but not limited to my examination of the patient,     evaluation of the diagnostic  data, and my clinical decision making based on the clinical indicators    as above.    The condition of chronic severe malnutrition was present at time of admission.

## 2021-03-29 LAB — CRP SERPL-MCNC: 5 MG/L

## 2021-04-01 NOTE — PHYSICAL EXAM
[Normal] : well developed, well nourished, in no acute distress [Normal Neck Lymph Nodes] : normal neck lymph nodes  [Normal Supraclavicular Lymph Nodes] : normal supraclavicular lymph nodes [de-identified] : Anicteric [de-identified] : S1,S2, regular rate and rhythm. No murmurs heard. [de-identified] : Clear throughout. No wheezes heard. [de-identified] : Soft, non tender, small scabbed C tube area to RUQ [de-identified] : warm, dry [de-identified] : appears anxious

## 2021-04-01 NOTE — RESULTS/DATA
[FreeTextEntry1] : Diagnostic Studies\par \par Date: 2/5/21\par Study: CT AP W ([facility])\par Results: IMPRESSION: 3 cm rim-enhancing subcutaneous collection in the right upper quadrant likely connected to a contracted gallbladder at the site of a prior percutaneous cholecystostomy.\par \par Date: 9/23/2020\par Study: CT AP W ([facility])\par Results: IMPRESSION:\par 1.  No evidence of intraperitoneal abscess.\par 2.  Interval decrease in extent of trace fluid in the cholecystectomy bed and postprocedural change along the percutaneous drainage site.\par 3.  Persistent, however slightly improved focal cortical hypoenhancement in the left posterior kidney, may reflect improving infectious/inflammatory process.\par 4.  Anasarca. New bilateral pleural effusion, right greater than the left.\par \par \par Date: 9/22/2020\par Study: US Abdomen ([facility])\par Results:Impression:\par 1. Cholelithiasis. Limited evaluation due to contracted gallbladder. No pericholecystic fluid.\par 2. No significant change in mild CBD dilatation.\par 3. Borderline hepatomegaly.\par 4. Incidentally seen right pleural effusion.\par \par \par Date: 2/27/2020\par Study: HIDA Scan ([facility])\par Results:IMPRESSION:  \par NO DEFINITE VISUALIZATION OF THE GALLBLADDER THROUGH 4 HOURS POST-INJECTION, CONSISTENT WITH CHOLECYSTITIS, AS DESCRIBED ABOVE.  CLINICAL CORRELATION IS SUGGESTED.\par \par Labs: \par Date:2/5/21\par Results: WBC 9.39\par \par

## 2021-04-01 NOTE — ASSESSMENT
[FreeTextEntry1] : I) (1)  subcutaneous collection at site of c tube (2) chronic cholecystitis\par \par P) I discussed at length with patient he likely has a small subq collection secondary to the c tube tract which keeps opening and draining. This can certainly be opened and drained to remove as a potential source of inflammation and allow him to have knee surgery. I would not advise cholecystectomy at present as he is asymptomatic. He is high risk for any surgery and will need to speak w cardiologist. all questions answered.\par \par Margarito King MD\par \par Chief Surgical Oncology\par Multidisciplinary GI cancer program\par [facility] [place]\par [facility]\par \par Professor Surgery\par [facility] [employer] [employer]\par \par ADDENDUM: spoke w cardiologist (Dr Zheng) who feels anything other than local anasthesia w sedation will put him at high risk because of his poor EF. Will discuss w patient.\par

## 2021-04-01 NOTE — HISTORY OF PRESENT ILLNESS
[de-identified] : Patient Name: FLOWER MARISCAL \par MRN: [id number] \par Radha MRN: [id number]\par Referring Provider: none \par ID: Dr. Vaughan & Dr. Casillas\par Ortho: Dr. Jose Castro\par Date: 3/9/21\par \par Diagnosis: gangrenous cholecystitis\par \par 65 year male  presents for evaluation of cholecystitis s/p percutaneous cholecystostomy tube at [place] 2/2020 with removal in 5/2020 complicated by recurrent RUQ abscesses at the drain sites. He was seen by Dr. Deny Westbrook in June 2020 and plan was to proceed with a lap cholecystectomy after obtaining clearances. He developed COVID 19 and did not have surgery. He presented to the ED at [facility] on 2/5/2021 with recurrent discharge from his drain site. CT AP did show a contracted gallbladder and a 3 cm collection, dilated CBD. \par \par Of note, he has a PMHx of HTN, HLD, DM (on insulin pump), MIDCAB (2018), MI w/ stenting in 2018, CHF w/ AICD placement (2/11/20), TKA c/b multiple infections requiring spacers, gastroc flap, and eventual STSG with plastics on 9/18/20. He needs intervention for his knees but needs the gallbladder addressed first.\par \par Currently, Mr. MARISCAL has gas like abdominal pain and discomfort, admits to having decreased appetite, and admits to nausea and vomiting. He has changes in bowel habits now that he is on opiates for pain and has lost 60lbs in one year. He denies fevers, chills, or night sweats.\par \par Functional Status: Mr. MARISCAL is able to walk around with a walker without fatigue or dyspnea.\par

## 2021-04-06 ENCOUNTER — RX RENEWAL (OUTPATIENT)
Age: 66
End: 2021-04-06

## 2021-04-06 ENCOUNTER — OUTPATIENT (OUTPATIENT)
Dept: OUTPATIENT SERVICES | Facility: HOSPITAL | Age: 66
LOS: 1 days | Discharge: HOME | End: 2021-04-06

## 2021-04-06 DIAGNOSIS — Z98.890 OTHER SPECIFIED POSTPROCEDURAL STATES: Chronic | ICD-10-CM

## 2021-04-06 DIAGNOSIS — Z41.9 ENCOUNTER FOR PROCEDURE FOR PURPOSES OTHER THAN REMEDYING HEALTH STATE, UNSPECIFIED: Chronic | ICD-10-CM

## 2021-04-06 DIAGNOSIS — Z95.5 PRESENCE OF CORONARY ANGIOPLASTY IMPLANT AND GRAFT: Chronic | ICD-10-CM

## 2021-04-06 DIAGNOSIS — Z96.651 PRESENCE OF RIGHT ARTIFICIAL KNEE JOINT: Chronic | ICD-10-CM

## 2021-04-06 DIAGNOSIS — Z11.59 ENCOUNTER FOR SCREENING FOR OTHER VIRAL DISEASES: ICD-10-CM

## 2021-04-06 DIAGNOSIS — Z95.1 PRESENCE OF AORTOCORONARY BYPASS GRAFT: Chronic | ICD-10-CM

## 2021-04-06 LAB — VOLTAGE-GATED K CHANNEL AB RESULT: 25 PMOL/L — SIGNIFICANT CHANGE UP (ref 0–31)

## 2021-04-07 ENCOUNTER — TRANSCRIPTION ENCOUNTER (OUTPATIENT)
Age: 66
End: 2021-04-07

## 2021-04-07 VITALS
WEIGHT: 167.99 LBS | OXYGEN SATURATION: 100 % | TEMPERATURE: 97 F | HEART RATE: 60 BPM | RESPIRATION RATE: 18 BRPM | SYSTOLIC BLOOD PRESSURE: 118 MMHG | HEIGHT: 73 IN | DIASTOLIC BLOOD PRESSURE: 50 MMHG

## 2021-04-07 NOTE — PATIENT PROFILE ADULT - NSPROIMPLANTSMEDDEV_GEN_A_NUR
hardware in right knee, coronary stents, right hip/Automatic Implantable Cardioverter Defibrillator/Artificial joint/Insulin pump

## 2021-04-07 NOTE — PRE-OP CHECKLIST - INTERNAL PROSTHESES
TKR- right; THR right; 2 stents ; AICD/yes(specify) TKR- right; THR right; 2 stents ; AICD; screw left great toe/yes(specify)

## 2021-04-08 ENCOUNTER — RESULT REVIEW (OUTPATIENT)
Age: 66
End: 2021-04-08

## 2021-04-08 ENCOUNTER — INPATIENT (INPATIENT)
Facility: HOSPITAL | Age: 66
LOS: 0 days | Discharge: HOME | DRG: 902 | End: 2021-04-08
Attending: SURGERY | Admitting: SURGERY
Payer: COMMERCIAL

## 2021-04-08 ENCOUNTER — APPOINTMENT (OUTPATIENT)
Dept: SURGICAL ONCOLOGY | Facility: HOSPITAL | Age: 66
End: 2021-04-08

## 2021-04-08 VITALS
HEART RATE: 61 BPM | RESPIRATION RATE: 19 BRPM | DIASTOLIC BLOOD PRESSURE: 57 MMHG | TEMPERATURE: 96 F | SYSTOLIC BLOOD PRESSURE: 117 MMHG | OXYGEN SATURATION: 99 %

## 2021-04-08 DIAGNOSIS — Z41.9 ENCOUNTER FOR PROCEDURE FOR PURPOSES OTHER THAN REMEDYING HEALTH STATE, UNSPECIFIED: Chronic | ICD-10-CM

## 2021-04-08 DIAGNOSIS — Z95.810 PRESENCE OF AUTOMATIC (IMPLANTABLE) CARDIAC DEFIBRILLATOR: Chronic | ICD-10-CM

## 2021-04-08 DIAGNOSIS — Z98.890 OTHER SPECIFIED POSTPROCEDURAL STATES: Chronic | ICD-10-CM

## 2021-04-08 DIAGNOSIS — Z96.651 PRESENCE OF RIGHT ARTIFICIAL KNEE JOINT: Chronic | ICD-10-CM

## 2021-04-08 DIAGNOSIS — Z96.641 PRESENCE OF RIGHT ARTIFICIAL HIP JOINT: Chronic | ICD-10-CM

## 2021-04-08 DIAGNOSIS — Z95.5 PRESENCE OF CORONARY ANGIOPLASTY IMPLANT AND GRAFT: Chronic | ICD-10-CM

## 2021-04-08 DIAGNOSIS — Z90.89 ACQUIRED ABSENCE OF OTHER ORGANS: Chronic | ICD-10-CM

## 2021-04-08 DIAGNOSIS — Z95.1 PRESENCE OF AORTOCORONARY BYPASS GRAFT: Chronic | ICD-10-CM

## 2021-04-08 DIAGNOSIS — Z43.4 ENCOUNTER FOR ATTENTION TO OTHER ARTIFICIAL OPENINGS OF DIGESTIVE TRACT: Chronic | ICD-10-CM

## 2021-04-08 LAB
GLUCOSE BLDC GLUCOMTR-MCNC: 118 MG/DL — HIGH (ref 70–99)
GLUCOSE BLDC GLUCOMTR-MCNC: 151 MG/DL — HIGH (ref 70–99)
GLUCOSE BLDC GLUCOMTR-MCNC: 178 MG/DL — HIGH (ref 70–99)

## 2021-04-08 PROCEDURE — 82962 GLUCOSE BLOOD TEST: CPT

## 2021-04-08 PROCEDURE — 88304 TISSUE EXAM BY PATHOLOGIST: CPT | Mod: 26

## 2021-04-08 PROCEDURE — 88304 TISSUE EXAM BY PATHOLOGIST: CPT

## 2021-04-08 PROCEDURE — 10061 I&D ABSCESS COMP/MULTIPLE: CPT

## 2021-04-08 NOTE — BRIEF OPERATIVE NOTE - OPERATION/FINDINGS
Excision of cholecystocutaneous fistula and tract under local anesthesia and sedation. Placement of Penrose drain

## 2021-04-09 PROBLEM — F41.9 ANXIETY DISORDER, UNSPECIFIED: Chronic | Status: ACTIVE | Noted: 2021-04-07

## 2021-04-09 LAB
ABO + RH PNL BLD: NORMAL
ALBUMIN SERPL ELPH-MCNC: 3.8 G/DL
ALP BLD-CCNC: 122 U/L
ALT SERPL-CCNC: 25 U/L
ANION GAP SERPL CALC-SCNC: 14 MMOL/L
APTT BLD: 29.3 SEC
AST SERPL-CCNC: 22 U/L
BASOPHILS # BLD AUTO: 0.08 K/UL
BASOPHILS NFR BLD AUTO: 0.8 %
BILIRUB SERPL-MCNC: 0.7 MG/DL
BUN SERPL-MCNC: 26 MG/DL
CALCIUM SERPL-MCNC: 9.4 MG/DL
CHLORIDE SERPL-SCNC: 97 MMOL/L
CO2 SERPL-SCNC: 25 MMOL/L
CREAT SERPL-MCNC: 0.94 MG/DL
EOSINOPHIL # BLD AUTO: 0.32 K/UL
EOSINOPHIL NFR BLD AUTO: 3.2 %
GLUCOSE SERPL-MCNC: 215 MG/DL
HCT VFR BLD CALC: 31.1 %
HGB BLD-MCNC: 8.8 G/DL
IMM GRANULOCYTES NFR BLD AUTO: 0.4 %
INR PPP: 1.1 RATIO
LYMPHOCYTES # BLD AUTO: 1.17 K/UL
LYMPHOCYTES NFR BLD AUTO: 11.5 %
MAN DIFF?: NORMAL
MCHC RBC-ENTMCNC: 21.9 PG
MCHC RBC-ENTMCNC: 28.3 GM/DL
MCV RBC AUTO: 77.4 FL
MONOCYTES # BLD AUTO: 0.77 K/UL
MONOCYTES NFR BLD AUTO: 7.6 %
NEUTROPHILS # BLD AUTO: 7.75 K/UL
NEUTROPHILS NFR BLD AUTO: 76.5 %
PLATELET # BLD AUTO: 336 K/UL
POTASSIUM SERPL-SCNC: 5.1 MMOL/L
PROT SERPL-MCNC: 7.3 G/DL
PT BLD: 12.9 SEC
RBC # BLD: 4.02 M/UL
RBC # FLD: 20.5 %
SODIUM SERPL-SCNC: 135 MMOL/L
WBC # FLD AUTO: 10.13 K/UL

## 2021-04-13 ENCOUNTER — APPOINTMENT (OUTPATIENT)
Dept: ORTHOPEDIC SURGERY | Facility: CLINIC | Age: 66
End: 2021-04-13
Payer: COMMERCIAL

## 2021-04-13 ENCOUNTER — APPOINTMENT (OUTPATIENT)
Dept: SURGICAL ONCOLOGY | Facility: CLINIC | Age: 66
End: 2021-04-13
Payer: COMMERCIAL

## 2021-04-13 VITALS
HEIGHT: 73 IN | WEIGHT: 176 LBS | SYSTOLIC BLOOD PRESSURE: 113 MMHG | HEART RATE: 80 BPM | DIASTOLIC BLOOD PRESSURE: 64 MMHG | OXYGEN SATURATION: 97 % | BODY MASS INDEX: 23.33 KG/M2 | TEMPERATURE: 97.5 F

## 2021-04-13 DIAGNOSIS — Z96.651 AFTERCARE FOLLOWING JOINT REPLACEMENT SURGERY: ICD-10-CM

## 2021-04-13 DIAGNOSIS — Z47.1 AFTERCARE FOLLOWING JOINT REPLACEMENT SURGERY: ICD-10-CM

## 2021-04-13 LAB — SURGICAL PATHOLOGY STUDY: SIGNIFICANT CHANGE UP

## 2021-04-13 PROCEDURE — 20610 DRAIN/INJ JOINT/BURSA W/O US: CPT | Mod: LT

## 2021-04-13 PROCEDURE — 99024 POSTOP FOLLOW-UP VISIT: CPT

## 2021-04-13 PROCEDURE — 99072 ADDL SUPL MATRL&STAF TM PHE: CPT

## 2021-04-13 PROCEDURE — 99214 OFFICE O/P EST MOD 30 MIN: CPT | Mod: 25

## 2021-04-13 NOTE — DISCUSSION/SUMMARY
[de-identified] : 66y/o male with right knee static spacer implanted for chronic MRSA PJI, advanced left knee osteoarthritis; in the setting of recurrent spontaneous wound formation from severe peripheral vascular disease, CHF, DM\par - We had another long discussion about options from here. My impression is that he is very likely still infected in the right knee and even if not, his soft tissues will not adequately protect another definitive knee arthroplasty. I will not offer him anything other than a right knee fusion. He is still not willing to accept this. I told him that I will not offer him a left knee arthroplasty either. I encouraged him to seek other orthopaedic opinions.\par - Zilretta was applied to the left knee today\par - Encouraged ongoing ambulation as tolerated\par - Will d/w Dr. Mckeon regarding the wounds on the bilateral legs\par - Repeat serum labs

## 2021-04-13 NOTE — HISTORY OF PRESENT ILLNESS
[de-identified] : 66y/o male presenting for followup after R knee static antibiotic cement spacer to spacer exchange with medial gastrocnemius flap coverage, left tibia debridement, bilateral lower extremity split thickness skin grafting on 9/18/20. Can walk about a half block with the walker. Pain is controlled with medications. Reports that he has redeveloped bilateral leg wounds which have been enlarging. He is continuing to do local wound care at home with Silvadene and Santyl. He denies fevers, chills, and other systemic complaints. The right knee wound has been slowly jolanta. He is chiefly here for worsening left knee pain, requesting Zilretta. He also wants to discuss further surgery for both knees.\par \par He finally had the gallbladder lesion excised by Dr. King and reports that the recovery has been smooth. His COVID-19 converted to negative; he reports ongoing brain fog but no other symptoms. His anxiety and depression have improved somewhat. He was recently diagnosed with Celiac disease and admits to noncompliance with the diet.

## 2021-04-13 NOTE — PROCEDURE
[Injection] : Injection [Left] : of the left [Knee Joint] : knee joint [Osteoarthritis] : Osteoarthritis [Patient] : patient [Alcohol] : Alcohol [Betadine] : Betadine [Ethyl Chloride Spray] : ethyl chloride spray was used as a topical anesthetic [Lateral] : lateral [Anterior] : anterior [20] : a 20-gauge [Bandage Applied] : a bandage [Tolerated Well] : The patient tolerated the procedure well [None] : none [FreeTextEntry8] : Zilretta

## 2021-04-13 NOTE — PHYSICAL EXAM
[de-identified] : General appearance: cachectic appearing, pleasant, alert and oriented x 3, cooperative.  \par HEENT: normocephalic, EOM intact, wearing mask, external auditory canal clear.  \par Cardiovascular: bilateral 1+ pitting lower leg edema worse on the right, no varicosities, dorsalis pedis pulses not palpable, feet cool with delayed capillary refill.  \par Lymphatics: no palpable lymphadenopathy, no lymphedema.  \par Neurologic: sensation is normal, no muscle weakness in upper or lower extremities.  \par Dermatologic: skin moist, warm, no rash.  \par Spine: cervical spine with normal lordosis and painless range of motion, thoracic spine with normal kyphosis and painless range of motion, lumbosacral spine with normal lordosis and painless range of motion.\par Gait: ambulating with walker, right knee behaving as fused.\par \par Left knee:\par - Inspection: negative swelling, ecchymosis, and erythema.  \par - Wounds: central 2x4cm portion of the anterior leg wound appearing necrotic with a fibrinous bed; clear serous drainage, no purulence or surrounding fluctuance.\par - Alignment: mild noncorrectable varus.\par - Palpation: medial and lateral joint line tenderness on palpation.  \par - ROM active: , pain on extremes of motion\par - Ligamentous laxity: negative Lachman, negative ant. drawer test, negative post. drawer test, negative pivot shift test, stable to varus stress, stable to valgus stress.  \par - Muscle Test: 5/5 quad strength.  \par \par Right knee:\par - Inspection: moderate diffuse soft tissue swelling, no ecchymosis or erythema.  \par - Wounds: healed proximal and distal aspects of midline incision, nearly completely healed central portion over patella with STSG aside from 2x6cm area at most superior aspect with desquamation; no active drainage or purulence, no fluctuance. Multiple other partial-thickness ulcerations throughout anterior leg with the largest being approximately 4x6cm ovoid over the distal tibia, into subcutaneous depth, with surrounding erythema but no purulence. \par - Alignment: normal.  \par - Palpation: diffuse tenderness on palpation.  \par - ROM active: functionally fused at 30deg.

## 2021-04-20 ENCOUNTER — APPOINTMENT (OUTPATIENT)
Dept: SURGICAL ONCOLOGY | Facility: CLINIC | Age: 66
End: 2021-04-20
Payer: COMMERCIAL

## 2021-04-20 VITALS
BODY MASS INDEX: 23.33 KG/M2 | OXYGEN SATURATION: 98 % | WEIGHT: 176 LBS | TEMPERATURE: 97.3 F | SYSTOLIC BLOOD PRESSURE: 134 MMHG | DIASTOLIC BLOOD PRESSURE: 69 MMHG | HEART RATE: 91 BPM | HEIGHT: 73 IN

## 2021-04-20 PROCEDURE — 99024 POSTOP FOLLOW-UP VISIT: CPT

## 2021-04-20 NOTE — REASON FOR VISIT
[Spouse] : spouse [de-identified] : drainage of cholecystocutaneous fistula [de-identified] : 4/8/21 [de-identified] : 14

## 2021-04-20 NOTE — ASSESSMENT
[FreeTextEntry1] : I) normal postop\par \par P) can see us as needed and cleared to move forward with other procedures\par \par Margarito King MD\par \par Chief Surgical Oncology\par Multidisciplinary GI cancer program\par [facility] [place]\par [facility]\par \par Professor Surgery\par [employer] [facility] [employer]\par

## 2021-04-20 NOTE — HISTORY OF PRESENT ILLNESS
[FreeTextEntry1] : Patient Name: FLOWER MARISCAL \par MRN: [id number] \par [place] MRN: [id number] \par Referring Provider: n/a\par ID: Dr. Vaughan, Dr. Casillas\par Ortho: Dr. Jose Castro \par Date: 4/20/21\par \par Diagnosis: gangrenous cholecystitis\par Operative Date: 4/8/2021\par Procedure: I&D of cholecystocutaneous fistula\par \par He presents for a scheduled post operative visit. He is 12 days post op and feels well. He is changing his dressing 1-2x/day and this morning was unable to pack it because it was closed off.\par \par Currently, Mr. MARISCAL denies abdominal pain and discomfort. He denies fevers, chills, or night sweats. He is tolerating a regular diet and is having regular bowel movements. Pain is controlled.\par \par Final Pathology Showed: Fragments of fibrotic and adipose tissue with mild acute and chronic inflammation\par \par

## 2021-04-20 NOTE — PHYSICAL EXAM
[Normal] : well developed, well nourished, in no acute distress [de-identified] : Soft, non tender, right quadrant surgical site is healing well without any drainage

## 2021-04-23 DIAGNOSIS — K82.3 FISTULA OF GALLBLADDER: ICD-10-CM

## 2021-04-23 DIAGNOSIS — T81.83XA PERSISTENT POSTPROCEDURAL FISTULA, INITIAL ENCOUNTER: ICD-10-CM

## 2021-04-23 DIAGNOSIS — M17.0 BILATERAL PRIMARY OSTEOARTHRITIS OF KNEE: ICD-10-CM

## 2021-04-23 DIAGNOSIS — Z96.41 PRESENCE OF INSULIN PUMP (EXTERNAL) (INTERNAL): ICD-10-CM

## 2021-04-23 DIAGNOSIS — I50.9 HEART FAILURE, UNSPECIFIED: ICD-10-CM

## 2021-04-23 DIAGNOSIS — I11.0 HYPERTENSIVE HEART DISEASE WITH HEART FAILURE: ICD-10-CM

## 2021-04-23 DIAGNOSIS — E11.9 TYPE 2 DIABETES MELLITUS WITHOUT COMPLICATIONS: ICD-10-CM

## 2021-04-23 DIAGNOSIS — K90.0 CELIAC DISEASE: ICD-10-CM

## 2021-04-23 DIAGNOSIS — I25.2 OLD MYOCARDIAL INFARCTION: ICD-10-CM

## 2021-04-23 DIAGNOSIS — Z79.4 LONG TERM (CURRENT) USE OF INSULIN: ICD-10-CM
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
## 2021-04-23 DIAGNOSIS — Z95.5 PRESENCE OF CORONARY ANGIOPLASTY IMPLANT AND GRAFT: ICD-10-CM

## 2021-04-23 DIAGNOSIS — Z96.651 PRESENCE OF RIGHT ARTIFICIAL KNEE JOINT: ICD-10-CM

## 2021-04-23 DIAGNOSIS — Z86.16 PERSONAL HISTORY OF COVID-19: ICD-10-CM

## 2021-04-23 DIAGNOSIS — Z96.641 PRESENCE OF RIGHT ARTIFICIAL HIP JOINT: ICD-10-CM

## 2021-04-23 DIAGNOSIS — E78.5 HYPERLIPIDEMIA, UNSPECIFIED: ICD-10-CM

## 2021-04-23 DIAGNOSIS — Z79.82 LONG TERM (CURRENT) USE OF ASPIRIN: ICD-10-CM

## 2021-04-23 DIAGNOSIS — Z95.810 PRESENCE OF AUTOMATIC (IMPLANTABLE) CARDIAC DEFIBRILLATOR: ICD-10-CM

## 2021-04-23 DIAGNOSIS — D64.9 ANEMIA, UNSPECIFIED: ICD-10-CM

## 2021-04-25 ENCOUNTER — INPATIENT (INPATIENT)
Facility: HOSPITAL | Age: 66
LOS: 50 days | Discharge: NURSING, REHAB OR LONG-TERM CARE | End: 2021-06-15
Attending: HOSPITALIST | Admitting: HOSPITALIST
Payer: COMMERCIAL

## 2021-04-25 VITALS
HEIGHT: 73 IN | HEART RATE: 89 BPM | OXYGEN SATURATION: 98 % | RESPIRATION RATE: 20 BRPM | DIASTOLIC BLOOD PRESSURE: 57 MMHG | SYSTOLIC BLOOD PRESSURE: 124 MMHG | TEMPERATURE: 98 F

## 2021-04-25 DIAGNOSIS — Z96.651 PRESENCE OF RIGHT ARTIFICIAL KNEE JOINT: Chronic | ICD-10-CM
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
## 2021-04-25 DIAGNOSIS — Z43.4 ENCOUNTER FOR ATTENTION TO OTHER ARTIFICIAL OPENINGS OF DIGESTIVE TRACT: Chronic | ICD-10-CM

## 2021-04-25 DIAGNOSIS — I50.22 CHRONIC SYSTOLIC (CONGESTIVE) HEART FAILURE: ICD-10-CM

## 2021-04-25 DIAGNOSIS — Z90.89 ACQUIRED ABSENCE OF OTHER ORGANS: Chronic | ICD-10-CM

## 2021-04-25 DIAGNOSIS — I13.0 HYPERTENSIVE HEART AND CHRONIC KIDNEY DISEASE WITH HEART FAILURE AND STAGE 1 THROUGH STAGE 4 CHRONIC KIDNEY DISEASE, OR UNSPECIFIED CHRONIC KIDNEY DISEASE: ICD-10-CM

## 2021-04-25 DIAGNOSIS — Z95.1 PRESENCE OF AORTOCORONARY BYPASS GRAFT: Chronic | ICD-10-CM

## 2021-04-25 DIAGNOSIS — F11.20 OPIOID DEPENDENCE, UNCOMPLICATED: ICD-10-CM

## 2021-04-25 DIAGNOSIS — Z95.5 PRESENCE OF CORONARY ANGIOPLASTY IMPLANT AND GRAFT: Chronic | ICD-10-CM

## 2021-04-25 DIAGNOSIS — Z86.718 PERSONAL HISTORY OF OTHER VENOUS THROMBOSIS AND EMBOLISM: ICD-10-CM

## 2021-04-25 DIAGNOSIS — L97.519 NON-PRESSURE CHRONIC ULCER OF OTHER PART OF RIGHT FOOT WITH UNSPECIFIED SEVERITY: ICD-10-CM

## 2021-04-25 DIAGNOSIS — E11.22 TYPE 2 DIABETES MELLITUS WITH DIABETIC CHRONIC KIDNEY DISEASE: ICD-10-CM

## 2021-04-25 DIAGNOSIS — T85.614A BREAKDOWN (MECHANICAL) OF INSULIN PUMP, INITIAL ENCOUNTER: ICD-10-CM

## 2021-04-25 DIAGNOSIS — F41.9 ANXIETY DISORDER, UNSPECIFIED: ICD-10-CM

## 2021-04-25 DIAGNOSIS — G89.29 OTHER CHRONIC PAIN: ICD-10-CM

## 2021-04-25 DIAGNOSIS — L97.529 NON-PRESSURE CHRONIC ULCER OF OTHER PART OF LEFT FOOT WITH UNSPECIFIED SEVERITY: ICD-10-CM

## 2021-04-25 DIAGNOSIS — K82.3 FISTULA OF GALLBLADDER: ICD-10-CM

## 2021-04-25 DIAGNOSIS — E11.40 TYPE 2 DIABETES MELLITUS WITH DIABETIC NEUROPATHY, UNSPECIFIED: ICD-10-CM

## 2021-04-25 DIAGNOSIS — Z96.641 PRESENCE OF RIGHT ARTIFICIAL HIP JOINT: Chronic | ICD-10-CM

## 2021-04-25 DIAGNOSIS — E11.621 TYPE 2 DIABETES MELLITUS WITH FOOT ULCER: ICD-10-CM
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
## 2021-04-25 DIAGNOSIS — M25.062 HEMARTHROSIS, LEFT KNEE: ICD-10-CM

## 2021-04-25 DIAGNOSIS — D64.9 ANEMIA, UNSPECIFIED: ICD-10-CM

## 2021-04-25 DIAGNOSIS — K90.0 CELIAC DISEASE: ICD-10-CM

## 2021-04-25 DIAGNOSIS — M00.062 STAPHYLOCOCCAL ARTHRITIS, LEFT KNEE: ICD-10-CM

## 2021-04-25 DIAGNOSIS — Z95.810 PRESENCE OF AUTOMATIC (IMPLANTABLE) CARDIAC DEFIBRILLATOR: Chronic | ICD-10-CM

## 2021-04-25 DIAGNOSIS — S81.801A UNSPECIFIED OPEN WOUND, RIGHT LOWER LEG, INITIAL ENCOUNTER: ICD-10-CM

## 2021-04-25 DIAGNOSIS — F32.9 MAJOR DEPRESSIVE DISORDER, SINGLE EPISODE, UNSPECIFIED: ICD-10-CM

## 2021-04-25 DIAGNOSIS — R64 CACHEXIA: ICD-10-CM

## 2021-04-25 DIAGNOSIS — J44.9 CHRONIC OBSTRUCTIVE PULMONARY DISEASE, UNSPECIFIED: ICD-10-CM

## 2021-04-25 DIAGNOSIS — D72.829 ELEVATED WHITE BLOOD CELL COUNT, UNSPECIFIED: ICD-10-CM

## 2021-04-25 DIAGNOSIS — I25.10 ATHEROSCLEROTIC HEART DISEASE OF NATIVE CORONARY ARTERY WITHOUT ANGINA PECTORIS: ICD-10-CM

## 2021-04-25 DIAGNOSIS — Z98.890 OTHER SPECIFIED POSTPROCEDURAL STATES: Chronic | ICD-10-CM

## 2021-04-25 DIAGNOSIS — Z79.4 LONG TERM (CURRENT) USE OF INSULIN: ICD-10-CM

## 2021-04-25 DIAGNOSIS — B95.61 METHICILLIN SUSCEPTIBLE STAPHYLOCOCCUS AUREUS INFECTION AS THE CAUSE OF DISEASES CLASSIFIED ELSEWHERE: ICD-10-CM
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
## 2021-04-25 DIAGNOSIS — I70.0 ATHEROSCLEROSIS OF AORTA: ICD-10-CM

## 2021-04-25 DIAGNOSIS — Z41.9 ENCOUNTER FOR PROCEDURE FOR PURPOSES OTHER THAN REMEDYING HEALTH STATE, UNSPECIFIED: Chronic | ICD-10-CM

## 2021-04-25 DIAGNOSIS — E43 UNSPECIFIED SEVERE PROTEIN-CALORIE MALNUTRITION: ICD-10-CM

## 2021-04-25 DIAGNOSIS — Z95.1 PRESENCE OF AORTOCORONARY BYPASS GRAFT: ICD-10-CM

## 2021-04-25 DIAGNOSIS — E11.65 TYPE 2 DIABETES MELLITUS WITH HYPERGLYCEMIA: ICD-10-CM

## 2021-04-25 DIAGNOSIS — E11.319 TYPE 2 DIABETES MELLITUS WITH UNSPECIFIED DIABETIC RETINOPATHY WITHOUT MACULAR EDEMA: ICD-10-CM

## 2021-04-25 LAB
ALBUMIN SERPL ELPH-MCNC: 4.4 G/DL — SIGNIFICANT CHANGE UP (ref 3.5–5.2)
ALP SERPL-CCNC: 127 U/L — HIGH (ref 30–115)
ALT FLD-CCNC: 30 U/L — SIGNIFICANT CHANGE UP (ref 0–41)
ANION GAP SERPL CALC-SCNC: 11 MMOL/L — SIGNIFICANT CHANGE UP (ref 7–14)
APTT BLD: 27.2 SEC — SIGNIFICANT CHANGE UP (ref 27–39.2)
AST SERPL-CCNC: 26 U/L — SIGNIFICANT CHANGE UP (ref 0–41)
BASOPHILS # BLD AUTO: 0.05 K/UL — SIGNIFICANT CHANGE UP (ref 0–0.2)
BASOPHILS NFR BLD AUTO: 0.3 % — SIGNIFICANT CHANGE UP (ref 0–1)
BILIRUB SERPL-MCNC: 1.1 MG/DL — SIGNIFICANT CHANGE UP (ref 0.2–1.2)
BUN SERPL-MCNC: 22 MG/DL — HIGH (ref 10–20)
CALCIUM SERPL-MCNC: 9.3 MG/DL — SIGNIFICANT CHANGE UP (ref 8.5–10.1)
CHLORIDE SERPL-SCNC: 100 MMOL/L — SIGNIFICANT CHANGE UP (ref 98–110)
CO2 SERPL-SCNC: 24 MMOL/L — SIGNIFICANT CHANGE UP (ref 17–32)
CREAT SERPL-MCNC: 1 MG/DL — SIGNIFICANT CHANGE UP (ref 0.7–1.5)
EOSINOPHIL # BLD AUTO: 0.04 K/UL — SIGNIFICANT CHANGE UP (ref 0–0.7)
EOSINOPHIL NFR BLD AUTO: 0.2 % — SIGNIFICANT CHANGE UP (ref 0–8)
GLUCOSE SERPL-MCNC: 249 MG/DL — HIGH (ref 70–99)
HCT VFR BLD CALC: 32.3 % — LOW (ref 42–52)
HGB BLD-MCNC: 9.8 G/DL — LOW (ref 14–18)
IMM GRANULOCYTES NFR BLD AUTO: 0.7 % — HIGH (ref 0.1–0.3)
INR BLD: 1.27 RATIO — SIGNIFICANT CHANGE UP (ref 0.65–1.3)
LACTATE SERPL-SCNC: 1.4 MMOL/L — SIGNIFICANT CHANGE UP (ref 0.7–2)
LYMPHOCYTES # BLD AUTO: 0.56 K/UL — LOW (ref 1.2–3.4)
LYMPHOCYTES # BLD AUTO: 3.3 % — LOW (ref 20.5–51.1)
MCHC RBC-ENTMCNC: 21.7 PG — LOW (ref 27–31)
MCHC RBC-ENTMCNC: 30.3 G/DL — LOW (ref 32–37)
MCV RBC AUTO: 71.5 FL — LOW (ref 80–94)
MONOCYTES # BLD AUTO: 1.7 K/UL — HIGH (ref 0.1–0.6)
MONOCYTES NFR BLD AUTO: 10.1 % — HIGH (ref 1.7–9.3)
NEUTROPHILS # BLD AUTO: 14.38 K/UL — HIGH (ref 1.4–6.5)
NEUTROPHILS NFR BLD AUTO: 85.4 % — HIGH (ref 42.2–75.2)
NRBC # BLD: 0 /100 WBCS — SIGNIFICANT CHANGE UP (ref 0–0)
PLATELET # BLD AUTO: 295 K/UL — SIGNIFICANT CHANGE UP (ref 130–400)
POTASSIUM SERPL-MCNC: 4.5 MMOL/L — SIGNIFICANT CHANGE UP (ref 3.5–5)
POTASSIUM SERPL-SCNC: 4.5 MMOL/L — SIGNIFICANT CHANGE UP (ref 3.5–5)
PROT SERPL-MCNC: 8.2 G/DL — HIGH (ref 6–8)
PROTHROM AB SERPL-ACNC: 14.6 SEC — HIGH (ref 9.95–12.87)
RBC # BLD: 4.52 M/UL — LOW (ref 4.7–6.1)
RBC # FLD: 18.8 % — HIGH (ref 11.5–14.5)
SODIUM SERPL-SCNC: 135 MMOL/L — SIGNIFICANT CHANGE UP (ref 135–146)
WBC # BLD: 16.84 K/UL — HIGH (ref 4.8–10.8)
WBC # FLD AUTO: 16.84 K/UL — HIGH (ref 4.8–10.8)

## 2021-04-25 PROCEDURE — 93970 EXTREMITY STUDY: CPT | Mod: 26

## 2021-04-25 PROCEDURE — 99285 EMERGENCY DEPT VISIT HI MDM: CPT

## 2021-04-25 RX ORDER — SODIUM CHLORIDE 9 MG/ML
1000 INJECTION, SOLUTION INTRAVENOUS ONCE
Refills: 0 | Status: COMPLETED | OUTPATIENT
Start: 2021-04-25 | End: 2021-04-25

## 2021-04-25 RX ADMIN — SODIUM CHLORIDE 1000 MILLILITER(S): 9 INJECTION, SOLUTION INTRAVENOUS at 21:58

## 2021-04-25 NOTE — ED ADULT TRIAGE NOTE - CHIEF COMPLAINT QUOTE
Pt with c/o chronic leg and Knee pain/ took diluadid 2mg at 17:30pm / 15mg morphine at 6pm at home and was given 10mg IV my EMS

## 2021-04-26 LAB
APPEARANCE UR: CLEAR — SIGNIFICANT CHANGE UP
B PERT IGG+IGM PNL SER: ABNORMAL
BACTERIA # UR AUTO: NEGATIVE — SIGNIFICANT CHANGE UP
BILIRUB UR-MCNC: NEGATIVE — SIGNIFICANT CHANGE UP
COLOR FLD: YELLOW — SIGNIFICANT CHANGE UP
COLOR SPEC: SIGNIFICANT CHANGE UP
CRP SERPL-MCNC: 108 MG/L — HIGH
DIFF PNL FLD: NEGATIVE — SIGNIFICANT CHANGE UP
EPI CELLS # UR: 0 /HPF — SIGNIFICANT CHANGE UP (ref 0–5)
FLUID INTAKE SUBSTANCE CLASS: SIGNIFICANT CHANGE UP
FLUID SEGMENTED GRANULOCYTES: 90 % — SIGNIFICANT CHANGE UP
FOLATE+VIT B12 SERBLD-IMP: 0 % — SIGNIFICANT CHANGE UP
GLUCOSE BLDC GLUCOMTR-MCNC: 220 MG/DL — HIGH (ref 70–99)
GLUCOSE BLDC GLUCOMTR-MCNC: 265 MG/DL — HIGH (ref 70–99)
GLUCOSE UR QL: ABNORMAL
GRAM STN FLD: SIGNIFICANT CHANGE UP
HYALINE CASTS # UR AUTO: 0 /LPF — SIGNIFICANT CHANGE UP (ref 0–7)
KETONES UR-MCNC: NEGATIVE — SIGNIFICANT CHANGE UP
LEUKOCYTE ESTERASE UR-ACNC: NEGATIVE — SIGNIFICANT CHANGE UP
LYMPHOCYTES # FLD: 0 — SIGNIFICANT CHANGE UP
MONOS+MACROS # FLD: 10 % — SIGNIFICANT CHANGE UP
NITRITE UR-MCNC: NEGATIVE — SIGNIFICANT CHANGE UP
PH UR: 6 — SIGNIFICANT CHANGE UP (ref 5–8)
PROT UR-MCNC: ABNORMAL
RBC CASTS # UR COMP ASSIST: 2 /HPF — SIGNIFICANT CHANGE UP (ref 0–4)
RCV VOL RI: 8000 /UL — HIGH (ref 0–0)
SARS-COV-2 RNA SPEC QL NAA+PROBE: SIGNIFICANT CHANGE UP
SP GR SPEC: 1.03 — SIGNIFICANT CHANGE UP (ref 1.01–1.03)
SPECIMEN SOURCE: SIGNIFICANT CHANGE UP
SYNOVIAL CRYSTALS CLARITY: ABNORMAL
SYNOVIAL CRYSTALS COLOR: YELLOW
SYNOVIAL CRYSTALS ID: ABNORMAL
SYNOVIAL CRYSTALS TUBE: SIGNIFICANT CHANGE UP
TOTAL NUCLEATED CELL COUNT, BODY FLUID: SIGNIFICANT CHANGE UP /UL
TUBE TYPE: SIGNIFICANT CHANGE UP
UROBILINOGEN FLD QL: SIGNIFICANT CHANGE UP
WBC UR QL: 1 /HPF — SIGNIFICANT CHANGE UP (ref 0–5)

## 2021-04-26 PROCEDURE — 74177 CT ABD & PELVIS W/CONTRAST: CPT | Mod: 26,MA

## 2021-04-26 PROCEDURE — 71045 X-RAY EXAM CHEST 1 VIEW: CPT | Mod: 26

## 2021-04-26 PROCEDURE — 99233 SBSQ HOSP IP/OBS HIGH 50: CPT

## 2021-04-26 PROCEDURE — 73562 X-RAY EXAM OF KNEE 3: CPT | Mod: 26,50

## 2021-04-26 RX ORDER — PRASUGREL 5 MG/1
10 TABLET, FILM COATED ORAL DAILY
Refills: 0 | Status: DISCONTINUED | OUTPATIENT
Start: 2021-04-26 | End: 2021-05-27

## 2021-04-26 RX ORDER — MORPHINE SULFATE 50 MG/1
4 CAPSULE, EXTENDED RELEASE ORAL EVERY 4 HOURS
Refills: 0 | Status: DISCONTINUED | OUTPATIENT
Start: 2021-04-26 | End: 2021-05-03

## 2021-04-26 RX ORDER — SODIUM CHLORIDE 9 MG/ML
1000 INJECTION, SOLUTION INTRAVENOUS
Refills: 0 | Status: DISCONTINUED | OUTPATIENT
Start: 2021-04-26 | End: 2021-04-26

## 2021-04-26 RX ORDER — LANOLIN ALCOHOL/MO/W.PET/CERES
10 CREAM (GRAM) TOPICAL AT BEDTIME
Refills: 0 | Status: DISCONTINUED | OUTPATIENT
Start: 2021-04-26 | End: 2021-05-27

## 2021-04-26 RX ORDER — MORPHINE SULFATE 50 MG/1
2 CAPSULE, EXTENDED RELEASE ORAL
Refills: 0 | Status: DISCONTINUED | OUTPATIENT
Start: 2021-04-26 | End: 2021-04-26

## 2021-04-26 RX ORDER — ACETAMINOPHEN 500 MG
650 TABLET ORAL EVERY 6 HOURS
Refills: 0 | Status: DISCONTINUED | OUTPATIENT
Start: 2021-04-26 | End: 2021-05-27

## 2021-04-26 RX ORDER — CEFTRIAXONE 500 MG/1
1000 INJECTION, POWDER, FOR SOLUTION INTRAMUSCULAR; INTRAVENOUS ONCE
Refills: 0 | Status: COMPLETED | OUTPATIENT
Start: 2021-04-26 | End: 2021-04-26

## 2021-04-26 RX ORDER — PANTOPRAZOLE SODIUM 20 MG/1
40 TABLET, DELAYED RELEASE ORAL
Refills: 0 | Status: DISCONTINUED | OUTPATIENT
Start: 2021-04-26 | End: 2021-05-27

## 2021-04-26 RX ORDER — DIGOXIN 250 MCG
125 TABLET ORAL DAILY
Refills: 0 | Status: DISCONTINUED | OUTPATIENT
Start: 2021-04-26 | End: 2021-05-27

## 2021-04-26 RX ORDER — DULOXETINE HYDROCHLORIDE 30 MG/1
90 CAPSULE, DELAYED RELEASE ORAL DAILY
Refills: 0 | Status: DISCONTINUED | OUTPATIENT
Start: 2021-04-26 | End: 2021-05-27

## 2021-04-26 RX ORDER — CEFEPIME 1 G/1
2000 INJECTION, POWDER, FOR SOLUTION INTRAMUSCULAR; INTRAVENOUS EVERY 8 HOURS
Refills: 0 | Status: DISCONTINUED | OUTPATIENT
Start: 2021-04-26 | End: 2021-04-27

## 2021-04-26 RX ORDER — OXYCODONE AND ACETAMINOPHEN 5; 325 MG/1; MG/1
1 TABLET ORAL EVERY 6 HOURS
Refills: 0 | Status: DISCONTINUED | OUTPATIENT
Start: 2021-04-26 | End: 2021-05-03

## 2021-04-26 RX ORDER — ATORVASTATIN CALCIUM 80 MG/1
80 TABLET, FILM COATED ORAL AT BEDTIME
Refills: 0 | Status: DISCONTINUED | OUTPATIENT
Start: 2021-04-26 | End: 2021-05-27

## 2021-04-26 RX ORDER — SPIRONOLACTONE 25 MG/1
25 TABLET, FILM COATED ORAL DAILY
Refills: 0 | Status: DISCONTINUED | OUTPATIENT
Start: 2021-04-26 | End: 2021-05-27

## 2021-04-26 RX ORDER — METOPROLOL TARTRATE 50 MG
25 TABLET ORAL DAILY
Refills: 0 | Status: DISCONTINUED | OUTPATIENT
Start: 2021-04-26 | End: 2021-05-27

## 2021-04-26 RX ORDER — ALPRAZOLAM 0.25 MG
0.5 TABLET ORAL EVERY 8 HOURS
Refills: 0 | Status: DISCONTINUED | OUTPATIENT
Start: 2021-04-26 | End: 2021-05-02

## 2021-04-26 RX ORDER — INSULIN ASPART 100 [IU]/ML
1 INJECTION, SOLUTION SUBCUTANEOUS
Refills: 0 | Status: DISCONTINUED | OUTPATIENT
Start: 2021-04-26 | End: 2021-04-29

## 2021-04-26 RX ORDER — ASPIRIN/CALCIUM CARB/MAGNESIUM 324 MG
81 TABLET ORAL DAILY
Refills: 0 | Status: DISCONTINUED | OUTPATIENT
Start: 2021-04-26 | End: 2021-05-27

## 2021-04-26 RX ORDER — CEFAZOLIN SODIUM 1 G
2000 VIAL (EA) INJECTION EVERY 8 HOURS
Refills: 0 | Status: DISCONTINUED | OUTPATIENT
Start: 2021-04-26 | End: 2021-04-26

## 2021-04-26 RX ORDER — FUROSEMIDE 40 MG
40 TABLET ORAL DAILY
Refills: 0 | Status: DISCONTINUED | OUTPATIENT
Start: 2021-04-26 | End: 2021-05-27

## 2021-04-26 RX ORDER — HYDROMORPHONE HYDROCHLORIDE 2 MG/ML
1 INJECTION INTRAMUSCULAR; INTRAVENOUS; SUBCUTANEOUS ONCE
Refills: 0 | Status: DISCONTINUED | OUTPATIENT
Start: 2021-04-26 | End: 2021-04-26

## 2021-04-26 RX ORDER — ONDANSETRON 8 MG/1
4 TABLET, FILM COATED ORAL ONCE
Refills: 0 | Status: DISCONTINUED | OUTPATIENT
Start: 2021-04-26 | End: 2021-04-26

## 2021-04-26 RX ORDER — CHLORHEXIDINE GLUCONATE 213 G/1000ML
1 SOLUTION TOPICAL
Refills: 0 | Status: DISCONTINUED | OUTPATIENT
Start: 2021-04-26 | End: 2021-05-27

## 2021-04-26 RX ORDER — ENOXAPARIN SODIUM 100 MG/ML
40 INJECTION SUBCUTANEOUS DAILY
Refills: 0 | Status: DISCONTINUED | OUTPATIENT
Start: 2021-04-26 | End: 2021-04-30

## 2021-04-26 RX ORDER — DAPTOMYCIN 500 MG/10ML
640 INJECTION, POWDER, LYOPHILIZED, FOR SOLUTION INTRAVENOUS EVERY 24 HOURS
Refills: 0 | Status: DISCONTINUED | OUTPATIENT
Start: 2021-04-26 | End: 2021-04-27

## 2021-04-26 RX ORDER — OXYCODONE AND ACETAMINOPHEN 5; 325 MG/1; MG/1
1 TABLET ORAL ONCE
Refills: 0 | Status: DISCONTINUED | OUTPATIENT
Start: 2021-04-26 | End: 2021-04-26

## 2021-04-26 RX ORDER — HYDROMORPHONE HYDROCHLORIDE 2 MG/ML
0.5 INJECTION INTRAMUSCULAR; INTRAVENOUS; SUBCUTANEOUS ONCE
Refills: 0 | Status: DISCONTINUED | OUTPATIENT
Start: 2021-04-26 | End: 2021-04-26

## 2021-04-26 RX ORDER — CEFEPIME 1 G/1
INJECTION, POWDER, FOR SOLUTION INTRAMUSCULAR; INTRAVENOUS
Refills: 0 | Status: DISCONTINUED | OUTPATIENT
Start: 2021-04-26 | End: 2021-04-27

## 2021-04-26 RX ORDER — HYDROMORPHONE HYDROCHLORIDE 2 MG/ML
0.5 INJECTION INTRAMUSCULAR; INTRAVENOUS; SUBCUTANEOUS
Refills: 0 | Status: DISCONTINUED | OUTPATIENT
Start: 2021-04-26 | End: 2021-04-26

## 2021-04-26 RX ORDER — CEFEPIME 1 G/1
2000 INJECTION, POWDER, FOR SOLUTION INTRAMUSCULAR; INTRAVENOUS ONCE
Refills: 0 | Status: DISCONTINUED | OUTPATIENT
Start: 2021-04-26 | End: 2021-04-27

## 2021-04-26 RX ADMIN — Medication 650 MILLIGRAM(S): at 21:11

## 2021-04-26 RX ADMIN — DAPTOMYCIN 125.6 MILLIGRAM(S): 500 INJECTION, POWDER, LYOPHILIZED, FOR SOLUTION INTRAVENOUS at 23:16

## 2021-04-26 RX ADMIN — MORPHINE SULFATE 2 MILLIGRAM(S): 50 CAPSULE, EXTENDED RELEASE ORAL at 10:03

## 2021-04-26 RX ADMIN — HYDROMORPHONE HYDROCHLORIDE 0.5 MILLIGRAM(S): 2 INJECTION INTRAMUSCULAR; INTRAVENOUS; SUBCUTANEOUS at 06:49

## 2021-04-26 RX ADMIN — CEFEPIME 100 MILLIGRAM(S): 1 INJECTION, POWDER, FOR SOLUTION INTRAMUSCULAR; INTRAVENOUS at 21:11

## 2021-04-26 RX ADMIN — ENOXAPARIN SODIUM 40 MILLIGRAM(S): 100 INJECTION SUBCUTANEOUS at 13:17

## 2021-04-26 RX ADMIN — CEFTRIAXONE 100 MILLIGRAM(S): 500 INJECTION, POWDER, FOR SOLUTION INTRAMUSCULAR; INTRAVENOUS at 02:50

## 2021-04-26 RX ADMIN — Medication 650 MILLIGRAM(S): at 23:00

## 2021-04-26 RX ADMIN — MORPHINE SULFATE 2 MILLIGRAM(S): 50 CAPSULE, EXTENDED RELEASE ORAL at 12:17

## 2021-04-26 RX ADMIN — HYDROMORPHONE HYDROCHLORIDE 1 MILLIGRAM(S): 2 INJECTION INTRAMUSCULAR; INTRAVENOUS; SUBCUTANEOUS at 04:40

## 2021-04-26 RX ADMIN — Medication 100 MILLIGRAM(S): at 13:45

## 2021-04-26 RX ADMIN — MORPHINE SULFATE 4 MILLIGRAM(S): 50 CAPSULE, EXTENDED RELEASE ORAL at 20:21

## 2021-04-26 RX ADMIN — Medication 0.5 MILLIGRAM(S): at 21:11

## 2021-04-26 RX ADMIN — ATORVASTATIN CALCIUM 80 MILLIGRAM(S): 80 TABLET, FILM COATED ORAL at 21:11

## 2021-04-26 RX ADMIN — MORPHINE SULFATE 4 MILLIGRAM(S): 50 CAPSULE, EXTENDED RELEASE ORAL at 21:00

## 2021-04-26 NOTE — CONSULT NOTE ADULT - ASSESSMENT
This is a preliminary incomplete pended note, all final recommendations to follow after interview and examination of the patient.   This isASSESSMENT  65 year old male with PMH of CAD s/p MI, PCI/CABG, CHFrEF (15-20%), has ICD, HTN, celiac disease, DM, neuropathy, chronic b/l LE ulcers presents, severe chronic pain,  hx infected R TKR with MRSA (was eventually cemented so has limited ROM R knee), and history of cholecystostomy tube placement in February 2020 for acute cholecystitis s/p removal in May 2020 with multiple presentations including persistent bilious drainage from the prior tract site as well as a RUQ abdominal abscess at the site s/p drainage who presents with left knee pain    IMPRESSION  #Septic Arthritis of left knee  - s/p arthrocentesis of left knee - consistent with baterial septic infection   - s/p OR washout 4/26 -- purulent fluid in left knee with significant tricompartmental arthritis     #Biliary Drainage from previous perc sudhir site  - Previous Intraabdominal 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 Decreased area of right upper quadrant subcutaneous stranding at site of prior percutaneous cholecystostomy, without evidence of abscess.    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    #CHF s/p ICD  #DM   #Abx allergy: carvedilol (Other)  enalapril (Hives)  Entresto (Other)    Creatinine, Serum: 1.0 mg/dL (04.25.21 @ 22:15)    RECOMMENDATIONS  - follow-up OR cultures from 4/26  - Surgery following for Biliary drainage -- recommended for HIDA scan  - stop cefazolin -- change to daptomycin 8 mg/kg q 24 hours and cefepime 2g q 8 hours to cover previous VRE faecium and pseudomonas   - local wound care    Please call or message on Microsoft Teams if with any questions.  Spectra [phone]

## 2021-04-26 NOTE — PROGRESS NOTE ADULT - ASSESSMENT
ORTHOPEDIC SURGERY POC     65y Male s/p L knee arthroscopic I&D. Resting comfortably in PACU.     Denies numbness/tingling.  Denies f/c/n/v/cp/sob.    Medications:  acetaminophen   Tablet .. 650 milliGRAM(s) Oral every 6 hours PRN  ALPRAZolam 0.5 milliGRAM(s) Oral every 8 hours PRN  aspirin enteric coated 81 milliGRAM(s) Oral daily  atorvastatin 80 milliGRAM(s) Oral at bedtime  cefepime   IVPB      chlorhexidine 4% Liquid 1 Application(s) Topical <User Schedule>  DAPTOmycin IVPB 640 milliGRAM(s) IV Intermittent every 24 hours  digoxin     Tablet 125 MICROGram(s) Oral daily  DULoxetine 90 milliGRAM(s) Oral daily  enoxaparin Injectable 40 milliGRAM(s) SubCutaneous daily  furosemide    Tablet 40 milliGRAM(s) Oral daily PRN  HYDROmorphone  Injectable 0.5 milliGRAM(s) IV Push every 10 minutes PRN  insulin aspart (NovoLOG) Pump - Peds 1 Each SubCutaneous Continuous Pump  lactated ringers. 1000 milliLiter(s) IV Continuous <Continuous>  melatonin 10 milliGRAM(s) Oral at bedtime PRN  metoprolol succinate ER 25 milliGRAM(s) Oral daily  morphine  - Injectable 4 milliGRAM(s) IV Push every 4 hours PRN  morphine  - Injectable 2 milliGRAM(s) IV Push every 10 minutes PRN  ondansetron Injectable 4 milliGRAM(s) IV Push once PRN  oxycodone    5 mG/acetaminophen 325 mG 1 Tablet(s) Oral once PRN  oxycodone    5 mG/acetaminophen 325 mG 1 Tablet(s) Oral every 6 hours PRN  pantoprazole    Tablet 40 milliGRAM(s) Oral before breakfast  prasugrel 10 milliGRAM(s) Oral daily  spironolactone 25 milliGRAM(s) Oral daily    ACE inhibitors (Hives)  carvedilol (Other)  enalapril (Hives)  Entresto (Other)      PMH/PSH:  Status post percutaneous transluminal coronary angioplasty    History of surgery to heart and great vessels, presenting hazards to health    Atherosclerosis of coronary artery    Type 2 diabetes mellitus with neurological manifestations    Type 2 diabetes mellitus    HTN (hypertension)    Osteoarthritis    Chronic systolic CHF (congestive heart failure)    HLD (hyperlipidemia)    Hyperkalemia    COPD, moderate    CKD (chronic kidney disease) stage 2, GFR 60-89 ml/min    Blood clot due to device, implant, or graft    Diabetes    HTN (hypertension)    CHF (congestive heart failure)    History of celiac disease    MRSA bacteremia    Diverticulitis    STEMI (ST elevation myocardial infarction)    Diabetic neuropathy    Celiac disease    Anxiety and depression    Diabetic foot ulcer    Other postprocedural status    History of surgery to major organs, presenting hazards to health    Stented coronary artery    S/P CABG x 1    S/P TKR (total knee replacement), right    Surgery, elective    Surgery, elective    History of open reduction and internal fixation (ORIF) procedure    H/O shoulder surgery    AICD (automatic cardioverter/defibrillator) present    Cholecystostomy care    History of tonsillectomy    History of hip replacement, total, right        Exam:  T(C): 36.8 (04-26-21 @ 13:00), Max: 37.6 (04-26-21 @ 09:50)  HR: 98 (04-26-21 @ 16:00) (68 - 98)  BP: 123/60 (04-26-21 @ 16:00) (97/55 - 148/67)  RR: 17 (04-26-21 @ 16:00) (12 - 22)  SpO2: 100% (04-26-21 @ 16:00) (96% - 100%)  General: Awake, alert, NAD, AAOx3    LLE: Dressing c.d.i. Drain 1 50cc, Drain 2 scant sanguinous drainage.  SILT s/s/sp/dp/t.  Motor intact EHL, TA, Gastroc.  .  Palpable DP, PT pulses      Labs:                        9.8    16.84 )-----------( 295      ( 25 Apr 2021 22:15 )             32.3     04-25    135  |  100  |  22<H>  ----------------------------<  249<H>  4.5   |  24  |  1.0    Ca    9.3      25 Apr 2021 22:15    TPro  8.2<H>  /  Alb  4.4  /  TBili  1.1  /  DBili  x   /  AST  26  /  ALT  30  /  AlkPhos  127<H>  04-25    PT/INR - ( 25 Apr 2021 22:15 )   PT: 14.60 sec;   INR: 1.27 ratio         PTT - ( 25 Apr 2021 22:15 )  PTT:27.2 sec      A/P:  65yMale with s/p L knee arthroscopic I&D for septic arthritis    - WBAT LLE  - Daptomycin, cefepime per ID  - DVT PPX  - Pain control   - Regular diet  - Ortho to follow

## 2021-04-26 NOTE — ED PROVIDER NOTE - PHYSICAL EXAMINATION
PHYSICAL EXAM: I have reviewed current vital signs.  GENERAL: NAD, chronically ill appearing.  HEAD:  Normocephalic, atraumatic.  EYES: EOMI, PERRL, conjunctiva and sclera clear.  ENT: MMM.  NECK: Supple, FROM.  CHEST/LUNG: Clear to auscultation bilaterally; no wheezes, rales, or rhonchi.  HEART: Regular rate and rhythm, normal S1 and S2; no murmurs, rubs, or gallops.  ABDOMEN: Soft, mild diffuse TTP, no peritoneal signs, atraumatic.  EXTREMITIES:  2+ peripheral pulses. RLE with multiple chronic superficial skin wounds, +b/l small b/l knee effusions, TTP of b/l legs (diffusely), moderate ttp of L knee.   NEUROLOGY: A&O x 3. Motor 5/5. No focal neurological deficits.   SKIN: Warm and dry.

## 2021-04-26 NOTE — ED PROVIDER NOTE - NS ED ROS FT
Constitutional:  No fevers or chills.  Eyes:  No visual changes.  ENT:  No sore throat.  Neck:  No neck pain or stiffness.  Cardiac:  No CP.  Resp:  No cough or SOB. No hemoptysis.   GI:  No nausea, vomiting, diarrhea, or abdominal pain.  :  No dysuria, frequency, or hematuria.  MSK:  +knee pain.  Neuro:  No headache. +weakness.  Skin:  +chronic RLE skin wounds.

## 2021-04-26 NOTE — CONSULT NOTE ADULT - SUBJECTIVE AND OBJECTIVE BOX
HPI:  65 year old male with PMH of CAD s/p MI, PCI/CABG, CHFrEF (15-20%), has ICD, HTN, celiac disease, DM, neuropathy, chronic b/l LE ulcers presents, severe chronic pain,  hx infected R TKR with MRSA (was eventually cemented so has limited ROM R knee), and history of cholecystostomy tube placement in February 2020 for acute cholecystitis s/p removal in May 2020 with multiple presentations including persistent bilious drainage from the prior tract site as well as a RUQ abdominal abscess at the site s/p drainage. He presented to ED with inability to walk/ambulate 2/2 left knee pain. Pt has hx of knee pain and infections. 2 weeks ago pt received a 'steroid' shot for his left knee for pain. 2 days later, his knee began to swell and he was unable to walk or bend the knee. It progressively gotten worse to the point where the pain was unbearable so he presented to the ED.     Pt endorses a weight loss >70 lbs over the last year, there is bilious drainage coming from where he had a prior per sudhir, jaundice in the finger tips and sclera. Recent admission for uncontrollable movements, no diagnosis given. Pt denies f/c/n/v, chest pain, headaches, UTI symptoms     In the ED:  Joint was aspirated cloudy yellow fluid was seen and sent for cultures. Neutrophil count >50,000 and RBC >8000. Ortho consulted for septic arthritis, s/p arthroscopic drainage. Surg consulted for bile drainage, HIDA scan, consult GI, possible ERCP. CT A&P, No evidence of acute intra-abdominal pathology. Decreased area of right upper quadrant subcutaneous stranding at site of prior percutaneous cholecystostomy, without evidence of abscess. Vitally stable. Admitted to medicine for medical management.    The patient is being followed by Neurology, and he is followed by Cardiology Dr. Lopez in  and Dr. Lilia Caldera in [facility], also by Endo Dr. Cormier at [facility].     (26 Apr 2021 11:34)      PAST MEDICAL & SURGICAL HISTORY  Status post percutaneous transluminal coronary angioplasty  2 stents    Atherosclerosis of coronary artery  CAD (coronary artery disease)    Osteoarthritis    HLD (hyperlipidemia)    Blood clot due to device, implant, or graft  was on blood thinners    Diabetes  on insulin pump    HTN (hypertension)    CHF (congestive heart failure)  EF ~ 25%    History of celiac disease    Diverticulitis    STEMI (ST elevation myocardial infarction)    Diabetic neuropathy    Anxiety and depression    Other postprocedural status  Fixation hardware in foot LEFT    Stented coronary artery  10/18 heart attack  INFERIOR WALL MI    S/P CABG x 1  2018    S/P TKR (total knee replacement), right  with infection Mrsa   per pt he was cleared from MRSA infection    Surgery, elective  right knee wound debridement    History of open reduction and internal fixation (ORIF) procedure    H/O shoulder surgery  right    AICD (automatic cardioverter/defibrillator) present    Cholecystostomy care  drain inserted 2020 &amp; removed 4 months ago    History of tonsillectomy    History of hip replacement, total, right        FAMILY HISTORY:  FAMILY HISTORY:  Family history of heart disease (Mother)    Family history of allergies  daughter        SOCIAL HISTORY:  []smoker  []Alcohol  []Drug    ALLERGIES:  ACE inhibitors (Hives)  carvedilol (Other)  enalapril (Hives)  Entresto (Other)      MEDICATIONS:  MEDICATIONS  (STANDING):  aspirin enteric coated 81 milliGRAM(s) Oral daily  atorvastatin 80 milliGRAM(s) Oral at bedtime  ceFAZolin   IVPB 2000 milliGRAM(s) IV Intermittent every 8 hours  chlorhexidine 4% Liquid 1 Application(s) Topical <User Schedule>  digoxin     Tablet 125 MICROGram(s) Oral daily  DULoxetine 90 milliGRAM(s) Oral daily  enoxaparin Injectable 40 milliGRAM(s) SubCutaneous daily  insulin aspart (NovoLOG) Pump - Peds 1 Each SubCutaneous Continuous Pump  lactated ringers. 1000 milliLiter(s) (75 mL/Hr) IV Continuous <Continuous>  metoprolol succinate ER 25 milliGRAM(s) Oral daily  pantoprazole    Tablet 40 milliGRAM(s) Oral before breakfast  prasugrel 10 milliGRAM(s) Oral daily  spironolactone 25 milliGRAM(s) Oral daily    MEDICATIONS  (PRN):  acetaminophen   Tablet .. 650 milliGRAM(s) Oral every 6 hours PRN Temp greater or equal to 38C (100.4F), Mild Pain (1 - 3)  ALPRAZolam 0.5 milliGRAM(s) Oral every 8 hours PRN anxiety  HYDROmorphone  Injectable 0.5 milliGRAM(s) IV Push every 10 minutes PRN Severe Pain (7 - 10)  melatonin 10 milliGRAM(s) Oral at bedtime PRN Insomnia  morphine  - Injectable 4 milliGRAM(s) IV Push every 4 hours PRN Severe Pain (7 - 10)  morphine  - Injectable 2 milliGRAM(s) IV Push every 10 minutes PRN Moderate Pain (4 - 6)  ondansetron Injectable 4 milliGRAM(s) IV Push once PRN Nausea and/or Vomiting  oxycodone    5 mG/acetaminophen 325 mG 1 Tablet(s) Oral once PRN Mild Pain (1 - 3)  oxycodone    5 mG/acetaminophen 325 mG 1 Tablet(s) Oral every 6 hours PRN Moderate Pain (4 - 6)      HOME MEDICATIONS:  Home Medications:  ALPRAZolam 0.5 mg oral tablet: 1 tab(s) orally every 8 hours (26 Apr 2021 12:11)  aspirin 81 mg oral delayed release tablet: 1 tab(s) orally once a day (26 Apr 2021 12:11)  digoxin 125 mcg (0.125 mg) oral tablet: 1 tab(s) orally once a day (26 Apr 2021 12:11)  DULoxetine: 90 milligram(s) orally once a day (26 Apr 2021 12:11)  insulin: pump; HUMULIN (26 Apr 2021 12:11)  Jardiance 10 mg oral tablet: 1 tab(s) orally once a day (in the morning) (26 Apr 2021 12:11)  melatonin 10 mg oral tablet: 1 tab(s) orally once a day (at bedtime), As needed, Insomnia (26 Apr 2021 12:11)  metoprolol succinate 25 mg oral tablet, extended release: 1 tab(s) orally 2 times a day (26 Apr 2021 12:11)  morphine 15 mg oral tablet: 1 tab(s) orally every 4 hours, As Needed - for severe pain (7 to 10 out of 10) (26 Apr 2021 12:11)  prasugrel 10 mg oral tablet: 1 tab(s) orally once a day (26 Apr 2021 12:11)  spironolactone 25 mg oral tablet: 1 tab(s) orally once a day (26 Apr 2021 12:11)      VITALS:   T(F): 98.3 (04-26 @ 13:00), Max: 99.7 (04-26 @ 09:50)  HR: 72 (04-26 @ 13:00) (70 - 90)  BP: 109/57 (04-26 @ 13:00) (97/55 - 148/67)  BP(mean): --  RR: 12 (04-26 @ 13:00) (12 - 20)  SpO2: 97% (04-26 @ 13:00) (96% - 98%)    I&O's Summary      REVIEW OF SYSTEMS:  CONSTITUTIONAL: No weakness, fevers or chills  EYES: No visual changes  ENT: No vertigo or throat pain   NECK: No pain or stiffness  RESPIRATORY: No cough, wheezing, hemoptysis; No shortness of breath  CARDIOVASCULAR: No chest pain or palpitations  GASTROINTESTINAL: No abdominal or epigastric pain. No nausea, vomiting, or hematemesis; No diarrhea or constipation. No melena or hematochezia.  GENITOURINARY: No dysuria, frequency or hematuria  NEUROLOGICAL: No numbness or weakness  SKIN: No itching, no rashes  MSK: left knee pain    PHYSICAL EXAM:  NEURO: patient is awake , alert and oriented  GEN: Not in acute distress  NECK: no thyroid enlargement, no JVD  LUNGS: Clear to auscultation bilaterally   CARDIOVASCULAR: S1/S2 present, RRR , no murmurs or rubs, no carotid bruits,  + PP bilaterally  ABD: Soft, non-tender, non-distended, +BS  EXT: No FERNANDO    LABS:                        9.8    16.84 )-----------( 295      ( 25 Apr 2021 22:15 )             32.3     04-25    135  |  100  |  22<H>  ----------------------------<  249<H>  4.5   |  24  |  1.0    Ca    9.3      25 Apr 2021 22:15    TPro  8.2<H>  /  Alb  4.4  /  TBili  1.1  /  DBili  x   /  AST  26  /  ALT  30  /  AlkPhos  127<H>  04-25    PT/INR - ( 25 Apr 2021 22:15 )   PT: 14.60 sec;   INR: 1.27 ratio         PTT - ( 25 Apr 2021 22:15 )  PTT:27.2 sec  Lactate, Blood: 1.4 mmol/L (04-25-21 @ 22:15)  Sedimentation Rate, Erythrocyte: 102 mm/Hr *H* (04-25-21 @ 22:15)      Troponin trend:      03-13 Chol 121 LDL -- HDL 39<L> Trig 66      RADIOLOGY:  -CXR: no acute pathology    -TTE: < from: TTE Echo Complete w/o Contrast w/ Doppler (10.06.20 @ 11:08) >  CONCLUSIONS:     1. Severely reduced left ventricular systolic function. LVEF 15-20%.   2. The basal to mid inferior wall and basal to mid inferoseprtum are akinetic.   3. Grade II left ventricular diastolic dysfunction.   4. Reduced right ventricular systolic function.   5. Device leads are noted in the right heart.   6. Aortic sclerosis without significant stenosis.   7. Mildly dilated left atrium.   8. Mild mitral regurgitation.   9. Pulmonary hypertension present, pulmonary artery systolic pressure is 66 mmHg.  10. No pericardial effusion.  11. Compared to the previous TTE performed on 7/17/2020, there have been no significant interval changes.    < end of copied text >    -CCTA:    -STRESS TEST:    -CATHETERIZATION: < from: Cardiac Cath Lab - Adult (12.12.18 @ 10:29) >  CORONARY CIRCULATION: The coronary circulation is right dominant. Therewas    1-vessel coronary artery disease (LAD). Bypass grafts were patent. Left    main: Normal. LAD: The vessel was medium sized. Ostial LAD: There was a    discrete 95 % stenosis. There was ARIANA grade 3 flow through the vessel    (brisk flow). Proximal LAD: There was a diffuse 70 % stenosis at the site    of a prior stent. There was ARIANA grade 3 flow through the vessel (brisk    flow). Mid LAD: The vessel was small to medium sized. Angiography showed    mild atherosclerosis with no flow limitinglesions. Distal LAD: The vessel    was small to medium sized. Angiography showed mild atherosclerosis with no    flow limiting lesions. The artery was supplied by a patent bypass graft.    Circumflex: The vessel was medium sized. Proximal circumflex:Angiography    showed mild atherosclerosis with no flow limiting lesions. Distal    circumflex: The vessel was small to medium sized. Angiography showed minor    luminal irregularities with no flow limiting lesions. 1st obtuse marginal:    The vesselwas medium sized. Prior stent was patent. There was a discrete    50- 60 % stenosis at a site with no prior intervention, at the ostium of    the vessel segment, at the proximal margin of the stented segment. The    lesion was hazy. There was ARIANA grade 3 flow through the vessel (brisk    flow). RCA: The vessel was medium to large sized. Proximal RCA:    Angiography showed no evidence of disease. Mid RCA: Angiography showed no    evidence of disease. Distal RCA: The vessel was medium to large sized.    There was a discrete 30- 40 % stenosis at the site of a prior stent. There    was ARIANA grade 3 flow through the vessel (brisk flow). Graft to the LAD:    The graft was a medium sized LIMA. Graft angiography showed no evidence of    disease.    < end of copied text >      ECG:    TELEMETRY EVENTS:

## 2021-04-26 NOTE — ED PROVIDER NOTE - PROGRESS NOTE DETAILS
Discussed with orthopedics on call, will evaluate patient. Padmini- Patient refused rectal temp. Ortho performed arthrocentesis of left knee. Cxs sent, started on abx. Will admit, signed out to MAR.

## 2021-04-26 NOTE — CHART NOTE - NSCHARTNOTEFT_GEN_A_CORE
PACU ANESTHESIA ADMISSION NOTE      Procedure: Arthroscopic drainage of knee      Post op diagnosis:  Septic arthritis of knee, left        ____  Intubated  TV:______       Rate: ______      FiO2: ______    ___xx_  Patent Airway    __x__  Full return of protective reflexes    ___x_  Full recovery from anesthesia / back to baseline     Vitals:   T:99.7           R: 16                 BP:  148/67                Sat: 100                  P: 93      Mental Status:  x____ Awake x  _____ Alert   _____ Drowsy   _____ Sedated    Nausea/Vomiting:  _x___ NO  ______Yes,   See Post x- Op Orders          Pain Scale (0-10):  ___x__    Treatment: ____ None    ____ See Post x- Op/PCA Orders    Post - Operative Fluids:   ____ Oral   ____ See Post - Op Orders    Plan: Discharge:   ____Home  x     _____Floor     _____Critical Care    _____  Other:_________________    Comments:

## 2021-04-26 NOTE — GOALS OF CARE CONVERSATION - ADVANCED CARE PLANNING - CONVERSATION DETAILS
Spoke to pt regarding GOC, he endorsed that his wife will decide for him regarding what his end of life care should be. When discussed with the wife, she said it is 'something to think about' and that she will discuss it with her . For now the pt will remain full code.

## 2021-04-26 NOTE — H&P ADULT - NSICDXPASTMEDICALHX_GEN_ALL_CORE_FT
PAST MEDICAL HISTORY:  Anxiety and depression     Atherosclerosis of coronary artery CAD (coronary artery disease)    Blood clot due to device, implant, or graft was on blood thinners    CHF (congestive heart failure) EF ~ 25%    Diabetes on insulin pump    Diabetic neuropathy     Diverticulitis     History of celiac disease     HLD (hyperlipidemia)     HTN (hypertension)     Osteoarthritis     Status post percutaneous transluminal coronary angioplasty 2 stents    STEMI (ST elevation myocardial infarction)

## 2021-04-26 NOTE — CONSULT NOTE ADULT - SUBJECTIVE AND OBJECTIVE BOX
FLOWER MARISCAL [id number]  65y Male      HPI:      PAST MEDICAL & SURGICAL HISTORY:  Status post percutaneous transluminal coronary angioplasty  2 stents    Atherosclerosis of coronary artery  CAD (coronary artery disease)    Osteoarthritis    HLD (hyperlipidemia)    Blood clot due to device, implant, or graft  was on blood thinners    Diabetes  on insulin pump    HTN (hypertension)    CHF (congestive heart failure)  EF ~ 25%    History of celiac disease    Diverticulitis    STEMI (ST elevation myocardial infarction)    Diabetic neuropathy    Anxiety and depression    Other postprocedural status  Fixation hardware in foot LEFT    Stented coronary artery  10/18 heart attack  INFERIOR WALL MI    S/P CABG x 1  2018    S/P TKR (total knee replacement), right  with infection Mrsa   per pt he was cleared from MRSA infection    Surgery, elective  right knee wound debridement    History of open reduction and internal fixation (ORIF) procedure    H/O shoulder surgery  right    AICD (automatic cardioverter/defibrillator) present    Cholecystostomy care  drain inserted  &amp; removed 4 months ago    History of tonsillectomy    History of hip replacement, total, right          MEDICATIONS  (STANDING):  ceFAZolin   IVPB 2000 milliGRAM(s) IV Intermittent every 8 hours  enoxaparin Injectable 40 milliGRAM(s) SubCutaneous daily  lactated ringers. 1000 milliLiter(s) (75 mL/Hr) IV Continuous <Continuous>    MEDICATIONS  (PRN):  acetaminophen   Tablet .. 650 milliGRAM(s) Oral every 6 hours PRN Temp greater or equal to 38C (100.4F), Mild Pain (1 - 3)  HYDROmorphone  Injectable 0.5 milliGRAM(s) IV Push every 10 minutes PRN Severe Pain (7 - 10)  morphine  - Injectable 2 milliGRAM(s) IV Push every 10 minutes PRN Moderate Pain (4 - 6)  morphine  - Injectable 4 milliGRAM(s) IV Push every 4 hours PRN Severe Pain (7 - 10)  ondansetron Injectable 4 milliGRAM(s) IV Push once PRN Nausea and/or Vomiting  oxycodone    5 mG/acetaminophen 325 mG 1 Tablet(s) Oral once PRN Mild Pain (1 - 3)  oxycodone    5 mG/acetaminophen 325 mG 1 Tablet(s) Oral every 6 hours PRN Moderate Pain (4 - 6)      Allergies    ACE inhibitors (Hives)  carvedilol (Other)  enalapril (Hives)  Entresto (Other)    Intolerances        REVIEW OF SYSTEMS    [ ] A ten-point review of systems was otherwise negative except as noted.  [ ] Due to altered mental status/intubation, subjective information were not able to be obtained from the patient. History was obtained, to the extent possible, from review of the chart and collateral sources of information.      Vital Signs Last 24 Hrs  T(C): 37.6 (2021 09:50), Max: 37.6 (2021 09:50)  T(F): 99.7 (2021 09:50), Max: 99.7 (2021 09:50)  HR: 89 (2021 10:05) (82 - 90)  BP: 126/61 (2021 10:05) (115/62 - 148/67)  BP(mean): --  RR: 12 (2021 10:05) (12 - 20)  SpO2: 97% (2021 10:05) (96% - 98%)    PHYSICAL EXAM:  GENERAL: NAD, well-appearing  CHEST/LUNG: Clear to auscultation bilaterally  HEART: Regular rate and rhythm  ABDOMEN: Soft, Nontender, Nondistended;   EXTREMITIES:  No clubbing, cyanosis, or edema      LABS:  Labs:  CAPILLARY BLOOD GLUCOSE      POCT Blood Glucose.: 220 mg/dL (2021 07:57)                          9.8    16.84 )-----------( 295      ( 2021 22:15 )             32.3       Auto Neutrophil %: 85.4 % (21 @ 22:15)  Auto Immature Granulocyte %: 0.7 % (21 @ 22:15)        135  |  100  |  22<H>  ----------------------------<  249<H>  4.5   |  24  |  1.0      Calcium, Total Serum: 9.3 mg/dL (21 @ 22:15)      LFTs:             8.2  | 1.1  | 26       ------------------[127     ( 2021 22:15 )  4.4  | x    | 30          Lipase:x      Amylase:x         Lactate, Blood: 1.4 mmol/L (21 @ 22:15)      Coags:     14.60  ----< 1.27    ( 2021 22:15 )     27.2                Urinalysis Basic - ( 2021 00:31 )    Color: Light Yellow / Appearance: Clear / S.029 / pH: x  Gluc: x / Ketone: Negative  / Bili: Negative / Urobili: <2 mg/dL   Blood: x / Protein: 30 mg/dL / Nitrite: Negative   Leuk Esterase: Negative / RBC: 2 /HPF / WBC 1 /HPF   Sq Epi: x / Non Sq Epi: 0 /HPF / Bacteria: Negative    RADIOLOGY & ADDITIONAL STUDIES:  < from: CT Abdomen and Pelvis w/ IV Cont (21 @ 04:42) >  IMPRESSION:  Since 2021:  1. No evidence of acute intra-abdominal pathology.  2. Decreased area of right upper quadrant subcutaneous stranding at site of prior percutaneous cholecystostomy, without evidence of abscess.    < from: IR Procedure (20 @ 14:55) >  FINDINGS:   1. Ultrasound demonstrates evidence of cholecystitis with a safe direct approach identified.   2. Successful placement of a 8 Qat[place] drainage catheter with locking loop formed within the gallbladder.     < from: IR Procedure (20 @ 09:07) >  INTERPRETATION:  INDICATION AND FOCUSED HISTORY: 64-year-old male with history of cholecystitis post percutaneous cholecystostomy placement on 2020. Patientreports catheter has been clamped for a month and no residual symptoms. Request made for cholecystogram and possible catheter removal.  PROCEDURE:   1. Fluoroscopic guided cholecystogram and catheter removal.     FLOWER MARISCAL [id number]  65y Male    HPI:  65 year old male with PMH of CAD s/p MI, PCI/CABG, CHFrEF (15-20%), has ICD, HTN, celiac disease, DM, neuropathy, chronic b/l LE ulcers presents, severe chronic pain, and history of cholecystostomy  presenting to ED with inability to walk/ambulate 2/2 left knee pain.    PAST MEDICAL & SURGICAL HISTORY:  Status post percutaneous transluminal coronary angioplasty  2 stents  Atherosclerosis of coronary artery  CAD (coronary artery disease)  Osteoarthritis  HLD (hyperlipidemia)  Blood clot due to device, implant, or graft  was on blood thinners  Diabetes  on insulin pump  HTN (hypertension)  CHF (congestive heart failure)  EF ~ 25%  History of celiac disease  Diverticulitis  STEMI (ST elevation myocardial infarction)  Diabetic neuropathy  Anxiety and depression  Other postprocedural status  Fixation hardware in foot LEFT  Stented coronary artery  10/18 heart attack  INFERIOR WALL MI  S/P CABG x 1  2018  S/P TKR (total knee replacement), right  with infection Mrsa  per pt he was cleared from MRSA infection  Surgery, elective  right knee wound debridement  History of open reduction and internal fixation (ORIF) procedure  H/O shoulder surgery  right  AICD (automatic cardioverter/defibrillator) present  Cholecystostomy care  drain inserted 2020 &amp; removed 4 months ago  History of tonsillectomy  History of hip replacement, total, right    MEDICATIONS  (STANDING):  ceFAZolin   IVPB 2000 milliGRAM(s) IV Intermittent every 8 hours  enoxaparin Injectable 40 milliGRAM(s) SubCutaneous daily  lactated ringers. 1000 milliLiter(s) (75 mL/Hr) IV Continuous <Continuous>    MEDICATIONS  (PRN):  acetaminophen   Tablet .. 650 milliGRAM(s) Oral every 6 hours PRN Temp greater or equal to 38C (100.4F), Mild Pain (1 - 3)  HYDROmorphone  Injectable 0.5 milliGRAM(s) IV Push every 10 minutes PRN Severe Pain (7 - 10)  morphine  - Injectable 2 milliGRAM(s) IV Push every 10 minutes PRN Moderate Pain (4 - 6)  morphine  - Injectable 4 milliGRAM(s) IV Push every 4 hours PRN Severe Pain (7 - 10)  ondansetron Injectable 4 milliGRAM(s) IV Push once PRN Nausea and/or Vomiting  oxycodone    5 mG/acetaminophen 325 mG 1 Tablet(s) Oral once PRN Mild Pain (1 - 3)  oxycodone    5 mG/acetaminophen 325 mG 1 Tablet(s) Oral every 6 hours PRN Moderate Pain (4 - 6)    Allergies  ACE inhibitors (Hives)  carvedilol (Other)  enalapril (Hives)  Entresto (Other)    REVIEW OF SYSTEMS    [ X ] A ten-point review of systems was otherwise negative except as noted.  [ ] Due to altered mental status/intubation, subjective information were not able to be obtained from the patient. History was obtained, to the extent possible, from review of the chart and collateral sources of information.    Vital Signs Last 24 Hrs  T(C): 37.6 (2021 09:50), Max: 37.6 (2021 09:50)  T(F): 99.7 (2021 09:50), Max: 99.7 (2021 09:50)  HR: 89 (2021 10:05) (82 - 90)  BP: 126/61 (2021 10:05) (115/62 - 148/67)  RR: 12 (2021 10:05) (12 - 20)  SpO2: 97% (2021 10:05) (96% - 98%)    PHYSICAL EXAM:  GENERAL: NAD, well-appearing  CHEST/LUNG: Clear to auscultation bilaterally  HEART: Regular rate and rhythm  ABDOMEN: Soft, Nontender, Nondistended;   EXTREMITIES:  No clubbing, cyanosis, or edema    LABS:    POCT Blood Glucose.: 220 mg/dL (2021 07:57)                        9.8    16.84 )-----------( 295      ( 2021 22:15 )             32.3       Auto Neutrophil %: 85.4 % (21 @ 22:15)  Auto Immature Granulocyte %: 0.7 % (21 @ 22:15)        135  |  100  |  22<H>  ----------------------------<  249<H>  4.5   |  24  |  1.0      Calcium, Total Serum: 9.3 mg/dL (21 @ 22:15)      LFTs:             8.2  | 1.1  | 26       ------------------[127     ( 2021 22:15 )  4.4  | x    | 30            Lactate, Blood: 1.4 mmol/L (21 @ 22:15)    Coags:     14.60  ----< 1.27    ( 2021 22:15 )     27.2      Urinalysis Basic - ( 2021 00:31 )    Color: Light Yellow / Appearance: Clear / S.029 / pH: x  Gluc: x / Ketone: Negative  / Bili: Negative / Urobili: <2 mg/dL   Blood: x / Protein: 30 mg/dL / Nitrite: Negative   Leuk Esterase: Negative / RBC: 2 /HPF / WBC 1 /HPF   Sq Epi: x / Non Sq Epi: 0 /HPF / Bacteria: Negative    RADIOLOGY & ADDITIONAL STUDIES:  < from: CT Abdomen and Pelvis w/ IV Cont (21 @ 04:42) >  IMPRESSION:  Since 2021:  1. No evidence of acute intra-abdominal pathology.  2. Decreased area of right upper quadrant subcutaneous stranding at site of prior percutaneous cholecystostomy, without evidence of abscess.    < from: IR Procedure (20 @ 14:55) >  FINDINGS:   1. Ultrasound demonstrates evidence of cholecystitis with a safe direct approach identified.   2. Successful placement of a 8 Armenian drainage catheter with locking loop formed within the gallbladder.     < from: IR Procedure (20 @ 09:07) >  INTERPRETATION:  INDICATION AND FOCUSED HISTORY: 64-year-old male with history of cholecystitis post percutaneous cholecystostomy placement on 2020. Patientreports catheter has been clamped for a month and no residual symptoms. Request made for cholecystogram and possible catheter removal.  PROCEDURE:   1. Fluoroscopic guided cholecystogram and catheter removal.     FLOWER MARISCAL [id number]  65y Male    HPI:  65 year old male with PMH of CAD s/p MI, PCI/CABG, CHFrEF (15-20%), has ICD, HTN, celiac disease, DM, neuropathy, chronic b/l LE ulcers presents, severe chronic pain, and history of cholecystostomy tube placement in 2020 for acute cholecystitis s/p removal in May 2020 with multiple presentations including persistent bilious drainage from the prior tract site as well as a RUQ abdominal abscess at the site s/p drainage. He presented to ED with inability to walk/ambulate 2/2 left knee pain, found to have septic arthritis of the joint, he is now s/p arthroscopic drainage.     Reason for Consult: Surgery consulted for persistent bilious drainage from the prior cholecystostomy tube.     PAST MEDICAL & SURGICAL HISTORY:  Status post percutaneous transluminal coronary angioplasty  2 stents  Atherosclerosis of coronary artery  CAD (coronary artery disease)  Osteoarthritis  HLD (hyperlipidemia)  Blood clot due to device, implant, or graft  was on blood thinners  Diabetes  on insulin pump  HTN (hypertension)  CHF (congestive heart failure)  EF ~ 25%  History of celiac disease  Diverticulitis  STEMI (ST elevation myocardial infarction)  Diabetic neuropathy  Anxiety and depression  Other postprocedural status  Fixation hardware in foot LEFT  Stented coronary artery  10/18 heart attack  INFERIOR WALL MI  S/P CABG x 1    S/P TKR (total knee replacement), right  with infection Mrsa  per pt he was cleared from MRSA infection  Surgery, elective  right knee wound debridement  History of open reduction and internal fixation (ORIF) procedure  H/O shoulder surgery  right  AICD (automatic cardioverter/defibrillator) present  Cholecystostomy care  drain inserted  &amp; removed 4 months ago  History of tonsillectomy  History of hip replacement, total, right    MEDICATIONS  (STANDING):  ceFAZolin   IVPB 2000 milliGRAM(s) IV Intermittent every 8 hours  enoxaparin Injectable 40 milliGRAM(s) SubCutaneous daily  lactated ringers. 1000 milliLiter(s) (75 mL/Hr) IV Continuous <Continuous>    MEDICATIONS  (PRN):  acetaminophen   Tablet .. 650 milliGRAM(s) Oral every 6 hours PRN Temp greater or equal to 38C (100.4F), Mild Pain (1 - 3)  HYDROmorphone  Injectable 0.5 milliGRAM(s) IV Push every 10 minutes PRN Severe Pain (7 - 10)  morphine  - Injectable 2 milliGRAM(s) IV Push every 10 minutes PRN Moderate Pain (4 - 6)  morphine  - Injectable 4 milliGRAM(s) IV Push every 4 hours PRN Severe Pain (7 - 10)  ondansetron Injectable 4 milliGRAM(s) IV Push once PRN Nausea and/or Vomiting  oxycodone    5 mG/acetaminophen 325 mG 1 Tablet(s) Oral once PRN Mild Pain (1 - 3)  oxycodone    5 mG/acetaminophen 325 mG 1 Tablet(s) Oral every 6 hours PRN Moderate Pain (4 - 6)    Allergies  ACE inhibitors (Hives)  carvedilol (Other)  enalapril (Hives)  Entresto (Other)    REVIEW OF SYSTEMS    [ X ] A ten-point review of systems was otherwise negative except as noted.  [ ] Due to altered mental status/intubation, subjective information were not able to be obtained from the patient. History was obtained, to the extent possible, from review of the chart and collateral sources of information.    Vital Signs Last 24 Hrs  T(C): 37.6 (2021 09:50), Max: 37.6 (2021 09:50)  T(F): 99.7 (2021 09:50), Max: 99.7 (2021 09:50)  HR: 89 (2021 10:05) (82 - 90)  BP: 126/61 (2021 10:05) (115/62 - 148/67)  RR: 12 (2021 10:05) (12 - 20)  SpO2: 97% (2021 10:05) (96% - 98%)    PHYSICAL EXAM:  GENERAL: NAD  CHEST/LUNG: Clear to auscultation bilaterally  HEART: Regular rate and rhythm  ABDOMEN: Soft, nondistended, nontender   EXTREMITIES: Bilateral lower extremities wrapped, drains exiting at the left knee for drainage. No clubbing, cyanosis, or edema    LABS:    POCT Blood Glucose.: 220 mg/dL (2021 07:57)                        9.8    16.84 )-----------( 295      ( 2021 22:15 )             32.3       Auto Neutrophil %: 85.4 % (21 @ 22:15)  Auto Immature Granulocyte %: 0.7 % (21 @ 22:15)        135  |  100  |  22<H>  ----------------------------<  249<H>  4.5   |  24  |  1.0      Calcium, Total Serum: 9.3 mg/dL (21 @ 22:15)      LFTs:             8.2  | 1.1  | 26       ------------------[127     ( 2021 22:15 )  4.4  | x    | 30            Lactate, Blood: 1.4 mmol/L (21 @ 22:15)    Coags:     14.60  ----< 1.27    ( 2021 22:15 )     27.2      Urinalysis Basic - ( 2021 00:31 )    Color: Light Yellow / Appearance: Clear / S.029 / pH: x  Gluc: x / Ketone: Negative  / Bili: Negative / Urobili: <2 mg/dL   Blood: x / Protein: 30 mg/dL / Nitrite: Negative   Leuk Esterase: Negative / RBC: 2 /HPF / WBC 1 /HPF   Sq Epi: x / Non Sq Epi: 0 /HPF / Bacteria: Negative    RADIOLOGY & ADDITIONAL STUDIES:  < from: CT Abdomen and Pelvis w/ IV Cont (21 @ 04:42) >  IMPRESSION:  Since 2021:  1. No evidence of acute intra-abdominal pathology.  2. Decreased area of right upper quadrant subcutaneous stranding at site of prior percutaneous cholecystostomy, without evidence of abscess.    < from: IR Procedure (20 @ 14:55) >  FINDINGS:   1. Ultrasound demonstrates evidence of cholecystitis with a safe direct approach identified.   2. Successful placement of a 8 Uzbek drainage catheter with locking loop formed within the gallbladder.     < from: IR Procedure (20 @ 09:07) >  INTERPRETATION:  INDICATION AND FOCUSED HISTORY: 64-year-old male with history of cholecystitis post percutaneous cholecystostomy placement on 2020. Patientreports catheter has been clamped for a month and no residual symptoms. Request made for cholecystogram and possible catheter removal.  PROCEDURE:   1. Fluoroscopic guided cholecystogram and catheter removal.

## 2021-04-26 NOTE — H&P ADULT - NSHPPHYSICALEXAM_GEN_ALL_CORE
VITALS:   Vital Signs Last 24 Hrs  T(C): 37.6 (26 Apr 2021 09:50), Max: 37.6 (26 Apr 2021 09:50)  T(F): 99.7 (26 Apr 2021 09:50), Max: 99.7 (26 Apr 2021 09:50)  HR: 89 (26 Apr 2021 10:05) (82 - 90)  BP: 126/61 (26 Apr 2021 10:05) (115/62 - 148/67)  BP(mean): --  RR: 12 (26 Apr 2021 10:05) (12 - 20)  SpO2: 97% (26 Apr 2021 10:05) (96% - 98%)  I&O's Summary    CAPILLARY BLOOD GLUCOSE      POCT Blood Glucose.: 265 mg/dL (26 Apr 2021 10:30)      PHYSICAL EXAM:  General: WN/WD NAD, fatigued post op, cachectic   HEENT: PERRLA, EOMI, moist mucous membranes, jaundice in the sclera bl  Neurology: A&Ox3, nonfocal, ROSEN x 4  Respiratory: CTA B/L, normal respiratory effort, no wheezes, crackles, rales  CV: RRR, S1S2  Abdominal: Soft, NT, ND active bilious drainage of wound in the URQ of abdomen, non tender, no erythema, discharge is bilious and mucoid   Extremities: jaundice in the finger tips, No edema, + peripheral pulses, faint, extremities cool to the touch, knees wrapped, left knee draining   Incisions:   Tubes: VITALS:   Vital Signs Last 24 Hrs  T(C): 37.6 (26 Apr 2021 09:50), Max: 37.6 (26 Apr 2021 09:50)  T(F): 99.7 (26 Apr 2021 09:50), Max: 99.7 (26 Apr 2021 09:50)  HR: 89 (26 Apr 2021 10:05) (82 - 90)  BP: 126/61 (26 Apr 2021 10:05) (115/62 - 148/67)  BP(mean): --  RR: 12 (26 Apr 2021 10:05) (12 - 20)  SpO2: 97% (26 Apr 2021 10:05) (96% - 98%)  I&O's Summary    CAPILLARY BLOOD GLUCOSE      POCT Blood Glucose.: 265 mg/dL (26 Apr 2021 10:30)      PHYSICAL EXAM:  General: WN/WD NAD, fatigued post op, cachectic   HEENT: PERRLA, EOMI, moist mucous membranes  Neurology: A&Ox3, nonfocal, ROSEN x 4  Respiratory: CTA B/L, normal respiratory effort, no wheezes, crackles, rales  CV: RRR, S1S2  Abdominal: Soft, NT, ND active bilious drainage of wound in the URQ of abdomen, non tender, no erythema, discharge is bilious and mucoid   Extremities: No edema, + peripheral pulses, faint, extremities cool to the touch, knees wrapped, left knee draining   Incisions:   Tubes:

## 2021-04-26 NOTE — H&P ADULT - ASSESSMENT
65 year old male with PMH of CAD s/p MI, PCI/CABG, CHFrEF (15-20%), has ICD, HTN, celiac disease, DM, neuropathy, chronic b/l LE ulcers presents, severe chronic pain,  hx infected R TKR with MRSA (was eventually cemented so has limited ROM R knee), and history of cholecystostomy tube placement in February 2020 for acute cholecystitis s/p removal in May 2020 with multiple presentations including persistent bilious drainage from the prior tract site as well as a RUQ abdominal abscess at the site s/p drainage admitted for septic arthiritis being managed for draining billary tract.    # Septic arthritis  - 2 weeks of worsening left knee pain and welling s/p knee ejection   - Knee aspirated 04/26: clougy, yellow,CPPD crystals present, neutrophils >50K, RBC >8k  - s/p I&D by ortho 04/26  - started on ancef by surg  - f/u ID recs  - ortho recs being follwed,     # Bilious Drainage from abdominal wound  # Jaundice   -  cholecystostomy tube placement in February 2020 for acute cholecystitis s/p removal in May 2020 with multiple presentations including persistent bilious drainage from the prior tract site as well as a RUQ abdominal abscess at the site s/p drainage  - Pt juandiced in the finger tips and sclera  - 04/26: T-bili 1.1, alk phos 127, AST/ALT 26/30  - 04/26: CT A&P No evidence of acute intra-abdominal pathology. Decreased area of right upper quadrant subcutaneous stranding at site of prior percutaneous cholecystostomy, without evidence of abscess.   - f/u hepatic panel, LDH  - consult GI  - Order HIDA scan  - possible ERCP  - keep pt low fat diet for now     # Cachexia, excessive weight loss in the past 1+ years  - pt endorses losing 70+ Ilbs in the last year and a half  - BMI 23.5  - pt appears malnourished  - consult nutrition   - likely multifactorial in the setting of chronic disease     #CAD s/p 2 stent PCI that thrombosed, s/p CABG  #CHFrEF EF 15% s/p AICD  #HLD  - hx of 2 stents that thrombosed caused an MI --> CABG  - TTE 10/20: G2DD, EF 15-20%  - has AICD  - being followed by dr. Zgheib, f/u consult  - c/w home aspirin/statin/effient/metoprolol/spironolactone/lasix/digoxin       #Choreiform Movement Disorder - Etiology Unclear   - Was admitted in 03/21 for this disorder, f/u neuro as o/p  - CTH negative, unknown family history  - Neurology consulted during previous admission:   ddx: sporadic paroxysmal non-kinesogenic choreoathetosis vs celiac-related progressive chorea   - c/w Clonazepam 0.5mg TID   - Celiac Abs, paraneoplastic markers, autoimmune markers (OBED, Sjogerns antibodies, lupus, etc.), SPEP/UPEP, Ceruloplasmin, copper level, lyme WB, ACE level, HgbA1C, CRMP5/CV2 abs, NMDA receptor abs, CASPR2 ab, LGI1 ab, B12, TSH, thyroid autoantibodies, PTH levels were sent last admission f/u as o/p with neuro    # LE Neuropathy   - continue Duloxetine     # Microcytic anemia likely anemia of chronic dx  - f/u iron study  - monitor CBC daily  - transfuse if <8  - stable for now, get T&S if hgb downtrending     # DM2  - monitor FS  - cont insulin pump, pt sets the pump up himself    # right knee replacement s/p revision s/p infection now with antibiotic impragnated knee spacer  # right leg wound   - pain management, f/u ortho recs    DVT ppx: lovenox   Gi ppx: ppi  Diet: dash/TLC   Dispo: pending GI, ID, ortho  Code: full, no AD, GOC   65 year old male with PMH of CAD s/p MI, PCI/CABG, CHFrEF (15-20%), has ICD, HTN, celiac disease, DM, neuropathy, chronic b/l LE ulcers presents, severe chronic pain,  hx infected R TKR with MRSA (was eventually cemented so has limited ROM R knee), and history of cholecystostomy tube placement in February 2020 for acute cholecystitis s/p removal in May 2020 with multiple presentations including persistent bilious drainage from the prior tract site as well as a RUQ abdominal abscess at the site s/p drainage admitted for septic arthiritis being managed for draining billary tract.    # Septic arthritis  - 2 weeks of worsening left knee pain and welling s/p knee ejection   - Knee aspirated 04/26: clougy, yellow,CPPD crystals present, neutrophils >50K, RBC >8k  - s/p I&D by ortho 04/26  - started on ancef by surg  - f/u ID recs  - ortho recs being followed,   - f/u bcx, knee aspirate cx    # Bilious Drainage from abdominal wound  # Jaundice   -  cholecystostomy tube placement in February 2020 for acute cholecystitis s/p removal in May 2020 with multiple presentations including persistent bilious drainage from the prior tract site as well as a RUQ abdominal abscess at the site s/p drainage  - Pt juandiced in the finger tips and sclera  - 04/26: T-bili 1.1, alk phos 127, AST/ALT 26/30  - 04/26: CT A&P No evidence of acute intra-abdominal pathology. Decreased area of right upper quadrant subcutaneous stranding at site of prior percutaneous cholecystostomy, without evidence of abscess.   - f/u hepatic panel, LDH  - consult GI  - Order HIDA scan  - possible ERCP with stent placement  - keep pt low fat diet for now     # Cachexia, excessive weight loss in the past 1+ years  - pt endorses losing 70+ Ilbs in the last year and a half  - BMI 23.5  - pt appears malnourished  - consult nutrition   - likely multifactorial in the setting of chronic disease     #CAD s/p 2 stent PCI that thrombosed, s/p CABG  #CHFrEF EF 15% s/p AICD  #HLD  - hx of 2 stents that thrombosed caused an MI --> CABG  - TTE 10/20: G2DD, EF 15-20%  - has AICD  - being followed by dr. Lopez, f/u consult  - c/w home aspirin/statin/effient/metoprolol/spironolactone/digoxin   - lasix 40mg PO prn for fluid overload  - f/u TTE    #Choreiform Movement Disorder - Etiology Unclear   - Was admitted in 03/21 for this disorder, f/u neuro as o/p  - CTH negative, unknown family history  - Neurology consulted during previous admission:   ddx: sporadic paroxysmal non-kinesogenic choreoathetosis vs celiac-related progressive chorea   - c/w Clonazepam 0.5mg TID   - Celiac Abs, paraneoplastic markers, autoimmune markers (OBED, Sjogerns antibodies, lupus, etc.), SPEP/UPEP, Ceruloplasmin, copper level, lyme WB, ACE level, HgbA1C, CRMP5/CV2 abs, NMDA receptor abs, CASPR2 ab, LGI1 ab, B12, TSH, thyroid autoantibodies, PTH levels were sent last admission f/u as o/p with neuro    # LE Neuropathy   - continue Duloxetine     # Microcytic anemia likely anemia of chronic dx  - f/u iron study  - monitor CBC daily  - transfuse if <8  - stable for now, get T&S if hgb downtrending     # DM2  - monitor FS  - cont insulin pump, pt sets the pump up himself    # right knee replacement s/p revision s/p infection now with antibiotic impragnated knee spacer  # right leg wound   - pain management, f/u ortho recs    DVT ppx: lovenox   Gi ppx: ppi  Diet: dash/TLC   Dispo: pending GI, ID, ortho  Code: full, no AD, GOC   65 year old male with PMH of CAD s/p MI, PCI/CABG, CHFrEF (15-20%), has ICD, HTN, celiac disease, DM, neuropathy, chronic b/l LE ulcers presents, severe chronic pain,  hx infected R TKR with MRSA (was eventually cemented so has limited ROM R knee), and history of cholecystostomy tube placement in February 2020 for acute cholecystitis s/p removal in May 2020 with multiple presentations including persistent bilious drainage from the prior tract site as well as a RUQ abdominal abscess at the site s/p drainage admitted for septic arthiritis being managed for draining billary tract.    # Septic arthritis  - 2 weeks of worsening left knee pain and welling s/p knee ejection   - Knee aspirated 04/26: clougy, yellow,CPPD crystals present, neutrophils >50K, RBC >8k  - s/p I&D by ortho 04/26  - started on ancef by surg  - f/u ID recs  - ortho recs being followed,   - f/u bcx, knee aspirate cx    # Bilious Drainage from abdominal wound  # Jaundice   -  cholecystostomy tube placement in February 2020 for acute cholecystitis s/p removal in May 2020 with multiple presentations including persistent bilious drainage from the prior tract site as well as a RUQ abdominal abscess at the site s/p drainage  - Pt juandiced in the finger tips and sclera  - 04/26: T-bili 1.1, alk phos 127, AST/ALT 26/30  - 04/26: CT A&P No evidence of acute intra-abdominal pathology. Decreased area of right upper quadrant subcutaneous stranding at site of prior percutaneous cholecystostomy, without evidence of abscess.   - f/u hepatic panel, LDH  - consult GI  - Order HIDA scan  - possible ERCP with stent placement  - keep pt low fat diet for now     # Cachexia, excessive weight loss in the past 1+ years  - pt endorses losing 70+ Ilbs in the last year and a half  - BMI 23.5  - pt appears malnourished  - consult nutrition   - likely multifactorial in the setting of chronic disease     #CAD s/p 2 stent PCI that thrombosed, s/p CABG  #CHFrEF EF 15% s/p AICD  #HLD  - hx of 2 stents that thrombosed caused an MI --> CABG  - TTE 10/20: G2DD, EF 15-20%  - has AICD  - being followed by dr. Lopez, f/u consult  - c/w home aspirin/statin/effient/metoprolol/spironolactone/digoxin   - lasix 40mg PO prn for fluid overload  - f/u TTE    #Choreiform Movement Disorder - Etiology Unclear   - Was admitted in 03/21 for this disorder, f/u neuro as o/p  - CTH negative, unknown family history  - Neurology consulted during previous admission:   ddx: sporadic paroxysmal non-kinesogenic choreoathetosis vs celiac-related progressive chorea   - c/w Clonazepam 0.5mg TID   - Celiac Abs, paraneoplastic markers, autoimmune markers (OBED, Sjogerns antibodies, lupus, etc.), SPEP/UPEP, Ceruloplasmin, copper level, lyme WB, ACE level, HgbA1C, CRMP5/CV2 abs, NMDA receptor abs, CASPR2 ab, LGI1 ab, B12, TSH, thyroid autoantibodies, PTH levels were sent last admission f/u as o/p with neuro    # LE Neuropathy   - continue Duloxetine     # Microcytic anemia likely anemia of chronic dx  - f/u iron study  - monitor CBC daily  - transfuse if <8  - stable for now, get T&S if hgb downtrending     # DM2  - monitor FS  - cont insulin pump, pt sets the pump up himself    # right knee replacement s/p revision s/p infection now with antibiotic impragnated knee spacer  # right leg wound   - pain management, f/u ortho recs    DVT ppx: lovenox   Gi ppx: ppi  Diet: dash/TLC   Dispo: pending GI, ID, ortho  Code: full, no AD, wife on her way to hospital, GOC with pt and wife   65 year old male with PMH of CAD s/p MI, PCI/CABG, CHFrEF (15-20%), has ICD, HTN, celiac disease, DM, neuropathy, chronic b/l LE ulcers presents, severe chronic pain,  hx infected R TKR with MRSA (was eventually cemented so has limited ROM R knee), and history of cholecystostomy tube placement in February 2020 for acute cholecystitis s/p removal in May 2020 with multiple presentations including persistent bilious drainage from the prior tract site as well as a RUQ abdominal abscess at the site s/p drainage admitted for septic arthiritis being managed for draining billary tract.    # Septic arthritis  - 2 weeks of worsening left knee pain and welling s/p knee ejection   - Knee aspirated 04/26: clougy, yellow,CPPD crystals present, neutrophils >50K, RBC >8k  - s/p I&D by ortho 04/26  - started on ancef by surg  - f/u ID recs  - ortho recs being followed,   - f/u bcx, knee aspirate cx    # Bilious Drainage from abdominal wound  -  cholecystostomy tube placement in February 2020 for acute cholecystitis s/p removal in May 2020 with multiple presentations including persistent bilious drainage from the prior tract site as well as a RUQ abdominal abscess at the site s/p drainage  - 04/26: T-bili 1.1, alk phos 127, AST/ALT 26/30  - 04/26: CT A&P No evidence of acute intra-abdominal pathology. Decreased area of right upper quadrant subcutaneous stranding at site of prior percutaneous cholecystostomy, without evidence of abscess.   - f/u hepatic panel, LDH  - consult GI  - Order HIDA scan  - possible ERCP with stent placement  - keep pt low fat diet for now     # Cachexia, excessive weight loss in the past 1+ years  - pt endorses losing 70+ Ilbs in the last year and a half  - BMI 23.5  - pt appears malnourished  - consult nutrition   - likely multifactorial in the setting of chronic disease     #CAD s/p 2 stent PCI that thrombosed, s/p CABG  #CHFrEF EF 15% s/p AICD  #HLD  - hx of 2 stents that thrombosed caused an MI --> CABG  - TTE 10/20: G2DD, EF 15-20%  - has AICD  - being followed by dr. Lopez, f/u consult  - c/w home aspirin/statin/effient/metoprolol/spironolactone/digoxin   - lasix 40mg PO prn for fluid overload  - f/u TTE    #Choreiform Movement Disorder - Etiology Unclear   - Was admitted in 03/21 for this disorder, f/u neuro as o/p  - CTH negative, unknown family history  - Neurology consulted during previous admission:   ddx: sporadic paroxysmal non-kinesogenic choreoathetosis vs celiac-related progressive chorea   - c/w Clonazepam 0.5mg TID   - Celiac Abs, paraneoplastic markers, autoimmune markers (OBED, Sjogerns antibodies, lupus, etc.), SPEP/UPEP, Ceruloplasmin, copper level, lyme WB, ACE level, HgbA1C, CRMP5/CV2 abs, NMDA receptor abs, CASPR2 ab, LGI1 ab, B12, TSH, thyroid autoantibodies, PTH levels were sent last admission f/u as o/p with neuro    # LE Neuropathy   - continue Duloxetine     # Microcytic anemia likely anemia of chronic dx  - f/u iron study  - monitor CBC daily  - transfuse if <8  - stable for now, get T&S if hgb downtrending     # DM2  - monitor FS  - cont insulin pump, pt sets the pump up himself    # right knee replacement s/p revision s/p infection now with antibiotic impragnated knee spacer  # right leg wound   - pain management, f/u ortho recs    DVT ppx: lovenox   Gi ppx: ppi  Diet: dash/TLC   Dispo: pending GI, ID, ortho  Code: full, no AD, wife on her way to hospital, GOC was discussed. wife said she will discuss it with her .

## 2021-04-26 NOTE — ED PROVIDER NOTE - PSH
AICD (automatic cardioverter/defibrillator) present    Cholecystostomy care  drain inserted 2020 & removed 4 months ago  H/O shoulder surgery  right  History of hip replacement, total, right    History of open reduction and internal fixation (ORIF) procedure    History of tonsillectomy    Other postprocedural status  Fixation hardware in foot LEFT  S/P CABG x 1  2018  S/P TKR (total knee replacement), right  with infection Mrsa   per pt he was cleared from MRSA infection  Stented coronary artery  10/18 heart attack  INFERIOR WALL MI  Surgery, elective  right knee wound debridement

## 2021-04-26 NOTE — CONSULT NOTE ADULT - ASSESSMENT
IMPRESSION:  - CAD s/p PCI to RCA and OM1, s/p LIMA to LAD (2018)   - Severe ischemic cardiomyopathy (EF 15-20%) s/p AICD  - DMII, HTN, DL  - Currently admitted with septic arthritis of the knee s/p drainage    PLAN:  - continue same home meds (aspirin, prasugrel, digoxin, lasix, toprol, rosuvastatin, aldactone)  - Avoid fluid overload  - Careful IV hydration if needed  - 2d echo  - Will follow IMPRESSION:  - CAD s/p PCI to RCA and OM1, s/p LIMA to LAD (2018)   - Severe ischemic cardiomyopathy (EF 15-20%) s/p AICD - currently compensated - not fluid overloaded  - DMII, HTN, DL  - Currently admitted with septic arthritis of the knee s/p drainage    PLAN:  - continue same home meds (aspirin, prasugrel, digoxin, toprol, rosuvastatin, aldactone)  - Avoid fluid overload  - Careful IV hydration  - Lasix prn for fluid overload   - 2d echo  - Will follow

## 2021-04-26 NOTE — H&P ADULT - HISTORY OF PRESENT ILLNESS
65 year old male with PMH of CAD s/p MI, PCI/CABG, CHFrEF (15-20%), has ICD, HTN, celiac disease, DM, neuropathy, chronic b/l LE ulcers presents, severe chronic pain,  hx infected R TKR with MRSA (was eventually cemented so has limited ROM R knee), and history of cholecystostomy tube placement in February 2020 for acute cholecystitis s/p removal in May 2020 with multiple presentations including persistent bilious drainage from the prior tract site as well as a RUQ abdominal abscess at the site s/p drainage. He presented to ED with inability to walk/ambulate 2/2 left knee pain. Pt has hx of knee pain and infections. 2 weeks ago pt received a 'steroid' shot for his left knee for pain. 2 days later, his knee began to swell and he was unable to walk or bend the knee. It progressively gotten worse to the point where the pain was unbearable so he presented to the ED.     Pt endorses a weight loss >70 lbs over the last year, there is bilious drainage coming from where he had a prior per sudhir, jaundice in the finger tips and sclera. Recent admission for uncontrollable movements, no diagnosis given. Pt denies f/c/n/v, chest pain, headaches, UTI symptoms     In the ED:  Joint was aspirated cloudy yellow fluid was seen and sent for cultures. Neutrophil count >50,000 and RBC >8000. Ortho consulted for septic arthritis, s/p arthroscopic drainage. Surg consulted for bile drainage, HIDA scan, consult GI, possible ERCP. CT A&P, No evidence of acute intra-abdominal pathology. Decreased area of right upper quadrant subcutaneous stranding at site of prior percutaneous cholecystostomy, without evidence of abscess. Vitally stable. Admitted to medicine for medical management.    The patient is being followed by Neurology, and he is followed by Cardiology Dr. Lopez in  and Dr. Lilia Caldera in [facility], also by Endo Dr. Cormier at [facility].

## 2021-04-26 NOTE — ED PROVIDER NOTE - OBJECTIVE STATEMENT
65 year old male with PMH of CAD s/p MI, PCI/CABG, CHFrEF (15-20%), has ICD, HTN, celiac disease, DM, neuropathy, chronic b/l LE ulcers presents, and severe chronic pain presenting to ED with inability to walk/ambulate 2/2 left knee pain. Patient has chronic pain, neuropathy, and chronic skin wounds to RLE. Patient noticed he could not bear weight on left knee today, normally able to walk limited distance with walker. Denies any f/c, CP, SOB, cough, abd pain, n/v/d, or injuries/traumas/falls/syncope. +chronic knee effusions, no overlying skin wounds on left knee. 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

## 2021-04-26 NOTE — CONSULT NOTE ADULT - SUBJECTIVE AND OBJECTIVE BOX
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 and antibiotic spacer placement  DOS: 9/18/2020, 6 months ago  Surgeon: Jose Castro MD @ [facility]  At that time he also underwent wound coverage with medial gastroc flap and STSG; also LLE wound I&D with STSG  Patient also underwent right hip ORIF in January 2020 here at [facility], he has no hip complaints     He presents today with 2 days of atraumatic left knee pain  Also present are multiple lower extremity wounds  He states that he does local wound care for the lower extremity wounds    PMH/PSH  ^LEG PAIN    Family history of heart disease (Mother)    Family history of heart disease (Mother)    Family history of allergies    MEWS Score    Status post percutaneous transluminal coronary angioplasty    History of surgery to heart and great vessels, presenting hazards to health    Atherosclerosis of coronary artery    Type 2 diabetes mellitus with neurological manifestations    Type 2 diabetes mellitus    HTN (hypertension)    Osteoarthritis    Chronic systolic CHF (congestive heart failure)    HLD (hyperlipidemia)    Hyperkalemia    COPD, moderate    CKD (chronic kidney disease) stage 2, GFR 60-89 ml/min    Blood clot due to device, implant, or graft    Diabetes    HTN (hypertension)    CHF (congestive heart failure)    History of celiac disease    MRSA bacteremia    Diverticulitis    STEMI (ST elevation myocardial infarction)    Diabetic neuropathy    Celiac disease    Anxiety and depression    Diabetic foot ulcer    HTN (hypertension)    CHF (congestive heart failure)    Other postprocedural status    History of surgery to major organs, presenting hazards to health    Stented coronary artery    S/P CABG x 1    S/P TKR (total knee replacement), right    Surgery, elective    Surgery, elective    History of open reduction and internal fixation (ORIF) procedure    H/O shoulder surgery    AICD (automatic cardioverter/defibrillator) present    Cholecystostomy care    History of tonsillectomy    History of hip replacement, total, right    LEG PAIN    17    SysAdmin_VstLnk    SysAdmin_VisitLink        Medications      Allergies  ACE inhibitors (Hives)  carvedilol (Other)  enalapril (Hives)  Entresto (Other)        T(C): 36.6 (04-25-21 @ 21:12), Max: 36.6 (04-25-21 @ 21:12)  HR: 89 (04-25-21 @ 21:12) (89 - 89)  BP: 124/57 (04-25-21 @ 21:12) (124/57 - 124/57)  RR: 20 (04-25-21 @ 21:12) (20 - 20)  SpO2: 98% (04-25-21 @ 21:12) (98% - 98%)    Physical Exam  NAD  Breathing comfortably on RA  Resting comfortably  LLE  +Effusion  TTP over left knee  Pain with micromotion  No erythema or drainage from knee  Anterior tibial ulcer noted  Motor: TA/EHL/Gastroc intact  Sensory: SP/DP/Dueñas/Sa intact  Vasc: foot WWP, 2+ DP pulse    Labs                        9.8    16.84 )-----------( 295      ( 25 Apr 2021 22:15 )             32.3     04-25    135  |  100  |  22<H>  ----------------------------<  249<H>  4.5   |  24  |  1.0    Ca    9.3      25 Apr 2021 22:15    TPro  8.2<H>  /  Alb  4.4  /  TBili  1.1  /  DBili  x   /  AST  26  /  ALT  30  /  AlkPhos  127<H>  04-25    LIVER FUNCTIONS - ( 25 Apr 2021 22:15 )  Alb: 4.4 g/dL / Pro: 8.2 g/dL / ALK PHOS: 127 U/L / ALT: 30 U/L / AST: 26 U/L / GGT: x           PT/INR - ( 25 Apr 2021 22:15 )   PT: 14.60 sec;   INR: 1.27 ratio         PTT - ( 25 Apr 2021 22:15 )  PTT:27.2 sec    Img  BL knee XR  Right knee antibiotic spacer in place  Left knee, no hardware, effusion, DJD    Procedure  Under aseptic conditions 40cc of opaque straw colored fluid were aspirated from the left knee  Fluid sent for cell count, crystals, culture    A/P: Patient is a 65y year old Male with spontaneous left knee pain and effusion    Left knee synovial fluid sent for laboratory analysis  This fluid may represent an infectious or inflammatory process  Further plan pending results of fluid analysis         Orthopaedics Consult Note    FLOWER MARISCAL  [id number]    Patient is a 65y year old Male with 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 and antibiotic spacer placement  DOS: 9/18/2020, 6 months ago  Surgeon: Jose Castro MD @ [facility]  At that time he also underwent wound coverage with medial gastroc flap and STSG; also LLE wound I&D with STSG  Patient also underwent right hip ORIF in January 2020 here at [facility], he has no hip complaints     He presents today with 2 days of atraumatic left knee pain  Also present are multiple lower extremity wounds  He states that he does local wound care for the lower extremity wounds    PMH/PSH  ^LEG PAIN    Family history of heart disease (Mother)    Family history of heart disease (Mother)    Family history of allergies    MEWS Score    Status post percutaneous transluminal coronary angioplasty    History of surgery to heart and great vessels, presenting hazards to health    Atherosclerosis of coronary artery    Type 2 diabetes mellitus with neurological manifestations    Type 2 diabetes mellitus    HTN (hypertension)    Osteoarthritis    Chronic systolic CHF (congestive heart failure)    HLD (hyperlipidemia)    Hyperkalemia    COPD, moderate    CKD (chronic kidney disease) stage 2, GFR 60-89 ml/min    Blood clot due to device, implant, or graft    Diabetes    HTN (hypertension)    CHF (congestive heart failure)    History of celiac disease    MRSA bacteremia    Diverticulitis    STEMI (ST elevation myocardial infarction)    Diabetic neuropathy    Celiac disease    Anxiety and depression    Diabetic foot ulcer    HTN (hypertension)    CHF (congestive heart failure)    Other postprocedural status    History of surgery to major organs, presenting hazards to health    Stented coronary artery    S/P CABG x 1    S/P TKR (total knee replacement), right    Surgery, elective    Surgery, elective    History of open reduction and internal fixation (ORIF) procedure    H/O shoulder surgery    AICD (automatic cardioverter/defibrillator) present    Cholecystostomy care    History of tonsillectomy    History of hip replacement, total, right    LEG PAIN    17    SysAdmin_VstLnk    SysAdmin_VisitLink        Medications      Allergies  ACE inhibitors (Hives)  carvedilol (Other)  enalapril (Hives)  Entresto (Other)        T(C): 36.6 (04-25-21 @ 21:12), Max: 36.6 (04-25-21 @ 21:12)  HR: 89 (04-25-21 @ 21:12) (89 - 89)  BP: 124/57 (04-25-21 @ 21:12) (124/57 - 124/57)  RR: 20 (04-25-21 @ 21:12) (20 - 20)  SpO2: 98% (04-25-21 @ 21:12) (98% - 98%)    Physical Exam  NAD  Breathing comfortably on RA  Resting comfortably  LLE  +Effusion  TTP over left knee  Pain with micromotion  No erythema or drainage from knee  Anterior tibial ulcer noted  Motor: TA/EHL/Gastroc intact  Sensory: SP/DP/Dueñas/Sa intact  Vasc: foot WWP, 2+ DP pulse    Labs    Cell count 53K  90% PMN  Crystal  Culture                          9.8    16.84 )-----------( 295      ( 25 Apr 2021 22:15 )             32.3     04-25    135  |  100  |  22<H>  ----------------------------<  249<H>  4.5   |  24  |  1.0    Ca    9.3      25 Apr 2021 22:15    TPro  8.2<H>  /  Alb  4.4  /  TBili  1.1  /  DBili  x   /  AST  26  /  ALT  30  /  AlkPhos  127<H>  04-25    LIVER FUNCTIONS - ( 25 Apr 2021 22:15 )  Alb: 4.4 g/dL / Pro: 8.2 g/dL / ALK PHOS: 127 U/L / ALT: 30 U/L / AST: 26 U/L / GGT: x           PT/INR - ( 25 Apr 2021 22:15 )   PT: 14.60 sec;   INR: 1.27 ratio         PTT - ( 25 Apr 2021 22:15 )  PTT:27.2 sec    Img  BL knee XR  Right knee antibiotic spacer in place  Left knee, no hardware, effusion, DJD    Procedure  Under aseptic conditions 40cc of opaque straw colored fluid were aspirated from the left knee  Fluid sent for cell count, crystals, culture    A/P: Patient is a 65y year old Male with spontaneous left knee pain and effusion    NPO now  Tentatively plan for OR this AM - urgent surgery  Follow up crystal / culture result

## 2021-04-26 NOTE — H&P ADULT - NSICDXPASTSURGICALHX_GEN_ALL_CORE_FT
PAST SURGICAL HISTORY:  AICD (automatic cardioverter/defibrillator) present     Cholecystostomy care drain inserted 2020 & removed 4 months ago    H/O shoulder surgery right    History of hip replacement, total, right     History of open reduction and internal fixation (ORIF) procedure     History of tonsillectomy     Other postprocedural status Fixation hardware in foot LEFT    S/P CABG x 1 2018    S/P TKR (total knee replacement), right with infection Mrsa   per pt he was cleared from MRSA infection    Stented coronary artery 10/18 heart attack  INFERIOR WALL MI    Surgery, elective right knee wound debridement

## 2021-04-26 NOTE — H&P ADULT - NSHPLABSRESULTS_GEN_ALL_CORE
LABS:                        9.8    16.84 )-----------( 295      ( 25 Apr 2021 22:15 )             32.3     04-25    135  |  100  |  22<H>  ----------------------------<  249<H>  4.5   |  24  |  1.0    Ca    9.3      25 Apr 2021 22:15    TPro  8.2<H>  /  Alb  4.4  /  TBili  1.1  /  DBili  x   /  AST  26  /  ALT  30  /  AlkPhos  127<H>  04-25    PT/INR - ( 25 Apr 2021 22:15 )   PT: 14.60 sec;   INR: 1.27 ratio         PTT - ( 25 Apr 2021 22:15 )  PTT:27.2 sec

## 2021-04-26 NOTE — CONSULT NOTE ADULT - SUBJECTIVE AND OBJECTIVE BOX
FLOWER MARISCAL  65y, Male  Allergy: ACE inhibitors (Hives)  carvedilol (Other)  enalapril (Hives)  Entresto (Other)      CHIEF COMPLAINT: Septic arthritis (2021 11:34)      LOS      HPI:  65 year old male with PMH of CAD s/p MI, PCI/CABG, CHFrEF (15-20%), has ICD, HTN, celiac disease, DM, neuropathy, chronic b/l LE ulcers presents, severe chronic pain,  hx infected R TKR with MRSA (was eventually cemented so has limited ROM R knee), and history of cholecystostomy tube placement in 2020 for acute cholecystitis s/p removal in May 2020 with multiple presentations including persistent bilious drainage from the prior tract site as well as a RUQ abdominal abscess at the site s/p drainage. He presented to ED with inability to walk/ambulate 2/2 left knee pain. Pt has hx of knee pain and infections. 2 weeks ago pt received a 'steroid' shot for his left knee for pain. 2 days later, his knee began to swell and he was unable to walk or bend the knee. It progressively gotten worse to the point where the pain was unbearable so he presented to the ED.     Pt endorses a weight loss >70 lbs over the last year, there is bilious drainage coming from where he had a prior per sudhir, jaundice in the finger tips and sclera. Recent admission for uncontrollable movements, no diagnosis given. Pt denies f/c/n/v, chest pain, headaches, UTI symptoms     In the ED:  Joint was aspirated cloudy yellow fluid was seen and sent for cultures. Neutrophil count >50,000 and RBC >8000. Ortho consulted for septic arthritis, s/p arthroscopic drainage. Surg consulted for bile drainage, HIDA scan, consult GI, possible ERCP. CT A&P, No evidence of acute intra-abdominal pathology. Decreased area of right upper quadrant subcutaneous stranding at site of prior percutaneous cholecystostomy, without evidence of abscess. Vitally stable. Admitted to medicine for medical management.    The patient is being followed by Neurology, and he is followed by Cardiology Dr. Lopez in  and Dr. Lilia Caldera in [facility], also by Endo Dr. Cormier at [facility].     (2021 11:34)      INFECTIOUS DISEASE HISTORY:  History as above.   Hx of Recurrent right kkne PJI- MRSA from 2019  Completed 6 week course of vancomycin/rifampin with antibiotic spacer placed in 2020  COurse complicated with RUQ intradermal abscess that grew VRE faecium and Pseudomonas.   He has completed additional course of vancomycin/cefepime (per previous ID notes, ended 10/29/2020    PAST MEDICAL & SURGICAL HISTORY:  Status post percutaneous transluminal coronary angioplasty  2 stents    Atherosclerosis of coronary artery  CAD (coronary artery disease)    Osteoarthritis    HLD (hyperlipidemia)    Blood clot due to device, implant, or graft  was on blood thinners    Diabetes  on insulin pump    HTN (hypertension)    CHF (congestive heart failure)  EF ~ 25%    History of celiac disease    Diverticulitis    STEMI (ST elevation myocardial infarction)    Diabetic neuropathy    Anxiety and depression    Other postprocedural status  Fixation hardware in foot LEFT    Stented coronary artery  10/18 heart attack  INFERIOR WALL MI    S/P CABG x 1  2018    S/P TKR (total knee replacement), right  with infection Mrsa   per pt he was cleared from MRSA infection    Surgery, elective  right knee wound debridement    History of open reduction and internal fixation (ORIF) procedure    H/O shoulder surgery  right    AICD (automatic cardioverter/defibrillator) present    Cholecystostomy care  drain inserted  &amp; removed 4 months ago    History of tonsillectomy    History of hip replacement, total, right        FAMILY HISTORY  Family history of heart disease (Mother)    Family history of heart disease (Mother)    Family history of allergies        SOCIAL HISTORY  Social History:  denies smoking/etoh/illicit drug use (2021 11:34)        ROS  General: Denies rigors, nightsweats  HEENT: Denies headache, rhinorrhea, sore throat, eye pain  CV: Denies CP, palpitations  PULM: Denies wheezing, hemoptysis  GI: Denies hematemesis, hematochezia, melena  : Denies discharge, hematuria  MSK: Denies arthralgias, myalgias  SKIN: Denies rash, lesions  NEURO: Denies paresthesias, weakness  PSYCH: Denies depression, anxiety    VITALS:  T(F): 99.7, Max: 99.7 (21 @ 09:50)  HR: 71  BP: 98/54  RR: 15Vital Signs Last 24 Hrs  T(C): 37.6 (2021 09:50), Max: 37.6 (2021 09:50)  T(F): 99.7 (2021 09:50), Max: 99.7 (2021 09:50)  HR: 71 (2021 12:30) (70 - 90)  BP: 98/54 (2021 12:30) (97/55 - 148/67)  BP(mean): --  RR: 15 (2021 12:30) (12 - 20)  SpO2: 97% (2021 12:30) (96% - 98%)    PHYSICAL EXAM:  Gen: NAD, resting in bed  HEENT: Normocephalic, atraumatic  Neck: supple, no lymphadenopathy  CV: Regular rate & regular rhythm  Lungs: decreased BS at bases, no fremitus  Abdomen: Soft, BS present  Ext: Warm, well perfused  Neuro: non focal, awake  Skin: no rash, no erythema  Lines: no phlebitis    TESTS & MEASUREMENTS:                        9.8    16.84 )-----------( 295      ( 2021 22:15 )             32.3         135  |  100  |  22<H>  ----------------------------<  249<H>  4.5   |  24  |  1.0    Ca    9.3      2021 22:15    TPro  8.2<H>  /  Alb  4.4  /  TBili  1.1  /  DBili  x   /  AST  26  /  ALT  30  /  AlkPhos  127<H>      eGFR if Non African American: 80 mL/min/1.73M2 (21 @ 22:15)  eGFR if : 92 mL/min/1.73M2 (21 @ 22:15)    LIVER FUNCTIONS - ( 2021 22:15 )  Alb: 4.4 g/dL / Pro: 8.2 g/dL / ALK PHOS: 127 U/L / ALT: 30 U/L / AST: 26 U/L / GGT: x           Urinalysis Basic - ( 2021 00:31 )    Color: Light Yellow / Appearance: Clear / S.029 / pH: x  Gluc: x / Ketone: Negative  / Bili: Negative / Urobili: <2 mg/dL   Blood: x / Protein: 30 mg/dL / Nitrite: Negative   Leuk Esterase: Negative / RBC: 2 /HPF / WBC 1 /HPF   Sq Epi: x / Non Sq Epi: 0 /HPF / Bacteria: Negative          C  Lactate, Blood: 1.4 mmol/L (21 @ 22:15)      INFECTIOUS DISEASES TESTING  COVID-19 PCR: NotDetec (21 @ 06:00)  COVID-19 PCR: NotDetec (21 @ 11:00)  COVID-19 PCR: Detected (21 @ 13:47)  COVID-19 PCR: NotDetec (10-13-20 @ 09:08)  COVID-19 PCR: NotDetec (10-03-20 @ 19:54)  COVID-19 PCR: NotDetec (20 @ 20:40)  COVID-19 PCR: NotDetec (20 @ 07:43)  COVID-19 PCR: NotDetec (20 @ 19:46)  COVID-19 PCR: NotDetec (20 @ 10:18)      RADIOLOGY & ADDITIONAL TESTS:  I have personally reviewed the last Chest xray  CXR      CT  CT Abdomen and Pelvis w/ IV Cont:   EXAM:  CT ABDOMEN AND PELVIS IC            PROCEDURE DATE:  2021            INTERPRETATION:  CLINICAL STATEMENT: Post pain. Drainage catheter removal. Right total knee arthroplasty status post explantation and antibiotic spacer placement.    TECHNIQUE: Contiguous axial CT images were obtained from the lower chest to the pubic symphysis following administration of 100cc Optiray 320 intravenous contrast.  Oral contrast was not administered.  Reformatted images in the coronal and sagittal planes were acquired.    Comparison made with CT abdomen and pelvis 2021.    FINDINGS:    LOWER CHEST: Pacemaker.    HEPATOBILIARY: Gallbladder not visualized. No biliary dilation. Liver unremarkable.    SPLEEN: Unremarkable.    PANCREAS: Unremarkable.    ADRENAL GLANDS: Unremarkable.    KIDNEYS: Unchanged mildly dilated left renal collecting systems. Nonobstructing left renal calculi measuring up to 0.4 cm. No right hydronephrosis.    ABDOMINOPELVIC NODES: Unremarkable.    PELVIC ORGANS: Unremarkable.    PERITONEUM/MESENTERY/BOWEL: Unremarkable.    BONES/SOFT TISSUES: Decreased area of right upper quadrant subcutaneous stranding at site of prior percutaneous cholecystostomy, without evidence of abscess. Right hip gamma nail fixationwith heterotrophic ossification. Degenerative change of lumbar spine.    OTHER: Vascular calcifications.      IMPRESSION:  Since 2021:    1. No evidence of acute intra-abdominal pathology.    2. Decreased area of right upper quadrant subcutaneous stranding at site of prior percutaneous cholecystostomy, without evidence of abscess.              MARIE CHASE MD; Attending Radiologist  This document has been electronically signed. 2021  4:52AM (21 @ 04:42)      CARDIOLOGY TESTING      MEDICATIONS  aspirin enteric coated 81 Oral daily  atorvastatin 80 Oral at bedtime  ceFAZolin   IVPB 2000 IV Intermittent every 8 hours  chlorhexidine 4% Liquid 1 Topical <User Schedule>  digoxin     Tablet 125 Oral daily  DULoxetine 90 Oral daily  enoxaparin Injectable 40 SubCutaneous daily  insulin aspart (NovoLOG) Pump - Peds 1 SubCutaneous Continuous Pump  lactated ringers. 1000 IV Continuous <Continuous>  metoprolol succinate ER 25 Oral daily  pantoprazole    Tablet 40 Oral before breakfast  prasugrel 10 Oral daily  spironolactone 25 Oral daily      Weight  Weight (kg): 80.7 (21 @ 08:04)    ANTIBIOTICS:  ceFAZolin   IVPB 2000 milliGRAM(s) IV Intermittent every 8 hours      ALLERGIES:  ACE inhibitors (Hives)  carvedilol (Other)  enalapril (Hives)  Entresto (Other)      ASSESSMENT  65y M admitted with KNEE PAIN LEFT INABILITY TO WALK LEUKOCYTOSIS CHRONIC ANEMIA CHRONIC PAIN      Status post percutaneous transluminal coronary angioplasty    Atherosclerosis of coronary artery    Osteoarthritis    HLD (hyperlipidemia)    Blood clot due to device, implant, or graft    Diabetes    HTN (hypertension)    CHF (congestive heart failure)    History of celiac disease    Diverticulitis    STEMI (ST elevation myocardial infarction)    Diabetic neuropathy    Anxiety and depression        IMPRESSION  #  #Severe Sepsis on admission T<96.8F, T>101F, Pulse>90, Resp Rate>20, WBC>12, wbc<4, Bands>10%, lactic acidosis, metabolic encephalopathy, metabolic acidosis, metabolic alkalosis, MOISES due to suspected Gram negative pneumonia, aspiration pneumonia, pyelonephritis, cystitis, cellulitis, bacteremia  Pt has an acute illness which poses threat to bodily function   #Lactic acidosis  #Hyponatremia   #Obesity BMI (kg/m2): 23.5, 22.2  #DM   #Abx allergy: carvedilol (Other)  enalapril (Hives)  Entresto (Other)        RECOMMENDATIONS  - F/u blood cultures, urine culture, wound culture  - Continue  - Continue  - Please check vanc trough 30 min prior to the 4th dose     Spectra [id number]       FLOWER MARISCAL  65y, Male  Allergy: ACE inhibitors (Hives)  carvedilol (Other)  enalapril (Hives)  Entresto (Other)      CHIEF COMPLAINT: Septic arthritis (2021 11:34)      LOS      HPI:  65 year old male with PMH of CAD s/p MI, PCI/CABG, CHFrEF (15-20%), has ICD, HTN, celiac disease, DM, neuropathy, chronic b/l LE ulcers presents, severe chronic pain,  hx infected R TKR with MRSA (was eventually cemented so has limited ROM R knee), and history of cholecystostomy tube placement in 2020 for acute cholecystitis s/p removal in May 2020 with multiple presentations including persistent bilious drainage from the prior tract site as well as a RUQ abdominal abscess at the site s/p drainage. He presented to ED with inability to walk/ambulate 2/2 left knee pain. Pt has hx of knee pain and infections. 2 weeks ago pt received a 'steroid' shot for his left knee for pain. 2 days later, his knee began to swell and he was unable to walk or bend the knee. It progressively gotten worse to the point where the pain was unbearable so he presented to the ED.     Pt endorses a weight loss >70 lbs over the last year, there is bilious drainage coming from where he had a prior per sudhir, jaundice in the finger tips and sclera. Recent admission for uncontrollable movements, no diagnosis given. Pt denies f/c/n/v, chest pain, headaches, UTI symptoms     In the ED:  Joint was aspirated cloudy yellow fluid was seen and sent for cultures. Neutrophil count >50,000 and RBC >8000. Ortho consulted for septic arthritis, s/p arthroscopic drainage. Surg consulted for bile drainage, HIDA scan, consult GI, possible ERCP. CT A&P, No evidence of acute intra-abdominal pathology. Decreased area of right upper quadrant subcutaneous stranding at site of prior percutaneous cholecystostomy, without evidence of abscess. Vitally stable. Admitted to medicine for medical management.    The patient is being followed by Neurology, and he is followed by Cardiology Dr. Lopez in  and Dr. Lilia Caldera in [facility], also by Endo Dr. Cormier at [facility].     (2021 11:34)      INFECTIOUS DISEASE HISTORY:  History as above.   Hx of Recurrent right kkne PJI- MRSA from 2019  Completed 6 week course of vancomycin/rifampin with antibiotic spacer placed in 2020  COurse complicated with RUQ intradermal abscess that grew VRE faecium and Pseudomonas.   He has completed additional course of daptomycin/cefepime (per previous ID notes, ended 10/29/2020).    He reports worsening left knee pain 2-3 days prior to admission. He recently received steroid knee infection to his left knee about 2-3 weeks ago. He denies any fevers, chills, nausea, vomiting, at home. He is now unable to ambulated secondary to pain.   He was also noted to have bileious drainage from prior perc sudhir site.   CT Abd/Pelvis with right upper quadrante subcutaneous stranding at site of prior      PAST MEDICAL & SURGICAL HISTORY:  Status post percutaneous transluminal coronary angioplasty  2 stents    Atherosclerosis of coronary artery  CAD (coronary artery disease)    Osteoarthritis    HLD (hyperlipidemia)    Blood clot due to device, implant, or graft  was on blood thinners    Diabetes  on insulin pump    HTN (hypertension)    CHF (congestive heart failure)  EF ~ 25%    History of celiac disease    Diverticulitis    STEMI (ST elevation myocardial infarction)    Diabetic neuropathy    Anxiety and depression    Other postprocedural status  Fixation hardware in foot LEFT    Stented coronary artery  10/18 heart attack  INFERIOR WALL MI    S/P CABG x 1  2018    S/P TKR (total knee replacement), right  with infection Mrsa   per pt he was cleared from MRSA infection    Surgery, elective  right knee wound debridement    History of open reduction and internal fixation (ORIF) procedure    H/O shoulder surgery  right    AICD (automatic cardioverter/defibrillator) present    Cholecystostomy care  drain inserted  &amp; removed 4 months ago    History of tonsillectomy    History of hip replacement, total, right        FAMILY HISTORY  Family history of heart disease (Mother)    Family history of heart disease (Mother)    Family history of allergies        SOCIAL HISTORY  Social History:  denies smoking/etoh/illicit drug use (2021 11:34)        ROS  General: Denies rigors, nightsweats  HEENT: Denies headache, rhinorrhea, sore throat, eye pain  CV: Denies CP, palpitations  PULM: Denies wheezing, hemoptysis  GI: Denies hematemesis, hematochezia, melena  : Denies discharge, hematuria  MSK: Denies arthralgias, myalgias  SKIN: Denies rash, lesions  NEURO: Denies paresthesias, weakness  PSYCH: Denies depression, anxiety    VITALS:  T(F): 99.7, Max: 99.7 (21 @ 09:50)  HR: 71  BP: 98/54  RR: 15Vital Signs Last 24 Hrs  T(C): 37.6 (2021 09:50), Max: 37.6 (2021 09:50)  T(F): 99.7 (2021 09:50), Max: 99.7 (2021 09:50)  HR: 71 (2021 12:30) (70 - 90)  BP: 98/54 (2021 12:30) (97/55 - 148/67)  BP(mean): --  RR: 15 (2021 12:30) (12 - 20)  SpO2: 97% (2021 12:30) (96% - 98%)    PHYSICAL EXAM:  Gen: NAD, resting in bed  HEENT: Normocephalic, atraumatic  Neck: supple, no lymphadenopathy  CV: Regular rate & regular rhythm  Lungs: decreased BS at bases, no fremitus  Abdomen: Soft, BS present  Ext:right knee dressed with drain   Neuro: non focal, awake  Skin: no rash, no erythema  Lines: no phlebitis    TESTS & MEASUREMENTS:                        9.8    16.84 )-----------( 295      ( 2021 22:15 )             32.3         135  |  100  |  22<H>  ----------------------------<  249<H>  4.5   |  24  |  1.0    Ca    9.3      2021 22:15    TPro  8.2<H>  /  Alb  4.4  /  TBili  1.1  /  DBili  x   /  AST  26  /  ALT  30  /  AlkPhos  127<H>      eGFR if Non African American: 80 mL/min/1.73M2 (21 @ 22:15)  eGFR if : 92 mL/min/1.73M2 (21 @ 22:15)    LIVER FUNCTIONS - ( 2021 22:15 )  Alb: 4.4 g/dL / Pro: 8.2 g/dL / ALK PHOS: 127 U/L / ALT: 30 U/L / AST: 26 U/L / GGT: x           Urinalysis Basic - ( 2021 00:31 )    Color: Light Yellow / Appearance: Clear / S.029 / pH: x  Gluc: x / Ketone: Negative  / Bili: Negative / Urobili: <2 mg/dL   Blood: x / Protein: 30 mg/dL / Nitrite: Negative   Leuk Esterase: Negative / RBC: 2 /HPF / WBC 1 /HPF   Sq Epi: x / Non Sq Epi: 0 /HPF / Bacteria: Negative          C  Lactate, Blood: 1.4 mmol/L (21 @ 22:15)      INFECTIOUS DISEASES TESTING  COVID-19 PCR: NotDetec (21 @ 06:00)  COVID-19 PCR: NotDetec (21 @ 11:00)  COVID-19 PCR: Detected (21 @ 13:47)  COVID-19 PCR: NotDetec (10-13-20 @ 09:08)  COVID-19 PCR: NotDetec (10-03-20 @ 19:54)  COVID-19 PCR: NotDetec (20 @ 20:40)  COVID-19 PCR: NotDetec (20 @ 07:43)  COVID-19 PCR: NotDetec (20 @ 19:46)  COVID-19 PCR: NotDetec (20 @ 10:18)      RADIOLOGY & ADDITIONAL TESTS:  I have personally reviewed the last Chest xray  CXR      CT  CT Abdomen and Pelvis w/ IV Cont:   EXAM:  CT ABDOMEN AND PELVIS IC            PROCEDURE DATE:  2021            INTERPRETATION:  CLINICAL STATEMENT: Post pain. Drainage catheter removal. Right total knee arthroplasty status post explantation and antibiotic spacer placement.    TECHNIQUE: Contiguous axial CT images were obtained from the lower chest to the pubic symphysis following administration of 100cc Optiray 320 intravenous contrast.  Oral contrast was not administered.  Reformatted images in the coronal and sagittal planes were acquired.    Comparison made with CT abdomen and pelvis 2021.    FINDINGS:    LOWER CHEST: Pacemaker.    HEPATOBILIARY: Gallbladder not visualized. No biliary dilation. Liver unremarkable.    SPLEEN: Unremarkable.    PANCREAS: Unremarkable.    ADRENAL GLANDS: Unremarkable.    KIDNEYS: Unchanged mildly dilated left renal collecting systems. Nonobstructing left renal calculi measuring up to 0.4 cm. No right hydronephrosis.    ABDOMINOPELVIC NODES: Unremarkable.    PELVIC ORGANS: Unremarkable.    PERITONEUM/MESENTERY/BOWEL: Unremarkable.    BONES/SOFT TISSUES: Decreased area of right upper quadrant subcutaneous stranding at site of prior percutaneous cholecystostomy, without evidence of abscess. Right hip gamma nail fixationwith heterotrophic ossification. Degenerative change of lumbar spine.    OTHER: Vascular calcifications.      IMPRESSION:  Since 2021:    1. No evidence of acute intra-abdominal pathology.    2. Decreased area of right upper quadrant subcutaneous stranding at site of prior percutaneous cholecystostomy, without evidence of abscess.              MARIE CHASE MD; Attending Radiologist  This document has been electronically signed. 2021  4:52AM (21 @ 04:42)      CARDIOLOGY TESTING      MEDICATIONS  aspirin enteric coated 81 Oral daily  atorvastatin 80 Oral at bedtime  ceFAZolin   IVPB 2000 IV Intermittent every 8 hours  chlorhexidine 4% Liquid 1 Topical <User Schedule>  digoxin     Tablet 125 Oral daily  DULoxetine 90 Oral daily  enoxaparin Injectable 40 SubCutaneous daily  insulin aspart (NovoLOG) Pump - Peds 1 SubCutaneous Continuous Pump  lactated ringers. 1000 IV Continuous <Continuous>  metoprolol succinate ER 25 Oral daily  pantoprazole    Tablet 40 Oral before breakfast  prasugrel 10 Oral daily  spironolactone 25 Oral daily      Weight  Weight (kg): 80.7 (21 @ 08:04)    ANTIBIOTICS:  ceFAZolin   IVPB 2000 milliGRAM(s) IV Intermittent every 8 hours      ALLERGIES:  ACE inhibitors (Hives)  carvedilol (Other)  enalapril (Hives)  Entresto (Other)      ASSESSMENT  65 year old male with PMH of CAD s/p MI, PCI/CABG, CHFrEF (15-20%), has ICD, HTN, celiac disease, DM, neuropathy, chronic b/l LE ulcers presents, severe chronic pain,  hx infected R TKR with MRSA (was eventually cemented so has limited ROM R knee), and history of cholecystostomy tube placement in 2020 for acute cholecystitis s/p removal in May 2020 with multiple presentations including persistent bilious drainage from the prior tract site as well as a RUQ abdominal abscess at the site s/p drainage who presents with left knee pain    IMPRESSION  #Septic Arthritis of left knee  - s/p arthrocentesis of left knee - consistent with baterial septic infection   - s/p OR washout  -- purulent fluid in left knee with significant tricompartmental arthritis     #Biliary Drainage from previous perc sudhir site  - Previous Intraabdominal Cx 2020 -- VRE faecium and pseudomonas aeruginosa  - CT Abdomen and Pelvis w/ IV Cont (21 @ 04:42): No evidence of acute intra-abdominal pathology. Decreased area of right upper quadrant subcutaneous stranding at site of prior percutaneous cholecystostomy, without evidence of abscess.    #PJI of RIght knee  - Hx of Recurrent right kkne PJI- MRSA from 2019; Completed 6 week course of vancomycin/rifampin with antibiotic spacer placed in 2020  - He has completed additional course of daptomycin/cefepime (per previous ID notes, ended 10/29/2020).    #CHF s/p ICD  #DM   #Abx allergy: carvedilol (Other)  enalapril (Hives)  Entresto (Other)    Creatinine, Serum: 1.0 mg/dL (21 @ 22:15)    RECOMMENDATIONS  - follow-up OR cultures from   - Surgery following for Biliary drainage -- recommended for HIDA scan  - stop cefazolin -- change to daptomycin 8 mg/kg q 24 hours and cefepime 2g q 8 hours to cover previous VRE faecium and pseudomonas   - local wound care  Please call or message on Microsoft Teams if with any questions.  Spectra [phone]

## 2021-04-26 NOTE — PATIENT PROFILE ADULT - NSPROIMPLANTSMEDDEV_GEN_A_NUR
Automatic Implantable Cardioverter Defibrillator/Artificial joint/Insulin pump/Vascular stents/Clips

## 2021-04-26 NOTE — CONSULT NOTE ADULT - ASSESSMENT
65 year old male with PMH of CAD s/p MI, PCI/CABG, CHFrEF (15-20%), has ICD, HTN, celiac disease, DM, neuropathy, chronic b/l LE ulcers presents, severe chronic pain, and history of cholecystostomy tube placement in February 2020 for acute cholecystitis s/p removal in May 2020 with multiple presentations including persistent bilious drainage from the prior tract site as well as a RUQ abdominal abscess at the site s/p drainage. Patient is admitted for septic arthritis of the left knee s/p drainage. Surgery consulted for persistent bilious drainage from the prior cholecystostomy tube site.     Plan:   -Consult IR for fistulogram to evaluate the tract of the prior cholecystostomy tube   -Recommend HIDA scan  -GI consult for possible ERCP with stent to decrease bile leak from the tract   -Plan discussed with Dr. Colon

## 2021-04-26 NOTE — H&P ADULT - ATTENDING COMMENTS
Pt is a 65 year old male with PMH of CAD s/p MI, PCI/CABG, CHFrEF (15-20%), has ICD, HTN, celiac disease, DM, neuropathy, chronic b/l LE ulcers presents, severe chronic pain,  hx infected R TKR with MRSA (was eventually cemented so has limited ROM R knee), and history of cholecystostomy tube placement in February 2020 for acute cholecystitis s/p removal in May 2020 with multiple presentations including persistent bilious drainage from the prior tract site as well as a RUQ abdominal abscess at the site s/p drainage.     Now presenting w/ inability to walk/ambulate 2/2 left knee pain. 2 weeks ago pt received a 'steroid' shot for his left knee for pain and 2 days later, his knee began to swell and he was unable to walk or bend the knee. Discomfort has been progressively worse, prompting ER presentation. In the ER, Joint was aspirated and cloudy yellow fluid was seen and sent for cultures. Neutrophil count >50,000 and RBC >8000. Ortho consulted for septic arthritis, now s/p arthroscopic drainage. General Surgery consulted for bile drainage. CT A&P w/ decreased stranding at RUQ, and no abscess noted.     Physical Exam:   General: NAD, lethargic post op   HEENT: PERRLA, EOMI, moist mucous membranes  Neurology: A&Ox3, nonfocal  Respiratory: CTA B/L, normal respiratory effort, no wheezes, crackles, rales  CV: RRR, no M/R/G  Abdominal: Soft, NT, ND active bilious drainage of wound in the RUQ of abdomen  Extremities: No edema, + peripheral pulses      Assessment and Plan:     # Septic arthritis  - s/p steroid injection OP   - Knee aspirated 04/26: cloudy, yellow, CPPD crystals present, neutrophils >50K, RBC >8k  - s/p I&D by ortho 04/26  - ID following: change to daptomycin 8 mg/kg q 24 hours and cefepime 2g q 8 hours to cover previous VRE faecium and pseudomonas   - Ortho following   - f/u bcx, knee aspirate cx    # Bilious Drainage from abdominal wound  -  cholecystostomy tube placement in February 2020 for acute cholecystitis s/p removal in May 2020 with multiple presentations including persistent bilious drainage from the prior tract site as well as a RUQ abdominal abscess at the site s/p drainage  - CT A/P w/ decreased RUQ stranding, no abscess   General Surgery consulted:   -Consult IR for fistulogram to evaluate the tract of the prior cholecystostomy tube   -Recommend HIDA scan  -GI consult for possible ERCP with stent to decrease bile leak from the tract   ** Patient refusing HIDA, and refusing further intervention currently since he follows up at [facility], will re-discuss w/ patient when he is more alert     # Cachexia  - pt endorses losing 70+ Ilbs in the last year and a half  - BMI 23.5  - consult nutrition   - likely multifactorial in the setting of chronic disease     #CAD s/p 2 stent PCI that thrombosed, s/p CABG  #CHFrEF EF 15% s/p AICD  #HLD  - hx of 2 thrombosed stents/MI now s/p CABG   - TTE 10/20: G2DD, EF 15-20%  - has AICD  - Cardiology consulted: cont aspirin, prasugrel, digoxin, toprol, rosuvastatin, aldactone  - f/u TTE    #Choreiform Movement Disorder - Etiology Unclear   - Neurology consulted during previous admission:   ddx: sporadic paroxysmal non-kinesogenic choreoathetosis vs celiac-related progressive chorea   - c/w Clonazepam 0.5mg TID   - f/u as o/p with neuro    # LE Neuropathy   - continue Duloxetine     # Microcytic anemia likely anemia of chronic dx- iron studies, trend hgb    # DM2- cont insulin pump         DVT ppx: lovenox     FULL CODE     #Progress Note Handoff:  Pending (specify): tx of septic arthritis   Disposition: Home___/SNF___/Other________/Unknown at this time___x_____      Krysten Ohara,

## 2021-04-26 NOTE — ED PROVIDER NOTE - CARE PLAN
Principal Discharge DX:	Knee pain, left  Secondary Diagnosis:	Inability to walk  Secondary Diagnosis:	Leukocytosis  Secondary Diagnosis:	Chronic anemia  Secondary Diagnosis:	Chronic pain

## 2021-04-26 NOTE — ED PROVIDER NOTE - PMH
Anxiety and depression    Atherosclerosis of coronary artery  CAD (coronary artery disease)  Blood clot due to device, implant, or graft  was on blood thinners  CHF (congestive heart failure)  EF ~ 25%  Diabetes  on insulin pump  Diabetic neuropathy    Diverticulitis    History of celiac disease    HLD (hyperlipidemia)    HTN (hypertension)    Osteoarthritis    Status post percutaneous transluminal coronary angioplasty  2 stents  STEMI (ST elevation myocardial infarction)

## 2021-04-26 NOTE — ED PROVIDER NOTE - ATTENDING CONTRIBUTION TO CARE
Patient presents to ED for evaluation of LT knee pain, patient with h/o chronic arthritis, walks with walker, recent hospitalization and abd surgery, denies trauma.   Vitals reviewed.   Lungs: CTA  abd: +BS, +RT sided abd surgical dressing, ND, soft,   RT knee with chronic changes from previous knee surgery.   LT knee has chronic appearing swelling from arthritis, not hot to touch, no crepitus, pain on ROM of the knee, distally NVI.   A/P: Acute on Chronic knee pain,   labs, imaging, ortho consult,   reevaluation.

## 2021-04-27 LAB
ALBUMIN SERPL ELPH-MCNC: 3.5 G/DL — SIGNIFICANT CHANGE UP (ref 3.5–5.2)
ALP SERPL-CCNC: 129 U/L — HIGH (ref 30–115)
ALT FLD-CCNC: 32 U/L — SIGNIFICANT CHANGE UP (ref 0–41)
ANION GAP SERPL CALC-SCNC: 13 MMOL/L — SIGNIFICANT CHANGE UP (ref 7–14)
AST SERPL-CCNC: 22 U/L — SIGNIFICANT CHANGE UP (ref 0–41)
BASOPHILS # BLD AUTO: 0.03 K/UL — SIGNIFICANT CHANGE UP (ref 0–0.2)
BASOPHILS # BLD AUTO: 0.11 K/UL
BASOPHILS NFR BLD AUTO: 0.2 % — SIGNIFICANT CHANGE UP (ref 0–1)
BASOPHILS NFR BLD AUTO: 1.1 %
BILIRUB DIRECT SERPL-MCNC: 0.4 MG/DL — HIGH (ref 0–0.2)
BILIRUB INDIRECT FLD-MCNC: 0.8 MG/DL — SIGNIFICANT CHANGE UP (ref 0.2–1.2)
BILIRUB SERPL-MCNC: 1.2 MG/DL — SIGNIFICANT CHANGE UP (ref 0.2–1.2)
BUN SERPL-MCNC: 25 MG/DL — HIGH (ref 10–20)
CALCIUM SERPL-MCNC: 9 MG/DL — SIGNIFICANT CHANGE UP (ref 8.5–10.1)
CHLORIDE SERPL-SCNC: 99 MMOL/L — SIGNIFICANT CHANGE UP (ref 98–110)
CO2 SERPL-SCNC: 21 MMOL/L — SIGNIFICANT CHANGE UP (ref 17–32)
COVID-19 SPIKE DOMAIN AB INTERP: POSITIVE
COVID-19 SPIKE DOMAIN ANTIBODY RESULT: >250 U/ML — HIGH
CREAT SERPL-MCNC: 1 MG/DL — SIGNIFICANT CHANGE UP (ref 0.7–1.5)
CRP SERPL-MCNC: <3 MG/L
CULTURE RESULTS: SIGNIFICANT CHANGE UP
DIGOXIN SERPL-MCNC: 0.4 NG/ML — LOW (ref 0.8–2)
EOSINOPHIL # BLD AUTO: 0.07 K/UL — SIGNIFICANT CHANGE UP (ref 0–0.7)
EOSINOPHIL # BLD AUTO: 0.25 K/UL
EOSINOPHIL NFR BLD AUTO: 0.5 % — SIGNIFICANT CHANGE UP (ref 0–8)
EOSINOPHIL NFR BLD AUTO: 2.5 %
ERYTHROCYTE [SEDIMENTATION RATE] IN BLOOD BY WESTERGREN METHOD: 73 MM/HR
FERRITIN SERPL-MCNC: 121 NG/ML — SIGNIFICANT CHANGE UP (ref 30–400)
GLUCOSE SERPL-MCNC: 214 MG/DL — HIGH (ref 70–99)
GRAM STN FLD: SIGNIFICANT CHANGE UP
HCT VFR BLD CALC: 30.3 % — LOW (ref 42–52)
HCT VFR BLD CALC: 32.4 %
HGB BLD-MCNC: 9.2 G/DL — LOW (ref 14–18)
HGB BLD-MCNC: 9.5 G/DL
IMM GRANULOCYTES NFR BLD AUTO: 0.3 %
IMM GRANULOCYTES NFR BLD AUTO: 0.4 % — HIGH (ref 0.1–0.3)
IRON SATN MFR SERPL: 13 UG/DL — LOW (ref 35–150)
IRON SATN MFR SERPL: 4 % — LOW (ref 15–50)
LDH SERPL L TO P-CCNC: 169 U/L — SIGNIFICANT CHANGE UP (ref 50–242)
LYMPHOCYTES # BLD AUTO: 0.57 K/UL — LOW (ref 1.2–3.4)
LYMPHOCYTES # BLD AUTO: 1.14 K/UL
LYMPHOCYTES # BLD AUTO: 3.8 % — LOW (ref 20.5–51.1)
LYMPHOCYTES NFR BLD AUTO: 11.2 %
MAGNESIUM SERPL-MCNC: 2 MG/DL — SIGNIFICANT CHANGE UP (ref 1.8–2.4)
MAN DIFF?: NORMAL
MCHC RBC-ENTMCNC: 21.5 PG — LOW (ref 27–31)
MCHC RBC-ENTMCNC: 21.9 PG
MCHC RBC-ENTMCNC: 29.3 GM/DL
MCHC RBC-ENTMCNC: 30.4 G/DL — LOW (ref 32–37)
MCV RBC AUTO: 70.8 FL — LOW (ref 80–94)
MCV RBC AUTO: 74.7 FL
METHOD TYPE: SIGNIFICANT CHANGE UP
MONOCYTES # BLD AUTO: 1.05 K/UL
MONOCYTES # BLD AUTO: 1.42 K/UL — HIGH (ref 0.1–0.6)
MONOCYTES NFR BLD AUTO: 10.4 %
MONOCYTES NFR BLD AUTO: 9.4 % — HIGH (ref 1.7–9.3)
MSSA DNA SPEC QL NAA+PROBE: SIGNIFICANT CHANGE UP
NEUTROPHILS # BLD AUTO: 13 K/UL — HIGH (ref 1.4–6.5)
NEUTROPHILS # BLD AUTO: 7.56 K/UL
NEUTROPHILS NFR BLD AUTO: 74.5 %
NEUTROPHILS NFR BLD AUTO: 85.7 % — HIGH (ref 42.2–75.2)
NRBC # BLD: 0 /100 WBCS — SIGNIFICANT CHANGE UP (ref 0–0)
PHOSPHATE SERPL-MCNC: 2.9 MG/DL — SIGNIFICANT CHANGE UP (ref 2.1–4.9)
PLATELET # BLD AUTO: 275 K/UL — SIGNIFICANT CHANGE UP (ref 130–400)
PLATELET # BLD AUTO: 375 K/UL
POTASSIUM SERPL-MCNC: 4.8 MMOL/L — SIGNIFICANT CHANGE UP (ref 3.5–5)
POTASSIUM SERPL-SCNC: 4.8 MMOL/L — SIGNIFICANT CHANGE UP (ref 3.5–5)
PROT SERPL-MCNC: 6.8 G/DL — SIGNIFICANT CHANGE UP (ref 6–8)
RBC # BLD: 4.28 M/UL — LOW (ref 4.7–6.1)
RBC # BLD: 4.34 M/UL
RBC # FLD: 18.8 % — HIGH (ref 11.5–14.5)
RBC # FLD: 20 %
SARS-COV-2 IGG+IGM SERPL QL IA: >250 U/ML — HIGH
SARS-COV-2 IGG+IGM SERPL QL IA: POSITIVE
SODIUM SERPL-SCNC: 133 MMOL/L — LOW (ref 135–146)
SPECIMEN SOURCE: SIGNIFICANT CHANGE UP
TIBC SERPL-MCNC: 291 UG/DL — SIGNIFICANT CHANGE UP (ref 220–430)
TRANSFERRIN SERPL-MCNC: 243 MG/DL — SIGNIFICANT CHANGE UP (ref 200–360)
UIBC SERPL-MCNC: 278 UG/DL — SIGNIFICANT CHANGE UP (ref 110–370)
WBC # BLD: 15.15 K/UL — HIGH (ref 4.8–10.8)
WBC # FLD AUTO: 10.14 K/UL
WBC # FLD AUTO: 15.15 K/UL — HIGH (ref 4.8–10.8)

## 2021-04-27 PROCEDURE — 93306 TTE W/DOPPLER COMPLETE: CPT | Mod: 26

## 2021-04-27 PROCEDURE — 93010 ELECTROCARDIOGRAM REPORT: CPT

## 2021-04-27 PROCEDURE — 99233 SBSQ HOSP IP/OBS HIGH 50: CPT

## 2021-04-27 RX ORDER — NAFCILLIN 10 G/100ML
2 INJECTION, POWDER, FOR SOLUTION INTRAVENOUS EVERY 4 HOURS
Refills: 0 | Status: DISCONTINUED | OUTPATIENT
Start: 2021-04-27 | End: 2021-05-27

## 2021-04-27 RX ORDER — NAFCILLIN 10 G/100ML
INJECTION, POWDER, FOR SOLUTION INTRAVENOUS
Refills: 0 | Status: DISCONTINUED | OUTPATIENT
Start: 2021-04-27 | End: 2021-05-27

## 2021-04-27 RX ORDER — ONDANSETRON 8 MG/1
4 TABLET, FILM COATED ORAL ONCE
Refills: 0 | Status: COMPLETED | OUTPATIENT
Start: 2021-04-27 | End: 2021-04-27

## 2021-04-27 RX ORDER — NAFCILLIN 10 G/100ML
2 INJECTION, POWDER, FOR SOLUTION INTRAVENOUS ONCE
Refills: 0 | Status: COMPLETED | OUTPATIENT
Start: 2021-04-27 | End: 2021-04-27

## 2021-04-27 RX ADMIN — ATORVASTATIN CALCIUM 80 MILLIGRAM(S): 80 TABLET, FILM COATED ORAL at 21:23

## 2021-04-27 RX ADMIN — Medication 81 MILLIGRAM(S): at 12:45

## 2021-04-27 RX ADMIN — Medication 25 MILLIGRAM(S): at 05:23

## 2021-04-27 RX ADMIN — Medication 0.5 MILLIGRAM(S): at 21:22

## 2021-04-27 RX ADMIN — Medication 125 MICROGRAM(S): at 05:23

## 2021-04-27 RX ADMIN — PRASUGREL 10 MILLIGRAM(S): 5 TABLET, FILM COATED ORAL at 12:47

## 2021-04-27 RX ADMIN — MORPHINE SULFATE 4 MILLIGRAM(S): 50 CAPSULE, EXTENDED RELEASE ORAL at 16:06

## 2021-04-27 RX ADMIN — MORPHINE SULFATE 4 MILLIGRAM(S): 50 CAPSULE, EXTENDED RELEASE ORAL at 21:50

## 2021-04-27 RX ADMIN — MORPHINE SULFATE 4 MILLIGRAM(S): 50 CAPSULE, EXTENDED RELEASE ORAL at 06:21

## 2021-04-27 RX ADMIN — CHLORHEXIDINE GLUCONATE 1 APPLICATION(S): 213 SOLUTION TOPICAL at 05:23

## 2021-04-27 RX ADMIN — DULOXETINE HYDROCHLORIDE 90 MILLIGRAM(S): 30 CAPSULE, DELAYED RELEASE ORAL at 14:39

## 2021-04-27 RX ADMIN — Medication 650 MILLIGRAM(S): at 11:22

## 2021-04-27 RX ADMIN — Medication 650 MILLIGRAM(S): at 05:24

## 2021-04-27 RX ADMIN — ENOXAPARIN SODIUM 40 MILLIGRAM(S): 100 INJECTION SUBCUTANEOUS at 11:22

## 2021-04-27 RX ADMIN — Medication 650 MILLIGRAM(S): at 06:30

## 2021-04-27 RX ADMIN — MORPHINE SULFATE 4 MILLIGRAM(S): 50 CAPSULE, EXTENDED RELEASE ORAL at 21:22

## 2021-04-27 RX ADMIN — SPIRONOLACTONE 25 MILLIGRAM(S): 25 TABLET, FILM COATED ORAL at 05:23

## 2021-04-27 RX ADMIN — NAFCILLIN 200 GRAM(S): 10 INJECTION, POWDER, FOR SOLUTION INTRAVENOUS at 21:22

## 2021-04-27 RX ADMIN — PANTOPRAZOLE SODIUM 40 MILLIGRAM(S): 20 TABLET, DELAYED RELEASE ORAL at 05:23

## 2021-04-27 RX ADMIN — NAFCILLIN 200 GRAM(S): 10 INJECTION, POWDER, FOR SOLUTION INTRAVENOUS at 16:00

## 2021-04-27 RX ADMIN — CEFEPIME 100 MILLIGRAM(S): 1 INJECTION, POWDER, FOR SOLUTION INTRAMUSCULAR; INTRAVENOUS at 05:23

## 2021-04-27 RX ADMIN — MORPHINE SULFATE 4 MILLIGRAM(S): 50 CAPSULE, EXTENDED RELEASE ORAL at 07:00

## 2021-04-27 RX ADMIN — MORPHINE SULFATE 4 MILLIGRAM(S): 50 CAPSULE, EXTENDED RELEASE ORAL at 11:27

## 2021-04-27 RX ADMIN — NAFCILLIN 200 GRAM(S): 10 INJECTION, POWDER, FOR SOLUTION INTRAVENOUS at 18:05

## 2021-04-27 RX ADMIN — Medication 0.5 MILLIGRAM(S): at 11:23

## 2021-04-27 RX ADMIN — ONDANSETRON 4 MILLIGRAM(S): 8 TABLET, FILM COATED ORAL at 19:06

## 2021-04-27 NOTE — PROGRESS NOTE ADULT - ATTENDING COMMENTS
65 year old male with PMH of CAD s/p MI, PCI/CABG, CHFrEF (15-20%), has ICD, HTN, celiac disease, DM, neuropathy, chronic b/l LE ulcers presents, severe chronic pain,  hx infected R TKR with MRSA (was eventually cemented so has limited ROM R knee), and history of cholecystostomy tube placement in February 2020 for acute cholecystitis s/p removal in May 2020 with multiple presentations including persistent bilious drainage from the prior tract site as well as a RUQ abdominal abscess at the site s/p drainage admitted for septic arthritis.     # Septic arthritis  #MSSA Bacteremia   - s/p steroid injection OP   - Knee aspirated 04/26: cloudy, yellow, CPPD crystals present, neutrophils >50K, RBC >8k  - s/p I&D by ortho 04/26  - Aspirate Cx w/ MSSA, Blood Cx w/ MSSA   - ID following: switch to Nafcillin   - TTE : Mild thickening of the anterior and posterior mitral valve leaflets  - Ortho following   - repeat blood cx for clearance     # Bilious Drainage from abdominal wound  -  cholecystostomy tube placement in February 2020 for acute cholecystitis s/p removal in May 2020 with multiple presentations including persistent bilious drainage from the prior tract site as well as a RUQ abdominal abscess at the site s/p drainage  - CT A/P w/ decreased RUQ stranding, no abscess   - spoke w/ patient prefers to follow up OP w/ team at [facility]   - GI consulted- no interventions, agree w/ OP follow up     # Cachexia  - pt endorses losing 70+ lbs in the last year and a half  - BMI 23.5  - consult nutrition   - likely multifactorial in the setting of chronic disease     #CAD s/p 2 stent PCI that thrombosed, s/p CABG  #CHFrEF EF 15% s/p AICD  #HLD  - hx of 2 thrombosed stents/MI now s/p CABG   - TTE EF 25%  this adm   - has AICD  - Cardiology consulted: cont aspirin, prasugrel, digoxin, toprol, rosuvastatin, aldactone    #Choreiform Movement Disorder - Etiology Unclear   - Neurology consulted during previous admission:   ddx: sporadic paroxysmal non-kinesogenic choreoathetosis vs celiac-related progressive chorea   - c/w Clonazepam 0.5mg TID   - f/u as o/p with neuro    # LE Neuropathy   - continue Duloxetine     # Microcytic anemia likely anemia of chronic dx- iron studies, trend hgb    # DM2- cont insulin pump     DVT ppx: lovenox     FULL CODE     #Progress Note Handoff:  Pending (specify): tx of septic arthritis , clearance of blood cultures, will likely need picc vs midline   Disposition: Home___/SNF___/Other________/Unknown at this time___x_____      Krysten Ohara DO .

## 2021-04-27 NOTE — CONSULT NOTE ADULT - ASSESSMENT
Patient is a 66 y/o male with PMHx of CAD s/p MI, PCI/CABG, CHFrEF (15-20%), has ICD, HTN, celiac disease ( Told by prior GI - not adherent to diet as he felt no improvement) , DM, neuropathy, chronic b/l LE ulcers presents, severe chronic pain,  hx infected R TKR with MRSA (was eventually cemented so has limited ROM R knee), and history of cholecystostomy tube placement in February 2020 for acute cholecystitis s/p removal in May 2020 with multiple presentations including persistent bilious drainage from the prior tract site as well as a RUQ abdominal abscess at the site s/p drainage. He presented to ED with inability to walk/ambulate 2/2 left knee pain. Patient was treated for Septic joint and has had Orthopedic interventions. Advanced GI consulted to see if Endoscopic closure would be something  feasible. Patient without infection from fistula currently or tremendous leakage. Fistula tract formed likely from Percutaneous drain left in and is mature by this point. Patient declined fistulogram ( as per patient). Gallbladder was not visualized on CT scan and no collection noted.  Biliary stenting at this point considering patient is on Aspirin, Effient, Lovenox , as well as being treated for Septic arthritis is not practical. This has been an ongoing process over two years and not currently an acute issues leading to decompensation.  Even if Bile is diverted the tract is mature and he would need surgical intervention in which he may not be a candidate for at this time.     Fistula since 202 s/p percutaneous cholecystostomy tube  - Admitted for Septic joint  - On Effient, asa, Lovenox  - Patient with global cardiomyopathy on recent Echo with EF of 25%  - If ERCP done to divert bile fistula would not heal and surgical intervention would be needed after- Would not pursue during this hospitalization in which he is being treated for septic joint - as abdominal issues are chronic at this point and not leading to decompensation  - To discuss with Advanced Attending  Patient is a 66 y/o male with PMHx of CAD s/p MI, PCI/CABG, CHFrEF (15-20%), has ICD, HTN, celiac disease ( Told by prior GI - not adherent to diet as he felt no improvement) , DM, neuropathy, chronic b/l LE ulcers presents, severe chronic pain,  hx infected R TKR with MRSA (was eventually cemented so has limited ROM R knee), and history of cholecystostomy tube placement in February 2020 for acute cholecystitis s/p removal in May 2020 with multiple presentations including persistent bilious drainage from the prior tract site as well as a RUQ abdominal abscess at the site s/p drainage. He presented to ED with inability to walk/ambulate 2/2 left knee pain. Patient was treated for Septic joint and has had Orthopedic interventions. Advanced GI consulted to see if Endoscopic closure would be something  feasible. Patient without infection from fistula currently or tremendous leakage. Fistula tract formed likely from Percutaneous drain left in and is mature by this point. Patient declined fistulogram ( as per patient). Gallbladder was not visualized on CT scan and no collection noted.  Biliary stenting at this point considering patient is on Aspirin, Effient, Lovenox , as well as being treated for Septic arthritis is not practical. This has been an ongoing process over two years and not currently an acute issues leading to decompensation.  Even if Bile is diverted the tract is mature and he would need surgical intervention in which he may not be a candidate for at this time.     Percutaneous Fistula  s/p percutaneous cholecystostomy tube  - Admitted for Septic joint  - On Effient, asa, Lovenox  - Patient with global cardiomyopathy on recent Echo with EF of 25%  - If ERCP done to divert bile fistula may heal, however may benefit from a vac dressesing at site - To discuss with IR team

## 2021-04-27 NOTE — CHART NOTE - NSCHARTNOTEFT_GEN_A_CORE
Pt is on schedule for [facility] on friday  Keep NPO after midnight on thursday night  Last COVID-19 negative from 4/26

## 2021-04-27 NOTE — PROGRESS NOTE ADULT - SUBJECTIVE AND OBJECTIVE BOX
FLOWER MARISCAL  65y Male   [id number]    Hospital Day: 2  Post Operative Day:  Procedure:  Patient is a 65y old  Male who presents with a chief complaint of Septic arthritis (2021 16:31)    PAST MEDICAL & SURGICAL HISTORY:  Status post percutaneous transluminal coronary angioplasty  2 stents    Atherosclerosis of coronary artery  CAD (coronary artery disease)    Osteoarthritis    HLD (hyperlipidemia)    Blood clot due to device, implant, or graft  was on blood thinners    Diabetes  on insulin pump    HTN (hypertension)    CHF (congestive heart failure)  EF ~ 25%    History of celiac disease    Diverticulitis    STEMI (ST elevation myocardial infarction)    Diabetic neuropathy    Anxiety and depression    Other postprocedural status  Fixation hardware in foot LEFT    Stented coronary artery  10/18 heart attack  INFERIOR WALL MI    S/P CABG x 2018    S/P TKR (total knee replacement), right  with infection Mrsa   per pt he was cleared from MRSA infection    Surgery, elective  right knee wound debridement    History of open reduction and internal fixation (ORIF) procedure    H/O shoulder surgery  right    AICD (automatic cardioverter/defibrillator) present    Cholecystostomy care  drain inserted  &amp; removed 4 months ago    History of tonsillectomy    History of hip replacement, total, right        Events of the Last 24h:  Vital Signs Last 24 Hrs  T(C): 36.6 (2021 01:01), Max: 38.3 (2021 21:00)  T(F): 97.8 (2021 01:01), Max: 101 (2021 21:00)  HR: 90 (2021 01:01) (68 - 100)  BP: 126/66 (2021 01:01) (97/55 - 148/67)  BP(mean): --  RR: 18 (2021 01:01) (12 - 22)  SpO2: 100% (2021 16:00) (96% - 100%)        Diet, DASH/TLC:   Sodium & Cholesterol Restricted  Consistent Carbohydrate Evening Snack  Low Fat (LOWFAT) (21 @ 13:03)      I&O's Summary    2021 07:01  -  2021 02:16  --------------------------------------------------------  IN: 50 mL / OUT: 610 mL / NET: -560 mL     I&O's Detail    2021 07:01  -  2021 02:16  --------------------------------------------------------  IN:    IV PiggyBack: 50 mL  Total IN: 50 mL    OUT:    Accordian (mL): 10 mL    Accordian (mL): 0 mL    Voided (mL): 600 mL  Total OUT: 610 mL    Total NET: -560 mL          MEDICATIONS  (STANDING):  aspirin enteric coated 81 milliGRAM(s) Oral daily  atorvastatin 80 milliGRAM(s) Oral at bedtime  cefepime   IVPB      cefepime   IVPB 2000 milliGRAM(s) IV Intermittent once  cefepime   IVPB 2000 milliGRAM(s) IV Intermittent every 8 hours  chlorhexidine 4% Liquid 1 Application(s) Topical <User Schedule>  DAPTOmycin IVPB 640 milliGRAM(s) IV Intermittent every 24 hours  digoxin     Tablet 125 MICROGram(s) Oral daily  DULoxetine 90 milliGRAM(s) Oral daily  enoxaparin Injectable 40 milliGRAM(s) SubCutaneous daily  insulin aspart (NovoLOG) Pump - Peds 1 Each SubCutaneous Continuous Pump  metoprolol succinate ER 25 milliGRAM(s) Oral daily  pantoprazole    Tablet 40 milliGRAM(s) Oral before breakfast  prasugrel 10 milliGRAM(s) Oral daily  spironolactone 25 milliGRAM(s) Oral daily    MEDICATIONS  (PRN):  acetaminophen   Tablet .. 650 milliGRAM(s) Oral every 6 hours PRN Temp greater or equal to 38C (100.4F), Mild Pain (1 - 3)  ALPRAZolam 0.5 milliGRAM(s) Oral every 8 hours PRN anxiety  furosemide    Tablet 40 milliGRAM(s) Oral daily PRN fluid overload  melatonin 10 milliGRAM(s) Oral at bedtime PRN Insomnia  morphine  - Injectable 4 milliGRAM(s) IV Push every 4 hours PRN Severe Pain (7 - 10)  oxycodone    5 mG/acetaminophen 325 mG 1 Tablet(s) Oral every 6 hours PRN Moderate Pain (4 - 6)      PHYSICAL EXAM:    GENERAL: NAD    HEENT: NCAT    CHEST/LUNGS: CTAB    HEART: RRR,  No murmurs, rubs, or gallops    ABDOMEN: SNTND +BS    EXTREMITIES:  FROM, No clubbing, cyanosis, or edema, palpable pulse    NEURO: No focal neurological deficits    SKIN: No rashes or lesions    INCISION/WOUNDS:                          9.8    16.84 )-----------( 295      ( 2021 22:15 )             32.3        CBC Full  -  ( 2021 22:15 )  WBC Count : 16.84 K/uL  RBC Count : 4.52 M/uL  Hemoglobin : 9.8 g/dL  Hematocrit : 32.3 %  Platelet Count - Automated : 295 K/uL  Mean Cell Volume : 71.5 fL  Mean Cell Hemoglobin : 21.7 pg  Mean Cell Hemoglobin Concentration : 30.3 g/dL  Auto Neutrophil # : 14.38 K/uL  Auto Lymphocyte # : 0.56 K/uL  Auto Monocyte # : 1.70 K/uL  Auto Eosinophil # : 0.04 K/uL  Auto Basophil # : 0.05 K/uL  Auto Neutrophil % : 85.4 %  Auto Lymphocyte % : 3.3 %  Auto Monocyte % : 10.1 %  Auto Eosinophil % : 0.2 %  Auto Basophil % : 0.3 %               135   |  100   |  22                 Ca: 9.3    BMP:   ----------------------------< 249    Mg: x     (21 @ 22:15)             4.5    |  24    | 1.0                Ph: x        LFT:     TPro: 8.2 / Alb: 4.4 / TBili: 1.1 / DBili: x / AST: 26 / ALT: 30 / AlkPhos: 127   (21 @ 22:15)    LIVER FUNCTIONS - ( 2021 22:15 )  Alb: 4.4 g/dL / Pro: 8.2 g/dL / ALK PHOS: 127 U/L / ALT: 30 U/L / AST: 26 U/L / GGT: x           PT/INR - ( 2021 22:15 )   PT: 14.60 sec;   INR: 1.27 ratio         PTT - ( 2021 22:15 )  PTT:27.2 sec      Urinalysis Basic - ( 2021 00:31 )    Color: Light Yellow / Appearance: Clear / S.029 / pH: x  Gluc: x / Ketone: Negative  / Bili: Negative / Urobili: <2 mg/dL   Blood: x / Protein: 30 mg/dL / Nitrite: Negative   Leuk Esterase: Negative / RBC: 2 /HPF / WBC 1 /HPF   Sq Epi: x / Non Sq Epi: 0 /HPF / Bacteria: Negative        Culture - Body Fluid with Gram Stain (collected 2021 01:15)  Source: .Body Fluid Synovial Fluid  Gram Stain (2021 11:35):    polymorphonuclear leukocytes per low power field    No organisms seen per oil power field    by cytocentrifuge    Culture - Blood (collected 2021 00:31)  Source: .Blood Blood  Gram Stain (2021 01:48):    Growth in aerobic bottle: Gram Positive Cocci in Clusters  Preliminary Report (2021 01:49):    Growth in aerobic bottle: Gram Positive Cocci in Clusters    ***Blood Panel PCR results on this specimen are available    approximately 3 hours after the Gram stain result.***    Gram stain, PCR, and/or culture results may not always    correspond due to difference in methodologies.    ************************************************************    This PCR assay was performed by multiplex PCR. This    Assay tests for 66 bacterial and resistance gene targets.    Please refer to the [facility] test directory    at [email or web address] for details.    < from: CT Abdomen and Pelvis w/ IV Cont (21 @ 04:42) >    IMPRESSION:  Since 2021:    1. No evidence of acute intra-abdominal pathology.    2. Decreased area of right upper quadrant subcutaneous stranding at site of prior percutaneous cholecystostomy, without evidence of abscess.    < end of copied text >

## 2021-04-27 NOTE — PROGRESS NOTE ADULT - ASSESSMENT
65 year old male with PMH of CAD s/p MI, PCI/CABG, CHFrEF (15-20%), has ICD, HTN, celiac disease, DM, neuropathy, chronic b/l LE ulcers presents, severe chronic pain,  hx infected R TKR with MRSA (was eventually cemented so has limited ROM R knee), and history of cholecystostomy tube placement in February 2020 for acute cholecystitis s/p removal in May 2020 with multiple presentations including persistent bilious drainage from the prior tract site as well as a RUQ abdominal abscess at the site s/p drainage admitted for septic arthiritis being managed for draining billary tract.    # Septic arthritis  - 2 weeks of worsening left knee pain and welling s/p knee ejection   - Knee aspirated 04/26: clougy, yellow,CPPD crystals present, neutrophils >50K, RBC >8k  - s/p I&D by ortho 04/26  - started on ancef by surg  - f/u ID recs  - ortho recs being followed,   - f/u bcx, knee aspirate cx    # Bilious Drainage from abdominal wound  -  cholecystostomy tube placement in February 2020 for acute cholecystitis s/p removal in May 2020 with multiple presentations including persistent bilious drainage from the prior tract site as well as a RUQ abdominal abscess at the site s/p drainage  - 04/26: T-bili 1.1, alk phos 127, AST/ALT 26/30  - 04/26: CT A&P No evidence of acute intra-abdominal pathology. Decreased area of right upper quadrant subcutaneous stranding at site of prior percutaneous cholecystostomy, without evidence of abscess.   - f/u hepatic panel, LDH  - consult GI  - Order HIDA scan  - possible ERCP with stent placement  - keep pt low fat diet for now     # Cachexia, excessive weight loss in the past 1+ years  - pt endorses losing 70+ Ilbs in the last year and a half  - BMI 23.5  - pt appears malnourished  - consult nutrition   - likely multifactorial in the setting of chronic disease     #CAD s/p 2 stent PCI that thrombosed, s/p CABG  #CHFrEF EF 15% s/p AICD  #HLD  - hx of 2 stents that thrombosed caused an MI --> CABG  - TTE 10/20: G2DD, EF 15-20%  - has AICD  - being followed by dr. Lopez, f/u consult  - c/w home aspirin/statin/effient/metoprolol/spironolactone/digoxin   - lasix 40mg PO prn for fluid overload  - f/u TTE    #Choreiform Movement Disorder - Etiology Unclear   - Was admitted in 03/21 for this disorder, f/u neuro as o/p  - CTH negative, unknown family history  - Neurology consulted during previous admission:   ddx: sporadic paroxysmal non-kinesogenic choreoathetosis vs celiac-related progressive chorea   - c/w Clonazepam 0.5mg TID   - Celiac Abs, paraneoplastic markers, autoimmune markers (OBED, Sjogerns antibodies, lupus, etc.), SPEP/UPEP, Ceruloplasmin, copper level, lyme WB, ACE level, HgbA1C, CRMP5/CV2 abs, NMDA receptor abs, CASPR2 ab, LGI1 ab, B12, TSH, thyroid autoantibodies, PTH levels were sent last admission f/u as o/p with neuro    # LE Neuropathy   - continue Duloxetine     # Microcytic anemia likely anemia of chronic dx  - f/u iron study  - monitor CBC daily  - transfuse if <8  - stable for now, get T&S if hgb downtrending     # DM2  - monitor FS  - cont insulin pump, pt sets the pump up himself    # right knee replacement s/p revision s/p infection now with antibiotic impragnated knee spacer  # right leg wound   - pain management, f/u ortho recs    DVT ppx: lovenox   Gi ppx: ppi  Diet: dash/TLC   Dispo: pending GI, ID, ortho  Code: full, no AD, wife on her way to hospital, GOC was discussed. wife said she will discuss it with her .

## 2021-04-27 NOTE — PROGRESS NOTE ADULT - SUBJECTIVE AND OBJECTIVE BOX
LOIDAFLOWER KLINE  65y, Male  Allergy: ACE inhibitors (Hives)  carvedilol (Other)  enalapril (Hives)  Entresto (Other)      LOS  1d    CHIEF COMPLAINT: Septic arthritis (2021 13:44)      INTERVAL EVENTS/HPI  - No acute events overnight  - T(F): , Max: 101 (21 @ 21:00) - remains febrile  - left knee still with pain   - Tolerating medication  - WBC Count: 15.15 (21 @ 06:14)  WBC Count: 16.84 (21 @ 22:15)     - Creatinine, Serum: 1.0 (21 @ 06:14)  Creatinine, Serum: 1.0 (21 @ 22:15)       ROS  General: Denies rigors, nightsweats  HEENT: Denies headache, rhinorrhea, sore throat, eye pain  CV: Denies CP, palpitations  PULM: Denies wheezing, hemoptysis  GI: Denies hematemesis, hematochezia, melena  : Denies discharge, hematuria  MSK: Denies arthralgias, myalgias  SKIN: Denies rash, lesions  NEURO: Denies paresthesias, weakness  PSYCH: Denies depression, anxiety    VITALS:  T(F): 97.7, Max: 101 (21 @ 21:00)  HR: 70  BP: 94/53  RR: 18Vital Signs Last 24 Hrs  T(C): 36.5 (2021 16:57), Max: 38.3 (2021 21:00)  T(F): 97.7 (2021 16:57), Max: 101 (2021 21:00)  HR: 70 (2021 16:57) (70 - 94)  BP: 94/53 (2021 16:57) (94/53 - 126/66)  BP(mean): --  RR: 18 (2021 16:57) (18 - 18)  SpO2: --    PHYSICAL EXAM:  Gen: NAD, resting in bed  HEENT: Normocephalic, atraumatic  Neck: supple, no lymphadenopathy  CV: Regular rate & regular rhythm  Lungs: decreased BS at bases, no fremitus  Abdomen: Soft, BS present  Ext: Warm, well perfused  Neuro: non focal, awake  Skin: no rash, no erythema  Lines: no phlebitis    FH: Non-contributory  Social Hx: Non-contributory    TESTS & MEASUREMENTS:                        9.2    15.15 )-----------( 275      ( 2021 06:14 )             30.3         133<L>  |  99  |  25<H>  ----------------------------<  214<H>  4.8   |  21  |  1.0    Ca    9.0      2021 06:14  Phos  2.9       Mg     2.0         TPro  6.8  /  Alb  3.5  /  TBili  1.2  /  DBili  0.4<H>  /  AST  22  /  ALT  32  /  AlkPhos  129<H>      eGFR if Non African American: 80 mL/min/1.73M2 (21 @ 06:14)  eGFR if : 92 mL/min/1.73M2 (21 @ 06:14)    LIVER FUNCTIONS - ( 2021 06:14 )  Alb: 3.5 g/dL / Pro: 6.8 g/dL / ALK PHOS: 129 U/L / ALT: 32 U/L / AST: 22 U/L / GGT: x           Urinalysis Basic - ( 2021 00:31 )    Color: Light Yellow / Appearance: Clear / S.029 / pH: x  Gluc: x / Ketone: Negative  / Bili: Negative / Urobili: <2 mg/dL   Blood: x / Protein: 30 mg/dL / Nitrite: Negative   Leuk Esterase: Negative / RBC: 2 /HPF / WBC 1 /HPF   Sq Epi: x / Non Sq Epi: 0 /HPF / Bacteria: Negative        Culture - Body Fluid with Gram Stain (collected 21 @ 01:15)  Source: .Body Fluid Synovial Fluid  Gram Stain (21 @ 11:35):    polymorphonuclear leukocytes per low power field    No organisms seen per oil power field    by cytocentrifuge  Preliminary Report (21 @ 10:47):    Numerous Staphylococcus aureus Susceptibility to follow.    Culture - Urine (collected 21 @ 00:31)  Source: .Urine Clean Catch (Midstream)  Final Report (21 @ 10:57):    <10,000 CFU/mL Normal Urogenital Tricia    Culture - Blood (collected 21 @ 00:31)  Source: .Blood Blood  Gram Stain (21 @ 01:48):    Growth in aerobic bottle: Gram Positive Cocci in Clusters  Preliminary Report (21 @ 01:49):    Growth in aerobic bottle: Gram Positive Cocci in Clusters    ***Blood Panel PCR results on this specimen are available    approximately 3 hours after the Gram stain result.***    Gram stain, PCR, and/or culture results may not always    correspond due to difference in methodologies.    ************************************************************    This PCR assay was performed by multiplex PCR. This    Assay tests for 66 bacterial and resistance gene targets.    Please refer to the [facility] [employer] test directory    at [email or web address] for details.  Organism: Blood Culture PCR (21 @ 03:37)  Organism: Blood Culture PCR (21 @ 03:37)      -  Staphylococcus aureus: Detec Any isolate of Staphylococcus aureus from a blood culture is NOT considered a contaminant.      Method Type: PCR    Culture - Blood (collected 21 @ 00:31)  Source: .Blood Blood  Gram Stain (21 @ 12:53):    Growth in aerobic bottle: Gram Positive Cocci in Clusters    Growth in anaerobic bottle: Gram Positive Cocci in Clusters  Preliminary Report (21 @ 12:54):    Growth in aerobic bottle: Gram Positive Cocci in Clusters    Growth in anaerobic bottle: Gram Positive Cocci in Clusters        Lactate, Blood: 1.4 mmol/L (21 @ 22:15)      INFECTIOUS DISEASES TESTING  COVID-19 PCR: NotDetec (21 @ 06:00)  COVID-19 PCR: NotDetec (21 @ 11:00)  COVID-19 PCR: Detected (21 @ 13:47)  COVID-19 PCR: NotDetec (10-13-20 @ 09:08)  COVID-19 PCR: NotDetec (10-03-20 @ 19:54)  COVID-19 PCR: NotDetec (20 @ 20:40)  COVID-19 PCR: NotDetec (20 @ 07:43)  COVID-19 PCR: NotDetec (20 @ 19:46)  COVID-19 PCR: NotDetec (20 @ 10:18)      INFLAMMATORY MARKERS  C-Reactive Protein, Serum: 108 mg/L (21 @ 22:15)  Sedimentation Rate, Erythrocyte: 102 mm/Hr (21 @ 22:15)  Sedimentation Rate, Erythrocyte: 60 mm/Hr (21 @ 17:00)  C-Reactive Protein, Serum: 7 mg/L (21 @ 17:00)      RADIOLOGY & ADDITIONAL TESTS:  I have personally reviewed the last available Chest xray  CXR      CT  CT Abdomen and Pelvis w/ IV Cont:   EXAM:  CT ABDOMEN AND PELVIS IC            PROCEDURE DATE:  2021            INTERPRETATION:  CLINICAL STATEMENT: Post pain. Drainage catheter removal. Right total knee arthroplasty status post explantation and antibiotic spacer placement.    TECHNIQUE: Contiguous axial CT images were obtained from the lower chest to the pubic symphysis following administration of 100cc Optiray 320 intravenous contrast.  Oral contrast was not administered.  Reformatted images in the coronal and sagittal planes were acquired.    Comparison made with CT abdomen and pelvis 2021.    FINDINGS:    LOWER CHEST: Pacemaker.    HEPATOBILIARY: Gallbladder not visualized. No biliary dilation. Liver unremarkable.    SPLEEN: Unremarkable.    PANCREAS: Unremarkable.    ADRENAL GLANDS: Unremarkable.    KIDNEYS: Unchanged mildly dilated left renal collecting systems. Nonobstructing left renal calculi measuring up to 0.4 cm. No right hydronephrosis.    ABDOMINOPELVIC NODES: Unremarkable.    PELVIC ORGANS: Unremarkable.    PERITONEUM/MESENTERY/BOWEL: Unremarkable.    BONES/SOFT TISSUES: Decreased area of right upper quadrant subcutaneous stranding at site of prior percutaneous cholecystostomy, without evidence of abscess. Right hip gamma nail fixationwith heterotrophic ossification. Degenerative change of lumbar spine.    OTHER: Vascular calcifications.      IMPRESSION:  Since 2021:    1. No evidence of acute intra-abdominal pathology.    2. Decreased area of right upper quadrant subcutaneous stranding at site of prior percutaneous cholecystostomy, without evidence of abscess.              MARIE CHASE MD; Attending Radiologist  This document has been electronically signed. 2021  4:52AM (21 @ 04:42)      CARDIOLOGY TESTING      MEDICATIONS  aspirin enteric coated 81 Oral daily  atorvastatin 80 Oral at bedtime  chlorhexidine 4% Liquid 1 Topical <User Schedule>  digoxin     Tablet 125 Oral daily  DULoxetine 90 Oral daily  enoxaparin Injectable 40 SubCutaneous daily  insulin aspart (NovoLOG) Pump - Peds 1 SubCutaneous Continuous Pump  metoprolol succinate ER 25 Oral daily  nafcillin  IVPB 2 IV Intermittent every 4 hours  nafcillin  IVPB     pantoprazole    Tablet 40 Oral before breakfast  prasugrel 10 Oral daily  spironolactone 25 Oral daily      WEIGHT  Weight (kg): 81 (21 @ 17:23)  Creatinine, Serum: 1.0 mg/dL (21 @ 06:14)      ANTIBIOTICS:  nafcillin  IVPB 2 Gram(s) IV Intermittent every 4 hours  nafcillin  IVPB          All available historical records have been reviewed

## 2021-04-27 NOTE — PROGRESS NOTE ADULT - ATTENDING COMMENTS
Pt. seen/ examined  Labs/ vitals reviewed  Discussed with ID Dr. Gallardo  Will follow with previous ID at [facility].

## 2021-04-27 NOTE — PROGRESS NOTE ADULT - SUBJECTIVE AND OBJECTIVE BOX
----------Daily Progress Note----------    HISTORY OF PRESENT ILLNESS:  Patient is a 65y old Male who presents with a chief complaint of Septic arthritis (2021 02:16)    Currently admitted to medicine with the primary diagnosis of Knee pain, left       Today is hospital day 1d.       Reason for Admission: Septic arthritis  History of Present Illness:   65 year old male with PMH of CAD s/p MI, PCI/CABG, CHFrEF (15-20%), has ICD, HTN, celiac disease, DM, neuropathy, chronic b/l LE ulcers presents, severe chronic pain,  hx infected R TKR with MRSA (was eventually cemented so has limited ROM R knee), and history of cholecystostomy tube placement in 2020 for acute cholecystitis s/p removal in May 2020 with multiple presentations including persistent bilious drainage from the prior tract site as well as a RUQ abdominal abscess at the site s/p drainage. He presented to ED with inability to walk/ambulate 2/2 left knee pain. Pt has hx of knee pain and infections. 2 weeks ago pt received a 'steroid' shot for his left knee for pain. 2 days later, his knee began to swell and he was unable to walk or bend the knee. It progressively gotten worse to the point where the pain was unbearable so he presented to the ED.     Pt endorses a weight loss >70 lbs over the last year, there is bilious drainage coming from where he had a prior per sudhir, jaundice in the finger tips and sclera. Recent admission for uncontrollable movements, no diagnosis given. Pt denies f/c/n/v, chest pain, headaches, UTI symptoms     In the ED:  Joint was aspirated cloudy yellow fluid was seen and sent for cultures. Neutrophil count >50,000 and RBC >8000. Ortho consulted for septic arthritis, s/p arthroscopic drainage. Surg consulted for bile drainage, HIDA scan, consult GI, possible ERCP. CT A&P, No evidence of acute intra-abdominal pathology. Decreased area of right upper quadrant subcutaneous stranding at site of prior percutaneous cholecystostomy, without evidence of abscess. Vitally stable. Admitted to medicine for medical management.    The patient is being followed by Neurology, and he is followed by Cardiology Dr. Lopez in  and Dr. Lilia Caldera in [facility], also by Endo Dr. Cormier at [facility].    INTERVAL HOSPITAL COURSE / OVERNIGHT EVENTS:    Patient was examined and seen at bedside. This morning he is resting comfortably in bed and reports no new issues or overnight events.     Review of Systems: Otherwise unremarkable     <<<<<PAST MEDICAL & SURGICAL HISTORY>>>>>  Status post percutaneous transluminal coronary angioplasty  2 stents    Atherosclerosis of coronary artery  CAD (coronary artery disease)    Osteoarthritis    HLD (hyperlipidemia)    Blood clot due to device, implant, or graft  was on blood thinners    Diabetes  on insulin pump    HTN (hypertension)    CHF (congestive heart failure)  EF ~ 25%    History of celiac disease    Diverticulitis    STEMI (ST elevation myocardial infarction)    Diabetic neuropathy    Anxiety and depression    Other postprocedural status  Fixation hardware in foot LEFT    Stented coronary artery  10/18 heart attack  INFERIOR WALL MI    S/P CABG x 1      S/P TKR (total knee replacement), right  with infection Mrsa   per pt he was cleared from MRSA infection    Surgery, elective  right knee wound debridement    History of open reduction and internal fixation (ORIF) procedure    H/O shoulder surgery  right    AICD (automatic cardioverter/defibrillator) present    Cholecystostomy care  drain inserted  &amp; removed 4 months ago    History of tonsillectomy    History of hip replacement, total, right      ALLERGIES  ACE inhibitors (Hives)  carvedilol (Other)  enalapril (Hives)  Entresto (Other)      Home Medications:  ALPRAZolam 0.5 mg oral tablet: 1 tab(s) orally every 8 hours (2021 12:11)  aspirin 81 mg oral delayed release tablet: 1 tab(s) orally once a day (2021 12:11)  digoxin 125 mcg (0.125 mg) oral tablet: 1 tab(s) orally once a day (2021 12:11)  DULoxetine: 90 milligram(s) orally once a day (2021 12:11)  insulin: pump; HUMULIN (2021 12:11)  Jardiance 10 mg oral tablet: 1 tab(s) orally once a day (in the morning) (2021 12:11)  melatonin 10 mg oral tablet: 1 tab(s) orally once a day (at bedtime), As needed, Insomnia (2021 12:11)  metoprolol succinate 25 mg oral tablet, extended release: 1 tab(s) orally 2 times a day (2021 12:11)  morphine 15 mg oral tablet: 1 tab(s) orally every 4 hours, As Needed - for severe pain (7 to 10 out of 10) (2021 12:11)  prasugrel 10 mg oral tablet: 1 tab(s) orally once a day (2021 12:11)  spironolactone 25 mg oral tablet: 1 tab(s) orally once a day (2021 12:11)        MEDICATIONS  STANDING MEDICATIONS  aspirin enteric coated 81 milliGRAM(s) Oral daily  atorvastatin 80 milliGRAM(s) Oral at bedtime  cefepime   IVPB      cefepime   IVPB 2000 milliGRAM(s) IV Intermittent once  cefepime   IVPB 2000 milliGRAM(s) IV Intermittent every 8 hours  chlorhexidine 4% Liquid 1 Application(s) Topical <User Schedule>  DAPTOmycin IVPB 640 milliGRAM(s) IV Intermittent every 24 hours  digoxin     Tablet 125 MICROGram(s) Oral daily  DULoxetine 90 milliGRAM(s) Oral daily  enoxaparin Injectable 40 milliGRAM(s) SubCutaneous daily  insulin aspart (NovoLOG) Pump - Peds 1 Each SubCutaneous Continuous Pump  metoprolol succinate ER 25 milliGRAM(s) Oral daily  pantoprazole    Tablet 40 milliGRAM(s) Oral before breakfast  prasugrel 10 milliGRAM(s) Oral daily  spironolactone 25 milliGRAM(s) Oral daily    PRN MEDICATIONS  acetaminophen   Tablet .. 650 milliGRAM(s) Oral every 6 hours PRN  ALPRAZolam 0.5 milliGRAM(s) Oral every 8 hours PRN  furosemide    Tablet 40 milliGRAM(s) Oral daily PRN  melatonin 10 milliGRAM(s) Oral at bedtime PRN  morphine  - Injectable 4 milliGRAM(s) IV Push every 4 hours PRN  oxycodone    5 mG/acetaminophen 325 mG 1 Tablet(s) Oral every 6 hours PRN    VITALS:  T(F): 100  HR: 93  BP: 122/64  RR: 18  SpO2: 100%    <<<<<LABS>>>>>                        9.2    15.15 )-----------( 275      ( 2021 06:14 )             30.3     04-27    133<L>  |  99  |  25<H>  ----------------------------<  214<H>  4.8   |  21  |  1.0    Ca    9.0      2021 06:14  Phos  2.9       Mg     2.0         TPro  6.8  /  Alb  3.5  /  TBili  1.2  /  DBili  0.4<H>  /  AST  22  /  ALT  32  /  AlkPhos  129<H>      PT/INR - ( 2021 22:15 )   PT: 14.60 sec;   INR: 1.27 ratio         PTT - ( 2021 22:15 )  PTT:27.2 sec  Urinalysis Basic - ( 2021 00:31 )    Color: Light Yellow / Appearance: Clear / S.029 / pH: x  Gluc: x / Ketone: Negative  / Bili: Negative / Urobili: <2 mg/dL   Blood: x / Protein: 30 mg/dL / Nitrite: Negative   Leuk Esterase: Negative / RBC: 2 /HPF / WBC 1 /HPF   Sq Epi: x / Non Sq Epi: 0 /HPF / Bacteria: Negative            Culture - Body Fluid with Gram Stain (collected 2021 01:15)  Source: .Body Fluid Synovial Fluid  Gram Stain (2021 11:35):    polymorphonuclear leukocytes per low power field    No organisms seen per oil power field    by cytocentrifuge    Culture - Blood (collected 2021 00:31)  Source: .Blood Blood  Gram Stain (2021 01:48):    Growth in aerobic bottle: Gram Positive Cocci in Clusters  Preliminary Report (2021 01:49):    Growth in aerobic bottle: Gram Positive Cocci in Clusters    ***Blood Panel PCR results on this specimen are available    approximately 3 hours after the Gram stain result.***    Gram stain, PCR, and/or culture results may not always    correspond due to difference in methodologies.    ************************************************************    This PCR assay was performed by multiplex PCR. This    Assay tests for 66 bacterial and resistance gene targets.    Please refer to the [facility] test directory    at [email or web address] for details.  Organism: Blood Culture PCR (2021 03:37)  Organism: Blood Culture PCR (2021 03:37)    Culture - Blood (collected 2021 00:31)  Source: .Blood Blood  Gram Stain (2021 02:53):    Growth in aerobic bottle: Gram Positive Cocci in Clusters  Preliminary Report (2021 02:53):    Growth in aerobic bottle: Gram Positive Cocci in Clusters    [id number]        <<<<<RADIOLOGY>>>>>    PHYSICAL EXAM:  General: WN/WD NAD, fatigued post op, cachectic   HEENT: PERRLA, EOMI, moist mucous membranes  Neurology: A&Ox3, nonfocal, ROSEN x 4  Respiratory: CTA B/L, normal respiratory effort, no wheezes, crackles, rales  CV: RRR, S1S2  Abdominal: Soft, NT, ND active bilious drainage of wound in the URQ of abdomen, non tender, no erythema, discharge is bilious and mucoid   Extremities: No edema, + peripheral pulses, faint, extremities cool to the touch, knees wrapped, left knee draining     ----------------------------------------------------------------------------------------------------------------------------------------------------------------------------------------------- ----------Daily Progress Note----------    HISTORY OF PRESENT ILLNESS:  Patient is a 65y old Male who presents with a chief complaint of Septic arthritis (2021 02:16)    Currently admitted to medicine with the primary diagnosis of Knee pain, left       Today is hospital day 1d.       Reason for Admission: Septic arthritis  History of Present Illness:   65 year old male with PMH of CAD s/p MI, PCI/CABG, CHFrEF (15-20%), has ICD, HTN, celiac disease, DM, neuropathy, chronic b/l LE ulcers presents, severe chronic pain,  hx infected R TKR with MRSA (was eventually cemented so has limited ROM R knee), and history of cholecystostomy tube placement in 2020 for acute cholecystitis s/p removal in May 2020 with multiple presentations including persistent bilious drainage from the prior tract site as well as a RUQ abdominal abscess at the site s/p drainage. He presented to ED with inability to walk/ambulate 2/2 left knee pain. Pt has hx of knee pain and infections. 2 weeks ago pt received a 'steroid' shot for his left knee for pain. 2 days later, his knee began to swell and he was unable to walk or bend the knee. It progressively gotten worse to the point where the pain was unbearable so he presented to the ED.     Pt endorses a weight loss >70 lbs over the last year, there is bilious drainage coming from where he had a prior per sudhir, jaundice in the finger tips and sclera. Recent admission for uncontrollable movements, no diagnosis given. Pt denies f/c/n/v, chest pain, headaches, UTI symptoms     In the ED:  Joint was aspirated cloudy yellow fluid was seen and sent for cultures. Neutrophil count >50,000 and RBC >8000. Ortho consulted for septic arthritis, s/p arthroscopic drainage. Surg consulted for bile drainage, HIDA scan, consult GI, possible ERCP. CT A&P, No evidence of acute intra-abdominal pathology. Decreased area of right upper quadrant subcutaneous stranding at site of prior percutaneous cholecystostomy, without evidence of abscess. Vitally stable. Admitted to medicine for medical management.    The patient is being followed by Neurology, and he is followed by Cardiology Dr. Lopez in  and Dr. Lilia Caldera in [facility], also by Endo Dr. Cormier at [facility].    INTERVAL HOSPITAL COURSE / OVERNIGHT EVENTS:    Patient was examined and seen at bedside. This morning he is resting comfortably in bed and reports no new issues or overnight events.     Review of Systems: Otherwise unremarkable     <<<<<PAST MEDICAL & SURGICAL HISTORY>>>>>  Status post percutaneous transluminal coronary angioplasty  2 stents    Atherosclerosis of coronary artery  CAD (coronary artery disease)    Osteoarthritis    HLD (hyperlipidemia)    Blood clot due to device, implant, or graft  was on blood thinners    Diabetes  on insulin pump    HTN (hypertension)    CHF (congestive heart failure)  EF ~ 25%    History of celiac disease    Diverticulitis    STEMI (ST elevation myocardial infarction)    Diabetic neuropathy    Anxiety and depression    Other postprocedural status  Fixation hardware in foot LEFT    Stented coronary artery  10/18 heart attack  INFERIOR WALL MI    S/P CABG x 1      S/P TKR (total knee replacement), right  with infection Mrsa   per pt he was cleared from MRSA infection    Surgery, elective  right knee wound debridement    History of open reduction and internal fixation (ORIF) procedure    H/O shoulder surgery  right    AICD (automatic cardioverter/defibrillator) present    Cholecystostomy care  drain inserted  &amp; removed 4 months ago    History of tonsillectomy    History of hip replacement, total, right      ALLERGIES  ACE inhibitors (Hives)  carvedilol (Other)  enalapril (Hives)  Entresto (Other)      Home Medications:  ALPRAZolam 0.5 mg oral tablet: 1 tab(s) orally every 8 hours (2021 12:11)  aspirin 81 mg oral delayed release tablet: 1 tab(s) orally once a day (2021 12:11)  digoxin 125 mcg (0.125 mg) oral tablet: 1 tab(s) orally once a day (2021 12:11)  DULoxetine: 90 milligram(s) orally once a day (2021 12:11)  insulin: pump; HUMULIN (2021 12:11)  Jardiance 10 mg oral tablet: 1 tab(s) orally once a day (in the morning) (2021 12:11)  melatonin 10 mg oral tablet: 1 tab(s) orally once a day (at bedtime), As needed, Insomnia (2021 12:11)  metoprolol succinate 25 mg oral tablet, extended release: 1 tab(s) orally 2 times a day (2021 12:11)  morphine 15 mg oral tablet: 1 tab(s) orally every 4 hours, As Needed - for severe pain (7 to 10 out of 10) (2021 12:11)  prasugrel 10 mg oral tablet: 1 tab(s) orally once a day (2021 12:11)  spironolactone 25 mg oral tablet: 1 tab(s) orally once a day (2021 12:11)        MEDICATIONS  STANDING MEDICATIONS  aspirin enteric coated 81 milliGRAM(s) Oral daily  atorvastatin 80 milliGRAM(s) Oral at bedtime  cefepime   IVPB      cefepime   IVPB 2000 milliGRAM(s) IV Intermittent once  cefepime   IVPB 2000 milliGRAM(s) IV Intermittent every 8 hours  chlorhexidine 4% Liquid 1 Application(s) Topical <User Schedule>  DAPTOmycin IVPB 640 milliGRAM(s) IV Intermittent every 24 hours  digoxin     Tablet 125 MICROGram(s) Oral daily  DULoxetine 90 milliGRAM(s) Oral daily  enoxaparin Injectable 40 milliGRAM(s) SubCutaneous daily  insulin aspart (NovoLOG) Pump - Peds 1 Each SubCutaneous Continuous Pump  metoprolol succinate ER 25 milliGRAM(s) Oral daily  pantoprazole    Tablet 40 milliGRAM(s) Oral before breakfast  prasugrel 10 milliGRAM(s) Oral daily  spironolactone 25 milliGRAM(s) Oral daily    PRN MEDICATIONS  acetaminophen   Tablet .. 650 milliGRAM(s) Oral every 6 hours PRN  ALPRAZolam 0.5 milliGRAM(s) Oral every 8 hours PRN  furosemide    Tablet 40 milliGRAM(s) Oral daily PRN  melatonin 10 milliGRAM(s) Oral at bedtime PRN  morphine  - Injectable 4 milliGRAM(s) IV Push every 4 hours PRN  oxycodone    5 mG/acetaminophen 325 mG 1 Tablet(s) Oral every 6 hours PRN    VITALS:  T(F): 100  HR: 93  BP: 122/64  RR: 18  SpO2: 100%    <<<<<LABS>>>>>                        9.2    15.15 )-----------( 275      ( 2021 06:14 )             30.3     04-27    133<L>  |  99  |  25<H>  ----------------------------<  214<H>  4.8   |  21  |  1.0    Ca    9.0      2021 06:14  Phos  2.9       Mg     2.0         TPro  6.8  /  Alb  3.5  /  TBili  1.2  /  DBili  0.4<H>  /  AST  22  /  ALT  32  /  AlkPhos  129<H>      PT/INR - ( 2021 22:15 )   PT: 14.60 sec;   INR: 1.27 ratio         PTT - ( 2021 22:15 )  PTT:27.2 sec  Urinalysis Basic - ( 2021 00:31 )    Color: Light Yellow / Appearance: Clear / S.029 / pH: x  Gluc: x / Ketone: Negative  / Bili: Negative / Urobili: <2 mg/dL   Blood: x / Protein: 30 mg/dL / Nitrite: Negative   Leuk Esterase: Negative / RBC: 2 /HPF / WBC 1 /HPF   Sq Epi: x / Non Sq Epi: 0 /HPF / Bacteria: Negative            Culture - Body Fluid with Gram Stain (collected 2021 01:15)  Source: .Body Fluid Synovial Fluid  Gram Stain (2021 11:35):    polymorphonuclear leukocytes per low power field    No organisms seen per oil power field    by cytocentrifuge    Culture - Blood (collected 2021 00:31)  Source: .Blood Blood  Gram Stain (2021 01:48):    Growth in aerobic bottle: Gram Positive Cocci in Clusters  Preliminary Report (2021 01:49):    Growth in aerobic bottle: Gram Positive Cocci in Clusters    ***Blood Panel PCR results on this specimen are available    approximately 3 hours after the Gram stain result.***    Gram stain, PCR, and/or culture results may not always    correspond due to difference in methodologies.    ************************************************************    This PCR assay was performed by multiplex PCR. This    Assay tests for 66 bacterial and resistance gene targets.    Please refer to the [facility] test directory    at [email or web address] for details.  Organism: Blood Culture PCR (2021 03:37)  Organism: Blood Culture PCR (2021 03:37)    Culture - Blood (collected 2021 00:31)  Source: .Blood Blood  Gram Stain (2021 02:53):    Growth in aerobic bottle: Gram Positive Cocci in Clusters  Preliminary Report (2021 02:53):    Growth in aerobic bottle: Gram Positive Cocci in Clusters    [id number]        <<<<<RADIOLOGY>>>>>    PHYSICAL EXAM:  General: pleasant male NAD   HEENT: PERRLA, EOMI, moist mucous membranes  Neurology: A&Ox3, nonfocal, ROSEN x 4  Respiratory: CTA B/L, normal respiratory effort, no wheezes, crackles, rales  CV: RRR, S1S2  Abdominal: Soft, NT, ND, RUQ wound noted w/o drainage   Extremities: No edema, + peripheral pulses    -----------------------------------------------------------------------------------------------------------------------------------------------------------------------------------------------

## 2021-04-27 NOTE — CHART NOTE - NSCHARTNOTEFT_GEN_A_CORE
NUTRITION SUPPORT CONSULTATION    HPI:  65 year old male with PMH of CAD s/p MI, PCI/CABG, CHFrEF (15-20%), has ICD, HTN, celiac disease, DM, neuropathy, chronic b/l LE ulcers presents, severe chronic pain,  hx infected R TKR with MRSA (was eventually cemented so has limited ROM R knee), and history of cholecystostomy tube placement in February 2020 for acute cholecystitis s/p removal in May 2020 with multiple presentations including persistent bilious drainage from the prior tract site as well as a RUQ abdominal abscess at the site s/p drainage. He presented to ED with inability to walk/ambulate 2/2 left knee pain. Pt has hx of knee pain and infections. 2 weeks ago pt received a 'steroid' shot for his left knee for pain. 2 days later, his knee began to swell and he was unable to walk or bend the knee. It progressively gotten worse to the point where the pain was unbearable so he presented to the ED.     Pt endorses a weight loss >70 lbs over the last year, there is bilious drainage coming from where he had a prior per sudhir, jaundice in the finger tips and sclera. Recent admission for uncontrollable movements, no diagnosis given. Pt denies f/c/n/v, chest pain, headaches, UTI symptoms     In the ED:  Joint was aspirated cloudy yellow fluid was seen and sent for cultures. Neutrophil count >50,000 and RBC >8000. Ortho consulted for septic arthritis, s/p arthroscopic drainage. Surg consulted for bile drainage, HIDA scan, consult GI, possible ERCP. CT A&P, No evidence of acute intra-abdominal pathology. Decreased area of right upper quadrant subcutaneous stranding at site of prior percutaneous cholecystostomy, without evidence of abscess. Vitally stable. Admitted to medicine for medical management.    The patient is being followed by Neurology, and he is followed by Cardiology Dr. Lopez in  and Dr. Lilia Caldera in [facility], also by Endo Dr. Cormier at [facility].     (26 Apr 2021 11:34)      PAST MEDICAL & SURGICAL HISTORY:  Status post percutaneous transluminal coronary angioplasty  2 stents  Atherosclerosis of coronary artery  CAD (coronary artery disease)  Osteoarthritis  HLD (hyperlipidemia)  Blood clot due to device, implant, or graft  was on blood thinners  Diabetes  on insulin pump  HTN (hypertension)  CHF (congestive heart failure)  EF ~ 25%  History of celiac disease  Diverticulitis  STEMI (ST elevation myocardial infarction)  Diabetic neuropathy  Anxiety and depression  Other postprocedural status  Fixation hardware in foot LEFT  Stented coronary artery  10/18 heart attack  INFERIOR WALL MI  S/P CABG x 1  2018  S/P TKR (total knee replacement), right  with infection Mrsa   per pt he was cleared from MRSA infection  Surgery, elective  right knee wound debridement  History of open reduction and internal fixation (ORIF) procedure  H/O shoulder surgery  right  AICD (automatic cardioverter/defibrillator) present  Cholecystostomy care  drain inserted 2020 &amp; removed 4 months ago  History of tonsillectomy  History of hip replacement, total, right    Allergies  ACE inhibitors (Hives)  carvedilol (Other)  enalapril (Hives)  Entresto (Other)    MEDICATIONS  (STANDING):  aspirin enteric coated 81 milliGRAM(s) Oral daily  atorvastatin 80 milliGRAM(s) Oral at bedtime  cefepime   IVPB      cefepime   IVPB 2000 milliGRAM(s) IV Intermittent once  cefepime   IVPB 2000 milliGRAM(s) IV Intermittent every 8 hours  chlorhexidine 4% Liquid 1 Application(s) Topical <User Schedule>  DAPTOmycin IVPB 640 milliGRAM(s) IV Intermittent every 24 hours  digoxin     Tablet 125 MICROGram(s) Oral daily  DULoxetine 90 milliGRAM(s) Oral daily  enoxaparin Injectable 40 milliGRAM(s) SubCutaneous daily  insulin aspart (NovoLOG) Pump - Peds 1 Each SubCutaneous Continuous Pump  metoprolol succinate ER 25 milliGRAM(s) Oral daily  pantoprazole    Tablet 40 milliGRAM(s) Oral before breakfast  prasugrel 10 milliGRAM(s) Oral daily  spironolactone 25 milliGRAM(s) Oral daily    MEDICATIONS  (PRN):  acetaminophen   Tablet .. 650 milliGRAM(s) Oral every 6 hours PRN Temp greater or equal to 38C (100.4F), Mild Pain (1 - 3)  ALPRAZolam 0.5 milliGRAM(s) Oral every 8 hours PRN anxiety  furosemide    Tablet 40 milliGRAM(s) Oral daily PRN fluid overload  melatonin 10 milliGRAM(s) Oral at bedtime PRN Insomnia  morphine  - Injectable 4 milliGRAM(s) IV Push every 4 hours PRN Severe Pain (7 - 10)  oxycodone    5 mG/acetaminophen 325 mG 1 Tablet(s) Oral every 6 hours PRN Moderate Pain (4 - 6)      ICU Vital Signs Last 24 Hrs  T(C): 37.8 (27 Apr 2021 04:58), Max: 38.3 (26 Apr 2021 21:00)  T(F): 100 (27 Apr 2021 04:58), Max: 101 (26 Apr 2021 21:00)  HR: 93 (27 Apr 2021 04:58) (68 - 100)  BP: 122/64 (27 Apr 2021 04:58) (97/55 - 148/67)  RR: 18 (27 Apr 2021 04:58) (12 - 22)  SpO2: 100% (26 Apr 2021 16:00) (96% - 100%)    Drug Dosing Weight  Height (cm): 185.4 (26 Apr 2021 17:23)  Weight (kg): 81 (26 Apr 2021 17:23)  BMI (kg/m2): 23.6 (26 Apr 2021 17:23)  BSA (m2): 2.05 (26 Apr 2021 17:23)    EXAM     Abd:     LE:   Enteral access:  IV access:    LABS  04-27    133<L>  |  99  |  25<H>  ----------------------------<  214<H>  4.8   |  21  |  1.0    Ca    9.0      27 Apr 2021 06:14  Phos  2.9     04-27  Mg     2.0     04-27    TPro  6.8  /  Alb  3.5  /  TBili  1.2  /  DBili  0.4<H>  /  AST  22  /  ALT  32  /  AlkPhos  129<H>  04-27                          9.2    15.15 )-----------( 275      ( 27 Apr 2021 06:14 )             30.3       LIVER FUNCTIONS - ( 27 Apr 2021 06:14 )  Alb: 3.5 g/dL / Pro: 6.8 g/dL / ALK PHOS: 129 U/L / ALT: 32 U/L / AST: 22 U/L / GGT: x           CAPILLARY BLOOD GLUCOSE  POCT Blood Glucose.: 265 mg/dL (26 Apr 2021 10:30)      Radiology:  < from: CT Abdomen and Pelvis w/ IV Cont (04.26.21 @ 04:42) >    PROCEDURE DATE:  04/26/2021      INTERPRETATION:  CLINICAL STATEMENT: Post pain. Drainage catheter removal. Right total knee arthroplasty status post explantation and antibiotic spacer placement.    TECHNIQUE: Contiguous axial CT images were obtained from the lower chest to the pubic symphysis following administration of 100cc Optiray 320 intravenous contrast.  Oral contrast was not administered.  Reformatted images in the coronal and sagittal planes were acquired.    Comparison made with CT abdomen and pelvis February 5, 2021.    FINDINGS:    LOWER CHEST: Pacemaker.    HEPATOBILIARY: Gallbladder not visualized. No biliary dilation. Liver unremarkable.    SPLEEN: Unremarkable.    PANCREAS: Unremarkable.    ADRENAL GLANDS: Unremarkable.    KIDNEYS: Unchanged mildly dilated left renal collecting systems. Nonobstructing left renal calculi measuring up to 0.4 cm. No right hydronephrosis.    ABDOMINOPELVIC NODES: Unremarkable.    PELVIC ORGANS: Unremarkable.    PERITONEUM/MESENTERY/BOWEL: Unremarkable.    BONES/SOFT TISSUES: Decreased area of right upper quadrant subcutaneous stranding at site of prior percutaneous cholecystostomy, without evidence of abscess. Right hip gamma nail fixationwith heterotrophic ossification. Degenerative change of lumbar spine.    OTHER: Vascular calcifications.    IMPRESSION:  Since February 5, 2021:    1. No evidence of acute intra-abdominal pathology.    2. Decreased area of right upper quadrant subcutaneous stranding at site of prior percutaneous cholecystostomy, without evidence of abscess.    < end of copied text >      Diet, DASH/TLC:   Sodium & Cholesterol Restricted  Consistent Carbohydrate {Evening Snack}  Low Fat (LOWFAT) (04-26-21 @ 13:03)      ASSESSMENT        PLAN NUTRITION SUPPORT CONSULTATION    HPI:  65 year old male with PMH of CAD s/p MI, PCI/CABG, CHFrEF (15-20%), has ICD, HTN, celiac disease, DM, neuropathy, chronic b/l LE ulcers presents, severe chronic pain,  hx infected R TKR with MRSA (was eventually cemented so has limited ROM R knee), and history of cholecystostomy tube placement in February 2020 for acute cholecystitis s/p removal in May 2020 with multiple presentations including persistent bilious drainage from the prior tract site as well as a RUQ abdominal abscess at the site s/p drainage. He presented to ED with inability to walk/ambulate 2/2 left knee pain. Pt has hx of knee pain and infections. 2 weeks ago pt received a 'steroid' shot for his left knee for pain. 2 days later, his knee began to swell and he was unable to walk or bend the knee. It progressively gotten worse to the point where the pain was unbearable so he presented to the ED.     Pt endorses a weight loss >70 lbs over the last year, there is bilious drainage coming from where he had a prior per sudhir, jaundice in the finger tips and sclera. Recent admission for uncontrollable movements, no diagnosis given. Pt denies f/c/n/v, chest pain, headaches, UTI symptoms     In the ED:  Joint was aspirated cloudy yellow fluid was seen and sent for cultures. Neutrophil count >50,000 and RBC >8000. Ortho consulted for septic arthritis, s/p arthroscopic drainage. Surg consulted for bile drainage, HIDA scan, consult GI, possible ERCP. CT A&P, No evidence of acute intra-abdominal pathology. Decreased area of right upper quadrant subcutaneous stranding at site of prior percutaneous cholecystostomy, without evidence of abscess. Vitally stable. Admitted to medicine for medical management.    The patient is being followed by Neurology, and he is followed by Cardiology Dr. Lopez in  and Dr. Lilia Caldera in [facility], also by Endo Dr. Cormier at [facility]. (26 Apr 2021 11:34)    Pt reports progressive wt loss over the past 2 years. Denies N/V or diarrhea. Had been following a gluten free diet due to suspicion of celiac disease but diagnosis not confirmed (per pt) and diet did not change his symptoms. Diarrhea has since resolved. Reports minimal drainage of bile from sudhir site and output is not dependent on what he consumes PO.      **Pt reports taking iron supplements, MVI and coQ10 at home    PAST MEDICAL & SURGICAL HISTORY:  Status post percutaneous transluminal coronary angioplasty  2 stents  Atherosclerosis of coronary artery  CAD (coronary artery disease)  Osteoarthritis  HLD (hyperlipidemia)  Blood clot due to device, implant, or graft  was on blood thinners  Diabetes  on insulin pump  HTN (hypertension)  CHF (congestive heart failure)  EF ~ 25%  History of celiac disease  Diverticulitis  STEMI (ST elevation myocardial infarction)  Diabetic neuropathy  Anxiety and depression  Other postprocedural status  Fixation hardware in foot LEFT  Stented coronary artery  10/18 heart attack  INFERIOR WALL MI  S/P CABG x 1  2018  S/P TKR (total knee replacement), right  with infection Mrsa   per pt he was cleared from MRSA infection  Surgery, elective  right knee wound debridement  History of open reduction and internal fixation (ORIF) procedure  H/O shoulder surgery  right  AICD (automatic cardioverter/defibrillator) present  Cholecystostomy care  drain inserted 2020 &amp; removed 4 months ago  History of tonsillectomy  History of hip replacement, total, right    Allergies  ACE inhibitors (Hives)  carvedilol (Other)  enalapril (Hives)  Entresto (Other)    MEDICATIONS  (STANDING):  aspirin enteric coated 81 milliGRAM(s) Oral daily  atorvastatin 80 milliGRAM(s) Oral at bedtime  cefepime   IVPB      cefepime   IVPB 2000 milliGRAM(s) IV Intermittent once  cefepime   IVPB 2000 milliGRAM(s) IV Intermittent every 8 hours  chlorhexidine 4% Liquid 1 Application(s) Topical <User Schedule>  DAPTOmycin IVPB 640 milliGRAM(s) IV Intermittent every 24 hours  digoxin     Tablet 125 MICROGram(s) Oral daily  DULoxetine 90 milliGRAM(s) Oral daily  enoxaparin Injectable 40 milliGRAM(s) SubCutaneous daily  insulin aspart (NovoLOG) Pump - Peds 1 Each SubCutaneous Continuous Pump  metoprolol succinate ER 25 milliGRAM(s) Oral daily  pantoprazole    Tablet 40 milliGRAM(s) Oral before breakfast  prasugrel 10 milliGRAM(s) Oral daily  spironolactone 25 milliGRAM(s) Oral daily    MEDICATIONS  (PRN):  acetaminophen   Tablet .. 650 milliGRAM(s) Oral every 6 hours PRN Temp greater or equal to 38C (100.4F), Mild Pain (1 - 3)  ALPRAZolam 0.5 milliGRAM(s) Oral every 8 hours PRN anxiety  furosemide    Tablet 40 milliGRAM(s) Oral daily PRN fluid overload  melatonin 10 milliGRAM(s) Oral at bedtime PRN Insomnia  morphine  - Injectable 4 milliGRAM(s) IV Push every 4 hours PRN Severe Pain (7 - 10)  oxycodone    5 mG/acetaminophen 325 mG 1 Tablet(s) Oral every 6 hours PRN Moderate Pain (4 - 6)      ICU Vital Signs Last 24 Hrs  T(C): 37.8 (27 Apr 2021 04:58), Max: 38.3 (26 Apr 2021 21:00)  T(F): 100 (27 Apr 2021 04:58), Max: 101 (26 Apr 2021 21:00)  HR: 93 (27 Apr 2021 04:58) (68 - 100)  BP: 122/64 (27 Apr 2021 04:58) (97/55 - 148/67)  RR: 18 (27 Apr 2021 04:58) (12 - 22)  SpO2: 100% (26 Apr 2021 16:00) (96% - 100%)    Drug Dosing Weight  Height (cm): 185.4 (26 Apr 2021 17:23)  Weight (kg): 81 (26 Apr 2021 17:23)  BMI (kg/m2): 23.6 (26 Apr 2021 17:23)  BSA (m2): 2.05 (26 Apr 2021 17:23)    Reports usual wt ~250lbs, had been as high as 270-280 but lost ~ 30 lbs intentionally prior to knee surgery. Since that time wt loss has been unintentional and he has dropped as low as 168lbs. He attributes the wt loss to multiple, repeated hospitalizations and rehab stay.     EXAM  A&O, NAD  Appears weak with severe muscle wasting rohit temporal, clavicle   Abd: soft, ND; fistula RUQ dry  +insulin pump  Enteral access: none  IV access: peripheral IV Left AC, Rt hand    LABS  04-27    133<L>  |  99  |  25<H>  ----------------------------<  214<H>  4.8   |  21  |  1.0    Ca    9.0      27 Apr 2021 06:14  Phos  2.9     04-27  Mg     2.0     04-27    TPro  6.8  /  Alb  3.5  /  TBili  1.2  /  DBili  0.4<H>  /  AST  22  /  ALT  32  /  AlkPhos  129<H>  04-27    Vitamin B12, Serum: 412 pg/mL (03.12.21 @ 17:00)  Folate, Serum: 12.9 ng/mL (03.12.21 @ 17:00)                      9.2    15.15 )-----------( 275      ( 27 Apr 2021 06:14 )             30.3     LIVER FUNCTIONS - ( 27 Apr 2021 06:14 )  Alb: 3.5 g/dL / Pro: 6.8 g/dL / ALK PHOS: 129 U/L / ALT: 32 U/L / AST: 22 U/L / GGT: x           CAPILLARY BLOOD GLUCOSE  POCT Blood Glucose.: 265 mg/dL (26 Apr 2021 10:30)    Radiology:  < from: CT Abdomen and Pelvis w/ IV Cont (04.26.21 @ 04:42) >    PROCEDURE DATE:  04/26/2021      INTERPRETATION:  CLINICAL STATEMENT: Post pain. Drainage catheter removal. Right total knee arthroplasty status post explantation and antibiotic spacer placement.    TECHNIQUE: Contiguous axial CT images were obtained from the lower chest to the pubic symphysis following administration of 100cc Optiray 320 intravenous contrast.  Oral contrast was not administered.  Reformatted images in the coronal and sagittal planes were acquired.    Comparison made with CT abdomen and pelvis February 5, 2021.    FINDINGS:    LOWER CHEST: Pacemaker.    HEPATOBILIARY: Gallbladder not visualized. No biliary dilation. Liver unremarkable.    SPLEEN: Unremarkable.    PANCREAS: Unremarkable.    ADRENAL GLANDS: Unremarkable.    KIDNEYS: Unchanged mildly dilated left renal collecting systems. Nonobstructing left renal calculi measuring up to 0.4 cm. No right hydronephrosis.    ABDOMINOPELVIC NODES: Unremarkable.    PELVIC ORGANS: Unremarkable.    PERITONEUM/MESENTERY/BOWEL: Unremarkable.    BONES/SOFT TISSUES: Decreased area of right upper quadrant subcutaneous stranding at site of prior percutaneous cholecystostomy, without evidence of abscess. Right hip gamma nail fixationwith heterotrophic ossification. Degenerative change of lumbar spine.    OTHER: Vascular calcifications.    IMPRESSION:  Since February 5, 2021:    1. No evidence of acute intra-abdominal pathology.    2. Decreased area of right upper quadrant subcutaneous stranding at site of prior percutaneous cholecystostomy, without evidence of abscess.    < end of copied text >    Diet, DASH/TLC:   Sodium & Cholesterol Restricted  Consistent Carbohydrate {Evening Snack}  Low Fat (LOWFAT) (04-26-21 @ 13:03)    ASSESSMENT  - septic arthritis, POD#1 s/p L knee arthroscopic I&D   - right knee replacement (9/2020) s/p revision, +MRSA with antibiotic spacer placement      --wound coverage with medial gastroc flap and STSG; also LLE wound I&D with STSG  - hx acute cholecystitis (2/2020), s/p sudhir drain with drain removal (5/2020) with RUQ abscess with fistula/persistent bile drainage from sudhir tract  - CAD s/p 2 stent PCI that thrombosed, s/p CABG  - CHFrEF EF 15% s/p AICD  - HLD  - LE Neuropathy   - DM type II  - severe malnutrition, marasmus type  - severe wt loss, ~29% over the past year  - r/o nutrient deficiencies    PLAN  - cont current PO diet as tolerated  - add Glucerna shakes 240ml PO q8hrs  - add SF Prosource gelatein 4oz PO q24hrs  - add MVI with minerals 1 tab PO q24hrs, vitamin C 500mg daily  - if resume pt's iron supplements he should take each dose with 250mg vitamin C  - check vitamin D 25-OH, zinc, selenium, carnitine levels with next blood draw  - send iron studies  - glycemic control  - will follow

## 2021-04-27 NOTE — PROGRESS NOTE ADULT - ASSESSMENT
ORTHOPEDIC SURGERY Progress note     65y Male s/p L knee arthroscopic I&D POD 1. Continues to complain of L knee pain, pain is slightly improved compared to yesterday.     Denies numbness/tingling.  Denies f/c/n/v/cp/sob.    Medications:  acetaminophen   Tablet .. 650 milliGRAM(s) Oral every 6 hours PRN  ALPRAZolam 0.5 milliGRAM(s) Oral every 8 hours PRN  aspirin enteric coated 81 milliGRAM(s) Oral daily  atorvastatin 80 milliGRAM(s) Oral at bedtime  cefepime   IVPB      chlorhexidine 4% Liquid 1 Application(s) Topical <User Schedule>  DAPTOmycin IVPB 640 milliGRAM(s) IV Intermittent every 24 hours  digoxin     Tablet 125 MICROGram(s) Oral daily  DULoxetine 90 milliGRAM(s) Oral daily  enoxaparin Injectable 40 milliGRAM(s) SubCutaneous daily  furosemide    Tablet 40 milliGRAM(s) Oral daily PRN  HYDROmorphone  Injectable 0.5 milliGRAM(s) IV Push every 10 minutes PRN  insulin aspart (NovoLOG) Pump - Peds 1 Each SubCutaneous Continuous Pump  lactated ringers. 1000 milliLiter(s) IV Continuous <Continuous>  melatonin 10 milliGRAM(s) Oral at bedtime PRN  metoprolol succinate ER 25 milliGRAM(s) Oral daily  morphine  - Injectable 4 milliGRAM(s) IV Push every 4 hours PRN  morphine  - Injectable 2 milliGRAM(s) IV Push every 10 minutes PRN  ondansetron Injectable 4 milliGRAM(s) IV Push once PRN  oxycodone    5 mG/acetaminophen 325 mG 1 Tablet(s) Oral once PRN  oxycodone    5 mG/acetaminophen 325 mG 1 Tablet(s) Oral every 6 hours PRN  pantoprazole    Tablet 40 milliGRAM(s) Oral before breakfast  prasugrel 10 milliGRAM(s) Oral daily  spironolactone 25 milliGRAM(s) Oral daily    ACE inhibitors (Hives)  carvedilol (Other)  enalapril (Hives)  Entresto (Other)      PMH/PSH:  Status post percutaneous transluminal coronary angioplasty    History of surgery to heart and great vessels, presenting hazards to health    Atherosclerosis of coronary artery    Type 2 diabetes mellitus with neurological manifestations    Type 2 diabetes mellitus    HTN (hypertension)    Osteoarthritis    Chronic systolic CHF (congestive heart failure)    HLD (hyperlipidemia)    Hyperkalemia    COPD, moderate    CKD (chronic kidney disease) stage 2, GFR 60-89 ml/min    Blood clot due to device, implant, or graft    Diabetes    HTN (hypertension)    CHF (congestive heart failure)    History of celiac disease    MRSA bacteremia    Diverticulitis    STEMI (ST elevation myocardial infarction)    Diabetic neuropathy    Celiac disease    Anxiety and depression    Diabetic foot ulcer    Other postprocedural status    History of surgery to major organs, presenting hazards to health    Stented coronary artery    S/P CABG x 1    S/P TKR (total knee replacement), right    Surgery, elective    Surgery, elective    History of open reduction and internal fixation (ORIF) procedure    H/O shoulder surgery    AICD (automatic cardioverter/defibrillator) present    Cholecystostomy care    History of tonsillectomy    History of hip replacement, total, right        Exam:  T(C): 36.8 (04-26-21 @ 13:00), Max: 37.6 (04-26-21 @ 09:50)  HR: 98 (04-26-21 @ 16:00) (68 - 98)  BP: 123/60 (04-26-21 @ 16:00) (97/55 - 148/67)  RR: 17 (04-26-21 @ 16:00) (12 - 22)  SpO2: 100% (04-26-21 @ 16:00) (96% - 100%)  General: Awake, alert, NAD, AAOx3    LLE: Dressing c.d.i. Drain 1 scant, Drain 2 scant sanguinous drainage.  SILT s/s/sp/dp/t.  Motor intact EHL, TA, Gastroc.  .  Palpable DP, PT pulses      Labs:                        9.8    16.84 )-----------( 295      ( 25 Apr 2021 22:15 )             32.3     04-25    135  |  100  |  22<H>  ----------------------------<  249<H>  4.5   |  24  |  1.0    Ca    9.3      25 Apr 2021 22:15    TPro  8.2<H>  /  Alb  4.4  /  TBili  1.1  /  DBili  x   /  AST  26  /  ALT  30  /  AlkPhos  127<H>  04-25    PT/INR - ( 25 Apr 2021 22:15 )   PT: 14.60 sec;   INR: 1.27 ratio         PTT - ( 25 Apr 2021 22:15 )  PTT:27.2 sec      A/P:  65yMale with s/p L knee arthroscopic I&D for septic arthritis POD 1      - WBAT LLE  - Daptomycin, cefepime per ID - blood culture + gram + cocci, synovial fluid cultures pending   - DVT PPX  - Pain control   - Regular diet  - Ortho to follow

## 2021-04-27 NOTE — CONSULT NOTE ADULT - SUBJECTIVE AND OBJECTIVE BOX
Patient is a 64 y/o male with PMHx of CAD s/p MI, PCI/CABG, CHFrEF (15-20%), has ICD, HTN, celiac disease ( Told by prior GI - not adherent to diet as he felt no imprvement) , DM, neuropathy, chronic b/l LE ulcers presents, severe chronic pain,  hx infected R TKR with MRSA (was eventually cemented so has limited ROM R knee), and history of cholecystostomy tube placement in February 2020 for acute cholecystitis s/p removal in May 2020 with multiple presentations including persistent bilious drainage from the prior tract site as well as a RUQ abdominal abscess at the site s/p drainage. He presented to ED with inability to walk/ambulate 2/2 left knee pain. Patient was treated for Septic joint and has had Orthopedic interventions. Advanced GI consulted to see if Endoscopic closure would be something  feasible. Patient notes at one point Dr Colon was going to possibly operate on his Gallbladder at [facility] but than patient contracted Covid. Patient with significant progressive weight loss. Patient tolerating diet and without abdominal pain. Drainage from fistula is daily, and in small amounts.       PAST MEDICAL & SURGICAL HISTORY:  Status post percutaneous transluminal coronary angioplasty  Atherosclerosis of coronary artery  Osteoarthritis  HLD (hyperlipidemia)  Blood clot due to device, implant, or graft  Diabetes on insulin pump  HTN (hypertension)  CHF (congestive heart failure)-EF ~ 25%  History of celiac disease  Diverticulitis  STEMI (ST elevation myocardial infarction)  Diabetic neuropathy  Anxiety and depression  Fixation hardware in foot LEFT  S/P TKR (total knee replacement), right  H/O shoulder surgery-right  AICD (automatic cardioverter/defibrillator) present  Cholecystostomy   History of tonsillectomy        MEDICATIONS  (STANDING):  aspirin enteric coated 81 milliGRAM(s) Oral daily  atorvastatin 80 milliGRAM(s) Oral at bedtime  chlorhexidine 4% Liquid 1 Application(s) Topical <User Schedule>  digoxin     Tablet 125 MICROGram(s) Oral daily  DULoxetine 90 milliGRAM(s) Oral daily  enoxaparin Injectable 40 milliGRAM(s) SubCutaneous daily  insulin aspart (NovoLOG) Pump - Peds 1 Each SubCutaneous Continuous Pump  metoprolol succinate ER 25 milliGRAM(s) Oral daily  nafcillin  IVPB 2 Gram(s) IV Intermittent once  nafcillin  IVPB 2 Gram(s) IV Intermittent every 4 hours  nafcillin  IVPB      pantoprazole    Tablet 40 milliGRAM(s) Oral before breakfast  prasugrel 10 milliGRAM(s) Oral daily  spironolactone 25 milliGRAM(s) Oral daily    MEDICATIONS  (PRN):  acetaminophen   Tablet .. 650 milliGRAM(s) Oral every 6 hours PRN Temp greater or equal to 38C (100.4F), Mild Pain (1 - 3)  ALPRAZolam 0.5 milliGRAM(s) Oral every 8 hours PRN anxiety  furosemide    Tablet 40 milliGRAM(s) Oral daily PRN fluid overload  melatonin 10 milliGRAM(s) Oral at bedtime PRN Insomnia  morphine  - Injectable 4 milliGRAM(s) IV Push every 4 hours PRN Severe Pain (7 - 10)  oxycodone    5 mG/acetaminophen 325 mG 1 Tablet(s) Oral every 6 hours PRN Moderate Pain (4 - 6)      Allergies  ACE inhibitors (Hives)  carvedilol (Other)  enalapril (Hives)  Entresto (Other)      Review of Systems  General:  See HPI  HEENT: Denies Trouble Swallowing ,Denies  Sore Throat , Denies Change in hearing/vision/speech ,Denies Dizziness    Cardio: Denies  Chest Pain , Palpitations    Respiratory: Denies worsening of SOB, Denies Cough  Abdomen: See detailed HPI  Neuro: Denies Headache Denies Dizziness, Denies Paresthesias  MSK: See HPI  Psych: Patient denies depression, denies suicidal or homicidal ideations  Integ: Patient Denies rash, or new skin lesions         Vital Signs  T(F): 100 (27 Apr 2021 04:58), Max: 101 (26 Apr 2021 21:00)  HR: 93 (27 Apr 2021 04:58) (90 - 100)  BP: 122/64 (27 Apr 2021 04:58) (110/56 - 126/66)  RR: 18 (27 Apr 2021 04:58) (17 - 18)  SpO2: 100% (26 Apr 2021 16:00) (97% - 100%)  Physical Exam  Gen: NAD, comfortable but chronically ill in appearance   HEENT: AT, B/L temporal wasting  Cardio: S1/S2 No S3/S4, Regular  Resp: CTA B/L  Abdomen: Soft, ND/NT, Insulin pump noted, Fistula noted in RUQ, appears dry and dressing dry, no current pustular drainage or significant erythema   Neuro: AAOx3  Extremities: FROM x 4        LABS:                       9.2    15.15 )-----------( 275      ( 27 Apr 2021 06:14 )             30.3       Auto Neutrophil %: 85.7 % (04-27-21 @ 06:14)  Auto Immature Granulocyte %: 0.4 % (04-27-21 @ 06:14)    04-27    133<L>  |  99  |  25<H>  ----------------------------<  214<H>  4.8   |  21  |  1.0      Calcium, Total Serum: 9.0 mg/dL (04-27-21 @ 06:14)      LFTs:             6.8  | 1.2  | 22       ------------------[129     ( 27 Apr 2021 06:14 )  3.5  | 0.4  | 32          Lactate, Blood: 1.4 mmol/L (04-25-21 @ 22:15)    Coags:     14.60  ----< 1.27    ( 25 Apr 2021 22:15 )     27.2          RADIOLOGY & ADDITIONAL STUDIES:  CT Abdomen and Pelvis w/ IV Cont 04.26.21   IMPRESSION:  Since February 5, 2021:    1. No evidence of acute intra-abdominal pathology.    2. Decreased area of right upper quadrant subcutaneous stranding at site of prior percutaneous cholecystostomy, without evidence of abscess.

## 2021-04-27 NOTE — PROGRESS NOTE ADULT - ASSESSMENT
This isASSESSMENT  65 year old male with PMH of CAD s/p MI, PCI/CABG, CHFrEF (15-20%), has ICD, HTN, celiac disease, DM, neuropathy, chronic b/l LE ulcers presents, severe chronic pain,  hx infected R TKR with MRSA (was eventually cemented so has limited ROM R knee), and history of cholecystostomy tube placement in February 2020 for acute cholecystitis s/p removal in May 2020 with multiple presentations including persistent bilious drainage from the prior tract site as well as a RUQ abdominal abscess at the site s/p drainage who presents with left knee pain    IMPRESSION  #Septic Arthritis of left knee  - s/p arthrocentesis of left knee - consistent with baterial septic infection   - s/p OR washout 4/26 -- purulent fluid in left knee with significant tricompartmental arthritis   - Blood Cx 4/26 MSSA  - OR Cx 4/26 Staph aureus    #Biliary Drainage from previous perc sudhir site  - Previous Intraabdominal 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 Decreased area of right upper quadrant subcutaneous stranding at site of prior percutaneous cholecystostomy, without evidence of abscess.    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    #CHF s/p ICD  #DM   #Abx allergy: carvedilol (Other)  enalapril (Hives)  Entresto (Other)    Creatinine, Serum: 1.0 mg/dL (04.25.21 @ 22:15)    RECOMMENDATIONS  - agree with nafcillin 2g q 4 hours  - TTE reviewed -- recommend JOHN PAUL given ICD   - repeat blood cultures until NG    Please call or message on [employer] Teams if with any questions.  Spectra [phone]

## 2021-04-28 LAB
-  AMPICILLIN/SULBACTAM: SIGNIFICANT CHANGE UP
-  AMPICILLIN/SULBACTAM: SIGNIFICANT CHANGE UP
-  CEFAZOLIN: SIGNIFICANT CHANGE UP
-  CEFAZOLIN: SIGNIFICANT CHANGE UP
-  CLINDAMYCIN: SIGNIFICANT CHANGE UP
-  CLINDAMYCIN: SIGNIFICANT CHANGE UP
-  ERYTHROMYCIN: SIGNIFICANT CHANGE UP
-  ERYTHROMYCIN: SIGNIFICANT CHANGE UP
-  GENTAMICIN: SIGNIFICANT CHANGE UP
-  GENTAMICIN: SIGNIFICANT CHANGE UP
-  OXACILLIN: SIGNIFICANT CHANGE UP
-  OXACILLIN: SIGNIFICANT CHANGE UP
-  PENICILLIN: SIGNIFICANT CHANGE UP
-  PENICILLIN: SIGNIFICANT CHANGE UP
-  RIFAMPIN: SIGNIFICANT CHANGE UP
-  RIFAMPIN: SIGNIFICANT CHANGE UP
-  TETRACYCLINE: SIGNIFICANT CHANGE UP
-  TETRACYCLINE: SIGNIFICANT CHANGE UP
-  TRIMETHOPRIM/SULFAMETHOXAZOLE: SIGNIFICANT CHANGE UP
-  TRIMETHOPRIM/SULFAMETHOXAZOLE: SIGNIFICANT CHANGE UP
-  VANCOMYCIN: SIGNIFICANT CHANGE UP
-  VANCOMYCIN: SIGNIFICANT CHANGE UP
ALBUMIN SERPL ELPH-MCNC: 3.4 G/DL — LOW (ref 3.5–5.2)
ALP SERPL-CCNC: 123 U/L — HIGH (ref 30–115)
ALT FLD-CCNC: 31 U/L — SIGNIFICANT CHANGE UP (ref 0–41)
ANION GAP SERPL CALC-SCNC: 12 MMOL/L — SIGNIFICANT CHANGE UP (ref 7–14)
AST SERPL-CCNC: 21 U/L — SIGNIFICANT CHANGE UP (ref 0–41)
BASOPHILS # BLD AUTO: 0.04 K/UL — SIGNIFICANT CHANGE UP (ref 0–0.2)
BASOPHILS NFR BLD AUTO: 0.4 % — SIGNIFICANT CHANGE UP (ref 0–1)
BILIRUB SERPL-MCNC: 0.8 MG/DL — SIGNIFICANT CHANGE UP (ref 0.2–1.2)
BUN SERPL-MCNC: 25 MG/DL — HIGH (ref 10–20)
CALCIUM SERPL-MCNC: 8.9 MG/DL — SIGNIFICANT CHANGE UP (ref 8.5–10.1)
CHLORIDE SERPL-SCNC: 99 MMOL/L — SIGNIFICANT CHANGE UP (ref 98–110)
CO2 SERPL-SCNC: 23 MMOL/L — SIGNIFICANT CHANGE UP (ref 17–32)
CREAT SERPL-MCNC: 1 MG/DL — SIGNIFICANT CHANGE UP (ref 0.7–1.5)
CULTURE RESULTS: SIGNIFICANT CHANGE UP
CULTURE RESULTS: SIGNIFICANT CHANGE UP
EOSINOPHIL # BLD AUTO: 0.23 K/UL — SIGNIFICANT CHANGE UP (ref 0–0.7)
EOSINOPHIL NFR BLD AUTO: 2.1 % — SIGNIFICANT CHANGE UP (ref 0–8)
GLUCOSE BLDC GLUCOMTR-MCNC: 310 MG/DL — HIGH (ref 70–99)
GLUCOSE SERPL-MCNC: 304 MG/DL — HIGH (ref 70–99)
HCT VFR BLD CALC: 29.6 % — LOW (ref 42–52)
HGB BLD-MCNC: 8.9 G/DL — LOW (ref 14–18)
IMM GRANULOCYTES NFR BLD AUTO: 0.7 % — HIGH (ref 0.1–0.3)
LYMPHOCYTES # BLD AUTO: 0.63 K/UL — LOW (ref 1.2–3.4)
LYMPHOCYTES # BLD AUTO: 5.7 % — LOW (ref 20.5–51.1)
MAGNESIUM SERPL-MCNC: 2 MG/DL — SIGNIFICANT CHANGE UP (ref 1.8–2.4)
MCHC RBC-ENTMCNC: 21.1 PG — LOW (ref 27–31)
MCHC RBC-ENTMCNC: 30.1 G/DL — LOW (ref 32–37)
MCV RBC AUTO: 70.3 FL — LOW (ref 80–94)
METHOD TYPE: SIGNIFICANT CHANGE UP
METHOD TYPE: SIGNIFICANT CHANGE UP
MONOCYTES # BLD AUTO: 1.12 K/UL — HIGH (ref 0.1–0.6)
MONOCYTES NFR BLD AUTO: 10.2 % — HIGH (ref 1.7–9.3)
NEUTROPHILS # BLD AUTO: 8.9 K/UL — HIGH (ref 1.4–6.5)
NEUTROPHILS NFR BLD AUTO: 80.9 % — HIGH (ref 42.2–75.2)
NRBC # BLD: 0 /100 WBCS — SIGNIFICANT CHANGE UP (ref 0–0)
ORGANISM # SPEC MICROSCOPIC CNT: SIGNIFICANT CHANGE UP
PLATELET # BLD AUTO: 284 K/UL — SIGNIFICANT CHANGE UP (ref 130–400)
POTASSIUM SERPL-MCNC: 4.5 MMOL/L — SIGNIFICANT CHANGE UP (ref 3.5–5)
POTASSIUM SERPL-SCNC: 4.5 MMOL/L — SIGNIFICANT CHANGE UP (ref 3.5–5)
PROT SERPL-MCNC: 6.7 G/DL — SIGNIFICANT CHANGE UP (ref 6–8)
RBC # BLD: 4.21 M/UL — LOW (ref 4.7–6.1)
RBC # FLD: 18.6 % — HIGH (ref 11.5–14.5)
SODIUM SERPL-SCNC: 134 MMOL/L — LOW (ref 135–146)
SPECIMEN SOURCE: SIGNIFICANT CHANGE UP
SPECIMEN SOURCE: SIGNIFICANT CHANGE UP
WBC # BLD: 11 K/UL — HIGH (ref 4.8–10.8)
WBC # FLD AUTO: 11 K/UL — HIGH (ref 4.8–10.8)

## 2021-04-28 PROCEDURE — 93289 INTERROG DEVICE EVAL HEART: CPT | Mod: 26

## 2021-04-28 PROCEDURE — 99233 SBSQ HOSP IP/OBS HIGH 50: CPT

## 2021-04-28 RX ORDER — INSULIN GLARGINE 100 [IU]/ML
20 INJECTION, SOLUTION SUBCUTANEOUS AT BEDTIME
Refills: 0 | Status: DISCONTINUED | OUTPATIENT
Start: 2021-04-28 | End: 2021-04-29

## 2021-04-28 RX ORDER — INSULIN LISPRO 100/ML
6 VIAL (ML) SUBCUTANEOUS
Refills: 0 | Status: DISCONTINUED | OUTPATIENT
Start: 2021-04-28 | End: 2021-04-30

## 2021-04-28 RX ORDER — INSULIN LISPRO 100/ML
VIAL (ML) SUBCUTANEOUS
Refills: 0 | Status: DISCONTINUED | OUTPATIENT
Start: 2021-04-28 | End: 2021-04-30

## 2021-04-28 RX ORDER — DEXTROSE 50 % IN WATER 50 %
15 SYRINGE (ML) INTRAVENOUS ONCE
Refills: 0 | Status: DISCONTINUED | OUTPATIENT
Start: 2021-04-28 | End: 2021-05-27

## 2021-04-28 RX ORDER — GLUCAGON INJECTION, SOLUTION 0.5 MG/.1ML
1 INJECTION, SOLUTION SUBCUTANEOUS ONCE
Refills: 0 | Status: DISCONTINUED | OUTPATIENT
Start: 2021-04-28 | End: 2021-05-27

## 2021-04-28 RX ORDER — DEXTROSE 50 % IN WATER 50 %
25 SYRINGE (ML) INTRAVENOUS ONCE
Refills: 0 | Status: DISCONTINUED | OUTPATIENT
Start: 2021-04-28 | End: 2021-05-27

## 2021-04-28 RX ORDER — ASCORBIC ACID 60 MG
500 TABLET,CHEWABLE ORAL DAILY
Refills: 0 | Status: DISCONTINUED | OUTPATIENT
Start: 2021-04-28 | End: 2021-05-27

## 2021-04-28 RX ORDER — SODIUM CHLORIDE 9 MG/ML
1000 INJECTION, SOLUTION INTRAVENOUS
Refills: 0 | Status: DISCONTINUED | OUTPATIENT
Start: 2021-04-28 | End: 2021-05-27

## 2021-04-28 RX ORDER — MULTIVIT-MIN/FERROUS GLUCONATE 9 MG/15 ML
1 LIQUID (ML) ORAL DAILY
Refills: 0 | Status: DISCONTINUED | OUTPATIENT
Start: 2021-04-28 | End: 2021-05-27

## 2021-04-28 RX ORDER — DEXTROSE 50 % IN WATER 50 %
12.5 SYRINGE (ML) INTRAVENOUS ONCE
Refills: 0 | Status: DISCONTINUED | OUTPATIENT
Start: 2021-04-28 | End: 2021-05-27

## 2021-04-28 RX ADMIN — MORPHINE SULFATE 4 MILLIGRAM(S): 50 CAPSULE, EXTENDED RELEASE ORAL at 01:49

## 2021-04-28 RX ADMIN — Medication 81 MILLIGRAM(S): at 11:25

## 2021-04-28 RX ADMIN — Medication 25 MILLIGRAM(S): at 05:42

## 2021-04-28 RX ADMIN — PRASUGREL 10 MILLIGRAM(S): 5 TABLET, FILM COATED ORAL at 11:25

## 2021-04-28 RX ADMIN — INSULIN GLARGINE 20 UNIT(S): 100 INJECTION, SOLUTION SUBCUTANEOUS at 22:09

## 2021-04-28 RX ADMIN — NAFCILLIN 200 GRAM(S): 10 INJECTION, POWDER, FOR SOLUTION INTRAVENOUS at 11:25

## 2021-04-28 RX ADMIN — Medication 4: at 17:15

## 2021-04-28 RX ADMIN — MORPHINE SULFATE 4 MILLIGRAM(S): 50 CAPSULE, EXTENDED RELEASE ORAL at 16:16

## 2021-04-28 RX ADMIN — Medication 6 UNIT(S): at 17:16

## 2021-04-28 RX ADMIN — Medication 0.5 MILLIGRAM(S): at 07:44

## 2021-04-28 RX ADMIN — MORPHINE SULFATE 4 MILLIGRAM(S): 50 CAPSULE, EXTENDED RELEASE ORAL at 07:42

## 2021-04-28 RX ADMIN — MORPHINE SULFATE 4 MILLIGRAM(S): 50 CAPSULE, EXTENDED RELEASE ORAL at 22:37

## 2021-04-28 RX ADMIN — PANTOPRAZOLE SODIUM 40 MILLIGRAM(S): 20 TABLET, DELAYED RELEASE ORAL at 05:42

## 2021-04-28 RX ADMIN — ATORVASTATIN CALCIUM 80 MILLIGRAM(S): 80 TABLET, FILM COATED ORAL at 22:04

## 2021-04-28 RX ADMIN — Medication 125 MICROGRAM(S): at 05:42

## 2021-04-28 RX ADMIN — NAFCILLIN 200 GRAM(S): 10 INJECTION, POWDER, FOR SOLUTION INTRAVENOUS at 16:13

## 2021-04-28 RX ADMIN — MORPHINE SULFATE 4 MILLIGRAM(S): 50 CAPSULE, EXTENDED RELEASE ORAL at 02:30

## 2021-04-28 RX ADMIN — NAFCILLIN 200 GRAM(S): 10 INJECTION, POWDER, FOR SOLUTION INTRAVENOUS at 22:04

## 2021-04-28 RX ADMIN — MORPHINE SULFATE 4 MILLIGRAM(S): 50 CAPSULE, EXTENDED RELEASE ORAL at 11:26

## 2021-04-28 RX ADMIN — SPIRONOLACTONE 25 MILLIGRAM(S): 25 TABLET, FILM COATED ORAL at 05:42

## 2021-04-28 RX ADMIN — DULOXETINE HYDROCHLORIDE 90 MILLIGRAM(S): 30 CAPSULE, DELAYED RELEASE ORAL at 11:25

## 2021-04-28 RX ADMIN — NAFCILLIN 200 GRAM(S): 10 INJECTION, POWDER, FOR SOLUTION INTRAVENOUS at 05:42

## 2021-04-28 RX ADMIN — NAFCILLIN 200 GRAM(S): 10 INJECTION, POWDER, FOR SOLUTION INTRAVENOUS at 01:44

## 2021-04-28 RX ADMIN — CHLORHEXIDINE GLUCONATE 1 APPLICATION(S): 213 SOLUTION TOPICAL at 05:42

## 2021-04-28 RX ADMIN — ENOXAPARIN SODIUM 40 MILLIGRAM(S): 100 INJECTION SUBCUTANEOUS at 11:25

## 2021-04-28 NOTE — PROGRESS NOTE ADULT - SUBJECTIVE AND OBJECTIVE BOX
ORTHO PROGRESS NOTE    Interval History:  65M s/p L knee arthroscopic I&D POD2. Continues to complain of L knee pain, pain is slightly improved compared to yesterday.     MEDICATIONS  (STANDING):  aspirin enteric coated 81 milliGRAM(s) Oral daily  atorvastatin 80 milliGRAM(s) Oral at bedtime  chlorhexidine 4% Liquid 1 Application(s) Topical <User Schedule>  digoxin     Tablet 125 MICROGram(s) Oral daily  DULoxetine 90 milliGRAM(s) Oral daily  enoxaparin Injectable 40 milliGRAM(s) SubCutaneous daily  insulin aspart (NovoLOG) Pump - Peds 1 Each SubCutaneous Continuous Pump  metoprolol succinate ER 25 milliGRAM(s) Oral daily  nafcillin  IVPB 2 Gram(s) IV Intermittent every 4 hours  nafcillin  IVPB      pantoprazole    Tablet 40 milliGRAM(s) Oral before breakfast  prasugrel 10 milliGRAM(s) Oral daily  spironolactone 25 milliGRAM(s) Oral daily    MEDICATIONS  (PRN):  acetaminophen   Tablet .. 650 milliGRAM(s) Oral every 6 hours PRN Temp greater or equal to 38C (100.4F), Mild Pain (1 - 3)  ALPRAZolam 0.5 milliGRAM(s) Oral every 8 hours PRN anxiety  furosemide    Tablet 40 milliGRAM(s) Oral daily PRN fluid overload  melatonin 10 milliGRAM(s) Oral at bedtime PRN Insomnia  morphine  - Injectable 4 milliGRAM(s) IV Push every 4 hours PRN Severe Pain (7 - 10)  oxycodone    5 mG/acetaminophen 325 mG 1 Tablet(s) Oral every 6 hours PRN Moderate Pain (4 - 6)    Vital Signs Last 24 Hrs  T(C): 36.4 (28 Apr 2021 05:09), Max: 37.2 (27 Apr 2021 21:00)  T(F): 97.6 (28 Apr 2021 05:09), Max: 98.9 (27 Apr 2021 21:00)  HR: 91 (28 Apr 2021 05:09) (70 - 91)  BP: 129/71 (28 Apr 2021 05:09) (94/53 - 129/71)  RR: 18 (28 Apr 2021 05:09) (18 - 18)    Physical Exam:  LLE: Dressing C/D/I.   Drain 1 minimal  Drain 2 minimal  SILT s/s/sp/dp/t. Motor intact EHL, TA, Gastroc.   Palpable DP, PT pulses.                        9.2    15.15 )-----------( 275      ( 27 Apr 2021 06:14 )             30.3     133<L>  |  99  |  25<H>  ----------------------------<  214<H>  4.8   |  21  |  1.0    Ca    9.0      27 Apr 2021 06:14  Phos  2.9     04-27  Mg     2.0     04-27  TPro  6.8  /  Alb  3.5  /  TBili  1.2  /  DBili  0.4<H>  /  AST  22  /  ALT  32  /  AlkPhos  129<H>  04-27    A/P: 65M with s/p L knee arthroscopic I&D for septic arthritis POD2.    - WBAT LLE  - Daptomycin, cefepime per ID  - Outpatient ID f/u at [facility]  - DVT PPX  - Pain control   - Regular diet  - Ortho to follow  ORTHO PROGRESS NOTE    Interval History:  65M s/p L knee arthroscopic I&D POD2. Continues to complain of L knee pain, pain is slightly improved compared to yesterday.     MEDICATIONS  (STANDING):  aspirin enteric coated 81 milliGRAM(s) Oral daily  atorvastatin 80 milliGRAM(s) Oral at bedtime  chlorhexidine 4% Liquid 1 Application(s) Topical <User Schedule>  digoxin     Tablet 125 MICROGram(s) Oral daily  DULoxetine 90 milliGRAM(s) Oral daily  enoxaparin Injectable 40 milliGRAM(s) SubCutaneous daily  insulin aspart (NovoLOG) Pump - Peds 1 Each SubCutaneous Continuous Pump  metoprolol succinate ER 25 milliGRAM(s) Oral daily  nafcillin  IVPB 2 Gram(s) IV Intermittent every 4 hours  nafcillin  IVPB      pantoprazole    Tablet 40 milliGRAM(s) Oral before breakfast  prasugrel 10 milliGRAM(s) Oral daily  spironolactone 25 milliGRAM(s) Oral daily    MEDICATIONS  (PRN):  acetaminophen   Tablet .. 650 milliGRAM(s) Oral every 6 hours PRN Temp greater or equal to 38C (100.4F), Mild Pain (1 - 3)  ALPRAZolam 0.5 milliGRAM(s) Oral every 8 hours PRN anxiety  furosemide    Tablet 40 milliGRAM(s) Oral daily PRN fluid overload  melatonin 10 milliGRAM(s) Oral at bedtime PRN Insomnia  morphine  - Injectable 4 milliGRAM(s) IV Push every 4 hours PRN Severe Pain (7 - 10)  oxycodone    5 mG/acetaminophen 325 mG 1 Tablet(s) Oral every 6 hours PRN Moderate Pain (4 - 6)    Vital Signs Last 24 Hrs  T(C): 36.4 (28 Apr 2021 05:09), Max: 37.2 (27 Apr 2021 21:00)  T(F): 97.6 (28 Apr 2021 05:09), Max: 98.9 (27 Apr 2021 21:00)  HR: 91 (28 Apr 2021 05:09) (70 - 91)  BP: 129/71 (28 Apr 2021 05:09) (94/53 - 129/71)  RR: 18 (28 Apr 2021 05:09) (18 - 18)    Physical Exam:  LLE: Dressing C/D/I.   Drain 1 minimal  Drain 2 minimal  SILT s/s/sp/dp/t. Motor intact EHL, TA, Gastroc.   Palpable DP, PT pulses.                        9.2    15.15 )-----------( 275      ( 27 Apr 2021 06:14 )             30.3     133<L>  |  99  |  25<H>  ----------------------------<  214<H>  4.8   |  21  |  1.0    Ca    9.0      27 Apr 2021 06:14  Phos  2.9     04-27  Mg     2.0     04-27  TPro  6.8  /  Alb  3.5  /  TBili  1.2  /  DBili  0.4<H>  /  AST  22  /  ALT  32  /  AlkPhos  129<H>  04-27    A/P: 65M with s/p L knee arthroscopic I&D for septic arthritis POD2.    - WBAT LLE  - Abx: Nafcillin  - Outpatient ID f/u at [facility]  - DVT PPX  - Pain control   - Regular diet  - Ortho to follow

## 2021-04-28 NOTE — PROGRESS NOTE ADULT - SUBJECTIVE AND OBJECTIVE BOX
FLOWER MARISCAL  65y Male   [id number]    Hospital Day: 3  Post Operative Day:2  Procedure:  Patient is a 65y old  Male who presents with a chief complaint of Septic arthritis (2021 17:42)    PAST MEDICAL & SURGICAL HISTORY:  Status post percutaneous transluminal coronary angioplasty  2 stents    Atherosclerosis of coronary artery  CAD (coronary artery disease)    Osteoarthritis    HLD (hyperlipidemia)    Blood clot due to device, implant, or graft  was on blood thinners    Diabetes  on insulin pump    HTN (hypertension)    CHF (congestive heart failure)  EF ~ 25%    History of celiac disease    Diverticulitis    STEMI (ST elevation myocardial infarction)    Diabetic neuropathy    Anxiety and depression    Other postprocedural status  Fixation hardware in foot LEFT    Stented coronary artery  10/18 heart attack  INFERIOR WALL MI    S/P CABG x 2018    S/P TKR (total knee replacement), right  with infection Mrsa   per pt he was cleared from MRSA infection    Surgery, elective  right knee wound debridement    History of open reduction and internal fixation (ORIF) procedure    H/O shoulder surgery  right    AICD (automatic cardioverter/defibrillator) present    Cholecystostomy care  drain inserted  &amp; removed 4 months ago    History of tonsillectomy    History of hip replacement, total, right        Events of the Last 24h:  Vital Signs Last 24 Hrs  T(C): 37.2 (2021 21:00), Max: 37.8 (2021 04:58)  T(F): 98.9 (2021 21:00), Max: 100 (2021 04:58)  HR: 83 (2021 21:00) (70 - 93)  BP: 114/56 (2021 21:00) (94/53 - 122/64)  BP(mean): --  RR: 18 (2021 21:00) (18 - 18)  SpO2: --        Diet, DASH/TLC:   Sodium & Cholesterol Restricted  Consistent Carbohydrate Evening Snack  Low Fat (LOWFAT) (21 @ 13:03)      I&O's Summary    2021 07:01  -  2021 07:00  --------------------------------------------------------  IN: 50 mL / OUT: 1195 mL / NET: -1145 mL    2021 07:01  -  2021 02:40  --------------------------------------------------------  IN: 0 mL / OUT: 2283 mL / NET: -2283 mL     I&O's Detail    2021 07:  -  2021 07:00  --------------------------------------------------------  IN:    IV PiggyBack: 50 mL  Total IN: 50 mL    OUT:    Accordian (mL): 15 mL    Accordian (mL): 30 mL    Voided (mL): 1150 mL  Total OUT: 1195 mL    Total NET: -1145 mL      2021 07:01  -  2021 02:40  --------------------------------------------------------  IN:  Total IN: 0 mL    OUT:    Accordian (mL): 5 mL    Accordian (mL): 3 mL    Voided (mL): 2275 mL  Total OUT: 2283 mL    Total NET: -2283 mL          MEDICATIONS  (STANDING):  aspirin enteric coated 81 milliGRAM(s) Oral daily  atorvastatin 80 milliGRAM(s) Oral at bedtime  chlorhexidine 4% Liquid 1 Application(s) Topical <User Schedule>  digoxin     Tablet 125 MICROGram(s) Oral daily  DULoxetine 90 milliGRAM(s) Oral daily  enoxaparin Injectable 40 milliGRAM(s) SubCutaneous daily  insulin aspart (NovoLOG) Pump - Peds 1 Each SubCutaneous Continuous Pump  metoprolol succinate ER 25 milliGRAM(s) Oral daily  nafcillin  IVPB 2 Gram(s) IV Intermittent every 4 hours  nafcillin  IVPB      pantoprazole    Tablet 40 milliGRAM(s) Oral before breakfast  prasugrel 10 milliGRAM(s) Oral daily  spironolactone 25 milliGRAM(s) Oral daily    MEDICATIONS  (PRN):  acetaminophen   Tablet .. 650 milliGRAM(s) Oral every 6 hours PRN Temp greater or equal to 38C (100.4F), Mild Pain (1 - 3)  ALPRAZolam 0.5 milliGRAM(s) Oral every 8 hours PRN anxiety  furosemide    Tablet 40 milliGRAM(s) Oral daily PRN fluid overload  melatonin 10 milliGRAM(s) Oral at bedtime PRN Insomnia  morphine  - Injectable 4 milliGRAM(s) IV Push every 4 hours PRN Severe Pain (7 - 10)  oxycodone    5 mG/acetaminophen 325 mG 1 Tablet(s) Oral every 6 hours PRN Moderate Pain (4 - 6)      PHYSICAL EXAM:    GENERAL: NAD    HEENT: NCAT    CHEST/LUNGS: CTAB    HEART: RRR,  No murmurs, rubs, or gallops    ABDOMEN: SNTND +BS    EXTREMITIES:  FROM, No clubbing, cyanosis, or edema, palpable pulse    NEURO: No focal neurological deficits    SKIN: No rashes or lesions    INCISION/WOUNDS:                          9.2    15.15 )-----------( 275      ( 2021 06:14 )             30.3        CBC Full  -  ( 2021 06:14 )  WBC Count : 15.15 K/uL  RBC Count : 4.28 M/uL  Hemoglobin : 9.2 g/dL  Hematocrit : 30.3 %  Platelet Count - Automated : 275 K/uL  Mean Cell Volume : 70.8 fL  Mean Cell Hemoglobin : 21.5 pg  Mean Cell Hemoglobin Concentration : 30.4 g/dL  Auto Neutrophil # : 13.00 K/uL  Auto Lymphocyte # : 0.57 K/uL  Auto Monocyte # : 1.42 K/uL  Auto Eosinophil # : 0.07 K/uL  Auto Basophil # : 0.03 K/uL  Auto Neutrophil % : 85.7 %  Auto Lymphocyte % : 3.8 %  Auto Monocyte % : 9.4 %  Auto Eosinophil % : 0.5 %  Auto Basophil % : 0.2 %               133   |  99    |  25                 Ca: 9.0    BMP:   ----------------------------< 214    M.0   (21 @ 06:14)             4.8    |  21    | 1.0                Ph: 2.9      LFT:     TPro: 6.8 / Alb: 3.5 / TBili: 1.2 / DBili: 0.4 / AST: 22 / ALT: 32 / AlkPhos: 129   (21 @ 06:14)    LIVER FUNCTIONS - ( 2021 06:14 )  Alb: 3.5 g/dL / Pro: 6.8 g/dL / ALK PHOS: 129 U/L / ALT: 32 U/L / AST: 22 U/L / GGT: x                     Culture - Body Fluid with Gram Stain (collected 2021 01:15)  Source: .Body Fluid Synovial Fluid  Gram Stain (2021 11:35):    polymorphonuclear leukocytes per low power field    No organisms seen per oil power field    by cytocentrifuge  Preliminary Report (2021 10:47):    Numerous Staphylococcus aureus Susceptibility to follow.    Culture - Urine (collected 2021 00:31)  Source: .Urine Clean Catch (Midstream)  Final Report (2021 10:57):    <10,000 CFU/mL Normal Urogenital Tricia    Culture - Blood (collected 2021 00:31)  Source: .Blood Blood  Gram Stain (2021 01:48):    Growth in aerobic bottle: Gram Positive Cocci in Clusters  Preliminary Report (2021 20:25):    Growth in aerobic bottle: Staphylococcus aureus    ***Blood Panel PCR results on this specimen are available    approximately 3 hours after the Gram stain result.***    Gram stain, PCR, and/or culture results may not always    correspond due to difference in methodologies.    ************************************************************    This PCR assay was performed by multiplex PCR. This    Assay tests for 66 bacterial and resistance gene targets.    Please refer to the [facility] [employer] test directory    at [email or web address] for details.  Organism: Blood Culture PCR (2021 03:37)  Organism: Blood Culture PCR (2021 03:37)    Culture - Blood (collected 2021 00:31)  Source: .Blood Blood  Gram Stain (2021 12:53):    Growth in aerobic bottle: Gram Positive Cocci in Clusters    Growth in anaerobic bottle: Gram Positive Cocci in Clusters  Preliminary Report (2021 20:25):    Growth in aerobic bottle: Staphylococcus aureus    See previous culture [id number]    Growth in anaerobic bottle: Gram Positive Cocci in Clusters

## 2021-04-28 NOTE — CHART NOTE - NSCHARTNOTEFT_GEN_A_CORE
[employer] BIV ICD interrogated - no events.  Device is functioning properly.   If JOHN PAUL positive for vegetation, device may need to be removed.

## 2021-04-28 NOTE — PROGRESS NOTE ADULT - ASSESSMENT
This isASSESSMENT  65 year old male with PMH of CAD s/p MI, PCI/CABG, CHFrEF (15-20%), has ICD, HTN, celiac disease, DM, neuropathy, chronic b/l LE ulcers presents, severe chronic pain,  hx infected R TKR with MRSA (was eventually cemented so has limited ROM R knee), and history of cholecystostomy tube placement in February 2020 for acute cholecystitis s/p removal in May 2020 with multiple presentations including persistent bilious drainage from the prior tract site as well as a RUQ abdominal abscess at the site s/p drainage who presents with left knee pain    IMPRESSION  #Septic Arthritis of left knee  - s/p arthrocentesis of left knee - consistent with baterial septic infection   - s/p OR washout 4/26 -- purulent fluid in left knee with significant tricompartmental arthritis   - Blood Cx 4/26 MSSA  - OR Cx 4/26 MSSA    #Biliary Drainage from previous perc sudhir site  - Previous Intraabdominal 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 Decreased area of right upper quadrant subcutaneous stranding at site of prior percutaneous cholecystostomy, without evidence of abscess.    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    #CHF s/p ICD  #DM   #Abx allergy: carvedilol (Other)  enalapril (Hives)  Entresto (Other)    Creatinine, Serum: 1.0 mg/dL (04.25.21 @ 22:15)    RECOMMENDATIONS  - agree with nafcillin 2g q 4 hours  - follow-up Blood Cx from 4/27 and 4/28  - planned for JOHN PAUL on Friday  - pain control per primary    Please call or message on [employer] Teams if with any questions.  Spectra [phone]

## 2021-04-28 NOTE — CONSULT NOTE ADULT - SUBJECTIVE AND OBJECTIVE BOX
INTERVENTIONAL RADIOLOGY CONSULT:     Procedure Requested: Fistulogram    HPI:  65 year old male with PMH of CAD s/p MI, PCI/CABG, CHFrEF (15-20%), has ICD, HTN, celiac disease, DM, neuropathy, chronic b/l LE ulcers presents, severe chronic pain,  hx infected R TKR with MRSA (was eventually cemented so has limited ROM R knee), and history of cholecystostomy tube placement in February 2020 for acute cholecystitis s/p removal in May 2020 with multiple presentations including persistent bilious drainage from the prior tract site as well as a RUQ abdominal abscess at the site s/p drainage. He presented to ED with inability to walk/ambulate 2/2 left knee pain. Pt has hx of knee pain and infections. 2 weeks ago pt received a 'steroid' shot for his left knee for pain. 2 days later, his knee began to swell and he was unable to walk or bend the knee. It progressively gotten worse to the point where the pain was unbearable so he presented to the ED.     Pt endorses a weight loss >70 lbs over the last year, there is bilious drainage coming from where he had a prior per sudhir, jaundice in the finger tips and sclera. Recent admission for uncontrollable movements, no diagnosis given. Pt denies f/c/n/v, chest pain, headaches, UTI symptoms     The patient is being followed by Neurology, and he is followed by Cardiology Dr. Lopez in  and Dr. Lilia Caldera in [facility], also by Endo Dr. Cormier at [facility].     (26 Apr 2021 11:34)      PAST MEDICAL & SURGICAL HISTORY:  Status post percutaneous transluminal coronary angioplasty  2 stents    Atherosclerosis of coronary artery  CAD (coronary artery disease)    Osteoarthritis    HLD (hyperlipidemia)    Blood clot due to device, implant, or graft  was on blood thinners    Diabetes  on insulin pump    HTN (hypertension)    CHF (congestive heart failure)  EF ~ 25%    History of celiac disease    Diverticulitis    STEMI (ST elevation myocardial infarction)    Diabetic neuropathy    Anxiety and depression    Other postprocedural status  Fixation hardware in foot LEFT    Stented coronary artery  10/18 heart attack  INFERIOR WALL MI    S/P CABG x 1  2018    S/P TKR (total knee replacement), right  with infection Mrsa   per pt he was cleared from MRSA infection    Surgery, elective  right knee wound debridement    History of open reduction and internal fixation (ORIF) procedure    H/O shoulder surgery  right    AICD (automatic cardioverter/defibrillator) present    Cholecystostomy care  drain inserted 2020 &amp; removed 4 months ago    History of tonsillectomy    History of hip replacement, total, right      MEDICATIONS  (STANDING):  ascorbic acid 500 milliGRAM(s) Oral daily  aspirin enteric coated 81 milliGRAM(s) Oral daily  atorvastatin 80 milliGRAM(s) Oral at bedtime  chlorhexidine 4% Liquid 1 Application(s) Topical <User Schedule>  digoxin     Tablet 125 MICROGram(s) Oral daily  DULoxetine 90 milliGRAM(s) Oral daily  enoxaparin Injectable 40 milliGRAM(s) SubCutaneous daily  insulin aspart (NovoLOG) Pump - Peds 1 Each SubCutaneous Continuous Pump  metoprolol succinate ER 25 milliGRAM(s) Oral daily  multivitamin/minerals 1 Tablet(s) Oral daily  nafcillin  IVPB 2 Gram(s) IV Intermittent every 4 hours  nafcillin  IVPB      pantoprazole    Tablet 40 milliGRAM(s) Oral before breakfast  prasugrel 10 milliGRAM(s) Oral daily  spironolactone 25 milliGRAM(s) Oral daily    MEDICATIONS  (PRN):  acetaminophen   Tablet .. 650 milliGRAM(s) Oral every 6 hours PRN Temp greater or equal to 38C (100.4F), Mild Pain (1 - 3)  ALPRAZolam 0.5 milliGRAM(s) Oral every 8 hours PRN anxiety  furosemide    Tablet 40 milliGRAM(s) Oral daily PRN fluid overload  melatonin 10 milliGRAM(s) Oral at bedtime PRN Insomnia  morphine  - Injectable 4 milliGRAM(s) IV Push every 4 hours PRN Severe Pain (7 - 10)  oxycodone    5 mG/acetaminophen 325 mG 1 Tablet(s) Oral every 6 hours PRN Moderate Pain (4 - 6)      Allergies    ACE inhibitors (Hives)  carvedilol (Other)  enalapril (Hives)  Entresto (Other)    Intolerances      Physical Exam:   Vital Signs Last 24 Hrs  T(C): 36.8 (28 Apr 2021 13:00), Max: 37.2 (27 Apr 2021 21:00)  T(F): 98.3 (28 Apr 2021 13:00), Max: 98.9 (27 Apr 2021 21:00)  HR: 81 (28 Apr 2021 13:00) (70 - 91)  BP: 112/86 (28 Apr 2021 13:00) (94/53 - 129/71)  BP(mean): --  RR: 18 (28 Apr 2021 13:00) (18 - 18)  SpO2: --        Pertinent labs:                      8.9    11.00 )-----------( 284      ( 28 Apr 2021 11:23 )             29.6       04-28    134<L>  |  99  |  25<H>  ----------------------------<  304<H>  4.5   |  23  |  1.0    Ca    8.9      28 Apr 2021 11:23  Phos  2.9     04-27  Mg     2.0     04-28    TPro  6.7  /  Alb  3.4<L>  /  TBili  0.8  /  DBili  x   /  AST  21  /  ALT  31  /  AlkPhos  123<H>  04-28          Radiology & Additional Studies:     Radiology imaging reviewed.       ASSESSMENT AND PLAN:    #Biliary-cutaneous fistula - patient with hx of perc choly now s/p removal with bilious drainage from skin indicative of fistula to GB, also seen on CT.  - no indication for fistulogram  - definitive management would be cholecystectomy; can follow surgery recs  - GI planning ERCP    Thank you for the courtesy of this consult, please call [phone] with any further questions.

## 2021-04-28 NOTE — PROGRESS NOTE ADULT - SUBJECTIVE AND OBJECTIVE BOX
----------Daily Progress Note----------    HISTORY OF PRESENT ILLNESS:  Patient is a 65y old Male who presents with a chief complaint of Septic arthritis (28 Apr 2021 06:37)    Currently admitted to medicine with the primary diagnosis of Knee pain, left       Today is hospital day 2d.     INTERVAL HOSPITAL COURSE / OVERNIGHT EVENTS:    Patient was examined and seen at bedside. This morning he is resting comfortably in bed and reports no new issues or overnight events.     Review of Systems: Otherwise unremarkable     <<<<<PAST MEDICAL & SURGICAL HISTORY>>>>>  Status post percutaneous transluminal coronary angioplasty  2 stents    Atherosclerosis of coronary artery  CAD (coronary artery disease)    Osteoarthritis    HLD (hyperlipidemia)    Blood clot due to device, implant, or graft  was on blood thinners    Diabetes  on insulin pump    HTN (hypertension)    CHF (congestive heart failure)  EF ~ 25%    History of celiac disease    Diverticulitis    STEMI (ST elevation myocardial infarction)    Diabetic neuropathy    Anxiety and depression    Other postprocedural status  Fixation hardware in foot LEFT    Stented coronary artery  10/18 heart attack  INFERIOR WALL MI    S/P CABG x 1  2018    S/P TKR (total knee replacement), right  with infection Mrsa   per pt he was cleared from MRSA infection    Surgery, elective  right knee wound debridement    History of open reduction and internal fixation (ORIF) procedure    H/O shoulder surgery  right    AICD (automatic cardioverter/defibrillator) present    Cholecystostomy care  drain inserted 2020 &amp; removed 4 months ago    History of tonsillectomy    History of hip replacement, total, right      ALLERGIES  ACE inhibitors (Hives)  carvedilol (Other)  enalapril (Hives)  Entresto (Other)      Home Medications:  ALPRAZolam 0.5 mg oral tablet: 1 tab(s) orally every 8 hours (26 Apr 2021 12:11)  aspirin 81 mg oral delayed release tablet: 1 tab(s) orally once a day (26 Apr 2021 12:11)  digoxin 125 mcg (0.125 mg) oral tablet: 1 tab(s) orally once a day (26 Apr 2021 12:11)  DULoxetine: 90 milligram(s) orally once a day (26 Apr 2021 12:11)  insulin: pump; HUMULIN (26 Apr 2021 12:11)  Jardiance 10 mg oral tablet: 1 tab(s) orally once a day (in the morning) (26 Apr 2021 12:11)  melatonin 10 mg oral tablet: 1 tab(s) orally once a day (at bedtime), As needed, Insomnia (26 Apr 2021 12:11)  metoprolol succinate 25 mg oral tablet, extended release: 1 tab(s) orally 2 times a day (26 Apr 2021 12:11)  morphine 15 mg oral tablet: 1 tab(s) orally every 4 hours, As Needed - for severe pain (7 to 10 out of 10) (26 Apr 2021 12:11)  prasugrel 10 mg oral tablet: 1 tab(s) orally once a day (26 Apr 2021 12:11)  spironolactone 25 mg oral tablet: 1 tab(s) orally once a day (26 Apr 2021 12:11)        MEDICATIONS  STANDING MEDICATIONS  aspirin enteric coated 81 milliGRAM(s) Oral daily  atorvastatin 80 milliGRAM(s) Oral at bedtime  chlorhexidine 4% Liquid 1 Application(s) Topical <User Schedule>  digoxin     Tablet 125 MICROGram(s) Oral daily  DULoxetine 90 milliGRAM(s) Oral daily  enoxaparin Injectable 40 milliGRAM(s) SubCutaneous daily  insulin aspart (NovoLOG) Pump - Peds 1 Each SubCutaneous Continuous Pump  metoprolol succinate ER 25 milliGRAM(s) Oral daily  nafcillin  IVPB 2 Gram(s) IV Intermittent every 4 hours  nafcillin  IVPB      pantoprazole    Tablet 40 milliGRAM(s) Oral before breakfast  prasugrel 10 milliGRAM(s) Oral daily  spironolactone 25 milliGRAM(s) Oral daily    PRN MEDICATIONS  acetaminophen   Tablet .. 650 milliGRAM(s) Oral every 6 hours PRN  ALPRAZolam 0.5 milliGRAM(s) Oral every 8 hours PRN  furosemide    Tablet 40 milliGRAM(s) Oral daily PRN  melatonin 10 milliGRAM(s) Oral at bedtime PRN  morphine  - Injectable 4 milliGRAM(s) IV Push every 4 hours PRN  oxycodone    5 mG/acetaminophen 325 mG 1 Tablet(s) Oral every 6 hours PRN    VITALS:  T(F): 97.6  HR: 91  BP: 129/71  RR: 18  SpO2: --    <<<<<LABS>>>>>                        9.2    15.15 )-----------( 275      ( 27 Apr 2021 06:14 )             30.3     04-27    133<L>  |  99  |  25<H>  ----------------------------<  214<H>  4.8   |  21  |  1.0    Ca    9.0      27 Apr 2021 06:14  Phos  2.9     04-27  Mg     2.0     04-27    TPro  6.8  /  Alb  3.5  /  TBili  1.2  /  DBili  0.4<H>  /  AST  22  /  ALT  32  /  AlkPhos  129<H>  04-27              Culture - Body Fluid with Gram Stain (collected 26 Apr 2021 01:15)  Source: .Body Fluid Synovial Fluid  Gram Stain (26 Apr 2021 11:35):    polymorphonuclear leukocytes per low power field    No organisms seen per oil power field    by cytocentrifuge  Preliminary Report (27 Apr 2021 10:47):    Numerous Staphylococcus aureus Susceptibility to follow.    Culture - Urine (collected 26 Apr 2021 00:31)  Source: .Urine Clean Catch (Midstream)  Final Report (27 Apr 2021 10:57):    <10,000 CFU/mL Normal Urogenital Tricia    Culture - Blood (collected 26 Apr 2021 00:31)  Source: .Blood Blood  Gram Stain (27 Apr 2021 01:48):    Growth in aerobic bottle: Gram Positive Cocci in Clusters  Preliminary Report (27 Apr 2021 20:25):    Growth in aerobic bottle: Staphylococcus aureus    ***Blood Panel PCR results on this specimen are available    approximately 3 hours after the Gram stain result.***    Gram stain, PCR, and/or culture results may not always    correspond due to difference in methodologies.    ************************************************************    This PCR assay was performed by multiplex PCR. This    Assay tests for 66 bacterial and resistance gene targets.    Please refer to the [facility] test directory    at [email or web address] for details.  Organism: Blood Culture PCR (27 Apr 2021 03:37)  Organism: Blood Culture PCR (27 Apr 2021 03:37)    Culture - Blood (collected 26 Apr 2021 00:31)  Source: .Blood Blood  Gram Stain (27 Apr 2021 12:53):    Growth in aerobic bottle: Gram Positive Cocci in Clusters    Growth in anaerobic bottle: Gram Positive Cocci in Clusters  Preliminary Report (27 Apr 2021 20:25):    Growth in aerobic bottle: Staphylococcus aureus    See previous culture [id number]    Growth in anaerobic bottle: Gram Positive Cocci in Clusters    [id number]        <<<<<RADIOLOGY>>>>>    <<<<<PHYSICAL EXAM>>>>>  GENERAL: Well developed, well nourished and in no acute distress. Resting comfortably in bed.  HEENT: Normocephalic, atraumatic, mucous membranes moist, EOMI, PERRLA, bilateral sclera anicteric, no conjunctival injection  Neck: Supple, non-tender, no lymphadenopathy.  PULMONARY: Clear to auscultation bilaterally. No rales, rhonchi, or wheezing.  CARDIOVASCULAR: Regular rate and rhythm, S1-S2, no murmurs  GASTROINTESTINAL: Soft, non-tender, non-distended, no guarding.  RENAL: No CVA tenderness.  SKIN/EXTREMITIES: No clubbing or edema  NEUROLOGIC/MUSCULOSKELETAL: AOx4, grossly moving all extremities, no focal deficits.      -----------------------------------------------------------------------------------------------------------------------------------------------------------------------------------------------

## 2021-04-28 NOTE — PROGRESS NOTE ADULT - SUBJECTIVE AND OBJECTIVE BOX
SUBJ: Patient seen and examined. Complains of generalized fatigue.       MEDICATIONS  (STANDING):  ascorbic acid 500 milliGRAM(s) Oral daily  aspirin enteric coated 81 milliGRAM(s) Oral daily  atorvastatin 80 milliGRAM(s) Oral at bedtime  chlorhexidine 4% Liquid 1 Application(s) Topical <User Schedule>  digoxin     Tablet 125 MICROGram(s) Oral daily  DULoxetine 90 milliGRAM(s) Oral daily  enoxaparin Injectable 40 milliGRAM(s) SubCutaneous daily  insulin aspart (NovoLOG) Pump - Peds 1 Each SubCutaneous Continuous Pump  metoprolol succinate ER 25 milliGRAM(s) Oral daily  multivitamin/minerals 1 Tablet(s) Oral daily  nafcillin  IVPB 2 Gram(s) IV Intermittent every 4 hours  nafcillin  IVPB      pantoprazole    Tablet 40 milliGRAM(s) Oral before breakfast  prasugrel 10 milliGRAM(s) Oral daily  spironolactone 25 milliGRAM(s) Oral daily    MEDICATIONS  (PRN):  acetaminophen   Tablet .. 650 milliGRAM(s) Oral every 6 hours PRN Temp greater or equal to 38C (100.4F), Mild Pain (1 - 3)  ALPRAZolam 0.5 milliGRAM(s) Oral every 8 hours PRN anxiety  furosemide    Tablet 40 milliGRAM(s) Oral daily PRN fluid overload  melatonin 10 milliGRAM(s) Oral at bedtime PRN Insomnia  morphine  - Injectable 4 milliGRAM(s) IV Push every 4 hours PRN Severe Pain (7 - 10)  oxycodone    5 mG/acetaminophen 325 mG 1 Tablet(s) Oral every 6 hours PRN Moderate Pain (4 - 6)            Vital Signs Last 24 Hrs  T(C): 36.8 (28 Apr 2021 13:00), Max: 37.2 (27 Apr 2021 21:00)  T(F): 98.3 (28 Apr 2021 13:00), Max: 98.9 (27 Apr 2021 21:00)  HR: 81 (28 Apr 2021 13:00) (70 - 91)  BP: 112/86 (28 Apr 2021 13:00) (94/53 - 129/71)  BP(mean): --  RR: 18 (28 Apr 2021 13:00) (18 - 18)  SpO2: --     REVIEW OF SYSTEMS:    NECK: No pain or stiffness  CARDIOVASCULAR: patient denies chest pain, shortness of breath or palpitations .  Repiratory: No cough or wheezing.  NEUROLOGICAL: No focal deficits to report.  GI: no BRBPR, no N,V,diarrhea.    PSYCHIATRY: normal mood and affect  HEENT: no nasal discharge, no ecchymosis      PHYSICAL EXAM:  GENERAL: NAD  HEAD:  Atraumatic, Normocephalic  NECK: Supple, No JVD  NERVOUS SYSTEM:  Alert & Oriented X3, Good concentration  CHEST/LUNG: Clear to anterior auscultation bilaterally  HEART: Regular rate and rhythm  ABDOMEN: Soft, Nontender, Nondistended;       LABS:                        8.9    11.00 )-----------( 284      ( 28 Apr 2021 11:23 )             29.6     04-28    134<L>  |  99  |  25<H>  ----------------------------<  304<H>  4.5   |  23  |  1.0    Ca    8.9      28 Apr 2021 11:23  Phos  2.9     04-27  Mg     2.0     04-28    TPro  6.7  /  Alb  3.4<L>  /  TBili  0.8  /  DBili  x   /  AST  21  /  ALT  31  /  AlkPhos  123<H>  04-28            I&O's Summary    27 Apr 2021 07:01  -  28 Apr 2021 07:00  --------------------------------------------------------  IN: 300 mL / OUT: 2286 mL / NET: -1986 mL    28 Apr 2021 07:01  -  28 Apr 2021 14:36  --------------------------------------------------------  IN: 0 mL / OUT: 800 mL / NET: -800 mL      BNP  RADIOLOGY & ADDITIONAL STUDIES:    IMPRESSION AND PLAN:    CAD and ICM   Septic Knee  MSSA bacteremia   - Management as per primary team  - Euvolemic continue to monitor I&O  - Continue ASA and Prasugrel   - JOHN PAUL on Friday   - Will follow

## 2021-04-28 NOTE — PROGRESS NOTE ADULT - ATTENDING COMMENTS
Pt. seen/ examined  Labs/ vitals reviewed  Minimal drainage - if stays the same, will remove drains tomorrow  Surgery/ GI intervention for draining abdominal fistula  Will follow

## 2021-04-28 NOTE — PROGRESS NOTE ADULT - ATTENDING COMMENTS
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 S/p hardware removal previously in [facility], at that time treated with vanc/Dapto for 6 weeks, cemented with abx spacer in 9/2020.  - s/p arthrocentesis of left knee - consistent with bacterial septic infection   - s/p OR washout 4/26 - purulent fluid in left knee with significant tricompartmental arthritis   - Blood Cx 4/26 MSSA  - OR Cx 4/26 Staph aureus  - cont nafcillin as per ID  - ortho on board    # bacteremia, staph aureus, likely from septic arthritis, r/o IE  - nava on Friday  - f/u BCx from 4/28     # biliary drainage from perc sudhir site  - Previous Intraabdominal 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 Decreased area of right upper quadrant subcutaneous stranding at site of prior percutaneous cholecystostomy, without evidence of abscess.  - pt needs ERCP and cholecystectomy  - IR consult for possible vac dressing    # persistent pain due to septic arthritis - cont current regimen of opioids, laxatives to avoid constipation    # Chronic iron deficiency anemia with component of ACD - stable Hb      dvt ppx     #Progress Note Handoff  Pending nava on Friday, still on IV ABx, f/u BCx clearance

## 2021-04-28 NOTE — PROGRESS NOTE ADULT - ASSESSMENT
65 year old male with PMH of CAD s/p MI, PCI/CABG, CHFrEF (15-20%), has ICD, HTN, celiac disease, DM, neuropathy, chronic b/l LE ulcers presents, severe chronic pain,  hx infected R TKR with MRSA (was eventually cemented so has limited ROM R knee), and history of cholecystostomy tube placement in February 2020 for acute cholecystitis s/p removal in May 2020 with multiple presentations including persistent bilious drainage from the prior tract site as well as a RUQ abdominal abscess at the site s/p drainage admitted for septic arthritis.     # Septic arthritis  #MSSA Bacteremia   - s/p steroid injection OP   - Knee aspirated 04/26: cloudy, yellow, CPPD crystals present, neutrophils >50K, RBC >8k  - s/p I&D by ortho 04/26  - Aspirate Cx w/ MSSA, Blood Cx w/ MSSA   - ID following: switch to Nafcillin   - TTE : Mild thickening of the anterior and posterior mitral valve leaflets  - Ortho following   - repeat blood cx for clearance     # Bilious Drainage from abdominal wound  -  cholecystostomy tube placement in February 2020 for acute cholecystitis s/p removal in May 2020 with multiple presentations including persistent bilious drainage from the prior tract site as well as a RUQ abdominal abscess at the site s/p drainage  - CT A/P w/ decreased RUQ stranding, no abscess   - spoke w/ patient prefers to follow up OP w/ team at [facility] ; patient refusing hida scan  - GI consulted- no interventions, agree w/ OP follow up     # Cachexia  - pt endorses losing 70+ lbs in the last year and a half  - BMI 23.5  - consult nutrition   - likely multifactorial in the setting of chronic disease     #CAD s/p 2 stent PCI that thrombosed, s/p CABG  #CHFrEF EF 15% s/p AICD  #HLD  - hx of 2 thrombosed stents/MI now s/p CABG   - TTE EF 25%  this adm   - has AICD  - Cardiology consulted: cont aspirin, prasugrel, digoxin, toprol, rosuvastatin, aldactone  - Plan for [facility] on friday given repeat BCX MMSA    #Choreiform Movement Disorder - Etiology Unclear   - Neurology consulted during previous admission:   ddx: sporadic paroxysmal non-kinesogenic choreoathetosis vs celiac-related progressive chorea   - c/w Clonazepam 0.5mg TID   - f/u as o/p with neuro    # LE Neuropathy   - continue Duloxetine     # Microcytic anemia likely anemia of chronic dx- iron studies, trend hgb    # DM2- cont insulin pump     DVT ppx: lovenox   FULL CODE  65 year old male with PMH of CAD s/p MI, PCI/CABG, CHFrEF (15-20%), has ICD, HTN, celiac disease, DM, neuropathy, chronic b/l LE ulcers presents, severe chronic pain,  hx infected R TKR with MRSA (was eventually cemented so has limited ROM R knee), and history of cholecystostomy tube placement in February 2020 for acute cholecystitis s/p removal in May 2020 with multiple presentations including persistent bilious drainage from the prior tract site as well as a RUQ abdominal abscess at the site s/p drainage admitted for septic arthritis.     # Septic arthritis  #MSSA Bacteremia   - s/p steroid injection OP   - Knee aspirated 04/26: cloudy, yellow, CPPD crystals present, neutrophils >50K, RBC >8k  - s/p I&D by ortho 04/26  - Aspirate Cx w/ MSSA, Blood Cx w/ MSSA   - ID following: switch to Nafcillin   - TTE : Mild thickening of the anterior and posterior mitral valve leaflets  - Ortho following   - repeat blood cx for clearance     # Bilious Drainage from abdominal wound  -  cholecystostomy tube placement in February 2020 for acute cholecystitis s/p removal in May 2020 with multiple presentations including persistent bilious drainage from the prior tract site as well as a RUQ abdominal abscess at the site s/p drainage  - CT A/P w/ decreased RUQ stranding, no abscess   - spoke w/ patient prefers to follow up OP w/ team at [facility] ; patient refusing hida scan  - GI consulted- no interventions, agree w/ OP follow up   - Patient does not want no further workup for bilious drainage; wants to follow up at [facility]  - IR was consulted for above: no further intervention; would need cholecystectomy     # Cachexia  - pt endorses losing 70+ lbs in the last year and a half  - BMI 23.5  - consult nutrition   - likely multifactorial in the setting of chronic disease     #CAD s/p 2 stent PCI that thrombosed, s/p CABG  #CHFrEF EF 15% s/p AICD  #HLD  - hx of 2 thrombosed stents/MI now s/p CABG   - TTE EF 25%  this adm   - has AICD  - Cardiology consulted: cont aspirin, prasugrel, digoxin, toprol, rosuvastatin, aldactone  - Plan for [facility] on friday given repeat BCX MMSA    #Choreiform Movement Disorder - Etiology Unclear   - Neurology consulted during previous admission:   ddx: sporadic paroxysmal non-kinesogenic choreoathetosis vs celiac-related progressive chorea   - c/w Clonazepam 0.5mg TID   - f/u as o/p with neuro    # LE Neuropathy   - continue Duloxetine     # Microcytic anemia likely anemia of chronic dx- iron studies, trend hgb    # DM2- was on insulin pump however was reportedly disconnected  - currently on diabetes hospital protocol; sliding scale    DVT ppx: lovenox   FULL CODE  65 year old male with PMH of CAD s/p MI, PCI/CABG, CHFrEF (15-20%), has ICD, HTN, celiac disease, DM, neuropathy, chronic b/l LE ulcers presents, severe chronic pain,  hx infected R TKR with MRSA (was eventually cemented so has limited ROM R knee), and history of cholecystostomy tube placement in February 2020 for acute cholecystitis s/p removal in May 2020 with multiple presentations including persistent bilious drainage from the prior tract site as well as a RUQ abdominal abscess at the site s/p drainage admitted for septic arthritis.     # Septic arthritis, hardware removed previously  #MSSA Bacteremia   - s/p steroid injection OP   - Knee aspirated 04/26: cloudy, yellow, CPPD crystals present, neutrophils >50K, RBC >8k  - s/p I&D by ortho 04/26  - Aspirate Cx w/ MSSA, Blood Cx w/ MSSA   - ID following: switch to Nafcillin   - TTE : Mild thickening of the anterior and posterior mitral valve leaflets  - Ortho following   - repeat blood cx for clearance     # Bilious Drainage from abdominal wound  -  cholecystostomy tube placement in February 2020 for acute cholecystitis s/p removal in May 2020 with multiple presentations including persistent bilious drainage from the prior tract site as well as a RUQ abdominal abscess at the site s/p drainage  - CT A/P w/ decreased RUQ stranding, no abscess   - spoke w/ patient prefers to follow up OP w/ team at [facility] ; patient refusing hida scan  - GI consulted- no interventions, agree w/ OP follow up   - Patient does not want no further workup for bilious drainage; wants to follow up at [facility]  - IR was consulted for above: no further intervention; would need cholecystectomy     # Cachexia  - pt endorses losing 70+ lbs in the last year and a half  - BMI 23.5  - consult nutrition   - likely multifactorial in the setting of chronic disease     #CAD s/p 2 stent PCI that thrombosed, s/p CABG  #CHFrEF EF 15% s/p AICD  #HLD  - hx of 2 thrombosed stents/MI now s/p CABG   - TTE EF 25%  this adm   - has AICD  - Cardiology consulted: cont aspirin, prasugrel, digoxin, toprol, rosuvastatin, aldactone  - Plan for [facility] on friday given repeat BCX MMSA    #Choreiform Movement Disorder - possible SE of medications (Cymbalta)   - Neurology consulted during previous admission:   ddx: sporadic paroxysmal non-kinesogenic choreoathetosis vs celiac-related progressive chorea   - c/w Clonazepam 0.5mg TID   - will cont his meds for now, will monitor    # LE Neuropathy   - continue Duloxetine     # Microcytic anemia likely anemia of chronic dx- iron studies, trend hgb    # DM2- was on insulin pump however was reportedly disconnected  - currently on diabetes hospital protocol; sliding scale    DVT ppx: lovenox   FULL CODE

## 2021-04-29 LAB
24R-OH-CALCIDIOL SERPL-MCNC: 36 NG/ML — SIGNIFICANT CHANGE UP (ref 30–80)
A1C WITH ESTIMATED AVERAGE GLUCOSE RESULT: 9.1 % — HIGH (ref 4–5.6)
ANION GAP SERPL CALC-SCNC: 9 MMOL/L — SIGNIFICANT CHANGE UP (ref 7–14)
BASOPHILS # BLD AUTO: 0.06 K/UL — SIGNIFICANT CHANGE UP (ref 0–0.2)
BASOPHILS NFR BLD AUTO: 0.7 % — SIGNIFICANT CHANGE UP (ref 0–1)
BUN SERPL-MCNC: 22 MG/DL — HIGH (ref 10–20)
CALCIUM SERPL-MCNC: 8.7 MG/DL — SIGNIFICANT CHANGE UP (ref 8.5–10.1)
CHLORIDE SERPL-SCNC: 97 MMOL/L — LOW (ref 98–110)
CO2 SERPL-SCNC: 27 MMOL/L — SIGNIFICANT CHANGE UP (ref 17–32)
CREAT SERPL-MCNC: 1 MG/DL — SIGNIFICANT CHANGE UP (ref 0.7–1.5)
EOSINOPHIL # BLD AUTO: 0.23 K/UL — SIGNIFICANT CHANGE UP (ref 0–0.7)
EOSINOPHIL NFR BLD AUTO: 2.6 % — SIGNIFICANT CHANGE UP (ref 0–8)
ESTIMATED AVERAGE GLUCOSE: 214 MG/DL — HIGH (ref 68–114)
GLUCOSE BLDC GLUCOMTR-MCNC: 154 MG/DL — HIGH (ref 70–99)
GLUCOSE BLDC GLUCOMTR-MCNC: 290 MG/DL — HIGH (ref 70–99)
GLUCOSE BLDC GLUCOMTR-MCNC: 306 MG/DL — HIGH (ref 70–99)
GLUCOSE BLDC GLUCOMTR-MCNC: 316 MG/DL — HIGH (ref 70–99)
GLUCOSE SERPL-MCNC: 311 MG/DL — HIGH (ref 70–99)
GRAM STN FLD: SIGNIFICANT CHANGE UP
HCT VFR BLD CALC: 30 % — LOW (ref 42–52)
HGB BLD-MCNC: 9.1 G/DL — LOW (ref 14–18)
IMM GRANULOCYTES NFR BLD AUTO: 0.5 % — HIGH (ref 0.1–0.3)
LYMPHOCYTES # BLD AUTO: 0.71 K/UL — LOW (ref 1.2–3.4)
LYMPHOCYTES # BLD AUTO: 8.2 % — LOW (ref 20.5–51.1)
MAGNESIUM SERPL-MCNC: 1.8 MG/DL — SIGNIFICANT CHANGE UP (ref 1.8–2.4)
MCHC RBC-ENTMCNC: 21.5 PG — LOW (ref 27–31)
MCHC RBC-ENTMCNC: 30.3 G/DL — LOW (ref 32–37)
MCV RBC AUTO: 70.9 FL — LOW (ref 80–94)
MONOCYTES # BLD AUTO: 1.02 K/UL — HIGH (ref 0.1–0.6)
MONOCYTES NFR BLD AUTO: 11.8 % — HIGH (ref 1.7–9.3)
NEUTROPHILS # BLD AUTO: 6.62 K/UL — HIGH (ref 1.4–6.5)
NEUTROPHILS NFR BLD AUTO: 76.2 % — HIGH (ref 42.2–75.2)
NRBC # BLD: 0 /100 WBCS — SIGNIFICANT CHANGE UP (ref 0–0)
PLATELET # BLD AUTO: 282 K/UL — SIGNIFICANT CHANGE UP (ref 130–400)
POTASSIUM SERPL-MCNC: 3.9 MMOL/L — SIGNIFICANT CHANGE UP (ref 3.5–5)
POTASSIUM SERPL-SCNC: 3.9 MMOL/L — SIGNIFICANT CHANGE UP (ref 3.5–5)
RBC # BLD: 4.23 M/UL — LOW (ref 4.7–6.1)
RBC # FLD: 18.8 % — HIGH (ref 11.5–14.5)
SARS-COV-2 RNA SPEC QL NAA+PROBE: DETECTED
SODIUM SERPL-SCNC: 133 MMOL/L — LOW (ref 135–146)
WBC # BLD: 8.68 K/UL — SIGNIFICANT CHANGE UP (ref 4.8–10.8)
WBC # FLD AUTO: 8.68 K/UL — SIGNIFICANT CHANGE UP (ref 4.8–10.8)

## 2021-04-29 PROCEDURE — 99221 1ST HOSP IP/OBS SF/LOW 40: CPT

## 2021-04-29 PROCEDURE — 99233 SBSQ HOSP IP/OBS HIGH 50: CPT

## 2021-04-29 RX ORDER — INSULIN GLARGINE 100 [IU]/ML
15 INJECTION, SOLUTION SUBCUTANEOUS ONCE
Refills: 0 | Status: DISCONTINUED | OUTPATIENT
Start: 2021-04-29 | End: 2021-04-29

## 2021-04-29 RX ORDER — SENNA PLUS 8.6 MG/1
2 TABLET ORAL ONCE
Refills: 0 | Status: COMPLETED | OUTPATIENT
Start: 2021-04-29 | End: 2021-04-29

## 2021-04-29 RX ORDER — INSULIN GLARGINE 100 [IU]/ML
15 INJECTION, SOLUTION SUBCUTANEOUS ONCE
Refills: 0 | Status: COMPLETED | OUTPATIENT
Start: 2021-04-29 | End: 2021-04-29

## 2021-04-29 RX ORDER — POLYETHYLENE GLYCOL 3350 17 G/17G
17 POWDER, FOR SOLUTION ORAL
Refills: 0 | Status: DISCONTINUED | OUTPATIENT
Start: 2021-04-29 | End: 2021-05-27

## 2021-04-29 RX ORDER — INSULIN GLARGINE 100 [IU]/ML
10 INJECTION, SOLUTION SUBCUTANEOUS AT BEDTIME
Refills: 0 | Status: DISCONTINUED | OUTPATIENT
Start: 2021-04-29 | End: 2021-04-30

## 2021-04-29 RX ADMIN — SPIRONOLACTONE 25 MILLIGRAM(S): 25 TABLET, FILM COATED ORAL at 05:33

## 2021-04-29 RX ADMIN — NAFCILLIN 200 GRAM(S): 10 INJECTION, POWDER, FOR SOLUTION INTRAVENOUS at 01:58

## 2021-04-29 RX ADMIN — ENOXAPARIN SODIUM 40 MILLIGRAM(S): 100 INJECTION SUBCUTANEOUS at 11:08

## 2021-04-29 RX ADMIN — NAFCILLIN 200 GRAM(S): 10 INJECTION, POWDER, FOR SOLUTION INTRAVENOUS at 15:04

## 2021-04-29 RX ADMIN — MORPHINE SULFATE 4 MILLIGRAM(S): 50 CAPSULE, EXTENDED RELEASE ORAL at 08:06

## 2021-04-29 RX ADMIN — ATORVASTATIN CALCIUM 80 MILLIGRAM(S): 80 TABLET, FILM COATED ORAL at 22:27

## 2021-04-29 RX ADMIN — Medication 81 MILLIGRAM(S): at 11:06

## 2021-04-29 RX ADMIN — OXYCODONE AND ACETAMINOPHEN 1 TABLET(S): 5; 325 TABLET ORAL at 17:10

## 2021-04-29 RX ADMIN — SENNA PLUS 2 TABLET(S): 8.6 TABLET ORAL at 22:26

## 2021-04-29 RX ADMIN — MORPHINE SULFATE 4 MILLIGRAM(S): 50 CAPSULE, EXTENDED RELEASE ORAL at 15:05

## 2021-04-29 RX ADMIN — Medication 6 UNIT(S): at 15:04

## 2021-04-29 RX ADMIN — CHLORHEXIDINE GLUCONATE 1 APPLICATION(S): 213 SOLUTION TOPICAL at 06:56

## 2021-04-29 RX ADMIN — Medication 125 MICROGRAM(S): at 05:33

## 2021-04-29 RX ADMIN — Medication 3: at 17:12

## 2021-04-29 RX ADMIN — POLYETHYLENE GLYCOL 3350 17 GRAM(S): 17 POWDER, FOR SOLUTION ORAL at 22:27

## 2021-04-29 RX ADMIN — Medication 4: at 15:03

## 2021-04-29 RX ADMIN — DULOXETINE HYDROCHLORIDE 90 MILLIGRAM(S): 30 CAPSULE, DELAYED RELEASE ORAL at 11:07

## 2021-04-29 RX ADMIN — INSULIN GLARGINE 10 UNIT(S): 100 INJECTION, SOLUTION SUBCUTANEOUS at 22:27

## 2021-04-29 RX ADMIN — Medication 0.5 MILLIGRAM(S): at 10:16

## 2021-04-29 RX ADMIN — Medication 1 TABLET(S): at 11:07

## 2021-04-29 RX ADMIN — Medication 6 UNIT(S): at 17:12

## 2021-04-29 RX ADMIN — NAFCILLIN 200 GRAM(S): 10 INJECTION, POWDER, FOR SOLUTION INTRAVENOUS at 11:05

## 2021-04-29 RX ADMIN — NAFCILLIN 200 GRAM(S): 10 INJECTION, POWDER, FOR SOLUTION INTRAVENOUS at 05:34

## 2021-04-29 RX ADMIN — PANTOPRAZOLE SODIUM 40 MILLIGRAM(S): 20 TABLET, DELAYED RELEASE ORAL at 05:34

## 2021-04-29 RX ADMIN — Medication 25 MILLIGRAM(S): at 05:33

## 2021-04-29 RX ADMIN — INSULIN GLARGINE 15 UNIT(S): 100 INJECTION, SOLUTION SUBCUTANEOUS at 11:05

## 2021-04-29 RX ADMIN — PRASUGREL 10 MILLIGRAM(S): 5 TABLET, FILM COATED ORAL at 11:06

## 2021-04-29 RX ADMIN — Medication 500 MILLIGRAM(S): at 11:07

## 2021-04-29 RX ADMIN — NAFCILLIN 200 GRAM(S): 10 INJECTION, POWDER, FOR SOLUTION INTRAVENOUS at 22:27

## 2021-04-29 RX ADMIN — NAFCILLIN 200 GRAM(S): 10 INJECTION, POWDER, FOR SOLUTION INTRAVENOUS at 17:11

## 2021-04-29 RX ADMIN — OXYCODONE AND ACETAMINOPHEN 1 TABLET(S): 5; 325 TABLET ORAL at 10:15

## 2021-04-29 NOTE — PROGRESS NOTE ADULT - ASSESSMENT
H/O cholecystostomy tube with recurrent bilious drainage from tube tract  Patient admitted with left knee septic arthritis s/p arthroscopy  GI recommends outpatient ERCP  HIDA scan ordered and not done yet  Surgery recommends dry dressing to RUQ, change as needed  No acute surgical intervention can be follow up as outpatient.  Discussed with team and Dr Colon

## 2021-04-29 NOTE — CONSULT NOTE ADULT - SUBJECTIVE AND OBJECTIVE BOX
HPI:  65 year old male with PMH of CAD s/p MI, PCI/CABG, CHFrEF (15-20%), has ICD, HTN, celiac disease, DM, neuropathy, chronic b/l LE ulcers presents, severe chronic pain,  hx infected R TKR with MRSA (was eventually cemented so has limited ROM R knee), and history of cholecystostomy tube placement in February 2020 for acute cholecystitis s/p removal in May 2020 with multiple presentations including persistent bilious drainage from the prior tract site as well as a RUQ abdominal abscess at the site s/p drainage. He presented to ED with inability to walk/ambulate 2/2 left knee pain. Pt has hx of knee pain and infections. 2 weeks ago pt received a 'steroid' shot for his left knee for pain. 2 days later, his knee began to swell and he was unable to walk or bend the knee. It progressively gotten worse to the point where the pain was unbearable so he presented to the ED.     Pt endorses a weight loss >70 lbs over the last year, there is bilious drainage coming from where he had a prior per sudhir, jaundice in the finger tips and sclera. Recent admission for uncontrollable movements, no diagnosis given. Pt denies f/c/n/v, chest pain, headaches, UTI symptoms     In the ED:  Joint was aspirated cloudy yellow fluid was seen and sent for cultures. Neutrophil count >50,000 and RBC >8000. Ortho consulted for septic arthritis, s/p arthroscopic drainage. Surg consulted for bile drainage, HIDA scan, consult GI, possible ERCP. CT A&P, No evidence of acute intra-abdominal pathology. Decreased area of right upper quadrant subcutaneous stranding at site of prior percutaneous cholecystostomy, without evidence of abscess. Vitally stable. Admitted to medicine for medical management.    The patient is being followed by Neurology, and he is followed by Cardiology Dr. Lopez in  and Dr. Lilia Caldera in [facility], also by Endo Dr. Cormier at [facility].     (26 Apr 2021 11:34)      PAST MEDICAL & SURGICAL HISTORY  Status post percutaneous transluminal coronary angioplasty  2 stents    Atherosclerosis of coronary artery  CAD (coronary artery disease)    Osteoarthritis    HLD (hyperlipidemia)    Blood clot due to device, implant, or graft  was on blood thinners    Diabetes  on insulin pump    HTN (hypertension)    CHF (congestive heart failure)  EF ~ 25%    History of celiac disease    Diverticulitis    STEMI (ST elevation myocardial infarction)    Diabetic neuropathy    Anxiety and depression    Other postprocedural status  Fixation hardware in foot LEFT    Stented coronary artery  10/18 heart attack  INFERIOR WALL MI    S/P CABG x 1  2018    S/P TKR (total knee replacement), right  with infection Mrsa   per pt he was cleared from MRSA infection    Surgery, elective  right knee wound debridement    History of open reduction and internal fixation (ORIF) procedure    H/O shoulder surgery  right    AICD (automatic cardioverter/defibrillator) present    Cholecystostomy care  drain inserted 2020 &amp; removed 4 months ago    History of tonsillectomy    History of hip replacement, total, right        FAMILY HISTORY:  FAMILY HISTORY:  Family history of heart disease (Mother)    Family history of allergies  daughter        SOCIAL HISTORY:  []smoker  []Alcohol  []Drug    ALLERGIES:  ACE inhibitors (Hives)  carvedilol (Other)  enalapril (Hives)  Entresto (Other)      MEDICATIONS:  MEDICATIONS  (STANDING):  ascorbic acid 500 milliGRAM(s) Oral daily  aspirin enteric coated 81 milliGRAM(s) Oral daily  atorvastatin 80 milliGRAM(s) Oral at bedtime  chlorhexidine 4% Liquid 1 Application(s) Topical <User Schedule>  dextrose 40% Gel 15 Gram(s) Oral once  dextrose 5%. 1000 milliLiter(s) (50 mL/Hr) IV Continuous <Continuous>  dextrose 5%. 1000 milliLiter(s) (100 mL/Hr) IV Continuous <Continuous>  dextrose 50% Injectable 25 Gram(s) IV Push once  dextrose 50% Injectable 12.5 Gram(s) IV Push once  dextrose 50% Injectable 25 Gram(s) IV Push once  digoxin     Tablet 125 MICROGram(s) Oral daily  DULoxetine 90 milliGRAM(s) Oral daily  enoxaparin Injectable 40 milliGRAM(s) SubCutaneous daily  glucagon  Injectable 1 milliGRAM(s) IntraMuscular once  insulin glargine Injectable (LANTUS) 15 Unit(s) SubCutaneous once  insulin glargine Injectable (LANTUS) 10 Unit(s) SubCutaneous at bedtime  insulin lispro (ADMELOG) corrective regimen sliding scale   SubCutaneous three times a day before meals  insulin lispro Injectable (ADMELOG) 6 Unit(s) SubCutaneous three times a day before meals  metoprolol succinate ER 25 milliGRAM(s) Oral daily  multivitamin/minerals 1 Tablet(s) Oral daily  nafcillin  IVPB 2 Gram(s) IV Intermittent every 4 hours  nafcillin  IVPB      pantoprazole    Tablet 40 milliGRAM(s) Oral before breakfast  prasugrel 10 milliGRAM(s) Oral daily  spironolactone 25 milliGRAM(s) Oral daily    MEDICATIONS  (PRN):  acetaminophen   Tablet .. 650 milliGRAM(s) Oral every 6 hours PRN Temp greater or equal to 38C (100.4F), Mild Pain (1 - 3)  ALPRAZolam 0.5 milliGRAM(s) Oral every 8 hours PRN anxiety  furosemide    Tablet 40 milliGRAM(s) Oral daily PRN fluid overload  melatonin 10 milliGRAM(s) Oral at bedtime PRN Insomnia  morphine  - Injectable 4 milliGRAM(s) IV Push every 4 hours PRN Severe Pain (7 - 10)  oxycodone    5 mG/acetaminophen 325 mG 1 Tablet(s) Oral every 6 hours PRN Moderate Pain (4 - 6)      HOME MEDICATIONS:  Home Medications:  ALPRAZolam 0.5 mg oral tablet: 1 tab(s) orally every 8 hours (26 Apr 2021 12:11)  aspirin 81 mg oral delayed release tablet: 1 tab(s) orally once a day (26 Apr 2021 12:11)  digoxin 125 mcg (0.125 mg) oral tablet: 1 tab(s) orally once a day (26 Apr 2021 12:11)  DULoxetine: 90 milligram(s) orally once a day (26 Apr 2021 12:11)  insulin: pump; HUMULIN (26 Apr 2021 12:11)  Jardiance 10 mg oral tablet: 1 tab(s) orally once a day (in the morning) (26 Apr 2021 12:11)  melatonin 10 mg oral tablet: 1 tab(s) orally once a day (at bedtime), As needed, Insomnia (26 Apr 2021 12:11)  metoprolol succinate 25 mg oral tablet, extended release: 1 tab(s) orally 2 times a day (26 Apr 2021 12:11)  morphine 15 mg oral tablet: 1 tab(s) orally every 4 hours, As Needed - for severe pain (7 to 10 out of 10) (26 Apr 2021 12:11)  prasugrel 10 mg oral tablet: 1 tab(s) orally once a day (26 Apr 2021 12:11)  spironolactone 25 mg oral tablet: 1 tab(s) orally once a day (26 Apr 2021 12:11)      VITALS:   T(F): 98.9 (04-29 @ 05:07), Max: 101 (04-26 @ 21:00)  HR: 84 (04-29 @ 05:07) (68 - 100)  BP: 120/60 (04-29 @ 05:07) (94/53 - 148/67)  BP(mean): --  RR: 18 (04-29 @ 05:07) (12 - 22)  SpO2: 100% (04-26 @ 16:00) (96% - 100%)    I&O's Summary    28 Apr 2021 07:01  -  29 Apr 2021 07:00  --------------------------------------------------------  IN: 0 mL / OUT: 1870 mL / NET: -1870 mL        REVIEW OF SYSTEMS:  CONSTITUTIONAL: No weakness, fevers or chills  EYES: No visual changes  ENT: No vertigo or throat pain   NECK: No pain or stiffness  RESPIRATORY: No cough, wheezing, hemoptysis; No shortness of breath  CARDIOVASCULAR: No chest pain or palpitations  GASTROINTESTINAL: No abdominal or epigastric pain. No nausea, vomiting, or hematemesis; No diarrhea or constipation. No melena or hematochezia.  GENITOURINARY: No Polyuria  NEUROLOGICAL:  No tremors, no Weakness or numbness  SKIN: No itching, no rashes  MSK: no joint pain    PHYSICAL EXAM:  GENERAL: Patient is awake , alert and oriented,  not in acute distress  EYES: No proptosis, no lid lag  NECK: No thyroid enlargement, no palpable nodules , no bruit  LUNGS: Clear to auscultation bilaterally   CARDIOVASCULAR: S1/S2 present, RRR , no murmurs or rubs  ABD: Soft, non-tender, non-distended, +BS  EXT: No FERNANDO  SKIN: No abdominal striae  NEURO: No tremors, DTR 2+    LABS:                        9.1    8.68  )-----------( 282      ( 29 Apr 2021 06:53 )             30.0     04-29    133<L>  |  97<L>  |  22<H>  ----------------------------<  311<H>  3.9   |  27  |  1.0    Ca    8.7      29 Apr 2021 06:53  Mg     1.8     04-29    TPro  6.7  /  Alb  3.4<L>  /  TBili  0.8  /  DBili  x   /  AST  21  /  ALT  31  /  AlkPhos  123<H>  04-28 03-13 Chol 121 LDL -- HDL 39<L> Trig 66  POCT Blood Glucose.: 310 mg/dL (04-28-21 @ 16:49)  POCT Blood Glucose.: 265 mg/dL (04-26-21 @ 10:30)    TSH 1.68; Total T3 77; Free T4 --   HPI:  65 year old male with PMH of CAD s/p MI, PCI/CABG, CHFrEF (15-20%), has ICD, HTN, celiac disease, DM, neuropathy, chronic b/l LE ulcers presents, severe chronic pain,  hx infected R TKR with MRSA (was eventually cemented so has limited ROM R knee), and history of cholecystostomy tube placement in February 2020 for acute cholecystitis s/p removal in May 2020 with multiple presentations including persistent bilious drainage from the prior tract site as well as a RUQ abdominal abscess at the site s/p drainage. He presented to ED with inability to walk/ambulate 2/2 left knee pain. Pt has hx of knee pain and infections. 2 weeks ago pt received a 'steroid' shot for his left knee for pain. 2 days later, his knee began to swell and he was unable to walk or bend the knee. It progressively gotten worse to the point where the pain was unbearable so he presented to the ED.     Pt endorses a weight loss >70 lbs over the last year, there is bilious drainage coming from where he had a prior per sudhir, jaundice in the finger tips and sclera. Recent admission for uncontrollable movements, no diagnosis given. Pt denies f/c/n/v, chest pain, headaches, UTI symptoms     In the ED:  Joint was aspirated cloudy yellow fluid was seen and sent for cultures. Neutrophil count >50,000 and RBC >8000. Ortho consulted for septic arthritis, s/p arthroscopic drainage. Surg consulted for bile drainage, HIDA scan, consult GI, possible ERCP. CT A&P, No evidence of acute intra-abdominal pathology. Decreased area of right upper quadrant subcutaneous stranding at site of prior percutaneous cholecystostomy, without evidence of abscess. Vitally stable. Admitted to medicine for medical management.    The patient is being followed by Neurology, and he is followed by Cardiology Dr. Lopez in  and Dr. Lilia Caldera in [facility], also by Endo Dr. Cormier at [facility].     (26 Apr 2021 11:34)      PAST MEDICAL & SURGICAL HISTORY  Status post percutaneous transluminal coronary angioplasty  2 stents    Atherosclerosis of coronary artery  CAD (coronary artery disease)    Osteoarthritis    HLD (hyperlipidemia)    Blood clot due to device, implant, or graft  was on blood thinners    Diabetes  on insulin pump    HTN (hypertension)    CHF (congestive heart failure)  EF ~ 25%    History of celiac disease    Diverticulitis    STEMI (ST elevation myocardial infarction)    Diabetic neuropathy    Anxiety and depression    Other postprocedural status  Fixation hardware in foot LEFT    Stented coronary artery  10/18 heart attack  INFERIOR WALL MI    S/P CABG x 1  2018    S/P TKR (total knee replacement), right  with infection Mrsa   per pt he was cleared from MRSA infection    Surgery, elective  right knee wound debridement    History of open reduction and internal fixation (ORIF) procedure    H/O shoulder surgery  right    AICD (automatic cardioverter/defibrillator) present    Cholecystostomy care  drain inserted 2020 &amp; removed 4 months ago    History of tonsillectomy    History of hip replacement, total, right        FAMILY HISTORY:  FAMILY HISTORY:  Family history of heart disease (Mother)    Family history of allergies  daughter        SOCIAL HISTORY:  smoker: denies  Alcohol: denies  Drug: denies     ALLERGIES:  ACE inhibitors (Hives)  carvedilol (Other)  enalapril (Hives)  Entresto (Other)      MEDICATIONS:  MEDICATIONS  (STANDING):  ascorbic acid 500 milliGRAM(s) Oral daily  aspirin enteric coated 81 milliGRAM(s) Oral daily  atorvastatin 80 milliGRAM(s) Oral at bedtime  chlorhexidine 4% Liquid 1 Application(s) Topical <User Schedule>  dextrose 40% Gel 15 Gram(s) Oral once  dextrose 5%. 1000 milliLiter(s) (50 mL/Hr) IV Continuous <Continuous>  dextrose 5%. 1000 milliLiter(s) (100 mL/Hr) IV Continuous <Continuous>  dextrose 50% Injectable 25 Gram(s) IV Push once  dextrose 50% Injectable 12.5 Gram(s) IV Push once  dextrose 50% Injectable 25 Gram(s) IV Push once  digoxin     Tablet 125 MICROGram(s) Oral daily  DULoxetine 90 milliGRAM(s) Oral daily  enoxaparin Injectable 40 milliGRAM(s) SubCutaneous daily  glucagon  Injectable 1 milliGRAM(s) IntraMuscular once  insulin glargine Injectable (LANTUS) 15 Unit(s) SubCutaneous once  insulin glargine Injectable (LANTUS) 10 Unit(s) SubCutaneous at bedtime  insulin lispro (ADMELOG) corrective regimen sliding scale   SubCutaneous three times a day before meals  insulin lispro Injectable (ADMELOG) 6 Unit(s) SubCutaneous three times a day before meals  metoprolol succinate ER 25 milliGRAM(s) Oral daily  multivitamin/minerals 1 Tablet(s) Oral daily  nafcillin  IVPB 2 Gram(s) IV Intermittent every 4 hours  nafcillin  IVPB      pantoprazole    Tablet 40 milliGRAM(s) Oral before breakfast  prasugrel 10 milliGRAM(s) Oral daily  spironolactone 25 milliGRAM(s) Oral daily    MEDICATIONS  (PRN):  acetaminophen   Tablet .. 650 milliGRAM(s) Oral every 6 hours PRN Temp greater or equal to 38C (100.4F), Mild Pain (1 - 3)  ALPRAZolam 0.5 milliGRAM(s) Oral every 8 hours PRN anxiety  furosemide    Tablet 40 milliGRAM(s) Oral daily PRN fluid overload  melatonin 10 milliGRAM(s) Oral at bedtime PRN Insomnia  morphine  - Injectable 4 milliGRAM(s) IV Push every 4 hours PRN Severe Pain (7 - 10)  oxycodone    5 mG/acetaminophen 325 mG 1 Tablet(s) Oral every 6 hours PRN Moderate Pain (4 - 6)      HOME MEDICATIONS:  Home Medications:  ALPRAZolam 0.5 mg oral tablet: 1 tab(s) orally every 8 hours (26 Apr 2021 12:11)  aspirin 81 mg oral delayed release tablet: 1 tab(s) orally once a day (26 Apr 2021 12:11)  digoxin 125 mcg (0.125 mg) oral tablet: 1 tab(s) orally once a day (26 Apr 2021 12:11)  DULoxetine: 90 milligram(s) orally once a day (26 Apr 2021 12:11)  insulin: pump; HUMULIN (26 Apr 2021 12:11)  Jardiance 10 mg oral tablet: 1 tab(s) orally once a day (in the morning) (26 Apr 2021 12:11)  melatonin 10 mg oral tablet: 1 tab(s) orally once a day (at bedtime), As needed, Insomnia (26 Apr 2021 12:11)  metoprolol succinate 25 mg oral tablet, extended release: 1 tab(s) orally 2 times a day (26 Apr 2021 12:11)  morphine 15 mg oral tablet: 1 tab(s) orally every 4 hours, As Needed - for severe pain (7 to 10 out of 10) (26 Apr 2021 12:11)  prasugrel 10 mg oral tablet: 1 tab(s) orally once a day (26 Apr 2021 12:11)  spironolactone 25 mg oral tablet: 1 tab(s) orally once a day (26 Apr 2021 12:11)      VITALS:   T(F): 98.9 (04-29 @ 05:07), Max: 101 (04-26 @ 21:00)  HR: 84 (04-29 @ 05:07) (68 - 100)  BP: 120/60 (04-29 @ 05:07) (94/53 - 148/67)  BP(mean): --  RR: 18 (04-29 @ 05:07) (12 - 22)  SpO2: 100% (04-26 @ 16:00) (96% - 100%)    I&O's Summary    28 Apr 2021 07:01  -  29 Apr 2021 07:00  --------------------------------------------------------  IN: 0 mL / OUT: 1870 mL / NET: -1870 mL        REVIEW OF SYSTEMS:  CONSTITUTIONAL: Noted weakness. No fevers or chills  EYES: No visual changes  ENT: No vertigo or throat pain   NECK: No pain or stiffness  RESPIRATORY: No cough, wheezing, hemoptysis; No shortness of breath  CARDIOVASCULAR: No chest pain or palpitations  GASTROINTESTINAL: No abdominal or epigastric pain. No nausea, vomiting, or hematemesis; No diarrhea or constipation. No melena or hematochezia.  GENITOURINARY: No Polyuria  NEUROLOGICAL:  No tremors, noted numbness  SKIN: Bilateral LE ulcers  MSK: no joint pain    PHYSICAL EXAM:  GENERAL: Patient is awake , alert and oriented,  not in acute distress  EYES: No proptosis, no lid lag  NECK: No thyroid enlargement, no palpable nodules   LUNGS: Clear to auscultation bilaterally   CARDIOVASCULAR: S1/S2 present, RRR , no murmurs    ABD: Soft, non-tender, non-distended,   EXT: Bilateral LE ulcers, drain from left knee  SKIN: No abdominal striae  NEURO: No tremors     LABS:                        9.1    8.68  )-----------( 282      ( 29 Apr 2021 06:53 )             30.0     04-29    133<L>  |  97<L>  |  22<H>  ----------------------------<  311<H>  3.9   |  27  |  1.0    Ca    8.7      29 Apr 2021 06:53  Mg     1.8     04-29    TPro  6.7  /  Alb  3.4<L>  /  TBili  0.8  /  DBili  x   /  AST  21  /  ALT  31  /  AlkPhos  123<H>  04-28 03-13 Chol 121 LDL -- HDL 39<L> Trig 66  POCT Blood Glucose.: 310 mg/dL (04-28-21 @ 16:49)  POCT Blood Glucose.: 265 mg/dL (04-26-21 @ 10:30)    TSH 1.68; Total T3 77; Free T4 --   HPI:  65 year old male with PMH of CAD s/p MI, PCI/CABG, CHFrEF (15-20%), has ICD, HTN, celiac disease, DM, neuropathy, chronic b/l LE ulcers presents, severe chronic pain,  hx infected R TKR with MRSA (was eventually cemented so has limited ROM R knee), and history of cholecystostomy tube placement in February 2020 for acute cholecystitis s/p removal in May 2020 with multiple presentations including persistent bilious drainage from the prior tract site as well as a RUQ abdominal abscess at the site s/p drainage. He presented to ED with inability to walk/ambulate 2/2 left knee pain. Pt has hx of knee pain and infections. 2 weeks ago pt received a 'steroid' shot for his left knee for pain. 2 days later, his knee began to swell and he was unable to walk or bend the knee. It progressively gotten worse to the point where the pain was unbearable so he presented to the ED.     Pt endorses a weight loss >70 lbs over the last year, there is bilious drainage coming from where he had a prior per sudhir, jaundice in the finger tips and sclera. Recent admission for uncontrollable movements, no diagnosis given. Pt denies f/c/n/v, chest pain, headaches, UTI symptoms     In the ED:  Joint was aspirated cloudy yellow fluid was seen and sent for cultures. Neutrophil count >50,000 and RBC >8000. Ortho consulted for septic arthritis, s/p arthroscopic drainage. Surg consulted for bile drainage, HIDA scan, consult GI, possible ERCP. CT A&P, No evidence of acute intra-abdominal pathology. Decreased area of right upper quadrant subcutaneous stranding at site of prior percutaneous cholecystostomy, without evidence of abscess. Vitally stable. Admitted to medicine for medical management.    The patient is being followed by Neurology, and he is followed by Cardiology Dr. Lopez in  and Dr. Lilia Caldera in [facility], also by Endo Dr. Cormier at [facility].     (26 Apr 2021 11:34)      PAST MEDICAL & SURGICAL HISTORY  Status post percutaneous transluminal coronary angioplasty  2 stents    Atherosclerosis of coronary artery  CAD (coronary artery disease)    Osteoarthritis    HLD (hyperlipidemia)    Blood clot due to device, implant, or graft  was on blood thinners    Diabetes  on insulin pump    HTN (hypertension)    CHF (congestive heart failure)  EF ~ 25%    History of celiac disease    Diverticulitis    STEMI (ST elevation myocardial infarction)    Diabetic neuropathy    Anxiety and depression    Other postprocedural status  Fixation hardware in foot LEFT    Stented coronary artery  10/18 heart attack  INFERIOR WALL MI    S/P CABG x 1  2018    S/P TKR (total knee replacement), right  with infection Mrsa   per pt he was cleared from MRSA infection    Surgery, elective  right knee wound debridement    History of open reduction and internal fixation (ORIF) procedure    H/O shoulder surgery  right    AICD (automatic cardioverter/defibrillator) present    Cholecystostomy care  drain inserted 2020 &amp; removed 4 months ago    History of tonsillectomy    History of hip replacement, total, right        FAMILY HISTORY:  FAMILY HISTORY:  Family history of heart disease (Mother)    Family history of allergies  daughter        SOCIAL HISTORY:  smoker: denies  Alcohol: denies  Drug: denies     ALLERGIES:  ACE inhibitors (Hives)  carvedilol (Other)  enalapril (Hives)  Entresto (Other)      MEDICATIONS:  MEDICATIONS  (STANDING):  ascorbic acid 500 milliGRAM(s) Oral daily  aspirin enteric coated 81 milliGRAM(s) Oral daily  atorvastatin 80 milliGRAM(s) Oral at bedtime  chlorhexidine 4% Liquid 1 Application(s) Topical <User Schedule>  dextrose 40% Gel 15 Gram(s) Oral once  dextrose 5%. 1000 milliLiter(s) (50 mL/Hr) IV Continuous <Continuous>  dextrose 5%. 1000 milliLiter(s) (100 mL/Hr) IV Continuous <Continuous>  dextrose 50% Injectable 25 Gram(s) IV Push once  dextrose 50% Injectable 12.5 Gram(s) IV Push once  dextrose 50% Injectable 25 Gram(s) IV Push once  digoxin     Tablet 125 MICROGram(s) Oral daily  DULoxetine 90 milliGRAM(s) Oral daily  enoxaparin Injectable 40 milliGRAM(s) SubCutaneous daily  glucagon  Injectable 1 milliGRAM(s) IntraMuscular once  insulin glargine Injectable (LANTUS) 15 Unit(s) SubCutaneous once  insulin glargine Injectable (LANTUS) 10 Unit(s) SubCutaneous at bedtime  insulin lispro (ADMELOG) corrective regimen sliding scale   SubCutaneous three times a day before meals  insulin lispro Injectable (ADMELOG) 6 Unit(s) SubCutaneous three times a day before meals  metoprolol succinate ER 25 milliGRAM(s) Oral daily  multivitamin/minerals 1 Tablet(s) Oral daily  nafcillin  IVPB 2 Gram(s) IV Intermittent every 4 hours  nafcillin  IVPB      pantoprazole    Tablet 40 milliGRAM(s) Oral before breakfast  prasugrel 10 milliGRAM(s) Oral daily  spironolactone 25 milliGRAM(s) Oral daily    MEDICATIONS  (PRN):  acetaminophen   Tablet .. 650 milliGRAM(s) Oral every 6 hours PRN Temp greater or equal to 38C (100.4F), Mild Pain (1 - 3)  ALPRAZolam 0.5 milliGRAM(s) Oral every 8 hours PRN anxiety  furosemide    Tablet 40 milliGRAM(s) Oral daily PRN fluid overload  melatonin 10 milliGRAM(s) Oral at bedtime PRN Insomnia  morphine  - Injectable 4 milliGRAM(s) IV Push every 4 hours PRN Severe Pain (7 - 10)  oxycodone    5 mG/acetaminophen 325 mG 1 Tablet(s) Oral every 6 hours PRN Moderate Pain (4 - 6)      HOME MEDICATIONS:  Home Medications:  ALPRAZolam 0.5 mg oral tablet: 1 tab(s) orally every 8 hours (26 Apr 2021 12:11)  aspirin 81 mg oral delayed release tablet: 1 tab(s) orally once a day (26 Apr 2021 12:11)  digoxin 125 mcg (0.125 mg) oral tablet: 1 tab(s) orally once a day (26 Apr 2021 12:11)  DULoxetine: 90 milligram(s) orally once a day (26 Apr 2021 12:11)  insulin: pump; HUMULIN (26 Apr 2021 12:11)  Jardiance 10 mg oral tablet: 1 tab(s) orally once a day (in the morning) (26 Apr 2021 12:11)  melatonin 10 mg oral tablet: 1 tab(s) orally once a day (at bedtime), As needed, Insomnia (26 Apr 2021 12:11)  metoprolol succinate 25 mg oral tablet, extended release: 1 tab(s) orally 2 times a day (26 Apr 2021 12:11)  morphine 15 mg oral tablet: 1 tab(s) orally every 4 hours, As Needed - for severe pain (7 to 10 out of 10) (26 Apr 2021 12:11)  prasugrel 10 mg oral tablet: 1 tab(s) orally once a day (26 Apr 2021 12:11)  spironolactone 25 mg oral tablet: 1 tab(s) orally once a day (26 Apr 2021 12:11)      VITALS:   T(F): 98.9 (04-29 @ 05:07), Max: 101 (04-26 @ 21:00)  HR: 84 (04-29 @ 05:07) (68 - 100)  BP: 120/60 (04-29 @ 05:07) (94/53 - 148/67)  BP(mean): --  RR: 18 (04-29 @ 05:07) (12 - 22)  SpO2: 100% (04-26 @ 16:00) (96% - 100%)    I&O's Summary    28 Apr 2021 07:01  -  29 Apr 2021 07:00  --------------------------------------------------------  IN: 0 mL / OUT: 1870 mL / NET: -1870 mL        REVIEW OF SYSTEMS:  CONSTITUTIONAL: Noted weakness. No fevers or chills  EYES: No visual changes  ENT: No vertigo or throat pain   NECK: No pain or stiffness  RESPIRATORY: No cough, wheezing, hemoptysis; No shortness of breath  CARDIOVASCULAR: No chest pain or palpitations  GASTROINTESTINAL: No abdominal or epigastric pain. No nausea, vomiting, or hematemesis; No diarrhea or constipation. No melena or hematochezia.  GENITOURINARY: No Polyuria  NEUROLOGICAL:  No tremors, noted numbness  SKIN: Bilateral LE ulcers  MSK: no joint pain    PHYSICAL EXAM:  GENERAL: Patient is awake , alert and oriented,  not in acute distress  EYES: No proptosis, no lid lag  NECK: No thyroid enlargement, no palpable nodules   LUNGS: Clear to auscultation bilaterally   CARDIOVASCULAR: S1/S2 present, RRR , no murmurs    ABD: Soft, non-tender, non-distended,   EXT: Bilateral LE ulcers, drain from left knee         LABS:                        9.1    8.68  )-----------( 282      ( 29 Apr 2021 06:53 )             30.0     04-29    133<L>  |  97<L>  |  22<H>  ----------------------------<  311<H>  3.9   |  27  |  1.0    Ca    8.7      29 Apr 2021 06:53  Mg     1.8     04-29    TPro  6.7  /  Alb  3.4<L>  /  TBili  0.8  /  DBili  x   /  AST  21  /  ALT  31  /  AlkPhos  123<H>  04-28 03-13 Chol 121 LDL -- HDL 39<L> Trig 66  POCT Blood Glucose.: 310 mg/dL (04-28-21 @ 16:49)  POCT Blood Glucose.: 265 mg/dL (04-26-21 @ 10:30)    TSH 1.68; Total T3 77; Free T4 --  A1C with Estimated Average Glucose Result: 9.1: Method: Immunoassay

## 2021-04-29 NOTE — CONSULT NOTE ADULT - ASSESSMENT
65 year old male with PMH of CAD s/p MI, PCI/CABG, CHFrEF (15-20%), has ICD, HTN, celiac disease, DM, neuropathy, chronic b/l LE ulcers presents, severe chronic pain,  infected R TKR with MRSA, presenting for inability to ambulate. Endo consulted given malfunctioning insulin pump.    1.  DM Type 2 - controlled - with neuropathy and retinopathy  Hba1c 8 (12/8/2020)  Cr/GFR 1/97  MUGA scan showed EF 26%  Wt 81 kg with BMI 23.6  Patient can continue to be on insulin pump - Patient signed the insulin pump policy.    2. Hyperlipidemia  - Goal LDL < 70  - Continue statin    Recommendations:  *** 65 year old male with PMH of CAD s/p MI, PCI/CABG, CHFrEF (15-20%), has ICD, HTN, celiac disease, DM, neuropathy, chronic b/l LE ulcers presents, severe chronic pain,  infected R TKR with MRSA, presenting for inability to ambulate. Endo consulted given malfunctioning insulin pump.    1.  DM Type 2 - controlled - with neuropathy and retinopathy  Hba1c 8 (12/8/2020)  Cr/GFR 1/97  MUGA scan showed EF 26%  Wt 81 kg with BMI 23.6  Endo Dr. Cormier at [facility].  Patient can continue to be on insulin pump, on discharge, was taking basal dose of 1.7u/hour (40 units per day)  Currently on Lantus 10 u at bedtime, + lispro 6 units premeals (receiving an extra 15 u of Lantus this morning    2. Hyperlipidemia  - Goal LDL < 70  - Continue statin    Recommendations:  *** 65 year old male with PMH of CAD s/p MI, PCI/CABG, CHFrEF (15-20%), has ICD, HTN, celiac disease, DM, neuropathy, chronic b/l LE ulcers presents, severe chronic pain,  infected R TKR with MRSA, presenting for inability to ambulate. Endo consulted given malfunctioning insulin pump.    1.  DM Type 2 - controlled - with neuropathy and retinopathy  Hba1c 8 (12/8/2020)  Cr/GFR 1/97  MUGA scan showed EF 26%  Wt 81 kg with BMI 23.6  Endo Dr. Cormier at [facility].  Patient can continue to be on insulin pump, on discharge, was taking basal dose of 1.7u/hour (40 units per day)  Currently on Lantus 10 u at bedtime, + lispro 6 units premeals (receiving an extra 15 u of Lantus this morning    2. Hyperlipidemia  - Goal LDL < 70  - Continue statin    Recommendations:  1- If insulin pump infusion site is available this afternoon, resume insulin by insulin pump, basal rate adjusted to 2u/hour, and discontinue Lantus.   2- If infusion site not available, please given Lantus 25 u tonight, and starting tomorrow continue Lantus 45 u at bedtime, in addition to premeals Lipsro 6 units premeals and correction sliding scale 2u for each 50 increase (patient is insulin resistant).

## 2021-04-29 NOTE — PROGRESS NOTE ADULT - ATTENDING COMMENTS
Pt. seen/ examined  Pain improving  WBC count normalized  Drainage minimal, will remove 1 drain  Continue ABx  Discussed with medicine team - awaiting [facility]  Will follow

## 2021-04-29 NOTE — PROGRESS NOTE ADULT - ASSESSMENT
65 year old male with PMH of CAD s/p MI, PCI/CABG, CHFrEF (15-20%), has ICD, HTN, celiac disease, DM, neuropathy, chronic b/l LE ulcers, severe chronic pain,  hx infected R TKR with MRSA (was eventually cemented so has limited ROM R knee), and history of cholecystostomy tube placement in February 2020 for acute cholecystitis s/p removal in May 2020, with fistula formation and persistent bilious drainage from the prior tract site as well as a RUQ abdominal abscess at the site s/p drainage admitted for septic arthritis.     # Septic arthritis of Left knee  -  s/p arthrocentesis of left knee - consistent with bacterial septic infection   - s/p OR washout 4/26 - purulent fluid in left knee with significant tricompartmental arthritis   - Blood Cx 4/26 MSSA, 4/27 NGTD, 4/28 + cocci in clusters  - OR Cx 4/26 Staph aureus  - cont nafcillin as per ID, pt will need long course of ABx. Once BCx clear can put PICC line.  - ortho on board, drain will be removed today    # bacteremia, staph aureus, likely from septic arthritis, r/o IE  - nava on Friday  - f/u BCx from 4/28     # biliary drainage from perc sudhir site  - Previous Intraabdominal 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 Decreased area of right upper quadrant subcutaneous stranding at site of prior percutaneous cholecystostomy, without evidence of abscess.  - pt needs ERCP and cholecystectomy, GI recommended to do it OP  - IR consult for possible vac dressing, they deferred it to surgery, surgery do not recommended vac dressing, just simple dry dressing.    # persistent pain due to septic arthritis - cont current regimen of opioids, laxatives to avoid constipation    # Chronic iron deficiency anemia with component of ACD - stable Hb      dvt ppx     #Progress Note Handoff  Pending nava on Friday, still on IV ABx, f/u BCx clearance.

## 2021-04-29 NOTE — PROGRESS NOTE ADULT - ASSESSMENT
65 year old male with PMH of CAD s/p MI, PCI/CABG, CHFrEF (15-20%), has ICD, HTN, celiac disease, DM, neuropathy, chronic b/l LE ulcers presents, severe chronic pain,  hx infected R TKR with MRSA (was eventually cemented so has limited ROM R knee), and history of cholecystostomy tube placement in February 2020 for acute cholecystitis s/p removal in May 2020 with multiple presentations including persistent bilious drainage from the prior tract site as well as a RUQ abdominal abscess at the site s/p drainage admitted for septic arthritis.     # Septic arthritis, hardware removed previously  #MSSA Bacteremia   - s/p steroid injection OP   - Knee aspirated 04/26: cloudy, yellow, CPPD crystals present, neutrophils >50K, RBC >8k  - s/p I&D by ortho 04/26  - Aspirate Cx w/ MSSA, Blood Cx w/ MSSA   - ID following: on Nafcillin started on 4/28   - TTE : Mild thickening of the anterior and posterior mitral valve leaflets   - repeat bx on 4/27: negative, follow up cultures on 4/28  - pt planned to JOHN PAUL tomorrow 4/30; npo after midnight     # Bilious Drainage from abdominal wound  -  cholecystostomy tube placement in February 2020 for acute cholecystitis s/p removal in May 2020 with multiple presentations including persistent bilious drainage from the prior tract site as well as a RUQ abdominal abscess at the site s/p drainage  - CT A/P w/ decreased RUQ stranding, no abscess   - spoke w/ patient prefers to follow up OP w/ team at [facility] ; patient refusing hida scan  - GI consulted- no interventions, agree w/ OP follow up   - Patient does not want no further workup for bilious drainage; wants to follow up at [facility]  - IR was consulted for above: no further intervention; would need cholecystectomy   - re-consonsulted IR for possible vac dressing at site however suggested to call surgery: surgery team reported vac dressing is not indicated due to current size; continue with daily routine dressings     # Cachexia  - pt endorses losing 70+ lbs in the last year and a half  - BMI 23.5  - consult nutrition   - likely multifactorial in the setting of chronic disease     #CAD s/p 2 stent PCI that thrombosed, s/p CABG  #CHFrEF EF 15% s/p AICD  #HLD  - hx of 2 thrombosed stents/MI now s/p CABG   - TTE EF 25%  this adm   - has AICD  - Cardiology consulted: cont aspirin, prasugrel, digoxin, toprol, rosuvastatin, aldactone  - Plan for JOHN PAUL on friday given repeat BCX MMSA    #Choreiform Movement Disorder - possible SE of medications (Cymbalta)   - Neurology consulted during previous admission:   ddx: sporadic paroxysmal non-kinesogenic choreoathetosis vs celiac-related progressive chorea   - c/w Clonazepam 0.5mg TID   - will cont his meds for now, will monitor    # LE Neuropathy   - continue Duloxetine     # Microcytic anemia likely anemia of chronic dx- iron studies, trend hgb    # DM2- was on insulin pump however was reportedly disconnected  - currently on diabetes hospital protocol; sliding scale    DVT ppx: lovenox   FULL CODE

## 2021-04-29 NOTE — PROGRESS NOTE ADULT - SUBJECTIVE AND OBJECTIVE BOX
GENERAL SURGERY PROGRESS NOTE     FLOWER MARISCAL  65y  Male  Hospital day :3d  POD:  Procedure: Arthroscopic drainage of knee      OVERNIGHT EVENTS:    T(F): 98.9 (04-29-21 @ 05:07), Max: 99.2 (04-29-21 @ 00:49)  HR: 84 (04-29-21 @ 05:07) (81 - 87)  BP: 120/60 (04-29-21 @ 05:07) (112/86 - 120/60)  ABP: --  ABP(mean): --  RR: 18 (04-29-21 @ 05:07) (18 - 18)  SpO2: --    DIET/FLUIDS: ascorbic acid 500 milliGRAM(s) Oral daily  dextrose 5%. 1000 milliLiter(s) IV Continuous <Continuous>  dextrose 5%. 1000 milliLiter(s) IV Continuous <Continuous>  multivitamin/minerals 1 Tablet(s) Oral daily    NG:                                                                                DRAINS:   04-28-21 @ 07:01  -  04-29-21 @ 07:00  --------------------------------------------------------  OUT: 20 mL         GI proph:  pantoprazole    Tablet 40 milliGRAM(s) Oral before breakfast    AC/ proph: aspirin enteric coated 81 milliGRAM(s) Oral daily  enoxaparin Injectable 40 milliGRAM(s) SubCutaneous daily    ABx: nafcillin  IVPB 2 Gram(s) IV Intermittent every 4 hours  nafcillin  IVPB          PHYSICAL EXAM: alert and awake  GENERAL: NAD    HEART: Regular rate and rhythm  ABDOMEN: Soft, small amount of bilious drainage from RUQ wound        POCT Blood Glucose.: 306 mg/dL (29 Apr 2021 09:56)  POCT Blood Glucose.: 310 mg/dL (28 Apr 2021 16:49)                          9.1    8.68  )-----------( 282      ( 29 Apr 2021 06:53 )             30.0       Auto Neutrophil %: 76.2 % (04-29-21 @ 06:53)  Auto Immature Granulocyte %: 0.5 % (04-29-21 @ 06:53)    04-29    133<L>  |  97<L>  |  22<H>  ----------------------------<  311<H>  3.9   |  27  |  1.0      Calcium, Total Serum: 8.7 mg/dL (04-29-21 @ 06:53)      LFTs:             6.7  | 0.8  | 21       ------------------[123     ( 28 Apr 2021 11:23 )  3.4  | x    | 31          Culture - Blood (collected 27 Apr 2021 06:14)  Source: .Blood None  Preliminary Report (28 Apr 2021 13:01):    No growth to date.          RADIOLOGY & ADDITIONAL TESTS:

## 2021-04-29 NOTE — PROGRESS NOTE ADULT - SUBJECTIVE AND OBJECTIVE BOX
FLOWER MARISCAL    Patient is a 65y old  Male who presents with a chief complaint of Septic arthritis (29 Apr 2021 13:21)    INTERVAL HPI/OVERNIGHT EVENTS: no events overnight reported. Pt still c/o pain in Right and Left knees (L >R). Denies abdominal pain.     CONSTITUTIONAL: +generalized weakness, no fevers or chills  EYES/ENT: No visual changes;  No vertigo or throat pain   NECK: No pain or stiffness  RESPIRATORY: No cough, wheezing, hemoptysis; No shortness of breath  CARDIOVASCULAR: No chest pain or palpitations  GASTROINTESTINAL: No abdominal pain. No nausea, vomiting.   GENITOURINARY: No dysuria, frequency or hematuria  NEUROLOGICAL: No numbness or weakness  SKIN: No itching, rashes     PHYSICAL EXAM:  T(C): 36.3, Max: 37.3 (04-29-21 @ 00:49)  HR: 80 (80 - 87)  BP: 114/56 (114/56 - 120/60)  RR: 18 (18 - 18)  SpO2: --    GENERAL: NAD, more awake today and cooperative  NECK: Supple, No JVD  PULMONARY/CHEST: No rales, rhonchi, wheezing  CARDIOVASC: Regular rate and rhythm; No murmurs  GI/ABDOMEN: + Fistula in RUQ, abd soft, nontender, nondistended; Bowel sounds present  EXTREMITIES: Left knee with drain in immobilizer, Right knee enlarged, not tender to palpation, likely has effusion, no LE edema, no calf tenderness.  NERVOUS SYSTEM:  Alert & Oriented X3, no focal deficit     Consultant(s) Notes Reviewed by me.     LABS:                        9.1    8.68  )-----------( 282      ( 29 Apr 2021 06:53 )             30.0     04-29    133<L>  |  97<L>  |  22<H>  ----------------------------<  311<H>  3.9   |  27  |  1.0    Ca    8.7      29 Apr 2021 06:53  Mg     1.8     04-29    TPro  6.7  /  Alb  3.4<L>  /  TBili  0.8  /  DBili  x   /  AST  21  /  ALT  31  /  AlkPhos  123<H>  04-28        CAPILLARY BLOOD GLUCOSE  POCT Blood Glucose.: 290 mg/dL (29 Apr 2021 16:37)  POCT Blood Glucose.: 316 mg/dL (29 Apr 2021 14:35)  POCT Blood Glucose.: 306 mg/dL (29 Apr 2021 09:56)      Culture - Blood (collected 28 Apr 2021 08:13)  Source: .Blood Blood  Gram Stain (29 Apr 2021 15:21):    Growth in anaerobic bottle: Gram Positive Cocci in Clusters  Preliminary Report (29 Apr 2021 15:22):    Growth in anaerobic bottle: Gram Positive Cocci in Clusters    Culture - Blood (collected 28 Apr 2021 08:13)  Source: .Blood Blood  Preliminary Report (29 Apr 2021 13:02):    No growth to date.    Culture - Blood (collected 27 Apr 2021 06:14)  Source: .Blood None  Preliminary Report (28 Apr 2021 13:01):    No growth to date.      RADIOLOGY & ADDITIONAL TESTS:  no new tests      MEDICATIONS  (STANDING):  ascorbic acid 500 milliGRAM(s) Oral daily  aspirin enteric coated 81 milliGRAM(s) Oral daily  atorvastatin 80 milliGRAM(s) Oral at bedtime  chlorhexidine 4% Liquid 1 Application(s) Topical <User Schedule>  dextrose 40% Gel 15 Gram(s) Oral once  dextrose 5%. 1000 milliLiter(s) (50 mL/Hr) IV Continuous <Continuous>  dextrose 5%. 1000 milliLiter(s) (100 mL/Hr) IV Continuous <Continuous>  dextrose 50% Injectable 25 Gram(s) IV Push once  dextrose 50% Injectable 12.5 Gram(s) IV Push once  dextrose 50% Injectable 25 Gram(s) IV Push once  digoxin     Tablet 125 MICROGram(s) Oral daily  DULoxetine 90 milliGRAM(s) Oral daily  enoxaparin Injectable 40 milliGRAM(s) SubCutaneous daily  glucagon  Injectable 1 milliGRAM(s) IntraMuscular once  insulin glargine Injectable (LANTUS) 10 Unit(s) SubCutaneous at bedtime  insulin lispro (ADMELOG) corrective regimen sliding scale   SubCutaneous three times a day before meals  insulin lispro Injectable (ADMELOG) 6 Unit(s) SubCutaneous three times a day before meals  metoprolol succinate ER 25 milliGRAM(s) Oral daily  multivitamin/minerals 1 Tablet(s) Oral daily  nafcillin  IVPB 2 Gram(s) IV Intermittent every 4 hours  nafcillin  IVPB      pantoprazole    Tablet 40 milliGRAM(s) Oral before breakfast  prasugrel 10 milliGRAM(s) Oral daily  spironolactone 25 milliGRAM(s) Oral daily    MEDICATIONS  (PRN):  acetaminophen   Tablet .. 650 milliGRAM(s) Oral every 6 hours PRN Temp greater or equal to 38C (100.4F), Mild Pain (1 - 3)  ALPRAZolam 0.5 milliGRAM(s) Oral every 8 hours PRN anxiety  furosemide    Tablet 40 milliGRAM(s) Oral daily PRN fluid overload  melatonin 10 milliGRAM(s) Oral at bedtime PRN Insomnia  morphine  - Injectable 4 milliGRAM(s) IV Push every 4 hours PRN Severe Pain (7 - 10)  oxycodone    5 mG/acetaminophen 325 mG 1 Tablet(s) Oral every 6 hours PRN Moderate Pain (4 - 6)

## 2021-04-29 NOTE — PROGRESS NOTE ADULT - SUBJECTIVE AND OBJECTIVE BOX
ORTHO PROGRESS NOTE    Interval History:  65M s/p L knee arthroscopic I&D POD3. Continues to complain of L knee pain, pain is slightly improved compared to yesterday.     MEDICATIONS  (STANDING):  aspirin enteric coated 81 milliGRAM(s) Oral daily  atorvastatin 80 milliGRAM(s) Oral at bedtime  chlorhexidine 4% Liquid 1 Application(s) Topical <User Schedule>  digoxin     Tablet 125 MICROGram(s) Oral daily  DULoxetine 90 milliGRAM(s) Oral daily  enoxaparin Injectable 40 milliGRAM(s) SubCutaneous daily  insulin aspart (NovoLOG) Pump - Peds 1 Each SubCutaneous Continuous Pump  metoprolol succinate ER 25 milliGRAM(s) Oral daily  nafcillin  IVPB 2 Gram(s) IV Intermittent every 4 hours  nafcillin  IVPB      pantoprazole    Tablet 40 milliGRAM(s) Oral before breakfast  prasugrel 10 milliGRAM(s) Oral daily  spironolactone 25 milliGRAM(s) Oral daily    MEDICATIONS  (PRN):  acetaminophen   Tablet .. 650 milliGRAM(s) Oral every 6 hours PRN Temp greater or equal to 38C (100.4F), Mild Pain (1 - 3)  ALPRAZolam 0.5 milliGRAM(s) Oral every 8 hours PRN anxiety  furosemide    Tablet 40 milliGRAM(s) Oral daily PRN fluid overload  melatonin 10 milliGRAM(s) Oral at bedtime PRN Insomnia  morphine  - Injectable 4 milliGRAM(s) IV Push every 4 hours PRN Severe Pain (7 - 10)  oxycodone    5 mG/acetaminophen 325 mG 1 Tablet(s) Oral every 6 hours PRN Moderate Pain (4 - 6)    Vital Signs Last 24 Hrs  T(C): 37.2 (29 Apr 2021 05:07), Max: 37.3 (29 Apr 2021 00:49)  T(F): 98.9 (29 Apr 2021 05:07), Max: 99.2 (29 Apr 2021 00:49)  HR: 84 (29 Apr 2021 05:07) (81 - 87)  BP: 120/60 (29 Apr 2021 05:07) (112/86 - 120/60)  BP(mean): --  ABP: --  ABP(mean): --  RR: 18 (29 Apr 2021 05:07) (18 - 18)  SpO2: --    Physical Exam:  LLE: Dressing C/D/I.   Drain 1 mild sanguinous drainage in canister   Drain 2 minimal  SILT s/s/sp/dp/t. Motor intact EHL, TA, Gastroc.   Palpable DP, PT pulses.                        9.2    15.15 )-----------( 275      ( 27 Apr 2021 06:14 )             30.3     133<L>  |  99  |  25<H>  ----------------------------<  214<H>  4.8   |  21  |  1.0    Ca    9.0      27 Apr 2021 06:14  Phos  2.9     04-27  Mg     2.0     04-27  TPro  6.8  /  Alb  3.5  /  TBili  1.2  /  DBili  0.4<H>  /  AST  22  /  ALT  32  /  AlkPhos  129<H>  04-27    A/P: 65M with s/p L knee arthroscopic I&D for septic arthritis POD3.    - WBAT LLE  - Abx   - Outpatient ID f/u at [facility]  - DVT PPX  - Pain control   - Regular diet  - Ortho to follow

## 2021-04-29 NOTE — PROGRESS NOTE ADULT - SUBJECTIVE AND OBJECTIVE BOX
----------Daily Progress Note----------    HISTORY OF PRESENT ILLNESS:  Patient is a 65y old Male who presents with a chief complaint of Septic arthritis (29 Apr 2021 11:47)    Currently admitted to medicine with the primary diagnosis of Knee pain, left       Today is hospital day 3d.     INTERVAL HOSPITAL COURSE / OVERNIGHT EVENTS:    Patient was examined and seen at bedside. This morning he is resting comfortably in bed and reports no new issues or overnight events.     Review of Systems: Otherwise unremarkable     <<<<<PAST MEDICAL & SURGICAL HISTORY>>>>>  Status post percutaneous transluminal coronary angioplasty  2 stents    Atherosclerosis of coronary artery  CAD (coronary artery disease)    Osteoarthritis    HLD (hyperlipidemia)    Blood clot due to device, implant, or graft  was on blood thinners    Diabetes  on insulin pump    HTN (hypertension)    CHF (congestive heart failure)  EF ~ 25%    History of celiac disease    Diverticulitis    STEMI (ST elevation myocardial infarction)    Diabetic neuropathy    Anxiety and depression    Other postprocedural status  Fixation hardware in foot LEFT    Stented coronary artery  10/18 heart attack  INFERIOR WALL MI    S/P CABG x 1  2018    S/P TKR (total knee replacement), right  with infection Mrsa   per pt he was cleared from MRSA infection    Surgery, elective  right knee wound debridement    History of open reduction and internal fixation (ORIF) procedure    H/O shoulder surgery  right    AICD (automatic cardioverter/defibrillator) present    Cholecystostomy care  drain inserted 2020 &amp; removed 4 months ago    History of tonsillectomy    History of hip replacement, total, right      ALLERGIES  ACE inhibitors (Hives)  carvedilol (Other)  enalapril (Hives)  Entresto (Other)      Home Medications:  ALPRAZolam 0.5 mg oral tablet: 1 tab(s) orally every 8 hours (26 Apr 2021 12:11)  aspirin 81 mg oral delayed release tablet: 1 tab(s) orally once a day (26 Apr 2021 12:11)  digoxin 125 mcg (0.125 mg) oral tablet: 1 tab(s) orally once a day (26 Apr 2021 12:11)  DULoxetine: 90 milligram(s) orally once a day (26 Apr 2021 12:11)  insulin: pump; HUMULIN (26 Apr 2021 12:11)  Jardiance 10 mg oral tablet: 1 tab(s) orally once a day (in the morning) (26 Apr 2021 12:11)  melatonin 10 mg oral tablet: 1 tab(s) orally once a day (at bedtime), As needed, Insomnia (26 Apr 2021 12:11)  metoprolol succinate 25 mg oral tablet, extended release: 1 tab(s) orally 2 times a day (26 Apr 2021 12:11)  morphine 15 mg oral tablet: 1 tab(s) orally every 4 hours, As Needed - for severe pain (7 to 10 out of 10) (26 Apr 2021 12:11)  prasugrel 10 mg oral tablet: 1 tab(s) orally once a day (26 Apr 2021 12:11)  spironolactone 25 mg oral tablet: 1 tab(s) orally once a day (26 Apr 2021 12:11)        MEDICATIONS  STANDING MEDICATIONS  ascorbic acid 500 milliGRAM(s) Oral daily  aspirin enteric coated 81 milliGRAM(s) Oral daily  atorvastatin 80 milliGRAM(s) Oral at bedtime  chlorhexidine 4% Liquid 1 Application(s) Topical <User Schedule>  dextrose 40% Gel 15 Gram(s) Oral once  dextrose 5%. 1000 milliLiter(s) IV Continuous <Continuous>  dextrose 5%. 1000 milliLiter(s) IV Continuous <Continuous>  dextrose 50% Injectable 25 Gram(s) IV Push once  dextrose 50% Injectable 12.5 Gram(s) IV Push once  dextrose 50% Injectable 25 Gram(s) IV Push once  digoxin     Tablet 125 MICROGram(s) Oral daily  DULoxetine 90 milliGRAM(s) Oral daily  enoxaparin Injectable 40 milliGRAM(s) SubCutaneous daily  glucagon  Injectable 1 milliGRAM(s) IntraMuscular once  insulin glargine Injectable (LANTUS) 10 Unit(s) SubCutaneous at bedtime  insulin lispro (ADMELOG) corrective regimen sliding scale   SubCutaneous three times a day before meals  insulin lispro Injectable (ADMELOG) 6 Unit(s) SubCutaneous three times a day before meals  metoprolol succinate ER 25 milliGRAM(s) Oral daily  multivitamin/minerals 1 Tablet(s) Oral daily  nafcillin  IVPB 2 Gram(s) IV Intermittent every 4 hours  nafcillin  IVPB      pantoprazole    Tablet 40 milliGRAM(s) Oral before breakfast  prasugrel 10 milliGRAM(s) Oral daily  spironolactone 25 milliGRAM(s) Oral daily    PRN MEDICATIONS  acetaminophen   Tablet .. 650 milliGRAM(s) Oral every 6 hours PRN  ALPRAZolam 0.5 milliGRAM(s) Oral every 8 hours PRN  furosemide    Tablet 40 milliGRAM(s) Oral daily PRN  melatonin 10 milliGRAM(s) Oral at bedtime PRN  morphine  - Injectable 4 milliGRAM(s) IV Push every 4 hours PRN  oxycodone    5 mG/acetaminophen 325 mG 1 Tablet(s) Oral every 6 hours PRN    VITALS:  T(F): 98.9  HR: 84  BP: 120/60  RR: 18  SpO2: --    <<<<<LABS>>>>>                        9.1    8.68  )-----------( 282      ( 29 Apr 2021 06:53 )             30.0     04-29    133<L>  |  97<L>  |  22<H>  ----------------------------<  311<H>  3.9   |  27  |  1.0    Ca    8.7      29 Apr 2021 06:53  Mg     1.8     04-29    TPro  6.7  /  Alb  3.4<L>  /  TBili  0.8  /  DBili  x   /  AST  21  /  ALT  31  /  AlkPhos  123<H>  04-28              Culture - Blood (collected 28 Apr 2021 08:13)  Source: .Blood Blood  Preliminary Report (29 Apr 2021 13:02):    No growth to date.    Culture - Blood (collected 28 Apr 2021 08:13)  Source: .Blood Blood  Preliminary Report (29 Apr 2021 13:02):    No growth to date.    Culture - Blood (collected 27 Apr 2021 06:14)  Source: .Blood None  Preliminary Report (28 Apr 2021 13:01):    No growth to date.    [id number]        <<<<<RADIOLOGY>>>>>    <<<<<PHYSICAL EXAM>>>>>  GENERAL: Well developed, well nourished and in no acute distress. Resting comfortably in bed.  HEENT: Normocephalic, atraumatic, mucous membranes moist, EOMI, PERRLA, bilateral sclera anicteric, no conjunctival injection  Neck: Supple, non-tender, no lymphadenopathy.  PULMONARY: Clear to auscultation bilaterally. No rales, rhonchi, or wheezing.  CARDIOVASCULAR: Regular rate and rhythm, S1-S2, no murmurs  GASTROINTESTINAL: Soft, non-tender, non-distended, no guarding.  RENAL: No CVA tenderness.  SKIN/EXTREMITIES: No clubbing or edema  NEUROLOGIC/MUSCULOSKELETAL: AOx4, grossly moving all extremities, no focal deficits.      -----------------------------------------------------------------------------------------------------------------------------------------------------------------------------------------------

## 2021-04-30 ENCOUNTER — APPOINTMENT (OUTPATIENT)
Dept: CARDIOLOGY | Facility: CLINIC | Age: 66
End: 2021-04-30

## 2021-04-30 LAB
A1C WITH ESTIMATED AVERAGE GLUCOSE RESULT: 9.4 % — HIGH (ref 4–5.6)
ALBUMIN SERPL ELPH-MCNC: 3.5 G/DL — SIGNIFICANT CHANGE UP (ref 3.5–5.2)
ALP SERPL-CCNC: 122 U/L — HIGH (ref 30–115)
ALT FLD-CCNC: 20 U/L — SIGNIFICANT CHANGE UP (ref 0–41)
ANION GAP SERPL CALC-SCNC: 11 MMOL/L — SIGNIFICANT CHANGE UP (ref 7–14)
APTT BLD: 26.1 SEC — LOW (ref 27–39.2)
AST SERPL-CCNC: 16 U/L — SIGNIFICANT CHANGE UP (ref 0–41)
BASOPHILS # BLD AUTO: 0.05 K/UL — SIGNIFICANT CHANGE UP (ref 0–0.2)
BASOPHILS NFR BLD AUTO: 0.3 % — SIGNIFICANT CHANGE UP (ref 0–1)
BILIRUB SERPL-MCNC: 1.1 MG/DL — SIGNIFICANT CHANGE UP (ref 0.2–1.2)
BLD GP AB SCN SERPL QL: SIGNIFICANT CHANGE UP
BUN SERPL-MCNC: 23 MG/DL — HIGH (ref 10–20)
CALCIUM SERPL-MCNC: 9.4 MG/DL — SIGNIFICANT CHANGE UP (ref 8.5–10.1)
CHLORIDE SERPL-SCNC: 96 MMOL/L — LOW (ref 98–110)
CO2 SERPL-SCNC: 28 MMOL/L — SIGNIFICANT CHANGE UP (ref 17–32)
CREAT SERPL-MCNC: 1.1 MG/DL — SIGNIFICANT CHANGE UP (ref 0.7–1.5)
CULTURE RESULTS: SIGNIFICANT CHANGE UP
CULTURE RESULTS: SIGNIFICANT CHANGE UP
EOSINOPHIL # BLD AUTO: 0.16 K/UL — SIGNIFICANT CHANGE UP (ref 0–0.7)
EOSINOPHIL NFR BLD AUTO: 1.1 % — SIGNIFICANT CHANGE UP (ref 0–8)
ESTIMATED AVERAGE GLUCOSE: 223 MG/DL — HIGH (ref 68–114)
GLUCOSE BLDC GLUCOMTR-MCNC: 154 MG/DL — HIGH (ref 70–99)
GLUCOSE BLDC GLUCOMTR-MCNC: 186 MG/DL — HIGH (ref 70–99)
GLUCOSE BLDC GLUCOMTR-MCNC: 202 MG/DL — HIGH (ref 70–99)
GLUCOSE BLDC GLUCOMTR-MCNC: 281 MG/DL — HIGH (ref 70–99)
GLUCOSE SERPL-MCNC: 144 MG/DL — HIGH (ref 70–99)
GRAM STN FLD: SIGNIFICANT CHANGE UP
HCT VFR BLD CALC: 33.4 % — LOW (ref 42–52)
HGB BLD-MCNC: 10 G/DL — LOW (ref 14–18)
IMM GRANULOCYTES NFR BLD AUTO: 0.5 % — HIGH (ref 0.1–0.3)
INR BLD: 1.26 RATIO — SIGNIFICANT CHANGE UP (ref 0.65–1.3)
LYMPHOCYTES # BLD AUTO: 1.13 K/UL — LOW (ref 1.2–3.4)
LYMPHOCYTES # BLD AUTO: 7.8 % — LOW (ref 20.5–51.1)
MAGNESIUM SERPL-MCNC: 2 MG/DL — SIGNIFICANT CHANGE UP (ref 1.8–2.4)
MCHC RBC-ENTMCNC: 21.2 PG — LOW (ref 27–31)
MCHC RBC-ENTMCNC: 29.9 G/DL — LOW (ref 32–37)
MCV RBC AUTO: 70.8 FL — LOW (ref 80–94)
MONOCYTES # BLD AUTO: 1.18 K/UL — HIGH (ref 0.1–0.6)
MONOCYTES NFR BLD AUTO: 8.2 % — SIGNIFICANT CHANGE UP (ref 1.7–9.3)
NEUTROPHILS # BLD AUTO: 11.82 K/UL — HIGH (ref 1.4–6.5)
NEUTROPHILS NFR BLD AUTO: 82.1 % — HIGH (ref 42.2–75.2)
NRBC # BLD: 0 /100 WBCS — SIGNIFICANT CHANGE UP (ref 0–0)
PLATELET # BLD AUTO: 363 K/UL — SIGNIFICANT CHANGE UP (ref 130–400)
POTASSIUM SERPL-MCNC: 3.8 MMOL/L — SIGNIFICANT CHANGE UP (ref 3.5–5)
POTASSIUM SERPL-SCNC: 3.8 MMOL/L — SIGNIFICANT CHANGE UP (ref 3.5–5)
PROT SERPL-MCNC: 7 G/DL — SIGNIFICANT CHANGE UP (ref 6–8)
PROTHROM AB SERPL-ACNC: 14.5 SEC — HIGH (ref 9.95–12.87)
RBC # BLD: 4.72 M/UL — SIGNIFICANT CHANGE UP (ref 4.7–6.1)
RBC # FLD: 18.8 % — HIGH (ref 11.5–14.5)
SODIUM SERPL-SCNC: 135 MMOL/L — SIGNIFICANT CHANGE UP (ref 135–146)
SPECIMEN SOURCE: SIGNIFICANT CHANGE UP
SPECIMEN SOURCE: SIGNIFICANT CHANGE UP
WBC # BLD: 14.41 K/UL — HIGH (ref 4.8–10.8)
WBC # FLD AUTO: 14.41 K/UL — HIGH (ref 4.8–10.8)

## 2021-04-30 PROCEDURE — 99231 SBSQ HOSP IP/OBS SF/LOW 25: CPT

## 2021-04-30 PROCEDURE — 99233 SBSQ HOSP IP/OBS HIGH 50: CPT

## 2021-04-30 RX ADMIN — PANTOPRAZOLE SODIUM 40 MILLIGRAM(S): 20 TABLET, DELAYED RELEASE ORAL at 05:31

## 2021-04-30 RX ADMIN — Medication 500 MILLIGRAM(S): at 12:14

## 2021-04-30 RX ADMIN — SPIRONOLACTONE 25 MILLIGRAM(S): 25 TABLET, FILM COATED ORAL at 05:33

## 2021-04-30 RX ADMIN — MORPHINE SULFATE 4 MILLIGRAM(S): 50 CAPSULE, EXTENDED RELEASE ORAL at 06:49

## 2021-04-30 RX ADMIN — NAFCILLIN 200 GRAM(S): 10 INJECTION, POWDER, FOR SOLUTION INTRAVENOUS at 02:38

## 2021-04-30 RX ADMIN — Medication 1 TABLET(S): at 12:15

## 2021-04-30 RX ADMIN — ATORVASTATIN CALCIUM 80 MILLIGRAM(S): 80 TABLET, FILM COATED ORAL at 21:15

## 2021-04-30 RX ADMIN — Medication 81 MILLIGRAM(S): at 12:14

## 2021-04-30 RX ADMIN — MORPHINE SULFATE 4 MILLIGRAM(S): 50 CAPSULE, EXTENDED RELEASE ORAL at 17:54

## 2021-04-30 RX ADMIN — POLYETHYLENE GLYCOL 3350 17 GRAM(S): 17 POWDER, FOR SOLUTION ORAL at 05:30

## 2021-04-30 RX ADMIN — NAFCILLIN 200 GRAM(S): 10 INJECTION, POWDER, FOR SOLUTION INTRAVENOUS at 05:31

## 2021-04-30 RX ADMIN — MORPHINE SULFATE 4 MILLIGRAM(S): 50 CAPSULE, EXTENDED RELEASE ORAL at 02:52

## 2021-04-30 RX ADMIN — MORPHINE SULFATE 4 MILLIGRAM(S): 50 CAPSULE, EXTENDED RELEASE ORAL at 23:15

## 2021-04-30 RX ADMIN — DULOXETINE HYDROCHLORIDE 90 MILLIGRAM(S): 30 CAPSULE, DELAYED RELEASE ORAL at 12:14

## 2021-04-30 RX ADMIN — POLYETHYLENE GLYCOL 3350 17 GRAM(S): 17 POWDER, FOR SOLUTION ORAL at 17:15

## 2021-04-30 RX ADMIN — NAFCILLIN 200 GRAM(S): 10 INJECTION, POWDER, FOR SOLUTION INTRAVENOUS at 17:14

## 2021-04-30 RX ADMIN — NAFCILLIN 200 GRAM(S): 10 INJECTION, POWDER, FOR SOLUTION INTRAVENOUS at 13:51

## 2021-04-30 RX ADMIN — NAFCILLIN 200 GRAM(S): 10 INJECTION, POWDER, FOR SOLUTION INTRAVENOUS at 21:15

## 2021-04-30 RX ADMIN — Medication 125 MICROGRAM(S): at 05:31

## 2021-04-30 RX ADMIN — CHLORHEXIDINE GLUCONATE 1 APPLICATION(S): 213 SOLUTION TOPICAL at 05:31

## 2021-04-30 RX ADMIN — PRASUGREL 10 MILLIGRAM(S): 5 TABLET, FILM COATED ORAL at 12:14

## 2021-04-30 RX ADMIN — MORPHINE SULFATE 4 MILLIGRAM(S): 50 CAPSULE, EXTENDED RELEASE ORAL at 11:55

## 2021-04-30 RX ADMIN — NAFCILLIN 200 GRAM(S): 10 INJECTION, POWDER, FOR SOLUTION INTRAVENOUS at 10:57

## 2021-04-30 RX ADMIN — MORPHINE SULFATE 4 MILLIGRAM(S): 50 CAPSULE, EXTENDED RELEASE ORAL at 11:24

## 2021-04-30 RX ADMIN — MORPHINE SULFATE 4 MILLIGRAM(S): 50 CAPSULE, EXTENDED RELEASE ORAL at 22:41

## 2021-04-30 RX ADMIN — MORPHINE SULFATE 4 MILLIGRAM(S): 50 CAPSULE, EXTENDED RELEASE ORAL at 18:20

## 2021-04-30 RX ADMIN — Medication 25 MILLIGRAM(S): at 05:30

## 2021-04-30 RX ADMIN — Medication 10 MILLIGRAM(S): at 21:15

## 2021-04-30 RX ADMIN — Medication 0.5 MILLIGRAM(S): at 12:13

## 2021-04-30 NOTE — PROGRESS NOTE ADULT - SUBJECTIVE AND OBJECTIVE BOX
FLOWER MARISCAL  65y, Male  Allergy: ACE inhibitors (Hives)  carvedilol (Other)  enalapril (Hives)  Entresto (Other)      LOS  4d    CHIEF COMPLAINT: Septic arthritis (29 Apr 2021 18:40)      INTERVAL EVENTS/HPI  - No acute events overnight  - Blood Cx from 4/28 are positive  - T(F): , Max: 98.3 (04-30-21 @ 04:56)      - WBC Count: 8.68 (04-29-21 @ 06:53)  WBC Count: 11.00 (04-28-21 @ 11:23)     - Creatinine, Serum: 1.0 (04-29-21 @ 06:53)  Creatinine, Serum: 1.0 (04-28-21 @ 11:23)       ROS  General: Denies rigors, nightsweats  HEENT: Denies headache, rhinorrhea, sore throat, eye pain  CV: Denies CP, palpitations  PULM: Denies wheezing, hemoptysis  GI: Denies hematemesis, hematochezia, melena  : Denies discharge, hematuria  MSK: Denies arthralgias, myalgias  SKIN: Denies rash, lesions  NEURO: Denies paresthesias, weakness  PSYCH: Denies depression, anxiety    VITALS:  T(F): 98.3, Max: 98.3 (04-30-21 @ 04:56)  HR: 80  BP: 108/54  RR: 18Vital Signs Last 24 Hrs  T(C): 36.8 (30 Apr 2021 04:56), Max: 36.8 (30 Apr 2021 04:56)  T(F): 98.3 (30 Apr 2021 04:56), Max: 98.3 (30 Apr 2021 04:56)  HR: 80 (30 Apr 2021 04:56) (80 - 85)  BP: 108/54 (30 Apr 2021 04:56) (108/54 - 134/64)  BP(mean): --  RR: 18 (30 Apr 2021 04:56) (18 - 18)  SpO2: --    PHYSICAL EXAM:  Gen: NAD, resting in bed  HEENT: Normocephalic, atraumatic  Neck: supple, no lymphadenopathy  CV: Regular rate & regular rhythm  Lungs: decreased BS at bases, no fremitus  Abdomen: Soft, BS present  Ext: Warm, well perfused  Neuro: non focal, awake  Skin: no rash, no erythema  Lines: no phlebitis    FH: Non-contributory  Social Hx: Non-contributory    TESTS & MEASUREMENTS:                        9.1    8.68  )-----------( 282      ( 29 Apr 2021 06:53 )             30.0     04-29    133<L>  |  97<L>  |  22<H>  ----------------------------<  311<H>  3.9   |  27  |  1.0    Ca    8.7      29 Apr 2021 06:53  Mg     1.8     04-29    TPro  6.7  /  Alb  3.4<L>  /  TBili  0.8  /  DBili  x   /  AST  21  /  ALT  31  /  AlkPhos  123<H>  04-28      LIVER FUNCTIONS - ( 28 Apr 2021 11:23 )  Alb: 3.4 g/dL / Pro: 6.7 g/dL / ALK PHOS: 123 U/L / ALT: 31 U/L / AST: 21 U/L / GGT: x               Culture - Blood (collected 04-28-21 @ 08:13)  Source: .Blood Blood  Gram Stain (04-29-21 @ 15:21):    Growth in anaerobic bottle: Gram Positive Cocci in Clusters  Preliminary Report (04-29-21 @ 15:22):    Growth in anaerobic bottle: Gram Positive Cocci in Clusters    Culture - Blood (collected 04-28-21 @ 08:13)  Source: .Blood Blood  Gram Stain (04-30-21 @ 02:25):    Growth in anaerobic bottle: Gram Positive Cocci in Clusters  Preliminary Report (04-30-21 @ 02:25):    Growth in anaerobic bottle: Gram Positive Cocci in Clusters    Culture - Blood (collected 04-27-21 @ 06:14)  Source: .Blood None  Preliminary Report (04-28-21 @ 13:01):    No growth to date.    Culture - Surgical Swab (collected 04-26-21 @ 08:52)  Source: .Surgical Swab None  Preliminary Report (04-28-21 @ 15:43):    Moderate Staphylococcus aureus  Organism: Staphylococcus aureus (04-28-21 @ 15:42)  Organism: Staphylococcus aureus (04-28-21 @ 15:42)      -  Ampicillin/Sulbactam: S <=8/4      -  Cefazolin: S <=4      -  Clindamycin: R <=0.25 This isolate is presumed to be clindamycin resistant based on detection of inducible resistance. Clindamycin may still be effective in some patients.      -  Erythromycin: R >4      -  Gentamicin: S <=1 Should not be used as monotherapy      -  Oxacillin: S <=0.25      -  Penicillin: R 4      -  RIF- Rifampin: S <=1 Should not be used as monotherapy      -  Tetra/Doxy: S <=1      -  Trimethoprim/Sulfamethoxazole: S <=0.5/9.5      -  Vancomycin: S 1      Method Type: YOLIE    Culture - Body Fluid with Gram Stain (collected 04-26-21 @ 01:15)  Source: .Body Fluid Synovial Fluid  Gram Stain (04-26-21 @ 11:35):    polymorphonuclear leukocytes per low power field    No organisms seen per oil power field    by cytocentrifuge  Preliminary Report (04-28-21 @ 10:08):    Numerous Staphylococcus aureus  Organism: Staphylococcus aureus (04-28-21 @ 10:05)  Organism: Staphylococcus aureus (04-28-21 @ 10:05)      -  Ampicillin/Sulbactam: S <=8/4      -  Cefazolin: S <=4      -  Clindamycin: R <=0.25 This isolate is presumed to be clindamycin resistant based on detection of inducible resistance. Clindamycin may still be effective in some patients.      -  Erythromycin: R >4      -  Gentamicin: S <=1 Should not be used as monotherapy      -  Oxacillin: S <=0.25      -  Penicillin: R 4      -  RIF- Rifampin: S <=1 Should not be used as monotherapy      -  Tetra/Doxy: S <=1      -  Trimethoprim/Sulfamethoxazole: S <=0.5/9.5      -  Vancomycin: S 2      Method Type: YOLIE    Culture - Urine (collected 04-26-21 @ 00:31)  Source: .Urine Clean Catch (Midstream)  Final Report (04-27-21 @ 10:57):    <10,000 CFU/mL Normal Urogenital Tricia    Culture - Blood (collected 04-26-21 @ 00:31)  Source: .Blood Blood  Gram Stain (04-27-21 @ 01:48):    Growth in aerobic bottle: Gram Positive Cocci in Clusters  Final Report (04-28-21 @ 16:38):    Growth in aerobic bottle: Staphylococcus aureus    ***Blood Panel PCR results on this specimen are available    approximately 3 hours after the Gram stain result.***    Gram stain, PCR, and/or culture results may not always    correspond due to difference in methodologies.    ************************************************************    This PCR assay was performed by multiplex PCR. This    Assay tests for 66 bacterial and resistance gene targets.    Please refer to the [facility] test directory    at [email or web address] for details.  Organism: Blood Culture PCR  Staphylococcus aureus (04-28-21 @ 16:38)  Organism: Staphylococcus aureus (04-28-21 @ 16:38)      -  Ampicillin/Sulbactam: S <=8/4      -  Cefazolin: S <=4      -  Clindamycin: R 0.5 This isolate is presumed to be clindamycin resistant based on detection of inducible resistance. Clindamycin may still be effective in some patients.      -  Erythromycin: R >4      -  Gentamicin: S <=1 Should not be used as monotherapy      -  Oxacillin: S <=0.25      -  Penicillin: R 4      -  RIF- Rifampin: S <=1 Should not be used as monotherapy      -  Tetra/Doxy: S <=1      -  Trimethoprim/Sulfamethoxazole: S <=0.5/9.5      -  Vancomycin: S 2      Method Type: YOLIE  Organism: Blood Culture PCR (04-28-21 @ 16:38)      -  Staphylococcus aureus: Detec Any isolate of Staphylococcus aureus from a blood culture is NOT considered a contaminant.      Method Type: PCR    Culture - Blood (collected 04-26-21 @ 00:31)  Source: .Blood Blood  Gram Stain (04-27-21 @ 12:53):    Growth in aerobic bottle: Gram Positive Cocci in Clusters    Growth in anaerobic bottle: Gram Positive Cocci in Clusters  Final Report (04-28-21 @ 16:39):    Growth in aerobic and anaerobic bottles: Staphylococcus aureus    See previous culture [id number]        Lactate, Blood: 1.4 mmol/L (04-25-21 @ 22:15)      INFECTIOUS DISEASES TESTING  COVID-19 PCR: Detected (04-29-21 @ 14:33)  COVID-19 PCR: NotDetec (04-26-21 @ 06:00)  COVID-19 PCR: NotDetec (03-12-21 @ 11:00)  COVID-19 PCR: Detected (02-05-21 @ 13:47)  COVID-19 PCR: NotDetec (10-13-20 @ 09:08)  COVID-19 PCR: NotDetec (10-03-20 @ 19:54)  COVID-19 PCR: NotDetec (09-01-20 @ 20:40)  COVID-19 PCR: NotDetec (07-30-20 @ 07:43)  COVID-19 PCR: NotDetec (07-16-20 @ 19:46)  COVID-19 PCR: NotDetec (05-17-20 @ 10:18)      INFLAMMATORY MARKERS  C-Reactive Protein, Serum: 108 mg/L (04-25-21 @ 22:15)  Sedimentation Rate, Erythrocyte: 102 mm/Hr (04-25-21 @ 22:15)  Sedimentation Rate, Erythrocyte: 60 mm/Hr (03-12-21 @ 17:00)  C-Reactive Protein, Serum: 7 mg/L (03-12-21 @ 17:00)      RADIOLOGY & ADDITIONAL TESTS:  I have personally reviewed the last available Chest xray  CXR      CT      CARDIOLOGY TESTING  12 Lead ECG:   Ventricular Rate 91 BPM    Atrial Rate 91 BPM    P-R Interval 168 ms    QRS Duration 162 ms    Q-T Interval 380 ms    QTC Calculation(Bazett) 467 ms    P Axis 51 degrees    R Axis -27 degrees    T Axis 134 degrees    Diagnosis Line Atrial-sensed ventricular-paced rhythm  Abnormal ECG    Confirmed by Ze Beebe ([id number]) on 4/28/2021 7:36:41 AM (04-27-21 @ 22:06)      MEDICATIONS  ascorbic acid 500 Oral daily  aspirin enteric coated 81 Oral daily  atorvastatin 80 Oral at bedtime  chlorhexidine 4% Liquid 1 Topical <User Schedule>  dextrose 40% Gel 15 Oral once  dextrose 5%. 1000 IV Continuous <Continuous>  dextrose 5%. 1000 IV Continuous <Continuous>  dextrose 50% Injectable 25 IV Push once  dextrose 50% Injectable 12.5 IV Push once  dextrose 50% Injectable 25 IV Push once  digoxin     Tablet 125 Oral daily  DULoxetine 90 Oral daily  enoxaparin Injectable 40 SubCutaneous daily  glucagon  Injectable 1 IntraMuscular once  insulin glargine Injectable (LANTUS) 10 SubCutaneous at bedtime  insulin lispro (ADMELOG) corrective regimen sliding scale  SubCutaneous three times a day before meals  insulin lispro Injectable (ADMELOG) 6 SubCutaneous three times a day before meals  metoprolol succinate ER 25 Oral daily  multivitamin/minerals 1 Oral daily  nafcillin  IVPB 2 IV Intermittent every 4 hours  nafcillin  IVPB     pantoprazole    Tablet 40 Oral before breakfast  polyethylene glycol 3350 17 Oral two times a day  prasugrel 10 Oral daily  spironolactone 25 Oral daily      WEIGHT  Weight (kg): 81 (04-26-21 @ 17:23)      ANTIBIOTICS:  nafcillin  IVPB 2 Gram(s) IV Intermittent every 4 hours  nafcillin  IVPB          All available historical records have been reviewed       LOIDAFLOWER KLINE  65y, Male  Allergy: ACE inhibitors (Hives)  carvedilol (Other)  enalapril (Hives)  Entresto (Other)      LOS  4d    CHIEF COMPLAINT: Septic arthritis (29 Apr 2021 18:40)      INTERVAL EVENTS/HPI  - No acute events overnight  - Blood Cx from 4/28 are positive  - T(F): , Max: 98.3 (04-30-21 @ 04:56)  - also found to be COVID positive again -- asymptomatic     - WBC Count: 8.68 (04-29-21 @ 06:53)  WBC Count: 11.00 (04-28-21 @ 11:23)     - Creatinine, Serum: 1.0 (04-29-21 @ 06:53)  Creatinine, Serum: 1.0 (04-28-21 @ 11:23)       ROS  General: Denies rigors, nightsweats  HEENT: Denies headache, rhinorrhea, sore throat, eye pain  CV: Denies CP, palpitations  PULM: Denies wheezing, hemoptysis  GI: Denies hematemesis, hematochezia, melena  : Denies discharge, hematuria  MSK: Denies arthralgias, myalgias  SKIN: Denies rash, lesions  NEURO: Denies paresthesias, weakness  PSYCH: Denies depression, anxiety    VITALS:  T(F): 98.3, Max: 98.3 (04-30-21 @ 04:56)  HR: 80  BP: 108/54  RR: 18Vital Signs Last 24 Hrs  T(C): 36.8 (30 Apr 2021 04:56), Max: 36.8 (30 Apr 2021 04:56)  T(F): 98.3 (30 Apr 2021 04:56), Max: 98.3 (30 Apr 2021 04:56)  HR: 80 (30 Apr 2021 04:56) (80 - 85)  BP: 108/54 (30 Apr 2021 04:56) (108/54 - 134/64)  BP(mean): --  RR: 18 (30 Apr 2021 04:56) (18 - 18)  SpO2: --    PHYSICAL EXAM:  Gen: NAD, resting in bed  HEENT: Normocephalic, atraumatic  Neck: supple, no lymphadenopathy  CV: Regular rate & regular rhythm  Lungs: decreased BS at bases, no fremitus  Abdomen: Soft, BS present; fistula with bilious fluid draining   Ext: Warm, well perfused' left knee with 1 drain in place; right LE with superficial ulcers  Neuro: non focal, awake  Skin: no rash, no erythema  Lines: no phlebitis    FH: Non-contributory  Social Hx: Non-contributory    TESTS & MEASUREMENTS:                        9.1    8.68  )-----------( 282      ( 29 Apr 2021 06:53 )             30.0     04-29    133<L>  |  97<L>  |  22<H>  ----------------------------<  311<H>  3.9   |  27  |  1.0    Ca    8.7      29 Apr 2021 06:53  Mg     1.8     04-29    TPro  6.7  /  Alb  3.4<L>  /  TBili  0.8  /  DBili  x   /  AST  21  /  ALT  31  /  AlkPhos  123<H>  04-28      LIVER FUNCTIONS - ( 28 Apr 2021 11:23 )  Alb: 3.4 g/dL / Pro: 6.7 g/dL / ALK PHOS: 123 U/L / ALT: 31 U/L / AST: 21 U/L / GGT: x               Culture - Blood (collected 04-28-21 @ 08:13)  Source: .Blood Blood  Gram Stain (04-29-21 @ 15:21):    Growth in anaerobic bottle: Gram Positive Cocci in Clusters  Preliminary Report (04-29-21 @ 15:22):    Growth in anaerobic bottle: Gram Positive Cocci in Clusters    Culture - Blood (collected 04-28-21 @ 08:13)  Source: .Blood Blood  Gram Stain (04-30-21 @ 02:25):    Growth in anaerobic bottle: Gram Positive Cocci in Clusters  Preliminary Report (04-30-21 @ 02:25):    Growth in anaerobic bottle: Gram Positive Cocci in Clusters    Culture - Blood (collected 04-27-21 @ 06:14)  Source: .Blood None  Preliminary Report (04-28-21 @ 13:01):    No growth to date.    Culture - Surgical Swab (collected 04-26-21 @ 08:52)  Source: .Surgical Swab None  Preliminary Report (04-28-21 @ 15:43):    Moderate Staphylococcus aureus  Organism: Staphylococcus aureus (04-28-21 @ 15:42)  Organism: Staphylococcus aureus (04-28-21 @ 15:42)      -  Ampicillin/Sulbactam: S <=8/4      -  Cefazolin: S <=4      -  Clindamycin: R <=0.25 This isolate is presumed to be clindamycin resistant based on detection of inducible resistance. Clindamycin may still be effective in some patients.      -  Erythromycin: R >4      -  Gentamicin: S <=1 Should not be used as monotherapy      -  Oxacillin: S <=0.25      -  Penicillin: R 4      -  RIF- Rifampin: S <=1 Should not be used as monotherapy      -  Tetra/Doxy: S <=1      -  Trimethoprim/Sulfamethoxazole: S <=0.5/9.5      -  Vancomycin: S 1      Method Type: YOLIE    Culture - Body Fluid with Gram Stain (collected 04-26-21 @ 01:15)  Source: .Body Fluid Synovial Fluid  Gram Stain (04-26-21 @ 11:35):    polymorphonuclear leukocytes per low power field    No organisms seen per oil power field    by cytocentrifuge  Preliminary Report (04-28-21 @ 10:08):    Numerous Staphylococcus aureus  Organism: Staphylococcus aureus (04-28-21 @ 10:05)  Organism: Staphylococcus aureus (04-28-21 @ 10:05)      -  Ampicillin/Sulbactam: S <=8/4      -  Cefazolin: S <=4      -  Clindamycin: R <=0.25 This isolate is presumed to be clindamycin resistant based on detection of inducible resistance. Clindamycin may still be effective in some patients.      -  Erythromycin: R >4      -  Gentamicin: S <=1 Should not be used as monotherapy      -  Oxacillin: S <=0.25      -  Penicillin: R 4      -  RIF- Rifampin: S <=1 Should not be used as monotherapy      -  Tetra/Doxy: S <=1      -  Trimethoprim/Sulfamethoxazole: S <=0.5/9.5      -  Vancomycin: S 2      Method Type: YOLIE    Culture - Urine (collected 04-26-21 @ 00:31)  Source: .Urine Clean Catch (Midstream)  Final Report (04-27-21 @ 10:57):    <10,000 CFU/mL Normal Urogenital Tricia    Culture - Blood (collected 04-26-21 @ 00:31)  Source: .Blood Blood  Gram Stain (04-27-21 @ 01:48):    Growth in aerobic bottle: Gram Positive Cocci in Clusters  Final Report (04-28-21 @ 16:38):    Growth in aerobic bottle: Staphylococcus aureus    ***Blood Panel PCR results on this specimen are available    approximately 3 hours after the Gram stain result.***    Gram stain, PCR, and/or culture results may not always    correspond due to difference in methodologies.    ************************************************************    This PCR assay was performed by multiplex PCR. This    Assay tests for 66 bacterial and resistance gene targets.    Please refer to the [facility] [employer] test directory    at [email or web address] for details.  Organism: Blood Culture PCR  Staphylococcus aureus (04-28-21 @ 16:38)  Organism: Staphylococcus aureus (04-28-21 @ 16:38)      -  Ampicillin/Sulbactam: S <=8/4      -  Cefazolin: S <=4      -  Clindamycin: R 0.5 This isolate is presumed to be clindamycin resistant based on detection of inducible resistance. Clindamycin may still be effective in some patients.      -  Erythromycin: R >4      -  Gentamicin: S <=1 Should not be used as monotherapy      -  Oxacillin: S <=0.25      -  Penicillin: R 4      -  RIF- Rifampin: S <=1 Should not be used as monotherapy      -  Tetra/Doxy: S <=1      -  Trimethoprim/Sulfamethoxazole: S <=0.5/9.5      -  Vancomycin: S 2      Method Type: YOLIE  Organism: Blood Culture PCR (04-28-21 @ 16:38)      -  Staphylococcus aureus: Detec Any isolate of Staphylococcus aureus from a blood culture is NOT considered a contaminant.      Method Type: PCR    Culture - Blood (collected 04-26-21 @ 00:31)  Source: .Blood Blood  Gram Stain (04-27-21 @ 12:53):    Growth in aerobic bottle: Gram Positive Cocci in Clusters    Growth in anaerobic bottle: Gram Positive Cocci in Clusters  Final Report (04-28-21 @ 16:39):    Growth in aerobic and anaerobic bottles: Staphylococcus aureus    See previous culture [id number]        Lactate, Blood: 1.4 mmol/L (04-25-21 @ 22:15)      INFECTIOUS DISEASES TESTING  COVID-19 PCR: Detected (04-29-21 @ 14:33)  COVID-19 PCR: NotDetec (04-26-21 @ 06:00)  COVID-19 PCR: NotDetec (03-12-21 @ 11:00)  COVID-19 PCR: Detected (02-05-21 @ 13:47)  COVID-19 PCR: NotDetec (10-13-20 @ 09:08)  COVID-19 PCR: NotDetec (10-03-20 @ 19:54)  COVID-19 PCR: NotDetec (09-01-20 @ 20:40)  COVID-19 PCR: NotDetec (07-30-20 @ 07:43)  COVID-19 PCR: NotDetec (07-16-20 @ 19:46)  COVID-19 PCR: NotDetec (05-17-20 @ 10:18)      INFLAMMATORY MARKERS  C-Reactive Protein, Serum: 108 mg/L (04-25-21 @ 22:15)  Sedimentation Rate, Erythrocyte: 102 mm/Hr (04-25-21 @ 22:15)  Sedimentation Rate, Erythrocyte: 60 mm/Hr (03-12-21 @ 17:00)  C-Reactive Protein, Serum: 7 mg/L (03-12-21 @ 17:00)      RADIOLOGY & ADDITIONAL TESTS:  I have personally reviewed the last available Chest xray  CXR      CT      CARDIOLOGY TESTING  12 Lead ECG:   Ventricular Rate 91 BPM    Atrial Rate 91 BPM    P-R Interval 168 ms    QRS Duration 162 ms    Q-T Interval 380 ms    QTC Calculation(Bazett) 467 ms    P Axis 51 degrees    R Axis -27 degrees    T Axis 134 degrees    Diagnosis Line Atrial-sensed ventricular-paced rhythm  Abnormal ECG    Confirmed by Ze Beebe ([id number]) on 4/28/2021 7:36:41 AM (04-27-21 @ 22:06)      MEDICATIONS  ascorbic acid 500 Oral daily  aspirin enteric coated 81 Oral daily  atorvastatin 80 Oral at bedtime  chlorhexidine 4% Liquid 1 Topical <User Schedule>  dextrose 40% Gel 15 Oral once  dextrose 5%. 1000 IV Continuous <Continuous>  dextrose 5%. 1000 IV Continuous <Continuous>  dextrose 50% Injectable 25 IV Push once  dextrose 50% Injectable 12.5 IV Push once  dextrose 50% Injectable 25 IV Push once  digoxin     Tablet 125 Oral daily  DULoxetine 90 Oral daily  enoxaparin Injectable 40 SubCutaneous daily  glucagon  Injectable 1 IntraMuscular once  insulin glargine Injectable (LANTUS) 10 SubCutaneous at bedtime  insulin lispro (ADMELOG) corrective regimen sliding scale  SubCutaneous three times a day before meals  insulin lispro Injectable (ADMELOG) 6 SubCutaneous three times a day before meals  metoprolol succinate ER 25 Oral daily  multivitamin/minerals 1 Oral daily  nafcillin  IVPB 2 IV Intermittent every 4 hours  nafcillin  IVPB     pantoprazole    Tablet 40 Oral before breakfast  polyethylene glycol 3350 17 Oral two times a day  prasugrel 10 Oral daily  spironolactone 25 Oral daily      WEIGHT  Weight (kg): 81 (04-26-21 @ 17:23)      ANTIBIOTICS:  nafcillin  IVPB 2 Gram(s) IV Intermittent every 4 hours  nafcillin  IVPB          All available historical records have been reviewed

## 2021-04-30 NOTE — PROGRESS NOTE ADULT - ATTENDING COMMENTS
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   Has significant pain in left knee.     GENERAL: NAD  NECK: Supple, No JVD  PULMONARY/CHEST: No rales, rhonchi, wheezing  CARDIOVASC: Regular rate and rhythm; No murmurs  GI/ABDOMEN: + Fistula in RUQ, abd soft, nontender, nondistended; Bowel sounds present  EXTREMITIES: Left knee with drain in immobilizer, Right knee enlarged, not tender to palpation, likely has effusion, no LE edema, no calf tenderness.  NERVOUS SYSTEM:  Alert & Oriented X3, no focal deficit     65 year old male with PMH of CAD s/p MI, PCI/CABG, CHFrEF (15-20%), has ICD, HTN, celiac disease, DM, neuropathy, chronic b/l LE ulcers, severe chronic pain,  hx infected R TKR with MRSA (was eventually cemented so has limited ROM R knee), and history of cholecystostomy tube placement in February 2020 for acute cholecystitis s/p removal in May 2020, with fistula formation and persistent bilious drainage from the prior tract site as well as a RUQ abdominal abscess at the site s/p drainage admitted for septic arthritis.     # Septic arthritis of Left knee  -  s/p arthrocentesis of left knee - consistent with bacterial septic infection   - s/p OR washout 4/26 - purulent fluid in left knee with significant tricompartmental arthritis   - Blood Cx persitently positive form 4/26 to 4/28 - MSSA,   - OR Cx 4/26 Staph aureus  - cont nafcillin as per ID, pt will need long course of ABx.   - cont daily BCx  - ortho on board, I spoke to ortho, they don't believe repeat washout will be beneficial    # bacteremia, staph aureus, likely from septic arthritis, r/o IE  - nava canceled due to positive COVID test  - will repeat COVID tests on Sunday, pt need NAVA done in light of AICD, if positive AICD should be extracted     # biliary drainage from perc sudhir site  - Previous Intraabdominal 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 Decreased area of right upper quadrant subcutaneous stranding at site of prior percutaneous cholecystostomy, without evidence of abscess.  - pt needs ERCP and cholecystectomy, GI recommended to do it OP  - IR consult for possible vac dressing, they deferred it to surgery, surgery do not recommended vac dressing, just simple dry dressing.    # Positive COVID PCR - pt has high AB titer, had COVID and had vaccine, no clinical signs of infection, no need for isolation    # persistent pain due to septic arthritis - cont current regimen of opioids, laxatives to avoid constipation    # Chronic iron deficiency anemia with component of ACD - stable Hb      dvt ppx     #Progress Note Handoff  Pending BCx clearance. Still acute.

## 2021-04-30 NOTE — PROGRESS NOTE ADULT - SUBJECTIVE AND OBJECTIVE BOX
SUBJ: Patient seen and examined. No events overnight. He feels better, no fever or chills.      MEDICATIONS  (STANDING):  ascorbic acid 500 milliGRAM(s) Oral daily  aspirin enteric coated 81 milliGRAM(s) Oral daily  atorvastatin 80 milliGRAM(s) Oral at bedtime  chlorhexidine 4% Liquid 1 Application(s) Topical <User Schedule>  dextrose 40% Gel 15 Gram(s) Oral once  dextrose 5%. 1000 milliLiter(s) (50 mL/Hr) IV Continuous <Continuous>  dextrose 5%. 1000 milliLiter(s) (100 mL/Hr) IV Continuous <Continuous>  dextrose 50% Injectable 25 Gram(s) IV Push once  dextrose 50% Injectable 12.5 Gram(s) IV Push once  dextrose 50% Injectable 25 Gram(s) IV Push once  digoxin     Tablet 125 MICROGram(s) Oral daily  DULoxetine 90 milliGRAM(s) Oral daily  glucagon  Injectable 1 milliGRAM(s) IntraMuscular once  metoprolol succinate ER 25 milliGRAM(s) Oral daily  multivitamin/minerals 1 Tablet(s) Oral daily  nafcillin  IVPB 2 Gram(s) IV Intermittent every 4 hours  nafcillin  IVPB      pantoprazole    Tablet 40 milliGRAM(s) Oral before breakfast  polyethylene glycol 3350 17 Gram(s) Oral two times a day  prasugrel 10 milliGRAM(s) Oral daily  spironolactone 25 milliGRAM(s) Oral daily    MEDICATIONS  (PRN):  acetaminophen   Tablet .. 650 milliGRAM(s) Oral every 6 hours PRN Temp greater or equal to 38C (100.4F), Mild Pain (1 - 3)  ALPRAZolam 0.5 milliGRAM(s) Oral every 8 hours PRN anxiety  furosemide    Tablet 40 milliGRAM(s) Oral daily PRN fluid overload  melatonin 10 milliGRAM(s) Oral at bedtime PRN Insomnia  morphine  - Injectable 4 milliGRAM(s) IV Push every 4 hours PRN Severe Pain (7 - 10)  oxycodone    5 mG/acetaminophen 325 mG 1 Tablet(s) Oral every 6 hours PRN Moderate Pain (4 - 6)            Vital Signs Last 24 Hrs  T(C): 36.8 (30 Apr 2021 04:56), Max: 36.8 (30 Apr 2021 04:56)  T(F): 98.3 (30 Apr 2021 04:56), Max: 98.3 (30 Apr 2021 04:56)  HR: 80 (30 Apr 2021 04:56) (80 - 85)  BP: 108/54 (30 Apr 2021 04:56) (108/54 - 134/64)  BP(mean): --  RR: 18 (30 Apr 2021 04:56) (18 - 18)  SpO2: --     REVIEW OF SYSTEMS:  CONSTITUTIONAL: No fever  NECK: No pain or stiffness  CARDIOVASCULAR: patient denies chest pain, shortness of breath or palpitations .  Repiratory: No cough or wheezing.  NEUROLOGICAL: No focal deficits to report.  GI: no BRBPR, no N,V,diarrhea.    PSYCHIATRY: normal mood and affect  HEENT: no nasal discharge, no ecchymosis        PHYSICAL EXAM:  GENERAL: NAD  HEAD:  Atraumatic, Normocephalic  NECK: Supple, No JVD  NERVOUS SYSTEM:  Alert & Oriented X3, Good concentration  CHEST/LUNG: Clear to anterior auscultation bilaterally  HEART: Regular rate and rhythm  ABDOMEN: Soft, Nontender, Nondistended;   EXTREMITIES:  No  edema        LABS:                        9.1    8.68  )-----------( 282      ( 29 Apr 2021 06:53 )             30.0     04-29    133<L>  |  97<L>  |  22<H>  ----------------------------<  311<H>  3.9   |  27  |  1.0    Ca    8.7      29 Apr 2021 06:53  Mg     1.8     04-29              I&O's Summary    29 Apr 2021 07:01  -  30 Apr 2021 07:00  --------------------------------------------------------  IN: 0 mL / OUT: 982 mL / NET: -982 mL      BNP  RADIOLOGY & ADDITIONAL STUDIES:    IMPRESSION AND PLAN:    CAD and ICM   Septic Knee  MSSA bacteremia   - Management as per primary team  - Euvolemic continue to monitor I&O  - Continue ASA and Prasugrel   - JOHN PAUL canceled for today given COVID19 positive  - Will follow

## 2021-04-30 NOTE — PROGRESS NOTE ADULT - ASSESSMENT
65 year old male with PMH of CAD s/p MI, PCI/CABG, CHFrEF (15-20%), has ICD, HTN, celiac disease, DM, neuropathy, chronic b/l LE ulcers presents, severe chronic pain,  hx infected R TKR with MRSA (was eventually cemented so has limited ROM R knee), and history of cholecystostomy tube placement in February 2020 for acute cholecystitis s/p removal in May 2020 with multiple presentations including persistent bilious drainage from the prior tract site as well as a RUQ abdominal abscess at the site s/p drainage admitted for septic arthritis.     # Septic arthritis, hardware removed previously  #MSSA Bacteremia   - s/p steroid injection OP   - Knee aspirated 04/26: cloudy, yellow, CPPD crystals present, neutrophils >50K, RBC >8k  - s/p I&D by ortho 04/26  - Aspirate Cx w/ MSSA, Blood Cx w/ MSSA   - ID following: on Nafcillin started on 4/28   - TTE : Mild thickening of the anterior and posterior mitral valve leaflets   - repeat bx on 4/27: negative, cultures on 4/28 prelim for Gram Positive Cocci in Clusters      - pt is npo for JOHN PAUL today; f/u results and cardiology recommendations  - plan for ortho to remove drain today     # Bilious Drainage from abdominal wound  -  cholecystostomy tube placement in February 2020 for acute cholecystitis s/p removal in May 2020 with multiple presentations including persistent bilious drainage from the prior tract site as well as a RUQ abdominal abscess at the site s/p drainage  - CT A/P w/ decreased RUQ stranding, no abscess   - spoke w/ patient prefers to follow up OP w/ team at [facility] ; patient refusing hida scan  - GI consulted- no interventions, agree w/ OP follow up   - Patient does not want no further workup for bilious drainage; wants to follow up at [facility]  - IR was consulted for above: no further intervention; would need cholecystectomy   - re-consonsulted IR for possible vac dressing at site however suggested to call surgery: surgery team reported vac dressing is not indicated due to current size; continue with daily routine dressings     # Cachexia  - pt endorses losing 70+ lbs in the last year and a half  - BMI 23.5  - consult nutrition   - likely multifactorial in the setting of chronic disease     #CAD s/p 2 stent PCI that thrombosed, s/p CABG  #CHFrEF EF 15% s/p AICD  #HLD  - hx of 2 thrombosed stents/MI now s/p CABG   - TTE EF 25%  this adm   - has AICD  - Cardiology consulted: cont aspirin, prasugrel, digoxin, toprol, rosuvastatin, aldactone  - Plan for JOHN PAUL on friday given repeat BCX MMSA    #Choreiform Movement Disorder - possible SE of medications (Cymbalta)   - Neurology consulted during previous admission:   ddx: sporadic paroxysmal non-kinesogenic choreoathetosis vs celiac-related progressive chorea   - c/w Clonazepam 0.5mg TID   - will cont his meds for now, will monitor    # LE Neuropathy   - continue Duloxetine     # Microcytic anemia likely anemia of chronic dx- iron studies, trend hgb    # DM2- was on insulin pump however was reportedly disconnected  - currently on diabetes hospital protocol; sliding scale  - insulin increased to 25 yesterday but patient brought missing piece of pump back: if pump not functioning endo consult reccs appreciated Lantus 45 units at bedtime , start lispro 6 units TIDAc with ISS 2:50 >150 TIDAC .     DVT ppx: lovenox   FULL CODE    65 year old male with PMH of CAD s/p MI, PCI/CABG, CHFrEF (15-20%), has ICD, HTN, celiac disease, DM, neuropathy, chronic b/l LE ulcers presents, severe chronic pain,  hx infected R TKR with MRSA (was eventually cemented so has limited ROM R knee), and history of cholecystostomy tube placement in February 2020 for acute cholecystitis s/p removal in May 2020 with multiple presentations including persistent bilious drainage from the prior tract site as well as a RUQ abdominal abscess at the site s/p drainage admitted for septic arthritis.     # Septic arthritis, hardware removed previously  #MSSA Bacteremia   - s/p steroid injection OP   - Knee aspirated 04/26: cloudy, yellow, CPPD crystals present, neutrophils >50K, RBC >8k  - s/p I&D by ortho 04/26  - Aspirate Cx w/ MSSA, Blood Cx w/ MSSA   - ID following: on Nafcillin started on 4/28   - TTE : Mild thickening of the anterior and posterior mitral valve leaflets   - repeat bx on 4/27: negative, cultures on 4/28 prelim for Gram Positive Cocci in Clusters  - pt is npo for JOHN PAUL today; f/u results and cardiology recommendations; patient covid pcr 4/29 is positive however was negative x 2 on admission; initially positive on 3/2021: ID notified and patient does not require retesting or isolation precaution as patient is not infectious and positive due to residual secretions.   - plan for ortho to remove drain today     # Bilious Drainage from abdominal wound  -  cholecystostomy tube placement in February 2020 for acute cholecystitis s/p removal in May 2020 with multiple presentations including persistent bilious drainage from the prior tract site as well as a RUQ abdominal abscess at the site s/p drainage  - CT A/P w/ decreased RUQ stranding, no abscess   - spoke w/ patient prefers to follow up OP w/ team at [facility] ; patient refusing hida scan  - GI consulted- no interventions, agree w/ OP follow up   - Patient does not want no further workup for bilious drainage; wants to follow up at [facility]  - IR was consulted for above: no further intervention; would need cholecystectomy   - re-consonsulted IR for possible vac dressing at site however suggested to call surgery: surgery team reported vac dressing is not indicated due to current size; continue with daily routine dressings     # Cachexia  - pt endorses losing 70+ lbs in the last year and a half  - BMI 23.5  - consult nutrition   - likely multifactorial in the setting of chronic disease     #CAD s/p 2 stent PCI that thrombosed, s/p CABG  #CHFrEF EF 15% s/p AICD  #HLD  - hx of 2 thrombosed stents/MI now s/p CABG   - TTE EF 25%  this adm   - has AICD  - Cardiology consulted: cont aspirin, prasugrel, digoxin, toprol, rosuvastatin, aldactone  - Plan for JOHN PAUL on friday given repeat BCX MMSA    #Choreiform Movement Disorder - possible SE of medications (Cymbalta)   - Neurology consulted during previous admission:   ddx: sporadic paroxysmal non-kinesogenic choreoathetosis vs celiac-related progressive chorea   - c/w Clonazepam 0.5mg TID   - will cont his meds for now, will monitor    # LE Neuropathy   - continue Duloxetine     # Microcytic anemia likely anemia of chronic dx- iron studies, trend hgb    # DM2- was on insulin pump however was reportedly disconnected  - currently on diabetes hospital protocol; sliding scale  - insulin increased to 25 yesterday but patient brought missing piece of pump back: if pump not functioning endo consult reccs appreciated Lantus 45 units at bedtime , start lispro 6 units TIDAc with ISS 2:50 >150 TIDAC .     DVT ppx: lovenox   FULL CODE

## 2021-04-30 NOTE — PROGRESS NOTE ADULT - SUBJECTIVE AND OBJECTIVE BOX
DIAGNOSIS:   HOSPITAL DAY #:    STATUS POST:    POST OPERATIVE DAY #:     Vital Signs Last 24 Hrs  T(C): 36.6 (30 Apr 2021 21:05), Max: 36.8 (30 Apr 2021 04:56)  T(F): 97.9 (30 Apr 2021 21:05), Max: 98.3 (30 Apr 2021 04:56)  HR: 86 (30 Apr 2021 21:05) (78 - 86)  BP: 120/60 (30 Apr 2021 21:05) (100/51 - 120/60)  BP(mean): --  RR: 18 (30 Apr 2021 21:05) (18 - 18)  SpO2: 98% (30 Apr 2021 21:05) (98% - 98%)    SUBJECTIVE: Pt seen    Pain: YES  [ ]   NO [ ]   Nausea: [ ] YES [ ] NO           Vomiting: [ ] YES [ ] NO  Flatus: [ ] YES [ ] NO             Bowel Movement: [ ] YES [ ] NO     Void: [ ]YES [ ]No      MIS DRAINAGE: SIGNIFICANT [ ]   NOT SIGNIFICANT [ ]   NOT APPLICABLE [ ]  YES [ ] NO    General Appearance: Appears well, NAD  Neck: Supple  Chest: Equal expansion bilaterally, equal breath sounds  CV: Pulse regular presently  Abdomen: Soft x] YES [ ]NO  DISTENDED [ ] YES [x ] NO TENDERNESS [ ]YES [ ]NO  INCISIONS: HEALING WELL [ ] YES  [ ] NO ERYTHEMA [ ] YES [ ] NO DRAINAGE [ ] YES  [ ] NO  Extremities: Grossly symmetric, CALF TENDERNESS [ ] YES  [ ] NO  abdominal wound same needs ERCP discussed case with DR APARICIO    LABS:                        10.0   14.41 )-----------( 363      ( 30 Apr 2021 11:15 )             33.4     04-30    135  |  96<L>  |  23<H>  ----------------------------<  144<H>  3.8   |  28  |  1.1    Ca    9.4      30 Apr 2021 11:15  Mg     2.0     04-30    TPro  7.0  /  Alb  3.5  /  TBili  1.1  /  DBili  x   /  AST  16  /  ALT  20  /  AlkPhos  122<H>  04-30    PT/INR - ( 30 Apr 2021 11:15 )   PT: 14.50 sec;   INR: 1.26 ratio         PTT - ( 30 Apr 2021 11:15 )  PTT:26.1 sec        ASSESSMENT: ID and cardiology follow up noted     GOOD POST OP COURSE [ ]  YES  [ ] NO  CONDITION IMPROVING  []  YES  [ ]  NO          PLAN:    CONTINUE PRESENT MANAGEMENT  [ ] YES  [ ] NO

## 2021-04-30 NOTE — PROGRESS NOTE ADULT - ASSESSMENT
This isASSESSMENT  65 year old male with PMH of CAD s/p MI, PCI/CABG, CHFrEF (15-20%), has ICD, HTN, celiac disease, DM, neuropathy, chronic b/l LE ulcers presents, severe chronic pain,  hx infected R TKR with MRSA (was eventually cemented so has limited ROM R knee), and history of cholecystostomy tube placement in February 2020 for acute cholecystitis s/p removal in May 2020 with multiple presentations including persistent bilious drainage from the prior tract site as well as a RUQ abdominal abscess at the site s/p drainage who presents with left knee pain    IMPRESSION  #Septic Arthritis of left knee  - s/p arthrocentesis of left knee - consistent with baterial septic infection   - s/p OR washout 4/26 -- purulent fluid in left knee with significant tricompartmental arthritis   - Blood Cx 4/26 MSSA  - OR Cx 4/26 MSSA  - Blood Cx 4/27 NG, 4/28 growing Staph     #Biliary Drainage from previous perc sudhir site  - Previous Intraabdominal 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 Decreased area of right upper quadrant subcutaneous stranding at site of prior percutaneous cholecystostomy, without evidence of abscess.    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    #CHF s/p ICD  #DM   #Abx allergy: carvedilol (Other)  enalapril (Hives)  Entresto (Other)    Creatinine, Serum: 1.0 mg/dL (04.25.21 @ 22:15)    RECOMMENDATIONS  - nafcillin 2g q 4 hours  - please repeat blood cultures today and daily for next two days   - JOHN PAUL is planned for today to evaluate leads  - pain control per primary    This is a preliminary incomplete pended note, all final recommendations to follow after interview and examination of the patient.    Please call or message on [employer] Teams if with any questions.  Spectra [phone]   This isASSESSMENT  65 year old male with PMH of CAD s/p MI, PCI/CABG, CHFrEF (15-20%), has ICD, HTN, celiac disease, DM, neuropathy, chronic b/l LE ulcers presents, severe chronic pain,  hx infected R TKR with MRSA (was eventually cemented so has limited ROM R knee), and history of cholecystostomy tube placement in February 2020 for acute cholecystitis s/p removal in May 2020 with multiple presentations including persistent bilious drainage from the prior tract site as well as a RUQ abdominal abscess at the site s/p drainage who presents with left knee pain    IMPRESSION  #Septic Arthritis of left knee  - s/p arthrocentesis of left knee - consistent with baterial septic infection   - s/p OR washout 4/26 -- purulent fluid in left knee with significant tricompartmental arthritis   - Blood Cx 4/26 MSSA  - OR Cx 4/26 MSSA  - Blood Cx 4/27 NG, 4/28 growing Staph     #Biliary Drainage from previous perc sudhir site  - Previous Intraabdominal 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 Decreased area of right upper quadrant subcutaneous stranding at site of prior percutaneous cholecystostomy, without evidence of abscess.    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    #CHF s/p ICD  #DM   #Abx allergy: carvedilol (Other)  enalapril (Hives)  Entresto (Other)    Creatinine, Serum: 1.0 mg/dL (04.25.21 @ 22:15)    RECOMMENDATIONS  - found to be COVID positive on pre-op testing for JOHN PAUL -- likely dead virus and not infectious; no need for isolation and no issues with undergoing JOHN PAUL  - nafcillin 2g q 4 hours  - please repeat blood cultures today and daily for next two days   - JOHN PAUL is planned for today to evaluate leads  - one drain removed from knee -- if remains persistently bacteremic over the weekend, may need discussion with ortho if further washout  - pain control per primary    Please call or message on [employer] Teams if with any questions.  Spectra [phone]

## 2021-04-30 NOTE — PROGRESS NOTE ADULT - ASSESSMENT
- patient has pump and CGM , FS better controlled, he would like to manage pump himself. current basal rate 2 units/hr   Endocrinology will sign off please call for any new questions

## 2021-04-30 NOTE — PROGRESS NOTE ADULT - SUBJECTIVE AND OBJECTIVE BOX
----------Daily Progress Note----------    HISTORY OF PRESENT ILLNESS:  Patient is a 65y old Male who presents with a chief complaint of Septic arthritis (30 Apr 2021 07:01)    Currently admitted to medicine with the primary diagnosis of Knee pain, left       Today is hospital day 4d.     INTERVAL HOSPITAL COURSE / OVERNIGHT EVENTS:    Patient was examined and seen at bedside. This morning he is resting comfortably in bed and reports no new issues or overnight events. Pt is npo for JOHN PAUL Today.     Review of Systems: Otherwise unremarkable     <<<<<PAST MEDICAL & SURGICAL HISTORY>>>>>  Status post percutaneous transluminal coronary angioplasty  2 stents    Atherosclerosis of coronary artery  CAD (coronary artery disease)    Osteoarthritis    HLD (hyperlipidemia)    Blood clot due to device, implant, or graft  was on blood thinners    Diabetes  on insulin pump    HTN (hypertension)    CHF (congestive heart failure)  EF ~ 25%    History of celiac disease    Diverticulitis    STEMI (ST elevation myocardial infarction)    Diabetic neuropathy    Anxiety and depression    Other postprocedural status  Fixation hardware in foot LEFT    Stented coronary artery  10/18 heart attack  INFERIOR WALL MI    S/P CABG x 1  2018    S/P TKR (total knee replacement), right  with infection Mrsa   per pt he was cleared from MRSA infection    Surgery, elective  right knee wound debridement    History of open reduction and internal fixation (ORIF) procedure    H/O shoulder surgery  right    AICD (automatic cardioverter/defibrillator) present    Cholecystostomy care  drain inserted 2020 &amp; removed 4 months ago    History of tonsillectomy    History of hip replacement, total, right      ALLERGIES  ACE inhibitors (Hives)  carvedilol (Other)  enalapril (Hives)  Entresto (Other)      Home Medications:  ALPRAZolam 0.5 mg oral tablet: 1 tab(s) orally every 8 hours (26 Apr 2021 12:11)  aspirin 81 mg oral delayed release tablet: 1 tab(s) orally once a day (26 Apr 2021 12:11)  digoxin 125 mcg (0.125 mg) oral tablet: 1 tab(s) orally once a day (26 Apr 2021 12:11)  DULoxetine: 90 milligram(s) orally once a day (26 Apr 2021 12:11)  insulin: pump; HUMULIN (26 Apr 2021 12:11)  Jardiance 10 mg oral tablet: 1 tab(s) orally once a day (in the morning) (26 Apr 2021 12:11)  melatonin 10 mg oral tablet: 1 tab(s) orally once a day (at bedtime), As needed, Insomnia (26 Apr 2021 12:11)  metoprolol succinate 25 mg oral tablet, extended release: 1 tab(s) orally 2 times a day (26 Apr 2021 12:11)  morphine 15 mg oral tablet: 1 tab(s) orally every 4 hours, As Needed - for severe pain (7 to 10 out of 10) (26 Apr 2021 12:11)  prasugrel 10 mg oral tablet: 1 tab(s) orally once a day (26 Apr 2021 12:11)  spironolactone 25 mg oral tablet: 1 tab(s) orally once a day (26 Apr 2021 12:11)        MEDICATIONS  STANDING MEDICATIONS  ascorbic acid 500 milliGRAM(s) Oral daily  aspirin enteric coated 81 milliGRAM(s) Oral daily  atorvastatin 80 milliGRAM(s) Oral at bedtime  chlorhexidine 4% Liquid 1 Application(s) Topical <User Schedule>  dextrose 40% Gel 15 Gram(s) Oral once  dextrose 5%. 1000 milliLiter(s) IV Continuous <Continuous>  dextrose 5%. 1000 milliLiter(s) IV Continuous <Continuous>  dextrose 50% Injectable 25 Gram(s) IV Push once  dextrose 50% Injectable 12.5 Gram(s) IV Push once  dextrose 50% Injectable 25 Gram(s) IV Push once  digoxin     Tablet 125 MICROGram(s) Oral daily  DULoxetine 90 milliGRAM(s) Oral daily  enoxaparin Injectable 40 milliGRAM(s) SubCutaneous daily  glucagon  Injectable 1 milliGRAM(s) IntraMuscular once  insulin glargine Injectable (LANTUS) 10 Unit(s) SubCutaneous at bedtime  insulin lispro (ADMELOG) corrective regimen sliding scale   SubCutaneous three times a day before meals  insulin lispro Injectable (ADMELOG) 6 Unit(s) SubCutaneous three times a day before meals  metoprolol succinate ER 25 milliGRAM(s) Oral daily  multivitamin/minerals 1 Tablet(s) Oral daily  nafcillin  IVPB 2 Gram(s) IV Intermittent every 4 hours  nafcillin  IVPB      pantoprazole    Tablet 40 milliGRAM(s) Oral before breakfast  polyethylene glycol 3350 17 Gram(s) Oral two times a day  prasugrel 10 milliGRAM(s) Oral daily  spironolactone 25 milliGRAM(s) Oral daily    PRN MEDICATIONS  acetaminophen   Tablet .. 650 milliGRAM(s) Oral every 6 hours PRN  ALPRAZolam 0.5 milliGRAM(s) Oral every 8 hours PRN  furosemide    Tablet 40 milliGRAM(s) Oral daily PRN  melatonin 10 milliGRAM(s) Oral at bedtime PRN  morphine  - Injectable 4 milliGRAM(s) IV Push every 4 hours PRN  oxycodone    5 mG/acetaminophen 325 mG 1 Tablet(s) Oral every 6 hours PRN    VITALS:  T(F): 98.3  HR: 80  BP: 108/54  RR: 18  SpO2: --    <<<<<LABS>>>>>                        9.1    8.68  )-----------( 282      ( 29 Apr 2021 06:53 )             30.0     04-29    133<L>  |  97<L>  |  22<H>  ----------------------------<  311<H>  3.9   |  27  |  1.0    Ca    8.7      29 Apr 2021 06:53  Mg     1.8     04-29    TPro  6.7  /  Alb  3.4<L>  /  TBili  0.8  /  DBili  x   /  AST  21  /  ALT  31  /  AlkPhos  123<H>  04-28              Culture - Blood (collected 28 Apr 2021 08:13)  Source: .Blood Blood  Gram Stain (29 Apr 2021 15:21):    Growth in anaerobic bottle: Gram Positive Cocci in Clusters  Preliminary Report (29 Apr 2021 15:22):    Growth in anaerobic bottle: Gram Positive Cocci in Clusters    Culture - Blood (collected 28 Apr 2021 08:13)  Source: .Blood Blood  Gram Stain (30 Apr 2021 02:25):    Growth in anaerobic bottle: Gram Positive Cocci in Clusters  Preliminary Report (30 Apr 2021 02:25):    Growth in anaerobic bottle: Gram Positive Cocci in Clusters    [id number]        <<<<<RADIOLOGY>>>>>          -----------------------------------------------------------------------------------------------------------------------------------------------------------------------------------------------

## 2021-04-30 NOTE — PROGRESS NOTE ADULT - SUBJECTIVE AND OBJECTIVE BOX
Orthopaedics Progress Note    FLOWER MARISCAL  [id number]    Patient is a 65y year old Male with left septic knee  POD#4 from left knee I&D  One drain was removed yesterday, one drain remains  Patient seen and examined bedside  Pain well controlled  No other complaints    acetaminophen   Tablet .. 650 milliGRAM(s) Oral every 6 hours PRN  ALPRAZolam 0.5 milliGRAM(s) Oral every 8 hours PRN  ascorbic acid 500 milliGRAM(s) Oral daily  aspirin enteric coated 81 milliGRAM(s) Oral daily  atorvastatin 80 milliGRAM(s) Oral at bedtime  chlorhexidine 4% Liquid 1 Application(s) Topical <User Schedule>  dextrose 40% Gel 15 Gram(s) Oral once  dextrose 5%. 1000 milliLiter(s) IV Continuous <Continuous>  dextrose 5%. 1000 milliLiter(s) IV Continuous <Continuous>  dextrose 50% Injectable 25 Gram(s) IV Push once  dextrose 50% Injectable 12.5 Gram(s) IV Push once  dextrose 50% Injectable 25 Gram(s) IV Push once  digoxin     Tablet 125 MICROGram(s) Oral daily  DULoxetine 90 milliGRAM(s) Oral daily  enoxaparin Injectable 40 milliGRAM(s) SubCutaneous daily  furosemide    Tablet 40 milliGRAM(s) Oral daily PRN  glucagon  Injectable 1 milliGRAM(s) IntraMuscular once  insulin glargine Injectable (LANTUS) 10 Unit(s) SubCutaneous at bedtime  insulin lispro (ADMELOG) corrective regimen sliding scale   SubCutaneous three times a day before meals  insulin lispro Injectable (ADMELOG) 6 Unit(s) SubCutaneous three times a day before meals  melatonin 10 milliGRAM(s) Oral at bedtime PRN  metoprolol succinate ER 25 milliGRAM(s) Oral daily  morphine  - Injectable 4 milliGRAM(s) IV Push every 4 hours PRN  multivitamin/minerals 1 Tablet(s) Oral daily  nafcillin  IVPB 2 Gram(s) IV Intermittent every 4 hours  nafcillin  IVPB      oxycodone    5 mG/acetaminophen 325 mG 1 Tablet(s) Oral every 6 hours PRN  pantoprazole    Tablet 40 milliGRAM(s) Oral before breakfast  polyethylene glycol 3350 17 Gram(s) Oral two times a day  prasugrel 10 milliGRAM(s) Oral daily  spironolactone 25 milliGRAM(s) Oral daily      T(C): 36.8 (04-30-21 @ 04:56), Max: 36.8 (04-30-21 @ 04:56)  HR: 80 (04-30-21 @ 04:56) (80 - 85)  BP: 108/54 (04-30-21 @ 04:56) (108/54 - 134/64)  RR: 18 (04-30-21 @ 04:56) (18 - 18)  SpO2: --    Physical Exam  NAD  Breathing comfortably on RA  Resting comfortably  LLE  Knee immobilizer and drain in place  ACE dressing c/d/i  Motor: TA/EHL/Gastroc intact  Sensory: SP/DP/Dueñas/Sa intact  Vasc: foot WWP, 2+ DP pulse    Labs                        9.1    8.68  )-----------( 282      ( 29 Apr 2021 06:53 )             30.0     04-29    133<L>  |  97<L>  |  22<H>  ----------------------------<  311<H>  3.9   |  27  |  1.0    Ca    8.7      29 Apr 2021 06:53  Mg     1.8     04-29    TPro  6.7  /  Alb  3.4<L>  /  TBili  0.8  /  DBili  x   /  AST  21  /  ALT  31  /  AlkPhos  123<H>  04-28    LIVER FUNCTIONS - ( 28 Apr 2021 11:23 )  Alb: 3.4 g/dL / Pro: 6.7 g/dL / ALK PHOS: 123 U/L / ALT: 31 U/L / AST: 21 U/L / GGT: x             A/P: Patient is a 65y year old Male as above    WBAT on LLE  Further plan for drain removal pending  Surgery following for bilious drainage  DVT ppx: SCD, LVX  Abx: nafcillin  Pain control  Home medications: resume  Trend labs  Dispo: Pending PT recommendations

## 2021-04-30 NOTE — PROGRESS NOTE ADULT - SUBJECTIVE AND OBJECTIVE BOX
S: patient seen , has pump and CGM , eating ok   CAPILLARY BLOOD GLUCOSE      POCT Blood Glucose.: 154 mg/dL (30 Apr 2021 11:39)  POCT Blood Glucose.: 186 mg/dL (30 Apr 2021 07:28)  POCT Blood Glucose.: 154 mg/dL (29 Apr 2021 21:59)  POCT Blood Glucose.: 290 mg/dL (29 Apr 2021 16:37)  POCT Blood Glucose.: 316 mg/dL (29 Apr 2021 14:35)      MEDICATIONS  (STANDING):  ascorbic acid 500 milliGRAM(s) Oral daily  aspirin enteric coated 81 milliGRAM(s) Oral daily  atorvastatin 80 milliGRAM(s) Oral at bedtime  chlorhexidine 4% Liquid 1 Application(s) Topical <User Schedule>  dextrose 40% Gel 15 Gram(s) Oral once  dextrose 5%. 1000 milliLiter(s) (50 mL/Hr) IV Continuous <Continuous>  dextrose 5%. 1000 milliLiter(s) (100 mL/Hr) IV Continuous <Continuous>  dextrose 50% Injectable 25 Gram(s) IV Push once  dextrose 50% Injectable 12.5 Gram(s) IV Push once  dextrose 50% Injectable 25 Gram(s) IV Push once  digoxin     Tablet 125 MICROGram(s) Oral daily  DULoxetine 90 milliGRAM(s) Oral daily  glucagon  Injectable 1 milliGRAM(s) IntraMuscular once  metoprolol succinate ER 25 milliGRAM(s) Oral daily  multivitamin/minerals 1 Tablet(s) Oral daily  nafcillin  IVPB 2 Gram(s) IV Intermittent every 4 hours  nafcillin  IVPB      pantoprazole    Tablet 40 milliGRAM(s) Oral before breakfast  polyethylene glycol 3350 17 Gram(s) Oral two times a day  prasugrel 10 milliGRAM(s) Oral daily  spironolactone 25 milliGRAM(s) Oral daily    MEDICATIONS  (PRN):  acetaminophen   Tablet .. 650 milliGRAM(s) Oral every 6 hours PRN Temp greater or equal to 38C (100.4F), Mild Pain (1 - 3)  ALPRAZolam 0.5 milliGRAM(s) Oral every 8 hours PRN anxiety  furosemide    Tablet 40 milliGRAM(s) Oral daily PRN fluid overload  melatonin 10 milliGRAM(s) Oral at bedtime PRN Insomnia  morphine  - Injectable 4 milliGRAM(s) IV Push every 4 hours PRN Severe Pain (7 - 10)  oxycodone    5 mG/acetaminophen 325 mG 1 Tablet(s) Oral every 6 hours PRN Moderate Pain (4 - 6)

## 2021-05-01 LAB
ANION GAP SERPL CALC-SCNC: 11 MMOL/L — SIGNIFICANT CHANGE UP (ref 7–14)
BASOPHILS # BLD AUTO: 0.06 K/UL — SIGNIFICANT CHANGE UP (ref 0–0.2)
BASOPHILS NFR BLD AUTO: 0.5 % — SIGNIFICANT CHANGE UP (ref 0–1)
BUN SERPL-MCNC: 25 MG/DL — HIGH (ref 10–20)
CALCIUM SERPL-MCNC: 9.1 MG/DL — SIGNIFICANT CHANGE UP (ref 8.5–10.1)
CHLORIDE SERPL-SCNC: 98 MMOL/L — SIGNIFICANT CHANGE UP (ref 98–110)
CO2 SERPL-SCNC: 27 MMOL/L — SIGNIFICANT CHANGE UP (ref 17–32)
CREAT SERPL-MCNC: 1.1 MG/DL — SIGNIFICANT CHANGE UP (ref 0.7–1.5)
EOSINOPHIL # BLD AUTO: 0.39 K/UL — SIGNIFICANT CHANGE UP (ref 0–0.7)
EOSINOPHIL NFR BLD AUTO: 3.2 % — SIGNIFICANT CHANGE UP (ref 0–8)
GLUCOSE SERPL-MCNC: 56 MG/DL — LOW (ref 70–99)
HCT VFR BLD CALC: 29.8 % — LOW (ref 42–52)
HGB BLD-MCNC: 9.1 G/DL — LOW (ref 14–18)
IMM GRANULOCYTES NFR BLD AUTO: 0.3 % — SIGNIFICANT CHANGE UP (ref 0.1–0.3)
LYMPHOCYTES # BLD AUTO: 1.2 K/UL — SIGNIFICANT CHANGE UP (ref 1.2–3.4)
LYMPHOCYTES # BLD AUTO: 9.7 % — LOW (ref 20.5–51.1)
MAGNESIUM SERPL-MCNC: 2 MG/DL — SIGNIFICANT CHANGE UP (ref 1.8–2.4)
MCHC RBC-ENTMCNC: 21.4 PG — LOW (ref 27–31)
MCHC RBC-ENTMCNC: 30.5 G/DL — LOW (ref 32–37)
MCV RBC AUTO: 70 FL — LOW (ref 80–94)
MONOCYTES # BLD AUTO: 1.13 K/UL — HIGH (ref 0.1–0.6)
MONOCYTES NFR BLD AUTO: 9.2 % — SIGNIFICANT CHANGE UP (ref 1.7–9.3)
NEUTROPHILS # BLD AUTO: 9.52 K/UL — HIGH (ref 1.4–6.5)
NEUTROPHILS NFR BLD AUTO: 77.1 % — HIGH (ref 42.2–75.2)
NRBC # BLD: 0 /100 WBCS — SIGNIFICANT CHANGE UP (ref 0–0)
PHOSPHATE SERPL-MCNC: 4.2 MG/DL — SIGNIFICANT CHANGE UP (ref 2.1–4.9)
PLATELET # BLD AUTO: 314 K/UL — SIGNIFICANT CHANGE UP (ref 130–400)
POTASSIUM SERPL-MCNC: 4 MMOL/L — SIGNIFICANT CHANGE UP (ref 3.5–5)
POTASSIUM SERPL-SCNC: 4 MMOL/L — SIGNIFICANT CHANGE UP (ref 3.5–5)
RBC # BLD: 4.26 M/UL — LOW (ref 4.7–6.1)
RBC # FLD: 18.8 % — HIGH (ref 11.5–14.5)
SODIUM SERPL-SCNC: 136 MMOL/L — SIGNIFICANT CHANGE UP (ref 135–146)
WBC # BLD: 12.34 K/UL — HIGH (ref 4.8–10.8)
WBC # FLD AUTO: 12.34 K/UL — HIGH (ref 4.8–10.8)

## 2021-05-01 PROCEDURE — 99233 SBSQ HOSP IP/OBS HIGH 50: CPT

## 2021-05-01 RX ADMIN — ATORVASTATIN CALCIUM 80 MILLIGRAM(S): 80 TABLET, FILM COATED ORAL at 21:29

## 2021-05-01 RX ADMIN — PRASUGREL 10 MILLIGRAM(S): 5 TABLET, FILM COATED ORAL at 11:12

## 2021-05-01 RX ADMIN — MORPHINE SULFATE 4 MILLIGRAM(S): 50 CAPSULE, EXTENDED RELEASE ORAL at 04:40

## 2021-05-01 RX ADMIN — NAFCILLIN 200 GRAM(S): 10 INJECTION, POWDER, FOR SOLUTION INTRAVENOUS at 17:07

## 2021-05-01 RX ADMIN — MORPHINE SULFATE 4 MILLIGRAM(S): 50 CAPSULE, EXTENDED RELEASE ORAL at 09:45

## 2021-05-01 RX ADMIN — NAFCILLIN 200 GRAM(S): 10 INJECTION, POWDER, FOR SOLUTION INTRAVENOUS at 11:10

## 2021-05-01 RX ADMIN — NAFCILLIN 200 GRAM(S): 10 INJECTION, POWDER, FOR SOLUTION INTRAVENOUS at 01:57

## 2021-05-01 RX ADMIN — Medication 0.5 MILLIGRAM(S): at 04:03

## 2021-05-01 RX ADMIN — DULOXETINE HYDROCHLORIDE 90 MILLIGRAM(S): 30 CAPSULE, DELAYED RELEASE ORAL at 11:11

## 2021-05-01 RX ADMIN — NAFCILLIN 200 GRAM(S): 10 INJECTION, POWDER, FOR SOLUTION INTRAVENOUS at 14:15

## 2021-05-01 RX ADMIN — SPIRONOLACTONE 25 MILLIGRAM(S): 25 TABLET, FILM COATED ORAL at 05:13

## 2021-05-01 RX ADMIN — OXYCODONE AND ACETAMINOPHEN 1 TABLET(S): 5; 325 TABLET ORAL at 13:30

## 2021-05-01 RX ADMIN — Medication 1 TABLET(S): at 11:11

## 2021-05-01 RX ADMIN — MORPHINE SULFATE 4 MILLIGRAM(S): 50 CAPSULE, EXTENDED RELEASE ORAL at 04:06

## 2021-05-01 RX ADMIN — MORPHINE SULFATE 4 MILLIGRAM(S): 50 CAPSULE, EXTENDED RELEASE ORAL at 08:56

## 2021-05-01 RX ADMIN — MORPHINE SULFATE 4 MILLIGRAM(S): 50 CAPSULE, EXTENDED RELEASE ORAL at 14:16

## 2021-05-01 RX ADMIN — Medication 25 MILLIGRAM(S): at 05:13

## 2021-05-01 RX ADMIN — MORPHINE SULFATE 4 MILLIGRAM(S): 50 CAPSULE, EXTENDED RELEASE ORAL at 21:24

## 2021-05-01 RX ADMIN — POLYETHYLENE GLYCOL 3350 17 GRAM(S): 17 POWDER, FOR SOLUTION ORAL at 05:12

## 2021-05-01 RX ADMIN — Medication 10 MILLIGRAM(S): at 21:24

## 2021-05-01 RX ADMIN — NAFCILLIN 200 GRAM(S): 10 INJECTION, POWDER, FOR SOLUTION INTRAVENOUS at 05:12

## 2021-05-01 RX ADMIN — CHLORHEXIDINE GLUCONATE 1 APPLICATION(S): 213 SOLUTION TOPICAL at 05:14

## 2021-05-01 RX ADMIN — NAFCILLIN 200 GRAM(S): 10 INJECTION, POWDER, FOR SOLUTION INTRAVENOUS at 21:29

## 2021-05-01 RX ADMIN — OXYCODONE AND ACETAMINOPHEN 1 TABLET(S): 5; 325 TABLET ORAL at 12:45

## 2021-05-01 RX ADMIN — Medication 125 MICROGRAM(S): at 05:13

## 2021-05-01 RX ADMIN — PANTOPRAZOLE SODIUM 40 MILLIGRAM(S): 20 TABLET, DELAYED RELEASE ORAL at 05:14

## 2021-05-01 RX ADMIN — MORPHINE SULFATE 4 MILLIGRAM(S): 50 CAPSULE, EXTENDED RELEASE ORAL at 14:30

## 2021-05-01 RX ADMIN — Medication 81 MILLIGRAM(S): at 11:11

## 2021-05-01 RX ADMIN — POLYETHYLENE GLYCOL 3350 17 GRAM(S): 17 POWDER, FOR SOLUTION ORAL at 17:05

## 2021-05-01 RX ADMIN — Medication 500 MILLIGRAM(S): at 11:11

## 2021-05-01 NOTE — PROGRESS NOTE ADULT - SUBJECTIVE AND OBJECTIVE BOX
FLOWER MARISCAL    Patient is a 65y old  Male who presents with a chief complaint of Septic arthritis (01 May 2021 07:16)    INTERVAL HPI/OVERNIGHT EVENTS: pt c/o worsening of left knee pain.    ROS: All ROS negative except as documented above     PHYSICAL EXAM:  T(C): 36.7, Max: 36.7 (05-01-21 @ 13:22)  HR: 69 (69 - 86)  BP: 101/54 (101/54 - 120/60)  RR: 18 (18 - 18)  SpO2: 98% (98% - 98%)    GENERAL: pt is in distress due to pain in left knee  PULMONARY/CHEST: No rales, rhonchi, wheezing  CARDIOVASC: Regular rate and rhythm; No murmurs  GI/ABDOMEN: +drain form biliary fistula, abd soft, nontender, non distended; Bowel sounds present  EXTREMITIES: Left knee with drain in, very tender to palpation, no effusion noted, ROM decreased, Right knee joint enlarged, not tender to palpation  NERVOUS SYSTEM:  Alert & Oriented X3, no focal deficit     LABS:                        9.1    12.34 )-----------( 314      ( 01 May 2021 06:49 )             29.8     05-01    136  |  98  |  25<H>  ----------------------------<  56<L>  4.0   |  27  |  1.1    Ca    9.1      01 May 2021 06:49  Phos  4.2     05-01  Mg     2.0     05-01    TPro  7.0  /  Alb  3.5  /  TBili  1.1  /  DBili  x   /  AST  16  /  ALT  20  /  AlkPhos  122<H>  04-30    PT/INR - ( 30 Apr 2021 11:15 )   PT: 14.50 sec;   INR: 1.26 ratio    PTT - ( 30 Apr 2021 11:15 )  PTT:26.1 sec    CAPILLARY BLOOD GLUCOSE  POCT Blood Glucose.: 281 mg/dL (30 Apr 2021 21:21)      RADIOLOGY & ADDITIONAL TESTS:  no new tests      MEDICATIONS  (STANDING):  ascorbic acid 500 milliGRAM(s) Oral daily  aspirin enteric coated 81 milliGRAM(s) Oral daily  atorvastatin 80 milliGRAM(s) Oral at bedtime  chlorhexidine 4% Liquid 1 Application(s) Topical <User Schedule>  dextrose 40% Gel 15 Gram(s) Oral once  dextrose 5%. 1000 milliLiter(s) (50 mL/Hr) IV Continuous <Continuous>  dextrose 5%. 1000 milliLiter(s) (100 mL/Hr) IV Continuous <Continuous>  dextrose 50% Injectable 12.5 Gram(s) IV Push once  dextrose 50% Injectable 25 Gram(s) IV Push once  dextrose 50% Injectable 25 Gram(s) IV Push once  digoxin     Tablet 125 MICROGram(s) Oral daily  DULoxetine 90 milliGRAM(s) Oral daily  glucagon  Injectable 1 milliGRAM(s) IntraMuscular once  metoprolol succinate ER 25 milliGRAM(s) Oral daily  multivitamin/minerals 1 Tablet(s) Oral daily  nafcillin  IVPB 2 Gram(s) IV Intermittent every 4 hours  nafcillin  IVPB      pantoprazole    Tablet 40 milliGRAM(s) Oral before breakfast  polyethylene glycol 3350 17 Gram(s) Oral two times a day  prasugrel 10 milliGRAM(s) Oral daily  spironolactone 25 milliGRAM(s) Oral daily    MEDICATIONS  (PRN):  acetaminophen   Tablet .. 650 milliGRAM(s) Oral every 6 hours PRN Temp greater or equal to 38C (100.4F), Mild Pain (1 - 3)  ALPRAZolam 0.5 milliGRAM(s) Oral every 8 hours PRN anxiety  furosemide    Tablet 40 milliGRAM(s) Oral daily PRN fluid overload  melatonin 10 milliGRAM(s) Oral at bedtime PRN Insomnia  morphine  - Injectable 4 milliGRAM(s) IV Push every 4 hours PRN Severe Pain (7 - 10)  oxycodone    5 mG/acetaminophen 325 mG 1 Tablet(s) Oral every 6 hours PRN Moderate Pain (4 - 6)       FLOWER MARISCAL    Patient is a 65y old  Male who presents with a chief complaint of Septic arthritis (01 May 2021 07:16)    INTERVAL HPI/OVERNIGHT EVENTS: pt c/o worsening of left knee pain.    ROS: All ROS negative except as documented above     PHYSICAL EXAM:  T(C): 36.7, Max: 36.7 (05-01-21 @ 13:22)  HR: 69 (69 - 86)  BP: 101/54 (101/54 - 120/60)  RR: 18 (18 - 18)  SpO2: 98% (98% - 98%)    GENERAL: pt is in distress due to pain in left knee  PULMONARY/CHEST: No rales, rhonchi, wheezing  CARDIOVASC: Regular rate and rhythm; No murmurs  GI/ABDOMEN: +drain form biliary fistula, abd soft, nontender, non distended; Bowel sounds present  EXTREMITIES: Left knee with drain in, very tender to palpation, no effusion noted, ROM decreased, Right knee joint enlarged, not tender to palpation  NERVOUS SYSTEM:  Alert & Oriented X3, no focal deficit     LABS:                        9.1    12.34 )-----------( 314      ( 01 May 2021 06:49 )             29.8     05-01    136  |  98  |  25<H>  ----------------------------<  56<L>  4.0   |  27  |  1.1    Ca    9.1      01 May 2021 06:49  Phos  4.2     05-01  Mg     2.0     05-01    TPro  7.0  /  Alb  3.5  /  TBili  1.1  /  DBili  x   /  AST  16  /  ALT  20  /  AlkPhos  122<H>  04-30    PT/INR - ( 30 Apr 2021 11:15 )   PT: 14.50 sec;   INR: 1.26 ratio    PTT - ( 30 Apr 2021 11:15 )  PTT:26.1 sec    Culture - Blood in AM (04.28.21 @ 08:13)   Gram Stain:   Growth in anaerobic bottle: Gram Positive Cocci in Clusters   Specimen Source: .Blood Blood   Culture Results:   Growth in anaerobic bottle: Staphylococcus aureus   CAPILLARY BLOOD GLUCOSE  POCT Blood Glucose.: 281 mg/dL (30 Apr 2021 21:21)      RADIOLOGY & ADDITIONAL TESTS:  no new tests      MEDICATIONS  (STANDING):  ascorbic acid 500 milliGRAM(s) Oral daily  aspirin enteric coated 81 milliGRAM(s) Oral daily  atorvastatin 80 milliGRAM(s) Oral at bedtime  chlorhexidine 4% Liquid 1 Application(s) Topical <User Schedule>  dextrose 40% Gel 15 Gram(s) Oral once  dextrose 5%. 1000 milliLiter(s) (50 mL/Hr) IV Continuous <Continuous>  dextrose 5%. 1000 milliLiter(s) (100 mL/Hr) IV Continuous <Continuous>  dextrose 50% Injectable 12.5 Gram(s) IV Push once  dextrose 50% Injectable 25 Gram(s) IV Push once  dextrose 50% Injectable 25 Gram(s) IV Push once  digoxin     Tablet 125 MICROGram(s) Oral daily  DULoxetine 90 milliGRAM(s) Oral daily  glucagon  Injectable 1 milliGRAM(s) IntraMuscular once  metoprolol succinate ER 25 milliGRAM(s) Oral daily  multivitamin/minerals 1 Tablet(s) Oral daily  nafcillin  IVPB 2 Gram(s) IV Intermittent every 4 hours  nafcillin  IVPB      pantoprazole    Tablet 40 milliGRAM(s) Oral before breakfast  polyethylene glycol 3350 17 Gram(s) Oral two times a day  prasugrel 10 milliGRAM(s) Oral daily  spironolactone 25 milliGRAM(s) Oral daily    MEDICATIONS  (PRN):  acetaminophen   Tablet .. 650 milliGRAM(s) Oral every 6 hours PRN Temp greater or equal to 38C (100.4F), Mild Pain (1 - 3)  ALPRAZolam 0.5 milliGRAM(s) Oral every 8 hours PRN anxiety  furosemide    Tablet 40 milliGRAM(s) Oral daily PRN fluid overload  melatonin 10 milliGRAM(s) Oral at bedtime PRN Insomnia  morphine  - Injectable 4 milliGRAM(s) IV Push every 4 hours PRN Severe Pain (7 - 10)  oxycodone    5 mG/acetaminophen 325 mG 1 Tablet(s) Oral every 6 hours PRN Moderate Pain (4 - 6)

## 2021-05-01 NOTE — PROGRESS NOTE ADULT - ATTENDING COMMENTS
Pt. seen/ examined  Labs/ vitals reviewed  Increase in pain, WBCs noted  Discussed possible re-I&D  Will follow

## 2021-05-01 NOTE — PROGRESS NOTE ADULT - ASSESSMENT
65 year old male with PMH of CAD s/p MI, PCI/CABG, CHFrEF (15-20%), has ICD, HTN, celiac disease, DM, neuropathy, chronic b/l LE ulcers, severe chronic pain,  hx infected R TKR with MRSA (was eventually cemented so has limited ROM R knee), and history of cholecystostomy tube placement in February 2020 for acute cholecystitis s/p removal in May 2020, with fistula formation and persistent bilious drainage from the prior tract site as well as a RUQ abdominal abscess at the site s/p drainage admitted for septic arthritis.     # Septic arthritis of Left knee - not better, pain worse  -  s/p arthrocentesis of left knee - consistent with bacterial septic infection   - s/p OR washout 4/26 - purulent fluid in left knee with significant tricompartmental arthritis   - Blood Cx persitently positive form 4/26 to 4/28 - MSSA,   - OR Cx 4/26 Staph aureus  - cont nafcillin as per ID, pt will need long course of ABx.   - cont daily BCx  - ortho on board, I spoke to ortho today again, they will discuss repeat I&D    # Persistent bacteremia, MSSA, likely from septic arthritis, r/o IE  - nava canceled due to positive COVID test  - will repeat COVID tests on Sunday, pt need NAVA done in light of AICD, if positive for vegetations AICD should be extracted     # biliary drainage from perc sudhir site  - Previous Intraabdominal 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 Decreased area of right upper quadrant subcutaneous stranding at site of prior percutaneous cholecystostomy, without evidence of abscess.  - pt needs ERCP and cholecystectomy, GI recommended to do it OP  - IR consult for possible vac dressing, they deferred it to surgery, surgery do not recommended vac dressing, just simple dry dressing.  - HIDA scan was ordered but pt refused, will reorder again  - will send Cx from drain    # Positive COVID PCR - pt has high AB titer, had COVID and had vaccine, no clinical signs of infection, no need for isolation    # persistent pain due to septic arthritis - cont current regimen of opioids, laxatives to avoid constipation    # Chronic iron deficiency anemia with component of ACD - stable Hb      dvt ppx     #Progress Note Handoff  Pending BCx clearance, possible re-I&D of left knee. Still acute.

## 2021-05-01 NOTE — PROGRESS NOTE ADULT - SUBJECTIVE AND OBJECTIVE BOX
Orthopaedics Progress Note    FLOWER MARISCAL  [id number]    Patient is a 65y year old Male with left septic knee  POD#5 from left knee I&D  One drain was removed yesterday, one drain remains  Patient seen and examined bedside  Pain well controlled  No other complaints    MEDICATIONS  (STANDING):  ascorbic acid 500 milliGRAM(s) Oral daily  aspirin enteric coated 81 milliGRAM(s) Oral daily  atorvastatin 80 milliGRAM(s) Oral at bedtime  chlorhexidine 4% Liquid 1 Application(s) Topical <User Schedule>  dextrose 40% Gel 15 Gram(s) Oral once  dextrose 5%. 1000 milliLiter(s) (50 mL/Hr) IV Continuous <Continuous>  dextrose 5%. 1000 milliLiter(s) (100 mL/Hr) IV Continuous <Continuous>  dextrose 50% Injectable 25 Gram(s) IV Push once  dextrose 50% Injectable 12.5 Gram(s) IV Push once  dextrose 50% Injectable 25 Gram(s) IV Push once  digoxin     Tablet 125 MICROGram(s) Oral daily  DULoxetine 90 milliGRAM(s) Oral daily  glucagon  Injectable 1 milliGRAM(s) IntraMuscular once  metoprolol succinate ER 25 milliGRAM(s) Oral daily  multivitamin/minerals 1 Tablet(s) Oral daily  nafcillin  IVPB 2 Gram(s) IV Intermittent every 4 hours  nafcillin  IVPB      pantoprazole    Tablet 40 milliGRAM(s) Oral before breakfast  polyethylene glycol 3350 17 Gram(s) Oral two times a day  prasugrel 10 milliGRAM(s) Oral daily  spironolactone 25 milliGRAM(s) Oral daily    MEDICATIONS  (PRN):  acetaminophen   Tablet .. 650 milliGRAM(s) Oral every 6 hours PRN Temp greater or equal to 38C (100.4F), Mild Pain (1 - 3)  ALPRAZolam 0.5 milliGRAM(s) Oral every 8 hours PRN anxiety  furosemide    Tablet 40 milliGRAM(s) Oral daily PRN fluid overload  melatonin 10 milliGRAM(s) Oral at bedtime PRN Insomnia  morphine  - Injectable 4 milliGRAM(s) IV Push every 4 hours PRN Severe Pain (7 - 10)  oxycodone    5 mG/acetaminophen 325 mG 1 Tablet(s) Oral every 6 hours PRN Moderate Pain (4 - 6)      Vital Signs Last 24 Hrs  T(C): 36.5 (01 May 2021 05:00), Max: 36.6 (30 Apr 2021 21:05)  T(F): 97.7 (01 May 2021 05:00), Max: 97.9 (30 Apr 2021 21:05)  HR: 75 (01 May 2021 05:00) (75 - 86)  BP: 108/56 (01 May 2021 05:00) (100/51 - 120/60)  BP(mean): --  RR: 18 (01 May 2021 05:00) (18 - 18)  SpO2: 98% (30 Apr 2021 21:05) (98% - 98%)    Physical Exam  NAD  Breathing comfortably on RA  Resting comfortably  LLE  Knee immobilizer and drain in place  ACE dressing c/d/i  Motor: TA/EHL/Gastroc intact  Sensory: SP/DP/Dueñas/Sa intact  Vasc: foot WWP, 2+ DP pulse    Labs                                   10.0   14.41 )-----------( 363      ( 30 Apr 2021 11:15 )             33.4         A/P: Patient is a 65y year old Male as above    WBAT on LLE  Further plan for drain removal pending  Surgery following for bilious drainage  DVT ppx: SCD, LVX  Abx: nafcillin  Pain control  Home medications: resume  Trend labs  Dispo: Pending PT recommendations

## 2021-05-02 LAB
ALBUMIN SERPL ELPH-MCNC: 2.8 G/DL — LOW (ref 3.5–5.2)
ALP SERPL-CCNC: 100 U/L — SIGNIFICANT CHANGE UP (ref 30–115)
ALT FLD-CCNC: 16 U/L — SIGNIFICANT CHANGE UP (ref 0–41)
ANION GAP SERPL CALC-SCNC: 12 MMOL/L — SIGNIFICANT CHANGE UP (ref 7–14)
AST SERPL-CCNC: 22 U/L — SIGNIFICANT CHANGE UP (ref 0–41)
BASOPHILS # BLD AUTO: 0.06 K/UL — SIGNIFICANT CHANGE UP (ref 0–0.2)
BASOPHILS NFR BLD AUTO: 0.6 % — SIGNIFICANT CHANGE UP (ref 0–1)
BILIRUB SERPL-MCNC: 0.6 MG/DL — SIGNIFICANT CHANGE UP (ref 0.2–1.2)
BUN SERPL-MCNC: 21 MG/DL — HIGH (ref 10–20)
CALCIUM SERPL-MCNC: 8.5 MG/DL — SIGNIFICANT CHANGE UP (ref 8.5–10.1)
CHLORIDE SERPL-SCNC: 100 MMOL/L — SIGNIFICANT CHANGE UP (ref 98–110)
CO2 SERPL-SCNC: 24 MMOL/L — SIGNIFICANT CHANGE UP (ref 17–32)
CREAT SERPL-MCNC: 0.9 MG/DL — SIGNIFICANT CHANGE UP (ref 0.7–1.5)
CULTURE RESULTS: SIGNIFICANT CHANGE UP
EOSINOPHIL # BLD AUTO: 0.59 K/UL — SIGNIFICANT CHANGE UP (ref 0–0.7)
EOSINOPHIL NFR BLD AUTO: 5.6 % — SIGNIFICANT CHANGE UP (ref 0–8)
GLUCOSE SERPL-MCNC: 80 MG/DL — SIGNIFICANT CHANGE UP (ref 70–99)
HCT VFR BLD CALC: 30.1 % — LOW (ref 42–52)
HGB BLD-MCNC: 8.9 G/DL — LOW (ref 14–18)
IMM GRANULOCYTES NFR BLD AUTO: 0.4 % — HIGH (ref 0.1–0.3)
LYMPHOCYTES # BLD AUTO: 1.19 K/UL — LOW (ref 1.2–3.4)
LYMPHOCYTES # BLD AUTO: 11.2 % — LOW (ref 20.5–51.1)
MAGNESIUM SERPL-MCNC: 2.1 MG/DL — SIGNIFICANT CHANGE UP (ref 1.8–2.4)
MCHC RBC-ENTMCNC: 21.1 PG — LOW (ref 27–31)
MCHC RBC-ENTMCNC: 29.6 G/DL — LOW (ref 32–37)
MCV RBC AUTO: 71.3 FL — LOW (ref 80–94)
MONOCYTES # BLD AUTO: 1.07 K/UL — HIGH (ref 0.1–0.6)
MONOCYTES NFR BLD AUTO: 10.1 % — HIGH (ref 1.7–9.3)
NEUTROPHILS # BLD AUTO: 7.64 K/UL — HIGH (ref 1.4–6.5)
NEUTROPHILS NFR BLD AUTO: 72.1 % — SIGNIFICANT CHANGE UP (ref 42.2–75.2)
NRBC # BLD: 0 /100 WBCS — SIGNIFICANT CHANGE UP (ref 0–0)
PLATELET # BLD AUTO: 324 K/UL — SIGNIFICANT CHANGE UP (ref 130–400)
POTASSIUM SERPL-MCNC: 3.5 MMOL/L — SIGNIFICANT CHANGE UP (ref 3.5–5)
POTASSIUM SERPL-SCNC: 3.5 MMOL/L — SIGNIFICANT CHANGE UP (ref 3.5–5)
PROT SERPL-MCNC: 6.3 G/DL — SIGNIFICANT CHANGE UP (ref 6–8)
RBC # BLD: 4.22 M/UL — LOW (ref 4.7–6.1)
RBC # FLD: 19.6 % — HIGH (ref 11.5–14.5)
SODIUM SERPL-SCNC: 136 MMOL/L — SIGNIFICANT CHANGE UP (ref 135–146)
SPECIMEN SOURCE: SIGNIFICANT CHANGE UP
WBC # BLD: 10.59 K/UL — SIGNIFICANT CHANGE UP (ref 4.8–10.8)
WBC # FLD AUTO: 10.59 K/UL — SIGNIFICANT CHANGE UP (ref 4.8–10.8)

## 2021-05-02 PROCEDURE — 99233 SBSQ HOSP IP/OBS HIGH 50: CPT

## 2021-05-02 PROCEDURE — 78226 HEPATOBILIARY SYSTEM IMAGING: CPT | Mod: 26

## 2021-05-02 RX ADMIN — POLYETHYLENE GLYCOL 3350 17 GRAM(S): 17 POWDER, FOR SOLUTION ORAL at 17:10

## 2021-05-02 RX ADMIN — NAFCILLIN 200 GRAM(S): 10 INJECTION, POWDER, FOR SOLUTION INTRAVENOUS at 05:19

## 2021-05-02 RX ADMIN — NAFCILLIN 200 GRAM(S): 10 INJECTION, POWDER, FOR SOLUTION INTRAVENOUS at 13:42

## 2021-05-02 RX ADMIN — NAFCILLIN 200 GRAM(S): 10 INJECTION, POWDER, FOR SOLUTION INTRAVENOUS at 17:09

## 2021-05-02 RX ADMIN — Medication 81 MILLIGRAM(S): at 13:40

## 2021-05-02 RX ADMIN — NAFCILLIN 200 GRAM(S): 10 INJECTION, POWDER, FOR SOLUTION INTRAVENOUS at 21:13

## 2021-05-02 RX ADMIN — MORPHINE SULFATE 4 MILLIGRAM(S): 50 CAPSULE, EXTENDED RELEASE ORAL at 09:50

## 2021-05-02 RX ADMIN — MORPHINE SULFATE 4 MILLIGRAM(S): 50 CAPSULE, EXTENDED RELEASE ORAL at 21:13

## 2021-05-02 RX ADMIN — PRASUGREL 10 MILLIGRAM(S): 5 TABLET, FILM COATED ORAL at 13:40

## 2021-05-02 RX ADMIN — MORPHINE SULFATE 4 MILLIGRAM(S): 50 CAPSULE, EXTENDED RELEASE ORAL at 14:00

## 2021-05-02 RX ADMIN — POLYETHYLENE GLYCOL 3350 17 GRAM(S): 17 POWDER, FOR SOLUTION ORAL at 05:18

## 2021-05-02 RX ADMIN — Medication 125 MICROGRAM(S): at 05:17

## 2021-05-02 RX ADMIN — MORPHINE SULFATE 4 MILLIGRAM(S): 50 CAPSULE, EXTENDED RELEASE ORAL at 13:43

## 2021-05-02 RX ADMIN — Medication 0.5 MILLIGRAM(S): at 00:39

## 2021-05-02 RX ADMIN — Medication 25 MILLIGRAM(S): at 05:17

## 2021-05-02 RX ADMIN — Medication 1 TABLET(S): at 13:40

## 2021-05-02 RX ADMIN — OXYCODONE AND ACETAMINOPHEN 1 TABLET(S): 5; 325 TABLET ORAL at 01:10

## 2021-05-02 RX ADMIN — MORPHINE SULFATE 4 MILLIGRAM(S): 50 CAPSULE, EXTENDED RELEASE ORAL at 05:19

## 2021-05-02 RX ADMIN — MORPHINE SULFATE 4 MILLIGRAM(S): 50 CAPSULE, EXTENDED RELEASE ORAL at 09:33

## 2021-05-02 RX ADMIN — Medication 0.5 MILLIGRAM(S): at 21:13

## 2021-05-02 RX ADMIN — Medication 500 MILLIGRAM(S): at 13:40

## 2021-05-02 RX ADMIN — SPIRONOLACTONE 25 MILLIGRAM(S): 25 TABLET, FILM COATED ORAL at 05:17

## 2021-05-02 RX ADMIN — DULOXETINE HYDROCHLORIDE 90 MILLIGRAM(S): 30 CAPSULE, DELAYED RELEASE ORAL at 13:40

## 2021-05-02 RX ADMIN — NAFCILLIN 200 GRAM(S): 10 INJECTION, POWDER, FOR SOLUTION INTRAVENOUS at 09:33

## 2021-05-02 RX ADMIN — NAFCILLIN 200 GRAM(S): 10 INJECTION, POWDER, FOR SOLUTION INTRAVENOUS at 02:05

## 2021-05-02 RX ADMIN — OXYCODONE AND ACETAMINOPHEN 1 TABLET(S): 5; 325 TABLET ORAL at 00:39

## 2021-05-02 RX ADMIN — PANTOPRAZOLE SODIUM 40 MILLIGRAM(S): 20 TABLET, DELAYED RELEASE ORAL at 05:17

## 2021-05-02 RX ADMIN — ATORVASTATIN CALCIUM 80 MILLIGRAM(S): 80 TABLET, FILM COATED ORAL at 21:13

## 2021-05-02 RX ADMIN — Medication 0.5 MILLIGRAM(S): at 10:25

## 2021-05-02 NOTE — PROGRESS NOTE ADULT - ATTENDING COMMENTS
Pt complaints of significant pain in left knee, but looks more comfortable today.     65 year old male with PMH of CAD s/p MI, PCI/CABG, CHFrEF (15-20%), has ICD, HTN, celiac disease, DM, neuropathy, chronic b/l LE ulcers, severe chronic pain,  hx infected R TKR with MRSA (was eventually cemented so has limited ROM R knee), and history of cholecystostomy tube placement in February 2020 for acute cholecystitis s/p removal in May 2020, with fistula formation and persistent bilious drainage from the prior tract site as well as a RUQ abdominal abscess at the site s/p drainage admitted for septic arthritis.     # Septic arthritis of Left knee - not better, pain not better  -  s/p arthrocentesis of left knee - consistent with bacterial septic infection   - s/p OR washout 4/26 - purulent fluid in left knee with significant tricompartmental arthritis   - Blood Cx persistently positive form 4/26 to 4/28 - MSSA,   - BCx 4/30 NGTD  - OR Cx 4/26 Staph aureus  - cont nafcillin as per ID, pt will need long course of ABx.   - f/u BCx from 05/01 and 05/02, no more Cx for now  - ortho on board, possible repeat I&D    # Persistent bacteremia, MSSA, likely from septic arthritis, r/o IE  - nava canceled due to positive COVID test  - will repeat COVID tests today     # biliary drainage from perc sudhir site  - Previous Intraabdominal 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 Decreased area of right upper quadrant subcutaneous stranding at site of prior percutaneous cholecystostomy, without evidence of abscess.  - pt needs ERCP and cholecystectomy, GI recommended to do it OP  - IR consult for possible vac dressing, they deferred it to surgery, surgery do not recommended vac dressing, just simple dry dressing, but amount of discharge is significant, surgery have to intervene   - HIDA scan done, pending results, surgery f/u on results  - f/u Cx from drain    # Positive COVID PCR - pt has high AB titer, had COVID and had vaccine, no clinical signs of infection, no need for isolation    # persistent pain due to septic arthritis - cont current regimen of opioids, laxatives to avoid constipation    # Chronic iron deficiency anemia with component of ACD - stable Hb      dvt ppx     #Progress Note Handoff  Pending possible re-I&D of left knee, HIDA scan, Cx from biliary drain, surgery follow up. Still acute.

## 2021-05-02 NOTE — PROGRESS NOTE ADULT - SUBJECTIVE AND OBJECTIVE BOX
----------Daily Progress Note----------    HISTORY OF PRESENT ILLNESS:  Patient is a 65y old Male who presents with a chief complaint of Septic arthritis (02 May 2021 08:09)    Currently admitted to medicine with the primary diagnosis of Knee pain, left       Today is hospital day 6d.     INTERVAL HOSPITAL COURSE / OVERNIGHT EVENTS:    Patient was examined and seen at bedside. This morning he is resting comfortably in bed and reports no new issues or overnight events.     Review of Systems: Otherwise unremarkable     <<<<<PAST MEDICAL & SURGICAL HISTORY>>>>>  Status post percutaneous transluminal coronary angioplasty  2 stents    Atherosclerosis of coronary artery  CAD (coronary artery disease)    Osteoarthritis    HLD (hyperlipidemia)    Blood clot due to device, implant, or graft  was on blood thinners    Diabetes  on insulin pump    HTN (hypertension)    CHF (congestive heart failure)  EF ~ 25%    History of celiac disease    Diverticulitis    STEMI (ST elevation myocardial infarction)    Diabetic neuropathy    Anxiety and depression    Other postprocedural status  Fixation hardware in foot LEFT    Stented coronary artery  10/18 heart attack  INFERIOR WALL MI    S/P CABG x 1  2018    S/P TKR (total knee replacement), right  with infection Mrsa   per pt he was cleared from MRSA infection    Surgery, elective  right knee wound debridement    History of open reduction and internal fixation (ORIF) procedure    H/O shoulder surgery  right    AICD (automatic cardioverter/defibrillator) present    Cholecystostomy care  drain inserted 2020 &amp; removed 4 months ago    History of tonsillectomy    History of hip replacement, total, right      ALLERGIES  ACE inhibitors (Hives)  carvedilol (Other)  enalapril (Hives)  Entresto (Other)      Home Medications:  ALPRAZolam 0.5 mg oral tablet: 1 tab(s) orally every 8 hours (26 Apr 2021 12:11)  aspirin 81 mg oral delayed release tablet: 1 tab(s) orally once a day (26 Apr 2021 12:11)  digoxin 125 mcg (0.125 mg) oral tablet: 1 tab(s) orally once a day (26 Apr 2021 12:11)  DULoxetine: 90 milligram(s) orally once a day (26 Apr 2021 12:11)  insulin: pump; HUMULIN (26 Apr 2021 12:11)  Jardiance 10 mg oral tablet: 1 tab(s) orally once a day (in the morning) (26 Apr 2021 12:11)  melatonin 10 mg oral tablet: 1 tab(s) orally once a day (at bedtime), As needed, Insomnia (26 Apr 2021 12:11)  metoprolol succinate 25 mg oral tablet, extended release: 1 tab(s) orally 2 times a day (26 Apr 2021 12:11)  morphine 15 mg oral tablet: 1 tab(s) orally every 4 hours, As Needed - for severe pain (7 to 10 out of 10) (26 Apr 2021 12:11)  prasugrel 10 mg oral tablet: 1 tab(s) orally once a day (26 Apr 2021 12:11)  spironolactone 25 mg oral tablet: 1 tab(s) orally once a day (26 Apr 2021 12:11)        MEDICATIONS  STANDING MEDICATIONS  ascorbic acid 500 milliGRAM(s) Oral daily  aspirin enteric coated 81 milliGRAM(s) Oral daily  atorvastatin 80 milliGRAM(s) Oral at bedtime  chlorhexidine 4% Liquid 1 Application(s) Topical <User Schedule>  dextrose 40% Gel 15 Gram(s) Oral once  dextrose 5%. 1000 milliLiter(s) IV Continuous <Continuous>  dextrose 5%. 1000 milliLiter(s) IV Continuous <Continuous>  dextrose 50% Injectable 25 Gram(s) IV Push once  dextrose 50% Injectable 12.5 Gram(s) IV Push once  dextrose 50% Injectable 25 Gram(s) IV Push once  digoxin     Tablet 125 MICROGram(s) Oral daily  DULoxetine 90 milliGRAM(s) Oral daily  glucagon  Injectable 1 milliGRAM(s) IntraMuscular once  metoprolol succinate ER 25 milliGRAM(s) Oral daily  multivitamin/minerals 1 Tablet(s) Oral daily  nafcillin  IVPB 2 Gram(s) IV Intermittent every 4 hours  nafcillin  IVPB      pantoprazole    Tablet 40 milliGRAM(s) Oral before breakfast  polyethylene glycol 3350 17 Gram(s) Oral two times a day  prasugrel 10 milliGRAM(s) Oral daily  spironolactone 25 milliGRAM(s) Oral daily    PRN MEDICATIONS  acetaminophen   Tablet .. 650 milliGRAM(s) Oral every 6 hours PRN  ALPRAZolam 0.5 milliGRAM(s) Oral every 8 hours PRN  furosemide    Tablet 40 milliGRAM(s) Oral daily PRN  melatonin 10 milliGRAM(s) Oral at bedtime PRN  morphine  - Injectable 4 milliGRAM(s) IV Push every 4 hours PRN  oxycodone    5 mG/acetaminophen 325 mG 1 Tablet(s) Oral every 6 hours PRN    VITALS:  T(F): 96.9  HR: 70  BP: 103/61  RR: 18  SpO2: 98%    <<<<<LABS>>>>>                        8.9    10.59 )-----------( 324      ( 02 May 2021 06:11 )             30.1     05-02    136  |  100  |  21<H>  ----------------------------<  80  3.5   |  24  |  0.9    Ca    8.5      02 May 2021 06:11  Phos  4.2     05-01  Mg     2.1     05-02    TPro  6.3  /  Alb  2.8<L>  /  TBili  0.6  /  DBili  x   /  AST  22  /  ALT  16  /  AlkPhos  100  05-02    PT/INR - ( 30 Apr 2021 11:15 )   PT: 14.50 sec;   INR: 1.26 ratio         PTT - ( 30 Apr 2021 11:15 )  PTT:26.1 sec          Culture - Blood (collected 30 Apr 2021 17:37)  Source: .Blood None  Preliminary Report (01 May 2021 23:02):    No growth to date.    Culture - Blood (collected 30 Apr 2021 11:15)  Source: .Blood None  Preliminary Report (01 May 2021 23:01):    No growth to date.    [id number]        <<<<<RADIOLOGY>>>>>    <<<<<PHYSICAL EXAM>>>>>  GENERAL: Well developed, well nourished and in no acute distress. Resting comfortably in bed.  HEENT: Normocephalic, atraumatic, mucous membranes moist, EOMI, PERRLA, bilateral sclera anicteric, no conjunctival injection  Neck: Supple, non-tender, no lymphadenopathy.  PULMONARY: Clear to auscultation bilaterally. No rales, rhonchi, or wheezing.  CARDIOVASCULAR: Regular rate and rhythm, S1-S2, no murmurs  GASTROINTESTINAL: Soft, non-tender, non-distended, no guarding.  RENAL: No CVA tenderness.  SKIN/EXTREMITIES: No clubbing or edema  NEUROLOGIC/MUSCULOSKELETAL: AOx4, grossly moving all extremities, no focal deficits.      -----------------------------------------------------------------------------------------------------------------------------------------------------------------------------------------------

## 2021-05-02 NOTE — PROGRESS NOTE ADULT - SUBJECTIVE AND OBJECTIVE BOX
Orthopaedics Progress Note    FLOWER MARISCAL  [id number]    Patient is a 65y year old Male with left septic knee  POD#6 from left knee I&D  One drain remains  Patient seen and examined bedside  Pain well controlled  No other complaints    acetaminophen   Tablet .. 650 milliGRAM(s) Oral every 6 hours PRN  ALPRAZolam 0.5 milliGRAM(s) Oral every 8 hours PRN  ascorbic acid 500 milliGRAM(s) Oral daily  aspirin enteric coated 81 milliGRAM(s) Oral daily  atorvastatin 80 milliGRAM(s) Oral at bedtime  chlorhexidine 4% Liquid 1 Application(s) Topical <User Schedule>  dextrose 40% Gel 15 Gram(s) Oral once  dextrose 5%. 1000 milliLiter(s) IV Continuous <Continuous>  dextrose 5%. 1000 milliLiter(s) IV Continuous <Continuous>  dextrose 50% Injectable 25 Gram(s) IV Push once  dextrose 50% Injectable 12.5 Gram(s) IV Push once  dextrose 50% Injectable 25 Gram(s) IV Push once  digoxin     Tablet 125 MICROGram(s) Oral daily  DULoxetine 90 milliGRAM(s) Oral daily  furosemide    Tablet 40 milliGRAM(s) Oral daily PRN  glucagon  Injectable 1 milliGRAM(s) IntraMuscular once  melatonin 10 milliGRAM(s) Oral at bedtime PRN  metoprolol succinate ER 25 milliGRAM(s) Oral daily  morphine  - Injectable 4 milliGRAM(s) IV Push every 4 hours PRN  multivitamin/minerals 1 Tablet(s) Oral daily  nafcillin  IVPB 2 Gram(s) IV Intermittent every 4 hours  nafcillin  IVPB      oxycodone    5 mG/acetaminophen 325 mG 1 Tablet(s) Oral every 6 hours PRN  pantoprazole    Tablet 40 milliGRAM(s) Oral before breakfast  polyethylene glycol 3350 17 Gram(s) Oral two times a day  prasugrel 10 milliGRAM(s) Oral daily  spironolactone 25 milliGRAM(s) Oral daily      T(C): 36.1 (05-02-21 @ 05:00), Max: 36.7 (05-01-21 @ 13:22)  HR: 70 (05-02-21 @ 05:00) (69 - 75)  BP: 103/61 (05-02-21 @ 05:00) (101/54 - 111/62)  RR: 18 (05-02-21 @ 05:00) (18 - 18)  SpO2: 98% (05-01-21 @ 21:05) (98% - 98%)    Physical Exam  NAD  Breathing comfortably on RA  Resting comfortably  LLE  Knee immobilizer and drain in place  ACE dressing c/d/i  Motor: TA/EHL/Gastroc intact  Sensory: SP/DP/Dueñas/Sa intact  Vasc: foot WWP, 2+ DP pulse    Labs                        9.1    12.34 )-----------( 314      ( 01 May 2021 06:49 )             29.8     05-02    136  |  100  |  21<H>  ----------------------------<  80  3.5   |  24  |  0.9    Ca    8.5      02 May 2021 06:11  Phos  4.2     05-01  Mg     2.1     05-02    TPro  6.3  /  Alb  2.8<L>  /  TBili  0.6  /  DBili  x   /  AST  22  /  ALT  16  /  AlkPhos  100  05-02    LIVER FUNCTIONS - ( 02 May 2021 06:11 )  Alb: 2.8 g/dL / Pro: 6.3 g/dL / ALK PHOS: 100 U/L / ALT: 16 U/L / AST: 22 U/L / GGT: x           PT/INR - ( 30 Apr 2021 11:15 )   PT: 14.50 sec;   INR: 1.26 ratio         PTT - ( 30 Apr 2021 11:15 )  PTT:26.1 sec    A/P: Patient is a 65y year old Male POD#6 from left knee I&D    WBAT on LLE  Further plan for drain removal pending  Surgery following for bilious drainage  DVT ppx: SCD, LVX  Abx: nafcillin  Pain control  Home medications: resume  Trend labs  Dispo: Pending PT recommendations

## 2021-05-03 DIAGNOSIS — M25.562 PAIN IN LEFT KNEE: ICD-10-CM

## 2021-05-03 LAB
ALBUMIN SERPL ELPH-MCNC: 2.8 G/DL — LOW (ref 3.5–5.2)
ALP SERPL-CCNC: 101 U/L — SIGNIFICANT CHANGE UP (ref 30–115)
ALT FLD-CCNC: 15 U/L — SIGNIFICANT CHANGE UP (ref 0–41)
ANION GAP SERPL CALC-SCNC: 9 MMOL/L — SIGNIFICANT CHANGE UP (ref 7–14)
APTT BLD: 27.6 SEC — SIGNIFICANT CHANGE UP (ref 27–39.2)
AST SERPL-CCNC: 16 U/L — SIGNIFICANT CHANGE UP (ref 0–41)
BASOPHILS # BLD AUTO: 0.06 K/UL — SIGNIFICANT CHANGE UP (ref 0–0.2)
BASOPHILS NFR BLD AUTO: 0.6 % — SIGNIFICANT CHANGE UP (ref 0–1)
BILIRUB SERPL-MCNC: 0.7 MG/DL — SIGNIFICANT CHANGE UP (ref 0.2–1.2)
BUN SERPL-MCNC: 19 MG/DL — SIGNIFICANT CHANGE UP (ref 10–20)
CALCIUM SERPL-MCNC: 8.4 MG/DL — LOW (ref 8.5–10.1)
CHLORIDE SERPL-SCNC: 100 MMOL/L — SIGNIFICANT CHANGE UP (ref 98–110)
CO2 SERPL-SCNC: 29 MMOL/L — SIGNIFICANT CHANGE UP (ref 17–32)
CREAT SERPL-MCNC: 0.8 MG/DL — SIGNIFICANT CHANGE UP (ref 0.7–1.5)
EOSINOPHIL # BLD AUTO: 0.3 K/UL — SIGNIFICANT CHANGE UP (ref 0–0.7)
EOSINOPHIL NFR BLD AUTO: 2.9 % — SIGNIFICANT CHANGE UP (ref 0–8)
GLUCOSE SERPL-MCNC: 260 MG/DL — HIGH (ref 70–99)
HCT VFR BLD CALC: 27.2 % — LOW (ref 42–52)
HGB BLD-MCNC: 8.3 G/DL — LOW (ref 14–18)
IMM GRANULOCYTES NFR BLD AUTO: 0.6 % — HIGH (ref 0.1–0.3)
INR BLD: 1.3 RATIO — SIGNIFICANT CHANGE UP (ref 0.65–1.3)
LYMPHOCYTES # BLD AUTO: 1.14 K/UL — LOW (ref 1.2–3.4)
LYMPHOCYTES # BLD AUTO: 11 % — LOW (ref 20.5–51.1)
MAGNESIUM SERPL-MCNC: 2.1 MG/DL — SIGNIFICANT CHANGE UP (ref 1.8–2.4)
MCHC RBC-ENTMCNC: 21.3 PG — LOW (ref 27–31)
MCHC RBC-ENTMCNC: 30.5 G/DL — LOW (ref 32–37)
MCV RBC AUTO: 69.9 FL — LOW (ref 80–94)
MONOCYTES # BLD AUTO: 1.04 K/UL — HIGH (ref 0.1–0.6)
MONOCYTES NFR BLD AUTO: 10 % — HIGH (ref 1.7–9.3)
NEUTROPHILS # BLD AUTO: 7.81 K/UL — HIGH (ref 1.4–6.5)
NEUTROPHILS NFR BLD AUTO: 74.9 % — SIGNIFICANT CHANGE UP (ref 42.2–75.2)
NRBC # BLD: 0 /100 WBCS — SIGNIFICANT CHANGE UP (ref 0–0)
PLATELET # BLD AUTO: 367 K/UL — SIGNIFICANT CHANGE UP (ref 130–400)
POTASSIUM SERPL-MCNC: 4.4 MMOL/L — SIGNIFICANT CHANGE UP (ref 3.5–5)
POTASSIUM SERPL-SCNC: 4.4 MMOL/L — SIGNIFICANT CHANGE UP (ref 3.5–5)
PROT SERPL-MCNC: 6.5 G/DL — SIGNIFICANT CHANGE UP (ref 6–8)
PROTHROM AB SERPL-ACNC: 14.9 SEC — HIGH (ref 9.95–12.87)
RBC # BLD: 3.89 M/UL — LOW (ref 4.7–6.1)
RBC # FLD: 19.3 % — HIGH (ref 11.5–14.5)
SARS-COV-2 RNA SPEC QL NAA+PROBE: SIGNIFICANT CHANGE UP
SELENIUM SERPL-MCNC: 101 UG/L — SIGNIFICANT CHANGE UP (ref 93–198)
SODIUM SERPL-SCNC: 138 MMOL/L — SIGNIFICANT CHANGE UP (ref 135–146)
WBC # BLD: 10.41 K/UL — SIGNIFICANT CHANGE UP (ref 4.8–10.8)
WBC # FLD AUTO: 10.41 K/UL — SIGNIFICANT CHANGE UP (ref 4.8–10.8)

## 2021-05-03 PROCEDURE — 99233 SBSQ HOSP IP/OBS HIGH 50: CPT

## 2021-05-03 RX ORDER — MORPHINE SULFATE 50 MG/1
15 CAPSULE, EXTENDED RELEASE ORAL
Refills: 0 | Status: DISCONTINUED | OUTPATIENT
Start: 2021-05-03 | End: 2021-05-10

## 2021-05-03 RX ORDER — SENNA PLUS 8.6 MG/1
2 TABLET ORAL AT BEDTIME
Refills: 0 | Status: DISCONTINUED | OUTPATIENT
Start: 2021-05-03 | End: 2021-05-27

## 2021-05-03 RX ORDER — GABAPENTIN 400 MG/1
100 CAPSULE ORAL EVERY 8 HOURS
Refills: 0 | Status: DISCONTINUED | OUTPATIENT
Start: 2021-05-03 | End: 2021-05-19

## 2021-05-03 RX ORDER — MORPHINE SULFATE 50 MG/1
6 CAPSULE, EXTENDED RELEASE ORAL EVERY 4 HOURS
Refills: 0 | Status: DISCONTINUED | OUTPATIENT
Start: 2021-05-03 | End: 2021-05-10

## 2021-05-03 RX ADMIN — Medication 1 TABLET(S): at 11:09

## 2021-05-03 RX ADMIN — MORPHINE SULFATE 4 MILLIGRAM(S): 50 CAPSULE, EXTENDED RELEASE ORAL at 08:51

## 2021-05-03 RX ADMIN — NAFCILLIN 200 GRAM(S): 10 INJECTION, POWDER, FOR SOLUTION INTRAVENOUS at 08:56

## 2021-05-03 RX ADMIN — MORPHINE SULFATE 15 MILLIGRAM(S): 50 CAPSULE, EXTENDED RELEASE ORAL at 17:17

## 2021-05-03 RX ADMIN — NAFCILLIN 200 GRAM(S): 10 INJECTION, POWDER, FOR SOLUTION INTRAVENOUS at 17:17

## 2021-05-03 RX ADMIN — Medication 25 MILLIGRAM(S): at 05:16

## 2021-05-03 RX ADMIN — MORPHINE SULFATE 6 MILLIGRAM(S): 50 CAPSULE, EXTENDED RELEASE ORAL at 21:29

## 2021-05-03 RX ADMIN — Medication 10 MILLIGRAM(S): at 00:00

## 2021-05-03 RX ADMIN — DULOXETINE HYDROCHLORIDE 90 MILLIGRAM(S): 30 CAPSULE, DELAYED RELEASE ORAL at 11:09

## 2021-05-03 RX ADMIN — PANTOPRAZOLE SODIUM 40 MILLIGRAM(S): 20 TABLET, DELAYED RELEASE ORAL at 05:18

## 2021-05-03 RX ADMIN — POLYETHYLENE GLYCOL 3350 17 GRAM(S): 17 POWDER, FOR SOLUTION ORAL at 05:17

## 2021-05-03 RX ADMIN — Medication 81 MILLIGRAM(S): at 11:09

## 2021-05-03 RX ADMIN — OXYCODONE AND ACETAMINOPHEN 1 TABLET(S): 5; 325 TABLET ORAL at 00:00

## 2021-05-03 RX ADMIN — GABAPENTIN 100 MILLIGRAM(S): 400 CAPSULE ORAL at 21:26

## 2021-05-03 RX ADMIN — Medication 125 MICROGRAM(S): at 05:16

## 2021-05-03 RX ADMIN — ATORVASTATIN CALCIUM 80 MILLIGRAM(S): 80 TABLET, FILM COATED ORAL at 21:26

## 2021-05-03 RX ADMIN — SPIRONOLACTONE 25 MILLIGRAM(S): 25 TABLET, FILM COATED ORAL at 05:17

## 2021-05-03 RX ADMIN — NAFCILLIN 200 GRAM(S): 10 INJECTION, POWDER, FOR SOLUTION INTRAVENOUS at 21:26

## 2021-05-03 RX ADMIN — NAFCILLIN 200 GRAM(S): 10 INJECTION, POWDER, FOR SOLUTION INTRAVENOUS at 12:54

## 2021-05-03 RX ADMIN — SENNA PLUS 2 TABLET(S): 8.6 TABLET ORAL at 21:26

## 2021-05-03 RX ADMIN — MORPHINE SULFATE 6 MILLIGRAM(S): 50 CAPSULE, EXTENDED RELEASE ORAL at 15:01

## 2021-05-03 RX ADMIN — OXYCODONE AND ACETAMINOPHEN 1 TABLET(S): 5; 325 TABLET ORAL at 00:30

## 2021-05-03 RX ADMIN — PRASUGREL 10 MILLIGRAM(S): 5 TABLET, FILM COATED ORAL at 11:09

## 2021-05-03 RX ADMIN — NAFCILLIN 200 GRAM(S): 10 INJECTION, POWDER, FOR SOLUTION INTRAVENOUS at 05:17

## 2021-05-03 RX ADMIN — MORPHINE SULFATE 4 MILLIGRAM(S): 50 CAPSULE, EXTENDED RELEASE ORAL at 01:46

## 2021-05-03 RX ADMIN — NAFCILLIN 200 GRAM(S): 10 INJECTION, POWDER, FOR SOLUTION INTRAVENOUS at 01:01

## 2021-05-03 RX ADMIN — MORPHINE SULFATE 6 MILLIGRAM(S): 50 CAPSULE, EXTENDED RELEASE ORAL at 21:45

## 2021-05-03 RX ADMIN — CHLORHEXIDINE GLUCONATE 1 APPLICATION(S): 213 SOLUTION TOPICAL at 05:16

## 2021-05-03 RX ADMIN — MORPHINE SULFATE 4 MILLIGRAM(S): 50 CAPSULE, EXTENDED RELEASE ORAL at 01:16

## 2021-05-03 RX ADMIN — Medication 500 MILLIGRAM(S): at 11:09

## 2021-05-03 NOTE — HISTORY OF PRESENT ILLNESS
[FreeTextEntry1] : Patient Name: FLOWER MARISCAL \par MRN: [id number] \par [place] MRN: [id number] \par Referring Provider: n/a\par ID: Dr. Vaughan, Dr. Casillas\par Ortho: Dr. Jose Castro \par Date: 4/13/2021\par \par Diagnosis: gangrenous cholecystitis\par Operative Date: 4/8/2021\par Procedure: I&D of cholecystocutaneous fistula\par \par \par He presents for a scheduled post operative visit. He is 5 days post op and feels well. He has been changing the gauze over his wick twice a day and it has moderate amounts of serosanguineous drainage. \par \par Currently, Mr. MARISCAL denies abdominal pain and discomfort. He denies fevers, chills, or night sweats. He is tolerating a regular diet and is having regular bowel movements. Pain is controlled.\par \par Final Pathology Showed: Pending\par \par

## 2021-05-03 NOTE — PROGRESS NOTE ADULT - SUBJECTIVE AND OBJECTIVE BOX
SUBJ: Patient seen and examined. Complains of knee pain, now better with pain meds.       MEDICATIONS  (STANDING):  ascorbic acid 500 milliGRAM(s) Oral daily  aspirin enteric coated 81 milliGRAM(s) Oral daily  atorvastatin 80 milliGRAM(s) Oral at bedtime  chlorhexidine 4% Liquid 1 Application(s) Topical <User Schedule>  dextrose 40% Gel 15 Gram(s) Oral once  dextrose 5%. 1000 milliLiter(s) (50 mL/Hr) IV Continuous <Continuous>  dextrose 5%. 1000 milliLiter(s) (100 mL/Hr) IV Continuous <Continuous>  dextrose 50% Injectable 25 Gram(s) IV Push once  dextrose 50% Injectable 12.5 Gram(s) IV Push once  dextrose 50% Injectable 25 Gram(s) IV Push once  digoxin     Tablet 125 MICROGram(s) Oral daily  DULoxetine 90 milliGRAM(s) Oral daily  gabapentin 100 milliGRAM(s) Oral every 8 hours  glucagon  Injectable 1 milliGRAM(s) IntraMuscular once  metoprolol succinate ER 25 milliGRAM(s) Oral daily  morphine ER Tablet 15 milliGRAM(s) Oral two times a day  multivitamin/minerals 1 Tablet(s) Oral daily  nafcillin  IVPB 2 Gram(s) IV Intermittent every 4 hours  nafcillin  IVPB      pantoprazole    Tablet 40 milliGRAM(s) Oral before breakfast  polyethylene glycol 3350 17 Gram(s) Oral two times a day  prasugrel 10 milliGRAM(s) Oral daily  senna 2 Tablet(s) Oral at bedtime  spironolactone 25 milliGRAM(s) Oral daily    MEDICATIONS  (PRN):  acetaminophen   Tablet .. 650 milliGRAM(s) Oral every 6 hours PRN Temp greater or equal to 38C (100.4F), Mild Pain (1 - 3)  ALPRAZolam 0.5 milliGRAM(s) Oral every 8 hours PRN anxiety  furosemide    Tablet 40 milliGRAM(s) Oral daily PRN fluid overload  melatonin 10 milliGRAM(s) Oral at bedtime PRN Insomnia  morphine  - Injectable 6 milliGRAM(s) IV Push every 4 hours PRN Severe Pain (7 - 10)            Vital Signs Last 24 Hrs  T(C): 36.4 (03 May 2021 20:57), Max: 36.8 (03 May 2021 12:32)  T(F): 97.6 (03 May 2021 20:57), Max: 98.3 (03 May 2021 12:32)  HR: 80 (03 May 2021 20:57) (71 - 80)  BP: 111/55 (03 May 2021 20:57) (104/54 - 115/59)  BP(mean): --  RR: 18 (03 May 2021 20:57) (18 - 18)  SpO2: --     REVIEW OF SYSTEMS:  CONSTITUTIONAL: No fever  NECK: No pain or stiffness  CARDIOVASCULAR: patient denies chest pain, shortness of breath or palpitations .  Repiratory: No cough or wheezing.  NEUROLOGICAL: No focal deficits to report.  GI: no BRBPR, no N,V,diarrhea.    PSYCHIATRY: normal mood and affect  HEENT: no nasal discharge, no ecchymosis        PHYSICAL EXAM:  GENERAL: NAD  HEAD:  Atraumatic, Normocephalic  NECK: Supple, No JVD  NERVOUS SYSTEM:  Alert & Oriented X3, Good concentration  CHEST/LUNG: Clear to anterior auscultation bilaterally  HEART: Regular rate and rhythm;   ABDOMEN: Soft, Nontender, Nondistended;   EXTREMITIES:  No  edema      LABS:                        8.3    10.41 )-----------( 367      ( 03 May 2021 07:47 )             27.2     05-03    138  |  100  |  19  ----------------------------<  260<H>  4.4   |  29  |  0.8    Ca    8.4<L>      03 May 2021 07:47  Mg     2.1     05-03    TPro  6.5  /  Alb  2.8<L>  /  TBili  0.7  /  DBili  x   /  AST  16  /  ALT  15  /  AlkPhos  101  05-03        PT/INR - ( 03 May 2021 07:47 )   PT: 14.90 sec;   INR: 1.30 ratio         PTT - ( 03 May 2021 07:47 )  PTT:27.6 sec    I&O's Summary    02 May 2021 07:01  -  03 May 2021 07:00  --------------------------------------------------------  IN: 0 mL / OUT: 633 mL / NET: -633 mL    03 May 2021 07:01  -  03 May 2021 21:32  --------------------------------------------------------  IN: 0 mL / OUT: 400 mL / NET: -400 mL      BNP  RADIOLOGY & ADDITIONAL STUDIES:    IMPRESSION AND PLAN:  CAD and ICM   Septic Knee  MSSA bacteremia   - Management as per primary team  - Euvolemic continue to monitor I&O  - Continue ASA and Prasugrel   - JOHN PAUL planned for Wed  - Will follow        no

## 2021-05-03 NOTE — PROGRESS NOTE ADULT - ASSESSMENT
Assessment and Plan  Case of a 65 year old male with PMH of CAD s/p MI, PCI/CABG, CHFrEF (15-20%), has ICD, HTN, celiac disease, DM, neuropathy, chronic b/l LE ulcers, severe chronic pain,  hx infected R TKR with MRSA (was eventually cemented so has limited ROM R knee), and history of cholecystostomy tube placement in February 2020 for acute cholecystitis s/p removal in May 2020, with fistula formation and persistent bilious drainage from the prior tract site as well as a RUQ abdominal abscess at the site s/p drainage who presented on 04/25 with swelling and pain in left knee, found to have septic arthritis, s/p arthroscopic drainage of left knee on 04/26 by orthopedics, currently on IV antibiotics. Currently hemodynamically stable.      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)  Uncontrolled Pain  * XR bilateral knees (04/26) large joint effusion in left knee  * s/p arthroscopic drainage of left knee on 04/26 and OR washout 4/26 - 20mL purulent fluid in left knee   * Synovial Culture (04/26) MSSA  * Blood Culture (04/26) x2, 04/27 NGTD, 04/28 x2 SJ, 04/30-05/01 NGTD, 05/02 and 05/03 received  * Swab culture 04/26 MSSA    - Orthopedics on board  --> s/p arthroscopic drainage of left knee on 04/26 and OR washout 4/26 - 20mL purulent fluid in left knee   --> Contacted 05/03: will remove drain today per team  --> Might consider re-incision and drainage  - Physical therapy on board for WBAT along left LE but patient refused on 05/01 due to pain  - ID on board Dr Degroot  --> Last T 101 04/26. Tylenol 650mg Q6h PRN for fever  --> Trend WBC 05/02 10.59  --> Continue IV Nafcillin 2g Q4h (04/27). Was on IV Daptomycin, Cefepime, and Cefazolin from 04/26-04/27  --> Daily blood cultures in setting of MSSA bacteremia: Blood Culture (04/26) x2, 04/27 NGTD, 04/28 x2 SJ, 04/30-05/01 NGTD, 05/02 and 05/03 received  --> Synovial Culture (04/26) MSSA  --> Swab culture 04/26 MSSA  - Inflammatory Markers  -->  04/25  -->  04/25  --> LA 1.4 04/25 s/p 1L LR in ED 04/25  - For pain control  --> Pain management team consulted 05/03  --> Continue Percocet 5/325 Q6h PRN for moderate pain  --> Increased morphine IV from 4 to 6mg Q4h PRN for severe pain on 05/03  --> Started PO Morphine ER 15mg BID 05/03  --> Monitor BM: last on 05/01  --> Continue PEG 17 BID and added Senna 05/03      Persistent MSSA bacteremia Likely from septic arthritis  Rule Out Vegetative Infective Endocarditis  * History of MRSA infection in right knee s/p right TKR  * Blood Culture (04/26) x2, 04/27 NGTD, 04/28 x2 SJ, 04/30-05/01 NGTD, 05/02 and 05/03 received  * Synovial Culture (04/26) MSSA  * Swab culture 04/26 MSSA  * TTE (04/27) noted, no vegetation noted    -  ID on board Dr Degroot  --> Last T 101 04/26. Tylenol 650mg Q6h PRN for fever  --> Trend WBC 05/02 10.59  --> Continue IV Nafcillin 2g Q4h (04/27). Was on IV Daptomycin, Cefepime, and Cefazolin from 04/26-04/27  --> Daily blood cultures in setting of MSSA bacteremia: Blood Culture (04/26) x2, 04/27 NGTD, 04/28 x2 SJ, 04/30-05/01 NGTD, 05/02 and 05/03 received  --> Synovial Culture (04/26) MSSA  --> Swab culture 04/26 MSSA  - Cardiology on board for JOHN PAUL: postponed due to positive COVID swab on 04/29 -> repeat 05/02 negative so will follow up with cardiology for JOHN PAUL plan  - If JOHN PAUL reveals a vegetation, will have to remove AICD. 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 Decreased area of right upper quadrant subcutaneous stranding at site of prior percutaneous cholecystostomy, without evidence of abscess.  * HIDA scan 05/02: cannot visualize GB    -  ID on board Dr Degroot  --> Last T 101 04/26. Tylenol 650mg Q6h PRN for fever  --> Trend WBC 05/02 10.59  --> Continue IV Nafcillin 2g Q4h (04/27). Was on IV Daptomycin, Cefepime, and Cefazolin from 04/26-04/27  - GI on board Dr Johansen   --> Contacted Again 05/03: outpatient ERCP and cholecystectomy to divert bile and heal fistula, recommended vac wound  - IR consult for possible vac dressing, they deferred it to surgery, surgery do not recommended vac dressing, just simple dry dressing  - Gram stain of fluid from drain: 05/01 negative      Positive COVID PCR 04/29  * Repeat on 05/02 negative  * High AB titer  * Had COVID and had vaccine  * No clinical signs of infection, no need for isolation  - ID on board  - No isolation      Chronic iron deficiency anemia with component of ACD - stable Hb    * Iron studies noted from 04/27  - Trend CBC daily 05/01 9.1 -> 05/02 8.9 -> 8.3 05/03  - Active Type and Screen 05/03  - Continue multivitamins and ascorbic acid      DM II   * Last a1c 9.1 04/29  * Home insulin pump  - Endocrinology on board Dr Sutherland  - Monitor POCT premeals and at bedtime: 04/30 281, 212, 154, 186  - Continue insulin pump at 2u/hour: patient prefers to manage alone      Heart Failure with reduced EF  S/P AICD  * TTE 04/27 EF 25%, mild-mod MR, mild-mod TR, no pericardial effusion, PHTN  * Home meds lasix 40mg PRN, Aldactone, Toprol 25mg BID, digoxin    - EP on board: s/p interrogation of AICD 04/28 (normally functioning)  - Cardiology Dr Nelson is on board  - Monitor I/O  - Monitor daily weight  - Monitor SaO2 and O2 requirements on RA 98%  - Continue Lasix PO 40mg PRN and aldactone 25mg QD   - Continue Digoxin 125mg QD  - Monitor CXR daily      CAD  * Follows Dr Lopez  * S/P CABG  * Lipid profile from 03/13/21 noted  * Home meds Aspirin, Prasugrel, Rosuvastatin 40mg QD  * No troponins     - Continue Aspirin 81mg QD and Prasugrel 10mg QD  - Continue Rosuvastatin 40mg QD. Lipid profile from 03/13/21 noted  - Toprol 25mg QD      Others  - DVT Prophylaxis: off Lovenox 40mg Subcutaneously daily since 04/30  - GI Prophylaxis: Pantoprazole 40mg PO QD  - Diet: DASH/ TLC  - Code Status: Full    Barriers to learning: YES/NO  Discharge Planning: Patient will be discharged once stable and  Plan was communicated with   Education given to:   Educated about:       Stella Valverde MD  PGY - 1 Internal Medicine   [facility]

## 2021-05-03 NOTE — PROGRESS NOTE ADULT - ATTENDING COMMENTS
Pt. seen/ examined  Labs/ vitals reviewed  WBC trending down  Pain control improved after removal of the drain  Will follow ESR/ CRP  If increasing, may consider re-I&D

## 2021-05-03 NOTE — PROGRESS NOTE ADULT - SUBJECTIVE AND OBJECTIVE BOX
Orthopaedics Progress Note    FLOWER MARISCAL  [id number]    Patient is a 65y year old Male with left septic knee  POD#7 from left knee I&D  One drain remains  Patient seen and examined bedside  Pt says pain is comparable to prior to I&D  Note wearing knee immobilizer  Denies fevers, chills, nausea, vomting, cp, sob    acetaminophen   Tablet .. 650 milliGRAM(s) Oral every 6 hours PRN  ALPRAZolam 0.5 milliGRAM(s) Oral every 8 hours PRN  ascorbic acid 500 milliGRAM(s) Oral daily  aspirin enteric coated 81 milliGRAM(s) Oral daily  atorvastatin 80 milliGRAM(s) Oral at bedtime  chlorhexidine 4% Liquid 1 Application(s) Topical <User Schedule>  dextrose 40% Gel 15 Gram(s) Oral once  dextrose 5%. 1000 milliLiter(s) IV Continuous <Continuous>  dextrose 5%. 1000 milliLiter(s) IV Continuous <Continuous>  dextrose 50% Injectable 25 Gram(s) IV Push once  dextrose 50% Injectable 12.5 Gram(s) IV Push once  dextrose 50% Injectable 25 Gram(s) IV Push once  digoxin     Tablet 125 MICROGram(s) Oral daily  DULoxetine 90 milliGRAM(s) Oral daily  furosemide    Tablet 40 milliGRAM(s) Oral daily PRN  glucagon  Injectable 1 milliGRAM(s) IntraMuscular once  melatonin 10 milliGRAM(s) Oral at bedtime PRN  metoprolol succinate ER 25 milliGRAM(s) Oral daily  morphine  - Injectable 4 milliGRAM(s) IV Push every 4 hours PRN  multivitamin/minerals 1 Tablet(s) Oral daily  nafcillin  IVPB 2 Gram(s) IV Intermittent every 4 hours  nafcillin  IVPB      oxycodone    5 mG/acetaminophen 325 mG 1 Tablet(s) Oral every 6 hours PRN  pantoprazole    Tablet 40 milliGRAM(s) Oral before breakfast  polyethylene glycol 3350 17 Gram(s) Oral two times a day  prasugrel 10 milliGRAM(s) Oral daily  spironolactone 25 milliGRAM(s) Oral daily      T(C): 36.4 (05-03-21 @ 04:43), Max: 36.4 (05-03-21 @ 04:43)  HR: 71 (05-03-21 @ 04:43) (71 - 71)  BP: 115/59 (05-03-21 @ 04:43) (115/59 - 115/59)  RR: 18 (05-03-21 @ 04:43) (18 - 18)  SpO2: --    Physical Exam  NAD, resting comfortably  Breathing comfortably on RA    LLE  Dressing c/d/i  Drain in place  Motor: TA/EHL/Gastroc intact  Sensory: SP/DP/Dueñas/Sa intact  Vasc: foot WWP, 2+ DP pulse    Labs                        8.9    10.59 )-----------( 324      ( 02 May 2021 06:11 )             30.1     05-02    136  |  100  |  21<H>  ----------------------------<  80  3.5   |  24  |  0.9    Ca    8.5      02 May 2021 06:11  Mg     2.1     05-02    TPro  6.3  /  Alb  2.8<L>  /  TBili  0.6  /  DBili  x   /  AST  22  /  ALT  16  /  AlkPhos  100  05-02    LIVER FUNCTIONS - ( 02 May 2021 06:11 )  Alb: 2.8 g/dL / Pro: 6.3 g/dL / ALK PHOS: 100 U/L / ALT: 16 U/L / AST: 22 U/L / GGT: x             A/P: Patient is a 65y year old Male POD#7 from left knee I&D    WBAT on LLE  Further plan for drain removal pending  Surgery following for bilious drainage  DVT ppx: SCD, LVX  Abx: nafcillin  Pain control  Home medications: resume  Trend labs  Dispo: Pending PT recommendations

## 2021-05-03 NOTE — CONSULT NOTE ADULT - ASSESSMENT
Patient with multiple comorbidities, admitted for septic arthritis, pain team consulted for patient's persistent lower extremity pain. At this time, the patient states that his pain is well controlled with the current regimen, consisting of percocet 5/325 q6 PRN for moderate pain, morphine 6mg q4 PRN for severe pain, and morphine PO ER 15mg BID. Patient is also on a bowel regiment in light of opioids.    Patient states that his bilateral lower extremity pain is currently a tolerable 5/10, but at it's worst can reach a 10/10 level.   -Continue current management. patient has been initiated on morphine PO ER today and has so far tolerated it well, with improvement in his pain.  -Consider adding gabapentin 100mg TID for opioid sparing effect.

## 2021-05-03 NOTE — ASSESSMENT
[FreeTextEntry1] : I) normal postop\par \par P) will RTC next week for suture removal\par \par Margarito King MD\par \par Chief Surgical Oncology\par Multidisciplinary GI cancer program\par [facility]\par [facility]\par \par Professor Surgery\par [employer]\par

## 2021-05-03 NOTE — CONSULT NOTE ADULT - PROBLEM SELECTOR RECOMMENDATION 9
Patient states that his bilateral lower extremity pain is currently a tolerable 5/10, but at it's worst can reach a 10/10 level.   -Continue current management. patient has been initiated on morphine PO ER today and has so far tolerated it well, with improvement in his pain.  -Consider adding gabapentin 100mg TID for opioid sparing effect.

## 2021-05-03 NOTE — CONSULT NOTE ADULT - SUBJECTIVE AND OBJECTIVE BOX
65 year old male with PMH of CAD s/p MI, PCI/CABG, CHFrEF (15-20%), has ICD, HTN, celiac disease, DM, neuropathy, chronic b/l LE ulcers, severe chronic pain,  hx infected R TKR with MRSA (was eventually cemented so has limited ROM R knee), and history of cholecystostomy tube placement in February 2020 for acute cholecystitis s/p removal in May 2020, with fistula formation and persistent bilious drainage from the prior tract site as well as a RUQ abdominal abscess at the site s/p drainage who presented on 04/25 with swelling and pain in left knee, found to have septic arthritis, s/p arthroscopic drainage of left knee on 04/26 by orthopedics, pain service consulted for patient's persistent pain 65 year old male with PMH of CAD s/p MI, PCI/CABG, CHFrEF (15-20%), has ICD, HTN, celiac disease, DM, neuropathy, chronic b/l LE ulcers, severe chronic pain,  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

## 2021-05-03 NOTE — PROGRESS NOTE ADULT - SUBJECTIVE AND OBJECTIVE BOX
Progress Note  This morning patient was seen and examined at bedside.    Today is hospital day 7d.  Mr. Blas is doing fine.   He reports he had pain overnight and asked for morphine and oxycodone.   Tani still can't move in bed due to fear of pain exacerbation.  His appetite is reduced, and he denies nausea or vomiting.   He denies any abdominal pain, diarrhea, or constipation. His last bowel movement was 05/01.   He denies any shortness of breath, chest pain, palpitations, or light headedness.   He is voiding freely and denies urinary symptoms (dysuria, urgency, frequency, intermittence).      Vital Signs in the last 24 hours   Vitals Summary T(C): 36.4 (05-03-21 @ 04:43), Max: 36.4 (05-03-21 @ 04:43)  HR: 71 (05-03-21 @ 04:43) (71 - 71)  BP: 115/59 (05-03-21 @ 04:43) (115/59 - 115/59)  RR: 18 (05-03-21 @ 04:43) (18 - 18)  SpO2: --  Vent Data   Intake/ Output   05-02-21 @ 07:01  -  05-03-21 @ 07:00  --------------------------------------------------------  IN: 0 mL / OUT: 633 mL / NET: -633 mL    05-03-21 @ 07:01  -  05-03-21 @ 12:07  --------------------------------------------------------  IN: 0 mL / OUT: 400 mL / NET: -400 mL      Physical Exam  * General Appearance: Alert, cooperative, interactive, oriented to time, place, and person, in some pain   * Head: Normocephalic, without obvious abnormality, atraumatic  * Lungs: Respirations unlabored, Good bilateral air entry, normal breath sounds (Clear to auscultation bilaterally, no audible wheezes, crackles, or rhonchi)  * Heart: Regular Rate and Rhythm, normal S1 and S2, no audible murmur, rub, or gallop  * Abdomen: Symmetric, non-distended, no scar, soft, non-tender, bowel sounds active all four quadrants, no masses, no organomegaly (no hepatosplenomegaly)  * Extremities: left knee with wound dressing and drain (blood draining), cemented right knee with scar, thin LE, no pitting edema, ulcers along anterior shaft of right LE, adequate dorsalis pedis pulses  * Pulses: 2+ and symmetric all extremities  * Skin: Skin color, texture, turgor normal, no rashes or lesions  * Lymph nodes: Cervical, supraclavicular, and axillary nodes normal      Investigations   Laboratory Workup  - CBC:                        8.3    10.41 )-----------( 367      ( 03 May 2021 07:47 )             27.2     - Chemistry:  05-03    138  |  100  |  19  ----------------------------<  260<H>  4.4   |  29  |  0.8    Ca    8.4<L>      03 May 2021 07:47  Mg     2.1     05-03    TPro  6.5  /  Alb  2.8<L>  /  TBili  0.7  /  DBili  x   /  AST  16  /  ALT  15  /  AlkPhos  101  05-03    - Coagulation Studies:  PT/INR - ( 03 May 2021 07:47 )   PT: 14.90 sec;   INR: 1.30 ratio      PTT - ( 03 May 2021 07:47 )  PTT:27.6 sec      Microbiological Workup    Culture - Body Fluid with Gram Stain (collected 01 May 2021 16:43)  Source: Bile biliary drain  Gram Stain (02 May 2021 23:26):    Numerous polymorphonuclear leukocytes per low power field    No organisms seen per oil power field    Culture - Blood (collected 01 May 2021 06:49)  Source: .Blood None  Preliminary Report (02 May 2021 18:01):    No growth to date.    Culture - Blood (collected 01 May 2021 06:49)  Source: .Blood None  Preliminary Report (02 May 2021 18:01):    No growth to date.    Culture - Blood (collected 30 Apr 2021 17:37)  Source: .Blood None  Preliminary Report (01 May 2021 23:02):    No growth to date.      Current Medications  Standing Medications  ascorbic acid 500 milliGRAM(s) Oral daily  aspirin enteric coated 81 milliGRAM(s) Oral daily  atorvastatin 80 milliGRAM(s) Oral at bedtime  chlorhexidine 4% Liquid 1 Application(s) Topical <User Schedule>  dextrose 40% Gel 15 Gram(s) Oral once  dextrose 5%. 1000 milliLiter(s) (50 mL/Hr) IV Continuous <Continuous>  dextrose 5%. 1000 milliLiter(s) (100 mL/Hr) IV Continuous <Continuous>  dextrose 50% Injectable 25 Gram(s) IV Push once  dextrose 50% Injectable 12.5 Gram(s) IV Push once  dextrose 50% Injectable 25 Gram(s) IV Push once  digoxin     Tablet 125 MICROGram(s) Oral daily  DULoxetine 90 milliGRAM(s) Oral daily  glucagon  Injectable 1 milliGRAM(s) IntraMuscular once  metoprolol succinate ER 25 milliGRAM(s) Oral daily  morphine ER Tablet 15 milliGRAM(s) Oral two times a day  multivitamin/minerals 1 Tablet(s) Oral daily  nafcillin  IVPB 2 Gram(s) IV Intermittent every 4 hours  nafcillin  IVPB      pantoprazole    Tablet 40 milliGRAM(s) Oral before breakfast  polyethylene glycol 3350 17 Gram(s) Oral two times a day  prasugrel 10 milliGRAM(s) Oral daily  senna 2 Tablet(s) Oral at bedtime  spironolactone 25 milliGRAM(s) Oral daily    PRN Medications  acetaminophen   Tablet .. 650 milliGRAM(s) Oral every 6 hours PRN Temp greater or equal to 38C (100.4F), Mild Pain (1 - 3)  ALPRAZolam 0.5 milliGRAM(s) Oral every 8 hours PRN anxiety  furosemide    Tablet 40 milliGRAM(s) Oral daily PRN fluid overload  melatonin 10 milliGRAM(s) Oral at bedtime PRN Insomnia  morphine  - Injectable 6 milliGRAM(s) IV Push every 4 hours PRN Severe Pain (7 - 10)    Singles Doses Administered  (Floorstock) 1 each &lt;see task&gt; GiveOnce  (Floorstock) 1 each &lt;see task&gt; GiveOnce  (Floorstock) 1 each &lt;see task&gt; GiveOnce  (Floorstock) 1 each &lt;see task&gt; GiveOnce  (Floorstock) 1 each &lt;see task&gt; GiveOnce  (Floorstock) 1 each &lt;see task&gt; GiveOnce  (Floorstock) 1 each &lt;see task&gt; GiveOnce  (Floorstock) 1 each &lt;see task&gt; GiveOnce  (Floorstock) 1 each &lt;see task&gt; GiveOnce  (Floorstock) 1 each &lt;see task&gt; GiveOnce  cefTRIAXone   IVPB 1000 milliGRAM(s) IV Intermittent once  HYDROmorphone  Injectable 1 milliGRAM(s) IV Push Once  HYDROmorphone  Injectable 0.5 milliGRAM(s) IV Push once  insulin glargine Injectable (LANTUS) 15 Unit(s) SubCutaneous once  lactated ringers Bolus 1000 milliLiter(s) IV Bolus once  nafcillin  IVPB 2 Gram(s) IV Intermittent once  ondansetron Injectable 4 milliGRAM(s) IV Push once PRN  senna 2 Tablet(s) Oral once

## 2021-05-03 NOTE — REASON FOR VISIT
[de-identified] : Incision and drainage of cholecystocutaneous fistula [de-identified] : 4/8/2021 [de-identified] : 5

## 2021-05-04 LAB
ALBUMIN SERPL ELPH-MCNC: 2.8 G/DL — LOW (ref 3.5–5.2)
ALP SERPL-CCNC: 101 U/L — SIGNIFICANT CHANGE UP (ref 30–115)
ALT FLD-CCNC: 15 U/L — SIGNIFICANT CHANGE UP (ref 0–41)
ANION GAP SERPL CALC-SCNC: 8 MMOL/L — SIGNIFICANT CHANGE UP (ref 7–14)
AST SERPL-CCNC: 15 U/L — SIGNIFICANT CHANGE UP (ref 0–41)
BASOPHILS # BLD AUTO: 0.06 K/UL — SIGNIFICANT CHANGE UP (ref 0–0.2)
BASOPHILS NFR BLD AUTO: 0.6 % — SIGNIFICANT CHANGE UP (ref 0–1)
BILIRUB SERPL-MCNC: 0.5 MG/DL — SIGNIFICANT CHANGE UP (ref 0.2–1.2)
BUN SERPL-MCNC: 16 MG/DL — SIGNIFICANT CHANGE UP (ref 10–20)
CALCIUM SERPL-MCNC: 8.4 MG/DL — LOW (ref 8.5–10.1)
CARN ESTERS/C0 SERPL-SRTO: 0.9 RATIO — SIGNIFICANT CHANGE UP (ref 0–0.9)
CARNITINE FREE SERPL-MCNC: 41 UMOL/L — SIGNIFICANT CHANGE UP (ref 20–55)
CARNITINE SERPL-MCNC: 77 UMOL/L — HIGH (ref 27–73)
CHLORIDE SERPL-SCNC: 98 MMOL/L — SIGNIFICANT CHANGE UP (ref 98–110)
CO2 SERPL-SCNC: 26 MMOL/L — SIGNIFICANT CHANGE UP (ref 17–32)
CREAT SERPL-MCNC: 0.9 MG/DL — SIGNIFICANT CHANGE UP (ref 0.7–1.5)
CRP SERPL-MCNC: 102 MG/L — HIGH
CRP SERPL-MCNC: 107 MG/L — HIGH
EOSINOPHIL # BLD AUTO: 0.39 K/UL — SIGNIFICANT CHANGE UP (ref 0–0.7)
EOSINOPHIL NFR BLD AUTO: 3.8 % — SIGNIFICANT CHANGE UP (ref 0–8)
ERYTHROCYTE [SEDIMENTATION RATE] IN BLOOD: 106 MM/HR — HIGH (ref 0–10)
ERYTHROCYTE [SEDIMENTATION RATE] IN BLOOD: 69 MM/HR — HIGH (ref 0–10)
GLUCOSE SERPL-MCNC: 316 MG/DL — HIGH (ref 70–99)
HCT VFR BLD CALC: 28 % — LOW (ref 42–52)
HGB BLD-MCNC: 8.3 G/DL — LOW (ref 14–18)
IMM GRANULOCYTES NFR BLD AUTO: 0.5 % — HIGH (ref 0.1–0.3)
LYMPHOCYTES # BLD AUTO: 1.2 K/UL — SIGNIFICANT CHANGE UP (ref 1.2–3.4)
LYMPHOCYTES # BLD AUTO: 11.8 % — LOW (ref 20.5–51.1)
MAGNESIUM SERPL-MCNC: 2 MG/DL — SIGNIFICANT CHANGE UP (ref 1.8–2.4)
MCHC RBC-ENTMCNC: 21.2 PG — LOW (ref 27–31)
MCHC RBC-ENTMCNC: 29.6 G/DL — LOW (ref 32–37)
MCV RBC AUTO: 71.4 FL — LOW (ref 80–94)
MONOCYTES # BLD AUTO: 1.15 K/UL — HIGH (ref 0.1–0.6)
MONOCYTES NFR BLD AUTO: 11.3 % — HIGH (ref 1.7–9.3)
NEUTROPHILS # BLD AUTO: 7.3 K/UL — HIGH (ref 1.4–6.5)
NEUTROPHILS NFR BLD AUTO: 72 % — SIGNIFICANT CHANGE UP (ref 42.2–75.2)
NRBC # BLD: 0 /100 WBCS — SIGNIFICANT CHANGE UP (ref 0–0)
PLATELET # BLD AUTO: 345 K/UL — SIGNIFICANT CHANGE UP (ref 130–400)
POTASSIUM SERPL-MCNC: 4.3 MMOL/L — SIGNIFICANT CHANGE UP (ref 3.5–5)
POTASSIUM SERPL-SCNC: 4.3 MMOL/L — SIGNIFICANT CHANGE UP (ref 3.5–5)
PROT SERPL-MCNC: 6.7 G/DL — SIGNIFICANT CHANGE UP (ref 6–8)
RBC # BLD: 3.92 M/UL — LOW (ref 4.7–6.1)
RBC # FLD: 19.4 % — HIGH (ref 11.5–14.5)
SODIUM SERPL-SCNC: 132 MMOL/L — LOW (ref 135–146)
WBC # BLD: 10.15 K/UL — SIGNIFICANT CHANGE UP (ref 4.8–10.8)
WBC # FLD AUTO: 10.15 K/UL — SIGNIFICANT CHANGE UP (ref 4.8–10.8)
ZINC SERPL-MCNC: 56 UG/DL — SIGNIFICANT CHANGE UP (ref 44–115)

## 2021-05-04 PROCEDURE — 99233 SBSQ HOSP IP/OBS HIGH 50: CPT

## 2021-05-04 RX ADMIN — SPIRONOLACTONE 25 MILLIGRAM(S): 25 TABLET, FILM COATED ORAL at 05:46

## 2021-05-04 RX ADMIN — GABAPENTIN 100 MILLIGRAM(S): 400 CAPSULE ORAL at 14:33

## 2021-05-04 RX ADMIN — Medication 500 MILLIGRAM(S): at 11:03

## 2021-05-04 RX ADMIN — MORPHINE SULFATE 15 MILLIGRAM(S): 50 CAPSULE, EXTENDED RELEASE ORAL at 06:30

## 2021-05-04 RX ADMIN — NAFCILLIN 200 GRAM(S): 10 INJECTION, POWDER, FOR SOLUTION INTRAVENOUS at 01:10

## 2021-05-04 RX ADMIN — Medication 1 TABLET(S): at 11:03

## 2021-05-04 RX ADMIN — POLYETHYLENE GLYCOL 3350 17 GRAM(S): 17 POWDER, FOR SOLUTION ORAL at 05:47

## 2021-05-04 RX ADMIN — GABAPENTIN 100 MILLIGRAM(S): 400 CAPSULE ORAL at 21:45

## 2021-05-04 RX ADMIN — GABAPENTIN 100 MILLIGRAM(S): 400 CAPSULE ORAL at 05:46

## 2021-05-04 RX ADMIN — MORPHINE SULFATE 6 MILLIGRAM(S): 50 CAPSULE, EXTENDED RELEASE ORAL at 02:00

## 2021-05-04 RX ADMIN — NAFCILLIN 200 GRAM(S): 10 INJECTION, POWDER, FOR SOLUTION INTRAVENOUS at 21:45

## 2021-05-04 RX ADMIN — NAFCILLIN 200 GRAM(S): 10 INJECTION, POWDER, FOR SOLUTION INTRAVENOUS at 11:03

## 2021-05-04 RX ADMIN — DULOXETINE HYDROCHLORIDE 90 MILLIGRAM(S): 30 CAPSULE, DELAYED RELEASE ORAL at 11:03

## 2021-05-04 RX ADMIN — SENNA PLUS 2 TABLET(S): 8.6 TABLET ORAL at 21:45

## 2021-05-04 RX ADMIN — Medication 81 MILLIGRAM(S): at 11:03

## 2021-05-04 RX ADMIN — CHLORHEXIDINE GLUCONATE 1 APPLICATION(S): 213 SOLUTION TOPICAL at 05:46

## 2021-05-04 RX ADMIN — MORPHINE SULFATE 15 MILLIGRAM(S): 50 CAPSULE, EXTENDED RELEASE ORAL at 17:33

## 2021-05-04 RX ADMIN — Medication 25 MILLIGRAM(S): at 05:46

## 2021-05-04 RX ADMIN — NAFCILLIN 200 GRAM(S): 10 INJECTION, POWDER, FOR SOLUTION INTRAVENOUS at 14:33

## 2021-05-04 RX ADMIN — PRASUGREL 10 MILLIGRAM(S): 5 TABLET, FILM COATED ORAL at 11:03

## 2021-05-04 RX ADMIN — PANTOPRAZOLE SODIUM 40 MILLIGRAM(S): 20 TABLET, DELAYED RELEASE ORAL at 05:46

## 2021-05-04 RX ADMIN — MORPHINE SULFATE 6 MILLIGRAM(S): 50 CAPSULE, EXTENDED RELEASE ORAL at 01:44

## 2021-05-04 RX ADMIN — MORPHINE SULFATE 15 MILLIGRAM(S): 50 CAPSULE, EXTENDED RELEASE ORAL at 05:47

## 2021-05-04 RX ADMIN — NAFCILLIN 200 GRAM(S): 10 INJECTION, POWDER, FOR SOLUTION INTRAVENOUS at 05:46

## 2021-05-04 RX ADMIN — NAFCILLIN 200 GRAM(S): 10 INJECTION, POWDER, FOR SOLUTION INTRAVENOUS at 17:33

## 2021-05-04 RX ADMIN — ATORVASTATIN CALCIUM 80 MILLIGRAM(S): 80 TABLET, FILM COATED ORAL at 21:45

## 2021-05-04 RX ADMIN — Medication 125 MICROGRAM(S): at 05:46

## 2021-05-04 NOTE — PROGRESS NOTE ADULT - ATTENDING COMMENTS
Pt. seen/ examined  Pain controlled at rest, but reports pain w/ motion  Labs/ vitals reviewed  Significant ESR decrease overnight  Repeat ESR/ CRP to R/O lab error  MRI L knee w/ contrast to R/O posterior knee collection  Discussed with pt: he reports that pacemaker is MRI compatible  Will follow

## 2021-05-04 NOTE — PROGRESS NOTE ADULT - SUBJECTIVE AND OBJECTIVE BOX
LOIDAFLOWER KLINE  65y, Male  Allergy: ACE inhibitors (Hives)  carvedilol (Other)  enalapril (Hives)  Entresto (Other)      LOS  8d    CHIEF COMPLAINT: Septic arthritis (04 May 2021 16:22)      INTERVAL EVENTS/HPI  - No acute events overnight  - T(F): , Max: 98.5 (05-04-21 @ 13:15)  - Denies any worsening symptoms  - Tolerating medication  - leg pain is improving compared to 2 days ago   - WBC Count: 10.15 (05-04-21 @ 05:33)  WBC Count: 10.41 (05-03-21 @ 07:47)     - Creatinine, Serum: 0.9 (05-04-21 @ 05:33)  Creatinine, Serum: 0.8 (05-03-21 @ 07:47)       ROS  General: Denies rigors, nightsweats  HEENT: Denies headache, rhinorrhea, sore throat, eye pain  CV: Denies CP, palpitations  PULM: Denies wheezing, hemoptysis  GI: Denies hematemesis, hematochezia, melena  : Denies discharge, hematuria  MSK: Denies arthralgias, myalgias  SKIN: Denies rash, lesions  NEURO: Denies paresthesias, weakness  PSYCH: Denies depression, anxiety    VITALS:  T(F): 98.5, Max: 98.5 (05-04-21 @ 13:15)  HR: 69  BP: 106/55  RR: 20Vital Signs Last 24 Hrs  T(C): 36.9 (04 May 2021 13:15), Max: 36.9 (04 May 2021 13:15)  T(F): 98.5 (04 May 2021 13:15), Max: 98.5 (04 May 2021 13:15)  HR: 69 (04 May 2021 13:15) (69 - 88)  BP: 106/55 (04 May 2021 13:15) (106/55 - 122/60)  BP(mean): --  RR: 20 (04 May 2021 13:15) (18 - 20)  SpO2: --    PHYSICAL EXAM:  Gen: NAD, resting in bed  HEENT: Normocephalic, atraumatic  Neck: supple, no lymphadenopathy  CV: Regular rate & regular rhythm  Lungs: decreased BS at bases, no fremitus  Abdomen: Soft, BS present; draining fistula   Ext: Warm, well perfused; left knee dressed  Neuro: non focal, awake  Skin: no rash, no erythema  Lines: no phlebitis    FH: Non-contributory  Social Hx: Non-contributory    TESTS & MEASUREMENTS:                        8.3    10.15 )-----------( 345      ( 04 May 2021 05:33 )             28.0     05-04    132<L>  |  98  |  16  ----------------------------<  316<H>  4.3   |  26  |  0.9    Ca    8.4<L>      04 May 2021 05:33  Mg     2.0     05-04    TPro  6.7  /  Alb  2.8<L>  /  TBili  0.5  /  DBili  x   /  AST  15  /  ALT  15  /  AlkPhos  101  05-04    eGFR if Non African American: 91 mL/min/1.73M2 (05-04-21 @ 05:33)  eGFR if African American: 105 mL/min/1.73M2 (05-04-21 @ 05:33)    LIVER FUNCTIONS - ( 04 May 2021 05:33 )  Alb: 2.8 g/dL / Pro: 6.7 g/dL / ALK PHOS: 101 U/L / ALT: 15 U/L / AST: 15 U/L / GGT: x               Culture - Blood (collected 05-02-21 @ 06:11)  Source: .Blood None  Preliminary Report (05-03-21 @ 18:01):    No growth to date.    Culture - Body Fluid with Gram Stain (collected 05-01-21 @ 16:43)  Source: Bile biliary drain  Gram Stain (05-02-21 @ 23:26):    Numerous polymorphonuclear leukocytes per low power field    No organisms seen per oil power field  Preliminary Report (05-03-21 @ 18:08):    Few Pseudomonas aeruginosa    Culture - Blood (collected 05-01-21 @ 06:49)  Source: .Blood None  Preliminary Report (05-02-21 @ 18:01):    No growth to date.    Culture - Blood (collected 05-01-21 @ 06:49)  Source: .Blood None  Preliminary Report (05-02-21 @ 18:01):    No growth to date.    Culture - Blood (collected 04-30-21 @ 17:37)  Source: .Blood None  Preliminary Report (05-01-21 @ 23:02):    No growth to date.    Culture - Blood (collected 04-30-21 @ 11:15)  Source: .Blood None  Preliminary Report (05-01-21 @ 23:01):    No growth to date.    Culture - Blood (collected 04-28-21 @ 08:13)  Source: .Blood Blood  Gram Stain (04-29-21 @ 15:21):    Growth in anaerobic bottle: Gram Positive Cocci in Clusters  Final Report (04-30-21 @ 14:03):    Growth in anaerobic bottle: Staphylococcus aureus    See previous culture [id number]    Culture - Blood (collected 04-28-21 @ 08:13)  Source: .Blood Blood  Gram Stain (04-30-21 @ 02:25):    Growth in anaerobic bottle: Gram Positive Cocci in Clusters  Final Report (04-30-21 @ 20:23):    Growth in anaerobic bottle: Staphylococcus aureus    See previous culture [id number]    Culture - Blood (collected 04-27-21 @ 06:14)  Source: .Blood None  Final Report (05-02-21 @ 13:01):    No Growth Final    Culture - Surgical Swab (collected 04-26-21 @ 08:52)  Source: .Surgical Swab None  Final Report (05-01-21 @ 17:29):    Moderate Staphylococcus aureus  Organism: Staphylococcus aureus (05-01-21 @ 17:29)  Organism: Staphylococcus aureus (05-01-21 @ 17:29)      -  Ampicillin/Sulbactam: S <=8/4      -  Cefazolin: S <=4      -  Clindamycin: R <=0.25 This isolate is presumed to be clindamycin resistant based on detection of inducible resistance. Clindamycin may still be effective in some patients.      -  Erythromycin: R >4      -  Gentamicin: S <=1 Should not be used as monotherapy      -  Oxacillin: S <=0.25      -  Penicillin: R 4      -  RIF- Rifampin: S <=1 Should not be used as monotherapy      -  Tetra/Doxy: S <=1      -  Trimethoprim/Sulfamethoxazole: S <=0.5/9.5      -  Vancomycin: S 1      Method Type: YOLIE    Culture - Body Fluid with Gram Stain (collected 04-26-21 @ 01:15)  Source: .Body Fluid Synovial Fluid  Gram Stain (04-26-21 @ 11:35):    polymorphonuclear leukocytes per low power field    No organisms seen per oil power field    by cytocentrifuge  Preliminary Report (04-28-21 @ 10:08):    Numerous Staphylococcus aureus  Organism: Staphylococcus aureus (04-28-21 @ 10:05)  Organism: Staphylococcus aureus (04-28-21 @ 10:05)      -  Ampicillin/Sulbactam: S <=8/4      -  Cefazolin: S <=4      -  Clindamycin: R <=0.25 This isolate is presumed to be clindamycin resistant based on detection of inducible resistance. Clindamycin may still be effective in some patients.      -  Erythromycin: R >4      -  Gentamicin: S <=1 Should not be used as monotherapy      -  Oxacillin: S <=0.25      -  Penicillin: R 4      -  RIF- Rifampin: S <=1 Should not be used as monotherapy      -  Tetra/Doxy: S <=1      -  Trimethoprim/Sulfamethoxazole: S <=0.5/9.5      -  Vancomycin: S 2      Method Type: YOLIE    Culture - Urine (collected 04-26-21 @ 00:31)  Source: .Urine Clean Catch (Midstream)  Final Report (04-27-21 @ 10:57):    <10,000 CFU/mL Normal Urogenital Tricia    Culture - Blood (collected 04-26-21 @ 00:31)  Source: .Blood Blood  Gram Stain (04-27-21 @ 01:48):    Growth in aerobic bottle: Gram Positive Cocci in Clusters  Final Report (04-28-21 @ 16:38):    Growth in aerobic bottle: Staphylococcus aureus    ***Blood Panel PCR results on this specimen are available    approximately 3 hours after the Gram stain result.***    Gram stain, PCR, and/or culture results may not always    correspond due to difference in methodologies.    ************************************************************    This PCR assay was performed by multiplex PCR. This    Assay tests for 66 bacterial and resistance gene targets.    Please refer to the [employer] test directory    at [email or web address] for details.  Organism: Blood Culture PCR  Staphylococcus aureus (04-28-21 @ 16:38)  Organism: Staphylococcus aureus (04-28-21 @ 16:38)      -  Ampicillin/Sulbactam: S <=8/4      -  Cefazolin: S <=4      -  Clindamycin: R 0.5 This isolate is presumed to be clindamycin resistant based on detection of inducible resistance. Clindamycin may still be effective in some patients.      -  Erythromycin: R >4      -  Gentamicin: S <=1 Should not be used as monotherapy      -  Oxacillin: S <=0.25      -  Penicillin: R 4      -  RIF- Rifampin: S <=1 Should not be used as monotherapy      -  Tetra/Doxy: S <=1      -  Trimethoprim/Sulfamethoxazole: S <=0.5/9.5      -  Vancomycin: S 2      Method Type: YOLIE  Organism: Blood Culture PCR (04-28-21 @ 16:38)      -  Staphylococcus aureus: Detec Any isolate of Staphylococcus aureus from a blood culture is NOT considered a contaminant.      Method Type: PCR    Culture - Blood (collected 04-26-21 @ 00:31)  Source: .Blood Blood  Gram Stain (04-27-21 @ 12:53):    Growth in aerobic bottle: Gram Positive Cocci in Clusters    Growth in anaerobic bottle: Gram Positive Cocci in Clusters  Final Report (04-28-21 @ 16:39):    Growth in aerobic and anaerobic bottles: Staphylococcus aureus    See previous culture [id number]            INFECTIOUS DISEASES TESTING  COVID-19 PCR: NotDetec (05-02-21 @ 08:52)  COVID-19 PCR: Detected (04-29-21 @ 14:33)  COVID-19 PCR: NotDetec (04-26-21 @ 06:00)  COVID-19 PCR: NotDetec (03-12-21 @ 11:00)  COVID-19 PCR: Detected (02-05-21 @ 13:47)  COVID-19 PCR: NotDetec (10-13-20 @ 09:08)  COVID-19 PCR: NotDetec (10-03-20 @ 19:54)  COVID-19 PCR: NotDetec (09-01-20 @ 20:40)  COVID-19 PCR: NotDetec (07-30-20 @ 07:43)  COVID-19 PCR: NotDetec (07-16-20 @ 19:46)  COVID-19 PCR: NotDetec (05-17-20 @ 10:18)      INFLAMMATORY MARKERS  Sedimentation Rate, Erythrocyte: 69 mm/Hr (05-04-21 @ 05:33)  Sedimentation Rate, Erythrocyte: 106 mm/Hr (05-03-21 @ 22:24)  C-Reactive Protein, Serum: 107 mg/L (05-03-21 @ 22:24)  C-Reactive Protein, Serum: 108 mg/L (04-25-21 @ 22:15)      RADIOLOGY & ADDITIONAL TESTS:  I have personally reviewed the last available Chest xray  CXR      CT      CARDIOLOGY TESTING  12 Lead ECG:   Ventricular Rate 91 BPM    Atrial Rate 91 BPM    P-R Interval 168 ms    QRS Duration 162 ms    Q-T Interval 380 ms    QTC Calculation(Bazett) 467 ms    P Axis 51 degrees    R Axis -27 degrees    T Axis 134 degrees    Diagnosis Line Atrial-sensed ventricular-paced rhythm  Abnormal ECG    Confirmed by Ze Beebe ([id number]) on 4/28/2021 7:36:41 AM (04-27-21 @ 22:06)      MEDICATIONS  ascorbic acid 500 Oral daily  aspirin enteric coated 81 Oral daily  atorvastatin 80 Oral at bedtime  chlorhexidine 4% Liquid 1 Topical <User Schedule>  dextrose 40% Gel 15 Oral once  dextrose 5%. 1000 IV Continuous <Continuous>  dextrose 5%. 1000 IV Continuous <Continuous>  dextrose 50% Injectable 25 IV Push once  dextrose 50% Injectable 12.5 IV Push once  dextrose 50% Injectable 25 IV Push once  digoxin     Tablet 125 Oral daily  DULoxetine 90 Oral daily  gabapentin 100 Oral every 8 hours  glucagon  Injectable 1 IntraMuscular once  metoprolol succinate ER 25 Oral daily  morphine ER Tablet 15 Oral two times a day  multivitamin/minerals 1 Oral daily  nafcillin  IVPB 2 IV Intermittent every 4 hours  nafcillin  IVPB     pantoprazole    Tablet 40 Oral before breakfast  polyethylene glycol 3350 17 Oral two times a day  prasugrel 10 Oral daily  senna 2 Oral at bedtime  spironolactone 25 Oral daily      WEIGHT  Weight (kg): 81 (04-26-21 @ 17:23)  Creatinine, Serum: 0.9 mg/dL (05-04-21 @ 05:33)      ANTIBIOTICS:  nafcillin  IVPB 2 Gram(s) IV Intermittent every 4 hours  nafcillin  IVPB          All available historical records have been reviewed

## 2021-05-04 NOTE — PHYSICAL THERAPY INITIAL EVALUATION ADULT - SPECIFY REASON(S)
125 pm Attempted to see pt for PT initial evaluation however pt declined at this moment, verbalized he has his spouse visiting at bedside. Pt. expressed he is now
[id number] am PT attempted to assess pt however, pt.,declined due to intense pain on left knee. Pt. verbalized he has not ambulated for many weeks and has been to rehab with no
905 am 4th attempt/4th day to see pt for PT assessment however pt verbalized he is tremendous pain ( left LE) after sutures were removed last night.

## 2021-05-04 NOTE — PROGRESS NOTE ADULT - SUBJECTIVE AND OBJECTIVE BOX
ORTHO PROGRESS NOTE     65y year old Male with left septic knee  POD#8 from left knee I&D  Drain removed yesterday  Patient seen and examined bedside  Pt says pain is improved significantly after adjustment of pain regimen  Knee immobilizer in place  Planned for JOHN PAUL this AM  Denies fevers, chills, nausea, vomting, cp, sob    MEDICATIONS  (STANDING):  ascorbic acid 500 milliGRAM(s) Oral daily  aspirin enteric coated 81 milliGRAM(s) Oral daily  atorvastatin 80 milliGRAM(s) Oral at bedtime  chlorhexidine 4% Liquid 1 Application(s) Topical <User Schedule>  dextrose 40% Gel 15 Gram(s) Oral once  dextrose 5%. 1000 milliLiter(s) (50 mL/Hr) IV Continuous <Continuous>  dextrose 5%. 1000 milliLiter(s) (100 mL/Hr) IV Continuous <Continuous>  dextrose 50% Injectable 25 Gram(s) IV Push once  dextrose 50% Injectable 12.5 Gram(s) IV Push once  dextrose 50% Injectable 25 Gram(s) IV Push once  digoxin     Tablet 125 MICROGram(s) Oral daily  DULoxetine 90 milliGRAM(s) Oral daily  gabapentin 100 milliGRAM(s) Oral every 8 hours  glucagon  Injectable 1 milliGRAM(s) IntraMuscular once  metoprolol succinate ER 25 milliGRAM(s) Oral daily  morphine ER Tablet 15 milliGRAM(s) Oral two times a day  multivitamin/minerals 1 Tablet(s) Oral daily  nafcillin  IVPB 2 Gram(s) IV Intermittent every 4 hours  nafcillin  IVPB      pantoprazole    Tablet 40 milliGRAM(s) Oral before breakfast  polyethylene glycol 3350 17 Gram(s) Oral two times a day  prasugrel 10 milliGRAM(s) Oral daily  senna 2 Tablet(s) Oral at bedtime  spironolactone 25 milliGRAM(s) Oral daily    MEDICATIONS  (PRN):  acetaminophen   Tablet .. 650 milliGRAM(s) Oral every 6 hours PRN Temp greater or equal to 38C (100.4F), Mild Pain (1 - 3)  furosemide    Tablet 40 milliGRAM(s) Oral daily PRN fluid overload  melatonin 10 milliGRAM(s) Oral at bedtime PRN Insomnia  morphine  - Injectable 6 milliGRAM(s) IV Push every 4 hours PRN Severe Pain (7 - 10)      Vital Signs Last 24 Hrs  T(C): 36.7 (04 May 2021 04:57), Max: 36.8 (03 May 2021 12:32)  T(F): 98.1 (04 May 2021 04:57), Max: 98.3 (03 May 2021 12:32)  HR: 88 (04 May 2021 04:57) (76 - 88)  BP: 122/60 (04 May 2021 04:57) (104/54 - 122/60)  BP(mean): --  RR: 18 (04 May 2021 04:57) (18 - 18)  SpO2: --    PE:   NAD  Non labored respirations  LLE  Dressing C/D/I with knee immobilizer in place  Compartments soft and compressible  Motor intact distally  SILT distally  CR<2sec  palpable pulses                               8.3    10.15 )-----------( 345      ( 04 May 2021 05:33 )             28.0     05-04    132<L>  |  98  |  16  ----------------------------<  316<H>  4.3   |  26  |  0.9    Ca    8.4<L>      04 May 2021 05:33  Mg     2.0     05-04    TPro  6.7  /  Alb  2.8<L>  /  TBili  0.5  /  DBili  x   /  AST  15  /  ALT  15  /  AlkPhos  101  05-04    PT/INR - ( 03 May 2021 07:47 )   PT: 14.90 sec;   INR: 1.30 ratio         PTT - ( 03 May 2021 07:47 )  PTT:27.6 sec      05-03-21 @ 07:01  -  05-04-21 @ 07:00  --------------------------------------------------------  IN: 0 mL / OUT: 400 mL / NET: -400 mL    ESR: 69      A/P: Patient is a 65y year old Male POD#8 from left knee I&D    WBAT on LLE  Drain removed  ESR improved (69 from 106), CRP pending  Planned for JOHN PAUL today to assess for infective endocarditis given positive blood cx  Surgery following for bilious drainage  DVT ppx: SCD, LVX  Abx: nafcillin  Pain control  Home medications: resume  Trend labs  Dispo: Pending PT recommendations

## 2021-05-04 NOTE — PROGRESS NOTE ADULT - ASSESSMENT
64yo M c PMHx poorly controlled dm2, cad/ cabg, chronic systolic HF s/p ACID, b/l knee OA, recent cholecystitis s/p percutaneous drainage complicated by fistulous tract formation, here with L knee septic arthritis 2/2 mssa on backdrop of pseudogout likely leading to mssa bacteremia, functional debility    c/w current analgesia, bowel regimen/ appreciated pain attending f/u  encourage participation with PT  f/u bcx's repeats to demonstrate clearance, last + 4/28 staph, 4/30 and 5/1 ngtd prelim  for JOHN PAUL tomorrow, I discussed with cardiology today personally. npo after midnight  c/w iv abx per id recs  I discussed biliary cx with few pseudomonas c ID attending, c/w current staph coverage as patient responding clinically and interval ct imaging of biliary area improved from prior. as long as clinically improving will not add pseudomonal coverage  repeat inflammatory markers marginally improving, ortho team following if repeat I+D needed to knee  surgical and advanced GI f/u regarding eventual biliary fistulous tract plan  high risk 2/2 debilitated state and comorbidities    Provider Hand off  Pending-  ortho following R knee, uptitrated analgesia/ obtain PT follow up, advanced GI and general surgical plan regarding biliary fistulous tract, JOHN PAUL tomorrow  Family discussion- Patient called his family member on phone immediately after our conversation  Dispo- c/w iv abx for now, plan as above, suspect we will need snf for wound care/ abx

## 2021-05-04 NOTE — PROGRESS NOTE ADULT - SUBJECTIVE AND OBJECTIVE BOX
SUBJ: Patient seen and examined. No events overnight.       MEDICATIONS  (STANDING):  ascorbic acid 500 milliGRAM(s) Oral daily  aspirin enteric coated 81 milliGRAM(s) Oral daily  atorvastatin 80 milliGRAM(s) Oral at bedtime  chlorhexidine 4% Liquid 1 Application(s) Topical <User Schedule>  dextrose 40% Gel 15 Gram(s) Oral once  dextrose 5%. 1000 milliLiter(s) (50 mL/Hr) IV Continuous <Continuous>  dextrose 5%. 1000 milliLiter(s) (100 mL/Hr) IV Continuous <Continuous>  dextrose 50% Injectable 25 Gram(s) IV Push once  dextrose 50% Injectable 12.5 Gram(s) IV Push once  dextrose 50% Injectable 25 Gram(s) IV Push once  digoxin     Tablet 125 MICROGram(s) Oral daily  DULoxetine 90 milliGRAM(s) Oral daily  gabapentin 100 milliGRAM(s) Oral every 8 hours  glucagon  Injectable 1 milliGRAM(s) IntraMuscular once  metoprolol succinate ER 25 milliGRAM(s) Oral daily  morphine ER Tablet 15 milliGRAM(s) Oral two times a day  multivitamin/minerals 1 Tablet(s) Oral daily  nafcillin  IVPB 2 Gram(s) IV Intermittent every 4 hours  nafcillin  IVPB      pantoprazole    Tablet 40 milliGRAM(s) Oral before breakfast  polyethylene glycol 3350 17 Gram(s) Oral two times a day  prasugrel 10 milliGRAM(s) Oral daily  senna 2 Tablet(s) Oral at bedtime  spironolactone 25 milliGRAM(s) Oral daily    MEDICATIONS  (PRN):  acetaminophen   Tablet .. 650 milliGRAM(s) Oral every 6 hours PRN Temp greater or equal to 38C (100.4F), Mild Pain (1 - 3)  furosemide    Tablet 40 milliGRAM(s) Oral daily PRN fluid overload  melatonin 10 milliGRAM(s) Oral at bedtime PRN Insomnia  morphine  - Injectable 6 milliGRAM(s) IV Push every 4 hours PRN Severe Pain (7 - 10)            Vital Signs Last 24 Hrs  T(C): 36.8 (04 May 2021 21:35), Max: 36.9 (04 May 2021 13:15)  T(F): 98.3 (04 May 2021 21:35), Max: 98.5 (04 May 2021 13:15)  HR: 79 (04 May 2021 21:35) (69 - 88)  BP: 118/62 (04 May 2021 21:35) (106/55 - 122/60)  BP(mean): --  RR: 18 (04 May 2021 21:35) (18 - 20)  SpO2: --     REVIEW OF SYSTEMS:  CONSTITUTIONAL: No fever  NECK: No pain or stiffness  CARDIOVASCULAR: patient denies chest pain, shortness of breath or palpitations .  Repiratory: No cough or wheezing.  NEUROLOGICAL: No focal deficits to report.  GI: no BRBPR, no N,V,diarrhea.    PSYCHIATRY: normal mood and affect  HEENT: no nasal discharge, no ecchymosis        PHYSICAL EXAM:  GENERAL: NAD  HEAD:  Atraumatic, Normocephalic  NECK: Supple, No JVD  NERVOUS SYSTEM:  Alert & Oriented X3, Good concentration  CHEST/LUNG: Clear to anterior auscultation bilaterally  HEART: Regular rate and rhythm  ABDOMEN: Soft, Nontender, Nondistended;   EXTREMITIES:  No  edema      LABS:                        8.3    10.15 )-----------( 345      ( 04 May 2021 05:33 )             28.0     05-04    132<L>  |  98  |  16  ----------------------------<  316<H>  4.3   |  26  |  0.9    Ca    8.4<L>      04 May 2021 05:33  Mg     2.0     05-04    TPro  6.7  /  Alb  2.8<L>  /  TBili  0.5  /  DBili  x   /  AST  15  /  ALT  15  /  AlkPhos  101  05-04        PT/INR - ( 03 May 2021 07:47 )   PT: 14.90 sec;   INR: 1.30 ratio         PTT - ( 03 May 2021 07:47 )  PTT:27.6 sec    I&O's Summary    03 May 2021 07:01  -  04 May 2021 07:00  --------------------------------------------------------  IN: 0 mL / OUT: 400 mL / NET: -400 mL    04 May 2021 07:01  -  04 May 2021 23:28  --------------------------------------------------------  IN: 0 mL / OUT: 460 mL / NET: -460 mL      BNP  RADIOLOGY & ADDITIONAL STUDIES:    IMPRESSION AND PLAN:    CAD and ICM   Septic Knee  MSSA bacteremia   - Management as per primary team  - Euvolemic continue to monitor I&O  - Continue ASA and Prasugrel   - JOHN PAUL tomorrow to R/O endocarditis   - Will follow

## 2021-05-04 NOTE — PROGRESS NOTE ADULT - SUBJECTIVE AND OBJECTIVE BOX
FLOWER MARISCAL  65y  Male      Patient is a 65y old  Male who presents with a chief complaint of Septic arthritis (04 May 2021 08:42)      INTERVAL HPI/OVERNIGHT EVENTS: pain was better controlled second half of yesterday but then worsened after knee dressing change/ drain removal. has remained reluctant to participate with PT due to pain but claims he would try again later today after analgesia. pending nava tomorrow      REVIEW OF SYSTEMS:  limited as above  All other review of systems negative    T(C): 36.9 (05-04-21 @ 13:15), Max: 36.9 (05-04-21 @ 13:15)  HR: 69 (05-04-21 @ 13:15) (69 - 88)  BP: 106/55 (05-04-21 @ 13:15) (106/55 - 122/60)  RR: 20 (05-04-21 @ 13:15) (18 - 20)  SpO2: --  Wt(kg): --Vital Signs Last 24 Hrs  T(C): 36.9 (04 May 2021 13:15), Max: 36.9 (04 May 2021 13:15)  T(F): 98.5 (04 May 2021 13:15), Max: 98.5 (04 May 2021 13:15)  HR: 69 (04 May 2021 13:15) (69 - 88)  BP: 106/55 (04 May 2021 13:15) (106/55 - 122/60)  BP(mean): --  RR: 20 (04 May 2021 13:15) (18 - 20)  SpO2: --      05-03-21 @ 07:01  -  05-04-21 @ 07:00  --------------------------------------------------------  IN: 0 mL / OUT: 400 mL / NET: -400 mL    05-04-21 @ 07:01  -  05-04-21 @ 16:22  --------------------------------------------------------  IN: 0 mL / OUT: 10 mL / NET: -10 mL        PHYSICAL EXAM:  GENERAL: deconditioned/ temporal wasting  PSYCH: no agitation, baseline mentation  NERVOUS SYSTEM:  Alert & Oriented X3, no new focal deficits  PULMONARY: Clear to percussion bilaterally; No rales, rhonchi, wheezing, or rubs  CARDIOVASCULAR: Regular rate and rhythm; No murmurs, rubs, or gallops  GI: Soft, Nontender, Nondistended; Bowel sounds present thin, ruq ostomy bag   EXTREMITIES:  2+ Peripheral Pulses, No clubbing, cyanosis, or edema, L knee dressing/ no drain, cdi, probable PAD ulcers cdi shins    Consultant(s) Notes Reviewed:  [x ] YES  [ ] NO    Discussed with Consultants/Other Providers [ x] YES     LABS                          8.3    10.15 )-----------( 345      ( 04 May 2021 05:33 )             28.0     05-04    132<L>  |  98  |  16  ----------------------------<  316<H>  4.3   |  26  |  0.9    Ca    8.4<L>      04 May 2021 05:33  Mg     2.0     05-04    TPro  6.7  /  Alb  2.8<L>  /  TBili  0.5  /  DBili  x   /  AST  15  /  ALT  15  /  AlkPhos  101  05-04        PT/INR - ( 03 May 2021 07:47 )   PT: 14.90 sec;   INR: 1.30 ratio         PTT - ( 03 May 2021 07:47 )  PTT:27.6 sec  Lactate Trend        CAPILLARY BLOOD GLUCOSE            RADIOLOGY & ADDITIONAL TESTS:    Imaging Personally Reviewed:  [ ] YES  [ ] NO    HEALTH ISSUES - PROBLEM Dx:  Knee pain, left  Knee pain, left

## 2021-05-04 NOTE — PROGRESS NOTE ADULT - ASSESSMENT
This isASSESSMENT  65 year old male with PMH of CAD s/p MI, PCI/CABG, CHFrEF (15-20%), has ICD, HTN, celiac disease, DM, neuropathy, chronic b/l LE ulcers presents, severe chronic pain,  hx infected R TKR with MRSA (was eventually cemented so has limited ROM R knee), and history of cholecystostomy tube placement in February 2020 for acute cholecystitis s/p removal in May 2020 with multiple presentations including persistent bilious drainage from the prior tract site as well as a RUQ abdominal abscess at the site s/p drainage who presents with left knee pain    IMPRESSION  #Septic Arthritis of left knee  - s/p arthrocentesis of left knee - consistent with baterial septic infection   - s/p OR washout 4/26 -- purulent fluid in left knee with significant tricompartmental arthritis   - Blood Cx 4/26 MSSA  - OR Cx 4/26 MSSA  - Blood Cx 4/27 NG, 4/28 growing Staph   - Blood Cx 4/30-5/1 NG    #Biliary Drainage from previous perc sudhir site  - Previous Intraabdominal 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 Decreased area of right upper quadrant subcutaneous stranding at site of prior percutaneous cholecystostomy, without evidence of abscess.  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    #CHF s/p ICD  #DM   #Abx allergy: carvedilol (Other)  enalapril (Hives)  Entresto (Other)    Creatinine, Serum: 1.0 mg/dL (04.25.21 @ 22:15)  Weight (kg): 81 (26 Apr 2021 17:23)    RECOMMENDATIONS  - nafcillin 2g q 4 hours  - no need to repeat further blood cultures  - follow-up JOHN PAUL  - if negative, will plan at least 6 weeks from culture clearance with cefazolin 2g q 8 hours  - pain control per primary    I will be away tomorrow and will be available on Thursday.   I will be unavailable by phone. Please message on Teams if with questions.  Spectra [phone]

## 2021-05-05 LAB
ALBUMIN SERPL ELPH-MCNC: 2.7 G/DL — LOW (ref 3.5–5.2)
ALP SERPL-CCNC: 95 U/L — SIGNIFICANT CHANGE UP (ref 30–115)
ALT FLD-CCNC: 14 U/L — SIGNIFICANT CHANGE UP (ref 0–41)
ANION GAP SERPL CALC-SCNC: 12 MMOL/L — SIGNIFICANT CHANGE UP (ref 7–14)
AST SERPL-CCNC: 12 U/L — SIGNIFICANT CHANGE UP (ref 0–41)
BILIRUB SERPL-MCNC: 0.5 MG/DL — SIGNIFICANT CHANGE UP (ref 0.2–1.2)
BUN SERPL-MCNC: 17 MG/DL — SIGNIFICANT CHANGE UP (ref 10–20)
CALCIUM SERPL-MCNC: 8.2 MG/DL — LOW (ref 8.5–10.1)
CHLORIDE SERPL-SCNC: 99 MMOL/L — SIGNIFICANT CHANGE UP (ref 98–110)
CO2 SERPL-SCNC: 24 MMOL/L — SIGNIFICANT CHANGE UP (ref 17–32)
CREAT SERPL-MCNC: 0.9 MG/DL — SIGNIFICANT CHANGE UP (ref 0.7–1.5)
CRP SERPL-MCNC: 116 MG/L — HIGH
CULTURE RESULTS: SIGNIFICANT CHANGE UP
CULTURE RESULTS: SIGNIFICANT CHANGE UP
ERYTHROCYTE [SEDIMENTATION RATE] IN BLOOD: 116 MM/HR — HIGH (ref 0–10)
GLUCOSE SERPL-MCNC: 346 MG/DL — HIGH (ref 70–99)
HCT VFR BLD CALC: 27.3 % — LOW (ref 42–52)
HGB BLD-MCNC: 8 G/DL — LOW (ref 14–18)
MCHC RBC-ENTMCNC: 20.6 PG — LOW (ref 27–31)
MCHC RBC-ENTMCNC: 29.3 G/DL — LOW (ref 32–37)
MCV RBC AUTO: 70.4 FL — LOW (ref 80–94)
NRBC # BLD: 0 /100 WBCS — SIGNIFICANT CHANGE UP (ref 0–0)
PLATELET # BLD AUTO: 374 K/UL — SIGNIFICANT CHANGE UP (ref 130–400)
POTASSIUM SERPL-MCNC: 4.3 MMOL/L — SIGNIFICANT CHANGE UP (ref 3.5–5)
POTASSIUM SERPL-SCNC: 4.3 MMOL/L — SIGNIFICANT CHANGE UP (ref 3.5–5)
PROT SERPL-MCNC: 6.3 G/DL — SIGNIFICANT CHANGE UP (ref 6–8)
RBC # BLD: 3.88 M/UL — LOW (ref 4.7–6.1)
RBC # FLD: 19.6 % — HIGH (ref 11.5–14.5)
SODIUM SERPL-SCNC: 135 MMOL/L — SIGNIFICANT CHANGE UP (ref 135–146)
SPECIMEN SOURCE: SIGNIFICANT CHANGE UP
SPECIMEN SOURCE: SIGNIFICANT CHANGE UP
WBC # BLD: 10.62 K/UL — SIGNIFICANT CHANGE UP (ref 4.8–10.8)
WBC # FLD AUTO: 10.62 K/UL — SIGNIFICANT CHANGE UP (ref 4.8–10.8)

## 2021-05-05 PROCEDURE — 93312 ECHO TRANSESOPHAGEAL: CPT | Mod: 26,XU

## 2021-05-05 PROCEDURE — 99233 SBSQ HOSP IP/OBS HIGH 50: CPT

## 2021-05-05 PROCEDURE — 93325 DOPPLER ECHO COLOR FLOW MAPG: CPT | Mod: 26

## 2021-05-05 PROCEDURE — 93320 DOPPLER ECHO COMPLETE: CPT | Mod: 26

## 2021-05-05 RX ORDER — ENOXAPARIN SODIUM 100 MG/ML
40 INJECTION SUBCUTANEOUS DAILY
Refills: 0 | Status: DISCONTINUED | OUTPATIENT
Start: 2021-05-06 | End: 2021-05-27

## 2021-05-05 RX ADMIN — NAFCILLIN 200 GRAM(S): 10 INJECTION, POWDER, FOR SOLUTION INTRAVENOUS at 23:45

## 2021-05-05 RX ADMIN — Medication 125 MICROGRAM(S): at 05:16

## 2021-05-05 RX ADMIN — CHLORHEXIDINE GLUCONATE 1 APPLICATION(S): 213 SOLUTION TOPICAL at 05:16

## 2021-05-05 RX ADMIN — POLYETHYLENE GLYCOL 3350 17 GRAM(S): 17 POWDER, FOR SOLUTION ORAL at 05:16

## 2021-05-05 RX ADMIN — ATORVASTATIN CALCIUM 80 MILLIGRAM(S): 80 TABLET, FILM COATED ORAL at 21:21

## 2021-05-05 RX ADMIN — POLYETHYLENE GLYCOL 3350 17 GRAM(S): 17 POWDER, FOR SOLUTION ORAL at 17:06

## 2021-05-05 RX ADMIN — GABAPENTIN 100 MILLIGRAM(S): 400 CAPSULE ORAL at 15:12

## 2021-05-05 RX ADMIN — Medication 25 MILLIGRAM(S): at 05:16

## 2021-05-05 RX ADMIN — MORPHINE SULFATE 6 MILLIGRAM(S): 50 CAPSULE, EXTENDED RELEASE ORAL at 04:10

## 2021-05-05 RX ADMIN — DULOXETINE HYDROCHLORIDE 90 MILLIGRAM(S): 30 CAPSULE, DELAYED RELEASE ORAL at 15:13

## 2021-05-05 RX ADMIN — MORPHINE SULFATE 6 MILLIGRAM(S): 50 CAPSULE, EXTENDED RELEASE ORAL at 23:45

## 2021-05-05 RX ADMIN — NAFCILLIN 200 GRAM(S): 10 INJECTION, POWDER, FOR SOLUTION INTRAVENOUS at 01:30

## 2021-05-05 RX ADMIN — SPIRONOLACTONE 25 MILLIGRAM(S): 25 TABLET, FILM COATED ORAL at 05:16

## 2021-05-05 RX ADMIN — NAFCILLIN 200 GRAM(S): 10 INJECTION, POWDER, FOR SOLUTION INTRAVENOUS at 15:12

## 2021-05-05 RX ADMIN — GABAPENTIN 100 MILLIGRAM(S): 400 CAPSULE ORAL at 05:16

## 2021-05-05 RX ADMIN — GABAPENTIN 100 MILLIGRAM(S): 400 CAPSULE ORAL at 21:21

## 2021-05-05 RX ADMIN — SENNA PLUS 2 TABLET(S): 8.6 TABLET ORAL at 21:21

## 2021-05-05 RX ADMIN — NAFCILLIN 200 GRAM(S): 10 INJECTION, POWDER, FOR SOLUTION INTRAVENOUS at 13:25

## 2021-05-05 RX ADMIN — NAFCILLIN 200 GRAM(S): 10 INJECTION, POWDER, FOR SOLUTION INTRAVENOUS at 20:00

## 2021-05-05 RX ADMIN — PANTOPRAZOLE SODIUM 40 MILLIGRAM(S): 20 TABLET, DELAYED RELEASE ORAL at 05:16

## 2021-05-05 RX ADMIN — MORPHINE SULFATE 15 MILLIGRAM(S): 50 CAPSULE, EXTENDED RELEASE ORAL at 05:16

## 2021-05-05 RX ADMIN — MORPHINE SULFATE 15 MILLIGRAM(S): 50 CAPSULE, EXTENDED RELEASE ORAL at 17:07

## 2021-05-05 RX ADMIN — NAFCILLIN 200 GRAM(S): 10 INJECTION, POWDER, FOR SOLUTION INTRAVENOUS at 05:16

## 2021-05-05 NOTE — PROGRESS NOTE ADULT - SUBJECTIVE AND OBJECTIVE BOX
ORTHO PROGRESS NOTE     65y year old Male with left septic knee  POD#9 from left knee I&D  Hemovac drains have been removed  Patient seen and examined bedside  Pt says pain is improved due to pain regimen but still reporting pain during motion   Knee immobilizer in place  Planned for JOHN PAUL this AM and left knee MRI w/wout contrast today to r/out posterior collection   Denies fevers, chills, nausea, vomiting, cp, sob    MEDICATIONS  (STANDING):  ascorbic acid 500 milliGRAM(s) Oral daily  aspirin enteric coated 81 milliGRAM(s) Oral daily  atorvastatin 80 milliGRAM(s) Oral at bedtime  chlorhexidine 4% Liquid 1 Application(s) Topical <User Schedule>  dextrose 40% Gel 15 Gram(s) Oral once  dextrose 5%. 1000 milliLiter(s) (50 mL/Hr) IV Continuous <Continuous>  dextrose 5%. 1000 milliLiter(s) (100 mL/Hr) IV Continuous <Continuous>  dextrose 50% Injectable 25 Gram(s) IV Push once  dextrose 50% Injectable 12.5 Gram(s) IV Push once  dextrose 50% Injectable 25 Gram(s) IV Push once  digoxin     Tablet 125 MICROGram(s) Oral daily  DULoxetine 90 milliGRAM(s) Oral daily  gabapentin 100 milliGRAM(s) Oral every 8 hours  glucagon  Injectable 1 milliGRAM(s) IntraMuscular once  metoprolol succinate ER 25 milliGRAM(s) Oral daily  morphine ER Tablet 15 milliGRAM(s) Oral two times a day  multivitamin/minerals 1 Tablet(s) Oral daily  nafcillin  IVPB 2 Gram(s) IV Intermittent every 4 hours  nafcillin  IVPB      pantoprazole    Tablet 40 milliGRAM(s) Oral before breakfast  polyethylene glycol 3350 17 Gram(s) Oral two times a day  prasugrel 10 milliGRAM(s) Oral daily  senna 2 Tablet(s) Oral at bedtime  spironolactone 25 milliGRAM(s) Oral daily    MEDICATIONS  (PRN):  acetaminophen   Tablet .. 650 milliGRAM(s) Oral every 6 hours PRN Temp greater or equal to 38C (100.4F), Mild Pain (1 - 3)  furosemide    Tablet 40 milliGRAM(s) Oral daily PRN fluid overload  melatonin 10 milliGRAM(s) Oral at bedtime PRN Insomnia  morphine  - Injectable 6 milliGRAM(s) IV Push every 4 hours PRN Severe Pain (7 - 10)    Vital Signs Last 24 Hrs  T(C): 36.4 (05 May 2021 05:10), Max: 36.9 (04 May 2021 13:15)  T(F): 97.6 (05 May 2021 05:10), Max: 98.5 (04 May 2021 13:15)  HR: 79 (05 May 2021 05:10) (69 - 79)  BP: 116/59 (05 May 2021 05:10) (106/55 - 118/62)  BP(mean): --  RR: 18 (05 May 2021 05:10) (18 - 20)  SpO2: --    PE:   Patient laying in bed, in NAD, unlabored breathing on RA     LLE  Dressing C/D/I with knee immobilizer in place  Compartments soft and compressible  Motor intact distally  SILT distally  CR<2sec  palpable pulses                           8.0    10.62 )-----------( 374      ( 05 May 2021 05:12 )             27.3     05-05    135  |  99  |  17  ----------------------------<  346<H>  4.3   |  24  |  0.9    Ca    8.2<L>      05 May 2021 05:12  Mg     2.0     05-04    TPro  6.3  /  Alb  2.7<L>  /  TBili  0.5  /  DBili  x   /  AST  12  /  ALT  14  /  AlkPhos  95  05-05    C-Reactive Protein, Serum: 102  Sedimentation Rate, Erythrocyte: 116 mm/Hr (05.05.21 @ 05:12)     A/P: Patient is a 65y year old Male POD#9 from left knee I&D  WBAT on LLE  Drains removed  ESR trend: 106-->69-->116  CRP from this AM pending   Left Knee MRI w/wout contrast to r/out posterior collection pending   Planned for JOHN PAUL today to assess for infective endocarditis given positive blood cx  Surgery following for bilious drainage  DVT ppx: SCD, LVX  Abx: nafcillin  Pain control  Home medications: resume  Trend labs  Dispo: Pending PT recommendations

## 2021-05-05 NOTE — PHYSICAL THERAPY INITIAL EVALUATION ADULT - MANUAL MUSCLE TESTING RESULTS, REHAB EVAL
left LE= unable to formally assess due to pain; right LE= hip=3/5; knee not assessed due to limited ROM; ankle=4/5

## 2021-05-05 NOTE — PROGRESS NOTE ADULT - SUBJECTIVE AND OBJECTIVE BOX
SUBJ: Patient seen and examined. No events overnight.       MEDICATIONS  (STANDING):  ascorbic acid 500 milliGRAM(s) Oral daily  aspirin enteric coated 81 milliGRAM(s) Oral daily  atorvastatin 80 milliGRAM(s) Oral at bedtime  chlorhexidine 4% Liquid 1 Application(s) Topical <User Schedule>  dextrose 40% Gel 15 Gram(s) Oral once  dextrose 5%. 1000 milliLiter(s) (50 mL/Hr) IV Continuous <Continuous>  dextrose 5%. 1000 milliLiter(s) (100 mL/Hr) IV Continuous <Continuous>  dextrose 50% Injectable 25 Gram(s) IV Push once  dextrose 50% Injectable 12.5 Gram(s) IV Push once  dextrose 50% Injectable 25 Gram(s) IV Push once  digoxin     Tablet 125 MICROGram(s) Oral daily  DULoxetine 90 milliGRAM(s) Oral daily  gabapentin 100 milliGRAM(s) Oral every 8 hours  glucagon  Injectable 1 milliGRAM(s) IntraMuscular once  metoprolol succinate ER 25 milliGRAM(s) Oral daily  morphine ER Tablet 15 milliGRAM(s) Oral two times a day  multivitamin/minerals 1 Tablet(s) Oral daily  nafcillin  IVPB 2 Gram(s) IV Intermittent every 4 hours  nafcillin  IVPB      pantoprazole    Tablet 40 milliGRAM(s) Oral before breakfast  polyethylene glycol 3350 17 Gram(s) Oral two times a day  prasugrel 10 milliGRAM(s) Oral daily  senna 2 Tablet(s) Oral at bedtime  spironolactone 25 milliGRAM(s) Oral daily    MEDICATIONS  (PRN):  acetaminophen   Tablet .. 650 milliGRAM(s) Oral every 6 hours PRN Temp greater or equal to 38C (100.4F), Mild Pain (1 - 3)  furosemide    Tablet 40 milliGRAM(s) Oral daily PRN fluid overload  melatonin 10 milliGRAM(s) Oral at bedtime PRN Insomnia  morphine  - Injectable 6 milliGRAM(s) IV Push every 4 hours PRN Severe Pain (7 - 10)            Vital Signs Last 24 Hrs  T(C): 36.8 (05 May 2021 12:00), Max: 36.8 (04 May 2021 21:35)  T(F): 98.3 (05 May 2021 12:00), Max: 98.3 (04 May 2021 21:35)  HR: 77 (05 May 2021 12:00) (77 - 79)  BP: 101/56 (05 May 2021 12:00) (101/56 - 118/62)  BP(mean): --  RR: 16 (05 May 2021 12:00) (16 - 18)  SpO2: --     REVIEW OF SYSTEMS:  CONSTITUTIONAL: No fever  NECK: No pain or stiffness  CARDIOVASCULAR: patient denies chest pain, or palpitations .  Repiratory: No cough or wheezing.  NEUROLOGICAL: No focal deficits to report.  GI: no BRBPR, no N,V,diarrhea.    PSYCHIATRY: normal mood and affect        PHYSICAL EXAM:  GENERAL: NAD  HEAD:  Atraumatic, Normocephalic  NECK: Supple, No JVD  NERVOUS SYSTEM:  Alert & Oriented X3, Good concentration  CHEST/LUNG: Clear to anterior auscultation bilaterally  HEART: Regular rate and rhythm  ABDOMEN: Soft, Nontender, Nondistended;       LABS:                        8.0    10.62 )-----------( 374      ( 05 May 2021 05:12 )             27.3     05-05    135  |  99  |  17  ----------------------------<  346<H>  4.3   |  24  |  0.9    Ca    8.2<L>      05 May 2021 05:12  Mg     2.0     05-04    TPro  6.3  /  Alb  2.7<L>  /  TBili  0.5  /  DBili  x   /  AST  12  /  ALT  14  /  AlkPhos  95  05-05            I&O's Summary    04 May 2021 07:01  -  05 May 2021 07:00  --------------------------------------------------------  IN: 0 mL / OUT: 910 mL / NET: -910 mL      BNP  RADIOLOGY & ADDITIONAL STUDIES:    IMPRESSION AND PLAN:    CAD and ICM   Septic Knee  MSSA bacteremia   - Management as per primary team  - Euvolemic continue to monitor I&O  - Continue ASA and Prasugrel   - JOHN PAUL showed no evidence of endocarditis   - Will follow

## 2021-05-05 NOTE — PHYSICAL THERAPY INITIAL EVALUATION ADULT - GENERAL OBSERVATIONS, REHAB EVAL
[id number] am Chart reviewed. Pt. seen semirecline in bed , in No apparent distress , + left knee immobilizer, IV meds ongoing , c/o pain on left knee, agreed to activity

## 2021-05-05 NOTE — PROGRESS NOTE ADULT - SUBJECTIVE AND OBJECTIVE BOX
Lesa Morocho MD  Hospitalist   Spectra: [phone]    Patient is a 65y old  Male who presents with a chief complaint of Septic arthritis (05 May 2021 07:23)      INTERVAL HPI/OVERNIGHT EVENTS: no acute overnight events noted   patient is npo for nava     REVIEW OF SYSTEMS: negative  Vital Signs Last 24 Hrs  T(C): 36.8 (05 May 2021 12:00), Max: 36.8 (04 May 2021 21:35)  T(F): 98.3 (05 May 2021 12:00), Max: 98.3 (04 May 2021 21:35)  HR: 77 (05 May 2021 12:00) (77 - 79)  BP: 101/56 (05 May 2021 12:00) (101/56 - 118/62)  BP(mean): --  RR: 16 (05 May 2021 12:00) (16 - 18)  SpO2: --    PHYSICAL EXAM:   NAD; Normocephalic;   LUNGS - no wheezing  HEART: S1 S2+   ABDOMEN: Soft, Nontender, non distended  EXTREMITIES: no cyanosis; no edema  NERVOUS SYSTEM:  Awake and alert; no focal neuro deficits appreciated  left leg sling in place around knee   right knee healed scar     LABS:                        8.0    10.62 )-----------( 374      ( 05 May 2021 05:12 )             27.3     05-05    135  |  99  |  17  ----------------------------<  346<H>  4.3   |  24  |  0.9    Ca    8.2<L>      05 May 2021 05:12  Mg     2.0     05-04    TPro  6.3  /  Alb  2.7<L>  /  TBili  0.5  /  DBili  x   /  AST  12  /  ALT  14  /  AlkPhos  95  05-05        CAPILLARY BLOOD GLUCOSE

## 2021-05-05 NOTE — PHYSICAL THERAPY INITIAL EVALUATION ADULT - PERTINENT HX OF CURRENT PROBLEM, REHAB EVAL
65/ M admitted for left knee pain and swelling, septic arthritis, s/p Incision  and Drainage; Hx of infected  right TKR

## 2021-05-05 NOTE — PROGRESS NOTE ADULT - ASSESSMENT
A/P:-  Pt is seen and evaluated by bedside.     1. Septic arthritis of Left knee with severe synovitis  2. 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 MSSA bacteremia   4. 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 Chronic iron deficiency anemia with component of ACD  6. DMII   7. Chronic HFrEF s/p AICD   8. CAD    - XR bilateral knees (04/26) large joint effusion in left knee  - s/p arthroscopic drainage of left knee on 04/26 and OR washout 4/26 - 20mL purulent fluid in left knee - drains removed  - Synovial Culture (04/26) MSSA  - Blood Culture (04/26) x2, 04/27 NGTD, 04/28 x2 SJ, 04/30-05/01 NGTD, 05/02 and 05/03 received  - JOHN PAUL done today - no endocarditis noted   - currently on nefcillin 2 q4 -->plan to discharge on cefazolin 2 q8 as per ID for total of 6 weeks   - needs repeat MRI of knee for posterior collection   - continue with pain control   - History of acute cholecystitis s/p cholecystostomy tube placement in February 2020 s/p removal in May 2020, with fistula formation and persistent bilious drainage from the prior tract site as well as a RUQ abdominal abscess at the site s/p drainage  - Previous Intraabdominal 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 Decreased area of right upper quadrant subcutaneous stranding at site of prior percutaneous cholecystostomy, without evidence of abscess.  - HIDA scan 05/02: cannot visualize GB  - as per advance [facility] Dr. Dixon outpatient ERCP and cholecystectomy to divert bile and heal fistula, recommended vac wound  - no further intervention as per IR and surgery   - hb level stable at this time - no active bleeding noted   - Last a1c 9.1 04/29  - on insulin pump at home - currently on hold while in hospital  - Endocrinology on board Dr Sutherland  - continue basal bolus insulin regimen   - EP on board: s/p interrogation of AICD 04/28 (normally functioning)  - Cardiology Dr Nelson is on board  - Continue Lasix PO 40mg PRN and aldactone 25mg QD   - Continue Digoxin 125mg QD  - Continue Aspirin 81mg/ Prasugrel 10mg, Rosuvastatin 40mg and toprol   - lovenox for DVT Proph- was held today for JOHN PAUL - restart after procedure     Provider Handoff:  Pending: mri of left knee   Dispo: patient is refusing SNF, minimal participation with PT     Plan of care was discussed with patient ; all questions and concerns were addressed and care was aligned with patient's wishes.

## 2021-05-05 NOTE — CHART NOTE - NSCHARTNOTEFT_GEN_A_CORE
POST OPERATIVE PROCEDURAL DOCUMENTATION  PRE-OP DIAGNOSIS: Bacteriemia rule out infective endocarditis    POST-OP DIAGNOSIS: No evidence of infective endocarditis on this study    PROCEDURE: Transesophageal echocardiogram    Primary Physician: Dr. Eng  Assistant: Tressa    ANESTHESIA TYPE  [  ] General Anesthesia  [ x ] Conscious Sedation  [  ] Local/Regional    CONDITION  [  ] Critical  [  ] Serious  [x] Fair  [  ] Good    SPECIMENS REMOVED (IF APPLICABLE): N/A    IMPLANTS (IF APPLICABLE): None    ESTIMATED BLOOD LOSS: None    COMPLICATIONS: None      FINDINGS:    After risks and benefits of procedures were explained, informed consent was obtained and placed in chart. Refer to Anesthesia note for sedation details.  The JOHN PAUL probe was passed into the esophagus without difficulty.  Transesophageal and transgastric images were obtained.  The JOHN PAUL probe was removed without difficulty and examined.  There was no evidence for bleeding.  The patient tolerated the procedure well without any immediate JOHN PAUL-related complications.      Preliminary Findings:  LA and RA: Dilated. RA and RV pacing leads seen and traced without any evidence of vegetations.  VERONICA: Left atrial appendage was clear of clot and spontaneous echo contrast.  LV: Dilated LV cavity. LVEF was estimated at 20-25%.  MV: Mild MR, No evidence for MS.   AV: No AI, no evidence for AS.   TV: Mild TR.   IAS: No PFO. NO R-> L shunt on color doppler.  There was mild, non-mobile atheroma seen in the thoracic aorta.     DIAGNOSIS/IMPRESSION:  No evidence of infective endocarditis on this study    PLAN OF CARE:  [x] Continue antibiotics as per ID and medical team.    Results of procedure/ plan of care discussed with patient/  in detail. POST OPERATIVE PROCEDURAL DOCUMENTATION  PRE-OP DIAGNOSIS: Bacteriemia rule out infective endocarditis    POST-OP DIAGNOSIS: No evidence of infective endocarditis on this study    PROCEDURE: Transesophageal echocardiogram    Primary Physician: Dr. Eng  Assistant: Tressa    ANESTHESIA TYPE  [  ] General Anesthesia  [ x ] Conscious Sedation  [  ] Local/Regional    CONDITION  [  ] Critical  [  ] Serious  [x] Fair  [  ] Good    SPECIMENS REMOVED (IF APPLICABLE): N/A    IMPLANTS (IF APPLICABLE): None    ESTIMATED BLOOD LOSS: None    COMPLICATIONS: None      FINDINGS:    After risks and benefits of procedures were explained, informed consent was obtained and placed in chart. Refer to Anesthesia note for sedation details.  The JOHN PAUL probe was passed into the esophagus without difficulty.  Transesophageal and transgastric images were obtained.  The JOHN PAUL probe was removed without difficulty and examined.  There was no evidence for bleeding.  The patient tolerated the procedure well without any immediate JOHN PAUL-related complications.      Preliminary Findings:  LA and RA: Dilated. RA and RV pacing leads seen and traced without any evidence of vegetations.  VERONICA: Left atrial appendage was clear of clot and spontaneous echo contrast.  LV: Dilated LV cavity. LVEF was estimated at 20%.  RV: Dilated RV and reduced systolic function.  MV: Mild MR, No evidence for MS.   AV: No AI, no evidence for AS.   TV: Mild TR.   IAS: No PFO. NO R-> L shunt on color doppler.  There was mild, non-mobile atheroma seen in the thoracic aorta.     DIAGNOSIS/IMPRESSION:  No evidence of infective endocarditis on this study    PLAN OF CARE:  [x] Continue antibiotics as per ID and medical team.    Results of procedure/ plan of care discussed with patient/  in detail.

## 2021-05-05 NOTE — PHYSICAL THERAPY INITIAL EVALUATION ADULT - DID THE PATIENT HAVE SURGERY?
improvement. Pt expressed being frustrated and does not want to receive PT during this admission. PT explained the need to assess him to prepare him for recovery, however, pt politely declined. Will f/u once pt agreeable ; or if pt continue to refuse PT service, then may d/c pt from acute care. Will attempt to f/u.
cont.. receptive to working with PT while admitted. Will f/u to complete eval, if time allows.
Pt. declined activity ; also scheduled for JOHN PAUL today. Pt encouraged to participate with therapy but continued to decline. Will f/u to complete eval.
s/p I and D , left knee/yes

## 2021-05-06 PROCEDURE — 99232 SBSQ HOSP IP/OBS MODERATE 35: CPT

## 2021-05-06 PROCEDURE — 73723 MRI JOINT LWR EXTR W/O&W/DYE: CPT | Mod: 26,LT

## 2021-05-06 RX ORDER — INSULIN DETEMIR 100/ML (3)
10 INSULIN PEN (ML) SUBCUTANEOUS AT BEDTIME
Refills: 0 | Status: DISCONTINUED | OUTPATIENT
Start: 2021-05-06 | End: 2021-05-06

## 2021-05-06 RX ADMIN — NAFCILLIN 200 GRAM(S): 10 INJECTION, POWDER, FOR SOLUTION INTRAVENOUS at 22:19

## 2021-05-06 RX ADMIN — Medication 25 MILLIGRAM(S): at 06:33

## 2021-05-06 RX ADMIN — POLYETHYLENE GLYCOL 3350 17 GRAM(S): 17 POWDER, FOR SOLUTION ORAL at 06:33

## 2021-05-06 RX ADMIN — NAFCILLIN 200 GRAM(S): 10 INJECTION, POWDER, FOR SOLUTION INTRAVENOUS at 06:20

## 2021-05-06 RX ADMIN — MORPHINE SULFATE 6 MILLIGRAM(S): 50 CAPSULE, EXTENDED RELEASE ORAL at 08:39

## 2021-05-06 RX ADMIN — MORPHINE SULFATE 15 MILLIGRAM(S): 50 CAPSULE, EXTENDED RELEASE ORAL at 19:20

## 2021-05-06 RX ADMIN — MORPHINE SULFATE 15 MILLIGRAM(S): 50 CAPSULE, EXTENDED RELEASE ORAL at 18:52

## 2021-05-06 RX ADMIN — PRASUGREL 10 MILLIGRAM(S): 5 TABLET, FILM COATED ORAL at 12:22

## 2021-05-06 RX ADMIN — Medication 1 TABLET(S): at 12:22

## 2021-05-06 RX ADMIN — NAFCILLIN 200 GRAM(S): 10 INJECTION, POWDER, FOR SOLUTION INTRAVENOUS at 18:54

## 2021-05-06 RX ADMIN — GABAPENTIN 100 MILLIGRAM(S): 400 CAPSULE ORAL at 21:14

## 2021-05-06 RX ADMIN — POLYETHYLENE GLYCOL 3350 17 GRAM(S): 17 POWDER, FOR SOLUTION ORAL at 18:54

## 2021-05-06 RX ADMIN — MORPHINE SULFATE 15 MILLIGRAM(S): 50 CAPSULE, EXTENDED RELEASE ORAL at 07:00

## 2021-05-06 RX ADMIN — DULOXETINE HYDROCHLORIDE 90 MILLIGRAM(S): 30 CAPSULE, DELAYED RELEASE ORAL at 12:22

## 2021-05-06 RX ADMIN — MORPHINE SULFATE 6 MILLIGRAM(S): 50 CAPSULE, EXTENDED RELEASE ORAL at 21:45

## 2021-05-06 RX ADMIN — Medication 500 MILLIGRAM(S): at 12:22

## 2021-05-06 RX ADMIN — Medication 125 MICROGRAM(S): at 06:33

## 2021-05-06 RX ADMIN — SENNA PLUS 2 TABLET(S): 8.6 TABLET ORAL at 21:14

## 2021-05-06 RX ADMIN — MORPHINE SULFATE 6 MILLIGRAM(S): 50 CAPSULE, EXTENDED RELEASE ORAL at 16:35

## 2021-05-06 RX ADMIN — ATORVASTATIN CALCIUM 80 MILLIGRAM(S): 80 TABLET, FILM COATED ORAL at 21:14

## 2021-05-06 RX ADMIN — MORPHINE SULFATE 6 MILLIGRAM(S): 50 CAPSULE, EXTENDED RELEASE ORAL at 21:14

## 2021-05-06 RX ADMIN — NAFCILLIN 200 GRAM(S): 10 INJECTION, POWDER, FOR SOLUTION INTRAVENOUS at 12:22

## 2021-05-06 RX ADMIN — MORPHINE SULFATE 15 MILLIGRAM(S): 50 CAPSULE, EXTENDED RELEASE ORAL at 06:33

## 2021-05-06 RX ADMIN — GABAPENTIN 100 MILLIGRAM(S): 400 CAPSULE ORAL at 13:50

## 2021-05-06 RX ADMIN — NAFCILLIN 200 GRAM(S): 10 INJECTION, POWDER, FOR SOLUTION INTRAVENOUS at 13:50

## 2021-05-06 RX ADMIN — GABAPENTIN 100 MILLIGRAM(S): 400 CAPSULE ORAL at 06:34

## 2021-05-06 RX ADMIN — Medication 81 MILLIGRAM(S): at 12:22

## 2021-05-06 RX ADMIN — ENOXAPARIN SODIUM 40 MILLIGRAM(S): 100 INJECTION SUBCUTANEOUS at 12:23

## 2021-05-06 RX ADMIN — NAFCILLIN 200 GRAM(S): 10 INJECTION, POWDER, FOR SOLUTION INTRAVENOUS at 01:57

## 2021-05-06 RX ADMIN — PANTOPRAZOLE SODIUM 40 MILLIGRAM(S): 20 TABLET, DELAYED RELEASE ORAL at 06:33

## 2021-05-06 RX ADMIN — SPIRONOLACTONE 25 MILLIGRAM(S): 25 TABLET, FILM COATED ORAL at 06:33

## 2021-05-06 RX ADMIN — MORPHINE SULFATE 6 MILLIGRAM(S): 50 CAPSULE, EXTENDED RELEASE ORAL at 16:20

## 2021-05-06 RX ADMIN — MORPHINE SULFATE 6 MILLIGRAM(S): 50 CAPSULE, EXTENDED RELEASE ORAL at 09:00

## 2021-05-06 NOTE — PROGRESS NOTE ADULT - SUBJECTIVE AND OBJECTIVE BOX
Patient is a 65y old  Male who presents with a chief complaint of Septic arthritis (05 May 2021 07:23)      S  He wants to have the biliary drainage bag changed. Has knee pain       REVIEW OF SYSTEMS: negative  Vital Signs Last 24 Hrs  T(C): 36.8 (05 May 2021 12:00), Max: 36.8 (04 May 2021 21:35)  T(F): 98.3 (05 May 2021 12:00), Max: 98.3 (04 May 2021 21:35)  HR: 77 (05 May 2021 12:00) (77 - 79)  BP: 101/56 (05 May 2021 12:00) (101/56 - 118/62)  BP(mean): --  RR: 16 (05 May 2021 12:00) (16 - 18)  SpO2: --    PHYSICAL EXAM:   NAD; Normocephalic;   LUNGS - no wheezing  HEART: S1 S2+   ABDOMEN: Soft, Nontender, non distended  EXTREMITIES: no cyanosis; no edema  NERVOUS SYSTEM:  Awake and alert; no focal neuro deficits appreciated  left leg sling in place around knee   right knee healed scar     LABS:                        8.0    10.62 )-----------( 374      ( 05 May 2021 05:12 )             27.3     05-05    135  |  99  |  17  ----------------------------<  346<H>  4.3   |  24  |  0.9    Ca    8.2<L>      05 May 2021 05:12  Mg     2.0     05-04    TPro  6.3  /  Alb  2.7<L>  /  TBili  0.5  /  DBili  x   /  AST  12  /  ALT  14  /  AlkPhos  95  05-05        CAPILLARY BLOOD GLUCOSE                   Patient is a 65y old  Male who presents with a chief complaint of Septic arthritis (05 May 2021 07:23)      S  He wants to have the biliary drainage bag changed. Has knee pain       REVIEW OF SYSTEMS: negative  Vital Signs Last 24 Hrs  T(C): 36.8 (05 May 2021 12:00), Max: 36.8 (04 May 2021 21:35)  T(F): 98.3 (05 May 2021 12:00), Max: 98.3 (04 May 2021 21:35)  HR: 77 (05 May 2021 12:00) (77 - 79)  BP: 101/56 (05 May 2021 12:00) (101/56 - 118/62)  BP(mean): --  RR: 16 (05 May 2021 12:00) (16 - 18)  SpO2: --    PHYSICAL EXAM:   NAD; Normocephalic;   LUNGS - no wheezing  HEART: S1 S2+   ABDOMEN: Soft, Nontender, non distended  EXTREMITIES: no cyanosis; no edema L knee dressed   NERVOUS SYSTEM:  Awake and alert; no focal neuro deficits appreciated      LABS:                        8.0    10.62 )-----------( 374      ( 05 May 2021 05:12 )             27.3     05-05    135  |  99  |  17  ----------------------------<  346<H>  4.3   |  24  |  0.9    Ca    8.2<L>      05 May 2021 05:12  Mg     2.0     05-04    TPro  6.3  /  Alb  2.7<L>  /  TBili  0.5  /  DBili  x   /  AST  12  /  ALT  14  /  AlkPhos  95  05-05        CAPILLARY BLOOD GLUCOSE

## 2021-05-06 NOTE — PROGRESS NOTE ADULT - ASSESSMENT
This isASSESSMENT  65 year old male with PMH of CAD s/p MI, PCI/CABG, CHFrEF (15-20%), has ICD, HTN, celiac disease, DM, neuropathy, chronic b/l LE ulcers presents, severe chronic pain,  hx infected R TKR with MRSA (was eventually cemented so has limited ROM R knee), and history of cholecystostomy tube placement in February 2020 for acute cholecystitis s/p removal in May 2020 with multiple presentations including persistent bilious drainage from the prior tract site as well as a RUQ abdominal abscess at the site s/p drainage who presents with left knee pain    IMPRESSION  #Septic Arthritis of left knee with MSSA bacteremia  - s/p arthrocentesis of left knee - consistent with baterial septic infection   - s/p OR washout 4/26 -- purulent fluid in left knee with significant tricompartmental arthritis   - Blood Cx 4/26 MSSA  - OR Cx 4/26 MSSA  - Blood Cx 4/27 NG, 4/28 growing Staph   - Blood Cx 4/30-5/4 NG  - JOHN PAUL 5/5: no evidence of valvular or lead vegation; noted mild, non-mobile atheroma seen in the thoracic aorta.     #Biliary Drainage from previous perc sudhir site  - Previous Intraabdominal 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 Decreased area of right upper quadrant subcutaneous stranding at site of prior percutaneous cholecystostomy, without evidence of abscess.  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    #CHF s/p ICD  #DM   #Abx allergy: carvedilol (Other)  enalapril (Hives)  Entresto (Other)    Creatinine, Serum: 1.0 mg/dL (04.25.21 @ 22:15)  Weight (kg): 81 (26 Apr 2021 17:23)    RECOMMENDATIONS  - nafcillin 2g q 4 hours  - will follow-up MRI knee   - final course pending any further need of washout; will likely plan at least 6 weeks antibiotics  - pain control per primary    Please call or message on [employer] Teams if with any questions.  Spectra [phone]

## 2021-05-06 NOTE — PROGRESS NOTE ADULT - ATTENDING COMMENTS
Pt. seen/ examined  Labs/ vitals reviewed  Wounds C/D/I, no erythema  Awaiting MRI - planned for today  Will follow

## 2021-05-06 NOTE — PROGRESS NOTE ADULT - ASSESSMENT
A/P:-  Pt is seen and evaluated by bedside.     1. Septic arthritis of Left knee with severe synovitis  2. 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 MSSA bacteremia   4. 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 Chronic iron deficiency anemia with component of ACD  6. DMII   7. Chronic HFrEF s/p AICD   8. CAD    - XR bilateral knees (04/26) large joint effusion in left knee  - s/p arthroscopic drainage of left knee on 04/26 and OR washout 4/26 - 20mL purulent fluid in left knee - drains removed  - Synovial Culture (04/26) MSSA  - Blood Culture (04/26) x2, 04/27 NGTD, 04/28 x2 SJ, 04/30-05/01 NGTD, 05/02 and 05/03 received  - JOHN PAUL  - no endocarditis noted   - currently on nefcillin 2 q4 -->plan to discharge on cefazolin 2 q8 as per ID for total of 6 weeks   - needs repeat MRI of knee for posterior collection   - continue with pain control   - History of acute cholecystitis s/p cholecystostomy tube placement in February 2020 s/p removal in May 2020, with fistula formation and persistent bilious drainage from the prior tract site as well as a RUQ abdominal abscess at the site s/p drainage  - Previous Intraabdominal 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 Decreased area of right upper quadrant subcutaneous stranding at site of prior percutaneous cholecystostomy, without evidence of abscess.  - HIDA scan 05/02: cannot visualize GB  - as per advance [facility] Dr. Dixon outpatient ERCP and cholecystectomy to divert bile and heal fistula, recommended vac wound  - no further intervention as per IR and surgery   - hb level stable at this time - no active bleeding noted   - Last a1c 9.1 04/29  - on insulin pump at home - currently on hold while in hospital  - Endocrinology on board Dr Sutherland  - continue basal bolus insulin regimen   - EP on board: s/p interrogation of AICD 04/28 (normally functioning)  - Cardiology Dr Nelson is on board  - Continue Lasix PO 40mg PRN and aldactone 25mg QD   - Continue Digoxin 125mg QD  - Continue Aspirin 81mg/ Prasugrel 10mg, Rosuvastatin 40mg and toprol   - lovenox for DVT Proph- was held today for JOHN PAUL - restart after procedure     Provider Handoff:  Pending: mri of left knee   Dispo: patient is refusing SNF, minimal participation with PT     Plan of care was discussed with patient ; all questions and concerns were addressed and care was aligned with patient's wishes.       A/P:-  Pt is seen and evaluated by bedside.     1. Septic arthritis of Left knee with severe synovitis  2. 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 MSSA bacteremia   4. 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 Chronic iron deficiency anemia with component of ACD  6. DMII   7. Chronic HFrEF s/p AICD   8. CAD    - XR bilateral knees (04/26) large joint effusion in left knee  - s/p arthroscopic drainage of left knee on 04/26 and OR washout 4/26 - 20mL purulent fluid in left knee - drains removed  - Synovial Culture (04/26) MSSA  - Blood Culture (04/26) x2, 04/27 NGTD, 04/28 x2 SJ, 04/30-05/01 NGTD, 05/02 and 05/03 received  - JOHN PAUL  - no endocarditis noted   - currently on nefcillin 2 q4 -->plan to discharge on cefazolin 2 q8 as per ID for total of 6 weeks   - needs repeat MRI of knee for posterior collection   - continue with pain control   - History of acute cholecystitis s/p cholecystostomy tube placement in February 2020 s/p removal in May 2020, with fistula formation and persistent bilious drainage from the prior tract site as well as a RUQ abdominal abscess at the site s/p drainage  - Previous Intraabdominal 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 Decreased area of right upper quadrant subcutaneous stranding at site of prior percutaneous cholecystostomy, without evidence of abscess.  - HIDA scan 05/02: cannot visualize GB  - as per advance GI Dr. Dixon outpatient ERCP and cholecystectomy to divert bile and heal fistula, recommended vac wound  - no further intervention as per IR and surgery   - hb level stable at this time - no active bleeding noted   - Last a1c 9.1 04/29  - on insulin pump   - Endocrinology Dr Sutherland signed off     - EP on board: s/p interrogation of AICD 04/28 (normally functioning)  - Cardiology Dr Nelson is on board  - Continue Lasix PO 40mg PRN and aldactone 25mg QD   - Continue Digoxin 125mg QD  - Continue Aspirin 81mg/ Prasugrel 10mg, Rosuvastatin 40mg and toprol       Provider Handoff:  Pending: mri of left knee   Dispo: patient is refusing SNF, minimal participation with PT     Plan of care was discussed with patient ; all questions and concerns were addressed and care was aligned with patient's wishes.

## 2021-05-06 NOTE — PROGRESS NOTE ADULT - SUBJECTIVE AND OBJECTIVE BOX
ORTHO PROGRESS NOTE     65y year old Male with left septic knee  POD#10 from left knee I&D  Hemovac drains have been removed  Patient seen and examined bedside  Pt says pain is improved due to pain regimen but continued pain with any motion   Knee immobilizer removed  Pending left knee MRI w/wout contrast today to r/out posterior collection   Denies fevers, chills, nausea, vomiting, cp, sob    MEDICATIONS  (STANDING):  ascorbic acid 500 milliGRAM(s) Oral daily  aspirin enteric coated 81 milliGRAM(s) Oral daily  atorvastatin 80 milliGRAM(s) Oral at bedtime  chlorhexidine 4% Liquid 1 Application(s) Topical <User Schedule>  dextrose 40% Gel 15 Gram(s) Oral once  dextrose 5%. 1000 milliLiter(s) (50 mL/Hr) IV Continuous <Continuous>  dextrose 5%. 1000 milliLiter(s) (100 mL/Hr) IV Continuous <Continuous>  dextrose 50% Injectable 25 Gram(s) IV Push once  dextrose 50% Injectable 12.5 Gram(s) IV Push once  dextrose 50% Injectable 25 Gram(s) IV Push once  digoxin     Tablet 125 MICROGram(s) Oral daily  DULoxetine 90 milliGRAM(s) Oral daily  enoxaparin Injectable 40 milliGRAM(s) SubCutaneous daily  gabapentin 100 milliGRAM(s) Oral every 8 hours  glucagon  Injectable 1 milliGRAM(s) IntraMuscular once  metoprolol succinate ER 25 milliGRAM(s) Oral daily  morphine ER Tablet 15 milliGRAM(s) Oral two times a day  multivitamin/minerals 1 Tablet(s) Oral daily  nafcillin  IVPB 2 Gram(s) IV Intermittent every 4 hours  nafcillin  IVPB      pantoprazole    Tablet 40 milliGRAM(s) Oral before breakfast  polyethylene glycol 3350 17 Gram(s) Oral two times a day  prasugrel 10 milliGRAM(s) Oral daily  senna 2 Tablet(s) Oral at bedtime  spironolactone 25 milliGRAM(s) Oral daily    MEDICATIONS  (PRN):  acetaminophen   Tablet .. 650 milliGRAM(s) Oral every 6 hours PRN Temp greater or equal to 38C (100.4F), Mild Pain (1 - 3)  furosemide    Tablet 40 milliGRAM(s) Oral daily PRN fluid overload  melatonin 10 milliGRAM(s) Oral at bedtime PRN Insomnia  morphine  - Injectable 6 milliGRAM(s) IV Push every 4 hours PRN Severe Pain (7 - 10)      Vital Signs Last 24 Hrs  T(C): 36.6 (06 May 2021 00:09), Max: 36.9 (05 May 2021 21:26)  T(F): 97.8 (06 May 2021 00:09), Max: 98.4 (05 May 2021 21:26)  HR: 77 (06 May 2021 00:09) (74 - 77)  BP: 116/58 (06 May 2021 00:09) (101/56 - 116/58)  BP(mean): --  RR: 16 (06 May 2021 00:09) (16 - 16)  SpO2: --    PE:   Patient laying in bed, in NAD, unlabored breathing on RA     LLE  Dressing C/D/I with knee immobilizer in place  Compartments soft and compressible  Motor intact distally  SILT distally  CR<2sec  palpable pulses                            8.0    10.62 )-----------( 374      ( 05 May 2021 05:12 )             27.3     05-05    135  |  99  |  17  ----------------------------<  346<H>  4.3   |  24  |  0.9    Ca    8.2<L>      05 May 2021 05:12    TPro  6.3  /  Alb  2.7<L>  /  TBili  0.5  /  DBili  x   /  AST  12  /  ALT  14  /  AlkPhos  95  05-05 05-05-21 @ 07:01  -  05-06-21 @ 07:00  --------------------------------------------------------  IN: 210 mL / OUT: 650 mL / NET: -440 mL      C-Reactive Protein, Serum: 116  Sedimentation Rate, Erythrocyte: 116 mm/Hr (05.05.21 @ 05:12)     A/P: Patient is a 65y year old Male POD#9 from left knee I&D  WBAT on LLE  Drains removed  ESR trend: 106-->69-->116   ->102 ->116  Left Knee MRI w/wout contrast to r/out posterior collection pending   JOHN PAUL complete  Surgery following for bilious drainage  DVT ppx: SCD, LVX  Abx: nafcillin  Pain control  Home medications: resume  Trend labs

## 2021-05-06 NOTE — PROGRESS NOTE ADULT - SUBJECTIVE AND OBJECTIVE BOX
LOIDAFLOWER KLINE  65y, Male  Allergy: ACE inhibitors (Hives)  carvedilol (Other)  enalapril (Hives)  Entresto (Other)      LOS  10d    CHIEF COMPLAINT: Septic arthritis (06 May 2021 10:23)      INTERVAL EVENTS/HPI  - No acute events overnight  - T(F): , Max: 98.4 (05-05-21 @ 21:26)  - pending MRI for left knee collection   - WBC Count: 10.62 (05-05-21 @ 05:12)  WBC Count: 10.15 (05-04-21 @ 05:33)     - Creatinine, Serum: 0.9 (05-05-21 @ 05:12)       ROS  General: Denies rigors, nightsweats  HEENT: Denies headache, rhinorrhea, sore throat, eye pain  CV: Denies CP, palpitations  PULM: Denies wheezing, hemoptysis  GI: Denies hematemesis, hematochezia, melena  : Denies discharge, hematuria  MSK: Denies arthralgias, myalgias  SKIN: Denies rash, lesions  NEURO: Denies paresthesias, weakness  PSYCH: Denies depression, anxiety    VITALS:  T(F): 98.2, Max: 98.4 (05-05-21 @ 21:26)  HR: 75  BP: 122/64  RR: 20Vital Signs Last 24 Hrs  T(C): 36.8 (06 May 2021 13:01), Max: 36.9 (05 May 2021 21:26)  T(F): 98.2 (06 May 2021 13:01), Max: 98.4 (05 May 2021 21:26)  HR: 75 (06 May 2021 13:01) (74 - 77)  BP: 122/64 (06 May 2021 13:01) (109/56 - 122/64)  BP(mean): --  RR: 20 (06 May 2021 13:01) (16 - 20)  SpO2: --    PHYSICAL EXAM:  Gen: NAD, resting in bed  HEENT: Normocephalic, atraumatic  Neck: supple, no lymphadenopathy  CV: Regular rate & regular rhythm  Lungs: decreased BS at bases, no fremitus  Abdomen: Soft, BS present  Ext: Warm, well perfused  Neuro: non focal, awake  Skin: no rash, no erythema  Lines: no phlebitis    FH: Non-contributory  Social Hx: Non-contributory    TESTS & MEASUREMENTS:                        8.0    10.62 )-----------( 374      ( 05 May 2021 05:12 )             27.3     05-05    135  |  99  |  17  ----------------------------<  346<H>  4.3   |  24  |  0.9    Ca    8.2<L>      05 May 2021 05:12    TPro  6.3  /  Alb  2.7<L>  /  TBili  0.5  /  DBili  x   /  AST  12  /  ALT  14  /  AlkPhos  95  05-05      LIVER FUNCTIONS - ( 05 May 2021 05:12 )  Alb: 2.7 g/dL / Pro: 6.3 g/dL / ALK PHOS: 95 U/L / ALT: 14 U/L / AST: 12 U/L / GGT: x               Culture - Blood (collected 05-04-21 @ 05:33)  Source: .Blood None  Preliminary Report (05-05-21 @ 18:01):    No growth to date.    Culture - Blood (collected 05-03-21 @ 07:47)  Source: .Blood None  Preliminary Report (05-04-21 @ 18:00):    No growth to date.    Culture - Blood (collected 05-02-21 @ 06:11)  Source: .Blood None  Preliminary Report (05-03-21 @ 18:01):    No growth to date.    Culture - Body Fluid with Gram Stain (collected 05-01-21 @ 16:43)  Source: Bile biliary drain  Gram Stain (05-02-21 @ 23:26):    Numerous polymorphonuclear leukocytes per low power field    No organisms seen per oil power field  Preliminary Report (05-05-21 @ 21:04):    Few Pseudomonas aeruginosa    Few Enterococcus faecium (vancomycin resistant)  Organism: Pseudomonas aeruginosa  Enterococcus faecium (vancomycin resistant) (05-05-21 @ 21:04)  Organism: Enterococcus faecium (vancomycin resistant) (05-05-21 @ 21:04)      -  Ampicillin: R >8 Predicts results to ampicillin/sulbactam, amoxacillin-clavulanate and  piperacillin-tazobactam.      -  Daptomycin: SDD 4 The breakpoint for SDD (sensitive dose dependent)is based on a dosage regimen of 8-12 mg/kg administered every 24 h in adults and is intended for serious infections due to E. faecium. Consultation with an infectious diseases specialist is recommended.      -  Levofloxacin: R >4      -  Linezolid: S 1      -  Tetra/Doxy: S <=1      -  Vancomycin: R >16      Method Type: YOLIE  Organism: Pseudomonas aeruginosa (05-04-21 @ 17:24)      -  Amikacin: S <=16      -  Aztreonam: S <=4      -  Cefepime: S <=2      -  Ceftazidime: S 4      -  Ciprofloxacin: S <=0.25      -  Gentamicin: S 4      -  Imipenem: S <=1      -  Levofloxacin: S <=0.5      -  Meropenem: S <=1      -  Piperacillin/Tazobactam: S <=8      -  Tobramycin: S <=2      Method Type: YOLIE    Culture - Blood (collected 05-01-21 @ 06:49)  Source: .Blood None  Preliminary Report (05-02-21 @ 18:01):    No growth to date.    Culture - Blood (collected 05-01-21 @ 06:49)  Source: .Blood None  Preliminary Report (05-02-21 @ 18:01):    No growth to date.    Culture - Blood (collected 04-30-21 @ 17:37)  Source: .Blood None  Final Report (05-05-21 @ 23:00):    No Growth Final    Culture - Blood (collected 04-30-21 @ 11:15)  Source: .Blood None  Final Report (05-05-21 @ 23:00):    No Growth Final    Culture - Blood (collected 04-28-21 @ 08:13)  Source: .Blood Blood  Gram Stain (04-29-21 @ 15:21):    Growth in anaerobic bottle: Gram Positive Cocci in Clusters  Final Report (04-30-21 @ 14:03):    Growth in anaerobic bottle: Staphylococcus aureus    See previous culture [id number]    Culture - Blood (collected 04-28-21 @ 08:13)  Source: .Blood Blood  Gram Stain (04-30-21 @ 02:25):    Growth in anaerobic bottle: Gram Positive Cocci in Clusters  Final Report (04-30-21 @ 20:23):    Growth in anaerobic bottle: Staphylococcus aureus    See previous culture [id number]    Culture - Blood (collected 04-27-21 @ 06:14)  Source: .Blood None  Final Report (05-02-21 @ 13:01):    No Growth Final    Culture - Surgical Swab (collected 04-26-21 @ 08:52)  Source: .Surgical Swab None  Final Report (05-01-21 @ 17:29):    Moderate Staphylococcus aureus  Organism: Staphylococcus aureus (05-01-21 @ 17:29)  Organism: Staphylococcus aureus (05-01-21 @ 17:29)      -  Ampicillin/Sulbactam: S <=8/4      -  Cefazolin: S <=4      -  Clindamycin: R <=0.25 This isolate is presumed to be clindamycin resistant based on detection of inducible resistance. Clindamycin may still be effective in some patients.      -  Erythromycin: R >4      -  Gentamicin: S <=1 Should not be used as monotherapy      -  Oxacillin: S <=0.25      -  Penicillin: R 4      -  RIF- Rifampin: S <=1 Should not be used as monotherapy      -  Tetra/Doxy: S <=1      -  Trimethoprim/Sulfamethoxazole: S <=0.5/9.5      -  Vancomycin: S 1      Method Type: YOLIE    Culture - Body Fluid with Gram Stain (collected 04-26-21 @ 01:15)  Source: .Body Fluid Synovial Fluid  Gram Stain (04-26-21 @ 11:35):    polymorphonuclear leukocytes per low power field    No organisms seen per oil power field    by cytocentrifuge  Preliminary Report (04-28-21 @ 10:08):    Numerous Staphylococcus aureus  Organism: Staphylococcus aureus (04-28-21 @ 10:05)  Organism: Staphylococcus aureus (04-28-21 @ 10:05)      -  Ampicillin/Sulbactam: S <=8/4      -  Cefazolin: S <=4      -  Clindamycin: R <=0.25 This isolate is presumed to be clindamycin resistant based on detection of inducible resistance. Clindamycin may still be effective in some patients.      -  Erythromycin: R >4      -  Gentamicin: S <=1 Should not be used as monotherapy      -  Oxacillin: S <=0.25      -  Penicillin: R 4      -  RIF- Rifampin: S <=1 Should not be used as monotherapy      -  Tetra/Doxy: S <=1      -  Trimethoprim/Sulfamethoxazole: S <=0.5/9.5      -  Vancomycin: S 2      Method Type: YOLIE    Culture - Urine (collected 04-26-21 @ 00:31)  Source: .Urine Clean Catch (Midstream)  Final Report (04-27-21 @ 10:57):    <10,000 CFU/mL Normal Urogenital Tricia    Culture - Blood (collected 04-26-21 @ 00:31)  Source: .Blood Blood  Gram Stain (04-27-21 @ 01:48):    Growth in aerobic bottle: Gram Positive Cocci in Clusters  Final Report (04-28-21 @ 16:38):    Growth in aerobic bottle: Staphylococcus aureus    ***Blood Panel PCR results on this specimen are available    approximately 3 hours after the Gram stain result.***    Gram stain, PCR, and/or culture results may not always    correspond due to difference in methodologies.    ************************************************************    This PCR assay was performed by multiplex PCR. This    Assay tests for 66 bacterial and resistance gene targets.    Please refer to the [facility] test directory    at [email or web address] for details.  Organism: Blood Culture PCR  Staphylococcus aureus (04-28-21 @ 16:38)  Organism: Staphylococcus aureus (04-28-21 @ 16:38)      -  Ampicillin/Sulbactam: S <=8/4      -  Cefazolin: S <=4      -  Clindamycin: R 0.5 This isolate is presumed to be clindamycin resistant based on detection of inducible resistance. Clindamycin may still be effective in some patients.      -  Erythromycin: R >4      -  Gentamicin: S <=1 Should not be used as monotherapy      -  Oxacillin: S <=0.25      -  Penicillin: R 4      -  RIF- Rifampin: S <=1 Should not be used as monotherapy      -  Tetra/Doxy: S <=1      -  Trimethoprim/Sulfamethoxazole: S <=0.5/9.5      -  Vancomycin: S 2      Method Type: YOLIE  Organism: Blood Culture PCR (04-28-21 @ 16:38)      -  Staphylococcus aureus: Detec Any isolate of Staphylococcus aureus from a blood culture is NOT considered a contaminant.      Method Type: PCR    Culture - Blood (collected 04-26-21 @ 00:31)  Source: .Blood Blood  Gram Stain (04-27-21 @ 12:53):    Growth in aerobic bottle: Gram Positive Cocci in Clusters    Growth in anaerobic bottle: Gram Positive Cocci in Clusters  Final Report (04-28-21 @ 16:39):    Growth in aerobic and anaerobic bottles: Staphylococcus aureus    See previous culture [id number]            INFECTIOUS DISEASES TESTING  COVID-19 PCR: NotDetec (05-02-21 @ 08:52)  COVID-19 PCR: Detected (04-29-21 @ 14:33)  COVID-19 PCR: NotDetec (04-26-21 @ 06:00)  COVID-19 PCR: NotDetec (03-12-21 @ 11:00)  COVID-19 PCR: Detected (02-05-21 @ 13:47)  COVID-19 PCR: NotDetec (10-13-20 @ 09:08)  COVID-19 PCR: NotDetec (10-03-20 @ 19:54)  COVID-19 PCR: NotDetec (09-01-20 @ 20:40)  COVID-19 PCR: NotDetec (07-30-20 @ 07:43)  COVID-19 PCR: NotDetec (07-16-20 @ 19:46)  COVID-19 PCR: NotDetec (05-17-20 @ 10:18)      INFLAMMATORY MARKERS  Sedimentation Rate, Erythrocyte: 116 mm/Hr (05-05-21 @ 05:12)  C-Reactive Protein, Serum: 116 mg/L (05-05-21 @ 05:12)  Sedimentation Rate, Erythrocyte: 69 mm/Hr (05-04-21 @ 05:33)  C-Reactive Protein, Serum: 102 mg/L (05-04-21 @ 05:33)      RADIOLOGY & ADDITIONAL TESTS:  I have personally reviewed the last available Chest xray  CXR      CT      CARDIOLOGY TESTING  12 Lead ECG:   Ventricular Rate 91 BPM    Atrial Rate 91 BPM    P-R Interval 168 ms    QRS Duration 162 ms    Q-T Interval 380 ms    QTC Calculation(Bazett) 467 ms    P Axis 51 degrees    R Axis -27 degrees    T Axis 134 degrees    Diagnosis Line Atrial-sensed ventricular-paced rhythm  Abnormal ECG    Confirmed by Ze Beebe ([id number]) on 4/28/2021 7:36:41 AM (04-27-21 @ 22:06)      MEDICATIONS  ascorbic acid 500 Oral daily  aspirin enteric coated 81 Oral daily  atorvastatin 80 Oral at bedtime  chlorhexidine 4% Liquid 1 Topical <User Schedule>  dextrose 40% Gel 15 Oral once  dextrose 5%. 1000 IV Continuous <Continuous>  dextrose 5%. 1000 IV Continuous <Continuous>  dextrose 50% Injectable 25 IV Push once  dextrose 50% Injectable 25 IV Push once  dextrose 50% Injectable 12.5 IV Push once  digoxin     Tablet 125 Oral daily  DULoxetine 90 Oral daily  enoxaparin Injectable 40 SubCutaneous daily  gabapentin 100 Oral every 8 hours  glucagon  Injectable 1 IntraMuscular once  metoprolol succinate ER 25 Oral daily  morphine ER Tablet 15 Oral two times a day  multivitamin/minerals 1 Oral daily  nafcillin  IVPB 2 IV Intermittent every 4 hours  nafcillin  IVPB     pantoprazole    Tablet 40 Oral before breakfast  polyethylene glycol 3350 17 Oral two times a day  prasugrel 10 Oral daily  senna 2 Oral at bedtime  spironolactone 25 Oral daily      WEIGHT  Weight (kg): 81 (05-05-21 @ 13:12)      ANTIBIOTICS:  nafcillin  IVPB 2 Gram(s) IV Intermittent every 4 hours  nafcillin  IVPB          All available historical records have been reviewed

## 2021-05-07 LAB
ANION GAP SERPL CALC-SCNC: 12 MMOL/L — SIGNIFICANT CHANGE UP (ref 7–14)
B PERT IGG+IGM PNL SER: ABNORMAL
BUN SERPL-MCNC: 16 MG/DL — SIGNIFICANT CHANGE UP (ref 10–20)
CALCIUM SERPL-MCNC: 8.3 MG/DL — LOW (ref 8.5–10.1)
CHLORIDE SERPL-SCNC: 96 MMOL/L — LOW (ref 98–110)
CO2 SERPL-SCNC: 25 MMOL/L — SIGNIFICANT CHANGE UP (ref 17–32)
COLOR FLD: SIGNIFICANT CHANGE UP
CREAT SERPL-MCNC: 0.9 MG/DL — SIGNIFICANT CHANGE UP (ref 0.7–1.5)
CULTURE RESULTS: SIGNIFICANT CHANGE UP
FLUID INTAKE SUBSTANCE CLASS: SIGNIFICANT CHANGE UP
FLUID SEGMENTED GRANULOCYTES: SIGNIFICANT CHANGE UP %
GLUCOSE SERPL-MCNC: 316 MG/DL — HIGH (ref 70–99)
GRAM STN FLD: SIGNIFICANT CHANGE UP
LYMPHOCYTES # FLD: SIGNIFICANT CHANGE UP
MONOS+MACROS # FLD: SIGNIFICANT CHANGE UP %
POTASSIUM SERPL-MCNC: 4.5 MMOL/L — SIGNIFICANT CHANGE UP (ref 3.5–5)
POTASSIUM SERPL-SCNC: 4.5 MMOL/L — SIGNIFICANT CHANGE UP (ref 3.5–5)
RCV VOL RI: HIGH /UL (ref 0–0)
SODIUM SERPL-SCNC: 133 MMOL/L — LOW (ref 135–146)
SPECIMEN SOURCE: SIGNIFICANT CHANGE UP
TOTAL NUCLEATED CELL COUNT, BODY FLUID: SIGNIFICANT CHANGE UP /UL
TUBE TYPE: SIGNIFICANT CHANGE UP

## 2021-05-07 PROCEDURE — 99232 SBSQ HOSP IP/OBS MODERATE 35: CPT

## 2021-05-07 RX ADMIN — SENNA PLUS 2 TABLET(S): 8.6 TABLET ORAL at 21:53

## 2021-05-07 RX ADMIN — ATORVASTATIN CALCIUM 80 MILLIGRAM(S): 80 TABLET, FILM COATED ORAL at 21:53

## 2021-05-07 RX ADMIN — MORPHINE SULFATE 6 MILLIGRAM(S): 50 CAPSULE, EXTENDED RELEASE ORAL at 21:52

## 2021-05-07 RX ADMIN — NAFCILLIN 200 GRAM(S): 10 INJECTION, POWDER, FOR SOLUTION INTRAVENOUS at 01:21

## 2021-05-07 RX ADMIN — GABAPENTIN 100 MILLIGRAM(S): 400 CAPSULE ORAL at 21:53

## 2021-05-07 RX ADMIN — Medication 1 TABLET(S): at 12:06

## 2021-05-07 RX ADMIN — Medication 25 MILLIGRAM(S): at 05:18

## 2021-05-07 RX ADMIN — MORPHINE SULFATE 6 MILLIGRAM(S): 50 CAPSULE, EXTENDED RELEASE ORAL at 12:45

## 2021-05-07 RX ADMIN — NAFCILLIN 200 GRAM(S): 10 INJECTION, POWDER, FOR SOLUTION INTRAVENOUS at 12:02

## 2021-05-07 RX ADMIN — MORPHINE SULFATE 6 MILLIGRAM(S): 50 CAPSULE, EXTENDED RELEASE ORAL at 05:41

## 2021-05-07 RX ADMIN — GABAPENTIN 100 MILLIGRAM(S): 400 CAPSULE ORAL at 05:18

## 2021-05-07 RX ADMIN — MORPHINE SULFATE 6 MILLIGRAM(S): 50 CAPSULE, EXTENDED RELEASE ORAL at 12:03

## 2021-05-07 RX ADMIN — CHLORHEXIDINE GLUCONATE 1 APPLICATION(S): 213 SOLUTION TOPICAL at 05:18

## 2021-05-07 RX ADMIN — Medication 125 MICROGRAM(S): at 05:18

## 2021-05-07 RX ADMIN — ENOXAPARIN SODIUM 40 MILLIGRAM(S): 100 INJECTION SUBCUTANEOUS at 12:06

## 2021-05-07 RX ADMIN — Medication 500 MILLIGRAM(S): at 12:03

## 2021-05-07 RX ADMIN — MORPHINE SULFATE 15 MILLIGRAM(S): 50 CAPSULE, EXTENDED RELEASE ORAL at 06:14

## 2021-05-07 RX ADMIN — DULOXETINE HYDROCHLORIDE 90 MILLIGRAM(S): 30 CAPSULE, DELAYED RELEASE ORAL at 12:03

## 2021-05-07 RX ADMIN — MORPHINE SULFATE 6 MILLIGRAM(S): 50 CAPSULE, EXTENDED RELEASE ORAL at 17:23

## 2021-05-07 RX ADMIN — NAFCILLIN 200 GRAM(S): 10 INJECTION, POWDER, FOR SOLUTION INTRAVENOUS at 05:18

## 2021-05-07 RX ADMIN — MORPHINE SULFATE 6 MILLIGRAM(S): 50 CAPSULE, EXTENDED RELEASE ORAL at 05:55

## 2021-05-07 RX ADMIN — NAFCILLIN 200 GRAM(S): 10 INJECTION, POWDER, FOR SOLUTION INTRAVENOUS at 15:20

## 2021-05-07 RX ADMIN — MORPHINE SULFATE 15 MILLIGRAM(S): 50 CAPSULE, EXTENDED RELEASE ORAL at 05:18

## 2021-05-07 RX ADMIN — NAFCILLIN 200 GRAM(S): 10 INJECTION, POWDER, FOR SOLUTION INTRAVENOUS at 17:21

## 2021-05-07 RX ADMIN — Medication 81 MILLIGRAM(S): at 12:06

## 2021-05-07 RX ADMIN — MORPHINE SULFATE 6 MILLIGRAM(S): 50 CAPSULE, EXTENDED RELEASE ORAL at 22:22

## 2021-05-07 RX ADMIN — MORPHINE SULFATE 6 MILLIGRAM(S): 50 CAPSULE, EXTENDED RELEASE ORAL at 01:39

## 2021-05-07 RX ADMIN — PRASUGREL 10 MILLIGRAM(S): 5 TABLET, FILM COATED ORAL at 12:07

## 2021-05-07 RX ADMIN — MORPHINE SULFATE 6 MILLIGRAM(S): 50 CAPSULE, EXTENDED RELEASE ORAL at 01:21

## 2021-05-07 RX ADMIN — NAFCILLIN 200 GRAM(S): 10 INJECTION, POWDER, FOR SOLUTION INTRAVENOUS at 21:53

## 2021-05-07 RX ADMIN — SPIRONOLACTONE 25 MILLIGRAM(S): 25 TABLET, FILM COATED ORAL at 05:18

## 2021-05-07 RX ADMIN — MORPHINE SULFATE 15 MILLIGRAM(S): 50 CAPSULE, EXTENDED RELEASE ORAL at 17:21

## 2021-05-07 RX ADMIN — PANTOPRAZOLE SODIUM 40 MILLIGRAM(S): 20 TABLET, DELAYED RELEASE ORAL at 06:19

## 2021-05-07 RX ADMIN — GABAPENTIN 100 MILLIGRAM(S): 400 CAPSULE ORAL at 15:23

## 2021-05-07 NOTE — PROGRESS NOTE ADULT - SUBJECTIVE AND OBJECTIVE BOX
Patient is a 65y old Male who presents with a chief complaint of Septic arthritis (05 May 2021 07:23)    S  He wants to have the biliary drainage bag changed. knee pain controlled on current management     Vital Signs Last 24 Hrs  T(C): 37 (07 May 2021 13:10), Max: 37.2 (07 May 2021 05:00)  T(F): 98.6 (07 May 2021 13:10), Max: 98.9 (07 May 2021 05:00)  HR: 72 (07 May 2021 13:10) (72 - 80)  BP: 124/65 (07 May 2021 13:10) (107/54 - 124/65)  BP(mean): --  RR: 20 (07 May 2021 13:10) (20 - 20)  SpO2: 97% (06 May 2021 23:55) (97% - 97%)    PHYSICAL EXAM:  NAD; Normocephalic;   LUNGS - no wheezing  HEART: S1 S2+   ABDOMEN: Soft, Nontender, non distended  EXTREMITIES: no cyanosis; no edema L knee dressed   NERVOUS SYSTEM:  Awake and alert; no focal neuro deficits appreciated

## 2021-05-07 NOTE — PROGRESS NOTE ADULT - SUBJECTIVE AND OBJECTIVE BOX
ORTHO PROGRESS NOTE     65y year old Male with left septic knee  POD#11 from left knee I&D  Hemovac drains have been removed  Patient seen and examined bedside  Knee immobilizer removed  Pending left knee MRI w/wout contrast to r/out posterior collection   Denies fevers, chills, nausea, vomiting, cp, sob    MEDICATIONS  (STANDING):  ascorbic acid 500 milliGRAM(s) Oral daily  aspirin enteric coated 81 milliGRAM(s) Oral daily  atorvastatin 80 milliGRAM(s) Oral at bedtime  chlorhexidine 4% Liquid 1 Application(s) Topical <User Schedule>  dextrose 40% Gel 15 Gram(s) Oral once  dextrose 5%. 1000 milliLiter(s) (50 mL/Hr) IV Continuous <Continuous>  dextrose 5%. 1000 milliLiter(s) (100 mL/Hr) IV Continuous <Continuous>  dextrose 50% Injectable 25 Gram(s) IV Push once  dextrose 50% Injectable 12.5 Gram(s) IV Push once  dextrose 50% Injectable 25 Gram(s) IV Push once  digoxin     Tablet 125 MICROGram(s) Oral daily  DULoxetine 90 milliGRAM(s) Oral daily  enoxaparin Injectable 40 milliGRAM(s) SubCutaneous daily  gabapentin 100 milliGRAM(s) Oral every 8 hours  glucagon  Injectable 1 milliGRAM(s) IntraMuscular once  metoprolol succinate ER 25 milliGRAM(s) Oral daily  morphine ER Tablet 15 milliGRAM(s) Oral two times a day  multivitamin/minerals 1 Tablet(s) Oral daily  nafcillin  IVPB 2 Gram(s) IV Intermittent every 4 hours  nafcillin  IVPB      pantoprazole    Tablet 40 milliGRAM(s) Oral before breakfast  polyethylene glycol 3350 17 Gram(s) Oral two times a day  prasugrel 10 milliGRAM(s) Oral daily  senna 2 Tablet(s) Oral at bedtime  spironolactone 25 milliGRAM(s) Oral daily    MEDICATIONS  (PRN):  acetaminophen   Tablet .. 650 milliGRAM(s) Oral every 6 hours PRN Temp greater or equal to 38C (100.4F), Mild Pain (1 - 3)  furosemide    Tablet 40 milliGRAM(s) Oral daily PRN fluid overload  melatonin 10 milliGRAM(s) Oral at bedtime PRN Insomnia  morphine  - Injectable 6 milliGRAM(s) IV Push every 4 hours PRN Severe Pain (7 - 10)    Vital Signs Last 24 Hrs  T(C): 37.2 (07 May 2021 05:00), Max: 37.2 (07 May 2021 05:00)  T(F): 98.9 (07 May 2021 05:00), Max: 98.9 (07 May 2021 05:00)  HR: 80 (07 May 2021 05:00) (75 - 80)  BP: 119/66 (07 May 2021 05:00) (107/54 - 122/64)  BP(mean): --  RR: 20 (07 May 2021 05:00) (20 - 20)  SpO2: --    PE:   Patient laying in bed, in NAD, unlabored breathing on RA     LLE  Dressing C/D/I   Compartments soft and compressible  Motor intact   SILT gaines/sa/dp/sp  CR<2sec      A/P: Patient is a 65y year old Male POD#10 from left knee I&D  WBAT on LLE  Drains removed  ESR trend: 106-->69-->116   ->102 ->116  Left Knee MRI w/wout contrast to r/out posterior collection pending   JOHN PAUL complete  Surgery following for bilious drainage  DVT ppx: SCD, LVX  Abx: nafcillin  Pain control  Home medications: resume  Trend labs

## 2021-05-07 NOTE — PROGRESS NOTE ADULT - ASSESSMENT
05-07    133<L>  |  96<L>  |  16  ----------------------------<  316<H>  4.5   |  25  |  0.9    Ca    8.3<L>      07 May 2021 04:49    Culture Results:   No growth to date. (05-04 @ 05:33)  Culture Results:   No growth to date. (05-03 @ 07:47)  Culture Results:   No growth to date. (05-02 @ 06:11)  Culture Results:   Few Pseudomonas aeruginosa  Few Enterococcus faecium (vancomycin resistant) (05-01 @ 16:43)  Culture Results:   No Growth Final (05-01 @ 06:49)  Culture Results:   No Growth Final (05-01 @ 06:49)  Culture Results:   No Growth Final (04-30 @ 17:37)  Culture Results:   No Growth Final (04-30 @ 11:15)  Culture Results:   Growth in anaerobic bottle: Staphylococcus aureus  See previous culture [id number] (04-28 @ 08:13)  Culture Results:   Growth in anaerobic bottle: Staphylococcus aureus  See previous culture [id number] (04-28 @ 08:13)  Culture Results:   No Growth Final (04-27 @ 06:14)  Culture Results:   Moderate Staphylococcus aureus (04-26 @ 08:52)  Culture Results:   Numerous Staphylococcus aureus (04-26 @ 01:15)  Culture Results:   Growth in aerobic and anaerobic bottles: Staphylococcus aureus  See previous culture [id number] (04-26 @ 00:31)  Culture Results:   Growth in aerobic bottle: Staphylococcus aureus  ***Blood Panel PCR results on this specimen are available  approximately 3 hours after the Gram stain result.***  Gram stain, PCR, and/or culture results may not always  correspond due to difference in methodologies.  ************************************************************  This PCR assay was performed by multiplex PCR. This  Assay tests for 66 bacterial and resistance gene targets.  Please refer to the [facility] test directory  at [email or web address] for details. (04-26 @ 00:3  Culture Results:   <10,000 CFU/mL Normal Urogenital Tricia (04-26 @ 00:31)          Assessment and Plan:   · Assessment	  A/P:-  Pt is seen and evaluated by bedside.     1. Septic arthritis of Left knee with severe synovitis  2. 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 MSSA bacteremia   4. 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 Chronic iron deficiency anemia with component of ACD  6. DMII   7. Chronic HFrEF s/p AICD   8. CAD    - XR bilateral knees (04/26) large joint effusion in left knee  - s/p arthroscopic drainage of left knee on 04/26 and OR washout 4/26 - 20mL purulent fluid in left knee - drains removed  - Synovial Culture (04/26) MSSA  - Blood Culture (04/26) x2, 04/27 NGTD, 04/28 x2 SJ, 04/30-05/01 NGTD, 05/02 and 05/03 received  - JOHN PAUL - no endocarditis noted   - currently on nefcillin 2 q4 -->plan to discharge on cefazolin 2 q8 as per ID for total of 6 weeks   - needs repeat MRI of knee for posterior collection   - continue with pain control   - History of acute cholecystitis s/p cholecystostomy tube placement in February 2020 s/p removal in May 2020, with fistula formation and persistent bilious drainage from the prior tract site as well as a RUQ abdominal abscess at the site s/p drainage  - Previous Intraabdominal 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 Decreased area of right upper quadrant subcutaneous stranding at site of prior percutaneous cholecystostomy, without evidence of abscess.  - HIDA scan 05/02: cannot visualize GB    Provider Handoff:  Pending: mri of left knee   Dispo: patient is refusing SNF, minimal participation with PT     Plan of care was discussed with patient ; all questions and concerns were addressed and care was aligned with patient's wishes.  - as per advance GI Dr. Dixon outpatient ERCP and cholecystectomy to divert bile and heal fistula, recommended vac wound  - no further intervention as per IR and surgery   - hb level stable at this time - no active bleeding noted   - Last a1c 9.1 04/29  - on insulin pump   - Endocrinology Dr Sutherland signed off     - EP on board: s/p interrogation of AICD 04/28 (normally functioning)  - Cardiology Dr Nelson is on board  - Continue Lasix PO 40mg PRN and aldactone 25mg QD   - Continue Digoxin 125mg QD  - Continue Aspirin 81mg/ Prasugrel 10mg, Rosuvastatin 40mg and toprol  05-07    133<L>  |  96<L>  |  16  ----------------------------<  316<H>  4.5   |  25  |  0.9    Ca    8.3<L>      07 May 2021 04:49    Culture Results:   No growth to date. (05-04 @ 05:33)  Culture Results:   No growth to date. (05-03 @ 07:47)  Culture Results:   No growth to date. (05-02 @ 06:11)  Culture Results:   Few Pseudomonas aeruginosa  Few Enterococcus faecium (vancomycin resistant) (05-01 @ 16:43)  Culture Results:   No Growth Final (05-01 @ 06:49)  Culture Results:   No Growth Final (05-01 @ 06:49)  Culture Results:   No Growth Final (04-30 @ 17:37)  Culture Results:   No Growth Final (04-30 @ 11:15)  Culture Results:   Growth in anaerobic bottle: Staphylococcus aureus  See previous culture [id number] (04-28 @ 08:13)  Culture Results:   Growth in anaerobic bottle: Staphylococcus aureus  See previous culture [id number] (04-28 @ 08:13)  Culture Results:   No Growth Final (04-27 @ 06:14)  Culture Results:   Moderate Staphylococcus aureus (04-26 @ 08:52)  Culture Results:   Numerous Staphylococcus aureus (04-26 @ 01:15)  Culture Results:   Growth in aerobic and anaerobic bottles: Staphylococcus aureus  See previous culture [id number] (04-26 @ 00:31)  Culture Results:   Growth in aerobic bottle: Staphylococcus aureus  ***Blood Panel PCR results on this specimen are available  approximately 3 hours after the Gram stain result.***  Gram stain, PCR, and/or culture results may not always  correspond due to difference in methodologies.  ************************************************************  This PCR assay was performed by multiplex PCR. This  Assay tests for 66 bacterial and resistance gene targets.  Please refer to the [facility] [employer] test directory  at [email or web address] for details. (04-26 @ 00:3  Culture Results:   <10,000 CFU/mL Normal Urogenital Tricia (04-26 @ 00:31)          Assessment and Plan:   · Assessment	  A/P:  Pt is seen and evaluated at bedside with nursing staff     1. Septic arthritis of Left knee with severe synovitis  2. 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 MSSA bacteremia   4. Biliary drainage from Percutaneous Fistula site around Prior Percutaneous Cholecystostomy Tube Site  5. Chronic iron deficiency anemia with component of ACD  6. DMII   7. Chronic HFrEF s/p AICD   8. CAD    - XR bilateral knees (04/26) large joint effusion in left knee  - s/p arthroscopic drainage of left knee on 04/26 and OR washout 4/26 - 20mL purulent fluid in left knee - drains removed  - Synovial Culture (04/26) MSSA  - Blood Culture (04/26) x2, 04/27 NGTD, 04/28 x2 SJ, 04/30-05/01 NGTD, 05/02 and 05/03 received  - JOHN PAUL - no endocarditis noted   - currently on nafcillin 2 q4 -->plan to discharge on cefazolin 2 q8 as per ID for total of 6 weeks   - needs repeat MRI of knee for posterior collection   - continue with pain control   - History of acute cholecystitis s/p cholecystostomy tube placement in February 2020 s/p removal in May 2020, with fistula formation and persistent bilious drainage from the prior tract site as well as a RUQ abdominal abscess at the site s/p drainage  - Previous Intraabdominal 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 Decreased area of right upper quadrant subcutaneous stranding at site of prior percutaneous cholecystostomy, without evidence of abscess.  - HIDA scan 05/02: cannot visualize GB    Provider Handoff:  Pending: mri of left knee   Dispo: patient is refusing SNF, minimal participation with PT     Plan of care was discussed with patient ; all questions and concerns were addressed and care was aligned with patient's wishes.  - as per advance GI Dr. Dixon outpatient ERCP and cholecystectomy to divert bile and heal fistula, recommended vac wound  - no further intervention as per IR and surgery   - hb level stable at this time - no active bleeding noted   - Last a1c 9.1 04/29  - on insulin pump   - Endocrinology Dr Sutherland signed off     - EP on board: s/p interrogation of AICD 04/28 (normally functioning)  - Cardiology Dr Nelson is on board  - Continue Lasix PO 40mg PRN and aldactone 25mg QD   - Continue Digoxin 125mg QD  - Continue Aspirin 81mg/ Prasugrel 10mg, Rosuvastatin 40mg and toprol  05-07    133<L>  |  96<L>  |  16  ----------------------------<  316<H>  4.5   |  25  |  0.9    Ca    8.3<L>      07 May 2021 04:49    Culture Results:   No growth to date. (05-04 @ 05:33)  Culture Results:   No growth to date. (05-03 @ 07:47)  Culture Results:   No growth to date. (05-02 @ 06:11)  Culture Results:   Few Pseudomonas aeruginosa  Few Enterococcus faecium (vancomycin resistant) (05-01 @ 16:43)  Culture Results:   No Growth Final (05-01 @ 06:49)  Culture Results:   No Growth Final (05-01 @ 06:49)  Culture Results:   No Growth Final (04-30 @ 17:37)  Culture Results:   No Growth Final (04-30 @ 11:15)  Culture Results:   Growth in anaerobic bottle: Staphylococcus aureus  See previous culture [id number] (04-28 @ 08:13)  Culture Results:   Growth in anaerobic bottle: Staphylococcus aureus  See previous culture [id number] (04-28 @ 08:13)  Culture Results:   No Growth Final (04-27 @ 06:14)  Culture Results:   Moderate Staphylococcus aureus (04-26 @ 08:52)  Culture Results:   Numerous Staphylococcus aureus (04-26 @ 01:15)  Culture Results:   Growth in aerobic and anaerobic bottles: Staphylococcus aureus  See previous culture [id number] (04-26 @ 00:31)  Culture Results:   Growth in aerobic bottle: Staphylococcus aureus  ***Blood Panel PCR results on this specimen are available  approximately 3 hours after the Gram stain result.***  Gram stain, PCR, and/or culture results may not always  correspond due to difference in methodologies.  ************************************************************  This PCR assay was performed by multiplex PCR. This  Assay tests for 66 bacterial and resistance gene targets.  Please refer to the [facility] [employer] test directory  at [email or web address] for details. (04-26 @ 00:3  Culture Results:   <10,000 CFU/mL Normal Urogenital Tricia (04-26 @ 00:31)      Assessment and Plan:   · Assessment	  A/P:  Pt is seen and evaluated at bedside with nursing staff     1. Septic arthritis of Left knee with severe synovitis  2. 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 MSSA bacteremia   4. Biliary drainage from Percutaneous Fistula site around Prior Percutaneous Cholecystostomy Tube Site  5. Chronic iron deficiency anemia with component of ACD  6. DMII   7. Chronic HFrEF s/p AICD   8. CAD    - XR bilateral knees (04/26) large joint effusion in left knee  - s/p arthroscopic drainage of left knee on 04/26 and OR washout 4/26 - 20mL purulent fluid in left knee - drains removed  - Synovial Culture (04/26) MSSA  - Blood Culture (04/26) x2, 04/27 NGTD, 04/28 x2 SJ, 04/30-05/01 NGTD, 05/02 and 05/03 received  - JOHN PAUL - no endocarditis noted   - currently on nafcillin 2 q4 -->plan to discharge on cefazolin 2 q8 as per ID for total of 6 weeks   - continue with pain control   - History of acute cholecystitis s/p cholecystostomy tube placement in February 2020 s/p removal in May 2020, with fistula formation and persistent bilious drainage from the prior tract site as well as a RUQ abdominal abscess at the site s/p drainage  - Previous Intraabdominal 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 Decreased area of right upper quadrant subcutaneous stranding at site of prior percutaneous cholecystostomy, without evidence of abscess.  - HIDA scan 05/02: cannot visualize GB  - Repeat MRI of knee revealed septic arthritis of the knee with osteomyelitis in the distal femur and proximal tibia. Associated large joint effusion with extensive synovitis. No additional collection identified.    Provider Handoff:  Pending Picc line  Dispo: patient is refusing SNF, minimal participation with PT     Plan of care was discussed with patient ; all questions and concerns were addressed and care was aligned with patient's wishes.  - as per advance GI Dr. Andraws outpatient ERCP and cholecystectomy to divert bile and heal fistula, recommended vac wound  - no further intervention as per IR and surgery   - hb level stable at this time - no active bleeding noted   - Last a1c 9.1 04/29  - on insulin pump   - Endocrinology Dr Sutherland signed off     - EP on board: s/p interrogation of AICD 04/28 (normally functioning)  - Cardiology Dr Nelson is on board  - Continue Lasix PO 40mg PRN and aldactone 25mg QD   - Continue Digoxin 125mg QD  - Continue Aspirin 81mg/ Prasugrel 10mg, Rosuvastatin 40mg and toprol  05-07    133<L>  |  96<L>  |  16  ----------------------------<  316<H>  4.5   |  25  |  0.9    Ca    8.3<L>      07 May 2021 04:49    Culture Results:   No growth to date. (05-04 @ 05:33)  Culture Results:   No growth to date. (05-03 @ 07:47)  Culture Results:   No growth to date. (05-02 @ 06:11)  Culture Results:   Few Pseudomonas aeruginosa  Few Enterococcus faecium (vancomycin resistant) (05-01 @ 16:43)  Culture Results:   No Growth Final (05-01 @ 06:49)  Culture Results:   No Growth Final (05-01 @ 06:49)  Culture Results:   No Growth Final (04-30 @ 17:37)  Culture Results:   No Growth Final (04-30 @ 11:15)  Culture Results:   Growth in anaerobic bottle: Staphylococcus aureus  See previous culture [id number] (04-28 @ 08:13)  Culture Results:   Growth in anaerobic bottle: Staphylococcus aureus  See previous culture [id number] (04-28 @ 08:13)  Culture Results:   No Growth Final (04-27 @ 06:14)  Culture Results:   Moderate Staphylococcus aureus (04-26 @ 08:52)  Culture Results:   Numerous Staphylococcus aureus (04-26 @ 01:15)  Culture Results:   Growth in aerobic and anaerobic bottles: Staphylococcus aureus  See previous culture [id number] (04-26 @ 00:31)  Culture Results:   Growth in aerobic bottle: Staphylococcus aureus  ***Blood Panel PCR results on this specimen are available  approximately 3 hours after the Gram stain result.***  Gram stain, PCR, and/or culture results may not always  correspond due to difference in methodologies.  ************************************************************  This PCR assay was performed by multiplex PCR. This  Assay tests for 66 bacterial and resistance gene targets.  Please refer to the [facility] [employer] test directory  at [email or web address] for details. (04-26 @ 00:3  Culture Results:   <10,000 CFU/mL Normal Urogenital Tricia (04-26 @ 00:31)      Assessment and Plan:   · Assessment	  A/P:  Pt is seen and evaluated at bedside with nursing staff     1. Septic arthritis of Left knee with severe synovitis  2. 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 MSSA bacteremia   4. Biliary drainage from Percutaneous Fistula site around Prior Percutaneous Cholecystostomy Tube Site  5. Chronic iron deficiency anemia with component of ACD  6. DMII   7. Chronic HFrEF s/p AICD   8. CAD    - XR bilateral knees (04/26) large joint effusion in left knee  - s/p arthroscopic drainage of left knee on 04/26 and OR washout 4/26 - 20mL purulent fluid in left knee - drains removed  - Synovial Culture (04/26) MSSA  - Blood Culture (04/26) x2, 04/27 NGTD, 04/28 x2 SJ, 04/30-05/01 NGTD, 05/02 and 05/03 received  - JOHN PAUL - no endocarditis noted   - currently on nafcillin 2 q4 -->plan to discharge on cefazolin 2 q8 as per ID for total of 6 weeks   - continue with pain control   - History of acute cholecystitis s/p cholecystostomy tube placement in February 2020 s/p removal in May 2020, with fistula formation and persistent bilious drainage from the prior tract site as well as a RUQ abdominal abscess at the site s/p drainage  - Previous Intraabdominal 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 Decreased area of right upper quadrant subcutaneous stranding at site of prior percutaneous cholecystostomy, without evidence of abscess.  - HIDA scan 05/02: cannot visualize GB  - Repeat MRI of knee revealed septic arthritis of the knee with osteomyelitis in the distal femur and proximal tibia. Associated large joint effusion with extensive synovitis. No additional collection identified.    Provider Handoff:  Pending Picc line. As pre IR patient can not get picc line until 48 hours prior to discharge. Once discharge is anticipated please call IR back to request picc line again   Dispo: patient is refusing SNF, minimal participation with PT     Plan of care was discussed with patient ; all questions and concerns were addressed and care was aligned with patient's wishes.  - as per advance GI Dr. Dixon outpatient ERCP and cholecystectomy to divert bile and heal fistula, recommended vac wound  - no further intervention as per IR and surgery   - hb level stable at this time - no active bleeding noted   - Last a1c 9.1 04/29  - on insulin pump   - Endocrinology Dr Sutherland signed off     - EP on board: s/p interrogation of AICD 04/28 (normally functioning)  - Cardiology Dr Nelson is on board  - Continue Lasix PO 40mg PRN and aldactone 25mg QD   - Continue Digoxin 125mg QD  - Continue Aspirin 81mg/ Prasugrel 10mg, Rosuvastatin 40mg and toprol

## 2021-05-07 NOTE — PROGRESS NOTE ADULT - ATTENDING COMMENTS
Pt. seen/ examined  R knee collection aspirated  MRI reviewed  Discussed I&D vs serial aspirations  Will proceed w/ serial aspirations  Will discuss further treatment with ID service

## 2021-05-07 NOTE — PROGRESS NOTE ADULT - SUBJECTIVE AND OBJECTIVE BOX
SUBJ: Patient seen and examined. still reports sever knee pain.       MEDICATIONS  (STANDING):  ascorbic acid 500 milliGRAM(s) Oral daily  aspirin enteric coated 81 milliGRAM(s) Oral daily  atorvastatin 80 milliGRAM(s) Oral at bedtime  chlorhexidine 4% Liquid 1 Application(s) Topical <User Schedule>  dextrose 40% Gel 15 Gram(s) Oral once  dextrose 5%. 1000 milliLiter(s) (100 mL/Hr) IV Continuous <Continuous>  dextrose 5%. 1000 milliLiter(s) (50 mL/Hr) IV Continuous <Continuous>  dextrose 50% Injectable 25 Gram(s) IV Push once  dextrose 50% Injectable 12.5 Gram(s) IV Push once  dextrose 50% Injectable 25 Gram(s) IV Push once  digoxin     Tablet 125 MICROGram(s) Oral daily  DULoxetine 90 milliGRAM(s) Oral daily  enoxaparin Injectable 40 milliGRAM(s) SubCutaneous daily  gabapentin 100 milliGRAM(s) Oral every 8 hours  glucagon  Injectable 1 milliGRAM(s) IntraMuscular once  metoprolol succinate ER 25 milliGRAM(s) Oral daily  morphine ER Tablet 15 milliGRAM(s) Oral two times a day  multivitamin/minerals 1 Tablet(s) Oral daily  nafcillin  IVPB 2 Gram(s) IV Intermittent every 4 hours  nafcillin  IVPB      pantoprazole    Tablet 40 milliGRAM(s) Oral before breakfast  polyethylene glycol 3350 17 Gram(s) Oral two times a day  prasugrel 10 milliGRAM(s) Oral daily  senna 2 Tablet(s) Oral at bedtime  spironolactone 25 milliGRAM(s) Oral daily    MEDICATIONS  (PRN):  acetaminophen   Tablet .. 650 milliGRAM(s) Oral every 6 hours PRN Temp greater or equal to 38C (100.4F), Mild Pain (1 - 3)  furosemide    Tablet 40 milliGRAM(s) Oral daily PRN fluid overload  melatonin 10 milliGRAM(s) Oral at bedtime PRN Insomnia  morphine  - Injectable 6 milliGRAM(s) IV Push every 4 hours PRN Severe Pain (7 - 10)            Vital Signs Last 24 Hrs  T(C): 37.3 (07 May 2021 21:00), Max: 37.3 (07 May 2021 21:00)  T(F): 99.2 (07 May 2021 21:00), Max: 99.2 (07 May 2021 21:00)  HR: 77 (07 May 2021 21:00) (72 - 80)  BP: 115/57 (07 May 2021 21:00) (115/57 - 124/65)  BP(mean): --  RR: 18 (07 May 2021 21:00) (18 - 20)  SpO2: 97% (06 May 2021 23:55) (97% - 97%)     REVIEW OF SYSTEMS:  CONSTITUTIONAL: No fever  NECK: No pain or stiffness  CARDIOVASCULAR: patient denies chest pain, shortness of breath or palpitations .  Repiratory: No cough or wheezing.  NEUROLOGICAL: No focal deficits to report.  GI: no BRBPR, no N,V,diarrhea.    PSYCHIATRY: normal mood and affect  HEENT: no nasal discharge, no ecchymosis        PHYSICAL EXAM:  GENERAL: NAD  HEAD:  Atraumatic, Normocephalic  NECK: Supple, No JVD  NERVOUS SYSTEM:  Alert & Oriented X3, Good concentration  CHEST/LUNG: Clear to anterior auscultation bilaterally  HEART: Regular rate and rhythm  ABDOMEN: Soft, Nontender, Nondistended;     LABS:    05-07    133<L>  |  96<L>  |  16  ----------------------------<  316<H>  4.5   |  25  |  0.9    Ca    8.3<L>      07 May 2021 04:49              I&O's Summary    06 May 2021 07:01  -  07 May 2021 07:00  --------------------------------------------------------  IN: 215 mL / OUT: 1055 mL / NET: -840 mL    07 May 2021 07:01  -  07 May 2021 21:38  --------------------------------------------------------  IN: 0 mL / OUT: 0 mL / NET: 0 mL      BNP  RADIOLOGY & ADDITIONAL STUDIES:    IMPRESSION AND PLAN:  CAD and ICM   Septic Knee. MRI showed left knee effusion   MSSA bacteremia. JOHN PAUL showed no evidence of endocarditis    - Management as per primary team  - Euvolemic continue to monitor I&O  - Continue ASA and Prasugrel   - Will follow

## 2021-05-08 LAB
ALBUMIN SERPL ELPH-MCNC: 2.7 G/DL — LOW (ref 3.5–5.2)
ALP SERPL-CCNC: 82 U/L — SIGNIFICANT CHANGE UP (ref 30–115)
ALT FLD-CCNC: 12 U/L — SIGNIFICANT CHANGE UP (ref 0–41)
ANION GAP SERPL CALC-SCNC: 11 MMOL/L — SIGNIFICANT CHANGE UP (ref 7–14)
AST SERPL-CCNC: 14 U/L — SIGNIFICANT CHANGE UP (ref 0–41)
BILIRUB SERPL-MCNC: 0.5 MG/DL — SIGNIFICANT CHANGE UP (ref 0.2–1.2)
BUN SERPL-MCNC: 14 MG/DL — SIGNIFICANT CHANGE UP (ref 10–20)
CALCIUM SERPL-MCNC: 8.8 MG/DL — SIGNIFICANT CHANGE UP (ref 8.5–10.1)
CHLORIDE SERPL-SCNC: 98 MMOL/L — SIGNIFICANT CHANGE UP (ref 98–110)
CO2 SERPL-SCNC: 27 MMOL/L — SIGNIFICANT CHANGE UP (ref 17–32)
CREAT SERPL-MCNC: 0.8 MG/DL — SIGNIFICANT CHANGE UP (ref 0.7–1.5)
CULTURE RESULTS: SIGNIFICANT CHANGE UP
GLUCOSE BLDC GLUCOMTR-MCNC: 196 MG/DL — HIGH (ref 70–99)
GLUCOSE SERPL-MCNC: 113 MG/DL — HIGH (ref 70–99)
HCT VFR BLD CALC: 25.8 % — LOW (ref 42–52)
HGB BLD-MCNC: 7.7 G/DL — LOW (ref 14–18)
MCHC RBC-ENTMCNC: 21.1 PG — LOW (ref 27–31)
MCHC RBC-ENTMCNC: 29.8 G/DL — LOW (ref 32–37)
MCV RBC AUTO: 70.7 FL — LOW (ref 80–94)
NRBC # BLD: 0 /100 WBCS — SIGNIFICANT CHANGE UP (ref 0–0)
PLATELET # BLD AUTO: 453 K/UL — HIGH (ref 130–400)
POTASSIUM SERPL-MCNC: 4.8 MMOL/L — SIGNIFICANT CHANGE UP (ref 3.5–5)
POTASSIUM SERPL-SCNC: 4.8 MMOL/L — SIGNIFICANT CHANGE UP (ref 3.5–5)
PROT SERPL-MCNC: 6.8 G/DL — SIGNIFICANT CHANGE UP (ref 6–8)
RBC # BLD: 3.65 M/UL — LOW (ref 4.7–6.1)
RBC # FLD: 19.9 % — HIGH (ref 11.5–14.5)
SODIUM SERPL-SCNC: 136 MMOL/L — SIGNIFICANT CHANGE UP (ref 135–146)
SPECIMEN SOURCE: SIGNIFICANT CHANGE UP
WBC # BLD: 10.62 K/UL — SIGNIFICANT CHANGE UP (ref 4.8–10.8)
WBC # FLD AUTO: 10.62 K/UL — SIGNIFICANT CHANGE UP (ref 4.8–10.8)

## 2021-05-08 PROCEDURE — 99232 SBSQ HOSP IP/OBS MODERATE 35: CPT

## 2021-05-08 RX ORDER — MORPHINE SULFATE 50 MG/1
6 CAPSULE, EXTENDED RELEASE ORAL ONCE
Refills: 0 | Status: DISCONTINUED | OUTPATIENT
Start: 2021-05-08 | End: 2021-05-08

## 2021-05-08 RX ADMIN — GABAPENTIN 100 MILLIGRAM(S): 400 CAPSULE ORAL at 21:12

## 2021-05-08 RX ADMIN — MORPHINE SULFATE 15 MILLIGRAM(S): 50 CAPSULE, EXTENDED RELEASE ORAL at 05:08

## 2021-05-08 RX ADMIN — MORPHINE SULFATE 6 MILLIGRAM(S): 50 CAPSULE, EXTENDED RELEASE ORAL at 05:04

## 2021-05-08 RX ADMIN — DULOXETINE HYDROCHLORIDE 90 MILLIGRAM(S): 30 CAPSULE, DELAYED RELEASE ORAL at 11:21

## 2021-05-08 RX ADMIN — POLYETHYLENE GLYCOL 3350 17 GRAM(S): 17 POWDER, FOR SOLUTION ORAL at 19:01

## 2021-05-08 RX ADMIN — NAFCILLIN 200 GRAM(S): 10 INJECTION, POWDER, FOR SOLUTION INTRAVENOUS at 19:02

## 2021-05-08 RX ADMIN — Medication 125 MICROGRAM(S): at 05:05

## 2021-05-08 RX ADMIN — MORPHINE SULFATE 6 MILLIGRAM(S): 50 CAPSULE, EXTENDED RELEASE ORAL at 01:15

## 2021-05-08 RX ADMIN — NAFCILLIN 200 GRAM(S): 10 INJECTION, POWDER, FOR SOLUTION INTRAVENOUS at 09:18

## 2021-05-08 RX ADMIN — POLYETHYLENE GLYCOL 3350 17 GRAM(S): 17 POWDER, FOR SOLUTION ORAL at 05:14

## 2021-05-08 RX ADMIN — MORPHINE SULFATE 6 MILLIGRAM(S): 50 CAPSULE, EXTENDED RELEASE ORAL at 01:45

## 2021-05-08 RX ADMIN — NAFCILLIN 200 GRAM(S): 10 INJECTION, POWDER, FOR SOLUTION INTRAVENOUS at 01:15

## 2021-05-08 RX ADMIN — Medication 1 TABLET(S): at 11:21

## 2021-05-08 RX ADMIN — GABAPENTIN 100 MILLIGRAM(S): 400 CAPSULE ORAL at 13:45

## 2021-05-08 RX ADMIN — MORPHINE SULFATE 6 MILLIGRAM(S): 50 CAPSULE, EXTENDED RELEASE ORAL at 23:48

## 2021-05-08 RX ADMIN — SPIRONOLACTONE 25 MILLIGRAM(S): 25 TABLET, FILM COATED ORAL at 05:05

## 2021-05-08 RX ADMIN — NAFCILLIN 200 GRAM(S): 10 INJECTION, POWDER, FOR SOLUTION INTRAVENOUS at 05:05

## 2021-05-08 RX ADMIN — NAFCILLIN 200 GRAM(S): 10 INJECTION, POWDER, FOR SOLUTION INTRAVENOUS at 13:45

## 2021-05-08 RX ADMIN — NAFCILLIN 200 GRAM(S): 10 INJECTION, POWDER, FOR SOLUTION INTRAVENOUS at 21:11

## 2021-05-08 RX ADMIN — PANTOPRAZOLE SODIUM 40 MILLIGRAM(S): 20 TABLET, DELAYED RELEASE ORAL at 06:09

## 2021-05-08 RX ADMIN — Medication 25 MILLIGRAM(S): at 05:05

## 2021-05-08 RX ADMIN — SENNA PLUS 2 TABLET(S): 8.6 TABLET ORAL at 21:12

## 2021-05-08 RX ADMIN — ATORVASTATIN CALCIUM 80 MILLIGRAM(S): 80 TABLET, FILM COATED ORAL at 21:12

## 2021-05-08 RX ADMIN — Medication 500 MILLIGRAM(S): at 11:21

## 2021-05-08 RX ADMIN — MORPHINE SULFATE 6 MILLIGRAM(S): 50 CAPSULE, EXTENDED RELEASE ORAL at 13:52

## 2021-05-08 RX ADMIN — GABAPENTIN 100 MILLIGRAM(S): 400 CAPSULE ORAL at 05:05

## 2021-05-08 RX ADMIN — MORPHINE SULFATE 6 MILLIGRAM(S): 50 CAPSULE, EXTENDED RELEASE ORAL at 14:30

## 2021-05-08 RX ADMIN — MORPHINE SULFATE 15 MILLIGRAM(S): 50 CAPSULE, EXTENDED RELEASE ORAL at 19:01

## 2021-05-08 RX ADMIN — ENOXAPARIN SODIUM 40 MILLIGRAM(S): 100 INJECTION SUBCUTANEOUS at 11:09

## 2021-05-08 RX ADMIN — PRASUGREL 10 MILLIGRAM(S): 5 TABLET, FILM COATED ORAL at 11:21

## 2021-05-08 RX ADMIN — Medication 81 MILLIGRAM(S): at 11:21

## 2021-05-08 NOTE — PROGRESS NOTE ADULT - SUBJECTIVE AND OBJECTIVE BOX
Patient is a 65y old Male who presents with a chief complaint of Septic arthritis (05 May 2021 07:23)    S  He wants to have the biliary drainage bag changed. knee pain controlled on current management     Vital Signs Last 24 Hrs  T(C): 36.7 (08 May 2021 05:18), Max: 37.3 (07 May 2021 21:00)  T(F): 98 (08 May 2021 05:18), Max: 99.2 (07 May 2021 21:00)  HR: 75 (08 May 2021 05:18) (72 - 77)  BP: 129/76 (08 May 2021 05:18) (115/57 - 129/76)  BP(mean): --  RR: 18 (08 May 2021 05:18) (18 - 20)  SpO2: --    PHYSICAL EXAM:  NAD; Normocephalic;   LUNGS - no wheezing  HEART: S1 S2+   ABDOMEN: Soft, Nontender, non distended  EXTREMITIES: no cyanosis; no edema L knee dressed   NERVOUS SYSTEM:  Awake and alert; no focal neuro deficits appreciated     Patient is a 65y old Male who presents with a chief complaint of Septic arthritis (05 May 2021 07:23)    S  He wants to have the biliary drainage bag changed. knee pain controlled on current management     ICU Vital Signs Last 24 Hrs  T(C): 36.7 (08 May 2021 05:18), Max: 37.3 (07 May 2021 21:00)  T(F): 98 (08 May 2021 05:18), Max: 99.2 (07 May 2021 21:00)  HR: 75 (08 May 2021 05:18) (72 - 77)  BP: 129/76 (08 May 2021 05:18) (115/57 - 129/76)  BP(mean): --  ABP: --  ABP(mean): --  RR: 18 (08 May 2021 05:18) (18 - 20)  SpO2: --      PHYSICAL EXAM:  NAD; Normocephalic;   LUNGS - no wheezing  HEART: S1 S2+   ABDOMEN: Soft, Nontender, non distended  EXTREMITIES: no cyanosis; mild edema of L knee dressed. right knee swelling   NERVOUS SYSTEM:  Awake and alert; no focal neuro deficits appreciated

## 2021-05-08 NOTE — PROGRESS NOTE ADULT - SUBJECTIVE AND OBJECTIVE BOX
ORTHO PROGRESS NOTE     65y year old Male with left septic knee  POD#12 from left knee I&D    S/E  this morning, laying comfortably in bed, no complaints overnight. MRI done yesterday, reviewed, Dr. Vaughn discussed the plan with the patient who elected to pursue serial aspiration      MEDICATIONS  (STANDING):  ascorbic acid 500 milliGRAM(s) Oral daily  aspirin enteric coated 81 milliGRAM(s) Oral daily  atorvastatin 80 milliGRAM(s) Oral at bedtime  chlorhexidine 4% Liquid 1 Application(s) Topical <User Schedule>  dextrose 40% Gel 15 Gram(s) Oral once  dextrose 5%. 1000 milliLiter(s) (50 mL/Hr) IV Continuous <Continuous>  dextrose 5%. 1000 milliLiter(s) (100 mL/Hr) IV Continuous <Continuous>  dextrose 50% Injectable 25 Gram(s) IV Push once  dextrose 50% Injectable 12.5 Gram(s) IV Push once  dextrose 50% Injectable 25 Gram(s) IV Push once  digoxin     Tablet 125 MICROGram(s) Oral daily  DULoxetine 90 milliGRAM(s) Oral daily  enoxaparin Injectable 40 milliGRAM(s) SubCutaneous daily  gabapentin 100 milliGRAM(s) Oral every 8 hours  glucagon  Injectable 1 milliGRAM(s) IntraMuscular once  metoprolol succinate ER 25 milliGRAM(s) Oral daily  morphine ER Tablet 15 milliGRAM(s) Oral two times a day  multivitamin/minerals 1 Tablet(s) Oral daily  nafcillin  IVPB 2 Gram(s) IV Intermittent every 4 hours  nafcillin  IVPB      pantoprazole    Tablet 40 milliGRAM(s) Oral before breakfast  polyethylene glycol 3350 17 Gram(s) Oral two times a day  prasugrel 10 milliGRAM(s) Oral daily  senna 2 Tablet(s) Oral at bedtime  spironolactone 25 milliGRAM(s) Oral daily    MEDICATIONS  (PRN):  acetaminophen   Tablet .. 650 milliGRAM(s) Oral every 6 hours PRN Temp greater or equal to 38C (100.4F), Mild Pain (1 - 3)  furosemide    Tablet 40 milliGRAM(s) Oral daily PRN fluid overload  melatonin 10 milliGRAM(s) Oral at bedtime PRN Insomnia  morphine  - Injectable 6 milliGRAM(s) IV Push every 4 hours PRN Severe Pain (7 - 10)    Vital Signs Last 24 Hrs  T(C): 37.2 (07 May 2021 05:00), Max: 37.2 (07 May 2021 05:00)  T(F): 98.9 (07 May 2021 05:00), Max: 98.9 (07 May 2021 05:00)  HR: 80 (07 May 2021 05:00) (75 - 80)  BP: 119/66 (07 May 2021 05:00) (107/54 - 122/64)  BP(mean): --  RR: 20 (07 May 2021 05:00) (20 - 20)  SpO2: --    PE:   Patient laying in bed, in NAD, unlabored breathing on RA     LLE  Dressing C/D/I, acewrap  Compartments soft and compressible  Motor intact   SILT gaines/sa/dp/sp  CR<2sec      A/P: Patient is a 65y year old Male POD#12 from left knee I&D  Will pursue serial knee aspiration, yesterday was the first.   WBAT on LLE  ESR trend: 106-->69-->116   ->102 ->116  Surgery following for bilious drainage  DVT ppx: SCD, LVX  Abx: nafcillin  Pain control  Home medications: resume  Trend labs

## 2021-05-08 NOTE — PROGRESS NOTE ADULT - ASSESSMENT
LABS:  cret                        7.7    10.62 )-----------( 453      ( 08 May 2021 05:33 )             25.8     05-08    136  |  98  |  14  ----------------------------<  113<H>  4.8   |  27  |  0.8    Ca    8.8      08 May 2021 05:33    TPro  6.8  /  Alb  2.7<L>  /  TBili  0.5  /  DBili  x   /  AST  14  /  ALT  12  /  AlkPhos  82  05-08        Pt is seen and evaluated at bedside with nursing staff     1. Septic arthritis of Left knee with severe synovitis  2. 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 MSSA bacteremia   4. Biliary drainage from Percutaneous Fistula site around Prior Percutaneous Cholecystostomy Tube Site  5. Chronic iron deficiency anemia with component of ACD  6. DMII   7. Chronic HFrEF s/p AICD   8. CAD    - XR bilateral knees (04/26) large joint effusion in left knee  - s/p arthroscopic drainage of left knee on 04/26 and OR washout 4/26 - 20mL purulent fluid in left knee - drains removed  - Synovial Culture (04/26) MSSA  - Blood Culture (04/26) x2, 04/27 NGTD, 04/28 x2 SJ, 04/30-05/01 NGTD, 05/02 and 05/03 received  - JOHN PAUL - no endocarditis noted   - currently on nafcillin 2 q4 -->plan to discharge on cefazolin 2 q8 as per ID for total of 6 weeks   - continue with pain control   - History of acute cholecystitis s/p cholecystostomy tube placement in February 2020 s/p removal in May 2020, with fistula formation and persistent bilious drainage from the prior tract site as well as a RUQ abdominal abscess at the site s/p drainage  - Previous Intraabdominal 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 Decreased area of right upper quadrant subcutaneous stranding at site of prior percutaneous cholecystostomy, without evidence of abscess.  - HIDA scan 05/02: cannot visualize GB  - Repeat MRI of knee revealed septic arthritis of the knee with osteomyelitis in the distal femur and proximal tibia. Associated large joint effusion with extensive synovitis. No additional collection identified.    Provider Handoff:  Pending Picc line. As pre IR patient can not get picc line until 48 hours prior to discharge. Once discharge is anticipated please call IR back to request picc line again   Dispo: patient is refusing SNF, minimal participation with PT     Plan of care was discussed with patient ; all questions and concerns were addressed and care was aligned with patient's wishes.  - as per advance GI Dr. Dixon outpatient ERCP and cholecystectomy to divert bile and heal fistula, recommended vac wound  - no further intervention as per IR and surgery   - hb level stable at this time - no active bleeding noted   - Last a1c 9.1 04/29  - on insulin pump   - Endocrinology Dr Sutherland signed off     - EP on board: s/p interrogation of AICD 04/28 (normally functioning)  - Cardiology Dr Nelson is on board  - Continue Lasix PO 40mg PRN and aldactone 25mg QD   - Continue Digoxin 125mg QD  - Continue Aspirin 81mg/ Prasugrel 10mg, Rosuvastatin 40mg and toprol    LABS:  cret                        7.7    10.62 )-----------( 453      ( 08 May 2021 05:33 )             25.8     05-08    136  |  98  |  14  ----------------------------<  113<H>  4.8   |  27  |  0.8    Ca    8.8      08 May 2021 05:33    TPro  6.8  /  Alb  2.7<L>  /  TBili  0.5  /  DBili  x   /  AST  14  /  ALT  12  /  AlkPhos  82  05-08        Pt is seen and evaluated at bedside with nursing staff     1. Septic arthritis of Left knee with severe synovitis  2. 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 MSSA bacteremia   4. Biliary drainage from Percutaneous Fistula site around Prior Percutaneous Cholecystostomy Tube Site  5. Chronic iron deficiency anemia with component of ACD  6. DMII   7. Chronic HFrEF s/p AICD   8. CAD    - XR bilateral knees (04/26) large joint effusion in left knee  - s/p arthroscopic drainage of left knee on 04/26 and OR washout 4/26 - 20mL purulent fluid in left knee - drains removed  - Synovial Culture (04/26) MSSA  - Blood Culture (04/26) x2, 04/27 NGTD, 04/28 x2 SJ, 04/30-05/01 NGTD, 05/02 and 05/03 received  - JOHN PAUL - no endocarditis noted   - currently on nafcillin 2 q4 -->plan to discharge on cefazolin 2 q8 as per ID for total of 6 weeks   - continue with pain control   - History of acute cholecystitis s/p cholecystostomy tube placement in February 2020 s/p removal in May 2020, with fistula formation and persistent bilious drainage from the prior tract site as well as a RUQ abdominal abscess at the site s/p drainage  - Previous Intraabdominal 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 Decreased area of right upper quadrant subcutaneous stranding at site of prior percutaneous cholecystostomy, without evidence of abscess.  - HIDA scan 05/02: cannot visualize GB  - Repeat MRI of knee revealed septic arthritis of the knee with osteomyelitis in the distal femur and proximal tibia. Associated large joint effusion with extensive synovitis. No additional collection identified.    Provider Handoff:  Pending Picc line. As pre IR patient can not get picc line until 48 hours prior to discharge. Once discharge is anticipated please call IR back to request picc line again   Dispo: patient is refusing SNF, minimal participation with PT     Plan of care was discussed with patient ; all questions and concerns were addressed and care was aligned with patient's wishes.  - as per advance GI Dr. Dixon outpatient ERCP and cholecystectomy to divert bile and heal fistula, recommended vac wound  - no further intervention as per IR and surgery   - hb level stable at this time - no active bleeding noted   - Last a1c 9.1 04/29  - on insulin pump   - Endocrinology Dr Sutherland signed off   - EP on board: s/p interrogation of AICD 04/28 (normally functioning)  - Cardiology Dr Nelson is on board  - Continue Lasix PO 40mg PRN and aldactone 25mg QD   - Continue Digoxin 125mg QD  - Continue Aspirin 81mg/ Prasugrel 10mg, Rosuvastatin 40mg and toprol

## 2021-05-08 NOTE — PROGRESS NOTE ADULT - ATTENDING COMMENTS
Pt. seen/ examined  Labs/ vitals reviewed  Discussed results of aspiration from yesterday, possible re-aspiration tomorrow, bacterial cultures, possible re-operation  Will follow

## 2021-05-09 LAB
B PERT IGG+IGM PNL SER: ABNORMAL
COLOR FLD: SIGNIFICANT CHANGE UP
CULTURE RESULTS: SIGNIFICANT CHANGE UP
FLUID INTAKE SUBSTANCE CLASS: SIGNIFICANT CHANGE UP
FLUID SEGMENTED GRANULOCYTES: SIGNIFICANT CHANGE UP %
GRAM STN FLD: SIGNIFICANT CHANGE UP
GRAM STN FLD: SIGNIFICANT CHANGE UP
LYMPHOCYTES # FLD: SIGNIFICANT CHANGE UP
MONOS+MACROS # FLD: SIGNIFICANT CHANGE UP %
RCV VOL RI: HIGH /UL (ref 0–0)
SPECIMEN SOURCE: SIGNIFICANT CHANGE UP
SPECIMEN SOURCE: SIGNIFICANT CHANGE UP
TOTAL NUCLEATED CELL COUNT, BODY FLUID: SIGNIFICANT CHANGE UP /UL
TUBE TYPE: SIGNIFICANT CHANGE UP

## 2021-05-09 PROCEDURE — 99232 SBSQ HOSP IP/OBS MODERATE 35: CPT

## 2021-05-09 RX ADMIN — GABAPENTIN 100 MILLIGRAM(S): 400 CAPSULE ORAL at 05:09

## 2021-05-09 RX ADMIN — MORPHINE SULFATE 15 MILLIGRAM(S): 50 CAPSULE, EXTENDED RELEASE ORAL at 05:09

## 2021-05-09 RX ADMIN — SPIRONOLACTONE 25 MILLIGRAM(S): 25 TABLET, FILM COATED ORAL at 05:09

## 2021-05-09 RX ADMIN — MORPHINE SULFATE 6 MILLIGRAM(S): 50 CAPSULE, EXTENDED RELEASE ORAL at 19:47

## 2021-05-09 RX ADMIN — Medication 1 TABLET(S): at 13:11

## 2021-05-09 RX ADMIN — NAFCILLIN 200 GRAM(S): 10 INJECTION, POWDER, FOR SOLUTION INTRAVENOUS at 01:17

## 2021-05-09 RX ADMIN — Medication 25 MILLIGRAM(S): at 05:09

## 2021-05-09 RX ADMIN — Medication 125 MICROGRAM(S): at 05:09

## 2021-05-09 RX ADMIN — ATORVASTATIN CALCIUM 80 MILLIGRAM(S): 80 TABLET, FILM COATED ORAL at 21:27

## 2021-05-09 RX ADMIN — DULOXETINE HYDROCHLORIDE 90 MILLIGRAM(S): 30 CAPSULE, DELAYED RELEASE ORAL at 13:12

## 2021-05-09 RX ADMIN — MORPHINE SULFATE 6 MILLIGRAM(S): 50 CAPSULE, EXTENDED RELEASE ORAL at 05:14

## 2021-05-09 RX ADMIN — NAFCILLIN 200 GRAM(S): 10 INJECTION, POWDER, FOR SOLUTION INTRAVENOUS at 17:08

## 2021-05-09 RX ADMIN — MORPHINE SULFATE 6 MILLIGRAM(S): 50 CAPSULE, EXTENDED RELEASE ORAL at 05:32

## 2021-05-09 RX ADMIN — GABAPENTIN 100 MILLIGRAM(S): 400 CAPSULE ORAL at 21:27

## 2021-05-09 RX ADMIN — MORPHINE SULFATE 6 MILLIGRAM(S): 50 CAPSULE, EXTENDED RELEASE ORAL at 20:00

## 2021-05-09 RX ADMIN — NAFCILLIN 200 GRAM(S): 10 INJECTION, POWDER, FOR SOLUTION INTRAVENOUS at 21:26

## 2021-05-09 RX ADMIN — GABAPENTIN 100 MILLIGRAM(S): 400 CAPSULE ORAL at 13:12

## 2021-05-09 RX ADMIN — MORPHINE SULFATE 15 MILLIGRAM(S): 50 CAPSULE, EXTENDED RELEASE ORAL at 17:08

## 2021-05-09 RX ADMIN — MORPHINE SULFATE 15 MILLIGRAM(S): 50 CAPSULE, EXTENDED RELEASE ORAL at 17:07

## 2021-05-09 RX ADMIN — SENNA PLUS 2 TABLET(S): 8.6 TABLET ORAL at 21:27

## 2021-05-09 RX ADMIN — Medication 500 MILLIGRAM(S): at 13:11

## 2021-05-09 RX ADMIN — NAFCILLIN 200 GRAM(S): 10 INJECTION, POWDER, FOR SOLUTION INTRAVENOUS at 10:53

## 2021-05-09 RX ADMIN — PRASUGREL 10 MILLIGRAM(S): 5 TABLET, FILM COATED ORAL at 13:11

## 2021-05-09 RX ADMIN — NAFCILLIN 200 GRAM(S): 10 INJECTION, POWDER, FOR SOLUTION INTRAVENOUS at 17:07

## 2021-05-09 RX ADMIN — NAFCILLIN 200 GRAM(S): 10 INJECTION, POWDER, FOR SOLUTION INTRAVENOUS at 05:10

## 2021-05-09 RX ADMIN — ENOXAPARIN SODIUM 40 MILLIGRAM(S): 100 INJECTION SUBCUTANEOUS at 13:13

## 2021-05-09 RX ADMIN — Medication 81 MILLIGRAM(S): at 13:12

## 2021-05-09 RX ADMIN — MORPHINE SULFATE 6 MILLIGRAM(S): 50 CAPSULE, EXTENDED RELEASE ORAL at 00:01

## 2021-05-09 RX ADMIN — PANTOPRAZOLE SODIUM 40 MILLIGRAM(S): 20 TABLET, DELAYED RELEASE ORAL at 05:09

## 2021-05-09 NOTE — PROGRESS NOTE ADULT - SUBJECTIVE AND OBJECTIVE BOX
Patient examined in bed. Patient remains alert and oriented with pain adequately controlled with current pain management. Encouraged him to continue to ask for prescribed pain medication if break through pain. Only complaint is left knee pain with ROM

## 2021-05-09 NOTE — PROGRESS NOTE ADULT - ASSESSMENT
Again discussed serial aspirations treatment plan  After discussion of risks/ benefits/ alternatives, L knee aspirated under strict sterile conditions  Arthrocentesis yielded 15 ml bloody fluid  Sent cell count, Gram stain, cultures  Will follow - if worsening clinical picture, may proceed with repeat I&D No

## 2021-05-09 NOTE — PROGRESS NOTE ADULT - ASSESSMENT
CHIEF COMPLAINT:    Patient is a 65y old  Male who presents with a chief complaint of Septic arthritis (08 May 2021 10:02)      INTERVAL HPI/OVERNIGHT EVENTS:    Patient seen and examined at bedside. No acute overnight events occurred.    ROS: All other systems are negative.    Vital Signs:    T(F): 97.3 (05-09-21 @ 05:23), Max: 99.2 (05-08-21 @ 21:34)  HR: 80 (05-09-21 @ 05:23) (71 - 82)  BP: 120/64 (05-09-21 @ 05:23) (102/52 - 120/64)  RR: 18 (05-09-21 @ 05:23) (18 - 18)  SpO2: --  I&O's Summary    08 May 2021 07:01  -  09 May 2021 07:00  --------------------------------------------------------  IN: 420 mL / OUT: 803 mL / NET: -383 mL      Daily     Daily   CAPILLARY BLOOD GLUCOSE      POCT Blood Glucose.: 196 mg/dL (08 May 2021 14:00)      PHYSICAL EXAM:  GENERAL:  NAD  SKIN: No rashes or lesions  HEENT: Atraumatic. Normocephalic. Anicteric  NECK:  No JVD.   PULMONARY: Clear to ausculation bilaterally. No wheezing. No rales  CVS: Normal S1, S2. Regular rate and rhythm. No murmurs.  ABDOMEN/GI: Soft, Nontender, Nondistended; Bowel sounds are present  EXTREMITIES:  No edema B/L LE.  NEUROLOGIC:  No motor deficit.  PSYCH: Alert & oriented x 3, normal affect    Consultant(s) Notes Reviewed:  [x ] YES  [ ] NO  Care Discussed with Consultants/Other Providers [ x] YES  [ ] NO    LABS:                        7.7    10.62 )-----------( 453      ( 08 May 2021 05:33 )             25.8     05-08    136  |  98  |  14  ----------------------------<  113<H>  4.8   |  27  |  0.8    Ca    8.8      08 May 2021 05:33    TPro  6.8  /  Alb  2.7<L>  /  TBili  0.5  /  DBili  x   /  AST  14  /  ALT  12  /  AlkPhos  82  05-08              RADIOLOGY & ADDITIONAL TESTS:  Imaging or report Personally Reviewed:  [ ] YES  [ ] NO    EKG reviewed independently    Medications:  Standing  ascorbic acid 500 milliGRAM(s) Oral daily  aspirin enteric coated 81 milliGRAM(s) Oral daily  atorvastatin 80 milliGRAM(s) Oral at bedtime  chlorhexidine 4% Liquid 1 Application(s) Topical <User Schedule>  dextrose 40% Gel 15 Gram(s) Oral once  dextrose 5%. 1000 milliLiter(s) IV Continuous <Continuous>  dextrose 5%. 1000 milliLiter(s) IV Continuous <Continuous>  dextrose 50% Injectable 25 Gram(s) IV Push once  dextrose 50% Injectable 12.5 Gram(s) IV Push once  dextrose 50% Injectable 25 Gram(s) IV Push once  digoxin     Tablet 125 MICROGram(s) Oral daily  DULoxetine 90 milliGRAM(s) Oral daily  enoxaparin Injectable 40 milliGRAM(s) SubCutaneous daily  gabapentin 100 milliGRAM(s) Oral every 8 hours  glucagon  Injectable 1 milliGRAM(s) IntraMuscular once  metoprolol succinate ER 25 milliGRAM(s) Oral daily  morphine ER Tablet 15 milliGRAM(s) Oral two times a day  multivitamin/minerals 1 Tablet(s) Oral daily  nafcillin  IVPB 2 Gram(s) IV Intermittent every 4 hours  nafcillin  IVPB      pantoprazole    Tablet 40 milliGRAM(s) Oral before breakfast  polyethylene glycol 3350 17 Gram(s) Oral two times a day  prasugrel 10 milliGRAM(s) Oral daily  senna 2 Tablet(s) Oral at bedtime  spironolactone 25 milliGRAM(s) Oral daily    PRN Meds  acetaminophen   Tablet .. 650 milliGRAM(s) Oral every 6 hours PRN  furosemide    Tablet 40 milliGRAM(s) Oral daily PRN  melatonin 10 milliGRAM(s) Oral at bedtime PRN  morphine  - Injectable 6 milliGRAM(s) IV Push every 4 hours PRN    Care discussed with pt         CHIEF COMPLAINT:    Patient is a 65y old  Male who presents with a chief complaint of Septic arthritis (08 May 2021 10:02)      INTERVAL HPI/OVERNIGHT EVENTS:    Patient seen and examined at bedside. No acute overnight events occurred.    ROS: All other systems are negative.    Vital Signs:    T(F): 97.3 (05-09-21 @ 05:23), Max: 99.2 (05-08-21 @ 21:34)  HR: 80 (05-09-21 @ 05:23) (71 - 82)  BP: 120/64 (05-09-21 @ 05:23) (102/52 - 120/64)  RR: 18 (05-09-21 @ 05:23) (18 - 18)  SpO2: --  I&O's Summary    08 May 2021 07:01  -  09 May 2021 07:00  --------------------------------------------------------  IN: 420 mL / OUT: 803 mL / NET: -383 mL      Daily     Daily   CAPILLARY BLOOD GLUCOSE      POCT Blood Glucose.: 196 mg/dL (08 May 2021 14:00)      PHYSICAL EXAM:  GENERAL:  NAD  SKIN: No rashes or lesions  HEENT: Atraumatic. Normocephalic. Anicteric  NECK:  No JVD.   PULMONARY: Clear to ausculation bilaterally. No wheezing. No rales  CVS: Normal S1, S2. Regular rate and rhythm. No murmurs.  ABDOMEN/GI: Soft, Nontender, Nondistended; Bowel sounds are present  EXTREMITIES:  No edema B/L LE.  NEUROLOGIC:  No motor deficit.  PSYCH: Alert & oriented x 3, normal affect    Consultant(s) Notes Reviewed:  [x ] YES  [ ] NO  Care Discussed with Consultants/Other Providers [ x] YES  [ ] NO    LABS:                        7.7    10.62 )-----------( 453      ( 08 May 2021 05:33 )             25.8     05-08    136  |  98  |  14  ----------------------------<  113<H>  4.8   |  27  |  0.8    Ca    8.8      08 May 2021 05:33    TPro  6.8  /  Alb  2.7<L>  /  TBili  0.5  /  DBili  x   /  AST  14  /  ALT  12  /  AlkPhos  82  05-08      RADIOLOGY & ADDITIONAL TESTS:  Imaging or report Personally Reviewed:  [y ] YES  [ ] NO    Medications:  Standing  ascorbic acid 500 milliGRAM(s) Oral daily  aspirin enteric coated 81 milliGRAM(s) Oral daily  atorvastatin 80 milliGRAM(s) Oral at bedtime  chlorhexidine 4% Liquid 1 Application(s) Topical <User Schedule>  dextrose 40% Gel 15 Gram(s) Oral once  dextrose 5%. 1000 milliLiter(s) IV Continuous <Continuous>  dextrose 5%. 1000 milliLiter(s) IV Continuous <Continuous>  dextrose 50% Injectable 25 Gram(s) IV Push once  dextrose 50% Injectable 12.5 Gram(s) IV Push once  dextrose 50% Injectable 25 Gram(s) IV Push once  digoxin     Tablet 125 MICROGram(s) Oral daily  DULoxetine 90 milliGRAM(s) Oral daily  enoxaparin Injectable 40 milliGRAM(s) SubCutaneous daily  gabapentin 100 milliGRAM(s) Oral every 8 hours  glucagon  Injectable 1 milliGRAM(s) IntraMuscular once  metoprolol succinate ER 25 milliGRAM(s) Oral daily  morphine ER Tablet 15 milliGRAM(s) Oral two times a day  multivitamin/minerals 1 Tablet(s) Oral daily  nafcillin  IVPB 2 Gram(s) IV Intermittent every 4 hours  nafcillin  IVPB      pantoprazole    Tablet 40 milliGRAM(s) Oral before breakfast  polyethylene glycol 3350 17 Gram(s) Oral two times a day  prasugrel 10 milliGRAM(s) Oral daily  senna 2 Tablet(s) Oral at bedtime  spironolactone 25 milliGRAM(s) Oral daily    PRN Meds  acetaminophen   Tablet .. 650 milliGRAM(s) Oral every 6 hours PRN  furosemide    Tablet 40 milliGRAM(s) Oral daily PRN  melatonin 10 milliGRAM(s) Oral at bedtime PRN  morphine  - Injectable 6 milliGRAM(s) IV Push every 4 hours PRN    Care discussed with pt    continue serial knee aspiration as pre ortho recommendations, yesterday was the first.   Surgery following bilious drainage    CHIEF COMPLAINT:    Patient is a 65y old  Male who presents with a chief complaint of Septic arthritis (08 May 2021 10:02)      INTERVAL HPI/OVERNIGHT EVENTS:    Patient seen and examined at bedside. No acute overnight events occurred.    ROS: All other systems are negative.    Vital Signs:    T(F): 97.3 (05-09-21 @ 05:23), Max: 99.2 (05-08-21 @ 21:34)  HR: 80 (05-09-21 @ 05:23) (71 - 82)  BP: 120/64 (05-09-21 @ 05:23) (102/52 - 120/64)  RR: 18 (05-09-21 @ 05:23) (18 - 18)  SpO2: --  I&O's Summary    08 May 2021 07:01  -  09 May 2021 07:00  --------------------------------------------------------  IN: 420 mL / OUT: 803 mL / NET: -383 mL      Daily     Daily   CAPILLARY BLOOD GLUCOSE      POCT Blood Glucose.: 196 mg/dL (08 May 2021 14:00)      PHYSICAL EXAM:  GENERAL:  Elderly  male laying in bed in NAD  SKIN: b/l lower extremity healing chronic peripheral vascular ischemic lesions  HEENT: Atraumatic. Normocephalic. Anicteric  NECK:  No JVD.   PULMONARY: Clear to ausculation bilaterally. No wheezing. No rales  CVS: Normal S1, S2. Regular rate and rhythm. No murmurs.  ABDOMEN/GI: Soft, Nontender, Nondistended; Bowel sounds are present  EXTREMITIES:  No edema B/L LE.  NEUROLOGIC:  No motor deficit.  PSYCH: Alert & oriented x 3, normal affect    Consultant(s) Notes Reviewed:  [x ] YES  [ ] NO    LABS:                        7.7    10.62 )-----------( 453      ( 08 May 2021 05:33 )             25.8     05-08    136  |  98  |  14  ----------------------------<  113<H>  4.8   |  27  |  0.8    Ca    8.8      08 May 2021 05:33    TPro  6.8  /  Alb  2.7<L>  /  TBili  0.5  /  DBili  x   /  AST  14  /  ALT  12  /  AlkPhos  82  05-08      RADIOLOGY & ADDITIONAL TESTS:  Imaging or report Personally Reviewed:  [y ] YES  [ ] NO    Medications:  Standing  ascorbic acid 500 milliGRAM(s) Oral daily  aspirin enteric coated 81 milliGRAM(s) Oral daily  atorvastatin 80 milliGRAM(s) Oral at bedtime  chlorhexidine 4% Liquid 1 Application(s) Topical <User Schedule>  dextrose 40% Gel 15 Gram(s) Oral once  dextrose 5%. 1000 milliLiter(s) IV Continuous <Continuous>  dextrose 5%. 1000 milliLiter(s) IV Continuous <Continuous>  dextrose 50% Injectable 25 Gram(s) IV Push once  dextrose 50% Injectable 12.5 Gram(s) IV Push once  dextrose 50% Injectable 25 Gram(s) IV Push once  digoxin     Tablet 125 MICROGram(s) Oral daily  DULoxetine 90 milliGRAM(s) Oral daily  enoxaparin Injectable 40 milliGRAM(s) SubCutaneous daily  gabapentin 100 milliGRAM(s) Oral every 8 hours  glucagon  Injectable 1 milliGRAM(s) IntraMuscular once  metoprolol succinate ER 25 milliGRAM(s) Oral daily  morphine ER Tablet 15 milliGRAM(s) Oral two times a day  multivitamin/minerals 1 Tablet(s) Oral daily  nafcillin  IVPB 2 Gram(s) IV Intermittent every 4 hours  nafcillin  IVPB      pantoprazole    Tablet 40 milliGRAM(s) Oral before breakfast  polyethylene glycol 3350 17 Gram(s) Oral two times a day  prasugrel 10 milliGRAM(s) Oral daily  senna 2 Tablet(s) Oral at bedtime  spironolactone 25 milliGRAM(s) Oral daily    PRN Meds  acetaminophen   Tablet .. 650 milliGRAM(s) Oral every 6 hours PRN  furosemide    Tablet 40 milliGRAM(s) Oral daily PRN  melatonin 10 milliGRAM(s) Oral at bedtime PRN  morphine  - Injectable 6 milliGRAM(s) IV Push every 4 hours PRN    Care discussed with pt    continue serial knee aspiration as pre ortho recommendations, yesterday was the first.   Surgery following bilious drainage

## 2021-05-09 NOTE — PROGRESS NOTE ADULT - SUBJECTIVE AND OBJECTIVE BOX
Pt. seen/ examined today  Labs/Vitals reviewed  L knee effusion increased slightly  Wounds C/D/I  No erythema  Significant pain w/ attempted ROM

## 2021-05-10 LAB
CULTURE RESULTS: SIGNIFICANT CHANGE UP
ORGANISM # SPEC MICROSCOPIC CNT: SIGNIFICANT CHANGE UP
ORGANISM # SPEC MICROSCOPIC CNT: SIGNIFICANT CHANGE UP
SPECIMEN SOURCE: SIGNIFICANT CHANGE UP

## 2021-05-10 PROCEDURE — 99232 SBSQ HOSP IP/OBS MODERATE 35: CPT

## 2021-05-10 RX ADMIN — MORPHINE SULFATE 6 MILLIGRAM(S): 50 CAPSULE, EXTENDED RELEASE ORAL at 15:32

## 2021-05-10 RX ADMIN — Medication 25 MILLIGRAM(S): at 05:23

## 2021-05-10 RX ADMIN — GABAPENTIN 100 MILLIGRAM(S): 400 CAPSULE ORAL at 21:11

## 2021-05-10 RX ADMIN — MORPHINE SULFATE 6 MILLIGRAM(S): 50 CAPSULE, EXTENDED RELEASE ORAL at 02:08

## 2021-05-10 RX ADMIN — MORPHINE SULFATE 15 MILLIGRAM(S): 50 CAPSULE, EXTENDED RELEASE ORAL at 17:45

## 2021-05-10 RX ADMIN — NAFCILLIN 200 GRAM(S): 10 INJECTION, POWDER, FOR SOLUTION INTRAVENOUS at 17:45

## 2021-05-10 RX ADMIN — POLYETHYLENE GLYCOL 3350 17 GRAM(S): 17 POWDER, FOR SOLUTION ORAL at 05:23

## 2021-05-10 RX ADMIN — MORPHINE SULFATE 6 MILLIGRAM(S): 50 CAPSULE, EXTENDED RELEASE ORAL at 10:12

## 2021-05-10 RX ADMIN — NAFCILLIN 200 GRAM(S): 10 INJECTION, POWDER, FOR SOLUTION INTRAVENOUS at 05:26

## 2021-05-10 RX ADMIN — NAFCILLIN 200 GRAM(S): 10 INJECTION, POWDER, FOR SOLUTION INTRAVENOUS at 10:12

## 2021-05-10 RX ADMIN — NAFCILLIN 200 GRAM(S): 10 INJECTION, POWDER, FOR SOLUTION INTRAVENOUS at 21:11

## 2021-05-10 RX ADMIN — DULOXETINE HYDROCHLORIDE 90 MILLIGRAM(S): 30 CAPSULE, DELAYED RELEASE ORAL at 11:22

## 2021-05-10 RX ADMIN — MORPHINE SULFATE 6 MILLIGRAM(S): 50 CAPSULE, EXTENDED RELEASE ORAL at 15:47

## 2021-05-10 RX ADMIN — MORPHINE SULFATE 15 MILLIGRAM(S): 50 CAPSULE, EXTENDED RELEASE ORAL at 05:26

## 2021-05-10 RX ADMIN — Medication 500 MILLIGRAM(S): at 11:22

## 2021-05-10 RX ADMIN — PANTOPRAZOLE SODIUM 40 MILLIGRAM(S): 20 TABLET, DELAYED RELEASE ORAL at 05:23

## 2021-05-10 RX ADMIN — MORPHINE SULFATE 6 MILLIGRAM(S): 50 CAPSULE, EXTENDED RELEASE ORAL at 23:52

## 2021-05-10 RX ADMIN — ENOXAPARIN SODIUM 40 MILLIGRAM(S): 100 INJECTION SUBCUTANEOUS at 11:41

## 2021-05-10 RX ADMIN — ATORVASTATIN CALCIUM 80 MILLIGRAM(S): 80 TABLET, FILM COATED ORAL at 21:11

## 2021-05-10 RX ADMIN — NAFCILLIN 200 GRAM(S): 10 INJECTION, POWDER, FOR SOLUTION INTRAVENOUS at 13:39

## 2021-05-10 RX ADMIN — MORPHINE SULFATE 6 MILLIGRAM(S): 50 CAPSULE, EXTENDED RELEASE ORAL at 10:30

## 2021-05-10 RX ADMIN — GABAPENTIN 100 MILLIGRAM(S): 400 CAPSULE ORAL at 05:23

## 2021-05-10 RX ADMIN — SPIRONOLACTONE 25 MILLIGRAM(S): 25 TABLET, FILM COATED ORAL at 05:23

## 2021-05-10 RX ADMIN — GABAPENTIN 100 MILLIGRAM(S): 400 CAPSULE ORAL at 13:39

## 2021-05-10 RX ADMIN — NAFCILLIN 200 GRAM(S): 10 INJECTION, POWDER, FOR SOLUTION INTRAVENOUS at 02:00

## 2021-05-10 RX ADMIN — MORPHINE SULFATE 6 MILLIGRAM(S): 50 CAPSULE, EXTENDED RELEASE ORAL at 02:30

## 2021-05-10 RX ADMIN — Medication 125 MICROGRAM(S): at 05:23

## 2021-05-10 RX ADMIN — PRASUGREL 10 MILLIGRAM(S): 5 TABLET, FILM COATED ORAL at 11:21

## 2021-05-10 RX ADMIN — Medication 81 MILLIGRAM(S): at 11:22

## 2021-05-10 RX ADMIN — Medication 1 TABLET(S): at 11:22

## 2021-05-10 NOTE — PROGRESS NOTE ADULT - ASSESSMENT
CHIEF COMPLAINT:    Patient is a 65y old  Male who presents with a chief complaint of Septic arthritis (10 May 2021 06:16)      INTERVAL HPI/OVERNIGHT EVENTS:    Patient seen and examined at bedside. No acute overnight events occurred.    ROS: All other systems are negative.    PHYSICAL EXAM:  GENERAL:  Elderly  male laying in bed in NAD  SKIN: b/l lower extremity healing chronic peripheral vascular ischemic lesions  HEENT: Atraumatic. Normocephalic. Anicteric  NECK:  No JVD.   PULMONARY: Clear to ausculation bilaterally. No wheezing. No rales  CVS: Normal S1, S2. Regular rate and rhythm. No murmurs.  ABDOMEN/GI: Soft, Nontender, Nondistended; Bowel sounds are present  EXTREMITIES:  No edema B/L LE.  NEUROLOGIC:  No motor deficit.  PSYCH: Alert & oriented x 3, normal affect    Vital Signs:    T(F): 98.3 (05-10-21 @ 05:00), Max: 98.3 (21 @ 21:47)  HR: 74 (05-10-21 @ 05:00) (70 - 80)  BP: 115/57 (05-10-21 @ 05:00) (101/59 - 125/69)  RR: 18 (05-10-21 @ 05:00) (18 - 18)  SpO2: --  I&O's Summary    09 May 2021 07:01  -  10 May 2021 07:00  --------------------------------------------------------  IN: 620 mL / OUT: 800 mL / NET: -180 mL      Daily     Daily Weight in k.9 (10 May 2021 05:00)  CAPILLARY BLOOD GLUCOSE              Consultant(s) Notes Reviewed:  [x ] YES  [ ] NO  Care Discussed with Consultants/Other Providers [ x] YES  [ ] NO    LABS:      Culture - Body Fluid with Gram Stain (collected 09 May 2021 14:10)  Source: .Body Fluid Synovial Fluid  Gram Stain (09 May 2021 23:07):    Numerous polymorphonuclear leukocytes per low power field    No organisms seen per oil power field        RADIOLOGY & ADDITIONAL TESTS:  Imaging or report Personally Reviewed:  [ x] YES  [ ] NO      Medications:  Standing  ascorbic acid 500 milliGRAM(s) Oral daily  aspirin enteric coated 81 milliGRAM(s) Oral daily  atorvastatin 80 milliGRAM(s) Oral at bedtime  chlorhexidine 4% Liquid 1 Application(s) Topical <User Schedule>  dextrose 40% Gel 15 Gram(s) Oral once  dextrose 5%. 1000 milliLiter(s) IV Continuous <Continuous>  dextrose 5%. 1000 milliLiter(s) IV Continuous <Continuous>  dextrose 50% Injectable 12.5 Gram(s) IV Push once  dextrose 50% Injectable 25 Gram(s) IV Push once  dextrose 50% Injectable 25 Gram(s) IV Push once  digoxin     Tablet 125 MICROGram(s) Oral daily  DULoxetine 90 milliGRAM(s) Oral daily  enoxaparin Injectable 40 milliGRAM(s) SubCutaneous daily  gabapentin 100 milliGRAM(s) Oral every 8 hours  glucagon  Injectable 1 milliGRAM(s) IntraMuscular once  metoprolol succinate ER 25 milliGRAM(s) Oral daily  morphine ER Tablet 15 milliGRAM(s) Oral two times a day  multivitamin/minerals 1 Tablet(s) Oral daily  nafcillin  IVPB      nafcillin  IVPB 2 Gram(s) IV Intermittent every 4 hours  pantoprazole    Tablet 40 milliGRAM(s) Oral before breakfast  polyethylene glycol 3350 17 Gram(s) Oral two times a day  prasugrel 10 milliGRAM(s) Oral daily  senna 2 Tablet(s) Oral at bedtime  spironolactone 25 milliGRAM(s) Oral daily    PRN Meds  acetaminophen   Tablet .. 650 milliGRAM(s) Oral every 6 hours PRN  furosemide    Tablet 40 milliGRAM(s) Oral daily PRN  melatonin 10 milliGRAM(s) Oral at bedtime PRN  morphine  - Injectable 6 milliGRAM(s) IV Push every 4 hours PRN      continue serial knee aspiration as pre ortho recommendations, yesterday was the first.   Surgery following bilious drainage   Scheduled for I and D this am.          CHIEF COMPLAINT:    Patient is a 65y old  Male who presents with a chief complaint of Septic arthritis (10 May 2021 06:16)      INTERVAL HPI/OVERNIGHT EVENTS:    Patient seen and examined at bedside. No acute overnight events occurred.    ROS: All other systems are negative.    PHYSICAL EXAM:  GENERAL:  Elderly  male laying in bed in NAD  SKIN: b/l lower extremity healing chronic peripheral vascular ischemic lesions  HEENT: Atraumatic. Normocephalic. Anicteric  NECK:  No JVD.   PULMONARY: Clear to ausculation bilaterally. No wheezing. No rales  CVS: Normal S1, S2. Regular rate and rhythm. No murmurs.  ABDOMEN/GI: Soft, Nontender, Nondistended; Bowel sounds are present  EXTREMITIES:  No edema B/L LE.  NEUROLOGIC:  No motor deficit.  PSYCH: Alert & oriented x 3, normal affect    Vital Signs:    T(F): 98.3 (05-10-21 @ 05:00), Max: 98.3 (21 @ 21:47)  HR: 74 (05-10-21 @ 05:00) (70 - 80)  BP: 115/57 (05-10-21 @ 05:00) (101/59 - 125/69)  RR: 18 (05-10-21 @ 05:00) (18 - 18)  SpO2: --  I&O's Summary    09 May 2021 07:01  -  10 May 2021 07:00  --------------------------------------------------------  IN: 620 mL / OUT: 800 mL / NET: -180 mL      Daily     Daily Weight in k.9 (10 May 2021 05:00)  CAPILLARY BLOOD GLUCOSE              Consultant(s) Notes Reviewed:  [x ] YES  [ ] NO  Care Discussed with Consultants/Other Providers [ x] YES  [ ] NO    LABS:      Culture - Body Fluid with Gram Stain (collected 09 May 2021 14:10)  Source: .Body Fluid Synovial Fluid  Gram Stain (09 May 2021 23:07):    Numerous polymorphonuclear leukocytes per low power field    No organisms seen per oil power field        RADIOLOGY & ADDITIONAL TESTS:  Imaging or report Personally Reviewed:  [ x] YES  [ ] NO      Medications:  Standing  ascorbic acid 500 milliGRAM(s) Oral daily  aspirin enteric coated 81 milliGRAM(s) Oral daily  atorvastatin 80 milliGRAM(s) Oral at bedtime  chlorhexidine 4% Liquid 1 Application(s) Topical <User Schedule>  dextrose 40% Gel 15 Gram(s) Oral once  dextrose 5%. 1000 milliLiter(s) IV Continuous <Continuous>  dextrose 5%. 1000 milliLiter(s) IV Continuous <Continuous>  dextrose 50% Injectable 12.5 Gram(s) IV Push once  dextrose 50% Injectable 25 Gram(s) IV Push once  dextrose 50% Injectable 25 Gram(s) IV Push once  digoxin     Tablet 125 MICROGram(s) Oral daily  DULoxetine 90 milliGRAM(s) Oral daily  enoxaparin Injectable 40 milliGRAM(s) SubCutaneous daily  gabapentin 100 milliGRAM(s) Oral every 8 hours  glucagon  Injectable 1 milliGRAM(s) IntraMuscular once  metoprolol succinate ER 25 milliGRAM(s) Oral daily  morphine ER Tablet 15 milliGRAM(s) Oral two times a day  multivitamin/minerals 1 Tablet(s) Oral daily  nafcillin  IVPB      nafcillin  IVPB 2 Gram(s) IV Intermittent every 4 hours  pantoprazole    Tablet 40 milliGRAM(s) Oral before breakfast  polyethylene glycol 3350 17 Gram(s) Oral two times a day  prasugrel 10 milliGRAM(s) Oral daily  senna 2 Tablet(s) Oral at bedtime  spironolactone 25 milliGRAM(s) Oral daily    PRN Meds  acetaminophen   Tablet .. 650 milliGRAM(s) Oral every 6 hours PRN  furosemide    Tablet 40 milliGRAM(s) Oral daily PRN  melatonin 10 milliGRAM(s) Oral at bedtime PRN  morphine  - Injectable 6 milliGRAM(s) IV Push every 4 hours PRN      continue serial knee aspiration as pre ortho recommendations, yesterday was the first.   Surgery following bilious drainage   Scheduled for I and D this am but was canceled as patient's wbc count has decreased significantly   Spoke with IR. He will need a picc line insertion as pre ID recommendation. IR will not insert the picc line until he is 48 hours from discharge. please call IR once he is ready for discharge  Continue Daily PT    #Progress Note Handoff  Pending (specify):  Consults ortho folowing Tests possible I and D if WBC remain elevated this week   Disposition: Home___/SNF___/Other________/Unknown at this time patient refuses to go to SNF as pre PT recommendations. wants to go home

## 2021-05-10 NOTE — PROGRESS NOTE ADULT - SUBJECTIVE AND OBJECTIVE BOX
ORTHOPEDIC SURGERY PRE OP NOTE      Diagnosis: Left septic knee   Planned Procedure: Left knee irrigation and debridement     Risks, benefits, alternatives discussed with patient/family who expressed understanding and wish to proceed with surgery.    ANTICIPATED DATE OF PROCEDURE: 5/10  SCHEDULED CASE OR ADD-ON CASE:  Add-on      Consent: will obtain pre-op      Clearance:   Anesthesia    Labs:    R knee synovial aspiration: 76K, 95%    diet: NPO after breakfast    EKG: complete  CXR: Complete    ICU Vital Signs Last 24 Hrs  T(C): 36.8 (10 May 2021 05:00), Max: 36.8 (09 May 2021 21:47)  T(F): 98.3 (10 May 2021 05:00), Max: 98.3 (09 May 2021 21:47)  HR: 74 (10 May 2021 05:00) (70 - 80)  BP: 115/57 (10 May 2021 05:00) (101/59 - 125/69)  BP(mean): --  ABP: --  ABP(mean): --  RR: 18 (10 May 2021 05:00) (18 - 18)  SpO2: --      ANTICOAGULATION: No contraindication for full dose anticoagulation from Orthopedics    SPECIAL ANTICOAG INSTRUCTIONS: none        A/P: Patient is a 65y y/o Male with left knee septic arthritis, irrigated once and serial aspiration on the floor twice, now for possible Left knee I/D today   -NPO and IVF after breakfast  -pain control/analgesia  -Inc Spirometry  -Notify Ortho with any questions- spectra [id number]    _____x_____ DISCUSSED WITH PRIMARY TEAM MEMBER: [id number]  _____x_____TIME AND DATE DISCUSSED WITH PRIMARY TEAM MEMBER: 5/10/21 [id number] at 06:18

## 2021-05-10 NOTE — PROGRESS NOTE ADULT - SUBJECTIVE AND OBJECTIVE BOX
FLOWER MARISCAL  65y, Male  Allergy: ACE inhibitors (Hives)  carvedilol (Other)  enalapril (Hives)  Entresto (Other)      LOS  14d    CHIEF COMPLAINT: Septic arthritis (10 May 2021 10:30)      INTERVAL EVENTS/HPI  - No acute events overnight  - T(F): , Max: 98.3 (05-09-21 @ 21:47)  - Denies any worsening symptoms  - Tolerating medication  - WBC Count: 10.62 (05-08-21 @ 05:33)     -      ROS  General: Denies rigors, nightsweats  HEENT: Denies headache, rhinorrhea, sore throat, eye pain  CV: Denies CP, palpitations  PULM: Denies wheezing, hemoptysis  GI: Denies hematemesis, hematochezia, melena  : Denies discharge, hematuria  MSK: Denies arthralgias, myalgias  SKIN: Denies rash, lesions  NEURO: Denies paresthesias, weakness  PSYCH: Denies depression, anxiety    VITALS:  T(F): 98.3, Max: 98.3 (05-09-21 @ 21:47)  HR: 74  BP: 115/57  RR: 18Vital Signs Last 24 Hrs  T(C): 36.8 (10 May 2021 05:00), Max: 36.8 (09 May 2021 21:47)  T(F): 98.3 (10 May 2021 05:00), Max: 98.3 (09 May 2021 21:47)  HR: 74 (10 May 2021 05:00) (70 - 80)  BP: 115/57 (10 May 2021 05:00) (101/59 - 125/69)  BP(mean): --  RR: 18 (10 May 2021 05:00) (18 - 18)  SpO2: --    PHYSICAL EXAM:  Gen: NAD, resting in bed  HEENT: Normocephalic, atraumatic  Neck: supple, no lymphadenopathy  CV: Regular rate & regular rhythm  Lungs: decreased BS at bases, no fremitus  Abdomen: Soft, BS present  Ext: Warm, well perfused  Neuro: non focal, awake  Skin: no rash, no erythema  Lines: no phlebitis    FH: Non-contributory  Social Hx: Non-contributory    TESTS & MEASUREMENTS:                  Culture - Body Fluid with Gram Stain (collected 05-09-21 @ 14:10)  Source: .Body Fluid Synovial Fluid  Gram Stain (05-09-21 @ 23:07):    Numerous polymorphonuclear leukocytes per low power field    No organisms seen per oil power field    Culture - Blood (collected 05-04-21 @ 05:33)  Source: .Blood None  Final Report (05-09-21 @ 18:00):    No Growth Final    Culture - Blood (collected 05-03-21 @ 07:47)  Source: .Blood None  Final Report (05-08-21 @ 18:00):    No Growth Final    Culture - Blood (collected 05-02-21 @ 06:11)  Source: .Blood None  Final Report (05-07-21 @ 18:01):    No Growth Final    Culture - Body Fluid with Gram Stain (collected 05-01-21 @ 16:43)  Source: Bile biliary drain  Gram Stain (05-02-21 @ 23:26):    Numerous polymorphonuclear leukocytes per low power field    No organisms seen per oil power field  Final Report (05-07-21 @ 17:49):    Few Pseudomonas aeruginosa    Few Enterococcus faecium (vancomycin resistant)  Organism: Pseudomonas aeruginosa  Enterococcus faecium (vancomycin resistant) (05-07-21 @ 17:49)  Organism: Enterococcus faecium (vancomycin resistant) (05-07-21 @ 17:49)      -  Ampicillin: R >8 Predicts results to ampicillin/sulbactam, amoxacillin-clavulanate and  piperacillin-tazobactam.      -  Daptomycin: SDD 4 The breakpoint for SDD (sensitive dose dependent)is based on a dosage regimen of 8-12 mg/kg administered every 24 h in adults and is intended for serious infections due to E. faecium. Consultation with an infectious diseases specialist is recommended.      -  Levofloxacin: R >4      -  Linezolid: S 1      -  Tetra/Doxy: S <=1      -  Vancomycin: R >16      Method Type: YOLIE  Organism: Pseudomonas aeruginosa (05-07-21 @ 17:49)      -  Amikacin: S <=16      -  Aztreonam: S <=4      -  Cefepime: S <=2      -  Ceftazidime: S 4      -  Ciprofloxacin: S <=0.25      -  Gentamicin: S 4      -  Imipenem: S <=1      -  Levofloxacin: S <=0.5      -  Meropenem: S <=1      -  Piperacillin/Tazobactam: S <=8      -  Tobramycin: S <=2      Method Type: YOLIE    Culture - Blood (collected 05-01-21 @ 06:49)  Source: .Blood None  Final Report (05-06-21 @ 18:00):    No Growth Final    Culture - Blood (collected 05-01-21 @ 06:49)  Source: .Blood None  Final Report (05-06-21 @ 18:00):    No Growth Final    Culture - Blood (collected 04-30-21 @ 17:37)  Source: .Blood None  Final Report (05-05-21 @ 23:00):    No Growth Final    Culture - Blood (collected 04-30-21 @ 11:15)  Source: .Blood None  Final Report (05-05-21 @ 23:00):    No Growth Final    Culture - Blood (collected 04-28-21 @ 08:13)  Source: .Blood Blood  Gram Stain (04-29-21 @ 15:21):    Growth in anaerobic bottle: Gram Positive Cocci in Clusters  Final Report (04-30-21 @ 14:03):    Growth in anaerobic bottle: Staphylococcus aureus    See previous culture [id number]    Culture - Blood (collected 04-28-21 @ 08:13)  Source: .Blood Blood  Gram Stain (04-30-21 @ 02:25):    Growth in anaerobic bottle: Gram Positive Cocci in Clusters  Final Report (04-30-21 @ 20:23):    Growth in anaerobic bottle: Staphylococcus aureus    See previous culture [id number]    Culture - Blood (collected 04-27-21 @ 06:14)  Source: .Blood None  Final Report (05-02-21 @ 13:01):    No Growth Final    Culture - Surgical Swab (collected 04-26-21 @ 08:52)  Source: .Surgical Swab None  Final Report (05-01-21 @ 17:29):    Moderate Staphylococcus aureus  Organism: Staphylococcus aureus (05-01-21 @ 17:29)  Organism: Staphylococcus aureus (05-01-21 @ 17:29)      -  Ampicillin/Sulbactam: S <=8/4      -  Cefazolin: S <=4      -  Clindamycin: R <=0.25 This isolate is presumed to be clindamycin resistant based on detection of inducible resistance. Clindamycin may still be effective in some patients.      -  Erythromycin: R >4      -  Gentamicin: S <=1 Should not be used as monotherapy      -  Oxacillin: S <=0.25      -  Penicillin: R 4      -  RIF- Rifampin: S <=1 Should not be used as monotherapy      -  Tetra/Doxy: S <=1      -  Trimethoprim/Sulfamethoxazole: S <=0.5/9.5      -  Vancomycin: S 1      Method Type: YOLIE    Culture - Body Fluid with Gram Stain (collected 04-26-21 @ 01:15)  Source: .Body Fluid Synovial Fluid  Gram Stain (04-26-21 @ 11:35):    polymorphonuclear leukocytes per low power field    No organisms seen per oil power field    by cytocentrifuge  Final Report (05-10-21 @ 10:35):    Numerous Staphylococcus aureus  Organism: Staphylococcus aureus (05-10-21 @ 10:35)  Organism: Staphylococcus aureus (05-10-21 @ 10:35)      -  Ampicillin/Sulbactam: S <=8/4      -  Cefazolin: S <=4      -  Clindamycin: R <=0.25 This isolate is presumed to be clindamycin resistant based on detection of inducible resistance. Clindamycin may still be effective in some patients.      -  Erythromycin: R >4      -  Gentamicin: S <=1 Should not be used as monotherapy      -  Oxacillin: S <=0.25      -  Penicillin: R 4      -  RIF- Rifampin: S <=1 Should not be used as monotherapy      -  Tetra/Doxy: S <=1      -  Trimethoprim/Sulfamethoxazole: S <=0.5/9.5      -  Vancomycin: S 2      Method Type: YOLIE    Culture - Urine (collected 04-26-21 @ 00:31)  Source: .Urine Clean Catch (Midstream)  Final Report (04-27-21 @ 10:57):    <10,000 CFU/mL Normal Urogenital Tricia    Culture - Blood (collected 04-26-21 @ 00:31)  Source: .Blood Blood  Gram Stain (04-27-21 @ 01:48):    Growth in aerobic bottle: Gram Positive Cocci in Clusters  Final Report (04-28-21 @ 16:38):    Growth in aerobic bottle: Staphylococcus aureus    ***Blood Panel PCR results on this specimen are available    approximately 3 hours after the Gram stain result.***    Gram stain, PCR, and/or culture results may not always    correspond due to difference in methodologies.    ************************************************************    This PCR assay was performed by multiplex PCR. This    Assay tests for 66 bacterial and resistance gene targets.    Please refer to the [facility] [employer] test directory    at [email or web address] for details.  Organism: Blood Culture PCR  Staphylococcus aureus (04-28-21 @ 16:38)  Organism: Staphylococcus aureus (04-28-21 @ 16:38)      -  Ampicillin/Sulbactam: S <=8/4      -  Cefazolin: S <=4      -  Clindamycin: R 0.5 This isolate is presumed to be clindamycin resistant based on detection of inducible resistance. Clindamycin may still be effective in some patients.      -  Erythromycin: R >4      -  Gentamicin: S <=1 Should not be used as monotherapy      -  Oxacillin: S <=0.25      -  Penicillin: R 4      -  RIF- Rifampin: S <=1 Should not be used as monotherapy      -  Tetra/Doxy: S <=1      -  Trimethoprim/Sulfamethoxazole: S <=0.5/9.5      -  Vancomycin: S 2      Method Type: YOLIE  Organism: Blood Culture PCR (04-28-21 @ 16:38)      -  Staphylococcus aureus: Detec Any isolate of Staphylococcus aureus from a blood culture is NOT considered a contaminant.      Method Type: PCR    Culture - Blood (collected 04-26-21 @ 00:31)  Source: .Blood Blood  Gram Stain (04-27-21 @ 12:53):    Growth in aerobic bottle: Gram Positive Cocci in Clusters    Growth in anaerobic bottle: Gram Positive Cocci in Clusters  Final Report (04-28-21 @ 16:39):    Growth in aerobic and anaerobic bottles: Staphylococcus aureus    See previous culture [id number]            INFECTIOUS DISEASES TESTING  COVID-19 PCR: NotDetec (05-02-21 @ 08:52)  COVID-19 PCR: Detected (04-29-21 @ 14:33)  COVID-19 PCR: NotDetec (04-26-21 @ 06:00)  COVID-19 PCR: NotDetec (03-12-21 @ 11:00)  COVID-19 PCR: Detected (02-05-21 @ 13:47)  COVID-19 PCR: NotDetec (10-13-20 @ 09:08)  COVID-19 PCR: NotDetec (10-03-20 @ 19:54)  COVID-19 PCR: NotDetec (09-01-20 @ 20:40)  COVID-19 PCR: NotDetec (07-30-20 @ 07:43)  COVID-19 PCR: NotDetec (07-16-20 @ 19:46)  COVID-19 PCR: NotDetec (05-17-20 @ 10:18)      INFLAMMATORY MARKERS  Sedimentation Rate, Erythrocyte: 116 mm/Hr (05-05-21 @ 05:12)  C-Reactive Protein, Serum: 116 mg/L (05-05-21 @ 05:12)  Sedimentation Rate, Erythrocyte: 69 mm/Hr (05-04-21 @ 05:33)  C-Reactive Protein, Serum: 102 mg/L (05-04-21 @ 05:33)      RADIOLOGY & ADDITIONAL TESTS:  I have personally reviewed the last available Chest xray  CXR      CT      CARDIOLOGY TESTING  12 Lead ECG:   Ventricular Rate 91 BPM    Atrial Rate 91 BPM    P-R Interval 168 ms    QRS Duration 162 ms    Q-T Interval 380 ms    QTC Calculation(Bazett) 467 ms    P Axis 51 degrees    R Axis -27 degrees    T Axis 134 degrees    Diagnosis Line Atrial-sensed ventricular-paced rhythm  Abnormal ECG    Confirmed by Ze Beebe ([id number]) on 4/28/2021 7:36:41 AM (04-27-21 @ 22:06)      MEDICATIONS  ascorbic acid 500 Oral daily  aspirin enteric coated 81 Oral daily  atorvastatin 80 Oral at bedtime  chlorhexidine 4% Liquid 1 Topical <User Schedule>  dextrose 40% Gel 15 Oral once  dextrose 5%. 1000 IV Continuous <Continuous>  dextrose 5%. 1000 IV Continuous <Continuous>  dextrose 50% Injectable 25 IV Push once  dextrose 50% Injectable 12.5 IV Push once  dextrose 50% Injectable 25 IV Push once  digoxin     Tablet 125 Oral daily  DULoxetine 90 Oral daily  enoxaparin Injectable 40 SubCutaneous daily  gabapentin 100 Oral every 8 hours  glucagon  Injectable 1 IntraMuscular once  metoprolol succinate ER 25 Oral daily  morphine ER Tablet 15 Oral two times a day  multivitamin/minerals 1 Oral daily  nafcillin  IVPB 2 IV Intermittent every 4 hours  nafcillin  IVPB     pantoprazole    Tablet 40 Oral before breakfast  polyethylene glycol 3350 17 Oral two times a day  prasugrel 10 Oral daily  senna 2 Oral at bedtime  spironolactone 25 Oral daily      WEIGHT  Weight (kg): 81 (05-05-21 @ 13:12)      ANTIBIOTICS:  nafcillin  IVPB 2 Gram(s) IV Intermittent every 4 hours  nafcillin  IVPB          All available historical records have been reviewed

## 2021-05-10 NOTE — PROGRESS NOTE ADULT - SUBJECTIVE AND OBJECTIVE BOX
Patient examined in bed. Patient remains alert and oriented with pain adequately controlled with current pain management. Encouraged him to continue to ask for prescribed pain medication if break through pain. Only complaint is left knee pain with ROM     Patient examined in bed. Patient remains alert and oriented with pain adequately controlled with current pain management. Encouraged him to continue to ask for prescribed pain medication for break through pain. Only complaint is left knee pain with ROM

## 2021-05-10 NOTE — PROGRESS NOTE ADULT - ASSESSMENT
This isASSESSMENT  65 year old male with PMH of CAD s/p MI, PCI/CABG, CHFrEF (15-20%), has ICD, HTN, celiac disease, DM, neuropathy, chronic b/l LE ulcers presents, severe chronic pain,  hx infected R TKR with MRSA (was eventually cemented so has limited ROM R knee), and history of cholecystostomy tube placement in February 2020 for acute cholecystitis s/p removal in May 2020 with multiple presentations including persistent bilious drainage from the prior tract site as well as a RUQ abdominal abscess at the site s/p drainage who presents with left knee pain    IMPRESSION  #Septic Arthritis of left knee with MSSA bacteremia  - s/p arthrocentesis of left knee - consistent with baterial septic infection   - s/p OR washout 4/26 -- purulent fluid in left knee with significant tricompartmental arthritis   - Blood Cx 4/26 MSSA  - OR Cx 4/26 MSSA  - Blood Cx 4/27 NG, 4/28 growing Staph   - Blood Cx 4/30-5/4 NG  - JOHN PAUL 5/5: no evidence of valvular or lead vegation; noted mild, non-mobile atheroma seen in the thoracic aorta.   - MR Knee w/wo IV Cont, Left (05.06.21 @ 19:05): Septic arthritis of the knee with osteomyelitis in the distal femur and proximal tibia. Associated large joint effusion with extensive synovitis. Noadditional collection identified.  - s/p knee tap 5/9 -- 15 cc of blood fluid; WBC [id number] (95% PMN)    #Biliary Drainage from previous perc sudhir site  - Previous Intraabdominal 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 Decreased area of right upper quadrant subcutaneous stranding at site of prior percutaneous cholecystostomy, without evidence of abscess.  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    #CHF s/p ICD  #DM   #Abx allergy: carvedilol (Other)  enalapril (Hives)  Entresto (Other)    Creatinine, Serum: 1.0 mg/dL (04.25.21 @ 22:15)  Weight (kg): 81 (26 Apr 2021 17:23)    RECOMMENDATIONS  - nafcillin 2g q 4 hours  - serial artherocentesis per ortho -- WBC in fluid still high, downtrending   - pain control per primary    Please call or message on [employer] Teams if with any questions.  Spectra [phone]

## 2021-05-10 NOTE — PROGRESS NOTE ADULT - ATTENDING COMMENTS
Pt. seen/ examined  Labs/ vitals reviewed  Decrease in WBC appreciated  Discussed details of plan of care with patient and wife  Will repeat aspiration tomorrow

## 2021-05-10 NOTE — PROGRESS NOTE ADULT - SUBJECTIVE AND OBJECTIVE BOX
discussed case with attending cell count decreased 76k   no surgery today   will continue to monitor and will probably aspirate again tomorrow   discussed with rn and hospitalist

## 2021-05-11 LAB
ANION GAP SERPL CALC-SCNC: 10 MMOL/L — SIGNIFICANT CHANGE UP (ref 7–14)
BUN SERPL-MCNC: 15 MG/DL — SIGNIFICANT CHANGE UP (ref 10–20)
CALCIUM SERPL-MCNC: 8.3 MG/DL — LOW (ref 8.5–10.1)
CHLORIDE SERPL-SCNC: 96 MMOL/L — LOW (ref 98–110)
CO2 SERPL-SCNC: 29 MMOL/L — SIGNIFICANT CHANGE UP (ref 17–32)
CREAT SERPL-MCNC: 0.9 MG/DL — SIGNIFICANT CHANGE UP (ref 0.7–1.5)
CRP SERPL-MCNC: 108 MG/L — HIGH
ERYTHROCYTE [SEDIMENTATION RATE] IN BLOOD: 106 MM/HR — HIGH (ref 0–10)
GLUCOSE BLDC GLUCOMTR-MCNC: 246 MG/DL — HIGH (ref 70–99)
GLUCOSE BLDC GLUCOMTR-MCNC: 278 MG/DL — HIGH (ref 70–99)
GLUCOSE BLDC GLUCOMTR-MCNC: 279 MG/DL — HIGH (ref 70–99)
GLUCOSE BLDC GLUCOMTR-MCNC: 332 MG/DL — HIGH (ref 70–99)
GLUCOSE SERPL-MCNC: 209 MG/DL — HIGH (ref 70–99)
HCT VFR BLD CALC: 27.2 % — LOW (ref 42–52)
HGB BLD-MCNC: 8 G/DL — LOW (ref 14–18)
MCHC RBC-ENTMCNC: 20.9 PG — LOW (ref 27–31)
MCHC RBC-ENTMCNC: 29.4 G/DL — LOW (ref 32–37)
MCV RBC AUTO: 71.2 FL — LOW (ref 80–94)
NRBC # BLD: 0 /100 WBCS — SIGNIFICANT CHANGE UP (ref 0–0)
PLATELET # BLD AUTO: 539 K/UL — HIGH (ref 130–400)
POTASSIUM SERPL-MCNC: 5.1 MMOL/L — HIGH (ref 3.5–5)
POTASSIUM SERPL-SCNC: 5.1 MMOL/L — HIGH (ref 3.5–5)
RBC # BLD: 3.82 M/UL — LOW (ref 4.7–6.1)
RBC # FLD: 20.9 % — HIGH (ref 11.5–14.5)
SODIUM SERPL-SCNC: 135 MMOL/L — SIGNIFICANT CHANGE UP (ref 135–146)
WBC # BLD: 10.16 K/UL — SIGNIFICANT CHANGE UP (ref 4.8–10.8)
WBC # FLD AUTO: 10.16 K/UL — SIGNIFICANT CHANGE UP (ref 4.8–10.8)

## 2021-05-11 PROCEDURE — 99232 SBSQ HOSP IP/OBS MODERATE 35: CPT

## 2021-05-11 RX ORDER — INSULIN LISPRO 100/ML
10 VIAL (ML) SUBCUTANEOUS
Refills: 0 | Status: DISCONTINUED | OUTPATIENT
Start: 2021-05-11 | End: 2021-05-13

## 2021-05-11 RX ORDER — INSULIN LISPRO 100/ML
VIAL (ML) SUBCUTANEOUS
Refills: 0 | Status: DISCONTINUED | OUTPATIENT
Start: 2021-05-11 | End: 2021-05-27

## 2021-05-11 RX ORDER — MORPHINE SULFATE 50 MG/1
15 CAPSULE, EXTENDED RELEASE ORAL
Refills: 0 | Status: DISCONTINUED | OUTPATIENT
Start: 2021-05-11 | End: 2021-05-18

## 2021-05-11 RX ORDER — INSULIN GLARGINE 100 [IU]/ML
30 INJECTION, SOLUTION SUBCUTANEOUS AT BEDTIME
Refills: 0 | Status: DISCONTINUED | OUTPATIENT
Start: 2021-05-11 | End: 2021-05-14

## 2021-05-11 RX ORDER — NYSTATIN CREAM 100000 [USP'U]/G
1 CREAM TOPICAL
Refills: 0 | Status: DISCONTINUED | OUTPATIENT
Start: 2021-05-11 | End: 2021-05-27

## 2021-05-11 RX ORDER — MORPHINE SULFATE 50 MG/1
6 CAPSULE, EXTENDED RELEASE ORAL EVERY 4 HOURS
Refills: 0 | Status: DISCONTINUED | OUTPATIENT
Start: 2021-05-11 | End: 2021-05-14

## 2021-05-11 RX ADMIN — NAFCILLIN 200 GRAM(S): 10 INJECTION, POWDER, FOR SOLUTION INTRAVENOUS at 14:29

## 2021-05-11 RX ADMIN — MORPHINE SULFATE 6 MILLIGRAM(S): 50 CAPSULE, EXTENDED RELEASE ORAL at 04:22

## 2021-05-11 RX ADMIN — NYSTATIN CREAM 1 APPLICATION(S): 100000 CREAM TOPICAL at 06:05

## 2021-05-11 RX ADMIN — Medication 125 MICROGRAM(S): at 06:04

## 2021-05-11 RX ADMIN — MORPHINE SULFATE 6 MILLIGRAM(S): 50 CAPSULE, EXTENDED RELEASE ORAL at 18:26

## 2021-05-11 RX ADMIN — SPIRONOLACTONE 25 MILLIGRAM(S): 25 TABLET, FILM COATED ORAL at 06:04

## 2021-05-11 RX ADMIN — DULOXETINE HYDROCHLORIDE 90 MILLIGRAM(S): 30 CAPSULE, DELAYED RELEASE ORAL at 11:05

## 2021-05-11 RX ADMIN — NAFCILLIN 200 GRAM(S): 10 INJECTION, POWDER, FOR SOLUTION INTRAVENOUS at 06:04

## 2021-05-11 RX ADMIN — ATORVASTATIN CALCIUM 80 MILLIGRAM(S): 80 TABLET, FILM COATED ORAL at 21:21

## 2021-05-11 RX ADMIN — SENNA PLUS 2 TABLET(S): 8.6 TABLET ORAL at 21:22

## 2021-05-11 RX ADMIN — MORPHINE SULFATE 6 MILLIGRAM(S): 50 CAPSULE, EXTENDED RELEASE ORAL at 14:28

## 2021-05-11 RX ADMIN — GABAPENTIN 100 MILLIGRAM(S): 400 CAPSULE ORAL at 21:21

## 2021-05-11 RX ADMIN — Medication 3: at 11:38

## 2021-05-11 RX ADMIN — GABAPENTIN 100 MILLIGRAM(S): 400 CAPSULE ORAL at 14:29

## 2021-05-11 RX ADMIN — Medication 4: at 16:45

## 2021-05-11 RX ADMIN — PANTOPRAZOLE SODIUM 40 MILLIGRAM(S): 20 TABLET, DELAYED RELEASE ORAL at 06:04

## 2021-05-11 RX ADMIN — PRASUGREL 10 MILLIGRAM(S): 5 TABLET, FILM COATED ORAL at 11:05

## 2021-05-11 RX ADMIN — Medication 25 MILLIGRAM(S): at 06:04

## 2021-05-11 RX ADMIN — INSULIN GLARGINE 30 UNIT(S): 100 INJECTION, SOLUTION SUBCUTANEOUS at 22:25

## 2021-05-11 RX ADMIN — MORPHINE SULFATE 6 MILLIGRAM(S): 50 CAPSULE, EXTENDED RELEASE ORAL at 09:45

## 2021-05-11 RX ADMIN — MORPHINE SULFATE 6 MILLIGRAM(S): 50 CAPSULE, EXTENDED RELEASE ORAL at 14:43

## 2021-05-11 RX ADMIN — NAFCILLIN 200 GRAM(S): 10 INJECTION, POWDER, FOR SOLUTION INTRAVENOUS at 21:20

## 2021-05-11 RX ADMIN — Medication 500 MILLIGRAM(S): at 11:05

## 2021-05-11 RX ADMIN — NAFCILLIN 200 GRAM(S): 10 INJECTION, POWDER, FOR SOLUTION INTRAVENOUS at 01:31

## 2021-05-11 RX ADMIN — Medication 81 MILLIGRAM(S): at 11:05

## 2021-05-11 RX ADMIN — ENOXAPARIN SODIUM 40 MILLIGRAM(S): 100 INJECTION SUBCUTANEOUS at 11:06

## 2021-05-11 RX ADMIN — MORPHINE SULFATE 6 MILLIGRAM(S): 50 CAPSULE, EXTENDED RELEASE ORAL at 09:57

## 2021-05-11 RX ADMIN — POLYETHYLENE GLYCOL 3350 17 GRAM(S): 17 POWDER, FOR SOLUTION ORAL at 06:05

## 2021-05-11 RX ADMIN — Medication 1 TABLET(S): at 11:05

## 2021-05-11 RX ADMIN — MORPHINE SULFATE 6 MILLIGRAM(S): 50 CAPSULE, EXTENDED RELEASE ORAL at 18:44

## 2021-05-11 RX ADMIN — NAFCILLIN 200 GRAM(S): 10 INJECTION, POWDER, FOR SOLUTION INTRAVENOUS at 09:47

## 2021-05-11 RX ADMIN — GABAPENTIN 100 MILLIGRAM(S): 400 CAPSULE ORAL at 06:04

## 2021-05-11 RX ADMIN — NYSTATIN CREAM 1 APPLICATION(S): 100000 CREAM TOPICAL at 17:25

## 2021-05-11 RX ADMIN — MORPHINE SULFATE 15 MILLIGRAM(S): 50 CAPSULE, EXTENDED RELEASE ORAL at 18:45

## 2021-05-11 RX ADMIN — MORPHINE SULFATE 15 MILLIGRAM(S): 50 CAPSULE, EXTENDED RELEASE ORAL at 18:26

## 2021-05-11 RX ADMIN — NAFCILLIN 200 GRAM(S): 10 INJECTION, POWDER, FOR SOLUTION INTRAVENOUS at 17:25

## 2021-05-11 NOTE — PROGRESS NOTE ADULT - SUBJECTIVE AND OBJECTIVE BOX
Patient is a 65y old  Male who presents with a chief complaint of Septic arthritis (05 May 2021 07:23)      S  Crying with pain in knee and other frustrations  has lost his insulin pump         Vital Signs Last 24 Hrs  T(C): 36.8 (05 May 2021 12:00), Max: 36.8 (04 May 2021 21:35)  T(F): 98.3 (05 May 2021 12:00), Max: 98.3 (04 May 2021 21:35)  HR: 77 (05 May 2021 12:00) (77 - 79)  BP: 101/56 (05 May 2021 12:00) (101/56 - 118/62)  BP(mean): --  RR: 16 (05 May 2021 12:00) (16 - 18)  SpO2: --    PHYSICAL EXAM:   NAD; Normocephalic;   LUNGS - no wheezing  HEART: S1 S2+   ABDOMEN: Soft, Nontender, non distended  EXTREMITIES: no cyanosis; no edema L knee dressed   NERVOUS SYSTEM:  Awake and alert; no focal neuro deficits appreciated      LABS:                        8.0    10.62 )-----------( 374      ( 05 May 2021 05:12 )             27.3     05-05    135  |  99  |  17  ----------------------------<  346<H>  4.3   |  24  |  0.9    Ca    8.2<L>      05 May 2021 05:12  Mg     2.0     05-04    TPro  6.3  /  Alb  2.7<L>  /  TBili  0.5  /  DBili  x   /  AST  12  /  ALT  14  /  AlkPhos  95  05-05        CAPILLARY BLOOD GLUCOSE

## 2021-05-11 NOTE — PROGRESS NOTE ADULT - SUBJECTIVE AND OBJECTIVE BOX
Pt. seen/ examined  Labs/Vitals reviewed  L knee effusion increased slightly compared with yesterday  Wounds C/D/I  No erythema  Significant pain w/ attempted ROM

## 2021-05-11 NOTE — CONSULT NOTE ADULT - SUBJECTIVE AND OBJECTIVE BOX
Patient is a 65y old  Male who presents with a chief complaint of Septic arthritis (11 May 2021 12:08)    HPI:  65 year old male with PMH of CAD s/p MI, PCI/CABG, CHFrEF (15-20%), has ICD, HTN, celiac disease, DM, neuropathy, chronic b/l LE ulcers presents, severe chronic pain,  hx infected R TKR with MRSA (was eventually cemented so has limited ROM R knee), and history of cholecystostomy tube placement in February 2020 for acute cholecystitis s/p removal in May 2020 with multiple presentations including persistent bilious drainage from the prior tract site as well as a RUQ abdominal abscess at the site s/p drainage. He presented to ED with inability to walk/ambulate 2/2 left knee pain. Pt has hx of knee pain and infections. 2 weeks ago pt received a 'steroid' shot for his left knee for pain. 2 days later, his knee began to swell and he was unable to walk or bend the knee. It progressively gotten worse to the point where the pain was unbearable so he presented to the ED.     Pt endorses a weight loss >70 lbs over the last year, there is bilious drainage coming from where he had a prior per sudhir, jaundice in the finger tips and sclera. Recent admission for uncontrollable movements, no diagnosis given. Pt denies f/c/n/v, chest pain, headaches, UTI symptoms     In the ED:  Joint was aspirated cloudy yellow fluid was seen and sent for cultures. Neutrophil count >50,000 and RBC >8000. Ortho consulted for septic arthritis, s/p arthroscopic drainage. Surg consulted for bile drainage, HIDA scan, consult GI, possible ERCP. CT A&P, No evidence of acute intra-abdominal pathology. Decreased area of right upper quadrant subcutaneous stranding at site of prior percutaneous cholecystostomy, without evidence of abscess. Vitally stable. Admitted to medicine for medical management.    The patient is being followed by Neurology, and he is followed by Cardiology Dr. Lopez in  and Dr. Lilia Caldera in [facility], also by Endo Dr. Cormier at [facility].     (26 Apr 2021 11:34)      FAMILY HISTORY:  Family history of heart disease (Mother)    Family history of allergies  daughter      PAST MEDICAL & SURGICAL HISTORY:  Status post percutaneous transluminal coronary angioplasty  2 stents    Atherosclerosis of coronary artery  CAD (coronary artery disease)    Osteoarthritis    HLD (hyperlipidemia)    Blood clot due to device, implant, or graft  was on blood thinners    Diabetes  on insulin pump    HTN (hypertension)    CHF (congestive heart failure)  EF ~ 25%    History of celiac disease    Diverticulitis    STEMI (ST elevation myocardial infarction)    Diabetic neuropathy    Anxiety and depression    Other postprocedural status  Fixation hardware in foot LEFT    Stented coronary artery  10/18 heart attack  INFERIOR WALL MI    S/P CABG x 1  2018    S/P TKR (total knee replacement), right  with infection Mrsa   per pt he was cleared from MRSA infection    Surgery, elective  right knee wound debridement    History of open reduction and internal fixation (ORIF) procedure    H/O shoulder surgery  right    AICD (automatic cardioverter/defibrillator) present    Cholecystostomy care  drain inserted 2020 &amp; removed 4 months ago    History of tonsillectomy    History of hip replacement, total, right        Allergies    ACE inhibitors (Hives)  carvedilol (Other)  enalapril (Hives)  Entresto (Other)    Intolerances      MEDICATIONS  (STANDING):  ascorbic acid 500 milliGRAM(s) Oral daily  aspirin enteric coated 81 milliGRAM(s) Oral daily  atorvastatin 80 milliGRAM(s) Oral at bedtime  chlorhexidine 4% Liquid 1 Application(s) Topical <User Schedule>  dextrose 40% Gel 15 Gram(s) Oral once  dextrose 5%. 1000 milliLiter(s) (50 mL/Hr) IV Continuous <Continuous>  dextrose 5%. 1000 milliLiter(s) (100 mL/Hr) IV Continuous <Continuous>  dextrose 50% Injectable 25 Gram(s) IV Push once  dextrose 50% Injectable 12.5 Gram(s) IV Push once  dextrose 50% Injectable 25 Gram(s) IV Push once  digoxin     Tablet 125 MICROGram(s) Oral daily  DULoxetine 90 milliGRAM(s) Oral daily  enoxaparin Injectable 40 milliGRAM(s) SubCutaneous daily  gabapentin 100 milliGRAM(s) Oral every 8 hours  glucagon  Injectable 1 milliGRAM(s) IntraMuscular once  insulin lispro (ADMELOG) corrective regimen sliding scale   SubCutaneous three times a day before meals  metoprolol succinate ER 25 milliGRAM(s) Oral daily  multivitamin/minerals 1 Tablet(s) Oral daily  nafcillin  IVPB 2 Gram(s) IV Intermittent every 4 hours  nafcillin  IVPB      nystatin Cream 1 Application(s) Topical two times a day  pantoprazole    Tablet 40 milliGRAM(s) Oral before breakfast  polyethylene glycol 3350 17 Gram(s) Oral two times a day  prasugrel 10 milliGRAM(s) Oral daily  senna 2 Tablet(s) Oral at bedtime  spironolactone 25 milliGRAM(s) Oral daily    MEDICATIONS  (PRN):  acetaminophen   Tablet .. 650 milliGRAM(s) Oral every 6 hours PRN Temp greater or equal to 38C (100.4F), Mild Pain (1 - 3)  furosemide    Tablet 40 milliGRAM(s) Oral daily PRN fluid overload  melatonin 10 milliGRAM(s) Oral at bedtime PRN Insomnia  morphine  - Injectable 6 milliGRAM(s) IV Push every 4 hours PRN Severe Pain (7 - 10)      Vital Signs Last 24 Hrs  T(C): 35.6 (11 May 2021 14:23), Max: 36.6 (10 May 2021 20:32)  T(F): 96.1 (11 May 2021 14:23), Max: 97.9 (10 May 2021 20:32)  HR: 73 (11 May 2021 14:23) (73 - 86)  BP: 114/58 (11 May 2021 14:23) (114/58 - 117/58)  BP(mean): --  RR: 18 (11 May 2021 14:23) (18 - 18)  SpO2: --      PHYSICAL EXAM  GENERAL: NAD  HEENT: NCAT  CHEST/LUNG: CTAB  HEART: Regular rate and rhythm; s1 s2 appreciated, No murmurs, rubs, or gallops  ABDOMEN: Soft, Nontender, Nondistended; Bowel sounds present  EXTREMITIES: No LE edema b/l  SKIN: no rashes, no new lesions  NERVOUS SYSTEM:  Alert & Oriented X3  LINES/CATHETERS:        LABS                          8.0    10.16 )-----------( 539      ( 11 May 2021 07:50 )             27.2     05-11    135  |  96<L>  |  15  ----------------------------<  209<H>  5.1<H>   |  29  |  0.9    Ca    8.3<L>      11 May 2021 07:50          CAPILLARY BLOOD GLUCOSE      POCT Blood Glucose.: 279 mg/dL (11 May 2021 11:25)  POCT Blood Glucose.: 278 mg/dL (11 May 2021 08:36)

## 2021-05-11 NOTE — PROGRESS NOTE ADULT - ASSESSMENT
Again discussed serial aspirations treatment plan  After discussion of risks/ benefits/ alternatives, L knee aspirated under strict sterile conditions  Arthrocentesis yielded 5 ml bloody fluid  Sent cell count, Gram stain, cultures  Will follow - if worsening clinical picture/ WBC count, may proceed with repeat I&D

## 2021-05-11 NOTE — CONSULT NOTE ADULT - ASSESSMENT
ASSESSMENT:  Insulin dependent DM currently missing pump  septic L knee   Gallbladder infection      - was on 2U/hr on pump per last note  - order weight based basal-bolus insulin protocol if insulin pump is unable to be found  - continue with corrective scale  - if unable to find pump, patient will be have to d/c with basal-bolus pens until can follow up with outpatient endocrinologist      Thank you for the consult, reconsult as appropriate.     *** THIS IS A PRELIM NOTE, PENDING FINAL RECS WITH ATTENDING *** ASSESSMENT:  Insulin dependent DM currently missing pump  septic L knee   Gallbladder infection      - per prior endo notes, was on 1.7U/hr on pump (per pt was on 3U?)  - as insulin pump is unable to be found start basal 30 U lantus + 10 lispro with meals   - adjust by 10-15% based on finger sticks to target finger stick 140-180  - continue with corrective scale  - if unable to find pump, patient will be have to d/c with basal-bolus pens, pen needles, glucometer/strips/lancets until can follow up with outpatient endocrinologist  - Needs to return to Dr. Cormier on dispo to discuss replacement pump therapy      Thank you for the consult, reconsult as appropriate.   Case discussed with attending Dr. Fisher.     *** THIS IS A PRELIM NOTE, PENDING FINAL RECS WITH ATTENDING *** ASSESSMENT:  Insulin dependent DM currently missing pump  septic L knee   Gallbladder infection      - per prior endo notes, was on 1.7U/hr on pump (per pt was on 3U?)  - as insulin pump is unable to be found start basal 30 U lantus + 10 lispro with meals   - adjust by 10-15% based on finger sticks to target finger stick 140-180  - continue with corrective scale  - if unable to find pump, patient will be have to d/c with basal-bolus pens, pen needles, glucometer/strips/lancets until can follow up with outpatient endocrinologist  - Needs to return to Dr. Cormier on dispo to discuss replacement pump therapy      Thank you for the consult, reconsult as appropriate.   Case discussed with attending Dr. Fisher.

## 2021-05-11 NOTE — PROGRESS NOTE ADULT - ASSESSMENT
A/P:-  Pt is seen and evaluated by bedside.     1. Septic arthritis of Left knee with severe synovitis, s/p washout with recurrent effusion, s/p arthrocentesis 05/11    2. 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 MSSA bacteremia   4. 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 Chronic iron deficiency anemia with component of ACD  6. DMII   7. Chronic HFrEF s/p AICD   8. CAD    - XR bilateral knees (04/26) large joint effusion in left knee  - s/p arthroscopic drainage of left knee on 04/26 and OR washout 4/26 - 20mL purulent fluid in left knee -  - Synovial Culture (04/26) MSSA  s/p  5/11 Arthrocentesis yielded 5 ml bloody fluid  - Blood Culture (04/26) x2, 04/27 NGTD, 04/28 x2 SJ, 04/30-05/01 NGTD, 05/02 and 05/03 received  - JOHN PAUL  - no endocarditis noted   - currently on nefcillin 2 q4 -->plan to discharge on cefazolin 2 q8 as per ID for total of 6 weeks   - needs repeat MRI of knee for posterior collection   - continue with pain control   - History of acute cholecystitis s/p cholecystostomy tube placement in February 2020 s/p removal in May 2020, with fistula formation and persistent bilious drainage from the prior tract site as well as a RUQ abdominal abscess at the site s/p drainage  - Previous Intraabdominal 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 Decreased area of right upper quadrant subcutaneous stranding at site of prior percutaneous cholecystostomy, without evidence of abscess.  - HIDA scan 05/02: cannot visualize GB  - as per advance GI Dr. Dixon outpatient ERCP and cholecystectomy to divert bile and heal fistula, recommended vac wound  - no further intervention as per IR and surgery   - hb level stable at this time - no active bleeding noted   - Last a1c 9.1 04/29  - off insulin pump, as he has lost it   - Endocrinology reconsulted, Basal bolus insulin ordered     - EP on board: s/p interrogation of AICD 04/28 (normally functioning)  - Cardiology Dr Nelson is on board  - Continue Lasix PO 40mg PRN and aldactone 25mg QD   - Continue Digoxin 125mg QD  - Continue Aspirin 81mg/ Prasugrel 10mg, Rosuvastatin 40mg and toprol

## 2021-05-11 NOTE — PROGRESS NOTE ADULT - SUBJECTIVE AND OBJECTIVE BOX
SUBJ: Patient seen and examined .Still complains of sever knee pain .       MEDICATIONS  (STANDING):  ascorbic acid 500 milliGRAM(s) Oral daily  aspirin enteric coated 81 milliGRAM(s) Oral daily  atorvastatin 80 milliGRAM(s) Oral at bedtime  chlorhexidine 4% Liquid 1 Application(s) Topical <User Schedule>  dextrose 40% Gel 15 Gram(s) Oral once  dextrose 5%. 1000 milliLiter(s) (50 mL/Hr) IV Continuous <Continuous>  dextrose 5%. 1000 milliLiter(s) (100 mL/Hr) IV Continuous <Continuous>  dextrose 50% Injectable 25 Gram(s) IV Push once  dextrose 50% Injectable 12.5 Gram(s) IV Push once  dextrose 50% Injectable 25 Gram(s) IV Push once  digoxin     Tablet 125 MICROGram(s) Oral daily  DULoxetine 90 milliGRAM(s) Oral daily  enoxaparin Injectable 40 milliGRAM(s) SubCutaneous daily  gabapentin 100 milliGRAM(s) Oral every 8 hours  glucagon  Injectable 1 milliGRAM(s) IntraMuscular once  insulin lispro (ADMELOG) corrective regimen sliding scale   SubCutaneous three times a day before meals  metoprolol succinate ER 25 milliGRAM(s) Oral daily  multivitamin/minerals 1 Tablet(s) Oral daily  nafcillin  IVPB 2 Gram(s) IV Intermittent every 4 hours  nafcillin  IVPB      nystatin Cream 1 Application(s) Topical two times a day  pantoprazole    Tablet 40 milliGRAM(s) Oral before breakfast  polyethylene glycol 3350 17 Gram(s) Oral two times a day  prasugrel 10 milliGRAM(s) Oral daily  senna 2 Tablet(s) Oral at bedtime  spironolactone 25 milliGRAM(s) Oral daily    MEDICATIONS  (PRN):  acetaminophen   Tablet .. 650 milliGRAM(s) Oral every 6 hours PRN Temp greater or equal to 38C (100.4F), Mild Pain (1 - 3)  furosemide    Tablet 40 milliGRAM(s) Oral daily PRN fluid overload  melatonin 10 milliGRAM(s) Oral at bedtime PRN Insomnia  morphine  - Injectable 6 milliGRAM(s) IV Push every 4 hours PRN Severe Pain (7 - 10)            Vital Signs Last 24 Hrs  T(C): 35.6 (11 May 2021 14:23), Max: 36.6 (10 May 2021 20:32)  T(F): 96.1 (11 May 2021 14:23), Max: 97.9 (10 May 2021 20:32)  HR: 73 (11 May 2021 14:23) (73 - 86)  BP: 114/58 (11 May 2021 14:23) (114/58 - 117/58)  BP(mean): --  RR: 18 (11 May 2021 14:23) (18 - 18)  SpO2: --     REVIEW OF SYSTEMS:  CONSTITUTIONAL: No fever  NECK: No pain or stiffness  CARDIOVASCULAR: patient denies chest pain, shortness of breath or palpitations .  Repiratory: No cough or wheezing.  NEUROLOGICAL: No focal deficits to report.  GI: no BRBPR, no N,V,diarrhea.    PSYCHIATRY: normal mood and affect  HEENT: no nasal discharge, no ecchymosis      PHYSICAL EXAM:  GENERAL: NAD  HEAD:  Atraumatic, Normocephalic  NECK: Supple, No JVD  NERVOUS SYSTEM:  Alert & Oriented X3, Good concentration  CHEST/LUNG: Clear to anterior auscultation bilaterally  HEART: Regular rate and rhythm  ABDOMEN: Soft, Nontender, Nondistended;       LABS:                        8.0    10.16 )-----------( 539      ( 11 May 2021 07:50 )             27.2     05-11    135  |  96<L>  |  15  ----------------------------<  209<H>  5.1<H>   |  29  |  0.9    Ca    8.3<L>      11 May 2021 07:50              I&O's Summary    10 May 2021 07:01  -  11 May 2021 07:00  --------------------------------------------------------  IN: 0 mL / OUT: 0 mL / NET: 0 mL    11 May 2021 07:01  -  11 May 2021 15:11  --------------------------------------------------------  IN: 0 mL / OUT: 650 mL / NET: -650 mL      BNP  RADIOLOGY & ADDITIONAL STUDIES:    IMPRESSION AND PLAN:    CAD and ICM   Septic Knee. MRI showed left knee effusion   MSSA bacteremia. JOHN PAUL showed no evidence of endocarditis    MRI showed residual knee effusion  - Management as per primary team  - Euvolemic continue to monitor I&O  - Continue ASA and Prasugrel   - Will follow

## 2021-05-12 LAB
ANION GAP SERPL CALC-SCNC: 10 MMOL/L — SIGNIFICANT CHANGE UP (ref 7–14)
B PERT IGG+IGM PNL SER: ABNORMAL
BUN SERPL-MCNC: 15 MG/DL — SIGNIFICANT CHANGE UP (ref 10–20)
CALCIUM SERPL-MCNC: 8.6 MG/DL — SIGNIFICANT CHANGE UP (ref 8.5–10.1)
CHLORIDE SERPL-SCNC: 100 MMOL/L — SIGNIFICANT CHANGE UP (ref 98–110)
CO2 SERPL-SCNC: 28 MMOL/L — SIGNIFICANT CHANGE UP (ref 17–32)
COLOR FLD: SIGNIFICANT CHANGE UP
CREAT SERPL-MCNC: 0.9 MG/DL — SIGNIFICANT CHANGE UP (ref 0.7–1.5)
FLUID INTAKE SUBSTANCE CLASS: SIGNIFICANT CHANGE UP
FLUID SEGMENTED GRANULOCYTES: SIGNIFICANT CHANGE UP %
GLUCOSE BLDC GLUCOMTR-MCNC: 166 MG/DL — HIGH (ref 70–99)
GLUCOSE BLDC GLUCOMTR-MCNC: 211 MG/DL — HIGH (ref 70–99)
GLUCOSE BLDC GLUCOMTR-MCNC: 63 MG/DL — LOW (ref 70–99)
GLUCOSE BLDC GLUCOMTR-MCNC: 71 MG/DL — SIGNIFICANT CHANGE UP (ref 70–99)
GLUCOSE BLDC GLUCOMTR-MCNC: 92 MG/DL — SIGNIFICANT CHANGE UP (ref 70–99)
GLUCOSE BLDC GLUCOMTR-MCNC: 95 MG/DL — SIGNIFICANT CHANGE UP (ref 70–99)
GLUCOSE SERPL-MCNC: 209 MG/DL — HIGH (ref 70–99)
LYMPHOCYTES # FLD: SIGNIFICANT CHANGE UP
MONOS+MACROS # FLD: SIGNIFICANT CHANGE UP %
NIGHT BLUE STAIN TISS: SIGNIFICANT CHANGE UP
POTASSIUM SERPL-MCNC: 5.1 MMOL/L — HIGH (ref 3.5–5)
POTASSIUM SERPL-SCNC: 5.1 MMOL/L — HIGH (ref 3.5–5)
RCV VOL RI: HIGH /UL (ref 0–0)
SODIUM SERPL-SCNC: 138 MMOL/L — SIGNIFICANT CHANGE UP (ref 135–146)
SPECIMEN SOURCE: SIGNIFICANT CHANGE UP
TOTAL NUCLEATED CELL COUNT, BODY FLUID: SIGNIFICANT CHANGE UP /UL
TUBE TYPE: SIGNIFICANT CHANGE UP

## 2021-05-12 PROCEDURE — 99232 SBSQ HOSP IP/OBS MODERATE 35: CPT

## 2021-05-12 RX ADMIN — MORPHINE SULFATE 15 MILLIGRAM(S): 50 CAPSULE, EXTENDED RELEASE ORAL at 05:31

## 2021-05-12 RX ADMIN — SPIRONOLACTONE 25 MILLIGRAM(S): 25 TABLET, FILM COATED ORAL at 05:28

## 2021-05-12 RX ADMIN — GABAPENTIN 100 MILLIGRAM(S): 400 CAPSULE ORAL at 05:28

## 2021-05-12 RX ADMIN — Medication 1 TABLET(S): at 11:34

## 2021-05-12 RX ADMIN — GABAPENTIN 100 MILLIGRAM(S): 400 CAPSULE ORAL at 14:22

## 2021-05-12 RX ADMIN — NAFCILLIN 200 GRAM(S): 10 INJECTION, POWDER, FOR SOLUTION INTRAVENOUS at 05:27

## 2021-05-12 RX ADMIN — PRASUGREL 10 MILLIGRAM(S): 5 TABLET, FILM COATED ORAL at 11:34

## 2021-05-12 RX ADMIN — GABAPENTIN 100 MILLIGRAM(S): 400 CAPSULE ORAL at 21:34

## 2021-05-12 RX ADMIN — ATORVASTATIN CALCIUM 80 MILLIGRAM(S): 80 TABLET, FILM COATED ORAL at 21:34

## 2021-05-12 RX ADMIN — Medication 10 UNIT(S): at 17:14

## 2021-05-12 RX ADMIN — Medication 25 MILLIGRAM(S): at 05:28

## 2021-05-12 RX ADMIN — NAFCILLIN 200 GRAM(S): 10 INJECTION, POWDER, FOR SOLUTION INTRAVENOUS at 17:14

## 2021-05-12 RX ADMIN — Medication 500 MILLIGRAM(S): at 11:34

## 2021-05-12 RX ADMIN — NYSTATIN CREAM 1 APPLICATION(S): 100000 CREAM TOPICAL at 17:16

## 2021-05-12 RX ADMIN — NAFCILLIN 200 GRAM(S): 10 INJECTION, POWDER, FOR SOLUTION INTRAVENOUS at 14:22

## 2021-05-12 RX ADMIN — Medication 125 MICROGRAM(S): at 05:28

## 2021-05-12 RX ADMIN — NYSTATIN CREAM 1 APPLICATION(S): 100000 CREAM TOPICAL at 05:29

## 2021-05-12 RX ADMIN — NAFCILLIN 200 GRAM(S): 10 INJECTION, POWDER, FOR SOLUTION INTRAVENOUS at 02:11

## 2021-05-12 RX ADMIN — Medication 2: at 08:00

## 2021-05-12 RX ADMIN — NAFCILLIN 200 GRAM(S): 10 INJECTION, POWDER, FOR SOLUTION INTRAVENOUS at 21:37

## 2021-05-12 RX ADMIN — DULOXETINE HYDROCHLORIDE 90 MILLIGRAM(S): 30 CAPSULE, DELAYED RELEASE ORAL at 11:34

## 2021-05-12 RX ADMIN — CHLORHEXIDINE GLUCONATE 1 APPLICATION(S): 213 SOLUTION TOPICAL at 05:28

## 2021-05-12 RX ADMIN — NAFCILLIN 200 GRAM(S): 10 INJECTION, POWDER, FOR SOLUTION INTRAVENOUS at 10:00

## 2021-05-12 RX ADMIN — MORPHINE SULFATE 15 MILLIGRAM(S): 50 CAPSULE, EXTENDED RELEASE ORAL at 17:16

## 2021-05-12 RX ADMIN — PANTOPRAZOLE SODIUM 40 MILLIGRAM(S): 20 TABLET, DELAYED RELEASE ORAL at 05:33

## 2021-05-12 RX ADMIN — Medication 81 MILLIGRAM(S): at 11:34

## 2021-05-12 RX ADMIN — SENNA PLUS 2 TABLET(S): 8.6 TABLET ORAL at 21:34

## 2021-05-12 RX ADMIN — Medication 1: at 17:14

## 2021-05-12 RX ADMIN — MORPHINE SULFATE 6 MILLIGRAM(S): 50 CAPSULE, EXTENDED RELEASE ORAL at 00:55

## 2021-05-12 RX ADMIN — ENOXAPARIN SODIUM 40 MILLIGRAM(S): 100 INJECTION SUBCUTANEOUS at 11:34

## 2021-05-12 RX ADMIN — MORPHINE SULFATE 15 MILLIGRAM(S): 50 CAPSULE, EXTENDED RELEASE ORAL at 17:14

## 2021-05-12 NOTE — PROGRESS NOTE ADULT - SUBJECTIVE AND OBJECTIVE BOX
SUBJ: Patient seen and examined. Reports mild improvement in pain after aspiration.       MEDICATIONS  (STANDING):  ascorbic acid 500 milliGRAM(s) Oral daily  aspirin enteric coated 81 milliGRAM(s) Oral daily  atorvastatin 80 milliGRAM(s) Oral at bedtime  chlorhexidine 4% Liquid 1 Application(s) Topical <User Schedule>  dextrose 40% Gel 15 Gram(s) Oral once  dextrose 5%. 1000 milliLiter(s) (50 mL/Hr) IV Continuous <Continuous>  dextrose 5%. 1000 milliLiter(s) (100 mL/Hr) IV Continuous <Continuous>  dextrose 50% Injectable 25 Gram(s) IV Push once  dextrose 50% Injectable 12.5 Gram(s) IV Push once  dextrose 50% Injectable 25 Gram(s) IV Push once  digoxin     Tablet 125 MICROGram(s) Oral daily  DULoxetine 90 milliGRAM(s) Oral daily  enoxaparin Injectable 40 milliGRAM(s) SubCutaneous daily  gabapentin 100 milliGRAM(s) Oral every 8 hours  glucagon  Injectable 1 milliGRAM(s) IntraMuscular once  insulin glargine Injectable (LANTUS) 30 Unit(s) SubCutaneous at bedtime  insulin lispro (ADMELOG) corrective regimen sliding scale   SubCutaneous three times a day before meals  insulin lispro Injectable (ADMELOG) 10 Unit(s) SubCutaneous three times a day before meals  metoprolol succinate ER 25 milliGRAM(s) Oral daily  morphine ER Tablet 15 milliGRAM(s) Oral two times a day  multivitamin/minerals 1 Tablet(s) Oral daily  nafcillin  IVPB 2 Gram(s) IV Intermittent every 4 hours  nafcillin  IVPB      nystatin Cream 1 Application(s) Topical two times a day  pantoprazole    Tablet 40 milliGRAM(s) Oral before breakfast  polyethylene glycol 3350 17 Gram(s) Oral two times a day  prasugrel 10 milliGRAM(s) Oral daily  senna 2 Tablet(s) Oral at bedtime  spironolactone 25 milliGRAM(s) Oral daily    MEDICATIONS  (PRN):  acetaminophen   Tablet .. 650 milliGRAM(s) Oral every 6 hours PRN Temp greater or equal to 38C (100.4F), Mild Pain (1 - 3)  furosemide    Tablet 40 milliGRAM(s) Oral daily PRN fluid overload  melatonin 10 milliGRAM(s) Oral at bedtime PRN Insomnia  morphine  - Injectable 6 milliGRAM(s) IV Push every 4 hours PRN Severe Pain (7 - 10)            Vital Signs Last 24 Hrs  T(C): 35.9 (12 May 2021 21:51), Max: 36.8 (12 May 2021 13:40)  T(F): 96.7 (12 May 2021 21:51), Max: 98.2 (12 May 2021 13:40)  HR: 80 (12 May 2021 21:51) (73 - 82)  BP: 124/68 (12 May 2021 21:51) (102/51 - 124/68)  BP(mean): --  RR: 18 (12 May 2021 21:51) (18 - 18)  SpO2: --     REVIEW OF SYSTEMS:  CONSTITUTIONAL: No fever  NECK: No pain or stiffness  CARDIOVASCULAR: patient denies chest pain, shortness of breath or palpitations .  Repiratory: No cough or wheezing.  NEUROLOGICAL: No focal deficits to report.  GI: no BRBPR, no N,V,diarrhea.    PSYCHIATRY: normal mood and affect        PHYSICAL EXAM:  GENERAL: NAD  HEAD:  Atraumatic, Normocephalic  NECK: Supple, No JVD  NERVOUS SYSTEM:  Alert & Oriented X3, Good concentration  CHEST/LUNG: Clear to anterior auscultation bilaterally  HEART: Regular rate and rhythm  ABDOMEN: Soft, Nontender, Nondistended;         LABS:                        8.0    10.16 )-----------( 539      ( 11 May 2021 07:50 )             27.2     05-12    138  |  100  |  15  ----------------------------<  209<H>  5.1<H>   |  28  |  0.9    Ca    8.6      12 May 2021 06:18              I&O's Summary    11 May 2021 07:01  -  12 May 2021 07:00  --------------------------------------------------------  IN: 5 mL / OUT: 650 mL / NET: -645 mL    12 May 2021 07:01  -  12 May 2021 22:06  --------------------------------------------------------  IN: 240 mL / OUT: 300 mL / NET: -60 mL      BNP  RADIOLOGY & ADDITIONAL STUDIES:    IMPRESSION AND PLAN:    CAD and ischemic cardiomyopathy   Septic Knee. MRI showed left knee effusion   MSSA bacteremia. JOHN PAUL showed no evidence of endocarditis    MRI showed residual knee effusion. S/p aspiration  - Management as per primary team  - Euvolemic continue to monitor I&O/ Lasix prn  - Continue ASA and Prasugrel   - Will follow

## 2021-05-12 NOTE — PROGRESS NOTE ADULT - ATTENDING COMMENTS
Pt. seen/ examined  Pain control improved  Labs/ vitals reviewed  Sample from yesterday's aspiration compromised  Will follow

## 2021-05-12 NOTE — PROGRESS NOTE ADULT - SUBJECTIVE AND OBJECTIVE BOX
LOIDAFLOWER KLINE  65y, Male  Allergy: ACE inhibitors (Hives)  carvedilol (Other)  enalapril (Hives)  Entresto (Other)      LOS  16d    CHIEF COMPLAINT: Septic arthritis (12 May 2021 08:04)      INTERVAL EVENTS/HPI  - No acute events overnight  - T(F): , Max: 99.6 (05-11-21 @ 21:54)  - knee tap yesterday, but sample compromised  - knee pain is stable   - WBC Count: 10.16 (05-11-21 @ 07:50)     - Creatinine, Serum: 0.9 (05-12-21 @ 06:18)  Creatinine, Serum: 0.9 (05-11-21 @ 07:50)       ROS  General: Denies rigors, nightsweats  HEENT: Denies headache, rhinorrhea, sore throat, eye pain  CV: Denies CP, palpitations  PULM: Denies wheezing, hemoptysis  GI: Denies hematemesis, hematochezia, melena  : Denies discharge, hematuria  MSK: Denies arthralgias, myalgias  SKIN: Denies rash, lesions  NEURO: Denies paresthesias, weakness  PSYCH: Denies depression, anxiety    VITALS:  T(F): 98, Max: 99.6 (05-11-21 @ 21:54)  HR: 82  BP: 122/63  RR: 18Vital Signs Last 24 Hrs  T(C): 36.7 (12 May 2021 05:47), Max: 37.6 (11 May 2021 21:54)  T(F): 98 (12 May 2021 05:47), Max: 99.6 (11 May 2021 21:54)  HR: 82 (12 May 2021 05:47) (73 - 88)  BP: 122/63 (12 May 2021 05:47) (114/58 - 122/63)  BP(mean): --  RR: 18 (12 May 2021 05:47) (18 - 18)  SpO2: --    PHYSICAL EXAM:  Gen: NAD, resting in bed  HEENT: Normocephalic, atraumatic  Neck: supple, no lymphadenopathy  CV: Regular rate & regular rhythm  Lungs: decreased BS at bases, no fremitus  Abdomen: Soft, BS present  Ext: Warm, well perfused; left knee with mild swelling, tender -- able to move at joint slightly   Neuro: non focal, awake  Skin: no rash, no erythema  Lines: no phlebitis    FH: Non-contributory  Social Hx: Non-contributory    TESTS & MEASUREMENTS:                        8.0    10.16 )-----------( 539      ( 11 May 2021 07:50 )             27.2     05-12    138  |  100  |  15  ----------------------------<  209<H>  5.1<H>   |  28  |  0.9    Ca    8.6      12 May 2021 06:18      eGFR if Non African American: 91 mL/min/1.73M2 (05-12-21 @ 06:18)  eGFR if African American: 105 mL/min/1.73M2 (05-12-21 @ 06:18)          Culture - Body Fluid with Gram Stain (collected 05-09-21 @ 14:10)  Source: .Body Fluid Synovial Fluid  Gram Stain (05-09-21 @ 23:07):    Numerous polymorphonuclear leukocytes per low power field    No organisms seen per oil power field  Preliminary Report (05-10-21 @ 18:43):    No growth    Culture - Blood (collected 05-04-21 @ 05:33)  Source: .Blood None  Final Report (05-09-21 @ 18:00):    No Growth Final    Culture - Blood (collected 05-03-21 @ 07:47)  Source: .Blood None  Final Report (05-08-21 @ 18:00):    No Growth Final    Culture - Blood (collected 05-02-21 @ 06:11)  Source: .Blood None  Final Report (05-07-21 @ 18:01):    No Growth Final    Culture - Body Fluid with Gram Stain (collected 05-01-21 @ 16:43)  Source: Bile biliary drain  Gram Stain (05-02-21 @ 23:26):    Numerous polymorphonuclear leukocytes per low power field    No organisms seen per oil power field  Final Report (05-07-21 @ 17:49):    Few Pseudomonas aeruginosa    Few Enterococcus faecium (vancomycin resistant)  Organism: Pseudomonas aeruginosa  Enterococcus faecium (vancomycin resistant) (05-07-21 @ 17:49)  Organism: Enterococcus faecium (vancomycin resistant) (05-07-21 @ 17:49)      -  Ampicillin: R >8 Predicts results to ampicillin/sulbactam, amoxacillin-clavulanate and  piperacillin-tazobactam.      -  Daptomycin: SDD 4 The breakpoint for SDD (sensitive dose dependent)is based on a dosage regimen of 8-12 mg/kg administered every 24 h in adults and is intended for serious infections due to E. faecium. Consultation with an infectious diseases specialist is recommended.      -  Levofloxacin: R >4      -  Linezolid: S 1      -  Tetra/Doxy: S <=1      -  Vancomycin: R >16      Method Type: YOLIE  Organism: Pseudomonas aeruginosa (05-07-21 @ 17:49)      -  Amikacin: S <=16      -  Aztreonam: S <=4      -  Cefepime: S <=2      -  Ceftazidime: S 4      -  Ciprofloxacin: S <=0.25      -  Gentamicin: S 4      -  Imipenem: S <=1      -  Levofloxacin: S <=0.5      -  Meropenem: S <=1      -  Piperacillin/Tazobactam: S <=8      -  Tobramycin: S <=2      Method Type: YOLIE    Culture - Blood (collected 05-01-21 @ 06:49)  Source: .Blood None  Final Report (05-06-21 @ 18:00):    No Growth Final    Culture - Blood (collected 05-01-21 @ 06:49)  Source: .Blood None  Final Report (05-06-21 @ 18:00):    No Growth Final    Culture - Blood (collected 04-30-21 @ 17:37)  Source: .Blood None  Final Report (05-05-21 @ 23:00):    No Growth Final    Culture - Blood (collected 04-30-21 @ 11:15)  Source: .Blood None  Final Report (05-05-21 @ 23:00):    No Growth Final    Culture - Blood (collected 04-28-21 @ 08:13)  Source: .Blood Blood  Gram Stain (04-29-21 @ 15:21):    Growth in anaerobic bottle: Gram Positive Cocci in Clusters  Final Report (04-30-21 @ 14:03):    Growth in anaerobic bottle: Staphylococcus aureus    See previous culture [id number]    Culture - Blood (collected 04-28-21 @ 08:13)  Source: .Blood Blood  Gram Stain (04-30-21 @ 02:25):    Growth in anaerobic bottle: Gram Positive Cocci in Clusters  Final Report (04-30-21 @ 20:23):    Growth in anaerobic bottle: Staphylococcus aureus    See previous culture [id number]    Culture - Blood (collected 04-27-21 @ 06:14)  Source: .Blood None  Final Report (05-02-21 @ 13:01):    No Growth Final    Culture - Surgical Swab (collected 04-26-21 @ 08:52)  Source: .Surgical Swab None  Final Report (05-01-21 @ 17:29):    Moderate Staphylococcus aureus  Organism: Staphylococcus aureus (05-01-21 @ 17:29)  Organism: Staphylococcus aureus (05-01-21 @ 17:29)      -  Ampicillin/Sulbactam: S <=8/4      -  Cefazolin: S <=4      -  Clindamycin: R <=0.25 This isolate is presumed to be clindamycin resistant based on detection of inducible resistance. Clindamycin may still be effective in some patients.      -  Erythromycin: R >4      -  Gentamicin: S <=1 Should not be used as monotherapy      -  Oxacillin: S <=0.25      -  Penicillin: R 4      -  RIF- Rifampin: S <=1 Should not be used as monotherapy      -  Tetra/Doxy: S <=1      -  Trimethoprim/Sulfamethoxazole: S <=0.5/9.5      -  Vancomycin: S 1      Method Type: YOLIE    Culture - Body Fluid with Gram Stain (collected 04-26-21 @ 01:15)  Source: .Body Fluid Synovial Fluid  Gram Stain (04-26-21 @ 11:35):    polymorphonuclear leukocytes per low power field    No organisms seen per oil power field    by cytocentrifuge  Final Report (05-10-21 @ 10:35):    Numerous Staphylococcus aureus  Organism: Staphylococcus aureus (05-10-21 @ 10:35)  Organism: Staphylococcus aureus (05-10-21 @ 10:35)      -  Ampicillin/Sulbactam: S <=8/4      -  Cefazolin: S <=4      -  Clindamycin: R <=0.25 This isolate is presumed to be clindamycin resistant based on detection of inducible resistance. Clindamycin may still be effective in some patients.      -  Erythromycin: R >4      -  Gentamicin: S <=1 Should not be used as monotherapy      -  Oxacillin: S <=0.25      -  Penicillin: R 4      -  RIF- Rifampin: S <=1 Should not be used as monotherapy      -  Tetra/Doxy: S <=1      -  Trimethoprim/Sulfamethoxazole: S <=0.5/9.5      -  Vancomycin: S 2      Method Type: YOLIE    Culture - Urine (collected 04-26-21 @ 00:31)  Source: .Urine Clean Catch (Midstream)  Final Report (04-27-21 @ 10:57):    <10,000 CFU/mL Normal Urogenital Tricia    Culture - Blood (collected 04-26-21 @ 00:31)  Source: .Blood Blood  Gram Stain (04-27-21 @ 01:48):    Growth in aerobic bottle: Gram Positive Cocci in Clusters  Final Report (04-28-21 @ 16:38):    Growth in aerobic bottle: Staphylococcus aureus    ***Blood Panel PCR results on this specimen are available    approximately 3 hours after the Gram stain result.***    Gram stain, PCR, and/or culture results may not always    correspond due to difference in methodologies.    ************************************************************    This PCR assay was performed by multiplex PCR. This    Assay tests for 66 bacterial and resistance gene targets.    Please refer to the [facility] test directory    at [email or web address] for details.  Organism: Blood Culture PCR  Staphylococcus aureus (04-28-21 @ 16:38)  Organism: Staphylococcus aureus (04-28-21 @ 16:38)      -  Ampicillin/Sulbactam: S <=8/4      -  Cefazolin: S <=4      -  Clindamycin: R 0.5 This isolate is presumed to be clindamycin resistant based on detection of inducible resistance. Clindamycin may still be effective in some patients.      -  Erythromycin: R >4      -  Gentamicin: S <=1 Should not be used as monotherapy      -  Oxacillin: S <=0.25      -  Penicillin: R 4      -  RIF- Rifampin: S <=1 Should not be used as monotherapy      -  Tetra/Doxy: S <=1      -  Trimethoprim/Sulfamethoxazole: S <=0.5/9.5      -  Vancomycin: S 2      Method Type: YOLIE  Organism: Blood Culture PCR (04-28-21 @ 16:38)      -  Staphylococcus aureus: Detec Any isolate of Staphylococcus aureus from a blood culture is NOT considered a contaminant.      Method Type: PCR    Culture - Blood (collected 04-26-21 @ 00:31)  Source: .Blood Blood  Gram Stain (04-27-21 @ 12:53):    Growth in aerobic bottle: Gram Positive Cocci in Clusters    Growth in anaerobic bottle: Gram Positive Cocci in Clusters  Final Report (04-28-21 @ 16:39):    Growth in aerobic and anaerobic bottles: Staphylococcus aureus    See previous culture [id number]            INFECTIOUS DISEASES TESTING  COVID-19 PCR: NotDetec (05-02-21 @ 08:52)  COVID-19 PCR: Detected (04-29-21 @ 14:33)  COVID-19 PCR: NotDetec (04-26-21 @ 06:00)  COVID-19 PCR: NotDetec (03-12-21 @ 11:00)  COVID-19 PCR: Detected (02-05-21 @ 13:47)  COVID-19 PCR: NotDetec (10-13-20 @ 09:08)  COVID-19 PCR: NotDetec (10-03-20 @ 19:54)  COVID-19 PCR: NotDetec (09-01-20 @ 20:40)  COVID-19 PCR: NotDetec (07-30-20 @ 07:43)  COVID-19 PCR: NotDetec (07-16-20 @ 19:46)  COVID-19 PCR: NotDetec (05-17-20 @ 10:18)      INFLAMMATORY MARKERS  Sedimentation Rate, Erythrocyte: 106 mm/Hr (05-11-21 @ 07:50)  C-Reactive Protein, Serum: 108 mg/L (05-11-21 @ 07:50)  Sedimentation Rate, Erythrocyte: 116 mm/Hr (05-05-21 @ 05:12)  C-Reactive Protein, Serum: 116 mg/L (05-05-21 @ 05:12)      RADIOLOGY & ADDITIONAL TESTS:  I have personally reviewed the last available Chest xray  CXR      CT      CARDIOLOGY TESTING      MEDICATIONS  ascorbic acid 500 Oral daily  aspirin enteric coated 81 Oral daily  atorvastatin 80 Oral at bedtime  chlorhexidine 4% Liquid 1 Topical <User Schedule>  dextrose 40% Gel 15 Oral once  dextrose 5%. 1000 IV Continuous <Continuous>  dextrose 5%. 1000 IV Continuous <Continuous>  dextrose 50% Injectable 25 IV Push once  dextrose 50% Injectable 12.5 IV Push once  dextrose 50% Injectable 25 IV Push once  digoxin     Tablet 125 Oral daily  DULoxetine 90 Oral daily  enoxaparin Injectable 40 SubCutaneous daily  gabapentin 100 Oral every 8 hours  glucagon  Injectable 1 IntraMuscular once  insulin glargine Injectable (LANTUS) 30 SubCutaneous at bedtime  insulin lispro (ADMELOG) corrective regimen sliding scale  SubCutaneous three times a day before meals  insulin lispro Injectable (ADMELOG) 10 SubCutaneous three times a day before meals  metoprolol succinate ER 25 Oral daily  morphine ER Tablet 15 Oral two times a day  multivitamin/minerals 1 Oral daily  nafcillin  IVPB 2 IV Intermittent every 4 hours  nafcillin  IVPB     nystatin Cream 1 Topical two times a day  pantoprazole    Tablet 40 Oral before breakfast  polyethylene glycol 3350 17 Oral two times a day  prasugrel 10 Oral daily  senna 2 Oral at bedtime  spironolactone 25 Oral daily      WEIGHT  Weight (kg): 81 (05-05-21 @ 13:12)  Creatinine, Serum: 0.9 mg/dL (05-12-21 @ 06:18)      ANTIBIOTICS:  nafcillin  IVPB 2 Gram(s) IV Intermittent every 4 hours  nafcillin  IVPB          All available historical records have been reviewed

## 2021-05-12 NOTE — PROVIDER CONTACT NOTE (MEDICATION) - ACTION/TREATMENT ORDERED:
MD Torrez notified and made aware. as per MD, please recheck BG and hold lantus at this time. MD Torrez notified and made aware. as per MD, please recheck BG and hold lantus at this time. if BG does not go up, administer glucose as ordered.

## 2021-05-12 NOTE — PROVIDER CONTACT NOTE (MEDICATION) - SITUATION
Notified team face to face can't infuse antibiotic medication r/t movement.  Patient re-educated about compliance/position/importance of IV therapy
pt's blood glucose (BG) was 63 around 21:00; pt was lethargic. pt was offered 2 cranberry juices. at this time, BG is 71 and pt is more awake. pt is also due for 30 units lantus now.
Notified team about patitent refusing glucose monitoring/patient states insulin pump is attached (on bedside table since 04/28/21). Patient only received one BG check/coverage on 04/28/21.
Pt noted to not be connected to pump. He states that he needs to disconnect when "he's washing up or doing certain things". Pt unable to find pump- staff looked through linens and bed.

## 2021-05-12 NOTE — PROGRESS NOTE ADULT - ASSESSMENT
This isASSESSMENT  65 year old male with PMH of CAD s/p MI, PCI/CABG, CHFrEF (15-20%), has ICD, HTN, celiac disease, DM, neuropathy, chronic b/l LE ulcers presents, severe chronic pain,  hx infected R TKR with MRSA (was eventually cemented so has limited ROM R knee), and history of cholecystostomy tube placement in February 2020 for acute cholecystitis s/p removal in May 2020 with multiple presentations including persistent bilious drainage from the prior tract site as well as a RUQ abdominal abscess at the site s/p drainage who presents with left knee pain    IMPRESSION  #Septic Arthritis of left knee with MSSA bacteremia  - s/p arthrocentesis of left knee - consistent with baterial septic infection   - s/p OR washout 4/26 -- purulent fluid in left knee with significant tricompartmental arthritis   - Blood Cx 4/26 MSSA  - OR Cx 4/26 MSSA  - Blood Cx 4/27 NG, 4/28 growing Staph   - Blood Cx 4/30-5/4 NG  - JOHN PAUL 5/5: no evidence of valvular or lead vegation; noted mild, non-mobile atheroma seen in the thoracic aorta.   - MR Knee w/wo IV Cont, Left (05.06.21 @ 19:05): Septic arthritis of the knee with osteomyelitis in the distal femur and proximal tibia. Associated large joint effusion with extensive synovitis. Noadditional collection identified.  - s/p knee tap 5/9 -- 15 cc of blood fluid; WBC 90418 (95% PMN)    #Biliary Drainage from previous perc sudhir site  - Previous Intraabdominal 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 Decreased area of right upper quadrant subcutaneous stranding at site of prior percutaneous cholecystostomy, without evidence of abscess.  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    #CHF s/p ICD  #DM   #Abx allergy: carvedilol (Other)  enalapril (Hives)  Entresto (Other)    Creatinine, Serum: 1.0 mg/dL (04.25.21 @ 22:15)  Weight (kg): 81 (26 Apr 2021 17:23)    RECOMMENDATIONS  - nafcillin 2g q 4 hours  - serial arthrocentesis per ortho to trend WBC  - pain control per primary  - will need at least 6 weeks from time of debridement     Please call or message on [employer] Teams if with any questions.  Spectra [phone]

## 2021-05-12 NOTE — PROGRESS NOTE ADULT - SUBJECTIVE AND OBJECTIVE BOX
Patient is a 65y old  Male who presents with a chief complaint of Septic arthritis (05 May 2021 07:23)      S  Mood is better, L knee pain is improving        Vital Signs Last 24 Hrs  T(C): 36.8 (05 May 2021 12:00), Max: 36.8 (04 May 2021 21:35)  T(F): 98.3 (05 May 2021 12:00), Max: 98.3 (04 May 2021 21:35)  HR: 77 (05 May 2021 12:00) (77 - 79)  BP: 101/56 (05 May 2021 12:00) (101/56 - 118/62)  BP(mean): --  RR: 16 (05 May 2021 12:00) (16 - 18)  SpO2: --    PHYSICAL EXAM:   NAD; Normocephalic;   LUNGS - no wheezing  HEART: S1 S2+   ABDOMEN: Soft, Nontender, non distended  EXTREMITIES: no cyanosis; no edema L knee dressed   NERVOUS SYSTEM:  Awake and alert; no focal neuro deficits appreciated      LABS:                        8.0    10.62 )-----------( 374      ( 05 May 2021 05:12 )             27.3     05-05    135  |  99  |  17  ----------------------------<  346<H>  4.3   |  24  |  0.9    Ca    8.2<L>      05 May 2021 05:12  Mg     2.0     05-04    TPro  6.3  /  Alb  2.7<L>  /  TBili  0.5  /  DBili  x   /  AST  12  /  ALT  14  /  AlkPhos  95  05-05        CAPILLARY BLOOD GLUCOSE

## 2021-05-12 NOTE — PROGRESS NOTE ADULT - ASSESSMENT
A/P:-  Pt is seen and evaluated by bedside.     1. Septic arthritis of Left knee with severe synovitis, s/p washout with recurrent effusion, s/p arthrocentesis 05/11 --sample compromised    2. 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 MSSA bacteremia   4. 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 Chronic iron deficiency anemia with component of ACD  6. DMII   7. Chronic HFrEF s/p AICD   8. CAD    - XR bilateral knees (04/26) large joint effusion in left knee  - s/p arthroscopic drainage of left knee on 04/26 and OR washout 4/26 - 20mL purulent fluid in left knee -  - Synovial Culture (04/26) MSSA  s/p  5/11 Arthrocentesis yielded 5 ml bloody fluid-Specimen compromised though   - Blood Culture (04/26) x2, 04/27 NGTD, 04/28 x2 SJ, 04/30-05/01 NGTD, 05/02 and 05/03 received  - JOHN PAUL  - no endocarditis noted   - currently on nefcillin 2 q4 -->plan to discharge on cefazolin 2 q8 as per ID for total of 6 weeks   - needs repeat MRI of knee for posterior collection   - continue with pain control   - History of acute cholecystitis s/p cholecystostomy tube placement in February 2020 s/p removal in May 2020, with fistula formation and persistent bilious drainage from the prior tract site as well as a RUQ abdominal abscess at the site s/p drainage  - Previous Intraabdominal 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 Decreased area of right upper quadrant subcutaneous stranding at site of prior percutaneous cholecystostomy, without evidence of abscess.  - HIDA scan 05/02: cannot visualize GB  - as per advance GI Dr. Dixon outpatient ERCP and cholecystectomy to divert bile and heal fistula, recommended vac wound  - no further intervention as per IR and surgery   - hb level stable at this time - no active bleeding noted   - Last a1c 9.1 04/29  - off insulin pump, as he has lost it   - Endocrinology reconsulted, Basal bolus insulin ordered     - EP on board: s/p interrogation of AICD 04/28 (normally functioning)  - Cardiology Dr Nelson is on board  - Continue Lasix PO 40mg PRN and aldactone 25mg QD   - Continue Digoxin 125mg QD  - Continue Aspirin 81mg/ Prasugrel 10mg, Rosuvastatin 40mg and toprol

## 2021-05-12 NOTE — PROGRESS NOTE ADULT - SUBJECTIVE AND OBJECTIVE BOX
Orthopaedics Progress Note    FLOWER MARISCAL  [id number]    POD#16 from L knee I&D.    L knee aspirated yesterday, pending results.  Continues to report pain.    Tolerating PO intake. Denies nausea/vomiting, fever/chills, chest pain/SOB.    acetaminophen   Tablet .. 650 milliGRAM(s) Oral every 6 hours PRN  ascorbic acid 500 milliGRAM(s) Oral daily  aspirin enteric coated 81 milliGRAM(s) Oral daily  atorvastatin 80 milliGRAM(s) Oral at bedtime  chlorhexidine 4% Liquid 1 Application(s) Topical <User Schedule>  dextrose 40% Gel 15 Gram(s) Oral once  dextrose 5%. 1000 milliLiter(s) IV Continuous <Continuous>  dextrose 5%. 1000 milliLiter(s) IV Continuous <Continuous>  dextrose 50% Injectable 25 Gram(s) IV Push once  dextrose 50% Injectable 12.5 Gram(s) IV Push once  dextrose 50% Injectable 25 Gram(s) IV Push once  digoxin     Tablet 125 MICROGram(s) Oral daily  DULoxetine 90 milliGRAM(s) Oral daily  enoxaparin Injectable 40 milliGRAM(s) SubCutaneous daily  furosemide    Tablet 40 milliGRAM(s) Oral daily PRN  gabapentin 100 milliGRAM(s) Oral every 8 hours  glucagon  Injectable 1 milliGRAM(s) IntraMuscular once  insulin glargine Injectable (LANTUS) 30 Unit(s) SubCutaneous at bedtime  insulin lispro (ADMELOG) corrective regimen sliding scale   SubCutaneous three times a day before meals  insulin lispro Injectable (ADMELOG) 10 Unit(s) SubCutaneous three times a day before meals  melatonin 10 milliGRAM(s) Oral at bedtime PRN  metoprolol succinate ER 25 milliGRAM(s) Oral daily  morphine  - Injectable 6 milliGRAM(s) IV Push every 4 hours PRN  morphine ER Tablet 15 milliGRAM(s) Oral two times a day  multivitamin/minerals 1 Tablet(s) Oral daily  nafcillin  IVPB 2 Gram(s) IV Intermittent every 4 hours  nafcillin  IVPB      nystatin Cream 1 Application(s) Topical two times a day  pantoprazole    Tablet 40 milliGRAM(s) Oral before breakfast  polyethylene glycol 3350 17 Gram(s) Oral two times a day  prasugrel 10 milliGRAM(s) Oral daily  senna 2 Tablet(s) Oral at bedtime  spironolactone 25 milliGRAM(s) Oral daily      T(C): 36.7 (05-12-21 @ 05:47), Max: 37.6 (05-11-21 @ 21:54)  HR: 82 (05-12-21 @ 05:47) (73 - 88)  BP: 122/63 (05-12-21 @ 05:47) (114/58 - 122/63)  RR: 18 (05-12-21 @ 05:47) (18 - 18)  SpO2: --    Physical Exam  NAD, resting comfortably  Breathing comfortably on RA    LLE  sutures in place  Mild swelling, no erythema or drainage  Compartments soft and compressible  Motor intact   SILT gaines/sa/dp/sp  CR<2sec    Labs                        8.0    10.16 )-----------( 539      ( 11 May 2021 07:50 )             27.2     05-11    135  |  96<L>  |  15  ----------------------------<  209<H>  5.1<H>   |  29  |  0.9    Ca    8.3<L>      11 May 2021 07:50          A/P: Patient is a 65y year old Male POD#16 from left knee I&D     WBAT on LLE  trend ESR/CRP  f/u L knee aspiration culture/cell count/gm stain  Surgery following for bilious drainage  DVT ppx: SCD, LVX  Abx: nafcillin  Pain control  Home medications: resume  Trend labs

## 2021-05-12 NOTE — PROVIDER CONTACT NOTE (MEDICATION) - REASON
blood glucose; lantus
Non compliance with insulin/DM management
Pt detatched insulin pump and now can't locate it

## 2021-05-13 LAB
GLUCOSE BLDC GLUCOMTR-MCNC: 116 MG/DL — HIGH (ref 70–99)
GLUCOSE BLDC GLUCOMTR-MCNC: 158 MG/DL — HIGH (ref 70–99)
GLUCOSE BLDC GLUCOMTR-MCNC: 206 MG/DL — HIGH (ref 70–99)
GLUCOSE BLDC GLUCOMTR-MCNC: 207 MG/DL — HIGH (ref 70–99)
POTASSIUM SERPL-MCNC: 4.8 MMOL/L — SIGNIFICANT CHANGE UP (ref 3.5–5)
POTASSIUM SERPL-SCNC: 4.8 MMOL/L — SIGNIFICANT CHANGE UP (ref 3.5–5)

## 2021-05-13 PROCEDURE — 99232 SBSQ HOSP IP/OBS MODERATE 35: CPT

## 2021-05-13 RX ADMIN — MORPHINE SULFATE 6 MILLIGRAM(S): 50 CAPSULE, EXTENDED RELEASE ORAL at 22:26

## 2021-05-13 RX ADMIN — PRASUGREL 10 MILLIGRAM(S): 5 TABLET, FILM COATED ORAL at 11:48

## 2021-05-13 RX ADMIN — NAFCILLIN 200 GRAM(S): 10 INJECTION, POWDER, FOR SOLUTION INTRAVENOUS at 05:30

## 2021-05-13 RX ADMIN — Medication 10 UNIT(S): at 08:01

## 2021-05-13 RX ADMIN — NAFCILLIN 200 GRAM(S): 10 INJECTION, POWDER, FOR SOLUTION INTRAVENOUS at 02:05

## 2021-05-13 RX ADMIN — Medication 0: at 11:52

## 2021-05-13 RX ADMIN — GABAPENTIN 100 MILLIGRAM(S): 400 CAPSULE ORAL at 21:25

## 2021-05-13 RX ADMIN — MORPHINE SULFATE 15 MILLIGRAM(S): 50 CAPSULE, EXTENDED RELEASE ORAL at 17:35

## 2021-05-13 RX ADMIN — NAFCILLIN 200 GRAM(S): 10 INJECTION, POWDER, FOR SOLUTION INTRAVENOUS at 17:28

## 2021-05-13 RX ADMIN — Medication 25 MILLIGRAM(S): at 05:30

## 2021-05-13 RX ADMIN — MORPHINE SULFATE 6 MILLIGRAM(S): 50 CAPSULE, EXTENDED RELEASE ORAL at 00:07

## 2021-05-13 RX ADMIN — GABAPENTIN 100 MILLIGRAM(S): 400 CAPSULE ORAL at 05:30

## 2021-05-13 RX ADMIN — NAFCILLIN 200 GRAM(S): 10 INJECTION, POWDER, FOR SOLUTION INTRAVENOUS at 13:38

## 2021-05-13 RX ADMIN — NAFCILLIN 200 GRAM(S): 10 INJECTION, POWDER, FOR SOLUTION INTRAVENOUS at 10:00

## 2021-05-13 RX ADMIN — Medication 2: at 08:00

## 2021-05-13 RX ADMIN — MORPHINE SULFATE 15 MILLIGRAM(S): 50 CAPSULE, EXTENDED RELEASE ORAL at 17:30

## 2021-05-13 RX ADMIN — ENOXAPARIN SODIUM 40 MILLIGRAM(S): 100 INJECTION SUBCUTANEOUS at 11:49

## 2021-05-13 RX ADMIN — MORPHINE SULFATE 6 MILLIGRAM(S): 50 CAPSULE, EXTENDED RELEASE ORAL at 04:32

## 2021-05-13 RX ADMIN — GABAPENTIN 100 MILLIGRAM(S): 400 CAPSULE ORAL at 13:38

## 2021-05-13 RX ADMIN — SPIRONOLACTONE 25 MILLIGRAM(S): 25 TABLET, FILM COATED ORAL at 05:30

## 2021-05-13 RX ADMIN — ATORVASTATIN CALCIUM 80 MILLIGRAM(S): 80 TABLET, FILM COATED ORAL at 21:25

## 2021-05-13 RX ADMIN — MORPHINE SULFATE 6 MILLIGRAM(S): 50 CAPSULE, EXTENDED RELEASE ORAL at 04:14

## 2021-05-13 RX ADMIN — Medication 500 MILLIGRAM(S): at 11:48

## 2021-05-13 RX ADMIN — NAFCILLIN 200 GRAM(S): 10 INJECTION, POWDER, FOR SOLUTION INTRAVENOUS at 21:26

## 2021-05-13 RX ADMIN — Medication 81 MILLIGRAM(S): at 11:50

## 2021-05-13 RX ADMIN — SENNA PLUS 2 TABLET(S): 8.6 TABLET ORAL at 21:25

## 2021-05-13 RX ADMIN — NYSTATIN CREAM 1 APPLICATION(S): 100000 CREAM TOPICAL at 17:31

## 2021-05-13 RX ADMIN — DULOXETINE HYDROCHLORIDE 90 MILLIGRAM(S): 30 CAPSULE, DELAYED RELEASE ORAL at 11:48

## 2021-05-13 RX ADMIN — NYSTATIN CREAM 1 APPLICATION(S): 100000 CREAM TOPICAL at 05:31

## 2021-05-13 RX ADMIN — PANTOPRAZOLE SODIUM 40 MILLIGRAM(S): 20 TABLET, DELAYED RELEASE ORAL at 05:30

## 2021-05-13 RX ADMIN — Medication 2: at 17:32

## 2021-05-13 RX ADMIN — MORPHINE SULFATE 6 MILLIGRAM(S): 50 CAPSULE, EXTENDED RELEASE ORAL at 00:23

## 2021-05-13 RX ADMIN — Medication 125 MICROGRAM(S): at 05:30

## 2021-05-13 RX ADMIN — Medication 1 TABLET(S): at 11:49

## 2021-05-13 RX ADMIN — MORPHINE SULFATE 15 MILLIGRAM(S): 50 CAPSULE, EXTENDED RELEASE ORAL at 05:32

## 2021-05-13 RX ADMIN — INSULIN GLARGINE 30 UNIT(S): 100 INJECTION, SOLUTION SUBCUTANEOUS at 21:26

## 2021-05-13 RX ADMIN — MORPHINE SULFATE 6 MILLIGRAM(S): 50 CAPSULE, EXTENDED RELEASE ORAL at 22:10

## 2021-05-13 NOTE — PROGRESS NOTE ADULT - SUBJECTIVE AND OBJECTIVE BOX
Patient is a 65y old  Male who presents with a chief complaint of Septic arthritis (05 May 2021 07:23)      S  Mood is better, L knee pain still there and not bending his knee much   Says BS was low last night after receiving short acting insulin now refusing even lower dose       Vital Signs Last 24 Hrs  T(C): 36.8 (05 May 2021 12:00), Max: 36.8 (04 May 2021 21:35)  T(F): 98.3 (05 May 2021 12:00), Max: 98.3 (04 May 2021 21:35)  HR: 77 (05 May 2021 12:00) (77 - 79)  BP: 101/56 (05 May 2021 12:00) (101/56 - 118/62)  BP(mean): --  RR: 16 (05 May 2021 12:00) (16 - 18)  SpO2: --    PHYSICAL EXAM:   NAD; Normocephalic;   LUNGS - no wheezing  HEART: S1 S2+   ABDOMEN: Soft, Nontender, non distended  EXTREMITIES: no cyanosis; no edema L knee dressed   NERVOUS SYSTEM:  Awake and alert; no focal neuro deficits appreciated      LABS:                        8.0    10.62 )-----------( 374      ( 05 May 2021 05:12 )             27.3     05-05    135  |  99  |  17  ----------------------------<  346<H>  4.3   |  24  |  0.9    Ca    8.2<L>      05 May 2021 05:12  Mg     2.0     05-04    TPro  6.3  /  Alb  2.7<L>  /  TBili  0.5  /  DBili  x   /  AST  12  /  ALT  14  /  AlkPhos  95  05-05        CAPILLARY BLOOD GLUCOSE

## 2021-05-13 NOTE — PROGRESS NOTE ADULT - ASSESSMENT
A/P:-  Pt is seen and evaluated by bedside.     1. Septic arthritis of Left knee with severe synovitis, s/p washout with recurrent effusion, s/p arthrocentesis 05/11 --sample compromised    2. 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 MSSA bacteremia   4. 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 Chronic iron deficiency anemia with component of ACD  6. DMII   7. Chronic HFrEF s/p AICD   8. CAD    - XR bilateral knees (04/26) large joint effusion in left knee  - s/p arthroscopic drainage of left knee on 04/26 and OR washout 4/26 - 20mL purulent fluid in left knee -  - Synovial Culture (04/26) MSSA  s/p  5/11 Arthrocentesis yielded 5 ml bloody fluid-Specimen compromised though   - Blood Culture (04/26) x2, 04/27 NGTD, 04/28 x2 SJ, 04/30-05/01 NGTD, 05/02 and 05/03 received  - JOHN PAUL  - no endocarditis noted   - currently on nefcillin 2 q4 -->plan to discharge on cefazolin 2 q8 as per ID for total of 6 weeks   - needs repeat MRI of knee for posterior collection   - continue with pain control   - History of acute cholecystitis s/p cholecystostomy tube placement in February 2020 s/p removal in May 2020, with fistula formation and persistent bilious drainage from the prior tract site as well as a RUQ abdominal abscess at the site s/p drainage  - Previous Intraabdominal 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 Decreased area of right upper quadrant subcutaneous stranding at site of prior percutaneous cholecystostomy, without evidence of abscess.  - HIDA scan 05/02: cannot visualize GB  - as per advance GI Dr. Dixon outpatient ERCP and cholecystectomy to divert bile and heal fistula, recommended vac wound  - no further intervention as per IR and surgery   - hb level stable at this time - no active bleeding noted   - Last a1c 9.1 04/29  - off insulin pump, as he has lost it   - Endocrinology reconsulted, Basal bolus insulin ordered However dropped his BS 5/12 after receiving short acting insulin, now refusing even lower dose of short acting insulin will titrate lantus down as it was not given last night     - EP on board: s/p interrogation of AICD 04/28 (normally functioning)  - Cardiology Dr Nelson is on board  - Continue Lasix PO 40mg PRN and aldactone 25mg QD   - Continue Digoxin 125mg QD  - Continue Aspirin 81mg/ Prasugrel 10mg, Rosuvastatin 40mg and toprol       DC  home?  pending Ortho clearance

## 2021-05-14 LAB
GLUCOSE BLDC GLUCOMTR-MCNC: 104 MG/DL — HIGH (ref 70–99)
GLUCOSE BLDC GLUCOMTR-MCNC: 135 MG/DL — HIGH (ref 70–99)
GLUCOSE BLDC GLUCOMTR-MCNC: 142 MG/DL — HIGH (ref 70–99)
GLUCOSE BLDC GLUCOMTR-MCNC: 204 MG/DL — HIGH (ref 70–99)

## 2021-05-14 PROCEDURE — 99232 SBSQ HOSP IP/OBS MODERATE 35: CPT

## 2021-05-14 RX ORDER — INSULIN GLARGINE 100 [IU]/ML
25 INJECTION, SOLUTION SUBCUTANEOUS AT BEDTIME
Refills: 0 | Status: DISCONTINUED | OUTPATIENT
Start: 2021-05-14 | End: 2021-05-26

## 2021-05-14 RX ORDER — MORPHINE SULFATE 50 MG/1
4 CAPSULE, EXTENDED RELEASE ORAL EVERY 4 HOURS
Refills: 0 | Status: DISCONTINUED | OUTPATIENT
Start: 2021-05-14 | End: 2021-05-21

## 2021-05-14 RX ADMIN — GABAPENTIN 100 MILLIGRAM(S): 400 CAPSULE ORAL at 21:54

## 2021-05-14 RX ADMIN — Medication 25 MILLIGRAM(S): at 05:31

## 2021-05-14 RX ADMIN — GABAPENTIN 100 MILLIGRAM(S): 400 CAPSULE ORAL at 13:21

## 2021-05-14 RX ADMIN — NAFCILLIN 200 GRAM(S): 10 INJECTION, POWDER, FOR SOLUTION INTRAVENOUS at 05:31

## 2021-05-14 RX ADMIN — Medication 1 TABLET(S): at 11:25

## 2021-05-14 RX ADMIN — INSULIN GLARGINE 25 UNIT(S): 100 INJECTION, SOLUTION SUBCUTANEOUS at 21:55

## 2021-05-14 RX ADMIN — NAFCILLIN 200 GRAM(S): 10 INJECTION, POWDER, FOR SOLUTION INTRAVENOUS at 13:24

## 2021-05-14 RX ADMIN — ENOXAPARIN SODIUM 40 MILLIGRAM(S): 100 INJECTION SUBCUTANEOUS at 11:25

## 2021-05-14 RX ADMIN — MORPHINE SULFATE 6 MILLIGRAM(S): 50 CAPSULE, EXTENDED RELEASE ORAL at 13:52

## 2021-05-14 RX ADMIN — MORPHINE SULFATE 6 MILLIGRAM(S): 50 CAPSULE, EXTENDED RELEASE ORAL at 08:51

## 2021-05-14 RX ADMIN — Medication 2: at 17:22

## 2021-05-14 RX ADMIN — NAFCILLIN 200 GRAM(S): 10 INJECTION, POWDER, FOR SOLUTION INTRAVENOUS at 17:24

## 2021-05-14 RX ADMIN — MORPHINE SULFATE 15 MILLIGRAM(S): 50 CAPSULE, EXTENDED RELEASE ORAL at 17:24

## 2021-05-14 RX ADMIN — Medication 125 MICROGRAM(S): at 05:31

## 2021-05-14 RX ADMIN — MORPHINE SULFATE 4 MILLIGRAM(S): 50 CAPSULE, EXTENDED RELEASE ORAL at 23:42

## 2021-05-14 RX ADMIN — PANTOPRAZOLE SODIUM 40 MILLIGRAM(S): 20 TABLET, DELAYED RELEASE ORAL at 05:31

## 2021-05-14 RX ADMIN — ATORVASTATIN CALCIUM 80 MILLIGRAM(S): 80 TABLET, FILM COATED ORAL at 21:54

## 2021-05-14 RX ADMIN — SENNA PLUS 2 TABLET(S): 8.6 TABLET ORAL at 21:55

## 2021-05-14 RX ADMIN — SPIRONOLACTONE 25 MILLIGRAM(S): 25 TABLET, FILM COATED ORAL at 05:31

## 2021-05-14 RX ADMIN — PRASUGREL 10 MILLIGRAM(S): 5 TABLET, FILM COATED ORAL at 11:24

## 2021-05-14 RX ADMIN — MORPHINE SULFATE 15 MILLIGRAM(S): 50 CAPSULE, EXTENDED RELEASE ORAL at 05:31

## 2021-05-14 RX ADMIN — NAFCILLIN 200 GRAM(S): 10 INJECTION, POWDER, FOR SOLUTION INTRAVENOUS at 11:25

## 2021-05-14 RX ADMIN — MORPHINE SULFATE 6 MILLIGRAM(S): 50 CAPSULE, EXTENDED RELEASE ORAL at 09:21

## 2021-05-14 RX ADMIN — NYSTATIN CREAM 1 APPLICATION(S): 100000 CREAM TOPICAL at 05:30

## 2021-05-14 RX ADMIN — POLYETHYLENE GLYCOL 3350 17 GRAM(S): 17 POWDER, FOR SOLUTION ORAL at 17:21

## 2021-05-14 RX ADMIN — NAFCILLIN 200 GRAM(S): 10 INJECTION, POWDER, FOR SOLUTION INTRAVENOUS at 21:55

## 2021-05-14 RX ADMIN — Medication 500 MILLIGRAM(S): at 11:25

## 2021-05-14 RX ADMIN — NAFCILLIN 200 GRAM(S): 10 INJECTION, POWDER, FOR SOLUTION INTRAVENOUS at 02:11

## 2021-05-14 RX ADMIN — MORPHINE SULFATE 6 MILLIGRAM(S): 50 CAPSULE, EXTENDED RELEASE ORAL at 13:22

## 2021-05-14 RX ADMIN — Medication 81 MILLIGRAM(S): at 11:25

## 2021-05-14 RX ADMIN — DULOXETINE HYDROCHLORIDE 90 MILLIGRAM(S): 30 CAPSULE, DELAYED RELEASE ORAL at 11:25

## 2021-05-14 RX ADMIN — GABAPENTIN 100 MILLIGRAM(S): 400 CAPSULE ORAL at 05:31

## 2021-05-14 RX ADMIN — NYSTATIN CREAM 1 APPLICATION(S): 100000 CREAM TOPICAL at 17:22

## 2021-05-14 NOTE — PROGRESS NOTE ADULT - ASSESSMENT
Chart reviewed  Obtain new set of labs  Will follow  Discussed possible plan for repeat aspiration with the patient

## 2021-05-14 NOTE — PROGRESS NOTE ADULT - SUBJECTIVE AND OBJECTIVE BOX
SUBJ: Patient seen and examined. No events overnight. Feeling better .       MEDICATIONS  (STANDING):  ascorbic acid 500 milliGRAM(s) Oral daily  aspirin enteric coated 81 milliGRAM(s) Oral daily  atorvastatin 80 milliGRAM(s) Oral at bedtime  chlorhexidine 4% Liquid 1 Application(s) Topical <User Schedule>  dextrose 40% Gel 15 Gram(s) Oral once  dextrose 5%. 1000 milliLiter(s) (50 mL/Hr) IV Continuous <Continuous>  dextrose 5%. 1000 milliLiter(s) (100 mL/Hr) IV Continuous <Continuous>  dextrose 50% Injectable 25 Gram(s) IV Push once  dextrose 50% Injectable 12.5 Gram(s) IV Push once  dextrose 50% Injectable 25 Gram(s) IV Push once  digoxin     Tablet 125 MICROGram(s) Oral daily  DULoxetine 90 milliGRAM(s) Oral daily  enoxaparin Injectable 40 milliGRAM(s) SubCutaneous daily  gabapentin 100 milliGRAM(s) Oral every 8 hours  glucagon  Injectable 1 milliGRAM(s) IntraMuscular once  insulin glargine Injectable (LANTUS) 25 Unit(s) SubCutaneous at bedtime  insulin lispro (ADMELOG) corrective regimen sliding scale   SubCutaneous three times a day before meals  metoprolol succinate ER 25 milliGRAM(s) Oral daily  morphine ER Tablet 15 milliGRAM(s) Oral two times a day  multivitamin/minerals 1 Tablet(s) Oral daily  nafcillin  IVPB 2 Gram(s) IV Intermittent every 4 hours  nafcillin  IVPB      nystatin Cream 1 Application(s) Topical two times a day  pantoprazole    Tablet 40 milliGRAM(s) Oral before breakfast  polyethylene glycol 3350 17 Gram(s) Oral two times a day  prasugrel 10 milliGRAM(s) Oral daily  senna 2 Tablet(s) Oral at bedtime  spironolactone 25 milliGRAM(s) Oral daily    MEDICATIONS  (PRN):  acetaminophen   Tablet .. 650 milliGRAM(s) Oral every 6 hours PRN Temp greater or equal to 38C (100.4F), Mild Pain (1 - 3)  furosemide    Tablet 40 milliGRAM(s) Oral daily PRN fluid overload  melatonin 10 milliGRAM(s) Oral at bedtime PRN Insomnia  morphine  - Injectable 4 milliGRAM(s) IV Push every 4 hours PRN Severe Pain (7 - 10)            Vital Signs Last 24 Hrs  T(C): 36.8 (14 May 2021 12:45), Max: 36.8 (14 May 2021 12:45)  T(F): 98.2 (14 May 2021 12:45), Max: 98.2 (14 May 2021 12:45)  HR: 74 (14 May 2021 12:45) (74 - 77)  BP: 122/60 (14 May 2021 12:45) (113/59 - 122/60)  BP(mean): --  RR: 20 (14 May 2021 12:45) (18 - 20)  SpO2: --     REVIEW OF SYSTEMS:  CONSTITUTIONAL: No fever  NECK: No pain or stiffness  CARDIOVASCULAR: patient denies chest pain, shortness of breath or palpitations .  Repiratory: No cough or wheezing.  GI: no BRBPR, no N,V,diarrhea.    PSYCHIATRY: normal mood and affect  HEENT: no nasal discharge, no ecchymosis        PHYSICAL EXAM:  GENERAL: NAD  HEAD:  Atraumatic, Normocephalic  NECK: Supple, No JVD  NERVOUS SYSTEM:  Alert & Oriented X3, Good concentration  CHEST/LUNG: Clear to anterior auscultation bilaterally  HEART: Regular rate and rhythm  ABDOMEN: Soft, Nontender, Nondistended;   EXTREMITIES:  No  edema      LABS:    05-13    x   |  x   |  x   ----------------------------<  x   4.8   |  x   |  x                 I&O's Summary    13 May 2021 07:01  -  14 May 2021 07:00  --------------------------------------------------------  IN: 595 mL / OUT: 300 mL / NET: 295 mL    14 May 2021 07:01  -  14 May 2021 18:08  --------------------------------------------------------  IN: 0 mL / OUT: 40 mL / NET: -40 mL      BNP  RADIOLOGY & ADDITIONAL STUDIES:    IMPRESSION AND PLAN:    CAD and ischemic cardiomyopathy   Septic Knee. MRI showed left knee effusion   MSSA bacteremia. JOHN PAUL showed no evidence of endocarditis    - Management as per primary team  - Euvolemic continue to monitor I&O/ Lasix prn  - DVT prophylaxis   - Continue ASA and Prasugrel   - Will follow

## 2021-05-14 NOTE — PROGRESS NOTE ADULT - SUBJECTIVE AND OBJECTIVE BOX
LOIDAFLOWER KLINE  65y, Male  Allergy: ACE inhibitors (Hives)  carvedilol (Other)  enalapril (Hives)  Entresto (Other)      LOS  18d    CHIEF COMPLAINT: Septic arthritis (14 May 2021 14:00)      INTERVAL EVENTS/HPI  - No acute events overnight  - T(F): , Max: 98.2 (05-14-21 @ 12:45)  - continued left knee pain -- last arthrocentesis 5/11 with improved WBC  - Tolerating medication    ROS  General: Denies rigors, nightsweats  HEENT: Denies headache, rhinorrhea, sore throat, eye pain  CV: Denies CP, palpitations  PULM: Denies wheezing, hemoptysis  GI: Denies hematemesis, hematochezia, melena  : Denies discharge, hematuria  MSK: Denies arthralgias, myalgias  SKIN: Denies rash, lesions  NEURO: Denies paresthesias, weakness  PSYCH: Denies depression, anxiety    VITALS:  T(F): 98.2, Max: 98.2 (05-14-21 @ 12:45)  HR: 74  BP: 122/60  RR: 20Vital Signs Last 24 Hrs  T(C): 36.8 (14 May 2021 12:45), Max: 36.8 (14 May 2021 12:45)  T(F): 98.2 (14 May 2021 12:45), Max: 98.2 (14 May 2021 12:45)  HR: 74 (14 May 2021 12:45) (74 - 77)  BP: 122/60 (14 May 2021 12:45) (113/59 - 122/60)  BP(mean): --  RR: 20 (14 May 2021 12:45) (18 - 20)  SpO2: --    PHYSICAL EXAM:  Gen: NAD, resting in bed  HEENT: Normocephalic, atraumatic  Neck: supple, no lymphadenopathy  CV: Regular rate & regular rhythm  Lungs: decreased BS at bases, no fremitus  Abdomen: Soft, BS present  Ext: Warm, well perfused  Neuro: non focal, awake  Skin: no rash, no erythema  Lines: no phlebitis    FH: Non-contributory  Social Hx: Non-contributory    TESTS & MEASUREMENTS:    05-13    x   |  x   |  x   ----------------------------<  x   4.8   |  x   |  x                 Culture - Acid Fast - Body Fluid w/Smear (collected 05-11-21 @ 11:54)  Source: .Body Fluid Knee Fluid    Culture - Body Fluid with Gram Stain (collected 05-09-21 @ 14:10)  Source: .Body Fluid Synovial Fluid  Gram Stain (05-09-21 @ 23:07):    Numerous polymorphonuclear leukocytes per low power field    No organisms seen per oil power field  Preliminary Report (05-10-21 @ 18:43):    No growth    Culture - Blood (collected 05-04-21 @ 05:33)  Source: .Blood None  Final Report (05-09-21 @ 18:00):    No Growth Final    Culture - Blood (collected 05-03-21 @ 07:47)  Source: .Blood None  Final Report (05-08-21 @ 18:00):    No Growth Final    Culture - Blood (collected 05-02-21 @ 06:11)  Source: .Blood None  Final Report (05-07-21 @ 18:01):    No Growth Final    Culture - Body Fluid with Gram Stain (collected 05-01-21 @ 16:43)  Source: Bile biliary drain  Gram Stain (05-02-21 @ 23:26):    Numerous polymorphonuclear leukocytes per low power field    No organisms seen per oil power field  Final Report (05-07-21 @ 17:49):    Few Pseudomonas aeruginosa    Few Enterococcus faecium (vancomycin resistant)  Organism: Pseudomonas aeruginosa  Enterococcus faecium (vancomycin resistant) (05-07-21 @ 17:49)  Organism: Enterococcus faecium (vancomycin resistant) (05-07-21 @ 17:49)      -  Ampicillin: R >8 Predicts results to ampicillin/sulbactam, amoxacillin-clavulanate and  piperacillin-tazobactam.      -  Daptomycin: SDD 4 The breakpoint for SDD (sensitive dose dependent)is based on a dosage regimen of 8-12 mg/kg administered every 24 h in adults and is intended for serious infections due to E. faecium. Consultation with an infectious diseases specialist is recommended.      -  Levofloxacin: R >4      -  Linezolid: S 1      -  Tetra/Doxy: S <=1      -  Vancomycin: R >16      Method Type: YOLIE  Organism: Pseudomonas aeruginosa (05-07-21 @ 17:49)      -  Amikacin: S <=16      -  Aztreonam: S <=4      -  Cefepime: S <=2      -  Ceftazidime: S 4      -  Ciprofloxacin: S <=0.25      -  Gentamicin: S 4      -  Imipenem: S <=1      -  Levofloxacin: S <=0.5      -  Meropenem: S <=1      -  Piperacillin/Tazobactam: S <=8      -  Tobramycin: S <=2      Method Type: YOLIE    Culture - Blood (collected 05-01-21 @ 06:49)  Source: .Blood None  Final Report (05-06-21 @ 18:00):    No Growth Final    Culture - Blood (collected 05-01-21 @ 06:49)  Source: .Blood None  Final Report (05-06-21 @ 18:00):    No Growth Final    Culture - Blood (collected 04-30-21 @ 17:37)  Source: .Blood None  Final Report (05-05-21 @ 23:00):    No Growth Final    Culture - Blood (collected 04-30-21 @ 11:15)  Source: .Blood None  Final Report (05-05-21 @ 23:00):    No Growth Final    Culture - Blood (collected 04-28-21 @ 08:13)  Source: .Blood Blood  Gram Stain (04-29-21 @ 15:21):    Growth in anaerobic bottle: Gram Positive Cocci in Clusters  Final Report (04-30-21 @ 14:03):    Growth in anaerobic bottle: Staphylococcus aureus    See previous culture [id number]    Culture - Blood (collected 04-28-21 @ 08:13)  Source: .Blood Blood  Gram Stain (04-30-21 @ 02:25):    Growth in anaerobic bottle: Gram Positive Cocci in Clusters  Final Report (04-30-21 @ 20:23):    Growth in anaerobic bottle: Staphylococcus aureus    See previous culture [id number]    Culture - Blood (collected 04-27-21 @ 06:14)  Source: .Blood None  Final Report (05-02-21 @ 13:01):    No Growth Final    Culture - Surgical Swab (collected 04-26-21 @ 08:52)  Source: .Surgical Swab None  Final Report (05-01-21 @ 17:29):    Moderate Staphylococcus aureus  Organism: Staphylococcus aureus (05-01-21 @ 17:29)  Organism: Staphylococcus aureus (05-01-21 @ 17:29)      -  Ampicillin/Sulbactam: S <=8/4      -  Cefazolin: S <=4      -  Clindamycin: R <=0.25 This isolate is presumed to be clindamycin resistant based on detection of inducible resistance. Clindamycin may still be effective in some patients.      -  Erythromycin: R >4      -  Gentamicin: S <=1 Should not be used as monotherapy      -  Oxacillin: S <=0.25      -  Penicillin: R 4      -  RIF- Rifampin: S <=1 Should not be used as monotherapy      -  Tetra/Doxy: S <=1      -  Trimethoprim/Sulfamethoxazole: S <=0.5/9.5      -  Vancomycin: S 1      Method Type: YOLIE    Culture - Body Fluid with Gram Stain (collected 04-26-21 @ 01:15)  Source: .Body Fluid Synovial Fluid  Gram Stain (04-26-21 @ 11:35):    polymorphonuclear leukocytes per low power field    No organisms seen per oil power field    by cytocentrifuge  Final Report (05-10-21 @ 10:35):    Numerous Staphylococcus aureus  Organism: Staphylococcus aureus (05-10-21 @ 10:35)  Organism: Staphylococcus aureus (05-10-21 @ 10:35)      -  Ampicillin/Sulbactam: S <=8/4      -  Cefazolin: S <=4      -  Clindamycin: R <=0.25 This isolate is presumed to be clindamycin resistant based on detection of inducible resistance. Clindamycin may still be effective in some patients.      -  Erythromycin: R >4      -  Gentamicin: S <=1 Should not be used as monotherapy      -  Oxacillin: S <=0.25      -  Penicillin: R 4      -  RIF- Rifampin: S <=1 Should not be used as monotherapy      -  Tetra/Doxy: S <=1      -  Trimethoprim/Sulfamethoxazole: S <=0.5/9.5      -  Vancomycin: S 2      Method Type: YOLIE    Culture - Urine (collected 04-26-21 @ 00:31)  Source: .Urine Clean Catch (Midstream)  Final Report (04-27-21 @ 10:57):    <10,000 CFU/mL Normal Urogenital Tricia    Culture - Blood (collected 04-26-21 @ 00:31)  Source: .Blood Blood  Gram Stain (04-27-21 @ 01:48):    Growth in aerobic bottle: Gram Positive Cocci in Clusters  Final Report (04-28-21 @ 16:38):    Growth in aerobic bottle: Staphylococcus aureus    ***Blood Panel PCR results on this specimen are available    approximately 3 hours after the Gram stain result.***    Gram stain, PCR, and/or culture results may not always    correspond due to difference in methodologies.    ************************************************************    This PCR assay was performed by multiplex PCR. This    Assay tests for 66 bacterial and resistance gene targets.    Please refer to the [facility] [employer] test directory    at [email or web address] for details.  Organism: Blood Culture PCR  Staphylococcus aureus (04-28-21 @ 16:38)  Organism: Staphylococcus aureus (04-28-21 @ 16:38)      -  Ampicillin/Sulbactam: S <=8/4      -  Cefazolin: S <=4      -  Clindamycin: R 0.5 This isolate is presumed to be clindamycin resistant based on detection of inducible resistance. Clindamycin may still be effective in some patients.      -  Erythromycin: R >4      -  Gentamicin: S <=1 Should not be used as monotherapy      -  Oxacillin: S <=0.25      -  Penicillin: R 4      -  RIF- Rifampin: S <=1 Should not be used as monotherapy      -  Tetra/Doxy: S <=1      -  Trimethoprim/Sulfamethoxazole: S <=0.5/9.5      -  Vancomycin: S 2      Method Type: YOLIE  Organism: Blood Culture PCR (04-28-21 @ 16:38)      -  Staphylococcus aureus: Detec Any isolate of Staphylococcus aureus from a blood culture is NOT considered a contaminant.      Method Type: PCR    Culture - Blood (collected 04-26-21 @ 00:31)  Source: .Blood Blood  Gram Stain (04-27-21 @ 12:53):    Growth in aerobic bottle: Gram Positive Cocci in Clusters    Growth in anaerobic bottle: Gram Positive Cocci in Clusters  Final Report (04-28-21 @ 16:39):    Growth in aerobic and anaerobic bottles: Staphylococcus aureus    See previous culture [id number]            INFECTIOUS DISEASES TESTING  COVID-19 PCR: NotDetec (05-02-21 @ 08:52)  COVID-19 PCR: Detected (04-29-21 @ 14:33)  COVID-19 PCR: NotDetec (04-26-21 @ 06:00)  COVID-19 PCR: NotDetec (03-12-21 @ 11:00)  COVID-19 PCR: Detected (02-05-21 @ 13:47)  COVID-19 PCR: NotDetec (10-13-20 @ 09:08)  COVID-19 PCR: NotDetec (10-03-20 @ 19:54)  COVID-19 PCR: NotDetec (09-01-20 @ 20:40)  COVID-19 PCR: NotDetec (07-30-20 @ 07:43)  COVID-19 PCR: NotDetec (07-16-20 @ 19:46)  COVID-19 PCR: NotDetec (05-17-20 @ 10:18)      INFLAMMATORY MARKERS  Sedimentation Rate, Erythrocyte: 106 mm/Hr (05-11-21 @ 07:50)  C-Reactive Protein, Serum: 108 mg/L (05-11-21 @ 07:50)  Sedimentation Rate, Erythrocyte: 116 mm/Hr (05-05-21 @ 05:12)  C-Reactive Protein, Serum: 116 mg/L (05-05-21 @ 05:12)      RADIOLOGY & ADDITIONAL TESTS:  I have personally reviewed the last available Chest xray  CXR      CT      CARDIOLOGY TESTING      MEDICATIONS  ascorbic acid 500 Oral daily  aspirin enteric coated 81 Oral daily  atorvastatin 80 Oral at bedtime  chlorhexidine 4% Liquid 1 Topical <User Schedule>  dextrose 40% Gel 15 Oral once  dextrose 5%. 1000 IV Continuous <Continuous>  dextrose 5%. 1000 IV Continuous <Continuous>  dextrose 50% Injectable 25 IV Push once  dextrose 50% Injectable 12.5 IV Push once  dextrose 50% Injectable 25 IV Push once  digoxin     Tablet 125 Oral daily  DULoxetine 90 Oral daily  enoxaparin Injectable 40 SubCutaneous daily  gabapentin 100 Oral every 8 hours  glucagon  Injectable 1 IntraMuscular once  insulin glargine Injectable (LANTUS) 25 SubCutaneous at bedtime  insulin lispro (ADMELOG) corrective regimen sliding scale  SubCutaneous three times a day before meals  metoprolol succinate ER 25 Oral daily  morphine ER Tablet 15 Oral two times a day  multivitamin/minerals 1 Oral daily  nafcillin  IVPB 2 IV Intermittent every 4 hours  nafcillin  IVPB     nystatin Cream 1 Topical two times a day  pantoprazole    Tablet 40 Oral before breakfast  polyethylene glycol 3350 17 Oral two times a day  prasugrel 10 Oral daily  senna 2 Oral at bedtime  spironolactone 25 Oral daily      WEIGHT  Weight (kg): 81 (05-05-21 @ 13:12)      ANTIBIOTICS:  nafcillin  IVPB 2 Gram(s) IV Intermittent every 4 hours  nafcillin  IVPB          All available historical records have been reviewed

## 2021-05-14 NOTE — PROGRESS NOTE ADULT - ASSESSMENT
A/P:-  Pt is seen and evaluated by bedside.     1. Septic arthritis of Left knee with severe synovitis, s/p washout with recurrent effusion, s/p arthrocentesis 05/11 --sample compromised    2. 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 MSSA bacteremia   4. 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 Chronic iron deficiency anemia with component of ACD  6. DMII   7. Chronic HFrEF s/p AICD   8. CAD    - XR bilateral knees (04/26) large joint effusion in left knee  - s/p arthroscopic drainage of left knee on 04/26 and OR washout 4/26 - 20mL purulent fluid in left knee -  - Synovial Culture (04/26) MSSA  s/p  5/11 Arthrocentesis yielded 5 ml bloody fluid-Specimen compromised though   - Blood Culture (04/26) x2, 04/27 NGTD, 04/28 x2 SJ, 04/30-05/01 NGTD, 05/02 and 05/03 received  - JOHN PAUL  - no endocarditis noted   - currently on nefcillin 2 q4 -->plan to discharge on cefazolin 2 q8 as per ID for total of 6 weeks   - needs repeat MRI of knee for posterior collection   - continue with pain control   - History of acute cholecystitis s/p cholecystostomy tube placement in February 2020 s/p removal in May 2020, with fistula formation and persistent bilious drainage from the prior tract site as well as a RUQ abdominal abscess at the site s/p drainage  - Previous Intraabdominal 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 Decreased area of right upper quadrant subcutaneous stranding at site of prior percutaneous cholecystostomy, without evidence of abscess.  - HIDA scan 05/02: cannot visualize GB  - as per advance GI Dr. Dixon outpatient ERCP and cholecystectomy to divert bile and heal fistula, recommended vac wound  - no further intervention as per IR and surgery   - hb level stable at this time - no active bleeding noted   - Last a1c 9.1 04/29  - off insulin pump, as he has lost it   - Endocrinology reconsulted, Basal bolus insulin ordered However dropped his BS 5/12 after receiving short acting insulin, now refusing even lower dose of short acting insulin   titrate lantus to 25 from 30 U at night     - EP on board: s/p interrogation of AICD 04/28 (normally functioning)  - Cardiology Dr Nelson is on board  - Continue Lasix PO 40mg PRN and aldactone 25mg QD   - Continue Digoxin 125mg QD  - Continue Aspirin 81mg/ Prasugrel 10mg, Rosuvastatin 40mg and toprol       DC  home?  pending Ortho clearance

## 2021-05-14 NOTE — PROGRESS NOTE ADULT - SUBJECTIVE AND OBJECTIVE BOX
Patient is a 65y old  Male who presents with a chief complaint of Septic arthritis (05 May 2021 07:23)      S   L knee pain still there and not bending his knee much   BS improving  No more hypoglycemia       Vital Signs Last 24 Hrs  T(C): 36.8 (05 May 2021 12:00), Max: 36.8 (04 May 2021 21:35)  T(F): 98.3 (05 May 2021 12:00), Max: 98.3 (04 May 2021 21:35)  HR: 77 (05 May 2021 12:00) (77 - 79)  BP: 101/56 (05 May 2021 12:00) (101/56 - 118/62)  BP(mean): --  RR: 16 (05 May 2021 12:00) (16 - 18)  SpO2: --    PHYSICAL EXAM:   NAD; Normocephalic;   LUNGS - no wheezing  HEART: S1 S2+   ABDOMEN: Soft, Nontender, non distended  EXTREMITIES: no cyanosis; no edema L knee dressed   NERVOUS SYSTEM:  Awake and alert; no focal neuro deficits appreciated      LABS:                        8.0    10.62 )-----------( 374      ( 05 May 2021 05:12 )             27.3     05-05    135  |  99  |  17  ----------------------------<  346<H>  4.3   |  24  |  0.9    Ca    8.2<L>      05 May 2021 05:12  Mg     2.0     05-04    TPro  6.3  /  Alb  2.7<L>  /  TBili  0.5  /  DBili  x   /  AST  12  /  ALT  14  /  AlkPhos  95  05-05        CAPILLARY BLOOD GLUCOSE

## 2021-05-14 NOTE — PROGRESS NOTE ADULT - ASSESSMENT
This isASSESSMENT  65 year old male with PMH of CAD s/p MI, PCI/CABG, CHFrEF (15-20%), has ICD, HTN, celiac disease, DM, neuropathy, chronic b/l LE ulcers presents, severe chronic pain,  hx infected R TKR with MRSA (was eventually cemented so has limited ROM R knee), and history of cholecystostomy tube placement in February 2020 for acute cholecystitis s/p removal in May 2020 with multiple presentations including persistent bilious drainage from the prior tract site as well as a RUQ abdominal abscess at the site s/p drainage who presents with left knee pain    IMPRESSION  #Septic Arthritis of left knee with MSSA bacteremia  - s/p arthrocentesis of left knee - consistent with baterial septic infection   - s/p OR washout 4/26 -- purulent fluid in left knee with significant tricompartmental arthritis   - Blood Cx 4/26 MSSA  - OR Cx 4/26 MSSA  - Blood Cx 4/27 NG, 4/28 growing Staph   - Blood Cx 4/30-5/4 NG  - JOHN PAUL 5/5: no evidence of valvular or lead vegation; noted mild, non-mobile atheroma seen in the thoracic aorta.   - MR Knee w/wo IV Cont, Left (05.06.21 @ 19:05): Septic arthritis of the knee with osteomyelitis in the distal femur and proximal tibia. Associated large joint effusion with extensive synovitis. Noadditional collection identified.  - s/p knee tap 5/9 -- 15 cc of blood fluid; WBC 40109 (95% PMN)  - s/p knee tap 5/11 WBC 90822 (97% PMN)    #Biliary Drainage from previous perc sudhir site  - Previous Intraabdominal 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 Decreased area of right upper quadrant subcutaneous stranding at site of prior percutaneous cholecystostomy, without evidence of abscess.  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    #CHF s/p ICD  #DM   #Abx allergy: carvedilol (Other)  enalapril (Hives)  Entresto (Other)    Creatinine, Serum: 1.0 mg/dL (04.25.21 @ 22:15)  Weight (kg): 81 (26 Apr 2021 17:23)    RECOMMENDATIONS  - nafcillin 2g q 4 hours  - serial arthrocentesis per ortho to trend WBC  - pain control per primary  - given large joint effusion seen on MRI 5/6 -- would plan 6 weeks of antibiotics from time of last drainage (5/11)  - will eventually need PICC to finish course with cefazoling 2g q 8 hours    Please call or message on [employer] Teams if with any questions.  Spectra [phone]

## 2021-05-15 LAB
ALBUMIN SERPL ELPH-MCNC: 2.7 G/DL — LOW (ref 3.5–5.2)
ALP SERPL-CCNC: 87 U/L — SIGNIFICANT CHANGE UP (ref 30–115)
ALT FLD-CCNC: 10 U/L — SIGNIFICANT CHANGE UP (ref 0–41)
ANION GAP SERPL CALC-SCNC: 11 MMOL/L — SIGNIFICANT CHANGE UP (ref 7–14)
AST SERPL-CCNC: 16 U/L — SIGNIFICANT CHANGE UP (ref 0–41)
BILIRUB SERPL-MCNC: 0.5 MG/DL — SIGNIFICANT CHANGE UP (ref 0.2–1.2)
BUN SERPL-MCNC: 16 MG/DL — SIGNIFICANT CHANGE UP (ref 10–20)
CALCIUM SERPL-MCNC: 8.5 MG/DL — SIGNIFICANT CHANGE UP (ref 8.5–10.1)
CHLORIDE SERPL-SCNC: 97 MMOL/L — LOW (ref 98–110)
CO2 SERPL-SCNC: 25 MMOL/L — SIGNIFICANT CHANGE UP (ref 17–32)
CREAT SERPL-MCNC: 1 MG/DL — SIGNIFICANT CHANGE UP (ref 0.7–1.5)
CRP SERPL-MCNC: 81 MG/L — HIGH
ERYTHROCYTE [SEDIMENTATION RATE] IN BLOOD: 106 MM/HR — HIGH (ref 0–10)
GLUCOSE BLDC GLUCOMTR-MCNC: 131 MG/DL — HIGH (ref 70–99)
GLUCOSE BLDC GLUCOMTR-MCNC: 188 MG/DL — HIGH (ref 70–99)
GLUCOSE BLDC GLUCOMTR-MCNC: 89 MG/DL — SIGNIFICANT CHANGE UP (ref 70–99)
GLUCOSE SERPL-MCNC: 148 MG/DL — HIGH (ref 70–99)
HCT VFR BLD CALC: 27.9 % — LOW (ref 42–52)
HGB BLD-MCNC: 8.1 G/DL — LOW (ref 14–18)
MCHC RBC-ENTMCNC: 20.6 PG — LOW (ref 27–31)
MCHC RBC-ENTMCNC: 29 G/DL — LOW (ref 32–37)
MCV RBC AUTO: 70.8 FL — LOW (ref 80–94)
NRBC # BLD: 0 /100 WBCS — SIGNIFICANT CHANGE UP (ref 0–0)
PLATELET # BLD AUTO: 529 K/UL — HIGH (ref 130–400)
POTASSIUM SERPL-MCNC: 4.6 MMOL/L — SIGNIFICANT CHANGE UP (ref 3.5–5)
POTASSIUM SERPL-SCNC: 4.6 MMOL/L — SIGNIFICANT CHANGE UP (ref 3.5–5)
PROT SERPL-MCNC: 6.5 G/DL — SIGNIFICANT CHANGE UP (ref 6–8)
RBC # BLD: 3.94 M/UL — LOW (ref 4.7–6.1)
RBC # FLD: 21.2 % — HIGH (ref 11.5–14.5)
SODIUM SERPL-SCNC: 133 MMOL/L — LOW (ref 135–146)
WBC # BLD: 8.57 K/UL — SIGNIFICANT CHANGE UP (ref 4.8–10.8)
WBC # FLD AUTO: 8.57 K/UL — SIGNIFICANT CHANGE UP (ref 4.8–10.8)

## 2021-05-15 PROCEDURE — 99232 SBSQ HOSP IP/OBS MODERATE 35: CPT

## 2021-05-15 RX ADMIN — SPIRONOLACTONE 25 MILLIGRAM(S): 25 TABLET, FILM COATED ORAL at 05:05

## 2021-05-15 RX ADMIN — NAFCILLIN 200 GRAM(S): 10 INJECTION, POWDER, FOR SOLUTION INTRAVENOUS at 05:09

## 2021-05-15 RX ADMIN — MORPHINE SULFATE 4 MILLIGRAM(S): 50 CAPSULE, EXTENDED RELEASE ORAL at 00:22

## 2021-05-15 RX ADMIN — SENNA PLUS 2 TABLET(S): 8.6 TABLET ORAL at 21:18

## 2021-05-15 RX ADMIN — PANTOPRAZOLE SODIUM 40 MILLIGRAM(S): 20 TABLET, DELAYED RELEASE ORAL at 06:29

## 2021-05-15 RX ADMIN — MORPHINE SULFATE 15 MILLIGRAM(S): 50 CAPSULE, EXTENDED RELEASE ORAL at 17:16

## 2021-05-15 RX ADMIN — MORPHINE SULFATE 15 MILLIGRAM(S): 50 CAPSULE, EXTENDED RELEASE ORAL at 05:04

## 2021-05-15 RX ADMIN — ATORVASTATIN CALCIUM 80 MILLIGRAM(S): 80 TABLET, FILM COATED ORAL at 21:17

## 2021-05-15 RX ADMIN — MORPHINE SULFATE 4 MILLIGRAM(S): 50 CAPSULE, EXTENDED RELEASE ORAL at 21:58

## 2021-05-15 RX ADMIN — GABAPENTIN 100 MILLIGRAM(S): 400 CAPSULE ORAL at 16:26

## 2021-05-15 RX ADMIN — MORPHINE SULFATE 15 MILLIGRAM(S): 50 CAPSULE, EXTENDED RELEASE ORAL at 17:17

## 2021-05-15 RX ADMIN — NAFCILLIN 200 GRAM(S): 10 INJECTION, POWDER, FOR SOLUTION INTRAVENOUS at 21:20

## 2021-05-15 RX ADMIN — MORPHINE SULFATE 15 MILLIGRAM(S): 50 CAPSULE, EXTENDED RELEASE ORAL at 05:34

## 2021-05-15 RX ADMIN — NAFCILLIN 200 GRAM(S): 10 INJECTION, POWDER, FOR SOLUTION INTRAVENOUS at 17:16

## 2021-05-15 RX ADMIN — NYSTATIN CREAM 1 APPLICATION(S): 100000 CREAM TOPICAL at 05:05

## 2021-05-15 RX ADMIN — POLYETHYLENE GLYCOL 3350 17 GRAM(S): 17 POWDER, FOR SOLUTION ORAL at 05:05

## 2021-05-15 RX ADMIN — Medication 500 MILLIGRAM(S): at 12:31

## 2021-05-15 RX ADMIN — DULOXETINE HYDROCHLORIDE 90 MILLIGRAM(S): 30 CAPSULE, DELAYED RELEASE ORAL at 12:30

## 2021-05-15 RX ADMIN — Medication 25 MILLIGRAM(S): at 05:04

## 2021-05-15 RX ADMIN — MORPHINE SULFATE 4 MILLIGRAM(S): 50 CAPSULE, EXTENDED RELEASE ORAL at 21:33

## 2021-05-15 RX ADMIN — Medication 125 MICROGRAM(S): at 05:04

## 2021-05-15 RX ADMIN — NAFCILLIN 200 GRAM(S): 10 INJECTION, POWDER, FOR SOLUTION INTRAVENOUS at 01:46

## 2021-05-15 RX ADMIN — PRASUGREL 10 MILLIGRAM(S): 5 TABLET, FILM COATED ORAL at 12:30

## 2021-05-15 RX ADMIN — MORPHINE SULFATE 4 MILLIGRAM(S): 50 CAPSULE, EXTENDED RELEASE ORAL at 12:43

## 2021-05-15 RX ADMIN — INSULIN GLARGINE 25 UNIT(S): 100 INJECTION, SOLUTION SUBCUTANEOUS at 21:26

## 2021-05-15 RX ADMIN — MORPHINE SULFATE 4 MILLIGRAM(S): 50 CAPSULE, EXTENDED RELEASE ORAL at 08:31

## 2021-05-15 RX ADMIN — NYSTATIN CREAM 1 APPLICATION(S): 100000 CREAM TOPICAL at 17:16

## 2021-05-15 RX ADMIN — Medication 81 MILLIGRAM(S): at 12:31

## 2021-05-15 RX ADMIN — ENOXAPARIN SODIUM 40 MILLIGRAM(S): 100 INJECTION SUBCUTANEOUS at 12:31

## 2021-05-15 RX ADMIN — Medication 1 TABLET(S): at 12:30

## 2021-05-15 RX ADMIN — POLYETHYLENE GLYCOL 3350 17 GRAM(S): 17 POWDER, FOR SOLUTION ORAL at 17:16

## 2021-05-15 RX ADMIN — GABAPENTIN 100 MILLIGRAM(S): 400 CAPSULE ORAL at 05:04

## 2021-05-15 RX ADMIN — GABAPENTIN 100 MILLIGRAM(S): 400 CAPSULE ORAL at 21:17

## 2021-05-15 NOTE — PROGRESS NOTE ADULT - ASSESSMENT
A/P:-      1. Septic arthritis of Left knee with severe synovitis, s/p washout with recurrent effusion, s/p arthrocentesis 05/11 --sample compromised    2. 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 MSSA bacteremia   4. 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 Chronic iron deficiency anemia with component of ACD  6. DMII   7. Chronic HFrEF s/p AICD   8. CAD    - XR bilateral knees (04/26) large joint effusion in left knee  - s/p arthroscopic drainage of left knee on 04/26 and OR washout 4/26 - 20mL purulent fluid in left knee -  - Synovial Culture (04/26) MSSA  s/p  5/11 Arthrocentesis yielded 5 ml bloody fluid-Specimen compromised though   - Blood Culture (04/26) x2, 04/27 NGTD, 04/28 x2 SJ, 04/30-05/01 NGTD, 05/02 and 05/03 received  - JOHN PAUL  - no endocarditis noted   - currently on nefcillin 2 q4 -->plan to discharge on cefazolin 2 q8 as per ID for total of 6 weeks   - continue with pain control   - History of acute cholecystitis s/p cholecystostomy tube placement in February 2020 s/p removal in May 2020, with fistula formation and persistent bilious drainage from the prior tract site as well as a RUQ abdominal abscess at the site s/p drainage  - Previous Intraabdominal 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 Decreased area of right upper quadrant subcutaneous stranding at site of prior percutaneous cholecystostomy, without evidence of abscess.  - HIDA scan 05/02: cannot visualize GB  - as per [facility] Dr. Dixon outpatient ERCP and cholecystectomy to divert bile and heal fistula, recommended vac wound  - no further intervention as per IR and surgery   - hb level stable at this time - no active bleeding noted   - Last a1c 9.1 04/29  - off insulin pump, as he has lost it   - Endocrinology reconsulted, Basal bolus insulin ordered However dropped his BS 5/12 after receiving short acting insulin, now refusing even lower dose of short acting insulin   titrate lantus to 25 from 30 U at night     - EP on board: s/p interrogation of AICD 04/28 (normally functioning)  - Cardiology Dr Nelson is on board  - Continue Lasix PO 40mg PRN and aldactone 25mg QD   - Continue Digoxin 125mg QD  - Continue Aspirin 81mg/ Prasugrel 10mg, Rosuvastatin 40mg and toprol       DC  home?  pending Ortho clearance   Patient not interested even sitting in the chair. He starts to cry if advised so

## 2021-05-15 NOTE — PROGRESS NOTE ADULT - SUBJECTIVE AND OBJECTIVE BOX
Patient is a 65y old  Male who presents with a chief complaint of Septic arthritis       S   L knee pain still there   Not motivated to do PT at all   Doesnt want to sit in chair  When i advised to sit in chair he started crying   His collection bag is missing this am       Vital Signs Last 24 Hrs  T(C): 36.8 (05 May 2021 12:00), Max: 36.8 (04 May 2021 21:35)  T(F): 98.3 (05 May 2021 12:00), Max: 98.3 (04 May 2021 21:35)  HR: 77 (05 May 2021 12:00) (77 - 79)  BP: 101/56 (05 May 2021 12:00) (101/56 - 118/62)  BP(mean): --  RR: 16 (05 May 2021 12:00) (16 - 18)  SpO2: --    PHYSICAL EXAM:   NAD; Normocephalic;   LUNGS - no wheezing  HEART: S1 S2+   ABDOMEN: Soft, Nontender, non distended  EXTREMITIES: no cyanosis; no edema L knee dressed   NERVOUS SYSTEM:  Awake and alert; no focal neuro deficits appreciated      LABS:                        8.0    10.62 )-----------( 374      ( 05 May 2021 05:12 )             27.3     05-05    135  |  99  |  17  ----------------------------<  346<H>  4.3   |  24  |  0.9    Ca    8.2<L>      05 May 2021 05:12  Mg     2.0     05-04    TPro  6.3  /  Alb  2.7<L>  /  TBili  0.5  /  DBili  x   /  AST  12  /  ALT  14  /  AlkPhos  95  05-05        CAPILLARY BLOOD GLUCOSE          MEDICATIONS  (STANDING):  ascorbic acid 500 milliGRAM(s) Oral daily  aspirin enteric coated 81 milliGRAM(s) Oral daily  atorvastatin 80 milliGRAM(s) Oral at bedtime  chlorhexidine 4% Liquid 1 Application(s) Topical <User Schedule>  dextrose 40% Gel 15 Gram(s) Oral once  dextrose 5%. 1000 milliLiter(s) (50 mL/Hr) IV Continuous <Continuous>  dextrose 5%. 1000 milliLiter(s) (100 mL/Hr) IV Continuous <Continuous>  dextrose 50% Injectable 25 Gram(s) IV Push once  dextrose 50% Injectable 12.5 Gram(s) IV Push once  dextrose 50% Injectable 25 Gram(s) IV Push once  digoxin     Tablet 125 MICROGram(s) Oral daily  DULoxetine 90 milliGRAM(s) Oral daily  enoxaparin Injectable 40 milliGRAM(s) SubCutaneous daily  gabapentin 100 milliGRAM(s) Oral every 8 hours  glucagon  Injectable 1 milliGRAM(s) IntraMuscular once  insulin glargine Injectable (LANTUS) 25 Unit(s) SubCutaneous at bedtime  insulin lispro (ADMELOG) corrective regimen sliding scale   SubCutaneous three times a day before meals  metoprolol succinate ER 25 milliGRAM(s) Oral daily  morphine ER Tablet 15 milliGRAM(s) Oral two times a day  multivitamin/minerals 1 Tablet(s) Oral daily  nafcillin  IVPB 2 Gram(s) IV Intermittent every 4 hours  nafcillin  IVPB      nystatin Cream 1 Application(s) Topical two times a day  pantoprazole    Tablet 40 milliGRAM(s) Oral before breakfast  polyethylene glycol 3350 17 Gram(s) Oral two times a day  prasugrel 10 milliGRAM(s) Oral daily  senna 2 Tablet(s) Oral at bedtime  spironolactone 25 milliGRAM(s) Oral daily    MEDICATIONS  (PRN):  acetaminophen   Tablet .. 650 milliGRAM(s) Oral every 6 hours PRN Temp greater or equal to 38C (100.4F), Mild Pain (1 - 3)  furosemide    Tablet 40 milliGRAM(s) Oral daily PRN fluid overload  melatonin 10 milliGRAM(s) Oral at bedtime PRN Insomnia  morphine  - Injectable 4 milliGRAM(s) IV Push every 4 hours PRN Severe Pain (7 - 10)

## 2021-05-16 LAB
GLUCOSE BLDC GLUCOMTR-MCNC: 102 MG/DL — HIGH (ref 70–99)
GLUCOSE BLDC GLUCOMTR-MCNC: 124 MG/DL — HIGH (ref 70–99)
GLUCOSE BLDC GLUCOMTR-MCNC: 133 MG/DL — HIGH (ref 70–99)
GLUCOSE BLDC GLUCOMTR-MCNC: 95 MG/DL — SIGNIFICANT CHANGE UP (ref 70–99)

## 2021-05-16 PROCEDURE — 99232 SBSQ HOSP IP/OBS MODERATE 35: CPT

## 2021-05-16 RX ORDER — METHOCARBAMOL 500 MG/1
500 TABLET, FILM COATED ORAL EVERY 4 HOURS
Refills: 0 | Status: COMPLETED | OUTPATIENT
Start: 2021-05-16 | End: 2021-05-16

## 2021-05-16 RX ADMIN — Medication 500 MILLIGRAM(S): at 11:20

## 2021-05-16 RX ADMIN — MORPHINE SULFATE 15 MILLIGRAM(S): 50 CAPSULE, EXTENDED RELEASE ORAL at 07:08

## 2021-05-16 RX ADMIN — MORPHINE SULFATE 4 MILLIGRAM(S): 50 CAPSULE, EXTENDED RELEASE ORAL at 21:34

## 2021-05-16 RX ADMIN — PANTOPRAZOLE SODIUM 40 MILLIGRAM(S): 20 TABLET, DELAYED RELEASE ORAL at 05:02

## 2021-05-16 RX ADMIN — METHOCARBAMOL 500 MILLIGRAM(S): 500 TABLET, FILM COATED ORAL at 10:23

## 2021-05-16 RX ADMIN — NAFCILLIN 200 GRAM(S): 10 INJECTION, POWDER, FOR SOLUTION INTRAVENOUS at 02:33

## 2021-05-16 RX ADMIN — MORPHINE SULFATE 4 MILLIGRAM(S): 50 CAPSULE, EXTENDED RELEASE ORAL at 07:56

## 2021-05-16 RX ADMIN — Medication 10 MILLIGRAM(S): at 02:39

## 2021-05-16 RX ADMIN — ATORVASTATIN CALCIUM 80 MILLIGRAM(S): 80 TABLET, FILM COATED ORAL at 21:36

## 2021-05-16 RX ADMIN — NYSTATIN CREAM 1 APPLICATION(S): 100000 CREAM TOPICAL at 05:03

## 2021-05-16 RX ADMIN — DULOXETINE HYDROCHLORIDE 90 MILLIGRAM(S): 30 CAPSULE, DELAYED RELEASE ORAL at 11:20

## 2021-05-16 RX ADMIN — MORPHINE SULFATE 15 MILLIGRAM(S): 50 CAPSULE, EXTENDED RELEASE ORAL at 19:35

## 2021-05-16 RX ADMIN — MORPHINE SULFATE 15 MILLIGRAM(S): 50 CAPSULE, EXTENDED RELEASE ORAL at 17:07

## 2021-05-16 RX ADMIN — PRASUGREL 10 MILLIGRAM(S): 5 TABLET, FILM COATED ORAL at 11:21

## 2021-05-16 RX ADMIN — CHLORHEXIDINE GLUCONATE 1 APPLICATION(S): 213 SOLUTION TOPICAL at 05:01

## 2021-05-16 RX ADMIN — NAFCILLIN 200 GRAM(S): 10 INJECTION, POWDER, FOR SOLUTION INTRAVENOUS at 05:02

## 2021-05-16 RX ADMIN — Medication 125 MICROGRAM(S): at 05:01

## 2021-05-16 RX ADMIN — NAFCILLIN 200 GRAM(S): 10 INJECTION, POWDER, FOR SOLUTION INTRAVENOUS at 13:55

## 2021-05-16 RX ADMIN — ENOXAPARIN SODIUM 40 MILLIGRAM(S): 100 INJECTION SUBCUTANEOUS at 11:21

## 2021-05-16 RX ADMIN — GABAPENTIN 100 MILLIGRAM(S): 400 CAPSULE ORAL at 05:01

## 2021-05-16 RX ADMIN — POLYETHYLENE GLYCOL 3350 17 GRAM(S): 17 POWDER, FOR SOLUTION ORAL at 05:03

## 2021-05-16 RX ADMIN — Medication 1 TABLET(S): at 11:20

## 2021-05-16 RX ADMIN — MORPHINE SULFATE 4 MILLIGRAM(S): 50 CAPSULE, EXTENDED RELEASE ORAL at 02:39

## 2021-05-16 RX ADMIN — GABAPENTIN 100 MILLIGRAM(S): 400 CAPSULE ORAL at 13:55

## 2021-05-16 RX ADMIN — INSULIN GLARGINE 25 UNIT(S): 100 INJECTION, SOLUTION SUBCUTANEOUS at 21:35

## 2021-05-16 RX ADMIN — MORPHINE SULFATE 4 MILLIGRAM(S): 50 CAPSULE, EXTENDED RELEASE ORAL at 03:08

## 2021-05-16 RX ADMIN — METHOCARBAMOL 500 MILLIGRAM(S): 500 TABLET, FILM COATED ORAL at 13:55

## 2021-05-16 RX ADMIN — MORPHINE SULFATE 4 MILLIGRAM(S): 50 CAPSULE, EXTENDED RELEASE ORAL at 14:29

## 2021-05-16 RX ADMIN — MORPHINE SULFATE 15 MILLIGRAM(S): 50 CAPSULE, EXTENDED RELEASE ORAL at 06:24

## 2021-05-16 RX ADMIN — POLYETHYLENE GLYCOL 3350 17 GRAM(S): 17 POWDER, FOR SOLUTION ORAL at 17:08

## 2021-05-16 RX ADMIN — NAFCILLIN 200 GRAM(S): 10 INJECTION, POWDER, FOR SOLUTION INTRAVENOUS at 10:23

## 2021-05-16 RX ADMIN — Medication 81 MILLIGRAM(S): at 11:20

## 2021-05-16 RX ADMIN — NYSTATIN CREAM 1 APPLICATION(S): 100000 CREAM TOPICAL at 17:08

## 2021-05-16 RX ADMIN — SPIRONOLACTONE 25 MILLIGRAM(S): 25 TABLET, FILM COATED ORAL at 05:03

## 2021-05-16 RX ADMIN — GABAPENTIN 100 MILLIGRAM(S): 400 CAPSULE ORAL at 21:36

## 2021-05-16 RX ADMIN — NAFCILLIN 200 GRAM(S): 10 INJECTION, POWDER, FOR SOLUTION INTRAVENOUS at 21:38

## 2021-05-16 RX ADMIN — NAFCILLIN 200 GRAM(S): 10 INJECTION, POWDER, FOR SOLUTION INTRAVENOUS at 17:07

## 2021-05-16 RX ADMIN — Medication 25 MILLIGRAM(S): at 06:23

## 2021-05-16 NOTE — PROGRESS NOTE ADULT - ASSESSMENT
A/P:-  Pt is seen and evaluated by bedside.     1. Septic arthritis of Left knee with severe synovitis, s/p washout with recurrent effusion, s/p arthrocentesis 05/11 --sample compromised    2. 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 MSSA bacteremia   4. 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 Chronic iron deficiency anemia with component of ACD  6. DMII   7. Chronic HFrEF s/p AICD   8. CAD    - XR bilateral knees (04/26) large joint effusion in left knee  - s/p arthroscopic drainage of left knee on 04/26 and OR washout 4/26 - 20mL purulent fluid in left knee -  - Synovial Culture (04/26) MSSA  s/p  5/11 Arthrocentesis yielded 5 ml bloody fluid-Specimen compromised though   - Blood Culture (04/26) x2, 04/27 NGTD, 04/28 x2 SJ, 04/30-05/01 NGTD, 05/02 and 05/03 received  - JOHN PAUL  - no endocarditis noted   - currently on nefcillin 2 q4 -->plan to discharge on cefazolin 2 q8 as per ID for total of 6 weeks   - needs repeat MRI of knee for posterior collection   - continue with pain control   - History of acute cholecystitis s/p cholecystostomy tube placement in February 2020 s/p removal in May 2020, with fistula formation and persistent bilious drainage from the prior tract site as well as a RUQ abdominal abscess at the site s/p drainage  - Previous Intraabdominal 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 Decreased area of right upper quadrant subcutaneous stranding at site of prior percutaneous cholecystostomy, without evidence of abscess.  - HIDA scan 05/02: cannot visualize GB  - as per [facility] Dr. Dixon outpatient ERCP and cholecystectomy to divert bile and heal fistula, recommended vac wound  - no further intervention as per IR and surgery   - hb level stable at this time - no active bleeding noted   - Last a1c 9.1 04/29  - off insulin pump, as he has lost it   - Endocrinology reconsulted, Basal bolus insulin ordered However dropped his BS 5/12 after receiving short acting insulin, now refusing even lower dose of short acting insulin   titrate lantus to 25 from 30 U at night     - EP on board: s/p interrogation of AICD 04/28 (normally functioning)  - Cardiology Dr Nelson is on board  - Continue Lasix PO 40mg PRN and aldactone 25mg QD   - Continue Digoxin 125mg QD  - Continue Aspirin 81mg/ Prasugrel 10mg, Rosuvastatin 40mg and toprol      plan for repeat aspiration per ortho noted   DC  home?  pending Ortho clearance              A/P:-  Pt is seen and evaluated by bedside.     1. Septic arthritis of Left knee with severe synovitis, s/p washout with recurrent effusion, s/p arthrocentesis 05/11 --sample compromised    2. 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 MSSA bacteremia   4. 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 Chronic iron deficiency anemia with component of ACD  6. DMII   7. Chronic HFrEF s/p AICD   8. CAD    - XR bilateral knees (04/26) large joint effusion in left knee  - s/p arthroscopic drainage of left knee on 04/26 and OR washout 4/26 - 20mL purulent fluid in left knee -  - Synovial Culture (04/26) MSSA  s/p  5/11 Arthrocentesis yielded 5 ml bloody fluid-Specimen compromised though   - Blood Culture (04/26) x2, 04/27 NGTD, 04/28 x2 SJ, 04/30-05/01 NGTD, 05/02 and 05/03 received  - JOHN PAUL  - no endocarditis noted   - currently on nefcillin 2 q4 -->plan to discharge on cefazolin 2 q8 as per ID for total of 6 weeks   - needs repeat MRI of knee for posterior collection   - continue with pain control   - History of acute cholecystitis s/p cholecystostomy tube placement in February 2020 s/p removal in May 2020, with fistula formation and persistent bilious drainage from the prior tract site as well as a RUQ abdominal abscess at the site s/p drainage  - Previous Intraabdominal 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 Decreased area of right upper quadrant subcutaneous stranding at site of prior percutaneous cholecystostomy, without evidence of abscess.  - HIDA scan 05/02: cannot visualize GB  - as per [facility] Dr. Dixon outpatient ERCP and cholecystectomy to divert bile and heal fistula, recommended vac wound  - no further intervention as per IR and surgery   - hb level stable at this time - no active bleeding noted   - Last a1c 9.1 04/29  - off insulin pump, as he has lost it   - Endocrinology reconsulted, Basal bolus insulin ordered However dropped his BS 5/12 after receiving short acting insulin, now refusing even lower dose of short acting insulin   titrate lantus to 25 from 30 U at night     - EP on board: s/p interrogation of AICD 04/28 (normally functioning)  - Cardiology Dr Nelson is on board  - Continue Lasix PO 40mg PRN and aldactone 25mg QD   - Continue Digoxin 125mg QD  - Continue Aspirin 81mg/ Prasugrel 10mg, Rosuvastatin 40mg and toprol     Plan for repeat aspiration per ortho noted   DC  home?  pending Ortho clearance

## 2021-05-16 NOTE — PROGRESS NOTE ADULT - SUBJECTIVE AND OBJECTIVE BOX
Patient is a 65y old  Male who presents with a chief complaint of Septic arthritis (05 May 2021 07:23)      S   L knee pain still there and not bending his knee much   BS improving  No more hypoglycemia       Vital Signs Last 24 Hrs  T(C): 36.8 (05 May 2021 12:00), Max: 36.8 (04 May 2021 21:35)  T(F): 98.3 (05 May 2021 12:00), Max: 98.3 (04 May 2021 21:35)  HR: 77 (05 May 2021 12:00) (77 - 79)  BP: 101/56 (05 May 2021 12:00) (101/56 - 118/62)  BP(mean): --  RR: 16 (05 May 2021 12:00) (16 - 18)  SpO2: --    PHYSICAL EXAM:   NAD; Normocephalic;   LUNGS - no wheezing  HEART: S1 S2+   ABDOMEN: Soft, Nontender, non distended  EXTREMITIES: no cyanosis; no edema L knee dressed   NERVOUS SYSTEM:  Awake and alert; no focal neuro deficits appreciated      LABS:                        8.0    10.62 )-----------( 374      ( 05 May 2021 05:12 )             27.3     05-05    135  |  99  |  17  ----------------------------<  346<H>  4.3   |  24  |  0.9    Ca    8.2<L>      05 May 2021 05:12  Mg     2.0     05-04    TPro  6.3  /  Alb  2.7<L>  /  TBili  0.5  /  DBili  x   /  AST  12  /  ALT  14  /  AlkPhos  95  05-05        CAPILLARY BLOOD GLUCOSE                   Patient is a 65y old  Male who presents with a chief complaint of Septic arthritis (05 May 2021 07:23)      S   L knee pain still there and not bending his knee much     No more hypoglycemia       Vital Signs Last 24 Hrs  T(C): 36.8 (05 May 2021 12:00), Max: 36.8 (04 May 2021 21:35)  T(F): 98.3 (05 May 2021 12:00), Max: 98.3 (04 May 2021 21:35)  HR: 77 (05 May 2021 12:00) (77 - 79)  BP: 101/56 (05 May 2021 12:00) (101/56 - 118/62)  BP(mean): --  RR: 16 (05 May 2021 12:00) (16 - 18)  SpO2: --    PHYSICAL EXAM:   NAD; Normocephalic;   LUNGS - no wheezing  HEART: S1 S2+   ABDOMEN: Soft, Nontender, non distended  EXTREMITIES: no cyanosis; no edema L knee dressed   NERVOUS SYSTEM:  Awake and alert; no focal neuro deficits appreciated      LABS:                        8.0    10.62 )-----------( 374      ( 05 May 2021 05:12 )             27.3     05-05    135  |  99  |  17  ----------------------------<  346<H>  4.3   |  24  |  0.9    Ca    8.2<L>      05 May 2021 05:12  Mg     2.0     05-04    TPro  6.3  /  Alb  2.7<L>  /  TBili  0.5  /  DBili  x   /  AST  12  /  ALT  14  /  AlkPhos  95  05-05        CAPILLARY BLOOD GLUCOSE

## 2021-05-17 LAB
GLUCOSE BLDC GLUCOMTR-MCNC: 103 MG/DL — HIGH (ref 70–99)
GLUCOSE BLDC GLUCOMTR-MCNC: 168 MG/DL — HIGH (ref 70–99)
GLUCOSE BLDC GLUCOMTR-MCNC: 86 MG/DL — SIGNIFICANT CHANGE UP (ref 70–99)
GLUCOSE BLDC GLUCOMTR-MCNC: 94 MG/DL — SIGNIFICANT CHANGE UP (ref 70–99)

## 2021-05-17 PROCEDURE — 99232 SBSQ HOSP IP/OBS MODERATE 35: CPT

## 2021-05-17 RX ADMIN — SENNA PLUS 2 TABLET(S): 8.6 TABLET ORAL at 21:11

## 2021-05-17 RX ADMIN — Medication 1 TABLET(S): at 11:14

## 2021-05-17 RX ADMIN — Medication 500 MILLIGRAM(S): at 11:14

## 2021-05-17 RX ADMIN — ENOXAPARIN SODIUM 40 MILLIGRAM(S): 100 INJECTION SUBCUTANEOUS at 11:14

## 2021-05-17 RX ADMIN — MORPHINE SULFATE 15 MILLIGRAM(S): 50 CAPSULE, EXTENDED RELEASE ORAL at 05:34

## 2021-05-17 RX ADMIN — MORPHINE SULFATE 4 MILLIGRAM(S): 50 CAPSULE, EXTENDED RELEASE ORAL at 01:27

## 2021-05-17 RX ADMIN — MORPHINE SULFATE 15 MILLIGRAM(S): 50 CAPSULE, EXTENDED RELEASE ORAL at 17:07

## 2021-05-17 RX ADMIN — NAFCILLIN 200 GRAM(S): 10 INJECTION, POWDER, FOR SOLUTION INTRAVENOUS at 17:07

## 2021-05-17 RX ADMIN — NAFCILLIN 200 GRAM(S): 10 INJECTION, POWDER, FOR SOLUTION INTRAVENOUS at 01:29

## 2021-05-17 RX ADMIN — Medication 125 MICROGRAM(S): at 05:34

## 2021-05-17 RX ADMIN — DULOXETINE HYDROCHLORIDE 90 MILLIGRAM(S): 30 CAPSULE, DELAYED RELEASE ORAL at 11:14

## 2021-05-17 RX ADMIN — Medication 25 MILLIGRAM(S): at 05:33

## 2021-05-17 RX ADMIN — MORPHINE SULFATE 4 MILLIGRAM(S): 50 CAPSULE, EXTENDED RELEASE ORAL at 13:40

## 2021-05-17 RX ADMIN — NAFCILLIN 200 GRAM(S): 10 INJECTION, POWDER, FOR SOLUTION INTRAVENOUS at 17:02

## 2021-05-17 RX ADMIN — INSULIN GLARGINE 25 UNIT(S): 100 INJECTION, SOLUTION SUBCUTANEOUS at 21:34

## 2021-05-17 RX ADMIN — GABAPENTIN 100 MILLIGRAM(S): 400 CAPSULE ORAL at 21:11

## 2021-05-17 RX ADMIN — Medication 650 MILLIGRAM(S): at 00:29

## 2021-05-17 RX ADMIN — NAFCILLIN 200 GRAM(S): 10 INJECTION, POWDER, FOR SOLUTION INTRAVENOUS at 05:34

## 2021-05-17 RX ADMIN — ATORVASTATIN CALCIUM 80 MILLIGRAM(S): 80 TABLET, FILM COATED ORAL at 21:12

## 2021-05-17 RX ADMIN — GABAPENTIN 100 MILLIGRAM(S): 400 CAPSULE ORAL at 05:34

## 2021-05-17 RX ADMIN — CHLORHEXIDINE GLUCONATE 1 APPLICATION(S): 213 SOLUTION TOPICAL at 05:35

## 2021-05-17 RX ADMIN — SPIRONOLACTONE 25 MILLIGRAM(S): 25 TABLET, FILM COATED ORAL at 05:34

## 2021-05-17 RX ADMIN — POLYETHYLENE GLYCOL 3350 17 GRAM(S): 17 POWDER, FOR SOLUTION ORAL at 05:34

## 2021-05-17 RX ADMIN — GABAPENTIN 100 MILLIGRAM(S): 400 CAPSULE ORAL at 16:22

## 2021-05-17 RX ADMIN — NYSTATIN CREAM 1 APPLICATION(S): 100000 CREAM TOPICAL at 17:07

## 2021-05-17 RX ADMIN — NAFCILLIN 200 GRAM(S): 10 INJECTION, POWDER, FOR SOLUTION INTRAVENOUS at 09:20

## 2021-05-17 RX ADMIN — MORPHINE SULFATE 4 MILLIGRAM(S): 50 CAPSULE, EXTENDED RELEASE ORAL at 13:10

## 2021-05-17 RX ADMIN — PRASUGREL 10 MILLIGRAM(S): 5 TABLET, FILM COATED ORAL at 11:14

## 2021-05-17 RX ADMIN — Medication 81 MILLIGRAM(S): at 11:14

## 2021-05-17 RX ADMIN — NAFCILLIN 200 GRAM(S): 10 INJECTION, POWDER, FOR SOLUTION INTRAVENOUS at 21:11

## 2021-05-17 NOTE — PROGRESS NOTE ADULT - SUBJECTIVE AND OBJECTIVE BOX
LOIDAFLOWER MADRIGAL  65y, Male  Allergy: ACE inhibitors (Hives)  carvedilol (Other)  enalapril (Hives)  Entresto (Other)      LOS  21d    CHIEF COMPLAINT: Septic arthritis (16 May 2021 13:32)      INTERVAL EVENTS/HPI  - No acute events overnight  - T(F): , Max: 98.2 (05-16-21 @ 12:18)  - continued left leg pain   - WBC Count: 8.57 (05-15-21 @ 20:59)     - Creatinine, Serum: 1.0 (05-15-21 @ 20:59)       ROS  General: Denies rigors, nightsweats  HEENT: Denies headache, rhinorrhea, sore throat, eye pain  CV: Denies CP, palpitations  PULM: Denies wheezing, hemoptysis  GI: Denies hematemesis, hematochezia, melena  : Denies discharge, hematuria  MSK: Denies arthralgias, myalgias  SKIN: Denies rash, lesions  NEURO: Denies paresthesias, weakness  PSYCH: Denies depression, anxiety    VITALS:  T(F): 97, Max: 98.2 (05-16-21 @ 12:18)  HR: 68  BP: 101/60  RR: 18Vital Signs Last 24 Hrs  T(C): 36.1 (17 May 2021 04:56), Max: 36.8 (16 May 2021 12:18)  T(F): 97 (17 May 2021 04:56), Max: 98.2 (16 May 2021 12:18)  HR: 68 (17 May 2021 04:56) (68 - 73)  BP: 101/60 (17 May 2021 04:56) (101/56 - 119/62)  BP(mean): --  RR: 18 (17 May 2021 04:56) (18 - 20)  SpO2: --    PHYSICAL EXAM:  Gen: NAD, resting in bed  HEENT: Normocephalic, atraumatic  Neck: supple, no lymphadenopathy  CV: Regular rate & regular rhythm  Lungs: decreased BS at bases, no fremitus  Abdomen: Soft, BS present  Ext: Warm, well perfused; Left knee with mild effusion, no erythema   Neuro: non focal, awake  Skin: no rash, no erythema  Lines: no phlebitis    FH: Non-contributory  Social Hx: Non-contributory    TESTS & MEASUREMENTS:                        8.1    8.57  )-----------( 529      ( 15 May 2021 20:59 )             27.9     05-15    133<L>  |  97<L>  |  16  ----------------------------<  148<H>  4.6   |  25  |  1.0    Ca    8.5      15 May 2021 20:59    TPro  6.5  /  Alb  2.7<L>  /  TBili  0.5  /  DBili  x   /  AST  16  /  ALT  10  /  AlkPhos  87  05-15      LIVER FUNCTIONS - ( 15 May 2021 20:59 )  Alb: 2.7 g/dL / Pro: 6.5 g/dL / ALK PHOS: 87 U/L / ALT: 10 U/L / AST: 16 U/L / GGT: x               Culture - Acid Fast - Body Fluid w/Smear (collected 05-11-21 @ 11:54)  Source: .Body Fluid Knee Fluid  Preliminary Report (05-15-21 @ 15:03):    Culture is being performed.    Culture - Body Fluid with Gram Stain (collected 05-09-21 @ 14:10)  Source: .Body Fluid Synovial Fluid  Gram Stain (05-09-21 @ 23:07):    Numerous polymorphonuclear leukocytes per low power field    No organisms seen per oil power field  Preliminary Report (05-10-21 @ 18:43):    No growth    Culture - Blood (collected 05-04-21 @ 05:33)  Source: .Blood None  Final Report (05-09-21 @ 18:00):    No Growth Final    Culture - Blood (collected 05-03-21 @ 07:47)  Source: .Blood None  Final Report (05-08-21 @ 18:00):    No Growth Final    Culture - Blood (collected 05-02-21 @ 06:11)  Source: .Blood None  Final Report (05-07-21 @ 18:01):    No Growth Final    Culture - Body Fluid with Gram Stain (collected 05-01-21 @ 16:43)  Source: Bile biliary drain  Gram Stain (05-02-21 @ 23:26):    Numerous polymorphonuclear leukocytes per low power field    No organisms seen per oil power field  Final Report (05-07-21 @ 17:49):    Few Pseudomonas aeruginosa    Few Enterococcus faecium (vancomycin resistant)  Organism: Pseudomonas aeruginosa  Enterococcus faecium (vancomycin resistant) (05-07-21 @ 17:49)  Organism: Enterococcus faecium (vancomycin resistant) (05-07-21 @ 17:49)      -  Ampicillin: R >8 Predicts results to ampicillin/sulbactam, amoxacillin-clavulanate and  piperacillin-tazobactam.      -  Daptomycin: SDD 4 The breakpoint for SDD (sensitive dose dependent)is based on a dosage regimen of 8-12 mg/kg administered every 24 h in adults and is intended for serious infections due to E. faecium. Consultation with an infectious diseases specialist is recommended.      -  Levofloxacin: R >4      -  Linezolid: S 1      -  Tetra/Doxy: S <=1      -  Vancomycin: R >16      Method Type: YOLIE  Organism: Pseudomonas aeruginosa (05-07-21 @ 17:49)      -  Amikacin: S <=16      -  Aztreonam: S <=4      -  Cefepime: S <=2      -  Ceftazidime: S 4      -  Ciprofloxacin: S <=0.25      -  Gentamicin: S 4      -  Imipenem: S <=1      -  Levofloxacin: S <=0.5      -  Meropenem: S <=1      -  Piperacillin/Tazobactam: S <=8      -  Tobramycin: S <=2      Method Type: YOLIE    Culture - Blood (collected 05-01-21 @ 06:49)  Source: .Blood None  Final Report (05-06-21 @ 18:00):    No Growth Final    Culture - Blood (collected 05-01-21 @ 06:49)  Source: .Blood None  Final Report (05-06-21 @ 18:00):    No Growth Final    Culture - Blood (collected 04-30-21 @ 17:37)  Source: .Blood None  Final Report (05-05-21 @ 23:00):    No Growth Final    Culture - Blood (collected 04-30-21 @ 11:15)  Source: .Blood None  Final Report (05-05-21 @ 23:00):    No Growth Final    Culture - Blood (collected 04-28-21 @ 08:13)  Source: .Blood Blood  Gram Stain (04-29-21 @ 15:21):    Growth in anaerobic bottle: Gram Positive Cocci in Clusters  Final Report (04-30-21 @ 14:03):    Growth in anaerobic bottle: Staphylococcus aureus    See previous culture [id number]    Culture - Blood (collected 04-28-21 @ 08:13)  Source: .Blood Blood  Gram Stain (04-30-21 @ 02:25):    Growth in anaerobic bottle: Gram Positive Cocci in Clusters  Final Report (04-30-21 @ 20:23):    Growth in anaerobic bottle: Staphylococcus aureus    See previous culture [id number]    Culture - Blood (collected 04-27-21 @ 06:14)  Source: .Blood None  Final Report (05-02-21 @ 13:01):    No Growth Final    Culture - Surgical Swab (collected 04-26-21 @ 08:52)  Source: .Surgical Swab None  Final Report (05-01-21 @ 17:29):    Moderate Staphylococcus aureus  Organism: Staphylococcus aureus (05-01-21 @ 17:29)  Organism: Staphylococcus aureus (05-01-21 @ 17:29)      -  Ampicillin/Sulbactam: S <=8/4      -  Cefazolin: S <=4      -  Clindamycin: R <=0.25 This isolate is presumed to be clindamycin resistant based on detection of inducible resistance. Clindamycin may still be effective in some patients.      -  Erythromycin: R >4      -  Gentamicin: S <=1 Should not be used as monotherapy      -  Oxacillin: S <=0.25      -  Penicillin: R 4      -  RIF- Rifampin: S <=1 Should not be used as monotherapy      -  Tetra/Doxy: S <=1      -  Trimethoprim/Sulfamethoxazole: S <=0.5/9.5      -  Vancomycin: S 1      Method Type: YOLIE    Culture - Body Fluid with Gram Stain (collected 04-26-21 @ 01:15)  Source: .Body Fluid Synovial Fluid  Gram Stain (04-26-21 @ 11:35):    polymorphonuclear leukocytes per low power field    No organisms seen per oil power field    by cytocentrifuge  Final Report (05-10-21 @ 10:35):    Numerous Staphylococcus aureus  Organism: Staphylococcus aureus (05-10-21 @ 10:35)  Organism: Staphylococcus aureus (05-10-21 @ 10:35)      -  Ampicillin/Sulbactam: S <=8/4      -  Cefazolin: S <=4      -  Clindamycin: R <=0.25 This isolate is presumed to be clindamycin resistant based on detection of inducible resistance. Clindamycin may still be effective in some patients.      -  Erythromycin: R >4      -  Gentamicin: S <=1 Should not be used as monotherapy      -  Oxacillin: S <=0.25      -  Penicillin: R 4      -  RIF- Rifampin: S <=1 Should not be used as monotherapy      -  Tetra/Doxy: S <=1      -  Trimethoprim/Sulfamethoxazole: S <=0.5/9.5      -  Vancomycin: S 2      Method Type: YOLIE    Culture - Urine (collected 04-26-21 @ 00:31)  Source: .Urine Clean Catch (Midstream)  Final Report (04-27-21 @ 10:57):    <10,000 CFU/mL Normal Urogenital Tricia    Culture - Blood (collected 04-26-21 @ 00:31)  Source: .Blood Blood  Gram Stain (04-27-21 @ 01:48):    Growth in aerobic bottle: Gram Positive Cocci in Clusters  Final Report (04-28-21 @ 16:38):    Growth in aerobic bottle: Staphylococcus aureus    ***Blood Panel PCR results on this specimen are available    approximately 3 hours after the Gram stain result.***    Gram stain, PCR, and/or culture results may not always    correspond due to difference in methodologies.    ************************************************************    This PCR assay was performed by multiplex PCR. This    Assay tests for 66 bacterial and resistance gene targets.    Please refer to the [facility] test directory    at [email or web address] for details.  Organism: Blood Culture PCR  Staphylococcus aureus (04-28-21 @ 16:38)  Organism: Staphylococcus aureus (04-28-21 @ 16:38)      -  Ampicillin/Sulbactam: S <=8/4      -  Cefazolin: S <=4      -  Clindamycin: R 0.5 This isolate is presumed to be clindamycin resistant based on detection of inducible resistance. Clindamycin may still be effective in some patients.      -  Erythromycin: R >4      -  Gentamicin: S <=1 Should not be used as monotherapy      -  Oxacillin: S <=0.25      -  Penicillin: R 4      -  RIF- Rifampin: S <=1 Should not be used as monotherapy      -  Tetra/Doxy: S <=1      -  Trimethoprim/Sulfamethoxazole: S <=0.5/9.5      -  Vancomycin: S 2      Method Type: YOLIE  Organism: Blood Culture PCR (04-28-21 @ 16:38)      -  Staphylococcus aureus: Detec Any isolate of Staphylococcus aureus from a blood culture is NOT considered a contaminant.      Method Type: PCR    Culture - Blood (collected 04-26-21 @ 00:31)  Source: .Blood Blood  Gram Stain (04-27-21 @ 12:53):    Growth in aerobic bottle: Gram Positive Cocci in Clusters    Growth in anaerobic bottle: Gram Positive Cocci in Clusters  Final Report (04-28-21 @ 16:39):    Growth in aerobic and anaerobic bottles: Staphylococcus aureus    See previous culture [id number]            INFECTIOUS DISEASES TESTING  COVID-19 PCR: NotDetec (05-02-21 @ 08:52)  COVID-19 PCR: Detected (04-29-21 @ 14:33)  COVID-19 PCR: NotDetec (04-26-21 @ 06:00)  COVID-19 PCR: NotDetec (03-12-21 @ 11:00)  COVID-19 PCR: Detected (02-05-21 @ 13:47)  COVID-19 PCR: NotDetec (10-13-20 @ 09:08)  COVID-19 PCR: NotDetec (10-03-20 @ 19:54)  COVID-19 PCR: NotDetec (09-01-20 @ 20:40)  COVID-19 PCR: NotDetec (07-30-20 @ 07:43)  COVID-19 PCR: NotDetec (07-16-20 @ 19:46)      INFLAMMATORY MARKERS  Sedimentation Rate, Erythrocyte: 106 mm/Hr (05-15-21 @ 08:56)  C-Reactive Protein, Serum: 81 mg/L (05-15-21 @ 08:56)  Sedimentation Rate, Erythrocyte: 106 mm/Hr (05-11-21 @ 07:50)  C-Reactive Protein, Serum: 108 mg/L (05-11-21 @ 07:50)      RADIOLOGY & ADDITIONAL TESTS:  I have personally reviewed the last available Chest xray  CXR      CT      CARDIOLOGY TESTING      MEDICATIONS  ascorbic acid 500 Oral daily  aspirin enteric coated 81 Oral daily  atorvastatin 80 Oral at bedtime  chlorhexidine 4% Liquid 1 Topical <User Schedule>  dextrose 40% Gel 15 Oral once  dextrose 5%. 1000 IV Continuous <Continuous>  dextrose 5%. 1000 IV Continuous <Continuous>  dextrose 50% Injectable 25 IV Push once  dextrose 50% Injectable 25 IV Push once  dextrose 50% Injectable 12.5 IV Push once  digoxin     Tablet 125 Oral daily  DULoxetine 90 Oral daily  enoxaparin Injectable 40 SubCutaneous daily  gabapentin 100 Oral every 8 hours  glucagon  Injectable 1 IntraMuscular once  insulin glargine Injectable (LANTUS) 25 SubCutaneous at bedtime  insulin lispro (ADMELOG) corrective regimen sliding scale  SubCutaneous three times a day before meals  metoprolol succinate ER 25 Oral daily  morphine ER Tablet 15 Oral two times a day  multivitamin/minerals 1 Oral daily  nafcillin  IVPB 2 IV Intermittent every 4 hours  nafcillin  IVPB     nystatin Cream 1 Topical two times a day  pantoprazole    Tablet 40 Oral before breakfast  polyethylene glycol 3350 17 Oral two times a day  prasugrel 10 Oral daily  senna 2 Oral at bedtime  spironolactone 25 Oral daily      WEIGHT  Weight (kg): 81 (05-05-21 @ 13:12)      ANTIBIOTICS:  nafcillin  IVPB 2 Gram(s) IV Intermittent every 4 hours  nafcillin  IVPB          All available historical records have been reviewed

## 2021-05-17 NOTE — PROGRESS NOTE ADULT - ASSESSMENT
A/P:-  Pt is seen and evaluated by bedside.     1. Septic arthritis of Left knee with severe synovitis, s/p washout with recurrent effusion, s/p arthrocentesis 05/11 --sample compromised    2. 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 MSSA bacteremia   4. 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 Chronic iron deficiency anemia with component of ACD  6. DMII   7. Chronic HFrEF s/p AICD   8. CAD    - XR bilateral knees (04/26) large joint effusion in left knee  - s/p arthroscopic drainage of left knee on 04/26 and OR washout 4/26 - 20mL purulent fluid in left knee -  - Synovial Culture (04/26) MSSA  s/p  5/11 Arthrocentesis yielded 5 ml bloody fluid-Specimen compromised though   - Blood Culture (04/26) x2, 04/27 NGTD, 04/28 x2 SJ, 04/30-05/01 NGTD, 05/02 and 05/03 received  - JOHN PAUL  - no endocarditis noted   - currently on nefcillin 2 q4 -->plan to discharge on cefazolin 2 q8 as per ID for total of 6 weeks   - needs repeat MRI of knee for posterior collection   - continue with pain control   - History of acute cholecystitis s/p cholecystostomy tube placement in February 2020 s/p removal in May 2020, with fistula formation and persistent bilious drainage from the prior tract site as well as a RUQ abdominal abscess at the site s/p drainage  - Previous Intraabdominal 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 Decreased area of right upper quadrant subcutaneous stranding at site of prior percutaneous cholecystostomy, without evidence of abscess.  - HIDA scan 05/02: cannot visualize GB  - as per advance GI Dr. Dixon outpatient ERCP and cholecystectomy to divert bile and heal fistula, recommended vac wound  - no further intervention as per IR and surgery   - hb level stable at this time - no active bleeding noted   - Last a1c 9.1 04/29  - off insulin pump, as he has lost it   - Endocrinology reconsulted, Basal bolus insulin ordered However dropped his BS 5/12 after receiving short acting insulin, now refusing even lower dose of short acting insulin   titrate lantus to 25 from 30 U at night     - EP on board: s/p interrogation of AICD 04/28 (normally functioning)  - Cardiology Dr Nelson is on board  - Continue Lasix PO 40mg PRN and aldactone 25mg QD   - Continue Digoxin 125mg QD  - Continue Aspirin 81mg/ Prasugrel 10mg, Rosuvastatin 40mg and toprol     Plan for repeat aspiration per ortho noted   DC  home?  pending Ortho clearance

## 2021-05-17 NOTE — PROGRESS NOTE ADULT - ASSESSMENT
This isASSESSMENT  65 year old male with PMH of CAD s/p MI, PCI/CABG, CHFrEF (15-20%), has ICD, HTN, celiac disease, DM, neuropathy, chronic b/l LE ulcers presents, severe chronic pain,  hx infected R TKR with MRSA (was eventually cemented so has limited ROM R knee), and history of cholecystostomy tube placement in February 2020 for acute cholecystitis s/p removal in May 2020 with multiple presentations including persistent bilious drainage from the prior tract site as well as a RUQ abdominal abscess at the site s/p drainage who presents with left knee pain    IMPRESSION  #Septic Arthritis of left knee with MSSA bacteremia  - s/p arthrocentesis of left knee - consistent with baterial septic infection   - s/p OR washout 4/26 -- purulent fluid in left knee with significant tricompartmental arthritis   - Blood Cx 4/26 MSSA  - OR Cx 4/26 MSSA  - Blood Cx 4/27 NG, 4/28 growing Staph   - Blood Cx 4/30-5/4 NG  - JOHN PAUL 5/5: no evidence of valvular or lead vegation; noted mild, non-mobile atheroma seen in the thoracic aorta.   - MR Knee w/wo IV Cont, Left (05.06.21 @ 19:05): Septic arthritis of the knee with osteomyelitis in the distal femur and proximal tibia. Associated large joint effusion with extensive synovitis. Noadditional collection identified.  - s/p knee tap 5/9 -- 15 cc of blood fluid; WBC 00894 (95% PMN)  - s/p knee tap 5/11 WBC 98095 (97% PMN)    #Biliary Drainage from previous perc sudhir site  - Previous Intraabdominal 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 Decreased area of right upper quadrant subcutaneous stranding at site of prior percutaneous cholecystostomy, without evidence of abscess.  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    #CHF s/p ICD  #DM   #Abx allergy: carvedilol (Other)  enalapril (Hives)  Entresto (Other)    Creatinine, Serum: 1.0 mg/dL (04.25.21 @ 22:15)  Weight (kg): 81 (26 Apr 2021 17:23)    RECOMMENDATIONS  - nafcillin 2g q 4 hours  - still some effusion -- serial arthrocentesis per ortho   - pain control per primary  - given large joint effusion seen on MRI 5/6 -- would plan 6 weeks of antibiotics from time of last drainage (5/11)  - will eventually need PICC to finish course with cefazoling 2g q 8 hours    Please call or message on [employer] Teams if with any questions.  Spectra [phone]

## 2021-05-17 NOTE — PROGRESS NOTE ADULT - ASSESSMENT
Needs new set of labs  Recommend pain management consult, possible muscle relaxant for spasms  Will follow

## 2021-05-17 NOTE — PROGRESS NOTE ADULT - SUBJECTIVE AND OBJECTIVE BOX
Patient is a 65y old  Male who presents with a chief complaint of Septic arthritis (05 May 2021 07:23)      S   L knee pain still there and not bending his knee much   Asking for muscle relaxants     No more hypoglycemia       Vital Signs Last 24 Hrs  T(C): 36.8 (05 May 2021 12:00), Max: 36.8 (04 May 2021 21:35)  T(F): 98.3 (05 May 2021 12:00), Max: 98.3 (04 May 2021 21:35)  HR: 77 (05 May 2021 12:00) (77 - 79)  BP: 101/56 (05 May 2021 12:00) (101/56 - 118/62)  BP(mean): --  RR: 16 (05 May 2021 12:00) (16 - 18)  SpO2: --    PHYSICAL EXAM:   NAD; Normocephalic;   LUNGS - no wheezing  HEART: S1 S2+   ABDOMEN: Soft, Nontender, non distended  EXTREMITIES: no cyanosis; no edema L knee dressed   NERVOUS SYSTEM:  Awake and alert; no focal neuro deficits appreciated      LABS:                        8.0    10.62 )-----------( 374      ( 05 May 2021 05:12 )             27.3     05-05    135  |  99  |  17  ----------------------------<  346<H>  4.3   |  24  |  0.9    Ca    8.2<L>      05 May 2021 05:12  Mg     2.0     05-04    TPro  6.3  /  Alb  2.7<L>  /  TBili  0.5  /  DBili  x   /  AST  12  /  ALT  14  /  AlkPhos  95  05-05        CAPILLARY BLOOD GLUCOSE

## 2021-05-18 LAB
CRP SERPL-MCNC: 59 MG/L — HIGH
ERYTHROCYTE [SEDIMENTATION RATE] IN BLOOD: 127 MM/HR — HIGH (ref 0–10)
GLUCOSE BLDC GLUCOMTR-MCNC: 104 MG/DL — HIGH (ref 70–99)
GLUCOSE BLDC GLUCOMTR-MCNC: 126 MG/DL — HIGH (ref 70–99)
GLUCOSE BLDC GLUCOMTR-MCNC: 153 MG/DL — HIGH (ref 70–99)
GLUCOSE BLDC GLUCOMTR-MCNC: 96 MG/DL — SIGNIFICANT CHANGE UP (ref 70–99)
HCT VFR BLD CALC: 25 % — LOW (ref 42–52)
HGB BLD-MCNC: 7.3 G/DL — LOW (ref 14–18)
MCHC RBC-ENTMCNC: 20.9 PG — LOW (ref 27–31)
MCHC RBC-ENTMCNC: 29.2 G/DL — LOW (ref 32–37)
MCV RBC AUTO: 71.4 FL — LOW (ref 80–94)
NRBC # BLD: 0 /100 WBCS — SIGNIFICANT CHANGE UP (ref 0–0)
PLATELET # BLD AUTO: 470 K/UL — HIGH (ref 130–400)
RBC # BLD: 3.5 M/UL — LOW (ref 4.7–6.1)
RBC # FLD: 22.2 % — HIGH (ref 11.5–14.5)
WBC # BLD: 7.53 K/UL — SIGNIFICANT CHANGE UP (ref 4.8–10.8)
WBC # FLD AUTO: 7.53 K/UL — SIGNIFICANT CHANGE UP (ref 4.8–10.8)

## 2021-05-18 PROCEDURE — 99232 SBSQ HOSP IP/OBS MODERATE 35: CPT

## 2021-05-18 RX ADMIN — PANTOPRAZOLE SODIUM 40 MILLIGRAM(S): 20 TABLET, DELAYED RELEASE ORAL at 06:02

## 2021-05-18 RX ADMIN — ATORVASTATIN CALCIUM 80 MILLIGRAM(S): 80 TABLET, FILM COATED ORAL at 22:33

## 2021-05-18 RX ADMIN — Medication 125 MICROGRAM(S): at 05:58

## 2021-05-18 RX ADMIN — PRASUGREL 10 MILLIGRAM(S): 5 TABLET, FILM COATED ORAL at 11:54

## 2021-05-18 RX ADMIN — MORPHINE SULFATE 15 MILLIGRAM(S): 50 CAPSULE, EXTENDED RELEASE ORAL at 06:01

## 2021-05-18 RX ADMIN — NYSTATIN CREAM 1 APPLICATION(S): 100000 CREAM TOPICAL at 06:01

## 2021-05-18 RX ADMIN — SPIRONOLACTONE 25 MILLIGRAM(S): 25 TABLET, FILM COATED ORAL at 05:58

## 2021-05-18 RX ADMIN — NAFCILLIN 200 GRAM(S): 10 INJECTION, POWDER, FOR SOLUTION INTRAVENOUS at 17:19

## 2021-05-18 RX ADMIN — MORPHINE SULFATE 15 MILLIGRAM(S): 50 CAPSULE, EXTENDED RELEASE ORAL at 17:39

## 2021-05-18 RX ADMIN — Medication 81 MILLIGRAM(S): at 11:54

## 2021-05-18 RX ADMIN — NAFCILLIN 200 GRAM(S): 10 INJECTION, POWDER, FOR SOLUTION INTRAVENOUS at 01:29

## 2021-05-18 RX ADMIN — ENOXAPARIN SODIUM 40 MILLIGRAM(S): 100 INJECTION SUBCUTANEOUS at 11:56

## 2021-05-18 RX ADMIN — GABAPENTIN 100 MILLIGRAM(S): 400 CAPSULE ORAL at 22:33

## 2021-05-18 RX ADMIN — MORPHINE SULFATE 4 MILLIGRAM(S): 50 CAPSULE, EXTENDED RELEASE ORAL at 00:43

## 2021-05-18 RX ADMIN — Medication 1 TABLET(S): at 11:54

## 2021-05-18 RX ADMIN — NAFCILLIN 200 GRAM(S): 10 INJECTION, POWDER, FOR SOLUTION INTRAVENOUS at 11:53

## 2021-05-18 RX ADMIN — Medication 500 MILLIGRAM(S): at 11:56

## 2021-05-18 RX ADMIN — Medication 1: at 08:18

## 2021-05-18 RX ADMIN — DULOXETINE HYDROCHLORIDE 90 MILLIGRAM(S): 30 CAPSULE, DELAYED RELEASE ORAL at 11:55

## 2021-05-18 RX ADMIN — GABAPENTIN 100 MILLIGRAM(S): 400 CAPSULE ORAL at 05:58

## 2021-05-18 RX ADMIN — SENNA PLUS 2 TABLET(S): 8.6 TABLET ORAL at 22:33

## 2021-05-18 RX ADMIN — MORPHINE SULFATE 4 MILLIGRAM(S): 50 CAPSULE, EXTENDED RELEASE ORAL at 22:32

## 2021-05-18 RX ADMIN — NAFCILLIN 200 GRAM(S): 10 INJECTION, POWDER, FOR SOLUTION INTRAVENOUS at 13:29

## 2021-05-18 RX ADMIN — MORPHINE SULFATE 4 MILLIGRAM(S): 50 CAPSULE, EXTENDED RELEASE ORAL at 23:00

## 2021-05-18 RX ADMIN — Medication 25 MILLIGRAM(S): at 05:58

## 2021-05-18 RX ADMIN — GABAPENTIN 100 MILLIGRAM(S): 400 CAPSULE ORAL at 13:29

## 2021-05-18 RX ADMIN — CHLORHEXIDINE GLUCONATE 1 APPLICATION(S): 213 SOLUTION TOPICAL at 05:58

## 2021-05-18 RX ADMIN — NAFCILLIN 200 GRAM(S): 10 INJECTION, POWDER, FOR SOLUTION INTRAVENOUS at 05:58

## 2021-05-18 RX ADMIN — INSULIN GLARGINE 25 UNIT(S): 100 INJECTION, SOLUTION SUBCUTANEOUS at 22:34

## 2021-05-18 RX ADMIN — NAFCILLIN 200 GRAM(S): 10 INJECTION, POWDER, FOR SOLUTION INTRAVENOUS at 22:32

## 2021-05-18 RX ADMIN — MORPHINE SULFATE 15 MILLIGRAM(S): 50 CAPSULE, EXTENDED RELEASE ORAL at 17:18

## 2021-05-18 RX ADMIN — Medication 10 MILLIGRAM(S): at 22:32

## 2021-05-18 NOTE — PROGRESS NOTE ADULT - ASSESSMENT
A/P:-  Pt is seen and evaluated by bedside.     1. Septic arthritis of Left knee with severe synovitis, s/p washout with recurrent effusion, s/p arthrocentesis 05/11 --sample compromised    2. 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 MSSA bacteremia   4. 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 Chronic iron deficiency anemia with component of ACD  6. DMII   7. Chronic HFrEF s/p AICD   8. CAD    - XR bilateral knees (04/26) large joint effusion in left knee  - s/p arthroscopic drainage of left knee on 04/26 and OR washout 4/26 - 20mL purulent fluid in left knee -  - Synovial Culture (04/26) MSSA  s/p  5/11 Arthrocentesis yielded 5 ml bloody fluid-Specimen compromised though   - Blood Culture (04/26) x2, 04/27 NGTD, 04/28 x2 SJ, 04/30-05/01 NGTD, 05/02 and 05/03 received  - JOHN PAUL  - no endocarditis noted   - currently on nefcillin 2 q4 -->plan to discharge on cefazolin 2 q8 as per ID for total of 6 weeks   - needs repeat MRI of knee for posterior collection   - continue with pain control   - History of acute cholecystitis s/p cholecystostomy tube placement in February 2020 s/p removal in May 2020, with fistula formation and persistent bilious drainage from the prior tract site as well as a RUQ abdominal abscess at the site s/p drainage  - Previous Intraabdominal 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 Decreased area of right upper quadrant subcutaneous stranding at site of prior percutaneous cholecystostomy, without evidence of abscess.  - HIDA scan 05/02: cannot visualize GB  - as per advance GI Dr. Dixon outpatient ERCP and cholecystectomy to divert bile and heal fistula, recommended vac wound  - no further intervention as per IR and surgery   - hb level stable at this time - no active bleeding noted   - Last a1c 9.1 04/29  - off insulin pump, as he has lost it   - Endocrinology reconsulted, Basal bolus insulin ordered However dropped his BS 5/12 after receiving short acting insulin, now refusing even lower dose of short acting insulin   titrate lantus to 25 from 30 U at night     - EP on board: s/p interrogation of AICD 04/28 (normally functioning)  - Cardiology Dr Nelson is on board  - Continue Lasix PO 40mg PRN and aldactone 25mg QD   - Continue Digoxin 125mg QD  - Continue Aspirin 81mg/ Prasugrel 10mg, Rosuvastatin 40mg and toprol     Plan for repeat aspiration per ortho noted   DC  home?  pending Ortho clearance              A/P:-  Pt is seen and evaluated by bedside.     1. Septic arthritis of Left knee with severe synovitis, s/p washout with recurrent effusion, s/p arthrocentesis 05/11 --sample compromised    2. 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 MSSA bacteremia   4. 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 Chronic iron deficiency anemia with component of ACD  6. DMII   7. Chronic HFrEF s/p AICD   8. CAD    - XR bilateral knees (04/26) large joint effusion in left knee  - s/p arthroscopic drainage of left knee on 04/26 and OR washout 4/26 - 20mL purulent fluid in left knee -  - Synovial Culture (04/26) MSSA  s/p  5/11 Arthrocentesis yielded 5 ml bloody fluid-Specimen compromised though   - Blood Culture (04/26) x2, 04/27 NGTD, 04/28 x2 SJ, 04/30-05/01 NGTD, 05/02 and 05/03 received  - JOHN PAUL  - no endocarditis noted   - currently on nefcillin 2 q4 -->plan to discharge on cefazolin 2 q8 as per ID for total of 6 weeks   - needs repeat MRI of knee for posterior collection   - continue with pain control   - History of acute cholecystitis s/p cholecystostomy tube placement in February 2020 s/p removal in May 2020, with fistula formation and persistent bilious drainage from the prior tract site as well as a RUQ abdominal abscess at the site s/p drainage  - Previous Intraabdominal 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 Decreased area of right upper quadrant subcutaneous stranding at site of prior percutaneous cholecystostomy, without evidence of abscess.  - HIDA scan 05/02: cannot visualize GB  - as per advance GI Dr. Dixon outpatient ERCP and cholecystectomy to divert bile and heal fistula, recommended vac wound  - no further intervention as per IR and surgery   - hb level stable at this time - no active bleeding noted   - Last a1c 9.1 04/29  - off insulin pump, as he has lost it   - Endocrinology reconsulted, Basal bolus insulin ordered However dropped his BS 5/12 after receiving short acting insulin, now refusing even lower dose of short acting insulin   titrate lantus to 25 from 30 U at night     - EP on board: s/p interrogation of AICD 04/28 (normally functioning)  - Cardiology Dr Nelson is on board  - Continue Lasix PO 40mg PRN and aldactone 25mg QD   - Continue Digoxin 125mg QD  - Continue Aspirin 81mg/ Prasugrel 10mg, Rosuvastatin 40mg and toprol   Pain mgmt consulted           Plan for repeat aspiration per ortho noted   DC  home?  pending Ortho clearance

## 2021-05-18 NOTE — PROGRESS NOTE ADULT - SUBJECTIVE AND OBJECTIVE BOX
SUBJ: Patient seen and examined. Still complains of knee pain.       MEDICATIONS  (STANDING):  ascorbic acid 500 milliGRAM(s) Oral daily  aspirin enteric coated 81 milliGRAM(s) Oral daily  atorvastatin 80 milliGRAM(s) Oral at bedtime  chlorhexidine 4% Liquid 1 Application(s) Topical <User Schedule>  dextrose 40% Gel 15 Gram(s) Oral once  dextrose 5%. 1000 milliLiter(s) (50 mL/Hr) IV Continuous <Continuous>  dextrose 5%. 1000 milliLiter(s) (100 mL/Hr) IV Continuous <Continuous>  dextrose 50% Injectable 25 Gram(s) IV Push once  dextrose 50% Injectable 25 Gram(s) IV Push once  dextrose 50% Injectable 12.5 Gram(s) IV Push once  digoxin     Tablet 125 MICROGram(s) Oral daily  DULoxetine 90 milliGRAM(s) Oral daily  enoxaparin Injectable 40 milliGRAM(s) SubCutaneous daily  gabapentin 100 milliGRAM(s) Oral every 8 hours  glucagon  Injectable 1 milliGRAM(s) IntraMuscular once  insulin glargine Injectable (LANTUS) 25 Unit(s) SubCutaneous at bedtime  insulin lispro (ADMELOG) corrective regimen sliding scale   SubCutaneous three times a day before meals  metoprolol succinate ER 25 milliGRAM(s) Oral daily  multivitamin/minerals 1 Tablet(s) Oral daily  nafcillin  IVPB      nafcillin  IVPB 2 Gram(s) IV Intermittent every 4 hours  nystatin Cream 1 Application(s) Topical two times a day  pantoprazole    Tablet 40 milliGRAM(s) Oral before breakfast  polyethylene glycol 3350 17 Gram(s) Oral two times a day  prasugrel 10 milliGRAM(s) Oral daily  senna 2 Tablet(s) Oral at bedtime  spironolactone 25 milliGRAM(s) Oral daily    MEDICATIONS  (PRN):  acetaminophen   Tablet .. 650 milliGRAM(s) Oral every 6 hours PRN Temp greater or equal to 38C (100.4F), Mild Pain (1 - 3)  furosemide    Tablet 40 milliGRAM(s) Oral daily PRN fluid overload  melatonin 10 milliGRAM(s) Oral at bedtime PRN Insomnia  morphine  - Injectable 4 milliGRAM(s) IV Push every 4 hours PRN Severe Pain (7 - 10)            Vital Signs Last 24 Hrs  T(C): 36.3 (18 May 2021 13:08), Max: 37 (18 May 2021 05:03)  T(F): 97.3 (18 May 2021 13:08), Max: 98.6 (18 May 2021 05:03)  HR: 70 (18 May 2021 13:08) (70 - 82)  BP: 107/65 (18 May 2021 13:08) (107/65 - 126/65)  BP(mean): --  RR: 18 (18 May 2021 13:08) (18 - 18)  SpO2: --     REVIEW OF SYSTEMS:  CONSTITUTIONAL: No fever  NECK: No pain or stiffness  CARDIOVASCULAR: patient denies chest pain, shortness of breath or palpitations .  Repiratory: No cough or wheezing.  NEUROLOGICAL: No focal deficits to report.  GI: no BRBPR, no N,V,diarrhea.    PSYCHIATRY: normal mood and affect  HEENT: no nasal discharge, no ecchymosis        PHYSICAL EXAM:  GENERAL: NAD  HEAD:  Atraumatic, Normocephalic  NECK: Supple, No JVD  NERVOUS SYSTEM:  Alert & Oriented X3, Good concentration  CHEST/LUNG: Clear to anterior auscultation bilaterally  HEART: Regular rate and rhythm  ABDOMEN: Soft, Nontender, Nondistended;   EXTREMITIES:  No  edema          LABS:                        7.3    7.53  )-----------( 470      ( 18 May 2021 06:36 )             25.0                   I&O's Summary    17 May 2021 07:01  -  18 May 2021 07:00  --------------------------------------------------------  IN: 905 mL / OUT: 1400 mL / NET: -495 mL    18 May 2021 07:01  -  18 May 2021 20:19  --------------------------------------------------------  IN: 0 mL / OUT: 350 mL / NET: -350 mL      BNP  RADIOLOGY & ADDITIONAL STUDIES:    IMPRESSION AND PLAN:    CAD and ischemic cardiomyopathy   Septic Knee. MRI showed left knee effusion   MSSA bacteremia. JOHN PAUL showed no evidence of endocarditis    Frail/ not working with PT due to pain  Anemia   - Management as per primary team  - Euvolemic continue to monitor I&O/ Lasix prn  - DVT prophylaxis   - Continue ASA and Prasugrel   - Will follow

## 2021-05-19 LAB
GLUCOSE BLDC GLUCOMTR-MCNC: 152 MG/DL — HIGH (ref 70–99)
GLUCOSE BLDC GLUCOMTR-MCNC: 180 MG/DL — HIGH (ref 70–99)
GLUCOSE BLDC GLUCOMTR-MCNC: 86 MG/DL — SIGNIFICANT CHANGE UP (ref 70–99)
GLUCOSE BLDC GLUCOMTR-MCNC: 99 MG/DL — SIGNIFICANT CHANGE UP (ref 70–99)

## 2021-05-19 PROCEDURE — 99232 SBSQ HOSP IP/OBS MODERATE 35: CPT

## 2021-05-19 RX ORDER — GABAPENTIN 400 MG/1
300 CAPSULE ORAL THREE TIMES A DAY
Refills: 0 | Status: DISCONTINUED | OUTPATIENT
Start: 2021-05-19 | End: 2021-05-27

## 2021-05-19 RX ORDER — BACLOFEN 100 %
5 POWDER (GRAM) MISCELLANEOUS EVERY 8 HOURS
Refills: 0 | Status: DISCONTINUED | OUTPATIENT
Start: 2021-05-19 | End: 2021-05-27

## 2021-05-19 RX ORDER — OXYCODONE HYDROCHLORIDE 5 MG/1
5 TABLET ORAL EVERY 4 HOURS
Refills: 0 | Status: DISCONTINUED | OUTPATIENT
Start: 2021-05-19 | End: 2021-05-22

## 2021-05-19 RX ORDER — OXYCODONE HYDROCHLORIDE 5 MG/1
10 TABLET ORAL EVERY 4 HOURS
Refills: 0 | Status: DISCONTINUED | OUTPATIENT
Start: 2021-05-19 | End: 2021-05-26

## 2021-05-19 RX ADMIN — NYSTATIN CREAM 1 APPLICATION(S): 100000 CREAM TOPICAL at 05:46

## 2021-05-19 RX ADMIN — GABAPENTIN 300 MILLIGRAM(S): 400 CAPSULE ORAL at 21:51

## 2021-05-19 RX ADMIN — Medication 5 MILLIGRAM(S): at 21:48

## 2021-05-19 RX ADMIN — Medication 650 MILLIGRAM(S): at 15:42

## 2021-05-19 RX ADMIN — NAFCILLIN 200 GRAM(S): 10 INJECTION, POWDER, FOR SOLUTION INTRAVENOUS at 16:44

## 2021-05-19 RX ADMIN — INSULIN GLARGINE 25 UNIT(S): 100 INJECTION, SOLUTION SUBCUTANEOUS at 21:49

## 2021-05-19 RX ADMIN — OXYCODONE HYDROCHLORIDE 10 MILLIGRAM(S): 5 TABLET ORAL at 17:46

## 2021-05-19 RX ADMIN — MORPHINE SULFATE 4 MILLIGRAM(S): 50 CAPSULE, EXTENDED RELEASE ORAL at 05:47

## 2021-05-19 RX ADMIN — NAFCILLIN 200 GRAM(S): 10 INJECTION, POWDER, FOR SOLUTION INTRAVENOUS at 21:49

## 2021-05-19 RX ADMIN — PRASUGREL 10 MILLIGRAM(S): 5 TABLET, FILM COATED ORAL at 11:12

## 2021-05-19 RX ADMIN — GABAPENTIN 100 MILLIGRAM(S): 400 CAPSULE ORAL at 13:29

## 2021-05-19 RX ADMIN — Medication 1 TABLET(S): at 11:12

## 2021-05-19 RX ADMIN — DULOXETINE HYDROCHLORIDE 90 MILLIGRAM(S): 30 CAPSULE, DELAYED RELEASE ORAL at 11:12

## 2021-05-19 RX ADMIN — POLYETHYLENE GLYCOL 3350 17 GRAM(S): 17 POWDER, FOR SOLUTION ORAL at 05:46

## 2021-05-19 RX ADMIN — Medication 125 MICROGRAM(S): at 05:46

## 2021-05-19 RX ADMIN — NAFCILLIN 200 GRAM(S): 10 INJECTION, POWDER, FOR SOLUTION INTRAVENOUS at 05:46

## 2021-05-19 RX ADMIN — Medication 25 MILLIGRAM(S): at 05:46

## 2021-05-19 RX ADMIN — NAFCILLIN 200 GRAM(S): 10 INJECTION, POWDER, FOR SOLUTION INTRAVENOUS at 11:13

## 2021-05-19 RX ADMIN — OXYCODONE HYDROCHLORIDE 10 MILLIGRAM(S): 5 TABLET ORAL at 21:47

## 2021-05-19 RX ADMIN — MORPHINE SULFATE 4 MILLIGRAM(S): 50 CAPSULE, EXTENDED RELEASE ORAL at 06:30

## 2021-05-19 RX ADMIN — OXYCODONE HYDROCHLORIDE 10 MILLIGRAM(S): 5 TABLET ORAL at 22:30

## 2021-05-19 RX ADMIN — ENOXAPARIN SODIUM 40 MILLIGRAM(S): 100 INJECTION SUBCUTANEOUS at 11:12

## 2021-05-19 RX ADMIN — MORPHINE SULFATE 4 MILLIGRAM(S): 50 CAPSULE, EXTENDED RELEASE ORAL at 12:02

## 2021-05-19 RX ADMIN — SPIRONOLACTONE 25 MILLIGRAM(S): 25 TABLET, FILM COATED ORAL at 05:46

## 2021-05-19 RX ADMIN — NYSTATIN CREAM 1 APPLICATION(S): 100000 CREAM TOPICAL at 16:44

## 2021-05-19 RX ADMIN — GABAPENTIN 100 MILLIGRAM(S): 400 CAPSULE ORAL at 05:46

## 2021-05-19 RX ADMIN — ATORVASTATIN CALCIUM 80 MILLIGRAM(S): 80 TABLET, FILM COATED ORAL at 21:48

## 2021-05-19 RX ADMIN — Medication 81 MILLIGRAM(S): at 11:12

## 2021-05-19 RX ADMIN — NAFCILLIN 200 GRAM(S): 10 INJECTION, POWDER, FOR SOLUTION INTRAVENOUS at 02:40

## 2021-05-19 RX ADMIN — SENNA PLUS 2 TABLET(S): 8.6 TABLET ORAL at 21:48

## 2021-05-19 RX ADMIN — NAFCILLIN 200 GRAM(S): 10 INJECTION, POWDER, FOR SOLUTION INTRAVENOUS at 13:27

## 2021-05-19 RX ADMIN — Medication 500 MILLIGRAM(S): at 11:12

## 2021-05-19 RX ADMIN — PANTOPRAZOLE SODIUM 40 MILLIGRAM(S): 20 TABLET, DELAYED RELEASE ORAL at 05:46

## 2021-05-19 RX ADMIN — Medication 1: at 16:43

## 2021-05-19 NOTE — CONSULT NOTE ADULT - SUBJECTIVE AND OBJECTIVE BOX
Chief Complaint: septic knee    Pain HPI:          PAST MEDICAL & SURGICAL HISTORY:  Status post percutaneous transluminal coronary angioplasty  2 stents    Atherosclerosis of coronary artery  CAD (coronary artery disease)    Osteoarthritis    HLD (hyperlipidemia)    Blood clot due to device, implant, or graft  was on blood thinners    Diabetes  on insulin pump    HTN (hypertension)    CHF (congestive heart failure)  EF ~ 25%    History of celiac disease    Diverticulitis    STEMI (ST elevation myocardial infarction)    Diabetic neuropathy    Anxiety and depression    Other postprocedural status  Fixation hardware in foot LEFT    Stented coronary artery  10/18 heart attack  INFERIOR WALL MI    S/P CABG x 1  2018    S/P TKR (total knee replacement), right  with infection Mrsa   per pt he was cleared from MRSA infection    Surgery, elective  right knee wound debridement    History of open reduction and internal fixation (ORIF) procedure    H/O shoulder surgery  right    AICD (automatic cardioverter/defibrillator) present    Cholecystostomy care  drain inserted 2020 &amp; removed 4 months ago    History of tonsillectomy    History of hip replacement, total, right        FAMILY HISTORY:  Family history of heart disease (Mother)    Family history of allergies  daughter        SOCIAL HISTORY:  [ ] Denies Smoking, Alcohol, or Drug Use    Allergies    ACE inhibitors (Hives)  carvedilol (Other)  enalapril (Hives)  Entresto (Other)    Intolerances        PAIN MEDICATIONS:  acetaminophen   Tablet .. 650 milliGRAM(s) Oral every 6 hours PRN  DULoxetine 90 milliGRAM(s) Oral daily  gabapentin 100 milliGRAM(s) Oral every 8 hours  melatonin 10 milliGRAM(s) Oral at bedtime PRN  morphine  - Injectable 4 milliGRAM(s) IV Push every 4 hours PRN    Heme:  aspirin enteric coated 81 milliGRAM(s) Oral daily  enoxaparin Injectable 40 milliGRAM(s) SubCutaneous daily  prasugrel 10 milliGRAM(s) Oral daily    Antibiotics:  nafcillin  IVPB 2 Gram(s) IV Intermittent every 4 hours  nafcillin  IVPB        Cardiovascular:  digoxin     Tablet 125 MICROGram(s) Oral daily  furosemide    Tablet 40 milliGRAM(s) Oral daily PRN  metoprolol succinate ER 25 milliGRAM(s) Oral daily  spironolactone 25 milliGRAM(s) Oral daily    GI:  pantoprazole    Tablet 40 milliGRAM(s) Oral before breakfast  polyethylene glycol 3350 17 Gram(s) Oral two times a day  senna 2 Tablet(s) Oral at bedtime    Endocrine:  atorvastatin 80 milliGRAM(s) Oral at bedtime  dextrose 40% Gel 15 Gram(s) Oral once  dextrose 50% Injectable 25 Gram(s) IV Push once  dextrose 50% Injectable 12.5 Gram(s) IV Push once  dextrose 50% Injectable 25 Gram(s) IV Push once  glucagon  Injectable 1 milliGRAM(s) IntraMuscular once  insulin glargine Injectable (LANTUS) 25 Unit(s) SubCutaneous at bedtime  insulin lispro (ADMELOG) corrective regimen sliding scale   SubCutaneous three times a day before meals    All Other Medications:  ascorbic acid 500 milliGRAM(s) Oral daily  chlorhexidine 4% Liquid 1 Application(s) Topical <User Schedule>  dextrose 5%. 1000 milliLiter(s) IV Continuous <Continuous>  dextrose 5%. 1000 milliLiter(s) IV Continuous <Continuous>  multivitamin/minerals 1 Tablet(s) Oral daily  nystatin Cream 1 Application(s) Topical two times a day      REVIEW OF SYSTEMS:    CONSTITUTIONAL: No fever, weight loss, or fatigue    ENMT:  No difficulty hearing, tinnitus, vertigo; No sinus or throat pain  RESPIRATORY: No cough, wheezing, chills or hemoptysis; No shortness of breath  CARDIOVASCULAR: No chest pain, palpitations, dizziness, or leg swelling  GASTROINTESTINAL: No abdominal or epigastric pain. No nausea, vomiting, or hematemesis; No diarrhea or constipation. No melena or hematochezia.  GENITOURINARY: No dysuria, frequency, hematuria, or incontinence  NEUROLOGICAL: No headaches, memory loss, loss of strength, numbness, or tremors  MUSCULOSKELETAL: As per HPI  PSYCHIATRIC: No depression, anxiety, mood swings, or difficulty sleeping  HEME/LYMPH: AC  ALLERY AND IMMUNOLOGIC: No hives or eczema      Vital Signs Last 24 Hrs  T(C): 36.4 (19 May 2021 05:24), Max: 36.6 (18 May 2021 20:56)  T(F): 97.6 (19 May 2021 05:24), Max: 97.8 (18 May 2021 20:56)  HR: 80 (19 May 2021 05:24) (70 - 80)  BP: 128/62 (19 May 2021 05:24) (107/65 - 128/62)  BP(mean): --  RR: 18 (19 May 2021 05:24) (18 - 18)  SpO2: --    PAIN SCORE:         SCALE USED: (1-10 VNRS)             PHYSICAL EXAM:    GENERAL: NAD, well-groomed, well-developed  HEAD:  Atraumatic, Normocephalic  EYES: EOMI, PERRLA, conjunctiva and sclera clear  ENMT: No tonsillar erythema, exudates, or enlargement; Moist mucous membranes, Good dentition, No lesions  NECK: Supple, No JVD, Normal thyroid  NERVOUS SYSTEM:  Alert & Oriented X3, Good concentration; Motor Strength 5/5 B/L upper and lower extremities; DTRs 2+ intact and symmetric  CHEST/LUNG: Clear to percussion bilaterally; No rales, rhonchi, wheezing, or rubs  HEART: Regular rate and rhythm; No murmurs, rubs, or gallops  ABDOMEN: Soft, Nontender, Nondistended; Bowel sounds present  EXTREMITIES:  2+ Peripheral Pulses, No clubbing, cyanosis, or edema  LYMPH: No lymphadenopathy noted  SKIN: No rashes or lesions        LABS:                          7.3    7.53  )-----------( 470      ( 18 May 2021 06:36 )             25.0                 RADIOLOGY:    Drug Screen:            [ ]  [place]  Reviewed and Copied to Chart Chief Complaint: septic knee    Pain HPI:          PAST MEDICAL & SURGICAL HISTORY:  Status post percutaneous transluminal coronary angioplasty  2 stents    Atherosclerosis of coronary artery  CAD (coronary artery disease)    Osteoarthritis    HLD (hyperlipidemia)    Blood clot due to device, implant, or graft  was on blood thinners    Diabetes  on insulin pump    HTN (hypertension)    CHF (congestive heart failure)  EF ~ 25%    History of celiac disease    Diverticulitis    STEMI (ST elevation myocardial infarction)    Diabetic neuropathy    Anxiety and depression    Other postprocedural status  Fixation hardware in foot LEFT    Stented coronary artery  10/18 heart attack  INFERIOR WALL MI    S/P CABG x 1  2018    S/P TKR (total knee replacement), right  with infection Mrsa   per pt he was cleared from MRSA infection    Surgery, elective  right knee wound debridement    History of open reduction and internal fixation (ORIF) procedure    H/O shoulder surgery  right    AICD (automatic cardioverter/defibrillator) present    Cholecystostomy care  drain inserted 2020 &amp; removed 4 months ago    History of tonsillectomy    History of hip replacement, total, right        FAMILY HISTORY:  Family history of heart disease (Mother)    Family history of allergies  daughter        SOCIAL HISTORY:  [ ] Denies Smoking, Alcohol, or Drug Use    Allergies    ACE inhibitors (Hives)  carvedilol (Other)  enalapril (Hives)  Entresto (Other)    Intolerances        PAIN MEDICATIONS:  acetaminophen   Tablet .. 650 milliGRAM(s) Oral every 6 hours PRN  DULoxetine 90 milliGRAM(s) Oral daily  gabapentin 100 milliGRAM(s) Oral every 8 hours  melatonin 10 milliGRAM(s) Oral at bedtime PRN  morphine  - Injectable 4 milliGRAM(s) IV Push every 4 hours PRN    Heme:  aspirin enteric coated 81 milliGRAM(s) Oral daily  enoxaparin Injectable 40 milliGRAM(s) SubCutaneous daily  prasugrel 10 milliGRAM(s) Oral daily    Antibiotics:  nafcillin  IVPB 2 Gram(s) IV Intermittent every 4 hours  nafcillin  IVPB        Cardiovascular:  digoxin     Tablet 125 MICROGram(s) Oral daily  furosemide    Tablet 40 milliGRAM(s) Oral daily PRN  metoprolol succinate ER 25 milliGRAM(s) Oral daily  spironolactone 25 milliGRAM(s) Oral daily    GI:  pantoprazole    Tablet 40 milliGRAM(s) Oral before breakfast  polyethylene glycol 3350 17 Gram(s) Oral two times a day  senna 2 Tablet(s) Oral at bedtime    Endocrine:  atorvastatin 80 milliGRAM(s) Oral at bedtime  dextrose 40% Gel 15 Gram(s) Oral once  dextrose 50% Injectable 25 Gram(s) IV Push once  dextrose 50% Injectable 12.5 Gram(s) IV Push once  dextrose 50% Injectable 25 Gram(s) IV Push once  glucagon  Injectable 1 milliGRAM(s) IntraMuscular once  insulin glargine Injectable (LANTUS) 25 Unit(s) SubCutaneous at bedtime  insulin lispro (ADMELOG) corrective regimen sliding scale   SubCutaneous three times a day before meals    All Other Medications:  ascorbic acid 500 milliGRAM(s) Oral daily  chlorhexidine 4% Liquid 1 Application(s) Topical <User Schedule>  dextrose 5%. 1000 milliLiter(s) IV Continuous <Continuous>  dextrose 5%. 1000 milliLiter(s) IV Continuous <Continuous>  multivitamin/minerals 1 Tablet(s) Oral daily  nystatin Cream 1 Application(s) Topical two times a day      REVIEW OF SYSTEMS:    CONSTITUTIONAL: No fever, weight loss, or fatigue  ENMT:  No difficulty hearing, tinnitus, vertigo; No sinus or throat pain  RESPIRATORY: No cough, wheezing, chills or hemoptysis; No shortness of breath  CARDIOVASCULAR: No chest pain, palpitations, dizziness, or leg swelling  GASTROINTESTINAL: No abdominal or epigastric pain. No nausea, vomiting, or hematemesis; No diarrhea or constipation. No melena or hematochezia.  GENITOURINARY: No dysuria, frequency, hematuria, or incontinence  NEUROLOGICAL: No headaches, memory loss, loss of strength, numbness, or tremors  MUSCULOSKELETAL: As per HPI  PSYCHIATRIC: No depression, anxiety, mood swings, or difficulty sleeping  HEME/LYMPH: AC  ALLERY AND IMMUNOLOGIC: No hives or eczema      Vital Signs Last 24 Hrs  T(C): 36.4 (19 May 2021 05:24), Max: 36.6 (18 May 2021 20:56)  T(F): 97.6 (19 May 2021 05:24), Max: 97.8 (18 May 2021 20:56)  HR: 80 (19 May 2021 05:24) (70 - 80)  BP: 128/62 (19 May 2021 05:24) (107/65 - 128/62)  BP(mean): --  RR: 18 (19 May 2021 05:24) (18 - 18)  SpO2: --    PAIN SCORE:         SCALE USED: (1-10 VNRS)             PHYSICAL EXAM:    GENERAL: NAD, well-groomed, well-developed  HEAD:  Atraumatic, Normocephalic  EYES: EOMI, PERRLA, conjunctiva and sclera clear  NERVOUS SYSTEM:  Alert & Oriented X3, Good concentration; Moving all 4 extremities  CHEST/LUNG: Non labored breathing  HEART: Regular rate and rhythm; No murmurs, rubs, or gallops  ABDOMEN: Soft, Nontender, Nondistended; Bowel sounds present  EXTREMITIES:  R knee healed incisions, L knee swollen, RLE w/ abrasions   LYMPH: No lymphadenopathy noted  SKIN: No rashes or lesions        LABS:                          7.3    7.53  )-----------( 470      ( 18 May 2021 06:36 )             25.0                 RADIOLOGY:    Drug Screen:            [ ]  [place]  Reviewed and Copied to Chart  his report was requested by: Markie Buchanan | Reference #: [id number]    You have not added a JANETH number. Keeping your JANETH number(s) up to date on the My JANETH # page will enable the separation of your prescriptions from others in the search results.    Others' Prescriptions  Patient Name: Juanito Whipple Date: 1955  Address: [address] [place] [id number]: Male  Rx Written	Rx Dispensed	Drug	Quantity	Days Supply	Prescriber Name	Prescriber Janeth #	Payment Method	Dispenser  04/13/2021	04/15/2021	alprazolam 0.5 mg tablet	30	30	Oh, LINUS Garcia)	[id number]	[facility] #[id number]  04/08/2021	04/09/2021	oxycodone-acetaminophen 5-325 mg tablet	21	7	[facility]	[id number]	[facility] #[id number]  03/31/2021	04/01/2021	morphine sulfate ir 15 mg tab	60	15	OhJose H (MD)	[id number]	[facility] #[id number]  03/16/2021	03/16/2021	alprazolam 0.5 mg tablet	21	14	Sharon Bentley	[id number]	[facility] #[id number]  01/12/2021	01/13/2021	morphine sulfate ir 15 mg tab	60	15	OhJose H (MD)	[id number]	[facility] #[id number]  12/02/2020	12/09/2020	morphine sulfate ir 15 mg tab	60	10	OhJose H (MD)	[id number]	Cash	[facility] #[id number]  12/02/2020	12/07/2020	morphine sulf er 30 mg tablet	90	30	OhJose H (MD)	[id number]	Insurance	[facility] #[id number]  11/09/2020	11/10/2020	morphine sulf er 30 mg tablet	90	30	OhJose H (MD)	[id number]	Insurance	[facility] #[id number]  08/17/2020	08/17/2020	hydromorphone 2 mg tablet	40	7	Jose Castro H (MD)	[id number]	Insurance	[facility] #[id number]  08/10/2020	08/10/2020	oxycontin er 10 mg tablet	28	14	Kriss Dunaway M	[id number]	Insurance	[facility] #[id number]  08/10/2020	08/10/2020	hydromorphone 2 mg tablet	20	5	Kriss Dunaway M	[id number]	Insurance	[facility] #[id number]  07/10/2020	07/10/2020	hydromorphone 2 mg tablet	30	10	Sophia Mojica	[id number]	Insurance	[facility] #[id number]  07/07/2020	07/08/2020	oxycodone-acetaminophen 5-325 mg tablet	20	5	Sophia Mojica	[id number]	Insurance	[facility] #[id number]  07/01/2020	07/01/2020	oxycodone-acetaminophen 5-325 mg tablet	30	4	Sophia Mojica	[id number]	Insurance	[facility] #[id number]  06/22/2020	06/25/2020	oxycodone-acetaminophen 5-325 mg tablet	16	4	[facility]	[id number]	Insurance	[facility] #[id number]  06/18/2020	06/18/2020	oxycodone-acetaminophen 5-325 mg tablet	30	7	Sophia Mojica	[id number]	Insurance	[facility] #[id number] Chief Complaint: septic knee    Pain HPI:    Pain in the bilateral knees, worst with movement. The L>R, tightness sensation. The morphine gives him good pain relief, but only lasts 1-2 hours. The pain does not radiate, but when it is severe he says he feels like he starts to have muscle spasms. The pain is constant, but he finds it to be worst during the morning because he has not received pain medication in some time when he sleeps. He is understanding that oral medications may take longer to start working but should last for a longer period of time. The pain is centered at the knees, has slowly been getting better, but notes recently the L knee getting more "tight"          PAST MEDICAL & SURGICAL HISTORY:  Status post percutaneous transluminal coronary angioplasty  2 stents    Atherosclerosis of coronary artery  CAD (coronary artery disease)    Osteoarthritis    HLD (hyperlipidemia)    Blood clot due to device, implant, or graft  was on blood thinners    Diabetes  on insulin pump    HTN (hypertension)    CHF (congestive heart failure)  EF ~ 25%    History of celiac disease    Diverticulitis    STEMI (ST elevation myocardial infarction)    Diabetic neuropathy    Anxiety and depression    Other postprocedural status  Fixation hardware in foot LEFT    Stented coronary artery  10/18 heart attack  INFERIOR WALL MI    S/P CABG x 1  2018    S/P TKR (total knee replacement), right  with infection Mrsa   per pt he was cleared from MRSA infection    Surgery, elective  right knee wound debridement    History of open reduction and internal fixation (ORIF) procedure    H/O shoulder surgery  right    AICD (automatic cardioverter/defibrillator) present    Cholecystostomy care  drain inserted 2020 &amp; removed 4 months ago    History of tonsillectomy    History of hip replacement, total, right        FAMILY HISTORY:  Family history of heart disease (Mother)    Family history of allergies  daughter        SOCIAL HISTORY:  [ ] Denies Smoking, Alcohol, or Drug Use    Allergies    ACE inhibitors (Hives)  carvedilol (Other)  enalapril (Hives)  Entresto (Other)    Intolerances        PAIN MEDICATIONS:  acetaminophen   Tablet .. 650 milliGRAM(s) Oral every 6 hours PRN  DULoxetine 90 milliGRAM(s) Oral daily  gabapentin 100 milliGRAM(s) Oral every 8 hours  melatonin 10 milliGRAM(s) Oral at bedtime PRN  morphine  - Injectable 4 milliGRAM(s) IV Push every 4 hours PRN    Heme:  aspirin enteric coated 81 milliGRAM(s) Oral daily  enoxaparin Injectable 40 milliGRAM(s) SubCutaneous daily  prasugrel 10 milliGRAM(s) Oral daily    Antibiotics:  nafcillin  IVPB 2 Gram(s) IV Intermittent every 4 hours  nafcillin  IVPB        Cardiovascular:  digoxin     Tablet 125 MICROGram(s) Oral daily  furosemide    Tablet 40 milliGRAM(s) Oral daily PRN  metoprolol succinate ER 25 milliGRAM(s) Oral daily  spironolactone 25 milliGRAM(s) Oral daily    GI:  pantoprazole    Tablet 40 milliGRAM(s) Oral before breakfast  polyethylene glycol 3350 17 Gram(s) Oral two times a day  senna 2 Tablet(s) Oral at bedtime    Endocrine:  atorvastatin 80 milliGRAM(s) Oral at bedtime  dextrose 40% Gel 15 Gram(s) Oral once  dextrose 50% Injectable 25 Gram(s) IV Push once  dextrose 50% Injectable 12.5 Gram(s) IV Push once  dextrose 50% Injectable 25 Gram(s) IV Push once  glucagon  Injectable 1 milliGRAM(s) IntraMuscular once  insulin glargine Injectable (LANTUS) 25 Unit(s) SubCutaneous at bedtime  insulin lispro (ADMELOG) corrective regimen sliding scale   SubCutaneous three times a day before meals    All Other Medications:  ascorbic acid 500 milliGRAM(s) Oral daily  chlorhexidine 4% Liquid 1 Application(s) Topical <User Schedule>  dextrose 5%. 1000 milliLiter(s) IV Continuous <Continuous>  dextrose 5%. 1000 milliLiter(s) IV Continuous <Continuous>  multivitamin/minerals 1 Tablet(s) Oral daily  nystatin Cream 1 Application(s) Topical two times a day      REVIEW OF SYSTEMS:    CONSTITUTIONAL: No fever, weight loss, or fatigue  ENMT:  No difficulty hearing, tinnitus, vertigo; No sinus or throat pain  RESPIRATORY: No cough, wheezing, chills or hemoptysis; No shortness of breath  CARDIOVASCULAR: No chest pain, palpitations, dizziness, or leg swelling  GASTROINTESTINAL: No abdominal or epigastric pain. No nausea, vomiting, or hematemesis; No diarrhea or constipation. No melena or hematochezia.  GENITOURINARY: No dysuria, frequency, hematuria, or incontinence  NEUROLOGICAL: No headaches, memory loss, loss of strength, numbness, or tremors  MUSCULOSKELETAL: As per HPI  PSYCHIATRIC: No depression, anxiety, mood swings, or difficulty sleeping  HEME/LYMPH: AC  ALLERY AND IMMUNOLOGIC: No hives or eczema      Vital Signs Last 24 Hrs  T(C): 36.4 (19 May 2021 05:24), Max: 36.6 (18 May 2021 20:56)  T(F): 97.6 (19 May 2021 05:24), Max: 97.8 (18 May 2021 20:56)  HR: 80 (19 May 2021 05:24) (70 - 80)  BP: 128/62 (19 May 2021 05:24) (107/65 - 128/62)  BP(mean): --  RR: 18 (19 May 2021 05:24) (18 - 18)  SpO2: --    PAIN SCORE:         SCALE USED: (1-10 VNRS)             PHYSICAL EXAM:    GENERAL: NAD, well-groomed, well-developed  HEAD:  Atraumatic, Normocephalic  EYES: EOMI, PERRLA, conjunctiva and sclera clear  NERVOUS SYSTEM:  Alert & Oriented X3, Good concentration; Moving all 4 extremities  CHEST/LUNG: Non labored breathing  HEART: Regular rate and rhythm; No murmurs, rubs, or gallops  ABDOMEN: Soft, Nontender, Nondistended; Bowel sounds present  EXTREMITIES:  R knee healed incisions, L knee swollen, RLE w/ abrasions   LYMPH: No lymphadenopathy noted  SKIN: No rashes or lesions        LABS:                          7.3    7.53  )-----------( 470      ( 18 May 2021 06:36 )             25.0                 RADIOLOGY:    Drug Screen:            [ ]  [facility]  Reviewed and Copied to Chart  his report was requested by: Markie Buchanan | Reference #: [id number]    You have not added a JANETH number. Keeping your JANETH number(s) up to date on the My JANETH # page will enable the separation of your prescriptions from others in the search results.    Others' Prescriptions  Patient Name: Juanito Whipple Date: 1955  Address: [address] [place] [id number]: Male  Rx Written	Rx Dispensed	Drug	Quantity	Days Supply	Prescriber Name	Prescriber Janeth #	Payment Method	Dispenser  04/13/2021	04/15/2021	alprazolam 0.5 mg tablet	30	30	Oh, LNIUS Garcia (MD)	[id number]	Insurance	[facility] #[id number]  04/08/2021	04/09/2021	oxycodone-acetaminophen 5-325 mg tablet	21	7	[facility]	[id number]	Insurance	[facility] #[id number]  03/31/2021	04/01/2021	morphine sulfate ir 15 mg tab	60	15	Jose Castro H (MD)	[id number]	[facility] #[id number]  03/16/2021	03/16/2021	alprazolam 0.5 mg tablet	21	14	Sharon Bentley ALISHA	[id number]	Insurance	[facility] #[id number]  01/12/2021	01/13/2021	morphine sulfate ir 15 mg tab	60	15	OhJose H (MD)	[id number]	[facility] #[id number]  12/02/2020	12/09/2020	morphine sulfate ir 15 mg tab	60	10	Jose Castro H (MD)	[id number]	[facility] #[id number]  12/02/2020	12/07/2020	morphine sulf er 30 mg tablet	90	30	OhJose H (MD)	[id number]	[facility] #[id number]  11/09/2020	11/10/2020	morphine sulf er 30 mg tablet	90	30	OhJose H (MD)	[id number]	[facility] #[id number]  08/17/2020	08/17/2020	hydromorphone 2 mg tablet	40	7	Jose Castro H (MD)	[id number]	[facility] #[id number]  08/10/2020	08/10/2020	oxycontin er 10 mg tablet	28	14	Kriss Dunaway M	[id number]	[facility] #[id number]  08/10/2020	08/10/2020	hydromorphone 2 mg tablet	20	5	Kriss Dunaway M	[id number]	[facility] #[id number]  07/10/2020	07/10/2020	hydromorphone 2 mg tablet	30	10	Sophia Mojica	[id number]	[facility] #[id number]  07/07/2020	07/08/2020	oxycodone-acetaminophen 5-325 mg tablet	20	5	Sophia Mojica	[id number]	[facility] #[id number]  07/01/2020	07/01/2020	oxycodone-acetaminophen 5-325 mg tablet	30	4	Sophia Mojica	[id number]	[facility] #[id number]  06/22/2020	06/25/2020	oxycodone-acetaminophen 5-325 mg tablet	16	4	[facility]	[id number]	Insurance	[facility] #[id number]  06/18/2020	06/18/2020	oxycodone-acetaminophen 5-325 mg tablet	30	7	Sophia Mojica	[id number]	Insurance	[facility] #[id number]

## 2021-05-19 NOTE — PROGRESS NOTE ADULT - SUBJECTIVE AND OBJECTIVE BOX
LOIDAFLOWER MADRIGAL  65y, Male  Allergy: ACE inhibitors (Hives)  carvedilol (Other)  enalapril (Hives)  Entresto (Other)      LOS  23d    CHIEF COMPLAINT: Septic arthritis (19 May 2021 09:48)      INTERVAL EVENTS/HPI  - No acute events overnight  - T(F): , Max: 97.8 (05-18-21 @ 20:56)  - Denies any worsening symptoms  - Tolerating medication  - WBC Count: 7.53 (05-18-21 @ 06:36)  - reports minimal improvement in pain -- spasms by lower thigh -- unable to move leg much        ROS  General: Denies rigors, nightsweats  HEENT: Denies headache, rhinorrhea, sore throat, eye pain  CV: Denies CP, palpitations  PULM: Denies wheezing, hemoptysis  GI: Denies hematemesis, hematochezia, melena  : Denies discharge, hematuria  MSK: Denies arthralgias, myalgias  SKIN: Denies rash, lesions  NEURO: Denies paresthesias, weakness  PSYCH: Denies depression, anxiety    VITALS:  T(F): 97.6, Max: 97.8 (05-18-21 @ 20:56)  HR: 80  BP: 128/62  RR: 18Vital Signs Last 24 Hrs  T(C): 36.4 (19 May 2021 05:24), Max: 36.6 (18 May 2021 20:56)  T(F): 97.6 (19 May 2021 05:24), Max: 97.8 (18 May 2021 20:56)  HR: 80 (19 May 2021 05:24) (70 - 80)  BP: 128/62 (19 May 2021 05:24) (107/65 - 128/62)  BP(mean): --  RR: 18 (19 May 2021 05:24) (18 - 18)  SpO2: --    PHYSICAL EXAM:  Gen: NAD, resting in bed  HEENT: Normocephalic, atraumatic  Neck: supple, no lymphadenopathy  CV: Regular rate & regular rhythm  Lungs: decreased BS at bases, no fremitus  Abdomen: Soft, BS present  Ext: Warm, well perfused; Left knee with effusion -- no erythema   Neuro: non focal, awake  Skin: no rash, no erythema  Lines: no phlebitis    FH: Non-contributory  Social Hx: Non-contributory    TESTS & MEASUREMENTS:                        7.3    7.53  )-----------( 470      ( 18 May 2021 06:36 )             25.0                   Culture - Acid Fast - Body Fluid w/Smear (collected 05-11-21 @ 11:54)  Source: .Body Fluid Knee Fluid  Preliminary Report (05-15-21 @ 15:03):    Culture is being performed.    Culture - Body Fluid with Gram Stain (collected 05-09-21 @ 14:10)  Source: .Body Fluid Synovial Fluid  Gram Stain (05-09-21 @ 23:07):    Numerous polymorphonuclear leukocytes per low power field    No organisms seen per oil power field  Preliminary Report (05-10-21 @ 18:43):    No growth    Culture - Blood (collected 05-04-21 @ 05:33)  Source: .Blood None  Final Report (05-09-21 @ 18:00):    No Growth Final    Culture - Blood (collected 05-03-21 @ 07:47)  Source: .Blood None  Final Report (05-08-21 @ 18:00):    No Growth Final    Culture - Blood (collected 05-02-21 @ 06:11)  Source: .Blood None  Final Report (05-07-21 @ 18:01):    No Growth Final    Culture - Body Fluid with Gram Stain (collected 05-01-21 @ 16:43)  Source: Bile biliary drain  Gram Stain (05-02-21 @ 23:26):    Numerous polymorphonuclear leukocytes per low power field    No organisms seen per oil power field  Final Report (05-07-21 @ 17:49):    Few Pseudomonas aeruginosa    Few Enterococcus faecium (vancomycin resistant)  Organism: Pseudomonas aeruginosa  Enterococcus faecium (vancomycin resistant) (05-07-21 @ 17:49)  Organism: Enterococcus faecium (vancomycin resistant) (05-07-21 @ 17:49)      -  Ampicillin: R >8 Predicts results to ampicillin/sulbactam, amoxacillin-clavulanate and  piperacillin-tazobactam.      -  Daptomycin: SDD 4 The breakpoint for SDD (sensitive dose dependent)is based on a dosage regimen of 8-12 mg/kg administered every 24 h in adults and is intended for serious infections due to E. faecium. Consultation with an infectious diseases specialist is recommended.      -  Levofloxacin: R >4      -  Linezolid: S 1      -  Tetra/Doxy: S <=1      -  Vancomycin: R >16      Method Type: YOLIE  Organism: Pseudomonas aeruginosa (05-07-21 @ 17:49)      -  Amikacin: S <=16      -  Aztreonam: S <=4      -  Cefepime: S <=2      -  Ceftazidime: S 4      -  Ciprofloxacin: S <=0.25      -  Gentamicin: S 4      -  Imipenem: S <=1      -  Levofloxacin: S <=0.5      -  Meropenem: S <=1      -  Piperacillin/Tazobactam: S <=8      -  Tobramycin: S <=2      Method Type: YOLIE    Culture - Blood (collected 05-01-21 @ 06:49)  Source: .Blood None  Final Report (05-06-21 @ 18:00):    No Growth Final    Culture - Blood (collected 05-01-21 @ 06:49)  Source: .Blood None  Final Report (05-06-21 @ 18:00):    No Growth Final    Culture - Blood (collected 04-30-21 @ 17:37)  Source: .Blood None  Final Report (05-05-21 @ 23:00):    No Growth Final    Culture - Blood (collected 04-30-21 @ 11:15)  Source: .Blood None  Final Report (05-05-21 @ 23:00):    No Growth Final    Culture - Blood (collected 04-28-21 @ 08:13)  Source: .Blood Blood  Gram Stain (04-29-21 @ 15:21):    Growth in anaerobic bottle: Gram Positive Cocci in Clusters  Final Report (04-30-21 @ 14:03):    Growth in anaerobic bottle: Staphylococcus aureus    See previous culture [id number]    Culture - Blood (collected 04-28-21 @ 08:13)  Source: .Blood Blood  Gram Stain (04-30-21 @ 02:25):    Growth in anaerobic bottle: Gram Positive Cocci in Clusters  Final Report (04-30-21 @ 20:23):    Growth in anaerobic bottle: Staphylococcus aureus    See previous culture [id number]    Culture - Blood (collected 04-27-21 @ 06:14)  Source: .Blood None  Final Report (05-02-21 @ 13:01):    No Growth Final    Culture - Surgical Swab (collected 04-26-21 @ 08:52)  Source: .Surgical Swab None  Final Report (05-01-21 @ 17:29):    Moderate Staphylococcus aureus  Organism: Staphylococcus aureus (05-01-21 @ 17:29)  Organism: Staphylococcus aureus (05-01-21 @ 17:29)      -  Ampicillin/Sulbactam: S <=8/4      -  Cefazolin: S <=4      -  Clindamycin: R <=0.25 This isolate is presumed to be clindamycin resistant based on detection of inducible resistance. Clindamycin may still be effective in some patients.      -  Erythromycin: R >4      -  Gentamicin: S <=1 Should not be used as monotherapy      -  Oxacillin: S <=0.25      -  Penicillin: R 4      -  RIF- Rifampin: S <=1 Should not be used as monotherapy      -  Tetra/Doxy: S <=1      -  Trimethoprim/Sulfamethoxazole: S <=0.5/9.5      -  Vancomycin: S 1      Method Type: YOLIE    Culture - Body Fluid with Gram Stain (collected 04-26-21 @ 01:15)  Source: .Body Fluid Synovial Fluid  Gram Stain (04-26-21 @ 11:35):    polymorphonuclear leukocytes per low power field    No organisms seen per oil power field    by cytocentrifuge  Final Report (05-10-21 @ 10:35):    Numerous Staphylococcus aureus  Organism: Staphylococcus aureus (05-10-21 @ 10:35)  Organism: Staphylococcus aureus (05-10-21 @ 10:35)      -  Ampicillin/Sulbactam: S <=8/4      -  Cefazolin: S <=4      -  Clindamycin: R <=0.25 This isolate is presumed to be clindamycin resistant based on detection of inducible resistance. Clindamycin may still be effective in some patients.      -  Erythromycin: R >4      -  Gentamicin: S <=1 Should not be used as monotherapy      -  Oxacillin: S <=0.25      -  Penicillin: R 4      -  RIF- Rifampin: S <=1 Should not be used as monotherapy      -  Tetra/Doxy: S <=1      -  Trimethoprim/Sulfamethoxazole: S <=0.5/9.5      -  Vancomycin: S 2      Method Type: YOLIE    Culture - Urine (collected 04-26-21 @ 00:31)  Source: .Urine Clean Catch (Midstream)  Final Report (04-27-21 @ 10:57):    <10,000 CFU/mL Normal Urogenital Tricia    Culture - Blood (collected 04-26-21 @ 00:31)  Source: .Blood Blood  Gram Stain (04-27-21 @ 01:48):    Growth in aerobic bottle: Gram Positive Cocci in Clusters  Final Report (04-28-21 @ 16:38):    Growth in aerobic bottle: Staphylococcus aureus    ***Blood Panel PCR results on this specimen are available    approximately 3 hours after the Gram stain result.***    Gram stain, PCR, and/or culture results may not always    correspond due to difference in methodologies.    ************************************************************    This PCR assay was performed by multiplex PCR. This    Assay tests for 66 bacterial and resistance gene targets.    Please refer to the [facility] test directory    at [email or web address] for details.  Organism: Blood Culture PCR  Staphylococcus aureus (04-28-21 @ 16:38)  Organism: Staphylococcus aureus (04-28-21 @ 16:38)      -  Ampicillin/Sulbactam: S <=8/4      -  Cefazolin: S <=4      -  Clindamycin: R 0.5 This isolate is presumed to be clindamycin resistant based on detection of inducible resistance. Clindamycin may still be effective in some patients.      -  Erythromycin: R >4      -  Gentamicin: S <=1 Should not be used as monotherapy      -  Oxacillin: S <=0.25      -  Penicillin: R 4      -  RIF- Rifampin: S <=1 Should not be used as monotherapy      -  Tetra/Doxy: S <=1      -  Trimethoprim/Sulfamethoxazole: S <=0.5/9.5      -  Vancomycin: S 2      Method Type: YOLIE  Organism: Blood Culture PCR (04-28-21 @ 16:38)      -  Staphylococcus aureus: Detec Any isolate of Staphylococcus aureus from a blood culture is NOT considered a contaminant.      Method Type: PCR    Culture - Blood (collected 04-26-21 @ 00:31)  Source: .Blood Blood  Gram Stain (04-27-21 @ 12:53):    Growth in aerobic bottle: Gram Positive Cocci in Clusters    Growth in anaerobic bottle: Gram Positive Cocci in Clusters  Final Report (04-28-21 @ 16:39):    Growth in aerobic and anaerobic bottles: Staphylococcus aureus    See previous culture [id number]            INFECTIOUS DISEASES TESTING  COVID-19 PCR: NotDetec (05-02-21 @ 08:52)  COVID-19 PCR: Detected (04-29-21 @ 14:33)  COVID-19 PCR: NotDetec (04-26-21 @ 06:00)  COVID-19 PCR: NotDetec (03-12-21 @ 11:00)  COVID-19 PCR: Detected (02-05-21 @ 13:47)  COVID-19 PCR: NotDetec (10-13-20 @ 09:08)  COVID-19 PCR: NotDetec (10-03-20 @ 19:54)  COVID-19 PCR: NotDetec (09-01-20 @ 20:40)  COVID-19 PCR: NotDetec (07-30-20 @ 07:43)  COVID-19 PCR: NotDetec (07-16-20 @ 19:46)      INFLAMMATORY MARKERS  Sedimentation Rate, Erythrocyte: 127 mm/Hr (05-18-21 @ 06:36)  C-Reactive Protein, Serum: 59 mg/L (05-18-21 @ 06:36)  Sedimentation Rate, Erythrocyte: 106 mm/Hr (05-15-21 @ 08:56)  C-Reactive Protein, Serum: 81 mg/L (05-15-21 @ 08:56)      RADIOLOGY & ADDITIONAL TESTS:  I have personally reviewed the last available Chest xray  CXR      CT      CARDIOLOGY TESTING      MEDICATIONS  ascorbic acid 500 Oral daily  aspirin enteric coated 81 Oral daily  atorvastatin 80 Oral at bedtime  chlorhexidine 4% Liquid 1 Topical <User Schedule>  dextrose 40% Gel 15 Oral once  dextrose 5%. 1000 IV Continuous <Continuous>  dextrose 5%. 1000 IV Continuous <Continuous>  dextrose 50% Injectable 25 IV Push once  dextrose 50% Injectable 12.5 IV Push once  dextrose 50% Injectable 25 IV Push once  digoxin     Tablet 125 Oral daily  DULoxetine 90 Oral daily  enoxaparin Injectable 40 SubCutaneous daily  gabapentin 100 Oral every 8 hours  glucagon  Injectable 1 IntraMuscular once  insulin glargine Injectable (LANTUS) 25 SubCutaneous at bedtime  insulin lispro (ADMELOG) corrective regimen sliding scale  SubCutaneous three times a day before meals  metoprolol succinate ER 25 Oral daily  multivitamin/minerals 1 Oral daily  nafcillin  IVPB 2 IV Intermittent every 4 hours  nafcillin  IVPB     nystatin Cream 1 Topical two times a day  pantoprazole    Tablet 40 Oral before breakfast  polyethylene glycol 3350 17 Oral two times a day  prasugrel 10 Oral daily  senna 2 Oral at bedtime  spironolactone 25 Oral daily      WEIGHT  Weight (kg): 81 (05-05-21 @ 13:12)      ANTIBIOTICS:  nafcillin  IVPB 2 Gram(s) IV Intermittent every 4 hours  nafcillin  IVPB          All available historical records have been reviewed

## 2021-05-19 NOTE — CONSULT NOTE ADULT - ASSESSMENT
65 M w/ septic L knee s/p washout and effusions s/p arthrocentesis c/b mssa bacereemia, DM2, CHF, CAD consult for pain management in patient with acute on chronic pain of L and R knees  - Would consider oxycodone 5mg q4h PRN for moderate (4-7) pain OR oxycodone 10mg q4h PRN for sereve (8-10) pain  - Patient says he has manageable analgesia with morphine 4mg which would be less than the total morphine equivalent for the oxycodone 10mg  - Main complaint is the duration of the medication - oral medications should help with this  - Tylenol 1000mg TID around the clock  - NSAIDS if not contraindicated   - Warm/Cold Compresses to affected knees  - Lidoderm patch to intact skin over painful area, 12 hours on, 12 hours off  - Bowel regimen  - Naloxone for reversal should it be needed   65 M w/ septic L knee s/p washout and effusions s/p arthrocentesis c/b mssa bacereemia, DM2, CHF, CAD consult for pain management in patient with acute on chronic pain of L and R knees  - Would consider oxycodone 5mg q4h PRN for moderate (4-7) pain OR oxycodone 10mg q4h PRN for sereve (8-10) pain  - Patient says he has manageable analgesia with morphine 4mg which would be less than the total morphine equivalent for the oxycodone 10mg  - Main complaint is the duration of the medication - oral medications should help with this  - Tylenol 1000mg TID around the clock  - Consider increase gabapentin 300mg TID  - Baclofen 5mg q8h PRN for muscle spams  - NSAIDS if not contraindicated   - Warm/Cold Compresses to affected knees  - Lidoderm patch to intact skin over painful area, 12 hours on, 12 hours off  - Bowel regimen  - Naloxone for reversal should it be needed

## 2021-05-19 NOTE — PROGRESS NOTE ADULT - ASSESSMENT
ASSESSMENT  65 year old male with PMH of CAD s/p MI, PCI/CABG, CHFrEF (15-20%), has ICD, HTN, celiac disease, DM, neuropathy, chronic b/l LE ulcers presents, severe chronic pain,  hx infected R TKR with MRSA (was eventually cemented so has limited ROM R knee), and history of cholecystostomy tube placement in February 2020 for acute cholecystitis s/p removal in May 2020 with multiple presentations including persistent bilious drainage from the prior tract site as well as a RUQ abdominal abscess at the site s/p drainage who presents with left knee pain    IMPRESSION  #Septic Arthritis of left knee with MSSA bacteremia  - s/p arthrocentesis of left knee - consistent with baterial septic infection   - s/p OR washout 4/26 -- purulent fluid in left knee with significant tricompartmental arthritis   - Blood Cx 4/26 MSSA  - OR Cx 4/26 MSSA  - Blood Cx 4/27 NG, 4/28 growing Staph   - Blood Cx 4/30-5/4 NG  - JOHN PAUL 5/5: no evidence of valvular or lead vegation; noted mild, non-mobile atheroma seen in the thoracic aorta.   - MR Knee w/wo IV Cont, Left (05.06.21 @ 19:05): Septic arthritis of the knee with osteomyelitis in the distal femur and proximal tibia. Associated large joint effusion with extensive synovitis. Noadditional collection identified.  - s/p knee tap 5/9 -- 15 cc of blood fluid; WBC 13209 (95% PMN)  - s/p knee tap 5/11 WBC 72451 (97% PMN)    #Biliary Drainage from previous perc sudhir site  - Previous Intraabdominal 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 Decreased area of right upper quadrant subcutaneous stranding at site of prior percutaneous cholecystostomy, without evidence of abscess.  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    #CHF s/p ICD  #DM   #Abx allergy: carvedilol (Other)  enalapril (Hives)  Entresto (Other)    Creatinine, Serum: 1.0 mg/dL (04.25.21 @ 22:15)  Weight (kg): 81 (26 Apr 2021 17:23)    RECOMMENDATIONS  - nafcillin 2g q 4 hours  - still some effusion -- follow-up ortho for any further drainage   - pain control per primary  - given large joint effusion seen on MRI 5/6 -- would plan 6 weeks of antibiotics from time of last drainage (5/11)  - will eventually need PICC to finish course with cefazoling 2g q 8 hours    Please call or message on [employer] Teams if with any questions.  Spectra [phone]

## 2021-05-19 NOTE — PROGRESS NOTE ADULT - ASSESSMENT
A/P:-  Pt is seen and evaluated by bedside.     1. Septic arthritis of Left knee with severe synovitis, s/p washout with recurrent effusion, s/p arthrocentesis 05/11 --sample compromised    2. 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 MSSA bacteremia   4. 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 Chronic iron deficiency anemia with component of ACD  6. DMII   7. Chronic HFrEF s/p AICD   8. CAD    - XR bilateral knees (04/26) large joint effusion in left knee  - s/p arthroscopic drainage of left knee on 04/26 and OR washout 4/26 - 20mL purulent fluid in left knee -  - Synovial Culture (04/26) MSSA   s/p  5/11 Arthrocentesis yielded 5 ml bloody fluid-Specimen compromised though   - Blood Culture (04/26) x2, 04/27 NGTD, 04/28 x2 SJ, 04/30-05/01 NGTD, 05/02 and 05/03 received  - JOHN PAUL  - no endocarditis noted   - currently on nefcillin 2 q4 -->plan to discharge on cefazolin 2 q8 as per ID for total of 6 weeks   - needs repeat MRI of knee for posterior collection   - continue with pain control   - History of acute cholecystitis s/p cholecystostomy tube placement in February 2020 s/p removal in May 2020, with fistula formation and persistent bilious drainage from the prior tract site as well as a RUQ abdominal abscess at the site s/p drainage  - Previous Intraabdominal 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 Decreased area of right upper quadrant subcutaneous stranding at site of prior percutaneous cholecystostomy, without evidence of abscess.  - HIDA scan 05/02: cannot visualize GB  - as per advance GI Dr. Dixon outpatient ERCP and cholecystectomy to divert bile and heal fistula, recommended vac wound  - no further intervention as per IR and surgery   - hb level stable at this time - no active bleeding noted   - Last a1c 9.1 04/29  - off insulin pump, as he has lost it   - Endocrinology reconsulted, Basal bolus insulin ordered However dropped his BS 5/12 after receiving short acting insulin, now refusing even lower dose of short acting insulin   titrate lantus to 25 from 30 U at night     - EP on board: s/p interrogation of AICD 04/28 (normally functioning)  - Cardiology Dr Nelson is on board  - Continue Lasix PO 40mg PRN and aldactone 25mg QD   - Continue Digoxin 125mg QD  - Continue Aspirin 81mg/ Prasugrel 10mg, Rosuvastatin 40mg and toprol   Pain mgmt consult appreciated           Plan for repeat aspiration per ortho noted   DC  home?  pending Ortho clearance

## 2021-05-20 LAB
B PERT IGG+IGM PNL SER: ABNORMAL
COLOR FLD: SIGNIFICANT CHANGE UP
FLUID INTAKE SUBSTANCE CLASS: SIGNIFICANT CHANGE UP
FLUID SEGMENTED GRANULOCYTES: SIGNIFICANT CHANGE UP %
GLUCOSE BLDC GLUCOMTR-MCNC: 179 MG/DL — HIGH (ref 70–99)
GLUCOSE BLDC GLUCOMTR-MCNC: 180 MG/DL — HIGH (ref 70–99)
GLUCOSE BLDC GLUCOMTR-MCNC: 217 MG/DL — HIGH (ref 70–99)
GLUCOSE BLDC GLUCOMTR-MCNC: 222 MG/DL — HIGH (ref 70–99)
GLUCOSE BLDC GLUCOMTR-MCNC: 267 MG/DL — HIGH (ref 70–99)
GRAM STN FLD: SIGNIFICANT CHANGE UP
LYMPHOCYTES # FLD: SIGNIFICANT CHANGE UP
MONOS+MACROS # FLD: SIGNIFICANT CHANGE UP %
RCV VOL RI: HIGH /UL (ref 0–0)
TOTAL NUCLEATED CELL COUNT, BODY FLUID: SIGNIFICANT CHANGE UP /UL
TUBE TYPE: SIGNIFICANT CHANGE UP

## 2021-05-20 PROCEDURE — 99232 SBSQ HOSP IP/OBS MODERATE 35: CPT

## 2021-05-20 RX ADMIN — GABAPENTIN 300 MILLIGRAM(S): 400 CAPSULE ORAL at 14:26

## 2021-05-20 RX ADMIN — Medication 1: at 11:56

## 2021-05-20 RX ADMIN — GABAPENTIN 300 MILLIGRAM(S): 400 CAPSULE ORAL at 05:20

## 2021-05-20 RX ADMIN — GABAPENTIN 300 MILLIGRAM(S): 400 CAPSULE ORAL at 22:42

## 2021-05-20 RX ADMIN — Medication 500 MILLIGRAM(S): at 11:57

## 2021-05-20 RX ADMIN — OXYCODONE HYDROCHLORIDE 10 MILLIGRAM(S): 5 TABLET ORAL at 17:29

## 2021-05-20 RX ADMIN — SPIRONOLACTONE 25 MILLIGRAM(S): 25 TABLET, FILM COATED ORAL at 05:20

## 2021-05-20 RX ADMIN — POLYETHYLENE GLYCOL 3350 17 GRAM(S): 17 POWDER, FOR SOLUTION ORAL at 05:20

## 2021-05-20 RX ADMIN — OXYCODONE HYDROCHLORIDE 5 MILLIGRAM(S): 5 TABLET ORAL at 12:30

## 2021-05-20 RX ADMIN — NYSTATIN CREAM 1 APPLICATION(S): 100000 CREAM TOPICAL at 05:20

## 2021-05-20 RX ADMIN — CHLORHEXIDINE GLUCONATE 1 APPLICATION(S): 213 SOLUTION TOPICAL at 05:20

## 2021-05-20 RX ADMIN — Medication 125 MICROGRAM(S): at 05:20

## 2021-05-20 RX ADMIN — OXYCODONE HYDROCHLORIDE 5 MILLIGRAM(S): 5 TABLET ORAL at 12:00

## 2021-05-20 RX ADMIN — PANTOPRAZOLE SODIUM 40 MILLIGRAM(S): 20 TABLET, DELAYED RELEASE ORAL at 05:20

## 2021-05-20 RX ADMIN — Medication 81 MILLIGRAM(S): at 11:57

## 2021-05-20 RX ADMIN — Medication 25 MILLIGRAM(S): at 05:20

## 2021-05-20 RX ADMIN — NAFCILLIN 200 GRAM(S): 10 INJECTION, POWDER, FOR SOLUTION INTRAVENOUS at 02:22

## 2021-05-20 RX ADMIN — ENOXAPARIN SODIUM 40 MILLIGRAM(S): 100 INJECTION SUBCUTANEOUS at 11:58

## 2021-05-20 RX ADMIN — Medication 1 TABLET(S): at 11:57

## 2021-05-20 RX ADMIN — NAFCILLIN 200 GRAM(S): 10 INJECTION, POWDER, FOR SOLUTION INTRAVENOUS at 14:29

## 2021-05-20 RX ADMIN — NAFCILLIN 200 GRAM(S): 10 INJECTION, POWDER, FOR SOLUTION INTRAVENOUS at 09:10

## 2021-05-20 RX ADMIN — POLYETHYLENE GLYCOL 3350 17 GRAM(S): 17 POWDER, FOR SOLUTION ORAL at 17:27

## 2021-05-20 RX ADMIN — NAFCILLIN 200 GRAM(S): 10 INJECTION, POWDER, FOR SOLUTION INTRAVENOUS at 17:30

## 2021-05-20 RX ADMIN — INSULIN GLARGINE 25 UNIT(S): 100 INJECTION, SOLUTION SUBCUTANEOUS at 22:47

## 2021-05-20 RX ADMIN — NAFCILLIN 200 GRAM(S): 10 INJECTION, POWDER, FOR SOLUTION INTRAVENOUS at 22:42

## 2021-05-20 RX ADMIN — OXYCODONE HYDROCHLORIDE 10 MILLIGRAM(S): 5 TABLET ORAL at 06:15

## 2021-05-20 RX ADMIN — Medication 2: at 16:43

## 2021-05-20 RX ADMIN — NYSTATIN CREAM 1 APPLICATION(S): 100000 CREAM TOPICAL at 17:27

## 2021-05-20 RX ADMIN — ATORVASTATIN CALCIUM 80 MILLIGRAM(S): 80 TABLET, FILM COATED ORAL at 22:42

## 2021-05-20 RX ADMIN — OXYCODONE HYDROCHLORIDE 10 MILLIGRAM(S): 5 TABLET ORAL at 05:20

## 2021-05-20 RX ADMIN — NAFCILLIN 200 GRAM(S): 10 INJECTION, POWDER, FOR SOLUTION INTRAVENOUS at 05:19

## 2021-05-20 RX ADMIN — SENNA PLUS 2 TABLET(S): 8.6 TABLET ORAL at 22:43

## 2021-05-20 RX ADMIN — PRASUGREL 10 MILLIGRAM(S): 5 TABLET, FILM COATED ORAL at 11:57

## 2021-05-20 RX ADMIN — MORPHINE SULFATE 4 MILLIGRAM(S): 50 CAPSULE, EXTENDED RELEASE ORAL at 20:51

## 2021-05-20 RX ADMIN — DULOXETINE HYDROCHLORIDE 90 MILLIGRAM(S): 30 CAPSULE, DELAYED RELEASE ORAL at 11:57

## 2021-05-20 RX ADMIN — OXYCODONE HYDROCHLORIDE 10 MILLIGRAM(S): 5 TABLET ORAL at 18:00

## 2021-05-20 NOTE — PROGRESS NOTE ADULT - SUBJECTIVE AND OBJECTIVE BOX
Patient is a 65y old  Male who presents with a chief complaint of Septic arthritis (05 May 2021 07:23)      S   L knee pain still there and not bending his knee much            Vital Signs Last 24 Hrs  T(C): 36.8 (05 May 2021 12:00), Max: 36.8 (04 May 2021 21:35)  T(F): 98.3 (05 May 2021 12:00), Max: 98.3 (04 May 2021 21:35)  HR: 77 (05 May 2021 12:00) (77 - 79)  BP: 101/56 (05 May 2021 12:00) (101/56 - 118/62)  BP(mean): --  RR: 16 (05 May 2021 12:00) (16 - 18)  SpO2: --    PHYSICAL EXAM:   NAD; Normocephalic;   LUNGS - no wheezing  HEART: S1 S2+   ABDOMEN: Soft, Nontender, non distended  EXTREMITIES: no cyanosis; no edema L knee dressed   NERVOUS SYSTEM:  Awake and alert; no focal neuro deficits appreciated      LABS:                        8.0    10.62 )-----------( 374      ( 05 May 2021 05:12 )             27.3     05-05    135  |  99  |  17  ----------------------------<  346<H>  4.3   |  24  |  0.9    Ca    8.2<L>      05 May 2021 05:12  Mg     2.0     05-04    TPro  6.3  /  Alb  2.7<L>  /  TBili  0.5  /  DBili  x   /  AST  12  /  ALT  14  /  AlkPhos  95  05-05        CAPILLARY BLOOD GLUCOSE

## 2021-05-20 NOTE — PROGRESS NOTE ADULT - ASSESSMENT
A/P:-  Pt is seen and evaluated by bedside.     1. Septic arthritis of Left knee with severe synovitis, s/p washout with recurrent effusion, s/p arthrocentesis 05/11 --sample compromised    2. 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 MSSA bacteremia   4. 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 Chronic iron deficiency anemia with component of ACD  6. DMII   7. Chronic HFrEF s/p AICD   8. CAD    - XR bilateral knees (04/26) large joint effusion in left knee  - s/p arthroscopic drainage of left knee on 04/26 and OR washout 4/26 - 20mL purulent fluid in left knee -  - Synovial Culture (04/26) MSSA   s/p  5/11 Arthrocentesis yielded 5 ml bloody fluid-Specimen compromised though   - Blood Culture (04/26) x2, 04/27 NGTD, 04/28 x2 SJ, 04/30-05/01 NGTD, 05/02 and 05/03 received  - JOHN PAUL  - no endocarditis noted   - currently on nefcillin 2 q4 -->plan to discharge on cefazolin 2 q8 as per ID for total of 6 weeks    - continue with pain control   - History of acute cholecystitis s/p cholecystostomy tube placement in February 2020 s/p removal in May 2020, with fistula formation and persistent bilious drainage from the prior tract site as well as a RUQ abdominal abscess at the site s/p drainage  - Previous Intraabdominal 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 Decreased area of right upper quadrant subcutaneous stranding at site of prior percutaneous cholecystostomy, without evidence of abscess.  - HIDA scan 05/02: cannot visualize GB  - as per advance GI Dr. Dixon outpatient ERCP and cholecystectomy to divert bile and heal fistula, recommended vac wound  - no further intervention as per IR and surgery   - hb level stable at this time - no active bleeding noted   - Last a1c 9.1 04/29  - off insulin pump, as he has lost it   - Endocrinology reconsulted, Basal bolus insulin ordered However dropped his BS 5/12 after receiving short acting insulin, now refusing even lower dose of short acting insulin   titrate lantus to 25 from 30 U at night     - EP on board: s/p interrogation of AICD 04/28 (normally functioning)  - Cardiology Dr Nelson is on board  - Continue Lasix PO 40mg PRN and aldactone 25mg QD   - Continue Digoxin 125mg QD  - Continue Aspirin 81mg/ Prasugrel 10mg, Rosuvastatin 40mg and toprol   Pain mgmt consult appreciated           Plan for repeat aspiration per ortho noted   DC  home?  pending Ortho clearance

## 2021-05-20 NOTE — PROGRESS NOTE ADULT - SUBJECTIVE AND OBJECTIVE BOX
Pt. seen/ examined  Labs/Vitals reviewed  L knee effusion increased significantly  Wounds C/D/I  No erythema  Significant pain w/ attempted ROM

## 2021-05-20 NOTE — PROGRESS NOTE ADULT - ASSESSMENT
Again discussed serial aspirations treatment plan vs possible I&D  After discussion of risks/ benefits/ alternatives, L knee aspirated under strict sterile conditions  Arthrocentesis yielded 2 ml bloody fluid  Sent cell count, Gram stain, cultures  Will follow - if worsening clinical picture/ WBC count, may proceed with repeat I&D - will discuss with ID service

## 2021-05-21 ENCOUNTER — APPOINTMENT (OUTPATIENT)
Dept: NEUROLOGY | Facility: CLINIC | Age: 66
End: 2021-05-21

## 2021-05-21 LAB
GLUCOSE BLDC GLUCOMTR-MCNC: 197 MG/DL — HIGH (ref 70–99)
GLUCOSE BLDC GLUCOMTR-MCNC: 210 MG/DL — HIGH (ref 70–99)
GLUCOSE BLDC GLUCOMTR-MCNC: 222 MG/DL — HIGH (ref 70–99)
GLUCOSE BLDC GLUCOMTR-MCNC: 244 MG/DL — HIGH (ref 70–99)
GRAM STN FLD: SIGNIFICANT CHANGE UP
SPECIMEN SOURCE: SIGNIFICANT CHANGE UP

## 2021-05-21 PROCEDURE — 99232 SBSQ HOSP IP/OBS MODERATE 35: CPT

## 2021-05-21 RX ADMIN — Medication 5 MILLIGRAM(S): at 01:25

## 2021-05-21 RX ADMIN — NYSTATIN CREAM 1 APPLICATION(S): 100000 CREAM TOPICAL at 17:12

## 2021-05-21 RX ADMIN — GABAPENTIN 300 MILLIGRAM(S): 400 CAPSULE ORAL at 13:44

## 2021-05-21 RX ADMIN — OXYCODONE HYDROCHLORIDE 10 MILLIGRAM(S): 5 TABLET ORAL at 18:33

## 2021-05-21 RX ADMIN — OXYCODONE HYDROCHLORIDE 10 MILLIGRAM(S): 5 TABLET ORAL at 07:54

## 2021-05-21 RX ADMIN — Medication 125 MICROGRAM(S): at 06:09

## 2021-05-21 RX ADMIN — GABAPENTIN 300 MILLIGRAM(S): 400 CAPSULE ORAL at 21:55

## 2021-05-21 RX ADMIN — GABAPENTIN 300 MILLIGRAM(S): 400 CAPSULE ORAL at 06:10

## 2021-05-21 RX ADMIN — OXYCODONE HYDROCHLORIDE 10 MILLIGRAM(S): 5 TABLET ORAL at 20:18

## 2021-05-21 RX ADMIN — MORPHINE SULFATE 4 MILLIGRAM(S): 50 CAPSULE, EXTENDED RELEASE ORAL at 09:37

## 2021-05-21 RX ADMIN — PRASUGREL 10 MILLIGRAM(S): 5 TABLET, FILM COATED ORAL at 11:29

## 2021-05-21 RX ADMIN — NAFCILLIN 200 GRAM(S): 10 INJECTION, POWDER, FOR SOLUTION INTRAVENOUS at 13:44

## 2021-05-21 RX ADMIN — Medication 25 MILLIGRAM(S): at 06:10

## 2021-05-21 RX ADMIN — NAFCILLIN 200 GRAM(S): 10 INJECTION, POWDER, FOR SOLUTION INTRAVENOUS at 09:37

## 2021-05-21 RX ADMIN — Medication 500 MILLIGRAM(S): at 11:29

## 2021-05-21 RX ADMIN — DULOXETINE HYDROCHLORIDE 90 MILLIGRAM(S): 30 CAPSULE, DELAYED RELEASE ORAL at 11:29

## 2021-05-21 RX ADMIN — Medication 81 MILLIGRAM(S): at 11:29

## 2021-05-21 RX ADMIN — ATORVASTATIN CALCIUM 80 MILLIGRAM(S): 80 TABLET, FILM COATED ORAL at 21:55

## 2021-05-21 RX ADMIN — INSULIN GLARGINE 25 UNIT(S): 100 INJECTION, SOLUTION SUBCUTANEOUS at 21:55

## 2021-05-21 RX ADMIN — Medication 1: at 07:55

## 2021-05-21 RX ADMIN — SPIRONOLACTONE 25 MILLIGRAM(S): 25 TABLET, FILM COATED ORAL at 06:10

## 2021-05-21 RX ADMIN — MORPHINE SULFATE 4 MILLIGRAM(S): 50 CAPSULE, EXTENDED RELEASE ORAL at 18:33

## 2021-05-21 RX ADMIN — Medication 2: at 17:12

## 2021-05-21 RX ADMIN — NYSTATIN CREAM 1 APPLICATION(S): 100000 CREAM TOPICAL at 06:11

## 2021-05-21 RX ADMIN — Medication 2: at 11:29

## 2021-05-21 RX ADMIN — ENOXAPARIN SODIUM 40 MILLIGRAM(S): 100 INJECTION SUBCUTANEOUS at 11:29

## 2021-05-21 RX ADMIN — NAFCILLIN 200 GRAM(S): 10 INJECTION, POWDER, FOR SOLUTION INTRAVENOUS at 21:55

## 2021-05-21 RX ADMIN — MORPHINE SULFATE 4 MILLIGRAM(S): 50 CAPSULE, EXTENDED RELEASE ORAL at 13:50

## 2021-05-21 RX ADMIN — NAFCILLIN 200 GRAM(S): 10 INJECTION, POWDER, FOR SOLUTION INTRAVENOUS at 17:12

## 2021-05-21 RX ADMIN — CHLORHEXIDINE GLUCONATE 1 APPLICATION(S): 213 SOLUTION TOPICAL at 06:09

## 2021-05-21 RX ADMIN — OXYCODONE HYDROCHLORIDE 10 MILLIGRAM(S): 5 TABLET ORAL at 20:48

## 2021-05-21 RX ADMIN — Medication 1 TABLET(S): at 11:29

## 2021-05-21 RX ADMIN — PANTOPRAZOLE SODIUM 40 MILLIGRAM(S): 20 TABLET, DELAYED RELEASE ORAL at 07:54

## 2021-05-21 RX ADMIN — Medication 5 MILLIGRAM(S): at 18:54

## 2021-05-21 RX ADMIN — SENNA PLUS 2 TABLET(S): 8.6 TABLET ORAL at 21:55

## 2021-05-21 NOTE — PROGRESS NOTE ADULT - ASSESSMENT
MRI pending  Discussed plan with Dr. Degroot and the patient  MRI needed to evaluate for possible walled-off abscess  Possible return to OR for re-I&D depending on MRI results  Discussed possible treatment failure due to poor vasculature and failure to deliver antibiotics to the infection site  Will follow

## 2021-05-21 NOTE — CHART NOTE - NSCHARTNOTEFT_GEN_A_CORE
cx from 5/20 tap still showing gram pos cocci   the pt had an MRI on 5/6  he has a [employer] defibrillator, the company worked with the MRI team and the test was performed successfully .  MRI was contacted and they are happy to do this again .  primary team will order,, a new MRI with and without contrast   we will review this exam prior to any decision to make a return trip to the OR .  cont abx

## 2021-05-21 NOTE — PROGRESS NOTE ADULT - ASSESSMENT
ASSESSMENT  65 year old male with PMH of CAD s/p MI, PCI/CABG, CHFrEF (15-20%), has ICD, HTN, celiac disease, DM, neuropathy, chronic b/l LE ulcers presents, severe chronic pain,  hx infected R TKR with MRSA (was eventually cemented so has limited ROM R knee), and history of cholecystostomy tube placement in February 2020 for acute cholecystitis s/p removal in May 2020 with multiple presentations including persistent bilious drainage from the prior tract site as well as a RUQ abdominal abscess at the site s/p drainage who presents with left knee pain    IMPRESSION  #Septic Arthritis of left knee with MSSA bacteremia  - s/p arthrocentesis of left knee - consistent with baterial septic infection   - s/p OR washout 4/26 -- purulent fluid in left knee with significant tricompartmental arthritis   - Blood Cx 4/26 MSSA  - OR Cx 4/26 MSSA  - Blood Cx 4/27 NG, 4/28 growing Staph   - Blood Cx 4/30-5/4 NG  - JOHN PAUL 5/5: no evidence of valvular or lead vegation; noted mild, non-mobile atheroma seen in the thoracic aorta.   - MR Knee w/wo IV Cont, Left (05.06.21 @ 19:05): Septic arthritis of the knee with osteomyelitis in the distal femur and proximal tibia. Associated large joint effusion with extensive synovitis. Noadditional collection identified.  - s/p knee tap 5/9 -- 15 cc of blood fluid; WBC 14397 (95% PMN)  - s/p knee tap 5/11 WBC 04780 (97% PMN)    #Biliary Drainage from previous perc sudhir site  - Previous Intraabdominal 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 Decreased area of right upper quadrant subcutaneous stranding at site of prior percutaneous cholecystostomy, without evidence of abscess.  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    #CHF s/p ICD  #DM   #Abx allergy: carvedilol (Other)  enalapril (Hives)  Entresto (Other)    Creatinine, Serum: 1.0 mg/dL (04.25.21 @ 22:15)  Weight (kg): 81 (26 Apr 2021 17:23)    RECOMMENDATIONS  - appreciate ortho input -- planned for MRI over weekend   - nafcillin 2g q 4 hours  - repeat CBC/CMP/ESR/CRP over weekend  - will eventually need PICC to finish course with cefazoling 2g q 8 hours    Please call or message on Microsoft Teams if with any questions.  Spectra [phone]

## 2021-05-21 NOTE — PROGRESS NOTE ADULT - SUBJECTIVE AND OBJECTIVE BOX
Pt. seen/ examined  No change in swelling of L knee  No erythema  No draincage  Labs/ vitals reviewed

## 2021-05-21 NOTE — PROGRESS NOTE ADULT - SUBJECTIVE AND OBJECTIVE BOX
FLOWER MARISCAL  65y, Male  Allergy: ACE inhibitors (Hives)  carvedilol (Other)  enalapril (Hives)  Entresto (Other)      LOS  25d    CHIEF COMPLAINT: Septic arthritis (21 May 2021 14:05)      INTERVAL EVENTS/HPI  - No acute events overnight  - T(F): , Max: 99.2 (05-21-21 @ 05:00)  - Denies any worsening symptoms  - Tolerating medication  -  continued left leg pain   -      ROS  General: Denies rigors, nightsweats  HEENT: Denies headache, rhinorrhea, sore throat, eye pain  CV: Denies CP, palpitations  PULM: Denies wheezing, hemoptysis  GI: Denies hematemesis, hematochezia, melena  : Denies discharge, hematuria  MSK: Denies arthralgias, myalgias  SKIN: Denies rash, lesions  NEURO: Denies paresthesias, weakness  PSYCH: Denies depression, anxiety    VITALS:  T(F): 98.9, Max: 99.2 (05-21-21 @ 05:00)  HR: 77  BP: 128/67  RR: 18Vital Signs Last 24 Hrs  T(C): 37.2 (21 May 2021 12:44), Max: 37.3 (21 May 2021 05:00)  T(F): 98.9 (21 May 2021 12:44), Max: 99.2 (21 May 2021 05:00)  HR: 77 (21 May 2021 12:44) (77 - 88)  BP: 128/67 (21 May 2021 12:44) (124/68 - 137/71)  BP(mean): --  RR: 18 (21 May 2021 12:44) (18 - 18)  SpO2: --    PHYSICAL EXAM:  Gen: NAD, resting in bed  HEENT: Normocephalic, atraumatic  Neck: supple, no lymphadenopathy  CV: Regular rate & regular rhythm  Lungs: decreased BS at bases, no fremitus  Abdomen: Soft, BS present  Ext: Warm, well perfused  Neuro: non focal, awake  Skin: no rash, no erythema  Lines: no phlebitis    FH: Non-contributory  Social Hx: Non-contributory    TESTS & MEASUREMENTS:                  Culture - Body Fluid with Gram Stain (collected 05-20-21 @ 16:57)  Source: .Body Fluid Synovial Fluid  Gram Stain (05-21-21 @ 07:48):    No polymorphonuclear leukocytes seen    No organisms seen    by cytocentrifuge    Culture - Acid Fast - Body Fluid w/Smear (collected 05-11-21 @ 11:54)  Source: .Body Fluid Knee Fluid  Preliminary Report (05-19-21 @ 15:03):    No growth at 1 week.    Culture - Body Fluid with Gram Stain (collected 05-09-21 @ 14:10)  Source: .Body Fluid Synovial Fluid  Gram Stain (05-09-21 @ 23:07):    Numerous polymorphonuclear leukocytes per low power field    No organisms seen per oil power field  Preliminary Report (05-10-21 @ 18:43):    No growth    Culture - Blood (collected 05-04-21 @ 05:33)  Source: .Blood None  Final Report (05-09-21 @ 18:00):    No Growth Final    Culture - Blood (collected 05-03-21 @ 07:47)  Source: .Blood None  Final Report (05-08-21 @ 18:00):    No Growth Final    Culture - Blood (collected 05-02-21 @ 06:11)  Source: .Blood None  Final Report (05-07-21 @ 18:01):    No Growth Final    Culture - Body Fluid with Gram Stain (collected 05-01-21 @ 16:43)  Source: Bile biliary drain  Gram Stain (05-02-21 @ 23:26):    Numerous polymorphonuclear leukocytes per low power field    No organisms seen per oil power field  Final Report (05-07-21 @ 17:49):    Few Pseudomonas aeruginosa    Few Enterococcus faecium (vancomycin resistant)  Organism: Pseudomonas aeruginosa  Enterococcus faecium (vancomycin resistant) (05-07-21 @ 17:49)  Organism: Enterococcus faecium (vancomycin resistant) (05-07-21 @ 17:49)      -  Ampicillin: R >8 Predicts results to ampicillin/sulbactam, amoxacillin-clavulanate and  piperacillin-tazobactam.      -  Daptomycin: SDD 4 The breakpoint for SDD (sensitive dose dependent)is based on a dosage regimen of 8-12 mg/kg administered every 24 h in adults and is intended for serious infections due to E. faecium. Consultation with an infectious diseases specialist is recommended.      -  Levofloxacin: R >4      -  Linezolid: S 1      -  Tetra/Doxy: S <=1      -  Vancomycin: R >16      Method Type: YOLIE  Organism: Pseudomonas aeruginosa (05-07-21 @ 17:49)      -  Amikacin: S <=16      -  Aztreonam: S <=4      -  Cefepime: S <=2      -  Ceftazidime: S 4      -  Ciprofloxacin: S <=0.25      -  Gentamicin: S 4      -  Imipenem: S <=1      -  Levofloxacin: S <=0.5      -  Meropenem: S <=1      -  Piperacillin/Tazobactam: S <=8      -  Tobramycin: S <=2      Method Type: YOLIE    Culture - Blood (collected 05-01-21 @ 06:49)  Source: .Blood None  Final Report (05-06-21 @ 18:00):    No Growth Final    Culture - Blood (collected 05-01-21 @ 06:49)  Source: .Blood None  Final Report (05-06-21 @ 18:00):    No Growth Final    Culture - Blood (collected 04-30-21 @ 17:37)  Source: .Blood None  Final Report (05-05-21 @ 23:00):    No Growth Final    Culture - Blood (collected 04-30-21 @ 11:15)  Source: .Blood None  Final Report (05-05-21 @ 23:00):    No Growth Final    Culture - Blood (collected 04-28-21 @ 08:13)  Source: .Blood Blood  Gram Stain (04-29-21 @ 15:21):    Growth in anaerobic bottle: Gram Positive Cocci in Clusters  Final Report (04-30-21 @ 14:03):    Growth in anaerobic bottle: Staphylococcus aureus    See previous culture [id number]    Culture - Blood (collected 04-28-21 @ 08:13)  Source: .Blood Blood  Gram Stain (04-30-21 @ 02:25):    Growth in anaerobic bottle: Gram Positive Cocci in Clusters  Final Report (04-30-21 @ 20:23):    Growth in anaerobic bottle: Staphylococcus aureus    See previous culture [id number]    Culture - Blood (collected 04-27-21 @ 06:14)  Source: .Blood None  Final Report (05-02-21 @ 13:01):    No Growth Final    Culture - Surgical Swab (collected 04-26-21 @ 08:52)  Source: .Surgical Swab None  Final Report (05-01-21 @ 17:29):    Moderate Staphylococcus aureus  Organism: Staphylococcus aureus (05-01-21 @ 17:29)  Organism: Staphylococcus aureus (05-01-21 @ 17:29)      -  Ampicillin/Sulbactam: S <=8/4      -  Cefazolin: S <=4      -  Clindamycin: R <=0.25 This isolate is presumed to be clindamycin resistant based on detection of inducible resistance. Clindamycin may still be effective in some patients.      -  Erythromycin: R >4      -  Gentamicin: S <=1 Should not be used as monotherapy      -  Oxacillin: S <=0.25      -  Penicillin: R 4      -  RIF- Rifampin: S <=1 Should not be used as monotherapy      -  Tetra/Doxy: S <=1      -  Trimethoprim/Sulfamethoxazole: S <=0.5/9.5      -  Vancomycin: S 1      Method Type: YOLIE    Culture - Body Fluid with Gram Stain (collected 04-26-21 @ 01:15)  Source: .Body Fluid Synovial Fluid  Gram Stain (04-26-21 @ 11:35):    polymorphonuclear leukocytes per low power field    No organisms seen per oil power field    by cytocentrifuge  Final Report (05-10-21 @ 10:35):    Numerous Staphylococcus aureus  Organism: Staphylococcus aureus (05-10-21 @ 10:35)  Organism: Staphylococcus aureus (05-10-21 @ 10:35)      -  Ampicillin/Sulbactam: S <=8/4      -  Cefazolin: S <=4      -  Clindamycin: R <=0.25 This isolate is presumed to be clindamycin resistant based on detection of inducible resistance. Clindamycin may still be effective in some patients.      -  Erythromycin: R >4      -  Gentamicin: S <=1 Should not be used as monotherapy      -  Oxacillin: S <=0.25      -  Penicillin: R 4      -  RIF- Rifampin: S <=1 Should not be used as monotherapy      -  Tetra/Doxy: S <=1      -  Trimethoprim/Sulfamethoxazole: S <=0.5/9.5      -  Vancomycin: S 2      Method Type: YOLIE    Culture - Urine (collected 04-26-21 @ 00:31)  Source: .Urine Clean Catch (Midstream)  Final Report (04-27-21 @ 10:57):    <10,000 CFU/mL Normal Urogenital Tricia    Culture - Blood (collected 04-26-21 @ 00:31)  Source: .Blood Blood  Gram Stain (04-27-21 @ 01:48):    Growth in aerobic bottle: Gram Positive Cocci in Clusters  Final Report (04-28-21 @ 16:38):    Growth in aerobic bottle: Staphylococcus aureus    ***Blood Panel PCR results on this specimen are available    approximately 3 hours after the Gram stain result.***    Gram stain, PCR, and/or culture results may not always    correspond due to difference in methodologies.    ************************************************************    This PCR assay was performed by multiplex PCR. This    Assay tests for 66 bacterial and resistance gene targets.    Please refer to the [facility] test directory    at [email or web address] for details.  Organism: Blood Culture PCR  Staphylococcus aureus (04-28-21 @ 16:38)  Organism: Staphylococcus aureus (04-28-21 @ 16:38)      -  Ampicillin/Sulbactam: S <=8/4      -  Cefazolin: S <=4      -  Clindamycin: R 0.5 This isolate is presumed to be clindamycin resistant based on detection of inducible resistance. Clindamycin may still be effective in some patients.      -  Erythromycin: R >4      -  Gentamicin: S <=1 Should not be used as monotherapy      -  Oxacillin: S <=0.25      -  Penicillin: R 4      -  RIF- Rifampin: S <=1 Should not be used as monotherapy      -  Tetra/Doxy: S <=1      -  Trimethoprim/Sulfamethoxazole: S <=0.5/9.5      -  Vancomycin: S 2      Method Type: YOLIE  Organism: Blood Culture PCR (04-28-21 @ 16:38)      -  Staphylococcus aureus: Detec Any isolate of Staphylococcus aureus from a blood culture is NOT considered a contaminant.      Method Type: PCR    Culture - Blood (collected 04-26-21 @ 00:31)  Source: .Blood Blood  Gram Stain (04-27-21 @ 12:53):    Growth in aerobic bottle: Gram Positive Cocci in Clusters    Growth in anaerobic bottle: Gram Positive Cocci in Clusters  Final Report (04-28-21 @ 16:39):    Growth in aerobic and anaerobic bottles: Staphylococcus aureus    See previous culture [id number]            INFECTIOUS DISEASES TESTING  COVID-19 PCR: NotDetec (05-02-21 @ 08:52)  COVID-19 PCR: Detected (04-29-21 @ 14:33)  COVID-19 PCR: NotDetec (04-26-21 @ 06:00)  COVID-19 PCR: NotDetec (03-12-21 @ 11:00)  COVID-19 PCR: Detected (02-05-21 @ 13:47)  COVID-19 PCR: NotDetec (10-13-20 @ 09:08)  COVID-19 PCR: NotDetec (10-03-20 @ 19:54)  COVID-19 PCR: NotDetec (09-01-20 @ 20:40)  COVID-19 PCR: NotDetec (07-30-20 @ 07:43)  COVID-19 PCR: NotDetec (07-16-20 @ 19:46)      INFLAMMATORY MARKERS  Sedimentation Rate, Erythrocyte: 127 mm/Hr (05-18-21 @ 06:36)  C-Reactive Protein, Serum: 59 mg/L (05-18-21 @ 06:36)  Sedimentation Rate, Erythrocyte: 106 mm/Hr (05-15-21 @ 08:56)  C-Reactive Protein, Serum: 81 mg/L (05-15-21 @ 08:56)      RADIOLOGY & ADDITIONAL TESTS:  I have personally reviewed the last available Chest xray  CXR      CT      CARDIOLOGY TESTING      MEDICATIONS  ascorbic acid 500 Oral daily  aspirin enteric coated 81 Oral daily  atorvastatin 80 Oral at bedtime  chlorhexidine 4% Liquid 1 Topical <User Schedule>  dextrose 40% Gel 15 Oral once  dextrose 5%. 1000 IV Continuous <Continuous>  dextrose 5%. 1000 IV Continuous <Continuous>  dextrose 50% Injectable 25 IV Push once  dextrose 50% Injectable 12.5 IV Push once  dextrose 50% Injectable 25 IV Push once  digoxin     Tablet 125 Oral daily  DULoxetine 90 Oral daily  enoxaparin Injectable 40 SubCutaneous daily  gabapentin 300 Oral three times a day  glucagon  Injectable 1 IntraMuscular once  insulin glargine Injectable (LANTUS) 25 SubCutaneous at bedtime  insulin lispro (ADMELOG) corrective regimen sliding scale  SubCutaneous three times a day before meals  metoprolol succinate ER 25 Oral daily  multivitamin/minerals 1 Oral daily  nafcillin  IVPB 2 IV Intermittent every 4 hours  nafcillin  IVPB     nystatin Cream 1 Topical two times a day  pantoprazole    Tablet 40 Oral before breakfast  polyethylene glycol 3350 17 Oral two times a day  prasugrel 10 Oral daily  senna 2 Oral at bedtime  spironolactone 25 Oral daily      WEIGHT  Weight (kg): 81 (05-05-21 @ 13:12)      ANTIBIOTICS:  nafcillin  IVPB 2 Gram(s) IV Intermittent every 4 hours  nafcillin  IVPB          All available historical records have been reviewed

## 2021-05-21 NOTE — PROGRESS NOTE ADULT - ASSESSMENT
A/P:-  Pt is seen and evaluated by bedside.     1. Septic arthritis of Left knee with severe synovitis, s/p washout with recurrent effusion, s/p arthrocentesis 05/11 --sample compromised    2. 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 MSSA bacteremia   4. 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 Chronic iron deficiency anemia with component of ACD  6. DMII   7. Chronic HFrEF s/p AICD   8. CAD    - XR bilateral knees (04/26) large joint effusion in left knee  - s/p arthroscopic drainage of left knee on 04/26 and OR washout 4/26 - 20mL purulent fluid in left knee -  - Synovial Culture (04/26) MSSA   s/p  5/11 Arthrocentesis yielded 5 ml bloody fluid-0Specimen compromised though) arthro 5/20 GPC   - Blood Culture (04/26) x2, 04/27 NGTD, 04/28 x2 SJ, 04/30-05/01 NGTD, 05/02 and 05/03 received  - JOHN PAUL  - no endocarditis noted   - currently on nefcillin 2 q4 -->When cleared by ortho----plan to discharge on cefazolin 2 q8 as per ID for total of 6 weeks    - continue with pain control   Plan MRI 5/21     - History of acute cholecystitis s/p cholecystostomy tube placement in February 2020 s/p removal in May 2020, with fistula formation and persistent bilious drainage from the prior tract site as well as a RUQ abdominal abscess at the site s/p drainage  - Previous Intraabdominal 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 Decreased area of right upper quadrant subcutaneous stranding at site of prior percutaneous cholecystostomy, without evidence of abscess.  - HIDA scan 05/02: cannot visualize GB  - as per advance GI Dr. Dixon outpatient ERCP and cholecystectomy to divert bile and heal fistula, recommended vac wound  - no further intervention as per IR and surgery   - hb level stable at this time - no active bleeding noted   - Last a1c 9.1 04/29  - off insulin pump, as he has lost it   - Endocrinology reconsulted, Basal bolus insulin ordered However dropped his BS 5/12 after receiving short acting insulin, now refusing even lower dose of short acting insulin   titrate lantus to 25 from 30 U at night     - EP on board: s/p interrogation of AICD 04/28 (normally functioning)  - Cardiology Dr Nelson is on board  - Continue Lasix PO 40mg PRN and aldactone 25mg QD   - Continue Digoxin 125mg QD  - Continue Aspirin 81mg/ Prasugrel 10mg, Rosuvastatin 40mg and toprol   Pain mgmt consult appreciated           Plan for MRI knee   DC  home?  pending Ortho clearance

## 2021-05-22 LAB
GLUCOSE BLDC GLUCOMTR-MCNC: 122 MG/DL — HIGH (ref 70–99)
GLUCOSE BLDC GLUCOMTR-MCNC: 126 MG/DL — HIGH (ref 70–99)
GLUCOSE BLDC GLUCOMTR-MCNC: 186 MG/DL — HIGH (ref 70–99)
GLUCOSE BLDC GLUCOMTR-MCNC: 252 MG/DL — HIGH (ref 70–99)

## 2021-05-22 PROCEDURE — 93287 PERI-PX DEVICE EVAL & PRGR: CPT | Mod: 26

## 2021-05-22 PROCEDURE — 99232 SBSQ HOSP IP/OBS MODERATE 35: CPT

## 2021-05-22 RX ADMIN — NYSTATIN CREAM 1 APPLICATION(S): 100000 CREAM TOPICAL at 05:47

## 2021-05-22 RX ADMIN — INSULIN GLARGINE 25 UNIT(S): 100 INJECTION, SOLUTION SUBCUTANEOUS at 21:26

## 2021-05-22 RX ADMIN — Medication 125 MICROGRAM(S): at 05:46

## 2021-05-22 RX ADMIN — OXYCODONE HYDROCHLORIDE 10 MILLIGRAM(S): 5 TABLET ORAL at 20:24

## 2021-05-22 RX ADMIN — GABAPENTIN 300 MILLIGRAM(S): 400 CAPSULE ORAL at 13:51

## 2021-05-22 RX ADMIN — NAFCILLIN 200 GRAM(S): 10 INJECTION, POWDER, FOR SOLUTION INTRAVENOUS at 05:46

## 2021-05-22 RX ADMIN — SPIRONOLACTONE 25 MILLIGRAM(S): 25 TABLET, FILM COATED ORAL at 05:46

## 2021-05-22 RX ADMIN — OXYCODONE HYDROCHLORIDE 5 MILLIGRAM(S): 5 TABLET ORAL at 23:33

## 2021-05-22 RX ADMIN — OXYCODONE HYDROCHLORIDE 5 MILLIGRAM(S): 5 TABLET ORAL at 17:03

## 2021-05-22 RX ADMIN — Medication 0.5 MILLIGRAM(S): at 10:24

## 2021-05-22 RX ADMIN — NAFCILLIN 200 GRAM(S): 10 INJECTION, POWDER, FOR SOLUTION INTRAVENOUS at 13:51

## 2021-05-22 RX ADMIN — ENOXAPARIN SODIUM 40 MILLIGRAM(S): 100 INJECTION SUBCUTANEOUS at 11:20

## 2021-05-22 RX ADMIN — OXYCODONE HYDROCHLORIDE 10 MILLIGRAM(S): 5 TABLET ORAL at 00:30

## 2021-05-22 RX ADMIN — DULOXETINE HYDROCHLORIDE 90 MILLIGRAM(S): 30 CAPSULE, DELAYED RELEASE ORAL at 11:20

## 2021-05-22 RX ADMIN — OXYCODONE HYDROCHLORIDE 10 MILLIGRAM(S): 5 TABLET ORAL at 10:57

## 2021-05-22 RX ADMIN — POLYETHYLENE GLYCOL 3350 17 GRAM(S): 17 POWDER, FOR SOLUTION ORAL at 05:47

## 2021-05-22 RX ADMIN — OXYCODONE HYDROCHLORIDE 10 MILLIGRAM(S): 5 TABLET ORAL at 05:54

## 2021-05-22 RX ADMIN — Medication 81 MILLIGRAM(S): at 11:20

## 2021-05-22 RX ADMIN — NYSTATIN CREAM 1 APPLICATION(S): 100000 CREAM TOPICAL at 17:04

## 2021-05-22 RX ADMIN — PRASUGREL 10 MILLIGRAM(S): 5 TABLET, FILM COATED ORAL at 11:20

## 2021-05-22 RX ADMIN — OXYCODONE HYDROCHLORIDE 10 MILLIGRAM(S): 5 TABLET ORAL at 01:00

## 2021-05-22 RX ADMIN — OXYCODONE HYDROCHLORIDE 10 MILLIGRAM(S): 5 TABLET ORAL at 10:27

## 2021-05-22 RX ADMIN — Medication 1 TABLET(S): at 11:20

## 2021-05-22 RX ADMIN — GABAPENTIN 300 MILLIGRAM(S): 400 CAPSULE ORAL at 05:46

## 2021-05-22 RX ADMIN — NAFCILLIN 200 GRAM(S): 10 INJECTION, POWDER, FOR SOLUTION INTRAVENOUS at 21:26

## 2021-05-22 RX ADMIN — OXYCODONE HYDROCHLORIDE 5 MILLIGRAM(S): 5 TABLET ORAL at 17:33

## 2021-05-22 RX ADMIN — PANTOPRAZOLE SODIUM 40 MILLIGRAM(S): 20 TABLET, DELAYED RELEASE ORAL at 05:46

## 2021-05-22 RX ADMIN — Medication 5 MILLIGRAM(S): at 21:28

## 2021-05-22 RX ADMIN — GABAPENTIN 300 MILLIGRAM(S): 400 CAPSULE ORAL at 21:26

## 2021-05-22 RX ADMIN — NAFCILLIN 200 GRAM(S): 10 INJECTION, POWDER, FOR SOLUTION INTRAVENOUS at 01:37

## 2021-05-22 RX ADMIN — ATORVASTATIN CALCIUM 80 MILLIGRAM(S): 80 TABLET, FILM COATED ORAL at 21:26

## 2021-05-22 RX ADMIN — SENNA PLUS 2 TABLET(S): 8.6 TABLET ORAL at 21:26

## 2021-05-22 RX ADMIN — Medication 25 MILLIGRAM(S): at 05:46

## 2021-05-22 RX ADMIN — CHLORHEXIDINE GLUCONATE 1 APPLICATION(S): 213 SOLUTION TOPICAL at 05:47

## 2021-05-22 RX ADMIN — Medication 500 MILLIGRAM(S): at 11:20

## 2021-05-22 RX ADMIN — OXYCODONE HYDROCHLORIDE 10 MILLIGRAM(S): 5 TABLET ORAL at 14:21

## 2021-05-22 RX ADMIN — NAFCILLIN 200 GRAM(S): 10 INJECTION, POWDER, FOR SOLUTION INTRAVENOUS at 17:03

## 2021-05-22 RX ADMIN — OXYCODONE HYDROCHLORIDE 10 MILLIGRAM(S): 5 TABLET ORAL at 19:51

## 2021-05-22 RX ADMIN — OXYCODONE HYDROCHLORIDE 10 MILLIGRAM(S): 5 TABLET ORAL at 06:20

## 2021-05-22 RX ADMIN — NAFCILLIN 200 GRAM(S): 10 INJECTION, POWDER, FOR SOLUTION INTRAVENOUS at 10:28

## 2021-05-22 RX ADMIN — Medication 5 MILLIGRAM(S): at 10:28

## 2021-05-22 RX ADMIN — OXYCODONE HYDROCHLORIDE 10 MILLIGRAM(S): 5 TABLET ORAL at 13:51

## 2021-05-22 NOTE — PROGRESS NOTE ADULT - ASSESSMENT
A/P:-  Pt is seen and evaluated by bedside.     1. Septic arthritis of Left knee with severe synovitis, s/p washout with recurrent effusion, s/p arthrocentesis 05/11 --sample compromised    2. 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 MSSA bacteremia   4. 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 Chronic iron deficiency anemia with component of ACD  6. DMII   7. Chronic HFrEF s/p AICD   8. CAD    - XR bilateral knees (04/26) large joint effusion in left knee  - s/p arthroscopic drainage of left knee on 04/26 and OR washout 4/26 - 20mL purulent fluid in left knee -  - Synovial Culture (04/26) MSSA   s/p  5/11 Arthrocentesis yielded 5 ml bloody fluid-0Specimen compromised though) arthro 5/20 GPC   - Blood Culture (04/26) x2, 04/27 NGTD, 04/28 x2 SJ, 04/30-05/01 NGTD, 05/02 and 05/03 received  - JOHN PAUL  - no endocarditis noted   - currently on nefcillin 2 q4 -->When cleared by ortho----plan to discharge on cefazolin 2 q8 as per ID for total of 6 weeks    - continue with pain control   Plan MRI 5/21     - History of acute cholecystitis s/p cholecystostomy tube placement in February 2020 s/p removal in May 2020, with fistula formation and persistent bilious drainage from the prior tract site as well as a RUQ abdominal abscess at the site s/p drainage  - Previous Intraabdominal 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 Decreased area of right upper quadrant subcutaneous stranding at site of prior percutaneous cholecystostomy, without evidence of abscess.  - HIDA scan 05/02: cannot visualize GB  - as per advance GI Dr. Dixon outpatient ERCP and cholecystectomy to divert bile and heal fistula, recommended vac wound  - no further intervention as per IR and surgery   - hb level stable at this time - no active bleeding noted   - Last a1c 9.1 04/29  - off insulin pump, as he has lost it   - Endocrinology reconsulted, Basal bolus insulin ordered However dropped his BS 5/12 after receiving short acting insulin, now refusing even lower dose of short acting insulin   titrate lantus to 25 from 30 U at night     - EP on board: s/p interrogation of AICD 04/28 (normally functioning)  - Cardiology Dr Nelson is on board  - Continue Lasix PO 40mg PRN and aldactone 25mg QD   - Continue Digoxin 125mg QD  - Continue Aspirin 81mg/ Prasugrel 10mg, Rosuvastatin 40mg and toprol   Pain mgmt consult appreciated           Plan for MRI knee   DC  home?  pending Ortho clearance              A/P:-      1. Septic arthritis of Left knee with severe synovitis, s/p washout with recurrent effusion, s/p arthrocentesis 05/11 --sample compromised    2. 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 MSSA bacteremia   4. 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 Chronic iron deficiency anemia with component of ACD  6. DMII   7. Chronic HFrEF s/p AICD   8. CAD    - XR bilateral knees (04/26) large joint effusion in left knee  - s/p arthroscopic drainage of left knee on 04/26 and OR washout 4/26 - 20mL purulent fluid in left knee -  - Synovial Culture (04/26) MSSA   s/p  5/11 Arthrocentesis yielded 5 ml bloody fluid-0Specimen compromised though) arthro 5/20 GPC   - Blood Culture (04/26) x2, 04/27 NGTD, 04/28 x2 SJ, 04/30-05/01 NGTD, 05/02 and 05/03 received  - JOHN PAUL  - no endocarditis noted   - currently on nefcillin 2 q4 -->When cleared by ortho----plan to discharge on cefazolin 2 q8 as per ID for total of 6 weeks    - continue with pain control    MRI 5/21 attempted however he didnt go through it He defecated in bed while in MRI. He was given IV ativan by me still he didnt go through it.  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 Decreased area of right upper quadrant subcutaneous stranding at site of prior percutaneous cholecystostomy, without evidence of abscess.  - HIDA scan 05/02: cannot visualize GB  - as per advance GI Dr. Dixon outpatient ERCP and cholecystectomy to divert bile and heal fistula, recommended vac wound  - no further intervention as per IR and surgery   - hb level stable at this time - no active bleeding noted   - Last a1c 9.1 04/29  - off insulin pump, as he has lost it   - Endocrinology reconsulted, Basal bolus insulin ordered However dropped his BS 5/12 after receiving short acting insulin, now refusing even lower dose of short acting insulin   titrate lantus to 25 from 30 U at night     - EP on board: s/p interrogation of AICD 04/28 (normally functioning)  - Cardiology Dr Nelson is on board  - Continue Lasix PO 40mg PRN and aldactone 25mg QD   - Continue Digoxin 125mg QD  - Continue Aspirin 81mg/ Prasugrel 10mg, Rosuvastatin 40mg and toprol   Pain mgmt consult appreciated             DC  home?  pending Ortho clearance

## 2021-05-22 NOTE — PROGRESS NOTE ADULT - SUBJECTIVE AND OBJECTIVE BOX
Patient is a 65y old  Male who presents with a chief complaint of Septic arthritis (05 May 2021 07:23)      S  Got anxious in MRI.  Received ativan by me and RN    L knee pain still there and not bending his knee much              Vital Signs Last 24 Hrs  T(C): 36.8 (05 May 2021 12:00), Max: 36.8 (04 May 2021 21:35)  T(F): 98.3 (05 May 2021 12:00), Max: 98.3 (04 May 2021 21:35)  HR: 77 (05 May 2021 12:00) (77 - 79)  BP: 101/56 (05 May 2021 12:00) (101/56 - 118/62)  BP(mean): --  RR: 16 (05 May 2021 12:00) (16 - 18)  SpO2: --    PHYSICAL EXAM:   NAD; Normocephalic;   LUNGS - no wheezing  HEART: S1 S2+   ABDOMEN: Soft, Nontender, non distended  EXTREMITIES: no cyanosis; no edema L knee dressed   NERVOUS SYSTEM:  Awake and alert; no focal neuro deficits appreciated      LABS:                        8.0    10.62 )-----------( 374      ( 05 May 2021 05:12 )             27.3     05-05    135  |  99  |  17  ----------------------------<  346<H>  4.3   |  24  |  0.9    Ca    8.2<L>      05 May 2021 05:12  Mg     2.0     05-04    TPro  6.3  /  Alb  2.7<L>  /  TBili  0.5  /  DBili  x   /  AST  12  /  ALT  14  /  AlkPhos  95  05-05        CAPILLARY BLOOD GLUCOSE                   Patient is a 65y old  Male who presents with a chief complaint of Septic arthritis (05 May 2021 07:23)      S  Got anxious in MRI.  Received IV ativan by me and RN   he had pooped while in MRI  Later patient told me he couldnt go through MRI " They need to completely sedate me so i dont feel anything"   L knee pain still there and not bending his knee much                  PHYSICAL EXAM:   NAD; Normocephalic;   LUNGS - no wheezing  HEART: S1 S2+   ABDOMEN: Soft, Nontender, non distended  EXTREMITIES: no cyanosis; no edema L knee dressed   NERVOUS SYSTEM:  Awake and alert; no focal neuro deficits appreciated                                            Lactate Trend            CAPILLARY BLOOD GLUCOSE      POCT Blood Glucose.: 126 mg/dL (22 May 2021 16:27)                                Lactate Trend            CAPILLARY BLOOD GLUCOSE      POCT Blood Glucose.: 126 mg/dL (22 May 2021 16:27)      MEDICATIONS  (STANDING):  ascorbic acid 500 milliGRAM(s) Oral daily  aspirin enteric coated 81 milliGRAM(s) Oral daily  atorvastatin 80 milliGRAM(s) Oral at bedtime  chlorhexidine 4% Liquid 1 Application(s) Topical <User Schedule>  dextrose 40% Gel 15 Gram(s) Oral once  dextrose 5%. 1000 milliLiter(s) (50 mL/Hr) IV Continuous <Continuous>  dextrose 5%. 1000 milliLiter(s) (100 mL/Hr) IV Continuous <Continuous>  dextrose 50% Injectable 25 Gram(s) IV Push once  dextrose 50% Injectable 12.5 Gram(s) IV Push once  dextrose 50% Injectable 25 Gram(s) IV Push once  digoxin     Tablet 125 MICROGram(s) Oral daily  DULoxetine 90 milliGRAM(s) Oral daily  enoxaparin Injectable 40 milliGRAM(s) SubCutaneous daily  gabapentin 300 milliGRAM(s) Oral three times a day  glucagon  Injectable 1 milliGRAM(s) IntraMuscular once  insulin glargine Injectable (LANTUS) 25 Unit(s) SubCutaneous at bedtime  insulin lispro (ADMELOG) corrective regimen sliding scale   SubCutaneous three times a day before meals  metoprolol succinate ER 25 milliGRAM(s) Oral daily  multivitamin/minerals 1 Tablet(s) Oral daily  nafcillin  IVPB 2 Gram(s) IV Intermittent every 4 hours  nafcillin  IVPB      nystatin Cream 1 Application(s) Topical two times a day  pantoprazole    Tablet 40 milliGRAM(s) Oral before breakfast  polyethylene glycol 3350 17 Gram(s) Oral two times a day  prasugrel 10 milliGRAM(s) Oral daily  senna 2 Tablet(s) Oral at bedtime  spironolactone 25 milliGRAM(s) Oral daily    MEDICATIONS  (PRN):  acetaminophen   Tablet .. 650 milliGRAM(s) Oral every 6 hours PRN Temp greater or equal to 38C (100.4F), Mild Pain (1 - 3)  baclofen 5 milliGRAM(s) Oral every 8 hours PRN Muscle Spasm  furosemide    Tablet 40 milliGRAM(s) Oral daily PRN fluid overload  melatonin 10 milliGRAM(s) Oral at bedtime PRN Insomnia  oxyCODONE    IR 5 milliGRAM(s) Oral every 4 hours PRN Moderate Pain (4 - 6)  oxyCODONE    IR 10 milliGRAM(s) Oral every 4 hours PRN Severe Pain (7 - 10)

## 2021-05-23 LAB
ALBUMIN SERPL ELPH-MCNC: 2.7 G/DL — LOW (ref 3.5–5.2)
ALP SERPL-CCNC: 82 U/L — SIGNIFICANT CHANGE UP (ref 30–115)
ALT FLD-CCNC: 11 U/L — SIGNIFICANT CHANGE UP (ref 0–41)
ANION GAP SERPL CALC-SCNC: 6 MMOL/L — LOW (ref 7–14)
AST SERPL-CCNC: 15 U/L — SIGNIFICANT CHANGE UP (ref 0–41)
BILIRUB SERPL-MCNC: 0.5 MG/DL — SIGNIFICANT CHANGE UP (ref 0.2–1.2)
BUN SERPL-MCNC: 13 MG/DL — SIGNIFICANT CHANGE UP (ref 10–20)
CALCIUM SERPL-MCNC: 7.9 MG/DL — LOW (ref 8.5–10.1)
CHLORIDE SERPL-SCNC: 99 MMOL/L — SIGNIFICANT CHANGE UP (ref 98–110)
CO2 SERPL-SCNC: 26 MMOL/L — SIGNIFICANT CHANGE UP (ref 17–32)
CREAT SERPL-MCNC: 0.9 MG/DL — SIGNIFICANT CHANGE UP (ref 0.7–1.5)
CULTURE RESULTS: SIGNIFICANT CHANGE UP
GLUCOSE BLDC GLUCOMTR-MCNC: 136 MG/DL — HIGH (ref 70–99)
GLUCOSE BLDC GLUCOMTR-MCNC: 140 MG/DL — HIGH (ref 70–99)
GLUCOSE BLDC GLUCOMTR-MCNC: 199 MG/DL — HIGH (ref 70–99)
GLUCOSE BLDC GLUCOMTR-MCNC: 227 MG/DL — HIGH (ref 70–99)
GLUCOSE SERPL-MCNC: 217 MG/DL — HIGH (ref 70–99)
HCT VFR BLD CALC: 26.5 % — LOW (ref 42–52)
HGB BLD-MCNC: 7.8 G/DL — LOW (ref 14–18)
MCHC RBC-ENTMCNC: 21.3 PG — LOW (ref 27–31)
MCHC RBC-ENTMCNC: 29.4 G/DL — LOW (ref 32–37)
MCV RBC AUTO: 72.4 FL — LOW (ref 80–94)
NRBC # BLD: 0 /100 WBCS — SIGNIFICANT CHANGE UP (ref 0–0)
PLATELET # BLD AUTO: 469 K/UL — HIGH (ref 130–400)
POTASSIUM SERPL-MCNC: 4.9 MMOL/L — SIGNIFICANT CHANGE UP (ref 3.5–5)
POTASSIUM SERPL-SCNC: 4.9 MMOL/L — SIGNIFICANT CHANGE UP (ref 3.5–5)
PROT SERPL-MCNC: 6.4 G/DL — SIGNIFICANT CHANGE UP (ref 6–8)
RBC # BLD: 3.66 M/UL — LOW (ref 4.7–6.1)
RBC # FLD: 23 % — HIGH (ref 11.5–14.5)
SODIUM SERPL-SCNC: 131 MMOL/L — LOW (ref 135–146)
SPECIMEN SOURCE: SIGNIFICANT CHANGE UP
WBC # BLD: 8.28 K/UL — SIGNIFICANT CHANGE UP (ref 4.8–10.8)
WBC # FLD AUTO: 8.28 K/UL — SIGNIFICANT CHANGE UP (ref 4.8–10.8)

## 2021-05-23 PROCEDURE — 99232 SBSQ HOSP IP/OBS MODERATE 35: CPT

## 2021-05-23 RX ORDER — MORPHINE SULFATE 50 MG/1
4 CAPSULE, EXTENDED RELEASE ORAL EVERY 4 HOURS
Refills: 0 | Status: DISCONTINUED | OUTPATIENT
Start: 2021-05-23 | End: 2021-05-27

## 2021-05-23 RX ADMIN — SPIRONOLACTONE 25 MILLIGRAM(S): 25 TABLET, FILM COATED ORAL at 05:15

## 2021-05-23 RX ADMIN — Medication 81 MILLIGRAM(S): at 11:11

## 2021-05-23 RX ADMIN — GABAPENTIN 300 MILLIGRAM(S): 400 CAPSULE ORAL at 21:15

## 2021-05-23 RX ADMIN — ENOXAPARIN SODIUM 40 MILLIGRAM(S): 100 INJECTION SUBCUTANEOUS at 11:11

## 2021-05-23 RX ADMIN — PRASUGREL 10 MILLIGRAM(S): 5 TABLET, FILM COATED ORAL at 11:11

## 2021-05-23 RX ADMIN — OXYCODONE HYDROCHLORIDE 10 MILLIGRAM(S): 5 TABLET ORAL at 10:45

## 2021-05-23 RX ADMIN — GABAPENTIN 300 MILLIGRAM(S): 400 CAPSULE ORAL at 14:29

## 2021-05-23 RX ADMIN — Medication 500 MILLIGRAM(S): at 11:11

## 2021-05-23 RX ADMIN — Medication 125 MICROGRAM(S): at 05:15

## 2021-05-23 RX ADMIN — DULOXETINE HYDROCHLORIDE 90 MILLIGRAM(S): 30 CAPSULE, DELAYED RELEASE ORAL at 11:11

## 2021-05-23 RX ADMIN — NAFCILLIN 200 GRAM(S): 10 INJECTION, POWDER, FOR SOLUTION INTRAVENOUS at 02:24

## 2021-05-23 RX ADMIN — PANTOPRAZOLE SODIUM 40 MILLIGRAM(S): 20 TABLET, DELAYED RELEASE ORAL at 05:15

## 2021-05-23 RX ADMIN — MORPHINE SULFATE 4 MILLIGRAM(S): 50 CAPSULE, EXTENDED RELEASE ORAL at 18:45

## 2021-05-23 RX ADMIN — NAFCILLIN 200 GRAM(S): 10 INJECTION, POWDER, FOR SOLUTION INTRAVENOUS at 21:15

## 2021-05-23 RX ADMIN — MORPHINE SULFATE 4 MILLIGRAM(S): 50 CAPSULE, EXTENDED RELEASE ORAL at 15:27

## 2021-05-23 RX ADMIN — Medication 1 TABLET(S): at 11:11

## 2021-05-23 RX ADMIN — Medication 25 MILLIGRAM(S): at 05:15

## 2021-05-23 RX ADMIN — OXYCODONE HYDROCHLORIDE 10 MILLIGRAM(S): 5 TABLET ORAL at 18:45

## 2021-05-23 RX ADMIN — NYSTATIN CREAM 1 APPLICATION(S): 100000 CREAM TOPICAL at 10:07

## 2021-05-23 RX ADMIN — NAFCILLIN 200 GRAM(S): 10 INJECTION, POWDER, FOR SOLUTION INTRAVENOUS at 05:21

## 2021-05-23 RX ADMIN — OXYCODONE HYDROCHLORIDE 10 MILLIGRAM(S): 5 TABLET ORAL at 06:26

## 2021-05-23 RX ADMIN — OXYCODONE HYDROCHLORIDE 5 MILLIGRAM(S): 5 TABLET ORAL at 00:05

## 2021-05-23 RX ADMIN — NAFCILLIN 200 GRAM(S): 10 INJECTION, POWDER, FOR SOLUTION INTRAVENOUS at 17:04

## 2021-05-23 RX ADMIN — GABAPENTIN 300 MILLIGRAM(S): 400 CAPSULE ORAL at 05:15

## 2021-05-23 RX ADMIN — NYSTATIN CREAM 1 APPLICATION(S): 100000 CREAM TOPICAL at 17:05

## 2021-05-23 RX ADMIN — ATORVASTATIN CALCIUM 80 MILLIGRAM(S): 80 TABLET, FILM COATED ORAL at 21:15

## 2021-05-23 RX ADMIN — Medication 5 MILLIGRAM(S): at 07:39

## 2021-05-23 RX ADMIN — Medication 2: at 07:37

## 2021-05-23 RX ADMIN — INSULIN GLARGINE 25 UNIT(S): 100 INJECTION, SOLUTION SUBCUTANEOUS at 21:28

## 2021-05-23 RX ADMIN — Medication 1: at 17:04

## 2021-05-23 RX ADMIN — NAFCILLIN 200 GRAM(S): 10 INJECTION, POWDER, FOR SOLUTION INTRAVENOUS at 10:07

## 2021-05-23 RX ADMIN — NAFCILLIN 200 GRAM(S): 10 INJECTION, POWDER, FOR SOLUTION INTRAVENOUS at 14:29

## 2021-05-23 RX ADMIN — MORPHINE SULFATE 4 MILLIGRAM(S): 50 CAPSULE, EXTENDED RELEASE ORAL at 21:37

## 2021-05-23 RX ADMIN — MORPHINE SULFATE 4 MILLIGRAM(S): 50 CAPSULE, EXTENDED RELEASE ORAL at 21:20

## 2021-05-23 NOTE — PROGRESS NOTE ADULT - ASSESSMENT
A/P:-      1. Septic arthritis of Left knee with severe synovitis, s/p washout with recurrent effusion, s/p arthrocentesis 05/11 --sample compromised    2. 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 MSSA bacteremia   4. 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 Chronic iron deficiency anemia with component of ACD  6. DMII   7. Chronic HFrEF s/p AICD   8. CAD    - XR bilateral knees (04/26) large joint effusion in left knee  - s/p arthroscopic drainage of left knee on 04/26 and OR washout 4/26 - 20mL purulent fluid in left knee -  - Synovial Culture (04/26) MSSA   s/p  5/11 Arthrocentesis yielded 5 ml bloody fluid-0Specimen compromised though) arthro 5/20 GPC   - Blood Culture (04/26) x2, 04/27 NGTD, 04/28 x2 SJ, 04/30-05/01 NGTD, 05/02 and 05/03 received  - JOHN PAUL  - no endocarditis noted   - currently on nefcillin 2 q4 -->When cleared by ortho----plan to discharge on cefazolin 2 q8 as per ID for total of 6 weeks    - continue with pain control    MRI 5/21 attempted however he didnt go through it He defecated in bed while in MRI. He was given IV ativan by me still he didnt go through it.  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 Decreased area of right upper quadrant subcutaneous stranding at site of prior percutaneous cholecystostomy, without evidence of abscess.  - HIDA scan 05/02: cannot visualize GB  - as per advance [facility] Dr. Dixon outpatient ERCP and cholecystectomy to divert bile and heal fistula, recommended vac wound  - no further intervention as per IR and surgery   - hb level stable at this time - no active bleeding noted   - Last a1c 9.1 04/29  - off insulin pump, as he has lost it   - Endocrinology reconsulted, Basal bolus insulin ordered However dropped his BS 5/12 after receiving short acting insulin, now refusing even lower dose of short acting insulin   titrate lantus to 25 from 30 U at night     - EP on board: s/p interrogation of AICD 04/28 (normally functioning)  - Cardiology Dr Nelson is on board  - Continue Lasix PO 40mg PRN and aldactone 25mg QD   - Continue Digoxin 125mg QD  - Continue Aspirin 81mg/ Prasugrel 10mg, Rosuvastatin 40mg and toprol   Pain mgmt consult appreciated             DC  home?  pending Ortho clearance              1. Septic arthritis of Left knee with severe synovitis, s/p washout with recurrent effusion, s/p multiple  arthrocentesis   2. 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 MSSA bacteremia   4. 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 Chronic iron deficiency anemia with component of ACD  6. DMII   7. Chronic HFrEF s/p AICD   8. CAD      Plan    1- s/p arthroscopic drainage of left knee and washout 4/26 - 20mL purulent fluid in left knee -   Synovial Culture (04/26) MSSA   Post op, s/p multiple Arthrocentesis --> last 5/20 +  GPC   - JOHN PAUL  - no endocarditis noted   - Currently on nefcillin 2 q4 -->When cleared by ortho----plan to discharge on cefazolin 2 q8 as per ID for total of 6 weeks     He has been seen by pain management    refused to have MRI knee as rec by ortho Yesterday     2- As per [facility] Dr. Dixon outpatient ERCP and cholecystectomy to divert bile and heal fistula  - no further intervention as per IR and surgery     3- off insulin pump, as he has lost it   - Endocrinology reconsulted, Basal bolus insulin ordered However dropped his BS 5/12 after receiving short acting insulin  Currently on lantus     4- EP on board: s/p interrogation of AICD 04/28 (normally functioning)  - Cardiology Dr Nelson on case   - Continue Lasix PO 40mg PRN and aldactone 25mg QD   - Continue Digoxin 125mg QD  - Continue Aspirin 81mg/ Prasugrel 10mg, Rosuvastatin 40mg and toprol                 Handoff   Ortho recommended MRI,  5/21 attempted however he didnt go through it He defecated in bed while in MRI. He was given IV ativan by me in Radiology before MRI, but still he didnt go through it.  He wants to be fully sedated so he "does not  feel anything"    Pt is very non compliant unfortunately   He does not  even want to sit in chair    He has been seen by pain management     Please call pain management for pain control

## 2021-05-23 NOTE — PROGRESS NOTE ADULT - SUBJECTIVE AND OBJECTIVE BOX
Patient is a 65y old  Male who presents with a chief complaint of Septic arthritis (05 May 2021 07:23)      S  Got anxious in MRI.  Received IV ativan by me and RN   he had pooped while in MRI  Later patient told me he couldnt go through MRI " They need to completely sedate me so i dont feel anything"   L knee pain still there and not bending his knee much                  PHYSICAL EXAM:   NAD; Normocephalic;   LUNGS - no wheezing  HEART: S1 S2+   ABDOMEN: Soft, Nontender, non distended  EXTREMITIES: no cyanosis; no edema L knee dressed   NERVOUS SYSTEM:  Awake and alert; no focal neuro deficits appreciated                                            Lactate Trend            CAPILLARY BLOOD GLUCOSE      POCT Blood Glucose.: 126 mg/dL (22 May 2021 16:27)                                Lactate Trend            CAPILLARY BLOOD GLUCOSE      POCT Blood Glucose.: 126 mg/dL (22 May 2021 16:27)      MEDICATIONS  (STANDING):  ascorbic acid 500 milliGRAM(s) Oral daily  aspirin enteric coated 81 milliGRAM(s) Oral daily  atorvastatin 80 milliGRAM(s) Oral at bedtime  chlorhexidine 4% Liquid 1 Application(s) Topical <User Schedule>  dextrose 40% Gel 15 Gram(s) Oral once  dextrose 5%. 1000 milliLiter(s) (50 mL/Hr) IV Continuous <Continuous>  dextrose 5%. 1000 milliLiter(s) (100 mL/Hr) IV Continuous <Continuous>  dextrose 50% Injectable 25 Gram(s) IV Push once  dextrose 50% Injectable 12.5 Gram(s) IV Push once  dextrose 50% Injectable 25 Gram(s) IV Push once  digoxin     Tablet 125 MICROGram(s) Oral daily  DULoxetine 90 milliGRAM(s) Oral daily  enoxaparin Injectable 40 milliGRAM(s) SubCutaneous daily  gabapentin 300 milliGRAM(s) Oral three times a day  glucagon  Injectable 1 milliGRAM(s) IntraMuscular once  insulin glargine Injectable (LANTUS) 25 Unit(s) SubCutaneous at bedtime  insulin lispro (ADMELOG) corrective regimen sliding scale   SubCutaneous three times a day before meals  metoprolol succinate ER 25 milliGRAM(s) Oral daily  multivitamin/minerals 1 Tablet(s) Oral daily  nafcillin  IVPB 2 Gram(s) IV Intermittent every 4 hours  nafcillin  IVPB      nystatin Cream 1 Application(s) Topical two times a day  pantoprazole    Tablet 40 milliGRAM(s) Oral before breakfast  polyethylene glycol 3350 17 Gram(s) Oral two times a day  prasugrel 10 milliGRAM(s) Oral daily  senna 2 Tablet(s) Oral at bedtime  spironolactone 25 milliGRAM(s) Oral daily    MEDICATIONS  (PRN):  acetaminophen   Tablet .. 650 milliGRAM(s) Oral every 6 hours PRN Temp greater or equal to 38C (100.4F), Mild Pain (1 - 3)  baclofen 5 milliGRAM(s) Oral every 8 hours PRN Muscle Spasm  furosemide    Tablet 40 milliGRAM(s) Oral daily PRN fluid overload  melatonin 10 milliGRAM(s) Oral at bedtime PRN Insomnia  oxyCODONE    IR 5 milliGRAM(s) Oral every 4 hours PRN Moderate Pain (4 - 6)  oxyCODONE    IR 10 milliGRAM(s) Oral every 4 hours PRN Severe Pain (7 - 10)           Patient is a 65y old  Male who presents with a chief complaint of Septic arthritis (05 May 2021 07:23)      S  yesterday Got anxious in MRI. He Received IV ativan by me and RN for MRI in radiology still he didnt take the test       Later patient told me he couldnt go through MRI " They need to completely sedate me so i dont feel anything"     Seen this am  L knee pain still there and not bending his knee much as usual                  PHYSICAL EXAM:   NAD; Normocephalic;   LUNGS - no wheezing  HEART: S1 S2+   ABDOMEN: Soft, Nontender, non distended BS +  EXTREMITIES: no cyanosis; no edema L knee dressed is in a semiflexed position   NERVOUS SYSTEM:  Awake and alert; no focal neuro deficits appreciated                                            Lactate Trend            CAPILLARY BLOOD GLUCOSE      POCT Blood Glucose.: 126 mg/dL (22 May 2021 16:27)                                Lactate Trend            CAPILLARY BLOOD GLUCOSE      POCT Blood Glucose.: 126 mg/dL (22 May 2021 16:27)        MEDICATIONS  (STANDING):  ascorbic acid 500 milliGRAM(s) Oral daily  aspirin enteric coated 81 milliGRAM(s) Oral daily  atorvastatin 80 milliGRAM(s) Oral at bedtime  chlorhexidine 4% Liquid 1 Application(s) Topical <User Schedule>  dextrose 40% Gel 15 Gram(s) Oral once  dextrose 5%. 1000 milliLiter(s) (50 mL/Hr) IV Continuous <Continuous>  dextrose 5%. 1000 milliLiter(s) (100 mL/Hr) IV Continuous <Continuous>  dextrose 50% Injectable 25 Gram(s) IV Push once  dextrose 50% Injectable 12.5 Gram(s) IV Push once  dextrose 50% Injectable 25 Gram(s) IV Push once  digoxin     Tablet 125 MICROGram(s) Oral daily  DULoxetine 90 milliGRAM(s) Oral daily  enoxaparin Injectable 40 milliGRAM(s) SubCutaneous daily  gabapentin 300 milliGRAM(s) Oral three times a day  glucagon  Injectable 1 milliGRAM(s) IntraMuscular once  insulin glargine Injectable (LANTUS) 25 Unit(s) SubCutaneous at bedtime  insulin lispro (ADMELOG) corrective regimen sliding scale   SubCutaneous three times a day before meals  metoprolol succinate ER 25 milliGRAM(s) Oral daily  multivitamin/minerals 1 Tablet(s) Oral daily  nafcillin  IVPB 2 Gram(s) IV Intermittent every 4 hours  nafcillin  IVPB      nystatin Cream 1 Application(s) Topical two times a day  pantoprazole    Tablet 40 milliGRAM(s) Oral before breakfast  polyethylene glycol 3350 17 Gram(s) Oral two times a day  prasugrel 10 milliGRAM(s) Oral daily  senna 2 Tablet(s) Oral at bedtime  spironolactone 25 milliGRAM(s) Oral daily    MEDICATIONS  (PRN):  acetaminophen   Tablet .. 650 milliGRAM(s) Oral every 6 hours PRN Temp greater or equal to 38C (100.4F), Mild Pain (1 - 3)  baclofen 5 milliGRAM(s) Oral every 8 hours PRN Muscle Spasm  furosemide    Tablet 40 milliGRAM(s) Oral daily PRN fluid overload  melatonin 10 milliGRAM(s) Oral at bedtime PRN Insomnia  oxyCODONE    IR 5 milliGRAM(s) Oral every 4 hours PRN Moderate Pain (4 - 6)  oxyCODONE    IR 10 milliGRAM(s) Oral every 4 hours PRN Severe Pain (7 - 10)

## 2021-05-24 LAB
ALBUMIN SERPL ELPH-MCNC: 2.8 G/DL — LOW (ref 3.5–5.2)
ALP SERPL-CCNC: 90 U/L — SIGNIFICANT CHANGE UP (ref 30–115)
ALT FLD-CCNC: 11 U/L — SIGNIFICANT CHANGE UP (ref 0–41)
ANION GAP SERPL CALC-SCNC: 9 MMOL/L — SIGNIFICANT CHANGE UP (ref 7–14)
AST SERPL-CCNC: 14 U/L — SIGNIFICANT CHANGE UP (ref 0–41)
BILIRUB SERPL-MCNC: 0.5 MG/DL — SIGNIFICANT CHANGE UP (ref 0.2–1.2)
BUN SERPL-MCNC: 15 MG/DL — SIGNIFICANT CHANGE UP (ref 10–20)
CALCIUM SERPL-MCNC: 8.8 MG/DL — SIGNIFICANT CHANGE UP (ref 8.5–10.1)
CHLORIDE SERPL-SCNC: 100 MMOL/L — SIGNIFICANT CHANGE UP (ref 98–110)
CO2 SERPL-SCNC: 28 MMOL/L — SIGNIFICANT CHANGE UP (ref 17–32)
CREAT SERPL-MCNC: 0.8 MG/DL — SIGNIFICANT CHANGE UP (ref 0.7–1.5)
GLUCOSE BLDC GLUCOMTR-MCNC: 125 MG/DL — HIGH (ref 70–99)
GLUCOSE BLDC GLUCOMTR-MCNC: 143 MG/DL — HIGH (ref 70–99)
GLUCOSE BLDC GLUCOMTR-MCNC: 78 MG/DL — SIGNIFICANT CHANGE UP (ref 70–99)
GLUCOSE BLDC GLUCOMTR-MCNC: 97 MG/DL — SIGNIFICANT CHANGE UP (ref 70–99)
GLUCOSE SERPL-MCNC: 97 MG/DL — SIGNIFICANT CHANGE UP (ref 70–99)
HCT VFR BLD CALC: 29.3 % — LOW (ref 42–52)
HGB BLD-MCNC: 8.6 G/DL — LOW (ref 14–18)
MCHC RBC-ENTMCNC: 21.2 PG — LOW (ref 27–31)
MCHC RBC-ENTMCNC: 29.4 G/DL — LOW (ref 32–37)
MCV RBC AUTO: 72.3 FL — LOW (ref 80–94)
NRBC # BLD: 0 /100 WBCS — SIGNIFICANT CHANGE UP (ref 0–0)
PLATELET # BLD AUTO: 507 K/UL — HIGH (ref 130–400)
POTASSIUM SERPL-MCNC: 4.8 MMOL/L — SIGNIFICANT CHANGE UP (ref 3.5–5)
POTASSIUM SERPL-SCNC: 4.8 MMOL/L — SIGNIFICANT CHANGE UP (ref 3.5–5)
PROT SERPL-MCNC: 6.9 G/DL — SIGNIFICANT CHANGE UP (ref 6–8)
RBC # BLD: 4.05 M/UL — LOW (ref 4.7–6.1)
RBC # FLD: 23.4 % — HIGH (ref 11.5–14.5)
SODIUM SERPL-SCNC: 137 MMOL/L — SIGNIFICANT CHANGE UP (ref 135–146)
WBC # BLD: 8.21 K/UL — SIGNIFICANT CHANGE UP (ref 4.8–10.8)
WBC # FLD AUTO: 8.21 K/UL — SIGNIFICANT CHANGE UP (ref 4.8–10.8)

## 2021-05-24 PROCEDURE — 99232 SBSQ HOSP IP/OBS MODERATE 35: CPT

## 2021-05-24 RX ADMIN — NAFCILLIN 200 GRAM(S): 10 INJECTION, POWDER, FOR SOLUTION INTRAVENOUS at 10:00

## 2021-05-24 RX ADMIN — Medication 10 MILLIGRAM(S): at 01:31

## 2021-05-24 RX ADMIN — NAFCILLIN 200 GRAM(S): 10 INJECTION, POWDER, FOR SOLUTION INTRAVENOUS at 02:24

## 2021-05-24 RX ADMIN — MORPHINE SULFATE 4 MILLIGRAM(S): 50 CAPSULE, EXTENDED RELEASE ORAL at 23:00

## 2021-05-24 RX ADMIN — DULOXETINE HYDROCHLORIDE 90 MILLIGRAM(S): 30 CAPSULE, DELAYED RELEASE ORAL at 12:21

## 2021-05-24 RX ADMIN — SENNA PLUS 2 TABLET(S): 8.6 TABLET ORAL at 21:20

## 2021-05-24 RX ADMIN — MORPHINE SULFATE 4 MILLIGRAM(S): 50 CAPSULE, EXTENDED RELEASE ORAL at 14:03

## 2021-05-24 RX ADMIN — PANTOPRAZOLE SODIUM 40 MILLIGRAM(S): 20 TABLET, DELAYED RELEASE ORAL at 05:49

## 2021-05-24 RX ADMIN — INSULIN GLARGINE 25 UNIT(S): 100 INJECTION, SOLUTION SUBCUTANEOUS at 21:20

## 2021-05-24 RX ADMIN — PRASUGREL 10 MILLIGRAM(S): 5 TABLET, FILM COATED ORAL at 12:21

## 2021-05-24 RX ADMIN — NAFCILLIN 200 GRAM(S): 10 INJECTION, POWDER, FOR SOLUTION INTRAVENOUS at 13:41

## 2021-05-24 RX ADMIN — GABAPENTIN 300 MILLIGRAM(S): 400 CAPSULE ORAL at 05:49

## 2021-05-24 RX ADMIN — Medication 81 MILLIGRAM(S): at 12:20

## 2021-05-24 RX ADMIN — Medication 25 MILLIGRAM(S): at 05:49

## 2021-05-24 RX ADMIN — Medication 1 TABLET(S): at 12:21

## 2021-05-24 RX ADMIN — NAFCILLIN 200 GRAM(S): 10 INJECTION, POWDER, FOR SOLUTION INTRAVENOUS at 05:50

## 2021-05-24 RX ADMIN — Medication 500 MILLIGRAM(S): at 12:20

## 2021-05-24 RX ADMIN — ATORVASTATIN CALCIUM 80 MILLIGRAM(S): 80 TABLET, FILM COATED ORAL at 21:20

## 2021-05-24 RX ADMIN — MORPHINE SULFATE 4 MILLIGRAM(S): 50 CAPSULE, EXTENDED RELEASE ORAL at 22:25

## 2021-05-24 RX ADMIN — NAFCILLIN 200 GRAM(S): 10 INJECTION, POWDER, FOR SOLUTION INTRAVENOUS at 21:21

## 2021-05-24 RX ADMIN — MORPHINE SULFATE 4 MILLIGRAM(S): 50 CAPSULE, EXTENDED RELEASE ORAL at 05:53

## 2021-05-24 RX ADMIN — MORPHINE SULFATE 4 MILLIGRAM(S): 50 CAPSULE, EXTENDED RELEASE ORAL at 18:17

## 2021-05-24 RX ADMIN — MORPHINE SULFATE 4 MILLIGRAM(S): 50 CAPSULE, EXTENDED RELEASE ORAL at 01:31

## 2021-05-24 RX ADMIN — Medication 0: at 12:22

## 2021-05-24 RX ADMIN — SPIRONOLACTONE 25 MILLIGRAM(S): 25 TABLET, FILM COATED ORAL at 05:49

## 2021-05-24 RX ADMIN — GABAPENTIN 300 MILLIGRAM(S): 400 CAPSULE ORAL at 21:20

## 2021-05-24 RX ADMIN — Medication 125 MICROGRAM(S): at 05:49

## 2021-05-24 RX ADMIN — ENOXAPARIN SODIUM 40 MILLIGRAM(S): 100 INJECTION SUBCUTANEOUS at 12:22

## 2021-05-24 RX ADMIN — GABAPENTIN 300 MILLIGRAM(S): 400 CAPSULE ORAL at 13:42

## 2021-05-24 RX ADMIN — MORPHINE SULFATE 4 MILLIGRAM(S): 50 CAPSULE, EXTENDED RELEASE ORAL at 02:02

## 2021-05-24 RX ADMIN — MORPHINE SULFATE 4 MILLIGRAM(S): 50 CAPSULE, EXTENDED RELEASE ORAL at 06:19

## 2021-05-24 RX ADMIN — NYSTATIN CREAM 1 APPLICATION(S): 100000 CREAM TOPICAL at 05:50

## 2021-05-24 RX ADMIN — NAFCILLIN 200 GRAM(S): 10 INJECTION, POWDER, FOR SOLUTION INTRAVENOUS at 18:17

## 2021-05-24 RX ADMIN — MORPHINE SULFATE 4 MILLIGRAM(S): 50 CAPSULE, EXTENDED RELEASE ORAL at 13:42

## 2021-05-24 NOTE — CONSULT NOTE ADULT - ASSESSMENT
65 year old male with PMH of CAD s/p MI, PCI/CABG, CHFrEF (15-20%), has ICD, HTN, celiac disease, DM, neuropathy, chronic b/l LE ulcers presents, severe chronic pain,  hx infected R TKR with MRSA (was eventually cemented so has limited ROM R knee), and history of cholecystostomy tube placement in February 2020 for acute cholecystitis s/p removal in May 2020 with multiple presentations including persistent bilious drainage from the prior tract site as well as a RUQ abdominal abscess at the site s/p drainage. He presented to ED with inability to walk/ambulate 2/2 left knee pain. Pt has hx of knee pain and infections. 2 weeks ago pt received a 'steroid' shot for his left knee for pain. 2 days later, his knee began to swell and he was unable to walk or bend the knee. It progressively gotten worse to the point where the pain was unbearable so he presented to the ED.     Pending knee MRI under anesthesia tomorrow.     Chronic opioid use with daily requirement 60-90 MME on Istop.  Last 24hr MAR reviewed:  67 MME (Morphine 4mg x 5, Oxycodone 5mg x 1).    Recommendations  - While NPO overnight, continue IV morphine 2/4/6mg q6 hr regimen  - Once resumed diet, may consider the following:     - Increase gabapentin to 600mg TID standing     - Start baclofen 5mg TID standing     - Start tylenol 975mg four times daily standing     - Start oxycodone 20mg ER every 12 hours PRN and Start oxycodone 10mg IR every 4 hours PRN. This regimen is 113 MME     - Start IV Dilaudid 0.1mg every 6 hours for breakthrough pain     - Continue duloxetine 90 mg daily  - Pain service will follow    65 year old male with PMH of CAD s/p MI, PCI/CABG, CHFrEF (15-20%), has ICD, HTN, celiac disease, DM, neuropathy, chronic b/l LE ulcers presents, severe chronic pain,  hx infected R TKR with MRSA (was eventually cemented so has limited ROM R knee), and history of cholecystostomy tube placement in February 2020 for acute cholecystitis s/p removal in May 2020 with multiple presentations including persistent bilious drainage from the prior tract site as well as a RUQ abdominal abscess at the site s/p drainage. He presented to ED with inability to walk/ambulate 2/2 left knee pain. Pt has hx of knee pain and infections. 2 weeks ago pt received a 'steroid' shot for his left knee for pain. 2 days later, his knee began to swell and he was unable to walk or bend the knee. It progressively gotten worse to the point where the pain was unbearable so he presented to the ED.     Pending knee MRI under anesthesia tomorrow.     Chronic opioid use with daily requirement 60-90 MME on Istop.  Last 24hr MAR reviewed:  67 MME (Morphine 4mg x 5, Oxycodone 5mg x 1).    Recommendations  - While NPO overnight, continue IV morphine 2/4/6mg q6 hr regimen  - Once resumed diet, may consider the following:     - Increase gabapentin to 600mg TID standing     - Start baclofen 5mg TID standing     - Start tylenol 975mg four times daily standing     - Start oxycodone 15mg ER every 12 hours PRN and Start oxycodone 10mg IR every 4 hours PRN. This regimen is 135 MME, roughly 30% increase from home dose     - Start IV Dilaudid 0.1mg every 6 hours PRN for breakthrough pain     - Continue duloxetine 90 mg daily  - Pain service will follow

## 2021-05-24 NOTE — PROGRESS NOTE ADULT - SUBJECTIVE AND OBJECTIVE BOX
Sub: Patient said he was able to tolerate MRI previously but would like to have total sedation for the up coming one  since he did not fell good the last time. He endorsed having some drainage from the RUQ surgical site.     OBJ:   Vital Signs Last 24 Hrs  T(C): 36.9 (24 May 2021 21:00), Max: 36.9 (24 May 2021 21:00)  T(F): 98.4 (24 May 2021 21:00), Max: 98.4 (24 May 2021 21:00)  HR: 80 (24 May 2021 21:00) (70 - 80)  BP: 119/66 (24 May 2021 21:00) (113/63 - 135/72)  BP(mean): --  RR: 18 (24 May 2021 21:00) (18 - 18)  SpO2: --    LABS:                        8.6    8.21  )-----------( 507      ( 24 May 2021 11:47 )             29.3     05-24    137  |  100  |  15  ----------------------------<  97  4.8   |  28  |  0.8    Ca    8.8      24 May 2021 11:47    TPro  6.9  /  Alb  2.8<L>  /  TBili  0.5  /  DBili  x   /  AST  14  /  ALT  11  /  AlkPhos  90  05-24    PE:  Const: NAD; AAOx3  LUNGS : CTAB  HEART: S1 S2+ . No m/r/g   ABDOMEN: Soft, Nontender, non distended BS +  EXTREMITIES: Right knee with surgical scar. Left knee warm, tender and edematous. Left anterior tibial surgical scar. Bilateral cold feet but no cyanosis  NERVOUS SYSTEM:  Awake and alert; no focal neuro deficits appreciated  Skin: Copious drainage from the GB site (RUQ)

## 2021-05-24 NOTE — CONSULT NOTE ADULT - SUBJECTIVE AND OBJECTIVE BOX
HPI:  65 year old male with PMH of CAD s/p MI, PCI/CABG, CHFrEF (15-20%), has ICD, HTN, celiac disease, DM, neuropathy, chronic b/l LE ulcers presents, severe chronic pain,  hx infected R TKR with MRSA (was eventually cemented so has limited ROM R knee), and history of cholecystostomy tube placement in February 2020 for acute cholecystitis s/p removal in May 2020 with multiple presentations including persistent bilious drainage from the prior tract site as well as a RUQ abdominal abscess at the site s/p drainage. He presented to ED with inability to walk/ambulate 2/2 left knee pain. Pt has hx of knee pain and infections. 2 weeks ago pt received a 'steroid' shot for his left knee for pain. 2 days later, his knee began to swell and he was unable to walk or bend the knee. It progressively gotten worse to the point where the pain was unbearable so he presented to the ED.     Pt endorses a weight loss >70 lbs over the last year, there is bilious drainage coming from where he had a prior per sudhir, jaundice in the finger tips and sclera. Recent admission for uncontrollable movements, no diagnosis given. Pt denies f/c/n/v, chest pain, headaches, UTI symptoms     In the ED:  Joint was aspirated cloudy yellow fluid was seen and sent for cultures. Neutrophil count >50,000 and RBC >8000. Ortho consulted for septic arthritis, s/p arthroscopic drainage. Surg consulted for bile drainage, HIDA scan, consult GI, possible ERCP. CT A&P, No evidence of acute intra-abdominal pathology. Decreased area of right upper quadrant subcutaneous stranding at site of prior percutaneous cholecystostomy, without evidence of abscess. Vitally stable. Admitted to medicine for medical management.    The patient is being followed by Neurology, and he is followed by Cardiology Dr. Lopez in  and Dr. Lilia Caldera in [facility], also by Endo Dr. Cormier at [facility].     (26 Apr 2021 11:34)    The patient was seen and examined at the bedside. Pain controlled on IV morphine but not spasms and radiating pain in both feet and leg. Last 24hr MAR reviewed:, Morphine 4mg x 5, Oxycodone 5mg x 1 67 MME. Istop reviewed ([id number]): chronic opioid use between 60-90 MME at least since 10/2020.    Allergies    ACE inhibitors (Hives)  carvedilol (Other)  enalapril (Hives)  Entresto (Other)    Intolerances    PAST MEDICAL & SURGICAL HISTORY:  Status post percutaneous transluminal coronary angioplasty  2 stents    Atherosclerosis of coronary artery  CAD (coronary artery disease)    Osteoarthritis    HLD (hyperlipidemia)    Blood clot due to device, implant, or graft  was on blood thinners    Diabetes  on insulin pump    HTN (hypertension)    CHF (congestive heart failure)  EF ~ 25%    History of celiac disease    Diverticulitis    STEMI (ST elevation myocardial infarction)    Diabetic neuropathy    Anxiety and depression    Other postprocedural status  Fixation hardware in foot LEFT    Stented coronary artery  10/18 heart attack  INFERIOR WALL MI    S/P CABG x 1  2018    S/P TKR (total knee replacement), right  with infection Mrsa   per pt he was cleared from MRSA infection    Surgery, elective  right knee wound debridement    History of open reduction and internal fixation (ORIF) procedure    H/O shoulder surgery  right    AICD (automatic cardioverter/defibrillator) present    Cholecystostomy care  drain inserted 2020 &amp; removed 4 months ago    History of tonsillectomy    History of hip replacement, total, right    Home Medications:  ALPRAZolam 0.5 mg oral tablet: 1 tab(s) orally every 8 hours (26 Apr 2021 12:11)  aspirin 81 mg oral delayed release tablet: 1 tab(s) orally once a day (26 Apr 2021 12:11)  digoxin 125 mcg (0.125 mg) oral tablet: 1 tab(s) orally once a day (26 Apr 2021 12:11)  DULoxetine: 90 milligram(s) orally once a day (26 Apr 2021 12:11)  insulin: pump; HUMULIN (26 Apr 2021 12:11)  Jardiance 10 mg oral tablet: 1 tab(s) orally once a day (in the morning) (26 Apr 2021 12:11)  melatonin 10 mg oral tablet: 1 tab(s) orally once a day (at bedtime), As needed, Insomnia (26 Apr 2021 12:11)  metoprolol succinate 25 mg oral tablet, extended release: 1 tab(s) orally 2 times a day (26 Apr 2021 12:11)  morphine 15 mg oral tablet: 1 tab(s) orally every 4 hours, As Needed - for severe pain (7 to 10 out of 10) (26 Apr 2021 12:11)  prasugrel 10 mg oral tablet: 1 tab(s) orally once a day (26 Apr 2021 12:11)  spironolactone 25 mg oral tablet: 1 tab(s) orally once a day (26 Apr 2021 12:11)    MEDICATIONS  (STANDING):  ascorbic acid 500 milliGRAM(s) Oral daily  aspirin enteric coated 81 milliGRAM(s) Oral daily  atorvastatin 80 milliGRAM(s) Oral at bedtime  chlorhexidine 4% Liquid 1 Application(s) Topical <User Schedule>  dextrose 40% Gel 15 Gram(s) Oral once  dextrose 5%. 1000 milliLiter(s) (100 mL/Hr) IV Continuous <Continuous>  dextrose 5%. 1000 milliLiter(s) (50 mL/Hr) IV Continuous <Continuous>  dextrose 50% Injectable 25 Gram(s) IV Push once  dextrose 50% Injectable 12.5 Gram(s) IV Push once  dextrose 50% Injectable 25 Gram(s) IV Push once  digoxin     Tablet 125 MICROGram(s) Oral daily  DULoxetine 90 milliGRAM(s) Oral daily  enoxaparin Injectable 40 milliGRAM(s) SubCutaneous daily  gabapentin 300 milliGRAM(s) Oral three times a day  glucagon  Injectable 1 milliGRAM(s) IntraMuscular once  insulin glargine Injectable (LANTUS) 25 Unit(s) SubCutaneous at bedtime  insulin lispro (ADMELOG) corrective regimen sliding scale   SubCutaneous three times a day before meals  metoprolol succinate ER 25 milliGRAM(s) Oral daily  multivitamin/minerals 1 Tablet(s) Oral daily  nafcillin  IVPB 2 Gram(s) IV Intermittent every 4 hours  nafcillin  IVPB      nystatin Cream 1 Application(s) Topical two times a day  pantoprazole    Tablet 40 milliGRAM(s) Oral before breakfast  polyethylene glycol 3350 17 Gram(s) Oral two times a day  prasugrel 10 milliGRAM(s) Oral daily  senna 2 Tablet(s) Oral at bedtime  spironolactone 25 milliGRAM(s) Oral daily    MEDICATIONS  (PRN):  acetaminophen   Tablet .. 650 milliGRAM(s) Oral every 6 hours PRN Temp greater or equal to 38C (100.4F), Mild Pain (1 - 3)  baclofen 5 milliGRAM(s) Oral every 8 hours PRN Muscle Spasm  furosemide    Tablet 40 milliGRAM(s) Oral daily PRN fluid overload  melatonin 10 milliGRAM(s) Oral at bedtime PRN Insomnia  morphine  - Injectable 4 milliGRAM(s) IV Push every 4 hours PRN Severe Pain (7 - 10)  oxyCODONE    IR 5 milliGRAM(s) Oral every 4 hours PRN Moderate Pain (4 - 6)  oxyCODONE    IR 10 milliGRAM(s) Oral every 4 hours PRN Severe Pain (7 - 10)                          8.6    8.21  )-----------( 507      ( 24 May 2021 11:47 )             29.3     05-24    137  |  100  |  15  ----------------------------<  97  4.8   |  28  |  0.8    Ca    8.8      24 May 2021 11:47    TPro  6.9  /  Alb  2.8<L>  /  TBili  0.5  /  DBili  x   /  AST  14  /  ALT  11  /  AlkPhos  90  05-24    ICU Vital Signs Last 24 Hrs  T(C): 35.6 (24 May 2021 12:00), Max: 36.7 (24 May 2021 05:40)  T(F): 96 (24 May 2021 12:00), Max: 98 (24 May 2021 05:40)  HR: 70 (24 May 2021 12:00) (70 - 80)  BP: 113/63 (24 May 2021 12:00) (113/63 - 135/72)  BP(mean): --  ABP: --  ABP(mean): --  RR: 18 (24 May 2021 12:00) (18 - 18)  SpO2: --    NAD, EMOI  bilateral anterior leg eschar dry/non draining. RIGHT leg dressing C/D/I  Limited bilateral knee ROM  sensation to light touch grossly intact in bilateral LE

## 2021-05-24 NOTE — PROGRESS NOTE ADULT - ASSESSMENT
A/P:    --Left septic knee with synovitis:  Synovial Culture (04/26) MSSA.  Post op, s/p multiple Arthrocentesis --> last 5/20 +  GPC.   JOHN PAUL  -with no endocarditis.  MRI left knee re-ordered but with total sedation per patient's request. On Nafcillin and will continue. -plan is to dc him on cefazolin 2 q8 as per ID for total of 6 weeks. Reconsulted pain management and they recommended IV morphine 2/4/6mg q6 hr regimen while npo. Once diet is resumed, may consider the following:  Increasing gabapentin to 600mg TID standing. To start baclofen 5mg TID standing, tylenol 975mg four times daily standing, oxycodone 15mg ER every 12 hours PRN and oxycodone 10mg IR every 4 hours PRN. IV Dilaudid 0.1mg every 6 hours PRN for breakthrough pain. To continue duloxetine 90 mg daily . pain and ID team well appreciated  --MSSA Bacteremia: On Nafcillin.   --Biliary drainage. Obtained and sent for cx today. Per previous note,  GI wants outpatient ERCP and cholecystectomy to divert bile and heal fistula but patient wants surgery reconsulted as he would to have the surgery inpatient . Surgery reconsulted for possible cholecystectomy as patient requested for   --DM2: Continue current regimen  --Anemia: Iro  def combined with ACD  --CAD: ASA, statin, toprol and Prasugrel  --CHF with reduced EF: Aldactone and Lasix  --Covid vaccine status: He has received one dose and is supposed to receive the second dose on 5/15/21 but he was already hospitalized. ID on board and will appreciate their input on that.    --DVT ppx:  --Dispo: Pending MRI result                         A/P:  64 yo male admitted with left septic knee    --Left septic knee with synovitis:  Synovial Culture (04/26) MSSA.  Post op, s/p multiple Arthrocentesis --> last 5/20 +  GPC.   JOHN PAUL  -with no endocarditis.  MRI left knee re-ordered but with total sedation per patient's request. On Nafcillin and will continue. -plan is to dc him on cefazolin 2 q8 as per ID for total of 6 weeks. Reconsulted pain management and they recommended IV morphine 2/4/6mg q6 hr regimen while npo. Once diet is resumed, may consider the following:  Increasing gabapentin to 600mg TID standing. To start baclofen 5mg TID standing, tylenol 975mg four times daily standing, oxycodone 15mg ER every 12 hours PRN and oxycodone 10mg IR every 4 hours PRN. IV Dilaudid 0.1mg every 6 hours PRN for breakthrough pain. To continue duloxetine 90 mg daily . pain and ID team well appreciated  --MSSA Bacteremia: On Nafcillin.   --Biliary drainage. Obtained and sent for cx today. Per previous note,  GI wants outpatient ERCP and cholecystectomy to divert bile and heal fistula but patient wants surgery reconsulted as he would to have the surgery inpatient . Surgery reconsulted for possible cholecystectomy as patient requested for   --DM2: Continue current regimen  --Anemia: Iro  def combined with ACD  --CAD: ASA, statin, toprol and Prasugrel  --CHF with reduced EF: Aldactone and Lasix  --Covid vaccine status: He has received one dose and is supposed to receive the second dose on 5/15/21 but he was already hospitalized. ID on board and will appreciate their input on that.    --DVT ppx: Lovenox, prasugrel and ASA  --Dispo: Pending MRI result

## 2021-05-24 NOTE — CONSULT NOTE ADULT - TIME BILLING
Coordination of care  Patient education
Direct patient care, medication reconciliation and coordination
I have personally seen and examined this patient.    I have reviewed all pertinent clinical information and reviewed all relevant imaging and diagnostic studies personally.   I counseled the patient about diagnostic testing and treatment plan. All questions were answered.   I discussed recommendations with the primary team.
adjusting insulin pump   discussed with medical resident

## 2021-05-25 LAB
ALBUMIN SERPL ELPH-MCNC: 2.6 G/DL — LOW (ref 3.5–5.2)
ALP SERPL-CCNC: 86 U/L — SIGNIFICANT CHANGE UP (ref 30–115)
ALT FLD-CCNC: 11 U/L — SIGNIFICANT CHANGE UP (ref 0–41)
ANION GAP SERPL CALC-SCNC: 11 MMOL/L — SIGNIFICANT CHANGE UP (ref 7–14)
AST SERPL-CCNC: 18 U/L — SIGNIFICANT CHANGE UP (ref 0–41)
BILIRUB SERPL-MCNC: 0.6 MG/DL — SIGNIFICANT CHANGE UP (ref 0.2–1.2)
BUN SERPL-MCNC: 17 MG/DL — SIGNIFICANT CHANGE UP (ref 10–20)
CALCIUM SERPL-MCNC: 8.1 MG/DL — LOW (ref 8.5–10.1)
CHLORIDE SERPL-SCNC: 98 MMOL/L — SIGNIFICANT CHANGE UP (ref 98–110)
CO2 SERPL-SCNC: 24 MMOL/L — SIGNIFICANT CHANGE UP (ref 17–32)
CREAT SERPL-MCNC: 0.9 MG/DL — SIGNIFICANT CHANGE UP (ref 0.7–1.5)
CULTURE RESULTS: SIGNIFICANT CHANGE UP
CULTURE RESULTS: SIGNIFICANT CHANGE UP
GLUCOSE BLDC GLUCOMTR-MCNC: 109 MG/DL — HIGH (ref 70–99)
GLUCOSE BLDC GLUCOMTR-MCNC: 67 MG/DL — LOW (ref 70–99)
GLUCOSE BLDC GLUCOMTR-MCNC: 86 MG/DL — SIGNIFICANT CHANGE UP (ref 70–99)
GLUCOSE SERPL-MCNC: 73 MG/DL — SIGNIFICANT CHANGE UP (ref 70–99)
HCT VFR BLD CALC: 28.7 % — LOW (ref 42–52)
HGB BLD-MCNC: 8.5 G/DL — LOW (ref 14–18)
MCHC RBC-ENTMCNC: 21.4 PG — LOW (ref 27–31)
MCHC RBC-ENTMCNC: 29.6 G/DL — LOW (ref 32–37)
MCV RBC AUTO: 72.1 FL — LOW (ref 80–94)
NRBC # BLD: 0 /100 WBCS — SIGNIFICANT CHANGE UP (ref 0–0)
PLATELET # BLD AUTO: 454 K/UL — HIGH (ref 130–400)
POTASSIUM SERPL-MCNC: 5.1 MMOL/L — HIGH (ref 3.5–5)
POTASSIUM SERPL-SCNC: 5.1 MMOL/L — HIGH (ref 3.5–5)
PROT SERPL-MCNC: 6.3 G/DL — SIGNIFICANT CHANGE UP (ref 6–8)
RBC # BLD: 3.98 M/UL — LOW (ref 4.7–6.1)
RBC # FLD: 23.5 % — HIGH (ref 11.5–14.5)
SARS-COV-2 RNA SPEC QL NAA+PROBE: SIGNIFICANT CHANGE UP
SODIUM SERPL-SCNC: 133 MMOL/L — LOW (ref 135–146)
SPECIMEN SOURCE: SIGNIFICANT CHANGE UP
WBC # BLD: 8.45 K/UL — SIGNIFICANT CHANGE UP (ref 4.8–10.8)
WBC # FLD AUTO: 8.45 K/UL — SIGNIFICANT CHANGE UP (ref 4.8–10.8)

## 2021-05-25 PROCEDURE — 73723 MRI JOINT LWR EXTR W/O&W/DYE: CPT | Mod: 26,LT

## 2021-05-25 PROCEDURE — 99232 SBSQ HOSP IP/OBS MODERATE 35: CPT

## 2021-05-25 RX ORDER — SODIUM ZIRCONIUM CYCLOSILICATE 10 G/10G
5 POWDER, FOR SUSPENSION ORAL ONCE
Refills: 0 | Status: COMPLETED | OUTPATIENT
Start: 2021-05-25 | End: 2021-05-25

## 2021-05-25 RX ADMIN — NAFCILLIN 200 GRAM(S): 10 INJECTION, POWDER, FOR SOLUTION INTRAVENOUS at 12:49

## 2021-05-25 RX ADMIN — MORPHINE SULFATE 4 MILLIGRAM(S): 50 CAPSULE, EXTENDED RELEASE ORAL at 02:55

## 2021-05-25 RX ADMIN — MORPHINE SULFATE 4 MILLIGRAM(S): 50 CAPSULE, EXTENDED RELEASE ORAL at 22:07

## 2021-05-25 RX ADMIN — GABAPENTIN 300 MILLIGRAM(S): 400 CAPSULE ORAL at 05:52

## 2021-05-25 RX ADMIN — MORPHINE SULFATE 4 MILLIGRAM(S): 50 CAPSULE, EXTENDED RELEASE ORAL at 12:58

## 2021-05-25 RX ADMIN — Medication 10 MILLIGRAM(S): at 22:07

## 2021-05-25 RX ADMIN — NAFCILLIN 200 GRAM(S): 10 INJECTION, POWDER, FOR SOLUTION INTRAVENOUS at 22:04

## 2021-05-25 RX ADMIN — GABAPENTIN 300 MILLIGRAM(S): 400 CAPSULE ORAL at 12:48

## 2021-05-25 RX ADMIN — GABAPENTIN 300 MILLIGRAM(S): 400 CAPSULE ORAL at 22:04

## 2021-05-25 RX ADMIN — INSULIN GLARGINE 25 UNIT(S): 100 INJECTION, SOLUTION SUBCUTANEOUS at 22:04

## 2021-05-25 RX ADMIN — DULOXETINE HYDROCHLORIDE 90 MILLIGRAM(S): 30 CAPSULE, DELAYED RELEASE ORAL at 12:48

## 2021-05-25 RX ADMIN — NAFCILLIN 200 GRAM(S): 10 INJECTION, POWDER, FOR SOLUTION INTRAVENOUS at 02:24

## 2021-05-25 RX ADMIN — ENOXAPARIN SODIUM 40 MILLIGRAM(S): 100 INJECTION SUBCUTANEOUS at 12:49

## 2021-05-25 RX ADMIN — MORPHINE SULFATE 4 MILLIGRAM(S): 50 CAPSULE, EXTENDED RELEASE ORAL at 17:49

## 2021-05-25 RX ADMIN — SODIUM ZIRCONIUM CYCLOSILICATE 5 GRAM(S): 10 POWDER, FOR SUSPENSION ORAL at 22:04

## 2021-05-25 RX ADMIN — NAFCILLIN 200 GRAM(S): 10 INJECTION, POWDER, FOR SOLUTION INTRAVENOUS at 05:52

## 2021-05-25 RX ADMIN — Medication 500 MILLIGRAM(S): at 12:48

## 2021-05-25 RX ADMIN — PANTOPRAZOLE SODIUM 40 MILLIGRAM(S): 20 TABLET, DELAYED RELEASE ORAL at 06:45

## 2021-05-25 RX ADMIN — MORPHINE SULFATE 4 MILLIGRAM(S): 50 CAPSULE, EXTENDED RELEASE ORAL at 06:45

## 2021-05-25 RX ADMIN — MORPHINE SULFATE 4 MILLIGRAM(S): 50 CAPSULE, EXTENDED RELEASE ORAL at 12:28

## 2021-05-25 RX ADMIN — ATORVASTATIN CALCIUM 80 MILLIGRAM(S): 80 TABLET, FILM COATED ORAL at 22:05

## 2021-05-25 RX ADMIN — Medication 25 MILLIGRAM(S): at 05:52

## 2021-05-25 RX ADMIN — Medication 1 TABLET(S): at 12:48

## 2021-05-25 RX ADMIN — Medication 125 MICROGRAM(S): at 05:52

## 2021-05-25 RX ADMIN — Medication 5 MILLIGRAM(S): at 02:33

## 2021-05-25 RX ADMIN — MORPHINE SULFATE 4 MILLIGRAM(S): 50 CAPSULE, EXTENDED RELEASE ORAL at 17:19

## 2021-05-25 RX ADMIN — NYSTATIN CREAM 1 APPLICATION(S): 100000 CREAM TOPICAL at 17:19

## 2021-05-25 RX ADMIN — OXYCODONE HYDROCHLORIDE 10 MILLIGRAM(S): 5 TABLET ORAL at 08:52

## 2021-05-25 RX ADMIN — OXYCODONE HYDROCHLORIDE 10 MILLIGRAM(S): 5 TABLET ORAL at 09:22

## 2021-05-25 RX ADMIN — MORPHINE SULFATE 4 MILLIGRAM(S): 50 CAPSULE, EXTENDED RELEASE ORAL at 02:23

## 2021-05-25 RX ADMIN — NYSTATIN CREAM 1 APPLICATION(S): 100000 CREAM TOPICAL at 05:52

## 2021-05-25 RX ADMIN — SPIRONOLACTONE 25 MILLIGRAM(S): 25 TABLET, FILM COATED ORAL at 05:52

## 2021-05-25 RX ADMIN — MORPHINE SULFATE 4 MILLIGRAM(S): 50 CAPSULE, EXTENDED RELEASE ORAL at 22:30

## 2021-05-25 RX ADMIN — NAFCILLIN 200 GRAM(S): 10 INJECTION, POWDER, FOR SOLUTION INTRAVENOUS at 17:19

## 2021-05-25 RX ADMIN — PRASUGREL 10 MILLIGRAM(S): 5 TABLET, FILM COATED ORAL at 12:48

## 2021-05-25 RX ADMIN — Medication 81 MILLIGRAM(S): at 12:48

## 2021-05-25 NOTE — PROGRESS NOTE ADULT - SUBJECTIVE AND OBJECTIVE BOX
Sub: Patient was in MRI this morning thus was not seen because he was in MRI    OBJ:     Vital Signs Last 24 Hrs  T(C): 35.7 (25 May 2021 13:31), Max: 36.9 (24 May 2021 21:00)  T(F): 96.2 (25 May 2021 13:31), Max: 98.4 (24 May 2021 21:00)  HR: 82 (25 May 2021 13:31) (74 - 82)  BP: 130/60 (25 May 2021 13:31) (117/62 - 130/60)  BP(mean): --  RR: 18 (25 May 2021 13:31) (18 - 18)  SpO2: --      LABS:                                   8.5    8.45  )-----------( 454      ( 25 May 2021 06:38 )             28.7   05-25    133<L>  |  98  |  17  ----------------------------<  73  5.1<H>   |  24  |  0.9    Ca    8.1<L>      25 May 2021 06:38    TPro  6.3  /  Alb  2.6<L>  /  TBili  0.6  /  DBili  x   /  AST  18  /  ALT  11  /  AlkPhos  86  05-25      PE:  Not done today since patient was in MRI at the time

## 2021-05-25 NOTE — CHART NOTE - NSCHARTNOTEFT_GEN_A_CORE
PACU ANESTHESIA ADMISSION NOTE      Procedure: Arthroscopic drainage of knee      Post op diagnosis:  Septic arthritis of knee, left        ____  Intubated  TV:______       Rate: ______      FiO2: ______    __x__  Patent Airway    __x__  Full return of protective reflexes    __x__  Full recovery from anesthesia / back to baseline status    Vitals:  T(C): 36.2 (05-25-21 @ 07:28), Max: 36.9 (05-24-21 @ 21:00)  HR: 74 (05-25-21 @ 07:28) (74 - 80)  BP: 117/62 (05-25-21 @ 07:28) (117/62 - 119/66)  RR: 18 (05-25-21 @ 07:28) (18 - 18)  SpO2: --    Mental Status:  __x__ Awake   ___x__ Alert   _____ Drowsy   _____ Sedated    Nausea/Vomiting:  __x__ NO  ______Yes,   See Post - Op Orders          Pain Scale (0-10):  _____    Treatment: ____ None    __x__ See Post - Op/PCA Orders    Post - Operative Fluids:   ____ Oral   __x__ See Post - Op Orders    Plan: Discharge:   ____Home       __X___Floor     _____Critical Care    _____  Other:_________________    Comments: Patient had smooth intraoperative event, no anesthesia complication.  PACU Vital signs: HR:    82        BP:   117     /64          RR:  18           O2 Sat:  100     %     Temp 97f

## 2021-05-25 NOTE — PRE-ANESTHESIA EVALUATION ADULT - NSANTHADDINFOFT_GEN_ALL_CORE
Pt preferred to have sedation for MRI, He has ICD with EF 20-25%. D/W MRI team re: management of ICD during the procedure. Pt preferred to have sedation for MRI, He has ICD with EF 20-25%. D/W MRI team re: management of ICD during the procedure- Switch to asynchronous mode.

## 2021-05-25 NOTE — PROGRESS NOTE ADULT - ASSESSMENT
ASSESSMENT  65 year old male with PMH of CAD s/p MI, PCI/CABG, CHFrEF (15-20%), has ICD, HTN, celiac disease, DM, neuropathy, chronic b/l LE ulcers presents, severe chronic pain,  hx infected R TKR with MRSA (was eventually cemented so has limited ROM R knee), and history of cholecystostomy tube placement in February 2020 for acute cholecystitis s/p removal in May 2020 with multiple presentations including persistent bilious drainage from the prior tract site as well as a RUQ abdominal abscess at the site s/p drainage who presents with left knee pain    IMPRESSION  #Septic Arthritis of left knee with MSSA bacteremia  - s/p arthrocentesis of left knee - consistent with baterial septic infection   - s/p OR washout 4/26 -- purulent fluid in left knee with significant tricompartmental arthritis   - Blood Cx 4/26 MSSA  - OR Cx 4/26 MSSA  - Blood Cx 4/27 NG, 4/28 growing Staph   - Blood Cx 4/30-5/4 NG  - JOHN PAUL 5/5: no evidence of valvular or lead vegation; noted mild, non-mobile atheroma seen in the thoracic aorta.   - MR Knee w/wo IV Cont, Left (05.06.21 @ 19:05): Septic arthritis of the knee with osteomyelitis in the distal femur and proximal tibia. Associated large joint effusion with extensive synovitis. Noadditional collection identified.  - s/p knee tap 5/9 -- 15 cc of blood fluid; WBC 11865 (95% PMN)  - s/p knee tap 5/11 WBC 81472 (97% PMN)  - s/p Arthroscopic drainage of knee 5/25    #Biliary Drainage from previous perc sudhir site  - Previous Intraabdominal 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 Decreased area of right upper quadrant subcutaneous stranding at site of prior percutaneous cholecystostomy, without evidence of abscess.  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    #CHF s/p ICD  #DM   #Abx allergy: carvedilol (Other)  enalapril (Hives)  Entresto (Other)    Creatinine, Serum: 1.0 mg/dL (04.25.21 @ 22:15)  Weight (kg): 81 (26 Apr 2021 17:23)    RECOMMENDATIONS  - will follow recent scope cultures/cell counts  - follow-up MRI read  - nafcillin 2g q 4 hours    I will be away tomorrow and will be available on Thursday.   I will be unavailable by phone. Please message on Teams if with questions.  Spectra [phone]

## 2021-05-25 NOTE — PROGRESS NOTE ADULT - SUBJECTIVE AND OBJECTIVE BOX
FLOWER MARISCAL  65y, Male  Allergy: ACE inhibitors (Hives)  carvedilol (Other)  enalapril (Hives)  Entresto (Other)      LOS  29d    CHIEF COMPLAINT: Septic arthritis (25 May 2021 15:58)      INTERVAL EVENTS/HPI  - No acute events overnight  - T(F): , Max: 98.4 (05-24-21 @ 21:00)  - continued left leg pain   - WBC Count: 8.45 (05-25-21 @ 06:38)  WBC Count: 8.21 (05-24-21 @ 11:47)     - Creatinine, Serum: 0.9 (05-25-21 @ 06:38)  Creatinine, Serum: 0.8 (05-24-21 @ 11:47)       ROS  General: Denies rigors, nightsweats  HEENT: Denies headache, rhinorrhea, sore throat, eye pain  CV: Denies CP, palpitations  PULM: Denies wheezing, hemoptysis  GI: Denies hematemesis, hematochezia, melena  : Denies discharge, hematuria  MSK: Denies arthralgias, myalgias  SKIN: Denies rash, lesions  NEURO: Denies paresthesias, weakness  PSYCH: Denies depression, anxiety    VITALS:  T(F): 96.2, Max: 98.4 (05-24-21 @ 21:00)  HR: 82  BP: 130/60  RR: 18Vital Signs Last 24 Hrs  T(C): 35.7 (25 May 2021 13:31), Max: 36.9 (24 May 2021 21:00)  T(F): 96.2 (25 May 2021 13:31), Max: 98.4 (24 May 2021 21:00)  HR: 82 (25 May 2021 13:31) (74 - 82)  BP: 130/60 (25 May 2021 13:31) (117/62 - 130/60)  BP(mean): --  RR: 18 (25 May 2021 13:31) (18 - 18)  SpO2: --    PHYSICAL EXAM:  Gen: NAD, resting in bed  HEENT: Normocephalic, atraumatic  Neck: supple, no lymphadenopathy  CV: Regular rate & regular rhythm  Lungs: decreased BS at bases, no fremitus  Abdomen: Soft, BS present  Ext: Warm, well perfused  Neuro: non focal, awake  Skin: no rash, no erythema  Lines: no phlebitis    FH: Non-contributory  Social Hx: Non-contributory    TESTS & MEASUREMENTS:                        8.5    8.45  )-----------( 454      ( 25 May 2021 06:38 )             28.7     05-25    133<L>  |  98  |  17  ----------------------------<  73  5.1<H>   |  24  |  0.9    Ca    8.1<L>      25 May 2021 06:38    TPro  6.3  /  Alb  2.6<L>  /  TBili  0.6  /  DBili  x   /  AST  18  /  ALT  11  /  AlkPhos  86  05-25    eGFR if : 105 mL/min/1.73M2 (05-25-21 @ 06:38)  eGFR if Non African American: 91 mL/min/1.73M2 (05-25-21 @ 06:38)    LIVER FUNCTIONS - ( 25 May 2021 06:38 )  Alb: 2.6 g/dL / Pro: 6.3 g/dL / ALK PHOS: 86 U/L / ALT: 11 U/L / AST: 18 U/L / GGT: x               Culture - Body Fluid with Gram Stain (collected 05-20-21 @ 16:57)  Source: .Body Fluid Synovial Fluid  Gram Stain (05-21-21 @ 07:48):    No polymorphonuclear leukocytes seen    No organisms seen    by cytocentrifuge  Preliminary Report (05-21-21 @ 19:52):    No growth    Culture - Acid Fast - Body Fluid w/Smear (collected 05-11-21 @ 11:54)  Source: .Body Fluid Knee Fluid  Preliminary Report (05-19-21 @ 15:03):    No growth at 1 week.    Culture - Body Fluid with Gram Stain (collected 05-09-21 @ 14:10)  Source: .Body Fluid Synovial Fluid  Gram Stain (05-09-21 @ 23:07):    Numerous polymorphonuclear leukocytes per low power field    No organisms seen per oil power field  Final Report (05-23-21 @ 16:42):    No growth at 14 days.    Culture - Blood (collected 05-04-21 @ 05:33)  Source: .Blood None  Final Report (05-09-21 @ 18:00):    No Growth Final    Culture - Blood (collected 05-03-21 @ 07:47)  Source: .Blood None  Final Report (05-08-21 @ 18:00):    No Growth Final    Culture - Blood (collected 05-02-21 @ 06:11)  Source: .Blood None  Final Report (05-07-21 @ 18:01):    No Growth Final    Culture - Body Fluid with Gram Stain (collected 05-01-21 @ 16:43)  Source: Bile biliary drain  Gram Stain (05-02-21 @ 23:26):    Numerous polymorphonuclear leukocytes per low power field    No organisms seen per oil power field  Final Report (05-07-21 @ 17:49):    Few Pseudomonas aeruginosa    Few Enterococcus faecium (vancomycin resistant)  Organism: Pseudomonas aeruginosa  Enterococcus faecium (vancomycin resistant) (05-07-21 @ 17:49)  Organism: Enterococcus faecium (vancomycin resistant) (05-07-21 @ 17:49)      -  Ampicillin: R >8 Predicts results to ampicillin/sulbactam, amoxacillin-clavulanate and  piperacillin-tazobactam.      -  Daptomycin: SDD 4 The breakpoint for SDD (sensitive dose dependent)is based on a dosage regimen of 8-12 mg/kg administered every 24 h in adults and is intended for serious infections due to E. faecium. Consultation with an infectious diseases specialist is recommended.      -  Levofloxacin: R >4      -  Linezolid: S 1      -  Tetra/Doxy: S <=1      -  Vancomycin: R >16      Method Type: YOLIE  Organism: Pseudomonas aeruginosa (05-07-21 @ 17:49)      -  Amikacin: S <=16      -  Aztreonam: S <=4      -  Cefepime: S <=2      -  Ceftazidime: S 4      -  Ciprofloxacin: S <=0.25      -  Gentamicin: S 4      -  Imipenem: S <=1      -  Levofloxacin: S <=0.5      -  Meropenem: S <=1      -  Piperacillin/Tazobactam: S <=8      -  Tobramycin: S <=2      Method Type: YOLIE    Culture - Blood (collected 05-01-21 @ 06:49)  Source: .Blood None  Final Report (05-06-21 @ 18:00):    No Growth Final    Culture - Blood (collected 05-01-21 @ 06:49)  Source: .Blood None  Final Report (05-06-21 @ 18:00):    No Growth Final    Culture - Blood (collected 04-30-21 @ 17:37)  Source: .Blood None  Final Report (05-05-21 @ 23:00):    No Growth Final    Culture - Blood (collected 04-30-21 @ 11:15)  Source: .Blood None  Final Report (05-05-21 @ 23:00):    No Growth Final    Culture - Blood (collected 04-28-21 @ 08:13)  Source: .Blood Blood  Gram Stain (04-29-21 @ 15:21):    Growth in anaerobic bottle: Gram Positive Cocci in Clusters  Final Report (04-30-21 @ 14:03):    Growth in anaerobic bottle: Staphylococcus aureus    See previous culture [id number]    Culture - Blood (collected 04-28-21 @ 08:13)  Source: .Blood Blood  Gram Stain (04-30-21 @ 02:25):    Growth in anaerobic bottle: Gram Positive Cocci in Clusters  Final Report (04-30-21 @ 20:23):    Growth in anaerobic bottle: Staphylococcus aureus    See previous culture [id number]    Culture - Blood (collected 04-27-21 @ 06:14)  Source: .Blood None  Final Report (05-02-21 @ 13:01):    No Growth Final    Culture - Surgical Swab (collected 04-26-21 @ 08:52)  Source: .Surgical Swab None  Final Report (05-01-21 @ 17:29):    Moderate Staphylococcus aureus  Organism: Staphylococcus aureus (05-01-21 @ 17:29)  Organism: Staphylococcus aureus (05-01-21 @ 17:29)      -  Ampicillin/Sulbactam: S <=8/4      -  Cefazolin: S <=4      -  Clindamycin: R <=0.25 This isolate is presumed to be clindamycin resistant based on detection of inducible resistance. Clindamycin may still be effective in some patients.      -  Erythromycin: R >4      -  Gentamicin: S <=1 Should not be used as monotherapy      -  Oxacillin: S <=0.25      -  Penicillin: R 4      -  RIF- Rifampin: S <=1 Should not be used as monotherapy      -  Tetra/Doxy: S <=1      -  Trimethoprim/Sulfamethoxazole: S <=0.5/9.5      -  Vancomycin: S 1      Method Type: YOLIE    Culture - Body Fluid with Gram Stain (collected 04-26-21 @ 01:15)  Source: .Body Fluid Synovial Fluid  Gram Stain (04-26-21 @ 11:35):    polymorphonuclear leukocytes per low power field    No organisms seen per oil power field    by cytocentrifuge  Final Report (05-10-21 @ 10:35):    Numerous Staphylococcus aureus  Organism: Staphylococcus aureus (05-10-21 @ 10:35)  Organism: Staphylococcus aureus (05-10-21 @ 10:35)      -  Ampicillin/Sulbactam: S <=8/4      -  Cefazolin: S <=4      -  Clindamycin: R <=0.25 This isolate is presumed to be clindamycin resistant based on detection of inducible resistance. Clindamycin may still be effective in some patients.      -  Erythromycin: R >4      -  Gentamicin: S <=1 Should not be used as monotherapy      -  Oxacillin: S <=0.25      -  Penicillin: R 4      -  RIF- Rifampin: S <=1 Should not be used as monotherapy      -  Tetra/Doxy: S <=1      -  Trimethoprim/Sulfamethoxazole: S <=0.5/9.5      -  Vancomycin: S 2      Method Type: YOLIE    Culture - Urine (collected 04-26-21 @ 00:31)  Source: .Urine Clean Catch (Midstream)  Final Report (04-27-21 @ 10:57):    <10,000 CFU/mL Normal Urogenital Tricia    Culture - Blood (collected 04-26-21 @ 00:31)  Source: .Blood Blood  Gram Stain (04-27-21 @ 01:48):    Growth in aerobic bottle: Gram Positive Cocci in Clusters  Final Report (04-28-21 @ 16:38):    Growth in aerobic bottle: Staphylococcus aureus    ***Blood Panel PCR results on this specimen are available    approximately 3 hours after the Gram stain result.***    Gram stain, PCR, and/or culture results may not always    correspond due to difference in methodologies.    ************************************************************    This PCR assay was performed by multiplex PCR. This    Assay tests for 66 bacterial and resistance gene targets.    Please refer to the [facility] [employer] test directory    at [email or web address] for details.  Organism: Blood Culture PCR  Staphylococcus aureus (04-28-21 @ 16:38)  Organism: Staphylococcus aureus (04-28-21 @ 16:38)      -  Ampicillin/Sulbactam: S <=8/4      -  Cefazolin: S <=4      -  Clindamycin: R 0.5 This isolate is presumed to be clindamycin resistant based on detection of inducible resistance. Clindamycin may still be effective in some patients.      -  Erythromycin: R >4      -  Gentamicin: S <=1 Should not be used as monotherapy      -  Oxacillin: S <=0.25      -  Penicillin: R 4      -  RIF- Rifampin: S <=1 Should not be used as monotherapy      -  Tetra/Doxy: S <=1      -  Trimethoprim/Sulfamethoxazole: S <=0.5/9.5      -  Vancomycin: S 2      Method Type: YOLIE  Organism: Blood Culture PCR (04-28-21 @ 16:38)      -  Staphylococcus aureus: Detec Any isolate of Staphylococcus aureus from a blood culture is NOT considered a contaminant.      Method Type: PCR    Culture - Blood (collected 04-26-21 @ 00:31)  Source: .Blood Blood  Gram Stain (04-27-21 @ 12:53):    Growth in aerobic bottle: Gram Positive Cocci in Clusters    Growth in anaerobic bottle: Gram Positive Cocci in Clusters  Final Report (04-28-21 @ 16:39):    Growth in aerobic and anaerobic bottles: Staphylococcus aureus    See previous culture [id number]            INFECTIOUS DISEASES TESTING  COVID-19 PCR: NotDetec (05-25-21 @ 09:04)  COVID-19 PCR: NotDetec (05-02-21 @ 08:52)  COVID-19 PCR: Detected (04-29-21 @ 14:33)  COVID-19 PCR: NotDetec (04-26-21 @ 06:00)  COVID-19 PCR: NotDetec (03-12-21 @ 11:00)  COVID-19 PCR: Detected (02-05-21 @ 13:47)  COVID-19 PCR: NotDetec (10-13-20 @ 09:08)  COVID-19 PCR: NotDetec (10-03-20 @ 19:54)  COVID-19 PCR: NotDetec (09-01-20 @ 20:40)  COVID-19 PCR: NotDetec (07-30-20 @ 07:43)  COVID-19 PCR: NotDetec (07-16-20 @ 19:46)      INFLAMMATORY MARKERS  Sedimentation Rate, Erythrocyte: 127 mm/Hr (05-18-21 @ 06:36)  C-Reactive Protein, Serum: 59 mg/L (05-18-21 @ 06:36)  Sedimentation Rate, Erythrocyte: 106 mm/Hr (05-15-21 @ 08:56)  C-Reactive Protein, Serum: 81 mg/L (05-15-21 @ 08:56)      RADIOLOGY & ADDITIONAL TESTS:  I have personally reviewed the last available Chest xray  CXR      CT      CARDIOLOGY TESTING      MEDICATIONS  ascorbic acid 500 Oral daily  aspirin enteric coated 81 Oral daily  atorvastatin 80 Oral at bedtime  chlorhexidine 4% Liquid 1 Topical <User Schedule>  dextrose 40% Gel 15 Oral once  dextrose 5%. 1000 IV Continuous <Continuous>  dextrose 5%. 1000 IV Continuous <Continuous>  dextrose 50% Injectable 25 IV Push once  dextrose 50% Injectable 12.5 IV Push once  dextrose 50% Injectable 25 IV Push once  digoxin     Tablet 125 Oral daily  DULoxetine 90 Oral daily  enoxaparin Injectable 40 SubCutaneous daily  gabapentin 300 Oral three times a day  glucagon  Injectable 1 IntraMuscular once  insulin glargine Injectable (LANTUS) 25 SubCutaneous at bedtime  insulin lispro (ADMELOG) corrective regimen sliding scale  SubCutaneous three times a day before meals  metoprolol succinate ER 25 Oral daily  multivitamin/minerals 1 Oral daily  nafcillin  IVPB 2 IV Intermittent every 4 hours  nafcillin  IVPB     nystatin Cream 1 Topical two times a day  pantoprazole    Tablet 40 Oral before breakfast  polyethylene glycol 3350 17 Oral two times a day  prasugrel 10 Oral daily  senna 2 Oral at bedtime  spironolactone 25 Oral daily      WEIGHT  Weight (kg): 81 (05-25-21 @ 07:28)  Creatinine, Serum: 0.9 mg/dL (05-25-21 @ 06:38)      ANTIBIOTICS:  nafcillin  IVPB 2 Gram(s) IV Intermittent every 4 hours  nafcillin  IVPB          All available historical records have been reviewed

## 2021-05-25 NOTE — PROGRESS NOTE ADULT - ASSESSMENT
A/P: 64 yo male admitted with left septic knee    --Left septic knee with synovitis:  MRI left knee pending today. Continue Nafcillin. Plan is to dc him on cefazolin 2 q8 as per ID for total of 6 weeks. ID and Pain management well appreciated.   --MSSA Bacteremia: On Nafcillin.   --Biliary drainage: Surgery reconsulted per patient's request for cholecystectomy. Drainage cx is pending  --DM2: Continue current regimen  --Anemia: Iron  def combined with ACD  --CAD: ASA, statin, toprol and Prasugrel  --CHF with reduced EF: Aldactone and Lasix  --Covid vaccine status: He has received one dose and is supposed to receive the second dose on 5/15/21 but he was already hospitalized. ID on board and will appreciate their input on that.    --DVT ppx: Lovenox, prasugrel and ASA  --Dispo: Pending MRI result and ortho rec     A/P: 66 yo male admitted with left septic knee    --Left septic knee with synovitis:  MRI left knee pending today. Continue Nafcillin. Plan is to dc him on cefazolin 2 q8 as per ID for total of 6 weeks. ID and Pain management well appreciated.   --MSSA Bacteremia: On Nafcillin.   --Biliary drainage: Surgery reconsulted per patient's request for cholecystectomy. Drainage cx is pending  --Hyperkalemia: 5.1. Will give a dose of lokelma 5 gram once. AM labs ordered  --DM2: Continue current regimen  --Anemia: Iron  def combined with ACD  --CAD: ASA, statin, toprol and Prasugrel  --CHF with reduced EF: Aldactone and Lasix  --Covid vaccine status: He has received one dose and is supposed to receive the second dose on 5/15/21 but he was already hospitalized. ID on board and will appreciate their input on that.    --DVT ppx: Lovenox, prasugrel and ASA  --Dispo: Pending MRI result and ortho rec

## 2021-05-26 LAB
-  AMIKACIN: SIGNIFICANT CHANGE UP
-  AZTREONAM: SIGNIFICANT CHANGE UP
-  CEFEPIME: SIGNIFICANT CHANGE UP
-  CEFTAZIDIME: SIGNIFICANT CHANGE UP
-  CIPROFLOXACIN: SIGNIFICANT CHANGE UP
-  GENTAMICIN: SIGNIFICANT CHANGE UP
-  IMIPENEM: SIGNIFICANT CHANGE UP
-  LEVOFLOXACIN: SIGNIFICANT CHANGE UP
-  MEROPENEM: SIGNIFICANT CHANGE UP
-  PIPERACILLIN/TAZOBACTAM: SIGNIFICANT CHANGE UP
-  TOBRAMYCIN: SIGNIFICANT CHANGE UP
ALBUMIN SERPL ELPH-MCNC: 2.7 G/DL — LOW (ref 3.5–5.2)
ALP SERPL-CCNC: 86 U/L — SIGNIFICANT CHANGE UP (ref 30–115)
ALT FLD-CCNC: 11 U/L — SIGNIFICANT CHANGE UP (ref 0–41)
ANION GAP SERPL CALC-SCNC: 6 MMOL/L — LOW (ref 7–14)
AST SERPL-CCNC: 17 U/L — SIGNIFICANT CHANGE UP (ref 0–41)
BILIRUB SERPL-MCNC: 0.4 MG/DL — SIGNIFICANT CHANGE UP (ref 0.2–1.2)
BLD GP AB SCN SERPL QL: SIGNIFICANT CHANGE UP
BUN SERPL-MCNC: 17 MG/DL — SIGNIFICANT CHANGE UP (ref 10–20)
CALCIUM SERPL-MCNC: 8.3 MG/DL — LOW (ref 8.5–10.1)
CHLORIDE SERPL-SCNC: 100 MMOL/L — SIGNIFICANT CHANGE UP (ref 98–110)
CO2 SERPL-SCNC: 27 MMOL/L — SIGNIFICANT CHANGE UP (ref 17–32)
CREAT SERPL-MCNC: 0.9 MG/DL — SIGNIFICANT CHANGE UP (ref 0.7–1.5)
GLUCOSE BLDC GLUCOMTR-MCNC: 205 MG/DL — HIGH (ref 70–99)
GLUCOSE BLDC GLUCOMTR-MCNC: 214 MG/DL — HIGH (ref 70–99)
GLUCOSE BLDC GLUCOMTR-MCNC: 65 MG/DL — LOW (ref 70–99)
GLUCOSE BLDC GLUCOMTR-MCNC: 67 MG/DL — LOW (ref 70–99)
GLUCOSE BLDC GLUCOMTR-MCNC: 75 MG/DL — SIGNIFICANT CHANGE UP (ref 70–99)
GLUCOSE BLDC GLUCOMTR-MCNC: 83 MG/DL — SIGNIFICANT CHANGE UP (ref 70–99)
GLUCOSE SERPL-MCNC: 66 MG/DL — LOW (ref 70–99)
HCT VFR BLD CALC: 25.6 % — LOW (ref 42–52)
HCT VFR BLD CALC: 26.3 % — LOW (ref 42–52)
HGB BLD-MCNC: 7.6 G/DL — LOW (ref 14–18)
HGB BLD-MCNC: 7.9 G/DL — LOW (ref 14–18)
MCHC RBC-ENTMCNC: 21.2 PG — LOW (ref 27–31)
MCHC RBC-ENTMCNC: 21.5 PG — LOW (ref 27–31)
MCHC RBC-ENTMCNC: 29.7 G/DL — LOW (ref 32–37)
MCHC RBC-ENTMCNC: 30 G/DL — LOW (ref 32–37)
MCV RBC AUTO: 71.5 FL — LOW (ref 80–94)
MCV RBC AUTO: 71.5 FL — LOW (ref 80–94)
METHOD TYPE: SIGNIFICANT CHANGE UP
NRBC # BLD: 0 /100 WBCS — SIGNIFICANT CHANGE UP (ref 0–0)
NRBC # BLD: 0 /100 WBCS — SIGNIFICANT CHANGE UP (ref 0–0)
PLATELET # BLD AUTO: 493 K/UL — HIGH (ref 130–400)
PLATELET # BLD AUTO: 511 K/UL — HIGH (ref 130–400)
POTASSIUM SERPL-MCNC: 5.1 MMOL/L — HIGH (ref 3.5–5)
POTASSIUM SERPL-SCNC: 5.1 MMOL/L — HIGH (ref 3.5–5)
PROT SERPL-MCNC: 6.3 G/DL — SIGNIFICANT CHANGE UP (ref 6–8)
RBC # BLD: 3.58 M/UL — LOW (ref 4.7–6.1)
RBC # BLD: 3.68 M/UL — LOW (ref 4.7–6.1)
RBC # FLD: 23.3 % — HIGH (ref 11.5–14.5)
RBC # FLD: 23.8 % — HIGH (ref 11.5–14.5)
SODIUM SERPL-SCNC: 133 MMOL/L — LOW (ref 135–146)
WBC # BLD: 8.68 K/UL — SIGNIFICANT CHANGE UP (ref 4.8–10.8)
WBC # BLD: 9.75 K/UL — SIGNIFICANT CHANGE UP (ref 4.8–10.8)
WBC # FLD AUTO: 8.68 K/UL — SIGNIFICANT CHANGE UP (ref 4.8–10.8)
WBC # FLD AUTO: 9.75 K/UL — SIGNIFICANT CHANGE UP (ref 4.8–10.8)

## 2021-05-26 PROCEDURE — 99233 SBSQ HOSP IP/OBS HIGH 50: CPT

## 2021-05-26 RX ORDER — INSULIN HUMAN 100 [IU]/ML
10 INJECTION, SOLUTION SUBCUTANEOUS ONCE
Refills: 0 | Status: DISCONTINUED | OUTPATIENT
Start: 2021-05-26 | End: 2021-05-26

## 2021-05-26 RX ORDER — INSULIN GLARGINE 100 [IU]/ML
10 INJECTION, SOLUTION SUBCUTANEOUS AT BEDTIME
Refills: 0 | Status: DISCONTINUED | OUTPATIENT
Start: 2021-05-26 | End: 2021-05-27

## 2021-05-26 RX ORDER — DEXTROSE 50 % IN WATER 50 %
50 SYRINGE (ML) INTRAVENOUS ONCE
Refills: 0 | Status: DISCONTINUED | OUTPATIENT
Start: 2021-05-26 | End: 2021-05-26

## 2021-05-26 RX ORDER — SODIUM ZIRCONIUM CYCLOSILICATE 10 G/10G
10 POWDER, FOR SUSPENSION ORAL ONCE
Refills: 0 | Status: COMPLETED | OUTPATIENT
Start: 2021-05-26 | End: 2021-05-26

## 2021-05-26 RX ORDER — DEXTROSE 50 % IN WATER 50 %
25 SYRINGE (ML) INTRAVENOUS ONCE
Refills: 0 | Status: COMPLETED | OUTPATIENT
Start: 2021-05-26 | End: 2021-05-26

## 2021-05-26 RX ORDER — MEROPENEM 1 G/30ML
2000 INJECTION INTRAVENOUS EVERY 8 HOURS
Refills: 0 | Status: DISCONTINUED | OUTPATIENT
Start: 2021-05-26 | End: 2021-05-27

## 2021-05-26 RX ADMIN — Medication 500 MILLIGRAM(S): at 11:36

## 2021-05-26 RX ADMIN — SPIRONOLACTONE 25 MILLIGRAM(S): 25 TABLET, FILM COATED ORAL at 05:44

## 2021-05-26 RX ADMIN — MORPHINE SULFATE 4 MILLIGRAM(S): 50 CAPSULE, EXTENDED RELEASE ORAL at 06:05

## 2021-05-26 RX ADMIN — PANTOPRAZOLE SODIUM 40 MILLIGRAM(S): 20 TABLET, DELAYED RELEASE ORAL at 06:08

## 2021-05-26 RX ADMIN — ATORVASTATIN CALCIUM 80 MILLIGRAM(S): 80 TABLET, FILM COATED ORAL at 21:24

## 2021-05-26 RX ADMIN — MORPHINE SULFATE 4 MILLIGRAM(S): 50 CAPSULE, EXTENDED RELEASE ORAL at 22:13

## 2021-05-26 RX ADMIN — OXYCODONE HYDROCHLORIDE 10 MILLIGRAM(S): 5 TABLET ORAL at 22:30

## 2021-05-26 RX ADMIN — Medication 5 MILLIGRAM(S): at 22:13

## 2021-05-26 RX ADMIN — OXYCODONE HYDROCHLORIDE 10 MILLIGRAM(S): 5 TABLET ORAL at 12:57

## 2021-05-26 RX ADMIN — Medication 125 MICROGRAM(S): at 05:44

## 2021-05-26 RX ADMIN — MORPHINE SULFATE 4 MILLIGRAM(S): 50 CAPSULE, EXTENDED RELEASE ORAL at 02:03

## 2021-05-26 RX ADMIN — MORPHINE SULFATE 4 MILLIGRAM(S): 50 CAPSULE, EXTENDED RELEASE ORAL at 22:30

## 2021-05-26 RX ADMIN — OXYCODONE HYDROCHLORIDE 10 MILLIGRAM(S): 5 TABLET ORAL at 17:00

## 2021-05-26 RX ADMIN — ENOXAPARIN SODIUM 40 MILLIGRAM(S): 100 INJECTION SUBCUTANEOUS at 11:36

## 2021-05-26 RX ADMIN — OXYCODONE HYDROCHLORIDE 10 MILLIGRAM(S): 5 TABLET ORAL at 12:27

## 2021-05-26 RX ADMIN — NAFCILLIN 200 GRAM(S): 10 INJECTION, POWDER, FOR SOLUTION INTRAVENOUS at 21:24

## 2021-05-26 RX ADMIN — NAFCILLIN 200 GRAM(S): 10 INJECTION, POWDER, FOR SOLUTION INTRAVENOUS at 18:07

## 2021-05-26 RX ADMIN — PRASUGREL 10 MILLIGRAM(S): 5 TABLET, FILM COATED ORAL at 11:36

## 2021-05-26 RX ADMIN — INSULIN GLARGINE 10 UNIT(S): 100 INJECTION, SOLUTION SUBCUTANEOUS at 21:24

## 2021-05-26 RX ADMIN — POLYETHYLENE GLYCOL 3350 17 GRAM(S): 17 POWDER, FOR SOLUTION ORAL at 18:21

## 2021-05-26 RX ADMIN — OXYCODONE HYDROCHLORIDE 10 MILLIGRAM(S): 5 TABLET ORAL at 21:24

## 2021-05-26 RX ADMIN — Medication 5 MILLIGRAM(S): at 12:47

## 2021-05-26 RX ADMIN — GABAPENTIN 300 MILLIGRAM(S): 400 CAPSULE ORAL at 21:24

## 2021-05-26 RX ADMIN — NAFCILLIN 200 GRAM(S): 10 INJECTION, POWDER, FOR SOLUTION INTRAVENOUS at 05:44

## 2021-05-26 RX ADMIN — Medication 1 TABLET(S): at 11:36

## 2021-05-26 RX ADMIN — OXYCODONE HYDROCHLORIDE 10 MILLIGRAM(S): 5 TABLET ORAL at 16:29

## 2021-05-26 RX ADMIN — GABAPENTIN 300 MILLIGRAM(S): 400 CAPSULE ORAL at 14:33

## 2021-05-26 RX ADMIN — SODIUM ZIRCONIUM CYCLOSILICATE 10 GRAM(S): 10 POWDER, FOR SUSPENSION ORAL at 10:38

## 2021-05-26 RX ADMIN — NYSTATIN CREAM 1 APPLICATION(S): 100000 CREAM TOPICAL at 05:44

## 2021-05-26 RX ADMIN — NAFCILLIN 200 GRAM(S): 10 INJECTION, POWDER, FOR SOLUTION INTRAVENOUS at 01:58

## 2021-05-26 RX ADMIN — Medication 2: at 18:06

## 2021-05-26 RX ADMIN — GABAPENTIN 300 MILLIGRAM(S): 400 CAPSULE ORAL at 05:44

## 2021-05-26 RX ADMIN — Medication 25 MILLIGRAM(S): at 05:44

## 2021-05-26 RX ADMIN — SENNA PLUS 2 TABLET(S): 8.6 TABLET ORAL at 21:24

## 2021-05-26 RX ADMIN — DULOXETINE HYDROCHLORIDE 90 MILLIGRAM(S): 30 CAPSULE, DELAYED RELEASE ORAL at 11:36

## 2021-05-26 RX ADMIN — Medication 81 MILLIGRAM(S): at 11:36

## 2021-05-26 RX ADMIN — NYSTATIN CREAM 1 APPLICATION(S): 100000 CREAM TOPICAL at 18:21

## 2021-05-26 RX ADMIN — MEROPENEM 200 MILLIGRAM(S): 1 INJECTION INTRAVENOUS at 18:07

## 2021-05-26 RX ADMIN — MORPHINE SULFATE 4 MILLIGRAM(S): 50 CAPSULE, EXTENDED RELEASE ORAL at 02:30

## 2021-05-26 RX ADMIN — Medication 25 GRAM(S): at 10:39

## 2021-05-26 NOTE — PRE-ANESTHESIA EVALUATION ADULT - NSANTHPEFT_GEN_ALL_CORE
GENERAL: deconditioned  NERVOUS SYSTEM:  Alert & Oriented X3  PULMONARY: No rales, rhonchi, wheezing on auscultation  CARDIOVASCULAR: Regular rate and rhythm; No murmurs  GI: Soft, Nondistended   EXTREMITIES:  L knee dressing/ no drain
cor: rrr s1s2 nl  lungs: clear

## 2021-05-26 NOTE — PROGRESS NOTE ADULT - SUBJECTIVE AND OBJECTIVE BOX
Patient is a 65y old  Male who presents with a chief complaint of Septic arthritis (26 May 2021 15:01)      Subjective:  Patient seen and examined at bedside.        Review Of Systems: No chest pain, shortness of breath, or palpitations            Medications:  acetaminophen   Tablet .. 650 milliGRAM(s) Oral every 6 hours PRN  ascorbic acid 500 milliGRAM(s) Oral daily  aspirin enteric coated 81 milliGRAM(s) Oral daily  atorvastatin 80 milliGRAM(s) Oral at bedtime  baclofen 5 milliGRAM(s) Oral every 8 hours PRN  chlorhexidine 4% Liquid 1 Application(s) Topical <User Schedule>  dextrose 40% Gel 15 Gram(s) Oral once  dextrose 5%. 1000 milliLiter(s) IV Continuous <Continuous>  dextrose 5%. 1000 milliLiter(s) IV Continuous <Continuous>  dextrose 50% Injectable 25 Gram(s) IV Push once  dextrose 50% Injectable 12.5 Gram(s) IV Push once  dextrose 50% Injectable 25 Gram(s) IV Push once  digoxin     Tablet 125 MICROGram(s) Oral daily  DULoxetine 90 milliGRAM(s) Oral daily  enoxaparin Injectable 40 milliGRAM(s) SubCutaneous daily  furosemide    Tablet 40 milliGRAM(s) Oral daily PRN  gabapentin 300 milliGRAM(s) Oral three times a day  glucagon  Injectable 1 milliGRAM(s) IntraMuscular once  insulin glargine Injectable (LANTUS) 10 Unit(s) SubCutaneous at bedtime  insulin lispro (ADMELOG) corrective regimen sliding scale   SubCutaneous three times a day before meals  melatonin 10 milliGRAM(s) Oral at bedtime PRN  meropenem  IVPB 2000 milliGRAM(s) IV Intermittent every 8 hours  metoprolol succinate ER 25 milliGRAM(s) Oral daily  morphine  - Injectable 4 milliGRAM(s) IV Push every 4 hours PRN  multivitamin/minerals 1 Tablet(s) Oral daily  nafcillin  IVPB 2 Gram(s) IV Intermittent every 4 hours  nafcillin  IVPB      nystatin Cream 1 Application(s) Topical two times a day  oxyCODONE    IR 5 milliGRAM(s) Oral every 4 hours PRN  oxyCODONE    IR 10 milliGRAM(s) Oral every 4 hours PRN  pantoprazole    Tablet 40 milliGRAM(s) Oral before breakfast  polyethylene glycol 3350 17 Gram(s) Oral two times a day  prasugrel 10 milliGRAM(s) Oral daily  senna 2 Tablet(s) Oral at bedtime  spironolactone 25 milliGRAM(s) Oral daily      PAST MEDICAL & SURGICAL HISTORY:  Status post percutaneous transluminal coronary angioplasty  2 stents    Atherosclerosis of coronary artery  CAD (coronary artery disease)    Osteoarthritis    HLD (hyperlipidemia)    Blood clot due to device, implant, or graft  was on blood thinners    Diabetes  on insulin pump    HTN (hypertension)    CHF (congestive heart failure)  EF ~ 25%    History of celiac disease    Diverticulitis    STEMI (ST elevation myocardial infarction)    Diabetic neuropathy    Anxiety and depression    Other postprocedural status  Fixation hardware in foot LEFT    Stented coronary artery  10/18 heart attack  INFERIOR WALL MI    S/P CABG x 1  2018    S/P TKR (total knee replacement), right  with infection Mrsa   per pt he was cleared from MRSA infection    Surgery, elective  right knee wound debridement    History of open reduction and internal fixation (ORIF) procedure    H/O shoulder surgery  right    AICD (automatic cardioverter/defibrillator) present    Cholecystostomy care  drain inserted 2020 &amp; removed 4 months ago    History of tonsillectomy    History of hip replacement, total, right        Objective:  Vitals:  T(F): 96.5 (05-26), Max: 99.3 (05-26)  HR: 71 (05-26) (71 - 83)  BP: 105/58 (05-26) (105/58 - 112/58)  RR: 18 (05-26)  SpO2: --  I&O's Summary    25 May 2021 07:01  -  26 May 2021 07:00  --------------------------------------------------------  IN: 0 mL / OUT: 400 mL / NET: -400 mL        Physical Exam:  Appearance: No acute distress; well appearing  Eyes: PERRL, EOMI, pink conjunctiva  HENT: Normal oral muscosa  Cardiovascular: RRR, S1, S2, no murmurs, rubs, or gallops; no edema; no JVD  Respiratory: bibasilar crackles  Gastrointestinal: soft, non-tender, non-distended with normal bowel sounds  Musculoskeletal: No clubbing; no joint deformity   Neurologic: Non-focal  Lymphatic: No lymphadenopathy  Psychiatry: AAOx3, mood & affect appropriate  Skin: rashes, ulcers of bilateral LE                          7.9    9.75  )-----------( 511      ( 26 May 2021 06:24 )             26.3     05-26    133<L>  |  100  |  17  ----------------------------<  66<L>  5.1<H>   |  27  |  0.9    Ca    8.3<L>      26 May 2021 06:24    TPro  6.3  /  Alb  2.7<L>  /  TBili  0.4  /  DBili  x   /  AST  17  /  ALT  11  /  AlkPhos  86  05-26          New ECG(s): Personally reviewed    Echo:  < from: [facility] w/Probe Placement (05.05.21 @ 13:29) >    Summary:   1. Left ventricular ejection fraction, by visual estimation, is 20 to 25%.   2. Severely decreased global left ventricular systolic function.   3. Mildly enlarged right ventricle.   4. Mildly reduced RV systolic function.   5. Mild mitral valve regurgitation.   6. Mild tricuspid regurgitation.   7. Left atrial enlargement.   8. Right atrial enlargement.   9. Simple atheroma seen in the aortic arch and descending aorta.  10. No left atrial appendage thrombus.  11. RA and RV pacer leads noted with no evidence of vegetation.    < end of copied text >    Cath:  < from: Cardiac Cath Lab - Adult (12.12.18 @ 10:29) >  SUMMARY:         HEMODYNAMICS: Hemodynamic assessment demonstrates moderately to severely    elevated LVEDP.         CORONARY CIRCULATION: There was 1-vessel coronary artery disease (LAD).    Bypass grafts were patent.         CARDIAC STRUCTURES: EF by echo was 40 %.         INDICATIONS: Angina/MI: stable angina. Coronary artery disease: ischemic    heart disease. Congestive heart failure with ischemic cardiomyopathy.    Cardiac: dyspnea.         HISTORY: The patient has hypertension, HFREF,medication-treated    dyslipidemia, morbid obesity, and a former history of cigarette use. PRIOR    CARDIOVASCULAR PROCEDURES: Stent of the proximal LAD, 1st obtusemarginal,    and distal RCA. Coronary bypass.         PROCEDURES PERFORMED:         --  Left heart catheterization.    --  Bilateral coronary angiography.    --  LIMA graft angiography.         PROCEDURE: The risks and alternatives of the procedures and conscious    sedation were explained to the patient and informed consent was obtained.    The patient was brought to the cath lab and placed on the table. The    planned puncture sites were prepped and draped in the usual sterile    fashion. Cardiac catheterization performed urgently.         --  Right femoral artery access. The puncture site was infiltrated with 10    ml 2 % lidocaine. The vessel was accessed using the modified Seldinger    technique with a Micro needle, a wire was threaded into the vessel, and a    6 Fr Sheath 12cm was advanced over the wire into the vessel.         --  Left heart catheterization. A JR4 catheter was advanced to the    ascending aorta. After recording ascending aortic pressure, the catheter    was advanced across the aortic valve and left ventricular pressure was    recorded.         --  Right and left coronary angiography. A JR4 catheter was advanced to the    aorta and positioned in the vessel ostia under fluoroscopic guidance. For    proper position, JL4.5 and XB4 catheter(s) were also used. Angiography was    performed in multiple projections using power-assisted injection of    contrast.         --  Left internal mammary graft angiography. A catheter was advanced to the    aorta and positioned in the vessel ostia under fluoroscopic guidance.    Angiography was performed in multiple projections using power-assisted    injection of contrast.         PROCEDURE COMPLETION: TIMING: Test started at 10:48. Test concluded at    11:17. RADIATION EXPOSURE: Fluoroscopy time: 6.48 min. MEDIA: Cine ID    [id number].         HEMOSTASIS: The sheath was removed. Hemostasis was successful at the right    femoral artery access site using a closure device ( Vascade).         MEDICATIONS GIVEN: Midazolam, 2mg, IV, at 10:48. Fentanyl, 25 mcg, IV, at    10:48. Nitroglycerin, 100 mcg, IA, at 11:12. Zofran (Ondansetron), 4 mg,    IV, at 10:45. Lasix (Furosemide), 40 mg, IV, at 11:16.         CONTRAST GIVEN: Omnipaque 100 ml.         HEMODYNAMICS: Hemodynamic assessment demonstrates moderately to severely    elevated LVEDP.         VENTRICLES: EF by echo was 40 %.         VALVES: AORTIC VALVE: No significant aortic gradient.         CORONARY CIRCULATION: The coronary circulation is right dominant. Therewas    1-vessel coronary artery disease (LAD). Bypass grafts were patent. Left    main: Normal. LAD: The vessel was medium sized. Ostial LAD: There was a    discrete 95 % stenosis. There was ARIANA grade 3 flow through the vessel    (brisk flow). Proximal LAD: There was a diffuse 70 % stenosis at the site    of a prior stent. There was ARIANA grade 3 flow through the vessel (brisk    flow). Mid LAD: The vessel was small to medium sized. Angiography showed    mild atherosclerosis with no flow limitinglesions. Distal LAD: The vessel    was small to medium sized. Angiography showed mild atherosclerosis with no    flow limiting lesions. The artery was supplied by a patent bypass graft.    Circumflex: The vessel was medium sized. Proximal circumflex:Angiography    showed mild atherosclerosis with no flow limiting lesions. Distal    circumflex: The vessel was small to medium sized. Angiography showed minor    luminal irregularities with no flow limiting lesions. 1st obtuse marginal:    The vesselwas medium sized. Prior stent was patent. There was a discrete    50- 60 % stenosis at a site with no prior intervention, at the ostium of    the vessel segment, at the proximal margin of the stented segment. The    lesion was hazy. There was ARIANA grade 3 flow through the vessel (brisk    flow). RCA: The vessel was medium to large sized. Proximal RCA:    Angiography showed no evidence of disease. Mid RCA: Angiography showed no    evidence of disease. Distal RCA: The vessel was medium to large sized.    There was a discrete 30- 40 % stenosis at the site of a prior stent. There    was ARIANA grade 3 flow through the vessel (brisk flow). Graft to the LAD:    The graft was a medium sized LIMA. Graft angiography showed no evidence of    disease.        COMPLICATIONS: No complications occurred during the cath lab visit.         IMPRESSIONS: There is significant single vessel native coronary artery    disease. Patent LIMA to LAD. No significant restenosis in RCA/OM1.         RECOMMENDATIONS:    The patient should continue with the present medications.    The patient's diuretic regimen should be carefully increased.    The patient's anti-anginal regimen should be further intensified.    Patient management should include aggressive medical therapy, aggressive    risk factor modification, and close monitoring of BUN and creatinine. The    patient should follow a low fat diet.      < end of copied text >      Interpretation of Telemetry: not on telemetry

## 2021-05-26 NOTE — PROGRESS NOTE ADULT - ATTENDING COMMENTS
Pt seen - getting central line  Discussed surgery for tomorrow  Cardiac clearance  Transfuse 1 unit PRBCs  Will follow

## 2021-05-26 NOTE — PROGRESS NOTE ADULT - SUBJECTIVE AND OBJECTIVE BOX
Sub: Patient was in MRI this morning thus was not seen because he was in MRI    OBJ:     Vital Signs Last 24 Hrs  T(C): 35.7 (25 May 2021 13:31), Max: 36.9 (24 May 2021 21:00)  T(F): 96.2 (25 May 2021 13:31), Max: 98.4 (24 May 2021 21:00)  HR: 82 (25 May 2021 13:31) (74 - 82)  BP: 130/60 (25 May 2021 13:31) (117/62 - 130/60)  BP(mean): --  RR: 18 (25 May 2021 13:31) (18 - 18)  SpO2: --      LABS:                                   8.5    8.45  )-----------( 454      ( 25 May 2021 06:38 )             28.7   05-25    133<L>  |  98  |  17  ----------------------------<  73  5.1<H>   |  24  |  0.9    Ca    8.1<L>      25 May 2021 06:38    TPro  6.3  /  Alb  2.6<L>  /  TBili  0.6  /  DBili  x   /  AST  18  /  ALT  11  /  AlkPhos  86  05-25      PE:  Not done today since patient was in MRI at the time          Assessment and Plan:   · Assessment	  A/P: 66 yo male admitted with left septic knee    --Left septic knee with synovitis:  MRI left knee pending today. Continue Nafcillin. Plan is to dc him on cefazolin 2 q8 as per ID for total of 6 weeks. ID and Pain management well appreciated.   --MSSA Bacteremia: On Nafcillin.   --Biliary drainage: Surgery reconsulted per patient's request for cholecystectomy. Drainage cx is pending  --Hyperkalemia: 5.1. Will give a dose of lokelma 5 gram once. AM labs ordered  --DM2: Continue current regimen  --Anemia: Iron  def combined with ACD  --CAD: ASA, statin, toprol and Prasugrel  --CHF with reduced EF: Aldactone and Lasix  --Covid vaccine status: He has received one dose and is supposed to receive the second dose on 5/15/21 but he was already hospitalized. ID on board and will appreciate their input on that.    --DVT ppx: Lovenox, prasugrel and ASA  --Dispo: Pending MRI result and ortho rec                               7.9    9.75  )-----------( 511      ( 26 May 2021 06:24 )             26.3   Sub: Patient was sleepy but arousable and conversant after drinking juice.     OBJ:     Vital Signs Last 24 Hrs  T(C): 37.4 (26 May 2021 05:18), Max: 37.4 (26 May 2021 05:18)  T(F): 99.3 (26 May 2021 05:18), Max: 99.3 (26 May 2021 05:18)  HR: 83 (26 May 2021 05:18) (80 - 83)  BP: 111/58 (26 May 2021 05:18) (111/58 - 130/60)  BP(mean): --  RR: 18 (26 May 2021 05:18) (18 - 18)  SpO2: --    LABS:                                  7.9    9.75  )-----------( 511      ( 26 May 2021 06:24 )             26.3   05-26    133<L>  |  100  |  17  ----------------------------<  66<L>  5.1<H>   |  27  |  0.9    Ca    8.3<L>      26 May 2021 06:24    TPro  6.3  /  Alb  2.7<L>  /  TBili  0.4  /  DBili  x   /  AST  17  /  ALT  11  /  AlkPhos  86  05-26           Sub: Patient was sleepy but arousable and conversant after drinking juice.     OBJ:     Vital Signs Last 24 Hrs  T(C): 37.4 (26 May 2021 05:18), Max: 37.4 (26 May 2021 05:18)  T(F): 99.3 (26 May 2021 05:18), Max: 99.3 (26 May 2021 05:18)  HR: 83 (26 May 2021 05:18) (80 - 83)  BP: 111/58 (26 May 2021 05:18) (111/58 - 130/60)  BP(mean): --  RR: 18 (26 May 2021 05:18) (18 - 18)  SpO2: --    LABS:                                  7.9    9.75  )-----------( 511      ( 26 May 2021 06:24 )             26.3   05-26    133<L>  |  100  |  17  ----------------------------<  66<L>  5.1<H>   |  27  |  0.9    Ca    8.3<L>      26 May 2021 06:24    TPro  6.3  /  Alb  2.7<L>  /  TBili  0.4  /  DBili  x   /  AST  17  /  ALT  11  /  AlkPhos  86  05-26        PHYSICAL EXAM:  Gen: NAD, resting in bed  HEENT: Normocephalic, atraumatic  Neck: supple, no lymphadenopathy  CV: Regular rate & regular rhythm  Lungs: decreased BS at bases, no fremitus  Abdomen: Soft, BS present  Ext: Warm, well perfused  Neuro: non focal, awake  Skin: no rash, no erythema  Lines: no phlebitis

## 2021-05-26 NOTE — PRE-ANESTHESIA EVALUATION ADULT - NS MD HP INPLANTS MED DEV
Automatic Implantable Cardioverter Defibrillator/Insulin pump

## 2021-05-26 NOTE — PROGRESS NOTE ADULT - ASSESSMENT
IMPRESSION:  - CAD s/p PCI to RCA and OM1, s/p LIMA to LAD (2018)   - Severe ischemic cardiomyopathy (EF 15-20%) s/p AICD - currently compensated - not fluid overloaded  - DMII, HTN, DL  - Currently admitted with septic arthritis of the knee s/p drainage, going again in AM    Recommendations:   Patient is considered at intermediate risk for MACE going for a low risk procedure  Avoid fluid overload  Keep Hb>8  Ok to hold Effient for surgery, resume as soon as safe from a surgical perspective  c/w GDMT      IMPRESSION:  - CAD s/p PCI to RCA and OM1, s/p LIMA to LAD (2018)   - Severe ischemic cardiomyopathy (EF 15-20%) s/p CRT-D - currently compensated - not fluid overloaded  - DMII, HTN, DL  - Currently admitted with septic arthritis of the knee s/p drainage, going again for drainage, irrigation and debridement tomorrow    Recommendations:   Patient is considered at intermediate risk for MACE going for a low risk procedure  Avoid fluid overload  Ok to hold Effient for surgery, resume as soon as safe from a surgical perspective  c/w ASA 81 mg daily   c/w GDMT

## 2021-05-26 NOTE — PROGRESS NOTE ADULT - SUBJECTIVE AND OBJECTIVE BOX
ORTHOPEDIC SURGERY PRE OP NOTE      Diagnosis: left knee septic arthritis    Planned Procedure: arthroscopic irrigation and debridement left knee    Consent: TO BE OBTAINED BY ATTENDING                   Risks/benefits/alternatives were discussed with the patient/family and they wish to proceed with surgery.       ANTICIPATED DATE OF PROCEDURE : 5/27/21  SCHEDULED CASE OR ADD-ON CASE:       Consent:     Clearances:   [ ] Medicine: d/w dr Anne [phone]  [ ] Other: cardio, spoke with Cedric Sevilla, cardio [phone]    T(C): 35.8 (05-26-21 @ 12:14), Max: 37.4 (05-26-21 @ 05:18)  HR: 71 (05-26-21 @ 12:14) (71 - 83)  BP: 105/58 (05-26-21 @ 12:14) (105/58 - 112/58)  RR: 18 (05-26-21 @ 12:14) (18 - 18)  SpO2: --    Labs:                        7.9    9.75  )-----------( 511      ( 26 May 2021 06:24 )             26.3     05-26    133<L>  |  100  |  17  ----------------------------<  66<L>  5.1<H>   |  27  |  0.9    Ca    8.3<L>      26 May 2021 06:24    TPro  6.3  /  Alb  2.7<L>  /  TBili  0.4  /  DBili  x   /  AST  17  /  ALT  11  /  AlkPhos  86  05-26      Type and Screen X 2:    COVID-19 PCR: NotDetec (25 May 2021 09:04)  COVID-19 PCR: NotDetec (02 May 2021 08:52)  COVID-19 PCR: Detected (29 Apr 2021 14:33)  COVID-19 PCR: NotDetec (26 Apr 2021 06:00)  COVID-19 PCR: NotDetec (12 Mar 2021 11:00)  COVID-19 PCR: Detected (05 Feb 2021 13:47)    [ ]Pregnancy test ( if female of childbearing age) : ***  [ ]EKG:   [ ]CXR:       DIET: NPO after midnight  IVF: per primary team      ANTICOAGULATION STATUS ( include name of anticoagulant) :  [***] CONTINUE (**name of anticoagulant)      hold lovenox on 5/27/21                                           A/P: Patient is a 65y y/o Male Pending  arthroscopic irrigation and debridement left knee tomorrow    [x ] -NPO and IVF @ midnight  [ x]pain control/analgesia prn per primary team   [ x]Incentive Spirometry   [ ]F/U Clearance  [ ] 1 unit PRBC  [ ]F/U Pending Labs, f/u K, posttransfusion cbc  [ ]Notify Ortho with any questions- spectra [id number]    [ x]DISCUSSED WITH PRIMARY TEAM MEMBER (name of team member)dr Anne, [phone]  [x ]Date and Time DISCUSSED WITH PRIMARY TEAM MEMBER: 5/26/21 14:30 ORTHOPEDIC SURGERY PRE OP NOTE      Diagnosis: left knee septic arthritis    Planned Procedure: arthroscopic irrigation and debridement left knee    Consent: TO BE OBTAINED BY ATTENDING                   Risks/benefits/alternatives were discussed with the patient/family and they wish to proceed with surgery.       ANTICIPATED DATE OF PROCEDURE : 5/27/21  SCHEDULED CASE OR ADD-ON CASE:       Consent:     Clearances:   [ ] Medicine: d/w dr Anne [phone]  [ ] Other: cardio, spoke with Cedric Sevilla, cardio [phone]    T(C): 35.8 (05-26-21 @ 12:14), Max: 37.4 (05-26-21 @ 05:18)  HR: 71 (05-26-21 @ 12:14) (71 - 83)  BP: 105/58 (05-26-21 @ 12:14) (105/58 - 112/58)  RR: 18 (05-26-21 @ 12:14) (18 - 18)  SpO2: --    Labs:                        7.9    9.75  )-----------( 511      ( 26 May 2021 06:24 )             26.3     05-26    133<L>  |  100  |  17  ----------------------------<  66<L>  5.1<H>   |  27  |  0.9    Ca    8.3<L>      26 May 2021 06:24    TPro  6.3  /  Alb  2.7<L>  /  TBili  0.4  /  DBili  x   /  AST  17  /  ALT  11  /  AlkPhos  86  05-26      Type and Screen X 2:    COVID-19 PCR: NotDetec (25 May 2021 09:04)  COVID-19 PCR: NotDetec (02 May 2021 08:52)  COVID-19 PCR: Detected (29 Apr 2021 14:33)  COVID-19 PCR: NotDetec (26 Apr 2021 06:00)  COVID-19 PCR: NotDetec (12 Mar 2021 11:00)  COVID-19 PCR: Detected (05 Feb 2021 13:47)    [ ]Pregnancy test ( if female of childbearing age) : ***  [ ]EKG:   [ ]CXR:       DIET: NPO after midnight  IVF: per primary team      ANTICOAGULATION STATUS ( include name of anticoagulant) :  [***] CONTINUE (**name of anticoagulant)      hold lovenox on 5/27/21   d/c effient before procedure                                           A/P: Patient is a 65y y/o Male Pending  arthroscopic irrigation and debridement left knee tomorrow    [x ] -NPO and IVF @ midnight  [ x]pain control/analgesia prn per primary team   [ x]Incentive Spirometry   [ ]F/U Clearance  [ ] 1 unit PRBC  [ ]F/U Pending Labs, f/u K, posttransfusion cbc  [ ]Notify Ortho with any questions- spectra [id number]    [ x]DISCUSSED WITH PRIMARY TEAM MEMBER (name of team member)dr Anne, [phone]  [x ]Date and Time DISCUSSED WITH PRIMARY TEAM MEMBER: 5/26/21 14:30

## 2021-05-26 NOTE — PROGRESS NOTE ADULT - SUBJECTIVE AND OBJECTIVE BOX
Chief Complaint: B/L knee pain    Patient seen and examined at bedside    Pt currently complaining of bilateral knee pain, L>R. The pain in the L knee is severe, constant, made worse with movement, sharp with movement, but a constant pressure/stretching sensation in the L knee. Severe, never had pain like this before, the pain medicatiosn have been helping only occasionally. Has also been having muscle spasms. Pt is going for L knee wash out tomorrow.     PAST MEDICAL & SURGICAL HISTORY:  Status post percutaneous transluminal coronary angioplasty  2 stents    Atherosclerosis of coronary artery  CAD (coronary artery disease)    Osteoarthritis    HLD (hyperlipidemia)    Blood clot due to device, implant, or graft  was on blood thinners    Diabetes  on insulin pump    HTN (hypertension)    CHF (congestive heart failure)  EF ~ 25%    History of celiac disease    Diverticulitis    STEMI (ST elevation myocardial infarction)    Diabetic neuropathy    Anxiety and depression    Other postprocedural status  Fixation hardware in foot LEFT    Stented coronary artery  10/18 heart attack  INFERIOR WALL MI    S/P CABG x 1  2018    S/P TKR (total knee replacement), right  with infection Mrsa   per pt he was cleared from MRSA infection    Surgery, elective  right knee wound debridement    History of open reduction and internal fixation (ORIF) procedure    H/O shoulder surgery  right    AICD (automatic cardioverter/defibrillator) present    Cholecystostomy care  drain inserted 2020 &amp; removed 4 months ago    History of tonsillectomy    History of hip replacement, total, right        SOCIAL HISTORY:  [ ] Denies Smoking, Alcohol, or Drug Use    Allergies    ACE inhibitors (Hives)  carvedilol (Other)  enalapril (Hives)  Entresto (Other)    Intolerances        PAIN MEDICATIONS:  acetaminophen   Tablet .. 650 milliGRAM(s) Oral every 6 hours PRN  baclofen 5 milliGRAM(s) Oral every 8 hours PRN  DULoxetine 90 milliGRAM(s) Oral daily  gabapentin 300 milliGRAM(s) Oral three times a day  melatonin 10 milliGRAM(s) Oral at bedtime PRN  morphine  - Injectable 4 milliGRAM(s) IV Push every 4 hours PRN  oxyCODONE    IR 5 milliGRAM(s) Oral every 4 hours PRN  oxyCODONE    IR 10 milliGRAM(s) Oral every 4 hours PRN    Heme:  aspirin enteric coated 81 milliGRAM(s) Oral daily  enoxaparin Injectable 40 milliGRAM(s) SubCutaneous daily  prasugrel 10 milliGRAM(s) Oral daily    Antibiotics:  nafcillin  IVPB 2 Gram(s) IV Intermittent every 4 hours  nafcillin  IVPB        Cardiac:  digoxin     Tablet 125 MICROGram(s) Oral daily  furosemide    Tablet 40 milliGRAM(s) Oral daily PRN  metoprolol succinate ER 25 milliGRAM(s) Oral daily  spironolactone 25 milliGRAM(s) Oral daily    Pulmonary:    Endocrine  atorvastatin 80 milliGRAM(s) Oral at bedtime  dextrose 40% Gel 15 Gram(s) Oral once  dextrose 50% Injectable 25 Gram(s) IV Push once  dextrose 50% Injectable 12.5 Gram(s) IV Push once  dextrose 50% Injectable 25 Gram(s) IV Push once  dextrose 50% Injectable 50 milliLiter(s) IV Push once  glucagon  Injectable 1 milliGRAM(s) IntraMuscular once  insulin glargine Injectable (LANTUS) 25 Unit(s) SubCutaneous at bedtime  insulin lispro (ADMELOG) corrective regimen sliding scale   SubCutaneous three times a day before meals  insulin regular  human recombinant 10 Unit(s) IV Push once    GI:  pantoprazole    Tablet 40 milliGRAM(s) Oral before breakfast  polyethylene glycol 3350 17 Gram(s) Oral two times a day  senna 2 Tablet(s) Oral at bedtime    All Other Meds:  ascorbic acid 500 milliGRAM(s) Oral daily  chlorhexidine 4% Liquid 1 Application(s) Topical <User Schedule>  dextrose 5%. 1000 milliLiter(s) IV Continuous <Continuous>  dextrose 5%. 1000 milliLiter(s) IV Continuous <Continuous>  multivitamin/minerals 1 Tablet(s) Oral daily  nystatin Cream 1 Application(s) Topical two times a day  sodium zirconium cyclosilicate 10 Gram(s) Oral once      REVIEW OF SYSTEMS:  CONSTITUTIONAL: Negative fever,chills  NECK: No pain or stiffness  RESPIRATORY: No cough, wheezing,  No shortness of breath  GASTROINTESTINAL: No abdominal, No nausea, vomiting; No diarrhea or constipation.   GENITOURINARY: No dysuria, frequency, or incontinence  NEUROLOGICAL: No headaches, memory loss, loss of strength, numbness, or  SKIN: No itching, burning, rashes, or lesions   MUSCULOSKELETAL: + B/L knee pain, L>R    PHYSICAL EXAM:    Vital Signs Last 24 Hrs  T(C): 37.4 (26 May 2021 05:18), Max: 37.4 (26 May 2021 05:18)  T(F): 99.3 (26 May 2021 05:18), Max: 99.3 (26 May 2021 05:18)  HR: 83 (26 May 2021 05:18) (80 - 83)  BP: 111/58 (26 May 2021 05:18) (111/58 - 130/60)  BP(mean): --  RR: 18 (26 May 2021 05:18) (18 - 18)  SpO2: --    PAIN SCORE:       9  SCALE USED: (1-10 VNRS)       GENERAL: Comfortable, appears in pain  HEENT:  Atraumatic, Normocephalic, PERRL, EOMI  NECK: Supple  LUNG: Respiration even and unlabored, no distress noted  ABDOMEN: Soft, Nontender, Nondistended  BACK: No midline bony tenderness to palpation, no step off or deformity noted.   EXTREMITIES:  L knee swollen, would L shin, R knee swollen with healing incisions, R shin bandage  NEUROLOGIC: Awake, alert and oriented X3;  No focal deficits. Moving all 4 extremities Sensation intact to light touch  SKIN: Warm and Dry    LABS:                          7.9    9.75  )-----------( 511      ( 26 May 2021 06:24 )             26.3     05-26    133<L>  |  100  |  17  ----------------------------<  66<L>  5.1<H>   |  27  |  0.9    Ca    8.3<L>      26 May 2021 06:24    TPro  6.3  /  Alb  2.7<L>  /  TBili  0.4  /  DBili  x   /  AST  17  /  ALT  11  /  AlkPhos  86  05-26          Drug Screen:        RADIOLOGY:      [ ]  [place]  Reviewed and Copied to Chart

## 2021-05-26 NOTE — PROGRESS NOTE ADULT - ASSESSMENT
Gave lokelma 10 grams once  Change bedtime lantus from 25 units to 10 units  Dextrose 25 grams once  potassium repeat at 4 pm Gave lokelma 10 grams once  Change bedtime lantus from 25 units to 10 units  Dextrose 25 grams once  potassium repeat at 4 pm  Biliary draianage with pseudomonas. ID on board but off today. On Nafcillin which doesnt cover thus will augment with  A/P:    A/P: 64 yo male admitted with left septic knee    --Left septic knee with synovitis: On Nafcillin. MRI Positive. Ortho on board and plan is for I & D in am. NPO after midnight. Pain control  --MSSA Bacteremia: On Nafcillin.   --Pseudomonas Aeruginosa infection: From the biliary drainage site. Will add Meropenem and will follow ID rec. Awaiting surgery reconsult  --Hyperkalemia: 5.1. Lokelma 10 grams once. Repeat level at 1600  --DM2: Hypoglycemic. Will chaqnge lantus from 25 units QHS to 10 units. Will give 25 grams of dextrose once . Glucose monitoring   --Anemia: Iron  def combined with ACD  --CAD: ASA, statin, toprol and Prasugrel  --CHF with reduced EF: Aldactone and Lasix  --Covid vaccine status: He has received one dose and is supposed to receive the second dose on 5/15/21 but he was already hospitalized. ID on board and will appreciate their input on that.    --DVT ppx: Lovenox, prasugrel and ASA  --Dispo: Pending I & D in am, ID clearance with respect to abx stewardship for MSSA and psuedomonas aeruginosa infection as well as surgery reconsult for cholecystectomy                        Gave lokelma 10 grams once  Change bedtime lantus from 25 units to 10 units  Dextrose 25 grams once  potassium repeat at 4 pm  Biliary draianage with pseudomonas. ID on board but off today. On Nafcillin which doesnt cover thus will augment with  A/P:    A/P: 66 yo male admitted with left septic knee    --Left septic knee with synovitis: On Nafcillin. MRI Positive. Ortho on board and plan is for I & D in am. NPO after midnight. Pain control. Cardiology (Dr Leslie Steele's office contact for cardiac clearance on patient for am surgery). Typed and crossmatched. IT consulted for midline placement  --MSSA Bacteremia: On Nafcillin which will be dc'ed now since he will be started on Meropenem for pseudomonas infection. Meropenem covers both pseudomonas and MSSA  --Pseudomonas Aeruginosa infection: From the biliary drainage site. Will start  Meropenem and DC Nafcillin Will follow ID rec. Awaiting surgery reconsult  --Hyperkalemia: 5.1. Lokelma 10 grams once. Repeat level at 1600  --DM2: Hypoglycemic. Will change Lantus from 25 units QHS to 10 units. Will give 25 grams of dextrose once . Glucose monitoring   --Anemia: Iron  def combined with ACD  --CAD: ASA, statin, Toprol and Prasugrel  --CHF with reduced EF: Aldactone and Lasix  --Covid vaccine status: He has received one dose and is supposed to receive the second dose on 5/15/21 but he was already hospitalized. ID on board and will appreciate their input on that.    --DVT ppx: Lovenox, prasugrel and ASA  --Dispo: Pending I & D in am, ID clearance with respect to abx stewardship for MSSA and psuedomonas aeruginosa infection as well as surgery reconsult for cholecystectomy                        Gave lokelma 10 grams once  Change bedtime lantus from 25 units to 10 units  Dextrose 25 grams once  potassium repeat at 4 pm  Biliary draianage with pseudomonas. ID on board but off today. On Nafcillin which doesnt cover thus will augment with  A/P:    A/P: 66 yo male admitted with left septic knee    --Left septic knee with synovitis: On Nafcillin. MRI Positive. Ortho on board and plan is for I & D in am. NPO after midnight. Pain control. Cardiology (Dr Leslie Steele's office contact for cardiac clearance on patient for am surgery). Typed and crossmatched. IT consulted for midline placement  --MSSA Bacteremia: On Nafcillin which will be dc'ed now since he will be started on Meropenem for pseudomonas infection. Meropenem covers both pseudomonas and MSSA  --Pseudomonas Aeruginosa infection: From the biliary drainage site. Will start  Meropenem and DC Nafcillin Will follow ID rec. Awaiting surgery reconsult  --Hyperkalemia: 5.1. Lokelma 10 grams once. Repeat level at 1600  --DM2: Hypoglycemic. Will change Lantus from 25 units QHS to 10 units. Will give 25 grams of dextrose once . Glucose monitoring   --Anemia: Iron  def combined with ACD  --CAD: ASA, statin, Toprol and Prasugrel  --CHF with reduced EF: Aldactone and Lasix  --Covid vaccine status: He has received one dose and is supposed to receive the second dose on 5/15/21 but he was already hospitalized. ID on board and will appreciate their input on that.    --DVT ppx: Lovenox, prasugrel and ASA  --Dispo: Pending I & D in am, ID clearance with respect to abx stewardship for MSSA and psuedomonas aeruginosa infection as well as surgery reconsult for cholecystectomy

## 2021-05-26 NOTE — PRE-ANESTHESIA EVALUATION ADULT - NSDENTALSD_ENT_ALL_CORE
appears normal and intact
missing teeth
caps/bridge/implants/missing teeth
appears normal and intact

## 2021-05-26 NOTE — PRE-ANESTHESIA EVALUATION ADULT - NSANTHADDINFOFT_GEN_ALL_CORE
Pt has h/o CAD, CHF with EF of 20% and needs to get cardiac risk assessment note for the procedure. Please optimize his Hb level. Pt has h/o CAD, CHF with EF of 20% and needs to get cardiac risk assessment note for the procedure. Please optimize his Hb level. D/W with the primary team attending.

## 2021-05-26 NOTE — PRE-ANESTHESIA EVALUATION ADULT - MALLAMPATI CLASS
Class III - visualization of the soft palate and the base of the uvula
Class II - visualization of the soft palate, fauces, and uvula
Class III - visualization of the soft palate and the base of the uvula
Class III - visualization of the soft palate and the base of the uvula

## 2021-05-27 LAB
ALBUMIN SERPL ELPH-MCNC: 2.8 G/DL — LOW (ref 3.5–5.2)
ALP SERPL-CCNC: 82 U/L — SIGNIFICANT CHANGE UP (ref 30–115)
ALT FLD-CCNC: 11 U/L — SIGNIFICANT CHANGE UP (ref 0–41)
ANION GAP SERPL CALC-SCNC: 9 MMOL/L — SIGNIFICANT CHANGE UP (ref 7–14)
APTT BLD: 32.3 SEC — SIGNIFICANT CHANGE UP (ref 27–39.2)
AST SERPL-CCNC: 14 U/L — SIGNIFICANT CHANGE UP (ref 0–41)
BILIRUB SERPL-MCNC: 0.7 MG/DL — SIGNIFICANT CHANGE UP (ref 0.2–1.2)
BUN SERPL-MCNC: 17 MG/DL — SIGNIFICANT CHANGE UP (ref 10–20)
CALCIUM SERPL-MCNC: 8.3 MG/DL — LOW (ref 8.5–10.1)
CHLORIDE SERPL-SCNC: 99 MMOL/L — SIGNIFICANT CHANGE UP (ref 98–110)
CO2 SERPL-SCNC: 27 MMOL/L — SIGNIFICANT CHANGE UP (ref 17–32)
CREAT SERPL-MCNC: 1 MG/DL — SIGNIFICANT CHANGE UP (ref 0.7–1.5)
GLUCOSE BLDC GLUCOMTR-MCNC: 176 MG/DL — HIGH (ref 70–99)
GLUCOSE BLDC GLUCOMTR-MCNC: 190 MG/DL — HIGH (ref 70–99)
GLUCOSE BLDC GLUCOMTR-MCNC: 243 MG/DL — HIGH (ref 70–99)
GLUCOSE BLDC GLUCOMTR-MCNC: 281 MG/DL — HIGH (ref 70–99)
GLUCOSE SERPL-MCNC: 255 MG/DL — HIGH (ref 70–99)
HCT VFR BLD CALC: 30.9 % — LOW (ref 42–52)
HGB BLD-MCNC: 9.3 G/DL — LOW (ref 14–18)
INR BLD: 1.49 RATIO — HIGH (ref 0.65–1.3)
MCHC RBC-ENTMCNC: 22.5 PG — LOW (ref 27–31)
MCHC RBC-ENTMCNC: 30.1 G/DL — LOW (ref 32–37)
MCV RBC AUTO: 74.8 FL — LOW (ref 80–94)
NRBC # BLD: 0 /100 WBCS — SIGNIFICANT CHANGE UP (ref 0–0)
PLATELET # BLD AUTO: 461 K/UL — HIGH (ref 130–400)
POTASSIUM SERPL-MCNC: 4.3 MMOL/L — SIGNIFICANT CHANGE UP (ref 3.5–5)
POTASSIUM SERPL-MCNC: 4.6 MMOL/L — SIGNIFICANT CHANGE UP (ref 3.5–5)
POTASSIUM SERPL-SCNC: 4.3 MMOL/L — SIGNIFICANT CHANGE UP (ref 3.5–5)
POTASSIUM SERPL-SCNC: 4.6 MMOL/L — SIGNIFICANT CHANGE UP (ref 3.5–5)
PROT SERPL-MCNC: 6.7 G/DL — SIGNIFICANT CHANGE UP (ref 6–8)
PROTHROM AB SERPL-ACNC: 17.1 SEC — HIGH (ref 9.95–12.87)
RBC # BLD: 4.13 M/UL — LOW (ref 4.7–6.1)
RBC # FLD: 25 % — HIGH (ref 11.5–14.5)
SODIUM SERPL-SCNC: 135 MMOL/L — SIGNIFICANT CHANGE UP (ref 135–146)
WBC # BLD: 7.38 K/UL — SIGNIFICANT CHANGE UP (ref 4.8–10.8)
WBC # FLD AUTO: 7.38 K/UL — SIGNIFICANT CHANGE UP (ref 4.8–10.8)

## 2021-05-27 PROCEDURE — 99232 SBSQ HOSP IP/OBS MODERATE 35: CPT

## 2021-05-27 RX ORDER — HYDROMORPHONE HYDROCHLORIDE 2 MG/ML
1 INJECTION INTRAMUSCULAR; INTRAVENOUS; SUBCUTANEOUS
Refills: 0 | Status: DISCONTINUED | OUTPATIENT
Start: 2021-05-27 | End: 2021-05-27

## 2021-05-27 RX ORDER — GABAPENTIN 400 MG/1
600 CAPSULE ORAL THREE TIMES A DAY
Refills: 0 | Status: DISCONTINUED | OUTPATIENT
Start: 2021-05-27 | End: 2021-06-15

## 2021-05-27 RX ORDER — INSULIN LISPRO 100/ML
VIAL (ML) SUBCUTANEOUS
Refills: 0 | Status: DISCONTINUED | OUTPATIENT
Start: 2021-05-27 | End: 2021-06-01

## 2021-05-27 RX ORDER — HYDROMORPHONE HYDROCHLORIDE 2 MG/ML
0.5 INJECTION INTRAMUSCULAR; INTRAVENOUS; SUBCUTANEOUS
Refills: 0 | Status: DISCONTINUED | OUTPATIENT
Start: 2021-05-27 | End: 2021-05-27

## 2021-05-27 RX ORDER — SODIUM CHLORIDE 9 MG/ML
1000 INJECTION, SOLUTION INTRAVENOUS
Refills: 0 | Status: DISCONTINUED | OUTPATIENT
Start: 2021-05-27 | End: 2021-06-15

## 2021-05-27 RX ORDER — OXYCODONE HYDROCHLORIDE 5 MG/1
5 TABLET ORAL EVERY 4 HOURS
Refills: 0 | Status: DISCONTINUED | OUTPATIENT
Start: 2021-05-27 | End: 2021-06-03

## 2021-05-27 RX ORDER — INSULIN GLARGINE 100 [IU]/ML
10 INJECTION, SOLUTION SUBCUTANEOUS EVERY MORNING
Refills: 0 | Status: DISCONTINUED | OUTPATIENT
Start: 2021-05-27 | End: 2021-05-28

## 2021-05-27 RX ORDER — SODIUM CHLORIDE 9 MG/ML
1000 INJECTION, SOLUTION INTRAVENOUS
Refills: 0 | Status: DISCONTINUED | OUTPATIENT
Start: 2021-05-27 | End: 2021-05-27

## 2021-05-27 RX ORDER — OXYCODONE HYDROCHLORIDE 5 MG/1
10 TABLET ORAL EVERY 12 HOURS
Refills: 0 | Status: DISCONTINUED | OUTPATIENT
Start: 2021-05-27 | End: 2021-05-27

## 2021-05-27 RX ORDER — ACETAMINOPHEN 500 MG
650 TABLET ORAL EVERY 8 HOURS
Refills: 0 | Status: DISCONTINUED | OUTPATIENT
Start: 2021-05-28 | End: 2021-06-08

## 2021-05-27 RX ORDER — DEXTROSE 50 % IN WATER 50 %
25 SYRINGE (ML) INTRAVENOUS ONCE
Refills: 0 | Status: DISCONTINUED | OUTPATIENT
Start: 2021-05-27 | End: 2021-06-15

## 2021-05-27 RX ORDER — CYCLOBENZAPRINE HYDROCHLORIDE 10 MG/1
5 TABLET, FILM COATED ORAL
Refills: 0 | Status: DISCONTINUED | OUTPATIENT
Start: 2021-05-27 | End: 2021-06-08

## 2021-05-27 RX ORDER — ENOXAPARIN SODIUM 100 MG/ML
40 INJECTION SUBCUTANEOUS DAILY
Refills: 0 | Status: DISCONTINUED | OUTPATIENT
Start: 2021-05-27 | End: 2021-05-27

## 2021-05-27 RX ORDER — ASPIRIN/CALCIUM CARB/MAGNESIUM 324 MG
81 TABLET ORAL DAILY
Refills: 0 | Status: DISCONTINUED | OUTPATIENT
Start: 2021-05-27 | End: 2021-06-15

## 2021-05-27 RX ORDER — MORPHINE SULFATE 50 MG/1
2 CAPSULE, EXTENDED RELEASE ORAL ONCE
Refills: 0 | Status: DISCONTINUED | OUTPATIENT
Start: 2021-05-27 | End: 2021-05-27

## 2021-05-27 RX ORDER — DEXTROSE 50 % IN WATER 50 %
15 SYRINGE (ML) INTRAVENOUS ONCE
Refills: 0 | Status: DISCONTINUED | OUTPATIENT
Start: 2021-05-27 | End: 2021-06-15

## 2021-05-27 RX ORDER — OXYCODONE HYDROCHLORIDE 5 MG/1
10 TABLET ORAL EVERY 12 HOURS
Refills: 0 | Status: DISCONTINUED | OUTPATIENT
Start: 2021-05-27 | End: 2021-05-29

## 2021-05-27 RX ORDER — OXYCODONE HYDROCHLORIDE 5 MG/1
15 TABLET ORAL EVERY 12 HOURS
Refills: 0 | Status: DISCONTINUED | OUTPATIENT
Start: 2021-05-27 | End: 2021-05-27

## 2021-05-27 RX ORDER — ACETAMINOPHEN 500 MG
650 TABLET ORAL ONCE
Refills: 0 | Status: COMPLETED | OUTPATIENT
Start: 2021-05-27 | End: 2021-05-27

## 2021-05-27 RX ORDER — ENOXAPARIN SODIUM 100 MG/ML
40 INJECTION SUBCUTANEOUS DAILY
Refills: 0 | Status: DISCONTINUED | OUTPATIENT
Start: 2021-05-27 | End: 2021-06-15

## 2021-05-27 RX ORDER — MORPHINE SULFATE 50 MG/1
4 CAPSULE, EXTENDED RELEASE ORAL ONCE
Refills: 0 | Status: DISCONTINUED | OUTPATIENT
Start: 2021-05-27 | End: 2021-05-27

## 2021-05-27 RX ORDER — MEROPENEM 1 G/30ML
2 INJECTION INTRAVENOUS EVERY 8 HOURS
Refills: 0 | Status: DISCONTINUED | OUTPATIENT
Start: 2021-05-27 | End: 2021-05-27

## 2021-05-27 RX ORDER — NAFCILLIN 10 G/100ML
2 INJECTION, POWDER, FOR SOLUTION INTRAVENOUS EVERY 4 HOURS
Refills: 0 | Status: DISCONTINUED | OUTPATIENT
Start: 2021-05-27 | End: 2021-06-15

## 2021-05-27 RX ORDER — PRASUGREL 5 MG/1
10 TABLET, FILM COATED ORAL DAILY
Refills: 0 | Status: DISCONTINUED | OUTPATIENT
Start: 2021-05-27 | End: 2021-06-15

## 2021-05-27 RX ORDER — DULOXETINE HYDROCHLORIDE 30 MG/1
90 CAPSULE, DELAYED RELEASE ORAL DAILY
Refills: 0 | Status: DISCONTINUED | OUTPATIENT
Start: 2021-05-27 | End: 2021-06-15

## 2021-05-27 RX ORDER — GLUCAGON INJECTION, SOLUTION 0.5 MG/.1ML
1 INJECTION, SOLUTION SUBCUTANEOUS ONCE
Refills: 0 | Status: DISCONTINUED | OUTPATIENT
Start: 2021-05-27 | End: 2021-06-15

## 2021-05-27 RX ORDER — ACETAMINOPHEN 500 MG
650 TABLET ORAL EVERY 6 HOURS
Refills: 0 | Status: DISCONTINUED | OUTPATIENT
Start: 2021-05-27 | End: 2021-05-27

## 2021-05-27 RX ORDER — MEPERIDINE HYDROCHLORIDE 50 MG/ML
12.5 INJECTION INTRAMUSCULAR; INTRAVENOUS; SUBCUTANEOUS
Refills: 0 | Status: DISCONTINUED | OUTPATIENT
Start: 2021-05-27 | End: 2021-05-27

## 2021-05-27 RX ORDER — DEXTROSE 50 % IN WATER 50 %
12.5 SYRINGE (ML) INTRAVENOUS ONCE
Refills: 0 | Status: DISCONTINUED | OUTPATIENT
Start: 2021-05-27 | End: 2021-06-15

## 2021-05-27 RX ORDER — ONDANSETRON 8 MG/1
4 TABLET, FILM COATED ORAL ONCE
Refills: 0 | Status: DISCONTINUED | OUTPATIENT
Start: 2021-05-27 | End: 2021-05-27

## 2021-05-27 RX ADMIN — NAFCILLIN 200 GRAM(S): 10 INJECTION, POWDER, FOR SOLUTION INTRAVENOUS at 22:48

## 2021-05-27 RX ADMIN — OXYCODONE HYDROCHLORIDE 10 MILLIGRAM(S): 5 TABLET ORAL at 18:52

## 2021-05-27 RX ADMIN — OXYCODONE HYDROCHLORIDE 10 MILLIGRAM(S): 5 TABLET ORAL at 20:30

## 2021-05-27 RX ADMIN — NAFCILLIN 200 GRAM(S): 10 INJECTION, POWDER, FOR SOLUTION INTRAVENOUS at 01:15

## 2021-05-27 RX ADMIN — ENOXAPARIN SODIUM 40 MILLIGRAM(S): 100 INJECTION SUBCUTANEOUS at 18:16

## 2021-05-27 RX ADMIN — NAFCILLIN 200 GRAM(S): 10 INJECTION, POWDER, FOR SOLUTION INTRAVENOUS at 05:03

## 2021-05-27 RX ADMIN — Medication 125 MICROGRAM(S): at 05:03

## 2021-05-27 RX ADMIN — Medication 1: at 18:40

## 2021-05-27 RX ADMIN — Medication 650 MILLIGRAM(S): at 22:12

## 2021-05-27 RX ADMIN — Medication 650 MILLIGRAM(S): at 19:01

## 2021-05-27 RX ADMIN — SPIRONOLACTONE 25 MILLIGRAM(S): 25 TABLET, FILM COATED ORAL at 05:03

## 2021-05-27 RX ADMIN — MORPHINE SULFATE 4 MILLIGRAM(S): 50 CAPSULE, EXTENDED RELEASE ORAL at 05:20

## 2021-05-27 RX ADMIN — Medication 25 MILLIGRAM(S): at 05:04

## 2021-05-27 RX ADMIN — MORPHINE SULFATE 4 MILLIGRAM(S): 50 CAPSULE, EXTENDED RELEASE ORAL at 05:03

## 2021-05-27 RX ADMIN — MEROPENEM 200 MILLIGRAM(S): 1 INJECTION INTRAVENOUS at 01:02

## 2021-05-27 RX ADMIN — MORPHINE SULFATE 4 MILLIGRAM(S): 50 CAPSULE, EXTENDED RELEASE ORAL at 15:00

## 2021-05-27 RX ADMIN — HYDROMORPHONE HYDROCHLORIDE 0.5 MILLIGRAM(S): 2 INJECTION INTRAMUSCULAR; INTRAVENOUS; SUBCUTANEOUS at 11:39

## 2021-05-27 RX ADMIN — CHLORHEXIDINE GLUCONATE 1 APPLICATION(S): 213 SOLUTION TOPICAL at 05:03

## 2021-05-27 RX ADMIN — Medication 81 MILLIGRAM(S): at 18:16

## 2021-05-27 RX ADMIN — SODIUM CHLORIDE 75 MILLILITER(S): 9 INJECTION, SOLUTION INTRAVENOUS at 10:35

## 2021-05-27 RX ADMIN — HYDROMORPHONE HYDROCHLORIDE 0.5 MILLIGRAM(S): 2 INJECTION INTRAMUSCULAR; INTRAVENOUS; SUBCUTANEOUS at 11:50

## 2021-05-27 RX ADMIN — MEROPENEM 100 MILLIGRAM(S): 1 INJECTION INTRAVENOUS at 15:53

## 2021-05-27 RX ADMIN — MORPHINE SULFATE 2 MILLIGRAM(S): 50 CAPSULE, EXTENDED RELEASE ORAL at 23:58

## 2021-05-27 RX ADMIN — NYSTATIN CREAM 1 APPLICATION(S): 100000 CREAM TOPICAL at 05:05

## 2021-05-27 RX ADMIN — GABAPENTIN 300 MILLIGRAM(S): 400 CAPSULE ORAL at 05:03

## 2021-05-27 RX ADMIN — OXYCODONE HYDROCHLORIDE 5 MILLIGRAM(S): 5 TABLET ORAL at 21:11

## 2021-05-27 RX ADMIN — GABAPENTIN 600 MILLIGRAM(S): 400 CAPSULE ORAL at 22:48

## 2021-05-27 RX ADMIN — PRASUGREL 10 MILLIGRAM(S): 5 TABLET, FILM COATED ORAL at 18:16

## 2021-05-27 RX ADMIN — MORPHINE SULFATE 4 MILLIGRAM(S): 50 CAPSULE, EXTENDED RELEASE ORAL at 14:25

## 2021-05-27 RX ADMIN — DULOXETINE HYDROCHLORIDE 90 MILLIGRAM(S): 30 CAPSULE, DELAYED RELEASE ORAL at 18:16

## 2021-05-27 NOTE — PROGRESS NOTE ADULT - ASSESSMENT
Discussed with ID and medicine team  No gross purulence found in the joint during arthroscopy, mostly hematoma, possibly infected  Suspect vasculopathy/ poor antibiotic delivery as a factor for lingering infection  Strongly recommend vascular consult for evaluation/ possible re-vascularization  Will follow

## 2021-05-27 NOTE — BRIEF OPERATIVE NOTE - NSICDXBRIEFPREOP_GEN_ALL_CORE_FT
PRE-OP DIAGNOSIS:  Septic arthritis of knee, left 26-Apr-2021 09:48:37  Aris Ingram  
PRE-OP DIAGNOSIS:  Septic arthritis of knee, left 26-Apr-2021 09:48:37  Aris Ingram

## 2021-05-27 NOTE — PROVIDER CONTACT NOTE (OTHER) - ACTION/TREATMENT ORDERED:
Will come to bedside.
PA will come redress.
As per MD no insulin at this time due to pt being NPO
MD Torrez notified and made aware. as per MD, please continue to monitor pt throughout the night.
MD states he will order medication.
Team will use ultrasound

## 2021-05-27 NOTE — PROGRESS NOTE ADULT - ASSESSMENT
A/P:    A/P: 64 yo male admitted with left septic knee    --Left septic knee with synovitis: S/P left knee wash out today by ortho which is well appreciated. Ortho recommended bilateral femoral stent placement and thus vascular consult since patient has barely any perfusion to the knee and his wash out was done without much bleeding. Note that his EF is 15%. On Meropenem for pseudomonas but ID rec resuming Nafcillin since the biliary drainage is most likely colonization. Will dc Meropenem and resume Nafcillin. Once cleared by ortho, will order midline for prolonged abx adminsitration  --MSSA Bacteremia: On Nafcillin . Will continue per ID Rec.   --Pseudomonas Aeruginosa infection: From the biliary drainage site. Likely colonization  --Hyperkalemia: Corrected  --DM2: Continue current regimen  --Anemia: Iron  def combined with ACD  --CAD: ASA, statin, Toprol and Prasugrel  --CHF with reduced EF: Aldactone and Lasix  --Covid vaccine status: He has received one dose and is supposed to receive the second dose on 5/15/21 but he was already hospitalized. ID on board and will appreciate their input on that.    --DVT ppx: Lovenox, prasugrel and ASA      #Progress Note Handoff: Pending ortho clearance and vascular consult. Midline placement for prolonged abx administration. Will reconsult PT    Pending (specify):  Consults_____Vascular____, Tests________, Test Results_______, Other_________  Family discussion:  Disposition: Home___/SNF___/Other________/Unknown at this time________

## 2021-05-27 NOTE — BRIEF OPERATIVE NOTE - NSICDXBRIEFPROCEDURE_GEN_ALL_CORE_FT
PROCEDURES:  Arthroscopic drainage of knee 26-Apr-2021 09:48:20  Aris Ingram  
PROCEDURES:  Arthroscopic drainage of knee 26-Apr-2021 09:48:20  Aris Ingram

## 2021-05-27 NOTE — PROGRESS NOTE ADULT - SUBJECTIVE AND OBJECTIVE BOX
Vital Signs Last 24 Hrs  T(C): 38.1 (27 May 2021 19:41), Max: 38.4 (27 May 2021 18:21)  T(F): 100.5 (27 May 2021 19:41), Max: 101.1 (27 May 2021 18:21)  HR: 93 (27 May 2021 19:41) (70 - 95)  BP: 123/60 (27 May 2021 19:41) (102/51 - 134/70)  BP(mean): --  RR: 19 (27 May 2021 18:21) (12 - 20)  SpO2: 98% (27 May 2021 12:05) (96% - 100%)Sub: Patient was endorsed doing ok except for pain     OBJ:         LABS:                       05-27    135  |  99  |  17  ----------------------------<  255<H>  4.3   |  27  |  1.0    Ca    8.3<L>      27 May 2021 05:54    TPro  6.7  /  Alb  2.8<L>  /  TBili  0.7  /  DBili  x   /  AST  14  /  ALT  11  /  AlkPhos  82  05-27                        9.3    7.38  )-----------( 461      ( 27 May 2021 05:54 )             30.9     PHYSICAL EXAM:  Gen: NAD, resting in bed  HEENT: Normocephalic, atraumatic  Neck: supple, no lymphadenopathy  CV: Regular rate & regular rhythm  Lungs: decreased BS at bases, no fremitus  Abdomen: Soft, BS present  Ext: Warm, well wrapped with ACE post surgery   Neuro: non focal, awake  Skin: no rash, no erythema  Lines: no phlebitis

## 2021-05-27 NOTE — PROVIDER CONTACT NOTE (OTHER) - DATE AND TIME:
03-May-2021 10:54
12-May-2021 23:49
28-Apr-2021 18:51
11-May-2021 21:55
27-May-2021 04:46
29-Apr-2021 20:31

## 2021-05-27 NOTE — BRIEF OPERATIVE NOTE - NSICDXBRIEFPOSTOP_GEN_ALL_CORE_FT
POST-OP DIAGNOSIS:  Septic arthritis of knee, left 26-Apr-2021 09:48:53  Aris Ingram  
POST-OP DIAGNOSIS:  Septic arthritis of knee, left 26-Apr-2021 09:48:53  Aris Ingram

## 2021-05-27 NOTE — BRIEF OPERATIVE NOTE - OPERATION/FINDINGS
Purulent fluid in left knee. Significant tricompartmental arthritis. Significant synovitis.
No purulence. Large organized hematoma

## 2021-05-27 NOTE — PROGRESS NOTE ADULT - SUBJECTIVE AND OBJECTIVE BOX
LOIDAFLOWER KLINE  65y, Male  Allergy: ACE inhibitors (Hives)  carvedilol (Other)  enalapril (Hives)  Entresto (Other)      LOS  31d    CHIEF COMPLAINT: Septic arthritis (27 May 2021 11:07)      INTERVAL EVENTS/HPI  - No acute events overnight  - T(F): , Max: 98.3 (05-27-21 @ 10:35)  - s/p OR today   - WBC Count: 7.38 (05-27-21 @ 05:54)  WBC Count: 8.68 (05-26-21 @ 21:07)     - Creatinine, Serum: 1.0 (05-27-21 @ 05:54)  Creatinine, Serum: 0.9 (05-26-21 @ 06:24)       ROS  General: Denies rigors, nightsweats  HEENT: Denies headache, rhinorrhea, sore throat, eye pain  CV: Denies CP, palpitations  PULM: Denies wheezing, hemoptysis  GI: Denies hematemesis, hematochezia, melena  : Denies discharge, hematuria  MSK: Denies arthralgias, myalgias  SKIN: Denies rash, lesions  NEURO: Denies paresthesias, weakness  PSYCH: Denies depression, anxiety    VITALS:  T(F): 98.3, Max: 98.3 (05-27-21 @ 10:35)  HR: 85  BP: 123/63  RR: 20Vital Signs Last 24 Hrs  T(C): 36.8 (27 May 2021 10:35), Max: 36.8 (27 May 2021 10:35)  T(F): 98.3 (27 May 2021 10:35), Max: 98.3 (27 May 2021 10:35)  HR: 85 (27 May 2021 12:05) (70 - 86)  BP: 123/63 (27 May 2021 12:05) (102/51 - 134/70)  BP(mean): --  RR: 20 (27 May 2021 12:05) (12 - 20)  SpO2: 98% (27 May 2021 12:05) (96% - 100%)    PHYSICAL EXAM:  Gen: NAD, resting in bed  HEENT: Normocephalic, atraumatic  Neck: supple, no lymphadenopathy  CV: Regular rate & regular rhythm  Lungs: decreased BS at bases, no fremitus  Abdomen: Soft, BS present  Ext: left LE dressed   Neuro: non focal, awake  Skin: no rash, no erythema  Lines: no phlebitis    FH: Non-contributory  Social Hx: Non-contributory    TESTS & MEASUREMENTS:                        9.3    7.38  )-----------( 461      ( 27 May 2021 05:54 )             30.9     05-27    135  |  99  |  17  ----------------------------<  255<H>  4.3   |  27  |  1.0    Ca    8.3<L>      27 May 2021 05:54    TPro  6.7  /  Alb  2.8<L>  /  TBili  0.7  /  DBili  x   /  AST  14  /  ALT  11  /  AlkPhos  82  05-27    eGFR if Non African American: 80 mL/min/1.73M2 (05-27-21 @ 05:54)  eGFR if African American: 92 mL/min/1.73M2 (05-27-21 @ 05:54)    LIVER FUNCTIONS - ( 27 May 2021 05:54 )  Alb: 2.8 g/dL / Pro: 6.7 g/dL / ALK PHOS: 82 U/L / ALT: 11 U/L / AST: 14 U/L / GGT: x               Culture - Aspirate with Gram Stain (collected 05-24-21 @ 14:00)  Source: Joint Fl None  Preliminary Report (05-25-21 @ 21:33):    Few Pseudomonas aeruginosa  Organism: Pseudomonas aeruginosa (05-26-21 @ 20:37)  Organism: Pseudomonas aeruginosa (05-26-21 @ 20:37)      -  Amikacin: S <=16      -  Aztreonam: S <=4      -  Cefepime: S 8      -  Ceftazidime: S 4      -  Ciprofloxacin: S <=0.25      -  Gentamicin: S 4      -  Imipenem: S 2      -  Levofloxacin: S <=0.5      -  Meropenem: S 2      -  Piperacillin/Tazobactam: S <=8      -  Tobramycin: S <=2      Method Type: YOLIE    Culture - Body Fluid with Gram Stain (collected 05-20-21 @ 16:57)  Source: .Body Fluid Synovial Fluid  Gram Stain (05-21-21 @ 07:48):    No polymorphonuclear leukocytes seen    No organisms seen    by cytocentrifuge  Final Report (05-25-21 @ 17:37):    No growth at 5 days Culture in progress    Culture - Acid Fast - Body Fluid w/Smear (collected 05-11-21 @ 11:54)  Source: .Body Fluid Knee Fluid  Preliminary Report (05-19-21 @ 15:03):    No growth at 1 week.    Culture - Body Fluid with Gram Stain (collected 05-09-21 @ 14:10)  Source: .Body Fluid Synovial Fluid  Gram Stain (05-09-21 @ 23:07):    Numerous polymorphonuclear leukocytes per low power field    No organisms seen per oil power field  Final Report (05-23-21 @ 16:42):    No growth at 14 days.    Culture - Blood (collected 05-04-21 @ 05:33)  Source: .Blood None  Final Report (05-09-21 @ 18:00):    No Growth Final    Culture - Blood (collected 05-03-21 @ 07:47)  Source: .Blood None  Final Report (05-08-21 @ 18:00):    No Growth Final    Culture - Blood (collected 05-02-21 @ 06:11)  Source: .Blood None  Final Report (05-07-21 @ 18:01):    No Growth Final    Culture - Body Fluid with Gram Stain (collected 05-01-21 @ 16:43)  Source: Bile biliary drain  Gram Stain (05-02-21 @ 23:26):    Numerous polymorphonuclear leukocytes per low power field    No organisms seen per oil power field  Final Report (05-07-21 @ 17:49):    Few Pseudomonas aeruginosa    Few Enterococcus faecium (vancomycin resistant)  Organism: Pseudomonas aeruginosa  Enterococcus faecium (vancomycin resistant) (05-07-21 @ 17:49)  Organism: Enterococcus faecium (vancomycin resistant) (05-07-21 @ 17:49)      -  Ampicillin: R >8 Predicts results to ampicillin/sulbactam, amoxacillin-clavulanate and  piperacillin-tazobactam.      -  Daptomycin: SDD 4 The breakpoint for SDD (sensitive dose dependent)is based on a dosage regimen of 8-12 mg/kg administered every 24 h in adults and is intended for serious infections due to E. faecium. Consultation with an infectious diseases specialist is recommended.      -  Levofloxacin: R >4      -  Linezolid: S 1      -  Tetra/Doxy: S <=1      -  Vancomycin: R >16      Method Type: YOLIE  Organism: Pseudomonas aeruginosa (05-07-21 @ 17:49)      -  Amikacin: S <=16      -  Aztreonam: S <=4      -  Cefepime: S <=2      -  Ceftazidime: S 4      -  Ciprofloxacin: S <=0.25      -  Gentamicin: S 4      -  Imipenem: S <=1      -  Levofloxacin: S <=0.5      -  Meropenem: S <=1      -  Piperacillin/Tazobactam: S <=8      -  Tobramycin: S <=2      Method Type: YOLIE    Culture - Blood (collected 05-01-21 @ 06:49)  Source: .Blood None  Final Report (05-06-21 @ 18:00):    No Growth Final    Culture - Blood (collected 05-01-21 @ 06:49)  Source: .Blood None  Final Report (05-06-21 @ 18:00):    No Growth Final    Culture - Blood (collected 04-30-21 @ 17:37)  Source: .Blood None  Final Report (05-05-21 @ 23:00):    No Growth Final    Culture - Blood (collected 04-30-21 @ 11:15)  Source: .Blood None  Final Report (05-05-21 @ 23:00):    No Growth Final    Culture - Blood (collected 04-28-21 @ 08:13)  Source: .Blood Blood  Gram Stain (04-29-21 @ 15:21):    Growth in anaerobic bottle: Gram Positive Cocci in Clusters  Final Report (04-30-21 @ 14:03):    Growth in anaerobic bottle: Staphylococcus aureus    See previous culture [id number]    Culture - Blood (collected 04-28-21 @ 08:13)  Source: .Blood Blood  Gram Stain (04-30-21 @ 02:25):    Growth in anaerobic bottle: Gram Positive Cocci in Clusters  Final Report (04-30-21 @ 20:23):    Growth in anaerobic bottle: Staphylococcus aureus    See previous culture [id number]            INFECTIOUS DISEASES TESTING  COVID-19 PCR: NotDetec (05-25-21 @ 09:04)  COVID-19 PCR: NotDetec (05-02-21 @ 08:52)  COVID-19 PCR: Detected (04-29-21 @ 14:33)  COVID-19 PCR: NotDetec (04-26-21 @ 06:00)  COVID-19 PCR: NotDetec (03-12-21 @ 11:00)  COVID-19 PCR: Detected (02-05-21 @ 13:47)  COVID-19 PCR: NotDetec (10-13-20 @ 09:08)  COVID-19 PCR: NotDetec (10-03-20 @ 19:54)  COVID-19 PCR: NotDetec (09-01-20 @ 20:40)  COVID-19 PCR: NotDetec (07-30-20 @ 07:43)  COVID-19 PCR: NotDetec (07-16-20 @ 19:46)      INFLAMMATORY MARKERS  Sedimentation Rate, Erythrocyte: 127 mm/Hr (05-18-21 @ 06:36)  C-Reactive Protein, Serum: 59 mg/L (05-18-21 @ 06:36)  Sedimentation Rate, Erythrocyte: 106 mm/Hr (05-15-21 @ 08:56)  C-Reactive Protein, Serum: 81 mg/L (05-15-21 @ 08:56)      RADIOLOGY & ADDITIONAL TESTS:  I have personally reviewed the last available Chest xray  CXR      CT      CARDIOLOGY TESTING      MEDICATIONS  aspirin  chewable 81 Oral daily  dextrose 40% Gel 15 Oral once  dextrose 5%. 1000 IV Continuous <Continuous>  dextrose 5%. 1000 IV Continuous <Continuous>  dextrose 50% Injectable 25 IV Push once  dextrose 50% Injectable 12.5 IV Push once  dextrose 50% Injectable 25 IV Push once  DULoxetine 90 Oral daily  enoxaparin Injectable 40 SubCutaneous daily  gabapentin 600 Oral three times a day  glucagon  Injectable 1 IntraMuscular once  insulin glargine Injectable (LANTUS) 10 SubCutaneous every morning  insulin lispro (ADMELOG) corrective regimen sliding scale  SubCutaneous three times a day before meals  meropenem  IVPB 2 IV Intermittent every 8 hours  oxyCODONE  ER Tablet 15 Oral every 12 hours  oxyCODONE  ER Tablet 10 Oral every 12 hours  prasugrel 10 Oral daily      WEIGHT  Weight (kg): 81 (05-26-21 @ 11:17)  Creatinine, Serum: 1.0 mg/dL (05-27-21 @ 05:54)      ANTIBIOTICS:  meropenem  IVPB 2 milliGRAM(s) IV Intermittent every 8 hours      All available historical records have been reviewed

## 2021-05-27 NOTE — BRIEF OPERATIVE NOTE - ANTIBIOTIC PROTOCOL
Followed protocol Hide Biopsy Depth?: No Depth Of Biopsy: dermis Cryotherapy Text: The wound bed was treated with cryotherapy after the biopsy was performed. Additional Anesthesia Volume In Cc (Will Not Render If 0): 0 Anesthesia Type: 1% lidocaine with epinephrine Post-Care Instructions: I reviewed with the patient in detail post-care instructions. Patient is to keep the biopsy site dry overnight, and then apply bacitracin twice daily until healed. Patient may apply hydrogen peroxide soaks to remove any crusting. Lab Facility: [id number] Wound Care: Petrolatum Biopsy Type: H and E Electrodesiccation Text: The wound bed was treated with electrodesiccation after the biopsy was performed. Notification Instructions: Patient will be notified of biopsy results. However, patient instructed to call the office if not contacted within 2 weeks. Type Of Destruction Used: Curettage Consent: Written consent was obtained and risks were reviewed including but not limited to scarring, infection, bleeding, scabbing, incomplete removal, nerve damage and allergy to anesthesia. Anesthesia Volume In Cc (Will Not Render If 0): 0.5 Was A Bandage Applied: Yes Electrodesiccation And Curettage Text: The wound bed was treated with electrodesiccation and curettage after the biopsy was performed. Detail Level: Detailed Biopsy Method: Dermablade Dressing: bandage Silver Nitrate Text: The wound bed was treated with silver nitrate after the biopsy was performed. Hemostasis: Drysol Size Of Lesion In Cm: 0.1 Billing Type: Third-Party Bill Lab: [id number] Curettage Text: The wound bed was treated with curettage after the biopsy was performed.

## 2021-05-27 NOTE — PROVIDER CONTACT NOTE (OTHER) - SITUATION
Notified team about loss of IV access and multiple attempts by nursing failed.
Patient hemevac detached and refused to let nursing re-attach. Notified primary team/ortho
after eating a cookie and drinking juice, pt's BG at this time is 95; pt is also much more alert at this time
fs 246, pt has no slidding scale or lantus ordered for bedtime
PA notified pts dressing to LLE is coming off
pt fingerstick 281

## 2021-05-27 NOTE — PROGRESS NOTE ADULT - ASSESSMENT
ASSESSMENT  65 year old male with PMH of CAD s/p MI, PCI/CABG, CHFrEF (15-20%), has ICD, HTN, celiac disease, DM, neuropathy, chronic b/l LE ulcers presents, severe chronic pain,  hx infected R TKR with MRSA (was eventually cemented so has limited ROM R knee), and history of cholecystostomy tube placement in February 2020 for acute cholecystitis s/p removal in May 2020 with multiple presentations including persistent bilious drainage from the prior tract site as well as a RUQ abdominal abscess at the site s/p drainage who presents with left knee pain    IMPRESSION  #Septic Arthritis of left knee with MSSA bacteremia  - s/p arthrocentesis of left knee - consistent with baterial septic infection   - s/p OR washout 4/26 -- purulent fluid in left knee with significant tricompartmental arthritis   - Blood Cx 4/26 MSSA  - OR Cx 4/26 MSSA  - Blood Cx 4/27 NG, 4/28 growing Staph   - Blood Cx 4/30-5/4 NG  - JOHN PAUL 5/5: no evidence of valvular or lead vegation; noted mild, non-mobile atheroma seen in the thoracic aorta.   - MR Knee w/wo IV Cont, Left (05.06.21 @ 19:05): Septic arthritis of the knee with osteomyelitis in the distal femur and proximal tibia. Associated large joint effusion with extensive synovitis. Noadditional collection identified.  - s/p knee tap 5/9 -- 15 cc of blood fluid; WBC 95049 (95% PMN)  - s/p knee tap 5/11 WBC 37296 (97% PMN)  - s/p Arthroscopic drainage of knee 5/27 -- no purulence but noted large organized hematoma -- antibiotic beads placed       #Biliary Drainage from previous perc sudhir site  - Previous Intraabdominal 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 Decreased area of right upper quadrant subcutaneous stranding at site of prior percutaneous cholecystostomy, without evidence of abscess.  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    #CHF s/p ICD  #DM   #Abx allergy: carvedilol (Other)  enalapril (Hives)  Entresto (Other)    Creatinine, Serum: 1.0 mg/dL (04.25.21 @ 22:15)  Weight (kg): 81 (26 Apr 2021 17:23)    RECOMMENDATIONS  - appreciate ortho recs  - will follow-up OR cultures  - continue nafcillin 2g q 4 hours  - agree with ortho recommendation for vascular consult     Please call or message on [employer] Teams if with any questions.  Spectra [phone]   ASSESSMENT  65 year old male with PMH of CAD s/p MI, PCI/CABG, CHFrEF (15-20%), has ICD, HTN, celiac disease, DM, neuropathy, chronic b/l LE ulcers presents, severe chronic pain,  hx infected R TKR with MRSA (was eventually cemented so has limited ROM R knee), and history of cholecystostomy tube placement in February 2020 for acute cholecystitis s/p removal in May 2020 with multiple presentations including persistent bilious drainage from the prior tract site as well as a RUQ abdominal abscess at the site s/p drainage who presents with left knee pain    IMPRESSION  #Septic Arthritis of left knee with MSSA bacteremia  - s/p arthrocentesis of left knee - consistent with baterial septic infection   - s/p OR washout 4/26 -- purulent fluid in left knee with significant tricompartmental arthritis   - Blood Cx 4/26 MSSA  - OR Cx 4/26 MSSA  - Blood Cx 4/27 NG, 4/28 growing Staph   - Blood Cx 4/30-5/4 NG  - JOHN PAUL 5/5: no evidence of valvular or lead vegation; noted mild, non-mobile atheroma seen in the thoracic aorta.   - MR Knee w/wo IV Cont, Left (05.06.21 @ 19:05): Septic arthritis of the knee with osteomyelitis in the distal femur and proximal tibia. Associated large joint effusion with extensive synovitis. Noadditional collection identified.  - s/p knee tap 5/9 -- 15 cc of blood fluid; WBC 89476 (95% PMN)  - s/p knee tap 5/11 WBC 79075 (97% PMN)  - s/p Arthroscopic drainage of knee 5/27 -- no purulence but noted large organized hematoma -- antibiotic beads placed       #Biliary Drainage from previous perc sudhir site  - Previous Intraabdominal 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 Decreased area of right upper quadrant subcutaneous stranding at site of prior percutaneous cholecystostomy, without evidence of abscess.  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    #CHF s/p ICD  #DM   #Abx allergy: carvedilol (Other)  enalapril (Hives)  Entresto (Other)    Creatinine, Serum: 1.0 mg/dL (04.25.21 @ 22:15)  Weight (kg): 81 (26 Apr 2021 17:23)    RECOMMENDATIONS  - appreciate ortho recs  - will follow-up OR cultures  - continue nafcillin 2g q 4 hours  - agree with ortho recommendation for vascular consult   - noted wound Cx 5/24 - labeled as joint fluid although taken from abdominal drainage -- this is likely a colonizer, would not target at this point     Please call or message on [employer] Teams if with any questions.  Spectra [phone]

## 2021-05-28 LAB
ALBUMIN SERPL ELPH-MCNC: 2.7 G/DL — LOW (ref 3.5–5.2)
ALP SERPL-CCNC: 86 U/L — SIGNIFICANT CHANGE UP (ref 30–115)
ALT FLD-CCNC: 12 U/L — SIGNIFICANT CHANGE UP (ref 0–41)
ANION GAP SERPL CALC-SCNC: 8 MMOL/L — SIGNIFICANT CHANGE UP (ref 7–14)
AST SERPL-CCNC: 15 U/L — SIGNIFICANT CHANGE UP (ref 0–41)
BILIRUB SERPL-MCNC: 0.9 MG/DL — SIGNIFICANT CHANGE UP (ref 0.2–1.2)
BUN SERPL-MCNC: 12 MG/DL — SIGNIFICANT CHANGE UP (ref 10–20)
CALCIUM SERPL-MCNC: 8.1 MG/DL — LOW (ref 8.5–10.1)
CHLORIDE SERPL-SCNC: 100 MMOL/L — SIGNIFICANT CHANGE UP (ref 98–110)
CO2 SERPL-SCNC: 29 MMOL/L — SIGNIFICANT CHANGE UP (ref 17–32)
CREAT SERPL-MCNC: 0.8 MG/DL — SIGNIFICANT CHANGE UP (ref 0.7–1.5)
GLUCOSE BLDC GLUCOMTR-MCNC: 222 MG/DL — HIGH (ref 70–99)
GLUCOSE BLDC GLUCOMTR-MCNC: 242 MG/DL — HIGH (ref 70–99)
GLUCOSE BLDC GLUCOMTR-MCNC: 251 MG/DL — HIGH (ref 70–99)
GLUCOSE BLDC GLUCOMTR-MCNC: 253 MG/DL — HIGH (ref 70–99)
GLUCOSE BLDC GLUCOMTR-MCNC: 262 MG/DL — HIGH (ref 70–99)
GLUCOSE SERPL-MCNC: 210 MG/DL — HIGH (ref 70–99)
HCT VFR BLD CALC: 31.2 % — LOW (ref 42–52)
HGB BLD-MCNC: 9.2 G/DL — LOW (ref 14–18)
MAGNESIUM SERPL-MCNC: 1.9 MG/DL — SIGNIFICANT CHANGE UP (ref 1.8–2.4)
MCHC RBC-ENTMCNC: 22.2 PG — LOW (ref 27–31)
MCHC RBC-ENTMCNC: 29.5 G/DL — LOW (ref 32–37)
MCV RBC AUTO: 75.2 FL — LOW (ref 80–94)
NRBC # BLD: 0 /100 WBCS — SIGNIFICANT CHANGE UP (ref 0–0)
PHOSPHATE SERPL-MCNC: 2.8 MG/DL — SIGNIFICANT CHANGE UP (ref 2.1–4.9)
PLATELET # BLD AUTO: 466 K/UL — HIGH (ref 130–400)
POTASSIUM SERPL-MCNC: 5 MMOL/L — SIGNIFICANT CHANGE UP (ref 3.5–5)
POTASSIUM SERPL-SCNC: 5 MMOL/L — SIGNIFICANT CHANGE UP (ref 3.5–5)
PROT SERPL-MCNC: 6.5 G/DL — SIGNIFICANT CHANGE UP (ref 6–8)
RBC # BLD: 4.15 M/UL — LOW (ref 4.7–6.1)
RBC # FLD: 25 % — HIGH (ref 11.5–14.5)
SODIUM SERPL-SCNC: 137 MMOL/L — SIGNIFICANT CHANGE UP (ref 135–146)
WBC # BLD: 8.86 K/UL — SIGNIFICANT CHANGE UP (ref 4.8–10.8)
WBC # FLD AUTO: 8.86 K/UL — SIGNIFICANT CHANGE UP (ref 4.8–10.8)

## 2021-05-28 PROCEDURE — 99233 SBSQ HOSP IP/OBS HIGH 50: CPT

## 2021-05-28 RX ORDER — INSULIN LISPRO 100/ML
3 VIAL (ML) SUBCUTANEOUS
Refills: 0 | Status: DISCONTINUED | OUTPATIENT
Start: 2021-05-28 | End: 2021-06-15

## 2021-05-28 RX ORDER — MORPHINE SULFATE 50 MG/1
2 CAPSULE, EXTENDED RELEASE ORAL ONCE
Refills: 0 | Status: DISCONTINUED | OUTPATIENT
Start: 2021-05-28 | End: 2021-05-28

## 2021-05-28 RX ORDER — INSULIN GLARGINE 100 [IU]/ML
20 INJECTION, SOLUTION SUBCUTANEOUS EVERY MORNING
Refills: 0 | Status: DISCONTINUED | OUTPATIENT
Start: 2021-05-28 | End: 2021-06-02

## 2021-05-28 RX ADMIN — OXYCODONE HYDROCHLORIDE 10 MILLIGRAM(S): 5 TABLET ORAL at 05:57

## 2021-05-28 RX ADMIN — Medication 3 UNIT(S): at 17:26

## 2021-05-28 RX ADMIN — PRASUGREL 10 MILLIGRAM(S): 5 TABLET, FILM COATED ORAL at 11:44

## 2021-05-28 RX ADMIN — NAFCILLIN 200 GRAM(S): 10 INJECTION, POWDER, FOR SOLUTION INTRAVENOUS at 11:49

## 2021-05-28 RX ADMIN — GABAPENTIN 600 MILLIGRAM(S): 400 CAPSULE ORAL at 05:57

## 2021-05-28 RX ADMIN — INSULIN GLARGINE 10 UNIT(S): 100 INJECTION, SOLUTION SUBCUTANEOUS at 10:12

## 2021-05-28 RX ADMIN — MORPHINE SULFATE 2 MILLIGRAM(S): 50 CAPSULE, EXTENDED RELEASE ORAL at 21:08

## 2021-05-28 RX ADMIN — NAFCILLIN 200 GRAM(S): 10 INJECTION, POWDER, FOR SOLUTION INTRAVENOUS at 19:28

## 2021-05-28 RX ADMIN — Medication 3: at 11:42

## 2021-05-28 RX ADMIN — OXYCODONE HYDROCHLORIDE 5 MILLIGRAM(S): 5 TABLET ORAL at 18:33

## 2021-05-28 RX ADMIN — NAFCILLIN 200 GRAM(S): 10 INJECTION, POWDER, FOR SOLUTION INTRAVENOUS at 01:56

## 2021-05-28 RX ADMIN — Medication 2: at 08:02

## 2021-05-28 RX ADMIN — OXYCODONE HYDROCHLORIDE 5 MILLIGRAM(S): 5 TABLET ORAL at 23:16

## 2021-05-28 RX ADMIN — ENOXAPARIN SODIUM 40 MILLIGRAM(S): 100 INJECTION SUBCUTANEOUS at 11:44

## 2021-05-28 RX ADMIN — NAFCILLIN 200 GRAM(S): 10 INJECTION, POWDER, FOR SOLUTION INTRAVENOUS at 21:07

## 2021-05-28 RX ADMIN — DULOXETINE HYDROCHLORIDE 90 MILLIGRAM(S): 30 CAPSULE, DELAYED RELEASE ORAL at 11:44

## 2021-05-28 RX ADMIN — NAFCILLIN 200 GRAM(S): 10 INJECTION, POWDER, FOR SOLUTION INTRAVENOUS at 15:30

## 2021-05-28 RX ADMIN — Medication 3: at 17:25

## 2021-05-28 RX ADMIN — GABAPENTIN 600 MILLIGRAM(S): 400 CAPSULE ORAL at 21:06

## 2021-05-28 RX ADMIN — MORPHINE SULFATE 2 MILLIGRAM(S): 50 CAPSULE, EXTENDED RELEASE ORAL at 00:30

## 2021-05-28 RX ADMIN — MORPHINE SULFATE 2 MILLIGRAM(S): 50 CAPSULE, EXTENDED RELEASE ORAL at 21:05

## 2021-05-28 RX ADMIN — Medication 81 MILLIGRAM(S): at 11:43

## 2021-05-28 RX ADMIN — NAFCILLIN 200 GRAM(S): 10 INJECTION, POWDER, FOR SOLUTION INTRAVENOUS at 05:57

## 2021-05-28 RX ADMIN — OXYCODONE HYDROCHLORIDE 10 MILLIGRAM(S): 5 TABLET ORAL at 17:29

## 2021-05-28 RX ADMIN — OXYCODONE HYDROCHLORIDE 10 MILLIGRAM(S): 5 TABLET ORAL at 06:08

## 2021-05-28 RX ADMIN — CYCLOBENZAPRINE HYDROCHLORIDE 5 MILLIGRAM(S): 10 TABLET, FILM COATED ORAL at 08:37

## 2021-05-28 NOTE — PROGRESS NOTE ADULT - ATTENDING COMMENTS
Pt. seen/ examined  Labs/ vitals reviewed  Patient refused Doppler today  Dressing re-applied  Recommend improved pain control and repeat attempt at Doppler  Will follow

## 2021-05-28 NOTE — CONSULT NOTE ADULT - ASSESSMENT
IMPRESSION:  - CAD s/p PCI to RCA and OM1, s/p LIMA to LAD (2018)   - Severe ischemic cardiomyopathy (EF 15-20%) s/p CRT-D - currently compensated - not fluid overloaded  - DMII, HTN, DL  - Currently admitted with septic arthritis of the knee s/p drainage, irrigation and debridement  - PAD  IMPRESSION:  - CAD s/p PCI to RCA and OM1, s/p LIMA to LAD (2018)   - Severe ischemic cardiomyopathy (EF 15-20%) s/p CRT-D - currently compensated - not fluid overloaded  - DMII, HTN, DL  - Currently admitted with septic arthritis of the knee s/p drainage, irrigation and debridement  - r/o PAD     Recommendations:   Bilateral LE arterial duplex (non-urgent, please consult with ortho first because the dressing and fixator have to be moved for the study)  c/w ASA and Effient   Resume high intensity statin  c/w GDMT for HFrEF  IMPRESSION:  - CAD s/p PCI to RCA and OM1, s/p LIMA to LAD (2018)   - Severe ischemic cardiomyopathy (EF 15-20%) s/p CRT-D - currently compensated - not fluid overloaded  - DMII, HTN, DL  - Currently admitted with septic arthritis of the knee s/p drainage, irrigation and debridement  - r/o PAD     Recommendations:   Bilateral LE arterial duplex (non-urgent, please consult with ortho first because the dressing and brace have to be removed for the study)  c/w ASA and Effient   Resume high intensity statin  c/w GDMT for HFrEF  IMPRESSION:  - CAD s/p PCI to RCA and OM1, s/p LIMA to LAD (2018)   - Severe ischemic cardiomyopathy (EF 15-20%) s/p CRT-D - currently compensated - not fluid overloaded  - DMII, HTN, DL  - Currently admitted with septic arthritis of the knee s/p drainage, irrigation and debridement  - r/o PAD     Recommendations:   Bilateral LE arterial duplex (non-urgent, please consult with ortho first because the dressing and brace have to be removed for the study)  c/w ASA and Effient   Resume high intensity statin  c/w GDMT for HFrEF   Will F/U

## 2021-05-28 NOTE — CONSULT NOTE ADULT - SUBJECTIVE AND OBJECTIVE BOX
HISTORY OF PRESENT ILLNESS: 65 year old male with PMH of CAD s/p MI, PCI/CABG, CHFrEF (15-20%), has ICD, HTN, celiac disease, DM, neuropathy, chronic b/l LE ulcers presents, severe chronic pain,  hx infected R TKR with MRSA (was eventually cemented so has limited ROM R knee), and history of cholecystostomy tube placement in February 2020 for acute cholecystitis s/p removal in May 2020 with multiple presentations including persistent bilious drainage from the prior tract site as well as a RUQ abdominal abscess at the site s/p drainage. He presented to ED with inability to walk/ambulate 2/2 left knee pain. Pt has hx of knee pain and infections. 2 weeks ago pt received a 'steroid' shot for his left knee for pain. 2 days later, his knee began to swell and he was unable to walk or bend the knee. It progressively gotten worse to the point where the pain was unbearable so he presented to the ED.     Pt endorses a weight loss >70 lbs over the last year, there is bilious drainage coming from where he had a prior per sudhir, jaundice in the finger tips and sclera. Recent admission for uncontrollable movements, no diagnosis given. Pt denies f/c/n/v, chest pain, headaches, UTI symptoms     In the ED:  Joint was aspirated cloudy yellow fluid was seen and sent for cultures. Neutrophil count >50,000 and RBC >8000. Ortho consulted for septic arthritis, s/p arthroscopic drainage. Surg consulted for bile drainage, HIDA scan, consult GI, possible ERCP. CT A&P, No evidence of acute intra-abdominal pathology. Decreased area of right upper quadrant subcutaneous stranding at site of prior percutaneous cholecystostomy, without evidence of abscess. Vitally stable. Admitted to medicine for medical management.    The patient is being followed by Neurology, and he is followed by Cardiology Dr. Lopez in  and Dr. Lilia Caldera in [facility], also by Endo Dr. Cormier at [facility].      PAST MEDICAL & SURGICAL HISTORY:  Status post percutaneous transluminal coronary angioplasty  2 stents    Atherosclerosis of coronary artery  CAD (coronary artery disease)    Osteoarthritis    HLD (hyperlipidemia)    Blood clot due to device, implant, or graft  was on blood thinners    Diabetes  on insulin pump    HTN (hypertension)    CHF (congestive heart failure)  EF ~ 25%    History of celiac disease    Diverticulitis    STEMI (ST elevation myocardial infarction)    Diabetic neuropathy    Anxiety and depression    Other postprocedural status  Fixation hardware in foot LEFT    Stented coronary artery  10/18 heart attack  INFERIOR WALL MI    S/P CABG x 1  2018    S/P TKR (total knee replacement), right  with infection Mrsa   per pt he was cleared from MRSA infection    Surgery, elective  right knee wound debridement    History of open reduction and internal fixation (ORIF) procedure    H/O shoulder surgery  right    AICD (automatic cardioverter/defibrillator) present    Cholecystostomy care  drain inserted 2020 &amp; removed 4 months ago    History of tonsillectomy    History of hip replacement, total, right        FAMILY HISTORY:  [ ] no pertinent family history of premature cardiovascular disease in first degree relatives.  Mother:   Father:   Siblings:     SOCIAL HISTORY:    [ ] Non-smoker  [ ] Smoker  [ ] Alcohol    Allergies    ACE inhibitors (Hives)  carvedilol (Other)  enalapril (Hives)  Entresto (Other)    Intolerances    	    REVIEW OF SYSTEMS:  CONSTITUTIONAL: denies fever, weight loss, or fatigue  CARDIOLOGY: denies chest pain, shortness of breath or syncopal episodes.   RESPIRATORY: denies shortness of breath, wheezing.   NEUROLOGICAL: denies weakness, no focal deficits to report.  ENDOCRINOLOGICAL: no recent change in diabetic medications.   GI: no BRBPR, no N,V, diarrhea.    PSYCHIATRY: normal mood and affect  HEENT: no nasal discharge, no ecchymosis  SKIN: no ecchymosis, no breakdown  MUSCULOSKELETAL: Full range of motion x4.     PHYSICAL EXAM:  T(C): 35.7 (05-28-21 @ 14:34), Max: 38.4 (05-27-21 @ 18:21)  HR: 86 (05-28-21 @ 14:34) (80 - 95)  BP: 124/55 (05-28-21 @ 14:34) (115/64 - 127/67)  RR: 18 (05-28-21 @ 14:34) (18 - 19)  SpO2: --  Wt(kg): --  I&O's Summary    27 May 2021 07:01  -  28 May 2021 07:00  --------------------------------------------------------  IN: 0 mL / OUT: 700 mL / NET: -700 mL        General Appearance: well appearing, normal for age and gender. 	  Neck: normal JVP, no bruit.   Eyes: No xanthomalasia, Extra Ocular muscles intact.   Cardiovascular: regular rate and rhythm S1 S2, No JVD, No murmurs, No edema  Respiratory: Lungs clear to auscultation	  Psychiatry: Alert and oriented x 3, Mood & affect appropriate  Gastrointestinal:  Soft, Non-tender  Skin/Integumen: No rashes, No ecchymoses, No cyanosis	  Neurologic: Non-focal  Musculoskeletal/extremities: Normal range of motion, No clubbing, cyanosis or edema  Vascular: Peripheral pulses palpable 2+ bilaterally    LABS:	 	                          9.2    8.86  )-----------( 466      ( 28 May 2021 05:52 )             31.2     05-28    137  |  100  |  12  ----------------------------<  210<H>  5.0   |  29  |  0.8    Ca    8.1<L>      28 May 2021 05:52  Phos  2.8     05-28  Mg     1.9     05-28    TPro  6.5  /  Alb  2.7<L>  /  TBili  0.9  /  DBili  x   /  AST  15  /  ALT  12  /  AlkPhos  86  05-28        PT/INR - ( 27 May 2021 05:54 )   PT: 17.10 sec;   INR: 1.49 ratio         PTT - ( 27 May 2021 05:54 )  PTT:32.3 sec    CARDIAC MARKERS:      -CXR: no acute pathology    -TTE: < from: TTE Echo Complete w/o Contrast w/ Doppler (04.27.21 @ 06:44) >    Summary:   1. Severely decreased global left ventricular systolic function.   2. LV Ejection Fraction by Wilkerson's Method with a biplane EF of 25 %.   3. Global cardiomyopathy.   4. Normal left ventricular internal cavity size.   5. Mildly enlarged left atrium.   6. Normal right atrial size.   7. There is no evidence of pericardial effusion.   8. Mild thickening of the anterior and posterior mitral valve leaflets.   9. Mild to moderate mitral valve regurgitation.  10. Mild-moderate tricuspid regurgitation.  11. Sclerotic aortic valve with normal opening.  12. Pulmonary hypertension is present.    < end of copied text >      -CATHETERIZATION: < from: Cardiac Cath Lab - Adult (12.12.18 @ 10:29) >  CORONARY CIRCULATION: The coronary circulation is right dominant. Therewas    1-vessel coronary artery disease (LAD). Bypass grafts were patent. Left    main: Normal. LAD: The vessel was medium sized. Ostial LAD: There was a    discrete 95 % stenosis. There was ARIANA grade 3 flow through the vessel    (brisk flow). Proximal LAD: There was a diffuse 70 % stenosis at the site    of a prior stent. There was ARIANA grade 3 flow through the vessel (brisk    flow). Mid LAD: The vessel was small to medium sized. Angiography showed    mild atherosclerosis with no flow limitinglesions. Distal LAD: The vessel    was small to medium sized. Angiography showed mild atherosclerosis with no    flow limiting lesions. The artery was supplied by a patent bypass graft.    Circumflex: The vessel was medium sized. Proximal circumflex:Angiography    showed mild atherosclerosis with no flow limiting lesions. Distal    circumflex: The vessel was small to medium sized. Angiography showed minor    luminal irregularities with no flow limiting lesions. 1st obtuse marginal:    The vesselwas medium sized. Prior stent was patent. There was a discrete    50- 60 % stenosis at a site with no prior intervention, at the ostium of    the vessel segment, at the proximal margin of the stented segment. The    lesion was hazy. There was ARIANA grade 3 flow through the vessel (brisk    flow). RCA: The vessel was medium to large sized. Proximal RCA:    Angiography showed no evidence of disease. Mid RCA: Angiography showed no    evidence of disease. Distal RCA: The vessel was medium to large sized.    There was a discrete 30- 40 % stenosis at the site of a prior stent. There    was ARIANA grade 3 flow through the vessel (brisk flow). Graft to the LAD:    The graft was a medium sized LIMA. Graft angiography showed no evidence of    disease.    < end of copied text >      Home Medications:  ALPRAZolam 0.5 mg oral tablet: 1 tab(s) orally every 8 hours (26 Apr 2021 12:11)  aspirin 81 mg oral delayed release tablet: 1 tab(s) orally once a day (26 Apr 2021 12:11)  digoxin 125 mcg (0.125 mg) oral tablet: 1 tab(s) orally once a day (26 Apr 2021 12:11)  DULoxetine: 90 milligram(s) orally once a day (26 Apr 2021 12:11)  insulin: pump; HUMULIN (26 Apr 2021 12:11)  Jardiance 10 mg oral tablet: 1 tab(s) orally once a day (in the morning) (26 Apr 2021 12:11)  melatonin 10 mg oral tablet: 1 tab(s) orally once a day (at bedtime), As needed, Insomnia (26 Apr 2021 12:11)  metoprolol succinate 25 mg oral tablet, extended release: 1 tab(s) orally 2 times a day (26 Apr 2021 12:11)  morphine 15 mg oral tablet: 1 tab(s) orally every 4 hours, As Needed - for severe pain (7 to 10 out of 10) (26 Apr 2021 12:11)  prasugrel 10 mg oral tablet: 1 tab(s) orally once a day (26 Apr 2021 12:11)  spironolactone 25 mg oral tablet: 1 tab(s) orally once a day (26 Apr 2021 12:11)    MEDICATIONS  (STANDING):  aspirin  chewable 81 milliGRAM(s) Oral daily  dextrose 40% Gel 15 Gram(s) Oral once  dextrose 5%. 1000 milliLiter(s) (50 mL/Hr) IV Continuous <Continuous>  dextrose 5%. 1000 milliLiter(s) (100 mL/Hr) IV Continuous <Continuous>  dextrose 50% Injectable 25 Gram(s) IV Push once  dextrose 50% Injectable 25 Gram(s) IV Push once  dextrose 50% Injectable 12.5 Gram(s) IV Push once  DULoxetine 90 milliGRAM(s) Oral daily  enoxaparin Injectable 40 milliGRAM(s) SubCutaneous daily  gabapentin 600 milliGRAM(s) Oral three times a day  glucagon  Injectable 1 milliGRAM(s) IntraMuscular once  insulin glargine Injectable (LANTUS) 20 Unit(s) SubCutaneous every morning  insulin lispro (ADMELOG) corrective regimen sliding scale   SubCutaneous three times a day before meals  insulin lispro Injectable (ADMELOG) 3 Unit(s) SubCutaneous three times a day before meals  nafcillin  IVPB 2 Gram(s) IV Intermittent every 4 hours  oxyCODONE  ER Tablet 10 milliGRAM(s) Oral every 12 hours  prasugrel 10 milliGRAM(s) Oral daily    MEDICATIONS  (PRN):  acetaminophen   Tablet .. 650 milliGRAM(s) Oral every 8 hours PRN Temp greater or equal to 38C (100.4F), Moderate Pain (4 - 6)  cyclobenzaprine 5 milliGRAM(s) Oral two times a day PRN Muscle Spasm  oxyCODONE    IR 5 milliGRAM(s) Oral every 4 hours PRN Severe Pain (7 - 10)         HISTORY OF PRESENT ILLNESS: 65 year old male with PMH of CAD s/p MI, PCI/CABG, CHFrEF (15-20%), has ICD, HTN, celiac disease, DM, neuropathy, chronic b/l LE ulcers presents, severe chronic pain,  hx infected R TKR with MRSA (was eventually cemented so has limited ROM R knee), and history of cholecystostomy tube placement in February 2020 for acute cholecystitis s/p removal in May 2020 with multiple presentations including persistent bilious drainage from the prior tract site as well as a RUQ abdominal abscess at the site s/p drainage. He presented to ED with inability to walk/ambulate 2/2 left knee pain. Pt has hx of knee pain and infections. 2 weeks ago pt received a 'steroid' shot for his left knee for pain. 2 days later, his knee began to swell and he was unable to walk or bend the knee. It progressively gotten worse to the point where the pain was unbearable so he presented to the ED.     Pt endorses a weight loss >70 lbs over the last year, there is bilious drainage coming from where he had a prior per sudhir, jaundice in the finger tips and sclera. Recent admission for uncontrollable movements, no diagnosis given. Pt denies f/c/n/v, chest pain, headaches, UTI symptoms     In the ED:  Joint was aspirated cloudy yellow fluid was seen and sent for cultures. Neutrophil count >50,000 and RBC >8000. Ortho consulted for septic arthritis, s/p arthroscopic drainage. Surg consulted for bile drainage, HIDA scan, consult GI, possible ERCP. CT A&P, No evidence of acute intra-abdominal pathology. Decreased area of right upper quadrant subcutaneous stranding at site of prior percutaneous cholecystostomy, without evidence of abscess. Vitally stable. Admitted to medicine for medical management.    The patient is being followed by Neurology, and he is followed by Cardiology Dr. Lopez in  and Dr. Lilia Caldera in [facility], also by Endo Dr. Cormier at [facility].      PAST MEDICAL & SURGICAL HISTORY:  Status post percutaneous transluminal coronary angioplasty  2 stents    Atherosclerosis of coronary artery  CAD (coronary artery disease)    Osteoarthritis    HLD (hyperlipidemia)    Blood clot due to device, implant, or graft  was on blood thinners    Diabetes  on insulin pump    HTN (hypertension)    CHF (congestive heart failure)  EF ~ 25%    History of celiac disease    Diverticulitis    STEMI (ST elevation myocardial infarction)    Diabetic neuropathy    Anxiety and depression    Other postprocedural status  Fixation hardware in foot LEFT    Stented coronary artery  10/18 heart attack  INFERIOR WALL MI    S/P CABG x 1  2018    S/P TKR (total knee replacement), right  with infection Mrsa   per pt he was cleared from MRSA infection    Surgery, elective  right knee wound debridement    History of open reduction and internal fixation (ORIF) procedure    H/O shoulder surgery  right    AICD (automatic cardioverter/defibrillator) present    Cholecystostomy care  drain inserted 2020 &amp; removed 4 months ago    History of tonsillectomy    History of hip replacement, total, right        FAMILY HISTORY:  [x ] no pertinent family history of premature cardiovascular disease in first degree relatives.  Mother:   Father:   Siblings:     SOCIAL HISTORY:    [ x] Non-smoker  [ ] Smoker  [ ] Alcohol    Allergies    ACE inhibitors (Hives)  carvedilol (Other)  enalapril (Hives)  Entresto (Other)    Intolerances    	    REVIEW OF SYSTEMS:  CONSTITUTIONAL: denies fever, weight loss, or fatigue  CARDIOLOGY: denies chest pain, shortness of breath or syncopal episodes.   RESPIRATORY: denies shortness of breath, wheezing.   NEUROLOGICAL: denies weakness, no focal deficits to report.  ENDOCRINOLOGICAL: no recent change in diabetic medications.   GI: no BRBPR, no N,V, diarrhea.    PSYCHIATRY: normal mood and affect  HEENT: no nasal discharge, no ecchymosis  SKIN: no ecchymosis, no breakdown  MUSCULOSKELETAL: see hpi    PHYSICAL EXAM:  T(C): 35.7 (05-28-21 @ 14:34), Max: 38.4 (05-27-21 @ 18:21)  HR: 86 (05-28-21 @ 14:34) (80 - 95)  BP: 124/55 (05-28-21 @ 14:34) (115/64 - 127/67)  RR: 18 (05-28-21 @ 14:34) (18 - 19)  SpO2: --  Wt(kg): --  I&O's Summary    27 May 2021 07:01  -  28 May 2021 07:00  --------------------------------------------------------  IN: 0 mL / OUT: 700 mL / NET: -700 mL        General Appearance: well appearing, normal for age and gender. 	  Neck: normal JVP, no bruit.   Eyes: No xanthomalasia, Extra Ocular muscles intact.   Cardiovascular: regular rate and rhythm S1 S2, No JVD, No murmurs, No edema  Respiratory: Lungs clear to auscultation	  Psychiatry: Alert and oriented x 3, Mood & affect appropriate  Gastrointestinal:  Soft, Non-tender  Skin/Integumen: No rashes, No ecchymoses, No cyanosis	  Neurologic: Non-focal  Musculoskeletal/extremities: Multiple healed ulcers in bilateral LE and feet   Vascular: DP: R: +1, L:+2; PT: R: +1, L: +1; CFA: R: +2, L: +1    LABS:	 	                          9.2    8.86  )-----------( 466      ( 28 May 2021 05:52 )             31.2     05-28    137  |  100  |  12  ----------------------------<  210<H>  5.0   |  29  |  0.8    Ca    8.1<L>      28 May 2021 05:52  Phos  2.8     05-28  Mg     1.9     05-28    TPro  6.5  /  Alb  2.7<L>  /  TBili  0.9  /  DBili  x   /  AST  15  /  ALT  12  /  AlkPhos  86  05-28        PT/INR - ( 27 May 2021 05:54 )   PT: 17.10 sec;   INR: 1.49 ratio         PTT - ( 27 May 2021 05:54 )  PTT:32.3 sec    CARDIAC MARKERS:      -CXR: no acute pathology    -TTE: < from: TTE Echo Complete w/o Contrast w/ Doppler (04.27.21 @ 06:44) >    Summary:   1. Severely decreased global left ventricular systolic function.   2. LV Ejection Fraction by Wilkerson's Method with a biplane EF of 25 %.   3. Global cardiomyopathy.   4. Normal left ventricular internal cavity size.   5. Mildly enlarged left atrium.   6. Normal right atrial size.   7. There is no evidence of pericardial effusion.   8. Mild thickening of the anterior and posterior mitral valve leaflets.   9. Mild to moderate mitral valve regurgitation.  10. Mild-moderate tricuspid regurgitation.  11. Sclerotic aortic valve with normal opening.  12. Pulmonary hypertension is present.    < end of copied text >      -CATHETERIZATION: < from: Cardiac Cath Lab - Adult (12.12.18 @ 10:29) >  CORONARY CIRCULATION: The coronary circulation is right dominant. Therewas    1-vessel coronary artery disease (LAD). Bypass grafts were patent. Left    main: Normal. LAD: The vessel was medium sized. Ostial LAD: There was a    discrete 95 % stenosis. There was ARIANA grade 3 flow through the vessel    (brisk flow). Proximal LAD: There was a diffuse 70 % stenosis at the site    of a prior stent. There was ARIANA grade 3 flow through the vessel (brisk    flow). Mid LAD: The vessel was small to medium sized. Angiography showed    mild atherosclerosis with no flow limitinglesions. Distal LAD: The vessel    was small to medium sized. Angiography showed mild atherosclerosis with no    flow limiting lesions. The artery was supplied by a patent bypass graft.    Circumflex: The vessel was medium sized. Proximal circumflex:Angiography    showed mild atherosclerosis with no flow limiting lesions. Distal    circumflex: The vessel was small to medium sized. Angiography showed minor    luminal irregularities with no flow limiting lesions. 1st obtuse marginal:    The vesselwas medium sized. Prior stent was patent. There was a discrete    50- 60 % stenosis at a site with no prior intervention, at the ostium of    the vessel segment, at the proximal margin of the stented segment. The    lesion was hazy. There was ARIANA grade 3 flow through the vessel (brisk    flow). RCA: The vessel was medium to large sized. Proximal RCA:    Angiography showed no evidence of disease. Mid RCA: Angiography showed no    evidence of disease. Distal RCA: The vessel was medium to large sized.    There was a discrete 30- 40 % stenosis at the site of a prior stent. There    was ARIANA grade 3 flow through the vessel (brisk flow). Graft to the LAD:    The graft was a medium sized LIMA. Graft angiography showed no evidence of    disease.    < end of copied text >      Home Medications:  ALPRAZolam 0.5 mg oral tablet: 1 tab(s) orally every 8 hours (26 Apr 2021 12:11)  aspirin 81 mg oral delayed release tablet: 1 tab(s) orally once a day (26 Apr 2021 12:11)  digoxin 125 mcg (0.125 mg) oral tablet: 1 tab(s) orally once a day (26 Apr 2021 12:11)  DULoxetine: 90 milligram(s) orally once a day (26 Apr 2021 12:11)  insulin: pump; HUMULIN (26 Apr 2021 12:11)  Jardiance 10 mg oral tablet: 1 tab(s) orally once a day (in the morning) (26 Apr 2021 12:11)  melatonin 10 mg oral tablet: 1 tab(s) orally once a day (at bedtime), As needed, Insomnia (26 Apr 2021 12:11)  metoprolol succinate 25 mg oral tablet, extended release: 1 tab(s) orally 2 times a day (26 Apr 2021 12:11)  morphine 15 mg oral tablet: 1 tab(s) orally every 4 hours, As Needed - for severe pain (7 to 10 out of 10) (26 Apr 2021 12:11)  prasugrel 10 mg oral tablet: 1 tab(s) orally once a day (26 Apr 2021 12:11)  spironolactone 25 mg oral tablet: 1 tab(s) orally once a day (26 Apr 2021 12:11)    MEDICATIONS  (STANDING):  aspirin  chewable 81 milliGRAM(s) Oral daily  dextrose 40% Gel 15 Gram(s) Oral once  dextrose 5%. 1000 milliLiter(s) (50 mL/Hr) IV Continuous <Continuous>  dextrose 5%. 1000 milliLiter(s) (100 mL/Hr) IV Continuous <Continuous>  dextrose 50% Injectable 25 Gram(s) IV Push once  dextrose 50% Injectable 25 Gram(s) IV Push once  dextrose 50% Injectable 12.5 Gram(s) IV Push once  DULoxetine 90 milliGRAM(s) Oral daily  enoxaparin Injectable 40 milliGRAM(s) SubCutaneous daily  gabapentin 600 milliGRAM(s) Oral three times a day  glucagon  Injectable 1 milliGRAM(s) IntraMuscular once  insulin glargine Injectable (LANTUS) 20 Unit(s) SubCutaneous every morning  insulin lispro (ADMELOG) corrective regimen sliding scale   SubCutaneous three times a day before meals  insulin lispro Injectable (ADMELOG) 3 Unit(s) SubCutaneous three times a day before meals  nafcillin  IVPB 2 Gram(s) IV Intermittent every 4 hours  oxyCODONE  ER Tablet 10 milliGRAM(s) Oral every 12 hours  prasugrel 10 milliGRAM(s) Oral daily    MEDICATIONS  (PRN):  acetaminophen   Tablet .. 650 milliGRAM(s) Oral every 8 hours PRN Temp greater or equal to 38C (100.4F), Moderate Pain (4 - 6)  cyclobenzaprine 5 milliGRAM(s) Oral two times a day PRN Muscle Spasm  oxyCODONE    IR 5 milliGRAM(s) Oral every 4 hours PRN Severe Pain (7 - 10)

## 2021-05-28 NOTE — PROGRESS NOTE ADULT - ASSESSMENT
A/P:    A/P: 64 yo male admitted with left septic knee and severe PVD    --Left septic knee with synovitis: S/P left knee wash out POD #1 by ortho. Doing well. WBAT. Drains to remain x1 week. Will continue SCD and lovenox, nafcillin. Awaiting intra operative cx result. Pain control and lab monitoring. Will consult PT/OT  --PVSD:  Vascular consulted via Dr Nelson  and Dr Margarito Thomas's office. I called and left a message with the . I left my spectra and cell number with the sec for any question(s). 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 EF of 15%. On Nafcillin for septic knee  --MSSA Bacteremia: On Nafcillin . Will continue per ID Rec.   --Pseudomonas Aeruginosa infection: From the biliary drainage site. Likely colonization  --Hyperkalemia: Corrected  --DM2: Continue current regimen  --Anemia: Iron  def combined with ACD  --CAD: ASA, statin, Toprol and Prasugrel  --CHF with reduced EF: Aldactone and Lasix  --Covid vaccine status: He has received one dose and is supposed to receive the second dose on 5/15/21 but he was already hospitalized. ID on board and will appreciate their input on that.    --DVT ppx: Lovenox, prasugrel and ASA      #Progress Note Handoff: Pending ortho clearance and vascular consult. Midline placement for prolonged abx administration. Will reconsult PT    Pending (specify):  Consults_____Vascular____, Tests________, Test Results_______, Other_________  Family discussion:  Disposition: Home___/SNF_x__/Other________/Unknown at this time________       A/P:    A/P: 66 yo male admitted with left septic knee and severe PVD    --Left septic knee with synovitis: S/P left knee wash out POD #1 by ortho. Doing well. WBAT. Drains to remain x1 week. Will continue SCD and lovenox, nafcillin. Awaiting intra operative cx result. Pain control and lab monitoring. Will consult PT/OT  --PVSD:  Vascular consulted via Dr Nelson  and Dr Margarito Thomas's office. I called and left a message with the . I left my spectra and cell number with the sec for any question(s). 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 EF of 15%. On Nafcillin for septic knee  --MSSA Bacteremia: On Nafcillin . Will continue per ID Rec.   --Pseudomonas Aeruginosa infection: From the biliary drainage site. Likely colonization  --Hyperkalemia: Corrected  --DM2: Will increase insulin regimen to 20 units of Lantus qam, will add 3 units of lispro ac and continue corrective scale   --Anemia: Iron  def combined with ACD  --CAD: ASA, statin, Toprol and Prasugrel  --CHF with reduced EF: Aldactone and Lasix  --Covid vaccine status: He has received one dose and is supposed to receive the second dose on 5/15/21 but he was already hospitalized. ID on board and will appreciate their input on that.    --DVT ppx: Lovenox, prasugrel and ASA      #Progress Note Handoff: Pending ortho clearance and vascular consult. Midline placement for prolonged abx administration. Will reconsult PT    Pending (specify):  Consults_____Vascular____, Tests________, Test Results_______, Other_________  Family discussion:  Disposition: Home___/SNF_x__/Other________/Unknown at this time________       A/P:    A/P: 66 yo male admitted with left septic knee and severe PVD    --Left septic knee with synovitis: S/P left knee wash out POD #1 by ortho. Doing well. WBAT. Drains to remain x1 week. Will continue SCD and lovenox, nafcillin. Awaiting intra operative cx result. Pain control and lab monitoring. Will consult PT/OT  --PVSD:  Vascular consulted via Dr Nelson. Duplex is pending. Rec will be followed  including resuming statin. 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 EF of 15%. On Nafcillin for septic knee. ? stent placement  pending critical perfusion inhibition order wise, the risk of infection from the septic knee over weighs the risk of dissemination. Will defer to vaascular   --MSSA Bacteremia: On Nafcillin . Will continue per ID Rec.   --Pseudomonas Aeruginosa infection: From the biliary drainage site. Likely colonization  --Hyperkalemia: Corrected  --DM2: Will increase insulin regimen to 20 units of Lantus qam, will add 3 units of lispro ac and continue corrective scale   --Anemia: Iron  def combined with ACD  --CAD: ASA, statin, Toprol and Prasugrel  --CHF with reduced EF: Aldactone and Lasix  --Covid vaccine status: He has received one dose and is supposed to receive the second dose on 5/15/21 but he was already hospitalized. ID on board and will appreciate their input on that.    --DVT ppx: Lovenox, prasugrel and ASA      #Progress Note Handoff: Pending ortho clearance and vascular doppler review . Midline placement for prolonged abx administration. Will reconsult PT    Pending (specify):  Consults________, Tests________, Test Results_______, Other_________  Family discussion:  Disposition: Home___/SNF_x__/Other________/Unknown at this time________

## 2021-05-28 NOTE — PROGRESS NOTE ADULT - SUBJECTIVE AND OBJECTIVE BOX
LOIDAFLOWER MADRIGAL  65y, Male  Allergy: ACE inhibitors (Hives)  carvedilol (Other)  enalapril (Hives)  Entresto (Other)      LOS  32d    CHIEF COMPLAINT: Septic arthritis (28 May 2021 16:05)      INTERVAL EVENTS/HPI  - No acute events overnight  - T(F): , Max: 101.1 (05-27-21 @ 18:21)  - fevers yesterday  - denies any nausea, vomiting, abdominal pain   - Denies any worsening symptoms  - Tolerating medication  - WBC Count: 8.86 (05-28-21 @ 05:52)  WBC Count: 7.38 (05-27-21 @ 05:54)     - Creatinine, Serum: 0.8 (05-28-21 @ 05:52)  Creatinine, Serum: 1.0 (05-27-21 @ 05:54)       ROS  General: Denies rigors, nightsweats  HEENT: Denies headache, rhinorrhea, sore throat, eye pain  CV: Denies CP, palpitations  PULM: Denies wheezing, hemoptysis  GI: Denies hematemesis, hematochezia, melena  : Denies discharge, hematuria  MSK: Denies arthralgias, myalgias  SKIN: Denies rash, lesions  NEURO: Denies paresthesias, weakness  PSYCH: Denies depression, anxiety    VITALS:  T(F): 96.2, Max: 101.1 (05-27-21 @ 18:21)  HR: 86  BP: 124/55  RR: 18Vital Signs Last 24 Hrs  T(C): 35.7 (28 May 2021 14:34), Max: 38.4 (27 May 2021 18:21)  T(F): 96.2 (28 May 2021 14:34), Max: 101.1 (27 May 2021 18:21)  HR: 86 (28 May 2021 14:34) (80 - 95)  BP: 124/55 (28 May 2021 14:34) (115/64 - 127/67)  BP(mean): --  RR: 18 (28 May 2021 14:34) (18 - 19)  SpO2: --    PHYSICAL EXAM:  Gen: NAD, resting in bed  HEENT: Normocephalic, atraumatic  Neck: supple, no lymphadenopathy  CV: Regular rate & regular rhythm  Lungs: decreased BS at bases, no fremitus  Abdomen: Soft, BS present  Ext: Warm, well perfused  Neuro: non focal, awake  Skin: no rash, no erythema  Lines: no phlebitis    FH: Non-contributory  Social Hx: Non-contributory    TESTS & MEASUREMENTS:                        9.2    8.86  )-----------( 466      ( 28 May 2021 05:52 )             31.2     05-28    137  |  100  |  12  ----------------------------<  210<H>  5.0   |  29  |  0.8    Ca    8.1<L>      28 May 2021 05:52  Phos  2.8     05-28  Mg     1.9     05-28    TPro  6.5  /  Alb  2.7<L>  /  TBili  0.9  /  DBili  x   /  AST  15  /  ALT  12  /  AlkPhos  86  05-28    eGFR if Non African American: 95 mL/min/1.73M2 (05-28-21 @ 05:52)  eGFR if African American: 110 mL/min/1.73M2 (05-28-21 @ 05:52)    LIVER FUNCTIONS - ( 28 May 2021 05:52 )  Alb: 2.7 g/dL / Pro: 6.5 g/dL / ALK PHOS: 86 U/L / ALT: 12 U/L / AST: 15 U/L / GGT: x               Culture - Aspirate with Gram Stain (collected 05-24-21 @ 14:00)  Source: Joint Fl None  Preliminary Report (05-25-21 @ 21:33):    Few Pseudomonas aeruginosa  Organism: Pseudomonas aeruginosa (05-26-21 @ 20:37)  Organism: Pseudomonas aeruginosa (05-26-21 @ 20:37)      -  Amikacin: S <=16      -  Aztreonam: S <=4      -  Cefepime: S 8      -  Ceftazidime: S 4      -  Ciprofloxacin: S <=0.25      -  Gentamicin: S 4      -  Imipenem: S 2      -  Levofloxacin: S <=0.5      -  Meropenem: S 2      -  Piperacillin/Tazobactam: S <=8      -  Tobramycin: S <=2      Method Type: YOLIE    Culture - Body Fluid with Gram Stain (collected 05-20-21 @ 16:57)  Source: .Body Fluid Synovial Fluid  Gram Stain (05-21-21 @ 07:48):    No polymorphonuclear leukocytes seen    No organisms seen    by cytocentrifuge  Final Report (05-25-21 @ 17:37):    No growth at 5 days Culture in progress    Culture - Acid Fast - Body Fluid w/Smear (collected 05-11-21 @ 11:54)  Source: .Body Fluid Knee Fluid  Preliminary Report (05-19-21 @ 15:03):    No growth at 1 week.    Culture - Body Fluid with Gram Stain (collected 05-09-21 @ 14:10)  Source: .Body Fluid Synovial Fluid  Gram Stain (05-09-21 @ 23:07):    Numerous polymorphonuclear leukocytes per low power field    No organisms seen per oil power field  Final Report (05-23-21 @ 16:42):    No growth at 14 days.    Culture - Blood (collected 05-04-21 @ 05:33)  Source: .Blood None  Final Report (05-09-21 @ 18:00):    No Growth Final    Culture - Blood (collected 05-03-21 @ 07:47)  Source: .Blood None  Final Report (05-08-21 @ 18:00):    No Growth Final    Culture - Blood (collected 05-02-21 @ 06:11)  Source: .Blood None  Final Report (05-07-21 @ 18:01):    No Growth Final    Culture - Body Fluid with Gram Stain (collected 05-01-21 @ 16:43)  Source: Bile biliary drain  Gram Stain (05-02-21 @ 23:26):    Numerous polymorphonuclear leukocytes per low power field    No organisms seen per oil power field  Final Report (05-07-21 @ 17:49):    Few Pseudomonas aeruginosa    Few Enterococcus faecium (vancomycin resistant)  Organism: Pseudomonas aeruginosa  Enterococcus faecium (vancomycin resistant) (05-07-21 @ 17:49)  Organism: Enterococcus faecium (vancomycin resistant) (05-07-21 @ 17:49)      -  Ampicillin: R >8 Predicts results to ampicillin/sulbactam, amoxacillin-clavulanate and  piperacillin-tazobactam.      -  Daptomycin: SDD 4 The breakpoint for SDD (sensitive dose dependent)is based on a dosage regimen of 8-12 mg/kg administered every 24 h in adults and is intended for serious infections due to E. faecium. Consultation with an infectious diseases specialist is recommended.      -  Levofloxacin: R >4      -  Linezolid: S 1      -  Tetra/Doxy: S <=1      -  Vancomycin: R >16      Method Type: YOLIE  Organism: Pseudomonas aeruginosa (05-07-21 @ 17:49)      -  Amikacin: S <=16      -  Aztreonam: S <=4      -  Cefepime: S <=2      -  Ceftazidime: S 4      -  Ciprofloxacin: S <=0.25      -  Gentamicin: S 4      -  Imipenem: S <=1      -  Levofloxacin: S <=0.5      -  Meropenem: S <=1      -  Piperacillin/Tazobactam: S <=8      -  Tobramycin: S <=2      Method Type: YOLIE    Culture - Blood (collected 05-01-21 @ 06:49)  Source: .Blood None  Final Report (05-06-21 @ 18:00):    No Growth Final    Culture - Blood (collected 05-01-21 @ 06:49)  Source: .Blood None  Final Report (05-06-21 @ 18:00):    No Growth Final    Culture - Blood (collected 04-30-21 @ 17:37)  Source: .Blood None  Final Report (05-05-21 @ 23:00):    No Growth Final    Culture - Blood (collected 04-30-21 @ 11:15)  Source: .Blood None  Final Report (05-05-21 @ 23:00):    No Growth Final            INFECTIOUS DISEASES TESTING  COVID-19 PCR: NotDetec (05-25-21 @ 09:04)  COVID-19 PCR: NotDetec (05-02-21 @ 08:52)  COVID-19 PCR: Detected (04-29-21 @ 14:33)  COVID-19 PCR: NotDetec (04-26-21 @ 06:00)  COVID-19 PCR: NotDetec (03-12-21 @ 11:00)  COVID-19 PCR: Detected (02-05-21 @ 13:47)  COVID-19 PCR: NotDetec (10-13-20 @ 09:08)  COVID-19 PCR: NotDetec (10-03-20 @ 19:54)  COVID-19 PCR: NotDetec (09-01-20 @ 20:40)  COVID-19 PCR: NotDetec (07-30-20 @ 07:43)  COVID-19 PCR: NotDetec (07-16-20 @ 19:46)      INFLAMMATORY MARKERS  Sedimentation Rate, Erythrocyte: 127 mm/Hr (05-18-21 @ 06:36)  C-Reactive Protein, Serum: 59 mg/L (05-18-21 @ 06:36)  Sedimentation Rate, Erythrocyte: 106 mm/Hr (05-15-21 @ 08:56)  C-Reactive Protein, Serum: 81 mg/L (05-15-21 @ 08:56)      RADIOLOGY & ADDITIONAL TESTS:  I have personally reviewed the last available Chest xray  CXR      CT      CARDIOLOGY TESTING      MEDICATIONS  aspirin  chewable 81 Oral daily  dextrose 40% Gel 15 Oral once  dextrose 5%. 1000 IV Continuous <Continuous>  dextrose 5%. 1000 IV Continuous <Continuous>  dextrose 50% Injectable 25 IV Push once  dextrose 50% Injectable 12.5 IV Push once  dextrose 50% Injectable 25 IV Push once  DULoxetine 90 Oral daily  enoxaparin Injectable 40 SubCutaneous daily  gabapentin 600 Oral three times a day  glucagon  Injectable 1 IntraMuscular once  insulin glargine Injectable (LANTUS) 20 SubCutaneous every morning  insulin lispro (ADMELOG) corrective regimen sliding scale  SubCutaneous three times a day before meals  insulin lispro Injectable (ADMELOG) 3 SubCutaneous three times a day before meals  nafcillin  IVPB 2 IV Intermittent every 4 hours  oxyCODONE  ER Tablet 10 Oral every 12 hours  prasugrel 10 Oral daily      WEIGHT  Weight (kg): 81 (05-26-21 @ 11:17)  Creatinine, Serum: 0.8 mg/dL (05-28-21 @ 05:52)      ANTIBIOTICS:  nafcillin  IVPB 2 Gram(s) IV Intermittent every 4 hours      All available historical records have been reviewed

## 2021-05-28 NOTE — PROGRESS NOTE ADULT - ASSESSMENT
ASSESSMENT  65 year old male with PMH of CAD s/p MI, PCI/CABG, CHFrEF (15-20%), has ICD, HTN, celiac disease, DM, neuropathy, chronic b/l LE ulcers presents, severe chronic pain,  hx infected R TKR with MRSA (was eventually cemented so has limited ROM R knee), and history of cholecystostomy tube placement in February 2020 for acute cholecystitis s/p removal in May 2020 with multiple presentations including persistent bilious drainage from the prior tract site as well as a RUQ abdominal abscess at the site s/p drainage who presents with left knee pain    IMPRESSION  #Septic Arthritis of left knee with MSSA bacteremia  - s/p arthrocentesis of left knee - consistent with baterial septic infection   - s/p OR washout 4/26 -- purulent fluid in left knee with significant tricompartmental arthritis   - Blood Cx 4/26 MSSA  - OR Cx 4/26 MSSA  - Blood Cx 4/27 NG, 4/28 growing Staph   - Blood Cx 4/30-5/4 NG  - JOHN PAUL 5/5: no evidence of valvular or lead vegation; noted mild, non-mobile atheroma seen in the thoracic aorta.   - MR Knee w/wo IV Cont, Left (05.06.21 @ 19:05): Septic arthritis of the knee with osteomyelitis in the distal femur and proximal tibia. Associated large joint effusion with extensive synovitis. Noadditional collection identified.  - s/p knee tap 5/9 -- 15 cc of blood fluid; WBC 69449 (95% PMN)  - s/p knee tap 5/11 WBC 66106 (97% PMN)  - s/p Arthroscopic drainage of knee 5/27 -- no purulence but noted large organized hematoma -- antibiotic beads placed       #Biliary Drainage from previous perc sudhir site  - Previous Intraabdominal 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 Decreased area of right upper quadrant subcutaneous stranding at site of prior percutaneous cholecystostomy, without evidence of abscess.  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    #CHF s/p ICD  #DM   #Abx allergy: carvedilol (Other)  enalapril (Hives)  Entresto (Other)    Creatinine, Serum: 1.0 mg/dL (04.25.21 @ 22:15)  Weight (kg): 81 (26 Apr 2021 17:23)    RECOMMENDATIONS  - fevers likely post-op -- repeat blood cultures if febrile over weekend  - will follow-up OR cultures 5/27  - continue nafcillin 2g q 4 hours  - follow-up vascular consult   - noted wound Cx 5/24 - labeled as joint fluid although taken from abdominal drainage -- this is likely a colonizer, would not target at this point   - eventually will need 6 weeks from time of last debridement (5/27) -- to finish with cefazolin 2g q 8 hours, pending OR Cx from 5/27     Please call or message on [employer] Teams if with any questions.  Spectra [phone]

## 2021-05-28 NOTE — PROGRESS NOTE ADULT - PROVIDER SPECIALTY LIST ADULT
Orthopedics Cimzia Pregnancy And Lactation Text: This medication crosses the placenta but can be considered safe in certain situations. Cimzia may be excreted in breast milk.

## 2021-05-28 NOTE — PROGRESS NOTE ADULT - SUBJECTIVE AND OBJECTIVE BOX
Orthopaedic Surgery Progress Note  MRN-[id number]    S&E at bedside this AM s/p I&D yesterday . Pain well controlled. Denies fever/chills/CP/SOB. No other complaints.  Drains in place, minimal bloody collections in both.  knee immobilizer in place  poor perfusion to bilateral lower extremities      T(C): 36.3 (05-28-21 @ 04:48), Max: 38.4 (05-27-21 @ 18:21)  HR: 80 (05-28-21 @ 04:48) (80 - 95)  BP: 115/64 (05-28-21 @ 04:48) (115/64 - 127/67)  RR: 18 (05-28-21 @ 04:48) (18 - 19)  SpO2: --      Labs                        9.2    8.86  )-----------( 466      ( 28 May 2021 05:52 )             31.2     05-28    137  |  100  |  12  ----------------------------<  210<H>  5.0   |  29  |  0.8    Ca    8.1<L>      28 May 2021 05:52  Phos  2.8     05-28  Mg     1.9     05-28    TPro  6.5  /  Alb  2.7<L>  /  TBili  0.9  /  DBili  x   /  AST  15  /  ALT  12  /  AlkPhos  86  05-28    LIVER FUNCTIONS - ( 28 May 2021 05:52 )  Alb: 2.7 g/dL / Pro: 6.5 g/dL / ALK PHOS: 86 U/L / ALT: 12 U/L / AST: 15 U/L / GGT: x           PT/INR - ( 27 May 2021 05:54 )   PT: 17.10 sec;   INR: 1.49 ratio         PTT - ( 27 May 2021 05:54 )  PTT:32.3 sec      A/P:   PENDING VASCULAR CONSULT  Weightbearing: WBAT  Drains to remain x1 week   DVT ppx: SCD, lvx  Abx: cont naficillin as per ID  f/u intra op cultures  Pain control  Home meds  Trend labs  PT/OT/Rehab

## 2021-05-28 NOTE — PROGRESS NOTE ADULT - SUBJECTIVE AND OBJECTIVE BOX
Sub: Patient endorsed having a moderate amount of pain order wise no other complaint      OBJ:   Vital Signs Last 24 Hrs  T(C): 35.7 (28 May 2021 14:34), Max: 38.4 (27 May 2021 18:21)  T(F): 96.2 (28 May 2021 14:34), Max: 101.1 (27 May 2021 18:21)  HR: 86 (28 May 2021 14:34) (80 - 95)  BP: 124/55 (28 May 2021 14:34) (115/64 - 127/67)  BP(mean): --  RR: 18 (28 May 2021 14:34) (18 - 19)  SpO2: --      LABS:                                   9.2    8.86  )-----------( 466      ( 28 May 2021 05:52 )             31.2   05-28    137  |  100  |  12  ----------------------------<  210<H>  5.0   |  29  |  0.8    Ca    8.1<L>      28 May 2021 05:52  Phos  2.8     05-28  Mg     1.9     05-28    TPro  6.5  /  Alb  2.7<L>  /  TBili  0.9  /  DBili  x   /  AST  15  /  ALT  12  /  AlkPhos  86  05-28      PHYSICAL EXAM:  Gen: NAD, resting in bed  HEENT: Normocephalic, atraumatic  Neck: supple, no lymphadenopathy  CV: Regular rate & regular rhythm  Lungs: decreased BS at bases, no fremitus  Abdomen: Soft, BS present  Ext: Warm, well wrapped with ACE post surgery. Knee brace in left knee  Neuro: non focal, awake  Skin: no rash, no erythema  Lines: no phlebitis

## 2021-05-29 LAB
ALBUMIN SERPL ELPH-MCNC: 2.7 G/DL — LOW (ref 3.5–5.2)
ALP SERPL-CCNC: 81 U/L — SIGNIFICANT CHANGE UP (ref 30–115)
ALT FLD-CCNC: 14 U/L — SIGNIFICANT CHANGE UP (ref 0–41)
ANION GAP SERPL CALC-SCNC: 12 MMOL/L — SIGNIFICANT CHANGE UP (ref 7–14)
AST SERPL-CCNC: 18 U/L — SIGNIFICANT CHANGE UP (ref 0–41)
BILIRUB SERPL-MCNC: 0.6 MG/DL — SIGNIFICANT CHANGE UP (ref 0.2–1.2)
BUN SERPL-MCNC: 14 MG/DL — SIGNIFICANT CHANGE UP (ref 10–20)
CALCIUM SERPL-MCNC: 8.3 MG/DL — LOW (ref 8.5–10.1)
CHLORIDE SERPL-SCNC: 100 MMOL/L — SIGNIFICANT CHANGE UP (ref 98–110)
CO2 SERPL-SCNC: 26 MMOL/L — SIGNIFICANT CHANGE UP (ref 17–32)
CREAT SERPL-MCNC: 0.8 MG/DL — SIGNIFICANT CHANGE UP (ref 0.7–1.5)
CULTURE RESULTS: SIGNIFICANT CHANGE UP
GLUCOSE BLDC GLUCOMTR-MCNC: 216 MG/DL — HIGH (ref 70–99)
GLUCOSE BLDC GLUCOMTR-MCNC: 275 MG/DL — HIGH (ref 70–99)
GLUCOSE SERPL-MCNC: 203 MG/DL — HIGH (ref 70–99)
HCT VFR BLD CALC: 32.4 % — LOW (ref 42–52)
HGB BLD-MCNC: 9.6 G/DL — LOW (ref 14–18)
MAGNESIUM SERPL-MCNC: 1.9 MG/DL — SIGNIFICANT CHANGE UP (ref 1.8–2.4)
MCHC RBC-ENTMCNC: 22.3 PG — LOW (ref 27–31)
MCHC RBC-ENTMCNC: 29.6 G/DL — LOW (ref 32–37)
MCV RBC AUTO: 75.2 FL — LOW (ref 80–94)
NRBC # BLD: 0 /100 WBCS — SIGNIFICANT CHANGE UP (ref 0–0)
ORGANISM # SPEC MICROSCOPIC CNT: SIGNIFICANT CHANGE UP
ORGANISM # SPEC MICROSCOPIC CNT: SIGNIFICANT CHANGE UP
PHOSPHATE SERPL-MCNC: 2.7 MG/DL — SIGNIFICANT CHANGE UP (ref 2.1–4.9)
PLATELET # BLD AUTO: 436 K/UL — HIGH (ref 130–400)
POTASSIUM SERPL-MCNC: 4.7 MMOL/L — SIGNIFICANT CHANGE UP (ref 3.5–5)
POTASSIUM SERPL-SCNC: 4.7 MMOL/L — SIGNIFICANT CHANGE UP (ref 3.5–5)
PROT SERPL-MCNC: 6.7 G/DL — SIGNIFICANT CHANGE UP (ref 6–8)
RBC # BLD: 4.31 M/UL — LOW (ref 4.7–6.1)
RBC # FLD: 25.2 % — HIGH (ref 11.5–14.5)
SODIUM SERPL-SCNC: 138 MMOL/L — SIGNIFICANT CHANGE UP (ref 135–146)
SPECIMEN SOURCE: SIGNIFICANT CHANGE UP
WBC # BLD: 10.97 K/UL — HIGH (ref 4.8–10.8)
WBC # FLD AUTO: 10.97 K/UL — HIGH (ref 4.8–10.8)

## 2021-05-29 PROCEDURE — 99232 SBSQ HOSP IP/OBS MODERATE 35: CPT

## 2021-05-29 PROCEDURE — 93925 LOWER EXTREMITY STUDY: CPT | Mod: 26

## 2021-05-29 RX ORDER — MORPHINE SULFATE 50 MG/1
2 CAPSULE, EXTENDED RELEASE ORAL EVERY 4 HOURS
Refills: 0 | Status: DISCONTINUED | OUTPATIENT
Start: 2021-05-29 | End: 2021-06-05

## 2021-05-29 RX ADMIN — GABAPENTIN 600 MILLIGRAM(S): 400 CAPSULE ORAL at 20:00

## 2021-05-29 RX ADMIN — NAFCILLIN 200 GRAM(S): 10 INJECTION, POWDER, FOR SOLUTION INTRAVENOUS at 05:27

## 2021-05-29 RX ADMIN — ENOXAPARIN SODIUM 40 MILLIGRAM(S): 100 INJECTION SUBCUTANEOUS at 12:13

## 2021-05-29 RX ADMIN — NAFCILLIN 200 GRAM(S): 10 INJECTION, POWDER, FOR SOLUTION INTRAVENOUS at 01:00

## 2021-05-29 RX ADMIN — NAFCILLIN 200 GRAM(S): 10 INJECTION, POWDER, FOR SOLUTION INTRAVENOUS at 14:03

## 2021-05-29 RX ADMIN — OXYCODONE HYDROCHLORIDE 5 MILLIGRAM(S): 5 TABLET ORAL at 05:27

## 2021-05-29 RX ADMIN — Medication 3 UNIT(S): at 08:12

## 2021-05-29 RX ADMIN — MORPHINE SULFATE 2 MILLIGRAM(S): 50 CAPSULE, EXTENDED RELEASE ORAL at 21:48

## 2021-05-29 RX ADMIN — GABAPENTIN 600 MILLIGRAM(S): 400 CAPSULE ORAL at 21:46

## 2021-05-29 RX ADMIN — OXYCODONE HYDROCHLORIDE 10 MILLIGRAM(S): 5 TABLET ORAL at 20:18

## 2021-05-29 RX ADMIN — MORPHINE SULFATE 2 MILLIGRAM(S): 50 CAPSULE, EXTENDED RELEASE ORAL at 17:26

## 2021-05-29 RX ADMIN — OXYCODONE HYDROCHLORIDE 10 MILLIGRAM(S): 5 TABLET ORAL at 05:27

## 2021-05-29 RX ADMIN — PRASUGREL 10 MILLIGRAM(S): 5 TABLET, FILM COATED ORAL at 12:12

## 2021-05-29 RX ADMIN — NAFCILLIN 200 GRAM(S): 10 INJECTION, POWDER, FOR SOLUTION INTRAVENOUS at 09:43

## 2021-05-29 RX ADMIN — GABAPENTIN 600 MILLIGRAM(S): 400 CAPSULE ORAL at 05:27

## 2021-05-29 RX ADMIN — DULOXETINE HYDROCHLORIDE 90 MILLIGRAM(S): 30 CAPSULE, DELAYED RELEASE ORAL at 12:10

## 2021-05-29 RX ADMIN — Medication 2: at 17:01

## 2021-05-29 RX ADMIN — Medication 3 UNIT(S): at 12:04

## 2021-05-29 RX ADMIN — Medication 3: at 12:04

## 2021-05-29 RX ADMIN — MORPHINE SULFATE 2 MILLIGRAM(S): 50 CAPSULE, EXTENDED RELEASE ORAL at 12:00

## 2021-05-29 RX ADMIN — NAFCILLIN 200 GRAM(S): 10 INJECTION, POWDER, FOR SOLUTION INTRAVENOUS at 21:47

## 2021-05-29 RX ADMIN — Medication 2: at 08:21

## 2021-05-29 RX ADMIN — NAFCILLIN 200 GRAM(S): 10 INJECTION, POWDER, FOR SOLUTION INTRAVENOUS at 18:04

## 2021-05-29 RX ADMIN — Medication 81 MILLIGRAM(S): at 12:07

## 2021-05-29 RX ADMIN — INSULIN GLARGINE 20 UNIT(S): 100 INJECTION, SOLUTION SUBCUTANEOUS at 09:39

## 2021-05-29 RX ADMIN — CYCLOBENZAPRINE HYDROCHLORIDE 5 MILLIGRAM(S): 10 TABLET, FILM COATED ORAL at 21:46

## 2021-05-29 NOTE — PROGRESS NOTE ADULT - ASSESSMENT
A/P:    64 yo male admitted with left septic knee and severe PVD    --Left septic knee with synovitis: S/P left knee wash out POD #2 by ortho. Doing well. WBAT. Drains to remain x1 week. Will continue SCD and lovenox, nafcillin. Awaiting intra operative cx result. Pain control and lab monitoring.PT/OT        A/P:   Weightbearing: WBAT  Drains to remain x1 week   DVT ppx: SCD, lvx  Abx: cont naficillin as per ID  f/u intra op cultures  -appreciate vascular recs:  need duplex; ortho will take down dressing to allow for duplex only when patient is absolutely agreeable to the procedure and will not refuse  Pain control  Home meds  Trend labs  PT/OT/Rehab          --PVSD:  Vascular consulted via Dr Nelson. Duplex is pending. Rec will be followed  including resuming statin. 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 EF of 15%. On Nafcillin for septic knee. ? stent placement  pending critical perfusion inhibition order wise, the risk of infection from the septic knee over weighs the risk of dissemination. Will defer to [facility]ascular   --MSSA Bacteremia: On Nafcillin . Will continue per ID Rec.   --Pseudomonas Aeruginosa infection: From the biliary drainage site. Likely colonization  --Hyperkalemia: Corrected  --DM2: Will increase insulin regimen to 20 units of Lantus qam, will add 3 units of lispro ac and continue corrective scale   --Anemia: Iron  def combined with ACD  --CAD: ASA, statin, Toprol and Prasugrel  --CHF with reduced EF: Aldactone and Lasix  --Covid vaccine status: He has received one dose and is supposed to receive the second dose on 5/15/21 but he was already hospitalized. ID on board and will appreciate their input on that.    --DVT ppx: Lovenox, prasugrel and ASA      #Progress Note Handoff: Pending ortho clearance and vascular doppler review . Midline placement for prolonged abx administration. Will reconsult PT    Pending (specify):  Consults________, Tests________, Test Results_______, Other_________  Family discussion:  Disposition: Home___/SNF_x__/Other________/Unknown at this time________                       Weightbearing: WBAT  Drains to remain x1 week   DVT ppx: SCD, lvx  Abx: cont naficillin as per ID  f/u intra op cultures  -appreciate vascular recs:  need duplex; ortho will take down dressing to allow for duplex only when patient is absolutely agreeable to the procedure and will not refuse  Pain control  Home meds  Trend labs  PT/OT/Rehab A/P:    66 yo male admitted with left septic knee and severe PVD    --Left septic knee with synovitis: S/P left knee wash out POD #2 by ortho. Doing well. WBAT. Drains to remain x1 week. Will continue SCD and lovenox, nafcillin. Awaiting intra operative cx result. Pain control and lab monitoring.PT/OT  --PVSD:  Patient refused duplex this morning since he said his pain is not controlled. Will add morphine 2 mg iv every 4 hours prn with paramenters to the already implimented pain regimen (including IR and ED oxycodone, gabapentin, baclofen,  and cymbalta)        Vascular consulted via Dr Nelson. Duplex is pending. Rec will be followed  including resuming statin. 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 EF of 15%. On Nafcillin for septic knee. ? stent placement  pending critical perfusion inhibition order wise, the risk of infection from the septic knee over weighs the risk of dissemination. Will defer to vaascular   --MSSA Bacteremia: On Nafcillin . Will continue per ID Rec.   --Pseudomonas Aeruginosa infection: From the biliary drainage site. Likely colonization  --Hyperkalemia: Corrected  --DM2: Will increase insulin regimen to 20 units of Lantus qam, will add 3 units of lispro ac and continue corrective scale   --Anemia: Iron  def combined with ACD  --CAD: ASA, statin, Toprol and Prasugrel  --CHF with reduced EF: Aldactone and Lasix  --Covid vaccine status: He has received one dose and is supposed to receive the second dose on 5/15/21 but he was already hospitalized. ID on board and will appreciate their input on that.    --DVT ppx: Lovenox, prasugrel and ASA      #Progress Note Handoff: Pending ortho clearance and vascular doppler review . Midline placement for prolonged abx administration. Will reconsult PT    Pending (specify):  Consults________, Tests________, Test Results_______, Other_________  Family discussion:  Disposition: Home___/SNF_x__/Other________/Unknown at this time________                       Weightbearing: WBAT  Drains to remain x1 week   DVT ppx: SCD, lvx  Abx: cont naficillin as per ID  f/u intra op cultures  -appreciate vascular recs:  need duplex; ortho will take down dressing to allow for duplex only when patient is absolutely agreeable to the procedure and will not refuse  Pain control  Home meds  Trend labs  PT/OT/Rehab A/P:    64 yo male admitted with left septic knee and severe PVD    --Left septic knee with synovitis: S/P left knee wash out POD #2 by ortho. Doing well. WBAT. Drains to remain x1 week. Will continue SCD and lovenox, nafcillin. Awaiting intra operative cx result. Pain control and lab monitoring.PT/OT  --PVSD:  Patient refused duplex this morning since he said his pain is not controlled. Will add morphine 2 mg iv every 4 hours prn with paramenters to the already implimented pain regimen (including IR and ED oxycodone, gabapentin, flexeril, Tylenol and Cymbalta). Cardio vascular on board and well appreciated.  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 EF of 15%. On Nafcillin for septic knee. ? stent placement  pending critical perfusion inhibition order wise, the risk of infection from the septic knee over weighs the risk of dissemination. Will defer to [facility]   --MSSA Bacteremia: On Nafcillin . Will continue per ID Rec.   --Pseudomonas Aeruginosa infection: From the biliary drainage site. Likely colonization  --Hyperkalemia: Corrected  --DM2: Will increase insulin regimen to 20 units of Lantus qam, will add 3 units of lispro ac and continue corrective scale   --Anemia: Iron  def combined with ACD  --CAD: ASA, statin, Toprol and Prasugrel  --CHF with reduced EF: Aldactone and Lasix  --Covid vaccine status: He has received one dose and is supposed to receive the second dose on 5/15/21 but he was already hospitalized. ID on board and will appreciate their input on that.    --DVT ppx: Lovenox, prasugrel and ASA      #Progress Note Handoff: Pending ortho clearance and vascular doppler review . Midline placement for prolonged abx administration. Will reconsult PT    Pending (specify):  Consults________, Tests________, Test Results_______, Other_________  Family discussion:  Disposition: Home___/SNF_x__/Other________/Unknown at this time________                   A/P:    64 yo male admitted with left septic knee and severe PVD    --Left septic knee with synovitis: S/P left knee wash out POD #2 by ortho. Doing well. WBAT. Drains to remain x1 week. Will continue SCD and Lovenox nafcillin. Awaiting intra operative cx result. Pain control and lab monitoring. PT/OT  --Leukocytosis: wbc of 10,000.  No fever. Most likely pain related. intra operative culture result has NGTD. Will obtain esr and crp while monitoring  --PVSD:  Patient refused duplex this morning since he said his pain is not controlled. Will add morphine 2 mg iv every 4 hours prn with parameters to the already implemented pain regimen (including IR and ED oxycodone, gabapentin, flexeril, Tylenol and Cymbalta). Cardio vascular on board and well appreciated.  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 EF of 15%. On Nafcillin for septic knee. ? stent placement  pending critical perfusion inhibition order wise, the risk of infection from the septic knee over weighs the risk of dissemination. Will defer to vascular   --MSSA Bacteremia: On Nafcillin . Will continue per ID Rec.   --Pseudomonas Aeruginosa infection: From the biliary drainage site. Likely colonization  --Hyperkalemia: Corrected  --DM2: Will increase insulin regimen to 20 units of Lantus qam, will add 3 units of lispro ac and continue corrective scale   --Anemia: Iron  def combined with ACD  --CAD: ASA, statin, Toprol and Prasugrel  --CHF with reduced EF: Aldactone and Lasix  --Covid vaccine status: He has received one dose and is supposed to receive the second dose on 5/15/21 but he was already hospitalized. ID on board and will appreciate their input on that.    --DVT ppx: Lovenox, prasugrel and ASA      #Progress Note Handoff: Pending ortho clearance and vascular doppler review . Midline placement for prolonged abx administration. Will reconsult PT    Pending (specify):  Consults________, Tests________, Test Results_______, Other_________  Family discussion:  Disposition: Home___/SNF_x__/Other________/Unknown at this time________

## 2021-05-29 NOTE — PROGRESS NOTE ADULT - SUBJECTIVE AND OBJECTIVE BOX
Orthopaedics Progress Note    FLOWER MARISCAL  [id number]    S&E at bedside this AM s/p I&D yesterday . Pain well controlled. Denies fever/chills/CP/SOB. Ortho took down dressing yesterday to allow for duplex, however patient Refused lower extremity duplex yesterday.      acetaminophen   Tablet .. 650 milliGRAM(s) Oral every 8 hours PRN  aspirin  chewable 81 milliGRAM(s) Oral daily  cyclobenzaprine 5 milliGRAM(s) Oral two times a day PRN  dextrose 40% Gel 15 Gram(s) Oral once  dextrose 5%. 1000 milliLiter(s) IV Continuous <Continuous>  dextrose 5%. 1000 milliLiter(s) IV Continuous <Continuous>  dextrose 50% Injectable 25 Gram(s) IV Push once  dextrose 50% Injectable 12.5 Gram(s) IV Push once  dextrose 50% Injectable 25 Gram(s) IV Push once  DULoxetine 90 milliGRAM(s) Oral daily  enoxaparin Injectable 40 milliGRAM(s) SubCutaneous daily  gabapentin 600 milliGRAM(s) Oral three times a day  glucagon  Injectable 1 milliGRAM(s) IntraMuscular once  insulin glargine Injectable (LANTUS) 20 Unit(s) SubCutaneous every morning  insulin lispro (ADMELOG) corrective regimen sliding scale   SubCutaneous three times a day before meals  insulin lispro Injectable (ADMELOG) 3 Unit(s) SubCutaneous three times a day before meals  nafcillin  IVPB 2 Gram(s) IV Intermittent every 4 hours  oxyCODONE    IR 5 milliGRAM(s) Oral every 4 hours PRN  oxyCODONE  ER Tablet 10 milliGRAM(s) Oral every 12 hours  prasugrel 10 milliGRAM(s) Oral daily      T(C): 36.6 (05-29-21 @ 05:44), Max: 36.6 (05-29-21 @ 05:44)  HR: 92 (05-29-21 @ 05:44) (86 - 93)  BP: 121/65 (05-29-21 @ 05:44) (120/66 - 124/55)  RR: 18 (05-29-21 @ 05:44) (18 - 18)  SpO2: --    Physical Exam  NAD, resting comfortably  Breathing comfortably on RA    LLE  Drains in place, minimal bloody collections in both.  knee immobilizer in place  poor perfusion to bilateral lower extremities    Labs                        9.2    8.86  )-----------( 466      ( 28 May 2021 05:52 )             31.2     05-28    137  |  100  |  12  ----------------------------<  210<H>  5.0   |  29  |  0.8    Ca    8.1<L>      28 May 2021 05:52  Phos  2.8     05-28  Mg     1.9     05-28    TPro  6.5  /  Alb  2.7<L>  /  TBili  0.9  /  DBili  x   /  AST  15  /  ALT  12  /  AlkPhos  86  05-28    LIVER FUNCTIONS - ( 28 May 2021 05:52 )  Alb: 2.7 g/dL / Pro: 6.5 g/dL / ALK PHOS: 86 U/L / ALT: 12 U/L / AST: 15 U/L / GGT: x               A/P:   Weightbearing: WBAT  Drains to remain x1 week   DVT ppx: SCD, lvx  Abx: cont naficillin as per ID  f/u intra op cultures  -appreciate vascular recs:  need duplex; ortho will take down dressing to allow for duplex only when patient is absolutely agreeable to the procedure and will not refuse  Pain control  Home meds  Trend labs  PT/OT/Rehab

## 2021-05-29 NOTE — PROGRESS NOTE ADULT - SUBJECTIVE AND OBJECTIVE BOX
Sub: Patient endorsed having a moderate amount of pain order wise no other complaint. Refused duplex ultrasound today      OBJ:     Vital Signs Last 24 Hrs  T(C): 36.6 (29 May 2021 05:44), Max: 36.6 (29 May 2021 05:44)  T(F): 97.8 (29 May 2021 05:44), Max: 97.8 (29 May 2021 05:44)  HR: 92 (29 May 2021 05:44) (86 - 93)  BP: 121/65 (29 May 2021 05:44) (120/66 - 124/55)  BP(mean): --  RR: 18 (29 May 2021 05:44) (18 - 18)  SpO2: --      LABS:                      05-29    138  |  100  |  14  ----------------------------<  203<H>  4.7   |  26  |  0.8    Ca    8.3<L>      29 May 2021 05:34  Phos  2.7     05-29  Mg     1.9     05-29    TPro  6.7  /  Alb  2.7<L>  /  TBili  0.6  /  DBili  x   /  AST  18  /  ALT  14  /  AlkPhos  81  05-29                        9.6    10.97 )-----------( 436      ( 29 May 2021 05:34 )             32.4         PHYSICAL EXAM:  Gen: NAD, resting in bed  HEENT: Normocephalic, atraumatic  Neck: supple, no lymphadenopathy  CV: Regular rate & regular rhythm  Lungs: decreased BS at bases, no fremitus  Abdomen: Soft, BS present  Ext: Warm, well wrapped with ACE post surgery. Knee brace in left knee  Neuro: non focal, awake  Skin: no rash, no erythema  Lines: no phlebitis

## 2021-05-29 NOTE — PROGRESS NOTE ADULT - ATTENDING COMMENTS
Pt. seen/ examined  Labs/ vitals reviewed  Output not documented, will follow (30ml/20ml per nurse)  Arterial doppler done, will follow results  Continue antibiotics    Note: Culture from 5/24/21 is NOT joint fluid, but was taken from abdominal draining sinus

## 2021-05-30 LAB
ALBUMIN SERPL ELPH-MCNC: 2.7 G/DL — LOW (ref 3.5–5.2)
ALP SERPL-CCNC: 78 U/L — SIGNIFICANT CHANGE UP (ref 30–115)
ALT FLD-CCNC: 14 U/L — SIGNIFICANT CHANGE UP (ref 0–41)
ANION GAP SERPL CALC-SCNC: 9 MMOL/L — SIGNIFICANT CHANGE UP (ref 7–14)
AST SERPL-CCNC: 19 U/L — SIGNIFICANT CHANGE UP (ref 0–41)
BILIRUB SERPL-MCNC: 0.6 MG/DL — SIGNIFICANT CHANGE UP (ref 0.2–1.2)
BUN SERPL-MCNC: 11 MG/DL — SIGNIFICANT CHANGE UP (ref 10–20)
CALCIUM SERPL-MCNC: 8.4 MG/DL — LOW (ref 8.5–10.1)
CHLORIDE SERPL-SCNC: 98 MMOL/L — SIGNIFICANT CHANGE UP (ref 98–110)
CO2 SERPL-SCNC: 29 MMOL/L — SIGNIFICANT CHANGE UP (ref 17–32)
CREAT SERPL-MCNC: 0.7 MG/DL — SIGNIFICANT CHANGE UP (ref 0.7–1.5)
GLUCOSE BLDC GLUCOMTR-MCNC: 143 MG/DL — HIGH (ref 70–99)
GLUCOSE BLDC GLUCOMTR-MCNC: 173 MG/DL — HIGH (ref 70–99)
GLUCOSE BLDC GLUCOMTR-MCNC: 181 MG/DL — HIGH (ref 70–99)
GLUCOSE BLDC GLUCOMTR-MCNC: 240 MG/DL — HIGH (ref 70–99)
GLUCOSE SERPL-MCNC: 157 MG/DL — HIGH (ref 70–99)
HCT VFR BLD CALC: 30.6 % — LOW (ref 42–52)
HGB BLD-MCNC: 9.1 G/DL — LOW (ref 14–18)
MAGNESIUM SERPL-MCNC: 1.8 MG/DL — SIGNIFICANT CHANGE UP (ref 1.8–2.4)
MCHC RBC-ENTMCNC: 22.2 PG — LOW (ref 27–31)
MCHC RBC-ENTMCNC: 29.7 G/DL — LOW (ref 32–37)
MCV RBC AUTO: 74.6 FL — LOW (ref 80–94)
NRBC # BLD: 0 /100 WBCS — SIGNIFICANT CHANGE UP (ref 0–0)
PHOSPHATE SERPL-MCNC: 3 MG/DL — SIGNIFICANT CHANGE UP (ref 2.1–4.9)
PLATELET # BLD AUTO: 477 K/UL — HIGH (ref 130–400)
POTASSIUM SERPL-MCNC: 4.4 MMOL/L — SIGNIFICANT CHANGE UP (ref 3.5–5)
POTASSIUM SERPL-SCNC: 4.4 MMOL/L — SIGNIFICANT CHANGE UP (ref 3.5–5)
PROT SERPL-MCNC: 6.6 G/DL — SIGNIFICANT CHANGE UP (ref 6–8)
RBC # BLD: 4.1 M/UL — LOW (ref 4.7–6.1)
RBC # FLD: 25.7 % — HIGH (ref 11.5–14.5)
SODIUM SERPL-SCNC: 136 MMOL/L — SIGNIFICANT CHANGE UP (ref 135–146)
WBC # BLD: 9.46 K/UL — SIGNIFICANT CHANGE UP (ref 4.8–10.8)
WBC # FLD AUTO: 9.46 K/UL — SIGNIFICANT CHANGE UP (ref 4.8–10.8)

## 2021-05-30 PROCEDURE — 99233 SBSQ HOSP IP/OBS HIGH 50: CPT

## 2021-05-30 RX ADMIN — NAFCILLIN 200 GRAM(S): 10 INJECTION, POWDER, FOR SOLUTION INTRAVENOUS at 11:08

## 2021-05-30 RX ADMIN — ENOXAPARIN SODIUM 40 MILLIGRAM(S): 100 INJECTION SUBCUTANEOUS at 11:21

## 2021-05-30 RX ADMIN — MORPHINE SULFATE 2 MILLIGRAM(S): 50 CAPSULE, EXTENDED RELEASE ORAL at 18:53

## 2021-05-30 RX ADMIN — MORPHINE SULFATE 2 MILLIGRAM(S): 50 CAPSULE, EXTENDED RELEASE ORAL at 08:17

## 2021-05-30 RX ADMIN — Medication 81 MILLIGRAM(S): at 11:20

## 2021-05-30 RX ADMIN — NAFCILLIN 200 GRAM(S): 10 INJECTION, POWDER, FOR SOLUTION INTRAVENOUS at 15:12

## 2021-05-30 RX ADMIN — MORPHINE SULFATE 2 MILLIGRAM(S): 50 CAPSULE, EXTENDED RELEASE ORAL at 15:12

## 2021-05-30 RX ADMIN — INSULIN GLARGINE 20 UNIT(S): 100 INJECTION, SOLUTION SUBCUTANEOUS at 08:17

## 2021-05-30 RX ADMIN — Medication 1: at 16:52

## 2021-05-30 RX ADMIN — Medication 3 UNIT(S): at 11:18

## 2021-05-30 RX ADMIN — NAFCILLIN 200 GRAM(S): 10 INJECTION, POWDER, FOR SOLUTION INTRAVENOUS at 18:35

## 2021-05-30 RX ADMIN — GABAPENTIN 600 MILLIGRAM(S): 400 CAPSULE ORAL at 15:12

## 2021-05-30 RX ADMIN — NAFCILLIN 200 GRAM(S): 10 INJECTION, POWDER, FOR SOLUTION INTRAVENOUS at 02:56

## 2021-05-30 RX ADMIN — GABAPENTIN 600 MILLIGRAM(S): 400 CAPSULE ORAL at 21:04

## 2021-05-30 RX ADMIN — CYCLOBENZAPRINE HYDROCHLORIDE 5 MILLIGRAM(S): 10 TABLET, FILM COATED ORAL at 23:14

## 2021-05-30 RX ADMIN — NAFCILLIN 200 GRAM(S): 10 INJECTION, POWDER, FOR SOLUTION INTRAVENOUS at 21:03

## 2021-05-30 RX ADMIN — Medication 1: at 11:19

## 2021-05-30 RX ADMIN — MORPHINE SULFATE 2 MILLIGRAM(S): 50 CAPSULE, EXTENDED RELEASE ORAL at 23:16

## 2021-05-30 RX ADMIN — NAFCILLIN 200 GRAM(S): 10 INJECTION, POWDER, FOR SOLUTION INTRAVENOUS at 05:36

## 2021-05-30 RX ADMIN — Medication 3 UNIT(S): at 16:52

## 2021-05-30 RX ADMIN — Medication 2: at 08:12

## 2021-05-30 RX ADMIN — MORPHINE SULFATE 2 MILLIGRAM(S): 50 CAPSULE, EXTENDED RELEASE ORAL at 19:39

## 2021-05-30 RX ADMIN — PRASUGREL 10 MILLIGRAM(S): 5 TABLET, FILM COATED ORAL at 11:20

## 2021-05-30 RX ADMIN — GABAPENTIN 600 MILLIGRAM(S): 400 CAPSULE ORAL at 05:39

## 2021-05-30 RX ADMIN — DULOXETINE HYDROCHLORIDE 90 MILLIGRAM(S): 30 CAPSULE, DELAYED RELEASE ORAL at 11:20

## 2021-05-30 RX ADMIN — MORPHINE SULFATE 2 MILLIGRAM(S): 50 CAPSULE, EXTENDED RELEASE ORAL at 09:00

## 2021-05-30 RX ADMIN — MORPHINE SULFATE 2 MILLIGRAM(S): 50 CAPSULE, EXTENDED RELEASE ORAL at 16:00

## 2021-05-30 RX ADMIN — Medication 3 UNIT(S): at 08:12

## 2021-05-30 NOTE — PROGRESS NOTE ADULT - ASSESSMENT
A/P:    66 yo male admitted with left septic knee and severe PVD    --Left septic knee with synovitis: S/P left knee wash out POD #3 by ortho. Doing well. WBAT. No documented drain output in the past 24 hours. Will continue SCD and Lovenox as well as nafcillin. Awaiting for  intra operative cx result. Pain control and lab monitoring. PT/OT  --Leukocytosis: wbc of 10,000.  No fever. Most likely pain related. intra operative culture result has NGTD. Will obtain esr and crp while monitoring  --PVSD:  Patient refused duplex this morning since he said his pain is not controlled. Will add morphine 2 mg iv every 4 hours prn with parameters to the already implemented pain regimen (including IR and ED oxycodone, gabapentin, flexeril, Tylenol and Cymbalta). Cardio vascular on board and well appreciated.  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 EF of 15%. On Nafcillin for septic knee. ? stent placement  pending critical perfusion inhibition order wise, the risk of infection from the septic knee over weighs the risk of dissemination. Will defer to vascular   --MSSA Bacteremia: On Nafcillin . Will continue per ID Rec.   --Pseudomonas Aeruginosa infection: From the biliary drainage site. Likely colonization  --Hyperkalemia: Corrected  --DM2: Will increase insulin regimen to 20 units of Lantus qam, will add 3 units of lispro ac and continue corrective scale   --Anemia: Iron  def combined with ACD  --CAD: ASA, statin, Toprol and Prasugrel  --CHF with reduced EF: Aldactone and Lasix  --Covid vaccine status: He has received one dose and is supposed to receive the second dose on 5/15/21 but he was already hospitalized. ID on board and will appreciate their input on that.    --DVT ppx: Lovenox, prasugrel and ASA      #Progress Note Handoff: Pending ortho clearance and vascular doppler review . Midline placement for prolonged abx administration. Will reconsult PT    Pending (specify):  Consults________, Tests________, Test Results_______, Other_________  Family discussion:  Disposition: Home___/SNF_x__/Other________/Unknown at this time________ A/P:    66 yo male admitted with left septic knee and severe PVD    --Left septic knee with synovitis: S/P left knee wash out POD #3 by ortho. Doing well. WBAT. No documented drain output in the past 24 hours. Will continue SCD and Lovenox as well as nafcillin. Awaiting for  intra operative cx result. Pain control and lab monitoring. PT/OT  --Leukocytosis: wbc of  9.5. Corrected         --PVSD:  Patient refused duplex this morning since he said his pain is not controlled. Will add morphine 2 mg iv every 4 hours prn with parameters to the already implemented pain regimen (including IR and ED oxycodone, gabapentin, flexeril, Tylenol and Cymbalta). Cardio vascular on board and well appreciated.  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 EF of 15%. On Nafcillin for septic knee. ? stent placement  pending critical perfusion inhibition order wise, the risk of infection from the septic knee over weighs the risk of dissemination. Will defer to vascular   --MSSA Bacteremia: On Nafcillin . Will continue per ID Rec.   --Pseudomonas Aeruginosa infection: From the biliary drainage site. Likely colonization  --Hyperkalemia: Corrected  --DM2: Will increase insulin regimen to 20 units of Lantus qam, will add 3 units of lispro ac and continue corrective scale   --Anemia: Iron  def combined with ACD  --CAD: ASA, statin, Toprol and Prasugrel  --CHF with reduced EF: Aldactone and Lasix  --Covid vaccine status: He has received one dose and is supposed to receive the second dose on 5/15/21 but he was already hospitalized. ID on board and will appreciate their input on that.    --DVT ppx: Lovenox, prasugrel and ASA      #Progress Note Handoff: Pending ortho clearance and vascular doppler review . Midline placement for prolonged abx administration. Will reconsult PT    Pending (specify):  Consults________, Tests________, Test Results_______, Other_________  Family discussion:  Disposition: Home___/SNF_x__/Other________/Unknown at this time________ A/P:    66 yo male admitted with left septic knee and severe PVD    --Left septic knee with synovitis: S/P left knee wash out POD #3 by ortho. Doing well. WBAT. No documented drain output in the past 24 hours. Will continue SCD and Lovenox as well as nafcillin. Awaiting for  intra operative cx result. Pain control and lab monitoring. PT/OT  --Leukocytosis: wbc of  9.5. Corrected   --PVSD: Arterial doppler of bilateral LE show normal arterial flow in the bilateral lower extremities. Limited exam due to bandages. Vascular (Dr Margarito Morel) on board and will appreciate their rec. Note that patient is a vasculopath and  barely has any perfusion. EF of 15%. On Nafcillin for septic knee. ?occlusion with limited abx delivery to the knee and thus resultant delayed healing.  --Pain control: On multiple pain medications as ordered. Monitor  --MSSA Bacteremia: On Nafcillin . Will continue per ID Rec.   --Pseudomonas Aeruginosa infection: From the biliary drainage site. Likely colonization  --Hyperkalemia: Corrected  --DM2: Will increase insulin regimen to 20 units of Lantus qam, will add 3 units of lispro ac and continue corrective scale   --Anemia: Iron  def combined with ACD  --CAD: ASA, statin, Toprol and Prasugrel  --CHF with reduced EF: Aldactone and Lasix  --Covid vaccine status: He has received one dose and is supposed to receive the second dose on 5/15/21 but he was already hospitalized. ID on board and will appreciate their input on that.    --DVT ppx: Lovenox, prasugrel and ASA      #Progress Note Handoff: Pending ortho clearance and vascular doppler review . Midline placement for prolonged abx administration. Will reconsult PT    Pending (specify):  Consults________, Tests________, Test Results_______, Other_________  Family discussion:  Disposition: Home___/SNF_x__/Other________/Unknown at this time________ A/P:    66 yo male admitted with left septic knee and severe PVD    --Left septic knee with synovitis: S/P left knee wash out POD #3 by ortho. Doing well. WBAT. No documented drain output in the past 24 hours. Will continue SCD and Lovenox as well as nafcillin. Awaiting for  intra operative cx result. Pain control and lab monitoring. PT/OT  --Leukocytosis: wbc of  9.5. Corrected   --PVSD: Arterial doppler of bilateral LE show normal arterial flow in the bilateral lower extremities. Limited exam due to bandages. Vascular (Dr Margarito Morel) on board and will appreciate their rec. Note that patient is a vasculopath and  barely has any perfusion. EF of 15%. On Nafcillin for septic knee. ?occlusion with limited abx delivery to the knee and thus resultant delayed healing.  --Pain control: On multiple pain medications as ordered. Monitor  --MSSA Bacteremia: On Nafcillin . Will continue per ID Rec.   --Pseudomonas Aeruginosa infection: From the biliary drainage site. Likely colonization  --Hyperkalemia: Corrected  --DM2: Insulin regimen increased just 24 hrs ago and it take 4 half lives to see the effect. Continue current regimen   --Anemia: Iron  def combined with ACD  --CAD: ASA, statin, Toprol and Prasugrel  --CHF with reduced EF: Aldactone and Lasix  --Covid vaccine status: He has received one dose and is supposed to receive the second dose on 5/15/21 but he was already hospitalized. ID on board and will appreciate their input on that.    --DVT ppx: Lovenox, prasugrel and ASA      #Progress Note Handoff: Pending ortho clearance, ID rec with respect to abx length of admin and  vascular rec.     Pending (specify):  Consults________, Tests________, Test Results_______, Other_________  Family discussion:  Disposition: Home___/SNF_x__/Other________/Unknown at this time________

## 2021-05-30 NOTE — ED PROVIDER NOTE - IV ALTEPLASE ADMIN OUTSIDE HIDDEN
Need lab orders for 07/23 am - pt is coming for lab only apt and wants to check his Testosterone Total - please put lab order in.  Thx   show

## 2021-05-30 NOTE — PROGRESS NOTE ADULT - SUBJECTIVE AND OBJECTIVE BOX
Sub: Patient endorsed feeling better and much more controlled pain     OBJ:     Vital Signs Last 24 Hrs  T(C): 36.9 (30 May 2021 06:01), Max: 36.9 (30 May 2021 06:01)  T(F): 98.4 (30 May 2021 06:01), Max: 98.4 (30 May 2021 06:01)  HR: 95 (30 May 2021 06:01) (92 - 95)  BP: 123/75 (30 May 2021 06:01) (118/67 - 138/68)  BP(mean): --  RR: 18 (30 May 2021 06:01) (18 - 20)  SpO2: --      LABS:             05-30    136  |  98  |  11  ----------------------------<  157<H>  4.4   |  29  |  0.7    Ca    8.4<L>      30 May 2021 07:20  Phos  3.0     05-30  Mg     1.8     05-30    TPro  6.6  /  Alb  2.7<L>  /  TBili  0.6  /  DBili  x   /  AST  19  /  ALT  14  /  AlkPhos  78  05-30                            9.1    9.46  )-----------( 477      ( 30 May 2021 07:20 )             30.6         PHYSICAL EXAM:  Gen: NAD, resting in bed  HEENT: Normocephalic, atraumatic  Neck: supple, no lymphadenopathy  CV: Regular rate & regular rhythm  Lungs: decreased BS at bases, no fremitus  Abdomen: Soft, BS present  Ext: Warm, well wrapped with ACE post surgery. Knee brace in left knee  Neuro: non focal, awake  Skin: no rash, no erythema  Lines: no phlebitis

## 2021-05-31 LAB
ALBUMIN SERPL ELPH-MCNC: 2.5 G/DL — LOW (ref 3.5–5.2)
ALP SERPL-CCNC: 76 U/L — SIGNIFICANT CHANGE UP (ref 30–115)
ALT FLD-CCNC: 17 U/L — SIGNIFICANT CHANGE UP (ref 0–41)
ANION GAP SERPL CALC-SCNC: 9 MMOL/L — SIGNIFICANT CHANGE UP (ref 7–14)
AST SERPL-CCNC: 35 U/L — SIGNIFICANT CHANGE UP (ref 0–41)
BASOPHILS # BLD AUTO: 0.11 K/UL — SIGNIFICANT CHANGE UP (ref 0–0.2)
BASOPHILS NFR BLD AUTO: 1.2 % — HIGH (ref 0–1)
BILIRUB SERPL-MCNC: 0.7 MG/DL — SIGNIFICANT CHANGE UP (ref 0.2–1.2)
BUN SERPL-MCNC: 12 MG/DL — SIGNIFICANT CHANGE UP (ref 10–20)
CALCIUM SERPL-MCNC: 8.4 MG/DL — LOW (ref 8.5–10.1)
CHLORIDE SERPL-SCNC: 100 MMOL/L — SIGNIFICANT CHANGE UP (ref 98–110)
CO2 SERPL-SCNC: 27 MMOL/L — SIGNIFICANT CHANGE UP (ref 17–32)
CREAT SERPL-MCNC: 0.8 MG/DL — SIGNIFICANT CHANGE UP (ref 0.7–1.5)
EOSINOPHIL # BLD AUTO: 0.7 K/UL — SIGNIFICANT CHANGE UP (ref 0–0.7)
EOSINOPHIL NFR BLD AUTO: 7.9 % — SIGNIFICANT CHANGE UP (ref 0–8)
GLUCOSE BLDC GLUCOMTR-MCNC: 101 MG/DL — HIGH (ref 70–99)
GLUCOSE BLDC GLUCOMTR-MCNC: 166 MG/DL — HIGH (ref 70–99)
GLUCOSE BLDC GLUCOMTR-MCNC: 201 MG/DL — HIGH (ref 70–99)
GLUCOSE BLDC GLUCOMTR-MCNC: 239 MG/DL — HIGH (ref 70–99)
GLUCOSE BLDC GLUCOMTR-MCNC: 78 MG/DL — SIGNIFICANT CHANGE UP (ref 70–99)
GLUCOSE SERPL-MCNC: 79 MG/DL — SIGNIFICANT CHANGE UP (ref 70–99)
HCT VFR BLD CALC: 29.5 % — LOW (ref 42–52)
HGB BLD-MCNC: 8.8 G/DL — LOW (ref 14–18)
IMM GRANULOCYTES NFR BLD AUTO: 0.3 % — SIGNIFICANT CHANGE UP (ref 0.1–0.3)
LYMPHOCYTES # BLD AUTO: 1 K/UL — LOW (ref 1.2–3.4)
LYMPHOCYTES # BLD AUTO: 11.3 % — LOW (ref 20.5–51.1)
MCHC RBC-ENTMCNC: 22.3 PG — LOW (ref 27–31)
MCHC RBC-ENTMCNC: 29.8 G/DL — LOW (ref 32–37)
MCV RBC AUTO: 74.9 FL — LOW (ref 80–94)
MONOCYTES # BLD AUTO: 0.72 K/UL — HIGH (ref 0.1–0.6)
MONOCYTES NFR BLD AUTO: 8.2 % — SIGNIFICANT CHANGE UP (ref 1.7–9.3)
NEUTROPHILS # BLD AUTO: 6.26 K/UL — SIGNIFICANT CHANGE UP (ref 1.4–6.5)
NEUTROPHILS NFR BLD AUTO: 71.1 % — SIGNIFICANT CHANGE UP (ref 42.2–75.2)
NRBC # BLD: 0 /100 WBCS — SIGNIFICANT CHANGE UP (ref 0–0)
PLATELET # BLD AUTO: 477 K/UL — HIGH (ref 130–400)
POTASSIUM SERPL-MCNC: 4.5 MMOL/L — SIGNIFICANT CHANGE UP (ref 3.5–5)
POTASSIUM SERPL-SCNC: 4.5 MMOL/L — SIGNIFICANT CHANGE UP (ref 3.5–5)
PROT SERPL-MCNC: 6.5 G/DL — SIGNIFICANT CHANGE UP (ref 6–8)
RBC # BLD: 3.94 M/UL — LOW (ref 4.7–6.1)
RBC # FLD: 25.2 % — HIGH (ref 11.5–14.5)
SODIUM SERPL-SCNC: 136 MMOL/L — SIGNIFICANT CHANGE UP (ref 135–146)
WBC # BLD: 8.82 K/UL — SIGNIFICANT CHANGE UP (ref 4.8–10.8)
WBC # FLD AUTO: 8.82 K/UL — SIGNIFICANT CHANGE UP (ref 4.8–10.8)

## 2021-05-31 RX ORDER — ALPRAZOLAM 0.25 MG
0.5 TABLET ORAL EVERY 8 HOURS
Refills: 0 | Status: DISCONTINUED | OUTPATIENT
Start: 2021-05-31 | End: 2021-06-07

## 2021-05-31 RX ADMIN — NAFCILLIN 200 GRAM(S): 10 INJECTION, POWDER, FOR SOLUTION INTRAVENOUS at 02:55

## 2021-05-31 RX ADMIN — CYCLOBENZAPRINE HYDROCHLORIDE 5 MILLIGRAM(S): 10 TABLET, FILM COATED ORAL at 22:59

## 2021-05-31 RX ADMIN — MORPHINE SULFATE 2 MILLIGRAM(S): 50 CAPSULE, EXTENDED RELEASE ORAL at 18:25

## 2021-05-31 RX ADMIN — INSULIN GLARGINE 20 UNIT(S): 100 INJECTION, SOLUTION SUBCUTANEOUS at 11:18

## 2021-05-31 RX ADMIN — GABAPENTIN 600 MILLIGRAM(S): 400 CAPSULE ORAL at 05:38

## 2021-05-31 RX ADMIN — MORPHINE SULFATE 2 MILLIGRAM(S): 50 CAPSULE, EXTENDED RELEASE ORAL at 12:37

## 2021-05-31 RX ADMIN — Medication 81 MILLIGRAM(S): at 11:27

## 2021-05-31 RX ADMIN — GABAPENTIN 600 MILLIGRAM(S): 400 CAPSULE ORAL at 13:25

## 2021-05-31 RX ADMIN — MORPHINE SULFATE 2 MILLIGRAM(S): 50 CAPSULE, EXTENDED RELEASE ORAL at 11:30

## 2021-05-31 RX ADMIN — Medication 3 UNIT(S): at 17:25

## 2021-05-31 RX ADMIN — ENOXAPARIN SODIUM 40 MILLIGRAM(S): 100 INJECTION SUBCUTANEOUS at 11:28

## 2021-05-31 RX ADMIN — NAFCILLIN 200 GRAM(S): 10 INJECTION, POWDER, FOR SOLUTION INTRAVENOUS at 13:25

## 2021-05-31 RX ADMIN — NAFCILLIN 200 GRAM(S): 10 INJECTION, POWDER, FOR SOLUTION INTRAVENOUS at 05:38

## 2021-05-31 RX ADMIN — Medication 2: at 17:24

## 2021-05-31 RX ADMIN — NAFCILLIN 200 GRAM(S): 10 INJECTION, POWDER, FOR SOLUTION INTRAVENOUS at 17:53

## 2021-05-31 RX ADMIN — Medication 0.5 MILLIGRAM(S): at 20:46

## 2021-05-31 RX ADMIN — NAFCILLIN 200 GRAM(S): 10 INJECTION, POWDER, FOR SOLUTION INTRAVENOUS at 09:32

## 2021-05-31 RX ADMIN — NAFCILLIN 200 GRAM(S): 10 INJECTION, POWDER, FOR SOLUTION INTRAVENOUS at 21:36

## 2021-05-31 RX ADMIN — Medication 1: at 11:28

## 2021-05-31 RX ADMIN — DULOXETINE HYDROCHLORIDE 90 MILLIGRAM(S): 30 CAPSULE, DELAYED RELEASE ORAL at 11:17

## 2021-05-31 RX ADMIN — Medication 3 UNIT(S): at 11:28

## 2021-05-31 RX ADMIN — PRASUGREL 10 MILLIGRAM(S): 5 TABLET, FILM COATED ORAL at 11:17

## 2021-05-31 RX ADMIN — MORPHINE SULFATE 2 MILLIGRAM(S): 50 CAPSULE, EXTENDED RELEASE ORAL at 05:37

## 2021-05-31 RX ADMIN — GABAPENTIN 600 MILLIGRAM(S): 400 CAPSULE ORAL at 21:36

## 2021-05-31 NOTE — PROGRESS NOTE ADULT - SUBJECTIVE AND OBJECTIVE BOX
FLOWER MARISCAL  65y  Male      Patient is a 65y old  Male who presents with a chief complaint of Septic arthritis (31 May 2021 08:03)      INTERVAL HPI/OVERNIGHT EVENTS:       REVIEW OF SYSTEMS:  CONSTITUTIONAL: No fever, weight loss, or fatigue  RESPIRATORY: No cough, wheezing, chills or hemoptysis; No shortness of breath  CARDIOVASCULAR: No chest pain, palpitations, dizziness, or leg swelling  GASTROINTESTINAL: No abdominal or epigastric pain. No nausea, vomiting, or hematemesis; No diarrhea or constipation. No melena or hematochezia.  GENITOURINARY: No dysuria, frequency, hematuria, or incontinence  NEUROLOGICAL: No headaches, memory loss, loss of strength, numbness, or tremors  SKIN: No itching, burning, rashes, or lesions   MUSCULOSKELETAL: No joint pain or swelling; No muscle, back, or extremity pain  PSYCHIATRIC: No depression, anxiety, mood swings, or difficulty sleeping  All other review of systems negative    VITALS  T(C): 36.6 (05-31-21 @ 05:30), Max: 36.6 (05-31-21 @ 05:30)  HR: 85 (05-31-21 @ 05:30) (85 - 88)  BP: 111/59 (05-31-21 @ 05:30) (111/59 - 122/63)  RR: 18 (05-31-21 @ 05:30) (18 - 18)  SpO2: --  Wt(kg): --Vital Signs Last 24 Hrs  T(C): 36.6 (31 May 2021 05:30), Max: 36.6 (31 May 2021 05:30)  T(F): 97.9 (31 May 2021 05:30), Max: 97.9 (31 May 2021 05:30)  HR: 85 (31 May 2021 05:30) (85 - 88)  BP: 111/59 (31 May 2021 05:30) (111/59 - 122/63)  BP(mean): --  RR: 18 (31 May 2021 05:30) (18 - 18)  SpO2: --      05-30-21 @ 07:01  -  05-31-21 @ 07:00  --------------------------------------------------------  IN: 100 mL / OUT: 900 mL / NET: -800 mL        PHYSICAL EXAM:  GENERAL: NAD, well-groomed, well-developed  PSYCH: no agitation, baseline mentation  NERVOUS SYSTEM:  Alert & Oriented X3, Motor Strength 5/5 B/L upper and lower extremities; Sensory intact; FTN WNL  PULMONARY: Clear to percussion bilaterally; No rales, rhonchi, wheezing, or rubs  CARDIOVASCULAR: Regular rate and rhythm; No murmurs, rubs, or gallops  GI: Soft, Nontender, Nondistended; Bowel sounds present  EXTREMITIES:  2+ Peripheral Pulses, No clubbing, cyanosis, or edema  LYMPH: No lymphadenopathy noted  SKIN: No rashes or lesions    Consultant(s) Notes Reviewed:  [x ] YES  [ ] NO    Discussed with Consultants/Other Providers [ x] YES     LABS                          8.8    8.82  )-----------( 477      ( 31 May 2021 06:20 )             29.5     05-31    136  |  100  |  12  ----------------------------<  79  4.5   |  27  |  0.8    Ca    8.4<L>      31 May 2021 06:20  Phos  3.0     05-30  Mg     1.8     05-30    TPro  6.5  /  Alb  2.5<L>  /  TBili  0.7  /  DBili  x   /  AST  35  /  ALT  17  /  AlkPhos  76  05-31    POCT Blood Glucose.: 78 mg/dL (31 May 2021 07:15)      RADIOLOGY & ADDITIONAL TESTS:  MR Knee w/wo IV Cont, Left (05.25.21 @ 12:15)   IMPRESSION:  1. Septic arthritis of the knee with osteomyelitis in the distal femur and proximal tibia.  2. Associated large joint effusion with extensive synovitis. No additional collection identified.      HEALTH ISSUES - PROBLEM Dx:  Knee pain, left      MEDICATIONS  (STANDING):  aspirin  chewable 81 milliGRAM(s) Oral daily  dextrose 40% Gel 15 Gram(s) Oral once  dextrose 5%. 1000 milliLiter(s) (50 mL/Hr) IV Continuous <Continuous>  dextrose 5%. 1000 milliLiter(s) (100 mL/Hr) IV Continuous <Continuous>  dextrose 50% Injectable 25 Gram(s) IV Push once  dextrose 50% Injectable 12.5 Gram(s) IV Push once  dextrose 50% Injectable 25 Gram(s) IV Push once  DULoxetine 90 milliGRAM(s) Oral daily  enoxaparin Injectable 40 milliGRAM(s) SubCutaneous daily  gabapentin 600 milliGRAM(s) Oral three times a day  glucagon  Injectable 1 milliGRAM(s) IntraMuscular once  insulin glargine Injectable (LANTUS) 20 Unit(s) SubCutaneous every morning  insulin lispro (ADMELOG) corrective regimen sliding scale   SubCutaneous three times a day before meals  insulin lispro Injectable (ADMELOG) 3 Unit(s) SubCutaneous three times a day before meals  nafcillin  IVPB 2 Gram(s) IV Intermittent every 4 hours  prasugrel 10 milliGRAM(s) Oral daily    MEDICATIONS  (PRN):  acetaminophen   Tablet .. 650 milliGRAM(s) Oral every 8 hours PRN Temp greater or equal to 38C (100.4F), Moderate Pain (4 - 6)  cyclobenzaprine 5 milliGRAM(s) Oral two times a day PRN Muscle Spasm  morphine  - Injectable 2 milliGRAM(s) IV Push every 4 hours PRN Moderate Pain (4 - 6)  oxyCODONE    IR 5 milliGRAM(s) Oral every 4 hours PRN Severe Pain (7 - 10)     FLOWER MARISCAL  65y  Male      Patient is a 65y old  Male who presents with a chief complaint of Septic arthritis (31 May 2021 08:03)      INTERVAL HPI/OVERNIGHT EVENTS: no acute events overnight. no major complaints. no nursing concerns.      REVIEW OF SYSTEMS:  CONSTITUTIONAL: No fever, weight loss, or fatigue  RESPIRATORY: No cough, wheezing, chills or hemoptysis; No shortness of breath  CARDIOVASCULAR: No chest pain, palpitations, dizziness, or leg swelling  GASTROINTESTINAL: No abdominal or epigastric pain. No nausea, vomiting, or hematemesis; No diarrhea or constipation. No melena or hematochezia.  GENITOURINARY: No dysuria, frequency, hematuria, or incontinence  NEUROLOGICAL: No headaches, memory loss, loss of strength, numbness, or tremors  SKIN: No itching, burning, rashes, or lesions   MUSCULOSKELETAL: No joint pain or swelling; No muscle, back, or extremity pain  PSYCHIATRIC: No depression, anxiety, mood swings, or difficulty sleeping  All other review of systems negative    VITALS  T(C): 36.6 (05-31-21 @ 05:30), Max: 36.6 (05-31-21 @ 05:30)  HR: 85 (05-31-21 @ 05:30) (85 - 88)  BP: 111/59 (05-31-21 @ 05:30) (111/59 - 122/63)  RR: 18 (05-31-21 @ 05:30) (18 - 18)  SpO2: --  Wt(kg): --Vital Signs Last 24 Hrs  T(C): 36.6 (31 May 2021 05:30), Max: 36.6 (31 May 2021 05:30)  T(F): 97.9 (31 May 2021 05:30), Max: 97.9 (31 May 2021 05:30)  HR: 85 (31 May 2021 05:30) (85 - 88)  BP: 111/59 (31 May 2021 05:30) (111/59 - 122/63)  BP(mean): --  RR: 18 (31 May 2021 05:30) (18 - 18)  SpO2: --      05-30-21 @ 07:01  -  05-31-21 @ 07:00  --------------------------------------------------------  IN: 100 mL / OUT: 900 mL / NET: -800 mL        PHYSICAL EXAM:  GENERAL: NAD, well-groomed, well-developed  EYE: anicteric sclera, non injected conjunctiva, EOMI  ENT: hearing grossly intact, oropharynx clear  PSYCH: no agitation, baseline mentation  NERVOUS SYSTEM:  Alert & Oriented X3, Motor Strength 5/5 B/L upper and lower extremities; Sensory intact; FTN WNL  PULMONARY: Clear to percussion bilaterally; No rales, rhonchi, wheezing, or rubs  CARDIOVASCULAR: Regular rate and rhythm; No murmurs, rubs, or gallops  GI: Soft, Nontender, Nondistended; Bowel sounds present  EXTREMITIES:  2+ Peripheral Pulses, No clubbing, cyanosis, or edema  LYMPH: No lymphadenopathy noted  SKIN: No rashes or lesions    Consultant(s) Notes Reviewed:  [x ] YES  [ ] NO    Discussed with Consultants/Other Providers [ x] YES     LABS                          8.8    8.82  )-----------( 477      ( 31 May 2021 06:20 )             29.5     05-31    136  |  100  |  12  ----------------------------<  79  4.5   |  27  |  0.8    Ca    8.4<L>      31 May 2021 06:20  Phos  3.0     05-30  Mg     1.8     05-30    TPro  6.5  /  Alb  2.5<L>  /  TBili  0.7  /  DBili  x   /  AST  35  /  ALT  17  /  AlkPhos  76  05-31    POCT Blood Glucose.: 78 mg/dL (31 May 2021 07:15)      RADIOLOGY & ADDITIONAL TESTS:  MR Knee w/wo IV Cont, Left (05.25.21 @ 12:15)   IMPRESSION:  1. Septic arthritis of the knee with osteomyelitis in the distal femur and proximal tibia.  2. Associated large joint effusion with extensive synovitis. No additional collection identified.      HEALTH ISSUES - PROBLEM Dx:  Knee pain, left      MEDICATIONS  (STANDING):  aspirin  chewable 81 milliGRAM(s) Oral daily  dextrose 40% Gel 15 Gram(s) Oral once  dextrose 5%. 1000 milliLiter(s) (50 mL/Hr) IV Continuous <Continuous>  dextrose 5%. 1000 milliLiter(s) (100 mL/Hr) IV Continuous <Continuous>  dextrose 50% Injectable 25 Gram(s) IV Push once  dextrose 50% Injectable 12.5 Gram(s) IV Push once  dextrose 50% Injectable 25 Gram(s) IV Push once  DULoxetine 90 milliGRAM(s) Oral daily  enoxaparin Injectable 40 milliGRAM(s) SubCutaneous daily  gabapentin 600 milliGRAM(s) Oral three times a day  glucagon  Injectable 1 milliGRAM(s) IntraMuscular once  insulin glargine Injectable (LANTUS) 20 Unit(s) SubCutaneous every morning  insulin lispro (ADMELOG) corrective regimen sliding scale   SubCutaneous three times a day before meals  insulin lispro Injectable (ADMELOG) 3 Unit(s) SubCutaneous three times a day before meals  nafcillin  IVPB 2 Gram(s) IV Intermittent every 4 hours  prasugrel 10 milliGRAM(s) Oral daily    MEDICATIONS  (PRN):  acetaminophen   Tablet .. 650 milliGRAM(s) Oral every 8 hours PRN Temp greater or equal to 38C (100.4F), Moderate Pain (4 - 6)  cyclobenzaprine 5 milliGRAM(s) Oral two times a day PRN Muscle Spasm  morphine  - Injectable 2 milliGRAM(s) IV Push every 4 hours PRN Moderate Pain (4 - 6)  oxyCODONE    IR 5 milliGRAM(s) Oral every 4 hours PRN Severe Pain (7 - 10)

## 2021-05-31 NOTE — PROGRESS NOTE ADULT - ASSESSMENT
64 yo male admitted with left septic knee and severe PVD    # Left septic knee with synovitis:   - S/P left knee wash out POD #3 by ortho  - WBAT  -  No documented drain output in the past 24 hours.  -  Will continue SCD and Lovenox as well as nafcillin.   - Awaiting for  intra operative cx result.   - Pain control and lab monitoring  - PT/OT    # Leukocytosis:   - wbc of  9.5. Corrected     # PVSD:   - Arterial doppler of bilateral LE show normal arterial flow in the bilateral lower extremities. Limited exam due to bandages  -  Vascular (Dr Margarito Morel) on board and will appreciate their rec.  - Note that patient is a vasculopath and  barely has any perfusion.  - EF of 15%. On Nafcillin for septic knee. ?occlusion with limited abx delivery to the knee and thus resultant delayed healing.      # MSSA Bacteremia:  - On Nafcillin .   Will continue per ID Rec.     #   --Pseudomonas Aeruginosa infection: From the biliary drainage site. Likely colonization      # DM2:   Insulin regimen increased just 24 hrs ago and it take 4 half lives to see the effect. Continue current regimen      # Anemia:   - Iron  def combined with ACD    # CAD:   ASA, statin, Toprol and Prasugrel    # CHF with reduced EF: Aldactone and Lasix    # Covid vaccine status: He has received one dose and is supposed to receive the second dose on 5/15/21 but he was already hospitalized. ID on board and will appreciate their input on that.      # DVT ppx: Lovenox, prasugrel and ASA      #Progress Note Handoff: Pending ortho clearance, ID rec with respect to abx length of admin and  vascular rec.   Pending (specify):  Consults________, Tests________, Test Results_______, Other_________  Family discussion:  Disposition: Home___/SNF_x__/Other________/Unknown at this time________ 64 yo male admitted with left septic knee and severe PVD    # Left septic knee with synovitis:   - S/P left knee wash out POD #4 by ortho  - WBAT  -  No documented drain output in the past 24 hours.  -  Will continue SCD and Lovenox as well as nafcillin.   - Awaiting for  intra operative cx result.   - Pain control and lab monitoring  - PT/OT    # Leukocytosis:   - wbc of  9.5. Corrected     # PVSD:   - Arterial doppler of bilateral LE show normal arterial flow in the bilateral lower extremities. Limited exam due to bandages  -  Vascular (Dr Margarito Morel) on board and will appreciate their rec.  - Note that patient is a vasculopath and  barely has any perfusion.  - EF of 15%. On Nafcillin for septic knee. ?occlusion with limited abx delivery to the knee and thus resultant delayed healing.      # MSSA Bacteremia:  - On Nafcillin .   Will continue per ID Rec.     #   --Pseudomonas Aeruginosa infection: From the biliary drainage site. Likely colonization      # DM2:   Insulin regimen increased just 24 hrs ago and it take 4 half lives to see the effect. Continue current regimen      # Anemia:   - Iron  def combined with ACD    # CAD:   ASA, statin, Toprol and Prasugrel    # CHF with reduced EF: Aldactone and Lasix    # Covid vaccine status: He has received one dose and is supposed to receive the second dose on 5/15/21 but he was already hospitalized. ID on board and will appreciate their input on that.      # DVT ppx: Lovenox, prasugrel and ASA      #Progress Note Handoff: Pending ortho clearance, ID rec with respect to abx length of admin and  vascular rec.   Pending (specify):  Consults________, Tests________, Test Results_______, Other_________  Family discussion:  Disposition: Home___/SNF_x__/Other________/Unknown at this time________ 66 yo male admitted with left septic knee and severe PVD    # Left septic knee with synovitis:   - S/P left knee wash out POD #4 by ortho  - Ortho following; recs appreciated        Weightbearing: WBAT       Drains to remain x1 week        DVT ppx: SCD, lvx       Abx: cont naficillin as per ID       Pain control       PT/OT/Rehab  - follow up on ID recs  - PT recs pedning    # Leukocytosis: , RESOLVED    # PVSD:   - Arterial doppler of bilateral LE show normal arterial flow in the bilateral lower extremities. Limited exam due to bandages  -  Vascular (Dr Margarito Morel) following; recs appreciated        Bilateral LE arterial duplex (non-urgent, please consult with ortho first because the dressing and brace have to be removed for the study)        c/w ASA and Effient         Resume high intensity statin        c/w GDMT for HFrEF    # MSSA Bacteremia:  - ID following; recs appreciated        follow-up OR cultures        continue nafcillin 2g q 4 hours        agree with ortho recommendation for vascular consult         noted wound Cx 5/24 - labeled as joint fluid although taken from abdominal drainage -- this is likely a colonizer, would not target at this point         # DM2:   Insulin regimen increased just 24 hrs ago and it take 4 half lives to see the effect. Continue current regimen      # Anemia:   - Iron  def combined with ACD    # CAD:   ASA, statin, Toprol and Prasugrel    # CHF with reduced EF: Aldactone and Lasix    # Covid vaccine status: He has received one dose and is supposed to receive the second dose on 5/15/21 but he was already hospitalized. ID on board and will appreciate their input on that.      # DVT ppx: Lovenox, prasugrel and ASA      #Progress Note Handoff: Pending ortho clearance, ID rec with respect to abx length of admin and  vascular rec.   Pending (specify):  Consults________, Tests________, Test Results_______, Other_________  Family discussion:  Disposition: Home___/SNF_x__/Other________/Unknown at this time________

## 2021-05-31 NOTE — PROGRESS NOTE ADULT - SUBJECTIVE AND OBJECTIVE BOX
Orthopaedics Progress Note    FLOWER MARISCAL  [id number]    S&E at bedside this AM. Pain well controlled. Denies fever/chills/CP/SOB.  Arterial Duplex performed yesterday demonstrating normal arterial flow.    acetaminophen   Tablet .. 650 milliGRAM(s) Oral every 8 hours PRN  aspirin  chewable 81 milliGRAM(s) Oral daily  cyclobenzaprine 5 milliGRAM(s) Oral two times a day PRN  dextrose 40% Gel 15 Gram(s) Oral once  dextrose 5%. 1000 milliLiter(s) IV Continuous <Continuous>  dextrose 5%. 1000 milliLiter(s) IV Continuous <Continuous>  dextrose 50% Injectable 25 Gram(s) IV Push once  dextrose 50% Injectable 12.5 Gram(s) IV Push once  dextrose 50% Injectable 25 Gram(s) IV Push once  DULoxetine 90 milliGRAM(s) Oral daily  enoxaparin Injectable 40 milliGRAM(s) SubCutaneous daily  gabapentin 600 milliGRAM(s) Oral three times a day  glucagon  Injectable 1 milliGRAM(s) IntraMuscular once  insulin glargine Injectable (LANTUS) 20 Unit(s) SubCutaneous every morning  insulin lispro (ADMELOG) corrective regimen sliding scale   SubCutaneous three times a day before meals  insulin lispro Injectable (ADMELOG) 3 Unit(s) SubCutaneous three times a day before meals  morphine  - Injectable 2 milliGRAM(s) IV Push every 4 hours PRN  nafcillin  IVPB 2 Gram(s) IV Intermittent every 4 hours  oxyCODONE    IR 5 milliGRAM(s) Oral every 4 hours PRN  prasugrel 10 milliGRAM(s) Oral daily      T(C): 36.6 (05-31-21 @ 05:30), Max: 36.6 (05-31-21 @ 05:30)  HR: 85 (05-31-21 @ 05:30) (85 - 88)  BP: 111/59 (05-31-21 @ 05:30) (111/59 - 122/63)  RR: 18 (05-31-21 @ 05:30) (18 - 18)  SpO2: --    Physical Exam  NAD, resting comfortably  Breathing comfortably on RA    LLE  Drains in place, minimal bloody collections in both.  knee immobilizer in place  poor perfusion to bilateral lower extremities    Labs                        8.8    8.82  )-----------( 477      ( 31 May 2021 06:20 )             29.5     05-30    136  |  98  |  11  ----------------------------<  157<H>  4.4   |  29  |  0.7    Ca    8.4<L>      30 May 2021 07:20  Phos  3.0     05-30  Mg     1.8     05-30    TPro  6.6  /  Alb  2.7<L>  /  TBili  0.6  /  DBili  x   /  AST  19  /  ALT  14  /  AlkPhos  78  05-30    LIVER FUNCTIONS - ( 30 May 2021 07:20 )  Alb: 2.7 g/dL / Pro: 6.6 g/dL / ALK PHOS: 78 U/L / ALT: 14 U/L / AST: 19 U/L / GGT: x           A/P:  Weightbearing: WBAT  Drains to remain x1 week   DVT ppx: SCD, lvx  Abx: cont naficillin as per ID  Pain control  Home meds  Trend labs  PT/OT/Rehab

## 2021-05-31 NOTE — PROGRESS NOTE ADULT - ATTENDING COMMENTS
Pt. seen/ examined  Labs/ vitals reviewed  Drain output not documented  Addressed with nurse in charge: tabs for documentation created  Document drain output  Planning to D/C drains sequentially  Discussed plan/ doppler results/ prognosis with the patient

## 2021-06-01 LAB
ANION GAP SERPL CALC-SCNC: 8 MMOL/L — SIGNIFICANT CHANGE UP (ref 7–14)
BUN SERPL-MCNC: 15 MG/DL — SIGNIFICANT CHANGE UP (ref 10–20)
CALCIUM SERPL-MCNC: 7.9 MG/DL — LOW (ref 8.5–10.1)
CHLORIDE SERPL-SCNC: 100 MMOL/L — SIGNIFICANT CHANGE UP (ref 98–110)
CO2 SERPL-SCNC: 29 MMOL/L — SIGNIFICANT CHANGE UP (ref 17–32)
CREAT SERPL-MCNC: 0.8 MG/DL — SIGNIFICANT CHANGE UP (ref 0.7–1.5)
GLUCOSE BLDC GLUCOMTR-MCNC: 137 MG/DL — HIGH (ref 70–99)
GLUCOSE BLDC GLUCOMTR-MCNC: 176 MG/DL — HIGH (ref 70–99)
GLUCOSE BLDC GLUCOMTR-MCNC: 235 MG/DL — HIGH (ref 70–99)
GLUCOSE BLDC GLUCOMTR-MCNC: 331 MG/DL — HIGH (ref 70–99)
GLUCOSE SERPL-MCNC: 158 MG/DL — HIGH (ref 70–99)
HCT VFR BLD CALC: 28.2 % — LOW (ref 42–52)
HGB BLD-MCNC: 8.5 G/DL — LOW (ref 14–18)
MAGNESIUM SERPL-MCNC: 1.8 MG/DL — SIGNIFICANT CHANGE UP (ref 1.8–2.4)
MCHC RBC-ENTMCNC: 22.5 PG — LOW (ref 27–31)
MCHC RBC-ENTMCNC: 30.1 G/DL — LOW (ref 32–37)
MCV RBC AUTO: 74.8 FL — LOW (ref 80–94)
NRBC # BLD: 0 /100 WBCS — SIGNIFICANT CHANGE UP (ref 0–0)
PHOSPHATE SERPL-MCNC: 2.9 MG/DL — SIGNIFICANT CHANGE UP (ref 2.1–4.9)
PLATELET # BLD AUTO: 429 K/UL — HIGH (ref 130–400)
POTASSIUM SERPL-MCNC: 3.9 MMOL/L — SIGNIFICANT CHANGE UP (ref 3.5–5)
POTASSIUM SERPL-SCNC: 3.9 MMOL/L — SIGNIFICANT CHANGE UP (ref 3.5–5)
RBC # BLD: 3.77 M/UL — LOW (ref 4.7–6.1)
RBC # FLD: 25.4 % — HIGH (ref 11.5–14.5)
SODIUM SERPL-SCNC: 137 MMOL/L — SIGNIFICANT CHANGE UP (ref 135–146)
WBC # BLD: 8.87 K/UL — SIGNIFICANT CHANGE UP (ref 4.8–10.8)
WBC # FLD AUTO: 8.87 K/UL — SIGNIFICANT CHANGE UP (ref 4.8–10.8)

## 2021-06-01 PROCEDURE — 99231 SBSQ HOSP IP/OBS SF/LOW 25: CPT

## 2021-06-01 RX ORDER — INSULIN LISPRO 100/ML
VIAL (ML) SUBCUTANEOUS
Refills: 0 | Status: DISCONTINUED | OUTPATIENT
Start: 2021-06-01 | End: 2021-06-15

## 2021-06-01 RX ORDER — BACLOFEN 100 %
5 POWDER (GRAM) MISCELLANEOUS ONCE
Refills: 0 | Status: COMPLETED | OUTPATIENT
Start: 2021-06-01 | End: 2021-06-01

## 2021-06-01 RX ADMIN — MORPHINE SULFATE 2 MILLIGRAM(S): 50 CAPSULE, EXTENDED RELEASE ORAL at 23:57

## 2021-06-01 RX ADMIN — DULOXETINE HYDROCHLORIDE 90 MILLIGRAM(S): 30 CAPSULE, DELAYED RELEASE ORAL at 10:52

## 2021-06-01 RX ADMIN — Medication 3 UNIT(S): at 08:18

## 2021-06-01 RX ADMIN — Medication 2: at 08:17

## 2021-06-01 RX ADMIN — CYCLOBENZAPRINE HYDROCHLORIDE 5 MILLIGRAM(S): 10 TABLET, FILM COATED ORAL at 05:24

## 2021-06-01 RX ADMIN — GABAPENTIN 600 MILLIGRAM(S): 400 CAPSULE ORAL at 14:44

## 2021-06-01 RX ADMIN — Medication 2: at 11:23

## 2021-06-01 RX ADMIN — Medication 81 MILLIGRAM(S): at 10:51

## 2021-06-01 RX ADMIN — GABAPENTIN 600 MILLIGRAM(S): 400 CAPSULE ORAL at 05:24

## 2021-06-01 RX ADMIN — MORPHINE SULFATE 2 MILLIGRAM(S): 50 CAPSULE, EXTENDED RELEASE ORAL at 09:00

## 2021-06-01 RX ADMIN — NAFCILLIN 200 GRAM(S): 10 INJECTION, POWDER, FOR SOLUTION INTRAVENOUS at 01:41

## 2021-06-01 RX ADMIN — INSULIN GLARGINE 20 UNIT(S): 100 INJECTION, SOLUTION SUBCUTANEOUS at 08:15

## 2021-06-01 RX ADMIN — NAFCILLIN 200 GRAM(S): 10 INJECTION, POWDER, FOR SOLUTION INTRAVENOUS at 10:25

## 2021-06-01 RX ADMIN — NAFCILLIN 200 GRAM(S): 10 INJECTION, POWDER, FOR SOLUTION INTRAVENOUS at 17:19

## 2021-06-01 RX ADMIN — NAFCILLIN 200 GRAM(S): 10 INJECTION, POWDER, FOR SOLUTION INTRAVENOUS at 21:16

## 2021-06-01 RX ADMIN — Medication 5 MILLIGRAM(S): at 21:35

## 2021-06-01 RX ADMIN — Medication 3 UNIT(S): at 16:50

## 2021-06-01 RX ADMIN — GABAPENTIN 600 MILLIGRAM(S): 400 CAPSULE ORAL at 21:36

## 2021-06-01 RX ADMIN — MORPHINE SULFATE 2 MILLIGRAM(S): 50 CAPSULE, EXTENDED RELEASE ORAL at 08:14

## 2021-06-01 RX ADMIN — Medication 0.5 MILLIGRAM(S): at 10:19

## 2021-06-01 RX ADMIN — NAFCILLIN 200 GRAM(S): 10 INJECTION, POWDER, FOR SOLUTION INTRAVENOUS at 14:43

## 2021-06-01 RX ADMIN — NAFCILLIN 200 GRAM(S): 10 INJECTION, POWDER, FOR SOLUTION INTRAVENOUS at 05:25

## 2021-06-01 RX ADMIN — ENOXAPARIN SODIUM 40 MILLIGRAM(S): 100 INJECTION SUBCUTANEOUS at 10:52

## 2021-06-01 RX ADMIN — Medication 3 UNIT(S): at 11:24

## 2021-06-01 RX ADMIN — MORPHINE SULFATE 2 MILLIGRAM(S): 50 CAPSULE, EXTENDED RELEASE ORAL at 05:20

## 2021-06-01 RX ADMIN — MORPHINE SULFATE 2 MILLIGRAM(S): 50 CAPSULE, EXTENDED RELEASE ORAL at 04:43

## 2021-06-01 RX ADMIN — MORPHINE SULFATE 2 MILLIGRAM(S): 50 CAPSULE, EXTENDED RELEASE ORAL at 14:44

## 2021-06-01 NOTE — PROGRESS NOTE ADULT - SUBJECTIVE AND OBJECTIVE BOX
Ortho Update Note    Left knee lateral drain removed this morning  Plan to keep medial drain for 1 day and monitor output  If output stable then we will remove medial drain tomorrow  Case discussed with Dr. Vaughn

## 2021-06-01 NOTE — CHART NOTE - NSCHARTNOTEFT_GEN_A_CORE
Per ID patient will need a total of 6 weeks antibiotics.  IR consulted for PICC placement.  Ortho discontinued one drain from L knee, one drain remains in place.  Burn consulted to evaluate graft to R knee.  PT/OT consulted for safe discharge recommendations.

## 2021-06-01 NOTE — PROGRESS NOTE ADULT - ASSESSMENT
ASSESSMENT  65 year old male with PMH of CAD s/p MI, PCI/CABG, CHFrEF (15-20%), has ICD, HTN, celiac disease, DM, neuropathy, chronic b/l LE ulcers presents, severe chronic pain,  hx infected R TKR with MRSA (was eventually cemented so has limited ROM R knee), and history of cholecystostomy tube placement in February 2020 for acute cholecystitis s/p removal in May 2020 with multiple presentations including persistent bilious drainage from the prior tract site as well as a RUQ abdominal abscess at the site s/p drainage who presents with left knee pain    IMPRESSION  #Septic Arthritis of left knee with MSSA bacteremia  - s/p arthrocentesis of left knee - consistent with baterial septic infection   - s/p OR washout 4/26 -- purulent fluid in left knee with significant tricompartmental arthritis   - Blood Cx 4/26 MSSA  - OR Cx 4/26 MSSA  - Blood Cx 4/27 NG, 4/28 growing Staph   - Blood Cx 4/30-5/4 NG  - JOHN PAUL 5/5: no evidence of valvular or lead vegation; noted mild, non-mobile atheroma seen in the thoracic aorta.   - MR Knee w/wo IV Cont, Left (05.06.21 @ 19:05): Septic arthritis of the knee with osteomyelitis in the distal femur and proximal tibia. Associated large joint effusion with extensive synovitis. Noadditional collection identified.  - s/p knee tap 5/9 -- 15 cc of blood fluid; WBC 14331 (95% PMN)  - s/p knee tap 5/11 WBC 77085 (97% PMN)  - s/p Arthroscopic drainage of knee 5/27 -- no purulence but noted large organized hematoma -- antibiotic beads placed - Wound Cx 5/27 NG      #Biliary Drainage from previous perc sudhir site  - Previous Intraabdominal 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 Decreased area of right upper quadrant subcutaneous stranding at site of prior percutaneous cholecystostomy, without evidence of abscess.  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    #CHF s/p ICD  #DM   #Abx allergy: carvedilol (Other)  enalapril (Hives)  Entresto (Other)    Creatinine, Serum: 1.0 mg/dL (04.25.21 @ 22:15)  Weight (kg): 81 (26 Apr 2021 17:23)    RECOMMENDATIONS  - continue nafcillin 2g q 4 hours  -  will need 6 weeks from time of last debridement (5/27-7/7) -- to finish with cefazolin 2g q 8 hours  - drain management per Ortho     Please call or message on [employer] Teams if with any questions.  Spectra [phone]

## 2021-06-01 NOTE — CONSULT NOTE ADULT - ASSESSMENT
Right knee wounds s/p fall.     RECOMMENDATION:  Wound care - Continue local wound care twice a day. Wash with soap and water. Apply hydrogel cover with Allevyn pad.   No surgical debridement needed.   Can follow up Burn outpatient on discharge, [phone] to make appointment at [address] [place] [address].

## 2021-06-01 NOTE — PROGRESS NOTE ADULT - ASSESSMENT
64 yo male admitted with left septic knee and severe PVD    # Left septic knee with synovitis:   - S/P left knee wash out POD #5 by ortho  - Ortho following; recs appreciated          Left knee lateral drain removed this morning          Plan to keep medial drain for 1 day and monitor output          If output stable then we will remove medial drain tomorrow  - follow up on ID recs  - PT following; recs pending    # Leukocytosis: , RESOLVED    # PVSD:   - Arterial doppler of bilateral LE show normal arterial flow in the bilateral lower extremities. Limited exam due to bandages  -  Vascular (Dr Margarito Morel) following; recs appreciated        c/w ASA and Effient         Resume high intensity statin        c/w GDMT for HFrEF    # MSSA Bacteremia:  - ID following; recs appreciated        follow-up OR cultures        continue nafcillin 2g q 4 hours    # DM2:   Insulin regimen increased just 24 hrs ago and it take 4 half lives to see the effect. Continue current regimen      # Anemia:   - Iron  def combined with ACD    # CAD:   ASA, statin, Toprol and Prasugrel    # CHF with reduced EF:   Aldactone and Lasix    # Covid vaccine status:   He has received one dose and is supposed to receive the second dose on 5/15/21 but he was already hospitalized. ID on board and will appreciate their input on that.      # DVT ppx: Lovenox, prasugrel and ASA      #Progress Note Handoff:   Pending (specify):  Pending ortho clearance, ID rec with respect to abx length of admin   Family discussion:  Disposition: Home___/SNF_x__/Other________/Unknown at this time________   64 yo male admitted with left septic knee and severe PVD    # Left septic knee with synovitis:   - S/P left knee wash out POD #5 by ortho  - Ortho following; recs appreciated          Left knee lateral drain removed this morning(6/1)          Plan to keep medial drain for 1 day and monitor output          If output stable then we will remove medial drain tomorrow(6/2)  - follow up on ID recs  - PT following; recs pending    # Leukocytosis: , RESOLVED    # PVSD:   - Arterial doppler of bilateral LE show normal arterial flow in the bilateral lower extremities. Limited exam due to bandages  -  Vascular (Dr Margarito Morel) following; recs appreciated        c/w ASA and Effient         Resume high intensity statin        c/w GDMT for HFrEF    # MSSA Bacteremia:  - ID following; recs appreciated        follow-up OR cultures        continue nafcillin 2g q 4 hours    # DM2:   Insulin regimen increased just 24 hrs ago and it take 4 half lives to see the effect. Continue current regimen      # Anemia:   - Iron  def combined with ACD    # CAD:   ASA, statin, Toprol and Prasugrel    # CHF with reduced EF:   Aldactone and Lasix    # Covid vaccine status:   He has received one dose and is supposed to receive the second dose on 5/15/21 but he was already hospitalized. ID on board and will appreciate their input on that.      # DVT ppx: Lovenox, prasugrel and ASA      #Progress Note Handoff:   Pending (specify):  Pending ortho clearance, ID rec with respect to abx length of admin   Family discussion:  Disposition: Home___/SNF_x__/Other________/Unknown at this time________   64 yo male admitted with left septic knee and severe PVD    # Left septic knee with synovitis:   - S/P left knee wash out POD #5 by ortho  - Ortho following; recs appreciated          Left knee lateral drain removed this morning(6/1)          Plan to keep medial drain for 1 day and monitor output          If output stable then we will remove medial drain tomorrow(6/2)  - follow up on ID recs  - PT following; recs pending    # Leukocytosis: , RESOLVED    # PVSD:   - Arterial doppler of bilateral LE show normal arterial flow in the bilateral lower extremities. Limited exam due to bandages  -  Vascular (Dr Margarito Morel) following; recs appreciated        c/w ASA and Effient         Resume high intensity statin        c/w GDMT for HFrEF    # MSSA Bacteremia:  - ID following; recs appreciated        follow-up OR cultures        continue nafcillin 2g q 4 hours    # DM2:   Insulin regimen increased just 24 hrs ago and it take 4 half lives to see the effect. Continue current regimen      # Anemia:   - Iron  def combined with ACD    # CAD:   ASA, statin, Toprol and Prasugrel    # CHF with reduced EF:   Aldactone and Lasix    # Covid vaccine status:   He has received one dose and is supposed to receive the second dose on 5/15/21 but he was already hospitalized. ID on board and will appreciate their input on that.      # DVT ppx: Lovenox, prasugrel and ASA      #Progress Note Handoff:   Pending (specify):  PICC line, Ortho monitoring of drain output  Family discussion: patient updated on plna  Disposition: Home___/SNF_x__in next 24-48hrs

## 2021-06-01 NOTE — CONSULT NOTE ADULT - SUBJECTIVE AND OBJECTIVE BOX
65y  Male  HPI:  65 year old male with PMH of CAD s/p MI, PCI/CABG, CHFrEF (15-20%), has ICD, HTN, celiac disease, DM, neuropathy, chronic b/l LE ulcers presents, severe chronic pain,  hx infected R TKR with MRSA (was eventually cemented so has limited ROM R knee), and history of cholecystostomy tube placement in February 2020 for acute cholecystitis s/p removal in May 2020 with multiple presentations including persistent bilious drainage from the prior tract site as well as a RUQ abdominal abscess at the site s/p drainage. He presented to ED with inability to walk/ambulate 2/2 left knee pain. Pt has hx of knee pain and infections. 2 weeks ago pt received a 'steroid' shot for his left knee for pain. 2 days later, his knee began to swell and he was unable to walk or bend the knee. It progressively gotten worse to the point where the pain was unbearable so he presented to the ED.     Pt endorses a weight loss >70 lbs over the last year, there is bilious drainage coming from where he had a prior per sudhir, jaundice in the finger tips and sclera. Recent admission for uncontrollable movements, no diagnosis given. Pt denies f/c/n/v, chest pain, headaches, UTI symptoms     In the ED:  Joint was aspirated cloudy yellow fluid was seen and sent for cultures. Neutrophil count >50,000 and RBC >8000. Ortho consulted for septic arthritis, s/p arthroscopic drainage. Surg consulted for bile drainage, HIDA scan, consult GI, possible ERCP. CT A&P, No evidence of acute intra-abdominal pathology. Decreased area of right upper quadrant subcutaneous stranding at site of prior percutaneous cholecystostomy, without evidence of abscess. Vitally stable. Admitted to medicine for medical management.    The patient is being followed by Neurology, and he is followed by Cardiology Dr. Lopez in  and Dr. Lilia Caldera in [facility], also by Endo Dr. Cormier at [facility].     (26 Apr 2021 11:34)    Burn consulted to evaluate right knee wound. Patient is known to burn from a previous admission in February 2020 where he had a wound on his right knee after a fall . Patient underwent debridement and skin grafting at that time without issue. Patient states about 6-7 months ago he was at another facility where he fell onto his knee causing a wound on his old skin graft. The wound was healing for the most part but patient noticed it has become worse again during this admission which is what prompted the consult. Patient currently admitted for septic arthritis of the left knee for which orthopedics is taking care of.       Allergies    ACE inhibitors (Hives)  carvedilol (Other)  enalapril (Hives)  Entresto (Other)    Intolerances      PAST MEDICAL & SURGICAL HISTORY:  Status post percutaneous transluminal coronary angioplasty  2 stents    Atherosclerosis of coronary artery  CAD (coronary artery disease)    Osteoarthritis    HLD (hyperlipidemia)    Blood clot due to device, implant, or graft  was on blood thinners    Diabetes  on insulin pump    HTN (hypertension)    CHF (congestive heart failure)  EF ~ 25%    History of celiac disease    Diverticulitis    STEMI (ST elevation myocardial infarction)    Diabetic neuropathy    Anxiety and depression    Other postprocedural status  Fixation hardware in foot LEFT    Stented coronary artery  10/18 heart attack  INFERIOR WALL MI    S/P CABG x 1  2018    S/P TKR (total knee replacement), right  with infection Mrsa   per pt he was cleared from MRSA infection    Surgery, elective  right knee wound debridement    History of open reduction and internal fixation (ORIF) procedure    H/O shoulder surgery  right    AICD (automatic cardioverter/defibrillator) present    Cholecystostomy care  drain inserted 2020 &amp; removed 4 months ago    History of tonsillectomy    History of hip replacement, total, right      Exam:  General: NAD  Neuro: AAO x3  Resp: Breathing comfortably on room air, equal chest rise and fall bilaterally  Wound:   Right knee: there are 2 wounds - Wound #1 is at the superior aspect of the knee ~4x2cm full thickness, pink healthy appearing tissue, no erythema, no bleeding, no purulence. Wound #2 is small located at the lateral aspect of the right knee ~1.5 x 0.5cm, also pink, healthy appearing tissue, no erythema, no bleeding , no purulence. Skin graft to right knee is healed nicely otherwise.

## 2021-06-01 NOTE — PROGRESS NOTE ADULT - SUBJECTIVE AND OBJECTIVE BOX
LOIDAFLOWER KLINE  65y, Male  Allergy: ACE inhibitors (Hives)  carvedilol (Other)  enalapril (Hives)  Entresto (Other)      LOS  36d    CHIEF COMPLAINT: Septic arthritis (01 Jun 2021 12:18)      INTERVAL EVENTS/HPI  - No acute events overnight  - T(F): , Max: 98.6 (05-31-21 @ 21:49)  - continue left knee pain -- stable   - WBC Count: 8.87 (06-01-21 @ 11:14)  WBC Count: 8.82 (05-31-21 @ 06:20)     - Creatinine, Serum: 0.8 (06-01-21 @ 11:14)  Creatinine, Serum: 0.8 (05-31-21 @ 06:20)       ROS  General: Denies rigors, nightsweats  HEENT: Denies headache, rhinorrhea, sore throat, eye pain  CV: Denies CP, palpitations  PULM: Denies wheezing, hemoptysis  GI: Denies hematemesis, hematochezia, melena  : Denies discharge, hematuria  MSK: Denies arthralgias, myalgias  SKIN: Denies rash, lesions  NEURO: Denies paresthesias, weakness  PSYCH: Denies depression, anxiety    VITALS:  T(F): 97.8, Max: 98.6 (05-31-21 @ 21:49)  HR: 84  BP: 109/61  RR: 18Vital Signs Last 24 Hrs  T(C): 36.6 (01 Jun 2021 13:10), Max: 37 (31 May 2021 21:49)  T(F): 97.8 (01 Jun 2021 13:10), Max: 98.6 (31 May 2021 21:49)  HR: 84 (01 Jun 2021 13:10) (66 - 88)  BP: 109/61 (01 Jun 2021 13:10) (109/61 - 134/74)  BP(mean): 134 (01 Jun 2021 06:06) (134 - 134)  RR: 18 (01 Jun 2021 13:10) (18 - 20)  SpO2: --    PHYSICAL EXAM:  Gen: NAD, resting in bed  HEENT: Normocephalic, atraumatic  Neck: supple, no lymphadenopathy  CV: Regular rate & regular rhythm  Lungs: decreased BS at bases, no fremitus  Abdomen: Soft, BS present  Ext: left knee swollen, not warm, drains in place   Neuro: non focal, awake  Skin: no rash, no erythema  Lines: no phlebitis    FH: Non-contributory  Social Hx: Non-contributory    TESTS & MEASUREMENTS:                        8.5    8.87  )-----------( 429      ( 01 Jun 2021 11:14 )             28.2     06-01    137  |  100  |  15  ----------------------------<  158<H>  3.9   |  29  |  0.8    Ca    7.9<L>      01 Jun 2021 11:14  Phos  2.9     06-01  Mg     1.8     06-01    TPro  6.5  /  Alb  2.5<L>  /  TBili  0.7  /  DBili  x   /  AST  35  /  ALT  17  /  AlkPhos  76  05-31    eGFR if Non African American: 95 mL/min/1.73M2 (06-01-21 @ 11:14)  eGFR if African American: 110 mL/min/1.73M2 (06-01-21 @ 11:14)    LIVER FUNCTIONS - ( 31 May 2021 06:20 )  Alb: 2.5 g/dL / Pro: 6.5 g/dL / ALK PHOS: 76 U/L / ALT: 17 U/L / AST: 35 U/L / GGT: x               Culture - Surgical Swab (collected 05-27-21 @ 09:05)  Source: .Surgical Swab None  Preliminary Report (05-28-21 @ 19:36):    No growth    Culture - Surgical Swab (collected 05-27-21 @ 09:05)  Source: .Surgical Swab None  Preliminary Report (05-28-21 @ 19:32):    No growth    Culture - Aspirate with Gram Stain (collected 05-24-21 @ 14:00)  Source: Joint Fl None  Final Report (05-29-21 @ 17:46):    Few Pseudomonas aeruginosa  Organism: Pseudomonas aeruginosa (05-29-21 @ 17:46)  Organism: Pseudomonas aeruginosa (05-29-21 @ 17:46)      -  Amikacin: S <=16      -  Aztreonam: S <=4      -  Cefepime: S 8      -  Ceftazidime: S 4      -  Ciprofloxacin: S <=0.25      -  Gentamicin: S 4      -  Imipenem: S 2      -  Levofloxacin: S <=0.5      -  Meropenem: S 2      -  Piperacillin/Tazobactam: S <=8      -  Tobramycin: S <=2      Method Type: YOLIE    Culture - Body Fluid with Gram Stain (collected 05-20-21 @ 16:57)  Source: .Body Fluid Synovial Fluid  Gram Stain (05-21-21 @ 07:48):    No polymorphonuclear leukocytes seen    No organisms seen    by cytocentrifuge  Final Report (05-25-21 @ 17:37):    No growth at 5 days Culture in progress    Culture - Acid Fast - Body Fluid w/Smear (collected 05-11-21 @ 11:54)  Source: .Body Fluid Knee Fluid  Preliminary Report (05-19-21 @ 15:03):    No growth at 1 week.    Culture - Body Fluid with Gram Stain (collected 05-09-21 @ 14:10)  Source: .Body Fluid Synovial Fluid  Gram Stain (05-09-21 @ 23:07):    Numerous polymorphonuclear leukocytes per low power field    No organisms seen per oil power field  Final Report (05-23-21 @ 16:42):    No growth at 14 days.    Culture - Blood (collected 05-04-21 @ 05:33)  Source: .Blood None  Final Report (05-09-21 @ 18:00):    No Growth Final    Culture - Blood (collected 05-03-21 @ 07:47)  Source: .Blood None  Final Report (05-08-21 @ 18:00):    No Growth Final            INFECTIOUS DISEASES TESTING  COVID-19 PCR: NotDetec (05-25-21 @ 09:04)  COVID-19 PCR: NotDetec (05-02-21 @ 08:52)  COVID-19 PCR: Detected (04-29-21 @ 14:33)  COVID-19 PCR: NotDetec (04-26-21 @ 06:00)  COVID-19 PCR: NotDetec (03-12-21 @ 11:00)  COVID-19 PCR: Detected (02-05-21 @ 13:47)  COVID-19 PCR: NotDetec (10-13-20 @ 09:08)  COVID-19 PCR: NotDetec (10-03-20 @ 19:54)  COVID-19 PCR: NotDetec (09-01-20 @ 20:40)  COVID-19 PCR: NotDetec (07-30-20 @ 07:43)  COVID-19 PCR: NotDetec (07-16-20 @ 19:46)      INFLAMMATORY MARKERS  Sedimentation Rate, Erythrocyte: 127 mm/Hr (05-18-21 @ 06:36)  C-Reactive Protein, Serum: 59 mg/L (05-18-21 @ 06:36)  Sedimentation Rate, Erythrocyte: 106 mm/Hr (05-15-21 @ 08:56)  C-Reactive Protein, Serum: 81 mg/L (05-15-21 @ 08:56)      RADIOLOGY & ADDITIONAL TESTS:  I have personally reviewed the last available Chest xray  CXR      CT      CARDIOLOGY TESTING      MEDICATIONS  aspirin  chewable 81 Oral daily  dextrose 40% Gel 15 Oral once  dextrose 5%. 1000 IV Continuous <Continuous>  dextrose 5%. 1000 IV Continuous <Continuous>  dextrose 50% Injectable 25 IV Push once  dextrose 50% Injectable 12.5 IV Push once  dextrose 50% Injectable 25 IV Push once  DULoxetine 90 Oral daily  enoxaparin Injectable 40 SubCutaneous daily  gabapentin 600 Oral three times a day  glucagon  Injectable 1 IntraMuscular once  insulin glargine Injectable (LANTUS) 20 SubCutaneous every morning  insulin lispro (ADMELOG) corrective regimen sliding scale  SubCutaneous three times a day before meals  insulin lispro Injectable (ADMELOG) 3 SubCutaneous three times a day before meals  nafcillin  IVPB 2 IV Intermittent every 4 hours  prasugrel 10 Oral daily      WEIGHT  Weight (kg): 81 (05-26-21 @ 11:17)  Creatinine, Serum: 0.8 mg/dL (06-01-21 @ 11:14)      ANTIBIOTICS:  nafcillin  IVPB 2 Gram(s) IV Intermittent every 4 hours      All available historical records have been reviewed

## 2021-06-01 NOTE — PROGRESS NOTE ADULT - SUBJECTIVE AND OBJECTIVE BOX
FLOWER MARISCAL  65y  Male      Patient is a 65y old  Male who presents with a chief complaint of Septic arthritis (01 Jun 2021 11:22)      INTERVAL HPI/OVERNIGHT EVENTS: no acute events overnight. patient doing well. no complaints or issues at this time.      REVIEW OF SYSTEMS:  CONSTITUTIONAL: No fever, weight loss, or fatigue  RESPIRATORY: No cough, wheezing, chills or hemoptysis; No shortness of breath  CARDIOVASCULAR: No chest pain, palpitations, dizziness, or leg swelling  GASTROINTESTINAL: No abdominal or epigastric pain. No nausea, vomiting, or hematemesis; No diarrhea or constipation. No melena or hematochezia.  GENITOURINARY: No dysuria, frequency, hematuria, or incontinence  NEUROLOGICAL: No headaches, memory loss, loss of strength, numbness, or tremors  SKIN: No itching, burning, rashes, or lesions   MUSCULOSKELETAL: No joint pain or swelling; No muscle, back, or extremity pain  PSYCHIATRIC: No depression, anxiety, mood swings, or difficulty sleeping  All other review of systems negative    VITALS  T(C): 36.6 (06-01-21 @ 06:06), Max: 37 (05-31-21 @ 21:49)  HR: 66 (06-01-21 @ 06:06) (66 - 88)  BP: 134/74 (06-01-21 @ 06:06) (120/66 - 134/74)  RR: 18 (06-01-21 @ 06:06) (18 - 20)  SpO2: --  Wt(kg): --Vital Signs Last 24 Hrs  T(C): 36.6 (01 Jun 2021 06:06), Max: 37 (31 May 2021 21:49)  T(F): 97.9 (01 Jun 2021 06:06), Max: 98.6 (31 May 2021 21:49)  HR: 66 (01 Jun 2021 06:06) (66 - 88)  BP: 134/74 (01 Jun 2021 06:06) (120/66 - 134/74)  BP(mean): 134 (01 Jun 2021 06:06) (134 - 134)  RR: 18 (01 Jun 2021 06:06) (18 - 20)  SpO2: --      05-31-21 @ 07:01  -  06-01-21 @ 07:00  --------------------------------------------------------  IN: 680 mL / OUT: 1572 mL / NET: -892 mL        PHYSICAL EXAM:  GENERAL: NAD, well-groomed, well-developed  EYE: anicteric sclera, non injected conjunctiva, EOMI  ENT: hearing grossly intact, oropharynx clear  PSYCH: no agitation, baseline mentation  NERVOUS SYSTEM:  Alert & Oriented X3, Motor Strength 5/5 B/L upper and lower extremities; Sensory intact; FTN WNL  PULMONARY: Clear to percussion bilaterally; No rales, rhonchi, wheezing, or rubs  CARDIOVASCULAR: Regular rate and rhythm; No murmurs, rubs, or gallops  GI: Soft, Nontender, Nondistended; Bowel sounds present  EXTREMITIES:  2+ Peripheral Pulses, No clubbing, cyanosis, or edema  LYMPH: No lymphadenopathy noted  SKIN: No rashes or lesions    Consultant(s) Notes Reviewed:  [x ] YES  [ ] NO    Discussed with Consultants/Other Providers [ x] YES     LABS                          8.5    8.87  )-----------( 429      ( 01 Jun 2021 11:14 )             28.2     06-01    137  |  100  |  15  ----------------------------<  158<H>  3.9   |  29  |  0.8    Ca    7.9<L>      01 Jun 2021 11:14  Phos  2.9     06-01  Mg     1.8     06-01    TPro  6.5  /  Alb  2.5<L>  /  TBili  0.7  /  DBili  x   /  AST  35  /  ALT  17  /  AlkPhos  76  05-31    POCT Blood Glucose.: 176 mg/dL (01 Jun 2021 11:15)      RADIOLOGY & ADDITIONAL TESTS:  - no images 6/1    HEALTH ISSUES - PROBLEM Dx:  Knee pain, left      MEDICATIONS  (STANDING):  aspirin  chewable 81 milliGRAM(s) Oral daily  dextrose 40% Gel 15 Gram(s) Oral once  dextrose 5%. 1000 milliLiter(s) (50 mL/Hr) IV Continuous <Continuous>  dextrose 5%. 1000 milliLiter(s) (100 mL/Hr) IV Continuous <Continuous>  dextrose 50% Injectable 25 Gram(s) IV Push once  dextrose 50% Injectable 12.5 Gram(s) IV Push once  dextrose 50% Injectable 25 Gram(s) IV Push once  DULoxetine 90 milliGRAM(s) Oral daily  enoxaparin Injectable 40 milliGRAM(s) SubCutaneous daily  gabapentin 600 milliGRAM(s) Oral three times a day  glucagon  Injectable 1 milliGRAM(s) IntraMuscular once  insulin glargine Injectable (LANTUS) 20 Unit(s) SubCutaneous every morning  insulin lispro (ADMELOG) corrective regimen sliding scale   SubCutaneous three times a day before meals  insulin lispro Injectable (ADMELOG) 3 Unit(s) SubCutaneous three times a day before meals  nafcillin  IVPB 2 Gram(s) IV Intermittent every 4 hours  prasugrel 10 milliGRAM(s) Oral daily    MEDICATIONS  (PRN):  acetaminophen   Tablet .. 650 milliGRAM(s) Oral every 8 hours PRN Temp greater or equal to 38C (100.4F), Moderate Pain (4 - 6)  ALPRAZolam 0.5 milliGRAM(s) Oral every 8 hours PRN anxiety  cyclobenzaprine 5 milliGRAM(s) Oral two times a day PRN Muscle Spasm  morphine  - Injectable 2 milliGRAM(s) IV Push every 4 hours PRN Moderate Pain (4 - 6)  oxyCODONE    IR 5 milliGRAM(s) Oral every 4 hours PRN Severe Pain (7 - 10)

## 2021-06-01 NOTE — CONSULT NOTE ADULT - ATTENDING COMMENTS
Pt reports fall while being transported for care and injury to site of healed SG right knee several weeks ago     Site has not healed since     Exam -  two clean granulating shallow wounds     Continue wound care   Discussed with pt
65 year old patient with known ischemic cardiomyopathy admitted with septic arthritis currently euvolemic and compensated  - Continue to monitor I&O  - Will follow
Patient seen and examined my assessment and plan in notes above
Pt. seen/ examined  Labs/ vitals reviewed  Very high suspicion for septic arthritis of the left knee  Discussed risks/ benefits/ alternatives of the procedure  Will proceed to OR for I&D
above noted discussed case with surgical resident abdomen soft no distension drainage from drain site needs needs dye study of sinus tract
pt has been missing his insulin pump. Agree with transitioning to basal bolus as indicated above + correction scale(sliding scale). Might need titration of doses given his infection and also per last endocrine note he was 670g- manual mode, CIR 1:3 to 1: 6, basal 1.7 unit/hr, correction 1:25, which indicates he is quite insulin resistant. Discharge on basal bolus regimen- with sufficient refills until Pump can be reinitiated by his endocrinologist(Dr. Cormier).
65 year old male with type 2 DM using Medtronic 670 G insulin pump, in manual mode, with Freestyle storm CGM , admitted for inability to ambulate.   Endo consulted given malfunctioning insulin pump.    patient off the pump since admission as insertion site was accidentally removed, was refusing to get insulin, until last night when he got Lantus 15 units. HBA1c now 9% ,and POC above target .   Endo Dr. Cormier at [facility]. he was getting 1.7u/hr basal rate , I:C 1:3 and 1:6 and ISF 1:25 with target 100 and AIT 3 hours, uses FIASP in the pump.  -patient stating that wife will bring in pump infusion set and CGM today, if so he can resume pump and basal rate was adjusted and increased to 2units/hrs , and all inpatient insulin should be discontinued .   - if wife doesn't bring pump infusion set today , please give Lantus 25 units at bedtime today and as off tomorrow Lantus 45 units at bedtime , start lispro 6 units TIDAc with ISS 2:50 >150 TIDAC .

## 2021-06-01 NOTE — PROGRESS NOTE ADULT - ATTENDING COMMENTS
Pt. seen/ examined on 6/1/21 after removal of the drain  Today (6/2/21) labs/ vitals reviewed  May remove drain tomorrow  Discussed plan for discharge to rehab with patient and medicine attending  Will follow

## 2021-06-02 LAB
ANION GAP SERPL CALC-SCNC: 7 MMOL/L — SIGNIFICANT CHANGE UP (ref 7–14)
ANION GAP SERPL CALC-SCNC: 7 MMOL/L — SIGNIFICANT CHANGE UP (ref 7–14)
BUN SERPL-MCNC: 14 MG/DL — SIGNIFICANT CHANGE UP (ref 10–20)
BUN SERPL-MCNC: 15 MG/DL — SIGNIFICANT CHANGE UP (ref 10–20)
CALCIUM SERPL-MCNC: 8 MG/DL — LOW (ref 8.5–10.1)
CALCIUM SERPL-MCNC: 8.3 MG/DL — LOW (ref 8.5–10.1)
CHLORIDE SERPL-SCNC: 100 MMOL/L — SIGNIFICANT CHANGE UP (ref 98–110)
CHLORIDE SERPL-SCNC: 100 MMOL/L — SIGNIFICANT CHANGE UP (ref 98–110)
CO2 SERPL-SCNC: 27 MMOL/L — SIGNIFICANT CHANGE UP (ref 17–32)
CO2 SERPL-SCNC: 28 MMOL/L — SIGNIFICANT CHANGE UP (ref 17–32)
CREAT SERPL-MCNC: 0.8 MG/DL — SIGNIFICANT CHANGE UP (ref 0.7–1.5)
CREAT SERPL-MCNC: 0.9 MG/DL — SIGNIFICANT CHANGE UP (ref 0.7–1.5)
GLUCOSE BLDC GLUCOMTR-MCNC: 187 MG/DL — HIGH (ref 70–99)
GLUCOSE BLDC GLUCOMTR-MCNC: 229 MG/DL — HIGH (ref 70–99)
GLUCOSE BLDC GLUCOMTR-MCNC: 250 MG/DL — HIGH (ref 70–99)
GLUCOSE BLDC GLUCOMTR-MCNC: 257 MG/DL — HIGH (ref 70–99)
GLUCOSE SERPL-MCNC: 202 MG/DL — HIGH (ref 70–99)
GLUCOSE SERPL-MCNC: 243 MG/DL — HIGH (ref 70–99)
HCT VFR BLD CALC: 31.1 % — LOW (ref 42–52)
HGB BLD-MCNC: 9.4 G/DL — LOW (ref 14–18)
MAGNESIUM SERPL-MCNC: 1.8 MG/DL — SIGNIFICANT CHANGE UP (ref 1.8–2.4)
MCHC RBC-ENTMCNC: 22.7 PG — LOW (ref 27–31)
MCHC RBC-ENTMCNC: 30.2 G/DL — LOW (ref 32–37)
MCV RBC AUTO: 75.1 FL — LOW (ref 80–94)
NRBC # BLD: 0 /100 WBCS — SIGNIFICANT CHANGE UP (ref 0–0)
PHOSPHATE SERPL-MCNC: 3.2 MG/DL — SIGNIFICANT CHANGE UP (ref 2.1–4.9)
PLATELET # BLD AUTO: 499 K/UL — HIGH (ref 130–400)
POTASSIUM SERPL-MCNC: 4.5 MMOL/L — SIGNIFICANT CHANGE UP (ref 3.5–5)
POTASSIUM SERPL-MCNC: 6.1 MMOL/L — CRITICAL HIGH (ref 3.5–5)
POTASSIUM SERPL-SCNC: 4.5 MMOL/L — SIGNIFICANT CHANGE UP (ref 3.5–5)
POTASSIUM SERPL-SCNC: 6.1 MMOL/L — CRITICAL HIGH (ref 3.5–5)
RBC # BLD: 4.14 M/UL — LOW (ref 4.7–6.1)
RBC # FLD: 26 % — HIGH (ref 11.5–14.5)
SODIUM SERPL-SCNC: 134 MMOL/L — LOW (ref 135–146)
SODIUM SERPL-SCNC: 135 MMOL/L — SIGNIFICANT CHANGE UP (ref 135–146)
WBC # BLD: 9.5 K/UL — SIGNIFICANT CHANGE UP (ref 4.8–10.8)
WBC # FLD AUTO: 9.5 K/UL — SIGNIFICANT CHANGE UP (ref 4.8–10.8)

## 2021-06-02 PROCEDURE — 99233 SBSQ HOSP IP/OBS HIGH 50: CPT

## 2021-06-02 RX ORDER — INSULIN GLARGINE 100 [IU]/ML
25 INJECTION, SOLUTION SUBCUTANEOUS EVERY MORNING
Refills: 0 | Status: DISCONTINUED | OUTPATIENT
Start: 2021-06-03 | End: 2021-06-15

## 2021-06-02 RX ORDER — FUROSEMIDE 40 MG
40 TABLET ORAL DAILY
Refills: 0 | Status: DISCONTINUED | OUTPATIENT
Start: 2021-06-02 | End: 2021-06-15

## 2021-06-02 RX ORDER — ATORVASTATIN CALCIUM 80 MG/1
80 TABLET, FILM COATED ORAL AT BEDTIME
Refills: 0 | Status: DISCONTINUED | OUTPATIENT
Start: 2021-06-02 | End: 2021-06-15

## 2021-06-02 RX ORDER — METOPROLOL TARTRATE 50 MG
25 TABLET ORAL DAILY
Refills: 0 | Status: DISCONTINUED | OUTPATIENT
Start: 2021-06-02 | End: 2021-06-15

## 2021-06-02 RX ORDER — DIGOXIN 250 MCG
125 TABLET ORAL DAILY
Refills: 0 | Status: DISCONTINUED | OUTPATIENT
Start: 2021-06-02 | End: 2021-06-15

## 2021-06-02 RX ORDER — SPIRONOLACTONE 25 MG/1
25 TABLET, FILM COATED ORAL DAILY
Refills: 0 | Status: DISCONTINUED | OUTPATIENT
Start: 2021-06-03 | End: 2021-06-08

## 2021-06-02 RX ORDER — BACITRACIN ZINC 500 UNIT/G
1 OINTMENT IN PACKET (EA) TOPICAL DAILY
Refills: 0 | Status: DISCONTINUED | OUTPATIENT
Start: 2021-06-02 | End: 2021-06-02

## 2021-06-02 RX ADMIN — Medication 3 UNIT(S): at 11:30

## 2021-06-02 RX ADMIN — ENOXAPARIN SODIUM 40 MILLIGRAM(S): 100 INJECTION SUBCUTANEOUS at 12:11

## 2021-06-02 RX ADMIN — MORPHINE SULFATE 2 MILLIGRAM(S): 50 CAPSULE, EXTENDED RELEASE ORAL at 06:10

## 2021-06-02 RX ADMIN — GABAPENTIN 600 MILLIGRAM(S): 400 CAPSULE ORAL at 12:17

## 2021-06-02 RX ADMIN — Medication 3 UNIT(S): at 16:44

## 2021-06-02 RX ADMIN — MORPHINE SULFATE 2 MILLIGRAM(S): 50 CAPSULE, EXTENDED RELEASE ORAL at 18:30

## 2021-06-02 RX ADMIN — ATORVASTATIN CALCIUM 80 MILLIGRAM(S): 80 TABLET, FILM COATED ORAL at 21:29

## 2021-06-02 RX ADMIN — INSULIN GLARGINE 20 UNIT(S): 100 INJECTION, SOLUTION SUBCUTANEOUS at 08:20

## 2021-06-02 RX ADMIN — Medication 4: at 11:19

## 2021-06-02 RX ADMIN — GABAPENTIN 600 MILLIGRAM(S): 400 CAPSULE ORAL at 06:04

## 2021-06-02 RX ADMIN — MORPHINE SULFATE 2 MILLIGRAM(S): 50 CAPSULE, EXTENDED RELEASE ORAL at 06:47

## 2021-06-02 RX ADMIN — Medication 3 UNIT(S): at 08:15

## 2021-06-02 RX ADMIN — MORPHINE SULFATE 2 MILLIGRAM(S): 50 CAPSULE, EXTENDED RELEASE ORAL at 18:01

## 2021-06-02 RX ADMIN — OXYCODONE HYDROCHLORIDE 5 MILLIGRAM(S): 5 TABLET ORAL at 08:56

## 2021-06-02 RX ADMIN — GABAPENTIN 600 MILLIGRAM(S): 400 CAPSULE ORAL at 21:29

## 2021-06-02 RX ADMIN — MORPHINE SULFATE 2 MILLIGRAM(S): 50 CAPSULE, EXTENDED RELEASE ORAL at 12:02

## 2021-06-02 RX ADMIN — NAFCILLIN 200 GRAM(S): 10 INJECTION, POWDER, FOR SOLUTION INTRAVENOUS at 01:50

## 2021-06-02 RX ADMIN — MORPHINE SULFATE 2 MILLIGRAM(S): 50 CAPSULE, EXTENDED RELEASE ORAL at 01:58

## 2021-06-02 RX ADMIN — NAFCILLIN 200 GRAM(S): 10 INJECTION, POWDER, FOR SOLUTION INTRAVENOUS at 17:20

## 2021-06-02 RX ADMIN — PRASUGREL 10 MILLIGRAM(S): 5 TABLET, FILM COATED ORAL at 12:01

## 2021-06-02 RX ADMIN — DULOXETINE HYDROCHLORIDE 90 MILLIGRAM(S): 30 CAPSULE, DELAYED RELEASE ORAL at 12:01

## 2021-06-02 RX ADMIN — MORPHINE SULFATE 2 MILLIGRAM(S): 50 CAPSULE, EXTENDED RELEASE ORAL at 12:58

## 2021-06-02 RX ADMIN — Medication 81 MILLIGRAM(S): at 12:01

## 2021-06-02 RX ADMIN — NAFCILLIN 200 GRAM(S): 10 INJECTION, POWDER, FOR SOLUTION INTRAVENOUS at 06:04

## 2021-06-02 RX ADMIN — NAFCILLIN 200 GRAM(S): 10 INJECTION, POWDER, FOR SOLUTION INTRAVENOUS at 21:28

## 2021-06-02 RX ADMIN — OXYCODONE HYDROCHLORIDE 5 MILLIGRAM(S): 5 TABLET ORAL at 09:58

## 2021-06-02 RX ADMIN — Medication 4: at 16:44

## 2021-06-02 RX ADMIN — Medication 6: at 08:16

## 2021-06-02 RX ADMIN — NAFCILLIN 200 GRAM(S): 10 INJECTION, POWDER, FOR SOLUTION INTRAVENOUS at 13:44

## 2021-06-02 RX ADMIN — NAFCILLIN 200 GRAM(S): 10 INJECTION, POWDER, FOR SOLUTION INTRAVENOUS at 10:51

## 2021-06-02 NOTE — PROGRESS NOTE ADULT - SUBJECTIVE AND OBJECTIVE BOX
SUBJ: Patient seen and examined.       MEDICATIONS  (STANDING):  aspirin  chewable 81 milliGRAM(s) Oral daily  atorvastatin 80 milliGRAM(s) Oral at bedtime  dextrose 40% Gel 15 Gram(s) Oral once  dextrose 5%. 1000 milliLiter(s) (50 mL/Hr) IV Continuous <Continuous>  dextrose 5%. 1000 milliLiter(s) (100 mL/Hr) IV Continuous <Continuous>  dextrose 50% Injectable 25 Gram(s) IV Push once  dextrose 50% Injectable 12.5 Gram(s) IV Push once  dextrose 50% Injectable 25 Gram(s) IV Push once  digoxin     Tablet 125 MICROGram(s) Oral daily  DULoxetine 90 milliGRAM(s) Oral daily  enoxaparin Injectable 40 milliGRAM(s) SubCutaneous daily  gabapentin 600 milliGRAM(s) Oral three times a day  glucagon  Injectable 1 milliGRAM(s) IntraMuscular once  insulin lispro (ADMELOG) corrective regimen sliding scale   SubCutaneous three times a day before meals  insulin lispro Injectable (ADMELOG) 3 Unit(s) SubCutaneous three times a day before meals  metoprolol succinate ER 25 milliGRAM(s) Oral daily  nafcillin  IVPB 2 Gram(s) IV Intermittent every 4 hours  prasugrel 10 milliGRAM(s) Oral daily    MEDICATIONS  (PRN):  acetaminophen   Tablet .. 650 milliGRAM(s) Oral every 8 hours PRN Temp greater or equal to 38C (100.4F), Moderate Pain (4 - 6)  ALPRAZolam 0.5 milliGRAM(s) Oral every 8 hours PRN anxiety  cyclobenzaprine 5 milliGRAM(s) Oral two times a day PRN Muscle Spasm  furosemide    Tablet 40 milliGRAM(s) Oral daily PRN fluid overload  morphine  - Injectable 2 milliGRAM(s) IV Push every 4 hours PRN Moderate Pain (4 - 6)  oxyCODONE    IR 5 milliGRAM(s) Oral every 4 hours PRN Severe Pain (7 - 10)            Vital Signs Last 24 Hrs  T(C): 36.4 (02 Jun 2021 20:11), Max: 36.6 (02 Jun 2021 05:18)  T(F): 97.5 (02 Jun 2021 20:11), Max: 97.9 (02 Jun 2021 12:57)  HR: 97 (02 Jun 2021 20:11) (83 - 99)  BP: 123/69 (02 Jun 2021 20:11) (105/58 - 131/68)  BP(mean): --  RR: 18 (02 Jun 2021 20:11) (18 - 18)  SpO2: 99% (02 Jun 2021 20:11) (99% - 99%)     REVIEW OF SYSTEMS:  CONSTITUTIONAL: No fever  NECK: No pain or stiffness  CARDIOVASCULAR: patient denies chest pain, shortness of breath or palpitations .  Repiratory: No cough or wheezing.  NEUROLOGICAL: No focal deficits to report.  GI: no BRBPR, no N,V,diarrhea.    PSYCHIATRY: normal mood and affect        PHYSICAL EXAM:  GENERAL: NAD  HEAD:  Atraumatic, Normocephalic  NECK: Supple, No JVD  NERVOUS SYSTEM:  Alert & Oriented X3, Good concentration  CHEST/LUNG: Clear to anterior auscultation bilaterally  HEART: Regular rate and rhythm  ABDOMEN: Soft, Nontender, Nondistended;       	  TELEMETRY:      LABS:                        9.4    9.50  )-----------( 499      ( 02 Jun 2021 05:30 )             31.1     06-02    135  |  100  |  14  ----------------------------<  243<H>  4.5   |  28  |  0.8    Ca    8.3<L>      02 Jun 2021 11:16  Phos  3.2     06-02  Mg     1.8     06-02              I&O's Summary    01 Jun 2021 07:01  -  02 Jun 2021 07:00  --------------------------------------------------------  IN: 0 mL / OUT: 1335 mL / NET: -1335 mL      BNP  RADIOLOGY & ADDITIONAL STUDIES:    IMPRESSION AND PLAN:    CAD s/p PCI to RCA and OM1, s/p LIMA to LAD (2018)   Severe ischemic cardiomyopathy (EF 15-20%) s/p CRT-D - currently compensated - not fluid overloaded  DMII, HTN, DL  Currently admitted with septic arthritis of the knee s/p drainage, irrigation and debridement   r/o PAD    - Bilateral LE arterial duplex (non-urgent, please consult with ortho first because the dressing and brace have to be removed for the study)  c/w ASA, Effient and Atorvastatin  c/w GDMT for HFrEF   Will F/U

## 2021-06-02 NOTE — PROGRESS NOTE ADULT - SUBJECTIVE AND OBJECTIVE BOX
FLOWER MARISCAL  65y, Male  Allergy: ACE inhibitors (Hives)  carvedilol (Other)  enalapril (Hives)  Entresto (Other)    Hospital Day: 37d    Patient seen and examined earlier today. Patient endorses some LLE discomfort. Also concerned about ongoing issues with GB, and RLE discomfort. Not fully agreeable to STR, but is going to consider it.     PMH/PSH:  PAST MEDICAL & SURGICAL HISTORY:  Status post percutaneous transluminal coronary angioplasty  2 stents    Atherosclerosis of coronary artery  CAD (coronary artery disease)    Osteoarthritis    HLD (hyperlipidemia)    Blood clot due to device, implant, or graft  was on blood thinners    Diabetes  on insulin pump    HTN (hypertension)    CHF (congestive heart failure)  EF ~ 25%    History of celiac disease    Diverticulitis    STEMI (ST elevation myocardial infarction)    Diabetic neuropathy    Anxiety and depression    Other postprocedural status  Fixation hardware in foot LEFT    Stented coronary artery  10/18 heart attack  INFERIOR WALL MI    S/P CABG x 1  2018    S/P TKR (total knee replacement), right  with infection Mrsa   per pt he was cleared from MRSA infection    Surgery, elective  right knee wound debridement    History of open reduction and internal fixation (ORIF) procedure    H/O shoulder surgery  right    AICD (automatic cardioverter/defibrillator) present    Cholecystostomy care  drain inserted 2020 &amp; removed 4 months ago    History of tonsillectomy    History of hip replacement, total, right        LAST 24-Hr EVENTS:    VITALS:  T(F): 97.8 (06-02-21 @ 05:18), Max: 98.8 (06-01-21 @ 19:50)  HR: 99 (06-02-21 @ 05:18)  BP: 131/68 (06-02-21 @ 05:18) (109/61 - 131/73)  RR: 18 (06-02-21 @ 05:18)  SpO2: --        TESTS & MEASUREMENTS:  Weight (Kg):       05-31-21 @ 07:01  -  06-01-21 @ 07:00  --------------------------------------------------------  IN: 680 mL / OUT: 1572 mL / NET: -892 mL    06-01-21 @ 07:01  -  06-02-21 @ 07:00  --------------------------------------------------------  IN: 0 mL / OUT: 1335 mL / NET: -1335 mL                            9.4    9.50  )-----------( 499      ( 02 Jun 2021 05:30 )             31.1       06-02    134<L>  |  100  |  15  ----------------------------<  202<H>  6.1<HH>   |  27  |  0.9    Ca    8.0<L>      02 Jun 2021 05:30  Phos  3.2     06-02  Mg     1.8     06-02              Culture - Surgical Swab (collected 05-27-21 @ 09:05)  Source: .Surgical Swab None  Final Report (06-01-21 @ 18:59):    No growth at 5 days    Culture - Surgical Swab (collected 05-27-21 @ 09:05)  Source: .Surgical Swab None  Final Report (06-01-21 @ 19:00):    No growth at 5 days                    RADIOLOGY, ECG, & ADDITIONAL TESTS:      RECENT DIAGNOSTIC ORDERS:  Basic Metabolic Panel: Routine (06-02-21 @ 07:52)      MEDICATIONS:  MEDICATIONS  (STANDING):  aspirin  chewable 81 milliGRAM(s) Oral daily  dextrose 40% Gel 15 Gram(s) Oral once  dextrose 5%. 1000 milliLiter(s) (100 mL/Hr) IV Continuous <Continuous>  dextrose 5%. 1000 milliLiter(s) (50 mL/Hr) IV Continuous <Continuous>  dextrose 50% Injectable 25 Gram(s) IV Push once  dextrose 50% Injectable 12.5 Gram(s) IV Push once  dextrose 50% Injectable 25 Gram(s) IV Push once  DULoxetine 90 milliGRAM(s) Oral daily  enoxaparin Injectable 40 milliGRAM(s) SubCutaneous daily  gabapentin 600 milliGRAM(s) Oral three times a day  glucagon  Injectable 1 milliGRAM(s) IntraMuscular once  insulin glargine Injectable (LANTUS) 20 Unit(s) SubCutaneous every morning  insulin lispro (ADMELOG) corrective regimen sliding scale   SubCutaneous three times a day before meals  insulin lispro Injectable (ADMELOG) 3 Unit(s) SubCutaneous three times a day before meals  nafcillin  IVPB 2 Gram(s) IV Intermittent every 4 hours  prasugrel 10 milliGRAM(s) Oral daily    MEDICATIONS  (PRN):  acetaminophen   Tablet .. 650 milliGRAM(s) Oral every 8 hours PRN Temp greater or equal to 38C (100.4F), Moderate Pain (4 - 6)  ALPRAZolam 0.5 milliGRAM(s) Oral every 8 hours PRN anxiety  cyclobenzaprine 5 milliGRAM(s) Oral two times a day PRN Muscle Spasm  morphine  - Injectable 2 milliGRAM(s) IV Push every 4 hours PRN Moderate Pain (4 - 6)  oxyCODONE    IR 5 milliGRAM(s) Oral every 4 hours PRN Severe Pain (7 - 10)      HOME MEDICATIONS:  ALPRAZolam 0.5 mg oral tablet (04-26)  aspirin 81 mg oral delayed release tablet (04-26)  digoxin 125 mcg (0.125 mg) oral tablet (04-26)  DULoxetine (04-26)  insulin (04-26)  Jardiance 10 mg oral tablet (04-26)  melatonin 10 mg oral tablet (04-26)  metoprolol succinate 25 mg oral tablet, extended release (04-26)  morphine 15 mg oral tablet (04-26)  prasugrel 10 mg oral tablet (04-26)  spironolactone 25 mg oral tablet (04-26)      PHYSICAL EXAM:  GENERAL: NAD, well-groomed, well-developed  EYE: anicteric sclera, non injected conjunctiva, EOMI  ENT: hearing grossly intact, oropharynx clear  PSYCH: no agitation, baseline mentation  NERVOUS SYSTEM:  Alert & Oriented X3, moving all extremities normally   PULMONARY: Clear to auscultation bilaterally; No rales, rhonchi, wheezing, or rubs  CARDIOVASCULAR: Regular rate and rhythm; No murmurs, rubs, or gallops  GI: Soft, Nontender, Nondistended; Bowel sounds present  EXTREMITIES:  2+ Peripheral Pulses, No clubbing, cyanosis, or edema  LYMPH: No lymphadenopathy noted  SKIN: No rashes or lesions

## 2021-06-02 NOTE — PROGRESS NOTE ADULT - SUBJECTIVE AND OBJECTIVE BOX
Chief Complaint: L knee pain    Patient seen and examined at bedside    Patient still having soreness/tension type feeling in the left knee. I explained to him as long as it is not a sharpness ot the pain, that is more acceptable as this type of sensation, the sensation of distension and pressure, is not likely to be alleviated considerable,  safely, with narcotic medications.     PAST MEDICAL & SURGICAL HISTORY:  Status post percutaneous transluminal coronary angioplasty  2 stents    Atherosclerosis of coronary artery  CAD (coronary artery disease)    Osteoarthritis    HLD (hyperlipidemia)    Blood clot due to device, implant, or graft  was on blood thinners    Diabetes  on insulin pump    HTN (hypertension)    CHF (congestive heart failure)  EF ~ 25%    History of celiac disease    Diverticulitis    STEMI (ST elevation myocardial infarction)    Diabetic neuropathy    Anxiety and depression    Other postprocedural status  Fixation hardware in foot LEFT    Stented coronary artery  10/18 heart attack  INFERIOR WALL MI    S/P CABG x 1  2018    S/P TKR (total knee replacement), right  with infection Mrsa   per pt he was cleared from MRSA infection    Surgery, elective  right knee wound debridement    History of open reduction and internal fixation (ORIF) procedure    H/O shoulder surgery  right    AICD (automatic cardioverter/defibrillator) present    Cholecystostomy care  drain inserted 2020 &amp; removed 4 months ago    History of tonsillectomy    History of hip replacement, total, right        SOCIAL HISTORY:  [ ] Denies Smoking, Alcohol, or Drug Use    Allergies    ACE inhibitors (Hives)  carvedilol (Other)  enalapril (Hives)  Entresto (Other)    Intolerances        PAIN MEDICATIONS:  acetaminophen   Tablet .. 650 milliGRAM(s) Oral every 8 hours PRN  ALPRAZolam 0.5 milliGRAM(s) Oral every 8 hours PRN  cyclobenzaprine 5 milliGRAM(s) Oral two times a day PRN  DULoxetine 90 milliGRAM(s) Oral daily  gabapentin 600 milliGRAM(s) Oral three times a day  morphine  - Injectable 2 milliGRAM(s) IV Push every 4 hours PRN  oxyCODONE    IR 5 milliGRAM(s) Oral every 4 hours PRN    Heme:  aspirin  chewable 81 milliGRAM(s) Oral daily  enoxaparin Injectable 40 milliGRAM(s) SubCutaneous daily  prasugrel 10 milliGRAM(s) Oral daily    Antibiotics:  nafcillin  IVPB 2 Gram(s) IV Intermittent every 4 hours    Cardiac:    Pulmonary:    Endocrine  dextrose 40% Gel 15 Gram(s) Oral once  dextrose 50% Injectable 25 Gram(s) IV Push once  dextrose 50% Injectable 12.5 Gram(s) IV Push once  dextrose 50% Injectable 25 Gram(s) IV Push once  glucagon  Injectable 1 milliGRAM(s) IntraMuscular once  insulin lispro (ADMELOG) corrective regimen sliding scale   SubCutaneous three times a day before meals  insulin lispro Injectable (ADMELOG) 3 Unit(s) SubCutaneous three times a day before meals    GI:    All Other Meds:  dextrose 5%. 1000 milliLiter(s) IV Continuous <Continuous>  dextrose 5%. 1000 milliLiter(s) IV Continuous <Continuous>      REVIEW OF SYSTEMS:  CONSTITUTIONAL: Negative fever,chills  NECK: No pain or stiffness  RESPIRATORY: No cough, wheezing,  No shortness of breath  GASTROINTESTINAL: No abdominal, No nausea, vomiting; No diarrhea or constipation.   GENITOURINARY: No dysuria, frequency, or incontinence  NEUROLOGICAL: No headaches, memory loss, loss of strength, numbness, or  SKIN: + lesions on legs  MUSCULOSKELETAL: + L and R knee pain    PHYSICAL EXAM:    Vital Signs Last 24 Hrs  T(C): 36.6 (02 Jun 2021 05:18), Max: 37.1 (01 Jun 2021 19:50)  T(F): 97.8 (02 Jun 2021 05:18), Max: 98.8 (01 Jun 2021 19:50)  HR: 99 (02 Jun 2021 05:18) (84 - 99)  BP: 131/68 (02 Jun 2021 05:18) (109/61 - 131/73)  BP(mean): --  RR: 18 (02 Jun 2021 05:18) (18 - 19)  SpO2: --    PAIN SCORE:    7     SCALE USED: (1-10 VNRS)       GENERAL: Comfortable, in no apparent distress  HEENT:  Atraumatic, Normocephalic, PERRL, EOMI  NECK: Supple  LUNG: Respiration even and unlabored, no distress noted  ABDOMEN: Soft, Nontender, Nondistended; Dressing C/D/I  BACK: No midline bony tenderness to palpation, no step off or deformity noted.   EXTREMITIES:  ROSEN X 4; No clubbing, cyanosis, or edema. L knee MIS drain  NEUROLOGIC: Awake, alert and oriented X3;  No focal deficits. Decreaed motion L leg 2/2 pain Sensation intact to light touch  SKIN: Warm and Dry    LABS:                          9.4    9.50  )-----------( 499      ( 02 Jun 2021 05:30 )             31.1     06-02    134<L>  |  100  |  15  ----------------------------<  202<H>  6.1<HH>   |  27  |  0.9    Ca    8.0<L>      02 Jun 2021 05:30  Phos  3.2     06-02  Mg     1.8     06-02            Drug Screen:        RADIOLOGY:        [ ]  [place]  Reviewed and Copied to Chart

## 2021-06-02 NOTE — PROGRESS NOTE ADULT - ASSESSMENT
65 M opioid tolerant patient with L knee septic arthritis s/p drainage still with pain  - Would attempt to d/c IV opioids  - Consider Oxycodone ER 15mg q12h  - Consider oxycodone 10mg q4h PRN for severe pain  - Tylenol 1000mg TID  - Baclofen 10mg TID PRN for muscle spasms

## 2021-06-02 NOTE — PROGRESS NOTE ADULT - ASSESSMENT
66 yo male admitted with left septic knee and severe PVD    # Left Knee Septic Arthritis with MSSA bacteremia   - repeat Blood Cx negative   - S/P left knee wash out POD #6 by ortho  - Ortho following; recs appreciated          Left knee lateral drain removed (6/1)          If output stable from Medial Drain then plan to remove today per Ortho  - ID following: -  will need 6 weeks from time of last debridement (5/27-7/7) -- to finish with cefazolin 2g q 8 hours  - PICC line ordered and pending   - PT following; STR vs home w/ PT     #Biliary Drainage from previous perc sudhir site- resolved   - Previous Intraabdominal Cx 9/4/2020 -- VRE faecium and pseudomonas aeruginosa  - Wound Cx 5/1 growing Pseudomonas/VRE Faecium  - CT Abdomen and Pelvis w/ IV Cont (04.26.21 @ 04:42): No evidence of acute intra-abdominal pathology. Decreased area of right upper quadrant subcutaneous stranding at site of prior percutaneous cholecystostomy, without evidence of abscess  - HIDA scan unable to visualize contrast in GB   - spoke w/ Dr. 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    # PVD  - Arterial doppler 5/29 of bilateral LE show normal arterial flow in the bilateral lower extremities. Limited exam due to bandages  - Vascular Cardiology team following, would prefer test to be done when bandages are removed   Bilateral LE arterial duplex (non-urgent, please consult with ortho first because the dressing and brace have to be removed for the study)  c/w ASA and Effient and high intensity statin   c/w GDMT for HFrEF     # DM2 c/b hyperglycemia   - increase Lantus to 25u qam   - cont lispro 3 tid w/ meals   - SSI      #Microcytic Anemia   - Iron  def combined with ACD    #CAD s/p PCI to RCA and OM1, s/p LIMA to LAD (2018)   #Severe ischemic cardiomyopathy w/ reduced EF s/p CRT-D   #HTN   #DLD   - JOHN PAUL 5/2021 w/ ef 20-25%  c/w ASA and Effient   Resume high intensity statin  resume GDMT( Toprol, Aldactone, Digoxn, Lasix prn )  for HFrEF     # Covid vaccine status:   He has received one dose and is supposed to receive the second dose on 5/15/21 but he was already hospitalized. ID on board and will appreciate their input on that.      # DVT ppx: Lovenox      #Progress Note Handoff:   Pending (specify):  PICC line, Ortho removal of medial drain, LE Arterial Duplex   Family discussion: d/w patient at length   Disposition: Home vs SNF in 24-48 hours       Krystne Ohara DO

## 2021-06-03 LAB
ALBUMIN SERPL ELPH-MCNC: 2.7 G/DL — LOW (ref 3.5–5.2)
ALP SERPL-CCNC: 86 U/L — SIGNIFICANT CHANGE UP (ref 30–115)
ALT FLD-CCNC: 27 U/L — SIGNIFICANT CHANGE UP (ref 0–41)
ANION GAP SERPL CALC-SCNC: 10 MMOL/L — SIGNIFICANT CHANGE UP (ref 7–14)
AST SERPL-CCNC: 30 U/L — SIGNIFICANT CHANGE UP (ref 0–41)
BILIRUB SERPL-MCNC: 0.6 MG/DL — SIGNIFICANT CHANGE UP (ref 0.2–1.2)
BUN SERPL-MCNC: 17 MG/DL — SIGNIFICANT CHANGE UP (ref 10–20)
CALCIUM SERPL-MCNC: 8.4 MG/DL — LOW (ref 8.5–10.1)
CHLORIDE SERPL-SCNC: 100 MMOL/L — SIGNIFICANT CHANGE UP (ref 98–110)
CO2 SERPL-SCNC: 26 MMOL/L — SIGNIFICANT CHANGE UP (ref 17–32)
CREAT SERPL-MCNC: 0.8 MG/DL — SIGNIFICANT CHANGE UP (ref 0.7–1.5)
CULTURE RESULTS: SIGNIFICANT CHANGE UP
GLUCOSE BLDC GLUCOMTR-MCNC: 172 MG/DL — HIGH (ref 70–99)
GLUCOSE BLDC GLUCOMTR-MCNC: 190 MG/DL — HIGH (ref 70–99)
GLUCOSE BLDC GLUCOMTR-MCNC: 205 MG/DL — HIGH (ref 70–99)
GLUCOSE BLDC GLUCOMTR-MCNC: 211 MG/DL — HIGH (ref 70–99)
GLUCOSE SERPL-MCNC: 226 MG/DL — HIGH (ref 70–99)
HCT VFR BLD CALC: 29.5 % — LOW (ref 42–52)
HGB BLD-MCNC: 8.8 G/DL — LOW (ref 14–18)
MCHC RBC-ENTMCNC: 22.3 PG — LOW (ref 27–31)
MCHC RBC-ENTMCNC: 29.8 G/DL — LOW (ref 32–37)
MCV RBC AUTO: 74.9 FL — LOW (ref 80–94)
NRBC # BLD: 0 /100 WBCS — SIGNIFICANT CHANGE UP (ref 0–0)
PLATELET # BLD AUTO: 477 K/UL — HIGH (ref 130–400)
POTASSIUM SERPL-MCNC: 5 MMOL/L — SIGNIFICANT CHANGE UP (ref 3.5–5)
POTASSIUM SERPL-SCNC: 5 MMOL/L — SIGNIFICANT CHANGE UP (ref 3.5–5)
PROT SERPL-MCNC: 6.7 G/DL — SIGNIFICANT CHANGE UP (ref 6–8)
RBC # BLD: 3.94 M/UL — LOW (ref 4.7–6.1)
RBC # FLD: 25.7 % — HIGH (ref 11.5–14.5)
SODIUM SERPL-SCNC: 136 MMOL/L — SIGNIFICANT CHANGE UP (ref 135–146)
WBC # BLD: 9.54 K/UL — SIGNIFICANT CHANGE UP (ref 4.8–10.8)
WBC # FLD AUTO: 9.54 K/UL — SIGNIFICANT CHANGE UP (ref 4.8–10.8)

## 2021-06-03 PROCEDURE — 99233 SBSQ HOSP IP/OBS HIGH 50: CPT

## 2021-06-03 RX ADMIN — NAFCILLIN 200 GRAM(S): 10 INJECTION, POWDER, FOR SOLUTION INTRAVENOUS at 21:02

## 2021-06-03 RX ADMIN — MORPHINE SULFATE 2 MILLIGRAM(S): 50 CAPSULE, EXTENDED RELEASE ORAL at 00:25

## 2021-06-03 RX ADMIN — OXYCODONE HYDROCHLORIDE 5 MILLIGRAM(S): 5 TABLET ORAL at 22:59

## 2021-06-03 RX ADMIN — GABAPENTIN 600 MILLIGRAM(S): 400 CAPSULE ORAL at 05:30

## 2021-06-03 RX ADMIN — MORPHINE SULFATE 2 MILLIGRAM(S): 50 CAPSULE, EXTENDED RELEASE ORAL at 21:16

## 2021-06-03 RX ADMIN — Medication 81 MILLIGRAM(S): at 11:31

## 2021-06-03 RX ADMIN — OXYCODONE HYDROCHLORIDE 5 MILLIGRAM(S): 5 TABLET ORAL at 17:26

## 2021-06-03 RX ADMIN — GABAPENTIN 600 MILLIGRAM(S): 400 CAPSULE ORAL at 13:52

## 2021-06-03 RX ADMIN — Medication 125 MICROGRAM(S): at 05:30

## 2021-06-03 RX ADMIN — Medication 25 MILLIGRAM(S): at 05:30

## 2021-06-03 RX ADMIN — OXYCODONE HYDROCHLORIDE 5 MILLIGRAM(S): 5 TABLET ORAL at 05:30

## 2021-06-03 RX ADMIN — Medication 3 UNIT(S): at 07:45

## 2021-06-03 RX ADMIN — GABAPENTIN 600 MILLIGRAM(S): 400 CAPSULE ORAL at 21:02

## 2021-06-03 RX ADMIN — DULOXETINE HYDROCHLORIDE 90 MILLIGRAM(S): 30 CAPSULE, DELAYED RELEASE ORAL at 11:32

## 2021-06-03 RX ADMIN — INSULIN GLARGINE 25 UNIT(S): 100 INJECTION, SOLUTION SUBCUTANEOUS at 07:42

## 2021-06-03 RX ADMIN — NAFCILLIN 200 GRAM(S): 10 INJECTION, POWDER, FOR SOLUTION INTRAVENOUS at 02:50

## 2021-06-03 RX ADMIN — Medication 3 UNIT(S): at 17:23

## 2021-06-03 RX ADMIN — CYCLOBENZAPRINE HYDROCHLORIDE 5 MILLIGRAM(S): 10 TABLET, FILM COATED ORAL at 17:16

## 2021-06-03 RX ADMIN — Medication 0.5 MILLIGRAM(S): at 11:40

## 2021-06-03 RX ADMIN — ENOXAPARIN SODIUM 40 MILLIGRAM(S): 100 INJECTION SUBCUTANEOUS at 11:31

## 2021-06-03 RX ADMIN — Medication 4: at 07:40

## 2021-06-03 RX ADMIN — NAFCILLIN 200 GRAM(S): 10 INJECTION, POWDER, FOR SOLUTION INTRAVENOUS at 14:54

## 2021-06-03 RX ADMIN — NAFCILLIN 200 GRAM(S): 10 INJECTION, POWDER, FOR SOLUTION INTRAVENOUS at 11:31

## 2021-06-03 RX ADMIN — SPIRONOLACTONE 25 MILLIGRAM(S): 25 TABLET, FILM COATED ORAL at 05:31

## 2021-06-03 RX ADMIN — CYCLOBENZAPRINE HYDROCHLORIDE 5 MILLIGRAM(S): 10 TABLET, FILM COATED ORAL at 21:16

## 2021-06-03 RX ADMIN — PRASUGREL 10 MILLIGRAM(S): 5 TABLET, FILM COATED ORAL at 14:54

## 2021-06-03 RX ADMIN — Medication 3 UNIT(S): at 11:30

## 2021-06-03 RX ADMIN — MORPHINE SULFATE 2 MILLIGRAM(S): 50 CAPSULE, EXTENDED RELEASE ORAL at 11:40

## 2021-06-03 RX ADMIN — Medication 2: at 17:22

## 2021-06-03 RX ADMIN — NAFCILLIN 200 GRAM(S): 10 INJECTION, POWDER, FOR SOLUTION INTRAVENOUS at 05:29

## 2021-06-03 RX ADMIN — OXYCODONE HYDROCHLORIDE 5 MILLIGRAM(S): 5 TABLET ORAL at 22:29

## 2021-06-03 RX ADMIN — ATORVASTATIN CALCIUM 80 MILLIGRAM(S): 80 TABLET, FILM COATED ORAL at 21:02

## 2021-06-03 RX ADMIN — NAFCILLIN 200 GRAM(S): 10 INJECTION, POWDER, FOR SOLUTION INTRAVENOUS at 17:26

## 2021-06-03 RX ADMIN — MORPHINE SULFATE 2 MILLIGRAM(S): 50 CAPSULE, EXTENDED RELEASE ORAL at 21:01

## 2021-06-03 RX ADMIN — Medication 2: at 11:30

## 2021-06-03 RX ADMIN — OXYCODONE HYDROCHLORIDE 5 MILLIGRAM(S): 5 TABLET ORAL at 18:26

## 2021-06-03 NOTE — CONSULT NOTE ADULT - REASON FOR ADMISSION
Septic arthritis
Left knee septic arthritis
Septic arthritis

## 2021-06-03 NOTE — CONSULT NOTE ADULT - SUBJECTIVE AND OBJECTIVE BOX
HPI:  65 year old male with PMH of CAD s/p MI, PCI/CABG, CHFrEF (15-20%), has ICD, HTN, celiac disease, DM, neuropathy, chronic b/l LE ulcers presents, severe chronic pain,  hx infected R TKR with MRSA (was eventually cemented so has limited ROM R knee), and history of cholecystostomy tube placement in February 2020 for acute cholecystitis s/p removal in May 2020 with multiple presentations including persistent bilious drainage from the prior tract site as well as a RUQ abdominal abscess at the site s/p drainage. He presented to ED with inability to walk/ambulate 2/2 left knee pain. Pt has hx of knee pain and infections. 2 weeks ago pt received a 'steroid' shot for his left knee for pain. 2 days later, his knee began to swell and he was unable to walk or bend the knee. It progressively gotten worse to the point where the pain was unbearable so he presented to the ED.     Pt endorses a weight loss >70 lbs over the last year, there is bilious drainage coming from where he had a prior per sudhir, jaundice in the finger tips and sclera. Recent admission for uncontrollable movements, no diagnosis given. Pt denies f/c/n/v, chest pain, headaches, UTI symptoms     In the ED:  Joint was aspirated cloudy yellow fluid was seen and sent for cultures. Neutrophil count >50,000 and RBC >8000. Ortho consulted for septic arthritis, s/p arthroscopic drainage. Surg consulted for bile drainage, HIDA scan, consult GI, possible ERCP. CT A&P, No evidence of acute intra-abdominal pathology. Decreased area of right upper quadrant subcutaneous stranding at site of prior percutaneous cholecystostomy, without evidence of abscess. Vitally stable. Admitted to medicine for medical management.    The patient is being followed by Neurology, and he is followed by Cardiology Dr. Lopez in  and Dr. Lilia Caldera in [facility], also by Endo Dr. Cormier at [facility].  # Left Knee Septic Arthritis with MSSA bacteremia   - repeat Blood Cx negative   - S/P left knee wash out POD #7 by ortho          Left knee lateral drain removed (6/1), Medial knee drain and occlusive dressing removed on 6/3  - ID following: -  will need 6 weeks from time of last debridement (5/27-7/7) -- to finish with cefazolin 2g q 8 hours  - PICC line ordered but patient refusing until he feels he is ready to be discharged           PAST MEDICAL & SURGICAL HISTORY:  Status post percutaneous transluminal coronary angioplasty  2 stents    Atherosclerosis of coronary artery  CAD (coronary artery disease)    Osteoarthritis    HLD (hyperlipidemia)    Blood clot due to device, implant, or graft  was on blood thinners    Diabetes  on insulin pump    HTN (hypertension)    CHF (congestive heart failure)  EF ~ 25%    History of celiac disease    Diverticulitis    STEMI (ST elevation myocardial infarction)    Diabetic neuropathy    Anxiety and depression    Other postprocedural status  Fixation hardware in foot LEFT    Stented coronary artery  10/18 heart attack  INFERIOR WALL MI    S/P CABG x 1  2018    S/P TKR (total knee replacement), right  with infection Mrsa   per pt he was cleared from MRSA infection    Surgery, elective  right knee wound debridement    History of open reduction and internal fixation (ORIF) procedure    H/O shoulder surgery  right    AICD (automatic cardioverter/defibrillator) present    Cholecystostomy care  drain inserted 2020 &amp; removed 4 months ago    History of tonsillectomy    History of hip replacement, total, right        Hospital Course:    TODAY'S SUBJECTIVE & REVIEW OF SYMPTOMS:     Constitutional WNL   Cardio WNL   Resp WNL   GI WNL  Heme WNL  Endo WNL  Skin WNL  MSK left knee pain / weakness  Neuro WNL  Cognitive WNL  Psych WNL      MEDICATIONS  (STANDING):  aspirin  chewable 81 milliGRAM(s) Oral daily  atorvastatin 80 milliGRAM(s) Oral at bedtime  dextrose 40% Gel 15 Gram(s) Oral once  dextrose 5%. 1000 milliLiter(s) (50 mL/Hr) IV Continuous <Continuous>  dextrose 5%. 1000 milliLiter(s) (100 mL/Hr) IV Continuous <Continuous>  dextrose 50% Injectable 25 Gram(s) IV Push once  dextrose 50% Injectable 12.5 Gram(s) IV Push once  dextrose 50% Injectable 25 Gram(s) IV Push once  digoxin     Tablet 125 MICROGram(s) Oral daily  DULoxetine 90 milliGRAM(s) Oral daily  enoxaparin Injectable 40 milliGRAM(s) SubCutaneous daily  gabapentin 600 milliGRAM(s) Oral three times a day  glucagon  Injectable 1 milliGRAM(s) IntraMuscular once  insulin glargine Injectable (LANTUS) 25 Unit(s) SubCutaneous every morning  insulin lispro (ADMELOG) corrective regimen sliding scale   SubCutaneous three times a day before meals  insulin lispro Injectable (ADMELOG) 3 Unit(s) SubCutaneous three times a day before meals  metoprolol succinate ER 25 milliGRAM(s) Oral daily  nafcillin  IVPB 2 Gram(s) IV Intermittent every 4 hours  prasugrel 10 milliGRAM(s) Oral daily  spironolactone 25 milliGRAM(s) Oral daily    MEDICATIONS  (PRN):  acetaminophen   Tablet .. 650 milliGRAM(s) Oral every 8 hours PRN Temp greater or equal to 38C (100.4F), Moderate Pain (4 - 6)  ALPRAZolam 0.5 milliGRAM(s) Oral every 8 hours PRN anxiety  cyclobenzaprine 5 milliGRAM(s) Oral two times a day PRN Muscle Spasm  furosemide    Tablet 40 milliGRAM(s) Oral daily PRN fluid overload  morphine  - Injectable 2 milliGRAM(s) IV Push every 4 hours PRN Moderate Pain (4 - 6)  oxyCODONE    IR 5 milliGRAM(s) Oral every 4 hours PRN Severe Pain (7 - 10)      FAMILY HISTORY:  Family history of heart disease (Mother)    Family history of allergies  daughter        Allergies    ACE inhibitors (Hives)  carvedilol (Other)  enalapril (Hives)  Entresto (Other)    Intolerances        SOCIAL HISTORY:    [  ] Etoh  [  ] Smoking  [  ] Substance abuse     Home Environment:  [   ] Home Alone  [ x  ] Lives with Family  [   ] Home Health Aid    Dwelling:  [   ] Apartment  [ x  ] Private House  [   ] Adult Home  [   ] Skilled Nursing Facility      [   ] Short Term  [   ] Long Term  [ x  ] Stairs       Elevator [   ]    FUNCTIONAL STATUS PTA: (Check all that apply)  Ambulation: [   x ]Independent    [   ] Dependent     [   ] Non-Ambulatory  Assistive Device: [   ] SA Cane  [   ]  Q Cane  [   ] Walker  [   ]  Wheelchair  ADL : [  x ] Independent  [    ]  Dependent       Vital Signs Last 24 Hrs  T(C): 37.2 (03 Jun 2021 12:52), Max: 37.2 (03 Jun 2021 12:52)  T(F): 99 (03 Jun 2021 12:52), Max: 99 (03 Jun 2021 12:52)  HR: 79 (03 Jun 2021 12:52) (79 - 97)  BP: 117/58 (03 Jun 2021 12:52) (117/58 - 133/74)  BP(mean): --  RR: 18 (03 Jun 2021 12:52) (18 - 18)  SpO2: 99% (02 Jun 2021 20:11) (99% - 99%)      PHYSICAL EXAM: Awake & Alert  GENERAL: NAD  HEAD:  Normocephalic  CHEST/LUNG: Clear   HEART: S1S2+  ABDOMEN: Soft, Nontender  EXTREMITIES:  no calf tenderness, + tenderness left knee    NERVOUS SYSTEM:  Cranial Nerves 2-12 intact [   ] Abnormal  [   ]  ROM: WFL all extremities [   ]  Abnormal [ x  ]limited lle  Motor Strength: WFL all extremities  [   ]  Abnormal [ x  ]limited lle  Sensation: intact to light touch [ x  ] Abnormal [   ]    FUNCTIONAL STATUS:  Bed Mobility: Independent [   ]  Supervision [   ]  Needs Assistance [  x ]  N/A [   ]  Transfers: Independent [   ]  Supervision [   ]  Needs Assistance [   ]  N/A [   ]   Ambulation: Independent [   ]  Supervision [   ]  Needs Assistance [   ]  N/A [   ]  ADL: Independent [   ] Requires Assistance [   ] N/A [   ]      LABS:                        8.8    9.54  )-----------( 477      ( 03 Jun 2021 06:46 )             29.5     06-03    136  |  100  |  17  ----------------------------<  226<H>  5.0   |  26  |  0.8    Ca    8.4<L>      03 Jun 2021 06:46  Phos  3.2     06-02  Mg     1.8     06-02    TPro  6.7  /  Alb  2.7<L>  /  TBili  0.6  /  DBili  x   /  AST  30  /  ALT  27  /  AlkPhos  86  06-03          RADIOLOGY & ADDITIONAL STUDIES:

## 2021-06-03 NOTE — CONSULT NOTE ADULT - ASSESSMENT
IMPRESSION: Rehab of debilitation / left septic knee with very limited therapy tolerance    PRECAUTIONS: [   ] Cardiac  [   ] Respiratory  [   ] Seizures [   ] Contact Isolation  [   ] Droplet Isolation  [   ] Other    Weight Bearing Status:     RECOMMENDATION:    Out of Bed to Chair     DVT/Decubiti Prophylaxis    REHAB PLAN:     [  x  ] Bedside P/T 3-5 times a week   [  x  ]   Bedside O/T  2-3 times a week             [    ] Speech Therapy               [    ]  No Rehab Therapy Indicated   Conditioning/ROM                                    ADL  Bed Mobility                                               Conditioning/ROM  Transfers                                                     Bed Mobility  Sitting /Standing Balance                         Transfers                                        Gait Training                                               Sitting/Standing Balance  Stair Training [   ]Applicable                    Home equipment Eval                                                                        Splinting  [   ] Only      GOALS:   ADL   [  x  ]   Independent                    Transfers  [  x  ] Independent                          Ambulation  [  x  ] Independent     [  x   ] With device                            [    ]  CG                                                         [    ]  CG                                                                  [    ] CG                            [    ] Min A                                                   [    ] Min A                                                              [    ] Min  A          DISCHARGE PLAN:   [    ]  Good candidate for Intensive Rehabilitation/Hospital based                                             Will tolerate 3hrs Intensive Rehab Daily                                       [ x    ]  Short Term Rehab in Skilled Nursing Facility                                       [     ]  Home with Outpatient or  services                                         [     ]  Possible Candidate for Intensive Hospital based Rehab

## 2021-06-03 NOTE — CONSULT NOTE ADULT - CONSULT REQUESTED BY NAME
Medicine/ Surgery
Johnny
medicine
team
ed
Richard Díaz
Medical team
Medical team
Orthopedic Surgery
Johnny
Medicine
primary team
Dr Travis
Primary service

## 2021-06-03 NOTE — PROGRESS NOTE ADULT - ASSESSMENT
Patient is a 64 y/o male with PMHx of CAD s/p MI, PCI/CABG, CHFrEF (15-20%), has ICD, HTN, celiac disease ( Told by prior GI - not adherent to diet as he felt no imprvement) , DM, neuropathy, chronic b/l LE ulcers presents, severe chronic pain,  hx infected R TKR with MRSA (was eventually cemented so has limited ROM R knee), and history of cholecystostomy tube placement in February 2020 for acute cholecystitis s/p removal in May 2020 with multiple presentations including persistent bilious drainage from the prior tract site as well as a RUQ abdominal abscess at the site s/p drainage. He presented to ED with inability to walk/ambulate 2/2 left knee pain. Patient was treated for Septic joint and has had Orthopedic interventions. [facility] recalled regarding fistula. Patient tolerating full diet and notes significant improvement in Fistula output. No plan or need for Endoscopic closure at this time given improvement. Patient also with improvement in weight    Percutaneous Fistula  s/p percutaneous cholecystostomy tube  - Admitted for Septic joint  - On Effient, asa, Lovenox  - Site appears better since initial consult  - No plan or need for Endoscopic closure at this time   - Patient can follow up as outpatient if needed

## 2021-06-03 NOTE — CONSULT NOTE ADULT - CONSULT REQUESTED DATE/TIME
01-Jun-2021 17:05
19-May-2021 09:48
26-Apr-2021 01:25
26-Apr-2021 13:21
11-May-2021 12:09
28-Apr-2021 13:35
28-May-2021 16:05
29-Apr-2021 09:09
03-May-2021 16:41
24-May-2021 18:51
26-Apr-2021 10:17
03-Jun-2021 15:23
26-Apr-2021 15:24
27-Apr-2021 13:45

## 2021-06-03 NOTE — PROGRESS NOTE ADULT - ASSESSMENT
64 yo male admitted with left septic knee and severe PVD    # Left Knee Septic Arthritis with MSSA bacteremia   - repeat Blood Cx negative   - S/P left knee wash out POD #7 by ortho  - Ortho following; recs appreciated          Left knee lateral drain removed (6/1), Medial knee drain and occlusive dressing removed on 6/3  - ID following: -  will need 6 weeks from time of last debridement (5/27-7/7) -- to finish with cefazolin 2g q 8 hours  - PICC line ordered but patient refusing until he feels he is ready to be discharged   - PT following; STR vs home w/ PT vs acute rehab   - Physiatry reconsult for acute rehab reccs     #Biliary Drainage from previous perc sudhir site- resolved   - Previous Intraabdominal Cx 9/4/2020 -- VRE faecium and pseudomonas aeruginosa  - Wound Cx 5/1 growing Pseudomonas/VRE Faecium  - CT Abdomen and Pelvis w/ IV Cont (04.26.21 @ 04:42): No evidence of acute intra-abdominal pathology. Decreased area of right upper quadrant subcutaneous stranding at site of prior percutaneous cholecystostomy, without evidence of abscess  - HIDA scan unable to visualize contrast in GB   - spoke w/ Dr. Tello 5/3 after HIDA who recommended outpatient ERCP and cholecystectomy to divert bile and heal fistula  - At this time RUQ tract appears to be closed and well healed w/o active drainage   - LFT's unremarkable   - Reconsult for Dr. Tello regarding plan for ? 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  - No further intervention per Ortho     # PVD  - Arterial doppler 5/29 of bilateral LE show normal arterial flow in the bilateral lower extremities. Limited exam due to bandages  - Vascular Cardiology team following, would prefer test to be done when bandages are removed ( removed on 6/3 )   Repeat Arterial US pending   c/w ASA and Effient and high intensity statin   c/w GDMT for HFrEF     # DM2 c/b hyperglycemia   - increase Lantus to 25u qam   - cont lispro 3 tid w/ meals   - SSI      #Microcytic Anemia   - Iron  def combined with ACD    #CAD s/p PCI to RCA and OM1, s/p LIMA to LAD (2018)   #Severe ischemic cardiomyopathy w/ reduced EF s/p CRT-D   #HTN   #DLD   - JOHN PAUL 5/2021 w/ ef 20-25%  c/w ASA and Effient   Resume high intensity statin  resume GDMT( Toprol, Aldactone, Digoxn, Lasix prn )  for HFrEF     # Covid vaccine status:   He has received one dose and is supposed to receive the second dose on 5/15/21 but he was already hospitalized. ID on board and will appreciate their input on that.      # DVT ppx: Lovenox      #Progress Note Handoff:   Pending (specify):  PICC line, LE Arterial Duplex , Physiatry and [facility] re-eval   Family discussion: d/w patient at length . Patient is refusing picc line placement until he is ready for discharge. He wants to speak w/ physiatry about potential acute rehab placement, and he would like to speak with Dr. Tello regarding potential inpt cholecystectomy   Disposition: Home vs SNF vs Acute rehab       Krysten Ohara DO

## 2021-06-03 NOTE — PROGRESS NOTE ADULT - SUBJECTIVE AND OBJECTIVE BOX
Patient is a 64 y/o male with PMHx of CAD s/p MI, PCI/CABG, CHFrEF (15-20%), has ICD, HTN, celiac disease ( Told by prior GI - not adherent to diet as he felt no imprvement) , DM, neuropathy, chronic b/l LE ulcers presents, severe chronic pain,  hx infected R TKR with MRSA (was eventually cemented so has limited ROM R knee), and history of cholecystostomy tube placement in February 2020 for acute cholecystitis s/p removal in May 2020 with multiple presentations including persistent bilious drainage from the prior tract site as well as a RUQ abdominal abscess at the site s/p drainage. He presented to ED with inability to walk/ambulate 2/2 left knee pain. Patient was treated for Septic joint and has had Orthopedic interventions. [facility] recalled regarding fistula. Patient tolerating full diet and notes significant improvement in Fistula output.       PAST MEDICAL & SURGICAL HISTORY:  Status post percutaneous transluminal coronary angioplasty  Atherosclerosis of coronary artery  Osteoarthritis  HLD (hyperlipidemia)  Blood clot due to device, implant, or graft  Diabetes on insulin pump  HTN (hypertension)  CHF (congestive heart failure)-EF ~ 25%  History of celiac disease  Diverticulitis  STEMI (ST elevation myocardial infarction)  Diabetic neuropathy  Anxiety and depression  Fixation hardware in foot LEFT  S/P TKR (total knee replacement), right  H/O shoulder surgery-right  AICD (automatic cardioverter/defibrillator) present  Cholecystostomy   History of tonsillectomy        MEDICATIONS  (STANDING):  aspirin enteric coated 81 milliGRAM(s) Oral daily  atorvastatin 80 milliGRAM(s) Oral at bedtime  chlorhexidine 4% Liquid 1 Application(s) Topical <User Schedule>  digoxin     Tablet 125 MICROGram(s) Oral daily  DULoxetine 90 milliGRAM(s) Oral daily  enoxaparin Injectable 40 milliGRAM(s) SubCutaneous daily  insulin aspart (NovoLOG) Pump - Peds 1 Each SubCutaneous Continuous Pump  metoprolol succinate ER 25 milliGRAM(s) Oral daily  nafcillin  IVPB 2 Gram(s) IV Intermittent once  nafcillin  IVPB 2 Gram(s) IV Intermittent every 4 hours  nafcillin  IVPB      pantoprazole    Tablet 40 milliGRAM(s) Oral before breakfast  prasugrel 10 milliGRAM(s) Oral daily  spironolactone 25 milliGRAM(s) Oral daily    MEDICATIONS  (PRN):  acetaminophen   Tablet .. 650 milliGRAM(s) Oral every 6 hours PRN Temp greater or equal to 38C (100.4F), Mild Pain (1 - 3)  ALPRAZolam 0.5 milliGRAM(s) Oral every 8 hours PRN anxiety  furosemide    Tablet 40 milliGRAM(s) Oral daily PRN fluid overload  melatonin 10 milliGRAM(s) Oral at bedtime PRN Insomnia  morphine  - Injectable 4 milliGRAM(s) IV Push every 4 hours PRN Severe Pain (7 - 10)  oxycodone    5 mG/acetaminophen 325 mG 1 Tablet(s) Oral every 6 hours PRN Moderate Pain (4 - 6)      Allergies  ACE inhibitors (Hives)  carvedilol (Other)  enalapril (Hives)  Entresto (Other)      Review of Systems  General:  See HPI  HEENT: Denies Trouble Swallowing ,Denies  Sore Throat , Denies Change in hearing/vision/speech ,Denies Dizziness    Cardio: Denies  Chest Pain , Palpitations    Respiratory: Denies worsening of SOB, Denies Cough  Abdomen: See detailed HPI  Neuro: Denies Headache Denies Dizziness, Denies Paresthesias  MSK: See HPI  Psych: Patient denies depression, denies suicidal or homicidal ideations  Integ: Patient Denies rash, or new skin lesions         Vital Signs   T(F): 99 (03 Jun 2021 12:52), Max: 99 (03 Jun 2021 12:52)  HR: 79 (03 Jun 2021 12:52) (79 - 97)  BP: 117/58 (03 Jun 2021 12:52) (117/58 - 133/74)  RR: 18 (03 Jun 2021 12:52) (18 - 18)  SpO2: 99% (02 Jun 2021 20:11) (99% - 99%)  Physical Exam  Gen: NAD, comfortable   HEENT: NC/AT  Cardio: S1/S2 No S3/S4, Regular  Resp: CTA B/L  Abdomen: Soft, ND/NT, Insulin pump noted, Fistula noted in RUQ appears much improved, scant discharge on dressing  Neuro: AAOx3  Extremities: FROM x 4        LABS:                        8.8    9.54  )-----------( 477      ( 03 Jun 2021 06:46 )             29.5     06-03    136  |  100  |  17  ----------------------------<  226<H>  5.0   |  26  |  0.8    Ca    8.4<L>      03 Jun 2021 06:46  Phos  3.2     06-02  Mg     1.8     06-02    TPro  6.7  /  Alb  2.7<L>  /  TBili  0.6  /  DBili  x   /  AST  30  /  ALT  27  /  AlkPhos  86  06-03        RADIOLOGY & ADDITIONAL STUDIES:  CT Abdomen and Pelvis w/ IV Cont 04.26.21   IMPRESSION:  Since February 5, 2021:    1. No evidence of acute intra-abdominal pathology.    2. Decreased area of right upper quadrant subcutaneous stranding at site of prior percutaneous cholecystostomy, without evidence of abscess.

## 2021-06-03 NOTE — CONSULT NOTE ADULT - NSICDXPILOT_GEN_ALL_CORE
[place]

## 2021-06-03 NOTE — CONSULT NOTE ADULT - PROVIDER SPECIALTY LIST ADULT
Endocrinology
Surgery
Vascular Cardiology
Endocrinology
Infectious Disease
Burn
Gastroenterology
Intervent Radiology
Physiatry
Orthopedics
Pain Medicine
Cardiology
Pain Medicine
Pain Medicine

## 2021-06-03 NOTE — PROGRESS NOTE ADULT - SUBJECTIVE AND OBJECTIVE BOX
LOIDAFLOWER  65y, Male  Allergy: ACE inhibitors (Hives)  carvedilol (Other)  enalapril (Hives)  Entresto (Other)    Hospital Day: 38d    Patient seen and examined earlier today. Patient is hesitant regarding picc line since he does not want to be " rushed out of the hospital ". He is uncertain about going to a rehab facility, since he has had a bad experience in the past. Upon further discussion w/ CM and SW he would like to be re-evaluated by Physiatry to see if he is a candidate for Acute Rehab. He also wants [facility] to see him again regarding having a cholecystectomy during this admission.     PMH/PSH:  PAST MEDICAL & SURGICAL HISTORY:  Status post percutaneous transluminal coronary angioplasty  2 stents    Atherosclerosis of coronary artery  CAD (coronary artery disease)    Osteoarthritis    HLD (hyperlipidemia)    Blood clot due to device, implant, or graft  was on blood thinners    Diabetes  on insulin pump    HTN (hypertension)    CHF (congestive heart failure)  EF ~ 25%    History of celiac disease    Diverticulitis    STEMI (ST elevation myocardial infarction)    Diabetic neuropathy    Anxiety and depression    Other postprocedural status  Fixation hardware in foot LEFT    Stented coronary artery  10/18 heart attack  INFERIOR WALL MI    S/P CABG x 1  2018    S/P TKR (total knee replacement), right  with infection Mrsa   per pt he was cleared from MRSA infection    Surgery, elective  right knee wound debridement    History of open reduction and internal fixation (ORIF) procedure    H/O shoulder surgery  right    AICD (automatic cardioverter/defibrillator) present    Cholecystostomy care  drain inserted 2020 &amp; removed 4 months ago    History of tonsillectomy    History of hip replacement, total, right        LAST 24-Hr EVENTS:    VITALS:  T(F): 99 (06-03-21 @ 12:52), Max: 99 (06-03-21 @ 12:52)  HR: 79 (06-03-21 @ 12:52)  BP: 117/58 (06-03-21 @ 12:52) (117/58 - 133/74)  RR: 18 (06-03-21 @ 12:52)  SpO2: 99% (06-02-21 @ 20:11)        TESTS & MEASUREMENTS:  Weight (Kg):       06-01-21 @ 07:01  -  06-02-21 @ 07:00  --------------------------------------------------------  IN: 0 mL / OUT: 1335 mL / NET: -1335 mL    06-02-21 @ 07:01  -  06-03-21 @ 07:00  --------------------------------------------------------  IN: 0 mL / OUT: 600 mL / NET: -600 mL    06-03-21 @ 07:01  -  06-03-21 @ 13:32  --------------------------------------------------------  IN: 0 mL / OUT: 600 mL / NET: -600 mL                            8.8    9.54  )-----------( 477      ( 03 Jun 2021 06:46 )             29.5       06-03    136  |  100  |  17  ----------------------------<  226<H>  5.0   |  26  |  0.8    Ca    8.4<L>      03 Jun 2021 06:46  Phos  3.2     06-02  Mg     1.8     06-02    TPro  6.7  /  Alb  2.7<L>  /  TBili  0.6  /  DBili  x   /  AST  30  /  ALT  27  /  AlkPhos  86  06-03    LIVER FUNCTIONS - ( 03 Jun 2021 06:46 )  Alb: 2.7 g/dL / Pro: 6.7 g/dL / ALK PHOS: 86 U/L / ALT: 27 U/L / AST: 30 U/L / GGT: x                               RADIOLOGY, ECG, & ADDITIONAL TESTS:      RECENT DIAGNOSTIC ORDERS:  VA Duplex Lower Extrem Arterial, Bilat: Urgent   Indication: poor vasculopathy  [facility] (06-03-21 @ 13:17)  Diet, Soft:   Consistent Carbohydrate Evening Snack  DASH/TLC Sodium & Cholesterol Restricted  Low Fat (LOWFAT) (06-02-21 @ 16:53)      MEDICATIONS:  MEDICATIONS  (STANDING):  aspirin  chewable 81 milliGRAM(s) Oral daily  atorvastatin 80 milliGRAM(s) Oral at bedtime  dextrose 40% Gel 15 Gram(s) Oral once  dextrose 5%. 1000 milliLiter(s) (50 mL/Hr) IV Continuous <Continuous>  dextrose 5%. 1000 milliLiter(s) (100 mL/Hr) IV Continuous <Continuous>  dextrose 50% Injectable 25 Gram(s) IV Push once  dextrose 50% Injectable 12.5 Gram(s) IV Push once  dextrose 50% Injectable 25 Gram(s) IV Push once  digoxin     Tablet 125 MICROGram(s) Oral daily  DULoxetine 90 milliGRAM(s) Oral daily  enoxaparin Injectable 40 milliGRAM(s) SubCutaneous daily  gabapentin 600 milliGRAM(s) Oral three times a day  glucagon  Injectable 1 milliGRAM(s) IntraMuscular once  insulin glargine Injectable (LANTUS) 25 Unit(s) SubCutaneous every morning  insulin lispro (ADMELOG) corrective regimen sliding scale   SubCutaneous three times a day before meals  insulin lispro Injectable (ADMELOG) 3 Unit(s) SubCutaneous three times a day before meals  metoprolol succinate ER 25 milliGRAM(s) Oral daily  nafcillin  IVPB 2 Gram(s) IV Intermittent every 4 hours  prasugrel 10 milliGRAM(s) Oral daily  spironolactone 25 milliGRAM(s) Oral daily    MEDICATIONS  (PRN):  acetaminophen   Tablet .. 650 milliGRAM(s) Oral every 8 hours PRN Temp greater or equal to 38C (100.4F), Moderate Pain (4 - 6)  ALPRAZolam 0.5 milliGRAM(s) Oral every 8 hours PRN anxiety  cyclobenzaprine 5 milliGRAM(s) Oral two times a day PRN Muscle Spasm  furosemide    Tablet 40 milliGRAM(s) Oral daily PRN fluid overload  morphine  - Injectable 2 milliGRAM(s) IV Push every 4 hours PRN Moderate Pain (4 - 6)  oxyCODONE    IR 5 milliGRAM(s) Oral every 4 hours PRN Severe Pain (7 - 10)      HOME MEDICATIONS:  ALPRAZolam 0.5 mg oral tablet (04-26)  aspirin 81 mg oral delayed release tablet (04-26)  digoxin 125 mcg (0.125 mg) oral tablet (04-26)  DULoxetine (04-26)  insulin (04-26)  Jardiance 10 mg oral tablet (04-26)  melatonin 10 mg oral tablet (04-26)  metoprolol succinate 25 mg oral tablet, extended release (04-26)  morphine 15 mg oral tablet (04-26)  prasugrel 10 mg oral tablet (04-26)  spironolactone 25 mg oral tablet (04-26)      PHYSICAL EXAM:  GENERAL: NAD, well-groomed, well-developed  EYE: anicteric sclera, non injected conjunctiva, EOMI  ENT: hearing grossly intact, oropharynx clear  PSYCH: no agitation, baseline mentation  NERVOUS SYSTEM:  Alert & Oriented X3, moving all extremities normally   PULMONARY: Clear to auscultation bilaterally; No rales, rhonchi, wheezing, or rubs  CARDIOVASCULAR: Regular rate and rhythm; No murmurs, rubs, or gallops  GI: Soft, Nontender, Nondistended; Bowel sounds present  EXTREMITIES:  2+ Peripheral Pulses, No clubbing, cyanosis, or edema  LYMPH: No lymphadenopathy noted  SKIN: No rashes or lesions

## 2021-06-03 NOTE — CONSULT NOTE ADULT - CONSULT REASON
Knee pain/spasms
left knee effusion
Insulin pump malfunction
fistulogram
Fistula
Right knee wound
Management of left knee pain
CHF
Leak from prior cholecystostomy site
PAD
Septic knee, severe pain
diabetes with missing pump
Septic arthritis
septic arthritis

## 2021-06-04 LAB
ANION GAP SERPL CALC-SCNC: 8 MMOL/L — SIGNIFICANT CHANGE UP (ref 7–14)
BUN SERPL-MCNC: 17 MG/DL — SIGNIFICANT CHANGE UP (ref 10–20)
CALCIUM SERPL-MCNC: 8.5 MG/DL — SIGNIFICANT CHANGE UP (ref 8.5–10.1)
CHLORIDE SERPL-SCNC: 101 MMOL/L — SIGNIFICANT CHANGE UP (ref 98–110)
CO2 SERPL-SCNC: 27 MMOL/L — SIGNIFICANT CHANGE UP (ref 17–32)
CREAT SERPL-MCNC: 1 MG/DL — SIGNIFICANT CHANGE UP (ref 0.7–1.5)
GLUCOSE BLDC GLUCOMTR-MCNC: 162 MG/DL — HIGH (ref 70–99)
GLUCOSE BLDC GLUCOMTR-MCNC: 166 MG/DL — HIGH (ref 70–99)
GLUCOSE BLDC GLUCOMTR-MCNC: 174 MG/DL — HIGH (ref 70–99)
GLUCOSE SERPL-MCNC: 173 MG/DL — HIGH (ref 70–99)
HCT VFR BLD CALC: 30 % — LOW (ref 42–52)
HGB BLD-MCNC: 8.8 G/DL — LOW (ref 14–18)
MCHC RBC-ENTMCNC: 22.3 PG — LOW (ref 27–31)
MCHC RBC-ENTMCNC: 29.3 G/DL — LOW (ref 32–37)
MCV RBC AUTO: 75.9 FL — LOW (ref 80–94)
NRBC # BLD: 0 /100 WBCS — SIGNIFICANT CHANGE UP (ref 0–0)
PLATELET # BLD AUTO: 507 K/UL — HIGH (ref 130–400)
POTASSIUM SERPL-MCNC: 5.3 MMOL/L — HIGH (ref 3.5–5)
POTASSIUM SERPL-SCNC: 5.3 MMOL/L — HIGH (ref 3.5–5)
RBC # BLD: 3.95 M/UL — LOW (ref 4.7–6.1)
RBC # FLD: 25.6 % — HIGH (ref 11.5–14.5)
SARS-COV-2 RNA SPEC QL NAA+PROBE: SIGNIFICANT CHANGE UP
SODIUM SERPL-SCNC: 136 MMOL/L — SIGNIFICANT CHANGE UP (ref 135–146)
WBC # BLD: 9.44 K/UL — SIGNIFICANT CHANGE UP (ref 4.8–10.8)
WBC # FLD AUTO: 9.44 K/UL — SIGNIFICANT CHANGE UP (ref 4.8–10.8)

## 2021-06-04 PROCEDURE — 36573 INSJ PICC RS&I 5 YR+: CPT

## 2021-06-04 PROCEDURE — 99233 SBSQ HOSP IP/OBS HIGH 50: CPT

## 2021-06-04 RX ORDER — OXYCODONE HYDROCHLORIDE 5 MG/1
10 TABLET ORAL EVERY 12 HOURS
Refills: 0 | Status: DISCONTINUED | OUTPATIENT
Start: 2021-06-04 | End: 2021-06-05

## 2021-06-04 RX ORDER — MORPHINE SULFATE 50 MG/1
1 CAPSULE, EXTENDED RELEASE ORAL ONCE
Refills: 0 | Status: DISCONTINUED | OUTPATIENT
Start: 2021-06-04 | End: 2021-06-04

## 2021-06-04 RX ADMIN — DULOXETINE HYDROCHLORIDE 90 MILLIGRAM(S): 30 CAPSULE, DELAYED RELEASE ORAL at 11:08

## 2021-06-04 RX ADMIN — NAFCILLIN 200 GRAM(S): 10 INJECTION, POWDER, FOR SOLUTION INTRAVENOUS at 01:35

## 2021-06-04 RX ADMIN — CYCLOBENZAPRINE HYDROCHLORIDE 5 MILLIGRAM(S): 10 TABLET, FILM COATED ORAL at 17:08

## 2021-06-04 RX ADMIN — OXYCODONE HYDROCHLORIDE 10 MILLIGRAM(S): 5 TABLET ORAL at 06:39

## 2021-06-04 RX ADMIN — NAFCILLIN 200 GRAM(S): 10 INJECTION, POWDER, FOR SOLUTION INTRAVENOUS at 17:17

## 2021-06-04 RX ADMIN — Medication 125 MICROGRAM(S): at 05:42

## 2021-06-04 RX ADMIN — NAFCILLIN 200 GRAM(S): 10 INJECTION, POWDER, FOR SOLUTION INTRAVENOUS at 10:47

## 2021-06-04 RX ADMIN — GABAPENTIN 600 MILLIGRAM(S): 400 CAPSULE ORAL at 13:45

## 2021-06-04 RX ADMIN — Medication 2: at 17:11

## 2021-06-04 RX ADMIN — Medication 3 UNIT(S): at 17:11

## 2021-06-04 RX ADMIN — GABAPENTIN 600 MILLIGRAM(S): 400 CAPSULE ORAL at 21:20

## 2021-06-04 RX ADMIN — MORPHINE SULFATE 2 MILLIGRAM(S): 50 CAPSULE, EXTENDED RELEASE ORAL at 11:43

## 2021-06-04 RX ADMIN — MORPHINE SULFATE 2 MILLIGRAM(S): 50 CAPSULE, EXTENDED RELEASE ORAL at 01:41

## 2021-06-04 RX ADMIN — MORPHINE SULFATE 2 MILLIGRAM(S): 50 CAPSULE, EXTENDED RELEASE ORAL at 01:56

## 2021-06-04 RX ADMIN — Medication 81 MILLIGRAM(S): at 11:08

## 2021-06-04 RX ADMIN — ENOXAPARIN SODIUM 40 MILLIGRAM(S): 100 INJECTION SUBCUTANEOUS at 11:07

## 2021-06-04 RX ADMIN — Medication 2: at 07:39

## 2021-06-04 RX ADMIN — PRASUGREL 10 MILLIGRAM(S): 5 TABLET, FILM COATED ORAL at 11:08

## 2021-06-04 RX ADMIN — SPIRONOLACTONE 25 MILLIGRAM(S): 25 TABLET, FILM COATED ORAL at 05:42

## 2021-06-04 RX ADMIN — Medication 3 UNIT(S): at 07:40

## 2021-06-04 RX ADMIN — MORPHINE SULFATE 2 MILLIGRAM(S): 50 CAPSULE, EXTENDED RELEASE ORAL at 14:58

## 2021-06-04 RX ADMIN — OXYCODONE HYDROCHLORIDE 10 MILLIGRAM(S): 5 TABLET ORAL at 06:09

## 2021-06-04 RX ADMIN — OXYCODONE HYDROCHLORIDE 10 MILLIGRAM(S): 5 TABLET ORAL at 18:29

## 2021-06-04 RX ADMIN — NAFCILLIN 200 GRAM(S): 10 INJECTION, POWDER, FOR SOLUTION INTRAVENOUS at 13:44

## 2021-06-04 RX ADMIN — NAFCILLIN 200 GRAM(S): 10 INJECTION, POWDER, FOR SOLUTION INTRAVENOUS at 05:42

## 2021-06-04 RX ADMIN — Medication 25 MILLIGRAM(S): at 05:42

## 2021-06-04 RX ADMIN — Medication 3 UNIT(S): at 11:42

## 2021-06-04 RX ADMIN — Medication 2: at 11:42

## 2021-06-04 RX ADMIN — MORPHINE SULFATE 2 MILLIGRAM(S): 50 CAPSULE, EXTENDED RELEASE ORAL at 21:23

## 2021-06-04 RX ADMIN — NAFCILLIN 200 GRAM(S): 10 INJECTION, POWDER, FOR SOLUTION INTRAVENOUS at 21:24

## 2021-06-04 RX ADMIN — ATORVASTATIN CALCIUM 80 MILLIGRAM(S): 80 TABLET, FILM COATED ORAL at 21:20

## 2021-06-04 RX ADMIN — MORPHINE SULFATE 1 MILLIGRAM(S): 50 CAPSULE, EXTENDED RELEASE ORAL at 17:17

## 2021-06-04 RX ADMIN — GABAPENTIN 600 MILLIGRAM(S): 400 CAPSULE ORAL at 05:42

## 2021-06-04 RX ADMIN — MORPHINE SULFATE 2 MILLIGRAM(S): 50 CAPSULE, EXTENDED RELEASE ORAL at 11:07

## 2021-06-04 RX ADMIN — INSULIN GLARGINE 25 UNIT(S): 100 INJECTION, SOLUTION SUBCUTANEOUS at 07:40

## 2021-06-04 NOTE — PROCEDURE NOTE - NSPOSTPRCRAD_GEN_A_CORE
chest radiograph/depth of insertion/fluoroscopy/line adjusted to depth of insertion/line in appropriate postion

## 2021-06-04 NOTE — PROCEDURE NOTE - NSPROCDETAILS_GEN_ALL_CORE
location identified, draped/prepped, sterile technique used/sterile dressing applied/sterile technique, catheter placed/supine position/ultrasound guidance
location identified, draped/prepped, sterile technique used/sterile dressing applied/sterile technique, catheter placed/ultrasound guidance

## 2021-06-04 NOTE — PROCEDURE NOTE - NSINFORMCONSENT_GEN_A_CORE
Benefits, risks, and possible complications of procedure explained to patient/caregiver who verbalized understanding and gave verbal consent.
patient/Benefits, risks, and possible complications of procedure explained to patient/caregiver who verbalized understanding and gave written consent.

## 2021-06-04 NOTE — PROGRESS NOTE ADULT - SUBJECTIVE AND OBJECTIVE BOX
SUBJ: Patient seen and examined. No events overnight.       MEDICATIONS  (STANDING):  aspirin  chewable 81 milliGRAM(s) Oral daily  atorvastatin 80 milliGRAM(s) Oral at bedtime  dextrose 40% Gel 15 Gram(s) Oral once  dextrose 5%. 1000 milliLiter(s) (50 mL/Hr) IV Continuous <Continuous>  dextrose 5%. 1000 milliLiter(s) (100 mL/Hr) IV Continuous <Continuous>  dextrose 50% Injectable 25 Gram(s) IV Push once  dextrose 50% Injectable 12.5 Gram(s) IV Push once  dextrose 50% Injectable 25 Gram(s) IV Push once  digoxin     Tablet 125 MICROGram(s) Oral daily  DULoxetine 90 milliGRAM(s) Oral daily  enoxaparin Injectable 40 milliGRAM(s) SubCutaneous daily  gabapentin 600 milliGRAM(s) Oral three times a day  glucagon  Injectable 1 milliGRAM(s) IntraMuscular once  insulin glargine Injectable (LANTUS) 25 Unit(s) SubCutaneous every morning  insulin lispro (ADMELOG) corrective regimen sliding scale   SubCutaneous three times a day before meals  insulin lispro Injectable (ADMELOG) 3 Unit(s) SubCutaneous three times a day before meals  metoprolol succinate ER 25 milliGRAM(s) Oral daily  nafcillin  IVPB 2 Gram(s) IV Intermittent every 4 hours  oxyCODONE  ER Tablet 10 milliGRAM(s) Oral every 12 hours  prasugrel 10 milliGRAM(s) Oral daily  spironolactone 25 milliGRAM(s) Oral daily    MEDICATIONS  (PRN):  acetaminophen   Tablet .. 650 milliGRAM(s) Oral every 8 hours PRN Temp greater or equal to 38C (100.4F), Moderate Pain (4 - 6)  ALPRAZolam 0.5 milliGRAM(s) Oral every 8 hours PRN anxiety  cyclobenzaprine 5 milliGRAM(s) Oral two times a day PRN Muscle Spasm  furosemide    Tablet 40 milliGRAM(s) Oral daily PRN fluid overload  morphine  - Injectable 2 milliGRAM(s) IV Push every 4 hours PRN Moderate Pain (4 - 6)            Vital Signs Last 24 Hrs  T(C): 36.2 (04 Jun 2021 05:44), Max: 37.2 (03 Jun 2021 12:52)  T(F): 97.1 (04 Jun 2021 05:44), Max: 99 (03 Jun 2021 12:52)  HR: 87 (03 Jun 2021 20:55) (79 - 96)  BP: 114/70 (04 Jun 2021 05:44) (114/70 - 128/63)  BP(mean): --  RR: 18 (04 Jun 2021 05:44) (18 - 18)  SpO2: 96% (04 Jun 2021 05:44) (96% - 98%)     REVIEW OF SYSTEMS:  CONSTITUTIONAL: No fever  NECK: No pain or stiffness  CARDIOVASCULAR: patient denies chest pain, shortness of breath or palpitations .  Repiratory: No cough or wheezing.  NEUROLOGICAL: No focal deficits to report.  GI: no BRBPR, no N,V,diarrhea.        PHYSICAL EXAM:  GENERAL: NAD  HEAD:  Atraumatic, Normocephalic  NECK: Supple, No JVD  NERVOUS SYSTEM:  Alert & Oriented X3, Good concentration  CHEST/LUNG: Clear to anterior auscultation bilaterally  HEART: Regular rate and rhythm  ABDOMEN: Soft, Nontender, Nondistended;   EXTREMITIES:  No  edema      LABS:                        8.8    9.44  )-----------( 507      ( 04 Jun 2021 06:58 )             30.0     06-04    136  |  101  |  17  ----------------------------<  173<H>  5.3<H>   |  27  |  1.0    Ca    8.5      04 Jun 2021 06:58    TPro  6.7  /  Alb  2.7<L>  /  TBili  0.6  /  DBili  x   /  AST  30  /  ALT  27  /  AlkPhos  86  06-03            I&O's Summary    03 Jun 2021 07:01  -  04 Jun 2021 07:00  --------------------------------------------------------  IN: 660 mL / OUT: 1100 mL / NET: -440 mL      BNP  RADIOLOGY & ADDITIONAL STUDIES:    IMPRESSION AND PLAN:    CAD s/p PCI to RCA and OM1, s/p LIMA to LAD (2018)   Severe ischemic cardiomyopathy (EF 15-20%) s/p CRT-D - currently compensated - not fluid overloaded  DMII, HTN, DL  Currently admitted with septic arthritis of the knee s/p drainage, irrigation and debridement   r/o PAD : bilateral LE arterial duplex reviewed and showed adequate perfusion, no need for further work up   -  Management as per primary team   - c/w ASA, Effient and Atorvastatin  - c/w GDMT for HFrEF

## 2021-06-04 NOTE — PROCEDURE NOTE - ADDITIONAL PROCEDURE DETAILS
10cm bard midline in right basilic vein. Power injectable. Ready to use.
Bard PowerPICC used  Tip in SVC, 45cm, ok to use   **pre existing midline exchanged for new 5Fr single lumen PICC line**

## 2021-06-04 NOTE — PROCEDURE NOTE - NSPOSTCAREGUIDE_GEN_A_CORE
Verbal/written post procedure instructions were given to patient/caregiver/Instructed patient/caregiver to follow-up with primary care physician/Instructed patient/caregiver regarding signs and symptoms of infection/Keep the cast/splint/dressing clean and dry/Care for catheter as per unit/ICU protocols
Verbal/written post procedure instructions were given to patient/caregiver/Instructed patient/caregiver to follow-up with primary care physician/Instructed patient/caregiver regarding signs and symptoms of infection

## 2021-06-04 NOTE — PROGRESS NOTE ADULT - ASSESSMENT
66 yo male admitted with left septic knee and severe PVD    # Left Knee Septic Arthritis with MSSA bacteremia   - repeat Blood Cx negative   - S/P left knee wash out by ortho  - Ortho following; recs appreciated          Left knee lateral drain removed (6/1), Medial knee drain and occlusive dressing removed on 6/3  - ID following: -  will need 6 weeks from time of last debridement (5/27-7/7) -- to finish with cefazolin 2g q 8 hours  - PICC line ordered and will be completed today   - PT following; STR     #Biliary Drainage from previous perc sudhir site- resolved   - Previous Intraabdominal Cx 9/4/2020 -- VRE faecium and pseudomonas aeruginosa  - Wound Cx 5/1 growing Pseudomonas/VRE Faecium  - CT Abdomen and Pelvis w/ IV Cont (04.26.21 @ 04:42): No evidence of acute intra-abdominal pathology. Decreased area of right upper quadrant subcutaneous stranding at site of prior percutaneous cholecystostomy, without evidence of abscess  - HIDA scan unable to visualize contrast in GB   - spoke w/ Dr. Tello 5/3 after HIDA who recommended outpatient ERCP and cholecystectomy to divert bile and heal fistula  - At this time RUQ tract appears to be closed and well healed w/o active drainage   - LFT's unremarkable   - spoke w/ Dr. 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  - No further intervention per Ortho     # PVD  - Arterial doppler 5/29 of bilateral LE show normal arterial flow in the bilateral lower extremities. Limited exam due to bandages  - Vascular Cardiology team following, no further intervention   c/w ASA and Effient and high intensity statin   c/w GDMT for HFrEF     # DM2 c/b hyperglycemia   - increase Lantus to 25u qam   - cont lispro 3 tid w/ meals   - SSI      #Microcytic Anemia   - Iron  def combined with ACD    #CAD s/p PCI to RCA and OM1, s/p LIMA to LAD (2018)   #Severe ischemic cardiomyopathy w/ reduced EF s/p CRT-D   #HTN   #DLD   - JOHN PAUL 5/2021 w/ ef 20-25%  c/w ASA and Effient   Resume high intensity statin  resume GDMT( Toprol, Aldactone, Digoxn, Lasix prn )  for HFrEF     # Covid vaccine status:   He has received one dose and is supposed to receive the second dose on 5/15/21 but he was already hospitalized. ID on board and will appreciate their input on that.      # DVT ppx: Lovenox      #Progress Note Handoff:   Pending (specify):  PICC line then dc   Family discussion: d/w patient at length   Disposition: Home vs SNF vs Acute rehab       Krysten Ohara DO

## 2021-06-04 NOTE — PROGRESS NOTE ADULT - SUBJECTIVE AND OBJECTIVE BOX
FLOWER MARISCAL  65y, Male  Allergy: ACE inhibitors (Hives)  carvedilol (Other)  enalapril (Hives)  Entresto (Other)    Hospital Day: 39d    Patient seen and examined earlier today. Agreeable to STR, and PICC Line today after extensive discussion.    PMH/PSH:  PAST MEDICAL & SURGICAL HISTORY:  Status post percutaneous transluminal coronary angioplasty  2 stents    Atherosclerosis of coronary artery  CAD (coronary artery disease)    Osteoarthritis    HLD (hyperlipidemia)    Blood clot due to device, implant, or graft  was on blood thinners    Diabetes  on insulin pump    HTN (hypertension)    CHF (congestive heart failure)  EF ~ 25%    History of celiac disease    Diverticulitis    STEMI (ST elevation myocardial infarction)    Diabetic neuropathy    Anxiety and depression    Other postprocedural status  Fixation hardware in foot LEFT    Stented coronary artery  10/18 heart attack  INFERIOR WALL MI    S/P CABG x 1  2018    S/P TKR (total knee replacement), right  with infection Mrsa   per pt he was cleared from MRSA infection    Surgery, elective  right knee wound debridement    History of open reduction and internal fixation (ORIF) procedure    H/O shoulder surgery  right    AICD (automatic cardioverter/defibrillator) present    Cholecystostomy care  drain inserted 2020 &amp; removed 4 months ago    History of tonsillectomy    History of hip replacement, total, right        LAST 24-Hr EVENTS:    VITALS:  T(F): 98.7 (06-04-21 @ 13:30), Max: 98.7 (06-04-21 @ 13:30)  HR: 77 (06-04-21 @ 13:30)  BP: 102/64 (06-04-21 @ 13:30) (102/64 - 128/63)  RR: 18 (06-04-21 @ 13:30)  SpO2: 96% (06-04-21 @ 05:44)        TESTS & MEASUREMENTS:  Weight (Kg):       06-02-21 @ 07:01  -  06-03-21 @ 07:00  --------------------------------------------------------  IN: 0 mL / OUT: 600 mL / NET: -600 mL    06-03-21 @ 07:01  -  06-04-21 @ 07:00  --------------------------------------------------------  IN: 660 mL / OUT: 1100 mL / NET: -440 mL                            8.8    9.44  )-----------( 507      ( 04 Jun 2021 06:58 )             30.0       06-04    136  |  101  |  17  ----------------------------<  173<H>  5.3<H>   |  27  |  1.0    Ca    8.5      04 Jun 2021 06:58    TPro  6.7  /  Alb  2.7<L>  /  TBili  0.6  /  DBili  x   /  AST  30  /  ALT  27  /  AlkPhos  86  06-03    LIVER FUNCTIONS - ( 03 Jun 2021 06:46 )  Alb: 2.7 g/dL / Pro: 6.7 g/dL / ALK PHOS: 86 U/L / ALT: 27 U/L / AST: 30 U/L / GGT: x                           COVID-19 PCR: NotDetec (06-03-21 @ 18:18)      RADIOLOGY, ECG, & ADDITIONAL TESTS:      RECENT DIAGNOSTIC ORDERS:      MEDICATIONS:  MEDICATIONS  (STANDING):  aspirin  chewable 81 milliGRAM(s) Oral daily  atorvastatin 80 milliGRAM(s) Oral at bedtime  dextrose 40% Gel 15 Gram(s) Oral once  dextrose 5%. 1000 milliLiter(s) (50 mL/Hr) IV Continuous <Continuous>  dextrose 5%. 1000 milliLiter(s) (100 mL/Hr) IV Continuous <Continuous>  dextrose 50% Injectable 25 Gram(s) IV Push once  dextrose 50% Injectable 12.5 Gram(s) IV Push once  dextrose 50% Injectable 25 Gram(s) IV Push once  digoxin     Tablet 125 MICROGram(s) Oral daily  DULoxetine 90 milliGRAM(s) Oral daily  enoxaparin Injectable 40 milliGRAM(s) SubCutaneous daily  gabapentin 600 milliGRAM(s) Oral three times a day  glucagon  Injectable 1 milliGRAM(s) IntraMuscular once  insulin glargine Injectable (LANTUS) 25 Unit(s) SubCutaneous every morning  insulin lispro (ADMELOG) corrective regimen sliding scale   SubCutaneous three times a day before meals  insulin lispro Injectable (ADMELOG) 3 Unit(s) SubCutaneous three times a day before meals  metoprolol succinate ER 25 milliGRAM(s) Oral daily  nafcillin  IVPB 2 Gram(s) IV Intermittent every 4 hours  oxyCODONE  ER Tablet 10 milliGRAM(s) Oral every 12 hours  prasugrel 10 milliGRAM(s) Oral daily  spironolactone 25 milliGRAM(s) Oral daily    MEDICATIONS  (PRN):  acetaminophen   Tablet .. 650 milliGRAM(s) Oral every 8 hours PRN Temp greater or equal to 38C (100.4F), Moderate Pain (4 - 6)  ALPRAZolam 0.5 milliGRAM(s) Oral every 8 hours PRN anxiety  cyclobenzaprine 5 milliGRAM(s) Oral two times a day PRN Muscle Spasm  furosemide    Tablet 40 milliGRAM(s) Oral daily PRN fluid overload  morphine  - Injectable 2 milliGRAM(s) IV Push every 4 hours PRN Moderate Pain (4 - 6)      HOME MEDICATIONS:  ALPRAZolam 0.5 mg oral tablet (04-26)  aspirin 81 mg oral delayed release tablet (04-26)  digoxin 125 mcg (0.125 mg) oral tablet (04-26)  DULoxetine (04-26)  insulin (04-26)  Jardiance 10 mg oral tablet (04-26)  melatonin 10 mg oral tablet (04-26)  metoprolol succinate 25 mg oral tablet, extended release (04-26)  morphine 15 mg oral tablet (04-26)  prasugrel 10 mg oral tablet (04-26)  spironolactone 25 mg oral tablet (04-26)      PHYSICAL EXAM:  GENERAL: NAD, well-groomed, well-developed  EYE: anicteric sclera, non injected conjunctiva, EOMI  ENT: hearing grossly intact, oropharynx clear  PSYCH: no agitation, baseline mentation  NERVOUS SYSTEM:  Alert & Oriented X3, moving all extremities normally   PULMONARY: Clear to auscultation bilaterally; No rales, rhonchi, wheezing, or rubs  CARDIOVASCULAR: Regular rate and rhythm; No murmurs, rubs, or gallops  GI: Soft, Nontender, Nondistended; Bowel sounds present  EXTREMITIES:  2+ Peripheral Pulses, No clubbing, cyanosis, or edema  LYMPH: No lymphadenopathy noted  SKIN: No rashes or lesions

## 2021-06-05 LAB
ALBUMIN SERPL ELPH-MCNC: 3 G/DL — LOW (ref 3.5–5.2)
ALP SERPL-CCNC: 84 U/L — SIGNIFICANT CHANGE UP (ref 30–115)
ALT FLD-CCNC: 31 U/L — SIGNIFICANT CHANGE UP (ref 0–41)
ANION GAP SERPL CALC-SCNC: 9 MMOL/L — SIGNIFICANT CHANGE UP (ref 7–14)
AST SERPL-CCNC: 32 U/L — SIGNIFICANT CHANGE UP (ref 0–41)
BILIRUB SERPL-MCNC: 0.5 MG/DL — SIGNIFICANT CHANGE UP (ref 0.2–1.2)
BUN SERPL-MCNC: 19 MG/DL — SIGNIFICANT CHANGE UP (ref 10–20)
CALCIUM SERPL-MCNC: 8.3 MG/DL — LOW (ref 8.5–10.1)
CHLORIDE SERPL-SCNC: 102 MMOL/L — SIGNIFICANT CHANGE UP (ref 98–110)
CO2 SERPL-SCNC: 26 MMOL/L — SIGNIFICANT CHANGE UP (ref 17–32)
CREAT SERPL-MCNC: 0.9 MG/DL — SIGNIFICANT CHANGE UP (ref 0.7–1.5)
GLUCOSE BLDC GLUCOMTR-MCNC: 151 MG/DL — HIGH (ref 70–99)
GLUCOSE BLDC GLUCOMTR-MCNC: 174 MG/DL — HIGH (ref 70–99)
GLUCOSE BLDC GLUCOMTR-MCNC: 201 MG/DL — HIGH (ref 70–99)
GLUCOSE BLDC GLUCOMTR-MCNC: 204 MG/DL — HIGH (ref 70–99)
GLUCOSE SERPL-MCNC: 127 MG/DL — HIGH (ref 70–99)
HCT VFR BLD CALC: 30.1 % — LOW (ref 42–52)
HGB BLD-MCNC: 8.9 G/DL — LOW (ref 14–18)
MAGNESIUM SERPL-MCNC: 1.9 MG/DL — SIGNIFICANT CHANGE UP (ref 1.8–2.4)
MCHC RBC-ENTMCNC: 22.2 PG — LOW (ref 27–31)
MCHC RBC-ENTMCNC: 29.6 G/DL — LOW (ref 32–37)
MCV RBC AUTO: 75.1 FL — LOW (ref 80–94)
NRBC # BLD: 0 /100 WBCS — SIGNIFICANT CHANGE UP (ref 0–0)
PLATELET # BLD AUTO: 555 K/UL — HIGH (ref 130–400)
POTASSIUM SERPL-MCNC: 5.5 MMOL/L — HIGH (ref 3.5–5)
POTASSIUM SERPL-SCNC: 5.5 MMOL/L — HIGH (ref 3.5–5)
PROT SERPL-MCNC: 6.6 G/DL — SIGNIFICANT CHANGE UP (ref 6–8)
RBC # BLD: 4.01 M/UL — LOW (ref 4.7–6.1)
RBC # FLD: 25.8 % — HIGH (ref 11.5–14.5)
SODIUM SERPL-SCNC: 137 MMOL/L — SIGNIFICANT CHANGE UP (ref 135–146)
WBC # BLD: 8.89 K/UL — SIGNIFICANT CHANGE UP (ref 4.8–10.8)
WBC # FLD AUTO: 8.89 K/UL — SIGNIFICANT CHANGE UP (ref 4.8–10.8)

## 2021-06-05 PROCEDURE — 99233 SBSQ HOSP IP/OBS HIGH 50: CPT

## 2021-06-05 RX ORDER — OXYCODONE HYDROCHLORIDE 5 MG/1
10 TABLET ORAL EVERY 12 HOURS
Refills: 0 | Status: DISCONTINUED | OUTPATIENT
Start: 2021-06-05 | End: 2021-06-08

## 2021-06-05 RX ORDER — LACTULOSE 10 G/15ML
10 SOLUTION ORAL ONCE
Refills: 0 | Status: COMPLETED | OUTPATIENT
Start: 2021-06-05 | End: 2021-06-05

## 2021-06-05 RX ORDER — MORPHINE SULFATE 50 MG/1
1 CAPSULE, EXTENDED RELEASE ORAL ONCE
Refills: 0 | Status: DISCONTINUED | OUTPATIENT
Start: 2021-06-05 | End: 2021-06-06

## 2021-06-05 RX ORDER — MORPHINE SULFATE 50 MG/1
1 CAPSULE, EXTENDED RELEASE ORAL EVERY 8 HOURS
Refills: 0 | Status: DISCONTINUED | OUTPATIENT
Start: 2021-06-05 | End: 2021-06-08

## 2021-06-05 RX ORDER — OXYCODONE HYDROCHLORIDE 5 MG/1
10 TABLET ORAL EVERY 4 HOURS
Refills: 0 | Status: DISCONTINUED | OUTPATIENT
Start: 2021-06-05 | End: 2021-06-08

## 2021-06-05 RX ADMIN — Medication 2: at 08:01

## 2021-06-05 RX ADMIN — NAFCILLIN 200 GRAM(S): 10 INJECTION, POWDER, FOR SOLUTION INTRAVENOUS at 01:40

## 2021-06-05 RX ADMIN — NAFCILLIN 200 GRAM(S): 10 INJECTION, POWDER, FOR SOLUTION INTRAVENOUS at 06:16

## 2021-06-05 RX ADMIN — OXYCODONE HYDROCHLORIDE 10 MILLIGRAM(S): 5 TABLET ORAL at 17:41

## 2021-06-05 RX ADMIN — Medication 3 UNIT(S): at 16:34

## 2021-06-05 RX ADMIN — GABAPENTIN 600 MILLIGRAM(S): 400 CAPSULE ORAL at 06:14

## 2021-06-05 RX ADMIN — INSULIN GLARGINE 25 UNIT(S): 100 INJECTION, SOLUTION SUBCUTANEOUS at 08:02

## 2021-06-05 RX ADMIN — Medication 4: at 16:35

## 2021-06-05 RX ADMIN — Medication 25 MILLIGRAM(S): at 06:13

## 2021-06-05 RX ADMIN — OXYCODONE HYDROCHLORIDE 10 MILLIGRAM(S): 5 TABLET ORAL at 06:18

## 2021-06-05 RX ADMIN — Medication 4: at 11:38

## 2021-06-05 RX ADMIN — SPIRONOLACTONE 25 MILLIGRAM(S): 25 TABLET, FILM COATED ORAL at 06:13

## 2021-06-05 RX ADMIN — GABAPENTIN 600 MILLIGRAM(S): 400 CAPSULE ORAL at 13:16

## 2021-06-05 RX ADMIN — NAFCILLIN 200 GRAM(S): 10 INJECTION, POWDER, FOR SOLUTION INTRAVENOUS at 21:17

## 2021-06-05 RX ADMIN — Medication 81 MILLIGRAM(S): at 11:38

## 2021-06-05 RX ADMIN — Medication 125 MICROGRAM(S): at 06:14

## 2021-06-05 RX ADMIN — DULOXETINE HYDROCHLORIDE 90 MILLIGRAM(S): 30 CAPSULE, DELAYED RELEASE ORAL at 11:38

## 2021-06-05 RX ADMIN — PRASUGREL 10 MILLIGRAM(S): 5 TABLET, FILM COATED ORAL at 11:39

## 2021-06-05 RX ADMIN — OXYCODONE HYDROCHLORIDE 10 MILLIGRAM(S): 5 TABLET ORAL at 18:42

## 2021-06-05 RX ADMIN — OXYCODONE HYDROCHLORIDE 10 MILLIGRAM(S): 5 TABLET ORAL at 23:28

## 2021-06-05 RX ADMIN — MORPHINE SULFATE 2 MILLIGRAM(S): 50 CAPSULE, EXTENDED RELEASE ORAL at 06:15

## 2021-06-05 RX ADMIN — NAFCILLIN 200 GRAM(S): 10 INJECTION, POWDER, FOR SOLUTION INTRAVENOUS at 10:50

## 2021-06-05 RX ADMIN — CYCLOBENZAPRINE HYDROCHLORIDE 5 MILLIGRAM(S): 10 TABLET, FILM COATED ORAL at 17:41

## 2021-06-05 RX ADMIN — NAFCILLIN 200 GRAM(S): 10 INJECTION, POWDER, FOR SOLUTION INTRAVENOUS at 17:31

## 2021-06-05 RX ADMIN — NAFCILLIN 200 GRAM(S): 10 INJECTION, POWDER, FOR SOLUTION INTRAVENOUS at 13:16

## 2021-06-05 RX ADMIN — ATORVASTATIN CALCIUM 80 MILLIGRAM(S): 80 TABLET, FILM COATED ORAL at 21:17

## 2021-06-05 RX ADMIN — ENOXAPARIN SODIUM 40 MILLIGRAM(S): 100 INJECTION SUBCUTANEOUS at 11:39

## 2021-06-05 RX ADMIN — Medication 3 UNIT(S): at 08:01

## 2021-06-05 RX ADMIN — Medication 3 UNIT(S): at 11:37

## 2021-06-05 RX ADMIN — GABAPENTIN 600 MILLIGRAM(S): 400 CAPSULE ORAL at 21:17

## 2021-06-05 RX ADMIN — CYCLOBENZAPRINE HYDROCHLORIDE 5 MILLIGRAM(S): 10 TABLET, FILM COATED ORAL at 08:02

## 2021-06-05 NOTE — PROGRESS NOTE ADULT - SUBJECTIVE AND OBJECTIVE BOX
T H I S   I S    N O  T   A    F I N A L I Z E D   N O T FLOWER LOERA  65y, Male  Allergy: ACE inhibitors (Hives)  carvedilol (Other)  enalapril (Hives)  Entresto (Other)    Hospital Day: 40d    Patient seen and examined earlier today.     PMH/PSH:  PAST MEDICAL & SURGICAL HISTORY:  Status post percutaneous transluminal coronary angioplasty  2 stents    Atherosclerosis of coronary artery  CAD (coronary artery disease)    Osteoarthritis    HLD (hyperlipidemia)    Blood clot due to device, implant, or graft  was on blood thinners    Diabetes  on insulin pump    HTN (hypertension)    CHF (congestive heart failure)  EF ~ 25%    History of celiac disease    Diverticulitis    STEMI (ST elevation myocardial infarction)    Diabetic neuropathy    Anxiety and depression    Other postprocedural status  Fixation hardware in foot LEFT    Stented coronary artery  10/18 heart attack  INFERIOR WALL MI    S/P CABG x 1  2018    S/P TKR (total knee replacement), right  with infection Mrsa   per pt he was cleared from MRSA infection    Surgery, elective  right knee wound debridement    History of open reduction and internal fixation (ORIF) procedure    H/O shoulder surgery  right    AICD (automatic cardioverter/defibrillator) present    Cholecystostomy care  drain inserted 2020 &amp; removed 4 months ago    History of tonsillectomy    History of hip replacement, total, right        LAST 24-Hr EVENTS:    VITALS:  T(F): 98.5 (06-05-21 @ 12:53), Max: 98.7 (06-05-21 @ 05:38)  HR: 83 (06-05-21 @ 12:53)  BP: 112/68 (06-05-21 @ 12:53) (104/58 - 117/66)  RR: 18 (06-05-21 @ 12:53)  SpO2: 96% (06-04-21 @ 16:54)        TESTS & MEASUREMENTS:  Weight (Kg):       06-03-21 @ 07:01  -  06-04-21 @ 07:00  --------------------------------------------------------  IN: 660 mL / OUT: 1100 mL / NET: -440 mL    06-04-21 @ 07:01  -  06-05-21 @ 07:00  --------------------------------------------------------  IN: 0 mL / OUT: 300 mL / NET: -300 mL    06-05-21 @ 07:01  -  06-05-21 @ 14:03  --------------------------------------------------------  IN: 0 mL / OUT: 200 mL / NET: -200 mL                            8.9    8.89  )-----------( 555      ( 05 Jun 2021 05:28 )             30.1       06-05    137  |  102  |  19  ----------------------------<  127<H>  5.5<H>   |  26  |  0.9    Ca    8.3<L>      05 Jun 2021 05:28  Mg     1.9     06-05    TPro  6.6  /  Alb  3.0<L>  /  TBili  0.5  /  DBili  x   /  AST  32  /  ALT  31  /  AlkPhos  84  06-05    LIVER FUNCTIONS - ( 05 Jun 2021 05:28 )  Alb: 3.0 g/dL / Pro: 6.6 g/dL / ALK PHOS: 84 U/L / ALT: 31 U/L / AST: 32 U/L / GGT: x                           COVID-19 PCR: NotDetec (06-03-21 @ 18:18)      RADIOLOGY, ECG, & ADDITIONAL TESTS:      RECENT DIAGNOSTIC ORDERS:  Calcium, Ionized: AM Sched. Collection: 06-Jun-2021 04:30 (06-05-21 @ 12:39)  Complete Blood Count: AM Sched. Collection: 07-Jun-2021 04:30 (06-04-21 @ 17:53)  Complete Blood Count: AM Sched. Collection: 06-Jun-2021 04:30 (06-04-21 @ 17:53)  Comprehensive Metabolic Panel: AM Sched. Collection: 07-Jun-2021 04:30 (06-04-21 @ 17:53)  Comprehensive Metabolic Panel: AM Sched. Collection: 06-Jun-2021 04:30 (06-04-21 @ 17:53)  Magnesium, Serum: AM Sched. Collection: 07-Jun-2021 04:30 (06-04-21 @ 17:53)  Magnesium, Serum: AM Sched. Collection: 06-Jun-2021 04:30 (06-04-21 @ 17:53)      MEDICATIONS:  MEDICATIONS  (STANDING):  aspirin  chewable 81 milliGRAM(s) Oral daily  atorvastatin 80 milliGRAM(s) Oral at bedtime  dextrose 40% Gel 15 Gram(s) Oral once  dextrose 5%. 1000 milliLiter(s) (50 mL/Hr) IV Continuous <Continuous>  dextrose 5%. 1000 milliLiter(s) (100 mL/Hr) IV Continuous <Continuous>  dextrose 50% Injectable 25 Gram(s) IV Push once  dextrose 50% Injectable 12.5 Gram(s) IV Push once  dextrose 50% Injectable 25 Gram(s) IV Push once  digoxin     Tablet 125 MICROGram(s) Oral daily  DULoxetine 90 milliGRAM(s) Oral daily  enoxaparin Injectable 40 milliGRAM(s) SubCutaneous daily  gabapentin 600 milliGRAM(s) Oral three times a day  glucagon  Injectable 1 milliGRAM(s) IntraMuscular once  insulin glargine Injectable (LANTUS) 25 Unit(s) SubCutaneous every morning  insulin lispro (ADMELOG) corrective regimen sliding scale   SubCutaneous three times a day before meals  insulin lispro Injectable (ADMELOG) 3 Unit(s) SubCutaneous three times a day before meals  metoprolol succinate ER 25 milliGRAM(s) Oral daily  morphine  - Injectable 1 milliGRAM(s) IV Push once  nafcillin  IVPB 2 Gram(s) IV Intermittent every 4 hours  prasugrel 10 milliGRAM(s) Oral daily  spironolactone 25 milliGRAM(s) Oral daily    MEDICATIONS  (PRN):  acetaminophen   Tablet .. 650 milliGRAM(s) Oral every 8 hours PRN Temp greater or equal to 38C (100.4F), Moderate Pain (4 - 6)  ALPRAZolam 0.5 milliGRAM(s) Oral every 8 hours PRN anxiety  cyclobenzaprine 5 milliGRAM(s) Oral two times a day PRN Muscle Spasm  furosemide    Tablet 40 milliGRAM(s) Oral daily PRN fluid overload  morphine  - Injectable 2 milliGRAM(s) IV Push every 4 hours PRN Moderate Pain (4 - 6)      HOME MEDICATIONS:  ALPRAZolam 0.5 mg oral tablet (04-26)  aspirin 81 mg oral delayed release tablet (04-26)  digoxin 125 mcg (0.125 mg) oral tablet (04-26)  DULoxetine (04-26)  insulin (04-26)  Jardiance 10 mg oral tablet (04-26)  melatonin 10 mg oral tablet (04-26)  metoprolol succinate 25 mg oral tablet, extended release (04-26)  morphine 15 mg oral tablet (04-26)  prasugrel 10 mg oral tablet (04-26)  spironolactone 25 mg oral tablet (04-26)      PHYSICAL EXAM:  GENERAL: A&O x3,  in NAD  HNENT: EOMI, PERRLA    NECK: No LAD/swelling  CHEST/LUNG:  CTAB no wheezes/rales/ronchi  HEART: RRR, No murmurs  ABDOMEN: Soft, NT, ND,  Bowel sounds present  EXTREMITIES:  Warm well perfused, no edema        FLOWER MARISCAL  65y, Male  Allergy: ACE inhibitors (Hives)  carvedilol (Other)  enalapril (Hives)  Entresto (Other)    Hospital Day: 40d    Patient seen and examined earlier today. Frustrated that he continues to have pain in his knee, and wants to appeal his discharge. Family provided number for an acute rehab facility that they know and said and will accept him. CM to work on this.     PMH/PSH:  PAST MEDICAL & SURGICAL HISTORY:  Status post percutaneous transluminal coronary angioplasty  2 stents    Atherosclerosis of coronary artery  CAD (coronary artery disease)    Osteoarthritis    HLD (hyperlipidemia)    Blood clot due to device, implant, or graft  was on blood thinners    Diabetes  on insulin pump    HTN (hypertension)    CHF (congestive heart failure)  EF ~ 25%    History of celiac disease    Diverticulitis    STEMI (ST elevation myocardial infarction)    Diabetic neuropathy    Anxiety and depression    Other postprocedural status  Fixation hardware in foot LEFT    Stented coronary artery  10/18 heart attack  INFERIOR WALL MI    S/P CABG x 1  2018    S/P TKR (total knee replacement), right  with infection Mrsa   per pt he was cleared from MRSA infection    Surgery, elective  right knee wound debridement    History of open reduction and internal fixation (ORIF) procedure    H/O shoulder surgery  right    AICD (automatic cardioverter/defibrillator) present    Cholecystostomy care  drain inserted 2020 &amp; removed 4 months ago    History of tonsillectomy    History of hip replacement, total, right        LAST 24-Hr EVENTS:    VITALS:  T(F): 98.5 (06-05-21 @ 12:53), Max: 98.7 (06-05-21 @ 05:38)  HR: 83 (06-05-21 @ 12:53)  BP: 112/68 (06-05-21 @ 12:53) (104/58 - 117/66)  RR: 18 (06-05-21 @ 12:53)  SpO2: 96% (06-04-21 @ 16:54)        TESTS & MEASUREMENTS:  Weight (Kg):       06-03-21 @ 07:01  -  06-04-21 @ 07:00  --------------------------------------------------------  IN: 660 mL / OUT: 1100 mL / NET: -440 mL    06-04-21 @ 07:01  -  06-05-21 @ 07:00  --------------------------------------------------------  IN: 0 mL / OUT: 300 mL / NET: -300 mL    06-05-21 @ 07:01  -  06-05-21 @ 14:03  --------------------------------------------------------  IN: 0 mL / OUT: 200 mL / NET: -200 mL                            8.9    8.89  )-----------( 555      ( 05 Jun 2021 05:28 )             30.1       06-05    137  |  102  |  19  ----------------------------<  127<H>  5.5<H>   |  26  |  0.9    Ca    8.3<L>      05 Jun 2021 05:28  Mg     1.9     06-05    TPro  6.6  /  Alb  3.0<L>  /  TBili  0.5  /  DBili  x   /  AST  32  /  ALT  31  /  AlkPhos  84  06-05    LIVER FUNCTIONS - ( 05 Jun 2021 05:28 )  Alb: 3.0 g/dL / Pro: 6.6 g/dL / ALK PHOS: 84 U/L / ALT: 31 U/L / AST: 32 U/L / GGT: x                           COVID-19 PCR: NotDetec (06-03-21 @ 18:18)      RADIOLOGY, ECG, & ADDITIONAL TESTS:      RECENT DIAGNOSTIC ORDERS:  Calcium, Ionized: AM Sched. Collection: 06-Jun-2021 04:30 (06-05-21 @ 12:39)  Complete Blood Count: AM Sched. Collection: 07-Jun-2021 04:30 (06-04-21 @ 17:53)  Complete Blood Count: AM Sched. Collection: 06-Jun-2021 04:30 (06-04-21 @ 17:53)  Comprehensive Metabolic Panel: AM Sched. Collection: 07-Jun-2021 04:30 (06-04-21 @ 17:53)  Comprehensive Metabolic Panel: AM Sched. Collection: 06-Jun-2021 04:30 (06-04-21 @ 17:53)  Magnesium, Serum: AM Sched. Collection: 07-Jun-2021 04:30 (06-04-21 @ 17:53)  Magnesium, Serum: AM Sched. Collection: 06-Jun-2021 04:30 (06-04-21 @ 17:53)      MEDICATIONS:  MEDICATIONS  (STANDING):  aspirin  chewable 81 milliGRAM(s) Oral daily  atorvastatin 80 milliGRAM(s) Oral at bedtime  dextrose 40% Gel 15 Gram(s) Oral once  dextrose 5%. 1000 milliLiter(s) (50 mL/Hr) IV Continuous <Continuous>  dextrose 5%. 1000 milliLiter(s) (100 mL/Hr) IV Continuous <Continuous>  dextrose 50% Injectable 25 Gram(s) IV Push once  dextrose 50% Injectable 12.5 Gram(s) IV Push once  dextrose 50% Injectable 25 Gram(s) IV Push once  digoxin     Tablet 125 MICROGram(s) Oral daily  DULoxetine 90 milliGRAM(s) Oral daily  enoxaparin Injectable 40 milliGRAM(s) SubCutaneous daily  gabapentin 600 milliGRAM(s) Oral three times a day  glucagon  Injectable 1 milliGRAM(s) IntraMuscular once  insulin glargine Injectable (LANTUS) 25 Unit(s) SubCutaneous every morning  insulin lispro (ADMELOG) corrective regimen sliding scale   SubCutaneous three times a day before meals  insulin lispro Injectable (ADMELOG) 3 Unit(s) SubCutaneous three times a day before meals  metoprolol succinate ER 25 milliGRAM(s) Oral daily  morphine  - Injectable 1 milliGRAM(s) IV Push once  nafcillin  IVPB 2 Gram(s) IV Intermittent every 4 hours  prasugrel 10 milliGRAM(s) Oral daily  spironolactone 25 milliGRAM(s) Oral daily    MEDICATIONS  (PRN):  acetaminophen   Tablet .. 650 milliGRAM(s) Oral every 8 hours PRN Temp greater or equal to 38C (100.4F), Moderate Pain (4 - 6)  ALPRAZolam 0.5 milliGRAM(s) Oral every 8 hours PRN anxiety  cyclobenzaprine 5 milliGRAM(s) Oral two times a day PRN Muscle Spasm  furosemide    Tablet 40 milliGRAM(s) Oral daily PRN fluid overload  morphine  - Injectable 2 milliGRAM(s) IV Push every 4 hours PRN Moderate Pain (4 - 6)      HOME MEDICATIONS:  ALPRAZolam 0.5 mg oral tablet (04-26)  aspirin 81 mg oral delayed release tablet (04-26)  digoxin 125 mcg (0.125 mg) oral tablet (04-26)  DULoxetine (04-26)  insulin (04-26)  Jardiance 10 mg oral tablet (04-26)  melatonin 10 mg oral tablet (04-26)  metoprolol succinate 25 mg oral tablet, extended release (04-26)  morphine 15 mg oral tablet (04-26)  prasugrel 10 mg oral tablet (04-26)  spironolactone 25 mg oral tablet (04-26)      PHYSICAL EXAM:  GENERAL: NAD, well-groomed, well-developed  EYE: anicteric sclera, non injected conjunctiva, EOMI  ENT: hearing grossly intact, oropharynx clear  PSYCH: no agitation, baseline mentation  NERVOUS SYSTEM:  Alert & Oriented X3, moving all extremities normally   PULMONARY: Clear to auscultation bilaterally; No rales, rhonchi, wheezing, or rubs  CARDIOVASCULAR: Regular rate and rhythm; No murmurs, rubs, or gallops  GI: Soft, Nontender, Nondistended; Bowel sounds present  EXTREMITIES:  2+ Peripheral Pulses, No clubbing, cyanosis, or edema  LYMPH: No lymphadenopathy noted  SKIN: No rashes or lesions

## 2021-06-05 NOTE — PROGRESS NOTE ADULT - ASSESSMENT
64 yo male admitted with left septic knee and severe PVD    # Left Knee Septic Arthritis with MSSA bacteremia   - repeat Blood Cx negative   - S/P left knee wash out by ortho  - Ortho following; recs appreciated          Left knee lateral drain removed (6/1), Medial knee drain and occlusive dressing removed on 6/3  - ID following: -  will need 6 weeks from time of last debridement (5/27-7/7) -- to finish with cefazolin 2g q 8 hours  - PICC line placed on 6/4  - PT following; STR vs AR     #Biliary Drainage from previous perc sudhir site- resolved   - Previous Intraabdominal Cx 9/4/2020 -- VRE faecium and pseudomonas aeruginosa  - Wound Cx 5/1 growing Pseudomonas/VRE Faecium  - CT Abdomen and Pelvis w/ IV Cont (04.26.21 @ 04:42): No evidence of acute intra-abdominal pathology. Decreased area of right upper quadrant subcutaneous stranding at site of prior percutaneous cholecystostomy, without evidence of abscess  - HIDA scan unable to visualize contrast in GB   - spoke w/ Dr. Tello 5/3 after HIDA who recommended outpatient ERCP and cholecystectomy to divert bile and heal fistula  - At this time RUQ tract appears to be closed and well healed w/o active drainage   - LFT's unremarkable   - spoke w/ Dr. 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  - No further intervention per Ortho     # PVD  - Arterial doppler 5/29 of bilateral LE show normal arterial flow in the bilateral lower extremities. Limited exam due to bandages  - Vascular Cardiology team following, no further intervention   c/w ASA and Effient and high intensity statin   c/w GDMT for HFrEF     # DM2 c/b hyperglycemia   - increase Lantus to 25u qam   - cont lispro 3 tid w/ meals   - SSI      #Microcytic Anemia   - Iron  def combined with ACD    #CAD s/p PCI to RCA and OM1, s/p LIMA to LAD (2018)   #Severe ischemic cardiomyopathy w/ reduced EF s/p CRT-D   #HTN   #DLD   - JOHN PAUL 5/2021 w/ ef 20-25%  c/w ASA and Effient   Resume high intensity statin  resume GDMT( Toprol, Aldactone, Digoxn, Lasix prn )  for HFrEF     # Covid vaccine status:   He has received one dose and is supposed to receive the second dose on 5/15/21 but he was already hospitalized. ID on board and will appreciate their input on that.      # DVT ppx: Lovenox      #Progress Note Handoff:   Pending (specify):  dc pending appeal   Family discussion: d/w patient at length   Disposition: Home vs SNF vs Acute rehab       Krysten Ohara DO

## 2021-06-06 ENCOUNTER — TRANSCRIPTION ENCOUNTER (OUTPATIENT)
Age: 66
End: 2021-06-06

## 2021-06-06 LAB
ALBUMIN SERPL ELPH-MCNC: 2.5 G/DL — LOW (ref 3.5–5.2)
ALP SERPL-CCNC: 85 U/L — SIGNIFICANT CHANGE UP (ref 30–115)
ALT FLD-CCNC: 37 U/L — SIGNIFICANT CHANGE UP (ref 0–41)
ANION GAP SERPL CALC-SCNC: 12 MMOL/L — SIGNIFICANT CHANGE UP (ref 7–14)
ANION GAP SERPL CALC-SCNC: 9 MMOL/L — SIGNIFICANT CHANGE UP (ref 7–14)
AST SERPL-CCNC: 61 U/L — HIGH (ref 0–41)
BILIRUB SERPL-MCNC: 0.6 MG/DL — SIGNIFICANT CHANGE UP (ref 0.2–1.2)
BUN SERPL-MCNC: 20 MG/DL — SIGNIFICANT CHANGE UP (ref 10–20)
BUN SERPL-MCNC: 20 MG/DL — SIGNIFICANT CHANGE UP (ref 10–20)
CALCIUM SERPL-MCNC: 8.1 MG/DL — LOW (ref 8.5–10.1)
CALCIUM SERPL-MCNC: 8.2 MG/DL — LOW (ref 8.5–10.1)
CHLORIDE SERPL-SCNC: 102 MMOL/L — SIGNIFICANT CHANGE UP (ref 98–110)
CHLORIDE SERPL-SCNC: 104 MMOL/L — SIGNIFICANT CHANGE UP (ref 98–110)
CO2 SERPL-SCNC: 20 MMOL/L — SIGNIFICANT CHANGE UP (ref 17–32)
CO2 SERPL-SCNC: 24 MMOL/L — SIGNIFICANT CHANGE UP (ref 17–32)
CREAT SERPL-MCNC: 0.9 MG/DL — SIGNIFICANT CHANGE UP (ref 0.7–1.5)
CREAT SERPL-MCNC: 0.9 MG/DL — SIGNIFICANT CHANGE UP (ref 0.7–1.5)
GLUCOSE BLDC GLUCOMTR-MCNC: 139 MG/DL — HIGH (ref 70–99)
GLUCOSE BLDC GLUCOMTR-MCNC: 161 MG/DL — HIGH (ref 70–99)
GLUCOSE BLDC GLUCOMTR-MCNC: 173 MG/DL — HIGH (ref 70–99)
GLUCOSE BLDC GLUCOMTR-MCNC: 259 MG/DL — HIGH (ref 70–99)
GLUCOSE SERPL-MCNC: 117 MG/DL — HIGH (ref 70–99)
GLUCOSE SERPL-MCNC: 173 MG/DL — HIGH (ref 70–99)
HCT VFR BLD CALC: 29.9 % — LOW (ref 42–52)
HGB BLD-MCNC: 8.7 G/DL — LOW (ref 14–18)
MAGNESIUM SERPL-MCNC: 1.9 MG/DL — SIGNIFICANT CHANGE UP (ref 1.8–2.4)
MCHC RBC-ENTMCNC: 22.5 PG — LOW (ref 27–31)
MCHC RBC-ENTMCNC: 29.1 G/DL — LOW (ref 32–37)
MCV RBC AUTO: 77.3 FL — LOW (ref 80–94)
NRBC # BLD: 0 /100 WBCS — SIGNIFICANT CHANGE UP (ref 0–0)
PLATELET # BLD AUTO: 501 K/UL — HIGH (ref 130–400)
POTASSIUM SERPL-MCNC: 5.1 MMOL/L — HIGH (ref 3.5–5)
POTASSIUM SERPL-MCNC: 6 MMOL/L — CRITICAL HIGH (ref 3.5–5)
POTASSIUM SERPL-SCNC: 5.1 MMOL/L — HIGH (ref 3.5–5)
POTASSIUM SERPL-SCNC: 6 MMOL/L — CRITICAL HIGH (ref 3.5–5)
PROT SERPL-MCNC: 6.4 G/DL — SIGNIFICANT CHANGE UP (ref 6–8)
RBC # BLD: 3.87 M/UL — LOW (ref 4.7–6.1)
RBC # FLD: 26.1 % — HIGH (ref 11.5–14.5)
SODIUM SERPL-SCNC: 135 MMOL/L — SIGNIFICANT CHANGE UP (ref 135–146)
SODIUM SERPL-SCNC: 136 MMOL/L — SIGNIFICANT CHANGE UP (ref 135–146)
WBC # BLD: 8.55 K/UL — SIGNIFICANT CHANGE UP (ref 4.8–10.8)
WBC # FLD AUTO: 8.55 K/UL — SIGNIFICANT CHANGE UP (ref 4.8–10.8)

## 2021-06-06 PROCEDURE — 99233 SBSQ HOSP IP/OBS HIGH 50: CPT

## 2021-06-06 RX ORDER — MORPHINE SULFATE 50 MG/1
1 CAPSULE, EXTENDED RELEASE ORAL ONCE
Refills: 0 | Status: DISCONTINUED | OUTPATIENT
Start: 2021-06-06 | End: 2021-06-06

## 2021-06-06 RX ORDER — ONDANSETRON 8 MG/1
4 TABLET, FILM COATED ORAL THREE TIMES A DAY
Refills: 0 | Status: DISCONTINUED | OUTPATIENT
Start: 2021-06-06 | End: 2021-06-15

## 2021-06-06 RX ADMIN — Medication 3 UNIT(S): at 17:12

## 2021-06-06 RX ADMIN — INSULIN GLARGINE 25 UNIT(S): 100 INJECTION, SOLUTION SUBCUTANEOUS at 07:31

## 2021-06-06 RX ADMIN — Medication 81 MILLIGRAM(S): at 11:49

## 2021-06-06 RX ADMIN — GABAPENTIN 600 MILLIGRAM(S): 400 CAPSULE ORAL at 13:25

## 2021-06-06 RX ADMIN — OXYCODONE HYDROCHLORIDE 10 MILLIGRAM(S): 5 TABLET ORAL at 22:46

## 2021-06-06 RX ADMIN — NAFCILLIN 200 GRAM(S): 10 INJECTION, POWDER, FOR SOLUTION INTRAVENOUS at 01:52

## 2021-06-06 RX ADMIN — DULOXETINE HYDROCHLORIDE 90 MILLIGRAM(S): 30 CAPSULE, DELAYED RELEASE ORAL at 11:50

## 2021-06-06 RX ADMIN — Medication 2: at 07:31

## 2021-06-06 RX ADMIN — Medication 3 UNIT(S): at 07:31

## 2021-06-06 RX ADMIN — NAFCILLIN 200 GRAM(S): 10 INJECTION, POWDER, FOR SOLUTION INTRAVENOUS at 05:39

## 2021-06-06 RX ADMIN — Medication 125 MICROGRAM(S): at 05:39

## 2021-06-06 RX ADMIN — Medication 25 MILLIGRAM(S): at 05:39

## 2021-06-06 RX ADMIN — PRASUGREL 10 MILLIGRAM(S): 5 TABLET, FILM COATED ORAL at 11:50

## 2021-06-06 RX ADMIN — ATORVASTATIN CALCIUM 80 MILLIGRAM(S): 80 TABLET, FILM COATED ORAL at 21:25

## 2021-06-06 RX ADMIN — MORPHINE SULFATE 1 MILLIGRAM(S): 50 CAPSULE, EXTENDED RELEASE ORAL at 14:20

## 2021-06-06 RX ADMIN — NAFCILLIN 200 GRAM(S): 10 INJECTION, POWDER, FOR SOLUTION INTRAVENOUS at 21:25

## 2021-06-06 RX ADMIN — SPIRONOLACTONE 25 MILLIGRAM(S): 25 TABLET, FILM COATED ORAL at 05:39

## 2021-06-06 RX ADMIN — OXYCODONE HYDROCHLORIDE 10 MILLIGRAM(S): 5 TABLET ORAL at 17:14

## 2021-06-06 RX ADMIN — MORPHINE SULFATE 1 MILLIGRAM(S): 50 CAPSULE, EXTENDED RELEASE ORAL at 10:55

## 2021-06-06 RX ADMIN — OXYCODONE HYDROCHLORIDE 10 MILLIGRAM(S): 5 TABLET ORAL at 06:55

## 2021-06-06 RX ADMIN — OXYCODONE HYDROCHLORIDE 10 MILLIGRAM(S): 5 TABLET ORAL at 00:31

## 2021-06-06 RX ADMIN — MORPHINE SULFATE 1 MILLIGRAM(S): 50 CAPSULE, EXTENDED RELEASE ORAL at 13:42

## 2021-06-06 RX ADMIN — Medication 6: at 17:11

## 2021-06-06 RX ADMIN — MORPHINE SULFATE 1 MILLIGRAM(S): 50 CAPSULE, EXTENDED RELEASE ORAL at 02:30

## 2021-06-06 RX ADMIN — Medication 3 UNIT(S): at 11:48

## 2021-06-06 RX ADMIN — NAFCILLIN 200 GRAM(S): 10 INJECTION, POWDER, FOR SOLUTION INTRAVENOUS at 17:14

## 2021-06-06 RX ADMIN — CYCLOBENZAPRINE HYDROCHLORIDE 5 MILLIGRAM(S): 10 TABLET, FILM COATED ORAL at 21:25

## 2021-06-06 RX ADMIN — OXYCODONE HYDROCHLORIDE 10 MILLIGRAM(S): 5 TABLET ORAL at 05:54

## 2021-06-06 RX ADMIN — GABAPENTIN 600 MILLIGRAM(S): 400 CAPSULE ORAL at 21:25

## 2021-06-06 RX ADMIN — NAFCILLIN 200 GRAM(S): 10 INJECTION, POWDER, FOR SOLUTION INTRAVENOUS at 13:24

## 2021-06-06 RX ADMIN — ENOXAPARIN SODIUM 40 MILLIGRAM(S): 100 INJECTION SUBCUTANEOUS at 11:50

## 2021-06-06 RX ADMIN — OXYCODONE HYDROCHLORIDE 10 MILLIGRAM(S): 5 TABLET ORAL at 17:30

## 2021-06-06 RX ADMIN — ONDANSETRON 4 MILLIGRAM(S): 8 TABLET, FILM COATED ORAL at 20:54

## 2021-06-06 RX ADMIN — NAFCILLIN 200 GRAM(S): 10 INJECTION, POWDER, FOR SOLUTION INTRAVENOUS at 10:54

## 2021-06-06 RX ADMIN — MORPHINE SULFATE 1 MILLIGRAM(S): 50 CAPSULE, EXTENDED RELEASE ORAL at 02:17

## 2021-06-06 RX ADMIN — MORPHINE SULFATE 1 MILLIGRAM(S): 50 CAPSULE, EXTENDED RELEASE ORAL at 12:22

## 2021-06-06 RX ADMIN — GABAPENTIN 600 MILLIGRAM(S): 400 CAPSULE ORAL at 05:54

## 2021-06-06 NOTE — DISCHARGE NOTE PROVIDER - HOSPITAL COURSE
65 year old male has medical history of  CAD s/p MI, PCI/CABG?, CHFrEF (15-20%), has ICD, HTN, Celiac disease, DM2, neuropathy, chronic b/l LE ulcers, hx infected R TKR with MRSA (was eventually cemented so has limited ROM R knee. 65 year old male with PMH of CAD s/p MI, PCI/CABG, CHFrEF, DM, and history of cholecystostomy tube ( Feb 2020) for acute cholecystitis s/p removal May 2020 presents for LT knee pain. Pt also reported  bilious drainage observed from site of prior of per sudhir. Orthopedics consulted.     Patient was admitted for septic arthritis of the left knee s/p drainage. Surgery consulted for persistent bilious drainage from the prior cholecystostomy tube site.     [facility] consulted, with no further interventions recommended as fistula output significantly improved since admission. Pt advised to follow up with GI outpatient. History of Present Illness:  65 year old male with PMH of CAD s/p MI, PCI/CABG, CHFrEF (15-20%), has ICD, HTN, celiac disease, DM, neuropathy, chronic b/l LE ulcers presents, severe chronic pain,  hx infected R TKR with MRSA (was eventually cemented so has limited ROM R knee), and history of cholecystostomy tube placement in February 2020 for acute cholecystitis s/p removal in May 2020 with multiple presentations including persistent bilious drainage from the prior tract site as well as a RUQ abdominal abscess at the site s/p drainage. He presented to ED with inability to walk/ambulate 2/2 left knee pain. Pt has hx of knee pain and infections. 2 weeks ago pt received a 'steroid' shot for his left knee for pain. 2 days later, his knee began to swell and he was unable to walk or bend the knee. It progressively gotten worse to the point where the pain was unbearable so he presented to the ED.     Pt endorses a weight loss >70 lbs over the last year, there is bilious drainage coming from where he had a prior per sudhir, jaundice in the finger tips and sclera. Recent admission for uncontrollable movements, no diagnosis given. Pt denies f/c/n/v, chest pain, headaches, UTI symptoms     In the ED:  Joint was aspirated cloudy yellow fluid was seen and sent for cultures. Neutrophil count >50,000 and RBC >8000. Ortho consulted for septic arthritis, s/p arthroscopic drainage. Surg consulted for bile drainage, HIDA scan, consult GI, possible ERCP. CT A&P, No evidence of acute intra-abdominal pathology. Decreased area of right upper quadrant subcutaneous stranding at site of prior percutaneous cholecystostomy, without evidence of abscess. Vitally stable. Admitted to medicine for medical management.    The patient is being followed by Neurology, and he is followed by Cardiology Dr. Lopez in  and Dr. Lilia Caldera in [facility], also by Endo Dr. Cormier at [facility].    Hospital Course:    # Left Knee Septic Arthritis with MSSA bacteremia- resolving  - repeat Blood Cx negative   - S/P left knee wash out by ortho  - Ortho followed; requesting to follow up as outpatient  - ID followed     ESR/CRP are improving      continue nafcillin 2g q 4 hours     will need 6 weeks from time of last debridement (5/27-7/7) -- to finish with cefazolin 2g q 8 hours     Weekly CBC, CMP, ESR/CRP- PICC line placed on 6/4     ID follow-up with Dr. Woody Cruz for Telehealth. We will call the patient between 10:30-1:30      [address]       [phone]       Fax [phone]  - pain management        Continue with oxycodone ER 20mg q12h        Continue with oxycodone IR 10mg q4h PRN for severe pain       Continue with other non-opioid pharmacotherapy to reduce opioid requirements.       #Biliary Drainage from previous perc sudhir site- resolved   - Previous Intraabdominal Cx 9/4/2020 -- VRE faecium and pseudomonas aeruginosa  - Wound Cx 5/1 growing Pseudomonas/VRE Faecium  - CT Abdomen and Pelvis w/ IV Cont (04.26.21 @ 04:42): No evidence of acute intra-abdominal pathology. Decreased area of right upper quadrant subcutaneous stranding at site of prior percutaneous cholecystostomy, without evidence of abscess  - HIDA scan unable to visualize contrast in GB   - spoke w/ Dr. Tello 5/3 after HIDA who recommended outpatient ERCP and cholecystectomy to divert bile and heal fistula  - At this time RUQ tract appears to be closed and well healed w/o active drainage   - LFT's unremarkable   - spoke w/ Dr. Tello again on 6/3 and 6/9 - no plan for acute intervention, OP follow up in 1-2 weeks. Upon discharge please contact [facility] to schedule follow up for ERCP w/stent placement. Will need to be off AC and DAPT 5 days prior to procedure.    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  - No further intervention per Ortho     # PVD  - Arterial doppler 5/29 of bilateral LE show normal arterial flow in the bilateral lower extremities. Limited exam due to bandages  - Vascular Cardiology team following, no further intervention   c/w ASA and Effient and high intensity statin   c/w GDMT for HFrEF     # DM2 c/b hyperglycemia- well controlled  - Lantus 25u in the morning  - cont lispro 3 tid w/ meals      #Microcytic Anemia   - Iron def + ACD?, no evidence of GI bleed  - will start iron supplementation  - o/p GI f/u    #CAD s/p PCI to RCA and OM1, s/p LIMA to LAD (2018)   #Severe ischemic cardiomyopathy w/ reduced EF s/p CRT-D   #HTN   #DLD   - JOHN PAUL 5/2021 w/ ef 20-25%  - on ASA and Effient   - restarted on high intensity statin  - c/w GDMT (Toprol, Aldactone, Digoxn, Lasix prn ) for HFrEF     Vital Signs Last 24 Hrs  T(C): 36.6 (15 Nael 2021 12:07), Max: 36.6 (15 Nael 2021 12:07)  T(F): 97.8 (15 Nael 2021 12:07), Max: 97.8 (15 Nael 2021 12:07)  HR: 82 (15 Nael 2021 12:07) (75 - 82)  BP: 115/63 (15 Nael 2021 12:07) (108/61 - 120/70)  BP(mean): --  RR: 18 (15 Nael 2021 12:07) (18 - 18)  SpO2: --    PHYSICAL EXAM:  GENERAL: middle-age M-appears chronically ill appearing, NAD, non toxic, sitting up in bed watching tv, malnourished  EYES: anicteric sclera, non injected conjunctiva, EOMI  ENT: hearing grossly intact, oropharynx clear, MMM, fair dentition  PSYCH: no agitation, baseline mentation  NERVOUS SYSTEM:  Alert & Oriented X3, CN 2-12 grossly intact  PULMONARY: Clear to percussion bilaterally; No rales, rhonchi, wheezing, or rubs  CARDIOVASCULAR: Regular rate and rhythm; No murmurs, rubs, or gallops  GI: Soft, Nontender, Nondistended; Bowel sounds present, RUQ scant dark green drainage from fistula track  EXTREMITIES:  2+ Peripheral Pulses, No clubbing, cyanosis, or edema  LYMPH: No lymphadenopathy noted  SKIN: No rashes or lesions

## 2021-06-06 NOTE — DISCHARGE NOTE PROVIDER - NSDCMRMEDTOKEN_GEN_ALL_CORE_FT
ALPRAZolam 0.5 mg oral tablet: 1 tab(s) orally every 8 hours  aspirin 81 mg oral delayed release tablet: 1 tab(s) orally once a day  digoxin 125 mcg (0.125 mg) oral tablet: 1 tab(s) orally once a day  DULoxetine: 90 milligram(s) orally once a day  furosemide 40 mg oral tablet: 1 tab(s) orally once a day, As Needed, for leg swelling  insulin: pump; HUMULIN  Jardiance 10 mg oral tablet: 1 tab(s) orally once a day (in the morning)  melatonin 10 mg oral tablet: 1 tab(s) orally once a day (at bedtime), As needed, Insomnia  metoprolol succinate 25 mg oral tablet, extended release: 1 tab(s) orally 2 times a day  morphine 15 mg oral tablet: 1 tab(s) orally every 4 hours, As Needed - for severe pain (7 to 10 out of 10)  oxycodone-acetaminophen 5 mg-325 mg oral tablet: 1 tab(s) orally every 8 hours, As Needed -for severe pain MDD:4   pantoprazole 40 mg oral delayed release tablet: 1 tab(s) orally once a day in the morning; take 30 minutes before breakfast  prasugrel 10 mg oral tablet: 1 tab(s) orally once a day  rosuvastatin 40 mg oral tablet: 1 tab(s) orally once a day (at bedtime)  spironolactone 25 mg oral tablet: 1 tab(s) orally once a day   acetaminophen 500 mg oral tablet: 2 tab(s) orally every 8 hours  ALPRAZolam 0.5 mg oral tablet: 1 tab(s) orally every 8 hours  baclofen 10 mg oral tablet: 1 tab(s) orally 3 times a day, As needed, Muscle Spasm  ferrous sulfate 325 mg (65 mg elemental iron) oral tablet: 1 tab(s) orally once a day  gabapentin 600 mg oral tablet: 1 tab(s) orally 3 times a day  insulin glargine: 20 unit(s) subcutaneously once a day  Jardiance 10 mg oral tablet: 1 tab(s) orally once a day (in the morning)  melatonin 10 mg oral tablet: 1 tab(s) orally once a day (at bedtime), As needed, Insomnia  morphine 15 mg oral tablet: 1 tab(s) orally every 4 hours, As Needed - for severe pain (7 to 10 out of 10)  nafcillin: 2 gram(s) intravenously every 4 hours  nystatin 100,000 units/g topical cream: 1 application topically 2 times a day  oxycodone-acetaminophen 5 mg-325 mg oral tablet: 1 tab(s) orally every 8 hours, As Needed -for severe pain MDD:4   rosuvastatin 40 mg oral tablet: 1 tab(s) orally once a day (at bedtime)  spironolactone 25 mg oral tablet: 1 tab(s) orally once a day

## 2021-06-06 NOTE — DISCHARGE NOTE PROVIDER - NSDCCPCAREPLAN_GEN_ALL_CORE_FT
PRINCIPAL DISCHARGE DIAGNOSIS  Diagnosis: Knee pain, left  Assessment and Plan of Treatment:       SECONDARY DISCHARGE DIAGNOSES  Diagnosis: Inability to walk  Assessment and Plan of Treatment:     Diagnosis: Leukocytosis  Assessment and Plan of Treatment:     Diagnosis: Chronic anemia  Assessment and Plan of Treatment:     Diagnosis: Chronic pain  Assessment and Plan of Treatment:

## 2021-06-06 NOTE — DISCHARGE NOTE PROVIDER - CARE PROVIDER_API CALL
Mihir Lopez)  Cardiovascular Disease; Interventional Cardiology  [address] [place] [address]  Phone: [phone]  Fax: [phone]  Established Patient  Follow Up Time: Routine

## 2021-06-06 NOTE — PROGRESS NOTE ADULT - ASSESSMENT
66 yo male admitted with left septic knee and severe PVD    # Left Knee Septic Arthritis with MSSA bacteremia   - repeat Blood Cx negative   - S/P left knee wash out by ortho  - Ortho following; recs appreciated          Left knee lateral drain removed (6/1), Medial knee drain and occlusive dressing removed on 6/3  - ID following: -  will need 6 weeks from time of last debridement (5/27-7/7) -- to finish with cefazolin 2g q 8 hours  - PICC line placed on 6/4  - PT following    #Biliary Drainage from previous perc sudhir site- resolved   - Previous Intraabdominal Cx 9/4/2020 -- VRE faecium and pseudomonas aeruginosa  - Wound Cx 5/1 growing Pseudomonas/VRE Faecium  - CT Abdomen and Pelvis w/ IV Cont (04.26.21 @ 04:42): No evidence of acute intra-abdominal pathology. Decreased area of right upper quadrant subcutaneous stranding at site of prior percutaneous cholecystostomy, without evidence of abscess  - HIDA scan unable to visualize contrast in GB   - spoke w/ Dr. Tello 5/3 after HIDA who recommended outpatient ERCP and cholecystectomy to divert bile and heal fistula  - At this time RUQ tract appears to be closed and well healed w/o active drainage   - LFT's unremarkable   - spoke w/ Dr. 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  - No further intervention per Ortho     # PVD  - Arterial doppler 5/29 of bilateral LE show normal arterial flow in the bilateral lower extremities. Limited exam due to bandages  - Vascular Cardiology team following, no further intervention   c/w ASA and Effient and high intensity statin   c/w GDMT for HFrEF     # DM2 c/b hyperglycemia   - increase Lantus to 25u qam   - cont lispro 3 tid w/ meals   - SSI      #Microcytic Anemia   - Iron  def combined with ACD    #CAD s/p PCI to RCA and OM1, s/p LIMA to LAD (2018)   #Severe ischemic cardiomyopathy w/ reduced EF s/p CRT-D   #HTN   #DLD   - JOHN PAUL 5/2021 w/ ef 20-25%  c/w ASA and Effient   Resume high intensity statin  resume GDMT( Toprol, Aldactone, Digoxn, Lasix prn )  for HFrEF     # Covid vaccine status:   He has received one dose and is supposed to receive the second dose on 5/15/21 but he was already hospitalized. ID on board and will appreciate their input on that.      # DVT ppx: Lovenox      #Progress Note Handoff:   Pending (specify):  dc pending AR authorization   Family discussion: d/w patient at bedside   Disposition:  Acute rehab       Krysten Ohara DO

## 2021-06-06 NOTE — PROGRESS NOTE ADULT - SUBJECTIVE AND OBJECTIVE BOX
FLOWER MARISCAL  65y, Male  Allergy: ACE inhibitors (Hives)  carvedilol (Other)  enalapril (Hives)  Entresto (Other)    Hospital Day: 41d    Patient seen and examined earlier today. Feeling well, states his pain is better controlled today, pending AR placement.     PMH/PSH:  PAST MEDICAL & SURGICAL HISTORY:  Status post percutaneous transluminal coronary angioplasty  2 stents    Atherosclerosis of coronary artery  CAD (coronary artery disease)    Osteoarthritis    HLD (hyperlipidemia)    Blood clot due to device, implant, or graft  was on blood thinners    Diabetes  on insulin pump    HTN (hypertension)    CHF (congestive heart failure)  EF ~ 25%    History of celiac disease    Diverticulitis    STEMI (ST elevation myocardial infarction)    Diabetic neuropathy    Anxiety and depression    Other postprocedural status  Fixation hardware in foot LEFT    Stented coronary artery  10/18 heart attack  INFERIOR WALL MI    S/P CABG x 1  2018    S/P TKR (total knee replacement), right  with infection Mrsa   per pt he was cleared from MRSA infection    Surgery, elective  right knee wound debridement    History of open reduction and internal fixation (ORIF) procedure    H/O shoulder surgery  right    AICD (automatic cardioverter/defibrillator) present    Cholecystostomy care  drain inserted 2020 &amp; removed 4 months ago    History of tonsillectomy    History of hip replacement, total, right        LAST 24-Hr EVENTS:    VITALS:  T(F): 97.4 (06-06-21 @ 12:54), Max: 97.8 (06-05-21 @ 21:25)  HR: 76 (06-06-21 @ 12:54)  BP: 120/73 (06-06-21 @ 12:54) (111/65 - 121/64)  RR: 18 (06-06-21 @ 12:54)  SpO2: --        TESTS & MEASUREMENTS:  Weight (Kg):       06-04-21 @ 07:01  -  06-05-21 @ 07:00  --------------------------------------------------------  IN: 0 mL / OUT: 300 mL / NET: -300 mL    06-05-21 @ 07:01  -  06-06-21 @ 07:00  --------------------------------------------------------  IN: 300 mL / OUT: 1300 mL / NET: -1000 mL    06-06-21 @ 07:01  -  06-06-21 @ 17:52  --------------------------------------------------------  IN: 0 mL / OUT: 400 mL / NET: -400 mL                            8.7    8.55  )-----------( 501      ( 06 Jun 2021 06:54 )             29.9       06-06    136  |  104  |  20  ----------------------------<  117<H>  5.1<H>   |  20  |  0.9    Ca    8.2<L>      06 Jun 2021 11:39  Mg     1.9     06-06    TPro  6.4  /  Alb  2.5<L>  /  TBili  0.6  /  DBili  x   /  AST  61<H>  /  ALT  37  /  AlkPhos  85  06-06    LIVER FUNCTIONS - ( 06 Jun 2021 06:54 )  Alb: 2.5 g/dL / Pro: 6.4 g/dL / ALK PHOS: 85 U/L / ALT: 37 U/L / AST: 61 U/L / GGT: x                           COVID-19 PCR: NotDetec (06-03-21 @ 18:18)      RADIOLOGY, ECG, & ADDITIONAL TESTS:      RECENT DIAGNOSTIC ORDERS:  C-Reactive Protein, Serum: AM Sched. Collection: 07-Jun-2021 04:30 (06-06-21 @ 12:52)  Sedimentation Rate, Erythrocyte: AM Sched. Collection: 07-Jun-2021 04:30 (06-06-21 @ 12:52)      MEDICATIONS:  MEDICATIONS  (STANDING):  aspirin  chewable 81 milliGRAM(s) Oral daily  atorvastatin 80 milliGRAM(s) Oral at bedtime  dextrose 40% Gel 15 Gram(s) Oral once  dextrose 5%. 1000 milliLiter(s) (50 mL/Hr) IV Continuous <Continuous>  dextrose 5%. 1000 milliLiter(s) (100 mL/Hr) IV Continuous <Continuous>  dextrose 50% Injectable 25 Gram(s) IV Push once  dextrose 50% Injectable 12.5 Gram(s) IV Push once  dextrose 50% Injectable 25 Gram(s) IV Push once  digoxin     Tablet 125 MICROGram(s) Oral daily  DULoxetine 90 milliGRAM(s) Oral daily  enoxaparin Injectable 40 milliGRAM(s) SubCutaneous daily  gabapentin 600 milliGRAM(s) Oral three times a day  glucagon  Injectable 1 milliGRAM(s) IntraMuscular once  insulin glargine Injectable (LANTUS) 25 Unit(s) SubCutaneous every morning  insulin lispro (ADMELOG) corrective regimen sliding scale   SubCutaneous three times a day before meals  insulin lispro Injectable (ADMELOG) 3 Unit(s) SubCutaneous three times a day before meals  metoprolol succinate ER 25 milliGRAM(s) Oral daily  nafcillin  IVPB 2 Gram(s) IV Intermittent every 4 hours  oxyCODONE  ER Tablet 10 milliGRAM(s) Oral every 12 hours  prasugrel 10 milliGRAM(s) Oral daily  spironolactone 25 milliGRAM(s) Oral daily    MEDICATIONS  (PRN):  acetaminophen   Tablet .. 650 milliGRAM(s) Oral every 8 hours PRN Temp greater or equal to 38C (100.4F), Moderate Pain (4 - 6)  ALPRAZolam 0.5 milliGRAM(s) Oral every 8 hours PRN anxiety  cyclobenzaprine 5 milliGRAM(s) Oral two times a day PRN Muscle Spasm  furosemide    Tablet 40 milliGRAM(s) Oral daily PRN fluid overload  morphine  - Injectable 1 milliGRAM(s) IV Push every 8 hours PRN Severe Pain (7 - 10)  oxyCODONE    IR 10 milliGRAM(s) Oral every 4 hours PRN Moderate Pain (4 - 6)      HOME MEDICATIONS:  ALPRAZolam 0.5 mg oral tablet (04-26)  aspirin 81 mg oral delayed release tablet (04-26)  digoxin 125 mcg (0.125 mg) oral tablet (04-26)  DULoxetine (04-26)  insulin (04-26)  Jardiance 10 mg oral tablet (04-26)  melatonin 10 mg oral tablet (04-26)  metoprolol succinate 25 mg oral tablet, extended release (04-26)  morphine 15 mg oral tablet (04-26)  prasugrel 10 mg oral tablet (04-26)  spironolactone 25 mg oral tablet (04-26)      PHYSICAL EXAM:  GENERAL: NAD, well-groomed, well-developed  EYE: anicteric sclera, non injected conjunctiva, EOMI  ENT: hearing grossly intact, oropharynx clear  PSYCH: no agitation, baseline mentation  NERVOUS SYSTEM:  Alert & Oriented X3, moving all extremities normally   PULMONARY: Clear to auscultation bilaterally; No rales, rhonchi, wheezing, or rubs  CARDIOVASCULAR: Regular rate and rhythm; No murmurs, rubs, or gallops  GI: Soft, Nontender, Nondistended; Bowel sounds present  EXTREMITIES:  2+ Peripheral Pulses, No clubbing, cyanosis, or edema  LYMPH: No lymphadenopathy noted  SKIN: No rashes or lesions

## 2021-06-06 NOTE — DISCHARGE NOTE PROVIDER - CARE PROVIDERS DIRECT ADDRESSES
Cyclosporine Counseling:  I discussed with the patient the risks of cyclosporine including but not limited to hypertension, gingival hyperplasia,myelosuppression, immunosuppression, liver damage, kidney damage, neurotoxicity, lymphoma, and serious infections. The patient understands that monitoring is required including baseline blood pressure, CBC, CMP, lipid panel and uric acid, and then 1-2 times monthly CMP and blood pressure. ,DirectAddress_Unknown

## 2021-06-07 LAB
ALBUMIN SERPL ELPH-MCNC: 2.7 G/DL — LOW (ref 3.5–5.2)
ALP SERPL-CCNC: 83 U/L — SIGNIFICANT CHANGE UP (ref 30–115)
ALT FLD-CCNC: 40 U/L — SIGNIFICANT CHANGE UP (ref 0–41)
ANION GAP SERPL CALC-SCNC: 8 MMOL/L — SIGNIFICANT CHANGE UP (ref 7–14)
AST SERPL-CCNC: 46 U/L — HIGH (ref 0–41)
BILIRUB SERPL-MCNC: 0.6 MG/DL — SIGNIFICANT CHANGE UP (ref 0.2–1.2)
BUN SERPL-MCNC: 20 MG/DL — SIGNIFICANT CHANGE UP (ref 10–20)
CA-I BLD-SCNC: 1.12 MMOL/L — SIGNIFICANT CHANGE UP (ref 1.12–1.3)
CALCIUM SERPL-MCNC: 8.6 MG/DL — SIGNIFICANT CHANGE UP (ref 8.5–10.1)
CHLORIDE SERPL-SCNC: 102 MMOL/L — SIGNIFICANT CHANGE UP (ref 98–110)
CO2 SERPL-SCNC: 27 MMOL/L — SIGNIFICANT CHANGE UP (ref 17–32)
CREAT SERPL-MCNC: 0.9 MG/DL — SIGNIFICANT CHANGE UP (ref 0.7–1.5)
CRP SERPL-MCNC: 43 MG/L — HIGH
ERYTHROCYTE [SEDIMENTATION RATE] IN BLOOD: 81 MM/HR — HIGH (ref 0–10)
GLUCOSE BLDC GLUCOMTR-MCNC: 129 MG/DL — HIGH (ref 70–99)
GLUCOSE BLDC GLUCOMTR-MCNC: 144 MG/DL — HIGH (ref 70–99)
GLUCOSE BLDC GLUCOMTR-MCNC: 154 MG/DL — HIGH (ref 70–99)
GLUCOSE BLDC GLUCOMTR-MCNC: 223 MG/DL — HIGH (ref 70–99)
GLUCOSE SERPL-MCNC: 166 MG/DL — HIGH (ref 70–99)
HCT VFR BLD CALC: 29.3 % — LOW (ref 42–52)
HGB BLD-MCNC: 8.7 G/DL — LOW (ref 14–18)
MAGNESIUM SERPL-MCNC: 1.9 MG/DL — SIGNIFICANT CHANGE UP (ref 1.8–2.4)
MCHC RBC-ENTMCNC: 22.8 PG — LOW (ref 27–31)
MCHC RBC-ENTMCNC: 29.7 G/DL — LOW (ref 32–37)
MCV RBC AUTO: 76.7 FL — LOW (ref 80–94)
NRBC # BLD: 0 /100 WBCS — SIGNIFICANT CHANGE UP (ref 0–0)
PLATELET # BLD AUTO: 507 K/UL — HIGH (ref 130–400)
POTASSIUM SERPL-MCNC: 4.9 MMOL/L — SIGNIFICANT CHANGE UP (ref 3.5–5)
POTASSIUM SERPL-SCNC: 4.9 MMOL/L — SIGNIFICANT CHANGE UP (ref 3.5–5)
PROT SERPL-MCNC: 6.5 G/DL — SIGNIFICANT CHANGE UP (ref 6–8)
RBC # BLD: 3.82 M/UL — LOW (ref 4.7–6.1)
RBC # FLD: 26.1 % — HIGH (ref 11.5–14.5)
SODIUM SERPL-SCNC: 137 MMOL/L — SIGNIFICANT CHANGE UP (ref 135–146)
WBC # BLD: 7.69 K/UL — SIGNIFICANT CHANGE UP (ref 4.8–10.8)
WBC # FLD AUTO: 7.69 K/UL — SIGNIFICANT CHANGE UP (ref 4.8–10.8)

## 2021-06-07 PROCEDURE — 73560 X-RAY EXAM OF KNEE 1 OR 2: CPT | Mod: 26,LT

## 2021-06-07 PROCEDURE — 99233 SBSQ HOSP IP/OBS HIGH 50: CPT

## 2021-06-07 RX ORDER — FERROUS SULFATE 325(65) MG
325 TABLET ORAL DAILY
Refills: 0 | Status: DISCONTINUED | OUTPATIENT
Start: 2021-06-07 | End: 2021-06-15

## 2021-06-07 RX ORDER — PANTOPRAZOLE SODIUM 20 MG/1
40 TABLET, DELAYED RELEASE ORAL
Refills: 0 | Status: DISCONTINUED | OUTPATIENT
Start: 2021-06-07 | End: 2021-06-15

## 2021-06-07 RX ORDER — ALPRAZOLAM 0.25 MG
0.5 TABLET ORAL EVERY 8 HOURS
Refills: 0 | Status: DISCONTINUED | OUTPATIENT
Start: 2021-06-07 | End: 2021-06-11

## 2021-06-07 RX ADMIN — Medication 3 UNIT(S): at 18:20

## 2021-06-07 RX ADMIN — NAFCILLIN 200 GRAM(S): 10 INJECTION, POWDER, FOR SOLUTION INTRAVENOUS at 05:35

## 2021-06-07 RX ADMIN — Medication 2: at 09:00

## 2021-06-07 RX ADMIN — ATORVASTATIN CALCIUM 80 MILLIGRAM(S): 80 TABLET, FILM COATED ORAL at 21:53

## 2021-06-07 RX ADMIN — OXYCODONE HYDROCHLORIDE 10 MILLIGRAM(S): 5 TABLET ORAL at 12:53

## 2021-06-07 RX ADMIN — NAFCILLIN 200 GRAM(S): 10 INJECTION, POWDER, FOR SOLUTION INTRAVENOUS at 21:53

## 2021-06-07 RX ADMIN — GABAPENTIN 600 MILLIGRAM(S): 400 CAPSULE ORAL at 05:23

## 2021-06-07 RX ADMIN — OXYCODONE HYDROCHLORIDE 10 MILLIGRAM(S): 5 TABLET ORAL at 18:18

## 2021-06-07 RX ADMIN — Medication 125 MICROGRAM(S): at 05:23

## 2021-06-07 RX ADMIN — Medication 3 UNIT(S): at 09:00

## 2021-06-07 RX ADMIN — OXYCODONE HYDROCHLORIDE 10 MILLIGRAM(S): 5 TABLET ORAL at 18:17

## 2021-06-07 RX ADMIN — NAFCILLIN 200 GRAM(S): 10 INJECTION, POWDER, FOR SOLUTION INTRAVENOUS at 13:05

## 2021-06-07 RX ADMIN — MORPHINE SULFATE 1 MILLIGRAM(S): 50 CAPSULE, EXTENDED RELEASE ORAL at 05:25

## 2021-06-07 RX ADMIN — MORPHINE SULFATE 1 MILLIGRAM(S): 50 CAPSULE, EXTENDED RELEASE ORAL at 23:14

## 2021-06-07 RX ADMIN — Medication 325 MILLIGRAM(S): at 18:24

## 2021-06-07 RX ADMIN — MORPHINE SULFATE 1 MILLIGRAM(S): 50 CAPSULE, EXTENDED RELEASE ORAL at 01:29

## 2021-06-07 RX ADMIN — GABAPENTIN 600 MILLIGRAM(S): 400 CAPSULE ORAL at 21:53

## 2021-06-07 RX ADMIN — NAFCILLIN 200 GRAM(S): 10 INJECTION, POWDER, FOR SOLUTION INTRAVENOUS at 03:00

## 2021-06-07 RX ADMIN — PRASUGREL 10 MILLIGRAM(S): 5 TABLET, FILM COATED ORAL at 12:23

## 2021-06-07 RX ADMIN — OXYCODONE HYDROCHLORIDE 10 MILLIGRAM(S): 5 TABLET ORAL at 15:13

## 2021-06-07 RX ADMIN — ENOXAPARIN SODIUM 40 MILLIGRAM(S): 100 INJECTION SUBCUTANEOUS at 12:24

## 2021-06-07 RX ADMIN — SPIRONOLACTONE 25 MILLIGRAM(S): 25 TABLET, FILM COATED ORAL at 05:23

## 2021-06-07 RX ADMIN — OXYCODONE HYDROCHLORIDE 10 MILLIGRAM(S): 5 TABLET ORAL at 00:00

## 2021-06-07 RX ADMIN — NAFCILLIN 200 GRAM(S): 10 INJECTION, POWDER, FOR SOLUTION INTRAVENOUS at 18:20

## 2021-06-07 RX ADMIN — Medication 81 MILLIGRAM(S): at 12:23

## 2021-06-07 RX ADMIN — NAFCILLIN 200 GRAM(S): 10 INJECTION, POWDER, FOR SOLUTION INTRAVENOUS at 10:03

## 2021-06-07 RX ADMIN — CYCLOBENZAPRINE HYDROCHLORIDE 5 MILLIGRAM(S): 10 TABLET, FILM COATED ORAL at 19:48

## 2021-06-07 RX ADMIN — GABAPENTIN 600 MILLIGRAM(S): 400 CAPSULE ORAL at 14:52

## 2021-06-07 RX ADMIN — DULOXETINE HYDROCHLORIDE 90 MILLIGRAM(S): 30 CAPSULE, DELAYED RELEASE ORAL at 12:23

## 2021-06-07 RX ADMIN — Medication 3 UNIT(S): at 11:53

## 2021-06-07 RX ADMIN — INSULIN GLARGINE 25 UNIT(S): 100 INJECTION, SOLUTION SUBCUTANEOUS at 09:01

## 2021-06-07 RX ADMIN — OXYCODONE HYDROCHLORIDE 10 MILLIGRAM(S): 5 TABLET ORAL at 05:34

## 2021-06-07 RX ADMIN — Medication 25 MILLIGRAM(S): at 05:23

## 2021-06-07 RX ADMIN — OXYCODONE HYDROCHLORIDE 10 MILLIGRAM(S): 5 TABLET ORAL at 08:35

## 2021-06-07 NOTE — PROGRESS NOTE ADULT - ATTENDING COMMENTS
Patient is a 64yo male admitted w/ L septic knee , c/b MSSA bacteremia. Blood Cx now negative, s/p wash out w/ orthopedics, plan for 6 weeks of IV Nafcillin--> Cefazolin on discharge. PICC line in place.     Pt has had scant biliary drainage from prior per sudhir site, wound cx growing pseudomonas and VRE . LFT's WNL. ID/GI following, no plan for acute intervention, OP follow up for cholecystectomy.     Prior PJI of R Knee, resolved, completed 6 week course of abx, no further orthopedic intervention.     PVD/CAD/Ischemic Cardiomyopathy/HTN/DLD- continue GDMT    DM II well controlled w/ basal bolus regimen     Microcytic Anemia, start iron supplementation       Patient is medically ready for discharge, pending rehab authorization.     I have reviewed and agree with the above resident documentation.     #Progress Note Handoff:  Pending (specify):  Acute Rehab authorization   Family discussion: d/w patient at bedside   Disposition: Home___/SNF___/Other________/Unknown at this time____x____      Krysten Ohara,

## 2021-06-07 NOTE — PROGRESS NOTE ADULT - SUBJECTIVE AND OBJECTIVE BOX
FLOWER MARISCAL 65y Male  MRN#: [id number]   CODE STATUS: FULL      SUBJECTIVE  Patient is a 65y old Male who presents with a chief complaint of Septic arthritis (06 Jun 2021 17:52)    Currently admitted to medicine with the primary diagnosis of Knee pain, left    Today is hospital day 42d, and this morning he is laying in bed comfortably and reports no overnight events. Pt reporting intermittent left knee pain. Otherwise no complaints.    OBJECTIVE  PAST MEDICAL & SURGICAL HISTORY  Status post percutaneous transluminal coronary angioplasty  2 stents    Atherosclerosis of coronary artery  CAD (coronary artery disease)    Osteoarthritis    HLD (hyperlipidemia)    Blood clot due to device, implant, or graft  was on blood thinners    Diabetes  on insulin pump    HTN (hypertension)    CHF (congestive heart failure)  EF ~ 25%    History of celiac disease    Diverticulitis    STEMI (ST elevation myocardial infarction)    Diabetic neuropathy    Anxiety and depression    Other postprocedural status  Fixation hardware in foot LEFT    Stented coronary artery  10/18 heart attack  INFERIOR WALL MI    S/P CABG x 1  2018    S/P TKR (total knee replacement), right  with infection Mrsa   per pt he was cleared from MRSA infection    Surgery, elective  right knee wound debridement    History of open reduction and internal fixation (ORIF) procedure    H/O shoulder surgery  right    AICD (automatic cardioverter/defibrillator) present    Cholecystostomy care  drain inserted 2020 &amp; removed 4 months ago    History of tonsillectomy    History of hip replacement, total, right      ALLERGIES:  ACE inhibitors (Hives)  carvedilol (Other)  enalapril (Hives)  Entresto (Other)    MEDICATIONS:  STANDING MEDICATIONS  aspirin  chewable 81 milliGRAM(s) Oral daily  atorvastatin 80 milliGRAM(s) Oral at bedtime  dextrose 40% Gel 15 Gram(s) Oral once  dextrose 5%. 1000 milliLiter(s) (50 mL/Hr) IV Continuous <Continuous>  dextrose 5%. 1000 milliLiter(s) (100 mL/Hr) IV Continuous <Continuous>  dextrose 50% Injectable 25 Gram(s) IV Push once  dextrose 50% Injectable 12.5 Gram(s) IV Push once  dextrose 50% Injectable 25 Gram(s) IV Push once  digoxin     Tablet 125 MICROGram(s) Oral daily  DULoxetine 90 milliGRAM(s) Oral daily  enoxaparin Injectable 40 milliGRAM(s) SubCutaneous daily  gabapentin 600 milliGRAM(s) Oral three times a day  glucagon  Injectable 1 milliGRAM(s) IntraMuscular once  insulin glargine Injectable (LANTUS) 25 Unit(s) SubCutaneous every morning  insulin lispro (ADMELOG) corrective regimen sliding scale   SubCutaneous three times a day before meals  insulin lispro Injectable (ADMELOG) 3 Unit(s) SubCutaneous three times a day before meals  metoprolol succinate ER 25 milliGRAM(s) Oral daily  nafcillin  IVPB 2 Gram(s) IV Intermittent every 4 hours  oxyCODONE  ER Tablet 10 milliGRAM(s) Oral every 12 hours  prasugrel 10 milliGRAM(s) Oral daily  spironolactone 25 milliGRAM(s) Oral daily    PRN MEDICATIONS  acetaminophen   Tablet .. 650 milliGRAM(s) Oral every 8 hours PRN  ALPRAZolam 0.5 milliGRAM(s) Oral every 8 hours PRN  cyclobenzaprine 5 milliGRAM(s) Oral two times a day PRN  furosemide    Tablet 40 milliGRAM(s) Oral daily PRN  morphine  - Injectable 1 milliGRAM(s) IV Push every 8 hours PRN  ondansetron Injectable 4 milliGRAM(s) IV Push three times a day PRN  oxyCODONE    IR 10 milliGRAM(s) Oral every 4 hours PRN      VITAL SIGNS: Last 24 Hours  T(C): 35.5 (07 Jun 2021 13:22), Max: 36.2 (06 Jun 2021 21:09)  T(F): 95.9 (07 Jun 2021 13:22), Max: 97.1 (06 Jun 2021 21:09)  HR: 85 (07 Jun 2021 13:22) (72 - 85)  BP: 102/56 (07 Jun 2021 13:22) (102/56 - 116/64)  BP(mean): --  RR: 18 (07 Jun 2021 13:22) (18 - 20)  SpO2: --    LABS:                        8.7    7.69  )-----------( 507      ( 07 Jun 2021 07:45 )             29.3     06-07    137  |  102  |  20  ----------------------------<  166<H>  4.9   |  27  |  0.9    Ca    8.6      07 Jun 2021 07:45  Mg     1.9     06-07    TPro  6.5  /  Alb  2.7<L>  /  TBili  0.6  /  DBili  x   /  AST  46<H>  /  ALT  40  /  AlkPhos  83  06-07      Sedimentation Rate, Erythrocyte: 81 mm/Hr *H* (06-07-21 @ 07:45)      RADIOLOGY:    no new imaging     PHYSICAL EXAM:    GENERAL: NAD, well-developed, AAOx3  HEENT:  Atraumatic, Normocephalic. EOMI, conjunctiva and sclera clear, No JVD  PULMONARY: Clear to auscultation bilaterally; No wheeze  CARDIOVASCULAR: Regular rate and rhythm; No murmurs, rubs, or gallops  GASTROINTESTINAL: Soft, Nontender, Nondistended; Bowel sounds present  MUSCULOSKELETAL: no cyanosis or edema. L knee with bandage, clean and dry; no discharge, swelling, or erythema. R knee with bandage for chronic wound, clean and dry.  NEUROLOGY: non-focal  SKIN: No rashes or lesions      ASSESSMENT & PLAN    64 yo male admitted with left septic knee and severe PVD    # Left Knee Septic Arthritis with MSSA bacteremia- resolving  - repeat Blood Cx negative   - S/P left knee wash out by ortho  - Ortho following; recs appreciated          Left knee lateral drain removed (6/1), Medial knee drain and occlusive dressing removed on 6/3  - ID on board; currently on continue nafcillin 2g q 4 hours, will need 6 weeks from time of last debridement (5/27-7/7) -- to finish with cefazolin 2g q 8 hours  - PICC line placed on 6/4    #Biliary Drainage from previous perc sudhir site- resolved   - Previous Intraabdominal Cx 9/4/2020 -- VRE faecium and pseudomonas aeruginosa  - Wound Cx 5/1 growing Pseudomonas/VRE Faecium  - CT Abdomen and Pelvis w/ IV Cont (04.26.21 @ 04:42): No evidence of acute intra-abdominal pathology. Decreased area of right upper quadrant subcutaneous stranding at site of prior percutaneous cholecystostomy, without evidence of abscess  - HIDA scan unable to visualize contrast in GB   - spoke w/ Dr. Tello 5/3 after HIDA who recommended outpatient ERCP and cholecystectomy to divert bile and heal fistula  - At this time RUQ tract appears to be closed and well healed w/o active drainage   - LFT's unremarkable   - spoke w/ Dr. 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  - No further intervention per Ortho     # PVD  - Arterial doppler 5/29 of bilateral LE show normal arterial flow in the bilateral lower extremities. Limited exam due to bandages  - Vascular Cardiology team following, no further intervention   c/w ASA and Effient and high intensity statin   c/w GDMT for HFrEF     # DM2 c/b hyperglycemia- well controlled  - increase Lantus to 25u qam   - cont lispro 3 tid w/ meals      #Microcytic Anemia   - Iron def + ACD?, no evidence of GI bleed  - will start iron supplementation  - o/p GI f/u    #CAD s/p PCI to RCA and OM1, s/p LIMA to LAD (2018)   #Severe ischemic cardiomyopathy w/ reduced EF s/p CRT-D   #HTN   #DLD   - JOHN PAUL 5/2021 w/ ef 20-25%  - on ASA and Effient   - restarted on high intensity statin  - c/w GDMT (Toprol, Aldactone, Digoxn, Lasix prn ) for HFrEF     # Covid vaccine status:   He has received one dose and is supposed to receive the second dose on 5/15/21 but he was already hospitalized. Can receive 2nd dose anytime after leaving hospital.    # DVT ppx: Lovenox  # GI ppx: PPI from home  # Diet: DASH  # Code Status: Full Code

## 2021-06-08 LAB
ALBUMIN SERPL ELPH-MCNC: 2.9 G/DL — LOW (ref 3.5–5.2)
ALP SERPL-CCNC: 99 U/L — SIGNIFICANT CHANGE UP (ref 30–115)
ALT FLD-CCNC: 48 U/L — HIGH (ref 0–41)
ANION GAP SERPL CALC-SCNC: 10 MMOL/L — SIGNIFICANT CHANGE UP (ref 7–14)
AST SERPL-CCNC: 43 U/L — HIGH (ref 0–41)
BASOPHILS # BLD AUTO: 0.07 K/UL — SIGNIFICANT CHANGE UP (ref 0–0.2)
BASOPHILS NFR BLD AUTO: 0.8 % — SIGNIFICANT CHANGE UP (ref 0–1)
BILIRUB SERPL-MCNC: 0.5 MG/DL — SIGNIFICANT CHANGE UP (ref 0.2–1.2)
BUN SERPL-MCNC: 22 MG/DL — HIGH (ref 10–20)
CALCIUM SERPL-MCNC: 8 MG/DL — LOW (ref 8.5–10.1)
CHLORIDE SERPL-SCNC: 101 MMOL/L — SIGNIFICANT CHANGE UP (ref 98–110)
CO2 SERPL-SCNC: 26 MMOL/L — SIGNIFICANT CHANGE UP (ref 17–32)
CREAT SERPL-MCNC: 0.9 MG/DL — SIGNIFICANT CHANGE UP (ref 0.7–1.5)
EOSINOPHIL # BLD AUTO: 0.48 K/UL — SIGNIFICANT CHANGE UP (ref 0–0.7)
EOSINOPHIL NFR BLD AUTO: 5.6 % — SIGNIFICANT CHANGE UP (ref 0–8)
GLUCOSE BLDC GLUCOMTR-MCNC: 105 MG/DL — HIGH (ref 70–99)
GLUCOSE BLDC GLUCOMTR-MCNC: 110 MG/DL — HIGH (ref 70–99)
GLUCOSE BLDC GLUCOMTR-MCNC: 157 MG/DL — HIGH (ref 70–99)
GLUCOSE SERPL-MCNC: 128 MG/DL — HIGH (ref 70–99)
HCT VFR BLD CALC: 29.6 % — LOW (ref 42–52)
HGB BLD-MCNC: 8.6 G/DL — LOW (ref 14–18)
IMM GRANULOCYTES NFR BLD AUTO: 0.3 % — SIGNIFICANT CHANGE UP (ref 0.1–0.3)
LYMPHOCYTES # BLD AUTO: 1.1 K/UL — LOW (ref 1.2–3.4)
LYMPHOCYTES # BLD AUTO: 12.8 % — LOW (ref 20.5–51.1)
MAGNESIUM SERPL-MCNC: 2 MG/DL — SIGNIFICANT CHANGE UP (ref 1.8–2.4)
MCHC RBC-ENTMCNC: 22.6 PG — LOW (ref 27–31)
MCHC RBC-ENTMCNC: 29.1 G/DL — LOW (ref 32–37)
MCV RBC AUTO: 77.7 FL — LOW (ref 80–94)
MONOCYTES # BLD AUTO: 0.78 K/UL — HIGH (ref 0.1–0.6)
MONOCYTES NFR BLD AUTO: 9.1 % — SIGNIFICANT CHANGE UP (ref 1.7–9.3)
NEUTROPHILS # BLD AUTO: 6.14 K/UL — SIGNIFICANT CHANGE UP (ref 1.4–6.5)
NEUTROPHILS NFR BLD AUTO: 71.4 % — SIGNIFICANT CHANGE UP (ref 42.2–75.2)
NRBC # BLD: 0 /100 WBCS — SIGNIFICANT CHANGE UP (ref 0–0)
PHOSPHATE SERPL-MCNC: 3.9 MG/DL — SIGNIFICANT CHANGE UP (ref 2.1–4.9)
PLATELET # BLD AUTO: 575 K/UL — HIGH (ref 130–400)
POTASSIUM SERPL-MCNC: 5.2 MMOL/L — HIGH (ref 3.5–5)
POTASSIUM SERPL-SCNC: 5.2 MMOL/L — HIGH (ref 3.5–5)
PROT SERPL-MCNC: 6.9 G/DL — SIGNIFICANT CHANGE UP (ref 6–8)
RBC # BLD: 3.81 M/UL — LOW (ref 4.7–6.1)
RBC # FLD: 26 % — HIGH (ref 11.5–14.5)
SODIUM SERPL-SCNC: 137 MMOL/L — SIGNIFICANT CHANGE UP (ref 135–146)
WBC # BLD: 8.6 K/UL — SIGNIFICANT CHANGE UP (ref 4.8–10.8)
WBC # FLD AUTO: 8.6 K/UL — SIGNIFICANT CHANGE UP (ref 4.8–10.8)

## 2021-06-08 PROCEDURE — 99233 SBSQ HOSP IP/OBS HIGH 50: CPT

## 2021-06-08 RX ORDER — ACETAMINOPHEN 500 MG
1000 TABLET ORAL EVERY 8 HOURS
Refills: 0 | Status: DISCONTINUED | OUTPATIENT
Start: 2021-06-08 | End: 2021-06-15

## 2021-06-08 RX ORDER — OXYCODONE HYDROCHLORIDE 5 MG/1
10 TABLET ORAL EVERY 4 HOURS
Refills: 0 | Status: DISCONTINUED | OUTPATIENT
Start: 2021-06-08 | End: 2021-06-15

## 2021-06-08 RX ORDER — SODIUM ZIRCONIUM CYCLOSILICATE 10 G/10G
5 POWDER, FOR SUSPENSION ORAL ONCE
Refills: 0 | Status: COMPLETED | OUTPATIENT
Start: 2021-06-08 | End: 2021-06-08

## 2021-06-08 RX ORDER — BACLOFEN 100 %
10 POWDER (GRAM) MISCELLANEOUS THREE TIMES A DAY
Refills: 0 | Status: DISCONTINUED | OUTPATIENT
Start: 2021-06-08 | End: 2021-06-15

## 2021-06-08 RX ORDER — MORPHINE SULFATE 50 MG/1
1 CAPSULE, EXTENDED RELEASE ORAL EVERY 8 HOURS
Refills: 0 | Status: DISCONTINUED | OUTPATIENT
Start: 2021-06-08 | End: 2021-06-09

## 2021-06-08 RX ORDER — OXYCODONE HYDROCHLORIDE 5 MG/1
20 TABLET ORAL EVERY 12 HOURS
Refills: 0 | Status: DISCONTINUED | OUTPATIENT
Start: 2021-06-08 | End: 2021-06-15

## 2021-06-08 RX ADMIN — OXYCODONE HYDROCHLORIDE 10 MILLIGRAM(S): 5 TABLET ORAL at 01:47

## 2021-06-08 RX ADMIN — GABAPENTIN 600 MILLIGRAM(S): 400 CAPSULE ORAL at 06:13

## 2021-06-08 RX ADMIN — Medication 3 UNIT(S): at 11:13

## 2021-06-08 RX ADMIN — NAFCILLIN 200 GRAM(S): 10 INJECTION, POWDER, FOR SOLUTION INTRAVENOUS at 01:46

## 2021-06-08 RX ADMIN — NAFCILLIN 200 GRAM(S): 10 INJECTION, POWDER, FOR SOLUTION INTRAVENOUS at 09:58

## 2021-06-08 RX ADMIN — Medication 2: at 17:31

## 2021-06-08 RX ADMIN — Medication 1000 MILLIGRAM(S): at 23:03

## 2021-06-08 RX ADMIN — MORPHINE SULFATE 1 MILLIGRAM(S): 50 CAPSULE, EXTENDED RELEASE ORAL at 10:25

## 2021-06-08 RX ADMIN — OXYCODONE HYDROCHLORIDE 10 MILLIGRAM(S): 5 TABLET ORAL at 09:00

## 2021-06-08 RX ADMIN — ATORVASTATIN CALCIUM 80 MILLIGRAM(S): 80 TABLET, FILM COATED ORAL at 21:02

## 2021-06-08 RX ADMIN — SPIRONOLACTONE 25 MILLIGRAM(S): 25 TABLET, FILM COATED ORAL at 06:13

## 2021-06-08 RX ADMIN — Medication 1000 MILLIGRAM(S): at 21:18

## 2021-06-08 RX ADMIN — NAFCILLIN 200 GRAM(S): 10 INJECTION, POWDER, FOR SOLUTION INTRAVENOUS at 06:11

## 2021-06-08 RX ADMIN — NAFCILLIN 200 GRAM(S): 10 INJECTION, POWDER, FOR SOLUTION INTRAVENOUS at 19:49

## 2021-06-08 RX ADMIN — OXYCODONE HYDROCHLORIDE 10 MILLIGRAM(S): 5 TABLET ORAL at 20:55

## 2021-06-08 RX ADMIN — Medication 125 MICROGRAM(S): at 06:13

## 2021-06-08 RX ADMIN — Medication 81 MILLIGRAM(S): at 11:17

## 2021-06-08 RX ADMIN — Medication 25 MILLIGRAM(S): at 06:13

## 2021-06-08 RX ADMIN — CYCLOBENZAPRINE HYDROCHLORIDE 5 MILLIGRAM(S): 10 TABLET, FILM COATED ORAL at 10:57

## 2021-06-08 RX ADMIN — SODIUM ZIRCONIUM CYCLOSILICATE 5 GRAM(S): 10 POWDER, FOR SUSPENSION ORAL at 15:12

## 2021-06-08 RX ADMIN — NAFCILLIN 200 GRAM(S): 10 INJECTION, POWDER, FOR SOLUTION INTRAVENOUS at 14:01

## 2021-06-08 RX ADMIN — ENOXAPARIN SODIUM 40 MILLIGRAM(S): 100 INJECTION SUBCUTANEOUS at 11:17

## 2021-06-08 RX ADMIN — GABAPENTIN 600 MILLIGRAM(S): 400 CAPSULE ORAL at 21:19

## 2021-06-08 RX ADMIN — OXYCODONE HYDROCHLORIDE 10 MILLIGRAM(S): 5 TABLET ORAL at 06:14

## 2021-06-08 RX ADMIN — PANTOPRAZOLE SODIUM 40 MILLIGRAM(S): 20 TABLET, DELAYED RELEASE ORAL at 06:13

## 2021-06-08 RX ADMIN — INSULIN GLARGINE 25 UNIT(S): 100 INJECTION, SOLUTION SUBCUTANEOUS at 08:20

## 2021-06-08 RX ADMIN — OXYCODONE HYDROCHLORIDE 20 MILLIGRAM(S): 5 TABLET ORAL at 18:14

## 2021-06-08 RX ADMIN — MORPHINE SULFATE 1 MILLIGRAM(S): 50 CAPSULE, EXTENDED RELEASE ORAL at 14:02

## 2021-06-08 RX ADMIN — Medication 325 MILLIGRAM(S): at 11:17

## 2021-06-08 RX ADMIN — Medication 3 UNIT(S): at 17:32

## 2021-06-08 RX ADMIN — OXYCODONE HYDROCHLORIDE 10 MILLIGRAM(S): 5 TABLET ORAL at 22:00

## 2021-06-08 RX ADMIN — OXYCODONE HYDROCHLORIDE 10 MILLIGRAM(S): 5 TABLET ORAL at 14:20

## 2021-06-08 RX ADMIN — GABAPENTIN 600 MILLIGRAM(S): 400 CAPSULE ORAL at 14:00

## 2021-06-08 RX ADMIN — Medication 4: at 08:19

## 2021-06-08 RX ADMIN — PRASUGREL 10 MILLIGRAM(S): 5 TABLET, FILM COATED ORAL at 11:16

## 2021-06-08 RX ADMIN — NAFCILLIN 200 GRAM(S): 10 INJECTION, POWDER, FOR SOLUTION INTRAVENOUS at 21:46

## 2021-06-08 RX ADMIN — OXYCODONE HYDROCHLORIDE 10 MILLIGRAM(S): 5 TABLET ORAL at 06:48

## 2021-06-08 RX ADMIN — Medication 3 UNIT(S): at 08:21

## 2021-06-08 RX ADMIN — MORPHINE SULFATE 1 MILLIGRAM(S): 50 CAPSULE, EXTENDED RELEASE ORAL at 00:34

## 2021-06-08 RX ADMIN — DULOXETINE HYDROCHLORIDE 90 MILLIGRAM(S): 30 CAPSULE, DELAYED RELEASE ORAL at 11:16

## 2021-06-08 NOTE — PROGRESS NOTE ADULT - ASSESSMENT
Labs/ vitals reviewed  Improvement in ESR/ CRP noted  Will repeat to R/O lab error  Will follow  Pt. needs improved pain control

## 2021-06-08 NOTE — PROGRESS NOTE ADULT - ATTENDING SUPERVISION STATEMENT
Resident
Resident
ACP
ACP
Resident
ACP
Resident

## 2021-06-08 NOTE — CHART NOTE - NSCHARTNOTEFT_GEN_A_CORE
Consult for pain management had already been placed.  Recs were placed in consult and subsequent progress note  Spoke with resident to place recs now so can assess for response in follow up tomorrow.

## 2021-06-08 NOTE — PROGRESS NOTE ADULT - SUBJECTIVE AND OBJECTIVE BOX
Pt. seen/ examined  Reported sudden increase in pain after PT yesterday  No acute Fx on XR  Pain medial to patella  No erythema, drainage

## 2021-06-08 NOTE — PROGRESS NOTE ADULT - ATTENDING COMMENTS
Patient is a 66yo male admitted w/ L septic knee , c/b MSSA bacteremia. Blood Cx now negative, s/p wash out w/ orthopedics, plan for 6 weeks of IV Nafcillin--> Cefazolin on discharge. PICC line in place.     Pt has had scant biliary drainage from prior per sudhir site, wound cx growing pseudomonas and VRE . LFT's WNL. ID/GI following, no plan for acute intervention, OP follow up for cholecystectomy.     Prior PJI of R Knee, resolved, completed 6 week course of abx, no further orthopedic intervention.     PVD/CAD/Ischemic Cardiomyopathy/HTN/DLD- continue GDMT    DM II well controlled w/ basal bolus regimen     Microcytic Anemia, start iron supplementation       Patient is medically ready for discharge, pending rehab authorization.     I have reviewed and agree with the above resident documentation.     #Progress Note Handoff:  Pending (specify):  Acute Rehab authorization   Family discussion: d/w patient at bedside   Disposition: Home___/SNF___/Other________/Unknown at this time____x____      Krysten Ohara DO . Patient is a 66yo male admitted w/ L septic knee , c/b MSSA bacteremia. Blood Cx now negative, s/p wash out w/ orthopedics, plan for 6 weeks of IV Nafcillin--> Cefazolin on discharge. PICC line in place.     Pt has had scant biliary drainage from prior per sudhir site, wound cx growing pseudomonas and VRE . LFT's WNL. ID/GI following, no plan for acute intervention, OP follow up for cholecystectomy.     Prior PJI of R Knee, resolved, completed 6 week course of abx, no further orthopedic intervention.     PVD/CAD/Ischemic Cardiomyopathy/HTN/DLD- continue GDMT    DM II well controlled w/ basal bolus regimen     Microcytic Anemia, start iron supplementation     Persistent Hyperkalemia- likely related to Aldactone, will dc. Pt will need OP Cards f/u if it needs to be resumed       Patient is medically ready for discharge, pending rehab authorization.     I have reviewed and agree with the above resident documentation.     #Progress Note Handoff:  Pending (specify):  Acute Rehab authorization   Family discussion: d/w patient at bedside   Disposition: Home___/SNF___/Other________/Unknown at this time____x____      Krysten Ohara DO .

## 2021-06-08 NOTE — PROGRESS NOTE ADULT - SUBJECTIVE AND OBJECTIVE BOX
FLOWER MARISCAL 65y Male  MRN#: [id number]   CODE STATUS: FULL      SUBJECTIVE  Patient is a 65y old Male who presents with a chief complaint of Septic arthritis (07 Jun 2021 15:28)    Currently admitted to medicine with the primary diagnosis of Knee pain, left    Today is hospital day 43d, and this morning he is laying in bed comfortably and reports no overnight events. Still reporting left knee pain. Otherwise no complaints.    OBJECTIVE  PAST MEDICAL & SURGICAL HISTORY  Status post percutaneous transluminal coronary angioplasty  2 stents    Atherosclerosis of coronary artery  CAD (coronary artery disease)    Osteoarthritis    HLD (hyperlipidemia)    Blood clot due to device, implant, or graft  was on blood thinners    Diabetes  on insulin pump    HTN (hypertension)    CHF (congestive heart failure)  EF ~ 25%    History of celiac disease    Diverticulitis    STEMI (ST elevation myocardial infarction)    Diabetic neuropathy    Anxiety and depression    Other postprocedural status  Fixation hardware in foot LEFT    Stented coronary artery  10/18 heart attack  INFERIOR WALL MI    S/P CABG x 1  2018    S/P TKR (total knee replacement), right  with infection Mrsa   per pt he was cleared from MRSA infection    Surgery, elective  right knee wound debridement    History of open reduction and internal fixation (ORIF) procedure    H/O shoulder surgery  right    AICD (automatic cardioverter/defibrillator) present    Cholecystostomy care  drain inserted 2020 &amp; removed 4 months ago    History of tonsillectomy    History of hip replacement, total, right      ALLERGIES:  ACE inhibitors (Hives)  carvedilol (Other)  enalapril (Hives)  Entresto (Other)    MEDICATIONS:  STANDING MEDICATIONS  aspirin  chewable 81 milliGRAM(s) Oral daily  atorvastatin 80 milliGRAM(s) Oral at bedtime  dextrose 40% Gel 15 Gram(s) Oral once  dextrose 5%. 1000 milliLiter(s) (50 mL/Hr) IV Continuous <Continuous>  dextrose 5%. 1000 milliLiter(s) (100 mL/Hr) IV Continuous <Continuous>  dextrose 50% Injectable 25 Gram(s) IV Push once  dextrose 50% Injectable 12.5 Gram(s) IV Push once  dextrose 50% Injectable 25 Gram(s) IV Push once  digoxin     Tablet 125 MICROGram(s) Oral daily  DULoxetine 90 milliGRAM(s) Oral daily  enoxaparin Injectable 40 milliGRAM(s) SubCutaneous daily  ferrous    sulfate 325 milliGRAM(s) Oral daily  gabapentin 600 milliGRAM(s) Oral three times a day  glucagon  Injectable 1 milliGRAM(s) IntraMuscular once  insulin glargine Injectable (LANTUS) 25 Unit(s) SubCutaneous every morning  insulin lispro (ADMELOG) corrective regimen sliding scale   SubCutaneous three times a day before meals  insulin lispro Injectable (ADMELOG) 3 Unit(s) SubCutaneous three times a day before meals  metoprolol succinate ER 25 milliGRAM(s) Oral daily  nafcillin  IVPB 2 Gram(s) IV Intermittent every 4 hours  oxyCODONE  ER Tablet 10 milliGRAM(s) Oral every 12 hours  pantoprazole    Tablet 40 milliGRAM(s) Oral before breakfast  prasugrel 10 milliGRAM(s) Oral daily  spironolactone 25 milliGRAM(s) Oral daily    PRN MEDICATIONS  acetaminophen   Tablet .. 650 milliGRAM(s) Oral every 8 hours PRN  ALPRAZolam 0.5 milliGRAM(s) Oral every 8 hours PRN  cyclobenzaprine 5 milliGRAM(s) Oral two times a day PRN  furosemide    Tablet 40 milliGRAM(s) Oral daily PRN  morphine  - Injectable 1 milliGRAM(s) IV Push every 8 hours PRN  ondansetron Injectable 4 milliGRAM(s) IV Push three times a day PRN  oxyCODONE    IR 10 milliGRAM(s) Oral every 4 hours PRN      VITAL SIGNS: Last 24 Hours  T(C): 35.6 (08 Jun 2021 05:49), Max: 36.6 (07 Jun 2021 21:30)  T(F): 96.1 (08 Jun 2021 05:49), Max: 97.9 (07 Jun 2021 21:30)  HR: 80 (08 Jun 2021 05:49) (80 - 85)  BP: 110/64 (08 Jun 2021 05:49) (102/56 - 112/65)  BP(mean): --  RR: 18 (08 Jun 2021 05:49) (18 - 18)  SpO2: --      LABS:                        8.6    8.60  )-----------( 575      ( 08 Jun 2021 05:35 )             29.6     06-08    137  |  101  |  22<H>  ----------------------------<  128<H>  5.2<H>   |  26  |  0.9    Ca    8.0<L>      08 Jun 2021 05:35  Phos  3.9     06-08  Mg     2.0     06-08    TPro  6.9  /  Alb  2.9<L>  /  TBili  0.5  /  DBili  x   /  AST  43<H>  /  ALT  48<H>  /  AlkPhos  99  06-08        RADIOLOGY:    < from: Xray Knee 1 or 2 Views, Left (06.07.21 @ 20:40) >  IMPRESSION:    Erosions throughout the knee joint consistent with septic arthritis/osteomyelitis. Findings are grossly unchanged from previous MRI. Antibiotic coated beads in the suprapatellar joint space. Diffuse soft tissue swelling.    < end of copied text >      PHYSICAL EXAM:    GENERAL: NAD, well-developed, AAOx3  HEENT:  Atraumatic, Normocephalic. EOMI, conjunctiva and sclera clear, No JVD  PULMONARY: Clear to auscultation bilaterally; No wheeze  CARDIOVASCULAR: Regular rate and rhythm; No murmurs, rubs, or gallops  GASTROINTESTINAL: Soft, Nontender, Nondistended; Bowel sounds present  MUSCULOSKELETAL: no cyanosis or edema. L knee with bandage, clean and dry; no discharge, swelling, or erythema. R knee with bandage for chronic wound, clean and dry.  NEUROLOGY: non-focal  SKIN: No rashes or lesions      ASSESSMENT & PLAN    64 yo male admitted with left septic knee and severe PVD    # Left Knee Septic Arthritis with MSSA bacteremia- resolving  - repeat Blood Cx negative   - S/P left knee wash out by ortho  - Ortho following; recalled yesterday for L knee pain; knee xray as above, grossly unchanged from prior MRI. Will see pt again today          Left knee lateral drain removed (6/1), Medial knee drain and occlusive dressing removed on 6/3  - ID on board; currently on continue nafcillin 2g q 4 hours, will need 6 weeks from time of last debridement (5/27-7/7) -- to finish with cefazolin 2g q 8 hours  - PICC line placed on 6/4  - pain management recalled for left knee pain    #Biliary Drainage from previous perc sudhir site- resolved   - Previous Intraabdominal Cx 9/4/2020 -- VRE faecium and pseudomonas aeruginosa  - Wound Cx 5/1 growing Pseudomonas/VRE Faecium  - CT Abdomen and Pelvis w/ IV Cont (04.26.21 @ 04:42): No evidence of acute intra-abdominal pathology. Decreased area of right upper quadrant subcutaneous stranding at site of prior percutaneous cholecystostomy, without evidence of abscess  - HIDA scan unable to visualize contrast in GB   - spoke w/ Dr. Tello 5/3 after HIDA who recommended outpatient ERCP and cholecystectomy to divert bile and heal fistula  - At this time RUQ tract appears to be closed and well healed w/o active drainage   - LFT's unremarkable   - spoke w/ Dr. 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  - No further intervention per Ortho     # PVD  - Arterial doppler 5/29 of bilateral LE show normal arterial flow in the bilateral lower extremities. Limited exam due to bandages  - Vascular Cardiology team following, no further intervention   c/w ASA and Effient and high intensity statin   c/w GDMT for HFrEF     # DM2 c/b hyperglycemia- well controlled  - Lantus 25u in the morning  - cont lispro 3 tid w/ meals      #Microcytic Anemia   - Iron def + ACD?, no evidence of GI bleed  - will start iron supplementation  - o/p GI f/u    #CAD s/p PCI to RCA and OM1, s/p LIMA to LAD (2018)   #Severe ischemic cardiomyopathy w/ reduced EF s/p CRT-D   #HTN   #DLD   - JOHN PAUL 5/2021 w/ ef 20-25%  - on ASA and Effient   - restarted on high intensity statin  - c/w GDMT (Toprol, Aldactone, Digoxn, Lasix prn ) for HFrEF     # Covid vaccine status:   He has received one dose and is supposed to receive the second dose on 5/15/21 but he was already hospitalized. Can receive 2nd dose anytime after leaving hospital.    # DVT ppx: Lovenox  # GI ppx: PPI from home  # Diet: DASH  # Code Status: Full Code  # Dispo: awaiting auth for STR, anticipated for tomorrow

## 2021-06-09 ENCOUNTER — TRANSCRIPTION ENCOUNTER (OUTPATIENT)
Age: 66
End: 2021-06-09

## 2021-06-09 LAB
GLUCOSE BLDC GLUCOMTR-MCNC: 101 MG/DL — HIGH (ref 70–99)
GLUCOSE BLDC GLUCOMTR-MCNC: 112 MG/DL — HIGH (ref 70–99)
GLUCOSE BLDC GLUCOMTR-MCNC: 120 MG/DL — HIGH (ref 70–99)
GLUCOSE BLDC GLUCOMTR-MCNC: 98 MG/DL — SIGNIFICANT CHANGE UP (ref 70–99)
GLUCOSE BLDC GLUCOMTR-MCNC: 99 MG/DL — SIGNIFICANT CHANGE UP (ref 70–99)
SARS-COV-2 RNA SPEC QL NAA+PROBE: SIGNIFICANT CHANGE UP

## 2021-06-09 PROCEDURE — 99233 SBSQ HOSP IP/OBS HIGH 50: CPT

## 2021-06-09 RX ADMIN — Medication 1000 MILLIGRAM(S): at 05:27

## 2021-06-09 RX ADMIN — Medication 125 MICROGRAM(S): at 05:28

## 2021-06-09 RX ADMIN — DULOXETINE HYDROCHLORIDE 90 MILLIGRAM(S): 30 CAPSULE, DELAYED RELEASE ORAL at 12:00

## 2021-06-09 RX ADMIN — Medication 325 MILLIGRAM(S): at 12:00

## 2021-06-09 RX ADMIN — Medication 1000 MILLIGRAM(S): at 09:18

## 2021-06-09 RX ADMIN — NAFCILLIN 200 GRAM(S): 10 INJECTION, POWDER, FOR SOLUTION INTRAVENOUS at 20:04

## 2021-06-09 RX ADMIN — OXYCODONE HYDROCHLORIDE 10 MILLIGRAM(S): 5 TABLET ORAL at 23:49

## 2021-06-09 RX ADMIN — GABAPENTIN 600 MILLIGRAM(S): 400 CAPSULE ORAL at 21:17

## 2021-06-09 RX ADMIN — NAFCILLIN 200 GRAM(S): 10 INJECTION, POWDER, FOR SOLUTION INTRAVENOUS at 02:37

## 2021-06-09 RX ADMIN — NAFCILLIN 200 GRAM(S): 10 INJECTION, POWDER, FOR SOLUTION INTRAVENOUS at 23:31

## 2021-06-09 RX ADMIN — NAFCILLIN 200 GRAM(S): 10 INJECTION, POWDER, FOR SOLUTION INTRAVENOUS at 09:29

## 2021-06-09 RX ADMIN — INSULIN GLARGINE 25 UNIT(S): 100 INJECTION, SOLUTION SUBCUTANEOUS at 09:31

## 2021-06-09 RX ADMIN — NAFCILLIN 200 GRAM(S): 10 INJECTION, POWDER, FOR SOLUTION INTRAVENOUS at 05:28

## 2021-06-09 RX ADMIN — Medication 25 MILLIGRAM(S): at 05:28

## 2021-06-09 RX ADMIN — GABAPENTIN 600 MILLIGRAM(S): 400 CAPSULE ORAL at 14:37

## 2021-06-09 RX ADMIN — Medication 0.5 MILLIGRAM(S): at 21:25

## 2021-06-09 RX ADMIN — NAFCILLIN 200 GRAM(S): 10 INJECTION, POWDER, FOR SOLUTION INTRAVENOUS at 14:37

## 2021-06-09 RX ADMIN — Medication 81 MILLIGRAM(S): at 12:00

## 2021-06-09 RX ADMIN — OXYCODONE HYDROCHLORIDE 20 MILLIGRAM(S): 5 TABLET ORAL at 05:36

## 2021-06-09 RX ADMIN — Medication 3 UNIT(S): at 16:50

## 2021-06-09 RX ADMIN — Medication 1000 MILLIGRAM(S): at 23:32

## 2021-06-09 RX ADMIN — OXYCODONE HYDROCHLORIDE 20 MILLIGRAM(S): 5 TABLET ORAL at 18:12

## 2021-06-09 RX ADMIN — Medication 1000 MILLIGRAM(S): at 21:17

## 2021-06-09 RX ADMIN — Medication 3 UNIT(S): at 11:58

## 2021-06-09 RX ADMIN — ENOXAPARIN SODIUM 40 MILLIGRAM(S): 100 INJECTION SUBCUTANEOUS at 12:00

## 2021-06-09 RX ADMIN — Medication 3 UNIT(S): at 09:30

## 2021-06-09 RX ADMIN — GABAPENTIN 600 MILLIGRAM(S): 400 CAPSULE ORAL at 05:28

## 2021-06-09 RX ADMIN — ATORVASTATIN CALCIUM 80 MILLIGRAM(S): 80 TABLET, FILM COATED ORAL at 21:17

## 2021-06-09 RX ADMIN — PANTOPRAZOLE SODIUM 40 MILLIGRAM(S): 20 TABLET, DELAYED RELEASE ORAL at 05:36

## 2021-06-09 RX ADMIN — OXYCODONE HYDROCHLORIDE 10 MILLIGRAM(S): 5 TABLET ORAL at 10:45

## 2021-06-09 RX ADMIN — PRASUGREL 10 MILLIGRAM(S): 5 TABLET, FILM COATED ORAL at 12:00

## 2021-06-09 RX ADMIN — Medication 1000 MILLIGRAM(S): at 14:40

## 2021-06-09 RX ADMIN — Medication 1000 MILLIGRAM(S): at 14:37

## 2021-06-09 NOTE — PROGRESS NOTE ADULT - TIME-BASED BILLING (NON-CRITICAL CARE)
Time-based billing (NON-critical care)

## 2021-06-09 NOTE — PROGRESS NOTE ADULT - ASSESSMENT
64 yo male admitted with left septic knee and severe PVD    # Left Knee Septic Arthritis with MSSA bacteremia- resolving  - repeat Blood Cx negative   - S/P left knee wash out by ortho  - Ortho following; recalled yesterday for L knee pain; knee xray as above, grossly unchanged from prior MRI. Will see pt again today          Left knee lateral drain removed (6/1), Medial knee drain and occlusive dressing removed on 6/3  - ID on board; currently on continue nafcillin 2g q 4 hours, will need 6 weeks from time of last debridement (5/27-7/7) -- to finish with cefazolin 2g q 8 hours  - PICC line placed on 6/4  - pain management recalled for left knee pain    #Biliary Drainage from previous perc sudhir site- resolved   - Previous Intraabdominal Cx 9/4/2020 -- VRE faecium and pseudomonas aeruginosa  - Wound Cx 5/1 growing Pseudomonas/VRE Faecium  - CT Abdomen and Pelvis w/ IV Cont (04.26.21 @ 04:42): No evidence of acute intra-abdominal pathology. Decreased area of right upper quadrant subcutaneous stranding at site of prior percutaneous cholecystostomy, without evidence of abscess  - HIDA scan unable to visualize contrast in GB   - spoke w/ Dr. Tello 5/3 after HIDA who recommended outpatient ERCP and cholecystectomy to divert bile and heal fistula  - At this time RUQ tract appears to be closed and well healed w/o active drainage   - LFT's unremarkable   - spoke w/ Dr. 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  - No further intervention per Ortho     # PVD  - Arterial doppler 5/29 of bilateral LE show normal arterial flow in the bilateral lower extremities. Limited exam due to bandages  - Vascular Cardiology team following, no further intervention   c/w ASA and Effient and high intensity statin   c/w GDMT for HFrEF     # DM2 c/b hyperglycemia- well controlled  - Lantus 25u in the morning  - cont lispro 3 tid w/ meals      #Microcytic Anemia   - Iron def + ACD?, no evidence of GI bleed  - will start iron supplementation  - o/p GI f/u    #CAD s/p PCI to RCA and OM1, s/p LIMA to LAD (2018)   #Severe ischemic cardiomyopathy w/ reduced EF s/p CRT-D   #HTN   #DLD   - JOHN PAUL 5/2021 w/ ef 20-25%  - on ASA and Effient   - restarted on high intensity statin  - c/w GDMT (Toprol, Aldactone, Digoxn, Lasix prn ) for HFrEF     # Covid vaccine status:   He has received one dose and is supposed to receive the second dose on 5/15/21 but he was already hospitalized. Can receive 2nd dose anytime after leaving hospital.    # DVT ppx: Lovenox  # GI ppx: PPI from home  # Diet: DASH  # Code Status: Full Code  # Dispo: awaiting auth for STR, anticipated for tomorrow 66 yo male admitted with left septic knee and severe PVD    # Left Knee Septic Arthritis with MSSA bacteremia- resolving  - repeat Blood Cx negative   - S/P left knee wash out by ortho  - Ortho following; recalled yesterday for L knee pain; knee xray as above, grossly unchanged from prior MRI. Will see pt again today          Left knee lateral drain removed (6/1), Medial knee drain and occlusive dressing removed on 6/3  - ID on board     ESR/CRP are improving      continue nafcillin 2g q 4 hours     will need 6 weeks from time of last debridement (5/27-7/7) -- to finish with cefazolin 2g q 8 hours     Weekly CBC, CMP, ESR/CRP     ID follow-up with Dr. Woody Cruz for Telehealth. We will call the patient between 10:30-1:30      [address]       [phone]       Fax [phone]  - PICC line placed on 6/4  - pain management recalled for left knee pain    #Biliary Drainage from previous perc sudhir site- resolved   - Previous Intraabdominal Cx 9/4/2020 -- VRE faecium and pseudomonas aeruginosa  - Wound Cx 5/1 growing Pseudomonas/VRE Faecium  - CT Abdomen and Pelvis w/ IV Cont (04.26.21 @ 04:42): No evidence of acute intra-abdominal pathology. Decreased area of right upper quadrant subcutaneous stranding at site of prior percutaneous cholecystostomy, without evidence of abscess  - HIDA scan unable to visualize contrast in GB   - spoke w/ Dr. Tello 5/3 after HIDA who recommended outpatient ERCP and cholecystectomy to divert bile and heal fistula  - At this time RUQ tract appears to be closed and well healed w/o active drainage   - LFT's unremarkable   - spoke w/ Dr. Tello again on 6/3 - no plan for acute intervention, OP follow up in 1-2 weeks. Upon discharge please contact [facility] to schedule follow up for ERCP w/stent placement. Will need to be off AC and DAPT 5 days prior to procedure.    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  - No further intervention per Ortho     # PVD  - Arterial doppler 5/29 of bilateral LE show normal arterial flow in the bilateral lower extremities. Limited exam due to bandages  - Vascular Cardiology team following, no further intervention   c/w ASA and Effient and high intensity statin   c/w GDMT for HFrEF     # DM2 c/b hyperglycemia- well controlled  - Lantus 25u in the morning  - cont lispro 3 tid w/ meals      #Microcytic Anemia   - Iron def + ACD?, no evidence of GI bleed  - will start iron supplementation  - o/p GI f/u    #CAD s/p PCI to RCA and OM1, s/p LIMA to LAD (2018)   #Severe ischemic cardiomyopathy w/ reduced EF s/p CRT-D   #HTN   #DLD   - JOHN PAUL 5/2021 w/ ef 20-25%  - on ASA and Effient   - restarted on high intensity statin  - c/w GDMT (Toprol, Aldactone, Digoxn, Lasix prn ) for HFrEF     # Covid vaccine status:   He has received one dose and is supposed to receive the second dose on 5/15/21 but he was already hospitalized. Can receive 2nd dose anytime after leaving hospital.    # DVT ppx: Lovenox  # GI ppx: PPI from home  # Diet: DASH  # Code Status: Full Code  # Dispo: awaiting auth for STR, anticipated for tomorrow    #Progress Note Handoff  Pending (specify): pain control and rehab placement  Family discussion: had an extensive conversation today eith patient regarding medical updates and plan of care.  Disposition: Unknown at this time________

## 2021-06-09 NOTE — PROGRESS NOTE ADULT - SUBJECTIVE AND OBJECTIVE BOX
FLOWER MARISCAL  65y  Male      Patient is a 65y old  Male who presents with a chief complaint of Septic arthritis (08 Jun 2021 20:54)      INTERVAL HPI/OVERNIGHT EVENTS: no acute events overnight. patient complaining of pain.       REVIEW OF SYSTEMS:  CONSTITUTIONAL: No fever, weight loss, or fatigue  RESPIRATORY: No cough, wheezing, chills or hemoptysis; No shortness of breath  CARDIOVASCULAR: No chest pain, palpitations, dizziness, or leg swelling  GASTROINTESTINAL: No abdominal or epigastric pain. No nausea, vomiting, or hematemesis; No diarrhea or constipation. No melena or hematochezia.  GENITOURINARY: No dysuria, frequency, hematuria, or incontinence  NEUROLOGICAL: No headaches, memory loss, loss of strength, numbness, or tremors  SKIN: No itching, burning, rashes, or lesions   MUSCULOSKELETAL: No joint pain or swelling; No muscle, back, or extremity pain  PSYCHIATRIC: No depression, anxiety, mood swings, or difficulty sleeping  All other review of systems negative    VITALS  T(C): 35.8 (06-09-21 @ 05:35), Max: 36.7 (06-08-21 @ 13:20)  HR: 80 (06-09-21 @ 05:35) (77 - 82)  BP: 119/71 (06-09-21 @ 05:35) (117/59 - 124/57)  RR: 18 (06-09-21 @ 05:35) (18 - 18)  SpO2: --  Wt(kg): --Vital Signs Last 24 Hrs  T(C): 35.8 (09 Jun 2021 05:35), Max: 36.7 (08 Jun 2021 13:20)  T(F): 96.4 (09 Jun 2021 05:35), Max: 98.1 (08 Jun 2021 13:20)  HR: 80 (09 Jun 2021 05:35) (77 - 82)  BP: 119/71 (09 Jun 2021 05:35) (117/59 - 124/57)  BP(mean): --  RR: 18 (09 Jun 2021 05:35) (18 - 18)  SpO2: --      06-08-21 @ 07:01  -  06-09-21 @ 07:00  --------------------------------------------------------  IN: 0 mL / OUT: 500 mL / NET: -500 mL        PHYSICAL EXAM:  GENERAL: NAD, well-groomed, well-developed  PSYCH: no agitation, baseline mentation  NERVOUS SYSTEM:  Alert & Oriented X3, Motor Strength 5/5 B/L upper and lower extremities; Sensory intact; FTN WNL  PULMONARY: Clear to percussion bilaterally; No rales, rhonchi, wheezing, or rubs  CARDIOVASCULAR: Regular rate and rhythm; No murmurs, rubs, or gallops  GI: Soft, Nontender, Nondistended; Bowel sounds present  EXTREMITIES:  2+ Peripheral Pulses, No clubbing, cyanosis, or edema  LYMPH: No lymphadenopathy noted  SKIN: No rashes or lesions    Consultant(s) Notes Reviewed:  [x ] YES  [ ] NO    Discussed with Consultants/Other Providers [ x] YES     LABS                          8.6    8.60  )-----------( 575      ( 08 Jun 2021 05:35 )             29.6     06-08    137  |  101  |  22<H>  ----------------------------<  128<H>  5.2<H>   |  26  |  0.9    Ca    8.0<L>      08 Jun 2021 05:35  Phos  3.9     06-08  Mg     2.0     06-08    TPro  6.9  /  Alb  2.9<L>  /  TBili  0.5  /  DBili  x   /  AST  43<H>  /  ALT  48<H>  /  AlkPhos  99  06-08          Lactate Trend        CAPILLARY BLOOD GLUCOSE      POCT Blood Glucose.: 99 mg/dL (09 Jun 2021 10:57)        RADIOLOGY & ADDITIONAL TESTS:    Imaging Personally Reviewed:  [ ] YES  [ ] NO    HEALTH ISSUES - PROBLEM Dx:  Knee pain, left  Knee pain, left           FLOWER MARISCAL  65y  Male      Patient is a 65y old  Male who presents with a chief complaint of Septic arthritis (08 Jun 2021 20:54)      INTERVAL HPI/OVERNIGHT EVENTS: no acute events overnight. patient complaining of pain.       REVIEW OF SYSTEMS:  CONSTITUTIONAL: No fever, weight loss, or fatigue  RESPIRATORY: No cough, wheezing, chills or hemoptysis; No shortness of breath  CARDIOVASCULAR: No chest pain, palpitations, dizziness, or leg swelling  GASTROINTESTINAL: No abdominal or epigastric pain. No nausea, vomiting, or hematemesis; No diarrhea or constipation. No melena or hematochezia.  GENITOURINARY: No dysuria, frequency, hematuria, or incontinence  NEUROLOGICAL: No headaches, memory loss, loss of strength, numbness, or tremors  SKIN: No itching, burning, rashes, or lesions   MUSCULOSKELETAL: No joint pain or swelling; No muscle, back, or extremity pain  PSYCHIATRIC: No depression, anxiety, mood swings, or difficulty sleeping  All other review of systems negative    VITALS  T(C): 35.8 (06-09-21 @ 05:35), Max: 36.7 (06-08-21 @ 13:20)  HR: 80 (06-09-21 @ 05:35) (77 - 82)  BP: 119/71 (06-09-21 @ 05:35) (117/59 - 124/57)  RR: 18 (06-09-21 @ 05:35) (18 - 18)  SpO2: --  Wt(kg): --Vital Signs Last 24 Hrs  T(C): 35.8 (09 Jun 2021 05:35), Max: 36.7 (08 Jun 2021 13:20)  T(F): 96.4 (09 Jun 2021 05:35), Max: 98.1 (08 Jun 2021 13:20)  HR: 80 (09 Jun 2021 05:35) (77 - 82)  BP: 119/71 (09 Jun 2021 05:35) (117/59 - 124/57)  BP(mean): --  RR: 18 (09 Jun 2021 05:35) (18 - 18)  SpO2: --      06-08-21 @ 07:01  -  06-09-21 @ 07:00  --------------------------------------------------------  IN: 0 mL / OUT: 500 mL / NET: -500 mL        PHYSICAL EXAM:  GENERAL: NAD, well-groomed, well-developed  Eyes: anicteric sclera, non injected conjunctiva, EOMI  ENT: oropharynx clear, MMM, fair dentition, hearing grossly intact  PSYCH: no agitation, baseline mentation  NERVOUS SYSTEM:  Alert & Oriented X3, Motor Strength 5/5 B/L upper and lower extremities; Sensory intact; FTN WNL  PULMONARY: Clear to percussion bilaterally; No rales, rhonchi, wheezing, or rubs  CARDIOVASCULAR: Regular rate and rhythm; No murmurs, rubs, or gallops  GI: Soft, Nontender, Nondistended; Bowel sounds present  EXTREMITIES:  2+ Peripheral Pulses, No clubbing, cyanosis, or edema  LYMPH: No lymphadenopathy noted  SKIN: No rashes or lesions    Consultant(s) Notes Reviewed:  [x ] YES  [ ] NO    Discussed with Consultants/Other Providers [ x] YES     LABS                          8.6    8.60  )-----------( 575      ( 08 Jun 2021 05:35 )             29.6     06-08    137  |  101  |  22<H>  ----------------------------<  128<H>  5.2<H>   |  26  |  0.9    Ca    8.0<L>      08 Jun 2021 05:35  Phos  3.9     06-08  Mg     2.0     06-08  TPro  6.9  /  Alb  2.9<L>  /  TBili  0.5  /  DBili  x   /  AST  43<H>  /  ALT  48<H>  /  AlkPhos  99  06-08    POCT Blood Glucose.: 99 mg/dL (09 Jun 2021 10:57)    RADIOLOGY & ADDITIONAL TESTS:  Xray Knee 1 or 2 Views, Left (06.07.21 @ 20:40)   MPRESSION:  Erosions throughout theknee joint consistent with septic arthritis/osteomyelitis. Findings are grossly unchanged from previous MRI. Antibiotic coated beads in the suprapatellar joint space. Diffuse soft tissue swelling.      MEDICATIONS  (STANDING):  acetaminophen   Tablet .. 1000 milliGRAM(s) Oral every 8 hours  aspirin  chewable 81 milliGRAM(s) Oral daily  atorvastatin 80 milliGRAM(s) Oral at bedtime  dextrose 40% Gel 15 Gram(s) Oral once  dextrose 5%. 1000 milliLiter(s) (50 mL/Hr) IV Continuous <Continuous>  dextrose 5%. 1000 milliLiter(s) (100 mL/Hr) IV Continuous <Continuous>  dextrose 50% Injectable 25 Gram(s) IV Push once  dextrose 50% Injectable 12.5 Gram(s) IV Push once  dextrose 50% Injectable 25 Gram(s) IV Push once  digoxin     Tablet 125 MICROGram(s) Oral daily  DULoxetine 90 milliGRAM(s) Oral daily  enoxaparin Injectable 40 milliGRAM(s) SubCutaneous daily  ferrous    sulfate 325 milliGRAM(s) Oral daily  gabapentin 600 milliGRAM(s) Oral three times a day  glucagon  Injectable 1 milliGRAM(s) IntraMuscular once  insulin glargine Injectable (LANTUS) 25 Unit(s) SubCutaneous every morning  insulin lispro (ADMELOG) corrective regimen sliding scale   SubCutaneous three times a day before meals  insulin lispro Injectable (ADMELOG) 3 Unit(s) SubCutaneous three times a day before meals  metoprolol succinate ER 25 milliGRAM(s) Oral daily  nafcillin  IVPB 2 Gram(s) IV Intermittent every 4 hours  oxyCODONE  ER Tablet 20 milliGRAM(s) Oral every 12 hours  pantoprazole    Tablet 40 milliGRAM(s) Oral before breakfast  prasugrel 10 milliGRAM(s) Oral daily    MEDICATIONS  (PRN):  ALPRAZolam 0.5 milliGRAM(s) Oral every 8 hours PRN anxiety  baclofen 10 milliGRAM(s) Oral three times a day PRN Muscle Spasm  furosemide    Tablet 40 milliGRAM(s) Oral daily PRN fluid overload  morphine  - Injectable 1 milliGRAM(s) IV Push every 8 hours PRN Severe Pain (7 - 10)  ondansetron Injectable 4 milliGRAM(s) IV Push three times a day PRN Nausea and/or Vomiting  oxyCODONE    IR 10 milliGRAM(s) Oral every 4 hours PRN Severe Pain (7 - 10)           FLOWER MARISCAL  65y  Male      Patient is a 65y old  Male who presents with a chief complaint of Septic arthritis (08 Jun 2021 20:54)      INTERVAL HPI/OVERNIGHT EVENTS: no acute events overnight. patient complaining of pain and "drainage". Demanding GI "come and fix the leak".      REVIEW OF SYSTEMS:  CONSTITUTIONAL: No fever, weight loss, or fatigue  RESPIRATORY: No cough, wheezing, chills or hemoptysis; No shortness of breath  CARDIOVASCULAR: No chest pain, palpitations, dizziness, or leg swelling  GASTROINTESTINAL: No abdominal or epigastric pain. No nausea, vomiting, or hematemesis; No diarrhea or constipation. No melena or hematochezia.  GENITOURINARY: No dysuria, frequency, hematuria, or incontinence  NEUROLOGICAL: No headaches, memory loss, loss of strength, numbness, or tremors  SKIN: No itching, burning, rashes, or lesions   MUSCULOSKELETAL: No joint pain or swelling; No muscle, back, or extremity pain  PSYCHIATRIC: No depression, anxiety, mood swings, or difficulty sleeping  All other review of systems negative    VITALS  T(C): 35.8 (06-09-21 @ 05:35), Max: 36.7 (06-08-21 @ 13:20)  HR: 80 (06-09-21 @ 05:35) (77 - 82)  BP: 119/71 (06-09-21 @ 05:35) (117/59 - 124/57)  RR: 18 (06-09-21 @ 05:35) (18 - 18)  SpO2: --  Wt(kg): --Vital Signs Last 24 Hrs  T(C): 35.8 (09 Jun 2021 05:35), Max: 36.7 (08 Jun 2021 13:20)  T(F): 96.4 (09 Jun 2021 05:35), Max: 98.1 (08 Jun 2021 13:20)  HR: 80 (09 Jun 2021 05:35) (77 - 82)  BP: 119/71 (09 Jun 2021 05:35) (117/59 - 124/57)  BP(mean): --  RR: 18 (09 Jun 2021 05:35) (18 - 18)  SpO2: --      06-08-21 @ 07:01  -  06-09-21 @ 07:00  --------------------------------------------------------  IN: 0 mL / OUT: 500 mL / NET: -500 mL        PHYSICAL EXAM:  GENERAL: NAD, well-groomed, well-developed  Eyes: anicteric sclera, non injected conjunctiva, EOMI  ENT: oropharynx clear, MMM, fair dentition, hearing grossly intact  PSYCH: no agitation, baseline mentation  NERVOUS SYSTEM:  Alert & Oriented X3, Motor Strength 5/5 B/L upper and lower extremities; Sensory intact; FTN WNL  PULMONARY: Clear to percussion bilaterally; No rales, rhonchi, wheezing, or rubs  CARDIOVASCULAR: Regular rate and rhythm; No murmurs, rubs, or gallops  GI: Soft, Nontender, Nondistended; Bowel sounds present  EXTREMITIES:  2+ Peripheral Pulses, No clubbing, cyanosis, or edema  LYMPH: No lymphadenopathy noted  SKIN: No rashes or lesions    Consultant(s) Notes Reviewed:  [x ] YES  [ ] NO    Discussed with Consultants/Other Providers [ x] YES     LABS                          8.6    8.60  )-----------( 575      ( 08 Jun 2021 05:35 )             29.6     06-08    137  |  101  |  22<H>  ----------------------------<  128<H>  5.2<H>   |  26  |  0.9    Ca    8.0<L>      08 Jun 2021 05:35  Phos  3.9     06-08  Mg     2.0     06-08  TPro  6.9  /  Alb  2.9<L>  /  TBili  0.5  /  DBili  x   /  AST  43<H>  /  ALT  48<H>  /  AlkPhos  99  06-08    POCT Blood Glucose.: 99 mg/dL (09 Jun 2021 10:57)    RADIOLOGY & ADDITIONAL TESTS:  Xray Knee 1 or 2 Views, Left (06.07.21 @ 20:40)   MPRESSION:  Erosions throughout theknee joint consistent with septic arthritis/osteomyelitis. Findings are grossly unchanged from previous MRI. Antibiotic coated beads in the suprapatellar joint space. Diffuse soft tissue swelling.      MEDICATIONS  (STANDING):  acetaminophen   Tablet .. 1000 milliGRAM(s) Oral every 8 hours  aspirin  chewable 81 milliGRAM(s) Oral daily  atorvastatin 80 milliGRAM(s) Oral at bedtime  dextrose 40% Gel 15 Gram(s) Oral once  dextrose 5%. 1000 milliLiter(s) (50 mL/Hr) IV Continuous <Continuous>  dextrose 5%. 1000 milliLiter(s) (100 mL/Hr) IV Continuous <Continuous>  dextrose 50% Injectable 25 Gram(s) IV Push once  dextrose 50% Injectable 12.5 Gram(s) IV Push once  dextrose 50% Injectable 25 Gram(s) IV Push once  digoxin     Tablet 125 MICROGram(s) Oral daily  DULoxetine 90 milliGRAM(s) Oral daily  enoxaparin Injectable 40 milliGRAM(s) SubCutaneous daily  ferrous    sulfate 325 milliGRAM(s) Oral daily  gabapentin 600 milliGRAM(s) Oral three times a day  glucagon  Injectable 1 milliGRAM(s) IntraMuscular once  insulin glargine Injectable (LANTUS) 25 Unit(s) SubCutaneous every morning  insulin lispro (ADMELOG) corrective regimen sliding scale   SubCutaneous three times a day before meals  insulin lispro Injectable (ADMELOG) 3 Unit(s) SubCutaneous three times a day before meals  metoprolol succinate ER 25 milliGRAM(s) Oral daily  nafcillin  IVPB 2 Gram(s) IV Intermittent every 4 hours  oxyCODONE  ER Tablet 20 milliGRAM(s) Oral every 12 hours  pantoprazole    Tablet 40 milliGRAM(s) Oral before breakfast  prasugrel 10 milliGRAM(s) Oral daily    MEDICATIONS  (PRN):  ALPRAZolam 0.5 milliGRAM(s) Oral every 8 hours PRN anxiety  baclofen 10 milliGRAM(s) Oral three times a day PRN Muscle Spasm  furosemide    Tablet 40 milliGRAM(s) Oral daily PRN fluid overload  morphine  - Injectable 1 milliGRAM(s) IV Push every 8 hours PRN Severe Pain (7 - 10)  ondansetron Injectable 4 milliGRAM(s) IV Push three times a day PRN Nausea and/or Vomiting  oxyCODONE    IR 10 milliGRAM(s) Oral every 4 hours PRN Severe Pain (7 - 10)

## 2021-06-09 NOTE — PROGRESS NOTE ADULT - SUBJECTIVE AND OBJECTIVE BOX
Chief Complaint:  Knee pain     Patient seen and examined at bedside    He says that today his pain is better controlled. He takes the IR medication when he sees that physical therapy is coming to do PT with the patient in bed A; however, he doesnt feel that it kicks in fast enough so he requests the IV morphine. I explained to him that there wont be a 0/10 pain; the goal is to have manageable pain that he can participate in therapy. I explained that the morphine is for breakthrough pain, meaning that only of the ER and IR don't work to alleviate his pain and it is still severe, he should request the morphine. I explained that the IV medications will be stopped.     PAST MEDICAL & SURGICAL HISTORY:  Status post percutaneous transluminal coronary angioplasty  2 stents    Atherosclerosis of coronary artery  CAD (coronary artery disease)    Osteoarthritis    HLD (hyperlipidemia)    Blood clot due to device, implant, or graft  was on blood thinners    Diabetes  on insulin pump    HTN (hypertension)    CHF (congestive heart failure)  EF ~ 25%    History of celiac disease    Diverticulitis    STEMI (ST elevation myocardial infarction)    Diabetic neuropathy    Anxiety and depression    Other postprocedural status  Fixation hardware in foot LEFT    Stented coronary artery  10/18 heart attack  INFERIOR WALL MI    S/P CABG x 1  2018    S/P TKR (total knee replacement), right  with infection Mrsa   per pt he was cleared from MRSA infection    Surgery, elective  right knee wound debridement    History of open reduction and internal fixation (ORIF) procedure    H/O shoulder surgery  right    AICD (automatic cardioverter/defibrillator) present    Cholecystostomy care  drain inserted 2020 &amp; removed 4 months ago    History of tonsillectomy    History of hip replacement, total, right        SOCIAL HISTORY:  [ ] Denies Smoking, Alcohol, or Drug Use    Allergies    ACE inhibitors (Hives)  carvedilol (Other)  enalapril (Hives)  Entresto (Other)    Intolerances        PAIN MEDICATIONS:  acetaminophen   Tablet .. 1000 milliGRAM(s) Oral every 8 hours  ALPRAZolam 0.5 milliGRAM(s) Oral every 8 hours PRN  baclofen 10 milliGRAM(s) Oral three times a day PRN  DULoxetine 90 milliGRAM(s) Oral daily  gabapentin 600 milliGRAM(s) Oral three times a day  morphine  - Injectable 1 milliGRAM(s) IV Push every 8 hours PRN  ondansetron Injectable 4 milliGRAM(s) IV Push three times a day PRN  oxyCODONE    IR 10 milliGRAM(s) Oral every 4 hours PRN  oxyCODONE  ER Tablet 20 milliGRAM(s) Oral every 12 hours    Heme:  aspirin  chewable 81 milliGRAM(s) Oral daily  enoxaparin Injectable 40 milliGRAM(s) SubCutaneous daily  prasugrel 10 milliGRAM(s) Oral daily    Antibiotics:  nafcillin  IVPB 2 Gram(s) IV Intermittent every 4 hours    Cardiac:  digoxin     Tablet 125 MICROGram(s) Oral daily  furosemide    Tablet 40 milliGRAM(s) Oral daily PRN  metoprolol succinate ER 25 milliGRAM(s) Oral daily    Pulmonary:    Endocrine  atorvastatin 80 milliGRAM(s) Oral at bedtime  dextrose 40% Gel 15 Gram(s) Oral once  dextrose 50% Injectable 25 Gram(s) IV Push once  dextrose 50% Injectable 12.5 Gram(s) IV Push once  dextrose 50% Injectable 25 Gram(s) IV Push once  glucagon  Injectable 1 milliGRAM(s) IntraMuscular once  insulin glargine Injectable (LANTUS) 25 Unit(s) SubCutaneous every morning  insulin lispro (ADMELOG) corrective regimen sliding scale   SubCutaneous three times a day before meals  insulin lispro Injectable (ADMELOG) 3 Unit(s) SubCutaneous three times a day before meals    GI:  pantoprazole    Tablet 40 milliGRAM(s) Oral before breakfast    All Other Meds:  dextrose 5%. 1000 milliLiter(s) IV Continuous <Continuous>  dextrose 5%. 1000 milliLiter(s) IV Continuous <Continuous>  ferrous    sulfate 325 milliGRAM(s) Oral daily      REVIEW OF SYSTEMS:  CONSTITUTIONAL: Negative fever,chills  NECK: No pain or stiffness  RESPIRATORY: No cough, wheezing,  No shortness of breath  GASTROINTESTINAL: No abdominal, No nausea, vomiting; No diarrhea or constipation.   GENITOURINARY: No dysuria, frequency, or incontinence  NEUROLOGICAL: No headaches, memory loss, loss of strength, numbness, or  SKIN: No itching, burning, rashes, or lesions   MUSCULOSKELETAL:  L>R knee pan     PHYSICAL EXAM:    Vital Signs Last 24 Hrs  T(C): 35.8 (09 Jun 2021 05:35), Max: 36.7 (08 Jun 2021 13:20)  T(F): 96.4 (09 Jun 2021 05:35), Max: 98.1 (08 Jun 2021 13:20)  HR: 80 (09 Jun 2021 05:35) (77 - 82)  BP: 119/71 (09 Jun 2021 05:35) (117/59 - 124/57)  BP(mean): --  RR: 18 (09 Jun 2021 05:35) (18 - 18)  SpO2: --    PAIN SCORE:         SCALE USED: (1-10 VNRS)       GENERAL: Comfortable, in no apparent distress  HEENT:  Atraumatic, Normocephalic, PERRL, EOMI  NECK: Supple  LUNG: Respiration even and unlabored, no distress noted  ABDOMEN: Soft, Nontender, Nondistended  BACK: No midline bony tenderness to palpation, no step off or deformity noted.   EXTREMITIES:  ROSEN X 4; 2+ Peripheral Pulses, + bandages on LEs C/D/I  NEUROLOGIC: Awake, alert and oriented X3;  No focal deficits. Sensation intact to light touch  SKIN: Warm and Dry    LABS:                          8.6    8.60  )-----------( 575      ( 08 Jun 2021 05:35 )             29.6     06-08    137  |  101  |  22<H>  ----------------------------<  128<H>  5.2<H>   |  26  |  0.9    Ca    8.0<L>      08 Jun 2021 05:35  Phos  3.9     06-08  Mg     2.0     06-08    TPro  6.9  /  Alb  2.9<L>  /  TBili  0.5  /  DBili  x   /  AST  43<H>  /  ALT  48<H>  /  AlkPhos  99  06-08          Drug Screen:        RADIOLOGY:    < from: Xray Knee 1 or 2 Views, Left (06.07.21 @ 20:40) >    EXAM:  XR KNEE AP LAT 1-2V LT            PROCEDURE DATE:  06/07/2021            INTERPRETATION:  CLINICAL HISTORY: Pain.    COMPARISON: MRI of 5/25/2021.    TECHNIQUE: 2 views of the left knee.      FINDINGS/  IMPRESSION:    Erosions throughout theknee joint consistent with septic arthritis/osteomyelitis. Findings are grossly unchanged from previous MRI. Antibiotic coated beads in the suprapatellar joint space. Diffuse soft tissue swelling.              NELIDA ANDRADE MD; Attending Radiologist  This document has been electronically signed. Jun 8 2021  8:45AM    < end of copied text >        [ ]  [place]  Reviewed and Copied to Chart

## 2021-06-09 NOTE — PROGRESS NOTE ADULT - TIME BILLING
I have personally seen and examined this patient.    I have reviewed all pertinent clinical information and reviewed all relevant imaging and diagnostic studies personally.   I counseled the patient about diagnostic testing and treatment plan. All questions were answered.   I discussed recommendations with the primary team.
Patient education  Coordination of care
I have personally seen and examined this patient.    I have reviewed all pertinent clinical information and reviewed all relevant imaging and diagnostic studies personally.   I counseled the patient about diagnostic testing and treatment plan. All questions were answered.   I discussed recommendations with the primary team.
Patient education  Coordination of Care
Coordination of care  Patient education and patient expectations
I have personally seen and examined this patient.    I have reviewed all pertinent clinical information and reviewed all relevant imaging and diagnostic studies personally.   I counseled the patient about diagnostic testing and treatment plan. All questions were answered.   I discussed recommendations with the primary team.
plan of care, complexity
I have personally seen and examined this patient.    I have reviewed all pertinent clinical information and reviewed all relevant imaging and diagnostic studies personally.   I counseled the patient about diagnostic testing and treatment plan. All questions were answered.   I discussed recommendations with the primary team.
I have personally seen and examined this patient.    I have reviewed all pertinent clinical information and reviewed all relevant imaging and diagnostic studies personally.   I counseled the patient about diagnostic testing and treatment plan. All questions were answered.   I discussed recommendations with the primary team.
complexity of care
I have personally seen and examined this patient.    I have reviewed all pertinent clinical information and reviewed all relevant imaging and diagnostic studies personally.   I counseled the patient about diagnostic testing and treatment plan. All questions were answered.   I discussed recommendations with the primary team.
complexity, not better
I have personally seen and examined this patient.    I have reviewed all pertinent clinical information and reviewed all relevant imaging and diagnostic studies personally.   I counseled the patient about diagnostic testing and treatment plan. All questions were answered.   I discussed recommendations with the primary team.
complexity of case

## 2021-06-09 NOTE — PROGRESS NOTE ADULT - SUBJECTIVE AND OBJECTIVE BOX
FLOWER MARISCAL  65y, Male  Allergy: ACE inhibitors (Hives)  carvedilol (Other)  enalapril (Hives)  Entresto (Other)      LOS  44d    CHIEF COMPLAINT: Septic arthritis (09 Jun 2021 12:07)      INTERVAL EVENTS/HPI  - No acute events overnight  - T(F): , Max: 98.1 (06-08-21 @ 13:20)  - Denies any worsening symptoms  - chronic left knee pain at this point  - WBC Count: 8.60 (06-08-21 @ 05:35)  WBC Count: 7.69 (06-07-21 @ 07:45)     - Creatinine, Serum: 0.9 (06-08-21 @ 05:35)       ROS  General: Denies rigors, nightsweats  HEENT: Denies headache, rhinorrhea, sore throat, eye pain  CV: Denies CP, palpitations  PULM: Denies wheezing, hemoptysis  GI: Denies hematemesis, hematochezia, melena  : Denies discharge, hematuria  MSK: Denies arthralgias, myalgias  SKIN: Denies rash, lesions  NEURO: Denies paresthesias, weakness  PSYCH: Denies depression, anxiety    VITALS:  T(F): 96.4, Max: 98.1 (06-08-21 @ 13:20)  HR: 80  BP: 119/71  RR: 18Vital Signs Last 24 Hrs  T(C): 35.8 (09 Jun 2021 05:35), Max: 36.7 (08 Jun 2021 13:20)  T(F): 96.4 (09 Jun 2021 05:35), Max: 98.1 (08 Jun 2021 13:20)  HR: 80 (09 Jun 2021 05:35) (77 - 82)  BP: 119/71 (09 Jun 2021 05:35) (117/59 - 124/57)  BP(mean): --  RR: 18 (09 Jun 2021 05:35) (18 - 18)  SpO2: --    PHYSICAL EXAM:  Gen: NAD, resting in bed  HEENT: Normocephalic, atraumatic  Neck: supple, no lymphadenopathy  CV: Regular rate & regular rhythm  Lungs: decreased BS at bases, no fremitus  Abdomen: Soft, BS present  Ext: Warm, well perfused; left knee with mild edema, not much erythema  Neuro: non focal, awake  Skin: no rash, no erythema  Lines: no phlebitis    FH: Non-contributory  Social Hx: Non-contributory    TESTS & MEASUREMENTS:                        8.6    8.60  )-----------( 575      ( 08 Jun 2021 05:35 )             29.6     06-08    137  |  101  |  22<H>  ----------------------------<  128<H>  5.2<H>   |  26  |  0.9    Ca    8.0<L>      08 Jun 2021 05:35  Phos  3.9     06-08  Mg     2.0     06-08    TPro  6.9  /  Alb  2.9<L>  /  TBili  0.5  /  DBili  x   /  AST  43<H>  /  ALT  48<H>  /  AlkPhos  99  06-08      LIVER FUNCTIONS - ( 08 Jun 2021 05:35 )  Alb: 2.9 g/dL / Pro: 6.9 g/dL / ALK PHOS: 99 U/L / ALT: 48 U/L / AST: 43 U/L / GGT: x               Culture - Surgical Swab (collected 05-27-21 @ 09:05)  Source: .Surgical Swab None  Final Report (06-01-21 @ 18:59):    No growth at 5 days    Culture - Surgical Swab (collected 05-27-21 @ 09:05)  Source: .Surgical Swab None  Final Report (06-01-21 @ 19:00):    No growth at 5 days    Culture - Aspirate with Gram Stain (collected 05-24-21 @ 14:00)  Source: Joint Fl None  Final Report (05-29-21 @ 17:46):    Few Pseudomonas aeruginosa  Organism: Pseudomonas aeruginosa (05-29-21 @ 17:46)  Organism: Pseudomonas aeruginosa (05-29-21 @ 17:46)      -  Amikacin: S <=16      -  Aztreonam: S <=4      -  Cefepime: S 8      -  Ceftazidime: S 4      -  Ciprofloxacin: S <=0.25      -  Gentamicin: S 4      -  Imipenem: S 2      -  Levofloxacin: S <=0.5      -  Meropenem: S 2      -  Piperacillin/Tazobactam: S <=8      -  Tobramycin: S <=2      Method Type: YOLIE    Culture - Body Fluid with Gram Stain (collected 05-20-21 @ 16:57)  Source: .Body Fluid Synovial Fluid  Gram Stain (05-21-21 @ 07:48):    No polymorphonuclear leukocytes seen    No organisms seen    by cytocentrifuge  Final Report (06-03-21 @ 17:49):    No growth at 14 days.    Culture - Acid Fast - Body Fluid w/Smear (collected 05-11-21 @ 11:54)  Source: .Body Fluid Knee Fluid  Preliminary Report (05-19-21 @ 15:03):    No growth at 1 week.            INFECTIOUS DISEASES TESTING  COVID-19 PCR: NotDetec (06-03-21 @ 18:18)  COVID-19 PCR: NotDetec (05-25-21 @ 09:04)  COVID-19 PCR: NotDetec (05-02-21 @ 08:52)  COVID-19 PCR: Detected (04-29-21 @ 14:33)  COVID-19 PCR: NotDetec (04-26-21 @ 06:00)  COVID-19 PCR: NotDetec (03-12-21 @ 11:00)  COVID-19 PCR: Detected (02-05-21 @ 13:47)  COVID-19 PCR: NotDetec (10-13-20 @ 09:08)  COVID-19 PCR: NotDetec (10-03-20 @ 19:54)  COVID-19 PCR: NotDetec (09-01-20 @ 20:40)  COVID-19 PCR: NotDetec (07-30-20 @ 07:43)  COVID-19 PCR: NotDetec (07-16-20 @ 19:46)      INFLAMMATORY MARKERS  Sedimentation Rate, Erythrocyte: 81 mm/Hr (06-07-21 @ 07:45)  C-Reactive Protein, Serum: 43 mg/L (06-07-21 @ 07:45)  Sedimentation Rate, Erythrocyte: 127 mm/Hr (05-18-21 @ 06:36)  C-Reactive Protein, Serum: 59 mg/L (05-18-21 @ 06:36)      RADIOLOGY & ADDITIONAL TESTS:  I have personally reviewed the last available Chest xray  CXR      CT      CARDIOLOGY TESTING      MEDICATIONS  acetaminophen   Tablet .. 1000 Oral every 8 hours  aspirin  chewable 81 Oral daily  atorvastatin 80 Oral at bedtime  dextrose 40% Gel 15 Oral once  dextrose 5%. 1000 IV Continuous <Continuous>  dextrose 5%. 1000 IV Continuous <Continuous>  dextrose 50% Injectable 25 IV Push once  dextrose 50% Injectable 12.5 IV Push once  dextrose 50% Injectable 25 IV Push once  digoxin     Tablet 125 Oral daily  DULoxetine 90 Oral daily  enoxaparin Injectable 40 SubCutaneous daily  ferrous    sulfate 325 Oral daily  gabapentin 600 Oral three times a day  glucagon  Injectable 1 IntraMuscular once  insulin glargine Injectable (LANTUS) 25 SubCutaneous every morning  insulin lispro (ADMELOG) corrective regimen sliding scale  SubCutaneous three times a day before meals  insulin lispro Injectable (ADMELOG) 3 SubCutaneous three times a day before meals  metoprolol succinate ER 25 Oral daily  nafcillin  IVPB 2 IV Intermittent every 4 hours  oxyCODONE  ER Tablet 20 Oral every 12 hours  pantoprazole    Tablet 40 Oral before breakfast  prasugrel 10 Oral daily      WEIGHT  Weight (kg): 81 (05-26-21 @ 11:17)      ANTIBIOTICS:  nafcillin  IVPB 2 Gram(s) IV Intermittent every 4 hours      All available historical records have been reviewed

## 2021-06-09 NOTE — PROGRESS NOTE ADULT - ASSESSMENT
65 M admitted with septic knee arthritis s/p washouts on chronic opioid therapy    - Continue with oxycodone ER 20mg q12h   - Continue with oxycodone IR 10mg q4h PRN for severe pain; patient has not been getting this dosing schedule and has requested IV morphine  - D/C IV morphine  - Continue with other non-opioid pharmacotherapy to reduce opioid requirements.

## 2021-06-09 NOTE — PROGRESS NOTE ADULT - ASSESSMENT
ASSESSMENT  65 year old male with PMH of CAD s/p MI, PCI/CABG, CHFrEF (15-20%), has ICD, HTN, celiac disease, DM, neuropathy, chronic b/l LE ulcers presents, severe chronic pain,  hx infected R TKR with MRSA (was eventually cemented so has limited ROM R knee), and history of cholecystostomy tube placement in February 2020 for acute cholecystitis s/p removal in May 2020 with multiple presentations including persistent bilious drainage from the prior tract site as well as a RUQ abdominal abscess at the site s/p drainage who presents with left knee pain    IMPRESSION  #Septic Arthritis of left knee with MSSA bacteremia  - s/p arthrocentesis of left knee - consistent with baterial septic infection   - s/p OR washout 4/26 -- purulent fluid in left knee with significant tricompartmental arthritis   - Blood Cx 4/26 MSSA  - OR Cx 4/26 MSSA  - Blood Cx 4/27 NG, 4/28 growing Staph   - Blood Cx 4/30-5/4 NG  - JOHN PAUL 5/5: no evidence of valvular or lead vegation; noted mild, non-mobile atheroma seen in the thoracic aorta.   - MR Knee w/wo IV Cont, Left (05.06.21 @ 19:05): Septic arthritis of the knee with osteomyelitis in the distal femur and proximal tibia. Associated large joint effusion with extensive synovitis. Noadditional collection identified.  - s/p knee tap 5/9 -- 15 cc of blood fluid; WBC 79304 (95% PMN)  - s/p knee tap 5/11 WBC 13299 (97% PMN)  - s/p Arthroscopic drainage of knee 5/27 -- no purulence but noted large organized hematoma -- antibiotic beads placed - Wound Cx 5/27 NG      #Biliary Drainage from previous perc sudhir site  - Previous Intraabdominal 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 Decreased area of right upper quadrant subcutaneous stranding at site of prior percutaneous cholecystostomy, without evidence of abscess.  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    #CHF s/p ICD  #DM   #Abx allergy: carvedilol (Other)  enalapril (Hives)  Entresto (Other)    Creatinine, Serum: 1.0 mg/dL (04.25.21 @ 22:15)  Weight (kg): 81 (26 Apr 2021 17:23)    RECOMMENDATIONS  - ESR/CRP are improving   - continue nafcillin 2g q 4 hours  -  will need 6 weeks from time of last debridement (5/27-7/7) -- to finish with cefazolin 2g q 8 hours    - Weekly CBC, CMP, ESR/CRP  - ID follow-up with Dr. Woody Cruz for Telehealth. We will call the patient between 10:30-1:30      [address]       [phone]       Fax [phone]    Please call or message on [employer] Teams if with any questions.  Spectra [phone]

## 2021-06-09 NOTE — DISCHARGE NOTE NURSING/CASE MANAGEMENT/SOCIAL WORK - PATIENT PORTAL LINK FT
You can access the FollowMyHealth Patient Portal offered by [facility] by registering at the following website: [email or web address]. By joining [facility]’s FollowMyHealth portal, you will also be able to view your health information using other applications (apps) compatible with our system.

## 2021-06-10 LAB
ALBUMIN SERPL ELPH-MCNC: 2.7 G/DL — LOW (ref 3.5–5.2)
ALP SERPL-CCNC: 103 U/L — SIGNIFICANT CHANGE UP (ref 30–115)
ALT FLD-CCNC: 52 U/L — HIGH (ref 0–41)
ANION GAP SERPL CALC-SCNC: 14 MMOL/L — SIGNIFICANT CHANGE UP (ref 7–14)
AST SERPL-CCNC: 53 U/L — HIGH (ref 0–41)
BILIRUB SERPL-MCNC: 0.5 MG/DL — SIGNIFICANT CHANGE UP (ref 0.2–1.2)
BUN SERPL-MCNC: 24 MG/DL — HIGH (ref 10–20)
CALCIUM SERPL-MCNC: 8.2 MG/DL — LOW (ref 8.5–10.1)
CHLORIDE SERPL-SCNC: 103 MMOL/L — SIGNIFICANT CHANGE UP (ref 98–110)
CO2 SERPL-SCNC: 20 MMOL/L — SIGNIFICANT CHANGE UP (ref 17–32)
CREAT SERPL-MCNC: 1 MG/DL — SIGNIFICANT CHANGE UP (ref 0.7–1.5)
ERYTHROCYTE [SEDIMENTATION RATE] IN BLOOD: 56 MM/HR — HIGH (ref 0–10)
GLUCOSE BLDC GLUCOMTR-MCNC: 121 MG/DL — HIGH (ref 70–99)
GLUCOSE BLDC GLUCOMTR-MCNC: 126 MG/DL — HIGH (ref 70–99)
GLUCOSE BLDC GLUCOMTR-MCNC: 195 MG/DL — HIGH (ref 70–99)
GLUCOSE BLDC GLUCOMTR-MCNC: 70 MG/DL — SIGNIFICANT CHANGE UP (ref 70–99)
GLUCOSE SERPL-MCNC: 118 MG/DL — HIGH (ref 70–99)
HCT VFR BLD CALC: 31 % — LOW (ref 42–52)
HGB BLD-MCNC: 9.1 G/DL — LOW (ref 14–18)
MAGNESIUM SERPL-MCNC: 2 MG/DL — SIGNIFICANT CHANGE UP (ref 1.8–2.4)
MCHC RBC-ENTMCNC: 22.4 PG — LOW (ref 27–31)
MCHC RBC-ENTMCNC: 29.4 G/DL — LOW (ref 32–37)
MCV RBC AUTO: 76.4 FL — LOW (ref 80–94)
NRBC # BLD: 0 /100 WBCS — SIGNIFICANT CHANGE UP (ref 0–0)
PLATELET # BLD AUTO: 521 K/UL — HIGH (ref 130–400)
POTASSIUM SERPL-MCNC: 5.3 MMOL/L — HIGH (ref 3.5–5)
POTASSIUM SERPL-SCNC: 5.3 MMOL/L — HIGH (ref 3.5–5)
PROT SERPL-MCNC: 6.6 G/DL — SIGNIFICANT CHANGE UP (ref 6–8)
RBC # BLD: 4.06 M/UL — LOW (ref 4.7–6.1)
RBC # FLD: 25.5 % — HIGH (ref 11.5–14.5)
SODIUM SERPL-SCNC: 137 MMOL/L — SIGNIFICANT CHANGE UP (ref 135–146)
WBC # BLD: 7.59 K/UL — SIGNIFICANT CHANGE UP (ref 4.8–10.8)
WBC # FLD AUTO: 7.59 K/UL — SIGNIFICANT CHANGE UP (ref 4.8–10.8)

## 2021-06-10 PROCEDURE — 99233 SBSQ HOSP IP/OBS HIGH 50: CPT

## 2021-06-10 RX ORDER — SODIUM POLYSTYRENE SULFONATE 4.1 MEQ/G
15 POWDER, FOR SUSPENSION ORAL ONCE
Refills: 0 | Status: COMPLETED | OUTPATIENT
Start: 2021-06-10 | End: 2021-06-10

## 2021-06-10 RX ADMIN — Medication 125 MICROGRAM(S): at 06:00

## 2021-06-10 RX ADMIN — Medication 1000 MILLIGRAM(S): at 16:45

## 2021-06-10 RX ADMIN — NAFCILLIN 200 GRAM(S): 10 INJECTION, POWDER, FOR SOLUTION INTRAVENOUS at 09:10

## 2021-06-10 RX ADMIN — PRASUGREL 10 MILLIGRAM(S): 5 TABLET, FILM COATED ORAL at 11:06

## 2021-06-10 RX ADMIN — NAFCILLIN 200 GRAM(S): 10 INJECTION, POWDER, FOR SOLUTION INTRAVENOUS at 02:08

## 2021-06-10 RX ADMIN — OXYCODONE HYDROCHLORIDE 20 MILLIGRAM(S): 5 TABLET ORAL at 17:16

## 2021-06-10 RX ADMIN — ATORVASTATIN CALCIUM 80 MILLIGRAM(S): 80 TABLET, FILM COATED ORAL at 21:29

## 2021-06-10 RX ADMIN — GABAPENTIN 600 MILLIGRAM(S): 400 CAPSULE ORAL at 13:33

## 2021-06-10 RX ADMIN — Medication 1000 MILLIGRAM(S): at 06:00

## 2021-06-10 RX ADMIN — PANTOPRAZOLE SODIUM 40 MILLIGRAM(S): 20 TABLET, DELAYED RELEASE ORAL at 06:00

## 2021-06-10 RX ADMIN — Medication 25 MILLIGRAM(S): at 06:00

## 2021-06-10 RX ADMIN — Medication 1000 MILLIGRAM(S): at 13:32

## 2021-06-10 RX ADMIN — GABAPENTIN 600 MILLIGRAM(S): 400 CAPSULE ORAL at 21:29

## 2021-06-10 RX ADMIN — OXYCODONE HYDROCHLORIDE 20 MILLIGRAM(S): 5 TABLET ORAL at 18:51

## 2021-06-10 RX ADMIN — NAFCILLIN 200 GRAM(S): 10 INJECTION, POWDER, FOR SOLUTION INTRAVENOUS at 17:16

## 2021-06-10 RX ADMIN — NAFCILLIN 200 GRAM(S): 10 INJECTION, POWDER, FOR SOLUTION INTRAVENOUS at 06:00

## 2021-06-10 RX ADMIN — NAFCILLIN 200 GRAM(S): 10 INJECTION, POWDER, FOR SOLUTION INTRAVENOUS at 13:32

## 2021-06-10 RX ADMIN — OXYCODONE HYDROCHLORIDE 10 MILLIGRAM(S): 5 TABLET ORAL at 11:38

## 2021-06-10 RX ADMIN — OXYCODONE HYDROCHLORIDE 10 MILLIGRAM(S): 5 TABLET ORAL at 00:30

## 2021-06-10 RX ADMIN — ENOXAPARIN SODIUM 40 MILLIGRAM(S): 100 INJECTION SUBCUTANEOUS at 11:06

## 2021-06-10 RX ADMIN — Medication 3 UNIT(S): at 07:50

## 2021-06-10 RX ADMIN — OXYCODONE HYDROCHLORIDE 20 MILLIGRAM(S): 5 TABLET ORAL at 06:00

## 2021-06-10 RX ADMIN — Medication 1000 MILLIGRAM(S): at 22:41

## 2021-06-10 RX ADMIN — GABAPENTIN 600 MILLIGRAM(S): 400 CAPSULE ORAL at 06:00

## 2021-06-10 RX ADMIN — OXYCODONE HYDROCHLORIDE 10 MILLIGRAM(S): 5 TABLET ORAL at 18:54

## 2021-06-10 RX ADMIN — Medication 325 MILLIGRAM(S): at 11:06

## 2021-06-10 RX ADMIN — OXYCODONE HYDROCHLORIDE 10 MILLIGRAM(S): 5 TABLET ORAL at 19:31

## 2021-06-10 RX ADMIN — Medication 3 UNIT(S): at 11:53

## 2021-06-10 RX ADMIN — SODIUM POLYSTYRENE SULFONATE 15 GRAM(S): 4.1 POWDER, FOR SUSPENSION ORAL at 10:56

## 2021-06-10 RX ADMIN — DULOXETINE HYDROCHLORIDE 90 MILLIGRAM(S): 30 CAPSULE, DELAYED RELEASE ORAL at 11:06

## 2021-06-10 RX ADMIN — Medication 2: at 07:50

## 2021-06-10 RX ADMIN — Medication 81 MILLIGRAM(S): at 11:05

## 2021-06-10 RX ADMIN — INSULIN GLARGINE 25 UNIT(S): 100 INJECTION, SOLUTION SUBCUTANEOUS at 08:20

## 2021-06-10 RX ADMIN — OXYCODONE HYDROCHLORIDE 10 MILLIGRAM(S): 5 TABLET ORAL at 10:31

## 2021-06-10 RX ADMIN — Medication 0.5 MILLIGRAM(S): at 10:31

## 2021-06-10 RX ADMIN — NAFCILLIN 200 GRAM(S): 10 INJECTION, POWDER, FOR SOLUTION INTRAVENOUS at 21:29

## 2021-06-10 RX ADMIN — Medication 1000 MILLIGRAM(S): at 21:29

## 2021-06-10 NOTE — PROGRESS NOTE ADULT - SUBJECTIVE AND OBJECTIVE BOX
FLOWER MARISCAL  65y  Male      Patient is a 65y old  Male who presents with a chief complaint of Septic arthritis (09 Jun 2021 13:06)      INTERVAL HPI/OVERNIGHT EVENTS:      REVIEW OF SYSTEMS:  CONSTITUTIONAL: No fever, weight loss, or fatigue  RESPIRATORY: No cough, wheezing, chills or hemoptysis; No shortness of breath  CARDIOVASCULAR: No chest pain, palpitations, dizziness, or leg swelling  GASTROINTESTINAL: No abdominal or epigastric pain. No nausea, vomiting, or hematemesis; No diarrhea or constipation. No melena or hematochezia.  GENITOURINARY: No dysuria, frequency, hematuria, or incontinence  NEUROLOGICAL: No headaches, memory loss, loss of strength, numbness, or tremors  SKIN: No itching, burning, rashes, or lesions   MUSCULOSKELETAL: No joint pain or swelling; No muscle, back, or extremity pain  PSYCHIATRIC: No depression, anxiety, mood swings, or difficulty sleeping  All other review of systems negative    VITALS  T(C): 36.1 (06-10-21 @ 05:25), Max: 36.7 (06-09-21 @ 14:02)  HR: 84 (06-10-21 @ 05:25) (74 - 84)  BP: 104/52 (06-10-21 @ 05:25) (98/57 - 112/57)  RR: 18 (06-10-21 @ 05:25) (18 - 18)  SpO2: 97% (06-10-21 @ 05:25) (97% - 97%)  Wt(kg): --Vital Signs Last 24 Hrs  T(C): 36.1 (10 Nael 2021 05:25), Max: 36.7 (09 Jun 2021 14:02)  T(F): 97 (10 Nael 2021 05:25), Max: 98.1 (09 Jun 2021 14:02)  HR: 84 (10 Nael 2021 05:25) (74 - 84)  BP: 104/52 (10 Nael 2021 05:25) (98/57 - 112/57)  BP(mean): --  RR: 18 (10 Nael 2021 05:25) (18 - 18)  SpO2: 97% (10 Nael 2021 05:25) (97% - 97%)      06-09-21 @ 07:01  -  06-10-21 @ 07:00  --------------------------------------------------------  IN: 360 mL / OUT: 400 mL / NET: -40 mL        PHYSICAL EXAM:  GENERAL: middle-age M-appears chronically ill appearing, NAD, non toxic  EYES: anicteric sclera, non injected conjunctiva, EOMI  ENT: hearing grossly intact, oropharynx clear, MMM, fair dentition  PSYCH: no agitation, baseline mentation  NERVOUS SYSTEM:  Alert & Oriented X3, CN 2-12 grossly intact  PULMONARY: Clear to percussion bilaterally; No rales, rhonchi, wheezing, or rubs  CARDIOVASCULAR: Regular rate and rhythm; No murmurs, rubs, or gallops  GI: Soft, Nontender, Nondistended; Bowel sounds present, RUQ scant dark green drainage from fistula track  EXTREMITIES:  2+ Peripheral Pulses, No clubbing, cyanosis, or edema  LYMPH: No lymphadenopathy noted  SKIN: No rashes or lesions    Consultant(s) Notes Reviewed:  [x ] YES  [ ] NO    Discussed with Consultants/Other Providers [ x] YES     LABS                          9.1    7.59  )-----------( 521      ( 10 Nael 2021 04:30 )             31.0     06-10    137  |  103  |  24<H>  ----------------------------<  118<H>  5.3<H>   |  20  |  1.0    Ca    8.2<L>      10 Nael 2021 04:30  Mg     2.0     06-10    TPro  6.6  /  Alb  2.7<L>  /  TBili  0.5  /  DBili  x   /  AST  53<H>  /  ALT  52<H>  /  AlkPhos  103  06-10    POCT Blood Glucose.: 195 mg/dL (10 Nael 2021 07:33)      RADIOLOGY & ADDITIONAL TESTS:  - no images 6/10    Imaging Personally Reviewed:  [ ] YES  [ ] NO  HEALTH ISSUES - PROBLEM Dx:  Knee pain, left  Knee pain, left      MEDICATIONS  (STANDING):  acetaminophen   Tablet .. 1000 milliGRAM(s) Oral every 8 hours  aspirin  chewable 81 milliGRAM(s) Oral daily  atorvastatin 80 milliGRAM(s) Oral at bedtime  dextrose 40% Gel 15 Gram(s) Oral once  dextrose 5%. 1000 milliLiter(s) (50 mL/Hr) IV Continuous <Continuous>  dextrose 5%. 1000 milliLiter(s) (100 mL/Hr) IV Continuous <Continuous>  dextrose 50% Injectable 25 Gram(s) IV Push once  dextrose 50% Injectable 12.5 Gram(s) IV Push once  dextrose 50% Injectable 25 Gram(s) IV Push once  digoxin     Tablet 125 MICROGram(s) Oral daily  DULoxetine 90 milliGRAM(s) Oral daily  enoxaparin Injectable 40 milliGRAM(s) SubCutaneous daily  ferrous    sulfate 325 milliGRAM(s) Oral daily  gabapentin 600 milliGRAM(s) Oral three times a day  glucagon  Injectable 1 milliGRAM(s) IntraMuscular once  insulin glargine Injectable (LANTUS) 25 Unit(s) SubCutaneous every morning  insulin lispro (ADMELOG) corrective regimen sliding scale   SubCutaneous three times a day before meals  insulin lispro Injectable (ADMELOG) 3 Unit(s) SubCutaneous three times a day before meals  metoprolol succinate ER 25 milliGRAM(s) Oral daily  nafcillin  IVPB 2 Gram(s) IV Intermittent every 4 hours  oxyCODONE  ER Tablet 20 milliGRAM(s) Oral every 12 hours  pantoprazole    Tablet 40 milliGRAM(s) Oral before breakfast  prasugrel 10 milliGRAM(s) Oral daily    MEDICATIONS  (PRN):  ALPRAZolam 0.5 milliGRAM(s) Oral every 8 hours PRN anxiety  baclofen 10 milliGRAM(s) Oral three times a day PRN Muscle Spasm  furosemide    Tablet 40 milliGRAM(s) Oral daily PRN fluid overload  ondansetron Injectable 4 milliGRAM(s) IV Push three times a day PRN Nausea and/or Vomiting  oxyCODONE    IR 10 milliGRAM(s) Oral every 4 hours PRN Severe Pain (7 - 10)        FLOWER MARISCAL  65y  Male      Patient is a 65y old  Male who presents with a chief complaint of Septic arthritis (09 Jun 2021 13:06)      INTERVAL HPI/OVERNIGHT EVENTS: resting in bed. no acute complaints. requiring frequent PRNs of pain meds.       REVIEW OF SYSTEMS:  CONSTITUTIONAL: No fever, weight loss, or fatigue  RESPIRATORY: No cough, wheezing, chills or hemoptysis; No shortness of breath  CARDIOVASCULAR: No chest pain, palpitations, dizziness, or leg swelling  GASTROINTESTINAL: No abdominal or epigastric pain. No nausea, vomiting, or hematemesis; No diarrhea or constipation. No melena or hematochezia.  GENITOURINARY: No dysuria, frequency, hematuria, or incontinence  NEUROLOGICAL: No headaches, memory loss, loss of strength, numbness, or tremors  SKIN: No itching, burning, rashes, or lesions   MUSCULOSKELETAL: No joint pain or swelling; No muscle, back, or extremity pain  PSYCHIATRIC: No depression, anxiety, mood swings, or difficulty sleeping  All other review of systems negative    VITALS  T(C): 36.1 (06-10-21 @ 05:25), Max: 36.7 (06-09-21 @ 14:02)  HR: 84 (06-10-21 @ 05:25) (74 - 84)  BP: 104/52 (06-10-21 @ 05:25) (98/57 - 112/57)  RR: 18 (06-10-21 @ 05:25) (18 - 18)  SpO2: 97% (06-10-21 @ 05:25) (97% - 97%)  Wt(kg): --Vital Signs Last 24 Hrs  T(C): 36.1 (10 Nael 2021 05:25), Max: 36.7 (09 Jun 2021 14:02)  T(F): 97 (10 Nael 2021 05:25), Max: 98.1 (09 Jun 2021 14:02)  HR: 84 (10 Nael 2021 05:25) (74 - 84)  BP: 104/52 (10 Nael 2021 05:25) (98/57 - 112/57)  BP(mean): --  RR: 18 (10 Nael 2021 05:25) (18 - 18)  SpO2: 97% (10 Nael 2021 05:25) (97% - 97%)      06-09-21 @ 07:01  -  06-10-21 @ 07:00  --------------------------------------------------------  IN: 360 mL / OUT: 400 mL / NET: -40 mL        PHYSICAL EXAM:  GENERAL: middle-age M-appears chronically ill appearing, NAD, non toxic  EYES: anicteric sclera, non injected conjunctiva, EOMI  ENT: hearing grossly intact, oropharynx clear, MMM, fair dentition  PSYCH: no agitation, baseline mentation  NERVOUS SYSTEM:  Alert & Oriented X3, CN 2-12 grossly intact  PULMONARY: Clear to percussion bilaterally; No rales, rhonchi, wheezing, or rubs  CARDIOVASCULAR: Regular rate and rhythm; No murmurs, rubs, or gallops  GI: Soft, Nontender, Nondistended; Bowel sounds present, RUQ scant dark green drainage from fistula track  EXTREMITIES:  2+ Peripheral Pulses, No clubbing, cyanosis, or edema  LYMPH: No lymphadenopathy noted  SKIN: No rashes or lesions    Consultant(s) Notes Reviewed:  [x ] YES  [ ] NO    Discussed with Consultants/Other Providers [ x] YES     LABS                          9.1    7.59  )-----------( 521      ( 10 Nael 2021 04:30 )             31.0     06-10    137  |  103  |  24<H>  ----------------------------<  118<H>  5.3<H>   |  20  |  1.0    Ca    8.2<L>      10 Nael 2021 04:30  Mg     2.0     06-10    TPro  6.6  /  Alb  2.7<L>  /  TBili  0.5  /  DBili  x   /  AST  53<H>  /  ALT  52<H>  /  AlkPhos  103  06-10    POCT Blood Glucose.: 195 mg/dL (10 Nael 2021 07:33)      RADIOLOGY & ADDITIONAL TESTS:  - no images 6/10    Imaging Personally Reviewed:  [ ] YES  [ ] NO  HEALTH ISSUES - PROBLEM Dx:  Knee pain, left  Knee pain, left      MEDICATIONS  (STANDING):  acetaminophen   Tablet .. 1000 milliGRAM(s) Oral every 8 hours  aspirin  chewable 81 milliGRAM(s) Oral daily  atorvastatin 80 milliGRAM(s) Oral at bedtime  dextrose 40% Gel 15 Gram(s) Oral once  dextrose 5%. 1000 milliLiter(s) (50 mL/Hr) IV Continuous <Continuous>  dextrose 5%. 1000 milliLiter(s) (100 mL/Hr) IV Continuous <Continuous>  dextrose 50% Injectable 25 Gram(s) IV Push once  dextrose 50% Injectable 12.5 Gram(s) IV Push once  dextrose 50% Injectable 25 Gram(s) IV Push once  digoxin     Tablet 125 MICROGram(s) Oral daily  DULoxetine 90 milliGRAM(s) Oral daily  enoxaparin Injectable 40 milliGRAM(s) SubCutaneous daily  ferrous    sulfate 325 milliGRAM(s) Oral daily  gabapentin 600 milliGRAM(s) Oral three times a day  glucagon  Injectable 1 milliGRAM(s) IntraMuscular once  insulin glargine Injectable (LANTUS) 25 Unit(s) SubCutaneous every morning  insulin lispro (ADMELOG) corrective regimen sliding scale   SubCutaneous three times a day before meals  insulin lispro Injectable (ADMELOG) 3 Unit(s) SubCutaneous three times a day before meals  metoprolol succinate ER 25 milliGRAM(s) Oral daily  nafcillin  IVPB 2 Gram(s) IV Intermittent every 4 hours  oxyCODONE  ER Tablet 20 milliGRAM(s) Oral every 12 hours  pantoprazole    Tablet 40 milliGRAM(s) Oral before breakfast  prasugrel 10 milliGRAM(s) Oral daily    MEDICATIONS  (PRN):  ALPRAZolam 0.5 milliGRAM(s) Oral every 8 hours PRN anxiety  baclofen 10 milliGRAM(s) Oral three times a day PRN Muscle Spasm  furosemide    Tablet 40 milliGRAM(s) Oral daily PRN fluid overload  ondansetron Injectable 4 milliGRAM(s) IV Push three times a day PRN Nausea and/or Vomiting  oxyCODONE    IR 10 milliGRAM(s) Oral every 4 hours PRN Severe Pain (7 - 10)

## 2021-06-10 NOTE — PROGRESS NOTE ADULT - ASSESSMENT
66 yo male admitted with left septic knee and severe PVD    # Left Knee Septic Arthritis with MSSA bacteremia- resolving  - repeat Blood Cx negative   - S/P left knee wash out by ortho  - Ortho following; recalled yesterday for L knee pain; knee xray as above, grossly unchanged from prior MRI. Will see pt again today          Left knee lateral drain removed (6/1), Medial knee drain and occlusive dressing removed on 6/3  - ID on board     ESR/CRP are improving      continue nafcillin 2g q 4 hours     will need 6 weeks from time of last debridement (5/27-7/7) -- to finish with cefazolin 2g q 8 hours     Weekly CBC, CMP, ESR/CRP     ID follow-up with Dr. Woody Cruz for Telehealth. We will call the patient between 10:30-1:30      [address]       [phone]       Fax [phone]  - PICC line placed on 6/4  - pain management recalled for left knee pain       Continue with oxycodone ER 20mg q12h        Continue with oxycodone IR 10mg q4h PRN for severe pain; patient has not been getting this dosing schedule and has requested IV morphine       D/C IV morphine       Continue with other non-opioid pharmacotherapy to reduce opioid requirements.       #Biliary Drainage from previous perc sudhir site- resolved   - Previous Intraabdominal Cx 9/4/2020 -- VRE faecium and pseudomonas aeruginosa  - Wound Cx 5/1 growing Pseudomonas/VRE Faecium  - CT Abdomen and Pelvis w/ IV Cont (04.26.21 @ 04:42): No evidence of acute intra-abdominal pathology. Decreased area of right upper quadrant subcutaneous stranding at site of prior percutaneous cholecystostomy, without evidence of abscess  - HIDA scan unable to visualize contrast in GB   - spoke w/ Dr. Tello 5/3 after HIDA who recommended outpatient ERCP and cholecystectomy to divert bile and heal fistula  - At this time RUQ tract appears to be closed and well healed w/o active drainage   - LFT's unremarkable   - spoke w/ Dr. Tello again on 6/3 and 6/9 - no plan for acute intervention, OP follow up in 1-2 weeks. Upon discharge please contact [facility] to schedule follow up for ERCP w/stent placement. Will need to be off AC and DAPT 5 days prior to procedure.    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  - No further intervention per Ortho     # PVD  - Arterial doppler 5/29 of bilateral LE show normal arterial flow in the bilateral lower extremities. Limited exam due to bandages  - Vascular Cardiology team following, no further intervention   c/w ASA and Effient and high intensity statin   c/w GDMT for HFrEF     # DM2 c/b hyperglycemia- well controlled  - Lantus 25u in the morning  - cont lispro 3 tid w/ meals      #Microcytic Anemia   - Iron def + ACD?, no evidence of GI bleed  - will start iron supplementation  - o/p GI f/u    #CAD s/p PCI to RCA and OM1, s/p LIMA to LAD (2018)   #Severe ischemic cardiomyopathy w/ reduced EF s/p CRT-D   #HTN   #DLD   - JOHN PAUL 5/2021 w/ ef 20-25%  - on ASA and Effient   - restarted on high intensity statin  - c/w GDMT (Toprol, Aldactone, Digoxn, Lasix prn ) for HFrEF     # Covid vaccine status:   He has received one dose and is supposed to receive the second dose on 5/15/21 but he was already hospitalized. Can receive 2nd dose anytime after leaving hospital.    # DVT ppx: Lovenox  # GI ppx: PPI from home  # Diet: DASH  # Code Status: Full Code  # Dispo: awaiting auth for STR, anticipated for tomorrow    #Progress Note Handoff  Pending (specify): pain control and rehab placement  Family discussion: had an extensive conversation today eith patient regarding medical updates and plan of care.  Disposition: Unknown at this time________

## 2021-06-10 NOTE — ADVANCED PRACTICE NURSE CONSULT - ASSESSMENT
65 year old male with PMH of CAD s/p MI, PCI/CABG, CHFrEF (15-20%), has ICD, HTN, celiac disease, DM, neuropathy, chronic b/l LE ulcers presents, severe chronic pain,  hx infected R TKR with MRSA (was eventually cemented so has limited ROM R knee), and history of cholecystostomy tube placement in February 2020 for acute cholecystitis s/p removal in May 2020 with multiple presentations including persistent bilious drainage from the prior tract site as well as a RUQ abdominal abscess at the site s/p drainage. He presented to ED (4/27) with inability to walk/ambulate 2/2 left knee pain. Pt has hx of knee pain and infections. 2 weeks ago pt received a 'steroid' shot for his left knee for pain. 2 days later, his knee began to swell and he was unable to walk or bend the knee. It progressively gotten worse to the point where the pain was unbearable so he presented to the ED.   In the ED:  Joint was aspirated cloudy yellow fluid was seen and sent for cultures. Neutrophil count >50,000 and RBC >8000. Ortho consulted for septic arthritis, s/p arthroscopic drainage. Surg consulted for bile drainage, HIDA scan, consult GI, possible ERCP. CT A&P, No evidence of acute intra-abdominal pathology. Decreased area of right upper quadrant subcutaneous stranding at site of prior percutaneous cholecystostomy, without evidence of abscess. Vitally stable. Admitted to medicine for medical management.    Patient  admitted with left septic knee and severe PVD; today is hospital day 45d; currently on 4A, being managed for  Left Knee Septic Arthritis with MSSA bacteremia- resolving-s/p  left knee wash out by ortho;  Left knee lateral drain removed (6/1), Medial knee drain and occlusive dressing removed on 6/3; Biliary Drainage from previous perc sudhir site- resolved; PJI of Right knee- resolved; PVD; # DM2 c/b hyperglycemia- well controlled; CAD s/p PCI to RCA and OM1, s/p LIMA to LAD (2018); Severe ischemic cardiomyopathy w/ reduced EF s/p CRT-D; HTN; DLD.  Patient seen by WOCN 3/2021 during previous admission.    Received patient  on 4A, sitting up in bed, awake, A&Ox3, made aware of purpose of WOCN visit, agreeable to consult.   Dressing to left knee in place-managed by Ortho MD team. Dry intact scab to left shin.     Healing full thickness wound to right knee ~3cm x 1cm x 0.1cm, wound bed dry, scabbing over- previous graft site-being managed by Burn MD team.      Skin tears x 2 w/o epidermal flap to right shin, ~0.5cm x 0.5cm x 0.1cm, wound bed w/ dark pink tissue, edges attached, periwound intact, minimal amount of sanguinous drainage present from wound bed, no odor, induration, erythema, warmth, or c/o pain present,     Patient able to turn to left side for buttock skin assessment. Fungal rash to b/l buttock & posterior thighs-skin denuded w/ dark pink hyperpigmentation throughout, satellite lesions & patches of peeling epidermal layer to periphery.     Patient OOB w/ assistance, able to independently turn/position in bed/chair, reports continent of urine & stool, ordered for diabetic diet; adequate intake per reported Freddie score.

## 2021-06-10 NOTE — PROGRESS NOTE ADULT - SUBJECTIVE AND OBJECTIVE BOX
SUBJ: Patient seen and examined. No events overnight.       MEDICATIONS  (STANDING):  acetaminophen   Tablet .. 1000 milliGRAM(s) Oral every 8 hours  aspirin  chewable 81 milliGRAM(s) Oral daily  atorvastatin 80 milliGRAM(s) Oral at bedtime  dextrose 40% Gel 15 Gram(s) Oral once  dextrose 5%. 1000 milliLiter(s) (50 mL/Hr) IV Continuous <Continuous>  dextrose 5%. 1000 milliLiter(s) (100 mL/Hr) IV Continuous <Continuous>  dextrose 50% Injectable 25 Gram(s) IV Push once  dextrose 50% Injectable 12.5 Gram(s) IV Push once  dextrose 50% Injectable 25 Gram(s) IV Push once  digoxin     Tablet 125 MICROGram(s) Oral daily  DULoxetine 90 milliGRAM(s) Oral daily  enoxaparin Injectable 40 milliGRAM(s) SubCutaneous daily  ferrous    sulfate 325 milliGRAM(s) Oral daily  gabapentin 600 milliGRAM(s) Oral three times a day  glucagon  Injectable 1 milliGRAM(s) IntraMuscular once  insulin glargine Injectable (LANTUS) 25 Unit(s) SubCutaneous every morning  insulin lispro (ADMELOG) corrective regimen sliding scale   SubCutaneous three times a day before meals  insulin lispro Injectable (ADMELOG) 3 Unit(s) SubCutaneous three times a day before meals  metoprolol succinate ER 25 milliGRAM(s) Oral daily  nafcillin  IVPB 2 Gram(s) IV Intermittent every 4 hours  oxyCODONE  ER Tablet 20 milliGRAM(s) Oral every 12 hours  pantoprazole    Tablet 40 milliGRAM(s) Oral before breakfast  prasugrel 10 milliGRAM(s) Oral daily    MEDICATIONS  (PRN):  ALPRAZolam 0.5 milliGRAM(s) Oral every 8 hours PRN anxiety  baclofen 10 milliGRAM(s) Oral three times a day PRN Muscle Spasm  furosemide    Tablet 40 milliGRAM(s) Oral daily PRN fluid overload  ondansetron Injectable 4 milliGRAM(s) IV Push three times a day PRN Nausea and/or Vomiting  oxyCODONE    IR 10 milliGRAM(s) Oral every 4 hours PRN Severe Pain (7 - 10)            Vital Signs Last 24 Hrs  T(C): 35.7 (10 Nael 2021 14:35), Max: 36.1 (09 Jun 2021 21:41)  T(F): 96.2 (10 Nael 2021 14:35), Max: 97 (09 Jun 2021 21:41)  HR: 71 (10 Nael 2021 14:35) (71 - 84)  BP: 115/60 (10 Nael 2021 14:35) (98/57 - 115/60)  BP(mean): --  RR: 18 (10 Nael 2021 14:35) (18 - 18)  SpO2: 97% (10 Nael 2021 05:25) (97% - 97%)     REVIEW OF SYSTEMS:  CONSTITUTIONAL: No fever  NECK: No pain or stiffness  CARDIOVASCULAR: patient denies chest pain, shortness of breath or palpitations .  Repiratory: No cough or wheezing.  NEUROLOGICAL: No focal deficits to report.  GI: no BRBPR, no N,V,diarrhea.    PSYCHIATRY: normal mood and affect  HEENT: no nasal discharge, no ecchymosis        PHYSICAL EXAM:  GENERAL: NAD  HEAD:  Atraumatic, Normocephalic  NECK: Supple, No JVD  NERVOUS SYSTEM:  Alert & Oriented X3, Good concentration  CHEST/LUNG: Clear to anterior auscultation bilaterally  HEART: Regular rate and rhythm  ABDOMEN: Soft, Nontender, Nondistended;         LABS:                        9.1    7.59  )-----------( 521      ( 10 Nael 2021 04:30 )             31.0     06-10    137  |  103  |  24<H>  ----------------------------<  118<H>  5.3<H>   |  20  |  1.0    Ca    8.2<L>      10 Nael 2021 04:30  Mg     2.0     06-10    TPro  6.6  /  Alb  2.7<L>  /  TBili  0.5  /  DBili  x   /  AST  53<H>  /  ALT  52<H>  /  AlkPhos  103  06-10            I&O's Summary    09 Jun 2021 07:01  -  10 Nael 2021 07:00  --------------------------------------------------------  IN: 360 mL / OUT: 400 mL / NET: -40 mL      BNP  RADIOLOGY & ADDITIONAL STUDIES:    IMPRESSION AND PLAN:    CAD s/p PCI to RCA and OM1, s/p LIMA to LAD (2018)   Severe ischemic cardiomyopathy (EF 15-20%) s/p CRT-D - currently compensated  DMII, HTN, DL  Septic arthritis of the knee s/p drainage, irrigation and debridement  - Management as per primary team  - c/w ASA, Effient and Atorvastatin  - c/w GDMT for HFrEF   - Will F/U

## 2021-06-10 NOTE — ADVANCED PRACTICE NURSE CONSULT - RECOMMEDATIONS
1. Skin tears right shin--Cleanse wound beds w/ normal saline, gently pat dry. Apply non-adherent Adaptic dressing then cover dry gauze dressing, secure w/ tape or tegaderm. Change dressings daily and prn for strike-through drainage or soiling.  2. Fungal rash b/l buttock- Cleanse skin w/ perineal cleanser, gently pat dry. Apply Nystatin cream & Coloplast Diamond moisture barrier q12h & prn after any soiling.   -Assess skin/wound qshift, report changes to primary provider.     Additional recs:   -Continue to instruct/encourage & asssist patient as needed w/ turning/positioning patient from side-to-side q2h while in bed, q1h when/if OOB chair, or in accordance w/ pt's plan of care. Utilize pillows as needed to assist w/ turning/positioning. When/if OOB chair, utilize pillows or chair cushion to offload pressure.   -Continue utilizing only one underpad underneath patient to wick away moisture from skin surface & contain any soiling; change pad when saturated/soiled.    -Continue tight glucose control for optimal wound healing.     Plan of Care: Primary RN Martha made aware of above recs. Spoke w/ covering/primary MD Vela (at [phone]) in regards to above. Signing off on patient, no further needs/recs from [facility] service at this time. Staff RN to perform routine skin/wound assessment and manage wound care. Questions or concerns or if wound worsens and reconsult needed, please contact [facility], [phone].

## 2021-06-11 LAB
ANION GAP SERPL CALC-SCNC: 8 MMOL/L — SIGNIFICANT CHANGE UP (ref 7–14)
BUN SERPL-MCNC: 23 MG/DL — HIGH (ref 10–20)
CALCIUM SERPL-MCNC: 8 MG/DL — LOW (ref 8.5–10.1)
CHLORIDE SERPL-SCNC: 105 MMOL/L — SIGNIFICANT CHANGE UP (ref 98–110)
CO2 SERPL-SCNC: 27 MMOL/L — SIGNIFICANT CHANGE UP (ref 17–32)
CREAT SERPL-MCNC: 0.9 MG/DL — SIGNIFICANT CHANGE UP (ref 0.7–1.5)
CRP SERPL-MCNC: 54 MG/L — HIGH
GLUCOSE BLDC GLUCOMTR-MCNC: 140 MG/DL — HIGH (ref 70–99)
GLUCOSE BLDC GLUCOMTR-MCNC: 163 MG/DL — HIGH (ref 70–99)
GLUCOSE BLDC GLUCOMTR-MCNC: 201 MG/DL — HIGH (ref 70–99)
GLUCOSE BLDC GLUCOMTR-MCNC: 78 MG/DL — SIGNIFICANT CHANGE UP (ref 70–99)
GLUCOSE SERPL-MCNC: 85 MG/DL — SIGNIFICANT CHANGE UP (ref 70–99)
HCT VFR BLD CALC: 28.7 % — LOW (ref 42–52)
HGB BLD-MCNC: 8.4 G/DL — LOW (ref 14–18)
MAGNESIUM SERPL-MCNC: 2 MG/DL — SIGNIFICANT CHANGE UP (ref 1.8–2.4)
MCHC RBC-ENTMCNC: 22.5 PG — LOW (ref 27–31)
MCHC RBC-ENTMCNC: 29.3 G/DL — LOW (ref 32–37)
MCV RBC AUTO: 76.7 FL — LOW (ref 80–94)
NRBC # BLD: 0 /100 WBCS — SIGNIFICANT CHANGE UP (ref 0–0)
PHOSPHATE SERPL-MCNC: 4.3 MG/DL — SIGNIFICANT CHANGE UP (ref 2.1–4.9)
PLATELET # BLD AUTO: 526 K/UL — HIGH (ref 130–400)
POTASSIUM SERPL-MCNC: 4.2 MMOL/L — SIGNIFICANT CHANGE UP (ref 3.5–5)
POTASSIUM SERPL-SCNC: 4.2 MMOL/L — SIGNIFICANT CHANGE UP (ref 3.5–5)
RBC # BLD: 3.74 M/UL — LOW (ref 4.7–6.1)
RBC # FLD: 25.3 % — HIGH (ref 11.5–14.5)
SODIUM SERPL-SCNC: 140 MMOL/L — SIGNIFICANT CHANGE UP (ref 135–146)
WBC # BLD: 6.3 K/UL — SIGNIFICANT CHANGE UP (ref 4.8–10.8)
WBC # FLD AUTO: 6.3 K/UL — SIGNIFICANT CHANGE UP (ref 4.8–10.8)

## 2021-06-11 PROCEDURE — 99233 SBSQ HOSP IP/OBS HIGH 50: CPT

## 2021-06-11 RX ORDER — NYSTATIN CREAM 100000 [USP'U]/G
1 CREAM TOPICAL
Refills: 0 | Status: DISCONTINUED | OUTPATIENT
Start: 2021-06-11 | End: 2021-06-15

## 2021-06-11 RX ADMIN — Medication 10 MILLIGRAM(S): at 01:25

## 2021-06-11 RX ADMIN — OXYCODONE HYDROCHLORIDE 10 MILLIGRAM(S): 5 TABLET ORAL at 01:25

## 2021-06-11 RX ADMIN — NAFCILLIN 200 GRAM(S): 10 INJECTION, POWDER, FOR SOLUTION INTRAVENOUS at 21:59

## 2021-06-11 RX ADMIN — Medication 325 MILLIGRAM(S): at 11:16

## 2021-06-11 RX ADMIN — NAFCILLIN 200 GRAM(S): 10 INJECTION, POWDER, FOR SOLUTION INTRAVENOUS at 14:40

## 2021-06-11 RX ADMIN — INSULIN GLARGINE 25 UNIT(S): 100 INJECTION, SOLUTION SUBCUTANEOUS at 08:02

## 2021-06-11 RX ADMIN — Medication 1000 MILLIGRAM(S): at 16:15

## 2021-06-11 RX ADMIN — NAFCILLIN 200 GRAM(S): 10 INJECTION, POWDER, FOR SOLUTION INTRAVENOUS at 09:41

## 2021-06-11 RX ADMIN — Medication 1000 MILLIGRAM(S): at 05:05

## 2021-06-11 RX ADMIN — Medication 3 UNIT(S): at 11:42

## 2021-06-11 RX ADMIN — Medication 0.5 MILLIGRAM(S): at 22:03

## 2021-06-11 RX ADMIN — DULOXETINE HYDROCHLORIDE 90 MILLIGRAM(S): 30 CAPSULE, DELAYED RELEASE ORAL at 11:16

## 2021-06-11 RX ADMIN — GABAPENTIN 600 MILLIGRAM(S): 400 CAPSULE ORAL at 22:00

## 2021-06-11 RX ADMIN — NAFCILLIN 200 GRAM(S): 10 INJECTION, POWDER, FOR SOLUTION INTRAVENOUS at 01:30

## 2021-06-11 RX ADMIN — Medication 1000 MILLIGRAM(S): at 15:09

## 2021-06-11 RX ADMIN — ENOXAPARIN SODIUM 40 MILLIGRAM(S): 100 INJECTION SUBCUTANEOUS at 11:15

## 2021-06-11 RX ADMIN — PANTOPRAZOLE SODIUM 40 MILLIGRAM(S): 20 TABLET, DELAYED RELEASE ORAL at 06:04

## 2021-06-11 RX ADMIN — GABAPENTIN 600 MILLIGRAM(S): 400 CAPSULE ORAL at 05:59

## 2021-06-11 RX ADMIN — Medication 3 UNIT(S): at 16:58

## 2021-06-11 RX ADMIN — Medication 125 MICROGRAM(S): at 05:59

## 2021-06-11 RX ADMIN — GABAPENTIN 600 MILLIGRAM(S): 400 CAPSULE ORAL at 14:39

## 2021-06-11 RX ADMIN — OXYCODONE HYDROCHLORIDE 20 MILLIGRAM(S): 5 TABLET ORAL at 18:03

## 2021-06-11 RX ADMIN — NAFCILLIN 200 GRAM(S): 10 INJECTION, POWDER, FOR SOLUTION INTRAVENOUS at 18:04

## 2021-06-11 RX ADMIN — Medication 25 MILLIGRAM(S): at 06:00

## 2021-06-11 RX ADMIN — NYSTATIN CREAM 1 APPLICATION(S): 100000 CREAM TOPICAL at 14:40

## 2021-06-11 RX ADMIN — NAFCILLIN 200 GRAM(S): 10 INJECTION, POWDER, FOR SOLUTION INTRAVENOUS at 05:05

## 2021-06-11 RX ADMIN — Medication 1000 MILLIGRAM(S): at 23:02

## 2021-06-11 RX ADMIN — Medication 10 MILLIGRAM(S): at 22:01

## 2021-06-11 RX ADMIN — Medication 1000 MILLIGRAM(S): at 06:33

## 2021-06-11 RX ADMIN — Medication 81 MILLIGRAM(S): at 11:16

## 2021-06-11 RX ADMIN — PRASUGREL 10 MILLIGRAM(S): 5 TABLET, FILM COATED ORAL at 11:16

## 2021-06-11 RX ADMIN — OXYCODONE HYDROCHLORIDE 20 MILLIGRAM(S): 5 TABLET ORAL at 05:05

## 2021-06-11 RX ADMIN — Medication 2: at 11:42

## 2021-06-11 RX ADMIN — ATORVASTATIN CALCIUM 80 MILLIGRAM(S): 80 TABLET, FILM COATED ORAL at 22:00

## 2021-06-11 RX ADMIN — OXYCODONE HYDROCHLORIDE 20 MILLIGRAM(S): 5 TABLET ORAL at 06:34

## 2021-06-11 RX ADMIN — Medication 1000 MILLIGRAM(S): at 22:00

## 2021-06-11 NOTE — PROGRESS NOTE ADULT - SUBJECTIVE AND OBJECTIVE BOX
Pt. was seen on 6/10/21 but lab results were not available until today, 6/11/21    PE: slight improvement in pain  Pt demonstrated better mobility of LLE/ better effort  Incisions sealed with scabs, no drainage

## 2021-06-11 NOTE — PROGRESS NOTE ADULT - ASSESSMENT
64 yo male admitted with left septic knee and severe PVD    # Left Knee Septic Arthritis with MSSA bacteremia- resolving  - repeat Blood Cx negative   - S/P left knee wash out by ortho  - Ortho following; recalled yesterday for L knee pain; knee xray as above, grossly unchanged from prior MRI. Will see pt again today          Left knee lateral drain removed (6/1), Medial knee drain and occlusive dressing removed on 6/3  - ID on board     ESR/CRP are improving      continue nafcillin 2g q 4 hours     will need 6 weeks from time of last debridement (5/27-7/7) -- to finish with cefazolin 2g q 8 hours     Weekly CBC, CMP, ESR/CRP     ID follow-up with Dr. Woody Cruz for Telehealth. We will call the patient between 10:30-1:30      [address]       [phone]       Fax [phone]  - PICC line placed on 6/4  - pain management recalled for left knee pain       Continue with oxycodone ER 20mg q12h        Continue with oxycodone IR 10mg q4h PRN for severe pain; patient has not been getting this dosing schedule and has requested IV morphine       D/C IV morphine       Continue with other non-opioid pharmacotherapy to reduce opioid requirements.       #Biliary Drainage from previous perc sudhir site- resolved   - Previous Intraabdominal Cx 9/4/2020 -- VRE faecium and pseudomonas aeruginosa  - Wound Cx 5/1 growing Pseudomonas/VRE Faecium  - CT Abdomen and Pelvis w/ IV Cont (04.26.21 @ 04:42): No evidence of acute intra-abdominal pathology. Decreased area of right upper quadrant subcutaneous stranding at site of prior percutaneous cholecystostomy, without evidence of abscess  - HIDA scan unable to visualize contrast in GB   - spoke w/ Dr. Tello 5/3 after HIDA who recommended outpatient ERCP and cholecystectomy to divert bile and heal fistula  - At this time RUQ tract appears to be closed and well healed w/o active drainage   - LFT's unremarkable   - spoke w/ Dr. Tello again on 6/3 and 6/9 - no plan for acute intervention, OP follow up in 1-2 weeks. Upon discharge please contact [facility] to schedule follow up for ERCP w/stent placement. Will need to be off AC and DAPT 5 days prior to procedure.    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  - No further intervention per Ortho     # PVD  - Arterial doppler 5/29 of bilateral LE show normal arterial flow in the bilateral lower extremities. Limited exam due to bandages  - Vascular Cardiology team following, no further intervention   c/w ASA and Effient and high intensity statin   c/w GDMT for HFrEF     # DM2 c/b hyperglycemia- well controlled  - Lantus 25u in the morning  - cont lispro 3 tid w/ meals      #Microcytic Anemia   - Iron def + ACD?, no evidence of GI bleed  - will start iron supplementation  - o/p GI f/u    #CAD s/p PCI to RCA and OM1, s/p LIMA to LAD (2018)   #Severe ischemic cardiomyopathy w/ reduced EF s/p CRT-D   #HTN   #DLD   - JOHN PAUL 5/2021 w/ ef 20-25%  - on ASA and Effient   - restarted on high intensity statin  - c/w GDMT (Toprol, Aldactone, Digoxn, Lasix prn ) for HFrEF     # Covid vaccine status:   He has received one dose and is supposed to receive the second dose on 5/15/21 but he was already hospitalized. Can receive 2nd dose anytime after leaving hospital.    # DVT ppx: Lovenox  # GI ppx: PPI from home  # Diet: DASH  # Code Status: Full Code  # Dispo: awaiting auth for STR, anticipated for tomorrow    #Progress Note Handoff  Pending (specify): insurance auth  Family discussion: had an extensive conversation today eith patient regarding medical updates and plan of care.  Disposition: Unknown at this time________

## 2021-06-11 NOTE — PROGRESS NOTE ADULT - ASSESSMENT
Labs/ vitals reviewed today  ESR trending down, CRP slightly increased - needs trending  Again discussed plan/ prognosis with patient during examination (6/10/21)  Will follow

## 2021-06-11 NOTE — PROGRESS NOTE ADULT - SUBJECTIVE AND OBJECTIVE BOX
FLOWER MARISCAL  65y  Male      Patient is a 65y old  Male who presents with a chief complaint of Septic arthritis (10 Nael 2021 11:32)      INTERVAL HPI/OVERNIGHT EVENTS:      REVIEW OF SYSTEMS:  CONSTITUTIONAL: No fever, weight loss, or fatigue  RESPIRATORY: No cough, wheezing, chills or hemoptysis; No shortness of breath  CARDIOVASCULAR: No chest pain, palpitations, dizziness, or leg swelling  GASTROINTESTINAL: No abdominal or epigastric pain. No nausea, vomiting, or hematemesis; No diarrhea or constipation. No melena or hematochezia.  GENITOURINARY: No dysuria, frequency, hematuria, or incontinence  NEUROLOGICAL: No headaches, memory loss, loss of strength, numbness, or tremors  SKIN: No itching, burning, rashes, or lesions   MUSCULOSKELETAL: No joint pain or swelling; No muscle, back, or extremity pain  PSYCHIATRIC: No depression, anxiety, mood swings, or difficulty sleeping  All other review of systems negative    T(C): 35.8 (06-11-21 @ 05:32), Max: 36.2 (06-10-21 @ 22:14)  HR: 74 (06-11-21 @ 05:32) (71 - 78)  BP: 109/58 (06-11-21 @ 05:32) (109/56 - 115/60)  RR: 18 (06-11-21 @ 05:32) (18 - 18)  SpO2: --  Wt(kg): --Vital Signs Last 24 Hrs  T(C): 35.8 (11 Jun 2021 05:32), Max: 36.2 (10 Nael 2021 22:14)  T(F): 96.4 (11 Jun 2021 05:32), Max: 97.2 (10 Nael 2021 22:14)  HR: 74 (11 Jun 2021 05:32) (71 - 78)  BP: 109/58 (11 Jun 2021 05:32) (109/56 - 115/60)  BP(mean): --  RR: 18 (11 Jun 2021 05:32) (18 - 18)  SpO2: --      06-10-21 @ 07:01  -  06-11-21 @ 07:00  --------------------------------------------------------  IN: 0 mL / OUT: 300 mL / NET: -300 mL    06-11-21 @ 07:01  -  06-11-21 @ 11:08  --------------------------------------------------------  IN: 100 mL / OUT: 0 mL / NET: 100 mL        PHYSICAL EXAM:  GENERAL: NAD, well-groomed, well-developed  PSYCH: no agitation, baseline mentation  NERVOUS SYSTEM:  Alert & Oriented X3, Motor Strength 5/5 B/L upper and lower extremities; Sensory intact; FTN WNL  PULMONARY: Clear to percussion bilaterally; No rales, rhonchi, wheezing, or rubs  CARDIOVASCULAR: Regular rate and rhythm; No murmurs, rubs, or gallops  GI: Soft, Nontender, Nondistended; Bowel sounds present  EXTREMITIES:  2+ Peripheral Pulses, No clubbing, cyanosis, or edema  LYMPH: No lymphadenopathy noted  SKIN: No rashes or lesions    Consultant(s) Notes Reviewed:  [x ] YES  [ ] NO    Discussed with Consultants/Other Providers [ x] YES     LABS                          8.4    6.30  )-----------( 526      ( 11 Jun 2021 06:04 )             28.7     06-11    140  |  105  |  23<H>  ----------------------------<  85  4.2   |  27  |  0.9    Ca    8.0<L>      11 Jun 2021 06:04  Phos  4.3     06-11  Mg     2.0     06-11    TPro  6.6  /  Alb  2.7<L>  /  TBili  0.5  /  DBili  x   /  AST  53<H>  /  ALT  52<H>  /  AlkPhos  103  06-10      POCT Blood Glucose.: 78 mg/dL (11 Jun 2021 07:37)      RADIOLOGY & ADDITIONAL TESTS:  - no images 6/11    HEALTH ISSUES - PROBLEM Dx:  Knee pain, left      MEDICATIONS  (STANDING):  acetaminophen   Tablet .. 1000 milliGRAM(s) Oral every 8 hours  aspirin  chewable 81 milliGRAM(s) Oral daily  atorvastatin 80 milliGRAM(s) Oral at bedtime  dextrose 40% Gel 15 Gram(s) Oral once  dextrose 5%. 1000 milliLiter(s) (50 mL/Hr) IV Continuous <Continuous>  dextrose 5%. 1000 milliLiter(s) (100 mL/Hr) IV Continuous <Continuous>  dextrose 50% Injectable 25 Gram(s) IV Push once  dextrose 50% Injectable 12.5 Gram(s) IV Push once  dextrose 50% Injectable 25 Gram(s) IV Push once  digoxin     Tablet 125 MICROGram(s) Oral daily  DULoxetine 90 milliGRAM(s) Oral daily  enoxaparin Injectable 40 milliGRAM(s) SubCutaneous daily  ferrous    sulfate 325 milliGRAM(s) Oral daily  gabapentin 600 milliGRAM(s) Oral three times a day  glucagon  Injectable 1 milliGRAM(s) IntraMuscular once  insulin glargine Injectable (LANTUS) 25 Unit(s) SubCutaneous every morning  insulin lispro (ADMELOG) corrective regimen sliding scale   SubCutaneous three times a day before meals  insulin lispro Injectable (ADMELOG) 3 Unit(s) SubCutaneous three times a day before meals  metoprolol succinate ER 25 milliGRAM(s) Oral daily  nafcillin  IVPB 2 Gram(s) IV Intermittent every 4 hours  nystatin Cream 1 Application(s) Topical two times a day  oxyCODONE  ER Tablet 20 milliGRAM(s) Oral every 12 hours  pantoprazole    Tablet 40 milliGRAM(s) Oral before breakfast  prasugrel 10 milliGRAM(s) Oral daily    MEDICATIONS  (PRN):  ALPRAZolam 0.5 milliGRAM(s) Oral every 8 hours PRN anxiety  baclofen 10 milliGRAM(s) Oral three times a day PRN Muscle Spasm  furosemide    Tablet 40 milliGRAM(s) Oral daily PRN fluid overload  ondansetron Injectable 4 milliGRAM(s) IV Push three times a day PRN Nausea and/or Vomiting  oxyCODONE    IR 10 milliGRAM(s) Oral every 4 hours PRN Severe Pain (7 - 10)     FLOWER MARISCAL  65y  Male      Patient is a 65y old  Male who presents with a chief complaint of Septic arthritis (10 Nael 2021 11:32)      INTERVAL HPI/OVERNIGHT EVENTS: no acute events overnight. pain well controlled. no nurisng concerns.      REVIEW OF SYSTEMS:  CONSTITUTIONAL: No fever, weight loss, or fatigue  RESPIRATORY: No cough, wheezing, chills or hemoptysis; No shortness of breath  CARDIOVASCULAR: No chest pain, palpitations, dizziness, or leg swelling  GASTROINTESTINAL: No abdominal or epigastric pain. No nausea, vomiting, or hematemesis; No diarrhea or constipation. No melena or hematochezia.  GENITOURINARY: No dysuria, frequency, hematuria, or incontinence  NEUROLOGICAL: No headaches, memory loss, loss of strength, numbness, or tremors  SKIN: No itching, burning, rashes, or lesions   MUSCULOSKELETAL: No joint pain or swelling; No muscle, back, or extremity pain  PSYCHIATRIC: No depression, anxiety, mood swings, or difficulty sleeping  All other review of systems negative    T(C): 35.8 (06-11-21 @ 05:32), Max: 36.2 (06-10-21 @ 22:14)  HR: 74 (06-11-21 @ 05:32) (71 - 78)  BP: 109/58 (06-11-21 @ 05:32) (109/56 - 115/60)  RR: 18 (06-11-21 @ 05:32) (18 - 18)  SpO2: --  Wt(kg): --Vital Signs Last 24 Hrs  T(C): 35.8 (11 Jun 2021 05:32), Max: 36.2 (10 Nael 2021 22:14)  T(F): 96.4 (11 Jun 2021 05:32), Max: 97.2 (10 Nael 2021 22:14)  HR: 74 (11 Jun 2021 05:32) (71 - 78)  BP: 109/58 (11 Jun 2021 05:32) (109/56 - 115/60)  BP(mean): --  RR: 18 (11 Jun 2021 05:32) (18 - 18)  SpO2: --      06-10-21 @ 07:01  -  06-11-21 @ 07:00  --------------------------------------------------------  IN: 0 mL / OUT: 300 mL / NET: -300 mL    06-11-21 @ 07:01  -  06-11-21 @ 11:08  --------------------------------------------------------  IN: 100 mL / OUT: 0 mL / NET: 100 mL        PHYSICAL EXAM:  GENERAL: middle-age M-appears chronically ill appearing, NAD, non toxic  EYES: anicteric sclera, non injected conjunctiva, EOMI  ENT: hearing grossly intact, oropharynx clear, MMM, fair dentition  PSYCH: no agitation, baseline mentation  NERVOUS SYSTEM:  Alert & Oriented X3, CN 2-12 grossly intact  PULMONARY: Clear to percussion bilaterally; No rales, rhonchi, wheezing, or rubs  CARDIOVASCULAR: Regular rate and rhythm; No murmurs, rubs, or gallops  GI: Soft, Nontender, Nondistended; Bowel sounds present, RUQ scant dark green drainage from fistula track  EXTREMITIES:  2+ Peripheral Pulses, No clubbing, cyanosis, or edema  LYMPH: No lymphadenopathy noted  SKIN: No rashes or lesions    Consultant(s) Notes Reviewed:  [x ] YES  [ ] NO    Discussed with Consultants/Other Providers [ x] YES     LABS                          8.4    6.30  )-----------( 526      ( 11 Jun 2021 06:04 )             28.7     06-11    140  |  105  |  23<H>  ----------------------------<  85  4.2   |  27  |  0.9    Ca    8.0<L>      11 Jun 2021 06:04  Phos  4.3     06-11  Mg     2.0     06-11    TPro  6.6  /  Alb  2.7<L>  /  TBili  0.5  /  DBili  x   /  AST  53<H>  /  ALT  52<H>  /  AlkPhos  103  06-10      POCT Blood Glucose.: 78 mg/dL (11 Jun 2021 07:37)      RADIOLOGY & ADDITIONAL TESTS:  - no images 6/11    HEALTH ISSUES - PROBLEM Dx:  Knee pain, left      MEDICATIONS  (STANDING):  acetaminophen   Tablet .. 1000 milliGRAM(s) Oral every 8 hours  aspirin  chewable 81 milliGRAM(s) Oral daily  atorvastatin 80 milliGRAM(s) Oral at bedtime  dextrose 40% Gel 15 Gram(s) Oral once  dextrose 5%. 1000 milliLiter(s) (50 mL/Hr) IV Continuous <Continuous>  dextrose 5%. 1000 milliLiter(s) (100 mL/Hr) IV Continuous <Continuous>  dextrose 50% Injectable 25 Gram(s) IV Push once  dextrose 50% Injectable 12.5 Gram(s) IV Push once  dextrose 50% Injectable 25 Gram(s) IV Push once  digoxin     Tablet 125 MICROGram(s) Oral daily  DULoxetine 90 milliGRAM(s) Oral daily  enoxaparin Injectable 40 milliGRAM(s) SubCutaneous daily  ferrous    sulfate 325 milliGRAM(s) Oral daily  gabapentin 600 milliGRAM(s) Oral three times a day  glucagon  Injectable 1 milliGRAM(s) IntraMuscular once  insulin glargine Injectable (LANTUS) 25 Unit(s) SubCutaneous every morning  insulin lispro (ADMELOG) corrective regimen sliding scale   SubCutaneous three times a day before meals  insulin lispro Injectable (ADMELOG) 3 Unit(s) SubCutaneous three times a day before meals  metoprolol succinate ER 25 milliGRAM(s) Oral daily  nafcillin  IVPB 2 Gram(s) IV Intermittent every 4 hours  nystatin Cream 1 Application(s) Topical two times a day  oxyCODONE  ER Tablet 20 milliGRAM(s) Oral every 12 hours  pantoprazole    Tablet 40 milliGRAM(s) Oral before breakfast  prasugrel 10 milliGRAM(s) Oral daily    MEDICATIONS  (PRN):  ALPRAZolam 0.5 milliGRAM(s) Oral every 8 hours PRN anxiety  baclofen 10 milliGRAM(s) Oral three times a day PRN Muscle Spasm  furosemide    Tablet 40 milliGRAM(s) Oral daily PRN fluid overload  ondansetron Injectable 4 milliGRAM(s) IV Push three times a day PRN Nausea and/or Vomiting  oxyCODONE    IR 10 milliGRAM(s) Oral every 4 hours PRN Severe Pain (7 - 10)

## 2021-06-12 LAB
GLUCOSE BLDC GLUCOMTR-MCNC: 124 MG/DL — HIGH (ref 70–99)
GLUCOSE BLDC GLUCOMTR-MCNC: 143 MG/DL — HIGH (ref 70–99)
GLUCOSE BLDC GLUCOMTR-MCNC: 160 MG/DL — HIGH (ref 70–99)
GLUCOSE BLDC GLUCOMTR-MCNC: 237 MG/DL — HIGH (ref 70–99)

## 2021-06-12 PROCEDURE — 99233 SBSQ HOSP IP/OBS HIGH 50: CPT

## 2021-06-12 RX ADMIN — OXYCODONE HYDROCHLORIDE 10 MILLIGRAM(S): 5 TABLET ORAL at 00:16

## 2021-06-12 RX ADMIN — PRASUGREL 10 MILLIGRAM(S): 5 TABLET, FILM COATED ORAL at 11:45

## 2021-06-12 RX ADMIN — INSULIN GLARGINE 25 UNIT(S): 100 INJECTION, SOLUTION SUBCUTANEOUS at 07:36

## 2021-06-12 RX ADMIN — NAFCILLIN 200 GRAM(S): 10 INJECTION, POWDER, FOR SOLUTION INTRAVENOUS at 13:17

## 2021-06-12 RX ADMIN — ENOXAPARIN SODIUM 40 MILLIGRAM(S): 100 INJECTION SUBCUTANEOUS at 11:45

## 2021-06-12 RX ADMIN — OXYCODONE HYDROCHLORIDE 20 MILLIGRAM(S): 5 TABLET ORAL at 05:48

## 2021-06-12 RX ADMIN — Medication 3 UNIT(S): at 16:55

## 2021-06-12 RX ADMIN — NAFCILLIN 200 GRAM(S): 10 INJECTION, POWDER, FOR SOLUTION INTRAVENOUS at 01:20

## 2021-06-12 RX ADMIN — Medication 325 MILLIGRAM(S): at 11:44

## 2021-06-12 RX ADMIN — GABAPENTIN 600 MILLIGRAM(S): 400 CAPSULE ORAL at 21:18

## 2021-06-12 RX ADMIN — Medication 1000 MILLIGRAM(S): at 05:48

## 2021-06-12 RX ADMIN — OXYCODONE HYDROCHLORIDE 10 MILLIGRAM(S): 5 TABLET ORAL at 20:37

## 2021-06-12 RX ADMIN — NYSTATIN CREAM 1 APPLICATION(S): 100000 CREAM TOPICAL at 05:48

## 2021-06-12 RX ADMIN — Medication 3 UNIT(S): at 07:37

## 2021-06-12 RX ADMIN — Medication 125 MICROGRAM(S): at 05:49

## 2021-06-12 RX ADMIN — NYSTATIN CREAM 1 APPLICATION(S): 100000 CREAM TOPICAL at 17:39

## 2021-06-12 RX ADMIN — Medication 1000 MILLIGRAM(S): at 13:47

## 2021-06-12 RX ADMIN — NAFCILLIN 200 GRAM(S): 10 INJECTION, POWDER, FOR SOLUTION INTRAVENOUS at 10:17

## 2021-06-12 RX ADMIN — GABAPENTIN 600 MILLIGRAM(S): 400 CAPSULE ORAL at 05:49

## 2021-06-12 RX ADMIN — Medication 25 MILLIGRAM(S): at 05:49

## 2021-06-12 RX ADMIN — PANTOPRAZOLE SODIUM 40 MILLIGRAM(S): 20 TABLET, DELAYED RELEASE ORAL at 06:54

## 2021-06-12 RX ADMIN — Medication 2: at 07:37

## 2021-06-12 RX ADMIN — DULOXETINE HYDROCHLORIDE 90 MILLIGRAM(S): 30 CAPSULE, DELAYED RELEASE ORAL at 11:44

## 2021-06-12 RX ADMIN — ATORVASTATIN CALCIUM 80 MILLIGRAM(S): 80 TABLET, FILM COATED ORAL at 21:18

## 2021-06-12 RX ADMIN — OXYCODONE HYDROCHLORIDE 20 MILLIGRAM(S): 5 TABLET ORAL at 06:54

## 2021-06-12 RX ADMIN — NAFCILLIN 200 GRAM(S): 10 INJECTION, POWDER, FOR SOLUTION INTRAVENOUS at 21:19

## 2021-06-12 RX ADMIN — NAFCILLIN 200 GRAM(S): 10 INJECTION, POWDER, FOR SOLUTION INTRAVENOUS at 17:38

## 2021-06-12 RX ADMIN — Medication 1000 MILLIGRAM(S): at 06:54

## 2021-06-12 RX ADMIN — OXYCODONE HYDROCHLORIDE 10 MILLIGRAM(S): 5 TABLET ORAL at 01:00

## 2021-06-12 RX ADMIN — OXYCODONE HYDROCHLORIDE 20 MILLIGRAM(S): 5 TABLET ORAL at 17:39

## 2021-06-12 RX ADMIN — Medication 81 MILLIGRAM(S): at 11:44

## 2021-06-12 RX ADMIN — Medication 1000 MILLIGRAM(S): at 13:15

## 2021-06-12 RX ADMIN — NAFCILLIN 200 GRAM(S): 10 INJECTION, POWDER, FOR SOLUTION INTRAVENOUS at 05:49

## 2021-06-12 RX ADMIN — Medication 3 UNIT(S): at 11:43

## 2021-06-12 RX ADMIN — Medication 1000 MILLIGRAM(S): at 21:18

## 2021-06-12 RX ADMIN — GABAPENTIN 600 MILLIGRAM(S): 400 CAPSULE ORAL at 13:14

## 2021-06-12 NOTE — PACU DISCHARGE NOTE - PAIN:
"Anesthesia Evaluation      Patient summary reviewed   No history of anesthetic complications     Airway   Mallampati: I  Neck ROM: full   Pulmonary - negative ROS and normal exam                          Cardiovascular - normal exam  Exercise tolerance: > or = 4 METS   Neuro/Psych - negative ROS     Endo/Other - negative ROS      GI/Hepatic/Renal    (+)   chronic renal disease,      Other findings: Recent fever and chills were likely due to his rectal issue and not a URI. Denies HTN, and \"back pain\" is also resolved, it was a strain.  Nephrolithiasis.        Dental - normal exam                        Anesthesia Plan  Planned anesthetic: general endotracheal    ASA 1   Induction: intravenous   Anesthetic plan and risks discussed with: patient    Post-op plan: routine recovery          "
Controlled with current regime
Controlled with current regime

## 2021-06-12 NOTE — PROGRESS NOTE ADULT - SUBJECTIVE AND OBJECTIVE BOX
FLOWER MARISCAL  65y  Male      Patient is a 65y old  Male who presents with a chief complaint of Septic arthritis (11 Jun 2021 15:13)      INTERVAL HPI/OVERNIGHT EVENTS: no acute events overnight. Patient notes, overall, patient is much more controlled but there are nursing delays for his PRN which he finds frustrating but tolerable. Otherwise, eager for discharge to start working on rehab.  No fevers, chills, n/v, chest pain, sob, abdominal pain.       REVIEW OF SYSTEMS:  CONSTITUTIONAL: No fever, weight loss, or fatigue  RESPIRATORY: No cough, wheezing, chills or hemoptysis; No shortness of breath  CARDIOVASCULAR: No chest pain, palpitations, dizziness, or leg swelling  GASTROINTESTINAL: No abdominal or epigastric pain. No nausea, vomiting, or hematemesis; No diarrhea or constipation. No melena or hematochezia.  GENITOURINARY: No dysuria, frequency, hematuria, or incontinence  NEUROLOGICAL: No headaches, memory loss, loss of strength, numbness, or tremors  SKIN: No itching, burning, rashes, or lesions   MUSCULOSKELETAL: No joint pain or swelling; No muscle, back, or extremity pain  PSYCHIATRIC: No depression, anxiety, mood swings, or difficulty sleeping  All other review of systems negative      VITALS  T(C): 36.1 (06-12-21 @ 05:40), Max: 36.6 (06-11-21 @ 21:32)  HR: 80 (06-12-21 @ 05:40) (73 - 92)  BP: 125/72 (06-12-21 @ 05:40) (99/54 - 133/77)  RR: 18 (06-11-21 @ 21:32) (18 - 18)  SpO2: 95% (06-11-21 @ 13:10) (95% - 95%)  Wt(kg): --Vital Signs Last 24 Hrs  T(C): 36.1 (12 Jun 2021 05:40), Max: 36.6 (11 Jun 2021 21:32)  T(F): 97 (12 Jun 2021 05:40), Max: 97.8 (11 Jun 2021 21:32)  HR: 80 (12 Jun 2021 05:40) (73 - 92)  BP: 125/72 (12 Jun 2021 05:40) (99/54 - 133/77)  BP(mean): --  RR: 18 (11 Jun 2021 21:32) (18 - 18)  SpO2: 95% (11 Jun 2021 13:10) (95% - 95%)      06-11-21 @ 07:01  -  06-12-21 @ 07:00  --------------------------------------------------------  IN: 820 mL / OUT: 1050 mL / NET: -230 mL    06-12-21 @ 07:01  -  06-12-21 @ 10:35  --------------------------------------------------------  IN: 100 mL / OUT: 300 mL / NET: -200 mL      PHYSICAL EXAM:  GENERAL: middle-age M-appears chronically ill appearing, NAD, non toxic, sitting up in bed watching tv, malnourished  EYES: anicteric sclera, non injected conjunctiva, EOMI  ENT: hearing grossly intact, oropharynx clear, MMM, fair dentition  PSYCH: no agitation, baseline mentation  NERVOUS SYSTEM:  Alert & Oriented X3, CN 2-12 grossly intact  PULMONARY: Clear to percussion bilaterally; No rales, rhonchi, wheezing, or rubs  CARDIOVASCULAR: Regular rate and rhythm; No murmurs, rubs, or gallops  GI: Soft, Nontender, Nondistended; Bowel sounds present, RUQ scant dark green drainage from fistula track  EXTREMITIES:  2+ Peripheral Pulses, No clubbing, cyanosis, or edema  LYMPH: No lymphadenopathy noted  SKIN: No rashes or lesions      Consultant(s) Notes Reviewed:  [x ] YES  [ ] NO    Discussed with Consultants/Other Providers [ x] YES     LABS                          8.4    6.30  )-----------( 526      ( 11 Jun 2021 06:04 )             28.7     06-11    140  |  105  |  23<H>  ----------------------------<  85  4.2   |  27  |  0.9    Ca    8.0<L>      11 Jun 2021 06:04  Phos  4.3     06-11  Mg     2.0     06-11    POCT Blood Glucose.: 160 mg/dL (12 Jun 2021 07:25)    RADIOLOGY & ADDITIONAL TESTS:  - no new images 6/12    HEALTH ISSUES - PROBLEM Dx:  Knee pain, left    MEDICATIONS  (STANDING):  acetaminophen   Tablet .. 1000 milliGRAM(s) Oral every 8 hours  aspirin  chewable 81 milliGRAM(s) Oral daily  atorvastatin 80 milliGRAM(s) Oral at bedtime  dextrose 40% Gel 15 Gram(s) Oral once  dextrose 5%. 1000 milliLiter(s) (50 mL/Hr) IV Continuous <Continuous>  dextrose 5%. 1000 milliLiter(s) (100 mL/Hr) IV Continuous <Continuous>  dextrose 50% Injectable 25 Gram(s) IV Push once  dextrose 50% Injectable 12.5 Gram(s) IV Push once  dextrose 50% Injectable 25 Gram(s) IV Push once  digoxin     Tablet 125 MICROGram(s) Oral daily  DULoxetine 90 milliGRAM(s) Oral daily  enoxaparin Injectable 40 milliGRAM(s) SubCutaneous daily  ferrous    sulfate 325 milliGRAM(s) Oral daily  gabapentin 600 milliGRAM(s) Oral three times a day  glucagon  Injectable 1 milliGRAM(s) IntraMuscular once  insulin glargine Injectable (LANTUS) 25 Unit(s) SubCutaneous every morning  insulin lispro (ADMELOG) corrective regimen sliding scale   SubCutaneous three times a day before meals  insulin lispro Injectable (ADMELOG) 3 Unit(s) SubCutaneous three times a day before meals  metoprolol succinate ER 25 milliGRAM(s) Oral daily  nafcillin  IVPB 2 Gram(s) IV Intermittent every 4 hours  nystatin Cream 1 Application(s) Topical two times a day  oxyCODONE  ER Tablet 20 milliGRAM(s) Oral every 12 hours  pantoprazole    Tablet 40 milliGRAM(s) Oral before breakfast  prasugrel 10 milliGRAM(s) Oral daily    MEDICATIONS  (PRN):  ALPRAZolam 0.5 milliGRAM(s) Oral every 8 hours PRN anxiety  baclofen 10 milliGRAM(s) Oral three times a day PRN Muscle Spasm  furosemide    Tablet 40 milliGRAM(s) Oral daily PRN fluid overload  ondansetron Injectable 4 milliGRAM(s) IV Push three times a day PRN Nausea and/or Vomiting  oxyCODONE    IR 10 milliGRAM(s) Oral every 4 hours PRN Severe Pain (7 - 10)

## 2021-06-12 NOTE — PROGRESS NOTE ADULT - ASSESSMENT
64 yo male admitted with left septic knee and severe PVD    # Left Knee Septic Arthritis with MSSA bacteremia- resolving  - repeat Blood Cx negative   - S/P left knee wash out by ortho  - Ortho following; recalled yesterday for L knee pain; knee xray as above, grossly unchanged from prior MRI. Will see pt again today          Left knee lateral drain removed (6/1), Medial knee drain and occlusive dressing removed on 6/3  - ID on board     ESR/CRP are improving      continue nafcillin 2g q 4 hours     will need 6 weeks from time of last debridement (5/27-7/7) -- to finish with cefazolin 2g q 8 hours     Weekly CBC, CMP, ESR/CRP     ID follow-up with Dr. Woody Cruz for Telehealth. We will call the patient between 10:30-1:30      [address]       [phone]       Fax [phone]  - PICC line placed on 6/4  - pain management recalled for left knee pain       Continue with oxycodone ER 20mg q12h        Continue with oxycodone IR 10mg q4h PRN for severe pain; patient has not been getting this dosing schedule and has requested IV morphine       D/C IV morphine       Continue with other non-opioid pharmacotherapy to reduce opioid requirements.       #Biliary Drainage from previous perc sudhir site- resolved   - Previous Intraabdominal Cx 9/4/2020 -- VRE faecium and pseudomonas aeruginosa  - Wound Cx 5/1 growing Pseudomonas/VRE Faecium  - CT Abdomen and Pelvis w/ IV Cont (04.26.21 @ 04:42): No evidence of acute intra-abdominal pathology. Decreased area of right upper quadrant subcutaneous stranding at site of prior percutaneous cholecystostomy, without evidence of abscess  - HIDA scan unable to visualize contrast in GB   - spoke w/ Dr. Tello 5/3 after HIDA who recommended outpatient ERCP and cholecystectomy to divert bile and heal fistula  - At this time RUQ tract appears to be closed and well healed w/o active drainage   - LFT's unremarkable   - spoke w/ Dr. Tello again on 6/3 and 6/9 - no plan for acute intervention, OP follow up in 1-2 weeks. Upon discharge please contact [facility] to schedule follow up for ERCP w/stent placement. Will need to be off AC and DAPT 5 days prior to procedure.    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  - No further intervention per Ortho     # PVD  - Arterial doppler 5/29 of bilateral LE show normal arterial flow in the bilateral lower extremities. Limited exam due to bandages  - Vascular Cardiology team following, no further intervention   c/w ASA and Effient and high intensity statin   c/w GDMT for HFrEF     # DM2 c/b hyperglycemia- well controlled  - Lantus 25u in the morning  - cont lispro 3 tid w/ meals      #Microcytic Anemia   - Iron def + ACD?, no evidence of GI bleed  - will start iron supplementation  - o/p GI f/u    #CAD s/p PCI to RCA and OM1, s/p LIMA to LAD (2018)   #Severe ischemic cardiomyopathy w/ reduced EF s/p CRT-D   #HTN   #DLD   - JOHN PAUL 5/2021 w/ ef 20-25%  - on ASA and Effient   - restarted on high intensity statin  - c/w GDMT (Toprol, Aldactone, Digoxn, Lasix prn ) for HFrEF     # Covid vaccine status:   He has received one dose and is supposed to receive the second dose on 5/15/21 but he was already hospitalized. Can receive 2nd dose anytime after leaving hospital.    # DVT ppx: Lovenox  # GI ppx: PPI from home  # Diet: DASH  # Code Status: Full Code      #Progress Note Handoff  Pending (specify): insurance auth  Family discussion: had an extensive conversation today eith patient regarding medical updates and plan of care.  Disposition: [place]   66 yo male admitted with left septic knee and severe PVD    # Left Knee Septic Arthritis with MSSA bacteremia- resolving  - repeat Blood Cx negative   - S/P left knee wash out by ortho  - Ortho following; recalled yesterday for L knee pain; knee xray as above, grossly unchanged from prior MRI. Will see pt again today          Left knee lateral drain removed (6/1), Medial knee drain and occlusive dressing removed on 6/3  - ID on board     ESR/CRP are improving      continue nafcillin 2g q 4 hours     will need 6 weeks from time of last debridement (5/27-7/7) -- to finish with cefazolin 2g q 8 hours     Weekly CBC, CMP, ESR/CRP     ID follow-up with Dr. Woody Cruz for Telehealth. We will call the patient between 10:30-1:30      [address]       [phone]       Fax [phone]  - PICC line placed on 6/4  - pain management recalled for left knee pain       Continue with oxycodone ER 20mg q12h        Continue with oxycodone IR 10mg q4h PRN for severe pain; patient has not been getting this dosing schedule and has requested IV morphine       D/C IV morphine       Continue with other non-opioid pharmacotherapy to reduce opioid requirements.       #Biliary Drainage from previous perc sudhir site- resolved   - Previous Intraabdominal Cx 9/4/2020 -- VRE faecium and pseudomonas aeruginosa  - Wound Cx 5/1 growing Pseudomonas/VRE Faecium  - CT Abdomen and Pelvis w/ IV Cont (04.26.21 @ 04:42): No evidence of acute intra-abdominal pathology. Decreased area of right upper quadrant subcutaneous stranding at site of prior percutaneous cholecystostomy, without evidence of abscess  - HIDA scan unable to visualize contrast in GB   - spoke w/ Dr. Tello 5/3 after HIDA who recommended outpatient ERCP and cholecystectomy to divert bile and heal fistula  - At this time RUQ tract appears to be closed and well healed w/o active drainage   - LFT's unremarkable   - spoke w/ Dr. Tello again on 6/3 and 6/9 - no plan for acute intervention, OP follow up in 1-2 weeks. Upon discharge please contact [facility] to schedule follow up for ERCP w/stent placement. Will need to be off AC and DAPT 5 days prior to procedure.    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  - No further intervention per Ortho     # PVD  - Arterial doppler 5/29 of bilateral LE show normal arterial flow in the bilateral lower extremities. Limited exam due to bandages  - Vascular Cardiology team following, no further intervention   c/w ASA and Effient and high intensity statin   c/w GDMT for HFrEF     # DM2 c/b hyperglycemia- well controlled  - Lantus 25u in the morning  - cont lispro 3 tid w/ meals      #Microcytic Anemia   - Iron def + ACD?, no evidence of GI bleed  - will start iron supplementation  - o/p GI f/u    #CAD s/p PCI to RCA and OM1, s/p LIMA to LAD (2018)   #Severe ischemic cardiomyopathy w/ reduced EF s/p CRT-D   #HTN   #DLD   - JOHN PAUL 5/2021 w/ ef 20-25%  - on ASA and Effient   - restarted on high intensity statin  - c/w GDMT (Toprol, Aldactone, Digoxn, Lasix prn ) for HFrEF     # Covid vaccine status:   He has received one dose and is supposed to receive the second dose on 5/15/21 but he was already hospitalized. Can receive 2nd dose anytime after leaving hospital.    # DVT ppx: Lovenox  # GI ppx: PPI from home  # Diet: DASH  # Code Status: Full Code      #Progress Note Handoff  Pending (specify): insurance auth. Will need 2nd covid dose upon discharge  Family discussion: had an extensive conversation today eith patient regarding medical updates and plan of care.  Disposition: [place]

## 2021-06-13 LAB
GLUCOSE BLDC GLUCOMTR-MCNC: 117 MG/DL — HIGH (ref 70–99)
GLUCOSE BLDC GLUCOMTR-MCNC: 132 MG/DL — HIGH (ref 70–99)
GLUCOSE BLDC GLUCOMTR-MCNC: 152 MG/DL — HIGH (ref 70–99)
GLUCOSE BLDC GLUCOMTR-MCNC: 79 MG/DL — SIGNIFICANT CHANGE UP (ref 70–99)
GLUCOSE BLDC GLUCOMTR-MCNC: 80 MG/DL — SIGNIFICANT CHANGE UP (ref 70–99)

## 2021-06-13 PROCEDURE — 99233 SBSQ HOSP IP/OBS HIGH 50: CPT

## 2021-06-13 RX ORDER — OXYCODONE HYDROCHLORIDE 5 MG/1
10 TABLET ORAL ONCE
Refills: 0 | Status: DISCONTINUED | OUTPATIENT
Start: 2021-06-13 | End: 2021-06-13

## 2021-06-13 RX ADMIN — DULOXETINE HYDROCHLORIDE 90 MILLIGRAM(S): 30 CAPSULE, DELAYED RELEASE ORAL at 11:43

## 2021-06-13 RX ADMIN — GABAPENTIN 600 MILLIGRAM(S): 400 CAPSULE ORAL at 05:21

## 2021-06-13 RX ADMIN — NYSTATIN CREAM 1 APPLICATION(S): 100000 CREAM TOPICAL at 17:50

## 2021-06-13 RX ADMIN — ENOXAPARIN SODIUM 40 MILLIGRAM(S): 100 INJECTION SUBCUTANEOUS at 11:43

## 2021-06-13 RX ADMIN — OXYCODONE HYDROCHLORIDE 20 MILLIGRAM(S): 5 TABLET ORAL at 18:21

## 2021-06-13 RX ADMIN — Medication 3 UNIT(S): at 07:39

## 2021-06-13 RX ADMIN — Medication 325 MILLIGRAM(S): at 11:41

## 2021-06-13 RX ADMIN — NAFCILLIN 200 GRAM(S): 10 INJECTION, POWDER, FOR SOLUTION INTRAVENOUS at 11:33

## 2021-06-13 RX ADMIN — Medication 1000 MILLIGRAM(S): at 05:20

## 2021-06-13 RX ADMIN — NAFCILLIN 200 GRAM(S): 10 INJECTION, POWDER, FOR SOLUTION INTRAVENOUS at 05:23

## 2021-06-13 RX ADMIN — NYSTATIN CREAM 1 APPLICATION(S): 100000 CREAM TOPICAL at 05:24

## 2021-06-13 RX ADMIN — PRASUGREL 10 MILLIGRAM(S): 5 TABLET, FILM COATED ORAL at 11:42

## 2021-06-13 RX ADMIN — NAFCILLIN 200 GRAM(S): 10 INJECTION, POWDER, FOR SOLUTION INTRAVENOUS at 14:27

## 2021-06-13 RX ADMIN — Medication 25 MILLIGRAM(S): at 05:20

## 2021-06-13 RX ADMIN — PANTOPRAZOLE SODIUM 40 MILLIGRAM(S): 20 TABLET, DELAYED RELEASE ORAL at 06:39

## 2021-06-13 RX ADMIN — OXYCODONE HYDROCHLORIDE 20 MILLIGRAM(S): 5 TABLET ORAL at 18:47

## 2021-06-13 RX ADMIN — Medication 1000 MILLIGRAM(S): at 22:00

## 2021-06-13 RX ADMIN — Medication 3 UNIT(S): at 11:40

## 2021-06-13 RX ADMIN — Medication 1000 MILLIGRAM(S): at 13:45

## 2021-06-13 RX ADMIN — OXYCODONE HYDROCHLORIDE 20 MILLIGRAM(S): 5 TABLET ORAL at 05:20

## 2021-06-13 RX ADMIN — INSULIN GLARGINE 25 UNIT(S): 100 INJECTION, SOLUTION SUBCUTANEOUS at 07:40

## 2021-06-13 RX ADMIN — Medication 1000 MILLIGRAM(S): at 13:15

## 2021-06-13 RX ADMIN — NAFCILLIN 200 GRAM(S): 10 INJECTION, POWDER, FOR SOLUTION INTRAVENOUS at 22:01

## 2021-06-13 RX ADMIN — Medication 2: at 07:39

## 2021-06-13 RX ADMIN — NAFCILLIN 200 GRAM(S): 10 INJECTION, POWDER, FOR SOLUTION INTRAVENOUS at 17:49

## 2021-06-13 RX ADMIN — OXYCODONE HYDROCHLORIDE 10 MILLIGRAM(S): 5 TABLET ORAL at 23:41

## 2021-06-13 RX ADMIN — OXYCODONE HYDROCHLORIDE 10 MILLIGRAM(S): 5 TABLET ORAL at 05:20

## 2021-06-13 RX ADMIN — GABAPENTIN 600 MILLIGRAM(S): 400 CAPSULE ORAL at 13:15

## 2021-06-13 RX ADMIN — NAFCILLIN 200 GRAM(S): 10 INJECTION, POWDER, FOR SOLUTION INTRAVENOUS at 02:52

## 2021-06-13 RX ADMIN — Medication 81 MILLIGRAM(S): at 11:41

## 2021-06-13 RX ADMIN — Medication 125 MICROGRAM(S): at 05:20

## 2021-06-13 RX ADMIN — ATORVASTATIN CALCIUM 80 MILLIGRAM(S): 80 TABLET, FILM COATED ORAL at 22:00

## 2021-06-13 RX ADMIN — GABAPENTIN 600 MILLIGRAM(S): 400 CAPSULE ORAL at 22:00

## 2021-06-13 NOTE — PROGRESS NOTE ADULT - SUBJECTIVE AND OBJECTIVE BOX
Pt. seen/ examined  Noted improvement in pain - able to straight leg raise LLE  Wounds sealed, no drainage, no erythema

## 2021-06-13 NOTE — PROGRESS NOTE ADULT - NSTELEHEALTH_GEN_ALL_CORE
No

## 2021-06-13 NOTE — PROGRESS NOTE ADULT - ASSESSMENT
64 yo male admitted with left septic knee and severe PVD    # Left Knee Septic Arthritis with MSSA bacteremia- resolving  - repeat Blood Cx negative   - S/P left knee wash out by ortho     continue nafcillin 2g q 4 hours     will need 6 weeks from time of last debridement (5/27-7/7) -- to finish with cefazolin 2g q 8 hours     Weekly CBC, CMP, ESR/CRP     ID follow-up with Dr. Woody Cruz for Telehealth. We will call the patient between 10:30-1:30      [address]       [phone]       Fax [phone]  - PICC line placed on 6/4      #Pain management of left knee       Continue with oxycodone ER 20mg q12h  , today added one more dose of oxycontin 10 mg , if pain remains elevated will consider increasing oxycontin to 30 mg q12        Continue with oxycodone IR 10mg q4h PRN for severe pain; patient has not been getting this dosing schedule and has requested IV morphine         #Biliary Drainage from previous perc sudhir site- resolved    Dr. Tello OP follow up in 1-2 weeks. Upon discharge please contact [facility] to schedule follow up for ERCP w/stent placement. Will need to be off AC and DAPT 5 days prior to procedure.    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; Completed 6 week course of vancomycin/rifampin with antibiotic spacer placed in 9/18/2020    # PVD  cw aspirin and effient     # DM2  CAPILLARY BLOOD GLUCOSE  POCT Blood Glucose.: 152 mg/dL (13 Jun 2021 07:14)  POCT Blood Glucose.: 237 mg/dL (12 Jun 2021 21:03)  POCT Blood Glucose.: 124 mg/dL (12 Jun 2021 16:34)  POCT Blood Glucose.: 143 mg/dL (12 Jun 2021 11:10)  controlled     #Microcytic Anemia   - Iron def + ACD?, no evidence of GI bleed  - will start iron supplementation  - o/p GI f/u    #CAD s/p PCI to RCA and OM1, s/p LIMA to LAD (2018)     #Severe ischemic cardiomyopathy w/ reduced EF s/p CRT-D     #HTN   BP: 122/61 (13 Jun 2021 06:48) (122/61 - 124/73)  controlled    #DLD   cw statin     # Covid vaccine status:   He has received one dose and is supposed to receive the second dose on 5/15/21 but he was already hospitalized. Can receive 2nd dose anytime after leaving hospital.    # Mild mitral valve regurgitation.     #. Mild tricuspid regurgitation.    Progress Note Handoff    Pending:  authorization for placement    Family discussion: patient verbalized understanding and agreeable to plan of care     Disposition:[facility]

## 2021-06-13 NOTE — PROGRESS NOTE ADULT - SUBJECTIVE AND OBJECTIVE BOX
FLOWER MARISCAL  65y  [facility] [address]      Patient is a 65y old  Male who presents with a chief complaint of Septic arthritis (12 Jun 2021 10:34)      INTERVAL HPI/OVERNIGHT EVENTS:  complaining of mild discomfort       REVIEW OF SYSTEMS:  CONSTITUTIONAL: No fever, weight loss, or fatigue  EYES: No eye pain, visual disturbances, or discharge  ENMT:  No difficulty hearing, tinnitus, vertigo; No sinus or throat pain  NECK: No pain or stiffness  BREASTS: No pain, masses, or nipple discharge  RESPIRATORY: No cough, wheezing, chills or hemoptysis; No shortness of breath  CARDIOVASCULAR: No chest pain, palpitations, dizziness, or leg swelling  GASTROINTESTINAL: No abdominal or epigastric pain. No nausea, vomiting, or hematemesis; No diarrhea or constipation. No melena or hematochezia.  GENITOURINARY: No dysuria, frequency, hematuria, or incontinence  NEUROLOGICAL: No headaches, memory loss, loss of strength, numbness, or tremors  SKIN: No itching, burning, rashes, or lesions   LYMPH NODES: No enlarged glands  ENDOCRINE: No heat or cold intolerance; No hair loss  MUSCULOSKELETAL: mild discomfort on left knee  PSYCHIATRIC: No depression, anxiety, mood swings, or difficulty sleeping  HEME/LYMPH: No easy bruising, or bleeding gums  ALLERY AND IMMUNOLOGIC: No hives or eczema  FAMILY HISTORY:  Family history of heart disease (Mother)    Family history of allergies  daughter      T(C): 36.2 (06-13-21 @ 08:44), Max: 36.6 (06-12-21 @ 20:54)  HR: 73 (06-13-21 @ 06:48) (73 - 86)  BP: 122/61 (06-13-21 @ 06:48) (122/61 - 124/73)  RR: 20 (06-13-21 @ 06:48) (18 - 20)  SpO2: --  Wt(kg): --Vital Signs Last 24 Hrs  T(C): 36.2 (13 Jun 2021 08:44), Max: 36.6 (12 Jun 2021 20:54)  T(F): 97.1 (13 Jun 2021 08:44), Max: 97.8 (12 Jun 2021 20:54)  HR: 73 (13 Jun 2021 06:48) (73 - 86)  BP: 122/61 (13 Jun 2021 06:48) (122/61 - 124/73)  BP(mean): 80 (12 Jun 2021 13:27) (80 - 80)  RR: 20 (13 Jun 2021 06:48) (18 - 20)  SpO2: --    PHYSICAL EXAM:  GENERAL: NAD, well-groomed, well-developed  HEAD:  Atraumatic, Normocephalic  EYES: EOMI, PERRLA, conjunctiva and sclera clear  ENMT: No tonsillar erythema, exudates, or enlargement; Moist mucous membranes, Good dentition, No lesions  NECK: Supple, No JVD, Normal thyroid  NERVOUS SYSTEM:  Alert & Oriented X3,   PULM: Clear to auscultation bilaterally  CARDIAC: Regular rate and rhythm; No murmurs, rubs, or gallops  GI: Soft, Nontender, Nondistended; Bowel sounds present  EXTREMITIES:  2+ Peripheral Pulses, No clubbing, cyanosis, or edema, left knee no erythema appreciate  LYMPH: No lymphadenopathy noted  SKIN: No rashes or lesions    Consultant(s) Notes Reviewed:  [x ] YES  [ ] NO  Care Discussed with Consultants/Other Providers [ x] YES  [ ] NO    LABS:                    acetaminophen   Tablet .. 1000 milliGRAM(s) Oral every 8 hours  ALPRAZolam 0.5 milliGRAM(s) Oral every 8 hours PRN  aspirin  chewable 81 milliGRAM(s) Oral daily  atorvastatin 80 milliGRAM(s) Oral at bedtime  baclofen 10 milliGRAM(s) Oral three times a day PRN  dextrose 40% Gel 15 Gram(s) Oral once  dextrose 5%. 1000 milliLiter(s) IV Continuous <Continuous>  dextrose 5%. 1000 milliLiter(s) IV Continuous <Continuous>  dextrose 50% Injectable 25 Gram(s) IV Push once  dextrose 50% Injectable 12.5 Gram(s) IV Push once  dextrose 50% Injectable 25 Gram(s) IV Push once  digoxin     Tablet 125 MICROGram(s) Oral daily  DULoxetine 90 milliGRAM(s) Oral daily  enoxaparin Injectable 40 milliGRAM(s) SubCutaneous daily  ferrous    sulfate 325 milliGRAM(s) Oral daily  furosemide    Tablet 40 milliGRAM(s) Oral daily PRN  gabapentin 600 milliGRAM(s) Oral three times a day  glucagon  Injectable 1 milliGRAM(s) IntraMuscular once  insulin glargine Injectable (LANTUS) 25 Unit(s) SubCutaneous every morning  insulin lispro (ADMELOG) corrective regimen sliding scale   SubCutaneous three times a day before meals  insulin lispro Injectable (ADMELOG) 3 Unit(s) SubCutaneous three times a day before meals  metoprolol succinate ER 25 milliGRAM(s) Oral daily  nafcillin  IVPB 2 Gram(s) IV Intermittent every 4 hours  nystatin Cream 1 Application(s) Topical two times a day  ondansetron Injectable 4 milliGRAM(s) IV Push three times a day PRN  oxyCODONE    IR 10 milliGRAM(s) Oral every 4 hours PRN  oxyCODONE  ER Tablet 10 milliGRAM(s) Oral once  oxyCODONE  ER Tablet 20 milliGRAM(s) Oral every 12 hours  pantoprazole    Tablet 40 milliGRAM(s) Oral before breakfast  prasugrel 10 milliGRAM(s) Oral daily      HEALTH ISSUES - PROBLEM Dx:  Knee pain, left  Knee pain, left            Case Discussed with House Staff   Spectra [phone]

## 2021-06-13 NOTE — PROGRESS NOTE ADULT - ASSESSMENT
No new labs - will order ESR /CRP prior to D/C  Planned D/C to rehab tomorrow  Discussed outpatient follow up

## 2021-06-14 LAB
ANION GAP SERPL CALC-SCNC: 6 MMOL/L — LOW (ref 7–14)
BUN SERPL-MCNC: 21 MG/DL — HIGH (ref 10–20)
CALCIUM SERPL-MCNC: 8.4 MG/DL — LOW (ref 8.5–10.1)
CHLORIDE SERPL-SCNC: 103 MMOL/L — SIGNIFICANT CHANGE UP (ref 98–110)
CO2 SERPL-SCNC: 28 MMOL/L — SIGNIFICANT CHANGE UP (ref 17–32)
CREAT SERPL-MCNC: 0.8 MG/DL — SIGNIFICANT CHANGE UP (ref 0.7–1.5)
ERYTHROCYTE [SEDIMENTATION RATE] IN BLOOD: 54 MM/HR — HIGH (ref 0–10)
GLUCOSE BLDC GLUCOMTR-MCNC: 107 MG/DL — HIGH (ref 70–99)
GLUCOSE BLDC GLUCOMTR-MCNC: 110 MG/DL — HIGH (ref 70–99)
GLUCOSE BLDC GLUCOMTR-MCNC: 118 MG/DL — HIGH (ref 70–99)
GLUCOSE BLDC GLUCOMTR-MCNC: 61 MG/DL — LOW (ref 70–99)
GLUCOSE BLDC GLUCOMTR-MCNC: 66 MG/DL — LOW (ref 70–99)
GLUCOSE BLDC GLUCOMTR-MCNC: 72 MG/DL — SIGNIFICANT CHANGE UP (ref 70–99)
GLUCOSE BLDC GLUCOMTR-MCNC: 76 MG/DL — SIGNIFICANT CHANGE UP (ref 70–99)
GLUCOSE BLDC GLUCOMTR-MCNC: 89 MG/DL — SIGNIFICANT CHANGE UP (ref 70–99)
GLUCOSE SERPL-MCNC: 84 MG/DL — SIGNIFICANT CHANGE UP (ref 70–99)
HCT VFR BLD CALC: 29.9 % — LOW (ref 42–52)
HGB BLD-MCNC: 8.7 G/DL — LOW (ref 14–18)
MAGNESIUM SERPL-MCNC: 2 MG/DL — SIGNIFICANT CHANGE UP (ref 1.8–2.4)
MCHC RBC-ENTMCNC: 22.1 PG — LOW (ref 27–31)
MCHC RBC-ENTMCNC: 29.1 G/DL — LOW (ref 32–37)
MCV RBC AUTO: 76.1 FL — LOW (ref 80–94)
NRBC # BLD: 0 /100 WBCS — SIGNIFICANT CHANGE UP (ref 0–0)
PHOSPHATE SERPL-MCNC: 4 MG/DL — SIGNIFICANT CHANGE UP (ref 2.1–4.9)
PLATELET # BLD AUTO: 578 K/UL — HIGH (ref 130–400)
POTASSIUM SERPL-MCNC: 4.8 MMOL/L — SIGNIFICANT CHANGE UP (ref 3.5–5)
POTASSIUM SERPL-SCNC: 4.8 MMOL/L — SIGNIFICANT CHANGE UP (ref 3.5–5)
RBC # BLD: 3.93 M/UL — LOW (ref 4.7–6.1)
RBC # FLD: 25.4 % — HIGH (ref 11.5–14.5)
SARS-COV-2 RNA SPEC QL NAA+PROBE: SIGNIFICANT CHANGE UP
SARS-COV-2 RNA SPEC QL NAA+PROBE: SIGNIFICANT CHANGE UP
SODIUM SERPL-SCNC: 137 MMOL/L — SIGNIFICANT CHANGE UP (ref 135–146)
WBC # BLD: 6.79 K/UL — SIGNIFICANT CHANGE UP (ref 4.8–10.8)
WBC # FLD AUTO: 6.79 K/UL — SIGNIFICANT CHANGE UP (ref 4.8–10.8)

## 2021-06-14 PROCEDURE — 99232 SBSQ HOSP IP/OBS MODERATE 35: CPT

## 2021-06-14 RX ADMIN — NAFCILLIN 200 GRAM(S): 10 INJECTION, POWDER, FOR SOLUTION INTRAVENOUS at 03:06

## 2021-06-14 RX ADMIN — NAFCILLIN 200 GRAM(S): 10 INJECTION, POWDER, FOR SOLUTION INTRAVENOUS at 05:08

## 2021-06-14 RX ADMIN — OXYCODONE HYDROCHLORIDE 20 MILLIGRAM(S): 5 TABLET ORAL at 17:01

## 2021-06-14 RX ADMIN — NAFCILLIN 200 GRAM(S): 10 INJECTION, POWDER, FOR SOLUTION INTRAVENOUS at 10:25

## 2021-06-14 RX ADMIN — Medication 3 UNIT(S): at 08:33

## 2021-06-14 RX ADMIN — Medication 81 MILLIGRAM(S): at 12:03

## 2021-06-14 RX ADMIN — Medication 1000 MILLIGRAM(S): at 22:00

## 2021-06-14 RX ADMIN — Medication 125 MICROGRAM(S): at 05:08

## 2021-06-14 RX ADMIN — OXYCODONE HYDROCHLORIDE 10 MILLIGRAM(S): 5 TABLET ORAL at 23:06

## 2021-06-14 RX ADMIN — NAFCILLIN 200 GRAM(S): 10 INJECTION, POWDER, FOR SOLUTION INTRAVENOUS at 21:31

## 2021-06-14 RX ADMIN — Medication 1000 MILLIGRAM(S): at 13:30

## 2021-06-14 RX ADMIN — GABAPENTIN 600 MILLIGRAM(S): 400 CAPSULE ORAL at 13:30

## 2021-06-14 RX ADMIN — NYSTATIN CREAM 1 APPLICATION(S): 100000 CREAM TOPICAL at 05:08

## 2021-06-14 RX ADMIN — OXYCODONE HYDROCHLORIDE 20 MILLIGRAM(S): 5 TABLET ORAL at 05:07

## 2021-06-14 RX ADMIN — OXYCODONE HYDROCHLORIDE 20 MILLIGRAM(S): 5 TABLET ORAL at 18:24

## 2021-06-14 RX ADMIN — NAFCILLIN 200 GRAM(S): 10 INJECTION, POWDER, FOR SOLUTION INTRAVENOUS at 17:02

## 2021-06-14 RX ADMIN — DULOXETINE HYDROCHLORIDE 90 MILLIGRAM(S): 30 CAPSULE, DELAYED RELEASE ORAL at 12:03

## 2021-06-14 RX ADMIN — Medication 1000 MILLIGRAM(S): at 05:08

## 2021-06-14 RX ADMIN — OXYCODONE HYDROCHLORIDE 10 MILLIGRAM(S): 5 TABLET ORAL at 05:07

## 2021-06-14 RX ADMIN — OXYCODONE HYDROCHLORIDE 10 MILLIGRAM(S): 5 TABLET ORAL at 23:40

## 2021-06-14 RX ADMIN — PRASUGREL 10 MILLIGRAM(S): 5 TABLET, FILM COATED ORAL at 12:04

## 2021-06-14 RX ADMIN — OXYCODONE HYDROCHLORIDE 10 MILLIGRAM(S): 5 TABLET ORAL at 17:01

## 2021-06-14 RX ADMIN — Medication 3 UNIT(S): at 12:02

## 2021-06-14 RX ADMIN — Medication 325 MILLIGRAM(S): at 12:03

## 2021-06-14 RX ADMIN — OXYCODONE HYDROCHLORIDE 10 MILLIGRAM(S): 5 TABLET ORAL at 10:24

## 2021-06-14 RX ADMIN — Medication 25 MILLIGRAM(S): at 05:08

## 2021-06-14 RX ADMIN — ATORVASTATIN CALCIUM 80 MILLIGRAM(S): 80 TABLET, FILM COATED ORAL at 21:31

## 2021-06-14 RX ADMIN — Medication 1000 MILLIGRAM(S): at 21:32

## 2021-06-14 RX ADMIN — PANTOPRAZOLE SODIUM 40 MILLIGRAM(S): 20 TABLET, DELAYED RELEASE ORAL at 05:09

## 2021-06-14 RX ADMIN — GABAPENTIN 600 MILLIGRAM(S): 400 CAPSULE ORAL at 21:31

## 2021-06-14 RX ADMIN — Medication 1000 MILLIGRAM(S): at 14:02

## 2021-06-14 RX ADMIN — INSULIN GLARGINE 25 UNIT(S): 100 INJECTION, SOLUTION SUBCUTANEOUS at 08:33

## 2021-06-14 RX ADMIN — GABAPENTIN 600 MILLIGRAM(S): 400 CAPSULE ORAL at 05:07

## 2021-06-14 RX ADMIN — NAFCILLIN 200 GRAM(S): 10 INJECTION, POWDER, FOR SOLUTION INTRAVENOUS at 13:30

## 2021-06-14 RX ADMIN — OXYCODONE HYDROCHLORIDE 10 MILLIGRAM(S): 5 TABLET ORAL at 10:56

## 2021-06-14 RX ADMIN — NYSTATIN CREAM 1 APPLICATION(S): 100000 CREAM TOPICAL at 18:24

## 2021-06-14 RX ADMIN — ENOXAPARIN SODIUM 40 MILLIGRAM(S): 100 INJECTION SUBCUTANEOUS at 12:03

## 2021-06-14 NOTE — PROGRESS NOTE ADULT - SUBJECTIVE AND OBJECTIVE BOX
FLOWER MARISCAL  65y  Male      Patient is a 65y old  Male who presents with a chief complaint of Septic arthritis (13 Jun 2021 16:39)       INTERVAL HPI/OVERNIGHT EVENTS: no acute events overnight. patient conversant and pleasant. updated on pending authorization. responds with hope that he will be discharge today as he is eager to get started on his rehab.      REVIEW OF SYSTEMS:  CONSTITUTIONAL: No fever, weight loss, or fatigue  RESPIRATORY: No cough, wheezing, chills or hemoptysis; No shortness of breath  CARDIOVASCULAR: No chest pain, palpitations, dizziness, or leg swelling  GASTROINTESTINAL: No abdominal or epigastric pain. No nausea, vomiting, or hematemesis; No diarrhea or constipation. No melena or hematochezia.  GENITOURINARY: No dysuria, frequency, hematuria, or incontinence  NEUROLOGICAL: No headaches, memory loss, loss of strength, numbness, or tremors  SKIN: No itching, burning, rashes, or lesions   MUSCULOSKELETAL: No joint pain or swelling; No muscle, back, or extremity pain  PSYCHIATRIC: No depression, anxiety, mood swings, or difficulty sleeping  All other review of systems negative    VITALS  T(C): 36.1 (06-14-21 @ 13:10), Max: 36.4 (06-14-21 @ 04:42)  HR: 72 (06-14-21 @ 13:10) (72 - 83)  BP: 117/65 (06-14-21 @ 13:10) (117/65 - 137/76)  RR: 20 (06-14-21 @ 13:10) (18 - 20)  SpO2: --  Wt(kg): --Vital Signs Last 24 Hrs  T(C): 36.1 (14 Jun 2021 13:10), Max: 36.4 (14 Jun 2021 04:42)  T(F): 97 (14 Jun 2021 13:10), Max: 97.6 (14 Jun 2021 04:42)  HR: 72 (14 Jun 2021 13:10) (72 - 83)  BP: 117/65 (14 Jun 2021 13:10) (117/65 - 137/76)  BP(mean): --  RR: 20 (14 Jun 2021 13:10) (18 - 20)  SpO2: --      06-13-21 @ 07:01  -  06-14-21 @ 07:00  --------------------------------------------------------  IN: 300 mL / OUT: 400 mL / NET: -100 mL      PHYSICAL EXAM:  GENERAL: middle-age M-appears chronically ill appearing, NAD, non toxic, sitting up in bed watching tv, malnourished  EYES: anicteric sclera, non injected conjunctiva, EOMI  ENT: hearing grossly intact, oropharynx clear, MMM, fair dentition  PSYCH: no agitation, baseline mentation  NERVOUS SYSTEM:  Alert & Oriented X3, CN 2-12 grossly intact  PULMONARY: Clear to percussion bilaterally; No rales, rhonchi, wheezing, or rubs  CARDIOVASCULAR: Regular rate and rhythm; No murmurs, rubs, or gallops  GI: Soft, Nontender, Nondistended; Bowel sounds present, RUQ scant dark green drainage from fistula track  EXTREMITIES:  2+ Peripheral Pulses, No clubbing, cyanosis, or edema  LYMPH: No lymphadenopathy noted  SKIN: No rashes or lesions    LABS                          8.7    6.79  )-----------( 578      ( 14 Jun 2021 11:38 )             29.9         RADIOLOGY & ADDITIONAL TESTS:  - no new images 6/14    HEALTH ISSUES - PROBLEM Dx:  Knee pain, left    MEDICATIONS  (STANDING):  acetaminophen   Tablet .. 1000 milliGRAM(s) Oral every 8 hours  aspirin  chewable 81 milliGRAM(s) Oral daily  atorvastatin 80 milliGRAM(s) Oral at bedtime  dextrose 40% Gel 15 Gram(s) Oral once  dextrose 5%. 1000 milliLiter(s) (50 mL/Hr) IV Continuous <Continuous>  dextrose 5%. 1000 milliLiter(s) (100 mL/Hr) IV Continuous <Continuous>  dextrose 50% Injectable 25 Gram(s) IV Push once  dextrose 50% Injectable 12.5 Gram(s) IV Push once  dextrose 50% Injectable 25 Gram(s) IV Push once  digoxin     Tablet 125 MICROGram(s) Oral daily  DULoxetine 90 milliGRAM(s) Oral daily  enoxaparin Injectable 40 milliGRAM(s) SubCutaneous daily  ferrous    sulfate 325 milliGRAM(s) Oral daily  gabapentin 600 milliGRAM(s) Oral three times a day  glucagon  Injectable 1 milliGRAM(s) IntraMuscular once  insulin glargine Injectable (LANTUS) 25 Unit(s) SubCutaneous every morning  insulin lispro (ADMELOG) corrective regimen sliding scale   SubCutaneous three times a day before meals  insulin lispro Injectable (ADMELOG) 3 Unit(s) SubCutaneous three times a day before meals  metoprolol succinate ER 25 milliGRAM(s) Oral daily  nafcillin  IVPB 2 Gram(s) IV Intermittent every 4 hours  nystatin Cream 1 Application(s) Topical two times a day  oxyCODONE  ER Tablet 20 milliGRAM(s) Oral every 12 hours  pantoprazole    Tablet 40 milliGRAM(s) Oral before breakfast  prasugrel 10 milliGRAM(s) Oral daily    MEDICATIONS  (PRN):  ALPRAZolam 0.5 milliGRAM(s) Oral every 8 hours PRN anxiety  baclofen 10 milliGRAM(s) Oral three times a day PRN Muscle Spasm  furosemide    Tablet 40 milliGRAM(s) Oral daily PRN fluid overload  ondansetron Injectable 4 milliGRAM(s) IV Push three times a day PRN Nausea and/or Vomiting  oxyCODONE    IR 10 milliGRAM(s) Oral every 4 hours PRN Severe Pain (7 - 10)

## 2021-06-14 NOTE — PROGRESS NOTE ADULT - ASSESSMENT
64 yo male admitted with left septic knee and severe PVD    # Left Knee Septic Arthritis with MSSA bacteremia- resolving  - repeat Blood Cx negative   - S/P left knee wash out by ortho  - Ortho following; recalled yesterday for L knee pain; knee xray as above, grossly unchanged from prior MRI. Will see pt again today          Left knee lateral drain removed (6/1), Medial knee drain and occlusive dressing removed on 6/3  - ID on board     ESR/CRP are improving      continue nafcillin 2g q 4 hours     will need 6 weeks from time of last debridement (5/27-7/7) -- to finish with cefazolin 2g q 8 hours     Weekly CBC, CMP, ESR/CRP     ID follow-up with Dr. Woody Cruz for Telehealth. We will call the patient between 10:30-1:30      [address]       [phone]       Fax [phone]  - PICC line placed on 6/4  - pain management recalled for left knee pain       Continue with oxycodone ER 20mg q12h        Continue with oxycodone IR 10mg q4h PRN for severe pain       Continue with other non-opioid pharmacotherapy to reduce opioid requirements.       #Biliary Drainage from previous perc sudhir site- resolved   - Previous Intraabdominal Cx 9/4/2020 -- VRE faecium and pseudomonas aeruginosa  - Wound Cx 5/1 growing Pseudomonas/VRE Faecium  - CT Abdomen and Pelvis w/ IV Cont (04.26.21 @ 04:42): No evidence of acute intra-abdominal pathology. Decreased area of right upper quadrant subcutaneous stranding at site of prior percutaneous cholecystostomy, without evidence of abscess  - HIDA scan unable to visualize contrast in GB   - spoke w/ Dr. Tello 5/3 after HIDA who recommended outpatient ERCP and cholecystectomy to divert bile and heal fistula  - At this time RUQ tract appears to be closed and well healed w/o active drainage   - LFT's unremarkable   - spoke w/ Dr. Tello again on 6/3 and 6/9 - no plan for acute intervention, OP follow up in 1-2 weeks. Upon discharge please contact [facility] to schedule follow up for ERCP w/stent placement. Will need to be off AC and DAPT 5 days prior to procedure.    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  - No further intervention per Ortho     # PVD  - Arterial doppler 5/29 of bilateral LE show normal arterial flow in the bilateral lower extremities. Limited exam due to bandages  - Vascular Cardiology team following, no further intervention   c/w ASA and Effient and high intensity statin   c/w GDMT for HFrEF     # DM2 c/b hyperglycemia- well controlled  - Lantus 25u in the morning  - cont lispro 3 tid w/ meals      #Microcytic Anemia   - Iron def + ACD?, no evidence of GI bleed  - will start iron supplementation  - o/p GI f/u    #CAD s/p PCI to RCA and OM1, s/p LIMA to LAD (2018)   #Severe ischemic cardiomyopathy w/ reduced EF s/p CRT-D   #HTN   #DLD   - JOHN PAUL 5/2021 w/ ef 20-25%  - on ASA and Effient   - restarted on high intensity statin  - c/w GDMT (Toprol, Aldactone, Digoxn, Lasix prn ) for HFrEF     # Covid vaccine status:   He has received one dose and is supposed to receive the second dose on 5/15/21 but he was already hospitalized. Can receive 2nd dose anytime after leaving hospital.    # DVT ppx: Lovenox  # GI ppx: PPI from home  # Diet: DASH  # Code Status: Full Code      #Progress Note Handoff  Pending (specify): insurance auth. Will need 2nd covid dose upon discharge  Family discussion: had an extensive conversation today eith patient regarding medical updates and plan of care.  Disposition: [place] today-next 24hrs

## 2021-06-15 VITALS
HEART RATE: 82 BPM | TEMPERATURE: 98 F | DIASTOLIC BLOOD PRESSURE: 63 MMHG | SYSTOLIC BLOOD PRESSURE: 115 MMHG | RESPIRATION RATE: 18 BRPM

## 2021-06-15 LAB
ANION GAP SERPL CALC-SCNC: 9 MMOL/L — SIGNIFICANT CHANGE UP (ref 7–14)
BUN SERPL-MCNC: 23 MG/DL — HIGH (ref 10–20)
CALCIUM SERPL-MCNC: 8.2 MG/DL — LOW (ref 8.5–10.1)
CHLORIDE SERPL-SCNC: 104 MMOL/L — SIGNIFICANT CHANGE UP (ref 98–110)
CO2 SERPL-SCNC: 25 MMOL/L — SIGNIFICANT CHANGE UP (ref 17–32)
CREAT SERPL-MCNC: 1 MG/DL — SIGNIFICANT CHANGE UP (ref 0.7–1.5)
CRP SERPL-MCNC: 38 MG/L — HIGH
GLUCOSE BLDC GLUCOMTR-MCNC: 142 MG/DL — HIGH (ref 70–99)
GLUCOSE BLDC GLUCOMTR-MCNC: 210 MG/DL — HIGH (ref 70–99)
GLUCOSE BLDC GLUCOMTR-MCNC: 69 MG/DL — LOW (ref 70–99)
GLUCOSE SERPL-MCNC: 81 MG/DL — SIGNIFICANT CHANGE UP (ref 70–99)
HCT VFR BLD CALC: 27.5 % — LOW (ref 42–52)
HGB BLD-MCNC: 8.2 G/DL — LOW (ref 14–18)
MAGNESIUM SERPL-MCNC: 1.9 MG/DL — SIGNIFICANT CHANGE UP (ref 1.8–2.4)
MCHC RBC-ENTMCNC: 22.4 PG — LOW (ref 27–31)
MCHC RBC-ENTMCNC: 29.8 G/DL — LOW (ref 32–37)
MCV RBC AUTO: 75.1 FL — LOW (ref 80–94)
NRBC # BLD: 0 /100 WBCS — SIGNIFICANT CHANGE UP (ref 0–0)
PHOSPHATE SERPL-MCNC: 4.3 MG/DL — SIGNIFICANT CHANGE UP (ref 2.1–4.9)
PLATELET # BLD AUTO: 509 K/UL — HIGH (ref 130–400)
POTASSIUM SERPL-MCNC: 4.6 MMOL/L — SIGNIFICANT CHANGE UP (ref 3.5–5)
POTASSIUM SERPL-SCNC: 4.6 MMOL/L — SIGNIFICANT CHANGE UP (ref 3.5–5)
RBC # BLD: 3.66 M/UL — LOW (ref 4.7–6.1)
RBC # FLD: 24.9 % — HIGH (ref 11.5–14.5)
SODIUM SERPL-SCNC: 138 MMOL/L — SIGNIFICANT CHANGE UP (ref 135–146)
WBC # BLD: 6.64 K/UL — SIGNIFICANT CHANGE UP (ref 4.8–10.8)
WBC # FLD AUTO: 6.64 K/UL — SIGNIFICANT CHANGE UP (ref 4.8–10.8)

## 2021-06-15 PROCEDURE — 99232 SBSQ HOSP IP/OBS MODERATE 35: CPT

## 2021-06-15 RX ORDER — INSULIN GLARGINE 100 [IU]/ML
20 INJECTION, SOLUTION SUBCUTANEOUS
Qty: 0 | Refills: 0 | DISCHARGE
Start: 2021-06-15

## 2021-06-15 RX ORDER — ALPRAZOLAM 0.25 MG
0.5 TABLET ORAL ONCE
Refills: 0 | Status: DISCONTINUED | OUTPATIENT
Start: 2021-06-15 | End: 2021-06-15

## 2021-06-15 RX ORDER — NYSTATIN CREAM 100000 [USP'U]/G
1 CREAM TOPICAL
Qty: 0 | Refills: 0 | DISCHARGE
Start: 2021-06-15

## 2021-06-15 RX ORDER — NAFCILLIN 10 G/100ML
2 INJECTION, POWDER, FOR SOLUTION INTRAVENOUS
Qty: 0 | Refills: 0 | DISCHARGE
Start: 2021-06-15

## 2021-06-15 RX ORDER — BACLOFEN 100 %
1 POWDER (GRAM) MISCELLANEOUS
Qty: 0 | Refills: 0 | DISCHARGE
Start: 2021-06-15

## 2021-06-15 RX ORDER — FERROUS SULFATE 325(65) MG
1 TABLET ORAL
Qty: 0 | Refills: 0 | DISCHARGE
Start: 2021-06-15

## 2021-06-15 RX ORDER — INSULIN GLARGINE 100 [IU]/ML
20 INJECTION, SOLUTION SUBCUTANEOUS EVERY MORNING
Refills: 0 | Status: DISCONTINUED | OUTPATIENT
Start: 2021-06-15 | End: 2021-06-15

## 2021-06-15 RX ORDER — ACETAMINOPHEN 500 MG
2 TABLET ORAL
Qty: 0 | Refills: 0 | DISCHARGE
Start: 2021-06-15

## 2021-06-15 RX ORDER — DULOXETINE HYDROCHLORIDE 30 MG/1
90 CAPSULE, DELAYED RELEASE ORAL
Qty: 0 | Refills: 0 | DISCHARGE

## 2021-06-15 RX ORDER — GABAPENTIN 400 MG/1
1 CAPSULE ORAL
Qty: 0 | Refills: 0 | DISCHARGE
Start: 2021-06-15

## 2021-06-15 RX ADMIN — OXYCODONE HYDROCHLORIDE 20 MILLIGRAM(S): 5 TABLET ORAL at 05:36

## 2021-06-15 RX ADMIN — Medication 1000 MILLIGRAM(S): at 16:01

## 2021-06-15 RX ADMIN — NAFCILLIN 200 GRAM(S): 10 INJECTION, POWDER, FOR SOLUTION INTRAVENOUS at 17:30

## 2021-06-15 RX ADMIN — PANTOPRAZOLE SODIUM 40 MILLIGRAM(S): 20 TABLET, DELAYED RELEASE ORAL at 05:36

## 2021-06-15 RX ADMIN — DULOXETINE HYDROCHLORIDE 90 MILLIGRAM(S): 30 CAPSULE, DELAYED RELEASE ORAL at 11:43

## 2021-06-15 RX ADMIN — Medication 81 MILLIGRAM(S): at 11:42

## 2021-06-15 RX ADMIN — Medication 1000 MILLIGRAM(S): at 05:32

## 2021-06-15 RX ADMIN — OXYCODONE HYDROCHLORIDE 20 MILLIGRAM(S): 5 TABLET ORAL at 06:57

## 2021-06-15 RX ADMIN — PRASUGREL 10 MILLIGRAM(S): 5 TABLET, FILM COATED ORAL at 13:48

## 2021-06-15 RX ADMIN — Medication 1000 MILLIGRAM(S): at 06:57

## 2021-06-15 RX ADMIN — NAFCILLIN 200 GRAM(S): 10 INJECTION, POWDER, FOR SOLUTION INTRAVENOUS at 04:06

## 2021-06-15 RX ADMIN — GABAPENTIN 600 MILLIGRAM(S): 400 CAPSULE ORAL at 13:48

## 2021-06-15 RX ADMIN — ENOXAPARIN SODIUM 40 MILLIGRAM(S): 100 INJECTION SUBCUTANEOUS at 11:42

## 2021-06-15 RX ADMIN — Medication 4: at 16:35

## 2021-06-15 RX ADMIN — OXYCODONE HYDROCHLORIDE 10 MILLIGRAM(S): 5 TABLET ORAL at 17:03

## 2021-06-15 RX ADMIN — Medication 1000 MILLIGRAM(S): at 13:49

## 2021-06-15 RX ADMIN — GABAPENTIN 600 MILLIGRAM(S): 400 CAPSULE ORAL at 05:31

## 2021-06-15 RX ADMIN — NYSTATIN CREAM 1 APPLICATION(S): 100000 CREAM TOPICAL at 17:04

## 2021-06-15 RX ADMIN — NAFCILLIN 200 GRAM(S): 10 INJECTION, POWDER, FOR SOLUTION INTRAVENOUS at 11:50

## 2021-06-15 RX ADMIN — Medication 0.5 MILLIGRAM(S): at 18:20

## 2021-06-15 RX ADMIN — ONDANSETRON 4 MILLIGRAM(S): 8 TABLET, FILM COATED ORAL at 00:45

## 2021-06-15 RX ADMIN — NAFCILLIN 200 GRAM(S): 10 INJECTION, POWDER, FOR SOLUTION INTRAVENOUS at 05:37

## 2021-06-15 RX ADMIN — NYSTATIN CREAM 1 APPLICATION(S): 100000 CREAM TOPICAL at 05:33

## 2021-06-15 RX ADMIN — Medication 325 MILLIGRAM(S): at 11:42

## 2021-06-15 RX ADMIN — Medication 125 MICROGRAM(S): at 05:31

## 2021-06-15 RX ADMIN — OXYCODONE HYDROCHLORIDE 20 MILLIGRAM(S): 5 TABLET ORAL at 17:04

## 2021-06-15 RX ADMIN — Medication 25 MILLIGRAM(S): at 05:31

## 2021-06-15 NOTE — PROGRESS NOTE ADULT - ASSESSMENT
64 yo male admitted with left septic knee and severe PVD    # Left Knee Septic Arthritis with MSSA bacteremia- resolving  - repeat Blood Cx negative   - S/P left knee wash out by ortho  - Ortho following; recalled yesterday for L knee pain; knee xray as above, grossly unchanged from prior MRI. Will see pt again today          Left knee lateral drain removed (6/1), Medial knee drain and occlusive dressing removed on 6/3  - ID on board     ESR/CRP are improving      continue nafcillin 2g q 4 hours     will need 6 weeks from time of last debridement (5/27-7/7) -- to finish with cefazolin 2g q 8 hours     Weekly CBC, CMP, ESR/CRP     ID follow-up with Dr. Woody Cruz for Telehealth. We will call the patient between 10:30-1:30      [address]       [phone]       Fax [phone]  - PICC line placed on 6/4  - pain management recalled for left knee pain       Continue with oxycodone ER 20mg q12h        Continue with oxycodone IR 10mg q4h PRN for severe pain       Continue with other non-opioid pharmacotherapy to reduce opioid requirements.       #Biliary Drainage from previous perc sudhir site- resolved   - Previous Intraabdominal Cx 9/4/2020 -- VRE faecium and pseudomonas aeruginosa  - Wound Cx 5/1 growing Pseudomonas/VRE Faecium  - CT Abdomen and Pelvis w/ IV Cont (04.26.21 @ 04:42): No evidence of acute intra-abdominal pathology. Decreased area of right upper quadrant subcutaneous stranding at site of prior percutaneous cholecystostomy, without evidence of abscess  - HIDA scan unable to visualize contrast in GB   - spoke w/ Dr. Tello 5/3 after HIDA who recommended outpatient ERCP and cholecystectomy to divert bile and heal fistula  - At this time RUQ tract appears to be closed and well healed w/o active drainage   - LFT's unremarkable   - spoke w/ Dr. Tello again on 6/3 and 6/9 - no plan for acute intervention, OP follow up in 1-2 weeks. Upon discharge please contact [facility] to schedule follow up for ERCP w/stent placement. Will need to be off AC and DAPT 5 days prior to procedure.    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  - No further intervention per Ortho     # PVD  - Arterial doppler 5/29 of bilateral LE show normal arterial flow in the bilateral lower extremities. Limited exam due to bandages  - Vascular Cardiology team following, no further intervention   c/w ASA and Effient and high intensity statin   c/w GDMT for HFrEF     # DM2 c/b hyperglycemia- well controlled  - Lantus 25u in the morning  - cont lispro 3 tid w/ meals      #Microcytic Anemia   - Iron def + ACD?, no evidence of GI bleed  - will start iron supplementation  - o/p GI f/u    #CAD s/p PCI to RCA and OM1, s/p LIMA to LAD (2018)   #Severe ischemic cardiomyopathy w/ reduced EF s/p CRT-D   #HTN   #DLD   - JOHN PAUL 5/2021 w/ ef 20-25%  - on ASA and Effient   - restarted on high intensity statin  - c/w GDMT (Toprol, Aldactone, Digoxn, Lasix prn ) for HFrEF     # Covid vaccine status:   He has received one dose and is supposed to receive the second dose on 5/15/21 but he was already hospitalized. Can receive 2nd dose anytime after leaving hospital.    # DVT ppx: Lovenox  # GI ppx: PPI from home  # Diet: DASH  # Code Status: Full Code      #Progress Note Handoff  Pending (specify): insurance auth.  Family discussion: had an extensive conversation today with patient regarding medical updates and plan of care.  Disposition: [place] today-next 24hrs

## 2021-06-15 NOTE — PROGRESS NOTE ADULT - SUBJECTIVE AND OBJECTIVE BOX
FLOWER MARISCAL  65y  Male      Patient is a 65y old  Male who presents with a chief complaint of Septic arthritis (14 Jun 2021 13:28)      INTERVAL HPI/OVERNIGHT EVENTS: no acute events overnight. wife at bedside shaving him. pain well controlled.      REVIEW OF SYSTEMS:  CONSTITUTIONAL: No fever, weight loss, or fatigue  RESPIRATORY: No cough, wheezing, chills or hemoptysis; No shortness of breath  CARDIOVASCULAR: No chest pain, palpitations, dizziness, or leg swelling  GASTROINTESTINAL: No abdominal or epigastric pain. No nausea, vomiting, or hematemesis; No diarrhea or constipation. No melena or hematochezia.  GENITOURINARY: No dysuria, frequency, hematuria, or incontinence  NEUROLOGICAL: No headaches, memory loss, loss of strength, numbness, or tremors  SKIN: No itching, burning, rashes, or lesions   MUSCULOSKELETAL: No joint pain or swelling; No muscle, back, or extremity pain  PSYCHIATRIC: No depression, anxiety, mood swings, or difficulty sleeping  All other review of systems negative    VITALS  T(C): 36.6 (06-15-21 @ 12:07), Max: 36.6 (06-15-21 @ 12:07)  HR: 82 (06-15-21 @ 12:07) (75 - 82)  BP: 115/63 (06-15-21 @ 12:07) (108/61 - 120/70)  RR: 18 (06-15-21 @ 12:07) (18 - 18)  SpO2: --  Wt(kg): --Vital Signs Last 24 Hrs  T(C): 36.6 (15 Nael 2021 12:07), Max: 36.6 (15 Nael 2021 12:07)  T(F): 97.8 (15 Nael 2021 12:07), Max: 97.8 (15 Nael 2021 12:07)  HR: 82 (15 Nael 2021 12:07) (75 - 82)  BP: 115/63 (15 Nael 2021 12:07) (108/61 - 120/70)  BP(mean): --  RR: 18 (15 Nael 2021 12:07) (18 - 18)  SpO2: --      06-14-21 @ 07:01  -  06-15-21 @ 07:00  --------------------------------------------------------  IN: 100 mL / OUT: 0 mL / NET: 100 mL    06-15-21 @ 07:01  -  06-15-21 @ 13:35  --------------------------------------------------------  IN: 0 mL / OUT: 300 mL / NET: -300 mL      PHYSICAL EXAM:  GENERAL: middle-age M-appears chronically ill appearing, NAD, non toxic, sitting up in bed watching tv, malnourished  EYES: anicteric sclera, non injected conjunctiva, EOMI  ENT: hearing grossly intact, oropharynx clear, MMM, fair dentition  PSYCH: no agitation, baseline mentation  NERVOUS SYSTEM:  Alert & Oriented X3, CN 2-12 grossly intact  PULMONARY: Clear to percussion bilaterally; No rales, rhonchi, wheezing, or rubs  CARDIOVASCULAR: Regular rate and rhythm; No murmurs, rubs, or gallops  GI: Soft, Nontender, Nondistended; Bowel sounds present, RUQ scant dark green drainage from fistula track  EXTREMITIES:  2+ Peripheral Pulses, No clubbing, cyanosis, or edema  LYMPH: No lymphadenopathy noted  SKIN: No rashes or lesions    Consultant(s) Notes Reviewed:  [x ] YES  [ ] NO    Discussed with Consultants/Other Providers [ x] YES     LABS                          8.2    6.64  )-----------( 509      ( 15 Nael 2021 06:15 )             27.5     06-15    138  |  104  |  23<H>  ----------------------------<  81  4.6   |  25  |  1.0    Ca    8.2<L>      15 Nael 2021 06:15  Phos  4.3     06-15  Mg     1.9     06-15        RADIOLOGY & ADDITIONAL TESTS:  - no new images 6/15    HEALTH ISSUES - PROBLEM Dx:  Knee pain, left    MEDICATIONS  (STANDING):  acetaminophen   Tablet .. 1000 milliGRAM(s) Oral every 8 hours  aspirin  chewable 81 milliGRAM(s) Oral daily  atorvastatin 80 milliGRAM(s) Oral at bedtime  dextrose 40% Gel 15 Gram(s) Oral once  dextrose 5%. 1000 milliLiter(s) (50 mL/Hr) IV Continuous <Continuous>  dextrose 5%. 1000 milliLiter(s) (100 mL/Hr) IV Continuous <Continuous>  dextrose 50% Injectable 25 Gram(s) IV Push once  dextrose 50% Injectable 12.5 Gram(s) IV Push once  dextrose 50% Injectable 25 Gram(s) IV Push once  digoxin     Tablet 125 MICROGram(s) Oral daily  DULoxetine 90 milliGRAM(s) Oral daily  enoxaparin Injectable 40 milliGRAM(s) SubCutaneous daily  ferrous    sulfate 325 milliGRAM(s) Oral daily  gabapentin 600 milliGRAM(s) Oral three times a day  glucagon  Injectable 1 milliGRAM(s) IntraMuscular once  insulin glargine Injectable (LANTUS) 25 Unit(s) SubCutaneous every morning  insulin lispro (ADMELOG) corrective regimen sliding scale   SubCutaneous three times a day before meals  insulin lispro Injectable (ADMELOG) 3 Unit(s) SubCutaneous three times a day before meals  metoprolol succinate ER 25 milliGRAM(s) Oral daily  nafcillin  IVPB 2 Gram(s) IV Intermittent every 4 hours  nystatin Cream 1 Application(s) Topical two times a day  oxyCODONE  ER Tablet 20 milliGRAM(s) Oral every 12 hours  pantoprazole    Tablet 40 milliGRAM(s) Oral before breakfast  prasugrel 10 milliGRAM(s) Oral daily    MEDICATIONS  (PRN):  ALPRAZolam 0.5 milliGRAM(s) Oral every 8 hours PRN anxiety  baclofen 10 milliGRAM(s) Oral three times a day PRN Muscle Spasm  furosemide    Tablet 40 milliGRAM(s) Oral daily PRN fluid overload  ondansetron Injectable 4 milliGRAM(s) IV Push three times a day PRN Nausea and/or Vomiting  oxyCODONE    IR 10 milliGRAM(s) Oral every 4 hours PRN Severe Pain (7 - 10)

## 2021-06-15 NOTE — PROGRESS NOTE ADULT - REASON FOR ADMISSION
Septic arthritis

## 2021-06-15 NOTE — PROGRESS NOTE ADULT - NSICDXPILOT_GEN_ALL_CORE
[place]

## 2021-06-30 LAB
CULTURE RESULTS: SIGNIFICANT CHANGE UP
SPECIMEN SOURCE: SIGNIFICANT CHANGE UP

## 2021-07-01 NOTE — CDI QUERY NOTE - NSCDIOTHERTXTBX_GEN_ALL_CORE_HH
Query 1      DC Summary:   Patient admitted with septic arthritis and MSSA bacteremia.  Pt endorses a weight loss >70 lbs over the last year,     Chart note nutritional Services Dr. Nicolas 4/27:     Assessment....  - severe malnutrition, marasmus type  - severe wt loss, ~29% over the past year  - r/o nutrient deficiencies    PLAN  - cont current PO diet as tolerated  - add Glucerna shakes 240ml PO q8hrs  - add SF Prosource gelatein 4oz PO q24hrs  - add MVI with minerals 1 tab PO q24hrs, vitamin C 500mg daily  - if resume pt's iron supplements he should take each dose with 250mg vitamin C  - check vitamin D 25-OH, zinc, selenium, carnitine levels with next blood draw  - send iron studies  - glycemic control  - will follow.      Based on the above clinical evidence, can the severe malnutrition, documented by Dr. Nicolas - nutrition services, be further clarified as:    - Severe malnutrition was evaluated and ruled in during this admission.  - Severe malnutrition was evaluated and ruled out  - Other  - Unable to determine

## 2021-07-27 ENCOUNTER — OUTPATIENT (OUTPATIENT)
Dept: OUTPATIENT SERVICES | Facility: HOSPITAL | Age: 66
LOS: 1 days | Discharge: HOME | End: 2021-07-27
Payer: COMMERCIAL

## 2021-07-27 DIAGNOSIS — Z43.4 ENCOUNTER FOR ATTENTION TO OTHER ARTIFICIAL OPENINGS OF DIGESTIVE TRACT: Chronic | ICD-10-CM

## 2021-07-27 DIAGNOSIS — M25.562 PAIN IN LEFT KNEE: ICD-10-CM

## 2021-07-27 DIAGNOSIS — Z95.810 PRESENCE OF AUTOMATIC (IMPLANTABLE) CARDIAC DEFIBRILLATOR: Chronic | ICD-10-CM

## 2021-07-27 DIAGNOSIS — Z96.651 PRESENCE OF RIGHT ARTIFICIAL KNEE JOINT: Chronic | ICD-10-CM

## 2021-07-27 DIAGNOSIS — Z41.9 ENCOUNTER FOR PROCEDURE FOR PURPOSES OTHER THAN REMEDYING HEALTH STATE, UNSPECIFIED: Chronic | ICD-10-CM

## 2021-07-27 DIAGNOSIS — Z96.641 PRESENCE OF RIGHT ARTIFICIAL HIP JOINT: Chronic | ICD-10-CM

## 2021-07-27 DIAGNOSIS — Z90.89 ACQUIRED ABSENCE OF OTHER ORGANS: Chronic | ICD-10-CM

## 2021-07-27 DIAGNOSIS — Z95.5 PRESENCE OF CORONARY ANGIOPLASTY IMPLANT AND GRAFT: Chronic | ICD-10-CM

## 2021-07-27 DIAGNOSIS — Z98.890 OTHER SPECIFIED POSTPROCEDURAL STATES: Chronic | ICD-10-CM

## 2021-07-27 DIAGNOSIS — Z95.1 PRESENCE OF AORTOCORONARY BYPASS GRAFT: Chronic | ICD-10-CM

## 2021-07-27 PROCEDURE — 73721 MRI JNT OF LWR EXTRE W/O DYE: CPT | Mod: 26,LT

## 2021-08-09 NOTE — PROGRESS NOTE BEHAVIORAL HEALTH - NSBHCONSORIP_PSY_A_CORE
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	     Goal: pt to consume/tolerate >75% of meals/snacks and po supplements in 4 days and gain 1lb per wk.    Recommendations:   1. Add beneprotein daily. 2. Add magic cup daily. 3. cont w/ current diet order.
Consult...

## 2021-08-17 NOTE — ED ADULT NURSE NOTE - NSFALLRSKHARMRISK_ED_ALL_ED
[address] [address]  Phone: [phone]  Fax: [phone]    NICOLE Kent CNP    August 17, 2021     Nasim Ballesteros, [address] [facility] [address] [facility]  [address] [facility] [address]    Patient: Mahamed Roman   MR Number: [id number]   YOB: 1962   Date of Visit: 8/17/2021       Dear Nasim Ballesteros: Thank you for referring Pilar Moyer to me for evaluation/treatment. Below are the relevant portions of my assessment and plan of care. 1. Edema of scrotum    2. History of hydrocele    3. Urinary frequency    4. Nocturia        1. Elevate scrotum using a towel or pillow case. May use ice  20 mins on, 20 mins off which may show improvement in the swelling. Like d/t patient sitting for prolonged periods of time in wheelchair and his chair at home- he admits he \"does absolutely nothing, not even cooking\" at home and his fluid retention d/t CHF. 2. Will get repeat scrotal uls to reassess hydrocele. Poor surgical candidate d/t extensive cardiac and pulmonary history. If no improvement, he would be interested in aspiration. 3. May use barrier cream to help with excoriation to scrotum. 4. F/u 1 mos scrotal uls/scrotal swelling with Dr. Luda Mir. Return in about 4 weeks (around 9/14/2021) for scrotal u/s- with Dr. Luda Mir. .    Prescriptions Ordered:  No orders of the defined types were placed in this encounter. Orders Placed:  Orders Placed This Encounter   Procedures    US SCROTUM AND TESTICLES     Please schedule with doppler     Standing Status:   Future     Standing Expiration Date:   8/12/2022        If you have questions, please do not hesitate to call me. I look forward to following Miguel along with you.     Sincerely,    NICOLE Kent CNP
yes

## 2021-08-19 ENCOUNTER — RX RENEWAL (OUTPATIENT)
Age: 66
End: 2021-08-19

## 2021-08-24 NOTE — ED ADULT NURSE NOTE - CHIEF COMPLAINT QUOTE
Pt CO Right knee pain and swelling x 2 days.  Pt states "The knee was replaced in October and I'm concerned it's septic."  low grade fever noted in triage.  Pt denies falls, trauma, N/V/D, SOB, CP, Dizziness. Tried calling pt to schedule fu appt but phone rings busy / bad #?

## 2021-08-25 NOTE — DIETITIAN INITIAL EVALUATION ADULT. - PERTINENT LABORATORY DATA
"  History   Chief Complaint:  Anxiety and Palpitations       HPI   January Dickens is a 43 year old female with history of burnt mouth syndrome who presents with anxiety and palpitations. The patient states she has been feeling unwell and panicked for about 3 months. She has noticed elevated and low blood pressures which correspond with this feeling or panic and anxiety. Over the last few days this has worsened. The patient tried taking Lorazepam but felt no relief of symptoms. She notes urinary frequency but no dysuria or hematuria. The patient notes lots of life stressors over the past year. Of note she has an appointment with her therapist next week and a psychiatrist September 1st.     Review of Systems   Genitourinary: Positive for frequency. Negative for dysuria and hematuria.   Psychiatric/Behavioral: The patient is nervous/anxious.    All other systems reviewed and are negative.      Allergies:  The patient has no known allergies.       Medications:  Atarax  Nicoderm      Past Medical History:    MERI  GERD  Mononucleosis  Depression  Psoriasis  Right leg numbness   Depression  Tobacco use     Past Surgical History:    Mouth surgery    Family History:    Sjorgens  Thyroid disease  Diabetes    Social History:  Patient presents with self  Patient work in research  Patient smokes 3-5 cigarettes a day  Physical Exam     Patient Vitals for the past 24 hrs:   BP Temp Temp src Pulse Resp SpO2 Height Weight   08/25/21 1311 (!) 146/90 98.5  F (36.9  C) Oral 96 16 99 % 1.651 m (5' 5\") 68.9 kg (152 lb)   08/25/21 1310 (!) 146/90 -- -- 90 12 98 % -- --       Physical Exam  Physical Exam   General:  Sitting on bed   HENT:  No obvious trauma to head  Right Ear:  External ear normal.   Left Ear:  External ear normal.   Nose:  Nose normal.   Eyes:  Conjunctivae and EOM are normal. Pupils are equal, round, and reactive.   Neck: Normal range of motion. Neck supple. No tracheal deviation present.   CV:  Normal heart sounds. No " murmur heard.  Pulm/Chest: Effort normal and breath sounds normal.   Abd: Soft. No distension. There is no tenderness. There is no rigidity, no rebound and no guarding.   M/S: Normal range of motion.   Neuro: Alert. GCS 15.  Skin: Skin is warm and dry. No rash noted. Not diaphoretic.   Psych: Normal mood and affect. Behavior is normal.       Emergency Department Course     ECG  ECG taken at 1306, ECG read at 1307  Sinus rhythm with short AZ. T wave abnormality, consider inferior ischemia. Prolonged QT. Abnormal ECG   Rate 98 bpm. AZ interval 110 ms. QRS duration 92 ms. QT/QTc 428/546 ms. P-R-T axes 65 62 50.       Laboratory:    CBC: WBC: 5.8, HGB: 13.8, PLT: 307  BMP:AWNL (Creatinine: 0.65)      Emergency Department Course:    Reviewed:  I reviewed the patient's nursing notes, vitals, past medical records, Care Everywhere.     Assessments/Consults:  ED Course as of Aug 25 1511   Wed Aug 25, 2021   1404 I performed exam and assessment      1416 I rechecked the patient            Disposition:  The patient was discharged to home.     Impression & Plan   Medical Decision Making:  January Dickens is a very pleasant 43 year old female presents for evaluation of anxiety and occasional palpitations.  Initial ECG shows prolonged QT. The differential diagnosis included but was not limited to  arrythmia, thyroid disease, acute electrolyte abnormality,  drugs/medications, caffeine intake or other stimulants, medication side effect, anemia, heart disease, pulmonary embolism, etc.  I do not feel that further work up for PE is indicated due to no shortness of breath, hypoxia or risk factors for. The workup and exam here in ED is reassuring and nondiagnostic as outlined above. No arrythmia or findings concerning for arrythmia were noted on the EKG other than the prolonged QT interval. With reasonable clinical certainty I feel that the patient is safe for discharge home for ongoing evaluation and management as an outpatient.  She  was mainly worried about her anxiety.  She was already prescribed hydroxyzine by her primary that she has not tried taking.  I recommended she take 25 mg when she gets home and she could take 50 mg before bed if she is still anxious.  I provided a referral to EP cardiology and placed a order for consult in the computer for her.  The patient understands to return to the ED with new or worsening symptoms and to arrange for close follow up.  It is recommended the patient follow up out patient with cardiologist. This has been requested.     The treatment plan was discussed with the patient and they expressed understanding of this plan and consented to the plan.  In addition, the patient will return to the emergency department if their symptoms persist, worsen, if new symptoms arise or if there is any concern as other pathology may be present that is not evident at this time. They also understand the importance of close follow up in the clinic and if unable to do so will return to the emergency department for a reevaluation. All questions were answered.    Diagnosis:    ICD-10-CM    1. Palpitations  R00.2 Follow-Up with Electrophysiologist   2. Prolonged QT interval  R94.31 Follow-Up with Electrophysiologist           Plan:  1. Return to the ED with new or worse symptoms  2. Follow up with your PMD in 1-2  days for ongoing evaluation and management  3. [facility]           Diagnosis:    ICD-10-CM    1. Palpitations  R00.2 Follow-Up with Electrophysiologist   2. Prolonged QT interval  R94.31 Follow-Up with Electrophysiologist       Discharge Medications:  Discharge Medication List as of 8/25/2021  2:07 PM          Scribe Disclosure:  LUKASZ, Pee Cantu, am serving as a scribe at 1:59 PM on 8/25/2021 to document services personally performed by Mayco Carson DO based on my observations and the provider's statements to me.        Mayco Carson,  DO  08/25/21 1512     (2/24): BUN 26, Gluc 179, POCT 174

## 2021-09-05 NOTE — DIETITIAN INITIAL EVALUATION ADULT. - PERTINENT LABORATORY DATA
() H/H: 10.4/34.0, Cl: 96, BUN: 30,Ca: 9.52 (corrected for low calcium), M.6, Serum Glucose: 245, HgbA1c: 9.1 ()  POCT Glucose: () 234, () 149, (2/3) 184, (2/) 158
The patient is a 26y Male complaining of AMS

## 2021-09-16 ENCOUNTER — NON-APPOINTMENT (OUTPATIENT)
Age: 66
End: 2021-09-16

## 2021-09-24 NOTE — ED ADULT TRIAGE NOTE - MEANS OF ARRIVAL
ambulatory Dupixent Counseling: I discussed with the patient the risks of dupilumab including but not limited to eye infection and irritation, cold sores, injection site reactions, worsening of asthma, allergic reactions and increased risk of parasitic infection.  Live vaccines should be avoided while taking dupilumab. Dupilumab will also interact with certain medications such as warfarin and cyclosporine. The patient understands that monitoring is required and they must alert us or the primary physician if symptoms of infection or other concerning signs are noted.

## 2021-10-01 ENCOUNTER — RX RENEWAL (OUTPATIENT)
Age: 66
End: 2021-10-01

## 2021-10-18 DIAGNOSIS — M00.9 PYOGENIC ARTHRITIS, UNSPECIFIED: ICD-10-CM

## 2021-10-18 NOTE — PATIENT PROFILE ADULT - FALLEN IN THE PAST
ATTENDING PHYSICIAN:  Dr. Jones.



CHIEF COMPLAINT:  Shortness of breath.



HISTORY OF PRESENT ILLNESS:  The patient is a 66-year-old gentleman, otherwise 

healthy.  He was diagnosed with COVID-19 infection a week ago.  He waited 

several days, was not doing too bad.  He got checked in outlying facility, 

tested positive for COVID.  He was sent home.  He has an oximeter at home, 

averaging 88%.  He came to the ED.  He has had a dry nonproductive cough, 

low-grade fevers.  Chest x-ray demonstrated diffuse patchy infiltrates in both 

the lower lobes.  He is admitted then with worsening symptoms of hypoxemia 

related to COVID-19 exposure.  Unfortunately, he has not been vaccinated.



PAST MEDICAL HISTORY:  Unremarkable for any chronic illnesses.  He has no 

prescription meds.  There is no history of diabetes or hypertension.



ALLERGIES:  He has allergies to PENICILLIN, IBUPROFEN, IODINE, exact reaction is

unclear.



MEDICATIONS:  No prescription meds.



SOCIAL HISTORY:  He is a nonsmoker.  He drinks socially.



FAMILY HISTORY:  His mom and dad are still alive in their mid 80s and fairly 

healthy.  He is , 2 grown children are alive and healthy.



REVIEW OF SYSTEMS:  Significant for the COVID exposure a week ago.  He has not 

been vaccinated. No nausea, vomiting or diarrhea.  He still has a sense of 

taste.  All other systems reviewed and determined to be negative.



PHYSICAL EXAMINATION:

GENERAL:  When I saw him, this is a pleasant, middle-aged male.

INITIAL VITAL SIGNS:  Initial vital signs showed a blood pressure 121/73, pulse 

is 60 and regular.  He was afebrile. Oxygen saturation now is 92% on 2 liters by

nasal cannula.

HEENT:  Head is without trauma.  Pupils are reactive.  Sclerae nonicteric.  

Oropharynx is clear.

NECK:  Supple, no bruits.

LUNGS:  Minimal rhonchi at the bases.

CARDIOVASCULAR:  Regular heart tones.  No obvious gallops.  Peripheral pulses 

are palpable and full.

ABDOMEN:  Soft, nontender to palpation.  Bowel sounds are normoactive.

EXTREMITIES:  Show no cyanosis or edema.

NEUROLOGIC FINDINGS:  Focally intact.



PERTINENT LABORATORY STUDIES:  Hemoglobin is 15.2 g/dL with a white count of 

7900.  His sodium is 129 mEq, potassium 4.2, creatinine 0.8 mg%.  Nonfasting 

blood sugar 155.  Cardiac enzymes negative.  Transaminases were normal.  

Urinalysis was clear.



ASSESSMENT:

1.  A 66-year-old gentleman with COVID-19 pneumonia bilaterally.

2.  Hypoxemia requiring supplemental oxygen.



PLAN:

1.  Admit to the inpatient unit.

2.  Intravenous Solu-Medrol has been ordered, I do want to give him some larger 

doses and the recommended Decadron.

3.  We should try to wean him down supplemental oxygen.

4.  Empiric Lovenox.







ZULEIKA/PRICILLA

DR: Nick   DD: 10/18/2021 11:04

DT: 10/18/2021 11:34   TID: [id number]
no

## 2021-10-20 ENCOUNTER — LABORATORY RESULT (OUTPATIENT)
Age: 66
End: 2021-10-20

## 2021-10-21 LAB
ALBUMIN SERPL ELPH-MCNC: 4 G/DL
ALP BLD-CCNC: 96 U/L
ALT SERPL-CCNC: 18 U/L
ANION GAP SERPL CALC-SCNC: 13 MMOL/L
AST SERPL-CCNC: 15 U/L
BASOPHILS # BLD AUTO: 0.09 K/UL
BASOPHILS NFR BLD AUTO: 0.9 %
BILIRUB SERPL-MCNC: 0.4 MG/DL
BUN SERPL-MCNC: 31 MG/DL
CALCIUM SERPL-MCNC: 9.6 MG/DL
CHLORIDE SERPL-SCNC: 101 MMOL/L
CO2 SERPL-SCNC: 24 MMOL/L
CREAT SERPL-MCNC: 0.94 MG/DL
CRP SERPL-MCNC: 51 MG/L
EOSINOPHIL # BLD AUTO: 0.39 K/UL
EOSINOPHIL NFR BLD AUTO: 4 %
ERYTHROCYTE [SEDIMENTATION RATE] IN BLOOD BY WESTERGREN METHOD: 87 MM/HR
GLUCOSE SERPL-MCNC: 89 MG/DL
HCT VFR BLD CALC: 34.8 %
HGB BLD-MCNC: 10.2 G/DL
IMM GRANULOCYTES NFR BLD AUTO: 0.5 %
LYMPHOCYTES # BLD AUTO: 1.55 K/UL
LYMPHOCYTES NFR BLD AUTO: 15.8 %
MAN DIFF?: NORMAL
MCHC RBC-ENTMCNC: 22.3 PG
MCHC RBC-ENTMCNC: 29.3 GM/DL
MCV RBC AUTO: 76.1 FL
MONOCYTES # BLD AUTO: 1.44 K/UL
MONOCYTES NFR BLD AUTO: 14.7 %
NEUTROPHILS # BLD AUTO: 6.27 K/UL
NEUTROPHILS NFR BLD AUTO: 64.1 %
PLATELET # BLD AUTO: 258 K/UL
POTASSIUM SERPL-SCNC: 5.1 MMOL/L
PROT SERPL-MCNC: 7.5 G/DL
RBC # BLD: 4.57 M/UL
RBC # FLD: 25.2 %
SODIUM SERPL-SCNC: 138 MMOL/L
WBC # FLD AUTO: 9.79 K/UL

## 2021-10-22 ENCOUNTER — LABORATORY RESULT (OUTPATIENT)
Age: 66
End: 2021-10-22

## 2021-10-22 ENCOUNTER — OUTPATIENT (OUTPATIENT)
Dept: OUTPATIENT SERVICES | Facility: HOSPITAL | Age: 66
LOS: 1 days | Discharge: HOME | End: 2021-10-22

## 2021-10-22 ENCOUNTER — NON-APPOINTMENT (OUTPATIENT)
Age: 66
End: 2021-10-22

## 2021-10-22 DIAGNOSIS — Z96.641 PRESENCE OF RIGHT ARTIFICIAL HIP JOINT: Chronic | ICD-10-CM

## 2021-10-22 DIAGNOSIS — Z98.890 OTHER SPECIFIED POSTPROCEDURAL STATES: Chronic | ICD-10-CM

## 2021-10-22 DIAGNOSIS — Z95.810 PRESENCE OF AUTOMATIC (IMPLANTABLE) CARDIAC DEFIBRILLATOR: Chronic | ICD-10-CM

## 2021-10-22 DIAGNOSIS — Z90.89 ACQUIRED ABSENCE OF OTHER ORGANS: Chronic | ICD-10-CM

## 2021-10-22 DIAGNOSIS — Z95.1 PRESENCE OF AORTOCORONARY BYPASS GRAFT: Chronic | ICD-10-CM

## 2021-10-22 DIAGNOSIS — Z96.651 PRESENCE OF RIGHT ARTIFICIAL KNEE JOINT: Chronic | ICD-10-CM

## 2021-10-22 DIAGNOSIS — Z95.5 PRESENCE OF CORONARY ANGIOPLASTY IMPLANT AND GRAFT: Chronic | ICD-10-CM

## 2021-10-22 DIAGNOSIS — Z43.4 ENCOUNTER FOR ATTENTION TO OTHER ARTIFICIAL OPENINGS OF DIGESTIVE TRACT: Chronic | ICD-10-CM

## 2021-10-22 DIAGNOSIS — Z11.59 ENCOUNTER FOR SCREENING FOR OTHER VIRAL DISEASES: ICD-10-CM

## 2021-10-22 DIAGNOSIS — Z41.9 ENCOUNTER FOR PROCEDURE FOR PURPOSES OTHER THAN REMEDYING HEALTH STATE, UNSPECIFIED: Chronic | ICD-10-CM

## 2021-10-25 ENCOUNTER — APPOINTMENT (OUTPATIENT)
Dept: VASCULAR SURGERY | Facility: CLINIC | Age: 66
End: 2021-10-25

## 2021-10-26 ENCOUNTER — OUTPATIENT (OUTPATIENT)
Dept: OUTPATIENT SERVICES | Facility: HOSPITAL | Age: 66
LOS: 1 days | Discharge: HOME | End: 2021-10-26
Payer: COMMERCIAL

## 2021-10-26 ENCOUNTER — RESULT REVIEW (OUTPATIENT)
Age: 66
End: 2021-10-26

## 2021-10-26 DIAGNOSIS — Z98.890 OTHER SPECIFIED POSTPROCEDURAL STATES: Chronic | ICD-10-CM

## 2021-10-26 DIAGNOSIS — Z95.1 PRESENCE OF AORTOCORONARY BYPASS GRAFT: Chronic | ICD-10-CM

## 2021-10-26 DIAGNOSIS — Z90.89 ACQUIRED ABSENCE OF OTHER ORGANS: Chronic | ICD-10-CM

## 2021-10-26 DIAGNOSIS — Z41.9 ENCOUNTER FOR PROCEDURE FOR PURPOSES OTHER THAN REMEDYING HEALTH STATE, UNSPECIFIED: Chronic | ICD-10-CM

## 2021-10-26 DIAGNOSIS — M00.862 ARTHRITIS DUE TO OTHER BACTERIA, LEFT KNEE: ICD-10-CM

## 2021-10-26 DIAGNOSIS — Z95.5 PRESENCE OF CORONARY ANGIOPLASTY IMPLANT AND GRAFT: Chronic | ICD-10-CM

## 2021-10-26 DIAGNOSIS — Z43.4 ENCOUNTER FOR ATTENTION TO OTHER ARTIFICIAL OPENINGS OF DIGESTIVE TRACT: Chronic | ICD-10-CM

## 2021-10-26 DIAGNOSIS — Z95.810 PRESENCE OF AUTOMATIC (IMPLANTABLE) CARDIAC DEFIBRILLATOR: Chronic | ICD-10-CM

## 2021-10-26 DIAGNOSIS — Z96.641 PRESENCE OF RIGHT ARTIFICIAL HIP JOINT: Chronic | ICD-10-CM

## 2021-10-26 DIAGNOSIS — Z96.651 PRESENCE OF RIGHT ARTIFICIAL KNEE JOINT: Chronic | ICD-10-CM

## 2021-10-26 PROCEDURE — 73723 MRI JOINT LWR EXTR W/O&W/DYE: CPT | Mod: 26,LT

## 2021-10-29 ENCOUNTER — APPOINTMENT (OUTPATIENT)
Dept: VASCULAR SURGERY | Facility: CLINIC | Age: 66
End: 2021-10-29
Payer: COMMERCIAL

## 2021-10-29 PROCEDURE — 99212 OFFICE O/P EST SF 10 MIN: CPT

## 2021-11-04 ENCOUNTER — APPOINTMENT (OUTPATIENT)
Dept: ENDOCRINOLOGY | Facility: CLINIC | Age: 66
End: 2021-11-04

## 2021-11-04 ENCOUNTER — APPOINTMENT (OUTPATIENT)
Dept: ENDOCRINOLOGY | Facility: CLINIC | Age: 66
End: 2021-11-04
Payer: COMMERCIAL

## 2021-11-04 VITALS
DIASTOLIC BLOOD PRESSURE: 70 MMHG | BODY MASS INDEX: 23.19 KG/M2 | OXYGEN SATURATION: 98 % | HEIGHT: 73 IN | HEART RATE: 70 BPM | WEIGHT: 175 LBS | SYSTOLIC BLOOD PRESSURE: 126 MMHG | TEMPERATURE: 97.2 F

## 2021-11-04 PROCEDURE — 99215 OFFICE O/P EST HI 40 MIN: CPT

## 2021-11-05 NOTE — HISTORY OF PRESENT ILLNESS
[FreeTextEntry1] : 66 year old male with PMH of CAD s/p MI, PCI/CABG, CHFrEF (15-20%), has ICD, HTN, celiac disease, DM, neuropathy, chronic b/l LE ulcers presents, severe chronic pain,  hx infected R TKR with MRSA (was eventually cemented so has limited ROM R knee), and history of cholecystostomy tube placement in February 2020 for acute cholecystitis s/p removal in May 2020 with multiple presentations including persistent bilious drainage from the prior tract site as well as a RUQ abdominal abscess at the site s/p drainage. He was admitted to [facility] due to septic arthritis in June, 2021 where they performed arthroscopic drainage. He is here for sympathectomy evaluation as per Dr. Castro's recommendations. He has severe nerve pain on his L thigh and would like a recommendation for radiofrequency ablation. Furthermore, he mentions he has nonhealing ulcers on both shins for over 2 years. He has tried debridement and Silvadene with no signs of improvement. \par Patient ambulates now with a wheelchair.\par \par

## 2021-11-05 NOTE — ASSESSMENT
[Ulcer Care] : ulcer care [FreeTextEntry1] : 66 year old male with PMH of CAD s/p MI, PCI/CABG, CHFrEF (15-20%), has ICD, HTN, celiac disease, DM, neuropathy, chronic b/l LE ulcers presents, severe chronic pain,  hx infected R TKR with MRSA (was eventually cemented so has limited ROM R knee), presents for a follow up.\par Patient is a wheelchair bound and with persistent neurologic pain of his LLE.\par Non-healing wounds on L and R medial shins with no signs of infection. Discussed sympathectomy procedure and radiofrequency ablation therapy. Answered all of his questions to his satisfaction. At this time due to his LLE nerve pain, I recommend to have radiofrequency ablation. No vascular intervention required at this time. He should follow up with us PRN or if any new symptoms arise. I will follow up with Dr. CASTLE to clarify treatment that was recommended.

## 2021-11-05 NOTE — ADDENDUM
[FreeTextEntry1] : This note was written by Whit Enrique on 10/29/2021 acting as scribe for Noel Palmer M.D.\par I, Dr. Noel Malloy, personally performed the evaluation and management (E/M) services for this established patient who presents today with (an) existing condition(s).  That E/M includes conducting the examination, assessing all conditions, and (re)establishing/reinforcing a plan of care.  Today, my ACP, Sophia CINTRON, was here to observe my evaluation and management services for this condition to be followed going forward.\par \par \par \par \par

## 2021-11-08 ENCOUNTER — LABORATORY RESULT (OUTPATIENT)
Age: 66
End: 2021-11-08

## 2021-11-08 NOTE — DISCHARGE NOTE NURSING/CASE MANAGEMENT/SOCIAL WORK - NSDCVIVACCINE_GEN_ALL_CORE_FT
Received request via: Patient    Was the patient seen in the last year in this department? Yes    Does the patient have an active prescription (recently filled or refills available) for medication(s) requested? No   No Vaccines Administered.

## 2021-11-09 ENCOUNTER — APPOINTMENT (OUTPATIENT)
Dept: INFECTIOUS DISEASE | Facility: CLINIC | Age: 66
End: 2021-11-09

## 2021-11-10 DIAGNOSIS — E11.65 TYPE 2 DIABETES MELLITUS WITH UNSPECIFIED COMPLICATIONS: ICD-10-CM

## 2021-11-10 DIAGNOSIS — E11.8 TYPE 2 DIABETES MELLITUS WITH UNSPECIFIED COMPLICATIONS: ICD-10-CM

## 2021-11-10 LAB
ALBUMIN SERPL ELPH-MCNC: 4.1 G/DL
ALP BLD-CCNC: 101 U/L
ALT SERPL-CCNC: 16 U/L
ANION GAP SERPL CALC-SCNC: 17 MMOL/L
AST SERPL-CCNC: 14 U/L
BASOPHILS # BLD AUTO: 0.36 K/UL
BASOPHILS NFR BLD AUTO: 4 %
BILIRUB SERPL-MCNC: 0.7 MG/DL
BUN SERPL-MCNC: 31 MG/DL
CALCIUM SERPL-MCNC: 9.4 MG/DL
CHLORIDE SERPL-SCNC: 101 MMOL/L
CHOLEST SERPL-MCNC: 117 MG/DL
CO2 SERPL-SCNC: 20 MMOL/L
CREAT SERPL-MCNC: 0.84 MG/DL
EOSINOPHIL # BLD AUTO: 0.27 K/UL
EOSINOPHIL NFR BLD AUTO: 3 %
FRUCTOSAMINE SERPL-MCNC: 340 UMOL/L
GLUCOSE SERPL-MCNC: 190 MG/DL
HCT VFR BLD CALC: 39.6 %
HDLC SERPL-MCNC: 45 MG/DL
HGB BLD-MCNC: 10.6 G/DL
LDLC SERPL CALC-MCNC: 57 MG/DL
LYMPHOCYTES # BLD AUTO: 2.15 K/UL
LYMPHOCYTES NFR BLD AUTO: 24 %
MAN DIFF?: NORMAL
MCHC RBC-ENTMCNC: 23 PG
MCHC RBC-ENTMCNC: 26.8 GM/DL
MCV RBC AUTO: 86.1 FL
MONOCYTES # BLD AUTO: 0.45 K/UL
MONOCYTES NFR BLD AUTO: 5 %
NEUTROPHILS # BLD AUTO: 5.74 K/UL
NEUTROPHILS NFR BLD AUTO: 64 %
NONHDLC SERPL-MCNC: 72 MG/DL
PLATELET # BLD AUTO: 326 K/UL
POTASSIUM SERPL-SCNC: 5.6 MMOL/L
PROT SERPL-MCNC: 7.4 G/DL
RBC # BLD: 4.6 M/UL
RBC # FLD: 25.2 %
SODIUM SERPL-SCNC: 137 MMOL/L
T4 FREE SERPL-MCNC: 0.9 NG/DL
TRIGL SERPL-MCNC: 75 MG/DL
TSH SERPL-ACNC: 8.08 UIU/ML
WBC # FLD AUTO: 8.97 K/UL

## 2021-11-11 LAB
ESTIMATED AVERAGE GLUCOSE: 214 MG/DL
HBA1C MFR BLD HPLC: 9.1 %

## 2021-11-11 NOTE — PATIENT PROFILE ADULT - NSASFUNCLEVELADLAMBULATE_GEN_A_NUR
RGUJRAL 89yo f hx listed BIB family for abnormal labs. As per daughter patient was diagnosed with a UTI and currently taking cipro. Has had decreased intake and been more weak. No f/c. Complains of constipation and mild abd pain on palpation.   On exam, Patient is awake,alert,oriented x 3. Patient is well appearing and in no acute distress. Patient's chest is clear to ausculation, +s1s2. Abdomen is soft nd/mild diffuse tender +BS. Extremity with no swelling or calf tenderness.   Check labs, CT and UA to eval for electrolytes and infection. 3 = assistive equipment and person

## 2021-11-18 NOTE — ED ADULT NURSE NOTE - NS ED NURSE REPORT GIVEN TO FT
Patient Quality Outreach    Patient is due for the following:   Diabetes -  Diabetic Follow-Up Visit    NEXT STEPS:       Type of outreach:    Phone, left message for patient/parent to call back. please schedule pt for diabetic check       Questions for provider review:         Nichole Heredia CMA           RN

## 2021-11-19 ENCOUNTER — APPOINTMENT (OUTPATIENT)
Dept: INFECTIOUS DISEASE | Facility: CLINIC | Age: 66
End: 2021-11-19
Payer: COMMERCIAL

## 2021-11-19 DIAGNOSIS — T84.53XD INFECTION AND INFLAMMATORY REACTION DUE TO INTERNAL RIGHT KNEE PROSTHESIS, SUBSEQUENT ENCOUNTER: ICD-10-CM

## 2021-11-19 PROCEDURE — 99215 OFFICE O/P EST HI 40 MIN: CPT | Mod: 95

## 2021-11-19 NOTE — PHYSICAL EXAM
[General Appearance - Alert] : alert [General Appearance - In No Acute Distress] : in no acute distress [] : no respiratory distress [FreeTextEntry1] : R knee with several skin openings - he denies drainage.

## 2021-11-19 NOTE — ASSESSMENT
[FreeTextEntry1] : 67 yo M with HTN, hyperlipidemia, type II DM with peripheral neuropathy, CAD s/p MIDCAB (2018)/VERONICA, HFrEF, AICD (2/20), pulmonary HTN with recurrent R knee PPJI s/p arthroscopic I&D 7/17 and debridement with explantation and spacer placement 7/22.  MRSA grew in blood and from joint fluid.  Isolate had the same antibiogram as isolate in 11/2019, likely persistence. He was treated with 6 weeks of vancomycin and rifampin. On 9/4 he underwent right hip hardware exchange and open biopsy - no growth from cultures.  He completed vancomycin and rifampin on 10/30/20.  Knee remains painful and swollen.  Per ortho, no surgical intervention possible aside from amputation. He was also admitted with L knee septic arthritis following injection - off antibiotics since 7/2021. He also has draining cholecystocutaneous fistula. \par Plan:\par - No indication for antibiotic prophylaxis\par - F/u with Dr. King for fistula\par - F/u with ortho\par Follow up PRN.

## 2021-11-19 NOTE — HISTORY OF PRESENT ILLNESS
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 MRSA grew in blood and from joint fluid. He was treated with 6 weeks of vancomycin and rifampin. On 9/4/20 he underwent right hip hardware exchange and open biopsy - no growth from cultures. He completed vancomycin and rifampin on 10/30/20. On 11/16 he had R knee aspiration, 3500 WBC with 88% PMN, negative gram stain and cultures. He was last seen 2/12/21. At the time he had RLE swelling and drainage at graft site for which he was given doxycycline. He also had drainage from prior perc sudhir drain site, pending abdominal wall mass excision which had been delayed due to +COVID. He was admitted to [facility] 3/12 – 3/16 with involuntary movements of upper and lower extremities. He was seen by neurology who recommended LP however patient refused. EEG and MRI were negative.  On 4/8 he underwent excision of cholecystocutaneous fistula.  He was then admitted to [facility] 4/26 – 6/15 for Septic arthritis. He received steroid injection into L knee for pain. Two days after his knee began to swell to the point he could not walk. Arthrocentesis of L knee c/w bacterial septic infection s/p washout on 4/26. Admission blood cultures +MSSA. He was started on nafcillin.  On 5/27 he had arthroscopic drainage of L knee, antibiotic beads placed. He was treated with nafcillin 2 g IV q 4 h for 6 weeks from last debridement (5/27 – 7/7).  For biliary drainage, plan for outpatient ERCP with stent.  He still has R knee spacer in place. R knee is swollen. Small wounds have developed over knee, no drainage. He is unable to bear weight and is using wheelchair. Per patient, Dr. Vaughn only possible surigcal intervention would be amputation. He sought second opinion with Dr. Hathaway ([facility]) and Dr. Castro and was told the similar recommendations. He is also having ongoing L knee pain.  Cholecystocutaneous fistula wound never closed following excision and it continues to drain clear fluid.

## 2021-11-19 NOTE — REVIEW OF SYSTEMS
[Recent Weight Loss (___ Lbs)] : recent [unfilled] ~Ulb weight loss [As Noted in HPI] : as noted in HPI [Fever] : no fever [Chills] : no chills [Eye Pain] : no eye pain [Eyesight Problems] : no eyesight problems [Nasal Discharge] : no nasal discharge [Sore Throat] : no sore throat [Chest Pain] : no chest pain [Shortness Of Breath] : no shortness of breath [Cough] : no cough [Abdominal Pain] : no abdominal pain [Vomiting] : no vomiting [Dysuria] : no dysuria [Skin Lesions] : no skin lesions [Confused] : no confusion

## 2021-11-22 NOTE — REASON FOR VISIT
[Initial Evaluation] : an initial evaluation [DM Type 1] : DM Type 1 [FreeTextEntry2] : new to me used to follow with endocrinology at [facility]

## 2021-11-22 NOTE — ASSESSMENT
[Diabetes Foot Care] : diabetes foot care [Long Term Vascular Complications] : long term vascular complications of diabetes [Importance of Diet and Exercise] : importance of diet and exercise to improve glycemic control, achieve weight loss and improve cardiovascular health [Exercise/Effect on Glucose] : exercise/effect on glucose [Self Monitoring of Blood Glucose] : self monitoring of blood glucose [Retinopathy Screening] : Patient was referred to ophthalmology for retinopathy screening [Weight Loss] : weight loss [FreeTextEntry1] : 66 year old male with PMH of CAD s/p MI, PCI/CABG, CHFrEF (15-20%), has ICD, HTN, celiac disease, DM insulin dependant diabetes ,  neuropathy, chronic b/l LE ulcers presents, severe chronic pain, hx infected R TKR with MRSA (was eventually cemented so has limited ROM R knee), and history of cholecystostomy tube placement in February 2020 for acute cholecystitis s/p removal in May 2020 with multiple presentations including persistent bilious drainage from the prior tract site as well as a RUQ abdominal abscess at the site s/p drainage, septic arthritis in June, 2021 where they performed arthroscopic drainage who presented for evaluation of type 2 Diabetes:\par \par \par \par #insulin dependant, type 1 DM \par - no recent HBA1c but review of CGM freestyle storm with hypoglycemia in the middle of the night at least 3 events \par - decrease basal rate from 12 am to 6 am to 2.8 u/hr instead of 3 units /hr , keep 3 u/hr for rest \par - advised to start using bolus wizard I:c set at 1:10 g and ISF 1:40 will need to be adjusted based on control \par - patient is on insulin pump and need to check FS at least 5-6 times/day and adjust insulin dosage based on FS \par - opthalmology and podiatry referral \par - Continue statin \par - need DM educator \par - labs and f/u in 2- 3 months

## 2021-11-22 NOTE — HISTORY OF PRESENT ILLNESS
[Cathleen] : Cathleen [Hypoglycemia] : Patient is hypoglycemic. [FreeTextEntry1] : 66 year old male with PMH of CAD s/p MI, PCI/CABG, CHFrEF (15-20%), has ICD, HTN, celiac disease, insulin dependant DM  , neuropathy, chronic b/l LE ulcers presents, severe chronic pain, hx infected R TKR with MRSA (was eventually cemented so has limited ROM R knee), and history of cholecystostomy tube placement in February 2020 for acute cholecystitis s/p removal in May 2020 with multiple presentations including persistent bilious drainage from the prior tract site as well as a RUQ abdominal abscess at the site s/p drainage, septic arthritis in June, 2021 where they performed arthroscopic drainage who presented for evaluation of type 2 Diabetes:\par \par \par Diagnosis  >30 years \par Current Regimen:  insulin Humalog in pump \par Previous regimens: was on po meds but taken off , has been on insulin pump for 5-6 years now  \par Compliance: ok , NOT using bolus wizard \par SMBG/CGM :  has Freestyle storm 14 days \par Hypoglycemia:yes at least 3 events noted between 12 am and 6 am \par Polyuria/polydipsia : no \par Weight change/BMI: yes lost \par Diet: breakfast  dinner , occasional lunch \par Exercise: very limited due to patient recent medical problems with knee replacement and hip fracture , now in a wheelchair \par HBa1c trend: not available \par  \par \par prevention  \par Statin: yes rosuvastatin 40 mg \par ACE/ARB :\par Eye examination: yes \par Neuropathy: yes on cymbalta and gabapentin \par \par

## 2021-11-22 NOTE — PHYSICAL EXAM
[Alert] : alert [No Acute Distress] : no acute distress [No Proptosis] : no proptosis [No Lid Lag] : no lid lag [Thyroid Not Enlarged] : the thyroid was not enlarged [No Thyroid Nodules] : no palpable thyroid nodules [No Respiratory Distress] : no respiratory distress [No Accessory Muscle Use] : no accessory muscle use [No Murmurs] : no murmurs [Regular Rhythm] : with a regular rhythm [No Edema] : no peripheral edema [Soft] : abdomen soft [No Stigmata of Cushings Syndrome] : no stigmata of Cushings Syndrome [Abdominal Striae] : no abdominal striae [de-identified] : in a wheelchair  [de-identified] : significant muscle atrophy, no kne over right joint

## 2021-11-22 NOTE — REVIEW OF SYSTEMS
[Fatigue] : fatigue [Recent Weight Loss (___ Lbs)] : recent weight loss: [unfilled] lbs [Blurred Vision] : blurred vision [SOB on Exertion] : shortness of breath on exertion [Pain/Numbness of Digits] : pain/numbness of digits [Recent Weight Gain (___ Lbs)] : no recent weight gain [Fever] : no fever [Dysphagia] : no dysphagia [Neck Pain] : no neck pain [Dysphonia] : no dysphonia [Chest Pain] : no chest pain [Palpitations] : no palpitations [Fast Heart Rate] : heart rate is not fast [Shortness Of Breath] : no shortness of breath [Wheezing] : no wheezing [Nausea] : no nausea [Constipation] : no constipation [Vomiting] : no vomiting [Diarrhea] : no diarrhea [Muscle Weakness] : no muscle weakness [Acne] : no acne [Dizziness] : no dizziness [Cold Intolerance] : no cold intolerance

## 2021-11-23 ENCOUNTER — APPOINTMENT (OUTPATIENT)
Dept: INFECTIOUS DISEASE | Facility: CLINIC | Age: 66
End: 2021-11-23

## 2021-11-24 ENCOUNTER — RX RENEWAL (OUTPATIENT)
Age: 66
End: 2021-11-24

## 2021-11-30 NOTE — PATIENT PROFILE ADULT - NSPROMUTINFOINDIVIDFT_GEN_A_NUR
keep patient updated at all tines Melolabial Transposition Flap Text: The defect edges were debeveled with a #15 scalpel blade.  Given the location of the defect and the proximity to free margins a melolabial flap was deemed most appropriate.  Using a sterile surgical marker, an appropriate melolabial transposition flap was drawn incorporating the defect.    The area thus outlined was incised deep to adipose tissue with a #15 scalpel blade.  The skin margins were undermined to an appropriate distance in all directions utilizing iris scissors.

## 2021-12-13 ENCOUNTER — APPOINTMENT (OUTPATIENT)
Dept: NEUROLOGY | Facility: CLINIC | Age: 66
End: 2021-12-13
Payer: COMMERCIAL

## 2021-12-13 VITALS
HEIGHT: 73 IN | TEMPERATURE: 95.1 F | OXYGEN SATURATION: 98 % | HEART RATE: 60 BPM | BODY MASS INDEX: 23.19 KG/M2 | WEIGHT: 175 LBS | DIASTOLIC BLOOD PRESSURE: 61 MMHG | SYSTOLIC BLOOD PRESSURE: 107 MMHG

## 2021-12-13 DIAGNOSIS — R29.898 OTHER SYMPTOMS AND SIGNS INVOLVING THE MUSCULOSKELETAL SYSTEM: ICD-10-CM

## 2021-12-13 PROCEDURE — 99214 OFFICE O/P EST MOD 30 MIN: CPT

## 2021-12-13 RX ORDER — ALPRAZOLAM 0.5 MG/1
0.5 TABLET ORAL DAILY
Qty: 30 | Refills: 0 | Status: DISCONTINUED | COMMUNITY
Start: 2021-04-13 | End: 2021-12-13

## 2021-12-13 NOTE — REVIEW OF SYSTEMS
[Anxiety] : anxiety [Depression] : depression [Numbness] : numbness [Dizziness] : dizziness [Difficulty Walking] : difficulty walking [Limping] : not limping [Joint Pain] : joint pain [Joint Swelling] : joint swelling [Negative] : Heme/Lymph

## 2021-12-13 NOTE — ASSESSMENT
[FreeTextEntry1] :  66 year old man with history of poorly controlled diabetes, anemia, anxiety and depression, CAD, CHF, hyperlipidemia, PAD, colicystitis s/o biliary Drainage and osteoarthritis s/p knee surgery and complicated septic arthritis is here as a new patient concerned for jerky movements in the arms. 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 In addition he has significant atrophy and weakness in his intrinsic hand muscles and weakness. Recommend to investigation both possibly of brain and cervical spine etiology for myoclonus. His neuropathic symptoms seems to be controlled but he has significant weakness in his feet. Recommend to get EMG of both legs and one arm to assess to r/o other etiologies. \par \par - Routine EEG \par - MRI and C spine MRI w/o\par - EMG of both legs and one arm (90 min)\par - Low dose Valium 2 mg tablet qhs for myoclonus jerks.\par -RTC during the EMG visit

## 2021-12-13 NOTE — HISTORY OF PRESENT ILLNESS
[FreeTextEntry1] : FLOWER MARISCAL is a 66 year old man with history of poorly controlled diabetes, anemia, anxiety and depression, CAD, CHF, hyperlipidemia, PAD, colicystitis s/o biliary Drainage and osteoarthritis s/p knee surgery and complicated septic arthritis is here as a new patient to establish his care. He reports symptoms of numbness and paresthesia in his hands and feet for many years. He is on gabapentin and Cymbalta 30 mg BID which helps him with neuropathic pain. He has been using wheelchair due to knee arthritis and reduced knee range of motion.  He has long history of diabetes and is using insulin pump. Reports better control of blood sugar but his recent A1C level is 9. He has weakness in his hands. His main complaint today is random jerky movements in his arms which has been getting worse for past year. The movements would get worse when he is stressed. Denies LOC or seizure. Denies tremor or rigidity. He was prescribed Xanax which has been helping with the movements.

## 2021-12-13 NOTE — PHYSICAL EXAM
[FreeTextEntry1] : Mental status: Awake, alert and oriented x3.  Recent and remote memory intact.  Naming, repetition and comprehension intact.  Attention/concentration intact.  No dysarthria, no aphasia.  Fund of knowledge appropriate.  \par Cranial nerves: Pupils equally round and reactive to light, visual fields full, no nystagmus, extraocular muscles intact, V1 through V3 intact bilaterally and symmetric, face symmetric, hearing intact to finger rub, palate elevation symmetric, tongue was midline.\par Motor:  MRC grading, 4/5 in bilateral deltoid due to shoulder rotator cuff. Intact bilateral biceps and triceps. Bilateral APB: 2/5. Bilateral IO: 2/5. 4/5 finger extension. Significant atrophy in IOs and APB muscles.   Normal tone.  Mild kinetic tremor. No rigidity. Hip flexion: 5/5. Reduced Knees range of motion. \par Right ankle dorsiflexion: 3/5. Plantar flexion: 4/5 Inversion/eversion: 3/5\par Left ankle dorsiflexion: 4/5 plantar flexion, inversion and eversion: 4/5 .\par Sensation: Reduced PP and LT from mid leg. Absent vibration in the absent and toes\par Coordination: No dysmetria on finger-to-nose and heel-to-shin.  No dysdiadokinesia.\par Reflexes: 2+ in biceps. Absent triceps, patella and ankles. , downgoing toes bilaterally. (-) Montemayor.\par Gait: unsteady, side. \par \par

## 2021-12-27 NOTE — PATIENT PROFILE ADULT. - NSCAFFEINETYPE_GEN_ALL_CORE_SD
Tiffany called office and left voicemail that she called Dr. Steele, patients Cardiologist, and asked if Dr Steele would be the responsible provider with [facility] for patients INR monitoring and she said Dr. Steele said yes. Writer updated the episode to reflect this.   Zero or sugar free sodas/coffee/pop/soda

## 2022-01-01 ENCOUNTER — RX RENEWAL (OUTPATIENT)
Age: 67
End: 2022-01-01

## 2022-01-01 ENCOUNTER — APPOINTMENT (OUTPATIENT)
Dept: SLEEP CENTER | Facility: HOSPITAL | Age: 67
End: 2022-01-01

## 2022-01-01 ENCOUNTER — OUTPATIENT (OUTPATIENT)
Dept: OUTPATIENT SERVICES | Facility: HOSPITAL | Age: 67
LOS: 1 days | Discharge: HOME | End: 2022-01-01
Payer: MEDICARE

## 2022-01-01 DIAGNOSIS — Z43.4 ENCOUNTER FOR ATTENTION TO OTHER ARTIFICIAL OPENINGS OF DIGESTIVE TRACT: Chronic | ICD-10-CM

## 2022-01-01 DIAGNOSIS — Z95.1 PRESENCE OF AORTOCORONARY BYPASS GRAFT: Chronic | ICD-10-CM

## 2022-01-01 DIAGNOSIS — Z41.9 ENCOUNTER FOR PROCEDURE FOR PURPOSES OTHER THAN REMEDYING HEALTH STATE, UNSPECIFIED: Chronic | ICD-10-CM

## 2022-01-01 DIAGNOSIS — Z95.5 PRESENCE OF CORONARY ANGIOPLASTY IMPLANT AND GRAFT: Chronic | ICD-10-CM

## 2022-01-01 DIAGNOSIS — G47.33 OBSTRUCTIVE SLEEP APNEA (ADULT) (PEDIATRIC): ICD-10-CM

## 2022-01-01 DIAGNOSIS — Z98.890 OTHER SPECIFIED POSTPROCEDURAL STATES: Chronic | ICD-10-CM

## 2022-01-01 DIAGNOSIS — Z95.810 PRESENCE OF AUTOMATIC (IMPLANTABLE) CARDIAC DEFIBRILLATOR: Chronic | ICD-10-CM

## 2022-01-01 DIAGNOSIS — Z96.641 PRESENCE OF RIGHT ARTIFICIAL HIP JOINT: Chronic | ICD-10-CM

## 2022-01-01 DIAGNOSIS — Z96.651 PRESENCE OF RIGHT ARTIFICIAL KNEE JOINT: Chronic | ICD-10-CM

## 2022-01-01 DIAGNOSIS — Z90.89 ACQUIRED ABSENCE OF OTHER ORGANS: Chronic | ICD-10-CM

## 2022-01-01 PROCEDURE — 95810 POLYSOM 6/> YRS 4/> PARAM: CPT | Mod: 26

## 2022-01-01 NOTE — PROGRESS NOTE ADULT - PROBLEM/PLAN-6
[facility] ([facility])  History & Physical    Nursery    Patient Name: Avi Weinberg  MRN: [id number]  Admission Date: 2022    Subjective:     Chief Complaint/Reason for Admission:  Infant is a 1 days Girl Violet Weinberg born at 39w0d  Infant was born on 2020 at 11:38 AM via , Low Transverse.    No data found    Maternal History:  The mother is a 39 y.o.   . She  has a past medical history of Migraines.     Prenatal Labs Review:  ABO/Rh:   Lab Results   Component Value Date/Time    GROUPTRH O POS 2022 10:46 AM    GROUPTRH O POS 2021 10:52 AM    GROUPTRH O POS 2010 01:00 AM      Group B Beta Strep:   Lab Results   Component Value Date/Time    STREPBCULT No Group B Streptococcus isolated 2022 02:17 PM      HIV:   HIV 1/2 Ag/Ab   Date Value Ref Range Status   2022 Negative Negative Final        RPR:   Lab Results   Component Value Date/Time    RPR Non-reactive 2022 11:27 AM      Hepatitis B Surface Antigen:   Lab Results   Component Value Date/Time    HEPBSAG Negative 2021 10:52 AM      Rubella Immune Status:   Lab Results   Component Value Date/Time    RUBELLAIMMUN Reactive 2021 10:52 AM        Pregnancy/Delivery Course:  The pregnancy was complicated by pre-eclampsia in third trimester, anxiety, AMA, migraines , previous c -section. Prenatal ultrasound revealed normal anatomy. Prenatal care was good. Mother received no medications. Membrane rupture:  Membrane Rupture Date 1: 22   Membrane Rupture Time 1: 1137 .  The delivery was complicated by loose nuchal cord x 1. Apgar scores: )  [place] Assessment:     1 Minute:  Skin color:    Muscle tone:    Heart rate:    Breathing:    Grimace:    Total: 9          5 Minute:  Skin color:    Muscle tone:    Heart rate:    Breathing:    Grimace:    Total: 9          10 Minute:  Skin color:    Muscle tone:    Heart rate:    Breathing:    Grimace:    Total:          Living  "Status:      .      Review of Systems   Constitutional: Negative for activity change, appetite change, decreased responsiveness and fever.   HENT: Negative for congestion and rhinorrhea.    Eyes: Negative for discharge and redness.   Respiratory: Negative for cough, choking and stridor.    Cardiovascular: Negative for fatigue with feeds and cyanosis.   Gastrointestinal: Negative for abdominal distention, blood in stool, constipation, diarrhea and vomiting.   Genitourinary: Negative for decreased urine volume.   Musculoskeletal: Negative for extremity weakness.   Skin: Negative for color change, pallor and rash.   Neurological: Negative for facial asymmetry.       Objective:     Vital Signs (Most Recent)  Temp: 98.3 °F (36.8 °C) (07/08/22 0800)  Pulse: 144 (07/08/22 0810)  Resp: 48 (07/08/22 0810)    Most Recent Weight: 3235 g (7 lb 2.1 oz) (07/07/22 2200)  Admission Weight: 3240 g (7 lb 2.3 oz) (Filed from Delivery Summary) (07/07/22 1138)  Admission  Head Circumference: 36 cm (Filed from Delivery Summary)   Admission Length: Height: 52.1 cm (20.5") (Filed from Delivery Summary)    Physical Exam  Constitutional:       General: She is active. She has a strong cry. She is not in acute distress.     Comments: No dysmorphic features.   HENT:      Head: Normocephalic. Anterior fontanelle is flat.      Right Ear: External ear normal.      Left Ear: External ear normal.      Nose: Nose normal.      Mouth/Throat:      Lips: Pink.      Mouth: Mucous membranes are moist.      Pharynx: No cleft palate.   Eyes:      General: Red reflex is present bilaterally. No scleral icterus.        Right eye: No discharge.         Left eye: No discharge.      Conjunctiva/sclera: Conjunctivae normal.   Cardiovascular:      Rate and Rhythm: Normal rate and regular rhythm.      Pulses: Pulses are strong.           Femoral pulses are 2+ on the right side and 2+ on the left side.     Heart sounds: S1 normal and S2 normal. No murmur " heard.  Pulmonary:      Effort: Pulmonary effort is normal. No respiratory distress, nasal flaring or retractions.      Breath sounds: Normal breath sounds.   Chest:      Chest wall: No deformity.   Abdominal:      General: The umbilical stump is clean. Bowel sounds are normal. There is no distension.      Palpations: Abdomen is soft. There is no hepatomegaly, splenomegaly or mass.   Genitourinary:     Comments: Normal female genitalia. Anus patent.  Musculoskeletal:         General: No deformity. Normal range of motion.      Cervical back: Normal range of motion.      Comments: No hip clicks or clunks.  Intact clavicles.  Sacral dimple: No.   Skin:     General: Skin is warm and moist.      Coloration: Skin is not jaundiced or pale.      Findings: No rash.   Neurological:      General: No focal deficit present.      Mental Status: She is alert.      Cranial Nerves: No facial asymmetry.      Motor: No weakness or abnormal muscle tone.      Primitive Reflexes: Suck and root normal. Symmetric Ana.       Recent Results (from the past 168 hour(s))   Cord blood evaluation    Collection Time: 22 12:59 PM   Result Value Ref Range    Cord ABO O     Cord Rh POS     Cord Direct Ilana NEG        Assessment and Plan:     Admission Diagnoses:   Active Hospital Problems    Diagnosis  POA    *Single liveborn infant, delivered by  [Z38.01]  Yes     AGA female  P: Routine  care        Resolved Hospital Problems   No resolved problems to display.       Susana Redd MD  Pediatrics  [facility] ([facility])   DISPLAY PLAN FREE TEXT

## 2022-01-04 ENCOUNTER — INPATIENT (INPATIENT)
Facility: HOSPITAL | Age: 67
LOS: 1 days | Discharge: ANOTHER HOSPITAL | End: 2022-01-06
Attending: INTERNAL MEDICINE | Admitting: INTERNAL MEDICINE
Payer: COMMERCIAL

## 2022-01-04 VITALS
HEART RATE: 76 BPM | DIASTOLIC BLOOD PRESSURE: 51 MMHG | TEMPERATURE: 97 F | OXYGEN SATURATION: 96 % | WEIGHT: 259.93 LBS | RESPIRATION RATE: 14 BRPM | HEIGHT: 73 IN | SYSTOLIC BLOOD PRESSURE: 102 MMHG

## 2022-01-04 DIAGNOSIS — Z96.641 PRESENCE OF RIGHT ARTIFICIAL HIP JOINT: Chronic | ICD-10-CM

## 2022-01-04 DIAGNOSIS — Z95.1 PRESENCE OF AORTOCORONARY BYPASS GRAFT: Chronic | ICD-10-CM

## 2022-01-04 DIAGNOSIS — Z43.4 ENCOUNTER FOR ATTENTION TO OTHER ARTIFICIAL OPENINGS OF DIGESTIVE TRACT: Chronic | ICD-10-CM

## 2022-01-04 DIAGNOSIS — Z41.9 ENCOUNTER FOR PROCEDURE FOR PURPOSES OTHER THAN REMEDYING HEALTH STATE, UNSPECIFIED: Chronic | ICD-10-CM

## 2022-01-04 DIAGNOSIS — Z95.810 PRESENCE OF AUTOMATIC (IMPLANTABLE) CARDIAC DEFIBRILLATOR: Chronic | ICD-10-CM

## 2022-01-04 DIAGNOSIS — Z96.651 PRESENCE OF RIGHT ARTIFICIAL KNEE JOINT: Chronic | ICD-10-CM

## 2022-01-04 DIAGNOSIS — Z98.890 OTHER SPECIFIED POSTPROCEDURAL STATES: Chronic | ICD-10-CM

## 2022-01-04 DIAGNOSIS — Z95.5 PRESENCE OF CORONARY ANGIOPLASTY IMPLANT AND GRAFT: Chronic | ICD-10-CM

## 2022-01-04 DIAGNOSIS — Z90.89 ACQUIRED ABSENCE OF OTHER ORGANS: Chronic | ICD-10-CM

## 2022-01-04 PROCEDURE — 93970 EXTREMITY STUDY: CPT | Mod: 26

## 2022-01-04 PROCEDURE — 99285 EMERGENCY DEPT VISIT HI MDM: CPT

## 2022-01-05 ENCOUNTER — TRANSCRIPTION ENCOUNTER (OUTPATIENT)
Age: 67
End: 2022-01-05

## 2022-01-05 VITALS
HEART RATE: 95 BPM | DIASTOLIC BLOOD PRESSURE: 55 MMHG | RESPIRATION RATE: 18 BRPM | SYSTOLIC BLOOD PRESSURE: 124 MMHG | TEMPERATURE: 97 F

## 2022-01-05 LAB
ALBUMIN SERPL ELPH-MCNC: 3.4 G/DL — LOW (ref 3.5–5.2)
ALBUMIN SERPL ELPH-MCNC: 3.4 G/DL — LOW (ref 3.5–5.2)
ALP SERPL-CCNC: 119 U/L — HIGH (ref 30–115)
ALP SERPL-CCNC: 132 U/L — HIGH (ref 30–115)
ALT FLD-CCNC: 25 U/L — SIGNIFICANT CHANGE UP (ref 0–41)
ALT FLD-CCNC: 27 U/L — SIGNIFICANT CHANGE UP (ref 0–41)
AMPHET UR-MCNC: NEGATIVE — SIGNIFICANT CHANGE UP
ANION GAP SERPL CALC-SCNC: 15 MMOL/L — HIGH (ref 7–14)
ANION GAP SERPL CALC-SCNC: 20 MMOL/L — HIGH (ref 7–14)
ANION GAP SERPL CALC-SCNC: 21 MMOL/L — HIGH (ref 7–14)
APPEARANCE UR: CLEAR — SIGNIFICANT CHANGE UP
APTT BLD: 28 SEC — SIGNIFICANT CHANGE UP (ref 27–39.2)
AST SERPL-CCNC: 26 U/L — SIGNIFICANT CHANGE UP (ref 0–41)
AST SERPL-CCNC: 41 U/L — SIGNIFICANT CHANGE UP (ref 0–41)
B PERT IGG+IGM PNL SER: ABNORMAL
BACTERIA # UR AUTO: NEGATIVE — SIGNIFICANT CHANGE UP
BARBITURATES UR SCN-MCNC: NEGATIVE — SIGNIFICANT CHANGE UP
BASOPHILS # BLD AUTO: 0.03 K/UL — SIGNIFICANT CHANGE UP (ref 0–0.2)
BASOPHILS NFR BLD AUTO: 0.2 % — SIGNIFICANT CHANGE UP (ref 0–1)
BENZODIAZ UR-MCNC: NEGATIVE — SIGNIFICANT CHANGE UP
BILIRUB SERPL-MCNC: 0.6 MG/DL — SIGNIFICANT CHANGE UP (ref 0.2–1.2)
BILIRUB SERPL-MCNC: 0.8 MG/DL — SIGNIFICANT CHANGE UP (ref 0.2–1.2)
BILIRUB UR-MCNC: NEGATIVE — SIGNIFICANT CHANGE UP
BLD GP AB SCN SERPL QL: SIGNIFICANT CHANGE UP
BUN SERPL-MCNC: 29 MG/DL — HIGH (ref 10–20)
BUN SERPL-MCNC: 37 MG/DL — HIGH (ref 10–20)
BUN SERPL-MCNC: 37 MG/DL — HIGH (ref 10–20)
CALCIUM SERPL-MCNC: 9.1 MG/DL — SIGNIFICANT CHANGE UP (ref 8.5–10.1)
CALCIUM SERPL-MCNC: 9.5 MG/DL — SIGNIFICANT CHANGE UP (ref 8.5–10.1)
CALCIUM SERPL-MCNC: 9.6 MG/DL — SIGNIFICANT CHANGE UP (ref 8.5–10.1)
CHLORIDE SERPL-SCNC: 91 MMOL/L — LOW (ref 98–110)
CHLORIDE SERPL-SCNC: 92 MMOL/L — LOW (ref 98–110)
CHLORIDE SERPL-SCNC: 96 MMOL/L — LOW (ref 98–110)
CO2 SERPL-SCNC: 15 MMOL/L — LOW (ref 17–32)
CO2 SERPL-SCNC: 20 MMOL/L — SIGNIFICANT CHANGE UP (ref 17–32)
CO2 SERPL-SCNC: 22 MMOL/L — SIGNIFICANT CHANGE UP (ref 17–32)
COCAINE METAB.OTHER UR-MCNC: NEGATIVE — SIGNIFICANT CHANGE UP
COLOR FLD: SIGNIFICANT CHANGE UP
COLOR SPEC: SIGNIFICANT CHANGE UP
CREAT SERPL-MCNC: 1.2 MG/DL — SIGNIFICANT CHANGE UP (ref 0.7–1.5)
CREAT SERPL-MCNC: 1.3 MG/DL — SIGNIFICANT CHANGE UP (ref 0.7–1.5)
CREAT SERPL-MCNC: 1.3 MG/DL — SIGNIFICANT CHANGE UP (ref 0.7–1.5)
CRP SERPL-MCNC: 334 MG/L — HIGH
DIFF PNL FLD: NEGATIVE — SIGNIFICANT CHANGE UP
DRUG SCREEN 1, URINE RESULT: SIGNIFICANT CHANGE UP
EOSINOPHIL # BLD AUTO: 0.18 K/UL — SIGNIFICANT CHANGE UP (ref 0–0.7)
EOSINOPHIL NFR BLD AUTO: 1.4 % — SIGNIFICANT CHANGE UP (ref 0–8)
EPI CELLS # UR: 0 /HPF — SIGNIFICANT CHANGE UP (ref 0–5)
ERYTHROCYTE [SEDIMENTATION RATE] IN BLOOD: 76 MM/HR — HIGH (ref 0–10)
FLUID INTAKE SUBSTANCE CLASS: SIGNIFICANT CHANGE UP
FLUID SEGMENTED GRANULOCYTES: 95 % — SIGNIFICANT CHANGE UP
GLUCOSE BLDC GLUCOMTR-MCNC: 205 MG/DL — HIGH (ref 70–99)
GLUCOSE BLDC GLUCOMTR-MCNC: 225 MG/DL — HIGH (ref 70–99)
GLUCOSE BLDC GLUCOMTR-MCNC: 252 MG/DL — HIGH (ref 70–99)
GLUCOSE BLDC GLUCOMTR-MCNC: 394 MG/DL — HIGH (ref 70–99)
GLUCOSE SERPL-MCNC: 179 MG/DL — HIGH (ref 70–99)
GLUCOSE SERPL-MCNC: 237 MG/DL — HIGH (ref 70–99)
GLUCOSE SERPL-MCNC: 303 MG/DL — HIGH (ref 70–99)
GLUCOSE UR QL: ABNORMAL
GRAM STN FLD: SIGNIFICANT CHANGE UP
GRAM STN FLD: SIGNIFICANT CHANGE UP
HCT VFR BLD CALC: 37.2 % — LOW (ref 42–52)
HCT VFR BLD CALC: 37.9 % — LOW (ref 42–52)
HGB BLD-MCNC: 11.5 G/DL — LOW (ref 14–18)
HGB BLD-MCNC: 11.5 G/DL — LOW (ref 14–18)
HYALINE CASTS # UR AUTO: 0 /LPF — SIGNIFICANT CHANGE UP (ref 0–7)
IMM GRANULOCYTES NFR BLD AUTO: 0.3 % — SIGNIFICANT CHANGE UP (ref 0.1–0.3)
INR BLD: 1.19 RATIO — SIGNIFICANT CHANGE UP (ref 0.65–1.3)
KETONES UR-MCNC: SIGNIFICANT CHANGE UP
LACTATE SERPL-SCNC: 1.1 MMOL/L — SIGNIFICANT CHANGE UP (ref 0.7–2)
LEUKOCYTE ESTERASE UR-ACNC: NEGATIVE — SIGNIFICANT CHANGE UP
LYMPHOCYTES # BLD AUTO: 0.51 K/UL — LOW (ref 1.2–3.4)
LYMPHOCYTES # BLD AUTO: 4 % — LOW (ref 20.5–51.1)
LYMPHOCYTES # FLD: 1 — SIGNIFICANT CHANGE UP
MAGNESIUM SERPL-MCNC: 2.1 MG/DL — SIGNIFICANT CHANGE UP (ref 1.8–2.4)
MCHC RBC-ENTMCNC: 24 PG — LOW (ref 27–31)
MCHC RBC-ENTMCNC: 24.3 PG — LOW (ref 27–31)
MCHC RBC-ENTMCNC: 30.3 G/DL — LOW (ref 32–37)
MCHC RBC-ENTMCNC: 30.9 G/DL — LOW (ref 32–37)
MCV RBC AUTO: 78.6 FL — LOW (ref 80–94)
MCV RBC AUTO: 79.1 FL — LOW (ref 80–94)
METHADONE UR-MCNC: NEGATIVE — SIGNIFICANT CHANGE UP
MONOCYTES # BLD AUTO: 1.44 K/UL — HIGH (ref 0.1–0.6)
MONOCYTES NFR BLD AUTO: 11.2 % — HIGH (ref 1.7–9.3)
MONOS+MACROS # FLD: 4 % — SIGNIFICANT CHANGE UP
NEUTROPHILS # BLD AUTO: 10.69 K/UL — HIGH (ref 1.4–6.5)
NEUTROPHILS NFR BLD AUTO: 82.9 % — HIGH (ref 42.2–75.2)
NITRITE UR-MCNC: NEGATIVE — SIGNIFICANT CHANGE UP
NRBC # BLD: 0 /100 WBCS — SIGNIFICANT CHANGE UP (ref 0–0)
NRBC # BLD: 0 /100 WBCS — SIGNIFICANT CHANGE UP (ref 0–0)
OPIATES UR-MCNC: POSITIVE
PCP UR-MCNC: NEGATIVE — SIGNIFICANT CHANGE UP
PH UR: 6 — SIGNIFICANT CHANGE UP (ref 5–8)
PHOSPHATE SERPL-MCNC: 4.2 MG/DL — SIGNIFICANT CHANGE UP (ref 2.1–4.9)
PLATELET # BLD AUTO: 206 K/UL — SIGNIFICANT CHANGE UP (ref 130–400)
PLATELET # BLD AUTO: 216 K/UL — SIGNIFICANT CHANGE UP (ref 130–400)
POTASSIUM SERPL-MCNC: 5.5 MMOL/L — HIGH (ref 3.5–5)
POTASSIUM SERPL-MCNC: 5.5 MMOL/L — HIGH (ref 3.5–5)
POTASSIUM SERPL-MCNC: 5.7 MMOL/L — HIGH (ref 3.5–5)
POTASSIUM SERPL-SCNC: 5.5 MMOL/L — HIGH (ref 3.5–5)
POTASSIUM SERPL-SCNC: 5.5 MMOL/L — HIGH (ref 3.5–5)
POTASSIUM SERPL-SCNC: 5.7 MMOL/L — HIGH (ref 3.5–5)
PROPOXYPHENE QUALITATIVE URINE RESULT: NEGATIVE — SIGNIFICANT CHANGE UP
PROT SERPL-MCNC: 6.8 G/DL — SIGNIFICANT CHANGE UP (ref 6–8)
PROT SERPL-MCNC: 7.1 G/DL — SIGNIFICANT CHANGE UP (ref 6–8)
PROT UR-MCNC: ABNORMAL
PROTHROM AB SERPL-ACNC: 13.7 SEC — HIGH (ref 9.95–12.87)
RBC # BLD: 4.73 M/UL — SIGNIFICANT CHANGE UP (ref 4.7–6.1)
RBC # BLD: 4.79 M/UL — SIGNIFICANT CHANGE UP (ref 4.7–6.1)
RBC # FLD: 17.3 % — HIGH (ref 11.5–14.5)
RBC # FLD: 17.3 % — HIGH (ref 11.5–14.5)
RBC CASTS # UR COMP ASSIST: 2 /HPF — SIGNIFICANT CHANGE UP (ref 0–4)
RCV VOL RI: HIGH /UL (ref 0–0)
SARS-COV-2 RNA SPEC QL NAA+PROBE: SIGNIFICANT CHANGE UP
SODIUM SERPL-SCNC: 128 MMOL/L — LOW (ref 135–146)
SODIUM SERPL-SCNC: 131 MMOL/L — LOW (ref 135–146)
SODIUM SERPL-SCNC: 133 MMOL/L — LOW (ref 135–146)
SP GR SPEC: 1.03 — HIGH (ref 1.01–1.03)
SPECIMEN SOURCE: SIGNIFICANT CHANGE UP
SYNOVIAL CRYSTALS CLARITY: ABNORMAL
SYNOVIAL CRYSTALS COLOR: SIGNIFICANT CHANGE UP
SYNOVIAL CRYSTALS ID: SIGNIFICANT CHANGE UP
SYNOVIAL CRYSTALS TUBE: SIGNIFICANT CHANGE UP
THC UR QL: NEGATIVE — SIGNIFICANT CHANGE UP
TOTAL NUCLEATED CELL COUNT, BODY FLUID: SIGNIFICANT CHANGE UP /UL
TUBE TYPE: SIGNIFICANT CHANGE UP
UROBILINOGEN FLD QL: SIGNIFICANT CHANGE UP
WBC # BLD: 12.89 K/UL — HIGH (ref 4.8–10.8)
WBC # BLD: 14 K/UL — HIGH (ref 4.8–10.8)
WBC # FLD AUTO: 12.89 K/UL — HIGH (ref 4.8–10.8)
WBC # FLD AUTO: 14 K/UL — HIGH (ref 4.8–10.8)
WBC UR QL: 2 /HPF — SIGNIFICANT CHANGE UP (ref 0–5)

## 2022-01-05 PROCEDURE — 73552 X-RAY EXAM OF FEMUR 2/>: CPT | Mod: 26,RT

## 2022-01-05 PROCEDURE — 71045 X-RAY EXAM CHEST 1 VIEW: CPT | Mod: 26,77

## 2022-01-05 PROCEDURE — 93010 ELECTROCARDIOGRAM REPORT: CPT

## 2022-01-05 PROCEDURE — 99233 SBSQ HOSP IP/OBS HIGH 50: CPT

## 2022-01-05 PROCEDURE — 73562 X-RAY EXAM OF KNEE 3: CPT | Mod: 26,RT

## 2022-01-05 PROCEDURE — 73590 X-RAY EXAM OF LOWER LEG: CPT | Mod: 26,RT

## 2022-01-05 PROCEDURE — 71045 X-RAY EXAM CHEST 1 VIEW: CPT | Mod: 26

## 2022-01-05 RX ORDER — DEXTROSE 50 % IN WATER 50 %
25 SYRINGE (ML) INTRAVENOUS ONCE
Refills: 0 | Status: DISCONTINUED | OUTPATIENT
Start: 2022-01-05 | End: 2022-01-06

## 2022-01-05 RX ORDER — DEXTROSE 50 % IN WATER 50 %
15 SYRINGE (ML) INTRAVENOUS ONCE
Refills: 0 | Status: DISCONTINUED | OUTPATIENT
Start: 2022-01-05 | End: 2022-01-06

## 2022-01-05 RX ORDER — VANCOMYCIN HCL 1 G
1500 VIAL (EA) INTRAVENOUS EVERY 12 HOURS
Refills: 0 | Status: DISCONTINUED | OUTPATIENT
Start: 2022-01-05 | End: 2022-01-06

## 2022-01-05 RX ORDER — DULOXETINE HYDROCHLORIDE 30 MG/1
1 CAPSULE, DELAYED RELEASE ORAL
Qty: 0 | Refills: 0 | DISCHARGE

## 2022-01-05 RX ORDER — HYDROMORPHONE HYDROCHLORIDE 2 MG/ML
1 INJECTION INTRAMUSCULAR; INTRAVENOUS; SUBCUTANEOUS ONCE
Refills: 0 | Status: DISCONTINUED | OUTPATIENT
Start: 2022-01-05 | End: 2022-01-05

## 2022-01-05 RX ORDER — MORPHINE SULFATE 50 MG/1
4 CAPSULE, EXTENDED RELEASE ORAL ONCE
Refills: 0 | Status: DISCONTINUED | OUTPATIENT
Start: 2022-01-05 | End: 2022-01-05

## 2022-01-05 RX ORDER — ONDANSETRON 8 MG/1
4 TABLET, FILM COATED ORAL EVERY 8 HOURS
Refills: 0 | Status: DISCONTINUED | OUTPATIENT
Start: 2022-01-05 | End: 2022-01-06

## 2022-01-05 RX ORDER — LEVOTHYROXINE SODIUM 125 MCG
25 TABLET ORAL DAILY
Refills: 0 | Status: DISCONTINUED | OUTPATIENT
Start: 2022-01-05 | End: 2022-01-06

## 2022-01-05 RX ORDER — EMPAGLIFLOZIN 10 MG/1
1 TABLET, FILM COATED ORAL
Qty: 0 | Refills: 0 | DISCHARGE

## 2022-01-05 RX ORDER — ATORVASTATIN CALCIUM 80 MG/1
40 TABLET, FILM COATED ORAL AT BEDTIME
Refills: 0 | Status: DISCONTINUED | OUTPATIENT
Start: 2022-01-05 | End: 2022-01-06

## 2022-01-05 RX ORDER — ACETAMINOPHEN 500 MG
650 TABLET ORAL EVERY 6 HOURS
Refills: 0 | Status: DISCONTINUED | OUTPATIENT
Start: 2022-01-05 | End: 2022-01-06

## 2022-01-05 RX ORDER — FENTANYL CITRATE 50 UG/ML
1 INJECTION INTRAVENOUS
Qty: 0 | Refills: 0 | DISCHARGE

## 2022-01-05 RX ORDER — INSULIN LISPRO 100/ML
VIAL (ML) SUBCUTANEOUS
Refills: 0 | Status: DISCONTINUED | OUTPATIENT
Start: 2022-01-05 | End: 2022-01-06

## 2022-01-05 RX ORDER — PIPERACILLIN AND TAZOBACTAM 4; .5 G/20ML; G/20ML
3.38 INJECTION, POWDER, LYOPHILIZED, FOR SOLUTION INTRAVENOUS ONCE
Refills: 0 | Status: COMPLETED | OUTPATIENT
Start: 2022-01-05 | End: 2022-01-05

## 2022-01-05 RX ORDER — IBUPROFEN 200 MG
600 TABLET ORAL ONCE
Refills: 0 | Status: COMPLETED | OUTPATIENT
Start: 2022-01-05 | End: 2022-01-05

## 2022-01-05 RX ORDER — VANCOMYCIN HCL 1 G
1.5 VIAL (EA) INTRAVENOUS
Qty: 0 | Refills: 0 | DISCHARGE
Start: 2022-01-05

## 2022-01-05 RX ORDER — CEFEPIME 1 G/1
2000 INJECTION, POWDER, FOR SOLUTION INTRAMUSCULAR; INTRAVENOUS EVERY 12 HOURS
Refills: 0 | Status: DISCONTINUED | OUTPATIENT
Start: 2022-01-05 | End: 2022-01-05

## 2022-01-05 RX ORDER — ALPRAZOLAM 0.25 MG
0.5 TABLET ORAL EVERY 8 HOURS
Refills: 0 | Status: DISCONTINUED | OUTPATIENT
Start: 2022-01-05 | End: 2022-01-06

## 2022-01-05 RX ORDER — INSULIN GLARGINE 100 [IU]/ML
20 INJECTION, SOLUTION SUBCUTANEOUS AT BEDTIME
Refills: 0 | Status: DISCONTINUED | OUTPATIENT
Start: 2022-01-05 | End: 2022-01-06

## 2022-01-05 RX ORDER — SPIRONOLACTONE 25 MG/1
25 TABLET, FILM COATED ORAL DAILY
Refills: 0 | Status: DISCONTINUED | OUTPATIENT
Start: 2022-01-05 | End: 2022-01-06

## 2022-01-05 RX ORDER — MORPHINE SULFATE 50 MG/1
2 CAPSULE, EXTENDED RELEASE ORAL
Qty: 0 | Refills: 0 | DISCHARGE
Start: 2022-01-05

## 2022-01-05 RX ORDER — ASPIRIN/CALCIUM CARB/MAGNESIUM 324 MG
81 TABLET ORAL DAILY
Refills: 0 | Status: DISCONTINUED | OUTPATIENT
Start: 2022-01-05 | End: 2022-01-06

## 2022-01-05 RX ORDER — SODIUM CHLORIDE 9 MG/ML
1000 INJECTION, SOLUTION INTRAVENOUS
Refills: 0 | Status: DISCONTINUED | OUTPATIENT
Start: 2022-01-05 | End: 2022-01-06

## 2022-01-05 RX ORDER — DEXTROSE 50 % IN WATER 50 %
50 SYRINGE (ML) INTRAVENOUS ONCE
Refills: 0 | Status: COMPLETED | OUTPATIENT
Start: 2022-01-05 | End: 2022-01-05

## 2022-01-05 RX ORDER — GLUCAGON INJECTION, SOLUTION 0.5 MG/.1ML
1 INJECTION, SOLUTION SUBCUTANEOUS ONCE
Refills: 0 | Status: DISCONTINUED | OUTPATIENT
Start: 2022-01-05 | End: 2022-01-06

## 2022-01-05 RX ORDER — INSULIN HUMAN 100 [IU]/ML
10 INJECTION, SOLUTION SUBCUTANEOUS ONCE
Refills: 0 | Status: COMPLETED | OUTPATIENT
Start: 2022-01-05 | End: 2022-01-05

## 2022-01-05 RX ORDER — MORPHINE SULFATE 50 MG/1
2 CAPSULE, EXTENDED RELEASE ORAL EVERY 4 HOURS
Refills: 0 | Status: DISCONTINUED | OUTPATIENT
Start: 2022-01-05 | End: 2022-01-06

## 2022-01-05 RX ORDER — VANCOMYCIN HCL 1 G
1000 VIAL (EA) INTRAVENOUS ONCE
Refills: 0 | Status: COMPLETED | OUTPATIENT
Start: 2022-01-05 | End: 2022-01-05

## 2022-01-05 RX ORDER — LANOLIN ALCOHOL/MO/W.PET/CERES
3 CREAM (GRAM) TOPICAL AT BEDTIME
Refills: 0 | Status: DISCONTINUED | OUTPATIENT
Start: 2022-01-05 | End: 2022-01-06

## 2022-01-05 RX ORDER — SODIUM ZIRCONIUM CYCLOSILICATE 10 G/10G
10 POWDER, FOR SUSPENSION ORAL
Refills: 0 | Status: DISCONTINUED | OUTPATIENT
Start: 2022-01-05 | End: 2022-01-06

## 2022-01-05 RX ORDER — LEVOTHYROXINE SODIUM 125 MCG
1 TABLET ORAL
Qty: 0 | Refills: 0 | DISCHARGE

## 2022-01-05 RX ORDER — DULOXETINE HYDROCHLORIDE 30 MG/1
30 CAPSULE, DELAYED RELEASE ORAL DAILY
Refills: 0 | Status: DISCONTINUED | OUTPATIENT
Start: 2022-01-05 | End: 2022-01-06

## 2022-01-05 RX ORDER — PANTOPRAZOLE SODIUM 20 MG/1
40 TABLET, DELAYED RELEASE ORAL
Refills: 0 | Status: DISCONTINUED | OUTPATIENT
Start: 2022-01-05 | End: 2022-01-06

## 2022-01-05 RX ORDER — PRASUGREL 5 MG/1
1 TABLET, FILM COATED ORAL
Qty: 0 | Refills: 0 | DISCHARGE

## 2022-01-05 RX ORDER — METRONIDAZOLE 500 MG
500 TABLET ORAL EVERY 8 HOURS
Refills: 0 | Status: DISCONTINUED | OUTPATIENT
Start: 2022-01-05 | End: 2022-01-05

## 2022-01-05 RX ORDER — DEXTROSE 50 % IN WATER 50 %
12.5 SYRINGE (ML) INTRAVENOUS ONCE
Refills: 0 | Status: DISCONTINUED | OUTPATIENT
Start: 2022-01-05 | End: 2022-01-06

## 2022-01-05 RX ORDER — VANCOMYCIN HCL 1 G
1750 VIAL (EA) INTRAVENOUS EVERY 12 HOURS
Refills: 0 | Status: DISCONTINUED | OUTPATIENT
Start: 2022-01-05 | End: 2022-01-05

## 2022-01-05 RX ADMIN — Medication 650 MILLIGRAM(S): at 13:46

## 2022-01-05 RX ADMIN — Medication 81 MILLIGRAM(S): at 13:46

## 2022-01-05 RX ADMIN — INSULIN HUMAN 10 UNIT(S): 100 INJECTION, SOLUTION SUBCUTANEOUS at 17:43

## 2022-01-05 RX ADMIN — INSULIN GLARGINE 20 UNIT(S): 100 INJECTION, SOLUTION SUBCUTANEOUS at 22:30

## 2022-01-05 RX ADMIN — ATORVASTATIN CALCIUM 40 MILLIGRAM(S): 80 TABLET, FILM COATED ORAL at 22:30

## 2022-01-05 RX ADMIN — MORPHINE SULFATE 4 MILLIGRAM(S): 50 CAPSULE, EXTENDED RELEASE ORAL at 02:15

## 2022-01-05 RX ADMIN — MORPHINE SULFATE 2 MILLIGRAM(S): 50 CAPSULE, EXTENDED RELEASE ORAL at 10:25

## 2022-01-05 RX ADMIN — Medication 600 MILLIGRAM(S): at 01:33

## 2022-01-05 RX ADMIN — Medication 50 MILLILITER(S): at 17:50

## 2022-01-05 RX ADMIN — PIPERACILLIN AND TAZOBACTAM 200 GRAM(S): 4; .5 INJECTION, POWDER, LYOPHILIZED, FOR SOLUTION INTRAVENOUS at 08:10

## 2022-01-05 RX ADMIN — Medication 300 MILLIGRAM(S): at 17:48

## 2022-01-05 RX ADMIN — DULOXETINE HYDROCHLORIDE 30 MILLIGRAM(S): 30 CAPSULE, DELAYED RELEASE ORAL at 13:46

## 2022-01-05 RX ADMIN — Medication 4: at 19:00

## 2022-01-05 RX ADMIN — SODIUM ZIRCONIUM CYCLOSILICATE 10 GRAM(S): 10 POWDER, FOR SUSPENSION ORAL at 17:49

## 2022-01-05 RX ADMIN — HYDROMORPHONE HYDROCHLORIDE 1 MILLIGRAM(S): 2 INJECTION INTRAMUSCULAR; INTRAVENOUS; SUBCUTANEOUS at 19:01

## 2022-01-05 RX ADMIN — MORPHINE SULFATE 2 MILLIGRAM(S): 50 CAPSULE, EXTENDED RELEASE ORAL at 15:31

## 2022-01-05 RX ADMIN — Medication 250 MILLIGRAM(S): at 08:13

## 2022-01-05 RX ADMIN — MORPHINE SULFATE 2 MILLIGRAM(S): 50 CAPSULE, EXTENDED RELEASE ORAL at 15:45

## 2022-01-05 RX ADMIN — Medication 100 MILLIGRAM(S): at 14:35

## 2022-01-05 RX ADMIN — MORPHINE SULFATE 4 MILLIGRAM(S): 50 CAPSULE, EXTENDED RELEASE ORAL at 07:19

## 2022-01-05 NOTE — DISCHARGE NOTE PROVIDER - CARE PROVIDER_API CALL
Mihir Lopez)  Cardiovascular Disease; Interventional Cardiology  [address] [place] [address]  Phone: [phone]  Fax: [phone]  Follow Up Time: 1 week    Moi Vaughn)  Orthopaedic Surgery Surgery  [address] [address]  Phone: [phone]  Fax: [phone]  Follow Up Time: 1 week

## 2022-01-05 NOTE — ED PROVIDER NOTE - PROGRESS NOTE DETAILS
JR: Patient endorsed to Dr. Salgado- pt transferred to [facility] for r/o septic joint PACHECO SALEEM: pt arrived at [place], discussed with ortho/ will get XR studies and will draw new labs. PACHECO SALEEM: discussed with ortho, they request vanc/zosyn admission to medicine and they will add on for washout

## 2022-01-05 NOTE — DISCHARGE NOTE PROVIDER - NSDCFUSCHEDAPPT_GEN_ALL_CORE_FT
FLOWRE MARISCAL ; 01/21/2022 ; [facility] [address] FLOWER Fernando ; 02/08/2022 ; [facility] Endocrin [address] FLOWER MARISCAL ; 01/21/2022 ; [facility] [address] [place]  FLOWER MARISCAL ; 02/08/2022 ; [facility] Endocrin [address]

## 2022-01-05 NOTE — ED PROCEDURE NOTE - ATTENDING CONTRIBUTION TO CARE
I was present for the key portions of the procedure and available as supervisor for the entire procedure as documented.    I personally supervised the study.  I reviewed the images and interpretation by the resident/ACP and have edited where appropriate.

## 2022-01-05 NOTE — CONSULT NOTE ADULT - SUBJECTIVE AND OBJECTIVE BOX
HPI:  65yo Male  with pmh CAD s/p PCI, HFrEF (JOHN PAUL 2/2021: EF 20-25%), HTN, DM, bilateral knee septic arthritis with current right knee antibiotic spacer presents to ED with 1 week of worsening right knee pain and swelling with inability to ambulate for the past 3 days. Patient denies traumatic event. Pain in tight leg, with superficial wounds about the anterior knee and leg with serous drainage and bleeding; no purulence. No recent fever of chills. Patient states that he attempted to aspirate his right knee approximately 3 weeks ago.    Denied any CP, SOB, Fever, cough, GI or urinary symptoms.     In the ED VS stable, WBC 14, s/p Arthrocentesis showing 280k nucleated cells, 376k RBC, R knee xray showed Subcutaneous air anteriorly at right knee, may be related to recent   aspiration versus soft tissue infection. given Zoysn, vancomycin. planned for right knee irrigation and debridement, possible antibiotic spacer exchange by ortho   (05 Jan 2022 07:44)      PAST MEDICAL & SURGICAL HISTORY  Status post percutaneous transluminal coronary angioplasty  2 stents    Atherosclerosis of coronary artery  CAD (coronary artery disease)    Osteoarthritis    HLD (hyperlipidemia)    Blood clot due to device, implant, or graft  was on blood thinners    Diabetes  on insulin pump    HTN (hypertension)    CHF (congestive heart failure)  EF ~ 25%    History of celiac disease    Diverticulitis    STEMI (ST elevation myocardial infarction)    Diabetic neuropathy    Anxiety and depression    Other postprocedural status  Fixation hardware in foot LEFT    Stented coronary artery  10/18 heart attack  INFERIOR WALL MI    S/P CABG x 1  2018    S/P TKR (total knee replacement), right  with infection Mrsa   per pt he was cleared from MRSA infection    Surgery, elective  right knee wound debridement    History of open reduction and internal fixation (ORIF) procedure    H/O shoulder surgery  right    AICD (automatic cardioverter/defibrillator) present    Cholecystostomy care  drain inserted 2020 &amp; removed 4 months ago    History of tonsillectomy    History of hip replacement, total, right        FAMILY HISTORY:  FAMILY HISTORY:  Family history of heart disease (Mother)    Family history of allergies  daughter        SOCIAL HISTORY:  []smoker  []Alcohol  []Drug    ALLERGIES:  ACE inhibitors (Hives)  carvedilol (Other)  enalapril (Hives)  Entresto (Other)      MEDICATIONS:  MEDICATIONS  (STANDING):  aspirin  chewable 81 milliGRAM(s) Oral daily  atorvastatin 40 milliGRAM(s) Oral at bedtime  dextrose 40% Gel 15 Gram(s) Oral once  dextrose 5%. 1000 milliLiter(s) (50 mL/Hr) IV Continuous <Continuous>  dextrose 5%. 1000 milliLiter(s) (100 mL/Hr) IV Continuous <Continuous>  dextrose 50% Injectable 25 Gram(s) IV Push once  dextrose 50% Injectable 12.5 Gram(s) IV Push once  dextrose 50% Injectable 25 Gram(s) IV Push once  DULoxetine 30 milliGRAM(s) Oral daily  glucagon  Injectable 1 milliGRAM(s) IntraMuscular once  insulin glargine Injectable (LANTUS) 20 Unit(s) SubCutaneous at bedtime  insulin lispro (ADMELOG) corrective regimen sliding scale   SubCutaneous three times a day before meals  levothyroxine 25 MICROGram(s) Oral daily  pantoprazole    Tablet 40 milliGRAM(s) Oral before breakfast  rifAMPin 300 milliGRAM(s) Oral two times a day  sodium zirconium cyclosilicate 10 Gram(s) Oral two times a day  spironolactone 25 milliGRAM(s) Oral daily  vancomycin  IVPB 1500 milliGRAM(s) IV Intermittent every 12 hours    MEDICATIONS  (PRN):  acetaminophen     Tablet .. 650 milliGRAM(s) Oral every 6 hours PRN Temp greater or equal to 38C (100.4F), Mild Pain (1 - 3)  ALPRAZolam 0.5 milliGRAM(s) Oral every 8 hours PRN anxiety  aluminum hydroxide/magnesium hydroxide/simethicone Suspension 30 milliLiter(s) Oral every 4 hours PRN Dyspepsia  melatonin 3 milliGRAM(s) Oral at bedtime PRN Insomnia  morphine  - Injectable 2 milliGRAM(s) IV Push every 4 hours PRN Moderate Pain (4 - 6)  ondansetron Injectable 4 milliGRAM(s) IV Push every 8 hours PRN Nausea and/or Vomiting      HOME MEDICATIONS:  Home Medications:  ALPRAZolam 0.5 mg oral tablet: 1 tab(s) orally every 8 hours (05 Jan 2022 09:05)  aspirin 81 mg oral delayed release tablet: 1 tab(s) orally once a day (05 Jan 2022 09:26)  DULoxetine 30 mg oral delayed release capsule: 1 cap(s) orally 2 times a day (05 Jan 2022 09:25)  Effient 10 mg oral tablet: 1 tab(s) orally once a day (05 Jan 2022 09:26)  fentaNYL 37.5 mcg/hr transdermal film, extended release: 1 film(s) transdermal every 72 hours (05 Jan 2022 09:05)  furosemide 40 mg oral tablet: 1 tab(s) orally once a day, As Needed (05 Jan 2022 09:26)  insulin glargine: 20 unit(s) subcutaneously once a day (05 Jan 2022 09:05)  levothyroxine 25 mcg (0.025 mg) oral tablet: 1 tab(s) orally once a day (05 Jan 2022 09:05)  morphine 15 mg oral tablet: 1 tab(s) orally every 4 hours, As Needed - for severe pain (7 to 10 out of 10) (05 Jan 2022 09:05)  pantoprazole 40 mg oral delayed release tablet: 1 tab(s) orally once a day (05 Jan 2022 09:05)  spironolactone 25 mg oral tablet: 1 tab(s) orally once a day (05 Jan 2022 09:05)      VITALS:   T(F): 97.7 (01-05 @ 15:16), Max: 98.3 (01-05 @ 08:00)  HR: 87 (01-05 @ 18:04) (76 - 92)  BP: 130/61 (01-05 @ 18:04) (100/56 - 130/61)  BP(mean): --  RR: 19 (01-05 @ 18:04) (14 - 20)  SpO2: 97% (01-05 @ 18:04) (93% - 98%)    I&O's Summary      REVIEW OF SYSTEMS:  CONSTITUTIONAL: No weakness, fevers or chills  EYES: No visual changes  ENT: No vertigo or throat pain   NECK: No pain or stiffness  RESPIRATORY: No cough, wheezing, hemoptysis; No shortness of breath  CARDIOVASCULAR: No chest pain or palpitations  GASTROINTESTINAL: No abdominal or epigastric pain. No nausea, vomiting, or hematemesis; No diarrhea or constipation. No melena or hematochezia.  GENITOURINARY: No dysuria, frequency or hematuria  NEUROLOGICAL: No numbness or weakness  SKIN: No itching, no rashes  MSK: no    PHYSICAL EXAM:  NEURO: patient is awake , alert and oriented  GEN: Not in acute distress  NECK: no thyroid enlargement, no JVD  LUNGS: Clear to auscultation bilaterally   CARDIOVASCULAR: S1/S2 present, RRR , no murmurs or rubs, no carotid bruits,  + PP bilaterally  ABD: Soft, non-tender, non-distended, +BS  EXT: RLE erythema and tender to touch  SKIN: Intact    LABS:                        11.5   14.00 )-----------( 216      ( 05 Jan 2022 06:10 )             37.2     01-05    133<L>  |  92<L>  |  37<H>  ----------------------------<  237<H>  5.7<H>   |  20  |  1.3    Ca    9.6      05 Jan 2022 11:00  Phos  4.2     01-05  Mg     2.1     01-05    TPro  6.8  /  Alb  3.4<L>  /  TBili  0.6  /  DBili  x   /  AST  26  /  ALT  25  /  AlkPhos  119<H>  01-05    PT/INR - ( 05 Jan 2022 05:32 )   PT: 13.70 sec;   INR: 1.19 ratio         PTT - ( 05 Jan 2022 05:32 )  PTT:28.0 sec  Lactate, Blood: 1.1 mmol/L (01-05-22 @ 11:00)  Sedimentation Rate, Erythrocyte: 76 mm/Hr *H* (01-05-22 @ 06:10)          Troponin trend:            RADIOLOGY:  -CXR:  -TTE:  -CCTA:  -STRESS TEST:  -CATHETERIZATION:    ECG:  Atrial sensed ventricular paced rhythm  TELEMETRY EVENTS:  
Orthopaedic Surgery Consult Note    65yo Male  with pmh CAD s/p PCI, HFrEF (JOHN PAUL 2/2021: EF 20-25%), HTN, DM, bilateral knee septic arthritis with current right knee antibiotic spacer presents to ED with 1 week of worsening right knee pain and swelling with inability to ambulate for the past 3 days. Patient denies traumatic event. Pain in tight leg, with superficial wounds about the anterior knee and leg with serous drainage and bleeding; no purulence. No recent fever of chills. Patient states that he attempted to aspirate his right knee approximately 3 weeks ago.      PMH/PSH    Family history of heart disease (Mother)    Family history of heart disease (Mother)    Family history of allergies    MEWS Score    Status post percutaneous transluminal coronary angioplasty    History of surgery to heart and great vessels, presenting hazards to health    Atherosclerosis of coronary artery    Type 2 diabetes mellitus with neurological manifestations    Type 2 diabetes mellitus    HTN (hypertension)    Osteoarthritis    Chronic systolic CHF (congestive heart failure)    HLD (hyperlipidemia)    Hyperkalemia    COPD, moderate    CKD (chronic kidney disease) stage 2, GFR 60-89 ml/min    Blood clot due to device, implant, or graft    Diabetes    HTN (hypertension)    CHF (congestive heart failure)    History of celiac disease    MRSA bacteremia    Diverticulitis    STEMI (ST elevation myocardial infarction)    Diabetic neuropathy    Celiac disease    Anxiety and depression    Diabetic foot ulcer    HTN (hypertension)    CHF (congestive heart failure)    Knee pain    Other postprocedural status    History of surgery to major organs, presenting hazards to health    Stented coronary artery    S/P CABG x 1    S/P TKR (total knee replacement), right    Surgery, elective    Surgery, elective    History of open reduction and internal fixation (ORIF) procedure    H/O shoulder surgery    AICD (automatic cardioverter/defibrillator) present    Cholecystostomy care    History of tonsillectomy    History of hip replacement, total, right      Home Medications:  acetaminophen 500 mg oral tablet: 2 tab(s) orally every 8 hours (15 Nael 2021 15:35)  ALPRAZolam 0.5 mg oral tablet: 1 tab(s) orally every 8 hours (26 Apr 2021 12:11)  baclofen 10 mg oral tablet: 1 tab(s) orally 3 times a day, As needed, Muscle Spasm (15 Nael 2021 15:35)  ferrous sulfate 325 mg (65 mg elemental iron) oral tablet: 1 tab(s) orally once a day (15 Nael 2021 15:35)  gabapentin 600 mg oral tablet: 1 tab(s) orally 3 times a day (15 Nael 2021 15:35)  insulin glargine: 20 unit(s) subcutaneously once a day (15 Nael 2021 15:35)  Jardiance 10 mg oral tablet: 1 tab(s) orally once a day (in the morning) (26 Apr 2021 12:11)  melatonin 10 mg oral tablet: 1 tab(s) orally once a day (at bedtime), As needed, Insomnia (26 Apr 2021 12:11)  morphine 15 mg oral tablet: 1 tab(s) orally every 4 hours, As Needed - for severe pain (7 to 10 out of 10) (26 Apr 2021 12:11)  nafcillin: 2 gram(s) intravenously every 4 hours (15 Nael 2021 15:35)  nystatin 100,000 units/g topical cream: 1 application topically 2 times a day (15 Nael 2021 15:35)  spironolactone 25 mg oral tablet: 1 tab(s) orally once a day (26 Apr 2021 12:11)      Allergies  ACE inhibitors (Hives)  carvedilol (Other)  enalapril (Hives)  Entresto (Other)      T(C): 36.7 (01-05-22 @ 03:10), Max: 36.7 (01-05-22 @ 03:10)  HR: 87 (01-05-22 @ 03:10) (76 - 87)  BP: 100/56 (01-05-22 @ 03:10) (100/56 - 102/51)  RR: 17 (01-05-22 @ 03:10) (14 - 17)  SpO2: 97% (01-05-22 @ 03:10) (96% - 97%)    P/E:  AOx3, NAD  Non-labored breathing    RLE  Superficial ulcerations anterior superior knee and anterior leg, minimal serous discharge, no bleeding or purulence  Well healed midline knee incision and skin graft  Swelling/erythema about knee  No deformity  ROM limited by pain, pain with short arc knee ROM  TTP anterior leg, minimall TTP medial/lateral knee  Compartments soft and compressible, no pain w/ passive stretch of digits  SILT SP/DP/T/SURAL  Firing TA/EHL/FHL/GS  DP pulse 2+, cap refill <2      Labs                        11.5   12.89 )-----------( 206      ( 05 Jan 2022 01:20 )             37.9     01-05    128<L>  |  91<L>  |  37<H>  ----------------------------<  303<H>  5.5<H>   |  22  |  1.3    Ca    9.1      05 Jan 2022 01:20    TPro  6.8  /  Alb  3.4<L>  /  TBili  0.6  /  DBili  x   /  AST  26  /  ALT  25  /  AlkPhos  119<H>  01-05    LIVER FUNCTIONS - ( 05 Jan 2022 01:20 )  Alb: 3.4 g/dL / Pro: 6.8 g/dL / ALK PHOS: 119 U/L / ALT: 25 U/L / AST: 26 U/L / GGT: x               Imaging: XR right femur, knee, tib/fib; s/p right knee antibiotic spacer and right hip ORIF. No acute fracture or dislocation      A/P:  65yo Male s/p right knee antibiotic spacer with 1 week right knee pain and swelling. Right knee arthrocentesis performed, 30cc reddish turbid fluid collected. cell count right knee [phone]. Plan for right knee I&D possible antibiotic spacer exchange    Preop labs: bmp, cbc, coags, type and screen x2, ekg, cxr, utox  ESR, CRP  f/u synovial fluid labs:  gram stain, culture, crystals  Diet: NPO  IV Abx  NWB RLE  Pain control  Home meds  DVT ppx  Serial Hgb/Hct  Rapid COVID test  
Orthopaedic Surgery Consult Note    67yo Male  with pmh CAD s/p PCI, HFrEF (JOHN PAUL 2/2021: EF 20-25%), HTN, DM, bilateral knee septic arthritis with current right knee antibiotic spacer presents to ED with 1 week of worsening right knee pain and swelling with inability to ambulate for the past 3 days. Patient denies traumatic event. Pain in tight leg, with superficial wounds about the anterior knee and leg with serous drainage and bleeding; no purulence. No recent fever of chills. Patient states that he attempted to aspirate his right knee approximately 3 weeks ago.      PMH/PSH    Family history of heart disease (Mother)    Family history of heart disease (Mother)    Family history of allergies    MEWS Score    Status post percutaneous transluminal coronary angioplasty    History of surgery to heart and great vessels, presenting hazards to health    Atherosclerosis of coronary artery    Type 2 diabetes mellitus with neurological manifestations    Type 2 diabetes mellitus    HTN (hypertension)    Osteoarthritis    Chronic systolic CHF (congestive heart failure)    HLD (hyperlipidemia)    Hyperkalemia    COPD, moderate    CKD (chronic kidney disease) stage 2, GFR 60-89 ml/min    Blood clot due to device, implant, or graft    Diabetes    HTN (hypertension)    CHF (congestive heart failure)    History of celiac disease    MRSA bacteremia    Diverticulitis    STEMI (ST elevation myocardial infarction)    Diabetic neuropathy    Celiac disease    Anxiety and depression    Diabetic foot ulcer    HTN (hypertension)    CHF (congestive heart failure)    Knee pain    Other postprocedural status    History of surgery to major organs, presenting hazards to health    Stented coronary artery    S/P CABG x 1    S/P TKR (total knee replacement), right    Surgery, elective    Surgery, elective    History of open reduction and internal fixation (ORIF) procedure    H/O shoulder surgery    AICD (automatic cardioverter/defibrillator) present    Cholecystostomy care    History of tonsillectomy    History of hip replacement, total, right      Home Medications:  acetaminophen 500 mg oral tablet: 2 tab(s) orally every 8 hours (15 Nael 2021 15:35)  ALPRAZolam 0.5 mg oral tablet: 1 tab(s) orally every 8 hours (26 Apr 2021 12:11)  baclofen 10 mg oral tablet: 1 tab(s) orally 3 times a day, As needed, Muscle Spasm (15 Nael 2021 15:35)  ferrous sulfate 325 mg (65 mg elemental iron) oral tablet: 1 tab(s) orally once a day (15 Nael 2021 15:35)  gabapentin 600 mg oral tablet: 1 tab(s) orally 3 times a day (15 Nael 2021 15:35)  insulin glargine: 20 unit(s) subcutaneously once a day (15 Nael 2021 15:35)  Jardiance 10 mg oral tablet: 1 tab(s) orally once a day (in the morning) (26 Apr 2021 12:11)  melatonin 10 mg oral tablet: 1 tab(s) orally once a day (at bedtime), As needed, Insomnia (26 Apr 2021 12:11)  morphine 15 mg oral tablet: 1 tab(s) orally every 4 hours, As Needed - for severe pain (7 to 10 out of 10) (26 Apr 2021 12:11)  nafcillin: 2 gram(s) intravenously every 4 hours (15 Nael 2021 15:35)  nystatin 100,000 units/g topical cream: 1 application topically 2 times a day (15 Nael 2021 15:35)  spironolactone 25 mg oral tablet: 1 tab(s) orally once a day (26 Apr 2021 12:11)      Allergies  ACE inhibitors (Hives)  carvedilol (Other)  enalapril (Hives)  Entresto (Other)      T(C): 36.7 (01-05-22 @ 03:10), Max: 36.7 (01-05-22 @ 03:10)  HR: 87 (01-05-22 @ 03:10) (76 - 87)  BP: 100/56 (01-05-22 @ 03:10) (100/56 - 102/51)  RR: 17 (01-05-22 @ 03:10) (14 - 17)  SpO2: 97% (01-05-22 @ 03:10) (96% - 97%)    P/E:  AOx3, NAD  Non-labored breathing    RLE  Superficial ulcerations anterior superior knee and anterior leg, minimal serous discharge, no bleeding or purulence  Well healed midline knee incision and skin graft  Swelling/erythema about knee  No deformity  ROM limited by pain, pain with short arc knee ROM  TTP anterior leg, minimall TTP medial/lateral knee  Compartments soft and compressible, no pain w/ passive stretch of digits  SILT SP/DP/T/SURAL  Firing TA/EHL/FHL/GS  DP pulse 2+, cap refill <2      Labs                        11.5   12.89 )-----------( 206      ( 05 Jan 2022 01:20 )             37.9     01-05    128<L>  |  91<L>  |  37<H>  ----------------------------<  303<H>  5.5<H>   |  22  |  1.3    Ca    9.1      05 Jan 2022 01:20    TPro  6.8  /  Alb  3.4<L>  /  TBili  0.6  /  DBili  x   /  AST  26  /  ALT  25  /  AlkPhos  119<H>  01-05    LIVER FUNCTIONS - ( 05 Jan 2022 01:20 )  Alb: 3.4 g/dL / Pro: 6.8 g/dL / ALK PHOS: 119 U/L / ALT: 25 U/L / AST: 26 U/L / GGT: x               Imaging: XR right femur, knee, tib/fib; s/p right knee antibiotic spacer and right hip ORIF. No acute fracture or dislocation      A/P:  67yo Male s/p right knee antibiotic spacer with 1 week right knee pain and swelling. Right knee arthrocentesis performed, 30cc reddish turbid fluid collected.    Preop labs: bmp, cbc, coags, type and screen x2, ekg, cxr, utox  ESR, CRP  f/u synovial fluid labs: cell count, gram stain, culture, crystals  Diet: NPO  NWB RLE  Pain control  Home meds  DVT ppx  Serial Hgb/Hct  Rapid COVID test        
FLOWER MARISCAL  66y, Male  Allergy: ACE inhibitors (Hives)  carvedilol (Other)  enalapril (Hives)  Entresto (Other)      CHIEF COMPLAINT:   Right knee pain (2022 07:44)      LOS      HPI  HPI:  67yo Male  with pmh CAD s/p PCI, HFrEF (JOHN PAUL 2021: EF 20-25%), HTN, DM, bilateral knee septic arthritis with current right knee antibiotic spacer presents to ED with 1 week of worsening right knee pain and swelling with inability to ambulate for the past 3 days. Patient denies traumatic event. Pain in tight leg, with superficial wounds about the anterior knee and leg with serous drainage and bleeding; no purulence. No recent fever of chills. Patient states that he attempted to aspirate his right knee approximately 3 weeks ago.    Denied any CP, SOB, Fever, cough, GI or urinary symptoms.     In the ED VS stable, WBC 14, s/p Arthrocentesis showing 280k nucleated cells, 376k RBC, R knee xray showed Subcutaneous air anteriorly at right knee, may be related to recent   aspiration versus soft tissue infection. given Zoysn, vancomycin. planned for right knee irrigation and debridement, possible antibiotic spacer exchange by ortho   (2022 07:44)      INFECTIOUS DISEASE HISTORY:  ID consulted for R knee septic arthritis  Hx Septic Arthritis of left knee with MSSA bacteremia   - s/p OR washout  -- purulent fluid in left knee with significant tricompartmental arthritis   - Blood Cx  MSSA  - OR Cx  MSSA  - Blood Cx  NG,  growing Staph   - Blood Cx - NG  - JOHN PAUL : no evidence of valvular or lead vegation; noted mild, non-mobile atheroma seen in the thoracic aorta.   - MR Knee w/wo IV Cont, Left (21 @ 19:05): Septic arthritis of the knee with osteomyelitis in the distal femur and proximal tibia. Associated large joint effusion with extensive synovitis. Noadditional collection identified.  - s/p knee tap  -- 15 cc of blood fluid; WBC 75657 (95% PMN)  - s/p knee tap  WBC 72717 (97% PMN)  - s/p Arthroscopic drainage of knee  -- no purulence but noted large organized hematoma -- antibiotic beads placed - Wound Cx  NG      Hx PJI of RIght knee  - Hx of Recurrent right knee PJI- MRSA from 2019; Completed 6 week course of vancomycin/rifampin with antibiotic spacer placed in 2020  - He has completed additional course of daptomycin/cefepime (per previous ID notes, ended 10/29/2020).  PMH  PAST MEDICAL & SURGICAL HISTORY:  Status post percutaneous transluminal coronary angioplasty  2 stents    Atherosclerosis of coronary artery  CAD (coronary artery disease)    Osteoarthritis    HLD (hyperlipidemia)    Blood clot due to device, implant, or graft  was on blood thinners    Diabetes  on insulin pump    HTN (hypertension)    CHF (congestive heart failure)  EF ~ 25%    History of celiac disease    Diverticulitis    STEMI (ST elevation myocardial infarction)    Diabetic neuropathy    Anxiety and depression    Other postprocedural status  Fixation hardware in foot LEFT    Stented coronary artery  10/18 heart attack  INFERIOR WALL MI    S/P CABG x 1      S/P TKR (total knee replacement), right  with infection Mrsa   per pt he was cleared from MRSA infection    Surgery, elective  right knee wound debridement    History of open reduction and internal fixation (ORIF) procedure    H/O shoulder surgery  right    AICD (automatic cardioverter/defibrillator) present    Cholecystostomy care  drain inserted  &amp; removed 4 months ago    History of tonsillectomy    History of hip replacement, total, right        FAMILY HISTORY  Family history of heart disease (Mother)    Family history of heart disease (Mother)    Family history of allergies        SOCIAL HISTORY  Social History:  denies smoking/etoh/illicit drug use (2022 07:44)        ROS  General: Denies rigors, nightsweats  HEENT: Denies headache, rhinorrhea, sore throat, eye pain  CV: Denies CP, palpitations  PULM: Denies wheezing, hemoptysis  GI: Denies hematemesis, hematochezia, melena  : Denies discharge, hematuria  MSK: as noted above   SKIN: Denies rash, lesions  NEURO: Denies paresthesias, weakness  PSYCH: Denies depression, anxiety     VITALS:  T(F): 97.9, Max: 98.3 (22 @ 08:00)  HR: 88  BP: 122/64  RR: 18Vital Signs Last 24 Hrs  T(C): 36.6 (2022 10:14), Max: 36.8 (2022 08:00)  T(F): 97.9 (2022 10:14), Max: 98.3 (2022 08:00)  HR: 88 (2022 10:14) (76 - 92)  BP: 122/64 (2022 10:14) (100/56 - 122/64)  BP(mean): --  RR: 18 (2022 10:14) (14 - 20)  SpO2: 98% (2022 10:14) (93% - 98%)    PHYSICAL EXAM:  Gen: NAD, resting in bed  HEENT: Normocephalic, atraumatic  Neck: supple, no lymphadenopathy  CV: Regular rate & regular rhythm  Lungs: decreased BS at bases, no fremitus  Abdomen: Soft, BS present  Ext: Warm, well perfused, R knee edema, decreased ROM, bilateral LE excoriations  Neuro: non focal, awake  Skin: no rash, no erythema  Lines: no phlebitis     TESTS & MEASUREMENTS:                        11.5   14.00 )-----------( 216      ( 2022 06:10 )             37.2         132<L>  |  91<L>  |  38<H>  ----------------------------<  221<H>  4.6   |  21  |  1.3    Ca    9.9      2022 05:32  Phos  4.2       Mg     2.1         TPro  6.8  /  Alb  3.4<L>  /  TBili  0.6  /  DBili  x   /  AST  26  /  ALT  25  /  AlkPhos  119<H>      eGFR if Non African American: 58 mL/min/1.73M2 (22 @ 05:32)  eGFR if : 67 mL/min/1.73M2 (22 @ 05:32)  eGFR if Non African American: 58 mL/min/1.73M2 (22 @ 01:20)  eGFR if : 67 mL/min/1.73M2 (22 @ 01:20)    LIVER FUNCTIONS - ( 2022 01:20 )  Alb: 3.4 g/dL / Pro: 6.8 g/dL / ALK PHOS: 119 U/L / ALT: 25 U/L / AST: 26 U/L / GGT: x           Urinalysis Basic - ( 2022 08:30 )    Color: Light Yellow / Appearance: Clear / S.032 / pH: x  Gluc: x / Ketone: Trace  / Bili: Negative / Urobili: <2 mg/dL   Blood: x / Protein: 30 mg/dL / Nitrite: Negative   Leuk Esterase: Negative / RBC: 2 /HPF / WBC 2 /HPF   Sq Epi: x / Non Sq Epi: 0 /HPF / Bacteria: Negative      Culture - Aspirate with Gram Stain (collected 21 @ 14:00)  Source: Joint Fl None  Final Report (21 @ 17:46):    Few Pseudomonas aeruginosa  Organism: Pseudomonas aeruginosa (21 @ 17:46)  Organism: Pseudomonas aeruginosa (21 @ 17:46)      -  Amikacin: S <=16      -  Aztreonam: S <=4      -  Cefepime: S 8      -  Ceftazidime: S 4      -  Ciprofloxacin: S <=0.25      -  Gentamicin: S 4      -  Imipenem: S 2      -  Levofloxacin: S <=0.5      -  Meropenem: S 2      -  Piperacillin/Tazobactam: S <=8      -  Tobramycin: S <=2      Method Type: YOLIE      Culture - Body Fluid with Gram Stain (collected 21 @ 16:43)  Source: Bile biliary drain  Gram Stain (21 @ 23:26):    Numerous polymorphonuclear leukocytes per low power field    No organisms seen per oil power field  Final Report (21 @ 17:49):    Few Pseudomonas aeruginosa    Few Enterococcus faecium (vancomycin resistant)  Organism: Pseudomonas aeruginosa  Enterococcus faecium (vancomycin resistant) (21 @ 17:49)  Organism: Enterococcus faecium (vancomycin resistant) (21 @ 17:49)      -  Ampicillin: R >8 Predicts results to ampicillin/sulbactam, amoxacillin-clavulanate and  piperacillin-tazobactam.      -  Daptomycin: SDD 4 The breakpoint for SDD (sensitive dose dependent)is based on a dosage regimen of 8-12 mg/kg administered every 24 h in adults and is intended for serious infections due to E. faecium. Consultation with an infectious diseases specialist is recommended.      -  Levofloxacin: R >4      -  Linezolid: S 1      -  Tetra/Doxy: S <=1      -  Vancomycin: R >16      Method Type: YOLIE  Organism: Pseudomonas aeruginosa (21 @ 17:49)      -  Amikacin: S <=16      -  Aztreonam: S <=4      -  Cefepime: S <=2      -  Ceftazidime: S 4      -  Ciprofloxacin: S <=0.25      -  Gentamicin: S 4      -  Imipenem: S <=1      -  Levofloxacin: S <=0.5      -  Meropenem: S <=1      -  Piperacillin/Tazobactam: S <=8      -  Tobramycin: S <=2      Method Type: YOLIE    Culture - Blood (collected 21 @ 06:49)  Source: .Blood None  Final Report (21 @ 18:00):    No Growth Final    Culture - Blood (collected 21 @ 06:49)  Source: .Blood None  Final Report (21 @ 18:00):    No Growth Final    Culture - Blood (collected 21 @ 17:37)  Source: .Blood None  Final Report (21 @ 23:00):    No Growth Final    Culture - Blood (collected 21 @ 11:15)  Source: .Blood None  Final Report (21 @ 23:00):    No Growth Final    Culture - Blood (collected 21 @ 08:13)  Source: .Blood Blood  Gram Stain (21 @ 15:21):    Growth in anaerobic bottle: Gram Positive Cocci in Clusters  Final Report (21 @ 14:03):    Growth in anaerobic bottle: Staphylococcus aureus    See previous culture [id number]    Culture - Blood (collected 21 @ 08:13)  Source: .Blood Blood  Gram Stain (21 @ 02:25):    Growth in anaerobic bottle: Gram Positive Cocci in Clusters  Final Report (21 @ 20:23):    Growth in anaerobic bottle: Staphylococcus aureus    See previous culture [id number]    Culture - Blood (collected 21 @ 06:14)  Source: .Blood None  Final Report (21 @ 13:01):    No Growth Final    Culture - Surgical Swab (collected 21 @ 08:52)  Source: .Surgical Swab None  Final Report (21 @ 17:29):    Moderate Staphylococcus aureus  Organism: Staphylococcus aureus (21 @ 17:29)  Organism: Staphylococcus aureus (21 @ 17:29)      -  Ampicillin/Sulbactam: S <=8/4      -  Cefazolin: S <=4      -  Clindamycin: R <=0.25 This isolate is presumed to be clindamycin resistant based on detection of inducible resistance. Clindamycin may still be effective in some patients.      -  Erythromycin: R >4      -  Gentamicin: S <=1 Should not be used as monotherapy      -  Oxacillin: S <=0.25      -  Penicillin: R 4      -  RIF- Rifampin: S <=1 Should not be used as monotherapy      -  Tetra/Doxy: S <=1      -  Trimethoprim/Sulfamethoxazole: S <=0.5/9.5      -  Vancomycin: S 1      Method Type: YOLIE    Culture - Body Fluid with Gram Stain (collected 21 @ 01:15)  Source: .Body Fluid Synovial Fluid  Gram Stain (21 @ 11:35):    polymorphonuclear leukocytes per low power field    No organisms seen per oil power field    by cytocentrifuge  Final Report (05-10-21 @ 10:35):    Numerous Staphylococcus aureus  Organism: Staphylococcus aureus (05-10-21 @ 10:35)  Organism: Staphylococcus aureus (05-10-21 @ 10:35)      -  Ampicillin/Sulbactam: S <=8/4      -  Cefazolin: S <=4      -  Clindamycin: R <=0.25 This isolate is presumed to be clindamycin resistant based on detection of inducible resistance. Clindamycin may still be effective in some patients.      -  Erythromycin: R >4      -  Gentamicin: S <=1 Should not be used as monotherapy      -  Oxacillin: S <=0.25      -  Penicillin: R 4      -  RIF- Rifampin: S <=1 Should not be used as monotherapy      -  Tetra/Doxy: S <=1      -  Trimethoprim/Sulfamethoxazole: S <=0.5/9.5      -  Vancomycin: S 2      Method Type: YOLIE    Culture - Urine (collected 21 @ 00:31)  Source: .Urine Clean Catch (Midstream)  Final Report (21 @ 10:57):    <10,000 CFU/mL Normal Urogenital Tricia    Culture - Blood (collected 21 @ 00:31)  Source: .Blood Blood  Gram Stain (21 @ 01:48):    Growth in aerobic bottle: Gram Positive Cocci in Clusters  Final Report (21 @ 16:38):    Growth in aerobic bottle: Staphylococcus aureus    ***Blood Panel PCR results on this specimen are available    approximately 3 hours after the Gram stain result.***    Gram stain, PCR, and/or culture results may not always    correspond due to difference in methodologies.    ************************************************************    This PCR assay was performed by multiplex PCR. This    Assay tests for 66 bacterial and resistance gene targets.    Please refer to the [facility] test directory    at [email or web address] for details.  Organism: Blood Culture PCR  Staphylococcus aureus (21 @ 16:38)  Organism: Staphylococcus aureus (21 @ 16:38)      -  Ampicillin/Sulbactam: S <=8/4      -  Cefazolin: S <=4      -  Clindamycin: R 0.5 This isolate is presumed to be clindamycin resistant based on detection of inducible resistance. Clindamycin may still be effective in some patients.      -  Erythromycin: R >4      -  Gentamicin: S <=1 Should not be used as monotherapy      -  Oxacillin: S <=0.25      -  Penicillin: R 4      -  RIF- Rifampin: S <=1 Should not be used as monotherapy      -  Tetra/Doxy: S <=1      -  Trimethoprim/Sulfamethoxazole: S <=0.5/9.5      -  Vancomycin: S 2      Method Type: YOLIE  Organism: Blood Culture PCR (21 @ 16:38)      -  Staphylococcus aureus: Detec Any isolate of Staphylococcus aureus from a blood culture is NOT considered a contaminant.      Method Type: PCR    Culture - Blood (collected 21 @ 00:31)  Source: .Blood Blood  Gram Stain (21 @ 12:53):    Growth in aerobic bottle: Gram Positive Cocci in Clusters    Growth in anaerobic bottle: Gram Positive Cocci in Clusters  Final Report (21 @ 16:39):    Growth in aerobic and anaerobic bottles: Staphylococcus aureus    See previous culture [id number]    Culture - Urine (collected 21 @ 09:57)  Source: .Urine Clean Catch (Midstream)  Final Report (21 @ 13:00):    <10,000 CFU/mL Normal Urogenital Tricia            INFECTIOUS DISEASES TESTING  COVID-19 PCR: NotDetec (22 @ 04:53)  COVID-19 PCR: Detected (21 @ 14:33)  COVID-19 PCR: Detected (21 @ 13:47)      INFLAMMATORY MARKERS  Sedimentation Rate, Erythrocyte: 76 mm/Hr (22 @ 06:10)      RADIOLOGY & ADDITIONAL TESTS:  I have personally reviewed the last Chest xray  CXR      CT      CARDIOLOGY TESTING  12 Lead ECG:   Ventricular Rate 71 BPM    Atrial Rate 71 BPM    P-R Interval 160 ms    QRS Duration 174 ms    Q-T Interval 398 ms    QTC Calculation(Bazett) 432 ms    P Axis 46 degrees    R Axis 156 degrees    T Axis 130 degrees    Diagnosis Line Atrial-sensed ventricular-paced rhythm  Abnormal ECG    Confirmed by MELVIN PEDRAZA MD ([id number]) on 2022 12:08:07 PM (22 @ 00:17)      MEDICATIONS  aspirin  chewable 81 Oral daily  atorvastatin 40 Oral at bedtime  cefepime   IVPB 2000 IV Intermittent every 12 hours  dextrose 40% Gel 15 Oral once  dextrose 5%. 1000 IV Continuous <Continuous>  dextrose 5%. 1000 IV Continuous <Continuous>  dextrose 50% Injectable 25 IV Push once  dextrose 50% Injectable 12.5 IV Push once  dextrose 50% Injectable 25 IV Push once  DULoxetine 30 Oral daily  glucagon  Injectable 1 IntraMuscular once  insulin glargine Injectable (LANTUS) 20 SubCutaneous at bedtime  levothyroxine 25 Oral daily  metroNIDAZOLE  IVPB 500 IV Intermittent every 8 hours  pantoprazole    Tablet 40 Oral before breakfast  spironolactone 25 Oral daily  vancomycin  IVPB 1500 IV Intermittent every 12 hours        ANTIBIOTICS:  cefepime   IVPB 2000 milliGRAM(s) IV Intermittent every 12 hours  metroNIDAZOLE  IVPB 500 milliGRAM(s) IV Intermittent every 8 hours  vancomycin  IVPB 1500 milliGRAM(s) IV Intermittent every 12 hours      ALLERGIES:  ACE inhibitors (Hives)  carvedilol (Other)  enalapril (Hives)  Entresto (Other)        
VASCULAR SURGERY CONSULT NOTE      HPI:  65yo Male  with pmh CAD s/p PCI, HFrEF (JOHN PAUL 2021: EF 20-25%), HTN, DM, bilateral knee septic arthritis with current right knee antibiotic spacer presents to ED with 1 week of worsening right knee pain and swelling with inability to ambulate for the past 3 days. Patient denies traumatic event. Pain in tight leg, with BL superficial wounds about the anterior knee and leg with serous drainage and bleeding; no purulence. No recent fever of chills. Patient states that he attempted to aspirate his right knee approximately 3 weeks ago.    Denied any CP, SOB, Fever, cough, GI or urinary symptoms.     In the ED VS stable, WBC 14, s/p Arthrocentesis showing 280k nucleated cells, 376k RBC, R knee xray showed Subcutaneous air anteriorly at right knee, may be related to recent   aspiration versus soft tissue infection. given Zoysn, vancomycin. planned for right knee irrigation and debridement, possible antibiotic spacer exchange by ortho   (2022 07:44)    Vascular team was consulted foBL LE PAD. PT and DP Dopplerable BL. LE BL are warm, sensation +, motor+. BL Swollen R Knee, Nontense, edematous foot BL below the ankle.   Has history of 4 skin grafts for unhealing LE wounds. Pt is a poor historian, admits that he was followed by vascular surgeon, but can't recall his name.         PAST MEDICAL & SURGICAL HISTORY:  Status post percutaneous transluminal coronary angioplasty  2 stents    Atherosclerosis of coronary artery  CAD (coronary artery disease)    Osteoarthritis    HLD (hyperlipidemia)    Blood clot due to device, implant, or graft  was on blood thinners    Diabetes  on insulin pump    HTN (hypertension)    CHF (congestive heart failure)  EF ~ 25%    History of celiac disease    Diverticulitis    STEMI (ST elevation myocardial infarction)    Diabetic neuropathy    Anxiety and depression    Other postprocedural status  Fixation hardware in foot LEFT    Stented coronary artery  10/18 heart attack  INFERIOR WALL MI    S/P CABG x 1      S/P TKR (total knee replacement), right  with infection Mrsa   per pt he was cleared from MRSA infection    Surgery, elective  right knee wound debridement    History of open reduction and internal fixation (ORIF) procedure    H/O shoulder surgery  right    AICD (automatic cardioverter/defibrillator) present    Cholecystostomy care  drain inserted  &amp; removed 4 months ago    History of tonsillectomy    History of hip replacement, total, right      ACE inhibitors (Hives)  carvedilol (Other)  enalapril (Hives)  Entresto (Other)    Home Medications:  ALPRAZolam 0.5 mg oral tablet: 1 tab(s) orally every 8 hours (2022 09:05)  aspirin 81 mg oral delayed release tablet: 1 tab(s) orally once a day (:26)  DULoxetine 30 mg oral delayed release capsule: 1 cap(s) orally 2 times a day (:25)  Effient 10 mg oral tablet: 1 tab(s) orally once a day (:)  fentaNYL 37.5 mcg/hr transdermal film, extended release: 1 film(s) transdermal every 72 hours (2022 09:05)  furosemide 40 mg oral tablet: 1 tab(s) orally once a day, As Needed (:26)  insulin glargine: 20 unit(s) subcutaneously once a day (2022 09:05)  levothyroxine 25 mcg (0.025 mg) oral tablet: 1 tab(s) orally once a day (2022 09:05)  morphine 15 mg oral tablet: 1 tab(s) orally every 4 hours, As Needed - for severe pain (7 to 10 out of 10) (2022 09:05)  pantoprazole 40 mg oral delayed release tablet: 1 tab(s) orally once a day (2022 09:05)  spironolactone 25 mg oral tablet: 1 tab(s) orally once a day (2022 09:05)    No permtinent family history of PVD    REVIEW OF SYSTEMS:  GENERAL:                                         negative  SKIN:                                                 negative  OPTHALMOLOGIC:                          negative  ENMT:                                               negative  RESPIRATORY AND THORAX:        negative  CARDIOVASCULAR:                         negative  GASTROINTESTINAL:                       negative  NEPHROLOGY:                                  negative  MUSCULOSKELETAL:                       negative  NEUROLOGIC:                                   negative  PSYCHIATRIC:                                    negative  HEMATOLOGY/LYMPHATICS:         negative  ENDOCRINE:                                     negative  ALLERGIC/IMMUNOLOGIC:            negative    12 point ROS otherwise normal except as stated in HPI    PHYSICAL EXAM  Vital Signs Last 24 Hrs  T(C): 36.5 (2022 15:16), Max: 36.8 (2022 08:00)  T(F): 97.7 (2022 15:16), Max: 98.3 (2022 08:00)  HR: 87 (2022 15:16) (76 - 92)  BP: 130/61 (2022 15:16) (100/56 - 130/61)  BP(mean): --  RR: 19 (2022 15:16) (14 - 20)  SpO2: 97% (2022 15:16) (93% - 98%)    Appearance: Normal	  GENERAL: NAD, lying in bed comfortably  HEAD:  Atraumatic, Normocephalic  EYES: EOMI, PERRLA, conjunctiva and sclera clear  ENT: Moist mucous membranes  NECK: Supple, No JVD  CHEST/LUNG: Clear to auscultation bilaterally; No rales, rhonchi, wheezing, or rubs. Unlabored respirations  HEART: Regular rate and rhythm; No murmurs, rubs, or gallops  ABDOMEN: Bowel sounds present; Soft, Nontender, Nondistended.      NERVOUS SYSTEM:  Alert & Oriented X3, speech clear. No deficits     Superficial ulcerations on BL on LE. Swollen R knee minimal serous discharge, no bleeding or purulence.  PT and DP Dopplerable BL. PT LE BL are warm, sensation +, motor+. Edematous, nontense foot BL below the ankle. S/P 4  skin grafts on BL LE.       MEDICATIONS:   MEDICATIONS  (STANDING):  aspirin  chewable 81 milliGRAM(s) Oral daily  atorvastatin 40 milliGRAM(s) Oral at bedtime  dextrose 40% Gel 15 Gram(s) Oral once  dextrose 5%. 1000 milliLiter(s) (50 mL/Hr) IV Continuous <Continuous>  dextrose 5%. 1000 milliLiter(s) (100 mL/Hr) IV Continuous <Continuous>  dextrose 50% Injectable 25 Gram(s) IV Push once  dextrose 50% Injectable 12.5 Gram(s) IV Push once  dextrose 50% Injectable 25 Gram(s) IV Push once  dextrose 50% Injectable 50 milliLiter(s) IV Push once  DULoxetine 30 milliGRAM(s) Oral daily  glucagon  Injectable 1 milliGRAM(s) IntraMuscular once  insulin glargine Injectable (LANTUS) 20 Unit(s) SubCutaneous at bedtime  insulin regular  human recombinant 10 Unit(s) IV Push once  levothyroxine 25 MICROGram(s) Oral daily  pantoprazole    Tablet 40 milliGRAM(s) Oral before breakfast  rifAMPin 300 milliGRAM(s) Oral two times a day  sodium zirconium cyclosilicate 10 Gram(s) Oral two times a day  spironolactone 25 milliGRAM(s) Oral daily  vancomycin  IVPB 1500 milliGRAM(s) IV Intermittent every 12 hours    MEDICATIONS  (PRN):  acetaminophen     Tablet .. 650 milliGRAM(s) Oral every 6 hours PRN Temp greater or equal to 38C (100.4F), Mild Pain (1 - 3)  ALPRAZolam 0.5 milliGRAM(s) Oral every 8 hours PRN anxiety  aluminum hydroxide/magnesium hydroxide/simethicone Suspension 30 milliLiter(s) Oral every 4 hours PRN Dyspepsia  melatonin 3 milliGRAM(s) Oral at bedtime PRN Insomnia  morphine  - Injectable 2 milliGRAM(s) IV Push every 4 hours PRN Moderate Pain (4 - 6)  ondansetron Injectable 4 milliGRAM(s) IV Push every 8 hours PRN Nausea and/or Vomiting      LAB/STUDIES:                        11.5   14.00 )-----------( 216      ( 2022 06:10 )             37.2     -    133<L>  |  92<L>  |  37<H>  ----------------------------<  237<H>  5.7<H>   |  20  |  1.3    Ca    9.6      2022 11:00  Phos  4.2     -  Mg     2.1     -    TPro  6.8  /  Alb  3.4<L>  /  TBili  0.6  /  DBili  x   /  AST  26  /  ALT  25  /  AlkPhos  119<H>  -    PT/INR - ( 2022 05:32 )   PT: 13.70 sec;   INR: 1.19 ratio         PTT - ( 2022 05:32 )  PTT:28.0 sec  LIVER FUNCTIONS - ( 2022 01:20 )  Alb: 3.4 g/dL / Pro: 6.8 g/dL / ALK PHOS: 119 U/L / ALT: 25 U/L / AST: 26 U/L / GGT: x             Urinalysis Basic - ( 2022 08:30 )    Color: Light Yellow / Appearance: Clear / S.032 / pH: x  Gluc: x / Ketone: Trace  / Bili: Negative / Urobili: <2 mg/dL   Blood: x / Protein: 30 mg/dL / Nitrite: Negative   Leuk Esterase: Negative / RBC: 2 /HPF / WBC 2 /HPF   Sq Epi: x / Non Sq Epi: 0 /HPF / Bacteria: Negative    IMAGING:  < from: VA Duplex Lower Ext Vein Scan, Bilat (22 @ 23:42) >  Impression:    No evidence of deep venous thrombosis or superficial thrombophlebitis in   the bilateral lower extremities.  < from: Xray Knee 3 Views, Right (22 @ 04:30) >  FINDINGS/  IMPRESSION:    Subcutaneous air anteriorly at right knee, may be related to recent   aspiration versus soft tissue infection.    Status post right total knee hardware removal with interval placement of    thin rods in the femur and tibia with antibiotic laden cement at femoral   tibial articulations. Unchanged 2-to 3 mm lucency surrounding the cement   and native bone interface. No periprosthetic fracture. Vascular   calcifications.    < from: Xray Tibia + Fibula 2 Views, Right (22 @ 04:31) >  Impression:  No evidence of acute osseous abnormality.      < from: Xray Femur 2 Views, Right (22 @ 04:33) >  FINDINGS/  IMPRESSION:  Please see separate report for evaluation of right knee.  Right femoral gamma nail fixation. Additional fixation daniela of distal   femur.  Right femoral extensive callus formation and heterotopic ossification.  No evidence of acute fracture or dislocation.

## 2022-01-05 NOTE — ED PROVIDER NOTE - PHYSICAL EXAMINATION
Physical Exam    Vital Signs: I have reviewed the initial vital signs.  Constitutional: well-nourished, appears stated age, no acute distress  Eyes: Conjunctiva pink, Sclera clear, PERRLA, EOMI.  Cardiovascular: S1 and S2, regular rate, regular rhythm, well-perfused extremities, radial pulses equal and 2+  Respiratory: unlabored respiratory effort, clear to auscultation bilaterally no wheezing, rales and rhonchi  Gastrointestinal: soft, non-tender abdomen, no pulsatile mass, normal bowl sounds  Musculoskeletal: supple neck, no lower extremity edema, no midline tenderness  Integumentary: warm, dry, R knee moderately swollen, mildly warm, with significant pain with passive flexion, no ecchymosis, brisk cap refill,  Neurologic: awake, alert, cranial nerves II-XII grossly intact, extremities’ motor and sensory functions grossly intact  Psychiatric: appropriate mood, appropriate affect

## 2022-01-05 NOTE — DISCHARGE NOTE PROVIDER - NSDCCPCAREPLAN_GEN_ALL_CORE_FT
PRINCIPAL DISCHARGE DIAGNOSIS  Diagnosis: Knee pain  Assessment and Plan of Treatment: you came in with knee pain  orhtopedic surgery saw you and performed a tap of the knee   there is a concern for infection in your knee   you were started on abx and will continue to take them   the plan is for transfer to [facility] where orthopedic surgery and plastic surgery will collaborate to continue your treatment plan.

## 2022-01-05 NOTE — ED PROVIDER NOTE - CLINICAL SUMMARY MEDICAL DECISION MAKING FREE TEXT BOX
Received patient as transfer from [facility] where he presented for knee pain in setting of known septic arthritis -- patient seen by ortho, tap done by the, abx given, patient to be admitted for washout.

## 2022-01-05 NOTE — ED PROVIDER NOTE - ATTENDING CONTRIBUTION TO CARE
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 Vaughn June 2021, fininished 6wks cefazolin via PICC line, who presents today with worsening R knee pain, swelling, and warmth. He reports knee is exquisitely painful to flexion and is unable to ambulate d/t pain. Denies f/c/n/v.     Gen - NAD, Head - NCAT, Pharynx - clear, MMM, Heart - RRR, no m/g/r, Lungs - CTAB, no w/c/r, Abdomen - soft, NT, ND, Skin - No rash, Extremities - R knee moderately swollen, mildly warm, with significant pain with passive flexion, no ecchymosis, brisk cap refill, Neuro - A&O x3, equal strength and sensation, non-focal exam    a/p  r/o septic joint  pt was called to present to [facility], [place]  spoke to ortho on call- plan was for XR, joint aspiration to r/o infection  XR, analgesia, transfer [place]  Ortho [place] unavailable- [phone]

## 2022-01-05 NOTE — H&P ADULT - NSHPLABSRESULTS_GEN_ALL_CORE
11.5   14.00 )-----------( 216      ( 05 Jan 2022 06:10 )             37.2       01-05    132<L>  |  91<L>  |  38<H>  ----------------------------<  221<H>  4.6   |  21  |  1.3    Ca    9.9      05 Jan 2022 05:32  Phos  4.2     01-05  Mg     2.1     01-05    TPro  6.8  /  Alb  3.4<L>  /  TBili  0.6  /  DBili  x   /  AST  26  /  ALT  25  /  AlkPhos  119<H>  01-05            PT/INR - ( 05 Jan 2022 05:32 )   PT: 13.70 sec;   INR: 1.19 ratio         PTT - ( 05 Jan 2022 05:32 )  PTT:28.0 sec          CAPILLARY BLOOD GLUCOSE

## 2022-01-05 NOTE — DISCHARGE NOTE PROVIDER - NSDCMRMEDTOKEN_GEN_ALL_CORE_FT
ALPRAZolam 0.5 mg oral tablet: 1 tab(s) orally every 8 hours  aspirin 81 mg oral delayed release tablet: 1 tab(s) orally once a day  DULoxetine 30 mg oral delayed release capsule: 1 cap(s) orally 2 times a day  Effient 10 mg oral tablet: 1 tab(s) orally once a day  fentaNYL 37.5 mcg/hr transdermal film, extended release: 1 film(s) transdermal every 72 hours  furosemide 40 mg oral tablet: 1 tab(s) orally once a day, As Needed  insulin glargine: 20 unit(s) subcutaneously once a day  levothyroxine 25 mcg (0.025 mg) oral tablet: 1 tab(s) orally once a day  morphine 15 mg oral tablet: 1 tab(s) orally every 4 hours, As Needed - for severe pain (7 to 10 out of 10)  oxycodone-acetaminophen 5 mg-325 mg oral tablet: 1 tab(s) orally every 8 hours, As Needed -for severe pain MDD:4   pantoprazole 40 mg oral delayed release tablet: 1 tab(s) orally once a day  rosuvastatin 40 mg oral tablet: 1 tab(s) orally once a day (at bedtime)  spironolactone 25 mg oral tablet: 1 tab(s) orally once a day   ALPRAZolam 0.5 mg oral tablet: 1 tab(s) orally every 8 hours  aluminum hydroxide-magnesium hydroxide 200 mg-200 mg/5 mL oral suspension: 30 milliliter(s) orally every 4 hours, As needed, Dyspepsia  aspirin 81 mg oral delayed release tablet: 1 tab(s) orally once a day  DULoxetine 30 mg oral delayed release capsule: 1 cap(s) orally 2 times a day  Effient 10 mg oral tablet: 1 tab(s) orally once a day  furosemide 40 mg oral tablet: 1 tab(s) orally once a day, As Needed  insulin glargine: 20 unit(s) subcutaneously once a day  levothyroxine 25 mcg (0.025 mg) oral tablet: 1 tab(s) orally once a day  morphine: 2 milligram(s) intravenous every 4 hours, As Needed  pantoprazole 40 mg oral delayed release tablet: 1 tab(s) orally once a day  rifAMPin 300 mg oral capsule: 1 cap(s) orally 2 times a day  rosuvastatin 40 mg oral tablet: 1 tab(s) orally once a day (at bedtime)  spironolactone 25 mg oral tablet: 1 tab(s) orally once a day  vancomycin 1.5 g intravenous injection: 1.5 gram(s) intravenous every 12 hours

## 2022-01-05 NOTE — ED ADULT NURSE REASSESSMENT NOTE - NS ED NURSE REASSESS COMMENT FT1
Pt is alert and orientedx3, was a transfer from the [facility] Pt ws seen by Ortho, his right knee was drained with a 50cc needle. Serous sanquine fluid was drown. Pt denies feeling pains during the procedure. Pt was able to sleep and new IV acces was placed with all blood work. Pt did wake up at 6am, requesting pains medication due. Vital signs is stable,  to continue monitoring.

## 2022-01-05 NOTE — DISCHARGE NOTE PROVIDER - HOSPITAL COURSE
67yo Male  with pmh CAD s/p PCI, HFrEF (JOHN PAUL 2/2021: EF 20-25%), HTN, DM, bilateral knee septic arthritis with current right knee antibiotic spacer presents to ED with 1 week of worsening right knee pain and swelling with inability to ambulate for the past 3 days. Patient denies traumatic event. Pain in tight leg, with superficial wounds about the anterior knee and leg with serous drainage and bleeding; no purulence. No recent fever of chills. Patient states that he attempted to aspirate his right knee approximately 3 weeks ago.    Denied any CP, SOB, Fever, cough, GI or urinary symptoms.     In the ED VS stable, WBC 14, s/p Arthrocentesis showing 280k nucleated cells, 376k RBC, R knee xray showed Subcutaneous air anteriorly at right knee, may be related to recent   aspiration versus soft tissue infection. given Zoysn, vancomycin.    seen by ortho planned for right knee irrigation and debridement, possible antibiotic spacer exchange by ortho    seen by ID started on vancomycin        67yo Male  with pmh CAD s/p PCI, HFrEF (JOHN PAUL 2/2021: EF 20-25%), HTN, DM, bilateral knee septic arthritis with current right knee antibiotic spacer presents to ED with 1 week of worsening right knee pain and swelling with inability to ambulate for the past 3 days. Patient denies traumatic event. Pain in tight leg, with superficial wounds about the anterior knee and leg with serous drainage and bleeding; no purulence. No recent fever of chills. Patient states that he attempted to aspirate his right knee approximately 3 weeks ago.    Denied any CP, SOB, Fever, cough, GI or urinary symptoms.     In the ED VS stable, WBC 14, s/p Arthrocentesis showing 280k nucleated cells, 376k RBC, R knee xray showed Subcutaneous air anteriorly at right knee, may be related to recent   aspiration versus soft tissue infection. given Zoysn, vancomycin.    seen by ortho planned for right knee irrigation and debridement, possible antibiotic spacer exchange by ortho    seen by ID started on vancomycin 1.5g q12hr, rifampin 300mg po bid,     plan for transfer to [facility] for OR intervention with orthopedic surgery and plastic surgery.     pt stable for transfer

## 2022-01-05 NOTE — CONSULT NOTE ADULT - ASSESSMENT
ASSESSMENT:   65yo Male  with pmh CAD s/p PCI, HFrEF (JOHN PAUL 2/2021: EF 20-25%), HTN, DM, bilateral knee septic arthritis with current right knee antibiotic spacer presents to ED with 1 week of worsening right knee pain and swelling with inability to ambulate for the past 3 days. Patient denies traumatic event. Pain in tight leg, with BL superficial wounds about the anterior knee and leg with serous drainage and bleeding; no purulence. No recent fever of chills. Patient states that he attempted to aspirate his right knee approximately 3 weeks ago.    Denied any CP, SOB, Fever, cough, GI or urinary symptoms.     In the ED VS stable, WBC 14, s/p Arthrocentesis showing 280k nucleated cells, 376k RBC, R knee xray showed Subcutaneous air anteriorly at right knee, may be related to recent   aspiration versus soft tissue infection. given Zoysn, vancomycin. planned for right knee irrigation and debridement, possible antibiotic spacer exchange by ortho   (05 Jan 2022 07:44)    Vascular team was consulted foBL LE PAD. PT and DP Dopplerable BL. LE BL are warm, sensation +, motor+. BL Swollen R Knee, Nontense, edematous foot BL below the ankle.   Has history of 4 skin grafts for unhealing LE wounds. Pt is a poor historian, admits that he was followed by vascular surgeon, but can't recall his name.         PLAN:   - Bilateral Arterial Duplex of lower extremities  -   -   -     - Patient seen/examined, plan Discussed with Fellow, Dr. MAYO  - Plan to be discussed with Attending, Dr. CA    
ASSESSMENT  65yo Male  with pmh CAD s/p PCI, HFrEF (JOHN PAUL 2/2021: EF 20-25%), HTN, DM, bilateral knee septic arthritis with current right knee antibiotic spacer presents to ED with 1 week of worsening right knee pain and swelling with inability to ambulate for the past 3 days.     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  #Hyponatremia   #Hx COVID19  COVID-19 PCR: NotDetec (01-05-22 @ 04:53)  COVID-19 PCR: Detected (04-29-21 @ 14:33)  COVID-19 PCR: Detected (02-05-21 @ 13:47)  #Obesity BMI (kg/m2): 34.3  #DM     Creatinine, Serum: 1.3 (01-05-22 @ 05:32)    Weight (kg): 117.9 (01-04-22 @ 22:54)    RECOMMENDATIONS  - Vanc 1.5 q12h IV  - Please check vanc trough 30 min prior to 4th dose  - f/u GPC  - D/C Ceftriaxone as GP on G/S  - D/C flagyl   - ADD rifampin 300mg BID PO   - Ortho following  - ESR, CRP     If any questions, please call or send a message on [facility] Sponsorship Teams  Please continue to update ID with any pertinent new laboratory or radiographic findings  Spectra [id number]  
67yo Male  with pmh CAD s/p PCI, HFrEF (JOHN PAUL 2/2021: EF 20-25%), HTN, DM, bilateral knee septic arthritis with current right knee antibiotic spacer presents to ED with 1 week of worsening right knee pain    R knee possible septic arthritis  Hyperkalemia  CAD s/p PCI to RCA and OM1, s/p LIMA to LAD (2018)   Severe ischemic cardiomyopathy w/ reduced EF s/p CRT-D   HTN   DLD   PVD    - Well compensated CHF. METS < 4  - Cont Aspirin 81mg, can hold Effient for procedure but restart as soon as cleared by Ortho  - Hold Spironolactone in setting of hyperkalemia  - Pt is Intermediate risk for Moderate risk procedure

## 2022-01-05 NOTE — PATIENT PROFILE ADULT - FALL HARM RISK - HARM RISK INTERVENTIONS
Assistance with ambulation/Assistance OOB with selected safe patient handling equipment/Communicate Risk of Fall with Harm to all staff/Discuss with provider need for PT consult/Monitor gait and stability/Reinforce activity limits and safety measures with patient and family/Tailored Fall Risk Interventions/Visual Cue: Yellow wristband and red socks/Bed in lowest position, wheels locked, appropriate side rails in place/Call bell, personal items and telephone in reach/Instruct patient to call for assistance before getting out of bed or chair/Non-slip footwear when patient is out of bed/[place] to call system/Physically safe environment - no spills, clutter or unnecessary equipment/Purposeful Proactive Rounding/Room/bathroom lighting operational, light cord in reach

## 2022-01-05 NOTE — ED PROVIDER NOTE - NS ED ROS FT
Constitutional: (-) fever  Eyes/ENT: (-) blurry vision, (-) epistaxis  Cardiovascular: (-) chest pain, (-) syncope  Respiratory: (-) cough, (-) shortness of breath  Gastrointestinal: (-) vomiting, (-) diarrhea  Musculoskeletal: (-) neck pain, (-) back pain, (+) joint pain  Integumentary: +ifnectio  Neurological: (-) headache, (-) altered mental status  Psychiatric: (-) hallucinations  Allergic/Immunologic: (-) pruritus

## 2022-01-05 NOTE — DISCHARGE NOTE PROVIDER - NSDCCAREPROVSEEN_GEN_ALL_CORE_FT
[facility] medicine team, [facility] infectious disease, [facility] orthopedic surgery, [facility] vascular surgery

## 2022-01-05 NOTE — H&P ADULT - ASSESSMENT
65yo Male  with pmh CAD s/p PCI, HFrEF (JOHN PAUL 2/2021: EF 20-25%), HTN, DM, bilateral knee septic arthritis with current right knee antibiotic spacer presents to ED with 1 week of worsening right knee pain and swelling with inability to ambulate for the past 3 days    #Septic arthritis of R knee ?  ESR, CRP  s/p arthrocentesis in the ED showing 280k WBC,  f/u synovial fluid labs:  gram stain, culture, crystals  R knee xray showed Subcutaneous air anteriorly at right knee  Diet: NPO  IV Abx, vancomycin, cefepime, flagyl for now  NWB RLE  Medical clearance to be done by attending  Plan for right knee I&D possible antibiotic spacer exchange  F/u orho  ID consult  Patient does not want to be admitted to [facility], orthopedic surgery informed and will follow up      # PVD  c/w ASA and high intensity statin   Hold Effient for possible I&D    # DM2   - Lantus 20u in the morning       #Microcytic Anemia   - F/u iron studies    #CAD s/p PCI to RCA and OM1, s/p LIMA to LAD (2018)   #Severe ischemic cardiomyopathy w/ reduced EF s/p CRT-D   #HTN   #DLD   - JOHN PAUL 5/2021 w/ ef 20-25%  - on ASA and Effient, statin  - c/w GDMT (, Aldactone, , Lasix prn ) for HFrEF       Activity as tolerated   Diet NPO  DVT PPX Heprain subq after surgery  GI ppx PPI  Dispo from home  Code full    Med rec confirmed with patient at bedside      65yo Male  with pmh CAD s/p PCI, HFrEF (JOHN PAUL 2/2021: EF 20-25%), HTN, DM, bilateral knee septic arthritis with current right knee antibiotic spacer presents to ED with 1 week of worsening right knee pain and swelling with inability to ambulate for the past 3 days    #Septic arthritis of R knee ?  ESR, CRP  s/p arthrocentesis in the ED showing 280k WBC,  f/u synovial fluid labs:  gram stain, culture, crystals  R knee xray showed Subcutaneous air anteriorly at right knee  Diet: NPO  IV Abx, vancomycin, cefepime, flagyl for now  NWB RLE  Medical clearance to be done by attending  Plan for right knee I&D possible antibiotic spacer exchange  F/u orho  ID consult  Patient does not want to be admitted to [facility], orthopedic surgery informed and will follow up      # PVD  c/w ASA and high intensity statin   Hold Effient for possible I&D    # DM2   - for now patient is NPO, will start basal insulin         #CAD s/p PCI to RCA and OM1, s/p LIMA to LAD (2018)   #Severe ischemic cardiomyopathy w/ reduced EF s/p CRT-D   #HTN   #DLD   - JOHN PAUL 5/2021 w/ ef 20-25%  - on ASA and Effient, statin  - c/w GDMT (, Aldactone, , Lasix prn ( hold now for I&D )      Activity as tolerated   Diet NPO  DVT PPX Heprain subq after surgery  GI ppx PPI  Dispo from home  Code full    Med rec confirmed with patient at bedside, asked patient to obtain list please confirm      67yo Male  with pmh CAD s/p PCI, HFrEF (JOHN PAUL 2/2021: EF 20-25%), HTN, DM, bilateral knee septic arthritis with current right knee antibiotic spacer presents to ED with 1 week of worsening right knee pain and swelling with inability to ambulate for the past 3 days    #Septic arthritis of R knee ?  ESR, CRP  s/p arthrocentesis in the ED showing 280k WBC,  f/u synovial fluid labs:  gram stain, culture, crystals  R knee xray showed Subcutaneous air anteriorly at right knee  Diet: NPO  IV Abx, vancomycin, cefepime, flagyl for now  NWB RLE  Medical clearance to be done by attending  Plan for right knee I&D possible antibiotic spacer exchange  F/u orho  ID consult  Patient does not want to be admitted to [facility], orthopedic surgery informed and will follow up    #Hypothyroid  - C/w levothyroxine     # PVD  c/w ASA and high intensity statin   Hold Effient for possible I&D    # DM2   - for now patient is NPO, will start basal insulin         #CAD s/p PCI to RCA and OM1, s/p LIMA to LAD (2018)   #Severe ischemic cardiomyopathy w/ reduced EF s/p CRT-D   #HTN   #DLD   - JOHN PAUL 5/2021 w/ ef 20-25%  - on ASA and Effient, statin  - c/w GDMT (, Aldactone, , Lasix prn ( hold now for I&D )      Activity as tolerated   Diet NPO  DVT PPX Heprain subq after surgery  GI ppx PPI  Dispo from home  Code full    Med rec confirmed with patient at bedside, asked patient to obtain list please confirm

## 2022-01-05 NOTE — H&P ADULT - NSHPPHYSICALEXAM_GEN_ALL_CORE
VITALS:     GENERAL: NAD, lying in bed comfortably  HEAD:  Atraumatic, Normocephalic  EYES: EOMI, PERRLA, conjunctiva and sclera clear  ENT: Moist mucous membranes  NECK: Supple, No JVD  CHEST/LUNG: Clear to auscultation bilaterally; No rales, rhonchi, wheezing, or rubs. Unlabored respirations  HEART: Regular rate and rhythm; No murmurs, rubs, or gallops  ABDOMEN: Bowel sounds present; Soft, Nontender, Nondistended.   EXTREMITIES:    NERVOUS SYSTEM:  Alert & Oriented X3, speech clear. No deficits   MSK: RLE  Superficial ulcerations anterior superior knee and anterior leg, minimal serous discharge, no bleeding or purulence, Swelling/erythema , ROM limited by pain, pain with short arc knee ROM  SKIN: No rashes or lesions

## 2022-01-05 NOTE — ED ADULT NURSE NOTE - NSIMPLEMENTINTERV_GEN_ALL_ED
Implemented All Fall Risk Interventions:  [place] to call system. Call bell, personal items and telephone within reach. Instruct patient to call for assistance. Room bathroom lighting operational. Non-slip footwear when patient is off stretcher. Physically safe environment: no spills, clutter or unnecessary equipment. Stretcher in lowest position, wheels locked, appropriate side rails in place. Provide visual cue, wrist band, yellow gown, etc. Monitor gait and stability. Monitor for mental status changes and reorient to person, place, and time. Review medications for side effects contributing to fall risk. Reinforce activity limits and safety measures with patient and family.

## 2022-01-05 NOTE — ED PROVIDER NOTE - OBJECTIVE STATEMENT
65yo Male  with past medical history of Coronary Artery Disease status post PCI, HFrEF (JOHN PAUL 2/2021: EF 20-25%), HTN, DM, L knee septic arthritis with MSSA bacteremia s/p left knee washout with Dr. Vaughn June 2021, fininished 6wks cefazolin via PICC line presents to ED with 1 week of worsening right knee pain and swelling with inability to ambulate for the past 3 days. Patient denies traumatic event. He reports knee is exquisitely painful to flexion and is unable to ambulate d/t pain. Denies f/c/n/v.

## 2022-01-05 NOTE — H&P ADULT - HISTORY OF PRESENT ILLNESS
65yo Male  with pmh CAD s/p PCI, HFrEF (JOHN PAUL 2/2021: EF 20-25%), HTN, DM, bilateral knee septic arthritis with current right knee antibiotic spacer presents to ED with 1 week of worsening right knee pain and swelling with inability to ambulate for the past 3 days. Patient denies traumatic event. Pain in tight leg, with superficial wounds about the anterior knee and leg with serous drainage and bleeding; no purulence. No recent fever of chills. Patient states that he attempted to aspirate his right knee approximately 3 weeks ago.    Denied any CP, SOB, Fever, cough, GI or urinary symptoms.     In the ED VS stable, WBC 14, s/p Arthrocentesis showing 280k nucleated cells, 376k RBC, R knee xray showed Subcutaneous air anteriorly at right knee, may be related to recent   aspiration versus soft tissue infection. given Zoysn, vancomycin. planned for right knee irrigation and debridement, possible antibiotic spacer exchange by ortho

## 2022-01-05 NOTE — H&P ADULT - ATTENDING COMMENTS
Patient examined in ED-3. In mild pain and asking for his morphine.    PHYSICAL EXAM:    GENERAL: A&O x3,  in mild pain at rest.  NECK: No Swelling.   CHEST/LUNG: Good air entry, No wheezing, No crackles (ant ausc)  HEART: RRR, No audible murmurs  ABDOMEN: Soft, Nontender  EXTREMITIES:  No clubbing, chronic erythema around LEs, R-knee swelling and scarring, red, swollen and tender to palpation.     < from: TTE Echo Complete w/o Contrast w/ Doppler (04.27.21 @ 06:44) >    Summary:   1. Severely decreased global left ventricular systolic function.   2. LV Ejection Fraction by Wilkerson's Method with a biplane EF of 25 %.   3. Global cardiomyopathy.   4. Normal left ventricular internal cavity size.   5. Mildly enlarged left atrium.   6. Normal right atrial size.   7. There is no evidence of pericardial effusion.   8. Mild thickening of the anterior and posterior mitral valve leaflets.   9. Mild to moderate mitral valve regurgitation.  10. Mild-moderate tricuspid regurgitation.  11. Sclerotic aortic valve with normal opening.  12. Pulmonary hypertension is present.    < from: 12 Lead ECG (01.05.22 @ 00:17) >    Atrial-sensed ventricular-paced rhythm at 71 bpm         < from: Xray Chest 1 View-PORTABLE IMMEDIATE (Xray Chest 1 View-PORTABLE IMMEDIATE .) (01.05.22 @ 04:30) >    No radiographic evidence of acute cardiopulmonary disease.                             11.5   14.00 )-----------( 216      ( 05 Jan 2022 06:10 )             37.2   01-05    133<L>  |  92<L>  |  37<H>  ----------------------------<  237<H>  5.7<H>   |  20  |  1.3    Ca    9.6      05 Jan 2022 11:00  Phos  4.2     01-05  Mg     2.1     01-05    TPro  6.8  /  Alb  3.4<L>  /  TBili  0.6  /  DBili  x   /  AST  26  /  ALT  25  /  AlkPhos  119<H>  01-05    A/P  Agree with above detailed assessment and plan of care; with addition:   Preop eval: Patient is known with significant systolic dysfunction; however, appears euvolemic and without acute CHF. He remains however at high risk for fluid overload and acute lung edema; especially if large volume infusion (blood products, fluids) is needed.   According to the ACS NSQIP Surgical Risk Calculator (https://riskcalculator.facs.org/RiskCalculator/), he is above average for serious complications (10%), cardiac complications (1.1%), and kidney failure (1.5%). There is no indication for further cardiac work-up at this time as it is unlikely to change current management. Note that patient is on Dual Anti-Platelet Therapy and statins. Patient understands all above.   Discussed with patient's outpatient cardiologist and consult request placed for post-op evaluation. May proceed at current risk.

## 2022-01-05 NOTE — DISCHARGE NOTE PROVIDER - PROVIDER TOKENS
PROVIDER:[TOKEN:[[id number]],FOLLOWUP:[1 week]],PROVIDER:[TOKEN:[[id number]],FOLLOWUP:[1 week]]

## 2022-01-05 NOTE — PRE PROCEDURE NOTE - PRE PROCEDURE EVALUATION
ORTHOPEDIC SURGERY PRE OP NOTE      Diagnosis: right knee septic arthritis    Planned Procedure: right knee irrigation and debridement, possible antibiotic spacer exchange    Consent: TO BE OBTAINED BY ATTENDING                   Risks/benefits/alternatives were discussed with the patient/family and they wish to proceed with surgery.       ANTICIPATED DATE OF PROCEDURE : 1/5/2022  SCHEDULED CASE OR ADD-ON CASE: add on    Clearances:   [ ] Medicine:   [ ] Cardiology:    T(C): 36.7 (01-05-22 @ 03:10), Max: 36.7 (01-05-22 @ 03:10)  HR: 87 (01-05-22 @ 03:10) (76 - 87)  BP: 100/56 (01-05-22 @ 03:10) (100/56 - 102/51)  RR: 17 (01-05-22 @ 03:10) (14 - 17)  SpO2: 97% (01-05-22 @ 03:10) (96% - 97%)    Labs:                        11.5   12.89 )-----------( 206      ( 05 Jan 2022 01:20 )             37.9     01-05    132<L>  |  91<L>  |  38<H>  ----------------------------<  221<H>  4.6   |  21  |  1.3    Ca    9.9      05 Jan 2022 05:32  Phos  4.2     01-05  Mg     2.1     01-05    TPro  6.8  /  Alb  3.4<L>  /  TBili  0.6  /  DBili  x   /  AST  26  /  ALT  25  /  AlkPhos  119<H>  01-05    PT/INR - ( 05 Jan 2022 05:32 )   PT: 13.70 sec;   INR: 1.19 ratio         PTT - ( 05 Jan 2022 05:32 )  PTT:28.0 sec  Type and Screen X 2:      [ ]EKG:   [ ]CXR:       DIET: NPO  IVF: per primary team      ANTICOAGULATION STATUS ( include name of anticoagulant) :  [ ] Hold dvt chemoppx                                   A/P: Patient is a 66y y/o Male Pending right knee irrigation and debridement    [ ] -NPO and IVF   [ ]pain control/analgesia prn per primary team   [ ]Incentive Spirometry   [ ]F/U Clearance  [ ]F/U Pending Labs  [ ]Notify Ortho with any questions- spectra [id number]   ORTHOPEDIC SURGERY PRE OP NOTE      Diagnosis: right knee septic arthritis (INFECTED SPACER)    Planned Procedure: right knee irrigation and debridement, possible antibiotic spacer exchange    Consent: TO BE OBTAINED BY ATTENDING                   Risks/benefits/alternatives were discussed with the patient/family and they wish to proceed with surgery.       ANTICIPATED DATE OF PROCEDURE : 1/6/2022  SCHEDULED CASE OR ADD-ON CASE: add on    Clearances:   [ ] Medicine:   [ ] Cardiology:    T(C): 36.7 (01-05-22 @ 03:10), Max: 36.7 (01-05-22 @ 03:10)  HR: 87 (01-05-22 @ 03:10) (76 - 87)  BP: 100/56 (01-05-22 @ 03:10) (100/56 - 102/51)  RR: 17 (01-05-22 @ 03:10) (14 - 17)  SpO2: 97% (01-05-22 @ 03:10) (96% - 97%)    Labs:                        11.5   12.89 )-----------( 206      ( 05 Jan 2022 01:20 )             37.9     01-05    132<L>  |  91<L>  |  38<H>  ----------------------------<  221<H>  4.6   |  21  |  1.3    Ca    9.9      05 Jan 2022 05:32  Phos  4.2     01-05  Mg     2.1     01-05    TPro  6.8  /  Alb  3.4<L>  /  TBili  0.6  /  DBili  x   /  AST  26  /  ALT  25  /  AlkPhos  119<H>  01-05    PT/INR - ( 05 Jan 2022 05:32 )   PT: 13.70 sec;   INR: 1.19 ratio         PTT - ( 05 Jan 2022 05:32 )  PTT:28.0 sec  Type and Screen X 2:      [ ]EKG:   [ ]CXR:       DIET: NPO  IVF: per primary team      ANTICOAGULATION STATUS ( include name of anticoagulant) :  [ ] Hold dvt chemoppx                                   A/P: Patient is a 66y y/o Male Pending right knee irrigation and debridement    [ ] -NPO and IVF   [ ]pain control/analgesia prn per primary team   [ ]Incentive Spirometry   [ ]F/U Clearance  [ ]F/U Pending Labs  [ ]Notify Ortho with any questions- spectra [id number]

## 2022-01-05 NOTE — CONSULT NOTE ADULT - ATTENDING COMMENTS
Pt. was seen/ examined  Labs/ vitals/ XR reviewed    Diagnosis: infected R knee spacer, s/p infected R TKA, s/p spacer-to-spacer exchange, s/p skin graft failure, s/p flap closure  We discussed multiple episodes of infection and multiple surgeries, as well as a very high risk of loosing right leg (undergoing right above knee amputation) due to multiple infections  We discussed three possible plans of treatment: above knee amputation, removal of spacer and fusion with external frame, and spacer exchange, with possible subsequent fusion with internal hardware    Patient initially requested transfer to [facility], but then declined it  Now, after discussion of risks/ benefits/ alternatives he wishes to proceed with transfer  Case was discussed with plastics surgeon, Dr. Bowen who was previously involved in care of the patient  We discussed with patient and Dr. Bowen that in order to safely elevate the previous flap, plastics surgeon's involvement is necessary.    Plan: transfer patient to [facility] under my care  Multidisciplinary team approach: ortho surgery, plastics (Dr. Bowen), infectious disease  After discussion of risks/ benefits/ alternatives patient wishes to proceed with another attempt to save his leg: I&D with antibiotic spacer exchange, with possible subsequent fusion with internal hardware if the infection is controlled  Discussed transfer with medical team, charge nurse  Will follow

## 2022-01-06 ENCOUNTER — INPATIENT (INPATIENT)
Facility: HOSPITAL | Age: 67
LOS: 20 days | Discharge: HOME | DRG: 474 | End: 2022-01-27
Attending: SURGERY | Admitting: ORTHOPAEDIC SURGERY
Payer: COMMERCIAL

## 2022-01-06 VITALS
SYSTOLIC BLOOD PRESSURE: 123 MMHG | DIASTOLIC BLOOD PRESSURE: 65 MMHG | TEMPERATURE: 99 F | OXYGEN SATURATION: 95 % | HEART RATE: 95 BPM | RESPIRATION RATE: 18 BRPM

## 2022-01-06 DIAGNOSIS — Z41.9 ENCOUNTER FOR PROCEDURE FOR PURPOSES OTHER THAN REMEDYING HEALTH STATE, UNSPECIFIED: Chronic | ICD-10-CM

## 2022-01-06 DIAGNOSIS — Z95.810 PRESENCE OF AUTOMATIC (IMPLANTABLE) CARDIAC DEFIBRILLATOR: Chronic | ICD-10-CM

## 2022-01-06 DIAGNOSIS — Z98.890 OTHER SPECIFIED POSTPROCEDURAL STATES: Chronic | ICD-10-CM

## 2022-01-06 DIAGNOSIS — Z95.5 PRESENCE OF CORONARY ANGIOPLASTY IMPLANT AND GRAFT: Chronic | ICD-10-CM

## 2022-01-06 DIAGNOSIS — Z43.4 ENCOUNTER FOR ATTENTION TO OTHER ARTIFICIAL OPENINGS OF DIGESTIVE TRACT: Chronic | ICD-10-CM

## 2022-01-06 DIAGNOSIS — Z95.1 PRESENCE OF AORTOCORONARY BYPASS GRAFT: Chronic | ICD-10-CM

## 2022-01-06 DIAGNOSIS — Z96.641 PRESENCE OF RIGHT ARTIFICIAL HIP JOINT: Chronic | ICD-10-CM

## 2022-01-06 DIAGNOSIS — Z90.89 ACQUIRED ABSENCE OF OTHER ORGANS: Chronic | ICD-10-CM

## 2022-01-06 DIAGNOSIS — Z96.651 PRESENCE OF RIGHT ARTIFICIAL KNEE JOINT: Chronic | ICD-10-CM

## 2022-01-06 LAB
6-ACETYLMORPHINE, UR RESULT: NEGATIVE NG/ML — SIGNIFICANT CHANGE UP
6MAM UR CFM-MCNC: NEGATIVE NG/ML — SIGNIFICANT CHANGE UP
A1C WITH ESTIMATED AVERAGE GLUCOSE RESULT: 9 % — HIGH (ref 4–5.6)
ANION GAP SERPL CALC-SCNC: 14 MMOL/L — SIGNIFICANT CHANGE UP (ref 5–17)
BASOPHILS # BLD AUTO: 0 K/UL — SIGNIFICANT CHANGE UP (ref 0–0.2)
BASOPHILS NFR BLD AUTO: 0 % — SIGNIFICANT CHANGE UP (ref 0–2)
BLD GP AB SCN SERPL QL: NEGATIVE — SIGNIFICANT CHANGE UP
BUN SERPL-MCNC: 29 MG/DL — HIGH (ref 7–23)
CALCIUM SERPL-MCNC: 9.6 MG/DL — SIGNIFICANT CHANGE UP (ref 8.4–10.5)
CHLORIDE SERPL-SCNC: 98 MMOL/L — SIGNIFICANT CHANGE UP (ref 96–108)
CO2 SERPL-SCNC: 22 MMOL/L — SIGNIFICANT CHANGE UP (ref 22–31)
CODEINE UR CFM-MCNC: NEGATIVE NG/ML — SIGNIFICANT CHANGE UP
CODEINE, UR RESULT: NEGATIVE NG/ML — SIGNIFICANT CHANGE UP
CREAT SERPL-MCNC: 0.95 MG/DL — SIGNIFICANT CHANGE UP (ref 0.5–1.3)
EOSINOPHIL # BLD AUTO: 0.47 K/UL — SIGNIFICANT CHANGE UP (ref 0–0.5)
EOSINOPHIL NFR BLD AUTO: 3.5 % — SIGNIFICANT CHANGE UP (ref 0–6)
ESTIMATED AVERAGE GLUCOSE: 212 MG/DL — HIGH (ref 68–114)
GIANT PLATELETS BLD QL SMEAR: PRESENT — SIGNIFICANT CHANGE UP
GLUCOSE SERPL-MCNC: 186 MG/DL — HIGH (ref 70–99)
GRAM STN FLD: SIGNIFICANT CHANGE UP
GRAM STN FLD: SIGNIFICANT CHANGE UP
HCT VFR BLD CALC: 38 % — LOW (ref 39–50)
HGB BLD-MCNC: 11.9 G/DL — LOW (ref 13–17)
HYDROCODONE UR QL CFM: NEGATIVE NG/ML — SIGNIFICANT CHANGE UP
HYDROCODONE, UR RESULT: NEGATIVE NG/ML — SIGNIFICANT CHANGE UP
HYPOCHROMIA BLD QL: SLIGHT — SIGNIFICANT CHANGE UP
LYMPHOCYTES # BLD AUTO: 0.35 K/UL — LOW (ref 1–3.3)
LYMPHOCYTES # BLD AUTO: 2.6 % — LOW (ref 13–44)
MANUAL SMEAR VERIFICATION: SIGNIFICANT CHANGE UP
MCHC RBC-ENTMCNC: 24.4 PG — LOW (ref 27–34)
MCHC RBC-ENTMCNC: 31.3 GM/DL — LOW (ref 32–36)
MCV RBC AUTO: 77.9 FL — LOW (ref 80–100)
METHOD TYPE: SIGNIFICANT CHANGE UP
MONOCYTES # BLD AUTO: 0.93 K/UL — HIGH (ref 0–0.9)
MONOCYTES NFR BLD AUTO: 6.9 % — SIGNIFICANT CHANGE UP (ref 2–14)
MRSA SPEC QL CULT: SIGNIFICANT CHANGE UP
NEUTROPHILS # BLD AUTO: 11.75 K/UL — HIGH (ref 1.8–7.4)
NEUTROPHILS NFR BLD AUTO: 87 % — HIGH (ref 43–77)
PLAT MORPH BLD: ABNORMAL
PLATELET # BLD AUTO: 260 K/UL — SIGNIFICANT CHANGE UP (ref 150–400)
POIKILOCYTOSIS BLD QL AUTO: SLIGHT — SIGNIFICANT CHANGE UP
POLYCHROMASIA BLD QL SMEAR: SLIGHT — SIGNIFICANT CHANGE UP
POTASSIUM SERPL-MCNC: 4.2 MMOL/L — SIGNIFICANT CHANGE UP (ref 3.5–5.3)
POTASSIUM SERPL-SCNC: 4.2 MMOL/L — SIGNIFICANT CHANGE UP (ref 3.5–5.3)
RBC # BLD: 4.88 M/UL — SIGNIFICANT CHANGE UP (ref 4.2–5.8)
RBC # FLD: 17.3 % — HIGH (ref 10.3–14.5)
RBC BLD AUTO: ABNORMAL
RH IG SCN BLD-IMP: POSITIVE — SIGNIFICANT CHANGE UP
SODIUM SERPL-SCNC: 134 MMOL/L — LOW (ref 135–145)
SPECIMEN SOURCE: SIGNIFICANT CHANGE UP
SPHEROCYTES BLD QL SMEAR: SLIGHT — SIGNIFICANT CHANGE UP
WBC # BLD: 13.51 K/UL — HIGH (ref 3.8–10.5)
WBC # FLD AUTO: 13.51 K/UL — HIGH (ref 3.8–10.5)

## 2022-01-06 PROCEDURE — 73700 CT LOWER EXTREMITY W/O DYE: CPT | Mod: 26,RT

## 2022-01-06 PROCEDURE — 73560 X-RAY EXAM OF KNEE 1 OR 2: CPT | Mod: 26,RT

## 2022-01-06 PROCEDURE — 99222 1ST HOSP IP/OBS MODERATE 55: CPT

## 2022-01-06 PROCEDURE — 99223 1ST HOSP IP/OBS HIGH 75: CPT | Mod: GC

## 2022-01-06 DEVICE — PIN TROCAR STEIN 1 END 4X22.9MM: Type: IMPLANTABLE DEVICE | Status: FUNCTIONAL

## 2022-01-06 DEVICE — IMPLANTABLE DEVICE: Type: IMPLANTABLE DEVICE | Status: FUNCTIONAL

## 2022-01-06 RX ORDER — HYDROMORPHONE HYDROCHLORIDE 2 MG/ML
0.5 INJECTION INTRAMUSCULAR; INTRAVENOUS; SUBCUTANEOUS EVERY 4 HOURS
Refills: 0 | Status: DISCONTINUED | OUTPATIENT
Start: 2022-01-06 | End: 2022-01-06

## 2022-01-06 RX ORDER — FUROSEMIDE 40 MG
40 TABLET ORAL DAILY
Refills: 0 | Status: DISCONTINUED | OUTPATIENT
Start: 2022-01-06 | End: 2022-01-14

## 2022-01-06 RX ORDER — POVIDONE-IODINE 5 %
1 AEROSOL (ML) TOPICAL ONCE
Refills: 0 | Status: COMPLETED | OUTPATIENT
Start: 2022-01-06 | End: 2022-01-06

## 2022-01-06 RX ORDER — SODIUM CHLORIDE 9 MG/ML
1000 INJECTION, SOLUTION INTRAVENOUS
Refills: 0 | Status: DISCONTINUED | OUTPATIENT
Start: 2022-01-06 | End: 2022-01-09

## 2022-01-06 RX ORDER — VANCOMYCIN HCL 1 G
9000 VIAL (EA) INTRAVENOUS ONCE
Refills: 0 | Status: DISCONTINUED | OUTPATIENT
Start: 2022-01-06 | End: 2022-01-07

## 2022-01-06 RX ORDER — OXYCODONE HYDROCHLORIDE 5 MG/1
10 TABLET ORAL EVERY 4 HOURS
Refills: 0 | Status: DISCONTINUED | OUTPATIENT
Start: 2022-01-06 | End: 2022-01-06

## 2022-01-06 RX ORDER — VANCOMYCIN HCL 1 G
VIAL (EA) INTRAVENOUS
Refills: 0 | Status: DISCONTINUED | OUTPATIENT
Start: 2022-01-06 | End: 2022-01-07

## 2022-01-06 RX ORDER — CHLORHEXIDINE GLUCONATE 213 G/1000ML
1 SOLUTION TOPICAL EVERY 12 HOURS
Refills: 0 | Status: COMPLETED | OUTPATIENT
Start: 2022-01-06 | End: 2022-01-06

## 2022-01-06 RX ORDER — OXYCODONE HYDROCHLORIDE 5 MG/1
10 TABLET ORAL EVERY 4 HOURS
Refills: 0 | Status: DISCONTINUED | OUTPATIENT
Start: 2022-01-06 | End: 2022-01-10

## 2022-01-06 RX ORDER — TRAMADOL HYDROCHLORIDE 50 MG/1
25 TABLET ORAL EVERY 4 HOURS
Refills: 0 | Status: DISCONTINUED | OUTPATIENT
Start: 2022-01-06 | End: 2022-01-06

## 2022-01-06 RX ORDER — HYDROMORPHONE HYDROCHLORIDE 2 MG/ML
0.5 INJECTION INTRAMUSCULAR; INTRAVENOUS; SUBCUTANEOUS
Refills: 0 | Status: DISCONTINUED | OUTPATIENT
Start: 2022-01-06 | End: 2022-01-10

## 2022-01-06 RX ORDER — CYCLOBENZAPRINE HYDROCHLORIDE 10 MG/1
5 TABLET, FILM COATED ORAL THREE TIMES A DAY
Refills: 0 | Status: DISCONTINUED | OUTPATIENT
Start: 2022-01-06 | End: 2022-01-14

## 2022-01-06 RX ORDER — VANCOMYCIN HCL 1 G
1500 VIAL (EA) INTRAVENOUS EVERY 12 HOURS
Refills: 0 | Status: DISCONTINUED | OUTPATIENT
Start: 2022-01-06 | End: 2022-01-07

## 2022-01-06 RX ORDER — SODIUM CHLORIDE 9 MG/ML
1000 INJECTION, SOLUTION INTRAVENOUS
Refills: 0 | Status: DISCONTINUED | OUTPATIENT
Start: 2022-01-06 | End: 2022-01-27

## 2022-01-06 RX ORDER — DEXTROSE 50 % IN WATER 50 %
15 SYRINGE (ML) INTRAVENOUS ONCE
Refills: 0 | Status: DISCONTINUED | OUTPATIENT
Start: 2022-01-06 | End: 2022-01-27

## 2022-01-06 RX ORDER — HYDROMORPHONE HYDROCHLORIDE 2 MG/ML
1 INJECTION INTRAMUSCULAR; INTRAVENOUS; SUBCUTANEOUS ONCE
Refills: 0 | Status: DISCONTINUED | OUTPATIENT
Start: 2022-01-06 | End: 2022-01-06

## 2022-01-06 RX ORDER — OXYCODONE HYDROCHLORIDE 5 MG/1
5 TABLET ORAL EVERY 4 HOURS
Refills: 0 | Status: DISCONTINUED | OUTPATIENT
Start: 2022-01-06 | End: 2022-01-06

## 2022-01-06 RX ORDER — INSULIN GLARGINE 100 [IU]/ML
20 INJECTION, SOLUTION SUBCUTANEOUS AT BEDTIME
Refills: 0 | Status: DISCONTINUED | OUTPATIENT
Start: 2022-01-06 | End: 2022-01-10

## 2022-01-06 RX ORDER — OXYCODONE HYDROCHLORIDE 5 MG/1
5 TABLET ORAL EVERY 4 HOURS
Refills: 0 | Status: DISCONTINUED | OUTPATIENT
Start: 2022-01-06 | End: 2022-01-10

## 2022-01-06 RX ORDER — DEXTROSE 50 % IN WATER 50 %
25 SYRINGE (ML) INTRAVENOUS ONCE
Refills: 0 | Status: DISCONTINUED | OUTPATIENT
Start: 2022-01-06 | End: 2022-01-27

## 2022-01-06 RX ORDER — ATORVASTATIN CALCIUM 80 MG/1
80 TABLET, FILM COATED ORAL AT BEDTIME
Refills: 0 | Status: DISCONTINUED | OUTPATIENT
Start: 2022-01-06 | End: 2022-01-27

## 2022-01-06 RX ORDER — DULOXETINE HYDROCHLORIDE 30 MG/1
20 CAPSULE, DELAYED RELEASE ORAL DAILY
Refills: 0 | Status: DISCONTINUED | OUTPATIENT
Start: 2022-01-06 | End: 2022-01-16

## 2022-01-06 RX ORDER — GLUCAGON INJECTION, SOLUTION 0.5 MG/.1ML
1 INJECTION, SOLUTION SUBCUTANEOUS ONCE
Refills: 0 | Status: DISCONTINUED | OUTPATIENT
Start: 2022-01-06 | End: 2022-01-27

## 2022-01-06 RX ORDER — DEXTROSE 50 % IN WATER 50 %
12.5 SYRINGE (ML) INTRAVENOUS ONCE
Refills: 0 | Status: DISCONTINUED | OUTPATIENT
Start: 2022-01-06 | End: 2022-01-27

## 2022-01-06 RX ORDER — SPIRONOLACTONE 25 MG/1
25 TABLET, FILM COATED ORAL DAILY
Refills: 0 | Status: DISCONTINUED | OUTPATIENT
Start: 2022-01-06 | End: 2022-01-06

## 2022-01-06 RX ORDER — HYDROMORPHONE HYDROCHLORIDE 2 MG/ML
1 INJECTION INTRAMUSCULAR; INTRAVENOUS; SUBCUTANEOUS EVERY 4 HOURS
Refills: 0 | Status: DISCONTINUED | OUTPATIENT
Start: 2022-01-06 | End: 2022-01-06

## 2022-01-06 RX ORDER — TOBRAMYCIN SULFATE 40 MG/ML
4800 VIAL (ML) INJECTION ONCE
Refills: 0 | Status: DISCONTINUED | OUTPATIENT
Start: 2022-01-06 | End: 2022-01-07

## 2022-01-06 RX ORDER — VANCOMYCIN HCL 1 G
1500 VIAL (EA) INTRAVENOUS ONCE
Refills: 0 | Status: COMPLETED | OUTPATIENT
Start: 2022-01-06 | End: 2022-01-06

## 2022-01-06 RX ORDER — ACETAMINOPHEN 500 MG
1000 TABLET ORAL EVERY 8 HOURS
Refills: 0 | Status: DISCONTINUED | OUTPATIENT
Start: 2022-01-06 | End: 2022-01-10

## 2022-01-06 RX ORDER — INSULIN LISPRO 100/ML
VIAL (ML) SUBCUTANEOUS EVERY 6 HOURS
Refills: 0 | Status: DISCONTINUED | OUTPATIENT
Start: 2022-01-06 | End: 2022-01-12

## 2022-01-06 RX ORDER — LEVOTHYROXINE SODIUM 125 MCG
25 TABLET ORAL DAILY
Refills: 0 | Status: DISCONTINUED | OUTPATIENT
Start: 2022-01-06 | End: 2022-01-14

## 2022-01-06 RX ORDER — ALPRAZOLAM 0.25 MG
0.5 TABLET ORAL EVERY 8 HOURS
Refills: 0 | Status: DISCONTINUED | OUTPATIENT
Start: 2022-01-06 | End: 2022-01-06

## 2022-01-06 RX ORDER — HYDROMORPHONE HYDROCHLORIDE 2 MG/ML
0.5 INJECTION INTRAMUSCULAR; INTRAVENOUS; SUBCUTANEOUS
Refills: 0 | Status: DISCONTINUED | OUTPATIENT
Start: 2022-01-06 | End: 2022-01-06

## 2022-01-06 RX ORDER — PANTOPRAZOLE SODIUM 20 MG/1
40 TABLET, DELAYED RELEASE ORAL
Refills: 0 | Status: DISCONTINUED | OUTPATIENT
Start: 2022-01-06 | End: 2022-01-14

## 2022-01-06 RX ORDER — ENOXAPARIN SODIUM 100 MG/ML
30 INJECTION SUBCUTANEOUS EVERY 12 HOURS
Refills: 0 | Status: DISCONTINUED | OUTPATIENT
Start: 2022-01-07 | End: 2022-01-09

## 2022-01-06 RX ORDER — SODIUM CHLORIDE 9 MG/ML
1000 INJECTION, SOLUTION INTRAVENOUS
Refills: 0 | Status: DISCONTINUED | OUTPATIENT
Start: 2022-01-06 | End: 2022-01-06

## 2022-01-06 RX ADMIN — Medication 1000 MILLIGRAM(S): at 21:54

## 2022-01-06 RX ADMIN — HYDROMORPHONE HYDROCHLORIDE 1 MILLIGRAM(S): 2 INJECTION INTRAMUSCULAR; INTRAVENOUS; SUBCUTANEOUS at 02:14

## 2022-01-06 RX ADMIN — CHLORHEXIDINE GLUCONATE 1 APPLICATION(S): 213 SOLUTION TOPICAL at 05:52

## 2022-01-06 RX ADMIN — Medication 1000 MILLIGRAM(S): at 15:00

## 2022-01-06 RX ADMIN — Medication 40 MILLIGRAM(S): at 06:35

## 2022-01-06 RX ADMIN — Medication 6: at 14:13

## 2022-01-06 RX ADMIN — OXYCODONE HYDROCHLORIDE 10 MILLIGRAM(S): 5 TABLET ORAL at 10:02

## 2022-01-06 RX ADMIN — HYDROMORPHONE HYDROCHLORIDE 1 MILLIGRAM(S): 2 INJECTION INTRAMUSCULAR; INTRAVENOUS; SUBCUTANEOUS at 10:25

## 2022-01-06 RX ADMIN — MORPHINE SULFATE 2 MILLIGRAM(S): 50 CAPSULE, EXTENDED RELEASE ORAL at 00:46

## 2022-01-06 RX ADMIN — DULOXETINE HYDROCHLORIDE 20 MILLIGRAM(S): 30 CAPSULE, DELAYED RELEASE ORAL at 06:35

## 2022-01-06 RX ADMIN — Medication 650 MILLIGRAM(S): at 01:29

## 2022-01-06 RX ADMIN — SODIUM CHLORIDE 60 MILLILITER(S): 9 INJECTION, SOLUTION INTRAVENOUS at 04:08

## 2022-01-06 RX ADMIN — Medication 1 APPLICATION(S): at 07:54

## 2022-01-06 RX ADMIN — ATORVASTATIN CALCIUM 80 MILLIGRAM(S): 80 TABLET, FILM COATED ORAL at 21:54

## 2022-01-06 RX ADMIN — OXYCODONE HYDROCHLORIDE 10 MILLIGRAM(S): 5 TABLET ORAL at 09:02

## 2022-01-06 RX ADMIN — SODIUM CHLORIDE 80 MILLILITER(S): 9 INJECTION, SOLUTION INTRAVENOUS at 22:34

## 2022-01-06 RX ADMIN — OXYCODONE HYDROCHLORIDE 5 MILLIGRAM(S): 5 TABLET ORAL at 13:11

## 2022-01-06 RX ADMIN — INSULIN GLARGINE 20 UNIT(S): 100 INJECTION, SOLUTION SUBCUTANEOUS at 22:50

## 2022-01-06 RX ADMIN — Medication 300 MILLIGRAM(S): at 06:05

## 2022-01-06 RX ADMIN — Medication 1000 MILLIGRAM(S): at 14:00

## 2022-01-06 RX ADMIN — OXYCODONE HYDROCHLORIDE 5 MILLIGRAM(S): 5 TABLET ORAL at 14:11

## 2022-01-06 RX ADMIN — Medication 25 MICROGRAM(S): at 06:35

## 2022-01-06 RX ADMIN — HYDROMORPHONE HYDROCHLORIDE 1 MILLIGRAM(S): 2 INJECTION INTRAMUSCULAR; INTRAVENOUS; SUBCUTANEOUS at 10:10

## 2022-01-06 NOTE — CHART NOTE - NSCHARTNOTEFT_GEN_A_CORE
Patient to be transferred to [facility], but refusing initial EMT arrival due to requested desire for critical care EMT to be present for his transfer  Explained risks, benefits, and alternatives of delaying his transfer, including risk to his limb and his life of delay in receiving surgical care for his right knee infection  Patient aware and acknowledges risk, is in distress but will reconsider being transferred. Notified by nursing unit and by attending physician that patient, who was to be transferred to [facility], to receive limb-saving surgery was refusing transfer at arrival of EMTs on the unit  Patient stated that he was in significant pain, and did not want to be transferred without a critical care EMT present for his transfer  Orthopaedics was not notified when the EMTs arrived on the unit, and EMTs had already left by the time we were notified  Lengthy discussion had with patient at bedside, including explanation of the risks of delaying his transfer and surgical care, including risk to his limb and his life, as he has an infection of the right knee that requires urgent surgical treatment  Patient was aware and acknowledged risk, but following discussion was agreeable to transfer proceeding    EMT arrived on unit, and patient ultimately transported via EMT to [facility] for transfer to [facility] Notified by nursing unit and by attending physician that patient, who was to be transferred to [facility], to receive limb-saving surgery was refusing transfer at arrival of EMTs on the unit  Patient stated that he was in significant pain, and did not want to be transferred without a critical care EMT present for his transfer  Orthopaedics was not notified when the EMTs arrived on the unit, and EMTs had already left by the time we were notified  Lengthy discussion had with patient at bedside, including explanation of the risks of delaying his transfer and surgical care, including risk to his limb and his life, as he has an infection of the right knee that requires urgent surgical treatment  Patient was aware and acknowledged risk, but following discussion was agreeable to transfer proceeding    EMT arrived on unit, and patient ultimately transported via EMT to ambulance for transfer to [facility]            Addendum: JOSEPHINE Vaughn MD  1/6/22 10:28  Notified of the issue by transfer center at 00:50 01/06/22  Issue resolved  Patient transferred

## 2022-01-06 NOTE — H&P ADULT - HISTORY OF PRESENT ILLNESS
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 Castro (explant and abx spacer exchange), transferred here from [facility] for recurrent PJI for repeat spacer exchange and possible flap coverage with Dr. Vaughn/Dr. Bowen. Pt has been experiencing atraumatic worsening R knee pain x 1 month. Notes he went to a procedure center where his knee was aspirated 3 weeks ago. Pt was seen at [facility] yesterday where his knee was aspirated with 283k cell count. WBC 14, ESR 78, +, AVSS. Pt started on vancomycin + rifampin. 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

## 2022-01-06 NOTE — DISCHARGE NOTE NURSING/CASE MANAGEMENT/SOCIAL WORK - PATIENT PORTAL LINK FT
You can access the FollowMyHealth Patient Portal offered by [facility] by registering at the following website: [email or web address]. By joining [employer]’s FollowMyHealth portal, you will also be able to view your health information using other applications (apps) compatible with our system.

## 2022-01-06 NOTE — H&P ADULT - NSHPPHYSICALEXAM_GEN_ALL_CORE
General: AAOx3, NAD  R Leg: knee significantly swollen, warm to touch  3cm open wound over anterior knee without purulence or drainage  Prior incision site well healed  Numerous superficial skin wounds of various chronicity throughout extremity  Motor: Firing TA/GS/EHL, knee locked in extension  SILT globally diminished 2/2 diabetic neuropathy

## 2022-01-06 NOTE — CONSULT NOTE ADULT - SUBJECTIVE AND OBJECTIVE BOX
Pain Management Consult Note - [facility] [phone]    Chief Complaint: right knee pain     HPI: Patient seen and examined today. This is a 65 y/o male PMH of diabetes, CHF, HLD, HTN, STEMI, s/p right knee explant/spacer exchange 0/2020 scheduled for repeat explant, spacer exchange and revision of gastric flap. Patient reports endorsing knee pain for many years, requiring multiple surgeries. Patient reports pain is localized to the knee but reports endorsing numbness that radiates down the right foot. Patient reports pain has increased over the last few weeks. At home, patient reports taking Percocet 7.5-325 mg PO q8h PRN and Ibuprofen. Patient reports being prescribed a Fentanyl patch in the past, however reports he has not used this for a few weeks. Patient appears drowsy this AM, reports feeling tired and not getting any sleep over night.     Pain is __X_ sharp ____dull ___burning ___achy ___ Intensity: ____ mild __X_mod X___severe     Location __X__surgical site ____cervical _____lumbar ____abd ____upper ext___X_lower ext    Worse with _X___activity _X___movement _____physical therapy___ Rest    Improved with _X___medication _X___rest ____physical therapy    ROS: Const:  __N_febrile   Eyes:___ENT:___CV: __N_chest pain  Resp: __N__sob  GI:_N__nausea N___vomiting __N_abd pain _Y__npo ___clears __full diet __bm  :___ Musk: _Y__pain ___spasm  Skin:___ Neuro:  ___Nsedation___confusion___Y numbness ___weakness _N__paresth  Psych:_N_anxiety  Endo:___ Heme:___Allergy:_carvedilol, enalapril, entresto, ACEi________, _Y__all others reviewed and negative    PAST MEDICAL & SURGICAL HISTORY:  Status post percutaneous transluminal coronary angioplasty  2 stents  Atherosclerosis of coronary artery  CAD (coronary artery disease)  Osteoarthritis  HLD (hyperlipidemia)  Blood clot due to device, implant, or graft  was on blood thinners  Diabetes  on insulin pump  HTN (hypertension)  CHF (congestive heart failure)  EF ~ 25%  History of celiac disease  Diverticulitis  STEMI (ST elevation myocardial infarction)  Diabetic neuropathy  Anxiety and depression  Other postprocedural status  Fixation hardware in foot LEFT  Stented coronary artery  10/18 heart attack  INFERIOR WALL MI  S/P CABG x 1  2018  S/P TKR (total knee replacement), right  with infection Mrsa   per pt he was cleared from MRSA infection  Surgery, elective  right knee wound debridement  History of open reduction and internal fixation (ORIF) procedure  H/O shoulder surgery  right  AICD (automatic cardioverter/defibrillator) present  Cholecystostomy care  drain inserted 2020 &amp; removed 4 months ago  History of tonsillectomy  History of hip replacement, total, right    SH: __N_Tobacco   _N__Alcohol                          FH:FAMILY HISTORY:  Family history of heart disease (Mother)  Family history of allergies  daughter    ALPRAZolam 0.5 milliGRAM(s) Oral every 8 hours PRN  aluminum hydroxide/magnesium hydroxide/simethicone Suspension 30 milliLiter(s) Oral every 4 hours PRN  atorvastatin 80 milliGRAM(s) Oral at bedtime  chlorhexidine 2% Cloths 1 Application(s) Topical every 12 hours  dextrose 40% Gel 15 Gram(s) Oral once  dextrose 5%. 1000 milliLiter(s) IV Continuous <Continuous>  dextrose 5%. 1000 milliLiter(s) IV Continuous <Continuous>  dextrose 50% Injectable 25 Gram(s) IV Push once  dextrose 50% Injectable 12.5 Gram(s) IV Push once  dextrose 50% Injectable 25 Gram(s) IV Push once  DULoxetine 20 milliGRAM(s) Oral daily  furosemide    Tablet 40 milliGRAM(s) Oral daily  glucagon  Injectable 1 milliGRAM(s) IntraMuscular once  insulin glargine Injectable (LANTUS) 20 Unit(s) SubCutaneous at bedtime  insulin lispro (ADMELOG) corrective regimen sliding scale   SubCutaneous every 6 hours  lactated ringers. 1000 milliLiter(s) IV Continuous <Continuous>  levothyroxine 25 MICROGram(s) Oral daily  oxyCODONE    IR 5 milliGRAM(s) Oral every 4 hours PRN  pantoprazole    Tablet 40 milliGRAM(s) Oral before breakfast  rifAMPin 300 milliGRAM(s) Oral two times a day  vancomycin  IVPB      vancomycin  IVPB 1500 milliGRAM(s) IV Intermittent every 12 hours      T(C): 36.9 (01-06-22 @ 09:13), Max: 37.1 (01-06-22 @ 03:23)  HR: 92 (01-06-22 @ 09:13) (87 - 95)  BP: 126/67 (01-06-22 @ 09:13) (123/65 - 130/61)  RR: 15 (01-06-22 @ 09:13) (15 - 20)  SpO2: 97% (01-06-22 @ 09:13) (95% - 97%)  Wt(kg): --    T(C): 36.9 (01-06-22 @ 09:13), Max: 37.1 (01-06-22 @ 03:23)  HR: 92 (01-06-22 @ 09:13) (87 - 95)  BP: 126/67 (01-06-22 @ 09:13) (123/65 - 130/61)  RR: 15 (01-06-22 @ 09:13) (15 - 20)  SpO2: 97% (01-06-22 @ 09:13) (95% - 97%)  Wt(kg): --    T(C): 36.9 (01-06-22 @ 09:13), Max: 37.1 (01-06-22 @ 03:23)  HR: 92 (01-06-22 @ 09:13) (87 - 95)  BP: 126/67 (01-06-22 @ 09:13) (123/65 - 130/61)  RR: 15 (01-06-22 @ 09:13) (15 - 20)  SpO2: 97% (01-06-22 @ 09:13) (95% - 97%)  Wt(kg): --    PHYSICAL EXAM:  Gen Appearance: __X_no acute distress __X_appropriate        Neuro: ___SILT feet____ EOM Intact Psych: AAOX_3_, _X__mood/affect appropriate        Eyes: _X__conjunctiva WNL  ___X__ Pupils equal and round        ENT: __X_ears and nose atraumatic__X_ Hearing grossly intact        Neck: _X__trachea midline, no visible masses ___thyroid without palpable mass    Resp: __X_Nml WOB____No tactile fremitus ___clear to auscultation    Cardio: __X_extremities free from edema ____pedal pulses palpable    GI/Abdomen: ___soft _____ Nontender____X__Nondistended_____HSM    Lymphatic: ___no palpable nodes in neck  ___no palpable nodes calves and feet    Skin/Wound: ___Incision, ___Dressing c/d/i,   ____surrounding tissues soft,  ___drain/chest tube present____    Muscular: EHL ___/5  Gastrocnemius___/5    __X_absent clubbing/cyanosis      ASSESSMENT: This is a 66y old Male with a history of diabetes, CHF, HLD, HTN, STEMI, s/p right knee explant/spacer exchange 0/2020 scheduled for repeat explant, spacer exchange and revision of gastric flap.     Recommended Treatment PLAN:  1. Discontinue Dilaudid 0.5 mg IVP q3h PRN breakthrough pain. Discontinue Oxycodone 10 mg PO q4h PRN severe pain  2. Oxycodone 5 mg PO q4h PRN severe pain  3. Start Tylenol 1000 mg PO TID  4. Start Flexeril 5 mg PO q4h PRN muscle spasm  5. Post-operatively, can start patient on Oxycodone 5-10 mg PO q4h PRN moderate-severe pain, Dilaudid 0.5 mg IVP q3h PRN breakthrough pain  Pt seen and examined by Dr. Max at PM rounds               Pain Management Consult Note - [facility] [phone]    Chief Complaint: right knee pain     HPI: Patient seen and examined today. This is a 65 y/o male PMH of diabetes, CHF, HLD, HTN, STEMI, s/p right knee explant/spacer exchange 0/2020 scheduled for repeat explant, spacer exchange and revision of gastric flap. Patient reports endorsing knee pain for many years, requiring multiple surgeries. Patient reports pain is localized to the knee but reports endorsing numbness in the right toes. Patient reports pain has increased over the last few weeks. At home, patient reports taking Percocet 7.5-325 mg PO q8h PRN and Ibuprofen. Patient reports being prescribed a Fentanyl patch in the past, however reports he has not used this for a few weeks. Patient appears drowsy this AM, reports feeling tired and not getting any sleep over night.     Pain is __X_ sharp ____dull ___burning ___achy ___ Intensity: ____ mild __X_mod X___severe     Location __X__surgical site ____cervical _____lumbar ____abd ____upper ext___X_lower ext    Worse with _X___activity _X___movement _____physical therapy___ Rest    Improved with _X___medication _X___rest ____physical therapy    ROS: Const:  __N_febrile   Eyes:___ENT:___CV: __N_chest pain  Resp: __N__sob  GI:_N__nausea N___vomiting __N_abd pain _Y__npo ___clears __full diet __bm  :___ Musk: _Y__pain ___spasm  Skin:___ Neuro:  ___Nsedation___confusion___Y numbness ___weakness _N__paresth  Psych:_N_anxiety  Endo:___ Heme:___Allergy:_carvedilol, enalapril, entresto, ACEi________, _Y__all others reviewed and negative    PAST MEDICAL & SURGICAL HISTORY:  Status post percutaneous transluminal coronary angioplasty  2 stents  Atherosclerosis of coronary artery  CAD (coronary artery disease)  Osteoarthritis  HLD (hyperlipidemia)  Blood clot due to device, implant, or graft  was on blood thinners  Diabetes  on insulin pump  HTN (hypertension)  CHF (congestive heart failure)  EF ~ 25%  History of celiac disease  Diverticulitis  STEMI (ST elevation myocardial infarction)  Diabetic neuropathy  Anxiety and depression  Other postprocedural status  Fixation hardware in foot LEFT  Stented coronary artery  10/18 heart attack  INFERIOR WALL MI  S/P CABG x 1  2018  S/P TKR (total knee replacement), right  with infection Mrsa   per pt he was cleared from MRSA infection  Surgery, elective  right knee wound debridement  History of open reduction and internal fixation (ORIF) procedure  H/O shoulder surgery  right  AICD (automatic cardioverter/defibrillator) present  Cholecystostomy care  drain inserted 2020 &amp; removed 4 months ago  History of tonsillectomy  History of hip replacement, total, right    SH: __N_Tobacco   _N__Alcohol                          FH:FAMILY HISTORY:  Family history of heart disease (Mother)  Family history of allergies  daughter    ALPRAZolam 0.5 milliGRAM(s) Oral every 8 hours PRN  aluminum hydroxide/magnesium hydroxide/simethicone Suspension 30 milliLiter(s) Oral every 4 hours PRN  atorvastatin 80 milliGRAM(s) Oral at bedtime  chlorhexidine 2% Cloths 1 Application(s) Topical every 12 hours  dextrose 40% Gel 15 Gram(s) Oral once  dextrose 5%. 1000 milliLiter(s) IV Continuous <Continuous>  dextrose 5%. 1000 milliLiter(s) IV Continuous <Continuous>  dextrose 50% Injectable 25 Gram(s) IV Push once  dextrose 50% Injectable 12.5 Gram(s) IV Push once  dextrose 50% Injectable 25 Gram(s) IV Push once  DULoxetine 20 milliGRAM(s) Oral daily  furosemide    Tablet 40 milliGRAM(s) Oral daily  glucagon  Injectable 1 milliGRAM(s) IntraMuscular once  insulin glargine Injectable (LANTUS) 20 Unit(s) SubCutaneous at bedtime  insulin lispro (ADMELOG) corrective regimen sliding scale   SubCutaneous every 6 hours  lactated ringers. 1000 milliLiter(s) IV Continuous <Continuous>  levothyroxine 25 MICROGram(s) Oral daily  oxyCODONE    IR 5 milliGRAM(s) Oral every 4 hours PRN  pantoprazole    Tablet 40 milliGRAM(s) Oral before breakfast  rifAMPin 300 milliGRAM(s) Oral two times a day  vancomycin  IVPB      vancomycin  IVPB 1500 milliGRAM(s) IV Intermittent every 12 hours      T(C): 36.9 (01-06-22 @ 09:13), Max: 37.1 (01-06-22 @ 03:23)  HR: 92 (01-06-22 @ 09:13) (87 - 95)  BP: 126/67 (01-06-22 @ 09:13) (123/65 - 130/61)  RR: 15 (01-06-22 @ 09:13) (15 - 20)  SpO2: 97% (01-06-22 @ 09:13) (95% - 97%)  Wt(kg): --    T(C): 36.9 (01-06-22 @ 09:13), Max: 37.1 (01-06-22 @ 03:23)  HR: 92 (01-06-22 @ 09:13) (87 - 95)  BP: 126/67 (01-06-22 @ 09:13) (123/65 - 130/61)  RR: 15 (01-06-22 @ 09:13) (15 - 20)  SpO2: 97% (01-06-22 @ 09:13) (95% - 97%)  Wt(kg): --    T(C): 36.9 (01-06-22 @ 09:13), Max: 37.1 (01-06-22 @ 03:23)  HR: 92 (01-06-22 @ 09:13) (87 - 95)  BP: 126/67 (01-06-22 @ 09:13) (123/65 - 130/61)  RR: 15 (01-06-22 @ 09:13) (15 - 20)  SpO2: 97% (01-06-22 @ 09:13) (95% - 97%)  Wt(kg): --    PHYSICAL EXAM:  Gen Appearance: __X_no acute distress __X_appropriate        Neuro: ___SILT feet____ EOM Intact Psych: AAOX_3_, _X__mood/affect appropriate        Eyes: _X__conjunctiva WNL  ___X__ Pupils equal and round        ENT: __X_ears and nose atraumatic__X_ Hearing grossly intact        Neck: _X__trachea midline, no visible masses ___thyroid without palpable mass    Resp: __X_Nml WOB____No tactile fremitus ___clear to auscultation    Cardio: __X_extremities free from edema ____pedal pulses palpable    GI/Abdomen: ___soft _____ Nontender____X__Nondistended_____HSM    Lymphatic: ___no palpable nodes in neck  ___no palpable nodes calves and feet    Skin/Wound: ___Incision, ___Dressing c/d/i,   ____surrounding tissues soft,  ___drain/chest tube present____    Muscular: EHL ___/5  Gastrocnemius___/5    __X_absent clubbing/cyanosis      ASSESSMENT: This is a 66y old Male with a history of diabetes, CHF, HLD, HTN, STEMI, s/p right knee explant/spacer exchange 0/2020 scheduled for repeat explant, spacer exchange and revision of gastric flap.     Recommended Treatment PLAN:  1. Discontinue Dilaudid 0.5 mg IVP q3h PRN breakthrough pain. Discontinue Oxycodone 10 mg PO q4h PRN severe pain  2. Oxycodone 5 mg PO q4h PRN severe pain  3. Start Tylenol 1000 mg PO TID  4. Start Flexeril 5 mg PO q4h PRN muscle spasm  5. Post-operatively, can start patient on Oxycodone 5-10 mg PO q4h PRN moderate-severe pain, Dilaudid 0.5 mg IVP q3h PRN breakthrough pain  Pt seen and examined by Dr. Max at PM rounds               Pain Management Consult Note - [facility] [phone]    Chief Complaint: right knee pain     HPI: Patient seen and examined today. This is a 67 y/o male PMH of diabetes, CHF, HLD, HTN, STEMI, s/p right knee explant/spacer exchange 0/2020 scheduled for repeat explant, spacer exchange and revision of gastric flap. Patient reports endorsing knee pain for many years, requiring multiple surgeries. Patient reports pain is localized to the knee but reports endorsing numbness in the right toes. Patient reports pain has increased over the last few weeks. At home, patient reports taking Percocet 7.5-325 mg PO q8h PRN and Ibuprofen. Patient reports being prescribed a Fentanyl patch in the past, however reports he has not used this for a few weeks. Patient appears drowsy this AM, reports feeling tired and not getting any sleep over night.     Pain is __X_ sharp ____dull ___burning ___achy ___ Intensity: ____ mild __X_mod X___severe     Location __X__surgical site ____cervical _____lumbar ____abd ____upper ext___X_lower ext    Worse with _X___activity _X___movement _____physical therapy___ Rest    Improved with _X___medication _X___rest ____physical therapy    ROS: Const:  __N_febrile   Eyes:___ENT:___CV: __N_chest pain  Resp: __N__sob  GI:_N__nausea N___vomiting __N_abd pain _Y__npo ___clears __full diet __bm  :___ Musk: _Y__pain ___spasm  Skin:___ Neuro:  ___Nsedation___confusion___Y numbness ___weakness _N__paresth  Psych:_N_anxiety  Endo:___ Heme:___Allergy:_carvedilol, enalapril, entresto, ACEi________, _Y__all others reviewed and negative    PAST MEDICAL & SURGICAL HISTORY:  Status post percutaneous transluminal coronary angioplasty  2 stents  Atherosclerosis of coronary artery  CAD (coronary artery disease)  Osteoarthritis  HLD (hyperlipidemia)  Blood clot due to device, implant, or graft  was on blood thinners  Diabetes  on insulin pump  HTN (hypertension)  CHF (congestive heart failure)  EF ~ 25%  History of celiac disease  Diverticulitis  STEMI (ST elevation myocardial infarction)  Diabetic neuropathy  Anxiety and depression  Other postprocedural status  Fixation hardware in foot LEFT  Stented coronary artery  10/18 heart attack  INFERIOR WALL MI  S/P CABG x 1  2018  S/P TKR (total knee replacement), right  with infection Mrsa   per pt he was cleared from MRSA infection  Surgery, elective  right knee wound debridement  History of open reduction and internal fixation (ORIF) procedure  H/O shoulder surgery  right  AICD (automatic cardioverter/defibrillator) present  Cholecystostomy care  drain inserted 2020 &amp; removed 4 months ago  History of tonsillectomy  History of hip replacement, total, right    SH: __N_Tobacco   _N__Alcohol                          FH:FAMILY HISTORY:  Family history of heart disease (Mother)  Family history of allergies  daughter    ALPRAZolam 0.5 milliGRAM(s) Oral every 8 hours PRN  aluminum hydroxide/magnesium hydroxide/simethicone Suspension 30 milliLiter(s) Oral every 4 hours PRN  atorvastatin 80 milliGRAM(s) Oral at bedtime  chlorhexidine 2% Cloths 1 Application(s) Topical every 12 hours  dextrose 40% Gel 15 Gram(s) Oral once  dextrose 5%. 1000 milliLiter(s) IV Continuous <Continuous>  dextrose 5%. 1000 milliLiter(s) IV Continuous <Continuous>  dextrose 50% Injectable 25 Gram(s) IV Push once  dextrose 50% Injectable 12.5 Gram(s) IV Push once  dextrose 50% Injectable 25 Gram(s) IV Push once  DULoxetine 20 milliGRAM(s) Oral daily  furosemide    Tablet 40 milliGRAM(s) Oral daily  glucagon  Injectable 1 milliGRAM(s) IntraMuscular once  insulin glargine Injectable (LANTUS) 20 Unit(s) SubCutaneous at bedtime  insulin lispro (ADMELOG) corrective regimen sliding scale   SubCutaneous every 6 hours  lactated ringers. 1000 milliLiter(s) IV Continuous <Continuous>  levothyroxine 25 MICROGram(s) Oral daily  oxyCODONE    IR 5 milliGRAM(s) Oral every 4 hours PRN  pantoprazole    Tablet 40 milliGRAM(s) Oral before breakfast  rifAMPin 300 milliGRAM(s) Oral two times a day  vancomycin  IVPB      vancomycin  IVPB 1500 milliGRAM(s) IV Intermittent every 12 hours      T(C): 36.9 (01-06-22 @ 09:13), Max: 37.1 (01-06-22 @ 03:23)  HR: 92 (01-06-22 @ 09:13) (87 - 95)  BP: 126/67 (01-06-22 @ 09:13) (123/65 - 130/61)  RR: 15 (01-06-22 @ 09:13) (15 - 20)  SpO2: 97% (01-06-22 @ 09:13) (95% - 97%)  Wt(kg): --    T(C): 36.9 (01-06-22 @ 09:13), Max: 37.1 (01-06-22 @ 03:23)  HR: 92 (01-06-22 @ 09:13) (87 - 95)  BP: 126/67 (01-06-22 @ 09:13) (123/65 - 130/61)  RR: 15 (01-06-22 @ 09:13) (15 - 20)  SpO2: 97% (01-06-22 @ 09:13) (95% - 97%)  Wt(kg): --    T(C): 36.9 (01-06-22 @ 09:13), Max: 37.1 (01-06-22 @ 03:23)  HR: 92 (01-06-22 @ 09:13) (87 - 95)  BP: 126/67 (01-06-22 @ 09:13) (123/65 - 130/61)  RR: 15 (01-06-22 @ 09:13) (15 - 20)  SpO2: 97% (01-06-22 @ 09:13) (95% - 97%)  Wt(kg): --    PHYSICAL EXAM:  Gen Appearance: __X_no acute distress __X_appropriate        Neuro: ___SILT feet____ EOM Intact Psych: AAOX_3_, _X__mood/affect appropriate        Eyes: _X__conjunctiva WNL  ___X__ Pupils equal and round        ENT: __X_ears and nose atraumatic__X_ Hearing grossly intact        Neck: _X__trachea midline, no visible masses ___thyroid without palpable mass    Resp: __X_Nml WOB____No tactile fremitus ___clear to auscultation    Cardio: __X_extremities free from edema ____pedal pulses palpable    GI/Abdomen: ___soft _____ Nontender____X__Nondistended_____HSM    Lymphatic: ___no palpable nodes in neck  ___no palpable nodes calves and feet    Skin/Wound: ___Incision, ___Dressing c/d/i,   ____surrounding tissues soft,  ___drain/chest tube present____    Muscular: EHL ___/5  Gastrocnemius___/5    __X_absent clubbing/cyanosis

## 2022-01-06 NOTE — PROGRESS NOTE ADULT - SUBJECTIVE AND OBJECTIVE BOX
Orthopaedic Surgery Progress Note    Subjective:     Patient seen and examined with PM NP at bedside. Patient drowsy, complaining that his pain is not controlled. States he is very tired and did not get any sleep last night due to being transferred from [facility] late.     Objective:    Vital Signs Last 24 Hrs  T(C): 36.9 (01-06-22 @ 09:13), Max: 36.9 (01-06-22 @ 09:13)  T(F): 98.4 (01-06-22 @ 09:13), Max: 98.4 (01-06-22 @ 09:13)  HR: 92 (01-06-22 @ 09:13) (92 - 92)  BP: 126/67 (01-06-22 @ 09:13) (126/67 - 126/67)  RR: 15 (01-06-22 @ 09:13) (15 - 15)  SpO2: 97% (01-06-22 @ 09:13) (97% - 97%)  AVSS    PE:  General: Patient alert, tearful, NAD, drowsy   3cm open wound R knee without purulence or drainage  Pulses: palpable DP/PT BLE   Sensation: decreased sensation BLE secondary to diabetic neuropathy   Motor: EHL/FHL/TA/GS 5/5 BLE                           11.9   13.51 )-----------( 260      ( 06 Jan 2022 06:24 )             38.0     01-06    134<L>  |  98  |  29<H>  ----------------------------<  186<H>  4.2   |  22  |  0.95    Ca    9.6      06 Jan 2022 06:24  Phos  4.2     01-05  Mg     2.1     01-05    TPro  7.1  /  Alb  3.4<L>  /  TBili  0.8  /  DBili  x   /  AST  41  /  ALT  27  /  AlkPhos  132<H>  01-05    PT/INR - ( 05 Jan 2022 05:32 )   PT: 13.70 sec;   INR: 1.19 ratio         PTT - ( 05 Jan 2022 05:32 )  PTT:28.0 sec    A/P: 66yMale with PJI R knee, transferred overnight from [facility] for OR today   1. Pain control as needed. Appreciate PM recs.   2. Continue antibiotics. Dr. Ruffin from ID consulted. + blood cultures, repeat today and tomorrow per ID. Appreciate recs.   3. NPO for OR tonight for spacer exchange, washout, plastics closure/flap   4. CT R knee ordered due to lucency on radiographs     Ortho Pager [phone]

## 2022-01-06 NOTE — CONSULT NOTE ADULT - SUBJECTIVE AND OBJECTIVE BOX
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  Patient is followed by Dr. Casillas and was last seen by her in 2021.  At that time he was doing well off abx.  About 4 days PTA, he noticed R knee swelling, erythema and pain, and now unable to ambulate due to severe pain. Denied fever/chills, drainage from R knee, n/v/d, abdominal pain.  He came to [facility] on  and his R knee was tapped by ortho.  Synovial fluid showed 283k WBC, growing staph aureus. WBC 14, ESR 78, .  BCx obtained, now growing MRSA. He was given vanc/RIF and was transferred to [facility] for further management.  He is planned to undergo washout, abx spacer exchange.  ID was consulted for abx rec.  Of note, patient was recommended to undergo b/l AKA by multiple providers however he has been refusing.   Of note, he has cholecystocutaneous fistula as his cholecystectomy surgery kept delayed due to other active medical issues, and previously gall bladder fluid has grown PsA and VRE, which are thought to be colonizer.        PAST MEDICAL & SURGICAL HISTORY:  Status post percutaneous transluminal coronary angioplasty  2 stents    Atherosclerosis of coronary artery  CAD (coronary artery disease)    Osteoarthritis    HLD (hyperlipidemia)    Blood clot due to device, implant, or graft  was on blood thinners    Diabetes  on insulin pump    HTN (hypertension)    CHF (congestive heart failure)  EF ~ 25%    History of celiac disease    Diverticulitis    STEMI (ST elevation myocardial infarction)    Diabetic neuropathy    Anxiety and depression    Other postprocedural status  Fixation hardware in foot LEFT    Stented coronary artery  10/18 heart attack  INFERIOR WALL MI    S/P CABG x 1      S/P TKR (total knee replacement), right  with infection Mrsa   per pt he was cleared from MRSA infection    Surgery, elective  right knee wound debridement    History of open reduction and internal fixation (ORIF) procedure    H/O shoulder surgery  right    AICD (automatic cardioverter/defibrillator) present    Cholecystostomy care  drain inserted  &amp; removed 4 months ago    History of tonsillectomy    History of hip replacement, total, right          Review of Systems:   Constitutional, eyes, ENT, cardiovascular, respiratory, gastrointestinal, genitourinary, integumentary, neurological, psychiatric and heme/lymph are otherwise negative other than noted above       ANTIBIOTICS:  MEDICATIONS  (STANDING):  acetaminophen     Tablet .. 1000 milliGRAM(s) Oral every 8 hours  atorvastatin 80 milliGRAM(s) Oral at bedtime  chlorhexidine 2% Cloths 1 Application(s) Topical every 12 hours  dextrose 40% Gel 15 Gram(s) Oral once  dextrose 5%. 1000 milliLiter(s) (50 mL/Hr) IV Continuous <Continuous>  dextrose 5%. 1000 milliLiter(s) (100 mL/Hr) IV Continuous <Continuous>  dextrose 50% Injectable 25 Gram(s) IV Push once  dextrose 50% Injectable 12.5 Gram(s) IV Push once  dextrose 50% Injectable 25 Gram(s) IV Push once  DULoxetine 20 milliGRAM(s) Oral daily  furosemide    Tablet 40 milliGRAM(s) Oral daily  glucagon  Injectable 1 milliGRAM(s) IntraMuscular once  insulin glargine Injectable (LANTUS) 20 Unit(s) SubCutaneous at bedtime  insulin lispro (ADMELOG) corrective regimen sliding scale   SubCutaneous every 6 hours  lactated ringers. 1000 milliLiter(s) (60 mL/Hr) IV Continuous <Continuous>  levothyroxine 25 MICROGram(s) Oral daily  pantoprazole    Tablet 40 milliGRAM(s) Oral before breakfast  rifAMPin 300 milliGRAM(s) Oral two times a day  vancomycin  IVPB      vancomycin  IVPB 1500 milliGRAM(s) IV Intermittent every 12 hours    MEDICATIONS  (PRN):  ALPRAZolam 0.5 milliGRAM(s) Oral every 8 hours PRN anxiety  aluminum hydroxide/magnesium hydroxide/simethicone Suspension 30 milliLiter(s) Oral every 4 hours PRN Dyspepsia  cyclobenzaprine 5 milliGRAM(s) Oral three times a day PRN Muscle Spasm  oxyCODONE    IR 5 milliGRAM(s) Oral every 4 hours PRN Severe Pain (7 - 10)      Allergies    ACE inhibitors (Hives)  carvedilol (Other)  enalapril (Hives)  Entresto (Other)    Intolerances        SOCIAL HISTORY:    FAMILY HISTORY:  Family history of heart disease (Mother)    Family history of allergies  daughter     no FH leading to current infection    Vital Signs Last 24 Hrs  T(C): 37.3 (2022 14:35), Max: 37.3 (2022 14:35)  T(F): 99.2 (2022 14:35), Max: 99.2 (2022 14:35)  HR: 82 (2022 14:35) (82 - 95)  BP: 127/71 (2022 14:35) (123/65 - 130/61)  BP(mean): --  RR: 16 (2022 14:35) (15 - 19)  SpO2: 94% (2022 14:35) (94% - 97%)    22 @ 07:01  -  22 @ 07:00  --------------------------------------------------------  IN: 400 mL / OUT: 250 mL / NET: 150 mL    22 @ 07:01  -  22 @ 15:29  --------------------------------------------------------  IN: 60 mL / OUT: 0 mL / NET: 60 mL        PHYSICAL EXAM:  Constitutional: alert, NAD  Eyes: the sclera and conjunctiva were normal.   ENT: the ears and nose were normal in appearance.   Neck: the appearance of the neck was normal and the neck was supple.   Pulmonary: no respiratory distress and lungs were clear to auscultation bilaterally.   Heart: heart rate was normal and rhythm regular, normal S1 and S2  Ext: R knee swelling/erythema, ttp, b/l shin with superficial ulcers   Abdomen: normal bowel sounds, soft, non-tender  Neurological: no focal deficits.         LABS:                        11.9   13.51 )-----------( 260      ( 2022 06:24 )             38.0         134<L>  |  98  |  29<H>  ----------------------------<  186<H>  4.2   |  22  |  0.95    Ca    9.6      2022 06:24  Phos  4.2     -  Mg     2.1     -    TPro  7.1  /  Alb  3.4<L>  /  TBili  0.8  /  DBili  x   /  AST  41  /  ALT  27  /  AlkPhos  132<H>  -05    PT/INR - ( 2022 05:32 )   PT: 13.70 sec;   INR: 1.19 ratio         PTT - ( 2022 05:32 )  PTT:28.0 sec  Urinalysis Basic - ( 2022 08:30 )    Color: Light Yellow / Appearance: Clear / S.032 / pH: x  Gluc: x / Ketone: Trace  / Bili: Negative / Urobili: <2 mg/dL   Blood: x / Protein: 30 mg/dL / Nitrite: Negative   Leuk Esterase: Negative / RBC: 2 /HPF / WBC 2 /HPF   Sq Epi: x / Non Sq Epi: 0 /HPF / Bacteria: Negative        MICROBIOLOGY:   BCx ngtd   BCx MRSA   Synovial fluid: staph aureus      RADIOLOGY & ADDITIONAL STUDIES:  CT LE R    IMPRESSION:    Status post resection arthroplasty of the knee with fracture of the   cement surrounding the distal aspect of the femoral daniela. There is also   lucency surrounding the cement spacer, for which some degree of   mechanical loosening cannot be entirely excluded. Mild foci of air within   the joint space could reflect sequelae of recent instrumentation although   correlation with physical examination may be of utility to entirely   exclude superimposed infection, if there is clinical suspicion.    Status post gamma nail fixation of a remote proximal femoral fracture   without hardware complication..   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  Patient is followed by Dr. Casillas and was last seen by her in 2021.  At that time he was doing well off abx.  About 4 days PTA, he noticed R knee swelling, erythema and pain, and now unable to ambulate due to severe pain. Denied fever/chills, drainage from R knee, n/v/d, abdominal pain.  He came to [facility] on  and his R knee was tapped by ortho.  Synovial fluid showed 283k WBC, growing staph aureus. WBC 14, ESR 78, .  BCx obtained, now growing MRSA. He was given vanc/RIF and was transferred to [facility] for further management.  He is planned to undergo washout, abx spacer exchange.  ID was consulted for abx rec.  Of note, patient was recommended to undergo b/l AKA by multiple providers however he has been refusing.   Of note, he has cholecystocutaneous fistula as his cholecystectomy surgery kept delayed due to other active medical issues, and previously gall bladder fluid has grown PsA and VRE, which are thought to be colonizer.        PAST MEDICAL & SURGICAL HISTORY:  Status post percutaneous transluminal coronary angioplasty  2 stents    Atherosclerosis of coronary artery  CAD (coronary artery disease)    Osteoarthritis    HLD (hyperlipidemia)    Blood clot due to device, implant, or graft  was on blood thinners    Diabetes  on insulin pump    HTN (hypertension)    CHF (congestive heart failure)  EF ~ 25%    History of celiac disease    Diverticulitis    STEMI (ST elevation myocardial infarction)    Diabetic neuropathy    Anxiety and depression    Other postprocedural status  Fixation hardware in foot LEFT    Stented coronary artery  10/18 heart attack  INFERIOR WALL MI    S/P CABG x 1      S/P TKR (total knee replacement), right  with infection Mrsa   per pt he was cleared from MRSA infection    Surgery, elective  right knee wound debridement    History of open reduction and internal fixation (ORIF) procedure    H/O shoulder surgery  right    AICD (automatic cardioverter/defibrillator) present    Cholecystostomy care  drain inserted  &amp; removed 4 months ago    History of tonsillectomy    History of hip replacement, total, right          Review of Systems:   Constitutional, eyes, ENT, cardiovascular, respiratory, gastrointestinal, genitourinary, integumentary, neurological, psychiatric and heme/lymph are otherwise negative other than noted above       ANTIBIOTICS:  MEDICATIONS  (STANDING):  acetaminophen     Tablet .. 1000 milliGRAM(s) Oral every 8 hours  atorvastatin 80 milliGRAM(s) Oral at bedtime  chlorhexidine 2% Cloths 1 Application(s) Topical every 12 hours  dextrose 40% Gel 15 Gram(s) Oral once  dextrose 5%. 1000 milliLiter(s) (50 mL/Hr) IV Continuous <Continuous>  dextrose 5%. 1000 milliLiter(s) (100 mL/Hr) IV Continuous <Continuous>  dextrose 50% Injectable 25 Gram(s) IV Push once  dextrose 50% Injectable 12.5 Gram(s) IV Push once  dextrose 50% Injectable 25 Gram(s) IV Push once  DULoxetine 20 milliGRAM(s) Oral daily  furosemide    Tablet 40 milliGRAM(s) Oral daily  glucagon  Injectable 1 milliGRAM(s) IntraMuscular once  insulin glargine Injectable (LANTUS) 20 Unit(s) SubCutaneous at bedtime  insulin lispro (ADMELOG) corrective regimen sliding scale   SubCutaneous every 6 hours  lactated ringers. 1000 milliLiter(s) (60 mL/Hr) IV Continuous <Continuous>  levothyroxine 25 MICROGram(s) Oral daily  pantoprazole    Tablet 40 milliGRAM(s) Oral before breakfast  rifAMPin 300 milliGRAM(s) Oral two times a day  vancomycin  IVPB      vancomycin  IVPB 1500 milliGRAM(s) IV Intermittent every 12 hours    MEDICATIONS  (PRN):  ALPRAZolam 0.5 milliGRAM(s) Oral every 8 hours PRN anxiety  aluminum hydroxide/magnesium hydroxide/simethicone Suspension 30 milliLiter(s) Oral every 4 hours PRN Dyspepsia  cyclobenzaprine 5 milliGRAM(s) Oral three times a day PRN Muscle Spasm  oxyCODONE    IR 5 milliGRAM(s) Oral every 4 hours PRN Severe Pain (7 - 10)      Allergies    ACE inhibitors (Hives)  carvedilol (Other)  enalapril (Hives)  Entresto (Other)    Intolerances        SOCIAL HISTORY:  [place].  Denied toxic habits.     FAMILY HISTORY:  Family history of heart disease (Mother)    Family history of allergies  daughter     no FH leading to current infection    Vital Signs Last 24 Hrs  T(C): 37.3 (2022 14:35), Max: 37.3 (2022 14:35)  T(F): 99.2 (2022 14:35), Max: 99.2 (2022 14:35)  HR: 82 (2022 14:35) (82 - 95)  BP: 127/71 (2022 14:35) (123/65 - 130/61)  BP(mean): --  RR: 16 (2022 14:35) (15 - 19)  SpO2: 94% (2022 14:35) (94% - 97%)    22 @ 07:01  -  22 @ 07:00  --------------------------------------------------------  IN: 400 mL / OUT: 250 mL / NET: 150 mL    22 @ 07:01  -  22 @ 15:29  --------------------------------------------------------  IN: 60 mL / OUT: 0 mL / NET: 60 mL        PHYSICAL EXAM:  Constitutional: alert, NAD  Eyes: the sclera and conjunctiva were normal.   ENT: the ears and nose were normal in appearance.   Neck: the appearance of the neck was normal and the neck was supple.   Pulmonary: no respiratory distress and lungs were clear to auscultation bilaterally.   Heart: heart rate was normal and rhythm regular, normal S1 and S2  Ext: R knee swelling/erythema, ttp, b/l shin with superficial ulcers   Abdomen: normal bowel sounds, soft, non-tender  Neurological: no focal deficits.         LABS:                        11.9   13.51 )-----------( 260      ( 2022 06:24 )             38.0         134<L>  |  98  |  29<H>  ----------------------------<  186<H>  4.2   |  22  |  0.95    Ca    9.6      2022 06:24  Phos  4.2     -  Mg     2.1     -    TPro  7.1  /  Alb  3.4<L>  /  TBili  0.8  /  DBili  x   /  AST  41  /  ALT  27  /  AlkPhos  132<H>  -05    PT/INR - ( 2022 05:32 )   PT: 13.70 sec;   INR: 1.19 ratio         PTT - ( 2022 05:32 )  PTT:28.0 sec  Urinalysis Basic - ( 2022 08:30 )    Color: Light Yellow / Appearance: Clear / S.032 / pH: x  Gluc: x / Ketone: Trace  / Bili: Negative / Urobili: <2 mg/dL   Blood: x / Protein: 30 mg/dL / Nitrite: Negative   Leuk Esterase: Negative / RBC: 2 /HPF / WBC 2 /HPF   Sq Epi: x / Non Sq Epi: 0 /HPF / Bacteria: Negative        MICROBIOLOGY:   BCx ngtd   BCx MRSA   Synovial fluid: staph aureus      RADIOLOGY & ADDITIONAL STUDIES:  CT LE R    IMPRESSION:    Status post resection arthroplasty of the knee with fracture of the   cement surrounding the distal aspect of the femoral daniela. There is also   lucency surrounding the cement spacer, for which some degree of   mechanical loosening cannot be entirely excluded. Mild foci of air within   the joint space could reflect sequelae of recent instrumentation although   correlation with physical examination may be of utility to entirely   exclude superimposed infection, if there is clinical suspicion.    Status post gamma nail fixation of a remote proximal femoral fracture   without hardware complication..

## 2022-01-06 NOTE — CHART NOTE - NSCHARTNOTEFT_GEN_A_CORE
Called by RN that EMS arrived but patient was refusing transfer to [facility]. Spoke with patient who states he is in "too much pain to survive the trip." Patient received 2 mg IV morphine 10 minutes prior to EMS arrival. Offered to give the patient additional pain medicine and explained that given patient's disease severity he is at risk of losing the R leg especially with delaying transport/surgery, but patient adamantly declined saying no pain medication would be enough. Patient is AAOx3 and aware that he will not get his surgery that is scheduled tomorrow. He states "he can get it later."

## 2022-01-06 NOTE — CONSULT NOTE ADULT - ASSESSMENT
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   Plan for I&D with spacer exchange today.    - cont vancomycin 1500mg IV q12h, check trough before 4th dose, goal 15-20  - hold rifampin given bacteremia  - obtain TTE   - obtain gallium scan to r/o ICD infection  - please send OR culture       Team 2 will follow you.  Case d/w primary team.    Marta Ruffin MD, MS  Infectious Disease attending  work cell [phone]   For any questions during evening/weekend/holiday, please page ID on call

## 2022-01-06 NOTE — CONSULT NOTE ADULT - SUBJECTIVE AND OBJECTIVE BOX
Patient is a 66y old  Male who presents with a chief complaint of R Knee Pain (2022 15:36)      HPI:  66M hx of CAD, CHF (EF 20-25%), DM, R TKA several years ago c/b recurrent PJI and revisions, most recently in 2020 by Dr. Castro (explant and abx spacer exchange), transferred here from [facility] for recurrent PJI for repeat spacer exchange and possible flap coverage with Dr. Vaughn/Dr. Bowen. Pt has been experiencing atraumatic worsening R knee pain x 1 month. Notes he went to a procedure center where his knee was aspirated 3 weeks ago. Pt was seen at [facility] yesterday where his knee was aspirated with 283k cell count. WBC 14, ESR 78, +, AVSS. Pt started on vancomycin + rifampin. 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 (2022 06:29)      PAST MEDICAL & SURGICAL HISTORY:  Status post percutaneous transluminal coronary angioplasty  2 stents    Atherosclerosis of coronary artery  CAD (coronary artery disease)    Osteoarthritis    HLD (hyperlipidemia)    Blood clot due to device, implant, or graft  was on blood thinners    Diabetes  on insulin pump    HTN (hypertension)    CHF (congestive heart failure)  EF ~ 25%    History of celiac disease    Diverticulitis    STEMI (ST elevation myocardial infarction)    Diabetic neuropathy    Anxiety and depression    Other postprocedural status  Fixation hardware in foot LEFT    Stented coronary artery  10/18 heart attack  INFERIOR WALL MI    S/P CABG x 1      S/P TKR (total knee replacement), right  with infection Mrsa   per pt he was cleared from MRSA infection    Surgery, elective  right knee wound debridement    History of open reduction and internal fixation (ORIF) procedure    H/O shoulder surgery  right    AICD (automatic cardioverter/defibrillator) present    Cholecystostomy care  drain inserted  &amp; removed 4 months ago    History of tonsillectomy    History of hip replacement, total, right        FAMILY HISTORY:  Family history of heart disease (Mother)    Family history of allergies  daughter        SOCIAL HISTORY:  Smoking Status: [ ] Current, [ ] Former, [x ] Never  Pack Years:    MEDICATIONS:  Pulmonary:    Antimicrobials:  vancomycin  IVPB      vancomycin  IVPB 1500 milliGRAM(s) IV Intermittent every 12 hours    Anticoagulants:    Onc:    GI/:  aluminum hydroxide/magnesium hydroxide/simethicone Suspension 30 milliLiter(s) Oral every 4 hours PRN  pantoprazole    Tablet 40 milliGRAM(s) Oral before breakfast    Endocrine:  atorvastatin 80 milliGRAM(s) Oral at bedtime  dextrose 40% Gel 15 Gram(s) Oral once  dextrose 50% Injectable 25 Gram(s) IV Push once  dextrose 50% Injectable 12.5 Gram(s) IV Push once  dextrose 50% Injectable 25 Gram(s) IV Push once  glucagon  Injectable 1 milliGRAM(s) IntraMuscular once  insulin glargine Injectable (LANTUS) 20 Unit(s) SubCutaneous at bedtime  insulin lispro (ADMELOG) corrective regimen sliding scale   SubCutaneous every 6 hours  levothyroxine 25 MICROGram(s) Oral daily    Cardiac:  furosemide    Tablet 40 milliGRAM(s) Oral daily    Other Medications:  acetaminophen     Tablet .. 1000 milliGRAM(s) Oral every 8 hours  ALPRAZolam 0.5 milliGRAM(s) Oral every 8 hours PRN  chlorhexidine 2% Cloths 1 Application(s) Topical every 12 hours  cyclobenzaprine 5 milliGRAM(s) Oral three times a day PRN  dextrose 5%. 1000 milliLiter(s) IV Continuous <Continuous>  dextrose 5%. 1000 milliLiter(s) IV Continuous <Continuous>  DULoxetine 20 milliGRAM(s) Oral daily  lactated ringers. 1000 milliLiter(s) IV Continuous <Continuous>  oxyCODONE    IR 5 milliGRAM(s) Oral every 4 hours PRN      Allergies    ACE inhibitors (Hives)  carvedilol (Other)  enalapril (Hives)  Entresto (Other)    Intolerances        Review of Systems:   •	General: negative  •	Skin/Breast: negative  •	Ophthalmologic: negative  •	ENMT: negative  •	Respiratory and Thorax: negative  •	Cardiovascular: negative  •	Gastrointestinal: negative  •	Genitourinary: negative  •	Musculoskeletal: negative  •	Neurological: negative  •	Psychiatric: negative  •	Hematology/Lymphatics: negative  •	Endocrine: negative  •	Allergic/Immunologic: negative    Physical Exam:   • Constitutional:	refer to dietitian/nutritionist note  • Eyes:	EOMI; PERRL; no drainage or redness  • ENMT:	No oral lesions; no gross abnormalities  • Neck	No bruits; no thyromegaly or nodules  • Breasts:	not examined  • Back:	No deformity or limitation of movement  • Respiratory:	Breath Sounds equal & clear to percussion & auscultation, no accessory muscle use  • Cardiovascular:	Regular rate & rhythm, normal S1, S2; no murmurs, gallops or rubs; no S3, S4  • Gastrointestinal:	Soft, non-tender, no hepatosplenomegaly, normal bowel sounds  • Genitourinary:	not examined  • Rectal: not examined  • Extremities:	No cyanosis, clubbing or edema  • Vascular:	Equal and normal pulses (carotid, femoral, dorsalis pedis)  • Neurologica:l	not examined  • Skin:	No lesions; no rash  • Lymph Nodes:	No lymphadedenopathy  • Musculoskeletal:	No joint pain, swelling or deformity; no limitation of movement      Vital Signs Last 24 Hrs  T(C): 37.3 (2022 14:35), Max: 37.3 (2022 14:35)  T(F): 99.2 (2022 14:35), Max: 99.2 (2022 14:35)  HR: 82 (2022 14:35) (82 - 95)  BP: 127/71 (2022 14:35) (123/65 - 130/61)  BP(mean): --  RR: 16 (2022 14:35) (15 - 19)  SpO2: 94% (2022 14:35) (94% - 97%)     @ 07: @ 07:00  --------------------------------------------------------  IN: 400 mL / OUT: 250 mL / NET: 150 mL     @ 07: @ 17:18  --------------------------------------------------------  IN: 60 mL / OUT: 0 mL / NET: 60 mL          LABS:      CBC Full  -  ( 2022 06:24 )  WBC Count : 13.51 K/uL  RBC Count : 4.88 M/uL  Hemoglobin : 11.9 g/dL  Hematocrit : 38.0 %  Platelet Count - Automated : 260 K/uL  Mean Cell Volume : 77.9 fl  Mean Cell Hemoglobin : 24.4 pg  Mean Cell Hemoglobin Concentration : 31.3 gm/dL  Auto Neutrophil # : 11.75 K/uL  Auto Lymphocyte # : 0.35 K/uL  Auto Monocyte # : 0.93 K/uL  Auto Eosinophil # : 0.47 K/uL  Auto Basophil # : 0.00 K/uL  Auto Neutrophil % : 87.0 %  Auto Lymphocyte % : 2.6 %  Auto Monocyte % : 6.9 %  Auto Eosinophil % : 3.5 %  Auto Basophil % : 0.0 %        134<L>  |  98  |  29<H>  ----------------------------<  186<H>  4.2   |  22  |  0.95    Ca    9.6      2022 06:24  Phos  4.2       Mg     2.1         TPro  7.1  /  Alb  3.4<L>  /  TBili  0.8  /  DBili  x   /  AST  41  /  ALT  27  /  AlkPhos  132<H>      PT/INR - ( 2022 05:32 )   PT: 13.70 sec;   INR: 1.19 ratio         PTT - ( 2022 05:32 )  PTT:28.0 sec      Urinalysis Basic - ( 2022 08:30 )    Color: Light Yellow / Appearance: Clear / S.032 / pH: x  Gluc: x / Ketone: Trace  / Bili: Negative / Urobili: <2 mg/dL   Blood: x / Protein: 30 mg/dL / Nitrite: Negative   Leuk Esterase: Negative / RBC: 2 /HPF / WBC 2 /HPF   Sq Epi: x / Non Sq Epi: 0 /HPF / Bacteria: Negative      < from: Xray Chest 1 View- PORTABLE-Urgent (Xray Chest 1 View- PORTABLE-Urgent .) (22 @ 18:04) >  ACC: [id number] EXAM:  XR CHEST PORTABLE URGENT 1V                          PROCEDURE DATE:  2022          INTERPRETATION:  Clinical History / Reason for exam: Preop.    Comparison : Chest radiograph 2022.    Technique/Positioning: Low lung volume.    Findings:    Support devices: Left-sided permanent pacemaker.    Cardiac/mediastinum/hilum: Magnified.    Lung parenchyma/Pleura: Within normal limits.    Skeleton/soft tissues: Stable.    Impression:    Low lung volume.    No acuteinfiltrates.    Left-sided permanent pacemaker.    < end of copied text >            RADIOLOGY & ADDITIONAL STUDIES (The following images were personally reviewed):

## 2022-01-06 NOTE — H&P ADULT - ASSESSMENT
66M hx of CAD, CHF, DM2 with recurrent R PJI for explant and spacer exchange and plastics flap coverage with Dr. Vaughn/Andrés 1/6  - Transferred to [email or web address] from [facility]  - NPO for OR  - Cell count 283k from [facility]  - Medical and cardiac clearance obtained at [facility]  - COVID neg 1/5 at [facility]  - CT RLE w/o contrast today  - Continue home meds, holding spironolactone in setting of recent hyperkalemia  - ID consult  - Gentle IVF as pt has CHF  - Vancomycin 1500 q12 and Rifampin started at [facility], next vanco trough 1/7 w/AM labs  - Active T&S, 2U PRBC on hold  - Added on, consented    Vanessa Lombardi, PGY-2  Orthopaedic Surgery

## 2022-01-06 NOTE — PATIENT PROFILE ADULT - FALL HARM RISK - HARM RISK INTERVENTIONS
Assistance with ambulation/Assistance OOB with selected safe patient handling equipment/Communicate Risk of Fall with Harm to all staff/Discuss with provider need for PT consult/Monitor gait and stability/Reinforce activity limits and safety measures with patient and family/Tailored Fall Risk Interventions/Visual Cue: Yellow wristband and red socks/Bed in lowest position, wheels locked, appropriate side rails in place/Call bell, personal items and telephone in reach/Instruct patient to call for assistance before getting out of bed or chair/Non-slip footwear when patient is out of bed/Bond to call system/Physically safe environment - no spills, clutter or unnecessary equipment/Purposeful Proactive Rounding/Room/bathroom lighting operational, light cord in reach

## 2022-01-06 NOTE — H&P ADULT - ATTENDING COMMENTS
Pt. seen/ examined  Side/ site marked  Discussed risks/ benefits/ alternatives of the procedure  Consent obtained  Proceeding to OR

## 2022-01-06 NOTE — PROGRESS NOTE ADULT - SUBJECTIVE AND OBJECTIVE BOX
Ortho Post Op Check    Procedure: Repeat explant, spacer exchange and revision gastroc flap   Surgeon: Dr. JOSEPHINE Vaughn    Subjective:  Pain controlled with medication.  Denies CP, SOB, N/V, numbness/tingling.    Objective:  Vital Signs Last 24 Hrs  T(C): 36.3 (01-06-22 @ 22:40), Max: 36.3 (01-06-22 @ 22:40)  T(F): 97.3 (01-06-22 @ 22:40), Max: 97.3 (01-06-22 @ 22:40)  HR: 84 (01-06-22 @ 22:40) (84 - 101)  BP: 126/69 (01-06-22 @ 22:40) (104/59 - 143/59)  BP(mean): 80 (01-06-22 @ 22:18) (77 - 88)  RR: 18 (01-06-22 @ 22:40) (12 - 23)  SpO2: 94% (01-06-22 @ 22:40) (93% - 97%)  AVSS    General: Pt Alert and oriented, NAD  RLE:  Dressing C/D/I, KI in place  Motor: EHL/FHL/TA/GS firing  Sensation: SILT throughout all nerve distributions  Pulses: Toes WWP    A/P: 66yMale s/p repeat explant, spacer exchange and revision gastroc flap on 01-06.  - Stable  - Pain Control  - DVT ppx: SCDs, Lovenox 30mg bid  - Post op abx: tobramycin 4800mg IM x1, vancomycin 9000mg x1, vancomycin 1500mg q12h  - Resume home meds  - PT, WBS: WBAT    Ortho Pager [phone]

## 2022-01-06 NOTE — PROVIDER CONTACT NOTE (CRITICAL VALUE NOTIFICATION) - SITUATION
Growth in aerobic bottle - gram positive cocci in cluster    body fluid - numerous polymorphonuclear leukocytes per low power field numerous gram positive cocci in clusters per oil power field Growth in aerobic bottle - gram positive cocci in cluster    body fluid - numerous polymorphonuclear leukocytes per low power field numerous gram positive cocci in clusters per oil power field    it was collected 1/5

## 2022-01-06 NOTE — CONSULT NOTE ADULT - PROBLEM SELECTOR RECOMMENDATION 10
The patient's medical condition is optimized for surgery.  There is no contraindication for surgery.  There is no clinical evidence neither of angina, decompensated CHF, arrhthymias, nor valvular disease.   There is no limitation of exercise capacity.  MET is 5 .  ASA class is 3.  Arrington cardiac risk factor is high  .  DVT prophylaxis is indicated.  Pain control.  Early mobilization.  Avoid fluid overload.  He was cleared by cardiology

## 2022-01-07 ENCOUNTER — TRANSCRIPTION ENCOUNTER (OUTPATIENT)
Age: 67
End: 2022-01-07

## 2022-01-07 DIAGNOSIS — E11.9 TYPE 2 DIABETES MELLITUS WITHOUT COMPLICATIONS: ICD-10-CM

## 2022-01-07 DIAGNOSIS — E03.9 HYPOTHYROIDISM, UNSPECIFIED: ICD-10-CM

## 2022-01-07 LAB
-  AMPICILLIN/SULBACTAM: SIGNIFICANT CHANGE UP
-  CEFAZOLIN: SIGNIFICANT CHANGE UP
-  CLINDAMYCIN: SIGNIFICANT CHANGE UP
-  DAPTOMYCIN: SIGNIFICANT CHANGE UP
-  ERYTHROMYCIN: SIGNIFICANT CHANGE UP
-  GENTAMICIN: SIGNIFICANT CHANGE UP
-  LINEZOLID: SIGNIFICANT CHANGE UP
-  OXACILLIN: SIGNIFICANT CHANGE UP
-  PENICILLIN: SIGNIFICANT CHANGE UP
-  RIFAMPIN: SIGNIFICANT CHANGE UP
-  TETRACYCLINE: SIGNIFICANT CHANGE UP
-  TRIMETHOPRIM/SULFAMETHOXAZOLE: SIGNIFICANT CHANGE UP
-  VANCOMYCIN: SIGNIFICANT CHANGE UP
ANION GAP SERPL CALC-SCNC: 12 MMOL/L — SIGNIFICANT CHANGE UP (ref 5–17)
BUN SERPL-MCNC: 25 MG/DL — HIGH (ref 7–23)
CALCIUM SERPL-MCNC: 9 MG/DL — SIGNIFICANT CHANGE UP (ref 8.4–10.5)
CHLORIDE SERPL-SCNC: 101 MMOL/L — SIGNIFICANT CHANGE UP (ref 96–108)
CO2 SERPL-SCNC: 24 MMOL/L — SIGNIFICANT CHANGE UP (ref 22–31)
CREAT SERPL-MCNC: 0.9 MG/DL — SIGNIFICANT CHANGE UP (ref 0.5–1.3)
CRP SERPL-MCNC: 292 MG/L — HIGH (ref 0–4)
ERYTHROCYTE [SEDIMENTATION RATE] IN BLOOD: 12 MM/HR — SIGNIFICANT CHANGE UP
GLUCOSE SERPL-MCNC: 144 MG/DL — HIGH (ref 70–99)
GRAM STN FLD: SIGNIFICANT CHANGE UP
HCT VFR BLD CALC: 36.5 % — LOW (ref 39–50)
HGB BLD-MCNC: 11.3 G/DL — LOW (ref 13–17)
HYDROMORPHONE UR QL CFM: 105 NG/ML — SIGNIFICANT CHANGE UP
HYDROMORPHONE, UR RESULT: 105 NG/ML — SIGNIFICANT CHANGE UP
MCHC RBC-ENTMCNC: 24.7 PG — LOW (ref 27–34)
MCHC RBC-ENTMCNC: 31 GM/DL — LOW (ref 32–36)
MCV RBC AUTO: 79.9 FL — LOW (ref 80–100)
METHOD TYPE: SIGNIFICANT CHANGE UP
MORPHINE UR QL CFM: 456 NG/ML — SIGNIFICANT CHANGE UP
MORPHINE, UR RESULT: 456 NG/ML — SIGNIFICANT CHANGE UP
NOROXYCODONE (OPIATES), UR RESULT: 1455 NG/ML — SIGNIFICANT CHANGE UP
NOROXYCODONE UR CFM-MCNC: 1455 NG/ML — SIGNIFICANT CHANGE UP
NRBC # BLD: 0 /100 WBCS — SIGNIFICANT CHANGE UP (ref 0–0)
OPIATES IN-HOUSE INTERPRETATION: POSITIVE
OPIATES UR QL CFM: POSITIVE
OXYCODONE (OPIATES), UR RESULT: 9219 NG/ML — SIGNIFICANT CHANGE UP
OXYCODONE UR-MCNC: 9219 NG/ML — SIGNIFICANT CHANGE UP
OXYMORPHONE (OPIATES), UR RESULT: 325 NG/ML — SIGNIFICANT CHANGE UP
OXYMORPHONE UR CFM-MCNC: 325 NG/ML — SIGNIFICANT CHANGE UP
PLATELET # BLD AUTO: 223 K/UL — SIGNIFICANT CHANGE UP (ref 150–400)
POTASSIUM SERPL-MCNC: 4.2 MMOL/L — SIGNIFICANT CHANGE UP (ref 3.5–5.3)
POTASSIUM SERPL-SCNC: 4.2 MMOL/L — SIGNIFICANT CHANGE UP (ref 3.5–5.3)
RBC # BLD: 4.57 M/UL — SIGNIFICANT CHANGE UP (ref 4.2–5.8)
RBC # FLD: 17.5 % — HIGH (ref 10.3–14.5)
SODIUM SERPL-SCNC: 137 MMOL/L — SIGNIFICANT CHANGE UP (ref 135–145)
VANCOMYCIN TROUGH SERPL-MCNC: 22.3 UG/ML — HIGH (ref 10–20)
WBC # BLD: 15.42 K/UL — HIGH (ref 3.8–10.5)
WBC # FLD AUTO: 15.42 K/UL — HIGH (ref 3.8–10.5)

## 2022-01-07 PROCEDURE — 93306 TTE W/DOPPLER COMPLETE: CPT | Mod: 26

## 2022-01-07 PROCEDURE — 99234 HOSP IP/OBS SM DT SF/LOW 45: CPT | Mod: GC

## 2022-01-07 PROCEDURE — 99232 SBSQ HOSP IP/OBS MODERATE 35: CPT

## 2022-01-07 PROCEDURE — 99222 1ST HOSP IP/OBS MODERATE 55: CPT | Mod: GC

## 2022-01-07 RX ORDER — VANCOMYCIN HCL 1 G
1250 VIAL (EA) INTRAVENOUS EVERY 12 HOURS
Refills: 0 | Status: DISCONTINUED | OUTPATIENT
Start: 2022-01-07 | End: 2022-01-08

## 2022-01-07 RX ORDER — VANCOMYCIN HCL 1 G
1250 VIAL (EA) INTRAVENOUS EVERY 12 HOURS
Refills: 0 | Status: DISCONTINUED | OUTPATIENT
Start: 2022-01-07 | End: 2022-01-07

## 2022-01-07 RX ORDER — VANCOMYCIN HCL 1 G
1250 VIAL (EA) INTRAVENOUS ONCE
Refills: 0 | Status: DISCONTINUED | OUTPATIENT
Start: 2022-01-07 | End: 2022-01-07

## 2022-01-07 RX ADMIN — INSULIN GLARGINE 20 UNIT(S): 100 INJECTION, SOLUTION SUBCUTANEOUS at 22:54

## 2022-01-07 RX ADMIN — PANTOPRAZOLE SODIUM 40 MILLIGRAM(S): 20 TABLET, DELAYED RELEASE ORAL at 06:22

## 2022-01-07 RX ADMIN — DULOXETINE HYDROCHLORIDE 20 MILLIGRAM(S): 30 CAPSULE, DELAYED RELEASE ORAL at 12:47

## 2022-01-07 RX ADMIN — OXYCODONE HYDROCHLORIDE 10 MILLIGRAM(S): 5 TABLET ORAL at 00:01

## 2022-01-07 RX ADMIN — HYDROMORPHONE HYDROCHLORIDE 0.5 MILLIGRAM(S): 2 INJECTION INTRAMUSCULAR; INTRAVENOUS; SUBCUTANEOUS at 22:15

## 2022-01-07 RX ADMIN — HYDROMORPHONE HYDROCHLORIDE 0.5 MILLIGRAM(S): 2 INJECTION INTRAMUSCULAR; INTRAVENOUS; SUBCUTANEOUS at 13:04

## 2022-01-07 RX ADMIN — Medication 25 MICROGRAM(S): at 06:21

## 2022-01-07 RX ADMIN — OXYCODONE HYDROCHLORIDE 10 MILLIGRAM(S): 5 TABLET ORAL at 00:31

## 2022-01-07 RX ADMIN — Medication 166.67 MILLIGRAM(S): at 11:57

## 2022-01-07 RX ADMIN — Medication 1000 MILLIGRAM(S): at 06:21

## 2022-01-07 RX ADMIN — OXYCODONE HYDROCHLORIDE 10 MILLIGRAM(S): 5 TABLET ORAL at 21:10

## 2022-01-07 RX ADMIN — OXYCODONE HYDROCHLORIDE 10 MILLIGRAM(S): 5 TABLET ORAL at 10:17

## 2022-01-07 RX ADMIN — Medication 40 MILLIGRAM(S): at 06:22

## 2022-01-07 RX ADMIN — Medication 1000 MILLIGRAM(S): at 16:39

## 2022-01-07 RX ADMIN — HYDROMORPHONE HYDROCHLORIDE 0.5 MILLIGRAM(S): 2 INJECTION INTRAMUSCULAR; INTRAVENOUS; SUBCUTANEOUS at 21:53

## 2022-01-07 RX ADMIN — HYDROMORPHONE HYDROCHLORIDE 0.5 MILLIGRAM(S): 2 INJECTION INTRAMUSCULAR; INTRAVENOUS; SUBCUTANEOUS at 12:47

## 2022-01-07 RX ADMIN — ATORVASTATIN CALCIUM 80 MILLIGRAM(S): 80 TABLET, FILM COATED ORAL at 22:53

## 2022-01-07 RX ADMIN — Medication 166.67 MILLIGRAM(S): at 23:00

## 2022-01-07 RX ADMIN — OXYCODONE HYDROCHLORIDE 10 MILLIGRAM(S): 5 TABLET ORAL at 10:50

## 2022-01-07 RX ADMIN — Medication 1000 MILLIGRAM(S): at 15:13

## 2022-01-07 RX ADMIN — Medication 1000 MILLIGRAM(S): at 23:20

## 2022-01-07 RX ADMIN — OXYCODONE HYDROCHLORIDE 10 MILLIGRAM(S): 5 TABLET ORAL at 20:48

## 2022-01-07 RX ADMIN — OXYCODONE HYDROCHLORIDE 10 MILLIGRAM(S): 5 TABLET ORAL at 17:10

## 2022-01-07 RX ADMIN — OXYCODONE HYDROCHLORIDE 10 MILLIGRAM(S): 5 TABLET ORAL at 16:40

## 2022-01-07 RX ADMIN — ENOXAPARIN SODIUM 30 MILLIGRAM(S): 100 INJECTION SUBCUTANEOUS at 06:21

## 2022-01-07 RX ADMIN — ENOXAPARIN SODIUM 30 MILLIGRAM(S): 100 INJECTION SUBCUTANEOUS at 18:17

## 2022-01-07 RX ADMIN — Medication 1000 MILLIGRAM(S): at 07:00

## 2022-01-07 RX ADMIN — Medication 1000 MILLIGRAM(S): at 22:53

## 2022-01-07 RX ADMIN — Medication 4: at 00:13

## 2022-01-07 NOTE — CONSULT NOTE ADULT - PROBLEM SELECTOR RECOMMENDATION 9
Please continue lantus       units at night / morning.  Please continue lispro      units before each meal.  Please continue lispro moderate / low dose sliding scale four times daily with meals and at bedtime    Pt's fingerstick glucose goal is 100-180.    Will continue to monitor     For discharge, pt can continue    Pt can follow up at discharge with Dr Schmitt at scheduled appt. 2/8.  Will discuss case with Dr. Luevano    and update primary team Please continue lantus   20    units at night .  Please start lispro   3   units before each meal.  Please continue lispro moderate dose sliding scale four times daily with meals and at bedtime    Pt's fingerstick glucose goal is 100-180.    Will continue to monitor     For discharge, pt can continue    Pt can follow up at discharge with Dr Schmitt at scheduled appt. 2/8.  Will discuss case with Dr. Luevano    and update primary team

## 2022-01-07 NOTE — DISCHARGE NOTE PROVIDER - NSDCFUADDINST_GEN_ALL_CORE_FT
You may only perform "toe touch weightbearing" meaning you cannot bear full weight on your right leg/foot. Use an assistive device while ambulating.  No strenuous activity, heavy lifting, driving or returning to work until cleared by MD.  Try to have regular bowel movements, take stool softener or laxative if necessary.  May take Pepcid or Prilosec for upset stomach.  May take Aleve or Naproxen.  Do not apply any creams or ointments on the incision or around the incision/dressing.  Swelling may travel all the way down leg to foot, this is normal and will subside in a few weeks.  Call to schedule an appt with Dr. Vaughn for follow up, if you have staples or sutures they will be removed in office.  Contact your doctor if you experience: fever greater than 101.5, chills, chest pain, difficulty breathing, redness or excessive drainage around the incision, other concerns.  Follow up with your primary care provider.  You may only perform "toe touch weightbearing" meaning you cannot bear full weight on your right leg/foot. Use an assistive device while ambulating.  No strenuous activity, heavy lifting, driving or returning to work until cleared by MD.  Try to have regular bowel movements, take stool softener or laxative if necessary.  May take Pepcid or Prilosec for upset stomach.  May take Aleve or Naproxen.  Do not apply any creams or ointments on the incision or around the incision/dressing.  Swelling may travel all the way down leg to foot, this is normal and will subside in a few weeks.  Call to schedule an appt with Dr. Vauhgn for follow up, if you have staples or sutures they will be removed in office.  Contact your doctor if you experience: fever greater than 101.5, chills, chest pain, difficulty breathing, redness or excessive drainage around the incision, other concerns.  Follow up with your primary care provider.  You were seen by endocrinology during your admission - please follow up with Dr. Schmitt at your scheduled appointment on 2/8/22.  FOLLOW UP: Dr. Malloy in 1 week. Your appointment has been made for _______. Call the office at  with any questions...    WOUND CARE: You may shower; soap and water over incision sites. Do not scrub. Pat dry when done.     ACTIVITY: Please Ambulate on left leg with assistance, no heavy lifting (>10lbs) or strenuous exercise....    DIET:   You may resume a consistent carbohydrate diabetic diet.   Call the office if you experience increasing pain, redness, swelling or drainage from incision sites/wounds, or temperature >101.4F.     NEW MEDICATIONS:  ADDITIONAL FOLLOW UP AFTER DISCHARGE: Endocrinology: please follow up with Dr. Schmitt at your scheduled appointment on 2/8/22. Please also follow up with your primary care provider   DISCHARGE DESTINATION: Acute Rehab    Try to have regular bowel movements, take stool softener or laxative if necessary.  May take Pepcid or Prilosec for upset stomach.  May take Aleve or Naproxen.  Do not apply any creams or ointments on the incision or around the incision/dressing.  Contact your doctor if you experience: fever greater than 101.5, chills, chest pain, difficulty breathing, redness or excessive drainage around the incision, other concerns.  .  FOLLOW UP: Dr. Malloy in 1 week. Your appointment has been made for _______. Call the office at  with any questions...    WOUND CARE: R AKA: Xeroform over incision staples, wrap with kerlix and ace wrap.     ACTIVITY: Please Ambulate on left leg with assistance, no heavy lifting (>10lbs) or strenuous exercise....    DIET:   You may resume a consistent carbohydrate diabetic diet.   Call the office if you experience increasing pain, redness, swelling or drainage from incision sites/wounds, or temperature >101.4F.     NEW MEDICATIONS:  ADDITIONAL FOLLOW UP AFTER DISCHARGE: Endocrinology: please follow up with Dr. Schmitt at your scheduled appointment on 2/8/22. Please also follow up with your primary care provider   DISCHARGE DESTINATION: Acute Rehab    Try to have regular bowel movements, take stool softener or laxative if necessary.  May take Pepcid or Prilosec for upset stomach.  May take Aleve or Naproxen.  Do not apply any creams or ointments on the incision or around the incision/dressing.  Contact your doctor if you experience: fever greater than 101.5, chills, chest pain, difficulty breathing, redness or excessive drainage around the incision, other concerns.  .  FOLLOW UP: Dr. Malloy in 1 week. Your appointment has been made for 2/4/22. Call the office at  with any questions...    WOUND CARE: R AKA: Xeroform over incision staples, wrap with kerlix and ace wrap.     ACTIVITY: Please Ambulate on left leg with assistance, no heavy lifting (>10lbs) or strenuous exercise....    DIET:   You may resume a consistent carbohydrate diabetic diet.   Call the office if you experience increasing pain, redness, swelling or drainage from incision sites/wounds, or temperature >101.4F.     NEW MEDICATIONS:   Daptomycin  Prasugrel  Lantus 28   Lispro 8   Cyclobenzaprine  Metoprolol 25 mg  Gabapentin 300 TID  Oxycodone 5mg [id number] for moderate to severe pain  Oxycodone 10mg [id number] for moderate to severe pain    ADDITIONAL FOLLOW UP AFTER DISCHARGE: Endocrinology: please follow up with Dr. Schmitt at your scheduled appointment on 2/8/22. Please also follow up with your primary care provider   DISCHARGE DESTINATION: Acute Rehab    Try to have regular bowel movements, take stool softener or laxative if necessary.  May take Pepcid or Prilosec for upset stomach.  May take Aleve or Naproxen.  Do not apply any creams or ointments on the incision or around the incision/dressing.  Contact your doctor if you experience: fever greater than 101.5, chills, chest pain, difficulty breathing, redness or excessive drainage around the incision, other concerns.  .  FOLLOW UP: Dr. Malloy in 1 week. Your appointment has been made for 2/4/22. Call the office at  with any questions...    WOUND CARE: R AKA: Xeroform over incision staples, wrap with kerlix and ace wrap.     ACTIVITY: Please Ambulate on left leg with assistance, no heavy lifting (>10lbs) or strenuous exercise....    DIET:   You may resume a consistent carbohydrate diabetic diet.   Call the office if you experience increasing pain, redness, swelling or drainage from incision sites/wounds, or temperature >101.4F.     NEW MEDICATIONS:   Daptomycin IV until 2/7/22  Prasugrel  Lantus 28   Lispro 8   Cyclobenzaprine  Metoprolol 25 mg  Gabapentin 300 TID  Oxycodone 5mg [id number] for moderate to severe pain  Oxycodone 10mg [id number] for moderate to severe pain    ADDITIONAL FOLLOW UP AFTER DISCHARGE: Endocrinology: please follow up with Dr. Schmitt at your scheduled appointment on 2/8/22. Please also follow up with your primary care provider   DISCHARGE DESTINATION: Acute Rehab    Try to have regular bowel movements, take stool softener or laxative if necessary.  May take Pepcid or Prilosec for upset stomach.  May take Aleve or Naproxen.  Do not apply any creams or ointments on the incision or around the incision/dressing.  Contact your doctor if you experience: fever greater than 101.5, chills, chest pain, difficulty breathing, redness or excessive drainage around the incision, other concerns.  .

## 2022-01-07 NOTE — CONSULT NOTE ADULT - SUBJECTIVE AND OBJECTIVE BOX
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 Castro (explant and abx spacer exchange), transferred here from [facility] for recurrent PJI for repeat spacer exchange and possible flap coverage with Dr. Vaughn/Dr. Bowen. Pt has been experiencing atraumatic worsening R knee pain x 1 month. Notes he went to a procedure center where his knee was aspirated 3 weeks ago. Pt was seen at [facility] yesterday where his knee was aspirated with 283k cell count. WBC 14, ESR 78, +, AVSS. Pt started on vancomycin + rifampin. 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 (06 Jan 2022 06:29)      Attending:  Mellissa    Surgery Addendum: 66M above PMx, PSx, Vascular surgery consulted for R BKA 2/2 recurrent septic joint episodes.        PAST MEDICAL & SURGICAL HISTORY:  Status post percutaneous transluminal coronary angioplasty  2 stents    Atherosclerosis of coronary artery  CAD (coronary artery disease)    Osteoarthritis    HLD (hyperlipidemia)    Blood clot due to device, implant, or graft  was on blood thinners    Diabetes  on insulin pump    HTN (hypertension)    CHF (congestive heart failure)  EF ~ 25%    History of celiac disease    Diverticulitis    STEMI (ST elevation myocardial infarction)    Diabetic neuropathy    Anxiety and depression    Preoperative clearance    Other postprocedural status  Fixation hardware in foot LEFT    Stented coronary artery  10/18 heart attack  INFERIOR WALL MI    S/P CABG x 1  2018    S/P TKR (total knee replacement), right  with infection Mrsa   per pt he was cleared from MRSA infection    Surgery, elective  right knee wound debridement    History of open reduction and internal fixation (ORIF) procedure    H/O shoulder surgery  right    AICD (automatic cardioverter/defibrillator) present    Cholecystostomy care  drain inserted 2020 &amp; removed 4 months ago    History of tonsillectomy    History of hip replacement, total, right        MEDICATIONS  (STANDING):  acetaminophen     Tablet .. 1000 milliGRAM(s) Oral every 8 hours  atorvastatin 80 milliGRAM(s) Oral at bedtime  dextrose 40% Gel 15 Gram(s) Oral once  dextrose 5%. 1000 milliLiter(s) (50 mL/Hr) IV Continuous <Continuous>  dextrose 5%. 1000 milliLiter(s) (100 mL/Hr) IV Continuous <Continuous>  dextrose 50% Injectable 25 Gram(s) IV Push once  dextrose 50% Injectable 12.5 Gram(s) IV Push once  dextrose 50% Injectable 25 Gram(s) IV Push once  DULoxetine 20 milliGRAM(s) Oral daily  enoxaparin Injectable 30 milliGRAM(s) SubCutaneous every 12 hours  furosemide    Tablet 40 milliGRAM(s) Oral daily  glucagon  Injectable 1 milliGRAM(s) IntraMuscular once  insulin glargine Injectable (LANTUS) 20 Unit(s) SubCutaneous at bedtime  insulin lispro (ADMELOG) corrective regimen sliding scale   SubCutaneous every 6 hours  lactated ringers. 1000 milliLiter(s) (80 mL/Hr) IV Continuous <Continuous>  levothyroxine 25 MICROGram(s) Oral daily  pantoprazole    Tablet 40 milliGRAM(s) Oral before breakfast  vancomycin  IVPB 1250 milliGRAM(s) IV Intermittent every 12 hours    MEDICATIONS  (PRN):  ALPRAZolam 0.5 milliGRAM(s) Oral every 8 hours PRN anxiety  aluminum hydroxide/magnesium hydroxide/simethicone Suspension 30 milliLiter(s) Oral every 4 hours PRN Dyspepsia  cyclobenzaprine 5 milliGRAM(s) Oral three times a day PRN Muscle Spasm  HYDROmorphone  Injectable 0.5 milliGRAM(s) IV Push every 15 minutes PRN pacu  HYDROmorphone  Injectable 0.5 milliGRAM(s) IV Push every 3 hours PRN breakthru  oxyCODONE    IR 5 milliGRAM(s) Oral every 4 hours PRN Moderate Pain (4 - 6)  oxyCODONE    IR 10 milliGRAM(s) Oral every 4 hours PRN Severe Pain (7 - 10)      Allergies    ACE inhibitors (Hives)  carvedilol (Other)  enalapril (Hives)  Entresto (Other)    Intolerances        SOCIAL HISTORY:    FAMILY HISTORY:  Family history of heart disease (Mother)    Family history of allergies  daughter        Vital Signs Last 24 Hrs  T(C): 36.4 (07 Jan 2022 12:17), Max: 37.1 (07 Jan 2022 09:05)  T(F): 97.6 (07 Jan 2022 12:17), Max: 98.8 (07 Jan 2022 09:05)  HR: 86 (07 Jan 2022 12:17) (78 - 101)  BP: 137/70 (07 Jan 2022 12:17) (104/59 - 143/59)  BP(mean): 80 (06 Jan 2022 22:18) (77 - 88)  RR: 17 (07 Jan 2022 12:17) (12 - 23)  SpO2: 95% (07 Jan 2022 12:17) (93% - 99%)    I&O's Summary    06 Jan 2022 07:01  -  07 Jan 2022 07:00  --------------------------------------------------------  IN: 860 mL / OUT: 800 mL / NET: 60 mL    07 Jan 2022 07:01  -  07 Jan 2022 15:13  --------------------------------------------------------  IN: 160 mL / OUT: 0 mL / NET: 160 mL        Physical Exam:  General: NAD, resting comfortably  HEENT: NC/AT, EOMI, normal hearing, no oral lesions, no LAD, neck supple  Pulmonary: normal resp effort, CTA-B  Cardiovascular: NSR, no murmurs  Abdominal: soft, ND/NT, no organomegaly  Extremities: WWP, LLE w/o clubbing/cyanosis/edema. RLE wrapped, w wound vac w adequate seal, draining brown fluid  Neuro: A/O x 3, CNs II-XII grossly intact, normal sensation, no focal deficits        LABS:                        11.3   15.42 )-----------( 223      ( 07 Jan 2022 07:04 )             36.5     01-07    137  |  101  |  25<H>  ----------------------------<  144<H>  4.2   |  24  |  0.90    Ca    9.0      07 Jan 2022 07:04    TPro  7.1  /  Alb  3.4<L>  /  TBili  0.8  /  DBili  x   /  AST  41  /  ALT  27  /  AlkPhos  132<H>  01-05        CAPILLARY BLOOD GLUCOSE      POCT Blood Glucose.: 135 mg/dL (07 Jan 2022 13:13)  POCT Blood Glucose.: 123 mg/dL (07 Jan 2022 06:17)  POCT Blood Glucose.: 216 mg/dL (07 Jan 2022 00:08)  POCT Blood Glucose.: 196 mg/dL (06 Jan 2022 21:38)    LIVER FUNCTIONS - ( 05 Jan 2022 22:55 )  Alb: 3.4 g/dL / Pro: 7.1 g/dL / ALK PHOS: 132 U/L / ALT: 27 U/L / AST: 41 U/L / GGT: x             Cultures:  Culture Results:   Testing in progress (01-07 @ 00:56)  Culture Results:   No growth to date (01-06 @ 19:13)  Culture Results:   No growth at 1 day. (01-06 @ 12:28)      RADIOLOGY & ADDITIONAL STUDIES:         [facility] Surgery Consult Note    HPI:  66M hx of CAD, CHF (EF 20-25%), DM, 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 Castro (explant and abx spacer exchange), transferred here from [facility] for recurrent PJI for repeat spacer exchange and possible flap coverage with Dr. Vaughn/Dr. Bowen. Pt has been experiencing atraumatic worsening R knee pain x 1 month. Notes he went to a procedure center where his knee was aspirated 3 weeks ago. Pt was seen at [facility] yesterday where his knee was aspirated with 283k cell count. WBC 14, ESR 78, +, AVSS. Pt started on vancomycin + rifampin. 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 (06 Jan 2022 06:29)      Attending:  Mellissa    Surgery Addendum: 66M above PMx, PSx, Vascular surgery consulted for RLE amputation 2/2 recurrent septic joint episodes.        PAST MEDICAL & SURGICAL HISTORY:  Status post percutaneous transluminal coronary angioplasty  2 stents    Atherosclerosis of coronary artery  CAD (coronary artery disease)    Osteoarthritis    HLD (hyperlipidemia)    Blood clot due to device, implant, or graft  was on blood thinners    Diabetes  on insulin pump    HTN (hypertension)    CHF (congestive heart failure)  EF ~ 25%    History of celiac disease    Diverticulitis    STEMI (ST elevation myocardial infarction)    Diabetic neuropathy    Anxiety and depression    Preoperative clearance    Other postprocedural status  Fixation hardware in foot LEFT    Stented coronary artery  10/18 heart attack  INFERIOR WALL MI    S/P CABG x 1  2018    S/P TKR (total knee replacement), right  with infection Mrsa   per pt he was cleared from MRSA infection    Surgery, elective  right knee wound debridement    History of open reduction and internal fixation (ORIF) procedure    H/O shoulder surgery  right    AICD (automatic cardioverter/defibrillator) present    Cholecystostomy care  drain inserted 2020 &amp; removed 4 months ago    History of tonsillectomy    History of hip replacement, total, right        MEDICATIONS  (STANDING):  acetaminophen     Tablet .. 1000 milliGRAM(s) Oral every 8 hours  atorvastatin 80 milliGRAM(s) Oral at bedtime  dextrose 40% Gel 15 Gram(s) Oral once  dextrose 5%. 1000 milliLiter(s) (50 mL/Hr) IV Continuous <Continuous>  dextrose 5%. 1000 milliLiter(s) (100 mL/Hr) IV Continuous <Continuous>  dextrose 50% Injectable 25 Gram(s) IV Push once  dextrose 50% Injectable 12.5 Gram(s) IV Push once  dextrose 50% Injectable 25 Gram(s) IV Push once  DULoxetine 20 milliGRAM(s) Oral daily  enoxaparin Injectable 30 milliGRAM(s) SubCutaneous every 12 hours  furosemide    Tablet 40 milliGRAM(s) Oral daily  glucagon  Injectable 1 milliGRAM(s) IntraMuscular once  insulin glargine Injectable (LANTUS) 20 Unit(s) SubCutaneous at bedtime  insulin lispro (ADMELOG) corrective regimen sliding scale   SubCutaneous every 6 hours  lactated ringers. 1000 milliLiter(s) (80 mL/Hr) IV Continuous <Continuous>  levothyroxine 25 MICROGram(s) Oral daily  pantoprazole    Tablet 40 milliGRAM(s) Oral before breakfast  vancomycin  IVPB 1250 milliGRAM(s) IV Intermittent every 12 hours    MEDICATIONS  (PRN):  ALPRAZolam 0.5 milliGRAM(s) Oral every 8 hours PRN anxiety  aluminum hydroxide/magnesium hydroxide/simethicone Suspension 30 milliLiter(s) Oral every 4 hours PRN Dyspepsia  cyclobenzaprine 5 milliGRAM(s) Oral three times a day PRN Muscle Spasm  HYDROmorphone  Injectable 0.5 milliGRAM(s) IV Push every 15 minutes PRN pacu  HYDROmorphone  Injectable 0.5 milliGRAM(s) IV Push every 3 hours PRN breakthru  oxyCODONE    IR 5 milliGRAM(s) Oral every 4 hours PRN Moderate Pain (4 - 6)  oxyCODONE    IR 10 milliGRAM(s) Oral every 4 hours PRN Severe Pain (7 - 10)      Allergies    ACE inhibitors (Hives)  carvedilol (Other)  enalapril (Hives)  Entresto (Other)    Intolerances        SOCIAL HISTORY:    FAMILY HISTORY:  Family history of heart disease (Mother)    Family history of allergies  daughter        Vital Signs Last 24 Hrs  T(C): 36.4 (07 Jan 2022 12:17), Max: 37.1 (07 Jan 2022 09:05)  T(F): 97.6 (07 Jan 2022 12:17), Max: 98.8 (07 Jan 2022 09:05)  HR: 86 (07 Jan 2022 12:17) (78 - 101)  BP: 137/70 (07 Jan 2022 12:17) (104/59 - 143/59)  BP(mean): 80 (06 Jan 2022 22:18) (77 - 88)  RR: 17 (07 Jan 2022 12:17) (12 - 23)  SpO2: 95% (07 Jan 2022 12:17) (93% - 99%)    I&O's Summary    06 Jan 2022 07:01  -  07 Jan 2022 07:00  --------------------------------------------------------  IN: 860 mL / OUT: 800 mL / NET: 60 mL    07 Jan 2022 07:01  -  07 Jan 2022 15:13  --------------------------------------------------------  IN: 160 mL / OUT: 0 mL / NET: 160 mL        Physical Exam:  General: NAD, resting comfortably  HEENT: NC/AT, EOMI, normal hearing, no oral lesions, no LAD, neck supple  Pulmonary: normal resp effort, CTA-B  Cardiovascular: NSR, no murmurs  Abdominal: soft, ND/NT, no organomegaly  Extremities: WWP, LLE w/o clubbing/cyanosis/edema. RLE wrapped, w wound vac w adequate seal, draining brown fluid  Neuro: A/O x 3, CNs II-XII grossly intact, normal sensation, no focal deficits        LABS:                        11.3   15.42 )-----------( 223      ( 07 Jan 2022 07:04 )             36.5     01-07    137  |  101  |  25<H>  ----------------------------<  144<H>  4.2   |  24  |  0.90    Ca    9.0      07 Jan 2022 07:04    TPro  7.1  /  Alb  3.4<L>  /  TBili  0.8  /  DBili  x   /  AST  41  /  ALT  27  /  AlkPhos  132<H>  01-05        CAPILLARY BLOOD GLUCOSE      POCT Blood Glucose.: 135 mg/dL (07 Jan 2022 13:13)  POCT Blood Glucose.: 123 mg/dL (07 Jan 2022 06:17)  POCT Blood Glucose.: 216 mg/dL (07 Jan 2022 00:08)  POCT Blood Glucose.: 196 mg/dL (06 Jan 2022 21:38)    LIVER FUNCTIONS - ( 05 Jan 2022 22:55 )  Alb: 3.4 g/dL / Pro: 7.1 g/dL / ALK PHOS: 132 U/L / ALT: 27 U/L / AST: 41 U/L / GGT: x             Cultures:  Culture Results:   Testing in progress (01-07 @ 00:56)  Culture Results:   No growth to date (01-06 @ 19:13)  Culture Results:   No growth at 1 day. (01-06 @ 12:28)      RADIOLOGY & ADDITIONAL STUDIES:

## 2022-01-07 NOTE — DISCHARGE NOTE PROVIDER - CARE PROVIDER_API CALL
Moi Vaughn)  Orthopaedic Surgery Surgery  [address] [place] [address]  Phone: [phone]  Fax: [phone]  Follow Up Time:    Moi Vaughn)  Orthopaedic Surgery Surgery  [address] [address]  Phone: [phone]  Fax: [phone]  Follow Up Time:     Noel Malloy)  Surgery; Vascular Surgery  [address] [place] [address]  Phone: [phone]  Fax: [phone]  Follow Up Time:     Matra Ruffin)  Infectious Disease; Internal Medicine  [address], [address] [place] [address]  Phone: [phone]  Fax: [phone]  Follow Up Time:    Moi Vaughn)  Orthopaedic Surgery Surgery  [address] [address]  Phone: [phone]  Fax: [phone]  Follow Up Time:     Noel Malloy)  Surgery; Vascular Surgery  [address] [place] [address]  Phone: [phone]  Fax: [phone]  Scheduled Appointment: 02/04/2022 12:15 PM    Marta Ruffin)  Infectious Disease; Internal Medicine  [address] [place] [address]  Phone: [phone]  Fax: [phone]  Follow Up Time:

## 2022-01-07 NOTE — CONSULT NOTE ADULT - PROBLEM SELECTOR RECOMMENDATION 2
TSH 4-5 past several years, not on therapy. Nov 2021 - TSH 8.08, free T4 0.9; started on levothyroxine 25mcg daily. TSH 4-5 past several years, not on therapy. Nov 2021 - TSH 8.08, free T4 0.9; started on levothyroxine 25mcg daily.  continue levothyroxine 25mcg daily.  f/u TSH

## 2022-01-07 NOTE — CONSULT NOTE ADULT - ATTENDING COMMENTS
ASSESSMENT: This is a 66y old Male with a history of diabetes, CHF, HLD, HTN, STEMI, s/p right knee explant/spacer exchange 0/2020 scheduled for repeat explant, spacer exchange and revision of gastric flap.     Recommended Treatment PLAN:  1. Discontinue Dilaudid 0.5 mg IVP q3h PRN breakthrough pain. Discontinue Oxycodone 10 mg PO q4h PRN severe pain  2. Oxycodone 5 mg PO q4h PRN severe pain  3. Start Tylenol 1000 mg PO TID  4. Start Flexeril 5 mg PO q4h PRN muscle spasm  5. Post-operatively, can start patient on Oxycodone 5-10 mg PO q4h PRN moderate-severe pain, Dilaudid 0.5 mg IVP q3h PRN breakthrough pain  Pt seen and examined by me, NP acted as scribe
Pt seen on rounds this afternoon.  Was somewhat more alert than when seen by Ms Oquendo earlier.  Has type 2 insulin-requiring DM, CAD with previous CABG and marked LV dysfunction, and recurrent prosthetic joint infections R knee.  Now admitted with increased R knee pain and suspected septic arthritis.  (Also with pain in his R foot, which appears mildly swollen and erythematous, though without ulceration)  DM has been managed intermittently with insulin pump, otherwise basal/bolus insulin.  Requirements of the latter during a hospitalization in May were much lower than the basal rates on the pump noted at his outpatient visits  HIs glucoses have been 120-200 since late yesterday with 20 Lantus having been given last night.  PO intake is extremely poor at this point due to pain and effect of opiates  --Would continue the Lantus at 20 units, start a standing premeal dose of only 3 units lispro  --Continue levothyroxine 25 mcg/day for his hypothyroidism.  Will repeat TFTs early next week

## 2022-01-07 NOTE — CONSULT NOTE ADULT - ASSESSMENT
66M hx of CAD, CHF (EF 20-25%), DM, R TKA several years ago c/b recurrent PJI and revisions, vascular surgery consulted for R BKA.     - added on for 1/10  - consent pending   - NPO past MN 1/9,   - pre-op labs 1/10, to include coags   - maintain active T&S, repeat COVID swab 1/9   - discussed w vascular attending.  66M hx of CAD, CHF (EF 20-25%), DM, R TKA several years ago c/b recurrent PJI and revisions, vascular surgery consulted for RLE Amputation    - added on to OR schedule 1/10  - consent pending   - NPO past MN 1/9,   - pre-op labs 1/10, to include coags   - maintain active T&S, repeat COVID swab 1/9   - discussed w vascular attending.

## 2022-01-07 NOTE — PROGRESS NOTE ADULT - SUBJECTIVE AND OBJECTIVE BOX
Orthopaedic Surgery Progress Note    Post-operative day #1 s/p repeat explant, spacer exchange, revision gastroc flap     Subjective:     Patient seen and examined with Dr. Vaughn. Patient comfortable without complaints. Dr. Vaughn discussed at length his findings in the OR and plan going forward.     Objective:    Vital Signs Last 24 Hrs  T(C): 36.4 (01-07-22 @ 12:17), Max: 36.4 (01-07-22 @ 12:17)  T(F): 97.6 (01-07-22 @ 12:17), Max: 97.6 (01-07-22 @ 12:17)  HR: 86 (01-07-22 @ 12:17) (86 - 86)  BP: 137/70 (01-07-22 @ 12:17) (137/70 - 137/70)  BP(mean): --  RR: 17 (01-07-22 @ 12:17) (17 - 17)  SpO2: 95% (01-07-22 @ 12:17) (95% - 95%)  AVSS    PE:  General: Patient alert and oriented, NAD  Dressing: Clean/dry/intact right lower extremity dressing/KI   Moving right foot                             11.3   15.42 )-----------( 223      ( 07 Jan 2022 07:04 )             36.5   01-07    137  |  101  |  25<H>  ----------------------------<  144<H>  4.2   |  24  |  0.90    Ca    9.0      07 Jan 2022 07:04    TPro  7.1  /  Alb  3.4<L>  /  TBili  0.8  /  DBili  x   /  AST  41  /  ALT  27  /  AlkPhos  132<H>  01-05      A/P: 66yMale POD#1 s/p above procedure   1. Pain control as needed  2. DVT prophylaxis: lovenox  3. PT, weight-bearing status:  TTWB in KI   4. appreciate recommendations Dr. Ruffin - per Dr. Ruffin, JOHN PAUL and gallium scan ordered  5. Dr. Malloy vascular surgery consulted     Ortho Pager [phone] Orthopaedic Surgery Progress Note    Post-operative day #1 s/p repeat explant, spacer exchange, revision gastroc flap     Subjective:     Patient seen and examined with Dr. Vaughn. Patient comfortable without complaints. Dr. Vaughn discussed at length his findings in the OR and plan going forward.     Objective:    Vital Signs Last 24 Hrs  T(C): 36.4 (01-07-22 @ 12:17), Max: 36.4 (01-07-22 @ 12:17)  T(F): 97.6 (01-07-22 @ 12:17), Max: 97.6 (01-07-22 @ 12:17)  HR: 86 (01-07-22 @ 12:17) (86 - 86)  BP: 137/70 (01-07-22 @ 12:17) (137/70 - 137/70)  BP(mean): --  RR: 17 (01-07-22 @ 12:17) (17 - 17)  SpO2: 95% (01-07-22 @ 12:17) (95% - 95%)  AVSS    PE:  General: Patient alert and oriented, NAD  Dressing: Clean/dry/intact right lower extremity dressing/KI   Moving right foot                             11.3   15.42 )-----------( 223      ( 07 Jan 2022 07:04 )             36.5   01-07    137  |  101  |  25<H>  ----------------------------<  144<H>  4.2   |  24  |  0.90    Ca    9.0      07 Jan 2022 07:04    TPro  7.1  /  Alb  3.4<L>  /  TBili  0.8  /  DBili  x   /  AST  41  /  ALT  27  /  AlkPhos  132<H>  01-05      A/P: 66yMale POD#1 s/p above procedure   1. Pain control as needed  2. DVT prophylaxis: lovenox  3. PT, weight-bearing status:  TTWB in KI   4. appreciate recommendations Dr. Ruffin - per Dr. Ruffin, JOHN PAUL and gallium scan ordered  5. Dr. Malloy vascular surgery consulted for planning for potential need for AKA in the future     Ortho Pager [phone] Orthopaedic Surgery Progress Note    Post-operative day #1 s/p repeat explant, spacer exchange, revision gastroc flap     Subjective:     Patient seen and examined with Dr. Vaughn. Patient comfortable without complaints. Dr. Vaughn discussed at length his findings in the OR and plan going forward.     Objective:    Vital Signs Last 24 Hrs  T(C): 36.4 (01-07-22 @ 12:17), Max: 36.4 (01-07-22 @ 12:17)  T(F): 97.6 (01-07-22 @ 12:17), Max: 97.6 (01-07-22 @ 12:17)  HR: 86 (01-07-22 @ 12:17) (86 - 86)  BP: 137/70 (01-07-22 @ 12:17) (137/70 - 137/70)  BP(mean): --  RR: 17 (01-07-22 @ 12:17) (17 - 17)  SpO2: 95% (01-07-22 @ 12:17) (95% - 95%)  AVSS    PE:  General: Patient alert and oriented, NAD  Dressing: Clean/dry/intact right lower extremity dressing/KI   Moving right foot                             11.3   15.42 )-----------( 223      ( 07 Jan 2022 07:04 )             36.5   01-07    137  |  101  |  25<H>  ----------------------------<  144<H>  4.2   |  24  |  0.90    Ca    9.0      07 Jan 2022 07:04    TPro  7.1  /  Alb  3.4<L>  /  TBili  0.8  /  DBili  x   /  AST  41  /  ALT  27  /  AlkPhos  132<H>  01-05      A/P: 66yMale POD#1 s/p above procedure   1. Pain control as needed  2. DVT prophylaxis: lovenox  3. PT, weight-bearing status:  TTWB in KI   4. appreciate recommendations Dr. Ruffin - per Dr. Ruffin, JOHN PAUL and gallium scan ordered  5. Dr. Maloly vascular surgery consulted for planning for potential need for AKA      Ortho Pager [phone]

## 2022-01-07 NOTE — PROGRESS NOTE ADULT - SUBJECTIVE AND OBJECTIVE BOX
Ortho Progress Note    Procedure: Repeat explant, spacer exchange and revision gastroc flap   Surgeon: Dr. JOSEPHINE Vaughn    Subjective:  Pain controlled with medication.  Denies CP, SOB, N/V, numbness/tingling.    Objective:  Vital Signs Last 24 Hrs  T(C): 36.2 (07 Jan 2022 05:20), Max: 37.3 (06 Jan 2022 14:35)  T(F): 97.2 (07 Jan 2022 05:20), Max: 99.2 (06 Jan 2022 14:35)  HR: 85 (07 Jan 2022 05:20) (81 - 101)  BP: 126/70 (07 Jan 2022 05:20) (104/59 - 143/59)  BP(mean): 80 (06 Jan 2022 22:18) (77 - 88)  RR: 18 (07 Jan 2022 05:20) (12 - 23)  SpO2: 99% (07 Jan 2022 05:20) (93% - 99%)    AVSS    General: Pt Alert and oriented, NAD  RLE:  Dressing C/D/I, KI in place  Motor: EHL/FHL/TA/GS firing  Sensation: SILT throughout all nerve distributions  Pulses: Toes WWP    A/P: 66yMale s/p repeat explant, spacer exchange and revision gastroc flap on 01-06  - Stable  - Pain Control  - DVT ppx: SCDs, Lovenox 30mg bid  - Post op abx: tobramycin 4800mg IM x1, vancomycin 9000mg x1, vancomycin 1500mg q12h  - Resume home meds  - PT, WBS: TTWB    Ortho Pager [phone] Ortho Progress Note    Procedure: Repeat explant, spacer exchange and revision gastroc flap   Surgeon: Dr. JOSEPHINE Vaughn    Subjective:  Pain controlled with medication.  Denies CP, SOB, N/V, numbness/tingling.    Objective:  Vital Signs Last 24 Hrs  T(C): 36.2 (07 Jan 2022 05:20), Max: 37.3 (06 Jan 2022 14:35)  T(F): 97.2 (07 Jan 2022 05:20), Max: 99.2 (06 Jan 2022 14:35)  HR: 85 (07 Jan 2022 05:20) (81 - 101)  BP: 126/70 (07 Jan 2022 05:20) (104/59 - 143/59)  BP(mean): 80 (06 Jan 2022 22:18) (77 - 88)  RR: 18 (07 Jan 2022 05:20) (12 - 23)  SpO2: 99% (07 Jan 2022 05:20) (93% - 99%)    AVSS    General: Pt Alert and oriented, NAD  RLE:  Dressing C/D/I, KI in place, VAC in place  Motor: EHL/FHL/TA/GS firing  Sensation: SILT throughout all nerve distributions  Pulses: Toes WWP    A/P: 66yMale s/p repeat explant, spacer exchange and revision gastroc flap on 01-06  - Stable  - Pain Control  - DVT ppx: SCDs, Lovenox 30mg bid  - Post op abx: tobramycin 4800mg IM x1, vancomycin 9000mg x1, vancomycin 1500mg q12h  - Resume home meds  - PT, WBS: TTWB     Ortho Pager [phone]

## 2022-01-07 NOTE — PROGRESS NOTE ADULT - SUBJECTIVE AND OBJECTIVE BOX
INFECTIOUS DISEASES CONSULT FOLLOW-UP NOTE    INTERVAL HPI/OVERNIGHT EVENTS:  No event overnight  patient c/o R knee pain     ROS:   Constitutional, eyes, ENT, cardiovascular, respiratory, gastrointestinal, genitourinary, integumentary, neurological, psychiatric and heme/lymph are otherwise negative other than noted above       ANTIBIOTICS/RELEVANT:    MEDICATIONS  (STANDING):  acetaminophen     Tablet .. 1000 milliGRAM(s) Oral every 8 hours  atorvastatin 80 milliGRAM(s) Oral at bedtime  dextrose 40% Gel 15 Gram(s) Oral once  dextrose 5%. 1000 milliLiter(s) (50 mL/Hr) IV Continuous <Continuous>  dextrose 5%. 1000 milliLiter(s) (100 mL/Hr) IV Continuous <Continuous>  dextrose 50% Injectable 25 Gram(s) IV Push once  dextrose 50% Injectable 12.5 Gram(s) IV Push once  dextrose 50% Injectable 25 Gram(s) IV Push once  DULoxetine 20 milliGRAM(s) Oral daily  enoxaparin Injectable 30 milliGRAM(s) SubCutaneous every 12 hours  furosemide    Tablet 40 milliGRAM(s) Oral daily  glucagon  Injectable 1 milliGRAM(s) IntraMuscular once  insulin glargine Injectable (LANTUS) 20 Unit(s) SubCutaneous at bedtime  insulin lispro (ADMELOG) corrective regimen sliding scale   SubCutaneous every 6 hours  lactated ringers. 1000 milliLiter(s) (80 mL/Hr) IV Continuous <Continuous>  levothyroxine 25 MICROGram(s) Oral daily  pantoprazole    Tablet 40 milliGRAM(s) Oral before breakfast  vancomycin  IVPB 1250 milliGRAM(s) IV Intermittent every 12 hours    MEDICATIONS  (PRN):  ALPRAZolam 0.5 milliGRAM(s) Oral every 8 hours PRN anxiety  aluminum hydroxide/magnesium hydroxide/simethicone Suspension 30 milliLiter(s) Oral every 4 hours PRN Dyspepsia  cyclobenzaprine 5 milliGRAM(s) Oral three times a day PRN Muscle Spasm  HYDROmorphone  Injectable 0.5 milliGRAM(s) IV Push every 15 minutes PRN pacu  HYDROmorphone  Injectable 0.5 milliGRAM(s) IV Push every 3 hours PRN breakthru  oxyCODONE    IR 5 milliGRAM(s) Oral every 4 hours PRN Moderate Pain (4 - 6)  oxyCODONE    IR 10 milliGRAM(s) Oral every 4 hours PRN Severe Pain (7 - 10)        Vital Signs Last 24 Hrs  T(C): 36.4 (07 Jan 2022 12:17), Max: 37.3 (06 Jan 2022 14:35)  T(F): 97.6 (07 Jan 2022 12:17), Max: 99.2 (06 Jan 2022 14:35)  HR: 86 (07 Jan 2022 12:17) (78 - 101)  BP: 137/70 (07 Jan 2022 12:17) (104/59 - 143/59)  BP(mean): 80 (06 Jan 2022 22:18) (77 - 88)  RR: 17 (07 Jan 2022 12:17) (12 - 23)  SpO2: 95% (07 Jan 2022 12:17) (93% - 99%)    01-06-22 @ 07:01  -  01-07-22 @ 07:00  --------------------------------------------------------  IN: 860 mL / OUT: 800 mL / NET: 60 mL    01-07-22 @ 07:01  -  01-07-22 @ 14:34  --------------------------------------------------------  IN: 160 mL / OUT: 0 mL / NET: 160 mL      PHYSICAL EXAM:  Constitutional: alert, NAD  Eyes: the sclera and conjunctiva were normal.   ENT: the ears and nose were normal in appearance.   Neck: the appearance of the neck was normal and the neck was supple.   Pulmonary: no respiratory distress and lungs were clear to auscultation bilaterally.   Heart: heart rate was normal and rhythm regular, normal S1 and S2  Ext: R knee covered with dressing and brace, has wound vac   Abdomen: normal bowel sounds, soft, non-tender  Neurological: no focal deficits.         LABS:                        11.3   15.42 )-----------( 223      ( 07 Jan 2022 07:04 )             36.5     01-07    137  |  101  |  25<H>  ----------------------------<  144<H>  4.2   |  24  |  0.90    Ca    9.0      07 Jan 2022 07:04    TPro  7.1  /  Alb  3.4<L>  /  TBili  0.8  /  DBili  x   /  AST  41  /  ALT  27  /  AlkPhos  132<H>  01-05          MICROBIOLOGY:  1/5 BCx ngtd  1/5 BCx MRSA  1/5 Synovial fluid: staph aureus      RADIOLOGY & ADDITIONAL STUDIES:  TTE 1/6/22  CONCLUSIONS:   1. Mildly dilated left ventricular size.   2. Severely reduced left ventricular systolic function. LVEF 15-20%.   3. The basal to mid inferior wall and basal to mid inferoseprtum are   akinetic.   4. Grade II left ventricular diastolic dysfunction.   5. Device leads are noted in the right heart.   6. Aortic sclerosis without significant stenosis.   7. Pulmonary artery systolic pressure is 37 mmHg.   8. No pericardial effusion.   9. Normal right ventricular size and systolic function.  10. Compared to the previous TTE performed on 10/6/2020, the left   ventricular systolic function is similar.      CT LE R  1/6  IMPRESSION:    Status post resection arthroplasty of the knee with fracture of the   cement surrounding the distal aspect of the femoral daniela. There is also   lucency surrounding the cement spacer, for which some degree of   mechanical loosening cannot be entirely excluded. Mild foci of air within   the joint space could reflect sequelae of recent instrumentation although   correlation with physical examination may be of utility to entirely   exclude superimposed infection, if there is clinical suspicion.    Status post gamma nail fixation of a remote proximal femoral fracture   without hardware complication..

## 2022-01-07 NOTE — DISCHARGE NOTE PROVIDER - CARE PROVIDERS DIRECT ADDRESSES
[email or web address]

## 2022-01-07 NOTE — PROGRESS NOTE ADULT - ASSESSMENT
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   now s/p antibiotic cement spacer exchange, I&D and revision gastroc flap by PRS on 1/6.  Patient already had multiple recurrence and he had flank pus in his R knee.  Currently evaluated for R AKA.      - cont vancomycin 1250mg IV q12h, check trough before 4th dose, goal 15-20  - obtain JOHN PAUL to r/o endocarditis  - obtain gallium scan to r/o ICD infection  - f/u OR culture   - daily BCx until bacteremia clears >48h (please send BCx tomorrow as well)  - f/u BCx  - f/u MRSA susceptibility       Team 2 will follow you.  Case d/w primary team.    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   now s/p antibiotic cement spacer exchange, I&D and revision gastroc flap by PRS on 1/6.  Patient already had multiple recurrence and he had flank pus in his R knee.  Currently evaluated for R AKA.      - cont vancomycin 1250mg IV q12h, check trough before 4th dose, goal 15-20  - obtain JOHN PAUL to r/o endocarditis  - obtain gallium scan to r/o ICD infection  - f/u OR culture   - daily BCx until bacteremia clears >48h (please send BCx tomorrow as well)  - f/u BCx  - f/u MRSA susceptibility       Team 2 will follow you.  Dr Vaughan will resume care on Monday.  Case d/w primary team.    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   now s/p antibiotic cement spacer exchange, I&D and revision gastroc flap by PRS on 1/6.  Patient already had multiple recurrence and he had flank pus in his R knee.  Currently evaluated for R AKA.      - cont vancomycin 1250mg IV q12h, check trough before 4th dose, goal 15-20  - obtain JOHN PAUL to r/o endocarditis  - obtain gallium scan to r/o ICD infection  - f/u OR culture   - daily BCx until bacteremia clears >48h (please send BCx tomorrow as well)  - f/u BCx  - f/u MRSA susceptibility       Team 2 will follow you.  Dr. Vaughan is covering me this weekend.  Case d/w primary team.    Marta Ruffin MD, MS  Infectious Disease attending  work cell [phone]   For any questions during evening/weekend/holiday, please page ID on call

## 2022-01-07 NOTE — CONSULT NOTE ADULT - SUBJECTIVE AND OBJECTIVE BOX
HPI: 66M hx of CAD, CHF (EF 20-25%), DM, 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 Castro (explant and abx spacer exchange), transferred here from [facility] for recurrent PJI for repeat spacer exchange and possible flap coverage with Dr. Vaughn/Dr. Bowen. Pt has been experiencing atraumatic worsening R knee pain x 1 month. Notes he went to a procedure center where his knee was aspirated 3 weeks ago. Pt was seen at [facility] yesterday where his knee was aspirated with 283k cell count. WBC 14, ESR 78, +, AVSS. Pt started on vancomycin + rifampin. 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 Now s/p Replacement, antibiotic spacer 06-Jan-2022      FSG & insulin:    Dinner FSG   Lispro   +    Ate  Bedtime FSG     Lantus      and Lispro         Breakfast FSG   Lispro    +    Ate  Lunch FSG      Lispro    +    Ate      Age at Dx:  How dx:  Hx and duration of insulin:  Current Therapy:· 	insulin glargine: 20 unit(s) subcutaneously once a day  levothyroxine 25 mcg (0.025 mg) oral tablet: 1 tab(s) orally once a day  Hx of other regimens:· 	    Home FSG:  Fasting  Lunch  Dinner  Bed    Diet:  Exercise:    Hx of hypoglycemia:  Hx of DKA/HHS:  Complications:  Outpatient Endo:    FH:  DM:  Thyroid:  Autoimmune:  Other:    SH:  Smoking  Etoh:  Recreational Drugs:  Social Life:    PMH & Surgical Hx:SEPTIC KNEE ARTHRITIS    K82.3    Family history of heart disease (Mother)    Family history of heart disease (Mother)    Family history of allergies    Handoff    MEWS Score    Status post percutaneous transluminal coronary angioplasty    History of surgery to heart and great vessels, presenting hazards to health    Atherosclerosis of coronary artery    Type 2 diabetes mellitus with neurological manifestations    Type 2 diabetes mellitus    HTN (hypertension)    Osteoarthritis    Chronic systolic CHF (congestive heart failure)    HLD (hyperlipidemia)    Hyperkalemia    COPD, moderate    CKD (chronic kidney disease) stage 2, GFR 60-89 ml/min    Blood clot due to device, implant, or graft    Diabetes    HTN (hypertension)    CHF (congestive heart failure)    History of celiac disease    MRSA bacteremia    Diverticulitis    STEMI (ST elevation myocardial infarction)    Diabetic neuropathy    Celiac disease    Anxiety and depression    Diabetic foot ulcer    Preoperative clearance    HTN (hypertension)    CHF (congestive heart failure)    Removal of antibiotic spacer from knee    Replacement, antibiotic spacer    Other postprocedural status    History of surgery to major organs, presenting hazards to health    Stented coronary artery    S/P CABG x 1    S/P TKR (total knee replacement), right    Surgery, elective    Surgery, elective    History of open reduction and internal fixation (ORIF) procedure    H/O shoulder surgery    AICD (automatic cardioverter/defibrillator) present    Cholecystostomy care    History of tonsillectomy    History of hip replacement, total, right    [id number]            Current Meds:  acetaminophen     Tablet .. 1000 milliGRAM(s) Oral every 8 hours  ALPRAZolam 0.5 milliGRAM(s) Oral every 8 hours PRN  aluminum hydroxide/magnesium hydroxide/simethicone Suspension 30 milliLiter(s) Oral every 4 hours PRN  atorvastatin 80 milliGRAM(s) Oral at bedtime  cyclobenzaprine 5 milliGRAM(s) Oral three times a day PRN  dextrose 40% Gel 15 Gram(s) Oral once  dextrose 5%. 1000 milliLiter(s) IV Continuous <Continuous>  dextrose 5%. 1000 milliLiter(s) IV Continuous <Continuous>  dextrose 50% Injectable 25 Gram(s) IV Push once  dextrose 50% Injectable 12.5 Gram(s) IV Push once  dextrose 50% Injectable 25 Gram(s) IV Push once  DULoxetine 20 milliGRAM(s) Oral daily  enoxaparin Injectable 30 milliGRAM(s) SubCutaneous every 12 hours  furosemide    Tablet 40 milliGRAM(s) Oral daily  glucagon  Injectable 1 milliGRAM(s) IntraMuscular once  HYDROmorphone  Injectable 0.5 milliGRAM(s) IV Push every 15 minutes PRN  HYDROmorphone  Injectable 0.5 milliGRAM(s) IV Push every 3 hours PRN  insulin glargine Injectable (LANTUS) 20 Unit(s) SubCutaneous at bedtime  insulin lispro (ADMELOG) corrective regimen sliding scale   SubCutaneous every 6 hours  lactated ringers. 1000 milliLiter(s) IV Continuous <Continuous>  levothyroxine 25 MICROGram(s) Oral daily  oxyCODONE    IR 5 milliGRAM(s) Oral every 4 hours PRN  oxyCODONE    IR 10 milliGRAM(s) Oral every 4 hours PRN  pantoprazole    Tablet 40 milliGRAM(s) Oral before breakfast  tobramycin Injectable 4800 milliGRAM(s) IntraMuscular once  vancomycin  IVPB 9000 milliGRAM(s) IV Intermittent once  vancomycin  IVPB 1250 milliGRAM(s) IV Intermittent every 12 hours      Allergies:  ACE inhibitors (Hives)  carvedilol (Other)  enalapril (Hives)  Entresto (Other)      ROS:  Denies the following except as indicated.    General: weight loss/weight gain, decreased appetite, fatigue  Eyes: Blurry vision, double vision, visual changes  ENT: Throat pain, changes in voice,   CV: palpitations, SOB, CP, cough  GI: NVD, difficulty swallowing, abdominal pain  : polyuria, dysuria  Endo: abnormal menses, temperature intolerance, decreased libido  MSK: weakness, joint pain  Skin: rash, dryness, diaphoresis  Heme: Easy bruising, bleeding  Neuro: HA, dizziness, lightheadedness, numbness/ tingling  Psych: Anxiety, Depression    Vital Signs Last 24 Hrs  T(C): 36.2 (07 Jan 2022 05:20), Max: 37.3 (06 Jan 2022 14:35)  T(F): 97.2 (07 Jan 2022 05:20), Max: 99.2 (06 Jan 2022 14:35)  HR: 85 (07 Jan 2022 05:20) (81 - 101)  BP: 126/70 (07 Jan 2022 05:20) (104/59 - 143/59)  BP(mean): 80 (06 Jan 2022 22:18) (77 - 88)  RR: 18 (07 Jan 2022 05:20) (12 - 23)  SpO2: 99% (07 Jan 2022 05:20) (93% - 99%)  Height (cm): 185.4 (01-06 @ 05:12)  Weight (kg): 97.5 (01-06 @ 05:12)  BMI (kg/m2): 28.4 (01-06 @ 05:12)      Constitutional: wn/wd in NAD.   HEENT: NCAT, MMM, OP clear, EOMI, , no proptosis or lid retraction  Neck: no thyromegaly or palpable thyroid nodules   Respiratory: lungs CTAB.  Cardiovascular: regular rhythm, normal S1 and S2, no audible murmurs, no peripheral edema  GI: soft, NT/ND, no masses/HSM appreciated.  Neurology: no tremors, DTR 2+  Skin: no visible rashes/lesions. no acanthosis nigricans. no hyperlipotrophy. no cushing's stigmata.  Psychiatric: AAO x 3, normal affect/mood.  Ext: radial pulses intact, DP pulses intact, extremities warm, no cyanosis, clubbing or edema.       LABS:                        11.3   15.42 )-----------( 223      ( 07 Jan 2022 07:04 )             36.5     01-07    137  |  101  |  25<H>  ----------------------------<  144<H>  4.2   |  24  |  0.90    Ca    9.0      07 Jan 2022 07:04    TPro  7.1  /  Alb  3.4<L>  /  TBili  0.8  /  DBili  x   /  AST  41  /  ALT  27  /  AlkPhos  132<H>  01-05          Thyroid Stimulating Hormone, Serum: 1.68 (03-12 @ 17:00)      RADIOLOGY & ADDITIONAL STUDIES:  CAPILLARY BLOOD GLUCOSE      POCT Blood Glucose.: 123 mg/dL (07 Jan 2022 06:17)  POCT Blood Glucose.: 216 mg/dL (07 Jan 2022 00:08)  POCT Blood Glucose.: 196 mg/dL (06 Jan 2022 21:38)  POCT Blood Glucose.: 267 mg/dL (06 Jan 2022 14:08)        A/P:66y Male    1.  DM -   A1C:  Weight:  Cr/GRF:  ER:    Please continue lantus       units at night / morning.  Please continue lispro      units before each meal.  Please continue lispro moderate / low dose sliding scale four times daily with meals and at bedtime    Pt's fingerstick glucose goal is     Will continue to monitor     For discharge, pt can continue    Pt can follow up at discharge with [facility] by calling  to make an appointment.   Will discuss case with     and update primary team    To Make an appointment at [address] [place] [address] for the patient, either:  1. Send a STAT task via ISBX to Carolyne Armstrong or Elissa Anne (office managers)   OR  2. Call supervisor's line. P: [phone] (do not give this number to patient). VM is checked periodically.  In the message, specify that this is a hospital discharge follow-up, and that the appt can be made with a NP if there is no timely MD availability.    REMINDERS FOR INSULIN/DIABETES SUPPLIES at DISCHARGE:  INSULIN:   Long actin/Basal Insulin: Examples: Toujeo, Basaglar, Tresiba, Lantus   Short acting/Bolus Insulin: Humalog, Admelog, Novolog  Please ensure that BOTH short acting and long acting insulin are prescribed in the same preparation (Ex: PEN vs VIAL/SOLUTION)     TESTING SUPPLIES:   All glucometer supplies should be written as generic to avoid issues with insurance. Use the free text option in sunrise prescription writer, and type in glucometer test strips, lancets, etc to order.    If sending patient home on insulin PEN, please send:   •	BD juventino insulin pen needles for use up to 4 times daily (total quantity 100)  •	Lancets for use up to 4 times daily (total quantity 100)  •	Glucometer Test strips for use up to 4 times daily (total quantity 100)  •	Alcohol swabs for use up to 4 times daily (total quantity 100)  •	Glucometer (If provided by hospital, still provide scripts for lancets, test strips, and swabs)  If sending patient home on insulin VIAL, please send:   •	Insulin syringes (6mm) - for use up to 4 times daily (total quantity 100)  •	Lancets for use up to 4 times daily (total quantity 100)  •	Generic Glucometer Test strips for use up to 4 times daily (total quantity 100)  •	Alcohol swabs for use up to 4 times daily (total quantity 100)  •	Generic Glucometer (If provided by hospital, still provide scripts for lancets, test strips, and swabs)  •	Do not specify brand for testing supplies (such as contour, freestyle, one touch etc) that way the pharmacy has the freedom to pick and change according to what the insurance dictates.  For patients without insurance:   •	Provide social work with appropriate scripts so they may obtain 1 week of samples  •	Provide with glucometer. Glucometers are located at various nursing stations, the nursing office, education, and endocrine fellows office.  •	Please make appointment with Shahnaz Whitlock NP or Charisse Alejandre RN and EFRAIN Correa at the [address] [place] [address] endocrinology clinic. They can see patients without insurance, provide appropriate samples, and assist in getting insurance coverage.     PREFERRED PHARMACY:  [facility] (located on 1st floor next to admitting)  P: [phone]  Hours: M – F 8AM – 8PM, Sat 8AM – 4PM, Sun—closed  If not using [facility], please follow up with chosen pharmacy to ensure insulin prescribed is covered.        HPI: 66M hx of CAD with CABG , CHF (EF 20-25%) with AICD, T2DM c/b neuropathy with hx of diabetic ulcer, HLD. HTN, COPD, diverticulitis, celiac disease, anxiety and depression, cholecystostomy, R hip replacement 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 Castro (explant and abx spacer exchange), transferred here from [facility] for recurrent PJI for repeat spacer exchange and possible flap coverage with Dr. Vaughn/Dr. Bowen. Pt has been experiencing atraumatic worsening R knee pain x 1 month. Notes he went to a procedure center where his knee was aspirated 3 weeks ago. Pt was seen at [facility] yesterday where his knee was aspirated with 283k cell count. WBC 14, ESR 78, +, AVSS. Pt started on vancomycin + rifampin. 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 Now s/p Replacement, antibiotic spacer 06-Jan-2022    Endocrine consulted for uncontrolled T2DM with A1C 9.0%.    May 2021 : endo consult [facility] -   pt has been missing his insulin pump. Agree with transitioning to basal bolus as indicated above + correction scale(sliding scale). Might need titration of doses given his infection and also per last endocrine note he was 670g- manual mode, CIR 1:3 to 1: 6, basal 1.7 unit/hr, correction 1:25, which indicates he is quite insulin resistant. Discharge on basal bolus regimen- with sufficient refills until Pump can be reinitiated by his endocrinologist(Dr. Cormier). At Discharge 25 and 3    FSG & insulin:    Dinner FSG   Lispro   +    Ate  Bedtime FSG     Lantus      and Lispro         Breakfast FSG   Lispro    +    Ate  Lunch FSG      Lispro    +    Ate      Age at Dx:  How dx:  Hx and duration of insulin:  Current Therapy:· 	insulin glargine: 20 unit(s) subcutaneously once a day  Jardiance 10mg daily since 2018. duloxetine 30mg BID, pump- lyumjev, rosuvastatin 40mg daily  Hx of other regimens:· 	    Home FSG: freestyle storm  Fasting  Lunch  Dinner  Bed    Diet:  Exercise:    Hx of hypoglycemia:  Hx of DKA/HHS:  Complications:  Outpatient Endo: Dr Schmitt. next appt 2/8/2022.    Hypothyroidism:  TSH 4-5 past several years, not on therapy. Nov 2021 - TSH 8.08, free T4 0.9; started on levothyroxine 25mcg daily.    FH:  DM:  Thyroid:  Autoimmune:  Other:    SH:  Smoking  Etoh:  Recreational Drugs:  Social Life:    PMH & Surgical Hx:  SEPTIC KNEE ARTHRITIS  Family history of heart disease (Mother)  Status post percutaneous transluminal coronary angioplasty  History of surgery to heart and great vessels, presenting hazards to health  Atherosclerosis of coronary artery  Type 2 diabetes mellitus with neurological manifestations  HTN (hypertension)  Osteoarthritis  Chronic systolic CHF (congestive heart failure)  HLD (hyperlipidemia)  Hyperkalemia  COPD, moderate  CKD (chronic kidney disease) stage 2, GFR 60-89 ml/min  Blood clot due to device, implant, or graft  History of celiac disease  MRSA bacteremia  Diverticulitis  STEMI (ST elevation myocardial infarction)  Diabetic neuropathy  Anxiety and depression  Diabetic foot ulcer  Stented coronary artery  S/P CABG x 1  S/P TKR (total knee replacement), right  History of open reduction and internal fixation (ORIF) procedure  H/O shoulder surgery  AICD (automatic cardioverter/defibrillator) present  Cholecystostomy care  History of tonsillectomy  History of hip replacement, total, right      Current Meds:  acetaminophen     Tablet .. 1000 milliGRAM(s) Oral every 8 hours  ALPRAZolam 0.5 milliGRAM(s) Oral every 8 hours PRN  aluminum hydroxide/magnesium hydroxide/simethicone Suspension 30 milliLiter(s) Oral every 4 hours PRN  atorvastatin 80 milliGRAM(s) Oral at bedtime  cyclobenzaprine 5 milliGRAM(s) Oral three times a day PRN  dextrose 40% Gel 15 Gram(s) Oral once  dextrose 5%. 1000 milliLiter(s) IV Continuous <Continuous>  dextrose 5%. 1000 milliLiter(s) IV Continuous <Continuous>  dextrose 50% Injectable 25 Gram(s) IV Push once  dextrose 50% Injectable 12.5 Gram(s) IV Push once  dextrose 50% Injectable 25 Gram(s) IV Push once  DULoxetine 20 milliGRAM(s) Oral daily  enoxaparin Injectable 30 milliGRAM(s) SubCutaneous every 12 hours  furosemide    Tablet 40 milliGRAM(s) Oral daily  glucagon  Injectable 1 milliGRAM(s) IntraMuscular once  HYDROmorphone  Injectable 0.5 milliGRAM(s) IV Push every 15 minutes PRN  HYDROmorphone  Injectable 0.5 milliGRAM(s) IV Push every 3 hours PRN  insulin glargine Injectable (LANTUS) 20 Unit(s) SubCutaneous at bedtime  insulin lispro (ADMELOG) corrective regimen sliding scale   SubCutaneous every 6 hours  lactated ringers. 1000 milliLiter(s) IV Continuous <Continuous>  levothyroxine 25 MICROGram(s) Oral daily  oxyCODONE    IR 5 milliGRAM(s) Oral every 4 hours PRN  oxyCODONE    IR 10 milliGRAM(s) Oral every 4 hours PRN  pantoprazole    Tablet 40 milliGRAM(s) Oral before breakfast  tobramycin Injectable 4800 milliGRAM(s) IntraMuscular once  vancomycin  IVPB 9000 milliGRAM(s) IV Intermittent once  vancomycin  IVPB 1250 milliGRAM(s) IV Intermittent every 12 hours      Allergies:  ACE inhibitors (Hives)  carvedilol (Other)  enalapril (Hives)  Entresto (Other)      ROS:  Denies the following except as indicated.    General: weight loss/weight gain, decreased appetite, fatigue  Eyes: Blurry vision, double vision, visual changes  ENT: Throat pain, changes in voice,   CV: palpitations, SOB, CP, cough  GI: NVD, difficulty swallowing, abdominal pain  : polyuria, dysuria  Endo: abnormal menses, temperature intolerance, decreased libido  MSK: weakness, joint pain  Skin: rash, dryness, diaphoresis  Heme: Easy bruising, bleeding  Neuro: HA, dizziness, lightheadedness, numbness/ tingling  Psych: Anxiety, Depression    Vital Signs Last 24 Hrs  T(C): 36.2 (07 Jan 2022 05:20), Max: 37.3 (06 Jan 2022 14:35)  T(F): 97.2 (07 Jan 2022 05:20), Max: 99.2 (06 Jan 2022 14:35)  HR: 85 (07 Jan 2022 05:20) (81 - 101)  BP: 126/70 (07 Jan 2022 05:20) (104/59 - 143/59)  BP(mean): 80 (06 Jan 2022 22:18) (77 - 88)  RR: 18 (07 Jan 2022 05:20) (12 - 23)  SpO2: 99% (07 Jan 2022 05:20) (93% - 99%)  Height (cm): 185.4 (01-06 @ 05:12)  Weight (kg): 97.5 (01-06 @ 05:12)  BMI (kg/m2): 28.4 (01-06 @ 05:12)      Constitutional: wn/wd in NAD.   HEENT: NCAT, MMM, OP clear, EOMI, , no proptosis or lid retraction  Neck: no thyromegaly or palpable thyroid nodules   Respiratory: lungs CTAB.  Cardiovascular: regular rhythm, normal S1 and S2, no audible murmurs, no peripheral edema  GI: soft, NT/ND, no masses/HSM appreciated.  Neurology: no tremors, DTR 2+  Skin: no visible rashes/lesions. no acanthosis nigricans. no hyperlipotrophy. no cushing's stigmata.  Psychiatric: AAO x 3, normal affect/mood.  Ext: radial pulses intact, DP pulses intact, extremities warm, no cyanosis, clubbing or edema.       LABS:                        11.3   15.42 )-----------( 223      ( 07 Jan 2022 07:04 )             36.5     01-07    137  |  101  |  25<H>  ----------------------------<  144<H>  4.2   |  24  |  0.90    Ca    9.0      07 Jan 2022 07:04    TPro  7.1  /  Alb  3.4<L>  /  TBili  0.8  /  DBili  x   /  AST  41  /  ALT  27  /  AlkPhos  132<H>  01-05          Thyroid Stimulating Hormone, Serum: 1.68 (03-12 @ 17:00)      RADIOLOGY & ADDITIONAL STUDIES:  CAPILLARY BLOOD GLUCOSE      POCT Blood Glucose.: 123 mg/dL (07 Jan 2022 06:17)  POCT Blood Glucose.: 216 mg/dL (07 Jan 2022 00:08)  POCT Blood Glucose.: 196 mg/dL (06 Jan 2022 21:38)  POCT Blood Glucose.: 267 mg/dL (06 Jan 2022 14:08)     HPI: 66M hx of CAD with CABG , CHF (EF 20-25%) with AICD, T2DM c/b neuropathy with hx of diabetic ulcer, HLD. HTN, COPD, diverticulitis, celiac disease, anxiety and depression, cholecystostomy, R hip replacement 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 Castro (explant and abx spacer exchange), transferred here from [facility] for recurrent PJI for repeat spacer exchange and possible flap coverage with Dr. Vaughn/Dr. Bowen. Pt has been experiencing atraumatic worsening R knee pain x 1 month. Notes he went to a procedure center where his knee was aspirated 3 weeks ago. Pt was seen at [facility] yesterday where his knee was aspirated with 283k cell count. WBC 14, ESR 78, +, AVSS. Pt started on vancomycin + rifampin. 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 Now s/p Replacement, antibiotic spacer 06-Jan-2022    Endocrine consulted for uncontrolled T2DM with A1C 9.0%.  Patient seen and examined at bedside. Minimal history obtained as he is confused and drowsy after pain meds. Most history from chart and allscripts. Appetite is poor and he didn't eat anything yesterday.  diagnosed 30 years ago.  Previous patient of Dr Cormier and now sees Dr Schmitt in , last visit Nov 2021.  He was on medtronic 670g prior to admission.  Settings per Dr Schmitt's notes - Basal 12AM 2.8, 6AM 3.0, ICR 10, ISF 40.  Previous admission at [facility], discharged on Lantus 25 and lispro 3.  He has also been prescribed Jardiance, but unclear if he is taking.  Has storm. Very little usage in past 2 weeks. 90 day review with 56% usage - TIR 37%, Lows 4%. Lows 6AM - 10AM.    FSG & insulin:  Yesterday:  Bedtime    Lantus 20  MN . Lispro     4  Today  Breakfast  .  Lunch FSG   135     Outpatient Endo: Dr Schmitt. next appt 2/8/2022.    Hypothyroidism:  TSH 4-5 past several years, not on therapy. Nov 2021 - TSH 8.08, free T4 0.9; started on levothyroxine 25mcg daily.    FH: unable to obtain.  SH: unable to obtain.    PMH & Surgical Hx:  SEPTIC KNEE ARTHRITIS  Family history of heart disease (Mother)  Status post percutaneous transluminal coronary angioplasty  History of surgery to heart and great vessels, presenting hazards to health  Atherosclerosis of coronary artery  Type 2 diabetes mellitus with neurological manifestations  HTN (hypertension)  Osteoarthritis  Chronic systolic CHF (congestive heart failure)  HLD (hyperlipidemia)  Hyperkalemia  COPD, moderate  CKD (chronic kidney disease) stage 2, GFR 60-89 ml/min  Blood clot due to device, implant, or graft  History of celiac disease  MRSA bacteremia  Diverticulitis  STEMI (ST elevation myocardial infarction)  Diabetic neuropathy  Anxiety and depression  Diabetic foot ulcer  Stented coronary artery  S/P CABG x 1  S/P TKR (total knee replacement), right  History of open reduction and internal fixation (ORIF) procedure  H/O shoulder surgery  AICD (automatic cardioverter/defibrillator) present  Cholecystostomy care  History of tonsillectomy  History of hip replacement, total, right      Current Meds:  acetaminophen     Tablet .. 1000 milliGRAM(s) Oral every 8 hours  ALPRAZolam 0.5 milliGRAM(s) Oral every 8 hours PRN  aluminum hydroxide/magnesium hydroxide/simethicone Suspension 30 milliLiter(s) Oral every 4 hours PRN  atorvastatin 80 milliGRAM(s) Oral at bedtime  cyclobenzaprine 5 milliGRAM(s) Oral three times a day PRN  dextrose 40% Gel 15 Gram(s) Oral once  dextrose 5%. 1000 milliLiter(s) IV Continuous <Continuous>  dextrose 5%. 1000 milliLiter(s) IV Continuous <Continuous>  dextrose 50% Injectable 25 Gram(s) IV Push once  dextrose 50% Injectable 12.5 Gram(s) IV Push once  dextrose 50% Injectable 25 Gram(s) IV Push once  DULoxetine 20 milliGRAM(s) Oral daily  enoxaparin Injectable 30 milliGRAM(s) SubCutaneous every 12 hours  furosemide    Tablet 40 milliGRAM(s) Oral daily  glucagon  Injectable 1 milliGRAM(s) IntraMuscular once  HYDROmorphone  Injectable 0.5 milliGRAM(s) IV Push every 15 minutes PRN  HYDROmorphone  Injectable 0.5 milliGRAM(s) IV Push every 3 hours PRN  insulin glargine Injectable (LANTUS) 20 Unit(s) SubCutaneous at bedtime  insulin lispro (ADMELOG) corrective regimen sliding scale   SubCutaneous every 6 hours  lactated ringers. 1000 milliLiter(s) IV Continuous <Continuous>  levothyroxine 25 MICROGram(s) Oral daily  oxyCODONE    IR 5 milliGRAM(s) Oral every 4 hours PRN  oxyCODONE    IR 10 milliGRAM(s) Oral every 4 hours PRN  pantoprazole    Tablet 40 milliGRAM(s) Oral before breakfast  tobramycin Injectable 4800 milliGRAM(s) IntraMuscular once  vancomycin  IVPB 9000 milliGRAM(s) IV Intermittent once  vancomycin  IVPB 1250 milliGRAM(s) IV Intermittent every 12 hours      Allergies:  ACE inhibitors (Hives)  carvedilol (Other)  enalapril (Hives)  Entresto (Other)      ROS:  Denies the following except as indicated.    General: + weight loss, +decreased appetite, +fatigue  Eyes: Blurry vision, double vision, visual changes  ENT: Throat pain, changes in voice,   CV: palpitations, SOB, CP, cough  GI: NVD, difficulty swallowing, abdominal pain  : polyuria, dysuria  Endo:  temperature intolerance  MSK: weakness, + joint pain  Skin: rash, dryness, diaphoresis  Heme: Easy bruising, bleeding  Neuro: HA, dizziness, lightheadedness, + numbness/ tingling  Psych: + Anxiety, + Depression    Vital Signs Last 24 Hrs  T(C): 36.2 (07 Jan 2022 05:20), Max: 37.3 (06 Jan 2022 14:35)  T(F): 97.2 (07 Jan 2022 05:20), Max: 99.2 (06 Jan 2022 14:35)  HR: 85 (07 Jan 2022 05:20) (81 - 101)  BP: 126/70 (07 Jan 2022 05:20) (104/59 - 143/59)  BP(mean): 80 (06 Jan 2022 22:18) (77 - 88)  RR: 18 (07 Jan 2022 05:20) (12 - 23)  SpO2: 99% (07 Jan 2022 05:20) (93% - 99%)  Height (cm): 185.4 (01-06 @ 05:12)  Weight (kg): 97.5 (01-06 @ 05:12)  BMI (kg/m2): 28.4 (01-06 @ 05:12)      Constitutional:  NAD.   HEENT: NCAT, MMM, OP clear, EOMI, , no proptosis or lid retraction  Neck: no thyromegaly or palpable thyroid nodules   Respiratory: lungs CTAB.  Cardiovascular: regular rhythm, normal S1 and S2, no audible murmurs, no peripheral edema  GI: soft, NT/ND, no masses/HSM appreciated.  Neurology: no tremors, DTR 2+  Skin: no visible rashes/lesions.  Psychiatric: AAO x 3, normal affect/mood.  Ext:  RLE wrapped, w wound vac       LABS:                        11.3   15.42 )-----------( 223      ( 07 Jan 2022 07:04 )             36.5     01-07    137  |  101  |  25<H>  ----------------------------<  144<H>  4.2   |  24  |  0.90    Ca    9.0      07 Jan 2022 07:04    TPro  7.1  /  Alb  3.4<L>  /  TBili  0.8  /  DBili  x   /  AST  41  /  ALT  27  /  AlkPhos  132<H>  01-05          Thyroid Stimulating Hormone, Serum: 1.68 (03-12 @ 17:00)      RADIOLOGY & ADDITIONAL STUDIES:  CAPILLARY BLOOD GLUCOSE      POCT Blood Glucose.: 123 mg/dL (07 Jan 2022 06:17)  POCT Blood Glucose.: 216 mg/dL (07 Jan 2022 00:08)  POCT Blood Glucose.: 196 mg/dL (06 Jan 2022 21:38)  POCT Blood Glucose.: 267 mg/dL (06 Jan 2022 14:08)

## 2022-01-07 NOTE — DISCHARGE NOTE PROVIDER - NSDCMRMEDTOKEN_GEN_ALL_CORE_FT
ALPRAZolam 0.5 mg oral tablet: 1 tab(s) orally every 8 hours  aluminum hydroxide-magnesium hydroxide 200 mg-200 mg/5 mL oral suspension: 30 milliliter(s) orally every 4 hours, As needed, Dyspepsia  aspirin 81 mg oral delayed release tablet: 1 tab(s) orally once a day  DULoxetine 30 mg oral delayed release capsule: 1 cap(s) orally 2 times a day  Effient 10 mg oral tablet: 1 tab(s) orally once a day  furosemide 40 mg oral tablet: 1 tab(s) orally once a day, As Needed  insulin glargine: 20 unit(s) subcutaneously once a day  levothyroxine 25 mcg (0.025 mg) oral tablet: 1 tab(s) orally once a day  morphine: 2 milligram(s) intravenous every 4 hours, As Needed  pantoprazole 40 mg oral delayed release tablet: 1 tab(s) orally once a day  rifAMPin 300 mg oral capsule: 1 cap(s) orally 2 times a day  rosuvastatin 40 mg oral tablet: 1 tab(s) orally once a day (at bedtime)  spironolactone 25 mg oral tablet: 1 tab(s) orally once a day  vancomycin 1.5 g intravenous injection: 1.5 gram(s) intravenous every 12 hours   ALPRAZolam 0.5 mg oral tablet: 1 tab(s) orally every 8 hours  aluminum hydroxide-magnesium hydroxide 200 mg-200 mg/5 mL oral suspension: 30 milliliter(s) orally every 4 hours, As needed, Dyspepsia  aspirin 81 mg oral delayed release tablet: 1 tab(s) orally once a day  DAPTOmycin 600 mg Intravenous every 24 hours in 2/10/22:   DULoxetine 30 mg oral delayed release capsule: 1 cap(s) orally 2 times a day  Effient 10 mg oral tablet: 1 tab(s) orally once a day  furosemide 40 mg oral tablet: 1 tab(s) orally once a day, As Needed  Heparin 10 units/ml 3 ml post infusion:   insulin glargine: 20 unit(s) subcutaneously once a day  levothyroxine 25 mcg (0.025 mg) oral tablet: 1 tab(s) orally once a day  morphine: 2 milligram(s) intravenous every 4 hours, As Needed  Normal Saline 0.9% 10ml Pre/Post Infusion:   pantoprazole 40 mg oral delayed release tablet: 1 tab(s) orally once a day  rifAMPin 300 mg oral capsule: 1 cap(s) orally 2 times a day  rosuvastatin 40 mg oral tablet: 1 tab(s) orally once a day (at bedtime)  spironolactone 25 mg oral tablet: 1 tab(s) orally once a day  vancomycin 1.5 g intravenous injection: 1.5 gram(s) intravenous every 12 hours  Weekly labs: CMP, CBC, ESR, CRP, CK faxed to ID office at [phone]: Weekly labs: CMP, CBC, ESR, CRP, CK faxed to ID office at [phone]  - Patient to follow up with Dr. Casillas in 2 weeks ([address] [place] [address], [phone]   acetaminophen 325 mg oral tablet: 3 tab(s) orally every 8 hours  ALPRAZolam 0.5 mg oral tablet: 1 tab(s) orally every 8 hours  aspirin 81 mg oral delayed release tablet: 1 tab(s) orally once a day  atorvastatin 80 mg oral tablet: 1 tab(s) orally once a day (at bedtime)  chlorhexidine 2% topical pad: 1 application topically   cyclobenzaprine 5 mg oral tablet: 1 tab(s) orally 3 times a day, As needed, Muscle Spasm  DAPTOmycin 500 mg intravenous injection: 600 milligram(s) intravenous every 24 hours  DAPTOmycin 600 mg Intravenous every 24 hours in 2/10/22:   DULoxetine 20 mg oral delayed release capsule: 1 cap(s) orally 2 times a day  gabapentin 300 mg oral capsule: 1 cap(s) orally 3 times a day  Heparin 10 units/ml 3 ml post infusion:   insulin glargine: 20 unit(s) subcutaneously once a day  levothyroxine 25 mcg (0.025 mg) oral tablet: 1 tab(s) orally once a day  metoprolol succinate 25 mg oral tablet, extended release: 1 tab(s) orally once a day  Multiple Vitamins oral tablet: 1 tab(s) orally once a day  Normal Saline 0.9% 10ml Pre/Post Infusion:   oxyCODONE 10 mg oral tablet: 1 tab(s) orally every 3 hours, As needed, Severe Pain (7 - 10)  oxyCODONE 5 mg oral tablet: 1 tab(s) orally every 3 hours, As needed, Moderate Pain (4 - 6)  pantoprazole 40 mg oral delayed release tablet: 1 tab(s) orally once a day (before a meal)  prasugrel 10 mg oral tablet: 1 tab(s) orally once a day  senna oral tablet: 2 tab(s) orally once a day (at bedtime)  simethicone 80 mg oral tablet, chewable: 1 tab(s) orally once a day, As needed, Gas  spironolactone 25 mg oral tablet: 1 tab(s) orally once a day  Weekly labs: CMP, CBC, ESR, CRP, CK faxed to ID office at [phone]: Weekly labs: CMP, CBC, ESR, CRP, CK faxed to ID office at [phone]  - Patient to follow up with Dr. Casillas in 2 weeks ([address] [place] [address], [phone]   acetaminophen 325 mg oral tablet: 3 tab(s) orally every 8 hours  aspirin 81 mg oral delayed release tablet: 1 tab(s) orally once a day  atorvastatin 80 mg oral tablet: 1 tab(s) orally once a day (at bedtime)  cyclobenzaprine 5 mg oral tablet: 1 tab(s) orally 3 times a day, As needed, Muscle Spasm  DAPTOmycin 500 mg intravenous injection: 600 milligram(s) intravenous every 24 hours  DAPTOmycin 600 mg Intravenous every 24 hours in 2/10/22:   DULoxetine 20 mg oral delayed release capsule: 1 cap(s) orally 2 times a day  gabapentin 300 mg oral capsule: 1 cap(s) orally 3 times a day  Heparin 10 units/ml 3 ml post infusion:   insulin glargine: 20 unit(s) subcutaneously once a day  levothyroxine 25 mcg (0.025 mg) oral tablet: 1 tab(s) orally once a day  metoprolol succinate 25 mg oral tablet, extended release: 1 tab(s) orally once a day  Multiple Vitamins oral tablet: 1 tab(s) orally once a day  Normal Saline 0.9% 10ml Pre/Post Infusion:   oxyCODONE 10 mg oral tablet: 1 tab(s) orally every 3 hours, As needed, Severe Pain (7 - 10)  oxyCODONE 5 mg oral tablet: 1 tab(s) orally every 3 hours, As needed, Moderate Pain (4 - 6)  pantoprazole 40 mg oral delayed release tablet: 1 tab(s) orally once a day (before a meal)  prasugrel 10 mg oral tablet: 1 tab(s) orally once a day  senna oral tablet: 2 tab(s) orally once a day (at bedtime)  simethicone 80 mg oral tablet, chewable: 1 tab(s) orally once a day, As needed, Gas  spironolactone 25 mg oral tablet: 1 tab(s) orally once a day  Weekly labs: CMP, CBC, ESR, CRP, CK faxed to ID office at [phone]: Weekly labs: CMP, CBC, ESR, CRP, CK faxed to ID office at [phone]  - Patient to follow up with Dr. Casillas in 2 weeks ([address] [place] [address], [phone]

## 2022-01-07 NOTE — DISCHARGE NOTE PROVIDER - PROVIDER TOKENS
PROVIDER:[TOKEN:[[id number]]] PROVIDER:[TOKEN:[[id number]]],PROVIDER:[TOKEN:[[id number]]],PROVIDER:[TOKEN:[[id number]]] PROVIDER:[TOKEN:[[id number]]],PROVIDER:[TOKEN:[[id number]],SCHEDULEDAPPT:[02/04/2022],SCHEDULEDAPPTTIME:[12:15 PM]],PROVIDER:[TOKEN:[[id number]]]

## 2022-01-07 NOTE — DISCHARGE NOTE PROVIDER - NSDCFUSCHEDAPPT_GEN_ALL_CORE_FT
FLOWER MARISCAL ; 01/21/2022 ; [facility] [address]  FLOWER MARISCAL ; 02/08/2022 ; [facility] [address] FLOWER MARISCAL ; 02/08/2022 ; [facility] [address]

## 2022-01-07 NOTE — DISCHARGE NOTE PROVIDER - NSDCCPCAREPLAN_GEN_ALL_CORE_FT
PRINCIPAL DISCHARGE DIAGNOSIS  Diagnosis: Infection of prosthetic knee joint  Assessment and Plan of Treatment:        PRINCIPAL DISCHARGE DIAGNOSIS  Diagnosis: Infection of prosthetic knee joint  Assessment and Plan of Treatment:       SECONDARY DISCHARGE DIAGNOSES  Diagnosis: Anemia due to blood loss  Assessment and Plan of Treatment:     Diagnosis: MRSA bacteremia  Assessment and Plan of Treatment:     Diagnosis: Sepsis  Assessment and Plan of Treatment:     Diagnosis: Hypothyroidism  Assessment and Plan of Treatment:     Diagnosis: Type 2 diabetes mellitus  Assessment and Plan of Treatment:     Diagnosis: Chronic systolic congestive heart failure  Assessment and Plan of Treatment:     Diagnosis: Anxiety  Assessment and Plan of Treatment:     Diagnosis: Anxiety and depression  Assessment and Plan of Treatment:     Diagnosis: CAD S/P percutaneous coronary angioplasty  Assessment and Plan of Treatment:     Diagnosis: HTN (hypertension)  Assessment and Plan of Treatment:     Diagnosis: HLD (hyperlipidemia)  Assessment and Plan of Treatment:     Diagnosis: Presence of biventricular AICD  Assessment and Plan of Treatment:     Diagnosis: History of coronary artery bypass surgery  Assessment and Plan of Treatment:     Diagnosis: Diabetic polyneuropathies  Assessment and Plan of Treatment:

## 2022-01-07 NOTE — DISCHARGE NOTE PROVIDER - HOSPITAL COURSE
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 He was taken to OR with orthopedic surgery on Revision gastroc flap by PRS, antibiotic cement spacer removal, I&D, replacement on 1/6, then taken to OR with vascular on 1/10 for right AKA. taken to OR for completion AKA today, but hypotensive on induction, required pressors, procedure aborted, kept intubated and sent to SICU.    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 On 1/6 He was taken to OR with orthopedic/plastic surgery for I&D, antibiotic cement spacer removal, replacement and revision gastroc flap. He was then subsequently transferred to the vascular surgery service. On 1/10, he underwent right guillotine AKA which he tolerated well. On 1/14 he was taken to OR for completion/closure for AKA, but the procedure was aborted as he became hypotensive on induction and required pressors. He was sent to the SICU intubated. He was later weaned off pressors, extubated and stepped down from SICU. On 1/18, went to OR again for closure of R AKA. he tolerated the procedure well and was transferred to the PACU in stable condition.  He will be discharged to acute rehab and 4 weeks of IV daptomycin via PICC line.    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 On 1/6 He was taken to OR with orthopedic/plastic surgery for I&D, antibiotic cement spacer removal, replacement and revision gastroc flap. He was then subsequently transferred to the vascular surgery service. On 1/10, he underwent right guillotine AKA which he tolerated well. On 1/14 he was taken to OR for completion/closure for AKA, but the procedure was aborted as he became hypotensive on induction and required pressors. He was sent to the SICU intubated. He was later weaned off pressors, extubated and stepped down from SICU. On 1/18, went to OR again for closure of R AKA. he tolerated the procedure well and was transferred to the PACU in stable condition.  He will be discharged to acute rehab and 4 weeks of IV daptomycin via PICC line until 2/7/22

## 2022-01-07 NOTE — DISCHARGE NOTE PROVIDER - NSDCCPTREATMENT_GEN_ALL_CORE_FT
PRINCIPAL PROCEDURE  Procedure: Removal of antibiotic spacer from knee  Findings and Treatment:       SECONDARY PROCEDURE  Procedure: Replacement, antibiotic spacer  Findings and Treatment:      PRINCIPAL PROCEDURE  Procedure: Above knee amputation  Findings and Treatment:       SECONDARY PROCEDURE  Procedure: Removal of antibiotic spacer from knee  Findings and Treatment:     Procedure: Replacement, antibiotic spacer  Findings and Treatment:

## 2022-01-07 NOTE — PROGRESS NOTE ADULT - SUBJECTIVE AND OBJECTIVE BOX
Pain Management Progress Note - [facility] [phone]    HPI: Patient seen and examined today. Patient POD 1 s/p right knee explant/spacer exchange. Patient reports endorsing pain to the right knee. Patient reports improvement of pain with PO Oxycodone. Patient denies cp/sob/numbness/tingling. Patient reports feeling a bit tired but denies other side effects from current pain regimen.    Pertinent PMH: Pain at: ___Back ___Neck_X__Knee ___Hip ___Shoulder ___ Opioid tolerance    Pain is _X__ sharp ____dull ___burning ___achy ___ Intensity: ____ mild __X__mod __X__severe     Location ___X__surgical site _____cervical _____lumbar ____abd _____upper ext__X__lower ext    Worse with __X__activity ___X_movement _____physical therapy___ Rest    Improved with ___X_medication _X___rest ____physical therapy    PMH:  Status post percutaneous transluminal coronary angioplasty  2 stents  Atherosclerosis of coronary artery  CAD (coronary artery disease)  Osteoarthritis  HLD (hyperlipidemia)  Blood clot due to device, implant, or graft  was on blood thinners  Diabetes  on insulin pump  HTN (hypertension)  CHF (congestive heart failure)  EF ~ 25%  History of celiac disease  Diverticulitis  STEMI (ST elevation myocardial infarction)  Diabetic neuropathy  Anxiety and depression  Other postprocedural status  Fixation hardware in foot LEFT  Stented coronary artery  10/18 heart attack  INFERIOR WALL MI  S/P CABG x 1  2018  S/P TKR (total knee replacement), right  with infection Mrsa   per pt he was cleared from MRSA infection  Surgery, elective  right knee wound debridement  History of open reduction and internal fixation (ORIF) procedure  H/O shoulder surgery  right  AICD (automatic cardioverter/defibrillator) present  Cholecystostomy care  drain inserted 2020 &amp; removed 4 months ago  History of tonsillectomy  History of hip replacement, total, right    Medications:  vancomycin  IVPB  enoxaparin Injectable  HYDROmorphone  Injectable  oxyCODONE    IR  oxyCODONE    IR  traMADol  oxyCODONE    IR  HYDROmorphone  Injectable  HYDROmorphone  Injectable  HYDROmorphone  Injectable  lactated ringers.  tobramycin Injectable  vancomycin  IVPB  cyclobenzaprine  acetaminophen     Tablet ..  oxyCODONE    IR  HYDROmorphone  Injectable  HYDROmorphone  Injectable  vancomycin  IVPB  vancomycin  IVPB  chlorhexidine 2% Cloths  povidone iodine 5% Nasal Swab  insulin glargine Injectable (LANTUS)  vancomycin  IVPB  glucagon  Injectable  dextrose 5%.  dextrose 50% Injectable  dextrose 50% Injectable  dextrose 50% Injectable  dextrose 5%.  dextrose 40% Gel  insulin lispro (ADMELOG) corrective regimen sliding scale  levothyroxine  pantoprazole    Tablet  furosemide    Tablet  ALPRAZolam  rifAMPin  atorvastatin  DULoxetine  aluminum hydroxide/magnesium hydroxide/simethicone Suspension  spironolactone  lactated ringers.  HYDROmorphone  Injectable  oxyCODONE    IR  oxyCODONE    IR    ROS: Const:  __N_febrile   Eyes:___ENT:___CV: __N_chest pain  Resp: ___N_sob  GI:__N_nausea N___vomiting __N__abd pain ___npo ___clears Y___full diet __bm  :___ Musk: _Y__pain ___spasm  Skin:___ Neuro:  __N_sedation___confusion__N__ numbness ___weakness _N__paresthesia  Psych:___anxiety  Endo:___ Heme:___Allergy:_carvedilol, enalapril, entresto, ACEi__      01-07 @ 07:0490 mL/min/1.73M2  Hemoglobin: 11.3 g/dL (01-07 @ 07:04)  Hemoglobin: 11.9 g/dL (01-06 @ 06:24)  T(C): 36.4 (01-07-22 @ 12:17), Max: 37.3 (01-06-22 @ 14:35)  HR: 86 (01-07-22 @ 12:17) (78 - 101)  BP: 137/70 (01-07-22 @ 12:17) (104/59 - 143/59)  RR: 17 (01-07-22 @ 12:17) (12 - 23)  SpO2: 95% (01-07-22 @ 12:17) (93% - 99%)  Wt(kg): --     PHYSICAL EXAM:  Gen Appearance: _X__no acute distress _X_appropriate       Neuro: ___SILT feet____ EOM Intact Psych: AAOX_3_, __X_mood/affect appropriate        Eyes: _X__conjunctiva WNL  ___X__ Pupils equal and round        ENT: __X_ears and nose atraumatic__X_ Hearing grossly intact        Neck: _X__trachea midline, no visible masses ___thyroid without palpable mass    Resp: _X__Nml WOB____No tactile fremitus ___clear to auscultation    Cardio: _X__extremities free from edema ____pedal pulses palpable    GI/Abdomen: ___soft _____ Nontender_____X_Nondistended_____HSM    Lymphatic: ___no palpable nodes in neck  ___no palpable nodes calves and feet    Skin/Wound: ___Incision, _X__Dressing c/d/i,   ____surrounding tissues soft,  ___drain/chest tube present____    Muscular: EHL ___/5  Gastrocnemius___/5    _X__absent clubbing/cyanosis         ASSESSMENT:  This is a 66y old Male with a history of diabetes, CHF, HLD, HTN, STEMI, s/p right knee explant/spacer exchange 0/2020 scheduled for repeat explant, spacer exchange and revision of gastric flap, with some pain to the right knee.    Recommended Treatment PLAN:  1. Oxycodone 5-10 mg PO q4h PRN moderate-severe pain   2. Dilaudid 0.5 mg IVP q3h PRN breakthrough pain  3. Flexeril 5 mg PO q8h PRN muscle spasm  4. Tylenol 1000 mg PO TID  Plan discussed with Dr. Max        Pain Management Progress Note - [facility] [phone]    HPI: Patient seen and examined today. Patient POD 1 s/p right knee explant/spacer exchange. Patient reports endorsing pain to the right knee. Patient reports improvement of pain with PO Oxycodone. Patient denies cp/sob/numbness/tingling. Patient reports feeling a bit tired but denies other side effects from current pain regimen.    Pertinent PMH: Pain at: ___Back ___Neck_X__Knee ___Hip ___Shoulder ___ Opioid tolerance    Pain is _X__ sharp ____dull ___burning ___achy ___ Intensity: ____ mild __X__mod __X__severe     Location ___X__surgical site _____cervical _____lumbar ____abd _____upper ext__X__lower ext    Worse with __X__activity ___X_movement _____physical therapy___ Rest    Improved with ___X_medication _X___rest ____physical therapy    PMH:  Status post percutaneous transluminal coronary angioplasty  2 stents  Atherosclerosis of coronary artery  CAD (coronary artery disease)  Osteoarthritis  HLD (hyperlipidemia)  Blood clot due to device, implant, or graft  was on blood thinners  Diabetes  on insulin pump  HTN (hypertension)  CHF (congestive heart failure)  EF ~ 25%  History of celiac disease  Diverticulitis  STEMI (ST elevation myocardial infarction)  Diabetic neuropathy  Anxiety and depression  Other postprocedural status  Fixation hardware in foot LEFT  Stented coronary artery  10/18 heart attack  INFERIOR WALL MI  S/P CABG x 1  2018  S/P TKR (total knee replacement), right  with infection Mrsa   per pt he was cleared from MRSA infection  Surgery, elective  right knee wound debridement  History of open reduction and internal fixation (ORIF) procedure  H/O shoulder surgery  right  AICD (automatic cardioverter/defibrillator) present  Cholecystostomy care  drain inserted 2020 &amp; removed 4 months ago  History of tonsillectomy  History of hip replacement, total, right    Medications:  vancomycin  IVPB  enoxaparin Injectable  HYDROmorphone  Injectable  oxyCODONE    IR  oxyCODONE    IR  traMADol  oxyCODONE    IR  HYDROmorphone  Injectable  HYDROmorphone  Injectable  HYDROmorphone  Injectable  lactated ringers.  tobramycin Injectable  vancomycin  IVPB  cyclobenzaprine  acetaminophen     Tablet ..  oxyCODONE    IR  HYDROmorphone  Injectable  HYDROmorphone  Injectable  vancomycin  IVPB  vancomycin  IVPB  chlorhexidine 2% Cloths  povidone iodine 5% Nasal Swab  insulin glargine Injectable (LANTUS)  vancomycin  IVPB  glucagon  Injectable  dextrose 5%.  dextrose 50% Injectable  dextrose 50% Injectable  dextrose 50% Injectable  dextrose 5%.  dextrose 40% Gel  insulin lispro (ADMELOG) corrective regimen sliding scale  levothyroxine  pantoprazole    Tablet  furosemide    Tablet  ALPRAZolam  rifAMPin  atorvastatin  DULoxetine  aluminum hydroxide/magnesium hydroxide/simethicone Suspension  spironolactone  lactated ringers.  HYDROmorphone  Injectable  oxyCODONE    IR  oxyCODONE    IR    ROS: Const:  __N_febrile   Eyes:___ENT:___CV: __N_chest pain  Resp: ___N_sob  GI:__N_nausea N___vomiting __N__abd pain ___npo ___clears Y___full diet __bm  :___ Musk: _Y__pain ___spasm  Skin:___ Neuro:  __N_sedation___confusion__N__ numbness ___weakness _N__paresthesia  Psych:___anxiety  Endo:___ Heme:___Allergy:_carvedilol, enalapril, entresto, ACEi__      01-07 @ 07:0490 mL/min/1.73M2  Hemoglobin: 11.3 g/dL (01-07 @ 07:04)  Hemoglobin: 11.9 g/dL (01-06 @ 06:24)  T(C): 36.4 (01-07-22 @ 12:17), Max: 37.3 (01-06-22 @ 14:35)  HR: 86 (01-07-22 @ 12:17) (78 - 101)  BP: 137/70 (01-07-22 @ 12:17) (104/59 - 143/59)  RR: 17 (01-07-22 @ 12:17) (12 - 23)  SpO2: 95% (01-07-22 @ 12:17) (93% - 99%)  Wt(kg): --     PHYSICAL EXAM:  Gen Appearance: _X__no acute distress _X_appropriate       Neuro: ___SILT feet____ EOM Intact Psych: AAOX_3_, __X_mood/affect appropriate        Eyes: _X__conjunctiva WNL  ___X__ Pupils equal and round        ENT: __X_ears and nose atraumatic__X_ Hearing grossly intact        Neck: _X__trachea midline, no visible masses ___thyroid without palpable mass    Resp: _X__Nml WOB____No tactile fremitus ___clear to auscultation    Cardio: _X__extremities free from edema ____pedal pulses palpable    GI/Abdomen: ___soft _____ Nontender_____X_Nondistended_____HSM    Lymphatic: ___no palpable nodes in neck  ___no palpable nodes calves and feet    Skin/Wound: ___Incision, _X__Dressing c/d/i,   ____surrounding tissues soft,  ___drain/chest tube present____    Muscular: EHL ___/5  Gastrocnemius___/5    _X__absent clubbing/cyanosis

## 2022-01-07 NOTE — CONSULT NOTE ADULT - ASSESSMENT
66M hx of CAD with CABG , CHF (EF 20-25%) with AICD, T2DM c/b neuropathy with hx of diabetic ulcer, HLD. HTN, COPD, diverticulitis, celiac disease, anxiety and depression, cholecystostomy, R hip replacement 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 Castro (explant and abx spacer exchange), transferred here from [facility] for recurrent PJI for repeat spacer exchange and possible flap coverage with Dr. Vaughn/Dr. Bowen. Pt has been experiencing atraumatic worsening R knee pain x 1 month. Notes he went to a procedure center where his knee was aspirated 3 weeks ago. Pt was seen at [facility] yesterday where his knee was aspirated with 283k cell count. WBC 14, ESR 78, +, AVSS. Pt started on vancomycin + rifampin. 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 Now s/p Replacement, antibiotic spacer 06-Jan-2022. Also with hyperglycemia.      A1C: 9.0%  Weight: 97kg BMI 28  Cr/GFR: 0.9/104  EF: 20-25%

## 2022-01-08 DIAGNOSIS — R78.81 BACTEREMIA: ICD-10-CM

## 2022-01-08 DIAGNOSIS — E10.9 TYPE 1 DIABETES MELLITUS WITHOUT COMPLICATIONS: ICD-10-CM

## 2022-01-08 DIAGNOSIS — E10.42 TYPE 1 DIABETES MELLITUS WITH DIABETIC POLYNEUROPATHY: ICD-10-CM

## 2022-01-08 DIAGNOSIS — M00.9 PYOGENIC ARTHRITIS, UNSPECIFIED: ICD-10-CM

## 2022-01-08 DIAGNOSIS — A41.9 SEPSIS, UNSPECIFIED ORGANISM: ICD-10-CM

## 2022-01-08 DIAGNOSIS — Z98.890 OTHER SPECIFIED POSTPROCEDURAL STATES: ICD-10-CM

## 2022-01-08 DIAGNOSIS — I10 ESSENTIAL (PRIMARY) HYPERTENSION: ICD-10-CM

## 2022-01-08 DIAGNOSIS — D50.0 IRON DEFICIENCY ANEMIA SECONDARY TO BLOOD LOSS (CHRONIC): ICD-10-CM

## 2022-01-08 DIAGNOSIS — E78.1 PURE HYPERGLYCERIDEMIA: ICD-10-CM

## 2022-01-08 DIAGNOSIS — I25.10 ATHEROSCLEROTIC HEART DISEASE OF NATIVE CORONARY ARTERY WITHOUT ANGINA PECTORIS: ICD-10-CM

## 2022-01-08 LAB
-  AMPICILLIN/SULBACTAM: SIGNIFICANT CHANGE UP
-  CEFAZOLIN: SIGNIFICANT CHANGE UP
-  CLINDAMYCIN: SIGNIFICANT CHANGE UP
-  DAPTOMYCIN: SIGNIFICANT CHANGE UP
-  ERYTHROMYCIN: SIGNIFICANT CHANGE UP
-  GENTAMICIN: SIGNIFICANT CHANGE UP
-  LINEZOLID: SIGNIFICANT CHANGE UP
-  OXACILLIN: SIGNIFICANT CHANGE UP
-  PENICILLIN: SIGNIFICANT CHANGE UP
-  RIFAMPIN: SIGNIFICANT CHANGE UP
-  TETRACYCLINE: SIGNIFICANT CHANGE UP
-  TRIMETHOPRIM/SULFAMETHOXAZOLE: SIGNIFICANT CHANGE UP
-  VANCOMYCIN: SIGNIFICANT CHANGE UP
ANION GAP SERPL CALC-SCNC: 9 MMOL/L — SIGNIFICANT CHANGE UP (ref 5–17)
BUN SERPL-MCNC: 17 MG/DL — SIGNIFICANT CHANGE UP (ref 7–23)
CALCIUM SERPL-MCNC: 8.8 MG/DL — SIGNIFICANT CHANGE UP (ref 8.4–10.5)
CHLORIDE SERPL-SCNC: 100 MMOL/L — SIGNIFICANT CHANGE UP (ref 96–108)
CO2 SERPL-SCNC: 28 MMOL/L — SIGNIFICANT CHANGE UP (ref 22–31)
CREAT SERPL-MCNC: 0.87 MG/DL — SIGNIFICANT CHANGE UP (ref 0.5–1.3)
CULTURE RESULTS: SIGNIFICANT CHANGE UP
CULTURE RESULTS: SIGNIFICANT CHANGE UP
GLUCOSE SERPL-MCNC: 119 MG/DL — HIGH (ref 70–99)
HCT VFR BLD CALC: 33.3 % — LOW (ref 39–50)
HGB BLD-MCNC: 10.5 G/DL — LOW (ref 13–17)
MCHC RBC-ENTMCNC: 24.9 PG — LOW (ref 27–34)
MCHC RBC-ENTMCNC: 31.5 GM/DL — LOW (ref 32–36)
MCV RBC AUTO: 79.1 FL — LOW (ref 80–100)
METHOD TYPE: SIGNIFICANT CHANGE UP
NRBC # BLD: 0 /100 WBCS — SIGNIFICANT CHANGE UP (ref 0–0)
ORGANISM # SPEC MICROSCOPIC CNT: SIGNIFICANT CHANGE UP
PLATELET # BLD AUTO: 248 K/UL — SIGNIFICANT CHANGE UP (ref 150–400)
POTASSIUM SERPL-MCNC: 3.8 MMOL/L — SIGNIFICANT CHANGE UP (ref 3.5–5.3)
POTASSIUM SERPL-SCNC: 3.8 MMOL/L — SIGNIFICANT CHANGE UP (ref 3.5–5.3)
RBC # BLD: 4.21 M/UL — SIGNIFICANT CHANGE UP (ref 4.2–5.8)
RBC # FLD: 17.5 % — HIGH (ref 10.3–14.5)
SODIUM SERPL-SCNC: 137 MMOL/L — SIGNIFICANT CHANGE UP (ref 135–145)
SPECIMEN SOURCE: SIGNIFICANT CHANGE UP
SPECIMEN SOURCE: SIGNIFICANT CHANGE UP
T4 FREE SERPL-MCNC: 0.94 NG/DL — SIGNIFICANT CHANGE UP (ref 0.93–1.7)
TSH SERPL-MCNC: 2.85 UIU/ML — SIGNIFICANT CHANGE UP (ref 0.27–4.2)
VANCOMYCIN TROUGH SERPL-MCNC: 22 UG/ML — HIGH (ref 10–20)
WBC # BLD: 12.16 K/UL — HIGH (ref 3.8–10.5)
WBC # FLD AUTO: 12.16 K/UL — HIGH (ref 3.8–10.5)

## 2022-01-08 PROCEDURE — 99232 SBSQ HOSP IP/OBS MODERATE 35: CPT

## 2022-01-08 RX ORDER — VANCOMYCIN HCL 1 G
1000 VIAL (EA) INTRAVENOUS ONCE
Refills: 0 | Status: COMPLETED | OUTPATIENT
Start: 2022-01-09 | End: 2022-01-09

## 2022-01-08 RX ORDER — VANCOMYCIN HCL 1 G
1000 VIAL (EA) INTRAVENOUS EVERY 12 HOURS
Refills: 0 | Status: DISCONTINUED | OUTPATIENT
Start: 2022-01-09 | End: 2022-01-11

## 2022-01-08 RX ORDER — INSULIN LISPRO 100/ML
3 VIAL (ML) SUBCUTANEOUS
Refills: 0 | Status: DISCONTINUED | OUTPATIENT
Start: 2022-01-08 | End: 2022-01-10

## 2022-01-08 RX ADMIN — ATORVASTATIN CALCIUM 80 MILLIGRAM(S): 80 TABLET, FILM COATED ORAL at 22:33

## 2022-01-08 RX ADMIN — OXYCODONE HYDROCHLORIDE 10 MILLIGRAM(S): 5 TABLET ORAL at 04:37

## 2022-01-08 RX ADMIN — ENOXAPARIN SODIUM 30 MILLIGRAM(S): 100 INJECTION SUBCUTANEOUS at 18:30

## 2022-01-08 RX ADMIN — HYDROMORPHONE HYDROCHLORIDE 0.5 MILLIGRAM(S): 2 INJECTION INTRAMUSCULAR; INTRAVENOUS; SUBCUTANEOUS at 06:06

## 2022-01-08 RX ADMIN — ENOXAPARIN SODIUM 30 MILLIGRAM(S): 100 INJECTION SUBCUTANEOUS at 06:09

## 2022-01-08 RX ADMIN — INSULIN GLARGINE 20 UNIT(S): 100 INJECTION, SOLUTION SUBCUTANEOUS at 22:37

## 2022-01-08 RX ADMIN — HYDROMORPHONE HYDROCHLORIDE 0.5 MILLIGRAM(S): 2 INJECTION INTRAMUSCULAR; INTRAVENOUS; SUBCUTANEOUS at 13:22

## 2022-01-08 RX ADMIN — HYDROMORPHONE HYDROCHLORIDE 0.5 MILLIGRAM(S): 2 INJECTION INTRAMUSCULAR; INTRAVENOUS; SUBCUTANEOUS at 13:37

## 2022-01-08 RX ADMIN — OXYCODONE HYDROCHLORIDE 10 MILLIGRAM(S): 5 TABLET ORAL at 11:39

## 2022-01-08 RX ADMIN — Medication 25 MICROGRAM(S): at 06:08

## 2022-01-08 RX ADMIN — Medication 1000 MILLIGRAM(S): at 14:00

## 2022-01-08 RX ADMIN — Medication 3 UNIT(S): at 17:24

## 2022-01-08 RX ADMIN — Medication 1000 MILLIGRAM(S): at 06:07

## 2022-01-08 RX ADMIN — Medication 1000 MILLIGRAM(S): at 13:22

## 2022-01-08 RX ADMIN — OXYCODONE HYDROCHLORIDE 10 MILLIGRAM(S): 5 TABLET ORAL at 04:07

## 2022-01-08 RX ADMIN — Medication 1000 MILLIGRAM(S): at 22:33

## 2022-01-08 RX ADMIN — Medication 1000 MILLIGRAM(S): at 06:36

## 2022-01-08 RX ADMIN — DULOXETINE HYDROCHLORIDE 20 MILLIGRAM(S): 30 CAPSULE, DELAYED RELEASE ORAL at 11:08

## 2022-01-08 RX ADMIN — PANTOPRAZOLE SODIUM 40 MILLIGRAM(S): 20 TABLET, DELAYED RELEASE ORAL at 06:07

## 2022-01-08 RX ADMIN — Medication 166.67 MILLIGRAM(S): at 11:08

## 2022-01-08 RX ADMIN — Medication 40 MILLIGRAM(S): at 06:08

## 2022-01-08 RX ADMIN — HYDROMORPHONE HYDROCHLORIDE 0.5 MILLIGRAM(S): 2 INJECTION INTRAMUSCULAR; INTRAVENOUS; SUBCUTANEOUS at 06:36

## 2022-01-08 RX ADMIN — OXYCODONE HYDROCHLORIDE 10 MILLIGRAM(S): 5 TABLET ORAL at 11:09

## 2022-01-08 RX ADMIN — Medication 1000 MILLIGRAM(S): at 23:30

## 2022-01-08 NOTE — PROGRESS NOTE ADULT - ASSESSMENT
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 Castro (explant and abx spacer exchange), transferred here from [facility] for recurrent PJI for repeat spacer exchange and possible flap coverage with Dr. Vaughn/Dr. Bowen. Pt has been experiencing atraumatic worsening R knee pain x 1 month. Notes he went to a procedure center where his knee was aspirated 3 weeks ago. Pt was seen at [facility] yesterday where his knee was aspirated with 283k cell count. WBC 14, ESR 78, +, AVSS. Pt started on vancomycin + rifampin. 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  He is now s/p arthroscopic I&D in view of prior difficulties with his incision/wound and other co-morbidities.  Unfortunately, he was on cipro while this occurred - no organisms seen on initial gram stain.  Although infection at present may represent persistence from November (MRSA), cannot presume, particularly in view of ST compromise.  He has had extensive antibiotic exposure in the interim and has spent a long time on inpatient services.  He had transaminitis with rifampin in past - would not attempt to add unless Staph grows.   9 day s/p right knee arthroscopic I and D, 7/17/2020  4 days s/p right knee explant, 7/22/2020

## 2022-01-08 NOTE — PROGRESS NOTE ADULT - PROBLEM SELECTOR PROBLEM 2
Pyogenic arthritis of right knee joint, due to unspecified organism Type 1 diabetes mellitus without complication

## 2022-01-08 NOTE — PROVIDER CONTACT NOTE (CRITICAL VALUE NOTIFICATION) - BACKGROUND
s/p R Knee PJI explant/spacer exchange 09/2020 with recurrent PJI s/p repeat explant, spacer exchange and revision gastroc flap on 01-06

## 2022-01-08 NOTE — PROGRESS NOTE ADULT - SUBJECTIVE AND OBJECTIVE BOX
Patient seen and examined this morning. Pain reasonably well controlled. Leg wrapped, foot well perfused. Incisional VAC holding suction.     -Care per primary team    SCW

## 2022-01-08 NOTE — PROGRESS NOTE ADULT - SUBJECTIVE AND OBJECTIVE BOX
Ortho Progress Note    Procedure: Right TKA explant, spacer exchange and revision gastroc flap   Surgeon: Dr. JOSEPHINE Vaughn    Subjective:  Pain controlled with medication. Discussed plans for R AKA with vascular with patient. Patient understands plan is on board for that.   Denies CP, SOB, N/V, numbness/tingling.    Objective:  Vital Signs Last 24 Hrs  T(C): 36.8 (08 Jan 2022 08:45), Max: 37 (07 Jan 2022 20:30)  T(F): 98.2 (08 Jan 2022 08:45), Max: 98.6 (07 Jan 2022 20:30)  HR: 81 (08 Jan 2022 08:45) (81 - 96)  BP: 133/71 (08 Jan 2022 08:45) (121/70 - 138/76)  BP(mean): --  RR: 17 (08 Jan 2022 08:45) (17 - 18)  SpO2: 97% (08 Jan 2022 08:45) (95% - 97%)    AVSS    General: Pt Alert and oriented, NAD  RLE:  Dressing C/D/I, KI in place, VAC in place  Motor: EHL/FHL/TA/GS firing  Sensation: SILT throughout all nerve distributions  Pulses: Toes WWP    A/P: 66yMale s/p repeat explant, spacer exchange and revision gastroc flap on 01-06  - Plan for OR with Vascular Monday for R AKA   - Follow up gallium scan and TTE, appreciate ID recs   - Pain Control  - DVT ppx: SCDs, Lovenox 30mg bid  - Post op abx: vancomycin 1250mg q12h  - Resume home meds  - PT, WBS: TTWB RLE     Ortho Pager [phone] Ortho Progress Note    Procedure: Right TKA explant, spacer exchange and revision gastroc flap   Surgeon: Dr. JOSEPHINE Vaughn    Subjective:  Pain controlled with medication. Discussed plans for R AKA with vascular with patient. Patient understands plan is on board for that.   Denies CP, SOB, N/V, numbness/tingling.    Objective:  Vital Signs Last 24 Hrs  T(C): 36.8 (08 Jan 2022 08:45), Max: 37 (07 Jan 2022 20:30)  T(F): 98.2 (08 Jan 2022 08:45), Max: 98.6 (07 Jan 2022 20:30)  HR: 81 (08 Jan 2022 08:45) (81 - 96)  BP: 133/71 (08 Jan 2022 08:45) (121/70 - 138/76)  BP(mean): --  RR: 17 (08 Jan 2022 08:45) (17 - 18)  SpO2: 97% (08 Jan 2022 08:45) (95% - 97%)    AVSS    General: Pt Alert and oriented, NAD  RLE:  Dressing C/D/I, KI in place, VAC in place  Motor: EHL/FHL/TA/GS firing  Sensation: SILT throughout all nerve distributions  Pulses: Toes WWP    A/P: 66yMale s/p repeat explant, spacer exchange and revision gastroc flap on 01-06  - Plan for OR with Vascular Monday for R AKA   - Follow up gallium scan and JOHN PAUL, appreciate ID recs   - Pain Control  - DVT ppx: SCDs, Lovenox 30mg bid  - Post op abx: vancomycin 1250mg q12h  - Resume home meds  - PT, WBS: TTWB RLE     Ortho Pager [phone]

## 2022-01-08 NOTE — PROGRESS NOTE ADULT - ASSESSMENT
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   now s/p antibiotic cement spacer exchange, I&D and revision gastroc flap by PRS on 1/6.  Patient already had multiple recurrence and he had flank pus in his R knee.  Currently evaluated for R AKA.      Recommend:  1.  Continue vancomycin 1250mg IV q12hr.  Check trough 30 minutes before tonight's dose  2.  Obtain JOHN PAUL to r/o endocarditis  3.  Obtain gallium scan to r/o ICD infection  4.  Follow up OR culture   5.  Follow up blood cultures    ID team 2 will follow. I will cover the service this weekend

## 2022-01-08 NOTE — PROGRESS NOTE ADULT - SUBJECTIVE AND OBJECTIVE BOX
Interval Events: Reviewed  Patient seen and examined at bedside.    Patient is a 66y old  Male who presents with a chief complaint of R Knee Pain (07 Jan 2022 15:31)      PAST MEDICAL & SURGICAL HISTORY:  Status post percutaneous transluminal coronary angioplasty  2 stents    Atherosclerosis of coronary artery  CAD (coronary artery disease)    Osteoarthritis    HLD (hyperlipidemia)    Blood clot due to device, implant, or graft  was on blood thinners    Diabetes  on insulin pump    HTN (hypertension)    CHF (congestive heart failure)  EF ~ 25%    History of celiac disease    Diverticulitis    STEMI (ST elevation myocardial infarction)    Diabetic neuropathy    Anxiety and depression    Preoperative clearance    Other postprocedural status  Fixation hardware in foot LEFT    Stented coronary artery  10/18 heart attack  INFERIOR WALL MI    S/P CABG x 1  2018    S/P TKR (total knee replacement), right  with infection Mrsa   per pt he was cleared from MRSA infection    Surgery, elective  right knee wound debridement    History of open reduction and internal fixation (ORIF) procedure    H/O shoulder surgery  right    AICD (automatic cardioverter/defibrillator) present    Cholecystostomy care  drain inserted 2020 &amp; removed 4 months ago    History of tonsillectomy    History of hip replacement, total, right        MEDICATIONS:  Pulmonary:    Antimicrobials:  vancomycin  IVPB 1250 milliGRAM(s) IV Intermittent every 12 hours    Anticoagulants:  enoxaparin Injectable 30 milliGRAM(s) SubCutaneous every 12 hours    Cardiac:  furosemide    Tablet 40 milliGRAM(s) Oral daily      Allergies    ACE inhibitors (Hives)  carvedilol (Other)  enalapril (Hives)  Entresto (Other)    Intolerances        Vital Signs Last 24 Hrs  T(C): 36.9 (08 Jan 2022 04:46), Max: 37.1 (07 Jan 2022 09:05)  T(F): 98.5 (08 Jan 2022 04:46), Max: 98.8 (07 Jan 2022 09:05)  HR: 83 (08 Jan 2022 04:46) (78 - 96)  BP: 121/70 (08 Jan 2022 04:46) (121/70 - 138/76)  BP(mean): --  RR: 17 (08 Jan 2022 04:46) (17 - 18)  SpO2: 97% (08 Jan 2022 04:46) (95% - 97%)    01-07 @ 07:01  -  01-08 @ 07:00  --------------------------------------------------------  IN: 2190 mL / OUT: 1525 mL / NET: 665 mL    01-08 @ 07:01  - 01-08 @ 09:02  --------------------------------------------------------  IN: 80 mL / OUT: 0 mL / NET: 80 mL          Review of Systems:   •	General: negative  •	Skin/Breast: negative  •	Ophthalmologic: negative  •	ENMT: negative  •	Respiratory and Thorax: negative  •	Cardiovascular: negative  •	Gastrointestinal: negative  •	Genitourinary: negative  •	Musculoskeletal: negative  •	Neurological: negative  •	Psychiatric: negative  •	Hematology/Lymphatics: negative  •	Endocrine: negative  •	Allergic/Immunologic: negative    Physical Exam:   • Constitutional:	Well-developed, well nourished  • Eyes:	EOMI; PERRL; no drainage or redness  • ENMT:	No oral lesions; no gross abnormalities  • Neck	No bruits; no thyromegaly or nodules  • Breasts:	not examined  • Back:	No deformity or limitation of movement  • Respiratory:	Breath Sounds equal & clear to percussion & auscultation, no accessory muscle use  • Cardiovascular:	Regular rate & rhythm, normal S1, S2; no murmurs, gallops or rubs; no S3, S4  • Gastrointestinal:	Soft, non-tender, no hepatosplenomegaly, normal bowel sounds  • Genitourinary:	not examined  • Rectal: not examined  • Extremities:	No cyanosis, clubbing or edema  • Vascular:	Equal and normal pulses (carotid, femoral, dorsalis pedis)  • Neurologica:l	not examined  • Skin:	No lesions; no rash  • Lymph Nodes:	No lymphadedenopathy  • Musculoskeletal:	No joint pain, swelling or deformity; no limitation of movement        LABS:      CBC Full  -  ( 07 Jan 2022 07:04 )  WBC Count : 15.42 K/uL  RBC Count : 4.57 M/uL  Hemoglobin : 11.3 g/dL  Hematocrit : 36.5 %  Platelet Count - Automated : 223 K/uL  Mean Cell Volume : 79.9 fl  Mean Cell Hemoglobin : 24.7 pg  Mean Cell Hemoglobin Concentration : 31.0 gm/dL  Auto Neutrophil # : x  Auto Lymphocyte # : x  Auto Monocyte # : x  Auto Eosinophil # : x  Auto Basophil # : x  Auto Neutrophil % : x  Auto Lymphocyte % : x  Auto Monocyte % : x  Auto Eosinophil % : x  Auto Basophil % : x    01-08    137  |  100  |  17  ----------------------------<  x   3.8   |  28  |  0.87    Ca    8.8      08 Jan 2022 08:25                      Culture Results:   No growth at 12 hours (01-07 @ 10:05)  Culture Results:   Testing in progress (01-07 @ 00:56)  Culture Results:   No growth to date (01-06 @ 19:13)  Culture Results:   No growth at 1 day. (01-06 @ 12:28)      RADIOLOGY & ADDITIONAL STUDIES (The following images were personally reviewed):  Loja:                                     No  Urine output:                       adequate  DVT prophylaxis:                 Yes  Flattus:                                  Yes  Bowel movement:              No   Interval Events: Reviewed  Patient seen and examined at bedside.    Patient is a 66y old  Male who presents with a chief complaint of R Knee Pain (07 Jan 2022 15:31)  no acute event overnight, RA 96%      PAST MEDICAL & SURGICAL HISTORY:  Status post percutaneous transluminal coronary angioplasty  2 stents    Atherosclerosis of coronary artery  CAD (coronary artery disease)    Osteoarthritis    HLD (hyperlipidemia)    Blood clot due to device, implant, or graft  was on blood thinners    Diabetes  on insulin pump    HTN (hypertension)    CHF (congestive heart failure)  EF ~ 25%    History of celiac disease    Diverticulitis    STEMI (ST elevation myocardial infarction)    Diabetic neuropathy    Anxiety and depression    Preoperative clearance    Other postprocedural status  Fixation hardware in foot LEFT    Stented coronary artery  10/18 heart attack  INFERIOR WALL MI    S/P CABG x 1  2018    S/P TKR (total knee replacement), right  with infection Mrsa   per pt he was cleared from MRSA infection    Surgery, elective  right knee wound debridement    History of open reduction and internal fixation (ORIF) procedure    H/O shoulder surgery  right    AICD (automatic cardioverter/defibrillator) present    Cholecystostomy care  drain inserted 2020 &amp; removed 4 months ago    History of tonsillectomy    History of hip replacement, total, right        MEDICATIONS:  Pulmonary:    Antimicrobials:  vancomycin  IVPB 1250 milliGRAM(s) IV Intermittent every 12 hours    Anticoagulants:  enoxaparin Injectable 30 milliGRAM(s) SubCutaneous every 12 hours    Cardiac:  furosemide    Tablet 40 milliGRAM(s) Oral daily      Allergies    ACE inhibitors (Hives)  carvedilol (Other)  enalapril (Hives)  Entresto (Other)    Intolerances        Vital Signs Last 24 Hrs  T(C): 36.9 (08 Jan 2022 04:46), Max: 37.1 (07 Jan 2022 09:05)  T(F): 98.5 (08 Jan 2022 04:46), Max: 98.8 (07 Jan 2022 09:05)  HR: 83 (08 Jan 2022 04:46) (78 - 96)  BP: 121/70 (08 Jan 2022 04:46) (121/70 - 138/76)  BP(mean): --  RR: 17 (08 Jan 2022 04:46) (17 - 18)  SpO2: 97% (08 Jan 2022 04:46) (95% - 97%)    01-07 @ 07:01  -  01-08 @ 07:00  --------------------------------------------------------  IN: 2190 mL / OUT: 1525 mL / NET: 665 mL    01-08 @ 07:01  - 01-08 @ 09:02  --------------------------------------------------------  IN: 80 mL / OUT: 0 mL / NET: 80 mL          Review of Systems:   •	General: negative  •	Skin/Breast: negative  •	Ophthalmologic: negative  •	ENMT: negative  •	Respiratory and Thorax: negative  •	Cardiovascular: negative  •	Gastrointestinal: negative  •	Genitourinary: negative  •	Musculoskeletal: negative  •	Neurological: negative  •	Psychiatric: negative  •	Hematology/Lymphatics: negative  •	Endocrine: negative  •	Allergic/Immunologic: negative    Physical Exam:   • Constitutional:	Well-developed, well nourished  • Eyes:	EOMI; PERRL; no drainage or redness  • ENMT:	No oral lesions; no gross abnormalities  • Neck	no thyromegaly or nodules  • Breasts:	not examined  • Back:	No deformity or limitation of movement  • Respiratory:	Breath Sounds equal & clear to auscultation, no accessory muscle use  • Cardiovascular:	Regular rate & rhythm, normal S1, S2; no murmurs, gallops or rubs; no S3, S4  • Gastrointestinal:	Soft, non-tender, no hepatosplenomegaly, normal bowel sounds  • Genitourinary:	not examined  • Rectal: not examined  • Extremities:	No cyanosis, clubbing or edema, right leg in knee immobilizer, dressing intact   • Vascular:	Equal and normal pulses (dorsalis pedis)  • Neurologica:l	not examined  • Skin:	No lesions; no rash  • Lymph Nodes:	No lymphadedenopathy  • Musculoskeletal:	No joint pain, swelling or deformity; no limitation of movement        LABS:      CBC Full  -  ( 07 Jan 2022 07:04 )  WBC Count : 15.42 K/uL  RBC Count : 4.57 M/uL  Hemoglobin : 11.3 g/dL  Hematocrit : 36.5 %  Platelet Count - Automated : 223 K/uL  Mean Cell Volume : 79.9 fl  Mean Cell Hemoglobin : 24.7 pg  Mean Cell Hemoglobin Concentration : 31.0 gm/dL  Auto Neutrophil # : x  Auto Lymphocyte # : x  Auto Monocyte # : x  Auto Eosinophil # : x  Auto Basophil # : x  Auto Neutrophil % : x  Auto Lymphocyte % : x  Auto Monocyte % : x  Auto Eosinophil % : x  Auto Basophil % : x    01-08    137  |  100  |  17  ----------------------------<  x   3.8   |  28  |  0.87    Ca    8.8      08 Jan 2022 08:25                      Culture Results:   No growth at 12 hours (01-07 @ 10:05)  Culture Results:   Testing in progress (01-07 @ 00:56)  Culture Results:   No growth to date (01-06 @ 19:13)  Culture Results:   No growth at 1 day. (01-06 @ 12:28)      RADIOLOGY & ADDITIONAL STUDIES (The following images were personally reviewed):  Loja:                                     No  Urine output:                       adequate  DVT prophylaxis:                 Yes  Flattus:                                  Yes  Bowel movement:              No

## 2022-01-08 NOTE — PROGRESS NOTE ADULT - SUBJECTIVE AND OBJECTIVE BOX
INTERVAL HPI/OVERNIGHT EVENTS:    Coverage for Dr. Ruffin (Team 2)    Patient was seen and examined at bedside.  No complaints    CONSTITUTIONAL:  Negative fever or chills, feels well, good appetite  EYES:  Negative  blurry vision or double vision  CARDIOVASCULAR:  Negative for chest pain or palpitations  RESPIRATORY:  Negative for cough, wheezing, or SOB   GASTROINTESTINAL:  Negative for nausea, vomiting, diarrhea, constipation, or abdominal pain  GENITOURINARY:  Negative frequency, urgency or dysuria  NEUROLOGIC:  No headache, confusion, dizziness, lightheadedness      ANTIBIOTICS/RELEVANT:    MEDICATIONS  (STANDING):  acetaminophen     Tablet .. 1000 milliGRAM(s) Oral every 8 hours  atorvastatin 80 milliGRAM(s) Oral at bedtime  dextrose 40% Gel 15 Gram(s) Oral once  dextrose 5%. 1000 milliLiter(s) (50 mL/Hr) IV Continuous <Continuous>  dextrose 5%. 1000 milliLiter(s) (100 mL/Hr) IV Continuous <Continuous>  dextrose 50% Injectable 25 Gram(s) IV Push once  dextrose 50% Injectable 12.5 Gram(s) IV Push once  dextrose 50% Injectable 25 Gram(s) IV Push once  DULoxetine 20 milliGRAM(s) Oral daily  enoxaparin Injectable 30 milliGRAM(s) SubCutaneous every 12 hours  furosemide    Tablet 40 milliGRAM(s) Oral daily  glucagon  Injectable 1 milliGRAM(s) IntraMuscular once  insulin glargine Injectable (LANTUS) 20 Unit(s) SubCutaneous at bedtime  insulin lispro (ADMELOG) corrective regimen sliding scale   SubCutaneous every 6 hours  insulin lispro Injectable (ADMELOG) 3 Unit(s) SubCutaneous three times a day before meals  lactated ringers. 1000 milliLiter(s) (80 mL/Hr) IV Continuous <Continuous>  levothyroxine 25 MICROGram(s) Oral daily  pantoprazole    Tablet 40 milliGRAM(s) Oral before breakfast  vancomycin  IVPB 1250 milliGRAM(s) IV Intermittent every 12 hours    MEDICATIONS  (PRN):  ALPRAZolam 0.5 milliGRAM(s) Oral every 8 hours PRN anxiety  aluminum hydroxide/magnesium hydroxide/simethicone Suspension 30 milliLiter(s) Oral every 4 hours PRN Dyspepsia  cyclobenzaprine 5 milliGRAM(s) Oral three times a day PRN Muscle Spasm  HYDROmorphone  Injectable 0.5 milliGRAM(s) IV Push every 15 minutes PRN pacu  HYDROmorphone  Injectable 0.5 milliGRAM(s) IV Push every 3 hours PRN breakthru  oxyCODONE    IR 5 milliGRAM(s) Oral every 4 hours PRN Moderate Pain (4 - 6)  oxyCODONE    IR 10 milliGRAM(s) Oral every 4 hours PRN Severe Pain (7 - 10)        Vital Signs Last 24 Hrs  T(C): 36.7 (08 Jan 2022 13:01), Max: 37 (07 Jan 2022 20:30)  T(F): 98 (08 Jan 2022 13:01), Max: 98.6 (07 Jan 2022 20:30)  HR: 69 (08 Jan 2022 13:01) (69 - 96)  BP: 134/76 (08 Jan 2022 13:01) (121/70 - 138/76)  BP(mean): --  RR: 17 (08 Jan 2022 13:01) (17 - 18)  SpO2: 100% (08 Jan 2022 13:01) (95% - 100%)    PHYSICAL EXAM:  Constitutional:Well-developed, well nourished  Eyes:DAMARIS, EOMI  Ear/Nose/Throat: no oral lesion, no sinus tenderness on percussion	  Neck: supple  Respiratory: CTA colton  Cardiovascular: S1S2 RRR, no murmurs  Gastrointestinal:soft, (+) BS, no HSM  Extremities:no e/e/c  Vascular: DP Pulse:	right normal; left normal      LABS:                        10.5   12.16 )-----------( 248      ( 08 Jan 2022 08:25 )             33.3     01-08    137  |  100  |  17  ----------------------------<  119<H>  3.8   |  28  |  0.87    Ca    8.8      08 Jan 2022 08:25            MICROBIOLOGY:    Culture - Blood (01.07.22 @ 10:05)    Specimen Source: .Blood Blood-Venous    Culture Results:   No growth at 1 day.    Culture - Body Fluid with Gram Stain (01.06.22 @ 19:13)    Gram Stain:   Few Gram Positive Cocci in Clusters  Numerous White blood cells    Specimen Source: .Body Fluid RIGHT KNEE SYNOVIAL FLUID    Culture Results:   Numerous Staphylococcus aureus  presumptive Methicillin resistant  Confirmation to follow within 24 hours  Result called to and read back byCuauhtemoc Sofia RN 01/08/2022 14:08:59        RADIOLOGY & ADDITIONAL STUDIES:

## 2022-01-08 NOTE — PROVIDER CONTACT NOTE (CRITICAL VALUE NOTIFICATION) - TEST AND RESULT REPORTED:
Body Fluid culture preliminary result, positive for numerous MRSA. Final results of blood culture growth in aerobic and anaerobic bottles are both positive for MRSA
blood cultures

## 2022-01-08 NOTE — PROGRESS NOTE ADULT - PROBLEM SELECTOR PROBLEM 3
Type 1 diabetes mellitus without complication Diabetic polyneuropathy associated with type 1 diabetes mellitus

## 2022-01-09 LAB
ANION GAP SERPL CALC-SCNC: 11 MMOL/L — SIGNIFICANT CHANGE UP (ref 5–17)
BUN SERPL-MCNC: 17 MG/DL — SIGNIFICANT CHANGE UP (ref 7–23)
CALCIUM SERPL-MCNC: 8.8 MG/DL — SIGNIFICANT CHANGE UP (ref 8.4–10.5)
CHLORIDE SERPL-SCNC: 96 MMOL/L — SIGNIFICANT CHANGE UP (ref 96–108)
CO2 SERPL-SCNC: 29 MMOL/L — SIGNIFICANT CHANGE UP (ref 22–31)
CREAT SERPL-MCNC: 0.92 MG/DL — SIGNIFICANT CHANGE UP (ref 0.5–1.3)
GLUCOSE SERPL-MCNC: 132 MG/DL — HIGH (ref 70–99)
HCT VFR BLD CALC: 33.8 % — LOW (ref 39–50)
HGB BLD-MCNC: 10.4 G/DL — LOW (ref 13–17)
MCHC RBC-ENTMCNC: 24 PG — LOW (ref 27–34)
MCHC RBC-ENTMCNC: 30.8 GM/DL — LOW (ref 32–36)
MCV RBC AUTO: 78.1 FL — LOW (ref 80–100)
NRBC # BLD: 0 /100 WBCS — SIGNIFICANT CHANGE UP (ref 0–0)
PLATELET # BLD AUTO: 285 K/UL — SIGNIFICANT CHANGE UP (ref 150–400)
POTASSIUM SERPL-MCNC: 3.2 MMOL/L — LOW (ref 3.5–5.3)
POTASSIUM SERPL-SCNC: 3.2 MMOL/L — LOW (ref 3.5–5.3)
RBC # BLD: 4.33 M/UL — SIGNIFICANT CHANGE UP (ref 4.2–5.8)
RBC # FLD: 17.7 % — HIGH (ref 10.3–14.5)
SARS-COV-2 RNA SPEC QL NAA+PROBE: SIGNIFICANT CHANGE UP
SODIUM SERPL-SCNC: 136 MMOL/L — SIGNIFICANT CHANGE UP (ref 135–145)
WBC # BLD: 12.03 K/UL — HIGH (ref 3.8–10.5)
WBC # FLD AUTO: 12.03 K/UL — HIGH (ref 3.8–10.5)

## 2022-01-09 PROCEDURE — 99232 SBSQ HOSP IP/OBS MODERATE 35: CPT

## 2022-01-09 PROCEDURE — 78802 RP LOCLZJ TUM WHBDY 1 D IMG: CPT | Mod: 26

## 2022-01-09 RX ORDER — POTASSIUM CHLORIDE 20 MEQ
20 PACKET (EA) ORAL
Refills: 0 | Status: COMPLETED | OUTPATIENT
Start: 2022-01-09 | End: 2022-01-09

## 2022-01-09 RX ORDER — SODIUM CHLORIDE 9 MG/ML
1000 INJECTION, SOLUTION INTRAVENOUS
Refills: 0 | Status: DISCONTINUED | OUTPATIENT
Start: 2022-01-09 | End: 2022-01-09

## 2022-01-09 RX ORDER — CHLORHEXIDINE GLUCONATE 213 G/1000ML
1 SOLUTION TOPICAL EVERY 12 HOURS
Refills: 0 | Status: COMPLETED | OUTPATIENT
Start: 2022-01-09 | End: 2022-01-10

## 2022-01-09 RX ORDER — SPIRONOLACTONE 25 MG/1
25 TABLET, FILM COATED ORAL DAILY
Refills: 0 | Status: DISCONTINUED | OUTPATIENT
Start: 2022-01-09 | End: 2022-01-14

## 2022-01-09 RX ORDER — POVIDONE-IODINE 5 %
1 AEROSOL (ML) TOPICAL ONCE
Refills: 0 | Status: DISCONTINUED | OUTPATIENT
Start: 2022-01-09 | End: 2022-01-14

## 2022-01-09 RX ORDER — SODIUM CHLORIDE 9 MG/ML
1000 INJECTION, SOLUTION INTRAVENOUS
Refills: 0 | Status: DISCONTINUED | OUTPATIENT
Start: 2022-01-09 | End: 2022-01-10

## 2022-01-09 RX ADMIN — Medication 1000 MILLIGRAM(S): at 14:18

## 2022-01-09 RX ADMIN — Medication 2: at 14:22

## 2022-01-09 RX ADMIN — Medication 3 UNIT(S): at 18:58

## 2022-01-09 RX ADMIN — ATORVASTATIN CALCIUM 80 MILLIGRAM(S): 80 TABLET, FILM COATED ORAL at 22:31

## 2022-01-09 RX ADMIN — Medication 2: at 00:56

## 2022-01-09 RX ADMIN — Medication 40 MILLIGRAM(S): at 06:22

## 2022-01-09 RX ADMIN — PANTOPRAZOLE SODIUM 40 MILLIGRAM(S): 20 TABLET, DELAYED RELEASE ORAL at 06:21

## 2022-01-09 RX ADMIN — ENOXAPARIN SODIUM 30 MILLIGRAM(S): 100 INJECTION SUBCUTANEOUS at 06:21

## 2022-01-09 RX ADMIN — Medication 250 MILLIGRAM(S): at 11:14

## 2022-01-09 RX ADMIN — OXYCODONE HYDROCHLORIDE 10 MILLIGRAM(S): 5 TABLET ORAL at 23:33

## 2022-01-09 RX ADMIN — Medication 3 UNIT(S): at 14:21

## 2022-01-09 RX ADMIN — DULOXETINE HYDROCHLORIDE 20 MILLIGRAM(S): 30 CAPSULE, DELAYED RELEASE ORAL at 11:14

## 2022-01-09 RX ADMIN — SPIRONOLACTONE 25 MILLIGRAM(S): 25 TABLET, FILM COATED ORAL at 15:09

## 2022-01-09 RX ADMIN — Medication 1000 MILLIGRAM(S): at 23:30

## 2022-01-09 RX ADMIN — HYDROMORPHONE HYDROCHLORIDE 0.5 MILLIGRAM(S): 2 INJECTION INTRAMUSCULAR; INTRAVENOUS; SUBCUTANEOUS at 16:54

## 2022-01-09 RX ADMIN — Medication 1000 MILLIGRAM(S): at 07:22

## 2022-01-09 RX ADMIN — Medication 25 MICROGRAM(S): at 06:21

## 2022-01-09 RX ADMIN — Medication 250 MILLIGRAM(S): at 00:57

## 2022-01-09 RX ADMIN — Medication 3 UNIT(S): at 06:22

## 2022-01-09 RX ADMIN — OXYCODONE HYDROCHLORIDE 10 MILLIGRAM(S): 5 TABLET ORAL at 12:14

## 2022-01-09 RX ADMIN — CHLORHEXIDINE GLUCONATE 1 APPLICATION(S): 213 SOLUTION TOPICAL at 17:26

## 2022-01-09 RX ADMIN — Medication 20 MILLIEQUIVALENT(S): at 13:18

## 2022-01-09 RX ADMIN — Medication 1000 MILLIGRAM(S): at 22:31

## 2022-01-09 RX ADMIN — Medication 4: at 18:59

## 2022-01-09 RX ADMIN — Medication 1000 MILLIGRAM(S): at 13:18

## 2022-01-09 RX ADMIN — CYCLOBENZAPRINE HYDROCHLORIDE 5 MILLIGRAM(S): 10 TABLET, FILM COATED ORAL at 03:03

## 2022-01-09 RX ADMIN — OXYCODONE HYDROCHLORIDE 10 MILLIGRAM(S): 5 TABLET ORAL at 11:14

## 2022-01-09 RX ADMIN — INSULIN GLARGINE 20 UNIT(S): 100 INJECTION, SOLUTION SUBCUTANEOUS at 22:31

## 2022-01-09 RX ADMIN — Medication 1000 MILLIGRAM(S): at 06:22

## 2022-01-09 RX ADMIN — OXYCODONE HYDROCHLORIDE 10 MILLIGRAM(S): 5 TABLET ORAL at 03:03

## 2022-01-09 RX ADMIN — OXYCODONE HYDROCHLORIDE 10 MILLIGRAM(S): 5 TABLET ORAL at 04:00

## 2022-01-09 RX ADMIN — Medication 20 MILLIEQUIVALENT(S): at 16:53

## 2022-01-09 NOTE — PROGRESS NOTE ADULT - SUBJECTIVE AND OBJECTIVE BOX
Subjective: Patient visited bedside by the vascular surgery team. Complains of excruciating right knee pain, responding partially to IV medication. Plan for AKA by Dr. Malloy tomorrow. Denies CP, SOB, N/V, numbness/tingling.      Social   vancomycin  IVPB 1000  furosemide    Tablet 40  spironolactone 25  vancomycin  IVPB 1000      Allergies    ACE inhibitors (Hives)  carvedilol (Other)  enalapril (Hives)  Entresto (Other)    Intolerances        Vital Signs Last 24 Hrs  T(C): 36.9 (09 Jan 2022 14:15), Max: 37 (08 Jan 2022 17:37)  T(F): 98.5 (09 Jan 2022 14:15), Max: 98.6 (08 Jan 2022 17:37)  HR: 89 (09 Jan 2022 14:15) (80 - 90)  BP: 120/69 (09 Jan 2022 14:15) (120/59 - 137/72)  BP(mean): 94 (09 Jan 2022 05:00) (94 - 94)  RR: 18 (09 Jan 2022 14:15) (17 - 18)  SpO2: 99% (09 Jan 2022 14:15) (96% - 99%)  I&O's Summary    08 Jan 2022 07:01  -  09 Jan 2022 07:00  --------------------------------------------------------  IN: 720 mL / OUT: 2675 mL / NET: -1955 mL    09 Jan 2022 07:01  -  09 Jan 2022 17:01  --------------------------------------------------------  IN: 0 mL / OUT: 600 mL / NET: -600 mL        Physical Exam:  General: Pt Alert and oriented, NAD  RLE:  Dressing C/D/I, KI in place, VAC in place  Motor: EHL/FHL/TA/GS firing  Sensation: SILT throughout all nerve distributions  Pulses: Toes WWP      LABS:                        10.4   12.03 )-----------( 285      ( 09 Jan 2022 11:14 )             33.8     01-09    136  |  96  |  17  ----------------------------<  132<H>  3.2<L>   |  29  |  0.92    Ca    8.8      09 Jan 2022 11:14          Radiology and Additional Studies:

## 2022-01-09 NOTE — PROGRESS NOTE ADULT - SUBJECTIVE AND OBJECTIVE BOX
Ortho Progress Note    Procedure: Right TKA explant, spacer exchange and revision gastroc flap   Surgeon: Dr. JOSEPHINE Vaughn    Subjective:  Pain controlled with medication. Plan for AKA tomorrow. Gallium scan done and reviewed.   Denies CP, SOB, N/V, numbness/tingling.    Objective:  Vital Signs Last 24 Hrs  T(C): 36.4 (09 Jan 2022 05:00), Max: 37 (08 Jan 2022 17:37)  T(F): 97.5 (09 Jan 2022 05:00), Max: 98.6 (08 Jan 2022 17:37)  HR: 87 (09 Jan 2022 05:00) (69 - 90)  BP: 137/72 (09 Jan 2022 05:00) (114/63 - 137/72)  BP(mean): 94 (09 Jan 2022 05:00) (94 - 94)  RR: 18 (09 Jan 2022 05:00) (16 - 18)  SpO2: 96% (09 Jan 2022 05:00) (96% - 100%)    AVSS  General: Pt Alert and oriented, NAD  RLE:  Dressing C/D/I, KI in place, VAC in place  Motor: EHL/FHL/TA/GS firing  Sensation: SILT throughout all nerve distributions  Pulses: Toes WWP    A/P: 66yMale s/p repeat explant, spacer exchange and revision gastroc flap on 01-06  - Plan for OR with Vascular tomorrow for R AKA   - Gallium scan performed, pending JOHN PAUL, appreciate ID recs   - Pain Control  - DVT ppx: Lovenox 30mg bid  - Post op abx: vancomycin 1250mg q12h  - Resume home meds  - PT, WBS: TTWB RLE   - NPO at midnight     Ortho Pager [phone]

## 2022-01-09 NOTE — PROGRESS NOTE ADULT - SUBJECTIVE AND OBJECTIVE BOX
Interval Events: Reviewed  Patient seen and examined at bedside.    Patient is a 66y old  Male who presents with a chief complaint of R Knee Pain (08 Jan 2022 22:54)  no acute event overnight, RA 96%, afebrile       PAST MEDICAL & SURGICAL HISTORY:  Status post percutaneous transluminal coronary angioplasty  2 stents    Atherosclerosis of coronary artery  CAD (coronary artery disease)    Osteoarthritis    HLD (hyperlipidemia)    Blood clot due to device, implant, or graft  was on blood thinners    Diabetes  on insulin pump    HTN (hypertension)    CHF (congestive heart failure)  EF ~ 25%    History of celiac disease    Diverticulitis    STEMI (ST elevation myocardial infarction)    Diabetic neuropathy    Anxiety and depression    Preoperative clearance    Other postprocedural status  Fixation hardware in foot LEFT    Stented coronary artery  10/18 heart attack  INFERIOR WALL MI    S/P CABG x 1  2018    S/P TKR (total knee replacement), right  with infection Mrsa   per pt he was cleared from MRSA infection    Surgery, elective  right knee wound debridement    History of open reduction and internal fixation (ORIF) procedure    H/O shoulder surgery  right    AICD (automatic cardioverter/defibrillator) present    Cholecystostomy care  drain inserted 2020 &amp; removed 4 months ago    History of tonsillectomy    History of hip replacement, total, right        MEDICATIONS:  Pulmonary:    Antimicrobials:  vancomycin  IVPB 1000 milliGRAM(s) IV Intermittent every 12 hours    Anticoagulants:  enoxaparin Injectable 30 milliGRAM(s) SubCutaneous every 12 hours    Cardiac:  furosemide    Tablet 40 milliGRAM(s) Oral daily      Allergies    ACE inhibitors (Hives)  carvedilol (Other)  enalapril (Hives)  Entresto (Other)    Intolerances        Vital Signs Last 24 Hrs  T(C): 36.4 (09 Jan 2022 05:00), Max: 37 (08 Jan 2022 17:37)  T(F): 97.5 (09 Jan 2022 05:00), Max: 98.6 (08 Jan 2022 17:37)  HR: 87 (09 Jan 2022 05:00) (69 - 90)  BP: 137/72 (09 Jan 2022 05:00) (114/63 - 137/72)  BP(mean): 94 (09 Jan 2022 05:00) (94 - 94)  RR: 18 (09 Jan 2022 05:00) (16 - 18)  SpO2: 96% (09 Jan 2022 05:00) (96% - 100%)    01-08 @ 07:01  -  01-09 @ 07:00  --------------------------------------------------------  IN: 720 mL / OUT: 2675 mL / NET: -1955 mL          Review of Systems:   •	General: negative  •	Skin/Breast: negative  •	Ophthalmologic: negative  •	ENMT: negative  •	Respiratory and Thorax: negative  •	Cardiovascular: negative  •	Gastrointestinal: negative  •	Genitourinary: negative  •	Musculoskeletal: negative  •	Neurological: negative  •	Psychiatric: negative  •	Hematology/Lymphatics: negative  •	Endocrine: negative  •	Allergic/Immunologic: negative    Physical Exam:   • Constitutional:	Well-developed, well nourished  • Eyes:	EOMI; PERRL; no drainage or redness  • ENMT:	No oral lesions; no gross abnormalities  • Neck	no thyromegaly or nodules  • Breasts:	not examined  • Back:	No deformity or limitation of movement  • Respiratory:	Breath Sounds equal & clear to percussion & auscultation, no accessory muscle use  • Cardiovascular:	Regular rate & rhythm, normal S1, S2; no murmurs, gallops or rubs; no S3, S4  • Gastrointestinal:	Soft, non-tender, no hepatosplenomegaly, normal bowel sounds  • Genitourinary:	not examined  • Rectal: not examined  • Extremities:	No cyanosis, clubbing or edema, right leg in knee immobilizer  • Vascular:	Equal and normal pulses (dorsalis pedis)  • Neurologica:l	not examined  • Skin:	No lesions; no rash  • Lymph Nodes:	No lymphadedenopathy  • Musculoskeletal:	No joint pain, swelling or deformity; no limitation of movement        LABS:      CBC Full  -  ( 08 Jan 2022 08:25 )  WBC Count : 12.16 K/uL  RBC Count : 4.21 M/uL  Hemoglobin : 10.5 g/dL  Hematocrit : 33.3 %  Platelet Count - Automated : 248 K/uL  Mean Cell Volume : 79.1 fl  Mean Cell Hemoglobin : 24.9 pg  Mean Cell Hemoglobin Concentration : 31.5 gm/dL  Auto Neutrophil # : x  Auto Lymphocyte # : x  Auto Monocyte # : x  Auto Eosinophil # : x  Auto Basophil # : x  Auto Neutrophil % : x  Auto Lymphocyte % : x  Auto Monocyte % : x  Auto Eosinophil % : x  Auto Basophil % : x    01-08    137  |  100  |  17  ----------------------------<  119<H>  3.8   |  28  |  0.87    Ca    8.8      08 Jan 2022 08:25                      Culture Results:   No growth at 1 day. (01-07 @ 10:05)      RADIOLOGY & ADDITIONAL STUDIES (The following images were personally reviewed):  Loja:                                     No  Urine output:                       adequate  DVT prophylaxis:                 Yes  Flattus:                                  Yes  Bowel movement:              No

## 2022-01-09 NOTE — PROGRESS NOTE ADULT - ASSESSMENT
66M hx of CAD, CHF (EF 20-25%), DM, R TKA several years ago c/b recurrent PJI and revisions, vascular surgery consulted for RLE amputation.    - added on to OR Class IV for R AKA 1/10  - consent obtained  - NPO past midnight 1/9,   - pre-op AM labs 1/10, to include coags, type & screen  - Repeat COVID swab 1/9  - Hold Lovenox for OR    Plan discussed with the chief resident and the Attending.

## 2022-01-09 NOTE — PROGRESS NOTE ADULT - ASSESSMENT
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   now s/p antibiotic cement spacer exchange, I&D and revision gastroc flap by PRS on 1/6.  Patient already had multiple recurrence and he had flank pus in his R knee.  Currently evaluated for R AKA.      Recommend:  1.  Continue vancomycin.  Agree with 1000mg IV q12hr.  Check trough 30 minutes before 4th dose  2.  Obtain JOHN PAUL to r/o endocarditis  3.  Obtain gallium scan to r/o ICD infection  4.  Follow up OR culture   5.  Follow up blood cultures    ID team 2 will follow.  Dr. Ruffin returns on 1/10/22

## 2022-01-09 NOTE — PROGRESS NOTE ADULT - SUBJECTIVE AND OBJECTIVE BOX
INTERVAL HPI/OVERNIGHT EVENTS:    Coverage for Dr. Ruffin     Patient was seen and examined at bedside.  Had 4 BM this morning ; reports that they were loose, not watery     CONSTITUTIONAL:  Negative fever or chills, feels well, good appetite  EYES:  Negative  blurry vision or double vision  CARDIOVASCULAR:  Negative for chest pain or palpitations  RESPIRATORY:  Negative for cough, wheezing, or SOB   GASTROINTESTINAL:  Negative for nausea, vomiting, diarrhea, constipation, or abdominal pain  GENITOURINARY:  Negative frequency, urgency or dysuria  NEUROLOGIC:  No headache, confusion, dizziness, lightheadedness      ANTIBIOTICS/RELEVANT:    MEDICATIONS  (STANDING):  acetaminophen     Tablet .. 1000 milliGRAM(s) Oral every 8 hours  atorvastatin 80 milliGRAM(s) Oral at bedtime  chlorhexidine 2% Cloths 1 Application(s) Topical every 12 hours  dextrose 40% Gel 15 Gram(s) Oral once  dextrose 5%. 1000 milliLiter(s) (50 mL/Hr) IV Continuous <Continuous>  dextrose 5%. 1000 milliLiter(s) (100 mL/Hr) IV Continuous <Continuous>  dextrose 50% Injectable 25 Gram(s) IV Push once  dextrose 50% Injectable 12.5 Gram(s) IV Push once  dextrose 50% Injectable 25 Gram(s) IV Push once  DULoxetine 20 milliGRAM(s) Oral daily  enoxaparin Injectable 30 milliGRAM(s) SubCutaneous every 12 hours  furosemide    Tablet 40 milliGRAM(s) Oral daily  glucagon  Injectable 1 milliGRAM(s) IntraMuscular once  insulin glargine Injectable (LANTUS) 20 Unit(s) SubCutaneous at bedtime  insulin lispro (ADMELOG) corrective regimen sliding scale   SubCutaneous every 6 hours  insulin lispro Injectable (ADMELOG) 3 Unit(s) SubCutaneous three times a day before meals  lactated ringers. 1000 milliLiter(s) (80 mL/Hr) IV Continuous <Continuous>  levothyroxine 25 MICROGram(s) Oral daily  pantoprazole    Tablet 40 milliGRAM(s) Oral before breakfast  potassium chloride    Tablet ER 20 milliEquivalent(s) Oral every 2 hours  povidone iodine 5% Nasal Swab 1 Application(s) Both Nostrils once  spironolactone 25 milliGRAM(s) Oral daily  vancomycin  IVPB 1000 milliGRAM(s) IV Intermittent every 12 hours    MEDICATIONS  (PRN):  ALPRAZolam 0.5 milliGRAM(s) Oral every 8 hours PRN anxiety  aluminum hydroxide/magnesium hydroxide/simethicone Suspension 30 milliLiter(s) Oral every 4 hours PRN Dyspepsia  cyclobenzaprine 5 milliGRAM(s) Oral three times a day PRN Muscle Spasm  HYDROmorphone  Injectable 0.5 milliGRAM(s) IV Push every 15 minutes PRN pacu  HYDROmorphone  Injectable 0.5 milliGRAM(s) IV Push every 3 hours PRN breakthru  oxyCODONE    IR 5 milliGRAM(s) Oral every 4 hours PRN Moderate Pain (4 - 6)  oxyCODONE    IR 10 milliGRAM(s) Oral every 4 hours PRN Severe Pain (7 - 10)        Vital Signs Last 24 Hrs  T(C): 36.9 (09 Jan 2022 14:15), Max: 37 (08 Jan 2022 17:37)  T(F): 98.5 (09 Jan 2022 14:15), Max: 98.6 (08 Jan 2022 17:37)  HR: 89 (09 Jan 2022 14:15) (80 - 90)  BP: 120/69 (09 Jan 2022 14:15) (114/63 - 137/72)  BP(mean): 94 (09 Jan 2022 05:00) (94 - 94)  RR: 18 (09 Jan 2022 14:15) (16 - 18)  SpO2: 99% (09 Jan 2022 14:15) (96% - 99%)    PHYSICAL EXAM:  Constitutional:  non-toxic, no distress  Eyes:DAMARIS, EOMI  Ear/Nose/Throat: no oral lesion   Neck:  supple  Respiratory: CTA colton  Cardiovascular: S1S2 RRR, no murmurs  Gastrointestinal:soft, (+) BS, no HSM  Extremities:no e/e/c  Vascular: DP Pulse:	right normal; left normal      LABS:                        10.4   12.03 )-----------( 285      ( 09 Jan 2022 11:14 )             33.8     01-09    136  |  96  |  17  ----------------------------<  132<H>  3.2<L>   |  29  |  0.92    Ca    8.8      09 Jan 2022 11:14    Vancomycin Level, Trough (01.08.22 @ 23:10)    Vancomycin Level, Trough: 22.0: Vancomycin trough levels should be rapidly reached and maintained at  15-20 ug/ml for life threatening MRSA  infections such as sepsis, endocarditis, osteomyelitis and pneumonia. A  first trough level should be drawn  before the 3rd or 4th dose.  Risk of renal toxicity is increased for levels >15 ug/ml, in patients on  other nephrotoxic drugs, who are  hemodynamically unstable, have unstable renal function, or are on  Vancomycin therapy for >14 days. Renal function with  creatinine levels should be monitored for those patients. ug/mL            MICROBIOLOGY:    Culture - Blood (01.07.22 @ 10:05)    Specimen Source: .Blood Blood-Venous    Culture Results:   No growth at 2 days.        RADIOLOGY & ADDITIONAL STUDIES:    < from: CT Lower Extremity No Cont, Right (01.06.22 @ 11:18) >  IMPRESSION:    Status post resection arthroplasty of the knee with fracture of the   cement surrounding the distal aspect of the femoral daniela. There is also   lucency surrounding the cement spacer, for which some degree of   mechanical loosening cannot be entirely excluded. Mild foci of air within   the joint space could reflect sequelae of recent instrumentation although   correlation with physical examination may be of utility to entirely   exclude superimposed infection, if there is clinical suspicion.    Status post gamma nail fixation of a remote proximal femoral fracture   without hardware complication..    < end of copied text >

## 2022-01-10 ENCOUNTER — RESULT REVIEW (OUTPATIENT)
Age: 67
End: 2022-01-10

## 2022-01-10 LAB
ANION GAP SERPL CALC-SCNC: 10 MMOL/L — SIGNIFICANT CHANGE UP (ref 5–17)
ANION GAP SERPL CALC-SCNC: 10 MMOL/L — SIGNIFICANT CHANGE UP (ref 5–17)
ANION GAP SERPL CALC-SCNC: 9 MMOL/L — SIGNIFICANT CHANGE UP (ref 5–17)
APTT BLD: 25.7 SEC — LOW (ref 27.5–35.5)
APTT BLD: 28.6 SEC — SIGNIFICANT CHANGE UP (ref 27.5–35.5)
BLD GP AB SCN SERPL QL: NEGATIVE — SIGNIFICANT CHANGE UP
BUN SERPL-MCNC: 15 MG/DL — SIGNIFICANT CHANGE UP (ref 7–23)
BUN SERPL-MCNC: 16 MG/DL — SIGNIFICANT CHANGE UP (ref 7–23)
BUN SERPL-MCNC: 17 MG/DL — SIGNIFICANT CHANGE UP (ref 7–23)
CALCIUM SERPL-MCNC: 8 MG/DL — LOW (ref 8.4–10.5)
CALCIUM SERPL-MCNC: 9 MG/DL — SIGNIFICANT CHANGE UP (ref 8.4–10.5)
CALCIUM SERPL-MCNC: 9.1 MG/DL — SIGNIFICANT CHANGE UP (ref 8.4–10.5)
CHLORIDE SERPL-SCNC: 94 MMOL/L — LOW (ref 96–108)
CHLORIDE SERPL-SCNC: 95 MMOL/L — LOW (ref 96–108)
CHLORIDE SERPL-SCNC: 98 MMOL/L — SIGNIFICANT CHANGE UP (ref 96–108)
CO2 SERPL-SCNC: 28 MMOL/L — SIGNIFICANT CHANGE UP (ref 22–31)
CO2 SERPL-SCNC: 29 MMOL/L — SIGNIFICANT CHANGE UP (ref 22–31)
CO2 SERPL-SCNC: 31 MMOL/L — SIGNIFICANT CHANGE UP (ref 22–31)
CREAT SERPL-MCNC: 0.91 MG/DL — SIGNIFICANT CHANGE UP (ref 0.5–1.3)
CREAT SERPL-MCNC: 0.94 MG/DL — SIGNIFICANT CHANGE UP (ref 0.5–1.3)
CREAT SERPL-MCNC: 1.15 MG/DL — SIGNIFICANT CHANGE UP (ref 0.5–1.3)
CULTURE RESULTS: SIGNIFICANT CHANGE UP
GLUCOSE BLDC GLUCOMTR-MCNC: 137 MG/DL — HIGH (ref 70–99)
GLUCOSE BLDC GLUCOMTR-MCNC: 166 MG/DL — HIGH (ref 70–99)
GLUCOSE BLDC GLUCOMTR-MCNC: 238 MG/DL — HIGH (ref 70–99)
GLUCOSE SERPL-MCNC: 161 MG/DL — HIGH (ref 70–99)
GLUCOSE SERPL-MCNC: 194 MG/DL — HIGH (ref 70–99)
GLUCOSE SERPL-MCNC: 266 MG/DL — HIGH (ref 70–99)
HCT VFR BLD CALC: 29.7 % — LOW (ref 39–50)
HCT VFR BLD CALC: 33.5 % — LOW (ref 39–50)
HCT VFR BLD CALC: 33.9 % — LOW (ref 39–50)
HGB BLD-MCNC: 10.6 G/DL — LOW (ref 13–17)
HGB BLD-MCNC: 10.6 G/DL — LOW (ref 13–17)
HGB BLD-MCNC: 9.2 G/DL — LOW (ref 13–17)
INR BLD: 1.27 — HIGH (ref 0.88–1.16)
INR BLD: 1.27 — HIGH (ref 0.88–1.16)
MAGNESIUM SERPL-MCNC: 1.4 MG/DL — LOW (ref 1.6–2.6)
MCHC RBC-ENTMCNC: 24.4 PG — LOW (ref 27–34)
MCHC RBC-ENTMCNC: 24.7 PG — LOW (ref 27–34)
MCHC RBC-ENTMCNC: 24.7 PG — LOW (ref 27–34)
MCHC RBC-ENTMCNC: 31 GM/DL — LOW (ref 32–36)
MCHC RBC-ENTMCNC: 31.3 GM/DL — LOW (ref 32–36)
MCHC RBC-ENTMCNC: 31.6 GM/DL — LOW (ref 32–36)
MCV RBC AUTO: 78.1 FL — LOW (ref 80–100)
MCV RBC AUTO: 78.8 FL — LOW (ref 80–100)
MCV RBC AUTO: 78.8 FL — LOW (ref 80–100)
NRBC # BLD: 0 /100 WBCS — SIGNIFICANT CHANGE UP (ref 0–0)
ORGANISM # SPEC MICROSCOPIC CNT: SIGNIFICANT CHANGE UP
ORGANISM # SPEC MICROSCOPIC CNT: SIGNIFICANT CHANGE UP
PHOSPHATE SERPL-MCNC: 4 MG/DL — SIGNIFICANT CHANGE UP (ref 2.5–4.5)
PLATELET # BLD AUTO: 274 K/UL — SIGNIFICANT CHANGE UP (ref 150–400)
PLATELET # BLD AUTO: 300 K/UL — SIGNIFICANT CHANGE UP (ref 150–400)
PLATELET # BLD AUTO: 304 K/UL — SIGNIFICANT CHANGE UP (ref 150–400)
POTASSIUM SERPL-MCNC: 3.6 MMOL/L — SIGNIFICANT CHANGE UP (ref 3.5–5.3)
POTASSIUM SERPL-MCNC: 3.9 MMOL/L — SIGNIFICANT CHANGE UP (ref 3.5–5.3)
POTASSIUM SERPL-MCNC: 4.6 MMOL/L — SIGNIFICANT CHANGE UP (ref 3.5–5.3)
POTASSIUM SERPL-SCNC: 3.6 MMOL/L — SIGNIFICANT CHANGE UP (ref 3.5–5.3)
POTASSIUM SERPL-SCNC: 3.9 MMOL/L — SIGNIFICANT CHANGE UP (ref 3.5–5.3)
POTASSIUM SERPL-SCNC: 4.6 MMOL/L — SIGNIFICANT CHANGE UP (ref 3.5–5.3)
PROTHROM AB SERPL-ACNC: 15.1 SEC — HIGH (ref 10.6–13.6)
PROTHROM AB SERPL-ACNC: 15.1 SEC — HIGH (ref 10.6–13.6)
RBC # BLD: 3.77 M/UL — LOW (ref 4.2–5.8)
RBC # BLD: 4.29 M/UL — SIGNIFICANT CHANGE UP (ref 4.2–5.8)
RBC # BLD: 4.3 M/UL — SIGNIFICANT CHANGE UP (ref 4.2–5.8)
RBC # FLD: 17.5 % — HIGH (ref 10.3–14.5)
RBC # FLD: 17.7 % — HIGH (ref 10.3–14.5)
RBC # FLD: 17.9 % — HIGH (ref 10.3–14.5)
RH IG SCN BLD-IMP: POSITIVE — SIGNIFICANT CHANGE UP
SARS-COV-2 RNA SPEC QL NAA+PROBE: SIGNIFICANT CHANGE UP
SODIUM SERPL-SCNC: 132 MMOL/L — LOW (ref 135–145)
SODIUM SERPL-SCNC: 136 MMOL/L — SIGNIFICANT CHANGE UP (ref 135–145)
SODIUM SERPL-SCNC: 136 MMOL/L — SIGNIFICANT CHANGE UP (ref 135–145)
SPECIMEN SOURCE: SIGNIFICANT CHANGE UP
WBC # BLD: 13.53 K/UL — HIGH (ref 3.8–10.5)
WBC # BLD: 13.65 K/UL — HIGH (ref 3.8–10.5)
WBC # BLD: 14.91 K/UL — HIGH (ref 3.8–10.5)
WBC # FLD AUTO: 13.53 K/UL — HIGH (ref 3.8–10.5)
WBC # FLD AUTO: 13.65 K/UL — HIGH (ref 3.8–10.5)
WBC # FLD AUTO: 14.91 K/UL — HIGH (ref 3.8–10.5)

## 2022-01-10 PROCEDURE — 88311 DECALCIFY TISSUE: CPT | Mod: 26

## 2022-01-10 PROCEDURE — 27592 AMPUTATE LEG AT THIGH: CPT | Mod: GC

## 2022-01-10 PROCEDURE — 93010 ELECTROCARDIOGRAM REPORT: CPT

## 2022-01-10 PROCEDURE — 99231 SBSQ HOSP IP/OBS SF/LOW 25: CPT | Mod: GC

## 2022-01-10 PROCEDURE — 88307 TISSUE EXAM BY PATHOLOGIST: CPT | Mod: 26

## 2022-01-10 PROCEDURE — 99232 SBSQ HOSP IP/OBS MODERATE 35: CPT

## 2022-01-10 DEVICE — IMPLANTABLE DEVICE: Type: IMPLANTABLE DEVICE | Status: FUNCTIONAL

## 2022-01-10 RX ORDER — MAGNESIUM SULFATE 500 MG/ML
2 VIAL (ML) INJECTION ONCE
Refills: 0 | Status: COMPLETED | OUTPATIENT
Start: 2022-01-10 | End: 2022-01-10

## 2022-01-10 RX ORDER — INSULIN GLARGINE 100 [IU]/ML
17 INJECTION, SOLUTION SUBCUTANEOUS AT BEDTIME
Refills: 0 | Status: DISCONTINUED | OUTPATIENT
Start: 2022-01-10 | End: 2022-01-11

## 2022-01-10 RX ORDER — HYDROMORPHONE HYDROCHLORIDE 2 MG/ML
0.5 INJECTION INTRAMUSCULAR; INTRAVENOUS; SUBCUTANEOUS EVERY 6 HOURS
Refills: 0 | Status: DISCONTINUED | OUTPATIENT
Start: 2022-01-10 | End: 2022-01-11

## 2022-01-10 RX ORDER — INSULIN LISPRO 100/ML
5 VIAL (ML) SUBCUTANEOUS
Refills: 0 | Status: DISCONTINUED | OUTPATIENT
Start: 2022-01-10 | End: 2022-01-11

## 2022-01-10 RX ORDER — HEPARIN SODIUM 5000 [USP'U]/ML
5000 INJECTION INTRAVENOUS; SUBCUTANEOUS EVERY 8 HOURS
Refills: 0 | Status: DISCONTINUED | OUTPATIENT
Start: 2022-01-10 | End: 2022-01-27

## 2022-01-10 RX ORDER — HYDROMORPHONE HYDROCHLORIDE 2 MG/ML
0.5 INJECTION INTRAMUSCULAR; INTRAVENOUS; SUBCUTANEOUS ONCE
Refills: 0 | Status: DISCONTINUED | OUTPATIENT
Start: 2022-01-10 | End: 2022-01-10

## 2022-01-10 RX ORDER — ACETAMINOPHEN 500 MG
1000 TABLET ORAL ONCE
Refills: 0 | Status: COMPLETED | OUTPATIENT
Start: 2022-01-10 | End: 2022-01-10

## 2022-01-10 RX ORDER — ACETAMINOPHEN 500 MG
1000 TABLET ORAL ONCE
Refills: 0 | Status: DISCONTINUED | OUTPATIENT
Start: 2022-01-10 | End: 2022-01-10

## 2022-01-10 RX ADMIN — OXYCODONE HYDROCHLORIDE 10 MILLIGRAM(S): 5 TABLET ORAL at 00:35

## 2022-01-10 RX ADMIN — HYDROMORPHONE HYDROCHLORIDE 0.5 MILLIGRAM(S): 2 INJECTION INTRAMUSCULAR; INTRAVENOUS; SUBCUTANEOUS at 11:18

## 2022-01-10 RX ADMIN — Medication 400 MILLIGRAM(S): at 22:34

## 2022-01-10 RX ADMIN — Medication 2: at 06:31

## 2022-01-10 RX ADMIN — PANTOPRAZOLE SODIUM 40 MILLIGRAM(S): 20 TABLET, DELAYED RELEASE ORAL at 06:30

## 2022-01-10 RX ADMIN — Medication 3 UNIT(S): at 17:07

## 2022-01-10 RX ADMIN — Medication 4: at 00:24

## 2022-01-10 RX ADMIN — Medication 1000 MILLIGRAM(S): at 07:30

## 2022-01-10 RX ADMIN — Medication 250 MILLIGRAM(S): at 09:30

## 2022-01-10 RX ADMIN — CHLORHEXIDINE GLUCONATE 1 APPLICATION(S): 213 SOLUTION TOPICAL at 06:36

## 2022-01-10 RX ADMIN — OXYCODONE HYDROCHLORIDE 10 MILLIGRAM(S): 5 TABLET ORAL at 14:13

## 2022-01-10 RX ADMIN — INSULIN GLARGINE 17 UNIT(S): 100 INJECTION, SOLUTION SUBCUTANEOUS at 22:32

## 2022-01-10 RX ADMIN — HEPARIN SODIUM 5000 UNIT(S): 5000 INJECTION INTRAVENOUS; SUBCUTANEOUS at 14:35

## 2022-01-10 RX ADMIN — Medication 25 MICROGRAM(S): at 06:30

## 2022-01-10 RX ADMIN — HEPARIN SODIUM 5000 UNIT(S): 5000 INJECTION INTRAVENOUS; SUBCUTANEOUS at 22:35

## 2022-01-10 RX ADMIN — Medication 250 MILLIGRAM(S): at 00:24

## 2022-01-10 RX ADMIN — SPIRONOLACTONE 25 MILLIGRAM(S): 25 TABLET, FILM COATED ORAL at 06:30

## 2022-01-10 RX ADMIN — Medication 5 UNIT(S): at 22:30

## 2022-01-10 RX ADMIN — HYDROMORPHONE HYDROCHLORIDE 0.5 MILLIGRAM(S): 2 INJECTION INTRAMUSCULAR; INTRAVENOUS; SUBCUTANEOUS at 11:40

## 2022-01-10 RX ADMIN — ATORVASTATIN CALCIUM 80 MILLIGRAM(S): 80 TABLET, FILM COATED ORAL at 22:35

## 2022-01-10 RX ADMIN — DULOXETINE HYDROCHLORIDE 20 MILLIGRAM(S): 30 CAPSULE, DELAYED RELEASE ORAL at 17:08

## 2022-01-10 RX ADMIN — OXYCODONE HYDROCHLORIDE 10 MILLIGRAM(S): 5 TABLET ORAL at 13:13

## 2022-01-10 RX ADMIN — Medication 25 GRAM(S): at 23:59

## 2022-01-10 RX ADMIN — OXYCODONE HYDROCHLORIDE 10 MILLIGRAM(S): 5 TABLET ORAL at 07:30

## 2022-01-10 RX ADMIN — Medication 4: at 17:07

## 2022-01-10 RX ADMIN — HYDROMORPHONE HYDROCHLORIDE 0.5 MILLIGRAM(S): 2 INJECTION INTRAMUSCULAR; INTRAVENOUS; SUBCUTANEOUS at 17:22

## 2022-01-10 RX ADMIN — Medication 1000 MILLIGRAM(S): at 06:30

## 2022-01-10 RX ADMIN — HYDROMORPHONE HYDROCHLORIDE 0.5 MILLIGRAM(S): 2 INJECTION INTRAMUSCULAR; INTRAVENOUS; SUBCUTANEOUS at 17:08

## 2022-01-10 RX ADMIN — Medication 40 MILLIGRAM(S): at 06:31

## 2022-01-10 RX ADMIN — OXYCODONE HYDROCHLORIDE 10 MILLIGRAM(S): 5 TABLET ORAL at 06:30

## 2022-01-10 RX ADMIN — HYDROMORPHONE HYDROCHLORIDE 0.5 MILLIGRAM(S): 2 INJECTION INTRAMUSCULAR; INTRAVENOUS; SUBCUTANEOUS at 10:56

## 2022-01-10 NOTE — PROGRESS NOTE ADULT - SUBJECTIVE AND OBJECTIVE BOX
INTERVAL HPI/OVERNIGHT EVENTS:    Patient is a 66y old  Male who presents with a chief complaint of R Knee Pain (10 Kris 2022 08:32)  s/p R AKA today.     Pt reports the following symptoms:    CONSTITUTIONAL:  Negative fever or chills, feels well, good appetite  EYES:  Negative  blurry vision or double vision  CARDIOVASCULAR:  Negative for chest pain or palpitations  RESPIRATORY:  Negative for cough, wheezing, or SOB   GASTROINTESTINAL:  Negative for nausea, vomiting, diarrhea, constipation, or abdominal pain  GENITOURINARY:  Negative frequency, urgency or dysuria  NEUROLOGIC:  No headache, confusion, dizziness, lightheadedness    MEDICATIONS  (STANDING):  acetaminophen     Tablet .. 1000 milliGRAM(s) Oral every 8 hours  atorvastatin 80 milliGRAM(s) Oral at bedtime  dextrose 40% Gel 15 Gram(s) Oral once  dextrose 5%. 1000 milliLiter(s) (50 mL/Hr) IV Continuous <Continuous>  dextrose 5%. 1000 milliLiter(s) (100 mL/Hr) IV Continuous <Continuous>  dextrose 50% Injectable 25 Gram(s) IV Push once  dextrose 50% Injectable 12.5 Gram(s) IV Push once  dextrose 50% Injectable 25 Gram(s) IV Push once  DULoxetine 20 milliGRAM(s) Oral daily  furosemide    Tablet 40 milliGRAM(s) Oral daily  glucagon  Injectable 1 milliGRAM(s) IntraMuscular once  insulin glargine Injectable (LANTUS) 20 Unit(s) SubCutaneous at bedtime  insulin lispro (ADMELOG) corrective regimen sliding scale   SubCutaneous every 6 hours  insulin lispro Injectable (ADMELOG) 3 Unit(s) SubCutaneous three times a day before meals  lactated ringers. 1000 milliLiter(s) (60 mL/Hr) IV Continuous <Continuous>  levothyroxine 25 MICROGram(s) Oral daily  pantoprazole    Tablet 40 milliGRAM(s) Oral before breakfast  povidone iodine 5% Nasal Swab 1 Application(s) Both Nostrils once  spironolactone 25 milliGRAM(s) Oral daily  vancomycin  IVPB 1000 milliGRAM(s) IV Intermittent every 12 hours    MEDICATIONS  (PRN):  ALPRAZolam 0.5 milliGRAM(s) Oral every 8 hours PRN anxiety  aluminum hydroxide/magnesium hydroxide/simethicone Suspension 30 milliLiter(s) Oral every 4 hours PRN Dyspepsia  cyclobenzaprine 5 milliGRAM(s) Oral three times a day PRN Muscle Spasm  HYDROmorphone  Injectable 0.5 milliGRAM(s) IV Push every 3 hours PRN breakthru  HYDROmorphone  Injectable 0.5 milliGRAM(s) IV Push every 15 minutes PRN pacu  oxyCODONE    IR 5 milliGRAM(s) Oral every 4 hours PRN Moderate Pain (4 - 6)  oxyCODONE    IR 10 milliGRAM(s) Oral every 4 hours PRN Severe Pain (7 - 10)      PHYSICAL EXAM  Vital Signs Last 24 Hrs  T(C): 36.1 (10 Kris 2022 10:39), Max: 37.7 (10 Kris 2022 05:00)  T(F): 97 (10 Kris 2022 10:39), Max: 99.8 (10 Kris 2022 05:00)  HR: 74 (10 Kris 2022 11:30) (63 - 89)  BP: 129/68 (10 Kris 2022 11:30) (103/63 - 159/76)  BP(mean): 92 (10 Kris 2022 11:30) (71 - 109)  RR: 12 (10 Kris 2022 11:30) (11 - 20)  SpO2: 100% (10 Kris 2022 11:30) (92% - 100%)    Constitutional: NAD.   HEENT: NCAT, MMM, OP clear, EOMI, , no proptosis or lid retraction  Neck: no thyromegaly or palpable thyroid nodules   Respiratory: lungs CTAB.  Cardiovascular: regular rhythm, normal S1 and S2, no audible murmurs  GI: soft, NT/ND, no masses/HSM appreciated.  Neurology: no tremors  Skin: no visible rashes/lesions  Psychiatric: AAO x 3, normal affect/mood.    LABS:                        10.6   13.53 )-----------( 300      ( 10 Kris 2022 06:17 )             33.9     01-10    136  |  98  |  15  ----------------------------<  161<H>  3.9   |  29  |  0.94    Ca    9.0      10 Kris 2022 06:18      PT/INR - ( 10 Kris 2022 00:07 )   PT: 15.1 sec;   INR: 1.27          PTT - ( 10 Kris 2022 00:07 )  PTT:28.6 sec    Thyroid Stimulating Hormone, Serum: 2.850 uIU/mL (01-08 @ 08:25)  Thyroid Stimulating Hormone, Serum: 1.68 uIU/mL (03-12 @ 17:00)      HbA1C:         Insulin Sliding Scale requirements X 24 Hours:      RADIOLOGY & ADDITIONAL TESTS:      Assessment and Recommendation:   · Assessment	  66M hx of CAD with CABG , CHF (EF 20-25%) with AICD, T2DM c/b neuropathy with hx of diabetic ulcer, HLD. HTN, COPD, diverticulitis, celiac disease, anxiety and depression, cholecystostomy, R hip replacement 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 Castro (explant and abx spacer exchange), transferred here from [facility] for recurrent PJI for repeat spacer exchange and possible flap coverage with Dr. Vaughn/Dr. Bowen. Pt has been experiencing atraumatic worsening R knee pain x 1 month. Notes he went to a procedure center where his knee was aspirated 3 weeks ago. Pt was seen at [facility] yesterday where his knee was aspirated with 283k cell count. WBC 14, ESR 78, +, AVSS. Pt started on vancomycin + rifampin. 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 Now s/p Replacement, antibiotic spacer 06-Jan-2022. Also with hyperglycemia.      A1C: 9.0%  Weight: 97kg BMI 28  Cr/GFR: 0.9/104  EF: 20-25%     Problem/Recommendation - 1:  ·  Problem: Type 2 diabetes mellitus.   ·  Recommendation: Please continue lantus   20    units at night .  Please start lispro    units before each meal.  Please continue lispro moderate dose sliding scale four times daily with meals and at bedtime    Pt's fingerstick glucose goal is 100-180.    Will continue to monitor     For discharge, pt can continue    Pt can follow up at discharge with Dr Schmitt at scheduled appt. 2/8.  Will discuss case with Dr. Luevano    and update primary team.     Problem/Recommendation - 2:  ·  Problem: Hypothyroidism.   ·  Recommendation: TSH 4-5 past several years, not on therapy. Nov 2021 - TSH 8.08, free T4 0.9; started on levothyroxine 25mcg daily.  continue levothyroxine 25mcg daily.  TSH:2.85  Ft4:0.941   INTERVAL HPI/OVERNIGHT EVENTS:    Patient is a 66y old  Male who presents with a chief complaint of R Knee Pain (10 Kris 2022 08:32)  s/p R AKA today.   Patient reports feeling better internally. Reports some phantom itching but pain is manageable.   He reports good appetite and is ordering dinner.     Pt reports the following symptoms:    CONSTITUTIONAL:  Negative fever or chills, feels well, good appetite  EYES:  Negative  blurry vision or double vision  CARDIOVASCULAR:  Negative for chest pain or palpitations  RESPIRATORY:  Negative for cough, wheezing, or SOB   GASTROINTESTINAL:  Negative for nausea, vomiting, diarrhea, constipation, or abdominal pain  GENITOURINARY:  Negative frequency, urgency or dysuria  NEUROLOGIC:  No headache, confusion, dizziness, lightheadedness    MEDICATIONS  (STANDING):  acetaminophen     Tablet .. 1000 milliGRAM(s) Oral every 8 hours  atorvastatin 80 milliGRAM(s) Oral at bedtime  dextrose 40% Gel 15 Gram(s) Oral once  dextrose 5%. 1000 milliLiter(s) (50 mL/Hr) IV Continuous <Continuous>  dextrose 5%. 1000 milliLiter(s) (100 mL/Hr) IV Continuous <Continuous>  dextrose 50% Injectable 25 Gram(s) IV Push once  dextrose 50% Injectable 12.5 Gram(s) IV Push once  dextrose 50% Injectable 25 Gram(s) IV Push once  DULoxetine 20 milliGRAM(s) Oral daily  furosemide    Tablet 40 milliGRAM(s) Oral daily  glucagon  Injectable 1 milliGRAM(s) IntraMuscular once  insulin glargine Injectable (LANTUS) 20 Unit(s) SubCutaneous at bedtime  insulin lispro (ADMELOG) corrective regimen sliding scale   SubCutaneous every 6 hours  insulin lispro Injectable (ADMELOG) 3 Unit(s) SubCutaneous three times a day before meals  lactated ringers. 1000 milliLiter(s) (60 mL/Hr) IV Continuous <Continuous>  levothyroxine 25 MICROGram(s) Oral daily  pantoprazole    Tablet 40 milliGRAM(s) Oral before breakfast  povidone iodine 5% Nasal Swab 1 Application(s) Both Nostrils once  spironolactone 25 milliGRAM(s) Oral daily  vancomycin  IVPB 1000 milliGRAM(s) IV Intermittent every 12 hours    MEDICATIONS  (PRN):  ALPRAZolam 0.5 milliGRAM(s) Oral every 8 hours PRN anxiety  aluminum hydroxide/magnesium hydroxide/simethicone Suspension 30 milliLiter(s) Oral every 4 hours PRN Dyspepsia  cyclobenzaprine 5 milliGRAM(s) Oral three times a day PRN Muscle Spasm  HYDROmorphone  Injectable 0.5 milliGRAM(s) IV Push every 3 hours PRN breakthru  HYDROmorphone  Injectable 0.5 milliGRAM(s) IV Push every 15 minutes PRN pacu  oxyCODONE    IR 5 milliGRAM(s) Oral every 4 hours PRN Moderate Pain (4 - 6)  oxyCODONE    IR 10 milliGRAM(s) Oral every 4 hours PRN Severe Pain (7 - 10)      PHYSICAL EXAM  Vital Signs Last 24 Hrs  T(C): 36.1 (10 Kris 2022 10:39), Max: 37.7 (10 Kris 2022 05:00)  T(F): 97 (10 Kris 2022 10:39), Max: 99.8 (10 Kris 2022 05:00)  HR: 74 (10 Kris 2022 11:30) (63 - 89)  BP: 129/68 (10 Kris 2022 11:30) (103/63 - 159/76)  BP(mean): 92 (10 Kris 2022 11:30) (71 - 109)  RR: 12 (10 Kris 2022 11:30) (11 - 20)  SpO2: 100% (10 Kris 2022 11:30) (92% - 100%)    Constitutional: NAD.   HEENT: NCAT, MMM, OP clear, EOMI, , no proptosis or lid retraction  Neck: no thyromegaly or palpable thyroid nodules   Respiratory: lungs CTAB.  Cardiovascular: regular rhythm, normal S1 and S2, no audible murmurs  GI: soft, NT/ND, no masses/HSM appreciated.  Neurology: no tremors  Skin: no visible rashes/lesions, R AKA   Psychiatric: AAO x 3, normal affect/mood.    LABS:                        10.6   13.53 )-----------( 300      ( 10 Kris 2022 06:17 )             33.9     01-10    136  |  98  |  15  ----------------------------<  161<H>  3.9   |  29  |  0.94    Ca    9.0      10 Kris 2022 06:18      PT/INR - ( 10 Kris 2022 00:07 )   PT: 15.1 sec;   INR: 1.27          PTT - ( 10 Kris 2022 00:07 )  PTT:28.6 sec    Thyroid Stimulating Hormone, Serum: 2.850 uIU/mL (01-08 @ 08:25)  Thyroid Stimulating Hormone, Serum: 1.68 uIU/mL (03-12 @ 17:00)      HbA1C:         Insulin Sliding Scale requirements X 24 Hours:      RADIOLOGY & ADDITIONAL TESTS:      Assessment and Recommendation:   · Assessment	  66M hx of CAD with CABG , CHF (EF 20-25%) with AICD, T2DM c/b neuropathy with hx of diabetic ulcer, HLD. HTN, COPD, diverticulitis, celiac disease, anxiety and depression, cholecystostomy, R hip replacement 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 Castro (explant and abx spacer exchange), transferred here from [facility] for recurrent PJI for repeat spacer exchange and possible flap coverage with Dr. Vaughn/Dr. Bowen. Pt has been experiencing atraumatic worsening R knee pain x 1 month. Notes he went to a procedure center where his knee was aspirated 3 weeks ago. Pt was seen at [facility] yesterday where his knee was aspirated with 283k cell count. WBC 14, ESR 78, +, AVSS. Pt started on vancomycin + rifampin. 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 Now s/p Replacement, antibiotic spacer 06-Jan-2022. Also with hyperglycemia.  now s/p R AKA on vascular service     A1C: 9.0%  Weight: 97kg BMI 28  Cr/GFR: 0.9/104  EF: 20-25%     Problem/Recommendation - 1:  ·  Problem: Type 2 diabetes mellitus.   ·  Recommendation: Please continue lantus  17 units at night .  Please start lispro  7  units before each meal.  Please continue lispro moderate dose sliding scale four times daily with meals and at bedtime    Pt's fingerstick glucose goal is 100-180.    Will continue to monitor     For discharge, pt can continue    Pt can follow up at discharge with Dr Schmitt at scheduled appt. 2/8.  Will discuss case with Dr. Luevano    and update primary team.     Problem/Recommendation - 2:  ·  Problem: Hypothyroidism.   ·  Recommendation: TSH 4-5 past several years, not on therapy. Nov 2021 - TSH 8.08, free T4 0.9; started on levothyroxine 25mcg daily.  continue levothyroxine 25mcg daily.  TSH:2.85  Ft4:0.941

## 2022-01-10 NOTE — PROGRESS NOTE ADULT - SUBJECTIVE AND OBJECTIVE BOX
Pre-op Diagnosis: septic knee   Procedure: CLOTILDE ARMSTRONG  Surgeon: Mellissa  Consent: Consented                        10.6   13.65 )-----------( 304      ( 10 Kris 2022 00:09 )             33.5     01-10    136  |  95<L>  |  16  ----------------------------<  266<H>  3.6   |  31  |  0.91    Ca    9.1      10 Kris 2022 00:09      PT/INR - ( 10 Kris 2022 00:07 )   PT: 15.1 sec;   INR: 1.27          PTT - ( 10 Kris 2022 00:07 )  PTT:28.6 sec      Type & Screen:     Apos Ab neg       CXR: No infiltrates  EKG:     COVID status/date: [x]Negative/Date: 1/9 [ ]Positive/Date:       I  A/P: 66yMale pre-op for above procedure  1. NPO past midnight, except medications  2. IVF at midnight:   3. [ 2] Blood on hold, Units:

## 2022-01-10 NOTE — PROGRESS NOTE ADULT - ASSESSMENT
66M hx of CAD, CHF (EF 20-25%), DM, R TKA several years ago c/b recurrent PJI and revisions, pending OR for R AKA later today     - proceed to OR   - rest of care per primary team   - 3C to continue following   - discussed w chief resident  66M hx of CAD, CHF (EF 20-25%), DM, R TKA several years ago c/b recurrent PJI and revisions, pending OR for R AKA later today     - proceed to OR   - Can transfer to vascular service under [facility] after OR  - discussed w attending surgeon + chief resident

## 2022-01-10 NOTE — BRIEF OPERATIVE NOTE - OPERATION/FINDINGS
Right above knee guillotine amputation. Ligation of femoral vessels with 3-O prolene suture. Hemostasis achieved with 2-O silk ties and number 1 vicryl suture.

## 2022-01-10 NOTE — PROGRESS NOTE ADULT - ASSESSMENT
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   Initially underwent antibiotic cement spacer exchange, I&D and revision gastroc flap by PRS on 1/6.  Patient already had multiple recurrence and he had flank pus in his R knee.  Now s/p AKA today.  Need JOHN PAUL and gallium scan to determine the duration of abx (4 vs 6 weeks).    - cont vancomycin 1250mg IV q12h, check trough before 4th dose, goal 15-20  - obtain JOHN PAUL to r/o endocarditis  - obtain gallium scan to r/o ICD infection  - f/u BCx (clears since 1/6)      Team 2 will follow you.    Case d/w primary team.    Marta Ruffin MD, MS  Infectious Disease attending  work cell [phone]   For any questions during evening/weekend/holiday, please page ID on call

## 2022-01-10 NOTE — PROGRESS NOTE ADULT - SUBJECTIVE AND OBJECTIVE BOX
Ortho Progress Note    Procedure: Right TKA explant, spacer exchange and revision gastroc flap   Surgeon: Dr. JOSEPHINE Vaughn    Subjective:  Pain controlled with medication. Plan for AKA today with Dr Malloy. Gallium scan done and reviewed.   Denies CP, SOB, N/V, numbness/tingling.    Objective:  Vital Signs Last 24 Hrs  T(C): 37.7 (10 Kris 2022 05:00), Max: 37.7 (10 Kris 2022 05:00)  T(F): 99.8 (10 Kris 2022 05:00), Max: 99.8 (10 Kris 2022 05:00)  HR: 88 (10 Kris 2022 05:00) (63 - 89)  BP: 106/54 (10 Kris 2022 05:00) (106/54 - 120/69)  BP(mean): 71 (10 Kris 2022 05:00) (71 - 71)  RR: 18 (10 Kris 2022 05:00) (18 - 18)  SpO2: 92% (10 Kris 2022 05:00) (92% - 100%)    AVSS  General: Pt Alert and oriented, NAD  RLE:  Dressing C/D/I, KI in place, VAC in place  Motor: EHL/FHL/TA/GS firing  Sensation: SILT throughout all nerve distributions  Pulses: Toes WWP    A/P: 66yMale s/p repeat explant, spacer exchange and revision gastroc flap on 01-06  - Plan for OR with Vascular today for R AKA   - Gallium scan performed, pending JOHN PAUL, appreciate ID recs   - Pain Control  - DVT ppx: Lovenox 30mg bid - holding  - Post op abx: vancomycin 1250mg q12h  - Resume home meds  - PT, WBS: TTWB RLE   - NPO for OR today    Ortho Pager [phone]

## 2022-01-10 NOTE — PRE-OP CHECKLIST - BOWEL PREP
How Severe Is Your Skin Lesion?: moderate
Have Your Skin Lesions Been Treated?: not been treated
Is This A New Presentation, Or A Follow-Up?: Skin Lesions
n/a
n/a

## 2022-01-10 NOTE — PROGRESS NOTE ADULT - SUBJECTIVE AND OBJECTIVE BOX
SUBJECTIVE:   Patient seen and examined bedside by resident.  HDS/Afebrile   Denies N/V/CP/SOB  Endorses RLE pain   Pending OR    furosemide    Tablet 40 milliGRAM(s) Oral daily  spironolactone 25 milliGRAM(s) Oral daily  vancomycin  IVPB 1000 milliGRAM(s) IV Intermittent every 12 hours    MEDICATIONS  (PRN):  ALPRAZolam 0.5 milliGRAM(s) Oral every 8 hours PRN anxiety  aluminum hydroxide/magnesium hydroxide/simethicone Suspension 30 milliLiter(s) Oral every 4 hours PRN Dyspepsia  cyclobenzaprine 5 milliGRAM(s) Oral three times a day PRN Muscle Spasm  HYDROmorphone  Injectable 0.5 milliGRAM(s) IV Push every 15 minutes PRN pacu  HYDROmorphone  Injectable 0.5 milliGRAM(s) IV Push every 3 hours PRN breakthru  oxyCODONE    IR 5 milliGRAM(s) Oral every 4 hours PRN Moderate Pain (4 - 6)  oxyCODONE    IR 10 milliGRAM(s) Oral every 4 hours PRN Severe Pain (7 - 10)      I&O's Detail    09 Jan 2022 07:01  -  10 Kris 2022 07:00  --------------------------------------------------------  IN:    Lactated Ringers: 420 mL    Oral Fluid: 760 mL  Total IN: 1180 mL    OUT:    Incontinent per Condom Catheter (mL): 1800 mL  Total OUT: 1800 mL    Total NET: -620 mL          Vital Signs Last 24 Hrs  T(C): 36.8 (10 Kris 2022 08:06), Max: 37.7 (10 Kris 2022 05:00)  T(F): 98.3 (10 Kris 2022 08:06), Max: 99.8 (10 Kris 2022 05:00)  HR: 73 (10 Kris 2022 08:06) (63 - 89)  BP: 103/63 (10 Kris 2022 08:06) (103/63 - 120/69)  BP(mean): 71 (10 Kris 2022 05:00) (71 - 71)  RR: 17 (10 Kris 2022 08:06) (17 - 18)  SpO2: 100% (10 Kris 2022 08:06) (92% - 100%)    General: NAD, resting comfortably in bed  C/V: NSR  Pulm: Nonlabored breathing, no respiratory distress  Abd: soft, NT/ND  Extrem: WWP, LLE w/o clubbing/cyanosis/edema. RLE w knee immobilizer, wrapped in ACE, w wound vac w adequate seal, draining brown fluid    LABS:                        10.6   13.53 )-----------( 300      ( 10 Kris 2022 06:17 )             33.9     01-10    136  |  98  |  15  ----------------------------<  161<H>  3.9   |  29  |  0.94    Ca    9.0      10 Kris 2022 06:18      PT/INR - ( 10 Kris 2022 00:07 )   PT: 15.1 sec;   INR: 1.27          PTT - ( 10 Kris 2022 00:07 )  PTT:28.6 sec      RADIOLOGY & ADDITIONAL STUDIES:

## 2022-01-10 NOTE — PROGRESS NOTE ADULT - SUBJECTIVE AND OBJECTIVE BOX
INFECTIOUS DISEASES CONSULT FOLLOW-UP NOTE    INTERVAL HPI/OVERNIGHT EVENTS:  Had AKA today  feels well, pain well controlled  no new complaints     ROS:   Constitutional, eyes, ENT, cardiovascular, respiratory, gastrointestinal, genitourinary, integumentary, neurological, psychiatric and heme/lymph are otherwise negative other than noted above       ANTIBIOTICS/RELEVANT:    MEDICATIONS  (STANDING):  atorvastatin 80 milliGRAM(s) Oral at bedtime  dextrose 40% Gel 15 Gram(s) Oral once  dextrose 5%. 1000 milliLiter(s) (50 mL/Hr) IV Continuous <Continuous>  dextrose 5%. 1000 milliLiter(s) (100 mL/Hr) IV Continuous <Continuous>  dextrose 50% Injectable 25 Gram(s) IV Push once  dextrose 50% Injectable 12.5 Gram(s) IV Push once  dextrose 50% Injectable 25 Gram(s) IV Push once  DULoxetine 20 milliGRAM(s) Oral daily  furosemide    Tablet 40 milliGRAM(s) Oral daily  glucagon  Injectable 1 milliGRAM(s) IntraMuscular once  heparin   Injectable 5000 Unit(s) SubCutaneous every 8 hours  insulin glargine Injectable (LANTUS) 20 Unit(s) SubCutaneous at bedtime  insulin lispro (ADMELOG) corrective regimen sliding scale   SubCutaneous every 6 hours  insulin lispro Injectable (ADMELOG) 3 Unit(s) SubCutaneous three times a day before meals  lactated ringers. 1000 milliLiter(s) (60 mL/Hr) IV Continuous <Continuous>  levothyroxine 25 MICROGram(s) Oral daily  pantoprazole    Tablet 40 milliGRAM(s) Oral before breakfast  povidone iodine 5% Nasal Swab 1 Application(s) Both Nostrils once  spironolactone 25 milliGRAM(s) Oral daily  vancomycin  IVPB 1000 milliGRAM(s) IV Intermittent every 12 hours    MEDICATIONS  (PRN):  acetaminophen   IVPB .. 1000 milliGRAM(s) IV Intermittent once PRN Mild Pain (1 - 3)  ALPRAZolam 0.5 milliGRAM(s) Oral every 8 hours PRN anxiety  aluminum hydroxide/magnesium hydroxide/simethicone Suspension 30 milliLiter(s) Oral every 4 hours PRN Dyspepsia  cyclobenzaprine 5 milliGRAM(s) Oral three times a day PRN Muscle Spasm  HYDROmorphone  Injectable 0.5 milliGRAM(s) IV Push every 6 hours PRN Severe Pain (7 - 10)        Vital Signs Last 24 Hrs  T(C): 36.1 (10 Kris 2022 10:39), Max: 37.7 (10 Kris 2022 05:00)  T(F): 97 (10 Kris 2022 10:39), Max: 99.8 (10 Kris 2022 05:00)  HR: 75 (10 Kris 2022 12:00) (63 - 89)  BP: 143/66 (10 Kris 2022 12:00) (103/63 - 159/76)  BP(mean): 95 (10 Kris 2022 12:00) (71 - 109)  RR: 17 (10 Kris 2022 12:00) (11 - 20)  SpO2: 100% (10 Kris 2022 12:00) (92% - 100%)    01-09-22 @ 07:01  -  01-10-22 @ 07:00  --------------------------------------------------------  IN: 1180 mL / OUT: 1800 mL / NET: -620 mL      PHYSICAL EXAM:  Constitutional: alert, NAD  Eyes: the sclera and conjunctiva were normal.   ENT: the ears and nose were normal in appearance.   Neck: the appearance of the neck was normal and the neck was supple.   Pulmonary: no respiratory distress and lungs were clear to auscultation bilaterally.   Heart: heart rate was normal and rhythm regular, normal S1 and S2  Ext: s/p R AKA   Abdomen: normal bowel sounds, soft, non-tender  Neurological: no focal deficits.   Psychiatric: the affect was normal        LABS:                        10.6   13.53 )-----------( 300      ( 10 Kris 2022 06:17 )             33.9     01-10    136  |  98  |  15  ----------------------------<  161<H>  3.9   |  29  |  0.94    Ca    9.0      10 Kris 2022 06:18      PT/INR - ( 10 Kris 2022 00:07 )   PT: 15.1 sec;   INR: 1.27          PTT - ( 10 Kris 2022 00:07 )  PTT:28.6 sec      MICROBIOLOGY:  1/7 BCx ngtd  1/6 BCx ngtd  1/5 BCx MRSA  1/5 BCx MRSA  1/5 Synovial fluid: MRSA    RADIOLOGY & ADDITIONAL STUDIES:  Reviewed

## 2022-01-10 NOTE — PROGRESS NOTE ADULT - SUBJECTIVE AND OBJECTIVE BOX
POST-OPERATIVE NOTE    Procedure: Right above knee guillotine amputation    Diagnosis/Indication:  septic knee    Surgeon: Dr Malloy    S: Pt has no complaints. Denies CP, SOB, N/V. Pain controlled with medication.    O:  T(C): 36.1 (01-10-22 @ 10:39), Max: 36.1 (01-10-22 @ 10:39)  T(F): 97 (01-10-22 @ 10:39), Max: 97 (01-10-22 @ 10:39)  HR: 75 (01-10-22 @ 12:00) (71 - 75)  BP: 143/66 (01-10-22 @ 12:00) (129/68 - 159/76)  RR: 17 (01-10-22 @ 12:00) (11 - 20)  SpO2: 100% (01-10-22 @ 12:00) (100% - 100%)  Wt(kg): --                        10.6   13.53 )-----------( 300      ( 10 Kris 2022 06:17 )             33.9     01-10    136  |  98  |  15  ----------------------------<  161<H>  3.9   |  29  |  0.94    Ca    9.0      10 Kris 2022 06:18        Gen: NAD, resting comfortably in bed  C/V: NSR  Pulm: Nonlabored breathing, no respiratory distress  Abd: soft, NT/ND  Extrem: RLE- drsg c/d/i      A/P: 66yMale s/p above procedure  Diet: CC  IVF  Pain/nausea control

## 2022-01-11 DIAGNOSIS — I50.22 CHRONIC SYSTOLIC (CONGESTIVE) HEART FAILURE: ICD-10-CM

## 2022-01-11 DIAGNOSIS — E11.51 TYPE 2 DIABETES MELLITUS WITH DIABETIC PERIPHERAL ANGIOPATHY WITHOUT GANGRENE: ICD-10-CM

## 2022-01-11 DIAGNOSIS — Z95.5 PRESENCE OF CORONARY ANGIOPLASTY IMPLANT AND GRAFT: ICD-10-CM

## 2022-01-11 DIAGNOSIS — E66.9 OBESITY, UNSPECIFIED: ICD-10-CM

## 2022-01-11 DIAGNOSIS — E11.40 TYPE 2 DIABETES MELLITUS WITH DIABETIC NEUROPATHY, UNSPECIFIED: ICD-10-CM

## 2022-01-11 DIAGNOSIS — E11.22 TYPE 2 DIABETES MELLITUS WITH DIABETIC CHRONIC KIDNEY DISEASE: ICD-10-CM

## 2022-01-11 DIAGNOSIS — M25.569 PAIN IN UNSPECIFIED KNEE: ICD-10-CM

## 2022-01-11 DIAGNOSIS — I27.20 PULMONARY HYPERTENSION, UNSPECIFIED: ICD-10-CM

## 2022-01-11 DIAGNOSIS — Z95.810 PRESENCE OF AUTOMATIC (IMPLANTABLE) CARDIAC DEFIBRILLATOR: ICD-10-CM

## 2022-01-11 DIAGNOSIS — E87.5 HYPERKALEMIA: ICD-10-CM

## 2022-01-11 DIAGNOSIS — E11.49 TYPE 2 DIABETES MELLITUS WITH OTHER DIABETIC NEUROLOGICAL COMPLICATION: ICD-10-CM

## 2022-01-11 DIAGNOSIS — F32.A DEPRESSION, UNSPECIFIED: ICD-10-CM

## 2022-01-11 DIAGNOSIS — I25.10 ATHEROSCLEROTIC HEART DISEASE OF NATIVE CORONARY ARTERY WITHOUT ANGINA PECTORIS: ICD-10-CM

## 2022-01-11 DIAGNOSIS — M00.861 ARTHRITIS DUE TO OTHER BACTERIA, RIGHT KNEE: ICD-10-CM

## 2022-01-11 DIAGNOSIS — F41.9 ANXIETY DISORDER, UNSPECIFIED: ICD-10-CM

## 2022-01-11 DIAGNOSIS — I08.1 RHEUMATIC DISORDERS OF BOTH MITRAL AND TRICUSPID VALVES: ICD-10-CM

## 2022-01-11 DIAGNOSIS — I13.0 HYPERTENSIVE HEART AND CHRONIC KIDNEY DISEASE WITH HEART FAILURE AND STAGE 1 THROUGH STAGE 4 CHRONIC KIDNEY DISEASE, OR UNSPECIFIED CHRONIC KIDNEY DISEASE: ICD-10-CM

## 2022-01-11 DIAGNOSIS — N18.2 CHRONIC KIDNEY DISEASE, STAGE 2 (MILD): ICD-10-CM

## 2022-01-11 DIAGNOSIS — L97.509 NON-PRESSURE CHRONIC ULCER OF OTHER PART OF UNSPECIFIED FOOT WITH UNSPECIFIED SEVERITY: ICD-10-CM

## 2022-01-11 DIAGNOSIS — I25.5 ISCHEMIC CARDIOMYOPATHY: ICD-10-CM

## 2022-01-11 DIAGNOSIS — E11.621 TYPE 2 DIABETES MELLITUS WITH FOOT ULCER: ICD-10-CM

## 2022-01-11 DIAGNOSIS — E78.5 HYPERLIPIDEMIA, UNSPECIFIED: ICD-10-CM

## 2022-01-11 DIAGNOSIS — M19.90 UNSPECIFIED OSTEOARTHRITIS, UNSPECIFIED SITE: ICD-10-CM

## 2022-01-11 DIAGNOSIS — J44.9 CHRONIC OBSTRUCTIVE PULMONARY DISEASE, UNSPECIFIED: ICD-10-CM

## 2022-01-11 LAB
CULTURE RESULTS: SIGNIFICANT CHANGE UP
GLUCOSE BLDC GLUCOMTR-MCNC: 175 MG/DL — HIGH (ref 70–99)
GLUCOSE BLDC GLUCOMTR-MCNC: 184 MG/DL — HIGH (ref 70–99)
GLUCOSE BLDC GLUCOMTR-MCNC: 185 MG/DL — HIGH (ref 70–99)
GLUCOSE BLDC GLUCOMTR-MCNC: 188 MG/DL — HIGH (ref 70–99)
GLUCOSE BLDC GLUCOMTR-MCNC: 197 MG/DL — HIGH (ref 70–99)
GLUCOSE BLDC GLUCOMTR-MCNC: 201 MG/DL — HIGH (ref 70–99)
HCT VFR BLD CALC: 26.5 % — LOW (ref 39–50)
HGB BLD-MCNC: 8.5 G/DL — LOW (ref 13–17)
MCHC RBC-ENTMCNC: 25.4 PG — LOW (ref 27–34)
MCHC RBC-ENTMCNC: 32.1 GM/DL — SIGNIFICANT CHANGE UP (ref 32–36)
MCV RBC AUTO: 79.1 FL — LOW (ref 80–100)
NRBC # BLD: 0 /100 WBCS — SIGNIFICANT CHANGE UP (ref 0–0)
PLATELET # BLD AUTO: 294 K/UL — SIGNIFICANT CHANGE UP (ref 150–400)
RBC # BLD: 3.35 M/UL — LOW (ref 4.2–5.8)
RBC # FLD: 17.9 % — HIGH (ref 10.3–14.5)
SPECIMEN SOURCE: SIGNIFICANT CHANGE UP
VANCOMYCIN TROUGH SERPL-MCNC: 24.5 UG/ML — HIGH (ref 10–20)
WBC # BLD: 13.52 K/UL — HIGH (ref 3.8–10.5)
WBC # FLD AUTO: 13.52 K/UL — HIGH (ref 3.8–10.5)

## 2022-01-11 PROCEDURE — 36573 INSJ PICC RS&I 5 YR+: CPT

## 2022-01-11 PROCEDURE — 76937 US GUIDE VASCULAR ACCESS: CPT | Mod: 26,59

## 2022-01-11 PROCEDURE — 99231 SBSQ HOSP IP/OBS SF/LOW 25: CPT | Mod: GC

## 2022-01-11 PROCEDURE — 99232 SBSQ HOSP IP/OBS MODERATE 35: CPT

## 2022-01-11 PROCEDURE — 99232 SBSQ HOSP IP/OBS MODERATE 35: CPT | Mod: GC

## 2022-01-11 RX ORDER — ACETAMINOPHEN 500 MG
325 TABLET ORAL EVERY 4 HOURS
Refills: 0 | Status: DISCONTINUED | OUTPATIENT
Start: 2022-01-11 | End: 2022-01-11

## 2022-01-11 RX ORDER — HYDROMORPHONE HYDROCHLORIDE 2 MG/ML
1 INJECTION INTRAMUSCULAR; INTRAVENOUS; SUBCUTANEOUS ONCE
Refills: 0 | Status: DISCONTINUED | OUTPATIENT
Start: 2022-01-11 | End: 2022-01-11

## 2022-01-11 RX ORDER — GABAPENTIN 400 MG/1
100 CAPSULE ORAL THREE TIMES A DAY
Refills: 0 | Status: DISCONTINUED | OUTPATIENT
Start: 2022-01-11 | End: 2022-01-16

## 2022-01-11 RX ORDER — SODIUM CHLORIDE 9 MG/ML
10 INJECTION INTRAMUSCULAR; INTRAVENOUS; SUBCUTANEOUS
Refills: 0 | Status: DISCONTINUED | OUTPATIENT
Start: 2022-01-11 | End: 2022-01-27

## 2022-01-11 RX ORDER — CHLORHEXIDINE GLUCONATE 213 G/1000ML
1 SOLUTION TOPICAL
Refills: 0 | Status: DISCONTINUED | OUTPATIENT
Start: 2022-01-11 | End: 2022-01-27

## 2022-01-11 RX ORDER — HYDROMORPHONE HYDROCHLORIDE 2 MG/ML
1 INJECTION INTRAMUSCULAR; INTRAVENOUS; SUBCUTANEOUS ONCE
Refills: 0 | Status: DISCONTINUED | OUTPATIENT
Start: 2022-01-12 | End: 2022-01-12

## 2022-01-11 RX ORDER — ACETAMINOPHEN 500 MG
650 TABLET ORAL EVERY 6 HOURS
Refills: 0 | Status: DISCONTINUED | OUTPATIENT
Start: 2022-01-11 | End: 2022-01-14

## 2022-01-11 RX ORDER — OXYCODONE HYDROCHLORIDE 5 MG/1
10 TABLET ORAL ONCE
Refills: 0 | Status: DISCONTINUED | OUTPATIENT
Start: 2022-01-11 | End: 2022-01-11

## 2022-01-11 RX ORDER — INSULIN LISPRO 100/ML
7 VIAL (ML) SUBCUTANEOUS
Refills: 0 | Status: DISCONTINUED | OUTPATIENT
Start: 2022-01-11 | End: 2022-01-12

## 2022-01-11 RX ORDER — INSULIN GLARGINE 100 [IU]/ML
22 INJECTION, SOLUTION SUBCUTANEOUS AT BEDTIME
Refills: 0 | Status: DISCONTINUED | OUTPATIENT
Start: 2022-01-11 | End: 2022-01-12

## 2022-01-11 RX ORDER — OXYCODONE HYDROCHLORIDE 5 MG/1
5 TABLET ORAL EVERY 4 HOURS
Refills: 0 | Status: DISCONTINUED | OUTPATIENT
Start: 2022-01-11 | End: 2022-01-14

## 2022-01-11 RX ADMIN — HEPARIN SODIUM 5000 UNIT(S): 5000 INJECTION INTRAVENOUS; SUBCUTANEOUS at 05:29

## 2022-01-11 RX ADMIN — Medication 2: at 17:36

## 2022-01-11 RX ADMIN — GABAPENTIN 100 MILLIGRAM(S): 400 CAPSULE ORAL at 15:33

## 2022-01-11 RX ADMIN — HYDROMORPHONE HYDROCHLORIDE 1 MILLIGRAM(S): 2 INJECTION INTRAMUSCULAR; INTRAVENOUS; SUBCUTANEOUS at 07:51

## 2022-01-11 RX ADMIN — Medication 2: at 13:12

## 2022-01-11 RX ADMIN — OXYCODONE HYDROCHLORIDE 10 MILLIGRAM(S): 5 TABLET ORAL at 22:00

## 2022-01-11 RX ADMIN — HYDROMORPHONE HYDROCHLORIDE 0.5 MILLIGRAM(S): 2 INJECTION INTRAMUSCULAR; INTRAVENOUS; SUBCUTANEOUS at 02:42

## 2022-01-11 RX ADMIN — HYDROMORPHONE HYDROCHLORIDE 0.5 MILLIGRAM(S): 2 INJECTION INTRAMUSCULAR; INTRAVENOUS; SUBCUTANEOUS at 00:37

## 2022-01-11 RX ADMIN — INSULIN GLARGINE 22 UNIT(S): 100 INJECTION, SOLUTION SUBCUTANEOUS at 21:53

## 2022-01-11 RX ADMIN — HEPARIN SODIUM 5000 UNIT(S): 5000 INJECTION INTRAVENOUS; SUBCUTANEOUS at 15:33

## 2022-01-11 RX ADMIN — Medication 25 MICROGRAM(S): at 05:29

## 2022-01-11 RX ADMIN — GABAPENTIN 100 MILLIGRAM(S): 400 CAPSULE ORAL at 21:28

## 2022-01-11 RX ADMIN — OXYCODONE HYDROCHLORIDE 10 MILLIGRAM(S): 5 TABLET ORAL at 21:29

## 2022-01-11 RX ADMIN — CYCLOBENZAPRINE HYDROCHLORIDE 5 MILLIGRAM(S): 10 TABLET, FILM COATED ORAL at 04:39

## 2022-01-11 RX ADMIN — Medication 4: at 07:13

## 2022-01-11 RX ADMIN — PANTOPRAZOLE SODIUM 40 MILLIGRAM(S): 20 TABLET, DELAYED RELEASE ORAL at 07:09

## 2022-01-11 RX ADMIN — HEPARIN SODIUM 5000 UNIT(S): 5000 INJECTION INTRAVENOUS; SUBCUTANEOUS at 21:29

## 2022-01-11 RX ADMIN — DULOXETINE HYDROCHLORIDE 20 MILLIGRAM(S): 30 CAPSULE, DELAYED RELEASE ORAL at 13:12

## 2022-01-11 RX ADMIN — Medication 2: at 00:28

## 2022-01-11 RX ADMIN — OXYCODONE HYDROCHLORIDE 5 MILLIGRAM(S): 5 TABLET ORAL at 16:33

## 2022-01-11 RX ADMIN — Medication 7 UNIT(S): at 17:36

## 2022-01-11 RX ADMIN — SPIRONOLACTONE 25 MILLIGRAM(S): 25 TABLET, FILM COATED ORAL at 05:29

## 2022-01-11 RX ADMIN — CYCLOBENZAPRINE HYDROCHLORIDE 5 MILLIGRAM(S): 10 TABLET, FILM COATED ORAL at 13:22

## 2022-01-11 RX ADMIN — Medication 40 MILLIGRAM(S): at 05:29

## 2022-01-11 RX ADMIN — OXYCODONE HYDROCHLORIDE 5 MILLIGRAM(S): 5 TABLET ORAL at 15:33

## 2022-01-11 RX ADMIN — HYDROMORPHONE HYDROCHLORIDE 1 MILLIGRAM(S): 2 INJECTION INTRAMUSCULAR; INTRAVENOUS; SUBCUTANEOUS at 06:51

## 2022-01-11 RX ADMIN — Medication 1000 MILLIGRAM(S): at 02:41

## 2022-01-11 RX ADMIN — HYDROMORPHONE HYDROCHLORIDE 0.5 MILLIGRAM(S): 2 INJECTION INTRAMUSCULAR; INTRAVENOUS; SUBCUTANEOUS at 13:40

## 2022-01-11 RX ADMIN — HYDROMORPHONE HYDROCHLORIDE 0.5 MILLIGRAM(S): 2 INJECTION INTRAMUSCULAR; INTRAVENOUS; SUBCUTANEOUS at 13:22

## 2022-01-11 RX ADMIN — ATORVASTATIN CALCIUM 80 MILLIGRAM(S): 80 TABLET, FILM COATED ORAL at 21:28

## 2022-01-11 NOTE — SWALLOW BEDSIDE ASSESSMENT ADULT - NS SPL SWALLOW CLINIC TRIAL FT
Oral phase was unremarkable. Mastication of solids was timely and efficient. Swallow trigger palpated. No clinical overt s/s of aspiration appreciated. Pt denied globus sensation.

## 2022-01-11 NOTE — DIETITIAN INITIAL EVALUATION ADULT. - OTHER INFO
pt is a 66 year old male with hx of CAD, CHF (EF 20-25%), DM, 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 Castro (explant and abx spacer exchange), transferred here from [facility] for recurrent PJI for repeat spacer exchange and possible flap coverage with Dr. Vaughn/Dr. Bowen. Presents with experiencing atraumatic worsening R knee pain x 1 month. Notes he went to a procedure center where his knee was aspirated 3 weeks ago. Pt was seen at [facility]  where his knee was aspirated with 283k cell count. CBW: 214lbs. Noted with AKA; Adjusted BW: 154.6lbs. Pt does not remember UBW. Weight is prone to fluid shifts r/t receiving spironolactone/furosemide. Reported appetite is good and consumes 75% of his meals. Preferences obtained: No fish.  Educated pt regarding the diabetic diet/DASH/TLC diet, and pt is familiar with mostly the diabetic diet. 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 Explained to pt DASH/TLC diet with possible fluid restriction, and pt refused further diet education. Skin: surgical incision R AKA; Freddie: 15. Pain: R AKA-receiving opoid, oxycodone, and Dilaudid IV as per Flowsheet. BM WDL-last BM and reported to be "too good". pt is a 66 year old male with hx of CAD, CHF (EF 20-25%), DM, 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 Castro (explant and abx spacer exchange), transferred here from [facility] for recurrent PJI for repeat spacer exchange and possible flap coverage with Dr. Vaughn/Dr. Bowen. Presents with experiencing atraumatic worsening R knee pain x 1 month. Notes he went to a procedure center where his knee was aspirated 3 weeks ago. Pt was seen at [facility]  where his knee was aspirated with 283k cell count. Patient received a antibiotic spacer replacement on 1/6/2021. Noted with AKA surgery on 1/10/2021;    Patient seen at bedside and was alert and oriented x3. Reported appetite is good and consumes 75% of his meals. Preferences obtained: No fish.  Educated pt regarding the diabetic diet/DASH/TLC diet, and pt is familiar with mostly the diabetic diet. 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 Explained to pt DASH/TLC diet with possible fluid restriction, and pt refused further diet education. Seen by SLP on 1/11/2021 following a failed JOHN PAUL by not being able to pass probe r/t xerostomia 2/2 prolonged NPO status. SLP rec regular solids/thin liquids. CBW: 214lbs. Adjusted BW body weight s/p AKA 154lbs (based off 16%). Pt does not remember UBW. Weight is prone to fluid shifts r/t receiving spironolactone/furosemide. Skin: surgical incision R AKA; R lower buttocks stage 2 pressure injury; Freddie: 15. Pain: R AKA-receiving opoid, oxycodone, and Dilaudid IV as per Flowsheet. BM WDL-last BM and reported to be "too good".

## 2022-01-11 NOTE — SWALLOW BEDSIDE ASSESSMENT ADULT - SWALLOW EVAL: DIAGNOSIS
Oropharyngeal swallow was clinically judged to be WFL. Pt with few dysphagia risk factors. Low suspicion for UES dysfunction at this time. However, it should be noted that this cannot be r/o without a modified barium swallow study.

## 2022-01-11 NOTE — PROGRESS NOTE ADULT - SUBJECTIVE AND OBJECTIVE BOX
INFECTIOUS DISEASES CONSULT FOLLOW-UP NOTE    INTERVAL HPI/OVERNIGHT EVENTS:  No event overnight  JOHN PAUL cancelled since probe couldn't pass   patient upset but otherwise feels fine     ROS:   Constitutional, eyes, ENT, cardiovascular, respiratory, gastrointestinal, genitourinary, integumentary, neurological, psychiatric and heme/lymph are otherwise negative other than noted above       ANTIBIOTICS/RELEVANT:    MEDICATIONS  (STANDING):  atorvastatin 80 milliGRAM(s) Oral at bedtime  dextrose 40% Gel 15 Gram(s) Oral once  dextrose 5%. 1000 milliLiter(s) (50 mL/Hr) IV Continuous <Continuous>  dextrose 5%. 1000 milliLiter(s) (100 mL/Hr) IV Continuous <Continuous>  dextrose 50% Injectable 25 Gram(s) IV Push once  dextrose 50% Injectable 12.5 Gram(s) IV Push once  dextrose 50% Injectable 25 Gram(s) IV Push once  DULoxetine 20 milliGRAM(s) Oral daily  furosemide    Tablet 40 milliGRAM(s) Oral daily  glucagon  Injectable 1 milliGRAM(s) IntraMuscular once  heparin   Injectable 5000 Unit(s) SubCutaneous every 8 hours  insulin glargine Injectable (LANTUS) 17 Unit(s) SubCutaneous at bedtime  insulin lispro (ADMELOG) corrective regimen sliding scale   SubCutaneous every 6 hours  insulin lispro Injectable (ADMELOG) 5 Unit(s) SubCutaneous three times a day with meals  levothyroxine 25 MICROGram(s) Oral daily  pantoprazole    Tablet 40 milliGRAM(s) Oral before breakfast  povidone iodine 5% Nasal Swab 1 Application(s) Both Nostrils once  spironolactone 25 milliGRAM(s) Oral daily    MEDICATIONS  (PRN):  ALPRAZolam 0.5 milliGRAM(s) Oral every 8 hours PRN anxiety  aluminum hydroxide/magnesium hydroxide/simethicone Suspension 30 milliLiter(s) Oral every 4 hours PRN Dyspepsia  cyclobenzaprine 5 milliGRAM(s) Oral three times a day PRN Muscle Spasm  HYDROmorphone  Injectable 0.5 milliGRAM(s) IV Push every 6 hours PRN Severe Pain (7 - 10)        Vital Signs Last 24 Hrs  T(C): 36.8 (11 Jan 2022 06:16), Max: 36.8 (11 Jan 2022 06:16)  T(F): 98.2 (11 Jan 2022 06:16), Max: 98.2 (11 Jan 2022 06:16)  HR: 80 (11 Jan 2022 12:17) (73 - 87)  BP: 112/53 (11 Jan 2022 12:17) (109/58 - 136/58)  BP(mean): 77 (11 Jan 2022 12:17) (76 - 87)  RR: 18 (11 Jan 2022 12:17) (16 - 18)  SpO2: 98% (11 Jan 2022 12:17) (95% - 99%)    01-10-22 @ 07:01  -  01-11-22 @ 07:00  --------------------------------------------------------  IN: 180 mL / OUT: 1005 mL / NET: -825 mL    01-11-22 @ 07:01  -  01-11-22 @ 13:08  --------------------------------------------------------  IN: 0 mL / OUT: 450 mL / NET: -450 mL      PHYSICAL EXAM:  Constitutional: alert, NAD  Eyes: the sclera and conjunctiva were normal.   ENT: the ears and nose were normal in appearance.   Neck: the appearance of the neck was normal and the neck was supple.   Pulmonary: no respiratory distress and lungs were clear to auscultation bilaterally.   Heart: heart rate was normal and rhythm regular, normal S1 and S2  Ext: R AKA   Abdomen: normal bowel sounds, soft, non-tender  Neurological: no focal deficits.           LABS:                        9.2    14.91 )-----------( 274      ( 10 Kris 2022 23:05 )             29.7     01-10    132<L>  |  94<L>  |  17  ----------------------------<  194<H>  4.6   |  28  |  1.15    Ca    8.0<L>      10 Kris 2022 23:05  Phos  4.0     01-10  Mg     1.4     01-10      PT/INR - ( 10 Kris 2022 23:05 )   PT: 15.1 sec;   INR: 1.27          PTT - ( 10 Kris 2022 23:05 )  PTT:25.7 sec      MICROBIOLOGY:  1/7 BCx ngtd  1/6 BCx ngtd  1/5 BCx MRSA  1/5 BCx MRSA  1/5 Synovial fluid: MRSA      RADIOLOGY & ADDITIONAL STUDIES:  Gallium scan  Impression: No evidence of ICD infection is noted. There is activity on   the medial aspect of the right leg just above the knee which may be   related to patient's recent surgery.    Please note that gallium is insensitive to possible infection at the tip   of the ICD wires. Transesophageal echocardiography is pending.

## 2022-01-11 NOTE — SWALLOW BEDSIDE ASSESSMENT ADULT - SLP PERTINENT HISTORY OF CURRENT PROBLEM
PMHx of CAD, CHF (EF 20-25%), DM, R TKA several years ago c/b recurrent PJI and revisions, now s/p guillotine R AKA 1/10/22

## 2022-01-11 NOTE — PROGRESS NOTE ADULT - SUBJECTIVE AND OBJECTIVE BOX
INTERVAL HPI/OVERNIGHT EVENTS:      Patient is a 66y old  Male who presents with a chief complaint of R Knee Pain (10 Kris 2022 08:32)  s/p R AKA 1/10/2022.  Pain overnight. Good appetite.    FSG & insulin:  Yesterday -   Dinner . Lispro 3+4.  Bedtime . Lantus 17 and lispro 5+2.  today -   Breakfast . Lispro 4. NPO for JOHN PAUL.    Pt reports the following symptoms:    CONSTITUTIONAL:  Negative fever or chills, feels well, good appetite  EYES:  Negative  blurry vision or double vision  CARDIOVASCULAR:  Negative for chest pain or palpitations  RESPIRATORY:  Negative for cough, wheezing, or SOB   GASTROINTESTINAL:  Negative for nausea, vomiting, diarrhea, constipation, or abdominal pain  GENITOURINARY:  Negative frequency, urgency or dysuria  NEUROLOGIC:  No headache, confusion, dizziness, lightheadedness      MEDICATIONS  (STANDING):  atorvastatin 80 milliGRAM(s) Oral at bedtime  chlorhexidine 2% Cloths 1 Application(s) Topical <User Schedule>  dextrose 40% Gel 15 Gram(s) Oral once  dextrose 5%. 1000 milliLiter(s) (50 mL/Hr) IV Continuous <Continuous>  dextrose 5%. 1000 milliLiter(s) (100 mL/Hr) IV Continuous <Continuous>  dextrose 50% Injectable 25 Gram(s) IV Push once  dextrose 50% Injectable 12.5 Gram(s) IV Push once  dextrose 50% Injectable 25 Gram(s) IV Push once  DULoxetine 20 milliGRAM(s) Oral daily  furosemide    Tablet 40 milliGRAM(s) Oral daily  gabapentin 100 milliGRAM(s) Oral three times a day  glucagon  Injectable 1 milliGRAM(s) IntraMuscular once  heparin   Injectable 5000 Unit(s) SubCutaneous every 8 hours  insulin glargine Injectable (LANTUS) 22 Unit(s) SubCutaneous at bedtime  insulin lispro (ADMELOG) corrective regimen sliding scale   SubCutaneous every 6 hours  insulin lispro Injectable (ADMELOG) 7 Unit(s) SubCutaneous three times a day before meals  levothyroxine 25 MICROGram(s) Oral daily  pantoprazole    Tablet 40 milliGRAM(s) Oral before breakfast  povidone iodine 5% Nasal Swab 1 Application(s) Both Nostrils once  spironolactone 25 milliGRAM(s) Oral daily    MEDICATIONS  (PRN):  acetaminophen     Tablet .. 650 milliGRAM(s) Oral every 6 hours PRN Mild Pain (1 - 3)  ALPRAZolam 0.5 milliGRAM(s) Oral every 8 hours PRN anxiety  aluminum hydroxide/magnesium hydroxide/simethicone Suspension 30 milliLiter(s) Oral every 4 hours PRN Dyspepsia  cyclobenzaprine 5 milliGRAM(s) Oral three times a day PRN Muscle Spasm  oxyCODONE    IR 5 milliGRAM(s) Oral every 4 hours PRN Moderate Pain (4 - 6)  oxyCODONE    IR 10 milliGRAM(s) Oral once PRN Severe Pain (7 - 10)  sodium chloride 0.9% lock flush 10 milliLiter(s) IV Push every 1 hour PRN Pre/post blood products, medications, blood draw, and to maintain line patency      PHYSICAL EXAM  Vital Signs Last 24 Hrs  T(C): 36.7 (11 Jan 2022 14:29), Max: 36.8 (11 Jan 2022 06:16)  T(F): 98.1 (11 Jan 2022 14:29), Max: 98.2 (11 Jan 2022 06:16)  HR: 80 (11 Jan 2022 12:17) (75 - 87)  BP: 112/53 (11 Jan 2022 12:17) (109/58 - 129/61)  BP(mean): 77 (11 Jan 2022 12:17) (76 - 80)  RR: 18 (11 Jan 2022 12:17) (16 - 18)  SpO2: 98% (11 Jan 2022 12:17) (95% - 98%)    Constitutional: NAD.   HEENT: NCAT, MMM, OP clear, EOMI, , no proptosis or lid retraction  Neck: no thyromegaly or palpable thyroid nodules   Respiratory: lungs CTAB.  Cardiovascular: regular rhythm, normal S1 and S2, no audible murmurs  GI: soft, NT/ND, no masses/HSM appreciated.  Neurology: no tremors  Skin: no visible rashes/lesions, R AKA   Psychiatric: AAO x 3, normal affect/mood.    LABS:                        9.2    14.91 )-----------( 274      ( 10 Kris 2022 23:05 )             29.7     01-10    132<L>  |  94<L>  |  17  ----------------------------<  194<H>  4.6   |  28  |  1.15    Ca    8.0<L>      10 Kris 2022 23:05  Phos  4.0     01-10  Mg     1.4     01-10      PT/INR - ( 10 Kris 2022 23:05 )   PT: 15.1 sec;   INR: 1.27          PTT - ( 10 Kris 2022 23:05 )  PTT:25.7 sec    Thyroid Stimulating Hormone, Serum: 2.850 uIU/mL (01-08 @ 08:25)  Thyroid Stimulating Hormone, Serum: 1.68 uIU/mL (03-12 @ 17:00)      HbA1C:   CAPILLARY BLOOD GLUCOSE      POCT Blood Glucose.: 185 mg/dL (11 Jan 2022 12:34)  POCT Blood Glucose.: 188 mg/dL (11 Jan 2022 09:52)  POCT Blood Glucose.: 201 mg/dL (11 Jan 2022 06:34)  POCT Blood Glucose.: 184 mg/dL (11 Jan 2022 00:18)  POCT Blood Glucose.: 166 mg/dL (10 Kris 2022 21:55)  POCT Blood Glucose.: 238 mg/dL (10 Kris 2022 16:35)

## 2022-01-11 NOTE — PROGRESS NOTE ADULT - SUBJECTIVE AND OBJECTIVE BOX
O/N: satting well, R AKA stump C/D/I, vanco trough 24.5 - dose held  1/10: s/p right AKA. transferred to vascular surgery service            ---------------------------------------------------------------------------  PLEASE CHECK WHEN PRESENT:  [ x ] Heart Failure  [  ] Acute  [  ] Acute on Chronic  [x  ] Chronic  -------------------------------------------------------------------  [  ]Diastolic [HFpEF]  [x  ]Systolic [HFrEF]  [  ]Combined [HFpEF & HFrEF]  [  ] afib  [x  ] hypertensive heart disease  [  ]Other:  -------------------------------------------------------------------  [ ] Respiratory failure  [ ] Acute cor pulmonale  [ ] Asthma/COPD Exacerbation  [ ] Pleural effusion  [ ] Aspiration pneumonia  -------------------------------------------------------------------  [  ]MOISES  [  ]ATN  [  ]Reneal Medullary Necrosis  [  ]Renal Cortical Necrosis  [  ]Other Pathological Lesions:    [  ]CKD 1  [  ]CKD 2  [  ]CKD 3  [  ]CKD 4  [  ]CKD 5  [  ]Other  -------------------------------------------------------------------  [ x ]Diabetes  [  ] Diabetic PVD Ulcer  [  ] Neuropathic ulcer to DM  [  ] Diabetes with Nephropathy  [  ] Osteomyelitis due to diabetes  --------------------------------------------------------------------  [  ]Malnutrition: See Nutrition Note  [  ]Cachexia  [  ]Other:   [  ]Supplement Ordered:  [  ]Morbid Obesity (BMI >=40]  ---------------------------------------------------------------------  [ ] Sepsis/severe sepsis/septic shock  [ ] UTI  [ ] Pneumonia  -----------------------------------------------------------------------  [ ] Acidosis/alkalosis  [ ] Fluid overload  [ ] Hypokalemia  [ ] Hyperkalemia  [ ] Hypomagnesemia  [ ] Hypophosphatemia  [ ] Hyperphosphatemia  ------------------------------------------------------------------------  [ ] Acute blood loss anemia  [ ] Post op blood loss anemia  [ ] Iron deficiency anemia  [ ] Anemia due to chronic disease  [ ] Hypercoagulable state  ----------------------------------------------------------------------  [ ] Cerebral infarction  [ ] Transient ischemia attack  [ ] Encephalopathy    A/P: 67yo M hx of CAD, CHF (EF 20-25%), DM, R TKA several years ago c/b recurrent PJI and revisions, 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 transferred to vascular surgery service      SUBJECTIVE: Patient seen at bedside resting comfortably, complaining of some pain at stump site. No CP, SOB, fevers, chillls.     Vital Signs Last 24 Hrs  T(C): 36.8 (11 Jan 2022 06:16), Max: 36.8 (10 Kris 2022 08:06)  T(F): 98.2 (11 Jan 2022 06:16), Max: 98.3 (10 Kris 2022 08:06)  HR: 87 (11 Jan 2022 06:21) (71 - 87)  BP: 129/61 (11 Jan 2022 06:21) (103/63 - 159/76)  BP(mean): 76 (10 Kris 2022 16:31) (76 - 109)  RR: 18 (11 Jan 2022 06:21) (11 - 20)  SpO2: 95% (11 Jan 2022 00:20) (95% - 100%)    Physical Exam:  General: NAD  Pulmonary: Nonlabored breathing, no respiratory distress  Cardiovascular: NSR  Abdominal: soft, NT/ND,   Extremities: Right AKA guillotine stump dressing changed. healthy tissue noted with minimal bleeding. Xeroform, gauze, kerlix and ace wraps used to redress stump.    Lines/drains/tubes:    I&O's Summary    10 Kris 2022 07:01  -  11 Jan 2022 07:00  --------------------------------------------------------  IN: 180 mL / OUT: 615 mL / NET: -435 mL        LABS:                        9.2    14.91 )-----------( 274      ( 10 Kris 2022 23:05 )             29.7     01-10    132<L>  |  94<L>  |  17  ----------------------------<  194<H>  4.6   |  28  |  1.15    Ca    8.0<L>      10 Kris 2022 23:05  Phos  4.0     01-10  Mg     1.4     01-10      PT/INR - ( 10 Kris 2022 23:05 )   PT: 15.1 sec;   INR: 1.27          PTT - ( 10 Kris 2022 23:05 )  PTT:25.7 sec    CAPILLARY BLOOD GLUCOSE      POCT Blood Glucose.: 201 mg/dL (11 Jan 2022 06:34)  POCT Blood Glucose.: 184 mg/dL (11 Jan 2022 00:18)  POCT Blood Glucose.: 166 mg/dL (10 Kris 2022 21:55)  POCT Blood Glucose.: 238 mg/dL (10 Kris 2022 16:35)  POCT Blood Glucose.: 137 mg/dL (10 Kris 2022 11:17)        RADIOLOGY & ADDITIONAL STUDIES:            ---------------------------------------------------------------------------  PLEASE CHECK WHEN PRESENT:  [ x ] Heart Failure  [  ] Acute  [  ] Acute on Chronic  [x  ] Chronic  -------------------------------------------------------------------  [  ]Diastolic [HFpEF]  [x  ]Systolic [HFrEF]  [  ]Combined [HFpEF & HFrEF]  [  ] afib  [x  ] hypertensive heart disease  [  ]Other:  -------------------------------------------------------------------  [ ] Respiratory failure  [ ] Acute cor pulmonale  [ ] Asthma/COPD Exacerbation  [ ] Pleural effusion  [ ] Aspiration pneumonia  -------------------------------------------------------------------  [  ]MOISES  [  ]ATN  [  ]Reneal Medullary Necrosis  [  ]Renal Cortical Necrosis  [  ]Other Pathological Lesions:    [  ]CKD 1  [  ]CKD 2  [  ]CKD 3  [  ]CKD 4  [  ]CKD 5  [  ]Other  -------------------------------------------------------------------  [ x ]Diabetes  [  ] Diabetic PVD Ulcer  [  ] Neuropathic ulcer to DM  [  ] Diabetes with Nephropathy  [  ] Osteomyelitis due to diabetes  --------------------------------------------------------------------  [  ]Malnutrition: See Nutrition Note  [  ]Cachexia  [  ]Other:   [  ]Supplement Ordered:  [  ]Morbid Obesity (BMI >=40]  ---------------------------------------------------------------------  [ ] Sepsis/severe sepsis/septic shock  [ ] UTI  [ ] Pneumonia  -----------------------------------------------------------------------  [ ] Acidosis/alkalosis  [ ] Fluid overload  [ ] Hypokalemia  [ ] Hyperkalemia  [ ] Hypomagnesemia  [ ] Hypophosphatemia  [ ] Hyperphosphatemia  ------------------------------------------------------------------------  [ ] Acute blood loss anemia  [ ] Post op blood loss anemia  [ ] Iron deficiency anemia  [ ] Anemia due to chronic disease  [ ] Hypercoagulable state  ----------------------------------------------------------------------  [ ] Cerebral infarction  [ ] Transient ischemia attack  [ ] Encephalopathy    A/P: 65yo M hx of CAD, CHF (EF 20-25%), DM, R TKA several years ago c/b recurrent PJI and revisions, 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

## 2022-01-11 NOTE — DIETITIAN INITIAL EVALUATION ADULT. - PERTINENT LABORATORY DATA
RBC: 3.77 (L)  HGB: 9.2 (L)   HCT: 29.7 (L)   INR: 1.27 (H)  POCT glu: 185 (H)  Na: 132 (L)   BUN: 10   Cr: 17   glu: 194 (H)

## 2022-01-11 NOTE — DIETITIAN INITIAL EVALUATION ADULT. - ADD RECOMMEND
1. Monitor PO intake 2. Monitor weekly weights 3. Rec Consistent CHO diet w/ evening snack, DASH/TLC diet with 1200ml fluid restriction. 1. Monitor PO intake 2. Monitor weekly weights 3. Rec Consistent CHO diet w/ evening snack, DASH/TLC diet

## 2022-01-11 NOTE — CONSULT NOTE ADULT - SUBJECTIVE AND OBJECTIVE BOX
Vascular Access Service Consult Note    66[facility] ISSUES - PROBLEM Dx:      Diagnosis: right knee infection    Indications for Vascular Access (Check all that apply)  [ x ]  Antibiotic Therapy       Antibiotic Prescribed:    vancomycin x 4-6 weeks                                                                                    [  ]  IV Hydration  [  ]  Total Parenteral Nutrition  [  ]  Chemotherapy  [  ]  Difficult Venous Access  [  ]  CVP monitoring  [  ]  Medications with high potential for tissue necrosis on extravasation  [  ]  Other    Screening (Check all that apply)  Previous Radiation to chest  [  ] Yes      [x  ]  No  Breast Cancer                          [  ] Left     [  ]  Right    [x  ]  No  Lymph Node Dissection         [  ] Left     [  ]  Right    [ x ]  No  Pacemaker or ICD                   [ x ] Left     [  ]  Right    [  ]  No  Upper Extremity DVT             [  ] Left     [  ]  Right    [ x ]  No  Chronic Kidney Disease         [  ]  Yes     [ x ]  No  Hemodialysis                           [  ]  Yes     [x  ]  No  AV Fistula/ Graft                     [  ]  Left    [  ]  Right    [  x]  No  Temp>101F in past 24 H       [  ]  Yes     [ x ]  No  H/O PICC/Midline                   [  ]  Yes     [x  ]  No    Lab data:                        9.2    14.91 )-----------( 274      ( 10 Kris 2022 23:05 )             29.7     01-10    132<L>  |  94<L>  |  17  ----------------------------<  194<H>  4.6   |  28  |  1.15    Ca    8.0<L>      10 Kris 2022 23:05  Phos  4.0     01-10  Mg     1.4     01-10      PT/INR - ( 10 Kris 2022 23:05 )   PT: 15.1 sec;   INR: 1.27          PTT - ( 10 Kris 2022 23:05 )  PTT:25.7 sec          I have reviewed the chart, interviewed and examined the patient and determined that this patient:  [  x] Is a candidate for a PICC line  [  ] Is a candidate for a Midline  [  ] Is not a candidate for vascular access device (reason)    Lumens:    [  x] Single  [  ] Double

## 2022-01-11 NOTE — DIETITIAN INITIAL EVALUATION ADULT. - DIET TYPE
DASH/TLC (sodium and cholesterol restricted diet)/1200ml/consistent carbohydrate (evening snack) DASH/TLC (sodium and cholesterol restricted diet)/consistent carbohydrate (evening snack)

## 2022-01-11 NOTE — PROGRESS NOTE ADULT - ASSESSMENT
66M hx of CAD with CABG , CHF (EF 20-25%) with AICD, T2DM c/b neuropathy with hx of diabetic ulcer, HLD. HTN, COPD, diverticulitis, celiac disease, anxiety and depression, cholecystostomy, R hip replacement 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 Castro (explant and abx spacer exchange), transferred here from [facility] for recurrent PJI for repeat spacer exchange and possible flap coverage with Dr. Vaughn/Dr. Bowen. Pt has been experiencing atraumatic worsening R knee pain x 1 month. Notes he went to a procedure center where his knee was aspirated 3 weeks ago. Pt was seen at [facility] yesterday where his knee was aspirated with 283k cell count. WBC 14, ESR 78, +, AVSS. Pt started on vancomycin + rifampin. 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 Now s/p Replacement, antibiotic spacer 06-Jan-2022. Also with hyperglycemia.  now s/p R AKA on vascular service     A1C: 9.0%  Weight: 97kg BMI 28  Cr/GFR: 0.9/104  EF: 20-25%

## 2022-01-11 NOTE — PROGRESS NOTE ADULT - ASSESSMENT
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   Initially underwent antibiotic cement spacer exchange, I&D and revision gastroc flap by PRS on 1/6.  Patient already had multiple recurrence and he had flank pus in his R knee.  Now s/p AKA on 1/10.   Gallium scan negative for ICD infection.  Awaiting repeat JOHN PAUL.    - cont vancomycin 1250mg IV q12h, check trough before 4th dose, goal 15-20  - obtain JOHN PAUL to r/o endocarditis  - f/u BCx (clears since 1/6)  - OK to place single lumen PICC   - duration of abx will be 4 vs 6 weeks, pending JOHN PAUL       Team 2 will follow you.    Case d/w primary team.    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   Initially underwent antibiotic cement spacer exchange, I&D and revision gastroc flap by PRS on 1/6.  Patient already had multiple recurrence and he had flank pus in his R knee.  Now s/p AKA on 1/10.   Gallium scan negative for ICD infection.  Awaiting repeat JOHN PAUL.    - cont vancomycin 1000mg IV q12h, check trough before 4th dose, goal 15-20  - obtain JOHN PAUL to r/o endocarditis  - f/u BCx (clears since 1/6)  - OK to place single lumen PICC   - duration of abx will be 4 vs 6 weeks, pending JOHN PAUL       Team 2 will follow you.    Case d/w primary team.    Marta Ruffin MD, MS  Infectious Disease attending  work cell [phone]   For any questions during evening/weekend/holiday, please page ID on call    Yes...

## 2022-01-11 NOTE — PROGRESS NOTE ADULT - PROBLEM SELECTOR PLAN 1
Please increase lantus  to 22 units at night .  Please increase lispro to 7  units before each meal.  Please continue lispro moderate dose sliding scale four times daily with meals and at bedtime    Pt's fingerstick glucose goal is 100-180.    Will continue to monitor     For discharge, pt can continue    Pt can follow up at discharge with Dr Schmitt at scheduled appt. 2/8.  Will discuss case with Dr. Luevano    and update primary team.

## 2022-01-11 NOTE — PROGRESS NOTE ADULT - SUBJECTIVE AND OBJECTIVE BOX
Ortho Note    Pt comfortable without complaints, pain controlled. Endorses significant improve in RLE pain compared to yesterday morning before surgery.   Denies CP, SOB, N/V, numbness/tingling     Vital Signs Last 24 Hrs  T(C): 36.8 (01-11-22 @ 06:16), Max: 36.8 (01-11-22 @ 06:16)  T(F): 98.2 (01-11-22 @ 06:16), Max: 98.2 (01-11-22 @ 06:16)  HR: 87 (01-11-22 @ 06:21) (84 - 87)  BP: 129/61 (01-11-22 @ 06:21) (117/58 - 129/61)  BP(mean): --  RR: 18 (01-11-22 @ 06:21) (16 - 18)  SpO2: --  AVSS    General: Pt Alert and oriented, NAD  RLE AKA Stump DSG C/D/I  RLE sensory exam unable to perform as pt is s/p R AKA on 1/10; SILT in upper thigh region proximal to R AKA procedure   Sensation of LLE present in saph/sural/tibial/dpn/spn distribution      A/P: 66yMale POD#1 s/p R AKA with Dr Malloy  - Stable  - Pain Control  - DVT ppx: sqh   - care per primary team (vascular)  - vascular plans to take pt back to OR on fri 1/14 for wound closure of R AKA flap    Ortho Pager [phone] Ortho Note    Pt comfortable without complaints, pain controlled. Endorses significant improve in RLE pain compared to yesterday morning before surgery.   Denies CP, SOB, N/V, numbness/tingling     Vital Signs Last 24 Hrs  T(C): 36.8 (01-11-22 @ 06:16), Max: 36.8 (01-11-22 @ 06:16)  T(F): 98.2 (01-11-22 @ 06:16), Max: 98.2 (01-11-22 @ 06:16)  HR: 87 (01-11-22 @ 06:21) (84 - 87)  BP: 129/61 (01-11-22 @ 06:21) (117/58 - 129/61)  BP(mean): --  RR: 18 (01-11-22 @ 06:21) (16 - 18)  SpO2: --  AVSS    General: Pt Alert and oriented, NAD  RLE AKA Stump DSG C/D/I  RLE sensory exam unable to perform as pt is s/p R AKA on 1/10; SILT in upper thigh region proximal to R AKA procedure   Sensation of LLE present in saph/sural/tibial/dpn/spn distribution      A/P: 66yMale POD#1 s/p R AKA with Dr Malloy  - Stable  - Pain Control  - DVT ppx: sqh   - care per primary team (vascular)  - f/u w/ dr edmond outpt regarding L knee   - vascular plans to take pt back to OR on fri 1/14 for wound closure of R AKA flap    Ortho Pager [phone] Ortho Note    Pt comfortable without complaints, pain controlled. Endorses significant improve in RLE pain compared to yesterday morning before surgery.   Denies CP, SOB, N/V, numbness/tingling     Vital Signs Last 24 Hrs  T(C): 36.8 (01-11-22 @ 06:16), Max: 36.8 (01-11-22 @ 06:16)  T(F): 98.2 (01-11-22 @ 06:16), Max: 98.2 (01-11-22 @ 06:16)  HR: 87 (01-11-22 @ 06:21) (84 - 87)  BP: 129/61 (01-11-22 @ 06:21) (117/58 - 129/61)  BP(mean): --  RR: 18 (01-11-22 @ 06:21) (16 - 18)  SpO2: --  AVSS    General: Pt Alert and oriented, NAD  RLE AKA Stump DSG C/D/I  RLE sensory exam unable to perform as pt is s/p R AKA on 1/10; SILT in upper thigh region proximal to R AKA procedure   Sensation of LLE present in saph/sural/tibial/dpn/spn distribution      A/P: 66yMale POD#1 s/p R AKA with Dr Malloy  - Stable  - Pain Control  - DVT ppx: sqh   - care per primary team (vascular)  - f/u w/ dr venancio edmond outpatient regarding L knee   - vascular plans to take pt back to OR on fri 1/14 for wound closure of R AKA flap    Ortho Pager [phone]

## 2022-01-11 NOTE — PROCEDURE NOTE - NSNUMATTEMPT_GEN_A_CORE
Please let her know that we got her labs from 3/15/17 and they look good. LFTs ok, LDL still good at 37. Continue current meds.
1

## 2022-01-11 NOTE — DIETITIAN INITIAL EVALUATION ADULT. - PERTINENT MEDS FT
heparin, insulin (lantus/admelog), spironalactone, atorvastatin, furosemide, levothyroxine, pantoprazole

## 2022-01-11 NOTE — DIETITIAN INITIAL EVALUATION ADULT. - OTHER CALCULATIONS
Adjusted body weight used to calculate estimated needs since %IBW is bettwen % (116%). Pt estimated needs adjusted based on surgical incision. Fluid needs per team.

## 2022-01-11 NOTE — PROGRESS NOTE ADULT - SUBJECTIVE AND OBJECTIVE BOX
Interval Events: Reviewed  Patient seen and examined at bedside.    Patient is a 66y old  Male who presents with a chief complaint of R Knee Pain (11 Jan 2022 15:31)    he is doing better  PAST MEDICAL & SURGICAL HISTORY:  Status post percutaneous transluminal coronary angioplasty  2 stents    Atherosclerosis of coronary artery  CAD (coronary artery disease)    Osteoarthritis    HLD (hyperlipidemia)    Blood clot due to device, implant, or graft  was on blood thinners    Preoperative clearance    Diabetes  on insulin pump    HTN (hypertension)    CHF (congestive heart failure)  EF ~ 25%    History of celiac disease    Diverticulitis    STEMI (ST elevation myocardial infarction)    Diabetic neuropathy    Anxiety and depression    Other postprocedural status  Fixation hardware in foot LEFT    Stented coronary artery  10/18 heart attack  INFERIOR WALL MI    S/P CABG x 1  2018    S/P TKR (total knee replacement), right  with infection Mrsa   per pt he was cleared from MRSA infection    Surgery, elective  right knee wound debridement    History of open reduction and internal fixation (ORIF) procedure    H/O shoulder surgery  right    AICD (automatic cardioverter/defibrillator) present    Cholecystostomy care  drain inserted 2020 &amp; removed 4 months ago    History of tonsillectomy    History of hip replacement, total, right        MEDICATIONS:  Pulmonary:    Antimicrobials:    Anticoagulants:  heparin   Injectable 5000 Unit(s) SubCutaneous every 8 hours    Cardiac:  furosemide    Tablet 40 milliGRAM(s) Oral daily  spironolactone 25 milliGRAM(s) Oral daily      Allergies    ACE inhibitors (Hives)  carvedilol (Other)  enalapril (Hives)  Entresto (Other)    Intolerances        Vital Signs Last 24 Hrs  T(C): 36.7 (11 Jan 2022 14:29), Max: 36.8 (11 Jan 2022 06:16)  T(F): 98.1 (11 Jan 2022 14:29), Max: 98.2 (11 Jan 2022 06:16)  HR: 80 (11 Jan 2022 12:17) (75 - 87)  BP: 112/53 (11 Jan 2022 12:17) (109/58 - 129/61)  BP(mean): 77 (11 Jan 2022 12:17) (76 - 80)  RR: 18 (11 Jan 2022 12:17) (16 - 18)  SpO2: 98% (11 Jan 2022 12:17) (95% - 98%)    01-10 @ 07:01  -  01-11 @ 07:00  --------------------------------------------------------  IN: 180 mL / OUT: 1005 mL / NET: -825 mL    01-11 @ 07:01  - 01-11 @ 16:16  --------------------------------------------------------  IN: 0 mL / OUT: 850 mL / NET: -850 mL          Review of Systems:   •	General: negative  •	Skin/Breast: negative  •	Ophthalmologic: negative  •	ENMT: negative  •	Respiratory and Thorax: negative  •	Cardiovascular: negative  •	Gastrointestinal: negative  •	Genitourinary: negative  •	Musculoskeletal: negative  •	Neurological: negative  •	Psychiatric: negative  •	Hematology/Lymphatics: negative  •	Endocrine: negative  •	Allergic/Immunologic: negative    Physical Exam:   • Constitutional:	refer to the dietion /Nutritionist note  • Eyes:	EOMI; PERRL; no drainage or redness  • ENMT:	No oral lesions; no gross abnormalities  • Neck	No bruits; no thyromegaly or nodules  • Breasts:	not examined  • Back:	No deformity or limitation of movement  • Respiratory:	Breath Sounds equal & clear to percussion & auscultation, no accessory muscle use  • Cardiovascular:	Regular rate & rhythm, normal S1, S2; no murmurs, gallops or rubs; no S3, S4  • Gastrointestinal:	Soft, non-tender, no hepatosplenomegaly, normal bowel sounds  • Genitourinary:	not examined  • Rectal: not examined  • Extremities:	No cyanosis, clubbing or edema  • Vascular:	Equal and normal pulses (carotid, femoral, dorsalis pedis)  • Neurologica:l	not examined  • Skin:	No lesions; no rash  • Lymph Nodes:	No lymphadedenopathy  • Musculoskeletal:	No joint pain, swelling or deformity; no limitation of movement        LABS:      CBC Full  -  ( 10 Kris 2022 23:05 )  WBC Count : 14.91 K/uL  RBC Count : 3.77 M/uL  Hemoglobin : 9.2 g/dL  Hematocrit : 29.7 %  Platelet Count - Automated : 274 K/uL  Mean Cell Volume : 78.8 fl  Mean Cell Hemoglobin : 24.4 pg  Mean Cell Hemoglobin Concentration : 31.0 gm/dL  Auto Neutrophil # : x  Auto Lymphocyte # : x  Auto Monocyte # : x  Auto Eosinophil # : x  Auto Basophil # : x  Auto Neutrophil % : x  Auto Lymphocyte % : x  Auto Monocyte % : x  Auto Eosinophil % : x  Auto Basophil % : x    01-10    132<L>  |  94<L>  |  17  ----------------------------<  194<H>  4.6   |  28  |  1.15    Ca    8.0<L>      10 Kris 2022 23:05  Phos  4.0     01-10  Mg     1.4     01-10      PT/INR - ( 10 Kris 2022 23:05 )   PT: 15.1 sec;   INR: 1.27          PTT - ( 10 Kris 2022 23:05 )  PTT:25.7 sec                    RADIOLOGY & ADDITIONAL STUDIES (The following images were personally reviewed):

## 2022-01-12 LAB
ANION GAP SERPL CALC-SCNC: 12 MMOL/L — SIGNIFICANT CHANGE UP (ref 5–17)
BUN SERPL-MCNC: 14 MG/DL — SIGNIFICANT CHANGE UP (ref 7–23)
CALCIUM SERPL-MCNC: 8.2 MG/DL — LOW (ref 8.4–10.5)
CHLORIDE SERPL-SCNC: 92 MMOL/L — LOW (ref 96–108)
CO2 SERPL-SCNC: 30 MMOL/L — SIGNIFICANT CHANGE UP (ref 22–31)
CREAT SERPL-MCNC: 1.14 MG/DL — SIGNIFICANT CHANGE UP (ref 0.5–1.3)
CULTURE RESULTS: SIGNIFICANT CHANGE UP
GLUCOSE BLDC GLUCOMTR-MCNC: 103 MG/DL — HIGH (ref 70–99)
GLUCOSE BLDC GLUCOMTR-MCNC: 118 MG/DL — HIGH (ref 70–99)
GLUCOSE BLDC GLUCOMTR-MCNC: 176 MG/DL — HIGH (ref 70–99)
GLUCOSE BLDC GLUCOMTR-MCNC: 233 MG/DL — HIGH (ref 70–99)
GLUCOSE BLDC GLUCOMTR-MCNC: 241 MG/DL — HIGH (ref 70–99)
GLUCOSE BLDC GLUCOMTR-MCNC: 246 MG/DL — HIGH (ref 70–99)
GLUCOSE SERPL-MCNC: 204 MG/DL — HIGH (ref 70–99)
MAGNESIUM SERPL-MCNC: 1.7 MG/DL — SIGNIFICANT CHANGE UP (ref 1.6–2.6)
PHOSPHATE SERPL-MCNC: 3.3 MG/DL — SIGNIFICANT CHANGE UP (ref 2.5–4.5)
POTASSIUM SERPL-MCNC: 3.7 MMOL/L — SIGNIFICANT CHANGE UP (ref 3.5–5.3)
POTASSIUM SERPL-SCNC: 3.7 MMOL/L — SIGNIFICANT CHANGE UP (ref 3.5–5.3)
SODIUM SERPL-SCNC: 134 MMOL/L — LOW (ref 135–145)
SPECIMEN SOURCE: SIGNIFICANT CHANGE UP
VANCOMYCIN FLD-MCNC: 12.3 UG/ML — SIGNIFICANT CHANGE UP

## 2022-01-12 PROCEDURE — 76376 3D RENDER W/INTRP POSTPROCES: CPT | Mod: 26

## 2022-01-12 PROCEDURE — 99231 SBSQ HOSP IP/OBS SF/LOW 25: CPT | Mod: GC

## 2022-01-12 PROCEDURE — 99232 SBSQ HOSP IP/OBS MODERATE 35: CPT | Mod: GC

## 2022-01-12 PROCEDURE — 99232 SBSQ HOSP IP/OBS MODERATE 35: CPT

## 2022-01-12 PROCEDURE — 93312 ECHO TRANSESOPHAGEAL: CPT | Mod: 26

## 2022-01-12 RX ORDER — HYDROMORPHONE HYDROCHLORIDE 2 MG/ML
0.5 INJECTION INTRAMUSCULAR; INTRAVENOUS; SUBCUTANEOUS EVERY 4 HOURS
Refills: 0 | Status: DISCONTINUED | OUTPATIENT
Start: 2022-01-12 | End: 2022-01-14

## 2022-01-12 RX ORDER — VANCOMYCIN HCL 1 G
1000 VIAL (EA) INTRAVENOUS EVERY 24 HOURS
Refills: 0 | Status: DISCONTINUED | OUTPATIENT
Start: 2022-01-12 | End: 2022-01-12

## 2022-01-12 RX ORDER — VANCOMYCIN HCL 1 G
1250 VIAL (EA) INTRAVENOUS EVERY 24 HOURS
Refills: 0 | Status: DISCONTINUED | OUTPATIENT
Start: 2022-01-13 | End: 2022-01-13

## 2022-01-12 RX ORDER — HYDROMORPHONE HYDROCHLORIDE 2 MG/ML
0.5 INJECTION INTRAMUSCULAR; INTRAVENOUS; SUBCUTANEOUS ONCE
Refills: 0 | Status: DISCONTINUED | OUTPATIENT
Start: 2022-01-12 | End: 2022-01-12

## 2022-01-12 RX ORDER — MAGNESIUM SULFATE 500 MG/ML
1 VIAL (ML) INJECTION ONCE
Refills: 0 | Status: COMPLETED | OUTPATIENT
Start: 2022-01-12 | End: 2022-01-12

## 2022-01-12 RX ORDER — OXYCODONE HYDROCHLORIDE 5 MG/1
10 TABLET ORAL EVERY 6 HOURS
Refills: 0 | Status: DISCONTINUED | OUTPATIENT
Start: 2022-01-12 | End: 2022-01-14

## 2022-01-12 RX ORDER — INSULIN LISPRO 100/ML
9 VIAL (ML) SUBCUTANEOUS
Refills: 0 | Status: DISCONTINUED | OUTPATIENT
Start: 2022-01-12 | End: 2022-01-18

## 2022-01-12 RX ORDER — INSULIN GLARGINE 100 [IU]/ML
27 INJECTION, SOLUTION SUBCUTANEOUS AT BEDTIME
Refills: 0 | Status: DISCONTINUED | OUTPATIENT
Start: 2022-01-12 | End: 2022-01-13

## 2022-01-12 RX ORDER — INSULIN LISPRO 100/ML
VIAL (ML) SUBCUTANEOUS
Refills: 0 | Status: DISCONTINUED | OUTPATIENT
Start: 2022-01-12 | End: 2022-01-27

## 2022-01-12 RX ORDER — MAGNESIUM SULFATE 500 MG/ML
2 VIAL (ML) INJECTION ONCE
Refills: 0 | Status: COMPLETED | OUTPATIENT
Start: 2022-01-12 | End: 2022-01-12

## 2022-01-12 RX ORDER — HYDROMORPHONE HYDROCHLORIDE 2 MG/ML
0.5 INJECTION INTRAMUSCULAR; INTRAVENOUS; SUBCUTANEOUS ONCE
Refills: 0 | Status: DISCONTINUED | OUTPATIENT
Start: 2022-01-13 | End: 2022-01-13

## 2022-01-12 RX ADMIN — OXYCODONE HYDROCHLORIDE 5 MILLIGRAM(S): 5 TABLET ORAL at 15:56

## 2022-01-12 RX ADMIN — HYDROMORPHONE HYDROCHLORIDE 0.5 MILLIGRAM(S): 2 INJECTION INTRAMUSCULAR; INTRAVENOUS; SUBCUTANEOUS at 07:16

## 2022-01-12 RX ADMIN — HYDROMORPHONE HYDROCHLORIDE 0.5 MILLIGRAM(S): 2 INJECTION INTRAMUSCULAR; INTRAVENOUS; SUBCUTANEOUS at 22:26

## 2022-01-12 RX ADMIN — Medication 250 MILLIGRAM(S): at 00:54

## 2022-01-12 RX ADMIN — GABAPENTIN 100 MILLIGRAM(S): 400 CAPSULE ORAL at 13:48

## 2022-01-12 RX ADMIN — ATORVASTATIN CALCIUM 80 MILLIGRAM(S): 80 TABLET, FILM COATED ORAL at 21:43

## 2022-01-12 RX ADMIN — Medication 2: at 06:34

## 2022-01-12 RX ADMIN — HYDROMORPHONE HYDROCHLORIDE 1 MILLIGRAM(S): 2 INJECTION INTRAMUSCULAR; INTRAVENOUS; SUBCUTANEOUS at 05:38

## 2022-01-12 RX ADMIN — Medication 7 UNIT(S): at 12:53

## 2022-01-12 RX ADMIN — OXYCODONE HYDROCHLORIDE 5 MILLIGRAM(S): 5 TABLET ORAL at 11:54

## 2022-01-12 RX ADMIN — INSULIN GLARGINE 27 UNIT(S): 100 INJECTION, SOLUTION SUBCUTANEOUS at 21:43

## 2022-01-12 RX ADMIN — OXYCODONE HYDROCHLORIDE 5 MILLIGRAM(S): 5 TABLET ORAL at 15:03

## 2022-01-12 RX ADMIN — Medication 4: at 21:43

## 2022-01-12 RX ADMIN — GABAPENTIN 100 MILLIGRAM(S): 400 CAPSULE ORAL at 21:43

## 2022-01-12 RX ADMIN — OXYCODONE HYDROCHLORIDE 5 MILLIGRAM(S): 5 TABLET ORAL at 10:55

## 2022-01-12 RX ADMIN — Medication 25 GRAM(S): at 10:56

## 2022-01-12 RX ADMIN — GABAPENTIN 100 MILLIGRAM(S): 400 CAPSULE ORAL at 06:31

## 2022-01-12 RX ADMIN — HEPARIN SODIUM 5000 UNIT(S): 5000 INJECTION INTRAVENOUS; SUBCUTANEOUS at 21:43

## 2022-01-12 RX ADMIN — PANTOPRAZOLE SODIUM 40 MILLIGRAM(S): 20 TABLET, DELAYED RELEASE ORAL at 06:32

## 2022-01-12 RX ADMIN — HEPARIN SODIUM 5000 UNIT(S): 5000 INJECTION INTRAVENOUS; SUBCUTANEOUS at 06:33

## 2022-01-12 RX ADMIN — CHLORHEXIDINE GLUCONATE 1 APPLICATION(S): 213 SOLUTION TOPICAL at 06:37

## 2022-01-12 RX ADMIN — SPIRONOLACTONE 25 MILLIGRAM(S): 25 TABLET, FILM COATED ORAL at 06:32

## 2022-01-12 RX ADMIN — HYDROMORPHONE HYDROCHLORIDE 0.5 MILLIGRAM(S): 2 INJECTION INTRAMUSCULAR; INTRAVENOUS; SUBCUTANEOUS at 22:56

## 2022-01-12 RX ADMIN — HEPARIN SODIUM 5000 UNIT(S): 5000 INJECTION INTRAVENOUS; SUBCUTANEOUS at 13:48

## 2022-01-12 RX ADMIN — Medication 100 GRAM(S): at 00:55

## 2022-01-12 RX ADMIN — DULOXETINE HYDROCHLORIDE 20 MILLIGRAM(S): 30 CAPSULE, DELAYED RELEASE ORAL at 12:53

## 2022-01-12 RX ADMIN — Medication 4: at 17:16

## 2022-01-12 RX ADMIN — HYDROMORPHONE HYDROCHLORIDE 0.5 MILLIGRAM(S): 2 INJECTION INTRAMUSCULAR; INTRAVENOUS; SUBCUTANEOUS at 06:48

## 2022-01-12 RX ADMIN — Medication 4: at 01:36

## 2022-01-12 RX ADMIN — Medication 40 MILLIGRAM(S): at 06:31

## 2022-01-12 RX ADMIN — Medication 166.67 MILLIGRAM(S): at 23:06

## 2022-01-12 RX ADMIN — HYDROMORPHONE HYDROCHLORIDE 1 MILLIGRAM(S): 2 INJECTION INTRAMUSCULAR; INTRAVENOUS; SUBCUTANEOUS at 06:00

## 2022-01-12 RX ADMIN — Medication 25 MICROGRAM(S): at 06:32

## 2022-01-12 NOTE — OCCUPATIONAL THERAPY INITIAL EVALUATION ADULT - PERTINENT HX OF CURRENT PROBLEM, REHAB EVAL
65yo M hx of CAD, CHF (EF 20-25%), DM, R TKA several years ago c/b recurrent PJI and revisions, now s/p guillotine R AKA 1/10/22.

## 2022-01-12 NOTE — OCCUPATIONAL THERAPY INITIAL EVALUATION ADULT - GENERAL OBSERVATIONS, REHAB EVAL
Pt cleared for OT by NICANOR Montesinos. Pt received semi-richey, NAD, +RLE ace wrap c/d/i, +tele, +PICC.

## 2022-01-12 NOTE — PROGRESS NOTE ADULT - SUBJECTIVE AND OBJECTIVE BOX
Interval Events: Reviewed  Patient seen and examined at bedside.    Patient is a 66y old  Male who presents with a chief complaint of R Knee Pain (12 Jan 2022 18:10)  he is doing well    PAST MEDICAL & SURGICAL HISTORY:  Status post percutaneous transluminal coronary angioplasty  2 stents    Atherosclerosis of coronary artery  CAD (coronary artery disease)    Osteoarthritis    HLD (hyperlipidemia)    Blood clot due to device, implant, or graft  was on blood thinners    Diabetes  on insulin pump    HTN (hypertension)    CHF (congestive heart failure)  EF ~ 25%    History of celiac disease    Diverticulitis    STEMI (ST elevation myocardial infarction)    Diabetic neuropathy    Anxiety and depression    Preoperative clearance    Other postprocedural status  Fixation hardware in foot LEFT    Stented coronary artery  10/18 heart attack  INFERIOR WALL MI    S/P CABG x 1  2018    S/P TKR (total knee replacement), right  with infection Mrsa   per pt he was cleared from MRSA infection    Surgery, elective  right knee wound debridement    History of open reduction and internal fixation (ORIF) procedure    H/O shoulder surgery  right    AICD (automatic cardioverter/defibrillator) present    Cholecystostomy care  drain inserted 2020 &amp; removed 4 months ago    History of tonsillectomy    History of hip replacement, total, right        MEDICATIONS:  Pulmonary:    Antimicrobials:    Anticoagulants:  heparin   Injectable 5000 Unit(s) SubCutaneous every 8 hours    Cardiac:  furosemide    Tablet 40 milliGRAM(s) Oral daily  spironolactone 25 milliGRAM(s) Oral daily      Allergies    ACE inhibitors (Hives)  carvedilol (Other)  enalapril (Hives)  Entresto (Other)    Intolerances        Vital Signs Last 24 Hrs  T(C): 36.9 (12 Jan 2022 18:59), Max: 37.3 (12 Jan 2022 14:00)  T(F): 98.5 (12 Jan 2022 18:59), Max: 99.1 (12 Jan 2022 14:00)  HR: 79 (12 Jan 2022 17:18) (75 - 92)  BP: 92/52 (12 Jan 2022 17:18) (91/43 - 123/56)  BP(mean): 69 (12 Jan 2022 17:18) (69 - 69)  RR: 16 (12 Jan 2022 17:18) (16 - 20)  SpO2: 97% (12 Jan 2022 17:18) (95% - 98%)    01-11 @ 07:01  -  01-12 @ 07:00  --------------------------------------------------------  IN: 180 mL / OUT: 1150 mL / NET: -970 mL    01-12 @ 07:01  - 01-12 @ 20:42  --------------------------------------------------------  IN: 720 mL / OUT: 200 mL / NET: 520 mL          Review of Systems:   •	General: negative  •	Skin/Breast: negative  •	Ophthalmologic: negative  •	ENMT: negative  •	Respiratory and Thorax: negative  •	Cardiovascular: negative  •	Gastrointestinal: negative  •	Genitourinary: negative  •	Musculoskeletal: negative  •	Neurological: negative  •	Psychiatric: negative  •	Hematology/Lymphatics: negative  •	Endocrine: negative  •	Allergic/Immunologic: negative    Physical Exam:   • Constitutional:	refer to the dietion /Nutritionist note  • Eyes:	EOMI; PERRL; no drainage or redness  • ENMT:	No oral lesions; no gross abnormalities  • Neck	No bruits; no thyromegaly or nodules  • Breasts:	not examined  • Back:	No deformity or limitation of movement  • Respiratory:	Breath Sounds equal & clear to percussion & auscultation, no accessory muscle use  • Cardiovascular:	Regular rate & rhythm, normal S1, S2; no murmurs, gallops or rubs; no S3, S4  • Gastrointestinal:	Soft, non-tender, no hepatosplenomegaly, normal bowel sounds  • Genitourinary:	not examined  • Rectal: not examined  • Extremities:	No cyanosis, clubbing or edema  • Vascular:	Equal and normal pulses (carotid, femoral, dorsalis pedis)  • Neurologica:l	not examined  • Skin:	No lesions; no rash  • Lymph Nodes:	No lymphadedenopathy  • Musculoskeletal:	No joint pain, swelling or deformity; no limitation of movement        LABS:      CBC Full  -  ( 11 Jan 2022 23:36 )  WBC Count : 13.52 K/uL  RBC Count : 3.35 M/uL  Hemoglobin : 8.5 g/dL  Hematocrit : 26.5 %  Platelet Count - Automated : 294 K/uL  Mean Cell Volume : 79.1 fl  Mean Cell Hemoglobin : 25.4 pg  Mean Cell Hemoglobin Concentration : 32.1 gm/dL  Auto Neutrophil # : x  Auto Lymphocyte # : x  Auto Monocyte # : x  Auto Eosinophil # : x  Auto Basophil # : x  Auto Neutrophil % : x  Auto Lymphocyte % : x  Auto Monocyte % : x  Auto Eosinophil % : x  Auto Basophil % : x    01-11    134<L>  |  92<L>  |  14  ----------------------------<  204<H>  3.7   |  30  |  1.14    Ca    8.2<L>      11 Jan 2022 23:37  Phos  3.3     01-11  Mg     1.7     01-11      PT/INR - ( 10 Kris 2022 23:05 )   PT: 15.1 sec;   INR: 1.27          PTT - ( 10 Kris 2022 23:05 )  PTT:25.7 sec                    RADIOLOGY & ADDITIONAL STUDIES (The following images were personally reviewed):

## 2022-01-12 NOTE — PHYSICAL THERAPY INITIAL EVALUATION ADULT - RANGE OF MOTION EXAMINATION, REHAB EVAL
Patient: Kristen Singh Date: 2017   : 1935 Attending: Pascale Griffith MD   82 year old female      Chief Complaint   Patient presents with   • Dizziness   • Urinary Retention      HPI: Kristen Robertson is a 82 year old female who presents today for ckd 5 and arf. Didn't feel well past few days.occ dizziness, chest discomfort, sob, decreased uo. Missed some meds. Came to er, bp very elevlated. Given iv meds, bp better now 140's. Given fluids, creat fell. currently no n/v/f/c/sob/cp/gu sx/eedema. Cardiology also following. Eating ok, mentating well per daughters    Past Medical History:   Diagnosis Date   • Anemia     secondary to CKD    • Arthritis    • Chronic kidney disease    • Essential (primary) hypertension    • Gastroesophageal reflux disease    • High cholesterol    • Pneumonia    • Thyroid condition     hyperparathyroidism     • Thyroid nodule    • Urinary tract infection      Past Surgical History:   Procedure Laterality Date   • JOINT REPLACEMENT Right     hip   • REMOVAL GALLBLADDER     • SERVICE TO GASTROENTEROLOGY  2013    EGD    • TOTAL HIP REPLACEMENT Right 2017     Social History     Social History   • Marital status:      Spouse name: N/A   • Number of children: N/A   • Years of education: N/A     Occupational History   • Not on file.     Social History Main Topics   • Smoking status: Former Smoker     Packs/day: 0.25     Years: 58.00     Types: Cigarettes     Quit date: 2015   • Smokeless tobacco: Never Used   • Alcohol use No   • Drug use: No   • Sexual activity: Not on file     Other Topics Concern   • Not on file     Social History Narrative   • No narrative on file     Family History   Problem Relation Age of Onset   • Cancer Mother    • Cancer Father    • Cancer Sister    • Diabetes Sister    • Cancer Brother    • Diabetes Brother    • Stroke Brother    • Thyroid Sister    • Thyroid Brother    • Diabetes Brother    • Hypertension Son    • Stroke Maternal  Grandmother      ALLERGIES:   Allergen Reactions   • Penicillins SWELLING   • Nsaids Other (See Comments)     Due to Chronic Kidney Disease   • Sulfa Antibiotics SWELLING   • Tape [Adhesive] Dermatitis       Medications:  Current Facility-Administered Medications   Medication Dose Route Frequency Provider Last Rate Last Dose   • prazosin (MINIPRESS) capsule 5 mg  5 mg Oral 2 times per day Pascale Griffith MD   5 mg at 08/01/17 1247   • ondansetron (ZOFRAN) injection 4 mg  4 mg Intravenous BID PRN Pascale Griffith MD   4 mg at 08/01/17 0829   • [START ON 8/2/2017] dilTIAZem (CARDIZEM CD) 24 hr capsule 180 mg  180 mg Oral Daily Jaiden Quiñones MD       • sodium chloride 0.9% infusion   Intravenous Continuous PETE Edwards 75 mL/hr at 08/01/17 1712     • allopurinol (ZYLOPRIM) tablet 100 mg  100 mg Oral Daily Pascale Griffith MD   100 mg at 08/01/17 0942   • bumetanide (BUMEX) tablet 1 mg  1 mg Oral BID Pascale Griffith MD   1 mg at 08/01/17 1712   • calcitRIOL (ROCALTROL) capsule 0.25 mcg  0.25 mcg Oral Once per day on Mon Pascale Griffith MD   0.25 mcg at 08/01/17 0002   • ferrous sulfate tablet 325 mg  325 mg Oral Daily with breakfast Pascale Griffith MD   325 mg at 08/01/17 0942   • acetaminophen (TYLENOL) tablet 1,000 mg  1,000 mg Oral Q6H PRN Pascale Griffith MD       • clopidogrel (PLAVIX) tablet 75 mg  75 mg Oral Daily Pascale Griffith MD   75 mg at 08/01/17 0942   • cyanocobalamin (Vitamin B-12) tablet 1,000 mcg  1,000 mcg Oral Daily Pascale Griffith MD   1,000 mcg at 08/01/17 0943   • pantoprazole (PROTONIX) EC tablet 40 mg  40 mg Oral QAM AC Pascale Griffith MD   40 mg at 08/01/17 0942   • metoPROLOL (TOPROL-XL) ER tablet 100 mg  100 mg Oral Daily Pascale Griffith MD       • sodium chloride (PF) 0.9 % injection 2 mL  2 mL Injection 2 times per day Pascale Griffith MD       • sodium chloride (PF) 0.9 % injection 2 mL  2 mL Injection PRN Pascale Griffith MD       • enoxaparin (LOVENOX)  injection 30 mg  30 mg Subcutaneous Q24H Pascale Griffith MD   30 mg at 08/01/17 0946   • nitroPRUSSide (NIPRIDE) 50 mg in sodium chloride 0.9 % 250 mL infusion   Intravenous Continuous Pascale Griffith MD   Stopped at 08/01/17 0558       Immunization Status:  Immunization History   Administered Date(s) Administered   • Pneumococcal Polysaccharide Adult 09/11/2013     Immunization History   Administered Date(s) Administered   • Pneumococcal Polysaccharide Adult 09/11/2013       ROS: All systems reviewed and negative except for what is mentioned in the HPI.    Vitals 8/1/2017 8/1/2017 8/1/2017 8/1/2017 8/1/2017 8/1/2017 8/1/2017   SYSTOLIC 132 120 145 142 139 140 142   DIASTOLIC 53 52 71 65 54 52 61   Pulse - 60 62 58 54 53 48   Temp - - - - - 98.2 -   Resp - 25 - - - 20 -   Weight kg - - - - - - -   Height - - 5' 2\" - - - -   BMI (Calculated) - - - - - - -     Physical Exam:    GENERAL: Alert oriented x 3, Not in respiratory distress, Appropriate mood and affect  HEENT: Normocephalic atraumatic, No pallor, No icterus, Oral mucous membrane moist, No lesions  NECK: Supple, No JVD, No cervical and no supraclavicular lymphadenopathy, No goiter, No mass,Trachea midline. wiliam bruites  CHEST: Symmetric air entry, Clear to ausculation bilaterally  HEART:bradycardia,  S1, S2, No rub  ABDOMEN: Soft, Non-tender, No distension, No hepatosplenomegaly, BS present, No CVA tenderness  EXTREMITIES: No cyanosis, No clubbing, No edema. Good bruit left avf  NEUROMUSCULAR: Moves all extremities, Grossly intact  SKIN: No rash, No lesions, No nodules    Laboratory Results:  Normal left ventricular size and systolic function.  Grade I/IV diastolic dysfunction (abnormal relaxation filling pattern), normal to mildly elevated filling pressures.  Mild pulmonary hypertension, RVSP 38.2 mmHg.  Very mild \"doming\" of the anterior mitral leaflet is noted but the mean transvalvular gradient is less than 3 mmHg.  Mild-to-moderate mitral valve  regurgitation.  Small pericardial effusion.  Normal IVC dimension with >50% respiratory change of the inferior vena cava.  The heart is moderately enlarged. The mediastinal structures are  unremarkable.           The pulmonary vasculature is within normal limits.          The lungs are clear.           No effusions or pneumothoraces are present.     The bony thorax is unremarkable.            IMPRESSION:  Normal chest..      Recent Labs      08/23/16   1354   12/01/16   1050   01/05/17   0520   02/01/17   1131  04/11/17   1316  06/13/17   1504  07/31/17   1922  08/01/17   0514   SODIUM  135   < >  139   < >  140   < >  142  140  139  138  141   POTASSIUM  5.0   < >  4.7   < >  5.5*   < >  4.6  4.4  5.3*  3.7  3.5   CHLORIDE  102   < >  102   < >  107   < >  104  102  103  99  104   CO2  26   < >  27   < >  23   < >  27  25  27  28  28   ANIONGAP  12   < >  15   < >  16   < >  16  17  14  15  13   BUN  49*   < >  65*   < >  51*   < >  48*  65*  60*  83*  77*   CREATININE  2.84*   < >  3.04*   < >  2.65*   < >  3.08*  3.24*  3.27*  4.60*  4.06*   GFRA  18   < >  16   < >  19   < >  16  15  15  10  11   GFRNA  15   < >  14   < >  16   < >  14  13  13  8  10   GLUCOSE  95   < >  89   < >  190*   < >  95  100*  98  112*  97   CALCIUM  8.9   < >  9.4   < >  8.3*   < >  9.7  8.6  9.7  9.3  8.9   MG   --    --    --    --    --    --    --    --    --    --   2.7*   PHOS  4.2   < >   --    --    --    --   4.6  4.5  5.1*   --    --    INTAC  152*   < >  155*   --    --    --    --   94*  127*   --    --    VITD25  63.0   --    --    --    --    --    --   73.2   --    --    --    ALBUMIN  3.7   < >   --    < >  3.1*   --   3.6   --    --   3.5*   --     < > = values in this interval not displayed.         Recent Labs      04/11/17   1316  06/13/17   1504  07/31/17   1922  08/01/17   0514   WBC  6.0  8.3  7.9  7.0   HGB  9.7*  10.0*  8.6*  7.5*   HCT  30.9*  31.1*  26.9*  24.5*   PLT  337  332  331  287   FERR  43  54   --     --    PST  9*  16   --    --      Urine Panel  Recent Labs      12/28/16   1100  07/31/17 2057   USPG  1.015  1.010   UPH  6.0  7.0   UPROT  TRACE*  TRACE*   UROB  0.2  0.2   UNITR  NEGATIVE  NEGATIVE   UKET  NEGATIVE  NEGATIVE   UBACTR  MODERATE*  FEW*   UBILI  NEGATIVE  NEGATIVE   UWBC  MODERATE*  SMALL*   URBC  NEGATIVE  NEGATIVE     Diagnosis/Plan:    · CKD: Stage 5/arf. Baseline creat around 3. avf in over a year, mature. arf from vol depletion, htn. Ok urine output, creat falling. Unsure if she will need hd this admit. Start if n/v/sob. currrently clinically better  · Hypertension:  Much better, 140's, on mult meds. Since she has carotid disease, keep at this level  · Secondary Hyperparathyroidism:  On rocaltrol  · Anemia:  hgb low. Check iron stores, give iv if low. If good, start epogen  · Electrolyte/Acid Base:  Good. Check phosphorus  · Volume Status:  Good. No rales or edema.     Continue gentle ivf, decide on hd in am. Discussed with family. All questions answered     left knee flex ~ 80 degrees, 5 degree extension deficit

## 2022-01-12 NOTE — PROGRESS NOTE ADULT - SUBJECTIVE AND OBJECTIVE BOX
INTERVAL HPI/OVERNIGHT EVENTS:      Patient is a 66y old  Male who presents with a chief complaint of R Knee Pain (10 Kris 2022 08:32)  s/p R AKA 1/10/2022.  Intermittent pain. Good appetite when not NPO for testing.    FSG & insulin:  Yesterday -   Dinner . Lispro 7+2. Off floor for dinner. Ate keren crackers.  Bedtime . Lantus 22.  today -   1Am . Lispro 4  Breakfast . Lispro 2. NPO  Lunch     Pt reports the following symptoms:    CONSTITUTIONAL:  Negative fever or chills, feels well, good appetite  EYES:  Negative  blurry vision or double vision  CARDIOVASCULAR:  Negative for chest pain or palpitations  RESPIRATORY:  Negative for cough, wheezing, or SOB   GASTROINTESTINAL:  Negative for nausea, vomiting, diarrhea, constipation, or abdominal pain  GENITOURINARY:  Negative frequency, urgency or dysuria  NEUROLOGIC:  No headache, confusion, dizziness, lightheadedness      MEDICATIONS  (STANDING):  atorvastatin 80 milliGRAM(s) Oral at bedtime  chlorhexidine 2% Cloths 1 Application(s) Topical <User Schedule>  dextrose 40% Gel 15 Gram(s) Oral once  dextrose 5%. 1000 milliLiter(s) (50 mL/Hr) IV Continuous <Continuous>  dextrose 5%. 1000 milliLiter(s) (100 mL/Hr) IV Continuous <Continuous>  dextrose 50% Injectable 25 Gram(s) IV Push once  dextrose 50% Injectable 12.5 Gram(s) IV Push once  dextrose 50% Injectable 25 Gram(s) IV Push once  DULoxetine 20 milliGRAM(s) Oral daily  furosemide    Tablet 40 milliGRAM(s) Oral daily  gabapentin 100 milliGRAM(s) Oral three times a day  glucagon  Injectable 1 milliGRAM(s) IntraMuscular once  heparin   Injectable 5000 Unit(s) SubCutaneous every 8 hours  insulin glargine Injectable (LANTUS) 22 Unit(s) SubCutaneous at bedtime  insulin lispro (ADMELOG) corrective regimen sliding scale   SubCutaneous every 6 hours  insulin lispro Injectable (ADMELOG) 7 Unit(s) SubCutaneous three times a day before meals  levothyroxine 25 MICROGram(s) Oral daily  pantoprazole    Tablet 40 milliGRAM(s) Oral before breakfast  povidone iodine 5% Nasal Swab 1 Application(s) Both Nostrils once  spironolactone 25 milliGRAM(s) Oral daily    MEDICATIONS  (PRN):  acetaminophen     Tablet .. 650 milliGRAM(s) Oral every 6 hours PRN Mild Pain (1 - 3)  ALPRAZolam 0.5 milliGRAM(s) Oral every 8 hours PRN anxiety  aluminum hydroxide/magnesium hydroxide/simethicone Suspension 30 milliLiter(s) Oral every 4 hours PRN Dyspepsia  cyclobenzaprine 5 milliGRAM(s) Oral three times a day PRN Muscle Spasm  oxyCODONE    IR 5 milliGRAM(s) Oral every 4 hours PRN Moderate Pain (4 - 6)  oxyCODONE    IR 10 milliGRAM(s) Oral once PRN Severe Pain (7 - 10)  sodium chloride 0.9% lock flush 10 milliLiter(s) IV Push every 1 hour PRN Pre/post blood products, medications, blood draw, and to maintain line patency      PHYSICAL EXAM  Vital Signs Last 24 Hrs  T(C): 36.7 (11 Jan 2022 14:29), Max: 36.8 (11 Jan 2022 06:16)  T(F): 98.1 (11 Jan 2022 14:29), Max: 98.2 (11 Jan 2022 06:16)  HR: 80 (11 Jan 2022 12:17) (75 - 87)  BP: 112/53 (11 Jan 2022 12:17) (109/58 - 129/61)  BP(mean): 77 (11 Jan 2022 12:17) (76 - 80)  RR: 18 (11 Jan 2022 12:17) (16 - 18)  SpO2: 98% (11 Jan 2022 12:17) (95% - 98%)    Constitutional: NAD.   HEENT: NCAT, MMM, OP clear, EOMI, , no proptosis or lid retraction  Neck: no thyromegaly or palpable thyroid nodules   Respiratory: lungs CTAB.  Cardiovascular: regular rhythm, normal S1 and S2, no audible murmurs  GI: soft, NT/ND, no masses/HSM appreciated.  Neurology: no tremors  Skin: no visible rashes/lesions, R AKA   Psychiatric: AAO x 3, normal affect/mood.    LABS:                        9.2    14.91 )-----------( 274      ( 10 Kris 2022 23:05 )             29.7     01-10    132<L>  |  94<L>  |  17  ----------------------------<  194<H>  4.6   |  28  |  1.15    Ca    8.0<L>      10 Kris 2022 23:05  Phos  4.0     01-10  Mg     1.4     01-10      PT/INR - ( 10 Kris 2022 23:05 )   PT: 15.1 sec;   INR: 1.27          PTT - ( 10 Kris 2022 23:05 )  PTT:25.7 sec    Thyroid Stimulating Hormone, Serum: 2.850 uIU/mL (01-08 @ 08:25)  Thyroid Stimulating Hormone, Serum: 1.68 uIU/mL (03-12 @ 17:00)      HbA1C:   CAPILLARY BLOOD GLUCOSE      POCT Blood Glucose.: 185 mg/dL (11 Jan 2022 12:34)  POCT Blood Glucose.: 188 mg/dL (11 Jan 2022 09:52)  POCT Blood Glucose.: 201 mg/dL (11 Jan 2022 06:34)  POCT Blood Glucose.: 184 mg/dL (11 Jan 2022 00:18)  POCT Blood Glucose.: 166 mg/dL (10 Kris 2022 21:55)  POCT Blood Glucose.: 238 mg/dL (10 Kris 2022 16:35)

## 2022-01-12 NOTE — OCCUPATIONAL THERAPY INITIAL EVALUATION ADULT - LIVES WITH, PROFILE
Lives in private home with 6 steps to enter. Pt resides on first floor and relies on ambulette to carry him in/out of house for appointments./spouse

## 2022-01-12 NOTE — PHYSICAL THERAPY INITIAL EVALUATION ADULT - GENERAL OBSERVATIONS, REHAB EVAL
patient received supine with no acute distress. +EKG +IV +right LE residual limb ace wrap clean/dry/intact

## 2022-01-12 NOTE — PHYSICAL THERAPY INITIAL EVALUATION ADULT - PERTINENT HX OF CURRENT PROBLEM, REHAB EVAL
66 year old male s/p R TKA several years ago c/b recurrent PJI and revisions, now s/p guillotine R AKA 1/10/22.

## 2022-01-12 NOTE — PROGRESS NOTE ADULT - ASSESSMENT
66M hx of CAD with CABG , CHF (EF 20-25%) with AICD, T2DM c/b neuropathy with hx of diabetic ulcer, HLD. HTN, COPD, diverticulitis, celiac disease, anxiety and depression, cholecystostomy, R hip replacement 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 Castro (explant and abx spacer exchange), transferred here from [facility] for recurrent PJI for repeat spacer exchange and possible flap coverage with Dr. Vaughn/Dr. Bowen. Pt has been experiencing atraumatic worsening R knee pain x 1 month. Notes he went to a procedure center where his knee was aspirated 3 weeks ago. Pt was seen at [facility] yesterday where his knee was aspirated with 283k cell count. WBC 14, ESR 78, +, AVSS. Pt started on vancomycin + rifampin. 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 Now s/p Replacement, antibiotic spacer 06-Jan-2022. Also with hyperglycemia.  now s/p R AKA on vascular service     A1C: 9.0%  Weight: 97kg BMI 28  Cr/GFR: 0.9/104  EF: 20-25%

## 2022-01-12 NOTE — OCCUPATIONAL THERAPY INITIAL EVALUATION ADULT - PLANNED THERAPY INTERVENTIONS, OT EVAL
ADL retraining/IADL retraining/balance training/bed mobility training/fine motor coordination training/motor coordination training/ROM/strengthening/transfer training

## 2022-01-12 NOTE — PROGRESS NOTE ADULT - SUBJECTIVE AND OBJECTIVE BOX
O/N: vancio trough - 12.3 - 1g q24h ordered per pharmacy, labs WNL  1/11: PICC placed. Per ID: will need anx for 4-6 weeks pending JOHN PAUL. JOHN PAUL ordered undbale to be completed due probe not passing through esophagus, SLP eval, PO pain meds and gabapentin started, per endo change Lantus 22, Lispro 7  ; no dysphagia or swallowing deficits but cannot be ruled out with barium swallow. Patient refusing labs in cluding VT.               ---------------------------------------------------------------------------  PLEASE CHECK WHEN PRESENT:  [ x ] Heart Failure  [  ] Acute  [  ] Acute on Chronic  [x  ] Chronic  -------------------------------------------------------------------  [  ]Diastolic [HFpEF]  [x  ]Systolic [HFrEF]  [  ]Combined [HFpEF & HFrEF]  [  ] afib  [x  ] hypertensive heart disease  [  ]Other:  -------------------------------------------------------------------  [ ] Respiratory failure  [ ] Acute cor pulmonale  [ ] Asthma/COPD Exacerbation  [ ] Pleural effusion  [ ] Aspiration pneumonia  -------------------------------------------------------------------  [  ]MOISES  [  ]ATN  [  ]Reneal Medullary Necrosis  [  ]Renal Cortical Necrosis  [  ]Other Pathological Lesions:    [  ]CKD 1  [  ]CKD 2  [  ]CKD 3  [  ]CKD 4  [  ]CKD 5  [  ]Other  -------------------------------------------------------------------  [ x ]Diabetes  [  ] Diabetic PVD Ulcer  [  ] Neuropathic ulcer to DM  [  ] Diabetes with Nephropathy  [  ] Osteomyelitis due to diabetes  --------------------------------------------------------------------  [  ]Malnutrition: See Nutrition Note  [  ]Cachexia  [  ]Other:   [  ]Supplement Ordered:  [  ]Morbid Obesity (BMI >=40]  ---------------------------------------------------------------------  [ ] Sepsis/severe sepsis/septic shock  [ ] UTI  [ ] Pneumonia  -----------------------------------------------------------------------  [ ] Acidosis/alkalosis  [ ] Fluid overload  [ ] Hypokalemia  [ ] Hyperkalemia  [ ] Hypomagnesemia  [ ] Hypophosphatemia  [ ] Hyperphosphatemia  ------------------------------------------------------------------------  [ ] Acute blood loss anemia  [ ] Post op blood loss anemia  [ ] Iron deficiency anemia  [ ] Anemia due to chronic disease  [ ] Hypercoagulable state  ----------------------------------------------------------------------  [ ] Cerebral infarction  [ ] Transient ischemia attack  [ ] Encephalopathy    A/P: 65yo M hx of CAD, CHF (EF 20-25%), DM, 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 Per ID: will need anx for 4-6 weeks pending JOHN PAUL. JOHN PAUL ordered unable to be completed due probe not passing through esophagus, SLP eval, PO pain meds and gabapentin started, per endo change Lantus 22, Lispro 7 ; no dysphagia or swallowing deficits but cannot be ruled out with barium swallow. Patient refusing labs including VT.     Subjective: PT was in a pleasant mood this morning, he had gotten dilaudid for dressing change at bedside. NPO this morning for JOHN PAUL. no complaints.     ROS:   Denies Headache, blurred vision, Chest Pain, SOB, Abdominal pain, nausea or vomiting     Social   furosemide    Tablet 40  heparin   Injectable 5000  spironolactone 25      Allergies    ACE inhibitors (Hives)  carvedilol (Other)  enalapril (Hives)  Entresto (Other)    Intolerances        Vital Signs Last 24 Hrs  T(C): 36.8 (12 Jan 2022 04:50), Max: 36.8 (12 Jan 2022 04:50)  T(F): 98.2 (12 Jan 2022 04:50), Max: 98.2 (12 Jan 2022 04:50)  HR: 82 (12 Jan 2022 05:41) (75 - 85)  BP: 113/58 (12 Jan 2022 05:41) (109/55 - 123/56)  BP(mean): 79 (11 Jan 2022 16:57) (77 - 80)  RR: 17 (12 Jan 2022 01:05) (17 - 20)  SpO2: 95% (12 Jan 2022 01:05) (95% - 98%)  I&O's Summary    11 Jan 2022 07:01  -  12 Jan 2022 07:00  --------------------------------------------------------  IN: 180 mL / OUT: 1150 mL / NET: -970 mL        Physical Exam:  General: NAD  Pulmonary: nonlabored breathing, RA  Cardiovascular: NSR  Abdominal: soft, NT/ND  Vascular: wound was clean, dry and intact. healthy tissue, pink and well perfused. minimal bleeding, Xeroform, gauze, kerlix and ace wraps used to redress stump.      LABS:                        8.5    13.52 )-----------( 294      ( 11 Jan 2022 23:36 )             26.5     01-11    134<L>  |  92<L>  |  14  ----------------------------<  204<H>  3.7   |  30  |  1.14    Ca    8.2<L>      11 Jan 2022 23:37  Phos  3.3     01-11  Mg     1.7     01-11      PT/INR - ( 10 Kris 2022 23:05 )   PT: 15.1 sec;   INR: 1.27          PTT - ( 10 Kris 2022 23:05 )  PTT:25.7 sec      ---------------------------------------------------------------------------  PLEASE CHECK WHEN PRESENT:  [ x ] Heart Failure  [  ] Acute  [  ] Acute on Chronic  [x  ] Chronic  -------------------------------------------------------------------  [  ]Diastolic [HFpEF]  [x  ]Systolic [HFrEF]  [  ]Combined [HFpEF & HFrEF]  [  ] afib  [x  ] hypertensive heart disease  [  ]Other:  -------------------------------------------------------------------  [ ] Respiratory failure  [ ] Acute cor pulmonale  [ ] Asthma/COPD Exacerbation  [ ] Pleural effusion  [ ] Aspiration pneumonia  -------------------------------------------------------------------  [  ]MOISES  [  ]ATN  [  ]Reneal Medullary Necrosis  [  ]Renal Cortical Necrosis  [  ]Other Pathological Lesions:    [  ]CKD 1  [  ]CKD 2  [  ]CKD 3  [  ]CKD 4  [  ]CKD 5  [  ]Other  -------------------------------------------------------------------  [ x ]Diabetes  [  ] Diabetic PVD Ulcer  [  ] Neuropathic ulcer to DM  [  ] Diabetes with Nephropathy  [  ] Osteomyelitis due to diabetes  --------------------------------------------------------------------  [  ]Malnutrition: See Nutrition Note  [  ]Cachexia  [  ]Other:   [  ]Supplement Ordered:  [  ]Morbid Obesity (BMI >=40]  ---------------------------------------------------------------------  [ ] Sepsis/severe sepsis/septic shock  [ ] UTI  [ ] Pneumonia  -----------------------------------------------------------------------  [ ] Acidosis/alkalosis  [ ] Fluid overload  [ ] Hypokalemia  [ ] Hyperkalemia  [ ] Hypomagnesemia  [ ] Hypophosphatemia  [ ] Hyperphosphatemia  ------------------------------------------------------------------------  [ ] Acute blood loss anemia  [ ] Post op blood loss anemia  [ ] Iron deficiency anemia  [ ] Anemia due to chronic disease  [ ] Hypercoagulable state  ----------------------------------------------------------------------  [ ] Cerebral infarction  [ ] Transient ischemia attack  [ ] Encephalopathy    A/P: 67yo M hx of CAD, CHF (EF 20-25%), DM, 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 Per ID: will need anx for 4-6 weeks pending JOHN PAUL. JOHN PAUL ordered unable to be completed due probe not passing through esophagus, SLP eval, PO pain meds and gabapentin started, per endo change Lantus 22, Lispro 7 ; no dysphagia or swallowing deficits but cannot be ruled out with barium swallow. Patient refusing labs including VT.     Subjective: PT was in a pleasant mood this morning, he had gotten dilaudid for dressing change at bedside. NPO this morning for JOHN PAUL. no complaints.     ROS:   Denies Headache, blurred vision, Chest Pain, SOB, Abdominal pain, nausea or vomiting     Social   furosemide    Tablet 40  heparin   Injectable 5000  spironolactone 25      Allergies    ACE inhibitors (Hives)  carvedilol (Other)  enalapril (Hives)  Entresto (Other)    Intolerances        Vital Signs Last 24 Hrs  T(C): 36.8 (12 Jan 2022 04:50), Max: 36.8 (12 Jan 2022 04:50)  T(F): 98.2 (12 Jan 2022 04:50), Max: 98.2 (12 Jan 2022 04:50)  HR: 82 (12 Jan 2022 05:41) (75 - 85)  BP: 113/58 (12 Jan 2022 05:41) (109/55 - 123/56)  BP(mean): 79 (11 Jan 2022 16:57) (77 - 80)  RR: 17 (12 Jan 2022 01:05) (17 - 20)  SpO2: 95% (12 Jan 2022 01:05) (95% - 98%)  I&O's Summary    11 Jan 2022 07:01  -  12 Jan 2022 07:00  --------------------------------------------------------  IN: 180 mL / OUT: 1150 mL / NET: -970 mL        Physical Exam:  General: NAD  Pulmonary: nonlabored breathing, RA  Cardiovascular: NSR  Abdominal: soft, NT/ND  Vascular: wound was clean, dry and intact. healthy tissue, pink and well perfused. minimal bleeding, Xeroform, gauze, kerlix and ace wraps used to redress stump.      LABS:                        8.5    13.52 )-----------( 294      ( 11 Jan 2022 23:36 )             26.5     01-11    134<L>  |  92<L>  |  14  ----------------------------<  204<H>  3.7   |  30  |  1.14    Ca    8.2<L>      11 Jan 2022 23:37  Phos  3.3     01-11  Mg     1.7     01-11      PT/INR - ( 10 Kris 2022 23:05 )   PT: 15.1 sec;   INR: 1.27          PTT - ( 10 Kris 2022 23:05 )  PTT:25.7 sec      ---------------------------------------------------------------------------  PLEASE CHECK WHEN PRESENT:  [ x ] Heart Failure  [  ] Acute  [  ] Acute on Chronic  [x  ] Chronic  -------------------------------------------------------------------  [  ]Diastolic [HFpEF]  [x  ]Systolic [HFrEF]  [  ]Combined [HFpEF & HFrEF]  [  ] afib  [x  ] hypertensive heart disease  [  ]Other:  -------------------------------------------------------------------  [ ] Respiratory failure  [ ] Acute cor pulmonale  [ ] Asthma/COPD Exacerbation  [ ] Pleural effusion  [ ] Aspiration pneumonia  -------------------------------------------------------------------  [  ]MOISES  [  ]ATN  [  ]Reneal Medullary Necrosis  [  ]Renal Cortical Necrosis  [  ]Other Pathological Lesions:    [  ]CKD 1  [  ]CKD 2  [  ]CKD 3  [  ]CKD 4  [  ]CKD 5  [  ]Other  -------------------------------------------------------------------  [ x ]Diabetes  [  ] Diabetic PVD Ulcer  [  ] Neuropathic ulcer to DM  [  ] Diabetes with Nephropathy  [  ] Osteomyelitis due to diabetes  --------------------------------------------------------------------  [  ]Malnutrition: See Nutrition Note  [  ]Cachexia  [  ]Other:   [  ]Supplement Ordered:  [  ]Morbid Obesity (BMI >=40]  ---------------------------------------------------------------------  [ ] Sepsis/severe sepsis/septic shock  [ ] UTI  [ ] Pneumonia  -----------------------------------------------------------------------  [ ] Acidosis/alkalosis  [ ] Fluid overload  [ ] Hypokalemia  [ ] Hyperkalemia  [ ] Hypomagnesemia  [ ] Hypophosphatemia  [ ] Hyperphosphatemia  ------------------------------------------------------------------------  [ ] Acute blood loss anemia  [ ] Post op blood loss anemia  [ ] Iron deficiency anemia  [ ] Anemia due to chronic disease  [ ] Hypercoagulable state  ----------------------------------------------------------------------  [ ] Cerebral infarction  [ ] Transient ischemia attack  [ ] Encephalopathy    A/P: 67yo M hx of CAD, CHF (EF 20-25%), DM, R TKA several years ago c/b recurrent PJI and revisions, 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   - picc placed for long term abx.   - abx duration pending john paul    DM:  -ISS  -Lantus 22, lispro 7 TID    Diet:   -Regular /NPO PM    Activity:   -PT/OT consult  - anticipate acute rehab placement when medically ready for discharge     DVTPPx: HSQ    Dispo:  pending closure of right above knee amputation on Friday 1/14

## 2022-01-12 NOTE — PHYSICAL THERAPY INITIAL EVALUATION ADULT - IMPAIRMENTS CONTRIBUTING IMPAIRED BED MOBILITY, REHAB EVAL
dangle with contact guard to min assist/impaired balance/pain/impaired postural control/decreased strength

## 2022-01-12 NOTE — PROGRESS NOTE ADULT - PROBLEM SELECTOR PLAN 1
Please increase lantus  to 25 units at night .  Please increase lispro to 9  units before each meal.  Please continue lispro moderate dose sliding scale four times daily with meals and at bedtime    Pt's fingerstick glucose goal is 100-180.    Will continue to monitor     For discharge, pt can continue    Pt can follow up at discharge with Dr Schmitt at scheduled appt. 2/8.  Will discuss case with Dr. Luevano    and update primary team.

## 2022-01-12 NOTE — PHYSICAL THERAPY INITIAL EVALUATION ADULT - IMPAIRMENTS FOUND, PT EVAL
aerobic capacity/endurance/gait, locomotion, and balance/integumentary integrity/joint integrity and mobility/ROM

## 2022-01-12 NOTE — PROGRESS NOTE ADULT - SUBJECTIVE AND OBJECTIVE BOX
INFECTIOUS DISEASES CONSULT FOLLOW-UP NOTE    INTERVAL HPI/OVERNIGHT EVENTS:  no event overnight  patient got JOHN PAUL today  reports feeling ok, denied new complaints     ROS:   Constitutional, eyes, ENT, cardiovascular, respiratory, gastrointestinal, genitourinary, integumentary, neurological, psychiatric and heme/lymph are otherwise negative other than noted above       ANTIBIOTICS/RELEVANT:    MEDICATIONS  (STANDING):  atorvastatin 80 milliGRAM(s) Oral at bedtime  chlorhexidine 2% Cloths 1 Application(s) Topical <User Schedule>  dextrose 40% Gel 15 Gram(s) Oral once  dextrose 5%. 1000 milliLiter(s) (50 mL/Hr) IV Continuous <Continuous>  dextrose 5%. 1000 milliLiter(s) (100 mL/Hr) IV Continuous <Continuous>  dextrose 50% Injectable 25 Gram(s) IV Push once  dextrose 50% Injectable 12.5 Gram(s) IV Push once  dextrose 50% Injectable 25 Gram(s) IV Push once  DULoxetine 20 milliGRAM(s) Oral daily  furosemide    Tablet 40 milliGRAM(s) Oral daily  gabapentin 100 milliGRAM(s) Oral three times a day  glucagon  Injectable 1 milliGRAM(s) IntraMuscular once  heparin   Injectable 5000 Unit(s) SubCutaneous every 8 hours  insulin glargine Injectable (LANTUS) 22 Unit(s) SubCutaneous at bedtime  insulin lispro (ADMELOG) corrective regimen sliding scale   SubCutaneous every 6 hours  insulin lispro Injectable (ADMELOG) 7 Unit(s) SubCutaneous three times a day before meals  levothyroxine 25 MICROGram(s) Oral daily  pantoprazole    Tablet 40 milliGRAM(s) Oral before breakfast  povidone iodine 5% Nasal Swab 1 Application(s) Both Nostrils once  spironolactone 25 milliGRAM(s) Oral daily    MEDICATIONS  (PRN):  acetaminophen     Tablet .. 650 milliGRAM(s) Oral every 6 hours PRN Mild Pain (1 - 3)  ALPRAZolam 0.5 milliGRAM(s) Oral every 8 hours PRN anxiety  aluminum hydroxide/magnesium hydroxide/simethicone Suspension 30 milliLiter(s) Oral every 4 hours PRN Dyspepsia  cyclobenzaprine 5 milliGRAM(s) Oral three times a day PRN Muscle Spasm  oxyCODONE    IR 5 milliGRAM(s) Oral every 4 hours PRN Moderate Pain (4 - 6)  sodium chloride 0.9% lock flush 10 milliLiter(s) IV Push every 1 hour PRN Pre/post blood products, medications, blood draw, and to maintain line patency        Vital Signs Last 24 Hrs  T(C): 37 (12 Jan 2022 11:03), Max: 37 (12 Jan 2022 11:03)  T(F): 98.6 (12 Jan 2022 11:03), Max: 98.6 (12 Jan 2022 11:03)  HR: 81 (12 Jan 2022 10:48) (75 - 83)  BP: 106/59 (12 Jan 2022 10:48) (106/59 - 123/56)  BP(mean): 79 (11 Jan 2022 16:57) (78 - 79)  RR: 16 (12 Jan 2022 10:48) (16 - 20)  SpO2: 98% (12 Jan 2022 10:48) (95% - 98%)    01-11-22 @ 07:01  -  01-12-22 @ 07:00  --------------------------------------------------------  IN: 180 mL / OUT: 1150 mL / NET: -970 mL    01-12-22 @ 07:01  -  01-12-22 @ 12:45  --------------------------------------------------------  IN: 0 mL / OUT: 0 mL / NET: 0 mL      PHYSICAL EXAM:  Constitutional: alert, NAD  Eyes: the sclera and conjunctiva were normal.   ENT: the ears and nose were normal in appearance.   Neck: the appearance of the neck was normal and the neck was supple.   Pulmonary: no respiratory distress and lungs were clear to auscultation bilaterally.   Heart: heart rate was normal and rhythm regular, normal S1 and S2  Ext: s/p R AKA   Abdomen: normal bowel sounds, soft, non-tender  Neurological: no focal deficits.   Psychiatric: the affect was normal        LABS:                        8.5    13.52 )-----------( 294      ( 11 Jan 2022 23:36 )             26.5     01-11    134<L>  |  92<L>  |  14  ----------------------------<  204<H>  3.7   |  30  |  1.14    Ca    8.2<L>      11 Jan 2022 23:37  Phos  3.3     01-11  Mg     1.7     01-11      PT/INR - ( 10 Kris 2022 23:05 )   PT: 15.1 sec;   INR: 1.27          PTT - ( 10 Kris 2022 23:05 )  PTT:25.7 sec      MICROBIOLOGY:  1/7 BCx ngtd  1/6 BCx ngtd  1/5 BCx MRSA  1/5 BCx MRSA  1/5 Synovial fluid: MRSA      RADIOLOGY & ADDITIONAL STUDIES:  Gallium scan  Impression: No evidence of ICD infection is noted. There is activity on   the medial aspect of the right leg just above the knee which may be   related to patient's recent surgery.    Please note that gallium is insensitive to possible infection at the tip   of the ICD wires. Transesophageal echocardiography is pending.

## 2022-01-12 NOTE — OCCUPATIONAL THERAPY INITIAL EVALUATION ADULT - ASSISTIVE DEVICE: SCOOT/BRIDGE, REHAB EVAL
CHIEF COMPLAINT:  Diagnosed with celiac sprue four years ago and questionable   early IBD.    HISTORY OF PRESENT ILLNESS:  A 42-year-old white female who states she was   diagnosed about four years ago in [facility] by a large GI group.  She   says she was biopsied by serology as well as biopsy.  She also went to the [facility] around 2016 for two weeks.  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  She says that the [facility] confirmed   her celiac sprue, sent her to a Nutrition.  She has been on gluten-free diet   since, doing well.  She says she may have had C scopes within the last five   years for chronic constipation, although, she eats dry mangoes each day, two or   more slices.  She has a bowel movement every day.  No blood in her stool.  No   weight loss.  She does have chronic fatigue.  No fever or chills.  She says she   was taking may be Apriso for a few years.  She does not know she absolutely   needs to be on it, but cannot afford it any more.  We do not have any copies of   her medical records showing that she has inflammatory bowel disease.  I would   like to see that before prescribing a long-term 5-ASA medicines that have   potential for complications, albeit fairly safe medication.  She said that we   did look through the medical records through media miscellaneous reports looking   for scan and under procedures in pathology, seen none.  We asked her to sign   medical release of information toto.  We would be happy to review any path from   a colonoscopy or an EGD that we can receive either from the [facility] or from   her prior GI doctors or summary note.    REVIEW OF SYSTEMS:  CONSTITUTIONAL:  No fever or chills.  She has chronic fatigue.  No weight loss.  EYES:  No episcleritis, uveitis or scleritis.  ENT:  No difficulty swallowing or sore throat.  CARDIOVASCULAR:  No chest pains maybe occasional palpitations.  RESPIRATORY:  No shortness of  breath, no wheezing, no dyspnea on exertion.  GENITOURINARY:  No dysuria, urgency, frequency or hematuria.  MUSCULOSKELETAL:  She has a little bit of arthritis, not taking a lot of NSAIDs.  SKIN:  No itching or rash.  NEUROLOGIC:  No syncope or stroke.  She does suffer from migraines or headaches.  PSYCHIATRIC:  No uncontrolled depression or anxiety.  ENDOCRINE:  No cold or heat intolerance.  LYMPHATICS:  No lymphadenopathy.  ALLERGIES:  SHE IS ALLERGIC TO LATEX, GLUTEN PROTEIN.  GASTROINTESTINAL:  No nausea or vomiting, no heartburn, no abdominal pain, no   diarrhea.  She does have chronic constipation, but stable for many years.  She   does have a bowel movement once a day.    PAST MEDICAL HISTORY:  Positive for hypertension.  Negative for diabetes, heart   disease, lung disease, cancer or jaundice.  Positive for celiac sprue she says   and small fiber neuropathy with autonomic disruption.    PAST SURGICAL HISTORY:  Cervical diskectomy, D and C.    FAMILY HISTORY:  Mom's dad with celiac sprue.  Mom's dad with rectal cancer in   his 80s.  No other first or second-degree relatives with colon cancer or rectal   cancer.  Nobody with FAP, attenuated FAP, MAP or Amaya syndrome.  Nobody with   chronic hepatitis, cirrhosis of liver, liver cancer, pancreatitis or pancreatic   cancer.  Nobody with esophageal, stomach or small bowel, pancreas or gallbladder   cancer.  No bladder, ovarian or uterine cancer or kidney cancer.    SOCIAL HISTORY:  She quit smoking in 2008.  She rarely drinks alcohol.  She says   she does not work.  She is disabled.  She is working on her disability   currently.  She is on her first marriage since 2008.  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  She has never had any children.    PHYSICAL EXAMINATION:  VITAL SIGNS:  With chaperone in the room, Deidra, blood pressure is 138/104,   pulse is 80.  She is 192 pounds, 5 feet 6 inches tall.  BMI is 30.63.  GENERAL:  She is alert and oriented x4,  not in any acute distress.  ABDOMEN:  Soft.  No guarding.  No rebound.  No tenderness.  No palpable   organomegaly.  No bruits.  No pulsatile masses.  No stigmata of chronic liver   disease.  No appreciative ascites or hernias.  Normoactive bowel sounds.  No   Duncan sign.  No McBurney point tenderness.  No CVA tenderness.  EYES:  Conjunctivae are nonicteric.  CARDIOVASCULAR:  S1 and S2 without murmurs, gallops or rubs.  RESPIRATORY:  Clear to auscultation bilaterally without wheezes, rhonchi or   rales.  SKIN:  No petechiae or rash on exposed skin areas.  NEUROLOGIC:  Alert and oriented x4.  PSYCHIATRIC:  Normal speech, mentation and affect.  LYMPHATICS:  No cervical or supraclavicular lymphadenopathy.  No appreciative   thyromegaly.  ORAL CAVITY:  Clear without lesions.    MEDICAL DECISION MAKING:  As above.  Vitamin D is normal.  TSH is normal.  B12   is normal.  Basic metabolic panel shows slightly elevated glucose at 118,   undetermined if it is fasting or not.  Prior CBC shows no anemia.  As the   patient to sign medical release of information gets us a copy of her prior EGDs,   colonoscopy and path report.  She knows to stay on a gluten-free diet for life   if she diagnosed with celiac sprue.  I would be happy to look at any of her   biopsies to determine if there has been a diagnosis of Crohn's or ulcerative   colitis.  The patient knows to call us to check on this.    IMPRESSION AND PLAN:  1.  Elevated blood pressure today.  Recommend the patient follow up with primary   care doctor next week for recheck for better control.  2.  History of celiac sprue.  I will be happy to review any path from a prior   EGD or biopsy or lab work we do not have it, confirming that, but if she has it.    She has seen a dietitian.  She is on a gluten-free diet for life.  3.  Questionable history of colitis.  We will be happy to review any pathology   from prior EGDs or colonoscopy biopsy review to determine if any further    evaluation is needed.  The patient is currently not interested in EGD or   colonoscopy.  She can follow up with GI as needed.  Recommend she call us in the   next two weeks to make sure we received medical records and that they have been   reviewed.      SEC/IN  dd: 01/18/2019 19:03:35 (CST)  td: 01/19/2019 10:23:03 (CST)  Doc ID   #[id number]  Job ID #[id number]    CC:        bed rails

## 2022-01-13 LAB
ANION GAP SERPL CALC-SCNC: 11 MMOL/L — SIGNIFICANT CHANGE UP (ref 5–17)
APTT BLD: 26.2 SEC — LOW (ref 27.5–35.5)
BLD GP AB SCN SERPL QL: NEGATIVE — SIGNIFICANT CHANGE UP
BUN SERPL-MCNC: 15 MG/DL — SIGNIFICANT CHANGE UP (ref 7–23)
CALCIUM SERPL-MCNC: 8 MG/DL — LOW (ref 8.4–10.5)
CHLORIDE SERPL-SCNC: 93 MMOL/L — LOW (ref 96–108)
CO2 SERPL-SCNC: 28 MMOL/L — SIGNIFICANT CHANGE UP (ref 22–31)
CREAT SERPL-MCNC: 1.36 MG/DL — HIGH (ref 0.5–1.3)
GLUCOSE BLDC GLUCOMTR-MCNC: 124 MG/DL — HIGH (ref 70–99)
GLUCOSE BLDC GLUCOMTR-MCNC: 150 MG/DL — HIGH (ref 70–99)
GLUCOSE BLDC GLUCOMTR-MCNC: 249 MG/DL — HIGH (ref 70–99)
GLUCOSE BLDC GLUCOMTR-MCNC: 295 MG/DL — HIGH (ref 70–99)
GLUCOSE SERPL-MCNC: 242 MG/DL — HIGH (ref 70–99)
HCT VFR BLD CALC: 26.4 % — LOW (ref 39–50)
HGB BLD-MCNC: 8.2 G/DL — LOW (ref 13–17)
INR BLD: 1.25 — HIGH (ref 0.88–1.16)
MAGNESIUM SERPL-MCNC: 2 MG/DL — SIGNIFICANT CHANGE UP (ref 1.6–2.6)
MCHC RBC-ENTMCNC: 25 PG — LOW (ref 27–34)
MCHC RBC-ENTMCNC: 31.1 GM/DL — LOW (ref 32–36)
MCV RBC AUTO: 80.5 FL — SIGNIFICANT CHANGE UP (ref 80–100)
NRBC # BLD: 0 /100 WBCS — SIGNIFICANT CHANGE UP (ref 0–0)
PHOSPHATE SERPL-MCNC: 3.3 MG/DL — SIGNIFICANT CHANGE UP (ref 2.5–4.5)
PLATELET # BLD AUTO: 346 K/UL — SIGNIFICANT CHANGE UP (ref 150–400)
POTASSIUM SERPL-MCNC: 3.7 MMOL/L — SIGNIFICANT CHANGE UP (ref 3.5–5.3)
POTASSIUM SERPL-SCNC: 3.7 MMOL/L — SIGNIFICANT CHANGE UP (ref 3.5–5.3)
PROTHROM AB SERPL-ACNC: 14.8 SEC — HIGH (ref 10.6–13.6)
RBC # BLD: 3.28 M/UL — LOW (ref 4.2–5.8)
RBC # FLD: 17.8 % — HIGH (ref 10.3–14.5)
RH IG SCN BLD-IMP: POSITIVE — SIGNIFICANT CHANGE UP
SARS-COV-2 RNA SPEC QL NAA+PROBE: SIGNIFICANT CHANGE UP
SODIUM SERPL-SCNC: 132 MMOL/L — LOW (ref 135–145)
WBC # BLD: 12.86 K/UL — HIGH (ref 3.8–10.5)
WBC # FLD AUTO: 12.86 K/UL — HIGH (ref 3.8–10.5)

## 2022-01-13 PROCEDURE — 99231 SBSQ HOSP IP/OBS SF/LOW 25: CPT | Mod: GC

## 2022-01-13 PROCEDURE — 99232 SBSQ HOSP IP/OBS MODERATE 35: CPT

## 2022-01-13 RX ORDER — INSULIN GLARGINE 100 [IU]/ML
25 INJECTION, SOLUTION SUBCUTANEOUS AT BEDTIME
Refills: 0 | Status: DISCONTINUED | OUTPATIENT
Start: 2022-01-13 | End: 2022-01-14

## 2022-01-13 RX ORDER — DAPTOMYCIN 500 MG/10ML
600 INJECTION, POWDER, LYOPHILIZED, FOR SOLUTION INTRAVENOUS EVERY 24 HOURS
Refills: 0 | Status: COMPLETED | OUTPATIENT
Start: 2022-01-13 | End: 2022-01-26

## 2022-01-13 RX ORDER — INSULIN GLARGINE 100 [IU]/ML
13 INJECTION, SOLUTION SUBCUTANEOUS AT BEDTIME
Refills: 0 | Status: DISCONTINUED | OUTPATIENT
Start: 2022-01-13 | End: 2022-01-13

## 2022-01-13 RX ORDER — POTASSIUM CHLORIDE 20 MEQ
40 PACKET (EA) ORAL ONCE
Refills: 0 | Status: COMPLETED | OUTPATIENT
Start: 2022-01-13 | End: 2022-01-13

## 2022-01-13 RX ADMIN — HEPARIN SODIUM 5000 UNIT(S): 5000 INJECTION INTRAVENOUS; SUBCUTANEOUS at 05:27

## 2022-01-13 RX ADMIN — OXYCODONE HYDROCHLORIDE 10 MILLIGRAM(S): 5 TABLET ORAL at 05:30

## 2022-01-13 RX ADMIN — GABAPENTIN 100 MILLIGRAM(S): 400 CAPSULE ORAL at 05:28

## 2022-01-13 RX ADMIN — DAPTOMYCIN 124 MILLIGRAM(S): 500 INJECTION, POWDER, LYOPHILIZED, FOR SOLUTION INTRAVENOUS at 18:08

## 2022-01-13 RX ADMIN — Medication 4: at 06:54

## 2022-01-13 RX ADMIN — GABAPENTIN 100 MILLIGRAM(S): 400 CAPSULE ORAL at 13:23

## 2022-01-13 RX ADMIN — OXYCODONE HYDROCHLORIDE 10 MILLIGRAM(S): 5 TABLET ORAL at 23:48

## 2022-01-13 RX ADMIN — HYDROMORPHONE HYDROCHLORIDE 0.5 MILLIGRAM(S): 2 INJECTION INTRAMUSCULAR; INTRAVENOUS; SUBCUTANEOUS at 06:00

## 2022-01-13 RX ADMIN — Medication 25 MICROGRAM(S): at 05:29

## 2022-01-13 RX ADMIN — OXYCODONE HYDROCHLORIDE 10 MILLIGRAM(S): 5 TABLET ORAL at 11:59

## 2022-01-13 RX ADMIN — HEPARIN SODIUM 5000 UNIT(S): 5000 INJECTION INTRAVENOUS; SUBCUTANEOUS at 13:23

## 2022-01-13 RX ADMIN — OXYCODONE HYDROCHLORIDE 10 MILLIGRAM(S): 5 TABLET ORAL at 06:30

## 2022-01-13 RX ADMIN — ATORVASTATIN CALCIUM 80 MILLIGRAM(S): 80 TABLET, FILM COATED ORAL at 21:57

## 2022-01-13 RX ADMIN — Medication 9 UNIT(S): at 07:27

## 2022-01-13 RX ADMIN — Medication 9 UNIT(S): at 13:23

## 2022-01-13 RX ADMIN — Medication 40 MILLIGRAM(S): at 05:29

## 2022-01-13 RX ADMIN — HEPARIN SODIUM 5000 UNIT(S): 5000 INJECTION INTRAVENOUS; SUBCUTANEOUS at 21:57

## 2022-01-13 RX ADMIN — OXYCODONE HYDROCHLORIDE 10 MILLIGRAM(S): 5 TABLET ORAL at 12:59

## 2022-01-13 RX ADMIN — GABAPENTIN 100 MILLIGRAM(S): 400 CAPSULE ORAL at 21:57

## 2022-01-13 RX ADMIN — Medication 6: at 21:55

## 2022-01-13 RX ADMIN — INSULIN GLARGINE 25 UNIT(S): 100 INJECTION, SOLUTION SUBCUTANEOUS at 22:05

## 2022-01-13 RX ADMIN — DULOXETINE HYDROCHLORIDE 20 MILLIGRAM(S): 30 CAPSULE, DELAYED RELEASE ORAL at 11:59

## 2022-01-13 RX ADMIN — SPIRONOLACTONE 25 MILLIGRAM(S): 25 TABLET, FILM COATED ORAL at 05:29

## 2022-01-13 RX ADMIN — PANTOPRAZOLE SODIUM 40 MILLIGRAM(S): 20 TABLET, DELAYED RELEASE ORAL at 05:29

## 2022-01-13 RX ADMIN — HYDROMORPHONE HYDROCHLORIDE 0.5 MILLIGRAM(S): 2 INJECTION INTRAMUSCULAR; INTRAVENOUS; SUBCUTANEOUS at 06:20

## 2022-01-13 RX ADMIN — Medication 40 MILLIEQUIVALENT(S): at 09:28

## 2022-01-13 NOTE — PROGRESS NOTE ADULT - ASSESSMENT
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   Initially underwent antibiotic cement spacer exchange, I&D and revision gastroc flap by PRS on 1/6.  Bacteremia cleared since 1/6.  Patient already had multiple recurrence and he had flank pus in his R knee.  Now s/p AKA on 1/10 - source control achieved.   Gallium scan negative for ICD infection and JOHN PAUL neg for vegetation on valve and leads.  Plan for AKA flap closure tomorrow.   Since patient developed MOISES on vancomycin, will switch to dapto.    - stop vanco  - switch to daptomycin 600mg IV q24h  - check CK   - Duration of antibiotics is 4 weeks from the last surgery (1/14 - 2/10) to treat MRSA bacteremia   - Weekly labs: CMP, CBC, ESR, CRP, CK faxed to ID office at [phone]  - Patient to follow up with Dr. Casillas in 2 weeks ([address] [place] [address], [phone]), ID office will call patient to schedule     Thank you for your consult.  Please re-consult us or call us with questions.  Case d/w primary team.    Marta Ruffin MD, MS  Infectious Disease attending  work cell [phone]  For any questions during evening/weekend/holiday, please page ID on call

## 2022-01-13 NOTE — PROGRESS NOTE ADULT - SUBJECTIVE AND OBJECTIVE BOX
INTERVAL HPI/OVERNIGHT EVENTS:    Patient is a 66y old  Male who presents with a chief complaint of R Knee Pain (2022 10:23)    FSG & Insulin received:    Yesterday:  pre-dinner fs  nutritional lispro 0  units + 4  units lispro SS  bedtime fs  lantus  27 units + 4   units lispro SS    Today:  pre-breakfast fs  nutritional lispro 9  units+ 4   units lispro SS  pre-lunch fsg:  nutritional lispro   units+   units lispro SS  Pt reports the following symptoms:    CONSTITUTIONAL:  Negative fever or chills, feels well, good appetite  EYES:  Negative  blurry vision or double vision  CARDIOVASCULAR:  Negative for chest pain or palpitations  RESPIRATORY:  Negative for cough, wheezing, or SOB   GASTROINTESTINAL:  Negative for nausea, vomiting, diarrhea, constipation, or abdominal pain  GENITOURINARY:  Negative frequency, urgency or dysuria  NEUROLOGIC:  No headache, confusion, dizziness, lightheadedness    MEDICATIONS  (STANDING):  atorvastatin 80 milliGRAM(s) Oral at bedtime  chlorhexidine 2% Cloths 1 Application(s) Topical <User Schedule>  dextrose 40% Gel 15 Gram(s) Oral once  dextrose 5%. 1000 milliLiter(s) (50 mL/Hr) IV Continuous <Continuous>  dextrose 5%. 1000 milliLiter(s) (100 mL/Hr) IV Continuous <Continuous>  dextrose 50% Injectable 25 Gram(s) IV Push once  dextrose 50% Injectable 12.5 Gram(s) IV Push once  dextrose 50% Injectable 25 Gram(s) IV Push once  DULoxetine 20 milliGRAM(s) Oral daily  furosemide    Tablet 40 milliGRAM(s) Oral daily  gabapentin 100 milliGRAM(s) Oral three times a day  glucagon  Injectable 1 milliGRAM(s) IntraMuscular once  heparin   Injectable 5000 Unit(s) SubCutaneous every 8 hours  insulin glargine Injectable (LANTUS) 27 Unit(s) SubCutaneous at bedtime  insulin lispro (ADMELOG) corrective regimen sliding scale   SubCutaneous Before meals and at bedtime  insulin lispro Injectable (ADMELOG) 9 Unit(s) SubCutaneous three times a day before meals  levothyroxine 25 MICROGram(s) Oral daily  pantoprazole    Tablet 40 milliGRAM(s) Oral before breakfast  povidone iodine 5% Nasal Swab 1 Application(s) Both Nostrils once  spironolactone 25 milliGRAM(s) Oral daily  vancomycin  IVPB 1250 milliGRAM(s) IV Intermittent every 24 hours    MEDICATIONS  (PRN):  acetaminophen     Tablet .. 650 milliGRAM(s) Oral every 6 hours PRN Mild Pain (1 - 3)  ALPRAZolam 0.5 milliGRAM(s) Oral every 8 hours PRN anxiety  aluminum hydroxide/magnesium hydroxide/simethicone Suspension 30 milliLiter(s) Oral every 4 hours PRN Dyspepsia  cyclobenzaprine 5 milliGRAM(s) Oral three times a day PRN Muscle Spasm  HYDROmorphone  Injectable 0.5 milliGRAM(s) IV Push every 4 hours PRN Breakthrough pain  oxyCODONE    IR 10 milliGRAM(s) Oral every 6 hours PRN Severe Pain (7 - 10)  oxyCODONE    IR 5 milliGRAM(s) Oral every 4 hours PRN Moderate Pain (4 - 6)  sodium chloride 0.9% lock flush 10 milliLiter(s) IV Push every 1 hour PRN Pre/post blood products, medications, blood draw, and to maintain line patency      PHYSICAL EXAM  Vital Signs Last 24 Hrs  T(C): 36.8 (2022 10:10), Max: 37.3 (2022 14:00)  T(F): 98.2 (2022 10:10), Max: 99.1 (2022 14:00)  HR: 90 (2022 08:41) (79 - 92)  BP: 83/46 (2022 08:41) (83/46 - 120/58)  BP(mean): 66 (2022 08:41) (66 - 69)  RR: 18 (2022 08:41) (16 - 18)  SpO2: 95% (2022 08:41) (95% - 98%)    Constitutional: NAD.   HEENT: NCAT, MMM, OP clear, EOMI, , no proptosis or lid retraction  Neck: no thyromegaly or palpable thyroid nodules   Respiratory: lungs CTAB.  Cardiovascular: regular rhythm, normal S1 and S2, no audible murmurs  GI: soft, NT/ND, no masses/HSM appreciated.  Neurology: no tremors  Skin: no visible rashes/lesions, R AKA   Psychiatric: AAO x 3, normal affect/mood.    LABS:                        8.2    12.86 )-----------( 346      ( 2022 07:26 )             26.4     13    132<L>  |  93<L>  |  15  ----------------------------<  242<H>  3.7   |  28  |  1.36<H>    Ca    8.0<L>      2022 07:26  Phos  3.3       Mg     2.0           PT/INR - ( 2022 10:17 )   PT: 14.8 sec;   INR: 1.25          PTT - ( 2022 10:17 )  PTT:26.2 sec    Thyroid Stimulating Hormone, Serum: 2.850 uIU/mL ( @ 08:25)  Thyroid Stimulating Hormone, Serum: 1.68 uIU/mL ( @ 17:00)      HbA1C:         Insulin Sliding Scale requirements X 24 Hours:      RADIOLOGY & ADDITIONAL TESTS:      Assessment and Plan:   · Assessment	  66M hx of CAD with CABG , CHF (EF 20-25%) with AICD, T2DM c/b neuropathy with hx of diabetic ulcer, HLD. HTN, COPD, diverticulitis, celiac disease, anxiety and depression, cholecystostomy, R hip replacement R TKA several years ago c/b recurrent PJI and revisions, most recently in 2020 by Dr. Castro (explant and abx spacer exchange), transferred here from [facility] for recurrent PJI for repeat spacer exchange and possible flap coverage with Dr. Vaughn/Dr. Bowen. Pt has been experiencing atraumatic worsening R knee pain x 1 month. Notes he went to a procedure center where his knee was aspirated 3 weeks ago. Pt was seen at [facility] yesterday where his knee was aspirated with 283k cell count. WBC 14, ESR 78, +, AVSS. Pt started on vancomycin + rifampin. 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 Now s/p Replacement, antibiotic spacer 2022. Also with hyperglycemia.  now s/p R AKA on vascular service     A1C: 9.0%  Weight: 97kg BMI 28  Cr/GFR: 0.9/104  EF: 20-25%     Problem/Plan - 1:  ·  Problem: Type 2 diabetes mellitus.   ·  Plan: Please increase lantus  to  units at night .  Please increase lispro to   units before each meal.  Please continue lispro moderate dose sliding scale four times daily with meals and at bedtime    Pt's fingerstick glucose goal is 100-180.    Will continue to monitor     For discharge, pt can continue    Pt can follow up at discharge with Dr Schmitt at scheduled appt. .  Will discuss case with Dr. Luevano    and update primary team.     Problem/Plan - 2:  ·  Problem: Hypothyroidism.   ·  Plan: TSH 4-5 past several years, not on therapy. 2021 - TSH 8.08, free T4 0.9; started on levothyroxine 25mcg daily.  continue levothyroxine 25mcg daily.  TSH:2.85  Ft4:0.941.   INTERVAL HPI/OVERNIGHT EVENTS:    Patient is a 66y old  Male who presents with a chief complaint of R Knee Pain (2022 10:23)  Patient did not recieve dinner last night and ordered chinese food from outside. He does not remember exactly what he ate but may have been egg roll or dumplings. Had egg and cheese on a roll from outside for breakfast.   FSG & Insulin received:    Yesterday:  pre-dinner fs  nutritional lispro 0  units + 4  units lispro SS  bedtime fs  lantus  27 units + 4   units lispro SS    Today:  pre-breakfast fs  nutritional lispro 9  units+ 4   units lispro SS  pre-lunch fs  nutritional lispro   units+   units lispro SS  Pt reports the following symptoms:    CONSTITUTIONAL:  Negative fever or chills, feels well, good appetite  EYES:  Negative  blurry vision or double vision  CARDIOVASCULAR:  Negative for chest pain or palpitations  RESPIRATORY:  Negative for cough, wheezing, or SOB   GASTROINTESTINAL:  Negative for nausea, vomiting, diarrhea, constipation, or abdominal pain  GENITOURINARY:  Negative frequency, urgency or dysuria  NEUROLOGIC:  No headache, confusion, dizziness, lightheadedness    MEDICATIONS  (STANDING):  atorvastatin 80 milliGRAM(s) Oral at bedtime  chlorhexidine 2% Cloths 1 Application(s) Topical <User Schedule>  dextrose 40% Gel 15 Gram(s) Oral once  dextrose 5%. 1000 milliLiter(s) (50 mL/Hr) IV Continuous <Continuous>  dextrose 5%. 1000 milliLiter(s) (100 mL/Hr) IV Continuous <Continuous>  dextrose 50% Injectable 25 Gram(s) IV Push once  dextrose 50% Injectable 12.5 Gram(s) IV Push once  dextrose 50% Injectable 25 Gram(s) IV Push once  DULoxetine 20 milliGRAM(s) Oral daily  furosemide    Tablet 40 milliGRAM(s) Oral daily  gabapentin 100 milliGRAM(s) Oral three times a day  glucagon  Injectable 1 milliGRAM(s) IntraMuscular once  heparin   Injectable 5000 Unit(s) SubCutaneous every 8 hours  insulin glargine Injectable (LANTUS) 27 Unit(s) SubCutaneous at bedtime  insulin lispro (ADMELOG) corrective regimen sliding scale   SubCutaneous Before meals and at bedtime  insulin lispro Injectable (ADMELOG) 9 Unit(s) SubCutaneous three times a day before meals  levothyroxine 25 MICROGram(s) Oral daily  pantoprazole    Tablet 40 milliGRAM(s) Oral before breakfast  povidone iodine 5% Nasal Swab 1 Application(s) Both Nostrils once  spironolactone 25 milliGRAM(s) Oral daily  vancomycin  IVPB 1250 milliGRAM(s) IV Intermittent every 24 hours    MEDICATIONS  (PRN):  acetaminophen     Tablet .. 650 milliGRAM(s) Oral every 6 hours PRN Mild Pain (1 - 3)  ALPRAZolam 0.5 milliGRAM(s) Oral every 8 hours PRN anxiety  aluminum hydroxide/magnesium hydroxide/simethicone Suspension 30 milliLiter(s) Oral every 4 hours PRN Dyspepsia  cyclobenzaprine 5 milliGRAM(s) Oral three times a day PRN Muscle Spasm  HYDROmorphone  Injectable 0.5 milliGRAM(s) IV Push every 4 hours PRN Breakthrough pain  oxyCODONE    IR 10 milliGRAM(s) Oral every 6 hours PRN Severe Pain (7 - 10)  oxyCODONE    IR 5 milliGRAM(s) Oral every 4 hours PRN Moderate Pain (4 - 6)  sodium chloride 0.9% lock flush 10 milliLiter(s) IV Push every 1 hour PRN Pre/post blood products, medications, blood draw, and to maintain line patency      PHYSICAL EXAM  Vital Signs Last 24 Hrs  T(C): 36.8 (2022 10:10), Max: 37.3 (2022 14:00)  T(F): 98.2 (2022 10:10), Max: 99.1 (2022 14:00)  HR: 90 (2022 08:41) (79 - 92)  BP: 83/46 (2022 08:41) (83/46 - 120/58)  BP(mean): 66 (2022 08:41) (66 - 69)  RR: 18 (2022 08:41) (16 - 18)  SpO2: 95% (2022 08:41) (95% - 98%)    Constitutional: NAD.   HEENT: NCAT, MMM, OP clear, EOMI, , no proptosis or lid retraction  Neck: no thyromegaly or palpable thyroid nodules   Respiratory: lungs CTAB.  Cardiovascular: regular rhythm, normal S1 and S2, no audible murmurs  GI: soft, NT/ND, no masses/HSM appreciated.  Neurology: no tremors  Skin: no visible rashes/lesions, R AKA   Psychiatric: AAO x 3, normal affect/mood.    LABS:                        8.2    12.86 )-----------( 346      ( 2022 07:26 )             26.4     01-13    132<L>  |  93<L>  |  15  ----------------------------<  242<H>  3.7   |  28  |  1.36<H>    Ca    8.0<L>      2022 07:26  Phos  3.3       Mg     2.0           PT/INR - ( 2022 10:17 )   PT: 14.8 sec;   INR: 1.25          PTT - ( 2022 10:17 )  PTT:26.2 sec    Thyroid Stimulating Hormone, Serum: 2.850 uIU/mL ( @ 08:25)  Thyroid Stimulating Hormone, Serum: 1.68 uIU/mL ( @ 17:00)      HbA1C:         Insulin Sliding Scale requirements X 24 Hours:      RADIOLOGY & ADDITIONAL TESTS:      Assessment and Plan:   · Assessment	  66M hx of CAD with CABG , CHF (EF 20-25%) with AICD, T2DM c/b neuropathy with hx of diabetic ulcer, HLD. HTN, COPD, diverticulitis, celiac disease, anxiety and depression, cholecystostomy, R hip replacement R TKA several years ago c/b recurrent PJI and revisions, most recently in 2020 by Dr. Castro (explant and abx spacer exchange), transferred here from [facility] for recurrent PJI for repeat spacer exchange and possible flap coverage with Dr. Vaughn/Dr. Bowen. Pt has been experiencing atraumatic worsening R knee pain x 1 month. Notes he went to a procedure center where his knee was aspirated 3 weeks ago. Pt was seen at [facility] yesterday where his knee was aspirated with 283k cell count. WBC 14, ESR 78, +, AVSS. Pt started on vancomycin + rifampin. 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 Now s/p Replacement, antibiotic spacer 2022. Also with hyperglycemia.  now s/p R AKA on vascular service     A1C: 9.0%  Weight: 97kg BMI 28  Cr/GFR: 0.9/104  EF: 20-25%     Problem/Plan - 1:  ·  Problem: Type 2 diabetes mellitus.   Patient to be NPO after midnight for revision of R AKA   ·  Plan: Please reduce lantus  to 25  units at night.  Please continue lispro moderate dose sliding scale four times daily with meals and at bedtime    Pt's fingerstick glucose goal is 100-180.    Will continue to monitor     For discharge, pt can continue..TBD    Pt can follow up at discharge with Dr Schmitt at scheduled appt. .  Will discuss case with Dr. Luevano    and update primary team.     Problem/Plan - 2:  ·  Problem: Hypothyroidism.   ·  Plan: TSH 4-5 past several years, not on therapy. 2021 - TSH 8.08, free T4 0.9; started on levothyroxine 25mcg daily.  continue levothyroxine 25mcg daily.  TSH:2.85  Ft4:0.941.

## 2022-01-13 NOTE — PROGRESS NOTE ADULT - SUBJECTIVE AND OBJECTIVE BOX
O/N: weight updated to 78kg  1/12: PT/OT rec Acute rehab pending closure, JOHN PAUL done evidence of vegetations on the valves or device lead , Vanc dose changed to 1250 q24 by ID (first dose midnight tonight)         ---------------------------------------------------------------------------  PLEASE CHECK WHEN PRESENT:  [ x ] Heart Failure  [  ] Acute  [  ] Acute on Chronic  [x  ] Chronic  -------------------------------------------------------------------  [  ]Diastolic [HFpEF]  [x  ]Systolic [HFrEF]  [  ]Combined [HFpEF & HFrEF]  [  ] afib  [x  ] hypertensive heart disease  [  ]Other:  -------------------------------------------------------------------  [ ] Respiratory failure  [ ] Acute cor pulmonale  [ ] Asthma/COPD Exacerbation  [ ] Pleural effusion  [ ] Aspiration pneumonia  -------------------------------------------------------------------  [  ]MOISES  [  ]ATN  [  ]Reneal Medullary Necrosis  [  ]Renal Cortical Necrosis  [  ]Other Pathological Lesions:    [  ]CKD 1  [  ]CKD 2  [  ]CKD 3  [  ]CKD 4  [  ]CKD 5  [  ]Other  -------------------------------------------------------------------  [ x ]Diabetes  [  ] Diabetic PVD Ulcer  [  ] Neuropathic ulcer to DM  [  ] Diabetes with Nephropathy  [  ] Osteomyelitis due to diabetes  --------------------------------------------------------------------  [  ]Malnutrition: See Nutrition Note  [  ]Cachexia  [  ]Other:   [  ]Supplement Ordered:  [  ]Morbid Obesity (BMI >=40]  ---------------------------------------------------------------------  [ ] Sepsis/severe sepsis/septic shock  [ ] UTI  [ ] Pneumonia  -----------------------------------------------------------------------  [ ] Acidosis/alkalosis  [ ] Fluid overload  [ ] Hypokalemia  [ ] Hyperkalemia  [ ] Hypomagnesemia  [ ] Hypophosphatemia  [ ] Hyperphosphatemia  ------------------------------------------------------------------------  [ ] Acute blood loss anemia  [ ] Post op blood loss anemia  [ ] Iron deficiency anemia  [ ] Anemia due to chronic disease  [ ] Hypercoagulable state  ----------------------------------------------------------------------  [ ] Cerebral infarction  [ ] Transient ischemia attack  [ ] Encephalopathy    A/P: 65yo M hx of CAD, CHF (EF 20-25%), DM, R TKA several years ago c/b recurrent PJI and revisions, 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   - picc placed for long term abx.   - abx duration pending john paul    DM:  -ISS  -Lantus 27, lispro 9 TID    Diet:   -Regular /NPO PM    Activity:   -PT/OT consult  - anticipate acute rehab placement when medically ready for discharge     DVTPPx: HSQ    Dispo:  pending closure of right above knee amputation on Friday 1/14 O/N: weight updated to 78kg  1/12: PT/OT rec Acute rehab pending closure, JOHN PAUL done evidence of vegetations on the valves or device lead , Vanc dose changed to 1250 q24 by ID (first dose midnight tonight)   :    SUBJECTIVE: Patient seen at bedside in no acute distress. Complaining of some R AKA pain. No CP, SOB, fevers, chills.      Vital Signs Last 24 Hrs  T(C): 36.7 (13 Jan 2022 04:59), Max: 37.3 (12 Jan 2022 14:00)  T(F): 98.1 (13 Jan 2022 04:59), Max: 99.1 (12 Jan 2022 14:00)  HR: 85 (13 Jan 2022 05:20) (79 - 92)  BP: 119/68 (13 Jan 2022 05:20) (91/43 - 120/58)  BP(mean): 69 (12 Jan 2022 17:18) (69 - 69)  RR: 18 (13 Jan 2022 05:20) (16 - 18)  SpO2: 96% (13 Jan 2022 05:20) (95% - 98%)    Physical Exam:  General: NAD  Pulmonary: nonlabored breathing, RA  Cardiovascular: NSR  Abdominal: soft, NT/ND  Vascular: wound was clean, dry and intact. healthy tissue, pink and well perfused. minimal bleeding, Xeroform, gauze, kerlix and ace wraps used to redress stump.  Lines/drains/tubes:    I&O's Summary    11 Jan 2022 07:01  -  12 Jan 2022 07:00  --------------------------------------------------------  IN: 180 mL / OUT: 1150 mL / NET: -970 mL    12 Jan 2022 07:01  -  13 Jan 2022 06:58  --------------------------------------------------------  IN: 1150 mL / OUT: 525 mL / NET: 625 mL        LABS:                        8.5    13.52 )-----------( 294      ( 11 Jan 2022 23:36 )             26.5     01-11    134<L>  |  92<L>  |  14  ----------------------------<  204<H>  3.7   |  30  |  1.14    Ca    8.2<L>      11 Jan 2022 23:37  Phos  3.3     01-11  Mg     1.7     01-11          CAPILLARY BLOOD GLUCOSE      POCT Blood Glucose.: 249 mg/dL (13 Jan 2022 06:40)  POCT Blood Glucose.: 246 mg/dL (12 Jan 2022 21:36)  POCT Blood Glucose.: 241 mg/dL (12 Jan 2022 16:51)  POCT Blood Glucose.: 103 mg/dL (12 Jan 2022 12:22)  POCT Blood Glucose.: 118 mg/dL (12 Jan 2022 08:53)        RADIOLOGY & ADDITIONAL STUDIES:        ---------------------------------------------------------------------------  PLEASE CHECK WHEN PRESENT:  [ x ] Heart Failure  [  ] Acute  [  ] Acute on Chronic  [x  ] Chronic  -------------------------------------------------------------------  [  ]Diastolic [HFpEF]  [x  ]Systolic [HFrEF]  [  ]Combined [HFpEF & HFrEF]  [  ] afib  [x  ] hypertensive heart disease  [  ]Other:  -------------------------------------------------------------------  [ ] Respiratory failure  [ ] Acute cor pulmonale  [ ] Asthma/COPD Exacerbation  [ ] Pleural effusion  [ ] Aspiration pneumonia  -------------------------------------------------------------------  [  ]MOISES  [  ]ATN  [  ]Reneal Medullary Necrosis  [  ]Renal Cortical Necrosis  [  ]Other Pathological Lesions:    [  ]CKD 1  [  ]CKD 2  [  ]CKD 3  [  ]CKD 4  [  ]CKD 5  [  ]Other  -------------------------------------------------------------------  [ x ]Diabetes  [  ] Diabetic PVD Ulcer  [  ] Neuropathic ulcer to DM  [  ] Diabetes with Nephropathy  [  ] Osteomyelitis due to diabetes  --------------------------------------------------------------------  [  ]Malnutrition: See Nutrition Note  [  ]Cachexia  [  ]Other:   [  ]Supplement Ordered:  [  ]Morbid Obesity (BMI >=40]  ---------------------------------------------------------------------  [ ] Sepsis/severe sepsis/septic shock  [ ] UTI  [ ] Pneumonia  -----------------------------------------------------------------------  [ ] Acidosis/alkalosis  [ ] Fluid overload  [ ] Hypokalemia  [ ] Hyperkalemia  [ ] Hypomagnesemia  [ ] Hypophosphatemia  [ ] Hyperphosphatemia  ------------------------------------------------------------------------  [ ] Acute blood loss anemia  [ ] Post op blood loss anemia  [ ] Iron deficiency anemia  [ ] Anemia due to chronic disease  [ ] Hypercoagulable state  ----------------------------------------------------------------------  [ ] Cerebral infarction  [ ] Transient ischemia attack  [ ] Encephalopathy    A/P: 65yo M hx of CAD, CHF (EF 20-25%), DM, R TKA several years ago c/b recurrent PJI and revisions, 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   - abx duratiion 4 weeks    DM:  -ISS  -Lantus 27, lispro 9 TID    Diet:   -Regular /NPO PM    Activity:   -PT/OT consult  - anticipate acute rehab placement when medically ready for discharge     DVTPPx: HSQ    Dispo:  pending closure of right above knee amputation on Friday 1/14
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
## 2022-01-13 NOTE — PROGRESS NOTE ADULT - SUBJECTIVE AND OBJECTIVE BOX
Pre-op Diagnosis: R AKA  Procedure: Right AKA revision and closure   Surgeon: Luis Antonio    INCOMPLETE    Consent:                          8.2    12.86 )-----------( 346      ( 13 Jan 2022 07:26 )             26.4     01-13    132<L>  |  93<L>  |  15  ----------------------------<  242<H>  3.7   |  28  |  1.36<H>    Ca    8.0<L>      13 Jan 2022 07:26  Phos  3.3     01-13  Mg     2.0     01-13            Type & Screen:          (*With most recent within 72hrs of OR)  CXR:  EKG:  UA:    COVID status/date: [ ]Negative/Date:  [ ]Positive/Date:     *With most recent within 48hrs of OR    Is patient on ACE/ARB? [ ]No [ ]Yes   *If yes, please hold any ACE/ARB the day of surgery    Is patient on Lantus at bedtime?  [ ]No [x ]Yes   *If yes, please half the dose the night before OR since patient will be NPO    Does patient have a contrast allergy? [ x]No [ ]Yes  *If yes, please pre-medicate per protocol    Is patient on anticoagulation? [ ]No [ ] Yes  *If yes, please discuss with team when to hold it    Is the patient Female and <56yo [ ]No [ ] Yes  If yes, pregnancy test must be documented in the chart    Is patient on dialysis? [ ]No [ ]Yes  *If yes, please obtain all labs including K level EARLY the day of surgery   *Also, will NOT require IVF past midnight    A/P: 66yMale pre-op for above procedure  1. NPO past midnight, except medications  2. IVF at midnight:   3. [ ] Blood on hold, Units: Pre-op Diagnosis: R AKA  Procedure: Right AKA revision and closure   Surgeon: Luis Antonio    INCOMPLETE    Consent:                          8.2    12.86 )-----------( 346      ( 13 Jan 2022 07:26 )             26.4     01-13    132<L>  |  93<L>  |  15  ----------------------------<  242<H>  3.7   |  28  |  1.36<H>    Ca    8.0<L>      13 Jan 2022 07:26  Phos  3.3     01-13  Mg     2.0     01-13            Type & Screen:    Orderer for AM labs      (*With most recent within 72hrs of OR)  CXR: completed  EKG: completed  UA: completed    COVID status/date: [ ]Negative/Date:  [ ]Positive/Date:     *With most recent within 48hrs of OR    Is patient on ACE/ARB? [x ]No [ ]Yes   *If yes, please hold any ACE/ARB the day of surgery    Is patient on Lantus at bedtime?  [ ]No [x ]Yes   *If yes, please half the dose the night before OR since patient will be NPO    Does patient have a contrast allergy? [ x]No [ ]Yes  *If yes, please pre-medicate per protocol    Is patient on anticoagulation? [x ]No [ ] Yes  *If yes, please discuss with team when to hold it    Is the patient Female and <54yo [x ]No [ ] Yes  If yes, pregnancy test must be documented in the chart    Is patient on dialysis? [ ]No [ ]Yes  *If yes, please obtain all labs including K level EARLY the day of surgery   *Also, will NOT require IVF past midnight    A/P: 66yMale pre-op for above procedure  1. NPO past midnight, except medications  2. IVF at midnight:   3. [ ] Blood on hold, Units:

## 2022-01-13 NOTE — PROGRESS NOTE ADULT - SUBJECTIVE AND OBJECTIVE BOX
INFECTIOUS DISEASES CONSULT FOLLOW-UP NOTE    INTERVAL HPI/OVERNIGHT EVENTS:  no event overnight  patient feels well  Cr went up to 1.36, JOHN PAUL neg for vegetation     ROS:   Constitutional, eyes, ENT, cardiovascular, respiratory, gastrointestinal, genitourinary, integumentary, neurological, psychiatric and heme/lymph are otherwise negative other than noted above       ANTIBIOTICS/RELEVANT:    MEDICATIONS  (STANDING):  atorvastatin 80 milliGRAM(s) Oral at bedtime  chlorhexidine 2% Cloths 1 Application(s) Topical <User Schedule>  DAPTOmycin IVPB 600 milliGRAM(s) IV Intermittent every 24 hours  dextrose 40% Gel 15 Gram(s) Oral once  dextrose 5%. 1000 milliLiter(s) (50 mL/Hr) IV Continuous <Continuous>  dextrose 5%. 1000 milliLiter(s) (100 mL/Hr) IV Continuous <Continuous>  dextrose 50% Injectable 25 Gram(s) IV Push once  dextrose 50% Injectable 12.5 Gram(s) IV Push once  dextrose 50% Injectable 25 Gram(s) IV Push once  DULoxetine 20 milliGRAM(s) Oral daily  furosemide    Tablet 40 milliGRAM(s) Oral daily  gabapentin 100 milliGRAM(s) Oral three times a day  glucagon  Injectable 1 milliGRAM(s) IntraMuscular once  heparin   Injectable 5000 Unit(s) SubCutaneous every 8 hours  insulin glargine Injectable (LANTUS) 27 Unit(s) SubCutaneous at bedtime  insulin lispro (ADMELOG) corrective regimen sliding scale   SubCutaneous Before meals and at bedtime  insulin lispro Injectable (ADMELOG) 9 Unit(s) SubCutaneous three times a day before meals  levothyroxine 25 MICROGram(s) Oral daily  pantoprazole    Tablet 40 milliGRAM(s) Oral before breakfast  povidone iodine 5% Nasal Swab 1 Application(s) Both Nostrils once  spironolactone 25 milliGRAM(s) Oral daily    MEDICATIONS  (PRN):  acetaminophen     Tablet .. 650 milliGRAM(s) Oral every 6 hours PRN Mild Pain (1 - 3)  ALPRAZolam 0.5 milliGRAM(s) Oral every 8 hours PRN anxiety  aluminum hydroxide/magnesium hydroxide/simethicone Suspension 30 milliLiter(s) Oral every 4 hours PRN Dyspepsia  cyclobenzaprine 5 milliGRAM(s) Oral three times a day PRN Muscle Spasm  HYDROmorphone  Injectable 0.5 milliGRAM(s) IV Push every 4 hours PRN Breakthrough pain  oxyCODONE    IR 5 milliGRAM(s) Oral every 4 hours PRN Moderate Pain (4 - 6)  oxyCODONE    IR 10 milliGRAM(s) Oral every 6 hours PRN Severe Pain (7 - 10)  sodium chloride 0.9% lock flush 10 milliLiter(s) IV Push every 1 hour PRN Pre/post blood products, medications, blood draw, and to maintain line patency        Vital Signs Last 24 Hrs  T(C): 36.3 (13 Jan 2022 14:01), Max: 36.9 (12 Jan 2022 18:59)  T(F): 97.3 (13 Jan 2022 14:01), Max: 98.5 (12 Jan 2022 18:59)  HR: 89 (13 Jan 2022 12:10) (79 - 90)  BP: 91/57 (13 Jan 2022 12:10) (83/46 - 120/58)  BP(mean): 66 (13 Jan 2022 08:41) (66 - 69)  RR: 18 (13 Jan 2022 12:10) (16 - 18)  SpO2: 96% (13 Jan 2022 12:10) (95% - 97%)    01-12-22 @ 07:01  -  01-13-22 @ 07:00  --------------------------------------------------------  IN: 1150 mL / OUT: 525 mL / NET: 625 mL    01-13-22 @ 07:01 - 01-13-22 @ 15:08  --------------------------------------------------------  IN: 720 mL / OUT: 400 mL / NET: 320 mL      PHYSICAL EXAM:  Constitutional: alert, NAD  Eyes: the sclera and conjunctiva were normal.   ENT: the ears and nose were normal in appearance.   Neck: the appearance of the neck was normal and the neck was supple.   Pulmonary: no respiratory distress and lungs were clear to auscultation bilaterally.   Heart: heart rate was normal and rhythm regular, normal S1 and S2  Vascular: s/p R AKA  Abdomen: normal bowel sounds, soft, non-tender  Neurological: no focal deficits.           LABS:                        8.2    12.86 )-----------( 346      ( 13 Jan 2022 07:26 )             26.4     01-13    132<L>  |  93<L>  |  15  ----------------------------<  242<H>  3.7   |  28  |  1.36<H>    Ca    8.0<L>      13 Jan 2022 07:26  Phos  3.3     01-13  Mg     2.0     01-13      PT/INR - ( 13 Jan 2022 10:17 )   PT: 14.8 sec;   INR: 1.25          PTT - ( 13 Jan 2022 10:17 )  PTT:26.2 sec      MICROBIOLOGY:  1/7 BCx ngtd  1/6 BCx ngtd  1/5 BCx MRSA  1/5 BCx MRSA  1/5 Synovial fluid: MRSA      RADIOLOGY & ADDITIONAL STUDIES:  JOHN PAUL 1/12  CONCLUSIONS:     1. Severely reduced left ventricular systolic function.   2. Normal right ventricular size.   3. Reduced right ventricular systolic function.   4. Dilated left atrium.   5. No LA/RA/VERONICA/RAA thrombus seen.   6. No evidence of an intracardiac shunt.   7. Mild mitral regurgitation.   8. Mild tricuspid regurgitation.   9. Noechocardiographic evidence of vegetations on the valves or device   lead .  10. No evidence of pulmonary hypertension.  11. No pericardial effusion.  12. Compared to the previous JOHN PAUL performed on 7/21/2020, there have been   no significant interval changes.      Gallium scan  Impression: No evidence of ICD infection is noted. There is activity on   the medial aspect of the right leg just above the knee which may be   related to patient's recent surgery.    Please note that gallium is insensitive to possible infection at the tip   of the ICD wires. Transesophageal echocardiography is pending.

## 2022-01-14 LAB
ALBUMIN SERPL ELPH-MCNC: 2.8 G/DL — LOW (ref 3.3–5)
ALP SERPL-CCNC: 104 U/L — SIGNIFICANT CHANGE UP (ref 40–120)
ALT FLD-CCNC: 25 U/L — SIGNIFICANT CHANGE UP (ref 10–45)
ANION GAP SERPL CALC-SCNC: 10 MMOL/L — SIGNIFICANT CHANGE UP (ref 5–17)
ANION GAP SERPL CALC-SCNC: 13 MMOL/L — SIGNIFICANT CHANGE UP (ref 5–17)
APTT BLD: 25.7 SEC — LOW (ref 27.5–35.5)
APTT BLD: 26.6 SEC — LOW (ref 27.5–35.5)
AST SERPL-CCNC: 31 U/L — SIGNIFICANT CHANGE UP (ref 10–40)
BASE EXCESS BLDA CALC-SCNC: 4.9 MMOL/L — HIGH (ref -2–3)
BASOPHILS # BLD AUTO: 0.07 K/UL — SIGNIFICANT CHANGE UP (ref 0–0.2)
BASOPHILS NFR BLD AUTO: 0.4 % — SIGNIFICANT CHANGE UP (ref 0–2)
BILIRUB SERPL-MCNC: 1.2 MG/DL — SIGNIFICANT CHANGE UP (ref 0.2–1.2)
BUN SERPL-MCNC: 15 MG/DL — SIGNIFICANT CHANGE UP (ref 7–23)
BUN SERPL-MCNC: 17 MG/DL — SIGNIFICANT CHANGE UP (ref 7–23)
CALCIUM SERPL-MCNC: 8.4 MG/DL — SIGNIFICANT CHANGE UP (ref 8.4–10.5)
CALCIUM SERPL-MCNC: 8.4 MG/DL — SIGNIFICANT CHANGE UP (ref 8.4–10.5)
CHLORIDE SERPL-SCNC: 94 MMOL/L — LOW (ref 96–108)
CHLORIDE SERPL-SCNC: 96 MMOL/L — SIGNIFICANT CHANGE UP (ref 96–108)
CK SERPL-CCNC: 133 U/L — SIGNIFICANT CHANGE UP (ref 30–200)
CO2 BLDA-SCNC: 29 MMOL/L — HIGH (ref 19–24)
CO2 SERPL-SCNC: 26 MMOL/L — SIGNIFICANT CHANGE UP (ref 22–31)
CO2 SERPL-SCNC: 30 MMOL/L — SIGNIFICANT CHANGE UP (ref 22–31)
CREAT SERPL-MCNC: 1.31 MG/DL — HIGH (ref 0.5–1.3)
CREAT SERPL-MCNC: 1.44 MG/DL — HIGH (ref 0.5–1.3)
EOSINOPHIL # BLD AUTO: 0.22 K/UL — SIGNIFICANT CHANGE UP (ref 0–0.5)
EOSINOPHIL NFR BLD AUTO: 1.3 % — SIGNIFICANT CHANGE UP (ref 0–6)
GLUCOSE BLDC GLUCOMTR-MCNC: 182 MG/DL — HIGH (ref 70–99)
GLUCOSE BLDC GLUCOMTR-MCNC: 238 MG/DL — HIGH (ref 70–99)
GLUCOSE BLDC GLUCOMTR-MCNC: 244 MG/DL — HIGH (ref 70–99)
GLUCOSE BLDC GLUCOMTR-MCNC: 247 MG/DL — HIGH (ref 70–99)
GLUCOSE SERPL-MCNC: 258 MG/DL — HIGH (ref 70–99)
GLUCOSE SERPL-MCNC: 308 MG/DL — HIGH (ref 70–99)
HCO3 BLDA-SCNC: 28 MMOL/L — SIGNIFICANT CHANGE UP (ref 21–28)
HCT VFR BLD CALC: 25.1 % — LOW (ref 39–50)
HCT VFR BLD CALC: 26.8 % — LOW (ref 39–50)
HGB BLD-MCNC: 7.5 G/DL — LOW (ref 13–17)
HGB BLD-MCNC: 8.6 G/DL — LOW (ref 13–17)
IMM GRANULOCYTES NFR BLD AUTO: 0.8 % — SIGNIFICANT CHANGE UP (ref 0–1.5)
INR BLD: 1.22 — HIGH (ref 0.88–1.16)
INR BLD: 1.26 — HIGH (ref 0.88–1.16)
ISTAT ARTERIAL BE: 5 MMOL/L — HIGH (ref -2–3)
ISTAT ARTERIAL GLUCOSE: 257 MG/DL — HIGH (ref 70–99)
ISTAT ARTERIAL HCO3: 30 MMOL/L — HIGH (ref 22–26)
ISTAT ARTERIAL HEMATOCRIT: 26 % — LOW (ref 39–50)
ISTAT ARTERIAL HEMOGLOBIN: 8.8 G/DL — LOW (ref 13–17)
ISTAT ARTERIAL IONIZED CALCIUM: 1.06 MMOL/L — LOW (ref 1.12–1.3)
ISTAT ARTERIAL PCO2: 44 MMHG — SIGNIFICANT CHANGE UP (ref 35–45)
ISTAT ARTERIAL PH: 7.43 — SIGNIFICANT CHANGE UP (ref 7.35–7.45)
ISTAT ARTERIAL PO2: 570 MMHG — HIGH (ref 80–105)
ISTAT ARTERIAL POTASSIUM: 3.1 MMOL/L — LOW (ref 3.5–5.3)
ISTAT ARTERIAL SO2: 100 % — HIGH (ref 95–98)
ISTAT ARTERIAL SODIUM: 136 MMOL/L — SIGNIFICANT CHANGE UP (ref 135–145)
ISTAT ARTERIAL TCO2: 31 MMOL/L — SIGNIFICANT CHANGE UP (ref 22–31)
LACTATE SERPL-SCNC: 1.3 MMOL/L — SIGNIFICANT CHANGE UP (ref 0.5–2)
LYMPHOCYTES # BLD AUTO: 0.67 K/UL — LOW (ref 1–3.3)
LYMPHOCYTES # BLD AUTO: 4 % — LOW (ref 13–44)
MAGNESIUM SERPL-MCNC: 1.6 MG/DL — SIGNIFICANT CHANGE UP (ref 1.6–2.6)
MAGNESIUM SERPL-MCNC: 1.8 MG/DL — SIGNIFICANT CHANGE UP (ref 1.6–2.6)
MCHC RBC-ENTMCNC: 24 PG — LOW (ref 27–34)
MCHC RBC-ENTMCNC: 25.2 PG — LOW (ref 27–34)
MCHC RBC-ENTMCNC: 29.9 GM/DL — LOW (ref 32–36)
MCHC RBC-ENTMCNC: 32.1 GM/DL — SIGNIFICANT CHANGE UP (ref 32–36)
MCV RBC AUTO: 78.6 FL — LOW (ref 80–100)
MCV RBC AUTO: 80.2 FL — SIGNIFICANT CHANGE UP (ref 80–100)
MONOCYTES # BLD AUTO: 0.99 K/UL — HIGH (ref 0–0.9)
MONOCYTES NFR BLD AUTO: 5.9 % — SIGNIFICANT CHANGE UP (ref 2–14)
NEUTROPHILS # BLD AUTO: 14.81 K/UL — HIGH (ref 1.8–7.4)
NEUTROPHILS NFR BLD AUTO: 87.6 % — HIGH (ref 43–77)
NRBC # BLD: 0 /100 WBCS — SIGNIFICANT CHANGE UP (ref 0–0)
NRBC # BLD: 0 /100 WBCS — SIGNIFICANT CHANGE UP (ref 0–0)
PCO2 BLDA: 36 MMHG — SIGNIFICANT CHANGE UP (ref 35–48)
PH BLDA: 7.5 — HIGH (ref 7.35–7.45)
PHOSPHATE SERPL-MCNC: 3.3 MG/DL — SIGNIFICANT CHANGE UP (ref 2.5–4.5)
PHOSPHATE SERPL-MCNC: 3.7 MG/DL — SIGNIFICANT CHANGE UP (ref 2.5–4.5)
PLATELET # BLD AUTO: 353 K/UL — SIGNIFICANT CHANGE UP (ref 150–400)
PLATELET # BLD AUTO: 370 K/UL — SIGNIFICANT CHANGE UP (ref 150–400)
PO2 BLDA: 245 MMHG — HIGH (ref 83–108)
POTASSIUM SERPL-MCNC: 3.2 MMOL/L — LOW (ref 3.5–5.3)
POTASSIUM SERPL-MCNC: 3.2 MMOL/L — LOW (ref 3.5–5.3)
POTASSIUM SERPL-SCNC: 3.2 MMOL/L — LOW (ref 3.5–5.3)
POTASSIUM SERPL-SCNC: 3.2 MMOL/L — LOW (ref 3.5–5.3)
PROT SERPL-MCNC: 6.5 G/DL — SIGNIFICANT CHANGE UP (ref 6–8.3)
PROTHROM AB SERPL-ACNC: 14.5 SEC — HIGH (ref 10.6–13.6)
PROTHROM AB SERPL-ACNC: 14.9 SEC — HIGH (ref 10.6–13.6)
RBC # BLD: 3.13 M/UL — LOW (ref 4.2–5.8)
RBC # BLD: 3.41 M/UL — LOW (ref 4.2–5.8)
RBC # FLD: 17.7 % — HIGH (ref 10.3–14.5)
RBC # FLD: 18 % — HIGH (ref 10.3–14.5)
SAO2 % BLDA: 99.3 % — HIGH (ref 94–98)
SODIUM SERPL-SCNC: 134 MMOL/L — LOW (ref 135–145)
SODIUM SERPL-SCNC: 135 MMOL/L — SIGNIFICANT CHANGE UP (ref 135–145)
TROPONIN T SERPL-MCNC: 0.03 NG/ML — HIGH (ref 0–0.01)
WBC # BLD: 13.11 K/UL — HIGH (ref 3.8–10.5)
WBC # BLD: 16.9 K/UL — HIGH (ref 3.8–10.5)
WBC # FLD AUTO: 13.11 K/UL — HIGH (ref 3.8–10.5)
WBC # FLD AUTO: 16.9 K/UL — HIGH (ref 3.8–10.5)

## 2022-01-14 PROCEDURE — 71045 X-RAY EXAM CHEST 1 VIEW: CPT | Mod: 26

## 2022-01-14 PROCEDURE — 99231 SBSQ HOSP IP/OBS SF/LOW 25: CPT | Mod: GC

## 2022-01-14 PROCEDURE — 99233 SBSQ HOSP IP/OBS HIGH 50: CPT

## 2022-01-14 PROCEDURE — 99232 SBSQ HOSP IP/OBS MODERATE 35: CPT

## 2022-01-14 RX ORDER — PANTOPRAZOLE SODIUM 20 MG/1
40 TABLET, DELAYED RELEASE ORAL
Refills: 0 | Status: DISCONTINUED | OUTPATIENT
Start: 2022-01-14 | End: 2022-01-27

## 2022-01-14 RX ORDER — CHLORHEXIDINE GLUCONATE 213 G/1000ML
15 SOLUTION TOPICAL EVERY 12 HOURS
Refills: 0 | Status: DISCONTINUED | OUTPATIENT
Start: 2022-01-14 | End: 2022-01-14

## 2022-01-14 RX ORDER — NOREPINEPHRINE BITARTRATE/D5W 8 MG/250ML
0.04 PLASTIC BAG, INJECTION (ML) INTRAVENOUS
Qty: 8 | Refills: 0 | Status: DISCONTINUED | OUTPATIENT
Start: 2022-01-14 | End: 2022-01-14

## 2022-01-14 RX ORDER — SENNA PLUS 8.6 MG/1
2 TABLET ORAL AT BEDTIME
Refills: 0 | Status: DISCONTINUED | OUTPATIENT
Start: 2022-01-14 | End: 2022-01-27

## 2022-01-14 RX ORDER — POTASSIUM CHLORIDE 20 MEQ
40 PACKET (EA) ORAL EVERY 4 HOURS
Refills: 0 | Status: COMPLETED | OUTPATIENT
Start: 2022-01-14 | End: 2022-01-14

## 2022-01-14 RX ORDER — INSULIN GLARGINE 100 [IU]/ML
32 INJECTION, SOLUTION SUBCUTANEOUS AT BEDTIME
Refills: 0 | Status: DISCONTINUED | OUTPATIENT
Start: 2022-01-14 | End: 2022-01-15

## 2022-01-14 RX ORDER — MAGNESIUM SULFATE 500 MG/ML
2 VIAL (ML) INJECTION ONCE
Refills: 0 | Status: COMPLETED | OUTPATIENT
Start: 2022-01-14 | End: 2022-01-14

## 2022-01-14 RX ORDER — FENTANYL CITRATE 50 UG/ML
0.5 INJECTION INTRAVENOUS
Qty: 2500 | Refills: 0 | Status: DISCONTINUED | OUTPATIENT
Start: 2022-01-14 | End: 2022-01-14

## 2022-01-14 RX ORDER — POTASSIUM CHLORIDE 20 MEQ
40 PACKET (EA) ORAL ONCE
Refills: 0 | Status: DISCONTINUED | OUTPATIENT
Start: 2022-01-14 | End: 2022-01-14

## 2022-01-14 RX ORDER — POTASSIUM CHLORIDE 20 MEQ
10 PACKET (EA) ORAL
Refills: 0 | Status: DISCONTINUED | OUTPATIENT
Start: 2022-01-14 | End: 2022-01-14

## 2022-01-14 RX ORDER — PANTOPRAZOLE SODIUM 20 MG/1
40 TABLET, DELAYED RELEASE ORAL DAILY
Refills: 0 | Status: DISCONTINUED | OUTPATIENT
Start: 2022-01-14 | End: 2022-01-14

## 2022-01-14 RX ORDER — PROPOFOL 10 MG/ML
10 INJECTION, EMULSION INTRAVENOUS
Qty: 1000 | Refills: 0 | Status: DISCONTINUED | OUTPATIENT
Start: 2022-01-14 | End: 2022-01-14

## 2022-01-14 RX ORDER — LEVOTHYROXINE SODIUM 125 MCG
25 TABLET ORAL DAILY
Refills: 0 | Status: DISCONTINUED | OUTPATIENT
Start: 2022-01-14 | End: 2022-01-27

## 2022-01-14 RX ADMIN — GABAPENTIN 100 MILLIGRAM(S): 400 CAPSULE ORAL at 15:12

## 2022-01-14 RX ADMIN — HEPARIN SODIUM 5000 UNIT(S): 5000 INJECTION INTRAVENOUS; SUBCUTANEOUS at 15:13

## 2022-01-14 RX ADMIN — HYDROMORPHONE HYDROCHLORIDE 0.5 MILLIGRAM(S): 2 INJECTION INTRAMUSCULAR; INTRAVENOUS; SUBCUTANEOUS at 21:33

## 2022-01-14 RX ADMIN — GABAPENTIN 100 MILLIGRAM(S): 400 CAPSULE ORAL at 05:59

## 2022-01-14 RX ADMIN — HEPARIN SODIUM 5000 UNIT(S): 5000 INJECTION INTRAVENOUS; SUBCUTANEOUS at 05:59

## 2022-01-14 RX ADMIN — Medication 4: at 06:10

## 2022-01-14 RX ADMIN — Medication 40 MILLIGRAM(S): at 05:59

## 2022-01-14 RX ADMIN — HEPARIN SODIUM 5000 UNIT(S): 5000 INJECTION INTRAVENOUS; SUBCUTANEOUS at 21:18

## 2022-01-14 RX ADMIN — Medication 1000 MILLIGRAM(S): at 09:51

## 2022-01-14 RX ADMIN — SPIRONOLACTONE 25 MILLIGRAM(S): 25 TABLET, FILM COATED ORAL at 05:59

## 2022-01-14 RX ADMIN — PANTOPRAZOLE SODIUM 40 MILLIGRAM(S): 20 TABLET, DELAYED RELEASE ORAL at 05:59

## 2022-01-14 RX ADMIN — Medication 25 GRAM(S): at 15:15

## 2022-01-14 RX ADMIN — GABAPENTIN 100 MILLIGRAM(S): 400 CAPSULE ORAL at 21:18

## 2022-01-14 RX ADMIN — HYDROMORPHONE HYDROCHLORIDE 0.5 MILLIGRAM(S): 2 INJECTION INTRAMUSCULAR; INTRAVENOUS; SUBCUTANEOUS at 21:18

## 2022-01-14 RX ADMIN — Medication 40 MILLIEQUIVALENT(S): at 17:16

## 2022-01-14 RX ADMIN — Medication 25 MICROGRAM(S): at 05:59

## 2022-01-14 RX ADMIN — Medication 9 UNIT(S): at 16:45

## 2022-01-14 RX ADMIN — Medication 4: at 10:54

## 2022-01-14 RX ADMIN — Medication 4: at 23:02

## 2022-01-14 RX ADMIN — ATORVASTATIN CALCIUM 80 MILLIGRAM(S): 80 TABLET, FILM COATED ORAL at 21:18

## 2022-01-14 RX ADMIN — DAPTOMYCIN 124 MILLIGRAM(S): 500 INJECTION, POWDER, LYOPHILIZED, FOR SOLUTION INTRAVENOUS at 18:25

## 2022-01-14 RX ADMIN — PANTOPRAZOLE SODIUM 40 MILLIGRAM(S): 20 TABLET, DELAYED RELEASE ORAL at 11:16

## 2022-01-14 RX ADMIN — OXYCODONE HYDROCHLORIDE 10 MILLIGRAM(S): 5 TABLET ORAL at 01:00

## 2022-01-14 RX ADMIN — INSULIN GLARGINE 32 UNIT(S): 100 INJECTION, SOLUTION SUBCUTANEOUS at 23:01

## 2022-01-14 RX ADMIN — HYDROMORPHONE HYDROCHLORIDE 0.5 MILLIGRAM(S): 2 INJECTION INTRAMUSCULAR; INTRAVENOUS; SUBCUTANEOUS at 10:40

## 2022-01-14 RX ADMIN — CHLORHEXIDINE GLUCONATE 1 APPLICATION(S): 213 SOLUTION TOPICAL at 06:07

## 2022-01-14 RX ADMIN — Medication 100 MILLIEQUIVALENT(S): at 11:16

## 2022-01-14 RX ADMIN — Medication 2: at 16:46

## 2022-01-14 RX ADMIN — Medication 40 MILLIEQUIVALENT(S): at 15:12

## 2022-01-14 NOTE — PROGRESS NOTE ADULT - ASSESSMENT
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   Initially underwent antibiotic cement spacer exchange, I&D and revision gastroc flap by PRS on 1/6.  Bacteremia cleared since 1/6.  Patient already had multiple recurrence and he had flank pus in his R knee.  Now s/p AKA on 1/10 - source control achieved.   Gallium scan negative for ICD infection and JOHN PAUL neg for vegetation on valve and leads.  Had hypotensive episode in OR and AKA closure postponed, likely due to volume depletion per SICU team.  Plan for AKA closure next week.    - cont daptomycin 600mg IV q24h  - Duration of antibiotics is 4 weeks from the last surgery to treat MRSA bacteremia   - Weekly labs: CMP, CBC, ESR, CRP, CK faxed to ID office at [phone]  - Patient to follow up with Dr. Casillas in 2 weeks ([address] [place] [address], [phone]), ID office will call patient to schedule     Team 2 will follow you.  Case d/w primary team.    Marta Ruffin MD, MS  Infectious Disease attending  work cell [phone]   For any questions during evening/weekend/holiday, please page ID on call

## 2022-01-14 NOTE — PROGRESS NOTE ADULT - SUBJECTIVE AND OBJECTIVE BOX
INFECTIOUS DISEASES CONSULT FOLLOW-UP NOTE    INTERVAL HPI/OVERNIGHT EVENTS:  No event overnight  patient was supposed to have closure of AKA site however became hypotensive after anesthesia  got levophed, surgery aborted and was transferred to SICU  when I saw him, he was back to his baseline, feels well, drinking [employer]     ROS:   Constitutional, eyes, ENT, cardiovascular, respiratory, gastrointestinal, genitourinary, integumentary, neurological, psychiatric and heme/lymph are otherwise negative other than noted above       ANTIBIOTICS/RELEVANT:    MEDICATIONS  (STANDING):  atorvastatin 80 milliGRAM(s) Oral at bedtime  chlorhexidine 2% Cloths 1 Application(s) Topical <User Schedule>  DAPTOmycin IVPB 600 milliGRAM(s) IV Intermittent every 24 hours  dextrose 40% Gel 15 Gram(s) Oral once  dextrose 5%. 1000 milliLiter(s) (50 mL/Hr) IV Continuous <Continuous>  dextrose 5%. 1000 milliLiter(s) (100 mL/Hr) IV Continuous <Continuous>  dextrose 50% Injectable 25 Gram(s) IV Push once  dextrose 50% Injectable 12.5 Gram(s) IV Push once  dextrose 50% Injectable 25 Gram(s) IV Push once  DULoxetine 20 milliGRAM(s) Oral daily  gabapentin 100 milliGRAM(s) Oral three times a day  glucagon  Injectable 1 milliGRAM(s) IntraMuscular once  heparin   Injectable 5000 Unit(s) SubCutaneous every 8 hours  insulin glargine Injectable (LANTUS) 25 Unit(s) SubCutaneous at bedtime  insulin lispro (ADMELOG) corrective regimen sliding scale   SubCutaneous Before meals and at bedtime  insulin lispro Injectable (ADMELOG) 9 Unit(s) SubCutaneous three times a day before meals  levothyroxine 25 MICROGram(s) Oral daily  magnesium sulfate  IVPB 2 Gram(s) IV Intermittent once  pantoprazole    Tablet 40 milliGRAM(s) Oral before breakfast  potassium chloride    Tablet ER 40 milliEquivalent(s) Oral every 4 hours    MEDICATIONS  (PRN):  HYDROmorphone  Injectable 0.5 milliGRAM(s) IV Push every 4 hours PRN Breakthrough pain  sodium chloride 0.9% lock flush 10 milliLiter(s) IV Push every 1 hour PRN Pre/post blood products, medications, blood draw, and to maintain line patency        Vital Signs Last 24 Hrs  T(C): 35.9 (14 Jan 2022 10:00), Max: 36.5 (13 Jan 2022 22:25)  T(F): 96.6 (14 Jan 2022 10:00), Max: 97.7 (13 Jan 2022 22:25)  HR: 75 (14 Jan 2022 13:00) (65 - 93)  BP: 104/52 (14 Jan 2022 13:00) (104/52 - 152/67)  BP(mean): 71 (14 Jan 2022 13:00) (71 - 97)  RR: 12 (14 Jan 2022 13:00) (12 - 20)  SpO2: 100% (14 Jan 2022 13:00) (96% - 100%)    01-13-22 @ 07:01  -  01-14-22 @ 07:00  --------------------------------------------------------  IN: 720 mL / OUT: 1500 mL / NET: -780 mL    01-14-22 @ 07:01  -  01-14-22 @ 14:11  --------------------------------------------------------  IN: 115.2 mL / OUT: 850 mL / NET: -734.8 mL      PHYSICAL EXAM:  Constitutional: alert, NAD  Eyes: the sclera and conjunctiva were normal.   ENT: the ears and nose were normal in appearance.   Neck: the appearance of the neck was normal and the neck was supple.   Pulmonary: no respiratory distress and lungs were clear to auscultation bilaterally.   Heart: heart rate was normal and rhythm regular, normal S1 and S2  Ext: R AKA   Abdomen: normal bowel sounds, soft, non-tender  Neurological: no focal deficits.           LABS:                        8.6    16.90 )-----------( 353      ( 14 Jan 2022 10:22 )             26.8     01-14    135  |  96  |  15  ----------------------------<  308<H>  3.2<L>   |  26  |  1.31<H>    Ca    8.4      14 Jan 2022 10:22  Phos  3.7     01-14  Mg     1.6     01-14    TPro  6.5  /  Alb  2.8<L>  /  TBili  1.2  /  DBili  x   /  AST  31  /  ALT  25  /  AlkPhos  104  01-14    PT/INR - ( 14 Jan 2022 10:22 )   PT: 14.9 sec;   INR: 1.26          PTT - ( 14 Jan 2022 10:22 )  PTT:25.7 sec      MICROBIOLOGY:  1/7 BCx ngtd  1/6 BCx ngtd  1/5 BCx MRSA  1/5 BCx MRSA  1/5 Synovial fluid: MRSA      RADIOLOGY & ADDITIONAL STUDIES:  JOHN PAUL 1/12  CONCLUSIONS:     1. Severely reduced left ventricular systolic function.   2. Normal right ventricular size.   3. Reduced right ventricular systolic function.   4. Dilated left atrium.   5. No LA/RA/VERONICA/RAA thrombus seen.   6. No evidence of an intracardiac shunt.   7. Mild mitral regurgitation.   8. Mild tricuspid regurgitation.   9. Noechocardiographic evidence of vegetations on the valves or device   lead .  10. No evidence of pulmonary hypertension.  11. No pericardial effusion.  12. Compared to the previous JOHN PAUL performed on 7/21/2020, there have been   no significant interval changes.      Gallium scan  Impression: No evidence of ICD infection is noted. There is activity on   the medial aspect of the right leg just above the knee which may be   related to patient's recent surgery.    Please note that gallium is insensitive to possible infection at the tip   of the ICD wires. Transesophageal echocardiography is pending.

## 2022-01-14 NOTE — CONSULT NOTE ADULT - TIME BILLING
Patient seen and examined with house-staff during bedside rounds.  Resident note read, including vitals, physical findings, laboratory data, and radiological reports.   Revisions included below.  Direct personal management at bed side and extensive interpretation of the data.  Plan was outlined and discussed in details with the housestaff.  Decision making of high complexity  Action taken for acute disease activity to reflect the level of care provided:  - medication reconciliation  - review laboratory data
-Hypotension - multifactorial including perioperative anesthesia/intubation, hypovolemia i/s/o of advanced cardiomyopathy. Surgery aborted, pt. now s/p extubation and weaned off pressors. currently low 100s/60s - c/w patient's baseline blood pressure; Recommend holding further diuresis over the weekend, encourage PO intake. No evidence of ACS or decompensated HF.  -sCHF - well compensated, euvolemic to dry on exam, otherwise WWP w/ good mentation; Recommend holding lasix and spironolactone. Would consider Lopressor 12.5 PO BID to start over the weekend  -Will continue to follow    Kvng Dumont MD  Cardiology Attending
Uncontrolled DM/hypothyroidism

## 2022-01-14 NOTE — CONSULT NOTE ADULT - SUBJECTIVE AND OBJECTIVE BOX
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 Castro (explant and abx spacer exchange), transferred here on 1/6 from [facility] for recurrent PJI for repeat spacer exchange and possible flap coverage with Dr. Vaughn/Dr. Bowen. Pt had atraumatic worsening R knee pain x 1 month. Notes he went to a procedure center where his knee was aspirated 3 weeks ago. Pt was seen at [facility] 1/5 where his knee was aspirated with 283k cell count. WBC 14, ESR 78, +, AVSS. Pt started on vancomycin + rifampin at [facility]. 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    since admission here, pt was consulted by ID and vascular, taken to OR with vascular on 1/10 for right AKA. Bld cx with MRSA, switched over to daptomycin, had JOHN PAUL with no vegetation. pt was taken to OR today with vascular for completion of AKA, but found to be hypotensive intraop, so procedure was aborted and pt transferred to SICU post-op.       PMH:     MEDS:  Allergies    ACE inhibitors (Hives)  carvedilol (Other)  enalapril (Hives)  Entresto (Other)    Intolerances        ICU Vital Signs Last 24 Hrs  T(F): 97.3 (01-14-22 @ 06:23), Max: 98.2 (01-13-22 @ 10:10)  HR: 84 (01-14-22 @ 06:23) (80 - 93)  BP: 115/61 (01-14-22 @ 06:23) (83/46 - 149/58)  BP(mean): 66 (01-13-22 @ 08:41) (66 - 66)  ABP: --  RR: 19 (01-14-22 @ 06:23) (18 - 20)  SpO2: 97% (01-14-22 @ 06:23) (95% - 97%)    PHYSICAL EXAM:   Neurological: AAOx3, CNII-XII intact,  strength 5/5 b/l  ENT: mucus membrane moist  Cardiovascular: RRR  Respiratory: CTA  Gastrointestinal: soft, NT, ND, BS+  Extremities: warm, no dependent edema  Vascular: no cyanosis/erythema  Skin: no rashes  MSK: no joint swelling.   LABS:    01-14    134<L>  |  94<L>  |  17  ----------------------------<  258<H>  3.2<L>   |  30  |  1.44<H>    Ca    8.4      14 Jan 2022 06:48  Phos  3.3     01-14  Mg     1.8     01-14                          7.5    13.11 )-----------( 370      ( 14 Jan 2022 06:48 )             25.1   PT/INR - ( 14 Jan 2022 06:48 )   PT: 14.5 sec;   INR: 1.22          PTT - ( 14 Jan 2022 06:48 )  PTT:26.6 sec  CAPILLARY BLOOD GLUCOSE      POCT Blood Glucose.: 244 mg/dL (14 Jan 2022 05:48)  POCT Blood Glucose.: 295 mg/dL (13 Jan 2022 21:53)  POCT Blood Glucose.: 150 mg/dL (13 Jan 2022 17:47)  POCT Blood Glucose.: 124 mg/dL (13 Jan 2022 11:58)    Loja:	  [ ] None	[ ] Daily Loja Order Placed	   Indication:	  [ ] Strict I and O's    [ ] Obstruction     [ ] Incontinence + Stage 3 or 4 Decubitus  Central Line:  [ ] None	   [ ]  Medication / TPN Administration     [ ] No Peripheral IV          HPI: 66yMale Hx CAD, CHF (EF 15-20%), DM, 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 Castro (explant and abx spacer exchange), transferred here on 1/6 from [facility] for recurrent PJI for repeat spacer exchange and possible flap coverage with Dr. Vaughn/Dr. Bowen. Pt had atraumatic worsening R knee pain x 1 month. Notes he went to a procedure center where his knee was aspirated 3 weeks ago. Pt was seen at [facility] 1/5 where his knee was aspirated with 283k cell count. WBC 14, ESR 78, +, AVSS. Pt started on vancomycin + rifampin at [facility]. 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      since admission here, pt was consulted by ID and vascular, taken to OR with vascular on 1/10 for right AKA. Bld cx with MRSA, switched over to daptomycin, had JOHN PAUL with no vegetation. pt was taken to OR today with vascular for completion of AKA, but found to be hypotensive intraop, so procedure was aborted and pt transferred to SICU post-op.       PMH:     MEDS:  Allergies    ACE inhibitors (Hives)  carvedilol (Other)  enalapril (Hives)  Entresto (Other)    Intolerances        ICU Vital Signs Last 24 Hrs  T(F): 97.3 (01-14-22 @ 06:23), Max: 98.2 (01-13-22 @ 10:10)  HR: 84 (01-14-22 @ 06:23) (80 - 93)  BP: 115/61 (01-14-22 @ 06:23) (83/46 - 149/58)  BP(mean): 66 (01-13-22 @ 08:41) (66 - 66)  ABP: --  RR: 19 (01-14-22 @ 06:23) (18 - 20)  SpO2: 97% (01-14-22 @ 06:23) (95% - 97%)    PHYSICAL EXAM:   Neurological: AAOx3, CNII-XII intact,  strength 5/5 b/l  ENT: mucus membrane moist  Cardiovascular: RRR  Respiratory: CTA  Gastrointestinal: soft, NT, ND, BS+  Extremities: warm, no dependent edema  Vascular: no cyanosis/erythema  Skin: no rashes  MSK: no joint swelling.   LABS:    01-14    134<L>  |  94<L>  |  17  ----------------------------<  258<H>  3.2<L>   |  30  |  1.44<H>    Ca    8.4      14 Jan 2022 06:48  Phos  3.3     01-14  Mg     1.8     01-14                          7.5    13.11 )-----------( 370      ( 14 Jan 2022 06:48 )             25.1   PT/INR - ( 14 Jan 2022 06:48 )   PT: 14.5 sec;   INR: 1.22          PTT - ( 14 Jan 2022 06:48 )  PTT:26.6 sec  CAPILLARY BLOOD GLUCOSE      POCT Blood Glucose.: 244 mg/dL (14 Jan 2022 05:48)  POCT Blood Glucose.: 295 mg/dL (13 Jan 2022 21:53)  POCT Blood Glucose.: 150 mg/dL (13 Jan 2022 17:47)  POCT Blood Glucose.: 124 mg/dL (13 Jan 2022 11:58)    Loja:	  [ ] None	[ ] Daily Loja Order Placed	   Indication:	  [ ] Strict I and O's    [ ] Obstruction     [ ] Incontinence + Stage 3 or 4 Decubitus  Central Line:  [ ] None	   [ ]  Medication / TPN Administration     [ ] No Peripheral IV          HPI: 66yMale Hx CAD, CHF (EF 15-20%), DM, 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 Castro (explant and abx spacer exchange), transferred here on 1/6 from [facility] for recurrent PJI for repeat spacer exchange and possible flap coverage with Dr. Vaughn/Dr. Bowen. Pt had atraumatic worsening R knee pain x 1 month. Notes he went to a procedure center where his knee was aspirated 3 weeks ago. Pt was seen at [facility] 1/5 where his knee was aspirated with 283k cell count. WBC 14, ESR 78, +, AVSS. Pt started on vancomycin + rifampin at [facility]. 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      since admission here, pt was consulted by ID and vascular, taken to OR with vascular on 1/10 for right AKA. Bld cx with MRSA, switched over to daptomycin, had JOHN PAUL with no vegetation. pt was taken to OR today with vascular for completion of AKA, but found to be hypotensive intraop, so procedure was aborted and pt transferred to SICU post-op.       PMH:     MEDS:  Allergies    ACE inhibitors (Hives)  carvedilol (Other)  enalapril (Hives)  Entresto (Other)    Intolerances        ICU Vital Signs Last 24 Hrs  T(F): 97.3 (01-14-22 @ 06:23), Max: 98.2 (01-13-22 @ 10:10)  HR: 84 (01-14-22 @ 06:23) (80 - 93)  BP: 115/61 (01-14-22 @ 06:23) (83/46 - 149/58)  BP(mean): 66 (01-13-22 @ 08:41) (66 - 66)  ABP: --  RR: 19 (01-14-22 @ 06:23) (18 - 20)  SpO2: 97% (01-14-22 @ 06:23) (95% - 97%)    PHYSICAL EXAM:   Neurological: AAOx3, CNII-XII intact,  strength 5/5 b/l  ENT: mucus membrane moist  Cardiovascular: RRR  Respiratory: CTA  Gastrointestinal: soft, NT, ND, BS+  Extremities: warm, no dependent edema  Vascular: no cyanosis/erythema  Skin: no rashes  MSK: no joint swelling.   LABS:    01-14    134<L>  |  94<L>  |  17  ----------------------------<  258<H>  3.2<L>   |  30  |  1.44<H>    Ca    8.4      14 Jan 2022 06:48  Phos  3.3     01-14  Mg     1.8     01-14                          7.5    13.11 )-----------( 370      ( 14 Jan 2022 06:48 )             25.1   PT/INR - ( 14 Jan 2022 06:48 )   PT: 14.5 sec;   INR: 1.22          PTT - ( 14 Jan 2022 06:48 )  PTT:26.6 sec  CAPILLARY BLOOD GLUCOSE      POCT Blood Glucose.: 244 mg/dL (14 Jan 2022 05:48)  POCT Blood Glucose.: 295 mg/dL (13 Jan 2022 21:53)  POCT Blood Glucose.: 150 mg/dL (13 Jan 2022 17:47)  POCT Blood Glucose.: 124 mg/dL (13 Jan 2022 11:58)    Loja:	  [ ] None	[ ] Daily Loja Order Placed	   Indication:	  [ ] Strict I and O's    [ ] Obstruction     [ ] Incontinence + Stage 3 or 4 Decubitus  Central Line:  [ ] None	   [ ]  Medication / TPN Administration     [ ] No Peripheral IV          HPI: 66yMale Hx CAD, CHF (EF 15-20%), DM, 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 Castro (explant and abx spacer exchange), transferred here on 1/6 from [facility] for recurrent PJI for repeat spacer exchange and possible flap coverage with Dr. Vaughn/Dr. Bowen. Pt had atraumatic worsening R knee pain x 1 month. Notes he went to a procedure center where his knee was aspirated 3 weeks ago. Pt was seen at [facility] 1/5 where his knee was aspirated with 283k cell count. WBC 14, ESR 78, +, AVSS. Pt started on vancomycin + rifampin at [facility]. 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      since admission here, pt was consulted by ID and vascular, taken to OR with vascular on 1/10 for right AKA. Bld cx with MRSA, switched from vanco  to daptomycin, had JOHN PAUL with no vegetation, gallium negative. pt was taken to OR today with vascular for completion AKA, but found to be hypotensive intraop, so procedure was aborted and pt transferred to SICU post-op.       PMH:     MEDS:  Allergies    ACE inhibitors (Hives)  carvedilol (Other)  enalapril (Hives)  Entresto (Other)    Intolerances        ICU Vital Signs Last 24 Hrs  T(F): 97.3 (01-14-22 @ 06:23), Max: 98.2 (01-13-22 @ 10:10)  HR: 84 (01-14-22 @ 06:23) (80 - 93)  BP: 115/61 (01-14-22 @ 06:23) (83/46 - 149/58)  BP(mean): 66 (01-13-22 @ 08:41) (66 - 66)  ABP: --  RR: 19 (01-14-22 @ 06:23) (18 - 20)  SpO2: 97% (01-14-22 @ 06:23) (95% - 97%)    PHYSICAL EXAM:   Neurological: AAOx3, CNII-XII intact,  strength 5/5 b/l  ENT: mucus membrane moist  Cardiovascular: RRR  Respiratory: CTA  Gastrointestinal: soft, NT, ND, BS+  Extremities: warm, no dependent edema  Vascular: no cyanosis/erythema  Skin: no rashes  MSK: no joint swelling.   LABS:    01-14    134<L>  |  94<L>  |  17  ----------------------------<  258<H>  3.2<L>   |  30  |  1.44<H>    Ca    8.4      14 Jan 2022 06:48  Phos  3.3     01-14  Mg     1.8     01-14                          7.5    13.11 )-----------( 370      ( 14 Jan 2022 06:48 )             25.1   PT/INR - ( 14 Jan 2022 06:48 )   PT: 14.5 sec;   INR: 1.22          PTT - ( 14 Jan 2022 06:48 )  PTT:26.6 sec  CAPILLARY BLOOD GLUCOSE      POCT Blood Glucose.: 244 mg/dL (14 Jan 2022 05:48)  POCT Blood Glucose.: 295 mg/dL (13 Jan 2022 21:53)  POCT Blood Glucose.: 150 mg/dL (13 Jan 2022 17:47)  POCT Blood Glucose.: 124 mg/dL (13 Jan 2022 11:58)    Loja:	  [ ] None	[ ] Daily Loja Order Placed	   Indication:	  [ ] Strict I and O's    [ ] Obstruction     [ ] Incontinence + Stage 3 or 4 Decubitus  Central Line:  [ ] None	   [ ]  Medication / TPN Administration     [ ] No Peripheral IV          HPI: 66yMale Hx CAD, CHF (EF 15-20%), DM, 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 Castro (explant and abx spacer exchange), transferred here on 1/6 from [facility] for MRSA bacteremia due to recurrent PJI for repeat spacer exchange and possible flap coverage with Dr. Vaughn/Dr. Bowen. Pt had atraumatic worsening R knee pain x 1 month. Notes he went to a procedure center where his knee was aspirated 3 weeks ago. Pt was seen at [facility] 1/5 where his knee was aspirated with 283k cell count. WBC 14, ESR 78, +, AVSS. Pt started on vancomycin + rifampin at [facility]. 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     Since admission here, pt was consulted by ID and vascular, taken to OR with vascular on 1/10 for right AKA. Bld cx with MRSA, switched from vanco  to daptomycin, had JOHN PAUL with no vegetation, gallium negative. pt was taken to OR today with vascular for completion AKA, but found to be hypotensive intraop, so procedure was aborted and pt transferred to SICU post-op.       PMH: as above     MEDS (current):  MEDICATIONS  (STANDING):  atorvastatin 80 milliGRAM(s) Oral at bedtime  chlorhexidine 2% Cloths 1 Application(s) Topical <User Schedule>  DAPTOmycin IVPB 600 milliGRAM(s) IV Intermittent every 24 hours  DULoxetine 20 milliGRAM(s) Oral daily  fentaNYL   Infusion 0.5 MICROgram(s)/kG/Hr (3.75 mL/Hr) IV Continuous <Continuous>  furosemide    Tablet 40 milliGRAM(s) Oral daily  gabapentin 100 milliGRAM(s) Oral three times a day  heparin   Injectable 5000 Unit(s) SubCutaneous every 8 hours  insulin glargine Injectable (LANTUS) 25 Unit(s) SubCutaneous at bedtime  insulin lispro (ADMELOG) corrective regimen sliding scale   SubCutaneous Before meals and at bedtime  insulin lispro Injectable (ADMELOG) 9 Unit(s) SubCutaneous three times a day before meals  levothyroxine 25 MICROGram(s) Oral daily  norepinephrine Infusion 0.04 MICROgram(s)/kG/Min (5.63 mL/Hr) IV Continuous <Continuous>  pantoprazole    Tablet 40 milliGRAM(s) Oral before breakfast  spironolactone 25 milliGRAM(s) Oral daily    MEDICATIONS  (PRN):  aluminum hydroxide/magnesium hydroxide/simethicone Suspension 30 milliLiter(s) Oral every 4 hours PRN Dyspepsia  cyclobenzaprine 5 milliGRAM(s) Oral three times a day PRN Muscle Spasm  HYDROmorphone  Injectable 0.5 milliGRAM(s) IV Push every 4 hours PRN Breakthrough pain  sodium chloride 0.9% lock flush 10 milliLiter(s) IV Push every 1 hour PRN Pre/post blood products, medications, blood draw, and to maintain line patency      Allergies: ACE inhibitors (Hives)  carvedilol (Other)  enalapril (Hives)  Entresto (Other)    Intolerances        ICU Vital Signs Last 24 Hrs  T(F): 97.3 (01-14-22 @ 06:23), Max: 98.2 (01-13-22 @ 10:10)  HR: 84 (01-14-22 @ 06:23) (80 - 93)  BP: 115/61 (01-14-22 @ 06:23) (83/46 - 149/58)  BP(mean): 66 (01-13-22 @ 08:41) (66 - 66)  ABP: --  RR: 19 (01-14-22 @ 06:23) (18 - 20)  SpO2: 97% (01-14-22 @ 06:23) (95% - 97%)    PHYSICAL EXAM:   Neurological: AAOx3, CNII-XII intact,  strength 5/5 b/l  ENT: mucus membrane moist  Cardiovascular: RRR  Respiratory: CTA  Gastrointestinal: soft, NT, ND, BS+  Extremities: warm, no dependent edema  Vascular: no cyanosis/erythema  Skin: no rashes  MSK: no joint swelling.     LABS:    01-14    134<L>  |  94<L>  |  17  ----------------------------<  258<H>  3.2<L>   |  30  |  1.44<H>    Ca    8.4      14 Jan 2022 06:48  Phos  3.3     01-14  Mg     1.8     01-14                          7.5    13.11 )-----------( 370      ( 14 Jan 2022 06:48 )             25.1   PT/INR - ( 14 Jan 2022 06:48 )   PT: 14.5 sec;   INR: 1.22          PTT - ( 14 Jan 2022 06:48 )  PTT:26.6 sec  CAPILLARY BLOOD GLUCOSE      POCT Blood Glucose.: 244 mg/dL (14 Jan 2022 05:48)  POCT Blood Glucose.: 295 mg/dL (13 Jan 2022 21:53)  POCT Blood Glucose.: 150 mg/dL (13 Jan 2022 17:47)  POCT Blood Glucose.: 124 mg/dL (13 Jan 2022 11:58)    Loja:	  [ ] None	[x ] Daily Loja Order Placed	   Indication:	  [ x] Strict I and O's    [ ] Obstruction     [ ] Incontinence + Stage 3 or 4 Decubitus  Central Line:  [x ] None	   [ ]  Medication / TPN Administration     [ ] No Peripheral IV          HPI: 66yMale Hx CAD, CHF (EF 15-20%), DM, 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 Castro (explant and abx spacer exchange), transferred here on 1/6 from [facility] for MRSA bacteremia due to recurrent PJI for repeat spacer exchange and possible flap coverage with Dr. Vaughn/Dr. Bowen. Pt had atraumatic worsening R knee pain x 1 month. Notes he went to a procedure center where his knee was aspirated 3 weeks ago. Pt was seen at [facility] 1/5 where his knee was aspirated with 283k cell count. WBC 14, ESR 78, +, AVSS. Pt started on vancomycin + rifampin at [facility]. 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     Since admission here, pt was consulted by ID and vascular, taken to OR with vascular on 1/10 for right AKA. Bld cx with MRSA, switched from vanco  to daptomycin, had JOHN PAUL with no vegetation, gallium negative. pt was taken to OR today with vascular for completion AKA, but hypotensive to SBP 30's on induction, given vasopressin bolus, started levophed, 2U PRBC intraop, procedure was aborted and pt transferred to SICU post-op.       PMH: as above     MEDS (current pre-op):  MEDICATIONS  (STANDING):  atorvastatin 80 milliGRAM(s) Oral at bedtime  chlorhexidine 2% Cloths 1 Application(s) Topical <User Schedule>  DAPTOmycin IVPB 600 milliGRAM(s) IV Intermittent every 24 hours  DULoxetine 20 milliGRAM(s) Oral daily  fentaNYL   Infusion 0.5 MICROgram(s)/kG/Hr (3.75 mL/Hr) IV Continuous <Continuous>  furosemide    Tablet 40 milliGRAM(s) Oral daily  gabapentin 100 milliGRAM(s) Oral three times a day  heparin   Injectable 5000 Unit(s) SubCutaneous every 8 hours  insulin glargine Injectable (LANTUS) 25 Unit(s) SubCutaneous at bedtime  insulin lispro (ADMELOG) corrective regimen sliding scale   SubCutaneous Before meals and at bedtime  insulin lispro Injectable (ADMELOG) 9 Unit(s) SubCutaneous three times a day before meals  levothyroxine 25 MICROGram(s) Oral daily  norepinephrine Infusion 0.04 MICROgram(s)/kG/Min (5.63 mL/Hr) IV Continuous <Continuous>  pantoprazole    Tablet 40 milliGRAM(s) Oral before breakfast  spironolactone 25 milliGRAM(s) Oral daily    MEDICATIONS  (PRN):  aluminum hydroxide/magnesium hydroxide/simethicone Suspension 30 milliLiter(s) Oral every 4 hours PRN Dyspepsia  cyclobenzaprine 5 milliGRAM(s) Oral three times a day PRN Muscle Spasm  HYDROmorphone  Injectable 0.5 milliGRAM(s) IV Push every 4 hours PRN Breakthrough pain  sodium chloride 0.9% lock flush 10 milliLiter(s) IV Push every 1 hour PRN Pre/post blood products, medications, blood draw, and to maintain line patency      Allergies: ACE inhibitors (Hives)  carvedilol (Other)  enalapril (Hives)  Entresto (Other)    Intolerances        ICU Vital Signs Last 24 Hrs  T(F): 97.3 (01-14-22 @ 06:23), Max: 98.2 (01-13-22 @ 10:10)  HR: 84 (01-14-22 @ 06:23) (80 - 93)  BP: 115/61 (01-14-22 @ 06:23) (83/46 - 149/58)  BP(mean): 66 (01-13-22 @ 08:41) (66 - 66)  ABP: --  RR: 19 (01-14-22 @ 06:23) (18 - 20)  SpO2: 97% (01-14-22 @ 06:23) (95% - 97%)    PHYSICAL EXAM:   Neurological: AAOx3, CNII-XII intact,  strength 5/5 b/l  ENT: mucus membrane moist  Cardiovascular: RRR  Respiratory: CTA  Gastrointestinal: soft, NT, ND, BS+  Extremities: warm, no dependent edema  Vascular: no cyanosis/erythema  Skin: no rashes  MSK: no joint swelling.     LABS:    01-14    134<L>  |  94<L>  |  17  ----------------------------<  258<H>  3.2<L>   |  30  |  1.44<H>    Ca    8.4      14 Jan 2022 06:48  Phos  3.3     01-14  Mg     1.8     01-14                          7.5    13.11 )-----------( 370      ( 14 Jan 2022 06:48 )             25.1   PT/INR - ( 14 Jan 2022 06:48 )   PT: 14.5 sec;   INR: 1.22          PTT - ( 14 Jan 2022 06:48 )  PTT:26.6 sec  CAPILLARY BLOOD GLUCOSE      POCT Blood Glucose.: 244 mg/dL (14 Jan 2022 05:48)  POCT Blood Glucose.: 295 mg/dL (13 Jan 2022 21:53)  POCT Blood Glucose.: 150 mg/dL (13 Jan 2022 17:47)  POCT Blood Glucose.: 124 mg/dL (13 Jan 2022 11:58)    Loja:	  [ ] None	[x ] Daily Loja Order Placed	   Indication:	  [ x] Strict I and O's    [ ] Obstruction     [ ] Incontinence + Stage 3 or 4 Decubitus  Central Line:  [x ] None	   [ ]  Medication / TPN Administration     [ ] No Peripheral IV

## 2022-01-14 NOTE — PROGRESS NOTE ADULT - SUBJECTIVE AND OBJECTIVE BOX
INTERVAL HPI/OVERNIGHT EVENTS:      Patient is a 66y old  Male who presents with a chief complaint of R Knee Pain (10 Kris 2022 08:32)  s/p R AKA 1/10/2022. Planned for OR today for closure, but hypotensive to 30s upon induction, so procedure was aborted; was intubated and sedated and transferred to SICU. Pt seen and examined at the bedside. No distress. Now extubated and on high flow O2 mask. No physical complaints, but understandably disappointed that procedure had to be cancelled.    FSG & insulin:  Yesterday -   Dinner . No insulin given, because he reportedly didn't eat.  Bedtime . Lantus 25 + lispro 6  today -   AM . Lispro 4. NPO   Pre lunch    Pt reports the following symptoms:    CONSTITUTIONAL:  Negative fever or chills, feels well, good appetite  EYES:  Negative  blurry vision or double vision  CARDIOVASCULAR:  Negative for chest pain or palpitations  RESPIRATORY:  Negative for cough, wheezing, or SOB   GASTROINTESTINAL:  Negative for nausea, vomiting, diarrhea, constipation, or abdominal pain  GENITOURINARY:  Negative frequency, urgency or dysuria  NEUROLOGIC:  No headache, confusion, dizziness, lightheadedness      MEDICATIONS  (STANDING):  atorvastatin 80 milliGRAM(s) Oral at bedtime  chlorhexidine 2% Cloths 1 Application(s) Topical <User Schedule>  dextrose 40% Gel 15 Gram(s) Oral once  dextrose 5%. 1000 milliLiter(s) (50 mL/Hr) IV Continuous <Continuous>  dextrose 5%. 1000 milliLiter(s) (100 mL/Hr) IV Continuous <Continuous>  dextrose 50% Injectable 25 Gram(s) IV Push once  dextrose 50% Injectable 12.5 Gram(s) IV Push once  dextrose 50% Injectable 25 Gram(s) IV Push once  DULoxetine 20 milliGRAM(s) Oral daily  furosemide    Tablet 40 milliGRAM(s) Oral daily  gabapentin 100 milliGRAM(s) Oral three times a day  glucagon  Injectable 1 milliGRAM(s) IntraMuscular once  heparin   Injectable 5000 Unit(s) SubCutaneous every 8 hours  insulin glargine Injectable (LANTUS) 22 Unit(s) SubCutaneous at bedtime  insulin lispro (ADMELOG) corrective regimen sliding scale   SubCutaneous every 6 hours  insulin lispro Injectable (ADMELOG) 7 Unit(s) SubCutaneous three times a day before meals  levothyroxine 25 MICROGram(s) Oral daily  pantoprazole    Tablet 40 milliGRAM(s) Oral before breakfast  povidone iodine 5% Nasal Swab 1 Application(s) Both Nostrils once  spironolactone 25 milliGRAM(s) Oral daily    MEDICATIONS  (PRN):  acetaminophen     Tablet .. 650 milliGRAM(s) Oral every 6 hours PRN Mild Pain (1 - 3)  ALPRAZolam 0.5 milliGRAM(s) Oral every 8 hours PRN anxiety  aluminum hydroxide/magnesium hydroxide/simethicone Suspension 30 milliLiter(s) Oral every 4 hours PRN Dyspepsia  cyclobenzaprine 5 milliGRAM(s) Oral three times a day PRN Muscle Spasm  oxyCODONE    IR 5 milliGRAM(s) Oral every 4 hours PRN Moderate Pain (4 - 6)  oxyCODONE    IR 10 milliGRAM(s) Oral once PRN Severe Pain (7 - 10)  sodium chloride 0.9% lock flush 10 milliLiter(s) IV Push every 1 hour PRN Pre/post blood products, medications, blood draw, and to maintain line patency      PHYSICAL EXAM  Vital Signs Last 24 Hrs  T(C): 36.7 (11 Jan 2022 14:29), Max: 36.8 (11 Jan 2022 06:16)  T(F): 98.1 (11 Jan 2022 14:29), Max: 98.2 (11 Jan 2022 06:16)  HR: 80 (11 Jan 2022 12:17) (75 - 87)  BP: 112/53 (11 Jan 2022 12:17) (109/58 - 129/61)  BP(mean): 77 (11 Jan 2022 12:17) (76 - 80)  RR: 18 (11 Jan 2022 12:17) (16 - 18)  SpO2: 98% (11 Jan 2022 12:17) (95% - 98%)    Constitutional: NAD.   HEENT: NCAT, MMM, OP clear, EOMI, , no proptosis or lid retraction  Neck: no thyromegaly or palpable thyroid nodules   Respiratory: lungs CTAB.  Cardiovascular: regular rhythm, normal S1 and S2, no audible murmurs  GI: soft, NT/ND, no masses/HSM appreciated.  Neurology: no tremors  Skin: no visible rashes/lesions, R AKA   Psychiatric: AAO x 3, normal affect/mood.    LABS:                        9.2    14.91 )-----------( 274      ( 10 Kris 2022 23:05 )             29.7     01-10    132<L>  |  94<L>  |  17  ----------------------------<  194<H>  4.6   |  28  |  1.15    Ca    8.0<L>      10 Kris 2022 23:05  Phos  4.0     01-10  Mg     1.4     01-10      PT/INR - ( 10 Kris 2022 23:05 )   PT: 15.1 sec;   INR: 1.27          PTT - ( 10 Kris 2022 23:05 )  PTT:25.7 sec    Thyroid Stimulating Hormone, Serum: 2.850 uIU/mL (01-08 @ 08:25)  Thyroid Stimulating Hormone, Serum: 1.68 uIU/mL (03-12 @ 17:00)      HbA1C:   CAPILLARY BLOOD GLUCOSE      POCT Blood Glucose.: 185 mg/dL (11 Jan 2022 12:34)  POCT Blood Glucose.: 188 mg/dL (11 Jan 2022 09:52)  POCT Blood Glucose.: 201 mg/dL (11 Jan 2022 06:34)  POCT Blood Glucose.: 184 mg/dL (11 Jan 2022 00:18)  POCT Blood Glucose.: 166 mg/dL (10 Kris 2022 21:55)  POCT Blood Glucose.: 238 mg/dL (10 Kris 2022 16:35)

## 2022-01-14 NOTE — PRE-OP CHECKLIST - NSSDAENDDT_GEN_ALL_CORE
Clearance for colon 7/21 with [address]  Clearance form faxed 
Clearance rec'd; LVM for pt to return call to confirm 
Patient returned call and advised to stop Eliquis 2 days prior to procedure 
Why does your doctor want you to have this procedure? Hx of polyps    Do you have kidney disease?  no  If yes, are you on dialysis :     Have you had diverticulitis within the past 2 months? no    Are you diabetic?  yes  If yes, insulin dependent: If yes, provide diabetic instructions sheet     Do take iron supplements?  no  If yes, instruct patient to hold iron supplement for 7 days prior    Are you on a blood thinner? yes   Was the blood thinner sheet complete and faxed to cardiologist yes  Plavix (clopidogrel), Coumadin (warfarin), Lovenox (enoxaparin), Xarelto (rivaroxaban), Pradaxa(dabigatran), Eliquis(apixaban) Savaysa/Lixiana (edoxapan)    Do you have an automatic implantable cardiac defibrillator (AICD)/pacemaker ([facility])? no  Was AICD/pacemaker sheet completed and faxed to cardiologist? no    Are you on home oxygen? no  If yes, continuous or nocturnal:     Have you been treated for MRSA, VRE or any communicable diseases? no    Heart attack, stroke, or stent within 3 months? no  Schedule at Hospital if within 3-6 months   Use nitroglycerin for chest pain in the last 6 months? no    History of organ  transplant?  no   If yes, notify Endo      History of neck/throat/tongue surgery or cancer? no  IF yes, notify Endo      Any problems with anesthesia in the past? no     Was stool C diff ordered?  no Stool specimen needs to be completed prior to procedure    Do have any facial or body piercings?no     Do you have a latex allergy? no     Do have an allergy to metals? (Bravo study only) no     If pediatric patient, was consent faxed to pediatrician no     Patient rights reviewed yes    Miralax prep given and instructions emailed to patient 
14-Jan-2022 07:02

## 2022-01-14 NOTE — BRIEF OPERATIVE NOTE - OPERATION/FINDINGS
Upon induction of anesthesia, patient became profoundly hypotensive to systolic of 30. Vasopressin bolus was administered and norepinephrine gtt was initiated. Patient was transfused two units of PRBCs with subsequent normalization of hemodynamics. The procedure was aborted at this time and the patient remained intubated with admission to SICU for further care. Loja was placed with 700cc UO.

## 2022-01-14 NOTE — CONSULT NOTE ADULT - ASSESSMENT
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 Castro (explant and abx spacer exchange), transferred here from [facility] for recurrent PJI for repeat spacer exchange and possible flap coverage with Dr. Vaughn/Dr. Bowen. Pt has been experiencing atraumatic worsening R knee pain x 1 month. Patient is now POD4 s/p AKA, and was to have a surgical completion today. Upon induction of anesthesia, primary team reports that his SBP decreased to ~30 mmHg. Cardiology is consulted fro hypotension.    Hypotension  66 year old man w/ known ischemic CM and a severely reduced EF (as above), w/ an episode of hypotension upon induction of preoperative anesthesia. 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 Castro (explant and abx spacer exchange), transferred here from [facility] for recurrent PJI for repeat spacer exchange and possible flap coverage with Dr. Vaughn/Dr. Bowen. Pt has been experiencing atraumatic worsening R knee pain x 1 month. Patient is now POD4 s/p AKA, and was to have a surgical completion today. Upon induction of anesthesia, primary team reports that his SBP decreased to ~30 mmHg. Cardiology is consulted fro hypotension.    Hypotension  66 year old man w/ known ischemic CM and a severely reduced EF (as above), w/ an episode of hypotension upon induction of preoperative anesthesia. Unable to asses RoS given sedated status. TTE w/ severely reduced left ventricular systolic function. Ultimately, hypotension likely iatrogenic upon induction of anesthesia preoperatively.  - hold home spironolactone 25mg PO QDay given pressor need at this time       -> restart when tolerated by BP and pressor requirement resolves  - obtain EKG now to assess for arrhythmia causing hypotension  - trend electrolytes and replete PRN (K>4, Mg>2)   - obtain daily EKG   - continue telemetry monitoring     HFrEF  Patient w/ a known severely reduced LV systolic function 2/2 ischemic CM. Euvolemic on exam.  - hold home spironolactone 25mg PO QDay given pressor need at this time       -> restart when tolerated by BP and pressor requirement resolves  - continue home furosemide 40mg PO QDay  - obtain daily weight  - trend accurate I/O  - trend electrolytes and replete PRN (K>4, Mg>2) given daily diuresis    CAD  Patient w/ a known CAD s/p MIDCAB LIMA to LAD, and PCI to OM1 and RCA in 2018.  - continue home ASA 81mg PO QDay  - continue atorvastatin 80mg PO QDay  - holding home prasugrel 10mg PO QDay perioperatively  - obtain daily EKG     Attending attestation to follow.  66 year old man, w/ a PMHx of CAD (s/p MIDCAB LIMA to LAD, and PCI to OM1 and RCA in 2018), HrEFF (EF 20-25%), PPM, DM, 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 Castro (explant and abx spacer exchange), transferred here from [facility] for recurrent PJI for repeat spacer exchange and possible flap coverage with Dr. Vaughn/Dr. Bowen. Pt has been experiencing atraumatic worsening R knee pain x 1 month. Patient is now POD4 s/p AKA, and was to have a surgical completion today. Upon induction of anesthesia, primary team reports that his SBP decreased to ~30 mmHg. Cardiology is consulted fro hypotension.    Hypotension  66 year old man w/ known ischemic CM and a severely reduced EF (as above), w/ an episode of hypotension upon induction of preoperative anesthesia. Unable to asses RoS given sedated status. TTE w/ severely reduced left ventricular systolic function. Ultimately, hypotension likely iatrogenic upon induction of anesthesia preoperatively. Patient is now extubated, and off all pressors (previously norepinephrine).  - holding home spironolactone 25mg PO QDay given prior pressor needs, but consider restarting as patient now off pressors with appropriate BP  - holding home metoprolol tartrate 25mg PO BID, but restart as patient now off pressors with appropriate BP  - obtain EKG now to assess for arrhythmia causing hypotension  - trend electrolytes and replete PRN (K>4, Mg>2)   - obtain daily EKG   - continue telemetry monitoring     HFrEF  Patient w/ a known severely reduced LV systolic function 2/2 ischemic CM. Euvolemic on exam.  - holding home spironolactone 25mg PO QDay given prior pressor needs, but consider restarting as patient now off pressors with appropriate BP  - holding home metoprolol tartrate 25mg PO BID, but restart as patient now off pressors with appropriate BP       -> convert to metoprolol succinate prior to discvharge for mortality benefit given HFrEF  - holding home digoxin 0.25mg PO QDay  - continue home furosemide 40mg PO QDay  - obtain daily weight  - trend accurate I/O  - trend electrolytes and replete PRN (K>4, Mg>2) given daily diuresis    CAD  Patient w/ a known CAD s/p MIDCAB LIMA to LAD, and PCI to OM1 and RCA in 2018.  - continue home ASA 81mg PO QDay  - continue atorvastatin 80mg PO QDay  - holding home prasugrel 10mg PO QDay perioperatively       -> patient is now 2 years s/p coronary intervention, so it is acceptable to hold this antiplatelet agent during this perioperative period  - obtain daily EKG     Attending attestation to follow.  66 year old man, w/ a PMHx of CAD (s/p MIDCAB LIMA to LAD, and PCI to OM1 and RCA in 2018), HrEFF (EF 20-25%), PPM, DM, 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 Castro (explant and abx spacer exchange), transferred here from [facility] for recurrent PJI for repeat spacer exchange and possible flap coverage with Dr. Vaughn/Dr. Bowen. Pt has been experiencing atraumatic worsening R knee pain x 1 month. Patient is now POD4 s/p AKA, and was to have a surgical completion today. Upon induction of anesthesia, primary team reports that his SBP decreased to ~30 mmHg. Cardiology is consulted fro hypotension.    Hypotension  66 year old man w/ known ischemic CM and a severely reduced EF (as above), w/ an episode of hypotension upon induction of preoperative anesthesia. Unable to asses RoS given sedated status. TTE w/ severely reduced left ventricular systolic function. Ultimately, hypotension likely iatrogenic upon induction of anesthesia preoperatively and w/ concomitant hypovolemia given NPO status and Lasix dosed QDay in patient who normally uses it PERN. Patient is now extubated, and off all pressors (previously norepinephrine). Therefore, patient likely hypotensive 2/2 hypovolemia  - hold home spironolactone 25mg PO QDay, and will consider restarting postoperatively as tolerated by BP  - holding home metoprolol tartrate 25mg PO BID, but consider starting metoprolol tartrate 12.5mg PO BID as patient now off pressors with appropriate BP  - consider cardiac anesthesia for any ensuing procedures  - obtain EKG now to assess for arrhythmia causing hypotension  - trend electrolytes and replete PRN (K>4, Mg>2)   - obtain daily EKG   - continue telemetry monitoring     HFrEF  Patient w/ a known severely reduced LV systolic function 2/2 ischemic CM. Euvolemic on exam.  - hold home spironolactone 25mg PO QDay, and will consider restarting postoperatively as tolerated by BP  - holding home metoprolol tartrate 25mg PO BID, but consider starting metoprolol tartrate 12.5mg PO BID as patient now off pressors with appropriate BP       -> convert to metoprolol succinate prior to discvharge for mortality benefit given HFrEF  - holding home digoxin 0.25mg PO QDay  - hold home furosemide 40mg PO QDay to prevent hypovolemia       -> will reassess volume status daily and dose PRN  - obtain daily weight  - trend accurate I/O  - trend electrolytes and replete PRN (K>4, Mg>2) given daily diuresis    CAD  Patient w/ a known CAD s/p MIDCAB LIMA to LAD, and PCI to OM1 and RCA in 2018.  - continue home ASA 81mg PO QDay  - continue atorvastatin 80mg PO QDay  - holding home prasugrel 10mg PO QDay perioperatively       -> patient is now 2 years s/p coronary intervention, so it is acceptable to hold this antiplatelet agent during this perioperative period  - obtain daily EKG     Attending attestation to follow.  66 year old man, w/ a PMHx of CAD (s/p MIDCAB LIMA to LAD, and PCI to OM1 and RCA in 2018), HrEFF (EF 20-25%), PPM, DM, 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 Castro (explant and abx spacer exchange), transferred here from [facility] for recurrent PJI for repeat spacer exchange and possible flap coverage with Dr. Vaughn/Dr. Bowen. Pt has been experiencing atraumatic worsening R knee pain x 1 month. Patient is now POD4 s/p AKA, and was to have a surgical completion today. Upon induction of anesthesia, primary team reports that his SBP decreased to ~30 mmHg. Cardiology is consulted fro hypotension.    Hypotension  66 year old man w/ known ischemic CM and a severely reduced EF (as above), w/ an episode of hypotension upon induction of preoperative anesthesia. Unable to asses RoS given sedated status. TTE w/ severely reduced left ventricular systolic function. Ultimately, hypotension likely iatrogenic upon induction of anesthesia preoperatively and w/ concomitant hypovolemia given NPO status and Lasix dosed QDay in patient who normally uses it PERN. Patient is now extubated, and off all pressors (previously norepinephrine). Therefore, patient likely hypotensive 2/2 hypovolemia  - hold home spironolactone 25mg PO QDay, and will consider restarting postoperatively as tolerated by BP  - holding home metoprolol tartrate 25mg PO BID, but consider starting metoprolol tartrate 12.5mg PO BID as patient now off pressors with appropriate BP  - consider cardiac anesthesia for any ensuing procedures  - obtain EKG now to assess for arrhythmia causing hypotension  - trend electrolytes and replete PRN (K>4, Mg>2)   - obtain daily EKG   - continue telemetry monitoring     HFrEF  Patient w/ a known severely reduced LV systolic function 2/2 ischemic CM. Euvolemic on exam.  - hold home spironolactone 25mg PO QDay, and will consider restarting postoperatively as tolerated by BP  - holding home metoprolol tartrate 25mg PO BID, but consider starting metoprolol tartrate 12.5mg PO BID as patient now off pressors with appropriate BP       -> convert to metoprolol succinate prior to discvharge for mortality benefit given HFrEF  - holding home digoxin 0.25mg PO QDay  - hold home furosemide 40mg PO QDay to prevent hypovolemia       -> will reassess volume status daily and dose PRN  - obtain daily weight  - trend accurate I/O  - trend electrolytes and replete PRN (K>4, Mg>2) given daily diuresis    CAD  Patient w/ a known CAD s/p MIDCAB LIMA to LAD, and PCI to OM1 and RCA in 2018.  - continue home ASA 81mg PO QDay  - continue atorvastatin 80mg PO QDay  - holding home prasugrel 10mg PO QDay perioperatively       -> patient is now 2 years s/p coronary intervention, so it is acceptable to hold this antiplatelet agent during this perioperative period  - obtain daily EKG

## 2022-01-14 NOTE — PROGRESS NOTE ADULT - SUBJECTIVE AND OBJECTIVE BOX
O/N: ASHLYN CHRISTIAN                          ---------------------------------------------------------------------------  PLEASE CHECK WHEN PRESENT:  [ x ] Heart Failure  [  ] Acute  [  ] Acute on Chronic  [x  ] Chronic  -------------------------------------------------------------------  [  ]Diastolic [HFpEF]  [x  ]Systolic [HFrEF]  [  ]Combined [HFpEF & HFrEF]  [  ] afib  [x  ] hypertensive heart disease  [  ]Other:  -------------------------------------------------------------------  [ ] Respiratory failure  [ ] Acute cor pulmonale  [ ] Asthma/COPD Exacerbation  [ ] Pleural effusion  [ ] Aspiration pneumonia  -------------------------------------------------------------------  [  ]MOISES  [  ]ATN  [  ]Reneal Medullary Necrosis  [  ]Renal Cortical Necrosis  [  ]Other Pathological Lesions:    [  ]CKD 1  [  ]CKD 2  [  ]CKD 3  [  ]CKD 4  [  ]CKD 5  [  ]Other  -------------------------------------------------------------------  [ x ]Diabetes  [  ] Diabetic PVD Ulcer  [  ] Neuropathic ulcer to DM  [  ] Diabetes with Nephropathy  [  ] Osteomyelitis due to diabetes  --------------------------------------------------------------------  [  ]Malnutrition: See Nutrition Note  [  ]Cachexia  [  ]Other:   [  ]Supplement Ordered:  [  ]Morbid Obesity (BMI >=40]  ---------------------------------------------------------------------  [ ] Sepsis/severe sepsis/septic shock  [ ] UTI  [ ] Pneumonia  -----------------------------------------------------------------------  [ ] Acidosis/alkalosis  [ ] Fluid overload  [ ] Hypokalemia  [ ] Hyperkalemia  [ ] Hypomagnesemia  [ ] Hypophosphatemia  [ ] Hyperphosphatemia  ------------------------------------------------------------------------  [ ] Acute blood loss anemia  [ ] Post op blood loss anemia  [ ] Iron deficiency anemia  [ ] Anemia due to chronic disease  [ ] Hypercoagulable state  ----------------------------------------------------------------------  [ ] Cerebral infarction  [ ] Transient ischemia attack  [ ] Encephalopathy    A/P: 65yo M hx of CAD, CHF (EF 20-25%), DM, 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   - abx duratiion 4 weeks    DM:  -ISS  -Lantus 27, lispro 9 TID    Diet:   -Regular /NPO PM    Activity:   -PT/OT consult  - anticipate acute rehab placement when medically ready for discharge     DVTPPx: HSQ    Dispo:  pending closure of right above knee amputation today   O/N: ANAHI, VSS        SUBJECTIVE: Patient seen at bedside in no acute distress. Complaining of some R AKA pain. No CP, SOB, fevers, chills.      Vital Signs Last 24 Hrs  T(C): 36.3 (14 Jan 2022 06:23), Max: 36.8 (13 Jan 2022 10:10)  T(F): 97.3 (14 Jan 2022 06:23), Max: 98.2 (13 Jan 2022 10:10)  HR: 84 (14 Jan 2022 06:23) (80 - 93)  BP: 115/61 (14 Jan 2022 06:23) (83/46 - 149/58)  BP(mean): 66 (13 Jan 2022 08:41) (66 - 66)  RR: 19 (14 Jan 2022 06:23) (18 - 20)  SpO2: 97% (14 Jan 2022 06:23) (95% - 97%)    Physical Exam:  General: NAD  Pulmonary: nonlabored breathing, RA  Cardiovascular: NSR  Abdominal: soft, NT/ND  Vascular: wound was clean, dry and intact. healthy tissue, pink and well perfused. minimal bleeding, Xeroform, gauze, kerlix and ace wraps used to redress stump.    Lines/drains/tubes:    I&O's Summary    13 Jan 2022 07:01  -  14 Jan 2022 07:00  --------------------------------------------------------  IN: 720 mL / OUT: 1500 mL / NET: -780 mL        LABS:                        7.5    13.11 )-----------( 370      ( 14 Jan 2022 06:48 )             25.1     01-13    132<L>  |  93<L>  |  15  ----------------------------<  242<H>  3.7   |  28  |  1.36<H>    Ca    8.0<L>      13 Jan 2022 07:26  Phos  3.3     01-13  Mg     2.0     01-13      PT/INR - ( 13 Jan 2022 10:17 )   PT: 14.8 sec;   INR: 1.25          PTT - ( 13 Jan 2022 10:17 )  PTT:26.2 sec    CAPILLARY BLOOD GLUCOSE      POCT Blood Glucose.: 244 mg/dL (14 Jan 2022 05:48)  POCT Blood Glucose.: 295 mg/dL (13 Jan 2022 21:53)  POCT Blood Glucose.: 150 mg/dL (13 Jan 2022 17:47)  POCT Blood Glucose.: 124 mg/dL (13 Jan 2022 11:58)        RADIOLOGY & ADDITIONAL STUDIES:                    ---------------------------------------------------------------------------  PLEASE CHECK WHEN PRESENT:  [ x ] Heart Failure  [  ] Acute  [  ] Acute on Chronic  [x  ] Chronic  -------------------------------------------------------------------  [  ]Diastolic [HFpEF]  [x  ]Systolic [HFrEF]  [  ]Combined [HFpEF & HFrEF]  [  ] afib  [x  ] hypertensive heart disease  [  ]Other:  -------------------------------------------------------------------  [ ] Respiratory failure  [ ] Acute cor pulmonale  [ ] Asthma/COPD Exacerbation  [ ] Pleural effusion  [ ] Aspiration pneumonia  -------------------------------------------------------------------  [  ]MOISES  [  ]ATN  [  ]Reneal Medullary Necrosis  [  ]Renal Cortical Necrosis  [  ]Other Pathological Lesions:    [  ]CKD 1  [  ]CKD 2  [  ]CKD 3  [  ]CKD 4  [  ]CKD 5  [  ]Other  -------------------------------------------------------------------  [ x ]Diabetes  [  ] Diabetic PVD Ulcer  [  ] Neuropathic ulcer to DM  [  ] Diabetes with Nephropathy  [  ] Osteomyelitis due to diabetes  --------------------------------------------------------------------  [  ]Malnutrition: See Nutrition Note  [  ]Cachexia  [  ]Other:   [  ]Supplement Ordered:  [  ]Morbid Obesity (BMI >=40]  ---------------------------------------------------------------------  [ ] Sepsis/severe sepsis/septic shock  [ ] UTI  [ ] Pneumonia  -----------------------------------------------------------------------  [ ] Acidosis/alkalosis  [ ] Fluid overload  [ ] Hypokalemia  [ ] Hyperkalemia  [ ] Hypomagnesemia  [ ] Hypophosphatemia  [ ] Hyperphosphatemia  ------------------------------------------------------------------------  [ ] Acute blood loss anemia  [ ] Post op blood loss anemia  [ ] Iron deficiency anemia  [ ] Anemia due to chronic disease  [ ] Hypercoagulable state  ----------------------------------------------------------------------  [ ] Cerebral infarction  [ ] Transient ischemia attack  [ ] Encephalopathy    A/P: 65yo M hx of CAD, CHF (EF 20-25%), DM, R TKA several years ago c/b recurrent PJI and revisions, now s/p guillotine R AKA 1/10/22      Vascular/PAD:  -Recurrent septic arthritis of R knee now s/p guillotine R AKA 1/10/22  -Pain control  - Plan for OR today for closure    CAD/CHF:   -c/w home spironolactone, lasix  -JOHN PAUL normal    ID:  - h/o MRSA   - currently on vanc 1250 Q24 hrs, vanc trough 1/14 evening  - picc placed for long term abx.   - abx duratiion 4 weeks    DM:  -ISS  -Lantus 27, lispro 9 TID    Diet:   -Regular /NPO PM    Activity:   -PT/OT consult  - anticipate acute rehab placement when medically ready for discharge     DVTPPx: HSQ    Dispo:  pending closure of right above knee amputation today

## 2022-01-14 NOTE — PROGRESS NOTE ADULT - PROBLEM SELECTOR PLAN 1
Please increase lantus  to  units at night .  Please increase lispro to  units before each meal.  Please continue lispro moderate dose sliding scale four times daily with meals and at bedtime    Pt's fingerstick glucose goal is 100-180.    Will continue to monitor     For discharge, pt can continue    Pt can follow up at discharge with Dr Schmitt at scheduled appt. 2/8.  Will discuss case with Dr. Luevano    and update primary team. Please increase lantus  to 32  units at night .  Please increase lispro to 9  units before each meal.  Please continue lispro moderate dose sliding scale four times daily with meals and at bedtime    Pt's fingerstick glucose goal is 100-180.    Will continue to monitor     For discharge, pt can continue    Pt can follow up at discharge with Dr Schmitt at scheduled appt. 2/8.  Will discuss case with Dr. Luevano    and update primary team.

## 2022-01-14 NOTE — PRE-OP CHECKLIST - SELECT TESTS ORDERED
BMP/CBC/INR/Type and Screen
CBC/CMP/PT/PTT/INR/Type and Screen/Urinalysis/EKG/CXR/COVID-19
244/BMP/CBC/PT/PTT/INR/POCT Blood Glucose/COVID-19

## 2022-01-14 NOTE — CONSULT NOTE ADULT - ASSESSMENT
66yMale Hx CAD, CHF (EF 15-20%), DM, 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 Castro (explant and abx spacer exchange), transferred here on 1/6 from [facility] for MRSA bacteremia due to recurrent PJI. He was taken to OR with ortho on Revision gastroc flap by PRS, antibiotic cement spacer removal, I&D, replacement on 1/6, then taken to OR with vascular on 1/10 for right AKA. taken to OR for completion AKA today, but hypotensive intraop, procedure aborted, kept intubated and sent to SICU.       NEURO: sedated w/ propofol/fentanyl   CV: echo 1/7 EF 15-20%, PASP 37.   PULM: intubated on vent   GI/FEN: NPO. protonix while on vent  : fields for strict I&O   ENDO: ISS  ID: MRSA bacteremia from joint infx. Dapto  PPX: SCDs, SQH  LINES: Rivka Galan (1/14--)   WOUNDS/DRAINS: right AKA site.        66yMale Hx CAD, CHF (EF 15-20%), DM, 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 Castro (explant and abx spacer exchange), transferred here on 1/6 from [facility] for MRSA bacteremia due to recurrent PJI. He was taken to OR with ortho on Revision gastroc flap by PRS, antibiotic cement spacer removal, I&D, replacement on 1/6, then taken to OR with vascular on 1/10 for right AKA. taken to OR for completion AKA today, but hypotensive on induction, procedure aborted, kept intubated and sent to SICU.          66yMale Hx CAD s/p VERONICA 2018, MIDCAB, CHF (EF 15-20%), DM, 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 Castro (explant and abx spacer exchange), transferred here on 1/6 from [facility] for MRSA bacteremia due to recurrent PJI. He was taken to OR with ortho on Revision gastroc flap by PRS, antibiotic cement spacer removal, I&D, replacement on 1/6, then taken to OR with vascular on 1/10 for right AKA. taken to OR for completion AKA today, but hypotensive on induction, procedure aborted, kept intubated and sent to SICU.     Neuro: propofol - will wean sedation   CV:  hypotensive to SBP 30's on induction, given vasopressin bolus, started levophed, 2U PRBC intraop. hypotension likely medication related. currently on levophed gtt. check EKG, Cards consulted. recent echo 1/6 EF 15-20%, PASP 37, has AICD.   Pulm: kept intubated /12/5/100, will CPAP when awake alert, extubate if passes spontaneous breathing trial   GI/FEN: NPO while intubated.  protonix while vented.   : fields, mild MOISES, Cr elevated compared with baseline. monitor strict I&O.   ID: Daptomycin (1/13-) for MRSA septic joint. ID appreciated.    Heme: HSQ  Endo: ISS, cont premeal lispro 9 tid (when eating), lantus 25 qhs, synthroid  Wounds/drains: left AKA kirill (dressing by vascular daily),   LINES: R PICC, L midline (1/14--)  right radial a-line (1/14--), PIVs  PT: not ordered  Dispo: SICU       66yMale Hx CAD s/p VERONICA 2018, MIDCAB, CHF (EF 15-20%), DM, 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 Castro (explant and abx spacer exchange), transferred here on 1/6 from [facility] for MRSA bacteremia due to recurrent PJI. He was taken to OR with ortho on Revision gastroc flap by PRS, antibiotic cement spacer removal, I&D, replacement on 1/6, then taken to OR with vascular on 1/10 for right AKA. taken to OR for completion AKA today, but hypotensive on induction, procedure aborted, kept intubated and sent to SICU.     Neuro: propofol - will wean sedation   CV:  hypotensive to SBP 30's on induction, given vasopressin bolus, started levophed, 2U PRBC intraop. hypotension likely medication related. currently OFF levophed gtt. check EKG, Cards consulted. recent echo 1/6 EF 15-20%, PASP 37, has AICD.   Pulm: kept intubated /12/5/100, will CPAP when awake alert, extubate if passes spontaneous breathing trial   GI/FEN: NPO while intubated.  protonix while vented.   : fields, mild MOISES, Cr elevated compared with baseline. monitor strict I&O.   ID: Daptomycin (1/13-) for MRSA septic joint. ID appreciated.    Heme: HSQ  Endo: ISS, cont premeal lispro 9 tid (when eating), lantus 25 qhs, synthroid  Wounds/drains: left AKA kirill (dressing by vascular daily),   LINES: R PICC, L midline (1/14--)  right radial a-line (1/14--), PIVs  PT: not ordered  Dispo: SICU

## 2022-01-14 NOTE — PROGRESS NOTE ADULT - ASSESSMENT
66M hx of CAD with CABG , CHF (EF 20-25%) with AICD, T2DM c/b neuropathy with hx of diabetic ulcer, HLD. HTN, COPD, diverticulitis, celiac disease, anxiety and depression, cholecystostomy, R hip replacement 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 Castro (explant and abx spacer exchange), transferred here from [facility] for recurrent PJI for repeat spacer exchange and possible flap coverage with Dr. Vaughn/Dr. Bowen. Pt has been experiencing atraumatic worsening R knee pain x 1 month. Notes he went to a procedure center where his knee was aspirated 3 weeks ago. Pt was seen at [facility] yesterday where his knee was aspirated with 283k cell count. WBC 14, ESR 78, +, AVSS. Pt started on vancomycin + rifampin. 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 Now s/p Replacement, antibiotic spacer 06-Jan-2022. Also with hyperglycemia.  now s/p R AKA on vascular service     A1C: 9.0%  Weight: 97kg BMI 28  Cr/GFR: 0.9/104  EF: 20-25%

## 2022-01-14 NOTE — CONSULT NOTE ADULT - SUBJECTIVE AND OBJECTIVE BOX
INCOMPLETE    HPI:  66 year old man, w/ a PMHx of CAD (s/p MIDCAB LIMA to LAD, and PCI to OM1 and RCA in 2018), HrEFF (EF 20-25%), DM, 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 Castro (explant and abx spacer exchange), transferred here from [facility] for recurrent PJI for repeat spacer exchange and possible flap coverage with Dr. Vaughn/Dr. Bowen. Pt has been experiencing atraumatic worsening R knee pain x 1 month. Patient is now POD4 s/p AKA, and was to have a surgical completion today. Upon induction of anesthesia, primary team reports that his SBP decreased to ~30 mmHg. Cardiology is consulted fro hypotension.    ROS: A 10-point review of systems was otherwise negative.    PAST MEDICAL & SURGICAL HISTORY:  Status post percutaneous transluminal coronary angioplasty     2 stents  Atherosclerosis of coronary artery     CAD (coronary artery disease)  Osteoarthritis  HLD (hyperlipidemia)  Blood clot due to device, implant, or graft    was on blood thinners  Diabetes     on insulin pump  HTN (hypertension)  CHF (congestive heart failure)     EF ~ 25%  History of celiac disease  Diverticulitis  STEMI (ST elevation myocardial infarction)  Diabetic neuropathy  Anxiety and depression  Preoperative clearance  Other postprocedural status     Fixation hardware in foot LEFT  Stented coronary artery     10/18 heart attack  INFERIOR WALL MI  S/P CABG x 1     2018  S/P TKR (total knee replacement), right     with infection Mrsa     per pt he was cleared from MRSA infection  Surgery, elective     right knee wound debridement  History of open reduction and internal fixation (ORIF) procedure  H/O shoulder surgery     right  AICD (automatic cardioverter/defibrillator) present  Cholecystostomy care     drain inserted 2020 &amp; removed 4 months ago  History of tonsillectomy  History of hip replacement, total, right    SOCIAL HISTORY/FAMILY HISTORY:  Family history of heart disease (Mother)    Family history of allergies  daughter    ALLERGIES: 	  ACE inhibitors (Hives)  carvedilol (Other)  enalapril (Hives)  Entresto (Other)    Home Medications:  ALPRAZolam 0.5 mg oral tablet: 1 tab(s) orally every 8 hours (05 Jan 2022 09:05)  aluminum hydroxide-magnesium hydroxide 200 mg-200 mg/5 mL oral suspension: 30 milliliter(s) orally every 4 hours, As needed, Dyspepsia (05 Jan 2022 20:21)  aspirin 81 mg oral delayed release tablet: 1 tab(s) orally once a day (05 Jan 2022 09:26)  DULoxetine 30 mg oral delayed release capsule: 1 cap(s) orally 2 times a day (05 Jan 2022 09:25)  Effient 10 mg oral tablet: 1 tab(s) orally once a day (05 Jan 2022 09:26)  furosemide 40 mg oral tablet: 1 tab(s) orally once a day, As Needed (05 Jan 2022 09:26)  insulin glargine: 20 unit(s) subcutaneously once a day (05 Jan 2022 09:05)  levothyroxine 25 mcg (0.025 mg) oral tablet: 1 tab(s) orally once a day (05 Jan 2022 09:05)  morphine: 2 milligram(s) intravenous every 4 hours, As Needed (05 Jan 2022 20:21)  pantoprazole 40 mg oral delayed release tablet: 1 tab(s) orally once a day (05 Jan 2022 09:05)  rifAMPin 300 mg oral capsule: 1 cap(s) orally 2 times a day (05 Jan 2022 20:21)  spironolactone 25 mg oral tablet: 1 tab(s) orally once a day (05 Jan 2022 09:05)  vancomycin 1.5 g intravenous injection: 1.5 gram(s) intravenous every 12 hours (05 Jan 2022 20:21)    MEDICATIONS:  atorvastatin 80 milliGRAM(s) Oral at bedtime  chlorhexidine 0.12% Liquid 15 milliLiter(s) Oral Mucosa every 12 hours  chlorhexidine 2% Cloths 1 Application(s) Topical <User Schedule>  DAPTOmycin IVPB 600 milliGRAM(s) IV Intermittent every 24 hours  dextrose 40% Gel 15 Gram(s) Oral once  dextrose 5%. 1000 milliLiter(s) IV Continuous <Continuous>  dextrose 5%. 1000 milliLiter(s) IV Continuous <Continuous>  dextrose 50% Injectable 25 Gram(s) IV Push once  dextrose 50% Injectable 12.5 Gram(s) IV Push once  dextrose 50% Injectable 25 Gram(s) IV Push once  DULoxetine 20 milliGRAM(s) Oral daily  fentaNYL   Infusion 0.5 MICROgram(s)/kG/Hr IV Continuous <Continuous>  furosemide    Tablet 40 milliGRAM(s) Oral daily  gabapentin 100 milliGRAM(s) Oral three times a day  glucagon  Injectable 1 milliGRAM(s) IntraMuscular once  heparin   Injectable 5000 Unit(s) SubCutaneous every 8 hours  HYDROmorphone  Injectable 0.5 milliGRAM(s) IV Push every 4 hours PRN  insulin glargine Injectable (LANTUS) 25 Unit(s) SubCutaneous at bedtime  insulin lispro (ADMELOG) corrective regimen sliding scale   SubCutaneous Before meals and at bedtime  insulin lispro Injectable (ADMELOG) 9 Unit(s) SubCutaneous three times a day before meals  norepinephrine Infusion 0.04 MICROgram(s)/kG/Min IV Continuous <Continuous>  pantoprazole  Injectable 40 milliGRAM(s) IV Push daily  potassium chloride  10 mEq/100 mL IVPB 10 milliEquivalent(s) IV Intermittent every 1 hour  propofol Infusion 10 MICROgram(s)/kG/Min IV Continuous <Continuous>  sodium chloride 0.9% lock flush 10 milliLiter(s) IV Push every 1 hour PRN  spironolactone 25 milliGRAM(s) Oral daily    PHYSICAL EXAM:  T(C): 35.9 (01-14-22 @ 10:00), Max: 36.5 (01-13-22 @ 22:25)  HR: 65 (01-14-22 @ 10:00) (65 - 93)  BP: 152/67 (01-14-22 @ 10:00) (91/57 - 152/67)  RR: 20 (01-14-22 @ 10:00) (18 - 20)  SpO2: 100% (01-14-22 @ 10:00) (96% - 100%)  Wt(kg): --    GEN: Awake, comfortable. NAD.   HEENT: NCAT, PERRL, EOMI. Mucosa moist. No JVD.   RESP: CTA b/l  CV: RRR, normal s1/s2. No m/r/g.  ABD: Soft, NTND. BS+  EXT: Warm. No edema, clubbing, or cyanosis.   NEURO: AAOx3. No focal deficits.    I&O's Summary    13 Jan 2022 07:01  -  14 Jan 2022 07:00  --------------------------------------------------------  IN: 720 mL / OUT: 1500 mL / NET: -780 mL    14 Jan 2022 07:01  -  14 Jan 2022 10:33  --------------------------------------------------------  IN: 15.2 mL / OUT: 0 mL / NET: 15.2 mL      Height (cm): 185.4 (01-14 @ 06:23)  Weight (kg): 75 (01-14 @ 06:23)  BMI (kg/m2): 21.8 (01-14 @ 06:23)  BSA (m2): 1.98 (01-14 @ 06:23)  	  LABS:	 	                      8.6    16.90 )-----------( 353      ( 14 Jan 2022 10:22 )             26.8     01-14    134<L>  |  94<L>  |  17  ----------------------------<  258<H>  3.2<L>   |  30  |  1.44<H>    Ca    8.4      14 Jan 2022 06:48  Phos  3.3     01-14  Mg     1.8     01-14    < from: JOHN PAUL w/Doppler (01.12.22 @ 10:31) >  1. Severely reduced left ventricular systolic function.   2. Normal right ventricular size.   3. Reduced right ventricular systolic function.   4. Dilated left atrium.   5. No LA/RA/VERONICA/RAA thrombus seen.   6. No evidence of an intracardiac shunt.   7. Mild mitral regurgitation.   8. Mild tricuspid regurgitation.   9. Noechocardiographic evidence of vegetations on the valves or device   lead .  10. No evidence of pulmonary hypertension.  11. No pericardial effusion.  12. Compared to the previous JOHN PAUL performed on 7/21/2020, there have been   no significant interval changes.  < end of copied text >   INCOMPLETE    HPI:  66 year old man, w/ a PMHx of CAD (s/p MIDCAB LIMA to LAD, and PCI to OM1 and RCA in 2018), HrEFF (EF 20-25%), DM, 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 Castro (explant and abx spacer exchange), transferred here from [facility] for recurrent PJI for repeat spacer exchange and possible flap coverage with Dr. Vaughn/Dr. Bowen. Pt has been experiencing atraumatic worsening R knee pain x 1 month. 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 Since admission here, pt was consulted by ID and vascular, taken to OR with vascular on 1/10 for right AKA. Bld cx with MRSA, switched from vanco  to daptomycin, had JOHN PAUL with no vegetation, gallium negative. Patient was taken to OR today with vascular for completion AKA, but hypotensive to SBP 30's on induction of anesthesia w/ fentanyl 75mcg, and versed (unknown amount as per primary team); therefore, given vasopressin bolus, started levophed, 2U PRBC intraop, procedure was aborted and pt transferred to SICU post-op. Cardiology is consulted fro hypotension. He is currently intubated and sedated.    ROS: Unable to be performed given sedation.    PAST MEDICAL & SURGICAL HISTORY:  Status post percutaneous transluminal coronary angioplasty     2 stents  Atherosclerosis of coronary artery     CAD (coronary artery disease)  Osteoarthritis  HLD (hyperlipidemia)  Blood clot due to device, implant, or graft    was on blood thinners  Diabetes     on insulin pump  HTN (hypertension)  CHF (congestive heart failure)     EF ~ 25%  History of celiac disease  Diverticulitis  STEMI (ST elevation myocardial infarction)  Diabetic neuropathy  Anxiety and depression  Preoperative clearance  Other postprocedural status     Fixation hardware in foot LEFT  Stented coronary artery     10/18 heart attack  INFERIOR WALL MI  S/P CABG x 1     2018  S/P TKR (total knee replacement), right     with infection Mrsa     per pt he was cleared from MRSA infection  Surgery, elective     right knee wound debridement  History of open reduction and internal fixation (ORIF) procedure  H/O shoulder surgery     right  AICD (automatic cardioverter/defibrillator) present  Cholecystostomy care     drain inserted 2020 &amp; removed 4 months ago  History of tonsillectomy  History of hip replacement, total, right    SOCIAL HISTORY/FAMILY HISTORY:  Family history of heart disease (Mother)    Family history of allergies  daughter    ALLERGIES: 	  ACE inhibitors (Hives)  carvedilol (Other)  enalapril (Hives)  Entresto (Other)    Home Medications:  ALPRAZolam 0.5 mg oral tablet: 1 tab(s) orally every 8 hours (05 Jan 2022 09:05)  aluminum hydroxide-magnesium hydroxide 200 mg-200 mg/5 mL oral suspension: 30 milliliter(s) orally every 4 hours, As needed, Dyspepsia (05 Jan 2022 20:21)  aspirin 81 mg oral delayed release tablet: 1 tab(s) orally once a day (05 Jan 2022 09:26)  DULoxetine 30 mg oral delayed release capsule: 1 cap(s) orally 2 times a day (05 Jan 2022 09:25)  Effient 10 mg oral tablet: 1 tab(s) orally once a day (05 Jan 2022 09:26)  furosemide 40 mg oral tablet: 1 tab(s) orally once a day, As Needed (05 Jan 2022 09:26)  insulin glargine: 20 unit(s) subcutaneously once a day (05 Jan 2022 09:05)  levothyroxine 25 mcg (0.025 mg) oral tablet: 1 tab(s) orally once a day (05 Jan 2022 09:05)  morphine: 2 milligram(s) intravenous every 4 hours, As Needed (05 Jan 2022 20:21)  pantoprazole 40 mg oral delayed release tablet: 1 tab(s) orally once a day (05 Jan 2022 09:05)  rifAMPin 300 mg oral capsule: 1 cap(s) orally 2 times a day (05 Jan 2022 20:21)  spironolactone 25 mg oral tablet: 1 tab(s) orally once a day (05 Jan 2022 09:05)  vancomycin 1.5 g intravenous injection: 1.5 gram(s) intravenous every 12 hours (05 Jan 2022 20:21)    MEDICATIONS:  atorvastatin 80 milliGRAM(s) Oral at bedtime  chlorhexidine 0.12% Liquid 15 milliLiter(s) Oral Mucosa every 12 hours  chlorhexidine 2% Cloths 1 Application(s) Topical <User Schedule>  DAPTOmycin IVPB 600 milliGRAM(s) IV Intermittent every 24 hours  dextrose 40% Gel 15 Gram(s) Oral once  dextrose 5%. 1000 milliLiter(s) IV Continuous <Continuous>  dextrose 5%. 1000 milliLiter(s) IV Continuous <Continuous>  dextrose 50% Injectable 25 Gram(s) IV Push once  dextrose 50% Injectable 12.5 Gram(s) IV Push once  dextrose 50% Injectable 25 Gram(s) IV Push once  DULoxetine 20 milliGRAM(s) Oral daily  fentaNYL   Infusion 0.5 MICROgram(s)/kG/Hr IV Continuous <Continuous>  furosemide    Tablet 40 milliGRAM(s) Oral daily  gabapentin 100 milliGRAM(s) Oral three times a day  glucagon  Injectable 1 milliGRAM(s) IntraMuscular once  heparin   Injectable 5000 Unit(s) SubCutaneous every 8 hours  HYDROmorphone  Injectable 0.5 milliGRAM(s) IV Push every 4 hours PRN  insulin glargine Injectable (LANTUS) 25 Unit(s) SubCutaneous at bedtime  insulin lispro (ADMELOG) corrective regimen sliding scale   SubCutaneous Before meals and at bedtime  insulin lispro Injectable (ADMELOG) 9 Unit(s) SubCutaneous three times a day before meals  norepinephrine Infusion 0.04 MICROgram(s)/kG/Min IV Continuous <Continuous>  pantoprazole  Injectable 40 milliGRAM(s) IV Push daily  potassium chloride  10 mEq/100 mL IVPB 10 milliEquivalent(s) IV Intermittent every 1 hour  propofol Infusion 10 MICROgram(s)/kG/Min IV Continuous <Continuous>  sodium chloride 0.9% lock flush 10 milliLiter(s) IV Push every 1 hour PRN  spironolactone 25 milliGRAM(s) Oral daily    PHYSICAL EXAM:  T(C): 35.9 (01-14-22 @ 10:00), Max: 36.5 (01-13-22 @ 22:25)  HR: 65 (01-14-22 @ 10:00) (65 - 93)  BP: 152/67 (01-14-22 @ 10:00) (91/57 - 152/67)  RR: 20 (01-14-22 @ 10:00) (18 - 20)  SpO2: 100% (01-14-22 @ 10:00) (96% - 100%)  Wt(kg): --    GEN: Awake, comfortable. NAD.   HEENT: NCAT, PERRL, EOMI. Mucosa moist. No JVD.   RESP: CTA b/l  CV: RRR, normal s1/s2. No m/r/g.  ABD: Soft, NTND. BS+  EXT: Warm. No edema, clubbing, or cyanosis.   NEURO: AAOx3. No focal deficits.    I&O's Summary    13 Jan 2022 07:01  -  14 Jan 2022 07:00  --------------------------------------------------------  IN: 720 mL / OUT: 1500 mL / NET: -780 mL    14 Jan 2022 07:01  -  14 Jan 2022 10:33  --------------------------------------------------------  IN: 15.2 mL / OUT: 0 mL / NET: 15.2 mL      Height (cm): 185.4 (01-14 @ 06:23)  Weight (kg): 75 (01-14 @ 06:23)  BMI (kg/m2): 21.8 (01-14 @ 06:23)  BSA (m2): 1.98 (01-14 @ 06:23)  	  LABS:	 	                      8.6    16.90 )-----------( 353      ( 14 Jan 2022 10:22 )             26.8     01-14    134<L>  |  94<L>  |  17  ----------------------------<  258<H>  3.2<L>   |  30  |  1.44<H>    Ca    8.4      14 Jan 2022 06:48  Phos  3.3     01-14  Mg     1.8     01-14    < from: JOHN PAUL w/Doppler (01.12.22 @ 10:31) >  1. Severely reduced left ventricular systolic function.   2. Normal right ventricular size.   3. Reduced right ventricular systolic function.   4. Dilated left atrium.   5. No LA/RA/VERONICA/RAA thrombus seen.   6. No evidence of an intracardiac shunt.   7. Mild mitral regurgitation.   8. Mild tricuspid regurgitation.   9. Noechocardiographic evidence of vegetations on the valves or device   lead .  10. No evidence of pulmonary hypertension.  11. No pericardial effusion.  12. Compared to the previous JOHN PAUL performed on 7/21/2020, there have been   no significant interval changes.  < end of copied text >   HPI:  66 year old man, w/ a PMHx of CAD (s/p MIDCAB LIMA to LAD, and PCI to OM1 and RCA in 2018), HrEFF (EF 20-25%), DM, 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 Castro (explant and abx spacer exchange), transferred here from [facility] for recurrent PJI for repeat spacer exchange and possible flap coverage with Dr. Vaughn/Dr. Bowen. Pt has been experiencing atraumatic worsening R knee pain x 1 month. 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 Since admission here, pt was consulted by ID and vascular, taken to OR with vascular on 1/10 for right AKA. Bld cx with MRSA, switched from vanco  to daptomycin, had JOHN PAUL with no vegetation, gallium negative. Patient was taken to OR today with vascular for completion AKA, but hypotensive to SBP 30's on induction of anesthesia w/ fentanyl 75mcg, and versed (unknown amount as per primary team); therefore, given vasopressin bolus, started levophed, 2U PRBC intraop, procedure was aborted and pt transferred to SICU post-op. Cardiology is consulted fro hypotension. Patient denies CP, SOB, palpitations and dizziness.    ROS: A 10 point review of systems was performed and otherwise negative.    PAST MEDICAL & SURGICAL HISTORY:  Status post percutaneous transluminal coronary angioplasty     2 stents  Atherosclerosis of coronary artery     CAD (coronary artery disease)  Osteoarthritis  HLD (hyperlipidemia)  Blood clot due to device, implant, or graft    was on blood thinners  Diabetes     on insulin pump  HTN (hypertension)  CHF (congestive heart failure)     EF ~ 25%  History of celiac disease  Diverticulitis  STEMI (ST elevation myocardial infarction)  Diabetic neuropathy  Anxiety and depression  Preoperative clearance  Other postprocedural status     Fixation hardware in foot LEFT  Stented coronary artery     10/18 heart attack  INFERIOR WALL MI  S/P CABG x 1     2018  S/P TKR (total knee replacement), right     with infection Mrsa     per pt he was cleared from MRSA infection  Surgery, elective     right knee wound debridement  History of open reduction and internal fixation (ORIF) procedure  H/O shoulder surgery     right  AICD (automatic cardioverter/defibrillator) present  Cholecystostomy care     drain inserted 2020 &amp; removed 4 months ago  History of tonsillectomy  History of hip replacement, total, right    SOCIAL HISTORY/FAMILY HISTORY:  Family history of heart disease (Mother)    Family history of allergies  daughter    ALLERGIES: 	  ACE inhibitors (Hives)  carvedilol (Other)  enalapril (Hives)  Entresto (Other)    Home Medications:  ALPRAZolam 0.5 mg oral tablet: 1 tab(s) orally every 8 hours (05 Jan 2022 09:05)  aluminum hydroxide-magnesium hydroxide 200 mg-200 mg/5 mL oral suspension: 30 milliliter(s) orally every 4 hours, As needed, Dyspepsia (05 Jan 2022 20:21)  aspirin 81 mg oral delayed release tablet: 1 tab(s) orally once a day (05 Jan 2022 09:26)  DULoxetine 30 mg oral delayed release capsule: 1 cap(s) orally 2 times a day (05 Jan 2022 09:25)  Effient 10 mg oral tablet: 1 tab(s) orally once a day (05 Jan 2022 09:26)  furosemide 40 mg oral tablet: 1 tab(s) orally once a day, As Needed (05 Jan 2022 09:26)  insulin glargine: 20 unit(s) subcutaneously once a day (05 Jan 2022 09:05)  levothyroxine 25 mcg (0.025 mg) oral tablet: 1 tab(s) orally once a day (05 Jan 2022 09:05)  morphine: 2 milligram(s) intravenous every 4 hours, As Needed (05 Jan 2022 20:21)  pantoprazole 40 mg oral delayed release tablet: 1 tab(s) orally once a day (05 Jan 2022 09:05)  rifAMPin 300 mg oral capsule: 1 cap(s) orally 2 times a day (05 Jan 2022 20:21)  spironolactone 25 mg oral tablet: 1 tab(s) orally once a day (05 Jan 2022 09:05)  vancomycin 1.5 g intravenous injection: 1.5 gram(s) intravenous every 12 hours (05 Jan 2022 20:21)  Per patient: Digoxin 0.25mg PO QDay, and Metoprolol Tartrate 25mg PO BID      MEDICATIONS:  atorvastatin 80 milliGRAM(s) Oral at bedtime  chlorhexidine 0.12% Liquid 15 milliLiter(s) Oral Mucosa every 12 hours  chlorhexidine 2% Cloths 1 Application(s) Topical <User Schedule>  DAPTOmycin IVPB 600 milliGRAM(s) IV Intermittent every 24 hours  dextrose 40% Gel 15 Gram(s) Oral once  dextrose 5%. 1000 milliLiter(s) IV Continuous <Continuous>  dextrose 5%. 1000 milliLiter(s) IV Continuous <Continuous>  dextrose 50% Injectable 25 Gram(s) IV Push once  dextrose 50% Injectable 12.5 Gram(s) IV Push once  dextrose 50% Injectable 25 Gram(s) IV Push once  DULoxetine 20 milliGRAM(s) Oral daily  fentaNYL   Infusion 0.5 MICROgram(s)/kG/Hr IV Continuous <Continuous>  furosemide    Tablet 40 milliGRAM(s) Oral daily  gabapentin 100 milliGRAM(s) Oral three times a day  glucagon  Injectable 1 milliGRAM(s) IntraMuscular once  heparin   Injectable 5000 Unit(s) SubCutaneous every 8 hours  HYDROmorphone  Injectable 0.5 milliGRAM(s) IV Push every 4 hours PRN  insulin glargine Injectable (LANTUS) 25 Unit(s) SubCutaneous at bedtime  insulin lispro (ADMELOG) corrective regimen sliding scale   SubCutaneous Before meals and at bedtime  insulin lispro Injectable (ADMELOG) 9 Unit(s) SubCutaneous three times a day before meals  norepinephrine Infusion 0.04 MICROgram(s)/kG/Min IV Continuous <Continuous>  pantoprazole  Injectable 40 milliGRAM(s) IV Push daily  potassium chloride  10 mEq/100 mL IVPB 10 milliEquivalent(s) IV Intermittent every 1 hour  propofol Infusion 10 MICROgram(s)/kG/Min IV Continuous <Continuous>  sodium chloride 0.9% lock flush 10 milliLiter(s) IV Push every 1 hour PRN  spironolactone 25 milliGRAM(s) Oral daily    PHYSICAL EXAM:  T(C): 35.9 (01-14-22 @ 10:00), Max: 36.5 (01-13-22 @ 22:25)  HR: 65 (01-14-22 @ 10:00) (65 - 93)  BP: 152/67 (01-14-22 @ 10:00) (91/57 - 152/67)  RR: 20 (01-14-22 @ 10:00) (18 - 20)  SpO2: 100% (01-14-22 @ 10:00) (96% - 100%)  Wt(kg): --    GEN: Awake, comfortable. NAD.   HEENT: No JVD.   RESP: CTA b/l, no w/r/r.  CV: RRR, normal s1/s2. No m/r/g.  ABD: Soft, NTND. BS+  EXT: Warm. No edema, clubbing, or cyanosis.   NEURO: AAOx3. No focal deficits.    I&O's Summary    13 Jan 2022 07:01  -  14 Jan 2022 07:00  --------------------------------------------------------  IN: 720 mL / OUT: 1500 mL / NET: -780 mL    14 Jan 2022 07:01  -  14 Jan 2022 10:33  --------------------------------------------------------  IN: 15.2 mL / OUT: 0 mL / NET: 15.2 mL      Height (cm): 185.4 (01-14 @ 06:23)  Weight (kg): 75 (01-14 @ 06:23)  BMI (kg/m2): 21.8 (01-14 @ 06:23)  BSA (m2): 1.98 (01-14 @ 06:23)  	  LABS:	 	                      8.6    16.90 )-----------( 353      ( 14 Jan 2022 10:22 )             26.8     01-14    134<L>  |  94<L>  |  17  ----------------------------<  258<H>  3.2<L>   |  30  |  1.44<H>    Ca    8.4      14 Jan 2022 06:48  Phos  3.3     01-14  Mg     1.8     01-14    < from: JOHN PAUL w/Doppler (01.12.22 @ 10:31) >  1. Severely reduced left ventricular systolic function.   2. Normal right ventricular size.   3. Reduced right ventricular systolic function.   4. Dilated left atrium.   5. No LA/RA/VERONICA/RAA thrombus seen.   6. No evidence of an intracardiac shunt.   7. Mild mitral regurgitation.   8. Mild tricuspid regurgitation.   9. No echocardiographic evidence of vegetations on the valves or device   lead .  10. No evidence of pulmonary hypertension.  11. No pericardial effusion.  12. Compared to the previous JOHN PAUL performed on 7/21/2020, there have been   no significant interval changes.  < end of copied text >

## 2022-01-14 NOTE — PROGRESS NOTE ADULT - SUBJECTIVE AND OBJECTIVE BOX
Procedure: AKA closure aborted 2/2 HD instability  Surgeon: Mellissa    S: Pt seen and examined at bedside, extubated, awake and alert. He states he is feeling very well, denies any headaches, chest pain, shortness of breath.     O:  T(C): 36.4 (01-14-22 @ 14:13), Max: 36.4 (01-14-22 @ 14:13)  T(F): 97.5 (01-14-22 @ 14:13), Max: 97.5 (01-14-22 @ 14:13)  HR: 78 (01-14-22 @ 16:00) (75 - 84)  BP: 101/56 (01-14-22 @ 16:00) (100/57 - 106/59)  RR: 12 (01-14-22 @ 16:00) (12 - 13)  SpO2: 99% (01-14-22 @ 16:00) (98% - 100%)  Wt(kg): --                        8.6    16.90 )-----------( 353      ( 14 Jan 2022 10:22 )             26.8     01-14    135  |  96  |  15  ----------------------------<  308<H>  3.2<L>   |  26  |  1.31<H>    Ca    8.4      14 Jan 2022 10:22  Phos  3.7     01-14  Mg     1.6     01-14    TPro  6.5  /  Alb  2.8<L>  /  TBili  1.2  /  DBili  x   /  AST  31  /  ALT  25  /  AlkPhos  104  01-14      Gen: NAD, resting comfortably in bed  C/V: NSR  Pulm: Nonlabored breathing, no respiratory distress  Abd: soft, NT/ND Incision: dressing intact  Extrem: RLE AKA dressing in place, non-saturated      A/P: 66yMale s/p aborted AKA closure  Diet: CC regular  IVF: none  Pain/nausea control  DVT ppx: hsq  Dispo plan: SICU

## 2022-01-15 LAB
ANION GAP SERPL CALC-SCNC: 11 MMOL/L — SIGNIFICANT CHANGE UP (ref 5–17)
BUN SERPL-MCNC: 18 MG/DL — SIGNIFICANT CHANGE UP (ref 7–23)
CALCIUM SERPL-MCNC: 8.5 MG/DL — SIGNIFICANT CHANGE UP (ref 8.4–10.5)
CHLORIDE SERPL-SCNC: 95 MMOL/L — LOW (ref 96–108)
CO2 SERPL-SCNC: 25 MMOL/L — SIGNIFICANT CHANGE UP (ref 22–31)
CREAT SERPL-MCNC: 1.59 MG/DL — HIGH (ref 0.5–1.3)
GLUCOSE BLDC GLUCOMTR-MCNC: 165 MG/DL — HIGH (ref 70–99)
GLUCOSE BLDC GLUCOMTR-MCNC: 180 MG/DL — HIGH (ref 70–99)
GLUCOSE BLDC GLUCOMTR-MCNC: 257 MG/DL — HIGH (ref 70–99)
GLUCOSE BLDC GLUCOMTR-MCNC: 308 MG/DL — HIGH (ref 70–99)
GLUCOSE SERPL-MCNC: 345 MG/DL — HIGH (ref 70–99)
HCT VFR BLD CALC: 32.9 % — LOW (ref 39–50)
HGB BLD-MCNC: 10.1 G/DL — LOW (ref 13–17)
MAGNESIUM SERPL-MCNC: 2.2 MG/DL — SIGNIFICANT CHANGE UP (ref 1.6–2.6)
MCHC RBC-ENTMCNC: 24.3 PG — LOW (ref 27–34)
MCHC RBC-ENTMCNC: 30.7 GM/DL — LOW (ref 32–36)
MCV RBC AUTO: 79.3 FL — LOW (ref 80–100)
NRBC # BLD: 0 /100 WBCS — SIGNIFICANT CHANGE UP (ref 0–0)
PHOSPHATE SERPL-MCNC: 3.2 MG/DL — SIGNIFICANT CHANGE UP (ref 2.5–4.5)
PLATELET # BLD AUTO: 388 K/UL — SIGNIFICANT CHANGE UP (ref 150–400)
POTASSIUM SERPL-MCNC: 4.6 MMOL/L — SIGNIFICANT CHANGE UP (ref 3.5–5.3)
POTASSIUM SERPL-SCNC: 4.6 MMOL/L — SIGNIFICANT CHANGE UP (ref 3.5–5.3)
RBC # BLD: 4.15 M/UL — LOW (ref 4.2–5.8)
RBC # FLD: 18.3 % — HIGH (ref 10.3–14.5)
SODIUM SERPL-SCNC: 131 MMOL/L — LOW (ref 135–145)
WBC # BLD: 15.66 K/UL — HIGH (ref 3.8–10.5)
WBC # FLD AUTO: 15.66 K/UL — HIGH (ref 3.8–10.5)

## 2022-01-15 PROCEDURE — 99233 SBSQ HOSP IP/OBS HIGH 50: CPT | Mod: GC

## 2022-01-15 PROCEDURE — 99232 SBSQ HOSP IP/OBS MODERATE 35: CPT

## 2022-01-15 PROCEDURE — 71045 X-RAY EXAM CHEST 1 VIEW: CPT | Mod: 26

## 2022-01-15 PROCEDURE — 99231 SBSQ HOSP IP/OBS SF/LOW 25: CPT | Mod: GC

## 2022-01-15 RX ORDER — LANOLIN ALCOHOL/MO/W.PET/CERES
3 CREAM (GRAM) TOPICAL AT BEDTIME
Refills: 0 | Status: COMPLETED | OUTPATIENT
Start: 2022-01-15 | End: 2022-01-15

## 2022-01-15 RX ORDER — HYDROMORPHONE HYDROCHLORIDE 2 MG/ML
0.5 INJECTION INTRAMUSCULAR; INTRAVENOUS; SUBCUTANEOUS ONCE
Refills: 0 | Status: DISCONTINUED | OUTPATIENT
Start: 2022-01-15 | End: 2022-01-15

## 2022-01-15 RX ORDER — HYDROMORPHONE HYDROCHLORIDE 2 MG/ML
0.5 INJECTION INTRAMUSCULAR; INTRAVENOUS; SUBCUTANEOUS EVERY 4 HOURS
Refills: 0 | Status: DISCONTINUED | OUTPATIENT
Start: 2022-01-15 | End: 2022-01-15

## 2022-01-15 RX ORDER — ACETAMINOPHEN 500 MG
1000 TABLET ORAL ONCE
Refills: 0 | Status: COMPLETED | OUTPATIENT
Start: 2022-01-15 | End: 2022-01-15

## 2022-01-15 RX ORDER — HYDROMORPHONE HYDROCHLORIDE 2 MG/ML
0.5 INJECTION INTRAMUSCULAR; INTRAVENOUS; SUBCUTANEOUS EVERY 6 HOURS
Refills: 0 | Status: DISCONTINUED | OUTPATIENT
Start: 2022-01-15 | End: 2022-01-15

## 2022-01-15 RX ORDER — INSULIN GLARGINE 100 [IU]/ML
35 INJECTION, SOLUTION SUBCUTANEOUS AT BEDTIME
Refills: 0 | Status: DISCONTINUED | OUTPATIENT
Start: 2022-01-15 | End: 2022-01-16

## 2022-01-15 RX ADMIN — HYDROMORPHONE HYDROCHLORIDE 0.5 MILLIGRAM(S): 2 INJECTION INTRAMUSCULAR; INTRAVENOUS; SUBCUTANEOUS at 04:11

## 2022-01-15 RX ADMIN — Medication 9 UNIT(S): at 06:10

## 2022-01-15 RX ADMIN — Medication 400 MILLIGRAM(S): at 04:05

## 2022-01-15 RX ADMIN — GABAPENTIN 100 MILLIGRAM(S): 400 CAPSULE ORAL at 22:13

## 2022-01-15 RX ADMIN — HYDROMORPHONE HYDROCHLORIDE 0.5 MILLIGRAM(S): 2 INJECTION INTRAMUSCULAR; INTRAVENOUS; SUBCUTANEOUS at 08:35

## 2022-01-15 RX ADMIN — GABAPENTIN 100 MILLIGRAM(S): 400 CAPSULE ORAL at 13:03

## 2022-01-15 RX ADMIN — PANTOPRAZOLE SODIUM 40 MILLIGRAM(S): 20 TABLET, DELAYED RELEASE ORAL at 06:09

## 2022-01-15 RX ADMIN — Medication 1000 MILLIGRAM(S): at 20:18

## 2022-01-15 RX ADMIN — HEPARIN SODIUM 5000 UNIT(S): 5000 INJECTION INTRAVENOUS; SUBCUTANEOUS at 22:12

## 2022-01-15 RX ADMIN — GABAPENTIN 100 MILLIGRAM(S): 400 CAPSULE ORAL at 06:09

## 2022-01-15 RX ADMIN — Medication 1000 MILLIGRAM(S): at 04:11

## 2022-01-15 RX ADMIN — HEPARIN SODIUM 5000 UNIT(S): 5000 INJECTION INTRAVENOUS; SUBCUTANEOUS at 13:03

## 2022-01-15 RX ADMIN — DULOXETINE HYDROCHLORIDE 20 MILLIGRAM(S): 30 CAPSULE, DELAYED RELEASE ORAL at 11:25

## 2022-01-15 RX ADMIN — ATORVASTATIN CALCIUM 80 MILLIGRAM(S): 80 TABLET, FILM COATED ORAL at 22:14

## 2022-01-15 RX ADMIN — Medication 2: at 22:12

## 2022-01-15 RX ADMIN — Medication 3 MILLIGRAM(S): at 23:48

## 2022-01-15 RX ADMIN — Medication 400 MILLIGRAM(S): at 18:42

## 2022-01-15 RX ADMIN — SODIUM CHLORIDE 10 MILLILITER(S): 9 INJECTION INTRAMUSCULAR; INTRAVENOUS; SUBCUTANEOUS at 17:00

## 2022-01-15 RX ADMIN — CHLORHEXIDINE GLUCONATE 1 APPLICATION(S): 213 SOLUTION TOPICAL at 06:00

## 2022-01-15 RX ADMIN — Medication 25 MICROGRAM(S): at 06:09

## 2022-01-15 RX ADMIN — HEPARIN SODIUM 5000 UNIT(S): 5000 INJECTION INTRAVENOUS; SUBCUTANEOUS at 06:09

## 2022-01-15 RX ADMIN — Medication 9 UNIT(S): at 10:47

## 2022-01-15 RX ADMIN — HYDROMORPHONE HYDROCHLORIDE 0.5 MILLIGRAM(S): 2 INJECTION INTRAMUSCULAR; INTRAVENOUS; SUBCUTANEOUS at 04:01

## 2022-01-15 RX ADMIN — SENNA PLUS 2 TABLET(S): 8.6 TABLET ORAL at 22:13

## 2022-01-15 RX ADMIN — INSULIN GLARGINE 35 UNIT(S): 100 INJECTION, SOLUTION SUBCUTANEOUS at 22:12

## 2022-01-15 RX ADMIN — HYDROMORPHONE HYDROCHLORIDE 0.5 MILLIGRAM(S): 2 INJECTION INTRAMUSCULAR; INTRAVENOUS; SUBCUTANEOUS at 09:01

## 2022-01-15 RX ADMIN — Medication 6: at 17:16

## 2022-01-15 RX ADMIN — DAPTOMYCIN 124 MILLIGRAM(S): 500 INJECTION, POWDER, LYOPHILIZED, FOR SOLUTION INTRAVENOUS at 18:42

## 2022-01-15 RX ADMIN — Medication 9 UNIT(S): at 17:17

## 2022-01-15 RX ADMIN — Medication 8: at 06:10

## 2022-01-15 RX ADMIN — Medication 2: at 10:47

## 2022-01-15 NOTE — PROGRESS NOTE ADULT - ASSESSMENT
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   Initially underwent antibiotic cement spacer exchange, I&D and revision gastroc flap by PRS on 1/6.  Bacteremia cleared since 1/6.  Patient already had multiple recurrence and he had flank pus in his R knee.  Now s/p AKA on 1/10 - source control achieved.   Gallium scan negative for ICD infection and JOHN PAUL neg for vegetation on valve and leads.  Had hypotensive episode in OR and AKA closure postponed, likely due to volume depletion per SICU team.  Plan for AKA closure on Mon or Tue.    - cont daptomycin 600mg IV q24h  - Duration of antibiotics is 4 weeks from the last surgery to treat MRSA bacteremia   - Weekly labs: CMP, CBC, ESR, CRP, CK faxed to ID office at [phone]  - Patient to follow up with Dr. Casillas in 2 weeks ([address] [place] [address], [phone]), ID office will call patient to schedule     Team 2 will follow you.  Case d/w primary team.    Marta Ruffin MD, MS  Infectious Disease attending  work cell [phone]   For any questions during evening/weekend/holiday, please page ID on call

## 2022-01-15 NOTE — PROGRESS NOTE ADULT - SUBJECTIVE AND OBJECTIVE BOX
INTERVAL HPI/OVERNIGHT EVENTS:    Patient is a 66y old  Male who presents with a chief complaint of R Knee Pain (15 Kris 2022 12:13)      Pt reports the following symptoms:    CONSTITUTIONAL:  Negative fever or chills, feels well, good appetite  EYES:  Negative  blurry vision or double vision  CARDIOVASCULAR:  Negative for chest pain or palpitations  RESPIRATORY:  Negative for cough, wheezing, or SOB   GASTROINTESTINAL:  Negative for nausea, vomiting, diarrhea, constipation, or abdominal pain  GENITOURINARY:  Negative frequency, urgency or dysuria  NEUROLOGIC:  No headache, confusion, dizziness, lightheadedness    MEDICATIONS  (STANDING):  acetaminophen   IVPB .. 1000 milliGRAM(s) IV Intermittent once  atorvastatin 80 milliGRAM(s) Oral at bedtime  chlorhexidine 2% Cloths 1 Application(s) Topical <User Schedule>  DAPTOmycin IVPB 600 milliGRAM(s) IV Intermittent every 24 hours  dextrose 40% Gel 15 Gram(s) Oral once  dextrose 5%. 1000 milliLiter(s) (50 mL/Hr) IV Continuous <Continuous>  dextrose 5%. 1000 milliLiter(s) (100 mL/Hr) IV Continuous <Continuous>  dextrose 50% Injectable 25 Gram(s) IV Push once  dextrose 50% Injectable 12.5 Gram(s) IV Push once  dextrose 50% Injectable 25 Gram(s) IV Push once  DULoxetine 20 milliGRAM(s) Oral daily  gabapentin 100 milliGRAM(s) Oral three times a day  glucagon  Injectable 1 milliGRAM(s) IntraMuscular once  heparin   Injectable 5000 Unit(s) SubCutaneous every 8 hours  insulin glargine Injectable (LANTUS) 35 Unit(s) SubCutaneous at bedtime  insulin lispro (ADMELOG) corrective regimen sliding scale   SubCutaneous Before meals and at bedtime  insulin lispro Injectable (ADMELOG) 9 Unit(s) SubCutaneous three times a day before meals  levothyroxine 25 MICROGram(s) Oral daily  pantoprazole    Tablet 40 milliGRAM(s) Oral before breakfast  senna 2 Tablet(s) Oral at bedtime    MEDICATIONS  (PRN):  sodium chloride 0.9% lock flush 10 milliLiter(s) IV Push every 1 hour PRN Pre/post blood products, medications, blood draw, and to maintain line patency      PHYSICAL EXAM  Vital Signs Last 24 Hrs  T(C): 37.2 (15 Kris 2022 14:55), Max: 37.2 (15 Kris 2022 14:55)  T(F): 98.9 (15 Kris 2022 14:55), Max: 98.9 (15 Kris 2022 14:55)  HR: 72 (15 Kris 2022 13:00) (51 - 89)  BP: 102/81 (15 Kris 2022 13:00) (82/51 - 111/84)  BP(mean): 86 (15 Kris 2022 13:00) (60 - 91)  RR: 15 (15 Kris 2022 13:00) (12 - 20)  SpO2: 96% (15 Kris 2022 13:00) (95% - 100%)    Constitutional: NAD.   HEENT: NCAT, MMM, OP clear, EOMI, , no proptosis or lid retraction  Neck: no thyromegaly or palpable thyroid nodules   Respiratory: lungs CTAB.  Cardiovascular: regular rhythm, normal S1 and S2, no audible murmurs, no peripheral edema  GI: soft, NT/ND, no masses/HSM appreciated.  Neurology: no tremors, DTR 2+  Skin: no visible rashes/lesions  Psychiatric: AAO x 3, normal affect/mood.    LABS:                        10.1   15.66 )-----------( 388      ( 15 Kris 2022 05:35 )             32.9     01-15    131<L>  |  95<L>  |  18  ----------------------------<  345<H>  4.6   |  25  |  1.59<H>    Ca    8.5      15 Kris 2022 05:35  Phos  3.2     01-15  Mg     2.2     01-15    TPro  6.5  /  Alb  2.8<L>  /  TBili  1.2  /  DBili  x   /  AST  31  /  ALT  25  /  AlkPhos  104  01-14    PT/INR - ( 14 Jan 2022 10:22 )   PT: 14.9 sec;   INR: 1.26          PTT - ( 14 Jan 2022 10:22 )  PTT:25.7 sec    Thyroid Stimulating Hormone, Serum: 2.850 uIU/mL (01-08 @ 08:25)  Thyroid Stimulating Hormone, Serum: 1.68 uIU/mL (03-12 @ 17:00)      HbA1C:         Insulin Sliding Scale requirements X 24 Hours:      RADIOLOGY & ADDITIONAL TESTS:      Assessment and Plan:   · Assessment	  66M hx of CAD with CABG , CHF (EF 20-25%) with AICD, T2DM c/b neuropathy with hx of diabetic ulcer, HLD. HTN, COPD, diverticulitis, celiac disease, anxiety and depression, cholecystostomy, R hip replacement 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 Castro (explant and abx spacer exchange), transferred here from [facility] for recurrent PJI for repeat spacer exchange and possible flap coverage with Dr. Vaughn/Dr. Bowen. Pt has been experiencing atraumatic worsening R knee pain x 1 month. Notes he went to a procedure center where his knee was aspirated 3 weeks ago. Pt was seen at [facility] yesterday where his knee was aspirated with 283k cell count. WBC 14, ESR 78, +, AVSS. Pt started on vancomycin + rifampin. 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 Now s/p Replacement, antibiotic spacer 06-Jan-2022. Also with hyperglycemia.  now s/p R AKA on vascular service     A1C: 9.0%  Weight: 97kg BMI 28  Cr/GFR: 0.9/104  EF: 20-25%     Problem/Plan - 1:  ·  Problem: Type 2 diabetes mellitus.   ·  Plan: Please increase lantus  to 35  units at night .  Please increase lispro to 9  units before each meal.  Please continue lispro moderate dose sliding scale four times daily with meals and at bedtime    Pt's fingerstick glucose goal is 100-180.    Will continue to monitor     For discharge, pt can continue    Pt can follow up at discharge with Dr Schmitt at scheduled appt. 2/8.  Will discuss case with Dr. Luevano    and update primary team.   INTERVAL HPI/OVERNIGHT EVENTS:    Patient is a 66y old  Male who presents with a chief complaint of R Knee Pain (15 Kris 2022 12:13)  Patient noted to have elevated AM blood sugars. Appears to have had visitors overnight and was given muffin and coffee which contributed to rise in blood sugar that he is aware of. He is also having glucerna with meals.   FSG & Insulin received:    Yesterday:  pre-dinner fs  nutritional lispro  9 units + 2  units lispro SS  bedtime fs  lantus 32  units + 4   units lispro SS    Today:  pre-breakfast fs  nutritional lispro  9 units+ 8   units lispro SS  pre-lunch fs  nutritional lispro   units+   units lispro SS    Pt reports the following symptoms:    CONSTITUTIONAL:  Negative fever or chills, feels well, good appetite  EYES:  Negative  blurry vision or double vision  CARDIOVASCULAR:  Negative for chest pain or palpitations  RESPIRATORY:  Negative for cough, wheezing, or SOB   GASTROINTESTINAL:  Negative for nausea, vomiting, diarrhea, constipation, or abdominal pain  GENITOURINARY:  Negative frequency, urgency or dysuria  NEUROLOGIC:  No headache, confusion, dizziness, lightheadedness    MEDICATIONS  (STANDING):  acetaminophen   IVPB .. 1000 milliGRAM(s) IV Intermittent once  atorvastatin 80 milliGRAM(s) Oral at bedtime  chlorhexidine 2% Cloths 1 Application(s) Topical <User Schedule>  DAPTOmycin IVPB 600 milliGRAM(s) IV Intermittent every 24 hours  dextrose 40% Gel 15 Gram(s) Oral once  dextrose 5%. 1000 milliLiter(s) (50 mL/Hr) IV Continuous <Continuous>  dextrose 5%. 1000 milliLiter(s) (100 mL/Hr) IV Continuous <Continuous>  dextrose 50% Injectable 25 Gram(s) IV Push once  dextrose 50% Injectable 12.5 Gram(s) IV Push once  dextrose 50% Injectable 25 Gram(s) IV Push once  DULoxetine 20 milliGRAM(s) Oral daily  gabapentin 100 milliGRAM(s) Oral three times a day  glucagon  Injectable 1 milliGRAM(s) IntraMuscular once  heparin   Injectable 5000 Unit(s) SubCutaneous every 8 hours  insulin glargine Injectable (LANTUS) 35 Unit(s) SubCutaneous at bedtime  insulin lispro (ADMELOG) corrective regimen sliding scale   SubCutaneous Before meals and at bedtime  insulin lispro Injectable (ADMELOG) 9 Unit(s) SubCutaneous three times a day before meals  levothyroxine 25 MICROGram(s) Oral daily  pantoprazole    Tablet 40 milliGRAM(s) Oral before breakfast  senna 2 Tablet(s) Oral at bedtime    MEDICATIONS  (PRN):  sodium chloride 0.9% lock flush 10 milliLiter(s) IV Push every 1 hour PRN Pre/post blood products, medications, blood draw, and to maintain line patency      PHYSICAL EXAM  Vital Signs Last 24 Hrs  T(C): 37.2 (15 Kris 2022 14:55), Max: 37.2 (15 Kris 2022 14:55)  T(F): 98.9 (15 Kris 2022 14:55), Max: 98.9 (15 Kris 2022 14:55)  HR: 72 (15 Kris 2022 13:00) (51 - 89)  BP: 102/81 (15 Kris 2022 13:00) (82/51 - 111/84)  BP(mean): 86 (15 Kris 2022 13:00) (60 - 91)  RR: 15 (15 Kris 2022 13:00) (12 - 20)  SpO2: 96% (15 Kris 2022 13:00) (95% - 100%)    Constitutional: NAD.   HEENT: NCAT, MMM, OP clear, EOMI, , no proptosis or lid retraction  Neck: no thyromegaly or palpable thyroid nodules   Respiratory: lungs CTAB.  Cardiovascular: regular rhythm, normal S1 and S2, no audible murmurs, no peripheral edema  GI: soft, NT/ND, no masses/HSM appreciated.  Neurology: no tremors, DTR 2+  Skin: , R AKA  Psychiatric: AAO x 3, normal affect/mood.    LABS:                        10.1   15.66 )-----------( 388      ( 15 Kris 2022 05:35 )             32.9     01-15    131<L>  |  95<L>  |  18  ----------------------------<  345<H>  4.6   |  25  |  1.59<H>    Ca    8.5      15 Kris 2022 05:35  Phos  3.2     01-15  Mg     2.2     01-15    TPro  6.5  /  Alb  2.8<L>  /  TBili  1.2  /  DBili  x   /  AST  31  /  ALT  25  /  AlkPhos  104  -    PT/INR - ( 2022 10:22 )   PT: 14.9 sec;   INR: 1.26          PTT - ( 2022 10:22 )  PTT:25.7 sec    Thyroid Stimulating Hormone, Serum: 2.850 uIU/mL ( @ 08:25)  Thyroid Stimulating Hormone, Serum: 1.68 uIU/mL ( @ 17:00)      HbA1C:         Insulin Sliding Scale requirements X 24 Hours:      RADIOLOGY & ADDITIONAL TESTS:      Assessment and Plan:   · Assessment	  66M hx of CAD with CABG , CHF (EF 20-25%) with AICD, T2DM c/b neuropathy with hx of diabetic ulcer, HLD. HTN, COPD, diverticulitis, celiac disease, anxiety and depression, cholecystostomy, R hip replacement R TKA several years ago c/b recurrent PJI and revisions, most recently in 2020 by Dr. Castro (explant and abx spacer exchange), transferred here from [facility] for recurrent PJI for repeat spacer exchange and possible flap coverage with Dr. Vaughn/Dr. Bowen. Pt has been experiencing atraumatic worsening R knee pain x 1 month. Notes he went to a procedure center where his knee was aspirated 3 weeks ago. Pt was seen at [facility] yesterday where his knee was aspirated with 283k cell count. WBC 14, ESR 78, +, AVSS. Pt started on vancomycin + rifampin. 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 Now s/p Replacement, antibiotic spacer 2022. Also with hyperglycemia.  now s/p R AKA on vascular service     A1C: 9.0%  Weight: 97kg BMI 28  Cr/GFR: 0.9/104  EF: 20-25%     Problem/Plan - 1:  ·  Problem: Type 2 diabetes mellitus.   ·  Plan: Please increase lantus  to 35  units at night .  Determined from prior nights of continued elevated AM glucose  Please increase lispro to 9  units before each meal.  Please continue lispro moderate dose sliding scale four times daily with meals and at bedtime    Pt's fingerstick glucose goal is 100-180.    Will continue to monitor     For discharge, pt can continue...TBD    Pt can follow up at discharge with Dr Schmitt at scheduled appt. .  Will discuss case with Dr. Luevano    and update primary team.

## 2022-01-15 NOTE — PROGRESS NOTE ADULT - SUBJECTIVE AND OBJECTIVE BOX
S: No new issues/events overnight, no new med c/o    O: ICU Vital Signs Last 24 Hrs  T(F): 98.8 (01-15-22 @ 05:29), Max: 98.9 (01-14-22 @ 16:40)  HR: 65 (01-15-22 @ 09:00) (51 - 89)  BP: 82/51 (01-15-22 @ 09:00) (82/51 - 152/67)  BP(mean): 60 (01-15-22 @ 09:00) (60 - 97)  ABP: 94/54 (01-15-22 @ 06:00)  RR: 16 (01-15-22 @ 09:00) (10 - 20)  SpO2: 98% (01-15-22 @ 09:00) (95% - 100%)    PHYSICAL EXAM:   Neurological: AAOx3, CNII-XII intact,  strength 5/5 b/l  ENT: mucus membrane moist  Cardiovascular: RRR  Respiratory: CTA  Gastrointestinal: soft, NT, ND, BS+  Extremities: warm, no dependent edema, right AKA stump in ACE dsg. clean, no bleeding.   Vascular: no cyanosis/erythema  Skin: no rashes  MSK: no joint swelling.     LABS:    01-15    131<L>  |  95<L>  |  18  ----------------------------<  345<H>  4.6   |  25  |  1.59<H>    Ca    8.5      15 Kris 2022 05:35  Phos  3.2     01-15  Mg     2.2     01-15    TPro  6.5  /  Alb  2.8<L>  /  TBili  1.2  /  DBili  x   /  AST  31  /  ALT  25  /  AlkPhos  104  01-14  LIVER FUNCTIONS - ( 14 Jan 2022 10:22 )  Alb: 2.8 g/dL / Pro: 6.5 g/dL / ALK PHOS: 104 U/L / ALT: 25 U/L / AST: 31 U/L / GGT: x                               10.1   15.66 )-----------( 388      ( 15 Kris 2022 05:35 )             32.9   PT/INR - ( 14 Jan 2022 10:22 )   PT: 14.9 sec;   INR: 1.26          PTT - ( 14 Jan 2022 10:22 )  PTT:25.7 secCARDIAC MARKERS ( 14 Jan 2022 10:49 )  x     / x     / 133 U/L / x     / x      CARDIAC MARKERS ( 14 Jan 2022 10:22 )  x     / 0.03 ng/mL / x     / x     / x        ABG - ( 14 Jan 2022 10:23 )  pH, Arterial: 7.50  pH, Blood: x     /  pCO2: 36    /  pO2: 245   / HCO3: 28    / Base Excess: 4.9   /  SaO2: 99.3            CAPILLARY BLOOD GLUCOSE      POCT Blood Glucose.: 308 mg/dL (15 Kris 2022 05:57)  POCT Blood Glucose.: 238 mg/dL (14 Jan 2022 21:50)  POCT Blood Glucose.: 182 mg/dL (14 Jan 2022 16:19)  POCT Blood Glucose.: 247 mg/dL (14 Jan 2022 10:07)    MEDICATIONS  (STANDING):  atorvastatin 80 milliGRAM(s) Oral at bedtime  chlorhexidine 2% Cloths 1 Application(s) Topical <User Schedule>  DAPTOmycin IVPB 600 milliGRAM(s) IV Intermittent every 24 hours  DULoxetine 20 milliGRAM(s) Oral daily  gabapentin 100 milliGRAM(s) Oral three times a day  glucagon  Injectable 1 milliGRAM(s) IntraMuscular once  heparin   Injectable 5000 Unit(s) SubCutaneous every 8 hours  insulin glargine Injectable (LANTUS) 32 Unit(s) SubCutaneous at bedtime  insulin lispro (ADMELOG) corrective regimen sliding scale   SubCutaneous Before meals and at bedtime  insulin lispro Injectable (ADMELOG) 9 Unit(s) SubCutaneous three times a day before meals  levothyroxine 25 MICROGram(s) Oral daily  pantoprazole    Tablet 40 milliGRAM(s) Oral before breakfast  senna 2 Tablet(s) Oral at bedtime    MEDICATIONS  (PRN):  HYDROmorphone  Injectable 0.5 milliGRAM(s) IV Push every 6 hours PRN Severe Pain (7 - 10)  sodium chloride 0.9% lock flush 10 milliLiter(s) IV Push every 1 hour PRN Pre/post blood products, medications, blood draw, and to maintain line patency      Loja:	  [ ] None	[x ] Daily Loja Order Placed	   Indication:	  [x ] Strict I and O's    [ ] Obstruction     [ ] Incontinence + Stage 3 or 4 Decubitus  Central Line:  [ ] None	   [x ]  Medication / TPN Administration     [ ] No Peripheral IV

## 2022-01-15 NOTE — PROGRESS NOTE ADULT - SUBJECTIVE AND OBJECTIVE BOX
INFECTIOUS DISEASES CONSULT FOLLOW-UP NOTE    INTERVAL HPI/OVERNIGHT EVENTS:  No event overnight  patient feels well, denied any symptoms    ROS:   Constitutional, eyes, ENT, cardiovascular, respiratory, gastrointestinal, genitourinary, integumentary, neurological, psychiatric and heme/lymph are otherwise negative other than noted above       ANTIBIOTICS/RELEVANT:    MEDICATIONS  (STANDING):  atorvastatin 80 milliGRAM(s) Oral at bedtime  chlorhexidine 2% Cloths 1 Application(s) Topical <User Schedule>  DAPTOmycin IVPB 600 milliGRAM(s) IV Intermittent every 24 hours  dextrose 40% Gel 15 Gram(s) Oral once  dextrose 5%. 1000 milliLiter(s) (50 mL/Hr) IV Continuous <Continuous>  dextrose 5%. 1000 milliLiter(s) (100 mL/Hr) IV Continuous <Continuous>  dextrose 50% Injectable 25 Gram(s) IV Push once  dextrose 50% Injectable 12.5 Gram(s) IV Push once  dextrose 50% Injectable 25 Gram(s) IV Push once  DULoxetine 20 milliGRAM(s) Oral daily  gabapentin 100 milliGRAM(s) Oral three times a day  glucagon  Injectable 1 milliGRAM(s) IntraMuscular once  heparin   Injectable 5000 Unit(s) SubCutaneous every 8 hours  insulin glargine Injectable (LANTUS) 32 Unit(s) SubCutaneous at bedtime  insulin lispro (ADMELOG) corrective regimen sliding scale   SubCutaneous Before meals and at bedtime  insulin lispro Injectable (ADMELOG) 9 Unit(s) SubCutaneous three times a day before meals  levothyroxine 25 MICROGram(s) Oral daily  pantoprazole    Tablet 40 milliGRAM(s) Oral before breakfast  senna 2 Tablet(s) Oral at bedtime    MEDICATIONS  (PRN):  HYDROmorphone  Injectable 0.5 milliGRAM(s) IV Push every 6 hours PRN Severe Pain (7 - 10)  sodium chloride 0.9% lock flush 10 milliLiter(s) IV Push every 1 hour PRN Pre/post blood products, medications, blood draw, and to maintain line patency        Vital Signs Last 24 Hrs  T(C): 37.1 (15 Kris 2022 05:29), Max: 37.2 (14 Jan 2022 16:40)  T(F): 98.8 (15 Kris 2022 05:29), Max: 98.9 (14 Jan 2022 16:40)  HR: 85 (15 Kris 2022 11:00) (51 - 89)  BP: 106/56 (15 Kris 2022 11:00) (82/51 - 124/52)  BP(mean): 75 (15 Kris 2022 11:00) (60 - 91)  RR: 15 (15 Kris 2022 11:00) (10 - 20)  SpO2: 97% (15 Kirs 2022 11:00) (95% - 100%)    01-14-22 @ 07:01  -  01-15-22 @ 07:00  --------------------------------------------------------  IN: 665.2 mL / OUT: 1760 mL / NET: -1094.8 mL    01-15-22 @ 07:01  -  01-15-22 @ 12:13  --------------------------------------------------------  IN: 500 mL / OUT: 380 mL / NET: 120 mL      PHYSICAL EXAM:  Constitutional: alert, NAD  Eyes: the sclera and conjunctiva were normal.   ENT: the ears and nose were normal in appearance.   Neck: the appearance of the neck was normal and the neck was supple.   Pulmonary: no respiratory distress and lungs were clear to auscultation bilaterally.   Heart: heart rate was normal and rhythm regular, normal S1 and S2  Ext: R AKA  Abdomen: normal bowel sounds, soft, non-tender  Neurological: no focal deficits.   Psychiatric: the affect was normal        LABS:                        10.1   15.66 )-----------( 388      ( 15 Kris 2022 05:35 )             32.9     01-15    131<L>  |  95<L>  |  18  ----------------------------<  345<H>  4.6   |  25  |  1.59<H>    Ca    8.5      15 Kris 2022 05:35  Phos  3.2     01-15  Mg     2.2     01-15    TPro  6.5  /  Alb  2.8<L>  /  TBili  1.2  /  DBili  x   /  AST  31  /  ALT  25  /  AlkPhos  104  01-14    PT/INR - ( 14 Jan 2022 10:22 )   PT: 14.9 sec;   INR: 1.26          PTT - ( 14 Jan 2022 10:22 )  PTT:25.7 sec      MICROBIOLOGY:  1/7 BCx ngtd  1/6 BCx ngtd  1/5 BCx MRSA  1/5 BCx MRSA  1/5 Synovial fluid: MRSA      RADIOLOGY & ADDITIONAL STUDIES:  JOHN PAUL 1/12  CONCLUSIONS:     1. Severely reduced left ventricular systolic function.   2. Normal right ventricular size.   3. Reduced right ventricular systolic function.   4. Dilated left atrium.   5. No LA/RA/VERONICA/RAA thrombus seen.   6. No evidence of an intracardiac shunt.   7. Mild mitral regurgitation.   8. Mild tricuspid regurgitation.   9. Noechocardiographic evidence of vegetations on the valves or device   lead .  10. No evidence of pulmonary hypertension.  11. No pericardial effusion.  12. Compared to the previous JOHN PAUL performed on 7/21/2020, there have been   no significant interval changes.      Gallium scan  Impression: No evidence of ICD infection is noted. There is activity on   the medial aspect of the right leg just above the knee which may be   related to patient's recent surgery.    Please note that gallium is insensitive to possible infection at the tip   of the ICD wires. Transesophageal echocardiography is pending.

## 2022-01-15 NOTE — PROGRESS NOTE ADULT - SUBJECTIVE AND OBJECTIVE BOX
STATUS POST: right guillotine AKA     SUBJECTIVE: Patient seen and examined bedside by chief resident. No acute complaints    DAPTOmycin IVPB 600 milliGRAM(s) IV Intermittent every 24 hours  heparin   Injectable 5000 Unit(s) SubCutaneous every 8 hours      Vital Signs Last 24 Hrs  T(C): 36.9 (15 Kris 2022 18:48), Max: 37.2 (15 Kris 2022 14:55)  T(F): 98.5 (15 Kris 2022 18:48), Max: 98.9 (15 Kris 2022 14:55)  HR: 72 (15 Kris 2022 13:00) (65 - 89)  BP: 102/81 (15 Kris 2022 13:00) (82/51 - 111/84)  BP(mean): 86 (15 Kris 2022 13:00) (60 - 91)  RR: 15 (15 Kris 2022 13:00) (12 - 20)  SpO2: 96% (15 Kris 2022 13:00) (95% - 100%)  I&O's Detail    14 Jan 2022 07:01  -  15 Kris 2022 07:00  --------------------------------------------------------  IN:    IV PiggyBack: 150 mL    Norepinephrine: 2.6 mL    Oral Fluid: 500 mL    Propofol: 12.6 mL  Total IN: 665.2 mL    OUT:    Indwelling Catheter - Urethral (mL): 1760 mL  Total OUT: 1760 mL    Total NET: -1094.8 mL      15 Kris 2022 07:01  -  15 Kris 2022 19:03  --------------------------------------------------------  IN:    Oral Fluid: 500 mL  Total IN: 500 mL    OUT:    Indwelling Catheter - Urethral (mL): 415 mL  Total OUT: 415 mL    Total NET: 85 mL          Physical Exam:  Gen: NAD, resting comfortably in bed  C/V: NSR  Pulm: Nonlabored breathing, no respiratory distress  Abd: soft, NT/ND  Extrem: RLE AKA  wound was clean, dry and intact. healthy tissue, pink and well perfused. minimal bleeding, Xeroform, gauze, kerlix and ace wraps used to redress stump.    LABS:                        10.1   15.66 )-----------( 388      ( 15 Kris 2022 05:35 )             32.9     01-15    131<L>  |  95<L>  |  18  ----------------------------<  345<H>  4.6   |  25  |  1.59<H>    Ca    8.5      15 Kris 2022 05:35  Phos  3.2     01-15  Mg     2.2     01-15    TPro  6.5  /  Alb  2.8<L>  /  TBili  1.2  /  DBili  x   /  AST  31  /  ALT  25  /  AlkPhos  104  01-14    PT/INR - ( 14 Jan 2022 10:22 )   PT: 14.9 sec;   INR: 1.26          PTT - ( 14 Jan 2022 10:22 )  PTT:25.7 sec      RADIOLOGY & ADDITIONAL STUDIES:        ---------------------------------------------------------------------------  PLEASE CHECK WHEN PRESENT:  [ x ] Heart Failure  [  ] Acute  [  ] Acute on Chronic  [x  ] Chronic  -------------------------------------------------------------------  [  ]Diastolic [HFpEF]  [x  ]Systolic [HFrEF]  [  ]Combined [HFpEF & HFrEF]  [  ] afib  [x  ] hypertensive heart disease  [  ]Other:  -------------------------------------------------------------------  [ ] Respiratory failure  [ ] Acute cor pulmonale  [ ] Asthma/COPD Exacerbation  [ ] Pleural effusion  [ ] Aspiration pneumonia  -------------------------------------------------------------------  [  ]MOISES  [  ]ATN  [  ]Reneal Medullary Necrosis  [  ]Renal Cortical Necrosis  [  ]Other Pathological Lesions:    [  ]CKD 1  [  ]CKD 2  [  ]CKD 3  [  ]CKD 4  [  ]CKD 5  [  ]Other  -------------------------------------------------------------------  [ x ]Diabetes  [  ] Diabetic PVD Ulcer  [  ] Neuropathic ulcer to DM  [  ] Diabetes with Nephropathy  [  ] Osteomyelitis due to diabetes  --------------------------------------------------------------------  [  ]Malnutrition: See Nutrition Note  [  ]Cachexia  [  ]Other:   [  ]Supplement Ordered:  [  ]Morbid Obesity (BMI >=40]  ---------------------------------------------------------------------  [ ] Sepsis/severe sepsis/septic shock  [ ] UTI  [ ] Pneumonia  -----------------------------------------------------------------------  [ ] Acidosis/alkalosis  [ ] Fluid overload  [ ] Hypokalemia  [ ] Hyperkalemia  [ ] Hypomagnesemia  [ ] Hypophosphatemia  [ ] Hyperphosphatemia  ------------------------------------------------------------------------  [ ] Acute blood loss anemia  [ ] Post op blood loss anemia  [ ] Iron deficiency anemia  [ ] Anemia due to chronic disease  [ ] Hypercoagulable state  ----------------------------------------------------------------------  [ ] Cerebral infarction  [ ] Transient ischemia attack  [ ] Encephalopathy        66yMale Hx CAD, CAD s/p MIDCAB/VERONICA 2018, AICD (2/2020), CHF (EF 15-20%), DM, 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 Castro (explant and abx spacer exchange), transferred here on 1/6 from [facility] for MRSA bacteremia due to recurrent PJI. He was taken to OR with ortho on Revision gastroc flap by PRS, antibiotic cement spacer removal, I&D, replacement on 1/6, then taken to OR with vascular on 1/10 for right AKA. taken to OR for completion AKA today, but hypotensive on induction, required pressors, procedure aborted, kept intubated and sent to SICU. Extubated in SICU on 1/14 and SDU today. Dc'ed Dilaudid PRN due to low BP. Donnie in place to monitor I/Os    Neuro: Tylenol for pain  CV: echo 1/6 EF 15-20%, PASP 37, AICD, OFF levophed. stable.   Pulm: room air  GI/FEN: diabetic diet, protonix. Senna.   : donnie, slight MOISES Cr uptrending  ID: Daptomycin (1/13-) for MRSA septic joint   Heme: HSQ  Endo: ISS, lispro 9, lantus 35, synthroid  Wounds/drains: left AKA kirill (dressing by vascular daily),   LINES: R PICC (1/11-), L midline (1/14--)  right radial a-line (1/14--), PIVs  Dispo: SDU

## 2022-01-15 NOTE — PROGRESS NOTE ADULT - ASSESSMENT
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 Castro (explant and abx spacer exchange), transferred here on 1/6 from [facility] for MRSA bacteremia due to recurrent PJI. He was taken to OR with ortho on Revision gastroc flap by PRS, antibiotic cement spacer removal, I&D, replacement on 1/6, then taken to OR with vascular on 1/10 for right AKA. taken to OR for completion AKA today, but hypotensive on induction, required pressors, procedure aborted, kept intubated and sent to SICU.     Neuro: dilaudid PRN pain  CV: echo 1/6 EF 15-20%, PASP 37, AICD, BP stable without pressors. holding lasix/aldactone for now. Cards appreciated.   Pulm: no resp distress on room air  GI/FEN: diabetic diet with Glucerna, protonix. Senna.   : fields, slight MOISES, making adequate urine.   ID: Daptomycin (1/13-) for MRSA septic joint, ID appreciated. CPK normal.   Heme: HSQ  Endo: ISS, lispro 9, lantus 32, synthroid, Endo appreciated.   Wounds/drains: left AKA guillotine (dressing by vascular daily),   LINES: R PICC (1/11-), L midline (1/14--)  right radial a-line (1/14--), PIVs

## 2022-01-16 LAB
ANION GAP SERPL CALC-SCNC: 9 MMOL/L — SIGNIFICANT CHANGE UP (ref 5–17)
BUN SERPL-MCNC: 16 MG/DL — SIGNIFICANT CHANGE UP (ref 7–23)
CALCIUM SERPL-MCNC: 8.5 MG/DL — SIGNIFICANT CHANGE UP (ref 8.4–10.5)
CHLORIDE SERPL-SCNC: 102 MMOL/L — SIGNIFICANT CHANGE UP (ref 96–108)
CO2 SERPL-SCNC: 28 MMOL/L — SIGNIFICANT CHANGE UP (ref 22–31)
CREAT SERPL-MCNC: 1.27 MG/DL — SIGNIFICANT CHANGE UP (ref 0.5–1.3)
GLUCOSE BLDC GLUCOMTR-MCNC: 161 MG/DL — HIGH (ref 70–99)
GLUCOSE BLDC GLUCOMTR-MCNC: 178 MG/DL — HIGH (ref 70–99)
GLUCOSE BLDC GLUCOMTR-MCNC: 208 MG/DL — HIGH (ref 70–99)
GLUCOSE BLDC GLUCOMTR-MCNC: 322 MG/DL — HIGH (ref 70–99)
GLUCOSE SERPL-MCNC: 218 MG/DL — HIGH (ref 70–99)
HCT VFR BLD CALC: 30.5 % — LOW (ref 39–50)
HGB BLD-MCNC: 9.5 G/DL — LOW (ref 13–17)
MAGNESIUM SERPL-MCNC: 1.9 MG/DL — SIGNIFICANT CHANGE UP (ref 1.6–2.6)
MCHC RBC-ENTMCNC: 24.9 PG — LOW (ref 27–34)
MCHC RBC-ENTMCNC: 31.1 GM/DL — LOW (ref 32–36)
MCV RBC AUTO: 80.1 FL — SIGNIFICANT CHANGE UP (ref 80–100)
NRBC # BLD: 0 /100 WBCS — SIGNIFICANT CHANGE UP (ref 0–0)
PHOSPHATE SERPL-MCNC: 3.4 MG/DL — SIGNIFICANT CHANGE UP (ref 2.5–4.5)
PLATELET # BLD AUTO: 461 K/UL — HIGH (ref 150–400)
POTASSIUM SERPL-MCNC: 4 MMOL/L — SIGNIFICANT CHANGE UP (ref 3.5–5.3)
POTASSIUM SERPL-SCNC: 4 MMOL/L — SIGNIFICANT CHANGE UP (ref 3.5–5.3)
RBC # BLD: 3.81 M/UL — LOW (ref 4.2–5.8)
RBC # FLD: 18.1 % — HIGH (ref 10.3–14.5)
SODIUM SERPL-SCNC: 139 MMOL/L — SIGNIFICANT CHANGE UP (ref 135–145)
WBC # BLD: 14.59 K/UL — HIGH (ref 3.8–10.5)
WBC # FLD AUTO: 14.59 K/UL — HIGH (ref 3.8–10.5)

## 2022-01-16 PROCEDURE — 99232 SBSQ HOSP IP/OBS MODERATE 35: CPT

## 2022-01-16 PROCEDURE — 99232 SBSQ HOSP IP/OBS MODERATE 35: CPT | Mod: GC

## 2022-01-16 RX ORDER — OXYCODONE AND ACETAMINOPHEN 5; 325 MG/1; MG/1
1 TABLET ORAL EVERY 6 HOURS
Refills: 0 | Status: DISCONTINUED | OUTPATIENT
Start: 2022-01-16 | End: 2022-01-18

## 2022-01-16 RX ORDER — INSULIN GLARGINE 100 [IU]/ML
40 INJECTION, SOLUTION SUBCUTANEOUS AT BEDTIME
Refills: 0 | Status: DISCONTINUED | OUTPATIENT
Start: 2022-01-16 | End: 2022-01-17

## 2022-01-16 RX ORDER — GABAPENTIN 400 MG/1
200 CAPSULE ORAL THREE TIMES A DAY
Refills: 0 | Status: DISCONTINUED | OUTPATIENT
Start: 2022-01-16 | End: 2022-01-19

## 2022-01-16 RX ORDER — ACETAMINOPHEN 500 MG
650 TABLET ORAL EVERY 6 HOURS
Refills: 0 | Status: DISCONTINUED | OUTPATIENT
Start: 2022-01-16 | End: 2022-01-18

## 2022-01-16 RX ORDER — DULOXETINE HYDROCHLORIDE 30 MG/1
20 CAPSULE, DELAYED RELEASE ORAL
Refills: 0 | Status: DISCONTINUED | OUTPATIENT
Start: 2022-01-16 | End: 2022-01-27

## 2022-01-16 RX ORDER — HYDROMORPHONE HYDROCHLORIDE 2 MG/ML
0.25 INJECTION INTRAMUSCULAR; INTRAVENOUS; SUBCUTANEOUS ONCE
Refills: 0 | Status: DISCONTINUED | OUTPATIENT
Start: 2022-01-16 | End: 2022-01-16

## 2022-01-16 RX ORDER — ACETAMINOPHEN 500 MG
1000 TABLET ORAL ONCE
Refills: 0 | Status: COMPLETED | OUTPATIENT
Start: 2022-01-16 | End: 2022-01-16

## 2022-01-16 RX ADMIN — HEPARIN SODIUM 5000 UNIT(S): 5000 INJECTION INTRAVENOUS; SUBCUTANEOUS at 14:44

## 2022-01-16 RX ADMIN — INSULIN GLARGINE 40 UNIT(S): 100 INJECTION, SOLUTION SUBCUTANEOUS at 21:50

## 2022-01-16 RX ADMIN — GABAPENTIN 100 MILLIGRAM(S): 400 CAPSULE ORAL at 06:54

## 2022-01-16 RX ADMIN — Medication 8: at 17:29

## 2022-01-16 RX ADMIN — Medication 25 MICROGRAM(S): at 06:54

## 2022-01-16 RX ADMIN — HEPARIN SODIUM 5000 UNIT(S): 5000 INJECTION INTRAVENOUS; SUBCUTANEOUS at 06:53

## 2022-01-16 RX ADMIN — OXYCODONE AND ACETAMINOPHEN 1 TABLET(S): 5; 325 TABLET ORAL at 18:14

## 2022-01-16 RX ADMIN — DULOXETINE HYDROCHLORIDE 20 MILLIGRAM(S): 30 CAPSULE, DELAYED RELEASE ORAL at 22:25

## 2022-01-16 RX ADMIN — Medication 2: at 12:05

## 2022-01-16 RX ADMIN — HEPARIN SODIUM 5000 UNIT(S): 5000 INJECTION INTRAVENOUS; SUBCUTANEOUS at 22:25

## 2022-01-16 RX ADMIN — Medication 1000 MILLIGRAM(S): at 18:00

## 2022-01-16 RX ADMIN — HYDROMORPHONE HYDROCHLORIDE 0.25 MILLIGRAM(S): 2 INJECTION INTRAMUSCULAR; INTRAVENOUS; SUBCUTANEOUS at 08:10

## 2022-01-16 RX ADMIN — DULOXETINE HYDROCHLORIDE 20 MILLIGRAM(S): 30 CAPSULE, DELAYED RELEASE ORAL at 12:05

## 2022-01-16 RX ADMIN — GABAPENTIN 100 MILLIGRAM(S): 400 CAPSULE ORAL at 14:44

## 2022-01-16 RX ADMIN — Medication 9 UNIT(S): at 18:10

## 2022-01-16 RX ADMIN — Medication 400 MILLIGRAM(S): at 17:02

## 2022-01-16 RX ADMIN — Medication 650 MILLIGRAM(S): at 23:25

## 2022-01-16 RX ADMIN — PANTOPRAZOLE SODIUM 40 MILLIGRAM(S): 20 TABLET, DELAYED RELEASE ORAL at 06:54

## 2022-01-16 RX ADMIN — Medication 4: at 06:54

## 2022-01-16 RX ADMIN — Medication 650 MILLIGRAM(S): at 22:25

## 2022-01-16 RX ADMIN — HYDROMORPHONE HYDROCHLORIDE 0.25 MILLIGRAM(S): 2 INJECTION INTRAMUSCULAR; INTRAVENOUS; SUBCUTANEOUS at 07:50

## 2022-01-16 RX ADMIN — Medication 2: at 21:49

## 2022-01-16 RX ADMIN — CHLORHEXIDINE GLUCONATE 1 APPLICATION(S): 213 SOLUTION TOPICAL at 07:39

## 2022-01-16 RX ADMIN — Medication 9 UNIT(S): at 13:02

## 2022-01-16 RX ADMIN — GABAPENTIN 200 MILLIGRAM(S): 400 CAPSULE ORAL at 22:25

## 2022-01-16 RX ADMIN — DAPTOMYCIN 124 MILLIGRAM(S): 500 INJECTION, POWDER, LYOPHILIZED, FOR SOLUTION INTRAVENOUS at 18:11

## 2022-01-16 RX ADMIN — ATORVASTATIN CALCIUM 80 MILLIGRAM(S): 80 TABLET, FILM COATED ORAL at 22:25

## 2022-01-16 NOTE — PROGRESS NOTE ADULT - SUBJECTIVE AND OBJECTIVE BOX
INTERVAL HPI/OVERNIGHT EVENTS:    Patient is a 66y old  Male who presents with a chief complaint of R Knee Pain (16 Jan 2022 12:56)  Patient emotional today, was a bit frustated earlier. Reports he is having shooting pain in the legs. Reports having been on gabapentin and cymbalta at home on higher doses.    Pt reports the following symptoms:    CONSTITUTIONAL:  Negative fever or chills, feels well, good appetite  EYES:  Negative  blurry vision or double vision  CARDIOVASCULAR:  Negative for chest pain or palpitations  RESPIRATORY:  Negative for cough, wheezing, or SOB   GASTROINTESTINAL:  Negative for nausea, vomiting, diarrhea, constipation, or abdominal pain  GENITOURINARY:  Negative frequency, urgency or dysuria  NEUROLOGIC:  No headache, confusion, dizziness, lightheadedness    MEDICATIONS  (STANDING):  atorvastatin 80 milliGRAM(s) Oral at bedtime  chlorhexidine 2% Cloths 1 Application(s) Topical <User Schedule>  DAPTOmycin IVPB 600 milliGRAM(s) IV Intermittent every 24 hours  dextrose 40% Gel 15 Gram(s) Oral once  dextrose 5%. 1000 milliLiter(s) (50 mL/Hr) IV Continuous <Continuous>  dextrose 5%. 1000 milliLiter(s) (100 mL/Hr) IV Continuous <Continuous>  dextrose 50% Injectable 25 Gram(s) IV Push once  dextrose 50% Injectable 12.5 Gram(s) IV Push once  dextrose 50% Injectable 25 Gram(s) IV Push once  DULoxetine 20 milliGRAM(s) Oral daily  gabapentin 100 milliGRAM(s) Oral three times a day  glucagon  Injectable 1 milliGRAM(s) IntraMuscular once  heparin   Injectable 5000 Unit(s) SubCutaneous every 8 hours  insulin lispro (ADMELOG) corrective regimen sliding scale   SubCutaneous Before meals and at bedtime  insulin lispro Injectable (ADMELOG) 9 Unit(s) SubCutaneous three times a day before meals  levothyroxine 25 MICROGram(s) Oral daily  pantoprazole    Tablet 40 milliGRAM(s) Oral before breakfast  senna 2 Tablet(s) Oral at bedtime    MEDICATIONS  (PRN):  sodium chloride 0.9% lock flush 10 milliLiter(s) IV Push every 1 hour PRN Pre/post blood products, medications, blood draw, and to maintain line patency      PHYSICAL EXAM  Vital Signs Last 24 Hrs  T(C): 37 (16 Jan 2022 14:30), Max: 37 (16 Jan 2022 14:30)  T(F): 98.6 (16 Jan 2022 14:30), Max: 98.6 (16 Jan 2022 14:30)  HR: 83 (16 Jan 2022 14:50) (74 - 83)  BP: 109/59 (16 Jan 2022 14:50) (90/55 - 112/64)  BP(mean): --  RR: 18 (16 Jan 2022 14:50) (17 - 18)  SpO2: 98% (16 Jan 2022 14:50) (97% - 100%)    Constitutional: NAD.   HEENT: NCAT, MMM, OP clear, EOMI, , no proptosis or lid retraction  Neck: no thyromegaly or palpable thyroid nodules   Respiratory: lungs CTAB.  Cardiovascular: regular rhythm, normal S1 and S2, no audible murmurs, no peripheral edema  GI: soft, NT/ND, no masses/HSM appreciated.  Neurology: no tremors, DTR 2+  Skin: no visible rashes/lesions, R AKA  Psychiatric: AAO x 3, frustrated and emotional     LABS:                        9.5    14.59 )-----------( 461      ( 16 Jan 2022 06:57 )             30.5     01-16    139  |  102  |  16  ----------------------------<  218<H>  4.0   |  28  |  1.27    Ca    8.5      16 Jan 2022 06:57  Phos  3.4     01-16  Mg     1.9     01-16          Thyroid Stimulating Hormone, Serum: 2.850 uIU/mL (01-08 @ 08:25)  Thyroid Stimulating Hormone, Serum: 1.68 uIU/mL (03-12 @ 17:00)      HbA1C:         Insulin Sliding Scale requirements X 24 Hours:      RADIOLOGY & ADDITIONAL TESTS:      Assessment and Plan:   · Assessment	  66M hx of CAD with CABG , CHF (EF 20-25%) with AICD, T2DM c/b neuropathy with hx of diabetic ulcer, HLD. HTN, COPD, diverticulitis, celiac disease, anxiety and depression, cholecystostomy, R hip replacement 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 Castro (explant and abx spacer exchange), transferred here from [facility] for recurrent PJI for repeat spacer exchange and possible flap coverage with Dr. Vaughn/Dr. Bowen. Pt has been experiencing atraumatic worsening R knee pain x 1 month. Notes he went to a procedure center where his knee was aspirated 3 weeks ago. Pt was seen at [facility] yesterday where his knee was aspirated with 283k cell count. WBC 14, ESR 78, +, AVSS. Pt started on vancomycin + rifampin. 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 Now s/p Replacement, antibiotic spacer 06-Jan-2022. Also with hyperglycemia.  now s/p R AKA on vascular service     A1C: 9.0%  Weight: 97kg BMI 28  Cr/GFR: 0.9/104  EF: 20-25%     Problem/Plan - 1:  ·  Problem: Type 2 diabetes mellitus.   ·  Plan: Please increase lantus  to 40  units at night. reports did not eat after 10pm   Please keep lispro to 9  units before each meal.  Please continue lispro moderate dose sliding scale four times daily with meals and at bedtime    Pt's fingerstick glucose goal is 100-180.    Will continue to monitor     For discharge, pt can continue...TBD    Pt can follow up at discharge with Dr Schmitt at scheduled appt. 2/8.  Will discuss case with Dr. Luevano    and update primary team.

## 2022-01-16 NOTE — PROGRESS NOTE ADULT - ASSESSMENT
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  He is now s/p arthroscopic I&D in view of prior difficulties with his incision/wound and other co-morbidities.  Unfortunately, he was on cipro while this occurred - no organisms seen on initial gram stain.  Although infection at present may represent persistence from November (MRSA), cannot presume, particularly in view of ST compromise.  He has had extensive antibiotic exposure in the interim and has spent a long time on inpatient services.  He had transaminitis with rifampin in past - would not attempt to add unless Staph grows.   s/p right knee arthroscopic I and D, 7/17/2020  s/p right knee explant, 7/22/2020

## 2022-01-16 NOTE — PROGRESS NOTE ADULT - SUBJECTIVE AND OBJECTIVE BOX
INFECTIOUS DISEASES CONSULT FOLLOW-UP NOTE    INTERVAL HPI/OVERNIGHT EVENTS:  no event overnight  he is back to the floor  reports chronic L knee pain which is unchanged       ROS:   Constitutional, eyes, ENT, cardiovascular, respiratory, gastrointestinal, genitourinary, integumentary, neurological, psychiatric and heme/lymph are otherwise negative other than noted above       ANTIBIOTICS/RELEVANT:    MEDICATIONS  (STANDING):  atorvastatin 80 milliGRAM(s) Oral at bedtime  chlorhexidine 2% Cloths 1 Application(s) Topical <User Schedule>  DAPTOmycin IVPB 600 milliGRAM(s) IV Intermittent every 24 hours  dextrose 40% Gel 15 Gram(s) Oral once  dextrose 5%. 1000 milliLiter(s) (50 mL/Hr) IV Continuous <Continuous>  dextrose 5%. 1000 milliLiter(s) (100 mL/Hr) IV Continuous <Continuous>  dextrose 50% Injectable 25 Gram(s) IV Push once  dextrose 50% Injectable 12.5 Gram(s) IV Push once  dextrose 50% Injectable 25 Gram(s) IV Push once  DULoxetine 20 milliGRAM(s) Oral daily  gabapentin 100 milliGRAM(s) Oral three times a day  glucagon  Injectable 1 milliGRAM(s) IntraMuscular once  heparin   Injectable 5000 Unit(s) SubCutaneous every 8 hours  insulin glargine Injectable (LANTUS) 35 Unit(s) SubCutaneous at bedtime  insulin lispro (ADMELOG) corrective regimen sliding scale   SubCutaneous Before meals and at bedtime  insulin lispro Injectable (ADMELOG) 9 Unit(s) SubCutaneous three times a day before meals  levothyroxine 25 MICROGram(s) Oral daily  pantoprazole    Tablet 40 milliGRAM(s) Oral before breakfast  senna 2 Tablet(s) Oral at bedtime    MEDICATIONS  (PRN):  sodium chloride 0.9% lock flush 10 milliLiter(s) IV Push every 1 hour PRN Pre/post blood products, medications, blood draw, and to maintain line patency        Vital Signs Last 24 Hrs  T(C): 36.8 (16 Jan 2022 10:16), Max: 37.2 (15 Kris 2022 14:55)  T(F): 98.2 (16 Jan 2022 10:16), Max: 98.9 (15 Kris 2022 14:55)  HR: 80 (16 Jan 2022 09:47) (72 - 80)  BP: 98/56 (16 Jan 2022 09:47) (90/55 - 112/64)  BP(mean): 86 (15 Kris 2022 13:00) (86 - 86)  RR: 18 (16 Jan 2022 09:47) (15 - 18)  SpO2: 97% (16 Jan 2022 09:47) (96% - 100%)    01-15-22 @ 07:01  -  01-16-22 @ 07:00  --------------------------------------------------------  IN: 1010 mL / OUT: 1815 mL / NET: -805 mL    01-16-22 @ 07:01  -  01-16-22 @ 12:56  --------------------------------------------------------  IN: 0 mL / OUT: 150 mL / NET: -150 mL      PHYSICAL EXAM:  Constitutional: alert, NAD  Eyes: the sclera and conjunctiva were normal.   ENT: the ears and nose were normal in appearance.   Neck: the appearance of the neck was normal and the neck was supple.   Pulmonary: no respiratory distress and lungs were clear to auscultation bilaterally.   Heart: heart rate was normal and rhythm regular, normal S1 and S2  Ext: R AKA  Abdomen: normal bowel sounds, soft, non-tender  Neurological: no focal deficits.   Psychiatric: the affect was normal        LABS:                        9.5    14.59 )-----------( 461      ( 16 Jan 2022 06:57 )             30.5     01-16    139  |  102  |  16  ----------------------------<  218<H>  4.0   |  28  |  1.27    Ca    8.5      16 Jan 2022 06:57  Phos  3.4     01-16  Mg     1.9     01-16            MICROBIOLOGY:  1/7 BCx ngtd  1/6 BCx ngtd  1/5 BCx MRSA  1/5 BCx MRSA  1/5 Synovial fluid: MRSA      RADIOLOGY & ADDITIONAL STUDIES:  JOHN PAUL 1/12  CONCLUSIONS:     1. Severely reduced left ventricular systolic function.   2. Normal right ventricular size.   3. Reduced right ventricular systolic function.   4. Dilated left atrium.   5. No LA/RA/VERONICA/RAA thrombus seen.   6. No evidence of an intracardiac shunt.   7. Mild mitral regurgitation.   8. Mild tricuspid regurgitation.   9. Noechocardiographic evidence of vegetations on the valves or device   lead .  10. No evidence of pulmonary hypertension.  11. No pericardial effusion.  12. Compared to the previous JOHN PAUL performed on 7/21/2020, there have been   no significant interval changes.      Gallium scan  Impression: No evidence of ICD infection is noted. There is activity on   the medial aspect of the right leg just above the knee which may be   related to patient's recent surgery.    Please note that gallium is insensitive to possible infection at the tip   of the ICD wires. Transesophageal echocardiography is pending.

## 2022-01-16 NOTE — PROGRESS NOTE ADULT - ASSESSMENT
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   Initially underwent antibiotic cement spacer exchange, I&D and revision gastroc flap by PRS on 1/6.  Bacteremia cleared since 1/6.  Patient already had multiple recurrence and he had flank pus in his R knee.  Now s/p AKA on 1/10 - source control achieved.   Gallium scan negative for ICD infection and JOHN PAUL neg for vegetation on valve and leads.  Had hypotensive episode in OR 1/14 and AKA closure postponed, likely due to volume depletion per SICU team.  Plan for AKA closure tomorrow or Tuesday.  Since bacteremia cleared since 1/6 and source control achieved on 1/10, I think his MRSA bacteremia be treated with 4 weeks of IV dapto from the date of AKA.    - cont daptomycin 600mg IV q24h  - Duration of antibiotics is 4 weeks (1/10 - 2/7)  - Weekly labs: CMP, CBC, ESR, CRP, CK faxed to ID office at [phone]  - Patient to follow up with Dr. Casillas in 2 weeks ([address] [place] [address], [phone]), ID office will call patient to schedule       Thank you for your consult.  Please re-consult us or call us with questions.  Case d/w primary team.    Marta Ruffin MD, MS  Infectious Disease attending  work cell [phone]  For any questions during evening/weekend/holiday, please page ID on call

## 2022-01-16 NOTE — PROGRESS NOTE ADULT - SUBJECTIVE AND OBJECTIVE BOX
Interval Events: Reviewed  Patient seen and examined at bedside.    Patient is a 66y old  Male who presents with a chief complaint of R Knee Pain (15 Kris 2022 19:03)  no acute event overnight, RA 98%, pain controlled       PAST MEDICAL & SURGICAL HISTORY:  Status post percutaneous transluminal coronary angioplasty  2 stents    Atherosclerosis of coronary artery  CAD (coronary artery disease)    Osteoarthritis    HLD (hyperlipidemia)    Blood clot due to device, implant, or graft  was on blood thinners    Diabetes  on insulin pump    HTN (hypertension)    CHF (congestive heart failure)  EF ~ 25%    History of celiac disease    Diverticulitis    STEMI (ST elevation myocardial infarction)    Diabetic neuropathy    Anxiety and depression    Preoperative clearance    Other postprocedural status  Fixation hardware in foot LEFT    Stented coronary artery  10/18 heart attack  INFERIOR WALL MI    S/P CABG x 1  2018    S/P TKR (total knee replacement), right  with infection Mrsa   per pt he was cleared from MRSA infection    Surgery, elective  right knee wound debridement    History of open reduction and internal fixation (ORIF) procedure    H/O shoulder surgery  right    AICD (automatic cardioverter/defibrillator) present    Cholecystostomy care  drain inserted 2020 &amp; removed 4 months ago    History of tonsillectomy    History of hip replacement, total, right        MEDICATIONS:  Pulmonary:    Antimicrobials:  DAPTOmycin IVPB 600 milliGRAM(s) IV Intermittent every 24 hours    Anticoagulants:  heparin   Injectable 5000 Unit(s) SubCutaneous every 8 hours    Cardiac:      Allergies    ACE inhibitors (Hives)  carvedilol (Other)  enalapril (Hives)  Entresto (Other)    Intolerances        Vital Signs Last 24 Hrs  T(C): 36.6 (16 Jan 2022 05:18), Max: 37.2 (15 Kris 2022 14:55)  T(F): 97.9 (16 Jan 2022 05:18), Max: 98.9 (15 Kris 2022 14:55)  HR: 78 (15 Kris 2022 22:54) (65 - 85)  BP: 110/61 (15 Kris 2022 22:54) (82/51 - 111/58)  BP(mean): 86 (15 Kris 2022 13:00) (60 - 86)  RR: 17 (15 Kris 2022 22:54) (14 - 18)  SpO2: 98% (15 Kris 2022 22:54) (96% - 100%)    01-14 @ 07:01  -  01-15 @ 07:00  --------------------------------------------------------  IN: 665.2 mL / OUT: 1760 mL / NET: -1094.8 mL    01-15 @ 07:01  - 01-16 @ 06:31  --------------------------------------------------------  IN: 830 mL / OUT: 1515 mL / NET: -685 mL          Review of Systems:   •	General: negative  •	Skin/Breast: negative  •	Ophthalmologic: negative  •	ENMT: negative  •	Respiratory and Thorax: negative  •	Cardiovascular: negative  •	Gastrointestinal: negative  •	Genitourinary: negative  •	Musculoskeletal: negative  •	Neurological: negative  •	Psychiatric: negative  •	Hematology/Lymphatics: negative  •	Endocrine: negative  •	Allergic/Immunologic: negative    Physical Exam:   • Constitutional:	thin male, NAD  • Eyes:	EOMI; PERRL; no drainage or redness  • ENMT:	No oral lesions; no gross abnormalities  • Neck	no thyromegaly or nodules  • Breasts:	not examined  • Back:	No deformity or limitation of movement  • Respiratory:	Breath Sounds equal & clear to auscultation, no accessory muscle use  • Cardiovascular:	Regular rate & rhythm, normal S1, S2; no murmurs, gallops or rubs; no S3, S4  • Gastrointestinal:	Soft, non-tender, no hepatosplenomegaly, normal bowel sounds  • Genitourinary:	not examined  • Rectal: not examined  • Extremities:	No cyanosis, clubbing or edema, right  AKA, dressing intact   • Vascular:	Equal and normal pulses (dorsalis pedis)  • Neurologica:l	not examined  • Skin:	No lesions; no rash  • Lymph Nodes:	No lymphadedenopathy  • Musculoskeletal:	No joint pain, swelling or deformity; no limitation of movement        LABS:  ABG - ( 14 Jan 2022 10:23 )  pH, Arterial: 7.50  pH, Blood: x     /  pCO2: 36    /  pO2: 245   / HCO3: 28    / Base Excess: 4.9   /  SaO2: 99.3                CBC Full  -  ( 15 Kris 2022 05:35 )  WBC Count : 15.66 K/uL  RBC Count : 4.15 M/uL  Hemoglobin : 10.1 g/dL  Hematocrit : 32.9 %  Platelet Count - Automated : 388 K/uL  Mean Cell Volume : 79.3 fl  Mean Cell Hemoglobin : 24.3 pg  Mean Cell Hemoglobin Concentration : 30.7 gm/dL  Auto Neutrophil # : x  Auto Lymphocyte # : x  Auto Monocyte # : x  Auto Eosinophil # : x  Auto Basophil # : x  Auto Neutrophil % : x  Auto Lymphocyte % : x  Auto Monocyte % : x  Auto Eosinophil % : x  Auto Basophil % : x    01-15    131<L>  |  95<L>  |  18  ----------------------------<  345<H>  4.6   |  25  |  1.59<H>    Ca    8.5      15 Kris 2022 05:35  Phos  3.2     01-15  Mg     2.2     01-15    TPro  6.5  /  Alb  2.8<L>  /  TBili  1.2  /  DBili  x   /  AST  31  /  ALT  25  /  AlkPhos  104  01-14    PT/INR - ( 14 Jan 2022 10:22 )   PT: 14.9 sec;   INR: 1.26          PTT - ( 14 Jan 2022 10:22 )  PTT:25.7 sec                    RADIOLOGY & ADDITIONAL STUDIES (The following images were personally reviewed):  Loja:                                     No  Urine output:                       adequate  DVT prophylaxis:                 Yes  Flattus:                                  Yes  Bowel movement:              No

## 2022-01-16 NOTE — PROGRESS NOTE ADULT - SUBJECTIVE AND OBJECTIVE BOX
24 hr events: : given 1 g IV tylenol, melatonin for sleep, ANAHI, VSS    SUBJECTIVE: Patient seen at bedside complaining of some AKA stump pain, no acute distress. No CP, SOB, fevers or chills.     Vital Signs Last 24 Hrs  T(C): 36.6 (16 Jan 2022 05:18), Max: 37.2 (15 Kris 2022 14:55)  T(F): 97.9 (16 Jan 2022 05:18), Max: 98.9 (15 Kris 2022 14:55)  HR: 80 (16 Jan 2022 06:58) (70 - 85)  BP: 112/64 (16 Jan 2022 06:58) (90/55 - 112/64)  BP(mean): 86 (15 Kris 2022 13:00) (75 - 86)  RR: 18 (16 Jan 2022 06:58) (14 - 18)  SpO2: 100% (16 Jan 2022 06:58) (96% - 100%)    Physical Exam:  General: NAD  Pulmonary: Nonlabored breathing, no respiratory distress  Cardiovascular: NSR  Abdominal: soft, NT/ND, no organomegaly  Extremities: WWP, SCDs in place    Lines/drains/tubes:    I&O's Summary    15 Kris 2022 07:01  -  16 Jan 2022 07:00  --------------------------------------------------------  IN: 1010 mL / OUT: 1815 mL / NET: -805 mL        LABS:                        9.5    14.59 )-----------( 461      ( 16 Jan 2022 06:57 )             30.5     01-16    139  |  102  |  16  ----------------------------<  218<H>  4.0   |  28  |  1.27    Ca    8.5      16 Jan 2022 06:57  Phos  3.4     01-16  Mg     1.9     01-16    TPro  6.5  /  Alb  2.8<L>  /  TBili  1.2  /  DBili  x   /  AST  31  /  ALT  25  /  AlkPhos  104  01-14    PT/INR - ( 14 Jan 2022 10:22 )   PT: 14.9 sec;   INR: 1.26          PTT - ( 14 Jan 2022 10:22 )  PTT:25.7 sec    CAPILLARY BLOOD GLUCOSE      POCT Blood Glucose.: 208 mg/dL (16 Jan 2022 06:53)  POCT Blood Glucose.: 180 mg/dL (15 Kris 2022 21:38)  POCT Blood Glucose.: 257 mg/dL (15 Kris 2022 17:01)  POCT Blood Glucose.: 165 mg/dL (15 Kris 2022 10:27)    LIVER FUNCTIONS - ( 14 Jan 2022 10:22 )  Alb: 2.8 g/dL / Pro: 6.5 g/dL / ALK PHOS: 104 U/L / ALT: 25 U/L / AST: 31 U/L / GGT: x             RADIOLOGY & ADDITIONAL STUDIES:      ---------------------------------------------------------------------------  PLEASE CHECK WHEN PRESENT:  [ x ] Heart Failure  [  ] Acute  [  ] Acute on Chronic  [x  ] Chronic  -------------------------------------------------------------------  [  ]Diastolic [HFpEF]  [x  ]Systolic [HFrEF]  [  ]Combined [HFpEF & HFrEF]  [  ] afib  [x  ] hypertensive heart disease  [  ]Other:  -------------------------------------------------------------------  [ ] Respiratory failure  [ ] Acute cor pulmonale  [ ] Asthma/COPD Exacerbation  [ ] Pleural effusion  [ ] Aspiration pneumonia  -------------------------------------------------------------------  [  ]MOISES  [  ]ATN  [  ]Reneal Medullary Necrosis  [  ]Renal Cortical Necrosis  [  ]Other Pathological Lesions:    [  ]CKD 1  [  ]CKD 2  [  ]CKD 3  [  ]CKD 4  [  ]CKD 5  [  ]Other  -------------------------------------------------------------------  [ x ]Diabetes  [  ] Diabetic PVD Ulcer  [  ] Neuropathic ulcer to DM  [  ] Diabetes with Nephropathy  [  ] Osteomyelitis due to diabetes  --------------------------------------------------------------------  [  ]Malnutrition: See Nutrition Note  [  ]Cachexia  [  ]Other:   [  ]Supplement Ordered:  [  ]Morbid Obesity (BMI >=40]  ---------------------------------------------------------------------  [ ] Sepsis/severe sepsis/septic shock  [ ] UTI  [ ] Pneumonia  -----------------------------------------------------------------------  [ ] Acidosis/alkalosis  [ ] Fluid overload  [ ] Hypokalemia  [ ] Hyperkalemia  [ ] Hypomagnesemia  [ ] Hypophosphatemia  [ ] Hyperphosphatemia  ------------------------------------------------------------------------  [ ] Acute blood loss anemia  [ ] Post op blood loss anemia  [ ] Iron deficiency anemia  [ ] Anemia due to chronic disease  [ ] Hypercoagulable state  ----------------------------------------------------------------------  [ ] Cerebral infarction  [ ] Transient ischemia attack  [ ] Encephalopathy        66yMale Hx CAD, CAD s/p MIDCAB/VERONICA 2018, AICD (2/2020), CHF (EF 15-20%), DM, 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 Castro (explant and abx spacer exchange), transferred here on 1/6 from [facility] for MRSA bacteremia due to recurrent PJI. He was taken to OR with ortho on Revision gastroc flap by PRS, antibiotic cement spacer removal, I&D, replacement on 1/6, then taken to OR with vascular on 1/10 for right AKA. taken to OR for completion AKA today, but hypotensive on induction, required pressors, procedure aborted, kept intubated and sent to SICU. Extubated in SICU on 1/14 and SDU today. Dc'ed Dilaudid PRN due to low BP. Donnie in place to monitor I/Os    Neuro: Tylenol for pain  CV: echo 1/6 EF 15-20%, PASP 37, AICD, OFF levophed. stable.   Pulm: room air  GI/FEN: diabetic diet, protonix. Senna.   : donnie, slight MOISES Cr uptrending  ID: Daptomycin (1/13-) for MRSA septic joint   Heme: HSQ  Endo: ISS, lispro 9, lantus 35, synthroid  Wounds/drains: left AKA guillotine (dressing by vascular daily),   LINES: R PICC (1/11-), L midline (1/14--)  right radial a-line (1/14--), PIVs  Dispo: SDU

## 2022-01-16 NOTE — PROGRESS NOTE ADULT - PROBLEM SELECTOR PROBLEM 7
Atherosclerosis of native coronary artery without angina pectoris Breath sounds clear and equal bilaterally.

## 2022-01-17 ENCOUNTER — TRANSCRIPTION ENCOUNTER (OUTPATIENT)
Age: 67
End: 2022-01-17

## 2022-01-17 LAB
ANION GAP SERPL CALC-SCNC: 11 MMOL/L — SIGNIFICANT CHANGE UP (ref 5–17)
BUN SERPL-MCNC: 18 MG/DL — SIGNIFICANT CHANGE UP (ref 7–23)
CALCIUM SERPL-MCNC: 8.6 MG/DL — SIGNIFICANT CHANGE UP (ref 8.4–10.5)
CHLORIDE SERPL-SCNC: 98 MMOL/L — SIGNIFICANT CHANGE UP (ref 96–108)
CO2 SERPL-SCNC: 26 MMOL/L — SIGNIFICANT CHANGE UP (ref 22–31)
CREAT SERPL-MCNC: 1.3 MG/DL — SIGNIFICANT CHANGE UP (ref 0.5–1.3)
GLUCOSE BLDC GLUCOMTR-MCNC: 125 MG/DL — HIGH (ref 70–99)
GLUCOSE BLDC GLUCOMTR-MCNC: 193 MG/DL — HIGH (ref 70–99)
GLUCOSE BLDC GLUCOMTR-MCNC: 270 MG/DL — HIGH (ref 70–99)
GLUCOSE BLDC GLUCOMTR-MCNC: 97 MG/DL — SIGNIFICANT CHANGE UP (ref 70–99)
GLUCOSE SERPL-MCNC: 205 MG/DL — HIGH (ref 70–99)
HCT VFR BLD CALC: 30.8 % — LOW (ref 39–50)
HGB BLD-MCNC: 9.2 G/DL — LOW (ref 13–17)
MAGNESIUM SERPL-MCNC: 1.8 MG/DL — SIGNIFICANT CHANGE UP (ref 1.6–2.6)
MCHC RBC-ENTMCNC: 24.3 PG — LOW (ref 27–34)
MCHC RBC-ENTMCNC: 29.9 GM/DL — LOW (ref 32–36)
MCV RBC AUTO: 81.5 FL — SIGNIFICANT CHANGE UP (ref 80–100)
NRBC # BLD: 0 /100 WBCS — SIGNIFICANT CHANGE UP (ref 0–0)
PHOSPHATE SERPL-MCNC: 3.6 MG/DL — SIGNIFICANT CHANGE UP (ref 2.5–4.5)
PLATELET # BLD AUTO: 449 K/UL — HIGH (ref 150–400)
POTASSIUM SERPL-MCNC: 4.1 MMOL/L — SIGNIFICANT CHANGE UP (ref 3.5–5.3)
POTASSIUM SERPL-SCNC: 4.1 MMOL/L — SIGNIFICANT CHANGE UP (ref 3.5–5.3)
RBC # BLD: 3.78 M/UL — LOW (ref 4.2–5.8)
RBC # FLD: 18.3 % — HIGH (ref 10.3–14.5)
SARS-COV-2 RNA SPEC QL NAA+PROBE: SIGNIFICANT CHANGE UP
SODIUM SERPL-SCNC: 135 MMOL/L — SIGNIFICANT CHANGE UP (ref 135–145)
SURGICAL PATHOLOGY STUDY: SIGNIFICANT CHANGE UP
WBC # BLD: 12.63 K/UL — HIGH (ref 3.8–10.5)
WBC # FLD AUTO: 12.63 K/UL — HIGH (ref 3.8–10.5)

## 2022-01-17 PROCEDURE — 99232 SBSQ HOSP IP/OBS MODERATE 35: CPT | Mod: GC

## 2022-01-17 RX ORDER — HYDROMORPHONE HYDROCHLORIDE 2 MG/ML
0.5 INJECTION INTRAMUSCULAR; INTRAVENOUS; SUBCUTANEOUS ONCE
Refills: 0 | Status: DISCONTINUED | OUTPATIENT
Start: 2022-01-17 | End: 2022-01-17

## 2022-01-17 RX ORDER — ASPIRIN/CALCIUM CARB/MAGNESIUM 324 MG
81 TABLET ORAL DAILY
Refills: 0 | Status: DISCONTINUED | OUTPATIENT
Start: 2022-01-17 | End: 2022-01-27

## 2022-01-17 RX ORDER — INSULIN GLARGINE 100 [IU]/ML
35 INJECTION, SOLUTION SUBCUTANEOUS AT BEDTIME
Refills: 0 | Status: DISCONTINUED | OUTPATIENT
Start: 2022-01-17 | End: 2022-01-18

## 2022-01-17 RX ORDER — SIMETHICONE 80 MG/1
80 TABLET, CHEWABLE ORAL ONCE
Refills: 0 | Status: COMPLETED | OUTPATIENT
Start: 2022-01-17 | End: 2022-01-20

## 2022-01-17 RX ORDER — MAGNESIUM OXIDE 400 MG ORAL TABLET 241.3 MG
400 TABLET ORAL ONCE
Refills: 0 | Status: COMPLETED | OUTPATIENT
Start: 2022-01-17 | End: 2022-01-17

## 2022-01-17 RX ORDER — SIMETHICONE 80 MG/1
80 TABLET, CHEWABLE ORAL DAILY
Refills: 0 | Status: DISCONTINUED | OUTPATIENT
Start: 2022-01-17 | End: 2022-01-17

## 2022-01-17 RX ADMIN — OXYCODONE AND ACETAMINOPHEN 1 TABLET(S): 5; 325 TABLET ORAL at 22:58

## 2022-01-17 RX ADMIN — ATORVASTATIN CALCIUM 80 MILLIGRAM(S): 80 TABLET, FILM COATED ORAL at 21:59

## 2022-01-17 RX ADMIN — OXYCODONE AND ACETAMINOPHEN 1 TABLET(S): 5; 325 TABLET ORAL at 00:41

## 2022-01-17 RX ADMIN — Medication 9 UNIT(S): at 17:06

## 2022-01-17 RX ADMIN — OXYCODONE AND ACETAMINOPHEN 1 TABLET(S): 5; 325 TABLET ORAL at 21:58

## 2022-01-17 RX ADMIN — HYDROMORPHONE HYDROCHLORIDE 0.5 MILLIGRAM(S): 2 INJECTION INTRAMUSCULAR; INTRAVENOUS; SUBCUTANEOUS at 02:02

## 2022-01-17 RX ADMIN — OXYCODONE AND ACETAMINOPHEN 1 TABLET(S): 5; 325 TABLET ORAL at 12:00

## 2022-01-17 RX ADMIN — GABAPENTIN 200 MILLIGRAM(S): 400 CAPSULE ORAL at 21:59

## 2022-01-17 RX ADMIN — HEPARIN SODIUM 5000 UNIT(S): 5000 INJECTION INTRAVENOUS; SUBCUTANEOUS at 05:51

## 2022-01-17 RX ADMIN — Medication 2: at 07:39

## 2022-01-17 RX ADMIN — HYDROMORPHONE HYDROCHLORIDE 0.5 MILLIGRAM(S): 2 INJECTION INTRAMUSCULAR; INTRAVENOUS; SUBCUTANEOUS at 08:23

## 2022-01-17 RX ADMIN — HYDROMORPHONE HYDROCHLORIDE 0.5 MILLIGRAM(S): 2 INJECTION INTRAMUSCULAR; INTRAVENOUS; SUBCUTANEOUS at 02:32

## 2022-01-17 RX ADMIN — SIMETHICONE 80 MILLIGRAM(S): 80 TABLET, CHEWABLE ORAL at 11:55

## 2022-01-17 RX ADMIN — MAGNESIUM OXIDE 400 MG ORAL TABLET 400 MILLIGRAM(S): 241.3 TABLET ORAL at 07:40

## 2022-01-17 RX ADMIN — DULOXETINE HYDROCHLORIDE 20 MILLIGRAM(S): 30 CAPSULE, DELAYED RELEASE ORAL at 21:59

## 2022-01-17 RX ADMIN — DAPTOMYCIN 124 MILLIGRAM(S): 500 INJECTION, POWDER, LYOPHILIZED, FOR SOLUTION INTRAVENOUS at 17:25

## 2022-01-17 RX ADMIN — PANTOPRAZOLE SODIUM 40 MILLIGRAM(S): 20 TABLET, DELAYED RELEASE ORAL at 05:52

## 2022-01-17 RX ADMIN — DULOXETINE HYDROCHLORIDE 20 MILLIGRAM(S): 30 CAPSULE, DELAYED RELEASE ORAL at 11:50

## 2022-01-17 RX ADMIN — Medication 9 UNIT(S): at 07:39

## 2022-01-17 RX ADMIN — CHLORHEXIDINE GLUCONATE 1 APPLICATION(S): 213 SOLUTION TOPICAL at 06:24

## 2022-01-17 RX ADMIN — Medication 6: at 21:58

## 2022-01-17 RX ADMIN — HEPARIN SODIUM 5000 UNIT(S): 5000 INJECTION INTRAVENOUS; SUBCUTANEOUS at 21:58

## 2022-01-17 RX ADMIN — GABAPENTIN 200 MILLIGRAM(S): 400 CAPSULE ORAL at 05:51

## 2022-01-17 RX ADMIN — INSULIN GLARGINE 35 UNIT(S): 100 INJECTION, SOLUTION SUBCUTANEOUS at 22:00

## 2022-01-17 RX ADMIN — OXYCODONE AND ACETAMINOPHEN 1 TABLET(S): 5; 325 TABLET ORAL at 01:41

## 2022-01-17 RX ADMIN — Medication 25 MICROGRAM(S): at 05:51

## 2022-01-17 NOTE — PROGRESS NOTE ADULT - SUBJECTIVE AND OBJECTIVE BOX
INTERVAL HPI/OVERNIGHT EVENTS:    Patient is a 66y old  Male who presents with a chief complaint of R Knee Pain (2022 08:50)  Patient slept for most of day and did not have much to eat. He reports having his fruit cup around lunch time as advised by the nurse.   FSG & Insulin received:    Yesterday:  pre-dinner fs  nutritional lispro 9  units + 8  units lispro SS  bedtime fs  lantus 40  units +  2  units lispro SS    Today:  pre-breakfast fs  nutritional lispro 9  units+ 2   units lispro SS  pre-lunch fs  nutritional lispro  0 units+0   units lispro SS    Pt reports the following symptoms:    CONSTITUTIONAL:  Negative fever or chills, feels well, good appetite  EYES:  Negative  blurry vision or double vision  CARDIOVASCULAR:  Negative for chest pain or palpitations  RESPIRATORY:  Negative for cough, wheezing, or SOB   GASTROINTESTINAL:  Negative for nausea, vomiting, diarrhea, constipation, or abdominal pain  GENITOURINARY:  Negative frequency, urgency or dysuria  NEUROLOGIC:  No headache, confusion, dizziness, lightheadedness    MEDICATIONS  (STANDING):  aspirin enteric coated 81 milliGRAM(s) Oral daily  atorvastatin 80 milliGRAM(s) Oral at bedtime  chlorhexidine 2% Cloths 1 Application(s) Topical <User Schedule>  DAPTOmycin IVPB 600 milliGRAM(s) IV Intermittent every 24 hours  dextrose 40% Gel 15 Gram(s) Oral once  dextrose 5%. 1000 milliLiter(s) (50 mL/Hr) IV Continuous <Continuous>  dextrose 5%. 1000 milliLiter(s) (100 mL/Hr) IV Continuous <Continuous>  dextrose 50% Injectable 25 Gram(s) IV Push once  dextrose 50% Injectable 12.5 Gram(s) IV Push once  dextrose 50% Injectable 25 Gram(s) IV Push once  DULoxetine 20 milliGRAM(s) Oral two times a day  gabapentin 200 milliGRAM(s) Oral three times a day  glucagon  Injectable 1 milliGRAM(s) IntraMuscular once  heparin   Injectable 5000 Unit(s) SubCutaneous every 8 hours  insulin glargine Injectable (LANTUS) 35 Unit(s) SubCutaneous at bedtime  insulin lispro (ADMELOG) corrective regimen sliding scale   SubCutaneous Before meals and at bedtime  insulin lispro Injectable (ADMELOG) 9 Unit(s) SubCutaneous three times a day before meals  levothyroxine 25 MICROGram(s) Oral daily  pantoprazole    Tablet 40 milliGRAM(s) Oral before breakfast  senna 2 Tablet(s) Oral at bedtime    MEDICATIONS  (PRN):  acetaminophen     Tablet .. 650 milliGRAM(s) Oral every 6 hours PRN Moderate Pain (4 - 6)  oxycodone    5 mG/acetaminophen 325 mG 1 Tablet(s) Oral every 6 hours PRN Severe Pain (7 - 10)  simethicone 80 milliGRAM(s) Chew once PRN Gas  sodium chloride 0.9% lock flush 10 milliLiter(s) IV Push every 1 hour PRN Pre/post blood products, medications, blood draw, and to maintain line patency      PHYSICAL EXAM  Vital Signs Last 24 Hrs  T(C): 36.7 (2022 14:32), Max: 36.9 (2022 18:49)  T(F): 98.1 (2022 14:32), Max: 98.4 (2022 18:49)  HR: 91 (2022 17:31) (77 - 97)  BP: 108/63 (2022 17:31) (105/53 - 131/65)  BP(mean): 88 (2022 05:48) (68 - 92)  RR: 17 (2022 17:31) (17 - 19)  SpO2: 97% (2022 17:31) (96% - 98%)    Constitutional: NAD.   HEENT: NCAT, MMM, OP clear, EOMI, , no proptosis or lid retraction  Neck: no thyromegaly or palpable thyroid nodules   Respiratory: lungs CTAB.  Cardiovascular: regular rhythm, normal S1 and S2, no audible murmurs, no peripheral edema  GI: soft, NT/ND, no masses/HSM appreciated.  Neurology: no tremors  Skin: no visible rashes/lesions, R AKA, dark rash on left shin, cool to touch   Psychiatric: AAO x 3, tired    LABS:                        9.2    12.63 )-----------( 449      ( 2022 06:11 )             30.8         135  |  98  |  18  ----------------------------<  205<H>  4.1   |  26  |  1.30    Ca    8.6      2022 06:11  Phos  3.6     -  Mg     1.8     -          Thyroid Stimulating Hormone, Serum: 2.850 uIU/mL ( @ 08:25)  Thyroid Stimulating Hormone, Serum: 1.68 uIU/mL ( @ 17:00)      HbA1C:         Insulin Sliding Scale requirements X 24 Hours:      RADIOLOGY & ADDITIONAL TESTS:      Assessment and Plan:   · Assessment	  66M hx of CAD with CABG , CHF (EF 20-25%) with AICD, T2DM c/b neuropathy with hx of diabetic ulcer, HLD. HTN, COPD, diverticulitis, celiac disease, anxiety and depression, cholecystostomy, R hip replacement R TKA several years ago c/b recurrent PJI and revisions, most recently in 2020 by Dr. Castro (explant and abx spacer exchange), transferred here from [facility] for recurrent PJI for repeat spacer exchange and possible flap coverage with Dr. Vaughn/Dr. Bowen. Pt has been experiencing atraumatic worsening R knee pain x 1 month. Notes he went to a procedure center where his knee was aspirated 3 weeks ago. Pt was seen at [facility] yesterday where his knee was aspirated with 283k cell count. WBC 14, ESR 78, +, AVSS. Pt started on vancomycin + rifampin. 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 Now s/p Replacement, antibiotic spacer 2022. Also with hyperglycemia.  now s/p R AKA on vascular service     A1C: 9.0%  Weight: 97kg BMI 28  Cr/GFR: 0.9/104  EF: 20-25%     Problem/Plan - 1:  ·  Problem: Type 2 diabetes mellitus.   ·  Plan: Please reduce lantus  to 35  units at night. Will be NPO at midnight for closure   Please keep lispro to 9  units before each meal when he eats.    Please continue lispro moderate dose sliding scale four times daily with meals and at bedtime    Pt's fingerstick glucose goal is 100-180.    Will continue to monitor     For discharge, pt can continue...TBD    Pt can follow up at discharge with Dr Schmitt at scheduled appt. .  Will discuss case with Dr. Luevano    and update primary team.

## 2022-01-17 NOTE — PROGRESS NOTE ADULT - SUBJECTIVE AND OBJECTIVE BOX
Pre-op Diagnosis: s/p Right guillotine AKA  Procedure: Right AKA revision/closure   Surgeon: Mellissa    INCOMPLETE    Consent:                          9.2    12.63 )-----------( 449      ( 2022 06:11 )             30.8         135  |  98  |  18  ----------------------------<  205<H>  4.1   |  26  |  1.30    Ca    8.6      2022 06:11  Phos  3.6       Mg     1.8                 Type & Screen: With AM labs         (*With most recent within 72hrs of OR)  CXR:   EK/10  UA:     COVID status/date: [ ]Negative/Date:  [ ]Positive/Date:     *With most recent within 48hrs of OR    Is patient on ACE/ARB? [x ]No [ ]Yes   *If yes, please hold any ACE/ARB the day of surgery    Is patient on Lantus at bedtime?  [ ]No [x ]Yes   *If yes, please half the dose the night before OR since patient will be NPO    Does patient have a contrast allergy? [ x]No [ ]Yes  *If yes, please pre-medicate per protocol    Is patient on anticoagulation? [x ]No [ ] Yes  *If yes, please discuss with team when to hold it    Is the patient Female and <56yo [x ]No [ ] Yes  If yes, pregnancy test must be documented in the chart    Is patient on dialysis? [x ]No [ ]Yes  *If yes, please obtain all labs including K level EARLY the day of surgery   *Also, will NOT require IVF past midnight    A/P: 66yMale pre-op for above procedure  1. NPO past midnight, except medications  2. IVF at midnight: n/a  3. [ ] Blood on hold, Units:

## 2022-01-17 NOTE — PROGRESS NOTE ADULT - SUBJECTIVE AND OBJECTIVE BOX
O/N: switched to PO pain meds   1/16 Loja removed in AM, passed TOV. Per pam increased lantus to 40, also recommended increasing cymbalta to 20 BID and gabapentin to 200 TID tomorrow and then to 300 TID afterwards           ---------------------------------------------------------------------------  PLEASE CHECK WHEN PRESENT:  [ x ] Heart Failure  [  ] Acute  [  ] Acute on Chronic  [x  ] Chronic  -------------------------------------------------------------------  [  ]Diastolic [HFpEF]  [x  ]Systolic [HFrEF]  [  ]Combined [HFpEF & HFrEF]  [  ] afib  [x  ] hypertensive heart disease  [  ]Other:  -------------------------------------------------------------------  [ ] Respiratory failure  [ ] Acute cor pulmonale  [ ] Asthma/COPD Exacerbation  [ ] Pleural effusion  [ ] Aspiration pneumonia  -------------------------------------------------------------------  [  ]MOISES  [  ]ATN  [  ]Reneal Medullary Necrosis  [  ]Renal Cortical Necrosis  [  ]Other Pathological Lesions:    [  ]CKD 1  [  ]CKD 2  [  ]CKD 3  [  ]CKD 4  [  ]CKD 5  [  ]Other  -------------------------------------------------------------------  [ x ]Diabetes  [  ] Diabetic PVD Ulcer  [  ] Neuropathic ulcer to DM  [  ] Diabetes with Nephropathy  [  ] Osteomyelitis due to diabetes  --------------------------------------------------------------------  [  ]Malnutrition: See Nutrition Note  [  ]Cachexia  [  ]Other:   [  ]Supplement Ordered:  [  ]Morbid Obesity (BMI >=40]  ---------------------------------------------------------------------  [ ] Sepsis/severe sepsis/septic shock  [ ] UTI  [ ] Pneumonia  -----------------------------------------------------------------------  [ ] Acidosis/alkalosis  [ ] Fluid overload  [ ] Hypokalemia  [ ] Hyperkalemia  [ ] Hypomagnesemia  [ ] Hypophosphatemia  [ ] Hyperphosphatemia  ------------------------------------------------------------------------  [ ] Acute blood loss anemia  [ ] Post op blood loss anemia  [ ] Iron deficiency anemia  [ ] Anemia due to chronic disease  [ ] Hypercoagulable state  ----------------------------------------------------------------------  [ ] Cerebral infarction  [ ] Transient ischemia attack  [ ] Encephalopathy        A/P: 66yMale Hx CAD, CAD s/p MIDCAB/VERONICA 2018, AICD (2/2020), CHF (EF 15-20%), DM, 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 Castro (explant and abx spacer exchange), transferred here on 1/6 from [facility] for MRSA bacteremia due to recurrent PJI. He was taken to OR with ortho on Revision gastroc flap by PRS, antibiotic cement spacer removal, I&D, replacement on 1/6, then taken to OR with vascular on 1/10 for right AKA. taken to OR for completion AKA today, but hypotensive on induction, required pressors, procedure aborted, kept intubated and sent to SICU. Extubated in SICU on 1/14 and stepped down.     Vascular/PAD:  -Recurrent septic arthritis of R knee now s/p guillotine R AKA 1/10/22  -Pain control  -Plan for OR today for closure tomorrow 1/18    CAD/CHF:   -c/w home spironolactone, lasix  -JHON PAUL normal    ID:  - h/o MRSA   - Daptomycin (1/13-) for MRSA septic joint   - picc placed for long term abx.   - abx duratiion 4 weeks    DM:  -ISS  -Lantus 35, lispro 9 TID    Diet:   Consistent carb    Activity:   -PT/OT consult  - anticipate acute rehab placement when medically ready for discharge     DVTPPx: HSQ    Dispo:  pending closure of right above knee amputation tomorrow 1/18   O/N: switched to PO pain meds   1/16 Loja removed in AM, passed TOV. Per endo increased lantus to 40, also recommended increasing cymbalta to 20 BID and gabapentin to 200 TID tomorrow and then to 300 TID afterwards       SUBJECTIVE: Patient seen at bedside with no acute distress. No CP, SOB, fevers or chills     Vital Signs Last 24 Hrs  T(C): 36.3 (17 Jan 2022 06:34), Max: 37 (16 Jan 2022 14:30)  T(F): 97.4 (17 Jan 2022 06:34), Max: 98.6 (16 Jan 2022 14:30)  HR: 81 (17 Jan 2022 05:48) (80 - 97)  BP: 120/67 (17 Jan 2022 05:48) (98/56 - 131/65)  BP(mean): 88 (17 Jan 2022 05:48) (68 - 92)  RR: 19 (17 Jan 2022 05:48) (17 - 19)  SpO2: 96% (17 Jan 2022 05:48) (96% - 100%)    Physical Exam:  Gen: NAD, resting comfortably in bed  C/V: NSR  Pulm: Nonlabored breathing, no respiratory distress  Abd: soft, NT/ND  Extrem: RLE AKA  wound was clean, dry and intact. healthy tissue, pink and well perfused. minimal bleeding, Xeroform, gauze, kerlix and ace wraps used to redress stump.      Lines/drains/tubes:    I&O's Summary    15 Kris 2022 07:01  -  16 Jan 2022 07:00  --------------------------------------------------------  IN: 1010 mL / OUT: 1815 mL / NET: -805 mL    16 Jan 2022 07:01  -  17 Jan 2022 06:46  --------------------------------------------------------  IN: 763 mL / OUT: 625 mL / NET: 138 mL        LABS:                        9.2    12.63 )-----------( 449      ( 17 Jan 2022 06:11 )             30.8     01-17    135  |  98  |  18  ----------------------------<  x   4.1   |  26  |  1.30    Ca    8.6      17 Jan 2022 06:11  Phos  3.6     01-17  Mg     1.8     01-17          CAPILLARY BLOOD GLUCOSE      POCT Blood Glucose.: 193 mg/dL (17 Jan 2022 06:44)  POCT Blood Glucose.: 161 mg/dL (16 Jan 2022 21:34)  POCT Blood Glucose.: 322 mg/dL (16 Jan 2022 16:48)  POCT Blood Glucose.: 178 mg/dL (16 Jan 2022 11:28)  POCT Blood Glucose.: 208 mg/dL (16 Jan 2022 06:53)        RADIOLOGY & ADDITIONAL STUDIES:        ---------------------------------------------------------------------------  PLEASE CHECK WHEN PRESENT:  [ x ] Heart Failure  [  ] Acute  [  ] Acute on Chronic  [x  ] Chronic  -------------------------------------------------------------------  [  ]Diastolic [HFpEF]  [x  ]Systolic [HFrEF]  [  ]Combined [HFpEF & HFrEF]  [  ] afib  [x  ] hypertensive heart disease  [  ]Other:  -------------------------------------------------------------------  [ ] Respiratory failure  [ ] Acute cor pulmonale  [ ] Asthma/COPD Exacerbation  [ ] Pleural effusion  [ ] Aspiration pneumonia  -------------------------------------------------------------------  [  ]MOISES  [  ]ATN  [  ]Reneal Medullary Necrosis  [  ]Renal Cortical Necrosis  [  ]Other Pathological Lesions:    [  ]CKD 1  [  ]CKD 2  [  ]CKD 3  [  ]CKD 4  [  ]CKD 5  [  ]Other  -------------------------------------------------------------------  [ x ]Diabetes  [  ] Diabetic PVD Ulcer  [  ] Neuropathic ulcer to DM  [  ] Diabetes with Nephropathy  [  ] Osteomyelitis due to diabetes  --------------------------------------------------------------------  [  ]Malnutrition: See Nutrition Note  [  ]Cachexia  [  ]Other:   [  ]Supplement Ordered:  [  ]Morbid Obesity (BMI >=40]  ---------------------------------------------------------------------  [ ] Sepsis/severe sepsis/septic shock  [ ] UTI  [ ] Pneumonia  -----------------------------------------------------------------------  [ ] Acidosis/alkalosis  [ ] Fluid overload  [ ] Hypokalemia  [ ] Hyperkalemia  [ ] Hypomagnesemia  [ ] Hypophosphatemia  [ ] Hyperphosphatemia  ------------------------------------------------------------------------  [ ] Acute blood loss anemia  [ ] Post op blood loss anemia  [ ] Iron deficiency anemia  [ ] Anemia due to chronic disease  [ ] Hypercoagulable state  ----------------------------------------------------------------------  [ ] Cerebral infarction  [ ] Transient ischemia attack  [ ] Encephalopathy        A/P: 66yMale Hx CAD, CAD s/p MIDCAB/VERONICA 2018, AICD (2/2020), CHF (EF 15-20%), DM, 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 Castro (explant and abx spacer exchange), transferred here on 1/6 from [facility] for MRSA bacteremia due to recurrent PJI. He was taken to OR with ortho on Revision gastroc flap by PRS, antibiotic cement spacer removal, I&D, replacement on 1/6, then taken to OR with vascular on 1/10 for right AKA. taken to OR for completion AKA today, but hypotensive on induction, required pressors, procedure aborted, kept intubated and sent to SICU. Extubated in SICU on 1/14 and stepped down.     Vascular/PAD:  -Recurrent septic arthritis of R knee now s/p guillotine R AKA 1/10/22  -Pain control  -Plan for OR today for closure tomorrow 1/18    CAD/CHF:   -c/w home spironolactone, lasix  -JOHN PAUL normal    ID:  - h/o MRSA   - Daptomycin (1/13-) for MRSA septic joint   - picc placed for long term abx.   - abx duratiion 4 weeks    DM:  -ISS  -Lantus 35, lispro 9 TID    Diet:   Consistent carb    Activity:   -PT/OT consult  - anticipate acute rehab placement when medically ready for discharge     DVTPPx: HSQ    Dispo:  pending closure of right above knee amputation tomorrow 1/18

## 2022-01-18 LAB
ANION GAP SERPL CALC-SCNC: 11 MMOL/L — SIGNIFICANT CHANGE UP (ref 5–17)
APTT BLD: 27.1 SEC — LOW (ref 27.5–35.5)
BLD GP AB SCN SERPL QL: NEGATIVE — SIGNIFICANT CHANGE UP
BUN SERPL-MCNC: 18 MG/DL — SIGNIFICANT CHANGE UP (ref 7–23)
CALCIUM SERPL-MCNC: 8.5 MG/DL — SIGNIFICANT CHANGE UP (ref 8.4–10.5)
CHLORIDE SERPL-SCNC: 100 MMOL/L — SIGNIFICANT CHANGE UP (ref 96–108)
CO2 SERPL-SCNC: 25 MMOL/L — SIGNIFICANT CHANGE UP (ref 22–31)
CREAT SERPL-MCNC: 1.2 MG/DL — SIGNIFICANT CHANGE UP (ref 0.5–1.3)
GLUCOSE BLDC GLUCOMTR-MCNC: 105 MG/DL — HIGH (ref 70–99)
GLUCOSE BLDC GLUCOMTR-MCNC: 138 MG/DL — HIGH (ref 70–99)
GLUCOSE BLDC GLUCOMTR-MCNC: 71 MG/DL — SIGNIFICANT CHANGE UP (ref 70–99)
GLUCOSE BLDC GLUCOMTR-MCNC: 80 MG/DL — SIGNIFICANT CHANGE UP (ref 70–99)
GLUCOSE BLDC GLUCOMTR-MCNC: 87 MG/DL — SIGNIFICANT CHANGE UP (ref 70–99)
GLUCOSE SERPL-MCNC: 93 MG/DL — SIGNIFICANT CHANGE UP (ref 70–99)
GRAM STN FLD: SIGNIFICANT CHANGE UP
HCT VFR BLD CALC: 30.5 % — LOW (ref 39–50)
HGB BLD-MCNC: 9.3 G/DL — LOW (ref 13–17)
INR BLD: 1.21 — HIGH (ref 0.88–1.16)
MAGNESIUM SERPL-MCNC: 2 MG/DL — SIGNIFICANT CHANGE UP (ref 1.6–2.6)
MCHC RBC-ENTMCNC: 25.1 PG — LOW (ref 27–34)
MCHC RBC-ENTMCNC: 30.5 GM/DL — LOW (ref 32–36)
MCV RBC AUTO: 82.4 FL — SIGNIFICANT CHANGE UP (ref 80–100)
NRBC # BLD: 0 /100 WBCS — SIGNIFICANT CHANGE UP (ref 0–0)
PHOSPHATE SERPL-MCNC: 3.3 MG/DL — SIGNIFICANT CHANGE UP (ref 2.5–4.5)
PLATELET # BLD AUTO: 466 K/UL — HIGH (ref 150–400)
POTASSIUM SERPL-MCNC: 4.4 MMOL/L — SIGNIFICANT CHANGE UP (ref 3.5–5.3)
POTASSIUM SERPL-SCNC: 4.4 MMOL/L — SIGNIFICANT CHANGE UP (ref 3.5–5.3)
PROTHROM AB SERPL-ACNC: 14.4 SEC — HIGH (ref 10.6–13.6)
RBC # BLD: 3.7 M/UL — LOW (ref 4.2–5.8)
RBC # FLD: 18.4 % — HIGH (ref 10.3–14.5)
RH IG SCN BLD-IMP: POSITIVE — SIGNIFICANT CHANGE UP
SODIUM SERPL-SCNC: 136 MMOL/L — SIGNIFICANT CHANGE UP (ref 135–145)
SPECIMEN SOURCE: SIGNIFICANT CHANGE UP
WBC # BLD: 11.69 K/UL — HIGH (ref 3.8–10.5)
WBC # FLD AUTO: 11.69 K/UL — HIGH (ref 3.8–10.5)

## 2022-01-18 PROCEDURE — 27594 AMPUTATION FOLLOW-UP SURGERY: CPT | Mod: 78,GC

## 2022-01-18 PROCEDURE — 99231 SBSQ HOSP IP/OBS SF/LOW 25: CPT | Mod: GC

## 2022-01-18 RX ORDER — OXYCODONE AND ACETAMINOPHEN 5; 325 MG/1; MG/1
1 TABLET ORAL EVERY 6 HOURS
Refills: 0 | Status: DISCONTINUED | OUTPATIENT
Start: 2022-01-18 | End: 2022-01-19

## 2022-01-18 RX ORDER — DEXTROSE 50 % IN WATER 50 %
25 SYRINGE (ML) INTRAVENOUS ONCE
Refills: 0 | Status: DISCONTINUED | OUTPATIENT
Start: 2022-01-18 | End: 2022-01-18

## 2022-01-18 RX ORDER — DEXTROSE 50 % IN WATER 50 %
25 SYRINGE (ML) INTRAVENOUS ONCE
Refills: 0 | Status: COMPLETED | OUTPATIENT
Start: 2022-01-18 | End: 2022-01-18

## 2022-01-18 RX ORDER — HYDROMORPHONE HYDROCHLORIDE 2 MG/ML
1 INJECTION INTRAMUSCULAR; INTRAVENOUS; SUBCUTANEOUS ONCE
Refills: 0 | Status: DISCONTINUED | OUTPATIENT
Start: 2022-01-18 | End: 2022-01-18

## 2022-01-18 RX ORDER — INSULIN LISPRO 100/ML
12 VIAL (ML) SUBCUTANEOUS
Refills: 0 | Status: DISCONTINUED | OUTPATIENT
Start: 2022-01-18 | End: 2022-01-19

## 2022-01-18 RX ORDER — SODIUM CHLORIDE 9 MG/ML
1000 INJECTION, SOLUTION INTRAVENOUS
Refills: 0 | Status: DISCONTINUED | OUTPATIENT
Start: 2022-01-18 | End: 2022-01-18

## 2022-01-18 RX ORDER — OXYCODONE AND ACETAMINOPHEN 5; 325 MG/1; MG/1
2 TABLET ORAL EVERY 4 HOURS
Refills: 0 | Status: DISCONTINUED | OUTPATIENT
Start: 2022-01-18 | End: 2022-01-19

## 2022-01-18 RX ORDER — OXYCODONE AND ACETAMINOPHEN 5; 325 MG/1; MG/1
2 TABLET ORAL EVERY 6 HOURS
Refills: 0 | Status: DISCONTINUED | OUTPATIENT
Start: 2022-01-18 | End: 2022-01-18

## 2022-01-18 RX ORDER — HYDROMORPHONE HYDROCHLORIDE 2 MG/ML
0.5 INJECTION INTRAMUSCULAR; INTRAVENOUS; SUBCUTANEOUS ONCE
Refills: 0 | Status: DISCONTINUED | OUTPATIENT
Start: 2022-01-18 | End: 2022-01-18

## 2022-01-18 RX ORDER — INSULIN GLARGINE 100 [IU]/ML
28 INJECTION, SOLUTION SUBCUTANEOUS AT BEDTIME
Refills: 0 | Status: DISCONTINUED | OUTPATIENT
Start: 2022-01-18 | End: 2022-01-19

## 2022-01-18 RX ORDER — HYDROMORPHONE HYDROCHLORIDE 2 MG/ML
0.5 INJECTION INTRAMUSCULAR; INTRAVENOUS; SUBCUTANEOUS EVERY 4 HOURS
Refills: 0 | Status: DISCONTINUED | OUTPATIENT
Start: 2022-01-18 | End: 2022-01-25

## 2022-01-18 RX ADMIN — GABAPENTIN 200 MILLIGRAM(S): 400 CAPSULE ORAL at 06:34

## 2022-01-18 RX ADMIN — HEPARIN SODIUM 5000 UNIT(S): 5000 INJECTION INTRAVENOUS; SUBCUTANEOUS at 06:35

## 2022-01-18 RX ADMIN — OXYCODONE AND ACETAMINOPHEN 2 TABLET(S): 5; 325 TABLET ORAL at 22:49

## 2022-01-18 RX ADMIN — ATORVASTATIN CALCIUM 80 MILLIGRAM(S): 80 TABLET, FILM COATED ORAL at 22:39

## 2022-01-18 RX ADMIN — DULOXETINE HYDROCHLORIDE 20 MILLIGRAM(S): 30 CAPSULE, DELAYED RELEASE ORAL at 09:35

## 2022-01-18 RX ADMIN — PANTOPRAZOLE SODIUM 40 MILLIGRAM(S): 20 TABLET, DELAYED RELEASE ORAL at 06:35

## 2022-01-18 RX ADMIN — Medication 650 MILLIGRAM(S): at 11:17

## 2022-01-18 RX ADMIN — OXYCODONE AND ACETAMINOPHEN 1 TABLET(S): 5; 325 TABLET ORAL at 18:17

## 2022-01-18 RX ADMIN — HEPARIN SODIUM 5000 UNIT(S): 5000 INJECTION INTRAVENOUS; SUBCUTANEOUS at 22:38

## 2022-01-18 RX ADMIN — HYDROMORPHONE HYDROCHLORIDE 0.5 MILLIGRAM(S): 2 INJECTION INTRAMUSCULAR; INTRAVENOUS; SUBCUTANEOUS at 18:35

## 2022-01-18 RX ADMIN — Medication 650 MILLIGRAM(S): at 11:37

## 2022-01-18 RX ADMIN — Medication 25 GRAM(S): at 15:25

## 2022-01-18 RX ADMIN — INSULIN GLARGINE 28 UNIT(S): 100 INJECTION, SOLUTION SUBCUTANEOUS at 22:40

## 2022-01-18 RX ADMIN — GABAPENTIN 200 MILLIGRAM(S): 400 CAPSULE ORAL at 22:38

## 2022-01-18 RX ADMIN — HYDROMORPHONE HYDROCHLORIDE 1 MILLIGRAM(S): 2 INJECTION INTRAMUSCULAR; INTRAVENOUS; SUBCUTANEOUS at 19:44

## 2022-01-18 RX ADMIN — Medication 25 MICROGRAM(S): at 06:37

## 2022-01-18 RX ADMIN — OXYCODONE AND ACETAMINOPHEN 1 TABLET(S): 5; 325 TABLET ORAL at 10:54

## 2022-01-18 RX ADMIN — OXYCODONE AND ACETAMINOPHEN 1 TABLET(S): 5; 325 TABLET ORAL at 09:36

## 2022-01-18 RX ADMIN — DULOXETINE HYDROCHLORIDE 20 MILLIGRAM(S): 30 CAPSULE, DELAYED RELEASE ORAL at 22:39

## 2022-01-18 RX ADMIN — OXYCODONE AND ACETAMINOPHEN 1 TABLET(S): 5; 325 TABLET ORAL at 18:28

## 2022-01-18 RX ADMIN — HYDROMORPHONE HYDROCHLORIDE 0.5 MILLIGRAM(S): 2 INJECTION INTRAMUSCULAR; INTRAVENOUS; SUBCUTANEOUS at 18:45

## 2022-01-18 RX ADMIN — DAPTOMYCIN 124 MILLIGRAM(S): 500 INJECTION, POWDER, LYOPHILIZED, FOR SOLUTION INTRAVENOUS at 18:21

## 2022-01-18 RX ADMIN — OXYCODONE AND ACETAMINOPHEN 1 TABLET(S): 5; 325 TABLET ORAL at 03:24

## 2022-01-18 RX ADMIN — HYDROMORPHONE HYDROCHLORIDE 0.5 MILLIGRAM(S): 2 INJECTION INTRAMUSCULAR; INTRAVENOUS; SUBCUTANEOUS at 06:32

## 2022-01-18 RX ADMIN — CHLORHEXIDINE GLUCONATE 1 APPLICATION(S): 213 SOLUTION TOPICAL at 06:35

## 2022-01-18 RX ADMIN — SENNA PLUS 2 TABLET(S): 8.6 TABLET ORAL at 22:39

## 2022-01-18 RX ADMIN — OXYCODONE AND ACETAMINOPHEN 2 TABLET(S): 5; 325 TABLET ORAL at 23:30

## 2022-01-18 RX ADMIN — HYDROMORPHONE HYDROCHLORIDE 0.5 MILLIGRAM(S): 2 INJECTION INTRAMUSCULAR; INTRAVENOUS; SUBCUTANEOUS at 07:02

## 2022-01-18 NOTE — BRIEF OPERATIVE NOTE - NSICDXBRIEFPROCEDURE_GEN_ALL_CORE_FT
PROCEDURES:  Replacement, antibiotic spacer 06-Jan-2022 18:15:57  Sohail Solares  
PROCEDURES:  Right above knee amputation 10-Kris-2022 10:29:52  Chelo Carranza  
PROCEDURES:  Closure, wound, delayed 17-Jan-2022 20:09:36  Chelo Carranza  
PROCEDURES:  Closure, amputation, above knee, secondary 18-Jan-2022 14:31:06  De Jessy Martinez

## 2022-01-18 NOTE — PROGRESS NOTE ADULT - ASSESSMENT
66M hx of CAD with CABG , CHF (EF 20-25%) with AICD, T2DM c/b neuropathy with hx of diabetic ulcer, HLD. HTN, COPD, diverticulitis, celiac disease, anxiety and depression, cholecystostomy, R hip replacement 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 Castro (explant and abx spacer exchange), transferred here from [facility] for recurrent PJI for repeat spacer exchange and possible flap coverage with Dr. Vaughn/Dr. Bowen. Pt has been experiencing atraumatic worsening R knee pain x 1 month. Notes he went to a procedure center where his knee was aspirated 3 weeks ago. Pt was seen at [facility] yesterday where his knee was aspirated with 283k cell count. WBC 14, ESR 78, +, AVSS. Pt started on vancomycin + rifampin. 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 Now s/p Replacement, antibiotic spacer 06-Jan-2022. Also with hyperglycemia.  now s/p R AKA on vascular service     A1C: 9.0%  Weight: 97kg BMI 28  Cr/GFR: 0.9/104  EF: 20-25%

## 2022-01-18 NOTE — PROGRESS NOTE ADULT - SUBJECTIVE AND OBJECTIVE BOX
INTERVAL HPI/OVERNIGHT EVENTS:    Patient is a 66y old  Male who presents with a chief complaint of R Knee Pain (2022 08:50)  NPO today for OR for AKA closure.  BG dropped significantly with Lantus 35 with NPO overnight suggesting that higher doses of Lantus was acting as a blanket for overnight eating.  He reports not eating anything between dinner and bedtime last night    FSG & Insulin received:    Yesterday:  pre-dinner fs  nutritional lispro 9  units. Ate chicken, rice and fruit cup.  bedtime fs  lantus 35  units +  6  units lispro SS    Today:  pre-breakfast fs NPO  pre-lunch fs  NPO    Pt reports the following symptoms:    CONSTITUTIONAL:  Negative fever or chills, good appetite  EYES:  Negative  blurry vision or double vision  CARDIOVASCULAR:  Negative for chest pain or palpitations  RESPIRATORY:  Negative for cough, wheezing, or SOB   GASTROINTESTINAL:  Negative for nausea, vomiting, diarrhea, constipation, or abdominal pain  GENITOURINARY:  Negative frequency, urgency or dysuria  NEUROLOGIC:  No headache, confusion, dizziness, lightheadedness    MEDICATIONS  (STANDING):  aspirin enteric coated 81 milliGRAM(s) Oral daily  atorvastatin 80 milliGRAM(s) Oral at bedtime  chlorhexidine 2% Cloths 1 Application(s) Topical <User Schedule>  DAPTOmycin IVPB 600 milliGRAM(s) IV Intermittent every 24 hours  dextrose 40% Gel 15 Gram(s) Oral once  dextrose 5%. 1000 milliLiter(s) (50 mL/Hr) IV Continuous <Continuous>  dextrose 5%. 1000 milliLiter(s) (100 mL/Hr) IV Continuous <Continuous>  dextrose 50% Injectable 25 Gram(s) IV Push once  dextrose 50% Injectable 12.5 Gram(s) IV Push once  dextrose 50% Injectable 25 Gram(s) IV Push once  DULoxetine 20 milliGRAM(s) Oral two times a day  gabapentin 200 milliGRAM(s) Oral three times a day  glucagon  Injectable 1 milliGRAM(s) IntraMuscular once  heparin   Injectable 5000 Unit(s) SubCutaneous every 8 hours  insulin glargine Injectable (LANTUS) 35 Unit(s) SubCutaneous at bedtime  insulin lispro (ADMELOG) corrective regimen sliding scale   SubCutaneous Before meals and at bedtime  insulin lispro Injectable (ADMELOG) 9 Unit(s) SubCutaneous three times a day before meals  levothyroxine 25 MICROGram(s) Oral daily  pantoprazole    Tablet 40 milliGRAM(s) Oral before breakfast  senna 2 Tablet(s) Oral at bedtime    MEDICATIONS  (PRN):  acetaminophen     Tablet .. 650 milliGRAM(s) Oral every 6 hours PRN Moderate Pain (4 - 6)  oxycodone    5 mG/acetaminophen 325 mG 1 Tablet(s) Oral every 6 hours PRN Severe Pain (7 - 10)  simethicone 80 milliGRAM(s) Chew once PRN Gas  sodium chloride 0.9% lock flush 10 milliLiter(s) IV Push every 1 hour PRN Pre/post blood products, medications, blood draw, and to maintain line patency      PHYSICAL EXAM  Vital Signs Last 24 Hrs  T(C): 35.9 (2022 14:45), Max: 36.8 (2022 18:56)  T(F): 96.7 (2022 14:45), Max: 98.3 (2022 18:56)  HR: 67 (2022 15:59) (60 - 91)  BP: 121/59 (2022 15:59) (102/51 - 129/67)  BP(mean): 85 (2022 15:59) (73 - 91)  RR: 13 (2022 15:59) (12 - 20)  SpO2: 100% (2022 15:59) (96% - 100%)    Constitutional: NAD.   HEENT: NCAT, MMM, OP clear, EOMI, , no proptosis or lid retraction  Neck: no thyromegaly or palpable thyroid nodules   Respiratory: lungs CTAB.  Cardiovascular: regular rhythm, normal S1 and S2, no audible murmurs, no peripheral edema  GI: soft, NT/ND, no masses/HSM appreciated.  Neurology: no tremors  Skin: no visible rashes/lesions, R AKA, dark rash on left shin, cool to touch   Psychiatric: AAO x 3, tired    LABS:                        9.3    11.69 )-----------( 466      ( 2022 06:48 )             30.5     01-18    136  |  100  |  18  ----------------------------<  93  4.4   |  25  |  1.20    Ca    8.5      2022 06:48  Phos  3.3       Mg     2.0     -18      PT/INR - ( 2022 06:48 )   PT: 14.4 sec;   INR: 1.21          PTT - ( 2022 06:48 )  PTT:27.1 sec    Thyroid Stimulating Hormone, Serum: 2.850 uIU/mL ( @ 08:25)  Thyroid Stimulating Hormone, Serum: 1.68 uIU/mL ( @ 17:00)      HbA1C:   CAPILLARY BLOOD GLUCOSE      POCT Blood Glucose.: 138 mg/dL (2022 16:06)  POCT Blood Glucose.: 71 mg/dL (2022 15:14)  POCT Blood Glucose.: 80 mg/dL (2022 11:19)  POCT Blood Glucose.: 87 mg/dL (2022 07:05)  POCT Blood Glucose.: 270 mg/dL (2022 21:39)

## 2022-01-18 NOTE — PACU DISCHARGE NOTE - COMMENTS
Met PACU criteria for transfer to [facility], transferred via bed monitored, endorsed to NICANOR Clemens.
Patient is hemodynamically stable an reports pain managed.  Meets PACU discharge criteria.  Report given to unit RN.  Patient transported vi abed to unit.  Accompanied by PCAx2
pt met PACU d/c criteria report given to NICANOR Gomez.

## 2022-01-18 NOTE — PROGRESS NOTE ADULT - PROBLEM SELECTOR PLAN 1
Please decrease lantus  to 28 units at night .  Please increase lispro to 12  units before each meal.  Please continue lispro moderate dose sliding scale four times daily with meals and at bedtime    Pt's fingerstick glucose goal is 100-180.    Will continue to monitor     For discharge, pt can continue    Pt can follow up at discharge with Dr Schmitt at scheduled appt. 2/8.  Will discuss case with Dr. Luevano    and update primary team.

## 2022-01-18 NOTE — BRIEF OPERATIVE NOTE - NSICDXBRIEFPOSTOP_GEN_ALL_CORE_FT
POST-OP DIAGNOSIS:  Arthritis, septic, knee 10-Kris-2022 10:30:29  Chelo Carranza  
POST-OP DIAGNOSIS:  Arthritis, septic, knee 10-Kris-2022 10:30:29  Chelo Carranza  
POST-OP DIAGNOSIS:  Septic arthritis of knee 18-Jan-2022 14:31:28  De Jessy Martinez

## 2022-01-18 NOTE — PROGRESS NOTE ADULT - SUBJECTIVE AND OBJECTIVE BOX
Vascular Surgery Post-Op Note    Procedure: Right AKA closure    Diagnosis/Indication: Septic knee arthritis    Surgeon: VINICIUS Malloy MD    S: Pt has no complaints. Denies CP, SOB, JUNG, denies calf tenderness in left leg. Pain controlled with medication.    O:  T(C): 35.9 (01-18-22 @ 14:45), Max: 35.9 (01-18-22 @ 14:45)  T(F): 96.7 (01-18-22 @ 14:45), Max: 96.7 (01-18-22 @ 14:45)  HR: 67 (01-18-22 @ 15:59) (60 - 67)  BP: 121/59 (01-18-22 @ 15:59) (102/51 - 121/59)  RR: 13 (01-18-22 @ 15:59) (12 - 20)  SpO2: 100% (01-18-22 @ 15:59) (96% - 100%)  Wt(kg): --                        9.3    11.69 )-----------( 466      ( 18 Jan 2022 06:48 )             30.5     01-18    136  |  100  |  18  ----------------------------<  93  4.4   |  25  |  1.20    Ca    8.5      18 Jan 2022 06:48  Phos  3.3     01-18  Mg     2.0     01-18        Gen: NAD, resting comfortably in bed  C/V: NSR  Pulm: Nonlabored breathing, no respiratory distress, on nasal cannula  Abd: soft, NT/ND  Extrem: RLE incision site c/d/i with ace bandage in place without saturation. Without erythema or edema noted.      A/P: 66yMale s/p above procedure  Diet: Diabetic diet  Pain/nausea control  DVT ppx: HSQ  Plan for dressing change on Friday (01/21/22)

## 2022-01-18 NOTE — PROGRESS NOTE ADULT - SUBJECTIVE AND OBJECTIVE BOX
24 hr events: O/N: lantus down to 35u at bedtime, COVID neg      SUBJECTIVE: Patient seen at bedside complaining of some phantom limb pain. No CP, SOB, fevers or chills    Vital Signs Last 24 Hrs  T(C): 36.1 (18 Jan 2022 06:09), Max: 36.8 (17 Jan 2022 18:56)  T(F): 96.9 (18 Jan 2022 06:09), Max: 98.3 (17 Jan 2022 18:56)  HR: 80 (18 Jan 2022 00:49) (77 - 91)  BP: 129/67 (18 Jan 2022 00:49) (108/63 - 129/67)  BP(mean): 91 (18 Jan 2022 00:49) (81 - 91)  RR: 18 (18 Jan 2022 00:49) (17 - 18)  SpO2: 97% (18 Jan 2022 00:49) (96% - 97%)    Physical Exam:  Gen: NAD, resting comfortably in bed  C/V: NSR  Pulm: Nonlabored breathing, no respiratory distress  Abd: soft, NT/ND  Extrem: RLE AKA  wound was clean, dry and intact. healthy tissue, pink and well perfused. minimal bleeding, Xeroform, gauze, kerlix and ace wraps used to redress stump.    Lines/drains/tubes:    I&O's Summary    16 Jan 2022 07:01  -  17 Jan 2022 07:00  --------------------------------------------------------  IN: 763 mL / OUT: 675 mL / NET: 88 mL    17 Jan 2022 07:01  -  18 Jan 2022 06:40  --------------------------------------------------------  IN: 480 mL / OUT: 750 mL / NET: -270 mL        LABS:                        9.2    12.63 )-----------( 449      ( 17 Jan 2022 06:11 )             30.8     01-17    135  |  98  |  18  ----------------------------<  205<H>  4.1   |  26  |  1.30    Ca    8.6      17 Jan 2022 06:11  Phos  3.6     01-17  Mg     1.8     01-17          CAPILLARY BLOOD GLUCOSE      POCT Blood Glucose.: 270 mg/dL (17 Jan 2022 21:39)  POCT Blood Glucose.: 125 mg/dL (17 Jan 2022 16:16)  POCT Blood Glucose.: 97 mg/dL (17 Jan 2022 11:28)  POCT Blood Glucose.: 193 mg/dL (17 Jan 2022 06:44)        RADIOLOGY & ADDITIONAL STUDIES:    ---------------------------------------------------------------------------  PLEASE CHECK WHEN PRESENT:  [ x ] Heart Failure  [  ] Acute  [  ] Acute on Chronic  [x  ] Chronic  -------------------------------------------------------------------  [  ]Diastolic [HFpEF]  [x  ]Systolic [HFrEF]  [  ]Combined [HFpEF & HFrEF]  [  ] afib  [x  ] hypertensive heart disease  [  ]Other:  -------------------------------------------------------------------  [ ] Respiratory failure  [ ] Acute cor pulmonale  [ ] Asthma/COPD Exacerbation  [ ] Pleural effusion  [ ] Aspiration pneumonia  -------------------------------------------------------------------  [  ]MOISES  [  ]ATN  [  ]Reneal Medullary Necrosis  [  ]Renal Cortical Necrosis  [  ]Other Pathological Lesions:    [  ]CKD 1  [  ]CKD 2  [  ]CKD 3  [  ]CKD 4  [  ]CKD 5  [  ]Other  -------------------------------------------------------------------  [ x ]Diabetes  [  ] Diabetic PVD Ulcer  [  ] Neuropathic ulcer to DM  [  ] Diabetes with Nephropathy  [  ] Osteomyelitis due to diabetes  --------------------------------------------------------------------  [  ]Malnutrition: See Nutrition Note  [  ]Cachexia  [  ]Other:   [  ]Supplement Ordered:  [  ]Morbid Obesity (BMI >=40]  ---------------------------------------------------------------------  [ ] Sepsis/severe sepsis/septic shock  [ ] UTI  [ ] Pneumonia  -----------------------------------------------------------------------  [ ] Acidosis/alkalosis  [ ] Fluid overload  [ ] Hypokalemia  [ ] Hyperkalemia  [ ] Hypomagnesemia  [ ] Hypophosphatemia  [ ] Hyperphosphatemia  ------------------------------------------------------------------------  [ ] Acute blood loss anemia  [ ] Post op blood loss anemia  [ ] Iron deficiency anemia  [ ] Anemia due to chronic disease  [ ] Hypercoagulable state  ----------------------------------------------------------------------  [ ] Cerebral infarction  [ ] Transient ischemia attack  [ ] Encephalopathy        A/P: 66yMale Hx CAD, CAD s/p MIDCAB/VERONICA 2018, AICD (2/2020), CHF (EF 15-20%), DM, 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 He was taken to OR with ortho on Revision gastroc flap by PRS, antibiotic cement spacer removal, I&D, replacement on 1/6, then taken to OR with vascular on 1/10 for right AKA. taken to OR for completion AKA today, but hypotensive on induction, required pressors, procedure aborted, kept intubated and sent to SICU. Extubated in SICU on 1/14 and stepped down.     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   - abx duratiion 4 weeks    DM:  -ISS  -Lantus 35, lispro 9 TID    Diet:   Consistent carb    Activity:   -PT/OT consult  - anticipate acute rehab placement when medically ready for discharge     DVTPPx: HSQ    Dispo:  pending closure of right above knee amputation today

## 2022-01-18 NOTE — BRIEF OPERATIVE NOTE - NSICDXBRIEFPREOP_GEN_ALL_CORE_FT
PRE-OP DIAGNOSIS:  Septic arthritis of knee 10-Kris-2022 10:30:09  Chelo Carranza  

## 2022-01-18 NOTE — BRIEF OPERATIVE NOTE - OPERATION/FINDINGS
Delayed primary closure of above knee amputation; alternating staples and 0-0 Nylon. Right femur sent for cx. Xeroform, 4x8, Kerlix, ACE to right amputation stump. Delayed primary closure of right above knee amputation; alternating staples and 0-0 Nylon. Hemostasis achieved. Right femur sent for rosa. Xeroform, 4x8, Kerlix, ACE to right amputation stump. Approx 10cm distal femur resected. Delayed primary closure of right above knee amputation; alternating staples and 0-0 Nylon. Hemostasis achieved. Right femur sent for cx. Xeroform, 4x8 gauze, Kerlix, ACE to right amputation stump.

## 2022-01-19 LAB
ANION GAP SERPL CALC-SCNC: 10 MMOL/L — SIGNIFICANT CHANGE UP (ref 5–17)
BASOPHILS # BLD AUTO: 0.11 K/UL — SIGNIFICANT CHANGE UP (ref 0–0.2)
BASOPHILS NFR BLD AUTO: 1 % — SIGNIFICANT CHANGE UP (ref 0–2)
BUN SERPL-MCNC: 18 MG/DL — SIGNIFICANT CHANGE UP (ref 7–23)
CALCIUM SERPL-MCNC: 8.5 MG/DL — SIGNIFICANT CHANGE UP (ref 8.4–10.5)
CHLORIDE SERPL-SCNC: 99 MMOL/L — SIGNIFICANT CHANGE UP (ref 96–108)
CO2 SERPL-SCNC: 25 MMOL/L — SIGNIFICANT CHANGE UP (ref 22–31)
CREAT SERPL-MCNC: 1.23 MG/DL — SIGNIFICANT CHANGE UP (ref 0.5–1.3)
EOSINOPHIL # BLD AUTO: 0.33 K/UL — SIGNIFICANT CHANGE UP (ref 0–0.5)
EOSINOPHIL NFR BLD AUTO: 2.9 % — SIGNIFICANT CHANGE UP (ref 0–6)
GLUCOSE BLDC GLUCOMTR-MCNC: 110 MG/DL — HIGH (ref 70–99)
GLUCOSE BLDC GLUCOMTR-MCNC: 142 MG/DL — HIGH (ref 70–99)
GLUCOSE BLDC GLUCOMTR-MCNC: 235 MG/DL — HIGH (ref 70–99)
GLUCOSE BLDC GLUCOMTR-MCNC: 47 MG/DL — CRITICAL LOW (ref 70–99)
GLUCOSE BLDC GLUCOMTR-MCNC: 54 MG/DL — CRITICAL LOW (ref 70–99)
GLUCOSE BLDC GLUCOMTR-MCNC: 80 MG/DL — SIGNIFICANT CHANGE UP (ref 70–99)
GLUCOSE SERPL-MCNC: 104 MG/DL — HIGH (ref 70–99)
HCT VFR BLD CALC: 30.1 % — LOW (ref 39–50)
HGB BLD-MCNC: 9 G/DL — LOW (ref 13–17)
IMM GRANULOCYTES NFR BLD AUTO: 0.4 % — SIGNIFICANT CHANGE UP (ref 0–1.5)
LYMPHOCYTES # BLD AUTO: 1.05 K/UL — SIGNIFICANT CHANGE UP (ref 1–3.3)
LYMPHOCYTES # BLD AUTO: 9.3 % — LOW (ref 13–44)
MAGNESIUM SERPL-MCNC: 1.9 MG/DL — SIGNIFICANT CHANGE UP (ref 1.6–2.6)
MCHC RBC-ENTMCNC: 25.1 PG — LOW (ref 27–34)
MCHC RBC-ENTMCNC: 29.9 GM/DL — LOW (ref 32–36)
MCV RBC AUTO: 84.1 FL — SIGNIFICANT CHANGE UP (ref 80–100)
MONOCYTES # BLD AUTO: 1.25 K/UL — HIGH (ref 0–0.9)
MONOCYTES NFR BLD AUTO: 11.1 % — SIGNIFICANT CHANGE UP (ref 2–14)
NEUTROPHILS # BLD AUTO: 8.52 K/UL — HIGH (ref 1.8–7.4)
NEUTROPHILS NFR BLD AUTO: 75.3 % — SIGNIFICANT CHANGE UP (ref 43–77)
NRBC # BLD: 0 /100 WBCS — SIGNIFICANT CHANGE UP (ref 0–0)
PHOSPHATE SERPL-MCNC: 4.5 MG/DL — SIGNIFICANT CHANGE UP (ref 2.5–4.5)
PLATELET # BLD AUTO: 492 K/UL — HIGH (ref 150–400)
POTASSIUM SERPL-MCNC: 4.7 MMOL/L — SIGNIFICANT CHANGE UP (ref 3.5–5.3)
POTASSIUM SERPL-SCNC: 4.7 MMOL/L — SIGNIFICANT CHANGE UP (ref 3.5–5.3)
RBC # BLD: 3.58 M/UL — LOW (ref 4.2–5.8)
RBC # FLD: 18.4 % — HIGH (ref 10.3–14.5)
SODIUM SERPL-SCNC: 134 MMOL/L — LOW (ref 135–145)
WBC # BLD: 11.3 K/UL — HIGH (ref 3.8–10.5)
WBC # FLD AUTO: 11.3 K/UL — HIGH (ref 3.8–10.5)

## 2022-01-19 PROCEDURE — 99232 SBSQ HOSP IP/OBS MODERATE 35: CPT | Mod: GC

## 2022-01-19 PROCEDURE — 99233 SBSQ HOSP IP/OBS HIGH 50: CPT

## 2022-01-19 RX ORDER — ACETAMINOPHEN 500 MG
975 TABLET ORAL EVERY 8 HOURS
Refills: 0 | Status: DISCONTINUED | OUTPATIENT
Start: 2022-01-19 | End: 2022-01-27

## 2022-01-19 RX ORDER — SPIRONOLACTONE 25 MG/1
25 TABLET, FILM COATED ORAL DAILY
Refills: 0 | Status: DISCONTINUED | OUTPATIENT
Start: 2022-01-19 | End: 2022-01-27

## 2022-01-19 RX ORDER — GABAPENTIN 400 MG/1
300 CAPSULE ORAL THREE TIMES A DAY
Refills: 0 | Status: DISCONTINUED | OUTPATIENT
Start: 2022-01-19 | End: 2022-01-27

## 2022-01-19 RX ORDER — INSULIN LISPRO 100/ML
10 VIAL (ML) SUBCUTANEOUS
Refills: 0 | Status: DISCONTINUED | OUTPATIENT
Start: 2022-01-19 | End: 2022-01-21

## 2022-01-19 RX ORDER — CYCLOBENZAPRINE HYDROCHLORIDE 10 MG/1
5 TABLET, FILM COATED ORAL THREE TIMES A DAY
Refills: 0 | Status: DISCONTINUED | OUTPATIENT
Start: 2022-01-19 | End: 2022-01-27

## 2022-01-19 RX ORDER — OXYCODONE HYDROCHLORIDE 5 MG/1
10 TABLET ORAL EVERY 4 HOURS
Refills: 0 | Status: DISCONTINUED | OUTPATIENT
Start: 2022-01-19 | End: 2022-01-25

## 2022-01-19 RX ORDER — OXYCODONE HYDROCHLORIDE 5 MG/1
5 TABLET ORAL EVERY 4 HOURS
Refills: 0 | Status: DISCONTINUED | OUTPATIENT
Start: 2022-01-19 | End: 2022-01-25

## 2022-01-19 RX ORDER — INSULIN GLARGINE 100 [IU]/ML
25 INJECTION, SOLUTION SUBCUTANEOUS AT BEDTIME
Refills: 0 | Status: DISCONTINUED | OUTPATIENT
Start: 2022-01-19 | End: 2022-01-24

## 2022-01-19 RX ADMIN — Medication 975 MILLIGRAM(S): at 22:01

## 2022-01-19 RX ADMIN — OXYCODONE HYDROCHLORIDE 10 MILLIGRAM(S): 5 TABLET ORAL at 22:01

## 2022-01-19 RX ADMIN — CHLORHEXIDINE GLUCONATE 1 APPLICATION(S): 213 SOLUTION TOPICAL at 05:26

## 2022-01-19 RX ADMIN — DULOXETINE HYDROCHLORIDE 20 MILLIGRAM(S): 30 CAPSULE, DELAYED RELEASE ORAL at 09:49

## 2022-01-19 RX ADMIN — Medication 25 MICROGRAM(S): at 05:26

## 2022-01-19 RX ADMIN — PANTOPRAZOLE SODIUM 40 MILLIGRAM(S): 20 TABLET, DELAYED RELEASE ORAL at 06:06

## 2022-01-19 RX ADMIN — OXYCODONE HYDROCHLORIDE 10 MILLIGRAM(S): 5 TABLET ORAL at 23:00

## 2022-01-19 RX ADMIN — HYDROMORPHONE HYDROCHLORIDE 0.5 MILLIGRAM(S): 2 INJECTION INTRAMUSCULAR; INTRAVENOUS; SUBCUTANEOUS at 00:29

## 2022-01-19 RX ADMIN — HEPARIN SODIUM 5000 UNIT(S): 5000 INJECTION INTRAVENOUS; SUBCUTANEOUS at 15:22

## 2022-01-19 RX ADMIN — DULOXETINE HYDROCHLORIDE 20 MILLIGRAM(S): 30 CAPSULE, DELAYED RELEASE ORAL at 22:01

## 2022-01-19 RX ADMIN — ATORVASTATIN CALCIUM 80 MILLIGRAM(S): 80 TABLET, FILM COATED ORAL at 22:00

## 2022-01-19 RX ADMIN — HEPARIN SODIUM 5000 UNIT(S): 5000 INJECTION INTRAVENOUS; SUBCUTANEOUS at 22:00

## 2022-01-19 RX ADMIN — HEPARIN SODIUM 5000 UNIT(S): 5000 INJECTION INTRAVENOUS; SUBCUTANEOUS at 05:26

## 2022-01-19 RX ADMIN — Medication 81 MILLIGRAM(S): at 12:16

## 2022-01-19 RX ADMIN — GABAPENTIN 300 MILLIGRAM(S): 400 CAPSULE ORAL at 22:01

## 2022-01-19 RX ADMIN — GABAPENTIN 200 MILLIGRAM(S): 400 CAPSULE ORAL at 15:22

## 2022-01-19 RX ADMIN — HYDROMORPHONE HYDROCHLORIDE 0.5 MILLIGRAM(S): 2 INJECTION INTRAMUSCULAR; INTRAVENOUS; SUBCUTANEOUS at 13:30

## 2022-01-19 RX ADMIN — Medication 975 MILLIGRAM(S): at 23:00

## 2022-01-19 RX ADMIN — OXYCODONE AND ACETAMINOPHEN 2 TABLET(S): 5; 325 TABLET ORAL at 04:04

## 2022-01-19 RX ADMIN — CYCLOBENZAPRINE HYDROCHLORIDE 5 MILLIGRAM(S): 10 TABLET, FILM COATED ORAL at 17:46

## 2022-01-19 RX ADMIN — Medication 12 UNIT(S): at 08:46

## 2022-01-19 RX ADMIN — INSULIN GLARGINE 25 UNIT(S): 100 INJECTION, SOLUTION SUBCUTANEOUS at 22:02

## 2022-01-19 RX ADMIN — GABAPENTIN 200 MILLIGRAM(S): 400 CAPSULE ORAL at 05:27

## 2022-01-19 RX ADMIN — DAPTOMYCIN 124 MILLIGRAM(S): 500 INJECTION, POWDER, LYOPHILIZED, FOR SOLUTION INTRAVENOUS at 21:08

## 2022-01-19 RX ADMIN — HYDROMORPHONE HYDROCHLORIDE 0.5 MILLIGRAM(S): 2 INJECTION INTRAMUSCULAR; INTRAVENOUS; SUBCUTANEOUS at 06:30

## 2022-01-19 RX ADMIN — Medication 4: at 22:02

## 2022-01-19 RX ADMIN — HYDROMORPHONE HYDROCHLORIDE 0.5 MILLIGRAM(S): 2 INJECTION INTRAMUSCULAR; INTRAVENOUS; SUBCUTANEOUS at 12:53

## 2022-01-19 RX ADMIN — OXYCODONE AND ACETAMINOPHEN 2 TABLET(S): 5; 325 TABLET ORAL at 10:24

## 2022-01-19 RX ADMIN — OXYCODONE AND ACETAMINOPHEN 2 TABLET(S): 5; 325 TABLET ORAL at 05:25

## 2022-01-19 RX ADMIN — HYDROMORPHONE HYDROCHLORIDE 0.5 MILLIGRAM(S): 2 INJECTION INTRAMUSCULAR; INTRAVENOUS; SUBCUTANEOUS at 05:27

## 2022-01-19 RX ADMIN — OXYCODONE AND ACETAMINOPHEN 2 TABLET(S): 5; 325 TABLET ORAL at 09:50

## 2022-01-19 RX ADMIN — SPIRONOLACTONE 25 MILLIGRAM(S): 25 TABLET, FILM COATED ORAL at 12:54

## 2022-01-19 RX ADMIN — OXYCODONE HYDROCHLORIDE 10 MILLIGRAM(S): 5 TABLET ORAL at 17:30

## 2022-01-19 RX ADMIN — OXYCODONE HYDROCHLORIDE 10 MILLIGRAM(S): 5 TABLET ORAL at 18:15

## 2022-01-19 RX ADMIN — HYDROMORPHONE HYDROCHLORIDE 0.5 MILLIGRAM(S): 2 INJECTION INTRAMUSCULAR; INTRAVENOUS; SUBCUTANEOUS at 01:04

## 2022-01-19 NOTE — DISCHARGE NOTE PROVIDER - NSDCHC_MEDRECSTATUS_GEN_ALL_CORE
Checking on patient from yesterday's sick call. LVMTCB to speak to triage RN or clinic RN to see if pt needs to see APN for ACV or fluids. MD wanting pt to come in if he is not even slightly better. Admission Reconciliation is Completed  Discharge Reconciliation is Not Complete Admission Reconciliation is Completed  Discharge Reconciliation is Completed

## 2022-01-19 NOTE — PROGRESS NOTE ADULT - SUBJECTIVE AND OBJECTIVE BOX
INTERVAL HISTORY:      MEDICATIONS:  MEDICATIONS  (STANDING):  aspirin enteric coated 81 milliGRAM(s) Oral daily  atorvastatin 80 milliGRAM(s) Oral at bedtime  chlorhexidine 2% Cloths 1 Application(s) Topical <User Schedule>  DAPTOmycin IVPB 600 milliGRAM(s) IV Intermittent every 24 hours  dextrose 40% Gel 15 Gram(s) Oral once  dextrose 5%. 1000 milliLiter(s) (50 mL/Hr) IV Continuous <Continuous>  dextrose 5%. 1000 milliLiter(s) (100 mL/Hr) IV Continuous <Continuous>  dextrose 50% Injectable 25 Gram(s) IV Push once  dextrose 50% Injectable 12.5 Gram(s) IV Push once  dextrose 50% Injectable 25 Gram(s) IV Push once  DULoxetine 20 milliGRAM(s) Oral two times a day  gabapentin 200 milliGRAM(s) Oral three times a day  glucagon  Injectable 1 milliGRAM(s) IntraMuscular once  heparin   Injectable 5000 Unit(s) SubCutaneous every 8 hours  insulin glargine Injectable (LANTUS) 28 Unit(s) SubCutaneous at bedtime  insulin lispro (ADMELOG) corrective regimen sliding scale   SubCutaneous Before meals and at bedtime  insulin lispro Injectable (ADMELOG) 12 Unit(s) SubCutaneous three times a day before meals  levothyroxine 25 MICROGram(s) Oral daily  pantoprazole    Tablet 40 milliGRAM(s) Oral before breakfast  senna 2 Tablet(s) Oral at bedtime      REVIEW OF SYSTEMS:  CONSTITUTIONAL: No fever, no weight loss, no fatigue  PULMONARY: No cough, no wheezing, chills no hemoptysis; No Shortness of Breath  CARDIOVASCULAR: No chest pain, no palpitations, no syncope, dizziness, no leg swelling  GASTROINTESTINAL: No abdominal pain. No nausea, no  vomiting, no hematemesis; No diarrhea, no constipation. No melena, no hematochezia.  GENITOURINARY: No dysuria, no frequency, no hematuria, no incontinence  SKIN: No itching, no burning, no rashes, no lesions     PHYSICAL EXAM:  T(C): 36.1 (01-19-22 @ 06:20), Max: 36.4 (01-18-22 @ 22:39)  HR: 80 (01-19-22 @ 05:37) (60 - 83)  BP: 150/73 (01-19-22 @ 05:37) (102/51 - 186/79)  RR: 18 (01-19-22 @ 05:37) (12 - 20)  SpO2: 98% (01-19-22 @ 05:37) (96% - 100%)  Wt(kg): --  I&O's Summary    18 Jan 2022 07:01  -  19 Jan 2022 07:00  --------------------------------------------------------  IN: 0 mL / OUT: 500 mL / NET: -500 mL        Appearance:  No deformities, normal appearance	  HEENT:   Normal oral mucosa, PERRL, EOMI	  Neck: No jvd , no masses  Lymphatic: No  lymphadenopathy  Pulmonary: Lungs clear to auscultation and percussion  Cardiovascular: Normal S1 S2, No JVD, No  murmur, No edema	  Abdomen:  Soft, Non-tender, + BS  Skin: No rashes, No ecchymoses	  Extremities:  No clubbing or cyanosis, R AKA  MSK: Normal range of motion, no joint swelling    LABS:		  CARDIAC MARKERS:                         9.0    11.30 )-----------( 492      ( 19 Jan 2022 08:03 )             30.1   01-19    134<L>  |  99  |  18  ----------------------------<  104<H>  4.7   |  25  |  1.23    Ca    8.5      19 Jan 2022 08:03  Phos  4.5     01-19  Mg     1.9     01-19      proBNP:  Lipid Profile:  HgA1c:  TSH:  PT/INR - ( 18 Jan 2022 06:48 )   PT: 14.4 sec;   INR: 1.21          PTT - ( 18 Jan 2022 06:48 )  PTT:27.1 sec    Culture - Tissue with Gram Stain (collected 01-18-22 @ 16:05)  Source: .Tissue Right Femur  Gram Stain (01-18-22 @ 16:06):    Test cannot be performed on this type of specimen.    EKG A sense, V pace nsr  TELEMETRY: V pace	      QTC     ECG:  	   QTC         RADIOLOGY: chest 1-15 clear  DIAGNOSTIC TESTING: [ ] Echocardiogram: [ ]  Catheterization: [ ] Stress Test:    PMH, medications, allergies Chart notes, vital signs, laboratory data, and radiology studies reviewed.    ASSESSMENT/PLAN: 	  Congestive heart failure–patient has severely reduced ejection fraction.  He is comfortable and euvolemic.  Restart spironolactone and metoprolol later.    Coronary artery disease–the patient is chest pain-free.  The patient had stent thrombosis.  The plan is long-term prasugrel.  This should be restarted when stable postoperatively.    Diabetes–follow sugars.    Septic arthritis–status post AKA.  Afebrile with mildly elevated white blood count.  On antibiotics.      Discussed with nursing staff.

## 2022-01-19 NOTE — PROGRESS NOTE ADULT - PROBLEM SELECTOR PLAN 1
Please decrease lantus  to 25 units at night .  Please decrease lispro to 10  units before each meal.  Please continue lispro moderate dose sliding scale four times daily with meals and at bedtime    Pt's fingerstick glucose goal is 100-180.    Will continue to monitor     For discharge, pt can continue    Pt can follow up at discharge with Dr Schmitt at scheduled appt. 2/8.  Will discuss case with Dr. Luevano    and update primary team.

## 2022-01-19 NOTE — PROGRESS NOTE ADULT - PROBLEM SELECTOR PLAN 1
Followup on the cultures from the OR which is positive for MRSA  s/p right AKA, 1/10/22  he is on daptomycin and followed by ID

## 2022-01-19 NOTE — PROGRESS NOTE ADULT - SUBJECTIVE AND OBJECTIVE BOX
Interval Events: Reviewed  Patient seen and examined at bedside.    Patient is a 66y old  Male who presents with a chief complaint of R Knee Pain (19 Jan 2022 19:05)    he is feeling better but he has pain in the leg  PAST MEDICAL & SURGICAL HISTORY:  Status post percutaneous transluminal coronary angioplasty  2 stents    Atherosclerosis of coronary artery  CAD (coronary artery disease)    Osteoarthritis    HLD (hyperlipidemia)    Blood clot due to device, implant, or graft  was on blood thinners    Diabetes  on insulin pump    HTN (hypertension)    CHF (congestive heart failure)  EF ~ 25%    History of celiac disease    Diverticulitis    STEMI (ST elevation myocardial infarction)    Diabetic neuropathy    Anxiety and depression    Preoperative clearance    Other postprocedural status  Fixation hardware in foot LEFT    Stented coronary artery  10/18 heart attack  INFERIOR WALL MI    S/P CABG x 1  2018    S/P TKR (total knee replacement), right  with infection Mrsa   per pt he was cleared from MRSA infection    Surgery, elective  right knee wound debridement    History of open reduction and internal fixation (ORIF) procedure    H/O shoulder surgery  right    AICD (automatic cardioverter/defibrillator) present    Cholecystostomy care  drain inserted 2020 &amp; removed 4 months ago    History of tonsillectomy    History of hip replacement, total, right        MEDICATIONS:  Pulmonary:    Antimicrobials:  DAPTOmycin IVPB 600 milliGRAM(s) IV Intermittent every 24 hours    Anticoagulants:  aspirin enteric coated 81 milliGRAM(s) Oral daily  heparin   Injectable 5000 Unit(s) SubCutaneous every 8 hours    Cardiac:  spironolactone 25 milliGRAM(s) Oral daily      Allergies    ACE inhibitors (Hives)  carvedilol (Other)  enalapril (Hives)  Entresto (Other)    Intolerances        Vital Signs Last 24 Hrs  T(C): 36.7 (19 Jan 2022 18:53), Max: 36.8 (19 Jan 2022 13:58)  T(F): 98 (19 Jan 2022 18:53), Max: 98.2 (19 Jan 2022 13:58)  HR: 83 (19 Jan 2022 17:30) (77 - 83)  BP: 156/70 (19 Jan 2022 17:30) (140/74 - 184/72)  BP(mean): --  RR: 18 (19 Jan 2022 17:30) (18 - 18)  SpO2: 100% (19 Jan 2022 17:30) (98% - 100%)    01-18 @ 07:01 - 01-19 @ 07:00  --------------------------------------------------------  IN: 0 mL / OUT: 500 mL / NET: -500 mL    01-19 @ 07:01 - 01-19 @ 20:23  --------------------------------------------------------  IN: 660 mL / OUT: 200 mL / NET: 460 mL          Review of Systems:   •	General: negative  •	Skin/Breast: negative  •	Ophthalmologic: negative  •	ENMT: negative  •	Respiratory and Thorax: negative  •	Cardiovascular: negative  •	Gastrointestinal: negative  •	Genitourinary: negative  •	Musculoskeletal: negative  •	Neurological: negative  •	Psychiatric: negative  •	Hematology/Lymphatics: negative  •	Endocrine: negative  •	Allergic/Immunologic: negative    Physical Exam:   • Constitutional:	refer to the dietion /Nutritionist note  • Eyes:	EOMI; PERRL; no drainage or redness  • ENMT:	No oral lesions; no gross abnormalities  • Neck	No bruits; no thyromegaly or nodules  • Breasts:	not examined  • Back:	No deformity or limitation of movement  • Respiratory:	Breath Sounds equal & clear to percussion & auscultation, no accessory muscle use  • Cardiovascular:	Regular rate & rhythm, normal S1, S2; no murmurs, gallops or rubs; no S3, S4  • Gastrointestinal:	Soft, non-tender, no hepatosplenomegaly, normal bowel sounds  • Genitourinary:	not examined  • Rectal: not examined  • Extremities:	No cyanosis, clubbing or edema  • Vascular:	Equal and normal pulses (carotid, femoral, dorsalis pedis)  • Neurologica:l	not examined  • Skin:	No lesions; no rash  • Lymph Nodes:	No lymphadedenopathy  • Musculoskeletal:	No joint pain, swelling or deformity; no limitation of movement        LABS:      CBC Full  -  ( 19 Jan 2022 08:03 )  WBC Count : 11.30 K/uL  RBC Count : 3.58 M/uL  Hemoglobin : 9.0 g/dL  Hematocrit : 30.1 %  Platelet Count - Automated : 492 K/uL  Mean Cell Volume : 84.1 fl  Mean Cell Hemoglobin : 25.1 pg  Mean Cell Hemoglobin Concentration : 29.9 gm/dL  Auto Neutrophil # : 8.52 K/uL  Auto Lymphocyte # : 1.05 K/uL  Auto Monocyte # : 1.25 K/uL  Auto Eosinophil # : 0.33 K/uL  Auto Basophil # : 0.11 K/uL  Auto Neutrophil % : 75.3 %  Auto Lymphocyte % : 9.3 %  Auto Monocyte % : 11.1 %  Auto Eosinophil % : 2.9 %  Auto Basophil % : 1.0 %    01-19    134<L>  |  99  |  18  ----------------------------<  104<H>  4.7   |  25  |  1.23    Ca    8.5      19 Jan 2022 08:03  Phos  4.5     01-19  Mg     1.9     01-19      PT/INR - ( 18 Jan 2022 06:48 )   PT: 14.4 sec;   INR: 1.21          PTT - ( 18 Jan 2022 06:48 )  PTT:27.1 sec                Culture Results:   No growth to date (01-18 @ 16:05)      RADIOLOGY & ADDITIONAL STUDIES (The following images were personally reviewed):

## 2022-01-19 NOTE — CONSULT NOTE ADULT - SUBJECTIVE AND OBJECTIVE BOX
Pain Management Progress Note - [facility] [phone]    HPI: Patient seen and examined today. 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 Patient with reports of right limb pain. Patient reports pain in the limb feels "burning" and sharp. Current hospital regimen includes Perccoet 5/325 mg 1 tab po q6h prn moderate pain, Percocet 5/325 mg 2tabs PO q4h PRN severe pain, Gabapentin 200 mg PO TID, and Dilaudid 0.5 mg IVP q4h PRN breakthrough pain. Patient reports pain improves moderately with PO Percocet, but still with significant pain. Patient denies numbness/tingling, patient denies side effects from current pain regimen.    Pertinent PMH: Pain at: ___Back ___Neck__X_Knee ___Hip ___Shoulder ___ Opioid tolerance    Pain is _X__ sharp ____dull ___burning ___achy ___ Intensity: ____ mild _X___mod _X___severe     Location __X___surgical site _____cervical _____lumbar ____abd _____upper ext___X_lower ext    Worse with __X__activity __X__movement _____physical therapy___ Rest    Improved with ___X_medication ___X_rest ____physical therapy    PMH:  Status post percutaneous transluminal coronary angioplasty  2 stents  Atherosclerosis of coronary artery  CAD (coronary artery disease)  Osteoarthritis  HLD (hyperlipidemia)  Blood clot due to device, implant, or graft  was on blood thinners  Diabetes  on insulin pump  HTN (hypertension)  CHF (congestive heart failure)  EF ~ 25%  History of celiac disease  Diverticulitis  STEMI (ST elevation myocardial infarction)  Diabetic neuropathy  Anxiety and depression  Other postprocedural status  Fixation hardware in foot LEFT  Stented coronary artery  10/18 heart attack  INFERIOR WALL MI  S/P CABG x 1  2018  S/P TKR (total knee replacement), right  with infection Mrsa   per pt he was cleared from MRSA infection  Surgery, elective  right knee wound debridement  History of open reduction and internal fixation (ORIF) procedure  H/O shoulder surgery  right  AICD (automatic cardioverter/defibrillator) present  Cholecystostomy care  drain inserted 2020 &amp; removed 4 months ago  History of tonsillectomy  History of hip replacement, total, right    Medications:  spironolactone  oxycodone    5 mG/acetaminophen 325 mG  HYDROmorphone  Injectable  oxycodone    5 mG/acetaminophen 325 mG  oxycodone    5 mG/acetaminophen 325 mG  HYDROmorphone  Injectable  insulin lispro Injectable (ADMELOG)  insulin glargine Injectable (LANTUS)  dextrose 50% Injectable  dextrose 50% Injectable  dextrose 5% + sodium chloride 0.45%.  HYDROmorphone  Injectable  insulin glargine Injectable (LANTUS)  simethicone  aspirin enteric coated  HYDROmorphone  Injectable  simethicone  magnesium oxide  HYDROmorphone  Injectable  acetaminophen     Tablet ..  oxycodone    5 mG/acetaminophen 325 mG  gabapentin  DULoxetine  insulin glargine Injectable (LANTUS)  acetaminophen   IVPB ..  HYDROmorphone  Injectable  melatonin  acetaminophen   IVPB ..  insulin glargine Injectable (LANTUS)  HYDROmorphone  Injectable  HYDROmorphone  Injectable  acetaminophen   IVPB ..  HYDROmorphone  Injectable  senna  insulin glargine Injectable (LANTUS)  magnesium sulfate  IVPB  potassium chloride    Tablet ER  pantoprazole    Tablet  levothyroxine  pantoprazole  Injectable  levothyroxine Injectable  fentaNYL   Infusion  norepinephrine Infusion  propofol Infusion  chlorhexidine 0.12% Liquid  potassium chloride  10 mEq/100 mL IVPB  potassium chloride    Tablet ER  insulin glargine Injectable (LANTUS)  insulin glargine Injectable (LANTUS)  DAPTOmycin IVPB  potassium chloride   Powder  HYDROmorphone  Injectable  oxyCODONE    IR  insulin lispro (ADMELOG) corrective regimen sliding scale  insulin glargine Injectable (LANTUS)  insulin lispro Injectable (ADMELOG)  HYDROmorphone  Injectable  vancomycin  IVPB  magnesium sulfate  IVPB  HYDROmorphone  Injectable  vancomycin  IVPB  magnesium sulfate  IVPB  HYDROmorphone  Injectable  oxyCODONE    IR  acetaminophen     Tablet ..  insulin lispro Injectable (ADMELOG)  insulin glargine Injectable (LANTUS)  oxyCODONE    IR  gabapentin  chlorhexidine 2% Cloths  sodium chloride 0.9% lock flush  acetaminophen     Tablet ..  HYDROmorphone  Injectable  magnesium sulfate  IVPB  insulin glargine Injectable (LANTUS)  insulin lispro Injectable (ADMELOG)  acetaminophen   IVPB ..  HYDROmorphone  Injectable  acetaminophen   IVPB ..  heparin   Injectable  HYDROmorphone  Injectable  lactated ringers.  lactated ringers.  potassium chloride    Tablet ER  spironolactone  chlorhexidine 2% Cloths  povidone iodine 5% Nasal Swab  lactated ringers.  vancomycin  IVPB  vancomycin  IVPB  insulin lispro Injectable (ADMELOG)  vancomycin  IVPB  vancomycin  IVPB  vancomycin  IVPB  enoxaparin Injectable  HYDROmorphone  Injectable  oxyCODONE    IR  oxyCODONE    IR  traMADol  oxyCODONE    IR  HYDROmorphone  Injectable  HYDROmorphone  Injectable  HYDROmorphone  Injectable  lactated ringers.  tobramycin Injectable  vancomycin  IVPB  cyclobenzaprine  acetaminophen     Tablet ..  oxyCODONE    IR  HYDROmorphone  Injectable  HYDROmorphone  Injectable  vancomycin  IVPB  vancomycin  IVPB  chlorhexidine 2% Cloths  povidone iodine 5% Nasal Swab  insulin glargine Injectable (LANTUS)  vancomycin  IVPB  glucagon  Injectable  dextrose 5%.  dextrose 50% Injectable  dextrose 50% Injectable  dextrose 50% Injectable  dextrose 5%.  dextrose 40% Gel  insulin lispro (ADMELOG) corrective regimen sliding scale  levothyroxine  pantoprazole    Tablet  furosemide    Tablet  ALPRAZolam  rifAMPin  atorvastatin  DULoxetine  aluminum hydroxide/magnesium hydroxide/simethicone Suspension  spironolactone  lactated ringers.  HYDROmorphone  Injectable  oxyCODONE    IR  oxyCODONE    IR    ROS: Const:  __N_febrile   Eyes:___ENT:___CV: _N__chest pain  Resp: _N___sob  GI:__N_nausea __N_vomiting _N___abd pain ___npo ___clears _Y__full diet __bm  :___ Musk: Y__pain ___spasm  Skin:___ Neuro:  __N_sedation_N__confusion___N_ numbness ___weakness __N_paresthesia  Psych:__N_anxiety  Endo:___ Heme:___Allergy:_carvedilol, enalapril, entresto, ACER inhibitors__      01-19 @ 08:0362 mL/min/1.73M2  Hemoglobin: 9.0 g/dL (01-19 @ 08:03)  Hemoglobin: 9.3 g/dL (01-18 @ 06:48)    T(C): 36.5 (01-19-22 @ 12:40), Max: 36.7 (01-19-22 @ 10:04)  HR: 79 (01-19-22 @ 12:40) (60 - 83)  BP: 140/74 (01-19-22 @ 12:40) (102/51 - 186/79)  RR: 18 (01-19-22 @ 12:40) (12 - 20)  SpO2: 100% (01-19-22 @ 12:40) (96% - 100%)  Wt(kg): --     PHYSICAL EXAM:  Gen Appearance: __X_no acute distress _X__appropriate       Neuro: ___SILT feet____ EOM Intact Psych: AAOX_3_, __X_mood/affect appropriate        Eyes: __X_conjunctiva WNL  ___X__ Pupils equal and round        ENT: _X__ears and nose atraumatic__X_ Hearing grossly intact        Neck: _X__trachea midline, no visible masses ___thyroid without palpable mass    Resp: __X_Nml WOB____No tactile fremitus ___clear to auscultation    Cardio: __X_extremities free from edema ____pedal pulses palpable    GI/Abdomen: ___soft _____ Nontender____X__Nondistended_____HSM    Lymphatic: ___no palpable nodes in neck  ___no palpable nodes calves and feet    Skin/Wound: ___Incision, __X_Dressing c/d/i,   ____surrounding tissues soft,  __X_drain/chest tube present____    Muscular: EHL ___/5  Gastrocnemius___/5    __X_absent clubbing/cyanosis         ASSESSMENT:  This is a 66y old Male with a history of diabetes, CHF, HLD, HTN, STEMI, s/p right knee explant/spacer exchange 0/2020 scheduled for repeat explant, spacer exchange and revision of gastric flap, now s/p right guillotine AKA, with right limb pain.    Recommended Treatment PLAN:  1. Consider splitting Percocet into separate Tylenol and Oxycodone to better manage each agent individually  2. Consider increasing to Oxycodone 5-10 mg PO q4h PRN moderate-severe pain  3. Consider starting Tylenol 975 mg PO TID  4. Consider increasing to Gabapentin 300 mg PO TID   5. Consider continuing Dilaudid 0.5 mg IVP q4h PRN breakthrough pain  Plan discussed with Dr. Max      Pain Management Progress Note - [facility] [phone]    HPI: Patient seen and examined today. 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 Patient with reports of right limb pain. Patient reports pain in the limb feels "burning" and sharp. Current hospital regimen includes Perccoet 5/325 mg 1 tab po q6h prn moderate pain, Percocet 5/325 mg 2tabs PO q4h PRN severe pain, Gabapentin 200 mg PO TID, and Dilaudid 0.5 mg IVP q4h PRN breakthrough pain. Patient reports pain improves moderately with PO Percocet, but still with significant pain. Patient denies numbness/tingling, patient denies side effects from current pain regimen.    Pertinent PMH: Pain at: ___Back ___Neck__X_Knee ___Hip ___Shoulder ___ Opioid tolerance    Pain is _X__ sharp ____dull ___burning ___achy ___ Intensity: ____ mild _X___mod _X___severe     Location __X___surgical site _____cervical _____lumbar ____abd _____upper ext___X_lower ext    Worse with __X__activity __X__movement _____physical therapy___ Rest    Improved with ___X_medication ___X_rest ____physical therapy    PMH:  Status post percutaneous transluminal coronary angioplasty  2 stents  Atherosclerosis of coronary artery  CAD (coronary artery disease)  Osteoarthritis  HLD (hyperlipidemia)  Blood clot due to device, implant, or graft  was on blood thinners  Diabetes  on insulin pump  HTN (hypertension)  CHF (congestive heart failure)  EF ~ 25%  History of celiac disease  Diverticulitis  STEMI (ST elevation myocardial infarction)  Diabetic neuropathy  Anxiety and depression  Other postprocedural status  Fixation hardware in foot LEFT  Stented coronary artery  10/18 heart attack  INFERIOR WALL MI  S/P CABG x 1  2018  S/P TKR (total knee replacement), right  with infection Mrsa   per pt he was cleared from MRSA infection  Surgery, elective  right knee wound debridement  History of open reduction and internal fixation (ORIF) procedure  H/O shoulder surgery  right  AICD (automatic cardioverter/defibrillator) present  Cholecystostomy care  drain inserted 2020 &amp; removed 4 months ago  History of tonsillectomy  History of hip replacement, total, right    Medications:  spironolactone  oxycodone    5 mG/acetaminophen 325 mG  HYDROmorphone  Injectable  oxycodone    5 mG/acetaminophen 325 mG  oxycodone    5 mG/acetaminophen 325 mG  HYDROmorphone  Injectable  insulin lispro Injectable (ADMELOG)  insulin glargine Injectable (LANTUS)  dextrose 50% Injectable  dextrose 50% Injectable  dextrose 5% + sodium chloride 0.45%.  HYDROmorphone  Injectable  insulin glargine Injectable (LANTUS)  simethicone  aspirin enteric coated  HYDROmorphone  Injectable  simethicone  magnesium oxide  HYDROmorphone  Injectable  acetaminophen     Tablet ..  oxycodone    5 mG/acetaminophen 325 mG  gabapentin  DULoxetine  insulin glargine Injectable (LANTUS)  acetaminophen   IVPB ..  HYDROmorphone  Injectable  melatonin  acetaminophen   IVPB ..  insulin glargine Injectable (LANTUS)  HYDROmorphone  Injectable  HYDROmorphone  Injectable  acetaminophen   IVPB ..  HYDROmorphone  Injectable  senna  insulin glargine Injectable (LANTUS)  magnesium sulfate  IVPB  potassium chloride    Tablet ER  pantoprazole    Tablet  levothyroxine  pantoprazole  Injectable  levothyroxine Injectable  fentaNYL   Infusion  norepinephrine Infusion  propofol Infusion  chlorhexidine 0.12% Liquid  potassium chloride  10 mEq/100 mL IVPB  potassium chloride    Tablet ER  insulin glargine Injectable (LANTUS)  insulin glargine Injectable (LANTUS)  DAPTOmycin IVPB  potassium chloride   Powder  HYDROmorphone  Injectable  oxyCODONE    IR  insulin lispro (ADMELOG) corrective regimen sliding scale  insulin glargine Injectable (LANTUS)  insulin lispro Injectable (ADMELOG)  HYDROmorphone  Injectable  vancomycin  IVPB  magnesium sulfate  IVPB  HYDROmorphone  Injectable  vancomycin  IVPB  magnesium sulfate  IVPB  HYDROmorphone  Injectable  oxyCODONE    IR  acetaminophen     Tablet ..  insulin lispro Injectable (ADMELOG)  insulin glargine Injectable (LANTUS)  oxyCODONE    IR  gabapentin  chlorhexidine 2% Cloths  sodium chloride 0.9% lock flush  acetaminophen     Tablet ..  HYDROmorphone  Injectable  magnesium sulfate  IVPB  insulin glargine Injectable (LANTUS)  insulin lispro Injectable (ADMELOG)  acetaminophen   IVPB ..  HYDROmorphone  Injectable  acetaminophen   IVPB ..  heparin   Injectable  HYDROmorphone  Injectable  lactated ringers.  lactated ringers.  potassium chloride    Tablet ER  spironolactone  chlorhexidine 2% Cloths  povidone iodine 5% Nasal Swab  lactated ringers.  vancomycin  IVPB  vancomycin  IVPB  insulin lispro Injectable (ADMELOG)  vancomycin  IVPB  vancomycin  IVPB  vancomycin  IVPB  enoxaparin Injectable  HYDROmorphone  Injectable  oxyCODONE    IR  oxyCODONE    IR  traMADol  oxyCODONE    IR  HYDROmorphone  Injectable  HYDROmorphone  Injectable  HYDROmorphone  Injectable  lactated ringers.  tobramycin Injectable  vancomycin  IVPB  cyclobenzaprine  acetaminophen     Tablet ..  oxyCODONE    IR  HYDROmorphone  Injectable  HYDROmorphone  Injectable  vancomycin  IVPB  vancomycin  IVPB  chlorhexidine 2% Cloths  povidone iodine 5% Nasal Swab  insulin glargine Injectable (LANTUS)  vancomycin  IVPB  glucagon  Injectable  dextrose 5%.  dextrose 50% Injectable  dextrose 50% Injectable  dextrose 50% Injectable  dextrose 5%.  dextrose 40% Gel  insulin lispro (ADMELOG) corrective regimen sliding scale  levothyroxine  pantoprazole    Tablet  furosemide    Tablet  ALPRAZolam  rifAMPin  atorvastatin  DULoxetine  aluminum hydroxide/magnesium hydroxide/simethicone Suspension  spironolactone  lactated ringers.  HYDROmorphone  Injectable  oxyCODONE    IR  oxyCODONE    IR    ROS: Const:  __N_febrile   Eyes:___ENT:___CV: _N__chest pain  Resp: _N___sob  GI:__N_nausea __N_vomiting _N___abd pain ___npo ___clears _Y__full diet __bm  :___ Musk: Y__pain ___spasm  Skin:___ Neuro:  __N_sedation_N__confusion___N_ numbness ___weakness __N_paresthesia  Psych:__N_anxiety  Endo:___ Heme:___Allergy:_carvedilol, enalapril, entresto, ACER inhibitors__      01-19 @ 08:0362 mL/min/1.73M2  Hemoglobin: 9.0 g/dL (01-19 @ 08:03)  Hemoglobin: 9.3 g/dL (01-18 @ 06:48)    T(C): 36.5 (01-19-22 @ 12:40), Max: 36.7 (01-19-22 @ 10:04)  HR: 79 (01-19-22 @ 12:40) (60 - 83)  BP: 140/74 (01-19-22 @ 12:40) (102/51 - 186/79)  RR: 18 (01-19-22 @ 12:40) (12 - 20)  SpO2: 100% (01-19-22 @ 12:40) (96% - 100%)  Wt(kg): --     PHYSICAL EXAM:  Gen Appearance: __X_no acute distress _X__appropriate       Neuro: ___SILT feet____ EOM Intact Psych: AAOX_3_, __X_mood/affect appropriate        Eyes: __X_conjunctiva WNL  ___X__ Pupils equal and round        ENT: _X__ears and nose atraumatic__X_ Hearing grossly intact        Neck: _X__trachea midline, no visible masses ___thyroid without palpable mass    Resp: __X_Nml WOB____No tactile fremitus ___clear to auscultation    Cardio: __X_extremities free from edema ____pedal pulses palpable    GI/Abdomen: ___soft _____ Nontender____X__Nondistended_____HSM    Lymphatic: ___no palpable nodes in neck  ___no palpable nodes calves and feet    Skin/Wound: ___Incision, __X_Dressing c/d/i,   ____surrounding tissues soft,  __X_drain/chest tube present____    Muscular: EHL ___/5  Gastrocnemius___/5    __X_absent clubbing/cyanosis         ASSESSMENT:  This is a 66y old Male with a history of diabetes, CHF, HLD, HTN, STEMI, s/p right knee explant/spacer exchange 0/2020 scheduled for repeat explant, spacer exchange and revision of gastric flap, now s/p right guillotine AKA, with right limb pain.    Recommended Treatment PLAN:  1. Consider splitting Percocet into separate Tylenol and Oxycodone to better manage each agent individually  2. Consider increasing to Oxycodone 5-10 mg PO q4h PRN moderate-severe pain  3. Consider starting Tylenol 975 mg PO TID  4. Consider continuing Gabapentin 200 mg PO TID   5. Consider continuing Dilaudid 0.5 mg IVP q4h PRN breakthrough pain  6. Consider starting Flexeril 5 mg PO q8h PRN muscle spasm  Plan discussed with Dr. Max

## 2022-01-19 NOTE — PROGRESS NOTE ADULT - SUBJECTIVE AND OBJECTIVE BOX
O/N: ASHLYN CHRISTIAN  1/18: OR for delayed primary closure of R AKA. POC WNL. Per Endo, lantus down to 28u at bedtime, increase lispro 12u pre-meal.            ---------------------------------------------------------------------------  PLEASE CHECK WHEN PRESENT:  [ x ] Heart Failure  [  ] Acute  [  ] Acute on Chronic  [x  ] Chronic  -------------------------------------------------------------------  [  ]Diastolic [HFpEF]  [x  ]Systolic [HFrEF]  [  ]Combined [HFpEF & HFrEF]  [  ] afib  [x  ] hypertensive heart disease  [  ]Other:  -------------------------------------------------------------------  [ ] Respiratory failure  [ ] Acute cor pulmonale  [ ] Asthma/COPD Exacerbation  [ ] Pleural effusion  [ ] Aspiration pneumonia  -------------------------------------------------------------------  [  ]MOISES  [  ]ATN  [  ]Reneal Medullary Necrosis  [  ]Renal Cortical Necrosis  [  ]Other Pathological Lesions:    [  ]CKD 1  [  ]CKD 2  [  ]CKD 3  [  ]CKD 4  [  ]CKD 5  [  ]Other  -------------------------------------------------------------------  [ x ]Diabetes  [  ] Diabetic PVD Ulcer  [  ] Neuropathic ulcer to DM  [  ] Diabetes with Nephropathy  [  ] Osteomyelitis due to diabetes  --------------------------------------------------------------------  [  ]Malnutrition: See Nutrition Note  [  ]Cachexia  [  ]Other:   [  ]Supplement Ordered:  [  ]Morbid Obesity (BMI >=40]  ---------------------------------------------------------------------  [ ] Sepsis/severe sepsis/septic shock  [ ] UTI  [ ] Pneumonia  -----------------------------------------------------------------------  [ ] Acidosis/alkalosis  [ ] Fluid overload  [ ] Hypokalemia  [ ] Hyperkalemia  [ ] Hypomagnesemia  [ ] Hypophosphatemia  [ ] Hyperphosphatemia  ------------------------------------------------------------------------  [ ] Acute blood loss anemia  [ ] Post op blood loss anemia  [ ] Iron deficiency anemia  [ ] Anemia due to chronic disease  [ ] Hypercoagulable state  ----------------------------------------------------------------------  [ ] Cerebral infarction  [ ] Transient ischemia attack  [ ] Encephalopathy        A/P: 66yMale Hx CAD, CAD s/p MIDCAB/VERONICA 2018, AICD (2/2020), CHF (EF 15-20%), DM, 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 He was taken to OR with ortho on Revision gastroc flap by PRS, antibiotic cement spacer removal, I&D, replacement on 1/6, then taken to OR with vascular on 1/10 for right AKA. taken to OR for completion AKA today, but hypotensive on induction, required pressors, procedure aborted, kept intubated and sent to SICU. Extubated in SICU on 1/14 and stepped down. 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   - abx duratiion 4 weeks    DM:  -ISS  -Lantus 28, lispro 12TID    Diet:   Consistent carb    Activity:   -PT/OT consult  - anticipate acute rehab placement when medically ready for discharge     DVTPPx: HSQ    Dispo:  pending closure of right above knee amputation today  O/N: ANAHI, VSS  1/18: OR for delayed primary closure of R AKA. POC WNL. Per Endo, lantus down to 28u at bedtime, increase lispro 12u pre-meal.    Subjective: no acute events, denies pain in right stump at rest. Denies CP, SOB     ROS:   Denies Headache, blurred vision, Chest Pain, SOB, Abdominal pain, nausea or vomiting     Social   DAPTOmycin IVPB 600  aspirin enteric coated 81  DAPTOmycin IVPB 600  heparin   Injectable 5000      Allergies    ACE inhibitors (Hives)  carvedilol (Other)  enalapril (Hives)  Entresto (Other)    Intolerances        Vital Signs Last 24 Hrs  T(C): 36.1 (19 Jan 2022 06:20), Max: 36.4 (18 Jan 2022 22:39)  T(F): 96.9 (19 Jan 2022 06:20), Max: 97.5 (18 Jan 2022 22:39)  HR: 80 (19 Jan 2022 05:37) (60 - 83)  BP: 150/73 (19 Jan 2022 05:37) (102/51 - 186/79)  BP(mean): 89 (18 Jan 2022 19:00) (73 - 92)  RR: 18 (19 Jan 2022 05:37) (12 - 20)  SpO2: 98% (19 Jan 2022 05:37) (96% - 100%)  I&O's Summary    18 Jan 2022 07:01  -  19 Jan 2022 07:00  --------------------------------------------------------  IN: 0 mL / OUT: 500 mL / NET: -500 mL        Physical Exam:  General: NAD  Pulmonary: b/l breath sounds   Cardiovascular: s1s2 Reg  Abdominal: soft   Extremities: Rt AKA stump dressed. dressing c/d/i       LABS:                        9.3    11.69 )-----------( 466      ( 18 Jan 2022 06:48 )             30.5     01-18    136  |  100  |  18  ----------------------------<  93  4.4   |  25  |  1.20    Ca    8.5      18 Jan 2022 06:48  Phos  3.3     01-18  Mg     2.0     01-18      PT/INR - ( 18 Jan 2022 06:48 )   PT: 14.4 sec;   INR: 1.21          PTT - ( 18 Jan 2022 06:48 )  PTT:27.1 sec    Radiology and Additional Studies:      ---------------------------------------------------------------------------  PLEASE CHECK WHEN PRESENT:  [ x ] Heart Failure  [  ] Acute  [  ] Acute on Chronic  [x  ] Chronic  -------------------------------------------------------------------  [  ]Diastolic [HFpEF]  [x  ]Systolic [HFrEF]  [  ]Combined [HFpEF & HFrEF]  [  ] afib  [x  ] hypertensive heart disease  [  ]Other:  -------------------------------------------------------------------  [ ] Respiratory failure  [ ] Acute cor pulmonale  [ ] Asthma/COPD Exacerbation  [ ] Pleural effusion  [ ] Aspiration pneumonia  -------------------------------------------------------------------  [  ]MOISES  [  ]ATN  [  ]Reneal Medullary Necrosis  [  ]Renal Cortical Necrosis  [  ]Other Pathological Lesions:    [  ]CKD 1  [  ]CKD 2  [  ]CKD 3  [  ]CKD 4  [  ]CKD 5  [  ]Other  -------------------------------------------------------------------  [ x ]Diabetes  [  ] Diabetic PVD Ulcer  [  ] Neuropathic ulcer to DM  [  ] Diabetes with Nephropathy  [  ] Osteomyelitis due to diabetes  --------------------------------------------------------------------  [  ]Malnutrition: See Nutrition Note  [  ]Cachexia  [  ]Other:   [  ]Supplement Ordered:  [  ]Morbid Obesity (BMI >=40]  ---------------------------------------------------------------------  [ ] Sepsis/severe sepsis/septic shock  [ ] UTI  [ ] Pneumonia  -----------------------------------------------------------------------  [ ] Acidosis/alkalosis  [ ] Fluid overload  [ ] Hypokalemia  [ ] Hyperkalemia  [ ] Hypomagnesemia  [ ] Hypophosphatemia  [ ] Hyperphosphatemia  ------------------------------------------------------------------------  [ ] Acute blood loss anemia  [ ] Post op blood loss anemia  [ ] Iron deficiency anemia  [ ] Anemia due to chronic disease  [ ] Hypercoagulable state  ----------------------------------------------------------------------  [ ] Cerebral infarction  [ ] Transient ischemia attack  [ ] Encephalopathy        A/P: 66yMale Hx CAD, CAD s/p MIDCAB/VERONICA 2018, AICD (2/2020), CHF (EF 15-20%), DM, 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 He was taken to OR with ortho on Revision gastroc flap by PRS, antibiotic cement spacer removal, I&D, replacement on 1/6, then taken to OR with vascular on 1/10 for right AKA. taken to OR for completion AKA today, but hypotensive on induction, required pressors, procedure aborted, kept intubated and sent to SICU. Extubated in SICU on 1/14 and stepped down. 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   - abx duratiion 4 weeks Dapto (EOT2/7)    DM:  -ISS  -Lantus 28, lispro 12TID  appreciate endo recs     Diet:   Consistent carb    Activity:   -PT/OT consult  - anticipate acute rehab placement when medically ready for discharge     DVTPPx: HSQ    Dispo: rehab placement pending PT recs

## 2022-01-19 NOTE — PROGRESS NOTE ADULT - SUBJECTIVE AND OBJECTIVE BOX
INTERVAL HPI/OVERNIGHT EVENTS:    Patient is a 66y old  Male who presents with a chief complaint of R Knee Pain (2022 08:50)  AKA closure . Had spasms overnight and couldn't sleep. Pain management has been consulted.    FSG & Insulin received:    Yesterday:  pre-dinner fs --> 138. 1 amp. Didn't eat much.  bedtime fs  lantus 28 units     Today:  pre-breakfast fs. Lispro 12. "Picked" at breakfast.  pre-lunch fs --> 80      Pt reports the following symptoms:    CONSTITUTIONAL:  Negative fever or chills  EYES:  Negative  blurry vision or double vision  CARDIOVASCULAR:  Negative for chest pain or palpitations  RESPIRATORY:  Negative for cough, wheezing, or SOB   GASTROINTESTINAL:  Negative for nausea, vomiting, diarrhea, constipation, or abdominal pain  GENITOURINARY:  Negative frequency, urgency or dysuria  NEUROLOGIC:  No headache, confusion, dizziness, lightheadedness    MEDICATIONS  (STANDING):  aspirin enteric coated 81 milliGRAM(s) Oral daily  atorvastatin 80 milliGRAM(s) Oral at bedtime  chlorhexidine 2% Cloths 1 Application(s) Topical <User Schedule>  DAPTOmycin IVPB 600 milliGRAM(s) IV Intermittent every 24 hours  dextrose 40% Gel 15 Gram(s) Oral once  dextrose 5%. 1000 milliLiter(s) (50 mL/Hr) IV Continuous <Continuous>  dextrose 5%. 1000 milliLiter(s) (100 mL/Hr) IV Continuous <Continuous>  dextrose 50% Injectable 25 Gram(s) IV Push once  dextrose 50% Injectable 12.5 Gram(s) IV Push once  dextrose 50% Injectable 25 Gram(s) IV Push once  DULoxetine 20 milliGRAM(s) Oral two times a day  gabapentin 200 milliGRAM(s) Oral three times a day  glucagon  Injectable 1 milliGRAM(s) IntraMuscular once  heparin   Injectable 5000 Unit(s) SubCutaneous every 8 hours  insulin glargine Injectable (LANTUS) 35 Unit(s) SubCutaneous at bedtime  insulin lispro (ADMELOG) corrective regimen sliding scale   SubCutaneous Before meals and at bedtime  insulin lispro Injectable (ADMELOG) 9 Unit(s) SubCutaneous three times a day before meals  levothyroxine 25 MICROGram(s) Oral daily  pantoprazole    Tablet 40 milliGRAM(s) Oral before breakfast  senna 2 Tablet(s) Oral at bedtime    MEDICATIONS  (PRN):  acetaminophen     Tablet .. 650 milliGRAM(s) Oral every 6 hours PRN Moderate Pain (4 - 6)  oxycodone    5 mG/acetaminophen 325 mG 1 Tablet(s) Oral every 6 hours PRN Severe Pain (7 - 10)  simethicone 80 milliGRAM(s) Chew once PRN Gas  sodium chloride 0.9% lock flush 10 milliLiter(s) IV Push every 1 hour PRN Pre/post blood products, medications, blood draw, and to maintain line patency      PHYSICAL EXAM  Vital Signs Last 24 Hrs  T(C): 35.9 (2022 14:45), Max: 36.8 (2022 18:56)  T(F): 96.7 (2022 14:45), Max: 98.3 (2022 18:56)  HR: 67 (2022 15:59) (60 - 91)  BP: 121/59 (2022 15:59) (102/51 - 129/67)  BP(mean): 85 (2022 15:59) (73 - 91)  RR: 13 (2022 15:59) (12 - 20)  SpO2: 100% (2022 15:59) (96% - 100%)    Constitutional: NAD.   HEENT: NCAT, MMM, OP clear, EOMI, , no proptosis or lid retraction  Neck: no thyromegaly or palpable thyroid nodules   Respiratory: lungs CTAB.  Cardiovascular: regular rhythm, normal S1 and S2, no audible murmurs, no peripheral edema  GI: soft, NT/ND, no masses/HSM appreciated.  Neurology: no tremors  Skin: no visible rashes/lesions, R AKA, dark rash on left shin, cool to touch   Psychiatric: AAO x 3, tired    LABS:                        9.3    11.69 )-----------( 466      ( 2022 06:48 )             30.5     01-18    136  |  100  |  18  ----------------------------<  93  4.4   |  25  |  1.20    Ca    8.5      2022 06:48  Phos  3.3     01-18  Mg     2.0           PT/INR - ( 2022 06:48 )   PT: 14.4 sec;   INR: 1.21          PTT - ( 2022 06:48 )  PTT:27.1 sec    Thyroid Stimulating Hormone, Serum: 2.850 uIU/mL ( @ 08:25)  Thyroid Stimulating Hormone, Serum: 1.68 uIU/mL ( @ 17:00)      HbA1C:   CAPILLARY BLOOD GLUCOSE      POCT Blood Glucose.: 138 mg/dL (2022 16:06)  POCT Blood Glucose.: 71 mg/dL (2022 15:14)  POCT Blood Glucose.: 80 mg/dL (2022 11:19)  POCT Blood Glucose.: 87 mg/dL (2022 07:05)  POCT Blood Glucose.: 270 mg/dL (2022 21:39)

## 2022-01-19 NOTE — PROGRESS NOTE ADULT - SUBJECTIVE AND OBJECTIVE BOX
Pain Management Progress Note - [facility] [phone]    HPI: Patient seen and examined today. 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 Patient with reports of right limb pain. Patient reports pain in the limb feels "burning" and sharp. Current hospital regimen includes Perccoet 5/325 mg 1 tab po q6h prn moderate pain, Percocet 5/325 mg 2tabs PO q4h PRN severe pain, Gabapentin 200 mg PO TID, and Dilaudid 0.5 mg IVP q4h PRN breakthrough pain. Patient reports pain improves moderately with PO Percocet, but still with significant pain. Patient denies numbness/tingling, patient denies side effects from current pain regimen.    Pertinent PMH: Pain at: ___Back ___Neck__X_Knee ___Hip ___Shoulder ___ Opioid tolerance    Pain is _X__ sharp ____dull ___burning ___achy ___ Intensity: ____ mild _X___mod _X___severe     Location __X___surgical site _____cervical _____lumbar ____abd _____upper ext___X_lower ext    Worse with __X__activity __X__movement _____physical therapy___ Rest    Improved with ___X_medication ___X_rest ____physical therapy    PMH:  Status post percutaneous transluminal coronary angioplasty  2 stents  Atherosclerosis of coronary artery  CAD (coronary artery disease)  Osteoarthritis  HLD (hyperlipidemia)  Blood clot due to device, implant, or graft  was on blood thinners  Diabetes  on insulin pump  HTN (hypertension)  CHF (congestive heart failure)  EF ~ 25%  History of celiac disease  Diverticulitis  STEMI (ST elevation myocardial infarction)  Diabetic neuropathy  Anxiety and depression  Other postprocedural status  Fixation hardware in foot LEFT  Stented coronary artery  10/18 heart attack  INFERIOR WALL MI  S/P CABG x 1  2018  S/P TKR (total knee replacement), right  with infection Mrsa   per pt he was cleared from MRSA infection  Surgery, elective  right knee wound debridement  History of open reduction and internal fixation (ORIF) procedure  H/O shoulder surgery  right  AICD (automatic cardioverter/defibrillator) present  Cholecystostomy care  drain inserted 2020 &amp; removed 4 months ago  History of tonsillectomy  History of hip replacement, total, right    Medications:  spironolactone  oxycodone    5 mG/acetaminophen 325 mG  HYDROmorphone  Injectable  oxycodone    5 mG/acetaminophen 325 mG  oxycodone    5 mG/acetaminophen 325 mG  HYDROmorphone  Injectable  insulin lispro Injectable (ADMELOG)  insulin glargine Injectable (LANTUS)  dextrose 50% Injectable  dextrose 50% Injectable  dextrose 5% + sodium chloride 0.45%.  HYDROmorphone  Injectable  insulin glargine Injectable (LANTUS)  simethicone  aspirin enteric coated  HYDROmorphone  Injectable  simethicone  magnesium oxide  HYDROmorphone  Injectable  acetaminophen     Tablet ..  oxycodone    5 mG/acetaminophen 325 mG  gabapentin  DULoxetine  insulin glargine Injectable (LANTUS)  acetaminophen   IVPB ..  HYDROmorphone  Injectable  melatonin  acetaminophen   IVPB ..  insulin glargine Injectable (LANTUS)  HYDROmorphone  Injectable  HYDROmorphone  Injectable  acetaminophen   IVPB ..  HYDROmorphone  Injectable  senna  insulin glargine Injectable (LANTUS)  magnesium sulfate  IVPB  potassium chloride    Tablet ER  pantoprazole    Tablet  levothyroxine  pantoprazole  Injectable  levothyroxine Injectable  fentaNYL   Infusion  norepinephrine Infusion  propofol Infusion  chlorhexidine 0.12% Liquid  potassium chloride  10 mEq/100 mL IVPB  potassium chloride    Tablet ER  insulin glargine Injectable (LANTUS)  insulin glargine Injectable (LANTUS)  DAPTOmycin IVPB  potassium chloride   Powder  HYDROmorphone  Injectable  oxyCODONE    IR  insulin lispro (ADMELOG) corrective regimen sliding scale  insulin glargine Injectable (LANTUS)  insulin lispro Injectable (ADMELOG)  HYDROmorphone  Injectable  vancomycin  IVPB  magnesium sulfate  IVPB  HYDROmorphone  Injectable  vancomycin  IVPB  magnesium sulfate  IVPB  HYDROmorphone  Injectable  oxyCODONE    IR  acetaminophen     Tablet ..  insulin lispro Injectable (ADMELOG)  insulin glargine Injectable (LANTUS)  oxyCODONE    IR  gabapentin  chlorhexidine 2% Cloths  sodium chloride 0.9% lock flush  acetaminophen     Tablet ..  HYDROmorphone  Injectable  magnesium sulfate  IVPB  insulin glargine Injectable (LANTUS)  insulin lispro Injectable (ADMELOG)  acetaminophen   IVPB ..  HYDROmorphone  Injectable  acetaminophen   IVPB ..  heparin   Injectable  HYDROmorphone  Injectable  lactated ringers.  lactated ringers.  potassium chloride    Tablet ER  spironolactone  chlorhexidine 2% Cloths  povidone iodine 5% Nasal Swab  lactated ringers.  vancomycin  IVPB  vancomycin  IVPB  insulin lispro Injectable (ADMELOG)  vancomycin  IVPB  vancomycin  IVPB  vancomycin  IVPB  enoxaparin Injectable  HYDROmorphone  Injectable  oxyCODONE    IR  oxyCODONE    IR  traMADol  oxyCODONE    IR  HYDROmorphone  Injectable  HYDROmorphone  Injectable  HYDROmorphone  Injectable  lactated ringers.  tobramycin Injectable  vancomycin  IVPB  cyclobenzaprine  acetaminophen     Tablet ..  oxyCODONE    IR  HYDROmorphone  Injectable  HYDROmorphone  Injectable  vancomycin  IVPB  vancomycin  IVPB  chlorhexidine 2% Cloths  povidone iodine 5% Nasal Swab  insulin glargine Injectable (LANTUS)  vancomycin  IVPB  glucagon  Injectable  dextrose 5%.  dextrose 50% Injectable  dextrose 50% Injectable  dextrose 50% Injectable  dextrose 5%.  dextrose 40% Gel  insulin lispro (ADMELOG) corrective regimen sliding scale  levothyroxine  pantoprazole    Tablet  furosemide    Tablet  ALPRAZolam  rifAMPin  atorvastatin  DULoxetine  aluminum hydroxide/magnesium hydroxide/simethicone Suspension  spironolactone  lactated ringers.  HYDROmorphone  Injectable  oxyCODONE    IR  oxyCODONE    IR    ROS: Const:  __N_febrile   Eyes:___ENT:___CV: _N__chest pain  Resp: _N___sob  GI:__N_nausea __N_vomiting _N___abd pain ___npo ___clears _Y__full diet __bm  :___ Musk: Y__pain ___spasm  Skin:___ Neuro:  __N_sedation_N__confusion___N_ numbness ___weakness __N_paresthesia  Psych:__N_anxiety  Endo:___ Heme:___Allergy:_carvedilol, enalapril, entresto, ACER inhibitors__      01-19 @ 08:0362 mL/min/1.73M2  Hemoglobin: 9.0 g/dL (01-19 @ 08:03)  Hemoglobin: 9.3 g/dL (01-18 @ 06:48)    T(C): 36.5 (01-19-22 @ 12:40), Max: 36.7 (01-19-22 @ 10:04)  HR: 79 (01-19-22 @ 12:40) (60 - 83)  BP: 140/74 (01-19-22 @ 12:40) (102/51 - 186/79)  RR: 18 (01-19-22 @ 12:40) (12 - 20)  SpO2: 100% (01-19-22 @ 12:40) (96% - 100%)  Wt(kg): --     PHYSICAL EXAM:  Gen Appearance: __X_no acute distress _X__appropriate       Neuro: ___SILT feet____ EOM Intact Psych: AAOX_3_, __X_mood/affect appropriate        Eyes: __X_conjunctiva WNL  ___X__ Pupils equal and round        ENT: _X__ears and nose atraumatic__X_ Hearing grossly intact        Neck: _X__trachea midline, no visible masses ___thyroid without palpable mass    Resp: __X_Nml WOB____No tactile fremitus ___clear to auscultation    Cardio: __X_extremities free from edema ____pedal pulses palpable    GI/Abdomen: ___soft _____ Nontender____X__Nondistended_____HSM    Lymphatic: ___no palpable nodes in neck  ___no palpable nodes calves and feet    Skin/Wound: ___Incision, __X_Dressing c/d/i,   ____surrounding tissues soft,  __X_drain/chest tube present____    Muscular: EHL ___/5  Gastrocnemius___/5    __X_absent clubbing/cyanosis         ASSESSMENT:  This is a 66y old Male with a history of diabetes, CHF, HLD, HTN, STEMI, s/p right knee explant/spacer exchange 0/2020 scheduled for repeat explant, spacer exchange and revision of gastric flap, now s/p right guillotine AKA, with right limb pain.    Recommended Treatment PLAN:  1. Consider splitting Percocet into separate Tylenol and Oxycodone to better manage each agent individually  2. Consider increasing to Oxycodone 5-10 mg PO q4h PRN moderate-severe pain  3. Consider starting Tylenol 975 mg PO TID  4. Consider continuing Gabapentin 200 mg PO TID   5. Consider continuing Dilaudid 0.5 mg IVP q4h PRN breakthrough pain  6. Consider starting Flexeril 5 mg PO q8h PRN muscle spasm  Plan discussed with Dr. Max        Pain Management Progress Note - [facility] [phone]    HPI: Patient seen and examined today. 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 Patient with reports of right limb pain. Patient reports pain in the limb feels "burning" and sharp. Current hospital regimen includes Perccoet 5/325 mg 1 tab po q6h prn moderate pain, Percocet 5/325 mg 2tabs PO q4h PRN severe pain, Gabapentin 200 mg PO TID, and Dilaudid 0.5 mg IVP q4h PRN breakthrough pain. Patient reports pain improves moderately with PO Percocet, but still with significant pain. Patient denies numbness/tingling, patient denies side effects from current pain regimen.    Pertinent PMH: Pain at: ___Back ___Neck__X_Knee ___Hip ___Shoulder ___ Opioid tolerance    Pain is _X__ sharp ____dull ___burning ___achy ___ Intensity: ____ mild _X___mod _X___severe     Location __X___surgical site _____cervical _____lumbar ____abd _____upper ext___X_lower ext    Worse with __X__activity __X__movement _____physical therapy___ Rest    Improved with ___X_medication ___X_rest ____physical therapy    PMH:  Status post percutaneous transluminal coronary angioplasty  2 stents  Atherosclerosis of coronary artery  CAD (coronary artery disease)  Osteoarthritis  HLD (hyperlipidemia)  Blood clot due to device, implant, or graft  was on blood thinners  Diabetes  on insulin pump  HTN (hypertension)  CHF (congestive heart failure)  EF ~ 25%  History of celiac disease  Diverticulitis  STEMI (ST elevation myocardial infarction)  Diabetic neuropathy  Anxiety and depression  Other postprocedural status  Fixation hardware in foot LEFT  Stented coronary artery  10/18 heart attack  INFERIOR WALL MI  S/P CABG x 1  2018  S/P TKR (total knee replacement), right  with infection Mrsa   per pt he was cleared from MRSA infection  Surgery, elective  right knee wound debridement  History of open reduction and internal fixation (ORIF) procedure  H/O shoulder surgery  right  AICD (automatic cardioverter/defibrillator) present  Cholecystostomy care  drain inserted 2020 &amp; removed 4 months ago  History of tonsillectomy  History of hip replacement, total, right    Medications:  spironolactone  oxycodone    5 mG/acetaminophen 325 mG  HYDROmorphone  Injectable  oxycodone    5 mG/acetaminophen 325 mG  oxycodone    5 mG/acetaminophen 325 mG  HYDROmorphone  Injectable  insulin lispro Injectable (ADMELOG)  insulin glargine Injectable (LANTUS)  dextrose 50% Injectable  dextrose 50% Injectable  dextrose 5% + sodium chloride 0.45%.  HYDROmorphone  Injectable  insulin glargine Injectable (LANTUS)  simethicone  aspirin enteric coated  HYDROmorphone  Injectable  simethicone  magnesium oxide  HYDROmorphone  Injectable  acetaminophen     Tablet ..  oxycodone    5 mG/acetaminophen 325 mG  gabapentin  DULoxetine  insulin glargine Injectable (LANTUS)  acetaminophen   IVPB ..  HYDROmorphone  Injectable  melatonin  acetaminophen   IVPB ..  insulin glargine Injectable (LANTUS)  HYDROmorphone  Injectable  HYDROmorphone  Injectable  acetaminophen   IVPB ..  HYDROmorphone  Injectable  senna  insulin glargine Injectable (LANTUS)  magnesium sulfate  IVPB  potassium chloride    Tablet ER  pantoprazole    Tablet  levothyroxine  pantoprazole  Injectable  levothyroxine Injectable  fentaNYL   Infusion  norepinephrine Infusion  propofol Infusion  chlorhexidine 0.12% Liquid  potassium chloride  10 mEq/100 mL IVPB  potassium chloride    Tablet ER  insulin glargine Injectable (LANTUS)  insulin glargine Injectable (LANTUS)  DAPTOmycin IVPB  potassium chloride   Powder  HYDROmorphone  Injectable  oxyCODONE    IR  insulin lispro (ADMELOG) corrective regimen sliding scale  insulin glargine Injectable (LANTUS)  insulin lispro Injectable (ADMELOG)  HYDROmorphone  Injectable  vancomycin  IVPB  magnesium sulfate  IVPB  HYDROmorphone  Injectable  vancomycin  IVPB  magnesium sulfate  IVPB  HYDROmorphone  Injectable  oxyCODONE    IR  acetaminophen     Tablet ..  insulin lispro Injectable (ADMELOG)  insulin glargine Injectable (LANTUS)  oxyCODONE    IR  gabapentin  chlorhexidine 2% Cloths  sodium chloride 0.9% lock flush  acetaminophen     Tablet ..  HYDROmorphone  Injectable  magnesium sulfate  IVPB  insulin glargine Injectable (LANTUS)  insulin lispro Injectable (ADMELOG)  acetaminophen   IVPB ..  HYDROmorphone  Injectable  acetaminophen   IVPB ..  heparin   Injectable  HYDROmorphone  Injectable  lactated ringers.  lactated ringers.  potassium chloride    Tablet ER  spironolactone  chlorhexidine 2% Cloths  povidone iodine 5% Nasal Swab  lactated ringers.  vancomycin  IVPB  vancomycin  IVPB  insulin lispro Injectable (ADMELOG)  vancomycin  IVPB  vancomycin  IVPB  vancomycin  IVPB  enoxaparin Injectable  HYDROmorphone  Injectable  oxyCODONE    IR  oxyCODONE    IR  traMADol  oxyCODONE    IR  HYDROmorphone  Injectable  HYDROmorphone  Injectable  HYDROmorphone  Injectable  lactated ringers.  tobramycin Injectable  vancomycin  IVPB  cyclobenzaprine  acetaminophen     Tablet ..  oxyCODONE    IR  HYDROmorphone  Injectable  HYDROmorphone  Injectable  vancomycin  IVPB  vancomycin  IVPB  chlorhexidine 2% Cloths  povidone iodine 5% Nasal Swab  insulin glargine Injectable (LANTUS)  vancomycin  IVPB  glucagon  Injectable  dextrose 5%.  dextrose 50% Injectable  dextrose 50% Injectable  dextrose 50% Injectable  dextrose 5%.  dextrose 40% Gel  insulin lispro (ADMELOG) corrective regimen sliding scale  levothyroxine  pantoprazole    Tablet  furosemide    Tablet  ALPRAZolam  rifAMPin  atorvastatin  DULoxetine  aluminum hydroxide/magnesium hydroxide/simethicone Suspension  spironolactone  lactated ringers.  HYDROmorphone  Injectable  oxyCODONE    IR  oxyCODONE    IR    ROS: Const:  __N_febrile   Eyes:___ENT:___CV: _N__chest pain  Resp: _N___sob  GI:__N_nausea __N_vomiting _N___abd pain ___npo ___clears _Y__full diet __bm  :___ Musk: Y__pain ___spasm  Skin:___ Neuro:  __N_sedation_N__confusion___N_ numbness ___weakness __N_paresthesia  Psych:__N_anxiety  Endo:___ Heme:___Allergy:_carvedilol, enalapril, entresto, ACER inhibitors__      01-19 @ 08:0362 mL/min/1.73M2  Hemoglobin: 9.0 g/dL (01-19 @ 08:03)  Hemoglobin: 9.3 g/dL (01-18 @ 06:48)    T(C): 36.5 (01-19-22 @ 12:40), Max: 36.7 (01-19-22 @ 10:04)  HR: 79 (01-19-22 @ 12:40) (60 - 83)  BP: 140/74 (01-19-22 @ 12:40) (102/51 - 186/79)  RR: 18 (01-19-22 @ 12:40) (12 - 20)  SpO2: 100% (01-19-22 @ 12:40) (96% - 100%)  Wt(kg): --     PHYSICAL EXAM:  Gen Appearance: __X_no acute distress _X__appropriate       Neuro: ___SILT feet____ EOM Intact Psych: AAOX_3_, __X_mood/affect appropriate        Eyes: __X_conjunctiva WNL  ___X__ Pupils equal and round        ENT: _X__ears and nose atraumatic__X_ Hearing grossly intact        Neck: _X__trachea midline, no visible masses ___thyroid without palpable mass    Resp: __X_Nml WOB____No tactile fremitus ___clear to auscultation    Cardio: __X_extremities free from edema ____pedal pulses palpable    GI/Abdomen: ___soft _____ Nontender____X__Nondistended_____HSM    Lymphatic: ___no palpable nodes in neck  ___no palpable nodes calves and feet    Skin/Wound: ___Incision, __X_Dressing c/d/i,   ____surrounding tissues soft,  __X_drain/chest tube present____    Muscular: EHL ___/5  Gastrocnemius___/5    __X_absent clubbing/cyanosis

## 2022-01-19 NOTE — PRE-ANESTHESIA EVALUATION ADULT - NS MD HP INPLANTS MED DEV
1. Done.  
Please see message below.  O.K. to refill Prednisone 20 mgm.  Prescription pending.  All other prescriptions refilled per protocol.    Last visit: 8/25/21  Next visit:  2/23/22    
Tiffanie Jackson, RN  [facility] Nurse Msg Galindo Quintero is switching all his medications and RXs to [facility]   Pt was using [facility] however has been having alot of issues and not sending him his medications   CAn you please fax these scripts to [facility] thank you    Azithromycin 500mg 1 tab mon, wed, friday   Daliresp 500mcg 1 tab daily   Prednisone 20mg daily   Albuterol inhaler   jennifer neb solution   trelegy 200mcg   acetylcysteine 3mls 2xdaily prn     Please call with any questions   Tiffanie Jackson RN BSN   RN [facility]   [phone]        
Automatic Implantable Cardioverter Defibrillator/Insulin pump

## 2022-01-19 NOTE — PROGRESS NOTE ADULT - ASSESSMENT
66M hx of CAD with CABG , CHF (EF 20-25%) with AICD, T2DM c/b neuropathy with hx of diabetic ulcer, HLD. HTN, COPD, diverticulitis, celiac disease, anxiety and depression, cholecystostomy, R hip replacement 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 Castro (explant and abx spacer exchange), transferred here from [facility] for recurrent PJI for repeat spacer exchange and possible flap coverage with Dr. Vaughn/Dr. Bowen. Pt has been experiencing atraumatic worsening R knee pain x 1 month. Notes he went to a procedure center where his knee was aspirated 3 weeks ago. Pt was seen at [facility] yesterday where his knee was aspirated with 283k cell count. WBC 14, ESR 78, +, AVSS. Pt started on vancomycin + rifampin. 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 Now s/p Replacement, antibiotic spacer 06-Jan-2022. Also with hyperglycemia.  now s/p R AKA on vascular service     A1C: 9.0%  Weight: 97kg BMI 28  Cr/GFR: 0.9/104  EF: 20-25%

## 2022-01-20 LAB
ANION GAP SERPL CALC-SCNC: 9 MMOL/L — SIGNIFICANT CHANGE UP (ref 5–17)
BUN SERPL-MCNC: 20 MG/DL — SIGNIFICANT CHANGE UP (ref 7–23)
CALCIUM SERPL-MCNC: 8.5 MG/DL — SIGNIFICANT CHANGE UP (ref 8.4–10.5)
CHLORIDE SERPL-SCNC: 102 MMOL/L — SIGNIFICANT CHANGE UP (ref 96–108)
CO2 SERPL-SCNC: 26 MMOL/L — SIGNIFICANT CHANGE UP (ref 22–31)
CREAT SERPL-MCNC: 1.39 MG/DL — HIGH (ref 0.5–1.3)
GLUCOSE BLDC GLUCOMTR-MCNC: 151 MG/DL — HIGH (ref 70–99)
GLUCOSE BLDC GLUCOMTR-MCNC: 170 MG/DL — HIGH (ref 70–99)
GLUCOSE BLDC GLUCOMTR-MCNC: 75 MG/DL — SIGNIFICANT CHANGE UP (ref 70–99)
GLUCOSE BLDC GLUCOMTR-MCNC: 84 MG/DL — SIGNIFICANT CHANGE UP (ref 70–99)
GLUCOSE SERPL-MCNC: 163 MG/DL — HIGH (ref 70–99)
HCT VFR BLD CALC: 28.4 % — LOW (ref 39–50)
HGB BLD-MCNC: 8.7 G/DL — LOW (ref 13–17)
MAGNESIUM SERPL-MCNC: 1.9 MG/DL — SIGNIFICANT CHANGE UP (ref 1.6–2.6)
MCHC RBC-ENTMCNC: 25.2 PG — LOW (ref 27–34)
MCHC RBC-ENTMCNC: 30.6 GM/DL — LOW (ref 32–36)
MCV RBC AUTO: 82.3 FL — SIGNIFICANT CHANGE UP (ref 80–100)
NRBC # BLD: 0 /100 WBCS — SIGNIFICANT CHANGE UP (ref 0–0)
PHOSPHATE SERPL-MCNC: 3.6 MG/DL — SIGNIFICANT CHANGE UP (ref 2.5–4.5)
PLATELET # BLD AUTO: 444 K/UL — HIGH (ref 150–400)
POTASSIUM SERPL-MCNC: 4.4 MMOL/L — SIGNIFICANT CHANGE UP (ref 3.5–5.3)
POTASSIUM SERPL-SCNC: 4.4 MMOL/L — SIGNIFICANT CHANGE UP (ref 3.5–5.3)
RBC # BLD: 3.45 M/UL — LOW (ref 4.2–5.8)
RBC # FLD: 18.3 % — HIGH (ref 10.3–14.5)
SODIUM SERPL-SCNC: 137 MMOL/L — SIGNIFICANT CHANGE UP (ref 135–145)
WBC # BLD: 8.89 K/UL — SIGNIFICANT CHANGE UP (ref 3.8–10.5)
WBC # FLD AUTO: 8.89 K/UL — SIGNIFICANT CHANGE UP (ref 3.8–10.5)

## 2022-01-20 PROCEDURE — 99233 SBSQ HOSP IP/OBS HIGH 50: CPT

## 2022-01-20 RX ADMIN — CHLORHEXIDINE GLUCONATE 1 APPLICATION(S): 213 SOLUTION TOPICAL at 05:11

## 2022-01-20 RX ADMIN — Medication 2: at 21:48

## 2022-01-20 RX ADMIN — DULOXETINE HYDROCHLORIDE 20 MILLIGRAM(S): 30 CAPSULE, DELAYED RELEASE ORAL at 21:48

## 2022-01-20 RX ADMIN — OXYCODONE HYDROCHLORIDE 10 MILLIGRAM(S): 5 TABLET ORAL at 04:00

## 2022-01-20 RX ADMIN — DAPTOMYCIN 124 MILLIGRAM(S): 500 INJECTION, POWDER, LYOPHILIZED, FOR SOLUTION INTRAVENOUS at 21:46

## 2022-01-20 RX ADMIN — Medication 975 MILLIGRAM(S): at 23:39

## 2022-01-20 RX ADMIN — SIMETHICONE 80 MILLIGRAM(S): 80 TABLET, CHEWABLE ORAL at 17:46

## 2022-01-20 RX ADMIN — HEPARIN SODIUM 5000 UNIT(S): 5000 INJECTION INTRAVENOUS; SUBCUTANEOUS at 21:48

## 2022-01-20 RX ADMIN — OXYCODONE HYDROCHLORIDE 10 MILLIGRAM(S): 5 TABLET ORAL at 04:30

## 2022-01-20 RX ADMIN — Medication 2: at 06:47

## 2022-01-20 RX ADMIN — OXYCODONE HYDROCHLORIDE 10 MILLIGRAM(S): 5 TABLET ORAL at 21:48

## 2022-01-20 RX ADMIN — Medication 25 MICROGRAM(S): at 05:25

## 2022-01-20 RX ADMIN — SPIRONOLACTONE 25 MILLIGRAM(S): 25 TABLET, FILM COATED ORAL at 05:24

## 2022-01-20 RX ADMIN — OXYCODONE HYDROCHLORIDE 10 MILLIGRAM(S): 5 TABLET ORAL at 18:46

## 2022-01-20 RX ADMIN — Medication 975 MILLIGRAM(S): at 06:03

## 2022-01-20 RX ADMIN — Medication 975 MILLIGRAM(S): at 21:47

## 2022-01-20 RX ADMIN — ATORVASTATIN CALCIUM 80 MILLIGRAM(S): 80 TABLET, FILM COATED ORAL at 21:47

## 2022-01-20 RX ADMIN — Medication 975 MILLIGRAM(S): at 13:23

## 2022-01-20 RX ADMIN — GABAPENTIN 300 MILLIGRAM(S): 400 CAPSULE ORAL at 13:24

## 2022-01-20 RX ADMIN — Medication 975 MILLIGRAM(S): at 05:24

## 2022-01-20 RX ADMIN — Medication 10 UNIT(S): at 09:54

## 2022-01-20 RX ADMIN — OXYCODONE HYDROCHLORIDE 10 MILLIGRAM(S): 5 TABLET ORAL at 10:55

## 2022-01-20 RX ADMIN — GABAPENTIN 300 MILLIGRAM(S): 400 CAPSULE ORAL at 05:25

## 2022-01-20 RX ADMIN — INSULIN GLARGINE 25 UNIT(S): 100 INJECTION, SOLUTION SUBCUTANEOUS at 21:57

## 2022-01-20 RX ADMIN — HEPARIN SODIUM 5000 UNIT(S): 5000 INJECTION INTRAVENOUS; SUBCUTANEOUS at 05:25

## 2022-01-20 RX ADMIN — Medication 975 MILLIGRAM(S): at 14:23

## 2022-01-20 RX ADMIN — OXYCODONE HYDROCHLORIDE 10 MILLIGRAM(S): 5 TABLET ORAL at 22:50

## 2022-01-20 RX ADMIN — OXYCODONE HYDROCHLORIDE 10 MILLIGRAM(S): 5 TABLET ORAL at 17:46

## 2022-01-20 RX ADMIN — Medication 81 MILLIGRAM(S): at 09:55

## 2022-01-20 RX ADMIN — GABAPENTIN 300 MILLIGRAM(S): 400 CAPSULE ORAL at 21:47

## 2022-01-20 RX ADMIN — HEPARIN SODIUM 5000 UNIT(S): 5000 INJECTION INTRAVENOUS; SUBCUTANEOUS at 13:24

## 2022-01-20 RX ADMIN — DULOXETINE HYDROCHLORIDE 20 MILLIGRAM(S): 30 CAPSULE, DELAYED RELEASE ORAL at 09:55

## 2022-01-20 RX ADMIN — OXYCODONE HYDROCHLORIDE 10 MILLIGRAM(S): 5 TABLET ORAL at 09:55

## 2022-01-20 RX ADMIN — PANTOPRAZOLE SODIUM 40 MILLIGRAM(S): 20 TABLET, DELAYED RELEASE ORAL at 05:26

## 2022-01-20 NOTE — PROGRESS NOTE ADULT - SUBJECTIVE AND OBJECTIVE BOX
INTERVAL HISTORY:  The patient has stump pain.  He denies chest pain dyspnea or dizziness.    MEDICATIONS:  MEDICATIONS  (STANDING):  acetaminophen     Tablet .. 975 milliGRAM(s) Oral every 8 hours  aspirin enteric coated 81 milliGRAM(s) Oral daily  atorvastatin 80 milliGRAM(s) Oral at bedtime  chlorhexidine 2% Cloths 1 Application(s) Topical <User Schedule>  DAPTOmycin IVPB 600 milliGRAM(s) IV Intermittent every 24 hours  dextrose 40% Gel 15 Gram(s) Oral once  dextrose 5%. 1000 milliLiter(s) (50 mL/Hr) IV Continuous <Continuous>  dextrose 5%. 1000 milliLiter(s) (100 mL/Hr) IV Continuous <Continuous>  dextrose 50% Injectable 25 Gram(s) IV Push once  dextrose 50% Injectable 12.5 Gram(s) IV Push once  dextrose 50% Injectable 25 Gram(s) IV Push once  DULoxetine 20 milliGRAM(s) Oral two times a day  gabapentin 300 milliGRAM(s) Oral three times a day  glucagon  Injectable 1 milliGRAM(s) IntraMuscular once  heparin   Injectable 5000 Unit(s) SubCutaneous every 8 hours  insulin glargine Injectable (LANTUS) 25 Unit(s) SubCutaneous at bedtime  insulin lispro (ADMELOG) corrective regimen sliding scale   SubCutaneous Before meals and at bedtime  insulin lispro Injectable (ADMELOG) 10 Unit(s) SubCutaneous three times a day before meals  levothyroxine 25 MICROGram(s) Oral daily  pantoprazole    Tablet 40 milliGRAM(s) Oral before breakfast  senna 2 Tablet(s) Oral at bedtime  spironolactone 25 milliGRAM(s) Oral daily      REVIEW OF SYSTEMS:  CONSTITUTIONAL: No fever, no weight loss, no fatigue  PULMONARY: No cough, no wheezing, chills no hemoptysis; No Shortness of Breath  CARDIOVASCULAR: No chest pain, no palpitations, no syncope, dizziness, no leg swelling  GASTROINTESTINAL: No abdominal pain. No nausea, no  vomiting, no hematemesis; No diarrhea, no constipation. No melena, no hematochezia.  GENITOURINARY: No dysuria, no frequency, no hematuria, no incontinence  SKIN: No itching, no burning, no rashes, no lesions     PHYSICAL EXAM:  T(C): 36.7 (01-20-22 @ 06:11), Max: 36.8 (01-19-22 @ 13:58)  HR: 85 (01-20-22 @ 04:00) (77 - 91)  BP: 110/55 (01-20-22 @ 04:00) (110/55 - 156/70)  RR: 17 (01-20-22 @ 04:00) (17 - 18)  SpO2: 95% (01-20-22 @ 04:00) (95% - 100%)  Wt(kg): --  I&O's Summary    19 Jan 2022 07:01  -  20 Jan 2022 07:00  --------------------------------------------------------  IN: 845 mL / OUT: 700 mL / NET: 145 mL        Appearance:  No deformities, normal appearance	  HEENT:   Normal oral mucosa, PERRL, EOMI	  Neck: No jvd , no masses  Lymphatic: No  lymphadenopathy  Pulmonary: Lungs clear to auscultation and percussion  Cardiovascular: Normal S1 S2, No JVD, No  murmur, No edema	  Abdomen:  Soft, Non-tender, + BS  Skin: No rashes, No ecchymoses	  Extremities:  No clubbing or cyanosis, R AKA  MSK: Normal range of motion, no joint swelling    LABS:		  CARDIAC MARKERS:                         8.7    8.89  )-----------( 444      ( 20 Jan 2022 07:44 )             28.4   01-20    137  |  102  |  20  ----------------------------<  163<H>  4.4   |  26  |  1.39<H>    Ca    8.5      20 Jan 2022 07:44  Phos  3.6     01-20  Mg     1.9     01-20      proBNP:  Lipid Profile:  HgA1c:  TSH:      Culture - Tissue with Gram Stain (collected 01-18-22 @ 16:05)  Source: .Tissue Right Femur  Gram Stain (01-18-22 @ 16:06):    Test cannot be performed on this type of specimen.  Preliminary Report (01-19-22 @ 10:03):    No growth to date      TELEMETRY: 	a sense v pace      QTC     ECG:  	   QTC         RADIOLOGY:   DIAGNOSTIC TESTING: [ ] Echocardiogram: [ ]  Catheterization: [ ] Stress Test:    PMH, medications, allergies Chart notes, vital signs, laboratory data, and radiology studies reviewed.    ASSESSMENT/PLAN: 	  Congestive heart failure–patient has severely reduced ejection fraction.  He is comfortable and euvolemic.  Restarted spironolactone.  Add metoprolol later.    Coronary artery disease–the patient is chest pain-free.  The patient had stent thrombosis.  The plan is long-term prasugrel.  This should be restarted when stable postoperatively.    Defibrillator-The rhythm has been stable.  The patient does not require monitoring.    Diabetes–follow sugars.    Septic arthritis–status post AKA.  Afebrile with mildly elevated white blood count.  On antibiotics.

## 2022-01-20 NOTE — PROGRESS NOTE ADULT - SUBJECTIVE AND OBJECTIVE BOX
Pain Management Progress Note - [facility] [phone]    HPI: Patient seen and examined today. Patient reports tolerable level of pain to the left stump today. Patient reports he would like to continue the current pain regimen the way it is as it was been helping him. Patient reports endorsing some spasms at the stump, patient encouraged to take Flexeril. Patient denies side effects from current pain regimen.    Pertinent PMH: Pain at: ___Back ___Neck___Knee ___Hip ___Shoulder ___ Opioid tolerance    Pain is X___ sharp ____dull ___burning ___achy ___ Intensity: ____ mild __X__mod ___X_severe     Location ____X_surgical site _____cervical __X___lumbar ____abd _____upper ext____lower ext    Worse with __X__activity __X__movement _____physical therapy___ Rest    Improved with __X__medication __X__rest ____physical therapy    PMH:  Status post percutaneous transluminal coronary angioplasty  2 stents  Atherosclerosis of coronary artery  CAD (coronary artery disease)  Osteoarthritis  HLD (hyperlipidemia)  Blood clot due to device, implant, or graft  was on blood thinners  Diabetes  on insulin pump  HTN (hypertension)  CHF (congestive heart failure)  EF ~ 25%  History of celiac disease  Diverticulitis  STEMI (ST elevation myocardial infarction)  Diabetic neuropathy  Anxiety and depression  Other postprocedural status  Fixation hardware in foot LEFT  Stented coronary artery  10/18 heart attack  INFERIOR WALL MI  S/P CABG x 1  2018  S/P TKR (total knee replacement), right  with infection Mrsa   per pt he was cleared from MRSA infection  Surgery, elective  right knee wound debridement  History of open reduction and internal fixation (ORIF) procedure  H/O shoulder surgery  right  AICD (automatic cardioverter/defibrillator) present  Cholecystostomy care  drain inserted 2020 &amp; removed 4 months ago  History of tonsillectomy  History of hip replacement, total, right    Medications:  gabapentin  insulin lispro Injectable (ADMELOG)  insulin glargine Injectable (LANTUS)  acetaminophen     Tablet ..  cyclobenzaprine  oxyCODONE    IR  oxyCODONE    IR  spironolactone  oxycodone    5 mG/acetaminophen 325 mG  HYDROmorphone  Injectable  oxycodone    5 mG/acetaminophen 325 mG  oxycodone    5 mG/acetaminophen 325 mG  HYDROmorphone  Injectable  insulin lispro Injectable (ADMELOG)  insulin glargine Injectable (LANTUS)  dextrose 50% Injectable  dextrose 50% Injectable  dextrose 5% + sodium chloride 0.45%.  HYDROmorphone  Injectable  insulin glargine Injectable (LANTUS)  simethicone  aspirin enteric coated  HYDROmorphone  Injectable  simethicone  magnesium oxide  HYDROmorphone  Injectable  acetaminophen     Tablet ..  oxycodone    5 mG/acetaminophen 325 mG  gabapentin  DULoxetine  insulin glargine Injectable (LANTUS)  acetaminophen   IVPB ..  HYDROmorphone  Injectable  melatonin  acetaminophen   IVPB ..  insulin glargine Injectable (LANTUS)  HYDROmorphone  Injectable  HYDROmorphone  Injectable  acetaminophen   IVPB ..  HYDROmorphone  Injectable  senna  insulin glargine Injectable (LANTUS)  magnesium sulfate  IVPB  potassium chloride    Tablet ER  pantoprazole    Tablet  levothyroxine  pantoprazole  Injectable  levothyroxine Injectable  fentaNYL   Infusion  norepinephrine Infusion  propofol Infusion  chlorhexidine 0.12% Liquid  potassium chloride  10 mEq/100 mL IVPB  potassium chloride    Tablet ER  insulin glargine Injectable (LANTUS)  insulin glargine Injectable (LANTUS)  DAPTOmycin IVPB  potassium chloride   Powder  HYDROmorphone  Injectable  oxyCODONE    IR  insulin lispro (ADMELOG) corrective regimen sliding scale  insulin glargine Injectable (LANTUS)  insulin lispro Injectable (ADMELOG)  HYDROmorphone  Injectable  vancomycin  IVPB  magnesium sulfate  IVPB  HYDROmorphone  Injectable  vancomycin  IVPB  magnesium sulfate  IVPB  HYDROmorphone  Injectable  oxyCODONE    IR  acetaminophen     Tablet ..  insulin lispro Injectable (ADMELOG)  insulin glargine Injectable (LANTUS)  oxyCODONE    IR  gabapentin  chlorhexidine 2% Cloths  sodium chloride 0.9% lock flush  acetaminophen     Tablet ..  HYDROmorphone  Injectable  magnesium sulfate  IVPB  insulin glargine Injectable (LANTUS)  insulin lispro Injectable (ADMELOG)  acetaminophen   IVPB ..  HYDROmorphone  Injectable  acetaminophen   IVPB ..  heparin   Injectable  HYDROmorphone  Injectable  lactated ringers.  lactated ringers.  potassium chloride    Tablet ER  spironolactone  chlorhexidine 2% Cloths  povidone iodine 5% Nasal Swab  lactated ringers.  vancomycin  IVPB  vancomycin  IVPB  insulin lispro Injectable (ADMELOG)  vancomycin  IVPB  vancomycin  IVPB  vancomycin  IVPB  enoxaparin Injectable  HYDROmorphone  Injectable  oxyCODONE    IR  oxyCODONE    IR  traMADol  oxyCODONE    IR  HYDROmorphone  Injectable  HYDROmorphone  Injectable  HYDROmorphone  Injectable  lactated ringers.  tobramycin Injectable  vancomycin  IVPB  cyclobenzaprine  acetaminophen     Tablet ..  oxyCODONE    IR  HYDROmorphone  Injectable  HYDROmorphone  Injectable  vancomycin  IVPB  vancomycin  IVPB  chlorhexidine 2% Cloths  povidone iodine 5% Nasal Swab  insulin glargine Injectable (LANTUS)  vancomycin  IVPB  glucagon  Injectable  dextrose 5%.  dextrose 50% Injectable  dextrose 50% Injectable  dextrose 50% Injectable  dextrose 5%.  dextrose 40% Gel  insulin lispro (ADMELOG) corrective regimen sliding scale  levothyroxine  pantoprazole    Tablet  furosemide    Tablet  ALPRAZolam  rifAMPin  atorvastatin  DULoxetine  aluminum hydroxide/magnesium hydroxide/simethicone Suspension  spironolactone  lactated ringers.  HYDROmorphone  Injectable  oxyCODONE    IR  oxyCODONE    IR    ROS: Const:  _N__febrile   Eyes:___ENT:___CV: _N__chest pain  Resp: ___N_sob  GI:__N_nausea N___vomiting _N___abd pain ___npo ___clears _Y__full diet __bm  :___ Musk: __Y_pain ___spasm  Skin:___ Neuro:  _N__sedation__N_confusion___N_ numbness _N__weakness _N__paresthesia  Psych:___anxiety  Endo:___ Heme:___Allergy:___carvedilol, enalapril, entresto, ACEi      01-20 @ 07:4453 mL/min/1.73M2<L>  Hemoglobin: 8.7 g/dL (01-20 @ 07:44)  Hemoglobin: 9.0 g/dL (01-19 @ 08:03)  T(C): 37 (01-20-22 @ 10:26), Max: 37 (01-20-22 @ 10:26)  HR: 74 (01-20-22 @ 10:55) (69 - 91)  BP: 119/57 (01-20-22 @ 10:55) (104/59 - 156/70)  RR: 16 (01-20-22 @ 08:53) (16 - 18)  SpO2: 97% (01-20-22 @ 08:53) (95% - 100%)  Wt(kg): --     PHYSICAL EXAM:  Gen Appearance: _X__no acute distress _X__appropriate       Neuro: ___SILT feet____ EOM Intact Psych: AAOX3__, __X_mood/affect appropriate        Eyes: _X__conjunctiva WNL  ___X__ Pupils equal and round        ENT: _X__ears and nose atraumatic__X_ Hearing grossly intact        Neck: _X__trachea midline, no visible masses ___thyroid without palpable mass    Resp: _X__Nml WOB____No tactile fremitus ___clear to auscultation    Cardio: __X_extremities free from edema ____pedal pulses palpable    GI/Abdomen: ___soft _____ Nontender____X__Nondistended_____HSM    Lymphatic: ___no palpable nodes in neck  ___no palpable nodes calves and feet    Skin/Wound: ___Incision, _X__Dressing c/d/i,   ____surrounding tissues soft,  ___drain/chest tube present____    Muscular: EHL ___/5  Gastrocnemius___/5    _X__absent clubbing/cyanosis         ASSESSMENT:  This is a 66y old Male with a history of diabetes, CHF, HLD, HTN, STEMI, s/p right knee explant/spacer exchange 0/2020 scheduled for repeat explant, spacer exchange and revision of gastric flap, now s/p right guillotine AKA, with right limb pain moderately improved.     Recommended Treatment PLAN:  1. Consider Oxycodone 5-10 mg PO q4h PRN moderate-severe pain  2. Consider continuing Tylenol 975 mg PO TID  3. Consider continuing Flexeril 5mg PO q8h PRN muscle spasm  4. Consider continuing Dilaudid 0.5 mg IVP q4h PRN breakthrough pain  5. Consider continuing Gabapentin 300 mg PO TID  Plan discussed with Dr. Max, pt seen and examined by Dr. Max at PM rounds     Pain Management Progress Note - [facility] [phone]    HPI: Patient seen and examined today. Patient reports tolerable level of pain to the left stump today. Patient reports he would like to continue the current pain regimen the way it is as it was been helping him. Patient reports endorsing some spasms at the stump, patient encouraged to take Flexeril. Patient denies side effects from current pain regimen.    Pertinent PMH: Pain at: ___Back ___Neck___Knee ___Hip ___Shoulder ___ Opioid tolerance    Pain is X___ sharp ____dull ___burning ___achy ___ Intensity: ____ mild __X__mod ___X_severe     Location ____X_surgical site _____cervical __X___lumbar ____abd _____upper ext____lower ext    Worse with __X__activity __X__movement _____physical therapy___ Rest    Improved with __X__medication __X__rest ____physical therapy    PMH:  Status post percutaneous transluminal coronary angioplasty  2 stents  Atherosclerosis of coronary artery  CAD (coronary artery disease)  Osteoarthritis  HLD (hyperlipidemia)  Blood clot due to device, implant, or graft  was on blood thinners  Diabetes  on insulin pump  HTN (hypertension)  CHF (congestive heart failure)  EF ~ 25%  History of celiac disease  Diverticulitis  STEMI (ST elevation myocardial infarction)  Diabetic neuropathy  Anxiety and depression  Other postprocedural status  Fixation hardware in foot LEFT  Stented coronary artery  10/18 heart attack  INFERIOR WALL MI  S/P CABG x 1  2018  S/P TKR (total knee replacement), right  with infection Mrsa   per pt he was cleared from MRSA infection  Surgery, elective  right knee wound debridement  History of open reduction and internal fixation (ORIF) procedure  H/O shoulder surgery  right  AICD (automatic cardioverter/defibrillator) present  Cholecystostomy care  drain inserted 2020 &amp; removed 4 months ago  History of tonsillectomy  History of hip replacement, total, right    Medications:  gabapentin  insulin lispro Injectable (ADMELOG)  insulin glargine Injectable (LANTUS)  acetaminophen     Tablet ..  cyclobenzaprine  oxyCODONE    IR  oxyCODONE    IR  spironolactone  oxycodone    5 mG/acetaminophen 325 mG  HYDROmorphone  Injectable  oxycodone    5 mG/acetaminophen 325 mG  oxycodone    5 mG/acetaminophen 325 mG  HYDROmorphone  Injectable  insulin lispro Injectable (ADMELOG)  insulin glargine Injectable (LANTUS)  dextrose 50% Injectable  dextrose 50% Injectable  dextrose 5% + sodium chloride 0.45%.  HYDROmorphone  Injectable  insulin glargine Injectable (LANTUS)  simethicone  aspirin enteric coated  HYDROmorphone  Injectable  simethicone  magnesium oxide  HYDROmorphone  Injectable  acetaminophen     Tablet ..  oxycodone    5 mG/acetaminophen 325 mG  gabapentin  DULoxetine  insulin glargine Injectable (LANTUS)  acetaminophen   IVPB ..  HYDROmorphone  Injectable  melatonin  acetaminophen   IVPB ..  insulin glargine Injectable (LANTUS)  HYDROmorphone  Injectable  HYDROmorphone  Injectable  acetaminophen   IVPB ..  HYDROmorphone  Injectable  senna  insulin glargine Injectable (LANTUS)  magnesium sulfate  IVPB  potassium chloride    Tablet ER  pantoprazole    Tablet  levothyroxine  pantoprazole  Injectable  levothyroxine Injectable  fentaNYL   Infusion  norepinephrine Infusion  propofol Infusion  chlorhexidine 0.12% Liquid  potassium chloride  10 mEq/100 mL IVPB  potassium chloride    Tablet ER  insulin glargine Injectable (LANTUS)  insulin glargine Injectable (LANTUS)  DAPTOmycin IVPB  potassium chloride   Powder  HYDROmorphone  Injectable  oxyCODONE    IR  insulin lispro (ADMELOG) corrective regimen sliding scale  insulin glargine Injectable (LANTUS)  insulin lispro Injectable (ADMELOG)  HYDROmorphone  Injectable  vancomycin  IVPB  magnesium sulfate  IVPB  HYDROmorphone  Injectable  vancomycin  IVPB  magnesium sulfate  IVPB  HYDROmorphone  Injectable  oxyCODONE    IR  acetaminophen     Tablet ..  insulin lispro Injectable (ADMELOG)  insulin glargine Injectable (LANTUS)  oxyCODONE    IR  gabapentin  chlorhexidine 2% Cloths  sodium chloride 0.9% lock flush  acetaminophen     Tablet ..  HYDROmorphone  Injectable  magnesium sulfate  IVPB  insulin glargine Injectable (LANTUS)  insulin lispro Injectable (ADMELOG)  acetaminophen   IVPB ..  HYDROmorphone  Injectable  acetaminophen   IVPB ..  heparin   Injectable  HYDROmorphone  Injectable  lactated ringers.  lactated ringers.  potassium chloride    Tablet ER  spironolactone  chlorhexidine 2% Cloths  povidone iodine 5% Nasal Swab  lactated ringers.  vancomycin  IVPB  vancomycin  IVPB  insulin lispro Injectable (ADMELOG)  vancomycin  IVPB  vancomycin  IVPB  vancomycin  IVPB  enoxaparin Injectable  HYDROmorphone  Injectable  oxyCODONE    IR  oxyCODONE    IR  traMADol  oxyCODONE    IR  HYDROmorphone  Injectable  HYDROmorphone  Injectable  HYDROmorphone  Injectable  lactated ringers.  tobramycin Injectable  vancomycin  IVPB  cyclobenzaprine  acetaminophen     Tablet ..  oxyCODONE    IR  HYDROmorphone  Injectable  HYDROmorphone  Injectable  vancomycin  IVPB  vancomycin  IVPB  chlorhexidine 2% Cloths  povidone iodine 5% Nasal Swab  insulin glargine Injectable (LANTUS)  vancomycin  IVPB  glucagon  Injectable  dextrose 5%.  dextrose 50% Injectable  dextrose 50% Injectable  dextrose 50% Injectable  dextrose 5%.  dextrose 40% Gel  insulin lispro (ADMELOG) corrective regimen sliding scale  levothyroxine  pantoprazole    Tablet  furosemide    Tablet  ALPRAZolam  rifAMPin  atorvastatin  DULoxetine  aluminum hydroxide/magnesium hydroxide/simethicone Suspension  spironolactone  lactated ringers.  HYDROmorphone  Injectable  oxyCODONE    IR  oxyCODONE    IR    ROS: Const:  _N__febrile   Eyes:___ENT:___CV: _N__chest pain  Resp: ___N_sob  GI:__N_nausea N___vomiting _N___abd pain ___npo ___clears _Y__full diet __bm  :___ Musk: __Y_pain ___spasm  Skin:___ Neuro:  _N__sedation__N_confusion___N_ numbness _N__weakness _N__paresthesia  Psych:___anxiety  Endo:___ Heme:___Allergy:___carvedilol, enalapril, entresto, ACEi      01-20 @ 07:4453 mL/min/1.73M2<L>  Hemoglobin: 8.7 g/dL (01-20 @ 07:44)  Hemoglobin: 9.0 g/dL (01-19 @ 08:03)  T(C): 37 (01-20-22 @ 10:26), Max: 37 (01-20-22 @ 10:26)  HR: 74 (01-20-22 @ 10:55) (69 - 91)  BP: 119/57 (01-20-22 @ 10:55) (104/59 - 156/70)  RR: 16 (01-20-22 @ 08:53) (16 - 18)  SpO2: 97% (01-20-22 @ 08:53) (95% - 100%)  Wt(kg): --     PHYSICAL EXAM:  Gen Appearance: _X__no acute distress _X__appropriate       Neuro: ___SILT feet____ EOM Intact Psych: AAOX3__, __X_mood/affect appropriate        Eyes: _X__conjunctiva WNL  ___X__ Pupils equal and round        ENT: _X__ears and nose atraumatic__X_ Hearing grossly intact        Neck: _X__trachea midline, no visible masses ___thyroid without palpable mass    Resp: _X__Nml WOB____No tactile fremitus ___clear to auscultation    Cardio: __X_extremities free from edema ____pedal pulses palpable    GI/Abdomen: ___soft _____ Nontender____X__Nondistended_____HSM    Lymphatic: ___no palpable nodes in neck  ___no palpable nodes calves and feet    Skin/Wound: ___Incision, _X__Dressing c/d/i,   ____surrounding tissues soft,  ___drain/chest tube present____    Muscular: EHL ___/5  Gastrocnemius___/5    _X__absent clubbing/cyanosis         ASSESSMENT:  This is a 66y old Male with a history of diabetes, CHF, HLD, HTN, STEMI, s/p right knee explant/spacer exchange 0/2020 scheduled for repeat explant, spacer exchange and revision of gastric flap, now s/p right guillotine AKA, with right limb pain moderately improved.     Recommended Treatment PLAN:  1. Consider Oxycodone 5-10 mg PO q4h PRN moderate-severe pain  2. Consider continuing Tylenol 975 mg PO TID  3. Consider continuing Flexeril 5mg PO q8h PRN muscle spasm  4. Consider continuing Dilaudid 0.5 mg IVP q4h PRN breakthrough pain  5. Consider continuing Gabapentin 300 mg PO TID  Pt seen and examined by me, np acted as scribe

## 2022-01-20 NOTE — PROGRESS NOTE ADULT - SUBJECTIVE AND OBJECTIVE BOX
O/N: ASHLYN CHRISTIAN  1/19: Restarted Spironolactone per Cards, FS 47 treated, per endo decrease Lantus to 25/ lispro 10, seen by pain management         ---------------------------------------------------------------------------  PLEASE CHECK WHEN PRESENT:  [ x ] Heart Failure  [  ] Acute  [  ] Acute on Chronic  [x  ] Chronic  -------------------------------------------------------------------  [  ]Diastolic [HFpEF]  [x  ]Systolic [HFrEF]  [  ]Combined [HFpEF & HFrEF]  [  ] afib  [x  ] hypertensive heart disease  [  ]Other:  -------------------------------------------------------------------  [ ] Respiratory failure  [ ] Acute cor pulmonale  [ ] Asthma/COPD Exacerbation  [ ] Pleural effusion  [ ] Aspiration pneumonia  -------------------------------------------------------------------  [  ]MOISES  [  ]ATN  [  ]Reneal Medullary Necrosis  [  ]Renal Cortical Necrosis  [  ]Other Pathological Lesions:    [  ]CKD 1  [  ]CKD 2  [  ]CKD 3  [  ]CKD 4  [  ]CKD 5  [  ]Other  -------------------------------------------------------------------  [ x ]Diabetes  [  ] Diabetic PVD Ulcer  [  ] Neuropathic ulcer to DM  [  ] Diabetes with Nephropathy  [  ] Osteomyelitis due to diabetes  --------------------------------------------------------------------  [  ]Malnutrition: See Nutrition Note  [  ]Cachexia  [  ]Other:   [  ]Supplement Ordered:  [  ]Morbid Obesity (BMI >=40]  ---------------------------------------------------------------------  [ ] Sepsis/severe sepsis/septic shock  [ ] UTI  [ ] Pneumonia  -----------------------------------------------------------------------  [ ] Acidosis/alkalosis  [ ] Fluid overload  [ ] Hypokalemia  [ ] Hyperkalemia  [ ] Hypomagnesemia  [ ] Hypophosphatemia  [ ] Hyperphosphatemia  ------------------------------------------------------------------------  [ ] Acute blood loss anemia  [ ] Post op blood loss anemia  [ ] Iron deficiency anemia  [ ] Anemia due to chronic disease  [ ] Hypercoagulable state  ----------------------------------------------------------------------  [ ] Cerebral infarction  [ ] Transient ischemia attack  [ ] Encephalopathy        A/P: 66yMale Hx CAD, CAD s/p MIDCAB/VERONICA 2018, AICD (2/2020), CHF (EF 15-20%), DM, 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 He was taken to OR with ortho on Revision gastroc flap by PRS, antibiotic cement spacer removal, I&D, replacement on 1/6, then taken to OR with vascular on 1/10 for right AKA. taken to OR for completion AKA today, but hypotensive on induction, required pressors, procedure aborted, kept intubated and sent to SICU. Extubated in SICU on 1/14 and stepped down. 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   - abx duration 4 weeks Dapto (EOT2/7)    DM:  -ISS  -Lantus 25, lispro 10TID  appreciate endo recs     Diet:   Consistent carb    Activity:   -PT/OT consult  - anticipate acute rehab placement when medically ready for discharge     DVTPPx: HSQ    Dispo:  pending Acute rehab placement  O/N: ANAHI, VSS  1/19: Restarted Spironolactone per Cards, FS 47 treated, per endo decrease Lantus to 25/ lispro 10, seen by pain management       SUBJECTIVE: Patient seen at bedside, complaining of some stump pain. No acute distress. No CP, SOB, fevers or chills    Vital Signs Last 24 Hrs  T(C): 36.7 (20 Jan 2022 06:11), Max: 36.8 (19 Jan 2022 13:58)  T(F): 98 (20 Jan 2022 06:11), Max: 98.2 (19 Jan 2022 13:58)  HR: 85 (20 Jan 2022 04:00) (77 - 91)  BP: 110/55 (20 Jan 2022 04:00) (110/55 - 156/70)  BP(mean): --  RR: 17 (20 Jan 2022 04:00) (17 - 18)  SpO2: 95% (20 Jan 2022 04:00) (95% - 100%)    Physical Exam:  Gen: NAD, resting comfortably in bed  C/V: NSR  Pulm: Nonlabored breathing, no respiratory distress  Abd: soft, NT/ND  Extrem: RLE AKA  wound clean, dry and intact. Wrapped with, Xeroform, gauze, kerlix and ace wraps     Lines/drains/tubes:    I&O's Summary    18 Jan 2022 07:01  -  19 Jan 2022 07:00  --------------------------------------------------------  IN: 0 mL / OUT: 500 mL / NET: -500 mL    19 Jan 2022 07:01  -  20 Jan 2022 06:54  --------------------------------------------------------  IN: 845 mL / OUT: 500 mL / NET: 345 mL        LABS:                        9.0    11.30 )-----------( 492      ( 19 Jan 2022 08:03 )             30.1     01-19    134<L>  |  99  |  18  ----------------------------<  104<H>  4.7   |  25  |  1.23    Ca    8.5      19 Jan 2022 08:03  Phos  4.5     01-19  Mg     1.9     01-19          CAPILLARY BLOOD GLUCOSE      POCT Blood Glucose.: 151 mg/dL (20 Jan 2022 06:39)  POCT Blood Glucose.: 235 mg/dL (19 Jan 2022 21:50)  POCT Blood Glucose.: 142 mg/dL (19 Jan 2022 16:52)  POCT Blood Glucose.: 80 mg/dL (19 Jan 2022 12:39)  POCT Blood Glucose.: 47 mg/dL (19 Jan 2022 12:06)  POCT Blood Glucose.: 54 mg/dL (19 Jan 2022 12:05)        RADIOLOGY & ADDITIONAL STUDIES:        ---------------------------------------------------------------------------  PLEASE CHECK WHEN PRESENT:  [ x ] Heart Failure  [  ] Acute  [  ] Acute on Chronic  [x  ] Chronic  -------------------------------------------------------------------  [  ]Diastolic [HFpEF]  [x  ]Systolic [HFrEF]  [  ]Combined [HFpEF & HFrEF]  [  ] afib  [x  ] hypertensive heart disease  [  ]Other:  -------------------------------------------------------------------  [ ] Respiratory failure  [ ] Acute cor pulmonale  [ ] Asthma/COPD Exacerbation  [ ] Pleural effusion  [ ] Aspiration pneumonia  -------------------------------------------------------------------  [  ]MOISES  [  ]ATN  [  ]Reneal Medullary Necrosis  [  ]Renal Cortical Necrosis  [  ]Other Pathological Lesions:    [  ]CKD 1  [  ]CKD 2  [  ]CKD 3  [  ]CKD 4  [  ]CKD 5  [  ]Other  -------------------------------------------------------------------  [ x ]Diabetes  [  ] Diabetic PVD Ulcer  [  ] Neuropathic ulcer to DM  [  ] Diabetes with Nephropathy  [  ] Osteomyelitis due to diabetes  --------------------------------------------------------------------  [  ]Malnutrition: See Nutrition Note  [  ]Cachexia  [  ]Other:   [  ]Supplement Ordered:  [  ]Morbid Obesity (BMI >=40]  ---------------------------------------------------------------------  [ ] Sepsis/severe sepsis/septic shock  [ ] UTI  [ ] Pneumonia  -----------------------------------------------------------------------  [ ] Acidosis/alkalosis  [ ] Fluid overload  [ ] Hypokalemia  [ ] Hyperkalemia  [ ] Hypomagnesemia  [ ] Hypophosphatemia  [ ] Hyperphosphatemia  ------------------------------------------------------------------------  [ ] Acute blood loss anemia  [ ] Post op blood loss anemia  [ ] Iron deficiency anemia  [ ] Anemia due to chronic disease  [ ] Hypercoagulable state  ----------------------------------------------------------------------  [ ] Cerebral infarction  [ ] Transient ischemia attack  [ ] Encephalopathy        A/P: 66yMale Hx CAD, CAD s/p MIDCAB/VERONICA 2018, AICD (2/2020), CHF (EF 15-20%), DM, 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 Castro (explant and abx spacer exchange), transferred here on 1/6 from [facility] for MRSA bacteremia due to recurrent PJI. He was taken to OR with ortho on Revision gastroc flap by PRS, antibiotic cement spacer removal, I&D, replacement on 1/6, then taken to OR with vascular on 1/10 for right AKA. taken to OR for completion AKA today, but hypotensive on induction, required pressors, procedure aborted, kept intubated and sent to SICU. Extubated in SICU on 1/14 and stepped down. 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   - abx duration 4 weeks Dapto (EOT2/7)    DM:  -ISS  -Lantus 25, lispro 10TID  appreciate endo recs     Diet:   Consistent carb    Activity:   -PT/OT consult  - anticipate acute rehab placement when medically ready for discharge     DVTPPx: HSQ    Dispo:  pending Acute rehab placement

## 2022-01-21 ENCOUNTER — APPOINTMENT (OUTPATIENT)
Dept: NEUROLOGY | Facility: CLINIC | Age: 67
End: 2022-01-21

## 2022-01-21 LAB
ANION GAP SERPL CALC-SCNC: 7 MMOL/L — SIGNIFICANT CHANGE UP (ref 5–17)
BUN SERPL-MCNC: 17 MG/DL — SIGNIFICANT CHANGE UP (ref 7–23)
CALCIUM SERPL-MCNC: 8.8 MG/DL — SIGNIFICANT CHANGE UP (ref 8.4–10.5)
CHLORIDE SERPL-SCNC: 101 MMOL/L — SIGNIFICANT CHANGE UP (ref 96–108)
CO2 SERPL-SCNC: 27 MMOL/L — SIGNIFICANT CHANGE UP (ref 22–31)
CREAT SERPL-MCNC: 1.32 MG/DL — HIGH (ref 0.5–1.3)
GLUCOSE BLDC GLUCOMTR-MCNC: 148 MG/DL — HIGH (ref 70–99)
GLUCOSE BLDC GLUCOMTR-MCNC: 78 MG/DL — SIGNIFICANT CHANGE UP (ref 70–99)
GLUCOSE BLDC GLUCOMTR-MCNC: 82 MG/DL — SIGNIFICANT CHANGE UP (ref 70–99)
GLUCOSE BLDC GLUCOMTR-MCNC: 91 MG/DL — SIGNIFICANT CHANGE UP (ref 70–99)
GLUCOSE BLDC GLUCOMTR-MCNC: 98 MG/DL — SIGNIFICANT CHANGE UP (ref 70–99)
GLUCOSE SERPL-MCNC: 146 MG/DL — HIGH (ref 70–99)
HCT VFR BLD CALC: 32 % — LOW (ref 39–50)
HGB BLD-MCNC: 9.3 G/DL — LOW (ref 13–17)
MAGNESIUM SERPL-MCNC: 2 MG/DL — SIGNIFICANT CHANGE UP (ref 1.6–2.6)
MCHC RBC-ENTMCNC: 24.6 PG — LOW (ref 27–34)
MCHC RBC-ENTMCNC: 29.1 GM/DL — LOW (ref 32–36)
MCV RBC AUTO: 84.7 FL — SIGNIFICANT CHANGE UP (ref 80–100)
NRBC # BLD: 0 /100 WBCS — SIGNIFICANT CHANGE UP (ref 0–0)
PHOSPHATE SERPL-MCNC: 3.4 MG/DL — SIGNIFICANT CHANGE UP (ref 2.5–4.5)
PLATELET # BLD AUTO: 508 K/UL — HIGH (ref 150–400)
POTASSIUM SERPL-MCNC: 4.7 MMOL/L — SIGNIFICANT CHANGE UP (ref 3.5–5.3)
POTASSIUM SERPL-SCNC: 4.7 MMOL/L — SIGNIFICANT CHANGE UP (ref 3.5–5.3)
RBC # BLD: 3.78 M/UL — LOW (ref 4.2–5.8)
RBC # FLD: 18.3 % — HIGH (ref 10.3–14.5)
SODIUM SERPL-SCNC: 135 MMOL/L — SIGNIFICANT CHANGE UP (ref 135–145)
WBC # BLD: 9.26 K/UL — SIGNIFICANT CHANGE UP (ref 3.8–10.5)
WBC # FLD AUTO: 9.26 K/UL — SIGNIFICANT CHANGE UP (ref 3.8–10.5)

## 2022-01-21 PROCEDURE — 99232 SBSQ HOSP IP/OBS MODERATE 35: CPT | Mod: GC

## 2022-01-21 PROCEDURE — 99231 SBSQ HOSP IP/OBS SF/LOW 25: CPT | Mod: GC

## 2022-01-21 PROCEDURE — 99232 SBSQ HOSP IP/OBS MODERATE 35: CPT

## 2022-01-21 RX ORDER — INSULIN LISPRO 100/ML
8 VIAL (ML) SUBCUTANEOUS
Refills: 0 | Status: DISCONTINUED | OUTPATIENT
Start: 2022-01-21 | End: 2022-01-27

## 2022-01-21 RX ORDER — PRASUGREL 5 MG/1
10 TABLET, FILM COATED ORAL DAILY
Refills: 0 | Status: DISCONTINUED | OUTPATIENT
Start: 2022-01-21 | End: 2022-01-27

## 2022-01-21 RX ADMIN — GABAPENTIN 300 MILLIGRAM(S): 400 CAPSULE ORAL at 21:37

## 2022-01-21 RX ADMIN — Medication 975 MILLIGRAM(S): at 13:07

## 2022-01-21 RX ADMIN — GABAPENTIN 300 MILLIGRAM(S): 400 CAPSULE ORAL at 13:07

## 2022-01-21 RX ADMIN — OXYCODONE HYDROCHLORIDE 10 MILLIGRAM(S): 5 TABLET ORAL at 06:37

## 2022-01-21 RX ADMIN — PRASUGREL 10 MILLIGRAM(S): 5 TABLET, FILM COATED ORAL at 16:31

## 2022-01-21 RX ADMIN — DAPTOMYCIN 124 MILLIGRAM(S): 500 INJECTION, POWDER, LYOPHILIZED, FOR SOLUTION INTRAVENOUS at 21:38

## 2022-01-21 RX ADMIN — HEPARIN SODIUM 5000 UNIT(S): 5000 INJECTION INTRAVENOUS; SUBCUTANEOUS at 05:27

## 2022-01-21 RX ADMIN — HEPARIN SODIUM 5000 UNIT(S): 5000 INJECTION INTRAVENOUS; SUBCUTANEOUS at 13:07

## 2022-01-21 RX ADMIN — Medication 975 MILLIGRAM(S): at 06:10

## 2022-01-21 RX ADMIN — Medication 25 MICROGRAM(S): at 05:27

## 2022-01-21 RX ADMIN — Medication 975 MILLIGRAM(S): at 22:37

## 2022-01-21 RX ADMIN — OXYCODONE HYDROCHLORIDE 10 MILLIGRAM(S): 5 TABLET ORAL at 18:25

## 2022-01-21 RX ADMIN — CHLORHEXIDINE GLUCONATE 1 APPLICATION(S): 213 SOLUTION TOPICAL at 05:27

## 2022-01-21 RX ADMIN — GABAPENTIN 300 MILLIGRAM(S): 400 CAPSULE ORAL at 05:27

## 2022-01-21 RX ADMIN — Medication 81 MILLIGRAM(S): at 09:23

## 2022-01-21 RX ADMIN — Medication 975 MILLIGRAM(S): at 14:07

## 2022-01-21 RX ADMIN — HYDROMORPHONE HYDROCHLORIDE 0.5 MILLIGRAM(S): 2 INJECTION INTRAMUSCULAR; INTRAVENOUS; SUBCUTANEOUS at 23:49

## 2022-01-21 RX ADMIN — DULOXETINE HYDROCHLORIDE 20 MILLIGRAM(S): 30 CAPSULE, DELAYED RELEASE ORAL at 09:23

## 2022-01-21 RX ADMIN — INSULIN GLARGINE 25 UNIT(S): 100 INJECTION, SOLUTION SUBCUTANEOUS at 21:36

## 2022-01-21 RX ADMIN — OXYCODONE HYDROCHLORIDE 10 MILLIGRAM(S): 5 TABLET ORAL at 03:25

## 2022-01-21 RX ADMIN — Medication 975 MILLIGRAM(S): at 21:37

## 2022-01-21 RX ADMIN — Medication 10 UNIT(S): at 08:50

## 2022-01-21 RX ADMIN — HEPARIN SODIUM 5000 UNIT(S): 5000 INJECTION INTRAVENOUS; SUBCUTANEOUS at 21:37

## 2022-01-21 RX ADMIN — OXYCODONE HYDROCHLORIDE 10 MILLIGRAM(S): 5 TABLET ORAL at 02:14

## 2022-01-21 RX ADMIN — Medication 975 MILLIGRAM(S): at 05:26

## 2022-01-21 RX ADMIN — SPIRONOLACTONE 25 MILLIGRAM(S): 25 TABLET, FILM COATED ORAL at 05:26

## 2022-01-21 RX ADMIN — OXYCODONE HYDROCHLORIDE 10 MILLIGRAM(S): 5 TABLET ORAL at 07:36

## 2022-01-21 RX ADMIN — ATORVASTATIN CALCIUM 80 MILLIGRAM(S): 80 TABLET, FILM COATED ORAL at 21:37

## 2022-01-21 RX ADMIN — PANTOPRAZOLE SODIUM 40 MILLIGRAM(S): 20 TABLET, DELAYED RELEASE ORAL at 05:27

## 2022-01-21 RX ADMIN — OXYCODONE HYDROCHLORIDE 10 MILLIGRAM(S): 5 TABLET ORAL at 19:24

## 2022-01-21 RX ADMIN — DULOXETINE HYDROCHLORIDE 20 MILLIGRAM(S): 30 CAPSULE, DELAYED RELEASE ORAL at 21:37

## 2022-01-21 NOTE — PROGRESS NOTE ADULT - SUBJECTIVE AND OBJECTIVE BOX
Pain Management Progress Note - [facility] [phone]    HPI: Patient seen and examined today. Patient reports tolerable level of pain today, stating pain has improved from pervious days. Patient reports adequate pain control with current pain regimen, would like to continue same pain regimen. Patient denies side effects from current pain regimen.    Pertinent PMH: Pain at: ___Back ___Neck___Knee ___Hip ___Shoulder ___ Opioid tolerance    Pain is ___X sharp ____dull ___burning ___achy ___ Intensity: ____ mild ___X_mod ____severe     Location ___X__surgical site _____cervical _____lumbar ____abd _____upper ext__X__lower ext    Worse with __X__activity ___X_movement _____physical therapy___ Rest    Improved with __X__medication __X__rest ____physical therapy    PMH:  Status post percutaneous transluminal coronary angioplasty  2 stents  Atherosclerosis of coronary artery  CAD (coronary artery disease)  Osteoarthritis  HLD (hyperlipidemia)  Blood clot due to device, implant, or graft  was on blood thinners  Diabetes  on insulin pump  HTN (hypertension)  CHF (congestive heart failure)  EF ~ 25%  History of celiac disease  Diverticulitis  STEMI (ST elevation myocardial infarction)  Diabetic neuropathy  Anxiety and depression  Other postprocedural status  Fixation hardware in foot LEFT  Stented coronary artery  10/18 heart attack  INFERIOR WALL MI  S/P CABG x 1  2018  S/P TKR (total knee replacement), right  with infection Mrsa   per pt he was cleared from MRSA infection  Surgery, elective  right knee wound debridement  History of open reduction and internal fixation (ORIF) procedure  H/O shoulder surgery  right  AICD (automatic cardioverter/defibrillator) present  Cholecystostomy care  drain inserted 2020 &amp; removed 4 months ago  History of tonsillectomy  History of hip replacement, total, right    Medications:  gabapentin  insulin lispro Injectable (ADMELOG)  insulin glargine Injectable (LANTUS)  acetaminophen     Tablet ..  cyclobenzaprine  oxyCODONE    IR  oxyCODONE    IR  spironolactone  oxycodone    5 mG/acetaminophen 325 mG  HYDROmorphone  Injectable  oxycodone    5 mG/acetaminophen 325 mG  oxycodone    5 mG/acetaminophen 325 mG  HYDROmorphone  Injectable  insulin lispro Injectable (ADMELOG)  insulin glargine Injectable (LANTUS)  dextrose 50% Injectable  dextrose 50% Injectable  dextrose 5% + sodium chloride 0.45%.  HYDROmorphone  Injectable  insulin glargine Injectable (LANTUS)  simethicone  aspirin enteric coated  HYDROmorphone  Injectable  simethicone  magnesium oxide  HYDROmorphone  Injectable  acetaminophen     Tablet ..  oxycodone    5 mG/acetaminophen 325 mG  gabapentin  DULoxetine  insulin glargine Injectable (LANTUS)  acetaminophen   IVPB ..  HYDROmorphone  Injectable  melatonin  acetaminophen   IVPB ..  insulin glargine Injectable (LANTUS)  HYDROmorphone  Injectable  HYDROmorphone  Injectable  acetaminophen   IVPB ..  HYDROmorphone  Injectable  senna  insulin glargine Injectable (LANTUS)  magnesium sulfate  IVPB  potassium chloride    Tablet ER  pantoprazole    Tablet  levothyroxine  pantoprazole  Injectable  levothyroxine Injectable  fentaNYL   Infusion  norepinephrine Infusion  propofol Infusion  chlorhexidine 0.12% Liquid  potassium chloride  10 mEq/100 mL IVPB  potassium chloride    Tablet ER  insulin glargine Injectable (LANTUS)  insulin glargine Injectable (LANTUS)  DAPTOmycin IVPB  potassium chloride   Powder  HYDROmorphone  Injectable  oxyCODONE    IR  insulin lispro (ADMELOG) corrective regimen sliding scale  insulin glargine Injectable (LANTUS)  insulin lispro Injectable (ADMELOG)  HYDROmorphone  Injectable  vancomycin  IVPB  magnesium sulfate  IVPB  HYDROmorphone  Injectable  vancomycin  IVPB  magnesium sulfate  IVPB  HYDROmorphone  Injectable  oxyCODONE    IR  acetaminophen     Tablet ..  insulin lispro Injectable (ADMELOG)  insulin glargine Injectable (LANTUS)  oxyCODONE    IR  gabapentin  chlorhexidine 2% Cloths  sodium chloride 0.9% lock flush  acetaminophen     Tablet ..  HYDROmorphone  Injectable  magnesium sulfate  IVPB  insulin glargine Injectable (LANTUS)  insulin lispro Injectable (ADMELOG)  acetaminophen   IVPB ..  HYDROmorphone  Injectable  acetaminophen   IVPB ..  heparin   Injectable  HYDROmorphone  Injectable  lactated ringers.  lactated ringers.  potassium chloride    Tablet ER  spironolactone  chlorhexidine 2% Cloths  povidone iodine 5% Nasal Swab  lactated ringers.  vancomycin  IVPB  vancomycin  IVPB  insulin lispro Injectable (ADMELOG)  vancomycin  IVPB  vancomycin  IVPB  vancomycin  IVPB  enoxaparin Injectable  HYDROmorphone  Injectable  oxyCODONE    IR  oxyCODONE    IR  traMADol  oxyCODONE    IR  HYDROmorphone  Injectable  HYDROmorphone  Injectable  HYDROmorphone  Injectable  lactated ringers.  tobramycin Injectable  vancomycin  IVPB  cyclobenzaprine  acetaminophen     Tablet ..  oxyCODONE    IR  HYDROmorphone  Injectable  HYDROmorphone  Injectable  vancomycin  IVPB  vancomycin  IVPB  chlorhexidine 2% Cloths  povidone iodine 5% Nasal Swab  insulin glargine Injectable (LANTUS)  vancomycin  IVPB  glucagon  Injectable  dextrose 5%.  dextrose 50% Injectable  dextrose 50% Injectable  dextrose 50% Injectable  dextrose 5%.  dextrose 40% Gel  insulin lispro (ADMELOG) corrective regimen sliding scale  levothyroxine  pantoprazole    Tablet  furosemide    Tablet  ALPRAZolam  rifAMPin  atorvastatin  DULoxetine  aluminum hydroxide/magnesium hydroxide/simethicone Suspension  spironolactone  lactated ringers.  HYDROmorphone  Injectable  oxyCODONE    IR  oxyCODONE    IR    ROS: Const:  __N_febrile   Eyes:___ENT:___CV: __N_chest pain  Resp: ___N_sob  GI:__N_nausea _N__vomiting __N__abd pain ___npo ___clears _Y__full diet __bm  :___ Musk: _Y__pain ___spasm  Skin:___ Neuro:  N___sedation__N_confusion__N__ numbness __N_weakness __N_paresthesia  Psych:___anxiety  Endo:___ Heme:___Allergy:_carvedilol, enalapril, entresto, ACEi__      01-21 @ 05:5557 mL/min/1.73M2<L>  Hemoglobin: 9.3 g/dL (01-21 @ 05:55)  Hemoglobin: 8.7 g/dL (01-20 @ 07:44)  T(C): 37.1 (01-21-22 @ 09:25), Max: 37.1 (01-21-22 @ 09:25)  HR: 91 (01-21-22 @ 08:44) (74 - 91)  BP: 128/65 (01-21-22 @ 08:44) (108/62 - 130/64)  RR: 17 (01-21-22 @ 08:44) (16 - 17)  SpO2: 96% (01-21-22 @ 08:44) (94% - 97%)  Wt(kg): --     PHYSICAL EXAM:  Gen Appearance: X___no acute distress _X__appropriate       Neuro: ___SILT feet____ EOM Intact Psych: AAOX3__, __X_mood/affect appropriate        Eyes: _X__conjunctiva WNL  _X____ Pupils equal and round        ENT: __X_ears and nose atraumatic__X_ Hearing grossly intact        Neck: __X_trachea midline, no visible masses ___thyroid without palpable mass    Resp: _X__Nml WOB____No tactile fremitus ___clear to auscultation    Cardio: __X_extremities free from edema ____pedal pulses palpable    GI/Abdomen: ___soft _____ Nontender_____X_Nondistended_____HSM    Lymphatic: ___no palpable nodes in neck  ___no palpable nodes calves and feet    Skin/Wound: ___Incision, _X__Dressing c/d/i,   ____surrounding tissues soft,  ___drain/chest tube present____    Muscular: EHL ___/5  Gastrocnemius___/5    _X_absent clubbing/cyanosis         ASSESSMENT:  This is a 66y old Male with a history of diabetes, CHF, HLD, HTN, STEMI, s/p right knee explant/spacer exchange 0/2020 scheduled for repeat explant, spacer exchange and revision of gastric flap, now s/p right guillotine AKA, with right limb pain moderately improved.     Recommended Treatment PLAN:  1. Continue Oxycodone 5-10 mg PO q4h PRN moderate-severe pain  2. Consider continuing Tylenol 975 mg PO TID  3. Consider continuing Flexeril 5mg PO q8h PRN muscle spasm  4. Consider continuing Dilaudid 0.5 mg IVP q4h PRN breakthrough pain  5. Consider continuing Gabapentin 300 mg PO TID  Plan discussed with Dr. Max      Pain Management Progress Note - [facility] [phone]    HPI: Patient seen and examined today. Patient reports tolerable level of pain today, stating pain has improved from previous days. Patient reports adequate pain control with current pain regimen, would like to continue same pain regimen. Patient denies side effects from current pain regimen.    Pertinent PMH: Pain at: ___Back ___Neck___Knee ___Hip ___Shoulder ___ Opioid tolerance    Pain is ___X sharp ____dull ___burning ___achy ___ Intensity: ____ mild ___X_mod ____severe     Location ___X__surgical site _____cervical _____lumbar ____abd _____upper ext__X__lower ext    Worse with __X__activity ___X_movement _____physical therapy___ Rest    Improved with __X__medication __X__rest ____physical therapy    PMH:  Status post percutaneous transluminal coronary angioplasty  2 stents  Atherosclerosis of coronary artery  CAD (coronary artery disease)  Osteoarthritis  HLD (hyperlipidemia)  Blood clot due to device, implant, or graft  was on blood thinners  Diabetes  on insulin pump  HTN (hypertension)  CHF (congestive heart failure)  EF ~ 25%  History of celiac disease  Diverticulitis  STEMI (ST elevation myocardial infarction)  Diabetic neuropathy  Anxiety and depression  Other postprocedural status  Fixation hardware in foot LEFT  Stented coronary artery  10/18 heart attack  INFERIOR WALL MI  S/P CABG x 1  2018  S/P TKR (total knee replacement), right  with infection Mrsa   per pt he was cleared from MRSA infection  Surgery, elective  right knee wound debridement  History of open reduction and internal fixation (ORIF) procedure  H/O shoulder surgery  right  AICD (automatic cardioverter/defibrillator) present  Cholecystostomy care  drain inserted 2020 &amp; removed 4 months ago  History of tonsillectomy  History of hip replacement, total, right    Medications:  gabapentin  insulin lispro Injectable (ADMELOG)  insulin glargine Injectable (LANTUS)  acetaminophen     Tablet ..  cyclobenzaprine  oxyCODONE    IR  oxyCODONE    IR  spironolactone  oxycodone    5 mG/acetaminophen 325 mG  HYDROmorphone  Injectable  oxycodone    5 mG/acetaminophen 325 mG  oxycodone    5 mG/acetaminophen 325 mG  HYDROmorphone  Injectable  insulin lispro Injectable (ADMELOG)  insulin glargine Injectable (LANTUS)  dextrose 50% Injectable  dextrose 50% Injectable  dextrose 5% + sodium chloride 0.45%.  HYDROmorphone  Injectable  insulin glargine Injectable (LANTUS)  simethicone  aspirin enteric coated  HYDROmorphone  Injectable  simethicone  magnesium oxide  HYDROmorphone  Injectable  acetaminophen     Tablet ..  oxycodone    5 mG/acetaminophen 325 mG  gabapentin  DULoxetine  insulin glargine Injectable (LANTUS)  acetaminophen   IVPB ..  HYDROmorphone  Injectable  melatonin  acetaminophen   IVPB ..  insulin glargine Injectable (LANTUS)  HYDROmorphone  Injectable  HYDROmorphone  Injectable  acetaminophen   IVPB ..  HYDROmorphone  Injectable  senna  insulin glargine Injectable (LANTUS)  magnesium sulfate  IVPB  potassium chloride    Tablet ER  pantoprazole    Tablet  levothyroxine  pantoprazole  Injectable  levothyroxine Injectable  fentaNYL   Infusion  norepinephrine Infusion  propofol Infusion  chlorhexidine 0.12% Liquid  potassium chloride  10 mEq/100 mL IVPB  potassium chloride    Tablet ER  insulin glargine Injectable (LANTUS)  insulin glargine Injectable (LANTUS)  DAPTOmycin IVPB  potassium chloride   Powder  HYDROmorphone  Injectable  oxyCODONE    IR  insulin lispro (ADMELOG) corrective regimen sliding scale  insulin glargine Injectable (LANTUS)  insulin lispro Injectable (ADMELOG)  HYDROmorphone  Injectable  vancomycin  IVPB  magnesium sulfate  IVPB  HYDROmorphone  Injectable  vancomycin  IVPB  magnesium sulfate  IVPB  HYDROmorphone  Injectable  oxyCODONE    IR  acetaminophen     Tablet ..  insulin lispro Injectable (ADMELOG)  insulin glargine Injectable (LANTUS)  oxyCODONE    IR  gabapentin  chlorhexidine 2% Cloths  sodium chloride 0.9% lock flush  acetaminophen     Tablet ..  HYDROmorphone  Injectable  magnesium sulfate  IVPB  insulin glargine Injectable (LANTUS)  insulin lispro Injectable (ADMELOG)  acetaminophen   IVPB ..  HYDROmorphone  Injectable  acetaminophen   IVPB ..  heparin   Injectable  HYDROmorphone  Injectable  lactated ringers.  lactated ringers.  potassium chloride    Tablet ER  spironolactone  chlorhexidine 2% Cloths  povidone iodine 5% Nasal Swab  lactated ringers.  vancomycin  IVPB  vancomycin  IVPB  insulin lispro Injectable (ADMELOG)  vancomycin  IVPB  vancomycin  IVPB  vancomycin  IVPB  enoxaparin Injectable  HYDROmorphone  Injectable  oxyCODONE    IR  oxyCODONE    IR  traMADol  oxyCODONE    IR  HYDROmorphone  Injectable  HYDROmorphone  Injectable  HYDROmorphone  Injectable  lactated ringers.  tobramycin Injectable  vancomycin  IVPB  cyclobenzaprine  acetaminophen     Tablet ..  oxyCODONE    IR  HYDROmorphone  Injectable  HYDROmorphone  Injectable  vancomycin  IVPB  vancomycin  IVPB  chlorhexidine 2% Cloths  povidone iodine 5% Nasal Swab  insulin glargine Injectable (LANTUS)  vancomycin  IVPB  glucagon  Injectable  dextrose 5%.  dextrose 50% Injectable  dextrose 50% Injectable  dextrose 50% Injectable  dextrose 5%.  dextrose 40% Gel  insulin lispro (ADMELOG) corrective regimen sliding scale  levothyroxine  pantoprazole    Tablet  furosemide    Tablet  ALPRAZolam  rifAMPin  atorvastatin  DULoxetine  aluminum hydroxide/magnesium hydroxide/simethicone Suspension  spironolactone  lactated ringers.  HYDROmorphone  Injectable  oxyCODONE    IR  oxyCODONE    IR    ROS: Const:  __N_febrile   Eyes:___ENT:___CV: __N_chest pain  Resp: ___N_sob  GI:__N_nausea _N__vomiting __N__abd pain ___npo ___clears _Y__full diet __bm  :___ Musk: _Y__pain ___spasm  Skin:___ Neuro:  N___sedation__N_confusion__N__ numbness __N_weakness __N_paresthesia  Psych:___anxiety  Endo:___ Heme:___Allergy:_carvedilol, enalapril, entresto, ACEi__      01-21 @ 05:5557 mL/min/1.73M2<L>  Hemoglobin: 9.3 g/dL (01-21 @ 05:55)  Hemoglobin: 8.7 g/dL (01-20 @ 07:44)  T(C): 37.1 (01-21-22 @ 09:25), Max: 37.1 (01-21-22 @ 09:25)  HR: 91 (01-21-22 @ 08:44) (74 - 91)  BP: 128/65 (01-21-22 @ 08:44) (108/62 - 130/64)  RR: 17 (01-21-22 @ 08:44) (16 - 17)  SpO2: 96% (01-21-22 @ 08:44) (94% - 97%)  Wt(kg): --     PHYSICAL EXAM:  Gen Appearance: X___no acute distress _X__appropriate       Neuro: ___SILT feet____ EOM Intact Psych: AAOX3__, __X_mood/affect appropriate        Eyes: _X__conjunctiva WNL  _X____ Pupils equal and round        ENT: __X_ears and nose atraumatic__X_ Hearing grossly intact        Neck: __X_trachea midline, no visible masses ___thyroid without palpable mass    Resp: _X__Nml WOB____No tactile fremitus ___clear to auscultation    Cardio: __X_extremities free from edema ____pedal pulses palpable    GI/Abdomen: ___soft _____ Nontender_____X_Nondistended_____HSM    Lymphatic: ___no palpable nodes in neck  ___no palpable nodes calves and feet    Skin/Wound: ___Incision, _X__Dressing c/d/i,   ____surrounding tissues soft,  ___drain/chest tube present____    Muscular: EHL ___/5  Gastrocnemius___/5    _X_absent clubbing/cyanosis         ASSESSMENT:  This is a 66y old Male with a history of diabetes, CHF, HLD, HTN, STEMI, s/p right knee explant/spacer exchange 0/2020 scheduled for repeat explant, spacer exchange and revision of gastric flap, now s/p right guillotine AKA, with right limb pain moderately improved.     Recommended Treatment PLAN:  1. Continue Oxycodone 5-10 mg PO q4h PRN moderate-severe pain  2. Consider continuing Tylenol 975 mg PO TID  3. Consider continuing Flexeril 5mg PO q8h PRN muscle spasm  4. Consider continuing Dilaudid 0.5 mg IVP q4h PRN breakthrough pain  5. Consider continuing Gabapentin 300 mg PO TID  Plan discussed with Dr. Max      Pain Management Progress Note - [facility] [phone]    HPI: Patient seen and examined today. Patient reports tolerable level of pain today, stating pain has improved from previous days. Patient reports adequate pain control with current pain regimen, would like to continue same pain regimen. Patient denies side effects from current pain regimen.    Pertinent PMH: Pain at: ___Back ___Neck___Knee ___Hip ___Shoulder ___ Opioid tolerance    Pain is ___X sharp ____dull ___burning ___achy ___ Intensity: ____ mild ___X_mod ____severe     Location ___X__surgical site _____cervical _____lumbar ____abd _____upper ext__X__lower ext    Worse with __X__activity ___X_movement _____physical therapy___ Rest    Improved with __X__medication __X__rest ____physical therapy    PMH:  Status post percutaneous transluminal coronary angioplasty  2 stents  Atherosclerosis of coronary artery  CAD (coronary artery disease)  Osteoarthritis  HLD (hyperlipidemia)  Blood clot due to device, implant, or graft  was on blood thinners  Diabetes  on insulin pump  HTN (hypertension)  CHF (congestive heart failure)  EF ~ 25%  History of celiac disease  Diverticulitis  STEMI (ST elevation myocardial infarction)  Diabetic neuropathy  Anxiety and depression  Other postprocedural status  Fixation hardware in foot LEFT  Stented coronary artery  10/18 heart attack  INFERIOR WALL MI  S/P CABG x 1  2018  S/P TKR (total knee replacement), right  with infection Mrsa   per pt he was cleared from MRSA infection  Surgery, elective  right knee wound debridement  History of open reduction and internal fixation (ORIF) procedure  H/O shoulder surgery  right  AICD (automatic cardioverter/defibrillator) present  Cholecystostomy care  drain inserted 2020 &amp; removed 4 months ago  History of tonsillectomy  History of hip replacement, total, right    Medications:  gabapentin  insulin lispro Injectable (ADMELOG)  insulin glargine Injectable (LANTUS)  acetaminophen     Tablet ..  cyclobenzaprine  oxyCODONE    IR  oxyCODONE    IR  spironolactone  oxycodone    5 mG/acetaminophen 325 mG  HYDROmorphone  Injectable  oxycodone    5 mG/acetaminophen 325 mG  oxycodone    5 mG/acetaminophen 325 mG  HYDROmorphone  Injectable  insulin lispro Injectable (ADMELOG)  insulin glargine Injectable (LANTUS)  dextrose 50% Injectable  dextrose 50% Injectable  dextrose 5% + sodium chloride 0.45%.  HYDROmorphone  Injectable  insulin glargine Injectable (LANTUS)  simethicone  aspirin enteric coated  HYDROmorphone  Injectable  simethicone  magnesium oxide  HYDROmorphone  Injectable  acetaminophen     Tablet ..  oxycodone    5 mG/acetaminophen 325 mG  gabapentin  DULoxetine  insulin glargine Injectable (LANTUS)  acetaminophen   IVPB ..  HYDROmorphone  Injectable  melatonin  acetaminophen   IVPB ..  insulin glargine Injectable (LANTUS)  HYDROmorphone  Injectable  HYDROmorphone  Injectable  acetaminophen   IVPB ..  HYDROmorphone  Injectable  senna  insulin glargine Injectable (LANTUS)  magnesium sulfate  IVPB  potassium chloride    Tablet ER  pantoprazole    Tablet  levothyroxine  pantoprazole  Injectable  levothyroxine Injectable  fentaNYL   Infusion  norepinephrine Infusion  propofol Infusion  chlorhexidine 0.12% Liquid  potassium chloride  10 mEq/100 mL IVPB  potassium chloride    Tablet ER  insulin glargine Injectable (LANTUS)  insulin glargine Injectable (LANTUS)  DAPTOmycin IVPB  potassium chloride   Powder  HYDROmorphone  Injectable  oxyCODONE    IR  insulin lispro (ADMELOG) corrective regimen sliding scale  insulin glargine Injectable (LANTUS)  insulin lispro Injectable (ADMELOG)  HYDROmorphone  Injectable  vancomycin  IVPB  magnesium sulfate  IVPB  HYDROmorphone  Injectable  vancomycin  IVPB  magnesium sulfate  IVPB  HYDROmorphone  Injectable  oxyCODONE    IR  acetaminophen     Tablet ..  insulin lispro Injectable (ADMELOG)  insulin glargine Injectable (LANTUS)  oxyCODONE    IR  gabapentin  chlorhexidine 2% Cloths  sodium chloride 0.9% lock flush  acetaminophen     Tablet ..  HYDROmorphone  Injectable  magnesium sulfate  IVPB  insulin glargine Injectable (LANTUS)  insulin lispro Injectable (ADMELOG)  acetaminophen   IVPB ..  HYDROmorphone  Injectable  acetaminophen   IVPB ..  heparin   Injectable  HYDROmorphone  Injectable  lactated ringers.  lactated ringers.  potassium chloride    Tablet ER  spironolactone  chlorhexidine 2% Cloths  povidone iodine 5% Nasal Swab  lactated ringers.  vancomycin  IVPB  vancomycin  IVPB  insulin lispro Injectable (ADMELOG)  vancomycin  IVPB  vancomycin  IVPB  vancomycin  IVPB  enoxaparin Injectable  HYDROmorphone  Injectable  oxyCODONE    IR  oxyCODONE    IR  traMADol  oxyCODONE    IR  HYDROmorphone  Injectable  HYDROmorphone  Injectable  HYDROmorphone  Injectable  lactated ringers.  tobramycin Injectable  vancomycin  IVPB  cyclobenzaprine  acetaminophen     Tablet ..  oxyCODONE    IR  HYDROmorphone  Injectable  HYDROmorphone  Injectable  vancomycin  IVPB  vancomycin  IVPB  chlorhexidine 2% Cloths  povidone iodine 5% Nasal Swab  insulin glargine Injectable (LANTUS)  vancomycin  IVPB  glucagon  Injectable  dextrose 5%.  dextrose 50% Injectable  dextrose 50% Injectable  dextrose 50% Injectable  dextrose 5%.  dextrose 40% Gel  insulin lispro (ADMELOG) corrective regimen sliding scale  levothyroxine  pantoprazole    Tablet  furosemide    Tablet  ALPRAZolam  rifAMPin  atorvastatin  DULoxetine  aluminum hydroxide/magnesium hydroxide/simethicone Suspension  spironolactone  lactated ringers.  HYDROmorphone  Injectable  oxyCODONE    IR  oxyCODONE    IR    ROS: Const:  __N_febrile   Eyes:___ENT:___CV: __N_chest pain  Resp: ___N_sob  GI:__N_nausea _N__vomiting __N__abd pain ___npo ___clears _Y__full diet __bm  :___ Musk: _Y__pain ___spasm  Skin:___ Neuro:  N___sedation__N_confusion__N__ numbness __N_weakness __N_paresthesia  Psych:___anxiety  Endo:___ Heme:___Allergy:_carvedilol, enalapril, entresto, ACEi__      01-21 @ 05:5557 mL/min/1.73M2<L>  Hemoglobin: 9.3 g/dL (01-21 @ 05:55)  Hemoglobin: 8.7 g/dL (01-20 @ 07:44)  T(C): 37.1 (01-21-22 @ 09:25), Max: 37.1 (01-21-22 @ 09:25)  HR: 91 (01-21-22 @ 08:44) (74 - 91)  BP: 128/65 (01-21-22 @ 08:44) (108/62 - 130/64)  RR: 17 (01-21-22 @ 08:44) (16 - 17)  SpO2: 96% (01-21-22 @ 08:44) (94% - 97%)  Wt(kg): --     PHYSICAL EXAM:  Gen Appearance: X___no acute distress _X__appropriate       Neuro: ___SILT feet____ EOM Intact Psych: AAOX3__, __X_mood/affect appropriate        Eyes: _X__conjunctiva WNL  _X____ Pupils equal and round        ENT: __X_ears and nose atraumatic__X_ Hearing grossly intact        Neck: __X_trachea midline, no visible masses ___thyroid without palpable mass    Resp: _X__Nml WOB____No tactile fremitus ___clear to auscultation    Cardio: __X_extremities free from edema ____pedal pulses palpable    GI/Abdomen: ___soft _____ Nontender_____X_Nondistended_____HSM    Lymphatic: ___no palpable nodes in neck  ___no palpable nodes calves and feet    Skin/Wound: ___Incision, _X__Dressing c/d/i,   ____surrounding tissues soft,  ___drain/chest tube present____    Muscular: EHL ___/5  Gastrocnemius___/5    _X_absent clubbing/cyanosis

## 2022-01-21 NOTE — PROGRESS NOTE ADULT - ASSESSMENT
66M hx of CAD with CABG , CHF (EF 20-25%) with AICD, T2DM c/b neuropathy with hx of diabetic ulcer, HLD. HTN, COPD, diverticulitis, celiac disease, anxiety and depression, cholecystostomy, R hip replacement 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 Castro (explant and abx spacer exchange), transferred here from [facility] for recurrent PJI for repeat spacer exchange and possible flap coverage with Dr. Vaughn/Dr. Bowen. Pt has been experiencing atraumatic worsening R knee pain x 1 month. Notes he went to a procedure center where his knee was aspirated 3 weeks ago. Pt was seen at [facility] yesterday where his knee was aspirated with 283k cell count. WBC 14, ESR 78, +, AVSS. Pt started on vancomycin + rifampin. 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 Now s/p Replacement, antibiotic spacer 06-Jan-2022. Also with hyperglycemia.  now s/p R AKA on vascular service     A1C: 9.0%  Weight: 97kg BMI 28  Cr/GFR: 0.9/104  EF: 20-25%

## 2022-01-21 NOTE — PROGRESS NOTE ADULT - SUBJECTIVE AND OBJECTIVE BOX
Interval Events: Reviewed  Patient seen and examined at bedside.    Patient is a 66y old  Male who presents with a chief complaint of R Knee Pain (21 Jan 2022 10:51)    he is doing well  PAST MEDICAL & SURGICAL HISTORY:  Status post percutaneous transluminal coronary angioplasty  2 stents    Atherosclerosis of coronary artery  CAD (coronary artery disease)    Osteoarthritis    HLD (hyperlipidemia)    Blood clot due to device, implant, or graft  was on blood thinners    Diabetes  on insulin pump    HTN (hypertension)    CHF (congestive heart failure)  EF ~ 25%    History of celiac disease    Diverticulitis    STEMI (ST elevation myocardial infarction)    Diabetic neuropathy    Anxiety and depression    Preoperative clearance    Other postprocedural status  Fixation hardware in foot LEFT    Stented coronary artery  10/18 heart attack  INFERIOR WALL MI    S/P CABG x 1  2018    S/P TKR (total knee replacement), right  with infection Mrsa   per pt he was cleared from MRSA infection    Surgery, elective  right knee wound debridement    History of open reduction and internal fixation (ORIF) procedure    H/O shoulder surgery  right    AICD (automatic cardioverter/defibrillator) present    Cholecystostomy care  drain inserted 2020 &amp; removed 4 months ago    History of tonsillectomy    History of hip replacement, total, right        MEDICATIONS:  Pulmonary:    Antimicrobials:  DAPTOmycin IVPB 600 milliGRAM(s) IV Intermittent every 24 hours    Anticoagulants:  aspirin enteric coated 81 milliGRAM(s) Oral daily  heparin   Injectable 5000 Unit(s) SubCutaneous every 8 hours  prasugrel 10 milliGRAM(s) Oral daily    Cardiac:  spironolactone 25 milliGRAM(s) Oral daily      Allergies    ACE inhibitors (Hives)  carvedilol (Other)  enalapril (Hives)  Entresto (Other)    Intolerances        Vital Signs Last 24 Hrs  T(C): 37.4 (21 Jan 2022 13:57), Max: 37.4 (21 Jan 2022 13:57)  T(F): 99.4 (21 Jan 2022 13:57), Max: 99.4 (21 Jan 2022 13:57)  HR: 76 (21 Jan 2022 16:34) (76 - 91)  BP: 101/61 (21 Jan 2022 16:34) (101/61 - 128/65)  BP(mean): 84 (21 Jan 2022 04:56) (79 - 84)  RR: 16 (21 Jan 2022 16:34) (16 - 17)  SpO2: 98% (21 Jan 2022 16:34) (96% - 98%)    01-20 @ 07:01 - 01-21 @ 07:00  --------------------------------------------------------  IN: 650 mL / OUT: 1250 mL / NET: -600 mL    01-21 @ 07:01 - 01-21 @ 18:50  --------------------------------------------------------  IN: 360 mL / OUT: 400 mL / NET: -40 mL          Review of Systems:   •	General: negative  •	Skin/Breast: negative  •	Ophthalmologic: negative  •	ENMT: negative  •	Respiratory and Thorax: negative  •	Cardiovascular: negative  •	Gastrointestinal: negative  •	Genitourinary: negative  •	Musculoskeletal: negative  •	Neurological: negative  •	Psychiatric: negative  •	Hematology/Lymphatics: negative  •	Endocrine: negative  •	Allergic/Immunologic: negative    Physical Exam:   • Constitutional:	refer to the dietion /Nutritionist note  • Eyes:	EOMI; PERRL; no drainage or redness  • ENMT:	No oral lesions; no gross abnormalities  • Neck	No bruits; no thyromegaly or nodules  • Breasts:	not examined  • Back:	No deformity or limitation of movement  • Respiratory:	Breath Sounds equal & clear to percussion & auscultation, no accessory muscle use  • Cardiovascular:	Regular rate & rhythm, normal S1, S2; no murmurs, gallops or rubs; no S3, S4  • Gastrointestinal:	Soft, non-tender, no hepatosplenomegaly, normal bowel sounds  • Genitourinary:	not examined  • Rectal: not examined  • Extremities:	No cyanosis, clubbing or edema  • Vascular:	Equal and normal pulses (carotid, femoral, dorsalis pedis)  • Neurologica:l	not examined  • Skin:	No lesions; no rash  • Lymph Nodes:	No lymphadedenopathy  • Musculoskeletal:	No joint pain, swelling or deformity; no limitation of movement        LABS:      CBC Full  -  ( 21 Jan 2022 05:55 )  WBC Count : 9.26 K/uL  RBC Count : 3.78 M/uL  Hemoglobin : 9.3 g/dL  Hematocrit : 32.0 %  Platelet Count - Automated : 508 K/uL  Mean Cell Volume : 84.7 fl  Mean Cell Hemoglobin : 24.6 pg  Mean Cell Hemoglobin Concentration : 29.1 gm/dL  Auto Neutrophil # : x  Auto Lymphocyte # : x  Auto Monocyte # : x  Auto Eosinophil # : x  Auto Basophil # : x  Auto Neutrophil % : x  Auto Lymphocyte % : x  Auto Monocyte % : x  Auto Eosinophil % : x  Auto Basophil % : x    01-21    135  |  101  |  17  ----------------------------<  146<H>  4.7   |  27  |  1.32<H>    Ca    8.8      21 Jan 2022 05:55  Phos  3.4     01-21  Mg     2.0     01-21                          RADIOLOGY & ADDITIONAL STUDIES (The following images were personally reviewed):

## 2022-01-21 NOTE — PROGRESS NOTE ADULT - SUBJECTIVE AND OBJECTIVE BOX
O/N: ASHLYN CHRISTIAN                                              ---------------------------------------------------------------------------  PLEASE CHECK WHEN PRESENT:  [ x ] Heart Failure  [  ] Acute  [  ] Acute on Chronic  [x  ] Chronic  -------------------------------------------------------------------  [  ]Diastolic [HFpEF]  [x  ]Systolic [HFrEF]  [  ]Combined [HFpEF & HFrEF]  [  ] afib  [x  ] hypertensive heart disease  [  ]Other:  -------------------------------------------------------------------  [ ] Respiratory failure  [ ] Acute cor pulmonale  [ ] Asthma/COPD Exacerbation  [ ] Pleural effusion  [ ] Aspiration pneumonia  -------------------------------------------------------------------  [  ]MOISES  [  ]ATN  [  ]Reneal Medullary Necrosis  [  ]Renal Cortical Necrosis  [  ]Other Pathological Lesions:    [  ]CKD 1  [  ]CKD 2  [  ]CKD 3  [  ]CKD 4  [  ]CKD 5  [  ]Other  -------------------------------------------------------------------  [ x ]Diabetes  [  ] Diabetic PVD Ulcer  [  ] Neuropathic ulcer to DM  [  ] Diabetes with Nephropathy  [  ] Osteomyelitis due to diabetes  --------------------------------------------------------------------  [  ]Malnutrition: See Nutrition Note  [  ]Cachexia  [  ]Other:   [  ]Supplement Ordered:  [  ]Morbid Obesity (BMI >=40]  ---------------------------------------------------------------------  [ ] Sepsis/severe sepsis/septic shock  [ ] UTI  [ ] Pneumonia  -----------------------------------------------------------------------  [ ] Acidosis/alkalosis  [ ] Fluid overload  [ ] Hypokalemia  [ ] Hyperkalemia  [ ] Hypomagnesemia  [ ] Hypophosphatemia  [ ] Hyperphosphatemia  ------------------------------------------------------------------------  [ ] Acute blood loss anemia  [ ] Post op blood loss anemia  [ ] Iron deficiency anemia  [ ] Anemia due to chronic disease  [ ] Hypercoagulable state  ----------------------------------------------------------------------  [ ] Cerebral infarction  [ ] Transient ischemia attack  [ ] Encephalopathy        A/P: 66yMale Hx CAD, CAD s/p MIDCAB/VERONICA 2018, AICD (2/2020), CHF (EF 15-20%), DM, 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 He was taken to OR with ortho on Revision gastroc flap by PRS, antibiotic cement spacer removal, I&D, replacement on 1/6, then taken to OR with vascular on 1/10 for right AKA. taken to OR for completion AKA today, but hypotensive on induction, required pressors, procedure aborted, kept intubated and sent to SICU. Extubated in SICU on 1/14 and stepped down. 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   - abx duration 4 weeks Dapto (EOT2/7)    DM:  -ISS  -Lantus 25, lispro 10TID  appreciate endo recs     Diet:   Consistent carb    Activity:   -PT/OT consult  - anticipate acute rehab placement when medically ready for discharge     DVTPPx: HSQ    Dispo:  pending Acute rehab placement    O/N: ANAHI, VSS      SUBJECTIVE: Patient seen at bedside in good spirits. Pain controlled. No CP, SOB, fevers or chills     Vital Signs Last 24 Hrs  T(C): 36 (21 Jan 2022 04:56), Max: 37 (20 Jan 2022 10:26)  T(F): 96.8 (21 Jan 2022 04:56), Max: 98.6 (20 Jan 2022 10:26)  HR: 78 (21 Jan 2022 04:56) (69 - 91)  BP: 112/65 (21 Jan 2022 04:56) (104/59 - 130/64)  BP(mean): 84 (21 Jan 2022 04:56) (79 - 84)  RR: 16 (21 Jan 2022 04:56) (16 - 17)  SpO2: 97% (21 Jan 2022 04:56) (94% - 97%)    Physical Exam:  Gen: NAD, resting comfortably in bed  C/V: NSR  Pulm: Nonlabored breathing, no respiratory distress  Abd: soft, NT/ND  Extrem: RLE AKA  wound clean, dry and intact. Wrapped with, Xeroform, gauze, kerlix and ace wraps   Lines/drains/tubes:    I&O's Summary    20 Jan 2022 07:01  -  21 Jan 2022 07:00  --------------------------------------------------------  IN: 650 mL / OUT: 1250 mL / NET: -600 mL        LABS:                        9.3    9.26  )-----------( 508      ( 21 Jan 2022 05:55 )             32.0     01-21    135  |  101  |  17  ----------------------------<  146<H>  4.7   |  27  |  1.32<H>    Ca    8.8      21 Jan 2022 05:55  Phos  3.4     01-21  Mg     2.0     01-21          CAPILLARY BLOOD GLUCOSE      POCT Blood Glucose.: 148 mg/dL (21 Jan 2022 07:34)  POCT Blood Glucose.: 170 mg/dL (20 Jan 2022 21:29)  POCT Blood Glucose.: 84 mg/dL (20 Jan 2022 16:43)  POCT Blood Glucose.: 75 mg/dL (20 Jan 2022 11:44)        RADIOLOGY & ADDITIONAL STUDIES:                                        ---------------------------------------------------------------------------  PLEASE CHECK WHEN PRESENT:  [ x ] Heart Failure  [  ] Acute  [  ] Acute on Chronic  [x  ] Chronic  -------------------------------------------------------------------  [  ]Diastolic [HFpEF]  [x  ]Systolic [HFrEF]  [  ]Combined [HFpEF & HFrEF]  [  ] afib  [x  ] hypertensive heart disease  [  ]Other:  -------------------------------------------------------------------  [ ] Respiratory failure  [ ] Acute cor pulmonale  [ ] Asthma/COPD Exacerbation  [ ] Pleural effusion  [ ] Aspiration pneumonia  -------------------------------------------------------------------  [  ]MOISES  [  ]ATN  [  ]Reneal Medullary Necrosis  [  ]Renal Cortical Necrosis  [  ]Other Pathological Lesions:    [  ]CKD 1  [  ]CKD 2  [  ]CKD 3  [  ]CKD 4  [  ]CKD 5  [  ]Other  -------------------------------------------------------------------  [ x ]Diabetes  [  ] Diabetic PVD Ulcer  [  ] Neuropathic ulcer to DM  [  ] Diabetes with Nephropathy  [  ] Osteomyelitis due to diabetes  --------------------------------------------------------------------  [  ]Malnutrition: See Nutrition Note  [  ]Cachexia  [  ]Other:   [  ]Supplement Ordered:  [  ]Morbid Obesity (BMI >=40]  ---------------------------------------------------------------------  [ ] Sepsis/severe sepsis/septic shock  [ ] UTI  [ ] Pneumonia  -----------------------------------------------------------------------  [ ] Acidosis/alkalosis  [ ] Fluid overload  [ ] Hypokalemia  [ ] Hyperkalemia  [ ] Hypomagnesemia  [ ] Hypophosphatemia  [ ] Hyperphosphatemia  ------------------------------------------------------------------------  [ ] Acute blood loss anemia  [ ] Post op blood loss anemia  [ ] Iron deficiency anemia  [ ] Anemia due to chronic disease  [ ] Hypercoagulable state  ----------------------------------------------------------------------  [ ] Cerebral infarction  [ ] Transient ischemia attack  [ ] Encephalopathy        A/P: 66yMale Hx CAD, CAD s/p MIDCAB/VERONICA 2018, AICD (2/2020), CHF (EF 15-20%), DM, 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 Castro (explant and abx spacer exchange), transferred here on 1/6 from 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 He was taken to OR with ortho on Revision gastroc flap by PRS, antibiotic cement spacer removal, I&D, replacement on 1/6, then taken to OR with vascular on 1/10 for right AKA. taken to OR for completion AKA today, but hypotensive on induction, required pressors, procedure aborted, kept intubated and sent to SICU. Extubated in SICU on 1/14 and stepped down. 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   - abx duration 4 weeks Dapto (EOT2/7)    DM:  -ISS  -Lantus 25, lispro 10TID  appreciate endo recs     Diet:   Consistent carb    Activity:   -PT/OT consult  - anticipate acute rehab placement when medically ready for discharge     DVTPPx: HSQ    Dispo:  pending Acute rehab placement

## 2022-01-21 NOTE — PROGRESS NOTE ADULT - SUBJECTIVE AND OBJECTIVE BOX
INTERVAL HISTORY:  Less pain in the stump. No dyspnea or chest pain.    MEDICATIONS:  MEDICATIONS  (STANDING):  acetaminophen     Tablet .. 975 milliGRAM(s) Oral every 8 hours  aspirin enteric coated 81 milliGRAM(s) Oral daily  atorvastatin 80 milliGRAM(s) Oral at bedtime  chlorhexidine 2% Cloths 1 Application(s) Topical <User Schedule>  DAPTOmycin IVPB 600 milliGRAM(s) IV Intermittent every 24 hours  dextrose 40% Gel 15 Gram(s) Oral once  dextrose 5%. 1000 milliLiter(s) (50 mL/Hr) IV Continuous <Continuous>  dextrose 5%. 1000 milliLiter(s) (100 mL/Hr) IV Continuous <Continuous>  dextrose 50% Injectable 25 Gram(s) IV Push once  dextrose 50% Injectable 12.5 Gram(s) IV Push once  dextrose 50% Injectable 25 Gram(s) IV Push once  DULoxetine 20 milliGRAM(s) Oral two times a day  gabapentin 300 milliGRAM(s) Oral three times a day  glucagon  Injectable 1 milliGRAM(s) IntraMuscular once  heparin   Injectable 5000 Unit(s) SubCutaneous every 8 hours  insulin glargine Injectable (LANTUS) 25 Unit(s) SubCutaneous at bedtime  insulin lispro (ADMELOG) corrective regimen sliding scale   SubCutaneous Before meals and at bedtime  insulin lispro Injectable (ADMELOG) 10 Unit(s) SubCutaneous three times a day before meals  levothyroxine 25 MICROGram(s) Oral daily  pantoprazole    Tablet 40 milliGRAM(s) Oral before breakfast  senna 2 Tablet(s) Oral at bedtime  spironolactone 25 milliGRAM(s) Oral daily      REVIEW OF SYSTEMS:  CONSTITUTIONAL: No fever, no weight loss, no fatigue  PULMONARY: No cough, no wheezing, chills no hemoptysis; No Shortness of Breath  CARDIOVASCULAR: No chest pain, no palpitations, no syncope, dizziness, no leg swelling  GASTROINTESTINAL: No abdominal pain. No nausea, no  vomiting, no hematemesis; No diarrhea, no constipation. No melena, no hematochezia.  GENITOURINARY: No dysuria, no frequency, no hematuria, no incontinence  SKIN: No itching, no burning, no rashes, no lesions     PHYSICAL EXAM:  T(C): 36 (01-21-22 @ 04:56), Max: 37 (01-20-22 @ 10:26)  HR: 78 (01-21-22 @ 04:56) (69 - 91)  BP: 112/65 (01-21-22 @ 04:56) (104/59 - 130/64)  RR: 16 (01-21-22 @ 04:56) (16 - 17)  SpO2: 97% (01-21-22 @ 04:56) (94% - 97%)  Wt(kg): --  I&O's Summary    20 Jan 2022 07:01  -  21 Jan 2022 07:00  --------------------------------------------------------  IN: 650 mL / OUT: 1250 mL / NET: -600 mL        Appearance:  No deformities, normal appearance	  HEENT:   Normal oral mucosa, PERRL, EOMI	  Neck: No jvd , no masses  Lymphatic: No  lymphadenopathy  Pulmonary: Lungs clear to auscultation and percussion  Cardiovascular: Normal S1 S2, No JVD, No  murmur, No edema	  Abdomen:  Soft, Non-tender, + BS  Skin: No rashes, No ecchymoses	  Extremities:  No clubbing or cyanosis, R AKA  MSK: Normal range of motion, no joint swelling    LABS:		  CARDIAC MARKERS:                         9.3    9.26  )-----------( 508      ( 21 Jan 2022 05:55 )             32.0   01-21    135  |  101  |  17  ----------------------------<  146<H>  4.7   |  27  |  1.32<H>    Ca    8.8      21 Jan 2022 05:55  Phos  3.4     01-21  Mg     2.0     01-21      proBNP:  Lipid Profile:  HgA1c:  TSH:      Culture - Tissue with Gram Stain (collected 01-18-22 @ 16:05)  Source: .Tissue Right Femur  Gram Stain (01-18-22 @ 16:06):    Test cannot be performed on this type of specimen.  Preliminary Report (01-19-22 @ 10:03):    No growth to date      TELEMETRY: a sense v pace	      QTC     ECG:  	   QTC         RADIOLOGY:   DIAGNOSTIC TESTING: [ ] Echocardiogram: [ ]  Catheterization: [ ] Stress Test:    PMH, medications, allergies Chart notes, vital signs, laboratory data, and radiology studies reviewed.    ASSESSMENT/PLAN: 	  Congestive heart failure–patient has severely reduced ejection fraction.  He is comfortable and euvolemic.  Restarted spironolactone.  Start metoprolol ER 25mg daily.    Coronary artery disease–the patient is chest pain-free.  The patient had stent thrombosis.  The plan is long-term prasugrel.  This should be restarted when stable postoperatively.    Defibrillator-The rhythm has been stable.     Diabetes–follow sugars.    Septic arthritis–status post AKA.  Afebrile with mildly elevated white blood count.  On antibiotics.

## 2022-01-21 NOTE — PROGRESS NOTE ADULT - PROBLEM SELECTOR PLAN 1
Please decrease lantus  to 25 units at night .  Please decrease lispro to 8  units before each meal.  Please continue lispro moderate dose sliding scale four times daily with meals and at bedtime    Pt's fingerstick glucose goal is 100-180.    Will continue to monitor     For discharge, pt can continue    Pt can follow up at discharge with Dr Schmitt at scheduled appt. 2/8.  Will discuss case with Dr. Luevano    and update primary team.

## 2022-01-21 NOTE — PROGRESS NOTE ADULT - SUBJECTIVE AND OBJECTIVE BOX
INTERVAL HPI/OVERNIGHT EVENTS:    Patient is a 66y old  Male who presents with a chief complaint of R Knee Pain (2022 08:50)  AKA closure . He has been sleeping most of the day. Appetite decreased most likely in setting of pain medications.    FSG & Insulin received:    Yesterday:  pre-dinner fs  bedtime fs  lantus 25 units + lispro 2    Today:  pre-breakfast fs. Lispro 10.  pre-lunch fs    Pt reports the following symptoms:    CONSTITUTIONAL:  Negative fever or chills  EYES:  Negative  blurry vision or double vision  CARDIOVASCULAR:  Negative for chest pain or palpitations  RESPIRATORY:  Negative for cough, wheezing, or SOB   GASTROINTESTINAL:  Negative for nausea, vomiting, diarrhea, constipation, or abdominal pain  GENITOURINARY:  Negative frequency, urgency or dysuria  NEUROLOGIC:  No headache, confusion, dizziness, lightheadedness    MEDICATIONS  (STANDING):  aspirin enteric coated 81 milliGRAM(s) Oral daily  atorvastatin 80 milliGRAM(s) Oral at bedtime  chlorhexidine 2% Cloths 1 Application(s) Topical <User Schedule>  DAPTOmycin IVPB 600 milliGRAM(s) IV Intermittent every 24 hours  dextrose 40% Gel 15 Gram(s) Oral once  dextrose 5%. 1000 milliLiter(s) (50 mL/Hr) IV Continuous <Continuous>  dextrose 5%. 1000 milliLiter(s) (100 mL/Hr) IV Continuous <Continuous>  dextrose 50% Injectable 25 Gram(s) IV Push once  dextrose 50% Injectable 12.5 Gram(s) IV Push once  dextrose 50% Injectable 25 Gram(s) IV Push once  DULoxetine 20 milliGRAM(s) Oral two times a day  gabapentin 200 milliGRAM(s) Oral three times a day  glucagon  Injectable 1 milliGRAM(s) IntraMuscular once  heparin   Injectable 5000 Unit(s) SubCutaneous every 8 hours  insulin glargine Injectable (LANTUS) 35 Unit(s) SubCutaneous at bedtime  insulin lispro (ADMELOG) corrective regimen sliding scale   SubCutaneous Before meals and at bedtime  insulin lispro Injectable (ADMELOG) 9 Unit(s) SubCutaneous three times a day before meals  levothyroxine 25 MICROGram(s) Oral daily  pantoprazole    Tablet 40 milliGRAM(s) Oral before breakfast  senna 2 Tablet(s) Oral at bedtime    MEDICATIONS  (PRN):  acetaminophen     Tablet .. 650 milliGRAM(s) Oral every 6 hours PRN Moderate Pain (4 - 6)  oxycodone    5 mG/acetaminophen 325 mG 1 Tablet(s) Oral every 6 hours PRN Severe Pain (7 - 10)  simethicone 80 milliGRAM(s) Chew once PRN Gas  sodium chloride 0.9% lock flush 10 milliLiter(s) IV Push every 1 hour PRN Pre/post blood products, medications, blood draw, and to maintain line patency      PHYSICAL EXAM  Vital Signs Last 24 Hrs  T(C): 35.9 (2022 14:45), Max: 36.8 (2022 18:56)  T(F): 96.7 (2022 14:45), Max: 98.3 (2022 18:56)  HR: 67 (2022 15:59) (60 - 91)  BP: 121/59 (2022 15:59) (102/51 - 129/67)  BP(mean): 85 (2022 15:59) (73 - 91)  RR: 13 (2022 15:59) (12 - 20)  SpO2: 100% (2022 15:59) (96% - 100%)    Constitutional: NAD.   HEENT: NCAT, MMM, OP clear, EOMI, , no proptosis or lid retraction  Neck: no thyromegaly or palpable thyroid nodules   Respiratory: lungs CTAB.  Cardiovascular: regular rhythm, normal S1 and S2, no audible murmurs, no peripheral edema  GI: soft, NT/ND, no masses/HSM appreciated.  Neurology: no tremors  Skin: no visible rashes/lesions, R AKA, dark rash on left shin, cool to touch   Psychiatric: AAO x 3, tired    LABS:                        9.3    11.69 )-----------( 466      ( 2022 06:48 )             30.5     -18    136  |  100  |  18  ----------------------------<  93  4.4   |  25  |  1.20    Ca    8.5      2022 06:48  Phos  3.3     -18  Mg     2.0     -18      PT/INR - ( 2022 06:48 )   PT: 14.4 sec;   INR: 1.21          PTT - ( 2022 06:48 )  PTT:27.1 sec    Thyroid Stimulating Hormone, Serum: 2.850 uIU/mL ( @ 08:25)  Thyroid Stimulating Hormone, Serum: 1.68 uIU/mL ( @ 17:00)      HbA1C:   CAPILLARY BLOOD GLUCOSE      POCT Blood Glucose.: 138 mg/dL (2022 16:06)  POCT Blood Glucose.: 71 mg/dL (2022 15:14)  POCT Blood Glucose.: 80 mg/dL (2022 11:19)  POCT Blood Glucose.: 87 mg/dL (2022 07:05)  POCT Blood Glucose.: 270 mg/dL (2022 21:39)

## 2022-01-22 LAB
ANION GAP SERPL CALC-SCNC: 11 MMOL/L — SIGNIFICANT CHANGE UP (ref 5–17)
BUN SERPL-MCNC: 17 MG/DL — SIGNIFICANT CHANGE UP (ref 7–23)
CALCIUM SERPL-MCNC: 8.1 MG/DL — LOW (ref 8.4–10.5)
CHLORIDE SERPL-SCNC: 103 MMOL/L — SIGNIFICANT CHANGE UP (ref 96–108)
CO2 SERPL-SCNC: 24 MMOL/L — SIGNIFICANT CHANGE UP (ref 22–31)
CREAT SERPL-MCNC: 1.16 MG/DL — SIGNIFICANT CHANGE UP (ref 0.5–1.3)
GLUCOSE BLDC GLUCOMTR-MCNC: 119 MG/DL — HIGH (ref 70–99)
GLUCOSE BLDC GLUCOMTR-MCNC: 144 MG/DL — HIGH (ref 70–99)
GLUCOSE BLDC GLUCOMTR-MCNC: 198 MG/DL — HIGH (ref 70–99)
GLUCOSE BLDC GLUCOMTR-MCNC: 199 MG/DL — HIGH (ref 70–99)
GLUCOSE SERPL-MCNC: 190 MG/DL — HIGH (ref 70–99)
HCT VFR BLD CALC: 30.2 % — LOW (ref 39–50)
HGB BLD-MCNC: 9 G/DL — LOW (ref 13–17)
MAGNESIUM SERPL-MCNC: 2 MG/DL — SIGNIFICANT CHANGE UP (ref 1.6–2.6)
MCHC RBC-ENTMCNC: 24.9 PG — LOW (ref 27–34)
MCHC RBC-ENTMCNC: 29.8 GM/DL — LOW (ref 32–36)
MCV RBC AUTO: 83.4 FL — SIGNIFICANT CHANGE UP (ref 80–100)
NRBC # BLD: 0 /100 WBCS — SIGNIFICANT CHANGE UP (ref 0–0)
PHOSPHATE SERPL-MCNC: 3.3 MG/DL — SIGNIFICANT CHANGE UP (ref 2.5–4.5)
PLATELET # BLD AUTO: 461 K/UL — HIGH (ref 150–400)
POTASSIUM SERPL-MCNC: 4.6 MMOL/L — SIGNIFICANT CHANGE UP (ref 3.5–5.3)
POTASSIUM SERPL-SCNC: 4.6 MMOL/L — SIGNIFICANT CHANGE UP (ref 3.5–5.3)
RBC # BLD: 3.62 M/UL — LOW (ref 4.2–5.8)
RBC # FLD: 18.1 % — HIGH (ref 10.3–14.5)
SARS-COV-2 RNA SPEC QL NAA+PROBE: SIGNIFICANT CHANGE UP
SODIUM SERPL-SCNC: 138 MMOL/L — SIGNIFICANT CHANGE UP (ref 135–145)
WBC # BLD: 8.93 K/UL — SIGNIFICANT CHANGE UP (ref 3.8–10.5)
WBC # FLD AUTO: 8.93 K/UL — SIGNIFICANT CHANGE UP (ref 3.8–10.5)

## 2022-01-22 RX ORDER — SIMETHICONE 80 MG/1
80 TABLET, CHEWABLE ORAL DAILY
Refills: 0 | Status: DISCONTINUED | OUTPATIENT
Start: 2022-01-22 | End: 2022-01-27

## 2022-01-22 RX ADMIN — Medication 81 MILLIGRAM(S): at 13:17

## 2022-01-22 RX ADMIN — OXYCODONE HYDROCHLORIDE 5 MILLIGRAM(S): 5 TABLET ORAL at 17:42

## 2022-01-22 RX ADMIN — HYDROMORPHONE HYDROCHLORIDE 0.5 MILLIGRAM(S): 2 INJECTION INTRAMUSCULAR; INTRAVENOUS; SUBCUTANEOUS at 00:10

## 2022-01-22 RX ADMIN — GABAPENTIN 300 MILLIGRAM(S): 400 CAPSULE ORAL at 16:25

## 2022-01-22 RX ADMIN — Medication 975 MILLIGRAM(S): at 16:27

## 2022-01-22 RX ADMIN — Medication 8 UNIT(S): at 16:26

## 2022-01-22 RX ADMIN — SENNA PLUS 2 TABLET(S): 8.6 TABLET ORAL at 22:32

## 2022-01-22 RX ADMIN — DULOXETINE HYDROCHLORIDE 20 MILLIGRAM(S): 30 CAPSULE, DELAYED RELEASE ORAL at 22:33

## 2022-01-22 RX ADMIN — SPIRONOLACTONE 25 MILLIGRAM(S): 25 TABLET, FILM COATED ORAL at 05:31

## 2022-01-22 RX ADMIN — GABAPENTIN 300 MILLIGRAM(S): 400 CAPSULE ORAL at 05:30

## 2022-01-22 RX ADMIN — CHLORHEXIDINE GLUCONATE 1 APPLICATION(S): 213 SOLUTION TOPICAL at 05:32

## 2022-01-22 RX ADMIN — OXYCODONE HYDROCHLORIDE 10 MILLIGRAM(S): 5 TABLET ORAL at 22:20

## 2022-01-22 RX ADMIN — HEPARIN SODIUM 5000 UNIT(S): 5000 INJECTION INTRAVENOUS; SUBCUTANEOUS at 16:25

## 2022-01-22 RX ADMIN — OXYCODONE HYDROCHLORIDE 5 MILLIGRAM(S): 5 TABLET ORAL at 18:16

## 2022-01-22 RX ADMIN — Medication 8 UNIT(S): at 08:50

## 2022-01-22 RX ADMIN — HEPARIN SODIUM 5000 UNIT(S): 5000 INJECTION INTRAVENOUS; SUBCUTANEOUS at 05:31

## 2022-01-22 RX ADMIN — GABAPENTIN 300 MILLIGRAM(S): 400 CAPSULE ORAL at 22:33

## 2022-01-22 RX ADMIN — Medication 975 MILLIGRAM(S): at 23:30

## 2022-01-22 RX ADMIN — Medication 975 MILLIGRAM(S): at 05:30

## 2022-01-22 RX ADMIN — Medication 2: at 16:25

## 2022-01-22 RX ADMIN — DULOXETINE HYDROCHLORIDE 20 MILLIGRAM(S): 30 CAPSULE, DELAYED RELEASE ORAL at 11:16

## 2022-01-22 RX ADMIN — PRASUGREL 10 MILLIGRAM(S): 5 TABLET, FILM COATED ORAL at 13:17

## 2022-01-22 RX ADMIN — Medication 975 MILLIGRAM(S): at 22:33

## 2022-01-22 RX ADMIN — INSULIN GLARGINE 25 UNIT(S): 100 INJECTION, SOLUTION SUBCUTANEOUS at 22:33

## 2022-01-22 RX ADMIN — DAPTOMYCIN 124 MILLIGRAM(S): 500 INJECTION, POWDER, LYOPHILIZED, FOR SOLUTION INTRAVENOUS at 21:42

## 2022-01-22 RX ADMIN — OXYCODONE HYDROCHLORIDE 5 MILLIGRAM(S): 5 TABLET ORAL at 12:09

## 2022-01-22 RX ADMIN — Medication 25 MICROGRAM(S): at 05:31

## 2022-01-22 RX ADMIN — HEPARIN SODIUM 5000 UNIT(S): 5000 INJECTION INTRAVENOUS; SUBCUTANEOUS at 22:33

## 2022-01-22 RX ADMIN — Medication 975 MILLIGRAM(S): at 16:31

## 2022-01-22 RX ADMIN — Medication 975 MILLIGRAM(S): at 06:15

## 2022-01-22 RX ADMIN — Medication 8 UNIT(S): at 13:16

## 2022-01-22 RX ADMIN — ATORVASTATIN CALCIUM 80 MILLIGRAM(S): 80 TABLET, FILM COATED ORAL at 22:33

## 2022-01-22 RX ADMIN — OXYCODONE HYDROCHLORIDE 10 MILLIGRAM(S): 5 TABLET ORAL at 21:42

## 2022-01-22 RX ADMIN — OXYCODONE HYDROCHLORIDE 5 MILLIGRAM(S): 5 TABLET ORAL at 08:50

## 2022-01-22 RX ADMIN — PANTOPRAZOLE SODIUM 40 MILLIGRAM(S): 20 TABLET, DELAYED RELEASE ORAL at 05:31

## 2022-01-22 NOTE — PROGRESS NOTE ADULT - SUBJECTIVE AND OBJECTIVE BOX
O/N: ASHLYN CHRISTIAN  1/21: Stump dressing changed CDI. started metoprolol 25mg and prasurgrel                                     ---------------------------------------------------------------------------  PLEASE CHECK WHEN PRESENT:  [ x ] Heart Failure  [  ] Acute  [  ] Acute on Chronic  [x  ] Chronic  -------------------------------------------------------------------  [  ]Diastolic [HFpEF]  [x  ]Systolic [HFrEF]  [  ]Combined [HFpEF & HFrEF]  [  ] afib  [x  ] hypertensive heart disease  [  ]Other:  -------------------------------------------------------------------  [ ] Respiratory failure  [ ] Acute cor pulmonale  [ ] Asthma/COPD Exacerbation  [ ] Pleural effusion  [ ] Aspiration pneumonia  -------------------------------------------------------------------  [  ]MOISES  [  ]ATN  [  ]Reneal Medullary Necrosis  [  ]Renal Cortical Necrosis  [  ]Other Pathological Lesions:    [  ]CKD 1  [  ]CKD 2  [  ]CKD 3  [  ]CKD 4  [  ]CKD 5  [  ]Other  -------------------------------------------------------------------  [ x ]Diabetes  [  ] Diabetic PVD Ulcer  [  ] Neuropathic ulcer to DM  [  ] Diabetes with Nephropathy  [  ] Osteomyelitis due to diabetes  --------------------------------------------------------------------  [  ]Malnutrition: See Nutrition Note  [  ]Cachexia  [  ]Other:   [  ]Supplement Ordered:  [  ]Morbid Obesity (BMI >=40]  ---------------------------------------------------------------------  [ ] Sepsis/severe sepsis/septic shock  [ ] UTI  [ ] Pneumonia  -----------------------------------------------------------------------  [ ] Acidosis/alkalosis  [ ] Fluid overload  [ ] Hypokalemia  [ ] Hyperkalemia  [ ] Hypomagnesemia  [ ] Hypophosphatemia  [ ] Hyperphosphatemia  ------------------------------------------------------------------------  [ ] Acute blood loss anemia  [ ] Post op blood loss anemia  [ ] Iron deficiency anemia  [ ] Anemia due to chronic disease  [ ] Hypercoagulable state  ----------------------------------------------------------------------  [ ] Cerebral infarction  [ ] Transient ischemia attack  [ ] Encephalopathy        A/P: 66yMale Hx CAD, CAD s/p MIDCAB/VERONICA 2018, AICD (2/2020), CHF (EF 15-20%), DM, 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 He was taken to OR with ortho on Revision gastroc flap by PRS, antibiotic cement spacer removal, I&D, replacement on 1/6, then taken to OR with vascular on 1/10 for right AKA. taken to OR for completion AKA today, but hypotensive on induction, required pressors, procedure aborted, kept intubated and sent to SICU. Extubated in SICU on 1/14 and stepped down. 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   - abx duration 4 weeks Dapto (EOT2/7)    DM:  -ISS  -Lantus 25, lispro 10TID  appreciate endo recs     Diet:   Consistent carb    Activity:   -PT/OT consult  - anticipate acute rehab placement when medically ready for discharge     DVTPPx: HSQ    Dispo:  pending Acute rehab placement    O/N: ANAHI, VSS  1/21: Stump dressing changed CDI. started metoprolol 25mg and prasurgrel       STATUS POST:  R AKA     SUBJECTIVE: Patient seen and examined bedside by vascular team. No complaints at this time. Denies worsening pain of R AKA site. Denies fevers, chills, sensory/motor changes of RLE.     aspirin enteric coated 81 milliGRAM(s) Oral daily  DAPTOmycin IVPB 600 milliGRAM(s) IV Intermittent every 24 hours  heparin   Injectable 5000 Unit(s) SubCutaneous every 8 hours  prasugrel 10 milliGRAM(s) Oral daily  spironolactone 25 milliGRAM(s) Oral daily      Vital Signs Last 24 Hrs  T(C): 36.1 (22 Jan 2022 06:16), Max: 37.4 (21 Jan 2022 13:57)  T(F): 96.9 (22 Jan 2022 06:16), Max: 99.4 (21 Jan 2022 13:57)  HR: 78 (22 Jan 2022 05:11) (76 - 88)  BP: 117/68 (22 Jan 2022 05:11) (101/61 - 123/68)  BP(mean): 87 (22 Jan 2022 05:11) (85 - 89)  RR: 16 (22 Jan 2022 05:11) (16 - 16)  SpO2: 97% (22 Jan 2022 05:11) (97% - 98%)  I&O's Detail    21 Jan 2022 07:01  -  22 Jan 2022 07:00  --------------------------------------------------------  IN:    IV PiggyBack: 50 mL    Oral Fluid: 360 mL  Total IN: 410 mL    OUT:    Voided (mL): 400 mL  Total OUT: 400 mL    Total NET: 10 mL          Physical Exam:  General: No acute distress, resting comfortably in bed  C/V: normal sinus rhythm  Pulm: Nonlabored breathing, no respiratory distress  Abd: soft, non-tender, non-distended.  Extrem: warm and well perfused, no edema. RLE AKA site without erythema, tenderness, or hematoma. Stitches and staples in place.     LABS:                        9.0    8.93  )-----------( 461      ( 22 Jan 2022 07:02 )             30.2     01-22    138  |  103  |  17  ----------------------------<  190<H>  4.6   |  24  |  1.16    Ca    8.1<L>      22 Jan 2022 07:02  Phos  3.3     01-22  Mg     2.0     01-22                ---------------------------------------------------------------------------  PLEASE CHECK WHEN PRESENT:  [ x ] Heart Failure  [  ] Acute  [  ] Acute on Chronic  [x  ] Chronic  -------------------------------------------------------------------  [  ]Diastolic [HFpEF]  [x  ]Systolic [HFrEF]  [  ]Combined [HFpEF & HFrEF]  [  ] afib  [x  ] hypertensive heart disease  [  ]Other:  -------------------------------------------------------------------  [ ] Respiratory failure  [ ] Acute cor pulmonale  [ ] Asthma/COPD Exacerbation  [ ] Pleural effusion  [ ] Aspiration pneumonia  -------------------------------------------------------------------  [  ]MOISES  [  ]ATN  [  ]Reneal Medullary Necrosis  [  ]Renal Cortical Necrosis  [  ]Other Pathological Lesions:    [  ]CKD 1  [  ]CKD 2  [  ]CKD 3  [  ]CKD 4  [  ]CKD 5  [  ]Other  -------------------------------------------------------------------  [ x ]Diabetes  [  ] Diabetic PVD Ulcer  [  ] Neuropathic ulcer to DM  [  ] Diabetes with Nephropathy  [  ] Osteomyelitis due to diabetes  --------------------------------------------------------------------  [  ]Malnutrition: See Nutrition Note  [  ]Cachexia  [  ]Other:   [  ]Supplement Ordered:  [  ]Morbid Obesity (BMI >=40]  ---------------------------------------------------------------------  [ ] Sepsis/severe sepsis/septic shock  [ ] UTI  [ ] Pneumonia  -----------------------------------------------------------------------  [ ] Acidosis/alkalosis  [ ] Fluid overload  [ ] Hypokalemia  [ ] Hyperkalemia  [ ] Hypomagnesemia  [ ] Hypophosphatemia  [ ] Hyperphosphatemia  ------------------------------------------------------------------------  [ ] Acute blood loss anemia  [ ] Post op blood loss anemia  [ ] Iron deficiency anemia  [ ] Anemia due to chronic disease  [ ] Hypercoagulable state  ----------------------------------------------------------------------  [ ] Cerebral infarction  [ ] Transient ischemia attack  [ ] Encephalopathy        A/P: 66yMale Hx CAD, CAD s/p MIDCAB/VERONICA 2018, AICD (2/2020), CHF (EF 15-20%), DM, 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 Castro (explant and abx spacer exchange), transferred here on 1/6 from [facility] for MRSA bacteremia due to recurrent PJI. He was taken to OR with ortho on Revision gastroc flap by PRS, antibiotic cement spacer removal, I&D, replacement on 1/6, then taken to OR with vascular on 1/10 for right AKA. taken to OR for completion AKA today, but hypotensive on induction, required pressors, procedure aborted, kept intubated and sent to SICU. Extubated in SICU on 1/14 and stepped down. 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   - abx duration 4 weeks Dapto (EOT2/7)    DM:  -ISS  -Lantus 25, lispro 10TID  appreciate endo recs     Diet:   Consistent carb    Activity:   -PT/OT consult  - anticipate acute rehab placement when medically ready for discharge     DVTPPx: HSQ    Dispo:  pending Acute rehab placement

## 2022-01-23 LAB
ANION GAP SERPL CALC-SCNC: 8 MMOL/L — SIGNIFICANT CHANGE UP (ref 5–17)
BUN SERPL-MCNC: 18 MG/DL — SIGNIFICANT CHANGE UP (ref 7–23)
CALCIUM SERPL-MCNC: 8.4 MG/DL — SIGNIFICANT CHANGE UP (ref 8.4–10.5)
CHLORIDE SERPL-SCNC: 104 MMOL/L — SIGNIFICANT CHANGE UP (ref 96–108)
CO2 SERPL-SCNC: 24 MMOL/L — SIGNIFICANT CHANGE UP (ref 22–31)
CREAT SERPL-MCNC: 1.19 MG/DL — SIGNIFICANT CHANGE UP (ref 0.5–1.3)
GLUCOSE BLDC GLUCOMTR-MCNC: 107 MG/DL — HIGH (ref 70–99)
GLUCOSE BLDC GLUCOMTR-MCNC: 117 MG/DL — HIGH (ref 70–99)
GLUCOSE BLDC GLUCOMTR-MCNC: 195 MG/DL — HIGH (ref 70–99)
GLUCOSE BLDC GLUCOMTR-MCNC: 227 MG/DL — HIGH (ref 70–99)
GLUCOSE SERPL-MCNC: 179 MG/DL — HIGH (ref 70–99)
HCT VFR BLD CALC: 30.8 % — LOW (ref 39–50)
HGB BLD-MCNC: 9 G/DL — LOW (ref 13–17)
MAGNESIUM SERPL-MCNC: 1.9 MG/DL — SIGNIFICANT CHANGE UP (ref 1.6–2.6)
MCHC RBC-ENTMCNC: 24 PG — LOW (ref 27–34)
MCHC RBC-ENTMCNC: 29.2 GM/DL — LOW (ref 32–36)
MCV RBC AUTO: 82.1 FL — SIGNIFICANT CHANGE UP (ref 80–100)
NRBC # BLD: 0 /100 WBCS — SIGNIFICANT CHANGE UP (ref 0–0)
PHOSPHATE SERPL-MCNC: 3.1 MG/DL — SIGNIFICANT CHANGE UP (ref 2.5–4.5)
PLATELET # BLD AUTO: 465 K/UL — HIGH (ref 150–400)
POTASSIUM SERPL-MCNC: 4.6 MMOL/L — SIGNIFICANT CHANGE UP (ref 3.5–5.3)
POTASSIUM SERPL-SCNC: 4.6 MMOL/L — SIGNIFICANT CHANGE UP (ref 3.5–5.3)
RBC # BLD: 3.75 M/UL — LOW (ref 4.2–5.8)
RBC # FLD: 18.1 % — HIGH (ref 10.3–14.5)
SODIUM SERPL-SCNC: 136 MMOL/L — SIGNIFICANT CHANGE UP (ref 135–145)
WBC # BLD: 8.25 K/UL — SIGNIFICANT CHANGE UP (ref 3.8–10.5)
WBC # FLD AUTO: 8.25 K/UL — SIGNIFICANT CHANGE UP (ref 3.8–10.5)

## 2022-01-23 RX ORDER — MAGNESIUM SULFATE 500 MG/ML
1 VIAL (ML) INJECTION ONCE
Refills: 0 | Status: COMPLETED | OUTPATIENT
Start: 2022-01-23 | End: 2022-01-23

## 2022-01-23 RX ORDER — NYSTATIN CREAM 100000 [USP'U]/G
1 CREAM TOPICAL ONCE
Refills: 0 | Status: COMPLETED | OUTPATIENT
Start: 2022-01-23 | End: 2022-01-23

## 2022-01-23 RX ADMIN — GABAPENTIN 300 MILLIGRAM(S): 400 CAPSULE ORAL at 22:25

## 2022-01-23 RX ADMIN — PANTOPRAZOLE SODIUM 40 MILLIGRAM(S): 20 TABLET, DELAYED RELEASE ORAL at 06:12

## 2022-01-23 RX ADMIN — Medication 2: at 07:05

## 2022-01-23 RX ADMIN — OXYCODONE HYDROCHLORIDE 10 MILLIGRAM(S): 5 TABLET ORAL at 16:29

## 2022-01-23 RX ADMIN — Medication 25 MICROGRAM(S): at 06:12

## 2022-01-23 RX ADMIN — HEPARIN SODIUM 5000 UNIT(S): 5000 INJECTION INTRAVENOUS; SUBCUTANEOUS at 06:11

## 2022-01-23 RX ADMIN — HEPARIN SODIUM 5000 UNIT(S): 5000 INJECTION INTRAVENOUS; SUBCUTANEOUS at 13:42

## 2022-01-23 RX ADMIN — HYDROMORPHONE HYDROCHLORIDE 0.5 MILLIGRAM(S): 2 INJECTION INTRAMUSCULAR; INTRAVENOUS; SUBCUTANEOUS at 02:35

## 2022-01-23 RX ADMIN — DAPTOMYCIN 124 MILLIGRAM(S): 500 INJECTION, POWDER, LYOPHILIZED, FOR SOLUTION INTRAVENOUS at 22:25

## 2022-01-23 RX ADMIN — ATORVASTATIN CALCIUM 80 MILLIGRAM(S): 80 TABLET, FILM COATED ORAL at 22:24

## 2022-01-23 RX ADMIN — NYSTATIN CREAM 1 APPLICATION(S): 100000 CREAM TOPICAL at 16:17

## 2022-01-23 RX ADMIN — DULOXETINE HYDROCHLORIDE 20 MILLIGRAM(S): 30 CAPSULE, DELAYED RELEASE ORAL at 10:11

## 2022-01-23 RX ADMIN — PRASUGREL 10 MILLIGRAM(S): 5 TABLET, FILM COATED ORAL at 13:41

## 2022-01-23 RX ADMIN — Medication 975 MILLIGRAM(S): at 13:41

## 2022-01-23 RX ADMIN — GABAPENTIN 300 MILLIGRAM(S): 400 CAPSULE ORAL at 06:11

## 2022-01-23 RX ADMIN — Medication 975 MILLIGRAM(S): at 14:45

## 2022-01-23 RX ADMIN — OXYCODONE HYDROCHLORIDE 10 MILLIGRAM(S): 5 TABLET ORAL at 22:40

## 2022-01-23 RX ADMIN — Medication 4: at 22:25

## 2022-01-23 RX ADMIN — GABAPENTIN 300 MILLIGRAM(S): 400 CAPSULE ORAL at 13:42

## 2022-01-23 RX ADMIN — CHLORHEXIDINE GLUCONATE 1 APPLICATION(S): 213 SOLUTION TOPICAL at 06:32

## 2022-01-23 RX ADMIN — Medication 8 UNIT(S): at 10:10

## 2022-01-23 RX ADMIN — HYDROMORPHONE HYDROCHLORIDE 0.5 MILLIGRAM(S): 2 INJECTION INTRAMUSCULAR; INTRAVENOUS; SUBCUTANEOUS at 02:15

## 2022-01-23 RX ADMIN — Medication 975 MILLIGRAM(S): at 07:05

## 2022-01-23 RX ADMIN — SPIRONOLACTONE 25 MILLIGRAM(S): 25 TABLET, FILM COATED ORAL at 06:12

## 2022-01-23 RX ADMIN — Medication 100 GRAM(S): at 16:17

## 2022-01-23 RX ADMIN — Medication 81 MILLIGRAM(S): at 13:42

## 2022-01-23 RX ADMIN — Medication 975 MILLIGRAM(S): at 23:00

## 2022-01-23 RX ADMIN — Medication 975 MILLIGRAM(S): at 06:11

## 2022-01-23 RX ADMIN — SIMETHICONE 80 MILLIGRAM(S): 80 TABLET, CHEWABLE ORAL at 03:45

## 2022-01-23 RX ADMIN — DULOXETINE HYDROCHLORIDE 20 MILLIGRAM(S): 30 CAPSULE, DELAYED RELEASE ORAL at 22:25

## 2022-01-23 RX ADMIN — Medication 975 MILLIGRAM(S): at 22:24

## 2022-01-23 RX ADMIN — INSULIN GLARGINE 25 UNIT(S): 100 INJECTION, SOLUTION SUBCUTANEOUS at 22:25

## 2022-01-23 RX ADMIN — HEPARIN SODIUM 5000 UNIT(S): 5000 INJECTION INTRAVENOUS; SUBCUTANEOUS at 22:25

## 2022-01-23 NOTE — PROGRESS NOTE ADULT - SUBJECTIVE AND OBJECTIVE BOX
STATUS POST:       SUBJECTIVE: Patient seen and examined bedside by chief resident.     aspirin enteric coated 81 milliGRAM(s) Oral daily  DAPTOmycin IVPB 600 milliGRAM(s) IV Intermittent every 24 hours  heparin   Injectable 5000 Unit(s) SubCutaneous every 8 hours  prasugrel 10 milliGRAM(s) Oral daily  spironolactone 25 milliGRAM(s) Oral daily      Vital Signs Last 24 Hrs  T(C): 36.3 (23 Jan 2022 07:04), Max: 37.6 (22 Jan 2022 17:54)  T(F): 97.4 (23 Jan 2022 07:04), Max: 99.6 (22 Jan 2022 17:54)  HR: 78 (23 Jan 2022 06:00) (76 - 82)  BP: 116/75 (23 Jan 2022 06:00) (107/59 - 121/69)  BP(mean): --  RR: 18 (23 Jan 2022 06:00) (16 - 18)  SpO2: 99% (23 Jan 2022 06:00) (96% - 99%)  I&O's Detail    22 Jan 2022 07:01  -  23 Jan 2022 07:00  --------------------------------------------------------  IN:    IV PiggyBack: 62 mL    Oral Fluid: 100 mL  Total IN: 162 mL    OUT:    Voided (mL): 1025 mL  Total OUT: 1025 mL    Total NET: -863 mL          Physical Exam:  General: No acute distress, resting comfortably in bed  C/V: normal sinus rhythm  Pulm: Nonlabored breathing, no respiratory distress  Abd: soft, non-tender, non-distended, incisions clean/dry/intact.  Extrem: warm and well perfused, no edema, SCDs in place    LABS:                        9.0    8.93  )-----------( 461      ( 22 Jan 2022 07:02 )             30.2     01-22    138  |  103  |  17  ----------------------------<  190<H>  4.6   |  24  |  1.16    Ca    8.1<L>      22 Jan 2022 07:02  Phos  3.3     01-22  Mg     2.0     01-22            RADIOLOGY & ADDITIONAL STUDIES:   STATUS POST:  1/10: s/p right kirill AKA  1/18: s/p primary closure R AKA       SUBJECTIVE: Patient seen and examined bedside by chief resident. No acute complaints.     aspirin enteric coated 81 milliGRAM(s) Oral daily  DAPTOmycin IVPB 600 milliGRAM(s) IV Intermittent every 24 hours  heparin   Injectable 5000 Unit(s) SubCutaneous every 8 hours  prasugrel 10 milliGRAM(s) Oral daily  spironolactone 25 milliGRAM(s) Oral daily      Vital Signs Last 24 Hrs  T(C): 36.3 (23 Jan 2022 07:04), Max: 37.6 (22 Jan 2022 17:54)  T(F): 97.4 (23 Jan 2022 07:04), Max: 99.6 (22 Jan 2022 17:54)  HR: 78 (23 Jan 2022 06:00) (76 - 82)  BP: 116/75 (23 Jan 2022 06:00) (107/59 - 121/69)  BP(mean): --  RR: 18 (23 Jan 2022 06:00) (16 - 18)  SpO2: 99% (23 Jan 2022 06:00) (96% - 99%)  I&O's Detail    22 Jan 2022 07:01  -  23 Jan 2022 07:00  --------------------------------------------------------  IN:    IV PiggyBack: 62 mL    Oral Fluid: 100 mL  Total IN: 162 mL    OUT:    Voided (mL): 1025 mL  Total OUT: 1025 mL    Total NET: -863 mL          Physical Exam:  General: No acute distress, resting comfortably in bed  C/V: normal sinus rhythm  Pulm: Nonlabored breathing, no respiratory distress  Abd: soft, non-tender, non-distended.  Extrem: warm and well perfused, no edema. RLE AKA site without erythema, tenderness, or hematoma. Stitches and staples in place.       LABS:                        9.0    8.93  )-----------( 461      ( 22 Jan 2022 07:02 )             30.2     01-22    138  |  103  |  17  ----------------------------<  190<H>  4.6   |  24  |  1.16    Ca    8.1<L>      22 Jan 2022 07:02  Phos  3.3     01-22  Mg     2.0     01-22            RADIOLOGY & ADDITIONAL STUDIES:    ---------------------------------------------------------------------------  PLEASE CHECK WHEN PRESENT:  [ x ] Heart Failure  [  ] Acute  [  ] Acute on Chronic  [x  ] Chronic  -------------------------------------------------------------------  [  ]Diastolic [HFpEF]  [x  ]Systolic [HFrEF]  [  ]Combined [HFpEF & HFrEF]  [  ] afib  [x  ] hypertensive heart disease  [  ]Other:  -------------------------------------------------------------------  [ ] Respiratory failure  [ ] Acute cor pulmonale  [ ] Asthma/COPD Exacerbation  [ ] Pleural effusion  [ ] Aspiration pneumonia  -------------------------------------------------------------------  [  ]MOISES  [  ]ATN  [  ]Reneal Medullary Necrosis  [  ]Renal Cortical Necrosis  [  ]Other Pathological Lesions:    [  ]CKD 1  [  ]CKD 2  [  ]CKD 3  [  ]CKD 4  [  ]CKD 5  [  ]Other  -------------------------------------------------------------------  [ x ]Diabetes  [  ] Diabetic PVD Ulcer  [  ] Neuropathic ulcer to DM  [  ] Diabetes with Nephropathy  [  ] Osteomyelitis due to diabetes  --------------------------------------------------------------------  [  ]Malnutrition: See Nutrition Note  [  ]Cachexia  [  ]Other:   [  ]Supplement Ordered:  [  ]Morbid Obesity (BMI >=40]  ---------------------------------------------------------------------  [ ] Sepsis/severe sepsis/septic shock  [ ] UTI  [ ] Pneumonia  -----------------------------------------------------------------------  [ ] Acidosis/alkalosis  [ ] Fluid overload  [ ] Hypokalemia  [ ] Hyperkalemia  [ ] Hypomagnesemia  [ ] Hypophosphatemia  [ ] Hyperphosphatemia  ------------------------------------------------------------------------  [ ] Acute blood loss anemia  [ ] Post op blood loss anemia  [ ] Iron deficiency anemia  [ ] Anemia due to chronic disease  [ ] Hypercoagulable state  ----------------------------------------------------------------------  [ ] Cerebral infarction  [ ] Transient ischemia attack  [ ] Encephalopathy      A/P: 66yMale Hx CAD, CAD s/p MIDCAB/VERONICA 2018, AICD (2/2020), CHF (EF 15-20%), DM, 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 He was taken to OR with ortho on Revision gastroc flap by PRS, antibiotic cement spacer removal, I&D, replacement on 1/6, then taken to OR with vascular on 1/10 for right AKA. taken to OR for completion AKA today, but hypotensive on induction, required pressors, procedure aborted, kept intubated and sent to SICU. Extubated in SICU on 1/14 and stepped down. 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   - abx duration 4 weeks Dapto (EOT2/7)    DM:  -ISS  -Lantus 25, lispro 10TID  appreciate endo recs     Diet:   Consistent carb    Activity:   -PT/OT consult  - anticipate acute rehab placement when medically ready for discharge     DVTPPx: HSQ    Dispo:  pending Acute rehab placement      STATUS POST:  1/10: s/p right kirill AKA  1/18: s/p primary closure R AKA       SUBJECTIVE: Patient seen and examined bedside by chief resident. No acute complaints.     aspirin enteric coated 81 milliGRAM(s) Oral daily  DAPTOmycin IVPB 600 milliGRAM(s) IV Intermittent every 24 hours  heparin   Injectable 5000 Unit(s) SubCutaneous every 8 hours  prasugrel 10 milliGRAM(s) Oral daily  spironolactone 25 milliGRAM(s) Oral daily      Vital Signs Last 24 Hrs  T(C): 36.3 (23 Jan 2022 07:04), Max: 37.6 (22 Jan 2022 17:54)  T(F): 97.4 (23 Jan 2022 07:04), Max: 99.6 (22 Jan 2022 17:54)  HR: 78 (23 Jan 2022 06:00) (76 - 82)  BP: 116/75 (23 Jan 2022 06:00) (107/59 - 121/69)  BP(mean): --  RR: 18 (23 Jan 2022 06:00) (16 - 18)  SpO2: 99% (23 Jan 2022 06:00) (96% - 99%)  I&O's Detail    22 Jan 2022 07:01  -  23 Jan 2022 07:00  --------------------------------------------------------  IN:    IV PiggyBack: 62 mL    Oral Fluid: 100 mL  Total IN: 162 mL    OUT:    Voided (mL): 1025 mL  Total OUT: 1025 mL    Total NET: -863 mL          Physical Exam:  General: No acute distress, resting comfortably in bed  C/V: paced rhythm   Pulm: Nonlabored breathing, no respiratory distress  Abd: soft, non-tender, non-distended.  Extrem: warm and well perfused, no edema. RLE AKA site without erythema, tenderness, or hematoma. Stitches and staples in place.       LABS:                        9.0    8.93  )-----------( 461      ( 22 Jan 2022 07:02 )             30.2     01-22    138  |  103  |  17  ----------------------------<  190<H>  4.6   |  24  |  1.16    Ca    8.1<L>      22 Jan 2022 07:02  Phos  3.3     01-22  Mg     2.0     01-22            RADIOLOGY & ADDITIONAL STUDIES:    ---------------------------------------------------------------------------  PLEASE CHECK WHEN PRESENT:  [ x ] Heart Failure  [  ] Acute  [  ] Acute on Chronic  [x  ] Chronic  -------------------------------------------------------------------  [  ]Diastolic [HFpEF]  [x  ]Systolic [HFrEF]  [  ]Combined [HFpEF & HFrEF]  [  ] afib  [x  ] hypertensive heart disease  [  ]Other:  -------------------------------------------------------------------  [ ] Respiratory failure  [ ] Acute cor pulmonale  [ ] Asthma/COPD Exacerbation  [ ] Pleural effusion  [ ] Aspiration pneumonia  -------------------------------------------------------------------  [  ]MOISES  [  ]ATN  [  ]Reneal Medullary Necrosis  [  ]Renal Cortical Necrosis  [  ]Other Pathological Lesions:    [  ]CKD 1  [  ]CKD 2  [  ]CKD 3  [  ]CKD 4  [  ]CKD 5  [  ]Other  -------------------------------------------------------------------  [ x ]Diabetes  [  ] Diabetic PVD Ulcer  [  ] Neuropathic ulcer to DM  [  ] Diabetes with Nephropathy  [  ] Osteomyelitis due to diabetes  --------------------------------------------------------------------  [  ]Malnutrition: See Nutrition Note  [  ]Cachexia  [  ]Other:   [  ]Supplement Ordered:  [  ]Morbid Obesity (BMI >=40]  ---------------------------------------------------------------------  [ ] Sepsis/severe sepsis/septic shock  [ ] UTI  [ ] Pneumonia  -----------------------------------------------------------------------  [ ] Acidosis/alkalosis  [ ] Fluid overload  [ ] Hypokalemia  [ ] Hyperkalemia  [ ] Hypomagnesemia  [ ] Hypophosphatemia  [ ] Hyperphosphatemia  ------------------------------------------------------------------------  [ ] Acute blood loss anemia  [ ] Post op blood loss anemia  [ ] Iron deficiency anemia  [ ] Anemia due to chronic disease  [ ] Hypercoagulable state  ----------------------------------------------------------------------  [ ] Cerebral infarction  [ ] Transient ischemia attack  [ ] Encephalopathy      A/P: 66yMale Hx CAD, CAD s/p MIDCAB/VERONICA 2018, AICD (2/2020), CHF (EF 15-20%), DM, 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 He was taken to OR with ortho on Revision gastroc flap by PRS, antibiotic cement spacer removal, I&D, replacement on 1/6, then taken to OR with vascular on 1/10 for right AKA. taken to OR for completion AKA today, but hypotensive on induction, required pressors, procedure aborted, kept intubated and sent to SICU. Extubated in SICU on 1/14 and stepped down. 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   - abx duration 4 weeks Dapto (EOT2/7)    DM:  -ISS  -Lantus 25, lispro 10TID  appreciate endo recs     Diet:   Consistent carb    Activity:   -PT/OT consult  - anticipate acute rehab placement when medically ready for discharge     DVTPPx: HSQ    Dispo:  pending Acute rehab placement

## 2022-01-24 LAB
GLUCOSE BLDC GLUCOMTR-MCNC: 114 MG/DL — HIGH (ref 70–99)
GLUCOSE BLDC GLUCOMTR-MCNC: 132 MG/DL — HIGH (ref 70–99)
GLUCOSE BLDC GLUCOMTR-MCNC: 181 MG/DL — HIGH (ref 70–99)
GLUCOSE BLDC GLUCOMTR-MCNC: 88 MG/DL — SIGNIFICANT CHANGE UP (ref 70–99)

## 2022-01-24 PROCEDURE — 99231 SBSQ HOSP IP/OBS SF/LOW 25: CPT | Mod: GC

## 2022-01-24 PROCEDURE — 99232 SBSQ HOSP IP/OBS MODERATE 35: CPT | Mod: GC

## 2022-01-24 PROCEDURE — 99232 SBSQ HOSP IP/OBS MODERATE 35: CPT

## 2022-01-24 RX ORDER — INSULIN GLARGINE 100 [IU]/ML
28 INJECTION, SOLUTION SUBCUTANEOUS AT BEDTIME
Refills: 0 | Status: DISCONTINUED | OUTPATIENT
Start: 2022-01-24 | End: 2022-01-27

## 2022-01-24 RX ADMIN — OXYCODONE HYDROCHLORIDE 10 MILLIGRAM(S): 5 TABLET ORAL at 17:09

## 2022-01-24 RX ADMIN — OXYCODONE HYDROCHLORIDE 10 MILLIGRAM(S): 5 TABLET ORAL at 21:56

## 2022-01-24 RX ADMIN — Medication 975 MILLIGRAM(S): at 22:56

## 2022-01-24 RX ADMIN — Medication 975 MILLIGRAM(S): at 14:30

## 2022-01-24 RX ADMIN — CHLORHEXIDINE GLUCONATE 1 APPLICATION(S): 213 SOLUTION TOPICAL at 06:49

## 2022-01-24 RX ADMIN — HYDROMORPHONE HYDROCHLORIDE 0.5 MILLIGRAM(S): 2 INJECTION INTRAMUSCULAR; INTRAVENOUS; SUBCUTANEOUS at 08:54

## 2022-01-24 RX ADMIN — ATORVASTATIN CALCIUM 80 MILLIGRAM(S): 80 TABLET, FILM COATED ORAL at 21:53

## 2022-01-24 RX ADMIN — OXYCODONE HYDROCHLORIDE 10 MILLIGRAM(S): 5 TABLET ORAL at 16:09

## 2022-01-24 RX ADMIN — GABAPENTIN 300 MILLIGRAM(S): 400 CAPSULE ORAL at 13:29

## 2022-01-24 RX ADMIN — Medication 25 MICROGRAM(S): at 05:48

## 2022-01-24 RX ADMIN — Medication 975 MILLIGRAM(S): at 21:53

## 2022-01-24 RX ADMIN — PRASUGREL 10 MILLIGRAM(S): 5 TABLET, FILM COATED ORAL at 11:42

## 2022-01-24 RX ADMIN — Medication 975 MILLIGRAM(S): at 13:29

## 2022-01-24 RX ADMIN — HEPARIN SODIUM 5000 UNIT(S): 5000 INJECTION INTRAVENOUS; SUBCUTANEOUS at 13:29

## 2022-01-24 RX ADMIN — HYDROMORPHONE HYDROCHLORIDE 0.5 MILLIGRAM(S): 2 INJECTION INTRAMUSCULAR; INTRAVENOUS; SUBCUTANEOUS at 09:15

## 2022-01-24 RX ADMIN — DULOXETINE HYDROCHLORIDE 20 MILLIGRAM(S): 30 CAPSULE, DELAYED RELEASE ORAL at 21:53

## 2022-01-24 RX ADMIN — PANTOPRAZOLE SODIUM 40 MILLIGRAM(S): 20 TABLET, DELAYED RELEASE ORAL at 06:50

## 2022-01-24 RX ADMIN — GABAPENTIN 300 MILLIGRAM(S): 400 CAPSULE ORAL at 21:53

## 2022-01-24 RX ADMIN — DULOXETINE HYDROCHLORIDE 20 MILLIGRAM(S): 30 CAPSULE, DELAYED RELEASE ORAL at 11:43

## 2022-01-24 RX ADMIN — Medication 975 MILLIGRAM(S): at 06:49

## 2022-01-24 RX ADMIN — OXYCODONE HYDROCHLORIDE 10 MILLIGRAM(S): 5 TABLET ORAL at 22:56

## 2022-01-24 RX ADMIN — INSULIN GLARGINE 28 UNIT(S): 100 INJECTION, SOLUTION SUBCUTANEOUS at 22:35

## 2022-01-24 RX ADMIN — OXYCODONE HYDROCHLORIDE 10 MILLIGRAM(S): 5 TABLET ORAL at 06:49

## 2022-01-24 RX ADMIN — HEPARIN SODIUM 5000 UNIT(S): 5000 INJECTION INTRAVENOUS; SUBCUTANEOUS at 21:52

## 2022-01-24 RX ADMIN — Medication 81 MILLIGRAM(S): at 11:42

## 2022-01-24 RX ADMIN — Medication 975 MILLIGRAM(S): at 05:49

## 2022-01-24 RX ADMIN — OXYCODONE HYDROCHLORIDE 10 MILLIGRAM(S): 5 TABLET ORAL at 00:25

## 2022-01-24 RX ADMIN — Medication 8 UNIT(S): at 08:54

## 2022-01-24 RX ADMIN — OXYCODONE HYDROCHLORIDE 10 MILLIGRAM(S): 5 TABLET ORAL at 05:49

## 2022-01-24 RX ADMIN — Medication 8 UNIT(S): at 19:11

## 2022-01-24 RX ADMIN — GABAPENTIN 300 MILLIGRAM(S): 400 CAPSULE ORAL at 05:49

## 2022-01-24 RX ADMIN — Medication 2: at 07:03

## 2022-01-24 RX ADMIN — SPIRONOLACTONE 25 MILLIGRAM(S): 25 TABLET, FILM COATED ORAL at 05:49

## 2022-01-24 RX ADMIN — Medication 1 TABLET(S): at 13:29

## 2022-01-24 RX ADMIN — DAPTOMYCIN 124 MILLIGRAM(S): 500 INJECTION, POWDER, LYOPHILIZED, FOR SOLUTION INTRAVENOUS at 21:52

## 2022-01-24 RX ADMIN — HEPARIN SODIUM 5000 UNIT(S): 5000 INJECTION INTRAVENOUS; SUBCUTANEOUS at 05:48

## 2022-01-24 NOTE — PROGRESS NOTE ADULT - SUBJECTIVE AND OBJECTIVE BOX
Pain Management Progress Note - [facility] [phone]    HPI: Patient seen and examined today. Patient reports doing well today, stating his pain has been well controlled with current pain regimen. Patient reports still needing to use Dilaudid IVP for breakthrough pain occasionally. Patient denies side effects from current pain regimen.    Pertinent PMH: Pain at: ___Back ___Neck___Knee ___Hip ___Shoulder ___ Opioid tolerance    Pain is __X_ sharp ____dull ___burning ___achy ___ Intensity: ____ mild ___X_mod __X__severe     Location ____X_surgical site _____cervical _____lumbar ____abd _____upper ext__X__lower ext    Worse with __X__activity _X___movement _____physical therapy___ Rest    Improved with __X__medication __X__rest ____physical therapy    PMH:  Status post percutaneous transluminal coronary angioplasty  2 stents  Atherosclerosis of coronary artery  CAD (coronary artery disease)  Osteoarthritis  HLD (hyperlipidemia)  Blood clot due to device, implant, or graft  was on blood thinners  Diabetes  on insulin pump  HTN (hypertension)  CHF (congestive heart failure)  EF ~ 25%  History of celiac disease  Diverticulitis  STEMI (ST elevation myocardial infarction)  Diabetic neuropathy  Anxiety and depression  Other postprocedural status  Fixation hardware in foot LEFT  Stented coronary artery  10/18 heart attack  INFERIOR WALL MI  S/P CABG x 1  2018  S/P TKR (total knee replacement), right  with infection Mrsa   per pt he was cleared from MRSA infection  Surgery, elective  right knee wound debridement  History of open reduction and internal fixation (ORIF) procedure  H/O shoulder surgery  right  AICD (automatic cardioverter/defibrillator) present  Cholecystostomy care  drain inserted 2020 &amp; removed 4 months ago  History of tonsillectomy  History of hip replacement, total, right    Medications:  magnesium sulfate  IVPB  nystatin Powder  simethicone  insulin lispro Injectable (ADMELOG)  prasugrel  gabapentin  insulin lispro Injectable (ADMELOG)  insulin glargine Injectable (LANTUS)  acetaminophen     Tablet ..  cyclobenzaprine  oxyCODONE    IR  oxyCODONE    IR  spironolactone  oxycodone    5 mG/acetaminophen 325 mG  HYDROmorphone  Injectable  oxycodone    5 mG/acetaminophen 325 mG  oxycodone    5 mG/acetaminophen 325 mG  HYDROmorphone  Injectable  insulin lispro Injectable (ADMELOG)  insulin glargine Injectable (LANTUS)  dextrose 50% Injectable  dextrose 50% Injectable  dextrose 5% + sodium chloride 0.45%.  HYDROmorphone  Injectable  insulin glargine Injectable (LANTUS)  simethicone  aspirin enteric coated  HYDROmorphone  Injectable  simethicone  magnesium oxide  HYDROmorphone  Injectable  acetaminophen     Tablet ..  oxycodone    5 mG/acetaminophen 325 mG  gabapentin  DULoxetine  insulin glargine Injectable (LANTUS)  acetaminophen   IVPB ..  HYDROmorphone  Injectable  melatonin  acetaminophen   IVPB ..  insulin glargine Injectable (LANTUS)  HYDROmorphone  Injectable  HYDROmorphone  Injectable  acetaminophen   IVPB ..  HYDROmorphone  Injectable  senna  insulin glargine Injectable (LANTUS)  magnesium sulfate  IVPB  potassium chloride    Tablet ER  pantoprazole    Tablet  levothyroxine  pantoprazole  Injectable  levothyroxine Injectable  fentaNYL   Infusion  norepinephrine Infusion  propofol Infusion  chlorhexidine 0.12% Liquid  potassium chloride  10 mEq/100 mL IVPB  potassium chloride    Tablet ER  insulin glargine Injectable (LANTUS)  insulin glargine Injectable (LANTUS)  DAPTOmycin IVPB  potassium chloride   Powder  HYDROmorphone  Injectable  oxyCODONE    IR  insulin lispro (ADMELOG) corrective regimen sliding scale  insulin glargine Injectable (LANTUS)  insulin lispro Injectable (ADMELOG)  HYDROmorphone  Injectable  vancomycin  IVPB  magnesium sulfate  IVPB  HYDROmorphone  Injectable  vancomycin  IVPB  magnesium sulfate  IVPB  HYDROmorphone  Injectable  oxyCODONE    IR  acetaminophen     Tablet ..  insulin lispro Injectable (ADMELOG)  insulin glargine Injectable (LANTUS)  oxyCODONE    IR  gabapentin  chlorhexidine 2% Cloths  sodium chloride 0.9% lock flush  acetaminophen     Tablet ..  HYDROmorphone  Injectable  magnesium sulfate  IVPB  insulin glargine Injectable (LANTUS)  insulin lispro Injectable (ADMELOG)  acetaminophen   IVPB ..  HYDROmorphone  Injectable  acetaminophen   IVPB ..  heparin   Injectable  HYDROmorphone  Injectable  lactated ringers.  lactated ringers.  potassium chloride    Tablet ER  spironolactone  chlorhexidine 2% Cloths  povidone iodine 5% Nasal Swab  lactated ringers.  vancomycin  IVPB  vancomycin  IVPB  insulin lispro Injectable (ADMELOG)  vancomycin  IVPB  vancomycin  IVPB  vancomycin  IVPB  enoxaparin Injectable  HYDROmorphone  Injectable  oxyCODONE    IR  oxyCODONE    IR  traMADol  oxyCODONE    IR  HYDROmorphone  Injectable  HYDROmorphone  Injectable  HYDROmorphone  Injectable  lactated ringers.  tobramycin Injectable  vancomycin  IVPB  cyclobenzaprine  acetaminophen     Tablet ..  oxyCODONE    IR  HYDROmorphone  Injectable  HYDROmorphone  Injectable  vancomycin  IVPB  vancomycin  IVPB  chlorhexidine 2% Cloths  povidone iodine 5% Nasal Swab  insulin glargine Injectable (LANTUS)  vancomycin  IVPB  glucagon  Injectable  dextrose 5%.  dextrose 50% Injectable  dextrose 50% Injectable  dextrose 50% Injectable  dextrose 5%.  dextrose 40% Gel  insulin lispro (ADMELOG) corrective regimen sliding scale  levothyroxine  pantoprazole    Tablet  furosemide    Tablet  ALPRAZolam  rifAMPin  atorvastatin  DULoxetine  aluminum hydroxide/magnesium hydroxide/simethicone Suspension  spironolactone  lactated ringers.  HYDROmorphone  Injectable  oxyCODONE    IR  oxyCODONE    IR    ROS: Const:  _N__febrile   Eyes:___ENT:___CV: _N__chest pain  Resp: __N__sob  GI:_N__nausea __N_vomiting __N__abd pain ___npo ___clears __Y_full diet __bm  :___ Musk: _Y__pain ___spasm  Skin:___ Neuro:  _N__sedation__N_confusion___N_ numbness _N__weakness __N_paresthesia  Psych:___anxiety  Endo:___ Heme:___Allergy:__carvedilol, enalapril, entresto, ACEi_    Hemoglobin: 9.0 g/dL (01-23 @ 11:04)  T(C): 36.2 (01-24-22 @ 09:39), Max: 37.1 (01-23-22 @ 14:25)  HR: 76 (01-24-22 @ 09:04) (76 - 88)  BP: 125/56 (01-24-22 @ 09:04) (114/61 - 156/59)  RR: 18 (01-24-22 @ 09:04) (16 - 18)  SpO2: 96% (01-24-22 @ 09:04) (96% - 100%)  Wt(kg): --     PHYSICAL EXAM:  Gen Appearance: __X_no acute distress _X__appropriate       Neuro: ___SILT feet____ EOM Intact Psych: AAOX_3_, _X__mood/affect appropriate        Eyes: _X__conjunctiva WNL  __X___ Pupils equal and round        ENT: __X_ears and nose atraumatic__X_ Hearing grossly intact        Neck: _X__trachea midline, no visible masses ___thyroid without palpable mass    Resp: _X__Nml WOB____No tactile fremitus ___clear to auscultation    Cardio: _X__extremities free from edema ____pedal pulses palpable    GI/Abdomen: ___soft _____ Nontender___X___Nondistended_____HSM    Lymphatic: ___no palpable nodes in neck  ___no palpable nodes calves and feet    Skin/Wound: ___Incision, X___Dressing c/d/i,   ____surrounding tissues soft,  ___drain/chest tube present____    Muscular: EHL ___/5  Gastrocnemius___/5    _X__absent clubbing/cyanosis         ASSESSMENT:  This is a 66y old Male with a history of diabetes, CHF, HLD, HTN, STEMI, s/p right knee explant/spacer exchange 0/2020 scheduled for repeat explant, spacer exchange and revision of gastric flap, now s/p right guillotine AKA, doing well.    Recommended Treatment PLAN:  1. Consider continuing Oxycodone 5-10 mg PO q4h PRN moderate-severe pain  2. Consider continuing Tylenol 975 mg PO TID  3. Consider continuing Flexeril 5mg PO q8h PRN muscle spasm  4. Consider continuing Dilaudid 0.5 mg IVP q4h PRN breakthrough pain  5. Consider continuing Gabapentin 300 mg PO TID  Plan discussed with Dr. Max      Pain Management Progress Note - [facility] [phone]    HPI: Patient seen and examined today. Patient reports doing well today, stating his pain has been well controlled with current pain regimen. Patient reports still needing to use Dilaudid IVP for breakthrough pain occasionally. Patient denies side effects from current pain regimen.    Pertinent PMH: Pain at: ___Back ___Neck___Knee ___Hip ___Shoulder ___ Opioid tolerance    Pain is __X_ sharp ____dull ___burning ___achy ___ Intensity: ____ mild ___X_mod __X__severe     Location ____X_surgical site _____cervical _____lumbar ____abd _____upper ext__X__lower ext    Worse with __X__activity _X___movement _____physical therapy___ Rest    Improved with __X__medication __X__rest ____physical therapy    PMH:  Status post percutaneous transluminal coronary angioplasty  2 stents  Atherosclerosis of coronary artery  CAD (coronary artery disease)  Osteoarthritis  HLD (hyperlipidemia)  Blood clot due to device, implant, or graft  was on blood thinners  Diabetes  on insulin pump  HTN (hypertension)  CHF (congestive heart failure)  EF ~ 25%  History of celiac disease  Diverticulitis  STEMI (ST elevation myocardial infarction)  Diabetic neuropathy  Anxiety and depression  Other postprocedural status  Fixation hardware in foot LEFT  Stented coronary artery  10/18 heart attack  INFERIOR WALL MI  S/P CABG x 1  2018  S/P TKR (total knee replacement), right  with infection Mrsa   per pt he was cleared from MRSA infection  Surgery, elective  right knee wound debridement  History of open reduction and internal fixation (ORIF) procedure  H/O shoulder surgery  right  AICD (automatic cardioverter/defibrillator) present  Cholecystostomy care  drain inserted 2020 &amp; removed 4 months ago  History of tonsillectomy  History of hip replacement, total, right    Medications:  magnesium sulfate  IVPB  nystatin Powder  simethicone  insulin lispro Injectable (ADMELOG)  prasugrel  gabapentin  insulin lispro Injectable (ADMELOG)  insulin glargine Injectable (LANTUS)  acetaminophen     Tablet ..  cyclobenzaprine  oxyCODONE    IR  oxyCODONE    IR  spironolactone  oxycodone    5 mG/acetaminophen 325 mG  HYDROmorphone  Injectable  oxycodone    5 mG/acetaminophen 325 mG  oxycodone    5 mG/acetaminophen 325 mG  HYDROmorphone  Injectable  insulin lispro Injectable (ADMELOG)  insulin glargine Injectable (LANTUS)  dextrose 50% Injectable  dextrose 50% Injectable  dextrose 5% + sodium chloride 0.45%.  HYDROmorphone  Injectable  insulin glargine Injectable (LANTUS)  simethicone  aspirin enteric coated  HYDROmorphone  Injectable  simethicone  magnesium oxide  HYDROmorphone  Injectable  acetaminophen     Tablet ..  oxycodone    5 mG/acetaminophen 325 mG  gabapentin  DULoxetine  insulin glargine Injectable (LANTUS)  acetaminophen   IVPB ..  HYDROmorphone  Injectable  melatonin  acetaminophen   IVPB ..  insulin glargine Injectable (LANTUS)  HYDROmorphone  Injectable  HYDROmorphone  Injectable  acetaminophen   IVPB ..  HYDROmorphone  Injectable  senna  insulin glargine Injectable (LANTUS)  magnesium sulfate  IVPB  potassium chloride    Tablet ER  pantoprazole    Tablet  levothyroxine  pantoprazole  Injectable  levothyroxine Injectable  fentaNYL   Infusion  norepinephrine Infusion  propofol Infusion  chlorhexidine 0.12% Liquid  potassium chloride  10 mEq/100 mL IVPB  potassium chloride    Tablet ER  insulin glargine Injectable (LANTUS)  insulin glargine Injectable (LANTUS)  DAPTOmycin IVPB  potassium chloride   Powder  HYDROmorphone  Injectable  oxyCODONE    IR  insulin lispro (ADMELOG) corrective regimen sliding scale  insulin glargine Injectable (LANTUS)  insulin lispro Injectable (ADMELOG)  HYDROmorphone  Injectable  vancomycin  IVPB  magnesium sulfate  IVPB  HYDROmorphone  Injectable  vancomycin  IVPB  magnesium sulfate  IVPB  HYDROmorphone  Injectable  oxyCODONE    IR  acetaminophen     Tablet ..  insulin lispro Injectable (ADMELOG)  insulin glargine Injectable (LANTUS)  oxyCODONE    IR  gabapentin  chlorhexidine 2% Cloths  sodium chloride 0.9% lock flush  acetaminophen     Tablet ..  HYDROmorphone  Injectable  magnesium sulfate  IVPB  insulin glargine Injectable (LANTUS)  insulin lispro Injectable (ADMELOG)  acetaminophen   IVPB ..  HYDROmorphone  Injectable  acetaminophen   IVPB ..  heparin   Injectable  HYDROmorphone  Injectable  lactated ringers.  lactated ringers.  potassium chloride    Tablet ER  spironolactone  chlorhexidine 2% Cloths  povidone iodine 5% Nasal Swab  lactated ringers.  vancomycin  IVPB  vancomycin  IVPB  insulin lispro Injectable (ADMELOG)  vancomycin  IVPB  vancomycin  IVPB  vancomycin  IVPB  enoxaparin Injectable  HYDROmorphone  Injectable  oxyCODONE    IR  oxyCODONE    IR  traMADol  oxyCODONE    IR  HYDROmorphone  Injectable  HYDROmorphone  Injectable  HYDROmorphone  Injectable  lactated ringers.  tobramycin Injectable  vancomycin  IVPB  cyclobenzaprine  acetaminophen     Tablet ..  oxyCODONE    IR  HYDROmorphone  Injectable  HYDROmorphone  Injectable  vancomycin  IVPB  vancomycin  IVPB  chlorhexidine 2% Cloths  povidone iodine 5% Nasal Swab  insulin glargine Injectable (LANTUS)  vancomycin  IVPB  glucagon  Injectable  dextrose 5%.  dextrose 50% Injectable  dextrose 50% Injectable  dextrose 50% Injectable  dextrose 5%.  dextrose 40% Gel  insulin lispro (ADMELOG) corrective regimen sliding scale  levothyroxine  pantoprazole    Tablet  furosemide    Tablet  ALPRAZolam  rifAMPin  atorvastatin  DULoxetine  aluminum hydroxide/magnesium hydroxide/simethicone Suspension  spironolactone  lactated ringers.  HYDROmorphone  Injectable  oxyCODONE    IR  oxyCODONE    IR    ROS: Const:  _N__febrile   Eyes:___ENT:___CV: _N__chest pain  Resp: __N__sob  GI:_N__nausea __N_vomiting __N__abd pain ___npo ___clears __Y_full diet __bm  :___ Musk: _Y__pain ___spasm  Skin:___ Neuro:  _N__sedation__N_confusion___N_ numbness _N__weakness __N_paresthesia  Psych:___anxiety  Endo:___ Heme:___Allergy:__carvedilol, enalapril, entresto, ACEi_    Hemoglobin: 9.0 g/dL (01-23 @ 11:04)  T(C): 36.2 (01-24-22 @ 09:39), Max: 37.1 (01-23-22 @ 14:25)  HR: 76 (01-24-22 @ 09:04) (76 - 88)  BP: 125/56 (01-24-22 @ 09:04) (114/61 - 156/59)  RR: 18 (01-24-22 @ 09:04) (16 - 18)  SpO2: 96% (01-24-22 @ 09:04) (96% - 100%)  Wt(kg): --     PHYSICAL EXAM:  Gen Appearance: __X_no acute distress _X__appropriate       Neuro: ___SILT feet____ EOM Intact Psych: AAOX_3_, _X__mood/affect appropriate        Eyes: _X__conjunctiva WNL  __X___ Pupils equal and round        ENT: __X_ears and nose atraumatic__X_ Hearing grossly intact        Neck: _X__trachea midline, no visible masses ___thyroid without palpable mass    Resp: _X__Nml WOB____No tactile fremitus ___clear to auscultation    Cardio: _X__extremities free from edema ____pedal pulses palpable    GI/Abdomen: ___soft _____ Nontender___X___Nondistended_____HSM    Lymphatic: ___no palpable nodes in neck  ___no palpable nodes calves and feet    Skin/Wound: ___Incision, X___Dressing c/d/i,   ____surrounding tissues soft,  ___drain/chest tube present____    Muscular: EHL ___/5  Gastrocnemius___/5    _X__absent clubbing/cyanosis

## 2022-01-24 NOTE — PROGRESS NOTE ADULT - SUBJECTIVE AND OBJECTIVE BOX
O/N; ASHLYN, ANAHI  1/22: Stump dressing changed CDI, given nystating powder to rash area on right flank/buttock, ASHLYN CHRISTIAN            ---------------------------------------------------------------------------  PLEASE CHECK WHEN PRESENT:  [ x ] Heart Failure  [  ] Acute  [  ] Acute on Chronic  [x  ] Chronic  -------------------------------------------------------------------  [  ]Diastolic [HFpEF]  [x  ]Systolic [HFrEF]  [  ]Combined [HFpEF & HFrEF]  [  ] afib  [x  ] hypertensive heart disease  [  ]Other:  -------------------------------------------------------------------  [ ] Respiratory failure  [ ] Acute cor pulmonale  [ ] Asthma/COPD Exacerbation  [ ] Pleural effusion  [ ] Aspiration pneumonia  -------------------------------------------------------------------  [  ]MOISES  [  ]ATN  [  ]Reneal Medullary Necrosis  [  ]Renal Cortical Necrosis  [  ]Other Pathological Lesions:    [  ]CKD 1  [  ]CKD 2  [  ]CKD 3  [  ]CKD 4  [  ]CKD 5  [  ]Other  -------------------------------------------------------------------  [ x ]Diabetes  [  ] Diabetic PVD Ulcer  [  ] Neuropathic ulcer to DM  [  ] Diabetes with Nephropathy  [  ] Osteomyelitis due to diabetes  --------------------------------------------------------------------  [  ]Malnutrition: See Nutrition Note  [  ]Cachexia  [  ]Other:   [  ]Supplement Ordered:  [  ]Morbid Obesity (BMI >=40]  ---------------------------------------------------------------------  [ ] Sepsis/severe sepsis/septic shock  [ ] UTI  [ ] Pneumonia  -----------------------------------------------------------------------  [ ] Acidosis/alkalosis  [ ] Fluid overload  [ ] Hypokalemia  [ ] Hyperkalemia  [ ] Hypomagnesemia  [ ] Hypophosphatemia  [ ] Hyperphosphatemia  ------------------------------------------------------------------------  [ ] Acute blood loss anemia  [ ] Post op blood loss anemia  [ ] Iron deficiency anemia  [ ] Anemia due to chronic disease  [ ] Hypercoagulable state  ----------------------------------------------------------------------  [ ] Cerebral infarction  [ ] Transient ischemia attack  [ ] Encephalopathy        A/P: 66yMale Hx CAD, CAD s/p MIDCAB/VERONICA 2018, AICD (2/2020), CHF (EF 15-20%), DM, 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 Castro (explant and abx spacer exchange), transferred here on 1/6 from 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 He was taken to OR with ortho on Revision gastroc flap by PRS, antibiotic cement spacer removal, I&D, replacement on 1/6, then taken to OR with vascular on 1/10 for right AKA. taken to OR for completion AKA today, but hypotensive on induction, required pressors, procedure aborted, kept intubated and sent to SICU. Extubated in SICU on 1/14 and stepped down. 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   - abx duration 4 weeks Dapto (EOT2/7)    DM:  -ISS  -Lantus 25, lispro 10TID  appreciate endo recs     Diet:   Consistent carb    Activity:   -PT/OT consult  - anticipate acute rehab placement     DVTPPx: HSQ    Dispo:  pending Acute rehab placement  O/N; VSS, ANAHI  1/22: Stump dressing changed CDI, given nystating powder to rash area on right flank/buttock, ANAHI, VSS      SUBJECTIVE: Patient seen at bedside in no acute distress in good spirits. No CP, SOb, fevers or chills     Vital Signs Last 24 Hrs  T(C): 36.2 (24 Jan 2022 05:11), Max: 37.1 (23 Jan 2022 14:25)  T(F): 97.2 (24 Jan 2022 05:11), Max: 98.7 (23 Jan 2022 14:25)  HR: 81 (24 Jan 2022 05:47) (73 - 88)  BP: 114/61 (24 Jan 2022 05:47) (107/62 - 156/59)  BP(mean): --  RR: 16 (24 Jan 2022 05:47) (16 - 18)  SpO2: 98% (24 Jan 2022 05:47) (97% - 100%)    Physical Exam:  General: No acute distress, resting comfortably in bed  C/V: paced rhythm   Pulm: Nonlabored breathing, no respiratory distress  Abd: soft, non-tender, non-distended.  Extrem: warm and well perfused, no edema. RLE AKA site without erythema, tenderness, or hematoma. Stitches and staples in place.     Lines/drains/tubes:    I&O's Summary    22 Jan 2022 07:01  -  23 Jan 2022 07:00  --------------------------------------------------------  IN: 262 mL / OUT: 1025 mL / NET: -763 mL    23 Jan 2022 07:01  -  24 Jan 2022 06:42  --------------------------------------------------------  IN: 460 mL / OUT: 1400 mL / NET: -940 mL        LABS:                        9.0    8.25  )-----------( 465      ( 23 Jan 2022 11:04 )             30.8     01-23    136  |  104  |  18  ----------------------------<  179<H>  4.6   |  24  |  1.19    Ca    8.4      23 Jan 2022 11:04  Phos  3.1     01-23  Mg     1.9     01-23          CAPILLARY BLOOD GLUCOSE      POCT Blood Glucose.: 227 mg/dL (23 Jan 2022 21:59)  POCT Blood Glucose.: 117 mg/dL (23 Jan 2022 17:08)  POCT Blood Glucose.: 107 mg/dL (23 Jan 2022 12:46)  POCT Blood Glucose.: 195 mg/dL (23 Jan 2022 06:44)        RADIOLOGY & ADDITIONAL STUDIES:          ---------------------------------------------------------------------------  PLEASE CHECK WHEN PRESENT:  [ x ] Heart Failure  [  ] Acute  [  ] Acute on Chronic  [x  ] Chronic  -------------------------------------------------------------------  [  ]Diastolic [HFpEF]  [x  ]Systolic [HFrEF]  [  ]Combined [HFpEF & HFrEF]  [  ] afib  [x  ] hypertensive heart disease  [  ]Other:  -------------------------------------------------------------------  [ ] Respiratory failure  [ ] Acute cor pulmonale  [ ] Asthma/COPD Exacerbation  [ ] Pleural effusion  [ ] Aspiration pneumonia  -------------------------------------------------------------------  [  ]MOISES  [  ]ATN  [  ]Reneal Medullary Necrosis  [  ]Renal Cortical Necrosis  [  ]Other Pathological Lesions:    [  ]CKD 1  [  ]CKD 2  [  ]CKD 3  [  ]CKD 4  [  ]CKD 5  [  ]Other  -------------------------------------------------------------------  [ x ]Diabetes  [  ] Diabetic PVD Ulcer  [  ] Neuropathic ulcer to DM  [  ] Diabetes with Nephropathy  [  ] Osteomyelitis due to diabetes  --------------------------------------------------------------------  [  ]Malnutrition: See Nutrition Note  [  ]Cachexia  [  ]Other:   [  ]Supplement Ordered:  [  ]Morbid Obesity (BMI >=40]  ---------------------------------------------------------------------  [ ] Sepsis/severe sepsis/septic shock  [ ] UTI  [ ] Pneumonia  -----------------------------------------------------------------------  [ ] Acidosis/alkalosis  [ ] Fluid overload  [ ] Hypokalemia  [ ] Hyperkalemia  [ ] Hypomagnesemia  [ ] Hypophosphatemia  [ ] Hyperphosphatemia  ------------------------------------------------------------------------  [ ] Acute blood loss anemia  [ ] Post op blood loss anemia  [ ] Iron deficiency anemia  [ ] Anemia due to chronic disease  [ ] Hypercoagulable state  ----------------------------------------------------------------------  [ ] Cerebral infarction  [ ] Transient ischemia attack  [ ] Encephalopathy        A/P: 66yMale Hx CAD, CAD s/p MIDCAB/VERONICA 2018, AICD (2/2020), CHF (EF 15-20%), DM, 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 Castro (explant and abx spacer exchange), transferred here on 1/6 from [facility] for MRSA bacteremia due to recurrent PJI. He was taken to OR with ortho on Revision gastroc flap by PRS, antibiotic cement spacer removal, I&D, replacement on 1/6, then taken to OR with vascular on 1/10 for right AKA. taken to OR for completion AKA today, but hypotensive on induction, required pressors, procedure aborted, kept intubated and sent to SICU. Extubated in SICU on 1/14 and stepped down. 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   - abx duration 4 weeks Dapto (EOT2/7)    DM:  -ISS  -Lantus 25, lispro 10TID  appreciate endo recs     Diet:   Consistent carb    Activity:   -PT/OT consult  - anticipate acute rehab placement     DVTPPx: HSQ    Dispo:  pending Acute rehab placement

## 2022-01-24 NOTE — PROGRESS NOTE ADULT - SUBJECTIVE AND OBJECTIVE BOX
Interval Events: Reviewed  Patient seen and examined at bedside.    Patient is a 66y old  Male who presents with a chief complaint of R Knee Pain (24 Jan 2022 19:24)  he is better    PAST MEDICAL & SURGICAL HISTORY:  Status post percutaneous transluminal coronary angioplasty  2 stents    Atherosclerosis of coronary artery  CAD (coronary artery disease)    Osteoarthritis    HLD (hyperlipidemia)    Blood clot due to device, implant, or graft  was on blood thinners    Diabetes  on insulin pump    HTN (hypertension)    CHF (congestive heart failure)  EF ~ 25%    History of celiac disease    Diverticulitis    STEMI (ST elevation myocardial infarction)    Diabetic neuropathy    Anxiety and depression    Preoperative clearance    Other postprocedural status  Fixation hardware in foot LEFT    Stented coronary artery  10/18 heart attack  INFERIOR WALL MI    S/P CABG x 1  2018    S/P TKR (total knee replacement), right  with infection Mrsa   per pt he was cleared from MRSA infection    Surgery, elective  right knee wound debridement    History of open reduction and internal fixation (ORIF) procedure    H/O shoulder surgery  right    AICD (automatic cardioverter/defibrillator) present    Cholecystostomy care  drain inserted 2020 &amp; removed 4 months ago    History of tonsillectomy    History of hip replacement, total, right        MEDICATIONS:  Pulmonary:    Antimicrobials:  DAPTOmycin IVPB 600 milliGRAM(s) IV Intermittent every 24 hours    Anticoagulants:  aspirin enteric coated 81 milliGRAM(s) Oral daily  heparin   Injectable 5000 Unit(s) SubCutaneous every 8 hours  prasugrel 10 milliGRAM(s) Oral daily    Cardiac:  spironolactone 25 milliGRAM(s) Oral daily      Allergies    ACE inhibitors (Hives)  carvedilol (Other)  enalapril (Hives)  Entresto (Other)    Intolerances        Vital Signs Last 24 Hrs  T(C): 36.2 (24 Jan 2022 18:45), Max: 36.4 (23 Jan 2022 22:36)  T(F): 97.2 (24 Jan 2022 18:45), Max: 97.6 (23 Jan 2022 22:36)  HR: 78 (24 Jan 2022 20:56) (76 - 88)  BP: 118/59 (24 Jan 2022 20:56) (104/59 - 151/70)  BP(mean): --  RR: 16 (24 Jan 2022 20:56) (16 - 18)  SpO2: 96% (24 Jan 2022 20:56) (96% - 98%)    01-23 @ 07:01 - 01-24 @ 07:00  --------------------------------------------------------  IN: 460 mL / OUT: 1400 mL / NET: -940 mL    01-24 @ 07:01 - 01-24 @ 21:57  --------------------------------------------------------  IN: 540 mL / OUT: 980 mL / NET: -440 mL          Review of Systems:   •	General: negative  •	Skin/Breast: negative  •	Ophthalmologic: negative  •	ENMT: negative  •	Respiratory and Thorax: negative  •	Cardiovascular: negative  •	Gastrointestinal: negative  •	Genitourinary: negative  •	Musculoskeletal: negative  •	Neurological: negative  •	Psychiatric: negative  •	Hematology/Lymphatics: negative  •	Endocrine: negative  •	Allergic/Immunologic: negative    Physical Exam:   • Constitutional:	refer to the dietion /Nutritionist note  • Eyes:	EOMI; PERRL; no drainage or redness  • ENMT:	No oral lesions; no gross abnormalities  • Neck	No bruits; no thyromegaly or nodules  • Breasts:	not examined  • Back:	No deformity or limitation of movement  • Respiratory:	Breath Sounds equal & clear to percussion & auscultation, no accessory muscle use  • Cardiovascular:	Regular rate & rhythm, normal S1, S2; no murmurs, gallops or rubs; no S3, S4  • Gastrointestinal:	Soft, non-tender, no hepatosplenomegaly, normal bowel sounds  • Genitourinary:	not examined  • Rectal: not examined  • Extremities:	No cyanosis, clubbing or edema  • Vascular:	Equal and normal pulses (carotid, femoral, dorsalis pedis)  • Neurologica:l	not examined  • Skin:	No lesions; no rash  • Lymph Nodes:	No lymphadedenopathy  • Musculoskeletal:	No joint pain, swelling or deformity; no limitation of movement        LABS:      CBC Full  -  ( 23 Jan 2022 11:04 )  WBC Count : 8.25 K/uL  RBC Count : 3.75 M/uL  Hemoglobin : 9.0 g/dL  Hematocrit : 30.8 %  Platelet Count - Automated : 465 K/uL  Mean Cell Volume : 82.1 fl  Mean Cell Hemoglobin : 24.0 pg  Mean Cell Hemoglobin Concentration : 29.2 gm/dL  Auto Neutrophil # : x  Auto Lymphocyte # : x  Auto Monocyte # : x  Auto Eosinophil # : x  Auto Basophil # : x  Auto Neutrophil % : x  Auto Lymphocyte % : x  Auto Monocyte % : x  Auto Eosinophil % : x  Auto Basophil % : x    01-23    136  |  104  |  18  ----------------------------<  179<H>  4.6   |  24  |  1.19    Ca    8.4      23 Jan 2022 11:04  Phos  3.1     01-23  Mg     1.9     01-23                          RADIOLOGY & ADDITIONAL STUDIES (The following images were personally reviewed):

## 2022-01-24 NOTE — CHART NOTE - NSCHARTNOTEFT_GEN_A_CORE
Admitting Diagnosis:   Patient is a 66y old  Male who presents with a chief complaint of R Knee Pain (24 Jan 2022 11:50)      PAST MEDICAL & SURGICAL HISTORY:  Status post percutaneous transluminal coronary angioplasty  2 stents    Atherosclerosis of coronary artery  CAD (coronary artery disease)    Osteoarthritis    HLD (hyperlipidemia)    Blood clot due to device, implant, or graft  was on blood thinners    Diabetes  on insulin pump    HTN (hypertension)    CHF (congestive heart failure)  EF ~ 25%    History of celiac disease    Diverticulitis    STEMI (ST elevation myocardial infarction)    Diabetic neuropathy    Anxiety and depression    Preoperative clearance    Other postprocedural status  Fixation hardware in foot LEFT    Stented coronary artery  10/18 heart attack  INFERIOR WALL MI    S/P CABG x 1  2018    S/P TKR (total knee replacement), right  with infection Mrsa   per pt he was cleared from MRSA infection    Surgery, elective  right knee wound debridement    History of open reduction and internal fixation (ORIF) procedure    H/O shoulder surgery  right    AICD (automatic cardioverter/defibrillator) present    Cholecystostomy care  drain inserted 2020 &amp; removed 4 months ago    History of tonsillectomy    History of hip replacement, total, right        Current Nutrition Order:  [employer] diet    PO Intake: Good (%) [   ]  Fair (50-75%) [   ] Poor (<25%) [   ] --Please see Below     GI Issues:   +BM 1/22     Pain:  None per flow sheets   Pain Management following     Skin:   Freddie Sagastume  No pressure ulcers; SX site noted s/p AKA, +IAD   1+ Dependant edema         Labs:   01-23    136  |  104  |  18  ----------------------------<  179<H>  4.6   |  24  |  1.19    Ca    8.4      23 Jan 2022 11:04  Phos  3.1     01-23  Mg     1.9     01-23      CAPILLARY BLOOD GLUCOSE      POCT Blood Glucose.: 181 mg/dL (24 Jan 2022 06:46)  POCT Blood Glucose.: 227 mg/dL (23 Jan 2022 21:59)  POCT Blood Glucose.: 117 mg/dL (23 Jan 2022 17:08)  POCT Blood Glucose.: 107 mg/dL (23 Jan 2022 12:46)      Medications:  MEDICATIONS  (STANDING):  acetaminophen     Tablet .. 975 milliGRAM(s) Oral every 8 hours  aspirin enteric coated 81 milliGRAM(s) Oral daily  atorvastatin 80 milliGRAM(s) Oral at bedtime  chlorhexidine 2% Cloths 1 Application(s) Topical <User Schedule>  DAPTOmycin IVPB 600 milliGRAM(s) IV Intermittent every 24 hours  dextrose 40% Gel 15 Gram(s) Oral once  dextrose 5%. 1000 milliLiter(s) (50 mL/Hr) IV Continuous <Continuous>  dextrose 5%. 1000 milliLiter(s) (100 mL/Hr) IV Continuous <Continuous>  dextrose 50% Injectable 25 Gram(s) IV Push once  dextrose 50% Injectable 12.5 Gram(s) IV Push once  dextrose 50% Injectable 25 Gram(s) IV Push once  DULoxetine 20 milliGRAM(s) Oral two times a day  gabapentin 300 milliGRAM(s) Oral three times a day  glucagon  Injectable 1 milliGRAM(s) IntraMuscular once  heparin   Injectable 5000 Unit(s) SubCutaneous every 8 hours  insulin glargine Injectable (LANTUS) 25 Unit(s) SubCutaneous at bedtime  insulin lispro (ADMELOG) corrective regimen sliding scale   SubCutaneous Before meals and at bedtime  insulin lispro Injectable (ADMELOG) 8 Unit(s) SubCutaneous three times a day before meals  levothyroxine 25 MICROGram(s) Oral daily  pantoprazole    Tablet 40 milliGRAM(s) Oral before breakfast  prasugrel 10 milliGRAM(s) Oral daily  senna 2 Tablet(s) Oral at bedtime  spironolactone 25 milliGRAM(s) Oral daily    MEDICATIONS  (PRN):  cyclobenzaprine 5 milliGRAM(s) Oral three times a day PRN Muscle Spasm  HYDROmorphone  Injectable 0.5 milliGRAM(s) IV Push every 4 hours PRN Breakthrough pain  oxyCODONE    IR 10 milliGRAM(s) Oral every 4 hours PRN Severe Pain (7 - 10)  oxyCODONE    IR 5 milliGRAM(s) Oral every 4 hours PRN Moderate Pain (4 - 6)  simethicone 80 milliGRAM(s) Chew daily PRN Gas  sodium chloride 0.9% lock flush 10 milliLiter(s) IV Push every 1 hour PRN Pre/post blood products, medications, blood draw, and to maintain line patency            6'1''  pounds+-10%  Weight 214 pounds, BMI 28.2  AKA: Adjusted BW body weight s/p AKA 154lbs (based off 16% adjustment)  >>%VAR=112%     Weight Change: Wts on down Trend since admission, assume d/t s/p AKA   1/22 179.8 pounds  1/20 185.9 pounds   1/17 173.2 pounds  1/14 165.3 pounds   1/13 171 pounds     Estimated energy needs:   Adjusted body weight for EER as %ABW>120%   EER adjusted based Age, Skin/pressure ulcer; Fluid needs per team     Estimated Energy Needs:  · Weight (lbs)	154 lb  · Weight (kg)	69.8 kg  · Enter From (leonard/kg)	30  · Enter To (leonard/kg)	35  · Calculated From (leonard/kg)	2094  · Calculated To (leonard/kg)	2443     Estimated Protein Needs:  · Weight (lbs)	154 lb  · Weight (kg)	69.8 kg  · Enter From (g/kg)	1.2  · Enter To (g/kg)	1.4  · Calculated From (g/kg)	83.76  · Calculated To (g/kg)	97.72      Subjective: 66M h/o uncontrolled T2DM with neuropathy, CAD s/p MIDCAB/VERONICA, HFrEF, AICD (2/2020), 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 Initially underwent antibiotic cement spacer exchange, I&D and revision gastroc flap by PRS 1/6. Bacteremia cleared since 1/6. Now s/p AKA 1/10 - source control achieved. Gallium scan negative for ICD infection and JOHN PAUL neg for vegetation on valve and leads. Had hypotensive episode in OR 1/14 and AKA closure postponed, likely due to volume depletion per SICU team. Pt required pressors, procedure aborted, kept intubated and sent to SICU. Extubated in SICU on 1/14 and then SDU. S/p AKA closure 1/18. D/C is Pending AUTH.     Pt remains on 5UR. Seen by SLP on 1/11/2021 following a failed JOHN PAUL by not being able to pass probe r/t xerostomia 2/2 prolonged NPO status. SLP rec regular solids/thin liquids. RN reports pt eating well at this time. Pt reports many  issues during admission and overall being unhappy with [facility] menu and [employer] diet. Reports ordering mac and cheese, pizza, and steak sandwiches via [employer]. Slight education on RD initial visit with attempted again today (need for [employer] diet in setting of healing), pt overall unwilling for education today, reports he is aware of diet despite not following as well 2/2 using food as comfort. Endo is following. Last 3 POCT 181 227 117, glucose 179. Asking for Glucerna for additional protein in post op setting-does take PTA 1-2/day.   Please see below for nutritions recommendations. Paged team.     Prior PES: Limited adherence to nutrition-related recommendation Etiology r/t unwilling/disinterest in learning/applying information Signs/Symptoms altered lab data: A1C: 9.0%, POCT glu:185 Glu: 194 and refusal of heart healthy diet education.   >> on going at this time   Goal/Expected Outcome Pt to comply to diabetic/heart healthy diet to maintain blood glucose control/CHF.    Recommendations:  1. Consistent Carbohydrate with evening snack diet, glucerna x1/day 220kcal/10gm prot per 1 can; PO cons per SLP.  2. Monitor %PO intake, diet tolerance.  >> Should pt be noted with s/s of issues swallowing, recommend NPO/SLP.  3. Labs: monitor BMP, CBC, glucose, lytes, trend renal indices, POCT.  4. Monitor Skin, Wts, Pain, GI.  5. MVI daily, Vit C 500mg/day.   6. RD to remain available for additional nutrition interventions as needed.     Education: Yes 1/24-Please see Above.     Risk Level: High [  x ] Moderate [   ] Low [   ].
Patient seen at bedside by Vascular PA and resident.     Patient is refusing all lab draws including vancomycin trough. He was explained the risks of not getting labs drawn including worsening of infection and even death. Patient voiced his understanding and still refused any blood draws, stating that "he needs a break for today".
Admitting Diagnosis:   Patient is a 66y old  Male who presents with a chief complaint of R Knee Pain (17 Jan 2022 08:50)      PAST MEDICAL & SURGICAL HISTORY:  Status post percutaneous transluminal coronary angioplasty  2 stents    Atherosclerosis of coronary artery  CAD (coronary artery disease)    Osteoarthritis    HLD (hyperlipidemia)    Blood clot due to device, implant, or graft  was on blood thinners    Diabetes  on insulin pump    HTN (hypertension)    CHF (congestive heart failure)  EF ~ 25%    History of celiac disease    Diverticulitis    STEMI (ST elevation myocardial infarction)    Diabetic neuropathy    Anxiety and depression    Preoperative clearance    Other postprocedural status  Fixation hardware in foot LEFT    Stented coronary artery  10/18 heart attack  INFERIOR WALL MI    S/P CABG x 1  2018    S/P TKR (total knee replacement), right  with infection Mrsa   per pt he was cleared from MRSA infection    Surgery, elective  right knee wound debridement    History of open reduction and internal fixation (ORIF) procedure    H/O shoulder surgery  right    AICD (automatic cardioverter/defibrillator) present    Cholecystostomy care  drain inserted 2020 &amp; removed 4 months ago    History of tonsillectomy    History of hip replacement, total, right        Current Nutrition Order:  NPO@12 1/17  CONSCHO diet    PO Intake: Good (%) [   ]  Fair (50-75%) [   ] Poor (<25%) [   ] --Please see Below     GI Issues:   1/16 Loja removed in AM, passed TOV  +BM 1/17   Ordered for senna Protonix and myoclin     Pain:  none per flow sheets     Skin:   Freddie 13  R Buttocks stage II pressure ulcer  No edema       Labs:   01-17    135  |  98  |  18  ----------------------------<  205<H>  4.1   |  26  |  1.30    Ca    8.6      17 Jan 2022 06:11  Phos  3.6     01-17  Mg     1.8     01-17      CAPILLARY BLOOD GLUCOSE      POCT Blood Glucose.: 97 mg/dL (17 Jan 2022 11:28)  POCT Blood Glucose.: 193 mg/dL (17 Jan 2022 06:44)  POCT Blood Glucose.: 161 mg/dL (16 Jan 2022 21:34)  POCT Blood Glucose.: 322 mg/dL (16 Jan 2022 16:48)      Medications:  MEDICATIONS  (STANDING):  aspirin enteric coated 81 milliGRAM(s) Oral daily  atorvastatin 80 milliGRAM(s) Oral at bedtime  chlorhexidine 2% Cloths 1 Application(s) Topical <User Schedule>  DAPTOmycin IVPB 600 milliGRAM(s) IV Intermittent every 24 hours  dextrose 40% Gel 15 Gram(s) Oral once  dextrose 5%. 1000 milliLiter(s) (50 mL/Hr) IV Continuous <Continuous>  dextrose 5%. 1000 milliLiter(s) (100 mL/Hr) IV Continuous <Continuous>  dextrose 50% Injectable 25 Gram(s) IV Push once  dextrose 50% Injectable 12.5 Gram(s) IV Push once  dextrose 50% Injectable 25 Gram(s) IV Push once  DULoxetine 20 milliGRAM(s) Oral two times a day  gabapentin 200 milliGRAM(s) Oral three times a day  glucagon  Injectable 1 milliGRAM(s) IntraMuscular once  heparin   Injectable 5000 Unit(s) SubCutaneous every 8 hours  insulin glargine Injectable (LANTUS) 40 Unit(s) SubCutaneous at bedtime  insulin lispro (ADMELOG) corrective regimen sliding scale   SubCutaneous Before meals and at bedtime  insulin lispro Injectable (ADMELOG) 9 Unit(s) SubCutaneous three times a day before meals  levothyroxine 25 MICROGram(s) Oral daily  pantoprazole    Tablet 40 milliGRAM(s) Oral before breakfast  senna 2 Tablet(s) Oral at bedtime  simethicone 80 milliGRAM(s) Chew daily    MEDICATIONS  (PRN):  acetaminophen     Tablet .. 650 milliGRAM(s) Oral every 6 hours PRN Moderate Pain (4 - 6)  oxycodone    5 mG/acetaminophen 325 mG 1 Tablet(s) Oral every 6 hours PRN Severe Pain (7 - 10)  sodium chloride 0.9% lock flush 10 milliLiter(s) IV Push every 1 hour PRN Pre/post blood products, medications, blood draw, and to maintain line patency      6'1''  pounds+-10%  Weight 214 pounds, BMI 28.2  AKA: Adjusted BW body weight s/p AKA 154lbs (based off 16% adjustment)  >>%AID=245%     Weight Change: Wts on down Trend since admission, assume d/t s/p AKA   1/17 173.2 pounds  1/14 165.3 pounds   1/13 171 pounds     Estimated energy needs:   Adjusted body weight for EER as %ABW>120%   EER adjusted based Age, Skin/pressure ulcer; Fluid needs per team     Estimated Energy Needs:  · Weight (lbs)	154 lb  · Weight (kg)	69.8 kg  · Enter From (leonard/kg)	30  · Enter To (leonard/kg)	35  · Calculated From (leonard/kg)	2094  · Calculated To (leonard/kg)	2443     Estimated Protein Needs:  · Weight (lbs)	154 lb  · Weight (kg)	69.8 kg  · Enter From (g/kg)	1.2  · Enter To (g/kg)	1.4  · Calculated From (g/kg)	83.76  · Calculated To (g/kg)	97.72      Subjective: 66M h/o uncontrolled T2DM with neuropathy, CAD s/p MIDCAB/VERONICA, HFrEF, AICD (2/2020), 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 Initially underwent antibiotic cement spacer exchange, I&D and revision gastroc flap by PRS 1/6. Bacteremia cleared since 1/6. Now s/p AKA 1/10 - source control achieved. Gallium scan negative for ICD infection and JOHN PAUL neg for vegetation on valve and leads. Had hypotensive episode in OR 1/14 and AKA closure postponed, likely due to volume depletion per SICU team. Pt required pressors, procedure aborted, kept intubated and sent to SICU. Extubated in SICU on 1/14 and then SDU. Plan for AKA closure tomorrow or Tuesday.     Spoke with RN on 5UR. /71. Pt sleeping this AM during RD visit and RN requesting for pt to be left to sleep. Seen by SLP on 1/11/2021 following a failed JOHN PAUL by not being able to pass probe r/t xerostomia 2/2 prolonged NPO status. SLP rec regular solids/thin liquids. Pt consumed some breakfast this AM, however unclear how much 2/2 pt wanting to sleep. Plan for NPO@12 tonight for Pending AKA closure 1/18.   Please see below for nutritions recommendations. Paged team.     Prior PES:  limited adherence to nutrition-related recommendation Etiology r/t unwilling/disinterest in learning/applying information Signs/Symptoms altered lab data: A1C: 9.0%, POCT glu:185 Glu: 194 and refusal of heart healthy diet education.   Goal/Expected Outcome Pt to comply to diabetic/heart healthy diet to maintain blood glucose control/CHF.    Recommendations:  1. Consistent Carbohydrate with evening snack diet; PO cons per SLP.  2. Monitor %PO intake, diet tolerance.  >> Consider ONS if PO intake falls below 50% of meals.   >> Should pt be noted with s/s of issues swallowing, recommend NPO/SLP.  3. Labs: monitor BMP, CBC, glucose, lytes, trend renal indices, POCT.  4. Monitor Skin, Wts, Pain, GI.  5. MVI daily, Vit C 500mg/day.   6. RD to remain available for additional nutrition interventions as needed.     Education: NA at this time, of note education provided during RD IA. Will attempt on f/u.     Risk Level: High [  x ] Moderate [   ] Low [   ]

## 2022-01-24 NOTE — PROGRESS NOTE ADULT - SUBJECTIVE AND OBJECTIVE BOX
INTERVAL HPI/OVERNIGHT EVENTS:    Patient is a 66y old  Male who presents with a chief complaint of R Knee Pain (24 Jan 2022 12:32)  Patient reports poor appetite, has been picking at the hospital food. Did eat dinner that daughter brought for him, reports having pizza last night and some home made food.   Reports he did not eat much breakfast , lunch fingerstick was 88, ate a granola bar for lunch to help the sugar    Pt reports the following symptoms:    CONSTITUTIONAL:  Negative fever or chills, feels well, good appetite  EYES:  Negative  blurry vision or double vision  CARDIOVASCULAR:  Negative for chest pain or palpitations  RESPIRATORY:  Negative for cough, wheezing, or SOB   GASTROINTESTINAL:  Negative for nausea, vomiting, diarrhea, constipation, or abdominal pain  GENITOURINARY:  Negative frequency, urgency or dysuria  NEUROLOGIC:  No headache, confusion, dizziness, lightheadedness    MEDICATIONS  (STANDING):  acetaminophen     Tablet .. 975 milliGRAM(s) Oral every 8 hours  aspirin enteric coated 81 milliGRAM(s) Oral daily  atorvastatin 80 milliGRAM(s) Oral at bedtime  chlorhexidine 2% Cloths 1 Application(s) Topical <User Schedule>  DAPTOmycin IVPB 600 milliGRAM(s) IV Intermittent every 24 hours  dextrose 40% Gel 15 Gram(s) Oral once  dextrose 5%. 1000 milliLiter(s) (50 mL/Hr) IV Continuous <Continuous>  dextrose 5%. 1000 milliLiter(s) (100 mL/Hr) IV Continuous <Continuous>  dextrose 50% Injectable 25 Gram(s) IV Push once  dextrose 50% Injectable 12.5 Gram(s) IV Push once  dextrose 50% Injectable 25 Gram(s) IV Push once  DULoxetine 20 milliGRAM(s) Oral two times a day  gabapentin 300 milliGRAM(s) Oral three times a day  glucagon  Injectable 1 milliGRAM(s) IntraMuscular once  heparin   Injectable 5000 Unit(s) SubCutaneous every 8 hours  insulin glargine Injectable (LANTUS) 28 Unit(s) SubCutaneous at bedtime  insulin lispro (ADMELOG) corrective regimen sliding scale   SubCutaneous Before meals and at bedtime  insulin lispro Injectable (ADMELOG) 8 Unit(s) SubCutaneous three times a day before meals  levothyroxine 25 MICROGram(s) Oral daily  multivitamin 1 Tablet(s) Oral daily  pantoprazole    Tablet 40 milliGRAM(s) Oral before breakfast  prasugrel 10 milliGRAM(s) Oral daily  senna 2 Tablet(s) Oral at bedtime  spironolactone 25 milliGRAM(s) Oral daily    MEDICATIONS  (PRN):  cyclobenzaprine 5 milliGRAM(s) Oral three times a day PRN Muscle Spasm  HYDROmorphone  Injectable 0.5 milliGRAM(s) IV Push every 4 hours PRN Breakthrough pain  oxyCODONE    IR 5 milliGRAM(s) Oral every 4 hours PRN Moderate Pain (4 - 6)  oxyCODONE    IR 10 milliGRAM(s) Oral every 4 hours PRN Severe Pain (7 - 10)  simethicone 80 milliGRAM(s) Chew daily PRN Gas  sodium chloride 0.9% lock flush 10 milliLiter(s) IV Push every 1 hour PRN Pre/post blood products, medications, blood draw, and to maintain line patency      PHYSICAL EXAM  Vital Signs Last 24 Hrs  T(C): 36.2 (24 Jan 2022 18:45), Max: 36.4 (23 Jan 2022 22:36)  T(F): 97.2 (24 Jan 2022 18:45), Max: 97.6 (23 Jan 2022 22:36)  HR: 85 (24 Jan 2022 16:13) (76 - 88)  BP: 104/59 (24 Jan 2022 16:13) (104/59 - 151/70)  BP(mean): --  RR: 18 (24 Jan 2022 16:13) (16 - 18)  SpO2: 96% (24 Jan 2022 16:13) (96% - 100%)    Constitutional: NAD.   HEENT: NCAT, MMM, OP clear, EOMI, , no proptosis or lid retraction  Neck: no thyromegaly or palpable thyroid nodules   Respiratory: lungs CTAB.  Cardiovascular: regular rhythm, normal S1 and S2, no audible murmurs, no peripheral edema  GI: soft, NT/ND, no masses/HSM appreciated.  Neurology: no tremors, DTR 2+  Skin: no visible rashes, dressing and ace wrap noted,  R AKA  Psychiatric: AAO x 3, depressed     LABS:                        9.0    8.25  )-----------( 465      ( 23 Jan 2022 11:04 )             30.8     01-23    136  |  104  |  18  ----------------------------<  179<H>  4.6   |  24  |  1.19    Ca    8.4      23 Jan 2022 11:04  Phos  3.1     01-23  Mg     1.9     01-23          Thyroid Stimulating Hormone, Serum: 2.850 uIU/mL (01-08 @ 08:25)  Thyroid Stimulating Hormone, Serum: 1.68 uIU/mL (03-12 @ 17:00)      HbA1C:         Insulin Sliding Scale requirements X 24 Hours:      RADIOLOGY & ADDITIONAL TESTS:      Assessment and Plan:   · Assessment	  66M hx of CAD with CABG , CHF (EF 20-25%) with AICD, T2DM c/b neuropathy with hx of diabetic ulcer, HLD. HTN, COPD, diverticulitis, celiac disease, anxiety and depression, cholecystostomy, R hip replacement 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 Castro (explant and abx spacer exchange), transferred here from [facility] for recurrent PJI for repeat spacer exchange and possible flap coverage with Dr. Vaughn/Dr. Bowen. Pt has been experiencing atraumatic worsening R knee pain x 1 month. Notes he went to a procedure center where his knee was aspirated 3 weeks ago. Pt was seen at [facility] yesterday where his knee was aspirated with 283k cell count. WBC 14, ESR 78, +, AVSS. Pt started on vancomycin + rifampin. 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 Now s/p Replacement, antibiotic spacer 06-Jan-2022. Also with hyperglycemia.  now s/p R AKA on vascular service     A1C: 9.0%  Weight: 97kg BMI 28  Cr/GFR: 0.9/104  EF: 20-25%     Problem/Plan - 1:  ·  Problem: Type 2 diabetes mellitus.   ·  Plan: Please increase lantus  to 28 units at night .  Please keep lispro to 8  units before each meal.  Please continue lispro moderate dose sliding scale four times daily with meals and at bedtime    Pt's fingerstick glucose goal is 100-180.    Will continue to monitor     For discharge, pt can continue...TBD    Pt can follow up at discharge with Dr Schmitt at scheduled appt. 2/8.  Will discuss case with Dr. Luevano    and update primary team.     Problem/Plan - 2:  ·  Problem: Hypothyroidism.   ·  Plan: TSH 4-5 past several years, not on therapy. Nov 2021 - TSH 8.08, free T4 0.9; started on levothyroxine 25mcg daily.  continue levothyroxine 25mcg daily.  TSH:2.85  Ft4:0.941.

## 2022-01-24 NOTE — PROGRESS NOTE ADULT - ASSESSMENT
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  He is now s/p arthroscopic I&D in view of prior difficulties with his incision/wound and other co-morbidities.  Unfortunately, he was on cipro while this occurred - no organisms seen on initial gram stain.  Although infection at present may represent persistence from November (MRSA), cannot presume, particularly in view of ST compromise.  He has had extensive antibiotic exposure in the interim and has spent a long time on inpatient services.  He had transaminitis with rifampin in past - would not attempt to add unless Staph grows.   s/p right knee arthroscopic I and D, 7/17/2020  s/p right knee explant, 7/22/2020

## 2022-01-24 NOTE — PROGRESS NOTE ADULT - SUBJECTIVE AND OBJECTIVE BOX
INTERVAL HISTORY:  The patient is sedentary but has no dyspnea or chest discomfort.    MEDICATIONS:  MEDICATIONS  (STANDING):  acetaminophen     Tablet .. 975 milliGRAM(s) Oral every 8 hours  aspirin enteric coated 81 milliGRAM(s) Oral daily  atorvastatin 80 milliGRAM(s) Oral at bedtime  chlorhexidine 2% Cloths 1 Application(s) Topical <User Schedule>  DAPTOmycin IVPB 600 milliGRAM(s) IV Intermittent every 24 hours  dextrose 40% Gel 15 Gram(s) Oral once  dextrose 5%. 1000 milliLiter(s) (50 mL/Hr) IV Continuous <Continuous>  dextrose 5%. 1000 milliLiter(s) (100 mL/Hr) IV Continuous <Continuous>  dextrose 50% Injectable 25 Gram(s) IV Push once  dextrose 50% Injectable 12.5 Gram(s) IV Push once  dextrose 50% Injectable 25 Gram(s) IV Push once  DULoxetine 20 milliGRAM(s) Oral two times a day  gabapentin 300 milliGRAM(s) Oral three times a day  glucagon  Injectable 1 milliGRAM(s) IntraMuscular once  heparin   Injectable 5000 Unit(s) SubCutaneous every 8 hours  insulin glargine Injectable (LANTUS) 25 Unit(s) SubCutaneous at bedtime  insulin lispro (ADMELOG) corrective regimen sliding scale   SubCutaneous Before meals and at bedtime  insulin lispro Injectable (ADMELOG) 8 Unit(s) SubCutaneous three times a day before meals  levothyroxine 25 MICROGram(s) Oral daily  pantoprazole    Tablet 40 milliGRAM(s) Oral before breakfast  prasugrel 10 milliGRAM(s) Oral daily  senna 2 Tablet(s) Oral at bedtime  spironolactone 25 milliGRAM(s) Oral daily      REVIEW OF SYSTEMS:  CONSTITUTIONAL: No fever, no weight loss, no fatigue  PULMONARY: No cough, no wheezing, chills no hemoptysis; No Shortness of Breath  CARDIOVASCULAR: No chest pain, no palpitations, no syncope, dizziness, no leg swelling  GASTROINTESTINAL: No abdominal pain. No nausea, no  vomiting, no hematemesis; No diarrhea, no constipation. No melena, no hematochezia.  GENITOURINARY: No dysuria, no frequency, no hematuria, no incontinence  SKIN: No itching, no burning, no rashes, no lesions     PHYSICAL EXAM:  T(C): 36.2 (01-24-22 @ 09:39), Max: 37.1 (01-23-22 @ 14:25)  HR: 76 (01-24-22 @ 12:07) (76 - 88)  BP: 115/56 (01-24-22 @ 12:07) (114/61 - 156/59)  RR: 18 (01-24-22 @ 12:07) (16 - 18)  SpO2: 96% (01-24-22 @ 12:07) (96% - 100%)  Wt(kg): --  I&O's Summary    23 Jan 2022 07:01  -  24 Jan 2022 07:00  --------------------------------------------------------  IN: 460 mL / OUT: 1400 mL / NET: -940 mL    24 Jan 2022 07:01  -  24 Jan 2022 12:32  --------------------------------------------------------  IN: 180 mL / OUT: 300 mL / NET: -120 mL        Appearance:  No deformities, normal appearance	  HEENT:   Normal oral mucosa, PERRL, EOMI	  Neck: No jvd , no masses  Lymphatic: No  lymphadenopathy  Pulmonary: Lungs clear to auscultation and percussion  Cardiovascular: Normal S1 S2, No JVD, No  murmur, No edema	  Abdomen:  Soft, Non-tender, + BS  Skin: No rashes, No ecchymoses	  Extremities:  No clubbing or cyanosis  MSK: Normal range of motion, no joint swelling    LABS:		  CARDIAC MARKERS:                         9.0    8.25  )-----------( 465      ( 23 Jan 2022 11:04 )             30.8   01-23    136  |  104  |  18  ----------------------------<  179<H>  4.6   |  24  |  1.19    Ca    8.4      23 Jan 2022 11:04  Phos  3.1     01-23  Mg     1.9 01-23      proBNP:  Lipid Profile:  HgA1c:  TSH:      TELEMETRY: paced	      QTC     ECG:  	   QTC         RADIOLOGY:   DIAGNOSTIC TESTING: [ ] Echocardiogram: [ ]  Catheterization: [ ] Stress Test:    PMH, medications, allergies Chart notes, vital signs, laboratory data, and radiology studies reviewed.    ASSESSMENT/PLAN: 	  Congestive heart failure–patient has severely reduced ejection fraction.  He is comfortable and euvolemic.  On spironolactone.  Start metoprolol ER 25mg daily as in or outpatient.    Coronary artery disease–the patient is chest pain-free.  The patient had stent thrombosis.  The  prasugrel was restarted.     Defibrillator-The rhythm has been stable.     Diabetes–follow sugars.    Septic arthritis–status post AKA.  Afebrile with normal white blood count.  On antibiotics.      Discussed with nursing staff.

## 2022-01-25 LAB
GLUCOSE BLDC GLUCOMTR-MCNC: 100 MG/DL — HIGH (ref 70–99)
GLUCOSE BLDC GLUCOMTR-MCNC: 123 MG/DL — HIGH (ref 70–99)
GLUCOSE BLDC GLUCOMTR-MCNC: 177 MG/DL — HIGH (ref 70–99)
GLUCOSE BLDC GLUCOMTR-MCNC: 356 MG/DL — HIGH (ref 70–99)

## 2022-01-25 PROCEDURE — 99233 SBSQ HOSP IP/OBS HIGH 50: CPT

## 2022-01-25 RX ORDER — OXYCODONE HYDROCHLORIDE 5 MG/1
10 TABLET ORAL
Refills: 0 | Status: DISCONTINUED | OUTPATIENT
Start: 2022-01-25 | End: 2022-01-27

## 2022-01-25 RX ORDER — OXYCODONE HYDROCHLORIDE 5 MG/1
5 TABLET ORAL
Refills: 0 | Status: DISCONTINUED | OUTPATIENT
Start: 2022-01-25 | End: 2022-01-27

## 2022-01-25 RX ADMIN — SPIRONOLACTONE 25 MILLIGRAM(S): 25 TABLET, FILM COATED ORAL at 06:22

## 2022-01-25 RX ADMIN — SENNA PLUS 2 TABLET(S): 8.6 TABLET ORAL at 22:22

## 2022-01-25 RX ADMIN — HEPARIN SODIUM 5000 UNIT(S): 5000 INJECTION INTRAVENOUS; SUBCUTANEOUS at 22:21

## 2022-01-25 RX ADMIN — Medication 975 MILLIGRAM(S): at 14:45

## 2022-01-25 RX ADMIN — GABAPENTIN 300 MILLIGRAM(S): 400 CAPSULE ORAL at 06:21

## 2022-01-25 RX ADMIN — DULOXETINE HYDROCHLORIDE 20 MILLIGRAM(S): 30 CAPSULE, DELAYED RELEASE ORAL at 22:19

## 2022-01-25 RX ADMIN — OXYCODONE HYDROCHLORIDE 10 MILLIGRAM(S): 5 TABLET ORAL at 19:03

## 2022-01-25 RX ADMIN — Medication 975 MILLIGRAM(S): at 13:53

## 2022-01-25 RX ADMIN — Medication 975 MILLIGRAM(S): at 06:22

## 2022-01-25 RX ADMIN — INSULIN GLARGINE 28 UNIT(S): 100 INJECTION, SOLUTION SUBCUTANEOUS at 22:40

## 2022-01-25 RX ADMIN — Medication 10: at 12:32

## 2022-01-25 RX ADMIN — Medication 81 MILLIGRAM(S): at 11:17

## 2022-01-25 RX ADMIN — GABAPENTIN 300 MILLIGRAM(S): 400 CAPSULE ORAL at 13:53

## 2022-01-25 RX ADMIN — Medication 8 UNIT(S): at 08:44

## 2022-01-25 RX ADMIN — HEPARIN SODIUM 5000 UNIT(S): 5000 INJECTION INTRAVENOUS; SUBCUTANEOUS at 13:54

## 2022-01-25 RX ADMIN — OXYCODONE HYDROCHLORIDE 10 MILLIGRAM(S): 5 TABLET ORAL at 15:35

## 2022-01-25 RX ADMIN — Medication 2: at 06:52

## 2022-01-25 RX ADMIN — DAPTOMYCIN 124 MILLIGRAM(S): 500 INJECTION, POWDER, LYOPHILIZED, FOR SOLUTION INTRAVENOUS at 22:40

## 2022-01-25 RX ADMIN — HYDROMORPHONE HYDROCHLORIDE 0.5 MILLIGRAM(S): 2 INJECTION INTRAMUSCULAR; INTRAVENOUS; SUBCUTANEOUS at 01:46

## 2022-01-25 RX ADMIN — ATORVASTATIN CALCIUM 80 MILLIGRAM(S): 80 TABLET, FILM COATED ORAL at 22:19

## 2022-01-25 RX ADMIN — HYDROMORPHONE HYDROCHLORIDE 0.5 MILLIGRAM(S): 2 INJECTION INTRAMUSCULAR; INTRAVENOUS; SUBCUTANEOUS at 02:46

## 2022-01-25 RX ADMIN — Medication 8 UNIT(S): at 22:42

## 2022-01-25 RX ADMIN — PANTOPRAZOLE SODIUM 40 MILLIGRAM(S): 20 TABLET, DELAYED RELEASE ORAL at 06:22

## 2022-01-25 RX ADMIN — Medication 25 MICROGRAM(S): at 06:22

## 2022-01-25 RX ADMIN — PRASUGREL 10 MILLIGRAM(S): 5 TABLET, FILM COATED ORAL at 11:17

## 2022-01-25 RX ADMIN — DULOXETINE HYDROCHLORIDE 20 MILLIGRAM(S): 30 CAPSULE, DELAYED RELEASE ORAL at 11:17

## 2022-01-25 RX ADMIN — Medication 975 MILLIGRAM(S): at 07:20

## 2022-01-25 RX ADMIN — Medication 975 MILLIGRAM(S): at 22:20

## 2022-01-25 RX ADMIN — HEPARIN SODIUM 5000 UNIT(S): 5000 INJECTION INTRAVENOUS; SUBCUTANEOUS at 06:21

## 2022-01-25 RX ADMIN — Medication 1 TABLET(S): at 11:17

## 2022-01-25 RX ADMIN — OXYCODONE HYDROCHLORIDE 10 MILLIGRAM(S): 5 TABLET ORAL at 20:00

## 2022-01-25 RX ADMIN — Medication 8 UNIT(S): at 12:32

## 2022-01-25 RX ADMIN — CHLORHEXIDINE GLUCONATE 1 APPLICATION(S): 213 SOLUTION TOPICAL at 06:39

## 2022-01-25 RX ADMIN — OXYCODONE HYDROCHLORIDE 10 MILLIGRAM(S): 5 TABLET ORAL at 16:30

## 2022-01-25 RX ADMIN — GABAPENTIN 300 MILLIGRAM(S): 400 CAPSULE ORAL at 22:19

## 2022-01-25 NOTE — CONSULT NOTE ADULT - REASON FOR ADMISSION
R Knee Pain

## 2022-01-25 NOTE — CONSULT NOTE ADULT - CONSULT REASON
uncontrolled T2DM with hyperglycemia
Amputation
Hypotension
R Knee Pain
Right limb pain
hypotensive, resp failure.
MRSA bacteremia, R PJI
PM&R evaluation
vascular access
preop

## 2022-01-25 NOTE — PROGRESS NOTE ADULT - SUBJECTIVE AND OBJECTIVE BOX
O/N; VSS, ANAHI  1/23: awaiting AR. Dr. Sorenson called to evaluate- awaiting recommendations.  Endo increased lantus from 25 to 28.          ---------------------------------------------------------------------------  PLEASE CHECK WHEN PRESENT:  [ x ] Heart Failure  [  ] Acute  [  ] Acute on Chronic  [x  ] Chronic  -------------------------------------------------------------------  [  ]Diastolic [HFpEF]  [x  ]Systolic [HFrEF]  [  ]Combined [HFpEF & HFrEF]  [  ] afib  [x  ] hypertensive heart disease  [  ]Other:  -------------------------------------------------------------------  [ ] Respiratory failure  [ ] Acute cor pulmonale  [ ] Asthma/COPD Exacerbation  [ ] Pleural effusion  [ ] Aspiration pneumonia  -------------------------------------------------------------------  [  ]MOISES  [  ]ATN  [  ]Reneal Medullary Necrosis  [  ]Renal Cortical Necrosis  [  ]Other Pathological Lesions:    [  ]CKD 1  [  ]CKD 2  [  ]CKD 3  [  ]CKD 4  [  ]CKD 5  [  ]Other  -------------------------------------------------------------------  [ x ]Diabetes  [  ] Diabetic PVD Ulcer  [  ] Neuropathic ulcer to DM  [  ] Diabetes with Nephropathy  [  ] Osteomyelitis due to diabetes  --------------------------------------------------------------------  [  ]Malnutrition: See Nutrition Note  [  ]Cachexia  [  ]Other:   [  ]Supplement Ordered:  [  ]Morbid Obesity (BMI >=40]  ---------------------------------------------------------------------  [ ] Sepsis/severe sepsis/septic shock  [ ] UTI  [ ] Pneumonia  -----------------------------------------------------------------------  [ ] Acidosis/alkalosis  [ ] Fluid overload  [ ] Hypokalemia  [ ] Hyperkalemia  [ ] Hypomagnesemia  [ ] Hypophosphatemia  [ ] Hyperphosphatemia  ------------------------------------------------------------------------  [ ] Acute blood loss anemia  [ ] Post op blood loss anemia  [ ] Iron deficiency anemia  [ ] Anemia due to chronic disease  [ ] Hypercoagulable state  ----------------------------------------------------------------------  [ ] Cerebral infarction  [ ] Transient ischemia attack  [ ] Encephalopathy        A/P: 66y Male Hx CAD, CAD s/p MIDCAB/VERONICA 2018, AICD (2/2020), CHF (EF 15-20%), DM, 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 Castro (explant and abx spacer exchange), transferred here on 1/6 from [facility] for MRSA bacteremia due to recurrent PJI. He was taken to OR with ortho on Revision gastroc flap by PRS, antibiotic cement spacer removal, I&D, replacement on 1/6, then taken to OR with vascular on 1/10 for right AKA. taken to OR for completion AKA today, but hypotensive on induction, required pressors, procedure aborted, kept intubated and sent to SICU. Extubated in SICU on 1/14 and stepped down. 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   - abx duration 4 weeks Dapto (EOT2/7)    DM:  -ISS  -Lantus 25, lispro 10TID  appreciate endo recs     Diet:   Consistent carb    Activity:   -PT/OT consult  - anticipate acute rehab placement     DVTPPx: HSQ    Dispo:  pending Acute rehab placement  O/N; VSS, ANAHI  1/23: awaiting AR. Dr. Sorenson called to evaluate- awaiting recommendations.  Endo increased lantus from 25 to 28.      SUBJECTIVE: Patient seen at bedside in no acute distress, No CP, SOB, fevers or chills     Vital Signs Last 24 Hrs  T(C): 36.4 (25 Jan 2022 06:30), Max: 37 (24 Jan 2022 22:06)  T(F): 97.6 (25 Jan 2022 06:30), Max: 98.6 (24 Jan 2022 22:06)  HR: 80 (25 Jan 2022 00:15) (76 - 85)  BP: 108/68 (25 Jan 2022 00:15) (104/59 - 125/56)  BP(mean): --  RR: 16 (25 Jan 2022 00:15) (16 - 18)  SpO2: 95% (25 Jan 2022 00:15) (95% - 96%)    Physical Exam:  General: No acute distress, resting comfortably in bed  C/V: paced rhythm   Pulm: Nonlabored breathing, no respiratory distress  Abd: soft, non-tender, non-distended.  Extrem: warm and well perfused, no edema. RLE AKA site without erythema, tenderness, or hematoma. Stitches and staples in place.     Lines/drains/tubes:    I&O's Summary    24 Jan 2022 07:01  -  25 Jan 2022 07:00  --------------------------------------------------------  IN: 540 mL / OUT: 2080 mL / NET: -1540 mL        LABS:                        9.0    8.25  )-----------( 465      ( 23 Jan 2022 11:04 )             30.8     01-23    136  |  104  |  18  ----------------------------<  179<H>  4.6   |  24  |  1.19    Ca    8.4      23 Jan 2022 11:04  Phos  3.1     01-23  Mg     1.9     01-23          CAPILLARY BLOOD GLUCOSE      POCT Blood Glucose.: 177 mg/dL (25 Jan 2022 06:48)  POCT Blood Glucose.: 132 mg/dL (24 Jan 2022 21:46)  POCT Blood Glucose.: 114 mg/dL (24 Jan 2022 16:29)  POCT Blood Glucose.: 88 mg/dL (24 Jan 2022 12:16)        RADIOLOGY & ADDITIONAL STUDIES:          ---------------------------------------------------------------------------  PLEASE CHECK WHEN PRESENT:  [ x ] Heart Failure  [  ] Acute  [  ] Acute on Chronic  [x  ] Chronic  -------------------------------------------------------------------  [  ]Diastolic [HFpEF]  [x  ]Systolic [HFrEF]  [  ]Combined [HFpEF & HFrEF]  [  ] afib  [x  ] hypertensive heart disease  [  ]Other:  -------------------------------------------------------------------  [ ] Respiratory failure  [ ] Acute cor pulmonale  [ ] Asthma/COPD Exacerbation  [ ] Pleural effusion  [ ] Aspiration pneumonia  -------------------------------------------------------------------  [  ]MOISES  [  ]ATN  [  ]Reneal Medullary Necrosis  [  ]Renal Cortical Necrosis  [  ]Other Pathological Lesions:    [  ]CKD 1  [  ]CKD 2  [  ]CKD 3  [  ]CKD 4  [  ]CKD 5  [  ]Other  -------------------------------------------------------------------  [ x ]Diabetes  [  ] Diabetic PVD Ulcer  [  ] Neuropathic ulcer to DM  [  ] Diabetes with Nephropathy  [  ] Osteomyelitis due to diabetes  --------------------------------------------------------------------  [  ]Malnutrition: See Nutrition Note  [  ]Cachexia  [  ]Other:   [  ]Supplement Ordered:  [  ]Morbid Obesity (BMI >=40]  ---------------------------------------------------------------------  [ ] Sepsis/severe sepsis/septic shock  [ ] UTI  [ ] Pneumonia  -----------------------------------------------------------------------  [ ] Acidosis/alkalosis  [ ] Fluid overload  [ ] Hypokalemia  [ ] Hyperkalemia  [ ] Hypomagnesemia  [ ] Hypophosphatemia  [ ] Hyperphosphatemia  ------------------------------------------------------------------------  [ ] Acute blood loss anemia  [ ] Post op blood loss anemia  [ ] Iron deficiency anemia  [ ] Anemia due to chronic disease  [ ] Hypercoagulable state  ----------------------------------------------------------------------  [ ] Cerebral infarction  [ ] Transient ischemia attack  [ ] Encephalopathy        A/P: 66y Male Hx CAD, CAD s/p MIDCAB/VERONICA 2018, AICD (2/2020), CHF (EF 15-20%), DM, 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 Castro (explant and abx spacer exchange), transferred here on 1/6 from [facility] for MRSA bacteremia due to recurrent PJI. He was taken to OR with ortho on Revision gastroc flap by PRS, antibiotic cement spacer removal, I&D, replacement on 1/6, then taken to OR with vascular on 1/10 for right AKA. taken to OR for completion AKA today, but hypotensive on induction, required pressors, procedure aborted, kept intubated and sent to SICU. Extubated in SICU on 1/14 and stepped down. 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   - abx duration 4 weeks Dapto (EOT2/7)    DM:  -ISS  -Lantus 25, lispro 10TID  appreciate endo recs     Diet:   Consistent carb    Activity:   -PT/OT consult  - anticipate acute rehab placement     DVTPPx: HSQ    Dispo:  pending Acute rehab placement

## 2022-01-25 NOTE — CONSULT NOTE ADULT - PROVIDER SPECIALTY LIST ADULT
SICU
Endocrinology
Cardiology
Pain Medicine
Pain Medicine
Infectious Disease
Rehab Medicine
Vascular Surgery
Intervent Radiology
Internal Medicine

## 2022-01-25 NOTE — CONSULT NOTE ADULT - CONSULT REQUESTED BY NAME
Dr Vaughn
Ortho
Dr. Malloy
Dr. Vaughn
Vascular Surgery Service
vascular
Dr. James Vaughn
[facility] [place] /   Vascular Surgery
vascular surgery
ortho

## 2022-01-25 NOTE — PROGRESS NOTE ADULT - SUBJECTIVE AND OBJECTIVE BOX
INTERVAL HISTORY:  The patient has been able to on occasion do therapy for 30 to 45 minutes.  He has no complaints of shortness of breath or chest discomfort.    MEDICATIONS:  MEDICATIONS  (STANDING):  acetaminophen     Tablet .. 975 milliGRAM(s) Oral every 8 hours  aspirin enteric coated 81 milliGRAM(s) Oral daily  atorvastatin 80 milliGRAM(s) Oral at bedtime  chlorhexidine 2% Cloths 1 Application(s) Topical <User Schedule>  DAPTOmycin IVPB 600 milliGRAM(s) IV Intermittent every 24 hours  dextrose 40% Gel 15 Gram(s) Oral once  dextrose 5%. 1000 milliLiter(s) (50 mL/Hr) IV Continuous <Continuous>  dextrose 5%. 1000 milliLiter(s) (100 mL/Hr) IV Continuous <Continuous>  dextrose 50% Injectable 25 Gram(s) IV Push once  dextrose 50% Injectable 12.5 Gram(s) IV Push once  dextrose 50% Injectable 25 Gram(s) IV Push once  DULoxetine 20 milliGRAM(s) Oral two times a day  gabapentin 300 milliGRAM(s) Oral three times a day  glucagon  Injectable 1 milliGRAM(s) IntraMuscular once  heparin   Injectable 5000 Unit(s) SubCutaneous every 8 hours  insulin glargine Injectable (LANTUS) 28 Unit(s) SubCutaneous at bedtime  insulin lispro (ADMELOG) corrective regimen sliding scale   SubCutaneous Before meals and at bedtime  insulin lispro Injectable (ADMELOG) 8 Unit(s) SubCutaneous three times a day before meals  levothyroxine 25 MICROGram(s) Oral daily  multivitamin 1 Tablet(s) Oral daily  pantoprazole    Tablet 40 milliGRAM(s) Oral before breakfast  prasugrel 10 milliGRAM(s) Oral daily  senna 2 Tablet(s) Oral at bedtime  spironolactone 25 milliGRAM(s) Oral daily      REVIEW OF SYSTEMS:  CONSTITUTIONAL: No fever, no weight loss, no fatigue  PULMONARY: No cough, no wheezing, chills no hemoptysis; No Shortness of Breath  CARDIOVASCULAR: No chest pain, no palpitations, no syncope, dizziness, no leg swelling  GASTROINTESTINAL: No abdominal pain. No nausea, no  vomiting, no hematemesis; No diarrhea, no constipation. No melena, no hematochezia.  GENITOURINARY: No dysuria, no frequency, no hematuria, no incontinence  SKIN: No itching, no burning, no rashes, no lesions     PHYSICAL EXAM:  T(C): 35.8 (01-25-22 @ 09:42), Max: 37 (01-24-22 @ 22:06)  HR: 72 (01-25-22 @ 09:34) (72 - 86)  BP: 101/57 (01-25-22 @ 09:34) (101/57 - 118/64)  RR: 16 (01-25-22 @ 09:34) (16 - 18)  SpO2: 100% (01-25-22 @ 09:34) (95% - 100%)  Wt(kg): --  I&O's Summary    24 Jan 2022 07:01  -  25 Jan 2022 07:00  --------------------------------------------------------  IN: 540 mL / OUT: 2080 mL / NET: -1540 mL    25 Jan 2022 07:01  -  25 Jan 2022 12:40  --------------------------------------------------------  IN: 210 mL / OUT: 0 mL / NET: 210 mL        Appearance:  No deformities, normal appearance	  HEENT:   Normal oral mucosa, PERRL, EOMI	  Neck: No jvd , no masses  Lymphatic: No  lymphadenopathy  Pulmonary: Lungs clear to auscultation and percussion  Cardiovascular: Normal S1 S2, No JVD, No  murmur, No edema	  Abdomen:  Soft, Non-tender, + BS  Skin: No rashes, No ecchymoses	  Extremities:  No clubbing or cyanosis, R AKA  MSK: Normal range of motion, no joint swelling    LABS:		  CARDIAC MARKERS:         proBNP:  Lipid Profile:  HgA1c:  TSH:      TELEMETRY: 	      QTC     ECG:  	   QTC         RADIOLOGY:   DIAGNOSTIC TESTING: [ ] Echocardiogram: [ ]  Catheterization: [ ] Stress Test:    PMH, medications, allergies Chart notes, vital signs, laboratory data, and radiology studies reviewed.    ASSESSMENT/PLAN: 	  ongestive heart failure–patient has a reduced ejection fraction.  He is comfortable without edema and there is no clinical CHF.  He is on only a mild diuretic, spironolactone.  Start metoprolol ER 25mg daily as in or outpatient. He is limited by his amputation but he has been able to do prolonged therapy.     Coronary artery disease–the patient is chest pain-free.  The patient had stent thrombosis.  The  prasugrel was restarted.     Defibrillator-The rhythm has been stable.     Diabetes–follow sugars.    Septic arthritis–status post AKA.  Afebrile with normal white blood count.  On antibiotics.      Discussed with resident.

## 2022-01-25 NOTE — CONSULT NOTE ADULT - ASSESSMENT
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 Castro (explant and abx spacer exchange), transferred here on 1/6 from [facility] for MRSA bacteremia due to recurrent PJI. He was taken to OR with ortho on Revision gastroc flap by PRS, antibiotic cement spacer removal, I&D, replacement on 1/6, then taken to OR with vascular on 1/10 for right AKA. taken to OR for completion AKA today, but hypotensive on induction, required pressors, procedure aborted, kept intubated and sent to SICU. Extubated in SICU on 1/14 and stepped down. 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   - abx duration 4 weeks Dapto (EOT2/7)    DM:  -ISS  -Lantus 25, lispro 10TID  appreciate endo recs     Diet:   Consistent carb    Activity:   -PT/OT consult  - anticipate acute rehab placement     DVTPPx: HSQ

## 2022-01-25 NOTE — CONSULT NOTE ADULT - SUBJECTIVE AND OBJECTIVE BOX
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 Castro (explant and abx spacer exchange), transferred here from [facility] for recurrent PJI for repeat spacer exchange and possible flap coverage with Dr. Vaughn/Dr. Bowen. Pt has been experiencing atraumatic worsening R knee pain x 1 month. Notes he went to a procedure center where his knee was aspirated 3 weeks ago. Pt was seen at [facility] yesterday where his knee was aspirated with 283k cell count. WBC 14, ESR 78, +, AVSS. Pt started on vancomycin + rifampin. 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 (06 Jan 2022 06:29)      PAST MEDICAL & SURGICAL HISTORY:  Status post percutaneous transluminal coronary angioplasty  2 stents    Atherosclerosis of coronary artery  CAD (coronary artery disease)    Osteoarthritis    HLD (hyperlipidemia)    Blood clot due to device, implant, or graft  was on blood thinners    Diabetes  on insulin pump    HTN (hypertension)    CHF (congestive heart failure)  EF ~ 25%    History of celiac disease    Diverticulitis    STEMI (ST elevation myocardial infarction)    Diabetic neuropathy    Anxiety and depression    Preoperative clearance    Other postprocedural status  Fixation hardware in foot LEFT    Stented coronary artery  10/18 heart attack  INFERIOR WALL MI    S/P CABG x 1  2018    S/P TKR (total knee replacement), right  with infection Mrsa   per pt he was cleared from MRSA infection    Surgery, elective  right knee wound debridement    History of open reduction and internal fixation (ORIF) procedure    H/O shoulder surgery  right    AICD (automatic cardioverter/defibrillator) present    Cholecystostomy care  drain inserted 2020 &amp; removed 4 months ago    History of tonsillectomy    History of hip replacement, total, right        MEDICATIONS  (STANDING):  acetaminophen     Tablet .. 975 milliGRAM(s) Oral every 8 hours  aspirin enteric coated 81 milliGRAM(s) Oral daily  atorvastatin 80 milliGRAM(s) Oral at bedtime  chlorhexidine 2% Cloths 1 Application(s) Topical <User Schedule>  DAPTOmycin IVPB 600 milliGRAM(s) IV Intermittent every 24 hours  dextrose 40% Gel 15 Gram(s) Oral once  dextrose 5%. 1000 milliLiter(s) (50 mL/Hr) IV Continuous <Continuous>  dextrose 5%. 1000 milliLiter(s) (100 mL/Hr) IV Continuous <Continuous>  dextrose 50% Injectable 25 Gram(s) IV Push once  dextrose 50% Injectable 12.5 Gram(s) IV Push once  dextrose 50% Injectable 25 Gram(s) IV Push once  DULoxetine 20 milliGRAM(s) Oral two times a day  gabapentin 300 milliGRAM(s) Oral three times a day  glucagon  Injectable 1 milliGRAM(s) IntraMuscular once  heparin   Injectable 5000 Unit(s) SubCutaneous every 8 hours  insulin glargine Injectable (LANTUS) 28 Unit(s) SubCutaneous at bedtime  insulin lispro (ADMELOG) corrective regimen sliding scale   SubCutaneous Before meals and at bedtime  insulin lispro Injectable (ADMELOG) 8 Unit(s) SubCutaneous three times a day before meals  levothyroxine 25 MICROGram(s) Oral daily  multivitamin 1 Tablet(s) Oral daily  pantoprazole    Tablet 40 milliGRAM(s) Oral before breakfast  prasugrel 10 milliGRAM(s) Oral daily  senna 2 Tablet(s) Oral at bedtime  spironolactone 25 milliGRAM(s) Oral daily    MEDICATIONS  (PRN):  cyclobenzaprine 5 milliGRAM(s) Oral three times a day PRN Muscle Spasm  HYDROmorphone  Injectable 0.5 milliGRAM(s) IV Push every 4 hours PRN Breakthrough pain  oxyCODONE    IR 5 milliGRAM(s) Oral every 4 hours PRN Moderate Pain (4 - 6)  oxyCODONE    IR 10 milliGRAM(s) Oral every 4 hours PRN Severe Pain (7 - 10)  simethicone 80 milliGRAM(s) Chew daily PRN Gas  sodium chloride 0.9% lock flush 10 milliLiter(s) IV Push every 1 hour PRN Pre/post blood products, medications, blood draw, and to maintain line patency        Social History:                -  Home Living Status:  lives with his wife in a private house, 5 steps to enter, 1 flight of stairs inside         -  Prior Home Care Services:   none, wife assisted prn         -  Family support: wife         -  Occupation: retired, 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    Baseline Functional Level Prior to Admission:             - ADL's/ IADL's:  with partial assist         - ambulatory status PTA:  minimal ambulation with home PT required assist with a walker, few steps at a time secondary to chronic left LE pain    FAMILY HISTORY:  Family history of heart disease (Mother)    Family history of allergies  daughter        CBC Full  -  ( 23 Jan 2022 11:04 )  WBC Count : 8.25 K/uL  RBC Count : 3.75 M/uL  Hemoglobin : 9.0 g/dL  Hematocrit : 30.8 %  Platelet Count - Automated : 465 K/uL  Mean Cell Volume : 82.1 fl  Mean Cell Hemoglobin : 24.0 pg  Mean Cell Hemoglobin Concentration : 29.2 gm/dL  Auto Neutrophil # : x  Auto Lymphocyte # : x  Auto Monocyte # : x  Auto Eosinophil # : x  Auto Basophil # : x  Auto Neutrophil % : x  Auto Lymphocyte % : x  Auto Monocyte % : x  Auto Eosinophil % : x  Auto Basophil % : x      01-23    136  |  104  |  18  ----------------------------<  179<H>  4.6   |  24  |  1.19    Ca    8.4      23 Jan 2022 11:04  Phos  3.1     01-23  Mg     1.9     01-23              Radiology:    < from: CT Lower Extremity No Cont, Right (01.06.22 @ 11:18) >  ACC: [id number] EXAM:  CT LWR EXT RT                          PROCEDURE DATE:  01/06/2022          INTERPRETATION:  INDICATION: Preoperative evaluation, right lower   extremity    TECHNIQUE: Axial images through the right lower extremity were obtained   without use of intravenous contrast, according to standard protocol.   Sagittal and coronal reformats were obtained from the primary axial data   set.    COMPARISON: 1/5/2022    FINDINGS:    Patient is status post gamma nail fixation of a remote proximal femoral   fracture, without visualized hardware complication. There is also a   resection arthroplasty of the knee with longstem rods noted within the   distal femoral and proximal tibial shafts. There is fracture of the   cement about the distal aspect of the femoral daniela and mild lucency   surrounding the cement spacer, for which some degree of mechanical   loosening cannot be entirely excluded. There is a small amount of gas and   fluid within the anterior joint, possibly reflecting sequelae of recent   instrumentation although sequelae of superimposed infection cannot be   entirely excluded. No areas of significant osseous erosion.    There is atherosclerotic disease. There is atrophy of the distal thigh   and calf musculature.      IMPRESSION:    Status post resection arthroplasty of the knee with fracture of the   cement surrounding the distal aspect of the femoral daniela. There is also   lucency surrounding the cement spacer, for which some degree of   mechanical loosening cannot be entirely excluded. Mild foci of air within   the joint space could reflect sequelae of recent instrumentation although   correlation with physical examination may be of utility to entirely   exclude superimposed infection, if there is clinical suspicion.    Status post gamma nail fixation of a remote proximal femoral fracture   without hardware complication..                  Vital Signs Last 24 Hrs  T(C): 36.4 (25 Jan 2022 06:30), Max: 37 (24 Jan 2022 22:06)  T(F): 97.6 (25 Jan 2022 06:30), Max: 98.6 (24 Jan 2022 22:06)  HR: 86 (25 Jan 2022 06:12) (76 - 86)  BP: 118/64 (25 Jan 2022 06:12) (104/59 - 121/77)  BP(mean): --  RR: 16 (25 Jan 2022 06:12) (16 - 18)  SpO2: 95% (25 Jan 2022 06:12) (95% - 96%)        REVIEW OF SYSTEMS:  per HPI        Physical Exam:  on contact MRSA isolation precautions, 65 yo gentleman lying in semi Henriquez's position, awake, alert, no acute complaints      Ext:   Left shin wound with gauze dressing          Left knee extension lag - 25 degrees (chronic) to 80 degrees flexion            R AK stump with compression ace wrap: normal hip range of motion                    Neurologic Exam:    Alert and oriented x 3       Motor Exam:    Right UE:             5/5    Left UE:               5/5      Right LE:             hip flexion/abduction/adduction 4/5    Left LE:               5/5               Sensation:           intact to light touch x 4 extremities                                       Current functional status:  1/24/2022      Therapeutic Interventions      Sit-Stand Transfer Training  Sit-to-Stand Transfer Training Rehab Potential: fair, will monitor progress closely  Sit-to-Stand Transfer Training Symptoms Noted During/After Treatment: fatigue;  increased pain  Transfer Training Sit-to-Stand Transfer: maximum assist (25% patient effort);  2 person assist;  B hand held assist  Transfer Training Stand-to-Sit Transfer: maximum assist (25% patient effort);  2 person assist;  B hand held assist  Sit-to-Stand Transfer Training Transfer Safety Analysis: decreased balance;  ~75% erect posture 2/2 LLE contracted; x 2 trials x 20 sec;  decreased strength;  impaired balance;  decreased sensation;  pain    Therapeutic Exercise  Therapeutic Exercise Detail: seated therapeutic exercise x 15 min with close supervision: L long arc quads with end range hamsrting stretch (30 sec) x 8 reps, functional reaching with weighted bucket x 10 reps, shoulder flex/ext with weight bucket x 10 reps           PM&R Impression:    1) s/p R AKA  2) pain is limiting patient's progress and ability to tolerate  > 3 hrs per day of rehab        Disposition plan recommendations: dual plan: AR/ PINKY

## 2022-01-25 NOTE — CONSULT NOTE ADULT - CONSULT REQUESTED DATE/TIME
11-Jan-2022 14:12
06-Jan-2022 09:30
19-Jan-2022 10:15
25-Jan-2022 06:20
06-Jan-2022 15:36
07-Jan-2022 09:46
14-Jan-2022 08:38
07-Jan-2022 15:19
14-Jan-2022 10:32
06-Jan-2022 17:18

## 2022-01-25 NOTE — PROGRESS NOTE ADULT - SUBJECTIVE AND OBJECTIVE BOX
Pain Management Progress Note - [facility] [phone]    HPI: Patient seen and examined today. Patient reports adequate pain control with current pain regimen. Patient reports waking up last PM with painful muscle spasms, relieved by breakthrough Dilaudid. Patient reports improvement of pain and spasms today. Patient denies side effects from current pain regimen.    Pertinent PMH: Pain at: ___Back ___Neck___Knee ___Hip ___Shoulder ___ Opioid tolerance    Pain is _X__ sharp ____dull ___burning ___achy ___ Intensity: ____ mild ___X_mod _X___severe     Location ___X__surgical site _____cervical _____lumbar ____abd _____upper ext__X__lower ext    Worse with _X___activity __X__movement _____physical therapy___ Rest    Improved with _X___medication __X__rest ____physical therapy    PMH:  Status post percutaneous transluminal coronary angioplasty  2 stents  Atherosclerosis of coronary artery  CAD (coronary artery disease)  Osteoarthritis  HLD (hyperlipidemia)  Blood clot due to device, implant, or graft  was on blood thinners  Diabetes  on insulin pump  HTN (hypertension)  CHF (congestive heart failure)  EF ~ 25%  History of celiac disease  Diverticulitis  STEMI (ST elevation myocardial infarction)  Diabetic neuropathy  Anxiety and depression  Other postprocedural status  Fixation hardware in foot LEFT  Stented coronary artery  10/18 heart attack  INFERIOR WALL MI  S/P CABG x 1  2018  S/P TKR (total knee replacement), right  with infection Mrsa   per pt he was cleared from MRSA infection  Surgery, elective  right knee wound debridement  History of open reduction and internal fixation (ORIF) procedure  H/O shoulder surgery  right  AICD (automatic cardioverter/defibrillator) present  Cholecystostomy care  drain inserted 2020 &amp; removed 4 months ago  History of tonsillectomy  History of hip replacement, total, right    Medications:  insulin glargine Injectable (LANTUS)  multivitamin  magnesium sulfate  IVPB  nystatin Powder  simethicone  insulin lispro Injectable (ADMELOG)  prasugrel  gabapentin  insulin lispro Injectable (ADMELOG)  insulin glargine Injectable (LANTUS)  acetaminophen     Tablet ..  cyclobenzaprine  oxyCODONE    IR  oxyCODONE    IR  spironolactone  oxycodone    5 mG/acetaminophen 325 mG  HYDROmorphone  Injectable  oxycodone    5 mG/acetaminophen 325 mG  oxycodone    5 mG/acetaminophen 325 mG  HYDROmorphone  Injectable  insulin lispro Injectable (ADMELOG)  insulin glargine Injectable (LANTUS)  dextrose 50% Injectable  dextrose 50% Injectable  dextrose 5% + sodium chloride 0.45%.  HYDROmorphone  Injectable  insulin glargine Injectable (LANTUS)  simethicone  aspirin enteric coated  HYDROmorphone  Injectable  simethicone  magnesium oxide  HYDROmorphone  Injectable  acetaminophen     Tablet ..  oxycodone    5 mG/acetaminophen 325 mG  gabapentin  DULoxetine  insulin glargine Injectable (LANTUS)  acetaminophen   IVPB ..  HYDROmorphone  Injectable  melatonin  acetaminophen   IVPB ..  insulin glargine Injectable (LANTUS)  HYDROmorphone  Injectable  HYDROmorphone  Injectable  acetaminophen   IVPB ..  HYDROmorphone  Injectable  senna  insulin glargine Injectable (LANTUS)  magnesium sulfate  IVPB  potassium chloride    Tablet ER  pantoprazole    Tablet  levothyroxine  pantoprazole  Injectable  levothyroxine Injectable  fentaNYL   Infusion  norepinephrine Infusion  propofol Infusion  chlorhexidine 0.12% Liquid  potassium chloride  10 mEq/100 mL IVPB  potassium chloride    Tablet ER  insulin glargine Injectable (LANTUS)  insulin glargine Injectable (LANTUS)  DAPTOmycin IVPB  potassium chloride   Powder  HYDROmorphone  Injectable  oxyCODONE    IR  insulin lispro (ADMELOG) corrective regimen sliding scale  insulin glargine Injectable (LANTUS)  insulin lispro Injectable (ADMELOG)  HYDROmorphone  Injectable  vancomycin  IVPB  magnesium sulfate  IVPB  HYDROmorphone  Injectable  vancomycin  IVPB  magnesium sulfate  IVPB  HYDROmorphone  Injectable  oxyCODONE    IR  acetaminophen     Tablet ..  insulin lispro Injectable (ADMELOG)  insulin glargine Injectable (LANTUS)  oxyCODONE    IR  gabapentin  chlorhexidine 2% Cloths  sodium chloride 0.9% lock flush  acetaminophen     Tablet ..  HYDROmorphone  Injectable  magnesium sulfate  IVPB  insulin glargine Injectable (LANTUS)  insulin lispro Injectable (ADMELOG)  acetaminophen   IVPB ..  HYDROmorphone  Injectable  acetaminophen   IVPB ..  heparin   Injectable  HYDROmorphone  Injectable  lactated ringers.  lactated ringers.  potassium chloride    Tablet ER  spironolactone  chlorhexidine 2% Cloths  povidone iodine 5% Nasal Swab  lactated ringers.  vancomycin  IVPB  vancomycin  IVPB  insulin lispro Injectable (ADMELOG)  vancomycin  IVPB  vancomycin  IVPB  vancomycin  IVPB  enoxaparin Injectable  HYDROmorphone  Injectable  oxyCODONE    IR  oxyCODONE    IR  traMADol  oxyCODONE    IR  HYDROmorphone  Injectable  HYDROmorphone  Injectable  HYDROmorphone  Injectable  lactated ringers.  tobramycin Injectable  vancomycin  IVPB  cyclobenzaprine  acetaminophen     Tablet ..  oxyCODONE    IR  HYDROmorphone  Injectable  HYDROmorphone  Injectable  vancomycin  IVPB  vancomycin  IVPB  chlorhexidine 2% Cloths  povidone iodine 5% Nasal Swab  insulin glargine Injectable (LANTUS)  vancomycin  IVPB  glucagon  Injectable  dextrose 5%.  dextrose 50% Injectable  dextrose 50% Injectable  dextrose 50% Injectable  dextrose 5%.  dextrose 40% Gel  insulin lispro (ADMELOG) corrective regimen sliding scale  levothyroxine  pantoprazole    Tablet  furosemide    Tablet  ALPRAZolam  rifAMPin  atorvastatin  DULoxetine  aluminum hydroxide/magnesium hydroxide/simethicone Suspension  spironolactone  lactated ringers.  HYDROmorphone  Injectable  oxyCODONE    IR  oxyCODONE    IR    ROS: Const:  __N_febrile   Eyes:___ENT:___CV: _N__chest pain  Resp: __N__sob  GI:__N_nausea _N__vomiting ___N_abd pain ___npo ___clears __Y_full diet __bm  :___ Musk: __Y_pain __N_spasm  Skin:___ Neuro:  __N_sedation__N_confusion___N_ numbness __N_weakness __N_paresthesia  Psych:___anxiety  Endo:___ Heme:___Allergy:___carvedilol, enalapril, entresto, ACEi_    Hemoglobin: 9.0 g/dL (01-23 @ 11:04)  T(C): 35.8 (01-25-22 @ 09:42), Max: 37 (01-24-22 @ 22:06)  HR: 72 (01-25-22 @ 09:34) (72 - 86)  BP: 101/57 (01-25-22 @ 09:34) (101/57 - 118/64)  RR: 16 (01-25-22 @ 09:34) (16 - 18)  SpO2: 100% (01-25-22 @ 09:34) (95% - 100%)  Wt(kg): --     PHYSICAL EXAM:  Gen Appearance: _X__no acute distress __X_appropriate       Neuro: ___SILT feet____ EOM Intact Psych: AAOX_3_, __X_mood/affect appropriate        Eyes: _X__conjunctiva WNL  ___X__ Pupils equal and round        ENT: __X_ears and nose atraumatic__X_ Hearing grossly intact        Neck: __X_trachea midline, no visible masses ___thyroid without palpable mass    Resp: __X_Nml WOB____No tactile fremitus ___clear to auscultation    Cardio: __X_extremities free from edema ____pedal pulses palpable    GI/Abdomen: ___soft _____ Nontender____X__Nondistended_____HSM    Lymphatic: ___no palpable nodes in neck  ___no palpable nodes calves and feet    Skin/Wound: ___Incision, _X__Dressing c/d/i,   ____surrounding tissues soft,  ___drain/chest tube present____    Muscular: EHL ___/5  Gastrocnemius___/5    __X_absent clubbing/cyanosis         ASSESSMENT:  This is a 66y old Male with a history of diabetes, CHF, HLD, HTN, STEMI, s/p right knee explant/spacer exchange 0/2020 scheduled for repeat explant, spacer exchange and revision of gastric flap, now s/p right guillotine AKA, doing well.    Recommended Treatment PLAN:  1. Consider continuing Oxycodone 5-10 mg PO q4h PRN moderate-severe pain  2. Consider continuing Tylenol 975 mg PO TID  3. Consider continuing Flexeril 5mg PO q8h PRN muscle spasm  4. Consider continuing Dilaudid 0.5 mg IVP q4h PRN breakthrough pain  5. Consider continuing Gabapentin 300 mg PO TID  Plan discussed with Dr. Max, pt seen and examined by Dr. Max at PM rounds     Pain Management Progress Note - [facility] [phone]    HPI: Patient seen and examined today. Patient reports adequate pain control with current pain regimen. Patient reports waking up last PM with painful muscle spasms, relieved by breakthrough Dilaudid. Patient reports improvement of pain and spasms today but still endorsing pain. Patient with significantly increased pain with movement and PT. Patient denies side effects from current pain regimen.    Pertinent PMH: Pain at: ___Back ___Neck___Knee ___Hip ___Shoulder ___ Opioid tolerance    Pain is _X__ sharp ____dull ___burning ___achy ___ Intensity: ____ mild ___X_mod _X___severe     Location ___X__surgical site _____cervical _____lumbar ____abd _____upper ext__X__lower ext    Worse with _X___activity __X__movement _____physical therapy___ Rest    Improved with _X___medication __X__rest ____physical therapy    PMH:  Status post percutaneous transluminal coronary angioplasty  2 stents  Atherosclerosis of coronary artery  CAD (coronary artery disease)  Osteoarthritis  HLD (hyperlipidemia)  Blood clot due to device, implant, or graft  was on blood thinners  Diabetes  on insulin pump  HTN (hypertension)  CHF (congestive heart failure)  EF ~ 25%  History of celiac disease  Diverticulitis  STEMI (ST elevation myocardial infarction)  Diabetic neuropathy  Anxiety and depression  Other postprocedural status  Fixation hardware in foot LEFT  Stented coronary artery  10/18 heart attack  INFERIOR WALL MI  S/P CABG x 1  2018  S/P TKR (total knee replacement), right  with infection Mrsa   per pt he was cleared from MRSA infection  Surgery, elective  right knee wound debridement  History of open reduction and internal fixation (ORIF) procedure  H/O shoulder surgery  right  AICD (automatic cardioverter/defibrillator) present  Cholecystostomy care  drain inserted 2020 &amp; removed 4 months ago  History of tonsillectomy  History of hip replacement, total, right    Medications:  insulin glargine Injectable (LANTUS)  multivitamin  magnesium sulfate  IVPB  nystatin Powder  simethicone  insulin lispro Injectable (ADMELOG)  prasugrel  gabapentin  insulin lispro Injectable (ADMELOG)  insulin glargine Injectable (LANTUS)  acetaminophen     Tablet ..  cyclobenzaprine  oxyCODONE    IR  oxyCODONE    IR  spironolactone  oxycodone    5 mG/acetaminophen 325 mG  HYDROmorphone  Injectable  oxycodone    5 mG/acetaminophen 325 mG  oxycodone    5 mG/acetaminophen 325 mG  HYDROmorphone  Injectable  insulin lispro Injectable (ADMELOG)  insulin glargine Injectable (LANTUS)  dextrose 50% Injectable  dextrose 50% Injectable  dextrose 5% + sodium chloride 0.45%.  HYDROmorphone  Injectable  insulin glargine Injectable (LANTUS)  simethicone  aspirin enteric coated  HYDROmorphone  Injectable  simethicone  magnesium oxide  HYDROmorphone  Injectable  acetaminophen     Tablet ..  oxycodone    5 mG/acetaminophen 325 mG  gabapentin  DULoxetine  insulin glargine Injectable (LANTUS)  acetaminophen   IVPB ..  HYDROmorphone  Injectable  melatonin  acetaminophen   IVPB ..  insulin glargine Injectable (LANTUS)  HYDROmorphone  Injectable  HYDROmorphone  Injectable  acetaminophen   IVPB ..  HYDROmorphone  Injectable  senna  insulin glargine Injectable (LANTUS)  magnesium sulfate  IVPB  potassium chloride    Tablet ER  pantoprazole    Tablet  levothyroxine  pantoprazole  Injectable  levothyroxine Injectable  fentaNYL   Infusion  norepinephrine Infusion  propofol Infusion  chlorhexidine 0.12% Liquid  potassium chloride  10 mEq/100 mL IVPB  potassium chloride    Tablet ER  insulin glargine Injectable (LANTUS)  insulin glargine Injectable (LANTUS)  DAPTOmycin IVPB  potassium chloride   Powder  HYDROmorphone  Injectable  oxyCODONE    IR  insulin lispro (ADMELOG) corrective regimen sliding scale  insulin glargine Injectable (LANTUS)  insulin lispro Injectable (ADMELOG)  HYDROmorphone  Injectable  vancomycin  IVPB  magnesium sulfate  IVPB  HYDROmorphone  Injectable  vancomycin  IVPB  magnesium sulfate  IVPB  HYDROmorphone  Injectable  oxyCODONE    IR  acetaminophen     Tablet ..  insulin lispro Injectable (ADMELOG)  insulin glargine Injectable (LANTUS)  oxyCODONE    IR  gabapentin  chlorhexidine 2% Cloths  sodium chloride 0.9% lock flush  acetaminophen     Tablet ..  HYDROmorphone  Injectable  magnesium sulfate  IVPB  insulin glargine Injectable (LANTUS)  insulin lispro Injectable (ADMELOG)  acetaminophen   IVPB ..  HYDROmorphone  Injectable  acetaminophen   IVPB ..  heparin   Injectable  HYDROmorphone  Injectable  lactated ringers.  lactated ringers.  potassium chloride    Tablet ER  spironolactone  chlorhexidine 2% Cloths  povidone iodine 5% Nasal Swab  lactated ringers.  vancomycin  IVPB  vancomycin  IVPB  insulin lispro Injectable (ADMELOG)  vancomycin  IVPB  vancomycin  IVPB  vancomycin  IVPB  enoxaparin Injectable  HYDROmorphone  Injectable  oxyCODONE    IR  oxyCODONE    IR  traMADol  oxyCODONE    IR  HYDROmorphone  Injectable  HYDROmorphone  Injectable  HYDROmorphone  Injectable  lactated ringers.  tobramycin Injectable  vancomycin  IVPB  cyclobenzaprine  acetaminophen     Tablet ..  oxyCODONE    IR  HYDROmorphone  Injectable  HYDROmorphone  Injectable  vancomycin  IVPB  vancomycin  IVPB  chlorhexidine 2% Cloths  povidone iodine 5% Nasal Swab  insulin glargine Injectable (LANTUS)  vancomycin  IVPB  glucagon  Injectable  dextrose 5%.  dextrose 50% Injectable  dextrose 50% Injectable  dextrose 50% Injectable  dextrose 5%.  dextrose 40% Gel  insulin lispro (ADMELOG) corrective regimen sliding scale  levothyroxine  pantoprazole    Tablet  furosemide    Tablet  ALPRAZolam  rifAMPin  atorvastatin  DULoxetine  aluminum hydroxide/magnesium hydroxide/simethicone Suspension  spironolactone  lactated ringers.  HYDROmorphone  Injectable  oxyCODONE    IR  oxyCODONE    IR    ROS: Const:  __N_febrile   Eyes:___ENT:___CV: _N__chest pain  Resp: __N__sob  GI:__N_nausea _N__vomiting ___N_abd pain ___npo ___clears __Y_full diet __bm  :___ Musk: __Y_pain __N_spasm  Skin:___ Neuro:  __N_sedation__N_confusion___N_ numbness __N_weakness __N_paresthesia  Psych:___anxiety  Endo:___ Heme:___Allergy:___carvedilol, enalapril, entresto, ACEi_    Hemoglobin: 9.0 g/dL (01-23 @ 11:04)  T(C): 35.8 (01-25-22 @ 09:42), Max: 37 (01-24-22 @ 22:06)  HR: 72 (01-25-22 @ 09:34) (72 - 86)  BP: 101/57 (01-25-22 @ 09:34) (101/57 - 118/64)  RR: 16 (01-25-22 @ 09:34) (16 - 18)  SpO2: 100% (01-25-22 @ 09:34) (95% - 100%)  Wt(kg): --     PHYSICAL EXAM:  Gen Appearance: _X__no acute distress __X_appropriate       Neuro: ___SILT feet____ EOM Intact Psych: AAOX_3_, __X_mood/affect appropriate        Eyes: _X__conjunctiva WNL  ___X__ Pupils equal and round        ENT: __X_ears and nose atraumatic__X_ Hearing grossly intact        Neck: __X_trachea midline, no visible masses ___thyroid without palpable mass    Resp: __X_Nml WOB____No tactile fremitus ___clear to auscultation    Cardio: __X_extremities free from edema ____pedal pulses palpable    GI/Abdomen: ___soft _____ Nontender____X__Nondistended_____HSM    Lymphatic: ___no palpable nodes in neck  ___no palpable nodes calves and feet    Skin/Wound: ___Incision, _X__Dressing c/d/i,   ____surrounding tissues soft,  ___drain/chest tube present____    Muscular: EHL ___/5  Gastrocnemius___/5    __X_absent clubbing/cyanosis         ASSESSMENT:  This is a 66y old Male with a history of diabetes, CHF, HLD, HTN, STEMI, s/p right knee explant/spacer exchange 0/2020 scheduled for repeat explant, spacer exchange and revision of gastric flap, now s/p right guillotine AKA, doing well.    Recommended Treatment PLAN:  1. Consider increasing to Oxycodone 5-10 mg PO q3h PRN moderate-severe pain  2. Consider continuing Tylenol 975 mg PO TID  3. Consider continuing Flexeril 5mg PO q8h PRN muscle spasm  4. Consider continuing Dilaudid 0.5 mg IVP q4h PRN breakthrough pain  5. Consider continuing Gabapentin 300 mg PO TID  Plan discussed with Dr. Max, pt seen and examined by Dr. Max at PM rounds     Pain Management Progress Note - [facility] [phone]    HPI: Patient seen and examined today. Patient reports adequate pain control with current pain regimen. Patient reports waking up last PM with painful muscle spasms, relieved by breakthrough Dilaudid. Patient reports improvement of pain and spasms today but still endorsing pain. Patient with significantly increased pain with movement and PT. Patient denies side effects from current pain regimen.    Pertinent PMH: Pain at: ___Back ___Neck___Knee ___Hip ___Shoulder ___ Opioid tolerance    Pain is _X__ sharp ____dull ___burning ___achy ___ Intensity: ____ mild ___X_mod _X___severe     Location ___X__surgical site _____cervical _____lumbar ____abd _____upper ext__X__lower ext    Worse with _X___activity __X__movement _____physical therapy___ Rest    Improved with _X___medication __X__rest ____physical therapy    PMH:  Status post percutaneous transluminal coronary angioplasty  2 stents  Atherosclerosis of coronary artery  CAD (coronary artery disease)  Osteoarthritis  HLD (hyperlipidemia)  Blood clot due to device, implant, or graft  was on blood thinners  Diabetes  on insulin pump  HTN (hypertension)  CHF (congestive heart failure)  EF ~ 25%  History of celiac disease  Diverticulitis  STEMI (ST elevation myocardial infarction)  Diabetic neuropathy  Anxiety and depression  Other postprocedural status  Fixation hardware in foot LEFT  Stented coronary artery  10/18 heart attack  INFERIOR WALL MI  S/P CABG x 1  2018  S/P TKR (total knee replacement), right  with infection Mrsa   per pt he was cleared from MRSA infection  Surgery, elective  right knee wound debridement  History of open reduction and internal fixation (ORIF) procedure  H/O shoulder surgery  right  AICD (automatic cardioverter/defibrillator) present  Cholecystostomy care  drain inserted 2020 &amp; removed 4 months ago  History of tonsillectomy  History of hip replacement, total, right    Medications:  insulin glargine Injectable (LANTUS)  multivitamin  magnesium sulfate  IVPB  nystatin Powder  simethicone  insulin lispro Injectable (ADMELOG)  prasugrel  gabapentin  insulin lispro Injectable (ADMELOG)  insulin glargine Injectable (LANTUS)  acetaminophen     Tablet ..  cyclobenzaprine  oxyCODONE    IR  oxyCODONE    IR  spironolactone  oxycodone    5 mG/acetaminophen 325 mG  HYDROmorphone  Injectable  oxycodone    5 mG/acetaminophen 325 mG  oxycodone    5 mG/acetaminophen 325 mG  HYDROmorphone  Injectable  insulin lispro Injectable (ADMELOG)  insulin glargine Injectable (LANTUS)  dextrose 50% Injectable  dextrose 50% Injectable  dextrose 5% + sodium chloride 0.45%.  HYDROmorphone  Injectable  insulin glargine Injectable (LANTUS)  simethicone  aspirin enteric coated  HYDROmorphone  Injectable  simethicone  magnesium oxide  HYDROmorphone  Injectable  acetaminophen     Tablet ..  oxycodone    5 mG/acetaminophen 325 mG  gabapentin  DULoxetine  insulin glargine Injectable (LANTUS)  acetaminophen   IVPB ..  HYDROmorphone  Injectable  melatonin  acetaminophen   IVPB ..  insulin glargine Injectable (LANTUS)  HYDROmorphone  Injectable  HYDROmorphone  Injectable  acetaminophen   IVPB ..  HYDROmorphone  Injectable  senna  insulin glargine Injectable (LANTUS)  magnesium sulfate  IVPB  potassium chloride    Tablet ER  pantoprazole    Tablet  levothyroxine  pantoprazole  Injectable  levothyroxine Injectable  fentaNYL   Infusion  norepinephrine Infusion  propofol Infusion  chlorhexidine 0.12% Liquid  potassium chloride  10 mEq/100 mL IVPB  potassium chloride    Tablet ER  insulin glargine Injectable (LANTUS)  insulin glargine Injectable (LANTUS)  DAPTOmycin IVPB  potassium chloride   Powder  HYDROmorphone  Injectable  oxyCODONE    IR  insulin lispro (ADMELOG) corrective regimen sliding scale  insulin glargine Injectable (LANTUS)  insulin lispro Injectable (ADMELOG)  HYDROmorphone  Injectable  vancomycin  IVPB  magnesium sulfate  IVPB  HYDROmorphone  Injectable  vancomycin  IVPB  magnesium sulfate  IVPB  HYDROmorphone  Injectable  oxyCODONE    IR  acetaminophen     Tablet ..  insulin lispro Injectable (ADMELOG)  insulin glargine Injectable (LANTUS)  oxyCODONE    IR  gabapentin  chlorhexidine 2% Cloths  sodium chloride 0.9% lock flush  acetaminophen     Tablet ..  HYDROmorphone  Injectable  magnesium sulfate  IVPB  insulin glargine Injectable (LANTUS)  insulin lispro Injectable (ADMELOG)  acetaminophen   IVPB ..  HYDROmorphone  Injectable  acetaminophen   IVPB ..  heparin   Injectable  HYDROmorphone  Injectable  lactated ringers.  lactated ringers.  potassium chloride    Tablet ER  spironolactone  chlorhexidine 2% Cloths  povidone iodine 5% Nasal Swab  lactated ringers.  vancomycin  IVPB  vancomycin  IVPB  insulin lispro Injectable (ADMELOG)  vancomycin  IVPB  vancomycin  IVPB  vancomycin  IVPB  enoxaparin Injectable  HYDROmorphone  Injectable  oxyCODONE    IR  oxyCODONE    IR  traMADol  oxyCODONE    IR  HYDROmorphone  Injectable  HYDROmorphone  Injectable  HYDROmorphone  Injectable  lactated ringers.  tobramycin Injectable  vancomycin  IVPB  cyclobenzaprine  acetaminophen     Tablet ..  oxyCODONE    IR  HYDROmorphone  Injectable  HYDROmorphone  Injectable  vancomycin  IVPB  vancomycin  IVPB  chlorhexidine 2% Cloths  povidone iodine 5% Nasal Swab  insulin glargine Injectable (LANTUS)  vancomycin  IVPB  glucagon  Injectable  dextrose 5%.  dextrose 50% Injectable  dextrose 50% Injectable  dextrose 50% Injectable  dextrose 5%.  dextrose 40% Gel  insulin lispro (ADMELOG) corrective regimen sliding scale  levothyroxine  pantoprazole    Tablet  furosemide    Tablet  ALPRAZolam  rifAMPin  atorvastatin  DULoxetine  aluminum hydroxide/magnesium hydroxide/simethicone Suspension  spironolactone  lactated ringers.  HYDROmorphone  Injectable  oxyCODONE    IR  oxyCODONE    IR    ROS: Const:  __N_febrile   Eyes:___ENT:___CV: _N__chest pain  Resp: __N__sob  GI:__N_nausea _N__vomiting ___N_abd pain ___npo ___clears __Y_full diet __bm  :___ Musk: __Y_pain __N_spasm  Skin:___ Neuro:  __N_sedation__N_confusion___N_ numbness __N_weakness __N_paresthesia  Psych:___anxiety  Endo:___ Heme:___Allergy:___carvedilol, enalapril, entresto, ACEi_    Hemoglobin: 9.0 g/dL (01-23 @ 11:04)  T(C): 35.8 (01-25-22 @ 09:42), Max: 37 (01-24-22 @ 22:06)  HR: 72 (01-25-22 @ 09:34) (72 - 86)  BP: 101/57 (01-25-22 @ 09:34) (101/57 - 118/64)  RR: 16 (01-25-22 @ 09:34) (16 - 18)  SpO2: 100% (01-25-22 @ 09:34) (95% - 100%)  Wt(kg): --     PHYSICAL EXAM:  Gen Appearance: _X__no acute distress __X_appropriate       Neuro: ___SILT feet____ EOM Intact Psych: AAOX_3_, __X_mood/affect appropriate        Eyes: _X__conjunctiva WNL  ___X__ Pupils equal and round        ENT: __X_ears and nose atraumatic__X_ Hearing grossly intact        Neck: __X_trachea midline, no visible masses ___thyroid without palpable mass    Resp: __X_Nml WOB____No tactile fremitus ___clear to auscultation    Cardio: __X_extremities free from edema ____pedal pulses palpable    GI/Abdomen: ___soft _____ Nontender____X__Nondistended_____HSM    Lymphatic: ___no palpable nodes in neck  ___no palpable nodes calves and feet    Skin/Wound: ___Incision, _X__Dressing c/d/i,   ____surrounding tissues soft,  ___drain/chest tube present____    Muscular: EHL ___/5  Gastrocnemius___/5    __X_absent clubbing/cyanosis         ASSESSMENT:  This is a 66y old Male with a history of diabetes, CHF, HLD, HTN, STEMI, s/p right knee explant/spacer exchange 0/2020 scheduled for repeat explant, spacer exchange and revision of gastric flap, now s/p right guillotine AKA, doing well.    Recommended Treatment PLAN:  1. Consider increasing to Oxycodone 5-10 mg PO q3h PRN moderate-severe pain  2. Consider continuing Tylenol 975 mg PO TID  3. Consider continuing Flexeril 5mg PO q8h PRN muscle spasm  4. Consider continuing Dilaudid 0.5 mg IVP q4h PRN breakthrough pain  5. Consider continuing Gabapentin 300 mg PO TID  6. Patient also with chronic left knee pain. Consider adding daily Lidocaine patch to left knee  Plan discussed with Dr. Max, pt seen and examined by Dr. Max at PM rounds     Pain Management Progress Note - [facility] [phone]    HPI: Patient seen and examined today. Patient reports adequate pain control with current pain regimen. Patient reports waking up last PM with painful muscle spasms, relieved by breakthrough Dilaudid. Patient reports improvement of pain and spasms today but still endorsing pain. Patient with significantly increased pain with movement and PT. Patient denies side effects from current pain regimen.    Pertinent PMH: Pain at: ___Back ___Neck___Knee ___Hip ___Shoulder ___ Opioid tolerance    Pain is _X__ sharp ____dull ___burning ___achy ___ Intensity: ____ mild ___X_mod _X___severe     Location ___X__surgical site _____cervical _____lumbar ____abd _____upper ext__X__lower ext    Worse with _X___activity __X__movement _____physical therapy___ Rest    Improved with _X___medication __X__rest ____physical therapy    PMH:  Status post percutaneous transluminal coronary angioplasty  2 stents  Atherosclerosis of coronary artery  CAD (coronary artery disease)  Osteoarthritis  HLD (hyperlipidemia)  Blood clot due to device, implant, or graft  was on blood thinners  Diabetes  on insulin pump  HTN (hypertension)  CHF (congestive heart failure)  EF ~ 25%  History of celiac disease  Diverticulitis  STEMI (ST elevation myocardial infarction)  Diabetic neuropathy  Anxiety and depression  Other postprocedural status  Fixation hardware in foot LEFT  Stented coronary artery  10/18 heart attack  INFERIOR WALL MI  S/P CABG x 1  2018  S/P TKR (total knee replacement), right  with infection Mrsa   per pt he was cleared from MRSA infection  Surgery, elective  right knee wound debridement  History of open reduction and internal fixation (ORIF) procedure  H/O shoulder surgery  right  AICD (automatic cardioverter/defibrillator) present  Cholecystostomy care  drain inserted 2020 &amp; removed 4 months ago  History of tonsillectomy  History of hip replacement, total, right    Medications:  insulin glargine Injectable (LANTUS)  multivitamin  magnesium sulfate  IVPB  nystatin Powder  simethicone  insulin lispro Injectable (ADMELOG)  prasugrel  gabapentin  insulin lispro Injectable (ADMELOG)  insulin glargine Injectable (LANTUS)  acetaminophen     Tablet ..  cyclobenzaprine  oxyCODONE    IR  oxyCODONE    IR  spironolactone  oxycodone    5 mG/acetaminophen 325 mG  HYDROmorphone  Injectable  oxycodone    5 mG/acetaminophen 325 mG  oxycodone    5 mG/acetaminophen 325 mG  HYDROmorphone  Injectable  insulin lispro Injectable (ADMELOG)  insulin glargine Injectable (LANTUS)  dextrose 50% Injectable  dextrose 50% Injectable  dextrose 5% + sodium chloride 0.45%.  HYDROmorphone  Injectable  insulin glargine Injectable (LANTUS)  simethicone  aspirin enteric coated  HYDROmorphone  Injectable  simethicone  magnesium oxide  HYDROmorphone  Injectable  acetaminophen     Tablet ..  oxycodone    5 mG/acetaminophen 325 mG  gabapentin  DULoxetine  insulin glargine Injectable (LANTUS)  acetaminophen   IVPB ..  HYDROmorphone  Injectable  melatonin  acetaminophen   IVPB ..  insulin glargine Injectable (LANTUS)  HYDROmorphone  Injectable  HYDROmorphone  Injectable  acetaminophen   IVPB ..  HYDROmorphone  Injectable  senna  insulin glargine Injectable (LANTUS)  magnesium sulfate  IVPB  potassium chloride    Tablet ER  pantoprazole    Tablet  levothyroxine  pantoprazole  Injectable  levothyroxine Injectable  fentaNYL   Infusion  norepinephrine Infusion  propofol Infusion  chlorhexidine 0.12% Liquid  potassium chloride  10 mEq/100 mL IVPB  potassium chloride    Tablet ER  insulin glargine Injectable (LANTUS)  insulin glargine Injectable (LANTUS)  DAPTOmycin IVPB  potassium chloride   Powder  HYDROmorphone  Injectable  oxyCODONE    IR  insulin lispro (ADMELOG) corrective regimen sliding scale  insulin glargine Injectable (LANTUS)  insulin lispro Injectable (ADMELOG)  HYDROmorphone  Injectable  vancomycin  IVPB  magnesium sulfate  IVPB  HYDROmorphone  Injectable  vancomycin  IVPB  magnesium sulfate  IVPB  HYDROmorphone  Injectable  oxyCODONE    IR  acetaminophen     Tablet ..  insulin lispro Injectable (ADMELOG)  insulin glargine Injectable (LANTUS)  oxyCODONE    IR  gabapentin  chlorhexidine 2% Cloths  sodium chloride 0.9% lock flush  acetaminophen     Tablet ..  HYDROmorphone  Injectable  magnesium sulfate  IVPB  insulin glargine Injectable (LANTUS)  insulin lispro Injectable (ADMELOG)  acetaminophen   IVPB ..  HYDROmorphone  Injectable  acetaminophen   IVPB ..  heparin   Injectable  HYDROmorphone  Injectable  lactated ringers.  lactated ringers.  potassium chloride    Tablet ER  spironolactone  chlorhexidine 2% Cloths  povidone iodine 5% Nasal Swab  lactated ringers.  vancomycin  IVPB  vancomycin  IVPB  insulin lispro Injectable (ADMELOG)  vancomycin  IVPB  vancomycin  IVPB  vancomycin  IVPB  enoxaparin Injectable  HYDROmorphone  Injectable  oxyCODONE    IR  oxyCODONE    IR  traMADol  oxyCODONE    IR  HYDROmorphone  Injectable  HYDROmorphone  Injectable  HYDROmorphone  Injectable  lactated ringers.  tobramycin Injectable  vancomycin  IVPB  cyclobenzaprine  acetaminophen     Tablet ..  oxyCODONE    IR  HYDROmorphone  Injectable  HYDROmorphone  Injectable  vancomycin  IVPB  vancomycin  IVPB  chlorhexidine 2% Cloths  povidone iodine 5% Nasal Swab  insulin glargine Injectable (LANTUS)  vancomycin  IVPB  glucagon  Injectable  dextrose 5%.  dextrose 50% Injectable  dextrose 50% Injectable  dextrose 50% Injectable  dextrose 5%.  dextrose 40% Gel  insulin lispro (ADMELOG) corrective regimen sliding scale  levothyroxine  pantoprazole    Tablet  furosemide    Tablet  ALPRAZolam  rifAMPin  atorvastatin  DULoxetine  aluminum hydroxide/magnesium hydroxide/simethicone Suspension  spironolactone  lactated ringers.  HYDROmorphone  Injectable  oxyCODONE    IR  oxyCODONE    IR    ROS: Const:  __N_febrile   Eyes:___ENT:___CV: _N__chest pain  Resp: __N__sob  GI:__N_nausea _N__vomiting ___N_abd pain ___npo ___clears __Y_full diet __bm  :___ Musk: __Y_pain __N_spasm  Skin:___ Neuro:  __N_sedation__N_confusion___N_ numbness __N_weakness __N_paresthesia  Psych:___anxiety  Endo:___ Heme:___Allergy:___carvedilol, enalapril, entresto, ACEi_    Hemoglobin: 9.0 g/dL (01-23 @ 11:04)  T(C): 35.8 (01-25-22 @ 09:42), Max: 37 (01-24-22 @ 22:06)  HR: 72 (01-25-22 @ 09:34) (72 - 86)  BP: 101/57 (01-25-22 @ 09:34) (101/57 - 118/64)  RR: 16 (01-25-22 @ 09:34) (16 - 18)  SpO2: 100% (01-25-22 @ 09:34) (95% - 100%)  Wt(kg): --     PHYSICAL EXAM:  Gen Appearance: _X__no acute distress __X_appropriate       Neuro: ___SILT feet____ EOM Intact Psych: AAOX_3_, __X_mood/affect appropriate        Eyes: _X__conjunctiva WNL  ___X__ Pupils equal and round        ENT: __X_ears and nose atraumatic__X_ Hearing grossly intact        Neck: __X_trachea midline, no visible masses ___thyroid without palpable mass    Resp: __X_Nml WOB____No tactile fremitus ___clear to auscultation    Cardio: __X_extremities free from edema ____pedal pulses palpable    GI/Abdomen: ___soft _____ Nontender____X__Nondistended_____HSM    Lymphatic: ___no palpable nodes in neck  ___no palpable nodes calves and feet    Skin/Wound: ___Incision, _X__Dressing c/d/i,   ____surrounding tissues soft,  ___drain/chest tube present____    Muscular: EHL ___/5  Gastrocnemius___/5    __X_absent clubbing/cyanosis

## 2022-01-26 LAB
GLUCOSE BLDC GLUCOMTR-MCNC: 115 MG/DL — HIGH (ref 70–99)
GLUCOSE BLDC GLUCOMTR-MCNC: 116 MG/DL — HIGH (ref 70–99)
GLUCOSE BLDC GLUCOMTR-MCNC: 184 MG/DL — HIGH (ref 70–99)
GLUCOSE BLDC GLUCOMTR-MCNC: 196 MG/DL — HIGH (ref 70–99)
SARS-COV-2 RNA SPEC QL NAA+PROBE: SIGNIFICANT CHANGE UP

## 2022-01-26 PROCEDURE — 99232 SBSQ HOSP IP/OBS MODERATE 35: CPT | Mod: GC

## 2022-01-26 RX ORDER — OXYCODONE HYDROCHLORIDE 5 MG/1
1 TABLET ORAL
Qty: 0 | Refills: 0 | DISCHARGE
Start: 2022-01-26

## 2022-01-26 RX ORDER — LEVOTHYROXINE SODIUM 125 MCG
1 TABLET ORAL
Refills: 0 | DISCHARGE
Start: 2022-01-26

## 2022-01-26 RX ORDER — CYCLOBENZAPRINE HYDROCHLORIDE 10 MG/1
1 TABLET, FILM COATED ORAL
Qty: 0 | Refills: 0 | DISCHARGE
Start: 2022-01-26

## 2022-01-26 RX ORDER — CHLORHEXIDINE GLUCONATE 213 G/1000ML
1 SOLUTION TOPICAL
Qty: 0 | Refills: 0 | DISCHARGE
Start: 2022-01-26

## 2022-01-26 RX ORDER — METOPROLOL TARTRATE 50 MG
1 TABLET ORAL
Refills: 0 | DISCHARGE
Start: 2022-01-26

## 2022-01-26 RX ORDER — ACETAMINOPHEN 500 MG
3 TABLET ORAL
Qty: 0 | Refills: 0 | DISCHARGE
Start: 2022-01-26

## 2022-01-26 RX ORDER — DAPTOMYCIN 500 MG/10ML
600 INJECTION, POWDER, LYOPHILIZED, FOR SOLUTION INTRAVENOUS
Qty: 0 | Refills: 0 | DISCHARGE
Start: 2022-01-26

## 2022-01-26 RX ORDER — ATORVASTATIN CALCIUM 80 MG/1
1 TABLET, FILM COATED ORAL
Qty: 0 | Refills: 0 | DISCHARGE
Start: 2022-01-26

## 2022-01-26 RX ORDER — SENNA PLUS 8.6 MG/1
2 TABLET ORAL
Qty: 0 | Refills: 0 | DISCHARGE
Start: 2022-01-26

## 2022-01-26 RX ORDER — DULOXETINE HYDROCHLORIDE 30 MG/1
1 CAPSULE, DELAYED RELEASE ORAL
Qty: 0 | Refills: 0 | DISCHARGE

## 2022-01-26 RX ORDER — GABAPENTIN 400 MG/1
1 CAPSULE ORAL
Qty: 0 | Refills: 0 | DISCHARGE
Start: 2022-01-26

## 2022-01-26 RX ORDER — PRASUGREL 5 MG/1
1 TABLET, FILM COATED ORAL
Qty: 0 | Refills: 0 | DISCHARGE
Start: 2022-01-26

## 2022-01-26 RX ORDER — LEVOTHYROXINE SODIUM 125 MCG
1 TABLET ORAL
Qty: 0 | Refills: 0 | DISCHARGE

## 2022-01-26 RX ORDER — PRASUGREL 5 MG/1
1 TABLET, FILM COATED ORAL
Qty: 0 | Refills: 0 | DISCHARGE

## 2022-01-26 RX ORDER — LEVOTHYROXINE SODIUM 125 MCG
1 TABLET ORAL
Qty: 0 | Refills: 0 | DISCHARGE
Start: 2022-01-26

## 2022-01-26 RX ORDER — ASPIRIN/CALCIUM CARB/MAGNESIUM 324 MG
1 TABLET ORAL
Qty: 0 | Refills: 0 | DISCHARGE

## 2022-01-26 RX ORDER — METOPROLOL TARTRATE 50 MG
25 TABLET ORAL DAILY
Refills: 0 | Status: DISCONTINUED | OUTPATIENT
Start: 2022-01-26 | End: 2022-01-27

## 2022-01-26 RX ORDER — SPIRONOLACTONE 25 MG/1
1 TABLET, FILM COATED ORAL
Qty: 0 | Refills: 0 | DISCHARGE
Start: 2022-01-26

## 2022-01-26 RX ORDER — SPIRONOLACTONE 25 MG/1
1 TABLET, FILM COATED ORAL
Qty: 0 | Refills: 0 | DISCHARGE

## 2022-01-26 RX ORDER — SIMETHICONE 80 MG/1
1 TABLET, CHEWABLE ORAL
Qty: 0 | Refills: 0 | DISCHARGE
Start: 2022-01-26

## 2022-01-26 RX ORDER — FUROSEMIDE 40 MG
1 TABLET ORAL
Qty: 0 | Refills: 0 | DISCHARGE

## 2022-01-26 RX ORDER — PANTOPRAZOLE SODIUM 20 MG/1
1 TABLET, DELAYED RELEASE ORAL
Qty: 0 | Refills: 0 | DISCHARGE
Start: 2022-01-26

## 2022-01-26 RX ORDER — ASPIRIN/CALCIUM CARB/MAGNESIUM 324 MG
1 TABLET ORAL
Qty: 0 | Refills: 0 | DISCHARGE
Start: 2022-01-26

## 2022-01-26 RX ORDER — DULOXETINE HYDROCHLORIDE 30 MG/1
1 CAPSULE, DELAYED RELEASE ORAL
Qty: 0 | Refills: 0 | DISCHARGE
Start: 2022-01-26

## 2022-01-26 RX ORDER — PANTOPRAZOLE SODIUM 20 MG/1
1 TABLET, DELAYED RELEASE ORAL
Qty: 0 | Refills: 0 | DISCHARGE

## 2022-01-26 RX ADMIN — Medication 975 MILLIGRAM(S): at 06:00

## 2022-01-26 RX ADMIN — DULOXETINE HYDROCHLORIDE 20 MILLIGRAM(S): 30 CAPSULE, DELAYED RELEASE ORAL at 22:22

## 2022-01-26 RX ADMIN — GABAPENTIN 300 MILLIGRAM(S): 400 CAPSULE ORAL at 15:12

## 2022-01-26 RX ADMIN — Medication 2: at 16:43

## 2022-01-26 RX ADMIN — Medication 25 MICROGRAM(S): at 05:19

## 2022-01-26 RX ADMIN — PANTOPRAZOLE SODIUM 40 MILLIGRAM(S): 20 TABLET, DELAYED RELEASE ORAL at 07:18

## 2022-01-26 RX ADMIN — OXYCODONE HYDROCHLORIDE 10 MILLIGRAM(S): 5 TABLET ORAL at 05:38

## 2022-01-26 RX ADMIN — HEPARIN SODIUM 5000 UNIT(S): 5000 INJECTION INTRAVENOUS; SUBCUTANEOUS at 05:19

## 2022-01-26 RX ADMIN — Medication 975 MILLIGRAM(S): at 05:18

## 2022-01-26 RX ADMIN — HEPARIN SODIUM 5000 UNIT(S): 5000 INJECTION INTRAVENOUS; SUBCUTANEOUS at 15:15

## 2022-01-26 RX ADMIN — OXYCODONE HYDROCHLORIDE 10 MILLIGRAM(S): 5 TABLET ORAL at 23:32

## 2022-01-26 RX ADMIN — OXYCODONE HYDROCHLORIDE 10 MILLIGRAM(S): 5 TABLET ORAL at 10:05

## 2022-01-26 RX ADMIN — OXYCODONE HYDROCHLORIDE 10 MILLIGRAM(S): 5 TABLET ORAL at 06:00

## 2022-01-26 RX ADMIN — CHLORHEXIDINE GLUCONATE 1 APPLICATION(S): 213 SOLUTION TOPICAL at 05:47

## 2022-01-26 RX ADMIN — INSULIN GLARGINE 28 UNIT(S): 100 INJECTION, SOLUTION SUBCUTANEOUS at 22:23

## 2022-01-26 RX ADMIN — DULOXETINE HYDROCHLORIDE 20 MILLIGRAM(S): 30 CAPSULE, DELAYED RELEASE ORAL at 11:06

## 2022-01-26 RX ADMIN — PRASUGREL 10 MILLIGRAM(S): 5 TABLET, FILM COATED ORAL at 11:06

## 2022-01-26 RX ADMIN — ATORVASTATIN CALCIUM 80 MILLIGRAM(S): 80 TABLET, FILM COATED ORAL at 22:22

## 2022-01-26 RX ADMIN — GABAPENTIN 300 MILLIGRAM(S): 400 CAPSULE ORAL at 22:24

## 2022-01-26 RX ADMIN — Medication 975 MILLIGRAM(S): at 23:00

## 2022-01-26 RX ADMIN — OXYCODONE HYDROCHLORIDE 10 MILLIGRAM(S): 5 TABLET ORAL at 09:16

## 2022-01-26 RX ADMIN — DAPTOMYCIN 124 MILLIGRAM(S): 500 INJECTION, POWDER, LYOPHILIZED, FOR SOLUTION INTRAVENOUS at 22:23

## 2022-01-26 RX ADMIN — SENNA PLUS 2 TABLET(S): 8.6 TABLET ORAL at 22:22

## 2022-01-26 RX ADMIN — Medication 975 MILLIGRAM(S): at 01:41

## 2022-01-26 RX ADMIN — Medication 8 UNIT(S): at 09:21

## 2022-01-26 RX ADMIN — Medication 975 MILLIGRAM(S): at 22:21

## 2022-01-26 RX ADMIN — GABAPENTIN 300 MILLIGRAM(S): 400 CAPSULE ORAL at 05:19

## 2022-01-26 RX ADMIN — Medication 8 UNIT(S): at 16:43

## 2022-01-26 RX ADMIN — SPIRONOLACTONE 25 MILLIGRAM(S): 25 TABLET, FILM COATED ORAL at 05:18

## 2022-01-26 RX ADMIN — Medication 81 MILLIGRAM(S): at 11:05

## 2022-01-26 RX ADMIN — Medication 1 TABLET(S): at 11:06

## 2022-01-26 RX ADMIN — Medication 975 MILLIGRAM(S): at 15:11

## 2022-01-26 RX ADMIN — Medication 975 MILLIGRAM(S): at 16:00

## 2022-01-26 RX ADMIN — Medication 2: at 07:17

## 2022-01-26 RX ADMIN — HEPARIN SODIUM 5000 UNIT(S): 5000 INJECTION INTRAVENOUS; SUBCUTANEOUS at 22:24

## 2022-01-26 NOTE — PROGRESS NOTE ADULT - PROBLEM SELECTOR PLAN 5
The blood pressure was controlled and he is to continue on current meds.

## 2022-01-26 NOTE — PROGRESS NOTE ADULT - SUBJECTIVE AND OBJECTIVE BOX
[facility] [phone]    HPI: Patient seen and examined today. Patient reports feeling well today, stating pain has been well controlled. Patient reports some difficulty with PT, stating he has pain in his left knee, chronic in nature. Patient was increased to Oxycodone 5-10 mg PO q3h PRN. Patient denies side effects from current pain regimen.    Pertinent PMH: Pain at: ___Back ___Neck___Knee ___Hip ___Shoulder ___ Opioid tolerance    Pain is _X__ sharp ____dull ___burning ___achy ___ Intensity: ____ mild __X__mod __X__severe     Location ____X_surgical site _____cervical _____lumbar ____abd _____upper ext__X__lower ext    Worse with __X__activity _X___movement __X___physical therapy___ Rest    Improved with __X__medication _X___rest ____physical therapy    PMH:  Status post percutaneous transluminal coronary angioplasty  2 stents  Atherosclerosis of coronary artery  CAD (coronary artery disease)  Osteoarthritis  HLD (hyperlipidemia)  Blood clot due to device, implant, or graft  was on blood thinners  Diabetes  on insulin pump  HTN (hypertension)  CHF (congestive heart failure)  EF ~ 25%  History of celiac disease  Diverticulitis  STEMI (ST elevation myocardial infarction)  Diabetic neuropathy  Anxiety and depression  Other postprocedural status  Fixation hardware in foot LEFT  Stented coronary artery  10/18 heart attack  INFERIOR WALL MI  S/P CABG x 1  2018  S/P TKR (total knee replacement), right  with infection Mrsa   per pt he was cleared from MRSA infection  Surgery, elective  right knee wound debridement  History of open reduction and internal fixation (ORIF) procedure  H/O shoulder surgery  right  AICD (automatic cardioverter/defibrillator) present  Cholecystostomy care  drain inserted 2020 &amp; removed 4 months ago  History of tonsillectomy  History of hip replacement, total, right    Medications:  metoprolol succinate ER  oxyCODONE    IR  oxyCODONE    IR  insulin glargine Injectable (LANTUS)  multivitamin  magnesium sulfate  IVPB  nystatin Powder  simethicone  insulin lispro Injectable (ADMELOG)  prasugrel  gabapentin  insulin lispro Injectable (ADMELOG)  insulin glargine Injectable (LANTUS)  acetaminophen     Tablet ..  cyclobenzaprine  oxyCODONE    IR  oxyCODONE    IR  spironolactone  oxycodone    5 mG/acetaminophen 325 mG  HYDROmorphone  Injectable  oxycodone    5 mG/acetaminophen 325 mG  oxycodone    5 mG/acetaminophen 325 mG  HYDROmorphone  Injectable  insulin lispro Injectable (ADMELOG)  insulin glargine Injectable (LANTUS)  dextrose 50% Injectable  dextrose 50% Injectable  dextrose 5% + sodium chloride 0.45%.  HYDROmorphone  Injectable  insulin glargine Injectable (LANTUS)  simethicone  aspirin enteric coated  HYDROmorphone  Injectable  simethicone  magnesium oxide  HYDROmorphone  Injectable  acetaminophen     Tablet ..  oxycodone    5 mG/acetaminophen 325 mG  gabapentin  DULoxetine  insulin glargine Injectable (LANTUS)  acetaminophen   IVPB ..  HYDROmorphone  Injectable  melatonin  acetaminophen   IVPB ..  insulin glargine Injectable (LANTUS)  HYDROmorphone  Injectable  HYDROmorphone  Injectable  acetaminophen   IVPB ..  HYDROmorphone  Injectable  senna  insulin glargine Injectable (LANTUS)  magnesium sulfate  IVPB  potassium chloride    Tablet ER  pantoprazole    Tablet  levothyroxine  pantoprazole  Injectable  levothyroxine Injectable  fentaNYL   Infusion  norepinephrine Infusion  propofol Infusion  chlorhexidine 0.12% Liquid  potassium chloride  10 mEq/100 mL IVPB  potassium chloride    Tablet ER  insulin glargine Injectable (LANTUS)  insulin glargine Injectable (LANTUS)  DAPTOmycin IVPB  potassium chloride   Powder  HYDROmorphone  Injectable  oxyCODONE    IR  insulin lispro (ADMELOG) corrective regimen sliding scale  insulin glargine Injectable (LANTUS)  insulin lispro Injectable (ADMELOG)  HYDROmorphone  Injectable  vancomycin  IVPB  magnesium sulfate  IVPB  HYDROmorphone  Injectable  vancomycin  IVPB  magnesium sulfate  IVPB  HYDROmorphone  Injectable  oxyCODONE    IR  acetaminophen     Tablet ..  insulin lispro Injectable (ADMELOG)  insulin glargine Injectable (LANTUS)  oxyCODONE    IR  gabapentin  chlorhexidine 2% Cloths  sodium chloride 0.9% lock flush  acetaminophen     Tablet ..  HYDROmorphone  Injectable  magnesium sulfate  IVPB  insulin glargine Injectable (LANTUS)  insulin lispro Injectable (ADMELOG)  acetaminophen   IVPB ..  HYDROmorphone  Injectable  acetaminophen   IVPB ..  heparin   Injectable  HYDROmorphone  Injectable  lactated ringers.  lactated ringers.  potassium chloride    Tablet ER  spironolactone  chlorhexidine 2% Cloths  povidone iodine 5% Nasal Swab  lactated ringers.  vancomycin  IVPB  vancomycin  IVPB  insulin lispro Injectable (ADMELOG)  vancomycin  IVPB  vancomycin  IVPB  vancomycin  IVPB  enoxaparin Injectable  HYDROmorphone  Injectable  oxyCODONE    IR  oxyCODONE    IR  traMADol  oxyCODONE    IR  HYDROmorphone  Injectable  HYDROmorphone  Injectable  HYDROmorphone  Injectable  lactated ringers.  tobramycin Injectable  vancomycin  IVPB  cyclobenzaprine  acetaminophen     Tablet ..  oxyCODONE    IR  HYDROmorphone  Injectable  HYDROmorphone  Injectable  vancomycin  IVPB  vancomycin  IVPB  chlorhexidine 2% Cloths  povidone iodine 5% Nasal Swab  insulin glargine Injectable (LANTUS)  vancomycin  IVPB  glucagon  Injectable  dextrose 5%.  dextrose 50% Injectable  dextrose 50% Injectable  dextrose 50% Injectable  dextrose 5%.  dextrose 40% Gel  insulin lispro (ADMELOG) corrective regimen sliding scale  levothyroxine  pantoprazole    Tablet  furosemide    Tablet  ALPRAZolam  rifAMPin  atorvastatin  DULoxetine  aluminum hydroxide/magnesium hydroxide/simethicone Suspension  spironolactone  lactated ringers.  HYDROmorphone  Injectable  oxyCODONE    IR  oxyCODONE    IR    ROS: Const:  _N__febrile   Eyes:___ENT:___CV: _N__chest pain  Resp: ___N_sob  GI:__N_nausea _N__vomiting _N___abd pain ___npo ___clears _Y__full diet __bm  :___ Musk: __Y_pain ___spasm  Skin:___ Neuro:  __N_sedation__N_confusion__N__ numbness ___weakness _N__paresthesia  Psych:___anxiety  Endo:___ Heme:___Allergy:_carvedilol, enalapril, entresto, ACEi__    T(C): 36.4 (01-26-22 @ 09:39), Max: 36.9 (01-26-22 @ 06:45)  HR: 80 (01-26-22 @ 08:38) (80 - 85)  BP: 107/56 (01-26-22 @ 08:38) (107/56 - 132/57)  RR: 16 (01-26-22 @ 08:38) (16 - 18)  SpO2: 98% (01-26-22 @ 08:38) (98% - 100%)  Wt(kg): --     PHYSICAL EXAM:  Gen Appearance: __X_no acute distress __X_appropriate      Neuro: ___SILT feet____ EOM Intact Psych: AAOX3__, _X__mood/affect appropriate        Eyes: _X__conjunctiva WNL  ___X__ Pupils equal and round        ENT: __X_ears and nose atraumatic_X__ Hearing grossly intact        Neck: X___trachea midline, no visible masses ___thyroid without palpable mass    Resp: _X__Nml WOB____No tactile fremitus ___clear to auscultation    Cardio: __X_extremities free from edema ____pedal pulses palpable    GI/Abdomen: ___soft _____ Nontender____X__Nondistended_____HSM    Lymphatic: ___no palpable nodes in neck  ___no palpable nodes calves and feet    Skin/Wound: ___Incision, X___Dressing c/d/i,   ____surrounding tissues soft,  ___drain/chest tube present____    Muscular: EHL ___/5  Gastrocnemius___/5    __X_absent clubbing/cyanosis         ASSESSMENT:  This is a 66y old Male with a history of diabetes, CHF, HLD, HTN, STEMI, s/p right knee explant/spacer exchange 0/2020 scheduled for repeat explant, spacer exchange and revision of gastric flap, now s/p right guillotine AKA, doing well, but pain with PT.    Recommended Treatment PLAN:  1. Consider continuing Oxycodone 5-10 mg PO q3h PRN moderate-severe pain for now  2. Consider continuing Tylenol 975 mg PO TID  3. Consider continuing Flexeril 5mg PO q8h PRN muscle spasm  4. Consider continuing Dilaudid 0.5 mg IVP q4h PRN breakthrough pain  5. Consider continuing Gabapentin 300 mg PO TID  6. Consider adding daily Lidocaine patch to left knee  Plan discussed with Dr. Max      [facility] [phone]    HPI: Patient seen and examined today. Patient reports feeling well today, stating pain has been well controlled. Patient reports some difficulty with PT, stating he has pain in his left knee, chronic in nature. Patient was increased to Oxycodone 5-10 mg PO q3h PRN. Patient denies side effects from current pain regimen.    Pertinent PMH: Pain at: ___Back ___Neck___Knee ___Hip ___Shoulder ___ Opioid tolerance    Pain is _X__ sharp ____dull ___burning ___achy ___ Intensity: ____ mild __X__mod __X__severe     Location ____X_surgical site _____cervical _____lumbar ____abd _____upper ext__X__lower ext    Worse with __X__activity _X___movement __X___physical therapy___ Rest    Improved with __X__medication _X___rest ____physical therapy    PMH:  Status post percutaneous transluminal coronary angioplasty  2 stents  Atherosclerosis of coronary artery  CAD (coronary artery disease)  Osteoarthritis  HLD (hyperlipidemia)  Blood clot due to device, implant, or graft  was on blood thinners  Diabetes  on insulin pump  HTN (hypertension)  CHF (congestive heart failure)  EF ~ 25%  History of celiac disease  Diverticulitis  STEMI (ST elevation myocardial infarction)  Diabetic neuropathy  Anxiety and depression  Other postprocedural status  Fixation hardware in foot LEFT  Stented coronary artery  10/18 heart attack  INFERIOR WALL MI  S/P CABG x 1  2018  S/P TKR (total knee replacement), right  with infection Mrsa   per pt he was cleared from MRSA infection  Surgery, elective  right knee wound debridement  History of open reduction and internal fixation (ORIF) procedure  H/O shoulder surgery  right  AICD (automatic cardioverter/defibrillator) present  Cholecystostomy care  drain inserted 2020 &amp; removed 4 months ago  History of tonsillectomy  History of hip replacement, total, right    Medications:  metoprolol succinate ER  oxyCODONE    IR  oxyCODONE    IR  insulin glargine Injectable (LANTUS)  multivitamin  magnesium sulfate  IVPB  nystatin Powder  simethicone  insulin lispro Injectable (ADMELOG)  prasugrel  gabapentin  insulin lispro Injectable (ADMELOG)  insulin glargine Injectable (LANTUS)  acetaminophen     Tablet ..  cyclobenzaprine  oxyCODONE    IR  oxyCODONE    IR  spironolactone  oxycodone    5 mG/acetaminophen 325 mG  HYDROmorphone  Injectable  oxycodone    5 mG/acetaminophen 325 mG  oxycodone    5 mG/acetaminophen 325 mG  HYDROmorphone  Injectable  insulin lispro Injectable (ADMELOG)  insulin glargine Injectable (LANTUS)  dextrose 50% Injectable  dextrose 50% Injectable  dextrose 5% + sodium chloride 0.45%.  HYDROmorphone  Injectable  insulin glargine Injectable (LANTUS)  simethicone  aspirin enteric coated  HYDROmorphone  Injectable  simethicone  magnesium oxide  HYDROmorphone  Injectable  acetaminophen     Tablet ..  oxycodone    5 mG/acetaminophen 325 mG  gabapentin  DULoxetine  insulin glargine Injectable (LANTUS)  acetaminophen   IVPB ..  HYDROmorphone  Injectable  melatonin  acetaminophen   IVPB ..  insulin glargine Injectable (LANTUS)  HYDROmorphone  Injectable  HYDROmorphone  Injectable  acetaminophen   IVPB ..  HYDROmorphone  Injectable  senna  insulin glargine Injectable (LANTUS)  magnesium sulfate  IVPB  potassium chloride    Tablet ER  pantoprazole    Tablet  levothyroxine  pantoprazole  Injectable  levothyroxine Injectable  fentaNYL   Infusion  norepinephrine Infusion  propofol Infusion  chlorhexidine 0.12% Liquid  potassium chloride  10 mEq/100 mL IVPB  potassium chloride    Tablet ER  insulin glargine Injectable (LANTUS)  insulin glargine Injectable (LANTUS)  DAPTOmycin IVPB  potassium chloride   Powder  HYDROmorphone  Injectable  oxyCODONE    IR  insulin lispro (ADMELOG) corrective regimen sliding scale  insulin glargine Injectable (LANTUS)  insulin lispro Injectable (ADMELOG)  HYDROmorphone  Injectable  vancomycin  IVPB  magnesium sulfate  IVPB  HYDROmorphone  Injectable  vancomycin  IVPB  magnesium sulfate  IVPB  HYDROmorphone  Injectable  oxyCODONE    IR  acetaminophen     Tablet ..  insulin lispro Injectable (ADMELOG)  insulin glargine Injectable (LANTUS)  oxyCODONE    IR  gabapentin  chlorhexidine 2% Cloths  sodium chloride 0.9% lock flush  acetaminophen     Tablet ..  HYDROmorphone  Injectable  magnesium sulfate  IVPB  insulin glargine Injectable (LANTUS)  insulin lispro Injectable (ADMELOG)  acetaminophen   IVPB ..  HYDROmorphone  Injectable  acetaminophen   IVPB ..  heparin   Injectable  HYDROmorphone  Injectable  lactated ringers.  lactated ringers.  potassium chloride    Tablet ER  spironolactone  chlorhexidine 2% Cloths  povidone iodine 5% Nasal Swab  lactated ringers.  vancomycin  IVPB  vancomycin  IVPB  insulin lispro Injectable (ADMELOG)  vancomycin  IVPB  vancomycin  IVPB  vancomycin  IVPB  enoxaparin Injectable  HYDROmorphone  Injectable  oxyCODONE    IR  oxyCODONE    IR  traMADol  oxyCODONE    IR  HYDROmorphone  Injectable  HYDROmorphone  Injectable  HYDROmorphone  Injectable  lactated ringers.  tobramycin Injectable  vancomycin  IVPB  cyclobenzaprine  acetaminophen     Tablet ..  oxyCODONE    IR  HYDROmorphone  Injectable  HYDROmorphone  Injectable  vancomycin  IVPB  vancomycin  IVPB  chlorhexidine 2% Cloths  povidone iodine 5% Nasal Swab  insulin glargine Injectable (LANTUS)  vancomycin  IVPB  glucagon  Injectable  dextrose 5%.  dextrose 50% Injectable  dextrose 50% Injectable  dextrose 50% Injectable  dextrose 5%.  dextrose 40% Gel  insulin lispro (ADMELOG) corrective regimen sliding scale  levothyroxine  pantoprazole    Tablet  furosemide    Tablet  ALPRAZolam  rifAMPin  atorvastatin  DULoxetine  aluminum hydroxide/magnesium hydroxide/simethicone Suspension  spironolactone  lactated ringers.  HYDROmorphone  Injectable  oxyCODONE    IR  oxyCODONE    IR    ROS: Const:  _N__febrile   Eyes:___ENT:___CV: _N__chest pain  Resp: ___N_sob  GI:__N_nausea _N__vomiting _N___abd pain ___npo ___clears _Y__full diet __bm  :___ Musk: __Y_pain ___spasm  Skin:___ Neuro:  __N_sedation__N_confusion__N__ numbness ___weakness _N__paresthesia  Psych:___anxiety  Endo:___ Heme:___Allergy:_carvedilol, enalapril, entresto, ACEi__    T(C): 36.4 (01-26-22 @ 09:39), Max: 36.9 (01-26-22 @ 06:45)  HR: 80 (01-26-22 @ 08:38) (80 - 85)  BP: 107/56 (01-26-22 @ 08:38) (107/56 - 132/57)  RR: 16 (01-26-22 @ 08:38) (16 - 18)  SpO2: 98% (01-26-22 @ 08:38) (98% - 100%)  Wt(kg): --     PHYSICAL EXAM:  Gen Appearance: __X_no acute distress __X_appropriate      Neuro: ___SILT feet____ EOM Intact Psych: AAOX3__, _X__mood/affect appropriate        Eyes: _X__conjunctiva WNL  ___X__ Pupils equal and round        ENT: __X_ears and nose atraumatic_X__ Hearing grossly intact        Neck: X___trachea midline, no visible masses ___thyroid without palpable mass    Resp: _X__Nml WOB____No tactile fremitus ___clear to auscultation    Cardio: __X_extremities free from edema ____pedal pulses palpable    GI/Abdomen: ___soft _____ Nontender____X__Nondistended_____HSM    Lymphatic: ___no palpable nodes in neck  ___no palpable nodes calves and feet    Skin/Wound: ___Incision, X___Dressing c/d/i,   ____surrounding tissues soft,  ___drain/chest tube present____    Muscular: EHL ___/5  Gastrocnemius___/5    __X_absent clubbing/cyanosis

## 2022-01-26 NOTE — PROGRESS NOTE ADULT - PROBLEM SELECTOR PLAN 7
he cleared by cardio.
he cleared by cardio.  He is on lasix and aldactone
he cleared by cardio.

## 2022-01-26 NOTE — PROGRESS NOTE ADULT - SUBJECTIVE AND OBJECTIVE BOX
O/N; VSS, ANAHI  1/24: Increase PO pain meds to Q3 per pain mgmt          ---------------------------------------------------------------------------  PLEASE CHECK WHEN PRESENT:  [ x ] Heart Failure  [  ] Acute  [  ] Acute on Chronic  [x  ] Chronic  -------------------------------------------------------------------  [  ]Diastolic [HFpEF]  [x  ]Systolic [HFrEF]  [  ]Combined [HFpEF & HFrEF]  [  ] afib  [x  ] hypertensive heart disease  [  ]Other:  -------------------------------------------------------------------  [ ] Respiratory failure  [ ] Acute cor pulmonale  [ ] Asthma/COPD Exacerbation  [ ] Pleural effusion  [ ] Aspiration pneumonia  -------------------------------------------------------------------  [  ]MOISES  [  ]ATN  [  ]Reneal Medullary Necrosis  [  ]Renal Cortical Necrosis  [  ]Other Pathological Lesions:    [  ]CKD 1  [  ]CKD 2  [  ]CKD 3  [  ]CKD 4  [  ]CKD 5  [  ]Other  -------------------------------------------------------------------  [ x ]Diabetes  [  ] Diabetic PVD Ulcer  [  ] Neuropathic ulcer to DM  [  ] Diabetes with Nephropathy  [  ] Osteomyelitis due to diabetes  --------------------------------------------------------------------  [  ]Malnutrition: See Nutrition Note  [  ]Cachexia  [  ]Other:   [  ]Supplement Ordered:  [  ]Morbid Obesity (BMI >=40]  ---------------------------------------------------------------------  [ ] Sepsis/severe sepsis/septic shock  [ ] UTI  [ ] Pneumonia  -----------------------------------------------------------------------  [ ] Acidosis/alkalosis  [ ] Fluid overload  [ ] Hypokalemia  [ ] Hyperkalemia  [ ] Hypomagnesemia  [ ] Hypophosphatemia  [ ] Hyperphosphatemia  ------------------------------------------------------------------------  [ ] Acute blood loss anemia  [ ] Post op blood loss anemia  [ ] Iron deficiency anemia  [ ] Anemia due to chronic disease  [ ] Hypercoagulable state  ----------------------------------------------------------------------  [ ] Cerebral infarction  [ ] Transient ischemia attack  [ ] Encephalopathy        A/P: 66y Male Hx CAD, CAD s/p MIDCAB/VERONICA 2018, AICD (2/2020), CHF (EF 15-20%), DM, 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 Castro (explant and abx spacer exchange), transferred here on 1/6 from [facility] for MRSA bacteremia due to recurrent PJI. He was taken to OR with ortho on Revision gastroc flap by PRS, antibiotic cement spacer removal, I&D, replacement on 1/6, then taken to OR with vascular on 1/10 for right AKA. taken to OR for completion AKA today, but hypotensive on induction, required pressors, procedure aborted, kept intubated and sent to SICU. Extubated in SICU on 1/14 and stepped down. 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   - abx duration 4 weeks Dapto (EOT2/7)    DM:  -ISS  -Lantus 25, lispro 10TID  appreciate endo recs     Diet:   Consistent carb    Activity:   -PT/OT consult  - anticipate acute rehab placement     DVTPPx: HSQ    Dispo:  pending Acute rehab placement  O/N; VSS, ANAHI  1/24: Increase PO pain meds to Q3 per pain mgmt      SUBJECTIVE: Patient seen at bedside in good spirits. No CP, SOB, fevers or chills    Vital Signs Last 24 Hrs  T(C): 36.4 (25 Jan 2022 22:02), Max: 36.4 (25 Jan 2022 22:02)  T(F): 97.5 (25 Jan 2022 22:02), Max: 97.5 (25 Jan 2022 22:02)  HR: 82 (25 Jan 2022 23:00) (72 - 85)  BP: 113/66 (26 Jan 2022 06:00) (101/57 - 132/57)  BP(mean): 85 (26 Jan 2022 06:00) (74 - 85)  RR: 17 (26 Jan 2022 06:00) (16 - 18)  SpO2: 99% (26 Jan 2022 06:00) (99% - 100%)    Physical Exam:  General: No acute distress, resting comfortably in bed  C/V: paced rhythm   Pulm: Nonlabored breathing, no respiratory distress  Abd: soft, non-tender, non-distended.  Extrem: warm and well perfused, no edema. RLE AKA site without erythema, tenderness, or hematoma. Stitches and staples in place.     Lines/drains/tubes:    I&O's Summary    24 Jan 2022 07:01  -  25 Jan 2022 07:00  --------------------------------------------------------  IN: 540 mL / OUT: 2080 mL / NET: -1540 mL    25 Jan 2022 07:01  -  26 Jan 2022 06:40  --------------------------------------------------------  IN: 800 mL / OUT: 1050 mL / NET: -250 mL        LABS:              CAPILLARY BLOOD GLUCOSE      POCT Blood Glucose.: 196 mg/dL (26 Jan 2022 06:29)  POCT Blood Glucose.: 123 mg/dL (25 Jan 2022 21:41)  POCT Blood Glucose.: 100 mg/dL (25 Jan 2022 16:51)  POCT Blood Glucose.: 356 mg/dL (25 Jan 2022 12:04)  POCT Blood Glucose.: 177 mg/dL (25 Jan 2022 06:48)        RADIOLOGY & ADDITIONAL STUDIES:          ---------------------------------------------------------------------------  PLEASE CHECK WHEN PRESENT:  [ x ] Heart Failure  [  ] Acute  [  ] Acute on Chronic  [x  ] Chronic  -------------------------------------------------------------------  [  ]Diastolic [HFpEF]  [x  ]Systolic [HFrEF]  [  ]Combined [HFpEF & HFrEF]  [  ] afib  [x  ] hypertensive heart disease  [  ]Other:  -------------------------------------------------------------------  [ ] Respiratory failure  [ ] Acute cor pulmonale  [ ] Asthma/COPD Exacerbation  [ ] Pleural effusion  [ ] Aspiration pneumonia  -------------------------------------------------------------------  [  ]MOISES  [  ]ATN  [  ]Reneal Medullary Necrosis  [  ]Renal Cortical Necrosis  [  ]Other Pathological Lesions:    [  ]CKD 1  [  ]CKD 2  [  ]CKD 3  [  ]CKD 4  [  ]CKD 5  [  ]Other  -------------------------------------------------------------------  [ x ]Diabetes  [  ] Diabetic PVD Ulcer  [  ] Neuropathic ulcer to DM  [  ] Diabetes with Nephropathy  [  ] Osteomyelitis due to diabetes  --------------------------------------------------------------------  [  ]Malnutrition: See Nutrition Note  [  ]Cachexia  [  ]Other:   [  ]Supplement Ordered:  [  ]Morbid Obesity (BMI >=40]  ---------------------------------------------------------------------  [ ] Sepsis/severe sepsis/septic shock  [ ] UTI  [ ] Pneumonia  -----------------------------------------------------------------------  [ ] Acidosis/alkalosis  [ ] Fluid overload  [ ] Hypokalemia  [ ] Hyperkalemia  [ ] Hypomagnesemia  [ ] Hypophosphatemia  [ ] Hyperphosphatemia  ------------------------------------------------------------------------  [ ] Acute blood loss anemia  [ ] Post op blood loss anemia  [ ] Iron deficiency anemia  [ ] Anemia due to chronic disease  [ ] Hypercoagulable state  ----------------------------------------------------------------------  [ ] Cerebral infarction  [ ] Transient ischemia attack  [ ] Encephalopathy        A/P: 66y Male Hx CAD, CAD s/p MIDCAB/VERONICA 2018, AICD (2/2020), CHF (EF 15-20%), DM, 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 He was taken to OR with ortho on Revision gastroc flap by PRS, antibiotic cement spacer removal, I&D, replacement on 1/6, then taken to OR with vascular on 1/10 for right AKA. taken to OR for completion AKA today, but hypotensive on induction, required pressors, procedure aborted, kept intubated and sent to SICU. Extubated in SICU on 1/14 and stepped down. 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   - abx duration 4 weeks Dapto (EOT2/7)    DM:  -ISS  -Lantus 25, lispro 10TID  appreciate endo recs     Diet:   Consistent carb    Activity:   -PT/OT consult  - anticipate acute rehab placement     DVTPPx: HSQ    Dispo:  pending Acute rehab placement

## 2022-01-26 NOTE — ADVANCED PRACTICE NURSE CONSULT - ASSESSMENT
5 yo M with HTN, hyperlipidemia, type II DM with peripheral neuropathy, CAD s/p MIDCAB (2018)/VERONICA, HFrEF, AICD (2/20), pulmonary HTN with recurrent R knee PPJI.  He is now s/p arthroscopic I&D in view of prior difficulties with his incision/wound and other co-morbidities. Pt with resolving fungal rash to bilat buttocks, no pressure injuries noted. Currently using Nystatin powder for rash, left antifungal moisture barrier ointment at bedside for patient to use prn and take home at discharge.

## 2022-01-26 NOTE — PROGRESS NOTE ADULT - PROBLEM SELECTOR PLAN 4
Resolved and followup on repeat blood culture which is negative to date. and Restart vancomycin and follow levels.  TTE
Resolved and followup on repeat blood culture.
Resolved and followup on repeat blood culture. and Restart vancomycin and follow levels.  TTE
Resolved and followup on repeat blood culture which is negative to date. and on Daptomycin 600mg IV q24h.  JOHN PAUL neg for vegetation on valve and leads.
Resolved and followup on repeat blood culture which is negative to date. and on Daptomycin 600mg IV q24h.  JOHN PAUL neg for vegetation on valve and leads.  Resolved
Resolved and followup on repeat blood culture which is negative to date. and on Daptomycin 600mg IV q24h.  JOHN PAUL neg for vegetation on valve and leads.
Resolved and followup on repeat blood culture.

## 2022-01-26 NOTE — PROGRESS NOTE ADULT - PROBLEM/PLAN-5
Patient called and left a voicemail stating that she would like a call back in regards to her rash as it is getting worse and he would like to know what she can do for it    [phone]
DISPLAY PLAN FREE TEXT

## 2022-01-26 NOTE — PROGRESS NOTE ADULT - SUBJECTIVE AND OBJECTIVE BOX
Interval Events: Reviewed  Patient seen and examined at bedside.    Patient is a 66y old  Male who presents with a chief complaint of R Knee Pain (26 Jan 2022 05:26)    he is doing well and waiting for rehab  PAST MEDICAL & SURGICAL HISTORY:  Status post percutaneous transluminal coronary angioplasty  2 stents    Atherosclerosis of coronary artery  CAD (coronary artery disease)    Osteoarthritis    HLD (hyperlipidemia)    Blood clot due to device, implant, or graft  was on blood thinners    Diabetes  on insulin pump    HTN (hypertension)    CHF (congestive heart failure)  EF ~ 25%    History of celiac disease    Diverticulitis    STEMI (ST elevation myocardial infarction)    Diabetic neuropathy    Anxiety and depression    Preoperative clearance    Other postprocedural status  Fixation hardware in foot LEFT    Stented coronary artery  10/18 heart attack  INFERIOR WALL MI    S/P CABG x 1  2018    S/P TKR (total knee replacement), right  with infection Mrsa   per pt he was cleared from MRSA infection    Surgery, elective  right knee wound debridement    History of open reduction and internal fixation (ORIF) procedure    H/O shoulder surgery  right    AICD (automatic cardioverter/defibrillator) present    Cholecystostomy care  drain inserted 2020 &amp; removed 4 months ago    History of tonsillectomy    History of hip replacement, total, right        MEDICATIONS:  Pulmonary:    Antimicrobials:  DAPTOmycin IVPB 600 milliGRAM(s) IV Intermittent every 24 hours    Anticoagulants:  aspirin enteric coated 81 milliGRAM(s) Oral daily  heparin   Injectable 5000 Unit(s) SubCutaneous every 8 hours  prasugrel 10 milliGRAM(s) Oral daily    Cardiac:  metoprolol succinate ER 25 milliGRAM(s) Oral daily  spironolactone 25 milliGRAM(s) Oral daily      Allergies    ACE inhibitors (Hives)  carvedilol (Other)  enalapril (Hives)  Entresto (Other)    Intolerances        Vital Signs Last 24 Hrs  T(C): 36.4 (26 Jan 2022 09:39), Max: 36.9 (26 Jan 2022 06:45)  T(F): 97.5 (26 Jan 2022 09:39), Max: 98.4 (26 Jan 2022 06:45)  HR: 80 (26 Jan 2022 08:38) (80 - 85)  BP: 107/56 (26 Jan 2022 08:38) (107/56 - 132/57)  BP(mean): 74 (26 Jan 2022 08:38) (74 - 85)  RR: 16 (26 Jan 2022 08:38) (16 - 18)  SpO2: 98% (26 Jan 2022 08:38) (98% - 100%)    01-25 @ 07:01 - 01-26 @ 07:00  --------------------------------------------------------  IN: 800 mL / OUT: 1050 mL / NET: -250 mL    01-26 @ 07:01 - 01-26 @ 10:56  --------------------------------------------------------  IN: 180 mL / OUT: 0 mL / NET: 180 mL          Review of Systems:   •	General: negative  •	Skin/Breast: negative  •	Ophthalmologic: negative  •	ENMT: negative  •	Respiratory and Thorax: negative  •	Cardiovascular: negative  •	Gastrointestinal: negative  •	Genitourinary: negative  •	Musculoskeletal: negative  •	Neurological: negative  •	Psychiatric: negative  •	Hematology/Lymphatics: negative  •	Endocrine: negative  •	Allergic/Immunologic: negative    Physical Exam:   • Constitutional:	refer to the dietion /Nutritionist note  • Eyes:	EOMI; PERRL; no drainage or redness  • ENMT:	No oral lesions; no gross abnormalities  • Neck	No bruits; no thyromegaly or nodules  • Breasts:	not examined  • Back:	No deformity or limitation of movement  • Respiratory:	Breath Sounds equal & clear to percussion & auscultation, no accessory muscle use  • Cardiovascular:	Regular rate & rhythm, normal S1, S2; no murmurs, gallops or rubs; no S3, S4  • Gastrointestinal:	Soft, non-tender, no hepatosplenomegaly, normal bowel sounds  • Genitourinary:	not examined  • Rectal: not examined  • Extremities:	No cyanosis, clubbing or edema  • Vascular:	Equal and normal pulses (carotid, femoral, dorsalis pedis)  • Neurologica:l	not examined  • Skin:	No lesions; no rash  • Lymph Nodes:	No lymphadedenopathy  • Musculoskeletal:	No joint pain, swelling or deformity; no limitation of movement        LABS:                                  RADIOLOGY & ADDITIONAL STUDIES (The following images were personally reviewed):

## 2022-01-26 NOTE — PROGRESS NOTE ADULT - PROBLEM SELECTOR PLAN 1
Followup on the cultures from the OR which is positive for MRSA  s/p right AKA, 1/10/22  he is on daptomycin and followed by ID.  PIC  line is stable and not infected

## 2022-01-26 NOTE — PROGRESS NOTE ADULT - TIME BILLING
Patient seen and examined with house-staff during bedside rounds.  Resident note read, including vitals, physical findings, laboratory data, and radiological reports.   Revisions included below.  Direct personal management at bed side and extensive interpretation of the data.  Plan was outlined and discussed in details with the housestaff.  Decision making of high complexity  Action taken for acute disease activity to reflect the level of care provided:  - medication reconciliation  - review laboratory data
treatment of MRSA endocarditis
treatment of MRSA bacteremia
Hyperglycemia/insulin adjustment
Patient seen and examined with house-staff during bedside rounds.  Resident note read, including vitals, physical findings, laboratory data, and radiological reports.   Revisions included below.  Direct personal management at bed side and extensive interpretation of the data.  Plan was outlined and discussed in details with the housestaff.  Decision making of high complexity  Action taken for acute disease activity to reflect the level of care provided:  - medication reconciliation  - review laboratory data
Patient seen and examined with house-staff during bedside rounds.  Resident note read, including vitals, physical findings, laboratory data, and radiological reports.   Revisions included below.  Direct personal management at bed side and extensive interpretation of the data.  Plan was outlined and discussed in details with the housestaff.  Decision making of high complexity  Action taken for acute disease activity to reflect the level of care provided:  - medication reconciliation  - review laboratory data
Insulin adjustment
Hyperglycemia/insulin adjustment
Insulin adjustment for surgery and decreased PO intake
Insulin adjustment
Hyperglycemia
Insulin adjustment
Hypoglycemia/insulin adjustment
Hyperglycemia/Insulin adjustment
Hyperglycemia/insulin adjustment
Fluctuating glucoses/insulin adjustment
Insulin adjustment
Patient seen and examined with house-staff during bedside rounds.  Resident note read, including vitals, physical findings, laboratory data, and radiological reports.   Revisions included below.  Direct personal management at bed side and extensive interpretation of the data.  Plan was outlined and discussed in details with the housestaff.  Decision making of high complexity  Action taken for acute disease activity to reflect the level of care provided:  - medication reconciliation  - review laboratory data

## 2022-01-26 NOTE — PROGRESS NOTE ADULT - PROBLEM SELECTOR PLAN 2
Continue insulin sliding scale. Maintain her blood sugar in the range between 140- 180, and not below 70. Monitor for hypoglycemia. Monitor blood sugar with advancing diet. Hold oral hypoglycemic agents during hospitalization. Adjust insulin.  Followed by pam.  he had hypoglycemia and insulin was adjusted
TSH 4-5 past several years, not on therapy. Nov 2021 - TSH 8.08, free T4 0.9; started on levothyroxine 25mcg daily.  continue levothyroxine 25mcg daily.  TSH:2.85  Ft4:0.941.
TSH 4-5 past several years, not on therapy. Nov 2021 - TSH 8.08, free T4 0.9; started on levothyroxine 25mcg daily.  continue levothyroxine 25mcg daily.  TSH:2.85  Ft4:0.941
TSH 4-5 past several years, not on therapy. Nov 2021 - TSH 8.08, free T4 0.9; started on levothyroxine 25mcg daily.  continue levothyroxine 25mcg daily.  TSH:2.85  Ft4:0.941.
TSH 4-5 past several years, not on therapy. Nov 2021 - TSH 8.08, free T4 0.9; started on levothyroxine 25mcg daily.  continue levothyroxine 25mcg daily.  TSH:2.85  Ft4:0.941.
TSH 4-5 past several years, not on therapy. Nov 2021 - TSH 8.08, free T4 0.9; started on levothyroxine 25mcg daily.  continue levothyroxine 25mcg daily.  TSH:2.85  Ft4:0.941
TSH 4-5 past several years, not on therapy. Nov 2021 - TSH 8.08, free T4 0.9; started on levothyroxine 25mcg daily.  continue levothyroxine 25mcg daily.  TSH:2.85  Ft4:0.941
Continue insulin sliding scale. Maintain her blood sugar in the range between 140- 180, and not below 70. Monitor for hypoglycemia. Monitor blood sugar with advancing diet. Hold oral hypoglycemic agents during hospitalization. Adjust insulin.
Continue insulin sliding scale. Maintain her blood sugar in the range between 140- 180, and not below 70. Monitor for hypoglycemia. Monitor blood sugar with advancing diet. Hold oral hypoglycemic agents during hospitalization. Adjust insulin.  Followed by pam.  he had hypoglycemia and insulin was adjusted again today
Continue insulin sliding scale. Maintain her blood sugar in the range between 140- 180, and not below 70. Monitor for hypoglycemia. Monitor blood sugar with advancing diet. Hold oral hypoglycemic agents during hospitalization. Adjust insulin.
Continue insulin sliding scale. Maintain her blood sugar in the range between 140- 180, and not below 70. Monitor for hypoglycemia. Monitor blood sugar with advancing diet. Hold oral hypoglycemic agents during hospitalization. Adjust insulin.  Followed by pam.
Continue insulin sliding scale. Maintain her blood sugar in the range between 140- 180, and not below 70. Monitor for hypoglycemia. Monitor blood sugar with advancing diet. Hold oral hypoglycemic agents during hospitalization. Adjust insulin.  Followed by endo.  The blood sugar is controlled and no further episode of hypoglycemia
Continue insulin sliding scale. Maintain her blood sugar in the range between 140- 180, and not below 70. Monitor for hypoglycemia. Monitor blood sugar with advancing diet. Hold oral hypoglycemic agents during hospitalization. Adjust insulin.  Followed by pam.  The blood sugar is controlled and no further episode of hypoglycemia.  He had a spike in 300's yesterday and will follow
Continue insulin sliding scale. Maintain her blood sugar in the range between 140- 180, and not below 70. Monitor for hypoglycemia. Monitor blood sugar with advancing diet. Hold oral hypoglycemic agents during hospitalization. Adjust insulin.  Followed by pam and BS is on the roiise
Continue insulin sliding scale. Maintain her blood sugar in the range between 140- 180, and not below 70. Monitor for hypoglycemia. Monitor blood sugar with advancing diet. Hold oral hypoglycemic agents during hospitalization. Adjust insulin.

## 2022-01-26 NOTE — PROGRESS NOTE ADULT - PROBLEM SELECTOR PLAN 8
Monitor hemoglobin. Hold transfusion unless hemoglobin is 7, 8 in patient with coronary artery disease, hemodynamic instability such as tachycardia/hypotension, or there is evidence of acute blood loss.  Anemia is due to postoperative blood loss.

## 2022-01-26 NOTE — PROGRESS NOTE ADULT - PROBLEM SELECTOR PLAN 10
Patient still has a PICC line. s/p right AKA, 1/10/22
Patient still has a PICC line. s/p right AKA, 1/10/22
Patient still has a PICC line. Patient is for the OR for removal of the hardware on the right knee.
Patient still has a PICC line. Patient is for the OR for removal of the hardware on the right knee.
Patient still has a PICC line. s/p right AKA, 1/10/22
Patient still has a PICC line. Patient is for the OR for removal of the hardware on the right knee.
Patient still has a PICC line. Patient is for the OR for removal of the hardware on the right knee.

## 2022-01-27 ENCOUNTER — TRANSCRIPTION ENCOUNTER (OUTPATIENT)
Age: 67
End: 2022-01-27

## 2022-01-27 VITALS
RESPIRATION RATE: 16 BRPM | HEART RATE: 97 BPM | OXYGEN SATURATION: 97 % | DIASTOLIC BLOOD PRESSURE: 57 MMHG | SYSTOLIC BLOOD PRESSURE: 102 MMHG

## 2022-01-27 LAB — GLUCOSE BLDC GLUCOMTR-MCNC: 166 MG/DL — HIGH (ref 70–99)

## 2022-01-27 RX ADMIN — Medication 2: at 07:43

## 2022-01-27 RX ADMIN — Medication 25 MICROGRAM(S): at 06:25

## 2022-01-27 RX ADMIN — CHLORHEXIDINE GLUCONATE 1 APPLICATION(S): 213 SOLUTION TOPICAL at 07:45

## 2022-01-27 RX ADMIN — Medication 25 MILLIGRAM(S): at 06:26

## 2022-01-27 RX ADMIN — HEPARIN SODIUM 5000 UNIT(S): 5000 INJECTION INTRAVENOUS; SUBCUTANEOUS at 06:26

## 2022-01-27 RX ADMIN — Medication 8 UNIT(S): at 08:47

## 2022-01-27 RX ADMIN — OXYCODONE HYDROCHLORIDE 10 MILLIGRAM(S): 5 TABLET ORAL at 07:58

## 2022-01-27 RX ADMIN — PANTOPRAZOLE SODIUM 40 MILLIGRAM(S): 20 TABLET, DELAYED RELEASE ORAL at 07:43

## 2022-01-27 RX ADMIN — GABAPENTIN 300 MILLIGRAM(S): 400 CAPSULE ORAL at 07:46

## 2022-01-27 RX ADMIN — Medication 975 MILLIGRAM(S): at 06:26

## 2022-01-27 RX ADMIN — SPIRONOLACTONE 25 MILLIGRAM(S): 25 TABLET, FILM COATED ORAL at 07:46

## 2022-01-27 RX ADMIN — OXYCODONE HYDROCHLORIDE 10 MILLIGRAM(S): 5 TABLET ORAL at 03:23

## 2022-01-27 NOTE — PROGRESS NOTE ADULT - ATTENDING SUPERVISION STATEMENT
ACP
Fellow
ACP
ACP
Fellow
Fellow
Resident
ACP
Fellow
Fellow
Resident
Resident
ACP
Fellow
ACP

## 2022-01-27 NOTE — PROGRESS NOTE ADULT - ATTENDING COMMENTS
ASSESSMENT:  This is a 66y old Male with a history of diabetes, CHF, HLD, HTN, STEMI, s/p right knee explant/spacer exchange 0/2020 scheduled for repeat explant, spacer exchange and revision of gastric flap, now s/p right guillotine AKA, doing well.    Recommended Treatment PLAN:  1. Consider continuing Oxycodone 5-10 mg PO q4h PRN moderate-severe pain  2. Consider continuing Tylenol 975 mg PO TID  3. Consider continuing Flexeril 5mg PO q8h PRN muscle spasm  4. Consider continuing Dilaudid 0.5 mg IVP q4h PRN breakthrough pain  5. Consider continuing Gabapentin 300 mg PO TID  Pt seen and examined by me, NP acted as scribe
ASSESSMENT:  This is a 66y old Male with a history of diabetes, CHF, HLD, HTN, STEMI, s/p right knee explant/spacer exchange 0/2020 scheduled for repeat explant, spacer exchange and revision of gastric flap, now s/p right guillotine AKA, with right limb pain moderately improved.     Recommended Treatment PLAN:  1. Consider Oxycodone 5-10 mg PO q4h PRN moderate-severe pain  2. Consider continuing Tylenol 975 mg PO TID  3. Consider continuing Flexeril 5mg PO q8h PRN muscle spasm  4. Consider continuing Dilaudid 0.5 mg IVP q4h PRN breakthrough pain  5. Consider continuing Gabapentin 300 mg PO TID  Pt seen and examined by me, np acted as scribe
Pt seen on rounds this afternoon.  Has been sleeping for much of the day, says that this is because he did not sleep last night  Fingersticks delmi mildly overnight, AM was 193 today with 40 Lantus.  Ate very little breakfast, and pre-lunch today was down to 97 mg%  Nurse then held the prelunch lispro because the pt did not eat  Says that his pain had improved, but that he was having more pain in his left leg and foot  --Since he will be NPO for OR tomorrow, would decrease the Lantus to 35 units tonight  --Continue the gabapentin and Cymbalta  --Would have Vascular also recheck his left leg, which is cool over the distal tibial area and foot.  Cannot feel DP pulse (though this is possibly not new).  Would want to be sure that the pt's pain is not ischemic.
Pt. seen/ examined  labs/ vitals reviewed  Significant pain  Discussed with vascular surgery: Dr. Malloy  Plan for AKA tomorrow, 1/10/22 - patient agreed that this is the best course of action in current circumstances
ASSESSMENT:  This is a 66y old Male with a history of diabetes, CHF, HLD, HTN, STEMI, s/p right knee explant/spacer exchange 0/2020 scheduled for repeat explant, spacer exchange and revision of gastric flap, now s/p right hiwotlotine AKA, doing well, but pain with PT.    Recommended Treatment PLAN:  1. Consider continuing Oxycodone 5-10 mg PO q3h PRN moderate-severe pain for now  2. Consider continuing Tylenol 975 mg PO TID  3. Consider continuing Flexeril 5mg PO q8h PRN muscle spasm  4. Consider continuing Dilaudid 0.5 mg IVP q4h PRN breakthrough pain  5. Consider continuing Gabapentin 300 mg PO TID  6. Consider adding daily Lidocaine patch to left knee  Pt seen and examined by me, NP acted as scribe
ASSESSMENT:  This is a 66y old Male with a history of diabetes, CHF, HLD, HTN, STEMI, s/p right knee explant/spacer exchange 0/2020 scheduled for repeat explant, spacer exchange and revision of gastric flap, now s/p right hiwotlotine AKA, doing well, but pain with PT.    Recommended Treatment PLAN:  1. Consider continuing Oxycodone 5-10 mg PO q3h PRN moderate-severe pain for now  2. Consider continuing Tylenol 975 mg PO TID  3. Consider continuing Flexeril 5mg PO q8h PRN muscle spasm  4. Consider continuing Dilaudid 0.5 mg IVP q4h PRN breakthrough pain  5. Consider continuing Gabapentin 300 mg PO TID  6. Consider adding daily Lidocaine patch to left knee  Pt seen and examined by me, NP acted as scribe
ASSESSMENT:  This is a 66y old Male with a history of diabetes, CHF, HLD, HTN, STEMI, s/p right knee explant/spacer exchange 0/2020 scheduled for repeat explant, spacer exchange and revision of gastric flap, with some pain to the right knee.    Recommended Treatment PLAN:  1. Oxycodone 5-10 mg PO q4h PRN moderate-severe pain   2. Dilaudid 0.5 mg IVP q3h PRN breakthrough pain  3. Flexeril 5 mg PO q8h PRN muscle spasm  4. Tylenol 1000 mg PO TID  pt seen and examined by me, NP acted as scribe
Pt. seen/ examined  Labs/ vitals reviewed  Discussed case w/ ID: awaiting JOHN PAUL, gallium scan  Discussed significant probability of amputation with patient - will be seen by vascular team for further discussion of options  Will follow
ASSESSMENT:  This is a 66y old Male with a history of diabetes, CHF, HLD, HTN, STEMI, s/p right knee explant/spacer exchange 0/2020 scheduled for repeat explant, spacer exchange and revision of gastric flap, now s/p right guillotine AKA, with right limb pain.    Recommended Treatment PLAN:  1. Consider splitting Percocet into separate Tylenol and Oxycodone to better manage each agent individually  2. Consider increasing to Oxycodone 5-10 mg PO q4h PRN moderate-severe pain  3. Consider starting Tylenol 975 mg PO TID  4. Consider continuing Gabapentin 200 mg PO TID   5. Consider continuing Dilaudid 0.5 mg IVP q4h PRN breakthrough pain  6. Consider starting Flexeril 5 mg PO q8h PRN muscle spasm  Pt seen and examined by me, np acted as scribe
CHF (EF 15-20%),  R TKA several years ago Tx from 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   Completion AKA aborted for hypotensive on induction.  Now off pressors, continue abx.  No need for diuretics now, patient is euvolemic. Will start home dose of spironolactone in day or two.
Pt. seen/ examined 1/12/22  Labs/ vitals reviewed  Discussed w/ ID  Plan: wound closure w/ Dr. Malloy  No ortho intervention at this time
ASSESSMENT:  This is a 66y old Male with a history of diabetes, CHF, HLD, HTN, STEMI, s/p right knee explant/spacer exchange 0/2020 scheduled for repeat explant, spacer exchange and revision of gastric flap, now s/p right guillotine AKA, doing well.    Recommended Treatment PLAN:  1. Consider increasing to Oxycodone 5-10 mg PO q3h PRN moderate-severe pain  2. Consider continuing Tylenol 975 mg PO TID  3. Consider continuing Flexeril 5mg PO q8h PRN muscle spasm  4. Consider continuing Dilaudid 0.5 mg IVP q4h PRN breakthrough pain  5. Consider continuing Gabapentin 300 mg PO TID  6. Patient also with chronic left knee pain. Consider adding daily Lidocaine patch to left knee  Pt seen and examined by me, NP acted as scribe
ASSESSMENT:  This is a 66y old Male with a history of diabetes, CHF, HLD, HTN, STEMI, s/p right knee explant/spacer exchange 0/2020 scheduled for repeat explant, spacer exchange and revision of gastric flap, now s/p right guillotine AKA, with right limb pain moderately improved.     Recommended Treatment PLAN:  1. Continue Oxycodone 5-10 mg PO q4h PRN moderate-severe pain  2. Consider continuing Tylenol 975 mg PO TID  3. Consider continuing Flexeril 5mg PO q8h PRN muscle spasm  4. Consider continuing Dilaudid 0.5 mg IVP q4h PRN breakthrough pain  5. Consider continuing Gabapentin 300 mg PO TID  Pt seen and examined by me, NP acted as scribe
Pt seen on rounds this afternoon and events of the weekend reviewed.  Glucoses have been mostly in target range, but his PO intake has been erratic and overall poor.  FS dropped to 88 before lunch today because of insufficient intake at breakfast.  He complains about the hospital food but the problem seems to be with his appetite--ate very little of the food brought in by his daughter yesterday, even took only two bites of her pizza.  Glucoses have been somewhat high before breakfast (188 today) so will increase the Lantus to 28 units.  Will keep the premeal at 8 units given his unpredictable intake.  Appears to be having much less pain in his R leg, which is still Ace-wrapped.  Has a gauze dressing over the skin graft site on his left pretibium which is clean.
Pt seen on rounds this afternoon.  Glucoses have been mostly in the 200 range since late yesterday, but the pt has been ordering food from outside--had Chinese dumplings last night, egg/cheese sandwich for breakfast.  Fingerstick also increased to 249 this morning despite slight increase in the Lantus.  He denies eating after 10 PM, but this has to be taken with "a few grains of salt."  There was a bag of candy by the bedside yesterday which I pointed out to him was not sugar-free, and it seems to be gone today.  With his being NPO after MN tonight, will decrease the Lantus slightly to 25 units.  Continue current premeal lispro when diet resumed
Pt seen on rounds this afternoon.  He initially told Dr. Gayle that he did not want us to follow him any longer, feeling that he was constantly being questioned about his food intake (and food from outside).  Had a long talk with him myself about this problem.  Told him that we acknowledged that this situation was not going to change, and that we were no longer attempting to "confront" him about this.  Most of the food that he is ordering is reasonable in terms of carb content (except for the muffin and coffee which were brought in by his ex-colleagues).  Explained that we needed to know what he was eating so that we did not "aguilar" this with an inordinate increase in insulin dose--which could then lead to hypoglycemia.  He became quite emotional during the discussion, and only now seems to be coming to  with the reality of his amputation.  He has also been having significant pain--some of this is lancinating in quality, some is dull and throbbing.  He had previously been on duloxetine at 30 mg BID, but has been prescribed only 20 mg--Have increased this to 20 mg BID  He was on gabapentin at home, but does not know the dose.  This could well help with the lancinating component of the pain, though he is currently being given only 100 mg TID.  Will increase to 200 mg q8h for 1 day, then 300 mg q8h  Will increase the Lantus to 40 units given the rising glucoses overnight
Pt seen on rounds this afternoon, though was sleepy when I saw him and did not want to wake up to talk.  Glucoses are intermittently elevated to over 200 mg% (308 mg% this morning), but he continues to eat food from outside.  Does not seem to be productive to "confront" him about this--he simply becomes defensive and does not change his behavior.  Did not initially believe his story of a muffin and coffee (with sugar) in the middle of the night but nurses seemed to verify this.  Will increase the Lantus to 35 units, primarily because of the rising blood glucose the night before surgery, when he was presumably NPO after MN
Pt seen on rounds this afternoon.  Underwent R AKA earlier today.  Is having only moderate stump pain, and very little phantom sensation in his foot.  Appetite had improved, though he was NPO today.  Glucoses 190-250 yesterday, but decreased to 157/137 today while npo.  --Given the overnight drop in his blood sugar, will decrease the Lantus to 17 units, but increase the premeal to 7 units with his improved PO intake and elevated post-prandial glucoses
Pt seen on rounds this afternoon.  Pain has decreased somewhat, though he now reports mild pain in his left leg.  Denies any phantom pain on the right.   Appetite remains good.  AM fingerstick today still above target at 176, so will increase the Lantus to 25 units  Will also increase the pre-meal to 9 units given the elevated post-prandials.   (The FS of 103 at noon today was after he had been NPO all morning)
AM fingerstick dropped to 87 mg% today despite a decrease in Lantus because he was to be NPO for OR.  This is a sufficient departure from his previous pattern to call into question his report of not eating after 10 PM on most of the previous nights when his glucoses delmi overnight.  His glucose then remained in the 80 range while he was NPO for the OR  Will decrease the Lantus further to 28 units as a precaution, but also increase the pre-meal to 12 units given the sharp rise in the 10 PM glucose to 270 last night
Pt seen on rounds this afternoon.  As noted, had been sleeping most of the day, seems to have eaten breakfast but no lunch.  Pain and spasms in his R thigh have decreased, and says that the pain in his left leg only occurred when he tried to bend his knee.  HIs R foot was distinctly warmer than on yesterday's exam, although I could still not feel distal pulses  Glucose was fairly stable overnight (170/148) but fell to 82 before lunch  Will decrease the premeal lispro to 8 units
Pt seen on rounds this afternoon.  Has been in somewhat more pain today, rohit during the dressing change  Appetite remains excellent  Right AKA stump Ace-wrapped, no drainage on the dressing  Fingersticks are still somewhat above target range, partially because of improved PO intake, but his glucoses also delmi overnight.  --Given the overnight rise, will increase the Lantus to 22 units  --Increase the premeal to 7 units lispro
Pt seen on rounds this afternoon.  Looks fatigued, somewhat pale and "washed out."  Has been in considerable pain, complaining of spasms in his R leg.  Has only mild pain in the left leg, though this leg is quite cold and blanched below the knee.    Ate very poorly at breakfast, and fingerstick dropped to 47 before lunch.  Did a better job with lunch, and says that he will be eating an adequate supper  Glucoses were quite steady but slightly "tight" overnight (105/110) with 28 Lantus--will decrease to 25 units as a precaution  Will also decrease the premeal to 10 units as a precaution  Need to check previous Dopplers on the left leg.  Pt seems to have been scheduled to have a sympathectomy at [facility]
Pt seen on rounds this afternoon.  Surgery aborted after pt became hemodynamically unstable in the OR--reason unclear.  Is now in SICU, extubated, alert  Glucoses > 200 overnight and this morning, pt denies eating after 10 PM  Will increase the Lantus to 35 units

## 2022-01-27 NOTE — DISCHARGE NOTE NURSING/CASE MANAGEMENT/SOCIAL WORK - PATIENT PORTAL LINK FT
You can access the FollowMyHealth Patient Portal offered by [facility] by registering at the following website: [email or web address]. By joining [employer]s FollowMyHealth portal, you will also be able to view your health information using other applications (apps) compatible with our system.

## 2022-01-27 NOTE — DISCHARGE NOTE NURSING/CASE MANAGEMENT/SOCIAL WORK - NSDCPEFALRISK_GEN_ALL_CORE
For information on Fall & Injury Prevention, visit: [email or web address] OR  [email or web address] OR  https://www.cdc.gov/steadi/patient.html

## 2022-01-27 NOTE — PROGRESS NOTE ADULT - PROVIDER SPECIALTY LIST ADULT
Cardiology
Endocrinology
Infectious Disease
Orthopedics
Pain Medicine
Vascular Surgery
Cardiology
Endocrinology
Endocrinology
Infectious Disease
Internal Medicine
Orthopedics
Pain Medicine
Vascular Surgery
Cardiology
Endocrinology
Infectious Disease
Orthopedics
Orthopedics
Pain Medicine
Plastic Surgery
SICU
Vascular Surgery
Infectious Disease
Infectious Disease
Endocrinology
Endocrinology
Infectious Disease
Endocrinology
Endocrinology
Internal Medicine
Endocrinology
Internal Medicine

## 2022-01-27 NOTE — PROGRESS NOTE ADULT - REASON FOR ADMISSION
R Knee Pain

## 2022-01-27 NOTE — PROGRESS NOTE ADULT - SUBJECTIVE AND OBJECTIVE BOX
O/N: COVID neg  1/26: metoprolol started, Accepted to [facility], covid ordered                             ---------------------------------------------------------------------------  PLEASE CHECK WHEN PRESENT:  [ x ] Heart Failure  [  ] Acute  [  ] Acute on Chronic  [x  ] Chronic  -------------------------------------------------------------------  [  ]Diastolic [HFpEF]  [x  ]Systolic [HFrEF]  [  ]Combined [HFpEF & HFrEF]  [  ] afib  [x  ] hypertensive heart disease  [  ]Other:  -------------------------------------------------------------------  [ ] Respiratory failure  [ ] Acute cor pulmonale  [ ] Asthma/COPD Exacerbation  [ ] Pleural effusion  [ ] Aspiration pneumonia  -------------------------------------------------------------------  [  ]MOISES  [  ]ATN  [  ]Reneal Medullary Necrosis  [  ]Renal Cortical Necrosis  [  ]Other Pathological Lesions:    [  ]CKD 1  [  ]CKD 2  [  ]CKD 3  [  ]CKD 4  [  ]CKD 5  [  ]Other  -------------------------------------------------------------------  [ x ]Diabetes  [  ] Diabetic PVD Ulcer  [  ] Neuropathic ulcer to DM  [  ] Diabetes with Nephropathy  [  ] Osteomyelitis due to diabetes  --------------------------------------------------------------------  [  ]Malnutrition: See Nutrition Note  [  ]Cachexia  [  ]Other:   [  ]Supplement Ordered:  [  ]Morbid Obesity (BMI >=40]  ---------------------------------------------------------------------  [ ] Sepsis/severe sepsis/septic shock  [ ] UTI  [ ] Pneumonia  -----------------------------------------------------------------------  [ ] Acidosis/alkalosis  [ ] Fluid overload  [ ] Hypokalemia  [ ] Hyperkalemia  [ ] Hypomagnesemia  [ ] Hypophosphatemia  [ ] Hyperphosphatemia  ------------------------------------------------------------------------  [ ] Acute blood loss anemia  [ ] Post op blood loss anemia  [ ] Iron deficiency anemia  [ ] Anemia due to chronic disease  [ ] Hypercoagulable state  ----------------------------------------------------------------------  [ ] Cerebral infarction  [ ] Transient ischemia attack  [ ] Encephalopathy        A/P: 66y Male Hx CAD, CAD s/p MIDCAB/VERONICA 2018, AICD (2/2020), CHF (EF 15-20%), DM, 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 He was taken to OR with ortho on Revision gastroc flap by PRS, antibiotic cement spacer removal, I&D, replacement on 1/6, then taken to OR with vascular on 1/10 for right AKA. taken to OR for completion AKA today, but hypotensive on induction, required pressors, procedure aborted, kept intubated and sent to SICU. Extubated in SICU on 1/14 and stepped down. 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   - abx duration 4 weeks Dapto (EOT2/7)    DM:  -ISS  -Lantus 25, lispro 10TID  appreciate endo recs     Diet:   Consistent carb    Activity:   -PT/OT consult  - anticipate acute rehab placement     DVTPPx: HSQ    Dispo:  pending Acute rehab placement    O/N: COVID neg  1/26: metoprolol started, Accepted to [facility] covid ordered       SUBJECTIVE: Patient seen at bedside in no acute distress. No CP, SOB, fevers or chills     Vital Signs Last 24 Hrs  T(C): 35.8 (27 Jan 2022 06:38), Max: 36.4 (26 Jan 2022 09:39)  T(F): 96.4 (27 Jan 2022 06:38), Max: 97.5 (26 Jan 2022 09:39)  HR: 86 (27 Jan 2022 06:00) (80 - 90)  BP: 125/59 (27 Jan 2022 06:00) (107/56 - 125/59)  BP(mean): 84 (27 Jan 2022 06:00) (74 - 84)  RR: 16 (27 Jan 2022 06:00) (15 - 16)  SpO2: 98% (27 Jan 2022 06:00) (97% - 98%)    Physical Exam:  General: No acute distress, resting comfortably in bed  C/V: paced rhythm   Pulm: Nonlabored breathing, no respiratory distress  Abd: soft, non-tender, non-distended.  Extrem: warm and well perfused, no edema. RLE AKA site without erythema, tenderness, or hematoma. Stitches and staples in mehreen    Lines/drains/tubes:    I&O's Summary    26 Jan 2022 07:01  -  27 Jan 2022 07:00  --------------------------------------------------------  IN: 360 mL / OUT: 1200 mL / NET: -840 mL        LABS:              CAPILLARY BLOOD GLUCOSE      POCT Blood Glucose.: 166 mg/dL (27 Jan 2022 06:55)  POCT Blood Glucose.: 115 mg/dL (26 Jan 2022 21:54)  POCT Blood Glucose.: 184 mg/dL (26 Jan 2022 16:23)  POCT Blood Glucose.: 116 mg/dL (26 Jan 2022 13:01)        RADIOLOGY & ADDITIONAL STUDIES:                      ---------------------------------------------------------------------------  PLEASE CHECK WHEN PRESENT:  [ x ] Heart Failure  [  ] Acute  [  ] Acute on Chronic  [x  ] Chronic  -------------------------------------------------------------------  [  ]Diastolic [HFpEF]  [x  ]Systolic [HFrEF]  [  ]Combined [HFpEF & HFrEF]  [  ] afib  [x  ] hypertensive heart disease  [  ]Other:  -------------------------------------------------------------------  [ ] Respiratory failure  [ ] Acute cor pulmonale  [ ] Asthma/COPD Exacerbation  [ ] Pleural effusion  [ ] Aspiration pneumonia  -------------------------------------------------------------------  [  ]MOISES  [  ]ATN  [  ]Reneal Medullary Necrosis  [  ]Renal Cortical Necrosis  [  ]Other Pathological Lesions:    [  ]CKD 1  [  ]CKD 2  [  ]CKD 3  [  ]CKD 4  [  ]CKD 5  [  ]Other  -------------------------------------------------------------------  [ x ]Diabetes  [  ] Diabetic PVD Ulcer  [  ] Neuropathic ulcer to DM  [  ] Diabetes with Nephropathy  [  ] Osteomyelitis due to diabetes  --------------------------------------------------------------------  [  ]Malnutrition: See Nutrition Note  [  ]Cachexia  [  ]Other:   [  ]Supplement Ordered:  [  ]Morbid Obesity (BMI >=40]  ---------------------------------------------------------------------  [ ] Sepsis/severe sepsis/septic shock  [ ] UTI  [ ] Pneumonia  -----------------------------------------------------------------------  [ ] Acidosis/alkalosis  [ ] Fluid overload  [ ] Hypokalemia  [ ] Hyperkalemia  [ ] Hypomagnesemia  [ ] Hypophosphatemia  [ ] Hyperphosphatemia  ------------------------------------------------------------------------  [ ] Acute blood loss anemia  [ ] Post op blood loss anemia  [ ] Iron deficiency anemia  [ ] Anemia due to chronic disease  [ ] Hypercoagulable state  ----------------------------------------------------------------------  [ ] Cerebral infarction  [ ] Transient ischemia attack  [ ] Encephalopathy        A/P: 66y Male Hx CAD, CAD s/p MIDCAB/VERONICA 2018, AICD (2/2020), CHF (EF 15-20%), DM, 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 Castro (explant and abx spacer exchange), transferred here on 1/6 from 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 He was taken to OR with ortho on Revision gastroc flap by PRS, antibiotic cement spacer removal, I&D, replacement on 1/6, then taken to OR with vascular on 1/10 for right AKA. taken to OR for completion AKA today, but hypotensive on induction, required pressors, procedure aborted, kept intubated and sent to SICU. Extubated in SICU on 1/14 and stepped down. 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   - abx duration 4 weeks Dapto (EOT2/7)    DM:  -ISS  -Lantus 25, lispro 10TID  appreciate endo recs     Diet:   Consistent carb    Activity:   -PT/OT consult  - anticipate acute rehab placement     DVTPPx: HSQ    Dispo:  Acute rehab today

## 2022-01-27 NOTE — PROGRESS NOTE ADULT - SUBJECTIVE AND OBJECTIVE BOX
Pain Management Progress Note - [facility] [phone]    HPI: Patient seen and examined today. Patient reports doing well today, stated pain has been well controlled with current pain regimen. Patient current hospital regimen includes Oxycodone 5-10 mg PO q3h PRN moderate-severe pain. Patient reports still having difficulty with PT d/t pain. Patient denies side effects from current pain regimen.    Pertinent PMH: Pain at: ___Back ___Neck___Knee ___Hip ___Shoulder ___ Opioid tolerance    Pain is _X__ sharp ____dull ___burning ___achy ___ Intensity: ____ mild _X___mod ___X_severe     Location ___X__surgical site _____cervical _____lumbar ____abd _____upper ext___X_lower ext    Worse with _X___activity __X__movement ___X__physical therapy___ Rest    Improved with __X__medication __X__rest ____physical therapy    PMH:  Status post percutaneous transluminal coronary angioplasty  2 stents  Atherosclerosis of coronary artery  CAD (coronary artery disease)  Osteoarthritis  HLD (hyperlipidemia)  Blood clot due to device, implant, or graft  was on blood thinners  Diabetes  on insulin pump  HTN (hypertension)  CHF (congestive heart failure)  EF ~ 25%  History of celiac disease  Diverticulitis  STEMI (ST elevation myocardial infarction)  Diabetic neuropathy  Anxiety and depression  Other postprocedural status  Fixation hardware in foot LEFT  Stented coronary artery  10/18 heart attack  INFERIOR WALL MI  S/P CABG x 1  2018  S/P TKR (total knee replacement), right  with infection Mrsa   per pt he was cleared from MRSA infection  Surgery, elective  right knee wound debridement  History of open reduction and internal fixation (ORIF) procedure  H/O shoulder surgery  right  AICD (automatic cardioverter/defibrillator) present  Cholecystostomy care  drain inserted 2020 &amp; removed 4 months ago  History of tonsillectomy  History of hip replacement, total, right    Medications:  metoprolol succinate ER  oxyCODONE    IR  oxyCODONE    IR  insulin glargine Injectable (LANTUS)  multivitamin  magnesium sulfate  IVPB  nystatin Powder  simethicone  insulin lispro Injectable (ADMELOG)  prasugrel  gabapentin  insulin lispro Injectable (ADMELOG)  insulin glargine Injectable (LANTUS)  acetaminophen     Tablet ..  cyclobenzaprine  oxyCODONE    IR  oxyCODONE    IR  spironolactone  oxycodone    5 mG/acetaminophen 325 mG  HYDROmorphone  Injectable  oxycodone    5 mG/acetaminophen 325 mG  oxycodone    5 mG/acetaminophen 325 mG  HYDROmorphone  Injectable  insulin lispro Injectable (ADMELOG)  insulin glargine Injectable (LANTUS)  dextrose 50% Injectable  dextrose 50% Injectable  dextrose 5% + sodium chloride 0.45%.  HYDROmorphone  Injectable  insulin glargine Injectable (LANTUS)  simethicone  aspirin enteric coated  HYDROmorphone  Injectable  simethicone  magnesium oxide  HYDROmorphone  Injectable  acetaminophen     Tablet ..  oxycodone    5 mG/acetaminophen 325 mG  gabapentin  DULoxetine  insulin glargine Injectable (LANTUS)  acetaminophen   IVPB ..  HYDROmorphone  Injectable  melatonin  acetaminophen   IVPB ..  insulin glargine Injectable (LANTUS)  HYDROmorphone  Injectable  HYDROmorphone  Injectable  acetaminophen   IVPB ..  HYDROmorphone  Injectable  senna  insulin glargine Injectable (LANTUS)  magnesium sulfate  IVPB  potassium chloride    Tablet ER  pantoprazole    Tablet  levothyroxine  pantoprazole  Injectable  levothyroxine Injectable  fentaNYL   Infusion  norepinephrine Infusion  propofol Infusion  chlorhexidine 0.12% Liquid  potassium chloride  10 mEq/100 mL IVPB  potassium chloride    Tablet ER  insulin glargine Injectable (LANTUS)  insulin glargine Injectable (LANTUS)  DAPTOmycin IVPB  potassium chloride   Powder  HYDROmorphone  Injectable  oxyCODONE    IR  insulin lispro (ADMELOG) corrective regimen sliding scale  insulin glargine Injectable (LANTUS)  insulin lispro Injectable (ADMELOG)  HYDROmorphone  Injectable  vancomycin  IVPB  magnesium sulfate  IVPB  HYDROmorphone  Injectable  vancomycin  IVPB  magnesium sulfate  IVPB  HYDROmorphone  Injectable  oxyCODONE    IR  acetaminophen     Tablet ..  insulin lispro Injectable (ADMELOG)  insulin glargine Injectable (LANTUS)  oxyCODONE    IR  gabapentin  chlorhexidine 2% Cloths  sodium chloride 0.9% lock flush  acetaminophen     Tablet ..  HYDROmorphone  Injectable  magnesium sulfate  IVPB  insulin glargine Injectable (LANTUS)  insulin lispro Injectable (ADMELOG)  acetaminophen   IVPB ..  HYDROmorphone  Injectable  acetaminophen   IVPB ..  heparin   Injectable  HYDROmorphone  Injectable  lactated ringers.  lactated ringers.  potassium chloride    Tablet ER  spironolactone  chlorhexidine 2% Cloths  povidone iodine 5% Nasal Swab  lactated ringers.  vancomycin  IVPB  vancomycin  IVPB  insulin lispro Injectable (ADMELOG)  vancomycin  IVPB  vancomycin  IVPB  vancomycin  IVPB  enoxaparin Injectable  HYDROmorphone  Injectable  oxyCODONE    IR  oxyCODONE    IR  traMADol  oxyCODONE    IR  HYDROmorphone  Injectable  HYDROmorphone  Injectable  HYDROmorphone  Injectable  lactated ringers.  tobramycin Injectable  vancomycin  IVPB  cyclobenzaprine  acetaminophen     Tablet ..  oxyCODONE    IR  HYDROmorphone  Injectable  HYDROmorphone  Injectable  vancomycin  IVPB  vancomycin  IVPB  chlorhexidine 2% Cloths  povidone iodine 5% Nasal Swab  insulin glargine Injectable (LANTUS)  vancomycin  IVPB  glucagon  Injectable  dextrose 5%.  dextrose 50% Injectable  dextrose 50% Injectable  dextrose 50% Injectable  dextrose 5%.  dextrose 40% Gel  insulin lispro (ADMELOG) corrective regimen sliding scale  levothyroxine  pantoprazole    Tablet  furosemide    Tablet  ALPRAZolam  rifAMPin  atorvastatin  DULoxetine  aluminum hydroxide/magnesium hydroxide/simethicone Suspension  spironolactone  lactated ringers.  HYDROmorphone  Injectable  oxyCODONE    IR  oxyCODONE    IR    ROS: Const:  _N__febrile   Eyes:___ENT:___CV: __N_chest pain  Resp: __N__sob  GI:__N_nausea _N__vomiting __N__abd pain ___npo ___clears __Y_full diet __bm  :___ Musk: __Y_pain ___spasm  Skin:___ Neuro:  __N_sedation_N__confusion_N___ numbness ___weakness _N__paresthesia  Psych:___anxiety  Endo:___ Heme:___Allergy:carvedilol, enalapril, entresto, ACEi___    T(C): 35.8 (01-27-22 @ 06:38), Max: 36.3 (01-26-22 @ 18:41)  HR: 97 (01-27-22 @ 08:39) (86 - 97)  BP: 102/57 (01-27-22 @ 08:39) (102/57 - 125/59)  RR: 16 (01-27-22 @ 08:39) (15 - 16)  SpO2: 97% (01-27-22 @ 08:39) (97% - 98%)  Wt(kg): --     PHYSICAL EXAM:  Gen Appearance: _X__no acute distress _X__appropriate       Neuro: ___SILT feet____ EOM Intact Psych: AAOX_3_, _X__mood/affect appropriate        Eyes: _X__conjunctiva WNL  ___X__ Pupils equal and round        ENT: X___ears and nose atraumatic__X_ Hearing grossly intact        Neck: _X__trachea midline, no visible masses ___thyroid without palpable mass    Resp: _X__Nml WOB____No tactile fremitus ___clear to auscultation    Cardio: __X_extremities free from edema ____pedal pulses palpable    GI/Abdomen: ___soft _____ Nontender____X__Nondistended_____HSM    Lymphatic: ___no palpable nodes in neck  ___no palpable nodes calves and feet    Skin/Wound: ___Incision, _X__Dressing c/d/i,   ____surrounding tissues soft,  ___drain/chest tube present____    Muscular: EHL ___/5  Gastrocnemius___/5    _X__absent clubbing/cyanosis         ASSESSMENT:  This is a 66y old Male with a history of diabetes, CHF, HLD, HTN, STEMI, s/p right knee explant/spacer exchange 0/2020 scheduled for repeat explant, spacer exchange and revision of gastric flap, now s/p right guillotine AKA, doing well, but pain with PT.    Recommended Treatment PLAN:  1. Consider continuing Oxycodone 5-10 mg PO q3h PRN moderate-severe pain for now  2. Consider continuing Tylenol 975 mg PO TID  3. Consider continuing Flexeril 5mg PO q8h PRN muscle spasm  4. Consider continuing Dilaudid 0.5 mg IVP q4h PRN breakthrough pain  5. Consider continuing Gabapentin 300 mg PO TID  6. Consider adding daily Lidocaine patch to left knee  Plan discussed with Dr. Wall, pt seen and examined by Dr. Wall at PM rounds     Pain Management Progress Note - [facility] [phone]    HPI: Patient seen and examined today. Patient reports doing well today, stated pain has been well controlled with current pain regimen. Patient current hospital regimen includes Oxycodone 5-10 mg PO q3h PRN moderate-severe pain. Patient reports still having difficulty with PT d/t pain. Patient denies side effects from current pain regimen.    Pertinent PMH: Pain at: ___Back ___Neck___Knee ___Hip ___Shoulder ___ Opioid tolerance    Pain is _X__ sharp ____dull ___burning ___achy ___ Intensity: ____ mild _X___mod ___X_severe     Location ___X__surgical site _____cervical _____lumbar ____abd _____upper ext___X_lower ext    Worse with _X___activity __X__movement ___X__physical therapy___ Rest    Improved with __X__medication __X__rest ____physical therapy    PMH:  Status post percutaneous transluminal coronary angioplasty  2 stents  Atherosclerosis of coronary artery  CAD (coronary artery disease)  Osteoarthritis  HLD (hyperlipidemia)  Blood clot due to device, implant, or graft  was on blood thinners  Diabetes  on insulin pump  HTN (hypertension)  CHF (congestive heart failure)  EF ~ 25%  History of celiac disease  Diverticulitis  STEMI (ST elevation myocardial infarction)  Diabetic neuropathy  Anxiety and depression  Other postprocedural status  Fixation hardware in foot LEFT  Stented coronary artery  10/18 heart attack  INFERIOR WALL MI  S/P CABG x 1  2018  S/P TKR (total knee replacement), right  with infection Mrsa   per pt he was cleared from MRSA infection  Surgery, elective  right knee wound debridement  History of open reduction and internal fixation (ORIF) procedure  H/O shoulder surgery  right  AICD (automatic cardioverter/defibrillator) present  Cholecystostomy care  drain inserted 2020 &amp; removed 4 months ago  History of tonsillectomy  History of hip replacement, total, right    Medications:  metoprolol succinate ER  oxyCODONE    IR  oxyCODONE    IR  insulin glargine Injectable (LANTUS)  multivitamin  magnesium sulfate  IVPB  nystatin Powder  simethicone  insulin lispro Injectable (ADMELOG)  prasugrel  gabapentin  insulin lispro Injectable (ADMELOG)  insulin glargine Injectable (LANTUS)  acetaminophen     Tablet ..  cyclobenzaprine  oxyCODONE    IR  oxyCODONE    IR  spironolactone  oxycodone    5 mG/acetaminophen 325 mG  HYDROmorphone  Injectable  oxycodone    5 mG/acetaminophen 325 mG  oxycodone    5 mG/acetaminophen 325 mG  HYDROmorphone  Injectable  insulin lispro Injectable (ADMELOG)  insulin glargine Injectable (LANTUS)  dextrose 50% Injectable  dextrose 50% Injectable  dextrose 5% + sodium chloride 0.45%.  HYDROmorphone  Injectable  insulin glargine Injectable (LANTUS)  simethicone  aspirin enteric coated  HYDROmorphone  Injectable  simethicone  magnesium oxide  HYDROmorphone  Injectable  acetaminophen     Tablet ..  oxycodone    5 mG/acetaminophen 325 mG  gabapentin  DULoxetine  insulin glargine Injectable (LANTUS)  acetaminophen   IVPB ..  HYDROmorphone  Injectable  melatonin  acetaminophen   IVPB ..  insulin glargine Injectable (LANTUS)  HYDROmorphone  Injectable  HYDROmorphone  Injectable  acetaminophen   IVPB ..  HYDROmorphone  Injectable  senna  insulin glargine Injectable (LANTUS)  magnesium sulfate  IVPB  potassium chloride    Tablet ER  pantoprazole    Tablet  levothyroxine  pantoprazole  Injectable  levothyroxine Injectable  fentaNYL   Infusion  norepinephrine Infusion  propofol Infusion  chlorhexidine 0.12% Liquid  potassium chloride  10 mEq/100 mL IVPB  potassium chloride    Tablet ER  insulin glargine Injectable (LANTUS)  insulin glargine Injectable (LANTUS)  DAPTOmycin IVPB  potassium chloride   Powder  HYDROmorphone  Injectable  oxyCODONE    IR  insulin lispro (ADMELOG) corrective regimen sliding scale  insulin glargine Injectable (LANTUS)  insulin lispro Injectable (ADMELOG)  HYDROmorphone  Injectable  vancomycin  IVPB  magnesium sulfate  IVPB  HYDROmorphone  Injectable  vancomycin  IVPB  magnesium sulfate  IVPB  HYDROmorphone  Injectable  oxyCODONE    IR  acetaminophen     Tablet ..  insulin lispro Injectable (ADMELOG)  insulin glargine Injectable (LANTUS)  oxyCODONE    IR  gabapentin  chlorhexidine 2% Cloths  sodium chloride 0.9% lock flush  acetaminophen     Tablet ..  HYDROmorphone  Injectable  magnesium sulfate  IVPB  insulin glargine Injectable (LANTUS)  insulin lispro Injectable (ADMELOG)  acetaminophen   IVPB ..  HYDROmorphone  Injectable  acetaminophen   IVPB ..  heparin   Injectable  HYDROmorphone  Injectable  lactated ringers.  lactated ringers.  potassium chloride    Tablet ER  spironolactone  chlorhexidine 2% Cloths  povidone iodine 5% Nasal Swab  lactated ringers.  vancomycin  IVPB  vancomycin  IVPB  insulin lispro Injectable (ADMELOG)  vancomycin  IVPB  vancomycin  IVPB  vancomycin  IVPB  enoxaparin Injectable  HYDROmorphone  Injectable  oxyCODONE    IR  oxyCODONE    IR  traMADol  oxyCODONE    IR  HYDROmorphone  Injectable  HYDROmorphone  Injectable  HYDROmorphone  Injectable  lactated ringers.  tobramycin Injectable  vancomycin  IVPB  cyclobenzaprine  acetaminophen     Tablet ..  oxyCODONE    IR  HYDROmorphone  Injectable  HYDROmorphone  Injectable  vancomycin  IVPB  vancomycin  IVPB  chlorhexidine 2% Cloths  povidone iodine 5% Nasal Swab  insulin glargine Injectable (LANTUS)  vancomycin  IVPB  glucagon  Injectable  dextrose 5%.  dextrose 50% Injectable  dextrose 50% Injectable  dextrose 50% Injectable  dextrose 5%.  dextrose 40% Gel  insulin lispro (ADMELOG) corrective regimen sliding scale  levothyroxine  pantoprazole    Tablet  furosemide    Tablet  ALPRAZolam  rifAMPin  atorvastatin  DULoxetine  aluminum hydroxide/magnesium hydroxide/simethicone Suspension  spironolactone  lactated ringers.  HYDROmorphone  Injectable  oxyCODONE    IR  oxyCODONE    IR    ROS: Const:  _N__febrile   Eyes:___ENT:___CV: __N_chest pain  Resp: __N__sob  GI:__N_nausea _N__vomiting __N__abd pain ___npo ___clears __Y_full diet __bm  :___ Musk: __Y_pain ___spasm  Skin:___ Neuro:  __N_sedation_N__confusion_N___ numbness ___weakness _N__paresthesia  Psych:___anxiety  Endo:___ Heme:___Allergy:carvedilol, enalapril, entresto, ACEi___    T(C): 35.8 (01-27-22 @ 06:38), Max: 36.3 (01-26-22 @ 18:41)  HR: 97 (01-27-22 @ 08:39) (86 - 97)  BP: 102/57 (01-27-22 @ 08:39) (102/57 - 125/59)  RR: 16 (01-27-22 @ 08:39) (15 - 16)  SpO2: 97% (01-27-22 @ 08:39) (97% - 98%)  Wt(kg): --     PHYSICAL EXAM:  Gen Appearance: _X__no acute distress _X__appropriate       Neuro: ___SILT feet____ EOM Intact Psych: AAOX_3_, _X__mood/affect appropriate        Eyes: _X__conjunctiva WNL  ___X__ Pupils equal and round        ENT: X___ears and nose atraumatic__X_ Hearing grossly intact        Neck: _X__trachea midline, no visible masses ___thyroid without palpable mass    Resp: _X__Nml WOB____No tactile fremitus ___clear to auscultation    Cardio: __X_extremities free from edema ____pedal pulses palpable    GI/Abdomen: ___soft _____ Nontender____X__Nondistended_____HSM    Lymphatic: ___no palpable nodes in neck  ___no palpable nodes calves and feet    Skin/Wound: ___Incision, _X__Dressing c/d/i,   ____surrounding tissues soft,  ___drain/chest tube present____    Muscular: EHL ___/5  Gastrocnemius___/5    _X__absent clubbing/cyanosis

## 2022-01-28 NOTE — PROCEDURAL SAFETY CHECKLIST WITH OR WITHOUT SEDATION - NSPREANESCONSENT_GEN_ALL_CORE
[Alert] : alert [Well Nourished] : well nourished [Healthy Appearance] : healthy appearance [No Acute Distress] : no acute distress [Well Developed] : well developed [Normal Pupil & Iris Size/Symmetry] : normal pupil and iris size and symmetry n/a [No Discharge] : no discharge [No Photophobia] : no photophobia [Sclera Not Icteric] : sclera not icteric [Normal TMs] : both tympanic membranes were normal [Normal Nasal Mucosa] : the nasal mucosa was normal [Normal Lips/Tongue] : the lips and tongue were normal [Normal Outer Ear/Nose] : the ears and nose were normal in appearance [Normal Tonsils] : normal tonsils [No Thrush] : no thrush [Clear Rhinorrhea] : clear rhinorrhea was seen [Supple] : the neck was supple [Normal Rate and Effort] : normal respiratory rhythm and effort [No Crackles] : no crackles [No Retractions] : no retractions [Bilateral Audible Breath Sounds] : bilateral audible breath sounds [Normal Rate] : heart rate was normal  [Normal S1, S2] : normal S1 and S2 [No murmur] : no murmur [Regular Rhythm] : with a regular rhythm [Soft] : abdomen soft [Not Tender] : non-tender [Not Distended] : not distended [No HSM] : no hepato-splenomegaly [Normal Cervical Lymph Nodes] : cervical [Skin Intact] : skin intact  [No Rash] : no rash [No Skin Lesions] : no skin lesions [No clubbing] : no clubbing [No Edema] : no edema [No Cyanosis] : no cyanosis [Normal Mood] : mood was normal [Normal Affect] : affect was normal [Alert, Awake, Oriented as Age-Appropriate] : alert, awake, oriented as age appropriate [Conjunctival Erythema] : no conjunctival erythema [Suborbital Bogginess] : no suborbital bogginess (allergic shiners) [Pale mucosa] : no pale mucosa [Boggy Nasal Turbinates] : no boggy and/or pale nasal turbinates [Pharyngeal erythema] : no pharyngeal erythema [Posterior Pharyngeal Cobblestoning] : no posterior pharyngeal cobblestoning [Exudate] : no exudate [Wheezing] : no wheezing was heard [Patches] : no patches [de-identified] : very minimal dermographism, no lesions.

## 2022-01-31 ENCOUNTER — RX RENEWAL (OUTPATIENT)
Age: 67
End: 2022-01-31

## 2022-01-31 DIAGNOSIS — K90.0 CELIAC DISEASE: ICD-10-CM

## 2022-01-31 DIAGNOSIS — E78.1 PURE HYPERGLYCERIDEMIA: ICD-10-CM

## 2022-01-31 DIAGNOSIS — D62 ACUTE POSTHEMORRHAGIC ANEMIA: ICD-10-CM
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
## 2022-01-31 DIAGNOSIS — F32.9 MAJOR DEPRESSIVE DISORDER, SINGLE EPISODE, UNSPECIFIED: ICD-10-CM

## 2022-01-31 DIAGNOSIS — E11.42 TYPE 2 DIABETES MELLITUS WITH DIABETIC POLYNEUROPATHY: ICD-10-CM

## 2022-01-31 DIAGNOSIS — Z96.41 PRESENCE OF INSULIN PUMP (EXTERNAL) (INTERNAL): ICD-10-CM

## 2022-01-31 DIAGNOSIS — Z79.4 LONG TERM (CURRENT) USE OF INSULIN: ICD-10-CM

## 2022-01-31 DIAGNOSIS — E03.9 HYPOTHYROIDISM, UNSPECIFIED: ICD-10-CM

## 2022-01-31 DIAGNOSIS — Z95.1 PRESENCE OF AORTOCORONARY BYPASS GRAFT: ICD-10-CM

## 2022-01-31 DIAGNOSIS — I25.10 ATHEROSCLEROTIC HEART DISEASE OF NATIVE CORONARY ARTERY WITHOUT ANGINA PECTORIS: ICD-10-CM

## 2022-01-31 DIAGNOSIS — T81.31XA DISRUPTION OF EXTERNAL OPERATION (SURGICAL) WOUND, NOT ELSEWHERE CLASSIFIED, INITIAL ENCOUNTER: ICD-10-CM

## 2022-01-31 DIAGNOSIS — I95.9 HYPOTENSION, UNSPECIFIED: ICD-10-CM

## 2022-01-31 DIAGNOSIS — I11.0 HYPERTENSIVE HEART DISEASE WITH HEART FAILURE: ICD-10-CM

## 2022-01-31 DIAGNOSIS — T84.53XA INFECTION AND INFLAMMATORY REACTION DUE TO INTERNAL RIGHT KNEE PROSTHESIS, INITIAL ENCOUNTER: ICD-10-CM

## 2022-01-31 DIAGNOSIS — I25.2 OLD MYOCARDIAL INFARCTION: ICD-10-CM

## 2022-01-31 DIAGNOSIS — B95.62 METHICILLIN RESISTANT STAPHYLOCOCCUS AUREUS INFECTION AS THE CAUSE OF DISEASES CLASSIFIED ELSEWHERE: ICD-10-CM

## 2022-01-31 DIAGNOSIS — Z95.810 PRESENCE OF AUTOMATIC (IMPLANTABLE) CARDIAC DEFIBRILLATOR: ICD-10-CM

## 2022-01-31 DIAGNOSIS — M00.061 STAPHYLOCOCCAL ARTHRITIS, RIGHT KNEE: ICD-10-CM

## 2022-01-31 DIAGNOSIS — F41.9 ANXIETY DISORDER, UNSPECIFIED: ICD-10-CM

## 2022-01-31 DIAGNOSIS — A41.9 SEPSIS, UNSPECIFIED ORGANISM: ICD-10-CM

## 2022-01-31 DIAGNOSIS — I50.22 CHRONIC SYSTOLIC (CONGESTIVE) HEART FAILURE: ICD-10-CM

## 2022-01-31 DIAGNOSIS — Z95.5 PRESENCE OF CORONARY ANGIOPLASTY IMPLANT AND GRAFT: ICD-10-CM

## 2022-01-31 DIAGNOSIS — E78.5 HYPERLIPIDEMIA, UNSPECIFIED: ICD-10-CM

## 2022-01-31 DIAGNOSIS — M19.90 UNSPECIFIED OSTEOARTHRITIS, UNSPECIFIED SITE: ICD-10-CM

## 2022-02-01 RX ORDER — EMPAGLIFLOZIN 10 MG/1
10 TABLET, FILM COATED ORAL
Qty: 60 | Refills: 0 | Status: DISCONTINUED | COMMUNITY
Start: 2021-12-12 | End: 2022-02-01

## 2022-02-04 ENCOUNTER — APPOINTMENT (OUTPATIENT)
Dept: VASCULAR SURGERY | Facility: CLINIC | Age: 67
End: 2022-02-04

## 2022-02-07 ENCOUNTER — RESULT REVIEW (OUTPATIENT)
Age: 67
End: 2022-02-07

## 2022-02-07 NOTE — H&P ADULT - FAMILY HISTORY
Subjective:       Patient ID: Yolis Clarke is a 35 y.o. female.    Chief Complaint:  Well Woman      History of Present Illness  HPI  Annual Exam-Premenopausal  Patient presents for annual exam. The patient has no complaints today.     GYN & OB History  Patient's last menstrual period was 2021.   Date of Last Pap: No result found    OB History    Para Term  AB Living   2       2     SAB IAB Ectopic Multiple Live Births                  # Outcome Date GA Lbr Celio/2nd Weight Sex Delivery Anes PTL Lv   2 AB            1 AB                Review of Systems  Review of Systems   Constitutional: Negative for activity change, appetite change, fatigue, fever and unexpected weight change.   HENT: Negative for nasal congestion and tinnitus.    Eyes: Negative for visual disturbance.   Respiratory: Negative for cough and shortness of breath.    Cardiovascular: Negative for chest pain and leg swelling.   Gastrointestinal: Negative for abdominal pain, bloating, blood in stool, constipation and diarrhea.   Genitourinary: Negative for bladder incontinence, dysuria, vaginal bleeding, vaginal discharge, vaginal pain, vaginal dryness and vaginal odor.   Musculoskeletal: Negative for arthralgias, back pain and joint swelling.   Integumentary:  Negative for acne.   Neurological: Negative for headaches.   Psychiatric/Behavioral: Negative for depression and sleep disturbance. The patient is not nervous/anxious.            Objective:    Physical Exam:   Constitutional: She is oriented to person, place, and time. Vital signs are normal. She appears well-developed and well-nourished. She is cooperative.      Neck: No thyroid mass and no thyromegaly present.    Cardiovascular: Normal rate, regular rhythm and normal pulses.     Pulmonary/Chest: Effort normal. No respiratory distress. Chest wall is not dull to percussion. She exhibits no mass, no bony tenderness, no laceration, no crepitus, no edema, no deformity, no  swelling and no retraction. Right breast exhibits no inverted nipple, no mass, no nipple discharge, no skin change, no tenderness, presence, no bleeding and no swelling. Left breast exhibits no inverted nipple, no mass, no nipple discharge, no skin change, no tenderness, presence, no bleeding and no swelling.        Abdominal: Soft. Normal appearance. She exhibits no distension. There is no abdominal tenderness. No hernia.     Genitourinary:    Vagina, uterus and rectum normal.      Pelvic exam was performed with patient supine.   Labial bartholins normal.There is no rash, tenderness, lesion or injury on the right labia. There is no rash, tenderness, lesion or injury on the left labia. Cervix is normal. Right adnexum displays no mass, no tenderness and no fullness. Left adnexum displays no mass, no tenderness and no fullness. No  no vaginal discharge, rectocele, cystocele or unspecified prolapse of vaginal walls in the vagina.           Musculoskeletal: Moves all extremeties.      Lymphadenopathy:        Right: No inguinal adenopathy present.        Left: No inguinal adenopathy present.    Neurological: She is alert and oriented to person, place, and time.    Skin: Skin is warm, dry and intact. No rash noted.    Psychiatric: She has a normal mood and affect. Her speech is normal and behavior is normal. Judgment and thought content normal. Cognition and memory are normal.          Assessment:        1. Encounter for surveillance of contraceptive pills     Well Woman Exam        Plan:      Routine care      Mother  Still living? Unknown  Family history of heart disease, Age at diagnosis: Age Unknown

## 2022-02-07 NOTE — PROGRESS NOTE ADULT - I HAVE PERSONALLY SEEN, EXAMINED, AND PARTICIPATED IN THE CARE OF THIS PATIENT.  I HAVE REVIEWED ALL PERTINENT CLINICAL INFORMATION, INCLUDING HISTORY, PHYSICAL EXAM, PLAN AND THE MEDICAL/PA/NP STUDENT’S NOTE AND AGREE EXCEPT AS NOTED.
Statement Selected
assessment of oropharyngeal swallow
Statement Selected
To reassess swallow function

## 2022-02-08 ENCOUNTER — APPOINTMENT (OUTPATIENT)
Dept: ENDOCRINOLOGY | Facility: CLINIC | Age: 67
End: 2022-02-08

## 2022-02-08 LAB
CULTURE RESULTS: SIGNIFICANT CHANGE UP
SPECIMEN SOURCE: SIGNIFICANT CHANGE UP

## 2022-02-10 NOTE — DISCHARGE NOTE ADULT - PLAN OF CARE
History and Physical    Patient's Name/Date of Birth: Dayron May /1933, (82 y.o.), male    Date: February 10, 2022     Assessment/Plan:  1. Intermittent Rectal Bleeding and History of Multiple Previous Colon Polyps - patient to sign release to get old colonoscopy records from [facility].  Normally would not recommend colonoscopy for colorectal screening, or any other screening test, after age 70-71 but patient does have symptoms (rectal bleeding) and history of colon polyps (although do not know what type of polyps). I recommended diagnostic colonoscopy with possible biopsy or polypectomy and explained the risk, benefits, expected outcome, and alternatives to the procedure. Risks included but are not limited to bleeding, infection, respiratory distress, hypotension, and perforation of the colon. The patient understands and is in agreement. 2. Enlarged prostate - most likely secondary to BPH. I did not feel any definite prostate nodules. Recommend patient be referred to urology as he is symptomatic. 3. Coronary artery disease, history of MI, status post coronary artery stents, first-degree AV block - patient seems to be stable from cardiac standpoint. However with his age and his history, he is at increased risk of medical complications with IV consultation. Patient understands this and is willing to accept this risk. 4. Essential hypertension - BP markedly elevated on today's examination. Patient will need to see his PCP to get this in better control prior to colonoscopy. 5. Hyperlipidemia  6. Chronic kidney disease - patient's last BUN and creatinine on 12/10/2021 was 36 and 1.7 respectively. 7. GERD  8. Spondylosis of cervical spine    Addendum:  My medical assistant obtained colonoscopy records from [facility] from 2/12/2016. Patient had colon polyps removed from the ascending colon and rectum.   Ascending colon polyp was a tubular adenoma while the rectal History:   Procedure Laterality Date    APPENDECTOMY  1960    CARDIAC CATHETERIZATION  10/2006    left main 20% ostial, LAD 20% prox, first diag 40% ostial, LCX 30%prox & 30%mid, RCA no significant disease    CARDIAC CATHETERIZATION  11/20/2011    patent stent    CARDIAC CATHETERIZATION  02/05/2016    patent stents, LM 20% ostial, Cx luminal irregularities--medical management    CARDIOVASCULAR STRESS TEST  10/03/2011    nuclear    CARDIOVASCULAR STRESS TEST  01/25/2016    CORONARY ANGIOPLASTY WITH STENT PLACEMENT Left 1999    stent to RCA  @ [facility]    TRANSTHORACIC ECHOCARDIOGRAM  01/24/2016    EF 55%, 1+TR & 1+ MR       Current Outpatient Medications   Medication Sig Dispense Refill    nitroGLYCERIN (NITROSTAT) 0.4 MG SL tablet Place 1 tablet under the tongue every 5 minutes as needed for Chest pain up to max of 3 total doses. If no relief after 1 dose, call 911. 25 tablet 3    benazepril (LOTENSIN) 20 MG tablet Take 1 tablet by mouth 2 times daily 180 tablet 3    atorvastatin (LIPITOR) 40 MG tablet Take 1 tablet by mouth daily 90 tablet 3    metoprolol (LOPRESSOR) 100 MG tablet Take 1 tablet by mouth 2 times daily 180 tablet 3    hydroCHLOROthiazide (MICROZIDE) 12.5 MG capsule Take 1 capsule by mouth every morning 90 capsule 3    isosorbide mononitrate (IMDUR) 60 MG extended release tablet Take 1 tablet by mouth daily 90 tablet 3    Multiple Vitamin (MULTI-VITAMIN DAILY PO) Take by mouth daily      aspirin 81 MG tablet Take 81 mg by mouth daily       No current facility-administered medications for this visit. No Known Allergies    Review of Systems  Non-contributory    Physical Exam:  Vitals:    02/10/22 1326   BP: (!) 174/84   Site: Right Upper Arm   Position: Sitting   Cuff Size: Large Adult   Pulse: 75   Resp: 16   Temp: 97.9 °F (36.6 °C)   TempSrc: Infrared   SpO2: 95%   Weight: 160 lb (72.6 kg)   Height: 5' 6\" (1.676 m)       Body mass index is 25.82 kg/m².     Physical Exam  Constitutional:       General: He is not in acute distress. Appearance: He is well-developed. He is not diaphoretic. HENT:      Head: Normocephalic and atraumatic. Eyes:      General:         Right eye: No discharge. Left eye: No discharge. Cardiovascular:      Rate and Rhythm: Normal rate and regular rhythm. Heart sounds: Normal heart sounds. No murmur heard. No friction rub. No gallop. Pulmonary:      Effort: Pulmonary effort is normal. No respiratory distress. Breath sounds: Normal breath sounds. No wheezing or rales. Chest:      Chest wall: No tenderness. Abdominal:      General: Bowel sounds are normal. There is no distension. Palpations: Abdomen is soft. Abdomen is not rigid. There is no mass. Tenderness: There is no abdominal tenderness. There is no guarding or rebound. Hernia: There is no hernia in the ventral area, left inguinal area or right inguinal area. Comments: Surgical scar RLQ from appendectomy   Genitourinary:     Penis: Normal.       Testes:         Right: Mass, tenderness or swelling not present. Left: Mass, tenderness or swelling not present. Prostate: Enlarged (right lobe markedly enlarged and firm but no nodules, left lobe hypotrophic). Not tender. Rectum: Guaiac result negative (Scant amount of hemoccult negative stool). No mass, tenderness, anal fissure, external hemorrhoid or internal hemorrhoid. Normal anal tone. Musculoskeletal:         General: No deformity. Normal range of motion. Cervical back: Normal range of motion. Skin:     General: Skin is warm and dry. Coloration: Skin is not pale. Findings: No erythema or rash. Neurological:      Mental Status: He is alert and oriented to person, place, and time.    Psychiatric:         Behavior: Behavior normal.         Judgment: Judgment normal.     Electronically signed by Nolan Schmidt MD on 2/10/22 at 1:52 PM EST Prevent further worsening Your SOB likely due to ??heart failure, please follow up with your cardiologist in 1-2 weeks and take meds as prescribed Prevent exacerbations Take low salt diet, please follow up with your cardiologist in 1-2 weeks and take meds as prescribed  If you experience more SOB, leg swelling take extra dose of lasix if didn't improve call 911. Your SOB likely due to a side effect of Brilinta, please take your Brilinta second dose tonight and begin taking Effient 10 mg daily starting tomorrow. Follow up with your cardiologist in 1-2 weeks and take the rest of the medications as prescribed. Take low salt diet, please follow up with your cardiologist in 1-2 weeks and take meds as prescribed  If you experience more SOB, leg swelling take extra dose of Lasix if unimproved call 911.

## 2022-02-11 ENCOUNTER — APPOINTMENT (OUTPATIENT)
Dept: VASCULAR SURGERY | Facility: CLINIC | Age: 67
End: 2022-02-11
Payer: COMMERCIAL

## 2022-02-11 ENCOUNTER — APPOINTMENT (OUTPATIENT)
Dept: SURGICAL ONCOLOGY | Facility: CLINIC | Age: 67
End: 2022-02-11
Payer: COMMERCIAL

## 2022-02-11 VITALS
TEMPERATURE: 97.6 F | HEART RATE: 80 BPM | DIASTOLIC BLOOD PRESSURE: 67 MMHG | HEIGHT: 73 IN | OXYGEN SATURATION: 98 % | SYSTOLIC BLOOD PRESSURE: 119 MMHG

## 2022-02-11 LAB
-  AMPHOTERICIN B: SIGNIFICANT CHANGE UP
-  ANIDULAFUNGIN: SIGNIFICANT CHANGE UP
-  CASPOFUNGIN: SIGNIFICANT CHANGE UP
-  FLUCONAZOLE: SIGNIFICANT CHANGE UP
-  INTERPRETATION: SIGNIFICANT CHANGE UP
-  MICAFUNGIN: SIGNIFICANT CHANGE UP
-  POSACONAZOLE: SIGNIFICANT CHANGE UP
-  VORICONAZOLE: SIGNIFICANT CHANGE UP
CULTURE RESULTS: SIGNIFICANT CHANGE UP
METHOD TYPE: SIGNIFICANT CHANGE UP
ORGANISM # SPEC MICROSCOPIC CNT: SIGNIFICANT CHANGE UP
ORGANISM # SPEC MICROSCOPIC CNT: SIGNIFICANT CHANGE UP
SPECIMEN SOURCE: SIGNIFICANT CHANGE UP

## 2022-02-11 PROCEDURE — 99214 OFFICE O/P EST MOD 30 MIN: CPT

## 2022-02-11 PROCEDURE — 99024 POSTOP FOLLOW-UP VISIT: CPT

## 2022-02-11 NOTE — HISTORY OF PRESENT ILLNESS
[de-identified] : Patient Name: FLOWER MARISCAL   1955 \par [place] MRN: [id number]  \par Radha MRN: \par Referring Provider:none \par Oncologist: \par \par Date of Visit: 2022 \par \par Diagnosis:s/p debridement fistula tract\par Operative date: 2021\par Procedure: I&D fistula tract\par Pathology: inflammatory tissue\par \par He is now 10 months since the debridement. At the time due to anasthesia risks and his cardiologist concern about general anasthesia, we did it under sedation and only debrided the subcutaneous tract to the underlying muscle. We did not go intra abdominal. Patient reports that about 6 weeks after surgery the drainage started again and it continues to happen intermittently. Unfortunately since we last met, he had more difficulties with his right knee and lower leg and ended up with an amputation. This was done under general anasthesia without issue here at . He is here today to be re evaluated for surgery to definitively take care of this problem. He had a CT scan earlier this week. He is off antibiotics.

## 2022-02-11 NOTE — ASSESSMENT
[FreeTextEntry1] : I) recurrent cholecystocutaneous fistula\par \par P) Discussed w patient that at this point the only fix is to go intra abdominally and at the very least debride the intra abdominal tract if not do a cholecystectomy. We did discuss the increased risk due to his cardiac issues and the chronic inflammation. It seems he did have GA on 3 occasions with only one time being an issue. He will see his cardiologist again and we will schedule him for a laparoscopic approach and based on intraop findings decide on the best course of treatment. Risks and benefits discussed, including but not limited to post op bleeding, infection, biliary injury and major cardiac events including death.  All questions answered.Will obtain preop MRI as well to facilitate surgical planning.\par \par Margarito King MD\par \par Chief Surgical Oncology\par Multidisciplinary GI cancer program\par [facility]\par [facility]\par \par Professor Surgery\par [employer]\par \par

## 2022-02-11 NOTE — PHYSICAL EXAM
[Normal] : well developed, well nourished, in no acute distress [de-identified] : scar over debridement tract right side. no cellulitis or discharge at this time

## 2022-02-16 PROCEDURE — 92610 EVALUATE SWALLOWING FUNCTION: CPT

## 2022-02-16 PROCEDURE — 97535 SELF CARE MNGMENT TRAINING: CPT

## 2022-02-16 PROCEDURE — 87070 CULTURE OTHR SPECIMN AEROBIC: CPT

## 2022-02-16 PROCEDURE — 85027 COMPLETE CBC AUTOMATED: CPT

## 2022-02-16 PROCEDURE — 87186 SC STD MICRODIL/AGAR DIL: CPT

## 2022-02-16 PROCEDURE — 97530 THERAPEUTIC ACTIVITIES: CPT

## 2022-02-16 PROCEDURE — 82550 ASSAY OF CK (CPK): CPT

## 2022-02-16 PROCEDURE — 82330 ASSAY OF CALCIUM: CPT

## 2022-02-16 PROCEDURE — U0005: CPT

## 2022-02-16 PROCEDURE — 85014 HEMATOCRIT: CPT

## 2022-02-16 PROCEDURE — 85730 THROMBOPLASTIN TIME PARTIAL: CPT

## 2022-02-16 PROCEDURE — C1889: CPT

## 2022-02-16 PROCEDURE — 84443 ASSAY THYROID STIM HORMONE: CPT

## 2022-02-16 PROCEDURE — 82947 ASSAY GLUCOSE BLOOD QUANT: CPT

## 2022-02-16 PROCEDURE — 84484 ASSAY OF TROPONIN QUANT: CPT

## 2022-02-16 PROCEDURE — C8925: CPT

## 2022-02-16 PROCEDURE — 87116 MYCOBACTERIA CULTURE: CPT

## 2022-02-16 PROCEDURE — U0003: CPT

## 2022-02-16 PROCEDURE — 36415 COLL VENOUS BLD VENIPUNCTURE: CPT

## 2022-02-16 PROCEDURE — 97161 PT EVAL LOW COMPLEX 20 MIN: CPT

## 2022-02-16 PROCEDURE — C8929: CPT

## 2022-02-16 PROCEDURE — 36430 TRANSFUSION BLD/BLD COMPNT: CPT

## 2022-02-16 PROCEDURE — 82962 GLUCOSE BLOOD TEST: CPT

## 2022-02-16 PROCEDURE — 88307 TISSUE EXAM BY PATHOLOGIST: CPT

## 2022-02-16 PROCEDURE — 83036 HEMOGLOBIN GLYCOSYLATED A1C: CPT

## 2022-02-16 PROCEDURE — 93005 ELECTROCARDIOGRAM TRACING: CPT

## 2022-02-16 PROCEDURE — 87206 SMEAR FLUORESCENT/ACID STAI: CPT

## 2022-02-16 PROCEDURE — A9556: CPT

## 2022-02-16 PROCEDURE — 73560 X-RAY EXAM OF KNEE 1 OR 2: CPT

## 2022-02-16 PROCEDURE — 85652 RBC SED RATE AUTOMATED: CPT

## 2022-02-16 PROCEDURE — 86850 RBC ANTIBODY SCREEN: CPT

## 2022-02-16 PROCEDURE — 87075 CULTR BACTERIA EXCEPT BLOOD: CPT

## 2022-02-16 PROCEDURE — 84132 ASSAY OF SERUM POTASSIUM: CPT

## 2022-02-16 PROCEDURE — 87205 SMEAR GRAM STAIN: CPT

## 2022-02-16 PROCEDURE — 80053 COMPREHEN METABOLIC PANEL: CPT

## 2022-02-16 PROCEDURE — 88311 DECALCIFY TISSUE: CPT

## 2022-02-16 PROCEDURE — P9016: CPT

## 2022-02-16 PROCEDURE — 78802 RP LOCLZJ TUM WHBDY 1 D IMG: CPT

## 2022-02-16 PROCEDURE — 97116 GAIT TRAINING THERAPY: CPT

## 2022-02-16 PROCEDURE — 84295 ASSAY OF SERUM SODIUM: CPT

## 2022-02-16 PROCEDURE — C9399: CPT

## 2022-02-16 PROCEDURE — 86923 COMPATIBILITY TEST ELECTRIC: CPT

## 2022-02-16 PROCEDURE — 85025 COMPLETE CBC W/AUTO DIFF WBC: CPT

## 2022-02-16 PROCEDURE — 73700 CT LOWER EXTREMITY W/O DYE: CPT

## 2022-02-16 PROCEDURE — 85610 PROTHROMBIN TIME: CPT

## 2022-02-16 PROCEDURE — 71045 X-RAY EXAM CHEST 1 VIEW: CPT

## 2022-02-16 PROCEDURE — 84100 ASSAY OF PHOSPHORUS: CPT

## 2022-02-16 PROCEDURE — 86140 C-REACTIVE PROTEIN: CPT

## 2022-02-16 PROCEDURE — 93312 ECHO TRANSESOPHAGEAL: CPT

## 2022-02-16 PROCEDURE — 80202 ASSAY OF VANCOMYCIN: CPT

## 2022-02-16 PROCEDURE — C1713: CPT

## 2022-02-16 PROCEDURE — 83735 ASSAY OF MAGNESIUM: CPT

## 2022-02-16 PROCEDURE — 87102 FUNGUS ISOLATION CULTURE: CPT

## 2022-02-16 PROCEDURE — 87015 SPECIMEN INFECT AGNT CONCNTJ: CPT

## 2022-02-16 PROCEDURE — 84439 ASSAY OF FREE THYROXINE: CPT

## 2022-02-16 PROCEDURE — 80048 BASIC METABOLIC PNL TOTAL CA: CPT

## 2022-02-16 PROCEDURE — 86901 BLOOD TYPING SEROLOGIC RH(D): CPT

## 2022-02-16 PROCEDURE — 83605 ASSAY OF LACTIC ACID: CPT

## 2022-02-16 PROCEDURE — 97110 THERAPEUTIC EXERCISES: CPT

## 2022-02-16 PROCEDURE — 82803 BLOOD GASES ANY COMBINATION: CPT

## 2022-02-16 PROCEDURE — 87040 BLOOD CULTURE FOR BACTERIA: CPT

## 2022-02-16 PROCEDURE — 36573 INSJ PICC RS&I 5 YR+: CPT

## 2022-02-16 PROCEDURE — 86900 BLOOD TYPING SEROLOGIC ABO: CPT

## 2022-02-16 NOTE — DISCHARGE NOTE NURSING/CASE MANAGEMENT/SOCIAL WORK - CAREGIVER ADDRESS
/ Consent: The rationale for the repair was explained to the patient and consent was obtained. The risks, benefits and alternatives to therapy were discussed, including but not limited to, the risks of infection, scarring, bleeding, prolonged wound healing, partial or full flap loss/necrosis, venous congestion, contour deformity/dog ears, hypertrophic/keloid scar, damage to nearby structures, incomplete removal, allergy to anesthesia, nerve injury, need for further procedure, and recurrence were addressed. Prior to the procedure, the treatment site was clearly identified and confirmed by the patient. All components of Universal Protocol/PAUSE Rule completed.

## 2022-02-17 ENCOUNTER — APPOINTMENT (OUTPATIENT)
Dept: ENDOCRINOLOGY | Facility: CLINIC | Age: 67
End: 2022-02-17
Payer: MEDICARE

## 2022-02-17 DIAGNOSIS — T81.49XA INFECTION FOLLOWING A PROCEDURE, OTHER SURGICAL SITE, INITIAL ENCOUNTER: ICD-10-CM

## 2022-02-17 DIAGNOSIS — K82.3 FISTULA OF GALLBLADDER: ICD-10-CM

## 2022-02-17 PROCEDURE — 99441: CPT

## 2022-02-17 NOTE — PHYSICAL EXAM
[Alert] : alert [No Acute Distress] : no acute distress [No Proptosis] : no proptosis [No Lid Lag] : no lid lag [Thyroid Not Enlarged] : the thyroid was not enlarged [No Thyroid Nodules] : no palpable thyroid nodules [No Respiratory Distress] : no respiratory distress [No Accessory Muscle Use] : no accessory muscle use [No Murmurs] : no murmurs [Regular Rhythm] : with a regular rhythm [No Edema] : no peripheral edema [Soft] : abdomen soft [No Stigmata of Cushings Syndrome] : no stigmata of Cushings Syndrome [Abdominal Striae] : no abdominal striae [de-identified] : in a wheelchair  [de-identified] : significant muscle atrophy, no kne over right joint

## 2022-02-28 ENCOUNTER — NON-APPOINTMENT (OUTPATIENT)
Age: 67
End: 2022-02-28

## 2022-03-11 ENCOUNTER — NON-APPOINTMENT (OUTPATIENT)
Age: 67
End: 2022-03-11

## 2022-03-11 ENCOUNTER — APPOINTMENT (OUTPATIENT)
Dept: UROLOGY | Facility: CLINIC | Age: 67
End: 2022-03-11
Payer: COMMERCIAL

## 2022-03-11 ENCOUNTER — LABORATORY RESULT (OUTPATIENT)
Age: 67
End: 2022-03-11

## 2022-03-11 DIAGNOSIS — R35.0 FREQUENCY OF MICTURITION: ICD-10-CM

## 2022-03-11 DIAGNOSIS — K80.10 CALCULUS OF GALLBLADDER WITH CHRONIC CHOLECYSTITIS W/OUT OBSTRUCTION: ICD-10-CM

## 2022-03-11 DIAGNOSIS — R33.9 RETENTION OF URINE, UNSPECIFIED: ICD-10-CM

## 2022-03-11 PROCEDURE — 99204 OFFICE O/P NEW MOD 45 MIN: CPT

## 2022-03-11 NOTE — ASSESSMENT
[FreeTextEntry1] : 1. 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 Possible BPH obstruction \par 3. Frequency, urgency, incomplete emptying\par 4. Multiple other problems, for cholecystectomy\par 5. poorly controlled DM\par 6. Peripheral neuropathy, peripheral vascular disease \par \par \par Plan: \par 1.UCX and sensitivity from today  \par 2. Renal BLader US\par 3. Video UDS after cholecystectomy and he is recovered\par 4. Obtain a PSA level in the future\par \par 45 minutes spent wit the pt. \par All of his questions answered.

## 2022-03-11 NOTE — PHYSICAL EXAM
[General Appearance - Well Nourished] : well nourished [Abdomen Soft] : soft [Abdomen Mass (___ Cm)] : no abdominal mass palpated [Costovertebral Angle Tenderness] : no ~M costovertebral angle tenderness [Oriented To Time, Place, And Person] : oriented to person, place, and time [Affect] : the affect was normal [Not Anxious] : not anxious [FreeTextEntry1] : wheelchair bound

## 2022-03-11 NOTE — HISTORY OF PRESENT ILLNESS
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 \par a1c~9, previously regulated better. \par \par I could not find positive UCx in the lab works in the past.\par Pt is complaining of: Burning at times, cloudy urine, stream feels reasonable, does not last very long. \par No gross hematuria, no UCx positive, no Hx of stones in the past.\par \par Past Surgical Hx: Rt above knee amputation, shoulder surgery, total knee replacement in the rt. \par \par Urine Dip: nitrate negative, glucosuria. \par \par YZO=255 cc from 3/11/2022:

## 2022-03-17 ENCOUNTER — NON-APPOINTMENT (OUTPATIENT)
Age: 67
End: 2022-03-17

## 2022-03-21 ENCOUNTER — LABORATORY RESULT (OUTPATIENT)
Age: 67
End: 2022-03-21

## 2022-03-21 LAB — BACTERIA UR CULT: ABNORMAL

## 2022-03-22 LAB
PSA FREE FLD-MCNC: 16 %
PSA FREE SERPL-MCNC: 0.64 NG/ML
PSA SERPL-MCNC: 4.03 NG/ML

## 2022-03-28 NOTE — PATIENT PROFILE ADULT - ABILITY TO HEAR (WITH HEARING AID OR HEARING APPLIANCE IF NORMALLY USED):
Attempt #1. Called patient and patient's daughter to schedule CTA ordered by Dr. Li. No answer. Left voice message for call back.     Adequate: hears normal conversation without difficulty

## 2022-04-06 NOTE — BEHAVIORAL HEALTH ASSESSMENT NOTE - TELEPSYCHIATRY?
No [Negative] : Heme/Lymph [FreeTextEntry2] : has parkinsons [FreeTextEntry7] : dysphagia [FreeTextEntry8] : BPH [FreeTextEntry9] : ataxia [de-identified] : superficial sacral ulceration

## 2022-04-07 ENCOUNTER — NON-APPOINTMENT (OUTPATIENT)
Age: 67
End: 2022-04-07

## 2022-04-07 ENCOUNTER — LABORATORY RESULT (OUTPATIENT)
Age: 67
End: 2022-04-07

## 2022-04-11 ENCOUNTER — APPOINTMENT (OUTPATIENT)
Dept: NEUROLOGY | Facility: CLINIC | Age: 67
End: 2022-04-11
Payer: MEDICARE

## 2022-04-11 PROCEDURE — 95816 EEG AWAKE AND DROWSY: CPT

## 2022-04-13 ENCOUNTER — NON-APPOINTMENT (OUTPATIENT)
Age: 67
End: 2022-04-13

## 2022-04-14 ENCOUNTER — APPOINTMENT (OUTPATIENT)
Dept: NEUROLOGY | Facility: CLINIC | Age: 67
End: 2022-04-14

## 2022-04-15 ENCOUNTER — NON-APPOINTMENT (OUTPATIENT)
Age: 67
End: 2022-04-15

## 2022-04-15 LAB
APPEARANCE: ABNORMAL
BACTERIA UR CULT: ABNORMAL
BILIRUBIN URINE: NEGATIVE
BLOOD URINE: ABNORMAL
COLOR: YELLOW
GLUCOSE QUALITATIVE U: ABNORMAL
KETONES URINE: NEGATIVE
LEUKOCYTE ESTERASE URINE: ABNORMAL
NITRITE URINE: NEGATIVE
PH URINE: 6
PROTEIN URINE: ABNORMAL
SPECIFIC GRAVITY URINE: 1.02
UROBILINOGEN URINE: NORMAL

## 2022-04-18 NOTE — PROGRESS NOTE BEHAVIORAL HEALTH - RISK ASSESSMENT
Yes
Risk factors include acute medical illness, altered mental status, recent paranoid thought process, disorientation.   Protective factors include social support from wife and daughter, future oriented towards returning to work, no history of suicidality or IPP admissions, no acute suicidality, access to emergency medical services and known to utilize them in times of distress.  Based on above patient is at chronically elevated risk but does not warrant IPP admission at this time.
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
## 2022-04-20 ENCOUNTER — NON-APPOINTMENT (OUTPATIENT)
Age: 67
End: 2022-04-20

## 2022-04-22 ENCOUNTER — NON-APPOINTMENT (OUTPATIENT)
Age: 67
End: 2022-04-22

## 2022-04-25 ENCOUNTER — APPOINTMENT (OUTPATIENT)
Dept: NEUROLOGY | Facility: CLINIC | Age: 67
End: 2022-04-25

## 2022-04-28 NOTE — CONSULT NOTE ADULT - PROBLEM SELECTOR PROBLEM 2
valveADULT [address] [facility]    NAME: Helen Segura  AGE: 42 y o  SEX: male  : 1976     DATE: 2022     Assessment and Plan:     Problem List Items Addressed This Visit     None          Immunizations and preventive care screenings were discussed with patient today  Appropriate education was printed on patient's after visit summary  Counseling:  · {Annual Physical; Counselin}         No follow-ups on file  Chief Complaint:     Chief Complaint   Patient presents with    Medication Management     Med refills    Physical Exam      History of Present Illness:     Adult Annual Physical   Patient here for a comprehensive physical exam  The patient reports {problems:23205}  Diet and Physical Activity  · Diet/Nutrition: {annual physical; diet:22108238}  · Exercise: {annual physical; exercise:2102}  Depression Screening  PHQ-2/9 Depression Screening         General Health  · Sleep: {annual physical; sleep:2102}  · Hearing: {annual physical; hearin}  · Vision: {annual physical; vision:}  · Dental: {annual physical; dental:}          Health  · Symptoms include: {annual physical; urinary symptoms:30731::"none"}     Review of Systems:     Review of Systems   Past Medical History:     Past Medical History:   Diagnosis Date    ADHD (attention deficit hyperactivity disorder)     Benign essential hypertension     last assessed: 17    Bursitis of right knee     last assessed: 03/31/15    Chronic kidney disease     Herniated disc, cervical     last assessed: 03/31/15    Hypercholesteremia     Hyperlipidemia     Hypertension     Rotator cuff syndrome, left     last assessed: 03/31/15      Past Surgical History:     Past Surgical History:   Procedure Laterality Date    NO PAST SURGERIES        Family History:     Family History   Problem Relation Age of Onset    Other Mother         Dyslipidemia    Arthritis Mother     Asthma Mother     Hypertension Mother     Aneurysm Father     Hypertension Father     Hypertension Sister     Diverticulitis Other     Hypertension Other       Social History:     Social History     Socioeconomic History    Marital status: /Civil Union     Spouse name: Not on file    Number of children: Not on file    Years of education: Not on file    Highest education level: Not on file   Occupational History     Employer: [employer]     Employer: [employer]   Tobacco Use    Smoking status: Former Smoker     Types: Cigarettes    Smokeless tobacco: Former User    Tobacco comment: Former smoker per allscripts   Vaping Use    Vaping Use: Never used   Substance and Sexual Activity    Alcohol use: No     Comment: Social per allscripts    Drug use: No    Sexual activity: Yes   Other Topics Concern    Not on file   Social History Narrative    Exposure to mold     Social Determinants of Health     Financial Resource Strain: Not on file   Food Insecurity: Not on file   Transportation Needs: Not on file   Physical Activity: Not on file   Stress: Not on file   Social Connections: Not on file   Intimate Partner Violence: Not on file   Housing Stability: Not on file      Current Medications:     Current Outpatient Medications   Medication Sig Dispense Refill    amphetamine-dextroamphetamine (ADDERALL) 30 MG tablet Take 1 tablet (30 mg total) by mouth daily Max Daily Amount: 30 mg 30 tablet 0    buPROPion (WELLBUTRIN XL) 150 mg 24 hr tablet Take 1 tablet (150 mg total) by mouth daily 30 tablet 0    buPROPion (WELLBUTRIN XL) 300 mg 24 hr tablet Take 1 tablet (300 mg total) by mouth daily 30 tablet 0    D3-50 1 25 MG (56604 UT) capsule TAKE 1 CAPSULE (50,000 UNITS TOTAL) BY MOUTH DAILY 12 capsule 0    fluticasone (FLONASE) 50 mcg/act nasal spray SPRAY 2 SPRAYS INTO EACH NOSTRIL EVERY DAY 16 mL 1    losartan-hydrochlorothiazide (HYZAAR) 100-25 MG per tablet TAKE 1 TABLET BY MOUTH EVERY DAY 90 tablet 1    omeprazole (PriLOSEC) 40 MG capsule TAKE 1 CAPSULE BY MOUTH EVERY DAY 30 capsule 2    propranolol (INDERAL LA) 60 mg 24 hr capsule TAKE 1 CAPSULE BY MOUTH EVERY DAY 30 capsule 1    traMADol (ULTRAM) 50 mg tablet Take 1 tablet (50 mg total) by mouth daily as needed for moderate pain 10 tablet 0     No current facility-administered medications for this visit        Allergies:     No Known Allergies   Physical Exam:     Ht 5' 3" (1 6 m)   BMI 20 65 kg/m²     Physical Exam     [place] Pyogenic arthritis of right knee joint, due to unspecified organism

## 2022-05-06 NOTE — ED PROVIDER NOTE - COVID-19 RESULT DATE/TIME
Delivery Note    Za Hawley is a 38 year old now  female post-delivery at 39w3d.  Pregnancy was significant for PIH, advanced maternal age and obesity.    Mother's Information    Delivery Blood Loss  22 - 22 0426    None           Freddy Hawley [[id number]]    [place] Delivery    Birth date/time: 2020 04:10:00  Delivery type:   Complications: Fetal Intolerance     Labor Events     labor?: No   steroids?: None  Antibiotics during labor?: No  Sac identifier: Sac 1  Rupture date/time: 2022  Rupture type: Artificial  Fluid color: Clear  Fluid odor: Normal  Labor type: Induced Onset of Labor  Trial of labor?: Yes  Induction: Oxytocin  Induction date/time: 2022 1530  Induction indications: Elective  Complications: Fetal Intolerance     Anesthesia    Method: Epidural     Placenta    Placenta delivery date/time:   Placenta removal:      Apgars    Living status:   Apgars:  1 min.:  5 min.:  10 min.:  15 min.:  20 min.:    Skin color:         Heart rate:         Reflex irritability:         Muscle tone:         Respiratory effort:         Total:            Measurements    Weight:   Length:      Delivery Providers    Delivering clinician:               Review the Delivery Report for details       Chapito Vale Do, DO      
07-Apr-2021 14:24

## 2022-05-10 NOTE — DISCHARGE NOTE ADULT - NS MD DC FALL RISK RISK
Please refill stiolto inhaler  Patient is out and needs ASAP
For information on Fall & Injury Prevention, visit [email or web address]

## 2022-05-11 ENCOUNTER — OUTPATIENT (OUTPATIENT)
Dept: OUTPATIENT SERVICES | Facility: HOSPITAL | Age: 67
LOS: 1 days | Discharge: HOME | End: 2022-05-11

## 2022-05-11 ENCOUNTER — NON-APPOINTMENT (OUTPATIENT)
Age: 67
End: 2022-05-11

## 2022-05-11 ENCOUNTER — APPOINTMENT (OUTPATIENT)
Dept: NUTRITION | Facility: CLINIC | Age: 67
End: 2022-05-11

## 2022-05-11 DIAGNOSIS — Z98.890 OTHER SPECIFIED POSTPROCEDURAL STATES: Chronic | ICD-10-CM

## 2022-05-11 DIAGNOSIS — Z95.1 PRESENCE OF AORTOCORONARY BYPASS GRAFT: Chronic | ICD-10-CM

## 2022-05-11 DIAGNOSIS — Z96.651 PRESENCE OF RIGHT ARTIFICIAL KNEE JOINT: Chronic | ICD-10-CM

## 2022-05-11 DIAGNOSIS — Z43.4 ENCOUNTER FOR ATTENTION TO OTHER ARTIFICIAL OPENINGS OF DIGESTIVE TRACT: Chronic | ICD-10-CM

## 2022-05-11 DIAGNOSIS — Z95.5 PRESENCE OF CORONARY ANGIOPLASTY IMPLANT AND GRAFT: Chronic | ICD-10-CM

## 2022-05-11 DIAGNOSIS — Z96.641 PRESENCE OF RIGHT ARTIFICIAL HIP JOINT: Chronic | ICD-10-CM

## 2022-05-11 DIAGNOSIS — Z41.9 ENCOUNTER FOR PROCEDURE FOR PURPOSES OTHER THAN REMEDYING HEALTH STATE, UNSPECIFIED: Chronic | ICD-10-CM

## 2022-05-11 DIAGNOSIS — Z95.810 PRESENCE OF AUTOMATIC (IMPLANTABLE) CARDIAC DEFIBRILLATOR: Chronic | ICD-10-CM

## 2022-05-11 DIAGNOSIS — Z90.89 ACQUIRED ABSENCE OF OTHER ORGANS: Chronic | ICD-10-CM

## 2022-05-16 ENCOUNTER — NON-APPOINTMENT (OUTPATIENT)
Age: 67
End: 2022-05-16

## 2022-05-17 ENCOUNTER — APPOINTMENT (OUTPATIENT)
Dept: UROLOGY | Facility: CLINIC | Age: 67
End: 2022-05-17

## 2022-05-18 ENCOUNTER — LABORATORY RESULT (OUTPATIENT)
Age: 67
End: 2022-05-18

## 2022-05-18 DIAGNOSIS — E11.65 TYPE 2 DIABETES MELLITUS WITH HYPERGLYCEMIA: ICD-10-CM

## 2022-05-20 ENCOUNTER — NON-APPOINTMENT (OUTPATIENT)
Age: 67
End: 2022-05-20

## 2022-05-23 ENCOUNTER — NON-APPOINTMENT (OUTPATIENT)
Age: 67
End: 2022-05-23

## 2022-05-23 LAB — BACTERIA UR CULT: NORMAL

## 2022-05-31 ENCOUNTER — APPOINTMENT (OUTPATIENT)
Dept: SURGICAL ONCOLOGY | Facility: CLINIC | Age: 67
End: 2022-05-31

## 2022-06-07 ENCOUNTER — APPOINTMENT (OUTPATIENT)
Dept: MRI IMAGING | Facility: HOSPITAL | Age: 67
End: 2022-06-07

## 2022-06-07 ENCOUNTER — APPOINTMENT (OUTPATIENT)
Dept: HEART AND VASCULAR | Facility: CLINIC | Age: 67
End: 2022-06-07

## 2022-06-07 ENCOUNTER — OUTPATIENT (OUTPATIENT)
Dept: OUTPATIENT SERVICES | Facility: HOSPITAL | Age: 67
LOS: 1 days | End: 2022-06-07
Payer: MEDICARE

## 2022-06-07 DIAGNOSIS — Z90.89 ACQUIRED ABSENCE OF OTHER ORGANS: Chronic | ICD-10-CM

## 2022-06-07 DIAGNOSIS — Z98.890 OTHER SPECIFIED POSTPROCEDURAL STATES: Chronic | ICD-10-CM

## 2022-06-07 DIAGNOSIS — Z95.5 PRESENCE OF CORONARY ANGIOPLASTY IMPLANT AND GRAFT: Chronic | ICD-10-CM

## 2022-06-07 DIAGNOSIS — Z95.1 PRESENCE OF AORTOCORONARY BYPASS GRAFT: Chronic | ICD-10-CM

## 2022-06-07 DIAGNOSIS — Z96.651 PRESENCE OF RIGHT ARTIFICIAL KNEE JOINT: Chronic | ICD-10-CM

## 2022-06-07 DIAGNOSIS — Z96.641 PRESENCE OF RIGHT ARTIFICIAL HIP JOINT: Chronic | ICD-10-CM

## 2022-06-07 DIAGNOSIS — Z41.9 ENCOUNTER FOR PROCEDURE FOR PURPOSES OTHER THAN REMEDYING HEALTH STATE, UNSPECIFIED: Chronic | ICD-10-CM

## 2022-06-07 DIAGNOSIS — Z95.810 PRESENCE OF AUTOMATIC (IMPLANTABLE) CARDIAC DEFIBRILLATOR: Chronic | ICD-10-CM

## 2022-06-07 DIAGNOSIS — Z43.4 ENCOUNTER FOR ATTENTION TO OTHER ARTIFICIAL OPENINGS OF DIGESTIVE TRACT: Chronic | ICD-10-CM

## 2022-06-07 PROCEDURE — 74183 MRI ABD W/O CNTR FLWD CNTR: CPT | Mod: 26,MH

## 2022-06-07 PROCEDURE — A9585: CPT

## 2022-06-07 PROCEDURE — 74183 MRI ABD W/O CNTR FLWD CNTR: CPT

## 2022-06-09 ENCOUNTER — APPOINTMENT (OUTPATIENT)
Dept: NEUROLOGY | Facility: CLINIC | Age: 67
End: 2022-06-09

## 2022-06-09 NOTE — HISTORY OF PRESENT ILLNESS
[Home] : at home, [unfilled] , at the time of the visit. [Medical Office: (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, and history of cholecystostomy tube placement in February 2020 for acute cholecystitis s/p removal in May 2020 with multiple presentations including persistent bilious drainage from the prior tract site as well as a RUQ abdominal abscess at the site s/p drainage, septic arthritis in June, 2021 where they performed arthroscopic drainage, now for follow up insulin dependant diabetes \par \par \par Diagnosis  >30 years \par Current Regimen:  insulin Humalog in pump \par Previous regimens: was on po meds but taken off , has been on insulin pump for 5-6 years now  \par Compliance: ok , NOT using bolus wizard \par SMBG/CGM :  has Freestyle storm 14 days \par Hypoglycemia:no more \par Polyuria/polydipsia : no \par Weight change/BMI: yes lost \par Diet: breakfast  dinner , occasional lunch \par Exercise: very limited due to patient recent medical problems with knee replacement and hip fracture , now in a wheelchair \par HBa1c trend: not available \par  \par \par prevention  \par Statin: yes rosuvastatin 40 mg \par ACE/ARB :\par Eye examination: yes \par Neuropathy: yes on cymbalta and gabapentin \par \par \par 2/2022: since last visit patient had unfortunately complication with the right foot and ended up with amputation, when in hospital and rehab was on MDI , now back on the pump with a basal rate of 2 units /hr reports good numbers and no major hypo/hyperglycemia, is not using bolus wizard and , has some 200s post prandial \par off jardiance \par \par   [Hypoglycemia] : Patient is not hypoglycemic.

## 2022-06-09 NOTE — DATA REVIEWED
[FreeTextEntry1] : 11/2021: TSH 8.08  FT4 0.9  fructosamine 340 LDL 57 \par 2/4/22: glucose 199 hb 8.8  crea 1.2  GFR 64  AST 18 ALT 25

## 2022-06-09 NOTE — REVIEW OF SYSTEMS
[Fatigue] : fatigue [Blurred Vision] : blurred vision [Pain/Numbness of Digits] : pain/numbness of digits [Recent Weight Gain (___ Lbs)] : no recent weight gain [Recent Weight Loss (___ Lbs)] : no recent weight loss [Fever] : no fever [Dysphagia] : no dysphagia [Neck Pain] : no neck pain [Dysphonia] : no dysphonia [Chest Pain] : no chest pain [Palpitations] : no palpitations [Fast Heart Rate] : heart rate is not fast [Shortness Of Breath] : no shortness of breath [Wheezing] : no wheezing [SOB on Exertion] : no shortness of breath on exertion [Nausea] : no nausea [Constipation] : no constipation [Vomiting] : no vomiting [Diarrhea] : no diarrhea [Muscle Weakness] : no muscle weakness [Acne] : no acne [Dizziness] : no dizziness [Cold Intolerance] : no cold intolerance

## 2022-06-09 NOTE — ADDENDUM
[FreeTextEntry1] : PATIENT IS CHECKING BLOOD GLUCOSE 5-6 TIMES/DAY , HE IS ON INSULIN PUMP  AND USED BLOOD GLUCOSE LEVELS TO ADJUST INSULIN DOSE AND GIVE ADEQUATE INSULIN AMOUNT \par

## 2022-06-09 NOTE — REASON FOR VISIT
[Follow - Up] : a follow-up visit [DM Type 1] : DM Type 1 [FreeTextEntry2] :  used to follow with endocrinology at [facility]

## 2022-06-09 NOTE — ASSESSMENT
[Diabetes Foot Care] : diabetes foot care [Long Term Vascular Complications] : long term vascular complications of diabetes [Exercise/Effect on Glucose] : exercise/effect on glucose [Self Monitoring of Blood Glucose] : self monitoring of blood glucose [Importance of Diet and Exercise] : importance of diet and exercise to improve glycemic control, achieve weight loss and improve cardiovascular health [FreeTextEntry1] : 66 year old male with PMH of CAD s/p MI, PCI/CABG, CHFrEF (15-20%), has ICD, HTN, celiac disease, insulin dependant DM  , neuropathy, chronic b/l LE ulcers presents, severe chronic pain, 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, and history of cholecystostomy tube placement in February 2020 for acute cholecystitis s/p removal in May 2020 with multiple presentations including persistent bilious drainage from the prior tract site as well as a RUQ abdominal abscess at the site s/p drainage, septic arthritis in June, 2021 where they performed arthroscopic drainage, now for follow up insulin dependant diabetes \par \par \par #insulin dependant, type 1 DM \par - since discharge from the hospital , he is back on the pump, at 2 units /hr and reports numbers ok, few 200s with meals that he is bolusing for , no major hypoglycemia , not using bolus wizard and at that time has a lot going on that he is unable to learn and start using it so will stick to his usual \par - patient is on insulin pump and need to check FS at least 5-6 times/day and adjust insulin dosage based on FS \par - advised to keep holding jardiance given recent amputation and might need different surgeries \par - Continue statin \par  - need labs and f/u in 3months

## 2022-06-13 ENCOUNTER — LABORATORY RESULT (OUTPATIENT)
Age: 67
End: 2022-06-13

## 2022-06-14 LAB
ALBUMIN SERPL ELPH-MCNC: 4.1 G/DL
ALP BLD-CCNC: 131 U/L
ALT SERPL-CCNC: 19 U/L
ANION GAP SERPL CALC-SCNC: 16 MMOL/L
APTT BLD: 26.8 SEC
AST SERPL-CCNC: 17 U/L
BASOPHILS # BLD AUTO: 0.1 K/UL
BASOPHILS NFR BLD AUTO: 1.1 %
BILIRUB DIRECT SERPL-MCNC: 0.2 MG/DL
BILIRUB INDIRECT SERPL-MCNC: 0.4 MG/DL
BILIRUB SERPL-MCNC: 0.6 MG/DL
BUN SERPL-MCNC: 30 MG/DL
CALCIUM SERPL-MCNC: 9 MG/DL
CHLORIDE SERPL-SCNC: 98 MMOL/L
CO2 SERPL-SCNC: 22 MMOL/L
CREAT SERPL-MCNC: 1.07 MG/DL
EGFR: 77 ML/MIN/1.73M2
EOSINOPHIL # BLD AUTO: 0.2 K/UL
EOSINOPHIL NFR BLD AUTO: 2.1 %
GLUCOSE SERPL-MCNC: 303 MG/DL
HCT VFR BLD CALC: 37.9 %
HGB BLD-MCNC: 11 G/DL
IMM GRANULOCYTES NFR BLD AUTO: 0.3 %
INR PPP: 1.19 RATIO
LYMPHOCYTES # BLD AUTO: 1.13 K/UL
LYMPHOCYTES NFR BLD AUTO: 12.1 %
MAN DIFF?: NORMAL
MCHC RBC-ENTMCNC: 22.3 PG
MCHC RBC-ENTMCNC: 29 GM/DL
MCV RBC AUTO: 76.9 FL
MONOCYTES # BLD AUTO: 0.88 K/UL
MONOCYTES NFR BLD AUTO: 9.4 %
NEUTROPHILS # BLD AUTO: 7 K/UL
NEUTROPHILS NFR BLD AUTO: 75 %
PLATELET # BLD AUTO: 305 K/UL
POTASSIUM SERPL-SCNC: 4.7 MMOL/L
PROT SERPL-MCNC: 7 G/DL
PT BLD: 13.9 SEC
RBC # BLD: 4.93 M/UL
RBC # FLD: 20.1 %
SARS-COV-2 N GENE NPH QL NAA+PROBE: NOT DETECTED
SODIUM SERPL-SCNC: 137 MMOL/L
WBC # FLD AUTO: 9.34 K/UL

## 2022-06-15 ENCOUNTER — NON-APPOINTMENT (OUTPATIENT)
Age: 67
End: 2022-06-15

## 2022-06-15 ENCOUNTER — INPATIENT (INPATIENT)
Facility: HOSPITAL | Age: 67
LOS: 0 days | Discharge: HOME | DRG: 446 | End: 2022-06-16
Attending: SURGERY | Admitting: SURGERY
Payer: COMMERCIAL

## 2022-06-15 VITALS
TEMPERATURE: 98 F | RESPIRATION RATE: 18 BRPM | HEART RATE: 86 BPM | DIASTOLIC BLOOD PRESSURE: 71 MMHG | HEIGHT: 73 IN | WEIGHT: 169.98 LBS | SYSTOLIC BLOOD PRESSURE: 116 MMHG | OXYGEN SATURATION: 100 %

## 2022-06-15 DIAGNOSIS — Z96.641 PRESENCE OF RIGHT ARTIFICIAL HIP JOINT: Chronic | ICD-10-CM

## 2022-06-15 DIAGNOSIS — Z98.890 OTHER SPECIFIED POSTPROCEDURAL STATES: Chronic | ICD-10-CM

## 2022-06-15 DIAGNOSIS — Z95.1 PRESENCE OF AORTOCORONARY BYPASS GRAFT: Chronic | ICD-10-CM

## 2022-06-15 DIAGNOSIS — Z43.4 ENCOUNTER FOR ATTENTION TO OTHER ARTIFICIAL OPENINGS OF DIGESTIVE TRACT: Chronic | ICD-10-CM

## 2022-06-15 DIAGNOSIS — Z95.5 PRESENCE OF CORONARY ANGIOPLASTY IMPLANT AND GRAFT: Chronic | ICD-10-CM

## 2022-06-15 DIAGNOSIS — Z96.651 PRESENCE OF RIGHT ARTIFICIAL KNEE JOINT: Chronic | ICD-10-CM

## 2022-06-15 DIAGNOSIS — Z41.9 ENCOUNTER FOR PROCEDURE FOR PURPOSES OTHER THAN REMEDYING HEALTH STATE, UNSPECIFIED: Chronic | ICD-10-CM

## 2022-06-15 DIAGNOSIS — Z90.89 ACQUIRED ABSENCE OF OTHER ORGANS: Chronic | ICD-10-CM

## 2022-06-15 DIAGNOSIS — Z95.810 PRESENCE OF AUTOMATIC (IMPLANTABLE) CARDIAC DEFIBRILLATOR: Chronic | ICD-10-CM

## 2022-06-15 LAB
A1C WITH ESTIMATED AVERAGE GLUCOSE RESULT: 12.1 % — HIGH (ref 4–5.6)
ALBUMIN SERPL ELPH-MCNC: 3.7 G/DL — SIGNIFICANT CHANGE UP (ref 3.3–5)
ALP SERPL-CCNC: 132 U/L — HIGH (ref 40–120)
ALT FLD-CCNC: 26 U/L — SIGNIFICANT CHANGE UP (ref 10–45)
ANION GAP SERPL CALC-SCNC: 10 MMOL/L — SIGNIFICANT CHANGE UP (ref 5–17)
APTT BLD: 26.7 SEC — LOW (ref 27.5–35.5)
AST SERPL-CCNC: 36 U/L — SIGNIFICANT CHANGE UP (ref 10–40)
BASOPHILS # BLD AUTO: 0.09 K/UL — SIGNIFICANT CHANGE UP (ref 0–0.2)
BASOPHILS NFR BLD AUTO: 0.8 % — SIGNIFICANT CHANGE UP (ref 0–2)
BILIRUB SERPL-MCNC: 0.7 MG/DL — SIGNIFICANT CHANGE UP (ref 0.2–1.2)
BLD GP AB SCN SERPL QL: NEGATIVE — SIGNIFICANT CHANGE UP
BUN SERPL-MCNC: 33 MG/DL — HIGH (ref 7–23)
CALCIUM SERPL-MCNC: 9.1 MG/DL — SIGNIFICANT CHANGE UP (ref 8.4–10.5)
CHLORIDE SERPL-SCNC: 103 MMOL/L — SIGNIFICANT CHANGE UP (ref 96–108)
CO2 SERPL-SCNC: 25 MMOL/L — SIGNIFICANT CHANGE UP (ref 22–31)
CREAT SERPL-MCNC: 1.04 MG/DL — SIGNIFICANT CHANGE UP (ref 0.5–1.3)
EGFR: 80 ML/MIN/1.73M2 — SIGNIFICANT CHANGE UP
EOSINOPHIL # BLD AUTO: 0.18 K/UL — SIGNIFICANT CHANGE UP (ref 0–0.5)
EOSINOPHIL NFR BLD AUTO: 1.7 % — SIGNIFICANT CHANGE UP (ref 0–6)
ESTIMATED AVERAGE GLUCOSE: 301 MG/DL — HIGH (ref 68–114)
GLUCOSE BLDC GLUCOMTR-MCNC: 232 MG/DL — HIGH (ref 70–99)
GLUCOSE SERPL-MCNC: 185 MG/DL — HIGH (ref 70–99)
HCT VFR BLD CALC: 37.5 % — LOW (ref 39–50)
HGB BLD-MCNC: 11 G/DL — LOW (ref 13–17)
IMM GRANULOCYTES NFR BLD AUTO: 0.2 % — SIGNIFICANT CHANGE UP (ref 0–1.5)
INR BLD: 1.26 — HIGH (ref 0.88–1.16)
LACTATE SERPL-SCNC: 1.2 MMOL/L — SIGNIFICANT CHANGE UP (ref 0.5–2)
LYMPHOCYTES # BLD AUTO: 0.89 K/UL — LOW (ref 1–3.3)
LYMPHOCYTES # BLD AUTO: 8.3 % — LOW (ref 13–44)
MCHC RBC-ENTMCNC: 22.5 PG — LOW (ref 27–34)
MCHC RBC-ENTMCNC: 29.3 GM/DL — LOW (ref 32–36)
MCV RBC AUTO: 76.7 FL — LOW (ref 80–100)
MONOCYTES # BLD AUTO: 0.81 K/UL — SIGNIFICANT CHANGE UP (ref 0–0.9)
MONOCYTES NFR BLD AUTO: 7.6 % — SIGNIFICANT CHANGE UP (ref 2–14)
NEUTROPHILS # BLD AUTO: 8.69 K/UL — HIGH (ref 1.8–7.4)
NEUTROPHILS NFR BLD AUTO: 81.4 % — HIGH (ref 43–77)
NRBC # BLD: 0 /100 WBCS — SIGNIFICANT CHANGE UP (ref 0–0)
PLATELET # BLD AUTO: 333 K/UL — SIGNIFICANT CHANGE UP (ref 150–400)
POTASSIUM SERPL-MCNC: 5 MMOL/L — SIGNIFICANT CHANGE UP (ref 3.5–5.3)
POTASSIUM SERPL-SCNC: 5 MMOL/L — SIGNIFICANT CHANGE UP (ref 3.5–5.3)
PROT SERPL-MCNC: 7.3 G/DL — SIGNIFICANT CHANGE UP (ref 6–8.3)
PROTHROM AB SERPL-ACNC: 15 SEC — HIGH (ref 10.5–13.4)
RBC # BLD: 4.89 M/UL — SIGNIFICANT CHANGE UP (ref 4.2–5.8)
RBC # FLD: 19.6 % — HIGH (ref 10.3–14.5)
RH IG SCN BLD-IMP: POSITIVE — SIGNIFICANT CHANGE UP
SARS-COV-2 RNA SPEC QL NAA+PROBE: NEGATIVE — SIGNIFICANT CHANGE UP
SODIUM SERPL-SCNC: 138 MMOL/L — SIGNIFICANT CHANGE UP (ref 135–145)
WBC # BLD: 10.68 K/UL — HIGH (ref 3.8–10.5)
WBC # FLD AUTO: 10.68 K/UL — HIGH (ref 3.8–10.5)

## 2022-06-15 PROCEDURE — 99285 EMERGENCY DEPT VISIT HI MDM: CPT

## 2022-06-15 PROCEDURE — 71045 X-RAY EXAM CHEST 1 VIEW: CPT | Mod: 26

## 2022-06-15 PROCEDURE — 99223 1ST HOSP IP/OBS HIGH 75: CPT

## 2022-06-15 RX ORDER — DEXTROSE 50 % IN WATER 50 %
15 SYRINGE (ML) INTRAVENOUS ONCE
Refills: 0 | Status: DISCONTINUED | OUTPATIENT
Start: 2022-06-15 | End: 2022-06-16

## 2022-06-15 RX ORDER — INSULIN GLARGINE 100 [IU]/ML
14 INJECTION, SOLUTION SUBCUTANEOUS AT BEDTIME
Refills: 0 | Status: DISCONTINUED | OUTPATIENT
Start: 2022-06-15 | End: 2022-06-16

## 2022-06-15 RX ORDER — ACETAMINOPHEN 500 MG
650 TABLET ORAL EVERY 6 HOURS
Refills: 0 | Status: DISCONTINUED | OUTPATIENT
Start: 2022-06-15 | End: 2022-06-16

## 2022-06-15 RX ORDER — INSULIN LISPRO 100/ML
VIAL (ML) SUBCUTANEOUS
Refills: 0 | Status: DISCONTINUED | OUTPATIENT
Start: 2022-06-15 | End: 2022-06-16

## 2022-06-15 RX ORDER — GLUCAGON INJECTION, SOLUTION 0.5 MG/.1ML
1 INJECTION, SOLUTION SUBCUTANEOUS ONCE
Refills: 0 | Status: DISCONTINUED | OUTPATIENT
Start: 2022-06-15 | End: 2022-06-16

## 2022-06-15 RX ORDER — PANTOPRAZOLE SODIUM 20 MG/1
40 TABLET, DELAYED RELEASE ORAL
Refills: 0 | Status: DISCONTINUED | OUTPATIENT
Start: 2022-06-15 | End: 2022-06-16

## 2022-06-15 RX ORDER — LEVOTHYROXINE SODIUM 125 MCG
25 TABLET ORAL DAILY
Refills: 0 | Status: DISCONTINUED | OUTPATIENT
Start: 2022-06-15 | End: 2022-06-16

## 2022-06-15 RX ORDER — ONDANSETRON 8 MG/1
4 TABLET, FILM COATED ORAL EVERY 6 HOURS
Refills: 0 | Status: DISCONTINUED | OUTPATIENT
Start: 2022-06-15 | End: 2022-06-16

## 2022-06-15 RX ORDER — DEXTROSE 50 % IN WATER 50 %
25 SYRINGE (ML) INTRAVENOUS ONCE
Refills: 0 | Status: DISCONTINUED | OUTPATIENT
Start: 2022-06-15 | End: 2022-06-16

## 2022-06-15 RX ORDER — ATORVASTATIN CALCIUM 80 MG/1
80 TABLET, FILM COATED ORAL AT BEDTIME
Refills: 0 | Status: DISCONTINUED | OUTPATIENT
Start: 2022-06-15 | End: 2022-06-16

## 2022-06-15 RX ORDER — DEXTROSE 50 % IN WATER 50 %
12.5 SYRINGE (ML) INTRAVENOUS ONCE
Refills: 0 | Status: DISCONTINUED | OUTPATIENT
Start: 2022-06-15 | End: 2022-06-16

## 2022-06-15 RX ORDER — METOPROLOL TARTRATE 50 MG
2.5 TABLET ORAL EVERY 6 HOURS
Refills: 0 | Status: DISCONTINUED | OUTPATIENT
Start: 2022-06-15 | End: 2022-06-16

## 2022-06-15 RX ORDER — DIGOXIN 250 MCG
125 TABLET ORAL EVERY 24 HOURS
Refills: 0 | Status: DISCONTINUED | OUTPATIENT
Start: 2022-06-16 | End: 2022-06-16

## 2022-06-15 RX ORDER — HEPARIN SODIUM 5000 [USP'U]/ML
5000 INJECTION INTRAVENOUS; SUBCUTANEOUS EVERY 8 HOURS
Refills: 0 | Status: DISCONTINUED | OUTPATIENT
Start: 2022-06-15 | End: 2022-06-16

## 2022-06-15 RX ORDER — SODIUM CHLORIDE 9 MG/ML
1000 INJECTION, SOLUTION INTRAVENOUS
Refills: 0 | Status: DISCONTINUED | OUTPATIENT
Start: 2022-06-15 | End: 2022-06-16

## 2022-06-15 RX ORDER — SODIUM CHLORIDE 9 MG/ML
1000 INJECTION, SOLUTION INTRAVENOUS
Refills: 0 | Status: DISCONTINUED | OUTPATIENT
Start: 2022-06-15 | End: 2022-06-15

## 2022-06-15 RX ORDER — VANCOMYCIN HCL 1 G
1000 VIAL (EA) INTRAVENOUS ONCE
Refills: 0 | Status: COMPLETED | OUTPATIENT
Start: 2022-06-15 | End: 2022-06-15

## 2022-06-15 RX ORDER — PIPERACILLIN AND TAZOBACTAM 4; .5 G/20ML; G/20ML
3.38 INJECTION, POWDER, LYOPHILIZED, FOR SOLUTION INTRAVENOUS ONCE
Refills: 0 | Status: COMPLETED | OUTPATIENT
Start: 2022-06-15 | End: 2022-06-15

## 2022-06-15 RX ADMIN — HEPARIN SODIUM 5000 UNIT(S): 5000 INJECTION INTRAVENOUS; SUBCUTANEOUS at 22:10

## 2022-06-15 RX ADMIN — Medication 2.5 MILLIGRAM(S): at 21:19

## 2022-06-15 RX ADMIN — ATORVASTATIN CALCIUM 80 MILLIGRAM(S): 80 TABLET, FILM COATED ORAL at 22:10

## 2022-06-15 RX ADMIN — PIPERACILLIN AND TAZOBACTAM 200 GRAM(S): 4; .5 INJECTION, POWDER, LYOPHILIZED, FOR SOLUTION INTRAVENOUS at 19:17

## 2022-06-15 RX ADMIN — Medication 250 MILLIGRAM(S): at 19:24

## 2022-06-15 RX ADMIN — Medication 4: at 22:12

## 2022-06-15 RX ADMIN — INSULIN GLARGINE 14 UNIT(S): 100 INJECTION, SOLUTION SUBCUTANEOUS at 22:14

## 2022-06-15 NOTE — ED PROVIDER NOTE - PHYSICAL EXAMINATION
GENERAL: non-toxic appearing, in NAD  HEAD: atraumatic, normocephalic  EYES: vision grossly intact, no conjunctivitis or discharge  EARS: hearing grossly intact  NOSE: no nasal discharge, epistaxis   CARDIAC: RRR, normal S1S2,  no appreciable murmurs, no cyanosis, cap refill < 2 seconds  PULM: no respiratory distress, oxygen saturation on RA wnl, CTAB, no crackles, rales, rhonchi, or wheezing  GI: RUQ indentation, no erythema, pus or swelling, abdomen nondistended, soft, nontender, no guarding or rebound tenderness, no palpable masses  NEURO: awake and alert, follows commands, normal speech, PERRLA, EOMI, no focal motor or sensory deficits  MSK: right AKA  EXT: no peripheral edema, calf tenderness, redness or swelling  SKIN: warm, dry, and intact, no rashes  PSYCH: appropriate mood and affect

## 2022-06-15 NOTE — ED ADULT NURSE NOTE - OBJECTIVE STATEMENT
Pt presenting for admission for surgery. States he has been having persistent infections and needs cholecystectomy. Denies fevers/chills/ pain. No n/v/d.

## 2022-06-15 NOTE — H&P ADULT - NSHPPHYSICALEXAM_GEN_ALL_CORE
GENERAL: NAD, Resting comfortably in bed  HEENT: NCAT, MMM, Normal conjunctiva, PERRL  RESP: Nonlabored breathing, No respiratory distress  CARD: Normal rate, Normal peripheral perfusion  GI: Soft, ND, NT, No guarding, No rebound tenderness; RUQ fistula with minimal yellow pus drainage over dressing; no surrounding erethyma, fluctance, tenderness, or skin break down; prior surgical scars   EXTREM: WWP, No edema, No gross deformity of extremities  NEURO: AAOx3, No focal motor or sensory deficits

## 2022-06-15 NOTE — ED ADULT TRIAGE NOTE - OTHER COMPLAINTS
patient reports "I have gall bladder stents that keep getting infected and my A1C has increased and I'm suppose to get surgery for the stents" patient reports "I have gall bladder stents that keep getting infected and my A1C has increased and I'm suppose to get surgery for the stents"-- denies fevers/chills

## 2022-06-15 NOTE — ED PROVIDER NOTE - OBJECTIVE STATEMENT
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 Castro (explant and abx spacer exchange), transferred here on 1/6 from [facility] for MRSA bacteremia due to recurrent PJI. On 1/6 He was taken to OR with orthopedic/plastic surgery for I&D, antibiotic cement spacer removal, replacement and revision gastroc flap. He was then subsequently transferred to the vascular surgery service. On 1/10, he underwent right guillotine AKA which he tolerated well. On 1/14 he was taken to OR for completion/closure for AKA, but the procedure was aborted as he became hypotensive on induction and required pressors. He was sent to the SICU intubated. He was later weaned off pressors, extubated and stepped down from SICU. On 1/18, went to OR again for closure of R AKA. he tolerated the procedure well and was transferred to the PACU in stable condition.  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 Margarito King for cholecystectomy. Patient denies fever, chills, nausea, vomiting. Eating a sandwich.

## 2022-06-15 NOTE — H&P ADULT - HISTORY OF PRESENT ILLNESS
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 King in 04/08/2021; continued to have persistent output from fistula tract and now presents to the ED for  patient presents for schedule cholecystectomy with Dr. King.     Doing well; reports his fistula continues to drain several times a day; was mostly serous but over the last couple of weeks drains yellow pus; he changes dressing multiple times throughout the day; He denies any fevers, chills, abd pain, n/v/d/c; no SOB or chest pain. He also reports his sugars have not been well controlled with most recent A1c of 12; he states he had trouble getting his Glucose monitoring device approved by insurance but has since straighten out  that out and his sugars have been normal. He has insulin pump with 0.6 rate, and follows with endocrinologist. no other concerns or complaints.    In the ED, Patient was Afebrile, VSS: Labs with WBC 10.68, Hg 11, Lactate 1.2, Normal electrolytes, glucose of 185; TB, AST, Alt, normal; Alkp 132 . Most recent MRI on 06/07/22 showed Active cholecystostomy sinus tract.                  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 Castro (explant and abx spacer exchange), and most recently transferred to 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 King in 04/08/2021; continued to have persistent output from fistula tract and now presents to the ED for  patient presents for schedule cholecystectomy with Dr. King.     Doing well; reports his fistula continues to drain several times a day; was mostly serous but over the last couple of weeks drains yellow pus; he changes dressing multiple times throughout the day; He denies any fevers, chills, abd pain, n/v/d/c; no SOB or chest pain. He also reports his sugars have not been well controlled with most recent A1c of 12; he states he had trouble getting his Glucose monitoring device approved by insurance but has since straighten out  that out and his sugars have been normal. He has insulin pump with 0.6 rate, and follows with endocrinologist. no other concerns or complaints.    In the ED, Patient was Afebrile, VSS: Labs with WBC 10.68, Hg 11, Lactate 1.2, Normal electrolytes, glucose of 185; TB, AST, Alt, normal; Alkp 132 . Most recent MRI on 06/07/22 showed Active cholecystostomy sinus tract.

## 2022-06-15 NOTE — ED ADULT TRIAGE NOTE - NS ED TRIAGE AVPU SCALE
no fever and no chills.
Alert-The patient is alert, awake and responds to voice. The patient is oriented to time, place, and person. The triage nurse is able to obtain subjective information.

## 2022-06-15 NOTE — ED PROVIDER NOTE - NSICDXPASTMEDICALHX_GEN_ALL_CORE_FT
PAST MEDICAL HISTORY:  Anxiety and depression     Atherosclerosis of coronary artery CAD (coronary artery disease)    Blood clot due to device, implant, or graft was on blood thinners    CHF (congestive heart failure) EF ~ 25%    Diabetes on insulin pump    Diabetic neuropathy     Diverticulitis     History of celiac disease     HLD (hyperlipidemia)     HTN (hypertension)     Osteoarthritis     Preoperative clearance     Status post percutaneous transluminal coronary angioplasty 2 stents    STEMI (ST elevation myocardial infarction)

## 2022-06-15 NOTE — ED PROVIDER NOTE - CLINICAL SUMMARY MEDICAL DECISION MAKING FREE TEXT BOX
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 Margarito King for cholecystectomy. On exam, VS wnl, afebrile, non-toxic appearing, R AKA, RUQ indentation, no erythema, pus or swelling, abdomen nondistended, soft, nontender, no guarding or rebound tenderness, no palpable masses. Will check pre-op labs, admit to surgery, antibiotics.

## 2022-06-15 NOTE — ED ADULT NURSE NOTE - INTERVENTIONS DEFINITIONS
[place] to call system/Instruct patient to call for assistance/Physically safe environment: no spills, clutter or unnecessary equipment/Stretcher in lowest position, wheels locked, appropriate side rails in place

## 2022-06-15 NOTE — H&P ADULT - ASSESSMENT
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 King in 04/08/2021; continued to have persistent output from fistula tract and now  for schedule cholecystectomy with Dr. King and blood sugar optimization.    plan:  Admit to regional   DM diet/NPO midnight  OOB/IS/  IVF while npo   insulin pump stopped; will give Lantus at 10pm, ISS; endo to follow    continue Beta blocker  SCD/ SQH

## 2022-06-15 NOTE — ED ADULT NURSE NOTE - OTHER COMPLAINTS
patient reports "I have gall bladder stents that keep getting infected and my A1C has increased and I'm suppose to get surgery for the stents"-- denies fevers/chills

## 2022-06-16 ENCOUNTER — APPOINTMENT (OUTPATIENT)
Dept: UROLOGY | Facility: HOSPITAL | Age: 67
End: 2022-06-16

## 2022-06-16 ENCOUNTER — APPOINTMENT (OUTPATIENT)
Dept: SURGICAL ONCOLOGY | Facility: HOSPITAL | Age: 67
End: 2022-06-16

## 2022-06-16 ENCOUNTER — TRANSCRIPTION ENCOUNTER (OUTPATIENT)
Age: 67
End: 2022-06-16

## 2022-06-16 VITALS — TEMPERATURE: 97 F

## 2022-06-16 LAB
A1C WITH ESTIMATED AVERAGE GLUCOSE RESULT: 12 % — HIGH (ref 4–5.6)
ALBUMIN SERPL ELPH-MCNC: 3.5 G/DL — SIGNIFICANT CHANGE UP (ref 3.3–5)
ALP SERPL-CCNC: 123 U/L — HIGH (ref 40–120)
ALT FLD-CCNC: 22 U/L — SIGNIFICANT CHANGE UP (ref 10–45)
ANION GAP SERPL CALC-SCNC: 10 MMOL/L — SIGNIFICANT CHANGE UP (ref 5–17)
AST SERPL-CCNC: 22 U/L — SIGNIFICANT CHANGE UP (ref 10–40)
BASOPHILS # BLD AUTO: 0.08 K/UL — SIGNIFICANT CHANGE UP (ref 0–0.2)
BASOPHILS NFR BLD AUTO: 0.9 % — SIGNIFICANT CHANGE UP (ref 0–2)
BILIRUB SERPL-MCNC: 0.8 MG/DL — SIGNIFICANT CHANGE UP (ref 0.2–1.2)
BUN SERPL-MCNC: 27 MG/DL — HIGH (ref 7–23)
CALCIUM SERPL-MCNC: 8.8 MG/DL — SIGNIFICANT CHANGE UP (ref 8.4–10.5)
CHLORIDE SERPL-SCNC: 104 MMOL/L — SIGNIFICANT CHANGE UP (ref 96–108)
CO2 SERPL-SCNC: 23 MMOL/L — SIGNIFICANT CHANGE UP (ref 22–31)
CREAT SERPL-MCNC: 0.97 MG/DL — SIGNIFICANT CHANGE UP (ref 0.5–1.3)
EGFR: 87 ML/MIN/1.73M2 — SIGNIFICANT CHANGE UP
EOSINOPHIL # BLD AUTO: 0.24 K/UL — SIGNIFICANT CHANGE UP (ref 0–0.5)
EOSINOPHIL NFR BLD AUTO: 2.6 % — SIGNIFICANT CHANGE UP (ref 0–6)
ESTIMATED AVERAGE GLUCOSE: 298 MG/DL — HIGH (ref 68–114)
GLUCOSE BLDC GLUCOMTR-MCNC: 124 MG/DL — HIGH (ref 70–99)
GLUCOSE BLDC GLUCOMTR-MCNC: 181 MG/DL — HIGH (ref 70–99)
GLUCOSE BLDC GLUCOMTR-MCNC: 257 MG/DL — HIGH (ref 70–99)
GLUCOSE SERPL-MCNC: 106 MG/DL — HIGH (ref 70–99)
HCT VFR BLD CALC: 36.8 % — LOW (ref 39–50)
HGB BLD-MCNC: 10.9 G/DL — LOW (ref 13–17)
IMM GRANULOCYTES NFR BLD AUTO: 0.2 % — SIGNIFICANT CHANGE UP (ref 0–1.5)
LYMPHOCYTES # BLD AUTO: 0.99 K/UL — LOW (ref 1–3.3)
LYMPHOCYTES # BLD AUTO: 10.9 % — LOW (ref 13–44)
MAGNESIUM SERPL-MCNC: 2 MG/DL — SIGNIFICANT CHANGE UP (ref 1.6–2.6)
MCHC RBC-ENTMCNC: 22.1 PG — LOW (ref 27–34)
MCHC RBC-ENTMCNC: 29.6 GM/DL — LOW (ref 32–36)
MCV RBC AUTO: 74.5 FL — LOW (ref 80–100)
MONOCYTES # BLD AUTO: 0.77 K/UL — SIGNIFICANT CHANGE UP (ref 0–0.9)
MONOCYTES NFR BLD AUTO: 8.5 % — SIGNIFICANT CHANGE UP (ref 2–14)
NEUTROPHILS # BLD AUTO: 6.98 K/UL — SIGNIFICANT CHANGE UP (ref 1.8–7.4)
NEUTROPHILS NFR BLD AUTO: 76.9 % — SIGNIFICANT CHANGE UP (ref 43–77)
NRBC # BLD: 0 /100 WBCS — SIGNIFICANT CHANGE UP (ref 0–0)
PHOSPHATE SERPL-MCNC: 3.9 MG/DL — SIGNIFICANT CHANGE UP (ref 2.5–4.5)
PLATELET # BLD AUTO: 307 K/UL — SIGNIFICANT CHANGE UP (ref 150–400)
POTASSIUM SERPL-MCNC: 4.2 MMOL/L — SIGNIFICANT CHANGE UP (ref 3.5–5.3)
POTASSIUM SERPL-SCNC: 4.2 MMOL/L — SIGNIFICANT CHANGE UP (ref 3.5–5.3)
PROT SERPL-MCNC: 6.7 G/DL — SIGNIFICANT CHANGE UP (ref 6–8.3)
RBC # BLD: 4.94 M/UL — SIGNIFICANT CHANGE UP (ref 4.2–5.8)
RBC # FLD: 19.9 % — HIGH (ref 10.3–14.5)
SODIUM SERPL-SCNC: 137 MMOL/L — SIGNIFICANT CHANGE UP (ref 135–145)
WBC # BLD: 9.08 K/UL — SIGNIFICANT CHANGE UP (ref 3.8–10.5)
WBC # FLD AUTO: 9.08 K/UL — SIGNIFICANT CHANGE UP (ref 3.8–10.5)

## 2022-06-16 PROCEDURE — 96375 TX/PRO/DX INJ NEW DRUG ADDON: CPT

## 2022-06-16 PROCEDURE — 87040 BLOOD CULTURE FOR BACTERIA: CPT

## 2022-06-16 PROCEDURE — 83036 HEMOGLOBIN GLYCOSYLATED A1C: CPT

## 2022-06-16 PROCEDURE — 36415 COLL VENOUS BLD VENIPUNCTURE: CPT

## 2022-06-16 PROCEDURE — 83735 ASSAY OF MAGNESIUM: CPT

## 2022-06-16 PROCEDURE — 87635 SARS-COV-2 COVID-19 AMP PRB: CPT

## 2022-06-16 PROCEDURE — 96374 THER/PROPH/DIAG INJ IV PUSH: CPT

## 2022-06-16 PROCEDURE — 99238 HOSP IP/OBS DSCHRG MGMT 30/<: CPT

## 2022-06-16 PROCEDURE — 80053 COMPREHEN METABOLIC PANEL: CPT

## 2022-06-16 PROCEDURE — 86900 BLOOD TYPING SEROLOGIC ABO: CPT

## 2022-06-16 PROCEDURE — 85025 COMPLETE CBC W/AUTO DIFF WBC: CPT

## 2022-06-16 PROCEDURE — 84100 ASSAY OF PHOSPHORUS: CPT

## 2022-06-16 PROCEDURE — 86901 BLOOD TYPING SEROLOGIC RH(D): CPT

## 2022-06-16 PROCEDURE — 99222 1ST HOSP IP/OBS MODERATE 55: CPT | Mod: GC

## 2022-06-16 PROCEDURE — 83605 ASSAY OF LACTIC ACID: CPT

## 2022-06-16 PROCEDURE — 99285 EMERGENCY DEPT VISIT HI MDM: CPT | Mod: 25

## 2022-06-16 PROCEDURE — 86850 RBC ANTIBODY SCREEN: CPT

## 2022-06-16 PROCEDURE — 85730 THROMBOPLASTIN TIME PARTIAL: CPT

## 2022-06-16 PROCEDURE — 82962 GLUCOSE BLOOD TEST: CPT

## 2022-06-16 PROCEDURE — 71045 X-RAY EXAM CHEST 1 VIEW: CPT

## 2022-06-16 PROCEDURE — 85610 PROTHROMBIN TIME: CPT

## 2022-06-16 RX ORDER — INSULIN LISPRO 100/ML
VIAL (ML) SUBCUTANEOUS
Refills: 0 | Status: DISCONTINUED | OUTPATIENT
Start: 2022-06-16 | End: 2022-06-16

## 2022-06-16 RX ORDER — ACETAMINOPHEN 500 MG
2 TABLET ORAL
Qty: 0 | Refills: 0 | DISCHARGE
Start: 2022-06-16

## 2022-06-16 RX ORDER — INSULIN LISPRO 100/ML
6 VIAL (ML) SUBCUTANEOUS ONCE
Refills: 0 | Status: COMPLETED | OUTPATIENT
Start: 2022-06-16 | End: 2022-06-16

## 2022-06-16 RX ADMIN — Medication 2.5 MILLIGRAM(S): at 05:29

## 2022-06-16 RX ADMIN — Medication 3: at 16:41

## 2022-06-16 RX ADMIN — PANTOPRAZOLE SODIUM 40 MILLIGRAM(S): 20 TABLET, DELAYED RELEASE ORAL at 05:30

## 2022-06-16 RX ADMIN — Medication 2.5 MILLIGRAM(S): at 03:10

## 2022-06-16 RX ADMIN — Medication 25 MICROGRAM(S): at 05:29

## 2022-06-16 RX ADMIN — Medication 125 MICROGRAM(S): at 07:09

## 2022-06-16 RX ADMIN — HEPARIN SODIUM 5000 UNIT(S): 5000 INJECTION INTRAVENOUS; SUBCUTANEOUS at 14:41

## 2022-06-16 RX ADMIN — Medication 2.5 MILLIGRAM(S): at 12:41

## 2022-06-16 RX ADMIN — Medication 2.5 MILLIGRAM(S): at 17:37

## 2022-06-16 RX ADMIN — Medication 6 UNIT(S): at 16:40

## 2022-06-16 RX ADMIN — HEPARIN SODIUM 5000 UNIT(S): 5000 INJECTION INTRAVENOUS; SUBCUTANEOUS at 05:29

## 2022-06-16 NOTE — PROGRESS NOTE ADULT - SUBJECTIVE AND OBJECTIVE BOX
INTERVAL HPI/OVERNIGHT EVENTS: admitted for pre op optimization, 14U lantus given, added on to OR     SUBJECTIVE: Pt seen and examined at bedside this am by surgery team. No acute complaints. Per endo, switched to low dose SS today. Added on for lap sudhir today. Denies f/n/v/cp/sob.    MEDICATIONS  (STANDING):  atorvastatin 80 milliGRAM(s) Oral at bedtime  dextrose 5%. 1000 milliLiter(s) (50 mL/Hr) IV Continuous <Continuous>  dextrose 5%. 1000 milliLiter(s) (100 mL/Hr) IV Continuous <Continuous>  dextrose 50% Injectable 25 Gram(s) IV Push once  dextrose 50% Injectable 12.5 Gram(s) IV Push once  dextrose 50% Injectable 25 Gram(s) IV Push once  digoxin     Tablet 125 MICROGram(s) Oral every 24 hours  glucagon  Injectable 1 milliGRAM(s) IntraMuscular once  heparin   Injectable 5000 Unit(s) SubCutaneous every 8 hours  insulin glargine Injectable (LANTUS) 14 Unit(s) SubCutaneous at bedtime  insulin lispro (ADMELOG) corrective regimen sliding scale   SubCutaneous Before meals and at bedtime  levothyroxine 25 MICROGram(s) Oral daily  metoprolol tartrate Injectable 2.5 milliGRAM(s) IV Push every 6 hours  pantoprazole    Tablet 40 milliGRAM(s) Oral before breakfast    MEDICATIONS  (PRN):  acetaminophen     Tablet .. 650 milliGRAM(s) Oral every 6 hours PRN Mild Pain (1 - 3)  dextrose Oral Gel 15 Gram(s) Oral once PRN Blood Glucose LESS THAN 70 milliGRAM(s)/deciliter  ondansetron Injectable 4 milliGRAM(s) IV Push every 6 hours PRN Nausea    Vital Signs Last 24 Hrs  T(C): 36.7 (16 Jun 2022 05:04), Max: 36.7 (16 Jun 2022 05:04)  T(F): 98.1 (16 Jun 2022 05:04), Max: 98.1 (16 Jun 2022 05:04)  HR: 82 (16 Jun 2022 09:40) (82 - 94)  BP: 116/- (16 Jun 2022 09:40) (116/- - 131/71)  BP(mean): 94 (16 Jun 2022 04:28) (84 - 94)  RR: 16 (16 Jun 2022 09:40) (16 - 18)  SpO2: 95% (16 Jun 2022 09:40) (95% - 100%)    PHYSICAL EXAM:    Constitutional: A&Ox3, NAD    Respiratory: non labored breathing, no respiratory distress    Cardiovascular: NSR, RRR    Gastrointestinal: abdomen soft, ND, NT, No guarding, No rebound tenderness; RUQ fistula with minimal yellow pus drainage over dressing; no surrounding erythema, fluctuance, tenderness, or skin break down; prior surgical scars     Extremities: wwp, no calf tenderness or edema. SCDs in place     I&O's Detail    15 Nael 2022 07:01  -  16 Jun 2022 07:00  --------------------------------------------------------  IN:  Total IN: 0 mL    OUT:    Voided (mL): 300 mL  Total OUT: 300 mL    Total NET: -300 mL          LABS:                        10.9   9.08  )-----------( 307      ( 16 Jun 2022 05:39 )             36.8     06-16    137  |  104  |  27<H>  ----------------------------<  106<H>  4.2   |  23  |  0.97    Ca    8.8      16 Jun 2022 05:39  Phos  3.9     06-16  Mg     2.0     06-16    TPro  6.7  /  Alb  3.5  /  TBili  0.8  /  DBili  x   /  AST  22  /  ALT  22  /  AlkPhos  123<H>  06-16    PT/INR - ( 15 Nael 2022 18:50 )   PT: 15.0 sec;   INR: 1.26          PTT - ( 15 Nael 2022 18:50 )  PTT:26.7 sec      RADIOLOGY & ADDITIONAL STUDIES:

## 2022-06-16 NOTE — DISCHARGE NOTE PROVIDER - HOSPITAL COURSE
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 Castro (explant and abx spacer exchange), and most recently transferred to 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 King in 04/08/2021; continued to have persistent output from fistula tract and now presents to the ED for  patient presented on day of admission for schedule cholecystectomy with Dr. King.  Was doing well; reported his fistula continues to drain several times a day; was mostly serous but over the previous couple of weeks drained yellow pus; he changed dressing multiple times throughout the day; He denied any fevers, chills, abd pain, n/v/d/c; no SOB or chest pain. He also reported his sugars have not been well controlled with most recent A1c of 12; he stated he had trouble getting his Glucose monitoring device approved by insurance but has since straighten out  that out and his sugars have been normal. He has insulin pump with 0.6 rate, and follows with endocrinologist. no other concerns or complaints. He was admitted for preoperative optimization and endocrine consult. Overnight patient was given 14 units of lantus. On hospital day one patient was switched to a low dose sliding scale per endocrinology, however his surgery was cancelled due to patient taking home Plavix two days prior. Patient was discharged and instructed to follow up with Dr. King outpatient to reschedule cholecystectomy.

## 2022-06-16 NOTE — DISCHARGE NOTE PROVIDER - NSDCCPCAREPLAN_GEN_ALL_CORE_FT
PRINCIPAL DISCHARGE DIAGNOSIS  Diagnosis: Abdominal pain  Assessment and Plan of Treatment: cholecystitis, patient to follow up with Dr. King in his office to schedule surgery

## 2022-06-16 NOTE — DISCHARGE NOTE NURSING/CASE MANAGEMENT/SOCIAL WORK - PATIENT PORTAL LINK FT
You can access the FollowMyHealth Patient Portal offered by [facility] by registering at the following website: [email or web address]. By joining [employer]’s FollowMyHealth portal, you will also be able to view your health information using other applications (apps) compatible with our system.

## 2022-06-16 NOTE — DISCHARGE NOTE PROVIDER - NSDCMRMEDTOKEN_GEN_ALL_CORE_FT
acetaminophen 325 mg oral tablet: 2 tab(s) orally every 6 hours, As needed, Mild Pain (1 - 3)  aspirin 81 mg oral delayed release tablet: 1 tab(s) orally once a day  digoxin 125 mcg (0.125 mg) oral tablet: 1 tab(s) orally once a day  DULoxetine 30 mg oral delayed release capsule: 1 cap(s) orally 2 times a day  Flomax 0.4 mg oral capsule: 1 cap(s) orally once a day  levothyroxine 25 mcg (0.025 mg) oral tablet: 1 tab(s) orally once a day  metoprolol succinate 25 mg oral tablet, extended release: 1 tab(s) orally once a day  Multiple Vitamins oral tablet: 1 tab(s) orally once a day  pantoprazole 40 mg oral delayed release tablet: 1 tab(s) orally once a day (before a meal)  prasugrel 10 mg oral tablet: 1 tab(s) orally once a day  rosuvastatin 40 mg oral tablet: 1 tab(s) orally once a day  senna oral tablet: 2 tab(s) orally once a day (at bedtime)  simethicone 80 mg oral tablet, chewable: 1 tab(s) orally once a day, As needed, Gas  spironolactone 25 mg oral tablet: 1 tab(s) orally once a day

## 2022-06-16 NOTE — DISCHARGE NOTE PROVIDER - CARE PROVIDER_API CALL
Margarito King)  Surgery  [address] [address]  Phone: [phone]  Fax: [phone]  Follow Up Time: Routine

## 2022-06-16 NOTE — DISCHARGE NOTE NURSING/CASE MANAGEMENT/SOCIAL WORK - NSDCPEFALRISK_GEN_ALL_CORE
For information on Fall & Injury Prevention, visit: [email or web address] OR  [email or web address] OR  https://www.cdc.gov/steadi/patient.html

## 2022-06-16 NOTE — PROGRESS NOTE ADULT - ASSESSMENT
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 King in 04/08/2021; continued to have persistent output from fistula tract and now for schedule cholecystectomy with Dr. King and blood sugar optimization.    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 King in 04/08/2021; continued to have persistent output from fistula tract and now for schedule cholecystectomy with Dr. King and blood sugar optimization.    DM diet/NPO midnight, no IVF given low EF   OOBA/IS  insulin pump stopped; low ISS and lantus 14 QHS; endocrine following   Lopressor   SCDs/SQH  AM labs

## 2022-06-16 NOTE — CONSULT NOTE ADULT - ATTENDING COMMENTS
Pt seen on rounds this afternoon.  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  Is now admitted for elective cholecystectomy with closure of cholecystocutaneous fistula, but surgery needed to be postponed because pt had stopped his Plavix only 2 days ago.    Uses an insulin pump as outpatient, but glycemic control is chronically poor--(current A1c level 12%).  Although the pt says that his control has only recently lapsed and was due to dietary non-compliance, he almost certainly has continued following the pattern of not bolusing for meals or snacks.  He had resumed his previous basal rate of 2 U/hr after his last discharge, and at some point (likely at the advice of his endocrinologist) decreased this back to 0.4 U/hr, which is closer to is true basal requirements.  He was unable to provide us any meaningful glucose data which would have helped us guide a meaningful adjustment of his pump settings.  He was given 14 units of Lantus last night after pump was D/C'd, and is blood sugar this morning was excellent at 124 mg%--suggesting that the basal rate of 0.6 U/hr is not far off.  He intends to go back on the pump post discharge.  The only meaningful suggestion we were able to make was that he keep the lower basal rate, tighten up on his diet, and take all of his meal boluses.

## 2022-06-16 NOTE — CONSULT NOTE ADULT - SUBJECTIVE AND OBJECTIVE BOX
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 Castro (explant and abx spacer exchange), and most recently transferred to 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 King in 04/08/2021; continued to have persistent output from fistula tract and now presents to the ED for  patient presents for schedule cholecystectomy with Dr. King.   Patient took plavix two days ago and surgery is to be postponed.   Endocrinology consulted for optimization of diabetes management.    Age at Dx:  How dx:  Hx and duration of insulin:  Current Therapy:  Hx of hypoglycemia  Hx of DKA/HHS?    Home FSG:  Fasting  Lunch  Dinner  Bed    Hx of other regimens  Complications:  Outpatient Endo:    PMH & Surgical Hx:      FH:  DM:  Thyroid:  Autoimmune:  Other:    SH:  Smoking  Etoh:  Recreational Drugs:  Social Life:    Current Meds:  acetaminophen     Tablet .. 650 milliGRAM(s) Oral every 6 hours PRN  atorvastatin 80 milliGRAM(s) Oral at bedtime  dextrose 5%. 1000 milliLiter(s) IV Continuous <Continuous>  dextrose 5%. 1000 milliLiter(s) IV Continuous <Continuous>  dextrose 50% Injectable 25 Gram(s) IV Push once  dextrose 50% Injectable 12.5 Gram(s) IV Push once  dextrose 50% Injectable 25 Gram(s) IV Push once  dextrose Oral Gel 15 Gram(s) Oral once PRN  digoxin     Tablet 125 MICROGram(s) Oral every 24 hours  glucagon  Injectable 1 milliGRAM(s) IntraMuscular once  heparin   Injectable 5000 Unit(s) SubCutaneous every 8 hours  insulin glargine Injectable (LANTUS) 14 Unit(s) SubCutaneous at bedtime  insulin lispro (ADMELOG) corrective regimen sliding scale   SubCutaneous Before meals and at bedtime  levothyroxine 25 MICROGram(s) Oral daily  metoprolol tartrate Injectable 2.5 milliGRAM(s) IV Push every 6 hours  ondansetron Injectable 4 milliGRAM(s) IV Push every 6 hours PRN  pantoprazole    Tablet 40 milliGRAM(s) Oral before breakfast      Allergies:  ACE inhibitors (Hives)  carvedilol (Other)  enalapril (Hives)  Entresto (Other)      ROS:  Denies the following except as indicated.    General: weight loss/weight gain, decreased appetite, fatigue  Eyes: Blurry vision, double vision, visual changes  ENT: Throat pain, changes in voice,   CV: palpitations, SOB, CP, cough  GI: NVD, difficulty swallowing, abdominal pain  : polyuria, dysuria  Endo: abnormal menses, temperature intolerance, decreased libido  MSK: weakness, joint pain  Skin: rash, dryness, diaphoresis  Heme: Easy bruising,bleeding  Neuro: HA, dizziness, lightheadedness, numbness tingling  Psych: Anxiety, Depression    Vital Signs Last 24 Hrs  T(C): 36.9 (16 Jun 2022 13:12), Max: 37 (16 Jun 2022 09:02)  T(F): 98.4 (16 Jun 2022 13:12), Max: 98.6 (16 Jun 2022 09:02)  HR: 84 (16 Jun 2022 13:00) (82 - 94)  BP: 104/52 (16 Jun 2022 13:00) (104/52 - 131/71)  BP(mean): 75 (16 Jun 2022 13:00) (75 - 94)  RR: 18 (16 Jun 2022 13:00) (16 - 18)  SpO2: 95% (16 Jun 2022 13:00) (95% - 100%)  Height (cm): 185.4 (06-15 @ 17:06)  Weight (kg): 77.1 (06-15 @ 17:06)  BMI (kg/m2): 22.4 (06-15 @ 17:06)      Constitutional: NAD.   HEENT: NCAT, MMM, OP clear, EOMI, , no proptosis or lid retraction  Neck: no thyromegaly or palpable thyroid nodules   Respiratory: lungs CTAB.  Cardiovascular: regular rhythm, normal S1 and S2, no audible murmurs, no peripheral edema  GI: soft, NT/ND, no masses/HSM appreciated.  Neurology: no tremors, DTR 2+  Skin: no visible rashes/lesions  Psychiatric: AAO x 3, normal affect/mood.  Ext: radial pulses intact, DP pulses intact, extremities warm, no cyanosis, clubbing or edema.       LABS:                        10.9   9.08  )-----------( 307      ( 16 Jun 2022 05:39 )             36.8     06-16    137  |  104  |  27<H>  ----------------------------<  106<H>  4.2   |  23  |  0.97    Ca    8.8      16 Jun 2022 05:39  Phos  3.9     06-16  Mg     2.0     06-16    TPro  6.7  /  Alb  3.5  /  TBili  0.8  /  DBili  x   /  AST  22  /  ALT  22  /  AlkPhos  123<H>  06-16    PT/INR - ( 15 Nael 2022 18:50 )   PT: 15.0 sec;   INR: 1.26          PTT - ( 15 Nael 2022 18:50 )  PTT:26.7 sec      Thyroid Stimulating Hormone, Serum: 2.850 (01-08 @ 08:25)      RADIOLOGY & ADDITIONAL STUDIES:  CAPILLARY BLOOD GLUCOSE      POCT Blood Glucose.: 181 mg/dL (16 Jun 2022 12:54)  POCT Blood Glucose.: 124 mg/dL (16 Jun 2022 06:52)  POCT Blood Glucose.: 232 mg/dL (15 Nael 2022 22:00)        A/P:66M w/ PMHx of CAD s/p MIDCAB/VERONICA 2018, AICD (2/2020), CHF (EF 15-20%) uncontrolled DM, HLD, HTN, R TKA several years ago c/b recurrent PJI and MRSA bacteremia and multiple revisions AKA 1/2022 and history of acute cholecystitis s/p perc sudhir 2/28/20 (drain removed in late May 2020) c/b by cholecystocutaneous excision of cholecystocutaneous fistulous tract with Dr. King in 04/08/2021; continued to have persistent output from fistula tract and now for schedule cholecystectomy with Dr. King and blood sugar optimization.  A1c:  Wt:  Cr:  GFR:  home regimen: insulin pump     1.  DM type    Pt's fingerstick glucose goal is     Will continue to monitor     For discharge, pt can continue    Pt can follow up at discharge with  Dr Schmitt   Will discuss case with     and update primary team HPI: 66M w/ PMHx of CAD s/p MIDCAB/VERONICA 2018, AICD (2/2020), CHF (EF 15-20%) uncontrolled DM, HLD, HTN, R TKA several years ago c/b recurrent PJI and multiple revisions  in 09/2020 by Dr. Castro (explant and abx spacer exchange), and most recently transferred to 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 King in 04/08/2021; continued to have persistent output from fistula tract and now presents to the ED for  patient presents for schedule cholecystectomy with Dr. King.   Patient took plavix two days ago and surgery is to be postponed.   Endocrinology consulted for optimization of diabetes management.  diagnosed 30 years ago.  Previous patient of Dr Cormier and now sees Dr Schmitt in [place]    He was on medtronic 670g prior. Settings per Dr Schmitt's notes - Basal rate 2.0      Diagnosis >30 years  Current Regimen: insulin Humalog in pump  Previous regimens: was on po meds but taken off , has been on insulin pump for 5-6 years now  Compliance: ok , NOT using bolus wizard  SMBG/CGM : has Freestyle storm 14 days  Hypoglycemia:no more  Polyuria/polydipsia : no  Weight change/BMI: yes lost  Diet: breakfast dinner , occasional lunch  Exercise: very limited due to patient recent medical problems with knee replacement and hip fracture , now in a wheelchair  HBa1c trend: not available       PMH & Surgical Hx:      FH:  DM:  Thyroid:  Autoimmune:  Other:    SH:  Smoking  Etoh:  Recreational Drugs:  Social Life:    Current Meds:  acetaminophen     Tablet .. 650 milliGRAM(s) Oral every 6 hours PRN  atorvastatin 80 milliGRAM(s) Oral at bedtime  dextrose 5%. 1000 milliLiter(s) IV Continuous <Continuous>  dextrose 5%. 1000 milliLiter(s) IV Continuous <Continuous>  dextrose 50% Injectable 25 Gram(s) IV Push once  dextrose 50% Injectable 12.5 Gram(s) IV Push once  dextrose 50% Injectable 25 Gram(s) IV Push once  dextrose Oral Gel 15 Gram(s) Oral once PRN  digoxin     Tablet 125 MICROGram(s) Oral every 24 hours  glucagon  Injectable 1 milliGRAM(s) IntraMuscular once  heparin   Injectable 5000 Unit(s) SubCutaneous every 8 hours  insulin glargine Injectable (LANTUS) 14 Unit(s) SubCutaneous at bedtime  insulin lispro (ADMELOG) corrective regimen sliding scale   SubCutaneous Before meals and at bedtime  levothyroxine 25 MICROGram(s) Oral daily  metoprolol tartrate Injectable 2.5 milliGRAM(s) IV Push every 6 hours  ondansetron Injectable 4 milliGRAM(s) IV Push every 6 hours PRN  pantoprazole    Tablet 40 milliGRAM(s) Oral before breakfast      Allergies:  ACE inhibitors (Hives)  carvedilol (Other)  enalapril (Hives)  Entresto (Other)      ROS:  Denies the following except as indicated.    General: weight loss/weight gain, decreased appetite, fatigue  Eyes: Blurry vision, double vision, visual changes  ENT: Throat pain, changes in voice,   CV: palpitations, SOB, CP, cough  GI: NVD, difficulty swallowing, abdominal pain  : polyuria, dysuria  Endo: abnormal menses, temperature intolerance, decreased libido  MSK: weakness, joint pain  Skin: rash, dryness, diaphoresis  Heme: Easy bruising,bleeding  Neuro: HA, dizziness, lightheadedness, numbness tingling  Psych: Anxiety, Depression    Vital Signs Last 24 Hrs  T(C): 36.9 (16 Jun 2022 13:12), Max: 37 (16 Jun 2022 09:02)  T(F): 98.4 (16 Jun 2022 13:12), Max: 98.6 (16 Jun 2022 09:02)  HR: 84 (16 Jun 2022 13:00) (82 - 94)  BP: 104/52 (16 Jun 2022 13:00) (104/52 - 131/71)  BP(mean): 75 (16 Jun 2022 13:00) (75 - 94)  RR: 18 (16 Jun 2022 13:00) (16 - 18)  SpO2: 95% (16 Jun 2022 13:00) (95% - 100%)  Height (cm): 185.4 (06-15 @ 17:06)  Weight (kg): 77.1 (06-15 @ 17:06)  BMI (kg/m2): 22.4 (06-15 @ 17:06)      Constitutional: NAD.   HEENT: NCAT, MMM, OP clear, EOMI, , no proptosis or lid retraction  Neck: no thyromegaly or palpable thyroid nodules   Respiratory: lungs CTAB.  Cardiovascular: regular rhythm, normal S1 and S2, no audible murmurs, no peripheral edema  GI: soft, NT/ND, no masses/HSM appreciated.  Neurology: no tremors, DTR 2+  Skin: no visible rashes/lesions  Psychiatric: AAO x 3, normal affect/mood.  Ext: radial pulses intact, DP pulses intact, extremities warm, no cyanosis, clubbing or edema.       LABS:                        10.9   9.08  )-----------( 307      ( 16 Jun 2022 05:39 )             36.8     06-16    137  |  104  |  27<H>  ----------------------------<  106<H>  4.2   |  23  |  0.97    Ca    8.8      16 Jun 2022 05:39  Phos  3.9     06-16  Mg     2.0     06-16    TPro  6.7  /  Alb  3.5  /  TBili  0.8  /  DBili  x   /  AST  22  /  ALT  22  /  AlkPhos  123<H>  06-16    PT/INR - ( 15 Nael 2022 18:50 )   PT: 15.0 sec;   INR: 1.26          PTT - ( 15 Nael 2022 18:50 )  PTT:26.7 sec      Thyroid Stimulating Hormone, Serum: 2.850 (01-08 @ 08:25)      RADIOLOGY & ADDITIONAL STUDIES:  CAPILLARY BLOOD GLUCOSE      POCT Blood Glucose.: 181 mg/dL (16 Jun 2022 12:54)  POCT Blood Glucose.: 124 mg/dL (16 Jun 2022 06:52)  POCT Blood Glucose.: 232 mg/dL (15 Nael 2022 22:00)        A/P:66M w/ PMHx of CAD s/p MIDCAB/VERONICA 2018, AICD (2/2020), CHF (EF 15-20%) uncontrolled DM, HLD, HTN, R TKA several years ago c/b recurrent PJI and MRSA bacteremia and multiple revisions AKA 1/2022 and history of acute cholecystitis s/p perc sudhir 2/28/20 (drain removed in late May 2020) c/b by cholecystocutaneous excision of cholecystocutaneous fistulous tract with Dr. King in 04/08/2021; continued to have persistent output from fistula tract and now for schedule cholecystectomy with Dr. King and blood sugar optimization.  A1c:  Wt:  Cr:  GFR:  home regimen: insulin pump     1.  DM type    Pt's fingerstick glucose goal is     Will continue to monitor     For discharge, pt can continue    Pt can follow up at discharge with  Dr Schmitt   Will discuss case with     and update primary team HPI: 66M w/ PMHx of CAD s/p MIDCAB/VERONICA 2018, AICD (2/2020), CHF (EF 15-20%) uncontrolled DM, HLD, HTN, R TKA several years ago c/b recurrent PJI and multiple revisions  in 09/2020 by Dr. Castro (explant and abx spacer exchange), and most recently transferred to 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 King in 04/08/2021; continued to have persistent output from fistula tract and now presents to the ED for  patient presents for schedule cholecystectomy with Dr. King.   Patient took plavix two days ago and surgery is to be postponed.   Endocrinology consulted for optimization of diabetes management.  diagnosed 30 years ago.  Previous patient of Dr Cormier and now sees Dr Schmitt in [place]    He was on medtronic 670g prior. Settings per Dr Schmitt's notes - Basal rate 2.0  ICR 10, ISF 40.      Diagnosis >30 years  Current Regimen: insulin Humalog in pump  Previous regimens: was on po meds but taken off , has been on insulin pump for 5-6 years now  Compliance: ok , NOT using bolus wizard  SMBG/CGM : has Freestyle storm 14 days  Hypoglycemia:no more  Polyuria/polydipsia : no  Weight change/BMI: yes lost  Diet: breakfast dinner , occasional lunch  Exercise: very limited due to patient recent medical problems with knee replacement and hip fracture , now in a wheelchair  HBa1c trend: not available       PMH & Surgical Hx:      FH:  DM:  Thyroid:  Autoimmune:  Other:    SH:  Smoking  Etoh:  Recreational Drugs:  Social Life:    Current Meds:  acetaminophen     Tablet .. 650 milliGRAM(s) Oral every 6 hours PRN  atorvastatin 80 milliGRAM(s) Oral at bedtime  dextrose 5%. 1000 milliLiter(s) IV Continuous <Continuous>  dextrose 5%. 1000 milliLiter(s) IV Continuous <Continuous>  dextrose 50% Injectable 25 Gram(s) IV Push once  dextrose 50% Injectable 12.5 Gram(s) IV Push once  dextrose 50% Injectable 25 Gram(s) IV Push once  dextrose Oral Gel 15 Gram(s) Oral once PRN  digoxin     Tablet 125 MICROGram(s) Oral every 24 hours  glucagon  Injectable 1 milliGRAM(s) IntraMuscular once  heparin   Injectable 5000 Unit(s) SubCutaneous every 8 hours  insulin glargine Injectable (LANTUS) 14 Unit(s) SubCutaneous at bedtime  insulin lispro (ADMELOG) corrective regimen sliding scale   SubCutaneous Before meals and at bedtime  levothyroxine 25 MICROGram(s) Oral daily  metoprolol tartrate Injectable 2.5 milliGRAM(s) IV Push every 6 hours  ondansetron Injectable 4 milliGRAM(s) IV Push every 6 hours PRN  pantoprazole    Tablet 40 milliGRAM(s) Oral before breakfast      Allergies:  ACE inhibitors (Hives)  carvedilol (Other)  enalapril (Hives)  Entresto (Other)      ROS:  Denies the following except as indicated.    General: weight loss/weight gain, decreased appetite, fatigue  Eyes: Blurry vision, double vision, visual changes  ENT: Throat pain, changes in voice,   CV: palpitations, SOB, CP, cough  GI: NVD, difficulty swallowing, abdominal pain  : polyuria, dysuria  Endo: abnormal menses, temperature intolerance, decreased libido  MSK: weakness, joint pain  Skin: rash, dryness, diaphoresis  Heme: Easy bruising,bleeding  Neuro: HA, dizziness, lightheadedness, numbness tingling  Psych: Anxiety, Depression    Vital Signs Last 24 Hrs  T(C): 36.9 (16 Jun 2022 13:12), Max: 37 (16 Jun 2022 09:02)  T(F): 98.4 (16 Jun 2022 13:12), Max: 98.6 (16 Jun 2022 09:02)  HR: 84 (16 Jun 2022 13:00) (82 - 94)  BP: 104/52 (16 Jun 2022 13:00) (104/52 - 131/71)  BP(mean): 75 (16 Jun 2022 13:00) (75 - 94)  RR: 18 (16 Jun 2022 13:00) (16 - 18)  SpO2: 95% (16 Jun 2022 13:00) (95% - 100%)  Height (cm): 185.4 (06-15 @ 17:06)  Weight (kg): 77.1 (06-15 @ 17:06)  BMI (kg/m2): 22.4 (06-15 @ 17:06)      Constitutional: NAD.   HEENT: NCAT, MMM, OP clear, EOMI, , no proptosis or lid retraction  Neck: no thyromegaly or palpable thyroid nodules   Respiratory: lungs CTAB.  Cardiovascular: regular rhythm, normal S1 and S2, no audible murmurs, no peripheral edema  GI: soft, NT/ND, no masses/HSM appreciated.  Neurology: no tremors, DTR 2+  Skin: no visible rashes/lesions  Psychiatric: AAO x 3, normal affect/mood.  Ext: radial pulses intact, DP pulses intact, extremities warm, no cyanosis, clubbing or edema.       LABS:                        10.9   9.08  )-----------( 307      ( 16 Jun 2022 05:39 )             36.8     06-16    137  |  104  |  27<H>  ----------------------------<  106<H>  4.2   |  23  |  0.97    Ca    8.8      16 Jun 2022 05:39  Phos  3.9     06-16  Mg     2.0     06-16    TPro  6.7  /  Alb  3.5  /  TBili  0.8  /  DBili  x   /  AST  22  /  ALT  22  /  AlkPhos  123<H>  06-16    PT/INR - ( 15 Nael 2022 18:50 )   PT: 15.0 sec;   INR: 1.26          PTT - ( 15 Nael 2022 18:50 )  PTT:26.7 sec      Thyroid Stimulating Hormone, Serum: 2.850 (01-08 @ 08:25)      RADIOLOGY & ADDITIONAL STUDIES:  CAPILLARY BLOOD GLUCOSE      POCT Blood Glucose.: 181 mg/dL (16 Jun 2022 12:54)  POCT Blood Glucose.: 124 mg/dL (16 Jun 2022 06:52)  POCT Blood Glucose.: 232 mg/dL (15 Nael 2022 22:00)        A/P:66M w/ PMHx of CAD s/p MIDCAB/VERONICA 2018, AICD (2/2020), CHF (EF 15-20%) uncontrolled DM, HLD, HTN, R TKA several years ago c/b recurrent PJI and MRSA bacteremia and multiple revisions AKA 1/2022 and history of acute cholecystitis s/p perc sudhir 2/28/20 (drain removed in late May 2020) c/b by cholecystocutaneous excision of cholecystocutaneous fistulous tract with Dr. King in 04/08/2021; continued to have persistent output from fistula tract and now for schedule cholecystectomy with Dr. King and blood sugar optimization.  A1c:  Wt:  Cr:  GFR:  home regimen: insulin pump     1.  DM type    Pt's fingerstick glucose goal is     Will continue to monitor     For discharge, pt can continue    Pt can follow up at discharge with  Dr Schmitt   Will discuss case with     and update primary team HPI: 66M w/ PMHx of CAD s/p MIDCAB/VERONICA 2018, AICD (2/2020), CHF (EF 15-20%) uncontrolled DM, HLD, HTN, R TKA several years ago c/b recurrent PJI and multiple revisions  in 09/2020 by Dr. 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 King in 04/08/2021; continued to have persistent output from fistula tract and now presents to the ED for  patient presents for schedule cholecystectomy with Dr. King.   Patient took plavix two days ago and surgery is to be postponed.   Endocrinology consulted for optimization of diabetes management.  diagnosed 30 years ago.  Previous patient of Dr Cormier and now sees Dr Schmitt in [place]    He was on medtronic 670g prior. Settings per Dr Schmitt's notes - Basal rate 2.0  ICR 10, ISF 40.      Diagnosis >30 years  Current Regimen: insulin Humalog in pump  Previous regimens: was on po meds but taken off , has been on insulin pump for 5-6 years now  Compliance: ok , NOT using bolus wizard  SMBG/CGM : has Freestyle storm 14 days  Hypoglycemia:no more  Polyuria/polydipsia : no  Weight change/BMI: yes lost  Diet: breakfast dinner , occasional lunch  Exercise: very limited due to patient recent medical problems with knee replacement and hip fracture , now in a wheelchair  HBa1c trend: not available     Patient known to endocrine service from prior admissions.  After discharge in January patient resumed back on insulin pump at basal rate of 2U/hr. Followed up with endo in February, was reported to not be bolusing for his meals appropriately.   For the past 2-3weeks he reduced his basal rate to 0.6U, unable to state if recommended by Endo.   14units Lantus was given to him overnight based on basal rate of pump.     Current Meds:  acetaminophen     Tablet .. 650 milliGRAM(s) Oral every 6 hours PRN  atorvastatin 80 milliGRAM(s) Oral at bedtime  dextrose 5%. 1000 milliLiter(s) IV Continuous <Continuous>  dextrose 5%. 1000 milliLiter(s) IV Continuous <Continuous>  dextrose 50% Injectable 25 Gram(s) IV Push once  dextrose 50% Injectable 12.5 Gram(s) IV Push once  dextrose 50% Injectable 25 Gram(s) IV Push once  dextrose Oral Gel 15 Gram(s) Oral once PRN  digoxin     Tablet 125 MICROGram(s) Oral every 24 hours  glucagon  Injectable 1 milliGRAM(s) IntraMuscular once  heparin   Injectable 5000 Unit(s) SubCutaneous every 8 hours  insulin glargine Injectable (LANTUS) 14 Unit(s) SubCutaneous at bedtime  insulin lispro (ADMELOG) corrective regimen sliding scale   SubCutaneous Before meals and at bedtime  levothyroxine 25 MICROGram(s) Oral daily  metoprolol tartrate Injectable 2.5 milliGRAM(s) IV Push every 6 hours  ondansetron Injectable 4 milliGRAM(s) IV Push every 6 hours PRN  pantoprazole    Tablet 40 milliGRAM(s) Oral before breakfast      Allergies:  ACE inhibitors (Hives)  carvedilol (Other)  enalapril (Hives)  Entresto (Other)      ROS:  Denies the following except as indicated.    General: weight loss/weight gain, decreased appetite, fatigue  Eyes: Blurry vision, double vision, visual changes  ENT: Throat pain, changes in voice,   CV: palpitations, SOB, CP, cough  GI: NVD, difficulty swallowing, abdominal pain  : polyuria, dysuria  Endo: abnormal menses, temperature intolerance, decreased libido  MSK: weakness, joint pain  Skin: rash, dryness, diaphoresis  Heme: Easy bruising,bleeding  Neuro: HA, dizziness, lightheadedness, numbness tingling  Psych: Anxiety, Depression    Vital Signs Last 24 Hrs  T(C): 36.9 (16 Jun 2022 13:12), Max: 37 (16 Jun 2022 09:02)  T(F): 98.4 (16 Jun 2022 13:12), Max: 98.6 (16 Jun 2022 09:02)  HR: 84 (16 Jun 2022 13:00) (82 - 94)  BP: 104/52 (16 Jun 2022 13:00) (104/52 - 131/71)  BP(mean): 75 (16 Jun 2022 13:00) (75 - 94)  RR: 18 (16 Jun 2022 13:00) (16 - 18)  SpO2: 95% (16 Jun 2022 13:00) (95% - 100%)  Height (cm): 185.4 (06-15 @ 17:06)  Weight (kg): 77.1 (06-15 @ 17:06)  BMI (kg/m2): 22.4 (06-15 @ 17:06)      Physical Exam: GENERAL: NAD, Resting comfortably in bed  HEENT: NCAT, MMM, Normal conjunctiva, PERRL  RESP: Nonlabored breathing, No respiratory distress  CARD: Normal rate, Normal peripheral perfusion  GI: Soft, ND, NT, No guarding, No rebound tenderness; RUQ fistula with minimal yellow pus drainage over dressing; no surrounding erethyma, fluctance, tenderness, or skin break down; prior surgical scars   EXTREM: WWP, No edema, R AKA, left ankle wound in dressing   NEURO: AAOx3, No focal motor or sensory deficits    LABS:                        10.9   9.08  )-----------( 307      ( 16 Jun 2022 05:39 )             36.8     06-16    137  |  104  |  27<H>  ----------------------------<  106<H>  4.2   |  23  |  0.97    Ca    8.8      16 Jun 2022 05:39  Phos  3.9     06-16  Mg     2.0     06-16    TPro  6.7  /  Alb  3.5  /  TBili  0.8  /  DBili  x   /  AST  22  /  ALT  22  /  AlkPhos  123<H>  06-16    PT/INR - ( 15 Nael 2022 18:50 )   PT: 15.0 sec;   INR: 1.26          PTT - ( 15 Nael 2022 18:50 )  PTT:26.7 sec      Thyroid Stimulating Hormone, Serum: 2.850 (01-08 @ 08:25)      RADIOLOGY & ADDITIONAL STUDIES:  CAPILLARY BLOOD GLUCOSE      POCT Blood Glucose.: 181 mg/dL (16 Jun 2022 12:54)  POCT Blood Glucose.: 124 mg/dL (16 Jun 2022 06:52)  POCT Blood Glucose.: 232 mg/dL (15 Nael 2022 22:00)        A/P:66M w/ PMHx of CAD s/p MIDCAB/VERONICA 2018, AICD (2/2020), CHF (EF 15-20%) uncontrolled DM, HLD, HTN, R TKA several years ago c/b recurrent PJI and MRSA bacteremia and multiple revisions AKA 1/2022 and history of acute cholecystitis s/p perc sudhir 2/28/20 (drain removed in late May 2020) c/b by cholecystocutaneous excision of cholecystocutaneous fistulous tract with Dr. King in 04/08/2021; continued to have persistent output from fistula tract and now for schedule cholecystectomy with Dr. King and blood sugar optimization.  A1c:12%  Wt:77kg  Cr:0.97  GFR:87  home regimen: insulin pump     1.  DM type 2- uncontrolled   Patient was given 14units Lantus last night based on pump settings, did well overnight  Will recommend discharging patient on current pump settings  Advised patient to bolus properly for meals at home, he has not been compliant with diet or bolusing, likely cause of hyperglycemia outpatient.     Pt's fingerstick glucose goal is 100-180    Will continue to monitor     For discharge, pt can continue insulin pump, to restart at 10pm today     Pt can follow up at discharge with  Dr Schmitt   Will discuss case with     and update primary team

## 2022-06-20 ENCOUNTER — LABORATORY RESULT (OUTPATIENT)
Age: 67
End: 2022-06-20

## 2022-06-21 ENCOUNTER — TRANSCRIPTION ENCOUNTER (OUTPATIENT)
Age: 67
End: 2022-06-21

## 2022-06-21 RX ORDER — DULOXETINE HYDROCHLORIDE 30 MG/1
1 CAPSULE, DELAYED RELEASE ORAL
Qty: 0 | Refills: 0 | DISCHARGE

## 2022-06-21 RX ORDER — TAMSULOSIN HYDROCHLORIDE 0.4 MG/1
1 CAPSULE ORAL
Qty: 0 | Refills: 0 | DISCHARGE

## 2022-06-21 NOTE — PATIENT PROFILE ADULT - FALL HARM RISK - HARM RISK INTERVENTIONS
Assistance with ambulation/Assistance OOB with selected safe patient handling equipment/Communicate Risk of Fall with Harm to all staff/Discuss with provider need for PT consult/Monitor gait and stability/Reinforce activity limits and safety measures with patient and family/Tailored Fall Risk Interventions/Visual Cue: Yellow wristband and red socks/Bed in lowest position, wheels locked, appropriate side rails in place/Call bell, personal items and telephone in reach/Instruct patient to call for assistance before getting out of bed or chair/Non-slip footwear when patient is out of bed/[place] to call system/Physically safe environment - no spills, clutter or unnecessary equipment/Purposeful Proactive Rounding/Room/bathroom lighting operational, light cord in reach

## 2022-06-22 ENCOUNTER — TRANSCRIPTION ENCOUNTER (OUTPATIENT)
Age: 67
End: 2022-06-22

## 2022-06-22 ENCOUNTER — RESULT REVIEW (OUTPATIENT)
Age: 67
End: 2022-06-22

## 2022-06-22 ENCOUNTER — APPOINTMENT (OUTPATIENT)
Dept: SURGICAL ONCOLOGY | Facility: HOSPITAL | Age: 67
End: 2022-06-22

## 2022-06-22 ENCOUNTER — INPATIENT (INPATIENT)
Facility: HOSPITAL | Age: 67
LOS: 5 days | Discharge: HOME | DRG: 417 | End: 2022-06-28
Attending: SURGERY | Admitting: SURGERY
Payer: MEDICARE

## 2022-06-22 VITALS
TEMPERATURE: 97 F | HEART RATE: 71 BPM | HEIGHT: 73 IN | SYSTOLIC BLOOD PRESSURE: 110 MMHG | DIASTOLIC BLOOD PRESSURE: 67 MMHG | OXYGEN SATURATION: 98 % | RESPIRATION RATE: 16 BRPM | WEIGHT: 174.17 LBS

## 2022-06-22 DIAGNOSIS — Z41.9 ENCOUNTER FOR PROCEDURE FOR PURPOSES OTHER THAN REMEDYING HEALTH STATE, UNSPECIFIED: Chronic | ICD-10-CM

## 2022-06-22 DIAGNOSIS — Z95.810 PRESENCE OF AUTOMATIC (IMPLANTABLE) CARDIAC DEFIBRILLATOR: Chronic | ICD-10-CM

## 2022-06-22 DIAGNOSIS — Z96.641 PRESENCE OF RIGHT ARTIFICIAL HIP JOINT: Chronic | ICD-10-CM

## 2022-06-22 DIAGNOSIS — Z95.5 PRESENCE OF CORONARY ANGIOPLASTY IMPLANT AND GRAFT: Chronic | ICD-10-CM

## 2022-06-22 DIAGNOSIS — Z43.4 ENCOUNTER FOR ATTENTION TO OTHER ARTIFICIAL OPENINGS OF DIGESTIVE TRACT: Chronic | ICD-10-CM

## 2022-06-22 DIAGNOSIS — Z90.89 ACQUIRED ABSENCE OF OTHER ORGANS: Chronic | ICD-10-CM

## 2022-06-22 DIAGNOSIS — Z96.651 PRESENCE OF RIGHT ARTIFICIAL KNEE JOINT: Chronic | ICD-10-CM

## 2022-06-22 DIAGNOSIS — Z98.890 OTHER SPECIFIED POSTPROCEDURAL STATES: Chronic | ICD-10-CM

## 2022-06-22 DIAGNOSIS — Z95.1 PRESENCE OF AORTOCORONARY BYPASS GRAFT: Chronic | ICD-10-CM

## 2022-06-22 LAB
ALBUMIN SERPL ELPH-MCNC: 3.9 G/DL — SIGNIFICANT CHANGE UP (ref 3.3–5)
ALP SERPL-CCNC: 216 U/L — HIGH (ref 40–120)
ALT FLD-CCNC: 166 U/L — HIGH (ref 10–45)
ANION GAP SERPL CALC-SCNC: 17 MMOL/L — SIGNIFICANT CHANGE UP (ref 5–17)
AST SERPL-CCNC: 186 U/L — HIGH (ref 10–40)
BILIRUB SERPL-MCNC: 1.1 MG/DL — SIGNIFICANT CHANGE UP (ref 0.2–1.2)
BLD GP AB SCN SERPL QL: NEGATIVE — SIGNIFICANT CHANGE UP
BUN SERPL-MCNC: 35 MG/DL — HIGH (ref 7–23)
CALCIUM SERPL-MCNC: 8.8 MG/DL — SIGNIFICANT CHANGE UP (ref 8.4–10.5)
CHLORIDE SERPL-SCNC: 101 MMOL/L — SIGNIFICANT CHANGE UP (ref 96–108)
CO2 SERPL-SCNC: 17 MMOL/L — LOW (ref 22–31)
CREAT SERPL-MCNC: 1.31 MG/DL — HIGH (ref 0.5–1.3)
EGFR: 61 ML/MIN/1.73M2 — SIGNIFICANT CHANGE UP
GLUCOSE BLDC GLUCOMTR-MCNC: 107 MG/DL — HIGH (ref 70–99)
GLUCOSE BLDC GLUCOMTR-MCNC: 154 MG/DL — HIGH (ref 70–99)
GLUCOSE BLDC GLUCOMTR-MCNC: 74 MG/DL — SIGNIFICANT CHANGE UP (ref 70–99)
GLUCOSE SERPL-MCNC: 192 MG/DL — HIGH (ref 70–99)
HCT VFR BLD CALC: 37.9 % — LOW (ref 39–50)
HGB BLD-MCNC: 11.1 G/DL — LOW (ref 13–17)
MAGNESIUM SERPL-MCNC: 2 MG/DL — SIGNIFICANT CHANGE UP (ref 1.6–2.6)
MCHC RBC-ENTMCNC: 22 PG — LOW (ref 27–34)
MCHC RBC-ENTMCNC: 29.3 GM/DL — LOW (ref 32–36)
MCV RBC AUTO: 75.2 FL — LOW (ref 80–100)
NRBC # BLD: 0 /100 WBCS — SIGNIFICANT CHANGE UP (ref 0–0)
PHOSPHATE SERPL-MCNC: 5.4 MG/DL — HIGH (ref 2.5–4.5)
PLATELET # BLD AUTO: 336 K/UL — SIGNIFICANT CHANGE UP (ref 150–400)
POTASSIUM SERPL-MCNC: 6.2 MMOL/L — CRITICAL HIGH (ref 3.5–5.3)
POTASSIUM SERPL-SCNC: 6.2 MMOL/L — CRITICAL HIGH (ref 3.5–5.3)
PROT SERPL-MCNC: 7.2 G/DL — SIGNIFICANT CHANGE UP (ref 6–8.3)
RBC # BLD: 5.04 M/UL — SIGNIFICANT CHANGE UP (ref 4.2–5.8)
RBC # FLD: 19.2 % — HIGH (ref 10.3–14.5)
RH IG SCN BLD-IMP: POSITIVE — SIGNIFICANT CHANGE UP
SODIUM SERPL-SCNC: 135 MMOL/L — SIGNIFICANT CHANGE UP (ref 135–145)
WBC # BLD: 19.09 K/UL — HIGH (ref 3.8–10.5)
WBC # FLD AUTO: 19.09 K/UL — HIGH (ref 3.8–10.5)

## 2022-06-22 PROCEDURE — 47562 LAPAROSCOPIC CHOLECYSTECTOMY: CPT | Mod: 22

## 2022-06-22 PROCEDURE — 71045 X-RAY EXAM CHEST 1 VIEW: CPT | Mod: 26

## 2022-06-22 PROCEDURE — 88304 TISSUE EXAM BY PATHOLOGIST: CPT | Mod: 26

## 2022-06-22 DEVICE — SURGIFLO HEMOSTATIC MATRIX KIT: Type: IMPLANTABLE DEVICE | Status: FUNCTIONAL

## 2022-06-22 DEVICE — STAPLER COVIDIEN TRI-STAPLE 45MM TAN RELOAD: Type: IMPLANTABLE DEVICE | Status: FUNCTIONAL

## 2022-06-22 DEVICE — STAPLER COVIDIEN TRI-STAPLE 60MM TAN RELOAD: Type: IMPLANTABLE DEVICE | Status: FUNCTIONAL

## 2022-06-22 DEVICE — STAPLER COVIDIEN TRI-STAPLE 45MM PURPLE RELOAD: Type: IMPLANTABLE DEVICE | Status: FUNCTIONAL

## 2022-06-22 DEVICE — STAPLER COVIDIEN TRI-STAPLE 60MM PURPLE RELOAD: Type: IMPLANTABLE DEVICE | Status: FUNCTIONAL

## 2022-06-22 RX ORDER — DIGOXIN 250 MCG
125 TABLET ORAL EVERY 24 HOURS
Refills: 0 | Status: DISCONTINUED | OUTPATIENT
Start: 2022-06-23 | End: 2022-06-28

## 2022-06-22 RX ORDER — GLUCAGON INJECTION, SOLUTION 0.5 MG/.1ML
1 INJECTION, SOLUTION SUBCUTANEOUS ONCE
Refills: 0 | Status: DISCONTINUED | OUTPATIENT
Start: 2022-06-22 | End: 2022-06-28

## 2022-06-22 RX ORDER — SODIUM CHLORIDE 9 MG/ML
1000 INJECTION, SOLUTION INTRAVENOUS
Refills: 0 | Status: DISCONTINUED | OUTPATIENT
Start: 2022-06-22 | End: 2022-06-28

## 2022-06-22 RX ORDER — HYDROMORPHONE HYDROCHLORIDE 2 MG/ML
1 INJECTION INTRAMUSCULAR; INTRAVENOUS; SUBCUTANEOUS EVERY 6 HOURS
Refills: 0 | Status: DISCONTINUED | OUTPATIENT
Start: 2022-06-22 | End: 2022-06-24

## 2022-06-22 RX ORDER — METOPROLOL TARTRATE 50 MG
2.5 TABLET ORAL EVERY 6 HOURS
Refills: 0 | Status: DISCONTINUED | OUTPATIENT
Start: 2022-06-22 | End: 2022-06-27

## 2022-06-22 RX ORDER — DEXTROSE 50 % IN WATER 50 %
25 SYRINGE (ML) INTRAVENOUS ONCE
Refills: 0 | Status: DISCONTINUED | OUTPATIENT
Start: 2022-06-22 | End: 2022-06-28

## 2022-06-22 RX ORDER — PANTOPRAZOLE SODIUM 20 MG/1
40 TABLET, DELAYED RELEASE ORAL EVERY 24 HOURS
Refills: 0 | Status: DISCONTINUED | OUTPATIENT
Start: 2022-06-22 | End: 2022-06-28

## 2022-06-22 RX ORDER — DEXTROSE 50 % IN WATER 50 %
15 SYRINGE (ML) INTRAVENOUS ONCE
Refills: 0 | Status: DISCONTINUED | OUTPATIENT
Start: 2022-06-22 | End: 2022-06-28

## 2022-06-22 RX ORDER — ONDANSETRON 8 MG/1
4 TABLET, FILM COATED ORAL EVERY 6 HOURS
Refills: 0 | Status: DISCONTINUED | OUTPATIENT
Start: 2022-06-22 | End: 2022-06-28

## 2022-06-22 RX ORDER — LEVOTHYROXINE SODIUM 125 MCG
25 TABLET ORAL DAILY
Refills: 0 | Status: DISCONTINUED | OUTPATIENT
Start: 2022-06-22 | End: 2022-06-28

## 2022-06-22 RX ORDER — SODIUM CHLORIDE 9 MG/ML
1000 INJECTION, SOLUTION INTRAVENOUS
Refills: 0 | Status: DISCONTINUED | OUTPATIENT
Start: 2022-06-22 | End: 2022-06-23

## 2022-06-22 RX ORDER — HYDROMORPHONE HYDROCHLORIDE 2 MG/ML
0.5 INJECTION INTRAMUSCULAR; INTRAVENOUS; SUBCUTANEOUS EVERY 4 HOURS
Refills: 0 | Status: DISCONTINUED | OUTPATIENT
Start: 2022-06-22 | End: 2022-06-24

## 2022-06-22 RX ORDER — DEXTROSE 50 % IN WATER 50 %
12.5 SYRINGE (ML) INTRAVENOUS ONCE
Refills: 0 | Status: DISCONTINUED | OUTPATIENT
Start: 2022-06-22 | End: 2022-06-28

## 2022-06-22 RX ORDER — INSULIN LISPRO 100/ML
VIAL (ML) SUBCUTANEOUS EVERY 6 HOURS
Refills: 0 | Status: DISCONTINUED | OUTPATIENT
Start: 2022-06-22 | End: 2022-06-23

## 2022-06-22 RX ORDER — CHLORHEXIDINE GLUCONATE 213 G/1000ML
1 SOLUTION TOPICAL
Refills: 0 | Status: DISCONTINUED | OUTPATIENT
Start: 2022-06-22 | End: 2022-06-22

## 2022-06-22 RX ORDER — ASPIRIN/CALCIUM CARB/MAGNESIUM 324 MG
81 TABLET ORAL EVERY 24 HOURS
Refills: 0 | Status: DISCONTINUED | OUTPATIENT
Start: 2022-06-23 | End: 2022-06-28

## 2022-06-22 RX ADMIN — SODIUM CHLORIDE 50 MILLILITER(S): 9 INJECTION, SOLUTION INTRAVENOUS at 23:56

## 2022-06-22 RX ADMIN — PANTOPRAZOLE SODIUM 40 MILLIGRAM(S): 20 TABLET, DELAYED RELEASE ORAL at 23:09

## 2022-06-22 RX ADMIN — Medication 2.5 MILLIGRAM(S): at 23:09

## 2022-06-22 RX ADMIN — Medication 2: at 23:08

## 2022-06-22 RX ADMIN — HYDROMORPHONE HYDROCHLORIDE 0.5 MILLIGRAM(S): 2 INJECTION INTRAMUSCULAR; INTRAVENOUS; SUBCUTANEOUS at 22:51

## 2022-06-22 RX ADMIN — HYDROMORPHONE HYDROCHLORIDE 0.5 MILLIGRAM(S): 2 INJECTION INTRAMUSCULAR; INTRAVENOUS; SUBCUTANEOUS at 23:57

## 2022-06-22 NOTE — H&P ADULT - HISTORY OF PRESENT ILLNESS
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 Castro (explant and abx spacer exchange), and most recently transferred to [facility] on 1/6 from 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 King in 04/08/2021, presenting for elective resection of fistula tract.

## 2022-06-22 NOTE — BRIEF OPERATIVE NOTE - NSICDXBRIEFPROCEDURE_GEN_ALL_CORE_FT
PROCEDURES:  Excision of fistula tract of abdomen 22-Jun-2022 23:08:27  Xochilt Cai  Laparoscopic partial cholecystectomy 22-Jun-2022 23:08:38  Xochilt Cai

## 2022-06-22 NOTE — PRE-OP CHECKLIST - SELECT TESTS ORDERED
Insulin Pump/BMP/CBC/PT/PTT/INR/Type and Screen/EKG/POCT Blood Glucose/COVID-19 Insulin Pump - BS - 74 @ 1100, BS - 107 @ 1230/BMP/CBC/PT/PTT/INR/Type and Screen/EKG/POCT Blood Glucose/COVID-19

## 2022-06-22 NOTE — BRIEF OPERATIVE NOTE - OPERATION/FINDINGS
Laparoscopic ports placed under direct visualization. Fistula tract identified extending from abdominal wall intrahepatically. Elliptical incision made around external fistula site with dissection and excision of fistula tract in subcutaneous tissue. Intra-abdominally, distal end of fistula tract was ligated. Proximal end of fistula tract opened and cauterized with extension to gallbladder fossa. Partial cholecystectomy with cauterization of gallbladder wall. Fascia closed with Vicryl, Ports closed with monocryl. Previous fistula site closed with staples and wet-to-dry packing.

## 2022-06-22 NOTE — PRE-OP CHECKLIST - 3.
BS - 119 from insulin pump BS - 74 @ [facility], BS - 107 @ [facility] - Anesthesia notified

## 2022-06-22 NOTE — BRIEF OPERATIVE NOTE - NSICDXBRIEFPREOP_GEN_ALL_CORE_FT
PRE-OP DIAGNOSIS:  H/O acute cholecystitis 22-Jun-2022 23:09:30 cholecystoenteric fistula   Xochilt Cai

## 2022-06-22 NOTE — BRIEF OPERATIVE NOTE - NSICDXBRIEFPOSTOP_GEN_ALL_CORE_FT
POST-OP DIAGNOSIS:  H/O acute cholecystitis 22-Jun-2022 23:09:54 cholecystoenteric fistula Xochilt Cai

## 2022-06-22 NOTE — H&P ADULT - ASSESSMENT
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 King in 04/08/2021, presenting for elective resection of fistula tract.     To OR for resection of fistula tract, possible lap cholecystectomy  Admit to Dr. King postop

## 2022-06-23 LAB
ALBUMIN SERPL ELPH-MCNC: 3.5 G/DL — SIGNIFICANT CHANGE UP (ref 3.3–5)
ALP SERPL-CCNC: 193 U/L — HIGH (ref 40–120)
ALT FLD-CCNC: 163 U/L — HIGH (ref 10–45)
ANION GAP SERPL CALC-SCNC: 14 MMOL/L — SIGNIFICANT CHANGE UP (ref 5–17)
ANION GAP SERPL CALC-SCNC: 15 MMOL/L — SIGNIFICANT CHANGE UP (ref 5–17)
ANION GAP SERPL CALC-SCNC: 15 MMOL/L — SIGNIFICANT CHANGE UP (ref 5–17)
APPEARANCE UR: CLEAR — SIGNIFICANT CHANGE UP
AST SERPL-CCNC: 134 U/L — HIGH (ref 10–40)
BACTERIA # UR AUTO: PRESENT /HPF
BILIRUB SERPL-MCNC: 0.8 MG/DL — SIGNIFICANT CHANGE UP (ref 0.2–1.2)
BILIRUB SERPL-MCNC: 0.8 MG/DL — SIGNIFICANT CHANGE UP (ref 0.2–1.2)
BILIRUB UR-MCNC: NEGATIVE — SIGNIFICANT CHANGE UP
BUN SERPL-MCNC: 37 MG/DL — HIGH (ref 7–23)
BUN SERPL-MCNC: 45 MG/DL — HIGH (ref 7–23)
BUN SERPL-MCNC: 49 MG/DL — HIGH (ref 7–23)
CALCIUM SERPL-MCNC: 8.4 MG/DL — SIGNIFICANT CHANGE UP (ref 8.4–10.5)
CALCIUM SERPL-MCNC: 8.6 MG/DL — SIGNIFICANT CHANGE UP (ref 8.4–10.5)
CALCIUM SERPL-MCNC: 8.6 MG/DL — SIGNIFICANT CHANGE UP (ref 8.4–10.5)
CHLORIDE SERPL-SCNC: 100 MMOL/L — SIGNIFICANT CHANGE UP (ref 96–108)
CHLORIDE SERPL-SCNC: 98 MMOL/L — SIGNIFICANT CHANGE UP (ref 96–108)
CHLORIDE SERPL-SCNC: 98 MMOL/L — SIGNIFICANT CHANGE UP (ref 96–108)
CO2 SERPL-SCNC: 19 MMOL/L — LOW (ref 22–31)
CO2 SERPL-SCNC: 20 MMOL/L — LOW (ref 22–31)
CO2 SERPL-SCNC: 20 MMOL/L — LOW (ref 22–31)
COLOR SPEC: ABNORMAL
COMMENT - URINE: SIGNIFICANT CHANGE UP
CREAT ?TM UR-MCNC: 97 MG/DL — SIGNIFICANT CHANGE UP
CREAT SERPL-MCNC: 1.3 MG/DL — SIGNIFICANT CHANGE UP (ref 0.5–1.3)
CREAT SERPL-MCNC: 1.45 MG/DL — HIGH (ref 0.5–1.3)
CREAT SERPL-MCNC: 1.51 MG/DL — HIGH (ref 0.5–1.3)
CREAT SERPL-MCNC: 1.64 MG/DL — HIGH (ref 0.5–1.3)
DIFF PNL FLD: NEGATIVE — SIGNIFICANT CHANGE UP
EGFR: 46 ML/MIN/1.73M2 — LOW
EGFR: 51 ML/MIN/1.73M2 — LOW
EGFR: 54 ML/MIN/1.73M2 — LOW
EGFR: 61 ML/MIN/1.73M2 — SIGNIFICANT CHANGE UP
EPI CELLS # UR: SIGNIFICANT CHANGE UP /HPF (ref 0–5)
GLUCOSE BLDC GLUCOMTR-MCNC: 191 MG/DL — HIGH (ref 70–99)
GLUCOSE BLDC GLUCOMTR-MCNC: 273 MG/DL — HIGH (ref 70–99)
GLUCOSE BLDC GLUCOMTR-MCNC: 289 MG/DL — HIGH (ref 70–99)
GLUCOSE BLDC GLUCOMTR-MCNC: 326 MG/DL — HIGH (ref 70–99)
GLUCOSE BLDC GLUCOMTR-MCNC: 339 MG/DL — HIGH (ref 70–99)
GLUCOSE BLDC GLUCOMTR-MCNC: 341 MG/DL — HIGH (ref 70–99)
GLUCOSE BLDC GLUCOMTR-MCNC: 345 MG/DL — HIGH (ref 70–99)
GLUCOSE SERPL-MCNC: 270 MG/DL — HIGH (ref 70–99)
GLUCOSE SERPL-MCNC: 311 MG/DL — HIGH (ref 70–99)
GLUCOSE SERPL-MCNC: 383 MG/DL — HIGH (ref 70–99)
GLUCOSE UR QL: 500
GRAN CASTS # UR COMP ASSIST: ABNORMAL /LPF
HCT VFR BLD CALC: 39.7 % — SIGNIFICANT CHANGE UP (ref 39–50)
HGB BLD-MCNC: 11.6 G/DL — LOW (ref 13–17)
HYALINE CASTS # UR AUTO: ABNORMAL /LPF (ref 0–2)
INR BLD: 1.63 — HIGH (ref 0.88–1.16)
KETONES UR-MCNC: NEGATIVE — SIGNIFICANT CHANGE UP
LEUKOCYTE ESTERASE UR-ACNC: ABNORMAL
MAGNESIUM SERPL-MCNC: 1.9 MG/DL — SIGNIFICANT CHANGE UP (ref 1.6–2.6)
MAGNESIUM SERPL-MCNC: 1.9 MG/DL — SIGNIFICANT CHANGE UP (ref 1.6–2.6)
MCHC RBC-ENTMCNC: 22.3 PG — LOW (ref 27–34)
MCHC RBC-ENTMCNC: 29.2 GM/DL — LOW (ref 32–36)
MCV RBC AUTO: 76.3 FL — LOW (ref 80–100)
MELD SCORE WITH DIALYSIS: SIGNIFICANT CHANGE UP POINTS
MELD SCORE WITHOUT DIALYSIS: SIGNIFICANT CHANGE UP POINTS
NITRITE UR-MCNC: NEGATIVE — SIGNIFICANT CHANGE UP
NRBC # BLD: 0 /100 WBCS — SIGNIFICANT CHANGE UP (ref 0–0)
OSMOLALITY UR: 502 MOSM/KG — SIGNIFICANT CHANGE UP (ref 300–900)
PH UR: 5.5 — SIGNIFICANT CHANGE UP (ref 5–8)
PHOSPHATE SERPL-MCNC: 5.5 MG/DL — HIGH (ref 2.5–4.5)
PHOSPHATE SERPL-MCNC: 5.6 MG/DL — HIGH (ref 2.5–4.5)
PLATELET # BLD AUTO: 309 K/UL — SIGNIFICANT CHANGE UP (ref 150–400)
POTASSIUM SERPL-MCNC: 4.6 MMOL/L — SIGNIFICANT CHANGE UP (ref 3.5–5.3)
POTASSIUM SERPL-MCNC: 5.4 MMOL/L — HIGH (ref 3.5–5.3)
POTASSIUM SERPL-MCNC: 5.9 MMOL/L — HIGH (ref 3.5–5.3)
POTASSIUM SERPL-SCNC: 4.6 MMOL/L — SIGNIFICANT CHANGE UP (ref 3.5–5.3)
POTASSIUM SERPL-SCNC: 5.4 MMOL/L — HIGH (ref 3.5–5.3)
POTASSIUM SERPL-SCNC: 5.9 MMOL/L — HIGH (ref 3.5–5.3)
POTASSIUM UR-SCNC: 67 MMOL/L — SIGNIFICANT CHANGE UP
PROT ?TM UR-MCNC: 64 MG/DL — HIGH (ref 0–12)
PROT SERPL-MCNC: 7 G/DL — SIGNIFICANT CHANGE UP (ref 6–8.3)
PROT UR-MCNC: 30 MG/DL
PROT/CREAT UR-RTO: 0.7 RATIO — HIGH (ref 0–0.2)
PROTHROM AB SERPL-ACNC: 19.5 SEC — HIGH (ref 10.5–13.4)
RBC # BLD: 5.2 M/UL — SIGNIFICANT CHANGE UP (ref 4.2–5.8)
RBC # FLD: 19.5 % — HIGH (ref 10.3–14.5)
RBC CASTS # UR COMP ASSIST: < 5 /HPF — SIGNIFICANT CHANGE UP
SODIUM SERPL-SCNC: 132 MMOL/L — LOW (ref 135–145)
SODIUM SERPL-SCNC: 133 MMOL/L — LOW (ref 135–145)
SODIUM SERPL-SCNC: 134 MMOL/L — LOW (ref 135–145)
SODIUM SERPL-SCNC: 134 MMOL/L — LOW (ref 135–145)
SODIUM UR-SCNC: 20 MMOL/L — SIGNIFICANT CHANGE UP
SP GR SPEC: >=1.03 — SIGNIFICANT CHANGE UP (ref 1–1.03)
UROBILINOGEN FLD QL: 0.2 E.U./DL — SIGNIFICANT CHANGE UP
UUN UR-MCNC: 705 MG/DL — SIGNIFICANT CHANGE UP
WBC # BLD: 19.26 K/UL — HIGH (ref 3.8–10.5)
WBC # FLD AUTO: 19.26 K/UL — HIGH (ref 3.8–10.5)
WBC UR QL: ABNORMAL /HPF

## 2022-06-23 PROCEDURE — 71045 X-RAY EXAM CHEST 1 VIEW: CPT | Mod: 26

## 2022-06-23 PROCEDURE — 93010 ELECTROCARDIOGRAM REPORT: CPT

## 2022-06-23 PROCEDURE — 99223 1ST HOSP IP/OBS HIGH 75: CPT

## 2022-06-23 RX ORDER — SODIUM ZIRCONIUM CYCLOSILICATE 10 G/10G
10 POWDER, FOR SUSPENSION ORAL EVERY 8 HOURS
Refills: 0 | Status: DISCONTINUED | OUTPATIENT
Start: 2022-06-23 | End: 2022-06-26

## 2022-06-23 RX ORDER — SODIUM POLYSTYRENE SULFONATE 4.1 MEQ/G
15 POWDER, FOR SUSPENSION ORAL ONCE
Refills: 0 | Status: COMPLETED | OUTPATIENT
Start: 2022-06-23 | End: 2022-06-23

## 2022-06-23 RX ORDER — SODIUM ZIRCONIUM CYCLOSILICATE 10 G/10G
10 POWDER, FOR SUSPENSION ORAL ONCE
Refills: 0 | Status: COMPLETED | OUTPATIENT
Start: 2022-06-23 | End: 2022-06-23

## 2022-06-23 RX ORDER — SODIUM CHLORIDE 9 MG/ML
1000 INJECTION INTRAMUSCULAR; INTRAVENOUS; SUBCUTANEOUS
Refills: 0 | Status: DISCONTINUED | OUTPATIENT
Start: 2022-06-23 | End: 2022-06-24

## 2022-06-23 RX ORDER — HYDROMORPHONE HYDROCHLORIDE 2 MG/ML
0.25 INJECTION INTRAMUSCULAR; INTRAVENOUS; SUBCUTANEOUS ONCE
Refills: 0 | Status: DISCONTINUED | OUTPATIENT
Start: 2022-06-23 | End: 2022-06-23

## 2022-06-23 RX ORDER — CHLORHEXIDINE GLUCONATE 213 G/1000ML
1 SOLUTION TOPICAL
Refills: 0 | Status: DISCONTINUED | OUTPATIENT
Start: 2022-06-23 | End: 2022-06-28

## 2022-06-23 RX ORDER — DEXTROSE 50 % IN WATER 50 %
50 SYRINGE (ML) INTRAVENOUS ONCE
Refills: 0 | Status: COMPLETED | OUTPATIENT
Start: 2022-06-23 | End: 2022-06-23

## 2022-06-23 RX ORDER — INSULIN HUMAN 100 [IU]/ML
10 INJECTION, SOLUTION SUBCUTANEOUS ONCE
Refills: 0 | Status: COMPLETED | OUTPATIENT
Start: 2022-06-23 | End: 2022-06-23

## 2022-06-23 RX ORDER — CALCIUM GLUCONATE 100 MG/ML
1 VIAL (ML) INTRAVENOUS ONCE
Refills: 0 | Status: COMPLETED | OUTPATIENT
Start: 2022-06-23 | End: 2022-06-23

## 2022-06-23 RX ORDER — HEPARIN SODIUM 5000 [USP'U]/ML
5000 INJECTION INTRAVENOUS; SUBCUTANEOUS EVERY 8 HOURS
Refills: 0 | Status: DISCONTINUED | OUTPATIENT
Start: 2022-06-23 | End: 2022-06-28

## 2022-06-23 RX ADMIN — HEPARIN SODIUM 5000 UNIT(S): 5000 INJECTION INTRAVENOUS; SUBCUTANEOUS at 16:00

## 2022-06-23 RX ADMIN — Medication 50 MILLILITER(S): at 17:55

## 2022-06-23 RX ADMIN — INSULIN HUMAN 10 UNIT(S): 100 INJECTION, SOLUTION SUBCUTANEOUS at 17:55

## 2022-06-23 RX ADMIN — Medication 6: at 07:59

## 2022-06-23 RX ADMIN — INSULIN HUMAN 10 UNIT(S): 100 INJECTION, SOLUTION SUBCUTANEOUS at 00:37

## 2022-06-23 RX ADMIN — SODIUM CHLORIDE 40 MILLILITER(S): 9 INJECTION INTRAMUSCULAR; INTRAVENOUS; SUBCUTANEOUS at 19:25

## 2022-06-23 RX ADMIN — Medication 81 MILLIGRAM(S): at 09:33

## 2022-06-23 RX ADMIN — HYDROMORPHONE HYDROCHLORIDE 1 MILLIGRAM(S): 2 INJECTION INTRAMUSCULAR; INTRAVENOUS; SUBCUTANEOUS at 13:35

## 2022-06-23 RX ADMIN — Medication 100 GRAM(S): at 00:38

## 2022-06-23 RX ADMIN — Medication 2.5 MILLIGRAM(S): at 12:47

## 2022-06-23 RX ADMIN — HYDROMORPHONE HYDROCHLORIDE 1 MILLIGRAM(S): 2 INJECTION INTRAMUSCULAR; INTRAVENOUS; SUBCUTANEOUS at 02:44

## 2022-06-23 RX ADMIN — Medication 25 MICROGRAM(S): at 06:31

## 2022-06-23 RX ADMIN — SODIUM ZIRCONIUM CYCLOSILICATE 10 GRAM(S): 10 POWDER, FOR SUSPENSION ORAL at 17:55

## 2022-06-23 RX ADMIN — Medication 50 MILLILITER(S): at 00:18

## 2022-06-23 RX ADMIN — HYDROMORPHONE HYDROCHLORIDE 1 MILLIGRAM(S): 2 INJECTION INTRAMUSCULAR; INTRAVENOUS; SUBCUTANEOUS at 01:48

## 2022-06-23 RX ADMIN — HYDROMORPHONE HYDROCHLORIDE 0.25 MILLIGRAM(S): 2 INJECTION INTRAMUSCULAR; INTRAVENOUS; SUBCUTANEOUS at 17:39

## 2022-06-23 RX ADMIN — HYDROMORPHONE HYDROCHLORIDE 1 MILLIGRAM(S): 2 INJECTION INTRAMUSCULAR; INTRAVENOUS; SUBCUTANEOUS at 07:03

## 2022-06-23 RX ADMIN — SODIUM POLYSTYRENE SULFONATE 15 GRAM(S): 4.1 POWDER, FOR SUSPENSION ORAL at 00:38

## 2022-06-23 RX ADMIN — HEPARIN SODIUM 5000 UNIT(S): 5000 INJECTION INTRAVENOUS; SUBCUTANEOUS at 22:49

## 2022-06-23 RX ADMIN — HYDROMORPHONE HYDROCHLORIDE 1 MILLIGRAM(S): 2 INJECTION INTRAMUSCULAR; INTRAVENOUS; SUBCUTANEOUS at 14:05

## 2022-06-23 RX ADMIN — Medication 2.5 MILLIGRAM(S): at 06:31

## 2022-06-23 RX ADMIN — HYDROMORPHONE HYDROCHLORIDE 0.25 MILLIGRAM(S): 2 INJECTION INTRAMUSCULAR; INTRAVENOUS; SUBCUTANEOUS at 18:10

## 2022-06-23 RX ADMIN — CHLORHEXIDINE GLUCONATE 1 APPLICATION(S): 213 SOLUTION TOPICAL at 19:38

## 2022-06-23 RX ADMIN — Medication 8: at 13:24

## 2022-06-23 RX ADMIN — Medication 100 GRAM(S): at 21:01

## 2022-06-23 RX ADMIN — Medication 125 MICROGRAM(S): at 07:59

## 2022-06-23 NOTE — DIETITIAN INITIAL EVALUATION ADULT - ADD RECOMMEND
1. Change diet to CONSCHO Clear Liquid diet with Ensure Max BID (300kcal, 60g pro)    >>Advance diet to CONSCHO and DASH/TLC as appropriate  >>Assist with feeds as appropriate.   >>Monitor %PO Intake and diet tolerance  2. Encourage pt to meet nutritional needs as able   3. Encourage adherence to diet education (reinforce as able)   4. Pain and bowel regimen per team   5. Monitor lytes, replete prn. Monitor BG, trend renal indices.   6. Will continue to assess/honor preferences as able  1. Change diet to CONSCHO Clear Liquid diet; as able/per diet advancement add Ensure Max BID (300kcal, 60g pro)    >>Advance diet to CONSCHO and DASH/TLC as appropriate  >>Assist with feeds as appropriate.   >>Monitor %PO Intake and diet tolerance  2. Encourage pt to meet nutritional needs as able   3. Encourage adherence to diet education (reinforce as able)   4. Pain and bowel regimen per team   5. Monitor lytes, replete prn. Monitor BG, trend renal indices.   6. Will continue to assess/honor preferences as able

## 2022-06-23 NOTE — DIETITIAN INITIAL EVALUATION ADULT - PERTINENT LABORATORY DATA
06-23    134<L>  |  x   |  x   ----------------------------<  x   x    |  x   |  1.45<H>    Ca    8.6      23 Jun 2022 01:27  Phos  5.4     06-22  Mg     2.0     06-22    TPro  x   /  Alb  x   /  TBili  0.8  /  DBili  x   /  AST  x   /  ALT  x   /  AlkPhos  x   06-23  POCT Blood Glucose.: 345 mg/dL (06-23-22 @ 12:40)  A1C with Estimated Average Glucose Result: 12.0 % (06-16-22 @ 05:39)  A1C with Estimated Average Glucose Result: 12.1 % (06-15-22 @ 18:50)  A1C with Estimated Average Glucose Result: 9.0 % (01-06-22 @ 06:24)

## 2022-06-23 NOTE — CHART NOTE - NSCHARTNOTEFT_GEN_A_CORE
Renal consulted for hyperkalaemia and MOISES in 65y/o M post op cholecustoenteric fistula excision and partial cholecystectomy 6/22    Non Oliguric MOISES possibly prerenal vs iATN intraop  Baseline creat ~1  Creat up to 1.5 today  Check urine studies order set    Hyperkalaemia- treat with insulin/D50, lokelma 10g q8h for 24hrs; natriuresis should potentiate K excretion    BP low normal, BUN/creat >30:1  Pt diet just advanced to clears, can give gentle IVF 40cc/hr saline  Continue straight caths per primary team and bladder scans q8h  Get renal/bladder sono    Anaemic- check Fe panel and ferritin    Renal diet, avoid nephrotoxic meds, renally dose meds, strict I&Os

## 2022-06-23 NOTE — DIETITIAN INITIAL EVALUATION ADULT - NSFNSPHYEXAMSKINFT_GEN_A_CORE
Pressure Injury 1: Left:,second toe, Unstageable  Pressure Injury 2: Left:,heel, Unstageable  Pressure Injury 3: Left:,shin, Stage III  Pressure Injury 4: none, none  Pressure Injury 5: none, none  Pressure Injury 6: none, none  Pressure Injury 7: none, none  Pressure Injury 8: none, none  Pressure Injury 9: none, none  Pressure Injury 10: none, none  Pressure Injury 11: none, none, Pressure Injury 1: Left:,second toe, Unstageable  Pressure Injury 2: Left:,heel, Unstageable  Pressure Injury 3: Left:,shin, Stage III  Pressure Injury 4: none, none  Pressure Injury 5: none, none  Pressure Injury 6: none, none  Pressure Injury 7: none, none  Pressure Injury 8: none, none  Pressure Injury 9: none, none  Pressure Injury 10: none, none  Pressure Injury 11: none, none

## 2022-06-23 NOTE — DIETITIAN INITIAL EVALUATION ADULT - OTHER INFO
67 yo M w pmh of CAD (s/p multiple surgeries), CHF (15-20% EF), uncontrolled DM, HLD, HTN and R TKA presented with a history of acute cholecystitis s/p perc sudhir in 2/2020 c/b cholecystocutaneous fistula planned for a takedown and and cholecystectomy 6/22/22. Pt now s/p partial cholecystectomy and excision of fistula tract of abdomen 6/22.    Pt seen at bedside for initial assessment. Denies nausea/vomiting at this time. Reports last bowel movement prior to admission. Confirms NKFA. Pt states his appetite has severely declined over the last eight months or so as he's been in and out of hospital. Pt also reports long history of diet changes and wt changes. He states he usually consumes high-carbohydrate foods. Attempts to make changes for diabetes, though diet recall included pizza, bagels, pasta, and seemingly lacked protein. Verbalizes recent weight loss of 80 pounds in about 8 months, though timeframe was unclear. As per [facility] records, pt weighed 214 lbs January 2022, reflecting a 41lb, 19% wt loss in 5 months, significant. NFPE was unremarkable. As per [facility] Records, pt meets the criteria for severe PCM AEB decreased PO intake and wt loss. Pt with No edema documented at this time. No pressure ulcers documented at this time. Freddie score=. Nutrition focused physical exam not warranted at this time as pt reported no change in appetite, no recent weight loss, and observed pt with no overt signs of muscle or fat wasting. Based on ASPEN guidelines, pt does not meet criteria for malnutrition at this time. Discussed current diet order; provided pt with diet education regarding ; amenable to education. Pt reports feeling hungry during hospital stay, able to complete % of meals. Made aware RD remains available. RD to follow up per protocol. See nutrition recommendations below.    67 yo M w pmh of CAD (s/p multiple surgeries), CHF (15-20% EF), uncontrolled DM, HLD, HTN R TKA, followed by AKA, presented with a history of acute cholecystitis s/p perc sudhir in 2/2020 c/b cholecystocutaneous fistula planned for a takedown and and cholecystectomy 6/22/22. Pt now s/p partial cholecystectomy and excision of fistula tract of abdomen 6/22.    Pt seen at bedside for initial assessment. Denies nausea/vomiting at this time. Reports last bowel movement prior to admission. Confirms NKFA. Pt states his appetite has severely declined over the last eight months or so as he's been in and out of hospital. Pt also reports long history of diet changes and wt changes. He states he usually consumes high-carbohydrate foods. Attempts to make changes for diabetes, though diet recall included pizza, bagels, pasta, and seemingly lacked protein. Verbalizes recent weight loss of 80 pounds in about 8 months, though timeframe was unclear. As per [facility] records, pt had an AKA January 2022 which resulted in an after-surgery weight of 171lbs. Thus, wt changes are insignificant at this time. NFPE was unremarkable. As per ASPEN guidelines, pt does not meet the criteria for PCM at this time. Pt with No edema documented at this time. Pt with multiple pressure injuries: L second toe unstageable, L heel unstageable, L shin stage III.  Freddie score=14. Discussed current diet order Clear Liquid; provided pt with diet education regarding diabetic diet; amenable to education but could benefit from further reinforcement. Pt reports feeling hungry during hospital stay, able to complete 75% of meals. D/w team about changing pt to CONSCHO Clear Liquid Diet. Added Ensure Max BID (300kcal, 60g pro). MD agreeable.  Made aware RD remains available. RD to follow up per protocol. See nutrition recommendations below.  67 yo M w pmh of CAD (s/p multiple surgeries), CHF (15-20% EF), uncontrolled DM, HLD, HTN R TKA, followed by AKA, presented with a history of acute cholecystitis s/p perc sudhir in 2/2020 c/b cholecystocutaneous fistula planned for a takedown and and cholecystectomy 6/22/22. Pt now s/p partial cholecystectomy and excision of fistula tract of abdomen 6/22.    Pt seen at bedside for initial assessment. Denies nausea/vomiting at this time. Reports last bowel movement prior to admission. Confirms NKFA. Pt states his appetite has severely declined over the last eight months or so as he's been in and out of hospital. Pt also reports long history of diet changes and wt changes. He states he usually consumes high-carbohydrate foods. Attempts to make changes for diabetes, though diet recall included pizza, bagels, pasta, and seemingly lacked protein. Verbalizes recent weight loss of 80 pounds in about 8 months, though timeframe was unclear. As per [facility] records, pt had an AKA January 2022 which resulted in an after-surgery weight of 171lbs. Thus, wt changes are insignificant at this time. NFPE was unremarkable. As per ASPEN guidelines, pt does not meet the criteria for PCM at this time. Pt with No edema documented at this time. Pt with multiple pressure injuries: L second toe unstageable, L heel unstageable, L shin stage III.  Freddie score=14. Discussed current diet order Clear Liquid; provided pt with diet education regarding diabetic diet; amenable to education but could benefit from further reinforcement. Pt reports feeling hungry during hospital stay, able to complete 75% of meals. D/w team about changing pt to CONSCHO Clear Liquid Diet. RD discussed w/ team and pt trial of Ensure Max BID (300kcal, 60g pro) once diet advanced; pt and MD agreeable.  Made aware RD remains available. RD to follow up per protocol. See nutrition recommendations below.

## 2022-06-23 NOTE — DIETITIAN INITIAL EVALUATION ADULT - OTHER CALCULATIONS
%IBW: 111; ABW used to calculate estimated needs d/t pt being between 80 and 120% IBW; adjusted for wound healing/Pressure Injuries Stage II and greater, renal labs/clinical judgment.  Fluids per team  %IBW: 107; ABW used to calculate estimated needs d/t pt being between 80 and 120% IBW; adjusted for wound healing/Pressure Injuries Stage II. Adjust protein needs pending improvement in renal function. Fluids per team

## 2022-06-23 NOTE — DIETITIAN INITIAL EVALUATION ADULT - PERTINENT MEDS FT
MEDICATIONS  (STANDING):  aspirin  chewable 81 milliGRAM(s) Oral every 24 hours  chlorhexidine 2% Cloths 1 Application(s) Topical <User Schedule>  dextrose 5%. 1000 milliLiter(s) (50 mL/Hr) IV Continuous <Continuous>  dextrose 5%. 1000 milliLiter(s) (100 mL/Hr) IV Continuous <Continuous>  dextrose 50% Injectable 25 Gram(s) IV Push once  dextrose 50% Injectable 12.5 Gram(s) IV Push once  dextrose 50% Injectable 25 Gram(s) IV Push once  digoxin     Tablet 125 MICROGram(s) Oral every 24 hours  glucagon  Injectable 1 milliGRAM(s) IntraMuscular once  heparin   Injectable 5000 Unit(s) SubCutaneous every 8 hours  insulin lispro (ADMELOG) corrective regimen sliding scale   SubCutaneous every 6 hours  levothyroxine 25 MICROGram(s) Oral daily  metoprolol tartrate Injectable 2.5 milliGRAM(s) IV Push every 6 hours  pantoprazole  Injectable 40 milliGRAM(s) IV Push every 24 hours    MEDICATIONS  (PRN):  dextrose Oral Gel 15 Gram(s) Oral once PRN Blood Glucose LESS THAN 70 milliGRAM(s)/deciliter  HYDROmorphone  Injectable 0.5 milliGRAM(s) IV Push every 4 hours PRN Moderate Pain (4 - 6)  HYDROmorphone  Injectable 1 milliGRAM(s) IV Push every 6 hours PRN Severe Pain (7 - 10)  ondansetron Injectable 4 milliGRAM(s) IV Push every 6 hours PRN Nausea and/or Vomiting

## 2022-06-23 NOTE — DIETITIAN INITIAL EVALUATION ADULT - NSICDXPASTSURGICALHX_GEN_ALL_CORE_FT
PAST SURGICAL HISTORY:  AICD (automatic cardioverter/defibrillator) present [facility]    Cholecystostomy care drain inserted 2020 & removed 4 months ago    Elective surgery plastic surgery Left shin    H/O shoulder surgery right    History of hip replacement, total, right     History of open reduction and internal fixation (ORIF) procedure right hip    History of tonsillectomy     Other postprocedural status Fixation hardware in foot LEFT    S/P CABG x 1 2018    S/P TKR (total knee replacement), right with infection Mrsa   per pt he was cleared from MRSA infection    Stented coronary artery 10/18 heart attack  INFERIOR WALL MI    Surgery, elective right knee wound debridement

## 2022-06-23 NOTE — PROGRESS NOTE ADULT - SUBJECTIVE AND OBJECTIVE BOX
Procedure: Fistula tract excision, lap partial sudhir  Surgeon: Fernando     S: Pt has no complaints. Denies CP, SOB, JUNG, calf tenderness. Pain controlled with medication. Patient denies any nausea or vomiting, reports that he is feeling very sleepy    O:  T(C): --  T(F): --  HR: 89 (06-23-22 @ 01:39) (84 - 91)  BP: 135/69 (06-23-22 @ 00:50) (124/61 - 137/65)  RR: 14 (06-23-22 @ 01:39) (14 - 18)  SpO2: 92% (06-23-22 @ 01:39) (92% - 100%)  Wt(kg): --                        11.1   19.09 )-----------( 336      ( 22 Jun 2022 22:33 )             37.9     06-23    134<L>  |  100  |  37<H>  ----------------------------<  270<H>  4.6   |  20<L>  |  1.30    Ca    8.6      23 Jun 2022 01:27  Phos  5.4     06-22  Mg     2.0     06-22    TPro  7.2  /  Alb  3.9  /  TBili  1.1  /  DBili  x   /  AST  186<H>  /  ALT  166<H>  /  AlkPhos  216<H>  06-22      Gen: NAD, resting comfortably in bed  C/V: NSR  Pulm: Nonlabored breathing, no respiratory distress  Abd: abdomen is soft, nontender, non distended, no rebound or guarding, incisions are clean dry and intact, MIS in place in R abdomen w serosang output   Extrem: WWP, no calf edema, SCDs in place

## 2022-06-23 NOTE — PROGRESS NOTE ADULT - ASSESSMENT
67 yo M w pmh of CAD (s/p multiple surgeries), CHF (15-20% EF), uncontrolled DM, HLD, HTN and R TKA presented with a history of acute cholecystitis s/p perc sudhir in 2/2020 c/b cholecystocutaneous fistula planned for a takedown and cholecystectomy 6/22/22.    pain/nausea control  DVT ppx/ ASA/ SCD/ NO SQH  CLD  SQH for DVT ppx  Post op CXR  Post op EKG   67 yo M w pmh of CAD (s/p multiple surgeries), CHF (15-20% EF), uncontrolled DM, HLD, HTN and R TKA presented with a history of acute cholecystitis s/p perc sudhir in 2/2020 c/b cholecystocutaneous fistula planned for a takedown and cholecystectomy 6/22/22.    pain/nausea control  CLD  TOV 3  DVT ppx/ ASA/ SCD  AM Labs

## 2022-06-23 NOTE — DIETITIAN INITIAL EVALUATION ADULT - NSICDXPASTMEDICALHX_GEN_ALL_CORE_FT
PAST MEDICAL HISTORY:  Anxiety and depression     Atherosclerosis of coronary artery CAD (coronary artery disease)    CHF (congestive heart failure) EF ~ 25%    Diabetes on insulin pump    Diabetic neuropathy Hands and Feet    Diverticulitis     History of celiac disease     HLD (hyperlipidemia)     Osteoarthritis     Status post percutaneous transluminal coronary angioplasty 2 stents    STEMI (ST elevation myocardial infarction)

## 2022-06-23 NOTE — PROGRESS NOTE ADULT - SUBJECTIVE AND OBJECTIVE BOX
STATUS POST:   6/22: lap Excision of fistula tract, partial cholecystectomy    SUBJECTIVE: Patient seen and examined at bedside with chief resident.  This morning he feels well and his pain is well controlled. Patient had no acute events overnight.  He has not started having flatus and bowel movements. We changed his dressing. Denies f/c, n/v, CP, SOB, dysuria, hematuria, weakness or pain in extremities.    aspirin  chewable 81 milliGRAM(s) Oral every 24 hours  digoxin     Tablet 125 MICROGram(s) Oral every 24 hours  heparin   Injectable 5000 Unit(s) SubCutaneous every 8 hours  metoprolol tartrate Injectable 2.5 milliGRAM(s) IV Push every 6 hours      Vital Signs Last 24 Hrs  T(C): 36.7 (23 Jun 2022 03:30), Max: 36.7 (23 Jun 2022 03:30)  T(F): 98.1 (23 Jun 2022 03:30), Max: 98.1 (23 Jun 2022 03:30)  HR: 83 (23 Jun 2022 03:30) (71 - 91)  BP: 117/62 (23 Jun 2022 03:30) (110/67 - 137/65)  BP(mean): 79 (23 Jun 2022 03:02) (79 - 96)  RR: 16 (23 Jun 2022 03:30) (14 - 27)  SpO2: 95% (23 Jun 2022 03:30) (92% - 100%)  I&O's Detail    22 Jun 2022 07:01  -  23 Jun 2022 07:00  --------------------------------------------------------  IN:    Lactated Ringers: 400 mL  Total IN: 400 mL    OUT:    Bulb (mL): 215 mL    Indwelling Catheter - Urethral (mL): 1300 mL  Total OUT: 1515 mL    Total NET: -1115 mL      General: NAD, resting comfortably in bed  C/V: NSR  Pulm: Nonlabored breathing, no respiratory distress  Abd: abdomen is soft, nontender, non distended, no rebound or guarding, incisions are clean dry and intact, MIS in place in R abdomen w serosang output (dressing around MIS is dry)  Extrem: WWP, no edema, SCDs in place      LABS:                        11.1   19.09 )-----------( 336      ( 22 Jun 2022 22:33 )             37.9     06-23    134<L>  |  x   |  x   ----------------------------<  x   x    |  x   |  1.45<H>    Ca    8.6      23 Jun 2022 01:27  Phos  5.4     06-22  Mg     2.0     06-22    TPro  x   /  Alb  x   /  TBili  0.8  /  DBili  x   /  AST  x   /  ALT  x   /  AlkPhos  x   06-23          RADIOLOGY & ADDITIONAL STUDIES:

## 2022-06-24 DIAGNOSIS — E11.9 TYPE 2 DIABETES MELLITUS WITHOUT COMPLICATIONS: ICD-10-CM

## 2022-06-24 LAB
ALBUMIN SERPL ELPH-MCNC: 3.2 G/DL — LOW (ref 3.3–5)
ALBUMIN SERPL ELPH-MCNC: 3.3 G/DL — SIGNIFICANT CHANGE UP (ref 3.3–5)
ALP SERPL-CCNC: 151 U/L — HIGH (ref 40–120)
ALP SERPL-CCNC: 174 U/L — HIGH (ref 40–120)
ALT FLD-CCNC: 143 U/L — HIGH (ref 10–45)
ALT FLD-CCNC: 154 U/L — HIGH (ref 10–45)
ANION GAP SERPL CALC-SCNC: 12 MMOL/L — SIGNIFICANT CHANGE UP (ref 5–17)
ANION GAP SERPL CALC-SCNC: 9 MMOL/L — SIGNIFICANT CHANGE UP (ref 5–17)
AST SERPL-CCNC: 74 U/L — HIGH (ref 10–40)
AST SERPL-CCNC: 90 U/L — HIGH (ref 10–40)
BILIRUB SERPL-MCNC: 0.6 MG/DL — SIGNIFICANT CHANGE UP (ref 0.2–1.2)
BILIRUB SERPL-MCNC: 0.8 MG/DL — SIGNIFICANT CHANGE UP (ref 0.2–1.2)
BUN SERPL-MCNC: 45 MG/DL — HIGH (ref 7–23)
BUN SERPL-MCNC: 50 MG/DL — HIGH (ref 7–23)
CALCIUM SERPL-MCNC: 8.3 MG/DL — LOW (ref 8.4–10.5)
CALCIUM SERPL-MCNC: 8.4 MG/DL — SIGNIFICANT CHANGE UP (ref 8.4–10.5)
CHLORIDE SERPL-SCNC: 101 MMOL/L — SIGNIFICANT CHANGE UP (ref 96–108)
CHLORIDE SERPL-SCNC: 97 MMOL/L — SIGNIFICANT CHANGE UP (ref 96–108)
CO2 SERPL-SCNC: 26 MMOL/L — SIGNIFICANT CHANGE UP (ref 22–31)
CO2 SERPL-SCNC: 26 MMOL/L — SIGNIFICANT CHANGE UP (ref 22–31)
CREAT SERPL-MCNC: 1.46 MG/DL — HIGH (ref 0.5–1.3)
CREAT SERPL-MCNC: 1.74 MG/DL — HIGH (ref 0.5–1.3)
EGFR: 43 ML/MIN/1.73M2 — LOW
EGFR: 53 ML/MIN/1.73M2 — LOW
GLUCOSE BLDC GLUCOMTR-MCNC: 100 MG/DL — HIGH (ref 70–99)
GLUCOSE BLDC GLUCOMTR-MCNC: 139 MG/DL — HIGH (ref 70–99)
GLUCOSE BLDC GLUCOMTR-MCNC: 143 MG/DL — HIGH (ref 70–99)
GLUCOSE BLDC GLUCOMTR-MCNC: 46 MG/DL — CRITICAL LOW (ref 70–99)
GLUCOSE BLDC GLUCOMTR-MCNC: 47 MG/DL — CRITICAL LOW (ref 70–99)
GLUCOSE BLDC GLUCOMTR-MCNC: 87 MG/DL — SIGNIFICANT CHANGE UP (ref 70–99)
GLUCOSE SERPL-MCNC: 176 MG/DL — HIGH (ref 70–99)
GLUCOSE SERPL-MCNC: 60 MG/DL — LOW (ref 70–99)
HCT VFR BLD CALC: 37.1 % — LOW (ref 39–50)
HGB BLD-MCNC: 10.8 G/DL — LOW (ref 13–17)
MAGNESIUM SERPL-MCNC: 2.2 MG/DL — SIGNIFICANT CHANGE UP (ref 1.6–2.6)
MAGNESIUM SERPL-MCNC: 2.2 MG/DL — SIGNIFICANT CHANGE UP (ref 1.6–2.6)
MCHC RBC-ENTMCNC: 22.1 PG — LOW (ref 27–34)
MCHC RBC-ENTMCNC: 29.1 GM/DL — LOW (ref 32–36)
MCV RBC AUTO: 75.9 FL — LOW (ref 80–100)
NRBC # BLD: 0 /100 WBCS — SIGNIFICANT CHANGE UP (ref 0–0)
PHOSPHATE SERPL-MCNC: 4.4 MG/DL — SIGNIFICANT CHANGE UP (ref 2.5–4.5)
PHOSPHATE SERPL-MCNC: 5.2 MG/DL — HIGH (ref 2.5–4.5)
PLATELET # BLD AUTO: 307 K/UL — SIGNIFICANT CHANGE UP (ref 150–400)
POTASSIUM SERPL-MCNC: 4.4 MMOL/L — SIGNIFICANT CHANGE UP (ref 3.5–5.3)
POTASSIUM SERPL-MCNC: 5 MMOL/L — SIGNIFICANT CHANGE UP (ref 3.5–5.3)
POTASSIUM SERPL-SCNC: 4.4 MMOL/L — SIGNIFICANT CHANGE UP (ref 3.5–5.3)
POTASSIUM SERPL-SCNC: 5 MMOL/L — SIGNIFICANT CHANGE UP (ref 3.5–5.3)
PROT SERPL-MCNC: 6.1 G/DL — SIGNIFICANT CHANGE UP (ref 6–8.3)
PROT SERPL-MCNC: 6.5 G/DL — SIGNIFICANT CHANGE UP (ref 6–8.3)
RBC # BLD: 4.89 M/UL — SIGNIFICANT CHANGE UP (ref 4.2–5.8)
RBC # FLD: 19 % — HIGH (ref 10.3–14.5)
SODIUM SERPL-SCNC: 135 MMOL/L — SIGNIFICANT CHANGE UP (ref 135–145)
SODIUM SERPL-SCNC: 136 MMOL/L — SIGNIFICANT CHANGE UP (ref 135–145)
WBC # BLD: 16.48 K/UL — HIGH (ref 3.8–10.5)
WBC # FLD AUTO: 16.48 K/UL — HIGH (ref 3.8–10.5)

## 2022-06-24 PROCEDURE — 99223 1ST HOSP IP/OBS HIGH 75: CPT

## 2022-06-24 RX ORDER — HYDROMORPHONE HYDROCHLORIDE 2 MG/ML
0.25 INJECTION INTRAMUSCULAR; INTRAVENOUS; SUBCUTANEOUS ONCE
Refills: 0 | Status: DISCONTINUED | OUTPATIENT
Start: 2022-06-24 | End: 2022-06-24

## 2022-06-24 RX ORDER — CALCIUM GLUCONATE 100 MG/ML
1 VIAL (ML) INTRAVENOUS ONCE
Refills: 0 | Status: DISCONTINUED | OUTPATIENT
Start: 2022-06-24 | End: 2022-06-24

## 2022-06-24 RX ORDER — OXYCODONE HYDROCHLORIDE 5 MG/1
5 TABLET ORAL EVERY 4 HOURS
Refills: 0 | Status: DISCONTINUED | OUTPATIENT
Start: 2022-06-24 | End: 2022-06-28

## 2022-06-24 RX ORDER — INSULIN LISPRO 100/ML
1 VIAL (ML) SUBCUTANEOUS
Refills: 0 | Status: DISCONTINUED | OUTPATIENT
Start: 2022-06-24 | End: 2022-06-26

## 2022-06-24 RX ORDER — HYDROMORPHONE HYDROCHLORIDE 2 MG/ML
0.5 INJECTION INTRAMUSCULAR; INTRAVENOUS; SUBCUTANEOUS ONCE
Refills: 0 | Status: DISCONTINUED | OUTPATIENT
Start: 2022-06-24 | End: 2022-06-24

## 2022-06-24 RX ORDER — ACETAMINOPHEN 500 MG
650 TABLET ORAL EVERY 6 HOURS
Refills: 0 | Status: DISCONTINUED | OUTPATIENT
Start: 2022-06-24 | End: 2022-06-28

## 2022-06-24 RX ADMIN — HYDROMORPHONE HYDROCHLORIDE 0.5 MILLIGRAM(S): 2 INJECTION INTRAMUSCULAR; INTRAVENOUS; SUBCUTANEOUS at 14:55

## 2022-06-24 RX ADMIN — HYDROMORPHONE HYDROCHLORIDE 0.5 MILLIGRAM(S): 2 INJECTION INTRAMUSCULAR; INTRAVENOUS; SUBCUTANEOUS at 09:36

## 2022-06-24 RX ADMIN — Medication 2.5 MILLIGRAM(S): at 06:30

## 2022-06-24 RX ADMIN — Medication 2.5 MILLIGRAM(S): at 23:06

## 2022-06-24 RX ADMIN — HYDROMORPHONE HYDROCHLORIDE 1 MILLIGRAM(S): 2 INJECTION INTRAMUSCULAR; INTRAVENOUS; SUBCUTANEOUS at 06:19

## 2022-06-24 RX ADMIN — HYDROMORPHONE HYDROCHLORIDE 1 MILLIGRAM(S): 2 INJECTION INTRAMUSCULAR; INTRAVENOUS; SUBCUTANEOUS at 00:22

## 2022-06-24 RX ADMIN — HEPARIN SODIUM 5000 UNIT(S): 5000 INJECTION INTRAVENOUS; SUBCUTANEOUS at 14:32

## 2022-06-24 RX ADMIN — HEPARIN SODIUM 5000 UNIT(S): 5000 INJECTION INTRAVENOUS; SUBCUTANEOUS at 23:05

## 2022-06-24 RX ADMIN — Medication 81 MILLIGRAM(S): at 09:35

## 2022-06-24 RX ADMIN — HYDROMORPHONE HYDROCHLORIDE 1 MILLIGRAM(S): 2 INJECTION INTRAMUSCULAR; INTRAVENOUS; SUBCUTANEOUS at 00:45

## 2022-06-24 RX ADMIN — CHLORHEXIDINE GLUCONATE 1 APPLICATION(S): 213 SOLUTION TOPICAL at 06:20

## 2022-06-24 RX ADMIN — HYDROMORPHONE HYDROCHLORIDE 0.5 MILLIGRAM(S): 2 INJECTION INTRAMUSCULAR; INTRAVENOUS; SUBCUTANEOUS at 10:25

## 2022-06-24 RX ADMIN — Medication 2.5 MILLIGRAM(S): at 19:32

## 2022-06-24 RX ADMIN — OXYCODONE HYDROCHLORIDE 5 MILLIGRAM(S): 5 TABLET ORAL at 20:36

## 2022-06-24 RX ADMIN — HEPARIN SODIUM 5000 UNIT(S): 5000 INJECTION INTRAVENOUS; SUBCUTANEOUS at 06:20

## 2022-06-24 RX ADMIN — HYDROMORPHONE HYDROCHLORIDE 1 MILLIGRAM(S): 2 INJECTION INTRAMUSCULAR; INTRAVENOUS; SUBCUTANEOUS at 06:45

## 2022-06-24 RX ADMIN — OXYCODONE HYDROCHLORIDE 5 MILLIGRAM(S): 5 TABLET ORAL at 21:36

## 2022-06-24 RX ADMIN — HYDROMORPHONE HYDROCHLORIDE 0.5 MILLIGRAM(S): 2 INJECTION INTRAMUSCULAR; INTRAVENOUS; SUBCUTANEOUS at 14:31

## 2022-06-24 RX ADMIN — Medication 125 MICROGRAM(S): at 11:00

## 2022-06-24 RX ADMIN — Medication 25 MICROGRAM(S): at 06:30

## 2022-06-24 RX ADMIN — PANTOPRAZOLE SODIUM 40 MILLIGRAM(S): 20 TABLET, DELAYED RELEASE ORAL at 23:05

## 2022-06-24 NOTE — PROGRESS NOTE ADULT - ASSESSMENT
65 yo M w pmh of CAD (s/p multiple surgeries), CHF (15-20% EF), uncontrolled DM, HLD, HTN and R TKA presented with a history of acute cholecystitis s/p perc sudhir in 2/2020 c/b cholecystocutaneous fistula planned for a takedown and cholecystectomy, pt is now s/p lap Excision of fistula tract, partial cholecystectomy on 6/22.    Pain/nausea control  CLD CC w/ ensures max 1 BID, NS @ 40  HSQ/ASA, SCDs  AM Labs  F/u renal recs for MOISES/hyperK; monitor UOP  F/u endo recs for hyperglycemia   F/u PT

## 2022-06-24 NOTE — CONSULT NOTE ADULT - ASSESSMENT
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 King in 04/08/2021, presenting for elective resection of fistula tract. Now s/p takedown and cholecystectomy 6/22/22. Also with hyperglycemia.    A1c:12%  Wt:79kg BMI 23  Cr: MOISES 1.71  home regimen: insulin pump   
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 King in 04/08/2021, presenting for elective resection of fistula tract. Now s/p takedown and cholecystectomy 6/22/22. Endocrine consulted for insulin pump management.    Type 2 DM  A1C 12%  Wt: 79kg BMI 23  Cr: MOISES 1.71    [employer]tronic 770g and Freestyle Cathleen  Settings at admission.  Basal - 3 units/hr.  ICR - 12M 4, 12P 3.5, 9P 6  ISF 25  Target 100  IOB 4
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 King in 04/08/2021, presenting for elective resection of fistula tract. Now s/p takedown and cholecystectomy 6/22/22. Renal consulted for MOISES w/ Cr 1.5, (baseline 1) and hyperkalemia.      #MOISES - Patient s/p surgery found to have non Oliguric MOISES possibly prerenal vs w/ BP in low normal range and BUN/Cr >30:1. Also obstructive MOISES w/ urinary retention noted on US and hyperkalemia 2/2 decreased Na delivery to distal nephron. Cr. 1.5 --> 1.64 --> 1.74.     - Recommend placing Loja   - Close monitoring of I/Os  - Encouraged PO Intake   - Can c/w IV Hydration   - Trend BUN/Cr     #Hyperkalemia - K+ 5.9 treated with insulin/D50, lokelma 10g q8h for 24hrs. Likely 2/2 to MOISES 2/2 urinary retention. Natriuresis will help with hyperkalemia.     - Place Loja cath

## 2022-06-24 NOTE — CONSULT NOTE ADULT - PROBLEM SELECTOR RECOMMENDATION 9
Type 2 on insulin pump and CGM  Suspect his basal rate was increased for lack of meal bolusing.  Became hypoglycemia today on CC clear diet.    Can continue insulin pump, but decrease basal rate to 2.0u/h, which I changed for him.    If he is unable to manage insulin pump, would consider Lantus 20 units at bedtime and MISS before meals and at bedtime.

## 2022-06-24 NOTE — CONSULT NOTE ADULT - SUBJECTIVE AND OBJECTIVE BOX
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 Since admission here, pt was consulted by ID and vascular, taken to OR with vascular on 1/10 for right AKA. Bld cx with MRSA, switched from vanco  to daptomycin, had JOHN PAUL with no vegetation, gallium negative.  Patient is now admitted for removal of fistula from gallbladder.  He is sedentary but denies chest pain or dyspnea.  He is attempting to arrange follow-up for his defibrillator.  He has not had palpitations or dizziness.    Chart, PMH, medication and allergies reviewed  PAST MEDICAL & SURGICAL HISTORY:  Status post percutaneous transluminal coronary angioplasty  2 stents      Atherosclerosis of coronary artery  CAD (coronary artery disease)      Osteoarthritis      HLD (hyperlipidemia)      Diabetes  on insulin pump      CHF (congestive heart failure)  EF ~ 25%      History of celiac disease      Diverticulitis      STEMI (ST elevation myocardial infarction)      Diabetic neuropathy  Hands and Feet      Anxiety and depression      Other postprocedural status  Fixation hardware in foot LEFT      Stented coronary artery  10/18 INFERIOR WALL MI      S/P CABG x 1  2018      S/P TKR (total knee replacement), right  with infection Mrsa    Surgery, elective  right knee wound debridement      History of open reduction and internal fixation (ORIF) procedure  right hip      H/O shoulder surgery  right      AICD (automatic cardioverter/defibrillator) present  [employer]      Cholecystostomy care  drain inserted 2020 &amp; removed 4 months ago      History of tonsillectomy      History of hip replacement, total, right      Elective surgery  plastic surgery Left shin          [x  ] Diabetes   [ x ] Hypertension  [ x ] Hyperlipidemia  [ x ] CAD  [x  ] PCI  [x  ] CABG    PREVIOUS DIAGNOSTIC TESTING:    [ x ] Echocardiogram:  [  ]  Catheterization:  [  ] Stress Test:  	    MEDICATIONS:  Multiple Vitamins oral tablet: Last Dose Taken: 21-Jun-2022 AM, 1 tab(s) orally once a day  · 	levothyroxine 25 mcg (0.025 mg) oral tablet: Last Dose Taken: 21-Jun-2022 AM, 1 tab(s) orally once a day  · 	pantoprazole 40 mg oral delayed release tablet: Last Dose Taken: 21-Jun-2022 AM, 1 tab(s) orally once a day (before a meal)  · 	metoprolol succinate 25 mg oral tablet, extended release: Last Dose Taken: 21-Jun-2022 AM, 1 tab(s) orally once a day  · 	aspirin 81 mg oral delayed release tablet: Last Dose Taken: 21-Jun-2022 AM, 1 tab(s) orally once a day  · 	prasugrel 10 mg oral tablet: Last Dose Taken: 14-Jun-2022 , 1 tab(s) orally once a day  · 	rosuvastatin 40 mg oral tablet: Last Dose Taken: 21-Jun-2022 PM, 1 tab(s) orally once a day  · 	digoxin 125 mcg (0.125 mg) oral tablet: Last Dose Taken: 21-Jun-2022 AM, 1 tab(s) orally once a day  · 	DULoxetine: Last Dose Taken: 21-Jun-2022 AM, 90 milligram(s) orally once a day  · 	Lasix 40 mg oral tablet: Last Dose Taken: May, 2022 , 1 tab(s) orally once a day, As Needed  · 	ondansetron 4 mg oral tablet: Last Dose Taken: 22-Jun-2022 AM, 1/2 tab(s) orally every 8 hours, As Needed for nausea    FAMILY HISTORY:  Family history of heart disease (Mother)    Family history of allergies  daughter        SOCIAL HISTORY:    [x  ] Never smoker  [  ] Non-smoker  [  ] Smoker  [  ] Social alcohol use  [  ] Former smoker    Allergies    ACE inhibitors (Rash)  Entresto (Swelling)    Intolerances    	    REVIEW OF SYSTEMS:  CONSTITUTIONAL: No fever, weight loss, or fatigue  EYES: No eye pain, visual disturbances, or discharge  ENT:  No difficulty hearing, tinnitus, vertigo; No sinus or throat pain  NECK: No pain or stiffness  PULMONARY: No cough, no wheezing, chills or hemoptysis; No Shortness of Breath  CARDIOVASCULAR: No chest pain, no  palpitations, no passing out, dizziness, no leg swelling  GASTROINTESTINAL: No abdominal  pain. No nausea, vomiting, or hematemesis; No diarrhea or constipation. No melena or hematochezia.  GENITOURINARY: No dysuria, frequency, hematuria, or incontinence  NEUROLOGICAL: No headaches, memory loss, loss of strength, numbness, or tremors  SKIN: No itching, burning, rashes, or lesions   LYMPH Nodes: No enlarged glands  ENDOCRINE: No heat or cold intolerance; No hair loss  MUSCULOSKELETAL: No joint pain or swelling; No muscle, back, or extremity pain  PSYCHIATRIC: No depression, anxiety, mood swings, or difficulty sleeping  HEME/LYMPH: No easy bruising, or bleeding gums  ALLERGY AND IMMUNOLOGIC: No hives or eczema	    PHYSICAL EXAM:  T(C): 36.7 (06-23-22 @ 03:30), Max: 36.7 (06-23-22 @ 03:30)  HR: 83 (06-23-22 @ 03:30) (71 - 91)  BP: 117/62 (06-23-22 @ 03:30) (110/67 - 137/65)  RR: 16 (06-23-22 @ 03:30) (14 - 27)  SpO2: 95% (06-23-22 @ 03:30) (92% - 100%)  Wt(kg): --  I&O's Summary    22 Jun 2022 07:01  -  23 Jun 2022 07:00  --------------------------------------------------------  IN: 150 mL / OUT: 1515 mL / NET: -1365 mL        Appearance: Normal	  HEENT:   Normal oral mucosa, PERRL, EOMI	  Lymphatic: No lymphadenopathy  Cardiovascular: Normal S1 S2, No JVD, No murmur, No edema  Pulmonary: Lungs clear to auscultation	  Psychiatry: A & O x 3, Mood & affect appropriate  Gastrointestinal:  Soft, Non-tender, + BS	  Skin: No rashes, No ecchymoses, No cyanosis	  Neurologic: Non-focal  Extremities: Normal range of motion, No clubbing or cyanosis. R AKA  	 	  CARDIAC MARKERS:                                11.1   19.09 )-----------( 336      ( 22 Jun 2022 22:33 )             37.9     06-23    134<L>  |  100  |  37<H>  ----------------------------<  270<H>  4.6   |  20<L>  |  1.30    Ca    8.6      23 Jun 2022 01:27  Phos  5.4     06-22  Mg     2.0     06-22    TPro  7.2  /  Alb  3.9  /  TBili  1.1  /  DBili  x   /  AST  186<H>  /  ALT  166<H>  /  AlkPhos  216<H>  06-22    proBNP:  Lipid Profile:  HgA1c:  TSH:    TELEMETRY: 	nsr    ECG:  atrial rhythm a sense v pace	QTC  RADIOLOGY:	    ASSESSMENT/PLAN: 	  Congestive heart failure–patient has severely reduced ejection fraction.  He is sedentary but has no dyspnea.  Postoperatively he is comfortable and his chest is clear.  He has been intolerant of ACE or ARB.  Continue digoxin.    Coronary artery disease–patient is pain-free.  He has been revascularized.  Continue aspirin.  Restart Effient when surgically stable.    Diabetes–insulin coverage.    Hyperkalemia–follow chemistries.    Anemia–stable.    ICD–patient will need follow-up as an outpatient.    Fistula–doing well postop.      Discussed with nursing staff.            
NEPHROLOGY CONSULTATION NOTE    66M w/ PMHx of CAD s/p MIDCAB/VERONICA 2018, AICD (2/2020), CHF (EF 15-20%) uncontrolled DM, HLD, HTN, R TKA several years ago c/b recurrent PJI and MRSA bacteremia and multiple revisions AKA 1/2022 and history of acute cholecystitis s/p perc sudhir 2/28/20 (drain removed in late May 2020) c/b by cholecystocutaneous excision of cholecystocutaneous fistulous tract with Dr. King in 04/08/2021, presenting for elective resection of fistula tract. Now s/p takedown and cholecystectomy 6/22/22. Renal consulted for MOISES w/ Cr 1.5, (baseline 1) and hyperkalemia.     PAST MEDICAL & SURGICAL HISTORY:  Status post percutaneous transluminal coronary angioplasty  2 stents      Atherosclerosis of coronary artery  CAD (coronary artery disease)      Osteoarthritis      HLD (hyperlipidemia)      Diabetes  on insulin pump      CHF (congestive heart failure)  EF ~ 25%      History of celiac disease      Diverticulitis      STEMI (ST elevation myocardial infarction)      Diabetic neuropathy  Hands and Feet      Anxiety and depression      Other postprocedural status  Fixation hardware in foot LEFT      Stented coronary artery  10/18 heart attack  INFERIOR WALL MI      S/P CABG x 1  2018      S/P TKR (total knee replacement), right  with infection Mrsa   per pt he was cleared from MRSA infection      Surgery, elective  right knee wound debridement      History of open reduction and internal fixation (ORIF) procedure  right hip      H/O shoulder surgery  right      AICD (automatic cardioverter/defibrillator) present  [employer]      Cholecystostomy care  drain inserted 2020 &amp; removed 4 months ago      History of tonsillectomy      History of hip replacement, total, right      Elective surgery  plastic surgery Left shin        Allergies:  ACE inhibitors (Rash)  Entresto (Swelling)    Home Medications Reviewed  Hospital Medications:   MEDICATIONS  (STANDING):  aspirin  chewable 81 milliGRAM(s) Oral every 24 hours  chlorhexidine 2% Cloths 1 Application(s) Topical <User Schedule>  dextrose 5%. 1000 milliLiter(s) (50 mL/Hr) IV Continuous <Continuous>  dextrose 5%. 1000 milliLiter(s) (100 mL/Hr) IV Continuous <Continuous>  dextrose 50% Injectable 25 Gram(s) IV Push once  dextrose 50% Injectable 12.5 Gram(s) IV Push once  dextrose 50% Injectable 25 Gram(s) IV Push once  digoxin     Tablet 125 MICROGram(s) Oral every 24 hours  glucagon  Injectable 1 milliGRAM(s) IntraMuscular once  heparin   Injectable 5000 Unit(s) SubCutaneous every 8 hours  levothyroxine 25 MICROGram(s) Oral daily  metoprolol tartrate Injectable 2.5 milliGRAM(s) IV Push every 6 hours  pantoprazole  Injectable 40 milliGRAM(s) IV Push every 24 hours  sodium chloride 0.9%. 1000 milliLiter(s) (40 mL/Hr) IV Continuous <Continuous>  sodium zirconium cyclosilicate 10 Gram(s) Oral every 8 hours      SOCIAL HISTORY:  Denies ETOH,Smoking,   FAMILY HISTORY:  Family history of heart disease (Mother)    Family history of allergies  daughter      REVIEW OF SYSTEMS:  CONSTITUTIONAL: No weakness, fevers or chills  EYES/ENT: No visual changes;  No vertigo or throat pain   NECK: No pain or stiffness  RESPIRATORY: No cough, wheezing, hemoptysis; No shortness of breath  CARDIOVASCULAR: No chest pain or palpitations.  GASTROINTESTINAL: No abdominal or epigastric pain. No nausea, vomiting, or hematemesis; No diarrhea or constipation. No melena or hematochezia.  GENITOURINARY: No dysuria, frequency, foamy urine, urinary urgency, incontinence or hematuria  NEUROLOGICAL: No numbness or weakness  SKIN: No itching, burning, rashes, or lesions   VASCULAR: No bilateral lower extremity edema.   All other review of systems is negative unless indicated above.    VITALS:  T(F): 97.8 (06-24-22 @ 09:34), Max: 97.8 (06-24-22 @ 09:34)  HR: 78 (06-24-22 @ 09:34)  BP: 116/74 (06-24-22 @ 09:34)  RR: 18 (06-24-22 @ 09:34)  SpO2: 99% (06-24-22 @ 09:34)    06-23 @ 07:01  -  06-24 @ 07:00  --------------------------------------------------------  IN: 920 mL / OUT: 620 mL / NET: 300 mL          06-23-22 @ 07:01  -  06-24-22 @ 07:00  --------------------------------------------------------  IN: 0 mL / OUT: 400 mL / NET: -400 mL      I&O's Detail    23 Jun 2022 07:01  -  24 Jun 2022 07:00  --------------------------------------------------------  IN:    Oral Fluid: 840 mL    sodium chloride 0.9%: 80 mL  Total IN: 920 mL    OUT:    Bulb (mL): 220 mL    Intermittent Catheterization - Urethral (mL): 400 mL  Total OUT: 620 mL    Total NET: 300 mL        PHYSICAL EXAM:  Constitutional: NAD  HEENT: anicteric sclera, oropharynx clear, MMM  Neck: No JVD  Respiratory: CTAB, no wheezes, rales or rhonchi  Cardiovascular: S1, S2, RRR  Gastrointestinal: BS+, soft, NT/ND  Extremities: No cyanosis or clubbing. No peripheral edema. R AKA   Neurological: A/O x 3, no focal deficits  Psychiatric: Normal mood, normal affect  : No CVA tenderness. No fields.   Skin: No rashes    LABS:  06-24    135  |  97  |  50<H>  ----------------------------<  176<H>  5.0   |  26  |  1.74<H>    Ca    8.4      24 Jun 2022 06:30  Phos  5.2     06-24  Mg     2.2     06-24    TPro  6.5  /  Alb  3.3  /  TBili  0.8  /  DBili      /  AST  90<H>  /  ALT  154<H>  /  AlkPhos  174<H>  06-24    Creatinine Trend: 1.74 <--, 1.64 <--, 1.51 <--, 1.45 <--, 1.30 <--, 1.31 <--, 0.97 <--, 1.04 <--                        10.8   16.48 )-----------( 307      ( 24 Jun 2022 06:30 )             37.1     Urine Studies:  Urinalysis Basic - ( 23 Jun 2022 20:19 )    Color: Orange / Appearance: Clear / SG: >=1.030 / pH:   Gluc:  / Ketone: NEGATIVE  / Bili: Negative / Urobili: 0.2 E.U./dL   Blood:  / Protein: 30 mg/dL / Nitrite: NEGATIVE   Leuk Esterase: Trace / RBC: < 5 /HPF / WBC Many /HPF   Sq Epi:  / Non Sq Epi: 0-5 /HPF / Bacteria: Present /HPF      Sodium, Random Urine: 20 mmol/L (06-23 @ 20:19)  Creatinine, Random Urine: 97 mg/dL (06-23 @ 20:19)  Protein/Creatinine Ratio Calculation: 0.7 Ratio (06-23 @ 20:19)  Osmolality, Random Urine: 502 mosm/kg (06-23 @ 20:19)  Potassium, Random Urine: 67 mmol/L (06-23 @ 20:19)            RADIOLOGY & ADDITIONAL STUDIES:  POCUS: Bladder US showing approx.  900cc-1L in bladder.                 
HPI:   66M PMH of CAD s/p MIDCAB/VERONICA 2018, AICD (2/2020), CHF (EF 15-20%) uncontrolled DM, HLD, HTN, R TKA several years ago c/b recurrent PJI and multiple revisions  in 09/2020 by Dr. Castro (explant and abx spacer exchange), and most recently transferred to [facility] on 1/6 from [facility] for MRSA bacteremia due to recurrent PJI s/p Revision gastroc flap by PRS, antibiotic cement spacer removal, I&D, replacement on 1/6, and subsequent OR with vascular on 1/10 for right AKA na dC;losure of MOISES on 1/14 ; and history of acute cholecystitis s/p perc sudhir 2/28/20 (drain removed in late May 2020) c/b by cholecystocutaneous excision of cholecystocutaneous fistulous tract with Dr. King in 04/08/2021, presenting for elective resection of fistula tract.   Now s/p takedown and cholecystectomy 6/22/22.    Endocrinology consulted for optimization of diabetes management.  Previous patient of Dr Cormier and now sees Dr Schmitt in [place]    Diagnosis >30 years  Current Regimen: insulin Humalog in pump  Previous regimens: was on po meds but taken off , has been on insulin pump for 5-6 years now  Compliance: ok , NOT using bolus wizard  SMBG/CGM : has Freestyle storm 14 days  Hypoglycemia:no more  Polyuria/polydipsia : no  Weight change/BMI: yes lost  Diet: breakfast dinner , occasional lunch  Exercise: very limited due to patient recent medical problems with knee replacement and hip fracture , now in a wheelchair  HBa1c trend: not available     Patient known to endocrine service from prior admissions.  Last telehealth May 2022:  Patient currently on [employer] [id number] insulin pump. Patient reports Carb:insulin ratio 1:9, basal 2 units, TDD 40 units. Patient does not utilize bolus wizard.    Current Meds:  acetaminophen     Tablet .. 650 milliGRAM(s) Oral every 6 hours PRN  atorvastatin 80 milliGRAM(s) Oral at bedtime  dextrose 5%. 1000 milliLiter(s) IV Continuous <Continuous>  dextrose 5%. 1000 milliLiter(s) IV Continuous <Continuous>  dextrose 50% Injectable 25 Gram(s) IV Push once  dextrose 50% Injectable 12.5 Gram(s) IV Push once  dextrose 50% Injectable 25 Gram(s) IV Push once  dextrose Oral Gel 15 Gram(s) Oral once PRN  digoxin     Tablet 125 MICROGram(s) Oral every 24 hours  glucagon  Injectable 1 milliGRAM(s) IntraMuscular once  heparin   Injectable 5000 Unit(s) SubCutaneous every 8 hours  insulin glargine Injectable (LANTUS) 14 Unit(s) SubCutaneous at bedtime  insulin lispro (ADMELOG) corrective regimen sliding scale   SubCutaneous Before meals and at bedtime  levothyroxine 25 MICROGram(s) Oral daily  metoprolol tartrate Injectable 2.5 milliGRAM(s) IV Push every 6 hours  ondansetron Injectable 4 milliGRAM(s) IV Push every 6 hours PRN  pantoprazole    Tablet 40 milliGRAM(s) Oral before breakfast      Allergies:  ACE inhibitors (Hives)  carvedilol (Other)  enalapril (Hives)  Entresto (Other)      ROS:  Denies the following except as indicated.    General: weight loss/weight gain, decreased appetite, fatigue  Eyes: Blurry vision, double vision, visual changes  ENT: Throat pain, changes in voice,   CV: palpitations, SOB, CP, cough  GI: NVD, difficulty swallowing, abdominal pain  : polyuria, dysuria  Endo: abnormal menses, temperature intolerance, decreased libido  MSK: weakness, joint pain  Skin: rash, dryness, diaphoresis  Heme: Easy bruising,bleeding  Neuro: HA, dizziness, lightheadedness, numbness tingling  Psych: Anxiety, Depression    Vital Signs Last 24 Hrs  T(C): 36.9 (16 Jun 2022 13:12), Max: 37 (16 Jun 2022 09:02)  T(F): 98.4 (16 Jun 2022 13:12), Max: 98.6 (16 Jun 2022 09:02)  HR: 84 (16 Jun 2022 13:00) (82 - 94)  BP: 104/52 (16 Jun 2022 13:00) (104/52 - 131/71)  BP(mean): 75 (16 Jun 2022 13:00) (75 - 94)  RR: 18 (16 Jun 2022 13:00) (16 - 18)  SpO2: 95% (16 Jun 2022 13:00) (95% - 100%)  Height (cm): 185.4 (06-15 @ 17:06)  Weight (kg): 77.1 (06-15 @ 17:06)  BMI (kg/m2): 22.4 (06-15 @ 17:06)      Physical Exam: GENERAL: NAD, Resting comfortably in bed  HEENT: NCAT, MMM, Normal conjunctiva, PERRL  RESP: Nonlabored breathing, No respiratory distress  CARD: Normal rate, Normal peripheral perfusion  GI: Soft, ND, NT, No guarding, No rebound tenderness; RUQ fistula with minimal yellow pus drainage over dressing; no surrounding erethyma, fluctance, tenderness, or skin break down; prior surgical scars   EXTREM: WWP, No edema, R AKA, left ankle wound in dressing   NEURO: AAOx3, No focal motor or sensory deficits    LABS:                        10.9   9.08  )-----------( 307      ( 16 Jun 2022 05:39 )             36.8     06-16    137  |  104  |  27<H>  ----------------------------<  106<H>  4.2   |  23  |  0.97    Ca    8.8      16 Jun 2022 05:39  Phos  3.9     06-16  Mg     2.0     06-16    TPro  6.7  /  Alb  3.5  /  TBili  0.8  /  DBili  x   /  AST  22  /  ALT  22  /  AlkPhos  123<H>  06-16    PT/INR - ( 15 Nael 2022 18:50 )   PT: 15.0 sec;   INR: 1.26          PTT - ( 15 Nael 2022 18:50 )  PTT:26.7 sec      Thyroid Stimulating Hormone, Serum: 2.850 (01-08 @ 08:25)      RADIOLOGY & ADDITIONAL STUDIES:  CAPILLARY BLOOD GLUCOSE      POCT Blood Glucose.: 181 mg/dL (16 Jun 2022 12:54)  POCT Blood Glucose.: 124 mg/dL (16 Jun 2022 06:52)  POCT Blood Glucose.: 232 mg/dL (15 Nael 2022 22:00)  
HPI:   66M PMH of CAD s/p MIDCAB/VERONICA 2018, AICD (2/2020), CHF (EF 15-20%) uncontrolled DM, HLD, HTN, R TKA several years ago c/b recurrent PJI and multiple revisions  in 09/2020 by Dr. Castro (explant and abx spacer exchange), and most recently transferred to [facility] on 1/6 from 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 King in 04/08/2021, presenting for elective resection of fistula tract.   Now s/p takedown and cholecystectomy 6/22/22.    Endocrinology consulted for optimization of diabetes management.  Yesterday FSG in 300s due to lack of basal when pump was removed for procedure.  Pump restated yesterday evening. He has not been bolusing.  This morning   11AM   1PM FSG 87.    Patient known to endocrine service from prior admissions. Previously basal has been 2 units/hr.  Last telehealth May 2022  Current [employer] settings:  Basal - 3 units/hr.  ICR - 12M 4, 12P 3.5, 9P 6  ISF 25  Target 100  IOB 4    Freestyle Cathleen:  30 Days: TIR 47% Low 1%. Bar graph - 3A - 9A - 180s, rest of day 200-250    Diagnosis >30 years  Current Regimen: insulin Humalog in pump  Previous regimens: was on po meds but taken off , has been on insulin pump for 5-6 years now  Compliance: ok , NOT using bolus wizard  SMBG/CGM : has Freestyle cathleen 14 days  Hypoglycemia:no more  Polyuria/polydipsia : no  Weight change/BMI: yes lost  Diet: breakfast dinner , occasional lunch  Exercise: very limited due to patient recent medical problems with knee replacement and hip fracture , now in a wheelchair  HBa1c trend: not available   Previous patient of Dr Cormier and now sees Dr Schmitt in       Current Meds:  acetaminophen     Tablet .. 650 milliGRAM(s) Oral every 6 hours PRN  atorvastatin 80 milliGRAM(s) Oral at bedtime  dextrose 5%. 1000 milliLiter(s) IV Continuous <Continuous>  dextrose 5%. 1000 milliLiter(s) IV Continuous <Continuous>  dextrose 50% Injectable 25 Gram(s) IV Push once  dextrose 50% Injectable 12.5 Gram(s) IV Push once  dextrose 50% Injectable 25 Gram(s) IV Push once  dextrose Oral Gel 15 Gram(s) Oral once PRN  digoxin     Tablet 125 MICROGram(s) Oral every 24 hours  glucagon  Injectable 1 milliGRAM(s) IntraMuscular once  heparin   Injectable 5000 Unit(s) SubCutaneous every 8 hours  insulin glargine Injectable (LANTUS) 14 Unit(s) SubCutaneous at bedtime  insulin lispro (ADMELOG) corrective regimen sliding scale   SubCutaneous Before meals and at bedtime  levothyroxine 25 MICROGram(s) Oral daily  metoprolol tartrate Injectable 2.5 milliGRAM(s) IV Push every 6 hours  ondansetron Injectable 4 milliGRAM(s) IV Push every 6 hours PRN  pantoprazole    Tablet 40 milliGRAM(s) Oral before breakfast      Allergies:  ACE inhibitors (Hives)  carvedilol (Other)  enalapril (Hives)  Entresto (Other)      ROS:  Denies the following except as indicated.    General: weight loss/weight gain, decreased appetite, fatigue  Eyes: Blurry vision, double vision, visual changes  ENT: Throat pain, changes in voice,   CV: palpitations, SOB, CP, cough  GI: NVD, difficulty swallowing, abdominal pain  : polyuria, dysuria  Endo: abnormal menses, temperature intolerance, decreased libido  MSK: weakness, joint pain  Skin: rash, dryness, diaphoresis  Heme: Easy bruising,bleeding  Neuro: HA, dizziness, lightheadedness, numbness tingling  Psych: Anxiety, Depression    Vital Signs Last 24 Hrs  T(C): 36.9 (16 Jun 2022 13:12), Max: 37 (16 Jun 2022 09:02)  T(F): 98.4 (16 Jun 2022 13:12), Max: 98.6 (16 Jun 2022 09:02)  HR: 84 (16 Jun 2022 13:00) (82 - 94)  BP: 104/52 (16 Jun 2022 13:00) (104/52 - 131/71)  BP(mean): 75 (16 Jun 2022 13:00) (75 - 94)  RR: 18 (16 Jun 2022 13:00) (16 - 18)  SpO2: 95% (16 Jun 2022 13:00) (95% - 100%)  Height (cm): 185.4 (06-15 @ 17:06)  Weight (kg): 77.1 (06-15 @ 17:06)  BMI (kg/m2): 22.4 (06-15 @ 17:06)      Physical Exam: GENERAL: NAD, Resting comfortably in bed  HEENT: NCAT, MMM, Normal conjunctiva, PERRL  RESP: Nonlabored breathing, No respiratory distress  CARD: Normal rate, Normal peripheral perfusion  GI: Soft, ND, NT, No guarding, No rebound tenderness; RUQ fistula with minimal yellow pus drainage over dressing; no surrounding erethyma, fluctance, tenderness, or skin break down; prior surgical scars   EXTREM: WWP, No edema, R AKA, left ankle wound in dressing   NEURO: AAOx3, No focal motor or sensory deficits    LABS:                        10.9   9.08  )-----------( 307      ( 16 Jun 2022 05:39 )             36.8     06-16    137  |  104  |  27<H>  ----------------------------<  106<H>  4.2   |  23  |  0.97    Ca    8.8      16 Jun 2022 05:39  Phos  3.9     06-16  Mg     2.0     06-16    TPro  6.7  /  Alb  3.5  /  TBili  0.8  /  DBili  x   /  AST  22  /  ALT  22  /  AlkPhos  123<H>  06-16    PT/INR - ( 15 Nael 2022 18:50 )   PT: 15.0 sec;   INR: 1.26          PTT - ( 15 Nael 2022 18:50 )  PTT:26.7 sec      Thyroid Stimulating Hormone, Serum: 2.850 (01-08 @ 08:25)      RADIOLOGY & ADDITIONAL STUDIES:  CAPILLARY BLOOD GLUCOSE      POCT Blood Glucose.: 181 mg/dL (16 Jun 2022 12:54)  POCT Blood Glucose.: 124 mg/dL (16 Jun 2022 06:52)  POCT Blood Glucose.: 232 mg/dL (15 Nael 2022 22:00)

## 2022-06-24 NOTE — PROGRESS NOTE ADULT - SUBJECTIVE AND OBJECTIVE BOX
INTERVAL HPI/OVERNIGHT EVENTS: 6p K+ 5.4, given calcium gluconate x1 per chief, pt managing BS himself, FS still high, endo to be consulted in the AM, pt refusing straight caths, <300 on BS    STATUS POST:  6/22: lap Excision of fistula tract, partial cholecystectomy    POST OPERATIVE DAY #: 2    SUBJECTIVE: Pt seen and examined at bedside this am by surgery team. Reports appropriate post op pain. Tolerating diet, pain well controlled. +F/-BM. Denies f/n/v/cp/sob.    MEDICATIONS  (STANDING):  aspirin  chewable 81 milliGRAM(s) Oral every 24 hours  chlorhexidine 2% Cloths 1 Application(s) Topical <User Schedule>  dextrose 5%. 1000 milliLiter(s) (50 mL/Hr) IV Continuous <Continuous>  dextrose 5%. 1000 milliLiter(s) (100 mL/Hr) IV Continuous <Continuous>  dextrose 50% Injectable 25 Gram(s) IV Push once  dextrose 50% Injectable 12.5 Gram(s) IV Push once  dextrose 50% Injectable 25 Gram(s) IV Push once  digoxin     Tablet 125 MICROGram(s) Oral every 24 hours  glucagon  Injectable 1 milliGRAM(s) IntraMuscular once  heparin   Injectable 5000 Unit(s) SubCutaneous every 8 hours  levothyroxine 25 MICROGram(s) Oral daily  metoprolol tartrate Injectable 2.5 milliGRAM(s) IV Push every 6 hours  pantoprazole  Injectable 40 milliGRAM(s) IV Push every 24 hours  sodium chloride 0.9%. 1000 milliLiter(s) (40 mL/Hr) IV Continuous <Continuous>  sodium zirconium cyclosilicate 10 Gram(s) Oral every 8 hours    MEDICATIONS  (PRN):  dextrose Oral Gel 15 Gram(s) Oral once PRN Blood Glucose LESS THAN 70 milliGRAM(s)/deciliter  HYDROmorphone  Injectable 0.5 milliGRAM(s) IV Push every 4 hours PRN Moderate Pain (4 - 6)  HYDROmorphone  Injectable 1 milliGRAM(s) IV Push every 6 hours PRN Severe Pain (7 - 10)  ondansetron Injectable 4 milliGRAM(s) IV Push every 6 hours PRN Nausea and/or Vomiting      Vital Signs Last 24 Hrs  T(C): 36.6 (24 Jun 2022 09:34), Max: 36.6 (24 Jun 2022 09:34)  T(F): 97.8 (24 Jun 2022 09:34), Max: 97.8 (24 Jun 2022 09:34)  HR: 78 (24 Jun 2022 09:34) (74 - 78)  BP: 116/74 (24 Jun 2022 09:34) (103/57 - 117/66)  BP(mean): --  RR: 18 (24 Jun 2022 09:34) (17 - 18)  SpO2: 99% (24 Jun 2022 09:34) (92% - 99%)    PHYSICAL EXAM:    Constitutional: A&Ox3, NAD    Respiratory: non labored breathing, no respiratory distress    Cardiovascular: NSR, RRR    Gastrointestinal: abdomen soft, nd, appropriately ttp to incisions. Dressings c/d/i. changed at bedside.     Extremities: wwp, no calf tenderness or edema. SCDs in place     I&O's Detail    23 Jun 2022 07:01  -  24 Jun 2022 07:00  --------------------------------------------------------  IN:    Oral Fluid: 840 mL    sodium chloride 0.9%: 80 mL  Total IN: 920 mL    OUT:    Bulb (mL): 220 mL    Intermittent Catheterization - Urethral (mL): 400 mL  Total OUT: 620 mL    Total NET: 300 mL          LABS:                        10.8   16.48 )-----------( 307      ( 24 Jun 2022 06:30 )             37.1     06-24    135  |  97  |  50<H>  ----------------------------<  176<H>  5.0   |  26  |  1.74<H>    Ca    8.4      24 Jun 2022 06:30  Phos  5.2     06-24  Mg     2.2     06-24    TPro  6.5  /  Alb  3.3  /  TBili  0.8  /  DBili  x   /  AST  90<H>  /  ALT  154<H>  /  AlkPhos  174<H>  06-24    PT/INR - ( 23 Jun 2022 10:00 )   PT: 19.5 sec;   INR: 1.63            Urinalysis Basic - ( 23 Jun 2022 20:19 )    Color: Orange / Appearance: Clear / SG: >=1.030 / pH: x  Gluc: x / Ketone: NEGATIVE  / Bili: Negative / Urobili: 0.2 E.U./dL   Blood: x / Protein: 30 mg/dL / Nitrite: NEGATIVE   Leuk Esterase: Trace / RBC: < 5 /HPF / WBC Many /HPF   Sq Epi: x / Non Sq Epi: 0-5 /HPF / Bacteria: Present /HPF        RADIOLOGY & ADDITIONAL STUDIES:

## 2022-06-24 NOTE — CONSULT NOTE ADULT - NS ATTEND AMEND GEN_ALL_CORE FT
patient had elective admission for resection of a fistulous tract. He receives his Insulin from a [employer]tronic 670G pump but uses Cathleen CGMS. His glucose levels are running low currently on 3 units Insulin per hour:100-87. I agree with decreasing basal rate to 2 units/hour.

## 2022-06-24 NOTE — CONSULT NOTE ADULT - ATTENDING COMMENTS
I agree with the resident's findings and plans as written above with the following additions/amendments:    Seen and examined at bedside. Discussed history of obstructions in the past, since being wheelchair bound has been more frequent, last needing fields for a week before could void on his own regularly. Currently MOISES on CKD 2/2 repeat obstructions, also causing hyperkalemia, will need fields to avoid further deterioration. Further recs as above.

## 2022-06-25 LAB
ALBUMIN SERPL ELPH-MCNC: 2.9 G/DL — LOW (ref 3.3–5)
ALP SERPL-CCNC: 148 U/L — HIGH (ref 40–120)
ALT FLD-CCNC: 109 U/L — HIGH (ref 10–45)
ANION GAP SERPL CALC-SCNC: 10 MMOL/L — SIGNIFICANT CHANGE UP (ref 5–17)
AST SERPL-CCNC: 41 U/L — HIGH (ref 10–40)
BILIRUB SERPL-MCNC: 0.8 MG/DL — SIGNIFICANT CHANGE UP (ref 0.2–1.2)
BUN SERPL-MCNC: 40 MG/DL — HIGH (ref 7–23)
CALCIUM SERPL-MCNC: 8.2 MG/DL — LOW (ref 8.4–10.5)
CHLORIDE SERPL-SCNC: 101 MMOL/L — SIGNIFICANT CHANGE UP (ref 96–108)
CO2 SERPL-SCNC: 23 MMOL/L — SIGNIFICANT CHANGE UP (ref 22–31)
CREAT SERPL-MCNC: 1.23 MG/DL — SIGNIFICANT CHANGE UP (ref 0.5–1.3)
EGFR: 66 ML/MIN/1.73M2 — SIGNIFICANT CHANGE UP
GLUCOSE BLDC GLUCOMTR-MCNC: 138 MG/DL — HIGH (ref 70–99)
GLUCOSE BLDC GLUCOMTR-MCNC: 145 MG/DL — HIGH (ref 70–99)
GLUCOSE BLDC GLUCOMTR-MCNC: 158 MG/DL — HIGH (ref 70–99)
GLUCOSE BLDC GLUCOMTR-MCNC: 79 MG/DL — SIGNIFICANT CHANGE UP (ref 70–99)
GLUCOSE SERPL-MCNC: 112 MG/DL — HIGH (ref 70–99)
HCT VFR BLD CALC: 38.4 % — LOW (ref 39–50)
HGB BLD-MCNC: 11.5 G/DL — LOW (ref 13–17)
MAGNESIUM SERPL-MCNC: 2 MG/DL — SIGNIFICANT CHANGE UP (ref 1.6–2.6)
MCHC RBC-ENTMCNC: 22.1 PG — LOW (ref 27–34)
MCHC RBC-ENTMCNC: 29.9 GM/DL — LOW (ref 32–36)
MCV RBC AUTO: 73.8 FL — LOW (ref 80–100)
NRBC # BLD: 0 /100 WBCS — SIGNIFICANT CHANGE UP (ref 0–0)
PHOSPHATE SERPL-MCNC: 3.4 MG/DL — SIGNIFICANT CHANGE UP (ref 2.5–4.5)
PLATELET # BLD AUTO: 281 K/UL — SIGNIFICANT CHANGE UP (ref 150–400)
POTASSIUM SERPL-MCNC: 4.5 MMOL/L — SIGNIFICANT CHANGE UP (ref 3.5–5.3)
POTASSIUM SERPL-SCNC: 4.5 MMOL/L — SIGNIFICANT CHANGE UP (ref 3.5–5.3)
PROT SERPL-MCNC: 6.1 G/DL — SIGNIFICANT CHANGE UP (ref 6–8.3)
RBC # BLD: 5.2 M/UL — SIGNIFICANT CHANGE UP (ref 4.2–5.8)
RBC # FLD: 19.2 % — HIGH (ref 10.3–14.5)
SODIUM SERPL-SCNC: 134 MMOL/L — LOW (ref 135–145)
WBC # BLD: 14.9 K/UL — HIGH (ref 3.8–10.5)
WBC # FLD AUTO: 14.9 K/UL — HIGH (ref 3.8–10.5)

## 2022-06-25 PROCEDURE — 99232 SBSQ HOSP IP/OBS MODERATE 35: CPT | Mod: GC

## 2022-06-25 RX ORDER — INSULIN GLARGINE 100 [IU]/ML
20 INJECTION, SOLUTION SUBCUTANEOUS ONCE
Refills: 0 | Status: DISCONTINUED | OUTPATIENT
Start: 2022-06-25 | End: 2022-06-25

## 2022-06-25 RX ORDER — SODIUM CHLORIDE 9 MG/ML
1000 INJECTION, SOLUTION INTRAVENOUS
Refills: 0 | Status: DISCONTINUED | OUTPATIENT
Start: 2022-06-25 | End: 2022-06-28

## 2022-06-25 RX ORDER — DEXTROSE 50 % IN WATER 50 %
25 SYRINGE (ML) INTRAVENOUS ONCE
Refills: 0 | Status: DISCONTINUED | OUTPATIENT
Start: 2022-06-25 | End: 2022-06-28

## 2022-06-25 RX ORDER — INSULIN LISPRO 100/ML
VIAL (ML) SUBCUTANEOUS
Refills: 0 | Status: DISCONTINUED | OUTPATIENT
Start: 2022-06-25 | End: 2022-06-28

## 2022-06-25 RX ORDER — DEXTROSE 50 % IN WATER 50 %
12.5 SYRINGE (ML) INTRAVENOUS ONCE
Refills: 0 | Status: DISCONTINUED | OUTPATIENT
Start: 2022-06-25 | End: 2022-06-28

## 2022-06-25 RX ORDER — HYDROMORPHONE HYDROCHLORIDE 2 MG/ML
0.25 INJECTION INTRAMUSCULAR; INTRAVENOUS; SUBCUTANEOUS ONCE
Refills: 0 | Status: DISCONTINUED | OUTPATIENT
Start: 2022-06-25 | End: 2022-06-25

## 2022-06-25 RX ORDER — GLUCAGON INJECTION, SOLUTION 0.5 MG/.1ML
1 INJECTION, SOLUTION SUBCUTANEOUS ONCE
Refills: 0 | Status: DISCONTINUED | OUTPATIENT
Start: 2022-06-25 | End: 2022-06-28

## 2022-06-25 RX ORDER — DEXTROSE 50 % IN WATER 50 %
15 SYRINGE (ML) INTRAVENOUS ONCE
Refills: 0 | Status: DISCONTINUED | OUTPATIENT
Start: 2022-06-25 | End: 2022-06-28

## 2022-06-25 RX ADMIN — HYDROMORPHONE HYDROCHLORIDE 0.25 MILLIGRAM(S): 2 INJECTION INTRAMUSCULAR; INTRAVENOUS; SUBCUTANEOUS at 19:30

## 2022-06-25 RX ADMIN — OXYCODONE HYDROCHLORIDE 5 MILLIGRAM(S): 5 TABLET ORAL at 04:20

## 2022-06-25 RX ADMIN — Medication 125 MICROGRAM(S): at 07:00

## 2022-06-25 RX ADMIN — HYDROMORPHONE HYDROCHLORIDE 0.25 MILLIGRAM(S): 2 INJECTION INTRAMUSCULAR; INTRAVENOUS; SUBCUTANEOUS at 19:17

## 2022-06-25 RX ADMIN — CHLORHEXIDINE GLUCONATE 1 APPLICATION(S): 213 SOLUTION TOPICAL at 06:27

## 2022-06-25 RX ADMIN — HEPARIN SODIUM 5000 UNIT(S): 5000 INJECTION INTRAVENOUS; SUBCUTANEOUS at 22:56

## 2022-06-25 RX ADMIN — OXYCODONE HYDROCHLORIDE 5 MILLIGRAM(S): 5 TABLET ORAL at 19:00

## 2022-06-25 RX ADMIN — HEPARIN SODIUM 5000 UNIT(S): 5000 INJECTION INTRAVENOUS; SUBCUTANEOUS at 06:27

## 2022-06-25 RX ADMIN — Medication 81 MILLIGRAM(S): at 14:15

## 2022-06-25 RX ADMIN — HEPARIN SODIUM 5000 UNIT(S): 5000 INJECTION INTRAVENOUS; SUBCUTANEOUS at 14:14

## 2022-06-25 RX ADMIN — OXYCODONE HYDROCHLORIDE 5 MILLIGRAM(S): 5 TABLET ORAL at 18:26

## 2022-06-25 RX ADMIN — Medication 2.5 MILLIGRAM(S): at 14:14

## 2022-06-25 RX ADMIN — Medication 25 MICROGRAM(S): at 06:46

## 2022-06-25 RX ADMIN — OXYCODONE HYDROCHLORIDE 5 MILLIGRAM(S): 5 TABLET ORAL at 03:21

## 2022-06-25 RX ADMIN — Medication 2.5 MILLIGRAM(S): at 19:17

## 2022-06-25 RX ADMIN — PANTOPRAZOLE SODIUM 40 MILLIGRAM(S): 20 TABLET, DELAYED RELEASE ORAL at 22:56

## 2022-06-25 RX ADMIN — Medication 2.5 MILLIGRAM(S): at 06:27

## 2022-06-25 RX ADMIN — Medication 650 MILLIGRAM(S): at 06:28

## 2022-06-25 NOTE — PHYSICAL THERAPY INITIAL EVALUATION ADULT - PLANNED THERAPY INTERVENTIONS, PT EVAL
balance training/bed mobility training/postural re-education/strengthening/transfer training/wheelchair management/propulsion training

## 2022-06-25 NOTE — PHYSICAL THERAPY INITIAL EVALUATION ADULT - ADDITIONAL COMMENTS
Pt reports living in house with wife, with MITRA. Reports using ambulette service to navigate stairs when leaving and entering apartment. Reports being wheelchair bound, and is able to transfer from bed to/from chair using sliding board. Reports that wife assists pt with bathing and ADLs when needed. Reports being able to perform propulsion of wheelchair.

## 2022-06-25 NOTE — PHYSICAL THERAPY INITIAL EVALUATION ADULT - PERTINENT HX OF CURRENT PROBLEM, REHAB EVAL
67 yo M w pmh of CAD (s/p multiple surgeries), CHF (15-20% EF), uncontrolled DM, HLD, HTN and R TKA presented with a history of acute cholecystitis s/p perc sudhir in 2/2020 c/b cholecystocutaneous fistula planned for a takedown and cholecystectomy, pt is now s/p lap Excision of fistula tract, partial cholecystectomy on 6/22.

## 2022-06-25 NOTE — PHYSICAL THERAPY INITIAL EVALUATION ADULT - GENERAL OBSERVATIONS, REHAB EVAL
PT IE completed. Chart reviewed. Pt received semi-supine, NAD, +tele, +2LO2NC, +R abdominal dressing C/D/I, AKA, +contact. NICANOR Jimenez cleared pt for PT.

## 2022-06-25 NOTE — PROGRESS NOTE ADULT - SUBJECTIVE AND OBJECTIVE BOX
Patient is a 66y Male seen and evaluated at bedside. patient was found to be retaining 1L of urine yesterday and fields catheter was placed with improvement in Cr and potassium; K 4.5 Cr 1.23 /76 bicarb 23 HGb 11.5  patient states he is feeling better and is anxious to go home-      Meds:    acetaminophen     Tablet .. 650 every 6 hours PRN  aspirin  chewable 81 every 24 hours  chlorhexidine 2% Cloths 1 <User Schedule>  dextrose 5%. 1000 <Continuous>  dextrose 5%. 1000 <Continuous>  dextrose 5%. 1000 <Continuous>  dextrose 5%. 1000 <Continuous>  dextrose 50% Injectable 25 once  dextrose 50% Injectable 12.5 once  dextrose 50% Injectable 25 once  dextrose 50% Injectable 25 once  dextrose 50% Injectable 12.5 once  dextrose 50% Injectable 25 once  dextrose Oral Gel 15 once PRN  dextrose Oral Gel 15 once PRN  digoxin     Tablet 125 every 24 hours  glucagon  Injectable 1 once  glucagon  Injectable 1 once  heparin   Injectable 5000 every 8 hours  insulin lispro (ADMELOG) corrective regimen sliding scale  three times a day before meals  insulin lispro (HumaLOG) Pump 1 Continuous Pump  levothyroxine 25 daily  metoprolol tartrate Injectable 2.5 every 6 hours  ondansetron Injectable 4 every 6 hours PRN  oxyCODONE    IR 5 every 4 hours PRN  pantoprazole  Injectable 40 every 24 hours  sodium zirconium cyclosilicate 10 every 8 hours      T(C): , Max: 36.7 (06-24-22 @ 13:25)  T(F): , Max: 98 (06-24-22 @ 13:25)  HR: 87 (06-25-22 @ 06:27)  BP: 127/76 (06-25-22 @ 06:27)  BP(mean): --  RR: 18 (06-25-22 @ 06:27)  SpO2: 91% (06-25-22 @ 06:27)  Wt(kg): --    06-24 @ 07:01  -  06-25 @ 07:00  --------------------------------------------------------  IN: 1200 mL / OUT: 1595 mL / NET: -395 mL          Review of Systems:  all other ROS negative       PHYSICAL EXAM:  GENERAL: NAD resting comfortably in bed   NECK: supple, No JVD  CHEST/LUNG: Clear to auscultation bilaterally  HEART: normal S1S2, RRR  ABDOMEN: Soft, Nontender, +BS, No flank tenderness bilateral  EXTREMITIES: No clubbing, cyanosis, or edema       LABS:                        11.5   14.90 )-----------( 281      ( 25 Jun 2022 07:43 )             38.4     06-25    134<L>  |  101  |  40<H>  ----------------------------<  112<H>  4.5   |  23  |  1.23    Ca    8.2<L>      25 Jun 2022 07:43  Phos  3.4     06-25  Mg     2.0     06-25    TPro  6.1  /  Alb  2.9<L>  /  TBili  0.8  /  DBili  x   /  AST  41<H>  /  ALT  109<H>  /  AlkPhos  148<H>  06-25      PT/INR - ( 23 Jun 2022 10:00 )   PT: 19.5 sec;   INR: 1.63            Urinalysis Basic - ( 23 Jun 2022 20:19 )    Color: Orange / Appearance: Clear / SG: >=1.030 / pH: x  Gluc: x / Ketone: NEGATIVE  / Bili: Negative / Urobili: 0.2 E.U./dL   Blood: x / Protein: 30 mg/dL / Nitrite: NEGATIVE   Leuk Esterase: Trace / RBC: < 5 /HPF / WBC Many /HPF   Sq Epi: x / Non Sq Epi: 0-5 /HPF / Bacteria: Present /HPF      Sodium, Random Urine: 20 mmol/L (06-23 @ 20:19)  Creatinine, Random Urine: 97 mg/dL (06-23 @ 20:19)  Protein/Creatinine Ratio Calculation: 0.7 Ratio (06-23 @ 20:19)  Osmolality, Random Urine: 502 mosm/kg (06-23 @ 20:19)  Potassium, Random Urine: 67 mmol/L (06-23 @ 20:19)        RADIOLOGY & ADDITIONAL STUDIES:

## 2022-06-25 NOTE — PHYSICAL THERAPY INITIAL EVALUATION ADULT - THERAPY FREQUENCY, PT EVAL
Patient educated on frequency of inpatient physical therapy at [facility], patient verbalized understanding./2-3x/week

## 2022-06-25 NOTE — PROGRESS NOTE ADULT - ASSESSMENT
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 King in 04/08/2021, presenting for elective resection of fistula tract. Now s/p takedown and cholecystectomy 6/22/22. Renal consulted for MOISES w/ Cr 1.5, (baseline 1) and hyperkalemia.      #MOISES - . Also obstructive MOISES w/ urinary retention noted on US and hyperkalemia 2/2 decreased Na delivery to distal nephron.     - Recommend placing Loja   - Close monitoring of I/Os  - Encouraged PO Intake   - Can c/w IV Hydration   - Trend BUN/Cr     #Hyperkalemia - K+ 5.9 treated with insulin/D50, lokelma 10g q8h for 24hrs. Likely 2/2 to MOISES 2/2 urinary retention. Natriuresis will help with hyperkalemia.     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 King in 04/08/2021, presenting for elective resection of fistula tract. Now s/p takedown and cholecystectomy 6/22/22. Renal consulted for MOISES w/ Cr 1.5, (baseline 1) and hyperkalemia.      #MOISES - . Also obstructive MOISES w/ urinary retention noted on US and hyperkalemia 2/2 decreased Na delivery to distal nephron.   resoled  - Close monitoring of I/Os  - Encouraged PO Intake   - Can c/w IV Hydration       #Hyperkalemia resolved    Carlota Leung D.O  PGY 4 nephrology fellow  [phone]        66M w/ PMHx of CAD s/p MIDCAB/VERONICA 2018, AICD (2/2020), CHF (EF 15-20%) uncontrolled DM, HLD, HTN, R TKA several years ago c/b recurrent PJI and MRSA bacteremia and multiple revisions AKA 1/2022 and history of acute cholecystitis s/p perc sudhir 2/28/20 (drain removed in late May 2020) c/b by cholecystocutaneous excision of cholecystocutaneous fistulous tract with Dr. King in 04/08/2021, presenting for elective resection of fistula tract. Now s/p takedown and cholecystectomy 6/22/22. Renal consulted for MOISES w/ Cr 1.5, (baseline 1) and hyperkalemia.      #MOISES - . Also obstructive MOISES w/ urinary retention noted on US and hyperkalemia 2/2 decreased Na delivery to distal nephron.   resolved  - Close monitoring of I/Os  - Encouraged PO Intake   - Can c/w IV Hydration       #Hyperkalemia resolved    Carlota Leung D.O  PGY 4 nephrology fellow  [phone]

## 2022-06-25 NOTE — PHYSICAL THERAPY INITIAL EVALUATION ADULT - DIAGNOSIS, PT EVAL
5A: Primary Prevention/Risk Reduction for Loss of Balance and Falling; 4I: Impaired Joint Mobility, Motor Function, Muscle Performance, and Range of Motion Associated with Bony or Soft Tissue Surgery

## 2022-06-25 NOTE — PHYSICAL THERAPY INITIAL EVALUATION ADULT - AMBULATION SKILLS, REHAB EVAL
Verified Results  (1) BASIC METABOLIC PROFILE [id number] 09:01AM [place] Order Number: [id number]     Test Name Result Flag Reference   GLUCOSE,RANDM 90 mg/dL     If the patient is fasting, the ADA then defines impaired fasting glucose as > 100 mg/dL and diabetes as > or equal to 123 mg/dL  SODIUM 142 mmol/L  136-145   POTASSIUM 3 8 mmol/L  3 5-5 3   CHLORIDE 106 mmol/L  100-108   CARBON DIOXIDE 31 mmol/L  21-32   ANION GAP (CALC) 5 mmol/L  4-13   BLOOD UREA NITROGEN 20 mg/dL  5-25   CREATININE 1 59 mg/dL H 0 60-1 30   Standardized to IDMS reference method   CALCIUM 9 5 mg/dL  8 3-10 1   eGFR Non-African American 42 4 ml/min/1 73sq m     [facility] [employer] Disease Education Program recommendations are as follows:  GFR calculation is accurate only with a steady state creatinine  Chronic Kidney disease less than 60 ml/min/1 73 sq  meters  Kidney failure less than 15 ml/min/1 73 sq  meters 
needs device and assist

## 2022-06-26 LAB
ALBUMIN SERPL ELPH-MCNC: 2.9 G/DL — LOW (ref 3.3–5)
ALP SERPL-CCNC: 136 U/L — HIGH (ref 40–120)
ALT FLD-CCNC: 72 U/L — HIGH (ref 10–45)
ANION GAP SERPL CALC-SCNC: 11 MMOL/L — SIGNIFICANT CHANGE UP (ref 5–17)
AST SERPL-CCNC: 22 U/L — SIGNIFICANT CHANGE UP (ref 10–40)
BILIRUB SERPL-MCNC: 0.8 MG/DL — SIGNIFICANT CHANGE UP (ref 0.2–1.2)
BUN SERPL-MCNC: 29 MG/DL — HIGH (ref 7–23)
CALCIUM SERPL-MCNC: 8.2 MG/DL — LOW (ref 8.4–10.5)
CHLORIDE SERPL-SCNC: 98 MMOL/L — SIGNIFICANT CHANGE UP (ref 96–108)
CO2 SERPL-SCNC: 23 MMOL/L — SIGNIFICANT CHANGE UP (ref 22–31)
CREAT SERPL-MCNC: 1.09 MG/DL — SIGNIFICANT CHANGE UP (ref 0.5–1.3)
EGFR: 76 ML/MIN/1.73M2 — SIGNIFICANT CHANGE UP
GLUCOSE BLDC GLUCOMTR-MCNC: 173 MG/DL — HIGH (ref 70–99)
GLUCOSE BLDC GLUCOMTR-MCNC: 177 MG/DL — HIGH (ref 70–99)
GLUCOSE BLDC GLUCOMTR-MCNC: 214 MG/DL — HIGH (ref 70–99)
GLUCOSE BLDC GLUCOMTR-MCNC: 232 MG/DL — HIGH (ref 70–99)
GLUCOSE SERPL-MCNC: 183 MG/DL — HIGH (ref 70–99)
HCT VFR BLD CALC: 37.8 % — LOW (ref 39–50)
HGB BLD-MCNC: 11.2 G/DL — LOW (ref 13–17)
MAGNESIUM SERPL-MCNC: 1.9 MG/DL — SIGNIFICANT CHANGE UP (ref 1.6–2.6)
MCHC RBC-ENTMCNC: 21.8 PG — LOW (ref 27–34)
MCHC RBC-ENTMCNC: 29.6 GM/DL — LOW (ref 32–36)
MCV RBC AUTO: 73.7 FL — LOW (ref 80–100)
NRBC # BLD: 0 /100 WBCS — SIGNIFICANT CHANGE UP (ref 0–0)
PHOSPHATE SERPL-MCNC: 2.6 MG/DL — SIGNIFICANT CHANGE UP (ref 2.5–4.5)
PLATELET # BLD AUTO: 266 K/UL — SIGNIFICANT CHANGE UP (ref 150–400)
POTASSIUM SERPL-MCNC: 4.7 MMOL/L — SIGNIFICANT CHANGE UP (ref 3.5–5.3)
POTASSIUM SERPL-SCNC: 4.7 MMOL/L — SIGNIFICANT CHANGE UP (ref 3.5–5.3)
PROT SERPL-MCNC: 6.3 G/DL — SIGNIFICANT CHANGE UP (ref 6–8.3)
RBC # BLD: 5.13 M/UL — SIGNIFICANT CHANGE UP (ref 4.2–5.8)
RBC # FLD: 19.3 % — HIGH (ref 10.3–14.5)
SODIUM SERPL-SCNC: 132 MMOL/L — LOW (ref 135–145)
WBC # BLD: 13.05 K/UL — HIGH (ref 3.8–10.5)
WBC # FLD AUTO: 13.05 K/UL — HIGH (ref 3.8–10.5)

## 2022-06-26 RX ORDER — HYDROMORPHONE HYDROCHLORIDE 2 MG/ML
0.25 INJECTION INTRAMUSCULAR; INTRAVENOUS; SUBCUTANEOUS ONCE
Refills: 0 | Status: DISCONTINUED | OUTPATIENT
Start: 2022-06-26 | End: 2022-06-26

## 2022-06-26 RX ADMIN — Medication 81 MILLIGRAM(S): at 09:53

## 2022-06-26 RX ADMIN — Medication 2.5 MILLIGRAM(S): at 03:55

## 2022-06-26 RX ADMIN — HEPARIN SODIUM 5000 UNIT(S): 5000 INJECTION INTRAVENOUS; SUBCUTANEOUS at 13:51

## 2022-06-26 RX ADMIN — HYDROMORPHONE HYDROCHLORIDE 0.25 MILLIGRAM(S): 2 INJECTION INTRAMUSCULAR; INTRAVENOUS; SUBCUTANEOUS at 09:08

## 2022-06-26 RX ADMIN — Medication 125 MICROGRAM(S): at 07:39

## 2022-06-26 RX ADMIN — PANTOPRAZOLE SODIUM 40 MILLIGRAM(S): 20 TABLET, DELAYED RELEASE ORAL at 22:19

## 2022-06-26 RX ADMIN — Medication 4: at 17:44

## 2022-06-26 RX ADMIN — OXYCODONE HYDROCHLORIDE 5 MILLIGRAM(S): 5 TABLET ORAL at 00:21

## 2022-06-26 RX ADMIN — OXYCODONE HYDROCHLORIDE 5 MILLIGRAM(S): 5 TABLET ORAL at 22:55

## 2022-06-26 RX ADMIN — OXYCODONE HYDROCHLORIDE 5 MILLIGRAM(S): 5 TABLET ORAL at 01:21

## 2022-06-26 RX ADMIN — OXYCODONE HYDROCHLORIDE 5 MILLIGRAM(S): 5 TABLET ORAL at 05:31

## 2022-06-26 RX ADMIN — OXYCODONE HYDROCHLORIDE 5 MILLIGRAM(S): 5 TABLET ORAL at 06:31

## 2022-06-26 RX ADMIN — OXYCODONE HYDROCHLORIDE 5 MILLIGRAM(S): 5 TABLET ORAL at 19:40

## 2022-06-26 RX ADMIN — OXYCODONE HYDROCHLORIDE 5 MILLIGRAM(S): 5 TABLET ORAL at 23:55

## 2022-06-26 RX ADMIN — Medication 2: at 12:24

## 2022-06-26 RX ADMIN — HEPARIN SODIUM 5000 UNIT(S): 5000 INJECTION INTRAVENOUS; SUBCUTANEOUS at 22:19

## 2022-06-26 RX ADMIN — Medication 2.5 MILLIGRAM(S): at 14:51

## 2022-06-26 RX ADMIN — Medication 25 MICROGRAM(S): at 05:31

## 2022-06-26 RX ADMIN — Medication 2.5 MILLIGRAM(S): at 09:53

## 2022-06-26 RX ADMIN — Medication 62.5 MILLIMOLE(S): at 14:16

## 2022-06-26 RX ADMIN — CHLORHEXIDINE GLUCONATE 1 APPLICATION(S): 213 SOLUTION TOPICAL at 05:31

## 2022-06-26 RX ADMIN — Medication 2: at 07:38

## 2022-06-26 RX ADMIN — OXYCODONE HYDROCHLORIDE 5 MILLIGRAM(S): 5 TABLET ORAL at 19:10

## 2022-06-26 RX ADMIN — HEPARIN SODIUM 5000 UNIT(S): 5000 INJECTION INTRAVENOUS; SUBCUTANEOUS at 05:31

## 2022-06-26 RX ADMIN — HYDROMORPHONE HYDROCHLORIDE 0.25 MILLIGRAM(S): 2 INJECTION INTRAMUSCULAR; INTRAVENOUS; SUBCUTANEOUS at 09:24

## 2022-06-26 NOTE — PROGRESS NOTE ADULT - SUBJECTIVE AND OBJECTIVE BOX
Patient is a 66y Male seen and evaluated at bedside. no acute events overnight patient is feeling better; /63 K 4.7 Cr 1.09 Hgb 11.2 uop 1.3L/24 hours       Meds:    acetaminophen     Tablet .. 650 every 6 hours PRN  aspirin  chewable 81 every 24 hours  chlorhexidine 2% Cloths 1 <User Schedule>  dextrose 5%. 1000 <Continuous>  dextrose 5%. 1000 <Continuous>  dextrose 5%. 1000 <Continuous>  dextrose 5%. 1000 <Continuous>  dextrose 50% Injectable 25 once  dextrose 50% Injectable 12.5 once  dextrose 50% Injectable 25 once  dextrose 50% Injectable 25 once  dextrose 50% Injectable 12.5 once  dextrose 50% Injectable 25 once  dextrose Oral Gel 15 once PRN  dextrose Oral Gel 15 once PRN  digoxin     Tablet 125 every 24 hours  glucagon  Injectable 1 once  glucagon  Injectable 1 once  heparin   Injectable 5000 every 8 hours  insulin lispro (ADMELOG) corrective regimen sliding scale  three times a day before meals  levothyroxine 25 daily  metoprolol tartrate Injectable 2.5 every 6 hours  ondansetron Injectable 4 every 6 hours PRN  oxyCODONE    IR 5 every 4 hours PRN  pantoprazole  Injectable 40 every 24 hours  sodium phosphate IVPB 15 once  sodium zirconium cyclosilicate 10 every 8 hours      T(C): , Max: 37.1 (06-25-22 @ 18:07)  T(F): , Max: 98.8 (06-25-22 @ 18:07)  HR: 78 (06-26-22 @ 05:08)  BP: 124/63 (06-26-22 @ 05:08)  BP(mean): 87 (06-25-22 @ 14:39)  RR: 17 (06-26-22 @ 05:08)  SpO2: 99% (06-26-22 @ 05:08)  Wt(kg): --    06-25 @ 07:01  -  06-26 @ 07:00  --------------------------------------------------------  IN: 0 mL / OUT: 1275 mL / NET: -1275 mL          Review of Systems:  all other ROS negative       PHYSICAL EXAM:  GENERAL: NAD resting comfortably   NECK: supple, No JVD  CHEST/LUNG: CTA BL no rales, rhonchi or wheezing   HEART: normal S1S2, RRR  ABDOMEN: Soft, Nontender, +BS, No flank tenderness bilateral  EXTREMITIES: No clubbing, cyanosis, or edema         LABS:                        11.2   13.05 )-----------( 266      ( 26 Jun 2022 05:30 )             37.8     06-26    132<L>  |  98  |  29<H>  ----------------------------<  183<H>  4.7   |  23  |  1.09    Ca    8.2<L>      26 Jun 2022 05:30  Phos  2.6     06-26  Mg     1.9     06-26    TPro  6.3  /  Alb  2.9<L>  /  TBili  0.8  /  DBili  x   /  AST  22  /  ALT  72<H>  /  AlkPhos  136<H>  06-26                RADIOLOGY & ADDITIONAL STUDIES:

## 2022-06-26 NOTE — PROGRESS NOTE ADULT - SUBJECTIVE AND OBJECTIVE BOX
INTERVAL HPI/OVERNIGHT EVENTS:    STATUS POST:      POST OPERATIVE DAY #:     SUBJECTIVE:  Flatus: [ ] YES [ ] NO             Bowel Movement: [ ] YES [ ] NO  Pain (0-10):            Pain Control Adequate: [ ] YES [ ] NO  Nausea: [ ] YES [ ] NO            Vomiting: [ ] YES [ ] NO  Diarrhea: [ ] YES [ ] NO         Constipation: [ ] YES [ ] NO     Chest Pain: [ ] YES [ ] NO    SOB:  [ ] YES [ ] NO    MEDICATIONS  (STANDING):  aspirin  chewable 81 milliGRAM(s) Oral every 24 hours  chlorhexidine 2% Cloths 1 Application(s) Topical <User Schedule>  dextrose 5%. 1000 milliLiter(s) (50 mL/Hr) IV Continuous <Continuous>  dextrose 5%. 1000 milliLiter(s) (100 mL/Hr) IV Continuous <Continuous>  dextrose 5%. 1000 milliLiter(s) (50 mL/Hr) IV Continuous <Continuous>  dextrose 5%. 1000 milliLiter(s) (100 mL/Hr) IV Continuous <Continuous>  dextrose 50% Injectable 25 Gram(s) IV Push once  dextrose 50% Injectable 12.5 Gram(s) IV Push once  dextrose 50% Injectable 25 Gram(s) IV Push once  dextrose 50% Injectable 25 Gram(s) IV Push once  dextrose 50% Injectable 12.5 Gram(s) IV Push once  dextrose 50% Injectable 25 Gram(s) IV Push once  digoxin     Tablet 125 MICROGram(s) Oral every 24 hours  glucagon  Injectable 1 milliGRAM(s) IntraMuscular once  glucagon  Injectable 1 milliGRAM(s) IntraMuscular once  heparin   Injectable 5000 Unit(s) SubCutaneous every 8 hours  insulin lispro (ADMELOG) corrective regimen sliding scale   SubCutaneous three times a day before meals  levothyroxine 25 MICROGram(s) Oral daily  metoprolol tartrate Injectable 2.5 milliGRAM(s) IV Push every 6 hours  pantoprazole  Injectable 40 milliGRAM(s) IV Push every 24 hours  sodium zirconium cyclosilicate 10 Gram(s) Oral every 8 hours    MEDICATIONS  (PRN):  acetaminophen     Tablet .. 650 milliGRAM(s) Oral every 6 hours PRN Moderate Pain (4 - 6)  dextrose Oral Gel 15 Gram(s) Oral once PRN Blood Glucose LESS THAN 70 milliGRAM(s)/deciliter  dextrose Oral Gel 15 Gram(s) Oral once PRN Blood Glucose LESS THAN 70 milliGRAM(s)/deciliter  ondansetron Injectable 4 milliGRAM(s) IV Push every 6 hours PRN Nausea and/or Vomiting  oxyCODONE    IR 5 milliGRAM(s) Oral every 4 hours PRN Severe Pain (7 - 10)      Vital Signs Last 24 Hrs  T(C): 36.6 (26 Jun 2022 05:08), Max: 37.1 (25 Jun 2022 18:07)  T(F): 97.9 (26 Jun 2022 05:08), Max: 98.8 (25 Jun 2022 18:07)  HR: 78 (26 Jun 2022 05:08) (78 - 97)  BP: 124/63 (26 Jun 2022 05:08) (117/72 - 127/76)  BP(mean): 87 (25 Jun 2022 14:39) (87 - 87)  RR: 17 (26 Jun 2022 05:08) (17 - 18)  SpO2: 99% (26 Jun 2022 05:08) (91% - 99%)    PHYSICAL EXAM:      Constitutional: A&Ox3    Breasts:    Respiratory: non labored breathing, no respiratory distress    Cardiovascular: NSR, RRR    Gastrointestinal:                 Incision:    Genitourinary:    Extremities: (-) edema                  I&O's Detail    24 Jun 2022 07:01  -  25 Jun 2022 07:00  --------------------------------------------------------  IN:    Oral Fluid: 720 mL    sodium chloride 0.9%: 480 mL  Total IN: 1200 mL    OUT:    Bulb (mL): 20 mL    Intermittent Catheterization - Urethral (mL): 375 mL    Voided (mL): 1200 mL  Total OUT: 1595 mL    Total NET: -395 mL      25 Jun 2022 07:01  -  26 Jun 2022 06:22  --------------------------------------------------------  IN:  Total IN: 0 mL    OUT:    Voided (mL): 1075 mL  Total OUT: 1075 mL    Total NET: -1075 mL          LABS:                        11.5   14.90 )-----------( 281      ( 25 Jun 2022 07:43 )             38.4     06-25    134<L>  |  101  |  40<H>  ----------------------------<  112<H>  4.5   |  23  |  1.23    Ca    8.2<L>      25 Jun 2022 07:43  Phos  3.4     06-25  Mg     2.0     06-25    TPro  6.1  /  Alb  2.9<L>  /  TBili  0.8  /  DBili  x   /  AST  41<H>  /  ALT  109<H>  /  AlkPhos  148<H>  06-25          RADIOLOGY & ADDITIONAL STUDIES: patient removed his own packing overnight, has no adhesive for his pump, was put back on ISS    POD #3: lap excision of fistula tract, partial cholecystectomy     SUBJECTIVE:  Patient is doing well this morning, seen at bedside with chief, denies any new complaints. Denies any nausea, vomiting, chest pain, shortness of breath, calf tenderness, fever or chills. Passing flatus but does not report any bowel movements. Tolerating diet, ambulating as tolerated.     MEDICATIONS  (STANDING):  aspirin  chewable 81 milliGRAM(s) Oral every 24 hours  chlorhexidine 2% Cloths 1 Application(s) Topical <User Schedule>  dextrose 5%. 1000 milliLiter(s) (50 mL/Hr) IV Continuous <Continuous>  dextrose 5%. 1000 milliLiter(s) (100 mL/Hr) IV Continuous <Continuous>  dextrose 5%. 1000 milliLiter(s) (50 mL/Hr) IV Continuous <Continuous>  dextrose 5%. 1000 milliLiter(s) (100 mL/Hr) IV Continuous <Continuous>  dextrose 50% Injectable 25 Gram(s) IV Push once  dextrose 50% Injectable 12.5 Gram(s) IV Push once  dextrose 50% Injectable 25 Gram(s) IV Push once  dextrose 50% Injectable 25 Gram(s) IV Push once  dextrose 50% Injectable 12.5 Gram(s) IV Push once  dextrose 50% Injectable 25 Gram(s) IV Push once  digoxin     Tablet 125 MICROGram(s) Oral every 24 hours  glucagon  Injectable 1 milliGRAM(s) IntraMuscular once  glucagon  Injectable 1 milliGRAM(s) IntraMuscular once  heparin   Injectable 5000 Unit(s) SubCutaneous every 8 hours  insulin lispro (ADMELOG) corrective regimen sliding scale   SubCutaneous three times a day before meals  levothyroxine 25 MICROGram(s) Oral daily  metoprolol tartrate Injectable 2.5 milliGRAM(s) IV Push every 6 hours  pantoprazole  Injectable 40 milliGRAM(s) IV Push every 24 hours  sodium zirconium cyclosilicate 10 Gram(s) Oral every 8 hours    MEDICATIONS  (PRN):  acetaminophen     Tablet .. 650 milliGRAM(s) Oral every 6 hours PRN Moderate Pain (4 - 6)  dextrose Oral Gel 15 Gram(s) Oral once PRN Blood Glucose LESS THAN 70 milliGRAM(s)/deciliter  dextrose Oral Gel 15 Gram(s) Oral once PRN Blood Glucose LESS THAN 70 milliGRAM(s)/deciliter  ondansetron Injectable 4 milliGRAM(s) IV Push every 6 hours PRN Nausea and/or Vomiting  oxyCODONE    IR 5 milliGRAM(s) Oral every 4 hours PRN Severe Pain (7 - 10)      Vital Signs Last 24 Hrs  T(C): 36.6 (26 Jun 2022 05:08), Max: 37.1 (25 Jun 2022 18:07)  T(F): 97.9 (26 Jun 2022 05:08), Max: 98.8 (25 Jun 2022 18:07)  HR: 78 (26 Jun 2022 05:08) (78 - 97)  BP: 124/63 (26 Jun 2022 05:08) (117/72 - 127/76)  BP(mean): 87 (25 Jun 2022 14:39) (87 - 87)  RR: 17 (26 Jun 2022 05:08) (17 - 18)  SpO2: 99% (26 Jun 2022 05:08) (91% - 99%)    PHYSICAL EXAM:  Constitutional: AAOx3, no acute distress  HEENT: NCAT, airway patent  Cardiovascular: RRR, pulses present bilaterally  Respiratory: nonlabored breathing  Gastrointestinal: abdomen soft, nontender, non distended, no rebound or guarding, dressing are clean dry and intact, wet to dry changed at bedside   Neuro: no focal deficits    I&O's Detail    24 Jun 2022 07:01  -  25 Jun 2022 07:00  --------------------------------------------------------  IN:    Oral Fluid: 720 mL    sodium chloride 0.9%: 480 mL  Total IN: 1200 mL    OUT:    Bulb (mL): 20 mL    Intermittent Catheterization - Urethral (mL): 375 mL    Voided (mL): 1200 mL  Total OUT: 1595 mL    Total NET: -395 mL      25 Jun 2022 07:01  -  26 Jun 2022 06:22  --------------------------------------------------------  IN:  Total IN: 0 mL    OUT:    Voided (mL): 1075 mL  Total OUT: 1075 mL    Total NET: -1075 mL          LABS:                        11.5   14.90 )-----------( 281      ( 25 Jun 2022 07:43 )             38.4     06-25    134<L>  |  101  |  40<H>  ----------------------------<  112<H>  4.5   |  23  |  1.23    Ca    8.2<L>      25 Jun 2022 07:43  Phos  3.4     06-25  Mg     2.0     06-25    TPro  6.1  /  Alb  2.9<L>  /  TBili  0.8  /  DBili  x   /  AST  41<H>  /  ALT  109<H>  /  AlkPhos  148<H>  06-25          RADIOLOGY & ADDITIONAL STUDIES:

## 2022-06-26 NOTE — PROGRESS NOTE ADULT - ASSESSMENT
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 King in 04/08/2021, presenting for elective resection of fistula tract. Now s/p takedown and cholecystectomy 6/22/22. Renal consulted for MOISES w/ Cr 1.5, (baseline 1) and hyperkalemia.      #MOISES - .  obstructive MOISES w/ urinary retention noted on US and hyperkalemia 2/2 decreased Na delivery to distal nephron.   resolved  - Close monitoring of I/Os  - Encouraged PO Intake       #Hyperkalemia resolved  [facility]    Carlota Leung D.O  PGY 4 nephrology fellow  [phone]        66M w/ PMHx of CAD s/p MIDCAB/VERONICA 2018, AICD (2/2020), CHF (EF 15-20%) uncontrolled DM, HLD, HTN, R TKA several years ago c/b recurrent PJI and MRSA bacteremia and multiple revisions AKA 1/2022 and history of acute cholecystitis s/p perc sudhir 2/28/20 (drain removed in late May 2020) c/b by cholecystocutaneous excision of cholecystocutaneous fistulous tract with Dr. King in 04/08/2021, presenting for elective resection of fistula tract. Now s/p takedown and cholecystectomy 6/22/22. Renal consulted for MOISES w/ Cr 1.5, (baseline 1) and hyperkalemia.      #MOISES - .  obstructive MOISES w/ urinary retention noted on US and hyperkalemia 2/2 decreased Na delivery to distal nephron.   resolved  - Close monitoring of I/Os  - Encouraged PO Intake       #Hyperkalemia resolved  can DC standing anisama    will sign off at this time however please dont hesitate to reconsult us    Carlota Leung D.O  PGY 4 nephrology fellow  [phone]

## 2022-06-26 NOTE — PROVIDER CONTACT NOTE (OTHER) - BACKGROUND
s/p lap excision of fistula tract, partial cholecystectomy
s/p lap excision of fistula tract, partial cholecystectomy
no

## 2022-06-26 NOTE — PROGRESS NOTE ADULT - ASSESSMENT
67 yo M w pmh of CAD (s/p multiple surgeries), CHF (15-20% EF), uncontrolled DM, HLD, HTN and R TKA presented with a history of acute cholecystitis s/p perc sudhir in 2/2020 c/b cholecystocutaneous fistula planned for a takedown and cholecystectomy, pt is now s/p lap Excision of fistula tract, partial cholecystectomy on 6/22.    Pain/nausea control  CLD CC w/ ensures max 1 BID, NS @ 40  HSQ/ASA, SCDs  AM Labs  F/u renal recs for MOISES/hyperK; monitor UOP  F/u endo recs for hyperglycemia   F/u PT   Patient did not have family visit today, no adhesive for pump will continue mISS

## 2022-06-27 LAB
ALBUMIN SERPL ELPH-MCNC: 2.8 G/DL — LOW (ref 3.3–5)
ALP SERPL-CCNC: 124 U/L — HIGH (ref 40–120)
ALT FLD-CCNC: 52 U/L — HIGH (ref 10–45)
ANION GAP SERPL CALC-SCNC: 13 MMOL/L — SIGNIFICANT CHANGE UP (ref 5–17)
AST SERPL-CCNC: 21 U/L — SIGNIFICANT CHANGE UP (ref 10–40)
BILIRUB SERPL-MCNC: 0.8 MG/DL — SIGNIFICANT CHANGE UP (ref 0.2–1.2)
BUN SERPL-MCNC: 29 MG/DL — HIGH (ref 7–23)
CALCIUM SERPL-MCNC: 8.2 MG/DL — LOW (ref 8.4–10.5)
CHLORIDE SERPL-SCNC: 101 MMOL/L — SIGNIFICANT CHANGE UP (ref 96–108)
CO2 SERPL-SCNC: 23 MMOL/L — SIGNIFICANT CHANGE UP (ref 22–31)
CREAT SERPL-MCNC: 0.99 MG/DL — SIGNIFICANT CHANGE UP (ref 0.5–1.3)
EGFR: 85 ML/MIN/1.73M2 — SIGNIFICANT CHANGE UP
GLUCOSE BLDC GLUCOMTR-MCNC: 241 MG/DL — HIGH (ref 70–99)
GLUCOSE BLDC GLUCOMTR-MCNC: 247 MG/DL — HIGH (ref 70–99)
GLUCOSE BLDC GLUCOMTR-MCNC: 247 MG/DL — HIGH (ref 70–99)
GLUCOSE BLDC GLUCOMTR-MCNC: 250 MG/DL — HIGH (ref 70–99)
GLUCOSE SERPL-MCNC: 249 MG/DL — HIGH (ref 70–99)
HCT VFR BLD CALC: 36.1 % — LOW (ref 39–50)
HGB BLD-MCNC: 10.8 G/DL — LOW (ref 13–17)
MAGNESIUM SERPL-MCNC: 1.8 MG/DL — SIGNIFICANT CHANGE UP (ref 1.6–2.6)
MCHC RBC-ENTMCNC: 22.3 PG — LOW (ref 27–34)
MCHC RBC-ENTMCNC: 29.9 GM/DL — LOW (ref 32–36)
MCV RBC AUTO: 74.6 FL — LOW (ref 80–100)
NRBC # BLD: 0 /100 WBCS — SIGNIFICANT CHANGE UP (ref 0–0)
PHOSPHATE SERPL-MCNC: 2.7 MG/DL — SIGNIFICANT CHANGE UP (ref 2.5–4.5)
PLATELET # BLD AUTO: 287 K/UL — SIGNIFICANT CHANGE UP (ref 150–400)
POTASSIUM SERPL-MCNC: 4.5 MMOL/L — SIGNIFICANT CHANGE UP (ref 3.5–5.3)
POTASSIUM SERPL-SCNC: 4.5 MMOL/L — SIGNIFICANT CHANGE UP (ref 3.5–5.3)
PROT SERPL-MCNC: 5.9 G/DL — LOW (ref 6–8.3)
RBC # BLD: 4.84 M/UL — SIGNIFICANT CHANGE UP (ref 4.2–5.8)
RBC # FLD: 19 % — HIGH (ref 10.3–14.5)
SARS-COV-2 RNA SPEC QL NAA+PROBE: SIGNIFICANT CHANGE UP
SODIUM SERPL-SCNC: 137 MMOL/L — SIGNIFICANT CHANGE UP (ref 135–145)
WBC # BLD: 14.45 K/UL — HIGH (ref 3.8–10.5)
WBC # FLD AUTO: 14.45 K/UL — HIGH (ref 3.8–10.5)

## 2022-06-27 PROCEDURE — 93010 ELECTROCARDIOGRAM REPORT: CPT

## 2022-06-27 PROCEDURE — 71045 X-RAY EXAM CHEST 1 VIEW: CPT | Mod: 26

## 2022-06-27 RX ORDER — FUROSEMIDE 40 MG
40 TABLET ORAL EVERY 24 HOURS
Refills: 0 | Status: DISCONTINUED | OUTPATIENT
Start: 2022-06-28 | End: 2022-06-28

## 2022-06-27 RX ORDER — FUROSEMIDE 40 MG
20 TABLET ORAL ONCE
Refills: 0 | Status: COMPLETED | OUTPATIENT
Start: 2022-06-27 | End: 2022-06-27

## 2022-06-27 RX ORDER — SENNA PLUS 8.6 MG/1
1 TABLET ORAL EVERY 12 HOURS
Refills: 0 | Status: DISCONTINUED | OUTPATIENT
Start: 2022-06-27 | End: 2022-06-28

## 2022-06-27 RX ORDER — SODIUM HYPOCHLORITE 0.125 %
1 SOLUTION, NON-ORAL MISCELLANEOUS ONCE
Refills: 0 | Status: COMPLETED | OUTPATIENT
Start: 2022-06-27 | End: 2022-06-27

## 2022-06-27 RX ORDER — MAGNESIUM SULFATE 500 MG/ML
1 VIAL (ML) INJECTION ONCE
Refills: 0 | Status: COMPLETED | OUTPATIENT
Start: 2022-06-27 | End: 2022-06-27

## 2022-06-27 RX ORDER — INSULIN GLARGINE 100 [IU]/ML
14 INJECTION, SOLUTION SUBCUTANEOUS AT BEDTIME
Refills: 0 | Status: DISCONTINUED | OUTPATIENT
Start: 2022-06-27 | End: 2022-06-28

## 2022-06-27 RX ORDER — METOPROLOL TARTRATE 50 MG
25 TABLET ORAL EVERY 24 HOURS
Refills: 0 | Status: DISCONTINUED | OUTPATIENT
Start: 2022-06-27 | End: 2022-06-28

## 2022-06-27 RX ORDER — INSULIN LISPRO 100/ML
4 VIAL (ML) SUBCUTANEOUS
Refills: 0 | Status: DISCONTINUED | OUTPATIENT
Start: 2022-06-27 | End: 2022-06-28

## 2022-06-27 RX ADMIN — Medication 2.5 MILLIGRAM(S): at 02:11

## 2022-06-27 RX ADMIN — Medication 4: at 07:17

## 2022-06-27 RX ADMIN — OXYCODONE HYDROCHLORIDE 5 MILLIGRAM(S): 5 TABLET ORAL at 07:17

## 2022-06-27 RX ADMIN — HEPARIN SODIUM 5000 UNIT(S): 5000 INJECTION INTRAVENOUS; SUBCUTANEOUS at 14:01

## 2022-06-27 RX ADMIN — Medication 125 MICROGRAM(S): at 07:17

## 2022-06-27 RX ADMIN — HEPARIN SODIUM 5000 UNIT(S): 5000 INJECTION INTRAVENOUS; SUBCUTANEOUS at 22:31

## 2022-06-27 RX ADMIN — Medication 100 GRAM(S): at 12:39

## 2022-06-27 RX ADMIN — Medication 62.5 MILLIMOLE(S): at 14:02

## 2022-06-27 RX ADMIN — INSULIN GLARGINE 14 UNIT(S): 100 INJECTION, SOLUTION SUBCUTANEOUS at 22:32

## 2022-06-27 RX ADMIN — OXYCODONE HYDROCHLORIDE 5 MILLIGRAM(S): 5 TABLET ORAL at 08:30

## 2022-06-27 RX ADMIN — Medication 1 APPLICATION(S): at 21:46

## 2022-06-27 RX ADMIN — Medication 4: at 12:29

## 2022-06-27 RX ADMIN — Medication 25 MILLIGRAM(S): at 14:01

## 2022-06-27 RX ADMIN — HEPARIN SODIUM 5000 UNIT(S): 5000 INJECTION INTRAVENOUS; SUBCUTANEOUS at 05:42

## 2022-06-27 RX ADMIN — Medication 2.5 MILLIGRAM(S): at 07:17

## 2022-06-27 RX ADMIN — Medication 4: at 22:31

## 2022-06-27 RX ADMIN — PANTOPRAZOLE SODIUM 40 MILLIGRAM(S): 20 TABLET, DELAYED RELEASE ORAL at 22:31

## 2022-06-27 RX ADMIN — SENNA PLUS 1 TABLET(S): 8.6 TABLET ORAL at 22:31

## 2022-06-27 RX ADMIN — Medication 20 MILLIGRAM(S): at 12:38

## 2022-06-27 RX ADMIN — Medication 4: at 17:01

## 2022-06-27 RX ADMIN — CHLORHEXIDINE GLUCONATE 1 APPLICATION(S): 213 SOLUTION TOPICAL at 05:50

## 2022-06-27 RX ADMIN — Medication 81 MILLIGRAM(S): at 12:38

## 2022-06-27 RX ADMIN — Medication 25 MICROGRAM(S): at 05:42

## 2022-06-27 NOTE — PROGRESS NOTE ADULT - SUBJECTIVE AND OBJECTIVE BOX
INTERVAL HPI/OVERNIGHT EVENTS:    Patient is a 66y old  Male who presents with a chief complaint of K82.3     (23 Jun 2022 12:47)      Pt reports the following symptoms:    CONSTITUTIONAL:  Negative fever or chills, feels well, good appetite  EYES:  Negative  blurry vision or double vision  CARDIOVASCULAR:  Negative for chest pain or palpitations  RESPIRATORY:  Negative for cough, wheezing, or SOB   GASTROINTESTINAL:  Negative for nausea, vomiting, diarrhea, constipation, or abdominal pain  GENITOURINARY:  Negative frequency, urgency or dysuria  NEUROLOGIC:  No headache, confusion, dizziness, lightheadedness    MEDICATIONS  (STANDING):  aspirin  chewable 81 milliGRAM(s) Oral every 24 hours  chlorhexidine 2% Cloths 1 Application(s) Topical <User Schedule>  dextrose 5%. 1000 milliLiter(s) (50 mL/Hr) IV Continuous <Continuous>  dextrose 5%. 1000 milliLiter(s) (100 mL/Hr) IV Continuous <Continuous>  dextrose 5%. 1000 milliLiter(s) (50 mL/Hr) IV Continuous <Continuous>  dextrose 5%. 1000 milliLiter(s) (100 mL/Hr) IV Continuous <Continuous>  dextrose 50% Injectable 25 Gram(s) IV Push once  dextrose 50% Injectable 12.5 Gram(s) IV Push once  dextrose 50% Injectable 25 Gram(s) IV Push once  dextrose 50% Injectable 25 Gram(s) IV Push once  dextrose 50% Injectable 12.5 Gram(s) IV Push once  dextrose 50% Injectable 25 Gram(s) IV Push once  digoxin     Tablet 125 MICROGram(s) Oral every 24 hours  glucagon  Injectable 1 milliGRAM(s) IntraMuscular once  glucagon  Injectable 1 milliGRAM(s) IntraMuscular once  heparin   Injectable 5000 Unit(s) SubCutaneous every 8 hours  insulin lispro (ADMELOG) corrective regimen sliding scale   SubCutaneous Before meals and at bedtime  levothyroxine 25 MICROGram(s) Oral daily  metoprolol succinate ER 25 milliGRAM(s) Oral every 24 hours  pantoprazole  Injectable 40 milliGRAM(s) IV Push every 24 hours    MEDICATIONS  (PRN):  acetaminophen     Tablet .. 650 milliGRAM(s) Oral every 6 hours PRN Moderate Pain (4 - 6)  dextrose Oral Gel 15 Gram(s) Oral once PRN Blood Glucose LESS THAN 70 milliGRAM(s)/deciliter  dextrose Oral Gel 15 Gram(s) Oral once PRN Blood Glucose LESS THAN 70 milliGRAM(s)/deciliter  ondansetron Injectable 4 milliGRAM(s) IV Push every 6 hours PRN Nausea and/or Vomiting  oxyCODONE    IR 5 milliGRAM(s) Oral every 4 hours PRN Severe Pain (7 - 10)      PHYSICAL EXAM  Vital Signs Last 24 Hrs  T(C): 36.1 (27 Jun 2022 12:33), Max: 37.1 (26 Jun 2022 20:54)  T(F): 96.9 (27 Jun 2022 12:33), Max: 98.8 (26 Jun 2022 20:54)  HR: 83 (27 Jun 2022 12:33) (68 - 102)  BP: 116/58 (27 Jun 2022 12:33) (99/54 - 140/77)  BP(mean): --  RR: 17 (27 Jun 2022 12:40) (16 - 18)  SpO2: 97% (27 Jun 2022 12:40) (91% - 97%)    Constitutional: wn/wd in NAD.   HEENT: NCAT, MMM, OP clear, EOMI, , no proptosis or lid retraction  Neck: no thyromegaly or palpable thyroid nodules   Respiratory: lungs CTAB.  Cardiovascular: regular rhythm, normal S1 and S2, no audible murmurs, no peripheral edema  GI: soft, NT/ND, no masses/HSM appreciated.  Neurology: no tremors, DTR 2+  Skin: no visible rashes/lesions  Psychiatric: AAO x 3, normal affect/mood.    LABS:                        10.8   14.45 )-----------( 287      ( 27 Jun 2022 08:27 )             36.1     06-27    137  |  101  |  29<H>  ----------------------------<  249<H>  4.5   |  23  |  0.99    Ca    8.2<L>      27 Jun 2022 08:27  Phos  2.7     06-27  Mg     1.8     06-27    TPro  5.9<L>  /  Alb  2.8<L>  /  TBili  0.8  /  DBili  x   /  AST  21  /  ALT  52<H>  /  AlkPhos  124<H>  06-27        Thyroid Stimulating Hormone, Serum: 2.850 uIU/mL (01-08 @ 08:25)      HbA1C:         Insulin Sliding Scale requirements X 24 Hours:      RADIOLOGY & ADDITIONAL TESTS:      Assessment and Recommendation:   · Assessment	  66M w/ PMHx of CAD s/p MIDCAB/VERONICA 2018, AICD (2/2020), CHF (EF 15-20%) uncontrolled T2 DM, HLD, HTN, R TKA several years ago c/b recurrent PJI and MRSA bacteremia and multiple revisions AKA 1/2022 and history of acute cholecystitis s/p perc sudhir 2/28/20 (drain removed in late May 2020) c/b by cholecystocutaneous excision of cholecystocutaneous fistulous tract with Dr. King in 04/08/2021, presenting for elective resection of fistula tract. Now s/p takedown and cholecystectomy 6/22/22. Endocrine consulted for insulin pump management.    Type 2 DM  A1C 12%  Wt: 79kg BMI 23  Cr: MOISES 1.71    Medtronic 770g and Freestyle Cathleen  Settings at admission.  Basal - 3 units/hr.  ICR - 12M 4, 12P 3.5, 9P 6  ISF 25  Target 100  IOB 4     Problem/Recommendation - 1:  ·  Problem: Diabetes.   ·  Recommendation: Type 2 on insulin pump and CGM  Suspect his basal rate was increased for lack of meal bolusing.          If he is unable to manage insulin pump, would consider Lantus 20 units at bedtime and MISS before meals and at bedtime.     Problem/Recommendation - 2:  ·  Problem: R/O Diabetes.    INTERVAL HPI/OVERNIGHT EVENTS:    Patient is a 66y old  Male who presents with a chief complaint of K82.3  Insulin pump off, patient noted to be eating some food from outside however not a large amount. Blood sugars mildly elevated today, has been on sliding scale. Overall he states he feels much better.       Pt reports the following symptoms:    CONSTITUTIONAL:  Negative fever or chills, feels well, good appetite  EYES:  Negative  blurry vision or double vision  CARDIOVASCULAR:  Negative for chest pain or palpitations  RESPIRATORY:  Negative for cough, wheezing, or SOB   GASTROINTESTINAL:  Negative for nausea, vomiting, diarrhea, constipation, or abdominal pain  GENITOURINARY:  Negative frequency, urgency or dysuria  NEUROLOGIC:  No headache, confusion, dizziness, lightheadedness    MEDICATIONS  (STANDING):  aspirin  chewable 81 milliGRAM(s) Oral every 24 hours  chlorhexidine 2% Cloths 1 Application(s) Topical <User Schedule>  dextrose 5%. 1000 milliLiter(s) (50 mL/Hr) IV Continuous <Continuous>  dextrose 5%. 1000 milliLiter(s) (100 mL/Hr) IV Continuous <Continuous>  dextrose 5%. 1000 milliLiter(s) (50 mL/Hr) IV Continuous <Continuous>  dextrose 5%. 1000 milliLiter(s) (100 mL/Hr) IV Continuous <Continuous>  dextrose 50% Injectable 25 Gram(s) IV Push once  dextrose 50% Injectable 12.5 Gram(s) IV Push once  dextrose 50% Injectable 25 Gram(s) IV Push once  dextrose 50% Injectable 25 Gram(s) IV Push once  dextrose 50% Injectable 12.5 Gram(s) IV Push once  dextrose 50% Injectable 25 Gram(s) IV Push once  digoxin     Tablet 125 MICROGram(s) Oral every 24 hours  glucagon  Injectable 1 milliGRAM(s) IntraMuscular once  glucagon  Injectable 1 milliGRAM(s) IntraMuscular once  heparin   Injectable 5000 Unit(s) SubCutaneous every 8 hours  insulin lispro (ADMELOG) corrective regimen sliding scale   SubCutaneous Before meals and at bedtime  levothyroxine 25 MICROGram(s) Oral daily  metoprolol succinate ER 25 milliGRAM(s) Oral every 24 hours  pantoprazole  Injectable 40 milliGRAM(s) IV Push every 24 hours    MEDICATIONS  (PRN):  acetaminophen     Tablet .. 650 milliGRAM(s) Oral every 6 hours PRN Moderate Pain (4 - 6)  dextrose Oral Gel 15 Gram(s) Oral once PRN Blood Glucose LESS THAN 70 milliGRAM(s)/deciliter  dextrose Oral Gel 15 Gram(s) Oral once PRN Blood Glucose LESS THAN 70 milliGRAM(s)/deciliter  ondansetron Injectable 4 milliGRAM(s) IV Push every 6 hours PRN Nausea and/or Vomiting  oxyCODONE    IR 5 milliGRAM(s) Oral every 4 hours PRN Severe Pain (7 - 10)      PHYSICAL EXAM  Vital Signs Last 24 Hrs  T(C): 36.1 (27 Jun 2022 12:33), Max: 37.1 (26 Jun 2022 20:54)  T(F): 96.9 (27 Jun 2022 12:33), Max: 98.8 (26 Jun 2022 20:54)  HR: 83 (27 Jun 2022 12:33) (68 - 102)  BP: 116/58 (27 Jun 2022 12:33) (99/54 - 140/77)  BP(mean): --  RR: 17 (27 Jun 2022 12:40) (16 - 18)  SpO2: 97% (27 Jun 2022 12:40) (91% - 97%)    Physical Exam: GENERAL: NAD, Resting comfortably in bed  HEENT: NCAT, MMM, Normal conjunctiva, PERRL  RESP: Nonlabored breathing, No respiratory distress  CARD: Normal rate, Normal peripheral perfusion  GI: Soft, ND, NT, No guarding, No rebound tenderness; RUQ fistula with minimal yellow pus drainage over dressing; no surrounding erethyma, fluctance, tenderness, or skin break down; prior surgical scars   EXTREM: WWP, No edema, R AKA, left ankle wound in dressing   NEURO: AAOx3, No focal motor or sensory deficits    LABS:                        10.8   14.45 )-----------( 287      ( 27 Jun 2022 08:27 )             36.1     06-27    137  |  101  |  29<H>  ----------------------------<  249<H>  4.5   |  23  |  0.99    Ca    8.2<L>      27 Jun 2022 08:27  Phos  2.7     06-27  Mg     1.8     06-27    TPro  5.9<L>  /  Alb  2.8<L>  /  TBili  0.8  /  DBili  x   /  AST  21  /  ALT  52<H>  /  AlkPhos  124<H>  06-27        Thyroid Stimulating Hormone, Serum: 2.850 uIU/mL (01-08 @ 08:25)      HbA1C:         Insulin Sliding Scale requirements X 24 Hours:      RADIOLOGY & ADDITIONAL TESTS:      Assessment and Recommendation:   · Assessment	  66M w/ PMHx of CAD s/p MIDCAB/VERONICA 2018, AICD (2/2020), CHF (EF 15-20%) uncontrolled T2 DM, HLD, HTN, R TKA several years ago c/b recurrent PJI and MRSA bacteremia and multiple revisions AKA 1/2022 and history of acute cholecystitis s/p perc sudhir 2/28/20 (drain removed in late May 2020) c/b by cholecystocutaneous excision of cholecystocutaneous fistulous tract with Dr. King in 04/08/2021, presenting for elective resection of fistula tract. Now s/p takedown and cholecystectomy 6/22/22. Endocrine consulted for insulin pump management.    Type 2 DM  A1C 12%  Wt: 79kg BMI 23  Cr: MOISES 1.71    Medtronic 770g and Freestyle Cathleen  Settings at admission.  Basal - 3 units/hr.  ICR - 12M 4, 12P 3.5, 9P 6  ISF 25  Target 100  IOB 4     Problem/Recommendation - 1:  ·  Problem: Diabetes.   ·  Recommendation: Type 2 now currently off insulin pump   Start Lantus 14units at bedtime  Start 4 units lispro premeal  Continue moderate dose sliding scale before meals and at bedtime

## 2022-06-27 NOTE — PROGRESS NOTE ADULT - ASSESSMENT
67 yo M w pmh of CAD (s/p multiple surgeries), CHF (15-20% EF), uncontrolled DM, HLD, HTN and R TKA presented with a history of acute cholecystitis s/p perc sudhir in 2/2020 c/b cholecystocutaneous fistula planned for a takedown and cholecystectomy, pt is now s/p lap Excision of fistula tract, partial cholecystectomy on 6/22.    Pain/nausea control  Regular CC w/ ensures max 1 BID  HSQ/ASA, SCDs  AM Labs  F/u renal recs for MOISES/hyperK; monitor UOP  F/u endo recs for hyperglycemia   F/u PT   F/u EKG  F/u CXR  Lasix

## 2022-06-27 NOTE — PROGRESS NOTE ADULT - SUBJECTIVE AND OBJECTIVE BOX
INTERVAL HPI/OVERNIGHT EVENTS: changed wtd dressing, pt voiding spontaneously, refused straight cath    STATUS POST:  6/22: lap Excision of fistula tract, partial cholecystectomy    POST OPERATIVE DAY #: 5    SUBJECTIVE: Pt seen and examined at bedside this am by surgery team. -F/-BM, given tap water enema this am. Reports worsening cough, still on NC, ordered stat CXR/EKG, lasix 20mg IV. Overall tolerating diet, pain well controlled. Denies f/n/v/cp/sob.    MEDICATIONS  (STANDING):  aspirin  chewable 81 milliGRAM(s) Oral every 24 hours  chlorhexidine 2% Cloths 1 Application(s) Topical <User Schedule>  dextrose 5%. 1000 milliLiter(s) (50 mL/Hr) IV Continuous <Continuous>  dextrose 5%. 1000 milliLiter(s) (100 mL/Hr) IV Continuous <Continuous>  dextrose 5%. 1000 milliLiter(s) (50 mL/Hr) IV Continuous <Continuous>  dextrose 5%. 1000 milliLiter(s) (100 mL/Hr) IV Continuous <Continuous>  dextrose 50% Injectable 25 Gram(s) IV Push once  dextrose 50% Injectable 12.5 Gram(s) IV Push once  dextrose 50% Injectable 25 Gram(s) IV Push once  dextrose 50% Injectable 25 Gram(s) IV Push once  dextrose 50% Injectable 12.5 Gram(s) IV Push once  dextrose 50% Injectable 25 Gram(s) IV Push once  digoxin     Tablet 125 MICROGram(s) Oral every 24 hours  furosemide   Injectable 20 milliGRAM(s) IV Push once  glucagon  Injectable 1 milliGRAM(s) IntraMuscular once  glucagon  Injectable 1 milliGRAM(s) IntraMuscular once  heparin   Injectable 5000 Unit(s) SubCutaneous every 8 hours  insulin lispro (ADMELOG) corrective regimen sliding scale   SubCutaneous three times a day before meals  levothyroxine 25 MICROGram(s) Oral daily  magnesium sulfate  IVPB 1 Gram(s) IV Intermittent once  metoprolol succinate ER 25 milliGRAM(s) Oral every 24 hours  pantoprazole  Injectable 40 milliGRAM(s) IV Push every 24 hours  sodium phosphate IVPB 15 milliMole(s) IV Intermittent once    MEDICATIONS  (PRN):  acetaminophen     Tablet .. 650 milliGRAM(s) Oral every 6 hours PRN Moderate Pain (4 - 6)  dextrose Oral Gel 15 Gram(s) Oral once PRN Blood Glucose LESS THAN 70 milliGRAM(s)/deciliter  dextrose Oral Gel 15 Gram(s) Oral once PRN Blood Glucose LESS THAN 70 milliGRAM(s)/deciliter  ondansetron Injectable 4 milliGRAM(s) IV Push every 6 hours PRN Nausea and/or Vomiting  oxyCODONE    IR 5 milliGRAM(s) Oral every 4 hours PRN Severe Pain (7 - 10)      Vital Signs Last 24 Hrs  T(C): 36.5 (27 Jun 2022 05:44), Max: 37.1 (26 Jun 2022 20:54)  T(F): 97.7 (27 Jun 2022 05:44), Max: 98.8 (26 Jun 2022 20:54)  HR: 82 (27 Jun 2022 07:16) (68 - 102)  BP: 111/61 (27 Jun 2022 07:16) (99/54 - 140/77)  BP(mean): --  RR: 18 (27 Jun 2022 05:44) (15 - 18)  SpO2: 91% (27 Jun 2022 05:44) (91% - 94%)    PHYSICAL EXAM:    Constitutional: A&Ox3, NAD    Respiratory: non labored breathing, no respiratory distress    Cardiovascular: NSR, RRR    Gastrointestinal: abdomen soft, nd, appropriately ttp to surgical site. WTD packing changed. No rebound or guarding.    Extremities: wwp, no calf tenderness or edema. SCDs in place       I&O's Detail    26 Jun 2022 07:01  -  27 Jun 2022 07:00  --------------------------------------------------------  IN:    IV PiggyBack: 250 mL  Total IN: 250 mL    OUT:    Voided (mL): 1050 mL  Total OUT: 1050 mL    Total NET: -800 mL          LABS:                        10.8   14.45 )-----------( 287      ( 27 Jun 2022 08:27 )             36.1     06-27    137  |  101  |  29<H>  ----------------------------<  249<H>  4.5   |  23  |  0.99    Ca    8.2<L>      27 Jun 2022 08:27  Phos  2.7     06-27  Mg     1.8     06-27    TPro  5.9<L>  /  Alb  2.8<L>  /  TBili  0.8  /  DBili  x   /  AST  21  /  ALT  52<H>  /  AlkPhos  124<H>  06-27          RADIOLOGY & ADDITIONAL STUDIES:

## 2022-06-27 NOTE — CHART NOTE - NSCHARTNOTEFT_GEN_A_CORE
Admitting Diagnosis:   Patient is a 66y old  Male who presents with a chief complaint of K82.3  (23 Jun 2022 12:47)    PAST MEDICAL & SURGICAL HISTORY:  Status post percutaneous transluminal coronary angioplasty  2 stents  Atherosclerosis of coronary artery  CAD (coronary artery disease)  Osteoarthritis  HLD (hyperlipidemia)  Diabetes  on insulin pump  CHF (congestive heart failure)  EF ~ 25%  History of celiac disease  Diverticulitis  STEMI (ST elevation myocardial infarction)  Diabetic neuropathy  Hands and Feet  Anxiety and depression  Other postprocedural status  Fixation hardware in foot LEFT  Stented coronary artery  10/18 heart attack  INFERIOR WALL MI  S/P CABG x 1 2018  S/P TKR (total knee replacement), right  with infection Mrsa   per pt he was cleared from MRSA infection  Surgery, elective  right knee wound debridement  History of open reduction and internal fixation (ORIF) procedure  right hip  H/O shoulder surgery  right  AICD (automatic cardioverter/defibrillator) present  [facility]  Cholecystostomy care  drain inserted 2020 &amp; removed 4 months ago  History of tonsillectomy  History of hip replacement, total, right  Elective surgery  plastic surgery Left shin    Current Nutrition Order: CONSCHO with Ensure Max BID (300kcal, 60g pro)     PO Intake: Good (%) [   ]  Fair (50-75%) [ x ] Poor (<25%) [   ]    GI Issues:   Pt with constipation and abdominal discomfort 6/27   Last +BM 6/27  No n/v  No abdominal distention noted     Pain: Denies pain at time of assessment    Skin Integrity:  Freddie 17  No edema noted  Sx incision: abdomen   Pressure Injuries: L toe and heel unstageable, L shin stage III     Labs:   06-27    137  |  101  |  29<H>  ----------------------------<  249<H>  4.5   |  23  |  0.99    Ca    8.2<L>      27 Jun 2022 08:27  Phos  2.7     06-27  Mg     1.8     06-27    TPro  5.9<L>  /  Alb  2.8<L>  /  TBili  0.8  /  DBili  x   /  AST  21  /  ALT  52<H>  /  AlkPhos  124<H>  06-27    CAPILLARY BLOOD GLUCOSE  POCT Blood Glucose.: 241 mg/dL (27 Jun 2022 12:09)  POCT Blood Glucose.: 247 mg/dL (27 Jun 2022 06:45)  POCT Blood Glucose.: 232 mg/dL (26 Jun 2022 22:41)  POCT Blood Glucose.: 214 mg/dL (26 Jun 2022 17:37)    Medications:  MEDICATIONS  (STANDING):  aspirin  chewable 81 milliGRAM(s) Oral every 24 hours  chlorhexidine 2% Cloths 1 Application(s) Topical <User Schedule>  dextrose 5%. 1000 milliLiter(s) (50 mL/Hr) IV Continuous <Continuous>  dextrose 5%. 1000 milliLiter(s) (100 mL/Hr) IV Continuous <Continuous>  dextrose 5%. 1000 milliLiter(s) (50 mL/Hr) IV Continuous <Continuous>  dextrose 5%. 1000 milliLiter(s) (100 mL/Hr) IV Continuous <Continuous>  dextrose 50% Injectable 25 Gram(s) IV Push once  dextrose 50% Injectable 12.5 Gram(s) IV Push once  dextrose 50% Injectable 25 Gram(s) IV Push once  dextrose 50% Injectable 25 Gram(s) IV Push once  dextrose 50% Injectable 12.5 Gram(s) IV Push once  dextrose 50% Injectable 25 Gram(s) IV Push once  digoxin     Tablet 125 MICROGram(s) Oral every 24 hours  glucagon  Injectable 1 milliGRAM(s) IntraMuscular once  glucagon  Injectable 1 milliGRAM(s) IntraMuscular once  heparin   Injectable 5000 Unit(s) SubCutaneous every 8 hours  insulin lispro (ADMELOG) corrective regimen sliding scale   SubCutaneous Before meals and at bedtime  levothyroxine 25 MICROGram(s) Oral daily  metoprolol succinate ER 25 milliGRAM(s) Oral every 24 hours  pantoprazole  Injectable 40 milliGRAM(s) IV Push every 24 hours    MEDICATIONS  (PRN):  acetaminophen     Tablet .. 650 milliGRAM(s) Oral every 6 hours PRN Moderate Pain (4 - 6)  dextrose Oral Gel 15 Gram(s) Oral once PRN Blood Glucose LESS THAN 70 milliGRAM(s)/deciliter  dextrose Oral Gel 15 Gram(s) Oral once PRN Blood Glucose LESS THAN 70 milliGRAM(s)/deciliter  ondansetron Injectable 4 milliGRAM(s) IV Push every 6 hours PRN Nausea and/or Vomiting  oxyCODONE    IR 5 milliGRAM(s) Oral every 4 hours PRN Severe Pain (7 - 10)    Admitted Anthropometrics:   Ht: 6'1  Wt: 174lbs   BMI: 78.9  IBW: 163lbs (adjusted for AKA)  %IBW: 107    Weight Change: No new wts since admission. Please continue to trend wts weekly.     Nutrition Focused Physical Exam: Completed [ x ]  Not Pertinent [   ] - NFPE was unremarkable.     Estimated energy needs:   Kcal: 2367-2761kcal (based on 30-35kcal/kg)   Pro: 87-103g (based on 1.1-1.3g/kg)   %IBW: 107; ABW used to calculate estimated needs d/t pt being between 80 and 120% IBW; adjusted for wound healing/Pressure Injuries Stage II. Adjust protein needs pending improvement in renal function.    Subjective:  67 yo M w pmh of CAD (s/p multiple surgeries), CHF (15-20% EF), uncontrolled DM, HLD, HTN R TKA, followed by AK[facility], presented with a history of acute cholecystitis s/p perc sudhir in 2/2020 c/b cholecystocutaneous fistula planned for a takedown and and cholecystectomy 6/22/22. Pt now s/p partial cholecystectomy and excision of fistula tract of abdomen 6/22. Reports worsening cough, still on NC, ordered stat CXR/EKG, lasix 20mg IV. Overall tolerating diet, pain well controlled.     Pt seen at bedside for followup. Denies nausea/vomiting at this time. Pt with constipation 6/27, but has since resolved. Reports last bowel movement 6/27. Discussed current diet order CONSCHO with Ensure Max BID(300kcal, 60g pro); reinforced diet education regarding managing BG levels and adequate PO intake; amenable to education. However, pt notes poor protein intake while at hospital and repeatedly endorses high carbohydrate and saturated fat food choices at home. Reinforced concepts of diabetic diet and provided pt with outpatient nutrition contact information. Pt reports fair PO intake, able to complete 50% of meals. Is not enjoying the Ensure Max and requested glucerna. Recommend glucerna BID (440kcal, 20g pro). Of note, RD saw a pmhx of celiac in this patient's chart. RD confirmed with pt's wife that the celiac diagnosis was the result of an inaccurate test result and that the pt does not have celiac disease. Made aware RD remains available. RD to follow up per protocol. See nutrition recommendations below     Previous Nutrition Diagnosis: Increased nutrient needs RT hypermetabolic state for wound healing AEB pt s/p OR 6/22, pt with multiple pressure injuries >stage II.     Active [ x ]  Resolved [   ] - remains ongoing at this time     Goal: Pt to consistently meet >75% of EER during hospital stay     Recommendations:  1. Continue with CONSCHO diet  >>Monitor %PO Intake  >>Encourage intake, encourage protein intake   2. Change supplementation to Glucerna BID (440kcal, 20g pro)   3. Encourage adherence to diet education and reinforce as able   4. Pain and bowel regimen per team   5. Monitor lytes, replete prn. Monitor BG, trend renal indices.   6. Will continue to assess/honor preferences as able     Education: reinforced the importance of diabetic diet adherence, PO intake/protein intake. Pt amenable but could benefit from reinforcement.     Risk Level: High [ x ] Moderate [   ] Low [   ]

## 2022-06-27 NOTE — PROVIDER CONTACT NOTE (OTHER) - ACTION/TREATMENT ORDERED:
Notified PACHECO Anderson via SPOK
Fozia Anderson to replace packing and dressing
PACHECO Collado notified. PACHECO Rodriguez verbalized that she is going to assess pt.

## 2022-06-27 NOTE — PROVIDER CONTACT NOTE (OTHER) - ASSESSMENT
As per monitor tech, @0439 patient had 4 beats of Vtach, . Rounded on patient, he was asleep.
Upon assessment, pt asymptomatic. Denied chest pain, headache, dizziness, nausea and SOB. Pt verbalized feeling fine. VS within parameters. HR 77.
Upon assessment it was discovered that the patient's dressing and packing on his RUQ was missing.

## 2022-06-28 ENCOUNTER — TRANSCRIPTION ENCOUNTER (OUTPATIENT)
Age: 67
End: 2022-06-28

## 2022-06-28 VITALS
HEART RATE: 78 BPM | RESPIRATION RATE: 17 BRPM | TEMPERATURE: 98 F | SYSTOLIC BLOOD PRESSURE: 108 MMHG | DIASTOLIC BLOOD PRESSURE: 60 MMHG | OXYGEN SATURATION: 95 %

## 2022-06-28 DIAGNOSIS — Z95.1 PRESENCE OF AORTOCORONARY BYPASS GRAFT: ICD-10-CM

## 2022-06-28 DIAGNOSIS — I11.0 HYPERTENSIVE HEART DISEASE WITH HEART FAILURE: ICD-10-CM

## 2022-06-28 DIAGNOSIS — K81.9 CHOLECYSTITIS, UNSPECIFIED: ICD-10-CM

## 2022-06-28 DIAGNOSIS — F41.9 ANXIETY DISORDER, UNSPECIFIED: ICD-10-CM

## 2022-06-28 DIAGNOSIS — F32.A DEPRESSION, UNSPECIFIED: ICD-10-CM

## 2022-06-28 DIAGNOSIS — Z96.641 PRESENCE OF RIGHT ARTIFICIAL HIP JOINT: ICD-10-CM

## 2022-06-28 DIAGNOSIS — I25.10 ATHEROSCLEROTIC HEART DISEASE OF NATIVE CORONARY ARTERY WITHOUT ANGINA PECTORIS: ICD-10-CM

## 2022-06-28 DIAGNOSIS — Z79.4 LONG TERM (CURRENT) USE OF INSULIN: ICD-10-CM

## 2022-06-28 DIAGNOSIS — I25.2 OLD MYOCARDIAL INFARCTION: ICD-10-CM

## 2022-06-28 DIAGNOSIS — I50.9 HEART FAILURE, UNSPECIFIED: ICD-10-CM

## 2022-06-28 DIAGNOSIS — Z96.41 PRESENCE OF INSULIN PUMP (EXTERNAL) (INTERNAL): ICD-10-CM

## 2022-06-28 DIAGNOSIS — Z95.5 PRESENCE OF CORONARY ANGIOPLASTY IMPLANT AND GRAFT: ICD-10-CM

## 2022-06-28 DIAGNOSIS — E11.40 TYPE 2 DIABETES MELLITUS WITH DIABETIC NEUROPATHY, UNSPECIFIED: ICD-10-CM

## 2022-06-28 DIAGNOSIS — Z79.890 HORMONE REPLACEMENT THERAPY: ICD-10-CM

## 2022-06-28 DIAGNOSIS — Z95.810 PRESENCE OF AUTOMATIC (IMPLANTABLE) CARDIAC DEFIBRILLATOR: ICD-10-CM

## 2022-06-28 DIAGNOSIS — E78.5 HYPERLIPIDEMIA, UNSPECIFIED: ICD-10-CM

## 2022-06-28 DIAGNOSIS — E11.65 TYPE 2 DIABETES MELLITUS WITH HYPERGLYCEMIA: ICD-10-CM

## 2022-06-28 DIAGNOSIS — Z79.82 LONG TERM (CURRENT) USE OF ASPIRIN: ICD-10-CM

## 2022-06-28 DIAGNOSIS — Z96.651 PRESENCE OF RIGHT ARTIFICIAL KNEE JOINT: ICD-10-CM

## 2022-06-28 DIAGNOSIS — Z91.11 PATIENT'S NONCOMPLIANCE WITH DIETARY REGIMEN: ICD-10-CM

## 2022-06-28 DIAGNOSIS — Z88.8 ALLERGY STATUS TO OTHER DRUGS, MEDICAMENTS AND BIOLOGICAL SUBSTANCES STATUS: ICD-10-CM

## 2022-06-28 LAB
ALBUMIN SERPL ELPH-MCNC: 2.5 G/DL — LOW (ref 3.3–5)
ALP SERPL-CCNC: 113 U/L — SIGNIFICANT CHANGE UP (ref 40–120)
ALT FLD-CCNC: 44 U/L — SIGNIFICANT CHANGE UP (ref 10–45)
ANION GAP SERPL CALC-SCNC: 9 MMOL/L — SIGNIFICANT CHANGE UP (ref 5–17)
AST SERPL-CCNC: 20 U/L — SIGNIFICANT CHANGE UP (ref 10–40)
BILIRUB SERPL-MCNC: 0.7 MG/DL — SIGNIFICANT CHANGE UP (ref 0.2–1.2)
BUN SERPL-MCNC: 27 MG/DL — HIGH (ref 7–23)
CALCIUM SERPL-MCNC: 8.2 MG/DL — LOW (ref 8.4–10.5)
CHLORIDE SERPL-SCNC: 100 MMOL/L — SIGNIFICANT CHANGE UP (ref 96–108)
CO2 SERPL-SCNC: 26 MMOL/L — SIGNIFICANT CHANGE UP (ref 22–31)
CREAT SERPL-MCNC: 1.03 MG/DL — SIGNIFICANT CHANGE UP (ref 0.5–1.3)
EGFR: 81 ML/MIN/1.73M2 — SIGNIFICANT CHANGE UP
GLUCOSE BLDC GLUCOMTR-MCNC: 147 MG/DL — HIGH (ref 70–99)
GLUCOSE BLDC GLUCOMTR-MCNC: 187 MG/DL — HIGH (ref 70–99)
GLUCOSE SERPL-MCNC: 174 MG/DL — HIGH (ref 70–99)
HCT VFR BLD CALC: 34.9 % — LOW (ref 39–50)
HGB BLD-MCNC: 10.4 G/DL — LOW (ref 13–17)
MAGNESIUM SERPL-MCNC: 1.9 MG/DL — SIGNIFICANT CHANGE UP (ref 1.6–2.6)
MCHC RBC-ENTMCNC: 21.9 PG — LOW (ref 27–34)
MCHC RBC-ENTMCNC: 29.8 GM/DL — LOW (ref 32–36)
MCV RBC AUTO: 73.5 FL — LOW (ref 80–100)
NRBC # BLD: 0 /100 WBCS — SIGNIFICANT CHANGE UP (ref 0–0)
PHOSPHATE SERPL-MCNC: 2.6 MG/DL — SIGNIFICANT CHANGE UP (ref 2.5–4.5)
PLATELET # BLD AUTO: 275 K/UL — SIGNIFICANT CHANGE UP (ref 150–400)
POTASSIUM SERPL-MCNC: 4.2 MMOL/L — SIGNIFICANT CHANGE UP (ref 3.5–5.3)
POTASSIUM SERPL-SCNC: 4.2 MMOL/L — SIGNIFICANT CHANGE UP (ref 3.5–5.3)
PROT SERPL-MCNC: 5.7 G/DL — LOW (ref 6–8.3)
RBC # BLD: 4.75 M/UL — SIGNIFICANT CHANGE UP (ref 4.2–5.8)
RBC # FLD: 18.7 % — HIGH (ref 10.3–14.5)
SODIUM SERPL-SCNC: 135 MMOL/L — SIGNIFICANT CHANGE UP (ref 135–145)
WBC # BLD: 11.62 K/UL — HIGH (ref 3.8–10.5)
WBC # FLD AUTO: 11.62 K/UL — HIGH (ref 3.8–10.5)

## 2022-06-28 PROCEDURE — 84156 ASSAY OF PROTEIN URINE: CPT

## 2022-06-28 PROCEDURE — 84540 ASSAY OF URINE/UREA-N: CPT

## 2022-06-28 PROCEDURE — 36415 COLL VENOUS BLD VENIPUNCTURE: CPT

## 2022-06-28 PROCEDURE — 80053 COMPREHEN METABOLIC PANEL: CPT

## 2022-06-28 PROCEDURE — 88304 TISSUE EXAM BY PATHOLOGIST: CPT

## 2022-06-28 PROCEDURE — 85610 PROTHROMBIN TIME: CPT

## 2022-06-28 PROCEDURE — 84100 ASSAY OF PHOSPHORUS: CPT

## 2022-06-28 PROCEDURE — 81001 URINALYSIS AUTO W/SCOPE: CPT

## 2022-06-28 PROCEDURE — 83735 ASSAY OF MAGNESIUM: CPT

## 2022-06-28 PROCEDURE — 93005 ELECTROCARDIOGRAM TRACING: CPT

## 2022-06-28 PROCEDURE — 71045 X-RAY EXAM CHEST 1 VIEW: CPT

## 2022-06-28 PROCEDURE — 97161 PT EVAL LOW COMPLEX 20 MIN: CPT

## 2022-06-28 PROCEDURE — U0005: CPT

## 2022-06-28 PROCEDURE — 86850 RBC ANTIBODY SCREEN: CPT

## 2022-06-28 PROCEDURE — 82570 ASSAY OF URINE CREATININE: CPT

## 2022-06-28 PROCEDURE — 86901 BLOOD TYPING SEROLOGIC RH(D): CPT

## 2022-06-28 PROCEDURE — U0003: CPT

## 2022-06-28 PROCEDURE — 86900 BLOOD TYPING SEROLOGIC ABO: CPT

## 2022-06-28 PROCEDURE — 84300 ASSAY OF URINE SODIUM: CPT

## 2022-06-28 PROCEDURE — 84133 ASSAY OF URINE POTASSIUM: CPT

## 2022-06-28 PROCEDURE — 85027 COMPLETE CBC AUTOMATED: CPT

## 2022-06-28 PROCEDURE — C9399: CPT

## 2022-06-28 PROCEDURE — 80048 BASIC METABOLIC PNL TOTAL CA: CPT

## 2022-06-28 PROCEDURE — 82962 GLUCOSE BLOOD TEST: CPT

## 2022-06-28 PROCEDURE — 83935 ASSAY OF URINE OSMOLALITY: CPT

## 2022-06-28 RX ORDER — OXYCODONE HYDROCHLORIDE 5 MG/1
5 TABLET ORAL ONCE
Refills: 0 | Status: DISCONTINUED | OUTPATIENT
Start: 2022-06-28 | End: 2022-06-28

## 2022-06-28 RX ORDER — INSULIN GLARGINE 100 [IU]/ML
14 INJECTION, SOLUTION SUBCUTANEOUS
Qty: 280 | Refills: 0
Start: 2022-06-28 | End: 2022-07-17

## 2022-06-28 RX ORDER — SODIUM,POTASSIUM PHOSPHATES 278-250MG
1 POWDER IN PACKET (EA) ORAL ONCE
Refills: 0 | Status: COMPLETED | OUTPATIENT
Start: 2022-06-28 | End: 2022-06-28

## 2022-06-28 RX ORDER — INSULIN LISPRO 100/ML
4 VIAL (ML) SUBCUTANEOUS
Qty: 360 | Refills: 0
Start: 2022-06-28 | End: 2022-07-27

## 2022-06-28 RX ORDER — PRASUGREL 5 MG/1
10 TABLET, FILM COATED ORAL DAILY
Refills: 0 | Status: DISCONTINUED | OUTPATIENT
Start: 2022-06-28 | End: 2022-06-28

## 2022-06-28 RX ORDER — SODIUM HYPOCHLORITE 0.125 %
1 SOLUTION, NON-ORAL MISCELLANEOUS
Qty: 28 | Refills: 0
Start: 2022-06-28 | End: 2022-07-11

## 2022-06-28 RX ORDER — OXYCODONE HYDROCHLORIDE 5 MG/1
1 TABLET ORAL
Qty: 10 | Refills: 0
Start: 2022-06-28

## 2022-06-28 RX ADMIN — OXYCODONE HYDROCHLORIDE 5 MILLIGRAM(S): 5 TABLET ORAL at 07:02

## 2022-06-28 RX ADMIN — OXYCODONE HYDROCHLORIDE 5 MILLIGRAM(S): 5 TABLET ORAL at 06:06

## 2022-06-28 RX ADMIN — OXYCODONE HYDROCHLORIDE 5 MILLIGRAM(S): 5 TABLET ORAL at 02:49

## 2022-06-28 RX ADMIN — Medication 1 PACKET(S): at 11:54

## 2022-06-28 RX ADMIN — Medication 4 UNIT(S): at 12:18

## 2022-06-28 RX ADMIN — HEPARIN SODIUM 5000 UNIT(S): 5000 INJECTION INTRAVENOUS; SUBCUTANEOUS at 06:06

## 2022-06-28 RX ADMIN — Medication 2: at 12:17

## 2022-06-28 RX ADMIN — HEPARIN SODIUM 5000 UNIT(S): 5000 INJECTION INTRAVENOUS; SUBCUTANEOUS at 15:01

## 2022-06-28 RX ADMIN — PRASUGREL 10 MILLIGRAM(S): 5 TABLET, FILM COATED ORAL at 08:40

## 2022-06-28 RX ADMIN — Medication 4 UNIT(S): at 08:02

## 2022-06-28 RX ADMIN — Medication 125 MICROGRAM(S): at 06:06

## 2022-06-28 RX ADMIN — Medication 25 MICROGRAM(S): at 06:06

## 2022-06-28 RX ADMIN — OXYCODONE HYDROCHLORIDE 5 MILLIGRAM(S): 5 TABLET ORAL at 03:50

## 2022-06-28 RX ADMIN — Medication 62.5 MILLIMOLE(S): at 08:48

## 2022-06-28 RX ADMIN — Medication 25 MILLIGRAM(S): at 11:54

## 2022-06-28 RX ADMIN — Medication 81 MILLIGRAM(S): at 08:48

## 2022-06-28 RX ADMIN — SENNA PLUS 1 TABLET(S): 8.6 TABLET ORAL at 11:53

## 2022-06-28 NOTE — CHART NOTE - NSCHARTNOTEFT_GEN_A_CORE
Discussed with endocrinology outpatient recommendations.    Recommend continuing Lantus 14 units at bedtime and 4 units of lispro with meals - this regimen worked well in the hospital with last blood glucose level 147.    Advised patient to follow-up with home endocrinologist in a week.

## 2022-06-28 NOTE — DISCHARGE NOTE PROVIDER - NSDCFUADDAPPT_GEN_ALL_CORE_FT
Please follow up with Dr. King in 1 week. Call the office to schedule an appointment at [phone].    Please follow-up with your endocrinologist Dr. Jen Floyd in week. Call the office to schedule an appointment Please follow up with Dr. King in 1 week. Call the office to schedule an appointment at [phone].    Please follow-up with your endocrinologist Dr. Jen Floyd in week. Call the office to schedule an appointment    Please follow-up with your cardiologist in 1 week.

## 2022-06-28 NOTE — DISCHARGE NOTE PROVIDER - NSDCCPTREATMENT_GEN_ALL_CORE_FT
PRINCIPAL PROCEDURE  Procedure: Excision of fistula tract of abdomen  Findings and Treatment:       SECONDARY PROCEDURE  Procedure: Laparoscopic partial cholecystectomy  Findings and Treatment:

## 2022-06-28 NOTE — PROGRESS NOTE ADULT - ASSESSMENT
67 yo M w pmh of CAD (s/p multiple surgeries), CHF (15-20% EF), uncontrolled DM, HLD, HTN and R TKA presented with a history of acute cholecystitis s/p perc sudhir in 2/2020 c/b cholecystocutaneous fistula planned for a takedown and cholecystectomy, pt is now s/p lap Excision of fistula tract, partial cholecystectomy on 6/22.    Pain/nausea control  Regular CC w/ glucerna 1 BID  HSQ/ASA, SCDs  Home lasix   [facility]

## 2022-06-28 NOTE — DISCHARGE NOTE PROVIDER - CARE PROVIDER_API CALL
Margarito King)  Surgery  [address] [address]  Phone: [phone]  Fax: [phone]  Follow Up Time:     [place] [address]  Phone: [phone]  Fax: [phone]  Follow Up Time:

## 2022-06-28 NOTE — DISCHARGE NOTE PROVIDER - HOSPITAL COURSE
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 King in 04/08/2021; continued to have persistent output from fistula tract and now presents to the ED for patient presents for schedule cholecystectomy with Dr. King.   Patient was brought to the operating room on 6/22 for a laparoscopic excision of fistula tract, partial cholecystectomy. Patient tolerated the procedure well with no known complications. After the procedure patient was found to have K=5.9 and renal was consulted for MOISES likely secondary to urinary retention, however with hydration and resolving retention MOISES resolved. Endocrinology was consulted for uncontrolled blood sugar levels while using home insulin pump. However, sugar levels improved with a new insulin regimen.     Rest of his post operative course was unremarkable with advancement of diet, passing trial of void, and pain control. On day of discharge patient was hemodynamically stable and ready to go home with outpatient follow-up.

## 2022-06-28 NOTE — DISCHARGE NOTE PROVIDER - NSDCMRMEDTOKEN_GEN_ALL_CORE_FT
Admelog 100 units/mL injectable solution: 4 unit(s) injectable 3 times a day   aspirin 81 mg oral delayed release tablet: 1 tab(s) orally once a day  Dakins Quarter Strength 0.125% topical solution: Apply topically to affected area 2 times a day   digoxin 125 mcg (0.125 mg) oral tablet: 1 tab(s) orally once a day  DULoxetine: 90 milligram(s) orally once a day  insulin glargine 100 units/mL subcutaneous solution: 14 unit(s) subcutaneous once a day (at bedtime)  lancets: 1 application intravenous 4 times a day   Lasix 40 mg oral tablet: 1 tab(s) orally once a day, As Needed  levothyroxine 25 mcg (0.025 mg) oral tablet: 1 tab(s) orally once a day  metoprolol succinate 25 mg oral tablet, extended release: 1 tab(s) orally once a day  Multiple Vitamins oral tablet: 1 tab(s) orally once a day  ondansetron 4 mg oral tablet: 1/2 tab(s) orally every 8 hours, As Needed for nausea  pantoprazole 40 mg oral delayed release tablet: 1 tab(s) orally once a day (before a meal)  prasugrel 10 mg oral tablet: 1 tab(s) orally once a day  rosuvastatin 40 mg oral tablet: 1 tab(s) orally once a day   Admelog 100 units/mL injectable solution: 4 unit(s) injectable 3 times a day   Admelog SoloStar 100 units/mL injectable solution: 4 unit(s) injectable 3 times a day (with meals) MDD:3  aspirin 81 mg oral delayed release tablet: 1 tab(s) orally once a day  Dakins Quarter Strength 0.125% topical solution: Apply topically to affected area 2 times a day   digoxin 125 mcg (0.125 mg) oral tablet: 1 tab(s) orally once a day  DULoxetine: 90 milligram(s) orally once a day  insulin glargine 100 units/mL subcutaneous solution: 14 unit(s) subcutaneous once a day (at bedtime)  Insulin Pen Needles: 1 application intramuscularly 4 times a day   lancets: 1 application intravenous 4 times a day   Lantus Solostar Pen 100 units/mL subcutaneous solution: 14 unit(s) subcutaneous once a day (at bedtime)   Lasix 40 mg oral tablet: 1 tab(s) orally once a day, As Needed  levothyroxine 25 mcg (0.025 mg) oral tablet: 1 tab(s) orally once a day  metoprolol succinate 25 mg oral tablet, extended release: 1 tab(s) orally once a day  Multiple Vitamins oral tablet: 1 tab(s) orally once a day  ondansetron 4 mg oral tablet: 1/2 tab(s) orally every 8 hours, As Needed for nausea  oxyCODONE 5 mg oral tablet: 1 tab(s) orally every 6 hours, As Needed -for severe pain MDD:4 tablets a day maximum  pantoprazole 40 mg oral delayed release tablet: 1 tab(s) orally once a day (before a meal)  prasugrel 10 mg oral tablet: 1 tab(s) orally once a day  rosuvastatin 40 mg oral tablet: 1 tab(s) orally once a day

## 2022-06-28 NOTE — DISCHARGE NOTE NURSING/CASE MANAGEMENT/SOCIAL WORK - NSDCFUADDAPPT_GEN_ALL_CORE_FT
Please follow up with Dr. King in 1 week. Call the office to schedule an appointment at [phone].    Please follow-up with your endocrinologist Dr. Jen Floyd in week. Call the office to schedule an appointment

## 2022-06-28 NOTE — DISCHARGE NOTE PROVIDER - NSDCFUADDINST_GEN_ALL_CORE_FT
New Medication:  - Instead of using you home pump please take 12 units of Lantus at bedtime   - Please take 4 units of Admelog with your meals  - Continue checking you glucose at home and please follow-up with your endocrinologist in a week    General Discharge Instructions:  Please resume all regular home medications unless specifically advised not to take a particular medication. Also, please take any new medications as prescribed.  Please get plenty of rest, continue to ambulate several times per day, and drink adequate amounts of fluids. Avoid lifting weights greater than 5-10 lbs until you follow-up with your surgeon, who will instruct you further regarding activity restrictions.  Avoid driving or operating heavy machinery while taking pain medications.  Please follow-up with your surgeon and Primary Care Provider (PCP) as advised.  Incision Care:  *Please call your doctor or nurse practitioner if you have increased pain, swelling, redness, or drainage from the incision site.  *Avoid swimming and baths until your follow-up appointment.  *You may shower, and wash surgical incisions with a mild soap and warm water. Gently pat the area dry.    Incision care:  - Please pack the right incision with 4 x 4 gauze with Dakins solution and cover with 1 dry 4 x 4 gauze twice a day.   New Medication:  - Instead of using you home pump please take 12 units of Lantus at bedtime   - Please take 4 units of Admelog with your meals  - Continue checking you glucose at home and please follow-up with your endocrinologist in a week    General Discharge Instructions:  Please resume all regular home medications except for your insulin pump. Also, please take any new medications as prescribed.  Please get plenty of rest, continue to ambulate several times per day, and drink adequate amounts of fluids. Avoid lifting weights greater than 5-10 lbs until you follow-up with your surgeon, who will instruct you further regarding activity restrictions.  Avoid driving or operating heavy machinery while taking pain medications.  Please follow-up with your surgeon and Primary Care Provider (PCP) as advised.  Incision Care:  *Please call your doctor or nurse practitioner if you have increased pain, swelling, redness, or drainage from the incision site.  *Avoid swimming and baths until your follow-up appointment.  *You may shower, and wash surgical incisions with a mild soap and warm water. Gently pat the area dry.    Incision care:  - Please pack the right incision with 4 x 4 gauze with Dakins solution and cover with 1 dry 4 x 4 gauze twice a day.

## 2022-06-28 NOTE — PROGRESS NOTE ADULT - SUBJECTIVE AND OBJECTIVE BOX
INTERVAL HPI/OVERNIGHT EVENTS:    Patient is a 66y old  Male who presents with a chief complaint of K82.3  Patient feeling better, eating well, ready to go home. States he will resume his insulin pump at home.       Pt reports the following symptoms:    CONSTITUTIONAL:  Negative fever or chills, feels well, good appetite  EYES:  Negative  blurry vision or double vision  CARDIOVASCULAR:  Negative for chest pain or palpitations  RESPIRATORY:  Negative for cough, wheezing, or SOB   GASTROINTESTINAL:  Negative for nausea, vomiting, diarrhea, constipation, or abdominal pain  GENITOURINARY:  Negative frequency, urgency or dysuria  NEUROLOGIC:  No headache, confusion, dizziness, lightheadedness    MEDICATIONS  (STANDING):  aspirin  chewable 81 milliGRAM(s) Oral every 24 hours  chlorhexidine 2% Cloths 1 Application(s) Topical <User Schedule>  dextrose 5%. 1000 milliLiter(s) (50 mL/Hr) IV Continuous <Continuous>  dextrose 5%. 1000 milliLiter(s) (100 mL/Hr) IV Continuous <Continuous>  dextrose 5%. 1000 milliLiter(s) (50 mL/Hr) IV Continuous <Continuous>  dextrose 5%. 1000 milliLiter(s) (100 mL/Hr) IV Continuous <Continuous>  dextrose 50% Injectable 25 Gram(s) IV Push once  dextrose 50% Injectable 12.5 Gram(s) IV Push once  dextrose 50% Injectable 25 Gram(s) IV Push once  dextrose 50% Injectable 25 Gram(s) IV Push once  dextrose 50% Injectable 12.5 Gram(s) IV Push once  dextrose 50% Injectable 25 Gram(s) IV Push once  digoxin     Tablet 125 MICROGram(s) Oral every 24 hours  furosemide    Tablet 40 milliGRAM(s) Oral every 24 hours  glucagon  Injectable 1 milliGRAM(s) IntraMuscular once  glucagon  Injectable 1 milliGRAM(s) IntraMuscular once  heparin   Injectable 5000 Unit(s) SubCutaneous every 8 hours  insulin glargine Injectable (LANTUS) 14 Unit(s) SubCutaneous at bedtime  insulin lispro (ADMELOG) corrective regimen sliding scale   SubCutaneous Before meals and at bedtime  insulin lispro Injectable (ADMELOG) 4 Unit(s) SubCutaneous three times a day before meals  levothyroxine 25 MICROGram(s) Oral daily  metoprolol succinate ER 25 milliGRAM(s) Oral every 24 hours  pantoprazole  Injectable 40 milliGRAM(s) IV Push every 24 hours  prasugrel 10 milliGRAM(s) Oral daily  senna 1 Tablet(s) Oral every 12 hours    MEDICATIONS  (PRN):  acetaminophen     Tablet .. 650 milliGRAM(s) Oral every 6 hours PRN Moderate Pain (4 - 6)  dextrose Oral Gel 15 Gram(s) Oral once PRN Blood Glucose LESS THAN 70 milliGRAM(s)/deciliter  dextrose Oral Gel 15 Gram(s) Oral once PRN Blood Glucose LESS THAN 70 milliGRAM(s)/deciliter  ondansetron Injectable 4 milliGRAM(s) IV Push every 6 hours PRN Nausea and/or Vomiting  oxyCODONE    IR 5 milliGRAM(s) Oral every 4 hours PRN Severe Pain (7 - 10)      PHYSICAL EXAM  Vital Signs Last 24 Hrs  T(C): 36.4 (28 Jun 2022 13:42), Max: 36.9 (27 Jun 2022 20:55)  T(F): 97.6 (28 Jun 2022 13:42), Max: 98.5 (27 Jun 2022 20:55)  HR: 74 (28 Jun 2022 13:42) (70 - 78)  BP: 103/55 (28 Jun 2022 13:42) (100/62 - 111/71)  BP(mean): --  RR: 18 (28 Jun 2022 13:42) (18 - 18)  SpO2: 97% (28 Jun 2022 13:42) (95% - 98%)    Physical Exam: GENERAL: NAD, Resting comfortably in bed  HEENT: NCAT, MMM, Normal conjunctiva, PERRL  RESP: Nonlabored breathing, No respiratory distress  CARD: Normal rate, Normal peripheral perfusion  GI: Soft, ND, NT, No guarding, No rebound tenderness; RUQ fistula with minimal yellow pus drainage over dressing; no surrounding erethyma, fluctance, tenderness, or skin break down; prior surgical scars   EXTREM: WWP, No edema, R AKA, left ankle wound in dressing   NEURO: AAOx3, No focal motor or sensory deficits    LABS:                        10.4   11.62 )-----------( 275      ( 28 Jun 2022 06:50 )             34.9     06-28    135  |  100  |  27<H>  ----------------------------<  174<H>  4.2   |  26  |  1.03    Ca    8.2<L>      28 Jun 2022 06:50  Phos  2.6     06-28  Mg     1.9     06-28    TPro  5.7<L>  /  Alb  2.5<L>  /  TBili  0.7  /  DBili  x   /  AST  20  /  ALT  44  /  AlkPhos  113  06-28        Thyroid Stimulating Hormone, Serum: 2.850 uIU/mL (01-08 @ 08:25)      HbA1C:         Insulin Sliding Scale requirements X 24 Hours:      RADIOLOGY & ADDITIONAL TESTS:      Assessment and Recommendation:   · Assessment	  66M w/ PMHx of CAD s/p MIDCAB/VERONICA 2018, AICD (2/2020), CHF (EF 15-20%) uncontrolled T2 DM, HLD, HTN, R TKA several years ago c/b recurrent PJI and MRSA bacteremia and multiple revisions AKA 1/2022 and history of acute cholecystitis s/p perc sudhir 2/28/20 (drain removed in late May 2020) c/b by cholecystocutaneous excision of cholecystocutaneous fistulous tract with Dr. King in 04/08/2021, presenting for elective resection of fistula tract. Now s/p takedown and cholecystectomy 6/22/22. Endocrine consulted for insulin pump management.    Type 2 DM  A1C 12%  Wt: 79kg BMI 23  Cr: MOISES 1.71    Medtronic 770g and Freestyle Cathleen  Settings at admission.  Basal - 3 units/hr.  ICR - 12M 4, 12P 3.5, 9P 6  ISF 25  Target 100  IOB 4     Problem/Recommendation - 1:  ·  Problem: Diabetes.   ·  Recommendation: Type 2 now currently off insulin pump   Start Lantus 14units at bedtime  Start 4 units lispro premeal  Continue moderate dose sliding scale before meals and at bedtime    If discharging on Basal Bolus insulin, can keep same regimen, Lantus 14 abd Lispro 4-5units with meals.  If resuming insulin pump would advised patient to resume pump with basal 2U/hr at 10pm tonight.

## 2022-06-28 NOTE — PROGRESS NOTE ADULT - ATTENDING COMMENTS
as noted. continue w wound care and glucose control
I:   Stable creatinine. Cr 1.23  A: Improved MOISES.  P: Follow SCr.
The pump was off during the weekend because the patient inadvertently pulled off the infusion set. He was only on SS for the weekend. Yesterday glucoses were [phone] and today [phone]. I agree with treating with 14 units Lantus Insulin and  4 units pre-meal Insulin.
The patient's glucose levels were 247-250 last night and today 147-187. I agree with treating with Insulin pump 2 units/hr when he goes home with meal boluses.

## 2022-06-28 NOTE — PROGRESS NOTE ADULT - PROVIDER SPECIALTY LIST ADULT
Endocrinology
Endocrinology
Nephrology
Surgery
Surgery
Nephrology
Surgery

## 2022-06-28 NOTE — DISCHARGE NOTE NURSING/CASE MANAGEMENT/SOCIAL WORK - PATIENT PORTAL LINK FT
You can access the FollowMyHealth Patient Portal offered by [facility] by registering at the following website: [email or web address]. By joining [facility]’s FollowMyHealth portal, you will also be able to view your health information using other applications (apps) compatible with our system.

## 2022-06-28 NOTE — DISCHARGE NOTE PROVIDER - NSDCCPCAREPLAN_GEN_ALL_CORE_FT
PRINCIPAL DISCHARGE DIAGNOSIS  Diagnosis: Cholecystocutaneous fistula  Assessment and Plan of Treatment:        PRINCIPAL DISCHARGE DIAGNOSIS  Diagnosis: Cholecystocutaneous fistula  Assessment and Plan of Treatment: Incision care:  - Please pack the right incision with 4 x 4 gauze with Dakins solution and cover with 1 dry 4 x 4 gauze twice a day.

## 2022-06-28 NOTE — DISCHARGE NOTE NURSING/CASE MANAGEMENT/SOCIAL WORK - NSDCPEFALRISK_GEN_ALL_CORE
For information on Fall & Injury Prevention, visit: [email or web address] [place]  [email or web address] [place]  https://www.cdc.gov/steadi/patient.html

## 2022-06-28 NOTE — PROGRESS NOTE ADULT - SUBJECTIVE AND OBJECTIVE BOX
STATUS POST:       SUBJECTIVE: Patient seen and examined bedside by chief resident.    aspirin  chewable 81 milliGRAM(s) Oral every 24 hours  digoxin     Tablet 125 MICROGram(s) Oral every 24 hours  furosemide    Tablet 40 milliGRAM(s) Oral every 24 hours  heparin   Injectable 5000 Unit(s) SubCutaneous every 8 hours  metoprolol succinate ER 25 milliGRAM(s) Oral every 24 hours      Vital Signs Last 24 Hrs  T(C): 36.6 (28 Jun 2022 00:15), Max: 36.9 (27 Jun 2022 20:55)  T(F): 97.9 (28 Jun 2022 00:15), Max: 98.5 (27 Jun 2022 20:55)  HR: 78 (28 Jun 2022 00:15) (69 - 83)  BP: 111/71 (28 Jun 2022 00:15) (97/56 - 116/58)  BP(mean): --  RR: 18 (28 Jun 2022 00:15) (17 - 18)  SpO2: 97% (28 Jun 2022 00:15) (91% - 98%)  I&O's Detail    26 Jun 2022 07:01  -  27 Jun 2022 07:00  --------------------------------------------------------  IN:    IV PiggyBack: 250 mL  Total IN: 250 mL    OUT:    Voided (mL): 1050 mL  Total OUT: 1050 mL    Total NET: -800 mL      27 Jun 2022 07:01  -  28 Jun 2022 06:13  --------------------------------------------------------  IN:    IV PiggyBack: 100 mL    IV PiggyBack: 250 mL    Oral Fluid: 1180 mL  Total IN: 1530 mL    OUT:    Voided (mL): 2300 mL  Total OUT: 2300 mL    Total NET: -770 mL          General: NAD, resting comfortably in bed  C/V: NSR  Pulm: Nonlabored breathing, no respiratory distress  Abd: soft, NT/ND.  Extrem: WWP, no edema, SCDs in place        LABS:                        10.8   14.45 )-----------( 287      ( 27 Jun 2022 08:27 )             36.1     06-27    137  |  101  |  29<H>  ----------------------------<  249<H>  4.5   |  23  |  0.99    Ca    8.2<L>      27 Jun 2022 08:27  Phos  2.7     06-27  Mg     1.8     06-27    TPro  5.9<L>  /  Alb  2.8<L>  /  TBili  0.8  /  DBili  x   /  AST  21  /  ALT  52<H>  /  AlkPhos  124<H>  06-27          RADIOLOGY & ADDITIONAL STUDIES:   STATUS POST:  6/22: lap Excision of fistula tract, partial cholecystectomy     SUBJECTIVE: Patient seen and examined at bedside with chief resident. Patient had no acute events overnight.  He is passing flatus and last bowel movement was yesterday. Tolerating regular diet without nausea and vomiting. Dressing changed at bedside with Dakins solution. Denies f/c, CP, SOB, dysuria, hematuria, weakness or pain in extremities.    aspirin  chewable 81 milliGRAM(s) Oral every 24 hours  digoxin     Tablet 125 MICROGram(s) Oral every 24 hours  furosemide    Tablet 40 milliGRAM(s) Oral every 24 hours  heparin   Injectable 5000 Unit(s) SubCutaneous every 8 hours  metoprolol succinate ER 25 milliGRAM(s) Oral every 24 hours      Vital Signs Last 24 Hrs  T(C): 36.6 (28 Jun 2022 00:15), Max: 36.9 (27 Jun 2022 20:55)  T(F): 97.9 (28 Jun 2022 00:15), Max: 98.5 (27 Jun 2022 20:55)  HR: 78 (28 Jun 2022 00:15) (69 - 83)  BP: 111/71 (28 Jun 2022 00:15) (97/56 - 116/58)  BP(mean): --  RR: 18 (28 Jun 2022 00:15) (17 - 18)  SpO2: 97% (28 Jun 2022 00:15) (91% - 98%)  I&O's Detail    26 Jun 2022 07:01  -  27 Jun 2022 07:00  --------------------------------------------------------  IN:    IV PiggyBack: 250 mL  Total IN: 250 mL    OUT:    Voided (mL): 1050 mL  Total OUT: 1050 mL    Total NET: -800 mL      27 Jun 2022 07:01  -  28 Jun 2022 06:13  --------------------------------------------------------  IN:    IV PiggyBack: 100 mL    IV PiggyBack: 250 mL    Oral Fluid: 1180 mL  Total IN: 1530 mL    OUT:    Voided (mL): 2300 mL  Total OUT: 2300 mL    Total NET: -770 mL    General: NAD, resting comfortably in bed  C/V: NSR  Pulm: Nonlabored breathing, no respiratory distress  Abd: abdomen soft, nd, appropriately ttp to surgical site. WTD packing changed. No rebound or guarding.  Extrem: WWP, no edema, SCDs in place      LABS:                        10.8   14.45 )-----------( 287      ( 27 Jun 2022 08:27 )             36.1     06-27    137  |  101  |  29<H>  ----------------------------<  249<H>  4.5   |  23  |  0.99    Ca    8.2<L>      27 Jun 2022 08:27  Phos  2.7     06-27  Mg     1.8     06-27    TPro  5.9<L>  /  Alb  2.8<L>  /  TBili  0.8  /  DBili  x   /  AST  21  /  ALT  52<H>  /  AlkPhos  124<H>  06-27          RADIOLOGY & ADDITIONAL STUDIES:

## 2022-06-30 PROBLEM — E11.40 TYPE 2 DIABETES MELLITUS WITH DIABETIC NEUROPATHY, UNSPECIFIED: Chronic | Status: ACTIVE | Noted: 2020-01-15

## 2022-07-01 ENCOUNTER — RX RENEWAL (OUTPATIENT)
Age: 67
End: 2022-07-01

## 2022-07-01 LAB — SURGICAL PATHOLOGY STUDY: SIGNIFICANT CHANGE UP

## 2022-07-05 ENCOUNTER — APPOINTMENT (OUTPATIENT)
Dept: SURGICAL ONCOLOGY | Facility: CLINIC | Age: 67
End: 2022-07-05

## 2022-07-05 DIAGNOSIS — Z79.899 OTHER LONG TERM (CURRENT) DRUG THERAPY: ICD-10-CM

## 2022-07-05 DIAGNOSIS — Z95.810 PRESENCE OF AUTOMATIC (IMPLANTABLE) CARDIAC DEFIBRILLATOR: ICD-10-CM

## 2022-07-05 DIAGNOSIS — Z99.3 DEPENDENCE ON WHEELCHAIR: ICD-10-CM

## 2022-07-05 DIAGNOSIS — I11.0 HYPERTENSIVE HEART DISEASE WITH HEART FAILURE: ICD-10-CM

## 2022-07-05 DIAGNOSIS — E11.65 TYPE 2 DIABETES MELLITUS WITH HYPERGLYCEMIA: ICD-10-CM

## 2022-07-05 DIAGNOSIS — F41.9 ANXIETY DISORDER, UNSPECIFIED: ICD-10-CM

## 2022-07-05 DIAGNOSIS — Z95.5 PRESENCE OF CORONARY ANGIOPLASTY IMPLANT AND GRAFT: ICD-10-CM

## 2022-07-05 DIAGNOSIS — Z79.02 LONG TERM (CURRENT) USE OF ANTITHROMBOTICS/ANTIPLATELETS: ICD-10-CM

## 2022-07-05 DIAGNOSIS — I50.22 CHRONIC SYSTOLIC (CONGESTIVE) HEART FAILURE: ICD-10-CM

## 2022-07-05 DIAGNOSIS — N17.9 ACUTE KIDNEY FAILURE, UNSPECIFIED: ICD-10-CM

## 2022-07-05 DIAGNOSIS — E11.40 TYPE 2 DIABETES MELLITUS WITH DIABETIC NEUROPATHY, UNSPECIFIED: ICD-10-CM

## 2022-07-05 DIAGNOSIS — K81.1 CHRONIC CHOLECYSTITIS: ICD-10-CM

## 2022-07-05 DIAGNOSIS — Z88.8 ALLERGY STATUS TO OTHER DRUGS, MEDICAMENTS AND BIOLOGICAL SUBSTANCES STATUS: ICD-10-CM

## 2022-07-05 DIAGNOSIS — Z96.41 PRESENCE OF INSULIN PUMP (EXTERNAL) (INTERNAL): ICD-10-CM

## 2022-07-05 DIAGNOSIS — Z20.822 CONTACT WITH AND (SUSPECTED) EXPOSURE TO COVID-19: ICD-10-CM

## 2022-07-05 DIAGNOSIS — F32.A DEPRESSION, UNSPECIFIED: ICD-10-CM

## 2022-07-05 DIAGNOSIS — K82.3 FISTULA OF GALLBLADDER: ICD-10-CM

## 2022-07-05 DIAGNOSIS — E78.5 HYPERLIPIDEMIA, UNSPECIFIED: ICD-10-CM

## 2022-07-05 DIAGNOSIS — E87.5 HYPERKALEMIA: ICD-10-CM

## 2022-07-05 DIAGNOSIS — Z79.82 LONG TERM (CURRENT) USE OF ASPIRIN: ICD-10-CM

## 2022-07-05 DIAGNOSIS — R33.9 RETENTION OF URINE, UNSPECIFIED: ICD-10-CM

## 2022-07-05 DIAGNOSIS — I25.2 OLD MYOCARDIAL INFARCTION: ICD-10-CM

## 2022-07-05 DIAGNOSIS — I25.10 ATHEROSCLEROTIC HEART DISEASE OF NATIVE CORONARY ARTERY WITHOUT ANGINA PECTORIS: ICD-10-CM

## 2022-07-05 DIAGNOSIS — D64.9 ANEMIA, UNSPECIFIED: ICD-10-CM

## 2022-07-05 DIAGNOSIS — Z95.1 PRESENCE OF AORTOCORONARY BYPASS GRAFT: ICD-10-CM

## 2022-07-05 DIAGNOSIS — Z96.641 PRESENCE OF RIGHT ARTIFICIAL HIP JOINT: ICD-10-CM

## 2022-07-05 DIAGNOSIS — Z86.14 PERSONAL HISTORY OF METHICILLIN RESISTANT STAPHYLOCOCCUS AUREUS INFECTION: ICD-10-CM

## 2022-07-05 DIAGNOSIS — L89.894 PRESSURE ULCER OF OTHER SITE, STAGE 4: ICD-10-CM

## 2022-07-05 DIAGNOSIS — Z79.4 LONG TERM (CURRENT) USE OF INSULIN: ICD-10-CM

## 2022-07-05 DIAGNOSIS — E11.649 TYPE 2 DIABETES MELLITUS WITH HYPOGLYCEMIA WITHOUT COMA: ICD-10-CM

## 2022-07-05 DIAGNOSIS — Z89.611 ACQUIRED ABSENCE OF RIGHT LEG ABOVE KNEE: ICD-10-CM

## 2022-07-06 ENCOUNTER — LABORATORY RESULT (OUTPATIENT)
Age: 67
End: 2022-07-06

## 2022-07-06 RX ORDER — OXYCODONE HYDROCHLORIDE 5 MG/1
1 TABLET ORAL
Qty: 0 | Refills: 0 | DISCHARGE
Start: 2022-07-06

## 2022-07-07 LAB
ALBUMIN SERPL ELPH-MCNC: 3.6 G/DL
ALP BLD-CCNC: 138 U/L
ALT SERPL-CCNC: 25 U/L
ANION GAP SERPL CALC-SCNC: 11 MMOL/L
AST SERPL-CCNC: 17 U/L
BASOPHILS # BLD AUTO: 0.09 K/UL
BASOPHILS NFR BLD AUTO: 0.9 %
BILIRUB SERPL-MCNC: 0.6 MG/DL
BUN SERPL-MCNC: 18 MG/DL
CALCIUM SERPL-MCNC: 8.8 MG/DL
CHLORIDE SERPL-SCNC: 99 MMOL/L
CHOLEST SERPL-MCNC: 85 MG/DL
CO2 SERPL-SCNC: 27 MMOL/L
CREAT SERPL-MCNC: 1.1 MG/DL
EGFR: 74 ML/MIN/1.73M2
EOSINOPHIL # BLD AUTO: 0.3 K/UL
EOSINOPHIL NFR BLD AUTO: 3.1 %
ESTIMATED AVERAGE GLUCOSE: 286 MG/DL
FRUCTOSAMINE SERPL-MCNC: 368 UMOL/L
GLUCOSE SERPL-MCNC: 282 MG/DL
HBA1C MFR BLD HPLC: 11.6 %
HCT VFR BLD CALC: 40.4 %
HDLC SERPL-MCNC: 26 MG/DL
HGB BLD-MCNC: 11.6 G/DL
IMM GRANULOCYTES NFR BLD AUTO: 0.3 %
LDLC SERPL CALC-MCNC: 44 MG/DL
LYMPHOCYTES # BLD AUTO: 1.31 K/UL
LYMPHOCYTES NFR BLD AUTO: 13.5 %
MAN DIFF?: NORMAL
MCHC RBC-ENTMCNC: 22.2 PG
MCHC RBC-ENTMCNC: 28.7 GM/DL
MCV RBC AUTO: 77.4 FL
MONOCYTES # BLD AUTO: 0.9 K/UL
MONOCYTES NFR BLD AUTO: 9.3 %
NEUTROPHILS # BLD AUTO: 7.08 K/UL
NEUTROPHILS NFR BLD AUTO: 72.9 %
NONHDLC SERPL-MCNC: 59 MG/DL
PLATELET # BLD AUTO: 459 K/UL
POTASSIUM SERPL-SCNC: 5 MMOL/L
PROT SERPL-MCNC: 6.6 G/DL
RBC # BLD: 5.22 M/UL
RBC # FLD: 19.9 %
SODIUM SERPL-SCNC: 137 MMOL/L
T4 FREE SERPL-MCNC: 1.2 NG/DL
TRIGL SERPL-MCNC: 74 MG/DL
TSH SERPL-ACNC: 4.03 UIU/ML
WBC # FLD AUTO: 9.71 K/UL

## 2022-07-11 ENCOUNTER — APPOINTMENT (OUTPATIENT)
Dept: ENDOCRINOLOGY | Facility: CLINIC | Age: 67
End: 2022-07-11

## 2022-07-12 ENCOUNTER — APPOINTMENT (OUTPATIENT)
Dept: SURGICAL ONCOLOGY | Facility: CLINIC | Age: 67
End: 2022-07-12

## 2022-07-12 VITALS
DIASTOLIC BLOOD PRESSURE: 68 MMHG | HEART RATE: 77 BPM | OXYGEN SATURATION: 98 % | SYSTOLIC BLOOD PRESSURE: 112 MMHG | TEMPERATURE: 97.3 F | HEIGHT: 73 IN | BODY MASS INDEX: 21.2 KG/M2 | WEIGHT: 160 LBS

## 2022-07-12 DIAGNOSIS — Z09 ENCOUNTER FOR FOLLOW-UP EXAMINATION AFTER COMPLETED TREATMENT FOR CONDITIONS OTHER THAN MALIGNANT NEOPLASM: ICD-10-CM

## 2022-07-12 PROCEDURE — 99024 POSTOP FOLLOW-UP VISIT: CPT

## 2022-07-12 NOTE — PHYSICAL EXAM
[Normal] : well developed, well nourished, in no acute distress [de-identified] : soft, non tender, surgical sites are clean, wounds granulating

## 2022-07-12 NOTE — ASSESSMENT
[FreeTextEntry1] : I) normal postop\par \par P) Wounds are healing but slowly. Will continue w local wound care at home. We will see him again in 4 months.sooner should the need arise.\par \par Margarito King MD\par \par Chief Surgical Oncology\par Multidisciplinary GI cancer program\par [facility]\par [facility]\par \par Professor Surgery\par [employer]\par

## 2022-07-12 NOTE — HISTORY OF PRESENT ILLNESS
[FreeTextEntry1] : Patient Name: FLOWER MARISCAL \par MRN: [id number] \par Radha MRN: [id number] \par Referring Provider: avi\par Date: 7/12/22\par \par Diagnosis: fistula tract\par Operative Date: (1) 4/8/21 (2) 6/22/22\par Procedure: (1) I&D fistula tract (2) laparoscopy, partial cholecystectomy \par Pathology: (1) inflammatory tissue\par \par He presents for a scheduled post operative visit. He is 20 days post op. At surgery the fistulous tract was resected w partial GB. Given his comorbidities and risks of doing more I decided to do a partial. Post op, he was treated medically for an elevated K and elevated blood glucose. He was discharged home on 6/28/22. Last week, he had abdominal pain over incision site and nauseousness but these symptoms have since resolves. He uses Percocet only as needed, a few times a week.. He denies fevers or chills. He is tolerating small regular diet. He denies fevers.\par \par \par Final Pathology Showed: eroded skin with granulation tissue\par \par

## 2022-07-12 NOTE — REASON FOR VISIT
[de-identified] : laparoscopy, partial cholecystectomy, excision of fistula tract of abdomen [de-identified] : 6/22/22 [de-identified] : 20

## 2022-07-19 ENCOUNTER — APPOINTMENT (OUTPATIENT)
Dept: ENDOCRINOLOGY | Facility: CLINIC | Age: 67
End: 2022-07-19

## 2022-07-19 VITALS
HEART RATE: 75 BPM | HEIGHT: 73 IN | OXYGEN SATURATION: 98 % | SYSTOLIC BLOOD PRESSURE: 100 MMHG | TEMPERATURE: 97.1 F | DIASTOLIC BLOOD PRESSURE: 60 MMHG

## 2022-07-19 PROCEDURE — 99214 OFFICE O/P EST MOD 30 MIN: CPT

## 2022-07-19 RX ORDER — GABAPENTIN 400 MG/1
400 CAPSULE ORAL 3 TIMES DAILY
Qty: 90 | Refills: 2 | Status: DISCONTINUED | COMMUNITY
Start: 2022-03-01 | End: 2022-07-19

## 2022-07-19 NOTE — PHYSICAL EXAM
[Alert] : alert [No Acute Distress] : no acute distress [No Proptosis] : no proptosis [Thyroid Not Enlarged] : the thyroid was not enlarged [No Respiratory Distress] : no respiratory distress [No Accessory Muscle Use] : no accessory muscle use [Clear to Auscultation] : lungs were clear to auscultation bilaterally [Normal S1, S2] : normal S1 and S2 [No Stigmata of Cushings Syndrome] : no stigmata of Cushings Syndrome [Oriented x3] : oriented to person, place, and time [de-identified] : in a wheelchair  [de-identified] : right amputation  [de-identified] : spastic movements

## 2022-07-19 NOTE — ASSESSMENT
[Diabetes Foot Care] : diabetes foot care [Long Term Vascular Complications] : long term vascular complications of diabetes [Importance of Diet and Exercise] : importance of diet and exercise to improve glycemic control, achieve weight loss and improve cardiovascular health [Exercise/Effect on Glucose] : exercise/effect on glucose [Self Monitoring of Blood Glucose] : self monitoring of blood glucose [FreeTextEntry1] : 67 year old male with PMH of CAD s/p MI, PCI/CABG, CHFrEF (15-20%), has ICD, HTN, celiac disease, insulin dependant DM  , neuropathy, chronic b/l LE ulcers presents, severe chronic pain, 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, and history of cholecystostomy tube placement in February 2020 for acute cholecystitis s/p removal in May 2020 with multiple presentations including persistent bilious drainage from the prior tract site as well as a RUQ abdominal abscess at the site s/p drainage and now s/p cholecystectomy 6/2022, septic arthritis in June, 2021 where they performed arthroscopic drainage, now for follow up insulin dependant diabetes \par \par \par #insulin dependant, type 1 DM ( EDUIN treated as type 1 ) \par - since discharge from the hospital , he is back on the pump, at 2 units /hr and  not using bolus wizard\par - CGM review with hyperglycemia during day  and hypoglycemia at night and in am \par - basal rate changed as follow : 12am - 3am : 2 units /hr , 3 am - 7 am : 1.9u/hr and 7 am - 12 am 2.1 u/hr \par - patient is on insulin pump and need to check FS at least 5-6 times/day and adjust insulin dosage based on FS , need CGM \par - advised to keep holding jardiance given recent amputation and UTI \par - given Gallbladder disease will avoid reusing trulicity now \par - Continue statin LDL ok \par - sever neuropathy, will need to increase duloxetine back to 90 mg (total )  as he is also off gabapentin \par \par #hypothyroidism \par - TSH ok, continue lt4 25 mcg daily \par  - need labs and f/u in 3 months \par \par \par

## 2022-07-19 NOTE — DATA REVIEWED
[FreeTextEntry1] : 11/2021: TSH 8.08  FT4 0.9  fructosamine 340 LDL 57 \par 2/4/22: glucose 199 hb 8.8  crea 1.2  GFR 64  AST 18 ALT 25 \par \par 7/6/22: A1c 11.6% LDL 44 tg 74 glucose 282 crea1.1  GFr 74  alkphos 138 TSH 4.03  Ft4 1.2 fructosamine 368 hb 11.6  tpo 27.1

## 2022-07-19 NOTE — HISTORY OF PRESENT ILLNESS
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, and history of cholecystostomy tube placement in February 2020 for acute cholecystitis s/p removal in May 2020 with multiple presentations including persistent bilious drainage from the prior tract site as well as a RUQ abdominal abscess at the site s/p drainage,now s/p cholecystectomy ( 6/2022) ,  septic arthritis in June, 2021 where they performed arthroscopic drainage, now for follow up insulin dependant diabetes \par \par \par Diagnosis  >30 years \par Current Regimen:  insulin Humalog in pump \par Previous regimens: was on po meds but taken off , has been on insulin pump for 5-6 years now  \par Compliance: ok , NOT using bolus wizard \par SMBG/CGM :  has Freestyle storm 14 days \par \par  \par prevention  \par Statin: yes rosuvastatin 40 mg \par \par Eye examination: yes \par Neuropathy: yes on Cymbalta \par \par \par 2/2022: since last visit patient had unfortunately complication with the right foot and ended up with amputation, when in hospital and rehab was on MDI , now back on the pump with a basal rate of 2 units /hr reports good numbers and no major hypo/hyperglycemia, is not using bolus wizard and , has some 200s post prandial \par off jardiance \par \par 7/2022: patient present today for follow up , with wife, had recent hospitalizations last one for cholecystectomy . and dose of insulin was reduced in pump. \par CGM review with high BG and hypoglycemia at night around 3-4 am , denies taking boluses at night \par his neuropathy is worse as he is on lower dose duloxetine , and iff gabapentin \par - remain on statin and on levothyroxine 25 mcg daily \par \par \par   [Hypoglycemia] : Patient is not hypoglycemic. [FreeTextEntry2] : [id number] [FreeTextEntry3] : 181-240  18% > 240 31%  < 70 10%

## 2022-07-19 NOTE — REASON FOR VISIT
[Follow - Up] : a follow-up visit [DM Type 1] : DM Type 1 [FreeTextEntry2] :  used to follow with endocrinology at [facility]

## 2022-07-25 NOTE — ED PROVIDER NOTE - IV ALTEPLASE EXCL ABS HIDDEN
Subjective findings: The patient return to the clinic today for postop visit #2, 2 weeks status post exostosis base of the fourth metatarsal right foot.  I informed the patient that there was no evidence of a ganglion cyst once the procedure was underway.  There was a hyperostosis which was removed.  The patient complained of mild to moderate pain and swelling.     Objective findings: The dressings were removed and wound margins are well coaptated and maintained.  There is minimal edema noted.  There is no erythema, cellulitis, drainage or bleeding noted.  Neurovascular status is intact.  Vital signs stable.     Assessment: Exostosis right foot     Plan:All sutures removed today.  I informed the patient that she may now gradually return to normal activities and normal shoe gear.  She is to return to the clinic as needed.     show

## 2022-08-15 ENCOUNTER — LABORATORY RESULT (OUTPATIENT)
Age: 67
End: 2022-08-15

## 2022-08-15 ENCOUNTER — NON-APPOINTMENT (OUTPATIENT)
Age: 67
End: 2022-08-15

## 2022-08-16 ENCOUNTER — LABORATORY RESULT (OUTPATIENT)
Age: 67
End: 2022-08-16

## 2022-08-17 ENCOUNTER — NON-APPOINTMENT (OUTPATIENT)
Age: 67
End: 2022-08-17

## 2022-08-19 ENCOUNTER — NON-APPOINTMENT (OUTPATIENT)
Age: 67
End: 2022-08-19

## 2022-08-22 ENCOUNTER — NON-APPOINTMENT (OUTPATIENT)
Age: 67
End: 2022-08-22

## 2022-08-22 LAB
BACTERIA UR CULT: ABNORMAL
VIT B12 SERPL-MCNC: 501 PG/ML

## 2022-09-29 ENCOUNTER — RX RENEWAL (OUTPATIENT)
Age: 67
End: 2022-09-29

## 2022-09-30 ENCOUNTER — APPOINTMENT (OUTPATIENT)
Dept: NEUROLOGY | Facility: CLINIC | Age: 67
End: 2022-09-30

## 2022-09-30 ENCOUNTER — NON-APPOINTMENT (OUTPATIENT)
Age: 67
End: 2022-09-30

## 2022-09-30 VITALS
HEART RATE: 79 BPM | HEIGHT: 73 IN | BODY MASS INDEX: 21.87 KG/M2 | WEIGHT: 165 LBS | DIASTOLIC BLOOD PRESSURE: 78 MMHG | SYSTOLIC BLOOD PRESSURE: 111 MMHG

## 2022-09-30 DIAGNOSIS — G25.3 MYOCLONUS: ICD-10-CM

## 2022-09-30 PROCEDURE — 99214 OFFICE O/P EST MOD 30 MIN: CPT

## 2022-09-30 RX ORDER — GAUZE BANDAGE 3.4"X129"
BANDAGE TOPICAL
Qty: 1 | Refills: 0 | Status: DISCONTINUED | OUTPATIENT
Start: 2020-11-09 | End: 2022-09-30

## 2022-09-30 RX ORDER — GAUZE BANDAGE 4.5"X147"
BANDAGE TOPICAL
Qty: 1 | Refills: 0 | Status: DISCONTINUED | OUTPATIENT
Start: 2020-11-12 | End: 2022-09-30

## 2022-09-30 RX ORDER — DIAZEPAM 2 MG/1
2 TABLET ORAL
Qty: 60 | Refills: 3 | Status: DISCONTINUED | COMMUNITY
Start: 2021-12-13 | End: 2022-09-30

## 2022-09-30 RX ORDER — ADHESIVE TAPE 3"X 2.3 YD
4"X4" TAPE, NON-MEDICATED TOPICAL
Qty: 6 | Refills: 0 | Status: DISCONTINUED | OUTPATIENT
Start: 2020-11-12 | End: 2022-09-30

## 2022-09-30 RX ORDER — ADHESIVE TAPE 3"X 2.3 YD
4"X4" TAPE, NON-MEDICATED TOPICAL
Qty: 1 | Refills: 2 | Status: DISCONTINUED | OUTPATIENT
Start: 2020-11-09 | End: 2022-09-30

## 2022-09-30 NOTE — ASSESSMENT
[FreeTextEntry1] : 67 year old man with history of poorly controlled diabetes, anemia, anxiety and depression, CAD, CHF, hyperlipidemia, PAD, colicystitis s/o biliary Drainage and osteoarthritis s/p knee surgery and complicated septic arthritis, right leg AKA  is here as follow up visit. He missed his appointment for EMG but based on exam has likely ever polyneuropathy. Still has jerky movement on sleep.  Ambulatory EEG was unremarkable. MRIs \par \par - MRI and C spine MRI w/o pending. WIll hold off on that \par - Reschedule of EMG of both arms and leg leg \par - Low dose Valium 1mg tablet qhs for myoclonus jerks.\par - Refferal to sleep study for sleep apnea and parasomnia \par - RTC during the EMG test \par

## 2022-09-30 NOTE — PHYSICAL EXAM
[FreeTextEntry1] : Mental status: Awake, alert and oriented x3. Recent and remote memory intact. Naming, repetition and comprehension intact. Attention/concentration intact. No dysarthria, no aphasia. Fund of knowledge appropriate. \par Cranial nerves: Pupils equally round and reactive to light, visual fields full, no nystagmus, extraocular muscles intact, V1 through V3 intact bilaterally and symmetric, face symmetric, hearing intact to finger rub, palate elevation symmetric, tongue was midline.\par Motor: MRC grading, 4/5 in bilateral deltoid due to shoulder rotator cuff. Intact bilateral biceps and triceps. Bilateral APB: 4/5. Bilateral IO: 4/5. 4/5 finger extension. Significant atrophy in IOs and APB muscles. Normal tone. Mild kinetic tremor. No rigidity. Hip flexion: 5/5. Reduced Knees range of motion. \par \par Left ankle dorsiflexion: 4/5 plantar flexion, inversion and eversion: 5/5.\par Sensation: Reduced PP and LT from mid leg. in the left and absent vibration in the left \par Coordination: No dysmetria on finger-to-nose \par Reflexes: 2+ in biceps. Absent  patella and left ankle. Right leg AKA \par Gait: deferred

## 2022-09-30 NOTE — HISTORY OF PRESENT ILLNESS
[FreeTextEntry1] : FLOWER MARISCAL is a 66 year old man with history of poorly controlled diabetes, anemia, anxiety and depression, CAD, CHF, hyperlipidemia, PAD, colicystitis s/o biliary Drainage and osteoarthritis s/p knee surgery and complicated septic arthritis is here as a follow up visit. He visited me on Dec 2021 for symptoms of numbness and paresthesia in his hands and feet for many years. He is on gabapentin and Cymbalta 30 mg BID which helps him with neuropathic pain. He has been using wheelchair due to knee arthritis and reduced knee range of motion. He has long history of diabetes and is using insulin pump. Reports better control of blood sugar but his recent A1C level is 9. He has weakness in his hands. 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 \par Since last visit he underwent right above knee amputation, cholecystectomy and infection. Today he presented to the office with his wife. He reports pain in the right knee. Stable numbness and paresthesia in the left foot. Also his wife showed me a video of him moving his extremities on sleep. His recent A1C level was 11.

## 2022-10-11 ENCOUNTER — APPOINTMENT (OUTPATIENT)
Dept: BURN CARE | Facility: CLINIC | Age: 67
End: 2022-10-11

## 2022-10-11 ENCOUNTER — OUTPATIENT (OUTPATIENT)
Dept: OUTPATIENT SERVICES | Facility: HOSPITAL | Age: 67
LOS: 1 days | Discharge: HOME | End: 2022-10-11

## 2022-10-11 DIAGNOSIS — Z90.89 ACQUIRED ABSENCE OF OTHER ORGANS: Chronic | ICD-10-CM

## 2022-10-11 DIAGNOSIS — Z98.890 OTHER SPECIFIED POSTPROCEDURAL STATES: Chronic | ICD-10-CM

## 2022-10-11 DIAGNOSIS — S81.802A UNSPECIFIED OPEN WOUND, LEFT LOWER LEG, INITIAL ENCOUNTER: ICD-10-CM

## 2022-10-11 DIAGNOSIS — Z41.9 ENCOUNTER FOR PROCEDURE FOR PURPOSES OTHER THAN REMEDYING HEALTH STATE, UNSPECIFIED: Chronic | ICD-10-CM

## 2022-10-11 DIAGNOSIS — Z96.641 PRESENCE OF RIGHT ARTIFICIAL HIP JOINT: Chronic | ICD-10-CM

## 2022-10-11 DIAGNOSIS — Z43.4 ENCOUNTER FOR ATTENTION TO OTHER ARTIFICIAL OPENINGS OF DIGESTIVE TRACT: Chronic | ICD-10-CM

## 2022-10-11 DIAGNOSIS — Z95.5 PRESENCE OF CORONARY ANGIOPLASTY IMPLANT AND GRAFT: Chronic | ICD-10-CM

## 2022-10-11 DIAGNOSIS — Z96.651 PRESENCE OF RIGHT ARTIFICIAL KNEE JOINT: Chronic | ICD-10-CM

## 2022-10-11 DIAGNOSIS — Z95.1 PRESENCE OF AORTOCORONARY BYPASS GRAFT: Chronic | ICD-10-CM

## 2022-10-11 DIAGNOSIS — Z95.810 PRESENCE OF AUTOMATIC (IMPLANTABLE) CARDIAC DEFIBRILLATOR: Chronic | ICD-10-CM

## 2022-10-11 PROCEDURE — 99213 OFFICE O/P EST LOW 20 MIN: CPT

## 2022-10-11 NOTE — PROCEDURE NOTE - NSPOSTCAREGUIDE_GEN_A_CORE
General surgery will change packing tomorrow. Nurse may replace gauze dressing and tape if saturates
Care for catheter as per unit/ICU protocols/Verbal/written post procedure instructions were given to patient/caregiver/Keep the cast/splint/dressing clean and dry
no

## 2022-10-12 DIAGNOSIS — S81.802A UNSPECIFIED OPEN WOUND, LEFT LOWER LEG, INITIAL ENCOUNTER: ICD-10-CM
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
## 2022-10-12 DIAGNOSIS — S71.102A UNSPECIFIED OPEN WOUND, LEFT THIGH, INITIAL ENCOUNTER: ICD-10-CM

## 2022-10-17 LAB — BACTERIA SPEC CULT: ABNORMAL

## 2022-10-18 ENCOUNTER — LABORATORY RESULT (OUTPATIENT)
Age: 67
End: 2022-10-18

## 2022-10-26 ENCOUNTER — APPOINTMENT (OUTPATIENT)
Dept: ENDOCRINOLOGY | Facility: CLINIC | Age: 67
End: 2022-10-26

## 2022-10-26 DIAGNOSIS — R30.0 DYSURIA: ICD-10-CM

## 2022-10-26 PROCEDURE — 99214 OFFICE O/P EST MOD 30 MIN: CPT | Mod: 95

## 2022-10-26 NOTE — REASON FOR VISIT
[Follow - Up] : a follow-up visit [DM Type 1] : DM Type 1 [FreeTextEntry2] :  used to follow with endocrinology at [facility]

## 2022-10-26 NOTE — ASSESSMENT
[Diabetes Foot Care] : diabetes foot care [Long Term Vascular Complications] : long term vascular complications of diabetes [Importance of Diet and Exercise] : importance of diet and exercise to improve glycemic control, achieve weight loss and improve cardiovascular health [Exercise/Effect on Glucose] : exercise/effect on glucose [Self Monitoring of Blood Glucose] : self monitoring of blood glucose [FreeTextEntry1] : 67 year old male with PMH of CAD s/p MI, PCI/CABG, CHFrEF (15-20%), has ICD, HTN, celiac disease, insulin dependant DM  , neuropathy, chronic b/l LE ulcers presents, severe chronic pain, 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, and history of cholecystostomy tube placement in February 2020 for acute cholecystitis s/p removal in May 2020 with multiple presentations including persistent bilious drainage from the prior tract site as well as a RUQ abdominal abscess at the site s/p drainage and now s/p cholecystectomy 6/2022, septic arthritis in June, 2021 where they performed arthroscopic drainage, now for follow up insulin dependant diabetes \par \par \par #insulin dependant, type 1 DM ( EDUIN treated as type 1 ) \par - off insulin pump over the past month and he feels weak to control, and wife helping with MDI \par - SMBG with above target numbers depiste low appetite \par - increase Basaglar to 2 units and increase by 2 units every 2-3 days if remain in am > 150 \par - increase Novololg to 30 units TIDAC and also uptitrate by 2 units if remain > 200, discussed hypoglycemia \par - resend log in 2-3 weeks and will adjust further \par - he will follow up with urology / PCP for UTI / dysuria \par - Continue statin LDL ok \par - sever neuropathy,  on duloxetine  90 mg (total )  as he is also off gabapentin \par \par #hypothyroidism \par - TSH high , continue lt4 25 mcg 6days/ week and 50 mcg on day 7 \par \par \par \par \par

## 2022-10-26 NOTE — DATA REVIEWED
[FreeTextEntry1] : 11/2021: TSH 8.08  FT4 0.9  fructosamine 340 LDL 57 \par 2/4/22: glucose 199 hb 8.8  crea 1.2  GFR 64  AST 18 ALT 25 \par \par 7/6/22: A1c 11.6% LDL 44 tg 74 glucose 282 crea1.1  GFr 74  alkphos 138 TSH 4.03  Ft4 1.2 fructosamine 368 hb 11.6  tpo 27.1 \par \par 10/2022: A1c 11.1% AST 17 ALT 21 ALkpkos 118   u/a positive LE b12 766 TSH 9.48 Ft4 1.4  LDL 22 Tg 56 crea 1.08  GFR 76

## 2022-10-26 NOTE — PHYSICAL EXAM
[Alert] : alert [No Acute Distress] : no acute distress [No Accessory Muscle Use] : no accessory muscle use [No Stigmata of Cushings Syndrome] : no stigmata of Cushings Syndrome [Oriented x3] : oriented to person, place, and time [No Respiratory Distress] : no respiratory distress [de-identified] : visual only through video  [de-identified] : right amputation  [de-identified] : spastic movements

## 2022-10-26 NOTE — HISTORY OF PRESENT ILLNESS
[Home] : at home, [unfilled] , at the time of the visit. 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, and history of cholecystostomy tube placement in February 2020 for acute cholecystitis s/p removal in May 2020 with multiple presentations including persistent bilious drainage from the prior tract site as well as a RUQ abdominal abscess at the site s/p drainage,now s/p cholecystectomy ( 6/2022) ,  septic arthritis in June, 2021 where they performed arthroscopic drainage, now for follow up insulin dependant diabetes \par \par \par Diagnosis  >30 years \par Current Regimen:  insulin Humalog in pump and basal/bolus as back up \par Previous regimens: was on po meds but taken off , has been on insulin pump for 5-6 years now  \par Compliance: ok , NOT using bolus wizard \par SMBG/CGM :  has Freestyle storm 14 days \par \par  \par prevention  \par Statin: yes rosuvastatin 40 mg \par \par Eye examination: yes \par Neuropathy: yes on Cymbalta \par \par \par 2/2022: since last visit patient had unfortunately complication with the right foot and ended up with amputation, when in hospital and rehab was on MDI , now back on the pump with a basal rate of 2 units /hr reports good numbers and no major hypo/hyperglycemia, is not using bolus wizard and , has some 200s post prandial \par off jardiance \par \par 7/2022: patient present today for follow up , with wife, had recent hospitalizations last one for cholecystectomy . and dose of insulin was reduced in pump. \par CGM review with high BG and hypoglycemia at night around 3-4 am , denies taking boluses at night \par his neuropathy is worse as he is on lower dose duloxetine , and iff gabapentin \par - remain on statin and on levothyroxine 25 mcg daily \par \par \par 10/2022: Telehealth visit follow up \par - Over the past month patient was feeling weak and was switched from insulin pump to MDI ( wife is helping with administration ) \par - BG log reviewed remain high n 200s when on basaglar 42 units and lispro 26 units TIDAC , appetite is low and even if taking NovoLog and not eating numbers in hgih 100 , no hypoglycemia \par has been on antibiotics for LE infection and also for UTI \par \par   [Finger Stick] : Finger Stick: Yes [Hypoglycemia] : Patient is not hypoglycemic. [FreeTextEntry3] :  240 31%  < 70 10%  [FreeTextEntry9] : log attached

## 2022-10-26 NOTE — REVIEW OF SYSTEMS
[Fatigue] : fatigue [Blurred Vision] : blurred vision [Pain/Numbness of Digits] : pain/numbness of digits [Recent Weight Gain (___ Lbs)] : no recent weight gain [Recent Weight Loss (___ Lbs)] : no recent weight loss [Fever] : no fever [Dysphagia] : no dysphagia [Neck Pain] : no neck pain [Dysphonia] : no dysphonia [Chest Pain] : no chest pain [Palpitations] : no palpitations [Fast Heart Rate] : heart rate is not fast [Shortness Of Breath] : no shortness of breath [Wheezing] : no wheezing [SOB on Exertion] : no shortness of breath on exertion [Nausea] : nausea [Constipation] : no constipation [Vomiting] : no vomiting [Diarrhea] : no diarrhea [As Noted in HPI] : as noted in HPI [Dysuria] : dysuria [Muscle Weakness] : no muscle weakness [Acne] : no acne [Dizziness] : no dizziness [Cold Intolerance] : no cold intolerance

## 2022-10-26 NOTE — THERAPY
[Today's Date] : [unfilled] [Basaglar] : Basaglar [Novolog] : Novolog [FreeTextEntry9] : 42 units  [de-identified] : 26 units TIDAC  [de-identified] : [employer]

## 2022-10-27 ENCOUNTER — OUTPATIENT (OUTPATIENT)
Dept: OUTPATIENT SERVICES | Facility: HOSPITAL | Age: 67
LOS: 1 days | Discharge: HOME | End: 2022-10-27

## 2022-10-27 ENCOUNTER — APPOINTMENT (OUTPATIENT)
Dept: BURN CARE | Facility: CLINIC | Age: 67
End: 2022-10-27

## 2022-10-27 DIAGNOSIS — Z98.890 OTHER SPECIFIED POSTPROCEDURAL STATES: Chronic | ICD-10-CM

## 2022-10-27 DIAGNOSIS — Z95.1 PRESENCE OF AORTOCORONARY BYPASS GRAFT: Chronic | ICD-10-CM

## 2022-10-27 DIAGNOSIS — Z96.651 PRESENCE OF RIGHT ARTIFICIAL KNEE JOINT: Chronic | ICD-10-CM

## 2022-10-27 DIAGNOSIS — Z41.9 ENCOUNTER FOR PROCEDURE FOR PURPOSES OTHER THAN REMEDYING HEALTH STATE, UNSPECIFIED: Chronic | ICD-10-CM

## 2022-10-27 DIAGNOSIS — Z95.5 PRESENCE OF CORONARY ANGIOPLASTY IMPLANT AND GRAFT: Chronic | ICD-10-CM

## 2022-10-27 DIAGNOSIS — Z96.641 PRESENCE OF RIGHT ARTIFICIAL HIP JOINT: Chronic | ICD-10-CM

## 2022-10-27 DIAGNOSIS — Z95.810 PRESENCE OF AUTOMATIC (IMPLANTABLE) CARDIAC DEFIBRILLATOR: Chronic | ICD-10-CM

## 2022-10-27 DIAGNOSIS — L98.8 OTHER SPECIFIED DISORDERS OF THE SKIN AND SUBCUTANEOUS TISSUE: ICD-10-CM

## 2022-10-27 DIAGNOSIS — Z90.89 ACQUIRED ABSENCE OF OTHER ORGANS: Chronic | ICD-10-CM

## 2022-10-27 DIAGNOSIS — Z43.4 ENCOUNTER FOR ATTENTION TO OTHER ARTIFICIAL OPENINGS OF DIGESTIVE TRACT: Chronic | ICD-10-CM

## 2022-10-27 PROCEDURE — 11045 DBRDMT SUBQ TISS EACH ADDL: CPT

## 2022-10-27 PROCEDURE — 11042 DBRDMT SUBQ TIS 1ST 20SQCM/<: CPT

## 2022-10-29 LAB — BACTERIA SPEC CULT: NORMAL

## 2022-10-31 NOTE — CONSULT NOTE ADULT - PROBLEM/RECOMMENDATION-4
DISPLAY PLAN FREE TEXT

## 2022-11-02 ENCOUNTER — APPOINTMENT (OUTPATIENT)
Age: 67
End: 2022-11-02

## 2022-11-02 VITALS
HEART RATE: 84 BPM | RESPIRATION RATE: 14 BRPM | HEIGHT: 73 IN | OXYGEN SATURATION: 96 % | BODY MASS INDEX: 22.53 KG/M2 | WEIGHT: 170 LBS | DIASTOLIC BLOOD PRESSURE: 70 MMHG | SYSTOLIC BLOOD PRESSURE: 120 MMHG

## 2022-11-02 DIAGNOSIS — G25.81 RESTLESS LEGS SYNDROME: ICD-10-CM

## 2022-11-02 DIAGNOSIS — G47.50 PARASOMNIA, UNSPECIFIED: ICD-10-CM

## 2022-11-02 DIAGNOSIS — G47.61 PERIODIC LIMB MOVEMENT DISORDER: ICD-10-CM

## 2022-11-02 PROCEDURE — 99203 OFFICE O/P NEW LOW 30 MIN: CPT

## 2022-11-02 NOTE — ASSESSMENT
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

## 2022-11-02 NOTE — REASON FOR VISIT
[Initial] : an initial visit [TextBox_44] :  The patient presents for the evaluation of abnormal movements at night.  He comes with his wife.  He states for the past several months starts to develop abnormal sensations in his limbs He has a right AKA.  Once he falls asleep his wife states that he makes jerking motions of his upper and lower extremities.  This is extremely disruptive over the course of the night and the wife sleeps in another room.  He does not speak or make other vocalizations.  The movements are not dream related.  His wife feels they are more prominent in the early hours just after falling asleep and decreased over the course of the night.  However the patient does not sleep in the same bed with her anymore.\par \par There is no snoring or other sounds of abnormal breathing at night.  The wife states that he does seem to have an irregular pattern however.  Of note the patient has been told that he is iron deficient.  We will

## 2022-11-02 NOTE — PHYSICAL EXAM
[No Acute Distress] : no acute distress [Normal Oropharynx] : normal oropharynx [Normal Appearance] : normal appearance [No Neck Mass] : no neck mass [Normal Rate/Rhythm] : normal rate/rhythm [Normal S1, S2] : normal s1, s2 [No Murmurs] : no murmurs [No Resp Distress] : no resp distress [Clear to Auscultation Bilaterally] : clear to auscultation bilaterally [No Abnormalities] : no abnormalities [Benign] : benign [Normal Gait] : normal gait [No Clubbing] : no clubbing [No Cyanosis] : no cyanosis [No Edema] : no edema [FROM] : FROM [Normal Color/ Pigmentation] : normal color/ pigmentation [No Focal Deficits] : no focal deficits [Oriented x3] : oriented x3 [Normal Affect] : normal affect [TextBox_105] : Right

## 2022-11-17 ENCOUNTER — APPOINTMENT (OUTPATIENT)
Dept: BURN CARE | Facility: CLINIC | Age: 67
End: 2022-11-17

## 2022-11-17 ENCOUNTER — OUTPATIENT (OUTPATIENT)
Dept: OUTPATIENT SERVICES | Facility: HOSPITAL | Age: 67
LOS: 1 days | Discharge: HOME | End: 2022-11-17

## 2022-11-17 DIAGNOSIS — Z41.9 ENCOUNTER FOR PROCEDURE FOR PURPOSES OTHER THAN REMEDYING HEALTH STATE, UNSPECIFIED: Chronic | ICD-10-CM

## 2022-11-17 DIAGNOSIS — Z96.651 PRESENCE OF RIGHT ARTIFICIAL KNEE JOINT: Chronic | ICD-10-CM

## 2022-11-17 DIAGNOSIS — Z95.1 PRESENCE OF AORTOCORONARY BYPASS GRAFT: Chronic | ICD-10-CM

## 2022-11-17 DIAGNOSIS — Z98.890 OTHER SPECIFIED POSTPROCEDURAL STATES: Chronic | ICD-10-CM

## 2022-11-17 DIAGNOSIS — Z95.810 PRESENCE OF AUTOMATIC (IMPLANTABLE) CARDIAC DEFIBRILLATOR: Chronic | ICD-10-CM

## 2022-11-17 DIAGNOSIS — Z96.641 PRESENCE OF RIGHT ARTIFICIAL HIP JOINT: Chronic | ICD-10-CM

## 2022-11-17 DIAGNOSIS — Z43.4 ENCOUNTER FOR ATTENTION TO OTHER ARTIFICIAL OPENINGS OF DIGESTIVE TRACT: Chronic | ICD-10-CM

## 2022-11-17 DIAGNOSIS — Z95.5 PRESENCE OF CORONARY ANGIOPLASTY IMPLANT AND GRAFT: Chronic | ICD-10-CM

## 2022-11-17 DIAGNOSIS — Z90.89 ACQUIRED ABSENCE OF OTHER ORGANS: Chronic | ICD-10-CM

## 2022-11-17 PROCEDURE — 99212 OFFICE O/P EST SF 10 MIN: CPT
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
## 2022-11-17 NOTE — PHYSICAL EXAM
[Healing] : healing [Size%: ______] : Size: [unfilled]% [3] : 3 out of 10 [Abnormal] : abnormal [Large] : medium [] : no [de-identified] : left lower leg 10x5cm and left thigh 3x3cm -- left foot .2x.2cm , right 3x2cm <  -> local wound care \par \par \par \par follow up 2 - 4  weeks  [TWNoteComboBox1] : xeroform [TWNoteComboBox2] : Bacitracin

## 2022-11-17 NOTE — REASON FOR VISIT
[Revisit] : revisit [Were you seen in the Emergency Room?] : seen in the emergency room [Were you admitted to the burn center at [facility]?] : not admitted to the burn center at [facility] [Family Member] : family member

## 2022-11-17 NOTE — ASSESSMENT
[FreeTextEntry1] : left lower leg 10x5cm and left thigh 3x3cm -- left foot .2x.2cm , right 3x2cm <  -> local wound care \par \par \par \par follow up 2 - 4  weeks  [Wound Care] : wound care

## 2022-11-24 NOTE — BEHAVIORAL HEALTH ASSESSMENT NOTE - NICOTINE
None known
Abdomen soft, non-tender and non-distended, no rebound, no guarding and no masses. no hepatosplenomegaly.

## 2022-11-25 ENCOUNTER — RX RENEWAL (OUTPATIENT)
Age: 67
End: 2022-11-25

## 2022-11-27 NOTE — ASSESSMENT
[Wound Care] : wound care [FreeTextEntry1] : left lower leg 10x5cm and left thigh 3x3cm -- left foot .2x.2cm , right 3x2cm <> culture sent left lower leg  -> local wound care \par \par excisional debridement with scissors devitalized tissue to subcutaneous tissue \par \par follow up 2 - 4  weeks

## 2022-11-27 NOTE — REASON FOR VISIT
[Revisit] : revisit [Were you seen in the Emergency Room?] : seen in the emergency room [Family Member] : family member [Were you admitted to the burn center at [facility]?] : not admitted to the burn center at [facility]

## 2022-11-27 NOTE — PHYSICAL EXAM
[Healing] : healing [Size%: ______] : Size: [unfilled]% [3] : 3 out of 10 [Abnormal] : abnormal [Large] : medium [] : yes [de-identified] : left lower leg 10x5cm and left thigh 3x3cm -- left foot .2x.2cm , right 3x2cm <> culture sent left lower leg  -> local wound care \par \par excisional debridement with scissors devitalized tissue to subcutaneous tissue \par \par follow up 2 - 4  weeks  [TWNoteComboBox1] : xeroform [TWNoteComboBox2] : Bacitracin
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
## 2022-11-30 DIAGNOSIS — L97.529 NON-PRESSURE CHRONIC ULCER OF OTHER PART OF LEFT FOOT WITH UNSPECIFIED SEVERITY: ICD-10-CM

## 2022-11-30 DIAGNOSIS — Z98.890 OTHER SPECIFIED POSTPROCEDURAL STATES: ICD-10-CM

## 2022-11-30 DIAGNOSIS — L98.8 OTHER SPECIFIED DISORDERS OF THE SKIN AND SUBCUTANEOUS TISSUE: ICD-10-CM

## 2022-11-30 DIAGNOSIS — E11.621 TYPE 2 DIABETES MELLITUS WITH FOOT ULCER: ICD-10-CM

## 2022-12-20 NOTE — ED ADULT NURSE NOTE - NS ED PATIENT SAFETY CONCERN
Detail Level: Detailed Quality 226: Preventive Care And Screening: Tobacco Use: Screening And Cessation Intervention: Patient screened for tobacco use and is an ex/non-smoker Quality 431: Preventive Care And Screening: Unhealthy Alcohol Use - Screening: Patient not identified as an unhealthy alcohol user when screened for unhealthy alcohol use using a systematic screening method Quality 130: Documentation Of Current Medications In The Medical Record: Current Medications Documented Quality 111:Pneumonia Vaccination Status For Older Adults: Pneumococcal vaccine (PPSV23) administered on or after patient’s 60th birthday and before the end of the measurement period No

## 2023-01-01 ENCOUNTER — RX RENEWAL (OUTPATIENT)
Age: 68
End: 2023-01-01

## 2023-01-01 ENCOUNTER — APPOINTMENT (OUTPATIENT)
Dept: CARDIOLOGY | Facility: CLINIC | Age: 68
End: 2023-01-01
Payer: MEDICARE

## 2023-01-01 ENCOUNTER — TRANSCRIPTION ENCOUNTER (OUTPATIENT)
Age: 68
End: 2023-01-01

## 2023-01-01 ENCOUNTER — APPOINTMENT (OUTPATIENT)
Dept: RHEUMATOLOGY | Facility: CLINIC | Age: 68
End: 2023-01-01
Payer: MEDICARE

## 2023-01-01 ENCOUNTER — APPOINTMENT (OUTPATIENT)
Dept: SURGERY | Facility: CLINIC | Age: 68
End: 2023-01-01

## 2023-01-01 ENCOUNTER — NON-APPOINTMENT (OUTPATIENT)
Age: 68
End: 2023-01-01

## 2023-01-01 ENCOUNTER — APPOINTMENT (OUTPATIENT)
Dept: RHEUMATOLOGY | Facility: CLINIC | Age: 68
End: 2023-01-01

## 2023-01-01 ENCOUNTER — APPOINTMENT (OUTPATIENT)
Dept: ENDOCRINOLOGY | Facility: CLINIC | Age: 68
End: 2023-01-01
Payer: MEDICARE

## 2023-01-01 ENCOUNTER — INPATIENT (INPATIENT)
Facility: HOSPITAL | Age: 68
LOS: 6 days | Discharge: HOME HEALTH | DRG: 291 | End: 2023-03-01
Attending: INTERNAL MEDICINE | Admitting: INTERNAL MEDICINE
Payer: MEDICARE

## 2023-01-01 ENCOUNTER — LABORATORY RESULT (OUTPATIENT)
Age: 68
End: 2023-01-01

## 2023-01-01 ENCOUNTER — APPOINTMENT (OUTPATIENT)
Dept: PULMONOLOGY | Facility: CLINIC | Age: 68
End: 2023-01-01
Payer: MEDICARE

## 2023-01-01 ENCOUNTER — INPATIENT (INPATIENT)
Facility: HOSPITAL | Age: 68
LOS: 0 days | Discharge: LEFT BEFORE DISCHARGE | End: 2023-05-02
Attending: INTERNAL MEDICINE | Admitting: INTERNAL MEDICINE
Payer: MEDICARE

## 2023-01-01 ENCOUNTER — INPATIENT (INPATIENT)
Facility: HOSPITAL | Age: 68
LOS: 31 days | Discharge: HOME | DRG: 280 | End: 2023-06-19
Attending: INTERNAL MEDICINE | Admitting: INTERNAL MEDICINE
Payer: MEDICARE

## 2023-01-01 ENCOUNTER — INPATIENT (INPATIENT)
Facility: HOSPITAL | Age: 68
LOS: 16 days | Discharge: HOME HEALTH | End: 2023-01-27
Attending: INTERNAL MEDICINE | Admitting: INTERNAL MEDICINE
Payer: MEDICARE

## 2023-01-01 ENCOUNTER — EMERGENCY (EMERGENCY)
Facility: HOSPITAL | Age: 68
LOS: 0 days | Discharge: HOME | End: 2023-11-04
Attending: EMERGENCY MEDICINE
Payer: MEDICARE

## 2023-01-01 ENCOUNTER — INPATIENT (INPATIENT)
Facility: HOSPITAL | Age: 68
LOS: 1 days | DRG: 871 | End: 2023-12-07
Attending: INTERNAL MEDICINE | Admitting: INTERNAL MEDICINE
Payer: MEDICARE

## 2023-01-01 ENCOUNTER — APPOINTMENT (OUTPATIENT)
Dept: ELECTROPHYSIOLOGY | Facility: CLINIC | Age: 68
End: 2023-01-01
Payer: MEDICARE

## 2023-01-01 ENCOUNTER — APPOINTMENT (OUTPATIENT)
Dept: NUTRITION | Facility: CLINIC | Age: 68
End: 2023-01-01

## 2023-01-01 ENCOUNTER — APPOINTMENT (OUTPATIENT)
Age: 68
End: 2023-01-01
Payer: MEDICARE

## 2023-01-01 VITALS
OXYGEN SATURATION: 99 % | RESPIRATION RATE: 18 BRPM | TEMPERATURE: 98 F | HEART RATE: 79 BPM | SYSTOLIC BLOOD PRESSURE: 106 MMHG | DIASTOLIC BLOOD PRESSURE: 57 MMHG

## 2023-01-01 VITALS
DIASTOLIC BLOOD PRESSURE: 86 MMHG | HEART RATE: 77 BPM | OXYGEN SATURATION: 99 % | SYSTOLIC BLOOD PRESSURE: 125 MMHG | TEMPERATURE: 98 F | RESPIRATION RATE: 17 BRPM | WEIGHT: 169.98 LBS

## 2023-01-01 VITALS
RESPIRATION RATE: 18 BRPM | HEART RATE: 91 BPM | TEMPERATURE: 96 F | SYSTOLIC BLOOD PRESSURE: 93 MMHG | DIASTOLIC BLOOD PRESSURE: 85 MMHG | OXYGEN SATURATION: 99 %

## 2023-01-01 VITALS — HEIGHT: 73 IN | BODY MASS INDEX: 21.87 KG/M2 | WEIGHT: 165 LBS

## 2023-01-01 VITALS
SYSTOLIC BLOOD PRESSURE: 92 MMHG | WEIGHT: 150 LBS | OXYGEN SATURATION: 97 % | BODY MASS INDEX: 19.88 KG/M2 | HEIGHT: 73 IN | HEART RATE: 69 BPM | DIASTOLIC BLOOD PRESSURE: 60 MMHG

## 2023-01-01 VITALS
SYSTOLIC BLOOD PRESSURE: 102 MMHG | HEART RATE: 110 BPM | WEIGHT: 145.06 LBS | OXYGEN SATURATION: 94 % | HEIGHT: 73 IN | RESPIRATION RATE: 18 BRPM | DIASTOLIC BLOOD PRESSURE: 62 MMHG | TEMPERATURE: 98 F

## 2023-01-01 VITALS
TEMPERATURE: 97.6 F | OXYGEN SATURATION: 96 % | RESPIRATION RATE: 14 BRPM | SYSTOLIC BLOOD PRESSURE: 90 MMHG | HEIGHT: 73 IN | BODY MASS INDEX: 18.16 KG/M2 | DIASTOLIC BLOOD PRESSURE: 58 MMHG | WEIGHT: 137 LBS | HEART RATE: 95 BPM

## 2023-01-01 VITALS
OXYGEN SATURATION: 99 % | WEIGHT: 140 LBS | HEIGHT: 73 IN | DIASTOLIC BLOOD PRESSURE: 66 MMHG | SYSTOLIC BLOOD PRESSURE: 106 MMHG | HEART RATE: 76 BPM | BODY MASS INDEX: 18.55 KG/M2

## 2023-01-01 VITALS
WEIGHT: 147.05 LBS | TEMPERATURE: 98 F | HEART RATE: 91 BPM | OXYGEN SATURATION: 96 % | SYSTOLIC BLOOD PRESSURE: 115 MMHG | DIASTOLIC BLOOD PRESSURE: 76 MMHG | RESPIRATION RATE: 18 BRPM

## 2023-01-01 VITALS
HEIGHT: 73 IN | TEMPERATURE: 97.1 F | WEIGHT: 147 LBS | RESPIRATION RATE: 18 BRPM | SYSTOLIC BLOOD PRESSURE: 90 MMHG | HEART RATE: 59 BPM | DIASTOLIC BLOOD PRESSURE: 52 MMHG | BODY MASS INDEX: 19.48 KG/M2

## 2023-01-01 VITALS
HEIGHT: 73 IN | WEIGHT: 140 LBS | SYSTOLIC BLOOD PRESSURE: 100 MMHG | OXYGEN SATURATION: 98 % | TEMPERATURE: 97.4 F | HEART RATE: 75 BPM | DIASTOLIC BLOOD PRESSURE: 60 MMHG | BODY MASS INDEX: 18.55 KG/M2

## 2023-01-01 VITALS — BODY MASS INDEX: 19.22 KG/M2 | HEART RATE: 92 BPM | WEIGHT: 145 LBS | HEIGHT: 73 IN | OXYGEN SATURATION: 90 %

## 2023-01-01 VITALS
SYSTOLIC BLOOD PRESSURE: 82 MMHG | OXYGEN SATURATION: 95 % | HEART RATE: 68 BPM | DIASTOLIC BLOOD PRESSURE: 56 MMHG | HEIGHT: 72 IN | WEIGHT: 149.91 LBS | TEMPERATURE: 102 F | RESPIRATION RATE: 19 BRPM

## 2023-01-01 VITALS
SYSTOLIC BLOOD PRESSURE: 93 MMHG | OXYGEN SATURATION: 99 % | TEMPERATURE: 98 F | HEART RATE: 72 BPM | RESPIRATION RATE: 18 BRPM | DIASTOLIC BLOOD PRESSURE: 52 MMHG | WEIGHT: 149.91 LBS

## 2023-01-01 VITALS
WEIGHT: 10.31 LBS | HEIGHT: 73 IN | TEMPERATURE: 97.1 F | RESPIRATION RATE: 14 BRPM | SYSTOLIC BLOOD PRESSURE: 100 MMHG | HEART RATE: 103 BPM | DIASTOLIC BLOOD PRESSURE: 60 MMHG | BODY MASS INDEX: 1.37 KG/M2

## 2023-01-01 VITALS
HEART RATE: 90 BPM | DIASTOLIC BLOOD PRESSURE: 58 MMHG | WEIGHT: 149.91 LBS | HEIGHT: 73 IN | SYSTOLIC BLOOD PRESSURE: 100 MMHG | OXYGEN SATURATION: 98 % | TEMPERATURE: 98 F | RESPIRATION RATE: 18 BRPM

## 2023-01-01 VITALS — HEART RATE: 96 BPM | DIASTOLIC BLOOD PRESSURE: 55 MMHG | SYSTOLIC BLOOD PRESSURE: 83 MMHG | RESPIRATION RATE: 18 BRPM

## 2023-01-01 VITALS
SYSTOLIC BLOOD PRESSURE: 104 MMHG | TEMPERATURE: 97 F | HEART RATE: 82 BPM | DIASTOLIC BLOOD PRESSURE: 57 MMHG | RESPIRATION RATE: 18 BRPM

## 2023-01-01 VITALS
SYSTOLIC BLOOD PRESSURE: 101 MMHG | HEART RATE: 66 BPM | RESPIRATION RATE: 18 BRPM | TEMPERATURE: 96 F | OXYGEN SATURATION: 96 % | DIASTOLIC BLOOD PRESSURE: 71 MMHG

## 2023-01-01 VITALS — RESPIRATION RATE: 12 BRPM

## 2023-01-01 DIAGNOSIS — I25.2 OLD MYOCARDIAL INFARCTION: ICD-10-CM

## 2023-01-01 DIAGNOSIS — I95.9 HYPOTENSION, UNSPECIFIED: ICD-10-CM

## 2023-01-01 DIAGNOSIS — Z95.5 PRESENCE OF CORONARY ANGIOPLASTY IMPLANT AND GRAFT: ICD-10-CM

## 2023-01-01 DIAGNOSIS — F32.A DEPRESSION, UNSPECIFIED: ICD-10-CM

## 2023-01-01 DIAGNOSIS — E10.52 TYPE 1 DIABETES MELLITUS WITH DIABETIC PERIPHERAL ANGIOPATHY WITH GANGRENE: ICD-10-CM

## 2023-01-01 DIAGNOSIS — L12.0 BULLOUS PEMPHIGOID: ICD-10-CM

## 2023-01-01 DIAGNOSIS — I25.10 ATHEROSCLEROTIC HEART DISEASE OF NATIVE CORONARY ARTERY WITHOUT ANGINA PECTORIS: ICD-10-CM

## 2023-01-01 DIAGNOSIS — F40.240 CLAUSTROPHOBIA: ICD-10-CM

## 2023-01-01 DIAGNOSIS — Z79.890 HORMONE REPLACEMENT THERAPY: ICD-10-CM

## 2023-01-01 DIAGNOSIS — E03.9 HYPOTHYROIDISM, UNSPECIFIED: ICD-10-CM

## 2023-01-01 DIAGNOSIS — Z98.890 OTHER SPECIFIED POSTPROCEDURAL STATES: Chronic | ICD-10-CM

## 2023-01-01 DIAGNOSIS — G47.33 OBSTRUCTIVE SLEEP APNEA (ADULT) (PEDIATRIC): ICD-10-CM

## 2023-01-01 DIAGNOSIS — Z79.4 LONG TERM (CURRENT) USE OF INSULIN: ICD-10-CM

## 2023-01-01 DIAGNOSIS — Z95.1 PRESENCE OF AORTOCORONARY BYPASS GRAFT: ICD-10-CM

## 2023-01-01 DIAGNOSIS — Z96.651 PRESENCE OF RIGHT ARTIFICIAL KNEE JOINT: Chronic | ICD-10-CM

## 2023-01-01 DIAGNOSIS — L97.829 NON-PRESSURE CHRONIC ULCER OF OTHER PART OF LEFT LOWER LEG WITH UNSPECIFIED SEVERITY: ICD-10-CM

## 2023-01-01 DIAGNOSIS — Z79.02 LONG TERM (CURRENT) USE OF ANTITHROMBOTICS/ANTIPLATELETS: ICD-10-CM

## 2023-01-01 DIAGNOSIS — E10.51 TYPE 1 DIABETES MELLITUS WITH DIABETIC PERIPHERAL ANGIOPATHY WITHOUT GANGRENE: ICD-10-CM

## 2023-01-01 DIAGNOSIS — R52 PAIN, UNSPECIFIED: ICD-10-CM

## 2023-01-01 DIAGNOSIS — Z88.8 ALLERGY STATUS TO OTHER DRUGS, MEDICAMENTS AND BIOLOGICAL SUBSTANCES: ICD-10-CM

## 2023-01-01 DIAGNOSIS — N18.32 CHRONIC KIDNEY DISEASE, STAGE 3B: ICD-10-CM

## 2023-01-01 DIAGNOSIS — M25.50 PAIN IN UNSPECIFIED JOINT: ICD-10-CM

## 2023-01-01 DIAGNOSIS — I50.23 ACUTE ON CHRONIC SYSTOLIC (CONGESTIVE) HEART FAILURE: ICD-10-CM

## 2023-01-01 DIAGNOSIS — Z41.9 ENCOUNTER FOR PROCEDURE FOR PURPOSES OTHER THAN REMEDYING HEALTH STATE, UNSPECIFIED: Chronic | ICD-10-CM

## 2023-01-01 DIAGNOSIS — L97.529 NON-PRESSURE CHRONIC ULCER OF OTHER PART OF LEFT FOOT WITH UNSPECIFIED SEVERITY: ICD-10-CM

## 2023-01-01 DIAGNOSIS — E10.649 TYPE 1 DIABETES MELLITUS WITH HYPOGLYCEMIA WITHOUT COMA: ICD-10-CM

## 2023-01-01 DIAGNOSIS — Z87.19 PERSONAL HISTORY OF OTHER DISEASES OF THE DIGESTIVE SYSTEM: ICD-10-CM

## 2023-01-01 DIAGNOSIS — Z89.611 ACQUIRED ABSENCE OF RIGHT LEG ABOVE KNEE: ICD-10-CM

## 2023-01-01 DIAGNOSIS — I11.0 HYPERTENSIVE HEART DISEASE WITH HEART FAILURE: ICD-10-CM

## 2023-01-01 DIAGNOSIS — Z96.641 PRESENCE OF RIGHT ARTIFICIAL HIP JOINT: Chronic | ICD-10-CM

## 2023-01-01 DIAGNOSIS — I25.5 ISCHEMIC CARDIOMYOPATHY: ICD-10-CM

## 2023-01-01 DIAGNOSIS — E78.5 HYPERLIPIDEMIA, UNSPECIFIED: ICD-10-CM

## 2023-01-01 DIAGNOSIS — G25.81 RESTLESS LEGS SYNDROME: ICD-10-CM

## 2023-01-01 DIAGNOSIS — Z95.810 PRESENCE OF AUTOMATIC (IMPLANTABLE) CARDIAC DEFIBRILLATOR: ICD-10-CM

## 2023-01-01 DIAGNOSIS — A41.59 OTHER GRAM-NEGATIVE SEPSIS: ICD-10-CM

## 2023-01-01 DIAGNOSIS — M06.9 RHEUMATOID ARTHRITIS, UNSPECIFIED: ICD-10-CM

## 2023-01-01 DIAGNOSIS — I77.1 STRICTURE OF ARTERY: ICD-10-CM

## 2023-01-01 DIAGNOSIS — L97.929 NON-PRESSURE CHRONIC ULCER OF UNSPECIFIED PART OF LEFT LOWER LEG WITH UNSPECIFIED SEVERITY: ICD-10-CM

## 2023-01-01 DIAGNOSIS — L40.9 PSORIASIS, UNSPECIFIED: ICD-10-CM

## 2023-01-01 DIAGNOSIS — B96.1 KLEBSIELLA PNEUMONIAE [K. PNEUMONIAE] AS THE CAUSE OF DISEASES CLASSIFIED ELSEWHERE: ICD-10-CM

## 2023-01-01 DIAGNOSIS — R91.1 SOLITARY PULMONARY NODULE: ICD-10-CM

## 2023-01-01 DIAGNOSIS — A41.9 SEPSIS, UNSPECIFIED ORGANISM: ICD-10-CM

## 2023-01-01 DIAGNOSIS — E10.621 TYPE 1 DIABETES MELLITUS WITH FOOT ULCER: ICD-10-CM

## 2023-01-01 DIAGNOSIS — E10.22 TYPE 1 DIABETES MELLITUS WITH DIABETIC CHRONIC KIDNEY DISEASE: ICD-10-CM

## 2023-01-01 DIAGNOSIS — E11.51 TYPE 2 DIABETES MELLITUS WITH DIABETIC PERIPHERAL ANGIOPATHY WITHOUT GANGRENE: ICD-10-CM

## 2023-01-01 DIAGNOSIS — Z95.810 PRESENCE OF AUTOMATIC (IMPLANTABLE) CARDIAC DEFIBRILLATOR: Chronic | ICD-10-CM

## 2023-01-01 DIAGNOSIS — I27.20 PULMONARY HYPERTENSION, UNSPECIFIED: ICD-10-CM

## 2023-01-01 DIAGNOSIS — Z87.891 PERSONAL HISTORY OF NICOTINE DEPENDENCE: ICD-10-CM

## 2023-01-01 DIAGNOSIS — E10.40 TYPE 1 DIABETES MELLITUS WITH DIABETIC NEUROPATHY, UNSPECIFIED: ICD-10-CM

## 2023-01-01 DIAGNOSIS — S09.90XA UNSPECIFIED INJURY OF HEAD, INITIAL ENCOUNTER: ICD-10-CM

## 2023-01-01 DIAGNOSIS — Z95.820 PERIPHERAL VASCULAR ANGIOPLASTY STATUS WITH IMPLANTS AND GRAFTS: ICD-10-CM

## 2023-01-01 DIAGNOSIS — Z79.82 LONG TERM (CURRENT) USE OF ASPIRIN: ICD-10-CM

## 2023-01-01 DIAGNOSIS — Z95.1 PRESENCE OF AORTOCORONARY BYPASS GRAFT: Chronic | ICD-10-CM

## 2023-01-01 DIAGNOSIS — I25.9 CHRONIC ISCHEMIC HEART DISEASE, UNSPECIFIED: ICD-10-CM

## 2023-01-01 DIAGNOSIS — M19.90 UNSPECIFIED OSTEOARTHRITIS, UNSPECIFIED SITE: ICD-10-CM

## 2023-01-01 DIAGNOSIS — Z96.41 PRESENCE OF INSULIN PUMP (EXTERNAL) (INTERNAL): ICD-10-CM

## 2023-01-01 DIAGNOSIS — N17.9 ACUTE KIDNEY FAILURE, UNSPECIFIED: ICD-10-CM

## 2023-01-01 DIAGNOSIS — Z43.4 ENCOUNTER FOR ATTENTION TO OTHER ARTIFICIAL OPENINGS OF DIGESTIVE TRACT: Chronic | ICD-10-CM

## 2023-01-01 DIAGNOSIS — R21 RASH AND OTHER NONSPECIFIC SKIN ERUPTION: ICD-10-CM

## 2023-01-01 DIAGNOSIS — L03.113 CELLULITIS OF RIGHT UPPER LIMB: ICD-10-CM

## 2023-01-01 DIAGNOSIS — E11.42 TYPE 2 DIABETES MELLITUS WITH DIABETIC POLYNEUROPATHY: ICD-10-CM

## 2023-01-01 DIAGNOSIS — F41.9 ANXIETY DISORDER, UNSPECIFIED: ICD-10-CM

## 2023-01-01 DIAGNOSIS — Z95.5 PRESENCE OF CORONARY ANGIOPLASTY IMPLANT AND GRAFT: Chronic | ICD-10-CM

## 2023-01-01 DIAGNOSIS — J96.02 ACUTE RESPIRATORY FAILURE WITH HYPERCAPNIA: ICD-10-CM

## 2023-01-01 DIAGNOSIS — Z41.8 ENCOUNTER FOR OTHER PROCEDURES FOR PURPOSES OTHER THAN REMEDYING HEALTH STATE: ICD-10-CM

## 2023-01-01 DIAGNOSIS — I13.10 HYPERTENSIVE HEART AND CHRONIC KIDNEY DISEASE WITHOUT HEART FAILURE, WITH STAGE 1 THROUGH STAGE 4 CHRONIC KIDNEY DISEASE, OR UNSPECIFIED CHRONIC KIDNEY DISEASE: ICD-10-CM

## 2023-01-01 DIAGNOSIS — Z96.641 PRESENCE OF RIGHT ARTIFICIAL HIP JOINT: ICD-10-CM

## 2023-01-01 DIAGNOSIS — E10.9 TYPE 1 DIABETES MELLITUS W/OUT COMPLICATIONS: ICD-10-CM

## 2023-01-01 DIAGNOSIS — Z86.74 PERSONAL HISTORY OF SUDDEN CARDIAC ARREST: ICD-10-CM

## 2023-01-01 DIAGNOSIS — I47.29 OTHER VENTRICULAR TACHYCARDIA: ICD-10-CM

## 2023-01-01 DIAGNOSIS — L98.499 NON-PRESSURE CHRONIC ULCER OF SKIN OF OTHER SITES WITH UNSPECIFIED SEVERITY: ICD-10-CM

## 2023-01-01 DIAGNOSIS — F32.9 MAJOR DEPRESSIVE DISORDER, SINGLE EPISODE, UNSPECIFIED: ICD-10-CM

## 2023-01-01 DIAGNOSIS — I50.21 ACUTE SYSTOLIC (CONGESTIVE) HEART FAILURE: ICD-10-CM

## 2023-01-01 DIAGNOSIS — I42.9 CARDIOMYOPATHY, UNSPECIFIED: ICD-10-CM

## 2023-01-01 DIAGNOSIS — Z90.89 ACQUIRED ABSENCE OF OTHER ORGANS: Chronic | ICD-10-CM

## 2023-01-01 DIAGNOSIS — I13.0 HYPERTENSIVE HEART AND CHRONIC KIDNEY DISEASE WITH HEART FAILURE AND STAGE 1 THROUGH STAGE 4 CHRONIC KIDNEY DISEASE, OR UNSPECIFIED CHRONIC KIDNEY DISEASE: ICD-10-CM

## 2023-01-01 DIAGNOSIS — F43.20 ADJUSTMENT DISORDER, UNSPECIFIED: ICD-10-CM

## 2023-01-01 DIAGNOSIS — R13.10 DYSPHAGIA, UNSPECIFIED: ICD-10-CM

## 2023-01-01 DIAGNOSIS — Z88.1 ALLERGY STATUS TO OTHER ANTIBIOTIC AGENTS STATUS: ICD-10-CM

## 2023-01-01 DIAGNOSIS — I70.202 UNSPECIFIED ATHEROSCLEROSIS OF NATIVE ARTERIES OF EXTREMITIES, LEFT LEG: ICD-10-CM

## 2023-01-01 DIAGNOSIS — Z51.5 ENCOUNTER FOR PALLIATIVE CARE: ICD-10-CM

## 2023-01-01 DIAGNOSIS — Z88.8 ALLERGY STATUS TO OTHER DRUGS, MEDICAMENTS AND BIOLOGICAL SUBSTANCES STATUS: ICD-10-CM

## 2023-01-01 DIAGNOSIS — R06.00 DYSPNEA, UNSPECIFIED: ICD-10-CM

## 2023-01-01 DIAGNOSIS — E11.65 TYPE 2 DIABETES MELLITUS WITH HYPERGLYCEMIA: ICD-10-CM

## 2023-01-01 DIAGNOSIS — Z95.828 PRESENCE OF OTHER VASCULAR IMPLANTS AND GRAFTS: ICD-10-CM

## 2023-01-01 DIAGNOSIS — L03.119 CELLULITIS OF UNSPECIFIED PART OF LIMB: ICD-10-CM

## 2023-01-01 DIAGNOSIS — D50.9 IRON DEFICIENCY ANEMIA, UNSPECIFIED: ICD-10-CM

## 2023-01-01 DIAGNOSIS — Z46.81 ENCOUNTER FOR FITTING AND ADJUSTMENT OF INSULIN PUMP: ICD-10-CM

## 2023-01-01 DIAGNOSIS — N39.0 URINARY TRACT INFECTION, SITE NOT SPECIFIED: ICD-10-CM

## 2023-01-01 DIAGNOSIS — D63.1 ANEMIA IN CHRONIC KIDNEY DISEASE: ICD-10-CM

## 2023-01-01 DIAGNOSIS — I50.22 CHRONIC SYSTOLIC (CONGESTIVE) HEART FAILURE: ICD-10-CM

## 2023-01-01 DIAGNOSIS — J96.01 ACUTE RESPIRATORY FAILURE WITH HYPOXIA: ICD-10-CM

## 2023-01-01 DIAGNOSIS — K90.0 CELIAC DISEASE: ICD-10-CM

## 2023-01-01 DIAGNOSIS — R74.01 ELEVATION OF LEVELS OF LIVER TRANSAMINASE LEVELS: ICD-10-CM

## 2023-01-01 DIAGNOSIS — Z96.651 PRESENCE OF RIGHT ARTIFICIAL KNEE JOINT: ICD-10-CM

## 2023-01-01 DIAGNOSIS — D64.9 ANEMIA, UNSPECIFIED: ICD-10-CM

## 2023-01-01 DIAGNOSIS — F41.8 OTHER SPECIFIED ANXIETY DISORDERS: ICD-10-CM

## 2023-01-01 DIAGNOSIS — L03.116 CELLULITIS OF LEFT LOWER LIMB: ICD-10-CM

## 2023-01-01 DIAGNOSIS — I47.20 VENTRICULAR TACHYCARDIA, UNSPECIFIED: ICD-10-CM

## 2023-01-01 DIAGNOSIS — R64 CACHEXIA: ICD-10-CM

## 2023-01-01 DIAGNOSIS — I70.201 UNSPECIFIED ATHEROSCLEROSIS OF NATIVE ARTERIES OF EXTREMITIES, RIGHT LEG: ICD-10-CM

## 2023-01-01 DIAGNOSIS — Z79.899 OTHER LONG TERM (CURRENT) DRUG THERAPY: ICD-10-CM

## 2023-01-01 DIAGNOSIS — Z90.89 ACQUIRED ABSENCE OF OTHER ORGANS: ICD-10-CM

## 2023-01-01 DIAGNOSIS — I24.8 OTHER FORMS OF ACUTE ISCHEMIC HEART DISEASE: ICD-10-CM

## 2023-01-01 DIAGNOSIS — G93.41 METABOLIC ENCEPHALOPATHY: ICD-10-CM

## 2023-01-01 DIAGNOSIS — R57.0 CARDIOGENIC SHOCK: ICD-10-CM

## 2023-01-01 DIAGNOSIS — E11.40 TYPE 2 DIABETES MELLITUS WITH DIABETIC NEUROPATHY, UNSPECIFIED: ICD-10-CM

## 2023-01-01 DIAGNOSIS — Z89.511 ACQUIRED ABSENCE OF RIGHT LEG BELOW KNEE: ICD-10-CM

## 2023-01-01 DIAGNOSIS — Z87.2 PERSONAL HISTORY OF DISEASES OF THE SKIN AND SUBCUTANEOUS TISSUE: ICD-10-CM

## 2023-01-01 DIAGNOSIS — I21.A1 MYOCARDIAL INFARCTION TYPE 2: ICD-10-CM

## 2023-01-01 DIAGNOSIS — I25.10 ATHEROSCLEROTIC HEART DISEASE OF NATIVE CORONARY ARTERY W/OUT ANGINA PECTORIS: ICD-10-CM
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
## 2023-01-01 DIAGNOSIS — E87.5 HYPERKALEMIA: ICD-10-CM

## 2023-01-01 DIAGNOSIS — E87.70 FLUID OVERLOAD, UNSPECIFIED: ICD-10-CM

## 2023-01-01 DIAGNOSIS — E78.00 PURE HYPERCHOLESTEROLEMIA, UNSPECIFIED: ICD-10-CM

## 2023-01-01 DIAGNOSIS — R77.8 OTHER SPECIFIED ABNORMALITIES OF PLASMA PROTEINS: ICD-10-CM

## 2023-01-01 DIAGNOSIS — E10.622 TYPE 1 DIABETES MELLITUS WITH OTHER SKIN ULCER: ICD-10-CM

## 2023-01-01 DIAGNOSIS — M85.80 OTHER SPECIFIED DISORDERS OF BONE DENSITY AND STRUCTURE, UNSPECIFIED SITE: ICD-10-CM

## 2023-01-01 DIAGNOSIS — M19.91 PRIMARY OSTEOARTHRITIS, UNSPECIFIED SITE: ICD-10-CM

## 2023-01-01 DIAGNOSIS — E43 UNSPECIFIED SEVERE PROTEIN-CALORIE MALNUTRITION: ICD-10-CM

## 2023-01-01 DIAGNOSIS — R74.8 ABNORMAL LEVELS OF OTHER SERUM ENZYMES: ICD-10-CM

## 2023-01-01 DIAGNOSIS — R41.82 ALTERED MENTAL STATUS, UNSPECIFIED: ICD-10-CM

## 2023-01-01 DIAGNOSIS — E87.0 HYPEROSMOLALITY AND HYPERNATREMIA: ICD-10-CM

## 2023-01-01 DIAGNOSIS — R45.1 RESTLESSNESS AND AGITATION: ICD-10-CM

## 2023-01-01 DIAGNOSIS — Z45.02 ENCOUNTER FOR ADJUSTMENT AND MANAGEMENT OF AUTOMATIC IMPLANTABLE CARDIAC DEFIBRILLATOR: ICD-10-CM

## 2023-01-01 DIAGNOSIS — Z90.49 ACQUIRED ABSENCE OF OTHER SPECIFIED PARTS OF DIGESTIVE TRACT: ICD-10-CM

## 2023-01-01 DIAGNOSIS — R19.7 DIARRHEA, UNSPECIFIED: ICD-10-CM

## 2023-01-01 DIAGNOSIS — E11.51 TYPE 2 DIABETES MELLITUS WITH DIABETIC PERIPHERAL ANGIOPATHY W/OUT GANGRENE: ICD-10-CM

## 2023-01-01 DIAGNOSIS — D69.6 THROMBOCYTOPENIA, UNSPECIFIED: ICD-10-CM

## 2023-01-01 DIAGNOSIS — R18.8 OTHER ASCITES: ICD-10-CM

## 2023-01-01 DIAGNOSIS — I50.20 UNSPECIFIED SYSTOLIC (CONGESTIVE) HEART FAILURE: ICD-10-CM
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
## 2023-01-01 DIAGNOSIS — L97.129 NON-PRESSURE CHRONIC ULCER OF LEFT THIGH WITH UNSPECIFIED SEVERITY: ICD-10-CM

## 2023-01-01 DIAGNOSIS — R65.21 SEVERE SEPSIS WITH SEPTIC SHOCK: ICD-10-CM

## 2023-01-01 DIAGNOSIS — Z71.89 OTHER SPECIFIED COUNSELING: ICD-10-CM

## 2023-01-01 DIAGNOSIS — B35.3 TINEA PEDIS: ICD-10-CM

## 2023-01-01 DIAGNOSIS — R33.8 OTHER RETENTION OF URINE: ICD-10-CM

## 2023-01-01 DIAGNOSIS — K21.00 GASTRO-ESOPHAGEAL REFLUX DISEASE WITH ESOPHAGITIS, WITHOUT BLEEDING: ICD-10-CM

## 2023-01-01 DIAGNOSIS — I08.1 RHEUMATIC DISORDERS OF BOTH MITRAL AND TRICUSPID VALVES: ICD-10-CM

## 2023-01-01 DIAGNOSIS — E87.20 ACIDOSIS, UNSPECIFIED: ICD-10-CM

## 2023-01-01 DIAGNOSIS — N40.1 BENIGN PROSTATIC HYPERPLASIA WITH LOWER URINARY TRACT SYMPTOMS: ICD-10-CM

## 2023-01-01 DIAGNOSIS — Z98.890 OTHER SPECIFIED POSTPROCEDURAL STATES: ICD-10-CM

## 2023-01-01 LAB
% ALBUMIN: SIGNIFICANT CHANGE UP %
% ALPHA 1: SIGNIFICANT CHANGE UP %
% ALPHA 2: SIGNIFICANT CHANGE UP %
% BETA: SIGNIFICANT CHANGE UP %
% GAMMA, URINE: 16.1 % — SIGNIFICANT CHANGE UP
% GAMMA: SIGNIFICANT CHANGE UP %
-  AMIKACIN: SIGNIFICANT CHANGE UP
-  AMIKACIN: SIGNIFICANT CHANGE UP
-  AMOXICILLIN/CLAVULANIC ACID: SIGNIFICANT CHANGE UP
-  AMOXICILLIN/CLAVULANIC ACID: SIGNIFICANT CHANGE UP
-  AMPICILLIN/SULBACTAM: SIGNIFICANT CHANGE UP
-  AMPICILLIN: SIGNIFICANT CHANGE UP
-  AMPICILLIN: SIGNIFICANT CHANGE UP
-  AZTREONAM: SIGNIFICANT CHANGE UP
-  AZTREONAM: SIGNIFICANT CHANGE UP
-  CEFAZOLIN: SIGNIFICANT CHANGE UP
-  CEFEPIME: SIGNIFICANT CHANGE UP
-  CEFEPIME: SIGNIFICANT CHANGE UP
-  CEFTRIAXONE: SIGNIFICANT CHANGE UP
-  CEFTRIAXONE: SIGNIFICANT CHANGE UP
-  CEFUROXIME: SIGNIFICANT CHANGE UP
-  CEFUROXIME: SIGNIFICANT CHANGE UP
-  CIPROFLOXACIN: SIGNIFICANT CHANGE UP
-  CIPROFLOXACIN: SIGNIFICANT CHANGE UP
-  CLINDAMYCIN: SIGNIFICANT CHANGE UP
-  CLINDAMYCIN: SIGNIFICANT CHANGE UP
-  DAPTOMYCIN: SIGNIFICANT CHANGE UP
-  DAPTOMYCIN: SIGNIFICANT CHANGE UP
-  ERTAPENEM: SIGNIFICANT CHANGE UP
-  ERTAPENEM: SIGNIFICANT CHANGE UP
-  ERYTHROMYCIN: SIGNIFICANT CHANGE UP
-  ERYTHROMYCIN: SIGNIFICANT CHANGE UP
-  GENTAMICIN: SIGNIFICANT CHANGE UP
-  IMIPENEM: SIGNIFICANT CHANGE UP
-  IMIPENEM: SIGNIFICANT CHANGE UP
-  LEVOFLOXACIN: SIGNIFICANT CHANGE UP
-  LEVOFLOXACIN: SIGNIFICANT CHANGE UP
-  LINEZOLID: SIGNIFICANT CHANGE UP
-  LINEZOLID: SIGNIFICANT CHANGE UP
-  MEROPENEM: SIGNIFICANT CHANGE UP
-  MEROPENEM: SIGNIFICANT CHANGE UP
-  NITROFURANTOIN: SIGNIFICANT CHANGE UP
-  NITROFURANTOIN: SIGNIFICANT CHANGE UP
-  OXACILLIN: SIGNIFICANT CHANGE UP
-  OXACILLIN: SIGNIFICANT CHANGE UP
-  PENICILLIN: SIGNIFICANT CHANGE UP
-  PENICILLIN: SIGNIFICANT CHANGE UP
-  PIPERACILLIN/TAZOBACTAM: SIGNIFICANT CHANGE UP
-  PIPERACILLIN/TAZOBACTAM: SIGNIFICANT CHANGE UP
-  RIFAMPIN: SIGNIFICANT CHANGE UP
-  RIFAMPIN: SIGNIFICANT CHANGE UP
-  TETRACYCLINE: SIGNIFICANT CHANGE UP
-  TETRACYCLINE: SIGNIFICANT CHANGE UP
-  TOBRAMYCIN: SIGNIFICANT CHANGE UP
-  TOBRAMYCIN: SIGNIFICANT CHANGE UP
-  TRIMETHOPRIM/SULFAMETHOXAZOLE: SIGNIFICANT CHANGE UP
-  VANCOMYCIN: SIGNIFICANT CHANGE UP
-  VANCOMYCIN: SIGNIFICANT CHANGE UP
A1C WITH ESTIMATED AVERAGE GLUCOSE RESULT: 7.3 % — HIGH (ref 4–5.6)
A1C WITH ESTIMATED AVERAGE GLUCOSE RESULT: 7.3 % — HIGH (ref 4–5.6)
A1C WITH ESTIMATED AVERAGE GLUCOSE RESULT: 8.7 % — HIGH (ref 4–5.6)
A1C WITH ESTIMATED AVERAGE GLUCOSE RESULT: 9.2 % — HIGH (ref 4–5.6)
A1C WITH ESTIMATED AVERAGE GLUCOSE RESULT: 9.8 % — HIGH (ref 4–5.6)
ACANTHOCYTES BLD QL SMEAR: SLIGHT — SIGNIFICANT CHANGE UP
ACANTHOCYTES BLD QL SMEAR: SLIGHT — SIGNIFICANT CHANGE UP
ALBUMIN 24H MFR UR ELPH: 36.6 % — SIGNIFICANT CHANGE UP
ALBUMIN SERPL ELPH-MCNC: 2 G/DL — LOW (ref 3.5–5.2)
ALBUMIN SERPL ELPH-MCNC: 2 G/DL — LOW (ref 3.5–5.2)
ALBUMIN SERPL ELPH-MCNC: 2.5 G/DL — LOW (ref 3.5–5.2)
ALBUMIN SERPL ELPH-MCNC: 2.5 G/DL — LOW (ref 3.5–5.2)
ALBUMIN SERPL ELPH-MCNC: 2.6 G/DL — LOW (ref 3.5–5.2)
ALBUMIN SERPL ELPH-MCNC: 2.6 G/DL — LOW (ref 3.5–5.2)
ALBUMIN SERPL ELPH-MCNC: 2.9 G/DL — LOW (ref 3.5–5.2)
ALBUMIN SERPL ELPH-MCNC: 3 G/DL — LOW (ref 3.5–5.2)
ALBUMIN SERPL ELPH-MCNC: 3.1 G/DL — LOW (ref 3.5–5.2)
ALBUMIN SERPL ELPH-MCNC: 3.2 G/DL — LOW (ref 3.5–5.2)
ALBUMIN SERPL ELPH-MCNC: 3.3 G/DL — LOW (ref 3.5–5.2)
ALBUMIN SERPL ELPH-MCNC: 3.4 G/DL — LOW (ref 3.5–5.2)
ALBUMIN SERPL ELPH-MCNC: 3.5 G/DL — SIGNIFICANT CHANGE UP (ref 3.5–5.2)
ALBUMIN SERPL ELPH-MCNC: 3.5 G/DL — SIGNIFICANT CHANGE UP (ref 3.5–5.2)
ALBUMIN SERPL ELPH-MCNC: 3.6 G/DL — SIGNIFICANT CHANGE UP (ref 3.5–5.2)
ALBUMIN SERPL ELPH-MCNC: 3.8 G/DL
ALBUMIN SERPL ELPH-MCNC: 3.8 G/DL — SIGNIFICANT CHANGE UP (ref 3.5–5.2)
ALBUMIN SERPL ELPH-MCNC: SIGNIFICANT CHANGE UP G/DL (ref 3.6–5.5)
ALDOLASE SERPL-CCNC: 7.4 U/L — SIGNIFICANT CHANGE UP (ref 3.3–10.3)
ALP BLD-CCNC: 254 U/L
ALP SERPL-CCNC: 100 U/L — SIGNIFICANT CHANGE UP (ref 30–115)
ALP SERPL-CCNC: 104 U/L — SIGNIFICANT CHANGE UP (ref 30–115)
ALP SERPL-CCNC: 109 U/L — SIGNIFICANT CHANGE UP (ref 30–115)
ALP SERPL-CCNC: 109 U/L — SIGNIFICANT CHANGE UP (ref 30–115)
ALP SERPL-CCNC: 110 U/L — SIGNIFICANT CHANGE UP (ref 30–115)
ALP SERPL-CCNC: 111 U/L — SIGNIFICANT CHANGE UP (ref 30–115)
ALP SERPL-CCNC: 113 U/L — SIGNIFICANT CHANGE UP (ref 30–115)
ALP SERPL-CCNC: 114 U/L — SIGNIFICANT CHANGE UP (ref 30–115)
ALP SERPL-CCNC: 115 U/L — SIGNIFICANT CHANGE UP (ref 30–115)
ALP SERPL-CCNC: 118 U/L — HIGH (ref 30–115)
ALP SERPL-CCNC: 119 U/L — HIGH (ref 30–115)
ALP SERPL-CCNC: 122 U/L — HIGH (ref 30–115)
ALP SERPL-CCNC: 123 U/L — HIGH (ref 30–115)
ALP SERPL-CCNC: 124 U/L — HIGH (ref 30–115)
ALP SERPL-CCNC: 125 U/L — HIGH (ref 30–115)
ALP SERPL-CCNC: 126 U/L — HIGH (ref 30–115)
ALP SERPL-CCNC: 128 U/L — HIGH (ref 30–115)
ALP SERPL-CCNC: 130 U/L — HIGH (ref 30–115)
ALP SERPL-CCNC: 131 U/L — HIGH (ref 30–115)
ALP SERPL-CCNC: 133 U/L — HIGH (ref 30–115)
ALP SERPL-CCNC: 134 U/L — HIGH (ref 30–115)
ALP SERPL-CCNC: 135 U/L — HIGH (ref 30–115)
ALP SERPL-CCNC: 142 U/L — HIGH (ref 30–115)
ALP SERPL-CCNC: 161 U/L — HIGH (ref 30–115)
ALP SERPL-CCNC: 164 U/L — HIGH (ref 30–115)
ALP SERPL-CCNC: 165 U/L — HIGH (ref 30–115)
ALP SERPL-CCNC: 165 U/L — HIGH (ref 30–115)
ALP SERPL-CCNC: 194 U/L — HIGH (ref 30–115)
ALP SERPL-CCNC: 195 U/L — HIGH (ref 30–115)
ALP SERPL-CCNC: 196 U/L — HIGH (ref 30–115)
ALP SERPL-CCNC: 199 U/L — HIGH (ref 30–115)
ALP SERPL-CCNC: 199 U/L — HIGH (ref 30–115)
ALP SERPL-CCNC: 248 U/L — HIGH (ref 30–115)
ALP SERPL-CCNC: 248 U/L — HIGH (ref 30–115)
ALP SERPL-CCNC: 78 U/L — SIGNIFICANT CHANGE UP (ref 30–115)
ALP SERPL-CCNC: 93 U/L — SIGNIFICANT CHANGE UP (ref 30–115)
ALP SERPL-CCNC: 93 U/L — SIGNIFICANT CHANGE UP (ref 30–115)
ALP SERPL-CCNC: 94 U/L — SIGNIFICANT CHANGE UP (ref 30–115)
ALP SERPL-CCNC: 99 U/L — SIGNIFICANT CHANGE UP (ref 30–115)
ALP SERPL-CCNC: 99 U/L — SIGNIFICANT CHANGE UP (ref 30–115)
ALPHA1 GLOB 24H MFR UR ELPH: 21.3 % — SIGNIFICANT CHANGE UP
ALPHA1 GLOB SERPL ELPH-MCNC: SIGNIFICANT CHANGE UP G/DL (ref 0.1–0.4)
ALPHA2 GLOB 24H MFR UR ELPH: 10.6 % — SIGNIFICANT CHANGE UP
ALPHA2 GLOB SERPL ELPH-MCNC: SIGNIFICANT CHANGE UP G/DL (ref 0.5–1)
ALT FLD-CCNC: 16 U/L — SIGNIFICANT CHANGE UP (ref 0–41)
ALT FLD-CCNC: 16 U/L — SIGNIFICANT CHANGE UP (ref 0–41)
ALT FLD-CCNC: 17 U/L — SIGNIFICANT CHANGE UP (ref 0–41)
ALT FLD-CCNC: 18 U/L — SIGNIFICANT CHANGE UP (ref 0–41)
ALT FLD-CCNC: 18 U/L — SIGNIFICANT CHANGE UP (ref 0–41)
ALT FLD-CCNC: 19 U/L — SIGNIFICANT CHANGE UP (ref 0–41)
ALT FLD-CCNC: 20 U/L — SIGNIFICANT CHANGE UP (ref 0–41)
ALT FLD-CCNC: 21 U/L — SIGNIFICANT CHANGE UP (ref 0–41)
ALT FLD-CCNC: 22 U/L — SIGNIFICANT CHANGE UP (ref 0–41)
ALT FLD-CCNC: 23 U/L — SIGNIFICANT CHANGE UP (ref 0–41)
ALT FLD-CCNC: 23 U/L — SIGNIFICANT CHANGE UP (ref 0–41)
ALT FLD-CCNC: 24 U/L — SIGNIFICANT CHANGE UP (ref 0–41)
ALT FLD-CCNC: 25 U/L — SIGNIFICANT CHANGE UP (ref 0–41)
ALT FLD-CCNC: 26 U/L — SIGNIFICANT CHANGE UP (ref 0–41)
ALT FLD-CCNC: 26 U/L — SIGNIFICANT CHANGE UP (ref 0–41)
ALT FLD-CCNC: 27 U/L — SIGNIFICANT CHANGE UP (ref 0–41)
ALT FLD-CCNC: 30 U/L — SIGNIFICANT CHANGE UP (ref 0–41)
ALT FLD-CCNC: 33 U/L — SIGNIFICANT CHANGE UP (ref 0–41)
ALT FLD-CCNC: 33 U/L — SIGNIFICANT CHANGE UP (ref 0–41)
ALT FLD-CCNC: 34 U/L — SIGNIFICANT CHANGE UP (ref 0–41)
ALT FLD-CCNC: 37 U/L — SIGNIFICANT CHANGE UP (ref 0–41)
ALT FLD-CCNC: 39 U/L — SIGNIFICANT CHANGE UP (ref 0–41)
ALT FLD-CCNC: 41 U/L — SIGNIFICANT CHANGE UP (ref 0–41)
ALT FLD-CCNC: 41 U/L — SIGNIFICANT CHANGE UP (ref 0–41)
ALT FLD-CCNC: 42 U/L — HIGH (ref 0–41)
ALT FLD-CCNC: 43 U/L — HIGH (ref 0–41)
ALT FLD-CCNC: 43 U/L — HIGH (ref 0–41)
ALT FLD-CCNC: 45 U/L — HIGH (ref 0–41)
ALT FLD-CCNC: 46 U/L — HIGH (ref 0–41)
ALT FLD-CCNC: 54 U/L — HIGH (ref 0–41)
ALT FLD-CCNC: 545 U/L — HIGH (ref 0–41)
ALT FLD-CCNC: 545 U/L — HIGH (ref 0–41)
ALT FLD-CCNC: 58 U/L — HIGH (ref 0–41)
ALT FLD-CCNC: 63 U/L — HIGH (ref 0–41)
ALT FLD-CCNC: 63 U/L — HIGH (ref 0–41)
ALT FLD-CCNC: 69 U/L — HIGH (ref 0–41)
ALT FLD-CCNC: 78 U/L — HIGH (ref 0–41)
ALT FLD-CCNC: 87 U/L — HIGH (ref 0–41)
ALT SERPL-CCNC: 80 U/L
AMMONIA BLD-MCNC: 14 UMOL/L — SIGNIFICANT CHANGE UP (ref 11–55)
AMMONIA BLD-MCNC: 29 UMOL/L — SIGNIFICANT CHANGE UP (ref 11–55)
ANA PAT FLD IF-IMP: ABNORMAL
ANA PAT FLD IF-IMP: ABNORMAL
ANA TITR SER: ABNORMAL
ANA TITR SER: ABNORMAL
ANION GAP SERPL CALC-SCNC: 10 MMOL/L — SIGNIFICANT CHANGE UP (ref 7–14)
ANION GAP SERPL CALC-SCNC: 11 MMOL/L — SIGNIFICANT CHANGE UP (ref 7–14)
ANION GAP SERPL CALC-SCNC: 12 MMOL/L — SIGNIFICANT CHANGE UP (ref 7–14)
ANION GAP SERPL CALC-SCNC: 13 MMOL/L — SIGNIFICANT CHANGE UP (ref 7–14)
ANION GAP SERPL CALC-SCNC: 14 MMOL/L
ANION GAP SERPL CALC-SCNC: 14 MMOL/L — SIGNIFICANT CHANGE UP (ref 7–14)
ANION GAP SERPL CALC-SCNC: 15 MMOL/L — HIGH (ref 7–14)
ANION GAP SERPL CALC-SCNC: 16 MMOL/L — HIGH (ref 7–14)
ANION GAP SERPL CALC-SCNC: 17 MMOL/L — HIGH (ref 7–14)
ANION GAP SERPL CALC-SCNC: 18 MMOL/L — HIGH (ref 7–14)
ANION GAP SERPL CALC-SCNC: 18 MMOL/L — HIGH (ref 7–14)
ANION GAP SERPL CALC-SCNC: 20 MMOL/L — HIGH (ref 7–14)
ANION GAP SERPL CALC-SCNC: 20 MMOL/L — HIGH (ref 7–14)
ANION GAP SERPL CALC-SCNC: 21 MMOL/L — HIGH (ref 7–14)
ANION GAP SERPL CALC-SCNC: 22 MMOL/L — HIGH (ref 7–14)
ANION GAP SERPL CALC-SCNC: 25 MMOL/L — HIGH (ref 7–14)
ANION GAP SERPL CALC-SCNC: 34 MMOL/L — HIGH (ref 7–14)
ANION GAP SERPL CALC-SCNC: 34 MMOL/L — HIGH (ref 7–14)
ANION GAP SERPL CALC-SCNC: 5 MMOL/L — LOW (ref 7–14)
ANION GAP SERPL CALC-SCNC: 7 MMOL/L — SIGNIFICANT CHANGE UP (ref 7–14)
ANION GAP SERPL CALC-SCNC: 8 MMOL/L — SIGNIFICANT CHANGE UP (ref 7–14)
ANION GAP SERPL CALC-SCNC: 8 MMOL/L — SIGNIFICANT CHANGE UP (ref 7–14)
ANION GAP SERPL CALC-SCNC: 9 MMOL/L — SIGNIFICANT CHANGE UP (ref 7–14)
ANISOCYTOSIS BLD QL: SIGNIFICANT CHANGE UP
ANISOCYTOSIS BLD QL: SLIGHT — SIGNIFICANT CHANGE UP
ANTI-RIBONUCLEAR PROTEIN: 0.5 AI — SIGNIFICANT CHANGE UP
APPEARANCE UR: ABNORMAL
APPEARANCE UR: CLEAR — SIGNIFICANT CHANGE UP
APTT BLD: 26.2 SEC — LOW (ref 27–39.2)
APTT BLD: 26.2 SEC — LOW (ref 27–39.2)
APTT BLD: 26.5 SEC — LOW (ref 27–39.2)
APTT BLD: 27.5 SEC — SIGNIFICANT CHANGE UP (ref 27–39.2)
APTT BLD: 27.9 SEC — SIGNIFICANT CHANGE UP (ref 27–39.2)
APTT BLD: 27.9 SEC — SIGNIFICANT CHANGE UP (ref 27–39.2)
APTT BLD: 28.1 SEC — SIGNIFICANT CHANGE UP (ref 27–39.2)
APTT BLD: 29.7 SEC — SIGNIFICANT CHANGE UP (ref 27–39.2)
APTT BLD: 30.9 SEC — SIGNIFICANT CHANGE UP (ref 27–39.2)
APTT BLD: 32.6 SEC — SIGNIFICANT CHANGE UP (ref 27–39.2)
APTT BLD: 32.6 SEC — SIGNIFICANT CHANGE UP (ref 27–39.2)
AST SERPL-CCNC: 122 U/L — HIGH (ref 0–41)
AST SERPL-CCNC: 152 U/L — HIGH (ref 0–41)
AST SERPL-CCNC: 18 U/L — SIGNIFICANT CHANGE UP (ref 0–41)
AST SERPL-CCNC: 20 U/L — SIGNIFICANT CHANGE UP (ref 0–41)
AST SERPL-CCNC: 21 U/L — SIGNIFICANT CHANGE UP (ref 0–41)
AST SERPL-CCNC: 22 U/L — SIGNIFICANT CHANGE UP (ref 0–41)
AST SERPL-CCNC: 22 U/L — SIGNIFICANT CHANGE UP (ref 0–41)
AST SERPL-CCNC: 23 U/L — SIGNIFICANT CHANGE UP (ref 0–41)
AST SERPL-CCNC: 24 U/L — SIGNIFICANT CHANGE UP (ref 0–41)
AST SERPL-CCNC: 25 U/L — SIGNIFICANT CHANGE UP (ref 0–41)
AST SERPL-CCNC: 27 U/L — SIGNIFICANT CHANGE UP (ref 0–41)
AST SERPL-CCNC: 28 U/L — SIGNIFICANT CHANGE UP (ref 0–41)
AST SERPL-CCNC: 30 U/L — SIGNIFICANT CHANGE UP (ref 0–41)
AST SERPL-CCNC: 30 U/L — SIGNIFICANT CHANGE UP (ref 0–41)
AST SERPL-CCNC: 32 U/L — SIGNIFICANT CHANGE UP (ref 0–41)
AST SERPL-CCNC: 32 U/L — SIGNIFICANT CHANGE UP (ref 0–41)
AST SERPL-CCNC: 33 U/L — SIGNIFICANT CHANGE UP (ref 0–41)
AST SERPL-CCNC: 34 U/L — SIGNIFICANT CHANGE UP (ref 0–41)
AST SERPL-CCNC: 35 U/L — SIGNIFICANT CHANGE UP (ref 0–41)
AST SERPL-CCNC: 39 U/L — SIGNIFICANT CHANGE UP (ref 0–41)
AST SERPL-CCNC: 40 U/L — SIGNIFICANT CHANGE UP (ref 0–41)
AST SERPL-CCNC: 40 U/L — SIGNIFICANT CHANGE UP (ref 0–41)
AST SERPL-CCNC: 41 U/L — SIGNIFICANT CHANGE UP (ref 0–41)
AST SERPL-CCNC: 43 U/L — HIGH (ref 0–41)
AST SERPL-CCNC: 43 U/L — HIGH (ref 0–41)
AST SERPL-CCNC: 47 U/L — HIGH (ref 0–41)
AST SERPL-CCNC: 49 U/L — HIGH (ref 0–41)
AST SERPL-CCNC: 54 U/L — HIGH (ref 0–41)
AST SERPL-CCNC: 67 U/L — HIGH (ref 0–41)
AST SERPL-CCNC: 71 U/L
AST SERPL-CCNC: 74 U/L — HIGH (ref 0–41)
AST SERPL-CCNC: 77 U/L — HIGH (ref 0–41)
AST SERPL-CCNC: 89 U/L — HIGH (ref 0–41)
AST SERPL-CCNC: 93 U/L — HIGH (ref 0–41)
AST SERPL-CCNC: 94 U/L — HIGH (ref 0–41)
AST SERPL-CCNC: 970 U/L — HIGH (ref 0–41)
AST SERPL-CCNC: 970 U/L — HIGH (ref 0–41)
B-GLOBULIN 24H MFR UR ELPH: 15.4 % — SIGNIFICANT CHANGE UP
B-GLOBULIN SERPL ELPH-MCNC: SIGNIFICANT CHANGE UP G/DL (ref 0.5–1)
B-OH-BUTYR SERPL-SCNC: <0.2 MMOL/L — SIGNIFICANT CHANGE UP
B-OH-BUTYR SERPL-SCNC: <0.2 MMOL/L — SIGNIFICANT CHANGE UP
BACTERIA # UR AUTO: ABNORMAL
BACTERIA # UR AUTO: NEGATIVE /HPF — SIGNIFICANT CHANGE UP
BACTERIA # UR AUTO: NEGATIVE /HPF — SIGNIFICANT CHANGE UP
BACTERIA # UR AUTO: NEGATIVE — SIGNIFICANT CHANGE UP
BACTERIA # UR AUTO: NEGATIVE — SIGNIFICANT CHANGE UP
BASE EXCESS BLDA CALC-SCNC: -23.8 MMOL/L — LOW (ref -2–3)
BASE EXCESS BLDA CALC-SCNC: -23.8 MMOL/L — LOW (ref -2–3)
BASE EXCESS BLDA CALC-SCNC: 1.5 MMOL/L — SIGNIFICANT CHANGE UP (ref -2–3)
BASE EXCESS BLDA CALC-SCNC: 10.2 MMOL/L — HIGH (ref -2–3)
BASE EXCESS BLDA CALC-SCNC: 3.5 MMOL/L — HIGH (ref -2–3)
BASE EXCESS BLDA CALC-SCNC: 5.8 MMOL/L — HIGH (ref -2–3)
BASE EXCESS BLDMV CALC-SCNC: -4.4 MMOL/L — SIGNIFICANT CHANGE UP
BASE EXCESS BLDMV CALC-SCNC: 0.1 MMOL/L — SIGNIFICANT CHANGE UP
BASE EXCESS BLDMV CALC-SCNC: 0.9 MMOL/L — SIGNIFICANT CHANGE UP
BASE EXCESS BLDMV CALC-SCNC: 10.2 MMOL/L — SIGNIFICANT CHANGE UP
BASE EXCESS BLDMV CALC-SCNC: 13 MMOL/L — SIGNIFICANT CHANGE UP
BASE EXCESS BLDMV CALC-SCNC: 2.2 MMOL/L — SIGNIFICANT CHANGE UP
BASE EXCESS BLDMV CALC-SCNC: 3.2 MMOL/L — SIGNIFICANT CHANGE UP
BASE EXCESS BLDMV CALC-SCNC: 4 MMOL/L — SIGNIFICANT CHANGE UP
BASE EXCESS BLDV CALC-SCNC: -1.4 MMOL/L — SIGNIFICANT CHANGE UP (ref -2–3)
BASE EXCESS BLDV CALC-SCNC: 7.7 MMOL/L — HIGH (ref -2–3)
BASE EXCESS BLDV CALC-SCNC: 9 MMOL/L — HIGH (ref -2–3)
BASOPHILS # BLD AUTO: 0 K/UL — SIGNIFICANT CHANGE UP (ref 0–0.2)
BASOPHILS # BLD AUTO: 0.01 K/UL — SIGNIFICANT CHANGE UP (ref 0–0.2)
BASOPHILS # BLD AUTO: 0.02 K/UL — SIGNIFICANT CHANGE UP (ref 0–0.2)
BASOPHILS # BLD AUTO: 0.02 K/UL — SIGNIFICANT CHANGE UP (ref 0–0.2)
BASOPHILS # BLD AUTO: 0.03 K/UL — SIGNIFICANT CHANGE UP (ref 0–0.2)
BASOPHILS # BLD AUTO: 0.04 K/UL — SIGNIFICANT CHANGE UP (ref 0–0.2)
BASOPHILS # BLD AUTO: 0.05 K/UL — SIGNIFICANT CHANGE UP (ref 0–0.2)
BASOPHILS # BLD AUTO: 0.06 K/UL — SIGNIFICANT CHANGE UP (ref 0–0.2)
BASOPHILS # BLD AUTO: 0.07 K/UL — SIGNIFICANT CHANGE UP (ref 0–0.2)
BASOPHILS # BLD AUTO: 0.08 K/UL — SIGNIFICANT CHANGE UP (ref 0–0.2)
BASOPHILS # BLD AUTO: 0.09 K/UL — SIGNIFICANT CHANGE UP (ref 0–0.2)
BASOPHILS # BLD AUTO: 0.11 K/UL — SIGNIFICANT CHANGE UP (ref 0–0.2)
BASOPHILS # BLD AUTO: 0.11 K/UL — SIGNIFICANT CHANGE UP (ref 0–0.2)
BASOPHILS NFR BLD AUTO: 0 % — SIGNIFICANT CHANGE UP (ref 0–1)
BASOPHILS NFR BLD AUTO: 0.1 % — SIGNIFICANT CHANGE UP (ref 0–1)
BASOPHILS NFR BLD AUTO: 0.1 % — SIGNIFICANT CHANGE UP (ref 0–1)
BASOPHILS NFR BLD AUTO: 0.2 % — SIGNIFICANT CHANGE UP (ref 0–1)
BASOPHILS NFR BLD AUTO: 0.2 % — SIGNIFICANT CHANGE UP (ref 0–1)
BASOPHILS NFR BLD AUTO: 0.3 % — SIGNIFICANT CHANGE UP (ref 0–1)
BASOPHILS NFR BLD AUTO: 0.4 % — SIGNIFICANT CHANGE UP (ref 0–1)
BASOPHILS NFR BLD AUTO: 0.4 % — SIGNIFICANT CHANGE UP (ref 0–1)
BASOPHILS NFR BLD AUTO: 0.5 % — SIGNIFICANT CHANGE UP (ref 0–1)
BASOPHILS NFR BLD AUTO: 0.6 % — SIGNIFICANT CHANGE UP (ref 0–1)
BASOPHILS NFR BLD AUTO: 0.7 % — SIGNIFICANT CHANGE UP (ref 0–1)
BASOPHILS NFR BLD AUTO: 0.8 % — SIGNIFICANT CHANGE UP (ref 0–1)
BASOPHILS NFR BLD AUTO: 0.9 % — SIGNIFICANT CHANGE UP (ref 0–1)
BASOPHILS NFR BLD AUTO: 1 % — SIGNIFICANT CHANGE UP (ref 0–1)
BASOPHILS NFR BLD AUTO: 1.2 % — HIGH (ref 0–1)
BILIRUB SERPL-MCNC: 0.3 MG/DL — SIGNIFICANT CHANGE UP (ref 0.2–1.2)
BILIRUB SERPL-MCNC: 0.3 MG/DL — SIGNIFICANT CHANGE UP (ref 0.2–1.2)
BILIRUB SERPL-MCNC: 0.4 MG/DL
BILIRUB SERPL-MCNC: 0.6 MG/DL — SIGNIFICANT CHANGE UP (ref 0.2–1.2)
BILIRUB SERPL-MCNC: 0.7 MG/DL — SIGNIFICANT CHANGE UP (ref 0.2–1.2)
BILIRUB SERPL-MCNC: 0.8 MG/DL — SIGNIFICANT CHANGE UP (ref 0.2–1.2)
BILIRUB SERPL-MCNC: 0.9 MG/DL — SIGNIFICANT CHANGE UP (ref 0.2–1.2)
BILIRUB SERPL-MCNC: 0.9 MG/DL — SIGNIFICANT CHANGE UP (ref 0.2–1.2)
BILIRUB SERPL-MCNC: 1 MG/DL — SIGNIFICANT CHANGE UP (ref 0.2–1.2)
BILIRUB SERPL-MCNC: 1.1 MG/DL — SIGNIFICANT CHANGE UP (ref 0.2–1.2)
BILIRUB SERPL-MCNC: 1.2 MG/DL — SIGNIFICANT CHANGE UP (ref 0.2–1.2)
BILIRUB SERPL-MCNC: 1.3 MG/DL — HIGH (ref 0.2–1.2)
BILIRUB SERPL-MCNC: 1.4 MG/DL — HIGH (ref 0.2–1.2)
BILIRUB SERPL-MCNC: 1.5 MG/DL — HIGH (ref 0.2–1.2)
BILIRUB SERPL-MCNC: 1.6 MG/DL — HIGH (ref 0.2–1.2)
BILIRUB SERPL-MCNC: 1.7 MG/DL — HIGH (ref 0.2–1.2)
BILIRUB SERPL-MCNC: 2.1 MG/DL — HIGH (ref 0.2–1.2)
BILIRUB SERPL-MCNC: 2.3 MG/DL — HIGH (ref 0.2–1.2)
BILIRUB SERPL-MCNC: 2.4 MG/DL — HIGH (ref 0.2–1.2)
BILIRUB SERPL-MCNC: 2.4 MG/DL — HIGH (ref 0.2–1.2)
BILIRUB SERPL-MCNC: 2.6 MG/DL — HIGH (ref 0.2–1.2)
BILIRUB SERPL-MCNC: 2.8 MG/DL — HIGH (ref 0.2–1.2)
BILIRUB SERPL-MCNC: 2.9 MG/DL — HIGH (ref 0.2–1.2)
BILIRUB SERPL-MCNC: 3.1 MG/DL — HIGH (ref 0.2–1.2)
BILIRUB SERPL-MCNC: 3.5 MG/DL — HIGH (ref 0.2–1.2)
BILIRUB UR-MCNC: NEGATIVE — SIGNIFICANT CHANGE UP
BLD GP AB SCN SERPL QL: SIGNIFICANT CHANGE UP
BUN SERPL-MCNC: 21 MG/DL — HIGH (ref 10–20)
BUN SERPL-MCNC: 22 MG/DL — HIGH (ref 10–20)
BUN SERPL-MCNC: 23 MG/DL — HIGH (ref 10–20)
BUN SERPL-MCNC: 23 MG/DL — HIGH (ref 10–20)
BUN SERPL-MCNC: 24 MG/DL — HIGH (ref 10–20)
BUN SERPL-MCNC: 25 MG/DL — HIGH (ref 10–20)
BUN SERPL-MCNC: 26 MG/DL — HIGH (ref 10–20)
BUN SERPL-MCNC: 27 MG/DL — HIGH (ref 10–20)
BUN SERPL-MCNC: 28 MG/DL — HIGH (ref 10–20)
BUN SERPL-MCNC: 29 MG/DL — HIGH (ref 10–20)
BUN SERPL-MCNC: 30 MG/DL — HIGH (ref 10–20)
BUN SERPL-MCNC: 32 MG/DL — HIGH (ref 10–20)
BUN SERPL-MCNC: 33 MG/DL — HIGH (ref 10–20)
BUN SERPL-MCNC: 34 MG/DL — HIGH (ref 10–20)
BUN SERPL-MCNC: 35 MG/DL — HIGH (ref 10–20)
BUN SERPL-MCNC: 36 MG/DL
BUN SERPL-MCNC: 36 MG/DL — HIGH (ref 10–20)
BUN SERPL-MCNC: 36 MG/DL — HIGH (ref 10–20)
BUN SERPL-MCNC: 37 MG/DL — HIGH (ref 10–20)
BUN SERPL-MCNC: 39 MG/DL — HIGH (ref 10–20)
BUN SERPL-MCNC: 39 MG/DL — HIGH (ref 10–20)
BUN SERPL-MCNC: 40 MG/DL — HIGH (ref 10–20)
BUN SERPL-MCNC: 41 MG/DL — HIGH (ref 10–20)
BUN SERPL-MCNC: 41 MG/DL — HIGH (ref 10–20)
BUN SERPL-MCNC: 43 MG/DL — HIGH (ref 10–20)
BUN SERPL-MCNC: 43 MG/DL — HIGH (ref 10–20)
BUN SERPL-MCNC: 45 MG/DL — HIGH (ref 10–20)
BUN SERPL-MCNC: 46 MG/DL — HIGH (ref 10–20)
BUN SERPL-MCNC: 47 MG/DL — HIGH (ref 10–20)
BUN SERPL-MCNC: 48 MG/DL — HIGH (ref 10–20)
BUN SERPL-MCNC: 49 MG/DL — HIGH (ref 10–20)
BUN SERPL-MCNC: 51 MG/DL — HIGH (ref 10–20)
BUN SERPL-MCNC: 53 MG/DL — HIGH (ref 10–20)
BUN SERPL-MCNC: 54 MG/DL — HIGH (ref 10–20)
BUN SERPL-MCNC: 55 MG/DL — HIGH (ref 10–20)
BUN SERPL-MCNC: 55 MG/DL — HIGH (ref 10–20)
BUN SERPL-MCNC: 59 MG/DL — HIGH (ref 10–20)
BUN SERPL-MCNC: 61 MG/DL — CRITICAL HIGH (ref 10–20)
BUN SERPL-MCNC: 62 MG/DL — CRITICAL HIGH (ref 10–20)
BUN SERPL-MCNC: 65 MG/DL — CRITICAL HIGH (ref 10–20)
BUN SERPL-MCNC: 66 MG/DL — CRITICAL HIGH (ref 10–20)
BUN SERPL-MCNC: 68 MG/DL — CRITICAL HIGH (ref 10–20)
BUN SERPL-MCNC: 68 MG/DL — CRITICAL HIGH (ref 10–20)
BUN SERPL-MCNC: 69 MG/DL — CRITICAL HIGH (ref 10–20)
BUN SERPL-MCNC: 69 MG/DL — CRITICAL HIGH (ref 10–20)
BUN SERPL-MCNC: 70 MG/DL — CRITICAL HIGH (ref 10–20)
BUN SERPL-MCNC: 79 MG/DL — CRITICAL HIGH (ref 10–20)
BUN SERPL-MCNC: 79 MG/DL — CRITICAL HIGH (ref 10–20)
BURR CELLS BLD QL SMEAR: PRESENT — SIGNIFICANT CHANGE UP
BURR CELLS BLD QL SMEAR: SLIGHT — SIGNIFICANT CHANGE UP
C PEPTIDE SERPL-MCNC: 1.5 NG/ML — SIGNIFICANT CHANGE UP (ref 1.1–4.4)
C3 SERPL-MCNC: 114 MG/DL — SIGNIFICANT CHANGE UP (ref 81–157)
C3 SERPL-MCNC: 73 MG/DL — LOW (ref 81–157)
C3 SERPL-MCNC: 90 MG/DL — SIGNIFICANT CHANGE UP (ref 81–157)
C4 SERPL-MCNC: 11 MG/DL — LOW (ref 13–39)
C4 SERPL-MCNC: 11 MG/DL — LOW (ref 13–39)
C4 SERPL-MCNC: 16 MG/DL — SIGNIFICANT CHANGE UP (ref 13–39)
CA-I SERPL-SCNC: 1.21 MMOL/L — SIGNIFICANT CHANGE UP (ref 1.15–1.33)
CA-I SERPL-SCNC: 1.23 MMOL/L — SIGNIFICANT CHANGE UP (ref 1.15–1.33)
CALCIUM SERPL-MCNC: 8.1 MG/DL — LOW (ref 8.4–10.4)
CALCIUM SERPL-MCNC: 8.1 MG/DL — LOW (ref 8.4–10.4)
CALCIUM SERPL-MCNC: 8.1 MG/DL — LOW (ref 8.4–10.5)
CALCIUM SERPL-MCNC: 8.1 MG/DL — LOW (ref 8.4–10.5)
CALCIUM SERPL-MCNC: 8.2 MG/DL — LOW (ref 8.4–10.5)
CALCIUM SERPL-MCNC: 8.3 MG/DL — LOW (ref 8.4–10.5)
CALCIUM SERPL-MCNC: 8.4 MG/DL — SIGNIFICANT CHANGE UP (ref 8.4–10.4)
CALCIUM SERPL-MCNC: 8.4 MG/DL — SIGNIFICANT CHANGE UP (ref 8.4–10.5)
CALCIUM SERPL-MCNC: 8.5 MG/DL — SIGNIFICANT CHANGE UP (ref 8.4–10.4)
CALCIUM SERPL-MCNC: 8.5 MG/DL — SIGNIFICANT CHANGE UP (ref 8.4–10.4)
CALCIUM SERPL-MCNC: 8.5 MG/DL — SIGNIFICANT CHANGE UP (ref 8.4–10.5)
CALCIUM SERPL-MCNC: 8.6 MG/DL — SIGNIFICANT CHANGE UP (ref 8.4–10.4)
CALCIUM SERPL-MCNC: 8.6 MG/DL — SIGNIFICANT CHANGE UP (ref 8.4–10.5)
CALCIUM SERPL-MCNC: 8.7 MG/DL — SIGNIFICANT CHANGE UP (ref 8.4–10.4)
CALCIUM SERPL-MCNC: 8.7 MG/DL — SIGNIFICANT CHANGE UP (ref 8.4–10.5)
CALCIUM SERPL-MCNC: 8.8 MG/DL — SIGNIFICANT CHANGE UP (ref 8.4–10.5)
CALCIUM SERPL-MCNC: 8.9 MG/DL — SIGNIFICANT CHANGE UP (ref 8.4–10.4)
CALCIUM SERPL-MCNC: 8.9 MG/DL — SIGNIFICANT CHANGE UP (ref 8.4–10.5)
CALCIUM SERPL-MCNC: 9 MG/DL — SIGNIFICANT CHANGE UP (ref 8.4–10.4)
CALCIUM SERPL-MCNC: 9 MG/DL — SIGNIFICANT CHANGE UP (ref 8.4–10.5)
CALCIUM SERPL-MCNC: 9.1 MG/DL — SIGNIFICANT CHANGE UP (ref 8.4–10.5)
CALCIUM SERPL-MCNC: 9.2 MG/DL — SIGNIFICANT CHANGE UP (ref 8.4–10.4)
CALCIUM SERPL-MCNC: 9.2 MG/DL — SIGNIFICANT CHANGE UP (ref 8.4–10.5)
CALCIUM SERPL-MCNC: 9.3 MG/DL — SIGNIFICANT CHANGE UP (ref 8.4–10.5)
CALCIUM SERPL-MCNC: 9.3 MG/DL — SIGNIFICANT CHANGE UP (ref 8.4–10.5)
CALCIUM SERPL-MCNC: 9.4 MG/DL
CALCIUM SERPL-MCNC: 9.4 MG/DL — SIGNIFICANT CHANGE UP (ref 8.4–10.4)
CALCIUM SERPL-MCNC: 9.4 MG/DL — SIGNIFICANT CHANGE UP (ref 8.4–10.4)
CALCIUM SERPL-MCNC: 9.4 MG/DL — SIGNIFICANT CHANGE UP (ref 8.4–10.5)
CALCIUM SERPL-MCNC: 9.5 MG/DL — SIGNIFICANT CHANGE UP (ref 8.4–10.5)
CALCIUM SERPL-MCNC: 9.5 MG/DL — SIGNIFICANT CHANGE UP (ref 8.4–10.5)
CAST: 9 /LPF — HIGH (ref 0–4)
CAST: 9 /LPF — HIGH (ref 0–4)
CHLORIDE SERPL-SCNC: 100 MMOL/L — SIGNIFICANT CHANGE UP (ref 98–110)
CHLORIDE SERPL-SCNC: 101 MMOL/L — SIGNIFICANT CHANGE UP (ref 98–110)
CHLORIDE SERPL-SCNC: 102 MMOL/L — SIGNIFICANT CHANGE UP (ref 98–110)
CHLORIDE SERPL-SCNC: 104 MMOL/L — SIGNIFICANT CHANGE UP (ref 98–110)
CHLORIDE SERPL-SCNC: 105 MMOL/L — SIGNIFICANT CHANGE UP (ref 98–110)
CHLORIDE SERPL-SCNC: 108 MMOL/L — SIGNIFICANT CHANGE UP (ref 98–110)
CHLORIDE SERPL-SCNC: 109 MMOL/L — SIGNIFICANT CHANGE UP (ref 98–110)
CHLORIDE SERPL-SCNC: 92 MMOL/L — LOW (ref 98–110)
CHLORIDE SERPL-SCNC: 93 MMOL/L — LOW (ref 98–110)
CHLORIDE SERPL-SCNC: 94 MMOL/L — LOW (ref 98–110)
CHLORIDE SERPL-SCNC: 95 MMOL/L
CHLORIDE SERPL-SCNC: 95 MMOL/L — LOW (ref 98–110)
CHLORIDE SERPL-SCNC: 96 MMOL/L — LOW (ref 98–110)
CHLORIDE SERPL-SCNC: 97 MMOL/L — LOW (ref 98–110)
CHLORIDE SERPL-SCNC: 98 MMOL/L — SIGNIFICANT CHANGE UP (ref 98–110)
CHLORIDE SERPL-SCNC: 99 MMOL/L — SIGNIFICANT CHANGE UP (ref 98–110)
CHOLEST SERPL-MCNC: 123 MG/DL — SIGNIFICANT CHANGE UP
CHOLEST SERPL-MCNC: 85 MG/DL — SIGNIFICANT CHANGE UP
CK SERPL-CCNC: 73 U/L — SIGNIFICANT CHANGE UP (ref 0–225)
CO2 SERPL-SCNC: 21 MMOL/L — SIGNIFICANT CHANGE UP (ref 17–32)
CO2 SERPL-SCNC: 22 MMOL/L — SIGNIFICANT CHANGE UP (ref 17–32)
CO2 SERPL-SCNC: 23 MMOL/L — SIGNIFICANT CHANGE UP (ref 17–32)
CO2 SERPL-SCNC: 24 MMOL/L — SIGNIFICANT CHANGE UP (ref 17–32)
CO2 SERPL-SCNC: 25 MMOL/L — SIGNIFICANT CHANGE UP (ref 17–32)
CO2 SERPL-SCNC: 26 MMOL/L
CO2 SERPL-SCNC: 26 MMOL/L — SIGNIFICANT CHANGE UP (ref 17–32)
CO2 SERPL-SCNC: 27 MMOL/L — SIGNIFICANT CHANGE UP (ref 17–32)
CO2 SERPL-SCNC: 28 MMOL/L — SIGNIFICANT CHANGE UP (ref 17–32)
CO2 SERPL-SCNC: 29 MMOL/L — SIGNIFICANT CHANGE UP (ref 17–32)
CO2 SERPL-SCNC: 30 MMOL/L — SIGNIFICANT CHANGE UP (ref 17–32)
CO2 SERPL-SCNC: 31 MMOL/L — SIGNIFICANT CHANGE UP (ref 17–32)
CO2 SERPL-SCNC: 32 MMOL/L — SIGNIFICANT CHANGE UP (ref 17–32)
CO2 SERPL-SCNC: 33 MMOL/L — HIGH (ref 17–32)
CO2 SERPL-SCNC: 35 MMOL/L — HIGH (ref 17–32)
CO2 SERPL-SCNC: 36 MMOL/L — HIGH (ref 17–32)
CO2 SERPL-SCNC: 38 MMOL/L — HIGH (ref 17–32)
CO2 SERPL-SCNC: 38 MMOL/L — HIGH (ref 17–32)
CO2 SERPL-SCNC: 8 MMOL/L — CRITICAL LOW (ref 17–32)
CO2 SERPL-SCNC: 8 MMOL/L — CRITICAL LOW (ref 17–32)
COLOR SPEC: SIGNIFICANT CHANGE UP
COLOR SPEC: SIGNIFICANT CHANGE UP
COLOR SPEC: YELLOW — SIGNIFICANT CHANGE UP
CREAT ?TM UR-MCNC: 110 MG/DL — SIGNIFICANT CHANGE UP
CREAT SERPL-MCNC: 0.9 MG/DL — SIGNIFICANT CHANGE UP (ref 0.7–1.5)
CREAT SERPL-MCNC: 0.9 MG/DL — SIGNIFICANT CHANGE UP (ref 0.7–1.5)
CREAT SERPL-MCNC: 1 MG/DL — SIGNIFICANT CHANGE UP (ref 0.7–1.5)
CREAT SERPL-MCNC: 1.04 MG/DL
CREAT SERPL-MCNC: 1.1 MG/DL — SIGNIFICANT CHANGE UP (ref 0.7–1.5)
CREAT SERPL-MCNC: 1.2 MG/DL — SIGNIFICANT CHANGE UP (ref 0.7–1.5)
CREAT SERPL-MCNC: 1.3 MG/DL — SIGNIFICANT CHANGE UP (ref 0.7–1.5)
CREAT SERPL-MCNC: 1.4 MG/DL — SIGNIFICANT CHANGE UP (ref 0.7–1.5)
CREAT SERPL-MCNC: 1.5 MG/DL — SIGNIFICANT CHANGE UP (ref 0.7–1.5)
CREAT SERPL-MCNC: 1.6 MG/DL — HIGH (ref 0.7–1.5)
CREAT SERPL-MCNC: 1.7 MG/DL — HIGH (ref 0.7–1.5)
CREAT SERPL-MCNC: 1.8 MG/DL — HIGH (ref 0.7–1.5)
CREAT SERPL-MCNC: 1.8 MG/DL — HIGH (ref 0.7–1.5)
CREAT SERPL-MCNC: 1.9 MG/DL — HIGH (ref 0.7–1.5)
CREAT SERPL-MCNC: 2 MG/DL — HIGH (ref 0.7–1.5)
CREAT SERPL-MCNC: 2 MG/DL — HIGH (ref 0.7–1.5)
CREAT SERPL-MCNC: 2.2 MG/DL — HIGH (ref 0.7–1.5)
CREAT SERPL-MCNC: 2.5 MG/DL — HIGH (ref 0.7–1.5)
CREAT SERPL-MCNC: 2.6 MG/DL — HIGH (ref 0.7–1.5)
CREAT SERPL-MCNC: 2.9 MG/DL — HIGH (ref 0.7–1.5)
CREAT SERPL-MCNC: 3.2 MG/DL — HIGH (ref 0.7–1.5)
CREAT SERPL-MCNC: 3.4 MG/DL — HIGH (ref 0.7–1.5)
CREAT SERPL-MCNC: 3.5 MG/DL — HIGH (ref 0.7–1.5)
CREAT SERPL-MCNC: 3.5 MG/DL — HIGH (ref 0.7–1.5)
CREAT SERPL-MCNC: 3.6 MG/DL — HIGH (ref 0.7–1.5)
CRP SERPL-MCNC: 21.6 MG/L
CRP SERPL-MCNC: 32.1 MG/L — HIGH
CRP SERPL-MCNC: 55.5 MG/L — HIGH
CULTURE RESULTS: ABNORMAL
CULTURE RESULTS: ABNORMAL
CULTURE RESULTS: SIGNIFICANT CHANGE UP
D DIMER BLD IA.RAPID-MCNC: 437 NG/ML DDU — HIGH
DIFF PNL FLD: ABNORMAL
DIFF PNL FLD: NEGATIVE — SIGNIFICANT CHANGE UP
DIGOXIN SERPL-MCNC: 1.4 NG/ML — SIGNIFICANT CHANGE UP (ref 0.8–2)
DIGOXIN SERPL-MCNC: 1.5 NG/ML — SIGNIFICANT CHANGE UP (ref 0.8–2)
DIGOXIN SERPL-MCNC: 2.2 NG/ML — HIGH (ref 0.8–2)
DIGOXIN SERPL-MCNC: 2.5 NG/ML — HIGH (ref 0.8–2)
DSDNA AB FLD-ACNC: <0.2 AI — SIGNIFICANT CHANGE UP
DSDNA AB SER QL CLIF: NEGATIVE — SIGNIFICANT CHANGE UP
DSDNA AB SER QL CLIF: NEGATIVE — SIGNIFICANT CHANGE UP
EGFR: 18 ML/MIN/1.73M2 — LOW
EGFR: 19 ML/MIN/1.73M2 — LOW
EGFR: 21 ML/MIN/1.73M2 — LOW
EGFR: 23 ML/MIN/1.73M2 — LOW
EGFR: 27 ML/MIN/1.73M2 — LOW
EGFR: 28 ML/MIN/1.73M2 — LOW
EGFR: 32 ML/MIN/1.73M2 — LOW
EGFR: 36 ML/MIN/1.73M2 — LOW
EGFR: 36 ML/MIN/1.73M2 — LOW
EGFR: 39 ML/MIN/1.73M2 — LOW
EGFR: 41 ML/MIN/1.73M2 — LOW
EGFR: 41 ML/MIN/1.73M2 — LOW
EGFR: 44 ML/MIN/1.73M2 — LOW
EGFR: 47 ML/MIN/1.73M2 — LOW
EGFR: 47 ML/MIN/1.73M2 — LOW
EGFR: 48 ML/MIN/1.73M2 — LOW
EGFR: 51 ML/MIN/1.73M2 — LOW
EGFR: 56 ML/MIN/1.73M2 — LOW
EGFR: 61 ML/MIN/1.73M2 — SIGNIFICANT CHANGE UP
EGFR: 67 ML/MIN/1.73M2 — SIGNIFICANT CHANGE UP
EGFR: 74 ML/MIN/1.73M2 — SIGNIFICANT CHANGE UP
EGFR: 79 ML/MIN/1.73M2
EGFR: 84 ML/MIN/1.73M2 — SIGNIFICANT CHANGE UP
EGFR: 95 ML/MIN/1.73M2 — SIGNIFICANT CHANGE UP
EGFR: 95 ML/MIN/1.73M2 — SIGNIFICANT CHANGE UP
ELLIPTOCYTES BLD QL SMEAR: SLIGHT — SIGNIFICANT CHANGE UP
ENA SCL70 AB SER-ACNC: <0.2 AI — SIGNIFICANT CHANGE UP
ENA SS-A AB FLD IA-ACNC: <0.2 AI — SIGNIFICANT CHANGE UP
EOSINOPHIL # BLD AUTO: 0 K/UL — SIGNIFICANT CHANGE UP (ref 0–0.7)
EOSINOPHIL # BLD AUTO: 0.01 K/UL — SIGNIFICANT CHANGE UP (ref 0–0.7)
EOSINOPHIL # BLD AUTO: 0.03 K/UL — SIGNIFICANT CHANGE UP (ref 0–0.7)
EOSINOPHIL # BLD AUTO: 0.04 K/UL — SIGNIFICANT CHANGE UP (ref 0–0.7)
EOSINOPHIL # BLD AUTO: 0.05 K/UL — SIGNIFICANT CHANGE UP (ref 0–0.7)
EOSINOPHIL # BLD AUTO: 0.05 K/UL — SIGNIFICANT CHANGE UP (ref 0–0.7)
EOSINOPHIL # BLD AUTO: 0.07 K/UL — SIGNIFICANT CHANGE UP (ref 0–0.7)
EOSINOPHIL # BLD AUTO: 0.09 K/UL — SIGNIFICANT CHANGE UP (ref 0–0.7)
EOSINOPHIL # BLD AUTO: 0.09 K/UL — SIGNIFICANT CHANGE UP (ref 0–0.7)
EOSINOPHIL # BLD AUTO: 0.1 K/UL — SIGNIFICANT CHANGE UP (ref 0–0.7)
EOSINOPHIL # BLD AUTO: 0.11 K/UL — SIGNIFICANT CHANGE UP (ref 0–0.7)
EOSINOPHIL # BLD AUTO: 0.11 K/UL — SIGNIFICANT CHANGE UP (ref 0–0.7)
EOSINOPHIL # BLD AUTO: 0.12 K/UL — SIGNIFICANT CHANGE UP (ref 0–0.7)
EOSINOPHIL # BLD AUTO: 0.12 K/UL — SIGNIFICANT CHANGE UP (ref 0–0.7)
EOSINOPHIL # BLD AUTO: 0.13 K/UL — SIGNIFICANT CHANGE UP (ref 0–0.7)
EOSINOPHIL # BLD AUTO: 0.14 K/UL — SIGNIFICANT CHANGE UP (ref 0–0.7)
EOSINOPHIL # BLD AUTO: 0.15 K/UL — SIGNIFICANT CHANGE UP (ref 0–0.7)
EOSINOPHIL # BLD AUTO: 0.16 K/UL — SIGNIFICANT CHANGE UP (ref 0–0.7)
EOSINOPHIL # BLD AUTO: 0.18 K/UL — SIGNIFICANT CHANGE UP (ref 0–0.7)
EOSINOPHIL # BLD AUTO: 0.19 K/UL — SIGNIFICANT CHANGE UP (ref 0–0.7)
EOSINOPHIL # BLD AUTO: 0.19 K/UL — SIGNIFICANT CHANGE UP (ref 0–0.7)
EOSINOPHIL # BLD AUTO: 0.2 K/UL — SIGNIFICANT CHANGE UP (ref 0–0.7)
EOSINOPHIL # BLD AUTO: 0.2 K/UL — SIGNIFICANT CHANGE UP (ref 0–0.7)
EOSINOPHIL # BLD AUTO: 0.21 K/UL — SIGNIFICANT CHANGE UP (ref 0–0.7)
EOSINOPHIL # BLD AUTO: 0.23 K/UL — SIGNIFICANT CHANGE UP (ref 0–0.7)
EOSINOPHIL # BLD AUTO: 0.29 K/UL — SIGNIFICANT CHANGE UP (ref 0–0.7)
EOSINOPHIL # BLD AUTO: 0.31 K/UL — SIGNIFICANT CHANGE UP (ref 0–0.7)
EOSINOPHIL # BLD AUTO: 0.73 K/UL — HIGH (ref 0–0.7)
EOSINOPHIL # BLD AUTO: 0.73 K/UL — HIGH (ref 0–0.7)
EOSINOPHIL NFR BLD AUTO: 0 % — SIGNIFICANT CHANGE UP (ref 0–8)
EOSINOPHIL NFR BLD AUTO: 0.1 % — SIGNIFICANT CHANGE UP (ref 0–8)
EOSINOPHIL NFR BLD AUTO: 0.2 % — SIGNIFICANT CHANGE UP (ref 0–8)
EOSINOPHIL NFR BLD AUTO: 0.2 % — SIGNIFICANT CHANGE UP (ref 0–8)
EOSINOPHIL NFR BLD AUTO: 0.4 % — SIGNIFICANT CHANGE UP (ref 0–8)
EOSINOPHIL NFR BLD AUTO: 0.6 % — SIGNIFICANT CHANGE UP (ref 0–8)
EOSINOPHIL NFR BLD AUTO: 0.6 % — SIGNIFICANT CHANGE UP (ref 0–8)
EOSINOPHIL NFR BLD AUTO: 0.9 % — SIGNIFICANT CHANGE UP (ref 0–8)
EOSINOPHIL NFR BLD AUTO: 0.9 % — SIGNIFICANT CHANGE UP (ref 0–8)
EOSINOPHIL NFR BLD AUTO: 1.2 % — SIGNIFICANT CHANGE UP (ref 0–8)
EOSINOPHIL NFR BLD AUTO: 1.4 % — SIGNIFICANT CHANGE UP (ref 0–8)
EOSINOPHIL NFR BLD AUTO: 1.5 % — SIGNIFICANT CHANGE UP (ref 0–8)
EOSINOPHIL NFR BLD AUTO: 1.6 % — SIGNIFICANT CHANGE UP (ref 0–8)
EOSINOPHIL NFR BLD AUTO: 1.7 % — SIGNIFICANT CHANGE UP (ref 0–8)
EOSINOPHIL NFR BLD AUTO: 1.8 % — SIGNIFICANT CHANGE UP (ref 0–8)
EOSINOPHIL NFR BLD AUTO: 1.9 % — SIGNIFICANT CHANGE UP (ref 0–8)
EOSINOPHIL NFR BLD AUTO: 2 % — SIGNIFICANT CHANGE UP (ref 0–8)
EOSINOPHIL NFR BLD AUTO: 2.1 % — SIGNIFICANT CHANGE UP (ref 0–8)
EOSINOPHIL NFR BLD AUTO: 2.2 % — SIGNIFICANT CHANGE UP (ref 0–8)
EOSINOPHIL NFR BLD AUTO: 2.3 % — SIGNIFICANT CHANGE UP (ref 0–8)
EOSINOPHIL NFR BLD AUTO: 2.6 % — SIGNIFICANT CHANGE UP (ref 0–8)
EOSINOPHIL NFR BLD AUTO: 3.4 % — SIGNIFICANT CHANGE UP (ref 0–8)
EOSINOPHIL NFR BLD AUTO: 3.7 % — SIGNIFICANT CHANGE UP (ref 0–8)
EOSINOPHIL NFR BLD AUTO: 6.6 % — SIGNIFICANT CHANGE UP (ref 0–8)
EOSINOPHIL NFR BLD AUTO: 6.6 % — SIGNIFICANT CHANGE UP (ref 0–8)
EPI CELLS # UR: 2 /HPF — SIGNIFICANT CHANGE UP (ref 0–5)
EPI CELLS # UR: 3 /HPF — SIGNIFICANT CHANGE UP (ref 0–5)
EPI CELLS # UR: 6 /HPF — HIGH (ref 0–5)
ERYTHROCYTE [SEDIMENTATION RATE] IN BLOOD BY WESTERGREN METHOD: 17 MM/HR
ERYTHROCYTE [SEDIMENTATION RATE] IN BLOOD: 15 MM/HR — HIGH (ref 0–10)
ERYTHROCYTE [SEDIMENTATION RATE] IN BLOOD: 3 MM/HR — SIGNIFICANT CHANGE UP (ref 0–10)
ESTIMATED AVERAGE GLUCOSE: 163 MG/DL — HIGH (ref 68–114)
ESTIMATED AVERAGE GLUCOSE: 163 MG/DL — HIGH (ref 68–114)
ESTIMATED AVERAGE GLUCOSE: 203 MG/DL — HIGH (ref 68–114)
ESTIMATED AVERAGE GLUCOSE: 217 MG/DL — HIGH (ref 68–114)
ESTIMATED AVERAGE GLUCOSE: 235 MG/DL — HIGH (ref 68–114)
ETHANOL SERPL-MCNC: <10 MG/DL — SIGNIFICANT CHANGE UP
FERRITIN SERPL-MCNC: 223 NG/ML
FERRITIN SERPL-MCNC: 255 NG/ML
FERRITIN SERPL-MCNC: 56 NG/ML — SIGNIFICANT CHANGE UP (ref 30–400)
FERRITIN SERPL-MCNC: 78 NG/ML — SIGNIFICANT CHANGE UP (ref 30–400)
FLUAV AG NPH QL: SIGNIFICANT CHANGE UP
FLUBV AG NPH QL: SIGNIFICANT CHANGE UP
GAMMA GLOBULIN: SIGNIFICANT CHANGE UP G/DL (ref 0.6–1.6)
GAS PNL BLDA: SIGNIFICANT CHANGE UP
GAS PNL BLDMV: SIGNIFICANT CHANGE UP
GAS PNL BLDV: 131 MMOL/L — LOW (ref 136–145)
GAS PNL BLDV: 133 MMOL/L — LOW (ref 136–145)
GAS PNL BLDV: 134 MMOL/L — LOW (ref 136–145)
GAS PNL BLDV: SIGNIFICANT CHANGE UP
GGT SERPL-CCNC: 73 U/L — HIGH (ref 1–40)
GIANT PLATELETS BLD QL SMEAR: PRESENT — SIGNIFICANT CHANGE UP
GLUCOSE BLDC GLUCOMTR-MCNC: 102 MG/DL — HIGH (ref 70–99)
GLUCOSE BLDC GLUCOMTR-MCNC: 103 MG/DL — HIGH (ref 70–99)
GLUCOSE BLDC GLUCOMTR-MCNC: 105 MG/DL — HIGH (ref 70–99)
GLUCOSE BLDC GLUCOMTR-MCNC: 106 MG/DL — HIGH (ref 70–99)
GLUCOSE BLDC GLUCOMTR-MCNC: 108 MG/DL — HIGH (ref 70–99)
GLUCOSE BLDC GLUCOMTR-MCNC: 108 MG/DL — HIGH (ref 70–99)
GLUCOSE BLDC GLUCOMTR-MCNC: 109 MG/DL — HIGH (ref 70–99)
GLUCOSE BLDC GLUCOMTR-MCNC: 110 MG/DL — HIGH (ref 70–99)
GLUCOSE BLDC GLUCOMTR-MCNC: 110 MG/DL — HIGH (ref 70–99)
GLUCOSE BLDC GLUCOMTR-MCNC: 111 MG/DL — HIGH (ref 70–99)
GLUCOSE BLDC GLUCOMTR-MCNC: 111 MG/DL — HIGH (ref 70–99)
GLUCOSE BLDC GLUCOMTR-MCNC: 112 MG/DL — HIGH (ref 70–99)
GLUCOSE BLDC GLUCOMTR-MCNC: 113 MG/DL — HIGH (ref 70–99)
GLUCOSE BLDC GLUCOMTR-MCNC: 113 MG/DL — HIGH (ref 70–99)
GLUCOSE BLDC GLUCOMTR-MCNC: 114 MG/DL — HIGH (ref 70–99)
GLUCOSE BLDC GLUCOMTR-MCNC: 115 MG/DL — HIGH (ref 70–99)
GLUCOSE BLDC GLUCOMTR-MCNC: 118 MG/DL — HIGH (ref 70–99)
GLUCOSE BLDC GLUCOMTR-MCNC: 119 MG/DL — HIGH (ref 70–99)
GLUCOSE BLDC GLUCOMTR-MCNC: 119 MG/DL — HIGH (ref 70–99)
GLUCOSE BLDC GLUCOMTR-MCNC: 120 MG/DL — HIGH (ref 70–99)
GLUCOSE BLDC GLUCOMTR-MCNC: 120 MG/DL — HIGH (ref 70–99)
GLUCOSE BLDC GLUCOMTR-MCNC: 121 MG/DL — HIGH (ref 70–99)
GLUCOSE BLDC GLUCOMTR-MCNC: 121 MG/DL — HIGH (ref 70–99)
GLUCOSE BLDC GLUCOMTR-MCNC: 122 MG/DL — HIGH (ref 70–99)
GLUCOSE BLDC GLUCOMTR-MCNC: 122 MG/DL — HIGH (ref 70–99)
GLUCOSE BLDC GLUCOMTR-MCNC: 123 MG/DL — HIGH (ref 70–99)
GLUCOSE BLDC GLUCOMTR-MCNC: 123 MG/DL — HIGH (ref 70–99)
GLUCOSE BLDC GLUCOMTR-MCNC: 124 MG/DL — HIGH (ref 70–99)
GLUCOSE BLDC GLUCOMTR-MCNC: 125 MG/DL — HIGH (ref 70–99)
GLUCOSE BLDC GLUCOMTR-MCNC: 126 MG/DL — HIGH (ref 70–99)
GLUCOSE BLDC GLUCOMTR-MCNC: 127 MG/DL — HIGH (ref 70–99)
GLUCOSE BLDC GLUCOMTR-MCNC: 128 MG/DL — HIGH (ref 70–99)
GLUCOSE BLDC GLUCOMTR-MCNC: 130 MG/DL — HIGH (ref 70–99)
GLUCOSE BLDC GLUCOMTR-MCNC: 130 MG/DL — HIGH (ref 70–99)
GLUCOSE BLDC GLUCOMTR-MCNC: 131 MG/DL — HIGH (ref 70–99)
GLUCOSE BLDC GLUCOMTR-MCNC: 131 MG/DL — HIGH (ref 70–99)
GLUCOSE BLDC GLUCOMTR-MCNC: 132 MG/DL — HIGH (ref 70–99)
GLUCOSE BLDC GLUCOMTR-MCNC: 132 MG/DL — HIGH (ref 70–99)
GLUCOSE BLDC GLUCOMTR-MCNC: 133 MG/DL — HIGH (ref 70–99)
GLUCOSE BLDC GLUCOMTR-MCNC: 134 MG/DL — HIGH (ref 70–99)
GLUCOSE BLDC GLUCOMTR-MCNC: 135 MG/DL — HIGH (ref 70–99)
GLUCOSE BLDC GLUCOMTR-MCNC: 136 MG/DL — HIGH (ref 70–99)
GLUCOSE BLDC GLUCOMTR-MCNC: 137 MG/DL — HIGH (ref 70–99)
GLUCOSE BLDC GLUCOMTR-MCNC: 137 MG/DL — HIGH (ref 70–99)
GLUCOSE BLDC GLUCOMTR-MCNC: 138 MG/DL — HIGH (ref 70–99)
GLUCOSE BLDC GLUCOMTR-MCNC: 139 MG/DL — HIGH (ref 70–99)
GLUCOSE BLDC GLUCOMTR-MCNC: 140 MG/DL — HIGH (ref 70–99)
GLUCOSE BLDC GLUCOMTR-MCNC: 141 MG/DL — HIGH (ref 70–99)
GLUCOSE BLDC GLUCOMTR-MCNC: 142 MG/DL — HIGH (ref 70–99)
GLUCOSE BLDC GLUCOMTR-MCNC: 142 MG/DL — HIGH (ref 70–99)
GLUCOSE BLDC GLUCOMTR-MCNC: 143 MG/DL — HIGH (ref 70–99)
GLUCOSE BLDC GLUCOMTR-MCNC: 144 MG/DL — HIGH (ref 70–99)
GLUCOSE BLDC GLUCOMTR-MCNC: 146 MG/DL — HIGH (ref 70–99)
GLUCOSE BLDC GLUCOMTR-MCNC: 147 MG/DL — HIGH (ref 70–99)
GLUCOSE BLDC GLUCOMTR-MCNC: 148 MG/DL — HIGH (ref 70–99)
GLUCOSE BLDC GLUCOMTR-MCNC: 149 MG/DL — HIGH (ref 70–99)
GLUCOSE BLDC GLUCOMTR-MCNC: 149 MG/DL — HIGH (ref 70–99)
GLUCOSE BLDC GLUCOMTR-MCNC: 150 MG/DL — HIGH (ref 70–99)
GLUCOSE BLDC GLUCOMTR-MCNC: 151 MG/DL — HIGH (ref 70–99)
GLUCOSE BLDC GLUCOMTR-MCNC: 151 MG/DL — HIGH (ref 70–99)
GLUCOSE BLDC GLUCOMTR-MCNC: 152 MG/DL — HIGH (ref 70–99)
GLUCOSE BLDC GLUCOMTR-MCNC: 153 MG/DL — HIGH (ref 70–99)
GLUCOSE BLDC GLUCOMTR-MCNC: 156 MG/DL — HIGH (ref 70–99)
GLUCOSE BLDC GLUCOMTR-MCNC: 158 MG/DL — HIGH (ref 70–99)
GLUCOSE BLDC GLUCOMTR-MCNC: 158 MG/DL — HIGH (ref 70–99)
GLUCOSE BLDC GLUCOMTR-MCNC: 159 MG/DL — HIGH (ref 70–99)
GLUCOSE BLDC GLUCOMTR-MCNC: 160 MG/DL — HIGH (ref 70–99)
GLUCOSE BLDC GLUCOMTR-MCNC: 161 MG/DL — HIGH (ref 70–99)
GLUCOSE BLDC GLUCOMTR-MCNC: 161 MG/DL — HIGH (ref 70–99)
GLUCOSE BLDC GLUCOMTR-MCNC: 163 MG/DL — HIGH (ref 70–99)
GLUCOSE BLDC GLUCOMTR-MCNC: 163 MG/DL — HIGH (ref 70–99)
GLUCOSE BLDC GLUCOMTR-MCNC: 164 MG/DL — HIGH (ref 70–99)
GLUCOSE BLDC GLUCOMTR-MCNC: 165 MG/DL — HIGH (ref 70–99)
GLUCOSE BLDC GLUCOMTR-MCNC: 166 MG/DL — HIGH (ref 70–99)
GLUCOSE BLDC GLUCOMTR-MCNC: 167 MG/DL — HIGH (ref 70–99)
GLUCOSE BLDC GLUCOMTR-MCNC: 169 MG/DL — HIGH (ref 70–99)
GLUCOSE BLDC GLUCOMTR-MCNC: 169 MG/DL — HIGH (ref 70–99)
GLUCOSE BLDC GLUCOMTR-MCNC: 173 MG/DL — HIGH (ref 70–99)
GLUCOSE BLDC GLUCOMTR-MCNC: 174 MG/DL — HIGH (ref 70–99)
GLUCOSE BLDC GLUCOMTR-MCNC: 177 MG/DL — HIGH (ref 70–99)
GLUCOSE BLDC GLUCOMTR-MCNC: 178 MG/DL — HIGH (ref 70–99)
GLUCOSE BLDC GLUCOMTR-MCNC: 178 MG/DL — HIGH (ref 70–99)
GLUCOSE BLDC GLUCOMTR-MCNC: 180 MG/DL — HIGH (ref 70–99)
GLUCOSE BLDC GLUCOMTR-MCNC: 181 MG/DL — HIGH (ref 70–99)
GLUCOSE BLDC GLUCOMTR-MCNC: 183 MG/DL — HIGH (ref 70–99)
GLUCOSE BLDC GLUCOMTR-MCNC: 187 MG/DL — HIGH (ref 70–99)
GLUCOSE BLDC GLUCOMTR-MCNC: 187 MG/DL — HIGH (ref 70–99)
GLUCOSE BLDC GLUCOMTR-MCNC: 188 MG/DL — HIGH (ref 70–99)
GLUCOSE BLDC GLUCOMTR-MCNC: 189 MG/DL — HIGH (ref 70–99)
GLUCOSE BLDC GLUCOMTR-MCNC: 190 MG/DL — HIGH (ref 70–99)
GLUCOSE BLDC GLUCOMTR-MCNC: 192 MG/DL — HIGH (ref 70–99)
GLUCOSE BLDC GLUCOMTR-MCNC: 192 MG/DL — HIGH (ref 70–99)
GLUCOSE BLDC GLUCOMTR-MCNC: 193 MG/DL — HIGH (ref 70–99)
GLUCOSE BLDC GLUCOMTR-MCNC: 194 MG/DL — HIGH (ref 70–99)
GLUCOSE BLDC GLUCOMTR-MCNC: 194 MG/DL — HIGH (ref 70–99)
GLUCOSE BLDC GLUCOMTR-MCNC: 195 MG/DL — HIGH (ref 70–99)
GLUCOSE BLDC GLUCOMTR-MCNC: 196 MG/DL — HIGH (ref 70–99)
GLUCOSE BLDC GLUCOMTR-MCNC: 196 MG/DL — HIGH (ref 70–99)
GLUCOSE BLDC GLUCOMTR-MCNC: 197 MG/DL — HIGH (ref 70–99)
GLUCOSE BLDC GLUCOMTR-MCNC: 198 MG/DL — HIGH (ref 70–99)
GLUCOSE BLDC GLUCOMTR-MCNC: 198 MG/DL — HIGH (ref 70–99)
GLUCOSE BLDC GLUCOMTR-MCNC: 199 MG/DL — HIGH (ref 70–99)
GLUCOSE BLDC GLUCOMTR-MCNC: 201 MG/DL — HIGH (ref 70–99)
GLUCOSE BLDC GLUCOMTR-MCNC: 201 MG/DL — HIGH (ref 70–99)
GLUCOSE BLDC GLUCOMTR-MCNC: 202 MG/DL — HIGH (ref 70–99)
GLUCOSE BLDC GLUCOMTR-MCNC: 203 MG/DL — HIGH (ref 70–99)
GLUCOSE BLDC GLUCOMTR-MCNC: 205 MG/DL — HIGH (ref 70–99)
GLUCOSE BLDC GLUCOMTR-MCNC: 205 MG/DL — HIGH (ref 70–99)
GLUCOSE BLDC GLUCOMTR-MCNC: 207 MG/DL — HIGH (ref 70–99)
GLUCOSE BLDC GLUCOMTR-MCNC: 207 MG/DL — HIGH (ref 70–99)
GLUCOSE BLDC GLUCOMTR-MCNC: 208 MG/DL — HIGH (ref 70–99)
GLUCOSE BLDC GLUCOMTR-MCNC: 208 MG/DL — HIGH (ref 70–99)
GLUCOSE BLDC GLUCOMTR-MCNC: 210 MG/DL — HIGH (ref 70–99)
GLUCOSE BLDC GLUCOMTR-MCNC: 212 MG/DL — HIGH (ref 70–99)
GLUCOSE BLDC GLUCOMTR-MCNC: 213 MG/DL — HIGH (ref 70–99)
GLUCOSE BLDC GLUCOMTR-MCNC: 214 MG/DL — HIGH (ref 70–99)
GLUCOSE BLDC GLUCOMTR-MCNC: 216 MG/DL — HIGH (ref 70–99)
GLUCOSE BLDC GLUCOMTR-MCNC: 218 MG/DL — HIGH (ref 70–99)
GLUCOSE BLDC GLUCOMTR-MCNC: 220 MG/DL — HIGH (ref 70–99)
GLUCOSE BLDC GLUCOMTR-MCNC: 221 MG/DL — HIGH (ref 70–99)
GLUCOSE BLDC GLUCOMTR-MCNC: 223 MG/DL — HIGH (ref 70–99)
GLUCOSE BLDC GLUCOMTR-MCNC: 224 MG/DL — HIGH (ref 70–99)
GLUCOSE BLDC GLUCOMTR-MCNC: 225 MG/DL — HIGH (ref 70–99)
GLUCOSE BLDC GLUCOMTR-MCNC: 227 MG/DL — HIGH (ref 70–99)
GLUCOSE BLDC GLUCOMTR-MCNC: 230 MG/DL — HIGH (ref 70–99)
GLUCOSE BLDC GLUCOMTR-MCNC: 232 MG/DL — HIGH (ref 70–99)
GLUCOSE BLDC GLUCOMTR-MCNC: 233 MG/DL — HIGH (ref 70–99)
GLUCOSE BLDC GLUCOMTR-MCNC: 234 MG/DL — HIGH (ref 70–99)
GLUCOSE BLDC GLUCOMTR-MCNC: 235 MG/DL — HIGH (ref 70–99)
GLUCOSE BLDC GLUCOMTR-MCNC: 239 MG/DL — HIGH (ref 70–99)
GLUCOSE BLDC GLUCOMTR-MCNC: 240 MG/DL — HIGH (ref 70–99)
GLUCOSE BLDC GLUCOMTR-MCNC: 246 MG/DL — HIGH (ref 70–99)
GLUCOSE BLDC GLUCOMTR-MCNC: 247 MG/DL — HIGH (ref 70–99)
GLUCOSE BLDC GLUCOMTR-MCNC: 248 MG/DL — HIGH (ref 70–99)
GLUCOSE BLDC GLUCOMTR-MCNC: 256 MG/DL — HIGH (ref 70–99)
GLUCOSE BLDC GLUCOMTR-MCNC: 264 MG/DL — HIGH (ref 70–99)
GLUCOSE BLDC GLUCOMTR-MCNC: 269 MG/DL — HIGH (ref 70–99)
GLUCOSE BLDC GLUCOMTR-MCNC: 269 MG/DL — HIGH (ref 70–99)
GLUCOSE BLDC GLUCOMTR-MCNC: 273 MG/DL — HIGH (ref 70–99)
GLUCOSE BLDC GLUCOMTR-MCNC: 281 MG/DL — HIGH (ref 70–99)
GLUCOSE BLDC GLUCOMTR-MCNC: 292 MG/DL — HIGH (ref 70–99)
GLUCOSE BLDC GLUCOMTR-MCNC: 292 MG/DL — HIGH (ref 70–99)
GLUCOSE BLDC GLUCOMTR-MCNC: 295 MG/DL — HIGH (ref 70–99)
GLUCOSE BLDC GLUCOMTR-MCNC: 302 MG/DL — HIGH (ref 70–99)
GLUCOSE BLDC GLUCOMTR-MCNC: 302 MG/DL — HIGH (ref 70–99)
GLUCOSE BLDC GLUCOMTR-MCNC: 31 MG/DL — CRITICAL LOW (ref 70–99)
GLUCOSE BLDC GLUCOMTR-MCNC: 336 MG/DL — HIGH (ref 70–99)
GLUCOSE BLDC GLUCOMTR-MCNC: 336 MG/DL — HIGH (ref 70–99)
GLUCOSE BLDC GLUCOMTR-MCNC: 344 MG/DL — HIGH (ref 70–99)
GLUCOSE BLDC GLUCOMTR-MCNC: 344 MG/DL — HIGH (ref 70–99)
GLUCOSE BLDC GLUCOMTR-MCNC: 36 MG/DL — CRITICAL LOW (ref 70–99)
GLUCOSE BLDC GLUCOMTR-MCNC: 377 MG/DL — HIGH (ref 70–99)
GLUCOSE BLDC GLUCOMTR-MCNC: 377 MG/DL — HIGH (ref 70–99)
GLUCOSE BLDC GLUCOMTR-MCNC: 39 MG/DL — CRITICAL LOW (ref 70–99)
GLUCOSE BLDC GLUCOMTR-MCNC: 39 MG/DL — CRITICAL LOW (ref 70–99)
GLUCOSE BLDC GLUCOMTR-MCNC: 40 MG/DL — CRITICAL LOW (ref 70–99)
GLUCOSE BLDC GLUCOMTR-MCNC: 51 MG/DL — CRITICAL LOW (ref 70–99)
GLUCOSE BLDC GLUCOMTR-MCNC: 52 MG/DL — CRITICAL LOW (ref 70–99)
GLUCOSE BLDC GLUCOMTR-MCNC: 54 MG/DL — CRITICAL LOW (ref 70–99)
GLUCOSE BLDC GLUCOMTR-MCNC: 57 MG/DL — LOW (ref 70–99)
GLUCOSE BLDC GLUCOMTR-MCNC: 59 MG/DL — LOW (ref 70–99)
GLUCOSE BLDC GLUCOMTR-MCNC: 62 MG/DL — LOW (ref 70–99)
GLUCOSE BLDC GLUCOMTR-MCNC: 66 MG/DL — LOW (ref 70–99)
GLUCOSE BLDC GLUCOMTR-MCNC: 66 MG/DL — LOW (ref 70–99)
GLUCOSE BLDC GLUCOMTR-MCNC: 68 MG/DL — LOW (ref 70–99)
GLUCOSE BLDC GLUCOMTR-MCNC: 68 MG/DL — LOW (ref 70–99)
GLUCOSE BLDC GLUCOMTR-MCNC: 69 MG/DL — LOW (ref 70–99)
GLUCOSE BLDC GLUCOMTR-MCNC: 70 MG/DL — SIGNIFICANT CHANGE UP (ref 70–99)
GLUCOSE BLDC GLUCOMTR-MCNC: 70 MG/DL — SIGNIFICANT CHANGE UP (ref 70–99)
GLUCOSE BLDC GLUCOMTR-MCNC: 72 MG/DL — SIGNIFICANT CHANGE UP (ref 70–99)
GLUCOSE BLDC GLUCOMTR-MCNC: 73 MG/DL — SIGNIFICANT CHANGE UP (ref 70–99)
GLUCOSE BLDC GLUCOMTR-MCNC: 74 MG/DL — SIGNIFICANT CHANGE UP (ref 70–99)
GLUCOSE BLDC GLUCOMTR-MCNC: 75 MG/DL — SIGNIFICANT CHANGE UP (ref 70–99)
GLUCOSE BLDC GLUCOMTR-MCNC: 75 MG/DL — SIGNIFICANT CHANGE UP (ref 70–99)
GLUCOSE BLDC GLUCOMTR-MCNC: 77 MG/DL — SIGNIFICANT CHANGE UP (ref 70–99)
GLUCOSE BLDC GLUCOMTR-MCNC: 77 MG/DL — SIGNIFICANT CHANGE UP (ref 70–99)
GLUCOSE BLDC GLUCOMTR-MCNC: 82 MG/DL — SIGNIFICANT CHANGE UP (ref 70–99)
GLUCOSE BLDC GLUCOMTR-MCNC: 83 MG/DL — SIGNIFICANT CHANGE UP (ref 70–99)
GLUCOSE BLDC GLUCOMTR-MCNC: 83 MG/DL — SIGNIFICANT CHANGE UP (ref 70–99)
GLUCOSE BLDC GLUCOMTR-MCNC: 84 MG/DL — SIGNIFICANT CHANGE UP (ref 70–99)
GLUCOSE BLDC GLUCOMTR-MCNC: 84 MG/DL — SIGNIFICANT CHANGE UP (ref 70–99)
GLUCOSE BLDC GLUCOMTR-MCNC: 85 MG/DL — SIGNIFICANT CHANGE UP (ref 70–99)
GLUCOSE BLDC GLUCOMTR-MCNC: 85 MG/DL — SIGNIFICANT CHANGE UP (ref 70–99)
GLUCOSE BLDC GLUCOMTR-MCNC: 86 MG/DL — SIGNIFICANT CHANGE UP (ref 70–99)
GLUCOSE BLDC GLUCOMTR-MCNC: 87 MG/DL — SIGNIFICANT CHANGE UP (ref 70–99)
GLUCOSE BLDC GLUCOMTR-MCNC: 88 MG/DL — SIGNIFICANT CHANGE UP (ref 70–99)
GLUCOSE BLDC GLUCOMTR-MCNC: 89 MG/DL — SIGNIFICANT CHANGE UP (ref 70–99)
GLUCOSE BLDC GLUCOMTR-MCNC: 90 MG/DL — SIGNIFICANT CHANGE UP (ref 70–99)
GLUCOSE BLDC GLUCOMTR-MCNC: 91 MG/DL — SIGNIFICANT CHANGE UP (ref 70–99)
GLUCOSE BLDC GLUCOMTR-MCNC: 92 MG/DL — SIGNIFICANT CHANGE UP (ref 70–99)
GLUCOSE BLDC GLUCOMTR-MCNC: 93 MG/DL — SIGNIFICANT CHANGE UP (ref 70–99)
GLUCOSE BLDC GLUCOMTR-MCNC: 93 MG/DL — SIGNIFICANT CHANGE UP (ref 70–99)
GLUCOSE BLDC GLUCOMTR-MCNC: 94 MG/DL — SIGNIFICANT CHANGE UP (ref 70–99)
GLUCOSE BLDC GLUCOMTR-MCNC: 95 MG/DL — SIGNIFICANT CHANGE UP (ref 70–99)
GLUCOSE BLDC GLUCOMTR-MCNC: 96 MG/DL — SIGNIFICANT CHANGE UP (ref 70–99)
GLUCOSE BLDC GLUCOMTR-MCNC: 99 MG/DL — SIGNIFICANT CHANGE UP (ref 70–99)
GLUCOSE SERPL-MCNC: 101 MG/DL — HIGH (ref 70–99)
GLUCOSE SERPL-MCNC: 102 MG/DL — HIGH (ref 70–99)
GLUCOSE SERPL-MCNC: 104 MG/DL — HIGH (ref 70–99)
GLUCOSE SERPL-MCNC: 109 MG/DL — HIGH (ref 70–99)
GLUCOSE SERPL-MCNC: 110 MG/DL — HIGH (ref 70–99)
GLUCOSE SERPL-MCNC: 112 MG/DL — HIGH (ref 70–99)
GLUCOSE SERPL-MCNC: 113 MG/DL — HIGH (ref 70–99)
GLUCOSE SERPL-MCNC: 115 MG/DL — HIGH (ref 70–99)
GLUCOSE SERPL-MCNC: 115 MG/DL — HIGH (ref 70–99)
GLUCOSE SERPL-MCNC: 116 MG/DL — HIGH (ref 70–99)
GLUCOSE SERPL-MCNC: 116 MG/DL — HIGH (ref 70–99)
GLUCOSE SERPL-MCNC: 119 MG/DL — HIGH (ref 70–99)
GLUCOSE SERPL-MCNC: 121 MG/DL — HIGH (ref 70–99)
GLUCOSE SERPL-MCNC: 124 MG/DL — HIGH (ref 70–99)
GLUCOSE SERPL-MCNC: 124 MG/DL — HIGH (ref 70–99)
GLUCOSE SERPL-MCNC: 129 MG/DL — HIGH (ref 70–99)
GLUCOSE SERPL-MCNC: 131 MG/DL — HIGH (ref 70–99)
GLUCOSE SERPL-MCNC: 133 MG/DL — HIGH (ref 70–99)
GLUCOSE SERPL-MCNC: 136 MG/DL
GLUCOSE SERPL-MCNC: 141 MG/DL — HIGH (ref 70–99)
GLUCOSE SERPL-MCNC: 141 MG/DL — HIGH (ref 70–99)
GLUCOSE SERPL-MCNC: 143 MG/DL — HIGH (ref 70–99)
GLUCOSE SERPL-MCNC: 143 MG/DL — HIGH (ref 70–99)
GLUCOSE SERPL-MCNC: 150 MG/DL — HIGH (ref 70–99)
GLUCOSE SERPL-MCNC: 150 MG/DL — HIGH (ref 70–99)
GLUCOSE SERPL-MCNC: 159 MG/DL — HIGH (ref 70–99)
GLUCOSE SERPL-MCNC: 159 MG/DL — HIGH (ref 70–99)
GLUCOSE SERPL-MCNC: 171 MG/DL — HIGH (ref 70–99)
GLUCOSE SERPL-MCNC: 174 MG/DL — HIGH (ref 70–99)
GLUCOSE SERPL-MCNC: 176 MG/DL — HIGH (ref 70–99)
GLUCOSE SERPL-MCNC: 177 MG/DL — HIGH (ref 70–99)
GLUCOSE SERPL-MCNC: 179 MG/DL — HIGH (ref 70–99)
GLUCOSE SERPL-MCNC: 180 MG/DL — HIGH (ref 70–99)
GLUCOSE SERPL-MCNC: 183 MG/DL — HIGH (ref 70–99)
GLUCOSE SERPL-MCNC: 188 MG/DL — HIGH (ref 70–99)
GLUCOSE SERPL-MCNC: 193 MG/DL — HIGH (ref 70–99)
GLUCOSE SERPL-MCNC: 194 MG/DL — HIGH (ref 70–99)
GLUCOSE SERPL-MCNC: 197 MG/DL — HIGH (ref 70–99)
GLUCOSE SERPL-MCNC: 204 MG/DL — HIGH (ref 70–99)
GLUCOSE SERPL-MCNC: 206 MG/DL — HIGH (ref 70–99)
GLUCOSE SERPL-MCNC: 208 MG/DL — HIGH (ref 70–99)
GLUCOSE SERPL-MCNC: 209 MG/DL — HIGH (ref 70–99)
GLUCOSE SERPL-MCNC: 210 MG/DL — HIGH (ref 70–99)
GLUCOSE SERPL-MCNC: 212 MG/DL — HIGH (ref 70–99)
GLUCOSE SERPL-MCNC: 212 MG/DL — HIGH (ref 70–99)
GLUCOSE SERPL-MCNC: 215 MG/DL — HIGH (ref 70–99)
GLUCOSE SERPL-MCNC: 218 MG/DL — HIGH (ref 70–99)
GLUCOSE SERPL-MCNC: 219 MG/DL — HIGH (ref 70–99)
GLUCOSE SERPL-MCNC: 247 MG/DL — HIGH (ref 70–99)
GLUCOSE SERPL-MCNC: 249 MG/DL — HIGH (ref 70–99)
GLUCOSE SERPL-MCNC: 257 MG/DL — HIGH (ref 70–99)
GLUCOSE SERPL-MCNC: 261 MG/DL — HIGH (ref 70–99)
GLUCOSE SERPL-MCNC: 261 MG/DL — HIGH (ref 70–99)
GLUCOSE SERPL-MCNC: 309 MG/DL — HIGH (ref 70–99)
GLUCOSE SERPL-MCNC: 309 MG/DL — HIGH (ref 70–99)
GLUCOSE SERPL-MCNC: 349 MG/DL — HIGH (ref 70–99)
GLUCOSE SERPL-MCNC: 356 MG/DL — HIGH (ref 70–99)
GLUCOSE SERPL-MCNC: 356 MG/DL — HIGH (ref 70–99)
GLUCOSE SERPL-MCNC: 359 MG/DL — HIGH (ref 70–99)
GLUCOSE SERPL-MCNC: 359 MG/DL — HIGH (ref 70–99)
GLUCOSE SERPL-MCNC: 42 MG/DL — CRITICAL LOW (ref 70–99)
GLUCOSE SERPL-MCNC: 51 MG/DL — CRITICAL LOW (ref 70–99)
GLUCOSE SERPL-MCNC: 57 MG/DL — LOW (ref 70–99)
GLUCOSE SERPL-MCNC: 58 MG/DL — LOW (ref 70–99)
GLUCOSE SERPL-MCNC: 63 MG/DL — LOW (ref 70–99)
GLUCOSE SERPL-MCNC: 69 MG/DL — LOW (ref 70–99)
GLUCOSE SERPL-MCNC: 70 MG/DL — SIGNIFICANT CHANGE UP (ref 70–99)
GLUCOSE SERPL-MCNC: 71 MG/DL — SIGNIFICANT CHANGE UP (ref 70–99)
GLUCOSE SERPL-MCNC: 74 MG/DL — SIGNIFICANT CHANGE UP (ref 70–99)
GLUCOSE SERPL-MCNC: 74 MG/DL — SIGNIFICANT CHANGE UP (ref 70–99)
GLUCOSE SERPL-MCNC: 77 MG/DL — SIGNIFICANT CHANGE UP (ref 70–99)
GLUCOSE SERPL-MCNC: 81 MG/DL — SIGNIFICANT CHANGE UP (ref 70–99)
GLUCOSE SERPL-MCNC: 86 MG/DL — SIGNIFICANT CHANGE UP (ref 70–99)
GLUCOSE SERPL-MCNC: 87 MG/DL — SIGNIFICANT CHANGE UP (ref 70–99)
GLUCOSE SERPL-MCNC: 88 MG/DL — SIGNIFICANT CHANGE UP (ref 70–99)
GLUCOSE SERPL-MCNC: 94 MG/DL — SIGNIFICANT CHANGE UP (ref 70–99)
GLUCOSE SERPL-MCNC: 96 MG/DL — SIGNIFICANT CHANGE UP (ref 70–99)
GLUCOSE SERPL-MCNC: 98 MG/DL — SIGNIFICANT CHANGE UP (ref 70–99)
GLUCOSE UR QL: 100 MG/DL
GLUCOSE UR QL: 100 MG/DL
GLUCOSE UR QL: NEGATIVE — SIGNIFICANT CHANGE UP
GRAM STN FLD: ABNORMAL
HBV CORE IGG+IGM SER QL: NONREACTIVE
HBV CORE IGM SER QL: NONREACTIVE
HBV SURFACE AB SER QL: REACTIVE
HBV SURFACE AG SER QL: NONREACTIVE
HCO3 BLDA-SCNC: 28 MMOL/L — SIGNIFICANT CHANGE UP (ref 21–28)
HCO3 BLDA-SCNC: 28 MMOL/L — SIGNIFICANT CHANGE UP (ref 21–28)
HCO3 BLDA-SCNC: 30 MMOL/L — HIGH (ref 21–28)
HCO3 BLDA-SCNC: 36 MMOL/L — HIGH (ref 21–28)
HCO3 BLDA-SCNC: 8 MMOL/L — CRITICAL LOW (ref 21–28)
HCO3 BLDA-SCNC: 8 MMOL/L — CRITICAL LOW (ref 21–28)
HCO3 BLDMV-SCNC: 19 MMOL/L — SIGNIFICANT CHANGE UP
HCO3 BLDMV-SCNC: 26 MMOL/L — SIGNIFICANT CHANGE UP
HCO3 BLDMV-SCNC: 29 MMOL/L — SIGNIFICANT CHANGE UP
HCO3 BLDMV-SCNC: 29 MMOL/L — SIGNIFICANT CHANGE UP
HCO3 BLDMV-SCNC: 30 MMOL/L — SIGNIFICANT CHANGE UP
HCO3 BLDMV-SCNC: 32 MMOL/L — SIGNIFICANT CHANGE UP
HCO3 BLDMV-SCNC: 36 MMOL/L — SIGNIFICANT CHANGE UP
HCO3 BLDMV-SCNC: 38 MMOL/L — SIGNIFICANT CHANGE UP
HCO3 BLDV-SCNC: 28 MMOL/L — SIGNIFICANT CHANGE UP (ref 22–29)
HCO3 BLDV-SCNC: 34 MMOL/L — HIGH (ref 22–29)
HCO3 BLDV-SCNC: 37 MMOL/L — HIGH (ref 22–29)
HCT VFR BLD CALC: 24.3 % — LOW (ref 42–52)
HCT VFR BLD CALC: 24.3 % — LOW (ref 42–52)
HCT VFR BLD CALC: 26.1 % — LOW (ref 42–52)
HCT VFR BLD CALC: 26.1 % — LOW (ref 42–52)
HCT VFR BLD CALC: 29.4 % — LOW (ref 42–52)
HCT VFR BLD CALC: 29.4 % — LOW (ref 42–52)
HCT VFR BLD CALC: 29.6 % — LOW (ref 42–52)
HCT VFR BLD CALC: 29.6 % — LOW (ref 42–52)
HCT VFR BLD CALC: 30.7 % — LOW (ref 42–52)
HCT VFR BLD CALC: 30.7 % — LOW (ref 42–52)
HCT VFR BLD CALC: 32.8 % — LOW (ref 42–52)
HCT VFR BLD CALC: 32.8 % — LOW (ref 42–52)
HCT VFR BLD CALC: 33.3 % — LOW (ref 42–52)
HCT VFR BLD CALC: 33.3 % — LOW (ref 42–52)
HCT VFR BLD CALC: 33.4 % — LOW (ref 42–52)
HCT VFR BLD CALC: 33.6 % — LOW (ref 42–52)
HCT VFR BLD CALC: 33.7 % — LOW (ref 42–52)
HCT VFR BLD CALC: 34.4 % — LOW (ref 42–52)
HCT VFR BLD CALC: 34.7 % — LOW (ref 42–52)
HCT VFR BLD CALC: 34.9 % — LOW (ref 42–52)
HCT VFR BLD CALC: 35 % — LOW (ref 42–52)
HCT VFR BLD CALC: 35.2 % — LOW (ref 42–52)
HCT VFR BLD CALC: 35.7 % — LOW (ref 42–52)
HCT VFR BLD CALC: 35.9 % — LOW (ref 42–52)
HCT VFR BLD CALC: 35.9 % — LOW (ref 42–52)
HCT VFR BLD CALC: 36.1 % — LOW (ref 42–52)
HCT VFR BLD CALC: 36.2 % — LOW (ref 42–52)
HCT VFR BLD CALC: 36.3 % — LOW (ref 42–52)
HCT VFR BLD CALC: 36.5 % — LOW (ref 42–52)
HCT VFR BLD CALC: 36.6 % — LOW (ref 42–52)
HCT VFR BLD CALC: 36.9 % — LOW (ref 42–52)
HCT VFR BLD CALC: 37.1 % — LOW (ref 42–52)
HCT VFR BLD CALC: 37.3 % — LOW (ref 42–52)
HCT VFR BLD CALC: 37.4 % — LOW (ref 42–52)
HCT VFR BLD CALC: 37.5 % — LOW (ref 42–52)
HCT VFR BLD CALC: 37.7 % — LOW (ref 42–52)
HCT VFR BLD CALC: 37.7 % — LOW (ref 42–52)
HCT VFR BLD CALC: 37.9 % — LOW (ref 42–52)
HCT VFR BLD CALC: 38 % — LOW (ref 42–52)
HCT VFR BLD CALC: 38.1 % — LOW (ref 42–52)
HCT VFR BLD CALC: 38.5 % — LOW (ref 42–52)
HCT VFR BLD CALC: 38.6 % — LOW (ref 42–52)
HCT VFR BLD CALC: 39.1 % — LOW (ref 42–52)
HCT VFR BLD CALC: 39.1 % — LOW (ref 42–52)
HCT VFR BLD CALC: 39.2 % — LOW (ref 42–52)
HCT VFR BLD CALC: 39.2 % — LOW (ref 42–52)
HCT VFR BLD CALC: 39.6 % — LOW (ref 42–52)
HCT VFR BLD CALC: 39.7 % — LOW (ref 42–52)
HCT VFR BLD CALC: 39.8 % — LOW (ref 42–52)
HCT VFR BLD CALC: 40.1 % — LOW (ref 42–52)
HCT VFR BLD CALC: 40.3 % — LOW (ref 42–52)
HCT VFR BLD CALC: 40.5 % — LOW (ref 42–52)
HCT VFR BLD CALC: 40.9 % — LOW (ref 42–52)
HCT VFR BLD CALC: 41.5 % — LOW (ref 42–52)
HCT VFR BLD CALC: 41.6 % — LOW (ref 42–52)
HCT VFR BLD CALC: 41.9 % — LOW (ref 42–52)
HCT VFR BLD CALC: 42.3 % — SIGNIFICANT CHANGE UP (ref 42–52)
HCT VFR BLD CALC: 42.8 % — SIGNIFICANT CHANGE UP (ref 42–52)
HCT VFR BLD CALC: 42.9 % — SIGNIFICANT CHANGE UP (ref 42–52)
HCT VFR BLD CALC: 43.2 % — SIGNIFICANT CHANGE UP (ref 42–52)
HCT VFR BLDA CALC: 31 % — LOW (ref 39–51)
HCT VFR BLDA CALC: 33 % — LOW (ref 39–51)
HCT VFR BLDA CALC: 37 % — LOW (ref 39–51)
HCV AB SER QL: NONREACTIVE
HCV S/CO RATIO: 0.43 S/CO
HDLC SERPL-MCNC: 30 MG/DL — LOW
HDLC SERPL-MCNC: 37 MG/DL — LOW
HGB BLD CALC-MCNC: 10.3 G/DL — LOW (ref 12.6–17.4)
HGB BLD CALC-MCNC: 11.1 G/DL — LOW (ref 12.6–17.4)
HGB BLD CALC-MCNC: 12.3 G/DL — LOW (ref 12.6–17.4)
HGB BLD-MCNC: 10.2 G/DL — LOW (ref 14–18)
HGB BLD-MCNC: 10.2 G/DL — LOW (ref 14–18)
HGB BLD-MCNC: 10.3 G/DL — LOW (ref 14–18)
HGB BLD-MCNC: 10.3 G/DL — LOW (ref 14–18)
HGB BLD-MCNC: 10.4 G/DL — LOW (ref 14–18)
HGB BLD-MCNC: 10.5 G/DL — LOW (ref 14–18)
HGB BLD-MCNC: 10.6 G/DL — LOW (ref 14–18)
HGB BLD-MCNC: 10.7 G/DL — LOW (ref 14–18)
HGB BLD-MCNC: 10.8 G/DL — LOW (ref 14–18)
HGB BLD-MCNC: 10.8 G/DL — LOW (ref 14–18)
HGB BLD-MCNC: 10.9 G/DL — LOW (ref 14–18)
HGB BLD-MCNC: 10.9 G/DL — LOW (ref 14–18)
HGB BLD-MCNC: 11 G/DL — LOW (ref 14–18)
HGB BLD-MCNC: 11.1 G/DL — LOW (ref 14–18)
HGB BLD-MCNC: 11.2 G/DL — LOW (ref 14–18)
HGB BLD-MCNC: 11.3 G/DL — LOW (ref 14–18)
HGB BLD-MCNC: 11.3 G/DL — LOW (ref 14–18)
HGB BLD-MCNC: 11.4 G/DL — LOW (ref 14–18)
HGB BLD-MCNC: 11.5 G/DL — LOW (ref 14–18)
HGB BLD-MCNC: 11.6 G/DL — LOW (ref 14–18)
HGB BLD-MCNC: 11.7 G/DL — LOW (ref 14–18)
HGB BLD-MCNC: 11.7 G/DL — LOW (ref 14–18)
HGB BLD-MCNC: 11.8 G/DL — LOW (ref 14–18)
HGB BLD-MCNC: 11.9 G/DL — LOW (ref 14–18)
HGB BLD-MCNC: 12 G/DL — LOW (ref 14–18)
HGB BLD-MCNC: 12.1 G/DL — LOW (ref 14–18)
HGB BLD-MCNC: 12.3 G/DL — LOW (ref 14–18)
HGB BLD-MCNC: 12.5 G/DL — LOW (ref 14–18)
HGB BLD-MCNC: 12.8 G/DL — LOW (ref 14–18)
HGB BLD-MCNC: 7.9 G/DL — LOW (ref 14–18)
HGB BLD-MCNC: 7.9 G/DL — LOW (ref 14–18)
HGB BLD-MCNC: 8.1 G/DL — LOW (ref 14–18)
HGB BLD-MCNC: 8.1 G/DL — LOW (ref 14–18)
HGB BLD-MCNC: 9.4 G/DL — LOW (ref 14–18)
HGB BLD-MCNC: 9.5 G/DL — LOW (ref 14–18)
HGB BLD-MCNC: 9.5 G/DL — LOW (ref 14–18)
HGB BLD-MCNC: 9.6 G/DL — LOW (ref 14–18)
HGB BLD-MCNC: 9.7 G/DL — LOW (ref 14–18)
HGB BLD-MCNC: 9.8 G/DL — LOW (ref 14–18)
HGB BLD-MCNC: 9.9 G/DL — LOW (ref 14–18)
HIV 1+2 AB+HIV1 P24 AG SERPL QL IA: SIGNIFICANT CHANGE UP
HLA-B27 QL: NEGATIVE — SIGNIFICANT CHANGE UP
HOROWITZ INDEX BLDA+IHG-RTO: 40 — SIGNIFICANT CHANGE UP
HOROWITZ INDEX BLDMV+IHG-RTO: 40 — SIGNIFICANT CHANGE UP
HYALINE CASTS # UR AUTO: 20 /LPF — HIGH (ref 0–7)
HYALINE CASTS # UR AUTO: 4 /LPF — SIGNIFICANT CHANGE UP (ref 0–7)
HYPOCHROMIA BLD QL: SIGNIFICANT CHANGE UP
HYPOCHROMIA BLD QL: SLIGHT — SIGNIFICANT CHANGE UP
HYPOCHROMIA BLD QL: SLIGHT — SIGNIFICANT CHANGE UP
IGA FLD-MCNC: 303 MG/DL — SIGNIFICANT CHANGE UP (ref 84–499)
IGG FLD-MCNC: 1353 MG/DL — SIGNIFICANT CHANGE UP (ref 610–1660)
IGM SERPL-MCNC: 51 MG/DL — SIGNIFICANT CHANGE UP (ref 35–242)
IMM GRANULOCYTES NFR BLD AUTO: 0.1 % — SIGNIFICANT CHANGE UP (ref 0.1–0.3)
IMM GRANULOCYTES NFR BLD AUTO: 0.2 % — SIGNIFICANT CHANGE UP (ref 0.1–0.3)
IMM GRANULOCYTES NFR BLD AUTO: 0.3 % — SIGNIFICANT CHANGE UP (ref 0.1–0.3)
IMM GRANULOCYTES NFR BLD AUTO: 0.4 % — HIGH (ref 0.1–0.3)
IMM GRANULOCYTES NFR BLD AUTO: 0.5 % — HIGH (ref 0.1–0.3)
IMM GRANULOCYTES NFR BLD AUTO: 0.6 % — HIGH (ref 0.1–0.3)
IMM GRANULOCYTES NFR BLD AUTO: 0.6 % — HIGH (ref 0.1–0.3)
IMM GRANULOCYTES NFR BLD AUTO: 0.7 % — HIGH (ref 0.1–0.3)
IMM GRANULOCYTES NFR BLD AUTO: 0.7 % — HIGH (ref 0.1–0.3)
IMM GRANULOCYTES NFR BLD AUTO: 1.5 % — HIGH (ref 0.1–0.3)
IMM GRANULOCYTES NFR BLD AUTO: 1.5 % — HIGH (ref 0.1–0.3)
INR BLD: 1.24 RATIO — SIGNIFICANT CHANGE UP (ref 0.65–1.3)
INR BLD: 1.24 RATIO — SIGNIFICANT CHANGE UP (ref 0.65–1.3)
INR BLD: 1.37 RATIO — HIGH (ref 0.65–1.3)
INR BLD: 1.37 RATIO — HIGH (ref 0.65–1.3)
INR BLD: 1.53 RATIO — HIGH (ref 0.65–1.3)
INR BLD: 1.59 RATIO — HIGH (ref 0.65–1.3)
INR BLD: 1.95 RATIO — HIGH (ref 0.65–1.3)
INR BLD: 2.13 RATIO — HIGH (ref 0.65–1.3)
INR BLD: 2.29 RATIO — HIGH (ref 0.65–1.3)
INTERPRETATION 24H UR IFE-IMP: SIGNIFICANT CHANGE UP
INTERPRETATION 24H UR IFE-IMP: SIGNIFICANT CHANGE UP
INTERPRETATION SERPL IFE-IMP: SIGNIFICANT CHANGE UP
IRON SATN MFR SERPL: 13 UG/DL — LOW (ref 35–150)
IRON SATN MFR SERPL: 17 %
IRON SATN MFR SERPL: 27 %
IRON SATN MFR SERPL: 27 UG/DL — LOW (ref 35–150)
IRON SATN MFR SERPL: 3 % — LOW (ref 15–50)
IRON SATN MFR SERPL: 8 % — LOW (ref 15–50)
IRON SERPL-MCNC: 43 UG/DL
IRON SERPL-MCNC: 71 UG/DL
KAPPA LC SER QL IFE: 6.4 MG/DL — HIGH (ref 0.33–1.94)
KAPPA/LAMBDA FREE LIGHT CHAIN RATIO, SERUM: 1.37 RATIO — SIGNIFICANT CHANGE UP (ref 0.26–1.65)
KETONES UR-MCNC: NEGATIVE MG/DL — SIGNIFICANT CHANGE UP
KETONES UR-MCNC: NEGATIVE MG/DL — SIGNIFICANT CHANGE UP
KETONES UR-MCNC: NEGATIVE — SIGNIFICANT CHANGE UP
LACTATE BLDV-MCNC: 1.5 MMOL/L — SIGNIFICANT CHANGE UP (ref 0.5–2)
LACTATE BLDV-MCNC: 1.5 MMOL/L — SIGNIFICANT CHANGE UP (ref 0.5–2)
LACTATE BLDV-MCNC: 4.6 MMOL/L — CRITICAL HIGH (ref 0.5–2)
LACTATE SERPL-SCNC: 1.1 MMOL/L — SIGNIFICANT CHANGE UP (ref 0.7–2)
LACTATE SERPL-SCNC: 1.2 MMOL/L — SIGNIFICANT CHANGE UP (ref 0.7–2)
LACTATE SERPL-SCNC: 1.3 MMOL/L — SIGNIFICANT CHANGE UP (ref 0.7–2)
LACTATE SERPL-SCNC: 1.6 MMOL/L — SIGNIFICANT CHANGE UP (ref 0.7–2)
LACTATE SERPL-SCNC: 1.7 MMOL/L — SIGNIFICANT CHANGE UP (ref 0.7–2)
LACTATE SERPL-SCNC: 2 MMOL/L — SIGNIFICANT CHANGE UP (ref 0.7–2)
LACTATE SERPL-SCNC: 2.1 MMOL/L — HIGH (ref 0.7–2)
LACTATE SERPL-SCNC: 2.4 MMOL/L — HIGH (ref 0.7–2)
LACTATE SERPL-SCNC: 2.4 MMOL/L — HIGH (ref 0.7–2)
LACTATE SERPL-SCNC: 2.5 MMOL/L — HIGH (ref 0.7–2)
LACTATE SERPL-SCNC: 2.6 MMOL/L — HIGH (ref 0.7–2)
LACTATE SERPL-SCNC: 2.8 MMOL/L — HIGH (ref 0.7–2)
LACTATE SERPL-SCNC: 2.8 MMOL/L — HIGH (ref 0.7–2)
LACTATE SERPL-SCNC: 2.9 MMOL/L — HIGH (ref 0.7–2)
LACTATE SERPL-SCNC: 21.7 MMOL/L — CRITICAL HIGH (ref 0.7–2)
LACTATE SERPL-SCNC: 21.7 MMOL/L — CRITICAL HIGH (ref 0.7–2)
LACTATE SERPL-SCNC: 4.2 MMOL/L — CRITICAL HIGH (ref 0.7–2)
LACTATE SERPL-SCNC: 4.2 MMOL/L — CRITICAL HIGH (ref 0.7–2)
LACTATE SERPL-SCNC: 4.6 MMOL/L — CRITICAL HIGH (ref 0.7–2)
LAMBDA LC SER QL IFE: 4.66 MG/DL — HIGH (ref 0.57–2.63)
LEUKOCYTE ESTERASE UR-ACNC: ABNORMAL
LIDOCAIN IGE QN: 10 U/L — SIGNIFICANT CHANGE UP (ref 7–60)
LIPID PNL WITH DIRECT LDL SERPL: 41 MG/DL — SIGNIFICANT CHANGE UP
LIPID PNL WITH DIRECT LDL SERPL: 72 MG/DL — SIGNIFICANT CHANGE UP
LYMPHOCYTES # BLD AUTO: 0.14 K/UL — LOW (ref 1.2–3.4)
LYMPHOCYTES # BLD AUTO: 0.24 K/UL — LOW (ref 1.2–3.4)
LYMPHOCYTES # BLD AUTO: 0.24 K/UL — LOW (ref 1.2–3.4)
LYMPHOCYTES # BLD AUTO: 0.39 K/UL — LOW (ref 1.2–3.4)
LYMPHOCYTES # BLD AUTO: 0.39 K/UL — LOW (ref 1.2–3.4)
LYMPHOCYTES # BLD AUTO: 0.44 K/UL — LOW (ref 1.2–3.4)
LYMPHOCYTES # BLD AUTO: 0.5 K/UL — LOW (ref 1.2–3.4)
LYMPHOCYTES # BLD AUTO: 0.53 K/UL — LOW (ref 1.2–3.4)
LYMPHOCYTES # BLD AUTO: 0.55 K/UL — LOW (ref 1.2–3.4)
LYMPHOCYTES # BLD AUTO: 0.55 K/UL — LOW (ref 1.2–3.4)
LYMPHOCYTES # BLD AUTO: 0.6 K/UL — LOW (ref 1.2–3.4)
LYMPHOCYTES # BLD AUTO: 0.6 K/UL — LOW (ref 1.2–3.4)
LYMPHOCYTES # BLD AUTO: 0.62 K/UL — LOW (ref 1.2–3.4)
LYMPHOCYTES # BLD AUTO: 0.69 K/UL — LOW (ref 1.2–3.4)
LYMPHOCYTES # BLD AUTO: 0.71 K/UL — LOW (ref 1.2–3.4)
LYMPHOCYTES # BLD AUTO: 0.71 K/UL — LOW (ref 1.2–3.4)
LYMPHOCYTES # BLD AUTO: 0.77 K/UL — LOW (ref 1.2–3.4)
LYMPHOCYTES # BLD AUTO: 0.77 K/UL — LOW (ref 1.2–3.4)
LYMPHOCYTES # BLD AUTO: 0.78 K/UL — LOW (ref 1.2–3.4)
LYMPHOCYTES # BLD AUTO: 0.79 K/UL — LOW (ref 1.2–3.4)
LYMPHOCYTES # BLD AUTO: 0.8 K/UL — LOW (ref 1.2–3.4)
LYMPHOCYTES # BLD AUTO: 0.82 K/UL — LOW (ref 1.2–3.4)
LYMPHOCYTES # BLD AUTO: 0.83 K/UL — LOW (ref 1.2–3.4)
LYMPHOCYTES # BLD AUTO: 0.85 K/UL — LOW (ref 1.2–3.4)
LYMPHOCYTES # BLD AUTO: 0.86 K/UL — LOW (ref 1.2–3.4)
LYMPHOCYTES # BLD AUTO: 0.87 K/UL — LOW (ref 1.2–3.4)
LYMPHOCYTES # BLD AUTO: 0.89 K/UL — LOW (ref 1.2–3.4)
LYMPHOCYTES # BLD AUTO: 0.9 % — LOW (ref 20.5–51.1)
LYMPHOCYTES # BLD AUTO: 0.9 % — LOW (ref 20.5–51.1)
LYMPHOCYTES # BLD AUTO: 0.9 K/UL — LOW (ref 1.2–3.4)
LYMPHOCYTES # BLD AUTO: 0.91 K/UL — LOW (ref 1.2–3.4)
LYMPHOCYTES # BLD AUTO: 0.92 K/UL — LOW (ref 1.2–3.4)
LYMPHOCYTES # BLD AUTO: 0.93 K/UL — LOW (ref 1.2–3.4)
LYMPHOCYTES # BLD AUTO: 0.95 K/UL — LOW (ref 1.2–3.4)
LYMPHOCYTES # BLD AUTO: 0.98 K/UL — LOW (ref 1.2–3.4)
LYMPHOCYTES # BLD AUTO: 1.02 K/UL — LOW (ref 1.2–3.4)
LYMPHOCYTES # BLD AUTO: 1.1 K/UL — LOW (ref 1.2–3.4)
LYMPHOCYTES # BLD AUTO: 1.15 K/UL — LOW (ref 1.2–3.4)
LYMPHOCYTES # BLD AUTO: 1.15 K/UL — LOW (ref 1.2–3.4)
LYMPHOCYTES # BLD AUTO: 1.17 K/UL — LOW (ref 1.2–3.4)
LYMPHOCYTES # BLD AUTO: 1.21 K/UL — SIGNIFICANT CHANGE UP (ref 1.2–3.4)
LYMPHOCYTES # BLD AUTO: 1.24 K/UL — SIGNIFICANT CHANGE UP (ref 1.2–3.4)
LYMPHOCYTES # BLD AUTO: 1.24 K/UL — SIGNIFICANT CHANGE UP (ref 1.2–3.4)
LYMPHOCYTES # BLD AUTO: 1.28 K/UL — SIGNIFICANT CHANGE UP (ref 1.2–3.4)
LYMPHOCYTES # BLD AUTO: 1.29 K/UL — SIGNIFICANT CHANGE UP (ref 1.2–3.4)
LYMPHOCYTES # BLD AUTO: 1.29 K/UL — SIGNIFICANT CHANGE UP (ref 1.2–3.4)
LYMPHOCYTES # BLD AUTO: 1.7 % — LOW (ref 20.5–51.1)
LYMPHOCYTES # BLD AUTO: 10.3 % — LOW (ref 20.5–51.1)
LYMPHOCYTES # BLD AUTO: 10.5 % — LOW (ref 20.5–51.1)
LYMPHOCYTES # BLD AUTO: 10.6 % — LOW (ref 20.5–51.1)
LYMPHOCYTES # BLD AUTO: 10.7 % — LOW (ref 20.5–51.1)
LYMPHOCYTES # BLD AUTO: 10.8 % — LOW (ref 20.5–51.1)
LYMPHOCYTES # BLD AUTO: 10.9 % — LOW (ref 20.5–51.1)
LYMPHOCYTES # BLD AUTO: 11.3 % — LOW (ref 20.5–51.1)
LYMPHOCYTES # BLD AUTO: 11.4 % — LOW (ref 20.5–51.1)
LYMPHOCYTES # BLD AUTO: 11.9 % — LOW (ref 20.5–51.1)
LYMPHOCYTES # BLD AUTO: 12 % — LOW (ref 20.5–51.1)
LYMPHOCYTES # BLD AUTO: 12 % — LOW (ref 20.5–51.1)
LYMPHOCYTES # BLD AUTO: 12.5 % — LOW (ref 20.5–51.1)
LYMPHOCYTES # BLD AUTO: 13 % — LOW (ref 20.5–51.1)
LYMPHOCYTES # BLD AUTO: 13.2 % — LOW (ref 20.5–51.1)
LYMPHOCYTES # BLD AUTO: 14.3 % — LOW (ref 20.5–51.1)
LYMPHOCYTES # BLD AUTO: 14.8 % — LOW (ref 20.5–51.1)
LYMPHOCYTES # BLD AUTO: 15 % — LOW (ref 20.5–51.1)
LYMPHOCYTES # BLD AUTO: 15 % — LOW (ref 20.5–51.1)
LYMPHOCYTES # BLD AUTO: 17.1 % — LOW (ref 20.5–51.1)
LYMPHOCYTES # BLD AUTO: 2.8 % — LOW (ref 20.5–51.1)
LYMPHOCYTES # BLD AUTO: 2.8 % — LOW (ref 20.5–51.1)
LYMPHOCYTES # BLD AUTO: 3.5 % — LOW (ref 20.5–51.1)
LYMPHOCYTES # BLD AUTO: 4.2 % — LOW (ref 20.5–51.1)
LYMPHOCYTES # BLD AUTO: 4.5 % — LOW (ref 20.5–51.1)
LYMPHOCYTES # BLD AUTO: 4.6 % — LOW (ref 20.5–51.1)
LYMPHOCYTES # BLD AUTO: 4.6 % — LOW (ref 20.5–51.1)
LYMPHOCYTES # BLD AUTO: 5.3 % — LOW (ref 20.5–51.1)
LYMPHOCYTES # BLD AUTO: 5.5 % — LOW (ref 20.5–51.1)
LYMPHOCYTES # BLD AUTO: 5.8 % — LOW (ref 20.5–51.1)
LYMPHOCYTES # BLD AUTO: 6.5 % — LOW (ref 20.5–51.1)
LYMPHOCYTES # BLD AUTO: 6.8 % — LOW (ref 20.5–51.1)
LYMPHOCYTES # BLD AUTO: 7.1 % — LOW (ref 20.5–51.1)
LYMPHOCYTES # BLD AUTO: 7.1 % — LOW (ref 20.5–51.1)
LYMPHOCYTES # BLD AUTO: 7.2 % — LOW (ref 20.5–51.1)
LYMPHOCYTES # BLD AUTO: 7.2 % — LOW (ref 20.5–51.1)
LYMPHOCYTES # BLD AUTO: 8.3 % — LOW (ref 20.5–51.1)
LYMPHOCYTES # BLD AUTO: 8.4 % — LOW (ref 20.5–51.1)
LYMPHOCYTES # BLD AUTO: 8.5 % — LOW (ref 20.5–51.1)
LYMPHOCYTES # BLD AUTO: 8.5 % — LOW (ref 20.5–51.1)
LYMPHOCYTES # BLD AUTO: 8.6 % — LOW (ref 20.5–51.1)
LYMPHOCYTES # BLD AUTO: 9 % — LOW (ref 20.5–51.1)
LYMPHOCYTES # BLD AUTO: 9.5 % — LOW (ref 20.5–51.1)
LYMPHOCYTES # BLD AUTO: 9.6 % — LOW (ref 20.5–51.1)
M PROTEIN 24H UR ELPH-MRATE: SIGNIFICANT CHANGE UP
M TB IFN-G BLD-IMP: ABNORMAL
MACROCYTES BLD QL: SIGNIFICANT CHANGE UP
MACROCYTES BLD QL: SLIGHT — SIGNIFICANT CHANGE UP
MAGNESIUM SERPL-MCNC: 1.6 MG/DL — LOW (ref 1.8–2.4)
MAGNESIUM SERPL-MCNC: 1.6 MG/DL — LOW (ref 1.8–2.4)
MAGNESIUM SERPL-MCNC: 1.7 MG/DL — LOW (ref 1.8–2.4)
MAGNESIUM SERPL-MCNC: 1.8 MG/DL — SIGNIFICANT CHANGE UP (ref 1.8–2.4)
MAGNESIUM SERPL-MCNC: 1.9 MG/DL — SIGNIFICANT CHANGE UP (ref 1.8–2.4)
MAGNESIUM SERPL-MCNC: 2 MG/DL — SIGNIFICANT CHANGE UP (ref 1.8–2.4)
MAGNESIUM SERPL-MCNC: 2.1 MG/DL
MAGNESIUM SERPL-MCNC: 2.1 MG/DL — SIGNIFICANT CHANGE UP (ref 1.8–2.4)
MAGNESIUM SERPL-MCNC: 2.2 MG/DL — SIGNIFICANT CHANGE UP (ref 1.8–2.4)
MAGNESIUM SERPL-MCNC: 2.3 MG/DL — SIGNIFICANT CHANGE UP (ref 1.8–2.4)
MAGNESIUM SERPL-MCNC: 2.4 MG/DL — SIGNIFICANT CHANGE UP (ref 1.8–2.4)
MAGNESIUM SERPL-MCNC: 2.5 MG/DL
MAGNESIUM SERPL-MCNC: 2.5 MG/DL — HIGH (ref 1.8–2.4)
MAGNESIUM SERPL-MCNC: 2.5 MG/DL — HIGH (ref 1.8–2.4)
MAGNESIUM SERPL-MCNC: 3.6 MG/DL — CRITICAL HIGH (ref 1.8–2.4)
MAGNESIUM SERPL-MCNC: 3.6 MG/DL — CRITICAL HIGH (ref 1.8–2.4)
MANUAL SMEAR VERIFICATION: SIGNIFICANT CHANGE UP
MCHC RBC-ENTMCNC: 18.2 PG — LOW (ref 27–31)
MCHC RBC-ENTMCNC: 18.2 PG — LOW (ref 27–31)
MCHC RBC-ENTMCNC: 18.3 PG — LOW (ref 27–31)
MCHC RBC-ENTMCNC: 18.4 PG — LOW (ref 27–31)
MCHC RBC-ENTMCNC: 18.5 PG — LOW (ref 27–31)
MCHC RBC-ENTMCNC: 18.6 PG — LOW (ref 27–31)
MCHC RBC-ENTMCNC: 18.6 PG — LOW (ref 27–31)
MCHC RBC-ENTMCNC: 18.9 PG — LOW (ref 27–31)
MCHC RBC-ENTMCNC: 19.6 PG — LOW (ref 27–31)
MCHC RBC-ENTMCNC: 19.9 PG — LOW (ref 27–31)
MCHC RBC-ENTMCNC: 19.9 PG — LOW (ref 27–31)
MCHC RBC-ENTMCNC: 20 PG — LOW (ref 27–31)
MCHC RBC-ENTMCNC: 20.1 PG — LOW (ref 27–31)
MCHC RBC-ENTMCNC: 20.2 PG — LOW (ref 27–31)
MCHC RBC-ENTMCNC: 20.3 PG — LOW (ref 27–31)
MCHC RBC-ENTMCNC: 20.4 PG — LOW (ref 27–31)
MCHC RBC-ENTMCNC: 20.7 PG — LOW (ref 27–31)
MCHC RBC-ENTMCNC: 20.7 PG — LOW (ref 27–31)
MCHC RBC-ENTMCNC: 21.2 PG — LOW (ref 27–31)
MCHC RBC-ENTMCNC: 21.5 PG — LOW (ref 27–31)
MCHC RBC-ENTMCNC: 21.7 PG — LOW (ref 27–31)
MCHC RBC-ENTMCNC: 22 PG — LOW (ref 27–31)
MCHC RBC-ENTMCNC: 22.1 PG — LOW (ref 27–31)
MCHC RBC-ENTMCNC: 22.6 PG — LOW (ref 27–31)
MCHC RBC-ENTMCNC: 22.6 PG — LOW (ref 27–31)
MCHC RBC-ENTMCNC: 22.8 PG — LOW (ref 27–31)
MCHC RBC-ENTMCNC: 23 PG — LOW (ref 27–31)
MCHC RBC-ENTMCNC: 23.2 PG — LOW (ref 27–31)
MCHC RBC-ENTMCNC: 27.9 G/DL — LOW (ref 32–37)
MCHC RBC-ENTMCNC: 28 G/DL — LOW (ref 32–37)
MCHC RBC-ENTMCNC: 28.1 G/DL — LOW (ref 32–37)
MCHC RBC-ENTMCNC: 28.1 PG — SIGNIFICANT CHANGE UP (ref 27–31)
MCHC RBC-ENTMCNC: 28.1 PG — SIGNIFICANT CHANGE UP (ref 27–31)
MCHC RBC-ENTMCNC: 28.2 G/DL — LOW (ref 32–37)
MCHC RBC-ENTMCNC: 28.2 G/DL — LOW (ref 32–37)
MCHC RBC-ENTMCNC: 28.3 G/DL — LOW (ref 32–37)
MCHC RBC-ENTMCNC: 28.3 G/DL — LOW (ref 32–37)
MCHC RBC-ENTMCNC: 28.4 G/DL — LOW (ref 32–37)
MCHC RBC-ENTMCNC: 28.5 G/DL — LOW (ref 32–37)
MCHC RBC-ENTMCNC: 28.5 G/DL — LOW (ref 32–37)
MCHC RBC-ENTMCNC: 28.6 G/DL — LOW (ref 32–37)
MCHC RBC-ENTMCNC: 28.6 PG — SIGNIFICANT CHANGE UP (ref 27–31)
MCHC RBC-ENTMCNC: 28.6 PG — SIGNIFICANT CHANGE UP (ref 27–31)
MCHC RBC-ENTMCNC: 28.7 G/DL — LOW (ref 32–37)
MCHC RBC-ENTMCNC: 28.8 G/DL — LOW (ref 32–37)
MCHC RBC-ENTMCNC: 28.8 PG — SIGNIFICANT CHANGE UP (ref 27–31)
MCHC RBC-ENTMCNC: 28.8 PG — SIGNIFICANT CHANGE UP (ref 27–31)
MCHC RBC-ENTMCNC: 28.9 G/DL — LOW (ref 32–37)
MCHC RBC-ENTMCNC: 28.9 PG — SIGNIFICANT CHANGE UP (ref 27–31)
MCHC RBC-ENTMCNC: 28.9 PG — SIGNIFICANT CHANGE UP (ref 27–31)
MCHC RBC-ENTMCNC: 29 G/DL — LOW (ref 32–37)
MCHC RBC-ENTMCNC: 29.1 G/DL — LOW (ref 32–37)
MCHC RBC-ENTMCNC: 29.2 G/DL — LOW (ref 32–37)
MCHC RBC-ENTMCNC: 29.2 PG — SIGNIFICANT CHANGE UP (ref 27–31)
MCHC RBC-ENTMCNC: 29.2 PG — SIGNIFICANT CHANGE UP (ref 27–31)
MCHC RBC-ENTMCNC: 29.3 G/DL — LOW (ref 32–37)
MCHC RBC-ENTMCNC: 29.3 G/DL — LOW (ref 32–37)
MCHC RBC-ENTMCNC: 29.4 G/DL — LOW (ref 32–37)
MCHC RBC-ENTMCNC: 29.6 G/DL — LOW (ref 32–37)
MCHC RBC-ENTMCNC: 29.6 G/DL — LOW (ref 32–37)
MCHC RBC-ENTMCNC: 29.7 G/DL — LOW (ref 32–37)
MCHC RBC-ENTMCNC: 29.9 G/DL — LOW (ref 32–37)
MCHC RBC-ENTMCNC: 30 G/DL — LOW (ref 32–37)
MCHC RBC-ENTMCNC: 30.2 G/DL — LOW (ref 32–37)
MCHC RBC-ENTMCNC: 31 G/DL — LOW (ref 32–37)
MCHC RBC-ENTMCNC: 31 G/DL — LOW (ref 32–37)
MCHC RBC-ENTMCNC: 32.1 G/DL — SIGNIFICANT CHANGE UP (ref 32–37)
MCHC RBC-ENTMCNC: 32.1 G/DL — SIGNIFICANT CHANGE UP (ref 32–37)
MCHC RBC-ENTMCNC: 32.2 G/DL — SIGNIFICANT CHANGE UP (ref 32–37)
MCHC RBC-ENTMCNC: 32.2 G/DL — SIGNIFICANT CHANGE UP (ref 32–37)
MCHC RBC-ENTMCNC: 32.5 G/DL — SIGNIFICANT CHANGE UP (ref 32–37)
MCHC RBC-ENTMCNC: 32.5 G/DL — SIGNIFICANT CHANGE UP (ref 32–37)
MCHC RBC-ENTMCNC: 33.3 G/DL — SIGNIFICANT CHANGE UP (ref 32–37)
MCHC RBC-ENTMCNC: 33.3 G/DL — SIGNIFICANT CHANGE UP (ref 32–37)
MCV RBC AUTO: 63.4 FL — LOW (ref 80–94)
MCV RBC AUTO: 63.5 FL — LOW (ref 80–94)
MCV RBC AUTO: 63.6 FL — LOW (ref 80–94)
MCV RBC AUTO: 63.8 FL — LOW (ref 80–94)
MCV RBC AUTO: 64.2 FL — LOW (ref 80–94)
MCV RBC AUTO: 64.2 FL — LOW (ref 80–94)
MCV RBC AUTO: 64.4 FL — LOW (ref 80–94)
MCV RBC AUTO: 64.4 FL — LOW (ref 80–94)
MCV RBC AUTO: 64.6 FL — LOW (ref 80–94)
MCV RBC AUTO: 64.6 FL — LOW (ref 80–94)
MCV RBC AUTO: 64.7 FL — LOW (ref 80–94)
MCV RBC AUTO: 64.7 FL — LOW (ref 80–94)
MCV RBC AUTO: 64.8 FL — LOW (ref 80–94)
MCV RBC AUTO: 65 FL — LOW (ref 80–94)
MCV RBC AUTO: 65.7 FL — LOW (ref 80–94)
MCV RBC AUTO: 68.5 FL — LOW (ref 80–94)
MCV RBC AUTO: 68.7 FL — LOW (ref 80–94)
MCV RBC AUTO: 68.8 FL — LOW (ref 80–94)
MCV RBC AUTO: 68.9 FL — LOW (ref 80–94)
MCV RBC AUTO: 69.2 FL — LOW (ref 80–94)
MCV RBC AUTO: 69.4 FL — LOW (ref 80–94)
MCV RBC AUTO: 69.6 FL — LOW (ref 80–94)
MCV RBC AUTO: 69.8 FL — LOW (ref 80–94)
MCV RBC AUTO: 69.9 FL — LOW (ref 80–94)
MCV RBC AUTO: 70 FL — LOW (ref 80–94)
MCV RBC AUTO: 70.1 FL — LOW (ref 80–94)
MCV RBC AUTO: 70.2 FL — LOW (ref 80–94)
MCV RBC AUTO: 70.3 FL — LOW (ref 80–94)
MCV RBC AUTO: 70.3 FL — LOW (ref 80–94)
MCV RBC AUTO: 70.9 FL — LOW (ref 80–94)
MCV RBC AUTO: 71.3 FL — LOW (ref 80–94)
MCV RBC AUTO: 71.4 FL — LOW (ref 80–94)
MCV RBC AUTO: 72.6 FL — LOW (ref 80–94)
MCV RBC AUTO: 73.8 FL — LOW (ref 80–94)
MCV RBC AUTO: 74.2 FL — LOW (ref 80–94)
MCV RBC AUTO: 74.4 FL — LOW (ref 80–94)
MCV RBC AUTO: 75.4 FL — LOW (ref 80–94)
MCV RBC AUTO: 75.5 FL — LOW (ref 80–94)
MCV RBC AUTO: 76.9 FL — LOW (ref 80–94)
MCV RBC AUTO: 77 FL — LOW (ref 80–94)
MCV RBC AUTO: 77.6 FL — LOW (ref 80–94)
MCV RBC AUTO: 77.8 FL — LOW (ref 80–94)
MCV RBC AUTO: 78.2 FL — LOW (ref 80–94)
MCV RBC AUTO: 79.5 FL — LOW (ref 80–94)
MCV RBC AUTO: 85.7 FL — SIGNIFICANT CHANGE UP (ref 80–94)
MCV RBC AUTO: 85.7 FL — SIGNIFICANT CHANGE UP (ref 80–94)
MCV RBC AUTO: 87.2 FL — SIGNIFICANT CHANGE UP (ref 80–94)
MCV RBC AUTO: 87.2 FL — SIGNIFICANT CHANGE UP (ref 80–94)
MCV RBC AUTO: 88.7 FL — SIGNIFICANT CHANGE UP (ref 80–94)
MCV RBC AUTO: 88.7 FL — SIGNIFICANT CHANGE UP (ref 80–94)
MCV RBC AUTO: 90 FL — SIGNIFICANT CHANGE UP (ref 80–94)
MCV RBC AUTO: 90 FL — SIGNIFICANT CHANGE UP (ref 80–94)
MCV RBC AUTO: 94.2 FL — HIGH (ref 80–94)
MCV RBC AUTO: 94.2 FL — HIGH (ref 80–94)
METAMYELOCYTES # FLD: 0.9 % — HIGH (ref 0–0)
METHOD TYPE: SIGNIFICANT CHANGE UP
MICROCYTES BLD QL: SLIGHT — SIGNIFICANT CHANGE UP
MONOCYTES # BLD AUTO: 0.08 K/UL — LOW (ref 0.1–0.6)
MONOCYTES # BLD AUTO: 0.25 K/UL — SIGNIFICANT CHANGE UP (ref 0.1–0.6)
MONOCYTES # BLD AUTO: 0.28 K/UL — SIGNIFICANT CHANGE UP (ref 0.1–0.6)
MONOCYTES # BLD AUTO: 0.36 K/UL — SIGNIFICANT CHANGE UP (ref 0.1–0.6)
MONOCYTES # BLD AUTO: 0.44 K/UL — SIGNIFICANT CHANGE UP (ref 0.1–0.6)
MONOCYTES # BLD AUTO: 0.51 K/UL — SIGNIFICANT CHANGE UP (ref 0.1–0.6)
MONOCYTES # BLD AUTO: 0.52 K/UL — SIGNIFICANT CHANGE UP (ref 0.1–0.6)
MONOCYTES # BLD AUTO: 0.63 K/UL — HIGH (ref 0.1–0.6)
MONOCYTES # BLD AUTO: 0.68 K/UL — SIGNIFICANT CHANGE UP (ref 0.1–0.6)
MONOCYTES # BLD AUTO: 0.71 K/UL — HIGH (ref 0.1–0.6)
MONOCYTES # BLD AUTO: 0.76 K/UL — HIGH (ref 0.1–0.6)
MONOCYTES # BLD AUTO: 0.77 K/UL — HIGH (ref 0.1–0.6)
MONOCYTES # BLD AUTO: 0.78 K/UL — HIGH (ref 0.1–0.6)
MONOCYTES # BLD AUTO: 0.81 K/UL — HIGH (ref 0.1–0.6)
MONOCYTES # BLD AUTO: 0.83 K/UL — HIGH (ref 0.1–0.6)
MONOCYTES # BLD AUTO: 0.84 K/UL — HIGH (ref 0.1–0.6)
MONOCYTES # BLD AUTO: 0.86 K/UL — HIGH (ref 0.1–0.6)
MONOCYTES # BLD AUTO: 0.88 K/UL — HIGH (ref 0.1–0.6)
MONOCYTES # BLD AUTO: 0.91 K/UL — HIGH (ref 0.1–0.6)
MONOCYTES # BLD AUTO: 0.93 K/UL — HIGH (ref 0.1–0.6)
MONOCYTES # BLD AUTO: 0.94 K/UL — HIGH (ref 0.1–0.6)
MONOCYTES # BLD AUTO: 0.97 K/UL — HIGH (ref 0.1–0.6)
MONOCYTES # BLD AUTO: 1.01 K/UL — HIGH (ref 0.1–0.6)
MONOCYTES # BLD AUTO: 1.08 K/UL — HIGH (ref 0.1–0.6)
MONOCYTES # BLD AUTO: 1.11 K/UL — HIGH (ref 0.1–0.6)
MONOCYTES # BLD AUTO: 1.13 K/UL — HIGH (ref 0.1–0.6)
MONOCYTES # BLD AUTO: 1.15 K/UL — HIGH (ref 0.1–0.6)
MONOCYTES # BLD AUTO: 1.15 K/UL — HIGH (ref 0.1–0.6)
MONOCYTES # BLD AUTO: 1.17 K/UL — HIGH (ref 0.1–0.6)
MONOCYTES # BLD AUTO: 1.18 K/UL — HIGH (ref 0.1–0.6)
MONOCYTES # BLD AUTO: 1.2 K/UL — HIGH (ref 0.1–0.6)
MONOCYTES # BLD AUTO: 1.21 K/UL — HIGH (ref 0.1–0.6)
MONOCYTES # BLD AUTO: 1.35 K/UL — HIGH (ref 0.1–0.6)
MONOCYTES # BLD AUTO: 1.38 K/UL — HIGH (ref 0.1–0.6)
MONOCYTES # BLD AUTO: 1.55 K/UL — HIGH (ref 0.1–0.6)
MONOCYTES # BLD AUTO: 1.55 K/UL — HIGH (ref 0.1–0.6)
MONOCYTES # BLD AUTO: 1.65 K/UL — HIGH (ref 0.1–0.6)
MONOCYTES # BLD AUTO: 1.65 K/UL — HIGH (ref 0.1–0.6)
MONOCYTES # BLD AUTO: 1.93 K/UL — HIGH (ref 0.1–0.6)
MONOCYTES # BLD AUTO: 1.93 K/UL — HIGH (ref 0.1–0.6)
MONOCYTES # BLD AUTO: 2.88 K/UL — HIGH (ref 0.1–0.6)
MONOCYTES # BLD AUTO: 2.88 K/UL — HIGH (ref 0.1–0.6)
MONOCYTES NFR BLD AUTO: 0.9 % — LOW (ref 1.7–9.3)
MONOCYTES NFR BLD AUTO: 10 % — HIGH (ref 1.7–9.3)
MONOCYTES NFR BLD AUTO: 10.2 % — HIGH (ref 1.7–9.3)
MONOCYTES NFR BLD AUTO: 10.4 % — HIGH (ref 1.7–9.3)
MONOCYTES NFR BLD AUTO: 10.5 % — HIGH (ref 1.7–9.3)
MONOCYTES NFR BLD AUTO: 11.1 % — HIGH (ref 1.7–9.3)
MONOCYTES NFR BLD AUTO: 11.2 % — HIGH (ref 1.7–9.3)
MONOCYTES NFR BLD AUTO: 11.3 % — HIGH (ref 1.7–9.3)
MONOCYTES NFR BLD AUTO: 11.4 % — HIGH (ref 1.7–9.3)
MONOCYTES NFR BLD AUTO: 11.5 % — HIGH (ref 1.7–9.3)
MONOCYTES NFR BLD AUTO: 11.5 % — HIGH (ref 1.7–9.3)
MONOCYTES NFR BLD AUTO: 11.6 % — HIGH (ref 1.7–9.3)
MONOCYTES NFR BLD AUTO: 11.7 % — HIGH (ref 1.7–9.3)
MONOCYTES NFR BLD AUTO: 12 % — HIGH (ref 1.7–9.3)
MONOCYTES NFR BLD AUTO: 12.2 % — HIGH (ref 1.7–9.3)
MONOCYTES NFR BLD AUTO: 12.7 % — HIGH (ref 1.7–9.3)
MONOCYTES NFR BLD AUTO: 13.2 % — HIGH (ref 1.7–9.3)
MONOCYTES NFR BLD AUTO: 13.5 % — HIGH (ref 1.7–9.3)
MONOCYTES NFR BLD AUTO: 13.6 % — HIGH (ref 1.7–9.3)
MONOCYTES NFR BLD AUTO: 13.7 % — HIGH (ref 1.7–9.3)
MONOCYTES NFR BLD AUTO: 14 % — HIGH (ref 1.7–9.3)
MONOCYTES NFR BLD AUTO: 14 % — HIGH (ref 1.7–9.3)
MONOCYTES NFR BLD AUTO: 14.9 % — HIGH (ref 1.7–9.3)
MONOCYTES NFR BLD AUTO: 14.9 % — HIGH (ref 1.7–9.3)
MONOCYTES NFR BLD AUTO: 3.9 % — SIGNIFICANT CHANGE UP (ref 1.7–9.3)
MONOCYTES NFR BLD AUTO: 4.3 % — SIGNIFICANT CHANGE UP (ref 1.7–9.3)
MONOCYTES NFR BLD AUTO: 4.4 % — SIGNIFICANT CHANGE UP (ref 1.7–9.3)
MONOCYTES NFR BLD AUTO: 4.6 % — SIGNIFICANT CHANGE UP (ref 1.7–9.3)
MONOCYTES NFR BLD AUTO: 4.7 % — SIGNIFICANT CHANGE UP (ref 1.7–9.3)
MONOCYTES NFR BLD AUTO: 5.5 % — SIGNIFICANT CHANGE UP (ref 1.7–9.3)
MONOCYTES NFR BLD AUTO: 5.6 % — SIGNIFICANT CHANGE UP (ref 1.7–9.3)
MONOCYTES NFR BLD AUTO: 6.1 % — SIGNIFICANT CHANGE UP (ref 1.7–9.3)
MONOCYTES NFR BLD AUTO: 6.1 % — SIGNIFICANT CHANGE UP (ref 1.7–9.3)
MONOCYTES NFR BLD AUTO: 6.5 % — SIGNIFICANT CHANGE UP (ref 1.7–9.3)
MONOCYTES NFR BLD AUTO: 6.5 % — SIGNIFICANT CHANGE UP (ref 1.7–9.3)
MONOCYTES NFR BLD AUTO: 6.8 % — SIGNIFICANT CHANGE UP (ref 1.7–9.3)
MONOCYTES NFR BLD AUTO: 6.9 % — SIGNIFICANT CHANGE UP (ref 1.7–9.3)
MONOCYTES NFR BLD AUTO: 6.9 % — SIGNIFICANT CHANGE UP (ref 1.7–9.3)
MONOCYTES NFR BLD AUTO: 7.4 % — SIGNIFICANT CHANGE UP (ref 1.7–9.3)
MONOCYTES NFR BLD AUTO: 8.6 % — SIGNIFICANT CHANGE UP (ref 1.7–9.3)
MONOCYTES NFR BLD AUTO: 8.6 % — SIGNIFICANT CHANGE UP (ref 1.7–9.3)
MONOCYTES NFR BLD AUTO: 9.3 % — SIGNIFICANT CHANGE UP (ref 1.7–9.3)
MONOCYTES NFR BLD AUTO: 9.4 % — HIGH (ref 1.7–9.3)
MONOCYTES NFR BLD AUTO: 9.6 % — HIGH (ref 1.7–9.3)
MONOCYTES NFR BLD AUTO: 9.7 % — HIGH (ref 1.7–9.3)
MONOCYTES NFR BLD AUTO: 9.7 % — HIGH (ref 1.7–9.3)
MRSA PCR RESULT.: POSITIVE
MRSA SPEC QL CULT: SIGNIFICANT CHANGE UP
MRSA SPEC QL CULT: SIGNIFICANT CHANGE UP
NEUTROPHILS # BLD AUTO: 10.45 K/UL — HIGH (ref 1.4–6.5)
NEUTROPHILS # BLD AUTO: 24.13 K/UL — HIGH (ref 1.4–6.5)
NEUTROPHILS # BLD AUTO: 24.13 K/UL — HIGH (ref 1.4–6.5)
NEUTROPHILS # BLD AUTO: 25.17 K/UL — HIGH (ref 1.4–6.5)
NEUTROPHILS # BLD AUTO: 25.17 K/UL — HIGH (ref 1.4–6.5)
NEUTROPHILS # BLD AUTO: 37.7 K/UL — HIGH (ref 1.4–6.5)
NEUTROPHILS # BLD AUTO: 37.7 K/UL — HIGH (ref 1.4–6.5)
NEUTROPHILS # BLD AUTO: 4.42 K/UL — SIGNIFICANT CHANGE UP (ref 1.4–6.5)
NEUTROPHILS # BLD AUTO: 4.69 K/UL — SIGNIFICANT CHANGE UP (ref 1.4–6.5)
NEUTROPHILS # BLD AUTO: 4.91 K/UL — SIGNIFICANT CHANGE UP (ref 1.4–6.5)
NEUTROPHILS # BLD AUTO: 4.99 K/UL — SIGNIFICANT CHANGE UP (ref 1.4–6.5)
NEUTROPHILS # BLD AUTO: 5.03 K/UL — SIGNIFICANT CHANGE UP (ref 1.4–6.5)
NEUTROPHILS # BLD AUTO: 5.03 K/UL — SIGNIFICANT CHANGE UP (ref 1.4–6.5)
NEUTROPHILS # BLD AUTO: 5.3 K/UL — SIGNIFICANT CHANGE UP (ref 1.4–6.5)
NEUTROPHILS # BLD AUTO: 5.34 K/UL — SIGNIFICANT CHANGE UP (ref 1.4–6.5)
NEUTROPHILS # BLD AUTO: 5.38 K/UL — SIGNIFICANT CHANGE UP (ref 1.4–6.5)
NEUTROPHILS # BLD AUTO: 5.41 K/UL — SIGNIFICANT CHANGE UP (ref 1.4–6.5)
NEUTROPHILS # BLD AUTO: 5.42 K/UL — SIGNIFICANT CHANGE UP (ref 1.4–6.5)
NEUTROPHILS # BLD AUTO: 5.51 K/UL — SIGNIFICANT CHANGE UP (ref 1.4–6.5)
NEUTROPHILS # BLD AUTO: 5.51 K/UL — SIGNIFICANT CHANGE UP (ref 1.4–6.5)
NEUTROPHILS # BLD AUTO: 5.95 K/UL — SIGNIFICANT CHANGE UP (ref 1.4–6.5)
NEUTROPHILS # BLD AUTO: 5.96 K/UL — SIGNIFICANT CHANGE UP (ref 1.4–6.5)
NEUTROPHILS # BLD AUTO: 6.01 K/UL — SIGNIFICANT CHANGE UP (ref 1.4–6.5)
NEUTROPHILS # BLD AUTO: 6.11 K/UL — SIGNIFICANT CHANGE UP (ref 1.4–6.5)
NEUTROPHILS # BLD AUTO: 6.3 K/UL — SIGNIFICANT CHANGE UP (ref 1.4–6.5)
NEUTROPHILS # BLD AUTO: 6.35 K/UL — SIGNIFICANT CHANGE UP (ref 1.4–6.5)
NEUTROPHILS # BLD AUTO: 6.37 K/UL — SIGNIFICANT CHANGE UP (ref 1.4–6.5)
NEUTROPHILS # BLD AUTO: 6.38 K/UL — SIGNIFICANT CHANGE UP (ref 1.4–6.5)
NEUTROPHILS # BLD AUTO: 6.44 K/UL — SIGNIFICANT CHANGE UP (ref 1.4–6.5)
NEUTROPHILS # BLD AUTO: 6.52 K/UL — HIGH (ref 1.4–6.5)
NEUTROPHILS # BLD AUTO: 6.52 K/UL — HIGH (ref 1.4–6.5)
NEUTROPHILS # BLD AUTO: 6.97 K/UL — HIGH (ref 1.4–6.5)
NEUTROPHILS # BLD AUTO: 7.1 K/UL — HIGH (ref 1.4–6.5)
NEUTROPHILS # BLD AUTO: 7.4 K/UL — HIGH (ref 1.4–6.5)
NEUTROPHILS # BLD AUTO: 7.5 K/UL — HIGH (ref 1.4–6.5)
NEUTROPHILS # BLD AUTO: 7.76 K/UL — HIGH (ref 1.4–6.5)
NEUTROPHILS # BLD AUTO: 7.82 K/UL — HIGH (ref 1.4–6.5)
NEUTROPHILS # BLD AUTO: 7.82 K/UL — HIGH (ref 1.4–6.5)
NEUTROPHILS # BLD AUTO: 8.08 K/UL — HIGH (ref 1.4–6.5)
NEUTROPHILS # BLD AUTO: 8.26 K/UL — HIGH (ref 1.4–6.5)
NEUTROPHILS # BLD AUTO: 8.98 K/UL — HIGH (ref 1.4–6.5)
NEUTROPHILS # BLD AUTO: 8.99 K/UL — HIGH (ref 1.4–6.5)
NEUTROPHILS # BLD AUTO: 8.99 K/UL — HIGH (ref 1.4–6.5)
NEUTROPHILS # BLD AUTO: 9.51 K/UL — HIGH (ref 1.4–6.5)
NEUTROPHILS # BLD AUTO: 9.62 K/UL — HIGH (ref 1.4–6.5)
NEUTROPHILS # BLD AUTO: 9.79 K/UL — HIGH (ref 1.4–6.5)
NEUTROPHILS NFR BLD AUTO: 65.6 % — SIGNIFICANT CHANGE UP (ref 42.2–75.2)
NEUTROPHILS NFR BLD AUTO: 67.3 % — SIGNIFICANT CHANGE UP (ref 42.2–75.2)
NEUTROPHILS NFR BLD AUTO: 68.4 % — SIGNIFICANT CHANGE UP (ref 42.2–75.2)
NEUTROPHILS NFR BLD AUTO: 69.1 % — SIGNIFICANT CHANGE UP (ref 42.2–75.2)
NEUTROPHILS NFR BLD AUTO: 69.5 % — SIGNIFICANT CHANGE UP (ref 42.2–75.2)
NEUTROPHILS NFR BLD AUTO: 70.8 % — SIGNIFICANT CHANGE UP (ref 42.2–75.2)
NEUTROPHILS NFR BLD AUTO: 70.8 % — SIGNIFICANT CHANGE UP (ref 42.2–75.2)
NEUTROPHILS NFR BLD AUTO: 71.4 % — SIGNIFICANT CHANGE UP (ref 42.2–75.2)
NEUTROPHILS NFR BLD AUTO: 72.8 % — SIGNIFICANT CHANGE UP (ref 42.2–75.2)
NEUTROPHILS NFR BLD AUTO: 73.2 % — SIGNIFICANT CHANGE UP (ref 42.2–75.2)
NEUTROPHILS NFR BLD AUTO: 73.8 % — SIGNIFICANT CHANGE UP (ref 42.2–75.2)
NEUTROPHILS NFR BLD AUTO: 73.9 % — SIGNIFICANT CHANGE UP (ref 42.2–75.2)
NEUTROPHILS NFR BLD AUTO: 74.2 % — SIGNIFICANT CHANGE UP (ref 42.2–75.2)
NEUTROPHILS NFR BLD AUTO: 74.2 % — SIGNIFICANT CHANGE UP (ref 42.2–75.2)
NEUTROPHILS NFR BLD AUTO: 74.6 % — SIGNIFICANT CHANGE UP (ref 42.2–75.2)
NEUTROPHILS NFR BLD AUTO: 74.9 % — SIGNIFICANT CHANGE UP (ref 42.2–75.2)
NEUTROPHILS NFR BLD AUTO: 75.5 % — HIGH (ref 42.2–75.2)
NEUTROPHILS NFR BLD AUTO: 75.7 % — HIGH (ref 42.2–75.2)
NEUTROPHILS NFR BLD AUTO: 76.3 % — HIGH (ref 42.2–75.2)
NEUTROPHILS NFR BLD AUTO: 76.6 % — HIGH (ref 42.2–75.2)
NEUTROPHILS NFR BLD AUTO: 76.9 % — HIGH (ref 42.2–75.2)
NEUTROPHILS NFR BLD AUTO: 78.3 % — HIGH (ref 42.2–75.2)
NEUTROPHILS NFR BLD AUTO: 78.4 % — HIGH (ref 42.2–75.2)
NEUTROPHILS NFR BLD AUTO: 78.9 % — HIGH (ref 42.2–75.2)
NEUTROPHILS NFR BLD AUTO: 78.9 % — HIGH (ref 42.2–75.2)
NEUTROPHILS NFR BLD AUTO: 79.6 % — HIGH (ref 42.2–75.2)
NEUTROPHILS NFR BLD AUTO: 80 % — HIGH (ref 42.2–75.2)
NEUTROPHILS NFR BLD AUTO: 80.7 % — HIGH (ref 42.2–75.2)
NEUTROPHILS NFR BLD AUTO: 81.3 % — HIGH (ref 42.2–75.2)
NEUTROPHILS NFR BLD AUTO: 82.2 % — HIGH (ref 42.2–75.2)
NEUTROPHILS NFR BLD AUTO: 82.6 % — HIGH (ref 42.2–75.2)
NEUTROPHILS NFR BLD AUTO: 83.3 % — HIGH (ref 42.2–75.2)
NEUTROPHILS NFR BLD AUTO: 83.3 % — HIGH (ref 42.2–75.2)
NEUTROPHILS NFR BLD AUTO: 83.9 % — HIGH (ref 42.2–75.2)
NEUTROPHILS NFR BLD AUTO: 85.1 % — HIGH (ref 42.2–75.2)
NEUTROPHILS NFR BLD AUTO: 85.1 % — HIGH (ref 42.2–75.2)
NEUTROPHILS NFR BLD AUTO: 85.4 % — HIGH (ref 42.2–75.2)
NEUTROPHILS NFR BLD AUTO: 85.6 % — HIGH (ref 42.2–75.2)
NEUTROPHILS NFR BLD AUTO: 85.7 % — HIGH (ref 42.2–75.2)
NEUTROPHILS NFR BLD AUTO: 86.2 % — HIGH (ref 42.2–75.2)
NEUTROPHILS NFR BLD AUTO: 86.5 % — HIGH (ref 42.2–75.2)
NEUTROPHILS NFR BLD AUTO: 86.5 % — HIGH (ref 42.2–75.2)
NEUTROPHILS NFR BLD AUTO: 88.5 % — HIGH (ref 42.2–75.2)
NEUTROPHILS NFR BLD AUTO: 93 % — HIGH (ref 42.2–75.2)
NEUTROPHILS NFR BLD AUTO: 93 % — HIGH (ref 42.2–75.2)
NEUTROPHILS NFR BLD AUTO: 95.6 % — HIGH (ref 42.2–75.2)
NEUTS BAND # BLD: 1.8 % — SIGNIFICANT CHANGE UP (ref 0–6)
NICOTINAMIDE: 34 NG/ML — SIGNIFICANT CHANGE UP (ref 5.2–72.1)
NICOTINIC ACID: <5 NG/ML — SIGNIFICANT CHANGE UP (ref 0–5)
NITRITE UR-MCNC: NEGATIVE — SIGNIFICANT CHANGE UP
NITRITE UR-MCNC: POSITIVE
NON HDL CHOLESTEROL: 48 MG/DL — SIGNIFICANT CHANGE UP
NON HDL CHOLESTEROL: 93 MG/DL — SIGNIFICANT CHANGE UP
NRBC # BLD: 0 /100 WBCS — SIGNIFICANT CHANGE UP (ref 0–0)
NRBC # BLD: 1 /100 WBCS — HIGH (ref 0–0)
NRBC # BLD: 1 /100 — HIGH (ref 0–0)
NRBC # BLD: 2 /100 WBCS — HIGH (ref 0–0)
NRBC # BLD: 3 /100 WBCS — HIGH (ref 0–0)
NRBC # BLD: 4 /100 — HIGH (ref 0–0)
NRBC # BLD: SIGNIFICANT CHANGE UP /100 WBCS (ref 0–0)
NT-PROBNP SERPL-MCNC: 7248 PG/ML
NT-PROBNP SERPL-SCNC: 3697 PG/ML — HIGH (ref 0–300)
NT-PROBNP SERPL-SCNC: 6465 PG/ML — HIGH (ref 0–300)
NT-PROBNP SERPL-SCNC: 6828 PG/ML — HIGH (ref 0–300)
NT-PROBNP SERPL-SCNC: 9023 PG/ML — HIGH (ref 0–300)
NT-PROBNP SERPL-SCNC: 9132 PG/ML — HIGH (ref 0–300)
NT-PROBNP SERPL-SCNC: 9402 PG/ML — HIGH (ref 0–300)
NT-PROBNP SERPL-SCNC: HIGH PG/ML (ref 0–300)
NT-PROBNP SERPL-SCNC: HIGH PG/ML (ref 0–300)
O2 CT VFR BLD CALC: 41 MMHG — SIGNIFICANT CHANGE UP
O2 CT VFR BLD CALC: 45 MMHG — SIGNIFICANT CHANGE UP
O2 CT VFR BLD CALC: 46 MMHG — SIGNIFICANT CHANGE UP
O2 CT VFR BLD CALC: 51 MMHG — SIGNIFICANT CHANGE UP
O2 CT VFR BLD CALC: 54 MMHG — SIGNIFICANT CHANGE UP
O2 CT VFR BLD CALC: 54 MMHG — SIGNIFICANT CHANGE UP
O2 CT VFR BLD CALC: 56 MMHG — SIGNIFICANT CHANGE UP
O2 CT VFR BLD CALC: 57 MMHG — SIGNIFICANT CHANGE UP
ORGANISM # SPEC MICROSCOPIC CNT: ABNORMAL
ORGANISM # SPEC MICROSCOPIC CNT: SIGNIFICANT CHANGE UP
OSMOLALITY SERPL: 307 MOSMOL/KG — HIGH (ref 280–301)
OSMOLALITY SERPL: 307 MOSMOL/KG — HIGH (ref 280–301)
OSMOLALITY UR: 359 MOS/KG — SIGNIFICANT CHANGE UP (ref 50–1200)
OVALOCYTES BLD QL SMEAR: SLIGHT — SIGNIFICANT CHANGE UP
OVALOCYTES BLD QL SMEAR: SLIGHT — SIGNIFICANT CHANGE UP
PCO2 BLDA: 37 MMHG — SIGNIFICANT CHANGE UP (ref 35–48)
PCO2 BLDA: 37 MMHG — SIGNIFICANT CHANGE UP (ref 35–48)
PCO2 BLDA: 40 MMHG — SIGNIFICANT CHANGE UP (ref 35–48)
PCO2 BLDA: 40 MMHG — SIGNIFICANT CHANGE UP (ref 35–48)
PCO2 BLDA: 52 MMHG — HIGH (ref 35–48)
PCO2 BLDA: 54 MMHG — HIGH (ref 35–48)
PCO2 BLDMV: 29 MMHG — SIGNIFICANT CHANGE UP
PCO2 BLDMV: 46 MMHG — SIGNIFICANT CHANGE UP
PCO2 BLDMV: 49 MMHG — SIGNIFICANT CHANGE UP
PCO2 BLDMV: 52 MMHG — SIGNIFICANT CHANGE UP
PCO2 BLDMV: 53 MMHG — SIGNIFICANT CHANGE UP
PCO2 BLDMV: 54 MMHG — SIGNIFICANT CHANGE UP
PCO2 BLDMV: 60 MMHG — SIGNIFICANT CHANGE UP
PCO2 BLDMV: 60 MMHG — SIGNIFICANT CHANGE UP
PCO2 BLDV: 53 MMHG — SIGNIFICANT CHANGE UP (ref 42–55)
PCO2 BLDV: 65 MMHG — HIGH (ref 42–55)
PCO2 BLDV: 69 MMHG — HIGH (ref 42–55)
PH BLDA: 6.94 — CRITICAL LOW (ref 7.35–7.45)
PH BLDA: 6.94 — CRITICAL LOW (ref 7.35–7.45)
PH BLDA: 7.33 — LOW (ref 7.35–7.45)
PH BLDA: 7.45 — SIGNIFICANT CHANGE UP (ref 7.35–7.45)
PH BLDA: 7.45 — SIGNIFICANT CHANGE UP (ref 7.35–7.45)
PH BLDA: 7.48 — HIGH (ref 7.35–7.45)
PH BLDMV: 7.29 — SIGNIFICANT CHANGE UP
PH BLDMV: 7.33 — SIGNIFICANT CHANGE UP
PH BLDMV: 7.34 — SIGNIFICANT CHANGE UP
PH BLDMV: 7.36 — SIGNIFICANT CHANGE UP
PH BLDMV: 7.36 — SIGNIFICANT CHANGE UP
PH BLDMV: 7.42 — SIGNIFICANT CHANGE UP
PH BLDMV: 7.45 — SIGNIFICANT CHANGE UP
PH BLDMV: 7.5 — SIGNIFICANT CHANGE UP
PH BLDV: 7.21 — LOW (ref 7.32–7.43)
PH BLDV: 7.36 — SIGNIFICANT CHANGE UP (ref 7.32–7.43)
PH BLDV: 7.41 — SIGNIFICANT CHANGE UP (ref 7.32–7.43)
PH UR: 5.5 — SIGNIFICANT CHANGE UP (ref 5–8)
PH UR: 6 — SIGNIFICANT CHANGE UP (ref 5–8)
PH UR: 6.5 — SIGNIFICANT CHANGE UP (ref 5–8)
PHOSPHATE SERPL-MCNC: 2.4 MG/DL — SIGNIFICANT CHANGE UP (ref 2.1–4.9)
PHOSPHATE SERPL-MCNC: 2.7 MG/DL — SIGNIFICANT CHANGE UP (ref 2.1–4.9)
PHOSPHATE SERPL-MCNC: 2.9 MG/DL — SIGNIFICANT CHANGE UP (ref 2.1–4.9)
PHOSPHATE SERPL-MCNC: 3 MG/DL — SIGNIFICANT CHANGE UP (ref 2.1–4.9)
PHOSPHATE SERPL-MCNC: 3.1 MG/DL — SIGNIFICANT CHANGE UP (ref 2.1–4.9)
PHOSPHATE SERPL-MCNC: 3.3 MG/DL — SIGNIFICANT CHANGE UP (ref 2.1–4.9)
PHOSPHATE SERPL-MCNC: 3.4 MG/DL — SIGNIFICANT CHANGE UP (ref 2.1–4.9)
PHOSPHATE SERPL-MCNC: 3.6 MG/DL — SIGNIFICANT CHANGE UP (ref 2.1–4.9)
PHOSPHATE SERPL-MCNC: 4.5 MG/DL — SIGNIFICANT CHANGE UP (ref 2.1–4.9)
PHOSPHATE SERPL-MCNC: 5.8 MG/DL — HIGH (ref 2.1–4.9)
PHOSPHATE SERPL-MCNC: 9.4 MG/DL — HIGH (ref 2.1–4.9)
PHOSPHATE SERPL-MCNC: 9.4 MG/DL — HIGH (ref 2.1–4.9)
PLAT MORPH BLD: NORMAL — SIGNIFICANT CHANGE UP
PLATELET # BLD AUTO: 101 K/UL — LOW (ref 130–400)
PLATELET # BLD AUTO: 105 K/UL — LOW (ref 130–400)
PLATELET # BLD AUTO: 116 K/UL — LOW (ref 130–400)
PLATELET # BLD AUTO: 116 K/UL — LOW (ref 130–400)
PLATELET # BLD AUTO: 120 K/UL — LOW (ref 130–400)
PLATELET # BLD AUTO: 122 K/UL — LOW (ref 130–400)
PLATELET # BLD AUTO: 122 K/UL — LOW (ref 130–400)
PLATELET # BLD AUTO: 132 K/UL — SIGNIFICANT CHANGE UP (ref 130–400)
PLATELET # BLD AUTO: 132 K/UL — SIGNIFICANT CHANGE UP (ref 130–400)
PLATELET # BLD AUTO: 151 K/UL — SIGNIFICANT CHANGE UP (ref 130–400)
PLATELET # BLD AUTO: 161 K/UL — SIGNIFICANT CHANGE UP (ref 130–400)
PLATELET # BLD AUTO: 161 K/UL — SIGNIFICANT CHANGE UP (ref 130–400)
PLATELET # BLD AUTO: 179 K/UL — SIGNIFICANT CHANGE UP (ref 130–400)
PLATELET # BLD AUTO: 183 K/UL — SIGNIFICANT CHANGE UP (ref 130–400)
PLATELET # BLD AUTO: 185 K/UL — SIGNIFICANT CHANGE UP (ref 130–400)
PLATELET # BLD AUTO: 187 K/UL — SIGNIFICANT CHANGE UP (ref 130–400)
PLATELET # BLD AUTO: 202 K/UL — SIGNIFICANT CHANGE UP (ref 130–400)
PLATELET # BLD AUTO: 208 K/UL — SIGNIFICANT CHANGE UP (ref 130–400)
PLATELET # BLD AUTO: 211 K/UL — SIGNIFICANT CHANGE UP (ref 130–400)
PLATELET # BLD AUTO: 234 K/UL — SIGNIFICANT CHANGE UP (ref 130–400)
PLATELET # BLD AUTO: 235 K/UL — SIGNIFICANT CHANGE UP (ref 130–400)
PLATELET # BLD AUTO: 235 K/UL — SIGNIFICANT CHANGE UP (ref 130–400)
PLATELET # BLD AUTO: 239 K/UL — SIGNIFICANT CHANGE UP (ref 130–400)
PLATELET # BLD AUTO: 243 K/UL — SIGNIFICANT CHANGE UP (ref 130–400)
PLATELET # BLD AUTO: 249 K/UL — SIGNIFICANT CHANGE UP (ref 130–400)
PLATELET # BLD AUTO: 251 K/UL — SIGNIFICANT CHANGE UP (ref 130–400)
PLATELET # BLD AUTO: 254 K/UL — SIGNIFICANT CHANGE UP (ref 130–400)
PLATELET # BLD AUTO: 283 K/UL — SIGNIFICANT CHANGE UP (ref 130–400)
PLATELET # BLD AUTO: 286 K/UL — SIGNIFICANT CHANGE UP (ref 130–400)
PLATELET # BLD AUTO: 294 K/UL — SIGNIFICANT CHANGE UP (ref 130–400)
PLATELET # BLD AUTO: 296 K/UL — SIGNIFICANT CHANGE UP (ref 130–400)
PLATELET # BLD AUTO: 299 K/UL — SIGNIFICANT CHANGE UP (ref 130–400)
PLATELET # BLD AUTO: 315 K/UL — SIGNIFICANT CHANGE UP (ref 130–400)
PLATELET # BLD AUTO: 315 K/UL — SIGNIFICANT CHANGE UP (ref 130–400)
PLATELET # BLD AUTO: 317 K/UL — SIGNIFICANT CHANGE UP (ref 130–400)
PLATELET # BLD AUTO: 323 K/UL — SIGNIFICANT CHANGE UP (ref 130–400)
PLATELET # BLD AUTO: 347 K/UL — SIGNIFICANT CHANGE UP (ref 130–400)
PLATELET # BLD AUTO: 349 K/UL — SIGNIFICANT CHANGE UP (ref 130–400)
PLATELET # BLD AUTO: 350 K/UL — SIGNIFICANT CHANGE UP (ref 130–400)
PLATELET # BLD AUTO: 350 K/UL — SIGNIFICANT CHANGE UP (ref 130–400)
PLATELET # BLD AUTO: 353 K/UL — SIGNIFICANT CHANGE UP (ref 130–400)
PLATELET # BLD AUTO: 355 K/UL — SIGNIFICANT CHANGE UP (ref 130–400)
PLATELET # BLD AUTO: 356 K/UL — SIGNIFICANT CHANGE UP (ref 130–400)
PLATELET # BLD AUTO: 358 K/UL — SIGNIFICANT CHANGE UP (ref 130–400)
PLATELET # BLD AUTO: 363 K/UL — SIGNIFICANT CHANGE UP (ref 130–400)
PLATELET # BLD AUTO: 366 K/UL — SIGNIFICANT CHANGE UP (ref 130–400)
PLATELET # BLD AUTO: 367 K/UL — SIGNIFICANT CHANGE UP (ref 130–400)
PLATELET # BLD AUTO: 372 K/UL — SIGNIFICANT CHANGE UP (ref 130–400)
PLATELET # BLD AUTO: 375 K/UL — SIGNIFICANT CHANGE UP (ref 130–400)
PLATELET # BLD AUTO: 377 K/UL — SIGNIFICANT CHANGE UP (ref 130–400)
PLATELET # BLD AUTO: 377 K/UL — SIGNIFICANT CHANGE UP (ref 130–400)
PLATELET # BLD AUTO: 379 K/UL — SIGNIFICANT CHANGE UP (ref 130–400)
PLATELET # BLD AUTO: 379 K/UL — SIGNIFICANT CHANGE UP (ref 130–400)
PLATELET # BLD AUTO: 380 K/UL — SIGNIFICANT CHANGE UP (ref 130–400)
PLATELET # BLD AUTO: 381 K/UL — SIGNIFICANT CHANGE UP (ref 130–400)
PLATELET # BLD AUTO: 390 K/UL — SIGNIFICANT CHANGE UP (ref 130–400)
PLATELET # BLD AUTO: 404 K/UL — HIGH (ref 130–400)
PLATELET # BLD AUTO: 407 K/UL — HIGH (ref 130–400)
PLATELET # BLD AUTO: 407 K/UL — HIGH (ref 130–400)
PLATELET # BLD AUTO: 421 K/UL — HIGH (ref 130–400)
PLATELET CLUMP BLD QL SMEAR: SLIGHT
PLATELET CLUMP BLD QL SMEAR: SLIGHT
PM/SCL-100 AB SER LINE BLOT-ACNC: <20 UNITS — SIGNIFICANT CHANGE UP
PMV BLD: 10 FL — SIGNIFICANT CHANGE UP (ref 7.4–10.4)
PMV BLD: 10 FL — SIGNIFICANT CHANGE UP (ref 7.4–10.4)
PMV BLD: 10.1 FL — SIGNIFICANT CHANGE UP (ref 7.4–10.4)
PMV BLD: 10.1 FL — SIGNIFICANT CHANGE UP (ref 7.4–10.4)
PMV BLD: 10.3 FL — SIGNIFICANT CHANGE UP (ref 7.4–10.4)
PMV BLD: 10.4 FL — SIGNIFICANT CHANGE UP (ref 7.4–10.4)
PMV BLD: 10.4 FL — SIGNIFICANT CHANGE UP (ref 7.4–10.4)
PMV BLD: 11.9 FL — HIGH (ref 7.4–10.4)
PMV BLD: 11.9 FL — HIGH (ref 7.4–10.4)
PMV BLD: 12.5 FL — HIGH (ref 7.4–10.4)
PMV BLD: 9.6 FL — SIGNIFICANT CHANGE UP (ref 7.4–10.4)
PMV BLD: 9.7 FL — SIGNIFICANT CHANGE UP (ref 7.4–10.4)
PMV BLD: 9.7 FL — SIGNIFICANT CHANGE UP (ref 7.4–10.4)
PMV BLD: 9.8 FL — SIGNIFICANT CHANGE UP (ref 7.4–10.4)
PMV BLD: 9.8 FL — SIGNIFICANT CHANGE UP (ref 7.4–10.4)
PMV BLD: 9.9 FL — SIGNIFICANT CHANGE UP (ref 7.4–10.4)
PMV BLD: SIGNIFICANT CHANGE UP (ref 7.4–10.4)
PO2 BLDA: 124 MMHG — HIGH (ref 83–108)
PO2 BLDA: 124 MMHG — HIGH (ref 83–108)
PO2 BLDA: 137 MMHG — HIGH (ref 83–108)
PO2 BLDA: 178 MMHG — HIGH (ref 83–108)
PO2 BLDA: 44 MMHG — CRITICAL LOW (ref 83–108)
PO2 BLDA: 63 MMHG — LOW (ref 83–108)
PO2 BLDV: 18 MMHG — SIGNIFICANT CHANGE UP
PO2 BLDV: 20 MMHG — SIGNIFICANT CHANGE UP
PO2 BLDV: 34 MMHG — SIGNIFICANT CHANGE UP
POIKILOCYTOSIS BLD QL AUTO: SIGNIFICANT CHANGE UP
POIKILOCYTOSIS BLD QL AUTO: SIGNIFICANT CHANGE UP
POIKILOCYTOSIS BLD QL AUTO: SLIGHT — SIGNIFICANT CHANGE UP
POLYCHROMASIA BLD QL SMEAR: SIGNIFICANT CHANGE UP
POLYCHROMASIA BLD QL SMEAR: SLIGHT — SIGNIFICANT CHANGE UP
POTASSIUM BLDV-SCNC: 4 MMOL/L — SIGNIFICANT CHANGE UP (ref 3.5–5.1)
POTASSIUM BLDV-SCNC: 4.7 MMOL/L — SIGNIFICANT CHANGE UP (ref 3.5–5.1)
POTASSIUM SERPL-MCNC: 3.3 MMOL/L — LOW (ref 3.5–5)
POTASSIUM SERPL-MCNC: 3.5 MMOL/L — SIGNIFICANT CHANGE UP (ref 3.5–5)
POTASSIUM SERPL-MCNC: 3.5 MMOL/L — SIGNIFICANT CHANGE UP (ref 3.5–5)
POTASSIUM SERPL-MCNC: 3.6 MMOL/L — SIGNIFICANT CHANGE UP (ref 3.5–5)
POTASSIUM SERPL-MCNC: 3.8 MMOL/L — SIGNIFICANT CHANGE UP (ref 3.5–5)
POTASSIUM SERPL-MCNC: 3.9 MMOL/L — SIGNIFICANT CHANGE UP (ref 3.5–5)
POTASSIUM SERPL-MCNC: 4 MMOL/L — SIGNIFICANT CHANGE UP (ref 3.5–5)
POTASSIUM SERPL-MCNC: 4 MMOL/L — SIGNIFICANT CHANGE UP (ref 3.5–5)
POTASSIUM SERPL-MCNC: 4.1 MMOL/L — SIGNIFICANT CHANGE UP (ref 3.5–5)
POTASSIUM SERPL-MCNC: 4.2 MMOL/L — SIGNIFICANT CHANGE UP (ref 3.5–5)
POTASSIUM SERPL-MCNC: 4.2 MMOL/L — SIGNIFICANT CHANGE UP (ref 3.5–5)
POTASSIUM SERPL-MCNC: 4.3 MMOL/L — SIGNIFICANT CHANGE UP (ref 3.5–5)
POTASSIUM SERPL-MCNC: 4.4 MMOL/L — SIGNIFICANT CHANGE UP (ref 3.5–5)
POTASSIUM SERPL-MCNC: 4.5 MMOL/L — SIGNIFICANT CHANGE UP (ref 3.5–5)
POTASSIUM SERPL-MCNC: 4.6 MMOL/L — SIGNIFICANT CHANGE UP (ref 3.5–5)
POTASSIUM SERPL-MCNC: 4.7 MMOL/L — SIGNIFICANT CHANGE UP (ref 3.5–5)
POTASSIUM SERPL-MCNC: 4.8 MMOL/L — SIGNIFICANT CHANGE UP (ref 3.5–5)
POTASSIUM SERPL-MCNC: 4.9 MMOL/L — SIGNIFICANT CHANGE UP (ref 3.5–5)
POTASSIUM SERPL-MCNC: 4.9 MMOL/L — SIGNIFICANT CHANGE UP (ref 3.5–5)
POTASSIUM SERPL-MCNC: 5 MMOL/L — SIGNIFICANT CHANGE UP (ref 3.5–5)
POTASSIUM SERPL-MCNC: 5 MMOL/L — SIGNIFICANT CHANGE UP (ref 3.5–5)
POTASSIUM SERPL-MCNC: 5.1 MMOL/L — HIGH (ref 3.5–5)
POTASSIUM SERPL-MCNC: 5.2 MMOL/L — HIGH (ref 3.5–5)
POTASSIUM SERPL-MCNC: 5.4 MMOL/L — HIGH (ref 3.5–5)
POTASSIUM SERPL-MCNC: 5.5 MMOL/L — HIGH (ref 3.5–5)
POTASSIUM SERPL-MCNC: 5.6 MMOL/L — HIGH (ref 3.5–5)
POTASSIUM SERPL-MCNC: 5.7 MMOL/L — HIGH (ref 3.5–5)
POTASSIUM SERPL-MCNC: 5.8 MMOL/L — HIGH (ref 3.5–5)
POTASSIUM SERPL-MCNC: 5.9 MMOL/L — HIGH (ref 3.5–5)
POTASSIUM SERPL-MCNC: 6.2 MMOL/L — CRITICAL HIGH (ref 3.5–5)
POTASSIUM SERPL-MCNC: 6.9 MMOL/L — CRITICAL HIGH (ref 3.5–5)
POTASSIUM SERPL-MCNC: 7.4 MMOL/L — CRITICAL HIGH (ref 3.5–5)
POTASSIUM SERPL-MCNC: SIGNIFICANT CHANGE UP MMOL/L (ref 3.5–5)
POTASSIUM SERPL-SCNC: 3.3 MMOL/L — LOW (ref 3.5–5)
POTASSIUM SERPL-SCNC: 3.5 MMOL/L — SIGNIFICANT CHANGE UP (ref 3.5–5)
POTASSIUM SERPL-SCNC: 3.5 MMOL/L — SIGNIFICANT CHANGE UP (ref 3.5–5)
POTASSIUM SERPL-SCNC: 3.6 MMOL/L — SIGNIFICANT CHANGE UP (ref 3.5–5)
POTASSIUM SERPL-SCNC: 3.8 MMOL/L — SIGNIFICANT CHANGE UP (ref 3.5–5)
POTASSIUM SERPL-SCNC: 3.9 MMOL/L — SIGNIFICANT CHANGE UP (ref 3.5–5)
POTASSIUM SERPL-SCNC: 4 MMOL/L — SIGNIFICANT CHANGE UP (ref 3.5–5)
POTASSIUM SERPL-SCNC: 4 MMOL/L — SIGNIFICANT CHANGE UP (ref 3.5–5)
POTASSIUM SERPL-SCNC: 4.1 MMOL/L — SIGNIFICANT CHANGE UP (ref 3.5–5)
POTASSIUM SERPL-SCNC: 4.2 MMOL/L — SIGNIFICANT CHANGE UP (ref 3.5–5)
POTASSIUM SERPL-SCNC: 4.2 MMOL/L — SIGNIFICANT CHANGE UP (ref 3.5–5)
POTASSIUM SERPL-SCNC: 4.3 MMOL/L — SIGNIFICANT CHANGE UP (ref 3.5–5)
POTASSIUM SERPL-SCNC: 4.4 MMOL/L — SIGNIFICANT CHANGE UP (ref 3.5–5)
POTASSIUM SERPL-SCNC: 4.5 MMOL/L — SIGNIFICANT CHANGE UP (ref 3.5–5)
POTASSIUM SERPL-SCNC: 4.6 MMOL/L — SIGNIFICANT CHANGE UP (ref 3.5–5)
POTASSIUM SERPL-SCNC: 4.7 MMOL/L — SIGNIFICANT CHANGE UP (ref 3.5–5)
POTASSIUM SERPL-SCNC: 4.8 MMOL/L — SIGNIFICANT CHANGE UP (ref 3.5–5)
POTASSIUM SERPL-SCNC: 4.9 MMOL/L
POTASSIUM SERPL-SCNC: 4.9 MMOL/L — SIGNIFICANT CHANGE UP (ref 3.5–5)
POTASSIUM SERPL-SCNC: 4.9 MMOL/L — SIGNIFICANT CHANGE UP (ref 3.5–5)
POTASSIUM SERPL-SCNC: 5 MMOL/L — SIGNIFICANT CHANGE UP (ref 3.5–5)
POTASSIUM SERPL-SCNC: 5 MMOL/L — SIGNIFICANT CHANGE UP (ref 3.5–5)
POTASSIUM SERPL-SCNC: 5.1 MMOL/L — HIGH (ref 3.5–5)
POTASSIUM SERPL-SCNC: 5.2 MMOL/L — HIGH (ref 3.5–5)
POTASSIUM SERPL-SCNC: 5.4 MMOL/L — HIGH (ref 3.5–5)
POTASSIUM SERPL-SCNC: 5.5 MMOL/L — HIGH (ref 3.5–5)
POTASSIUM SERPL-SCNC: 5.6 MMOL/L — HIGH (ref 3.5–5)
POTASSIUM SERPL-SCNC: 5.7 MMOL/L — HIGH (ref 3.5–5)
POTASSIUM SERPL-SCNC: 5.8 MMOL/L — HIGH (ref 3.5–5)
POTASSIUM SERPL-SCNC: 5.9 MMOL/L — HIGH (ref 3.5–5)
POTASSIUM SERPL-SCNC: 6.2 MMOL/L — CRITICAL HIGH (ref 3.5–5)
POTASSIUM SERPL-SCNC: 6.9 MMOL/L — CRITICAL HIGH (ref 3.5–5)
POTASSIUM SERPL-SCNC: 7.4 MMOL/L — CRITICAL HIGH (ref 3.5–5)
POTASSIUM SERPL-SCNC: SIGNIFICANT CHANGE UP MMOL/L (ref 3.5–5)
POTASSIUM UR-SCNC: 46 MMOL/L — SIGNIFICANT CHANGE UP
PROCALCITONIN SERPL-MCNC: 0.09 NG/ML — SIGNIFICANT CHANGE UP (ref 0.02–0.1)
PROCALCITONIN SERPL-MCNC: 0.56 NG/ML — HIGH (ref 0.02–0.1)
PROCALCITONIN SERPL-MCNC: 3.88 NG/ML — HIGH (ref 0.02–0.1)
PROCALCITONIN SERPL-MCNC: 3.88 NG/ML — HIGH (ref 0.02–0.1)
PROT ?TM UR-MCNC: 146 MG/DL — HIGH (ref 0–12)
PROT ?TM UR-MCNC: 146 MG/DL — HIGH (ref 0–12)
PROT ?TM UR-MCNC: 258 MG/DLG/24H — SIGNIFICANT CHANGE UP
PROT PATTERN 24H UR ELPH-IMP: SIGNIFICANT CHANGE UP
PROT PATTERN SERPL ELPH-IMP: SIGNIFICANT CHANGE UP
PROT SERPL-MCNC: 5.1 G/DL — LOW (ref 6–8)
PROT SERPL-MCNC: 5.1 G/DL — LOW (ref 6–8)
PROT SERPL-MCNC: 5.4 G/DL — LOW (ref 6–8)
PROT SERPL-MCNC: 5.4 G/DL — LOW (ref 6–8)
PROT SERPL-MCNC: 5.5 G/DL — LOW (ref 6–8)
PROT SERPL-MCNC: 5.6 G/DL — LOW (ref 6–8)
PROT SERPL-MCNC: 5.8 G/DL — LOW (ref 6–8)
PROT SERPL-MCNC: 5.9 G/DL — LOW (ref 6–8)
PROT SERPL-MCNC: 6 G/DL — SIGNIFICANT CHANGE UP (ref 6–8)
PROT SERPL-MCNC: 6.1 G/DL — SIGNIFICANT CHANGE UP (ref 6–8)
PROT SERPL-MCNC: 6.2 G/DL — SIGNIFICANT CHANGE UP (ref 6–8)
PROT SERPL-MCNC: 6.3 G/DL — SIGNIFICANT CHANGE UP (ref 6–8)
PROT SERPL-MCNC: 6.4 G/DL — SIGNIFICANT CHANGE UP (ref 6–8)
PROT SERPL-MCNC: 6.5 G/DL — SIGNIFICANT CHANGE UP (ref 6–8)
PROT SERPL-MCNC: 6.6 G/DL — SIGNIFICANT CHANGE UP (ref 6–8)
PROT SERPL-MCNC: 6.6 G/DL — SIGNIFICANT CHANGE UP (ref 6–8)
PROT SERPL-MCNC: 6.7 G/DL — SIGNIFICANT CHANGE UP (ref 6–8)
PROT SERPL-MCNC: 6.8 G/DL — SIGNIFICANT CHANGE UP (ref 6–8)
PROT SERPL-MCNC: 6.8 G/DL — SIGNIFICANT CHANGE UP (ref 6–8)
PROT SERPL-MCNC: 6.9 G/DL — SIGNIFICANT CHANGE UP (ref 6–8)
PROT SERPL-MCNC: 7 G/DL — SIGNIFICANT CHANGE UP (ref 6–8)
PROT SERPL-MCNC: 7.2 G/DL — SIGNIFICANT CHANGE UP (ref 6–8)
PROT SERPL-MCNC: 7.2 G/DL — SIGNIFICANT CHANGE UP (ref 6–8)
PROT SERPL-MCNC: 7.3 G/DL
PROT SERPL-MCNC: 7.3 G/DL — SIGNIFICANT CHANGE UP (ref 6–8)
PROT SERPL-MCNC: 7.3 G/DL — SIGNIFICANT CHANGE UP (ref 6–8)
PROT SERPL-MCNC: 7.4 G/DL — SIGNIFICANT CHANGE UP (ref 6–8)
PROT SERPL-MCNC: 7.4 G/DL — SIGNIFICANT CHANGE UP (ref 6–8)
PROT SERPL-MCNC: SIGNIFICANT CHANGE UP G/DL (ref 6–8.3)
PROT UR-MCNC: 100 MG/DL
PROT UR-MCNC: 100 MG/DL
PROT UR-MCNC: ABNORMAL
PROT/CREAT UR-RTO: 2.3 RATIO — HIGH (ref 0–0.2)
PROTHROM AB SERPL-ACNC: 14.2 SEC — HIGH (ref 9.95–12.87)
PROTHROM AB SERPL-ACNC: 14.2 SEC — HIGH (ref 9.95–12.87)
PROTHROM AB SERPL-ACNC: 15.7 SEC — HIGH (ref 9.95–12.87)
PROTHROM AB SERPL-ACNC: 15.7 SEC — HIGH (ref 9.95–12.87)
PROTHROM AB SERPL-ACNC: 17.5 SEC — HIGH (ref 9.95–12.87)
PROTHROM AB SERPL-ACNC: 17.5 SEC — HIGH (ref 9.95–12.87)
PROTHROM AB SERPL-ACNC: 17.7 SEC — HIGH (ref 9.95–12.87)
PROTHROM AB SERPL-ACNC: 18.4 SEC — HIGH (ref 9.95–12.87)
PROTHROM AB SERPL-ACNC: 22.7 SEC — HIGH (ref 9.95–12.87)
PROTHROM AB SERPL-ACNC: 24.9 SEC — HIGH (ref 9.95–12.87)
PROTHROM AB SERPL-ACNC: 26.8 SEC — HIGH (ref 9.95–12.87)
QUANTIFERON TB PLUS MITOGEN MINUS NIL: 0.03 IU/ML
QUANTIFERON TB PLUS NIL: 0.02 IU/ML
QUANTIFERON TB PLUS TB1 MINUS NIL: 0 IU/ML
QUANTIFERON TB PLUS TB2 MINUS NIL: -0.01 IU/ML
RBC # BLD: 2.74 M/UL — LOW (ref 4.7–6.1)
RBC # BLD: 2.74 M/UL — LOW (ref 4.7–6.1)
RBC # BLD: 2.77 M/UL — LOW (ref 4.7–6.1)
RBC # BLD: 2.77 M/UL — LOW (ref 4.7–6.1)
RBC # BLD: 3.29 M/UL — LOW (ref 4.7–6.1)
RBC # BLD: 3.29 M/UL — LOW (ref 4.7–6.1)
RBC # BLD: 3.43 M/UL — LOW (ref 4.7–6.1)
RBC # BLD: 3.43 M/UL — LOW (ref 4.7–6.1)
RBC # BLD: 3.52 M/UL — LOW (ref 4.7–6.1)
RBC # BLD: 3.52 M/UL — LOW (ref 4.7–6.1)
RBC # BLD: 4.38 M/UL — LOW (ref 4.7–6.1)
RBC # BLD: 4.6 M/UL — LOW (ref 4.7–6.1)
RBC # BLD: 4.67 M/UL — LOW (ref 4.7–6.1)
RBC # BLD: 4.68 M/UL — LOW (ref 4.7–6.1)
RBC # BLD: 4.74 M/UL — SIGNIFICANT CHANGE UP (ref 4.7–6.1)
RBC # BLD: 4.82 M/UL — SIGNIFICANT CHANGE UP (ref 4.7–6.1)
RBC # BLD: 4.83 M/UL — SIGNIFICANT CHANGE UP (ref 4.7–6.1)
RBC # BLD: 4.84 M/UL — SIGNIFICANT CHANGE UP (ref 4.7–6.1)
RBC # BLD: 4.85 M/UL — SIGNIFICANT CHANGE UP (ref 4.7–6.1)
RBC # BLD: 4.85 M/UL — SIGNIFICANT CHANGE UP (ref 4.7–6.1)
RBC # BLD: 4.89 M/UL — SIGNIFICANT CHANGE UP (ref 4.7–6.1)
RBC # BLD: 4.92 M/UL — SIGNIFICANT CHANGE UP (ref 4.7–6.1)
RBC # BLD: 4.93 M/UL — SIGNIFICANT CHANGE UP (ref 4.7–6.1)
RBC # BLD: 4.95 M/UL — SIGNIFICANT CHANGE UP (ref 4.7–6.1)
RBC # BLD: 4.97 M/UL — SIGNIFICANT CHANGE UP (ref 4.7–6.1)
RBC # BLD: 5 M/UL — SIGNIFICANT CHANGE UP (ref 4.7–6.1)
RBC # BLD: 5 M/UL — SIGNIFICANT CHANGE UP (ref 4.7–6.1)
RBC # BLD: 5.03 M/UL — SIGNIFICANT CHANGE UP (ref 4.7–6.1)
RBC # BLD: 5.08 M/UL — SIGNIFICANT CHANGE UP (ref 4.7–6.1)
RBC # BLD: 5.11 M/UL — SIGNIFICANT CHANGE UP (ref 4.7–6.1)
RBC # BLD: 5.14 M/UL — SIGNIFICANT CHANGE UP (ref 4.7–6.1)
RBC # BLD: 5.16 M/UL — SIGNIFICANT CHANGE UP (ref 4.7–6.1)
RBC # BLD: 5.18 M/UL — SIGNIFICANT CHANGE UP (ref 4.7–6.1)
RBC # BLD: 5.22 M/UL — SIGNIFICANT CHANGE UP (ref 4.7–6.1)
RBC # BLD: 5.24 M/UL — SIGNIFICANT CHANGE UP (ref 4.7–6.1)
RBC # BLD: 5.29 M/UL — SIGNIFICANT CHANGE UP (ref 4.7–6.1)
RBC # BLD: 5.36 M/UL — SIGNIFICANT CHANGE UP (ref 4.7–6.1)
RBC # BLD: 5.65 M/UL — SIGNIFICANT CHANGE UP (ref 4.7–6.1)
RBC # BLD: 5.69 M/UL — SIGNIFICANT CHANGE UP (ref 4.7–6.1)
RBC # BLD: 5.74 M/UL — SIGNIFICANT CHANGE UP (ref 4.7–6.1)
RBC # BLD: 5.87 M/UL — SIGNIFICANT CHANGE UP (ref 4.7–6.1)
RBC # BLD: 5.94 M/UL — SIGNIFICANT CHANGE UP (ref 4.7–6.1)
RBC # BLD: 5.99 M/UL — SIGNIFICANT CHANGE UP (ref 4.7–6.1)
RBC # BLD: 6.06 M/UL — SIGNIFICANT CHANGE UP (ref 4.7–6.1)
RBC # BLD: 6.07 M/UL — SIGNIFICANT CHANGE UP (ref 4.7–6.1)
RBC # BLD: 6.12 M/UL — HIGH (ref 4.7–6.1)
RBC # BLD: 6.14 M/UL — HIGH (ref 4.7–6.1)
RBC # BLD: 6.16 M/UL — HIGH (ref 4.7–6.1)
RBC # BLD: 6.17 M/UL — HIGH (ref 4.7–6.1)
RBC # BLD: 6.2 M/UL — HIGH (ref 4.7–6.1)
RBC # BLD: 6.23 M/UL — HIGH (ref 4.7–6.1)
RBC # BLD: 6.27 M/UL — HIGH (ref 4.7–6.1)
RBC # BLD: 6.29 M/UL — HIGH (ref 4.7–6.1)
RBC # BLD: 6.37 M/UL — HIGH (ref 4.7–6.1)
RBC # BLD: 6.43 M/UL — HIGH (ref 4.7–6.1)
RBC # BLD: 6.51 M/UL — HIGH (ref 4.7–6.1)
RBC # BLD: 6.53 M/UL — HIGH (ref 4.7–6.1)
RBC # BLD: 6.6 M/UL — HIGH (ref 4.7–6.1)
RBC # FLD: 20.4 % — HIGH (ref 11.5–14.5)
RBC # FLD: 20.4 % — HIGH (ref 11.5–14.5)
RBC # FLD: 20.6 % — HIGH (ref 11.5–14.5)
RBC # FLD: 20.6 % — HIGH (ref 11.5–14.5)
RBC # FLD: 20.7 % — HIGH (ref 11.5–14.5)
RBC # FLD: 20.7 % — HIGH (ref 11.5–14.5)
RBC # FLD: 21.7 % — HIGH (ref 11.5–14.5)
RBC # FLD: 22 % — HIGH (ref 11.5–14.5)
RBC # FLD: 22.3 % — HIGH (ref 11.5–14.5)
RBC # FLD: 22.6 % — HIGH (ref 11.5–14.5)
RBC # FLD: 22.8 % — HIGH (ref 11.5–14.5)
RBC # FLD: 22.8 % — HIGH (ref 11.5–14.5)
RBC # FLD: 22.9 % — HIGH (ref 11.5–14.5)
RBC # FLD: 23.3 % — HIGH (ref 11.5–14.5)
RBC # FLD: 23.3 % — HIGH (ref 11.5–14.5)
RBC # FLD: 23.6 % — HIGH (ref 11.5–14.5)
RBC # FLD: 23.7 % — HIGH (ref 11.5–14.5)
RBC # FLD: 23.8 % — HIGH (ref 11.5–14.5)
RBC # FLD: 24.1 % — HIGH (ref 11.5–14.5)
RBC # FLD: 24.2 % — HIGH (ref 11.5–14.5)
RBC # FLD: 24.4 % — HIGH (ref 11.5–14.5)
RBC # FLD: 24.6 % — HIGH (ref 11.5–14.5)
RBC # FLD: 24.6 % — HIGH (ref 11.5–14.5)
RBC # FLD: 25 % — HIGH (ref 11.5–14.5)
RBC # FLD: 25.1 % — HIGH (ref 11.5–14.5)
RBC # FLD: 25.1 % — HIGH (ref 11.5–14.5)
RBC # FLD: 25.2 % — HIGH (ref 11.5–14.5)
RBC # FLD: 25.3 % — HIGH (ref 11.5–14.5)
RBC # FLD: 25.3 % — HIGH (ref 11.5–14.5)
RBC # FLD: 25.4 % — HIGH (ref 11.5–14.5)
RBC # FLD: 25.6 % — HIGH (ref 11.5–14.5)
RBC # FLD: 26.4 % — HIGH (ref 11.5–14.5)
RBC # FLD: 27.1 % — HIGH (ref 11.5–14.5)
RBC # FLD: 27.5 % — HIGH (ref 11.5–14.5)
RBC # FLD: 27.6 % — HIGH (ref 11.5–14.5)
RBC # FLD: 28.2 % — HIGH (ref 11.5–14.5)
RBC # FLD: 28.3 % — HIGH (ref 11.5–14.5)
RBC # FLD: 28.5 % — HIGH (ref 11.5–14.5)
RBC # FLD: 28.6 % — HIGH (ref 11.5–14.5)
RBC # FLD: 29.1 % — HIGH (ref 11.5–14.5)
RBC # FLD: 29.5 % — HIGH (ref 11.5–14.5)
RBC # FLD: 30.3 % — HIGH (ref 11.5–14.5)
RBC # FLD: 30.5 % — HIGH (ref 11.5–14.5)
RBC # FLD: 30.9 % — HIGH (ref 11.5–14.5)
RBC # FLD: 31.7 % — HIGH (ref 11.5–14.5)
RBC # FLD: 33.3 % — HIGH (ref 11.5–14.5)
RBC # FLD: 35.1 % — HIGH (ref 11.5–14.5)
RBC # FLD: 35.1 % — HIGH (ref 11.5–14.5)
RBC # FLD: 35.4 % — HIGH (ref 11.5–14.5)
RBC # FLD: 35.5 % — HIGH (ref 11.5–14.5)
RBC BLD AUTO: ABNORMAL
RBC CASTS # UR COMP ASSIST: 1 /HPF — SIGNIFICANT CHANGE UP (ref 0–4)
RBC CASTS # UR COMP ASSIST: 1 /HPF — SIGNIFICANT CHANGE UP (ref 0–4)
RBC CASTS # UR COMP ASSIST: 143 /HPF — HIGH (ref 0–4)
RBC CASTS # UR COMP ASSIST: 2 /HPF — SIGNIFICANT CHANGE UP (ref 0–4)
RBC CASTS # UR COMP ASSIST: 20 /HPF — HIGH (ref 0–4)
RSV RNA NPH QL NAA+NON-PROBE: SIGNIFICANT CHANGE UP
SAO2 % BLDA: 65.2 % — LOW (ref 94–98)
SAO2 % BLDA: 89.5 % — LOW (ref 94–98)
SAO2 % BLDA: 98.9 % — HIGH (ref 94–98)
SAO2 % BLDA: 98.9 % — HIGH (ref 94–98)
SAO2 % BLDA: 99.8 % — HIGH (ref 94–98)
SAO2 % BLDA: 99.8 % — HIGH (ref 94–98)
SAO2 % BLDMV: 66.2 % — SIGNIFICANT CHANGE UP
SAO2 % BLDMV: 71.3 % — SIGNIFICANT CHANGE UP
SAO2 % BLDMV: 79.8 % — SIGNIFICANT CHANGE UP
SAO2 % BLDMV: 80.3 % — SIGNIFICANT CHANGE UP
SAO2 % BLDMV: 81.5 % — SIGNIFICANT CHANGE UP
SAO2 % BLDMV: 82.9 % — SIGNIFICANT CHANGE UP
SAO2 % BLDMV: 85.6 % — SIGNIFICANT CHANGE UP
SAO2 % BLDMV: 87.7 % — SIGNIFICANT CHANGE UP
SAO2 % BLDV: 16 % — SIGNIFICANT CHANGE UP
SAO2 % BLDV: 20.5 % — SIGNIFICANT CHANGE UP
SAO2 % BLDV: 36.9 % — SIGNIFICANT CHANGE UP
SARS-COV-2 RNA SPEC QL NAA+PROBE: SIGNIFICANT CHANGE UP
SCHISTOCYTES BLD QL AUTO: SLIGHT — SIGNIFICANT CHANGE UP
SMUDGE CELLS # BLD: PRESENT — SIGNIFICANT CHANGE UP
SODIUM SERPL-SCNC: 130 MMOL/L — LOW (ref 135–146)
SODIUM SERPL-SCNC: 132 MMOL/L — LOW (ref 135–146)
SODIUM SERPL-SCNC: 133 MMOL/L — LOW (ref 135–146)
SODIUM SERPL-SCNC: 134 MMOL/L — LOW (ref 135–146)
SODIUM SERPL-SCNC: 135 MMOL/L
SODIUM SERPL-SCNC: 135 MMOL/L — SIGNIFICANT CHANGE UP (ref 135–146)
SODIUM SERPL-SCNC: 136 MMOL/L — SIGNIFICANT CHANGE UP (ref 135–146)
SODIUM SERPL-SCNC: 137 MMOL/L — SIGNIFICANT CHANGE UP (ref 135–146)
SODIUM SERPL-SCNC: 138 MMOL/L — SIGNIFICANT CHANGE UP (ref 135–146)
SODIUM SERPL-SCNC: 139 MMOL/L — SIGNIFICANT CHANGE UP (ref 135–146)
SODIUM SERPL-SCNC: 140 MMOL/L — SIGNIFICANT CHANGE UP (ref 135–146)
SODIUM SERPL-SCNC: 141 MMOL/L — SIGNIFICANT CHANGE UP (ref 135–146)
SODIUM SERPL-SCNC: 142 MMOL/L — SIGNIFICANT CHANGE UP (ref 135–146)
SODIUM SERPL-SCNC: 143 MMOL/L — SIGNIFICANT CHANGE UP (ref 135–146)
SODIUM SERPL-SCNC: 144 MMOL/L — SIGNIFICANT CHANGE UP (ref 135–146)
SODIUM SERPL-SCNC: 145 MMOL/L — SIGNIFICANT CHANGE UP (ref 135–146)
SODIUM SERPL-SCNC: 146 MMOL/L — SIGNIFICANT CHANGE UP (ref 135–146)
SODIUM SERPL-SCNC: 147 MMOL/L — HIGH (ref 135–146)
SODIUM SERPL-SCNC: 150 MMOL/L — HIGH (ref 135–146)
SODIUM SERPL-SCNC: 150 MMOL/L — HIGH (ref 135–146)
SODIUM UR-SCNC: <20 MMOL/L — SIGNIFICANT CHANGE UP
SP GR SPEC: 1.02 — SIGNIFICANT CHANGE UP (ref 1.01–1.03)
SP GR SPEC: 1.02 — SIGNIFICANT CHANGE UP (ref 1–1.03)
SP GR SPEC: 1.02 — SIGNIFICANT CHANGE UP (ref 1–1.03)
SP GR SPEC: 1.03 — HIGH (ref 1.01–1.03)
SP GR SPEC: 1.03 — SIGNIFICANT CHANGE UP (ref 1.01–1.03)
SPECIMEN SOURCE: SIGNIFICANT CHANGE UP
SPHEROCYTES BLD QL SMEAR: SLIGHT — SIGNIFICANT CHANGE UP
SPHEROCYTES BLD QL SMEAR: SLIGHT — SIGNIFICANT CHANGE UP
SQUAMOUS # UR AUTO: 1 /HPF — SIGNIFICANT CHANGE UP (ref 0–5)
SQUAMOUS # UR AUTO: 1 /HPF — SIGNIFICANT CHANGE UP (ref 0–5)
T3FREE SERPL-MCNC: 1.68 PG/ML — LOW (ref 2–4.4)
T4 FREE SERPL-MCNC: 1.1 NG/DL — SIGNIFICANT CHANGE UP (ref 0.9–1.8)
T4 FREE SERPL-MCNC: 1.2 NG/DL — SIGNIFICANT CHANGE UP (ref 0.9–1.8)
TARGETS BLD QL SMEAR: SIGNIFICANT CHANGE UP
TARGETS BLD QL SMEAR: SLIGHT — SIGNIFICANT CHANGE UP
TIBC SERPL-MCNC: 248 UG/DL
TIBC SERPL-MCNC: 267 UG/DL
TIBC SERPL-MCNC: 342 UG/DL — SIGNIFICANT CHANGE UP (ref 220–430)
TIBC SERPL-MCNC: 396 UG/DL — SIGNIFICANT CHANGE UP (ref 220–430)
TOXIC GRANULES BLD QL SMEAR: PRESENT — SIGNIFICANT CHANGE UP
TOXIC GRANULES BLD QL SMEAR: PRESENT — SIGNIFICANT CHANGE UP
TRANSFERRIN SERPL-MCNC: 213 MG/DL
TRANSFERRIN SERPL-MCNC: 223 MG/DL
TRIGL SERPL-MCNC: 109 MG/DL — SIGNIFICANT CHANGE UP
TRIGL SERPL-MCNC: 38 MG/DL — SIGNIFICANT CHANGE UP
TROPONIN T SERPL-MCNC: 0.03 NG/ML — CRITICAL HIGH
TROPONIN T SERPL-MCNC: 0.05 NG/ML — CRITICAL HIGH
TROPONIN T SERPL-MCNC: 0.05 NG/ML — CRITICAL HIGH
TROPONIN T SERPL-MCNC: 0.06 NG/ML — CRITICAL HIGH
TROPONIN T SERPL-MCNC: 0.07 NG/ML — CRITICAL HIGH
TROPONIN T SERPL-MCNC: 0.09 NG/ML — CRITICAL HIGH
TROPONIN T SERPL-MCNC: 0.1 NG/ML — CRITICAL HIGH
TROPONIN T SERPL-MCNC: 0.1 NG/ML — CRITICAL HIGH
TROPONIN T SERPL-MCNC: 0.16 NG/ML — CRITICAL HIGH
TROPONIN T SERPL-MCNC: 0.23 NG/ML — CRITICAL HIGH
TROPONIN T SERPL-MCNC: 0.32 NG/ML — CRITICAL HIGH
TROPONIN T SERPL-MCNC: 0.32 NG/ML — CRITICAL HIGH
TROPONIN T SERPL-MCNC: 1.29 NG/ML — CRITICAL HIGH
TROPONIN T SERPL-MCNC: 1.29 NG/ML — CRITICAL HIGH
TSH SERPL-MCNC: 2.15 UIU/ML — SIGNIFICANT CHANGE UP (ref 0.27–4.2)
TSH SERPL-MCNC: 2.15 UIU/ML — SIGNIFICANT CHANGE UP (ref 0.27–4.2)
TSH SERPL-MCNC: 5.61 UIU/ML — HIGH (ref 0.27–4.2)
UIBC SERPL-MCNC: 196 UG/DL
UIBC SERPL-MCNC: 206 UG/DL
UIBC SERPL-MCNC: 315 UG/DL — SIGNIFICANT CHANGE UP (ref 110–370)
UIBC SERPL-MCNC: 383 UG/DL — HIGH (ref 110–370)
UROBILINOGEN FLD QL: 1 MG/DL — SIGNIFICANT CHANGE UP (ref 0.2–1)
UROBILINOGEN FLD QL: 1 MG/DL — SIGNIFICANT CHANGE UP (ref 0.2–1)
UROBILINOGEN FLD QL: ABNORMAL
UROBILINOGEN FLD QL: SIGNIFICANT CHANGE UP
UROBILINOGEN FLD QL: SIGNIFICANT CHANGE UP
UUN UR-MCNC: 350 MG/DL — SIGNIFICANT CHANGE UP
VANCOMYCIN FLD-MCNC: 10.3 UG/ML — HIGH (ref 5–10)
VANCOMYCIN FLD-MCNC: 10.5 UG/ML — HIGH (ref 5–10)
VANCOMYCIN FLD-MCNC: 12.4 UG/ML — HIGH (ref 5–10)
VANCOMYCIN FLD-MCNC: 12.4 UG/ML — HIGH (ref 5–10)
VANCOMYCIN FLD-MCNC: 15.4 UG/ML — HIGH (ref 5–10)
VANCOMYCIN FLD-MCNC: 18 UG/ML — HIGH (ref 5–10)
VANCOMYCIN FLD-MCNC: 28.5 UG/ML — HIGH (ref 5–10)
VANCOMYCIN TROUGH SERPL-MCNC: 38.2 UG/ML — HIGH (ref 5–10)
VARIANT LYMPHS # BLD: 0.9 % — SIGNIFICANT CHANGE UP (ref 0–5)
VARIANT LYMPHS # BLD: 0.9 % — SIGNIFICANT CHANGE UP (ref 0–5)
VARIANT LYMPHS # BLD: 1.8 % — SIGNIFICANT CHANGE UP (ref 0–5)
VIT B12 SERPL-MCNC: 821 PG/ML — SIGNIFICANT CHANGE UP (ref 232–1245)
WBC # BLD: 10.24 K/UL — SIGNIFICANT CHANGE UP (ref 4.8–10.8)
WBC # BLD: 10.53 K/UL — SIGNIFICANT CHANGE UP (ref 4.8–10.8)
WBC # BLD: 10.78 K/UL — SIGNIFICANT CHANGE UP (ref 4.8–10.8)
WBC # BLD: 10.9 K/UL — HIGH (ref 4.8–10.8)
WBC # BLD: 10.92 K/UL — HIGH (ref 4.8–10.8)
WBC # BLD: 11.05 K/UL — HIGH (ref 4.8–10.8)
WBC # BLD: 11.05 K/UL — HIGH (ref 4.8–10.8)
WBC # BLD: 11.14 K/UL — HIGH (ref 4.8–10.8)
WBC # BLD: 11.35 K/UL — HIGH (ref 4.8–10.8)
WBC # BLD: 12.2 K/UL — HIGH (ref 4.8–10.8)
WBC # BLD: 12.21 K/UL — HIGH (ref 4.8–10.8)
WBC # BLD: 27.06 K/UL — HIGH (ref 4.8–10.8)
WBC # BLD: 27.06 K/UL — HIGH (ref 4.8–10.8)
WBC # BLD: 27.91 K/UL — HIGH (ref 4.8–10.8)
WBC # BLD: 27.91 K/UL — HIGH (ref 4.8–10.8)
WBC # BLD: 30.12 K/UL — HIGH (ref 4.8–10.8)
WBC # BLD: 30.12 K/UL — HIGH (ref 4.8–10.8)
WBC # BLD: 44.3 K/UL — CRITICAL HIGH (ref 4.8–10.8)
WBC # BLD: 44.3 K/UL — CRITICAL HIGH (ref 4.8–10.8)
WBC # BLD: 5.86 K/UL — SIGNIFICANT CHANGE UP (ref 4.8–10.8)
WBC # BLD: 6.27 K/UL — SIGNIFICANT CHANGE UP (ref 4.8–10.8)
WBC # BLD: 6.41 K/UL — SIGNIFICANT CHANGE UP (ref 4.8–10.8)
WBC # BLD: 6.43 K/UL — SIGNIFICANT CHANGE UP (ref 4.8–10.8)
WBC # BLD: 6.43 K/UL — SIGNIFICANT CHANGE UP (ref 4.8–10.8)
WBC # BLD: 6.56 K/UL — SIGNIFICANT CHANGE UP (ref 4.8–10.8)
WBC # BLD: 6.74 K/UL — SIGNIFICANT CHANGE UP (ref 4.8–10.8)
WBC # BLD: 6.81 K/UL — SIGNIFICANT CHANGE UP (ref 4.8–10.8)
WBC # BLD: 6.86 K/UL — SIGNIFICANT CHANGE UP (ref 4.8–10.8)
WBC # BLD: 6.91 K/UL — SIGNIFICANT CHANGE UP (ref 4.8–10.8)
WBC # BLD: 7.14 K/UL — SIGNIFICANT CHANGE UP (ref 4.8–10.8)
WBC # BLD: 7.16 K/UL — SIGNIFICANT CHANGE UP (ref 4.8–10.8)
WBC # BLD: 7.16 K/UL — SIGNIFICANT CHANGE UP (ref 4.8–10.8)
WBC # BLD: 7.26 K/UL — SIGNIFICANT CHANGE UP (ref 4.8–10.8)
WBC # BLD: 7.35 K/UL — SIGNIFICANT CHANGE UP (ref 4.8–10.8)
WBC # BLD: 7.39 K/UL — SIGNIFICANT CHANGE UP (ref 4.8–10.8)
WBC # BLD: 7.73 K/UL — SIGNIFICANT CHANGE UP (ref 4.8–10.8)
WBC # BLD: 7.74 K/UL — SIGNIFICANT CHANGE UP (ref 4.8–10.8)
WBC # BLD: 7.88 K/UL — SIGNIFICANT CHANGE UP (ref 4.8–10.8)
WBC # BLD: 7.91 K/UL — SIGNIFICANT CHANGE UP (ref 4.8–10.8)
WBC # BLD: 7.93 K/UL — SIGNIFICANT CHANGE UP (ref 4.8–10.8)
WBC # BLD: 8.05 K/UL — SIGNIFICANT CHANGE UP (ref 4.8–10.8)
WBC # BLD: 8.05 K/UL — SIGNIFICANT CHANGE UP (ref 4.8–10.8)
WBC # BLD: 8.2 K/UL — SIGNIFICANT CHANGE UP (ref 4.8–10.8)
WBC # BLD: 8.36 K/UL — SIGNIFICANT CHANGE UP (ref 4.8–10.8)
WBC # BLD: 8.43 K/UL — SIGNIFICANT CHANGE UP (ref 4.8–10.8)
WBC # BLD: 8.45 K/UL — SIGNIFICANT CHANGE UP (ref 4.8–10.8)
WBC # BLD: 8.47 K/UL — SIGNIFICANT CHANGE UP (ref 4.8–10.8)
WBC # BLD: 8.47 K/UL — SIGNIFICANT CHANGE UP (ref 4.8–10.8)
WBC # BLD: 8.49 K/UL — SIGNIFICANT CHANGE UP (ref 4.8–10.8)
WBC # BLD: 8.56 K/UL — SIGNIFICANT CHANGE UP (ref 4.8–10.8)
WBC # BLD: 8.58 K/UL — SIGNIFICANT CHANGE UP (ref 4.8–10.8)
WBC # BLD: 8.67 K/UL — SIGNIFICANT CHANGE UP (ref 4.8–10.8)
WBC # BLD: 8.8 K/UL — SIGNIFICANT CHANGE UP (ref 4.8–10.8)
WBC # BLD: 8.83 K/UL — SIGNIFICANT CHANGE UP (ref 4.8–10.8)
WBC # BLD: 8.93 K/UL — SIGNIFICANT CHANGE UP (ref 4.8–10.8)
WBC # BLD: 9.21 K/UL — SIGNIFICANT CHANGE UP (ref 4.8–10.8)
WBC # BLD: 9.23 K/UL — SIGNIFICANT CHANGE UP (ref 4.8–10.8)
WBC # BLD: 9.26 K/UL — SIGNIFICANT CHANGE UP (ref 4.8–10.8)
WBC # BLD: 9.28 K/UL — SIGNIFICANT CHANGE UP (ref 4.8–10.8)
WBC # BLD: 9.96 K/UL — SIGNIFICANT CHANGE UP (ref 4.8–10.8)
WBC # FLD AUTO: 10.24 K/UL — SIGNIFICANT CHANGE UP (ref 4.8–10.8)
WBC # FLD AUTO: 10.53 K/UL — SIGNIFICANT CHANGE UP (ref 4.8–10.8)
WBC # FLD AUTO: 10.78 K/UL — SIGNIFICANT CHANGE UP (ref 4.8–10.8)
WBC # FLD AUTO: 10.9 K/UL — HIGH (ref 4.8–10.8)
WBC # FLD AUTO: 10.92 K/UL — HIGH (ref 4.8–10.8)
WBC # FLD AUTO: 11.05 K/UL — HIGH (ref 4.8–10.8)
WBC # FLD AUTO: 11.05 K/UL — HIGH (ref 4.8–10.8)
WBC # FLD AUTO: 11.14 K/UL — HIGH (ref 4.8–10.8)
WBC # FLD AUTO: 11.35 K/UL — HIGH (ref 4.8–10.8)
WBC # FLD AUTO: 12.2 K/UL — HIGH (ref 4.8–10.8)
WBC # FLD AUTO: 12.21 K/UL — HIGH (ref 4.8–10.8)
WBC # FLD AUTO: 27.06 K/UL — HIGH (ref 4.8–10.8)
WBC # FLD AUTO: 27.06 K/UL — HIGH (ref 4.8–10.8)
WBC # FLD AUTO: 27.91 K/UL — HIGH (ref 4.8–10.8)
WBC # FLD AUTO: 27.91 K/UL — HIGH (ref 4.8–10.8)
WBC # FLD AUTO: 30.12 K/UL — HIGH (ref 4.8–10.8)
WBC # FLD AUTO: 30.12 K/UL — HIGH (ref 4.8–10.8)
WBC # FLD AUTO: 44.3 K/UL — CRITICAL HIGH (ref 4.8–10.8)
WBC # FLD AUTO: 44.3 K/UL — CRITICAL HIGH (ref 4.8–10.8)
WBC # FLD AUTO: 5.86 K/UL — SIGNIFICANT CHANGE UP (ref 4.8–10.8)
WBC # FLD AUTO: 6.27 K/UL — SIGNIFICANT CHANGE UP (ref 4.8–10.8)
WBC # FLD AUTO: 6.41 K/UL — SIGNIFICANT CHANGE UP (ref 4.8–10.8)
WBC # FLD AUTO: 6.43 K/UL — SIGNIFICANT CHANGE UP (ref 4.8–10.8)
WBC # FLD AUTO: 6.43 K/UL — SIGNIFICANT CHANGE UP (ref 4.8–10.8)
WBC # FLD AUTO: 6.56 K/UL — SIGNIFICANT CHANGE UP (ref 4.8–10.8)
WBC # FLD AUTO: 6.74 K/UL — SIGNIFICANT CHANGE UP (ref 4.8–10.8)
WBC # FLD AUTO: 6.81 K/UL — SIGNIFICANT CHANGE UP (ref 4.8–10.8)
WBC # FLD AUTO: 6.86 K/UL — SIGNIFICANT CHANGE UP (ref 4.8–10.8)
WBC # FLD AUTO: 6.91 K/UL — SIGNIFICANT CHANGE UP (ref 4.8–10.8)
WBC # FLD AUTO: 7.14 K/UL — SIGNIFICANT CHANGE UP (ref 4.8–10.8)
WBC # FLD AUTO: 7.16 K/UL — SIGNIFICANT CHANGE UP (ref 4.8–10.8)
WBC # FLD AUTO: 7.16 K/UL — SIGNIFICANT CHANGE UP (ref 4.8–10.8)
WBC # FLD AUTO: 7.26 K/UL — SIGNIFICANT CHANGE UP (ref 4.8–10.8)
WBC # FLD AUTO: 7.35 K/UL — SIGNIFICANT CHANGE UP (ref 4.8–10.8)
WBC # FLD AUTO: 7.39 K/UL — SIGNIFICANT CHANGE UP (ref 4.8–10.8)
WBC # FLD AUTO: 7.73 K/UL — SIGNIFICANT CHANGE UP (ref 4.8–10.8)
WBC # FLD AUTO: 7.74 K/UL — SIGNIFICANT CHANGE UP (ref 4.8–10.8)
WBC # FLD AUTO: 7.88 K/UL — SIGNIFICANT CHANGE UP (ref 4.8–10.8)
WBC # FLD AUTO: 7.91 K/UL — SIGNIFICANT CHANGE UP (ref 4.8–10.8)
WBC # FLD AUTO: 7.93 K/UL — SIGNIFICANT CHANGE UP (ref 4.8–10.8)
WBC # FLD AUTO: 8.05 K/UL — SIGNIFICANT CHANGE UP (ref 4.8–10.8)
WBC # FLD AUTO: 8.05 K/UL — SIGNIFICANT CHANGE UP (ref 4.8–10.8)
WBC # FLD AUTO: 8.2 K/UL — SIGNIFICANT CHANGE UP (ref 4.8–10.8)
WBC # FLD AUTO: 8.36 K/UL — SIGNIFICANT CHANGE UP (ref 4.8–10.8)
WBC # FLD AUTO: 8.43 K/UL — SIGNIFICANT CHANGE UP (ref 4.8–10.8)
WBC # FLD AUTO: 8.45 K/UL — SIGNIFICANT CHANGE UP (ref 4.8–10.8)
WBC # FLD AUTO: 8.47 K/UL — SIGNIFICANT CHANGE UP (ref 4.8–10.8)
WBC # FLD AUTO: 8.47 K/UL — SIGNIFICANT CHANGE UP (ref 4.8–10.8)
WBC # FLD AUTO: 8.49 K/UL — SIGNIFICANT CHANGE UP (ref 4.8–10.8)
WBC # FLD AUTO: 8.56 K/UL — SIGNIFICANT CHANGE UP (ref 4.8–10.8)
WBC # FLD AUTO: 8.58 K/UL — SIGNIFICANT CHANGE UP (ref 4.8–10.8)
WBC # FLD AUTO: 8.67 K/UL — SIGNIFICANT CHANGE UP (ref 4.8–10.8)
WBC # FLD AUTO: 8.8 K/UL — SIGNIFICANT CHANGE UP (ref 4.8–10.8)
WBC # FLD AUTO: 8.83 K/UL — SIGNIFICANT CHANGE UP (ref 4.8–10.8)
WBC # FLD AUTO: 8.93 K/UL — SIGNIFICANT CHANGE UP (ref 4.8–10.8)
WBC # FLD AUTO: 9.21 K/UL — SIGNIFICANT CHANGE UP (ref 4.8–10.8)
WBC # FLD AUTO: 9.23 K/UL — SIGNIFICANT CHANGE UP (ref 4.8–10.8)
WBC # FLD AUTO: 9.26 K/UL — SIGNIFICANT CHANGE UP (ref 4.8–10.8)
WBC # FLD AUTO: 9.28 K/UL — SIGNIFICANT CHANGE UP (ref 4.8–10.8)
WBC # FLD AUTO: 9.96 K/UL — SIGNIFICANT CHANGE UP (ref 4.8–10.8)
WBC UR QL: 189 /HPF — HIGH (ref 0–5)
WBC UR QL: 36 /HPF — HIGH (ref 0–5)
WBC UR QL: >720 /HPF — HIGH (ref 0–5)
WBC UR QL: >998 /HPF — HIGH (ref 0–5)
WBC UR QL: >998 /HPF — HIGH (ref 0–5)

## 2023-01-01 PROCEDURE — 71045 X-RAY EXAM CHEST 1 VIEW: CPT | Mod: 26

## 2023-01-01 PROCEDURE — 36415 COLL VENOUS BLD VENIPUNCTURE: CPT

## 2023-01-01 PROCEDURE — 73502 X-RAY EXAM HIP UNI 2-3 VIEWS: CPT | Mod: 26,LT

## 2023-01-01 PROCEDURE — 73610 X-RAY EXAM OF ANKLE: CPT | Mod: 26,LT

## 2023-01-01 PROCEDURE — 93005 ELECTROCARDIOGRAM TRACING: CPT

## 2023-01-01 PROCEDURE — 99221 1ST HOSP IP/OBS SF/LOW 40: CPT | Mod: FS

## 2023-01-01 PROCEDURE — 99214 OFFICE O/P EST MOD 30 MIN: CPT

## 2023-01-01 PROCEDURE — 99497 ADVNCD CARE PLAN 30 MIN: CPT

## 2023-01-01 PROCEDURE — 93930 UPPER EXTREMITY STUDY: CPT

## 2023-01-01 PROCEDURE — 85018 HEMOGLOBIN: CPT

## 2023-01-01 PROCEDURE — 99239 HOSP IP/OBS DSCHRG MGMT >30: CPT

## 2023-01-01 PROCEDURE — 99232 SBSQ HOSP IP/OBS MODERATE 35: CPT

## 2023-01-01 PROCEDURE — 84295 ASSAY OF SERUM SODIUM: CPT

## 2023-01-01 PROCEDURE — 85610 PROTHROMBIN TIME: CPT

## 2023-01-01 PROCEDURE — 82330 ASSAY OF CALCIUM: CPT

## 2023-01-01 PROCEDURE — 71045 X-RAY EXAM CHEST 1 VIEW: CPT | Mod: 26,76

## 2023-01-01 PROCEDURE — 99291 CRITICAL CARE FIRST HOUR: CPT

## 2023-01-01 PROCEDURE — 85014 HEMATOCRIT: CPT

## 2023-01-01 PROCEDURE — 99222 1ST HOSP IP/OBS MODERATE 55: CPT | Mod: GC

## 2023-01-01 PROCEDURE — 83735 ASSAY OF MAGNESIUM: CPT

## 2023-01-01 PROCEDURE — 99213 OFFICE O/P EST LOW 20 MIN: CPT | Mod: 95

## 2023-01-01 PROCEDURE — 95819 EEG AWAKE AND ASLEEP: CPT

## 2023-01-01 PROCEDURE — 83540 ASSAY OF IRON: CPT

## 2023-01-01 PROCEDURE — 73562 X-RAY EXAM OF KNEE 3: CPT | Mod: 26,LT

## 2023-01-01 PROCEDURE — 87040 BLOOD CULTURE FOR BACTERIA: CPT | Mod: 59

## 2023-01-01 PROCEDURE — 99233 SBSQ HOSP IP/OBS HIGH 50: CPT

## 2023-01-01 PROCEDURE — 85027 COMPLETE CBC AUTOMATED: CPT

## 2023-01-01 PROCEDURE — 73630 X-RAY EXAM OF FOOT: CPT | Mod: 26,LT

## 2023-01-01 PROCEDURE — 71260 CT THORAX DX C+: CPT | Mod: 26,MA

## 2023-01-01 PROCEDURE — 83036 HEMOGLOBIN GLYCOSYLATED A1C: CPT

## 2023-01-01 PROCEDURE — 86901 BLOOD TYPING SEROLOGIC RH(D): CPT

## 2023-01-01 PROCEDURE — 93306 TTE W/DOPPLER COMPLETE: CPT | Mod: 26

## 2023-01-01 PROCEDURE — 84133 ASSAY OF URINE POTASSIUM: CPT

## 2023-01-01 PROCEDURE — 93926 LOWER EXTREMITY STUDY: CPT | Mod: LT

## 2023-01-01 PROCEDURE — 76770 US EXAM ABDO BACK WALL COMP: CPT | Mod: 26

## 2023-01-01 PROCEDURE — 93306 TTE W/DOPPLER COMPLETE: CPT

## 2023-01-01 PROCEDURE — 80048 BASIC METABOLIC PNL TOTAL CA: CPT

## 2023-01-01 PROCEDURE — 97163 PT EVAL HIGH COMPLEX 45 MIN: CPT | Mod: GP

## 2023-01-01 PROCEDURE — 99285 EMERGENCY DEPT VISIT HI MDM: CPT

## 2023-01-01 PROCEDURE — 86038 ANTINUCLEAR ANTIBODIES: CPT

## 2023-01-01 PROCEDURE — 93925 LOWER EXTREMITY STUDY: CPT | Mod: 26

## 2023-01-01 PROCEDURE — 95819 EEG AWAKE AND ASLEEP: CPT | Mod: 26

## 2023-01-01 PROCEDURE — 74230 X-RAY XM SWLNG FUNCJ C+: CPT | Mod: 26

## 2023-01-01 PROCEDURE — 94002 VENT MGMT INPAT INIT DAY: CPT

## 2023-01-01 PROCEDURE — 85025 COMPLETE CBC W/AUTO DIFF WBC: CPT

## 2023-01-01 PROCEDURE — 81374 HLA I TYPING 1 ANTIGEN LR: CPT

## 2023-01-01 PROCEDURE — 71045 X-RAY EXAM CHEST 1 VIEW: CPT

## 2023-01-01 PROCEDURE — 87040 BLOOD CULTURE FOR BACTERIA: CPT

## 2023-01-01 PROCEDURE — 82962 GLUCOSE BLOOD TEST: CPT

## 2023-01-01 PROCEDURE — 99223 1ST HOSP IP/OBS HIGH 75: CPT

## 2023-01-01 PROCEDURE — 97530 THERAPEUTIC ACTIVITIES: CPT | Mod: GP

## 2023-01-01 PROCEDURE — 80053 COMPREHEN METABOLIC PANEL: CPT

## 2023-01-01 PROCEDURE — 80202 ASSAY OF VANCOMYCIN: CPT

## 2023-01-01 PROCEDURE — 99285 EMERGENCY DEPT VISIT HI MDM: CPT | Mod: GC

## 2023-01-01 PROCEDURE — 72125 CT NECK SPINE W/O DYE: CPT | Mod: 26,MA

## 2023-01-01 PROCEDURE — 97110 THERAPEUTIC EXERCISES: CPT | Mod: GP

## 2023-01-01 PROCEDURE — 93290 INTERROG DEV EVAL ICPMS IP: CPT

## 2023-01-01 PROCEDURE — 84165 PROTEIN E-PHORESIS SERUM: CPT

## 2023-01-01 PROCEDURE — 99233 SBSQ HOSP IP/OBS HIGH 50: CPT | Mod: 25

## 2023-01-01 PROCEDURE — 82728 ASSAY OF FERRITIN: CPT

## 2023-01-01 PROCEDURE — 99238 HOSP IP/OBS DSCHRG MGMT 30/<: CPT

## 2023-01-01 PROCEDURE — 99222 1ST HOSP IP/OBS MODERATE 55: CPT

## 2023-01-01 PROCEDURE — 95822 EEG COMA OR SLEEP ONLY: CPT | Mod: 26

## 2023-01-01 PROCEDURE — 86900 BLOOD TYPING SEROLOGIC ABO: CPT

## 2023-01-01 PROCEDURE — 73620 X-RAY EXAM OF FOOT: CPT | Mod: LT

## 2023-01-01 PROCEDURE — 93926 LOWER EXTREMITY STUDY: CPT | Mod: 26,LT

## 2023-01-01 PROCEDURE — 82570 ASSAY OF URINE CREATININE: CPT

## 2023-01-01 PROCEDURE — 70450 CT HEAD/BRAIN W/O DYE: CPT

## 2023-01-01 PROCEDURE — 76770 US EXAM ABDO BACK WALL COMP: CPT

## 2023-01-01 PROCEDURE — 74177 CT ABD & PELVIS W/CONTRAST: CPT | Mod: 26,MA

## 2023-01-01 PROCEDURE — 93010 ELECTROCARDIOGRAM REPORT: CPT

## 2023-01-01 PROCEDURE — 86160 COMPLEMENT ANTIGEN: CPT

## 2023-01-01 PROCEDURE — 99495 TRANSJ CARE MGMT MOD F2F 14D: CPT | Mod: 95

## 2023-01-01 PROCEDURE — 82803 BLOOD GASES ANY COMBINATION: CPT

## 2023-01-01 PROCEDURE — 84145 PROCALCITONIN (PCT): CPT

## 2023-01-01 PROCEDURE — 84484 ASSAY OF TROPONIN QUANT: CPT

## 2023-01-01 PROCEDURE — 93000 ELECTROCARDIOGRAM COMPLETE: CPT | Mod: 59

## 2023-01-01 PROCEDURE — 73590 X-RAY EXAM OF LOWER LEG: CPT | Mod: 26,LT

## 2023-01-01 PROCEDURE — 86255 FLUORESCENT ANTIBODY SCREEN: CPT

## 2023-01-01 PROCEDURE — 70450 CT HEAD/BRAIN W/O DYE: CPT | Mod: 26

## 2023-01-01 PROCEDURE — 82977 ASSAY OF GGT: CPT

## 2023-01-01 PROCEDURE — 83605 ASSAY OF LACTIC ACID: CPT

## 2023-01-01 PROCEDURE — 80061 LIPID PANEL: CPT

## 2023-01-01 PROCEDURE — 72170 X-RAY EXAM OF PELVIS: CPT | Mod: 26

## 2023-01-01 PROCEDURE — 87640 STAPH A DNA AMP PROBE: CPT

## 2023-01-01 PROCEDURE — 99497 ADVNCD CARE PLAN 30 MIN: CPT | Mod: 25

## 2023-01-01 PROCEDURE — 82085 ASSAY OF ALDOLASE: CPT

## 2023-01-01 PROCEDURE — 99285 EMERGENCY DEPT VISIT HI MDM: CPT | Mod: 25

## 2023-01-01 PROCEDURE — 85379 FIBRIN DEGRADATION QUANT: CPT

## 2023-01-01 PROCEDURE — 86235 NUCLEAR ANTIGEN ANTIBODY: CPT

## 2023-01-01 PROCEDURE — 86850 RBC ANTIBODY SCREEN: CPT

## 2023-01-01 PROCEDURE — 83935 ASSAY OF URINE OSMOLALITY: CPT

## 2023-01-01 PROCEDURE — 87077 CULTURE AEROBIC IDENTIFY: CPT

## 2023-01-01 PROCEDURE — 85730 THROMBOPLASTIN TIME PARTIAL: CPT

## 2023-01-01 PROCEDURE — 93010 ELECTROCARDIOGRAM REPORT: CPT | Mod: 77

## 2023-01-01 PROCEDURE — 80162 ASSAY OF DIGOXIN TOTAL: CPT

## 2023-01-01 PROCEDURE — 99221 1ST HOSP IP/OBS SF/LOW 40: CPT

## 2023-01-01 PROCEDURE — 99232 SBSQ HOSP IP/OBS MODERATE 35: CPT | Mod: GC

## 2023-01-01 PROCEDURE — 86140 C-REACTIVE PROTEIN: CPT

## 2023-01-01 PROCEDURE — 92610 EVALUATE SWALLOWING FUNCTION: CPT | Mod: GN

## 2023-01-01 PROCEDURE — 83880 ASSAY OF NATRIURETIC PEPTIDE: CPT

## 2023-01-01 PROCEDURE — 99442: CPT | Mod: 95

## 2023-01-01 PROCEDURE — 73620 X-RAY EXAM OF FOOT: CPT | Mod: 26,LT

## 2023-01-01 PROCEDURE — 73552 X-RAY EXAM OF FEMUR 2/>: CPT | Mod: 26,LT

## 2023-01-01 PROCEDURE — 84132 ASSAY OF SERUM POTASSIUM: CPT

## 2023-01-01 PROCEDURE — 84156 ASSAY OF PROTEIN URINE: CPT

## 2023-01-01 PROCEDURE — 81001 URINALYSIS AUTO W/SCOPE: CPT

## 2023-01-01 PROCEDURE — 97162 PT EVAL MOD COMPLEX 30 MIN: CPT | Mod: GP

## 2023-01-01 PROCEDURE — 84100 ASSAY OF PHOSPHORUS: CPT

## 2023-01-01 PROCEDURE — 93971 EXTREMITY STUDY: CPT | Mod: LT

## 2023-01-01 PROCEDURE — 93000 ELECTROCARDIOGRAM COMPLETE: CPT

## 2023-01-01 PROCEDURE — 70450 CT HEAD/BRAIN W/O DYE: CPT | Mod: 26,MA

## 2023-01-01 PROCEDURE — 85652 RBC SED RATE AUTOMATED: CPT

## 2023-01-01 PROCEDURE — 84443 ASSAY THYROID STIM HORMONE: CPT

## 2023-01-01 PROCEDURE — 71045 X-RAY EXAM CHEST 1 VIEW: CPT | Mod: 26,77

## 2023-01-01 PROCEDURE — 92526 ORAL FUNCTION THERAPY: CPT | Mod: GN

## 2023-01-01 PROCEDURE — 99235 HOSP IP/OBS SAME DATE MOD 70: CPT

## 2023-01-01 PROCEDURE — 93925 LOWER EXTREMITY STUDY: CPT

## 2023-01-01 PROCEDURE — 95816 EEG AWAKE AND DROWSY: CPT | Mod: 26

## 2023-01-01 PROCEDURE — 93284 PRGRMG EVAL IMPLANTABLE DFB: CPT

## 2023-01-01 PROCEDURE — 93971 EXTREMITY STUDY: CPT | Mod: 26,LT

## 2023-01-01 PROCEDURE — 84166 PROTEIN E-PHORESIS/URINE/CSF: CPT

## 2023-01-01 PROCEDURE — 87635 SARS-COV-2 COVID-19 AMP PRB: CPT

## 2023-01-01 PROCEDURE — 73130 X-RAY EXAM OF HAND: CPT | Mod: 26,50

## 2023-01-01 PROCEDURE — 87641 MR-STAPH DNA AMP PROBE: CPT

## 2023-01-01 PROCEDURE — 84540 ASSAY OF URINE/UREA-N: CPT

## 2023-01-01 PROCEDURE — 73590 X-RAY EXAM OF LOWER LEG: CPT | Mod: LT

## 2023-01-01 PROCEDURE — 73130 X-RAY EXAM OF HAND: CPT | Mod: 50

## 2023-01-01 PROCEDURE — 73610 X-RAY EXAM OF ANKLE: CPT | Mod: LT

## 2023-01-01 PROCEDURE — 74230 X-RAY XM SWLNG FUNCJ C+: CPT

## 2023-01-01 PROCEDURE — 83550 IRON BINDING TEST: CPT

## 2023-01-01 PROCEDURE — 99204 OFFICE O/P NEW MOD 45 MIN: CPT

## 2023-01-01 PROCEDURE — 99284 EMERGENCY DEPT VISIT MOD MDM: CPT | Mod: FS

## 2023-01-01 PROCEDURE — 87186 SC STD MICRODIL/AGAR DIL: CPT

## 2023-01-01 PROCEDURE — 73562 X-RAY EXAM OF KNEE 3: CPT | Mod: LT

## 2023-01-01 PROCEDURE — 82550 ASSAY OF CK (CPK): CPT

## 2023-01-01 PROCEDURE — 93930 UPPER EXTREMITY STUDY: CPT | Mod: 26

## 2023-01-01 PROCEDURE — 92611 MOTION FLUOROSCOPY/SWALLOW: CPT | Mod: GN

## 2023-01-01 PROCEDURE — 82140 ASSAY OF AMMONIA: CPT

## 2023-01-01 PROCEDURE — 73630 X-RAY EXAM OF FOOT: CPT | Mod: LT

## 2023-01-01 PROCEDURE — 99291 CRITICAL CARE FIRST HOUR: CPT | Mod: GC

## 2023-01-01 PROCEDURE — 93284 PRGRMG EVAL IMPLANTABLE DFB: CPT | Mod: 26

## 2023-01-01 PROCEDURE — 73552 X-RAY EXAM OF FEMUR 2/>: CPT | Mod: LT

## 2023-01-01 PROCEDURE — 73502 X-RAY EXAM HIP UNI 2-3 VIEWS: CPT | Mod: LT

## 2023-01-01 PROCEDURE — C9113: CPT

## 2023-01-01 PROCEDURE — 87086 URINE CULTURE/COLONY COUNT: CPT

## 2023-01-01 PROCEDURE — 84300 ASSAY OF URINE SODIUM: CPT

## 2023-01-01 PROCEDURE — 99213 OFFICE O/P EST LOW 20 MIN: CPT

## 2023-01-01 RX ORDER — SODIUM CHLORIDE 9 MG/ML
1000 INJECTION, SOLUTION INTRAVENOUS
Refills: 0 | Status: DISCONTINUED | OUTPATIENT
Start: 2023-01-01 | End: 2023-01-01

## 2023-01-01 RX ORDER — DIGOXIN 250 MCG
250 TABLET ORAL DAILY
Refills: 0 | Status: DISCONTINUED | OUTPATIENT
Start: 2023-01-01 | End: 2023-01-01

## 2023-01-01 RX ORDER — CEFEPIME 1 G/1
2000 INJECTION, POWDER, FOR SOLUTION INTRAMUSCULAR; INTRAVENOUS ONCE
Refills: 0 | Status: COMPLETED | OUTPATIENT
Start: 2023-01-01 | End: 2023-01-01

## 2023-01-01 RX ORDER — FUROSEMIDE 40 MG
1 TABLET ORAL
Qty: 0 | Refills: 0 | DISCHARGE

## 2023-01-01 RX ORDER — FLASH GLUCOSE SCANNING READER
EACH MISCELLANEOUS
Qty: 1 | Refills: 0 | Status: DISCONTINUED | COMMUNITY
Start: 2018-11-13 | End: 2023-01-01

## 2023-01-01 RX ORDER — DEXTROSE 50 % IN WATER 50 %
25 SYRINGE (ML) INTRAVENOUS ONCE
Refills: 0 | Status: DISCONTINUED | OUTPATIENT
Start: 2023-01-01 | End: 2023-01-01

## 2023-01-01 RX ORDER — FUROSEMIDE 40 MG/1
40 TABLET ORAL
Qty: 90 | Refills: 3 | Status: DISCONTINUED | COMMUNITY
End: 2023-01-01

## 2023-01-01 RX ORDER — PANTOPRAZOLE 40 MG/1
40 TABLET, DELAYED RELEASE ORAL
Qty: 90 | Refills: 3 | Status: ACTIVE | COMMUNITY

## 2023-01-01 RX ORDER — MEROPENEM 1 G/30ML
2000 INJECTION INTRAVENOUS EVERY 12 HOURS
Refills: 0 | Status: COMPLETED | OUTPATIENT
Start: 2023-01-01 | End: 2023-01-01

## 2023-01-01 RX ORDER — NOREPINEPHRINE BITARTRATE/D5W 8 MG/250ML
0.05 PLASTIC BAG, INJECTION (ML) INTRAVENOUS
Qty: 16 | Refills: 0 | Status: DISCONTINUED | OUTPATIENT
Start: 2023-01-01 | End: 2023-01-01

## 2023-01-01 RX ORDER — INSULIN HUMAN 100 [IU]/ML
10 INJECTION, SOLUTION SUBCUTANEOUS ONCE
Refills: 0 | Status: COMPLETED | OUTPATIENT
Start: 2023-01-01 | End: 2023-01-01

## 2023-01-01 RX ORDER — INSULIN GLARGINE 100 [IU]/ML
40 INJECTION, SOLUTION SUBCUTANEOUS
Qty: 0 | Refills: 0 | DISCHARGE

## 2023-01-01 RX ORDER — INSULIN LISPRO 100/ML
6 VIAL (ML) SUBCUTANEOUS
Refills: 0 | Status: DISCONTINUED | OUTPATIENT
Start: 2023-01-01 | End: 2023-01-01

## 2023-01-01 RX ORDER — CEFEPIME 1 G/1
INJECTION, POWDER, FOR SOLUTION INTRAMUSCULAR; INTRAVENOUS
Refills: 0 | Status: DISCONTINUED | OUTPATIENT
Start: 2023-01-01 | End: 2023-01-01

## 2023-01-01 RX ORDER — VANCOMYCIN HCL 1 G
500 VIAL (EA) INTRAVENOUS EVERY 8 HOURS
Refills: 0 | Status: DISCONTINUED | OUTPATIENT
Start: 2023-01-01 | End: 2023-01-01

## 2023-01-01 RX ORDER — MEROPENEM 1 G/30ML
1000 INJECTION INTRAVENOUS EVERY 12 HOURS
Refills: 0 | Status: COMPLETED | OUTPATIENT
Start: 2023-01-01 | End: 2023-01-01

## 2023-01-01 RX ORDER — MILRINONE LACTATE 1 MG/ML
0.12 INJECTION, SOLUTION INTRAVENOUS
Qty: 20 | Refills: 0 | Status: DISCONTINUED | OUTPATIENT
Start: 2023-01-01 | End: 2023-01-01

## 2023-01-01 RX ORDER — MULTIVIT-MIN/FERROUS GLUCONATE 9 MG/15 ML
1 LIQUID (ML) ORAL DAILY
Refills: 0 | Status: DISCONTINUED | OUTPATIENT
Start: 2023-01-01 | End: 2023-01-01

## 2023-01-01 RX ORDER — HYDRALAZINE HYDROCHLORIDE 10 MG/1
10 TABLET ORAL
Qty: 90 | Refills: 6 | Status: DISCONTINUED | COMMUNITY
Start: 2023-01-01 | End: 2023-01-01

## 2023-01-01 RX ORDER — DEXTROSE 50 % IN WATER 50 %
50 SYRINGE (ML) INTRAVENOUS ONCE
Refills: 0 | Status: COMPLETED | OUTPATIENT
Start: 2023-01-01 | End: 2023-01-01

## 2023-01-01 RX ORDER — DOXYCYCLINE HYCLATE 100 MG/1
100 CAPSULE ORAL
Qty: 60 | Refills: 0 | Status: COMPLETED | COMMUNITY
Start: 2021-02-12 | End: 2023-01-01

## 2023-01-01 RX ORDER — ROSUVASTATIN CALCIUM 5 MG/1
1 TABLET ORAL
Qty: 0 | Refills: 0 | DISCHARGE

## 2023-01-01 RX ORDER — MUPIROCIN 20 MG/G
1 OINTMENT TOPICAL
Refills: 0 | Status: COMPLETED | OUTPATIENT
Start: 2023-01-01 | End: 2023-01-01

## 2023-01-01 RX ORDER — OXYCODONE HYDROCHLORIDE 5 MG/1
10 TABLET ORAL
Refills: 0 | Status: DISCONTINUED | OUTPATIENT
Start: 2023-01-01 | End: 2023-01-01

## 2023-01-01 RX ORDER — METHOTREXATE 2.5 MG/1
5 TABLET ORAL
Refills: 0 | DISCHARGE

## 2023-01-01 RX ORDER — CHLORHEXIDINE GLUCONATE 213 G/1000ML
4 SOLUTION TOPICAL
Qty: 2 | Refills: 0 | Status: DISCONTINUED | COMMUNITY
Start: 2022-10-11 | End: 2023-01-01

## 2023-01-01 RX ORDER — DIGOXIN 250 MCG
250 TABLET ORAL DAILY
Refills: 0 | Status: ACTIVE | OUTPATIENT
Start: 2023-01-01 | End: 2023-12-10

## 2023-01-01 RX ORDER — MEROPENEM 1 G/30ML
1000 INJECTION INTRAVENOUS ONCE
Refills: 0 | Status: COMPLETED | OUTPATIENT
Start: 2023-01-01 | End: 2023-01-01

## 2023-01-01 RX ORDER — ADHESIVE TAPE 0.5"X360"
TAPE, NON-MEDICATED TOPICAL
Qty: 8 | Refills: 2 | Status: DISCONTINUED | COMMUNITY
Start: 2021-01-12 | End: 2023-01-01

## 2023-01-01 RX ORDER — DEXTROSE 50 % IN WATER 50 %
15 SYRINGE (ML) INTRAVENOUS ONCE
Refills: 0 | Status: DISCONTINUED | OUTPATIENT
Start: 2023-01-01 | End: 2023-01-01

## 2023-01-01 RX ORDER — SODIUM ZIRCONIUM CYCLOSILICATE 10 G/10G
10 POWDER, FOR SUSPENSION ORAL ONCE
Refills: 0 | Status: COMPLETED | OUTPATIENT
Start: 2023-01-01 | End: 2023-01-01

## 2023-01-01 RX ORDER — OXYCODONE HYDROCHLORIDE 5 MG/1
10 TABLET ORAL ONCE
Refills: 0 | Status: DISCONTINUED | OUTPATIENT
Start: 2023-01-01 | End: 2023-01-01

## 2023-01-01 RX ORDER — PHENYLEPHRINE HYDROCHLORIDE 10 MG/ML
2 INJECTION INTRAVENOUS
Qty: 160 | Refills: 0 | Status: DISCONTINUED | OUTPATIENT
Start: 2023-01-01 | End: 2023-01-01

## 2023-01-01 RX ORDER — DIGOXIN 250 MCG
1 TABLET ORAL
Qty: 0 | Refills: 0 | DISCHARGE

## 2023-01-01 RX ORDER — DEXTROSE 50 % IN WATER 50 %
12.5 SYRINGE (ML) INTRAVENOUS ONCE
Refills: 0 | Status: DISCONTINUED | OUTPATIENT
Start: 2023-01-01 | End: 2023-01-01

## 2023-01-01 RX ORDER — SODIUM CHLORIDE 5 G/100ML
150 INJECTION, SOLUTION INTRAVENOUS
Refills: 0 | Status: DISCONTINUED | OUTPATIENT
Start: 2023-01-01 | End: 2023-01-01

## 2023-01-01 RX ORDER — SODIUM CHLORIDE 5 G/100ML
150 INJECTION, SOLUTION INTRAVENOUS
Refills: 0 | Status: COMPLETED | OUTPATIENT
Start: 2023-01-01 | End: 2023-01-01

## 2023-01-01 RX ORDER — FENTANYL CITRATE 50 UG/ML
50 INJECTION INTRAVENOUS
Refills: 0 | Status: DISCONTINUED | OUTPATIENT
Start: 2023-01-01 | End: 2023-01-01

## 2023-01-01 RX ORDER — SODIUM BICARBONATE 1 MEQ/ML
100 SYRINGE (ML) INTRAVENOUS ONCE
Refills: 0 | Status: COMPLETED | OUTPATIENT
Start: 2023-01-01 | End: 2023-01-01

## 2023-01-01 RX ORDER — MEROPENEM 1 G/30ML
1000 INJECTION INTRAVENOUS EVERY 12 HOURS
Refills: 0 | Status: DISCONTINUED | OUTPATIENT
Start: 2023-01-01 | End: 2023-01-01

## 2023-01-01 RX ORDER — SODIUM CHLORIDE 9 MG/ML
250 INJECTION INTRAMUSCULAR; INTRAVENOUS; SUBCUTANEOUS ONCE
Refills: 0 | Status: COMPLETED | OUTPATIENT
Start: 2023-01-01 | End: 2023-01-01

## 2023-01-01 RX ORDER — CEFUROXIME AXETIL 500 MG/1
500 TABLET ORAL
Qty: 20 | Refills: 0 | Status: DISCONTINUED | COMMUNITY
Start: 2022-11-15

## 2023-01-01 RX ORDER — OLANZAPINE 15 MG/1
5 TABLET, FILM COATED ORAL ONCE
Refills: 0 | Status: COMPLETED | OUTPATIENT
Start: 2023-01-01 | End: 2023-01-01

## 2023-01-01 RX ORDER — PROCAINAMIDE HCL 500 MG
2 TABLET, EXTENDED RELEASE ORAL
Qty: 2000 | Refills: 0 | Status: DISCONTINUED | OUTPATIENT
Start: 2023-01-01 | End: 2023-01-01

## 2023-01-01 RX ORDER — BACITRACIN ZINC 500 UNIT/G
1 OINTMENT IN PACKET (EA) TOPICAL
Refills: 0 | Status: DISCONTINUED | OUTPATIENT
Start: 2023-01-01 | End: 2023-01-01

## 2023-01-01 RX ORDER — INSULIN GLARGINE 100 [IU]/ML
25 INJECTION, SOLUTION SUBCUTANEOUS
Qty: 0 | Refills: 0 | DISCHARGE
Start: 2023-01-01

## 2023-01-01 RX ORDER — HYDRALAZINE HCL 50 MG
10 TABLET ORAL THREE TIMES A DAY
Refills: 0 | Status: DISCONTINUED | OUTPATIENT
Start: 2023-01-01 | End: 2023-01-01

## 2023-01-01 RX ORDER — SPIRONOLACTONE 25 MG/1
25 TABLET ORAL
Qty: 30 | Refills: 1 | Status: ACTIVE | COMMUNITY
Start: 2021-01-27

## 2023-01-01 RX ORDER — CEFAZOLIN SODIUM 1 G
1000 VIAL (EA) INJECTION EVERY 8 HOURS
Refills: 0 | Status: DISCONTINUED | OUTPATIENT
Start: 2023-01-01 | End: 2023-01-01

## 2023-01-01 RX ORDER — HYDRALAZINE HCL 50 MG
1 TABLET ORAL
Qty: 30 | Refills: 0
Start: 2023-01-01 | End: 2023-01-01

## 2023-01-01 RX ORDER — GABAPENTIN 400 MG/1
100 CAPSULE ORAL EVERY 12 HOURS
Refills: 0 | Status: DISCONTINUED | OUTPATIENT
Start: 2023-01-01 | End: 2023-01-01

## 2023-01-01 RX ORDER — PEN NEEDLE, DIABETIC, SAFETY 30 GX3/16"
30G X 5 MM NEEDLE, DISPOSABLE MISCELLANEOUS
Qty: 1 | Refills: 1 | Status: DISCONTINUED | COMMUNITY
Start: 2022-10-26 | End: 2023-01-01

## 2023-01-01 RX ORDER — ONDANSETRON 8 MG/1
1 TABLET, FILM COATED ORAL
Qty: 0 | Refills: 0 | DISCHARGE

## 2023-01-01 RX ORDER — GLUCAGON INJECTION, SOLUTION 0.5 MG/.1ML
1 INJECTION, SOLUTION SUBCUTANEOUS ONCE
Refills: 0 | Status: COMPLETED | OUTPATIENT
Start: 2023-01-01 | End: 2023-01-01

## 2023-01-01 RX ORDER — DEXMEDETOMIDINE HYDROCHLORIDE IN 0.9% SODIUM CHLORIDE 4 UG/ML
0.2 INJECTION INTRAVENOUS
Qty: 400 | Refills: 0 | Status: DISCONTINUED | OUTPATIENT
Start: 2023-01-01 | End: 2023-01-01

## 2023-01-01 RX ORDER — INSULIN GLARGINE 100 [IU]/ML
5 INJECTION, SOLUTION SUBCUTANEOUS AT BEDTIME
Refills: 0 | Status: DISCONTINUED | OUTPATIENT
Start: 2023-01-01 | End: 2023-01-01

## 2023-01-01 RX ORDER — INSULIN GLARGINE 100 [IU]/ML
32 INJECTION, SOLUTION SUBCUTANEOUS EVERY MORNING
Refills: 0 | Status: DISCONTINUED | OUTPATIENT
Start: 2023-01-01 | End: 2023-01-01

## 2023-01-01 RX ORDER — ALPRAZOLAM 0.25 MG
0.25 TABLET ORAL ONCE
Refills: 0 | Status: DISCONTINUED | OUTPATIENT
Start: 2023-01-01 | End: 2023-01-01

## 2023-01-01 RX ORDER — DIAZEPAM 5 MG
1 TABLET ORAL AT BEDTIME
Refills: 0 | Status: DISCONTINUED | OUTPATIENT
Start: 2023-01-01 | End: 2023-01-01

## 2023-01-01 RX ORDER — QUETIAPINE FUMARATE 200 MG/1
25 TABLET, FILM COATED ORAL DAILY
Refills: 0 | Status: DISCONTINUED | OUTPATIENT
Start: 2023-01-01 | End: 2023-01-01

## 2023-01-01 RX ORDER — DULOXETINE HYDROCHLORIDE 30 MG/1
30 CAPSULE, DELAYED RELEASE ORAL
Refills: 0 | Status: DISCONTINUED | OUTPATIENT
Start: 2023-01-01 | End: 2023-01-01

## 2023-01-01 RX ORDER — GLUCAGON INJECTION, SOLUTION 0.5 MG/.1ML
1 INJECTION, SOLUTION SUBCUTANEOUS ONCE
Refills: 0 | Status: DISCONTINUED | OUTPATIENT
Start: 2023-01-01 | End: 2023-01-01

## 2023-01-01 RX ORDER — INSULIN GLARGINE 100 [IU]/ML
8 INJECTION, SOLUTION SUBCUTANEOUS
Qty: 3 | Refills: 0
Start: 2023-01-01 | End: 2023-01-01

## 2023-01-01 RX ORDER — LACTULOSE 10 G/15ML
10 SOLUTION ORAL AT BEDTIME
Refills: 0 | Status: DISCONTINUED | OUTPATIENT
Start: 2023-01-01 | End: 2023-01-01

## 2023-01-01 RX ORDER — HALOPERIDOL DECANOATE 100 MG/ML
2 INJECTION INTRAMUSCULAR ONCE
Refills: 0 | Status: COMPLETED | OUTPATIENT
Start: 2023-01-01 | End: 2023-01-01

## 2023-01-01 RX ORDER — GEL DRESSING
GEL (GRAM) TOPICAL
Qty: 1 | Refills: 1 | Status: DISCONTINUED | COMMUNITY
Start: 2020-07-10 | End: 2023-01-01

## 2023-01-01 RX ORDER — DEXMEDETOMIDINE HYDROCHLORIDE IN 0.9% SODIUM CHLORIDE 4 UG/ML
0.2 INJECTION INTRAVENOUS
Qty: 200 | Refills: 0 | Status: DISCONTINUED | OUTPATIENT
Start: 2023-01-01 | End: 2023-01-01

## 2023-01-01 RX ORDER — TAMSULOSIN HYDROCHLORIDE 0.4 MG/1
0.4 CAPSULE ORAL TWICE DAILY
Refills: 0 | Status: DISCONTINUED | COMMUNITY
End: 2023-01-01

## 2023-01-01 RX ORDER — MAGNESIUM SULFATE 500 MG/ML
2 VIAL (ML) INJECTION ONCE
Refills: 0 | Status: DISCONTINUED | OUTPATIENT
Start: 2023-01-01 | End: 2023-01-01

## 2023-01-01 RX ORDER — PROCAINAMIDE HCL 500 MG
1000 TABLET, EXTENDED RELEASE ORAL ONCE
Refills: 0 | Status: COMPLETED | OUTPATIENT
Start: 2023-01-01 | End: 2023-01-01

## 2023-01-01 RX ORDER — VANCOMYCIN HCL 1 G
1000 VIAL (EA) INTRAVENOUS EVERY 12 HOURS
Refills: 0 | Status: DISCONTINUED | OUTPATIENT
Start: 2023-01-01 | End: 2023-01-01

## 2023-01-01 RX ORDER — ENOXAPARIN SODIUM 100 MG/ML
30 INJECTION SUBCUTANEOUS EVERY 24 HOURS
Refills: 0 | Status: DISCONTINUED | OUTPATIENT
Start: 2023-01-01 | End: 2023-01-01

## 2023-01-01 RX ORDER — FOLIC ACID 1 MG/1
1 TABLET ORAL
Qty: 30 | Refills: 2 | Status: ACTIVE | COMMUNITY
Start: 2023-01-01

## 2023-01-01 RX ORDER — DULOXETINE HYDROCHLORIDE 30 MG/1
1 CAPSULE, DELAYED RELEASE ORAL
Qty: 0 | Refills: 0 | DISCHARGE

## 2023-01-01 RX ORDER — PRASUGREL 5 MG/1
10 TABLET, FILM COATED ORAL DAILY
Refills: 0 | Status: DISCONTINUED | OUTPATIENT
Start: 2023-01-01 | End: 2023-01-01

## 2023-01-01 RX ORDER — METOPROLOL SUCCINATE 25 MG/1
25 TABLET, EXTENDED RELEASE ORAL
Qty: 90 | Refills: 3 | Status: ACTIVE | COMMUNITY
Start: 2023-01-01 | End: 1900-01-01

## 2023-01-01 RX ORDER — DEXTROSE 50 % IN WATER 50 %
50 SYRINGE (ML) INTRAVENOUS
Refills: 0 | Status: COMPLETED | OUTPATIENT
Start: 2023-01-01 | End: 2023-01-01

## 2023-01-01 RX ORDER — OXYCODONE HYDROCHLORIDE 5 MG/1
5 TABLET ORAL EVERY 6 HOURS
Refills: 0 | Status: DISCONTINUED | OUTPATIENT
Start: 2023-01-01 | End: 2023-01-01

## 2023-01-01 RX ORDER — FUROSEMIDE 40 MG
40 TABLET ORAL ONCE
Refills: 0 | Status: COMPLETED | OUTPATIENT
Start: 2023-01-01 | End: 2023-01-01

## 2023-01-01 RX ORDER — INSULIN GLARGINE 100 [IU]/ML
10 INJECTION, SOLUTION SUBCUTANEOUS AT BEDTIME
Refills: 0 | Status: DISCONTINUED | OUTPATIENT
Start: 2023-01-01 | End: 2023-01-01

## 2023-01-01 RX ORDER — HYDROMORPHONE HYDROCHLORIDE 2 MG/ML
0.5 INJECTION INTRAMUSCULAR; INTRAVENOUS; SUBCUTANEOUS ONCE
Refills: 0 | Status: DISCONTINUED | OUTPATIENT
Start: 2023-01-01 | End: 2023-01-01

## 2023-01-01 RX ORDER — LOSARTAN POTASSIUM 100 MG/1
25 TABLET, FILM COATED ORAL DAILY
Refills: 0 | Status: DISCONTINUED | OUTPATIENT
Start: 2023-01-01 | End: 2023-01-01

## 2023-01-01 RX ORDER — TAMSULOSIN HYDROCHLORIDE 0.4 MG/1
0.4 CAPSULE ORAL AT BEDTIME
Refills: 0 | Status: DISCONTINUED | OUTPATIENT
Start: 2023-01-01 | End: 2023-01-01

## 2023-01-01 RX ORDER — MIDODRINE HYDROCHLORIDE 2.5 MG/1
10 TABLET ORAL THREE TIMES A DAY
Refills: 0 | Status: DISCONTINUED | OUTPATIENT
Start: 2023-01-01 | End: 2023-01-01

## 2023-01-01 RX ORDER — FUROSEMIDE 40 MG
60 TABLET ORAL ONCE
Refills: 0 | Status: COMPLETED | OUTPATIENT
Start: 2023-01-01 | End: 2023-01-01

## 2023-01-01 RX ORDER — CALCIUM CARBONATE/VITAMIN D3 600 MG-10
TABLET ORAL DAILY
Refills: 0 | Status: ACTIVE | COMMUNITY

## 2023-01-01 RX ORDER — HALOPERIDOL DECANOATE 100 MG/ML
1 INJECTION INTRAMUSCULAR EVERY 8 HOURS
Refills: 0 | Status: DISCONTINUED | OUTPATIENT
Start: 2023-01-01 | End: 2023-01-01

## 2023-01-01 RX ORDER — MAGNESIUM SULFATE 500 MG/ML
2 VIAL (ML) INJECTION ONCE
Refills: 0 | Status: COMPLETED | OUTPATIENT
Start: 2023-01-01 | End: 2023-01-01

## 2023-01-01 RX ORDER — ASPIRIN 81 MG/1
81 TABLET, COATED ORAL DAILY
Refills: 3 | Status: DISCONTINUED | COMMUNITY
End: 2023-01-01

## 2023-01-01 RX ORDER — VANCOMYCIN HCL 1 G
1000 VIAL (EA) INTRAVENOUS EVERY 24 HOURS
Refills: 0 | Status: DISCONTINUED | OUTPATIENT
Start: 2023-01-01 | End: 2023-01-01

## 2023-01-01 RX ORDER — POTASSIUM CHLORIDE 20 MEQ
20 PACKET (EA) ORAL ONCE
Refills: 0 | Status: COMPLETED | OUTPATIENT
Start: 2023-01-01 | End: 2023-01-01

## 2023-01-01 RX ORDER — INSULIN ASPART 100 [IU]/ML
100 INJECTION, SOLUTION INTRAVENOUS; SUBCUTANEOUS
Qty: 90 | Refills: 2 | Status: DISCONTINUED | COMMUNITY
Start: 2022-04-14 | End: 2023-01-01

## 2023-01-01 RX ORDER — DEXTROSE 10 % IN WATER 10 %
1000 INTRAVENOUS SOLUTION INTRAVENOUS
Refills: 0 | Status: DISCONTINUED | OUTPATIENT
Start: 2023-01-01 | End: 2023-01-01

## 2023-01-01 RX ORDER — SODIUM BICARBONATE 1 MEQ/ML
150 SYRINGE (ML) INTRAVENOUS ONCE
Refills: 0 | Status: COMPLETED | OUTPATIENT
Start: 2023-01-01 | End: 2023-01-01

## 2023-01-01 RX ORDER — INSULIN GLARGINE 100 [IU]/ML
60 INJECTION, SOLUTION SUBCUTANEOUS
Qty: 0 | Refills: 0 | DISCHARGE

## 2023-01-01 RX ORDER — INSULIN GLARGINE 100 [IU]/ML
10 INJECTION, SOLUTION SUBCUTANEOUS
Qty: 0 | Refills: 0 | DISCHARGE
Start: 2023-01-01

## 2023-01-01 RX ORDER — SODIUM BICARBONATE 1 MEQ/ML
60 SYRINGE (ML) INTRAVENOUS ONCE
Refills: 0 | Status: COMPLETED | OUTPATIENT
Start: 2023-01-01 | End: 2023-01-01

## 2023-01-01 RX ORDER — SODIUM POLYSTYRENE SULFONATE 4.1 MEQ/G
15 POWDER, FOR SUSPENSION ORAL ONCE
Refills: 0 | Status: DISCONTINUED | OUTPATIENT
Start: 2023-01-01 | End: 2023-01-01

## 2023-01-01 RX ORDER — TORSEMIDE 20 MG/1
20 TABLET ORAL DAILY
Refills: 0 | Status: ACTIVE | COMMUNITY
Start: 2023-01-01

## 2023-01-01 RX ORDER — INSULIN GLARGINE 100 [IU]/ML
60 INJECTION, SOLUTION SUBCUTANEOUS EVERY MORNING
Refills: 0 | Status: DISCONTINUED | OUTPATIENT
Start: 2023-01-01 | End: 2023-01-01

## 2023-01-01 RX ORDER — ACETAMINOPHEN 500 MG
975 TABLET ORAL ONCE
Refills: 0 | Status: COMPLETED | OUTPATIENT
Start: 2023-01-01 | End: 2023-01-01

## 2023-01-01 RX ORDER — SILVER SULFADIAZINE 10 MG/G
1 CREAM TOPICAL TWICE DAILY
Qty: 3 | Refills: 2 | Status: DISCONTINUED | COMMUNITY
Start: 2020-08-25 | End: 2023-01-01

## 2023-01-01 RX ORDER — BACITRACIN ZINC 500 UNIT/G
1 OINTMENT IN PACKET (EA) TOPICAL EVERY 12 HOURS
Refills: 0 | Status: DISCONTINUED | OUTPATIENT
Start: 2023-01-01 | End: 2023-01-01

## 2023-01-01 RX ORDER — INSULIN LISPRO 100/ML
20 VIAL (ML) SUBCUTANEOUS
Refills: 0 | Status: DISCONTINUED | OUTPATIENT
Start: 2023-01-01 | End: 2023-01-01

## 2023-01-01 RX ORDER — IRON SUCROSE 20 MG/ML
200 INJECTION, SOLUTION INTRAVENOUS EVERY 24 HOURS
Refills: 0 | Status: DISCONTINUED | OUTPATIENT
Start: 2023-01-01 | End: 2023-01-01

## 2023-01-01 RX ORDER — QUETIAPINE FUMARATE 200 MG/1
50 TABLET, FILM COATED ORAL AT BEDTIME
Refills: 0 | Status: DISCONTINUED | OUTPATIENT
Start: 2023-01-01 | End: 2023-01-01

## 2023-01-01 RX ORDER — POLYETHYLENE GLYCOL 3350 17 G/17G
17 POWDER, FOR SOLUTION ORAL
Refills: 0 | Status: DISCONTINUED | OUTPATIENT
Start: 2023-01-01 | End: 2023-01-01

## 2023-01-01 RX ORDER — GENTAMICIN SULFATE 1 MG/G
0.1 OINTMENT TOPICAL TWICE DAILY
Qty: 30 | Refills: 1 | Status: DISCONTINUED | COMMUNITY
Start: 2022-10-18 | End: 2023-01-01

## 2023-01-01 RX ORDER — INSULIN GLARGINE 100 [IU]/ML
30 INJECTION, SOLUTION SUBCUTANEOUS EVERY MORNING
Refills: 0 | Status: DISCONTINUED | OUTPATIENT
Start: 2023-01-01 | End: 2023-01-01

## 2023-01-01 RX ORDER — INSULIN ASPART 100 [IU]/ML
100 INJECTION, SOLUTION INTRAVENOUS; SUBCUTANEOUS
Qty: 6 | Refills: 1 | Status: DISCONTINUED | COMMUNITY
Start: 2022-10-26 | End: 2023-01-01

## 2023-01-01 RX ORDER — LEVOTHYROXINE SODIUM 125 MCG
25 TABLET ORAL DAILY
Refills: 0 | Status: DISCONTINUED | OUTPATIENT
Start: 2023-01-01 | End: 2023-01-01

## 2023-01-01 RX ORDER — HYDROCORTISONE 20 MG
100 TABLET ORAL EVERY 8 HOURS
Refills: 0 | Status: DISCONTINUED | OUTPATIENT
Start: 2023-01-01 | End: 2023-01-01

## 2023-01-01 RX ORDER — FUROSEMIDE 40 MG
20 TABLET ORAL DAILY
Refills: 0 | Status: DISCONTINUED | OUTPATIENT
Start: 2023-01-01 | End: 2023-01-01

## 2023-01-01 RX ORDER — POTASSIUM CHLORIDE 20 MEQ
20 PACKET (EA) ORAL
Refills: 0 | Status: DISCONTINUED | OUTPATIENT
Start: 2023-01-01 | End: 2023-01-01

## 2023-01-01 RX ORDER — FLUCONAZOLE 200 MG/1
200 TABLET ORAL
Qty: 7 | Refills: 0 | Status: DISCONTINUED | COMMUNITY
Start: 2022-01-01

## 2023-01-01 RX ORDER — MAGNESIUM SULFATE 500 MG/ML
VIAL (ML) INJECTION
Refills: 0 | Status: DISCONTINUED | OUTPATIENT
Start: 2023-01-01 | End: 2023-01-01

## 2023-01-01 RX ORDER — CEFAZOLIN SODIUM 1 G
2000 VIAL (EA) INJECTION ONCE
Refills: 0 | Status: DISCONTINUED | OUTPATIENT
Start: 2023-01-01 | End: 2023-01-01

## 2023-01-01 RX ORDER — INSULIN GLARGINE 100 [IU]/ML
16 INJECTION, SOLUTION SUBCUTANEOUS EVERY MORNING
Refills: 0 | Status: DISCONTINUED | OUTPATIENT
Start: 2023-01-01 | End: 2023-01-01

## 2023-01-01 RX ORDER — INSULIN LISPRO-AABC 100 [IU]/ML
100 INJECTION, SOLUTION INTRAVENOUS; SUBCUTANEOUS
Qty: 15 | Refills: 1 | Status: DISCONTINUED | COMMUNITY
Start: 2021-09-20 | End: 2023-01-01

## 2023-01-01 RX ORDER — LOSARTAN POTASSIUM 100 MG/1
1 TABLET, FILM COATED ORAL
Refills: 0 | DISCHARGE

## 2023-01-01 RX ORDER — SODIUM ZIRCONIUM CYCLOSILICATE 10 G/10G
5 POWDER, FOR SUSPENSION ORAL ONCE
Refills: 0 | Status: COMPLETED | OUTPATIENT
Start: 2023-01-01 | End: 2023-01-01

## 2023-01-01 RX ORDER — INSULIN GLARGINE 100 [IU]/ML
8 INJECTION, SOLUTION SUBCUTANEOUS EVERY MORNING
Refills: 0 | Status: DISCONTINUED | OUTPATIENT
Start: 2023-01-01 | End: 2023-01-01

## 2023-01-01 RX ORDER — LEVOTHYROXINE SODIUM 125 MCG
1 TABLET ORAL
Qty: 0 | Refills: 0 | DISCHARGE

## 2023-01-01 RX ORDER — QUETIAPINE FUMARATE 200 MG/1
1 TABLET, FILM COATED ORAL
Qty: 0 | Refills: 0 | DISCHARGE
Start: 2023-01-01

## 2023-01-01 RX ORDER — POTASSIUM CHLORIDE 20 MEQ
20 PACKET (EA) ORAL
Refills: 0 | Status: COMPLETED | OUTPATIENT
Start: 2023-01-01 | End: 2023-01-01

## 2023-01-01 RX ORDER — INSULIN GLARGINE 100 [IU]/ML
12 INJECTION, SOLUTION SUBCUTANEOUS EVERY MORNING
Refills: 0 | Status: DISCONTINUED | OUTPATIENT
Start: 2023-01-01 | End: 2023-01-01

## 2023-01-01 RX ORDER — INSULIN LISPRO 100/ML
VIAL (ML) SUBCUTANEOUS
Refills: 0 | Status: DISCONTINUED | OUTPATIENT
Start: 2023-01-01 | End: 2023-01-01

## 2023-01-01 RX ORDER — METHOTREXATE 2.5 MG/1
10 TABLET ORAL
Refills: 0 | Status: DISCONTINUED | OUTPATIENT
Start: 2023-01-01 | End: 2023-01-01

## 2023-01-01 RX ORDER — AMIODARONE HYDROCHLORIDE 400 MG/1
1 TABLET ORAL
Qty: 450 | Refills: 0 | Status: DISCONTINUED | OUTPATIENT
Start: 2023-01-01 | End: 2023-01-01

## 2023-01-01 RX ORDER — FUROSEMIDE 40 MG
40 TABLET ORAL ONCE
Refills: 0 | Status: DISCONTINUED | OUTPATIENT
Start: 2023-01-01 | End: 2023-01-01

## 2023-01-01 RX ORDER — INSULIN GLARGINE 100 [IU]/ML
18 INJECTION, SOLUTION SUBCUTANEOUS AT BEDTIME
Refills: 0 | Status: DISCONTINUED | OUTPATIENT
Start: 2023-01-01 | End: 2023-01-01

## 2023-01-01 RX ORDER — OXYCODONE HYDROCHLORIDE 5 MG/1
5 TABLET ORAL ONCE
Refills: 0 | Status: DISCONTINUED | OUTPATIENT
Start: 2023-01-01 | End: 2023-01-01

## 2023-01-01 RX ORDER — POTASSIUM CHLORIDE 20 MEQ
20 PACKET (EA) ORAL ONCE
Refills: 0 | Status: DISCONTINUED | OUTPATIENT
Start: 2023-01-01 | End: 2023-01-01

## 2023-01-01 RX ORDER — INSULIN LISPRO 100/ML
VIAL (ML) SUBCUTANEOUS
Refills: 0 | Status: ACTIVE | OUTPATIENT
Start: 2023-01-01 | End: 2023-12-10

## 2023-01-01 RX ORDER — HYDROCORTISONE 1 %
1 OINTMENT (GRAM) TOPICAL
Refills: 0 | Status: DISCONTINUED | OUTPATIENT
Start: 2023-01-01 | End: 2023-01-01

## 2023-01-01 RX ORDER — IRON SUCROSE 20 MG/ML
200 INJECTION, SOLUTION INTRAVENOUS EVERY 24 HOURS
Refills: 0 | Status: COMPLETED | OUTPATIENT
Start: 2023-01-01 | End: 2023-01-01

## 2023-01-01 RX ORDER — DULOXETINE HYDROCHLORIDE 30 MG/1
30 CAPSULE, DELAYED RELEASE PELLETS ORAL DAILY
Qty: 90 | Refills: 2 | Status: ACTIVE | COMMUNITY
Start: 2021-01-20

## 2023-01-01 RX ORDER — ACETAMINOPHEN 500 MG
650 TABLET ORAL EVERY 6 HOURS
Refills: 0 | Status: DISCONTINUED | OUTPATIENT
Start: 2023-01-01 | End: 2023-01-01

## 2023-01-01 RX ORDER — AMIODARONE HYDROCHLORIDE 400 MG/1
150 TABLET ORAL ONCE
Refills: 0 | Status: COMPLETED | OUTPATIENT
Start: 2023-01-01 | End: 2023-01-01

## 2023-01-01 RX ORDER — FLASH GLUCOSE SENSOR
KIT MISCELLANEOUS
Qty: 6 | Refills: 3 | Status: DISCONTINUED | COMMUNITY
Start: 2021-11-04 | End: 2023-01-01

## 2023-01-01 RX ORDER — MIDODRINE HYDROCHLORIDE 2.5 MG/1
10 TABLET ORAL ONCE
Refills: 0 | Status: COMPLETED | OUTPATIENT
Start: 2023-01-01 | End: 2023-01-01

## 2023-01-01 RX ORDER — FUROSEMIDE 40 MG
60 TABLET ORAL DAILY
Refills: 0 | Status: DISCONTINUED | OUTPATIENT
Start: 2023-01-01 | End: 2023-01-01

## 2023-01-01 RX ORDER — SODIUM BICARBONATE 1 MEQ/ML
50 SYRINGE (ML) INTRAVENOUS ONCE
Refills: 0 | Status: COMPLETED | OUTPATIENT
Start: 2023-01-01 | End: 2023-01-01

## 2023-01-01 RX ORDER — METOPROLOL TARTRATE 50 MG
25 TABLET ORAL DAILY
Refills: 0 | Status: DISCONTINUED | OUTPATIENT
Start: 2023-01-01 | End: 2023-01-01

## 2023-01-01 RX ORDER — METHOTREXATE 2.5 MG/1
2.5 TABLET ORAL
Qty: 24 | Refills: 2 | Status: ACTIVE | COMMUNITY
Start: 2023-01-01

## 2023-01-01 RX ORDER — ATORVASTATIN CALCIUM 80 MG/1
80 TABLET, FILM COATED ORAL AT BEDTIME
Refills: 0 | Status: DISCONTINUED | OUTPATIENT
Start: 2023-01-01 | End: 2023-01-01

## 2023-01-01 RX ORDER — INSULIN LISPRO 100 [IU]/ML
100 INJECTION, SOLUTION INTRAVENOUS; SUBCUTANEOUS DAILY
Refills: 0 | Status: ACTIVE | COMMUNITY

## 2023-01-01 RX ORDER — LEVOTHYROXINE SODIUM 0.03 MG/1
25 TABLET ORAL DAILY
Qty: 90 | Refills: 1 | Status: ACTIVE | COMMUNITY
Start: 2021-11-10

## 2023-01-01 RX ORDER — LEVOFLOXACIN 500 MG/1
500 TABLET, FILM COATED ORAL DAILY
Qty: 7 | Refills: 0 | Status: DISCONTINUED | COMMUNITY
Start: 2022-08-19 | End: 2023-01-01

## 2023-01-01 RX ORDER — POLYETHYLENE GLYCOL 3350 17 G/17G
17 POWDER, FOR SOLUTION ORAL ONCE
Refills: 0 | Status: COMPLETED | OUTPATIENT
Start: 2023-01-01 | End: 2023-01-01

## 2023-01-01 RX ORDER — FENTANYL CITRATE 50 UG/ML
0.5 INJECTION INTRAVENOUS
Qty: 2500 | Refills: 0 | Status: DISCONTINUED | OUTPATIENT
Start: 2023-01-01 | End: 2023-01-01

## 2023-01-01 RX ORDER — ROBINUL 0.2 MG/ML
0.2 INJECTION INTRAMUSCULAR; INTRAVENOUS EVERY 6 HOURS
Refills: 0 | Status: DISCONTINUED | OUTPATIENT
Start: 2023-01-01 | End: 2023-01-01

## 2023-01-01 RX ORDER — INSULIN LISPRO 100/ML
VIAL (ML) SUBCUTANEOUS AT BEDTIME
Refills: 0 | Status: DISCONTINUED | OUTPATIENT
Start: 2023-01-01 | End: 2023-01-01

## 2023-01-01 RX ORDER — HEPARIN SODIUM 5000 [USP'U]/ML
5000 INJECTION INTRAVENOUS; SUBCUTANEOUS EVERY 12 HOURS
Refills: 0 | Status: DISCONTINUED | OUTPATIENT
Start: 2023-01-01 | End: 2023-01-01

## 2023-01-01 RX ORDER — INSULIN GLARGINE 100 [IU]/ML
25 INJECTION, SOLUTION SUBCUTANEOUS AT BEDTIME
Refills: 0 | Status: DISCONTINUED | OUTPATIENT
Start: 2023-01-01 | End: 2023-01-01

## 2023-01-01 RX ORDER — BUMETANIDE 0.25 MG/ML
2 INJECTION INTRAMUSCULAR; INTRAVENOUS
Refills: 0 | Status: DISCONTINUED | OUTPATIENT
Start: 2023-01-01 | End: 2023-01-01

## 2023-01-01 RX ORDER — SPIRONOLACTONE 25 MG/1
25 TABLET, FILM COATED ORAL DAILY
Refills: 0 | Status: DISCONTINUED | OUTPATIENT
Start: 2023-01-01 | End: 2023-01-01

## 2023-01-01 RX ORDER — INSULIN LISPRO 100/ML
5 VIAL (ML) SUBCUTANEOUS
Refills: 0 | Status: DISCONTINUED | OUTPATIENT
Start: 2023-01-01 | End: 2023-01-01

## 2023-01-01 RX ORDER — PREDNISONE 10 MG/1
10 TABLET ORAL
Qty: 60 | Refills: 0 | Status: DISCONTINUED | COMMUNITY
Start: 2023-01-01 | End: 2023-01-01

## 2023-01-01 RX ORDER — FUROSEMIDE 40 MG
20 TABLET ORAL
Refills: 0 | Status: DISCONTINUED | OUTPATIENT
Start: 2023-01-01 | End: 2023-01-01

## 2023-01-01 RX ORDER — INSULIN LISPRO 100/ML
8 VIAL (ML) SUBCUTANEOUS
Refills: 0 | Status: DISCONTINUED | OUTPATIENT
Start: 2023-01-01 | End: 2023-01-01

## 2023-01-01 RX ORDER — CHLORHEXIDINE GLUCONATE 213 G/1000ML
15 SOLUTION TOPICAL EVERY 12 HOURS
Refills: 0 | Status: DISCONTINUED | OUTPATIENT
Start: 2023-01-01 | End: 2023-01-01

## 2023-01-01 RX ORDER — LATANOPROST 0.05 MG/ML
1 SOLUTION/ DROPS OPHTHALMIC; TOPICAL AT BEDTIME
Refills: 0 | Status: DISCONTINUED | OUTPATIENT
Start: 2023-01-01 | End: 2023-01-01

## 2023-01-01 RX ORDER — BETHANECHOL CHLORIDE 25 MG/1
25 TABLET ORAL
Qty: 90 | Refills: 0 | Status: ACTIVE | COMMUNITY
Start: 2023-01-01

## 2023-01-01 RX ORDER — SPIRONOLACTONE 25 MG/1
1 TABLET, FILM COATED ORAL
Qty: 15 | Refills: 0 | DISCHARGE
Start: 2023-01-01 | End: 2023-01-01

## 2023-01-01 RX ORDER — VANCOMYCIN HCL 1 G
1250 VIAL (EA) INTRAVENOUS ONCE
Refills: 0 | Status: COMPLETED | OUTPATIENT
Start: 2023-01-01 | End: 2023-01-01

## 2023-01-01 RX ORDER — DEXTROSE 50 % IN WATER 50 %
25 SYRINGE (ML) INTRAVENOUS ONCE
Refills: 0 | Status: COMPLETED | OUTPATIENT
Start: 2023-01-01 | End: 2023-01-01

## 2023-01-01 RX ORDER — CEFPODOXIME PROXETIL 200 MG/1
200 TABLET, FILM COATED ORAL
Qty: 10 | Refills: 0 | Status: DISCONTINUED | COMMUNITY
Start: 2023-01-01

## 2023-01-01 RX ORDER — CEFAZOLIN SODIUM 1 G
2000 VIAL (EA) INJECTION EVERY 8 HOURS
Refills: 0 | Status: DISCONTINUED | OUTPATIENT
Start: 2023-01-01 | End: 2023-01-01

## 2023-01-01 RX ORDER — DEXTROSE 50 % IN WATER 50 %
50 SYRINGE (ML) INTRAVENOUS
Refills: 0 | Status: DISCONTINUED | OUTPATIENT
Start: 2023-01-01 | End: 2023-01-01

## 2023-01-01 RX ORDER — MUPIROCIN 20 MG/G
2 OINTMENT TOPICAL
Qty: 2 | Refills: 0 | Status: DISCONTINUED | COMMUNITY
Start: 2022-10-11 | End: 2023-01-01

## 2023-01-01 RX ORDER — OXYCODONE 5 MG/1
5 TABLET ORAL EVERY 6 HOURS
Qty: 20 | Refills: 0 | Status: DISCONTINUED | COMMUNITY
Start: 2022-07-06 | End: 2023-01-01

## 2023-01-01 RX ORDER — SODIUM CHLORIDE 9 MG/ML
1000 INJECTION, SOLUTION INTRAVENOUS ONCE
Refills: 0 | Status: COMPLETED | OUTPATIENT
Start: 2023-01-01 | End: 2023-01-01

## 2023-01-01 RX ORDER — VANCOMYCIN HCL 1 G
VIAL (EA) INTRAVENOUS
Refills: 0 | Status: DISCONTINUED | OUTPATIENT
Start: 2023-01-01 | End: 2023-01-01

## 2023-01-01 RX ORDER — LOSARTAN POTASSIUM 25 MG/1
25 TABLET, FILM COATED ORAL
Qty: 180 | Refills: 2 | Status: ACTIVE | COMMUNITY
Start: 2023-01-01 | End: 1900-01-01

## 2023-01-01 RX ORDER — FOLIC ACID 0.8 MG
1 TABLET ORAL DAILY
Refills: 0 | Status: DISCONTINUED | OUTPATIENT
Start: 2023-01-01 | End: 2023-01-01

## 2023-01-01 RX ORDER — PANTOPRAZOLE SODIUM 20 MG/1
40 TABLET, DELAYED RELEASE ORAL DAILY
Refills: 0 | Status: DISCONTINUED | OUTPATIENT
Start: 2023-01-01 | End: 2023-01-01

## 2023-01-01 RX ORDER — SENNA PLUS 8.6 MG/1
2 TABLET ORAL AT BEDTIME
Refills: 0 | Status: DISCONTINUED | OUTPATIENT
Start: 2023-01-01 | End: 2023-01-01

## 2023-01-01 RX ORDER — ENOXAPARIN SODIUM 100 MG/ML
40 INJECTION SUBCUTANEOUS EVERY 24 HOURS
Refills: 0 | Status: DISCONTINUED | OUTPATIENT
Start: 2023-01-01 | End: 2023-01-01

## 2023-01-01 RX ORDER — NOREPINEPHRINE BITARTRATE/D5W 8 MG/250ML
0.05 PLASTIC BAG, INJECTION (ML) INTRAVENOUS
Qty: 8 | Refills: 0 | Status: DISCONTINUED | OUTPATIENT
Start: 2023-01-01 | End: 2023-01-01

## 2023-01-01 RX ORDER — FOLIC ACID 0.8 MG
1 TABLET ORAL
Refills: 0 | DISCHARGE

## 2023-01-01 RX ORDER — ONDANSETRON 8 MG/1
4 TABLET, FILM COATED ORAL EVERY 8 HOURS
Refills: 0 | Status: DISCONTINUED | OUTPATIENT
Start: 2023-01-01 | End: 2023-01-01

## 2023-01-01 RX ORDER — INSULIN GLARGINE 100 [IU]/ML
40 INJECTION, SOLUTION SUBCUTANEOUS EVERY MORNING
Refills: 0 | Status: DISCONTINUED | OUTPATIENT
Start: 2023-01-01 | End: 2023-01-01

## 2023-01-01 RX ORDER — DIGOXIN 250 MCG
125 TABLET ORAL DAILY
Refills: 0 | Status: DISCONTINUED | OUTPATIENT
Start: 2023-01-01 | End: 2023-01-01

## 2023-01-01 RX ORDER — DIAZEPAM 5 MG
0.5 TABLET ORAL
Qty: 0 | Refills: 0 | DISCHARGE

## 2023-01-01 RX ORDER — PROPOFOL 10 MG/ML
20 INJECTION, EMULSION INTRAVENOUS
Qty: 500 | Refills: 0 | Status: DISCONTINUED | OUTPATIENT
Start: 2023-01-01 | End: 2023-01-01

## 2023-01-01 RX ORDER — LIDOCAINE HCL 20 MG/ML
1 VIAL (ML) INJECTION
Qty: 2 | Refills: 0 | Status: DISCONTINUED | OUTPATIENT
Start: 2023-01-01 | End: 2023-01-01

## 2023-01-01 RX ORDER — NALOXONE HYDROCHLORIDE 4 MG/.1ML
1 SPRAY NASAL ONCE
Refills: 0 | Status: DISCONTINUED | OUTPATIENT
Start: 2023-01-01 | End: 2023-01-01

## 2023-01-01 RX ORDER — LANOLIN ALCOHOL/MO/W.PET/CERES
3 CREAM (GRAM) TOPICAL AT BEDTIME
Refills: 0 | Status: DISCONTINUED | OUTPATIENT
Start: 2023-01-01 | End: 2023-01-01

## 2023-01-01 RX ORDER — POTASSIUM CHLORIDE 20 MEQ
40 PACKET (EA) ORAL ONCE
Refills: 0 | Status: COMPLETED | OUTPATIENT
Start: 2023-01-01 | End: 2023-01-01

## 2023-01-01 RX ORDER — DIGOXIN 250 UG/1
250 TABLET ORAL DAILY
Qty: 30 | Refills: 1 | Status: DISCONTINUED | COMMUNITY
Start: 2021-01-27 | End: 2023-01-01

## 2023-01-01 RX ORDER — ASPIRIN/CALCIUM CARB/MAGNESIUM 324 MG
81 TABLET ORAL DAILY
Refills: 0 | Status: DISCONTINUED | OUTPATIENT
Start: 2023-01-01 | End: 2023-01-01

## 2023-01-01 RX ORDER — FUROSEMIDE 40 MG
40 TABLET ORAL
Refills: 0 | Status: DISCONTINUED | OUTPATIENT
Start: 2023-01-01 | End: 2023-01-01

## 2023-01-01 RX ORDER — CLINDAMYCIN HYDROCHLORIDE 300 MG/1
300 CAPSULE ORAL EVERY 6 HOURS
Qty: 28 | Refills: 0 | Status: COMPLETED | COMMUNITY
Start: 2022-10-18 | End: 2023-01-01

## 2023-01-01 RX ORDER — SPIRONOLACTONE 25 MG/1
1 TABLET, FILM COATED ORAL
Qty: 0 | Refills: 0 | DISCHARGE

## 2023-01-01 RX ORDER — PANTOPRAZOLE SODIUM 20 MG/1
40 TABLET, DELAYED RELEASE ORAL
Refills: 0 | Status: DISCONTINUED | OUTPATIENT
Start: 2023-01-01 | End: 2023-01-01

## 2023-01-01 RX ORDER — LEVOTHYROXINE SODIUM 125 MCG
50 TABLET ORAL
Refills: 0 | Status: DISCONTINUED | OUTPATIENT
Start: 2023-01-01 | End: 2023-01-01

## 2023-01-01 RX ORDER — MIDODRINE HYDROCHLORIDE 2.5 MG/1
10 TABLET ORAL
Refills: 0 | Status: DISCONTINUED | OUTPATIENT
Start: 2023-01-01 | End: 2023-01-01

## 2023-01-01 RX ORDER — LEVOTHYROXINE SODIUM 125 MCG
25 TABLET ORAL
Refills: 0 | Status: DISCONTINUED | OUTPATIENT
Start: 2023-01-01 | End: 2023-01-01

## 2023-01-01 RX ORDER — INFLUENZA VIRUS VACCINE 15; 15; 15; 15 UG/.5ML; UG/.5ML; UG/.5ML; UG/.5ML
0.7 SUSPENSION INTRAMUSCULAR ONCE
Refills: 0 | Status: DISCONTINUED | OUTPATIENT
Start: 2023-01-01 | End: 2023-01-01

## 2023-01-01 RX ORDER — FLASH GLUCOSE SENSOR
KIT MISCELLANEOUS
Qty: 6 | Refills: 1 | Status: DISCONTINUED | COMMUNITY
Start: 2018-11-13 | End: 2023-01-01

## 2023-01-01 RX ORDER — CEFEPIME 1 G/1
2000 INJECTION, POWDER, FOR SOLUTION INTRAMUSCULAR; INTRAVENOUS EVERY 24 HOURS
Refills: 0 | Status: DISCONTINUED | OUTPATIENT
Start: 2023-01-01 | End: 2023-01-01

## 2023-01-01 RX ORDER — MAGNESIUM SULFATE 500 MG/ML
2 VIAL (ML) INJECTION EVERY 4 HOURS
Refills: 0 | Status: COMPLETED | OUTPATIENT
Start: 2023-01-01 | End: 2023-01-01

## 2023-01-01 RX ORDER — SPIRONOLACTONE 25 MG/1
0.5 TABLET, FILM COATED ORAL
Qty: 15 | Refills: 0
Start: 2023-01-01 | End: 2023-01-01

## 2023-01-01 RX ORDER — FLUOCINONIDE 0.5 MG/G
0.05 CREAM TOPICAL TWICE DAILY
Qty: 30 | Refills: 0 | Status: DISCONTINUED | COMMUNITY
Start: 2020-07-10 | End: 2023-01-01

## 2023-01-01 RX ORDER — ACETAMINOPHEN 500 MG
1000 TABLET ORAL ONCE
Refills: 0 | Status: COMPLETED | OUTPATIENT
Start: 2023-01-01 | End: 2023-01-01

## 2023-01-01 RX ORDER — HYDROMORPHONE HYDROCHLORIDE 2 MG/ML
0.5 INJECTION INTRAMUSCULAR; INTRAVENOUS; SUBCUTANEOUS
Refills: 0 | Status: DISCONTINUED | OUTPATIENT
Start: 2023-01-01 | End: 2023-01-01

## 2023-01-01 RX ORDER — LIDOCAINE HCL 20 MG/ML
70 VIAL (ML) INJECTION ONCE
Refills: 0 | Status: COMPLETED | OUTPATIENT
Start: 2023-01-01 | End: 2023-01-01

## 2023-01-01 RX ORDER — CEFEPIME 1 G/1
2000 INJECTION, POWDER, FOR SOLUTION INTRAMUSCULAR; INTRAVENOUS EVERY 8 HOURS
Refills: 0 | Status: ACTIVE | OUTPATIENT
Start: 2023-01-01 | End: 2023-12-12

## 2023-01-01 RX ORDER — CEFEPIME 1 G/1
1000 INJECTION, POWDER, FOR SOLUTION INTRAMUSCULAR; INTRAVENOUS ONCE
Refills: 0 | Status: COMPLETED | OUTPATIENT
Start: 2023-01-01 | End: 2023-01-01

## 2023-01-01 RX ORDER — VASOPRESSIN 20 [USP'U]/ML
0.04 INJECTION INTRAVENOUS
Qty: 40 | Refills: 0 | Status: DISCONTINUED | OUTPATIENT
Start: 2023-01-01 | End: 2023-01-01

## 2023-01-01 RX ORDER — GLUCAGON INJECTION, SOLUTION 0.5 MG/.1ML
1 INJECTION, SOLUTION SUBCUTANEOUS ONCE
Refills: 0 | Status: COMPLETED | OUTPATIENT
Start: 2023-01-01

## 2023-01-01 RX ORDER — TAMSULOSIN HYDROCHLORIDE 0.4 MG/1
1 CAPSULE ORAL
Qty: 0 | Refills: 0 | DISCHARGE
Start: 2023-01-01

## 2023-01-01 RX ORDER — MIDODRINE HYDROCHLORIDE 2.5 MG/1
5 TABLET ORAL
Refills: 0 | Status: DISCONTINUED | OUTPATIENT
Start: 2023-01-01 | End: 2023-01-01

## 2023-01-01 RX ORDER — INSULIN GLARGINE 100 [IU]/ML
100 INJECTION, SOLUTION SUBCUTANEOUS DAILY
Qty: 1 | Refills: 3 | Status: ACTIVE | COMMUNITY
Start: 2022-10-26 | End: 1900-01-01

## 2023-01-01 RX ORDER — MUPIROCIN 20 MG/G
2 OINTMENT TOPICAL TWICE DAILY
Qty: 30 | Refills: 0 | Status: DISCONTINUED | COMMUNITY
Start: 2019-05-03 | End: 2023-01-01

## 2023-01-01 RX ORDER — DULOXETINE HYDROCHLORIDE 30 MG/1
90 CAPSULE, DELAYED RELEASE ORAL
Qty: 0 | Refills: 0 | DISCHARGE

## 2023-01-01 RX ORDER — FERROUS SULFATE 325(65) MG
325 TABLET ORAL DAILY
Refills: 0 | Status: DISCONTINUED | OUTPATIENT
Start: 2023-01-01 | End: 2023-01-01

## 2023-01-01 RX ORDER — INSULIN GLARGINE 100 [IU]/ML
12 INJECTION, SOLUTION SUBCUTANEOUS AT BEDTIME
Refills: 0 | Status: DISCONTINUED | OUTPATIENT
Start: 2023-01-01 | End: 2023-01-01

## 2023-01-01 RX ORDER — AMIODARONE HYDROCHLORIDE 400 MG/1
0.5 TABLET ORAL
Qty: 450 | Refills: 0 | Status: DISCONTINUED | OUTPATIENT
Start: 2023-01-01 | End: 2023-01-01

## 2023-01-01 RX ORDER — OXYCODONE AND ACETAMINOPHEN 10; 325 MG/1; MG/1
10-325 TABLET ORAL EVERY 6 HOURS
Qty: 30 | Refills: 0 | Status: ACTIVE | COMMUNITY
Start: 2022-02-24

## 2023-01-01 RX ORDER — QUETIAPINE FUMARATE 200 MG/1
1 TABLET, FILM COATED ORAL
Qty: 30 | Refills: 0
Start: 2023-01-01 | End: 2023-01-01

## 2023-01-01 RX ORDER — VANCOMYCIN HCL 1 G
450 VIAL (EA) INTRAVENOUS EVERY 8 HOURS
Refills: 0 | Status: DISCONTINUED | OUTPATIENT
Start: 2023-01-01 | End: 2023-01-01

## 2023-01-01 RX ORDER — PRASUGREL 10 MG/1
10 TABLET, FILM COATED ORAL
Qty: 90 | Refills: 0 | Status: ACTIVE | COMMUNITY
Start: 2021-02-17

## 2023-01-01 RX ORDER — VANCOMYCIN HCL 1 G
1250 VIAL (EA) INTRAVENOUS EVERY 12 HOURS
Refills: 0 | Status: DISCONTINUED | OUTPATIENT
Start: 2023-01-01 | End: 2023-01-01

## 2023-01-01 RX ORDER — CEFAZOLIN SODIUM 1 G
VIAL (EA) INJECTION
Refills: 0 | Status: DISCONTINUED | OUTPATIENT
Start: 2023-01-01 | End: 2023-01-01

## 2023-01-01 RX ORDER — FUROSEMIDE 40 MG
1 TABLET ORAL
Qty: 60 | Refills: 3
Start: 2023-01-01 | End: 2023-01-01

## 2023-01-01 RX ORDER — MORPHINE SULFATE 50 MG/1
2 CAPSULE, EXTENDED RELEASE ORAL ONCE
Refills: 0 | Status: DISCONTINUED | OUTPATIENT
Start: 2023-01-01 | End: 2023-01-01

## 2023-01-01 RX ORDER — DIAZEPAM 2 MG/1
2 TABLET ORAL
Qty: 15 | Refills: 0 | Status: ACTIVE | COMMUNITY
Start: 2022-09-30

## 2023-01-01 RX ORDER — CLOBETASOL PROPIONATE 0.5 MG/G
0.05 CREAM TOPICAL
Qty: 60 | Refills: 0 | Status: DISCONTINUED | COMMUNITY
Start: 2022-01-01

## 2023-01-01 RX ORDER — BETHANECHOL CHLORIDE 25 MG
1 TABLET ORAL
Refills: 0 | DISCHARGE

## 2023-01-01 RX ORDER — BUMETANIDE 0.25 MG/ML
2 INJECTION INTRAMUSCULAR; INTRAVENOUS EVERY 12 HOURS
Refills: 0 | Status: COMPLETED | OUTPATIENT
Start: 2023-01-01 | End: 2023-01-01

## 2023-01-01 RX ORDER — MORPHINE SULFATE 15 MG/1
15 TABLET ORAL
Qty: 60 | Refills: 0 | Status: DISCONTINUED | COMMUNITY
Start: 2020-11-16 | End: 2023-01-01

## 2023-01-01 RX ORDER — OXYCODONE AND ACETAMINOPHEN 5; 325 MG/1; MG/1
1 TABLET ORAL EVERY 6 HOURS
Refills: 0 | Status: DISCONTINUED | OUTPATIENT
Start: 2023-01-01 | End: 2023-01-01

## 2023-01-01 RX ORDER — FLUCONAZOLE 100 MG/1
100 TABLET ORAL
Qty: 6 | Refills: 0 | Status: COMPLETED | COMMUNITY
Start: 2022-03-17 | End: 2023-01-01

## 2023-01-01 RX ORDER — INSULIN ASPART INJECTION 100 [IU]/ML
100 INJECTION, SOLUTION SUBCUTANEOUS
Qty: 5 | Refills: 0 | Status: DISCONTINUED | COMMUNITY
Start: 2018-11-13 | End: 2023-01-01

## 2023-01-01 RX ORDER — BETHANECHOL CHLORIDE 25 MG
25 TABLET ORAL DAILY
Refills: 0 | Status: DISCONTINUED | OUTPATIENT
Start: 2023-01-01 | End: 2023-01-01

## 2023-01-01 RX ORDER — INSULIN LISPRO 100/ML
10 VIAL (ML) SUBCUTANEOUS
Refills: 0 | Status: DISCONTINUED | OUTPATIENT
Start: 2023-01-01 | End: 2023-01-01

## 2023-01-01 RX ORDER — CHLORHEXIDINE GLUCONATE 213 G/1000ML
1 SOLUTION TOPICAL
Refills: 0 | Status: DISCONTINUED | OUTPATIENT
Start: 2023-01-01 | End: 2023-01-01

## 2023-01-01 RX ORDER — INSULIN ASPART 100 [IU]/ML
100 INJECTION, SOLUTION INTRAVENOUS; SUBCUTANEOUS
Qty: 10 | Refills: 3 | Status: DISCONTINUED | COMMUNITY
Start: 2022-03-30 | End: 2023-01-01

## 2023-01-01 RX ORDER — CHLORHEXIDINE GLUCONATE 213 G/1000ML
1 SOLUTION TOPICAL DAILY
Refills: 0 | Status: DISCONTINUED | OUTPATIENT
Start: 2023-01-01 | End: 2023-01-01

## 2023-01-01 RX ORDER — OXYCODONE AND ACETAMINOPHEN 5; 325 MG/1; MG/1
1 TABLET ORAL
Refills: 0 | DISCHARGE

## 2023-01-01 RX ORDER — CEFTRIAXONE 500 MG/1
2000 INJECTION, POWDER, FOR SOLUTION INTRAMUSCULAR; INTRAVENOUS EVERY 24 HOURS
Refills: 0 | Status: DISCONTINUED | OUTPATIENT
Start: 2023-01-01 | End: 2023-01-01

## 2023-01-01 RX ORDER — QUETIAPINE FUMARATE 200 MG/1
25 TABLET, FILM COATED ORAL EVERY 8 HOURS
Refills: 0 | Status: DISCONTINUED | OUTPATIENT
Start: 2023-01-01 | End: 2023-01-01

## 2023-01-01 RX ORDER — PROCAINAMIDE HCL 500 MG
4 TABLET, EXTENDED RELEASE ORAL
Qty: 2000 | Refills: 0 | Status: DISCONTINUED | OUTPATIENT
Start: 2023-01-01 | End: 2023-01-01

## 2023-01-01 RX ORDER — SODIUM CHLORIDE 9 MG/ML
1000 INJECTION INTRAMUSCULAR; INTRAVENOUS; SUBCUTANEOUS ONCE
Refills: 0 | Status: DISCONTINUED | OUTPATIENT
Start: 2023-01-01 | End: 2023-01-01

## 2023-01-01 RX ORDER — CEFEPIME 1 G/1
2000 INJECTION, POWDER, FOR SOLUTION INTRAMUSCULAR; INTRAVENOUS EVERY 12 HOURS
Refills: 0 | Status: DISCONTINUED | OUTPATIENT
Start: 2023-01-01 | End: 2023-01-01

## 2023-01-01 RX ORDER — DIAZEPAM 5 MG
2 TABLET ORAL AT BEDTIME
Refills: 0 | Status: DISCONTINUED | OUTPATIENT
Start: 2023-01-01 | End: 2023-01-01

## 2023-01-01 RX ORDER — DIAZEPAM 5 MG
2 TABLET ORAL
Refills: 0 | Status: ACTIVE | OUTPATIENT
Start: 2023-01-01 | End: 2023-01-01

## 2023-01-01 RX ORDER — MORPHINE SULFATE 30 MG/1
30 TABLET, FILM COATED, EXTENDED RELEASE ORAL
Qty: 90 | Refills: 0 | Status: DISCONTINUED | COMMUNITY
Start: 2020-11-16 | End: 2023-01-01

## 2023-01-01 RX ORDER — METHOTREXATE 2.5 MG/1
2.5 TABLET ORAL
Qty: 73 | Refills: 0 | Status: ACTIVE | COMMUNITY
Start: 2023-01-01 | End: 1900-01-01

## 2023-01-01 RX ORDER — INSULIN GLARGINE 100 [IU]/ML
20 INJECTION, SOLUTION SUBCUTANEOUS AT BEDTIME
Refills: 0 | Status: DISCONTINUED | OUTPATIENT
Start: 2023-01-01 | End: 2023-01-01

## 2023-01-01 RX ORDER — CEFEPIME 1 G/1
2000 INJECTION, POWDER, FOR SOLUTION INTRAMUSCULAR; INTRAVENOUS EVERY 8 HOURS
Refills: 0 | Status: DISCONTINUED | OUTPATIENT
Start: 2023-01-01 | End: 2023-01-01

## 2023-01-01 RX ORDER — INSULIN LISPRO 100/ML
1 VIAL (ML) SUBCUTANEOUS
Qty: 0 | Refills: 0 | DISCHARGE
Start: 2023-01-01

## 2023-01-01 RX ORDER — SODIUM CHLORIDE 9 MG/ML
250 INJECTION, SOLUTION INTRAVENOUS ONCE
Refills: 0 | Status: COMPLETED | OUTPATIENT
Start: 2023-01-01 | End: 2023-01-01

## 2023-01-01 RX ORDER — CEPHALEXIN 500 MG/1
500 CAPSULE ORAL
Qty: 21 | Refills: 0 | Status: DISCONTINUED | COMMUNITY
Start: 2022-11-04

## 2023-01-01 RX ORDER — SPIRONOLACTONE 25 MG/1
12.5 TABLET, FILM COATED ORAL DAILY
Refills: 0 | Status: DISCONTINUED | OUTPATIENT
Start: 2023-01-01 | End: 2023-01-01

## 2023-01-01 RX ORDER — MIDODRINE HYDROCHLORIDE 2.5 MG/1
5 TABLET ORAL THREE TIMES A DAY
Refills: 0 | Status: DISCONTINUED | OUTPATIENT
Start: 2023-01-01 | End: 2023-01-01

## 2023-01-01 RX ORDER — OXYCODONE HYDROCHLORIDE 5 MG/1
10 TABLET ORAL EVERY 6 HOURS
Refills: 0 | Status: DISCONTINUED | OUTPATIENT
Start: 2023-01-01 | End: 2023-01-01

## 2023-01-01 RX ORDER — ONDANSETRON 4 MG/1
4 TABLET, ORALLY DISINTEGRATING ORAL
Qty: 90 | Refills: 0 | Status: DISCONTINUED | COMMUNITY
Start: 2023-01-01

## 2023-01-01 RX ORDER — HALOPERIDOL DECANOATE 100 MG/ML
3 INJECTION INTRAMUSCULAR ONCE
Refills: 0 | Status: COMPLETED | OUTPATIENT
Start: 2023-01-01 | End: 2023-01-01

## 2023-01-01 RX ORDER — CIPROFLOXACIN HYDROCHLORIDE 250 MG/1
250 TABLET, FILM COATED ORAL
Qty: 20 | Refills: 0 | Status: DISCONTINUED | COMMUNITY
Start: 2022-02-21

## 2023-01-01 RX ORDER — INSULIN LISPRO 100/ML
30 VIAL (ML) SUBCUTANEOUS
Qty: 0 | Refills: 0 | DISCHARGE

## 2023-01-01 RX ORDER — DIAZEPAM 5 MG
1 TABLET ORAL
Qty: 0 | Refills: 0 | DISCHARGE

## 2023-01-01 RX ORDER — ACETAMINOPHEN 500 MG
325 TABLET ORAL EVERY 4 HOURS
Refills: 0 | Status: DISCONTINUED | OUTPATIENT
Start: 2023-01-01 | End: 2023-01-01

## 2023-01-01 RX ORDER — VANCOMYCIN HCL 1 G
1250 VIAL (EA) INTRAVENOUS EVERY 24 HOURS
Refills: 0 | Status: DISCONTINUED | OUTPATIENT
Start: 2023-01-01 | End: 2023-01-01

## 2023-01-01 RX ORDER — MIDODRINE HYDROCHLORIDE 2.5 MG/1
1 TABLET ORAL
Qty: 0 | Refills: 0 | DISCHARGE
Start: 2023-01-01

## 2023-01-01 RX ORDER — SODIUM BICARBONATE 1 MEQ/ML
0.45 SYRINGE (ML) INTRAVENOUS
Qty: 150 | Refills: 0 | Status: DISCONTINUED | OUTPATIENT
Start: 2023-01-01 | End: 2023-01-01

## 2023-01-01 RX ORDER — MUPIROCIN 20 MG/G
1 OINTMENT TOPICAL
Refills: 0 | Status: DISCONTINUED | OUTPATIENT
Start: 2023-01-01 | End: 2023-01-01

## 2023-01-01 RX ORDER — FOLIC ACID 0.8 MG
1 TABLET ORAL
Refills: 0 | Status: DISCONTINUED | OUTPATIENT
Start: 2023-01-01 | End: 2023-01-01

## 2023-01-01 RX ADMIN — ENOXAPARIN SODIUM 40 MILLIGRAM(S): 100 INJECTION SUBCUTANEOUS at 22:30

## 2023-01-01 RX ADMIN — SODIUM CHLORIDE 75 MILLILITER(S): 9 INJECTION, SOLUTION INTRAVENOUS at 16:57

## 2023-01-01 RX ADMIN — GABAPENTIN 100 MILLIGRAM(S): 400 CAPSULE ORAL at 06:02

## 2023-01-01 RX ADMIN — Medication 81 MILLIGRAM(S): at 12:30

## 2023-01-01 RX ADMIN — Medication 81 MILLIGRAM(S): at 13:08

## 2023-01-01 RX ADMIN — Medication 1 APPLICATION(S): at 17:03

## 2023-01-01 RX ADMIN — INSULIN GLARGINE 8 UNIT(S): 100 INJECTION, SOLUTION SUBCUTANEOUS at 08:50

## 2023-01-01 RX ADMIN — HEPARIN SODIUM 5000 UNIT(S): 5000 INJECTION INTRAVENOUS; SUBCUTANEOUS at 17:39

## 2023-01-01 RX ADMIN — ENOXAPARIN SODIUM 30 MILLIGRAM(S): 100 INJECTION SUBCUTANEOUS at 05:21

## 2023-01-01 RX ADMIN — Medication 81 MILLIGRAM(S): at 11:48

## 2023-01-01 RX ADMIN — Medication 25 MICROGRAM(S): at 05:13

## 2023-01-01 RX ADMIN — HEPARIN SODIUM 5000 UNIT(S): 5000 INJECTION INTRAVENOUS; SUBCUTANEOUS at 06:14

## 2023-01-01 RX ADMIN — Medication 100 MILLIGRAM(S): at 06:09

## 2023-01-01 RX ADMIN — Medication 25 MICROGRAM(S): at 05:17

## 2023-01-01 RX ADMIN — Medication 1 APPLICATION(S): at 06:27

## 2023-01-01 RX ADMIN — Medication 1 MILLIGRAM(S): at 11:32

## 2023-01-01 RX ADMIN — ATORVASTATIN CALCIUM 80 MILLIGRAM(S): 80 TABLET, FILM COATED ORAL at 00:51

## 2023-01-01 RX ADMIN — Medication 25 MICROGRAM(S): at 05:25

## 2023-01-01 RX ADMIN — BUMETANIDE 2 MILLIGRAM(S): 0.25 INJECTION INTRAMUSCULAR; INTRAVENOUS at 17:54

## 2023-01-01 RX ADMIN — Medication 1 APPLICATION(S): at 16:38

## 2023-01-01 RX ADMIN — Medication 1 MILLIGRAM(S): at 21:42

## 2023-01-01 RX ADMIN — Medication 2: at 17:18

## 2023-01-01 RX ADMIN — PANTOPRAZOLE SODIUM 40 MILLIGRAM(S): 20 TABLET, DELAYED RELEASE ORAL at 06:17

## 2023-01-01 RX ADMIN — Medication 1: at 07:38

## 2023-01-01 RX ADMIN — DULOXETINE HYDROCHLORIDE 30 MILLIGRAM(S): 30 CAPSULE, DELAYED RELEASE ORAL at 06:16

## 2023-01-01 RX ADMIN — MIDODRINE HYDROCHLORIDE 5 MILLIGRAM(S): 2.5 TABLET ORAL at 17:25

## 2023-01-01 RX ADMIN — POLYETHYLENE GLYCOL 3350 17 GRAM(S): 17 POWDER, FOR SOLUTION ORAL at 05:52

## 2023-01-01 RX ADMIN — POLYETHYLENE GLYCOL 3350 17 GRAM(S): 17 POWDER, FOR SOLUTION ORAL at 05:30

## 2023-01-01 RX ADMIN — DULOXETINE HYDROCHLORIDE 30 MILLIGRAM(S): 30 CAPSULE, DELAYED RELEASE ORAL at 21:44

## 2023-01-01 RX ADMIN — ENOXAPARIN SODIUM 40 MILLIGRAM(S): 100 INJECTION SUBCUTANEOUS at 22:42

## 2023-01-01 RX ADMIN — Medication 1 APPLICATION(S): at 06:01

## 2023-01-01 RX ADMIN — Medication 40 MILLIGRAM(S): at 06:13

## 2023-01-01 RX ADMIN — QUETIAPINE FUMARATE 25 MILLIGRAM(S): 200 TABLET, FILM COATED ORAL at 12:27

## 2023-01-01 RX ADMIN — POLYETHYLENE GLYCOL 3350 17 GRAM(S): 17 POWDER, FOR SOLUTION ORAL at 18:14

## 2023-01-01 RX ADMIN — DULOXETINE HYDROCHLORIDE 30 MILLIGRAM(S): 30 CAPSULE, DELAYED RELEASE ORAL at 15:00

## 2023-01-01 RX ADMIN — ONDANSETRON 4 MILLIGRAM(S): 8 TABLET, FILM COATED ORAL at 04:17

## 2023-01-01 RX ADMIN — CHLORHEXIDINE GLUCONATE 1 APPLICATION(S): 213 SOLUTION TOPICAL at 06:04

## 2023-01-01 RX ADMIN — POLYETHYLENE GLYCOL 3350 17 GRAM(S): 17 POWDER, FOR SOLUTION ORAL at 05:26

## 2023-01-01 RX ADMIN — DULOXETINE HYDROCHLORIDE 30 MILLIGRAM(S): 30 CAPSULE, DELAYED RELEASE ORAL at 22:21

## 2023-01-01 RX ADMIN — Medication 40 MILLIGRAM(S): at 20:26

## 2023-01-01 RX ADMIN — Medication 81 MILLIGRAM(S): at 12:20

## 2023-01-01 RX ADMIN — METHOTREXATE 10 MILLIGRAM(S): 2.5 TABLET ORAL at 12:27

## 2023-01-01 RX ADMIN — DULOXETINE HYDROCHLORIDE 30 MILLIGRAM(S): 30 CAPSULE, DELAYED RELEASE ORAL at 11:04

## 2023-01-01 RX ADMIN — HEPARIN SODIUM 5000 UNIT(S): 5000 INJECTION INTRAVENOUS; SUBCUTANEOUS at 05:27

## 2023-01-01 RX ADMIN — HEPARIN SODIUM 5000 UNIT(S): 5000 INJECTION INTRAVENOUS; SUBCUTANEOUS at 17:20

## 2023-01-01 RX ADMIN — Medication 70 MILLIGRAM(S): at 20:35

## 2023-01-01 RX ADMIN — PANTOPRAZOLE SODIUM 40 MILLIGRAM(S): 20 TABLET, DELAYED RELEASE ORAL at 05:09

## 2023-01-01 RX ADMIN — PANTOPRAZOLE SODIUM 40 MILLIGRAM(S): 20 TABLET, DELAYED RELEASE ORAL at 06:28

## 2023-01-01 RX ADMIN — INSULIN GLARGINE 5 UNIT(S): 100 INJECTION, SOLUTION SUBCUTANEOUS at 22:10

## 2023-01-01 RX ADMIN — HEPARIN SODIUM 5000 UNIT(S): 5000 INJECTION INTRAVENOUS; SUBCUTANEOUS at 05:31

## 2023-01-01 RX ADMIN — Medication 1 APPLICATION(S): at 18:10

## 2023-01-01 RX ADMIN — OXYCODONE HYDROCHLORIDE 10 MILLIGRAM(S): 5 TABLET ORAL at 06:00

## 2023-01-01 RX ADMIN — Medication 1: at 08:51

## 2023-01-01 RX ADMIN — IRON SUCROSE 110 MILLIGRAM(S): 20 INJECTION, SOLUTION INTRAVENOUS at 14:55

## 2023-01-01 RX ADMIN — PRASUGREL 10 MILLIGRAM(S): 5 TABLET, FILM COATED ORAL at 11:18

## 2023-01-01 RX ADMIN — Medication 50 MILLILITER(S): at 01:12

## 2023-01-01 RX ADMIN — DULOXETINE HYDROCHLORIDE 30 MILLIGRAM(S): 30 CAPSULE, DELAYED RELEASE ORAL at 12:46

## 2023-01-01 RX ADMIN — Medication 1 APPLICATION(S): at 17:28

## 2023-01-01 RX ADMIN — Medication 25 MILLILITER(S): at 21:00

## 2023-01-01 RX ADMIN — DULOXETINE HYDROCHLORIDE 30 MILLIGRAM(S): 30 CAPSULE, DELAYED RELEASE ORAL at 13:18

## 2023-01-01 RX ADMIN — CEFEPIME 100 MILLIGRAM(S): 1 INJECTION, POWDER, FOR SOLUTION INTRAMUSCULAR; INTRAVENOUS at 13:18

## 2023-01-01 RX ADMIN — POLYETHYLENE GLYCOL 3350 17 GRAM(S): 17 POWDER, FOR SOLUTION ORAL at 06:26

## 2023-01-01 RX ADMIN — DULOXETINE HYDROCHLORIDE 30 MILLIGRAM(S): 30 CAPSULE, DELAYED RELEASE ORAL at 05:17

## 2023-01-01 RX ADMIN — CHLORHEXIDINE GLUCONATE 1 APPLICATION(S): 213 SOLUTION TOPICAL at 11:32

## 2023-01-01 RX ADMIN — DULOXETINE HYDROCHLORIDE 30 MILLIGRAM(S): 30 CAPSULE, DELAYED RELEASE ORAL at 21:09

## 2023-01-01 RX ADMIN — Medication 40 MILLIGRAM(S): at 13:40

## 2023-01-01 RX ADMIN — CHLORHEXIDINE GLUCONATE 1 APPLICATION(S): 213 SOLUTION TOPICAL at 05:04

## 2023-01-01 RX ADMIN — DULOXETINE HYDROCHLORIDE 30 MILLIGRAM(S): 30 CAPSULE, DELAYED RELEASE ORAL at 21:03

## 2023-01-01 RX ADMIN — Medication 25 MILLIGRAM(S): at 05:21

## 2023-01-01 RX ADMIN — MUPIROCIN 1 APPLICATION(S): 20 OINTMENT TOPICAL at 16:37

## 2023-01-01 RX ADMIN — Medication 1 APPLICATION(S): at 06:43

## 2023-01-01 RX ADMIN — PRASUGREL 10 MILLIGRAM(S): 5 TABLET, FILM COATED ORAL at 12:28

## 2023-01-01 RX ADMIN — OXYCODONE HYDROCHLORIDE 5 MILLIGRAM(S): 5 TABLET ORAL at 03:44

## 2023-01-01 RX ADMIN — CHLORHEXIDINE GLUCONATE 1 APPLICATION(S): 213 SOLUTION TOPICAL at 12:29

## 2023-01-01 RX ADMIN — Medication 1 APPLICATION(S): at 18:29

## 2023-01-01 RX ADMIN — ENOXAPARIN SODIUM 40 MILLIGRAM(S): 100 INJECTION SUBCUTANEOUS at 21:09

## 2023-01-01 RX ADMIN — DULOXETINE HYDROCHLORIDE 30 MILLIGRAM(S): 30 CAPSULE, DELAYED RELEASE ORAL at 21:16

## 2023-01-01 RX ADMIN — Medication 40 MILLIGRAM(S): at 05:45

## 2023-01-01 RX ADMIN — PRASUGREL 10 MILLIGRAM(S): 5 TABLET, FILM COATED ORAL at 13:14

## 2023-01-01 RX ADMIN — PRASUGREL 10 MILLIGRAM(S): 5 TABLET, FILM COATED ORAL at 11:37

## 2023-01-01 RX ADMIN — PRASUGREL 10 MILLIGRAM(S): 5 TABLET, FILM COATED ORAL at 12:46

## 2023-01-01 RX ADMIN — Medication 1 APPLICATION(S): at 17:50

## 2023-01-01 RX ADMIN — ENOXAPARIN SODIUM 40 MILLIGRAM(S): 100 INJECTION SUBCUTANEOUS at 21:42

## 2023-01-01 RX ADMIN — Medication 166.67 MILLIGRAM(S): at 15:45

## 2023-01-01 RX ADMIN — Medication 1 APPLICATION(S): at 17:27

## 2023-01-01 RX ADMIN — OXYCODONE HYDROCHLORIDE 10 MILLIGRAM(S): 5 TABLET ORAL at 16:03

## 2023-01-01 RX ADMIN — HEPARIN SODIUM 5000 UNIT(S): 5000 INJECTION INTRAVENOUS; SUBCUTANEOUS at 17:54

## 2023-01-01 RX ADMIN — Medication 1 APPLICATION(S): at 17:08

## 2023-01-01 RX ADMIN — DULOXETINE HYDROCHLORIDE 30 MILLIGRAM(S): 30 CAPSULE, DELAYED RELEASE ORAL at 05:52

## 2023-01-01 RX ADMIN — Medication 40 MILLIGRAM(S): at 13:16

## 2023-01-01 RX ADMIN — Medication 40 MILLIGRAM(S): at 14:31

## 2023-01-01 RX ADMIN — Medication 1 MILLIGRAM(S): at 09:14

## 2023-01-01 RX ADMIN — MIDODRINE HYDROCHLORIDE 5 MILLIGRAM(S): 2.5 TABLET ORAL at 05:21

## 2023-01-01 RX ADMIN — Medication 81 MILLIGRAM(S): at 12:50

## 2023-01-01 RX ADMIN — SENNA PLUS 2 TABLET(S): 8.6 TABLET ORAL at 22:11

## 2023-01-01 RX ADMIN — Medication 1 APPLICATION(S): at 16:42

## 2023-01-01 RX ADMIN — PANTOPRAZOLE SODIUM 40 MILLIGRAM(S): 20 TABLET, DELAYED RELEASE ORAL at 06:14

## 2023-01-01 RX ADMIN — MUPIROCIN 1 APPLICATION(S): 20 OINTMENT TOPICAL at 06:46

## 2023-01-01 RX ADMIN — Medication 1 APPLICATION(S): at 17:32

## 2023-01-01 RX ADMIN — Medication 1 APPLICATION(S): at 17:05

## 2023-01-01 RX ADMIN — MIDODRINE HYDROCHLORIDE 10 MILLIGRAM(S): 2.5 TABLET ORAL at 14:15

## 2023-01-01 RX ADMIN — LACTULOSE 10 GRAM(S): 10 SOLUTION ORAL at 21:58

## 2023-01-01 RX ADMIN — Medication 2: at 11:44

## 2023-01-01 RX ADMIN — HEPARIN SODIUM 5000 UNIT(S): 5000 INJECTION INTRAVENOUS; SUBCUTANEOUS at 17:01

## 2023-01-01 RX ADMIN — Medication 1 APPLICATION(S): at 06:48

## 2023-01-01 RX ADMIN — DULOXETINE HYDROCHLORIDE 30 MILLIGRAM(S): 30 CAPSULE, DELAYED RELEASE ORAL at 13:08

## 2023-01-01 RX ADMIN — Medication 25 MICROGRAM(S): at 06:45

## 2023-01-01 RX ADMIN — Medication 81 MILLIGRAM(S): at 11:57

## 2023-01-01 RX ADMIN — SODIUM CHLORIDE 1000 MILLILITER(S): 9 INJECTION, SOLUTION INTRAVENOUS at 16:27

## 2023-01-01 RX ADMIN — Medication 25 MILLIGRAM(S): at 06:14

## 2023-01-01 RX ADMIN — Medication 20 MILLIGRAM(S): at 13:27

## 2023-01-01 RX ADMIN — Medication 1 MILLIGRAM(S): at 07:00

## 2023-01-01 RX ADMIN — Medication 1: at 17:00

## 2023-01-01 RX ADMIN — MIDODRINE HYDROCHLORIDE 5 MILLIGRAM(S): 2.5 TABLET ORAL at 18:14

## 2023-01-01 RX ADMIN — MUPIROCIN 1 APPLICATION(S): 20 OINTMENT TOPICAL at 06:12

## 2023-01-01 RX ADMIN — INSULIN GLARGINE 12 UNIT(S): 100 INJECTION, SOLUTION SUBCUTANEOUS at 22:35

## 2023-01-01 RX ADMIN — Medication 1 APPLICATION(S): at 07:08

## 2023-01-01 RX ADMIN — GABAPENTIN 100 MILLIGRAM(S): 400 CAPSULE ORAL at 06:30

## 2023-01-01 RX ADMIN — PANTOPRAZOLE SODIUM 40 MILLIGRAM(S): 20 TABLET, DELAYED RELEASE ORAL at 05:46

## 2023-01-01 RX ADMIN — Medication 1 APPLICATION(S): at 17:11

## 2023-01-01 RX ADMIN — Medication 25 MICROGRAM(S): at 06:21

## 2023-01-01 RX ADMIN — DULOXETINE HYDROCHLORIDE 30 MILLIGRAM(S): 30 CAPSULE, DELAYED RELEASE ORAL at 06:27

## 2023-01-01 RX ADMIN — PANTOPRAZOLE SODIUM 40 MILLIGRAM(S): 20 TABLET, DELAYED RELEASE ORAL at 05:17

## 2023-01-01 RX ADMIN — CHLORHEXIDINE GLUCONATE 1 APPLICATION(S): 213 SOLUTION TOPICAL at 05:49

## 2023-01-01 RX ADMIN — DULOXETINE HYDROCHLORIDE 30 MILLIGRAM(S): 30 CAPSULE, DELAYED RELEASE ORAL at 20:56

## 2023-01-01 RX ADMIN — Medication 25 MICROGRAM(S): at 05:45

## 2023-01-01 RX ADMIN — LACTULOSE 10 GRAM(S): 10 SOLUTION ORAL at 21:32

## 2023-01-01 RX ADMIN — AMIODARONE HYDROCHLORIDE 33.3 MG/MIN: 400 TABLET ORAL at 19:05

## 2023-01-01 RX ADMIN — Medication 1 APPLICATION(S): at 18:26

## 2023-01-01 RX ADMIN — HEPARIN SODIUM 5000 UNIT(S): 5000 INJECTION INTRAVENOUS; SUBCUTANEOUS at 05:57

## 2023-01-01 RX ADMIN — Medication 4: at 09:18

## 2023-01-01 RX ADMIN — MIDODRINE HYDROCHLORIDE 10 MILLIGRAM(S): 2.5 TABLET ORAL at 05:34

## 2023-01-01 RX ADMIN — DULOXETINE HYDROCHLORIDE 30 MILLIGRAM(S): 30 CAPSULE, DELAYED RELEASE ORAL at 06:13

## 2023-01-01 RX ADMIN — CHLORHEXIDINE GLUCONATE 1 APPLICATION(S): 213 SOLUTION TOPICAL at 12:28

## 2023-01-01 RX ADMIN — DULOXETINE HYDROCHLORIDE 30 MILLIGRAM(S): 30 CAPSULE, DELAYED RELEASE ORAL at 11:21

## 2023-01-01 RX ADMIN — Medication 1 MILLIGRAM(S): at 21:46

## 2023-01-01 RX ADMIN — Medication 25 MILLIGRAM(S): at 05:22

## 2023-01-01 RX ADMIN — SENNA PLUS 2 TABLET(S): 8.6 TABLET ORAL at 22:35

## 2023-01-01 RX ADMIN — Medication 1 TABLET(S): at 11:59

## 2023-01-01 RX ADMIN — Medication 40 MILLIGRAM(S): at 20:04

## 2023-01-01 RX ADMIN — ATORVASTATIN CALCIUM 80 MILLIGRAM(S): 80 TABLET, FILM COATED ORAL at 21:09

## 2023-01-01 RX ADMIN — SENNA PLUS 2 TABLET(S): 8.6 TABLET ORAL at 23:11

## 2023-01-01 RX ADMIN — Medication 25 MILLIGRAM(S): at 05:39

## 2023-01-01 RX ADMIN — INSULIN GLARGINE 12 UNIT(S): 100 INJECTION, SOLUTION SUBCUTANEOUS at 21:26

## 2023-01-01 RX ADMIN — SENNA PLUS 2 TABLET(S): 8.6 TABLET ORAL at 21:03

## 2023-01-01 RX ADMIN — Medication 1 MILLIGRAM(S): at 11:13

## 2023-01-01 RX ADMIN — Medication 20 MILLIGRAM(S): at 05:51

## 2023-01-01 RX ADMIN — SODIUM CHLORIDE 250 MILLILITER(S): 9 INJECTION INTRAMUSCULAR; INTRAVENOUS; SUBCUTANEOUS at 14:15

## 2023-01-01 RX ADMIN — PANTOPRAZOLE SODIUM 40 MILLIGRAM(S): 20 TABLET, DELAYED RELEASE ORAL at 06:13

## 2023-01-01 RX ADMIN — Medication 25 MICROGRAM(S): at 05:34

## 2023-01-01 RX ADMIN — MUPIROCIN 1 APPLICATION(S): 20 OINTMENT TOPICAL at 17:06

## 2023-01-01 RX ADMIN — DULOXETINE HYDROCHLORIDE 30 MILLIGRAM(S): 30 CAPSULE, DELAYED RELEASE ORAL at 06:05

## 2023-01-01 RX ADMIN — CEFEPIME 100 MILLIGRAM(S): 1 INJECTION, POWDER, FOR SOLUTION INTRAMUSCULAR; INTRAVENOUS at 21:44

## 2023-01-01 RX ADMIN — ENOXAPARIN SODIUM 40 MILLIGRAM(S): 100 INJECTION SUBCUTANEOUS at 21:00

## 2023-01-01 RX ADMIN — CHLORHEXIDINE GLUCONATE 15 MILLILITER(S): 213 SOLUTION TOPICAL at 17:28

## 2023-01-01 RX ADMIN — Medication 25 MICROGRAM(S): at 05:46

## 2023-01-01 RX ADMIN — HEPARIN SODIUM 5000 UNIT(S): 5000 INJECTION INTRAVENOUS; SUBCUTANEOUS at 17:09

## 2023-01-01 RX ADMIN — SODIUM CHLORIDE 500 MILLILITER(S): 9 INJECTION INTRAMUSCULAR; INTRAVENOUS; SUBCUTANEOUS at 19:57

## 2023-01-01 RX ADMIN — INSULIN GLARGINE 12 UNIT(S): 100 INJECTION, SOLUTION SUBCUTANEOUS at 08:07

## 2023-01-01 RX ADMIN — Medication 1 APPLICATION(S): at 06:17

## 2023-01-01 RX ADMIN — DULOXETINE HYDROCHLORIDE 30 MILLIGRAM(S): 30 CAPSULE, DELAYED RELEASE ORAL at 11:54

## 2023-01-01 RX ADMIN — HEPARIN SODIUM 5000 UNIT(S): 5000 INJECTION INTRAVENOUS; SUBCUTANEOUS at 06:08

## 2023-01-01 RX ADMIN — CEFTRIAXONE 100 MILLIGRAM(S): 500 INJECTION, POWDER, FOR SOLUTION INTRAMUSCULAR; INTRAVENOUS at 05:02

## 2023-01-01 RX ADMIN — CHLORHEXIDINE GLUCONATE 15 MILLILITER(S): 213 SOLUTION TOPICAL at 05:30

## 2023-01-01 RX ADMIN — HEPARIN SODIUM 5000 UNIT(S): 5000 INJECTION INTRAVENOUS; SUBCUTANEOUS at 18:16

## 2023-01-01 RX ADMIN — Medication 81 MILLIGRAM(S): at 12:22

## 2023-01-01 RX ADMIN — Medication 1 APPLICATION(S): at 17:37

## 2023-01-01 RX ADMIN — DULOXETINE HYDROCHLORIDE 30 MILLIGRAM(S): 30 CAPSULE, DELAYED RELEASE ORAL at 21:45

## 2023-01-01 RX ADMIN — CEFEPIME 100 MILLIGRAM(S): 1 INJECTION, POWDER, FOR SOLUTION INTRAMUSCULAR; INTRAVENOUS at 13:25

## 2023-01-01 RX ADMIN — Medication 25 MICROGRAM(S): at 06:13

## 2023-01-01 RX ADMIN — Medication 1 MILLIGRAM(S): at 23:07

## 2023-01-01 RX ADMIN — INSULIN GLARGINE 10 UNIT(S): 100 INJECTION, SOLUTION SUBCUTANEOUS at 21:43

## 2023-01-01 RX ADMIN — Medication 81 MILLIGRAM(S): at 12:07

## 2023-01-01 RX ADMIN — OXYCODONE HYDROCHLORIDE 10 MILLIGRAM(S): 5 TABLET ORAL at 22:50

## 2023-01-01 RX ADMIN — Medication 1 TABLET(S): at 11:08

## 2023-01-01 RX ADMIN — QUETIAPINE FUMARATE 25 MILLIGRAM(S): 200 TABLET, FILM COATED ORAL at 14:22

## 2023-01-01 RX ADMIN — PRASUGREL 10 MILLIGRAM(S): 5 TABLET, FILM COATED ORAL at 11:46

## 2023-01-01 RX ADMIN — BUMETANIDE 2 MILLIGRAM(S): 0.25 INJECTION INTRAMUSCULAR; INTRAVENOUS at 18:47

## 2023-01-01 RX ADMIN — PANTOPRAZOLE SODIUM 40 MILLIGRAM(S): 20 TABLET, DELAYED RELEASE ORAL at 05:57

## 2023-01-01 RX ADMIN — Medication 1 TABLET(S): at 13:08

## 2023-01-01 RX ADMIN — Medication 1: at 18:12

## 2023-01-01 RX ADMIN — TAMSULOSIN HYDROCHLORIDE 0.4 MILLIGRAM(S): 0.4 CAPSULE ORAL at 21:08

## 2023-01-01 RX ADMIN — QUETIAPINE FUMARATE 50 MILLIGRAM(S): 200 TABLET, FILM COATED ORAL at 21:08

## 2023-01-01 RX ADMIN — Medication 250 MICROGRAM(S): at 05:55

## 2023-01-01 RX ADMIN — SENNA PLUS 2 TABLET(S): 8.6 TABLET ORAL at 21:15

## 2023-01-01 RX ADMIN — HEPARIN SODIUM 5000 UNIT(S): 5000 INJECTION INTRAVENOUS; SUBCUTANEOUS at 17:08

## 2023-01-01 RX ADMIN — Medication 325 MILLIGRAM(S): at 11:28

## 2023-01-01 RX ADMIN — OXYCODONE HYDROCHLORIDE 10 MILLIGRAM(S): 5 TABLET ORAL at 17:13

## 2023-01-01 RX ADMIN — Medication 1: at 16:36

## 2023-01-01 RX ADMIN — Medication 50 MICROGRAM(S): at 06:32

## 2023-01-01 RX ADMIN — Medication 20 MILLIGRAM(S): at 05:31

## 2023-01-01 RX ADMIN — MIDODRINE HYDROCHLORIDE 10 MILLIGRAM(S): 2.5 TABLET ORAL at 12:27

## 2023-01-01 RX ADMIN — HALOPERIDOL DECANOATE 2 MILLIGRAM(S): 100 INJECTION INTRAMUSCULAR at 11:22

## 2023-01-01 RX ADMIN — SODIUM CHLORIDE 50 MILLILITER(S): 5 INJECTION, SOLUTION INTRAVENOUS at 18:12

## 2023-01-01 RX ADMIN — Medication 40 MILLIGRAM(S): at 13:55

## 2023-01-01 RX ADMIN — MEROPENEM 280 MILLIGRAM(S): 1 INJECTION INTRAVENOUS at 17:10

## 2023-01-01 RX ADMIN — HEPARIN SODIUM 5000 UNIT(S): 5000 INJECTION INTRAVENOUS; SUBCUTANEOUS at 17:11

## 2023-01-01 RX ADMIN — OXYCODONE HYDROCHLORIDE 10 MILLIGRAM(S): 5 TABLET ORAL at 13:41

## 2023-01-01 RX ADMIN — Medication 2 MILLIGRAM(S): at 01:52

## 2023-01-01 RX ADMIN — CEFTRIAXONE 100 MILLIGRAM(S): 500 INJECTION, POWDER, FOR SOLUTION INTRAMUSCULAR; INTRAVENOUS at 05:46

## 2023-01-01 RX ADMIN — PANTOPRAZOLE SODIUM 40 MILLIGRAM(S): 20 TABLET, DELAYED RELEASE ORAL at 06:24

## 2023-01-01 RX ADMIN — PRASUGREL 10 MILLIGRAM(S): 5 TABLET, FILM COATED ORAL at 12:38

## 2023-01-01 RX ADMIN — Medication 250 MICROGRAM(S): at 05:58

## 2023-01-01 RX ADMIN — QUETIAPINE FUMARATE 50 MILLIGRAM(S): 200 TABLET, FILM COATED ORAL at 21:00

## 2023-01-01 RX ADMIN — DULOXETINE HYDROCHLORIDE 30 MILLIGRAM(S): 30 CAPSULE, DELAYED RELEASE ORAL at 11:06

## 2023-01-01 RX ADMIN — CEFEPIME 100 MILLIGRAM(S): 1 INJECTION, POWDER, FOR SOLUTION INTRAMUSCULAR; INTRAVENOUS at 19:12

## 2023-01-01 RX ADMIN — Medication 1 APPLICATION(S): at 06:21

## 2023-01-01 RX ADMIN — HEPARIN SODIUM 5000 UNIT(S): 5000 INJECTION INTRAVENOUS; SUBCUTANEOUS at 05:26

## 2023-01-01 RX ADMIN — GABAPENTIN 100 MILLIGRAM(S): 400 CAPSULE ORAL at 05:52

## 2023-01-01 RX ADMIN — TAMSULOSIN HYDROCHLORIDE 0.4 MILLIGRAM(S): 0.4 CAPSULE ORAL at 21:22

## 2023-01-01 RX ADMIN — Medication 125 MICROGRAM(S): at 05:34

## 2023-01-01 RX ADMIN — Medication 125 MICROGRAM(S): at 05:46

## 2023-01-01 RX ADMIN — DULOXETINE HYDROCHLORIDE 30 MILLIGRAM(S): 30 CAPSULE, DELAYED RELEASE ORAL at 19:50

## 2023-01-01 RX ADMIN — Medication 1 APPLICATION(S): at 05:48

## 2023-01-01 RX ADMIN — DULOXETINE HYDROCHLORIDE 30 MILLIGRAM(S): 30 CAPSULE, DELAYED RELEASE ORAL at 05:51

## 2023-01-01 RX ADMIN — Medication 1 TABLET(S): at 12:03

## 2023-01-01 RX ADMIN — PRASUGREL 10 MILLIGRAM(S): 5 TABLET, FILM COATED ORAL at 13:19

## 2023-01-01 RX ADMIN — SPIRONOLACTONE 25 MILLIGRAM(S): 25 TABLET, FILM COATED ORAL at 05:47

## 2023-01-01 RX ADMIN — Medication 4: at 17:02

## 2023-01-01 RX ADMIN — Medication 400 MILLIGRAM(S): at 14:23

## 2023-01-01 RX ADMIN — DULOXETINE HYDROCHLORIDE 30 MILLIGRAM(S): 30 CAPSULE, DELAYED RELEASE ORAL at 21:58

## 2023-01-01 RX ADMIN — HEPARIN SODIUM 5000 UNIT(S): 5000 INJECTION INTRAVENOUS; SUBCUTANEOUS at 05:18

## 2023-01-01 RX ADMIN — DULOXETINE HYDROCHLORIDE 30 MILLIGRAM(S): 30 CAPSULE, DELAYED RELEASE ORAL at 14:32

## 2023-01-01 RX ADMIN — Medication 325 MILLIGRAM(S): at 19:20

## 2023-01-01 RX ADMIN — PRASUGREL 10 MILLIGRAM(S): 5 TABLET, FILM COATED ORAL at 11:52

## 2023-01-01 RX ADMIN — Medication 1 APPLICATION(S): at 17:14

## 2023-01-01 RX ADMIN — PANTOPRAZOLE SODIUM 40 MILLIGRAM(S): 20 TABLET, DELAYED RELEASE ORAL at 05:28

## 2023-01-01 RX ADMIN — MIDODRINE HYDROCHLORIDE 10 MILLIGRAM(S): 2.5 TABLET ORAL at 18:03

## 2023-01-01 RX ADMIN — Medication 1 APPLICATION(S): at 17:10

## 2023-01-01 RX ADMIN — Medication 60 MILLIEQUIVALENT(S): at 23:55

## 2023-01-01 RX ADMIN — Medication 1 APPLICATION(S): at 18:20

## 2023-01-01 RX ADMIN — Medication 1 TABLET(S): at 11:52

## 2023-01-01 RX ADMIN — DULOXETINE HYDROCHLORIDE 30 MILLIGRAM(S): 30 CAPSULE, DELAYED RELEASE ORAL at 05:28

## 2023-01-01 RX ADMIN — Medication 1 APPLICATION(S): at 05:45

## 2023-01-01 RX ADMIN — ENOXAPARIN SODIUM 30 MILLIGRAM(S): 100 INJECTION SUBCUTANEOUS at 05:45

## 2023-01-01 RX ADMIN — Medication 1 APPLICATION(S): at 17:25

## 2023-01-01 RX ADMIN — Medication 25 MILLIGRAM(S): at 06:12

## 2023-01-01 RX ADMIN — Medication 20 MILLIGRAM(S): at 12:23

## 2023-01-01 RX ADMIN — INSULIN GLARGINE 5 UNIT(S): 100 INJECTION, SOLUTION SUBCUTANEOUS at 21:11

## 2023-01-01 RX ADMIN — Medication 25 GRAM(S): at 09:32

## 2023-01-01 RX ADMIN — HEPARIN SODIUM 5000 UNIT(S): 5000 INJECTION INTRAVENOUS; SUBCUTANEOUS at 05:20

## 2023-01-01 RX ADMIN — CHLORHEXIDINE GLUCONATE 1 APPLICATION(S): 213 SOLUTION TOPICAL at 06:12

## 2023-01-01 RX ADMIN — DULOXETINE HYDROCHLORIDE 30 MILLIGRAM(S): 30 CAPSULE, DELAYED RELEASE ORAL at 12:27

## 2023-01-01 RX ADMIN — Medication 10 MILLIGRAM(S): at 05:27

## 2023-01-01 RX ADMIN — Medication 1 APPLICATION(S): at 07:22

## 2023-01-01 RX ADMIN — PANTOPRAZOLE SODIUM 40 MILLIGRAM(S): 20 TABLET, DELAYED RELEASE ORAL at 06:26

## 2023-01-01 RX ADMIN — MIDODRINE HYDROCHLORIDE 10 MILLIGRAM(S): 2.5 TABLET ORAL at 18:10

## 2023-01-01 RX ADMIN — Medication 2: at 16:57

## 2023-01-01 RX ADMIN — MUPIROCIN 1 APPLICATION(S): 20 OINTMENT TOPICAL at 05:19

## 2023-01-01 RX ADMIN — Medication 81 MILLIGRAM(S): at 11:21

## 2023-01-01 RX ADMIN — DULOXETINE HYDROCHLORIDE 30 MILLIGRAM(S): 30 CAPSULE, DELAYED RELEASE ORAL at 13:23

## 2023-01-01 RX ADMIN — Medication 40 MILLIGRAM(S): at 04:42

## 2023-01-01 RX ADMIN — Medication 125 MICROGRAM(S): at 05:02

## 2023-01-01 RX ADMIN — SODIUM CHLORIDE 1000 MILLILITER(S): 9 INJECTION, SOLUTION INTRAVENOUS at 12:47

## 2023-01-01 RX ADMIN — HEPARIN SODIUM 5000 UNIT(S): 5000 INJECTION INTRAVENOUS; SUBCUTANEOUS at 06:13

## 2023-01-01 RX ADMIN — Medication 1 TABLET(S): at 12:05

## 2023-01-01 RX ADMIN — DULOXETINE HYDROCHLORIDE 30 MILLIGRAM(S): 30 CAPSULE, DELAYED RELEASE ORAL at 17:05

## 2023-01-01 RX ADMIN — MIDODRINE HYDROCHLORIDE 5 MILLIGRAM(S): 2.5 TABLET ORAL at 05:53

## 2023-01-01 RX ADMIN — DULOXETINE HYDROCHLORIDE 30 MILLIGRAM(S): 30 CAPSULE, DELAYED RELEASE ORAL at 06:00

## 2023-01-01 RX ADMIN — Medication 6 UNIT(S): at 11:57

## 2023-01-01 RX ADMIN — GABAPENTIN 100 MILLIGRAM(S): 400 CAPSULE ORAL at 17:17

## 2023-01-01 RX ADMIN — Medication 10 MILLIGRAM(S): at 06:02

## 2023-01-01 RX ADMIN — Medication 2: at 17:58

## 2023-01-01 RX ADMIN — Medication 250 MICROGRAM(S): at 05:44

## 2023-01-01 RX ADMIN — SODIUM CHLORIDE 50 MILLILITER(S): 5 INJECTION, SOLUTION INTRAVENOUS at 08:50

## 2023-01-01 RX ADMIN — Medication 20 MILLIGRAM(S): at 06:24

## 2023-01-01 RX ADMIN — SENNA PLUS 2 TABLET(S): 8.6 TABLET ORAL at 21:18

## 2023-01-01 RX ADMIN — Medication 1 APPLICATION(S): at 05:02

## 2023-01-01 RX ADMIN — PRASUGREL 10 MILLIGRAM(S): 5 TABLET, FILM COATED ORAL at 11:08

## 2023-01-01 RX ADMIN — QUETIAPINE FUMARATE 25 MILLIGRAM(S): 200 TABLET, FILM COATED ORAL at 13:30

## 2023-01-01 RX ADMIN — POLYETHYLENE GLYCOL 3350 17 GRAM(S): 17 POWDER, FOR SOLUTION ORAL at 17:02

## 2023-01-01 RX ADMIN — Medication 81 MILLIGRAM(S): at 11:08

## 2023-01-01 RX ADMIN — SPIRONOLACTONE 25 MILLIGRAM(S): 25 TABLET, FILM COATED ORAL at 05:08

## 2023-01-01 RX ADMIN — BUMETANIDE 2 MILLIGRAM(S): 0.25 INJECTION INTRAMUSCULAR; INTRAVENOUS at 18:59

## 2023-01-01 RX ADMIN — PANTOPRAZOLE SODIUM 40 MILLIGRAM(S): 20 TABLET, DELAYED RELEASE ORAL at 05:30

## 2023-01-01 RX ADMIN — HEPARIN SODIUM 5000 UNIT(S): 5000 INJECTION INTRAVENOUS; SUBCUTANEOUS at 17:51

## 2023-01-01 RX ADMIN — HEPARIN SODIUM 5000 UNIT(S): 5000 INJECTION INTRAVENOUS; SUBCUTANEOUS at 06:03

## 2023-01-01 RX ADMIN — Medication 25 MILLIGRAM(S): at 05:09

## 2023-01-01 RX ADMIN — Medication 325 MILLIGRAM(S): at 11:57

## 2023-01-01 RX ADMIN — DULOXETINE HYDROCHLORIDE 30 MILLIGRAM(S): 30 CAPSULE, DELAYED RELEASE ORAL at 05:37

## 2023-01-01 RX ADMIN — Medication 6.09 MICROGRAM(S)/KG/MIN: at 10:30

## 2023-01-01 RX ADMIN — SODIUM CHLORIDE 100 MILLILITER(S): 9 INJECTION, SOLUTION INTRAVENOUS at 10:36

## 2023-01-01 RX ADMIN — MEROPENEM 100 MILLIGRAM(S): 1 INJECTION INTRAVENOUS at 05:53

## 2023-01-01 RX ADMIN — Medication 20 UNIT(S): at 08:36

## 2023-01-01 RX ADMIN — PRASUGREL 10 MILLIGRAM(S): 5 TABLET, FILM COATED ORAL at 16:38

## 2023-01-01 RX ADMIN — Medication 1 MILLIGRAM(S): at 09:33

## 2023-01-01 RX ADMIN — POLYETHYLENE GLYCOL 3350 17 GRAM(S): 17 POWDER, FOR SOLUTION ORAL at 17:54

## 2023-01-01 RX ADMIN — HEPARIN SODIUM 5000 UNIT(S): 5000 INJECTION INTRAVENOUS; SUBCUTANEOUS at 05:33

## 2023-01-01 RX ADMIN — Medication 25 MILLIGRAM(S): at 06:08

## 2023-01-01 RX ADMIN — DULOXETINE HYDROCHLORIDE 30 MILLIGRAM(S): 30 CAPSULE, DELAYED RELEASE ORAL at 22:04

## 2023-01-01 RX ADMIN — Medication 2: at 17:34

## 2023-01-01 RX ADMIN — HEPARIN SODIUM 5000 UNIT(S): 5000 INJECTION INTRAVENOUS; SUBCUTANEOUS at 16:38

## 2023-01-01 RX ADMIN — QUETIAPINE FUMARATE 50 MILLIGRAM(S): 200 TABLET, FILM COATED ORAL at 22:55

## 2023-01-01 RX ADMIN — Medication 6 UNIT(S): at 11:23

## 2023-01-01 RX ADMIN — HEPARIN SODIUM 5000 UNIT(S): 5000 INJECTION INTRAVENOUS; SUBCUTANEOUS at 06:00

## 2023-01-01 RX ADMIN — CEFEPIME 100 MILLIGRAM(S): 1 INJECTION, POWDER, FOR SOLUTION INTRAMUSCULAR; INTRAVENOUS at 13:24

## 2023-01-01 RX ADMIN — OXYCODONE HYDROCHLORIDE 10 MILLIGRAM(S): 5 TABLET ORAL at 12:20

## 2023-01-01 RX ADMIN — Medication 325 MILLIGRAM(S): at 12:08

## 2023-01-01 RX ADMIN — Medication 1: at 17:02

## 2023-01-01 RX ADMIN — Medication 1 MILLIGRAM(S): at 23:04

## 2023-01-01 RX ADMIN — OXYCODONE HYDROCHLORIDE 5 MILLIGRAM(S): 5 TABLET ORAL at 13:47

## 2023-01-01 RX ADMIN — Medication 25 MILLIGRAM(S): at 06:16

## 2023-01-01 RX ADMIN — Medication 1 APPLICATION(S): at 05:54

## 2023-01-01 RX ADMIN — Medication 1 APPLICATION(S): at 18:06

## 2023-01-01 RX ADMIN — DULOXETINE HYDROCHLORIDE 30 MILLIGRAM(S): 30 CAPSULE, DELAYED RELEASE ORAL at 05:33

## 2023-01-01 RX ADMIN — Medication 40 MILLIGRAM(S): at 17:45

## 2023-01-01 RX ADMIN — Medication 1 APPLICATION(S): at 05:21

## 2023-01-01 RX ADMIN — OXYCODONE HYDROCHLORIDE 10 MILLIGRAM(S): 5 TABLET ORAL at 10:28

## 2023-01-01 RX ADMIN — CEFEPIME 100 MILLIGRAM(S): 1 INJECTION, POWDER, FOR SOLUTION INTRAMUSCULAR; INTRAVENOUS at 13:02

## 2023-01-01 RX ADMIN — DULOXETINE HYDROCHLORIDE 30 MILLIGRAM(S): 30 CAPSULE, DELAYED RELEASE ORAL at 11:10

## 2023-01-01 RX ADMIN — ENOXAPARIN SODIUM 40 MILLIGRAM(S): 100 INJECTION SUBCUTANEOUS at 23:13

## 2023-01-01 RX ADMIN — LACTULOSE 10 GRAM(S): 10 SOLUTION ORAL at 22:53

## 2023-01-01 RX ADMIN — MEROPENEM 100 MILLIGRAM(S): 1 INJECTION INTRAVENOUS at 06:53

## 2023-01-01 RX ADMIN — Medication 1 MILLIGRAM(S): at 21:44

## 2023-01-01 RX ADMIN — SENNA PLUS 2 TABLET(S): 8.6 TABLET ORAL at 21:32

## 2023-01-01 RX ADMIN — DULOXETINE HYDROCHLORIDE 30 MILLIGRAM(S): 30 CAPSULE, DELAYED RELEASE ORAL at 05:02

## 2023-01-01 RX ADMIN — ENOXAPARIN SODIUM 40 MILLIGRAM(S): 100 INJECTION SUBCUTANEOUS at 22:44

## 2023-01-01 RX ADMIN — Medication 25 GRAM(S): at 12:24

## 2023-01-01 RX ADMIN — DULOXETINE HYDROCHLORIDE 30 MILLIGRAM(S): 30 CAPSULE, DELAYED RELEASE ORAL at 11:07

## 2023-01-01 RX ADMIN — Medication 1 MILLIGRAM(S): at 21:26

## 2023-01-01 RX ADMIN — Medication 20 MILLIGRAM(S): at 17:11

## 2023-01-01 RX ADMIN — Medication 1 TABLET(S): at 12:36

## 2023-01-01 RX ADMIN — Medication 81 MILLIGRAM(S): at 11:28

## 2023-01-01 RX ADMIN — DULOXETINE HYDROCHLORIDE 30 MILLIGRAM(S): 30 CAPSULE, DELAYED RELEASE ORAL at 12:20

## 2023-01-01 RX ADMIN — Medication 20 MILLIEQUIVALENT(S): at 12:36

## 2023-01-01 RX ADMIN — Medication 50 MILLIEQUIVALENT(S): at 19:12

## 2023-01-01 RX ADMIN — GABAPENTIN 100 MILLIGRAM(S): 400 CAPSULE ORAL at 16:39

## 2023-01-01 RX ADMIN — CEFTRIAXONE 100 MILLIGRAM(S): 500 INJECTION, POWDER, FOR SOLUTION INTRAMUSCULAR; INTRAVENOUS at 06:16

## 2023-01-01 RX ADMIN — DULOXETINE HYDROCHLORIDE 30 MILLIGRAM(S): 30 CAPSULE, DELAYED RELEASE ORAL at 05:44

## 2023-01-01 RX ADMIN — TAMSULOSIN HYDROCHLORIDE 0.4 MILLIGRAM(S): 0.4 CAPSULE ORAL at 22:01

## 2023-01-01 RX ADMIN — Medication 1 APPLICATION(S): at 17:34

## 2023-01-01 RX ADMIN — Medication 2 MILLIGRAM(S): at 11:55

## 2023-01-01 RX ADMIN — Medication 81 MILLIGRAM(S): at 11:17

## 2023-01-01 RX ADMIN — PANTOPRAZOLE SODIUM 40 MILLIGRAM(S): 20 TABLET, DELAYED RELEASE ORAL at 06:01

## 2023-01-01 RX ADMIN — Medication 25 MILLIGRAM(S): at 05:57

## 2023-01-01 RX ADMIN — SODIUM CHLORIDE 50 MILLILITER(S): 5 INJECTION, SOLUTION INTRAVENOUS at 06:11

## 2023-01-01 RX ADMIN — DULOXETINE HYDROCHLORIDE 30 MILLIGRAM(S): 30 CAPSULE, DELAYED RELEASE ORAL at 22:56

## 2023-01-01 RX ADMIN — Medication 2: at 13:15

## 2023-01-01 RX ADMIN — ATORVASTATIN CALCIUM 80 MILLIGRAM(S): 80 TABLET, FILM COATED ORAL at 21:43

## 2023-01-01 RX ADMIN — Medication 325 MILLIGRAM(S): at 14:43

## 2023-01-01 RX ADMIN — Medication 1 APPLICATION(S): at 05:26

## 2023-01-01 RX ADMIN — ENOXAPARIN SODIUM 30 MILLIGRAM(S): 100 INJECTION SUBCUTANEOUS at 05:35

## 2023-01-01 RX ADMIN — CEFEPIME 100 MILLIGRAM(S): 1 INJECTION, POWDER, FOR SOLUTION INTRAMUSCULAR; INTRAVENOUS at 17:50

## 2023-01-01 RX ADMIN — SODIUM ZIRCONIUM CYCLOSILICATE 10 GRAM(S): 10 POWDER, FOR SUSPENSION ORAL at 02:06

## 2023-01-01 RX ADMIN — Medication 4: at 17:13

## 2023-01-01 RX ADMIN — POLYETHYLENE GLYCOL 3350 17 GRAM(S): 17 POWDER, FOR SOLUTION ORAL at 17:27

## 2023-01-01 RX ADMIN — SODIUM CHLORIDE 100 MILLILITER(S): 9 INJECTION, SOLUTION INTRAVENOUS at 14:08

## 2023-01-01 RX ADMIN — Medication 25 MICROGRAM(S): at 05:01

## 2023-01-01 RX ADMIN — DULOXETINE HYDROCHLORIDE 30 MILLIGRAM(S): 30 CAPSULE, DELAYED RELEASE ORAL at 06:18

## 2023-01-01 RX ADMIN — PANTOPRAZOLE SODIUM 40 MILLIGRAM(S): 20 TABLET, DELAYED RELEASE ORAL at 06:46

## 2023-01-01 RX ADMIN — DULOXETINE HYDROCHLORIDE 30 MILLIGRAM(S): 30 CAPSULE, DELAYED RELEASE ORAL at 20:35

## 2023-01-01 RX ADMIN — Medication 1 APPLICATION(S): at 21:58

## 2023-01-01 RX ADMIN — Medication 1 APPLICATION(S): at 16:57

## 2023-01-01 RX ADMIN — Medication 25 GRAM(S): at 18:35

## 2023-01-01 RX ADMIN — LACTULOSE 10 GRAM(S): 10 SOLUTION ORAL at 21:43

## 2023-01-01 RX ADMIN — PANTOPRAZOLE SODIUM 40 MILLIGRAM(S): 20 TABLET, DELAYED RELEASE ORAL at 06:00

## 2023-01-01 RX ADMIN — Medication 1 APPLICATION(S): at 05:53

## 2023-01-01 RX ADMIN — Medication 25 MICROGRAM(S): at 06:43

## 2023-01-01 RX ADMIN — DULOXETINE HYDROCHLORIDE 30 MILLIGRAM(S): 30 CAPSULE, DELAYED RELEASE ORAL at 05:46

## 2023-01-01 RX ADMIN — TAMSULOSIN HYDROCHLORIDE 0.4 MILLIGRAM(S): 0.4 CAPSULE ORAL at 21:28

## 2023-01-01 RX ADMIN — Medication 25 GRAM(S): at 15:56

## 2023-01-01 RX ADMIN — Medication 2: at 11:50

## 2023-01-01 RX ADMIN — Medication 40 MILLIGRAM(S): at 05:17

## 2023-01-01 RX ADMIN — POLYETHYLENE GLYCOL 3350 17 GRAM(S): 17 POWDER, FOR SOLUTION ORAL at 06:12

## 2023-01-01 RX ADMIN — Medication 1: at 05:29

## 2023-01-01 RX ADMIN — HEPARIN SODIUM 5000 UNIT(S): 5000 INJECTION INTRAVENOUS; SUBCUTANEOUS at 18:00

## 2023-01-01 RX ADMIN — Medication 1 APPLICATION(S): at 05:29

## 2023-01-01 RX ADMIN — Medication 250 MICROGRAM(S): at 06:19

## 2023-01-01 RX ADMIN — BUMETANIDE 2 MILLIGRAM(S): 0.25 INJECTION INTRAMUSCULAR; INTRAVENOUS at 02:03

## 2023-01-01 RX ADMIN — CEFEPIME 2000 MILLIGRAM(S): 1 INJECTION, POWDER, FOR SOLUTION INTRAMUSCULAR; INTRAVENOUS at 16:27

## 2023-01-01 RX ADMIN — Medication 40 MILLIGRAM(S): at 15:22

## 2023-01-01 RX ADMIN — POLYETHYLENE GLYCOL 3350 17 GRAM(S): 17 POWDER, FOR SOLUTION ORAL at 17:22

## 2023-01-01 RX ADMIN — PANTOPRAZOLE SODIUM 40 MILLIGRAM(S): 20 TABLET, DELAYED RELEASE ORAL at 05:13

## 2023-01-01 RX ADMIN — PRASUGREL 10 MILLIGRAM(S): 5 TABLET, FILM COATED ORAL at 11:53

## 2023-01-01 RX ADMIN — Medication 81 MILLIGRAM(S): at 11:38

## 2023-01-01 RX ADMIN — Medication 1 MILLIGRAM(S): at 07:24

## 2023-01-01 RX ADMIN — PANTOPRAZOLE SODIUM 40 MILLIGRAM(S): 20 TABLET, DELAYED RELEASE ORAL at 05:37

## 2023-01-01 RX ADMIN — SPIRONOLACTONE 12.5 MILLIGRAM(S): 25 TABLET, FILM COATED ORAL at 06:24

## 2023-01-01 RX ADMIN — DULOXETINE HYDROCHLORIDE 30 MILLIGRAM(S): 30 CAPSULE, DELAYED RELEASE ORAL at 22:35

## 2023-01-01 RX ADMIN — HEPARIN SODIUM 5000 UNIT(S): 5000 INJECTION INTRAVENOUS; SUBCUTANEOUS at 06:26

## 2023-01-01 RX ADMIN — ENOXAPARIN SODIUM 40 MILLIGRAM(S): 100 INJECTION SUBCUTANEOUS at 23:40

## 2023-01-01 RX ADMIN — Medication 1 APPLICATION(S): at 18:04

## 2023-01-01 RX ADMIN — Medication 25 MICROGRAM(S): at 06:28

## 2023-01-01 RX ADMIN — Medication 1 APPLICATION(S): at 05:14

## 2023-01-01 RX ADMIN — Medication 1 MILLIGRAM(S): at 11:53

## 2023-01-01 RX ADMIN — Medication 1 MILLIGRAM(S): at 09:28

## 2023-01-01 RX ADMIN — DULOXETINE HYDROCHLORIDE 30 MILLIGRAM(S): 30 CAPSULE, DELAYED RELEASE ORAL at 13:13

## 2023-01-01 RX ADMIN — POLYETHYLENE GLYCOL 3350 17 GRAM(S): 17 POWDER, FOR SOLUTION ORAL at 17:43

## 2023-01-01 RX ADMIN — CEFEPIME 100 MILLIGRAM(S): 1 INJECTION, POWDER, FOR SOLUTION INTRAMUSCULAR; INTRAVENOUS at 05:31

## 2023-01-01 RX ADMIN — ENOXAPARIN SODIUM 30 MILLIGRAM(S): 100 INJECTION SUBCUTANEOUS at 05:02

## 2023-01-01 RX ADMIN — Medication 81 MILLIGRAM(S): at 12:45

## 2023-01-01 RX ADMIN — INSULIN GLARGINE 10 UNIT(S): 100 INJECTION, SOLUTION SUBCUTANEOUS at 22:39

## 2023-01-01 RX ADMIN — Medication 250 MICROGRAM(S): at 06:05

## 2023-01-01 RX ADMIN — PRASUGREL 10 MILLIGRAM(S): 5 TABLET, FILM COATED ORAL at 11:57

## 2023-01-01 RX ADMIN — GABAPENTIN 100 MILLIGRAM(S): 400 CAPSULE ORAL at 17:02

## 2023-01-01 RX ADMIN — Medication 10 MILLIGRAM(S): at 06:24

## 2023-01-01 RX ADMIN — Medication 40 MILLIGRAM(S): at 05:35

## 2023-01-01 RX ADMIN — Medication 25 MILLIGRAM(S): at 05:45

## 2023-01-01 RX ADMIN — CHLORHEXIDINE GLUCONATE 1 APPLICATION(S): 213 SOLUTION TOPICAL at 06:25

## 2023-01-01 RX ADMIN — Medication 25 MICROGRAM(S): at 06:54

## 2023-01-01 RX ADMIN — Medication 2: at 11:57

## 2023-01-01 RX ADMIN — Medication 40 MILLIGRAM(S): at 15:21

## 2023-01-01 RX ADMIN — Medication 25 MICROGRAM(S): at 05:52

## 2023-01-01 RX ADMIN — ENOXAPARIN SODIUM 30 MILLIGRAM(S): 100 INJECTION SUBCUTANEOUS at 05:08

## 2023-01-01 RX ADMIN — Medication 3: at 06:42

## 2023-01-01 RX ADMIN — Medication 1 APPLICATION(S): at 18:05

## 2023-01-01 RX ADMIN — Medication 1 APPLICATION(S): at 17:16

## 2023-01-01 RX ADMIN — LATANOPROST 1 DROP(S): 0.05 SOLUTION/ DROPS OPHTHALMIC; TOPICAL at 22:42

## 2023-01-01 RX ADMIN — Medication 250 MILLIGRAM(S): at 18:26

## 2023-01-01 RX ADMIN — PRASUGREL 10 MILLIGRAM(S): 5 TABLET, FILM COATED ORAL at 11:43

## 2023-01-01 RX ADMIN — Medication 25 MICROGRAM(S): at 05:37

## 2023-01-01 RX ADMIN — Medication 25 MILLIGRAM(S): at 06:25

## 2023-01-01 RX ADMIN — DULOXETINE HYDROCHLORIDE 30 MILLIGRAM(S): 30 CAPSULE, DELAYED RELEASE ORAL at 14:50

## 2023-01-01 RX ADMIN — MUPIROCIN 1 APPLICATION(S): 20 OINTMENT TOPICAL at 06:05

## 2023-01-01 RX ADMIN — PRASUGREL 10 MILLIGRAM(S): 5 TABLET, FILM COATED ORAL at 15:32

## 2023-01-01 RX ADMIN — PANTOPRAZOLE SODIUM 40 MILLIGRAM(S): 20 TABLET, DELAYED RELEASE ORAL at 05:21

## 2023-01-01 RX ADMIN — ATORVASTATIN CALCIUM 80 MILLIGRAM(S): 80 TABLET, FILM COATED ORAL at 23:13

## 2023-01-01 RX ADMIN — Medication 6: at 11:45

## 2023-01-01 RX ADMIN — Medication 1: at 17:19

## 2023-01-01 RX ADMIN — METHOTREXATE 10 MILLIGRAM(S): 2.5 TABLET ORAL at 12:32

## 2023-01-01 RX ADMIN — Medication 1 TABLET(S): at 12:41

## 2023-01-01 RX ADMIN — Medication 1 APPLICATION(S): at 06:25

## 2023-01-01 RX ADMIN — MIDODRINE HYDROCHLORIDE 10 MILLIGRAM(S): 2.5 TABLET ORAL at 17:44

## 2023-01-01 RX ADMIN — Medication 40 MILLIGRAM(S): at 13:14

## 2023-01-01 RX ADMIN — Medication 1 TABLET(S): at 11:43

## 2023-01-01 RX ADMIN — MIDODRINE HYDROCHLORIDE 5 MILLIGRAM(S): 2.5 TABLET ORAL at 05:37

## 2023-01-01 RX ADMIN — ATORVASTATIN CALCIUM 80 MILLIGRAM(S): 80 TABLET, FILM COATED ORAL at 21:16

## 2023-01-01 RX ADMIN — Medication 1 TABLET(S): at 11:18

## 2023-01-01 RX ADMIN — IRON SUCROSE 110 MILLIGRAM(S): 20 INJECTION, SOLUTION INTRAVENOUS at 14:07

## 2023-01-01 RX ADMIN — Medication 25 MILLIGRAM(S): at 06:42

## 2023-01-01 RX ADMIN — CHLORHEXIDINE GLUCONATE 1 APPLICATION(S): 213 SOLUTION TOPICAL at 06:21

## 2023-01-01 RX ADMIN — Medication 81 MILLIGRAM(S): at 13:19

## 2023-01-01 RX ADMIN — DULOXETINE HYDROCHLORIDE 30 MILLIGRAM(S): 30 CAPSULE, DELAYED RELEASE ORAL at 14:33

## 2023-01-01 RX ADMIN — OXYCODONE HYDROCHLORIDE 10 MILLIGRAM(S): 5 TABLET ORAL at 20:35

## 2023-01-01 RX ADMIN — Medication 20 MILLIGRAM(S): at 05:53

## 2023-01-01 RX ADMIN — PANTOPRAZOLE SODIUM 40 MILLIGRAM(S): 20 TABLET, DELAYED RELEASE ORAL at 06:41

## 2023-01-01 RX ADMIN — Medication 1: at 08:30

## 2023-01-01 RX ADMIN — SENNA PLUS 2 TABLET(S): 8.6 TABLET ORAL at 22:26

## 2023-01-01 RX ADMIN — DULOXETINE HYDROCHLORIDE 30 MILLIGRAM(S): 30 CAPSULE, DELAYED RELEASE ORAL at 06:01

## 2023-01-01 RX ADMIN — Medication 25 GRAM(S): at 18:04

## 2023-01-01 RX ADMIN — Medication 2: at 17:06

## 2023-01-01 RX ADMIN — Medication 325 MILLIGRAM(S): at 13:14

## 2023-01-01 RX ADMIN — SODIUM CHLORIDE 50 MILLILITER(S): 5 INJECTION, SOLUTION INTRAVENOUS at 18:01

## 2023-01-01 RX ADMIN — Medication 40 MILLIGRAM(S): at 13:08

## 2023-01-01 RX ADMIN — Medication 1 MILLIGRAM(S): at 21:12

## 2023-01-01 RX ADMIN — Medication 81 MILLIGRAM(S): at 11:55

## 2023-01-01 RX ADMIN — HEPARIN SODIUM 5000 UNIT(S): 5000 INJECTION INTRAVENOUS; SUBCUTANEOUS at 18:11

## 2023-01-01 RX ADMIN — Medication 1 APPLICATION(S): at 18:01

## 2023-01-01 RX ADMIN — DULOXETINE HYDROCHLORIDE 30 MILLIGRAM(S): 30 CAPSULE, DELAYED RELEASE ORAL at 06:33

## 2023-01-01 RX ADMIN — Medication 25 MILLIGRAM(S): at 06:20

## 2023-01-01 RX ADMIN — DULOXETINE HYDROCHLORIDE 30 MILLIGRAM(S): 30 CAPSULE, DELAYED RELEASE ORAL at 17:02

## 2023-01-01 RX ADMIN — BUMETANIDE 2 MILLIGRAM(S): 0.25 INJECTION INTRAMUSCULAR; INTRAVENOUS at 17:39

## 2023-01-01 RX ADMIN — DULOXETINE HYDROCHLORIDE 30 MILLIGRAM(S): 30 CAPSULE, DELAYED RELEASE ORAL at 05:26

## 2023-01-01 RX ADMIN — DULOXETINE HYDROCHLORIDE 30 MILLIGRAM(S): 30 CAPSULE, DELAYED RELEASE ORAL at 19:38

## 2023-01-01 RX ADMIN — SODIUM CHLORIDE 500 MILLILITER(S): 9 INJECTION INTRAMUSCULAR; INTRAVENOUS; SUBCUTANEOUS at 09:03

## 2023-01-01 RX ADMIN — DULOXETINE HYDROCHLORIDE 30 MILLIGRAM(S): 30 CAPSULE, DELAYED RELEASE ORAL at 06:41

## 2023-01-01 RX ADMIN — Medication 25 MICROGRAM(S): at 05:02

## 2023-01-01 RX ADMIN — CHLORHEXIDINE GLUCONATE 1 APPLICATION(S): 213 SOLUTION TOPICAL at 05:53

## 2023-01-01 RX ADMIN — HEPARIN SODIUM 5000 UNIT(S): 5000 INJECTION INTRAVENOUS; SUBCUTANEOUS at 17:27

## 2023-01-01 RX ADMIN — GLUCAGON INJECTION, SOLUTION 1 MILLIGRAM(S): 0.5 INJECTION, SOLUTION SUBCUTANEOUS at 17:24

## 2023-01-01 RX ADMIN — INSULIN GLARGINE 18 UNIT(S): 100 INJECTION, SOLUTION SUBCUTANEOUS at 02:01

## 2023-01-01 RX ADMIN — Medication 1 MILLIGRAM(S): at 22:21

## 2023-01-01 RX ADMIN — PROPOFOL 8.05 MICROGRAM(S)/KG/MIN: 10 INJECTION, EMULSION INTRAVENOUS at 23:55

## 2023-01-01 RX ADMIN — HEPARIN SODIUM 5000 UNIT(S): 5000 INJECTION INTRAVENOUS; SUBCUTANEOUS at 17:40

## 2023-01-01 RX ADMIN — Medication 6.38 MICROGRAM(S)/KG/MIN: at 07:00

## 2023-01-01 RX ADMIN — Medication 166.67 MILLIGRAM(S): at 15:30

## 2023-01-01 RX ADMIN — Medication 25 MICROGRAM(S): at 05:29

## 2023-01-01 RX ADMIN — Medication 1 TABLET(S): at 12:49

## 2023-01-01 RX ADMIN — INSULIN GLARGINE 25 UNIT(S): 100 INJECTION, SOLUTION SUBCUTANEOUS at 22:36

## 2023-01-01 RX ADMIN — Medication 1 TABLET(S): at 12:24

## 2023-01-01 RX ADMIN — Medication 10 MILLIGRAM(S): at 13:28

## 2023-01-01 RX ADMIN — Medication 1 APPLICATION(S): at 17:19

## 2023-01-01 RX ADMIN — DULOXETINE HYDROCHLORIDE 30 MILLIGRAM(S): 30 CAPSULE, DELAYED RELEASE ORAL at 22:30

## 2023-01-01 RX ADMIN — OXYCODONE HYDROCHLORIDE 10 MILLIGRAM(S): 5 TABLET ORAL at 14:50

## 2023-01-01 RX ADMIN — PRASUGREL 10 MILLIGRAM(S): 5 TABLET, FILM COATED ORAL at 16:45

## 2023-01-01 RX ADMIN — LACTULOSE 10 GRAM(S): 10 SOLUTION ORAL at 22:26

## 2023-01-01 RX ADMIN — PRASUGREL 10 MILLIGRAM(S): 5 TABLET, FILM COATED ORAL at 12:24

## 2023-01-01 RX ADMIN — Medication 25 MICROGRAM(S): at 06:17

## 2023-01-01 RX ADMIN — OXYCODONE HYDROCHLORIDE 10 MILLIGRAM(S): 5 TABLET ORAL at 11:51

## 2023-01-01 RX ADMIN — CEFEPIME 100 MILLIGRAM(S): 1 INJECTION, POWDER, FOR SOLUTION INTRAMUSCULAR; INTRAVENOUS at 14:53

## 2023-01-01 RX ADMIN — Medication 250 MICROGRAM(S): at 06:14

## 2023-01-01 RX ADMIN — GABAPENTIN 100 MILLIGRAM(S): 400 CAPSULE ORAL at 17:28

## 2023-01-01 RX ADMIN — MIDODRINE HYDROCHLORIDE 5 MILLIGRAM(S): 2.5 TABLET ORAL at 17:26

## 2023-01-01 RX ADMIN — MEROPENEM 100 MILLIGRAM(S): 1 INJECTION INTRAVENOUS at 17:01

## 2023-01-01 RX ADMIN — MEROPENEM 100 MILLIGRAM(S): 1 INJECTION INTRAVENOUS at 05:17

## 2023-01-01 RX ADMIN — OXYCODONE HYDROCHLORIDE 10 MILLIGRAM(S): 5 TABLET ORAL at 06:16

## 2023-01-01 RX ADMIN — Medication 1 APPLICATION(S): at 05:59

## 2023-01-01 RX ADMIN — DULOXETINE HYDROCHLORIDE 30 MILLIGRAM(S): 30 CAPSULE, DELAYED RELEASE ORAL at 21:15

## 2023-01-01 RX ADMIN — Medication 166.67 MILLIGRAM(S): at 21:00

## 2023-01-01 RX ADMIN — PRASUGREL 10 MILLIGRAM(S): 5 TABLET, FILM COATED ORAL at 11:28

## 2023-01-01 RX ADMIN — SPIRONOLACTONE 25 MILLIGRAM(S): 25 TABLET, FILM COATED ORAL at 05:46

## 2023-01-01 RX ADMIN — Medication 1: at 17:26

## 2023-01-01 RX ADMIN — POLYETHYLENE GLYCOL 3350 17 GRAM(S): 17 POWDER, FOR SOLUTION ORAL at 05:35

## 2023-01-01 RX ADMIN — Medication 81 MILLIGRAM(S): at 11:56

## 2023-01-01 RX ADMIN — SODIUM CHLORIDE 50 MILLILITER(S): 5 INJECTION, SOLUTION INTRAVENOUS at 20:00

## 2023-01-01 RX ADMIN — Medication 250 MILLIGRAM(S): at 16:56

## 2023-01-01 RX ADMIN — INSULIN GLARGINE 12 UNIT(S): 100 INJECTION, SOLUTION SUBCUTANEOUS at 08:05

## 2023-01-01 RX ADMIN — SODIUM CHLORIDE 50 MILLILITER(S): 9 INJECTION, SOLUTION INTRAVENOUS at 00:30

## 2023-01-01 RX ADMIN — Medication 25 MICROGRAM(S): at 06:42

## 2023-01-01 RX ADMIN — AMIODARONE HYDROCHLORIDE 600 MILLIGRAM(S): 400 TABLET ORAL at 18:35

## 2023-01-01 RX ADMIN — Medication 40 MILLIEQUIVALENT(S): at 13:24

## 2023-01-01 RX ADMIN — Medication 1 TABLET(S): at 11:22

## 2023-01-01 RX ADMIN — MIDODRINE HYDROCHLORIDE 10 MILLIGRAM(S): 2.5 TABLET ORAL at 12:22

## 2023-01-01 RX ADMIN — MIDODRINE HYDROCHLORIDE 10 MILLIGRAM(S): 2.5 TABLET ORAL at 17:40

## 2023-01-01 RX ADMIN — DULOXETINE HYDROCHLORIDE 30 MILLIGRAM(S): 30 CAPSULE, DELAYED RELEASE ORAL at 06:25

## 2023-01-01 RX ADMIN — DULOXETINE HYDROCHLORIDE 30 MILLIGRAM(S): 30 CAPSULE, DELAYED RELEASE ORAL at 21:48

## 2023-01-01 RX ADMIN — Medication 81 MILLIGRAM(S): at 12:23

## 2023-01-01 RX ADMIN — Medication 25 MICROGRAM(S): at 06:29

## 2023-01-01 RX ADMIN — Medication 1 TABLET(S): at 11:26

## 2023-01-01 RX ADMIN — Medication 25 MICROGRAM(S): at 06:25

## 2023-01-01 RX ADMIN — INSULIN GLARGINE 12 UNIT(S): 100 INJECTION, SOLUTION SUBCUTANEOUS at 08:39

## 2023-01-01 RX ADMIN — Medication 50 MILLIEQUIVALENT(S): at 23:11

## 2023-01-01 RX ADMIN — HEPARIN SODIUM 5000 UNIT(S): 5000 INJECTION INTRAVENOUS; SUBCUTANEOUS at 17:15

## 2023-01-01 RX ADMIN — MUPIROCIN 1 APPLICATION(S): 20 OINTMENT TOPICAL at 18:43

## 2023-01-01 RX ADMIN — Medication 2: at 17:49

## 2023-01-01 RX ADMIN — Medication 40 MILLIGRAM(S): at 13:39

## 2023-01-01 RX ADMIN — PRASUGREL 10 MILLIGRAM(S): 5 TABLET, FILM COATED ORAL at 12:31

## 2023-01-01 RX ADMIN — ONDANSETRON 4 MILLIGRAM(S): 8 TABLET, FILM COATED ORAL at 23:05

## 2023-01-01 RX ADMIN — Medication 325 MILLIGRAM(S): at 03:44

## 2023-01-01 RX ADMIN — CHLORHEXIDINE GLUCONATE 1 APPLICATION(S): 213 SOLUTION TOPICAL at 11:36

## 2023-01-01 RX ADMIN — Medication 166.67 MILLIGRAM(S): at 09:23

## 2023-01-01 RX ADMIN — Medication 1 MILLIGRAM(S): at 22:40

## 2023-01-01 RX ADMIN — Medication 325 MILLIGRAM(S): at 11:45

## 2023-01-01 RX ADMIN — Medication 1 APPLICATION(S): at 17:18

## 2023-01-01 RX ADMIN — MIDODRINE HYDROCHLORIDE 5 MILLIGRAM(S): 2.5 TABLET ORAL at 06:16

## 2023-01-01 RX ADMIN — SODIUM ZIRCONIUM CYCLOSILICATE 5 GRAM(S): 10 POWDER, FOR SUSPENSION ORAL at 16:56

## 2023-01-01 RX ADMIN — Medication 4: at 08:37

## 2023-01-01 RX ADMIN — MIDODRINE HYDROCHLORIDE 5 MILLIGRAM(S): 2.5 TABLET ORAL at 17:51

## 2023-01-01 RX ADMIN — Medication 40 MILLIGRAM(S): at 14:12

## 2023-01-01 RX ADMIN — Medication 50 MILLILITER(S): at 14:17

## 2023-01-01 RX ADMIN — DULOXETINE HYDROCHLORIDE 30 MILLIGRAM(S): 30 CAPSULE, DELAYED RELEASE ORAL at 15:22

## 2023-01-01 RX ADMIN — SENNA PLUS 2 TABLET(S): 8.6 TABLET ORAL at 21:22

## 2023-01-01 RX ADMIN — DULOXETINE HYDROCHLORIDE 30 MILLIGRAM(S): 30 CAPSULE, DELAYED RELEASE ORAL at 05:01

## 2023-01-01 RX ADMIN — Medication 1 APPLICATION(S): at 17:12

## 2023-01-01 RX ADMIN — Medication 1: at 12:35

## 2023-01-01 RX ADMIN — MIDODRINE HYDROCHLORIDE 10 MILLIGRAM(S): 2.5 TABLET ORAL at 17:37

## 2023-01-01 RX ADMIN — Medication 25 MICROGRAM(S): at 06:22

## 2023-01-01 RX ADMIN — LACTULOSE 10 GRAM(S): 10 SOLUTION ORAL at 22:11

## 2023-01-01 RX ADMIN — Medication 1 APPLICATION(S): at 07:10

## 2023-01-01 RX ADMIN — Medication 81 MILLIGRAM(S): at 12:35

## 2023-01-01 RX ADMIN — SODIUM CHLORIDE 50 MILLILITER(S): 5 INJECTION, SOLUTION INTRAVENOUS at 06:22

## 2023-01-01 RX ADMIN — Medication 250 MICROGRAM(S): at 06:43

## 2023-01-01 RX ADMIN — PANTOPRAZOLE SODIUM 40 MILLIGRAM(S): 20 TABLET, DELAYED RELEASE ORAL at 06:20

## 2023-01-01 RX ADMIN — Medication 8: at 11:36

## 2023-01-01 RX ADMIN — MUPIROCIN 1 APPLICATION(S): 20 OINTMENT TOPICAL at 17:08

## 2023-01-01 RX ADMIN — LACTULOSE 10 GRAM(S): 10 SOLUTION ORAL at 21:48

## 2023-01-01 RX ADMIN — BUMETANIDE 2 MILLIGRAM(S): 0.25 INJECTION INTRAMUSCULAR; INTRAVENOUS at 18:14

## 2023-01-01 RX ADMIN — CHLORHEXIDINE GLUCONATE 1 APPLICATION(S): 213 SOLUTION TOPICAL at 13:19

## 2023-01-01 RX ADMIN — DEXMEDETOMIDINE HYDROCHLORIDE IN 0.9% SODIUM CHLORIDE 3.25 MICROGRAM(S)/KG/HR: 4 INJECTION INTRAVENOUS at 03:47

## 2023-01-01 RX ADMIN — Medication 2: at 12:07

## 2023-01-01 RX ADMIN — OXYCODONE HYDROCHLORIDE 10 MILLIGRAM(S): 5 TABLET ORAL at 06:03

## 2023-01-01 RX ADMIN — PRASUGREL 10 MILLIGRAM(S): 5 TABLET, FILM COATED ORAL at 11:56

## 2023-01-01 RX ADMIN — Medication 1 APPLICATION(S): at 17:52

## 2023-01-01 RX ADMIN — PRASUGREL 10 MILLIGRAM(S): 5 TABLET, FILM COATED ORAL at 15:37

## 2023-01-01 RX ADMIN — Medication 25 MILLIGRAM(S): at 06:04

## 2023-01-01 RX ADMIN — CEFTRIAXONE 100 MILLIGRAM(S): 500 INJECTION, POWDER, FOR SOLUTION INTRAMUSCULAR; INTRAVENOUS at 17:07

## 2023-01-01 RX ADMIN — Medication 1 MILLIGRAM(S): at 08:05

## 2023-01-01 RX ADMIN — DULOXETINE HYDROCHLORIDE 30 MILLIGRAM(S): 30 CAPSULE, DELAYED RELEASE ORAL at 05:34

## 2023-01-01 RX ADMIN — Medication 40 MILLIGRAM(S): at 14:51

## 2023-01-01 RX ADMIN — QUETIAPINE FUMARATE 50 MILLIGRAM(S): 200 TABLET, FILM COATED ORAL at 21:41

## 2023-01-01 RX ADMIN — Medication 100 MILLIGRAM(S): at 19:13

## 2023-01-01 RX ADMIN — DULOXETINE HYDROCHLORIDE 30 MILLIGRAM(S): 30 CAPSULE, DELAYED RELEASE ORAL at 15:21

## 2023-01-01 RX ADMIN — INSULIN GLARGINE 5 UNIT(S): 100 INJECTION, SOLUTION SUBCUTANEOUS at 23:01

## 2023-01-01 RX ADMIN — Medication 2: at 12:56

## 2023-01-01 RX ADMIN — Medication 40 MILLIGRAM(S): at 17:58

## 2023-01-01 RX ADMIN — HEPARIN SODIUM 5000 UNIT(S): 5000 INJECTION INTRAVENOUS; SUBCUTANEOUS at 06:02

## 2023-01-01 RX ADMIN — Medication 1 TABLET(S): at 12:25

## 2023-01-01 RX ADMIN — PANTOPRAZOLE SODIUM 40 MILLIGRAM(S): 20 TABLET, DELAYED RELEASE ORAL at 06:43

## 2023-01-01 RX ADMIN — DULOXETINE HYDROCHLORIDE 30 MILLIGRAM(S): 30 CAPSULE, DELAYED RELEASE ORAL at 11:19

## 2023-01-01 RX ADMIN — OXYCODONE HYDROCHLORIDE 10 MILLIGRAM(S): 5 TABLET ORAL at 06:20

## 2023-01-01 RX ADMIN — DULOXETINE HYDROCHLORIDE 30 MILLIGRAM(S): 30 CAPSULE, DELAYED RELEASE ORAL at 11:28

## 2023-01-01 RX ADMIN — MUPIROCIN 1 APPLICATION(S): 20 OINTMENT TOPICAL at 05:24

## 2023-01-01 RX ADMIN — Medication 1 MILLIGRAM(S): at 08:40

## 2023-01-01 RX ADMIN — HEPARIN SODIUM 5000 UNIT(S): 5000 INJECTION INTRAVENOUS; SUBCUTANEOUS at 06:34

## 2023-01-01 RX ADMIN — Medication 25 MICROGRAM(S): at 06:20

## 2023-01-01 RX ADMIN — SENNA PLUS 2 TABLET(S): 8.6 TABLET ORAL at 21:47

## 2023-01-01 RX ADMIN — DULOXETINE HYDROCHLORIDE 30 MILLIGRAM(S): 30 CAPSULE, DELAYED RELEASE ORAL at 18:11

## 2023-01-01 RX ADMIN — QUETIAPINE FUMARATE 25 MILLIGRAM(S): 200 TABLET, FILM COATED ORAL at 12:33

## 2023-01-01 RX ADMIN — PANTOPRAZOLE SODIUM 40 MILLIGRAM(S): 20 TABLET, DELAYED RELEASE ORAL at 08:50

## 2023-01-01 RX ADMIN — Medication 20 MILLIEQUIVALENT(S): at 22:25

## 2023-01-01 RX ADMIN — Medication 1 MILLIGRAM(S): at 22:00

## 2023-01-01 RX ADMIN — DULOXETINE HYDROCHLORIDE 30 MILLIGRAM(S): 30 CAPSULE, DELAYED RELEASE ORAL at 20:55

## 2023-01-01 RX ADMIN — Medication 100 MILLIGRAM(S): at 02:01

## 2023-01-01 RX ADMIN — Medication 10 MILLIGRAM(S): at 17:51

## 2023-01-01 RX ADMIN — Medication 81 MILLIGRAM(S): at 11:03

## 2023-01-01 RX ADMIN — HEPARIN SODIUM 5000 UNIT(S): 5000 INJECTION INTRAVENOUS; SUBCUTANEOUS at 06:17

## 2023-01-01 RX ADMIN — CHLORHEXIDINE GLUCONATE 1 APPLICATION(S): 213 SOLUTION TOPICAL at 11:07

## 2023-01-01 RX ADMIN — PANTOPRAZOLE SODIUM 40 MILLIGRAM(S): 20 TABLET, DELAYED RELEASE ORAL at 06:16

## 2023-01-01 RX ADMIN — Medication 1 APPLICATION(S): at 06:12

## 2023-01-01 RX ADMIN — CHLORHEXIDINE GLUCONATE 1 APPLICATION(S): 213 SOLUTION TOPICAL at 12:19

## 2023-01-01 RX ADMIN — GABAPENTIN 100 MILLIGRAM(S): 400 CAPSULE ORAL at 05:30

## 2023-01-01 RX ADMIN — Medication 20 MILLIGRAM(S): at 12:20

## 2023-01-01 RX ADMIN — DULOXETINE HYDROCHLORIDE 30 MILLIGRAM(S): 30 CAPSULE, DELAYED RELEASE ORAL at 21:56

## 2023-01-01 RX ADMIN — LACTULOSE 10 GRAM(S): 10 SOLUTION ORAL at 21:06

## 2023-01-01 RX ADMIN — Medication 2: at 11:28

## 2023-01-01 RX ADMIN — Medication 81 MILLIGRAM(S): at 11:45

## 2023-01-01 RX ADMIN — Medication 1 TABLET(S): at 12:58

## 2023-01-01 RX ADMIN — OXYCODONE HYDROCHLORIDE 10 MILLIGRAM(S): 5 TABLET ORAL at 12:08

## 2023-01-01 RX ADMIN — Medication 81 MILLIGRAM(S): at 11:52

## 2023-01-01 RX ADMIN — PRASUGREL 10 MILLIGRAM(S): 5 TABLET, FILM COATED ORAL at 13:08

## 2023-01-01 RX ADMIN — Medication 125 MICROGRAM(S): at 05:22

## 2023-01-01 RX ADMIN — Medication 2: at 17:20

## 2023-01-01 RX ADMIN — DULOXETINE HYDROCHLORIDE 30 MILLIGRAM(S): 30 CAPSULE, DELAYED RELEASE ORAL at 23:14

## 2023-01-01 RX ADMIN — PANTOPRAZOLE SODIUM 40 MILLIGRAM(S): 20 TABLET, DELAYED RELEASE ORAL at 05:05

## 2023-01-01 RX ADMIN — Medication 1 APPLICATION(S): at 17:39

## 2023-01-01 RX ADMIN — Medication 2: at 08:47

## 2023-01-01 RX ADMIN — PRASUGREL 10 MILLIGRAM(S): 5 TABLET, FILM COATED ORAL at 12:04

## 2023-01-01 RX ADMIN — Medication 100 MILLIGRAM(S): at 21:58

## 2023-01-01 RX ADMIN — ATORVASTATIN CALCIUM 80 MILLIGRAM(S): 80 TABLET, FILM COATED ORAL at 22:42

## 2023-01-01 RX ADMIN — POLYETHYLENE GLYCOL 3350 17 GRAM(S): 17 POWDER, FOR SOLUTION ORAL at 06:39

## 2023-01-01 RX ADMIN — SENNA PLUS 2 TABLET(S): 8.6 TABLET ORAL at 21:44

## 2023-01-01 RX ADMIN — Medication 20 MILLIGRAM(S): at 06:25

## 2023-01-01 RX ADMIN — INSULIN GLARGINE 12 UNIT(S): 100 INJECTION, SOLUTION SUBCUTANEOUS at 21:46

## 2023-01-01 RX ADMIN — MIDODRINE HYDROCHLORIDE 10 MILLIGRAM(S): 2.5 TABLET ORAL at 22:10

## 2023-01-01 RX ADMIN — Medication 1 APPLICATION(S): at 17:02

## 2023-01-01 RX ADMIN — Medication 1 APPLICATION(S): at 05:52

## 2023-01-01 RX ADMIN — MIDODRINE HYDROCHLORIDE 5 MILLIGRAM(S): 2.5 TABLET ORAL at 17:39

## 2023-01-01 RX ADMIN — DULOXETINE HYDROCHLORIDE 30 MILLIGRAM(S): 30 CAPSULE, DELAYED RELEASE ORAL at 11:38

## 2023-01-01 RX ADMIN — PANTOPRAZOLE SODIUM 40 MILLIGRAM(S): 20 TABLET, DELAYED RELEASE ORAL at 11:28

## 2023-01-01 RX ADMIN — Medication 1 MILLIGRAM(S): at 11:22

## 2023-01-01 RX ADMIN — Medication 325 MILLIGRAM(S): at 11:51

## 2023-01-01 RX ADMIN — SENNA PLUS 2 TABLET(S): 8.6 TABLET ORAL at 21:45

## 2023-01-01 RX ADMIN — LATANOPROST 1 DROP(S): 0.05 SOLUTION/ DROPS OPHTHALMIC; TOPICAL at 21:16

## 2023-01-01 RX ADMIN — POLYETHYLENE GLYCOL 3350 17 GRAM(S): 17 POWDER, FOR SOLUTION ORAL at 17:03

## 2023-01-01 RX ADMIN — CHLORHEXIDINE GLUCONATE 15 MILLILITER(S): 213 SOLUTION TOPICAL at 18:01

## 2023-01-01 RX ADMIN — LACTULOSE 10 GRAM(S): 10 SOLUTION ORAL at 23:04

## 2023-01-01 RX ADMIN — Medication 81 MILLIGRAM(S): at 12:36

## 2023-01-01 RX ADMIN — Medication 1 APPLICATION(S): at 06:05

## 2023-01-01 RX ADMIN — Medication 3: at 17:02

## 2023-01-01 RX ADMIN — ENOXAPARIN SODIUM 30 MILLIGRAM(S): 100 INJECTION SUBCUTANEOUS at 05:46

## 2023-01-01 RX ADMIN — MIDODRINE HYDROCHLORIDE 10 MILLIGRAM(S): 2.5 TABLET ORAL at 05:30

## 2023-01-01 RX ADMIN — Medication 325 MILLIGRAM(S): at 11:08

## 2023-01-01 RX ADMIN — FENTANYL CITRATE 3.25 MICROGRAM(S)/KG/HR: 50 INJECTION INTRAVENOUS at 10:27

## 2023-01-01 RX ADMIN — Medication 20 UNIT(S): at 09:19

## 2023-01-01 RX ADMIN — BUMETANIDE 2 MILLIGRAM(S): 0.25 INJECTION INTRAMUSCULAR; INTRAVENOUS at 06:31

## 2023-01-01 RX ADMIN — IRON SUCROSE 110 MILLIGRAM(S): 20 INJECTION, SOLUTION INTRAVENOUS at 14:38

## 2023-01-01 RX ADMIN — DULOXETINE HYDROCHLORIDE 30 MILLIGRAM(S): 30 CAPSULE, DELAYED RELEASE ORAL at 06:09

## 2023-01-01 RX ADMIN — Medication 125 MICROGRAM(S): at 05:09

## 2023-01-01 RX ADMIN — POLYETHYLENE GLYCOL 3350 17 GRAM(S): 17 POWDER, FOR SOLUTION ORAL at 05:18

## 2023-01-01 RX ADMIN — MIDODRINE HYDROCHLORIDE 10 MILLIGRAM(S): 2.5 TABLET ORAL at 11:18

## 2023-01-01 RX ADMIN — Medication 20 MILLIGRAM(S): at 05:29

## 2023-01-01 RX ADMIN — Medication 25 MICROGRAM(S): at 06:01

## 2023-01-01 RX ADMIN — Medication 250 MILLIGRAM(S): at 18:29

## 2023-01-01 RX ADMIN — Medication 1 APPLICATION(S): at 05:32

## 2023-01-01 RX ADMIN — POLYETHYLENE GLYCOL 3350 17 GRAM(S): 17 POWDER, FOR SOLUTION ORAL at 17:19

## 2023-01-01 RX ADMIN — DULOXETINE HYDROCHLORIDE 30 MILLIGRAM(S): 30 CAPSULE, DELAYED RELEASE ORAL at 06:14

## 2023-01-01 RX ADMIN — OXYCODONE HYDROCHLORIDE 5 MILLIGRAM(S): 5 TABLET ORAL at 14:00

## 2023-01-01 RX ADMIN — HEPARIN SODIUM 5000 UNIT(S): 5000 INJECTION INTRAVENOUS; SUBCUTANEOUS at 06:16

## 2023-01-01 RX ADMIN — DULOXETINE HYDROCHLORIDE 30 MILLIGRAM(S): 30 CAPSULE, DELAYED RELEASE ORAL at 20:34

## 2023-01-01 RX ADMIN — Medication 1 APPLICATION(S): at 06:04

## 2023-01-01 RX ADMIN — CHLORHEXIDINE GLUCONATE 1 APPLICATION(S): 213 SOLUTION TOPICAL at 11:43

## 2023-01-01 RX ADMIN — Medication 8: at 17:10

## 2023-01-01 RX ADMIN — Medication 1: at 17:06

## 2023-01-01 RX ADMIN — LATANOPROST 1 DROP(S): 0.05 SOLUTION/ DROPS OPHTHALMIC; TOPICAL at 23:14

## 2023-01-01 RX ADMIN — HALOPERIDOL DECANOATE 1 MILLIGRAM(S): 100 INJECTION INTRAMUSCULAR at 15:12

## 2023-01-01 RX ADMIN — DULOXETINE HYDROCHLORIDE 30 MILLIGRAM(S): 30 CAPSULE, DELAYED RELEASE ORAL at 22:00

## 2023-01-01 RX ADMIN — PRASUGREL 10 MILLIGRAM(S): 5 TABLET, FILM COATED ORAL at 11:55

## 2023-01-01 RX ADMIN — Medication 81 MILLIGRAM(S): at 11:18

## 2023-01-01 RX ADMIN — POLYETHYLENE GLYCOL 3350 17 GRAM(S): 17 POWDER, FOR SOLUTION ORAL at 18:12

## 2023-01-01 RX ADMIN — DULOXETINE HYDROCHLORIDE 30 MILLIGRAM(S): 30 CAPSULE, DELAYED RELEASE ORAL at 13:40

## 2023-01-01 RX ADMIN — Medication 250 MILLIGRAM(S): at 18:03

## 2023-01-01 RX ADMIN — Medication 1 ENEMA: at 02:50

## 2023-01-01 RX ADMIN — INSULIN HUMAN 10 UNIT(S): 100 INJECTION, SOLUTION SUBCUTANEOUS at 14:15

## 2023-01-01 RX ADMIN — METHOTREXATE 10 MILLIGRAM(S): 2.5 TABLET ORAL at 14:12

## 2023-01-01 RX ADMIN — Medication 2: at 12:20

## 2023-01-01 RX ADMIN — Medication 1 MILLIGRAM(S): at 22:34

## 2023-01-01 RX ADMIN — MIDODRINE HYDROCHLORIDE 10 MILLIGRAM(S): 2.5 TABLET ORAL at 17:15

## 2023-01-01 RX ADMIN — Medication 50 MILLIEQUIVALENT(S): at 09:21

## 2023-01-01 RX ADMIN — HEPARIN SODIUM 5000 UNIT(S): 5000 INJECTION INTRAVENOUS; SUBCUTANEOUS at 18:33

## 2023-01-01 RX ADMIN — DULOXETINE HYDROCHLORIDE 30 MILLIGRAM(S): 30 CAPSULE, DELAYED RELEASE ORAL at 05:53

## 2023-01-01 RX ADMIN — CHLORHEXIDINE GLUCONATE 1 APPLICATION(S): 213 SOLUTION TOPICAL at 11:19

## 2023-01-01 RX ADMIN — MIDODRINE HYDROCHLORIDE 5 MILLIGRAM(S): 2.5 TABLET ORAL at 06:25

## 2023-01-01 RX ADMIN — Medication 81 MILLIGRAM(S): at 12:27

## 2023-01-01 RX ADMIN — CHLORHEXIDINE GLUCONATE 1 APPLICATION(S): 213 SOLUTION TOPICAL at 14:14

## 2023-01-01 RX ADMIN — GABAPENTIN 100 MILLIGRAM(S): 400 CAPSULE ORAL at 06:01

## 2023-01-01 RX ADMIN — DULOXETINE HYDROCHLORIDE 30 MILLIGRAM(S): 30 CAPSULE, DELAYED RELEASE ORAL at 21:00

## 2023-01-01 RX ADMIN — POLYETHYLENE GLYCOL 3350 17 GRAM(S): 17 POWDER, FOR SOLUTION ORAL at 05:28

## 2023-01-01 RX ADMIN — Medication 2: at 17:11

## 2023-01-01 RX ADMIN — Medication 1 APPLICATION(S): at 16:56

## 2023-01-01 RX ADMIN — Medication 100 MILLIGRAM(S): at 05:45

## 2023-01-01 RX ADMIN — Medication 40 MILLIGRAM(S): at 05:08

## 2023-01-01 RX ADMIN — Medication 1 APPLICATION(S): at 17:54

## 2023-01-01 RX ADMIN — DULOXETINE HYDROCHLORIDE 30 MILLIGRAM(S): 30 CAPSULE, DELAYED RELEASE ORAL at 11:26

## 2023-01-01 RX ADMIN — Medication 25 MICROGRAM(S): at 06:05

## 2023-01-01 RX ADMIN — PRASUGREL 10 MILLIGRAM(S): 5 TABLET, FILM COATED ORAL at 14:13

## 2023-01-01 RX ADMIN — PANTOPRAZOLE SODIUM 40 MILLIGRAM(S): 20 TABLET, DELAYED RELEASE ORAL at 11:20

## 2023-01-01 RX ADMIN — ENOXAPARIN SODIUM 40 MILLIGRAM(S): 100 INJECTION SUBCUTANEOUS at 21:39

## 2023-01-01 RX ADMIN — Medication 25 MILLIGRAM(S): at 18:08

## 2023-01-01 RX ADMIN — PRASUGREL 10 MILLIGRAM(S): 5 TABLET, FILM COATED ORAL at 11:12

## 2023-01-01 RX ADMIN — CEFEPIME 100 MILLIGRAM(S): 1 INJECTION, POWDER, FOR SOLUTION INTRAMUSCULAR; INTRAVENOUS at 08:43

## 2023-01-01 RX ADMIN — Medication 325 MILLIGRAM(S): at 22:50

## 2023-01-01 RX ADMIN — BUMETANIDE 2 MILLIGRAM(S): 0.25 INJECTION INTRAMUSCULAR; INTRAVENOUS at 06:29

## 2023-01-01 RX ADMIN — Medication 325 MILLIGRAM(S): at 12:20

## 2023-01-01 RX ADMIN — DULOXETINE HYDROCHLORIDE 30 MILLIGRAM(S): 30 CAPSULE, DELAYED RELEASE ORAL at 13:19

## 2023-01-01 RX ADMIN — POLYETHYLENE GLYCOL 3350 17 GRAM(S): 17 POWDER, FOR SOLUTION ORAL at 17:12

## 2023-01-01 RX ADMIN — Medication 1 APPLICATION(S): at 18:33

## 2023-01-01 RX ADMIN — Medication 1 TABLET(S): at 11:55

## 2023-01-01 RX ADMIN — CHLORHEXIDINE GLUCONATE 1 APPLICATION(S): 213 SOLUTION TOPICAL at 12:21

## 2023-01-01 RX ADMIN — MUPIROCIN 1 APPLICATION(S): 20 OINTMENT TOPICAL at 17:56

## 2023-01-01 RX ADMIN — DULOXETINE HYDROCHLORIDE 30 MILLIGRAM(S): 30 CAPSULE, DELAYED RELEASE ORAL at 22:44

## 2023-01-01 RX ADMIN — Medication 1 APPLICATION(S): at 18:00

## 2023-01-01 RX ADMIN — SPIRONOLACTONE 25 MILLIGRAM(S): 25 TABLET, FILM COATED ORAL at 05:34

## 2023-01-01 RX ADMIN — MEROPENEM 100 MILLIGRAM(S): 1 INJECTION INTRAVENOUS at 06:01

## 2023-01-01 RX ADMIN — Medication 25 MICROGRAM(S): at 05:51

## 2023-01-01 RX ADMIN — CHLORHEXIDINE GLUCONATE 1 APPLICATION(S): 213 SOLUTION TOPICAL at 12:03

## 2023-01-01 RX ADMIN — Medication 1 TABLET(S): at 11:57

## 2023-01-01 RX ADMIN — Medication 50 MILLIEQUIVALENT(S): at 07:21

## 2023-01-01 RX ADMIN — PRASUGREL 10 MILLIGRAM(S): 5 TABLET, FILM COATED ORAL at 11:22

## 2023-01-01 RX ADMIN — CHLORHEXIDINE GLUCONATE 1 APPLICATION(S): 213 SOLUTION TOPICAL at 05:52

## 2023-01-01 RX ADMIN — Medication 1: at 07:57

## 2023-01-01 RX ADMIN — Medication 25 GRAM(S): at 15:21

## 2023-01-01 RX ADMIN — DULOXETINE HYDROCHLORIDE 30 MILLIGRAM(S): 30 CAPSULE, DELAYED RELEASE ORAL at 13:38

## 2023-01-01 RX ADMIN — MORPHINE SULFATE 2 MILLIGRAM(S): 50 CAPSULE, EXTENDED RELEASE ORAL at 09:00

## 2023-01-01 RX ADMIN — CEFEPIME 100 MILLIGRAM(S): 1 INJECTION, POWDER, FOR SOLUTION INTRAMUSCULAR; INTRAVENOUS at 06:45

## 2023-01-01 RX ADMIN — MIDODRINE HYDROCHLORIDE 5 MILLIGRAM(S): 2.5 TABLET ORAL at 17:12

## 2023-01-01 RX ADMIN — CHLORHEXIDINE GLUCONATE 1 APPLICATION(S): 213 SOLUTION TOPICAL at 05:47

## 2023-01-01 RX ADMIN — GABAPENTIN 100 MILLIGRAM(S): 400 CAPSULE ORAL at 05:26

## 2023-01-01 RX ADMIN — OXYCODONE HYDROCHLORIDE 10 MILLIGRAM(S): 5 TABLET ORAL at 18:35

## 2023-01-01 RX ADMIN — Medication 50 MILLILITER(S): at 06:28

## 2023-01-01 RX ADMIN — Medication 6.09 MICROGRAM(S)/KG/MIN: at 03:47

## 2023-01-01 RX ADMIN — VASOPRESSIN 6 UNIT(S)/MIN: 20 INJECTION INTRAVENOUS at 08:37

## 2023-01-01 RX ADMIN — DULOXETINE HYDROCHLORIDE 30 MILLIGRAM(S): 30 CAPSULE, DELAYED RELEASE ORAL at 13:16

## 2023-01-01 RX ADMIN — CHLORHEXIDINE GLUCONATE 1 APPLICATION(S): 213 SOLUTION TOPICAL at 11:33

## 2023-01-01 RX ADMIN — DULOXETINE HYDROCHLORIDE 30 MILLIGRAM(S): 30 CAPSULE, DELAYED RELEASE ORAL at 12:19

## 2023-01-01 RX ADMIN — PANTOPRAZOLE SODIUM 40 MILLIGRAM(S): 20 TABLET, DELAYED RELEASE ORAL at 06:31

## 2023-01-01 RX ADMIN — Medication 1 TABLET(S): at 11:45

## 2023-01-01 RX ADMIN — PANTOPRAZOLE SODIUM 40 MILLIGRAM(S): 20 TABLET, DELAYED RELEASE ORAL at 06:05

## 2023-01-01 RX ADMIN — LACTULOSE 10 GRAM(S): 10 SOLUTION ORAL at 21:46

## 2023-01-01 RX ADMIN — DULOXETINE HYDROCHLORIDE 30 MILLIGRAM(S): 30 CAPSULE, DELAYED RELEASE ORAL at 21:46

## 2023-01-01 RX ADMIN — Medication 1 APPLICATION(S): at 06:19

## 2023-01-01 RX ADMIN — Medication 50 MICROGRAM(S): at 06:14

## 2023-01-01 RX ADMIN — DULOXETINE HYDROCHLORIDE 30 MILLIGRAM(S): 30 CAPSULE, DELAYED RELEASE ORAL at 22:26

## 2023-01-01 RX ADMIN — Medication 10 MILLIGRAM(S): at 22:01

## 2023-01-01 RX ADMIN — Medication 1 MILLIGRAM(S): at 21:47

## 2023-01-01 RX ADMIN — PANTOPRAZOLE SODIUM 40 MILLIGRAM(S): 20 TABLET, DELAYED RELEASE ORAL at 05:02

## 2023-01-01 RX ADMIN — Medication 1 APPLICATION(S): at 06:30

## 2023-01-01 RX ADMIN — POLYETHYLENE GLYCOL 3350 17 GRAM(S): 17 POWDER, FOR SOLUTION ORAL at 18:00

## 2023-01-01 RX ADMIN — MIDODRINE HYDROCHLORIDE 5 MILLIGRAM(S): 2.5 TABLET ORAL at 06:13

## 2023-01-01 RX ADMIN — Medication 125 MICROGRAM(S): at 05:13

## 2023-01-01 RX ADMIN — Medication 40 MILLIGRAM(S): at 06:03

## 2023-01-01 RX ADMIN — Medication 1 MILLIGRAM(S): at 08:14

## 2023-01-01 RX ADMIN — POLYETHYLENE GLYCOL 3350 17 GRAM(S): 17 POWDER, FOR SOLUTION ORAL at 17:32

## 2023-01-01 RX ADMIN — MUPIROCIN 1 APPLICATION(S): 20 OINTMENT TOPICAL at 17:04

## 2023-01-01 RX ADMIN — Medication 100 MILLIEQUIVALENT(S): at 02:30

## 2023-01-01 RX ADMIN — DULOXETINE HYDROCHLORIDE 30 MILLIGRAM(S): 30 CAPSULE, DELAYED RELEASE ORAL at 17:03

## 2023-01-01 RX ADMIN — Medication 100 MILLIGRAM(S): at 22:36

## 2023-01-01 RX ADMIN — ATORVASTATIN CALCIUM 80 MILLIGRAM(S): 80 TABLET, FILM COATED ORAL at 21:40

## 2023-01-01 RX ADMIN — BUMETANIDE 2 MILLIGRAM(S): 0.25 INJECTION INTRAMUSCULAR; INTRAVENOUS at 17:48

## 2023-01-01 RX ADMIN — Medication 25 MILLIGRAM(S): at 05:13

## 2023-01-01 RX ADMIN — CEFEPIME 100 MILLIGRAM(S): 1 INJECTION, POWDER, FOR SOLUTION INTRAMUSCULAR; INTRAVENOUS at 13:14

## 2023-01-01 RX ADMIN — Medication 3000 GRAM(S): at 23:54

## 2023-01-01 RX ADMIN — LATANOPROST 1 DROP(S): 0.05 SOLUTION/ DROPS OPHTHALMIC; TOPICAL at 22:43

## 2023-01-01 RX ADMIN — Medication 25 MICROGRAM(S): at 05:03

## 2023-01-01 RX ADMIN — POLYETHYLENE GLYCOL 3350 17 GRAM(S): 17 POWDER, FOR SOLUTION ORAL at 06:00

## 2023-01-01 RX ADMIN — Medication 25 MICROGRAM(S): at 06:16

## 2023-01-01 RX ADMIN — CHLORHEXIDINE GLUCONATE 1 APPLICATION(S): 213 SOLUTION TOPICAL at 06:03

## 2023-01-01 RX ADMIN — CHLORHEXIDINE GLUCONATE 15 MILLILITER(S): 213 SOLUTION TOPICAL at 05:25

## 2023-01-01 RX ADMIN — Medication 1: at 11:33

## 2023-01-01 RX ADMIN — PRASUGREL 10 MILLIGRAM(S): 5 TABLET, FILM COATED ORAL at 11:11

## 2023-01-01 RX ADMIN — Medication 20 MILLIGRAM(S): at 17:28

## 2023-01-01 RX ADMIN — PANTOPRAZOLE SODIUM 40 MILLIGRAM(S): 20 TABLET, DELAYED RELEASE ORAL at 06:08

## 2023-01-01 RX ADMIN — Medication 50 MILLILITER(S): at 03:55

## 2023-01-01 RX ADMIN — Medication 2: at 11:29

## 2023-01-01 RX ADMIN — DULOXETINE HYDROCHLORIDE 30 MILLIGRAM(S): 30 CAPSULE, DELAYED RELEASE ORAL at 05:30

## 2023-01-01 RX ADMIN — Medication 50 MILLILITER(S): at 09:19

## 2023-01-01 RX ADMIN — INSULIN GLARGINE 5 UNIT(S): 100 INJECTION, SOLUTION SUBCUTANEOUS at 21:56

## 2023-01-01 RX ADMIN — Medication 2: at 16:30

## 2023-01-01 RX ADMIN — Medication 1 MILLIGRAM(S): at 22:07

## 2023-01-01 RX ADMIN — CEFEPIME 100 MILLIGRAM(S): 1 INJECTION, POWDER, FOR SOLUTION INTRAMUSCULAR; INTRAVENOUS at 21:12

## 2023-01-01 RX ADMIN — MUPIROCIN 1 APPLICATION(S): 20 OINTMENT TOPICAL at 18:05

## 2023-01-01 RX ADMIN — DULOXETINE HYDROCHLORIDE 30 MILLIGRAM(S): 30 CAPSULE, DELAYED RELEASE ORAL at 19:53

## 2023-01-01 RX ADMIN — CHLORHEXIDINE GLUCONATE 1 APPLICATION(S): 213 SOLUTION TOPICAL at 05:02

## 2023-01-01 RX ADMIN — Medication 25 MILLIGRAM(S): at 05:37

## 2023-01-01 RX ADMIN — PRASUGREL 10 MILLIGRAM(S): 5 TABLET, FILM COATED ORAL at 18:07

## 2023-01-01 RX ADMIN — TAMSULOSIN HYDROCHLORIDE 0.4 MILLIGRAM(S): 0.4 CAPSULE ORAL at 21:15

## 2023-01-01 RX ADMIN — DULOXETINE HYDROCHLORIDE 30 MILLIGRAM(S): 30 CAPSULE, DELAYED RELEASE ORAL at 05:54

## 2023-01-01 RX ADMIN — PANTOPRAZOLE SODIUM 40 MILLIGRAM(S): 20 TABLET, DELAYED RELEASE ORAL at 05:22

## 2023-01-01 RX ADMIN — Medication 81 MILLIGRAM(S): at 12:19

## 2023-01-01 RX ADMIN — LOSARTAN POTASSIUM 25 MILLIGRAM(S): 100 TABLET, FILM COATED ORAL at 17:18

## 2023-01-01 RX ADMIN — DULOXETINE HYDROCHLORIDE 30 MILLIGRAM(S): 30 CAPSULE, DELAYED RELEASE ORAL at 12:28

## 2023-01-01 RX ADMIN — Medication 81 MILLIGRAM(S): at 11:06

## 2023-01-01 RX ADMIN — LACTULOSE 10 GRAM(S): 10 SOLUTION ORAL at 21:45

## 2023-01-01 RX ADMIN — DULOXETINE HYDROCHLORIDE 30 MILLIGRAM(S): 30 CAPSULE, DELAYED RELEASE ORAL at 12:03

## 2023-01-01 RX ADMIN — ATORVASTATIN CALCIUM 80 MILLIGRAM(S): 80 TABLET, FILM COATED ORAL at 22:30

## 2023-01-01 RX ADMIN — DULOXETINE HYDROCHLORIDE 30 MILLIGRAM(S): 30 CAPSULE, DELAYED RELEASE ORAL at 22:40

## 2023-01-01 RX ADMIN — HEPARIN SODIUM 5000 UNIT(S): 5000 INJECTION INTRAVENOUS; SUBCUTANEOUS at 17:17

## 2023-01-01 RX ADMIN — ATORVASTATIN CALCIUM 80 MILLIGRAM(S): 80 TABLET, FILM COATED ORAL at 21:00

## 2023-01-01 RX ADMIN — PANTOPRAZOLE SODIUM 40 MILLIGRAM(S): 20 TABLET, DELAYED RELEASE ORAL at 05:48

## 2023-01-01 RX ADMIN — MIDODRINE HYDROCHLORIDE 10 MILLIGRAM(S): 2.5 TABLET ORAL at 05:52

## 2023-01-01 RX ADMIN — DULOXETINE HYDROCHLORIDE 30 MILLIGRAM(S): 30 CAPSULE, DELAYED RELEASE ORAL at 12:09

## 2023-01-01 RX ADMIN — Medication 1 APPLICATION(S): at 05:24

## 2023-01-01 RX ADMIN — MUPIROCIN 1 APPLICATION(S): 20 OINTMENT TOPICAL at 06:29

## 2023-01-01 RX ADMIN — PANTOPRAZOLE SODIUM 40 MILLIGRAM(S): 20 TABLET, DELAYED RELEASE ORAL at 11:50

## 2023-01-01 RX ADMIN — POLYETHYLENE GLYCOL 3350 17 GRAM(S): 17 POWDER, FOR SOLUTION ORAL at 18:07

## 2023-01-01 RX ADMIN — PRASUGREL 10 MILLIGRAM(S): 5 TABLET, FILM COATED ORAL at 17:27

## 2023-01-01 RX ADMIN — Medication 6.38 MICROGRAM(S)/KG/MIN: at 14:47

## 2023-01-01 RX ADMIN — Medication 25 MILLIGRAM(S): at 05:54

## 2023-01-01 RX ADMIN — OXYCODONE HYDROCHLORIDE 10 MILLIGRAM(S): 5 TABLET ORAL at 15:55

## 2023-01-01 RX ADMIN — SENNA PLUS 2 TABLET(S): 8.6 TABLET ORAL at 22:51

## 2023-01-01 RX ADMIN — MUPIROCIN 1 APPLICATION(S): 20 OINTMENT TOPICAL at 06:19

## 2023-01-01 RX ADMIN — Medication 1 MILLIGRAM(S): at 05:34

## 2023-01-01 RX ADMIN — PANTOPRAZOLE SODIUM 40 MILLIGRAM(S): 20 TABLET, DELAYED RELEASE ORAL at 12:55

## 2023-01-01 RX ADMIN — Medication 1 APPLICATION(S): at 18:28

## 2023-01-01 RX ADMIN — Medication 1 APPLICATION(S): at 05:20

## 2023-01-01 RX ADMIN — Medication 50 MILLIEQUIVALENT(S): at 02:30

## 2023-01-01 RX ADMIN — ENOXAPARIN SODIUM 40 MILLIGRAM(S): 100 INJECTION SUBCUTANEOUS at 21:58

## 2023-01-01 RX ADMIN — HALOPERIDOL DECANOATE 3 MILLIGRAM(S): 100 INJECTION INTRAMUSCULAR at 00:45

## 2023-01-01 RX ADMIN — INSULIN GLARGINE 5 UNIT(S): 100 INJECTION, SOLUTION SUBCUTANEOUS at 21:44

## 2023-01-01 RX ADMIN — DULOXETINE HYDROCHLORIDE 30 MILLIGRAM(S): 30 CAPSULE, DELAYED RELEASE ORAL at 05:57

## 2023-01-01 RX ADMIN — Medication 2: at 17:22

## 2023-01-01 RX ADMIN — Medication 4: at 11:47

## 2023-01-01 RX ADMIN — Medication 10 MILLIGRAM(S): at 22:29

## 2023-01-01 RX ADMIN — Medication 1 TABLET(S): at 12:21

## 2023-01-01 RX ADMIN — MUPIROCIN 1 APPLICATION(S): 20 OINTMENT TOPICAL at 18:59

## 2023-01-01 RX ADMIN — OXYCODONE HYDROCHLORIDE 10 MILLIGRAM(S): 5 TABLET ORAL at 21:52

## 2023-01-01 RX ADMIN — DULOXETINE HYDROCHLORIDE 30 MILLIGRAM(S): 30 CAPSULE, DELAYED RELEASE ORAL at 21:43

## 2023-01-01 RX ADMIN — Medication 25 MICROGRAM(S): at 05:47

## 2023-01-01 RX ADMIN — HALOPERIDOL DECANOATE 2 MILLIGRAM(S): 100 INJECTION INTRAMUSCULAR at 21:12

## 2023-01-01 RX ADMIN — Medication 325 MILLIGRAM(S): at 13:11

## 2023-01-01 RX ADMIN — HEPARIN SODIUM 5000 UNIT(S): 5000 INJECTION INTRAVENOUS; SUBCUTANEOUS at 17:31

## 2023-01-01 RX ADMIN — CHLORHEXIDINE GLUCONATE 1 APPLICATION(S): 213 SOLUTION TOPICAL at 06:17

## 2023-01-01 RX ADMIN — Medication 325 MILLIGRAM(S): at 15:55

## 2023-01-01 RX ADMIN — MUPIROCIN 1 APPLICATION(S): 20 OINTMENT TOPICAL at 06:22

## 2023-01-01 RX ADMIN — CEFTRIAXONE 100 MILLIGRAM(S): 500 INJECTION, POWDER, FOR SOLUTION INTRAMUSCULAR; INTRAVENOUS at 16:58

## 2023-01-01 RX ADMIN — DULOXETINE HYDROCHLORIDE 30 MILLIGRAM(S): 30 CAPSULE, DELAYED RELEASE ORAL at 13:24

## 2023-01-01 RX ADMIN — Medication 25 MICROGRAM(S): at 05:27

## 2023-01-01 RX ADMIN — Medication 1 MILLIGRAM(S): at 20:34

## 2023-01-01 RX ADMIN — Medication 50 MILLILITER(S): at 08:59

## 2023-01-01 RX ADMIN — CHLORHEXIDINE GLUCONATE 1 APPLICATION(S): 213 SOLUTION TOPICAL at 06:45

## 2023-01-01 RX ADMIN — Medication 25 MICROGRAM(S): at 11:17

## 2023-01-01 RX ADMIN — Medication 250 MICROGRAM(S): at 12:24

## 2023-01-01 RX ADMIN — HEPARIN SODIUM 5000 UNIT(S): 5000 INJECTION INTRAVENOUS; SUBCUTANEOUS at 05:34

## 2023-01-01 RX ADMIN — POLYETHYLENE GLYCOL 3350 17 GRAM(S): 17 POWDER, FOR SOLUTION ORAL at 05:21

## 2023-01-01 RX ADMIN — DULOXETINE HYDROCHLORIDE 30 MILLIGRAM(S): 30 CAPSULE, DELAYED RELEASE ORAL at 20:38

## 2023-01-01 RX ADMIN — CHLORHEXIDINE GLUCONATE 1 APPLICATION(S): 213 SOLUTION TOPICAL at 11:08

## 2023-01-01 RX ADMIN — PRASUGREL 10 MILLIGRAM(S): 5 TABLET, FILM COATED ORAL at 12:35

## 2023-01-01 RX ADMIN — Medication 975 MILLIGRAM(S): at 12:46

## 2023-01-01 RX ADMIN — Medication 25 MICROGRAM(S): at 06:31

## 2023-01-01 RX ADMIN — Medication 20 MILLIGRAM(S): at 13:22

## 2023-01-01 RX ADMIN — PRASUGREL 10 MILLIGRAM(S): 5 TABLET, FILM COATED ORAL at 12:19

## 2023-01-01 RX ADMIN — INSULIN GLARGINE 12 UNIT(S): 100 INJECTION, SOLUTION SUBCUTANEOUS at 21:48

## 2023-01-01 RX ADMIN — IRON SUCROSE 110 MILLIGRAM(S): 20 INJECTION, SOLUTION INTRAVENOUS at 12:46

## 2023-01-01 RX ADMIN — Medication 40 MILLIGRAM(S): at 05:47

## 2023-01-01 RX ADMIN — GABAPENTIN 100 MILLIGRAM(S): 400 CAPSULE ORAL at 17:01

## 2023-01-01 RX ADMIN — Medication 1 APPLICATION(S): at 06:31

## 2023-01-01 RX ADMIN — Medication 25 MILLIGRAM(S): at 06:26

## 2023-01-01 RX ADMIN — Medication 81 MILLIGRAM(S): at 11:11

## 2023-01-01 RX ADMIN — IRON SUCROSE 110 MILLIGRAM(S): 20 INJECTION, SOLUTION INTRAVENOUS at 17:18

## 2023-01-01 RX ADMIN — Medication 1: at 05:30

## 2023-01-01 RX ADMIN — DULOXETINE HYDROCHLORIDE 30 MILLIGRAM(S): 30 CAPSULE, DELAYED RELEASE ORAL at 13:00

## 2023-01-01 RX ADMIN — MIDODRINE HYDROCHLORIDE 10 MILLIGRAM(S): 2.5 TABLET ORAL at 11:32

## 2023-01-01 RX ADMIN — OXYCODONE HYDROCHLORIDE 10 MILLIGRAM(S): 5 TABLET ORAL at 09:02

## 2023-01-01 RX ADMIN — Medication 25 MILLIGRAM(S): at 05:53

## 2023-01-01 RX ADMIN — MEROPENEM 280 MILLIGRAM(S): 1 INJECTION INTRAVENOUS at 06:00

## 2023-01-01 RX ADMIN — LACTULOSE 10 GRAM(S): 10 SOLUTION ORAL at 22:35

## 2023-01-01 RX ADMIN — Medication 4: at 12:30

## 2023-01-01 RX ADMIN — BUMETANIDE 2 MILLIGRAM(S): 0.25 INJECTION INTRAMUSCULAR; INTRAVENOUS at 06:34

## 2023-01-01 RX ADMIN — MUPIROCIN 1 APPLICATION(S): 20 OINTMENT TOPICAL at 17:50

## 2023-01-01 RX ADMIN — MEROPENEM 100 MILLIGRAM(S): 1 INJECTION INTRAVENOUS at 18:23

## 2023-01-01 RX ADMIN — SPIRONOLACTONE 12.5 MILLIGRAM(S): 25 TABLET, FILM COATED ORAL at 13:25

## 2023-01-01 RX ADMIN — ATORVASTATIN CALCIUM 80 MILLIGRAM(S): 80 TABLET, FILM COATED ORAL at 22:44

## 2023-01-01 RX ADMIN — Medication 25 MILLIGRAM(S): at 11:22

## 2023-01-01 RX ADMIN — Medication 25 MICROGRAM(S): at 05:28

## 2023-01-01 RX ADMIN — HEPARIN SODIUM 5000 UNIT(S): 5000 INJECTION INTRAVENOUS; SUBCUTANEOUS at 17:50

## 2023-01-01 RX ADMIN — DULOXETINE HYDROCHLORIDE 30 MILLIGRAM(S): 30 CAPSULE, DELAYED RELEASE ORAL at 12:23

## 2023-01-01 RX ADMIN — Medication 25 MILLIGRAM(S): at 06:18

## 2023-01-01 RX ADMIN — SODIUM CHLORIDE 50 MILLILITER(S): 5 INJECTION, SOLUTION INTRAVENOUS at 18:42

## 2023-01-01 RX ADMIN — Medication 81 MILLIGRAM(S): at 12:03

## 2023-01-01 RX ADMIN — Medication 1 APPLICATION(S): at 17:24

## 2023-01-01 RX ADMIN — BUMETANIDE 2 MILLIGRAM(S): 0.25 INJECTION INTRAMUSCULAR; INTRAVENOUS at 05:36

## 2023-01-01 RX ADMIN — POLYETHYLENE GLYCOL 3350 17 GRAM(S): 17 POWDER, FOR SOLUTION ORAL at 05:36

## 2023-01-01 RX ADMIN — SODIUM CHLORIDE 50 MILLILITER(S): 5 INJECTION, SOLUTION INTRAVENOUS at 06:07

## 2023-01-01 RX ADMIN — Medication 25 MICROGRAM(S): at 05:35

## 2023-01-01 RX ADMIN — Medication 1 APPLICATION(S): at 18:32

## 2023-01-01 RX ADMIN — Medication 1 APPLICATION(S): at 05:03

## 2023-01-01 RX ADMIN — Medication 25 MICROGRAM(S): at 05:53

## 2023-01-01 RX ADMIN — OXYCODONE HYDROCHLORIDE 10 MILLIGRAM(S): 5 TABLET ORAL at 10:02

## 2023-01-01 RX ADMIN — Medication 1 APPLICATION(S): at 17:04

## 2023-01-01 RX ADMIN — Medication 2: at 17:07

## 2023-01-01 RX ADMIN — Medication 125 MICROGRAM(S): at 05:47

## 2023-01-01 RX ADMIN — MUPIROCIN 1 APPLICATION(S): 20 OINTMENT TOPICAL at 06:17

## 2023-01-01 RX ADMIN — Medication 25 MICROGRAM(S): at 05:33

## 2023-01-01 RX ADMIN — MIDODRINE HYDROCHLORIDE 10 MILLIGRAM(S): 2.5 TABLET ORAL at 06:09

## 2023-01-01 RX ADMIN — Medication 2: at 12:48

## 2023-01-01 RX ADMIN — DULOXETINE HYDROCHLORIDE 30 MILLIGRAM(S): 30 CAPSULE, DELAYED RELEASE ORAL at 05:58

## 2023-01-01 RX ADMIN — IRON SUCROSE 110 MILLIGRAM(S): 20 INJECTION, SOLUTION INTRAVENOUS at 12:19

## 2023-01-01 RX ADMIN — Medication 1 APPLICATION(S): at 05:04

## 2023-01-01 RX ADMIN — SODIUM CHLORIDE 50 MILLILITER(S): 9 INJECTION, SOLUTION INTRAVENOUS at 15:52

## 2023-01-01 RX ADMIN — Medication 81 MILLIGRAM(S): at 14:30

## 2023-01-01 RX ADMIN — DULOXETINE HYDROCHLORIDE 30 MILLIGRAM(S): 30 CAPSULE, DELAYED RELEASE ORAL at 20:04

## 2023-01-01 RX ADMIN — MEROPENEM 100 MILLIGRAM(S): 1 INJECTION INTRAVENOUS at 17:00

## 2023-01-01 RX ADMIN — MIDODRINE HYDROCHLORIDE 10 MILLIGRAM(S): 2.5 TABLET ORAL at 17:10

## 2023-01-01 RX ADMIN — OXYCODONE HYDROCHLORIDE 10 MILLIGRAM(S): 5 TABLET ORAL at 05:50

## 2023-01-01 RX ADMIN — ENOXAPARIN SODIUM 40 MILLIGRAM(S): 100 INJECTION SUBCUTANEOUS at 21:16

## 2023-01-01 RX ADMIN — HEPARIN SODIUM 5000 UNIT(S): 5000 INJECTION INTRAVENOUS; SUBCUTANEOUS at 17:28

## 2023-01-01 RX ADMIN — MIDODRINE HYDROCHLORIDE 5 MILLIGRAM(S): 2.5 TABLET ORAL at 17:32

## 2023-01-01 RX ADMIN — Medication 2: at 11:38

## 2023-01-01 RX ADMIN — ENOXAPARIN SODIUM 40 MILLIGRAM(S): 100 INJECTION SUBCUTANEOUS at 21:12

## 2023-01-01 RX ADMIN — Medication 166.67 MILLIGRAM(S): at 21:02

## 2023-01-01 RX ADMIN — HEPARIN SODIUM 5000 UNIT(S): 5000 INJECTION INTRAVENOUS; SUBCUTANEOUS at 16:44

## 2023-01-01 RX ADMIN — Medication 25 MICROGRAM(S): at 06:04

## 2023-01-01 RX ADMIN — BUMETANIDE 2 MILLIGRAM(S): 0.25 INJECTION INTRAMUSCULAR; INTRAVENOUS at 18:31

## 2023-01-01 RX ADMIN — Medication 81 MILLIGRAM(S): at 11:51

## 2023-01-01 RX ADMIN — Medication 81 MILLIGRAM(S): at 12:58

## 2023-01-01 RX ADMIN — GABAPENTIN 100 MILLIGRAM(S): 400 CAPSULE ORAL at 05:31

## 2023-01-01 RX ADMIN — Medication 1 MILLIGRAM(S): at 21:52

## 2023-01-01 RX ADMIN — Medication 81 MILLIGRAM(S): at 13:14

## 2023-01-01 RX ADMIN — Medication 1 APPLICATION(S): at 17:15

## 2023-01-01 RX ADMIN — OLANZAPINE 5 MILLIGRAM(S): 15 TABLET, FILM COATED ORAL at 23:33

## 2023-01-01 RX ADMIN — PRASUGREL 10 MILLIGRAM(S): 5 TABLET, FILM COATED ORAL at 17:01

## 2023-01-01 RX ADMIN — HEPARIN SODIUM 5000 UNIT(S): 5000 INJECTION INTRAVENOUS; SUBCUTANEOUS at 05:53

## 2023-01-01 RX ADMIN — Medication 20 MILLIGRAM(S): at 06:16

## 2023-01-01 RX ADMIN — Medication 1 APPLICATION(S): at 06:45

## 2023-01-01 RX ADMIN — HEPARIN SODIUM 5000 UNIT(S): 5000 INJECTION INTRAVENOUS; SUBCUTANEOUS at 18:20

## 2023-01-01 RX ADMIN — Medication 250 MICROGRAM(S): at 06:45

## 2023-01-01 RX ADMIN — GABAPENTIN 100 MILLIGRAM(S): 400 CAPSULE ORAL at 17:05

## 2023-01-01 RX ADMIN — Medication 125 MICROGRAM(S): at 05:01

## 2023-01-01 RX ADMIN — Medication 25 MICROGRAM(S): at 06:12

## 2023-01-01 RX ADMIN — HEPARIN SODIUM 5000 UNIT(S): 5000 INJECTION INTRAVENOUS; SUBCUTANEOUS at 05:36

## 2023-01-01 RX ADMIN — DULOXETINE HYDROCHLORIDE 30 MILLIGRAM(S): 30 CAPSULE, DELAYED RELEASE ORAL at 11:48

## 2023-01-01 RX ADMIN — CEFEPIME 100 MILLIGRAM(S): 1 INJECTION, POWDER, FOR SOLUTION INTRAMUSCULAR; INTRAVENOUS at 21:47

## 2023-01-01 RX ADMIN — Medication 1 APPLICATION(S): at 18:58

## 2023-01-01 RX ADMIN — Medication 20 MILLIGRAM(S): at 07:44

## 2023-01-01 RX ADMIN — CHLORHEXIDINE GLUCONATE 15 MILLILITER(S): 213 SOLUTION TOPICAL at 05:17

## 2023-01-01 RX ADMIN — Medication 100 MILLIGRAM(S): at 06:06

## 2023-01-01 RX ADMIN — INSULIN GLARGINE 12 UNIT(S): 100 INJECTION, SOLUTION SUBCUTANEOUS at 08:17

## 2023-01-01 RX ADMIN — DULOXETINE HYDROCHLORIDE 30 MILLIGRAM(S): 30 CAPSULE, DELAYED RELEASE ORAL at 06:54

## 2023-01-01 RX ADMIN — BUMETANIDE 2 MILLIGRAM(S): 0.25 INJECTION INTRAMUSCULAR; INTRAVENOUS at 08:52

## 2023-01-01 RX ADMIN — PANTOPRAZOLE SODIUM 40 MILLIGRAM(S): 20 TABLET, DELAYED RELEASE ORAL at 08:40

## 2023-01-01 RX ADMIN — Medication 25 GRAM(S): at 18:43

## 2023-01-01 RX ADMIN — DULOXETINE HYDROCHLORIDE 30 MILLIGRAM(S): 30 CAPSULE, DELAYED RELEASE ORAL at 14:22

## 2023-01-01 RX ADMIN — Medication 10 MILLIGRAM(S): at 21:30

## 2023-01-01 RX ADMIN — Medication 4: at 17:50

## 2023-01-01 RX ADMIN — INSULIN GLARGINE 20 UNIT(S): 100 INJECTION, SOLUTION SUBCUTANEOUS at 22:46

## 2023-01-01 RX ADMIN — MUPIROCIN 1 APPLICATION(S): 20 OINTMENT TOPICAL at 06:03

## 2023-01-01 RX ADMIN — MIDODRINE HYDROCHLORIDE 5 MILLIGRAM(S): 2.5 TABLET ORAL at 06:20

## 2023-01-01 RX ADMIN — Medication 50 MILLILITER(S): at 17:01

## 2023-01-01 RX ADMIN — MIDODRINE HYDROCHLORIDE 5 MILLIGRAM(S): 2.5 TABLET ORAL at 18:07

## 2023-01-01 RX ADMIN — Medication 2: at 11:23

## 2023-01-01 RX ADMIN — Medication 1 TABLET(S): at 11:56

## 2023-01-01 RX ADMIN — MORPHINE SULFATE 2 MILLIGRAM(S): 50 CAPSULE, EXTENDED RELEASE ORAL at 08:20

## 2023-01-01 RX ADMIN — POLYETHYLENE GLYCOL 3350 17 GRAM(S): 17 POWDER, FOR SOLUTION ORAL at 16:39

## 2023-01-01 RX ADMIN — Medication 1 MILLIGRAM(S): at 08:16

## 2023-01-01 RX ADMIN — Medication 4: at 17:16

## 2023-01-01 RX ADMIN — Medication 25 MILLIGRAM(S): at 05:03

## 2023-01-01 RX ADMIN — Medication 1 APPLICATION(S): at 14:09

## 2023-01-01 RX ADMIN — IRON SUCROSE 110 MILLIGRAM(S): 20 INJECTION, SOLUTION INTRAVENOUS at 13:37

## 2023-01-01 RX ADMIN — MEROPENEM 100 MILLIGRAM(S): 1 INJECTION INTRAVENOUS at 17:49

## 2023-01-01 RX ADMIN — TAMSULOSIN HYDROCHLORIDE 0.4 MILLIGRAM(S): 0.4 CAPSULE ORAL at 21:29

## 2023-01-01 RX ADMIN — POLYETHYLENE GLYCOL 3350 17 GRAM(S): 17 POWDER, FOR SOLUTION ORAL at 06:30

## 2023-01-01 RX ADMIN — Medication 50 MILLILITER(S): at 22:03

## 2023-01-01 RX ADMIN — DULOXETINE HYDROCHLORIDE 30 MILLIGRAM(S): 30 CAPSULE, DELAYED RELEASE ORAL at 12:35

## 2023-01-01 RX ADMIN — Medication 1 APPLICATION(S): at 06:26

## 2023-01-01 RX ADMIN — HEPARIN SODIUM 5000 UNIT(S): 5000 INJECTION INTRAVENOUS; SUBCUTANEOUS at 05:54

## 2023-01-01 RX ADMIN — SODIUM CHLORIDE 75 MILLILITER(S): 9 INJECTION, SOLUTION INTRAVENOUS at 06:11

## 2023-01-01 RX ADMIN — SPIRONOLACTONE 12.5 MILLIGRAM(S): 25 TABLET, FILM COATED ORAL at 05:29

## 2023-01-01 RX ADMIN — POLYETHYLENE GLYCOL 3350 17 GRAM(S): 17 POWDER, FOR SOLUTION ORAL at 18:21

## 2023-01-01 RX ADMIN — MEROPENEM 100 MILLIGRAM(S): 1 INJECTION INTRAVENOUS at 16:40

## 2023-01-01 RX ADMIN — DULOXETINE HYDROCHLORIDE 30 MILLIGRAM(S): 30 CAPSULE, DELAYED RELEASE ORAL at 11:17

## 2023-01-01 RX ADMIN — MUPIROCIN 1 APPLICATION(S): 20 OINTMENT TOPICAL at 06:16

## 2023-01-01 RX ADMIN — SODIUM ZIRCONIUM CYCLOSILICATE 10 GRAM(S): 10 POWDER, FOR SUSPENSION ORAL at 23:01

## 2023-01-01 RX ADMIN — DULOXETINE HYDROCHLORIDE 30 MILLIGRAM(S): 30 CAPSULE, DELAYED RELEASE ORAL at 05:25

## 2023-01-01 RX ADMIN — Medication 1 TABLET(S): at 11:32

## 2023-01-01 RX ADMIN — DULOXETINE HYDROCHLORIDE 30 MILLIGRAM(S): 30 CAPSULE, DELAYED RELEASE ORAL at 05:09

## 2023-01-01 RX ADMIN — Medication 1: at 11:59

## 2023-01-01 RX ADMIN — Medication 25 MILLIGRAM(S): at 05:46

## 2023-01-01 RX ADMIN — Medication 520 MILLIGRAM(S): at 22:35

## 2023-01-01 RX ADMIN — PRASUGREL 10 MILLIGRAM(S): 5 TABLET, FILM COATED ORAL at 11:26

## 2023-01-01 RX ADMIN — SENNA PLUS 2 TABLET(S): 8.6 TABLET ORAL at 21:48

## 2023-01-01 RX ADMIN — Medication 81 MILLIGRAM(S): at 12:06

## 2023-01-01 RX ADMIN — PANTOPRAZOLE SODIUM 40 MILLIGRAM(S): 20 TABLET, DELAYED RELEASE ORAL at 05:53

## 2023-01-01 RX ADMIN — PRASUGREL 10 MILLIGRAM(S): 5 TABLET, FILM COATED ORAL at 12:08

## 2023-01-01 RX ADMIN — DEXMEDETOMIDINE HYDROCHLORIDE IN 0.9% SODIUM CHLORIDE 3.25 MICROGRAM(S)/KG/HR: 4 INJECTION INTRAVENOUS at 10:27

## 2023-01-01 RX ADMIN — DULOXETINE HYDROCHLORIDE 30 MILLIGRAM(S): 30 CAPSULE, DELAYED RELEASE ORAL at 22:02

## 2023-01-01 RX ADMIN — Medication 1 MILLIGRAM(S): at 00:45

## 2023-01-01 RX ADMIN — DULOXETINE HYDROCHLORIDE 30 MILLIGRAM(S): 30 CAPSULE, DELAYED RELEASE ORAL at 23:01

## 2023-01-01 RX ADMIN — Medication 40 MILLIGRAM(S): at 07:26

## 2023-01-01 RX ADMIN — INSULIN GLARGINE 12 UNIT(S): 100 INJECTION, SOLUTION SUBCUTANEOUS at 07:44

## 2023-01-01 RX ADMIN — MIDODRINE HYDROCHLORIDE 10 MILLIGRAM(S): 2.5 TABLET ORAL at 05:57

## 2023-01-01 RX ADMIN — SODIUM CHLORIDE 50 MILLILITER(S): 5 INJECTION, SOLUTION INTRAVENOUS at 06:01

## 2023-01-01 RX ADMIN — Medication 1 MILLIGRAM(S): at 21:33

## 2023-01-01 RX ADMIN — SODIUM ZIRCONIUM CYCLOSILICATE 10 GRAM(S): 10 POWDER, FOR SUSPENSION ORAL at 09:37

## 2023-01-01 RX ADMIN — Medication 40 MILLIGRAM(S): at 05:03

## 2023-01-01 RX ADMIN — SODIUM CHLORIDE 250 MILLILITER(S): 9 INJECTION INTRAMUSCULAR; INTRAVENOUS; SUBCUTANEOUS at 12:21

## 2023-01-01 RX ADMIN — Medication 1 MILLIGRAM(S): at 07:12

## 2023-01-01 RX ADMIN — HEPARIN SODIUM 5000 UNIT(S): 5000 INJECTION INTRAVENOUS; SUBCUTANEOUS at 17:43

## 2023-01-01 RX ADMIN — Medication 20 MILLIGRAM(S): at 17:18

## 2023-01-01 RX ADMIN — Medication 4: at 12:06

## 2023-01-01 RX ADMIN — DULOXETINE HYDROCHLORIDE 30 MILLIGRAM(S): 30 CAPSULE, DELAYED RELEASE ORAL at 05:13

## 2023-01-01 RX ADMIN — Medication 1 APPLICATION(S): at 05:31

## 2023-01-01 RX ADMIN — SENNA PLUS 2 TABLET(S): 8.6 TABLET ORAL at 21:30

## 2023-01-01 RX ADMIN — Medication 81 MILLIGRAM(S): at 14:33

## 2023-01-01 RX ADMIN — Medication 100 MILLIGRAM(S): at 13:07

## 2023-01-01 RX ADMIN — MIDODRINE HYDROCHLORIDE 10 MILLIGRAM(S): 2.5 TABLET ORAL at 06:34

## 2023-01-01 RX ADMIN — Medication 1 APPLICATION(S): at 05:46

## 2023-01-01 RX ADMIN — MUPIROCIN 1 APPLICATION(S): 20 OINTMENT TOPICAL at 17:34

## 2023-01-01 RX ADMIN — HEPARIN SODIUM 5000 UNIT(S): 5000 INJECTION INTRAVENOUS; SUBCUTANEOUS at 17:12

## 2023-01-01 RX ADMIN — Medication 250 MILLIGRAM(S): at 06:05

## 2023-01-01 RX ADMIN — CHLORHEXIDINE GLUCONATE 1 APPLICATION(S): 213 SOLUTION TOPICAL at 16:57

## 2023-01-01 RX ADMIN — HEPARIN SODIUM 5000 UNIT(S): 5000 INJECTION INTRAVENOUS; SUBCUTANEOUS at 06:27

## 2023-01-01 RX ADMIN — DULOXETINE HYDROCHLORIDE 30 MILLIGRAM(S): 30 CAPSULE, DELAYED RELEASE ORAL at 15:45

## 2023-01-01 RX ADMIN — Medication 3: at 16:41

## 2023-01-01 RX ADMIN — PRASUGREL 10 MILLIGRAM(S): 5 TABLET, FILM COATED ORAL at 11:44

## 2023-01-01 RX ADMIN — MIDODRINE HYDROCHLORIDE 5 MILLIGRAM(S): 2.5 TABLET ORAL at 06:43

## 2023-01-01 RX ADMIN — Medication 1 APPLICATION(S): at 18:12

## 2023-01-01 RX ADMIN — Medication 1 APPLICATION(S): at 05:10

## 2023-01-01 RX ADMIN — Medication 25 MILLIGRAM(S): at 05:47

## 2023-01-01 RX ADMIN — Medication 40 MILLIGRAM(S): at 13:00

## 2023-01-01 RX ADMIN — Medication 1 TABLET(S): at 13:14

## 2023-01-01 RX ADMIN — PRASUGREL 10 MILLIGRAM(S): 5 TABLET, FILM COATED ORAL at 14:35

## 2023-01-01 RX ADMIN — Medication 1 APPLICATION(S): at 06:06

## 2023-01-01 RX ADMIN — Medication 20 UNIT(S): at 11:54

## 2023-01-01 RX ADMIN — DULOXETINE HYDROCHLORIDE 30 MILLIGRAM(S): 30 CAPSULE, DELAYED RELEASE ORAL at 06:12

## 2023-01-01 RX ADMIN — Medication 50 MILLIEQUIVALENT(S): at 06:13

## 2023-01-01 RX ADMIN — DULOXETINE HYDROCHLORIDE 30 MILLIGRAM(S): 30 CAPSULE, DELAYED RELEASE ORAL at 22:42

## 2023-01-01 RX ADMIN — Medication 50 MILLILITER(S): at 16:20

## 2023-01-01 RX ADMIN — Medication 81 MILLIGRAM(S): at 11:43

## 2023-01-01 RX ADMIN — Medication 250 MICROGRAM(S): at 06:04

## 2023-01-01 RX ADMIN — Medication 1 APPLICATION(S): at 05:12

## 2023-01-01 RX ADMIN — DULOXETINE HYDROCHLORIDE 30 MILLIGRAM(S): 30 CAPSULE, DELAYED RELEASE ORAL at 12:36

## 2023-01-01 RX ADMIN — DULOXETINE HYDROCHLORIDE 30 MILLIGRAM(S): 30 CAPSULE, DELAYED RELEASE ORAL at 06:06

## 2023-01-01 RX ADMIN — CHLORHEXIDINE GLUCONATE 1 APPLICATION(S): 213 SOLUTION TOPICAL at 12:04

## 2023-01-01 RX ADMIN — PRASUGREL 10 MILLIGRAM(S): 5 TABLET, FILM COATED ORAL at 12:06

## 2023-01-01 RX ADMIN — Medication 25 MILLIGRAM(S): at 06:38

## 2023-01-01 RX ADMIN — MILRINONE LACTATE 2.52 MICROGRAM(S)/KG/MIN: 1 INJECTION, SOLUTION INTRAVENOUS at 23:56

## 2023-01-01 RX ADMIN — POLYETHYLENE GLYCOL 3350 17 GRAM(S): 17 POWDER, FOR SOLUTION ORAL at 17:51

## 2023-01-01 RX ADMIN — POLYETHYLENE GLYCOL 3350 17 GRAM(S): 17 POWDER, FOR SOLUTION ORAL at 17:40

## 2023-01-01 RX ADMIN — Medication 25 MICROGRAM(S): at 06:02

## 2023-01-01 RX ADMIN — Medication 50 MICROGRAM(S): at 05:21

## 2023-01-01 RX ADMIN — Medication 325 MILLIGRAM(S): at 13:41

## 2023-01-01 RX ADMIN — PANTOPRAZOLE SODIUM 40 MILLIGRAM(S): 20 TABLET, DELAYED RELEASE ORAL at 05:04

## 2023-01-01 RX ADMIN — GABAPENTIN 100 MILLIGRAM(S): 400 CAPSULE ORAL at 18:20

## 2023-01-01 RX ADMIN — DULOXETINE HYDROCHLORIDE 30 MILLIGRAM(S): 30 CAPSULE, DELAYED RELEASE ORAL at 13:07

## 2023-01-01 RX ADMIN — Medication 1 APPLICATION(S): at 05:55

## 2023-01-01 RX ADMIN — Medication 6 UNIT(S): at 16:44

## 2023-01-01 RX ADMIN — Medication 250 MILLIGRAM(S): at 17:30

## 2023-01-01 RX ADMIN — LATANOPROST 1 DROP(S): 0.05 SOLUTION/ DROPS OPHTHALMIC; TOPICAL at 21:14

## 2023-01-01 RX ADMIN — LATANOPROST 1 DROP(S): 0.05 SOLUTION/ DROPS OPHTHALMIC; TOPICAL at 21:40

## 2023-01-01 RX ADMIN — Medication 2: at 08:04

## 2023-01-01 RX ADMIN — MIDODRINE HYDROCHLORIDE 10 MILLIGRAM(S): 2.5 TABLET ORAL at 06:14

## 2023-01-01 RX ADMIN — CHLORHEXIDINE GLUCONATE 1 APPLICATION(S): 213 SOLUTION TOPICAL at 12:14

## 2023-01-01 RX ADMIN — CHLORHEXIDINE GLUCONATE 1 APPLICATION(S): 213 SOLUTION TOPICAL at 12:08

## 2023-01-01 RX ADMIN — PANTOPRAZOLE SODIUM 40 MILLIGRAM(S): 20 TABLET, DELAYED RELEASE ORAL at 06:12

## 2023-01-01 RX ADMIN — LATANOPROST 1 DROP(S): 0.05 SOLUTION/ DROPS OPHTHALMIC; TOPICAL at 22:45

## 2023-01-01 RX ADMIN — Medication 2: at 17:50

## 2023-01-01 RX ADMIN — Medication 81 MILLIGRAM(S): at 12:29

## 2023-01-01 RX ADMIN — Medication 250 MICROGRAM(S): at 06:17

## 2023-01-01 RX ADMIN — LACTULOSE 10 GRAM(S): 10 SOLUTION ORAL at 21:27

## 2023-01-01 RX ADMIN — GABAPENTIN 100 MILLIGRAM(S): 400 CAPSULE ORAL at 16:44

## 2023-01-01 RX ADMIN — DULOXETINE HYDROCHLORIDE 30 MILLIGRAM(S): 30 CAPSULE, DELAYED RELEASE ORAL at 13:41

## 2023-01-01 RX ADMIN — DULOXETINE HYDROCHLORIDE 30 MILLIGRAM(S): 30 CAPSULE, DELAYED RELEASE ORAL at 05:21

## 2023-01-01 RX ADMIN — Medication 81 MILLIGRAM(S): at 12:08

## 2023-01-01 RX ADMIN — INSULIN GLARGINE 60 UNIT(S): 100 INJECTION, SOLUTION SUBCUTANEOUS at 09:18

## 2023-01-01 RX ADMIN — Medication 40 MILLIGRAM(S): at 06:26

## 2023-01-01 RX ADMIN — GABAPENTIN 100 MILLIGRAM(S): 400 CAPSULE ORAL at 05:17

## 2023-01-01 RX ADMIN — Medication 50 MICROGRAM(S): at 05:09

## 2023-01-01 RX ADMIN — MUPIROCIN 1 APPLICATION(S): 20 OINTMENT TOPICAL at 17:36

## 2023-01-01 RX ADMIN — LACTULOSE 10 GRAM(S): 10 SOLUTION ORAL at 22:10

## 2023-01-01 RX ADMIN — Medication 1 APPLICATION(S): at 18:13

## 2023-01-01 RX ADMIN — Medication 1 APPLICATION(S): at 05:30

## 2023-01-01 RX ADMIN — MUPIROCIN 1 APPLICATION(S): 20 OINTMENT TOPICAL at 17:17

## 2023-01-01 RX ADMIN — MEROPENEM 100 MILLIGRAM(S): 1 INJECTION INTRAVENOUS at 17:04

## 2023-01-01 RX ADMIN — HEPARIN SODIUM 5000 UNIT(S): 5000 INJECTION INTRAVENOUS; SUBCUTANEOUS at 17:10

## 2023-01-01 RX ADMIN — Medication 1 MILLIGRAM(S): at 09:09

## 2023-01-01 RX ADMIN — HEPARIN SODIUM 5000 UNIT(S): 5000 INJECTION INTRAVENOUS; SUBCUTANEOUS at 18:07

## 2023-01-01 RX ADMIN — INSULIN GLARGINE 12 UNIT(S): 100 INJECTION, SOLUTION SUBCUTANEOUS at 22:04

## 2023-01-01 RX ADMIN — MILRINONE LACTATE 2.44 MICROGRAM(S)/KG/MIN: 1 INJECTION, SOLUTION INTRAVENOUS at 10:39

## 2023-01-01 RX ADMIN — Medication 1 APPLICATION(S): at 17:42

## 2023-01-01 RX ADMIN — Medication 1: at 08:05

## 2023-01-01 RX ADMIN — Medication 2: at 08:18

## 2023-01-01 RX ADMIN — Medication 1 APPLICATION(S): at 06:40

## 2023-01-01 RX ADMIN — Medication 150 MILLIEQUIVALENT(S): at 06:28

## 2023-01-01 RX ADMIN — Medication 1 TABLET(S): at 12:04

## 2023-01-01 RX ADMIN — Medication 81 MILLIGRAM(S): at 11:07

## 2023-01-01 RX ADMIN — Medication 6 UNIT(S): at 08:04

## 2023-01-01 RX ADMIN — Medication 1 MILLIGRAM(S): at 10:39

## 2023-01-01 RX ADMIN — OXYCODONE HYDROCHLORIDE 10 MILLIGRAM(S): 5 TABLET ORAL at 11:00

## 2023-01-01 RX ADMIN — Medication 1 TABLET(S): at 11:10

## 2023-01-01 RX ADMIN — Medication 1 APPLICATION(S): at 06:03

## 2023-01-01 RX ADMIN — HEPARIN SODIUM 5000 UNIT(S): 5000 INJECTION INTRAVENOUS; SUBCUTANEOUS at 06:24

## 2023-01-01 RX ADMIN — Medication 1 APPLICATION(S): at 06:15

## 2023-01-01 RX ADMIN — INSULIN HUMAN 10 UNIT(S): 100 INJECTION, SOLUTION SUBCUTANEOUS at 09:18

## 2023-01-01 RX ADMIN — Medication 1 MILLIGRAM(S): at 22:49

## 2023-01-01 RX ADMIN — Medication 2: at 11:55

## 2023-01-01 RX ADMIN — Medication 20 MILLIGRAM(S): at 05:26

## 2023-01-01 RX ADMIN — INSULIN GLARGINE 12 UNIT(S): 100 INJECTION, SOLUTION SUBCUTANEOUS at 21:45

## 2023-01-01 RX ADMIN — HEPARIN SODIUM 5000 UNIT(S): 5000 INJECTION INTRAVENOUS; SUBCUTANEOUS at 05:51

## 2023-01-01 RX ADMIN — GABAPENTIN 100 MILLIGRAM(S): 400 CAPSULE ORAL at 17:24

## 2023-01-01 RX ADMIN — Medication 25 MILLIGRAM(S): at 06:05

## 2023-01-01 RX ADMIN — Medication 1 TABLET(S): at 12:08

## 2023-01-01 RX ADMIN — Medication 1 APPLICATION(S): at 05:56

## 2023-01-01 RX ADMIN — Medication 81 MILLIGRAM(S): at 13:18

## 2023-01-01 RX ADMIN — DULOXETINE HYDROCHLORIDE 30 MILLIGRAM(S): 30 CAPSULE, DELAYED RELEASE ORAL at 11:12

## 2023-01-01 RX ADMIN — PANTOPRAZOLE SODIUM 40 MILLIGRAM(S): 20 TABLET, DELAYED RELEASE ORAL at 06:04

## 2023-01-01 RX ADMIN — Medication 4: at 11:44

## 2023-01-01 RX ADMIN — AMIODARONE HYDROCHLORIDE 600 MILLIGRAM(S): 400 TABLET ORAL at 18:45

## 2023-01-01 RX ADMIN — Medication 6: at 17:02

## 2023-01-01 RX ADMIN — Medication 20 MILLIGRAM(S): at 06:33

## 2023-01-01 RX ADMIN — OXYCODONE HYDROCHLORIDE 10 MILLIGRAM(S): 5 TABLET ORAL at 19:53

## 2023-01-01 RX ADMIN — Medication 1 MILLIGRAM(S): at 08:17

## 2023-01-01 RX ADMIN — MIDODRINE HYDROCHLORIDE 10 MILLIGRAM(S): 2.5 TABLET ORAL at 06:26

## 2023-01-01 RX ADMIN — Medication 1 TABLET(S): at 14:32

## 2023-01-01 RX ADMIN — HEPARIN SODIUM 5000 UNIT(S): 5000 INJECTION INTRAVENOUS; SUBCUTANEOUS at 06:42

## 2023-01-01 RX ADMIN — Medication 2: at 08:13

## 2023-01-01 RX ADMIN — DEXMEDETOMIDINE HYDROCHLORIDE IN 0.9% SODIUM CHLORIDE 3.25 MICROGRAM(S)/KG/HR: 4 INJECTION INTRAVENOUS at 18:00

## 2023-01-01 RX ADMIN — Medication 8 UNIT(S): at 17:26

## 2023-01-01 RX ADMIN — Medication 81 MILLIGRAM(S): at 11:59

## 2023-01-01 RX ADMIN — Medication 25 MICROGRAM(S): at 05:54

## 2023-01-01 RX ADMIN — VASOPRESSIN 6 UNIT(S)/MIN: 20 INJECTION INTRAVENOUS at 21:42

## 2023-01-01 RX ADMIN — CHLORHEXIDINE GLUCONATE 1 APPLICATION(S): 213 SOLUTION TOPICAL at 12:32

## 2023-01-01 RX ADMIN — Medication 4: at 07:47

## 2023-01-01 RX ADMIN — Medication 2: at 16:44

## 2023-01-01 RX ADMIN — CHLORHEXIDINE GLUCONATE 1 APPLICATION(S): 213 SOLUTION TOPICAL at 06:40

## 2023-01-01 RX ADMIN — DULOXETINE HYDROCHLORIDE 30 MILLIGRAM(S): 30 CAPSULE, DELAYED RELEASE ORAL at 22:51

## 2023-01-01 RX ADMIN — Medication 1: at 08:27

## 2023-01-01 RX ADMIN — MEROPENEM 100 MILLIGRAM(S): 1 INJECTION INTRAVENOUS at 17:19

## 2023-01-01 RX ADMIN — HEPARIN SODIUM 5000 UNIT(S): 5000 INJECTION INTRAVENOUS; SUBCUTANEOUS at 17:25

## 2023-01-01 RX ADMIN — OXYCODONE HYDROCHLORIDE 10 MILLIGRAM(S): 5 TABLET ORAL at 06:57

## 2023-01-01 RX ADMIN — Medication 81 MILLIGRAM(S): at 11:22

## 2023-01-01 RX ADMIN — Medication 81 MILLIGRAM(S): at 11:12

## 2023-01-01 RX ADMIN — Medication 1 APPLICATION(S): at 06:09

## 2023-01-01 RX ADMIN — MIDODRINE HYDROCHLORIDE 10 MILLIGRAM(S): 2.5 TABLET ORAL at 09:14

## 2023-01-01 RX ADMIN — Medication 40 MILLIGRAM(S): at 13:07

## 2023-01-01 RX ADMIN — Medication 1 MILLIGRAM(S): at 08:08

## 2023-01-01 RX ADMIN — Medication 40 MILLIGRAM(S): at 17:36

## 2023-01-01 RX ADMIN — MIDODRINE HYDROCHLORIDE 10 MILLIGRAM(S): 2.5 TABLET ORAL at 11:37

## 2023-01-01 RX ADMIN — CEFEPIME 100 MILLIGRAM(S): 1 INJECTION, POWDER, FOR SOLUTION INTRAMUSCULAR; INTRAVENOUS at 05:27

## 2023-01-01 RX ADMIN — LATANOPROST 1 DROP(S): 0.05 SOLUTION/ DROPS OPHTHALMIC; TOPICAL at 21:00

## 2023-01-01 RX ADMIN — OXYCODONE HYDROCHLORIDE 10 MILLIGRAM(S): 5 TABLET ORAL at 13:11

## 2023-01-01 RX ADMIN — HALOPERIDOL DECANOATE 2 MILLIGRAM(S): 100 INJECTION INTRAMUSCULAR at 16:56

## 2023-01-01 RX ADMIN — Medication 25 MICROGRAM(S): at 06:09

## 2023-01-01 RX ADMIN — HEPARIN SODIUM 5000 UNIT(S): 5000 INJECTION INTRAVENOUS; SUBCUTANEOUS at 05:52

## 2023-01-01 RX ADMIN — OXYCODONE HYDROCHLORIDE 5 MILLIGRAM(S): 5 TABLET ORAL at 02:42

## 2023-01-01 RX ADMIN — PRASUGREL 10 MILLIGRAM(S): 5 TABLET, FILM COATED ORAL at 17:17

## 2023-01-01 RX ADMIN — Medication 325 MILLIGRAM(S): at 21:52

## 2023-01-01 RX ADMIN — Medication 1 APPLICATION(S): at 17:31

## 2023-01-01 RX ADMIN — POLYETHYLENE GLYCOL 3350 17 GRAM(S): 17 POWDER, FOR SOLUTION ORAL at 22:49

## 2023-01-01 RX ADMIN — PRASUGREL 10 MILLIGRAM(S): 5 TABLET, FILM COATED ORAL at 13:30

## 2023-01-01 RX ADMIN — Medication 1 APPLICATION(S): at 17:07

## 2023-01-01 RX ADMIN — SENNA PLUS 2 TABLET(S): 8.6 TABLET ORAL at 21:26

## 2023-01-01 RX ADMIN — DULOXETINE HYDROCHLORIDE 30 MILLIGRAM(S): 30 CAPSULE, DELAYED RELEASE ORAL at 12:30

## 2023-01-01 RX ADMIN — CHLORHEXIDINE GLUCONATE 15 MILLILITER(S): 213 SOLUTION TOPICAL at 17:54

## 2023-01-01 RX ADMIN — SENNA PLUS 2 TABLET(S): 8.6 TABLET ORAL at 21:02

## 2023-01-01 RX ADMIN — PRASUGREL 10 MILLIGRAM(S): 5 TABLET, FILM COATED ORAL at 17:02

## 2023-01-01 RX ADMIN — PRASUGREL 10 MILLIGRAM(S): 5 TABLET, FILM COATED ORAL at 11:27

## 2023-01-01 RX ADMIN — Medication 1 APPLICATION(S): at 18:45

## 2023-01-01 RX ADMIN — LATANOPROST 1 DROP(S): 0.05 SOLUTION/ DROPS OPHTHALMIC; TOPICAL at 22:03

## 2023-01-01 RX ADMIN — DULOXETINE HYDROCHLORIDE 30 MILLIGRAM(S): 30 CAPSULE, DELAYED RELEASE ORAL at 21:41

## 2023-01-01 RX ADMIN — Medication 81 MILLIGRAM(S): at 11:26

## 2023-01-01 RX ADMIN — MUPIROCIN 1 APPLICATION(S): 20 OINTMENT TOPICAL at 17:25

## 2023-01-01 RX ADMIN — MUPIROCIN 1 APPLICATION(S): 20 OINTMENT TOPICAL at 05:22

## 2023-01-01 RX ADMIN — Medication 1 TABLET(S): at 11:28

## 2023-01-01 RX ADMIN — CHLORHEXIDINE GLUCONATE 1 APPLICATION(S): 213 SOLUTION TOPICAL at 15:53

## 2023-01-01 RX ADMIN — Medication 25 MILLIGRAM(S): at 05:28

## 2023-01-01 RX ADMIN — Medication 1 APPLICATION(S): at 18:03

## 2023-01-01 RX ADMIN — Medication 25 GRAM(S): at 11:17

## 2023-01-01 RX ADMIN — HEPARIN SODIUM 5000 UNIT(S): 5000 INJECTION INTRAVENOUS; SUBCUTANEOUS at 06:59

## 2023-01-01 RX ADMIN — Medication 1: at 12:36

## 2023-01-01 RX ADMIN — CHLORHEXIDINE GLUCONATE 1 APPLICATION(S): 213 SOLUTION TOPICAL at 13:08

## 2023-01-01 RX ADMIN — QUETIAPINE FUMARATE 50 MILLIGRAM(S): 200 TABLET, FILM COATED ORAL at 22:04

## 2023-01-01 RX ADMIN — PRASUGREL 10 MILLIGRAM(S): 5 TABLET, FILM COATED ORAL at 17:09

## 2023-01-01 RX ADMIN — DULOXETINE HYDROCHLORIDE 30 MILLIGRAM(S): 30 CAPSULE, DELAYED RELEASE ORAL at 11:32

## 2023-01-01 RX ADMIN — Medication 1 MILLIGRAM(S): at 02:08

## 2023-01-01 RX ADMIN — DULOXETINE HYDROCHLORIDE 30 MILLIGRAM(S): 30 CAPSULE, DELAYED RELEASE ORAL at 05:47

## 2023-01-01 RX ADMIN — Medication 2: at 08:16

## 2023-01-01 RX ADMIN — DULOXETINE HYDROCHLORIDE 30 MILLIGRAM(S): 30 CAPSULE, DELAYED RELEASE ORAL at 23:05

## 2023-01-01 RX ADMIN — Medication 1 APPLICATION(S): at 05:09

## 2023-01-01 RX ADMIN — Medication 1 APPLICATION(S): at 17:38

## 2023-01-01 RX ADMIN — Medication 40 MILLIGRAM(S): at 05:02

## 2023-01-01 RX ADMIN — Medication 125 MICROGRAM(S): at 11:08

## 2023-01-01 RX ADMIN — DULOXETINE HYDROCHLORIDE 30 MILLIGRAM(S): 30 CAPSULE, DELAYED RELEASE ORAL at 21:25

## 2023-01-01 RX ADMIN — Medication 166.67 MILLIGRAM(S): at 06:35

## 2023-01-01 RX ADMIN — DULOXETINE HYDROCHLORIDE 30 MILLIGRAM(S): 30 CAPSULE, DELAYED RELEASE ORAL at 20:36

## 2023-01-01 RX ADMIN — Medication 4: at 12:25

## 2023-01-01 RX ADMIN — SODIUM CHLORIDE 50 MILLILITER(S): 5 INJECTION, SOLUTION INTRAVENOUS at 18:55

## 2023-01-01 RX ADMIN — BUMETANIDE 2 MILLIGRAM(S): 0.25 INJECTION INTRAMUSCULAR; INTRAVENOUS at 06:09

## 2023-01-01 RX ADMIN — Medication 25 MILLIGRAM(S): at 05:02

## 2023-01-01 RX ADMIN — PANTOPRAZOLE SODIUM 40 MILLIGRAM(S): 20 TABLET, DELAYED RELEASE ORAL at 06:18

## 2023-01-01 RX ADMIN — Medication 25 GRAM(S): at 12:36

## 2023-01-01 RX ADMIN — Medication 250 MICROGRAM(S): at 05:28

## 2023-01-01 RX ADMIN — DULOXETINE HYDROCHLORIDE 30 MILLIGRAM(S): 30 CAPSULE, DELAYED RELEASE ORAL at 06:43

## 2023-01-01 RX ADMIN — Medication 1 APPLICATION(S): at 06:13

## 2023-01-01 RX ADMIN — Medication 1 MILLIGRAM(S): at 08:06

## 2023-01-01 RX ADMIN — POLYETHYLENE GLYCOL 3350 17 GRAM(S): 17 POWDER, FOR SOLUTION ORAL at 06:42

## 2023-01-01 RX ADMIN — LATANOPROST 1 DROP(S): 0.05 SOLUTION/ DROPS OPHTHALMIC; TOPICAL at 21:58

## 2023-01-01 RX ADMIN — Medication 81 MILLIGRAM(S): at 12:04

## 2023-01-01 RX ADMIN — Medication 60 MG/MIN: at 23:56

## 2023-01-01 RX ADMIN — Medication 1: at 17:45

## 2023-01-01 RX ADMIN — Medication 60 MILLIGRAM(S): at 07:26

## 2023-01-01 RX ADMIN — Medication 81 MILLIGRAM(S): at 11:32

## 2023-01-01 RX ADMIN — PRASUGREL 10 MILLIGRAM(S): 5 TABLET, FILM COATED ORAL at 11:59

## 2023-01-01 RX ADMIN — Medication 50 MILLIEQUIVALENT(S): at 01:16

## 2023-01-01 RX ADMIN — INSULIN GLARGINE 10 UNIT(S): 100 INJECTION, SOLUTION SUBCUTANEOUS at 21:53

## 2023-01-01 RX ADMIN — INSULIN GLARGINE 12 UNIT(S): 100 INJECTION, SOLUTION SUBCUTANEOUS at 22:09

## 2023-01-01 RX ADMIN — DULOXETINE HYDROCHLORIDE 30 MILLIGRAM(S): 30 CAPSULE, DELAYED RELEASE ORAL at 05:36

## 2023-01-01 RX ADMIN — GABAPENTIN 100 MILLIGRAM(S): 400 CAPSULE ORAL at 14:09

## 2023-01-01 RX ADMIN — Medication 1: at 12:09

## 2023-01-01 RX ADMIN — POLYETHYLENE GLYCOL 3350 17 GRAM(S): 17 POWDER, FOR SOLUTION ORAL at 06:01

## 2023-01-01 RX ADMIN — Medication 20 MILLIGRAM(S): at 05:32

## 2023-01-01 RX ADMIN — INSULIN GLARGINE 32 UNIT(S): 100 INJECTION, SOLUTION SUBCUTANEOUS at 08:46

## 2023-01-01 RX ADMIN — Medication 20 MILLIGRAM(S): at 21:49

## 2023-01-01 RX ADMIN — MEROPENEM 100 MILLIGRAM(S): 1 INJECTION INTRAVENOUS at 05:24

## 2023-01-01 RX ADMIN — QUETIAPINE FUMARATE 50 MILLIGRAM(S): 200 TABLET, FILM COATED ORAL at 23:14

## 2023-01-01 RX ADMIN — POLYETHYLENE GLYCOL 3350 17 GRAM(S): 17 POWDER, FOR SOLUTION ORAL at 05:17

## 2023-01-01 RX ADMIN — Medication 1 APPLICATION(S): at 06:20

## 2023-01-01 RX ADMIN — CHLORHEXIDINE GLUCONATE 15 MILLILITER(S): 213 SOLUTION TOPICAL at 06:06

## 2023-01-01 RX ADMIN — PRASUGREL 10 MILLIGRAM(S): 5 TABLET, FILM COATED ORAL at 12:22

## 2023-01-01 RX ADMIN — Medication 1 APPLICATION(S): at 06:22

## 2023-01-01 RX ADMIN — Medication 20 MILLIGRAM(S): at 06:42

## 2023-01-01 RX ADMIN — MIDODRINE HYDROCHLORIDE 5 MILLIGRAM(S): 2.5 TABLET ORAL at 19:41

## 2023-01-01 RX ADMIN — INSULIN GLARGINE 12 UNIT(S): 100 INJECTION, SOLUTION SUBCUTANEOUS at 07:58

## 2023-01-01 RX ADMIN — Medication 25 MILLIGRAM(S): at 05:35

## 2023-01-01 RX ADMIN — Medication 50 MICROGRAM(S): at 05:52

## 2023-01-01 RX ADMIN — CHLORHEXIDINE GLUCONATE 1 APPLICATION(S): 213 SOLUTION TOPICAL at 11:57

## 2023-01-01 RX ADMIN — SODIUM CHLORIDE 500 MILLILITER(S): 9 INJECTION, SOLUTION INTRAVENOUS at 05:32

## 2023-01-01 RX ADMIN — Medication 25 MICROGRAM(S): at 05:22

## 2023-01-01 RX ADMIN — ATORVASTATIN CALCIUM 80 MILLIGRAM(S): 80 TABLET, FILM COATED ORAL at 22:04

## 2023-01-01 RX ADMIN — CHLORHEXIDINE GLUCONATE 1 APPLICATION(S): 213 SOLUTION TOPICAL at 11:27

## 2023-01-01 RX ADMIN — SODIUM CHLORIDE 50 MILLILITER(S): 5 INJECTION, SOLUTION INTRAVENOUS at 18:15

## 2023-01-01 RX ADMIN — Medication 1: at 17:27

## 2023-01-01 RX ADMIN — Medication 25 MILLIGRAM(S): at 06:45

## 2023-01-01 RX ADMIN — Medication 25 MILLIGRAM(S): at 06:17

## 2023-01-01 RX ADMIN — HEPARIN SODIUM 5000 UNIT(S): 5000 INJECTION INTRAVENOUS; SUBCUTANEOUS at 05:32

## 2023-01-01 RX ADMIN — MEROPENEM 100 MILLIGRAM(S): 1 INJECTION INTRAVENOUS at 06:12

## 2023-01-01 RX ADMIN — Medication 4: at 16:45

## 2023-01-01 RX ADMIN — SENNA PLUS 2 TABLET(S): 8.6 TABLET ORAL at 23:05

## 2023-01-01 RX ADMIN — IRON SUCROSE 110 MILLIGRAM(S): 20 INJECTION, SOLUTION INTRAVENOUS at 13:05

## 2023-01-01 RX ADMIN — DULOXETINE HYDROCHLORIDE 30 MILLIGRAM(S): 30 CAPSULE, DELAYED RELEASE ORAL at 13:37

## 2023-01-01 RX ADMIN — DULOXETINE HYDROCHLORIDE 30 MILLIGRAM(S): 30 CAPSULE, DELAYED RELEASE ORAL at 22:10

## 2023-01-01 RX ADMIN — SENNA PLUS 2 TABLET(S): 8.6 TABLET ORAL at 22:01

## 2023-01-01 RX ADMIN — LACTULOSE 10 GRAM(S): 10 SOLUTION ORAL at 22:04

## 2023-01-01 RX ADMIN — Medication 25 MICROGRAM(S): at 05:57

## 2023-01-01 RX ADMIN — PRASUGREL 10 MILLIGRAM(S): 5 TABLET, FILM COATED ORAL at 18:21

## 2023-01-01 RX ADMIN — Medication 2: at 12:33

## 2023-01-01 RX ADMIN — DULOXETINE HYDROCHLORIDE 30 MILLIGRAM(S): 30 CAPSULE, DELAYED RELEASE ORAL at 06:02

## 2023-01-01 RX ADMIN — Medication 25 MILLIGRAM(S): at 05:51

## 2023-01-01 RX ADMIN — OXYCODONE HYDROCHLORIDE 10 MILLIGRAM(S): 5 TABLET ORAL at 19:20

## 2023-01-01 RX ADMIN — Medication 1 APPLICATION(S): at 05:13

## 2023-01-01 RX ADMIN — PANTOPRAZOLE SODIUM 40 MILLIGRAM(S): 20 TABLET, DELAYED RELEASE ORAL at 05:27

## 2023-01-01 RX ADMIN — SENNA PLUS 2 TABLET(S): 8.6 TABLET ORAL at 06:00

## 2023-01-01 RX ADMIN — CEFEPIME 100 MILLIGRAM(S): 1 INJECTION, POWDER, FOR SOLUTION INTRAMUSCULAR; INTRAVENOUS at 18:42

## 2023-01-01 RX ADMIN — Medication 100 MILLIGRAM(S): at 05:34

## 2023-01-01 RX ADMIN — INSULIN GLARGINE 8 UNIT(S): 100 INJECTION, SOLUTION SUBCUTANEOUS at 08:14

## 2023-01-01 RX ADMIN — DULOXETINE HYDROCHLORIDE 30 MILLIGRAM(S): 30 CAPSULE, DELAYED RELEASE ORAL at 23:32

## 2023-01-01 RX ADMIN — Medication 25 GRAM(S): at 14:39

## 2023-01-01 RX ADMIN — LACTULOSE 10 GRAM(S): 10 SOLUTION ORAL at 22:00

## 2023-01-01 RX ADMIN — PRASUGREL 10 MILLIGRAM(S): 5 TABLET, FILM COATED ORAL at 12:58

## 2023-01-01 RX ADMIN — Medication 1 TABLET(S): at 11:54

## 2023-01-01 RX ADMIN — Medication 50 MICROGRAM(S): at 05:30

## 2023-01-01 RX ADMIN — HEPARIN SODIUM 5000 UNIT(S): 5000 INJECTION INTRAVENOUS; SUBCUTANEOUS at 17:02

## 2023-01-01 RX ADMIN — ATORVASTATIN CALCIUM 80 MILLIGRAM(S): 80 TABLET, FILM COATED ORAL at 22:56

## 2023-01-01 RX ADMIN — Medication 25 MICROGRAM(S): at 05:30

## 2023-01-01 RX ADMIN — MUPIROCIN 1 APPLICATION(S): 20 OINTMENT TOPICAL at 05:04

## 2023-01-01 RX ADMIN — PANTOPRAZOLE SODIUM 40 MILLIGRAM(S): 20 TABLET, DELAYED RELEASE ORAL at 06:44

## 2023-01-01 ASSESSMENT — KOOS JR
HOW SEVERE IS YOUR KNEE STIFFNESS AFTER FIRST WAKING IN MORNING: MODERATE
GOING UP OR DOWN STAIRS: MODERATE
STRAIGHTENING KNEE FULLY: MODERATE
IMPORTED LATERALITY: RIGHT
BENDING TO THE FLOOR TO PICK UP OBJECT: MILD
RISING FROM SITTING: MODERATE
TWISING OR PIVOTING ON KNEE: MODERATE
STRAIGHTENING KNEE FULLY: MODERATE
IMPORTED FORM: YES
IMPORTED KOOS JR SCORE: 57.14
GOING UP OR DOWN STAIRS: MODERATE
RISING FROM SITTING: MILD
STANDING UPRIGHT: MODERATE
IMPORTED KOOS JR SCORE: 57.14
IMPORTED LATERALITY: RIGHT
RISING FROM SITTING: MODERATE
STANDING UPRIGHT: MODERATE
HOW SEVERE IS YOUR KNEE STIFFNESS AFTER FIRST WAKING IN MORNING: MODERATE
RISING FROM SITTING: MILD
RISING FROM SITTING: MODERATE
KOOS JR RAW SCORE: 12
BENDING TO THE FLOOR TO PICK UP OBJECT: MODERATE
KOOS JR RAW SCORE: 12
IMPORTED FORM: YES
TWISING OR PIVOTING ON KNEE: MODERATE
STANDING UPRIGHT: MODERATE
GOING UP OR DOWN STAIRS: SEVERE
GOING UP OR DOWN STAIRS: SEVERE
BENDING TO THE FLOOR TO PICK UP OBJECT: MODERATE
STRAIGHTENING KNEE FULLY: MODERATE
KOOS JR RAW SCORE: 12
IMPORTED LATERALITY: RIGHT
IMPORTED KOOS JR SCORE: 57.14
TWISING OR PIVOTING ON KNEE: MODERATE
IMPORTED FORM: YES
GOING UP OR DOWN STAIRS: MODERATE
HOW SEVERE IS YOUR KNEE STIFFNESS AFTER FIRST WAKING IN MORNING: MODERATE
GOING UP OR DOWN STAIRS: SEVERE
RISING FROM SITTING: MILD
BENDING TO THE FLOOR TO PICK UP OBJECT: MILD
TWISING OR PIVOTING ON KNEE: MILD
TWISING OR PIVOTING ON KNEE: MILD
BENDING TO THE FLOOR TO PICK UP OBJECT: MILD
BENDING TO THE FLOOR TO PICK UP OBJECT: MODERATE
TWISING OR PIVOTING ON KNEE: MILD

## 2023-01-03 PROBLEM — G47.33 OBSTRUCTIVE SLEEP APNEA, ADULT: Status: ACTIVE | Noted: 2023-01-01

## 2023-01-03 NOTE — ASSESSMENT
[FreeTextEntry1] : Assessment:\par TIAGO\par Obesity\par \par PLAN:\par The patient needs the PAP device .\par New supplies ordered \par Weight loss discussed\par I stressed the need maintain compliance  with the PAP device.\par The patient is not to use an Ozone or UV sterilizer. \par F/U in 12 months\par \par I reviewed the entire case with the patient's wife who was in on phone today.\par \par total phone call  time 14min\par \par \par \par

## 2023-01-03 NOTE — HISTORY OF PRESENT ILLNESS
[Home] : at home, [unfilled] , at the time of the visit. [Medical Office: ([facility])___] : at the medical office located in  [Verbal consent obtained from patient] : the patient, [unfilled] [TextBox_4] : I reviewed all the previous sleep test that are available on file.\par I reviewed the previous office notes.\par

## 2023-01-10 NOTE — ED PROVIDER NOTE - CLINICAL SUMMARY MEDICAL DECISION MAKING FREE TEXT BOX
will admit for cellulitis in higher risk patient  trop mildly elevated above baseline, previous bnps to similar range  abx given

## 2023-01-10 NOTE — ED PROVIDER NOTE - DIFFERENTIAL DIAGNOSIS
Differential Diagnosis cellulitis vs unstagable DFU to LLE, r/o dvt, r/o CHF exacerbation give story of increasing LE edema  UE- possible psoriasis, now with weeping  lesions to forearm and abrasions to knuckes

## 2023-01-10 NOTE — ED PROVIDER NOTE - OBJECTIVE STATEMENT
68 yo m PMHx of CAD s/p MIDCAB/VERONICA 2018, AICD (2/2020), CHF (EF 15-20%), DM, HLD, HTN, R AKA  pt presents for eval of LLE redness over the past 2 weeks. pt states he belives it began from either blister which popped to foot or abrasion from furniture. pt has not been on abx.  no fevers/chills. redness has spread up from foot to mid calf. pt able to ROM ankle/toes.  of note, wife states pt has been having some increasing peripheral edema and was advised to increase his lasix. pt also states he was recently dx w/ psoriasis to upper extremities by wrist and had been on topical steroid

## 2023-01-10 NOTE — ED PROVIDER NOTE - PHYSICAL EXAMINATION
vss  gen- NAD, aaox3  card-rrr  lungs-ctab, no wheezing or rhonchi  abd-sntnd, no guarding or rebound  neuro- full str/sensation, cn ii-xii grossly intact, normal coordination  LLE- 1+ edema from calf to foot, erythema from mid calf to foot, dopplerable PT, deroofed blister to base of foot and several ulcers w/ scabs to foot  RLE- s/p AKA, no erythema/tenderness  LUE- excoriation and dry, rash to L dorsal distal forearm w/ abrasions to knuckles, no discharge  RUE- excoriation and dry, rash to R dorsal distal forearm w/ abrasions to knuckles, no discharge

## 2023-01-10 NOTE — ED PROVIDER NOTE - CONSIDERATION OF ADMISSION OBSERVATION
given extensive co-morbid conditions, expectation of poor wound healing, longer course of abx, pt will need admission Consideration of Admission/Observation

## 2023-01-11 NOTE — H&P ADULT - NSHPPHYSICALEXAM_GEN_ALL_CORE
Vital Signs Last 24 Hrs  T(C): 36.9 (10 Kris 2023 14:31), Max: 36.9 (10 Kris 2023 14:31)  T(F): 98.4 (10 Kris 2023 14:31), Max: 98.4 (10 Kris 2023 14:31)  HR: 77 (10 Kris 2023 14:31) (77 - 77)  BP: 125/86 (10 Kris 2023 14:31) (125/86 - 125/86)  RR: 17 (10 Kris 2023 14:31) (17 - 17)  SpO2: 99% (10 Kris 2023 14:31) (99% - 99%)    Parameters below as of 10 Kris 2023 14:31  Patient On (Oxygen Delivery Method): room air    PHYSICAL EXAM  GENERAL: NAD,  HEAD:  NCAT, EOMI, MMM  NECK: Supple, Nontender  NERVOUS SYSTEM: AAOx3, NFD  CHEST/LUNG: + bs b/l  HEART: +s1s2 RRR  ABDOMEN: soft, NT/ND  EXTREMITIES:  pp,   SKIN: Vital Signs Last 24 Hrs  T(C): 36.9 (10 Kris 2023 14:31), Max: 36.9 (10 Kris 2023 14:31)  T(F): 98.4 (10 Kris 2023 14:31), Max: 98.4 (10 Kris 2023 14:31)  HR: 77 (10 Kris 2023 14:31) (77 - 77)  BP: 125/86 (10 Kris 2023 14:31) (125/86 - 125/86)  RR: 17 (10 Kris 2023 14:31) (17 - 17)  SpO2: 99% (10 Kris 2023 14:31) (99% - 99%)    Parameters below as of 10 Kris 2023 14:31  Patient On (Oxygen Delivery Method): room air    PHYSICAL EXAM  GENERAL: NAD  HEAD:  NCAT, EOMI, MMM  NECK: Supple, Nontender  NERVOUS SYSTEM: AAOx3, NFD  CHEST/LUNG: + bs b/l  HEART: +s1s2 RRR  ABDOMEN: soft, NT/ND  EXTREMITIES: b/l UE wrist wrapped, RLE AKA, LLE with redness, ulcers/abrasion/bullae

## 2023-01-11 NOTE — H&P ADULT - NSHPSOCIALHISTORY_GEN_ALL_CORE
Ex-smoker quit 30 years ago. No alcohol use. No illicit drug use. Wheel-chair bound at baseline, pt's wife takes care of him, transfer himself in and out of wheelchair. Pt does not have orthotic yet.

## 2023-01-11 NOTE — H&P ADULT - ASSESSMENT
66 yo M w HTN, HLD, CAD/MI s/p CABG/VERONICA, severe HF/ICM s/p AICD, Uncontrolled DM w peripheral neuropathy, R AKA, MRSA bacteremia, Psoriasis (on topical steroids), L ankle surgery with fixation hardware presents for recurrent cellulitis. Patient started on Abx and admitted for further evaluation.    #Recurrent Cellulitis in setting of mrsa bacteremia and numerous joint infections in past - not septic on admission  - started vanc and cefepime  - ID consult placed  - f/u pending labs and cultures    #HTN / HLD  #H/o CAD/MI s/p CABG/VERONICA  #severe HF/ICM s/p AICD  - cont home meds and cont outpatient follow up w Dr. Lopez    #Uncontrolled DM ( last A1c 12) w neuropathy  - f/u repeat A1c, monitor fs    #Hypothyroid  - cont home synthyroid    #Psoriasis (on topical steroids)    #Microcytic Anemia  - f/u iron panel and monitor cbc    #H/o Diverticulitis and Celiac Disease  - Nutrition f/u and vitamin replacement prn    #Arthritis  - pain control prn    #Depression/Anxiety  - cont home meds    DVT ppx:  GI ppx:  Activity:  Diet: 68 yo M w HTN, HLD, CAD/MI s/p CABG/VERONICA, severe HF/ICM s/p AICD, Uncontrolled DM w peripheral neuropathy, R AKA, MRSA bacteremia, Psoriasis (on topical steroids), L ankle surgery with fixation hardware presents for recurrent cellulitis. Patient started on Abx and admitted for further evaluation.    #Recurrent LLE Cellulitis in setting of mrsa bacteremia and numerous joint infections in past - not septic on admission  #h/o b/l wrist Psoriasis (on topical steroids per dermatologist) - both wrapped  - LLE erythematous with ulcers/abrasions/bullae  - started vanc and cefepime  - ID consult placed; may need burn/derm eval  - venous duplex neg; f/u arterial duplex; check esr/crp, f/u blood cultures and mrsa nares  - pain control prn    #HTN / HLD  #H/o CAD/MI s/p CABG/VERONICA  #severe HF/ICM s/p AICD  - cont home asa, statin, dig, effient, toprol; takes lasix prn for swelling  - cont outpatient follow up w Dr. Lopez    #Uncontrolled DM ( last A1c 12) formerly on insulin pump now on b/b  - apparently takes lantus 60u qAM and lispro 30 qAC  - f/u repeat A1c, monitor fs and adjust accordingly  - may need endocrine c/s    #Hypothyroid  - cont home synthyroid    #GERD  - cont home ppi    #Depression/Anxiety  - cont home duloxetine 30 tid    #Restless leg syndrome  - on valium 2mg bid prn at home    #Microcytic Anemia  - f/u iron panel and monitor cbc    #H/o Diverticulitis and Celiac Disease  - Nutrition f/u and vitamin replacement prn    DVT ppx: on effient  GI ppx: ppi  Activity: iat  Diet: aat 66 yo M w HTN, HLD, CAD/MI s/p CABG/VERONICA, severe HF/ICM s/p AICD, Uncontrolled DM w peripheral neuropathy, R AKA, MRSA bacteremia, Psoriasis (on topical steroids), L ankle surgery with fixation hardware presents for recurrent cellulitis. Patient started on Abx and admitted for further evaluation.    #Recurrent LLE Cellulitis in setting of mrsa bacteremia and numerous joint infections in past - not septic on admission  #h/o b/l wrist Psoriasis (on topical steroids per dermatologist) - both wrapped  - LLE erythematous with ulcers/abrasions/bullae  - started vanc and cefepime  - ID consult placed; may need burn/derm eval  - venous duplex neg; f/u arterial duplex; check esr/crp, f/u blood cultures and mrsa nares  - pain control prn    #HTN / HLD  #H/o CAD/MI s/p CABG/VERONICA  #severe HF/ICM s/p AICD  - cont home asa, statin, dig, effient, toprol; takes lasix prn for swelling  - cont outpatient follow up w Dr. Lopez    #Uncontrolled DM ( last A1c 12) formerly on insulin pump now on b/b  - apparently takes lantus 60u qAM and lispro 30 qAC  - f/u repeat A1c, monitor fs and adjust accordingly  - may need endocrine c/s    #Hypothyroid  - cont home synthyroid    #GERD  - cont home ppi    #Depression/Anxiety  - cont home duloxetine 30 tid    #Restless leg syndrome  - on valium 2mg bid prn at home    #Microcytic Anemia  - f/u iron panel and monitor cbc    #H/o Diverticulitis and Celiac Disease  - Nutrition f/u and vitamin replacement prn    DVT ppx: on effient  GI ppx: ppi  Activity: iat  Diet: aat    MED REC CONFIRMED WITH WIFE 68 yo M w HTN, HLD, CAD/MI s/p CABG/VERONICA, severe HF/ICM s/p AICD, Uncontrolled DM w peripheral neuropathy, R AKA, MRSA bacteremia, Psoriasis (on topical steroids), L ankle surgery with fixation hardware presents for recurrent cellulitis. Patient started on Abx and admitted for further evaluation.    #Recurrent LLE Cellulitis in setting of mrsa bacteremia and numerous joint infections in past - not septic on admission  #h/o b/l wrist Psoriasis (on topical steroids per dermatologist) - both wrapped  - LLE erythematous with ulcers/abrasions/bullae  - started vanc and cefepime  - ID consult placed; may need burn/derm eval  - venous duplex neg; f/u arterial duplex; check esr/crp, f/u blood cultures and mrsa nares  - last JOHN PAUL 01/2022 without vegetations  - pain control prn    #HTN / HLD  #H/o CAD/MI s/p CABG/VERONICA  #severe HF/ICM s/p AICD  - cont home asa, statin, dig, effient, toprol; takes lasix prn for swelling  - cont outpatient follow up w Dr. Lopez    #Uncontrolled DM ( last A1c 12) formerly on insulin pump now on b/b  - apparently takes lantus 60u qAM and lispro 30 qAC  - f/u repeat A1c, monitor fs and adjust accordingly  - may need endocrine c/s    #Hypothyroid  - cont home synthyroid    #GERD  - cont home ppi    #Depression/Anxiety  - cont home duloxetine 30 tid    #Restless leg syndrome  - on valium 2mg bid prn at home    #Microcytic Anemia  - f/u iron panel and monitor cbc    #H/o Diverticulitis and Celiac Disease  - Nutrition f/u and vitamin replacement prn    DVT ppx: on effient  GI ppx: ppi  Activity: iat  Diet: aat    MED REC CONFIRMED WITH WIFE 68 yo M w HTN, HLD, CAD/MI s/p CABG/VERONICA, severe HF/ICM s/p AICD, Uncontrolled DM w peripheral neuropathy, R AKA, MRSA bacteremia, Psoriasis (on topical steroids), L ankle surgery with fixation hardware presents for recurrent cellulitis. Patient started on Abx and admitted for further evaluation.    #Recurrent LLE Cellulitis in setting of mrsa bacteremia and numerous joint infections in past - not septic on admission  #h/o b/l wrist Psoriasis (on topical steroids per dermatologist) - both wrapped  - LLE erythematous with ulcers/abrasions/bullae  - started vanc and cefepime  - ID consult placed; may need burn/derm eval  - f/u venous & arterial duplex; check esr/crp, f/u blood cultures and mrsa nares  - last JOHN PAUL 01/2022 without vegetations  - pain control prn    #HTN / HLD  #H/o CAD/MI s/p CABG/VERONICA  #severe HF/ICM s/p AICD  - cont home asa, statin, dig, effient, toprol; takes lasix prn for swelling  - cont outpatient follow up w Dr. Lopez    #Uncontrolled DM ( last A1c 12) formerly on insulin pump now on b/b  - apparently takes lantus 60u qAM and lispro 30 qAC  - f/u repeat A1c, monitor fs and adjust accordingly  - may need endocrine c/s    #Hypothyroid  - cont home synthyroid    #GERD  - cont home ppi    #Depression/Anxiety  - cont home duloxetine 30 tid    #Restless leg syndrome  - on valium 2mg bid prn at home    #Microcytic Anemia  - f/u iron panel and monitor cbc    #H/o Diverticulitis and Celiac Disease  - Nutrition f/u and vitamin replacement prn    DVT ppx: on effient  GI ppx: ppi  Activity: iat  Diet: aat    MED REC CONFIRMED WITH WIFE

## 2023-01-11 NOTE — H&P ADULT - NSICDXPASTSURGICALHX_GEN_ALL_CORE_FT
PAST SURGICAL HISTORY:  AICD (automatic cardioverter/defibrillator) present [facility]    Cholecystostomy care drain inserted 2020 & removed 4 months ago    Elective surgery plastic surgery Left shin    H/O shoulder surgery right    History of hip replacement, total, right     History of open reduction and internal fixation (ORIF) procedure right hip    History of tonsillectomy     Other postprocedural status Fixation hardware in foot LEFT    S/P CABG x 1 2018    S/P TKR (total knee replacement), right with infection Mrsa   per pt he was cleared from MRSA infection    Stented coronary artery 10/18 heart attack  INFERIOR WALL MI    Surgery, elective right knee wound debridement     Negative

## 2023-01-11 NOTE — H&P ADULT - HISTORY OF PRESENT ILLNESS
68 yo M w HTN, HLD, CAD/MI s/p CABG/VERONICA, severe HF/ICM s/p AICD, Uncontrolled DM w peripheral neuropathy, R AKA, MRSA bacteremia, Psoriasis (on topical steroids), L ankle surgery with fixation hardware presents for recurrent cellulitis.     Of note, patieint had a R TKA several years ago c/b recurrent PJI and multiple revisions  in 09/2020 by Dr. Castro (explant and abx spacer exchange), and most recently transferred to [facility] on 1/6/22  from [facility] for MRSA bacteremia due to recurrent PJI s/p Revision gastroc flap by PRS, antibiotic cement spacer removal, I&D, replacement on 1/6/22, and subsequent OR with vascular on 1/10/22 for right AKA and Closure of MOISES on 1/14/22. Patient also w  history of acute cholecystitis s/p perc sudhir 2/28/20 (drain removed in late May 2020) c/b by cholecystocutaneous excision of cholecystocutaneous fistulous tract with Dr. King in 04/08/2021 and eventual elective resection of fistula tract and partial cholecystectomy in 06/2022.     In ED, T 98.4, /86, HR 77, RR 17, SpO2 99; Labs significant for Hgb 11.8 w MCV 64.7 and increased RDW, Trop .06, BNP 9402 and VBG pCO2 65    Patient started on ABX and admitted for further evaluation. 66 yo M w HTN, HLD, CAD/MI s/p CABG/VERONICA, severe HF/ICM s/p AICD, Uncontrolled DM w peripheral neuropathy, R AKA, MRSA bacteremia, Psoriasis (on topical steroids), L ankle surgery with fixation hardware presents for recurrent cellulitis. Patient says the leg has been gradually worsening over the last few days. He is also experiencing b/l wrist swelling and pain.    Of note, patieint had a R TKA several years ago c/b recurrent PJI and multiple revisions  in 09/2020 by Dr. Castro (explant and abx spacer exchange), and most recently transferred to [facility] on 1/6/22  from [facility] for MRSA bacteremia due to recurrent PJI s/p Revision gastroc flap by PRS, antibiotic cement spacer removal, I&D, replacement on 1/6/22, and subsequent OR with vascular on 1/10/22 for right AKA and Closure of MOISES on 1/14/22. Patient also w  history of acute cholecystitis s/p perc sudhir 2/28/20 (drain removed in late May 2020) c/b by cholecystocutaneous excision of cholecystocutaneous fistulous tract with Dr. King in 04/08/2021 and eventual elective resection of fistula tract and partial cholecystectomy in 06/2022.     In ED, T 98.4, /86, HR 77, RR 17, SpO2 99; Labs significant for Hgb 11.8 w MCV 64.7 and increased RDW, Trop .06, BNP 9402 and VBG pCO2 65    Patient started on ABX and admitted for further evaluation. 66 yo M w HTN, HLD, CAD/MI s/p CABG/VERONICA, severe HF/ICM s/p AICD, Uncontrolled DM w peripheral neuropathy, R AKA, MRSA bacteremia, Psoriasis (on topical steroids), L ankle surgery with fixation hardware presents for worsening rash and tenderness over bilateral hand and left LE associated with redness and drainage over past week. Patient says the leg has been gradually worsening over the last few days. He is also experiencing b/l wrist swelling and pain.    Of note, patieint had a R TKA several years ago c/b recurrent PJI and multiple revisions  in 09/2020 by Dr. Castro (explant and abx spacer exchange), and most recently transferred to [facility] on 1/6/22  from [facility] for MRSA bacteremia due to recurrent PJI s/p Revision gastroc flap by PRS, antibiotic cement spacer removal, I&D, replacement on 1/6/22, and subsequent OR with vascular on 1/10/22 for right AKA and Closure of MOISES on 1/14/22. Patient also w  history of acute cholecystitis s/p perc sudhir 2/28/20 (drain removed in late May 2020) c/b by cholecystocutaneous excision of cholecystocutaneous fistulous tract with Dr. King in 04/08/2021 and eventual elective resection of fistula tract and partial cholecystectomy in 06/2022.     In ED, T 98.4, /86, HR 77, RR 17, SpO2 99; Labs significant for Hgb 11.8 w MCV 64.7 and increased RDW, Trop .06, BNP 9402 and VBG pCO2 65    Patient started on ABX and admitted for further evaluation.

## 2023-01-11 NOTE — H&P ADULT - ATTENDING COMMENTS
EKG reviewed A sense V paced rhythm, sinus  CXR reviewed no bilateral opacity or effusion, noted ICD in place    PHYSICAL EXAM:  General Appearance: NAD, cachetic, normal for age and gender. 	  Neck: No JVD. No neck mass.  Eyes: Conjunctiva clear. No scleral icterus.  ENMT: Moist oral mucosa. No oral lesion.  Cardiovascular: Regular rate and rhythm S1 S2, No JVD, No murmurs.  Respiratory: Bilateral air entry. No wheezes, rales or rhonchi.  Psychiatry: Alert and oriented x 3.  Gastrointestinal:  Soft, Non-tender, Non-distended.  Neurologic: Move all extremities. Non focal exam.  Musculoskeletal/ extremities: No joint swelling. No clubbing, cyanosis or edema. s/p Left BKA.   Vascular: Peripheral pulses palpable over right LE.  Skin/Integumen:  No ecchymoses, No cyanosis. Noted petechial rash with underlying erythema and tenderness over bilateral hand and left foot associated with drainage over left foot.    # Recurrent LLE Cellulitis in setting of mrsa bacteremia and numerous joint infections in past - not septic on admission  # h/o b/l wrist Psoriasis (on topical steroids per dermatologist) - both wrapped  - LLE erythematous with ulcers/abrasions/bullae  - started IV vanc and cefepime, monitor vanc trough  - ID consult placed; may need burn/derm eval  - f/u venous & arterial duplex; check esr/crp, f/u blood cultures and mrsa nares  - last JOHN PAUL 01/2022 without vegetations  - pain control prn  - obtain MRSA nares    # HTN / HLD  # H/o CAD/MI s/p CABG/VERONICA  # Severe HF/ICM s/p AICD, euvolemic on exam  - cont home asa, statin, digoxin, effient, toprol; takes lasix prn for swelling  - cont outpatient follow up w Dr. Lopez    # Uncontrolled DM ( last A1c 12) formerly on insulin pump now on b/b  - apparently takes lantus 60u qAM and lispro 30 qAC  - f/u repeat A1c, monitor fs and adjust accordingly  - may need endocrine c/s    # Hypothyroidism  - cont home synthroid    # GERD  - cont home ppi    # Depression/Anxiety  - cont home duloxetine 30 tid    # Restless leg syndrome  - on valium 2mg bid prn at home    # Microcytic Anemia  - f/u iron panel and monitor cbc    # H/o Diverticulitis and Celiac Disease  - Nutrition f/u and vitamin replacement prn    DVT ppx: on effient  GI ppx: ppi  Code status: full code

## 2023-01-11 NOTE — H&P ADULT - NSHPLABSRESULTS_GEN_ALL_CORE
Labs:                        11.8   10.78 )-----------( 377      ( 10 Kris 2023 20:47 )             41.6    136  |  97<L>  |  47<H>  ----------------------------<  209<H>  5.1<H>   |  32  |  1.1    Ca    9.0      10 Kris 2023 20:47    TPro  6.6  /  Alb  3.3<L>  /  TBili  0.8  /  DBili  x   /  AST  23  /  ALT  20  /  AlkPhos  134<H>  01-10               PT/INR - ( 10 Kris 2023 20:47 )   PT: 17.70 sec;   INR: 1.53 ratio     PTT - ( 10 Kris 2023 20:47 )  PTT:27.5 sec    Troponin T, Serum: 0.06 ng/mL (01-10-23 @ 20:47)    Serum Pro-Brain Natriuretic Peptide: 9402 pg/mL (01-10-23 @ 20:47)

## 2023-01-12 NOTE — CONSULT NOTE ADULT - ASSESSMENT
ASSESSMENT  67y M admitted with CELLULITIS; ELEVATED TROPONIN    HPI:  68 yo M w HTN, HLD, CAD/MI s/p CABG/VERONICA, severe HF/ICM s/p AICD, Uncontrolled DM w peripheral neuropathy, R AKA, MRSA bacteremia, Psoriasis (on topical steroids), L ankle surgery with fixation hardware presents for worsening rash and tenderness over bilateral hand and left LE associated with redness and drainage over past week. Patient says the leg has been gradually worsening over the last few days. He is also experiencing b/l wrist swelling and pain. Patient had a R TKA several years ago c/b recurrent PJI and multiple revisions  in 09/2020 by Dr. Castro (explant and abx spacer exchange), and most recently transferred to [facility] on 1/6/22  from [facility] for MRSA bacteremia due to recurrent PJI s/p Revision gastroc flap by PRS, antibiotic cement spacer removal, I&D, replacement on 1/6/22, and subsequent OR with vascular on 1/10/22 for right AKA and Closure of MOISES on 1/14/22. Patient also w  history of acute cholecystitis s/p perc sudhir 2/28/20 (drain removed in late May 2020) c/b by cholecystocutaneous excision of cholecystocutaneous fistulous tract with Dr. King in 04/08/2021 and eventual elective resection of fistula tract and partial cholecystectomy in 06/2022. Hx right hip replacement.    PROBLEMS  LLE Cellulitis in pt with hx L ankle surgery with fixation hardware  Bilat hand erythema.  Hx psoriasis hands  Hx MRSA bacteremia  wbc 12.2  1/10 B/C x2 negative  ESR 3; CRP 55.5    On cefepime   IVPB 2000 milliGRAM(s) IV Intermittent every 12 hours  vancomycin  IVPB 1250 milliGRAM(s) IV Intermittent every 12 hours      PLAN  - Nasal MRSA PCR  - Await podiatry eval  - Continue Vanco, Cefepime  - Vanco trough prior to 4th dose   - Repeat wbc ASSESSMENT  67y M admitted with CELLULITIS; ELEVATED TROPONIN    HPI:  66 yo M w HTN, HLD, CAD/MI s/p CABG/VERONICA, severe HF/ICM s/p AICD, Uncontrolled DM w peripheral neuropathy, R AKA, MRSA bacteremia, Psoriasis (on topical steroids), L ankle surgery with fixation hardware presents for worsening rash and tenderness over bilateral hand and left LE associated with redness and drainage over past week. Patient says the leg has been gradually worsening over the last few days. He is also experiencing b/l wrist swelling and pain. Patient had a R TKA several years ago c/b recurrent PJI and multiple revisions  in 09/2020 by Dr. Castro (explant and abx spacer exchange), and most recently transferred to [facility] on 1/6/22  from [facility] for MRSA bacteremia due to recurrent PJI s/p Revision gastroc flap by PRS, antibiotic cement spacer removal, I&D, replacement on 1/6/22, and subsequent OR with vascular on 1/10/22 for right AKA and Closure of MOISES on 1/14/22. Patient also w  history of acute cholecystitis s/p perc sudhir 2/28/20 (drain removed in late May 2020) c/b by cholecystocutaneous excision of cholecystocutaneous fistulous tract with Dr. King in 04/08/2021 and eventual elective resection of fistula tract and partial cholecystectomy in 06/2022. Hx right hip replacement.    PROBLEMS  LLE Cellulitis with advanced tinea in pt with hx L ankle surgery with fixation hardware.  The Tinea can serve as a portal of entry for bacteria and recurrent cellulitis  Bilat hand erythema.  Hx psoriasis hands  Hx MRSA bacteremia  wbc 12.2  1/10 B/C x2 negative  ESR 3; CRP 55.5    On cefepime   IVPB 2000 milliGRAM(s) IV Intermittent every 12 hours  vancomycin  IVPB 1250 milliGRAM(s) IV Intermittent every 12 hours      PLAN  - Lifetime ketoconazole ointment or cream q12h to left toes and distal left foot  - Nasal MRSA PCR  - Await podiatry eval  - Continue Vanco, Cefepime  - Vanco trough prior to 4th dose   - Repeat wbc

## 2023-01-12 NOTE — CONSULT NOTE ADULT - ASSESSMENT
Ischemic cardiomyopathy/ Euvolemic   Demand ischemia/ chest pain free  Recurrent cellulitis  - Management as per primary team  - Continue DAPT  - Continue Digoxin and metoprolol   - Will follow as needed

## 2023-01-12 NOTE — CONSULT NOTE ADULT - SUBJECTIVE AND OBJECTIVE BOX
LOIDAFLOWER MADRIGAL  67y, Male  Allergy: ACE inhibitors (Rash)  Entresto (Swelling)      CHIEF COMPLAINT: recurrent cellulitis (11 Jan 2023 03:35)      HPI:  66 yo M w HTN, HLD, CAD/MI s/p CABG/VERONICA, severe HF/ICM s/p AICD, Uncontrolled DM w peripheral neuropathy, R AKA, MRSA bacteremia, Psoriasis (on topical steroids), L ankle surgery with fixation hardware presents for worsening rash and tenderness over bilateral hand and left LE associated with redness and drainage over past week. Patient says the leg has been gradually worsening over the last few days. He is also experiencing b/l wrist swelling and pain.    Of note, patieint had a R TKA several years ago c/b recurrent PJI and multiple revisions  in 09/2020 by Dr. Castro (explant and abx spacer exchange), and most recently transferred to [facility] on 1/6/22  from [facility] for MRSA bacteremia due to recurrent PJI s/p Revision gastroc flap by PRS, antibiotic cement spacer removal, I&D, replacement on 1/6/22, and subsequent OR with vascular on 1/10/22 for right AKA and Closure of MOISES on 1/14/22. Patient also w  history of acute cholecystitis s/p perc sudhir 2/28/20 (drain removed in late May 2020) c/b by cholecystocutaneous excision of cholecystocutaneous fistulous tract with Dr. King in 04/08/2021 and eventual elective resection of fistula tract and partial cholecystectomy in 06/2022.     In ED, T 98.4, /86, HR 77, RR 17, SpO2 99; Labs significant for Hgb 11.8 w MCV 64.7 and increased RDW, Trop .06, BNP 9402 and VBG pCO2 65    Patient started on ABX and admitted for further evaluation. (11 Jan 2023 03:35)    FAMILY HISTORY:  Family history of heart disease (Mother)    Family history of allergies  daughter      PAST MEDICAL & SURGICAL HISTORY:  Status post percutaneous transluminal coronary angioplasty  2 stents      Atherosclerosis of coronary artery  CAD (coronary artery disease)      Osteoarthritis      HLD (hyperlipidemia)      Diabetes  on insulin pump      CHF (congestive heart failure)  EF ~ 25%      History of celiac disease      Diverticulitis      STEMI (ST elevation myocardial infarction)      Diabetic neuropathy  Hands and Feet      Anxiety and depression      Other postprocedural status  Fixation hardware in foot LEFT      Stented coronary artery  10/18 heart attack  INFERIOR WALL MI      S/P CABG x 1  2018      S/P TKR (total knee replacement), right  with infection Mrsa   per pt he was cleared from MRSA infection      Surgery, elective  right knee wound debridement      History of open reduction and internal fixation (ORIF) procedure  right hip      H/O shoulder surgery  right      AICD (automatic cardioverter/defibrillator) present  St Kali      Cholecystostomy care  drain inserted 2020 &amp; removed 4 months ago      History of tonsillectomy      History of hip replacement, total, right      Elective surgery  plastic surgery Left shin          SOCIAL HISTORY  Social History:  Ex-smoker quit 30 years ago. No alcohol use. No illicit drug use. Wheel-chair bound at baseline, pt's wife takes care of him, transfer himself in and out of wheelchair. Pt does not have orthotic yet. (11 Jan 2023 03:35)        ROS  General: Denies fevers, chills, nightsweats, weight loss  HEENT: Denies headache, rhinorrhea, sore throat, eye pain  CV: Denies CP, palpitations  PULM: Denies SOB, cough  GI: Denies abdominal pain, diarrhea  : Denies dysuria, hematuria  MSK: Denies arthralgias  SKIN: Denies rash   NEURO: Denies paresthesias, weakness  PSYCH: Denies depression    VITALS:  T(F): 97.1, Max: 97.6 (01-11-23 @ 16:02)  HR: 91  BP: 121/81  RR: 18Vital Signs Last 24 Hrs  T(C): 36.2 (12 Jan 2023 06:53), Max: 36.4 (11 Jan 2023 16:02)  T(F): 97.1 (12 Jan 2023 06:53), Max: 97.6 (11 Jan 2023 16:02)  HR: 91 (12 Jan 2023 06:53) (89 - 95)  BP: 121/81 (12 Jan 2023 06:53) (113/73 - 153/81)  BP(mean): --  RR: 18 (12 Jan 2023 06:53) (17 - 18)  SpO2: 99% (12 Jan 2023 06:53) (94% - 99%)    Parameters below as of 11 Jan 2023 19:00  Patient On (Oxygen Delivery Method): room air        PHYSICAL EXAM:  Gen: NAD, resting in bed  HEENT: Normocephalic, atraumatic  Neck: supple, no lymphadenopathy  CV: Regular rate & regular rhythm  Lungs: decreased BS at bases, no fremitus  Abdomen: Soft, BS present  Ext: Right AKA  Neuro: non focal, awake  Skin: no rash, no erythema    TESTS & MEASUREMENTS:                        11.1   12.20 )-----------( 350      ( 12 Jan 2023 05:39 )             38.5     01-12    135  |  98  |  35<H>  ----------------------------<  247<H>  5.1<H>   |  28  |  1.2    Ca    8.7      12 Jan 2023 05:39  Phos  3.0     01-12  Mg     1.7     01-12    TPro  6.9  /  Alb  3.3<L>  /  TBili  0.9  /  DBili  x   /  AST  22  /  ALT  18  /  AlkPhos  130<H>  01-12      LIVER FUNCTIONS - ( 12 Jan 2023 05:39 )  Alb: 3.3 g/dL / Pro: 6.9 g/dL / ALK PHOS: 130 U/L / ALT: 18 U/L / AST: 22 U/L / GGT: x               Culture - Blood (collected 01-10-23 @ 20:47)  Source: .Blood Blood-Peripheral  Preliminary Report (01-12-23 @ 03:02):    No growth to date.    Culture - Blood (collected 01-10-23 @ 20:47)  Source: .Blood Blood-Peripheral  Preliminary Report (01-12-23 @ 03:02):    No growth to date.        Blood Gas Venous - Lactate: 1.50 mmol/L (01-10-23 @ 16:32)      INFECTIOUS DISEASES TESTING  HIV-1/2 Combo Result: Nonreact (01-11-23 @ 11:27)      RADIOLOGY & ADDITIONAL TESTS:  I have personally reviewed the last Chest xray  CXR      CT      CARDIOLOGY TESTING  12 Lead ECG:   Ventricular Rate 72 BPM    Atrial Rate 72 BPM    P-R Interval 160 ms    QRS Duration 166 ms    Q-T Interval 400 ms    QTC Calculation(Bazett) 438 ms    P Axis 59 degrees    R Axis 196 degrees    T Axis -8 degrees    Diagnosis Line Atrial-sensed ventricular-paced rhythm  Abnormal ECG    Confirmed by SOUTH LACKEY MD ([id number]) on 1/11/2023 7:21:41 AM (01-10-23 @ 18:00)      MEDICATIONS  aspirin enteric coated 81  atorvastatin 80  cefepime   IVPB 2000  chlorhexidine 2% Cloths 1  dextrose 5%. 1000  dextrose 5%. 1000  dextrose 50% Injectable 25  dextrose 50% Injectable 12.5  dextrose 50% Injectable 25  digoxin     Tablet 250  DULoxetine 30  glucagon  Injectable 1  insulin glargine Injectable (LANTUS) 60  insulin lispro (ADMELOG) corrective regimen sliding scale   insulin lispro Injectable (ADMELOG) 20  levothyroxine 25  metoprolol succinate ER 25  multivitamin 1  pantoprazole    Tablet 40  prasugrel 10  vancomycin  IVPB 1250      ANTIBIOTICS:  cefepime   IVPB 2000 milliGRAM(s) IV Intermittent every 12 hours  vancomycin  IVPB 1250 milliGRAM(s) IV Intermittent every 12 hours      All available historical data has been reviewed         LOIDAFLOWER MADRIGAL  67y, Male  Allergy: ACE inhibitors (Rash)  Entresto (Swelling)      CHIEF COMPLAINT: recurrent cellulitis (11 Jan 2023 03:35)      HPI:  68 yo M w HTN, HLD, CAD/MI s/p CABG/VERONICA, severe HF/ICM s/p AICD, Uncontrolled DM w peripheral neuropathy, R AKA, MRSA bacteremia, Psoriasis (on topical steroids), L ankle surgery with fixation hardware presents for worsening rash and tenderness over bilateral hand and left LE associated with redness and drainage over past week. Patient says the leg has been gradually worsening over the last few days. He is also experiencing b/l wrist swelling and pain.    Of note, patieint had a R TKA several years ago c/b recurrent PJI and multiple revisions  in 09/2020 by Dr. Castro (explant and abx spacer exchange), and most recently transferred to [facility] on 1/6/22  from [facility] for MRSA bacteremia due to recurrent PJI s/p Revision gastroc flap by PRS, antibiotic cement spacer removal, I&D, replacement on 1/6/22, and subsequent OR with vascular on 1/10/22 for right AKA and Closure of MOISES on 1/14/22. Patient also w  history of acute cholecystitis s/p perc sudhir 2/28/20 (drain removed in late May 2020) c/b by cholecystocutaneous excision of cholecystocutaneous fistulous tract with Dr. King in 04/08/2021 and eventual elective resection of fistula tract and partial cholecystectomy in 06/2022.     In ED, T 98.4, /86, HR 77, RR 17, SpO2 99; Labs significant for Hgb 11.8 w MCV 64.7 and increased RDW, Trop .06, BNP 9402 and VBG pCO2 65    Patient started on ABX and admitted for further evaluation. (11 Jan 2023 03:35)    FAMILY HISTORY:  Family history of heart disease (Mother)    Family history of allergies  daughter      PAST MEDICAL & SURGICAL HISTORY:  Status post percutaneous transluminal coronary angioplasty  2 stents      Atherosclerosis of coronary artery  CAD (coronary artery disease)      Osteoarthritis      HLD (hyperlipidemia)      Diabetes  on insulin pump      CHF (congestive heart failure)  EF ~ 25%      History of celiac disease      Diverticulitis      STEMI (ST elevation myocardial infarction)      Diabetic neuropathy  Hands and Feet      Anxiety and depression      Other postprocedural status  Fixation hardware in foot LEFT      Stented coronary artery  10/18 heart attack  INFERIOR WALL MI      S/P CABG x 1  2018      S/P TKR (total knee replacement), right  with infection Mrsa   per pt he was cleared from MRSA infection      Surgery, elective  right knee wound debridement      History of open reduction and internal fixation (ORIF) procedure  right hip      H/O shoulder surgery  right      AICD (automatic cardioverter/defibrillator) present  St Kali      Cholecystostomy care  drain inserted 2020 &amp; removed 4 months ago      History of tonsillectomy      History of hip replacement, total, right      Elective surgery  plastic surgery Left shin          SOCIAL HISTORY  Social History:  Ex-smoker quit 30 years ago. No alcohol use. No illicit drug use. Wheel-chair bound at baseline, pt's wife takes care of him, transfer himself in and out of wheelchair. Pt does not have orthotic yet. (11 Jan 2023 03:35)        ROS  General: Denies fevers, chills, nightsweats, weight loss  HEENT: Denies headache, rhinorrhea, sore throat, eye pain  CV: Denies CP, palpitations  PULM: Denies SOB, cough  GI: Denies abdominal pain, diarrhea  : Denies dysuria, hematuria  MSK: Denies arthralgias  SKIN: Denies rash   NEURO: Denies paresthesias, weakness  PSYCH: Denies depression    VITALS:  T(F): 97.1, Max: 97.6 (01-11-23 @ 16:02)  HR: 91  BP: 121/81  RR: 18Vital Signs Last 24 Hrs  T(C): 36.2 (12 Jan 2023 06:53), Max: 36.4 (11 Jan 2023 16:02)  T(F): 97.1 (12 Jan 2023 06:53), Max: 97.6 (11 Jan 2023 16:02)  HR: 91 (12 Jan 2023 06:53) (89 - 95)  BP: 121/81 (12 Jan 2023 06:53) (113/73 - 153/81)  BP(mean): --  RR: 18 (12 Jan 2023 06:53) (17 - 18)  SpO2: 99% (12 Jan 2023 06:53) (94% - 99%)    Parameters below as of 11 Jan 2023 19:00  Patient On (Oxygen Delivery Method): room air        PHYSICAL EXAM:  Gen: NAD, resting in bed  HEENT: Normocephalic, atraumatic  Neck: supple, no lymphadenopathy  CV: Regular rate & regular rhythm  Lungs: decreased BS at bases, no fremitus  Abdomen: Soft, BS present  Ext: Right AKA, LLE with bulla, erythema & advanced distal foot tinea  Neuro: non focal, awake  Skin: no rash, no erythema    TESTS & MEASUREMENTS:                        11.1   12.20 )-----------( 350      ( 12 Jan 2023 05:39 )             38.5     01-12    135  |  98  |  35<H>  ----------------------------<  247<H>  5.1<H>   |  28  |  1.2    Ca    8.7      12 Jan 2023 05:39  Phos  3.0     01-12  Mg     1.7     01-12    TPro  6.9  /  Alb  3.3<L>  /  TBili  0.9  /  DBili  x   /  AST  22  /  ALT  18  /  AlkPhos  130<H>  01-12      LIVER FUNCTIONS - ( 12 Jan 2023 05:39 )  Alb: 3.3 g/dL / Pro: 6.9 g/dL / ALK PHOS: 130 U/L / ALT: 18 U/L / AST: 22 U/L / GGT: x               Culture - Blood (collected 01-10-23 @ 20:47)  Source: .Blood Blood-Peripheral  Preliminary Report (01-12-23 @ 03:02):    No growth to date.    Culture - Blood (collected 01-10-23 @ 20:47)  Source: .Blood Blood-Peripheral  Preliminary Report (01-12-23 @ 03:02):    No growth to date.        Blood Gas Venous - Lactate: 1.50 mmol/L (01-10-23 @ 16:32)      INFECTIOUS DISEASES TESTING  HIV-1/2 Combo Result: Nonreact (01-11-23 @ 11:27)      RADIOLOGY & ADDITIONAL TESTS:  I have personally reviewed the last Chest xray  CXR      CT      CARDIOLOGY TESTING  12 Lead ECG:   Ventricular Rate 72 BPM    Atrial Rate 72 BPM    P-R Interval 160 ms    QRS Duration 166 ms    Q-T Interval 400 ms    QTC Calculation(Bazett) 438 ms    P Axis 59 degrees    R Axis 196 degrees    T Axis -8 degrees    Diagnosis Line Atrial-sensed ventricular-paced rhythm  Abnormal ECG    Confirmed by SOUTH LACKEY MD ([id number]) on 1/11/2023 7:21:41 AM (01-10-23 @ 18:00)      MEDICATIONS  aspirin enteric coated 81  atorvastatin 80  cefepime   IVPB 2000  chlorhexidine 2% Cloths 1  dextrose 5%. 1000  dextrose 5%. 1000  dextrose 50% Injectable 25  dextrose 50% Injectable 12.5  dextrose 50% Injectable 25  digoxin     Tablet 250  DULoxetine 30  glucagon  Injectable 1  insulin glargine Injectable (LANTUS) 60  insulin lispro (ADMELOG) corrective regimen sliding scale   insulin lispro Injectable (ADMELOG) 20  levothyroxine 25  metoprolol succinate ER 25  multivitamin 1  pantoprazole    Tablet 40  prasugrel 10  vancomycin  IVPB 1250      ANTIBIOTICS:  cefepime   IVPB 2000 milliGRAM(s) IV Intermittent every 12 hours  vancomycin  IVPB 1250 milliGRAM(s) IV Intermittent every 12 hours      All available historical data has been reviewed

## 2023-01-12 NOTE — CONSULT NOTE ADULT - SUBJECTIVE AND OBJECTIVE BOX
Podiatry Consult Note    Subjective:  FLOWER MARISCAL  Seen Bedside 67y Male  .   Patient is a 67y old  Male who presents with a chief complaint of recurrent cellulitis (12 Jan 2023 09:18)    HPI:  66 yo M w HTN, HLD, CAD/MI s/p CABG/VERONICA, severe HF/ICM s/p AICD, Uncontrolled DM w peripheral neuropathy, R AKA, MRSA bacteremia, Psoriasis (on topical steroids), L ankle surgery with fixation hardware presents for worsening rash and tenderness over bilateral hand and left LE associated with redness and drainage over past week. Patient says the leg has been gradually worsening over the last few days. He is also experiencing b/l wrist swelling and pain.    Of note, patieint had a R TKA several years ago c/b recurrent PJI and multiple revisions  in 09/2020 by Dr. Castro (explant and abx spacer exchange), and most recently transferred to [facility] on 1/6/22  from [facility] for MRSA bacteremia due to recurrent PJI s/p Revision gastroc flap by PRS, antibiotic cement spacer removal, I&D, replacement on 1/6/22, and subsequent OR with vascular on 1/10/22 for right AKA and Closure of MOISES on 1/14/22. Patient also w  history of acute cholecystitis s/p perc sudhir 2/28/20 (drain removed in late May 2020) c/b by cholecystocutaneous excision of cholecystocutaneous fistulous tract with Dr. King in 04/08/2021 and eventual elective resection of fistula tract and partial cholecystectomy in 06/2022.     In ED, T 98.4, /86, HR 77, RR 17, SpO2 99; Labs significant for Hgb 11.8 w MCV 64.7 and increased RDW, Trop .06, BNP 9402 and VBG pCO2 65    Patient started on ABX and admitted for further evaluation. (11 Jan 2023 03:35)      Past Medical History and Surgical History  PAST MEDICAL & SURGICAL HISTORY:  Status post percutaneous transluminal coronary angioplasty  2 stents      Atherosclerosis of coronary artery  CAD (coronary artery disease)      Osteoarthritis      HLD (hyperlipidemia)      Diabetes  on insulin pump      CHF (congestive heart failure)  EF ~ 25%      History of celiac disease      Diverticulitis      STEMI (ST elevation myocardial infarction)      Diabetic neuropathy  Hands and Feet      Anxiety and depression      Other postprocedural status  Fixation hardware in foot LEFT      Stented coronary artery  10/18 heart attack  INFERIOR WALL MI      S/P CABG x 1  2018      S/P TKR (total knee replacement), right  with infection Mrsa   per pt he was cleared from MRSA infection      Surgery, elective  right knee wound debridement      History of open reduction and internal fixation (ORIF) procedure  right hip      H/O shoulder surgery  right      AICD (automatic cardioverter/defibrillator) present  St Kali      Cholecystostomy care  drain inserted 2020 &amp; removed 4 months ago      History of tonsillectomy      History of hip replacement, total, right      Elective surgery  plastic surgery Left shin           Review of Systems:  [X] Ten point review of systems is otherwise negative except as noted     Objective:  Vital Signs Last 24 Hrs  T(C): 35.6 (12 Jan 2023 15:54), Max: 36.2 (12 Jan 2023 06:53)  T(F): 96 (12 Jan 2023 15:54), Max: 97.1 (12 Jan 2023 06:53)  HR: 77 (12 Jan 2023 15:54) (77 - 95)  BP: 124/61 (12 Jan 2023 15:54) (121/81 - 153/81)  BP(mean): --  RR: 18 (12 Jan 2023 06:53) (17 - 18)  SpO2: 99% (12 Jan 2023 15:54) (94% - 99%)    Parameters below as of 11 Jan 2023 19:00  Patient On (Oxygen Delivery Method): room air                            11.1   12.20 )-----------( 350      ( 12 Jan 2023 05:39 )             38.5                 01-12    135  |  98  |  35<H>  ----------------------------<  247<H>  5.1<H>   |  28  |  1.2    Ca    8.7      12 Jan 2023 05:39  Phos  3.0     01-12  Mg     1.7     01-12    TPro  6.9  /  Alb  3.3<L>  /  TBili  0.9  /  DBili  x   /  AST  22  /  ALT  18  /  AlkPhos  130<H>  01-12    A-Duplex; 1/11/23  Impression:  Normal arterial flow in the left lower extremity.      Physical Exam - Lower Extremity Focused:   Derm:  Scab over the L 1st MPJ, skin appears tight, erythematous, with 2 small bullae on the anterior aspect of the ankle.  Vascular: DP and PT Pulses Diminished; Foot is cold to Warm to the touch; Capillary Refill Time < 5 Seconds;    Neuro: Protective Sensation Diminished / Moderately Neuropathic   MSK: No pain on palpation, R AKA  Assessment:   - LLE Cellulitis     Plan:  Chart reviewed and Patient evaluated. All Questions and Concerns Addressed and Answered  XR Imaging Left Foot; Reviewed, showed diffuse osteopenia without osseous abnormalities   Weight Bearing Status; WBAT   Recommend; Lower Extremity Arterial Duplex B/L; Reviewed, Normal arterial blood flow   Request ID Consult;   ID recs for antibiotics appreciated    Discussed Plan w/ Dr. Guzmán    Podiatry

## 2023-01-12 NOTE — PROGRESS NOTE ADULT - SUBJECTIVE AND OBJECTIVE BOX
LOIDA FLOWER  67y Male    Patient is a 67y old  Male who presents with a chief complaint of worsening infection of left leg    INTERVAL HPI/OVERNIGHT EVENTS:    ROS: Pt is seen and examined at bedside. Patient is confusion at the time of evaluation as patient just received home dose of valium with pain med. Pt denies any pain, fever, chill, abdominal pain.    Overnight events: No acute events overnight.    Vital Signs Last 24 Hrs  T(C): 35.6 (12 Jan 2023 15:54), Max: 36.2 (12 Jan 2023 06:53)  T(F): 96 (12 Jan 2023 15:54), Max: 97.1 (12 Jan 2023 06:53)  HR: 77 (12 Jan 2023 15:54) (77 - 95)  BP: 124/61 (12 Jan 2023 15:54) (121/81 - 153/81)  BP(mean): --  RR: 18 (12 Jan 2023 06:53) (17 - 18)  SpO2: 99% (12 Jan 2023 15:54) (94% - 99%)    Parameters below as of 11 Jan 2023 19:00  Patient On (Oxygen Delivery Method): room air        ACE inhibitors (Rash)  Entresto (Swelling)      MEDICATIONS  (STANDING):  aspirin enteric coated 81 milliGRAM(s) Oral daily  atorvastatin 80 milliGRAM(s) Oral at bedtime  cefepime   IVPB 2000 milliGRAM(s) IV Intermittent every 12 hours  chlorhexidine 2% Cloths 1 Application(s) Topical <User Schedule>  clotrimazole 1% Cream 1 Application(s) Topical two times a day  dextrose 5%. 1000 milliLiter(s) (50 mL/Hr) IV Continuous <Continuous>  dextrose 5%. 1000 milliLiter(s) (100 mL/Hr) IV Continuous <Continuous>  dextrose 50% Injectable 25 Gram(s) IV Push once  dextrose 50% Injectable 12.5 Gram(s) IV Push once  dextrose 50% Injectable 25 Gram(s) IV Push once  digoxin     Tablet 250 MICROGram(s) Oral daily  DULoxetine 30 milliGRAM(s) Oral <User Schedule>  glucagon  Injectable 1 milliGRAM(s) IntraMuscular once  insulin glargine Injectable (LANTUS) 60 Unit(s) SubCutaneous every morning  insulin lispro (ADMELOG) corrective regimen sliding scale   SubCutaneous three times a day before meals  insulin lispro Injectable (ADMELOG) 20 Unit(s) SubCutaneous three times a day before meals  levothyroxine 25 MICROGram(s) Oral daily  metoprolol succinate ER 25 milliGRAM(s) Oral daily  multivitamin 1 Tablet(s) Oral daily  oxycodone    5 mG/acetaminophen 325 mG 1 Tablet(s) Oral once  pantoprazole    Tablet 40 milliGRAM(s) Oral before breakfast  prasugrel 10 milliGRAM(s) Oral daily  vancomycin  IVPB 1250 milliGRAM(s) IV Intermittent every 12 hours    MEDICATIONS  (PRN):  dextrose Oral Gel 15 Gram(s) Oral once PRN Blood Glucose LESS THAN 70 milliGRAM(s)/deciliter  diazepam    Tablet 2 milliGRAM(s) Oral two times a day PRN restless leg  oxycodone    5 mG/acetaminophen 325 mG 1 Tablet(s) Oral every 6 hours PRN Severe Pain (7 - 10)      PHYSICAL EXAM:  General Appearance: NAD, cachetic, normal for age and gender. 	  Neck: No JVD. No neck mass.  Eyes: Conjunctiva clear. No scleral icterus.  ENMT: Moist oral mucosa. No oral lesion.  Cardiovascular: Regular rate and rhythm S1 S2, No JVD, No murmurs.  Respiratory: Bilateral air entry. No wheezes, rales or rhonchi.  Psychiatry: Alert and oriented x 2. Calm.  Gastrointestinal:  Soft, Non-tender, Non-distended.  Neurologic: Move all extremities. Non focal exam.  Musculoskeletal/ extremities: No joint swelling. No clubbing, cyanosis or edema. s/p Left AKA.   Vascular: Peripheral pulses palpable over right LE.  Skin/Integumen:  No ecchymoses, No cyanosis. Noted petechial rash with underlying erythema and tenderness over bilateral hand and left foot associated with drainage over left foot. New bullae over dorsal part of left foot.    LABS:                        11.1   12.20 )-----------( 350      ( 12 Jan 2023 05:39 )             38.5     01-12    135  |  98  |  35<H>  ----------------------------<  247<H>  5.1<H>   |  28  |  1.2    Ca    8.7      12 Jan 2023 05:39  Phos  3.0     01-12  Mg     1.7     01-12    TPro  6.9  /  Alb  3.3<L>  /  TBili  0.9  /  DBili  x   /  AST  22  /  ALT  18  /  AlkPhos  130<H>  01-12    PT/INR - ( 10 Kris 2023 20:47 )   PT: 17.70 sec;   INR: 1.53 ratio         PTT - ( 10 Kris 2023 20:47 )  PTT:27.5 sec    Culture - Blood (collected 10 Kris 2023 20:47)  Source: .Blood Blood-Peripheral  Preliminary Report (12 Jan 2023 03:02):    No growth to date.    Culture - Blood (collected 10 Kirs 2023 20:47)  Source: .Blood Blood-Peripheral  Preliminary Report (12 Jan 2023 03:02):    No growth to date.        RADIOLOGY & ADDITIONAL TESTS:  < from: VA Duplex Low Ext Arterial, Ltd, Left (01.11.23 @ 07:53) >  Impression:  Normal arterial flow in the left lower extremity.

## 2023-01-12 NOTE — CONSULT NOTE ADULT - SUBJECTIVE AND OBJECTIVE BOX
Patient is a 67y old  Male who presents with a chief complaint of recurrent cellulitis (12 Jan 2023 17:30)      HPI:  66 yo M w HTN, HLD, CAD/MI s/p CABG/VERONICA, severe HF/ICM s/p AICD, Uncontrolled DM w peripheral neuropathy, R AKA, MRSA bacteremia, Psoriasis (on topical steroids), L ankle surgery with fixation hardware presents for worsening rash and tenderness over bilateral hand and left LE associated with redness and drainage over past week. Patient says the leg has been gradually worsening over the last few days. He is also experiencing b/l wrist swelling and pain.  He denies chest pain or SOB. No palpitation, orthopnea or PND.    PAST MEDICAL & SURGICAL HISTORY:  Status post percutaneous transluminal coronary angioplasty  2 stents  Atherosclerosis of coronary artery  CAD (coronary artery disease)  Osteoarthritis  HLD (hyperlipidemia)  Diabetes  on insulin pump  CHF (congestive heart failure)  EF ~ 25%  History of celiac disease  Diverticulitis  STEMI (ST elevation myocardial infarction)  Diabetic neuropathy  Hands and Feet  Anxiety and depression  Other postprocedural status  Fixation hardware in foot LEFT  Stented coronary artery  10/18 heart attack  INFERIOR WALL MI  S/P CABG x 1  2018  S/P TKR (total knee replacement), right  with infection Mrsa   per pt he was cleared from MRSA infection  Surgery, elective  right knee wound debridement  History of open reduction and internal fixation (ORIF) procedure  right hip  H/O shoulder surgery  right  AICD (automatic cardioverter/defibrillator) present  St Kali  Cholecystostomy care  drain inserted 2020 &amp; removed 4 months ago  History of tonsillectomy  History of hip replacement, total, right  Elective surgery  plastic surgery Left shin        PREVIOUS DIAGNOSTIC TESTING:      ECHO  FINDINGS:    STRESS  FINDINGS:    CATHETERIZATION  FINDINGS:    MEDICATIONS  (STANDING):  aspirin enteric coated 81 milliGRAM(s) Oral daily  atorvastatin 80 milliGRAM(s) Oral at bedtime  cefepime   IVPB 2000 milliGRAM(s) IV Intermittent every 12 hours  chlorhexidine 2% Cloths 1 Application(s) Topical <User Schedule>  clotrimazole 1% Cream 1 Application(s) Topical two times a day  dextrose 5%. 1000 milliLiter(s) (50 mL/Hr) IV Continuous <Continuous>  dextrose 5%. 1000 milliLiter(s) (100 mL/Hr) IV Continuous <Continuous>  dextrose 50% Injectable 25 Gram(s) IV Push once  dextrose 50% Injectable 12.5 Gram(s) IV Push once  dextrose 50% Injectable 25 Gram(s) IV Push once  digoxin     Tablet 250 MICROGram(s) Oral daily  DULoxetine 30 milliGRAM(s) Oral <User Schedule>  enoxaparin Injectable 40 milliGRAM(s) SubCutaneous every 24 hours  glucagon  Injectable 1 milliGRAM(s) IntraMuscular once  insulin glargine Injectable (LANTUS) 60 Unit(s) SubCutaneous every morning  insulin lispro (ADMELOG) corrective regimen sliding scale   SubCutaneous three times a day before meals  insulin lispro Injectable (ADMELOG) 20 Unit(s) SubCutaneous three times a day before meals  levothyroxine 25 MICROGram(s) Oral daily  metoprolol succinate ER 25 milliGRAM(s) Oral daily  multivitamin 1 Tablet(s) Oral daily  pantoprazole    Tablet 40 milliGRAM(s) Oral before breakfast  prasugrel 10 milliGRAM(s) Oral daily    MEDICATIONS  (PRN):  dextrose Oral Gel 15 Gram(s) Oral once PRN Blood Glucose LESS THAN 70 milliGRAM(s)/deciliter  diazepam    Tablet 2 milliGRAM(s) Oral two times a day PRN restless leg  oxycodone    5 mG/acetaminophen 325 mG 1 Tablet(s) Oral every 6 hours PRN Severe Pain (7 - 10)      FAMILY HISTORY:  Family history of heart disease (Mother)    Family history of allergies  daughter        SOCIAL HISTORY:  CIGARETTES:    ALCOHOL:    REVIEW OF SYSTEMS:  NECK: No pain or stiffness  RESPIRATORY: No cough, wheezing, chills or hemoptysis; No shortness of breath  CARDIOVASCULAR: No chest pain, palpitations, dizziness, or leg swelling  GASTROINTESTINAL: No abdominal or epigastric pain. No nausea, vomiting, or hematemesis; No diarrhea or constipation. No melena or hematochezia.  GENITOURINARY: No dysuria, frequency, hematuria, or incontinence  NEUROLOGICAL: No headaches, memory loss, loss of strength, numbness, or tremors          Vital Signs Last 24 Hrs  T(C): 36 (12 Jan 2023 21:01), Max: 36.2 (12 Jan 2023 06:53)  T(F): 96.8 (12 Jan 2023 21:01), Max: 97.1 (12 Jan 2023 06:53)  HR: 78 (12 Jan 2023 21:01) (77 - 95)  BP: 118/64 (12 Jan 2023 21:01) (118/64 - 151/81)  BP(mean): --  RR: 18 (12 Jan 2023 21:01) (18 - 18)  SpO2: 96% (12 Jan 2023 21:01) (96% - 99%)    Parameters below as of 12 Jan 2023 21:01  Patient On (Oxygen Delivery Method): room air            PHYSICAL EXAM:  GENERAL: NAD, Cachectic.  HEAD:  Atraumatic, Normocephalic  NECK: Supple, No JVD, Normal thyroid  NERVOUS SYSTEM:  Alert & Oriented X3  CHEST/LUNG: Clear to percussion bilaterally  HEART: Regular rate and rhythm  ABDOMEN: Soft, Nontender, Nondistended; Bowel sounds present  EXTREMITIES:  Diminished Peripheral Pulses    INTERPRETATION OF TELEMETRY:    ECG:    I&O's Detail    12 Jan 2023 07:01  -  12 Jan 2023 22:47  --------------------------------------------------------  IN:  Total IN: 0 mL    OUT:    Voided (mL): 300 mL  Total OUT: 300 mL    Total NET: -300 mL          LABS:                        11.1   12.20 )-----------( 350      ( 12 Jan 2023 05:39 )             38.5     01-12    135  |  98  |  35<H>  ----------------------------<  247<H>  5.1<H>   |  28  |  1.2    Ca    8.7      12 Jan 2023 05:39  Phos  3.0     01-12  Mg     1.7     01-12    TPro  6.9  /  Alb  3.3<L>  /  TBili  0.9  /  DBili  x   /  AST  22  /  ALT  18  /  AlkPhos  130<H>  01-12    CARDIAC MARKERS ( 11 Jan 2023 16:22 )  x     / 0.05 ng/mL / x     / x     / x      CARDIAC MARKERS ( 11 Jan 2023 11:27 )  x     / 0.05 ng/mL / x     / x     / x              I&O's Summary    12 Jan 2023 07:01  -  12 Jan 2023 22:47  --------------------------------------------------------  IN: 0 mL / OUT: 300 mL / NET: -300 mL        RADIOLOGY & ADDITIONAL STUDIES:        digoxin     Tablet 250 MICROGram(s) Oral daily  metoprolol succinate ER 25 milliGRAM(s) Oral daily

## 2023-01-12 NOTE — PROGRESS NOTE ADULT - ASSESSMENT
68 yo M w HTN, HLD, CAD/MI s/p CABG/VERONICA, severe HF/ICM s/p AICD, Uncontrolled DM w peripheral neuropathy, R AKA, MRSA bacteremia, Psoriasis (on topical steroids), L ankle surgery with fixation hardware presents for recurrent cellulitis. Patient started on Abx and admitted for further evaluation    # Recurrent LLE Cellulitis in setting of mrsa bacteremia and numerous joint infections in past - not septic on admission  # h/o b/l wrist Psoriasis (on topical steroids per dermatologist) - both wrapped  - LLE erythematous with ulcers/abrasions/bullae  - c/w IV vanc and cefepime, monitor vanc trough, elevated today, hold PM dose and readjust tomorrow  - follow up blood culture and MRSA nares  - pain control prn  - follow up podiatry and ID    # HTN / HLD  # H/o CAD/MI s/p CABG/VERONICA  # Severe HF/ICM s/p AICD, euvolemic on exam  - c/w home asa, statin, digoxin, effient, toprol; takes lasix prn for swelling  - c/w outpatient follow up with Dr. Lopez    # Elevated troponin, stable  - no active chest pain, monitor for symptom    # Uncontrolled DM ( last A1c 12) formerly on insulin pump now on b/b  - apparently takes lantus 60u qAM and lispro 30 qAC, pt refused morning lantus today  - f/u repeat A1c, monitor fs and adjust accordingly  - may need endocrine c/s    # Hypothyroidism  - c/w home synthroid    # GERD  - c/w home PPI    # Depression/Anxiety  - c/w home duloxetine 30mg TID    # Restless leg syndrome  - on valium 2mg bid prn at home    # Microcytic Anemia  - f/u iron panel and monitor cbc    # H/o Diverticulitis and Celiac Disease  - Nutrition f/u and vitamin replacement prn    DVT ppx: on lovenox subcut  GI ppx: ppi  Code status: full code

## 2023-01-12 NOTE — ED ADULT NURSE REASSESSMENT NOTE - NS ED NURSE REASSESS COMMENT FT1
5/11/21 1st Attempt:   Attempted to reach Grandmother to schedule follow up visit with lukas Mitchell. VM full, unable to leave message.   Hand off report given by previous nurse Tish. PT is admit to tele waiting for bed and is on cardiac monitor. PT denies pain on left leg and b/l arms. Plan of care explained to pt. Bed alarm on and call bell within reach. Comfort measure in place. Will continue to monitor pt.

## 2023-01-13 NOTE — PROGRESS NOTE ADULT - SUBJECTIVE AND OBJECTIVE BOX
SUBJECTIVE:  Patient is a 67y old Male who presents with a chief complaint of recurrent cellulitis (13 Jan 2023 10:34)   Cellulitis     Today is hospital day 2d. There are no new issues or overnight events.     HPI  HPI:  68 yo M w HTN, HLD, CAD/MI s/p CABG/VERONICA, severe HF/ICM s/p AICD, Uncontrolled DM w peripheral neuropathy, R AKA, MRSA bacteremia, Psoriasis (on topical steroids), L ankle surgery with fixation hardware presents for worsening rash and tenderness over bilateral hand and left LE associated with redness and drainage over past week. Patient says the leg has been gradually worsening over the last few days. He is also experiencing b/l wrist swelling and pain.    Of note, patieint had a R TKA several years ago c/b recurrent PJI and multiple revisions  in 09/2020 by Dr. Castro (explant and abx spacer exchange), and most recently transferred to [facility] on 1/6/22  from [facility] for MRSA bacteremia due to recurrent PJI s/p Revision gastroc flap by PRS, antibiotic cement spacer removal, I&D, replacement on 1/6/22, and subsequent OR with vascular on 1/10/22 for right AKA and Closure of MOISES on 1/14/22. Patient also w  history of acute cholecystitis s/p perc sudhir 2/28/20 (drain removed in late May 2020) c/b by cholecystocutaneous excision of cholecystocutaneous fistulous tract with Dr. King in 04/08/2021 and eventual elective resection of fistula tract and partial cholecystectomy in 06/2022.     In ED, T 98.4, /86, HR 77, RR 17, SpO2 99; Labs significant for Hgb 11.8 w MCV 64.7 and increased RDW, Trop .06, BNP 9402 and VBG pCO2 65    Patient started on ABX and admitted for further evaluation. (11 Jan 2023 03:35)      PAST MEDICAL & SURGICAL HISTORY  Status post percutaneous transluminal coronary angioplasty  2 stents    Atherosclerosis of coronary artery  CAD (coronary artery disease)    Osteoarthritis    HLD (hyperlipidemia)    Diabetes  on insulin pump    CHF (congestive heart failure)  EF ~ 25%    History of celiac disease    Diverticulitis    STEMI (ST elevation myocardial infarction)    Diabetic neuropathy  Hands and Feet    Anxiety and depression    Other postprocedural status  Fixation hardware in foot LEFT    Stented coronary artery  10/18 heart attack  INFERIOR WALL MI    S/P CABG x 1  2018    S/P TKR (total knee replacement), right  with infection Mrsa   per pt he was cleared from MRSA infection    Surgery, elective  right knee wound debridement    History of open reduction and internal fixation (ORIF) procedure  right hip    H/O shoulder surgery  right    AICD (automatic cardioverter/defibrillator) present  St Kali    Cholecystostomy care  drain inserted 2020 &amp; removed 4 months ago    History of tonsillectomy    History of hip replacement, total, right    Elective surgery  plastic surgery Left shin      ALLERGIES:  ACE inhibitors (Rash)  Entresto (Swelling)    MEDICATIONS:  HOME MEDICATIONS  aspirin 81 mg oral delayed release tablet: 1 tab(s) orally once a day  diazePAM 2 mg oral tablet: 1 tab(s) orally 2 times a day, As Needed  digoxin 250 mcg (0.25 mg) oral tablet: 1 tab(s) orally once a day  DULoxetine 30 mg oral delayed release capsule: 1 cap(s) orally 3 times a day  HumaLOG 100 units/mL injectable solution: 30 unit(s) injectable 3 times a day (with meals)  Lantus 100 units/mL subcutaneous solution: 60 unit(s) subcutaneous once a day  Lasix 40 mg oral tablet: 1 tab(s) orally once a day, As Needed  levothyroxine 25 mcg (0.025 mg) oral tablet: 1 tab(s) orally once a day  metoprolol succinate 25 mg oral tablet, extended release: 1 tab(s) orally once a day  Multiple Vitamins oral tablet: 1 tab(s) orally once a day  pantoprazole 40 mg oral delayed release tablet: 1 tab(s) orally once a day (before a meal)  prasugrel 10 mg oral tablet: 1 tab(s) orally once a day  rosuvastatin 40 mg oral tablet: 1 tab(s) orally once a day    STANDING MEDICATIONS  aspirin enteric coated 81 milliGRAM(s) Oral daily  atorvastatin 80 milliGRAM(s) Oral at bedtime  bacitracin   Ointment 1 Application(s) Topical two times a day  cefepime   IVPB 2000 milliGRAM(s) IV Intermittent every 8 hours  chlorhexidine 2% Cloths 1 Application(s) Topical <User Schedule>  clotrimazole 1% Cream 1 Application(s) Topical two times a day  dextrose 5%. 1000 milliLiter(s) IV Continuous <Continuous>  dextrose 5%. 1000 milliLiter(s) IV Continuous <Continuous>  dextrose 50% Injectable 25 Gram(s) IV Push once  dextrose 50% Injectable 12.5 Gram(s) IV Push once  dextrose 50% Injectable 25 Gram(s) IV Push once  digoxin     Tablet 250 MICROGram(s) Oral daily  DULoxetine 30 milliGRAM(s) Oral <User Schedule>  enoxaparin Injectable 40 milliGRAM(s) SubCutaneous every 24 hours  glucagon  Injectable 1 milliGRAM(s) IntraMuscular once  insulin glargine Injectable (LANTUS) 60 Unit(s) SubCutaneous every morning  insulin lispro (ADMELOG) corrective regimen sliding scale   SubCutaneous three times a day before meals  insulin lispro Injectable (ADMELOG) 20 Unit(s) SubCutaneous three times a day before meals  latanoprost 0.005% Ophthalmic Solution 1 Drop(s) Both EYES at bedtime  levothyroxine 25 MICROGram(s) Oral daily  metoprolol succinate ER 25 milliGRAM(s) Oral daily  multivitamin 1 Tablet(s) Oral daily  pantoprazole    Tablet 40 milliGRAM(s) Oral before breakfast  prasugrel 10 milliGRAM(s) Oral daily  vancomycin  IVPB 1000 milliGRAM(s) IV Intermittent every 24 hours    PRN MEDICATIONS  dextrose Oral Gel 15 Gram(s) Oral once PRN  diazepam    Tablet 2 milliGRAM(s) Oral two times a day PRN  oxycodone    5 mG/acetaminophen 325 mG 1 Tablet(s) Oral every 6 hours PRN    VITALS:   T(C): 36.7 (01-13-23 @ 04:05), Max: 36.7 (01-13-23 @ 04:05)  T(F): 98.1 (01-13-23 @ 04:05), Max: 98.1 (01-13-23 @ 04:05)  HR: 99 (01-13-23 @ 04:05) (77 - 99)  BP: 142/81 (01-13-23 @ 04:05) (118/64 - 142/81)  BP(mean): --  ABP: --  ABP(mean): --  RR: 18 (01-13-23 @ 04:05) (18 - 18)  SpO2: 96% (01-12-23 @ 21:01) (96% - 99%)  LABS:                        11.2   10.53 )-----------( 366      ( 13 Jan 2023 06:23 )             40.1     01-13    138  |  97<L>  |  32<H>  ----------------------------<  215<H>  5.1<H>   |  28  |  1.2    Ca    9.3      13 Jan 2023 06:23  Phos  2.9     01-13  Mg     2.1     01-13    TPro  7.3  /  Alb  3.5  /  TBili  1.2  /  DBili  x   /  AST  24  /  ALT  20  /  AlkPhos  130<H>  01-13        I&O's Detail    12 Jan 2023 07:01  -  13 Jan 2023 07:00  --------------------------------------------------------  IN:  Total IN: 0 mL    OUT:    Voided (mL): 300 mL  Total OUT: 300 mL    Total NET: -300 mL                Culture - Blood (collected 10 Kris 2023 20:47)  Source: .Blood Blood-Peripheral  Preliminary Report (12 Jan 2023 03:02):    No growth to date.    Culture - Blood (collected 10 Kris 2023 20:47)  Source: .Blood Blood-Peripheral  Preliminary Report (12 Jan 2023 03:02):    No growth to date.      CARDIAC MARKERS ( 11 Jan 2023 16:22 )  x     / 0.05 ng/mL / x     / x     / x      CARDIAC MARKERS ( 11 Jan 2023 11:27 )  x     / 0.05 ng/mL / x     / x     / x          RADIOLOGY:  EKG  12 Lead ECG:   Ventricular Rate 72 BPM    Atrial Rate 72 BPM    P-R Interval 160 ms    QRS Duration 166 ms    Q-T Interval 400 ms    QTC Calculation(Bazett) 438 ms    P Axis 59 degrees    R Axis 196 degrees    T Axis -8 degrees    Diagnosis Line Atrial-sensed ventricular-paced rhythm  Abnormal ECG    Confirmed by SOUTH LACKEY MD ([id number]) on 1/11/2023 7:21:41 AM (01-10-23 @ 18:00)  12 Lead ECG:   Ventricular Rate 83 BPM    Atrial Rate 83 BPM    P-R Interval 164 ms    QRS Duration 184 ms    Q-T Interval 412 ms    QTC Calculation(Bazett) 484 ms    P Axis 49 degrees    R Axis -39 degrees    T Axis 80 degrees    Diagnosis Line Electronic ventricular pacemaker    Confirmed by Yohana Arriaga ([id number]) on 7/1/2022 1:08:08 PM (06-27-22 @ 12:27)    Xray Chest 1 View-PORTABLE IMMEDIATE:   ACC: [id number] EXAM:  XR CHEST PORTABLE IMMED 1V                          PROCEDURE DATE:  01/10/2023          INTERPRETATION:  Clinical History / Reason for exam: Admission chest   radiograph.    Comparison : Chest radiograph dated June 27, 2022.    Technique/Positioning: Portable frontal.    Findings:    Support devices: Left chest wall AICD in place.    Cardiac/mediastinum/hilum: Stable.    Lung parenchyma/Pleura: Low lung volumes. No acute pulmonary   consolidation, pleural effusion or pneumothorax.    Skeleton/soft tissues: Stable.    Impression:    No radiographic evidence of acute cardiopulmonary disease.        --- End of Report ---            FATOUMATA JI MD; Attending Radiologist  This document has been electronically signed. Jan 11 2023  3:08PM (01-10-23 @ 17:25)    Physical Exam:  General: no acute distress, lying in bed  Head: normocephalic and atraumatic  Neck: supple  Heart: regular rate and rhythm, S1 and S2 normal, no murmurs, rubs or gallops noted on exam  Lungs: Symmetric chest expansion bilaterally, clear lungs bilaterally, no wheezes rhonchi or crackles heard  Abdomen: Bowel sounds present, non tender on light and deep palpation  Extremities: No edema, no clubbing or cyanosis notes, red skin pealing and rash on bilateral hands and left leg

## 2023-01-13 NOTE — PROGRESS NOTE ADULT - ASSESSMENT
PROBLEMS  LLE Cellulitis with advanced tinea in pt with hx L ankle surgery with fixation hardware.  The Tinea can serve as a portal of entry for bacteria and recurrent cellulitis  Bilat hand erythema.  Hx psoriasis hands  Hx MRSA bacteremia  wbc normalized  1/10 B/C x2 negative  ESR 3; CRP 55.5  Pt seen by Podiatry    On cefepime   IVPB 2000 milliGRAM(s) IV Intermittent every 12 hours  vancomycin  IVPB 1000 milliGRAM(s) IV Intermittent every 24 hours  clotrimazole 1% Cream 1    PLAN  - Continue clotrimazole 1% Cream   - Nasal MRSA PCR  - Continue Vanco, Cefepime  - Vanco trough prior to 4th dose

## 2023-01-13 NOTE — PROGRESS NOTE ADULT - ASSESSMENT
68 yo M PMHx HTN, HLD, CAD/MI s/p CABG/VERONICA, severe HF/ICM s/p AICD, uncontrolled DM w peripheral neuropathy, R AKA, MRSA bacteremia, psoriasis, L ankle surgery with fixation hardware presents for recurrent cellulitis. Patient started on Abx and admitted for further evaluation    Recurrent LLE Cellulitis   Has histsory of MRSA bacteremia and numerous joint infections   - not septic on admission  - ID eval appreciated, c/w vancomycin, cefepime  - check vanco trough  - LLE erythematous with ulcers/abrasions/bullae  - LLE extremely cold to touch with diminished pulses. Arterial duplex from 1/11 showed no occlusion. Vascular cardiology consult requested for further eval. Perhaps pt needs angiogram?    Psoriasis  - not on topical steroids here or at home  - can restart steroids when cellulitis resolves  - needs outpt dermatology follow up, may benefit from immunomodulating agents    Mental status  - as per nursing staff pt has been aao 1-2  - I spoke with wife, pt is normally aaox 3. She believes this is due to valium/percocet use. Since pt has no focal deficits she agrees       HTN / HLD  CAD/MI s/p CABG/VERONICA  Severe HF/ICM s/p AICD, euvolemic on exam  - c/w home asa, statin, digoxin, effient, toprol; takes lasix prn for swelling  - c/w outpatient follow up with Dr. Lopez    Elevated troponin  - chronically elevated  - no active chest pain, monitor for symptom  - cardio eval appreciated  - d/c telemetry    Uncontrolled DM ( last A1c 12) formerly on insulin pump now on b/b  - apparently takes lantus 60u qAM and lispro 30 qAC,   - f/u repeat A1c, monitor fs and adjust accordingly  - may need endocrine c/s    Hypothyroidism  - c/w home synthroid    GERD  - c/w home PPI    Depression/Anxiety  - c/w home duloxetine 30mg TID    Restless leg syndrome  - on valium 2mg bid prn at home    Microcytic Anemia  - f/u iron panel and monitor cbc     Diverticulitis and Celiac Disease  - Nutrition f/u and vitamin replacement prn    DVT ppx: on lovenox subcut  GI ppx: ppi  Code status: full code     #Progress Note Handoff:  Pending (specify):  Dr. Lopez eval of LLE, ABx, MRSA narses,   Family discussion: Spoke with wife Raine via phone. She is a very pleasant woman. we discussed pt's mental status, need for CT, cool extremity, antibiotics  Disposition: Home___/SNF___/Other________/Unknown at this time____x____

## 2023-01-13 NOTE — CHART NOTE - NSCHARTNOTEFT_GEN_A_CORE
IMPRESSION:    Acute toxic metabolic encephalopathy  Refractory severe hypoglycemia s/p D50W x3 and glucagon x1  Recurrent LLE cellulitis   Chronic limb ischemia of LLE  NSTEMI, likely type 2 demand ischemia  HO MRSA bacteremia  HO HTN, HLD, CAD with MI s/p CABG/stents  HO HFrEF (EF 15-20%) s/p AICD  HO diabetes mellitus, uncontrolled (last A1c 12)  HO severe PAD s/p right AKA  HO psoriasis, hypothyroidism, GERD, anxiety/depression, restless leg syndrome, microcytic anemia, Celiac disease    PLAN:    CNS: Avoid sedatives. CT head reviewed. Neurology evaluation appreciated. Routine EEG. Seizure precautions. Hold Valium given AMS.    HEENT: Oral care    PULMONARY: HOB @ 45 degrees. Aspiration precautions    CARDIOVASCULAR: Echo reviewed. Keep even balance. Strict Is and Os. Monitor digoxin level. Continue D10W at 50mL/hr for hypoglycemia. Cardiology evaluation appreciated.    GI: GI prophylaxis. Feeding: NPO until mental status improves. Bowel regimen    RENAL: Follow up renal function and lytes. Correct as needed.    INFECTIOUS DISEASE: Monitor vital signs. Follow up cultures. Antibiotics per ID (vancomycin and cefepime). Vancomycin trough. MRSA nares.    HEMATOLOGICAL: DVT prophylaxis. Vascular evaluation of left lower extremity. Arterial duplex 1/11 showed no occlusion.     ENDOCRINE: Fingersticks Q1H. Keep glucose 140-180. TSH normal. Hold insulin for now. Endocrinology evaluation.    MUSCULOSKELETAL: Bedrest    DISPO: Patient requires MICU monitoring at this time

## 2023-01-13 NOTE — CONSULT NOTE ADULT - ASSESSMENT
Impression:  66 yo M w HTN, HLD, CAD/MI s/p CABG/VERONICA, severe HF/ICM s/p AICD, Uncontrolled DM w peripheral neuropathy, R AKA, MRSA bacteremia, Psoriasis (on topical steroids), L ankle surgery with fixation hardware presents for worsening rash and tenderness over bilateral hand and left LE associated with redness and drainage over past week. Patient confused as per primary team for over 24 hours, called for obtundation. Patients FS found to be 31. Patient also receives valium at home and oxycodone approximately 24 hours ago as inpatient. Patient given D50 and narcan, after 15 minutes regained awareness but remained drowsy. Patient drowsy on exam, able to say his name which is his in hospital baseline as per staff. Patients symptoms likely caused by toxic metabolic cause or hypogycemia vs opiates.     Suggestion:  Routine EEG  Medical management of infectious process  Avoid sedating medications   Seizure precautions  Keep magnesium >2

## 2023-01-13 NOTE — CHART NOTE - NSCHARTNOTEFT_GEN_A_CORE
Rapid response called this afternoon for unresponsiveness. FS was 31. dextrose administered. Pt not reponsive with in 5 minutes. Narcan then administered. He was still altered. Around 15 minutes after administration of dextrose patient became responsive. Since he was altered this morning a stat CTH was ordered and negative. Case d/w neurology. Will check EEG but since he is alert now no further work up. I spke with critical care team for ICU upgrade for q1H FS monitoring but so far he was denied from ICU. FS later decreased to 39 and was given dextrose. Pt was placed on D5 infusion and it is now increased to d10. Suspect pt was simply receiving very high doses of insulin and due to decreased PO intake it overwhelmed his system. Insulin discontinued for now. Will try to check q1H FS for now if permited on floor. I spoke with pt's wife who is aware of situation. She confirmed his full code status. Rapid response called this afternoon for unresponsiveness. FS was 31. dextrose administered. Pt not reponsive with in 5 minutes. Narcan then administered. He was still altered. Around 15 minutes after administration of dextrose patient became responsive. Since he was altered this morning a stat CTH was ordered and negative. Case d/w neurology. Will check EEG but since he is alert now no further work up. I spke with critical care team for ICU upgrade for q1H FS monitoring but so far he was denied from ICU. FS later decreased to 39 and was given dextrose. Pt was placed on D5 infusion and it is now increased to d10. Suspect pt was simply receiving very high doses of insulin and due to decreased PO intake it overwhelmed his system. Insulin discontinued for now. Doubt sepsis as cause as no SIRS criteria met plus pt is on broad spectrum abx. Blood and fungal cultures ordered in case.  Will try to check q1H FS for now if permited on floor. I spoke with pt's wife who is aware of situation. She confirmed his full code status. Rapid response called this afternoon for unresponsiveness. FS was 31. dextrose administered. Pt not reponsive with in 5 minutes. Narcan then administered. He was still altered. Around 15 minutes after administration of dextrose patient became responsive. Since he was altered this morning a stat CTH was ordered and negative. Case d/w neurology. Will check EEG but since he is alert now no further work up. I spke with critical care team for ICU upgrade for q1H FS monitoring but so far he was denied from ICU. FS later decreased to 39 and was given dextrose. Pt was placed on D5 infusion and it is now increased to d10. Suspect pt was simply receiving very high doses of insulin and due to decreased PO intake it overwhelmed his system. Insulin discontinued for now. Doubt sepsis as cause as no SIRS criteria met plus pt is on broad spectrum abx. Blood and fungal cultures ordered in case.  Digoxin in rare cases can cause hypoglycemia so will check dig level. Check lactic acid given pt's cold extremity. If elevated will need stat vascular. Will try to check q1H FS for now if permited on floor. I spoke with pt's wife who is aware of situation. She confirmed his full code status.

## 2023-01-13 NOTE — PROGRESS NOTE ADULT - ASSESSMENT
66 yo M w HTN, HLD, CAD/MI s/p CABG/VERONICA, severe HF/ICM s/p AICD, Uncontrolled DM w peripheral neuropathy, R AKA, MRSA bacteremia, Psoriasis (on topical steroids), L ankle surgery with fixation hardware presents for recurrent cellulitis. Patient started on Abx and admitted for further evaluation    # Recurrent LLE Cellulitis in setting of mrsa bacteremia and numerous joint infections in past - not septic on admission  # h/o b/l wrist Psoriasis (on topical steroids per dermatologist) - both wrapped  - LLE erythematous with ulcers/abrasions/bullae  - c/w IV vanc and cefepime, monitor vanc trough, elevated today, hold PM dose and readjust tomorrow  - follow up blood culture and MRSA nares  - pain control prn  - Podiatry: x-ray left foot demonstrated diffuse osteopenia without osseous abnormalities, arterial duplex negative  -ID: continue clotrimazole [id number]    # HTN / HLD  # H/o CAD/MI s/p CABG/VERONICA  # Severe HF/ICM s/p AICD, euvolemic on exam  - c/w home asa, statin, digoxin, effient, toprol; takes lasix prn for swelling  - c/w outpatient follow up with Dr. Lopez    # Elevated troponin, stable  - no active chest pain, monitor for symptom    # Uncontrolled DM ( last A1c 12) formerly on insulin pump now on b/b  - apparently takes lantus 60u qAM and lispro 30 qAC, pt refused morning lantus today  - f/u repeat A1c, monitor fs and adjust accordingly  - may need endocrine c/s    # Hypothyroidism  - c/w home synthroid    # GERD  - c/w home PPI    # Depression/Anxiety  - c/w home duloxetine 30mg TID    # Restless leg syndrome  - on valium 2mg bid prn at home    # Microcytic Anemia  - f/u iron panel and monitor cbc    # H/o Diverticulitis and Celiac Disease  - Nutrition f/u and vitamin replacement prn    DVT ppx: on lovenox subcut  GI ppx: ppi  Code status: full code   Pending: possible d/c

## 2023-01-13 NOTE — PROGRESS NOTE ADULT - SUBJECTIVE AND OBJECTIVE BOX
CHIEF COMPLAINT:    Patient is a 67y old  Male who presents with a chief complaint of recurrent cellulitis       INTERVAL HPI/OVERNIGHT EVENTS:    Patient seen and examined at bedside. No acute overnight events occurred.    ROS: Denies leg pain. All other systems are negative.    Medications:  Standing  aspirin enteric coated 81 milliGRAM(s) Oral daily  atorvastatin 80 milliGRAM(s) Oral at bedtime  bacitracin   Ointment 1 Application(s) Topical two times a day  cefepime   IVPB 2000 milliGRAM(s) IV Intermittent every 8 hours  chlorhexidine 2% Cloths 1 Application(s) Topical <User Schedule>  clotrimazole 1% Cream 1 Application(s) Topical two times a day  dextrose 5%. 1000 milliLiter(s) IV Continuous <Continuous>  dextrose 5%. 1000 milliLiter(s) IV Continuous <Continuous>  dextrose 50% Injectable 25 Gram(s) IV Push once  dextrose 50% Injectable 25 Gram(s) IV Push once  dextrose 50% Injectable 12.5 Gram(s) IV Push once  digoxin     Tablet 250 MICROGram(s) Oral daily  DULoxetine 30 milliGRAM(s) Oral <User Schedule>  enoxaparin Injectable 40 milliGRAM(s) SubCutaneous every 24 hours  glucagon  Injectable 1 milliGRAM(s) IntraMuscular once  insulin glargine Injectable (LANTUS) 60 Unit(s) SubCutaneous every morning  insulin lispro (ADMELOG) corrective regimen sliding scale   SubCutaneous three times a day before meals  insulin lispro Injectable (ADMELOG) 20 Unit(s) SubCutaneous three times a day before meals  latanoprost 0.005% Ophthalmic Solution 1 Drop(s) Both EYES at bedtime  levothyroxine 25 MICROGram(s) Oral daily  metoprolol succinate ER 25 milliGRAM(s) Oral daily  multivitamin 1 Tablet(s) Oral daily  pantoprazole    Tablet 40 milliGRAM(s) Oral before breakfast  prasugrel 10 milliGRAM(s) Oral daily  vancomycin  IVPB 1000 milliGRAM(s) IV Intermittent every 24 hours    PRN Meds  dextrose Oral Gel 15 Gram(s) Oral once PRN  diazepam    Tablet 2 milliGRAM(s) Oral two times a day PRN  oxycodone    5 mG/acetaminophen 325 mG 1 Tablet(s) Oral every 6 hours PRN        Vital Signs:    T(F): 98.1 (01-13-23 @ 04:05), Max: 98.1 (01-13-23 @ 04:05)  HR: 99 (01-13-23 @ 04:05) (77 - 99)  BP: 142/81 (01-13-23 @ 04:05) (118/64 - 142/81)  RR: 18 (01-13-23 @ 04:05) (18 - 18)  SpO2: 96% (01-12-23 @ 21:01) (96% - 99%)  I&O's Summary    12 Jan 2023 07:01  -  13 Jan 2023 07:00  --------------------------------------------------------  IN: 0 mL / OUT: 300 mL / NET: -300 mL      Daily     Daily   CAPILLARY BLOOD GLUCOSE      POCT Blood Glucose.: 193 mg/dL (13 Jan 2023 11:39)  POCT Blood Glucose.: 230 mg/dL (13 Jan 2023 08:49)      PHYSICAL EXAM:  GENERAL:  NAD  SKIN: psoriasis throughout  HEENT: Atraumatic. Normocephalic. Anicteric  NECK:  No JVD.   PULMONARY: Clear to ausculation bilaterally. No wheezing. No rales  CVS: Normal S1, S2. Regular rate and rhythm. No murmurs.  ABDOMEN/GI: Soft, Nontender, Nondistended; Bowel sounds are present  EXTREMITIES:  Right AKA. LLE cold, diminished dorsal pulse, cellulitis, blisters, psoriasis  NEUROLOGIC:  No motor deficit.  PSYCH: Alert & oriented x 1, normal affect      LABS:                        11.2   10.53 )-----------( 366      ( 13 Jan 2023 06:23 )             40.1     01-13    138  |  97<L>  |  32<H>  ----------------------------<  215<H>  5.1<H>   |  28  |  1.2    Ca    9.3      13 Jan 2023 06:23  Phos  2.9     01-13  Mg     2.1     01-13    TPro  7.3  /  Alb  3.5  /  TBili  1.2  /  DBili  x   /  AST  24  /  ALT  20  /  AlkPhos  130<H>  01-13      Serum Pro-Brain Natriuretic Peptide: 9402 pg/mL (01-10-23 @ 20:47)    Trop 0.05, CKMB --, CK --, 01-11-23 @ 16:22  Trop 0.05, CKMB --, CK --, 01-11-23 @ 11:27  Trop 0.06, CKMB --, CK --, 01-10-23 @ 20:47      Culture - Blood (collected 10 Kris 2023 20:47)  Source: .Blood Blood-Peripheral  Preliminary Report (12 Jan 2023 03:02):    No growth to date.    Culture - Blood (collected 10 Kris 2023 20:47)  Source: .Blood Blood-Peripheral  Preliminary Report (12 Jan 2023 03:02):    No growth to date.        RADIOLOGY & ADDITIONAL TESTS:  Imaging or report Personally Reviewed:  [ ] YES  [ ] NO -->no new images  EKG reviewed independently -->no new EKGs    Consultant(s) Notes Reviewed:  [ ] YES  [ ] NO  Care Discussed with Consultants/Other Providers [ ] YES  [ ] NO    Case discussed with resident  Care discussed with pt

## 2023-01-13 NOTE — CHART NOTE - NSCHARTNOTEFT_GEN_A_CORE
67M PMH: HTN, HLD, CAD/MI s/p CABG/VERONICA, severe HF/ICM s/p AICD, Uncontrolled DM w peripheral neuropathy, R AKA, MRSA bacteremia, ?Psoriasis (on topical steroids), L ankle surgery with fixation hardware.   Presented 1/10 for worsening erythematous rash with draining lesions, swelling and tenderness over bilateral hand, and wrist and LLE x1week.     Of note, patieint had a R TKA several years ago c/b recurrent PJI and multiple revisions in 09/2020 by Dr. 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 Patient also w  history of acute cholecystitis s/p perc sudhir 2/28/20 (drain removed in late May 2020) c/b by cholecystocutaneous excision of cholecystocutaneous fistulous tract with Dr. King in 04/08/2021 and eventual elective resection of fistula tract and partial cholecystectomy in 06/2022.     In ED, T 98.4, /86, HR 77, RR 17, SpO2 99; Labs significant for Hgb 11.8 w MCV 64.7 and increased RDW, Trop .06, BNP 9402 and VBG pCO2 65. Patient started on ABX and admitted for further evaluation. Cardiology evaluated pt 1/12 for elevated troponin. Likely demand ischemia and continued medical management including digoxin and metoprolol. Podiatry following for LE swelling. Lower Extremity Arterial Duplex B/L; Reviewed, Normal arterial blood flow Pt on vancomycin and cefepime for cellulitis - ID following. Pt noted to have cold LE. Vascular consulted to evaluate.    ICU Upgrade:  1/13: Rapid response called on patient for unresponsiveness. Finger stick found to be 31. Pt given D50 and started on D10. Despite treatment repeat fingerstick was 39. Glucagon and D50 again given. Repeat fingerstick was 96; patient having continued altered mental status.     #Follow up  - F/u digoxin level (can cause persistent hypoglycemia)  - rEEG and CTH  - Follow up lactate - if elevated call vascular to eval LE  - Blood cultures, fungal cultures    MEDICATIONS:  STANDING MEDICATIONS  aspirin enteric coated 81 milliGRAM(s) Oral daily  atorvastatin 80 milliGRAM(s) Oral at bedtime  bacitracin   Ointment 1 Application(s) Topical two times a day  cefepime   IVPB 2000 milliGRAM(s) IV Intermittent every 8 hours  chlorhexidine 2% Cloths 1 Application(s) Topical <User Schedule>  clotrimazole 1% Cream 1 Application(s) Topical two times a day  dextrose 10%. 1000 milliLiter(s) IV Continuous <Continuous>  dextrose 5%. 1000 milliLiter(s) IV Continuous <Continuous>  dextrose 5%. 1000 milliLiter(s) IV Continuous <Continuous>  dextrose 50% Injectable 25 Gram(s) IV Push once  dextrose 50% Injectable 12.5 Gram(s) IV Push once  dextrose 50% Injectable 25 Gram(s) IV Push once  digoxin     Tablet 250 MICROGram(s) Oral daily  DULoxetine 30 milliGRAM(s) Oral <User Schedule>  enoxaparin Injectable 40 milliGRAM(s) SubCutaneous every 24 hours  glucagon  Injectable 1 milliGRAM(s) IntraMuscular once  latanoprost 0.005% Ophthalmic Solution 1 Drop(s) Both EYES at bedtime  levothyroxine 25 MICROGram(s) Oral daily  metoprolol succinate ER 25 milliGRAM(s) Oral daily  multivitamin 1 Tablet(s) Oral daily  naloxone Injectable 1 milliGRAM(s) IV Push once  pantoprazole    Tablet 40 milliGRAM(s) Oral before breakfast  prasugrel 10 milliGRAM(s) Oral daily  vancomycin  IVPB 1000 milliGRAM(s) IV Intermittent every 24 hours    PRN MEDICATIONS  dextrose Oral Gel 15 Gram(s) Oral once PRN  diazepam    Tablet 2 milliGRAM(s) Oral two times a day PRN  oxycodone    5 mG/acetaminophen 325 mG 1 Tablet(s) Oral every 6 hours PRN      VITAL SIGNS: Last 24 Hours  T(C): 36.4 (13 Jan 2023 13:00), Max: 36.7 (13 Jan 2023 04:05)  T(F): 97.6 (13 Jan 2023 13:00), Max: 98.1 (13 Jan 2023 04:05)  HR: 79 (13 Jan 2023 13:00) (78 - 99)  BP: 127/76 (13 Jan 2023 13:00) (118/64 - 142/81)  BP(mean): --  RR: 18 (13 Jan 2023 13:00) (18 - 18)  SpO2: 96% (12 Jan 2023 21:01) (96% - 96%)    LABS:                        11.2   10.53 )-----------( 366      ( 13 Jan 2023 06:23 )             40.1     01-13    138  |  97<L>  |  32<H>  ----------------------------<  215<H>  5.1<H>   |  28  |  1.2    Ca    9.3      13 Jan 2023 06:23  Phos  2.9     01-13  Mg     2.1     01-13    TPro  7.3  /  Alb  3.5  /  TBili  1.2  /  DBili  x   /  AST  24  /  ALT  20  /  AlkPhos  130<H>  01-13            Culture - Blood (collected 10 Kris 2023 20:47)  Source: .Blood Blood-Peripheral  Preliminary Report (12 Jan 2023 03:02):    No growth to date.    Culture - Blood (collected 10 Kris 2023 20:47)  Source: .Blood Blood-Peripheral  Preliminary Report (12 Jan 2023 03:02):    No growth to date.          RADIOLOGY:      66 yo M PMHx HTN, HLD, CAD/MI s/p CABG/VERONICA, severe HF/ICM s/p AICD, uncontrolled DM w peripheral neuropathy, R AKA, MRSA bacteremia, psoriasis, L ankle surgery with fixation hardware presents for recurrent cellulitis. Patient started on Abx and admitted for further evaluation    #Persistent hypoglycemia  #Metabolic encephalopathy  - as per nursing staff pt has been aao 1-2  - Per wife, pt is normally aaox 3. She believes this is due to valium/percocet use. Since pt has no focal deficits she agrees   - Fingersticks Q1H. Keep glucose 140-180.   - TSH normal.   - Hold insulin for now.   - Endocrinology evaluation  - C/w D10  - F/u digoxin level (can cause persistent hypoglycemia)  - rEEG and CTH  - Neurology follow up    #Recurrent LLE Cellulitis   #Has histsory of MRSA bacteremia and numerous joint infections   - not septic on admission  - ID eval appreciated, c/w vancomycin, cefepime  - check vanco trough  - LLE erythematous with ulcers/abrasions/bullae  - LLE extremely cold to touch with diminished pulses. Arterial duplex from 1/11 showed no occlusion. Vascular cardiology consult requested for further eval. Perhaps pt needs angiogram?  - Follow up lactate - if elevated call vascular  - Nasal MRSA PCR  - Continue Vanco, Cefepime  - Vanco trough prior to 4th dose     #Psoriasis  - Continue clotrimazole 1% Cream   - needs outpt dermatology follow up, may benefit from immunomodulating agents    #HTN / HLD  #CAD/MI s/p CABG/VERONICA  #Severe HF/ICM s/p AICD, euvolemic on exam  - c/w home asa, statin, digoxin, effient, toprol; takes lasix prn for swelling  - c/w outpatient follow up with Dr. Lopez    #Elevated troponin  - chronically elevated  - no active chest pain, monitor for symptom  - cardio eval appreciated  - d/c telemetry    #Uncontrolled DM ( last A1c 12) formerly on insulin pump now on b/b  - apparently takes lantus 60u qAM and lispro 30 qAC,   - f/u repeat A1c   - Holding insulin for hypoglycemia    #Hypothyroidism  - c/w home synthroid    #GERD  - c/w home PPI    #Depression/Anxiety  - c/w home duloxetine 30mg TID    #Restless leg syndrome  - on valium 2mg bid prn at home    #Microcytic Anemia  - f/u iron panel and monitor cbc     #Diverticulitis and Celiac Disease  - Nutrition f/u and vitamin replacement prn    DVT ppx: on lovenox subcut  GI ppx: ppi  Code status: full code     #Follow up  - F/u digoxin level (can cause persistent hypoglycemia)  - rEEG and CTH  - Follow up lactate - if elevated call vascular to eval LE  - Blood cultures, fungal cultures

## 2023-01-13 NOTE — CONSULT NOTE ADULT - SUBJECTIVE AND OBJECTIVE BOX
Neurology Consult    Patient is a 67y old  Male who presents with a chief complaint of recurrent cellulitis (13 Jan 2023 12:48)      HPI:  66 yo M w HTN, HLD, CAD/MI s/p CABG/VERONICA, severe HF/ICM s/p AICD, Uncontrolled DM w peripheral neuropathy, R AKA, MRSA bacteremia, Psoriasis (on topical steroids), L ankle surgery with fixation hardware presents for worsening rash and tenderness over bilateral hand and left LE associated with redness and drainage over past week. Patient says the leg has been gradually worsening over the last few days. He is also experiencing b/l wrist swelling and pain.    Of note, patieint had a R TKA several years ago c/b recurrent PJI and multiple revisions  in 09/2020 by Dr. Castro (explant and abx spacer exchange), and most recently transferred to [facility] on 1/6/22  from [facility] for MRSA bacteremia due to recurrent PJI s/p Revision gastroc flap by PRS, antibiotic cement spacer removal, I&D, replacement on 1/6/22, and subsequent OR with vascular on 1/10/22 for right AKA and Closure of MOISES on 1/14/22. Patient also w  history of acute cholecystitis s/p perc sudhir 2/28/20 (drain removed in late May 2020) c/b by cholecystocutaneous excision of cholecystocutaneous fistulous tract with Dr. King in 04/08/2021 and eventual elective resection of fistula tract and partial cholecystectomy in 06/2022.     In ED, T 98.4, /86, HR 77, RR 17, SpO2 99; Labs significant for Hgb 11.8 w MCV 64.7 and increased RDW, Trop .06, BNP 9402 and VBG pCO2 65    ****Called STAT for acute obtundation, found to have FS 31      PAST MEDICAL & SURGICAL HISTORY:  Status post percutaneous transluminal coronary angioplasty  2 stents      Atherosclerosis of coronary artery  CAD (coronary artery disease)      Osteoarthritis      HLD (hyperlipidemia)      Diabetes  on insulin pump      CHF (congestive heart failure)  EF ~ 25%      History of celiac disease      Diverticulitis      STEMI (ST elevation myocardial infarction)      Diabetic neuropathy  Hands and Feet      Anxiety and depression      Other postprocedural status  Fixation hardware in foot LEFT      Stented coronary artery  10/18 heart attack  INFERIOR WALL MI      S/P CABG x 1  2018      S/P TKR (total knee replacement), right  with infection Mrsa   per pt he was cleared from MRSA infection      Surgery, elective  right knee wound debridement      History of open reduction and internal fixation (ORIF) procedure  right hip      H/O shoulder surgery  right      AICD (automatic cardioverter/defibrillator) present  [facility] Kali      Cholecystostomy care  drain inserted 2020 &amp; removed 4 months ago      History of tonsillectomy      History of hip replacement, total, right      Elective surgery  plastic surgery Left shin          FAMILY HISTORY:  Family history of heart disease (Mother)    Family history of allergies  daughter        Social History: (-) x 3    Allergies    ACE inhibitors (Rash)  Entresto (Swelling)    Intolerances        MEDICATIONS  (STANDING):  aspirin enteric coated 81 milliGRAM(s) Oral daily  atorvastatin 80 milliGRAM(s) Oral at bedtime  bacitracin   Ointment 1 Application(s) Topical two times a day  cefepime   IVPB 2000 milliGRAM(s) IV Intermittent every 8 hours  chlorhexidine 2% Cloths 1 Application(s) Topical <User Schedule>  clotrimazole 1% Cream 1 Application(s) Topical two times a day  dextrose 5%. 1000 milliLiter(s) (50 mL/Hr) IV Continuous <Continuous>  dextrose 5%. 1000 milliLiter(s) (50 mL/Hr) IV Continuous <Continuous>  dextrose 5%. 1000 milliLiter(s) (100 mL/Hr) IV Continuous <Continuous>  dextrose 50% Injectable 50 milliLiter(s) IV Push once  dextrose 50% Injectable 25 Gram(s) IV Push once  dextrose 50% Injectable 12.5 Gram(s) IV Push once  dextrose 50% Injectable 25 Gram(s) IV Push once  digoxin     Tablet 250 MICROGram(s) Oral daily  DULoxetine 30 milliGRAM(s) Oral <User Schedule>  enoxaparin Injectable 40 milliGRAM(s) SubCutaneous every 24 hours  glucagon  Injectable 1 milliGRAM(s) IntraMuscular once  insulin glargine Injectable (LANTUS) 30 Unit(s) SubCutaneous every morning  insulin lispro (ADMELOG) corrective regimen sliding scale   SubCutaneous three times a day before meals  insulin lispro Injectable (ADMELOG) 10 Unit(s) SubCutaneous three times a day before meals  latanoprost 0.005% Ophthalmic Solution 1 Drop(s) Both EYES at bedtime  levothyroxine 25 MICROGram(s) Oral daily  metoprolol succinate ER 25 milliGRAM(s) Oral daily  multivitamin 1 Tablet(s) Oral daily  pantoprazole    Tablet 40 milliGRAM(s) Oral before breakfast  prasugrel 10 milliGRAM(s) Oral daily  sodium zirconium cyclosilicate 5 Gram(s) Oral once  vancomycin  IVPB 1000 milliGRAM(s) IV Intermittent every 24 hours    MEDICATIONS  (PRN):  dextrose Oral Gel 15 Gram(s) Oral once PRN Blood Glucose LESS THAN 70 milliGRAM(s)/deciliter  diazepam    Tablet 2 milliGRAM(s) Oral two times a day PRN restless leg  oxycodone    5 mG/acetaminophen 325 mG 1 Tablet(s) Oral every 6 hours PRN Severe Pain (7 - 10)      Vital Signs Last 24 Hrs  T(C): 36.4 (13 Jan 2023 13:00), Max: 36.7 (13 Jan 2023 04:05)  T(F): 97.6 (13 Jan 2023 13:00), Max: 98.1 (13 Jan 2023 04:05)  HR: 79 (13 Jan 2023 13:00) (77 - 99)  BP: 127/76 (13 Jan 2023 13:00) (118/64 - 142/81)  BP(mean): --  RR: 18 (13 Jan 2023 13:00) (18 - 18)  SpO2: 96% (12 Jan 2023 21:01) (96% - 99%)    Parameters below as of 12 Jan 2023 21:01  Patient On (Oxygen Delivery Method): room air        Examination:  Awake drowsy patient oriented to name not place time  Exam limited by lethargy. Following simple commands(hold up both arms).  Aphasia exam by lethargy  Nondysarthric.    Eyes: limited by lethargy, reacts to visual threat throughout  Face:  no facial asymmetry.    Formal Muscle Strength Testing: (MRC grade R/L) Bilateral UE no drift RAKA, drift of LLE in presence of lethargy  Reflexes:   2+ b/l pectoralis, biceps, triceps, brachioradialis, patella and Achilles.  Plantar response downgoing b/l.  Jaw jerk, Jada, clonus absent.  Sensory examination: Reacts to noxious stimuli in all extremities.  Cerebellum: limited by mental status        Labs:   CBC Full  -  ( 13 Jan 2023 06:23 )  WBC Count : 10.53 K/uL  RBC Count : 6.17 M/uL  Hemoglobin : 11.2 g/dL  Hematocrit : 40.1 %  Platelet Count - Automated : 366 K/uL  Mean Cell Volume : 65.0 fL  Mean Cell Hemoglobin : 18.2 pg  Mean Cell Hemoglobin Concentration : 27.9 g/dL  Auto Neutrophil # : 8.26 K/uL  Auto Lymphocyte # : 0.89 K/uL  Auto Monocyte # : 1.21 K/uL  Auto Eosinophil # : 0.09 K/uL  Auto Basophil # : 0.05 K/uL  Auto Neutrophil % : 78.3 %  Auto Lymphocyte % : 8.5 %  Auto Monocyte % : 11.5 %  Auto Eosinophil % : 0.9 %  Auto Basophil % : 0.5 %    01-13    138  |  97<L>  |  32<H>  ----------------------------<  215<H>  5.1<H>   |  28  |  1.2    Ca    9.3      13 Jan 2023 06:23  Phos  2.9     01-13  Mg     2.1     01-13    TPro  7.3  /  Alb  3.5  /  TBili  1.2  /  DBili  x   /  AST  24  /  ALT  20  /  AlkPhos  130<H>  01-13    LIVER FUNCTIONS - ( 13 Jan 2023 06:23 )  Alb: 3.5 g/dL / Pro: 7.3 g/dL / ALK PHOS: 130 U/L / ALT: 20 U/L / AST: 24 U/L / GGT: x                   Neuroimaging:  NCHCT:   < from: CT Head No Cont (01.13.23 @ 15:17) >  IMPRESSION:    Motion limited study demonstrating no gross acute intracranial   abnormality. The study may be repeated as clinically warranted.    --- End of Report ---      < end of copied text >    01-13-23 @ 15:25

## 2023-01-13 NOTE — CONSULT NOTE ADULT - NS ATTEND AMEND GEN_ALL_CORE FT
Patient seen and examined and agree with above except as noted.  Patients history, notes, labs, imaging, vitals and meds reviewed personally.  Episode yesterday likely secondary to hypoglycemia    Plan as above

## 2023-01-13 NOTE — PATIENT PROFILE ADULT - FALL HARM RISK - HARM RISK INTERVENTIONS
Assistance with ambulation/Assistance OOB with selected safe patient handling equipment/Communicate Risk of Fall with Harm to all staff/Discuss with provider need for PT consult/Monitor for mental status changes/Monitor gait and stability/Move patient closer to nurses' station/Reinforce activity limits and safety measures with patient and family/Reorient to person, place and time as needed/Tailored Fall Risk Interventions/Toileting schedule using arm’s reach rule for commode and bathroom/Use of alarms - bed, chair and/or voice tab/Visual Cue: Yellow wristband and red socks/Bed in lowest position, wheels locked, appropriate side rails in place/Call bell, personal items and telephone in reach/Instruct patient to call for assistance before getting out of bed or chair/Non-slip footwear when patient is out of bed/[place] to call system/Physically safe environment - no spills, clutter or unnecessary equipment/Purposeful Proactive Rounding/Room/bathroom lighting operational, light cord in reach Assistance with ambulation/Assistance OOB with selected safe patient handling equipment/Communicate Risk of Fall with Harm to all staff/Discuss with provider need for PT consult/Monitor for mental status changes/Monitor gait and stability/Move patient closer to nurses' station/Provide patient with walking aids - walker, cane, crutches/Reinforce activity limits and safety measures with patient and family/Reorient to person, place and time as needed/Tailored Fall Risk Interventions/Toileting schedule using arm’s reach rule for commode and bathroom/Use of alarms - bed, chair and/or voice tab/Visual Cue: Yellow wristband and red socks/Bed in lowest position, wheels locked, appropriate side rails in place/Call bell, personal items and telephone in reach/Instruct patient to call for assistance before getting out of bed or chair/Non-slip footwear when patient is out of bed/[place] to call system/Physically safe environment - no spills, clutter or unnecessary equipment/Purposeful Proactive Rounding/Room/bathroom lighting operational, light cord in reach

## 2023-01-14 NOTE — CONSULT NOTE ADULT - ASSESSMENT
IMPRESSION:    Refractory iatrogenic hypoglycemia  Uncontrolled DM ( last A1c 12) formerly on insulin pump now on b/b  Recurrent LLE Cellulitis in setting of mrsa bacteremia and numerous joint infections in past      PLAN:    CNS: Mental status alteration due to hypoglycemia improved now    HEENT: Oral care    PULMONARY:  HOB @ 45 degrees.  Aspiration precautions.  Wean FiO2 as tolerated.    CARDIOVASCULAR: ASA, statin, no acute concerns.  Resume Digoxin, level slightly elevated, follow-up cardiology.    GI: GI prophylaxis home PPI.  Feeding diabetic diet.  Bowel regimen PRN    RENAL:  Follow up lytes.  Correct as needed.  UOP adequate    INFECTIOUS DISEASE: Follow up cultures.  Continue Vancomycin & Cefepime per ID.  Narrow Abx as able.    HEMATOLOGICAL:  DVT prophylaxis.    ENDOCRINE:  Resume basal-bolus insulin at half-dose.  D10 at 50cc/hr now, d/c now. BG every 4 hours.  Continue levothyroxine.     MUSCULOSKELETAL: psoriasis topical steroid      GMF

## 2023-01-14 NOTE — CHART NOTE - NSCHARTNOTEFT_GEN_A_CORE
CCU Transfer Note    Transfer from: CCU  Transfer to:  (  ) Medicine    (  ) Telemetry    (  ) RCU    (  ) Palliative    (  ) Stroke Unit    (  ) _______________  Accepting physician:    MEDICATIONS:  STANDING MEDICATIONS  aspirin enteric coated 81 milliGRAM(s) Oral daily  atorvastatin 80 milliGRAM(s) Oral at bedtime  bacitracin   Ointment 1 Application(s) Topical two times a day  cefTRIAXone   IVPB 2000 milliGRAM(s) IV Intermittent every 24 hours  chlorhexidine 2% Cloths 1 Application(s) Topical <User Schedule>  clotrimazole 1% Cream 1 Application(s) Topical two times a day  DULoxetine 30 milliGRAM(s) Oral <User Schedule>  enoxaparin Injectable 40 milliGRAM(s) SubCutaneous every 24 hours  glucagon  Injectable 1 milliGRAM(s) IntraMuscular once  insulin glargine Injectable (LANTUS) 20 Unit(s) SubCutaneous at bedtime  insulin lispro (ADMELOG) corrective regimen sliding scale   SubCutaneous three times a day before meals  insulin lispro Injectable (ADMELOG) 6 Unit(s) SubCutaneous three times a day before meals  latanoprost 0.005% Ophthalmic Solution 1 Drop(s) Both EYES at bedtime  levothyroxine 25 MICROGram(s) Oral daily  metoprolol succinate ER 25 milliGRAM(s) Oral daily  multivitamin 1 Tablet(s) Oral daily  naloxone Injectable 1 milliGRAM(s) IV Push once  pantoprazole    Tablet 40 milliGRAM(s) Oral before breakfast  prasugrel 10 milliGRAM(s) Oral daily  vancomycin  IVPB 1000 milliGRAM(s) IV Intermittent every 24 hours    PRN MEDICATIONS  oxycodone    5 mG/acetaminophen 325 mG 1 Tablet(s) Oral every 6 hours PRN      VITAL SIGNS: Last 24 Hours  T(C): 36.3 (14 Jan 2023 08:00), Max: 36.8 (13 Jan 2023 23:00)  T(F): 97.4 (14 Jan 2023 08:00), Max: 98.3 (13 Jan 2023 23:00)  HR: 91 (14 Jan 2023 11:00) (71 - 96)  BP: 123/60 (14 Jan 2023 11:00) (115/85 - 136/68)  BP(mean): 86 (14 Jan 2023 11:00) (86 - 102)  RR: 25 (14 Jan 2023 11:00) (18 - 42)  SpO2: 97% (14 Jan 2023 11:00) (91% - 99%)    LABS:                        11.2   9.28  )-----------( 347      ( 14 Jan 2023 05:10 )             39.1     01-14    133<L>  |  98  |  29<H>  ----------------------------<  116<H>  4.9   |  26  |  1.0    Ca    8.8      14 Jan 2023 05:10  Phos  3.0     01-14  Mg     1.9     01-14    TPro  6.5  /  Alb  2.9<L>  /  TBili  1.1  /  DBili  x   /  AST  34  /  ALT  19  /  AlkPhos  122<H>  01-14        67M PMH: HTN, HLD, CAD/MI s/p CABG/VERONICA, severe HF/ICM s/p AICD, Uncontrolled DM w peripheral neuropathy, R AKA, MRSA bacteremia, ?Psoriasis (on topical steroids), L ankle surgery with fixation hardware.   Presented 1/10 for worsening erythematous rash with draining lesions, swelling and tenderness over bilateral hand, and wrist and LLE x1week.     Of note, patieint had a R TKA several years ago c/b recurrent PJI and multiple revisions in 09/2020 by Dr. Castro (explant and abx spacer exchange), and most recently transferred to [facility] on 1/6/22  from [facility] for MRSA bacteremia due to recurrent PJI s/p Revision gastroc flap by PRS, antibiotic cement spacer removal, I&D, replacement on 1/6/22, and subsequent OR with vascular on 1/10/22 for right AKA and Closure of MOISES on 1/14/22. Patient also w  history of acute cholecystitis s/p perc sudhir 2/28/20 (drain removed in late May 2020) c/b by cholecystocutaneous excision of cholecystocutaneous fistulous tract with Dr. King in 04/08/2021 and eventual elective resection of fistula tract and partial cholecystectomy in 06/2022.     In ED, T 98.4, /86, HR 77, RR 17, SpO2 99; Labs significant for Hgb 11.8 w MCV 64.7 and increased RDW, Trop .06, BNP 9402 and VBG pCO2 65. Patient started on ABX and admitted for further evaluation. Cardiology evaluated pt 1/12 for elevated troponin. Likely demand ischemia and continued medical management including digoxin and metoprolol. Podiatry following for LE swelling. Lower Extremity Arterial Duplex B/L; Reviewed, Normal arterial blood flow Pt on vancomycin and cefepime for cellulitis - ID following. Pt noted to have cold LE. Vascular consulted to evaluate.    ICU Upgrade:  1/13: Rapid response called on patient for unresponsiveness. Finger stick found to be 31. Pt given D50 and started on D10. Despite treatment repeat fingerstick was 39. Glucagon and D50 again given. Repeat fingerstick was 96; patient having continued altered mental status.     CCU course:    No acute events overnight. Patient is alert and oriented x3 this morning. Diet was advanced today (1/14) which he tolerated well. FS normalized with readings ranging in the mid to upper 100's. Unclear etiology of his refractory hypoglycemia. Wife is bedside and states that the patient had low appetite yesterday and barely ate his meals. Hypoglycemia could be related to the reduced oral intake. Patient was receiving his insuline home dose under a constant carbohydrate diet at the hospital. It is plausible that the patient's usual diet includes a larger amount of carbohydrates and his home insulin regimen is excessive at this time. Lantus lowered from 60 units to 30, and sliding scare reduced to 6 units. Increase as needed. Patient got a  2mg dose of IV morphine to help with pain secondary to his cellulitis     - Digoxin level 2.5  - rEEG and CTH (-)  - Lactate 1.5  - Blood Cx and Fungal Cx pending.     Follow up:  - Monitor FS q4h if allowed in the medical floor, and increase insulin as needed.   - Follow up nutritional recs

## 2023-01-14 NOTE — PROGRESS NOTE ADULT - SUBJECTIVE AND OBJECTIVE BOX
24H events:    Patient is a 67y old Male who presents with a chief complaint of recurrent cellulitis (13 Jan 2023 15:23)    Primary diagnosis of Cellulitis       Today is hospital day 3d. This morning patient was seen and examined at bedside, resting comfortably in bed.      PAST MEDICAL & SURGICAL HISTORY  Status post percutaneous transluminal coronary angioplasty  2 stents    Atherosclerosis of coronary artery  CAD (coronary artery disease)    Osteoarthritis    HLD (hyperlipidemia)    Diabetes  on insulin pump    CHF (congestive heart failure)  EF ~ 25%    History of celiac disease    Diverticulitis    STEMI (ST elevation myocardial infarction)    Diabetic neuropathy  Hands and Feet    Anxiety and depression    Other postprocedural status  Fixation hardware in foot LEFT    Stented coronary artery  10/18 heart attack  INFERIOR WALL MI    S/P CABG x 1  2018    S/P TKR (total knee replacement), right  with infection Mrsa   per pt he was cleared from MRSA infection    Surgery, elective  right knee wound debridement    History of open reduction and internal fixation (ORIF) procedure  right hip    H/O shoulder surgery  right    AICD (automatic cardioverter/defibrillator) present  St Kali    Cholecystostomy care  drain inserted 2020 &amp; removed 4 months ago    History of tonsillectomy    History of hip replacement, total, right    Elective surgery  plastic surgery Left shin      SOCIAL HISTORY:  Social History:  Ex-smoker quit 30 years ago. No alcohol use. No illicit drug use. Wheel-chair bound at baseline, pt's wife takes care of him, transfer himself in and out of wheelchair. Pt does not have orthotic yet. (11 Jan 2023 03:35)      ALLERGIES:  ACE inhibitors (Rash)  Entresto (Swelling)    MEDICATIONS:  STANDING MEDICATIONS  aspirin enteric coated 81 milliGRAM(s) Oral daily  atorvastatin 80 milliGRAM(s) Oral at bedtime  bacitracin   Ointment 1 Application(s) Topical two times a day  cefTRIAXone   IVPB 2000 milliGRAM(s) IV Intermittent every 24 hours  chlorhexidine 2% Cloths 1 Application(s) Topical <User Schedule>  clotrimazole 1% Cream 1 Application(s) Topical two times a day  dextrose 10%. 1000 milliLiter(s) IV Continuous <Continuous>  dextrose 5%. 1000 milliLiter(s) IV Continuous <Continuous>  dextrose 5%. 1000 milliLiter(s) IV Continuous <Continuous>  dextrose 50% Injectable 25 Gram(s) IV Push once  dextrose 50% Injectable 12.5 Gram(s) IV Push once  dextrose 50% Injectable 25 Gram(s) IV Push once  dextrose 50% Injectable 50 milliLiter(s) IV Push every 15 minutes  DULoxetine 30 milliGRAM(s) Oral <User Schedule>  enoxaparin Injectable 40 milliGRAM(s) SubCutaneous every 24 hours  glucagon  Injectable 1 milliGRAM(s) IntraMuscular once  latanoprost 0.005% Ophthalmic Solution 1 Drop(s) Both EYES at bedtime  levothyroxine 25 MICROGram(s) Oral daily  metoprolol succinate ER 25 milliGRAM(s) Oral daily  multivitamin 1 Tablet(s) Oral daily  naloxone Injectable 1 milliGRAM(s) IV Push once  pantoprazole    Tablet 40 milliGRAM(s) Oral before breakfast  prasugrel 10 milliGRAM(s) Oral daily  vancomycin  IVPB 1000 milliGRAM(s) IV Intermittent every 24 hours    PRN MEDICATIONS  dextrose Oral Gel 15 Gram(s) Oral once PRN  oxycodone    5 mG/acetaminophen 325 mG 1 Tablet(s) Oral every 6 hours PRN        LABS:                        11.2   10.53 )-----------( 366      ( 13 Jan 2023 06:23 )             40.1     01-13    138  |  97<L>  |  32<H>  ----------------------------<  215<H>  5.1<H>   |  28  |  1.2    Ca    9.3      13 Jan 2023 06:23  Phos  2.9     01-13  Mg     2.1     01-13    TPro  7.3  /  Alb  3.5  /  TBili  1.2  /  DBili  x   /  AST  24  /  ALT  20  /  AlkPhos  130<H>  01-13          Lactate, Blood: 1.2 mmol/L (01-13-23 @ 19:11)          RADIOLOGY:    VITALS:   T(F): 98.3  HR: 72  BP: 121/69  RR: 21  SpO2: 99%    PHYSICAL EXAM:    General: no acute distress, lying in bed  Head: normocephalic and atraumatic  Neck: supple  Heart: regular rate and rhythm, S1 and S2 normal, no murmurs, rubs or gallops noted on exam  Lungs: Symmetric chest expansion bilaterally, clear lungs bilaterally, no wheezes rhonchi or crackles heard  Abdomen: Bowel sounds present, non tender on light and deep palpation  Extremities: No edema, no clubbing or cyanosis notes, red skin pealing and rash on bilateral hands and left leg

## 2023-01-14 NOTE — PROGRESS NOTE ADULT - ASSESSMENT
68 yo M w HTN, HLD, CAD/MI s/p CABG/VERONICA, severe HF/ICM s/p AICD, Uncontrolled DM w peripheral neuropathy, R AKA, MRSA bacteremia, Psoriasis (on topical steroids), L ankle surgery with fixation hardware presents for recurrent cellulitis. Patient started on Abx and admitted for further evaluation    # Recurrent LLE Cellulitis in setting of mrsa bacteremia and numerous joint infections in past - not septic on admission  # h/o b/l wrist Psoriasis (on topical steroids per dermatologist) - both wrapped  - LLE erythematous with ulcers/abrasions/bullae  - c/w IV vanc and cefepime, monitor vanc trough  - follow up blood culture and MRSA nares  - pain control prn  - Podiatry: x-ray left foot demonstrated diffuse osteopenia without osseous abnormalities, arterial duplex negative  - ID: continue clotrimazole [id number]    # HTN / HLD  # H/o CAD/MI s/p CABG/VERONICA  # Severe HF/ICM s/p AICD, euvolemic on exam  - c/w home asa, statin, effient, toprol; takes lasix prn for swelling  - c/w outpatient follow up with Dr. Lopez  - Digoxin held, consider resuming    # Elevated troponin, stable  - no active chest pain, monitor for symptom    # Uncontrolled DM ( last A1c 12) formerly on insulin pump now on b/b  - apparently takes lantus 60u qAM and lispro 30 qAC  - f/u repeat A1c, monitor fs and adjust accordingly  - may need endocrine c/s    # Hypothyroidism  - c/w home synthroid    # GERD  - c/w home PPI    # Depression/Anxiety  - c/w home duloxetine 30mg TID    # Restless leg syndrome  - on valium 2mg bid prn at home    # Microcytic Anemia  - f/u iron panel and monitor cbc    # H/o Diverticulitis and Celiac Disease  - Nutrition f/u and vitamin replacement prn    DVT ppx: on lovenox subcut  GI ppx: ppi  Code status: full code    66 yo M w HTN, HLD, CAD/MI s/p CABG/VERONICA, severe HF/ICM s/p AICD, Uncontrolled DM w peripheral neuropathy, R AKA, MRSA bacteremia, Psoriasis (on topical steroids), L ankle surgery with fixation hardware presents for recurrent cellulitis. Patient started on Abx and admitted for further evaluation    # Uncontrolled DM ( last A1c 12) formerly on insulin pump now on b/b  - apparently takes lantus 60u qAM and lispro 30 qAC  - Took 60 units of lantus, patient became hypoglycemic and was upgraded to CCU  - Currently on D10 and getting D50 pushes as needed  - On fingerstick Q1    # Recurrent LLE Cellulitis in setting of mrsa bacteremia and numerous joint infections in past - not septic on admission  # h/o b/l wrist Psoriasis (on topical steroids per dermatologist) - both wrapped  - LLE erythematous with ulcers/abrasions/bullae  - c/w IV vanc and cefepime, monitor vanc trough  - follow up blood culture and MRSA nares  - pain control prn  - Podiatry: x-ray left foot demonstrated diffuse osteopenia without osseous abnormalities, arterial duplex negative  - ID: continue clotrimazole [id number]    # HTN / HLD  # H/o CAD/MI s/p CABG/VERONICA  # Severe HF/ICM s/p AICD, euvolemic on exam  - c/w home asa, statin, effient, toprol; takes lasix prn for swelling  - c/w outpatient follow up with Dr. Lopez  - Digoxin held, consider resuming    # Elevated troponin, stable  - no active chest pain, monitor for symptom    # Hypothyroidism  - c/w home synthroid    # GERD  - c/w home PPI    # Depression/Anxiety  - c/w home duloxetine 30mg TID    # Restless leg syndrome  - on valium 2mg bid prn at home    # Microcytic Anemia  - f/u iron panel and monitor cbc    # H/o Diverticulitis and Celiac Disease  - Nutrition f/u and vitamin replacement prn    DVT ppx: on lovenox subcut  GI ppx: ppi     66 yo M w HTN, HLD, CAD/MI s/p CABG/VERONICA, severe HF/ICM s/p AICD, Uncontrolled DM w peripheral neuropathy, R AKA, MRSA bacteremia, Psoriasis (on topical steroids), L ankle surgery with fixation hardware presents for recurrent cellulitis. Patient started on Abx and admitted for further evaluation    # Recurrent LLE Cellulitis in setting of mrsa bacteremia and numerous joint infections in past - not septic on admission  # h/o b/l wrist Psoriasis (on topical steroids per dermatologist) - both wrapped  - LLE erythematous with ulcers/abrasions/bullae  - c/w IV vanc and cefepime, monitor vanc trough  - follow up blood culture and MRSA nares  - pain control prn  - Podiatry: x-ray left foot demonstrated diffuse osteopenia without osseous abnormalities, arterial duplex negative  - ID: continue clotrimazole [id number]    # Uncontrolled DM ( last A1c 12) formerly on insulin pump now on b/b  - apparently takes lantus 60u qAM and lispro 30 qAC  - Took 60 units of lantus, patient became hypoglycemic and was upgraded to CCU  - Currently on D10 and getting D50 pushes as needed  - On fingerstick Q1    # HTN / HLD  # H/o CAD/MI s/p CABG/VERONICA  # Severe HF/ICM s/p AICD, euvolemic on exam  - c/w home asa, statin, effient, toprol; takes lasix prn for swelling  - c/w outpatient follow up with Dr. Lopez  - Digoxin held, consider resuming    # Elevated troponin, stable  - no active chest pain, monitor for symptom    # Hypothyroidism  - c/w home synthroid    # GERD  - c/w home PPI    # Depression/Anxiety  - c/w home duloxetine 30mg TID    # Restless leg syndrome  - on valium 2mg bid prn at home    # Microcytic Anemia  - f/u iron panel and monitor cbc    # H/o Diverticulitis and Celiac Disease  - Nutrition f/u and vitamin replacement prn    DVT ppx: on lovenox subcut  GI ppx: ppi

## 2023-01-14 NOTE — CONSULT NOTE ADULT - SUBJECTIVE AND OBJECTIVE BOX
Patient is a 67y old  Male who presents with a chief complaint of recurrent cellulitis (14 Jan 2023 02:37)      HPI:  67M PMH: HTN, HLD, CAD/MI s/p CABG/VERONICA, severe HF/ICM s/p AICD, Uncontrolled DM w peripheral neuropathy, R AKA, MRSA bacteremia, ?Psoriasis (on topical steroids), L ankle surgery with fixation hardware.   Presented 1/10 for worsening erythematous rash with draining lesions, swelling and tenderness over bilateral hand, and wrist and LLE x1week.     Of note, patieint had a R TKA several years ago c/b recurrent PJI and multiple revisions in 09/2020 by Dr. Castro (explant and abx spacer exchange), and most recently transferred to [facility] on 1/6/22  from [facility] for MRSA bacteremia due to recurrent PJI s/p Revision gastroc flap by PRS, antibiotic cement spacer removal, I&D, replacement on 1/6/22, and subsequent OR with vascular on 1/10/22 for right AKA and Closure of MOISES on 1/14/22. Patient also w  history of acute cholecystitis s/p perc sudhir 2/28/20 (drain removed in late May 2020) c/b by cholecystocutaneous excision of cholecystocutaneous fistulous tract with Dr. King in 04/08/2021 and eventual elective resection of fistula tract and partial cholecystectomy in 06/2022.     In ED, T 98.4, /86, HR 77, RR 17, SpO2 99; Labs significant for Hgb 11.8 w MCV 64.7 and increased RDW, Trop .06, BNP 9402 and VBG pCO2 65. Patient started on ABX and admitted for further evaluation. Cardiology evaluated pt 1/12 for elevated troponin. Likely demand ischemia and continued medical management including digoxin and metoprolol. Podiatry following for LE swelling. Lower Extremity Arterial Duplex B/L; Reviewed, Normal arterial blood flow Pt on vancomycin and cefepime for cellulitis - ID following. Pt noted to have cold LE. Vascular consulted to evaluate.    ICU Upgrade:  1/13: Rapid response called on patient for unresponsiveness. Finger stick found to be 31. Pt given D50 and started on D10. Despite treatment repeat fingerstick was 39. Glucagon and D50 again given. Repeat fingerstick was 96; patient having continued altered mental status.       PAST MEDICAL & SURGICAL HISTORY:  Status post percutaneous transluminal coronary angioplasty  2 stents      Atherosclerosis of coronary artery  CAD (coronary artery disease)      Osteoarthritis      HLD (hyperlipidemia)      Diabetes  on insulin pump      CHF (congestive heart failure)  EF ~ 25%      History of celiac disease      Diverticulitis      STEMI (ST elevation myocardial infarction)      Diabetic neuropathy  Hands and Feet      Anxiety and depression      Other postprocedural status  Fixation hardware in foot LEFT      Stented coronary artery  10/18 heart attack  INFERIOR WALL MI      S/P CABG x 1  2018      S/P TKR (total knee replacement), right  with infection Mrsa   per pt he was cleared from MRSA infection      Surgery, elective  right knee wound debridement      History of open reduction and internal fixation (ORIF) procedure  right hip      H/O shoulder surgery  right      AICD (automatic cardioverter/defibrillator) present  [facility]      Cholecystostomy care  drain inserted 2020 &amp; removed 4 months ago      History of tonsillectomy      History of hip replacement, total, right      Elective surgery  plastic surgery Left shin          SOCIAL HX:   Smoking                         ETOH                            Other    FAMILY HISTORY:  Family history of heart disease (Mother)    Family history of allergies  daughter    :  No known cardiovacular family hisotry     Review Of Systems:     All ROS are negative except per HPI       Allergies    ACE inhibitors (Rash)  Entresto (Swelling)    Intolerances          PHYSICAL EXAM    ICU Vital Signs Last 24 Hrs  T(C): 36.4 (14 Jan 2023 04:00), Max: 36.8 (13 Jan 2023 23:00)  T(F): 97.5 (14 Jan 2023 04:00), Max: 98.3 (13 Jan 2023 23:00)  HR: 86 (14 Jan 2023 06:10) (71 - 86)  BP: 126/82 (14 Jan 2023 06:10) (117/72 - 132/76)  BP(mean): 101 (14 Jan 2023 06:10) (88 - 102)  ABP: --  ABP(mean): --  RR: 23 (14 Jan 2023 06:10) (18 - 24)  SpO2: 99% (14 Jan 2023 08:05) (96% - 99%)    O2 Parameters below as of 14 Jan 2023 08:05  Patient On (Oxygen Delivery Method): nasal cannula  O2 Flow (L/min): 2        Constitutional: no acute distress, well nourished well developed  Neuro: moving all 4 limbs spontaneously, no facial droop or dysarthria  HEENT: NCAT, anicteric  Neck: no visible lymphadenopathy or goiter  Pulm: no respiratory distress. clear to auscultation bilaterally  Cardiac: extremities appear pink and well-perfused.  regular rhythm and rate, no murmur detected  Abdomen: non-distended  Extremities: no peripheral edema  Skin: no visible rashes        01-13-23 @ 07:01  -  01-14-23 @ 07:00  --------------------------------------------------------  IN:    dextrose 10%: 450 mL  Total IN: 450 mL    OUT:  Total OUT: 0 mL    Total NET: 450 mL          LABS:                          11.2   9.28  )-----------( 347      ( 14 Jan 2023 05:10 )             39.1                                               01-14    133<L>  |  98  |  29<H>  ----------------------------<  116<H>  4.9   |  26  |  1.0    Ca    8.8      14 Jan 2023 05:10  Phos  3.0     01-14  Mg     1.9     01-14    TPro  6.5  /  Alb  2.9<L>  /  TBili  1.1  /  DBili  x   /  AST  34  /  ALT  19  /  AlkPhos  122<H>  01-14                                                                                           LIVER FUNCTIONS - ( 14 Jan 2023 05:10 )  Alb: 2.9 g/dL / Pro: 6.5 g/dL / ALK PHOS: 122 U/L / ALT: 19 U/L / AST: 34 U/L / GGT: x                                                                                                                                       X-Rays reviewed                                                                                     ECHO    CXR interpreted by me     MEDICATIONS  (STANDING):  aspirin enteric coated 81 milliGRAM(s) Oral daily  atorvastatin 80 milliGRAM(s) Oral at bedtime  bacitracin   Ointment 1 Application(s) Topical two times a day  cefTRIAXone   IVPB 2000 milliGRAM(s) IV Intermittent every 24 hours  chlorhexidine 2% Cloths 1 Application(s) Topical <User Schedule>  clotrimazole 1% Cream 1 Application(s) Topical two times a day  dextrose 10%. 1000 milliLiter(s) (50 mL/Hr) IV Continuous <Continuous>  dextrose 5%. 1000 milliLiter(s) (100 mL/Hr) IV Continuous <Continuous>  dextrose 5%. 1000 milliLiter(s) (50 mL/Hr) IV Continuous <Continuous>  dextrose 50% Injectable 25 Gram(s) IV Push once  dextrose 50% Injectable 12.5 Gram(s) IV Push once  dextrose 50% Injectable 25 Gram(s) IV Push once  dextrose 50% Injectable 50 milliLiter(s) IV Push every 15 minutes  DULoxetine 30 milliGRAM(s) Oral <User Schedule>  enoxaparin Injectable 40 milliGRAM(s) SubCutaneous every 24 hours  glucagon  Injectable 1 milliGRAM(s) IntraMuscular once  latanoprost 0.005% Ophthalmic Solution 1 Drop(s) Both EYES at bedtime  levothyroxine 25 MICROGram(s) Oral daily  metoprolol succinate ER 25 milliGRAM(s) Oral daily  morphine  - Injectable 2 milliGRAM(s) IV Push once  multivitamin 1 Tablet(s) Oral daily  naloxone Injectable 1 milliGRAM(s) IV Push once  pantoprazole    Tablet 40 milliGRAM(s) Oral before breakfast  prasugrel 10 milliGRAM(s) Oral daily  vancomycin  IVPB 1000 milliGRAM(s) IV Intermittent every 24 hours    MEDICATIONS  (PRN):  dextrose Oral Gel 15 Gram(s) Oral once PRN Blood Glucose LESS THAN 70 milliGRAM(s)/deciliter  oxycodone    5 mG/acetaminophen 325 mG 1 Tablet(s) Oral every 6 hours PRN Severe Pain (7 - 10)

## 2023-01-14 NOTE — CONSULT NOTE ADULT - SUBJECTIVE AND OBJECTIVE BOX
NUTRITION SUPPORT TEAM  -  CONSULT NOTE     ADMISSION HPI:  66 yo M w HTN, HLD, CAD/MI s/p CABG/VERONICA, severe HF/ICM s/p AICD, Uncontrolled DM w peripheral neuropathy, R AKA, MRSA bacteremia, Psoriasis (on topical steroids), L ankle surgery with fixation hardware presents for worsening rash and tenderness over bilateral hand and left LE associated with redness and drainage over past week. Patient says the leg has been gradually worsening over the last few days. He is also experiencing b/l wrist swelling and pain.    Of note, patieint had a R TKA several years ago c/b recurrent PJI and multiple revisions  in 09/2020 by Dr. Castro (explant and abx spacer exchange), and most recently transferred to [facility] on 1/6/22  from [facility] for MRSA bacteremia due to recurrent PJI s/p Revision gastroc flap by PRS, antibiotic cement spacer removal, I&D, replacement on 1/6/22, and subsequent OR with vascular on 1/10/22 for right AKA and Closure of MOISES on 1/14/22. Patient also w  history of acute cholecystitis s/p perc sudhir 2/28/20 (drain removed in late May 2020) c/b by cholecystocutaneous excision of cholecystocutaneous fistulous tract with Dr. King in 04/08/2021 and eventual elective resection of fistula tract and partial cholecystectomy in 06/2022.     In ED, T 98.4, /86, HR 77, RR 17, SpO2 99; Labs significant for Hgb 11.8 w MCV 64.7 and increased RDW, Trop .06, BNP 9402 and VBG pCO2 65    Patient started on ABX and admitted for further evaluation. (11 Jan 2023 03:35)    NUTRITION SUPPORT NOTE:  pt with poor po intake, fluctuating mental status, progressive weakness, poor wound healing  pt seen yesterday 1/13 but he was unstable and lethargic - in the process of determining issues and plan of care  d/w resident, and d/w CCU resident this morning - pt markedly improved and alert - plan to trial po at lunchtime today    REVIEW OF SYSTEMS:  Negative except as noted above.     PAST MEDICAL/SURGICAL HISTORY:   Status post percutaneous transluminal coronary angioplasty 2 stents  Atherosclerosis of coronary artery  CAD (coronary artery disease)  Osteoarthritis  HLD (hyperlipidemia)  Diabetes on insulin pump  CHF (congestive heart failure) EF ~ 25%  History of celiac disease  Diverticulitis  STEMI (ST elevation myocardial infarction)  Diabetic neuropathy  Anxiety and depression  Other postprocedural status  Fixation hardware in foot LEFT  Stented coronary artery  10/18 heart attack  INFERIOR WALL MI  S/P CABG x 1  2018  S/P TKR (total knee replacement), right    with infection Mrsa   per pt he was cleared from MRSA infection  right knee wound debridement  History of open reduction and internal fixation (ORIF) procedure right hip  H/O shoulder surgery right  AICD (automatic cardioverter/defibrillator) present [facility]  Cholecystostomy care drain inserted 2020 &amp; removed 4 months ago  History of tonsillectomy  History of hip replacement, total, right  Elective surgery plastic surgery Left shin        ALLERGIES:  ACE inhibitors (Rash)  Entresto (Swelling)      VITALS:  T(F): 97.4 (01-14 @ 08:00), Max: 97.5 (01-14 @ 04:00)  HR: 91 (01-14 @ 11:00) (74 - 96)  BP: 123/60 (01-14 @ 11:00) (115/85 - 136/68)  RR: 25 (01-14 @ 11:00) (18 - 42)  SpO2: 97% (01-14 @ 11:00) (91% - 99%)    HEIGHT/WEIGHT/BMI:   Height (cm): 185.4 (01-13), 185.4 (06-22), 185.4 (06-15), 185.4 (01-18)  Weight (kg): 77.1 (01-10), 79 (06-22), 77.1 (06-15), 75 (01-18)  BMI (kg/m2): 22.4 (01-13), 22.4 (01-10), 23 (06-22), 22.4 (06-15)    I/Os:     01-13-23 @ 07:01  -  01-14-23 @ 07:00  --------------------------------------------------------  IN:    dextrose 10%: 450 mL  Total IN: 450 mL    OUT:  Total OUT: 0 mL    Total NET: 450 mL    PHYSICAL EXAM:   GENERAL: pt had been sluggish when seen yesterday - weak but awake, difficulty speaking  HEENT: Moist mucous membranes  ABDOMEN: Soft, Nontender, Nondistended  SKIN: warm and well perfused; No obvious rashes or lesions  red skin peeling and rash on bilateral hands and left leg  Right AKA. LLE cold, diminished dorsal pulse, cellulitis  + PICC placed 1/11    STANDING MEDICATIONS:   aspirin enteric coated 81 milliGRAM(s) Oral daily  atorvastatin 80 milliGRAM(s) Oral at bedtime  bacitracin   Ointment 1 Application(s) Topical two times a day  cefTRIAXone   IVPB 2000 milliGRAM(s) IV Intermittent every 24 hours  chlorhexidine 2% Cloths 1 Application(s) Topical <User Schedule>  clotrimazole 1% Cream 1 Application(s) Topical two times a day  DULoxetine 30 milliGRAM(s) Oral <User Schedule>  enoxaparin Injectable 40 milliGRAM(s) SubCutaneous every 24 hours  glucagon  Injectable 1 milliGRAM(s) IntraMuscular once  insulin glargine Injectable (LANTUS) 20 Unit(s) SubCutaneous at bedtime  insulin lispro (ADMELOG) corrective regimen sliding scale   SubCutaneous three times a day before meals  insulin lispro Injectable (ADMELOG) 6 Unit(s) SubCutaneous three times a day before meals  latanoprost 0.005% Ophthalmic Solution 1 Drop(s) Both EYES at bedtime  levothyroxine 25 MICROGram(s) Oral daily  metoprolol succinate ER 25 milliGRAM(s) Oral daily  multivitamin 1 Tablet(s) Oral daily  naloxone Injectable 1 milliGRAM(s) IV Push once  pantoprazole    Tablet 40 milliGRAM(s) Oral before breakfast  prasugrel 10 milliGRAM(s) Oral daily  vancomycin  IVPB 1000 milliGRAM(s) IV Intermittent every 24 hours      LABS:                         11.2   9.28  )-----------( 347      ( 14 Jan 2023 05:10 )             39.1     133<L>  |  98  |  29<H>  ----------------------------<  116<H>          (01-14-23 @ 05:10)  4.9   |  26  |  1.0    Ca    8.8          (01-14-23 @ 05:10)  Phos  3.0         (01-14-23 @ 05:10)  Mg     1.9         (01-14-23 @ 05:10)    TPro  6.5  /  Alb  2.9<L>  /  TBili  1.1  /  DBili  x   /  AST  34  /  ALT  19  /  AlkPhos  122<H>       01-14-23 @ 05:10    A1c: 9.8 % (01-12-23 @ 05:39)      Blood Glucose (Past 24 hours):  190 mg/dL (01-14 @ 11:55)  138 mg/dL (01-14 @ 08:07)  189 mg/dL (01-14 @ 06:45)  106 mg/dL (01-14 @ 06:10)  133 mg/dL (01-14 @ 05:06)  194 mg/dL (01-14 @ 04:12)  102 mg/dL (01-14 @ 03:33)  138 mg/dL (01-14 @ 02:23)  178 mg/dL (01-14 @ 01:28)  96 mg/dL (01-14 @ 00:59)  118 mg/dL (01-13 @ 23:56)  177 mg/dL (01-13 @ 22:55)  177 mg/dL (01-13 @ 22:42)  109 mg/dL (01-13 @ 22:20)  39 mg/dL (01-13 @ 21:52)  96 mg/dL (01-13 @ 19:40)  96 mg/dL (01-13 @ 17:25)  39 mg/dL (01-13 @ 16:47)  31 mg/dL (01-13 @ 14:39)      DIET:   Diet, DASH/TLC:   Sodium & Cholesterol Restricted  Consistent Carbohydrate No Snacks  1500mL Fluid Restriction ([id number]) (01-14-23 @ 08:27) [Active]    RADIOLOGY:   < from: CT Head No Cont (01.13.23 @ 15:17) >  The study is limited due to patient motion.    The ventricles and cortical sulci are grossly normal in size and   configuration.   There is no gross acute intracranial hemorrhage,   extra-axial fluid collection or midline shift.    The visualized paranasal sinuses and mastoids are clear.    < end of copied text >  < from: VA Duplex Low Ext Arterial, Ltd, Left (01.11.23 @ 07:53) >  INTERPRETATION:  Clinical History / Reason for exam: This patient is a 67   years old male for evaluation of left lower extremity arterial system.   Lower extremity arterial duplex ultrasound was performed to assess for   arterial occlusive disease.    Left common femoral, deep femoral, superficial femoral, popliteal,   anterior tibial, posterior tibial and peroneal arteries were visualized.    There is no evidence of significant arterial occlusive disease or   arterial occlusions in the left lower extremity.    < end of copied text >

## 2023-01-14 NOTE — CONSULT NOTE ADULT - ASSESSMENT
- poorly controlled DM  - episode of hypoglycemia yesterday resulting in transfer to CCU - now improved and more alert  - LLE cellulitis, h/o L ankle surgery with hardware  - recent MRSA bacteremia  - h/o celiac disease and divertivulitis    PLAN:  - trial po diet at lunch today  - cont carb consistent low Na diet  - add sugar free Prosource Gelatein or sugar free yogurt every afternoon  - as long as pt afebrile, add Luis po twice a day - need to document intake of it as one would a medication  - send zinc level - then start 220 mg ZnSO4 po once daily - adjust dose when level resulted  - add vit C 500 mg po once daily  - if pt can not take po diet, or if intake < 50% of meals, should insert small bore NG feeding tube and feed 360 ml Glucerna 1.2 (if unavailable due to product shortages, use Diabetisource, same volume) x 3 meal-patterned feeds/d with pre-feed glucose testing and insulin dosing as indicated. Then cont to Luis via NG 2/d  - will follow

## 2023-01-15 NOTE — CONSULT NOTE ADULT - SUBJECTIVE AND OBJECTIVE BOX
HPI:  66 yo M w HTN, HLD, CAD/MI s/p CABG/VERONICA, severe HF/ICM s/p AICD, Uncontrolled DM w peripheral neuropathy, R AKA, MRSA bacteremia, Psoriasis (on topical steroids), L ankle surgery with fixation hardware presents for worsening rash and tenderness over bilateral hand and left LE associated with redness and drainage over past week. Patient says the leg has been gradually worsening over the last few days. He is also experiencing b/l wrist swelling and pain.    Of note, patieint had a R TKA several years ago c/b recurrent PJI and multiple revisions  in 09/2020 by Dr. Castro (explant and abx spacer exchange), and most recently transferred to [facility] on 1/6/22  from [facility] for MRSA bacteremia due to recurrent PJI s/p Revision gastroc flap by PRS, antibiotic cement spacer removal, I&D, replacement on 1/6/22, and subsequent OR with vascular on 1/10/22 for right AKA and Closure of MOISES on 1/14/22. Patient also w  history of acute cholecystitis s/p perc sudhir 2/28/20 (drain removed in late May 2020) c/b by cholecystocutaneous excision of cholecystocutaneous fistulous tract with Dr. King in 04/08/2021 and eventual elective resection of fistula tract and partial cholecystectomy in 06/2022.     In ED, T 98.4, /86, HR 77, RR 17, SpO2 99; Labs significant for Hgb 11.8 w MCV 64.7 and increased RDW, Trop .06, BNP 9402 and VBG pCO2 65    Patient started on ABX and admitted for further evaluation. (11 Jan 2023 03:35)    Wife at bedside. Reports right AKA for PAD and infection 1 year ago. Was unable to get prosthesis secondary to medical complications - Choly and chronic cellulitis.      PAST MEDICAL & SURGICAL HISTORY:    Right AKA     Status post percutaneous transluminal coronary angioplasty  2 stents      Atherosclerosis of coronary artery  CAD (coronary artery disease)      Osteoarthritis      HLD (hyperlipidemia)      Diabetes  on insulin pump      CHF (congestive heart failure)  EF ~ 25%      History of celiac disease      Diverticulitis      STEMI (ST elevation myocardial infarction)      Diabetic neuropathy  Hands and Feet      Anxiety and depression      Other postprocedural status  Fixation hardware in foot LEFT      Stented coronary artery  10/18 heart attack  INFERIOR WALL MI      S/P CABG x 1  2018      S/P TKR (total knee replacement), right  with infection Mrsa   per pt he was cleared from MRSA infection      Surgery, elective  right knee wound debridement      History of open reduction and internal fixation (ORIF) procedure  right hip      H/O shoulder surgery  right      AICD (automatic cardioverter/defibrillator) present  [facility]      Cholecystostomy care  drain inserted 2020 &amp; removed 4 months ago      History of tonsillectomy      History of hip replacement, total, right      Elective surgery  plastic surgery Left shin          Hospital Course: Patient admitted for recurrent cellulitis of LLE and BUE.     TODAY'S SUBJECTIVE & REVIEW OF SYMPTOMS: Confused and lethargic. Poor historian. No complaints except pain in right AKA residual limb. Has been going for outpatient PT to work on LLE contractures and weakness     Constitutional WNL   Cardio WNL. Cardiomyopathy   Resp WNL   GI WNL  Heme WNL  Endo WNL  Skin WNL  MSK WNL. Impaired circulation LLE  Neuro WNL  Cognitive confused since admission  Psych WNL      MEDICATIONS  (STANDING):  aspirin enteric coated 81 milliGRAM(s) Oral daily  atorvastatin 80 milliGRAM(s) Oral at bedtime  bacitracin   Ointment 1 Application(s) Topical two times a day  cefTRIAXone   IVPB 2000 milliGRAM(s) IV Intermittent every 24 hours  chlorhexidine 2% Cloths 1 Application(s) Topical <User Schedule>  clotrimazole 1% Cream 1 Application(s) Topical two times a day  digoxin     Tablet 250 MICROGram(s) Oral daily  DULoxetine 30 milliGRAM(s) Oral <User Schedule>  enoxaparin Injectable 40 milliGRAM(s) SubCutaneous every 24 hours  glucagon  Injectable 1 milliGRAM(s) IntraMuscular once  insulin glargine Injectable (LANTUS) 20 Unit(s) SubCutaneous at bedtime  insulin lispro (ADMELOG) corrective regimen sliding scale   SubCutaneous three times a day before meals  insulin lispro Injectable (ADMELOG) 6 Unit(s) SubCutaneous three times a day before meals  latanoprost 0.005% Ophthalmic Solution 1 Drop(s) Both EYES at bedtime  levothyroxine 25 MICROGram(s) Oral daily  metoprolol succinate ER 25 milliGRAM(s) Oral daily  multivitamin 1 Tablet(s) Oral daily  naloxone Injectable 1 milliGRAM(s) IV Push once  pantoprazole    Tablet 40 milliGRAM(s) Oral before breakfast  prasugrel 10 milliGRAM(s) Oral daily  vancomycin  IVPB 1000 milliGRAM(s) IV Intermittent every 24 hours    MEDICATIONS  (PRN):  oxycodone    5 mG/acetaminophen 325 mG 1 Tablet(s) Oral every 8 hours PRN Moderate Pain (4 - 6)      FAMILY HISTORY:  Family history of heart disease (Mother)    Family history of allergies  daughter        Allergies    ACE inhibitors (Rash)  Entresto (Swelling)    Intolerances        SOCIAL HISTORY:    [  ] Etoh  [  ] Smoking  [  ] Substance abuse     Home Environment:  [  ] Home Alone  [ x ] Lives with Family  [  ] Home Health Aid    Dwelling:  [  ] Apartment  [ x ] Private House  [  ] Adult Home  [  ] Skilled Nursing Facility      [  ] Short Term  [  ] Long Term  [  ] Stairs       Elevator [  ]    FUNCTIONAL STATUS PTA: (Check all that apply)  Ambulation: [   ]Independent   [   ] Requires Assistance   [  ] Dependent     [ x ] Non-Ambulatory. Was reportedly able to do a pivot transfer bed/ WC/ commode independenly         Assistive Device: [  ] SA Cane  [  ]  Q Cane  [  ] Walker  [  ]  Wheelchair  ADL : [  ] Independent    [ x  ] Requires Assistance    [  ]  Dependent       Vital Signs Last 24 Hrs  T(C): 36.4 (15 Kris 2023 13:00), Max: 36.6 (14 Jan 2023 22:24)  T(F): 97.6 (15 Kris 2023 13:00), Max: 97.8 (14 Jan 2023 22:24)  HR: 90 (15 Kris 2023 13:00) (90 - 101)  BP: 132/80 (15 Kris 2023 13:00) (124/85 - 140/78)  BP(mean): --  RR: 20 (15 Kris 2023 13:00) (19 - 28)  SpO2: 97% (15 Kris 2023 05:24) (92% - 97%)    Parameters below as of 15 Kris 2023 05:24  Patient On (Oxygen Delivery Method): room air          PHYSICAL EXAM: Alert & Oriented x 1. Delerium - falling asleep mid-sentence  GENERAL: NAD, well-groomed, well-developed  HEAD:  Atraumatic, Normocephalic  EYES: EOMI, PERRL  NECK: Supple  CHEST/LUNG: Clear to auscultation  HEART: Regular rate and rhythm  ABDOMEN: Soft, Nontender, Nondistended  EXTREMITIES:  Short right AKA well healed. Pain with movement. LLE with multiple abrasions with erythema. Both hand with multiple reddened abrasions  ROM:  [   ] WFL all extremities  [ x ] Abnormal - - 30 degrees left hip and knee extension    NERVOUS SYSTEM:   Cranial Nerves 2-12: [   ] intact  [  ] Abnormal   Motor Strength: [   ] WFL all extremities   [  ] Abnormal   Sensation: [   ] intact to light touch  [  ] Abnormal   Reflexes: [   ] Symmetric  [  ]  Abnormal     FUNCTIONAL STATUS:  Bed Mobility: [   ]Independent  [  ] Supervision  [  ] Needs Assistance   [  ] N/A   Transfers: [   ] Independent  [  ] Supervision  [  ] Needs Assistance  [  ]  N/A   Ambulation: [   ] Independent  [  ] Supervision  [  ] Needs Assistance  [  ] N/A   ADL: [   ] Independent  [  ] Requires Assistance  [  ] N/A       LABS:                        10.8   10.24 )-----------( 356      ( 15 Kris 2023 09:45 )             37.7     01-15    137  |  102  |  25<H>  ----------------------------<  159<H>  4.8   |  27  |  1.0    Ca    8.7      15 Kris 2023 09:45  Phos  2.4     01-15  Mg     1.9     01-15    TPro  6.4  /  Alb  3.1<L>  /  TBili  0.6  /  DBili  x   /  AST  35  /  ALT  25  /  AlkPhos  118<H>  01-15          RADIOLOGY & ADDITIONAL STUDIES:            Assesment:

## 2023-01-15 NOTE — PROGRESS NOTE ADULT - SUBJECTIVE AND OBJECTIVE BOX
Patient is a 67y old  Male who presents with a chief complaint of recurrent cellulitis (14 Jan 2023 12:59)        SUBJECTIVE: no acute events overnight, patient denies any pain     Vital Signs Last 24 Hrs  T(C): 36.4 (15 Kris 2023 06:17), Max: 36.6 (14 Jan 2023 22:24)  T(F): 97.5 (15 Kris 2023 06:17), Max: 97.8 (14 Jan 2023 22:24)  HR: 93 (15 Kris 2023 06:17) (93 - 101)  BP: 137/87 (15 Kris 2023 06:17) (112/87 - 140/78)  BP(mean): 100 (14 Jan 2023 15:00) (93 - 100)  RR: 19 (15 Kris 2023 06:17) (19 - 31)  SpO2: 97% (15 Kris 2023 05:24) (92% - 99%)    CONSTITUTIONAL:  NAD    ENT:   Airway patent,   No thrush    CARDIAC:   Normal rate,   regular rhythm.        RESPIRATORY:   N Wheezing  Normal chest expansion  Not tachypneic,  No use of accessory muscles    GASTROINTESTINAL:  Abdomen soft,   non-tender,   no guarding,   + BS    MUSCULOSKELETAL:   range of motion is limited      NEUROLOGICAL:   Alert     SKIN:   Skin normal color for race,   warm, dry   bilateral hand and LLE dressing intact       01-14-23 @ 07:01  -  01-15-23 @ 07:00  --------------------------------------------------------  IN:    dextrose 10%: 50 mL    Oral Fluid: 600 mL  Total IN: 650 mL    OUT:    Voided (mL): 820 mL  Total OUT: 820 mL    Total NET: -170 mL          LABS:                          10.8   10.24 )-----------( 356      ( 15 Kris 2023 09:45 )             37.7                                               01-15    137  |  102  |  25<H>  ----------------------------<  159<H>  4.8   |  27  |  1.0    Ca    8.7      15 Kris 2023 09:45  Phos  2.4     01-15  Mg     1.9     01-15    TPro  6.4  /  Alb  3.1<L>  /  TBili  0.6  /  DBili  x   /  AST  35  /  ALT  25  /  AlkPhos  118<H>  01-15                                                                                           LIVER FUNCTIONS - ( 15 Kris 2023 09:45 )  Alb: 3.1 g/dL / Pro: 6.4 g/dL / ALK PHOS: 118 U/L / ALT: 25 U/L / AST: 35 U/L / GGT: x                                                  Culture - Blood (collected 13 Jan 2023 19:11)  Source: .Blood Blood  Preliminary Report (14 Jan 2023 23:02):    No growth to date.                                                    MEDICATIONS  (STANDING):  aspirin enteric coated 81 milliGRAM(s) Oral daily  atorvastatin 80 milliGRAM(s) Oral at bedtime  bacitracin   Ointment 1 Application(s) Topical two times a day  cefTRIAXone   IVPB 2000 milliGRAM(s) IV Intermittent every 24 hours  chlorhexidine 2% Cloths 1 Application(s) Topical <User Schedule>  clotrimazole 1% Cream 1 Application(s) Topical two times a day  digoxin     Tablet 250 MICROGram(s) Oral daily  DULoxetine 30 milliGRAM(s) Oral <User Schedule>  enoxaparin Injectable 40 milliGRAM(s) SubCutaneous every 24 hours  glucagon  Injectable 1 milliGRAM(s) IntraMuscular once  insulin glargine Injectable (LANTUS) 20 Unit(s) SubCutaneous at bedtime  insulin lispro (ADMELOG) corrective regimen sliding scale   SubCutaneous three times a day before meals  insulin lispro Injectable (ADMELOG) 6 Unit(s) SubCutaneous three times a day before meals  latanoprost 0.005% Ophthalmic Solution 1 Drop(s) Both EYES at bedtime  levothyroxine 25 MICROGram(s) Oral daily  metoprolol succinate ER 25 milliGRAM(s) Oral daily  multivitamin 1 Tablet(s) Oral daily  naloxone Injectable 1 milliGRAM(s) IV Push once  pantoprazole    Tablet 40 milliGRAM(s) Oral before breakfast  prasugrel 10 milliGRAM(s) Oral daily  vancomycin  IVPB 1000 milliGRAM(s) IV Intermittent every 24 hours    MEDICATIONS  (PRN):  oxycodone    5 mG/acetaminophen 325 mG 1 Tablet(s) Oral every 6 hours PRN Severe Pain (7 - 10)

## 2023-01-15 NOTE — PROGRESS NOTE ADULT - ASSESSMENT
66 yo M presented for recurrent cellulitis.      IMPRESSION:    Refractory Iatrogenic Hypoglycemia, resolved   Uncontrolled DM ( last A1c 12) formerly on insulin pump now on basal bolus  Recurrent LLE Cellulitis in setting of MRSA bacteremia and numerous joint infections in past  Metabolic Encephalopathy secondary to hypoglycemia was improving however confused this morning  CAD SP CABG  Psoariasis  PAD SP RIght AKA   Severe HFrEF SP AICD  DM II  Hypertension  Restless Leg Syndrome  Depression/Anxiety   Hypothyroidism   History of Diverticulitis and Celiac Disease    PLAN:    Order one to one sit today  Last vancomycin trough 15.4 on 01/15   Check nasal MRSA  Continue Vancomycin and Ceftriaxone as per ID  Continue home asa, statin, digoxin, effient, toprol; takes lasix prn for swelling, outpatient follow up with Dr. Lopez  Continue basal bolus insulin, Endocrine evaluation consult placed   Continue home synthroid, PPI, duloxetine 30mg TID,   On valium 2mg bid prn at home for pain, on hold inpatient     Disposition: Endocrine evaluation, nasal MRSA, ID follow up for duration of antibiotics    Updated patient's wife, Raine Leon MD  Hospitalist Attending   66 yo M presented for recurrent cellulitis.      IMPRESSION:    Refractory Iatrogenic Hypoglycemia, resolved   Uncontrolled DM ( last A1c 12) formerly on insulin pump now on basal bolus  Recurrent LLE Cellulitis in setting of MRSA bacteremia and numerous joint infections in past  Metabolic Encephalopathy secondary to hypoglycemia was improving however confused this morning  CAD SP CABG  Psoariasis  PAD SP RIght AKA   Severe HFrEF SP AICD  DM II  Hypertension  Restless Leg Syndrome  Depression/Anxiety   Hypothyroidism   History of Diverticulitis and Celiac Disease    PLAN:    Order one to one sit today  Last vancomycin trough 15.4 on 01/15   Check nasal MRSA  Continue Vancomycin and Ceftriaxone as per ID  Continue home asa, statin, digoxin, effient, toprol; takes lasix prn for swelling, outpatient follow up with Dr. Lopez  Continue basal bolus insulin, Endocrine evaluation consult placed   Continue home synthroid, PPI, duloxetine 30mg TID,   On valium 2mg bid prn at home for pain, on hold inpatient, changed percocet from q6 to q8h, wife states he does have pain and requests for it not to be discontinued, pain management consulted   PT/OT    Disposition: Acute, Endocrine evaluation, nasal MRSA, ID follow up for duration of antibiotics    Updated patient's wife, Raine Leon MD  Hospitalist Attending

## 2023-01-15 NOTE — CONSULT NOTE ADULT - ASSESSMENT
IMPRESSION: Rehab of left AKA, RLE flexion contractures, now with recurrent cellulitis and delerium. Was reportedly WC independent with sit-pivot transfers but nonambulatory PTA. Limited rehab goals of regaining independence in transfers. Not prosthetic candidate.    PRECAUTIONS: [x  ] Cardiac  [  ] Respiratory  [  ] Seizures [  ] Contact Isolation  [  ] Droplet Isolation  [  ] Other    Weight Bearing Status: wbat    RECOMMENDATION:    Out of Bed to Chair     DVT/Decubiti Prophylaxis    REHAB PLAN:     [ x ] Bedside P/T 3-5 times a week   [ x  ]   Bedside O/T  2-3 times a week             [   ] No Rehab Therapy Indicated                   [   ]  Speech Therapy   Conditioning/ROM                                    ADL  Bed Mobility                                               Conditioning/ROM  Transfers                                                     Bed Mobility  Sitting /Standing Balance                         Transfers                                        Gait Training                                               Sitting/Standing Balance  Stair Training [   ]Applicable                    Home equipment Eval                                                                        Splinting  [   ] Only      GOALS:   ADL   [   ]   Independent                    Transfers  [x   ] Independent  WC                        Ambulation  [   ] Independent     [    ] With device                            [  xx ]  CG                                                         [   ]  CG                                                                  [   ] CG                            [    ] Min A                                                   [   ] Min A                                                              [   ] Min  A          DISCHARGE PLAN:   [   ]  Good candidate for Intensive Rehabilitation/Hospital based-[facility]                                             Will tolerate 3hrs Intensive Rehab Daily                                       [ xx   ]  Short Term Rehab in Skilled Nursing Facility                                       [    ]  Home with Outpatient or VN services                                         [    ]  Possible Candidate for Intensive Hospital based Rehab

## 2023-01-16 NOTE — CONSULT NOTE ADULT - ASSESSMENT
ASSESSMENT & PLAN  68 yo M w h/o HTN, HLD, CAD/MI s/p CABG/VERONICA, severe HF/ICM s/p AICD, Uncontrolled DM w peripheral neuropathy, R AKA, MRSA bacteremia, h/o Psoriasis, L ankle surgery with fixation hardware with recurrent cellulitis:    # Recurrent LLE Cellulitis in setting of past mrsa bacteremia and joint infections in past, now with ulcer on second toe:  - Continue IV vanc and cefepime/ ID recommendations;   - Tinea pedis: agree to continue using a topical anti-fungal like clotrimazole cream bid long term to decrease the chances of any tinea contribution to skin integrity in the context of recurrent cellulitis.   - pain control prn  Ulcer on the 2nd toe: mupirocin ointment bid and keep covered with telfa/ bandage to protect skin from any trauma; Consider consult with vascular surgeon and ulcer wound care/burn team;   Erosions and excoriations on the blt hands and LLE can also be treated with topical antibiotics such as mupirocin ointment bid.     # Patient with itchy rashes on the extremities, with history of blt. hands Psoriasis, with multiple erosions with hemorrhagic crust on blt hands and LLE:     - Psoriasis: while no active lesions are present to suggest psoriasis at this time, a topical steroid ointment like triamcinolone bid and antihistamines like loratidine or cetirizine can help with both itching and the lichenified dry patches on LLE and hands; moisturizers can be used for xerosis/ dry skin: patient should also use soaps and moisturizers for people with sensitive skin like Aveeno or Cetaphil after discharge.     - The few tense blisters with clear liquid on left lower extremity are more likely be secondary to possible Bullous Pemphigoid or diabetes, with unlikely contribution from psoriasis, but triamcinolone ointment bid can also be used on them when they are itchy. They are itchy and not tender as per patient. A biopsy would not be indicated at this time considering the mental status of the patient (patient was restless and kept repeating himself continuously with a loud voice, saying that he wanted to go home and didn’t want/ need any more hospital care / wife expressed concern about his mental status).     - Patient advised to stop scratching but said that it’s difficult for him not to scratch at times; it is advisable to continue keeping the itchy areas on dorsal hands and feet overed/ wrapped and clipping the sharp/ broken fingernails to minimize scratching and trauma to the skin;    # Continue care for other medical problems as per medical team, including for diabetes/ hypothyroidism/ restless leg syndrome.    Please reconsult if his condition gets worse and he should continue care with his outpatient dermatologist after discharge.    Note: Derm Consult entered Jan. 13th by Laura Banerjee and as per the dermatologist on call at the time Dr. Hector Marshall he was not called / made aware of the consult. I started covering Dermatology consults today and became aware of it from the Derm Consult List in [place].

## 2023-01-16 NOTE — CONSULT NOTE ADULT - SUBJECTIVE AND OBJECTIVE BOX
Neurology Consult    Patient is a 67y old  Male who presents with a chief complaint of recurrent cellulitis (16 Jan 2023 22:34) Patient was seen and examined at the bedside. The patient was referred to neurology for acute change in his mental status today. The patient is poor historian, and was not able to a proper history. History was obtained from the chart and medical staff. As per staff, he has a new onset AMS today.   The patient was referred to neurology for the same complain on 01/13, when he was found to have hypoglycemia (31), polypharmacy (including valium and oxycodone).     HPI:  66 yo M w HTN, HLD, CAD/MI s/p CABG/VERONICA, severe HF/ICM s/p AICD, Uncontrolled DM w peripheral neuropathy, R AKA, MRSA bacteremia, Psoriasis (on topical steroids), L ankle surgery with fixation hardware presents for worsening rash and tenderness over bilateral hand and left LE associated with redness and drainage over past week. Patient says the leg has been gradually worsening over the last few days. He is also experiencing b/l wrist swelling and pain.    Of note, patieint had a R TKA several years ago c/b recurrent PJI and multiple revisions  in 09/2020 by Dr. Castro (explant and abx spacer exchange), and most recently transferred to [facility] on 1/6/22  from [facility] for MRSA bacteremia due to recurrent PJI s/p Revision gastroc flap by PRS, antibiotic cement spacer removal, I&D, replacement on 1/6/22, and subsequent OR with vascular on 1/10/22 for right AKA and Closure of MOISES on 1/14/22. Patient also w  history of acute cholecystitis s/p perc sudhir 2/28/20 (drain removed in late May 2020) c/b by cholecystocutaneous excision of cholecystocutaneous fistulous tract with Dr. King in 04/08/2021 and eventual elective resection of fistula tract and partial cholecystectomy in 06/2022.     In ED, T 98.4, /86, HR 77, RR 17, SpO2 99; Labs significant for Hgb 11.8 w MCV 64.7 and increased RDW, Trop .06, BNP 9402 and VBG pCO2 65    Patient started on ABX and admitted for further evaluation. (11 Jan 2023 03:35)      PAST MEDICAL & SURGICAL HISTORY:  Status post percutaneous transluminal coronary angioplasty  2 stents      Atherosclerosis of coronary artery  CAD (coronary artery disease)      Osteoarthritis      HLD (hyperlipidemia)      Diabetes  on insulin pump      CHF (congestive heart failure)  EF ~ 25%      History of celiac disease      Diverticulitis      STEMI (ST elevation myocardial infarction)      Diabetic neuropathy  Hands and Feet      Anxiety and depression      Other postprocedural status  Fixation hardware in foot LEFT      Stented coronary artery  10/18 heart attack  INFERIOR WALL MI      S/P CABG x 1  2018      S/P TKR (total knee replacement), right  with infection Mrsa   per pt he was cleared from MRSA infection      Surgery, elective  right knee wound debridement      History of open reduction and internal fixation (ORIF) procedure  right hip      H/O shoulder surgery  right      AICD (automatic cardioverter/defibrillator) present  [facility]      Cholecystostomy care  drain inserted 2020 &amp; removed 4 months ago      History of tonsillectomy      History of hip replacement, total, right      Elective surgery  plastic surgery Left shin          FAMILY HISTORY:  Family history of heart disease (Mother)    Family history of allergies  daughter        Social History: (-) x 3    Allergies    ACE inhibitors (Rash)  Entresto (Swelling)    Intolerances        MEDICATIONS  (STANDING):  aspirin enteric coated 81 milliGRAM(s) Oral daily  atorvastatin 80 milliGRAM(s) Oral at bedtime  bacitracin   Ointment 1 Application(s) Topical two times a day  cefepime   IVPB 2000 milliGRAM(s) IV Intermittent every 8 hours  chlorhexidine 2% Cloths 1 Application(s) Topical <User Schedule>  clobetasol 0.05% Cream 1 Application(s) Topical two times a day  clotrimazole 1% Cream 1 Application(s) Topical two times a day  digoxin     Tablet 250 MICROGram(s) Oral daily  DULoxetine 30 milliGRAM(s) Oral <User Schedule>  enoxaparin Injectable 40 milliGRAM(s) SubCutaneous every 24 hours  glucagon  Injectable 1 milliGRAM(s) IntraMuscular once  insulin lispro (ADMELOG) corrective regimen sliding scale   SubCutaneous three times a day before meals  latanoprost 0.005% Ophthalmic Solution 1 Drop(s) Both EYES at bedtime  levothyroxine 25 MICROGram(s) Oral daily  metoprolol succinate ER 25 milliGRAM(s) Oral daily  multivitamin 1 Tablet(s) Oral daily  naloxone Injectable 1 milliGRAM(s) IV Push once  pantoprazole    Tablet 40 milliGRAM(s) Oral before breakfast  prasugrel 10 milliGRAM(s) Oral daily  vancomycin  IVPB 1000 milliGRAM(s) IV Intermittent every 24 hours    MEDICATIONS  (PRN):  oxycodone    5 mG/acetaminophen 325 mG 1 Tablet(s) Oral every 8 hours PRN Moderate Pain (4 - 6)      Review of systems:    Constitutional: as per HPI  Eyes: No eye pain or discharge  ENMT:  No difficulty hearing; No sinus or throat pain  Neck: No pain or stiffness  Respiratory: No cough, wheezing, chills or hemoptysis  Cardiovascular: No chest pain, palpitations, shortness of breath, dyspnea on exertion  Gastrointestinal: No abdominal pain, nausea, vomiting or hematemesis; No diarrhea or constipation.   Genitourinary: No dysuria, frequency, hematuria or incontinence  Neurological: As per HPI  Skin: No rashes or lesions   Endocrine: No heat or cold intolerance; No hair loss  Musculoskeletal: No joint pain or swelling  Psychiatric: No depression, anxiety, mood swings  Heme/Lymph: No easy bruising or bleeding gums    Vital Signs Last 24 Hrs  T(C): 36.2 (16 Jan 2023 14:51), Max: 36.2 (16 Jan 2023 05:15)  T(F): 97.1 (16 Jan 2023 14:51), Max: 97.1 (16 Jan 2023 05:15)  HR: 76 (16 Jan 2023 14:51) (76 - 95)  BP: 129/80 (16 Jan 2023 14:51) (129/80 - 130/78)  BP(mean): --  RR: 18 (16 Jan 2023 14:51) (18 - 18)  SpO2: 98% (16 Jan 2023 05:15) (98% - 98%)        Examination:  General:  Appearance is consistent with chronologic age.  No abnormal facies.  Gross skin survey showed significant skin rash, and abnormalities (followed by dermatology)    Cognitive/Language:  The patient is drowsy, oriented to person, but not place, time and date (he was able to tell his age correctly). Language with normal repetition, comprehension and naming.  Nondysarthric.    Eyes: intact VFF.  EOMI w/o nystagmus, skew or reported double vision.  PERRL.  No ptosis/weakness of eyelid closure.    Face:  Facial sensation normal V1 - 3, no facial asymmetry.    Ears/Nose/Throat:  Hearing grossly intact b/l.  Palate elevates midline.  Tongue and uvula midline.   Motor examination: Normal tone, decreased bulk and range of motion.  No tenderness, twitching, tremors or involuntary movements.  Formal Muscle Strength Testing: (MRC grade R/L) 5/5 UE; 5/5 LE.  No observable drift.  Reflexes: 1+ b/l biceps, triceps, brachioradialis, absent on Lt patella and Achilles (Rt AKA).  Plantar response downgoing b/l.   Sensory examination: sensory symmetric in both arms, decreased on the Lt foot up to the knee  Cerebellum: FTN intact.   Gait deferred         Labs:   CBC Full  -  ( 16 Jan 2023 07:55 )  WBC Count : 8.67 K/uL  RBC Count : 5.99 M/uL  Hemoglobin : 11.0 g/dL  Hematocrit : 38.6 %  Platelet Count - Automated : 353 K/uL  Mean Cell Volume : 64.4 fL  Mean Cell Hemoglobin : 18.4 pg  Mean Cell Hemoglobin Concentration : 28.5 g/dL  Auto Neutrophil # : 6.44 K/uL  Auto Lymphocyte # : 0.82 K/uL  Auto Monocyte # : 1.18 K/uL  Auto Eosinophil # : 0.14 K/uL  Auto Basophil # : 0.05 K/uL  Auto Neutrophil % : 74.2 %  Auto Lymphocyte % : 9.5 %  Auto Monocyte % : 13.6 %  Auto Eosinophil % : 1.6 %  Auto Basophil % : 0.6 %    01-16    137  |  101  |  27<H>  ----------------------------<  102<H>  5.1<H>   |  27  |  0.9    Ca    8.8      16 Jan 2023 07:55  Phos  2.7     01-16  Mg     1.9     01-16    TPro  6.8  /  Alb  3.3<L>  /  TBili  0.7  /  DBili  x   /  AST  41  /  ALT  27  /  AlkPhos  135<H>  01-16    LIVER FUNCTIONS - ( 16 Jan 2023 07:55 )  Alb: 3.3 g/dL / Pro: 6.8 g/dL / ALK PHOS: 135 U/L / ALT: 27 U/L / AST: 41 U/L / GGT: x                   [facility]  [facility]     01-16-23 @ 23:16

## 2023-01-16 NOTE — CONSULT NOTE ADULT - ASSESSMENT
#poorly controlled insulin dependant diabetes  on MDI at home   - hypoglycemia in the hospital , poor po intake   - did not receive lantus last night, keep monitoring FS and restart Lantus once FS persistently > 180 , lantus 15 units   - once eating better can start admelog 4 units TIDPC , hold if npo or not eating more then 50% of meal   - ISS 2: 50 > 150 TIDAC   - cellulitis treatment per primary team     # hypothyroidism   - TSH 5.61 on levothyroxine 25 mcg daily   - check ft4 , continue for now   discussed with wife at bedside

## 2023-01-16 NOTE — CONSULT NOTE ADULT - ATTENDING COMMENTS
Agree with above note   Patient will be followed while in patient   Discussed with patient and resident
Recommendations as above.

## 2023-01-16 NOTE — CONSULT NOTE ADULT - SUBJECTIVE AND OBJECTIVE BOX
Dermatology was consulted for itchy rash on "blt hands and wrist" of patient with possible h/o psoriasis and admitted for recurrent cellulitis .   Patient today c/o itchy rashes worst on the arms and left LE. As per wife the patient was seeing outpatient providers  who gave him topical steroids for the itchy rashes/ "psoriasis" on extremities with limited benefit. In the presence of his wife the patient was restless and repeated himself at times, but admitted to scratching at times his itchy rashes with his sharp and broken nails,  producing linear excoriations including on his L leg (R is amputated with no lesions). ~ 3 itchy, non-tender tense intact blisters ~ 5 mm in size were noticed on the L lower shin/ ankle and few erosions from possible blister remnants were also noticed, as well as lichenified xerotic patch with excoriations on the L medical calf, suggesting prolonged scratching. None of the skin was warm or tender to touch. The patient has been treated with IV Vanco and Cefepime for recurrent cellulitis and is being followed by ID. His tinea pedis is being treated with clotrimazole cream. When asked about the ulcerated 2nd toe, the wife said that it’s been getting worse x past few days because the patient kept traumatizing it against the bed and reporting that the patient has "restless legs syndrome". The toe/ foot was bandaged before examination to minimize trauma. Patient denied chills and joint pains.    Laura Banerjee stated in the Derm Consult Request that the patient "has been seen as outpt for rash b/l wrists and hands, dx as "psoriasis" unimproved w topical steroids. I saw this pt 1/11 and 1/12, initially had erythema w/o overlying scale or scabbing, no bullae/ papules/ vesicles, not involving palms. Color ranged from deep pink/red to violaceous. on 1/12 had developed scabbing over central areas of the same rash. Significant joint involvement, some deep fissures/cracking to knuckles. Overall seems potentially vascular in origin, potentially rheumatologic. Please reach out for photos."  PMHx:  68 yo M w HTN, HLD, CAD/MI s/p CABG/VERONICA, severe HF/ICM s/p AICD, Uncontrolled DM w peripheral neuropathy, R AKA, MRSA bacteremia, Psoriasis, L ankle surgery with fixation hardware.     Noted note from Resident transfer note 1/14 & Endocrine note: "Patient had a R TKA several years ago c/b recurrent PJI and multiple revisions  in 09/2020 by Dr. Castro (explant and abx spacer exchange), and most recently transferred to [facility] on 1/6/22  from [facility] for MRSA bacteremia due to recurrent PJI s/p Revision gastroc flap by PRS, antibiotic cement spacer removal, I&D, replacement on 1/6/22, and subsequent OR with vascular on 1/10/22 for right AKA and Closure of MOISES on 1/14/22. Patient also w  history of acute cholecystitis s/p perc sudhir 2/28/20 (drain removed in late May 2020) c/b by cholecystocutaneous excision of cholecystocutaneous fistulous tract with Dr. King in 04/08/2021 and eventual elective resection of fistula tract and partial cholecystectomy in 06/2022."    ALLERGIES:  ACE inhibitors (Rash)  Entresto (Swelling)    MEDICATIONS:  STANDING MEDICATIONS  aspirin enteric coated 81 milliGRAM(s) Oral daily  atorvastatin 80 milliGRAM(s) Oral at bedtime  bacitracin   Ointment 1 Application(s) Topical two times a day  cefepime   IVPB 2000 milliGRAM(s) IV Intermittent every 8 hours  chlorhexidine 2% Cloths 1 Application(s) Topical <User Schedule>  clotrimazole 1% Cream 1 Application(s) Topical two times a day  digoxin     Tablet 250 MICROGram(s) Oral daily  DULoxetine 30 milliGRAM(s) Oral <User Schedule>  enoxaparin Injectable 40 milliGRAM(s) SubCutaneous every 24 hours  glucagon  Injectable 1 milliGRAM(s) IntraMuscular once  insulin glargine Injectable (LANTUS) 20 Unit(s) SubCutaneous at bedtime  insulin lispro (ADMELOG) corrective regimen sliding scale   SubCutaneous three times a day before meals  insulin lispro Injectable (ADMELOG) 6 Unit(s) SubCutaneous three times a day before meals  latanoprost 0.005% Ophthalmic Solution 1 Drop(s) Both EYES at bedtime  levothyroxine 25 MICROGram(s) Oral daily  metoprolol succinate ER 25 milliGRAM(s) Oral daily  multivitamin 1 Tablet(s) Oral daily  naloxone Injectable 1 milliGRAM(s) IV Push once  pantoprazole    Tablet 40 milliGRAM(s) Oral before breakfast  prasugrel 10 milliGRAM(s) Oral daily  vancomycin  IVPB 1000 milliGRAM(s) IV Intermittent every 24 hours    PRN MEDICATIONS  oxycodone    5 mG/acetaminophen 325 mG 1 Tablet(s) Oral every 8 hours PRN      VITAL SIGNS: Last 24 Hours  T(C): 36.2 (16 Jan 2023 05:15), Max: 36.9 (15 Kris 2023 20:06)  T(F): 97.1 (16 Jan 2023 05:15), Max: 98.4 (15 Kris 2023 20:06)  HR: 95 (16 Jan 2023 05:15) (88 - 95)  BP: 130/78 (16 Jan 2023 05:15) (130/78 - 140/74)  BP(mean): --  RR: 18 (16 Jan 2023 05:15) (18 - 20)  SpO2: 98% (16 Jan 2023 05:15) (98% - 98%)    LABS: TSH elevated 5.21 on 1/14/23, wbc not elevated                        11.0   8.67  )-----------( 353      ( 16 Jan 2023 07:55 )             38.6     01-16    137  |  101  |  27<H>  ----------------------------<  102<H>  5.1<H>   |  27  |  0.9    Ca    8.8      16 Jan 2023 07:55  Phos  2.7     01-16  Mg     1.9     01-16    TPro  6.8  /  Alb  3.3<L>  /  TBili  0.7  /  DBili  x   /  AST  41  /  ALT  27  /  AlkPhos  135<H>  01-16          Blood culture/Preliminary Report (16 Jan 2023 12:24):    Testing in progress/ pending    PE (this morning, wife present at times): Alert/ Patient restless, kept repeating himself at times, but answered questions and allowed examination most of the times:     - eryth. Xerotic patches and erosions with hemorrhagic crust/ no pus on dorsal hands blt (after removal of bandages) and on his LLE with some linear excoriations and erosions, no pus / pt has sharp and broken fingernails.  - no oral/ mucosal lesions;   - few tense non-tender intact itchy blisters ~ 5 mm in size were noticed on the L lower shin/ ankle, no pus, not warm to touch, as well as lichenified xerotic patch on L medial calf, suggesting that the patient has been scratching the area for prolonged period of times.  - back and amputated R leg with no concerning lesions.  - Scaly patches L foot and ulcer distal 2nd toe with serosanguinous discharge on the protective bandage and after removal of the protective bandage, no pus, no surrounding erythema, toe not warm.

## 2023-01-16 NOTE — CONSULT NOTE ADULT - SUBJECTIVE AND OBJECTIVE BOX
HPI:  66 yo M w HTN, HLD, CAD/MI s/p CABG/VERONICA, severe HF/ICM s/p AICD, Uncontrolled DM w peripheral neuropathy, R AKA, MRSA bacteremia, Psoriasis (on topical steroids), L ankle surgery with fixation hardware presents for worsening rash and tenderness over bilateral hand and left LE associated with redness and drainage over past week. Patient says the leg has been gradually worsening over the last few days. He is also experiencing b/l wrist swelling and pain.    Of note, patieint had a R TKA several years ago c/b recurrent PJI and multiple revisions  in 09/2020 by Dr. Castro (explant and abx spacer exchange), and most recently transferred to [facility] on 1/6/22  from [facility] for MRSA bacteremia due to recurrent PJI s/p Revision gastroc flap by PRS, antibiotic cement spacer removal, I&D, replacement on 1/6/22, and subsequent OR with vascular on 1/10/22 for right AKA and Closure of MOISES on 1/14/22. Patient also w  history of acute cholecystitis s/p perc sudhir 2/28/20 (drain removed in late May 2020) c/b by cholecystocutaneous excision of cholecystocutaneous fistulous tract with Dr. King in 04/08/2021 and eventual elective resection of fistula tract and partial cholecystectomy in 06/2022.     In ED, T 98.4, /86, HR 77, RR 17, SpO2 99; Labs significant for Hgb 11.8 w MCV 64.7 and increased RDW, Trop .06, BNP 9402 and VBG pCO2 65    Patient started on ABX and admitted for further evaluation. (11 Jan 2023 03:35)      Endocrine HPI :  patient known to me from outpatient settings , chronic comorbidities , right AKA, insulin dependant DM , PVD , previously on insulin pump but most recently was managed with MDI per patient and wife preference .   before admission was on Lantus 60 units and novolog 30 units TID AC with frequent hyper/hypoglycemia secondary to change in diet   patient also with hypothyroidism on Lt4 25 mcg daily     PAST MEDICAL & SURGICAL HISTORY  Status post percutaneous transluminal coronary angioplasty  2 stents    Atherosclerosis of coronary artery  CAD (coronary artery disease)    Osteoarthritis    HLD (hyperlipidemia)    Diabetes      CHF (congestive heart failure)  EF ~ 25%    History of celiac disease    Diverticulitis    STEMI (ST elevation myocardial infarction)    Diabetic neuropathy  Hands and Feet    Anxiety and depression    Other postprocedural status  Fixation hardware in foot LEFT    Stented coronary artery  10/18 heart attack  INFERIOR WALL MI    S/P CABG x 1  2018    S/P TKR (total knee replacement), right  with infection Mrsa   per pt he was cleared from MRSA infection    Surgery, elective  right knee wound debridement    History of open reduction and internal fixation (ORIF) procedure  right hip    H/O shoulder surgery  right    AICD (automatic cardioverter/defibrillator) present  [facility]    Cholecystostomy care  drain inserted 2020 &amp; removed 4 months ago    History of tonsillectomy    History of hip replacement, total, right    Elective surgery  plastic surgery Left shin        FAMILY HISTORY:  FAMILY HISTORY:  Family history of heart disease (Mother)    Family history of allergies  daughter        SOCIAL HISTORY:  []smoker  []Alcohol  []Drug    ALLERGIES:  ACE inhibitors (Rash)  Entresto (Swelling)      MEDICATIONS:  MEDICATIONS  (STANDING):  aspirin enteric coated 81 milliGRAM(s) Oral daily  atorvastatin 80 milliGRAM(s) Oral at bedtime  bacitracin   Ointment 1 Application(s) Topical two times a day  cefepime   IVPB 2000 milliGRAM(s) IV Intermittent every 8 hours  chlorhexidine 2% Cloths 1 Application(s) Topical <User Schedule>  clotrimazole 1% Cream 1 Application(s) Topical two times a day  digoxin     Tablet 250 MICROGram(s) Oral daily  DULoxetine 30 milliGRAM(s) Oral <User Schedule>  enoxaparin Injectable 40 milliGRAM(s) SubCutaneous every 24 hours  glucagon  Injectable 1 milliGRAM(s) IntraMuscular once  insulin glargine Injectable (LANTUS) 20 Unit(s) SubCutaneous at bedtime  insulin lispro (ADMELOG) corrective regimen sliding scale   SubCutaneous three times a day before meals  insulin lispro Injectable (ADMELOG) 6 Unit(s) SubCutaneous three times a day before meals  latanoprost 0.005% Ophthalmic Solution 1 Drop(s) Both EYES at bedtime  levothyroxine 25 MICROGram(s) Oral daily  metoprolol succinate ER 25 milliGRAM(s) Oral daily  multivitamin 1 Tablet(s) Oral daily  naloxone Injectable 1 milliGRAM(s) IV Push once  pantoprazole    Tablet 40 milliGRAM(s) Oral before breakfast  prasugrel 10 milliGRAM(s) Oral daily  vancomycin  IVPB 1000 milliGRAM(s) IV Intermittent every 24 hours    MEDICATIONS  (PRN):  oxycodone    5 mG/acetaminophen 325 mG 1 Tablet(s) Oral every 8 hours PRN Moderate Pain (4 - 6)      HOME MEDICATIONS:  Home Medications:  aspirin 81 mg oral delayed release tablet: 1 tab(s) orally once a day (22 Jun 2022 11:04)  diazePAM 2 mg oral tablet: 1 tab(s) orally 2 times a day, As Needed (11 Jan 2023 05:51)  digoxin 250 mcg (0.25 mg) oral tablet: 1 tab(s) orally once a day (11 Jan 2023 05:38)  DULoxetine 30 mg oral delayed release capsule: 1 cap(s) orally 3 times a day (11 Jan 2023 05:49)  HumaLOG 100 units/mL injectable solution: 30 unit(s) injectable 3 times a day (with meals) (11 Jan 2023 05:53)  Lantus 100 units/mL subcutaneous solution: 60 unit(s) subcutaneous once a day (11 Jan 2023 05:52)  Lasix 40 mg oral tablet: 1 tab(s) orally once a day, As Needed (22 Jun 2022 11:04)  levothyroxine 25 mcg (0.025 mg) oral tablet: 1 tab(s) orally once a day (22 Jun 2022 11:04)  metoprolol succinate 25 mg oral tablet, extended release: 1 tab(s) orally once a day (22 Jun 2022 11:04)  Multiple Vitamins oral tablet: 1 tab(s) orally once a day (22 Jun 2022 11:04)  pantoprazole 40 mg oral delayed release tablet: 1 tab(s) orally once a day (before a meal) (22 Jun 2022 11:04)  prasugrel 10 mg oral tablet: 1 tab(s) orally once a day (22 Jun 2022 11:04)  rosuvastatin 40 mg oral tablet: 1 tab(s) orally once a day (22 Jun 2022 11:04)      VITALS:   T(F): 97.1 (01-16 @ 05:15), Max: 98.4 (01-15 @ 20:06)  HR: 95 (01-16 @ 05:15) (71 - 101)  BP: 130/78 (01-16 @ 05:15) (112/87 - 140/78)  BP(mean): 100 (01-14 @ 15:00) (86 - 102)  RR: 18 (01-16 @ 05:15) (18 - 42)  SpO2: 98% (01-16 @ 05:15) (91% - 99%)    I&O's Summary    15 Kris 2023 07:01  -  16 Jan 2023 07:00  --------------------------------------------------------  IN: 0 mL / OUT: 602 mL / NET: -602 mL        REVIEW OF SYSTEMS:  CONSTITUTIONAL: patient weak and tired   EYES: blurry vision   RESPIRATORY: No cough, wheezing, hemoptysis; No shortness of breath  CARDIOVASCULAR: No chest pain or palpitations  GASTROINTESTINAL: No abdominal or epigastric pain. No nausea, vomiting, or hematemesis; No diarrhea or constipation.  GENITOURINARY: No Polyuria      PHYSICAL EXAM:  GENERAL: Patient is awake , alert , drowsy but responsive   NECK: No thyroid enlargement, no palpable nodules   LUNGS: poor inspiratory effort   CARDIOVASCULAR: S1/S2 present, RRR   ABD: Soft, non-tender, non-distended, +BS  EXT:  right AKA, left chronic skin changes over leg and dressing over foot , bilateral skin changes over arms too       LABS:                        11.0   8.67  )-----------( 353      ( 16 Jan 2023 07:55 )             38.6     01-16    137  |  101  |  27<H>  ----------------------------<  102<H>  5.1<H>   |  27  |  0.9    Ca    8.8      16 Jan 2023 07:55  Phos  2.7     01-16  Mg     1.9     01-16    TPro  6.8  /  Alb  3.3<L>  /  TBili  0.7  /  DBili  x   /  AST  41  /  ALT  27  /  AlkPhos  135<H>  01-16              POCT Blood Glucose.: 158 mg/dL (01-16-23 @ 10:53)  POCT Blood Glucose.: 88 mg/dL (01-16-23 @ 07:34)  POCT Blood Glucose.: 130 mg/dL (01-15-23 @ 21:55)  POCT Blood Glucose.: 96 mg/dL (01-15-23 @ 16:57)  POCT Blood Glucose.: 62 mg/dL (01-15-23 @ 16:29)  POCT Blood Glucose.: 109 mg/dL (01-15-23 @ 11:15)  POCT Blood Glucose.: 188 mg/dL (01-15-23 @ 07:34)  POCT Blood Glucose.: 216 mg/dL (01-14-23 @ 22:28)  POCT Blood Glucose.: 181 mg/dL (01-14-23 @ 16:42)  POCT Blood Glucose.: 190 mg/dL (01-14-23 @ 11:55)  POCT Blood Glucose.: 138 mg/dL (01-14-23 @ 08:07)  POCT Blood Glucose.: 189 mg/dL (01-14-23 @ 06:45)  POCT Blood Glucose.: 106 mg/dL (01-14-23 @ 06:10)  POCT Blood Glucose.: 133 mg/dL (01-14-23 @ 05:06)  POCT Blood Glucose.: 194 mg/dL (01-14-23 @ 04:12)  POCT Blood Glucose.: 102 mg/dL (01-14-23 @ 03:33)  POCT Blood Glucose.: 138 mg/dL (01-14-23 @ 02:23)  POCT Blood Glucose.: 178 mg/dL (01-14-23 @ 01:28)  POCT Blood Glucose.: 96 mg/dL (01-14-23 @ 00:59)  POCT Blood Glucose.: 118 mg/dL (01-13-23 @ 23:56)  POCT Blood Glucose.: 177 mg/dL (01-13-23 @ 22:55)  POCT Blood Glucose.: 177 mg/dL (01-13-23 @ 22:42)  POCT Blood Glucose.: 109 mg/dL (01-13-23 @ 22:20)  POCT Blood Glucose.: 39 mg/dL (01-13-23 @ 21:52)  POCT Blood Glucose.: 96 mg/dL (01-13-23 @ 19:40)  POCT Blood Glucose.: 96 mg/dL (01-13-23 @ 17:25)  POCT Blood Glucose.: 39 mg/dL (01-13-23 @ 16:47)  POCT Blood Glucose.: 31 mg/dL (01-13-23 @ 14:39)    TSH 5.61; Total T3 --; Free T4 --

## 2023-01-16 NOTE — PROGRESS NOTE ADULT - SUBJECTIVE AND OBJECTIVE BOX
FLOWER MARISCAL 67y Male  MRN#: [id number]   Hospital Day: 5d    HPI:  66 yo M w HTN, HLD, CAD/MI s/p CABG/VERONICA, severe HF/ICM s/p AICD, Uncontrolled DM w peripheral neuropathy, R AKA, MRSA bacteremia, Psoriasis (on topical steroids), L ankle surgery with fixation hardware presents for worsening rash and tenderness over bilateral hand and left LE associated with redness and drainage over past week. Patient says the leg has been gradually worsening over the last few days. He is also experiencing b/l wrist swelling and pain.    Of note, patieint had a R TKA several years ago c/b recurrent PJI and multiple revisions  in 09/2020 by Dr. Castro (explant and abx spacer exchange), and most recently transferred to [facility] on 1/6/22  from [facility] for MRSA bacteremia due to recurrent PJI s/p Revision gastroc flap by PRS, antibiotic cement spacer removal, I&D, replacement on 1/6/22, and subsequent OR with vascular on 1/10/22 for right AKA and Closure of MOISES on 1/14/22. Patient also w  history of acute cholecystitis s/p perc sudhir 2/28/20 (drain removed in late May 2020) c/b by cholecystocutaneous excision of cholecystocutaneous fistulous tract with Dr. King in 04/08/2021 and eventual elective resection of fistula tract and partial cholecystectomy in 06/2022.     In ED, T 98.4, /86, HR 77, RR 17, SpO2 99; Labs significant for Hgb 11.8 w MCV 64.7 and increased RDW, Trop .06, BNP 9402 and VBG pCO2 65    Patient started on ABX and admitted for further evaluation. (11 Jan 2023 03:35)      SUBJECTIVE  Patient is a 67y old Male who presents with a chief complaint of recurrent cellulitis (15 Kris 2023 15:26)  Currently admitted to medicine with the primary diagnosis of Cellulitis      INTERVAL HPI AND OVERNIGHT EVENTS:  Patient was examined and seen at bedside. This morning he is resting comfortably in bed and reports no issues or overnight events.      OBJECTIVE  PAST MEDICAL & SURGICAL HISTORY  Status post percutaneous transluminal coronary angioplasty  2 stents    Atherosclerosis of coronary artery  CAD (coronary artery disease)    Osteoarthritis    HLD (hyperlipidemia)    Diabetes  on insulin pump    CHF (congestive heart failure)  EF ~ 25%    History of celiac disease    Diverticulitis    STEMI (ST elevation myocardial infarction)    Diabetic neuropathy  Hands and Feet    Anxiety and depression    Other postprocedural status  Fixation hardware in foot LEFT    Stented coronary artery  10/18 heart attack  INFERIOR WALL MI    S/P CABG x 1  2018    S/P TKR (total knee replacement), right  with infection Mrsa   per pt he was cleared from MRSA infection    Surgery, elective  right knee wound debridement    History of open reduction and internal fixation (ORIF) procedure  right hip    H/O shoulder surgery  right    AICD (automatic cardioverter/defibrillator) present  St Kali    Cholecystostomy care  drain inserted 2020 &amp; removed 4 months ago    History of tonsillectomy    History of hip replacement, total, right    Elective surgery  plastic surgery Left shin      ALLERGIES:  ACE inhibitors (Rash)  Entresto (Swelling)    MEDICATIONS:  STANDING MEDICATIONS  aspirin enteric coated 81 milliGRAM(s) Oral daily  atorvastatin 80 milliGRAM(s) Oral at bedtime  bacitracin   Ointment 1 Application(s) Topical two times a day  cefepime   IVPB 2000 milliGRAM(s) IV Intermittent every 8 hours  chlorhexidine 2% Cloths 1 Application(s) Topical <User Schedule>  clotrimazole 1% Cream 1 Application(s) Topical two times a day  digoxin     Tablet 250 MICROGram(s) Oral daily  DULoxetine 30 milliGRAM(s) Oral <User Schedule>  enoxaparin Injectable 40 milliGRAM(s) SubCutaneous every 24 hours  glucagon  Injectable 1 milliGRAM(s) IntraMuscular once  insulin glargine Injectable (LANTUS) 20 Unit(s) SubCutaneous at bedtime  insulin lispro (ADMELOG) corrective regimen sliding scale   SubCutaneous three times a day before meals  insulin lispro Injectable (ADMELOG) 6 Unit(s) SubCutaneous three times a day before meals  latanoprost 0.005% Ophthalmic Solution 1 Drop(s) Both EYES at bedtime  levothyroxine 25 MICROGram(s) Oral daily  metoprolol succinate ER 25 milliGRAM(s) Oral daily  multivitamin 1 Tablet(s) Oral daily  naloxone Injectable 1 milliGRAM(s) IV Push once  pantoprazole    Tablet 40 milliGRAM(s) Oral before breakfast  prasugrel 10 milliGRAM(s) Oral daily  vancomycin  IVPB 1000 milliGRAM(s) IV Intermittent every 24 hours    PRN MEDICATIONS  oxycodone    5 mG/acetaminophen 325 mG 1 Tablet(s) Oral every 8 hours PRN      VITAL SIGNS: Last 24 Hours  T(C): 36.2 (16 Jan 2023 05:15), Max: 36.9 (15 Kris 2023 20:06)  T(F): 97.1 (16 Jan 2023 05:15), Max: 98.4 (15 Kris 2023 20:06)  HR: 95 (16 Jan 2023 05:15) (88 - 95)  BP: 130/78 (16 Jan 2023 05:15) (130/78 - 140/74)  BP(mean): --  RR: 18 (16 Jan 2023 05:15) (18 - 20)  SpO2: 98% (16 Jan 2023 05:15) (98% - 98%)    LABS:                        11.0   8.67  )-----------( 353      ( 16 Jan 2023 07:55 )             38.6     01-16    137  |  101  |  27<H>  ----------------------------<  102<H>  5.1<H>   |  27  |  0.9    Ca    8.8      16 Jan 2023 07:55  Phos  2.7     01-16  Mg     1.9     01-16    TPro  6.8  /  Alb  3.3<L>  /  TBili  0.7  /  DBili  x   /  AST  41  /  ALT  27  /  AlkPhos  135<H>  01-16              Culture - Fungal, Blood (collected 13 Jan 2023 22:39)  Source: .Blood Blood  Preliminary Report (16 Jan 2023 12:24):    Testing in progress    Culture - Blood (collected 13 Jan 2023 19:11)  Source: .Blood Blood  Preliminary Report (14 Jan 2023 23:02):    No growth to date.          RADIOLOGY:      PHYSICAL EXAM:  HEAD: Atraumatic, normocephalic  EYES: EOM intact, PERRLA, conjunctiva and sclera clear  ENT: Supple, no masses, no thyromegaly, no bruits, no JVD; moist mucous membranes  PULMONARY: Clear to auscultation bilaterally; no wheezes, rales, or rhonchi  CARDIOVASCULAR: Regular rate and rhythm; no murmurs, rubs, or gallops  GASTROINTESTINAL: Soft, non-tender, non-distended; bowel sounds present  MUSCULOSKELETAL: R BKA, LLE cellulitis  NEUROLOGY: non-focal  SKIN: erythema and scaly skin over bilateral dorsum of hands and LLE    ASSESSMENT & PLAN  66 yo M w HTN, HLD, CAD/MI s/p CABG/VERONICA, severe HF/ICM s/p AICD, Uncontrolled DM w peripheral neuropathy, R AKA, MRSA bacteremia, Psoriasis (on topical steroids), L ankle surgery with fixation hardware presents for recurrent cellulitis. Patient started on Abx and admitted for further evaluation    # Recurrent LLE Cellulitis in setting of mrsa bacteremia and numerous joint infections in past - not septic on admission  # h/o b/l wrist Psoriasis (on topical steroids per dermatologist) - both wrapped  - LLE erythematous with ulcers/abrasions/bullae  - c/w IV vanc and cefepime  - BCX negative  - MRSA nares  - pain control prn  - follow up ID for Abx duration     # HTN / HLD  # H/o CAD/MI s/p CABG/VERONICA  # Severe HF/ICM s/p AICD, euvolemic on exam  - c/w home asa, statin, digoxin, effient, toprol; takes lasix prn for swelling  - c/w outpatient follow up with Dr. Lopez    # Elevated troponin, stable  - no active chest pain, monitor for symptom    # Uncontrolled DM ( last A1c 12) formerly on insulin pump now on b/b  - apparently takes lantus 20u qHS and lispro 6 qAC  - HbA1c: 9.1%  - f/u endo cs     # Hypothyroidism  - c/w home synthroid    # GERD  - c/w home PPI    # Depression/Anxiety  - c/w home duloxetine 30mg TID    # Restless leg syndrome  - on valium 2mg bid prn at home    # Microcytic Anemia  - f/u iron panel and monitor cbc    # H/o Diverticulitis and Celiac Disease  - Nutrition f/u and vitamin replacement prn    DVT ppx: on lovenox subcut  GI ppx: ppi  Code status: full code    FLOWER MARISCAL 67y Male  MRN#: [id number]   Hospital Day: 5d    HPI:  66 yo M w HTN, HLD, CAD/MI s/p CABG/VERONICA, severe HF/ICM s/p AICD, Uncontrolled DM w peripheral neuropathy, R AKA, MRSA bacteremia, Psoriasis (on topical steroids), L ankle surgery with fixation hardware presents for worsening rash and tenderness over bilateral hand and left LE associated with redness and drainage over past week. Patient says the leg has been gradually worsening over the last few days. He is also experiencing b/l wrist swelling and pain.    Of note, patieint had a R TKA several years ago c/b recurrent PJI and multiple revisions  in 09/2020 by Dr. Castro (explant and abx spacer exchange), and most recently transferred to [facility] on 1/6/22  from [facility] for MRSA bacteremia due to recurrent PJI s/p Revision gastroc flap by PRS, antibiotic cement spacer removal, I&D, replacement on 1/6/22, and subsequent OR with vascular on 1/10/22 for right AKA and Closure of MOISES on 1/14/22. Patient also w  history of acute cholecystitis s/p perc sudhir 2/28/20 (drain removed in late May 2020) c/b by cholecystocutaneous excision of cholecystocutaneous fistulous tract with Dr. King in 04/08/2021 and eventual elective resection of fistula tract and partial cholecystectomy in 06/2022.     In ED, T 98.4, /86, HR 77, RR 17, SpO2 99; Labs significant for Hgb 11.8 w MCV 64.7 and increased RDW, Trop .06, BNP 9402 and VBG pCO2 65    Patient started on ABX and admitted for further evaluation. (11 Jan 2023 03:35)      SUBJECTIVE  Patient is a 67y old Male who presents with a chief complaint of recurrent cellulitis (15 Kris 2023 15:26)  Currently admitted to medicine with the primary diagnosis of Cellulitis      INTERVAL HPI AND OVERNIGHT EVENTS:  Patient was examined and seen at bedside. This morning he is resting comfortably in bed and reports no issues or overnight events.      OBJECTIVE  PAST MEDICAL & SURGICAL HISTORY  Status post percutaneous transluminal coronary angioplasty  2 stents    Atherosclerosis of coronary artery  CAD (coronary artery disease)    Osteoarthritis    HLD (hyperlipidemia)    Diabetes  on insulin pump    CHF (congestive heart failure)  EF ~ 25%    History of celiac disease    Diverticulitis    STEMI (ST elevation myocardial infarction)    Diabetic neuropathy  Hands and Feet    Anxiety and depression    Other postprocedural status  Fixation hardware in foot LEFT    Stented coronary artery  10/18 heart attack  INFERIOR WALL MI    S/P CABG x 1  2018    S/P TKR (total knee replacement), right  with infection Mrsa   per pt he was cleared from MRSA infection    Surgery, elective  right knee wound debridement    History of open reduction and internal fixation (ORIF) procedure  right hip    H/O shoulder surgery  right    AICD (automatic cardioverter/defibrillator) present  St Kali    Cholecystostomy care  drain inserted 2020 &amp; removed 4 months ago    History of tonsillectomy    History of hip replacement, total, right    Elective surgery  plastic surgery Left shin      ALLERGIES:  ACE inhibitors (Rash)  Entresto (Swelling)    MEDICATIONS:  STANDING MEDICATIONS  aspirin enteric coated 81 milliGRAM(s) Oral daily  atorvastatin 80 milliGRAM(s) Oral at bedtime  bacitracin   Ointment 1 Application(s) Topical two times a day  cefepime   IVPB 2000 milliGRAM(s) IV Intermittent every 8 hours  chlorhexidine 2% Cloths 1 Application(s) Topical <User Schedule>  clotrimazole 1% Cream 1 Application(s) Topical two times a day  digoxin     Tablet 250 MICROGram(s) Oral daily  DULoxetine 30 milliGRAM(s) Oral <User Schedule>  enoxaparin Injectable 40 milliGRAM(s) SubCutaneous every 24 hours  glucagon  Injectable 1 milliGRAM(s) IntraMuscular once  insulin glargine Injectable (LANTUS) 20 Unit(s) SubCutaneous at bedtime  insulin lispro (ADMELOG) corrective regimen sliding scale   SubCutaneous three times a day before meals  insulin lispro Injectable (ADMELOG) 6 Unit(s) SubCutaneous three times a day before meals  latanoprost 0.005% Ophthalmic Solution 1 Drop(s) Both EYES at bedtime  levothyroxine 25 MICROGram(s) Oral daily  metoprolol succinate ER 25 milliGRAM(s) Oral daily  multivitamin 1 Tablet(s) Oral daily  naloxone Injectable 1 milliGRAM(s) IV Push once  pantoprazole    Tablet 40 milliGRAM(s) Oral before breakfast  prasugrel 10 milliGRAM(s) Oral daily  vancomycin  IVPB 1000 milliGRAM(s) IV Intermittent every 24 hours    PRN MEDICATIONS  oxycodone    5 mG/acetaminophen 325 mG 1 Tablet(s) Oral every 8 hours PRN      VITAL SIGNS: Last 24 Hours  T(C): 36.2 (16 Jan 2023 05:15), Max: 36.9 (15 Kris 2023 20:06)  T(F): 97.1 (16 Jan 2023 05:15), Max: 98.4 (15 Kris 2023 20:06)  HR: 95 (16 Jan 2023 05:15) (88 - 95)  BP: 130/78 (16 Jan 2023 05:15) (130/78 - 140/74)  BP(mean): --  RR: 18 (16 Jan 2023 05:15) (18 - 20)  SpO2: 98% (16 Jan 2023 05:15) (98% - 98%)    LABS:                        11.0   8.67  )-----------( 353      ( 16 Jan 2023 07:55 )             38.6     01-16    137  |  101  |  27<H>  ----------------------------<  102<H>  5.1<H>   |  27  |  0.9    Ca    8.8      16 Jan 2023 07:55  Phos  2.7     01-16  Mg     1.9     01-16    TPro  6.8  /  Alb  3.3<L>  /  TBili  0.7  /  DBili  x   /  AST  41  /  ALT  27  /  AlkPhos  135<H>  01-16              Culture - Fungal, Blood (collected 13 Jan 2023 22:39)  Source: .Blood Blood  Preliminary Report (16 Jan 2023 12:24):    Testing in progress    Culture - Blood (collected 13 Jan 2023 19:11)  Source: .Blood Blood  Preliminary Report (14 Jan 2023 23:02):    No growth to date.          RADIOLOGY:      PHYSICAL EXAM:  HEAD: Atraumatic, normocephalic  EYES: EOM intact, PERRLA, conjunctiva and sclera clear  ENT: Supple, no masses, no thyromegaly, no bruits, no JVD; moist mucous membranes  PULMONARY: Clear to auscultation bilaterally; no wheezes, rales, or rhonchi  CARDIOVASCULAR: Regular rate and rhythm; no murmurs, rubs, or gallops  GASTROINTESTINAL: Soft, non-tender, non-distended; bowel sounds present  MUSCULOSKELETAL: R AKA, LLE cellulitis  NEUROLOGY: agitated, confused   SKIN: erythema and scaly skin over bilateral dorsum of hands and LLE    ASSESSMENT & PLAN  66 yo M w HTN, HLD, CAD/MI s/p CABG/VERONICA, severe HF/ICM s/p AICD, Uncontrolled DM w peripheral neuropathy, R AKA, MRSA bacteremia, Psoriasis (on topical steroids), L ankle surgery with fixation hardware presents for recurrent cellulitis. Patient started on Abx and admitted for further evaluation    # Recurrent LLE Cellulitis in setting of mrsa bacteremia and numerous joint infections in past - not septic on admission  # h/o b/l wrist Psoriasis (on topical steroids per dermatologist) - both wrapped  - LLE erythematous with ulcers/abrasions/bullae  - c/w IV vanc and cefepime  - BCX negative  - MRSA nares  - pain control prn  - follow up ID for Abx duration     # HTN / HLD  # H/o CAD/MI s/p CABG/VERONICA  # Severe HF/ICM s/p AICD, euvolemic on exam  - c/w home asa, statin, digoxin, effient, toprol; takes lasix prn for swelling  - c/w outpatient follow up with Dr. Lopez    # Elevated troponin, stable  - no active chest pain, monitor for symptom    # Uncontrolled DM ( last A1c 12) formerly on insulin pump now on b/b  - apparently takes lantus 20u qHS and lispro 6 qAC  - HbA1c: 9.1%  - f/u endo cs     # Hypothyroidism  - c/w home synthroid    # GERD  - c/w home PPI    # Depression/Anxiety  - c/w home duloxetine 30mg TID    # Restless leg syndrome  - on valium 2mg bid prn at home    # Microcytic Anemia  - f/u iron panel and monitor cbc    # H/o Diverticulitis and Celiac Disease  - Nutrition f/u and vitamin replacement prn    DVT ppx: on lovenox subcut  GI ppx: ppi  Code status: full code

## 2023-01-16 NOTE — CONSULT NOTE ADULT - ASSESSMENT
This 67y old male with complicated PMH is referred to neurology for new AMS; however, the patient was seen by neurology on Jan 13 for the same complain with a working diagnosis of toxic/metabolic encephalopathy (polypharmacy, hypoglycemia), with EEG showed generalized slowing. The patient chart showed a note from yesterday mentions the patient was confused. The patient was started today on Cefepime, which may contribute to his encephalopathy.   Current impression, toxic/metabolic encephalopathy/delirium     Recommendations:  - Continue infection work up as per primary team and ID  - Avoid hypoglycemia   - Change Cefepime when possible (the only treatment for cefepime induced encephalopathy is stopping the medications)  - Consider rEEG to be sure no seizure or NCSE   - Avoid opioids   Delirium precautions include:  - place patient's bed near window  - optimize sleep-wake cycle, minimize environmental noise  - reorientation techniques and memory cues such as calendar, clocks, family photos  - use verbal redirection as first line  - minimize lines and restraints, unless patient a danger to self or others   - ensure adequate pain control, use opioid sparing regimens when possible  - minimize use of anticholinergic, antihistaminic, and benzodiazepine medications.      Thank you for sharing this patient with me; please do not hesitate to contact me in case of any question.        This 67y old male with complicated PMH is referred to neurology for new AMS; however, the patient was seen by neurology on Jan 13 for the same complain with a working diagnosis of toxic/metabolic encephalopathy (polypharmacy, hypoglycemia), with EEG showed generalized slowing. The patient chart showed a note from yesterday mentions the patient was confused. The patient was started today on Cefepime, which may contribute to his encephalopathy.   Current impression, toxic/metabolic encephalopathy/delirium     Recommendations:  - Continue infection work up as per primary team and ID  - Avoid hypoglycemia   - Change Cefepime when possible (the only treatment for cefepime induced encephalopathy is stopping the medications)  - Consider rEEG to be sure no seizure or NCSE   - Avoid opioids   - Check B12, FT4, FT3  Delirium precautions include:  - place patient's bed near window  - optimize sleep-wake cycle, minimize environmental noise  - reorientation techniques and memory cues such as calendar, clocks, family photos  - use verbal redirection as first line  - minimize lines and restraints, unless patient a danger to self or others   - ensure adequate pain control, use opioid sparing regimens when possible  - minimize use of anticholinergic, antihistaminic, and benzodiazepine medications.      Thank you for sharing this patient with me; please do not hesitate to contact me in case of any question.

## 2023-01-17 NOTE — PHARMACOTHERAPY INTERVENTION NOTE - NSPHARMCOMMASP
ASP - Lab/ test recommended
ASP - Lab/ test recommended
ASP - Dose optimization/Non-Renal dose adjustment
ASP - Dose optimization/Non-Renal dose adjustment
ASP - Renal dose adjustment

## 2023-01-17 NOTE — PHYSICAL THERAPY INITIAL EVALUATION ADULT - STANDING BALANCE: STATIC
n/t 2* gen weakness, limited Lt knee ext -30 deg/+hamstring tigthness, h/o L ankle surgery with fixation hardware, R AKA, and poor safety awareness.

## 2023-01-17 NOTE — OCCUPATIONAL THERAPY INITIAL EVALUATION ADULT - SPECIFY REASON(S)
Upon entry, pt agitated, 1:1 sit and wife present. Pt wife respectfully declined therapy for pt secondary to pt agitation. OT to hold and f/u at another time.

## 2023-01-17 NOTE — PHYSICAL THERAPY INITIAL EVALUATION ADULT - GENERAL OBSERVATIONS, REHAB EVAL
Pt encountered in semi-richey position in bed, A & O x 1, confused but in NAD, follow simple command,+IV lock, voices no pain, 1:1 present at b/s, +skin disorder noted 2* h/o psoriasis. Pt requires Mod A in bed mobility. OOB transfer not performed secondary gen weakness, limited Lt knee ext -30 deg/+hamstring tigthness, h/o L ankle surgery with fixation hardware, R AKA, and poor safety awareness. Pt will benefit from skilled PT 1-2x/wk to inc strength, functional activity, and balance.

## 2023-01-17 NOTE — PROGRESS NOTE ADULT - SUBJECTIVE AND OBJECTIVE BOX
FLOWER MARISCAL 67y Male  MRN#: [id number]   Hospital Day: 6d    HPI:  66 yo M w HTN, HLD, CAD/MI s/p CABG/VERONICA, severe HF/ICM s/p AICD, Uncontrolled DM w peripheral neuropathy, R AKA, MRSA bacteremia, Psoriasis (on topical steroids), L ankle surgery with fixation hardware presents for worsening rash and tenderness over bilateral hand and left LE associated with redness and drainage over past week. Patient says the leg has been gradually worsening over the last few days. He is also experiencing b/l wrist swelling and pain.    Of note, patieint had a R TKA several years ago c/b recurrent PJI and multiple revisions  in 09/2020 by Dr. Castro (explant and abx spacer exchange), and most recently transferred to [facility] on 1/6/22  from [facility] for MRSA bacteremia due to recurrent PJI s/p Revision gastroc flap by PRS, antibiotic cement spacer removal, I&D, replacement on 1/6/22, and subsequent OR with vascular on 1/10/22 for right AKA and Closure of MOISES on 1/14/22. Patient also w  history of acute cholecystitis s/p perc sudhir 2/28/20 (drain removed in late May 2020) c/b by cholecystocutaneous excision of cholecystocutaneous fistulous tract with Dr. King in 04/08/2021 and eventual elective resection of fistula tract and partial cholecystectomy in 06/2022.     In ED, T 98.4, /86, HR 77, RR 17, SpO2 99; Labs significant for Hgb 11.8 w MCV 64.7 and increased RDW, Trop .06, BNP 9402 and VBG pCO2 65    Patient started on ABX and admitted for further evaluation. (11 Jan 2023 03:35)      SUBJECTIVE  Patient is a 67y old Male who presents with a chief complaint of recurrent cellulitis (17 Jan 2023 16:00)  Currently admitted to medicine with the primary diagnosis of Cellulitis      INTERVAL HPI AND OVERNIGHT EVENTS:  Patient was examined and seen at bedside. This morning he is resting comfortably in bed and reports no issues or overnight events.    OBJECTIVE  PAST MEDICAL & SURGICAL HISTORY  Status post percutaneous transluminal coronary angioplasty  2 stents    Atherosclerosis of coronary artery  CAD (coronary artery disease)    Osteoarthritis    HLD (hyperlipidemia)    Diabetes  on insulin pump    CHF (congestive heart failure)  EF ~ 25%    History of celiac disease    Diverticulitis    STEMI (ST elevation myocardial infarction)    Diabetic neuropathy  Hands and Feet    Anxiety and depression    Other postprocedural status  Fixation hardware in foot LEFT    Stented coronary artery  10/18 heart attack  INFERIOR WALL MI    S/P CABG x 1  2018    S/P TKR (total knee replacement), right  with infection Mrsa   per pt he was cleared from MRSA infection    Surgery, elective  right knee wound debridement    History of open reduction and internal fixation (ORIF) procedure  right hip    H/O shoulder surgery  right    AICD (automatic cardioverter/defibrillator) present  St Kali    Cholecystostomy care  drain inserted 2020 &amp; removed 4 months ago    History of tonsillectomy    History of hip replacement, total, right    Elective surgery  plastic surgery Left shin      ALLERGIES:  ACE inhibitors (Rash)  Entresto (Swelling)    MEDICATIONS:  STANDING MEDICATIONS  aspirin enteric coated 81 milliGRAM(s) Oral daily  atorvastatin 80 milliGRAM(s) Oral at bedtime  bacitracin   Ointment 1 Application(s) Topical two times a day  cefTRIAXone   IVPB 2000 milliGRAM(s) IV Intermittent every 24 hours  chlorhexidine 2% Cloths 1 Application(s) Topical <User Schedule>  clobetasol 0.05% Cream 1 Application(s) Topical two times a day  clotrimazole 1% Cream 1 Application(s) Topical two times a day  digoxin     Tablet 250 MICROGram(s) Oral daily  DULoxetine 30 milliGRAM(s) Oral <User Schedule>  enoxaparin Injectable 40 milliGRAM(s) SubCutaneous every 24 hours  glucagon  Injectable 1 milliGRAM(s) IntraMuscular once  insulin lispro (ADMELOG) corrective regimen sliding scale   SubCutaneous three times a day before meals  latanoprost 0.005% Ophthalmic Solution 1 Drop(s) Both EYES at bedtime  levothyroxine 25 MICROGram(s) Oral daily  metoprolol succinate ER 25 milliGRAM(s) Oral daily  multivitamin 1 Tablet(s) Oral daily  mupirocin 2% Ointment 1 Application(s) Topical two times a day  naloxone Injectable 1 milliGRAM(s) IV Push once  pantoprazole    Tablet 40 milliGRAM(s) Oral before breakfast  prasugrel 10 milliGRAM(s) Oral daily  vancomycin  IVPB 1000 milliGRAM(s) IV Intermittent every 24 hours    PRN MEDICATIONS  haloperidol     Tablet 1 milliGRAM(s) Oral every 8 hours PRN  haloperidol    Injectable 2 milliGRAM(s) IntraMuscular once PRN  oxycodone    5 mG/acetaminophen 325 mG 1 Tablet(s) Oral every 8 hours PRN      VITAL SIGNS: Last 24 Hours  T(C): 36.8 (17 Jan 2023 14:44), Max: 36.8 (17 Jan 2023 14:44)  T(F): 98.2 (17 Jan 2023 14:44), Max: 98.2 (17 Jan 2023 14:44)  HR: 97 (17 Jan 2023 14:44) (90 - 97)  BP: 110/75 (17 Jan 2023 14:44) (110/75 - 127/87)  BP(mean): --  RR: 18 (17 Jan 2023 14:44) (17 - 18)  SpO2: 98% (17 Jan 2023 05:39) (98% - 98%)    LABS:                        11.6   9.21  )-----------( 377      ( 17 Jan 2023 07:36 )             40.5     01-17    138  |  101  |  30<H>  ----------------------------<  133<H>  5.0   |  22  |  1.0    Ca    9.0      17 Jan 2023 07:36  Phos  2.7     01-16  Mg     1.9     01-16    TPro  6.8  /  Alb  3.3<L>  /  TBili  0.7  /  DBili  x   /  AST  41  /  ALT  27  /  AlkPhos  135<H>  01-16    RADIOLOGY:      PHYSICAL EXAM:  CONSTITUTIONAL: Pt is confused repeats his self   HEAD: Atraumatic, normocephalic  EYES: EOM intact, PERRLA, conjunctiva and sclera clear  ENT: Supple, no masses, no thyromegaly, no bruits, no JVD; moist mucous membranes  PULMONARY: Clear to auscultation bilaterally; no wheezes, rales, or rhonchi  CARDIOVASCULAR: Regular rate and rhythm; no murmurs, rubs, or gallops  GASTROINTESTINAL: Soft, non-tender, non-distended; bowel sounds present  MUSCULOSKELETAL: 2+ peripheral pulses; no clubbing, no cyanosis, no edema  NEUROLOGY: non-focal  SKIN: Psoriatic lesions over bilat hands, LLE cellulitis, tenia pedis     ASSESSMENT & PLAN    66 yo M w HTN, HLD, CAD/MI s/p CABG/VERONICA, severe HF/ICM s/p AICD, Uncontrolled DM w peripheral neuropathy, R AKA, MRSA bacteremia, ?Psoriasis (on topical steroids), L ankle surgery with fixation hardware presents for recurrent cellulitis.     # Recurrent LLE Cellulitis in setting of mrsa bacteremia and numerous joint infections in past - not septic on admission  # Scaling rash b/l UE and LLE   - LLE erythematous with ulcers/abrasions/bullae c/f superimposed cellulitus   - Dermatology eval today- per verbal discussion, does not appear to be psoriasis, but would start high intensity steroids such as Clobetasol   - ID following: c/w IV vanc and was on cefepime for 1 day which is switched to CFTX due to neurosx   - BCX negative      #AMS/Agitation   - initially thought to be 2/2 hypoglycemia - but this has since resolved and pt still agitated   - CTH and EEG unremarkable   - required IM Haldol 1/16   - Appreciate Neuro eval     # HTN / HLD  # H/o CAD/MI s/p CABG/VERONICA  # Severe HF/ICM s/p AICD, euvolemic on exam  - c/w home asa, statin, digoxin, effient, toprol; takes lasix prn for swelling  - c/w outpatient follow up with Dr. Lopez    # Elevated troponin, stable  - no active chest pain    # Uncontrolled DM ( last A1c 12) formerly on insulin pump  #Hypoglycemia   - HbA1c: 9.8%  - AMS 2/2 hypoglycemia this admission   - Endocrine consulted, will hold basal bolus until blood glucose consistently above 180     # Hypothyroidism  - c/w home synthroid  - TSH 5.61, follow up T4     # GERD  - c/w home PPI    # Depression/Anxiety  - c/w home duloxetine 30mg TID    # Restless leg syndrome  - on valium 2mg bid prn at home- held here     # Microcytic Anemia  - f/u iron panel and monitor cbc    # H/o Diverticulitis and Celiac Disease  - Nutrition f/u and vitamin replacement prn    Dispo: acute due to change in mental status, neuro eval pending     #AMS/ agitation- s/p haldol dose, neuro consult placed   #Scaling Rash- started on Clobetasol as per Dermatology reccs   #Hypoglycemia- stopped basal bolus per Endo reccs    FLOWER MARISCAL 67y Male  MRN#: [id number]   Hospital Day: 6d    HPI:  66 yo M w HTN, HLD, CAD/MI s/p CABG/VERONICA, severe HF/ICM s/p AICD, Uncontrolled DM w peripheral neuropathy, R AKA, MRSA bacteremia, Psoriasis (on topical steroids), L ankle surgery with fixation hardware presents for worsening rash and tenderness over bilateral hand and left LE associated with redness and drainage over past week. Patient says the leg has been gradually worsening over the last few days. He is also experiencing b/l wrist swelling and pain.    Of note, patieint had a R TKA several years ago c/b recurrent PJI and multiple revisions  in 09/2020 by Dr. Castro (explant and abx spacer exchange), and most recently transferred to [facility] on 1/6/22  from [facility] for MRSA bacteremia due to recurrent PJI s/p Revision gastroc flap by PRS, antibiotic cement spacer removal, I&D, replacement on 1/6/22, and subsequent OR with vascular on 1/10/22 for right AKA and Closure of MOISES on 1/14/22. Patient also w  history of acute cholecystitis s/p perc sudhir 2/28/20 (drain removed in late May 2020) c/b by cholecystocutaneous excision of cholecystocutaneous fistulous tract with Dr. King in 04/08/2021 and eventual elective resection of fistula tract and partial cholecystectomy in 06/2022.     In ED, T 98.4, /86, HR 77, RR 17, SpO2 99; Labs significant for Hgb 11.8 w MCV 64.7 and increased RDW, Trop .06, BNP 9402 and VBG pCO2 65    Patient started on ABX and admitted for further evaluation. (11 Jan 2023 03:35)      SUBJECTIVE  Patient is a 67y old Male who presents with a chief complaint of recurrent cellulitis (17 Jan 2023 16:00)  Currently admitted to medicine with the primary diagnosis of Cellulitis      INTERVAL HPI AND OVERNIGHT EVENTS:  Patient was examined and seen at bedside. This morning he is resting comfortably in bed and reports no issues or overnight events.    OBJECTIVE  PAST MEDICAL & SURGICAL HISTORY  Status post percutaneous transluminal coronary angioplasty  2 stents    Atherosclerosis of coronary artery  CAD (coronary artery disease)    Osteoarthritis    HLD (hyperlipidemia)    Diabetes  on insulin pump    CHF (congestive heart failure)  EF ~ 25%    History of celiac disease    Diverticulitis    STEMI (ST elevation myocardial infarction)    Diabetic neuropathy  Hands and Feet    Anxiety and depression    Other postprocedural status  Fixation hardware in foot LEFT    Stented coronary artery  10/18 heart attack  INFERIOR WALL MI    S/P CABG x 1  2018    S/P TKR (total knee replacement), right  with infection Mrsa   per pt he was cleared from MRSA infection    Surgery, elective  right knee wound debridement    History of open reduction and internal fixation (ORIF) procedure  right hip    H/O shoulder surgery  right    AICD (automatic cardioverter/defibrillator) present  St Kali    Cholecystostomy care  drain inserted 2020 &amp; removed 4 months ago    History of tonsillectomy    History of hip replacement, total, right    Elective surgery  plastic surgery Left shin      ALLERGIES:  ACE inhibitors (Rash)  Entresto (Swelling)    MEDICATIONS:  STANDING MEDICATIONS  aspirin enteric coated 81 milliGRAM(s) Oral daily  atorvastatin 80 milliGRAM(s) Oral at bedtime  bacitracin   Ointment 1 Application(s) Topical two times a day  cefTRIAXone   IVPB 2000 milliGRAM(s) IV Intermittent every 24 hours  chlorhexidine 2% Cloths 1 Application(s) Topical <User Schedule>  clobetasol 0.05% Cream 1 Application(s) Topical two times a day  clotrimazole 1% Cream 1 Application(s) Topical two times a day  digoxin     Tablet 250 MICROGram(s) Oral daily  DULoxetine 30 milliGRAM(s) Oral <User Schedule>  enoxaparin Injectable 40 milliGRAM(s) SubCutaneous every 24 hours  glucagon  Injectable 1 milliGRAM(s) IntraMuscular once  insulin lispro (ADMELOG) corrective regimen sliding scale   SubCutaneous three times a day before meals  latanoprost 0.005% Ophthalmic Solution 1 Drop(s) Both EYES at bedtime  levothyroxine 25 MICROGram(s) Oral daily  metoprolol succinate ER 25 milliGRAM(s) Oral daily  multivitamin 1 Tablet(s) Oral daily  mupirocin 2% Ointment 1 Application(s) Topical two times a day  naloxone Injectable 1 milliGRAM(s) IV Push once  pantoprazole    Tablet 40 milliGRAM(s) Oral before breakfast  prasugrel 10 milliGRAM(s) Oral daily  vancomycin  IVPB 1000 milliGRAM(s) IV Intermittent every 24 hours    PRN MEDICATIONS  haloperidol     Tablet 1 milliGRAM(s) Oral every 8 hours PRN  haloperidol    Injectable 2 milliGRAM(s) IntraMuscular once PRN  oxycodone    5 mG/acetaminophen 325 mG 1 Tablet(s) Oral every 8 hours PRN      VITAL SIGNS: Last 24 Hours  T(C): 36.8 (17 Jan 2023 14:44), Max: 36.8 (17 Jan 2023 14:44)  T(F): 98.2 (17 Jan 2023 14:44), Max: 98.2 (17 Jan 2023 14:44)  HR: 97 (17 Jan 2023 14:44) (90 - 97)  BP: 110/75 (17 Jan 2023 14:44) (110/75 - 127/87)  BP(mean): --  RR: 18 (17 Jan 2023 14:44) (17 - 18)  SpO2: 98% (17 Jan 2023 05:39) (98% - 98%)    LABS:                        11.6   9.21  )-----------( 377      ( 17 Jan 2023 07:36 )             40.5     01-17    138  |  101  |  30<H>  ----------------------------<  133<H>  5.0   |  22  |  1.0    Ca    9.0      17 Jan 2023 07:36  Phos  2.7     01-16  Mg     1.9     01-16    TPro  6.8  /  Alb  3.3<L>  /  TBili  0.7  /  DBili  x   /  AST  41  /  ALT  27  /  AlkPhos  135<H>  01-16    RADIOLOGY:      PHYSICAL EXAM:  CONSTITUTIONAL: Pt is confused repeats his self   HEAD: Atraumatic, normocephalic  EYES: EOM intact, PERRLA, conjunctiva and sclera clear  ENT: Supple, no masses, no thyromegaly, no bruits, no JVD; moist mucous membranes  PULMONARY: Clear to auscultation bilaterally; no wheezes, rales, or rhonchi  CARDIOVASCULAR: Regular rate and rhythm; no murmurs, rubs, or gallops  GASTROINTESTINAL: Soft, non-tender, non-distended; bowel sounds present  MUSCULOSKELETAL: 2+ peripheral pulses; no clubbing, no cyanosis, no edema  NEUROLOGY: non-focal  SKIN: Psoriatic lesions over bilat hands, LLE cellulitis, tenia pedis     ASSESSMENT & PLAN    66 yo M w HTN, HLD, CAD/MI s/p CABG/VERONICA, severe HF/ICM s/p AICD, Uncontrolled DM w peripheral neuropathy, R AKA, MRSA bacteremia, ?Psoriasis (on topical steroids), L ankle surgery with fixation hardware presents for recurrent cellulitis.     # Recurrent LLE Cellulitis in setting of mrsa bacteremia and numerous joint infections in past - not septic on admission  # Scaling rash b/l UE and LLE   - LLE erythematous with ulcers/abrasions/bullae c/f superimposed cellulitus   - Dermatology eval today- per verbal discussion, does not appear to be psoriasis, but would start high intensity steroids such as Clobetasol   - ID following: c/w IV vanc and was on cefepime for 1 day which is switched to CFTX due to neurosx   - BCX negative      #AMS/Agitation   - initially thought to be 2/2 hypoglycemia - but this has since resolved and pt still agitated   - CTH and EEG unremarkable   - required IM Haldol 1/16   - Neuro cs placed, f/u today     # HTN / HLD  # H/o CAD/MI s/p CABG/VERONICA  # Severe HF/ICM s/p AICD, euvolemic on exam  - c/w home asa, statin, digoxin, effient, toprol; takes lasix prn for swelling  - c/w outpatient follow up with Dr. Lopez    # Elevated troponin, stable  - no active chest pain    # Uncontrolled DM ( last A1c 12) formerly on insulin pump  #Hypoglycemia   - HbA1c: 9.8%  - AMS 2/2 hypoglycemia this admission   - As per endo: Start Lantus 15 u once FS persistently > 180 and once eating better can start admelog 4 units TIDPC , hold if npo or not eating more then 50% of meal     # Hypothyroidism  - c/w home synthroid  - TSH 5.61, follow up T4     # GERD  - c/w home PPI    # Depression/Anxiety  - c/w home duloxetine 30mg TID    # Restless leg syndrome  - on valium 2mg bid prn at home- held here     # Microcytic Anemia  - f/u iron panel and monitor cbc    # H/o Diverticulitis and Celiac Disease  - Nutrition f/u and vitamin replacement prn    Dispo: acute due to change in mental status, neuro eval pending     #AMS/ agitation- s/p haldol dose, neuro consult placed   #Scaling Rash- started on Clobetasol as per Dermatology reccs   #Hypoglycemia- stopped basal bolus per Endo reccs    FLOWER MARISCAL 67y Male  MRN#: [id number]   Hospital Day: 6d    HPI:  66 yo M w HTN, HLD, CAD/MI s/p CABG/VERONICA, severe HF/ICM s/p AICD, Uncontrolled DM w peripheral neuropathy, R AKA, MRSA bacteremia, Psoriasis (on topical steroids), L ankle surgery with fixation hardware presents for worsening rash and tenderness over bilateral hand and left LE associated with redness and drainage over past week. Patient says the leg has been gradually worsening over the last few days. He is also experiencing b/l wrist swelling and pain.    Of note, patieint had a R TKA several years ago c/b recurrent PJI and multiple revisions  in 09/2020 by Dr. Castro (explant and abx spacer exchange), and most recently transferred to [facility] on 1/6/22  from [facility] for MRSA bacteremia due to recurrent PJI s/p Revision gastroc flap by PRS, antibiotic cement spacer removal, I&D, replacement on 1/6/22, and subsequent OR with vascular on 1/10/22 for right AKA and Closure of MOISES on 1/14/22. Patient also w  history of acute cholecystitis s/p perc sudhir 2/28/20 (drain removed in late May 2020) c/b by cholecystocutaneous excision of cholecystocutaneous fistulous tract with Dr. King in 04/08/2021 and eventual elective resection of fistula tract and partial cholecystectomy in 06/2022.     In ED, T 98.4, /86, HR 77, RR 17, SpO2 99; Labs significant for Hgb 11.8 w MCV 64.7 and increased RDW, Trop .06, BNP 9402 and VBG pCO2 65    Patient started on ABX and admitted for further evaluation. (11 Jan 2023 03:35)      SUBJECTIVE  Patient is a 67y old Male who presents with a chief complaint of recurrent cellulitis (17 Jan 2023 16:00)  Currently admitted to medicine with the primary diagnosis of Cellulitis      INTERVAL HPI AND OVERNIGHT EVENTS:  Patient was examined and seen at bedside. This morning he is resting comfortably in bed and reports no issues or overnight events.    OBJECTIVE  PAST MEDICAL & SURGICAL HISTORY  Status post percutaneous transluminal coronary angioplasty  2 stents    Atherosclerosis of coronary artery  CAD (coronary artery disease)    Osteoarthritis    HLD (hyperlipidemia)    Diabetes  on insulin pump    CHF (congestive heart failure)  EF ~ 25%    History of celiac disease    Diverticulitis    STEMI (ST elevation myocardial infarction)    Diabetic neuropathy  Hands and Feet    Anxiety and depression    Other postprocedural status  Fixation hardware in foot LEFT    Stented coronary artery  10/18 heart attack  INFERIOR WALL MI    S/P CABG x 1  2018    S/P TKR (total knee replacement), right  with infection Mrsa   per pt he was cleared from MRSA infection    Surgery, elective  right knee wound debridement    History of open reduction and internal fixation (ORIF) procedure  right hip    H/O shoulder surgery  right    AICD (automatic cardioverter/defibrillator) present  St Kali    Cholecystostomy care  drain inserted 2020 &amp; removed 4 months ago    History of tonsillectomy    History of hip replacement, total, right    Elective surgery  plastic surgery Left shin      ALLERGIES:  ACE inhibitors (Rash)  Entresto (Swelling)    MEDICATIONS:  STANDING MEDICATIONS  aspirin enteric coated 81 milliGRAM(s) Oral daily  atorvastatin 80 milliGRAM(s) Oral at bedtime  bacitracin   Ointment 1 Application(s) Topical two times a day  cefTRIAXone   IVPB 2000 milliGRAM(s) IV Intermittent every 24 hours  chlorhexidine 2% Cloths 1 Application(s) Topical <User Schedule>  clobetasol 0.05% Cream 1 Application(s) Topical two times a day  clotrimazole 1% Cream 1 Application(s) Topical two times a day  digoxin     Tablet 250 MICROGram(s) Oral daily  DULoxetine 30 milliGRAM(s) Oral <User Schedule>  enoxaparin Injectable 40 milliGRAM(s) SubCutaneous every 24 hours  glucagon  Injectable 1 milliGRAM(s) IntraMuscular once  insulin lispro (ADMELOG) corrective regimen sliding scale   SubCutaneous three times a day before meals  latanoprost 0.005% Ophthalmic Solution 1 Drop(s) Both EYES at bedtime  levothyroxine 25 MICROGram(s) Oral daily  metoprolol succinate ER 25 milliGRAM(s) Oral daily  multivitamin 1 Tablet(s) Oral daily  mupirocin 2% Ointment 1 Application(s) Topical two times a day  naloxone Injectable 1 milliGRAM(s) IV Push once  pantoprazole    Tablet 40 milliGRAM(s) Oral before breakfast  prasugrel 10 milliGRAM(s) Oral daily  vancomycin  IVPB 1000 milliGRAM(s) IV Intermittent every 24 hours    PRN MEDICATIONS  haloperidol     Tablet 1 milliGRAM(s) Oral every 8 hours PRN  haloperidol    Injectable 2 milliGRAM(s) IntraMuscular once PRN  oxycodone    5 mG/acetaminophen 325 mG 1 Tablet(s) Oral every 8 hours PRN      VITAL SIGNS: Last 24 Hours  T(C): 36.8 (17 Jan 2023 14:44), Max: 36.8 (17 Jan 2023 14:44)  T(F): 98.2 (17 Jan 2023 14:44), Max: 98.2 (17 Jan 2023 14:44)  HR: 97 (17 Jan 2023 14:44) (90 - 97)  BP: 110/75 (17 Jan 2023 14:44) (110/75 - 127/87)  BP(mean): --  RR: 18 (17 Jan 2023 14:44) (17 - 18)  SpO2: 98% (17 Jan 2023 05:39) (98% - 98%)    LABS:                        11.6   9.21  )-----------( 377      ( 17 Jan 2023 07:36 )             40.5     01-17    138  |  101  |  30<H>  ----------------------------<  133<H>  5.0   |  22  |  1.0    Ca    9.0      17 Jan 2023 07:36  Phos  2.7     01-16  Mg     1.9     01-16    TPro  6.8  /  Alb  3.3<L>  /  TBili  0.7  /  DBili  x   /  AST  41  /  ALT  27  /  AlkPhos  135<H>  01-16    RADIOLOGY:      PHYSICAL EXAM:  CONSTITUTIONAL: Pt is confused repeats his self   HEAD: Atraumatic, normocephalic  EYES: EOM intact, PERRLA, conjunctiva and sclera clear  ENT: Supple, no masses, no thyromegaly, no bruits, no JVD; moist mucous membranes  PULMONARY: Clear to auscultation bilaterally; no wheezes, rales, or rhonchi  CARDIOVASCULAR: Regular rate and rhythm; no murmurs, rubs, or gallops  GASTROINTESTINAL: Soft, non-tender, non-distended; bowel sounds present  MUSCULOSKELETAL: 2+ peripheral pulses; no clubbing, no cyanosis, no edema  NEUROLOGY: non-focal  SKIN: Psoriatic lesions over bilat hands, LLE cellulitis, tenia pedis     ASSESSMENT & PLAN    66 yo M w HTN, HLD, CAD/MI s/p CABG/VERONICA, severe HF/ICM s/p AICD, Uncontrolled DM w peripheral neuropathy, R AKA, MRSA bacteremia, ?Psoriasis (on topical steroids), L ankle surgery with fixation hardware presents for recurrent cellulitis.     # Recurrent LLE Cellulitis in setting of mrsa bacteremia and numerous joint infections in past - not septic on admission  # Scaling rash b/l UE and LLE   - LLE erythematous with ulcers/abrasions/bullae c/f superimposed cellulitus   - Dermatology eval today- per verbal discussion, does not appear to be psoriasis, but would start high intensity steroids such as Clobetasol   - ID following: c/w IV vanc and was on cefepime for 1 day which is switched to CFTX due to neurosx   - BCX negative  - Derm recs available    #AMS/Agitation   - initially thought to be 2/2 hypoglycemia - but this has since resolved and pt still agitated   - CTH and EEG unremarkable   - required IM Haldol 1/16   - Neuro cs placed, f/u today     # HTN / HLD  # H/o CAD/MI s/p CABG/VERONICA  # Severe HF/ICM s/p AICD, euvolemic on exam  - c/w home asa, statin, digoxin, effient, toprol; takes lasix prn for swelling  - c/w outpatient follow up with Dr. Lopez    # Elevated troponin, stable  - no active chest pain    # Uncontrolled DM ( last A1c 12) formerly on insulin pump  #Hypoglycemia   - HbA1c: 9.8%  - AMS 2/2 hypoglycemia this admission   - As per endo: Start Lantus 15 u once FS persistently > 180 and once eating better can start admelog 4 units TIDPC , hold if npo or not eating more then 50% of meal     # Hypothyroidism  - c/w home synthroid  - TSH 5.61, follow up T4     # GERD  - c/w home PPI    # Depression/Anxiety  - c/w home duloxetine 30mg TID    # Restless leg syndrome  - on valium 2mg bid prn at home- held here     # Microcytic Anemia  - f/u iron panel and monitor cbc    # H/o Diverticulitis and Celiac Disease  - Nutrition f/u and vitamin replacement prn    #DVT PPX: LVX 40 QD  #GI PPX: PPI QD        FLOWER MARISCAL 67y Male  MRN#: [id number]   Hospital Day: 6d    HPI:  68 yo M w HTN, HLD, CAD/MI s/p CABG/VERONICA, severe HF/ICM s/p AICD, Uncontrolled DM w peripheral neuropathy, R AKA, MRSA bacteremia, Psoriasis (on topical steroids), L ankle surgery with fixation hardware presents for worsening rash and tenderness over bilateral hand and left LE associated with redness and drainage over past week. Patient says the leg has been gradually worsening over the last few days. He is also experiencing b/l wrist swelling and pain.    Of note, patieint had a R TKA several years ago c/b recurrent PJI and multiple revisions  in 09/2020 by Dr. 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 Patient also w  history of acute cholecystitis s/p perc sudhir 2/28/20 (drain removed in late May 2020) c/b by cholecystocutaneous excision of cholecystocutaneous fistulous tract with Dr. King in 04/08/2021 and eventual elective resection of fistula tract and partial cholecystectomy in 06/2022.     In ED, T 98.4, /86, HR 77, RR 17, SpO2 99; Labs significant for Hgb 11.8 w MCV 64.7 and increased RDW, Trop .06, BNP 9402 and VBG pCO2 65    Patient started on ABX and admitted for further evaluation. (11 Jan 2023 03:35)      SUBJECTIVE  Patient is a 67y old Male who presents with a chief complaint of recurrent cellulitis (17 Jan 2023 16:00)  Currently admitted to medicine with the primary diagnosis of Cellulitis      INTERVAL HPI AND OVERNIGHT EVENTS:  Patient was examined and seen at bedside. This morning he is resting comfortably in bed and reports no issues or overnight events.    OBJECTIVE  PAST MEDICAL & SURGICAL HISTORY  Status post percutaneous transluminal coronary angioplasty  2 stents    Atherosclerosis of coronary artery  CAD (coronary artery disease)    Osteoarthritis    HLD (hyperlipidemia)    Diabetes  on insulin pump    CHF (congestive heart failure)  EF ~ 25%    History of celiac disease    Diverticulitis    STEMI (ST elevation myocardial infarction)    Diabetic neuropathy  Hands and Feet    Anxiety and depression    Other postprocedural status  Fixation hardware in foot LEFT    Stented coronary artery  10/18 heart attack  INFERIOR WALL MI    S/P CABG x 1  2018    S/P TKR (total knee replacement), right  with infection Mrsa   per pt he was cleared from MRSA infection    Surgery, elective  right knee wound debridement    History of open reduction and internal fixation (ORIF) procedure  right hip    H/O shoulder surgery  right    AICD (automatic cardioverter/defibrillator) present  [facility]    Cholecystostomy care  drain inserted 2020 &amp; removed 4 months ago    History of tonsillectomy    History of hip replacement, total, right    Elective surgery  plastic surgery Left shin      ALLERGIES:  ACE inhibitors (Rash)  Entresto (Swelling)    MEDICATIONS:  STANDING MEDICATIONS  aspirin enteric coated 81 milliGRAM(s) Oral daily  atorvastatin 80 milliGRAM(s) Oral at bedtime  bacitracin   Ointment 1 Application(s) Topical two times a day  cefTRIAXone   IVPB 2000 milliGRAM(s) IV Intermittent every 24 hours  chlorhexidine 2% Cloths 1 Application(s) Topical <User Schedule>  clobetasol 0.05% Cream 1 Application(s) Topical two times a day  clotrimazole 1% Cream 1 Application(s) Topical two times a day  digoxin     Tablet 250 MICROGram(s) Oral daily  DULoxetine 30 milliGRAM(s) Oral <User Schedule>  enoxaparin Injectable 40 milliGRAM(s) SubCutaneous every 24 hours  glucagon  Injectable 1 milliGRAM(s) IntraMuscular once  insulin lispro (ADMELOG) corrective regimen sliding scale   SubCutaneous three times a day before meals  latanoprost 0.005% Ophthalmic Solution 1 Drop(s) Both EYES at bedtime  levothyroxine 25 MICROGram(s) Oral daily  metoprolol succinate ER 25 milliGRAM(s) Oral daily  multivitamin 1 Tablet(s) Oral daily  mupirocin 2% Ointment 1 Application(s) Topical two times a day  naloxone Injectable 1 milliGRAM(s) IV Push once  pantoprazole    Tablet 40 milliGRAM(s) Oral before breakfast  prasugrel 10 milliGRAM(s) Oral daily  vancomycin  IVPB 1000 milliGRAM(s) IV Intermittent every 24 hours    PRN MEDICATIONS  haloperidol     Tablet 1 milliGRAM(s) Oral every 8 hours PRN  haloperidol    Injectable 2 milliGRAM(s) IntraMuscular once PRN  oxycodone    5 mG/acetaminophen 325 mG 1 Tablet(s) Oral every 8 hours PRN      VITAL SIGNS: Last 24 Hours  T(C): 36.8 (17 Jan 2023 14:44), Max: 36.8 (17 Jan 2023 14:44)  T(F): 98.2 (17 Jan 2023 14:44), Max: 98.2 (17 Jan 2023 14:44)  HR: 97 (17 Jan 2023 14:44) (90 - 97)  BP: 110/75 (17 Jan 2023 14:44) (110/75 - 127/87)  BP(mean): --  RR: 18 (17 Jan 2023 14:44) (17 - 18)  SpO2: 98% (17 Jan 2023 05:39) (98% - 98%)    LABS:                        11.6   9.21  )-----------( 377      ( 17 Jan 2023 07:36 )             40.5     01-17    138  |  101  |  30<H>  ----------------------------<  133<H>  5.0   |  22  |  1.0    Ca    9.0      17 Jan 2023 07:36  Phos  2.7     01-16  Mg     1.9     01-16    TPro  6.8  /  Alb  3.3<L>  /  TBili  0.7  /  DBili  x   /  AST  41  /  ALT  27  /  AlkPhos  135<H>  01-16    RADIOLOGY:      PHYSICAL EXAM:  CONSTITUTIONAL: Pt is confused repeats his self   HEAD: Atraumatic, normocephalic  EYES: EOM intact, PERRLA, conjunctiva and sclera clear  ENT: Supple, no masses, no thyromegaly, no bruits, no JVD; moist mucous membranes  PULMONARY: Clear to auscultation bilaterally; no wheezes, rales, or rhonchi  CARDIOVASCULAR: Regular rate and rhythm; no murmurs, rubs, or gallops  GASTROINTESTINAL: Soft, non-tender, non-distended; bowel sounds present  MUSCULOSKELETAL: 2+ peripheral pulses; no clubbing, no cyanosis, no edema  NEUROLOGY: AAOx2  SKIN: Psoriatic lesions over bilat hands, LLE cellulitis, tenia pedis     ASSESSMENT & PLAN    68 yo M w HTN, HLD, CAD/MI s/p CABG/VERONICA, severe HF/ICM s/p AICD, Uncontrolled DM w peripheral neuropathy, R AKA, MRSA bacteremia, ?Psoriasis (on topical steroids), L ankle surgery with fixation hardware presents for recurrent cellulitis.     # Recurrent LLE Cellulitis in setting of mrsa bacteremia and numerous joint infections in past - not septic on admission  # Scaling rash b/l UE and LLE   - LLE erythematous with ulcers/abrasions/bullae c/f superimposed cellulitus   - Dermatology eval today- per verbal discussion, does not appear to be psoriasis, but would start high intensity steroids such as Clobetasol   - ID following: c/w IV vanc and was on cefepime for 1 day which is switched to CFTX due to neurosx   - BCX negative  - Derm recs available    #AMS/Agitation   - initially thought to be 2/2 hypoglycemia - but this has since resolved and pt still agitated   - CTH and EEG unremarkable   - required IM Haldol 1/16   - Neuro cs placed, f/u today     # HTN / HLD  # H/o CAD/MI s/p CABG/VERONICA  # Severe HF/ICM s/p AICD, euvolemic on exam  - c/w home asa, statin, digoxin, effient, toprol; takes lasix prn for swelling  - c/w outpatient follow up with Dr. Lopez    # Elevated troponin, stable  - no active chest pain    # Uncontrolled DM ( last A1c 12) formerly on insulin pump  #Hypoglycemia   - HbA1c: 9.8%  - AMS 2/2 hypoglycemia this admission   - As per endo: Start Lantus 15 u once FS persistently > 180 and once eating better can start admelog 4 units TIDPC , hold if npo or not eating more then 50% of meal     # Hypothyroidism  - c/w home synthroid  - TSH 5.61, follow up T4     # GERD  - c/w home PPI    # Depression/Anxiety  - c/w home duloxetine 30mg TID    # Restless leg syndrome  - on valium 2mg bid prn at home- held here     # Microcytic Anemia  - f/u iron panel and monitor cbc    # H/o Diverticulitis and Celiac Disease  - Nutrition f/u and vitamin replacement prn    #DVT PPX: LVX 40 QD  #GI PPX: PPI QD

## 2023-01-17 NOTE — CONSULT NOTE ADULT - PROBLEM SELECTOR RECOMMENDATION 9
+Previous history of chronic opioid therapy, not likely to be opioid tolerance at this point. Low risk of opioid abuse per ORT.  1) Start oxycodone 10mg Q6h prn  2) Change acetaminophen to 1000mg Q8h standing  3) Start pregabalin 75mg BID  4) Continue duloxetine 30mg TID  5) Avoid NSAIDs in the setting of ATC  6) Start miralax, senna

## 2023-01-17 NOTE — CONSULT NOTE ADULT - ASSESSMENT
Patient is a 66 y/o man with history of CAD s/p CABG/VERONICA, CHFrEF s/p AICD, HTN, HL, DM, diabetic neuropathy, anxiety, and TIAGO who was admitted on 1/11/2023 with pain and weeping skin lesions over the bilateral hands and wrists.

## 2023-01-17 NOTE — PHYSICAL THERAPY INITIAL EVALUATION ADULT - LEVEL OF INDEPENDENCE: BED TO CHAIR, REHAB EVAL
gen weakness, limited Lt knee ext -30 deg/+hamstring tigthness, h/o L ankle surgery with fixation hardware, R AKA, and poor safety awareness./unable to perform

## 2023-01-17 NOTE — CONSULT NOTE ADULT - SUBJECTIVE AND OBJECTIVE BOX
Pain Medicine Consult Note    History of Present Illness  Patient is a 68 y/o man with history of CAD s/p CABG/VERONICA, CHFrEF s/p AICD, HTN, HL, DM, diabetic neuropathy, anxiety, and TIAGO who was admitted on 1/11/2023 with pain and weeping skin lesions over the bilateral hands and wrists. The patient states that he has had plaque-like lesions over his dorsal wrists and hands for several months. However, 1 month ago, the patient states that he started to have a severe burning pain over this area as well as erythema over this area. He also started to have some bleeding over the lesions. He states that pain has gotten worse and worse and he sometimes screams out in pain. The pain prevents him from sleeping and he has significant limitations with using his hands for most activities. The patient states that he had previously seen a dermatologist for this problem and was diagnosed with psoriasis. He was prescribed antibiotic ointment, which he had been applying daily. He was taking ibuprofen and acetaminophen prn for his pain. He was previously on chronic opioid therapy for a variety of other joint pains but states that he has been off all opioids for over a year.     Current Inpatient Medication Regimen:  aspirin enteric coated 81 milliGRAM(s) Oral daily  atorvastatin 80 milliGRAM(s) Oral at bedtime  bacitracin   Ointment 1 Application(s) Topical two times a day  cefTRIAXone   IVPB 2000 milliGRAM(s) IV Intermittent every 24 hours  chlorhexidine 2% Cloths 1 Application(s) Topical <User Schedule>  clobetasol 0.05% Cream 1 Application(s) Topical two times a day  clotrimazole 1% Cream 1 Application(s) Topical two times a day  digoxin     Tablet 250 MICROGram(s) Oral daily  DULoxetine 30 milliGRAM(s) Oral <User Schedule>  enoxaparin Injectable 40 milliGRAM(s) SubCutaneous every 24 hours  glucagon  Injectable 1 milliGRAM(s) IntraMuscular once  haloperidol     Tablet 1 milliGRAM(s) Oral every 8 hours PRN  haloperidol    Injectable 2 milliGRAM(s) IntraMuscular once PRN  insulin lispro (ADMELOG) corrective regimen sliding scale   SubCutaneous three times a day before meals  latanoprost 0.005% Ophthalmic Solution 1 Drop(s) Both EYES at bedtime  levothyroxine 25 MICROGram(s) Oral daily  metoprolol succinate ER 25 milliGRAM(s) Oral daily  multivitamin 1 Tablet(s) Oral daily  mupirocin 2% Ointment 1 Application(s) Topical two times a day  naloxone Injectable 1 milliGRAM(s) IV Push once  oxycodone    5 mG/acetaminophen 325 mG 1 Tablet(s) Oral every 8 hours PRN  pantoprazole    Tablet 40 milliGRAM(s) Oral before breakfast  prasugrel 10 milliGRAM(s) Oral daily  vancomycin  IVPB 1000 milliGRAM(s) IV Intermittent every 24 hours      Home Analgesic Regimen:  acetaminophen prn  ibuprofen prn    Allergies:  ACE inhibitors (Rash)  Entresto (Swelling)      Past Medical History:  CAD s/p CABG/VERONICA  CHFrEF s/p AICD  HTN  HL  DM  Diabetic neuropathy  Anxiety  TIAGO   RLS  Psoriasis    Past Surgical History:  Right TKA s/p multiple revisions due to wound infections  Right AKA  Left ankle ORIF  Right total shoulder replacement  Right LISSETH  CABG  Tonsillectomy    Family History:  Denies (adopted)    Social History:  Tobacco - denies  EtOH - denies  Drugs - denies      Review of Systems:  General: no fevers or chills  Eyes: no diplopia or blurred vision  ENT: no rhinorrhea  CV: no chest pain  Resp: no cough or dyspnea  GI: no abdominal pain, constipation, or diarrhea  : no urinary incontinence or dysuria  Neuro: no focal weakness   Psych: +anxiety    Physical Exam:  T(C): 36.8 (01-17-23 @ 14:44), Max: 36.8 (01-17-23 @ 14:44)  HR: 97 (01-17-23 @ 14:44) (90 - 97)  BP: 110/75 (01-17-23 @ 14:44) (110/75 - 127/87)  RR: 18 (01-17-23 @ 14:44) (17 - 18)  SpO2: 98% (01-17-23 @ 05:39) (98% - 98%)  Gen: NAD  Eyes: no glasses or scleral icterus  Head: Normocephalic / Atraumatic  CV: no JVD  Lungs: nonlabored breathing  Abdomen: nondistended, soft  : no fields catheter in place  Back: tenderness to palpation  Neuro: AOx3, Cranial nerves intact  Extremities: UEs: patient with plaque like lesions over the dorsal hands and wrists, some skin breakdown adjacent to the lesions with some evidence of recent bleeding; diffuse erythema over the region  LLE: patient with purple discoloration over the lower leg and foot with skin breakdown; has plaque like lesions over the anterior leg just below the knee  Psych: normal affect      Labs:  CBC  9.21 K/uL [4.80 - 10.80] > 11.6 g/dL<L> [14.0 - 18.0] / 40.5 %<L> [42.0 - 52.0] < 377 K/uL [130 - 400]      BMP  138 mmol/L [135 - 146] | 101 mmol/L [98 - 110] | 30 mg/dL<H> [10 - 20]  5.0 mmol/L [3.5 - 5.0] | 22 mmol/L [17 - 32] | 1.0 mg/dL [0.7 - 1.5]    133 mg/dL<H> [70 - 99]        Imaging Studies:  X-ray left ankle/foot (1/10/2023)  FINDINGS:    Left foot:  Diffuse osteopenia. Screw across the first IP joint, stable in   appearance. Post amputation of the second and third toes to the middle   phalanges. No definite radiographic evidence for osteomyelitis. Blister   along the dorsal hindfoot.    Severe degenerative change at the first MTP joint. Degenerative change   throughout the midfoot, hindfoot, and ankle. Calcaneal heel spur and   Achilles enthesophyte. No significant ankle joint effusion.    Left ankle:  Osteopenia. No acute fracture or dislocation. Symmetric ankle mortise. No   definite radiographic evidence for osteomyelitis. Vascular calcifications.    IMPRESSION:    Diffuse osteopenia without definite acute osseous abnormality.        Opioid Risk Assessment Tool                                                                         Female       Male  Family History  Alcohol                                                              1                3  Illegal drugs                                                       2                3  Rx drugs                                                            4                4    Personal History   Alcohol                                                              3                3  Illegal drugs                                                       4                4  Rx drugs                                                            5                5    Age between 16—45 years                                1                1  History of preadolescent sexual abuse               3                0    Psychological disease  ADD, OCD, bipolar, schizophrenia                      2                2  Depression                                                       1                1    Total Score                                                      __              0    0 - 3 = low risk for future opioid abuse  4 - 7 = moderate risk for future opioid abuse  8+ = high risk for future opioid abuse

## 2023-01-18 NOTE — OCCUPATIONAL THERAPY INITIAL EVALUATION ADULT - PERTINENT HX OF CURRENT PROBLEM, REHAB EVAL
68 yo M w HTN, HLD, CAD/MI s/p CABG/VERONICA, severe HF/ICM s/p AICD, Uncontrolled DM w peripheral neuropathy, R AKA, MRSA bacteremia, Psoriasis (on topical steroids), L ankle surgery with fixation hardware presents for recurrent cellulitis. Patient started on Abx and admitted for further evaluation.

## 2023-01-18 NOTE — PROGRESS NOTE ADULT - SUBJECTIVE AND OBJECTIVE BOX
FLOWER MARISCAL 67y Male  MRN#: [id number]   Hospital Day: 7d    HPI:  66 yo M w HTN, HLD, CAD/MI s/p CABG/VERONICA, severe HF/ICM s/p AICD, Uncontrolled DM w peripheral neuropathy, R AKA, MRSA bacteremia, Psoriasis (on topical steroids), L ankle surgery with fixation hardware presents for worsening rash and tenderness over bilateral hand and left LE associated with redness and drainage over past week. Patient says the leg has been gradually worsening over the last few days. He is also experiencing b/l wrist swelling and pain.    Of note, patieint had a R TKA several years ago c/b recurrent PJI and multiple revisions  in 09/2020 by Dr. Castro (explant and abx spacer exchange), and most recently transferred to [facility] on 1/6/22  from [facility] for MRSA bacteremia due to recurrent PJI s/p Revision gastroc flap by PRS, antibiotic cement spacer removal, I&D, replacement on 1/6/22, and subsequent OR with vascular on 1/10/22 for right AKA and Closure of MOISES on 1/14/22. Patient also w  history of acute cholecystitis s/p perc sudhir 2/28/20 (drain removed in late May 2020) c/b by cholecystocutaneous excision of cholecystocutaneous fistulous tract with Dr. King in 04/08/2021 and eventual elective resection of fistula tract and partial cholecystectomy in 06/2022.     In ED, T 98.4, /86, HR 77, RR 17, SpO2 99; Labs significant for Hgb 11.8 w MCV 64.7 and increased RDW, Trop .06, BNP 9402 and VBG pCO2 65    Patient started on ABX and admitted for further evaluation. (11 Jan 2023 03:35)      SUBJECTIVE  Patient is a 67y old Male who presents with a chief complaint of recurrent cellulitis (17 Jan 2023 16:54)  Currently admitted to medicine with the primary diagnosis of Cellulitis      INTERVAL HPI AND OVERNIGHT EVENTS:  Patient was examined and seen at bedside. This morning he is resting comfortably in bed and reports no issues or overnight events.    OBJECTIVE  PAST MEDICAL & SURGICAL HISTORY  Status post percutaneous transluminal coronary angioplasty  2 stents    Atherosclerosis of coronary artery  CAD (coronary artery disease)    Osteoarthritis    HLD (hyperlipidemia)    Diabetes  on insulin pump    CHF (congestive heart failure)  EF ~ 25%    History of celiac disease    Diverticulitis    STEMI (ST elevation myocardial infarction)    Diabetic neuropathy  Hands and Feet    Anxiety and depression    Other postprocedural status  Fixation hardware in foot LEFT    Stented coronary artery  10/18 heart attack  INFERIOR WALL MI    S/P CABG x 1  2018    S/P TKR (total knee replacement), right  with infection Mrsa   per pt he was cleared from MRSA infection    Surgery, elective  right knee wound debridement    History of open reduction and internal fixation (ORIF) procedure  right hip    H/O shoulder surgery  right    AICD (automatic cardioverter/defibrillator) present  [facility]    Cholecystostomy care  drain inserted 2020 &amp; removed 4 months ago    History of tonsillectomy    History of hip replacement, total, right    Elective surgery  plastic surgery Left shin      ALLERGIES:  ACE inhibitors (Rash)  Entresto (Swelling)    MEDICATIONS:  STANDING MEDICATIONS  aspirin enteric coated 81 milliGRAM(s) Oral daily  atorvastatin 80 milliGRAM(s) Oral at bedtime  bacitracin   Ointment 1 Application(s) Topical two times a day  cefTRIAXone   IVPB 2000 milliGRAM(s) IV Intermittent every 24 hours  chlorhexidine 2% Cloths 1 Application(s) Topical <User Schedule>  clobetasol 0.05% Cream 1 Application(s) Topical two times a day  clotrimazole 1% Cream 1 Application(s) Topical two times a day  digoxin     Tablet 250 MICROGram(s) Oral daily  DULoxetine 30 milliGRAM(s) Oral <User Schedule>  enoxaparin Injectable 40 milliGRAM(s) SubCutaneous every 24 hours  glucagon  Injectable 1 milliGRAM(s) IntraMuscular once  insulin lispro (ADMELOG) corrective regimen sliding scale   SubCutaneous three times a day before meals  latanoprost 0.005% Ophthalmic Solution 1 Drop(s) Both EYES at bedtime  levothyroxine 25 MICROGram(s) Oral daily  metoprolol succinate ER 25 milliGRAM(s) Oral daily  multivitamin 1 Tablet(s) Oral daily  mupirocin 2% Ointment 1 Application(s) Topical two times a day  naloxone Injectable 1 milliGRAM(s) IV Push once  pantoprazole    Tablet 40 milliGRAM(s) Oral before breakfast  prasugrel 10 milliGRAM(s) Oral daily  vancomycin  IVPB        PRN MEDICATIONS  oxycodone    5 mG/acetaminophen 325 mG 1 Tablet(s) Oral every 8 hours PRN      VITAL SIGNS: Last 24 Hours  T(C): 36.3 (18 Jan 2023 12:44), Max: 36.3 (18 Jan 2023 12:44)  T(F): 97.4 (18 Jan 2023 12:44), Max: 97.4 (18 Jan 2023 12:44)  HR: 92 (18 Jan 2023 12:44) (92 - 93)  BP: 127/84 (18 Jan 2023 12:44) (120/80 - 127/84)  BP(mean): --  RR: 18 (18 Jan 2023 12:44) (18 - 18)  SpO2: --    LABS:                        11.6   9.21  )-----------( 377      ( 17 Jan 2023 07:36 )             40.5     01-17    138  |  101  |  30<H>  ----------------------------<  133<H>  5.0   |  22  |  1.0    Ca    9.0      17 Jan 2023 07:36    RADIOLOGY:      PHYSICAL EXAM:  CONSTITUTIONAL: confused agitated   HEAD: Atraumatic, normocephalic  EYES: EOM intact, PERRLA, conjunctiva and sclera clear  ENT: Supple, no masses, no thyromegaly, no bruits, no JVD; moist mucous membranes  PULMONARY: Clear to auscultation bilaterally; no wheezes, rales, or rhonchi  CARDIOVASCULAR: Regular rate and rhythm; no murmurs, rubs, or gallops  GASTROINTESTINAL: Soft, non-tender, non-distended; bowel sounds present  MUSCULOSKELETAL: 2+ peripheral pulses; no clubbing, no cyanosis, no edema  NEUROLOGY: AAOx2  SKIN: Psoriatic lesions over bilat hands, LLE cellulitis, tenia pedis       ASSESSMENT & PLAN  66 yo M w HTN, HLD, CAD/MI s/p CABG/VERONICA, severe HF/ICM s/p AICD, Uncontrolled DM w peripheral neuropathy, R AKA, MRSA bacteremia, ?Psoriasis (on topical steroids), L ankle surgery with fixation hardware presents for recurrent cellulitis.     # Recurrent LLE Cellulitis in setting of mrsa bacteremia and numerous joint infections in past - not septic on admission  # Scaling rash b/l UE and LLE   - LLE erythematous with ulcers/abrasions/bullae c/f superimposed cellulitus   - Dermatology eval today- per verbal discussion, does not appear to be psoriasis, but would start high intensity steroids such as Clobetasol   - ID following: c/w IV vanc and was on cefepime for 1 day which is switched to CFTX due to neurosx   - BCX negative  - Derm recs:  -> Tinea pedis: agree to continue using a topical anti-fungal like clotrimazole cream bid long term to decrease the chances of any tinea contribution to skin integrity in the context of recurrent cellulitis.   -> Ulcer on the 2nd toe: mupirocin ointment bid and keep covered with telfa/ bandage to protect skin from any trauma  -> Erosions and excoriations on the blt hands and LLE can also be treated with topical antibiotics such as mupirocin ointment bid.   -> Psoriasis: while no active lesions are present to suggest psoriasis at this time, a topical steroid ointment like triamcinolone bid and antihistamines like loratidine or cetirizine can help with both itching and the lichenified dry patches on LLE and hands; moisturizers can be used for xerosis/ dry skin: patient should also use soaps and moisturizers for people with sensitive skin like Aveeno or Cetaphil after discharge.   -> Tense blisters with clear liquid on left lower extremity are more likely be secondary to possible Bullous Pemphigoid or diabetes, with unlikely contribution from psoriasis, but triamcinolone ointment bid can also be used on them when they are itchy. A biopsy would not be indicated at this time considering the mental status of the patient     #AMS/Agitation   - initially thought to be 2/2 hypoglycemia - but this has since resolved and pt still agitated   - CTH and EEG unremarkable   - required IM Haldol 1/16   - As per Neuro: repeat EEG     # HTN / HLD  # H/o CAD/MI s/p CABG/VERONICA  # Severe HF/ICM s/p AICD, euvolemic on exam  - c/w home asa, statin, digoxin, effient, toprol; takes lasix prn for swelling  - c/w outpatient follow up with Dr. Lopez    # Elevated troponin, stable  - no active chest pain    # Uncontrolled DM ( last A1c 12) formerly on insulin pump  #Hypoglycemia   - HbA1c: 9.8%  - AMS 2/2 hypoglycemia this admission   - As per endo: Start Lantus 15 u once FS persistently > 180 and once eating better can start admelog 4 units TIDPC , hold if npo or not eating more then 50% of meal     # Hypothyroidism  - c/w home synthroid  - TSH 5.61, follow up T4     # GERD  - c/w home PPI    # Depression/Anxiety  - c/w home duloxetine 30mg TID    # Restless leg syndrome  - on valium 2mg bid prn at home- held here     # Microcytic Anemia  - f/u iron panel and monitor cbc    # H/o Diverticulitis and Celiac Disease  - Nutrition f/u and vitamin replacement prn    #DVT PPX: LVX 40 QD  #GI PPX: PPI QD

## 2023-01-18 NOTE — CHART NOTE - NSCHARTNOTEFT_GEN_A_CORE
Pt follows Dr. hassan outpt, wound care nurse suggested Burn follow up with Dr. Hassan, wounds look complicated for routine wound care

## 2023-01-18 NOTE — PHARMACOTHERAPY INTERVENTION NOTE - COMMENTS
As per policy, ordered a vancomycin trough for 1/12 with AM labs since patient would be due for a trough prior to the fourth dose on 1.25g IV q12h.    Luis Falcon, PharmD, [facility]  Clinical Pharmacy Specialist, Infectious Diseases  Tele-Antimicrobial Stewardship Program (Tele-ASP)  Tele-ASP Phone: [phone]  
Patient on vancomycin 1000mg IV q24h @1800 for LLE cellulitis per Dr. Arreola. Recommended obtaining repeat level on 1/18 with AM labs to reassess. 
Recommended increasing cefepime dose to 2g IV q8h given CrCl of ~67 mL/min. 
The vancomycin level today, 1/15 was 15.4 mg/L. As per Precise[employer] calculations, current vancomycin AUC/YOLIE is 444.39 mg/L*h. Serum creatinine is 1.0 mg/dL. Can continue vancomycin 1000 mg IV q24h.    Luis Falcon, PharmD, [facility]  Clinical Pharmacy Specialist, Infectious Diseases  Tele-Antimicrobial Stewardship Program (Tele-ASP)  Tele-ASP Phone: [phone]  
Updated patient's height for this admission to 185.4 cm, as height was previously documented as 185.4 cm per [facility], and there was no height documented for this admission. 
Patient on vancomycin 1000mg IV q24h for cellulitis per Dr. Arreola. Vancomycin level on 1/18 at 0756 was 10.5mg/L. Per [employer] Bayesian vancomycin dosing software, current regimen of 1000mg IV q24h predicts a subtherapeutic steady-state AUC of 355.51mg/L*hr (goal 400-600). A regimen of 1250mg IV q24h predicts a therapeutic steady-state AUC/YOLIE of 444.39 mg/L*hr. Recommended increasing dose to 1250mg IV q24h starting now, and getting a level on 1/20 with AM labs, prior to third dose of new regimen (does not need to be a trough because PrecisePK will adjust for levels drawn early).    
Patient originally changed to vancomycin 750mg IV q24h to start at 0600 on 1/14 for LLE cellulitis. However, based on vancomycin levels of 28.5 mg/L on 1/12 at 1745 and 18.0 mg/L on 1/13 at 0623, a regimen of 750mg IV q24h now predicts a subtherapeutic steady-state AUC/YOLIE of 346.91 mg/L*hr (goal 400-600) per PrecisePK Bayesian vancomycin dosing software. A regimen of 1000mg IV q24h predicts a therapeutic steady-state AUC/YOLIE of 462.54 mg/L*hr. Recommended changing vancomycin dose to 1000mg IV q24h to start at 1800 this evening. Recommended obtaining repeat vancomycin level on 1/15 with AM labs, prior to third dose of new regimen (does not need to be a trough because Prec[employer]PK will adjust for levels drawn early). 
Recommended to adjust vancomycin dose from 1250 mg IV q12h to 750 mg IV q24h (starting on 1/14 at 6am) since the AUC is currently > 600 mg/L*h, as per PrecisePK calculations. Dose of 750 mg IV q24h is predicted to yield an AUC within range of 400 - 600 mg/L*h.    Luis Falcon, PharmD, [facility]  Clinical Pharmacy Specialist, Infectious Diseases  Tele-Antimicrobial Stewardship Program (Tele-ASP)  Tele-ASP Phone: [phone]

## 2023-01-18 NOTE — OCCUPATIONAL THERAPY INITIAL EVALUATION ADULT - IMPAIRMENTS CONTRIBUTING IMPAIRED BED MOBILITY, REHAB EVAL
decreased endurance/impaired balance/cognition/impaired coordination/decreased flexibility/decreased ROM/decreased strength

## 2023-01-18 NOTE — OCCUPATIONAL THERAPY INITIAL EVALUATION ADULT - ADDITIONAL COMMENTS
Pt poor historian, reports private house with spouse, + MITRA however uses side door. + stall shower, use of w/c in home and reports spouses assists as needed

## 2023-01-18 NOTE — OCCUPATIONAL THERAPY INITIAL EVALUATION ADULT - IMPAIRED TRANSFERS: BED/CHAIR, REHAB EVAL
decreased endurance/impaired balance/cognition/decreased flexibility/decreased ROM/decreased strength

## 2023-01-18 NOTE — OCCUPATIONAL THERAPY INITIAL EVALUATION ADULT - GENERAL OBSERVATIONS, REHAB EVAL
Pt encountered semi richey in bed, + IV locked, + rock N roll belt, + 1:1 sit present in room. Pt agreeable to bedside OT assessment, may be seen as confirmed with RN. Pt returned to bed as found, + IV locked, + rock N roll, + 1:1 sit present

## 2023-01-18 NOTE — OCCUPATIONAL THERAPY INITIAL EVALUATION ADULT - PATIENT PROFILE REVIEW, REHAB EVAL
pt chart thoroughly reviewed prior to OT evaluation/yes
Time Seen 8:35-8:59am pt chart thoroughly reviewed prior to OT evaluation/yes

## 2023-01-18 NOTE — OCCUPATIONAL THERAPY INITIAL EVALUATION ADULT - LEVEL OF INDEPENDENCE: BED TO CHAIR, REHAB EVAL
unsafe at this time, pt with R AKA and LLE knee with 30* flexion contracture, recommend full body lift to chair/unable to perform

## 2023-01-19 NOTE — CONSULT NOTE ADULT - SUBJECTIVE AND OBJECTIVE BOX
Patient is a 67y old  Male who presents with a chief complaint of recurrent cellulitis (19 Jan 2023 10:47)  AM ROUNDS    Vital Signs Last 24 Hrs  T(C): 36.1 (19 Jan 2023 13:39), Max: 36.7 (18 Jan 2023 20:11)  T(F): 96.9 (19 Jan 2023 13:39), Max: 98 (18 Jan 2023 20:11)  HR: 88 (19 Jan 2023 13:39) (88 - 94)  BP: 124/72 (19 Jan 2023 13:39) (120/71 - 126/82)  RR: 19 (19 Jan 2023 13:39) (18 - 19)  SpO2: 98% (19 Jan 2023 09:43) (98% - 98%)    O2 Parameters below as of 19 Jan 2023 09:43  Patient On (Oxygen Delivery Method): room air    LABS:                        11.1   8.80  )-----------( 379      ( 19 Jan 2023 07:28 )             39.2     01-19    134<L>  |  99  |  40<H>  ----------------------------<  143<H>  4.7   |  25  |  1.3    Ca    8.7      19 Jan 2023 07:28  Mg     1.9     01-19    CAPILLARY BLOOD GLUCOSE  POCT Blood Glucose.: 188 mg/dL (19 Jan 2023 11:07)  POCT Blood Glucose.: 135 mg/dL (19 Jan 2023 07:39)  POCT Blood Glucose.: 192 mg/dL (18 Jan 2023 21:46)  POCT Blood Glucose.: 160 mg/dL (18 Jan 2023 18:03)  POCT Blood Glucose.: 177 mg/dL (18 Jan 2023 16:19)    MEDICATIONS  (STANDING):  aspirin enteric coated 81 milliGRAM(s) Oral daily  atorvastatin 80 milliGRAM(s) Oral at bedtime  bacitracin   Ointment 1 Application(s) Topical two times a day  chlorhexidine 2% Cloths 1 Application(s) Topical <User Schedule>  clobetasol 0.05% Cream 1 Application(s) Topical two times a day  clotrimazole 1% Cream 1 Application(s) Topical two times a day  digoxin     Tablet 250 MICROGram(s) Oral daily  DULoxetine 30 milliGRAM(s) Oral <User Schedule>  enoxaparin Injectable 40 milliGRAM(s) SubCutaneous every 24 hours  glucagon  Injectable 1 milliGRAM(s) IntraMuscular once  insulin lispro (ADMELOG) corrective regimen sliding scale   SubCutaneous three times a day before meals  latanoprost 0.005% Ophthalmic Solution 1 Drop(s) Both EYES at bedtime  levothyroxine 25 MICROGram(s) Oral daily  metoprolol succinate ER 25 milliGRAM(s) Oral daily  multivitamin 1 Tablet(s) Oral daily  mupirocin 2% Ointment 1 Application(s) Topical two times a day  naloxone Injectable 1 milliGRAM(s) IV Push once  pantoprazole    Tablet 40 milliGRAM(s) Oral before breakfast  prasugrel 10 milliGRAM(s) Oral daily    MEDICATIONS  (PRN):  oxycodone    5 mG/acetaminophen 325 mG 1 Tablet(s) Oral every 8 hours PRN Moderate Pain (4 - 6)      PHYSICAL EXAM:  General: Pt lying in bed, on constant observation, cooperative  Skin: LLE- scattered pustular crusting to anterior lower leg. Partial thickness wound to distal 2nd digit of L foot. Area of dry crusting to L heel. No active bleeding, drainage or surrounding erythema.     +Dressing applied to wounds of LLE, patient tolerated well Patient is a 67y old  Male who presents with a chief complaint of recurrent cellulitis (19 Jan 2023 10:47)  AM ROUNDS    Vital Signs Last 24 Hrs  T(C): 36.1 (19 Jan 2023 13:39), Max: 36.7 (18 Jan 2023 20:11)  T(F): 96.9 (19 Jan 2023 13:39), Max: 98 (18 Jan 2023 20:11)  HR: 88 (19 Jan 2023 13:39) (88 - 94)  BP: 124/72 (19 Jan 2023 13:39) (120/71 - 126/82)  RR: 19 (19 Jan 2023 13:39) (18 - 19)  SpO2: 98% (19 Jan 2023 09:43) (98% - 98%)    O2 Parameters below as of 19 Jan 2023 09:43  Patient On (Oxygen Delivery Method): room air    LABS:                        11.1   8.80  )-----------( 379      ( 19 Jan 2023 07:28 )             39.2     01-19    134<L>  |  99  |  40<H>  ----------------------------<  143<H>  4.7   |  25  |  1.3    Ca    8.7      19 Jan 2023 07:28  Mg     1.9     01-19    CAPILLARY BLOOD GLUCOSE  POCT Blood Glucose.: 188 mg/dL (19 Jan 2023 11:07)  POCT Blood Glucose.: 135 mg/dL (19 Jan 2023 07:39)  POCT Blood Glucose.: 192 mg/dL (18 Jan 2023 21:46)  POCT Blood Glucose.: 160 mg/dL (18 Jan 2023 18:03)  POCT Blood Glucose.: 177 mg/dL (18 Jan 2023 16:19)    MEDICATIONS  (STANDING):  aspirin enteric coated 81 milliGRAM(s) Oral daily  atorvastatin 80 milliGRAM(s) Oral at bedtime  bacitracin   Ointment 1 Application(s) Topical two times a day  chlorhexidine 2% Cloths 1 Application(s) Topical <User Schedule>  clobetasol 0.05% Cream 1 Application(s) Topical two times a day  clotrimazole 1% Cream 1 Application(s) Topical two times a day  digoxin     Tablet 250 MICROGram(s) Oral daily  DULoxetine 30 milliGRAM(s) Oral <User Schedule>  enoxaparin Injectable 40 milliGRAM(s) SubCutaneous every 24 hours  glucagon  Injectable 1 milliGRAM(s) IntraMuscular once  insulin lispro (ADMELOG) corrective regimen sliding scale   SubCutaneous three times a day before meals  latanoprost 0.005% Ophthalmic Solution 1 Drop(s) Both EYES at bedtime  levothyroxine 25 MICROGram(s) Oral daily  metoprolol succinate ER 25 milliGRAM(s) Oral daily  multivitamin 1 Tablet(s) Oral daily  mupirocin 2% Ointment 1 Application(s) Topical two times a day  naloxone Injectable 1 milliGRAM(s) IV Push once  pantoprazole    Tablet 40 milliGRAM(s) Oral before breakfast  prasugrel 10 milliGRAM(s) Oral daily    MEDICATIONS  (PRN):  oxycodone    5 mG/acetaminophen 325 mG 1 Tablet(s) Oral every 8 hours PRN Moderate Pain (4 - 6)      PHYSICAL EXAM:  General: Pt lying in bed, on constant observation, cooperative  Skin: LLE- scattered wounds 1-3 cm  with brown adherent eschar and 1 area of crusting with purulence expressed;  crusting to anterior lower leg. Partial thickness wound to CDP distal 2nd digit of L foot. Area of dry crusting to L heel. No active bleeding, drainage or surrounding erythema.     +Dressing applied to wounds of LLE after cleaning , patient tolerated well

## 2023-01-19 NOTE — PROGRESS NOTE ADULT - ATTENDING COMMENTS
***My note supersedes any discrepancies that may be above in the resident's assessment and plan***    66 yo M w HTN, HLD, CAD/MI s/p CABG/VERONICA, severe HF/ICM s/p AICD, Uncontrolled DM w peripheral neuropathy, R AKA, MRSA bacteremia, ?Psoriasis (on topical steroids), L ankle surgery with fixation hardware presents for recurrent cellulitis.     # Recurrent LLE Cellulitis in setting of mrsa bacteremia and numerous joint infections in past - not septic on admission  # Scaling rash b/l UE and LLE   - LLE erythematous with ulcers/abrasions/bullae c/f superimposed cellulitus   - Dermatology eval today- per verbal discussion, does not appear to be psoriasis, but would start high intensity steroids such as Clobetasol   - ID following: c/w IV vanc and was on cefepime for 1 day which is switched to CFTX due to neurosx   - BCX negative  - Derm recs:         Tinea pedis: agree to continue using a topical anti-fungal like clotrimazole cream bid long term          Ulcer on the 2nd toe: mupirocin ointment bid and keep covered with telfa/ bandage          Erosions and excoriations on the blt hands and LLE can also be treated with topical antibiotics such as mupirocin ointment bid.          Psoriasis: topical steroid ointment like triamcinolone bid and antihistamines like loratidine or cetirizine           Tense blisters with clear liquid on left lower extremity are more likely be secondary to possible Bullous Pemphigoid or diabetes, with unlikely                contribution from psoriasis, but triamcinolone ointment bid can also be used on them when they are itchy. A biopsy would not be indicated              at this time considering the mental status of the patient   - ID following        No ABx        F/u with vascular Sx    #AMS/Agitation   # Delirium  - wax/wanes attentiveness  - likely multifactorial: acute illness vs hospital acquired   - CTH and EEG unremarkable   - required IM Haldol 1/16   - As per Neuro: repeat EEG   - delirium precautions:      1. ensure proper sleep wake cycle      2. frequent ortientation      3. minimize tethering and restraints      4. avoid benzos, antihistamines, anti cholinergics, and opioids    # HTN / HLD  # H/o CAD/MI s/p CABG/VERONICA  # Severe HF/ICM s/p AICD, euvolemic on exam  - c/w home asa, statin, digoxin, effient, toprol; takes lasix prn for swelling  - c/w outpatient follow up with Dr. Lopez    # Elevated troponin, stable  - no active chest pain    # Uncontrolled DM ( last A1c 12) formerly on insulin pump  #Hypoglycemia   - HbA1c: 9.8%  - AMS 2/2 hypoglycemia this admission   - As per endo: Start Lantus 15 u once FS persistently > 180 and once eating better can start admelog 4 units TIDPC , hold if npo or not eating more then 50% of meal     # Hypothyroidism  - c/w home synthroid  - TSH 5.61, follow up T4     # GERD  - c/w home PPI    # Depression/Anxiety  - c/w home duloxetine 30mg TID    # Restless leg syndrome  - on valium 2mg bid prn at home- held here     # Microcytic Anemia  - f/u iron panel and monitor cbc    # H/o Diverticulitis and Celiac Disease  - Nutrition f/u and vitamin replacement prn    #DVT PPX: LVX 40 QD  #GI PPX: PPI QD     #Progress Note Handoff  Pending (specify):  burn evaluation  Family discussion: updated at bedside  Disposition:  Unknown at this time________ .
***My note supersedes any discrepancies that may be above in the resident's assessment and plan***    68 yo M w HTN, HLD, CAD/MI s/p CABG/VERONICA, severe HF/ICM s/p AICD, Uncontrolled DM w peripheral neuropathy, R AKA, MRSA bacteremia, ?Psoriasis (on topical steroids), L ankle surgery with fixation hardware presents for recurrent cellulitis.     # Recurrent LLE Cellulitis in setting of mrsa bacteremia and numerous joint infections in past - not septic on admission  # Scaling rash b/l UE and LLE   - LLE erythematous with ulcers/abrasions/bullae c/f superimposed cellulitus   - Dermatology eval today- per verbal discussion, does not appear to be psoriasis, but would start high intensity steroids such as Clobetasol   - ID following: c/w IV vanc and was on cefepime for 1 day which is switched to CFTX due to neurosx   - BCX negative  - Derm recs:  -> Tinea pedis: agree to continue using a topical anti-fungal like clotrimazole cream bid long term to decrease the chances of any tinea contribution to skin integrity in the context of recurrent cellulitis.   -> Ulcer on the 2nd toe: mupirocin ointment bid and keep covered with telfa/ bandage to protect skin from any trauma  -> Erosions and excoriations on the blt hands and LLE can also be treated with topical antibiotics such as mupirocin ointment bid.   -> Psoriasis: while no active lesions are present to suggest psoriasis at this time, a topical steroid ointment like triamcinolone bid and antihistamines like loratidine or cetirizine can help with both itching and the lichenified dry patches on LLE and hands; moisturizers can be used for xerosis/ dry skin: patient should also use soaps and moisturizers for people with sensitive skin like Aveeno or Cetaphil after discharge.   -> Tense blisters with clear liquid on left lower extremity are more likely be secondary to possible Bullous Pemphigoid or diabetes, with unlikely contribution from psoriasis, but triamcinolone ointment bid can also be used on them when they are itchy. A biopsy would not be indicated at this time considering the mental status of the patient     #AMS/Agitation   - initially thought to be 2/2 hypoglycemia - but this has since resolved and pt still agitated   - CTH and EEG unremarkable   - required IM Haldol 1/16   - As per Neuro: repeat EEG     # HTN / HLD  # H/o CAD/MI s/p CABG/VERONICA  # Severe HF/ICM s/p AICD, euvolemic on exam  - c/w home asa, statin, digoxin, effient, toprol; takes lasix prn for swelling  - c/w outpatient follow up with Dr. Lopez    # Elevated troponin, stable  - no active chest pain    # Uncontrolled DM ( last A1c 12) formerly on insulin pump  #Hypoglycemia   - HbA1c: 9.8%  - AMS 2/2 hypoglycemia this admission   - As per endo: Start Lantus 15 u once FS persistently > 180 and once eating better can start admelog 4 units TIDPC , hold if npo or not eating more then 50% of meal     # Hypothyroidism  - c/w home synthroid  - TSH 5.61, follow up T4     # GERD  - c/w home PPI    # Depression/Anxiety  - c/w home duloxetine 30mg TID    # Restless leg syndrome  - on valium 2mg bid prn at home- held here     # Microcytic Anemia  - f/u iron panel and monitor cbc    # H/o Diverticulitis and Celiac Disease  - Nutrition f/u and vitamin replacement prn    #DVT PPX: LVX 40 QD  #GI PPX: PPI QD     #Progress Note Handoff  Pending (specify):  clinical improvement  Family discussion: updated at bedside  Disposition:  Unknown at this time________
66 yo M w HTN, HLD, CAD/MI s/p CABG/VERONICA, severe HF/ICM s/p AICD, Uncontrolled DM w peripheral neuropathy, R AKA, MRSA bacteremia, ?Psoriasis (on topical steroids), L ankle surgery with fixation hardware presents for recurrent cellulitis.     # Recurrent LLE Cellulitis in setting of mrsa bacteremia and numerous joint infections in past - not septic on admission  # Scaling rash b/l UE and LLE   - LLE erythematous with ulcers/abrasions/bullae c/f superimposed cellulitus   - Dermatology eval today- per verbal discussion, does not appear to be psoriasis, but would start high intensity steroids such as Clobetasol   - ID following: c/w IV vanc and cefepime  - BCX negative  - follow up ID for Abx duration     #AMS/Agitation   - initially thought to be 2/2 hypoglycemia - but this has since resolved and pt still agitated   - CTH and EEG unremarkable   - required IM Haldol 1/16   - Appreciate Neuro eval     # HTN / HLD  # H/o CAD/MI s/p CABG/VERONICA  # Severe HF/ICM s/p AICD, euvolemic on exam  - c/w home asa, statin, digoxin, effient, toprol; takes lasix prn for swelling  - c/w outpatient follow up with Dr. Lopez    # Elevated troponin, stable  - no active chest pain    # Uncontrolled DM ( last A1c 12) formerly on insulin pump  #Hypoglycemia   - HbA1c: 9.8%  - AMS 2/2 hypoglycemia this admission   - Endocrine consulted, will hold basal bolus until blood glucose consistently above 180     # Hypothyroidism  - c/w home synthroid  - TSH 5.61, follow up T4     # GERD  - c/w home PPI    # Depression/Anxiety  - c/w home duloxetine 30mg TID    # Restless leg syndrome  - on valium 2mg bid prn at home- held here     # Microcytic Anemia  - f/u iron panel and monitor cbc    # H/o Diverticulitis and Celiac Disease  - Nutrition f/u and vitamin replacement prn    Dispo: acute due to change in mental status, neuro eval pending     #AMS/ agitation- s/p haldol dose, neuro consult placed   #Scaling Rash- started on Clobetasol as per Dermatology reccs   #Hypoglycemia- stopped basal bolus per Endo reccs     Total time spent to complete patient's bedside assessment, review medical chart, discuss medical plan of care with covering medical team was more than 50 minutes  with >50% of time spent face to face with patient, discussion with patient/family and/or coordination of care      Krysten Ohara DO
66 yo M w HTN, HLD, CAD/MI s/p CABG/VERONICA, severe HF/ICM s/p AICD, Uncontrolled DM w peripheral neuropathy, R AKA, MRSA bacteremia, ?Psoriasis (on topical steroids), L ankle surgery with fixation hardware presents for recurrent cellulitis.     # Recurrent LLE Cellulitis in setting of mrsa bacteremia and numerous joint infections in past - not septic on admission  - LLE erythematous with ulcers/abrasions/bullae c/f superimposed cellulitus   - ID following: c/w IV vanc and cefepime  - BCX negative  - follow up ID for Abx duration   - Derm reccs noted below     #AMS/Agitation ( since 1/13 )  - initially thought to be 2/2 hypoglycemia - but this has since resolved and pt still agitated   - CTH and EEG unremarkable   - has been requiring IM and PO Haldol  - Appreciate Neuro eval - will dc Cefepime ( although this was only just started on 1/16 ), and will check NH3 level. Appreciate attending eval.     #Dermatology Reccomendations:   #Tinea pedis: agree to continue using a topical anti-fungal like clotrimazole cream bid long term to decrease the chances of any tinea contribution to skin integrity in the context of recurrent cellulitis.   #Ulcer on the 2nd toe: mupirocin ointment bid and keep covered with telfa/ bandage to protect skin from any trauma  #Erosions and excoriations on the blt hands and LLE can also be treated with topical antibiotics such as mupirocin ointment bid.   #Psoriasis: while no active lesions are present to suggest psoriasis at this time, a topical steroid ointment like triamcinolone bid and antihistamines like loratidine or cetirizine can help with both itching and the lichenified dry patches on LLE and hands; moisturizers can be used for xerosis/ dry skin: patient should also use soaps and moisturizers for people with sensitive skin like Aveeno or Cetaphil after discharge.   # Tense blisters with clear liquid on left lower extremity are more likely be secondary to possible Bullous Pemphigoid or diabetes, with unlikely contribution from psoriasis, but triamcinolone ointment bid can also be used on them when they are itchy. A biopsy would not be indicated at this time considering the mental status of the patient    ****Patient advised to stop scratching but said that it’s difficult for him not to scratch at times; it is advisable to continue keeping the itchy areas on dorsal hands and feet overed/ wrapped and clipping the sharp/ broken fingernails to minimize scratching and trauma to the skin;    # HTN / HLD  # H/o CAD/MI s/p CABG/VERONICA  # Severe HF/ICM s/p AICD, euvolemic on exam  - c/w home asa, statin, digoxin, effient, toprol; takes lasix prn for swelling  - c/w outpatient follow up with Dr. Lopez    # Elevated troponin, stable  - no active chest pain    # Uncontrolled DM ( last A1c 12) formerly on insulin pump  #Hypoglycemia   - HbA1c: 9.8%  - AMS 2/2 hypoglycemia this admission   - Endocrine consulted, will hold basal bolus until blood glucose consistently above 180     # Hypothyroidism  - c/w home synthroid  - TSH 5.61, follow up T4     # GERD  - c/w home PPI    # Depression/Anxiety  - c/w home duloxetine 30mg TID    # Restless leg syndrome  - on valium 2mg bid prn at home- held here     # Microcytic Anemia  - f/u iron panel and monitor cbc    # H/o Diverticulitis and Celiac Disease  - Nutrition f/u and vitamin replacement prn    Dispo: pending improvement in AMS, and neuro attending reccs       Total time spent to complete patient's bedside assessment, review medical chart, discuss medical plan of care with covering medical team was more than 35 minutes  with >50% of time spent face to face with patient, discussion with patient/family and/or coordination of care      Krysten Ohara DO .

## 2023-01-19 NOTE — DISCHARGE NOTE NURSING/CASE MANAGEMENT/SOCIAL WORK - PATIENT PORTAL LINK FT
You can access the FollowMyHealth Patient Portal offered by [facility] by registering at the following website: [email or web address]. By joining [employer]’s FollowMyHealth portal, you will also be able to view your health information using other applications (apps) compatible with our system.

## 2023-01-19 NOTE — CONSULT NOTE ADULT - NS ATTEND AMEND GEN_ALL_CORE FT
As above patient seen -  consultation requested for left lower extremity cellulitis    Chart reviewed patient examined  Findings as above multiple wounds of the anterior left leg dorsum of the foot and second toe  Most of the wounds are 1 to 2 weeks centimeters with a crusting or adherent brown eschar ;   small wound with purulence underneath crusting and mild surrounding erythema     Wounds cleaned and dressings applied as above  Recommend hydrogel  No surgical intervention at this time  Antibiotics as indicated dictated by team As above patient seen -  consultation requested for left lower extremity cellulitis    Chart reviewed patient examined  Findings as above - multiple wounds of the anterior left leg,  dorsum of the foot and second toe  Most of the wounds are 1 to 2 cm with crusting or adherent brown eschar ;   small wound with purulence underneath crusting and mild surrounding erythema     Wounds cleaned and dressings applied as above  Recommend hydrogel  No surgical intervention at this time  Antibiotics as indicated / dictated by primary team

## 2023-01-19 NOTE — CHART NOTE - NSCHARTNOTEFT_GEN_A_CORE
68 yo M w HTN, HLD, CAD/MI s/p CABG/VERONICA, severe HF/ICM s/p AICD, Uncontrolled DM w peripheral neuropathy, R AKA, MRSA bacteremia, Psoriasis (on topical steroids), L ankle surgery with fixation hardware.   Patient requires the head of bed to be elevated more than thirty degrees most of the time due to heart failure. Patient also requires frequent positioning of the body in ways not feasible with ordinary bed due to AKA to alleviate pain. Pillows and wedges have been considered and ruled out. 66 yo M w HTN, HLD, CAD/MI s/p CABG/VERONICA, severe HF/ICM s/p AICD, Uncontrolled DM w peripheral neuropathy, R AKA, MRSA bacteremia, Psoriasis (on topical steroids), L ankle surgery with fixation hardware.   Patient requires the head of bed to be elevated more than thirty degrees most of the time due to heart failure. Patient also requires frequent positioning of the body in ways not feasible with ordinary bed due to AKA to alleviate pain. Pillows and wedges have been considered and ruled out.    Patient will also need a gel overlay to help with healing of current skin breakdown and to prevent further skin breakdown and to prevent further skin breakdown. It is medically necessary for patient to receive gel overlay due to his medical diagnosis.

## 2023-01-19 NOTE — CONSULT NOTE ADULT - ASSESSMENT
#LLE multiple wounds    - No burn surgical intervention at this time  - local wound care BID  - wash with soap and water, apply hydrogel to crusted areas, cover with xeroform, wrap with kerlix, secure with tape  - IV antibiotics as indicated  - ensure adequate hydration/nutrition  - signing off, recall prn

## 2023-01-19 NOTE — PROGRESS NOTE ADULT - SUBJECTIVE AND OBJECTIVE BOX
FLOWER MARISCAL  67y, Male    All available historical data reviewed    OVERNIGHT EVENTS:  pain left foot  SOB    ROS:  General: Denies rigors, nightsweats  HEENT: Denies headache, rhinorrhea, sore throat, eye pain  CV: Denies CP, palpitations  PULM: Denies wheezing, hemoptysis  GI: Denies hematemesis, hematochezia, melena  : Denies discharge, hematuria  MSK: Denies arthralgias, myalgias  SKIN: Denies rash, lesions  NEURO: Denies paresthesias, weakness  PSYCH: Denies depression, anxiety    VITALS:  T(F): 96.4, Max: 98 (01-18-23 @ 20:11)  HR: 94  BP: 126/82  RR: 18Vital Signs Last 24 Hrs  T(C): 35.8 (19 Jan 2023 05:03), Max: 36.7 (18 Jan 2023 20:11)  T(F): 96.4 (19 Jan 2023 05:03), Max: 98 (18 Jan 2023 20:11)  HR: 94 (19 Jan 2023 05:03) (88 - 94)  BP: 126/82 (19 Jan 2023 05:03) (120/71 - 127/84)  BP(mean): --  RR: 18 (19 Jan 2023 05:03) (18 - 18)  SpO2: --        TESTS & MEASUREMENTS:              Culture - Fungal, Blood (collected 01-13-23 @ 22:39)  Source: .Blood Blood  Preliminary Report (01-18-23 @ 15:03):    Culture is being performed. Fungal cultures are held for 4 weeks.    Culture - Blood (collected 01-13-23 @ 19:11)  Source: .Blood Blood  Final Report (01-18-23 @ 23:00):    No Growth Final            RADIOLOGY & ADDITIONAL TESTS:  Personal review of radiological diagnostics performed  Echo and EKG results noted when applicable.     MEDICATIONS:  aspirin enteric coated 81 milliGRAM(s) Oral daily  atorvastatin 80 milliGRAM(s) Oral at bedtime  bacitracin   Ointment 1 Application(s) Topical two times a day  cefTRIAXone   IVPB 2000 milliGRAM(s) IV Intermittent every 24 hours  chlorhexidine 2% Cloths 1 Application(s) Topical <User Schedule>  clobetasol 0.05% Cream 1 Application(s) Topical two times a day  clotrimazole 1% Cream 1 Application(s) Topical two times a day  digoxin     Tablet 250 MICROGram(s) Oral daily  DULoxetine 30 milliGRAM(s) Oral <User Schedule>  enoxaparin Injectable 40 milliGRAM(s) SubCutaneous every 24 hours  glucagon  Injectable 1 milliGRAM(s) IntraMuscular once  insulin lispro (ADMELOG) corrective regimen sliding scale   SubCutaneous three times a day before meals  latanoprost 0.005% Ophthalmic Solution 1 Drop(s) Both EYES at bedtime  levothyroxine 25 MICROGram(s) Oral daily  metoprolol succinate ER 25 milliGRAM(s) Oral daily  multivitamin 1 Tablet(s) Oral daily  mupirocin 2% Ointment 1 Application(s) Topical two times a day  naloxone Injectable 1 milliGRAM(s) IV Push once  oxycodone    5 mG/acetaminophen 325 mG 1 Tablet(s) Oral every 8 hours PRN  pantoprazole    Tablet 40 milliGRAM(s) Oral before breakfast  prasugrel 10 milliGRAM(s) Oral daily  vancomycin  IVPB      vancomycin  IVPB 1250 milliGRAM(s) IV Intermittent every 24 hours      ANTIBIOTICS:  cefTRIAXone   IVPB 2000 milliGRAM(s) IV Intermittent every 24 hours  vancomycin  IVPB      vancomycin  IVPB 1250 milliGRAM(s) IV Intermittent every 24 hours

## 2023-01-19 NOTE — DISCHARGE NOTE NURSING/CASE MANAGEMENT/SOCIAL WORK - NSDCPEFALRISK_GEN_ALL_CORE
For information on Fall & Injury Prevention, visit: [email or web address] OR  [email or web address] OR  https://www.cdc.gov/steadi/patient.html

## 2023-01-19 NOTE — PROGRESS NOTE ADULT - SUBJECTIVE AND OBJECTIVE BOX
FLOWER MARISCAL 67y Male  MRN#: [id number]   Hospital Day: 8d    HPI:  68 yo M w HTN, HLD, CAD/MI s/p CABG/VERONICA, severe HF/ICM s/p AICD, Uncontrolled DM w peripheral neuropathy, R AKA, MRSA bacteremia, Psoriasis (on topical steroids), L ankle surgery with fixation hardware presents for worsening rash and tenderness over bilateral hand and left LE associated with redness and drainage over past week. Patient says the leg has been gradually worsening over the last few days. He is also experiencing b/l wrist swelling and pain.    Of note, patieint had a R TKA several years ago c/b recurrent PJI and multiple revisions  in 09/2020 by Dr. 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 Patient also w  history of acute cholecystitis s/p perc sudhir 2/28/20 (drain removed in late May 2020) c/b by cholecystocutaneous excision of cholecystocutaneous fistulous tract with Dr. King in 04/08/2021 and eventual elective resection of fistula tract and partial cholecystectomy in 06/2022.     In ED, T 98.4, /86, HR 77, RR 17, SpO2 99; Labs significant for Hgb 11.8 w MCV 64.7 and increased RDW, Trop .06, BNP 9402 and VBG pCO2 65  Patient started on ABX and admitted for further evaluation. (11 Jan 2023 03:35)    SUBJECTIVE  Patient is a 67y old Male who presents with a chief complaint of recurrent cellulitis (19 Jan 2023 07:50)  Currently admitted to medicine with the primary diagnosis of Cellulitis    INTERVAL HPI AND OVERNIGHT EVENTS:  Patient was examined and seen at bedside. This morning he is resting comfortably in bed and reports no issues or overnight events.    VITAL SIGNS: Last 24 Hours  T(C): 35.8 (19 Jan 2023 05:03), Max: 36.7 (18 Jan 2023 20:11)  T(F): 96.4 (19 Jan 2023 05:03), Max: 98 (18 Jan 2023 20:11)  HR: 94 (19 Jan 2023 05:03) (88 - 94)  BP: 126/82 (19 Jan 2023 05:03) (120/71 - 127/84)  BP(mean): --  RR: 19 (19 Jan 2023 09:43) (18 - 19)  SpO2: 98% (19 Jan 2023 09:43) (98% - 98%)    LABS:                        11.1   8.80  )-----------( 379      ( 19 Jan 2023 07:28 )             39.2     01-19    134<L>  |  99  |  40<H>  ----------------------------<  143<H>  4.7   |  25  |  1.3    Ca    8.7      19 Jan 2023 07:28  Mg     1.9     01-19    PHYSICAL EXAM:  CONSTITUTIONAL: confused agitated   HEAD: Atraumatic, normocephalic  EYES: EOM intact, PERRLA, conjunctiva and sclera clear  ENT: Supple, no masses, no thyromegaly, no bruits, no JVD; moist mucous membranes  PULMONARY: Clear to auscultation bilaterally; no wheezes, rales, or rhonchi  CARDIOVASCULAR: Regular rate and rhythm; no murmurs, rubs, or gallops  GASTROINTESTINAL: Soft, non-tender, non-distended; bowel sounds present  MUSCULOSKELETAL: 2+ peripheral pulses; no clubbing, no cyanosis, no edema  NEUROLOGY: AAOx2  SKIN: Psoriatic lesions over bilat hands, LLE cellulitis, tenia pedis

## 2023-01-19 NOTE — PROGRESS NOTE ADULT - ASSESSMENT
66 yo M w HTN, HLD, CAD/MI s/p CABG/VERONICA, severe HF/ICM s/p AICD, Uncontrolled DM w peripheral neuropathy, R AKA, MRSA bacteremia, ?Psoriasis (on topical steroids), L ankle surgery with fixation hardware presents for recurrent cellulitis.     IMPRESSION;  LLE with significant PAD with ischemic changes  No cellulitis/abscess. No infection    RECOMMENDATIONS;  No ABx  F/u with vascular Sx  Please do not hesitate to recall ID if any questions arise either through my [employer] or through [employer]

## 2023-01-19 NOTE — PROGRESS NOTE ADULT - ASSESSMENT
66 yo M w HTN, HLD, CAD/MI s/p CABG/VERONICA, severe HF/ICM s/p AICD, Uncontrolled DM w peripheral neuropathy, R AKA, MRSA bacteremia, ?Psoriasis (on topical steroids), L ankle surgery with fixation hardware presents for recurrent cellulitis.     # Recurrent LLE Cellulitis in setting of mrsa bacteremia and numerous joint infections in past - not septic on admission  # Scaling rash b/l UE and LLE   - LLE erythematous with ulcers/abrasions/bullae c/f superimposed cellulitus   - Dermatology eval today- per verbal discussion, does not appear to be psoriasis, but would start high intensity steroids such as Clobetasol   - ID following: D/c all antibiotics, there is no sign of cellulitis or other infection   - BCX negative`  - Derm recs:  -> Tinea pedis: agree to continue using a topical anti-fungal like clotrimazole cream bid long term to decrease the chances of any tinea contribution to skin integrity in the context of recurrent cellulitis.   -> Ulcer on the 2nd toe: mupirocin ointment bid and keep covered with telfa/ bandage to protect skin from any trauma  -> Erosions and excoriations on the blt hands and LLE can also be treated with topical antibiotics such as mupirocin ointment bid.   -> Psoriasis: while no active lesions are present to suggest psoriasis at this time, a topical steroid ointment like triamcinolone bid and antihistamines like loratidine or cetirizine can help with both itching and the lichenified dry patches on LLE and hands; moisturizers can be used for xerosis/ dry skin: patient should also use soaps and moisturizers for people with sensitive skin like Aveeno or Cetaphil after discharge.   -> Tense blisters with clear liquid on left lower extremity are more likely be secondary to possible Bullous Pemphigoid or diabetes, with unlikely contribution from psoriasis, but triamcinolone ointment bid can also be used on them when they are itchy. A biopsy would not be indicated at this time considering the mental status of the patient   - Pending Burn consult    #AMS/Agitation   - initially thought to be 2/2 hypoglycemia - but this has since resolved and pt still agitated   - CTH and EEG unremarkable   - required IM Haldol 1/16   - As per Neuro: repeat EEG --> pending     # HTN / HLD  # H/o CAD/MI s/p CABG/VERONICA  # Severe HF/ICM s/p AICD, euvolemic on exam  - c/w home asa, statin, digoxin, effient, toprol; takes lasix prn for swelling  - c/w outpatient follow up with Dr. Lopez    # Elevated troponin, stable  - no active chest pain    # Uncontrolled DM ( last A1c 12) formerly on insulin pump  #Hypoglycemia   - HbA1c: 9.8%  - AMS 2/2 hypoglycemia this admission   - As per endo: Start Lantus 15 u once FS persistently > 180 and once eating better can start admelog 4 units TIDPC , hold if npo or not eating more then 50% of meal      # Hypothyroidism  - c/w home synthroid  - TSH 5.61, T4: 1.1     # GERD  - c/w home PPI    # Depression/Anxiety  - c/w home duloxetine 30mg TID    # Restless leg syndrome  - on valium 2mg bid prn at home- held here     # Microcytic Anemia  - Iron: 27   - Saturation: 8 %  - Total Binding capacity: 342     # H/o Diverticulitis and Celiac Disease  - Nutrition f/u and vitamin replacement prn    #DVT PPX: LVX 40 QD  #GI PPX: PPI QD    68 yo M w HTN, HLD, CAD/MI s/p CABG/VERONICA, severe HF/ICM s/p AICD, Uncontrolled DM w peripheral neuropathy, R AKA, MRSA bacteremia, ?Psoriasis (on topical steroids), L ankle surgery with fixation hardware presents for recurrent cellulitis.     # Recurrent LLE Cellulitis in setting of mrsa bacteremia and numerous joint infections in past - not septic on admission  # Scaling rash b/l UE and LLE   - LLE erythematous with ulcers/abrasions/bullae c/f superimposed cellulitus   - Dermatology eval today- per verbal discussion, does not appear to be psoriasis, but would start high intensity steroids such as Clobetasol   - ID following: D/c all antibiotics, there is no sign of cellulitis or other infection   - BCX negative`  - Derm recs:  -> Tinea pedis: agree to continue using a topical anti-fungal like clotrimazole cream bid long term to decrease the chances of any tinea contribution to skin integrity in the context of recurrent cellulitis.   -> Ulcer on the 2nd toe: mupirocin ointment bid and keep covered with telfa/ bandage to protect skin from any trauma  -> Erosions and excoriations on the blt hands and LLE can also be treated with topical antibiotics such as mupirocin ointment bid.   -> Psoriasis: while no active lesions are present to suggest psoriasis at this time, a topical steroid ointment like triamcinolone bid and antihistamines like loratidine or cetirizine can help with both itching and the lichenified dry patches on LLE and hands; moisturizers can be used for xerosis/ dry skin: patient should also use soaps and moisturizers for people with sensitive skin like Aveeno or Cetaphil after discharge.   -> Tense blisters with clear liquid on left lower extremity are more likely be secondary to possible Bullous Pemphigoid or diabetes, with unlikely contribution from psoriasis, but triamcinolone ointment bid can also be used on them when they are itchy. A biopsy would not be indicated at this time considering the mental status of the patient   - Burn consult: No burn surgical intervention at this time, local wound care BID, wash with soap and water, apply hydrogel to crusted areas, cover with xeroform, wrap with kerlix, secure with tape      #AMS/Agitation   - initially thought to be 2/2 hypoglycemia - but this has since resolved and pt still agitated   - CTH and EEG unremarkable   - required IM Haldol 1/16   - As per Neuro: repeat EEG --> pending     # HTN / HLD  # H/o CAD/MI s/p CABG/VERONICA  # Severe HF/ICM s/p AICD, euvolemic on exam  - c/w home asa, statin, digoxin, effient, toprol; takes lasix prn for swelling  - c/w outpatient follow up with Dr. Lopez    # Elevated troponin, stable  - no active chest pain    # Uncontrolled DM ( last A1c 12) formerly on insulin pump  #Hypoglycemia   - HbA1c: 9.8%  - AMS 2/2 hypoglycemia this admission   - As per endo: Start Lantus 15 u once FS persistently > 180 and once eating better can start admelog 4 units TIDPC , hold if npo or not eating more then 50% of meal      # Hypothyroidism  - c/w home synthroid  - TSH 5.61, T4: 1.1     # GERD  - c/w home PPI    # Depression/Anxiety  - c/w home duloxetine 30mg TID    # Restless leg syndrome  - on valium 2mg bid prn at home- held here     # Microcytic Anemia  - Iron: 27   - Saturation: 8 %  - Total Binding capacity: 342     # H/o Diverticulitis and Celiac Disease  - Nutrition f/u and vitamin replacement prn    #DVT PPX: LVX 40 QD  #GI PPX: PPI QD

## 2023-01-20 NOTE — DISCHARGE NOTE PROVIDER - NSDCMRMEDTOKEN_GEN_ALL_CORE_FT
aspirin 81 mg oral delayed release tablet: 1 tab(s) orally once a day  diazePAM 2 mg oral tablet: 1 tab(s) orally 2 times a day, As Needed  digoxin 250 mcg (0.25 mg) oral tablet: 1 tab(s) orally once a day  DULoxetine 30 mg oral delayed release capsule: 1 cap(s) orally 3 times a day  HumaLOG 100 units/mL injectable solution: 30 unit(s) injectable 3 times a day (with meals)  Lantus 100 units/mL subcutaneous solution: 60 unit(s) subcutaneous once a day  Lasix 40 mg oral tablet: 1 tab(s) orally once a day, As Needed  levothyroxine 25 mcg (0.025 mg) oral tablet: 1 tab(s) orally once a day  metoprolol succinate 25 mg oral tablet, extended release: 1 tab(s) orally once a day  Multiple Vitamins oral tablet: 1 tab(s) orally once a day  pantoprazole 40 mg oral delayed release tablet: 1 tab(s) orally once a day (before a meal)  prasugrel 10 mg oral tablet: 1 tab(s) orally once a day  rosuvastatin 40 mg oral tablet: 1 tab(s) orally once a day   aspirin 81 mg oral delayed release tablet: 1 tab(s) orally once a day  digoxin 250 mcg (0.25 mg) oral tablet: 1 tab(s) orally once a day  DULoxetine 30 mg oral delayed release capsule: 1 cap(s) orally 3 times a day  levothyroxine 25 mcg (0.025 mg) oral tablet: 1 tab(s) orally once a day  metoprolol succinate 25 mg oral tablet, extended release: 1 tab(s) orally once a day  Multiple Vitamins oral tablet: 1 tab(s) orally once a day  pantoprazole 40 mg oral delayed release tablet: 1 tab(s) orally once a day (before a meal)  prasugrel 10 mg oral tablet: 1 tab(s) orally once a day  rosuvastatin 40 mg oral tablet: 1 tab(s) orally once a day   aspirin 81 mg oral delayed release tablet: 1 tab(s) orally once a day  digoxin 125 mcg (0.125 mg) oral tablet: 1 tab(s) orally once a day  DULoxetine 30 mg oral delayed release capsule: 1 cap(s) orally 3 times a day  insulin glargine 100 units/mL subcutaneous solution: 10 unit(s) subcutaneous once a day (at bedtime)  insulin lispro 100 units/mL injectable solution: if your finger stick is 150-199 please inject 2 units  if your finger stick is 200-250 please inject 4 units  if your finger stick is 251-300 please inject 6 units  if your finger stick is 301-350 please inject 8 units  if your finger stick is more than 350 please call your physician     Lasix 40 mg oral tablet: 1 tab(s) orally 2 times a day   levothyroxine 25 mcg (0.025 mg) oral tablet: 1 tab(s) orally once a day  metoprolol succinate 25 mg oral tablet, extended release: 1 tab(s) orally once a day  Multiple Vitamins oral tablet: 1 tab(s) orally once a day  pantoprazole 40 mg oral delayed release tablet: 1 tab(s) orally once a day (before a meal)  prasugrel 10 mg oral tablet: 1 tab(s) orally once a day  rosuvastatin 40 mg oral tablet: 1 tab(s) orally once a day  SEROquel 50 mg oral tablet: 1 tab(s) orally once a day (at bedtime)

## 2023-01-20 NOTE — PROGRESS NOTE ADULT - ASSESSMENT
66 yo M w HTN, HLD, CAD/MI s/p CABG/VERONICA, severe HF/ICM s/p AICD, Uncontrolled DM w peripheral neuropathy, R AKA, MRSA bacteremia, ?Psoriasis (on topical steroids), L ankle surgery with fixation hardware presents for recurrent cellulitis.     # Recurrent LLE Cellulitis in setting of mrsa bacteremia and numerous joint infections in past - not septic on admission  # Scaling rash b/l UE and LLE   - LLE erythematous with ulcers/abrasions/bullae c/f superimposed cellulitus   - Dermatology eval today- per verbal discussion, does not appear to be psoriasis, but would start high intensity steroids such as Clobetasol   - ID following: c/w IV vanc and was on cefepime for 1 day which is switched to CFTX due to neurosx   - BCX negative  - Derm recs:  -> Tinea pedis: agree to continue using a topical anti-fungal like clotrimazole cream bid long term to decrease the chances of any tinea contribution to skin integrity in the context of recurrent cellulitis.   -> Ulcer on the 2nd toe: mupirocin ointment bid and keep covered with telfa/ bandage to protect skin from any trauma  -> Erosions and excoriations on the blt hands and LLE can also be treated with topical antibiotics such as mupirocin ointment bid.   -> Psoriasis: while no active lesions are present to suggest psoriasis at this time, a topical steroid ointment like triamcinolone bid and antihistamines like loratidine or cetirizine can help with both itching and the lichenified dry patches on LLE and hands; moisturizers can be used for xerosis/ dry skin: patient should also use soaps and moisturizers for people with sensitive skin like Aveeno or Cetaphil after discharge.   -> Tense blisters with clear liquid on left lower extremity are more likely be secondary to possible Bullous Pemphigoid or diabetes, with unlikely contribution from psoriasis, but triamcinolone ointment bid can also be used on them when they are itchy. A biopsy would not be indicated at this time considering the mental status of the patient     #AMS/Agitation   #Toxic metabolic encepahlopathy  - initially thought to be 2/2 hypoglycemia - but this has since resolved and pt still confused; likely hospital acquired delirium   - CTH and EEG unremarkable   - required IM Haldol 1/16   - As per Neuro: repeat EEG   - delirium precautions      # HTN / HLD  # H/o CAD/MI s/p CABG/VERONICA  # Severe HF/ICM s/p AICD, euvolemic on exam  - c/w home asa, statin, digoxin, effient, toprol; takes lasix prn for swelling  - c/w outpatient follow up with Dr. Lopez    # Elevated troponin, stable  - no active chest pain    # Uncontrolled DM ( last A1c 12) formerly on insulin pump  #Hypoglycemia   - HbA1c: 9.8%  - AMS 2/2 hypoglycemia this admission   - As per endo: Start Lantus 15 u once FS persistently > 180 and once eating better can start admelog 4 units TIDPC , hold if npo or not eating more then 50% of meal     # Hypothyroidism  - c/w home synthroid  - TSH 5.61, follow up T4     # GERD  - c/w home PPI    # Depression/Anxiety  - c/w home duloxetine 30mg TID    # Restless leg syndrome  - on valium 2mg bid prn at home- held here     # Microcytic Anemia  - iron 27, TIBC 342, sat 8%  - ferrous sulfate 325mg daily    # H/o Diverticulitis and Celiac Disease  - Nutrition f/u and vitamin replacement prn    #DVT PPX: LVX 40 QD  #GI PPX: PPI QD     #Progress Note Handoff  Pending (specify):  cxr, trial of seroquel   Family discussion: updated at bedside  Disposition: anticipate home tomorrow 1/21

## 2023-01-20 NOTE — DISCHARGE NOTE PROVIDER - HOSPITAL COURSE
HPI:  68 yo M w HTN, HLD, CAD/MI s/p CABG/VERONICA, severe HF/ICM s/p AICD, Uncontrolled DM w peripheral neuropathy, R AKA, MRSA bacteremia, Psoriasis (on topical steroids), L ankle surgery with fixation hardware presents for worsening rash and tenderness over bilateral hand and left LE associated with redness and drainage over past week. Patient says the leg has been gradually worsening over the last few days. He is also experiencing b/l wrist swelling and pain.    Of note, patieint had a R TKA several years ago c/b recurrent PJI and multiple revisions  in 09/2020 by Dr. Castro (explant and abx spacer exchange), and most recently transferred to [facility] on 1/6/22  from [facility] for MRSA bacteremia due to recurrent PJI s/p Revision gastroc flap by PRS, antibiotic cement spacer removal, I&D, replacement on 1/6/22, and subsequent OR with vascular on 1/10/22 for right AKA and Closure of MOISES on 1/14/22. Patient also w  history of acute cholecystitis s/p perc sudhir 2/28/20 (drain removed in late May 2020) c/b by cholecystocutaneous excision of cholecystocutaneous fistulous tract with Dr. King in 04/08/2021 and eventual elective resection of fistula tract and partial cholecystectomy in 06/2022.     In ED, T 98.4, /86, HR 77, RR 17, SpO2 99; Labs significant for Hgb 11.8 w MCV 64.7 and increased RDW, Trop .06, BNP 9402 and VBG pCO2 65    Patient started on ABX and admitted for further evaluation. (11 Jan 2023 03:35)    A&P:    # Recurrent LLE Cellulitis in setting of mrsa bacteremia and numerous joint infections in past - not septic on admission  # Scaling rash b/l UE and LLE   - LLE erythematous with ulcers/abrasions/bullae c/f superimposed cellulitus   - ID following: D/c all antibiotics, there is no sign of cellulitis or other infection   - BCX negative`  - Derm recs:  -> Tinea pedis: agree to continue using a topical anti-fungal like clotrimazole cream bid long term to decrease the chances of any tinea contribution to skin integrity in the context of recurrent cellulitis.   -> Ulcer on the 2nd toe: mupirocin ointment bid and keep covered with telfa/ bandage to protect skin from any trauma  -> Erosions and excoriations on the blt hands and LLE can also be treated with topical antibiotics such as mupirocin ointment bid.   -> Psoriasis: while no active lesions are present to suggest psoriasis at this time, a topical steroid ointment like triamcinolone bid and antihistamines like loratidine or cetirizine can help with both itching and the lichenified dry patches on LLE and hands; moisturizers can be used for xerosis/ dry skin: patient should also use soaps and moisturizers for people with sensitive skin like Aveeno or Cetaphil after discharge.   -> Tense blisters with clear liquid on left lower extremity are more likely be secondary to possible Bullous Pemphigoid or diabetes, with unlikely contribution from psoriasis, but triamcinolone ointment bid can also be used on them when they are itchy. A biopsy would not be indicated at this time considering the mental status of the patient   - Burn consult: No burn surgical intervention at this time, local wound care BID, wash with soap and water, apply hydrogel to crusted areas, cover with xeroform, wrap with kerlix, secure with tape      #AMS/Agitation   - initially thought to be 2/2 hypoglycemia - but this has since resolved and pt was still agitated   - CTH and EEG unremarkable   - As per Neuro: repeat EEG: generalized slowing    # HTN / HLD  # H/o CAD/MI s/p CABG/VERONICA  # Severe HF/ICM s/p AICD, euvolemic on exam  - c/w home asa, statin, digoxin, effient, toprol; takes lasix prn for swelling  - c/w outpatient follow up with Dr. Lopez    # Elevated troponin, stable  - no active chest pain    # Uncontrolled DM ( last A1c 12) formerly on insulin pump  #Hypoglycemia   - HbA1c: 9.8%  - AMS 2/2 hypoglycemia this admission   - As per endo: Start Lantus 15 u once FS persistently > 180 and once eating better can start admelog 4 units TIDPC , hold if npo or not eating more then 50% of meal      # Hypothyroidism  - c/w home synthroid  - TSH 5.61, T4: 1.1     # GERD  - c/w home PPI    # Depression/Anxiety  - c/w home duloxetine 30mg TID    # Restless leg syndrome  - on valium 2mg bid prn at home- held here     # Microcytic Anemia  - Iron: 27   - Saturation: 8 %  - Total Binding capacity: 342     # H/o Diverticulitis and Celiac Disease  - Nutrition f/u and vitamin replacement prn    #DVT PPX: LVX 40 QD  #GI PPX: PPI QD      HPI:  66 yo M w HTN, HLD, CAD/MI s/p CABG/VERONICA, severe HF/ICM s/p AICD, Uncontrolled DM w peripheral neuropathy, R AKA, MRSA bacteremia, Psoriasis (on topical steroids), L ankle surgery with fixation hardware presents for worsening rash and tenderness over bilateral hand and left LE associated with redness and drainage over past week. Patient says the leg has been gradually worsening over the last few days. He is also experiencing b/l wrist swelling and pain.    Of note, patieint had a R TKA several years ago c/b recurrent PJI and multiple revisions  in 09/2020 by Dr. Castro (explant and abx spacer exchange), and most recently transferred to [facility] on 1/6/22  from [facility] for MRSA bacteremia due to recurrent PJI s/p Revision gastroc flap by PRS, antibiotic cement spacer removal, I&D, replacement on 1/6/22, and subsequent OR with vascular on 1/10/22 for right AKA and Closure of MOISES on 1/14/22. Patient also w  history of acute cholecystitis s/p perc sudhir 2/28/20 (drain removed in late May 2020) c/b by cholecystocutaneous excision of cholecystocutaneous fistulous tract with Dr. King in 04/08/2021 and eventual elective resection of fistula tract and partial cholecystectomy in 06/2022.     In ED, T 98.4, /86, HR 77, RR 17, SpO2 99; Labs significant for Hgb 11.8 w MCV 64.7 and increased RDW, Trop .06, BNP 9402 and VBG pCO2 65    Patient started on ABX and admitted for further evaluation. (11 Jan 2023 03:35)    A&P:    # Recurrent LLE Cellulitis in setting of mrsa bacteremia and numerous joint infections in past - not septic on admission  # Scaling rash b/l UE and LLE   - LLE erythematous with ulcers/abrasions/bullae c/f superimposed cellulitus   - ID following: D/c all antibiotics, there is no sign of cellulitis or other infection   - BCX negative`  - Derm recs:  -> Tinea pedis: topical anti-fungal like clotrimazole cream bid long term to decrease the chances of any tinea contribution to skin integrity in the context of recurrent cellulitis.   -> Ulcer on the 2nd toe: mupirocin ointment bid and keep covered with telfa/ bandage to protect skin from any trauma  -> Erosions and excoriations on the blt hands and LLE: topical antibiotics such as mupirocin ointment bid.   -> Psoriasis: while no active lesions are present to suggest psoriasis at this time, a topical steroid ointment like triamcinolone bid and antihistamines like loratidine or cetirizine can help with both itching and the lichenified dry patches on LLE and hands; moisturizers can be used for xerosis/ dry skin: patient should also use soaps and moisturizers for people with sensitive skin like Aveeno or Cetaphil after discharge.   -> Tense blisters with clear liquid on left lower extremity are more likely be secondary to possible Bullous Pemphigoid or diabetes, with unlikely contribution from psoriasis, but triamcinolone ointment bid can also be used on them when they are itchy. A biopsy would not be indicated at this time considering the mental status of the patient   - Burn consult: No burn surgical intervention at this time, local wound care BID, wash with soap and water, apply hydrogel to crusted areas, cover with xeroform, wrap with kerlix, secure with tape      #AMS/Agitation   - initially thought to be 2/2 hypoglycemia - but this has since resolved and pt was still agitated   - CTH and EEG unremarkable   - As per Neuro: repeat EEG: generalized slowing    # HTN / HLD  # H/o CAD/MI s/p CABG/VERONICA  # Severe HF/ICM s/p AICD, euvolemic on exam  - c/w home asa, statin, digoxin, effient, toprol; takes lasix prn for swelling  - c/w outpatient follow up with Dr. Lopez    # Elevated troponin, stable  - did not have active chest pain    # Uncontrolled DM ( last A1c 12) formerly on insulin pump  #Hypoglycemia   - HbA1c: 9.8%  - AMS 2/2 hypoglycemia this admission   - As per endo: Lantus 15 u once FS persistently > 180 and once eating better can start admelog 4 units TIDPC , hold if npo or not eating more then 50% of meal    - However pt glucose levels are <180, f/u with endo OP    # Hypothyroidism  - c/w home synthroid  - TSH 5.61, T4: 1.1     # GERD  - c/w home PPI    # Depression/Anxiety  - c/w home duloxetine 30mg TID    # Restless leg syndrome  - on valium 2mg bid prn at home- held in the hospital      # Microcytic Anemia  - Iron: 27   - Saturation: 8 %  - Total Binding capacity: 342     # H/o Diverticulitis and Celiac Disease     HPI:  68 yo M w HTN, HLD, CAD/MI s/p CABG/VERONICA, severe HF/ICM s/p AICD, Uncontrolled DM w peripheral neuropathy, R AKA, MRSA bacteremia, Psoriasis (on topical steroids), L ankle surgery with fixation hardware presents for worsening rash and tenderness over bilateral hand and left LE associated with redness and drainage over past week. Patient says the leg has been gradually worsening over the last few days. He is also experiencing b/l wrist swelling and pain.    Of note, patieint had a R TKA several years ago c/b recurrent PJI and multiple revisions  in 09/2020 by Dr. Castro (explant and abx spacer exchange), and most recently transferred to [facility] on 1/6/22  from [facility] for MRSA bacteremia due to recurrent PJI s/p Revision gastroc flap by PRS, antibiotic cement spacer removal, I&D, replacement on 1/6/22, and subsequent OR with vascular on 1/10/22 for right AKA and Closure of MOISES on 1/14/22. Patient also w  history of acute cholecystitis s/p perc sudhir 2/28/20 (drain removed in late May 2020) c/b by cholecystocutaneous excision of cholecystocutaneous fistulous tract with Dr. King in 04/08/2021 and eventual elective resection of fistula tract and partial cholecystectomy in 06/2022.     In ED, T 98.4, /86, HR 77, RR 17, SpO2 99; Labs significant for Hgb 11.8 w MCV 64.7 and increased RDW, Trop .06, BNP 9402 and VBG pCO2 65    Patient started on ABX and admitted for further evaluation. (11 Jan 2023 03:35)    A&P:  # Acute Hypoxic Respiratory failure due to Acute HFrEF:    s/p AICD.  CXR showed bilateral pleural effusion, bilateral infiltrates.   Echo showed severe reduced LVEF <15%,   Continue Lasix 40mg IV BID. repeat CXR reviewed- showing improvement- Can switch to PO tomorrow   Continue Metoprolol 25mg daily.   Digoxin level 2.5 1/13, repeated 1/23: 1.4, reduced Digoxin to 0.125mg daily.   Fluid restriction 1.2L/day. Low sodium diet, daily weight.   - wean off oxygen as toelrated- d/w nursing staff and     # Multiple Left lower extremity wound: Multiple small ulcer, erosion, Bulla.   Bilateral Hand erosions and excoriations.   No evidence of cellulitis.   Arterial duplex 1/11 was negative.   Patient was evaluated by podiatry, ID, dermatology and Burn.   s/p brief antibiotics, discontinued.   Blood cx no growth.   Continue current wound care.     #  metabolic encephalopathy- resolved  From Hypoglycemia vs hospital acquired delirium   Head CT was negative. EEG unremarkable     # CAD s/p CABG, PCI and VERONICA  No chest pain, Troponin 0.05 stable.   Cardiac cath Dec 2018.   Continue ASA, Effient, Metoprolol and Lipitor 80mg daily.     # Iron deficiency anemia:   Hb stable. Continue Ferrous sulfate.     # DM type 2: uncontrolled, ( last A1c 12) formerly on insulin pump  # uncontrolled hyperglycemia  HbA1c: 9.8%  FS is controlled, Hypoglycemia resolved.   previously on lantus- held because of hypoglycemia- restart lantus 10 U at bed time  cover with sliding scale    Hypothyroidism  Continue Synthroid, TSH 5.61, FT4 1.2    Depression/Anxiety: continue duloxetine 30mg TID.   Restless leg syndrome:  on valium 2mg bid prn at home- held here     Celiac Disease:   Nutrition f/u and vitamin replacement prn

## 2023-01-20 NOTE — DISCHARGE NOTE PROVIDER - DISCHARGE DIET
DASH Diet/Consistent Carbohydrate Diabetic Diets DASH Diet/Consistent Carbohydrate Diabetic Diets/Easy to Chew Diet

## 2023-01-20 NOTE — DISCHARGE NOTE PROVIDER - NSDCCPCAREPLAN_GEN_ALL_CORE_FT
PRINCIPAL DISCHARGE DIAGNOSIS  Diagnosis: Cellulitis  Assessment and Plan of Treatment: You were admitted for cellulitis which is infection of the skin. You were seen by the internal medicine team and the infectious disease team.   Take the prescribed antibiotic medicine you are given as directed until it is gone. Take it even if you feel better. It treats the infection and stops it from  Not taking all the medicine can make future infections hard to treat. Keep the infected area clean. When possible, raise the infected area above the level of your heart. This helps keep swelling down. Apply clean bandages as advised. Wash your hands often to prevent spreading the infection. In the future, wash your hands before and after you touch cuts, scratches, or bandages. This will help prevent infection.   Call your doctor or returnt o the emergency room or call 911 immediately if you have any of the following: Difficulty or pain when moving the joints above or below the infected area, Discharge or pus draining from the area, rever of 100.4°F (38°C) or higher, or as directed by your healthcare provider, pain that gets worse in or around the infected site, redness that gets worse in or around the infected area, particularly if the area of redness expands to a wider area, shaking chills, swelling of the infected area, vomiting.      SECONDARY DISCHARGE DIAGNOSES  Diagnosis: Elevated troponin  Assessment and Plan of Treatment:      PRINCIPAL DISCHARGE DIAGNOSIS  Diagnosis: Cellulitis  Assessment and Plan of Treatment: Recommended diet: easy to chew foods (avoidance of difficult to chew/swallow foods such as rough meats)  For insulin regimen at home, take 10 units of long-acting insulin daily and then short-acting  Wound Care twice a day: wash wounds of lower leg, foot, and toe with soap and water. Apply hydrogel, cover in xerofam, wrap with kerlix and secure with tape.  You came to the hospital with respiratory failue due to heart failure exacerbation. It is vital that you take your medications as directed and to limit salt intake and fluid intake. Call a health care provider or return to the hospital if you experience worsening shortness of breath.  You were also admitted for cellulitis which is infection of the skin. You were seen by the internal medicine team and the infectious disease team.   Call your doctor or return to the emergency room or call 911 immediately if you have any of the following: Difficulty or pain when moving the joints above or below the infected area, Discharge or pus draining from the area, rever of 100.4°F (38°C) or higher, or as directed by your healthcare provider, pain that gets worse in or around the infected site, redness that gets worse in or around the infected area, particularly if the area of redness expands to a wider area, shaking chills, swelling of the infected area, vomiting.      SECONDARY DISCHARGE DIAGNOSES  Diagnosis: Elevated troponin  Assessment and Plan of Treatment:      PRINCIPAL DISCHARGE DIAGNOSIS  Diagnosis: Cellulitis  Assessment and Plan of Treatment: Recommended diet: easy to chew foods (avoidance of difficult to chew/swallow foods such as rough meats)  For insulin regimen at home, take 10 units of long-acting insulin (glargine) one time per day and then three times a day before meals measure your blood glucose and take the short-acting (lispro)  according to your blood glucose level.  Wound Care twice a day: wash wounds of lower leg, foot, and toe with soap and water. Apply hydrogel, cover in xerofam, wrap with kerlix and secure with tape.  You came to the hospital with respiratory failue due to heart failure exacerbation. It is vital that you take your medications as directed and to limit salt intake and fluid intake. Call a health care provider or return to the hospital if you experience worsening shortness of breath.  You were also admitted for cellulitis which is infection of the skin. You were seen by the internal medicine team and the infectious disease team.   Call your doctor or return to the emergency room or call 911 immediately if you have any of the following: Difficulty or pain when moving the joints above or below the infected area, Discharge or pus draining from the area, rever of 100.4°F (38°C) or higher, or as directed by your healthcare provider, pain that gets worse in or around the infected site, redness that gets worse in or around the infected area, particularly if the area of redness expands to a wider area, shaking chills, swelling of the infected area, vomiting.      SECONDARY DISCHARGE DIAGNOSES  Diagnosis: Elevated troponin  Assessment and Plan of Treatment:      PRINCIPAL DISCHARGE DIAGNOSIS  Diagnosis: Cellulitis  Assessment and Plan of Treatment: Recommended diet: easy to chew foods (avoidance of difficult to chew/swallow foods such as rough meats)  For insulin regimen at home, take 10 units of long-acting insulin (glargine) one time per day and then three times a day before meals measure your blood glucose and take the short-acting (lispro)  according to your blood glucose level.  Wound Care twice a day: wash wounds of lower leg, foot, and toe with soap and water. Apply hydrogel, cover in xerofam, wrap with kerlix and secure with tape.  You came to the hospital with respiratory failue due to heart failure exacerbation. It is vital that you take your medications as directed and to limit salt intake and fluid intake. Call a health care provider or return to the hospital if you experience worsening shortness of breath.  You were also admitted for cellulitis which is infection of the skin. You were seen by the internal medicine team and the infectious disease team.   Call your doctor or return to the emergency room or call 911 immediately if you have any of the following: Difficulty or pain when moving the joints above or below the infected area, Discharge or pus draining from the area, rever of 100.4°F (38°C) or higher, or as directed by your healthcare provider, pain that gets worse in or around the infected site, redness that gets worse in or around the infected area, particularly if the area of redness expands to a wider area, shaking chills, swelling of the infected area, vomiting.      SECONDARY DISCHARGE DIAGNOSES  Diagnosis: Acute on chronic systolic congestive heart failure  Assessment and Plan of Treatment: Plealse take medications as prescribed. Weigh yourself daily. If you gain more tahn 5 lbs in one day call your cardiologist immediately    Diagnosis: Hypoglycemia  Assessment and Plan of Treatment: Please measure you sugar before meals and before bed and adjust insulin accordingly

## 2023-01-20 NOTE — PROGRESS NOTE ADULT - SUBJECTIVE AND OBJECTIVE BOX
FLOWER MARISCAL  67y  Male      Patient is a 67y old  Male who presents with a chief complaint of recurrent cellulitis (20 Jan 2023 13:52)      INTERVAL HPI/OVERNIGHT EVENTS: no acute events overnight. pleasantly confused. sit at bedside. daughter and wife at bedside expressing behavioral concerns.       REVIEW OF SYSTEMS:  unable to accurately obtain    VITALS  T(C): 36.2 (01-20-23 @ 14:38), Max: 36.2 (01-19-23 @ 21:00)  HR: 93 (01-20-23 @ 14:38) (84 - 93)  BP: 132/76 (01-20-23 @ 14:38) (114/81 - 132/76)  RR: 18 (01-20-23 @ 04:53) (18 - 20)  SpO2: --  Wt(kg): --Vital Signs Last 24 Hrs  T(C): 36.2 (20 Jan 2023 14:38), Max: 36.2 (19 Jan 2023 21:00)  T(F): 97.2 (20 Jan 2023 14:38), Max: 97.2 (20 Jan 2023 14:38)  HR: 93 (20 Jan 2023 14:38) (84 - 93)  BP: 132/76 (20 Jan 2023 14:38) (114/81 - 132/76)  BP(mean): 95 (19 Jan 2023 21:00) (95 - 95)  RR: 18 (20 Jan 2023 04:53) (18 - 20)  SpO2: --      PHYSICAL EXAM:  GENERAL: chronically ill appearing  PSYCH: no agitation, confused  NERVOUS SYSTEM:  Alert & Oriented X1-2  PULMONARY: Clear to percussion bilaterally; No rales, rhonchi, wheezing, or rubs  CARDIOVASCULAR: Regular rate and rhythm; No murmurs, rubs, or gallops  GI: Soft, Nontender, Nondistended; Bowel sounds present  EXTREMITIES:  2+ Peripheral Pulses, No clubbing, cyanosis, or edema  SKIN: No rashes or lesions    Consultant(s) Notes Reviewed:  [x ] YES  [ ] NO    Discussed with Consultants/Other Providers [ x] YES     LABS                          11.1   8.80  )-----------( 379      ( 19 Jan 2023 07:28 )             39.2     01-20    138  |  104  |  40<H>  ----------------------------<  141<H>  4.6   |  24  |  1.2    Ca    8.9      20 Jan 2023 07:26  Mg     1.9     01-19    POCT Blood Glucose.: 188 mg/dL (20 Jan 2023 11:06)    RADIOLOGY & ADDITIONAL TESTS:  - no images 1/20  Imaging Personally Reviewed:  [ ] YES  [ ] NO    HEALTH ISSUES - PROBLEM Dx:  Cellulitis of hand      MEDICATIONS  (STANDING):  aspirin enteric coated 81 milliGRAM(s) Oral daily  atorvastatin 80 milliGRAM(s) Oral at bedtime  bacitracin   Ointment 1 Application(s) Topical two times a day  chlorhexidine 2% Cloths 1 Application(s) Topical <User Schedule>  clobetasol 0.05% Cream 1 Application(s) Topical two times a day  clotrimazole 1% Cream 1 Application(s) Topical two times a day  digoxin     Tablet 250 MICROGram(s) Oral daily  DULoxetine 30 milliGRAM(s) Oral <User Schedule>  enoxaparin Injectable 40 milliGRAM(s) SubCutaneous every 24 hours  glucagon  Injectable 1 milliGRAM(s) IntraMuscular once  insulin lispro (ADMELOG) corrective regimen sliding scale   SubCutaneous three times a day before meals  latanoprost 0.005% Ophthalmic Solution 1 Drop(s) Both EYES at bedtime  levothyroxine 25 MICROGram(s) Oral daily  metoprolol succinate ER 25 milliGRAM(s) Oral daily  multivitamin 1 Tablet(s) Oral daily  mupirocin 2% Ointment 1 Application(s) Topical two times a day  naloxone Injectable 1 milliGRAM(s) IV Push once  pantoprazole    Tablet 40 milliGRAM(s) Oral before breakfast  prasugrel 10 milliGRAM(s) Oral daily    MEDICATIONS  (PRN):  oxycodone    5 mG/acetaminophen 325 mG 1 Tablet(s) Oral every 8 hours PRN Moderate Pain (4 - 6)

## 2023-01-20 NOTE — DISCHARGE NOTE PROVIDER - NSDCFUADDINST_GEN_ALL_CORE_FT
- Please make sure to follow up with endocrinologist for diabetes management.    - Please make sure to follow up with endocrinologist for diabetes management.   easy to chew and thin liquids for meals

## 2023-01-20 NOTE — DISCHARGE NOTE PROVIDER - PROVIDER TOKENS
PROVIDER:[TOKEN:[[id number]],FOLLOWUP:[1 week]],PROVIDER:[TOKEN:[[id number]],FOLLOWUP:[1 week]]

## 2023-01-20 NOTE — DISCHARGE NOTE PROVIDER - CARE PROVIDER_API CALL
Jen Floyd)  Medicine  [address] [place] [id number]  Phone: [phone]  Fax: [phone]  Follow Up Time: 1 week    Mihir Lopez)  Cardiovascular Disease; Interventional Cardiology  [address] [place] [id number]  Phone: [phone]  Fax: [phone]  Follow Up Time: 1 week

## 2023-01-21 NOTE — PROGRESS NOTE ADULT - SUBJECTIVE AND OBJECTIVE BOX
FLOWER MARISCAL  67y  Male      Patient is a 67y old  Male who presents with a chief complaint of recurrent cellulitis (20 Jan 2023 16:18)      INTERVAL HPI/OVERNIGHT EVENTS: Rapid response called for patient desatted. Please see note for further details      REVIEW OF SYSTEMS:  CONSTITUTIONAL: No fever, weight loss, or fatigue  RESPIRATORY: No cough, wheezing, chills or hemoptysis; No shortness of breath  CARDIOVASCULAR: No chest pain, palpitations, dizziness, or leg swelling  GASTROINTESTINAL: No abdominal or epigastric pain. No nausea, vomiting, or hematemesis; No diarrhea or constipation. No melena or hematochezia.  GENITOURINARY: No dysuria, frequency, hematuria, or incontinence  NEUROLOGICAL: No headaches, memory loss, loss of strength, numbness, or tremors  SKIN: No itching, burning, rashes, or lesions   MUSCULOSKELETAL: No joint pain or swelling; No muscle, back, or extremity pain  PSYCHIATRIC: No depression, anxiety, mood swings, or difficulty sleeping  All other review of systems negative    T(C): 36.8 (01-20-23 @ 21:20), Max: 36.8 (01-20-23 @ 21:20)  HR: 87 (01-21-23 @ 08:00) (81 - 93)  BP: 141/84 (01-21-23 @ 08:00) (124/68 - 141/84)  RR: 20 (01-21-23 @ 08:00) (17 - 20)  SpO2: 100% (01-21-23 @ 08:00) (97% - 100%)  Wt(kg): --Vital Signs Last 24 Hrs  T(C): 36.8 (20 Jan 2023 21:20), Max: 36.8 (20 Jan 2023 21:20)  T(F): 98.3 (20 Jan 2023 21:20), Max: 98.3 (20 Jan 2023 21:20)  HR: 87 (21 Jan 2023 08:00) (81 - 93)  BP: 141/84 (21 Jan 2023 08:00) (124/68 - 141/84)  BP(mean): 84 (20 Jan 2023 21:20) (84 - 84)  RR: 20 (21 Jan 2023 08:00) (17 - 20)  SpO2: 100% (21 Jan 2023 08:00) (97% - 100%)    Parameters below as of 21 Jan 2023 08:00  Patient On (Oxygen Delivery Method): mask, Venturi            PHYSICAL EXAM:  GENERAL: NAD, well-groomed, well-developed  PSYCH: no agitation, baseline mentation  NERVOUS SYSTEM:  Alert & Oriented X3, Motor Strength 5/5 B/L upper and lower extremities; Sensory intact; FTN WNL  PULMONARY: Clear to percussion bilaterally; No rales, rhonchi, wheezing, or rubs  CARDIOVASCULAR: Regular rate and rhythm; No murmurs, rubs, or gallops  GI: Soft, Nontender, Nondistended; Bowel sounds present  EXTREMITIES:  2+ Peripheral Pulses, No clubbing, cyanosis, or edema  LYMPH: No lymphadenopathy noted  SKIN: No rashes or lesions    Consultant(s) Notes Reviewed:  [x ] YES  [ ] NO    Discussed with Consultants/Other Providers [ x] YES     LABS      01-20    138  |  104  |  40<H>  ----------------------------<  141<H>  4.6   |  24  |  1.2    Ca    8.9      20 Jan 2023 07:26      ABG - ( 21 Jan 2023 06:32 )  pH, Arterial: 7.42  pH, Blood: x     /  pCO2: 43    /  pO2: 360   / HCO3: 28    / Base Excess: 3.0   /  SaO2: 99.3                  Lactate Trend        CAPILLARY BLOOD GLUCOSE      POCT Blood Glucose.: 121 mg/dL (21 Jan 2023 06:06)        RADIOLOGY & ADDITIONAL TESTS:    Imaging Personally Reviewed:  [ ] YES  [ ] NO    HEALTH ISSUES - PROBLEM Dx:  Cellulitis of hand           FLOWER MARISCAL  67y  Male      Patient is a 67y old  Male who presents with a chief complaint of recurrent cellulitis (20 Jan 2023 16:18)      INTERVAL HPI/OVERNIGHT EVENTS: Rapid response called for patient desatted. Please see note for further details      REVIEW OF SYSTEMS:  unable to accurately obtain    VITALS  T(C): 36.8 (01-20-23 @ 21:20), Max: 36.8 (01-20-23 @ 21:20)  HR: 87 (01-21-23 @ 08:00) (81 - 93)  BP: 141/84 (01-21-23 @ 08:00) (124/68 - 141/84)  RR: 20 (01-21-23 @ 08:00) (17 - 20)  SpO2: 100% (01-21-23 @ 08:00) (97% - 100%)  Wt(kg): --Vital Signs Last 24 Hrs  T(C): 36.8 (20 Jan 2023 21:20), Max: 36.8 (20 Jan 2023 21:20)  T(F): 98.3 (20 Jan 2023 21:20), Max: 98.3 (20 Jan 2023 21:20)  HR: 87 (21 Jan 2023 08:00) (81 - 93)  BP: 141/84 (21 Jan 2023 08:00) (124/68 - 141/84)  BP(mean): 84 (20 Jan 2023 21:20) (84 - 84)  RR: 20 (21 Jan 2023 08:00) (17 - 20)  SpO2: 100% (21 Jan 2023 08:00) (97% - 100%)    Parameters below as of 21 Jan 2023 08:00  Patient On (Oxygen Delivery Method): mask, Venturi      PHYSICAL EXAM:  GENERAL: chronically ill appearing, somnolent, Non rebreather mask on  PSYCH: no agitation, baseline mentation  NERVOUS SYSTEM:  Alert & Oriented X0-1, somnolent but arousable to painful stimuli and verbal command. GCS ~8  PULMONARY: symmetrical chest rise, poor respiratory effort, difficult to appreciate crackles in anterolateral position  CARDIOVASCULAR: nontachycardic  GI: Soft, Nontender, Nondistended; Bowel sounds present  EXTREMITIES:  2+ Peripheral Pulses, No clubbing, cyanosis, or edema  SKIN: No rashes or lesions    Consultant(s) Notes Reviewed:  [x ] YES  [ ] NO    Discussed with Consultants/Other Providers [ x] YES     LABS      01-20    138  |  104  |  40<H>  ----------------------------<  141<H>  4.6   |  24  |  1.2    Ca    8.9      20 Jan 2023 07:26      ABG - ( 21 Jan 2023 06:32 )  pH, Arterial: 7.42  pH, Blood: x     /  pCO2: 43    /  pO2: 360   / HCO3: 28    / Base Excess: 3.0   /  SaO2: 99.3      POCT Blood Glucose.: 121 mg/dL (21 Jan 2023 06:06)    RADIOLOGY & ADDITIONAL TESTS:  Xray Chest 1 View- PORTABLE-Routine (Xray Chest 1 View- PORTABLE-Routine .) (01.20.23 @ 16:34) >  Right pleural effusion/opacity.    Imaging Personally Reviewed:  [X ] YES  [ ] NO    HEALTH ISSUES - PROBLEM Dx:  Cellulitis of hand    MEDICATIONS  (STANDING):  aspirin enteric coated 81 milliGRAM(s) Oral daily  atorvastatin 80 milliGRAM(s) Oral at bedtime  bacitracin   Ointment 1 Application(s) Topical two times a day  chlorhexidine 2% Cloths 1 Application(s) Topical <User Schedule>  clobetasol 0.05% Cream 1 Application(s) Topical two times a day  clotrimazole 1% Cream 1 Application(s) Topical two times a day  digoxin     Tablet 250 MICROGram(s) Oral daily  DULoxetine 30 milliGRAM(s) Oral <User Schedule>  enoxaparin Injectable 40 milliGRAM(s) SubCutaneous every 24 hours  ferrous    sulfate 325 milliGRAM(s) Oral daily  furosemide   Injectable 40 milliGRAM(s) IV Push two times a day  glucagon  Injectable 1 milliGRAM(s) IntraMuscular once  insulin lispro (ADMELOG) corrective regimen sliding scale   SubCutaneous three times a day before meals  latanoprost 0.005% Ophthalmic Solution 1 Drop(s) Both EYES at bedtime  levothyroxine 25 MICROGram(s) Oral daily  metoprolol succinate ER 25 milliGRAM(s) Oral daily  multivitamin 1 Tablet(s) Oral daily  mupirocin 2% Ointment 1 Application(s) Topical two times a day  naloxone Injectable 1 milliGRAM(s) IV Push once  pantoprazole    Tablet 40 milliGRAM(s) Oral before breakfast  prasugrel 10 milliGRAM(s) Oral daily  QUEtiapine 50 milliGRAM(s) Oral at bedtime    MEDICATIONS  (PRN):

## 2023-01-21 NOTE — RAPID RESPONSE TEAM SUMMARY - NSADDTLFINDINGSRRT_GEN_ALL_CORE
Of note, patient given first dose of Seroquel last night  Of note, patient given first dose of Seroquel last night.

## 2023-01-21 NOTE — CONSULT NOTE ADULT - CONSULT REQUESTED BY NAME
Medical team
house doctor
Charito Leon
medical team
ED
Laura Banerjee
Primary team
service
ED
ed
Anabel Hammer
medicine
Dr Travis

## 2023-01-21 NOTE — CONSULT NOTE ADULT - REASON FOR ADMISSION
recurrent cellulitis

## 2023-01-21 NOTE — CONSULT NOTE ADULT - CONSULT REQUESTED DATE/TIME
15-Kris-2023 11:39
14-Jan-2023 12:59
13-Jan-2023
16-Jan-2023 14:15
16-Jan-2023 22:56
11-Jan-2023 18:47
12-Jan-2023 16:35
13-Jan-2023 15:24
21-Jan-2023
19-Jan-2023 11:42
12-Jan-2023 09:18
15-Kris-2023 15:26
16-Jan-2023 12:53

## 2023-01-21 NOTE — PROGRESS NOTE ADULT - TIME BILLING
cellulitis
Counseled patient about diagnostic testing and treatment plan. All questions answered. Abnormal lab/radiographical/microbiological data reviewed.
charting, face to face with patient and family, resident teaching rounds, and interdisciplinary planning.

## 2023-01-21 NOTE — CHART NOTE - NSCHARTNOTEFT_GEN_A_CORE
HPI:  67M PMH: HTN, HLD, CAD/MI s/p CABG/VERONICA, severe HF/ICM s/p AICD, Uncontrolled DM w peripheral neuropathy, R AKA, MRSA bacteremia, ?Psoriasis (on topical steroids), L ankle surgery with fixation hardware.   Presented 1/10 for worsening erythematous rash with draining lesions, swelling and tenderness over bilateral hand, and wrist and LLE x1week.     Of note, patieint had a R TKA several years ago c/b recurrent PJI and multiple revisions in 09/2020 by Dr. 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 Patient also w  history of acute cholecystitis s/p perc sudhir 2/28/20 (drain removed in late May 2020) c/b by cholecystocutaneous excision of cholecystocutaneous fistulous tract with Dr. King in 04/08/2021 and eventual elective resection of fistula tract and partial cholecystectomy in 06/2022.     In ED, T 98.4, /86, HR 77, RR 17, SpO2 99; Labs significant for Hgb 11.8 w MCV 64.7 and increased RDW, Trop .06, BNP 9402 and VBG pCO2 65. Patient started on ABX and admitted for further evaluation. Cardiology evaluated pt 1/12 for elevated troponin. Likely demand ischemia and continued medical management including digoxin and metoprolol. Podiatry following for LE swelling. Lower Extremity Arterial Duplex B/L; Reviewed, Normal arterial blood flow Pt on vancomycin and cefepime for cellulitis - ID following. Pt noted to have cold LE. Vascular consulted to evaluate.    ICU Upgrade:  1/13: Rapid response called on patient for unresponsiveness. Finger stick found to be 31. Pt given D50 and started on D10. Despite treatment repeat fingerstick was 39. Glucagon and D50 again given. Repeat fingerstick was 96; patient having continued altered mental status.     CCU course:    No acute events overnight. Patient is alert and oriented x3 this morning. Diet was advanced today (1/14) which he tolerated well. FS normalized with readings ranging in the mid to upper 100's. Unclear etiology of his refractory hypoglycemia. Wife is bedside and states that the patient had low appetite yesterday and barely ate his meals. Hypoglycemia could be related to the reduced oral intake. Patient was receiving his insuline home dose under a constant carbohydrate diet at the hospital. It is plausible that the patient's usual diet includes a larger amount of carbohydrates and his home insulin regimen is excessive at this time. Lantus lowered from 60 units to 30, and sliding scare reduced to 6 units. Increase as needed. Patient got a  2mg dose of IV morphine to help with pain secondary to his cellulitis     4a:  Pt was being being treated with Ceftriaxone/ vanco for cellulitis, Which was the stopped as per ID. He still had same change in mental status, with agitation, slow response and delirium. He was assessed by neuro which recommended repeat EEG. Pt was medically stable, delirium most probably from hospital stay. He was prepared for d/c. However over night on 1/21 he was less responsive, Sat dropped to 78%, Rapid was called. Chest xray showed worsening vascular congestion. Pt was never on standing diuretics. He was at home on PRN lasix for swelling. Lasix 60 was given and Lasix 40 BID started. He will be transferred to [facility] for heart failure exacerbation.     # Recurrent LLE Cellulitis in setting of mrsa bacteremia and numerous joint infections in past - not septic on admission  # Scaling rash b/l UE and LLE   - LLE erythematous with ulcers/abrasions/bullae c/f superimposed cellulitus   - Dermatology eval today- per verbal discussion, does not appear to be psoriasis, but would start high intensity steroids such as Clobetasol   - ID following: D/c all antibiotics, there is no sign of cellulitis or other infection   - BCX negative`  - Derm recs:  -> Tinea pedis: agree to continue using a topical anti-fungal like clotrimazole cream bid long term to decrease the chances of any tinea contribution to skin integrity in the context of recurrent cellulitis.   -> Ulcer on the 2nd toe: mupirocin ointment bid and keep covered with telfa/ bandage to protect skin from any trauma  -> Erosions and excoriations on the blt hands and LLE can also be treated with topical antibiotics such as mupirocin ointment bid.   -> Psoriasis: while no active lesions are present to suggest psoriasis at this time, a topical steroid ointment like triamcinolone bid and antihistamines like loratidine or cetirizine can help with both itching and the lichenified dry patches on LLE and hands; moisturizers can be used for xerosis/ dry skin: patient should also use soaps and moisturizers for people with sensitive skin like Aveeno or Cetaphil after discharge.   -> Tense blisters with clear liquid on left lower extremity are more likely be secondary to possible Bullous Pemphigoid or diabetes, with unlikely contribution from psoriasis, but triamcinolone ointment bid can also be used on them when they are itchy. A biopsy would not be indicated at this time considering the mental status of the patient   - Burn consult: No burn surgical intervention at this time, local wound care BID, wash with soap and water, apply hydrogel to crusted areas, cover with xeroform, wrap with kerlix, secure with tape    #AMS/Agitation   - initially thought to be 2/2 hypoglycemia - but this has since resolved and pt still agitated   - CTH and EEG unremarkable   - required IM Haldol 1/16   - As per Neuro: repeat EEG    # HTN / HLD  # H/o CAD/MI s/p CABG/VERONICA  # Severe HF/ICM s/p AICD, euvolemic on exam  - c/w home asa, statin, digoxin, effient, toprol; takes lasix prn for swelling  - c/w outpatient follow up with Dr. Lopez    # Elevated troponin, stable  - no active chest pain    # Uncontrolled DM ( last A1c 12) formerly on insulin pump  #Hypoglycemia   - HbA1c: 9.8%  - AMS 2/2 hypoglycemia this admission   - As per endo: Start Lantus 15 u once FS persistently > 180 and once eating better can start admelog 4 units TIDPC , hold if npo or not eating more then 50% of meal      # Hypothyroidism  - c/w home synthroid  - TSH 5.61, T4: 1.1     # GERD  - c/w home PPI    # Depression/Anxiety  - c/w home duloxetine 30mg TID    # Restless leg syndrome  - on valium 2mg bid prn at home- held here     # Microcytic Anemia  - Iron: 27   - Saturation: 8 %  - Total Binding capacity: 342     # H/o Diverticulitis and Celiac Disease  - Nutrition f/u and vitamin replacement prn    #DVT PPX: LVX 40 QD  #GI PPX: PPI QD HPI:  67M PMH: HTN, HLD, CAD/MI s/p CABG/VERONICA, severe HF/ICM s/p AICD, Uncontrolled DM w peripheral neuropathy, R AKA, MRSA bacteremia, ?Psoriasis (on topical steroids), L ankle surgery with fixation hardware.   Presented 1/10 for worsening erythematous rash with draining lesions, swelling and tenderness over bilateral hand, and wrist and LLE x1week.     Of note, patieint had a R TKA several years ago c/b recurrent PJI and multiple revisions in 09/2020 by Dr. Castro (explant and abx spacer exchange), and most recently transferred to [facility] on 1/6/22  from [facility] for MRSA bacteremia due to recurrent PJI s/p Revision gastroc flap by PRS, antibiotic cement spacer removal, I&D, replacement on 1/6/22, and subsequent OR with vascular on 1/10/22 for right AKA and Closure of MOISES on 1/14/22. Patient also w  history of acute cholecystitis s/p perc sudhir 2/28/20 (drain removed in late May 2020) c/b by cholecystocutaneous excision of cholecystocutaneous fistulous tract with Dr. King in 04/08/2021 and eventual elective resection of fistula tract and partial cholecystectomy in 06/2022.     In ED, T 98.4, /86, HR 77, RR 17, SpO2 99; Labs significant for Hgb 11.8 w MCV 64.7 and increased RDW, Trop .06, BNP 9402 and VBG pCO2 65. Patient started on ABX and admitted for further evaluation. Cardiology evaluated pt 1/12 for elevated troponin. Likely demand ischemia and continued medical management including digoxin and metoprolol. Podiatry following for LE swelling. Lower Extremity Arterial Duplex B/L; Reviewed, Normal arterial blood flow Pt on vancomycin and cefepime for cellulitis - ID following. Pt noted to have cold LE. Vascular consulted to evaluate.    ICU Upgrade:  1/13: Rapid response called on patient for unresponsiveness. Finger stick found to be 31. Pt given D50 and started on D10. Despite treatment repeat fingerstick was 39. Glucagon and D50 again given. Repeat fingerstick was 96; patient having continued altered mental status.     CCU course:    No acute events overnight. Patient is alert and oriented x3 this morning. Diet was advanced today (1/14) which he tolerated well. FS normalized with readings ranging in the mid to upper 100's. Unclear etiology of his refractory hypoglycemia. Wife is bedside and states that the patient had low appetite yesterday and barely ate his meals. Hypoglycemia could be related to the reduced oral intake. Patient was receiving his insuline home dose under a constant carbohydrate diet at the hospital. It is plausible that the patient's usual diet includes a larger amount of carbohydrates and his home insulin regimen is excessive at this time. Lantus lowered from 60 units to 30, and sliding scare reduced to 6 units. Increase as needed. Patient got a  2mg dose of IV morphine to help with pain secondary to his cellulitis     4a:  Pt was being being treated with Ceftriaxone/ vanco for cellulitis, Which was the stopped as per ID. He still had same change in mental status, with agitation, slow response and delirium. He was assessed by neuro which recommended repeat EEG. Pt was medically stable, delirium most probably from hospital stay. He was prepared for d/c. However over night on 1/21 he was less responsive, Sat dropped to 78%, Rapid was called. Chest xray showed worsening vascular congestion. Pt was never on standing diuretics. He was at home on PRN lasix for swelling. Lasix 60 was given and Lasix 40 BID started. He will be transferred to [facility] for heart failure exacerbation.     #AHRF most likely 2/2 Acute on chronic HFrEF exacerbation   - Rapid on 1/21 for desat and heavy breathing, saturated well on NR  - Now on 6L NC  - CXR: b/l effusions R>L, with opacities  - Hx of heart failure with severely reduced EF ~15% on latest JOHN PAUL this admission  - at home takes lasix 20mg PRN for swelling but has not received any diuretics since being admitted  - has received intermittent IV fluids; some direct mIVF and some with IV abx administration  - s/p 60mg IV lasix stat during the rapid   - c/w lasix 40mg IV BID  - BMP, Mg, Phos BID; replete lytes PRN  - 1.5L fluid restriction  - 2g Na sodium restriction  - monitor I/Os; goal net negative 1-2L(condom cath in place for monitoring)  - daily weights      # Recurrent LLE Cellulitis in setting of mrsa bacteremia and numerous joint infections in past - not septic on admission  # Scaling rash b/l UE and LLE   - LLE erythematous with ulcers/abrasions/bullae c/f superimposed cellulitus   - Dermatology eval today- per verbal discussion, does not appear to be psoriasis, but would start high intensity steroids such as Clobetasol   - ID following: D/c all antibiotics, there is no sign of cellulitis or other infection   - BCX negative`  - Derm recs:  -> Tinea pedis: agree to continue using a topical anti-fungal like clotrimazole cream bid long term to decrease the chances of any tinea contribution to skin integrity in the context of recurrent cellulitis.   -> Ulcer on the 2nd toe: mupirocin ointment bid and keep covered with telfa/ bandage to protect skin from any trauma  -> Erosions and excoriations on the blt hands and LLE can also be treated with topical antibiotics such as mupirocin ointment bid.   -> Psoriasis: while no active lesions are present to suggest psoriasis at this time, a topical steroid ointment like triamcinolone bid and antihistamines like loratidine or cetirizine can help with both itching and the lichenified dry patches on LLE and hands; moisturizers can be used for xerosis/ dry skin: patient should also use soaps and moisturizers for people with sensitive skin like Aveeno or Cetaphil after discharge.   -> Tense blisters with clear liquid on left lower extremity are more likely be secondary to possible Bullous Pemphigoid or diabetes, with unlikely contribution from psoriasis, but triamcinolone ointment bid can also be used on them when they are itchy. A biopsy would not be indicated at this time considering the mental status of the patient   - Burn consult: No burn surgical intervention at this time, local wound care BID, wash with soap and water, apply hydrogel to crusted areas, cover with xeroform, wrap with kerlix, secure with tape    #AMS/Agitation   - initially thought to be 2/2 hypoglycemia - but this has since resolved and pt still agitated   - CTH and EEG unremarkable   - required IM Haldol 1/16   - As per Neuro: repeat EEG    # HTN / HLD  # H/o CAD/MI s/p CABG/VERONICA  # Severe HF/ICM s/p AICD, euvolemic on exam  - c/w home asa, statin, digoxin, effient, toprol; takes lasix prn for swelling  - c/w outpatient follow up with Dr. Lopez    # Elevated troponin, stable  - no active chest pain    # Uncontrolled DM ( last A1c 12) formerly on insulin pump  #Hypoglycemia   - HbA1c: 9.8%  - AMS 2/2 hypoglycemia this admission   - As per endo: Start Lantus 15 u once FS persistently > 180 and once eating better can start admelog 4 units TIDPC , hold if npo or not eating more then 50% of meal      # Hypothyroidism  - c/w home synthroid  - TSH 5.61, T4: 1.1     # GERD  - c/w home PPI    # Depression/Anxiety  - c/w home duloxetine 30mg TID    # Restless leg syndrome  - on valium 2mg bid prn at home- held here     # Microcytic Anemia  - Iron: 27   - Saturation: 8 %  - Total Binding capacity: 342     # H/o Diverticulitis and Celiac Disease  - Nutrition f/u and vitamin replacement prn    #DVT PPX: LVX 40 QD  #GI PPX: PPI QD

## 2023-01-21 NOTE — CONSULT NOTE ADULT - PROVIDER SPECIALTY LIST ADULT
Burn
Pain Medicine
Critical Care
Nutrition Support
Rehab Medicine
Cardiology
Dermatology
Endocrinology
Neurology
Podiatry
Cardiology
Neurology
Infectious Disease

## 2023-01-21 NOTE — RAPID RESPONSE TEAM SUMMARY - NSSITUATIONBACKGROUNDRRT_GEN_ALL_CORE
Rapid response called for unresponsiveness. Patient was altered this morning with a baseline mental status of AAO x3. Vitals were taken which were stable, but glucose was 31. Patient was also recently started on percocet. 
Rapid response called for patient being found less responsive than last night. Desaturated to 78%, vitals otherwise stable. Placed on NC then NRB with improvement in saturation to 100%. Crackles heard on lung auscultation. CXR from 1/20 worsened from previous

## 2023-01-21 NOTE — PROGRESS NOTE ADULT - ASSESSMENT
68 yo M w HTN, HLD, CAD/MI s/p CABG/VERONICA, severe HF/ICM s/p AICD, Uncontrolled DM w peripheral neuropathy, R AKA, MRSA bacteremia, ?Psoriasis (on topical steroids), L ankle surgery with fixation hardware presents for recurrent cellulitis.       # acute hypoxic respiratory failure  - concerns for acute on chronic HFrEF exacerbation   - sudden desaturations and labored breathing  - placed on NR and maintaing sats to 100%--> de-escalating to 6L  - CXR concerning for b/l effusions R>L, with opacities  - patient has heart failure with severely reduced EF ~15% on latest JOHN PAUL this admission  - at home takes lasix 20mg PRN for swelling but has not received any diuretics since being admitted  - has received intermittent IV fluids; some direct mIVF and some with IV abx administration  - s/p 60mg IV lasix stat this AM  - now on lasix 40mg IV BID  - BMP, Mg, Phos BID; replete lytes PRN  - 1.5L fluid restriction  - 2g Na sodium restriction  - monitor I/Os; goal net negative 1-2L(condom cath in place for monitoring)  - daily weights    # Recurrent LLE Cellulitis in setting of mrsa bacteremia and numerous joint infections in past - not septic on admission  # Scaling rash b/l UE and LLE   - LLE erythematous with ulcers/abrasions/bullae c/f superimposed cellulitus   - Dermatology eval today- per verbal discussion, does not appear to be psoriasis, but would start high intensity steroids such as Clobetasol   - ID following: c/w IV vanc and was on cefepime for 1 day which is switched to CFTX due to neurosx   - BCX negative  - Derm recs:  -> Tinea pedis: agree to continue using a topical anti-fungal like clotrimazole cream bid long term to decrease the chances of any tinea contribution to skin integrity in the context of recurrent cellulitis.   -> Ulcer on the 2nd toe: mupirocin ointment bid and keep covered with telfa/ bandage to protect skin from any trauma  -> Erosions and excoriations on the blt hands and LLE can also be treated with topical antibiotics such as mupirocin ointment bid.   -> Psoriasis: while no active lesions are present to suggest psoriasis at this time, a topical steroid ointment like triamcinolone bid and antihistamines like loratidine or cetirizine can help with both itching and the lichenified dry patches on LLE and hands; moisturizers can be used for xerosis/ dry skin: patient should also use soaps and moisturizers for people with sensitive skin like Aveeno or Cetaphil after discharge.   -> Tense blisters with clear liquid on left lower extremity are more likely be secondary to possible Bullous Pemphigoid or diabetes, with unlikely contribution from psoriasis, but triamcinolone ointment bid can also be used on them when they are itchy. A biopsy would not be indicated at this time considering the mental status of the patient     #AMS/Agitation   #Toxic metabolic encepahlopathy  - initially thought to be 2/2 hypoglycemia - but this has since resolved and pt still confused; likely hospital acquired delirium   - CTH and EEG unremarkable   - required IM Haldol 1/16   - As per Neuro: repeat EEG   - delirium precautions      # HTN / HLD  # H/o CAD/MI s/p CABG/VERONICA  # Severe HF/ICM s/p AICD, euvolemic on exam  - c/w home asa, statin, digoxin, effient, toprol; takes lasix prn for swelling  - c/w outpatient follow up with Dr. Lopez    # Elevated troponin, stable  - no active chest pain    # Uncontrolled DM ( last A1c 12) formerly on insulin pump  #Hypoglycemia   - HbA1c: 9.8%  - AMS 2/2 hypoglycemia this admission   - As per endo: Start Lantus 15 u once FS persistently > 180 and once eating better can start admelog 4 units TIDPC , hold if npo or not eating more then 50% of meal     # Hypothyroidism  - c/w home synthroid  - TSH 5.61, follow up T4     # GERD  - c/w home PPI    # Depression/Anxiety  - c/w home duloxetine 30mg TID    # Restless leg syndrome  - on valium 2mg bid prn at home- held here     # Microcytic Anemia  - iron 27, TIBC 342, sat 8%  - ferrous sulfate 325mg daily    # H/o Diverticulitis and Celiac Disease  - Nutrition f/u and vitamin replacement prn    #DVT PPX: LVX 40 QD  #GI PPX: PPI QD     #Progress Note Handoff  Pending (specify): clinical improvement  Family discussion: updated at bedside  Disposition: unknown

## 2023-01-21 NOTE — CONSULT NOTE ADULT - CONSULT REASON
hypoglycemia
CHF
LLE cellulitis
poor po intake, poor wound healing
Rash b/l wrists and hands
acute change in mental status
right AKA/ LLE cellulitis
Elevated Trop
ams
LLE "cellulitis" scattered wounds
refractory hypoglycemia
Bilateral hand/wrist pain from cellulitis
cellulitis

## 2023-01-21 NOTE — CONSULT NOTE ADULT - SUBJECTIVE AND OBJECTIVE BOX
HPI:  68 yo M w HTN, HLD, CAD/MI s/p CABG/VERONICA, severe HF/ICM s/p AICD, Uncontrolled DM w peripheral neuropathy, R AKA, MRSA bacteremia, Psoriasis (on topical steroids), L ankle surgery with fixation hardware presents for worsening rash and tenderness over bilateral hand and left LE associated with redness and drainage over past week. Patient says the leg has been gradually worsening over the last few days. He is also experiencing b/l wrist swelling and pain. The patient is being evaluated for possible CHF.     Of note, patieint had a R TKA several years ago c/b recurrent PJI and multiple revisions  in 09/2020 by Dr. Castro (explant and abx spacer exchange), and most recently transferred to [facility] on 1/6/22  from [facility] for MRSA bacteremia due to recurrent PJI s/p Revision gastroc flap by PRS, antibiotic cement spacer removal, I&D, replacement on 1/6/22, and subsequent OR with vascular on 1/10/22 for right AKA and Closure of MOISES on 1/14/22. Patient also w  history of acute cholecystitis s/p perc sudhir 2/28/20 (drain removed in late May 2020) c/b by cholecystocutaneous excision of cholecystocutaneous fistulous tract with Dr. King in 04/08/2021 and eventual elective resection of fistula tract and partial cholecystectomy in 06/2022.     In ED, T 98.4, /86, HR 77, RR 17, SpO2 99; Labs significant for Hgb 11.8 w MCV 64.7 and increased RDW, Trop .06, BNP 9402 and VBG pCO2 65    Patient started on ABX and admitted for further evaluation. (11 Jan 2023 03:35)      PAST MEDICAL & SURGICAL HISTORY  Status post percutaneous transluminal coronary angioplasty  2 stents    Atherosclerosis of coronary artery  CAD (coronary artery disease)    Osteoarthritis    HLD (hyperlipidemia)    Diabetes  on insulin pump    CHF (congestive heart failure)  EF ~ 25%    History of celiac disease    Diverticulitis    STEMI (ST elevation myocardial infarction)    Diabetic neuropathy  Hands and Feet    Anxiety and depression    Other postprocedural status  Fixation hardware in foot LEFT    Stented coronary artery  10/18 heart attack  INFERIOR WALL MI    S/P CABG x 1  2018    S/P TKR (total knee replacement), right  with infection Mrsa   per pt he was cleared from MRSA infection    Surgery, elective  right knee wound debridement    History of open reduction and internal fixation (ORIF) procedure  right hip    H/O shoulder surgery  right    AICD (automatic cardioverter/defibrillator) present  [facility]    Cholecystostomy care  drain inserted 2020 &amp; removed 4 months ago    History of tonsillectomy    History of hip replacement, total, right    Elective surgery  plastic surgery Left shin        FAMILY HISTORY:  FAMILY HISTORY:  Family history of heart disease (Mother)    Family history of allergies  daughter        SOCIAL HISTORY:  []smoker  []Alcohol  []Drug    ALLERGIES:  ACE inhibitors (Rash)  Entresto (Swelling)      MEDICATIONS:  MEDICATIONS  (STANDING):  aspirin enteric coated 81 milliGRAM(s) Oral daily  atorvastatin 80 milliGRAM(s) Oral at bedtime  bacitracin   Ointment 1 Application(s) Topical two times a day  chlorhexidine 2% Cloths 1 Application(s) Topical <User Schedule>  clobetasol 0.05% Cream 1 Application(s) Topical two times a day  clotrimazole 1% Cream 1 Application(s) Topical two times a day  digoxin     Tablet 250 MICROGram(s) Oral daily  DULoxetine 30 milliGRAM(s) Oral <User Schedule>  enoxaparin Injectable 40 milliGRAM(s) SubCutaneous every 24 hours  ferrous    sulfate 325 milliGRAM(s) Oral daily  furosemide   Injectable 40 milliGRAM(s) IV Push two times a day  glucagon  Injectable 1 milliGRAM(s) IntraMuscular once  insulin lispro (ADMELOG) corrective regimen sliding scale   SubCutaneous three times a day before meals  latanoprost 0.005% Ophthalmic Solution 1 Drop(s) Both EYES at bedtime  levothyroxine 25 MICROGram(s) Oral daily  metoprolol succinate ER 25 milliGRAM(s) Oral daily  multivitamin 1 Tablet(s) Oral daily  mupirocin 2% Ointment 1 Application(s) Topical two times a day  naloxone Injectable 1 milliGRAM(s) IV Push once  pantoprazole    Tablet 40 milliGRAM(s) Oral before breakfast  prasugrel 10 milliGRAM(s) Oral daily  QUEtiapine 50 milliGRAM(s) Oral at bedtime    MEDICATIONS  (PRN):      HOME MEDICATIONS:  Home Medications:  aspirin 81 mg oral delayed release tablet: 1 tab(s) orally once a day (22 Jun 2022 11:04)  digoxin 250 mcg (0.25 mg) oral tablet: 1 tab(s) orally once a day (11 Jan 2023 05:38)  DULoxetine 30 mg oral delayed release capsule: 1 cap(s) orally 3 times a day (11 Jan 2023 05:49)  levothyroxine 25 mcg (0.025 mg) oral tablet: 1 tab(s) orally once a day (22 Jun 2022 11:04)  metoprolol succinate 25 mg oral tablet, extended release: 1 tab(s) orally once a day (22 Jun 2022 11:04)  Multiple Vitamins oral tablet: 1 tab(s) orally once a day (22 Jun 2022 11:04)  pantoprazole 40 mg oral delayed release tablet: 1 tab(s) orally once a day (before a meal) (22 Jun 2022 11:04)  prasugrel 10 mg oral tablet: 1 tab(s) orally once a day (22 Jun 2022 11:04)  rosuvastatin 40 mg oral tablet: 1 tab(s) orally once a day (22 Jun 2022 11:04)      VITALS:   T(F): 98.3 (01-20 @ 21:20), Max: 98.3 (01-20 @ 21:20)  HR: 87 (01-21 @ 08:00) (81 - 94)  BP: 141/84 (01-21 @ 08:00) (114/81 - 141/84)  BP(mean): 84 (01-20 @ 21:20) (84 - 95)  RR: 20 (01-21 @ 08:00) (17 - 20)  SpO2: 100% (01-21 @ 08:00) (97% - 100%)    I&O's Summary      REVIEW OF SYSTEMS:     unable to obtain. The patient is sleeping with daughter and wife by bedside     PHYSICAL EXAM:  NEURO: patient is awake , alert and oriented  GEN: Not in acute distress  NECK: no thyroid enlargement, no JVD  LUNGS: Clear to auscultation bilaterally   CARDIOVASCULAR: S1/S2 present, RRR , + YULISA   ABD: Soft, non-tender, non-distended, +BS  EXT: R amputation   SKIN: left leg cellulitis     LABS:                        11.9   8.49  )-----------( 381      ( 21 Jan 2023 11:00 )             41.9     01-21    142  |  104  |  32<H>  ----------------------------<  121<H>  4.3   |  29  |  1.0    Ca    8.8      21 Jan 2023 11:00  Phos  3.1     01-21  Mg     1.8     01-21                Troponin trend:    Serum Pro-Brain Natriuretic Peptide: 22831 pg/mL (01-21-23 @ 11:00)      Hemoglobin A1C   Thyroid      RADIOLOGY:  -CXR:  -TTE:  -CCTA:  -STRESS TEST:  -CATHETERIZATION:    ECG:    TELEMETRY EVENTS:

## 2023-01-21 NOTE — RAPID RESPONSE TEAM SUMMARY - NSOTHERINTERVENTIONSRRT_GEN_ALL_CORE
ABG, CXR ABG, CXR, supplemental O2 prn, and IV lasix.       Attending Attestation:   I have personally and independently provided 60 minutes of critical care services. This excludes any time spent on separate procedures or teaching.

## 2023-01-21 NOTE — CONSULT NOTE ADULT - ASSESSMENT
Continue current care  Repeat CXR  Repeat 2Decho   DVT prophylaxis   Avoid sedatives   IV diuretics to keep patient negative balance   Transfer to Tele for cardiac monitoring    Pulmonary and ID evaluation   Wound care   Will F/U

## 2023-01-22 NOTE — PROGRESS NOTE ADULT - SUBJECTIVE AND OBJECTIVE BOX
FLOWER MARISCAL  67y  Male      Patient is a 67y old  Male who presents with a chief complaint of recurrent cellulitis (21 Jan 2023 15:10)      INTERVAL HPI/OVERNIGHT EVENTS:  He feels better, SOB improved.   Vital Signs Last 24 Hrs  T(C): 36.2 (22 Jan 2023 12:40), Max: 36.8 (21 Jan 2023 18:24)  T(F): 97.2 (22 Jan 2023 12:40), Max: 98.2 (21 Jan 2023 18:24)  HR: 88 (22 Jan 2023 12:40) (88 - 97)  BP: 113/68 (22 Jan 2023 12:40) (113/68 - 141/80)  BP(mean): --  RR: 18 (22 Jan 2023 12:40) (18 - 20)  SpO2: 100% (22 Jan 2023 12:40) (99% - 100%)    Parameters below as of 21 Jan 2023 21:05  Patient On (Oxygen Delivery Method): nasal cannula  O2 Flow (L/min): 4        01-21-23 @ 07:01  -  01-22-23 @ 07:00  --------------------------------------------------------  IN: 0 mL / OUT: 1250 mL / NET: -1250 mL            Consultant(s) Notes Reviewed:  [x ] YES  [ ] NO          MEDICATIONS  (STANDING):  aspirin enteric coated 81 milliGRAM(s) Oral daily  atorvastatin 80 milliGRAM(s) Oral at bedtime  bacitracin   Ointment 1 Application(s) Topical two times a day  chlorhexidine 2% Cloths 1 Application(s) Topical <User Schedule>  clobetasol 0.05% Cream 1 Application(s) Topical two times a day  clotrimazole 1% Cream 1 Application(s) Topical two times a day  digoxin     Tablet 250 MICROGram(s) Oral daily  DULoxetine 30 milliGRAM(s) Oral <User Schedule>  enoxaparin Injectable 40 milliGRAM(s) SubCutaneous every 24 hours  ferrous    sulfate 325 milliGRAM(s) Oral daily  furosemide   Injectable 40 milliGRAM(s) IV Push two times a day  glucagon  Injectable 1 milliGRAM(s) IntraMuscular once  insulin lispro (ADMELOG) corrective regimen sliding scale   SubCutaneous three times a day before meals  latanoprost 0.005% Ophthalmic Solution 1 Drop(s) Both EYES at bedtime  levothyroxine 25 MICROGram(s) Oral daily  metoprolol succinate ER 25 milliGRAM(s) Oral daily  multivitamin 1 Tablet(s) Oral daily  mupirocin 2% Ointment 1 Application(s) Topical two times a day  naloxone Injectable 1 milliGRAM(s) IV Push once  pantoprazole    Tablet 40 milliGRAM(s) Oral before breakfast  prasugrel 10 milliGRAM(s) Oral daily  QUEtiapine 50 milliGRAM(s) Oral at bedtime    MEDICATIONS  (PRN):      LABS                          11.3   8.58  )-----------( 363      ( 22 Jan 2023 05:51 )             39.8     01-22    143  |  104  |  30<H>  ----------------------------<  101<H>  4.5   |  29  |  1.0    Ca    8.4      22 Jan 2023 05:51  Phos  3.6     01-22  Mg     1.7     01-22      ABG - ( 21 Jan 2023 06:32 )  pH, Arterial: 7.42  pH, Blood: x     /  pCO2: 43    /  pO2: 360   / HCO3: 28    / Base Excess: 3.0   /  SaO2: 99.3                  Lactate Trend    CARDIAC MARKERS ( 22 Jan 2023 05:51 )  x     / 0.07 ng/mL / x     / x     / x          CAPILLARY BLOOD GLUCOSE      POCT Blood Glucose.: 164 mg/dL (22 Jan 2023 11:23)        RADIOLOGY & ADDITIONAL TESTS:    Imaging Personally Reviewed:  [ ] YES  [ ] NO    HEALTH ISSUES - PROBLEM Dx:  Cellulitis of hand            PHYSICAL EXAM:  GENERAL: NAD, well-developed.  HEAD:  Atraumatic, Normocephalic.  EYES: EOMI, PERRLA, conjunctiva and sclera clear.  NECK: Supple, No JVD.  CHEST/LUNG: bilateral rales.  HEART: Regular rate and rhythm; S1 S2.   ABDOMEN: Soft, Nontender, Nondistended; Bowel sounds present.  EXTREMITIES:  multiple wound on left leg with dressing, right AKA.   PSYCH: AAOx3.  NEUROLOGY: non-focal.  SKIN: No rashes or lesions.

## 2023-01-22 NOTE — PROGRESS NOTE ADULT - ASSESSMENT
68 yo M w HTN, HLD, CAD/MI s/p CABG/VERONICA, severe HF/ICM s/p AICD, Uncontrolled DM w peripheral neuropathy, R AKA, MRSA bacteremia, ?Psoriasis (on topical steroids), L ankle surgery with fixation hardware presents for recurrent cellulitis.     A/P:   Acute Hypoxic Respiratory failure:   Pulmonary Edema:   Acute HFrEF: s/p AICD.   Episode of hypoxia 1/21, Pro-BNP 40650  CXR showed bilateral pleural effusion, bilateral infiltrates.   Echo showed severe reduced LVEF <15%,   Started on Lasix 40mg IV BID.   Fluid restriction 1.2L/day. Low sodium diet, daily weight.     Multiple Left lower extremity wound: Multiple small ulcer, erosion, Bulla.   Bilateral Hand erosions and excoriations.   No evidence of cellulitis.   Arterial duplex 1/11 was negative.   Patient was evaluated by podiatry, ID, dermatology and Burn.   s/p brief antibiotics, discontinued.   Blood cx no growth.   continue current wound care.     Toxic metabolic encephalopathy  From Hypoglycemia vs hospital acquired delirium   Head CT was negative. EEG unremarkable     CAD s/p CABG, PCI and VERONICA  No chest pain, Troponin 0.05 stable.   Cardiac cath Dec 2018.   Continue ASA, Effient, Metoprolol and Lipitor 80mg daily.     DM type 2: uncontrolled, ( last A1c 12) formerly on insulin pump  HbA1c: 9.8%  FS is controlled, Hypoglycemia resolved.   Can restart Lantus once oral intake improves.     Hypothyroidism  Continue Synthroid, TSH 5.61, FT4 1.2    Depression/Anxiety: continue duloxetine 30mg TID.   Restless leg syndrome:  on valium 2mg bid prn at home- held here     Celiac Disease:   Nutrition f/u and vitamin replacement prn    DVT PPX: LVX 40 QD  GI Prophylaxis: PPI QD     #Progress Note Handoff  Pending (specify): clinical improvement  Family discussion: updated at bedside  Disposition: unknown

## 2023-01-23 NOTE — PROGRESS NOTE ADULT - SUBJECTIVE AND OBJECTIVE BOX
Patient is a 67y old  Male who presents with a chief complaint of recurrent cellulitis (23 Jan 2023 09:39)      INTERVAL HPI/OVERNIGHT EVENTS:  None    FAMILY HISTORY:  Family history of heart disease (Mother)    Family history of allergies  daughter      T(C): 36.1 (01-23-23 @ 05:00), Max: 36.6 (01-22-23 @ 20:33)  HR: 90 (01-23-23 @ 05:00) (88 - 90)  BP: 121/63 (01-23-23 @ 05:00) (113/68 - 121/63)  RR: 18 (01-23-23 @ 05:00) (18 - 18)  SpO2: 99% (01-23-23 @ 05:00) (99% - 100%)  Wt(kg): --Vital Signs Last 24 Hrs  T(C): 36.1 (23 Jan 2023 05:00), Max: 36.6 (22 Jan 2023 20:33)  T(F): 97 (23 Jan 2023 05:00), Max: 97.9 (22 Jan 2023 20:33)  HR: 90 (23 Jan 2023 05:00) (88 - 90)  BP: 121/63 (23 Jan 2023 05:00) (113/68 - 121/63)  BP(mean): 84 (23 Jan 2023 05:00) (84 - 84)  RR: 18 (23 Jan 2023 05:00) (18 - 18)  SpO2: 99% (23 Jan 2023 05:00) (99% - 100%)    Parameters below as of 23 Jan 2023 05:00  Patient On (Oxygen Delivery Method): nasal cannula  O2 Flow (L/min): 4      PHYSICAL EXAM:  GENERAL: confused  HEAD:  Atraumatic, Normocephalic  EYES: EOMI, PERRLA, conjunctiva and sclera clear  ENMT: No tonsillar erythema, exudates, or enlargement; Moist mucous membranes, Good dentition, No lesions  NECK: Supple, No JVD, Normal thyroid  NERVOUS SYSTEM:  confused  CHEST/LUNG: Clear to percussion bilaterally; No rales, rhonchi, wheezing, or rubs  HEART: Regular rate and rhythm; No murmurs, rubs, or gallops  ABDOMEN: Soft, Nontender, Nondistended; Bowel sounds present  EXTREMITIES: R AKA  LYMPH: No lymphadenopathy noted  SKIN: No rashes or lesions      aspirin enteric coated 81 milliGRAM(s) Oral daily  atorvastatin 80 milliGRAM(s) Oral at bedtime  bacitracin   Ointment 1 Application(s) Topical two times a day  chlorhexidine 2% Cloths 1 Application(s) Topical <User Schedule>  clobetasol 0.05% Cream 1 Application(s) Topical two times a day  clotrimazole 1% Cream 1 Application(s) Topical two times a day  DULoxetine 30 milliGRAM(s) Oral <User Schedule>  enoxaparin Injectable 40 milliGRAM(s) SubCutaneous every 24 hours  ferrous    sulfate 325 milliGRAM(s) Oral daily  furosemide   Injectable 40 milliGRAM(s) IV Push two times a day  glucagon  Injectable 1 milliGRAM(s) IntraMuscular once  insulin lispro (ADMELOG) corrective regimen sliding scale   SubCutaneous three times a day before meals  latanoprost 0.005% Ophthalmic Solution 1 Drop(s) Both EYES at bedtime  levothyroxine 25 MICROGram(s) Oral daily  magnesium sulfate  IVPB 2 Gram(s) IV Intermittent every 4 hours  metoprolol succinate ER 25 milliGRAM(s) Oral daily  multivitamin 1 Tablet(s) Oral daily  mupirocin 2% Ointment 1 Application(s) Topical two times a day  naloxone Injectable 1 milliGRAM(s) IV Push once  pantoprazole    Tablet 40 milliGRAM(s) Oral before breakfast  prasugrel 10 milliGRAM(s) Oral daily  QUEtiapine 50 milliGRAM(s) Oral at bedtime      HEALTH ISSUES - PROBLEM Dx:  Cellulitis of hand

## 2023-01-23 NOTE — PROGRESS NOTE ADULT - SUBJECTIVE AND OBJECTIVE BOX
FLOWER MARISCAL  67y  Male      Patient is a 67y old  Male who presents with a chief complaint of recurrent cellulitis (23 Jan 2023 10:53)      INTERVAL HPI/OVERNIGHT EVENTS:  he feels ok, no overnight events.   Vital Signs Last 24 Hrs  T(C): 36.1 (23 Jan 2023 05:00), Max: 36.6 (22 Jan 2023 20:33)  T(F): 97 (23 Jan 2023 05:00), Max: 97.9 (22 Jan 2023 20:33)  HR: 80 (23 Jan 2023 14:06) (75 - 90)  BP: 102/58 (23 Jan 2023 14:06) (102/58 - 145/67)  BP(mean): 84 (23 Jan 2023 05:00) (84 - 84)  RR: 18 (23 Jan 2023 05:00) (18 - 18)  SpO2: 96% (23 Jan 2023 08:00) (96% - 99%)    Parameters below as of 23 Jan 2023 08:00  Patient On (Oxygen Delivery Method): nasal cannula  O2 Flow (L/min): 3        01-22-23 @ 07:01  -  01-23-23 @ 07:00  --------------------------------------------------------  IN: 0 mL / OUT: 2250 mL / NET: -2250 mL    01-23-23 @ 07:01  -  01-23-23 @ 14:12  --------------------------------------------------------  IN: 0 mL / OUT: 1 mL / NET: -1 mL            Consultant(s) Notes Reviewed:  [x ] YES  [ ] NO          MEDICATIONS  (STANDING):  aspirin enteric coated 81 milliGRAM(s) Oral daily  atorvastatin 80 milliGRAM(s) Oral at bedtime  bacitracin   Ointment 1 Application(s) Topical two times a day  chlorhexidine 2% Cloths 1 Application(s) Topical <User Schedule>  clobetasol 0.05% Cream 1 Application(s) Topical two times a day  clotrimazole 1% Cream 1 Application(s) Topical two times a day  DULoxetine 30 milliGRAM(s) Oral <User Schedule>  enoxaparin Injectable 40 milliGRAM(s) SubCutaneous every 24 hours  ferrous    sulfate 325 milliGRAM(s) Oral daily  furosemide   Injectable 40 milliGRAM(s) IV Push two times a day  glucagon  Injectable 1 milliGRAM(s) IntraMuscular once  insulin lispro (ADMELOG) corrective regimen sliding scale   SubCutaneous three times a day before meals  latanoprost 0.005% Ophthalmic Solution 1 Drop(s) Both EYES at bedtime  levothyroxine 25 MICROGram(s) Oral daily  magnesium sulfate  IVPB 2 Gram(s) IV Intermittent every 4 hours  metoprolol succinate ER 25 milliGRAM(s) Oral daily  multivitamin 1 Tablet(s) Oral daily  mupirocin 2% Ointment 1 Application(s) Topical two times a day  naloxone Injectable 1 milliGRAM(s) IV Push once  pantoprazole    Tablet 40 milliGRAM(s) Oral before breakfast  prasugrel 10 milliGRAM(s) Oral daily  QUEtiapine 50 milliGRAM(s) Oral at bedtime    MEDICATIONS  (PRN):      LABS                          10.7   10.90 )-----------( 375      ( 23 Jan 2023 06:04 )             37.1     01-23    139  |  98  |  26<H>  ----------------------------<  218<H>  4.3   |  32  |  1.0    Ca    8.5      23 Jan 2023 06:04  Phos  2.9     01-23  Mg     1.6     01-23            Lactate Trend    CARDIAC MARKERS ( 22 Jan 2023 05:51 )  x     / 0.07 ng/mL / x     / x     / x          CAPILLARY BLOOD GLUCOSE      POCT Blood Glucose.: 203 mg/dL (23 Jan 2023 12:04)        RADIOLOGY & ADDITIONAL TESTS:    Imaging Personally Reviewed:  [ ] YES  [ ] NO    HEALTH ISSUES - PROBLEM Dx:  Cellulitis of hand            PHYSICAL EXAM:  GENERAL: NAD, well-developed.  HEAD:  Atraumatic, Normocephalic.  EYES: EOMI, PERRLA, conjunctiva and sclera clear.  NECK: Supple, No JVD.  CHEST/LUNG: bilateral rales.  HEART: Regular rate and rhythm; S1 S2.   ABDOMEN: Soft, Nontender, Nondistended; Bowel sounds present.  EXTREMITIES:  multiple wound on left leg with dressing, right AKA.   PSYCH: AAOx3.  NEUROLOGY: non-focal.  SKIN: No rashes or lesions.

## 2023-01-23 NOTE — PROGRESS NOTE ADULT - SUBJECTIVE AND OBJECTIVE BOX
SUBJ: Patient seen and examined. No events overnight.       MEDICATIONS  (STANDING):  aspirin enteric coated 81 milliGRAM(s) Oral daily  atorvastatin 80 milliGRAM(s) Oral at bedtime  bacitracin   Ointment 1 Application(s) Topical two times a day  chlorhexidine 2% Cloths 1 Application(s) Topical <User Schedule>  clobetasol 0.05% Cream 1 Application(s) Topical two times a day  clotrimazole 1% Cream 1 Application(s) Topical two times a day  DULoxetine 30 milliGRAM(s) Oral <User Schedule>  enoxaparin Injectable 40 milliGRAM(s) SubCutaneous every 24 hours  ferrous    sulfate 325 milliGRAM(s) Oral daily  furosemide   Injectable 40 milliGRAM(s) IV Push two times a day  glucagon  Injectable 1 milliGRAM(s) IntraMuscular once  insulin lispro (ADMELOG) corrective regimen sliding scale   SubCutaneous three times a day before meals  latanoprost 0.005% Ophthalmic Solution 1 Drop(s) Both EYES at bedtime  levothyroxine 25 MICROGram(s) Oral daily  magnesium sulfate  IVPB 2 Gram(s) IV Intermittent every 4 hours  metoprolol succinate ER 25 milliGRAM(s) Oral daily  multivitamin 1 Tablet(s) Oral daily  mupirocin 2% Ointment 1 Application(s) Topical two times a day  naloxone Injectable 1 milliGRAM(s) IV Push once  pantoprazole    Tablet 40 milliGRAM(s) Oral before breakfast  prasugrel 10 milliGRAM(s) Oral daily  QUEtiapine 50 milliGRAM(s) Oral at bedtime    MEDICATIONS  (PRN):            Vital Signs Last 24 Hrs  T(C): 36.1 (23 Jan 2023 05:00), Max: 36.6 (22 Jan 2023 20:33)  T(F): 97 (23 Jan 2023 05:00), Max: 97.9 (22 Jan 2023 20:33)  HR: 90 (23 Jan 2023 05:00) (88 - 90)  BP: 121/63 (23 Jan 2023 05:00) (113/68 - 121/63)  BP(mean): 84 (23 Jan 2023 05:00) (84 - 84)  RR: 18 (23 Jan 2023 05:00) (18 - 18)  SpO2: 99% (23 Jan 2023 05:00) (99% - 100%)    Parameters below as of 23 Jan 2023 05:00  Patient On (Oxygen Delivery Method): nasal cannula  O2 Flow (L/min): 4       REVIEW OF SYSTEMS:  CONSTITUTIONAL: No fever  NECK: No pain or stiffness  CARDIOVASCULAR: patient denies chest pain, shortness of breath or palpitations .  Repiratory: No cough or wheezing.  NEUROLOGICAL: No focal deficits to report.  GI: no BRBPR, no N,V,diarrhea.    PSYCHIATRY: normal mood and affect  HEENT: no nasal discharge, no ecchymosis  SKIN: no ecchymosis, no breakdown  MUSCULOSKELETAL: Full range of motion x4.      PHYSICAL EXAM:  GENERAL: NAD  HEAD:  Atraumatic, Normocephalic  NECK: Supple, No JVD  NERVOUS SYSTEM:  Alert & Oriented X3, Good concentration  CHEST/LUNG: Clear to anterior auscultation bilaterally  HEART: Regular rate and rhythm  ABDOMEN: Soft, Nontender, Nondistended;   EXTREMITIES:  No  edema    	  TELEMETRY:    NSVT  LABS:                        10.7   10.90 )-----------( 375      ( 23 Jan 2023 06:04 )             37.1     01-23    139  |  98  |  26<H>  ----------------------------<  218<H>  4.3   |  32  |  1.0    Ca    8.5      23 Jan 2023 06:04  Phos  2.9     01-23  Mg     1.6     01-23      CARDIAC MARKERS ( 22 Jan 2023 05:51 )  x     / 0.07 ng/mL / x     / x     / x              I&O's Summary    22 Jan 2023 07:01  -  23 Jan 2023 07:00  --------------------------------------------------------  IN: 0 mL / OUT: 2250 mL / NET: -2250 mL      BNP  RADIOLOGY & ADDITIONAL STUDIES:    IMPRESSION AND PLAN:    Ischemic cardiomyopathy   Clinically euvolumic  - Management as per primary team  - Continue Diuresis and follow I/ O   - Please replace Mg  - Will follow

## 2023-01-23 NOTE — PROGRESS NOTE ADULT - ASSESSMENT
66 yo M w HTN, HLD, CAD/MI s/p CABG/VERONICA, severe HF/ICM s/p AICD, Uncontrolled DM w peripheral neuropathy, R AKA, MRSA bacteremia, ?Psoriasis (on topical steroids), L ankle surgery with fixation hardware presents for recurrent cellulitis.     A/P:   Acute Hypoxic Respiratory failure:   Pulmonary Edema:   Acute HFrEF: s/p AICD.   Episode of hypoxia 1/21, Pro-BNP 40379  CXR showed bilateral pleural effusion, bilateral infiltrates.   Echo showed severe reduced LVEF <15%,   Started on Lasix 40mg IV BID.   Fluid restriction 1.2L/day. Low sodium diet, daily weight.     Multiple Left lower extremity wound: Multiple small ulcer, erosion, Bulla.   Bilateral Hand erosions and excoriations.   No evidence of cellulitis.   Arterial duplex 1/11 was negative.   Patient was evaluated by podiatry, ID, dermatology and Burn.   s/p brief antibiotics, discontinued.   Blood cx no growth.   continue current wound care.     Toxic metabolic encephalopathy  From Hypoglycemia vs hospital acquired delirium vs digoxin toxicity  Head CT was negative. EEG unremarkable   - hold digoxin  - fu digoxin level    CAD s/p CABG, PCI and VERONICA  No chest pain, Troponin 0.05 stable.   Cardiac cath Dec 2018.   Continue ASA, Effient, Metoprolol and Lipitor 80mg daily.     DM type 2: uncontrolled, ( last A1c 12) formerly on insulin pump  HbA1c: 9.8%  FS is controlled, Hypoglycemia resolved.   Can restart Lantus once oral intake improves.     Hypothyroidism  Continue Synthroid, TSH 5.61, FT4 1.2    Depression/Anxiety: continue duloxetine 30mg TID.   Restless leg syndrome:  on valium 2mg bid prn at home- held here     Celiac Disease:   Nutrition f/u and vitamin replacement prn    DVT PPX: LVX 40 QD  GI Prophylaxis: PPI QD     #Progress Note Handoff  Pending (specify): clinical improvement  Family discussion: updated at bedside  Disposition: unknown

## 2023-01-23 NOTE — PROGRESS NOTE ADULT - ASSESSMENT
68 yo M w HTN, HLD, CAD/MI s/p CABG/VERONICA, severe HF/ICM s/p AICD, Uncontrolled DM w peripheral neuropathy, R AKA, MRSA bacteremia, ?Psoriasis (on topical steroids), L ankle surgery with fixation hardware presents for recurrent cellulitis.     A/P:   Acute Hypoxic Respiratory failure:   Pulmonary Edema:   Acute HFrEF: s/p AICD.   Episode of hypoxia 1/21, Pro-BNP 08162  CXR showed bilateral pleural effusion, bilateral infiltrates.   Echo showed severe reduced LVEF <15%,   Started on Lasix 40mg IV BID. Continue Metoprolol 25mg daily.   Digoxin level 2.5 1/13, repeated today 1.4, reduce Digoxin to 0.125mg daily.   Fluid restriction 1.2L/day. Low sodium diet, daily weight.     Multiple Left lower extremity wound: Multiple small ulcer, erosion, Bulla.   Bilateral Hand erosions and excoriations.   No evidence of cellulitis.   Arterial duplex 1/11 was negative.   Patient was evaluated by podiatry, ID, dermatology and Burn.   s/p brief antibiotics, discontinued.   Blood cx no growth.   Continue current wound care.     Toxic metabolic encephalopathy  From Hypoglycemia vs hospital acquired delirium   Head CT was negative. EEG unremarkable     CAD s/p CABG, PCI and VERONICA  No chest pain, Troponin 0.05 stable.   Cardiac cath Dec 2018.   Continue ASA, Effient, Metoprolol and Lipitor 80mg daily.     Iron deficiency anemia:   Hb stable. Continue Ferrous sulfate.     DM type 2: uncontrolled, ( last A1c 12) formerly on insulin pump  HbA1c: 9.8%  FS is controlled, Hypoglycemia resolved.   Can restart Lantus once oral intake improves.     Hypothyroidism  Continue Synthroid, TSH 5.61, FT4 1.2    Depression/Anxiety: continue duloxetine 30mg TID.   Restless leg syndrome:  on valium 2mg bid prn at home- held here     Celiac Disease:   Nutrition f/u and vitamin replacement prn    DVT PPX: LVX 40 QD  GI Prophylaxis: PPI QD     #Progress Note Handoff  Pending (specify): clinical improvement  Family discussion: updated at bedside  Disposition: likely home with home care.

## 2023-01-24 NOTE — PROGRESS NOTE ADULT - SUBJECTIVE AND OBJECTIVE BOX
LOIDA FLOWER  67y  Male      Patient is a 67y old  Male who presents with a chief complaint of recurrent cellulitis (24 Jan 2023 06:43)      INTERVAL HPI/OVERNIGHT EVENTS:  patient was confused and agitated last night, more quite in AM. No chest pain.   Vital Signs Last 24 Hrs  T(C): 35.9 (24 Jan 2023 04:52), Max: 35.9 (24 Jan 2023 04:52)  T(F): 96.7 (24 Jan 2023 04:52), Max: 96.7 (24 Jan 2023 04:52)  HR: 85 (24 Jan 2023 04:52) (80 - 85)  BP: 110/66 (24 Jan 2023 04:52) (102/58 - 110/66)  BP(mean): --  RR: 18 (24 Jan 2023 04:52) (18 - 18)  SpO2: --          01-23-23 @ 07:01  -  01-24-23 @ 07:00  --------------------------------------------------------  IN: 0 mL / OUT: 1001 mL / NET: -1001 mL    01-24-23 @ 07:01  - 01-24-23 @ 12:15  --------------------------------------------------------  IN: 100 mL / OUT: 900 mL / NET: -800 mL            Consultant(s) Notes Reviewed:  [x ] YES  [ ] NO          MEDICATIONS  (STANDING):  aspirin enteric coated 81 milliGRAM(s) Oral daily  atorvastatin 80 milliGRAM(s) Oral at bedtime  bacitracin   Ointment 1 Application(s) Topical two times a day  chlorhexidine 2% Cloths 1 Application(s) Topical <User Schedule>  clobetasol 0.05% Cream 1 Application(s) Topical two times a day  clotrimazole 1% Cream 1 Application(s) Topical two times a day  digoxin     Tablet 125 MICROGram(s) Oral daily  DULoxetine 30 milliGRAM(s) Oral <User Schedule>  enoxaparin Injectable 40 milliGRAM(s) SubCutaneous every 24 hours  ferrous    sulfate 325 milliGRAM(s) Oral daily  furosemide   Injectable 40 milliGRAM(s) IV Push two times a day  glucagon  Injectable 1 milliGRAM(s) IntraMuscular once  insulin lispro (ADMELOG) corrective regimen sliding scale   SubCutaneous three times a day before meals  latanoprost 0.005% Ophthalmic Solution 1 Drop(s) Both EYES at bedtime  levothyroxine 25 MICROGram(s) Oral daily  metoprolol succinate ER 25 milliGRAM(s) Oral daily  multivitamin 1 Tablet(s) Oral daily  mupirocin 2% Ointment 1 Application(s) Topical two times a day  naloxone Injectable 1 milliGRAM(s) IV Push once  pantoprazole    Tablet 40 milliGRAM(s) Oral before breakfast  prasugrel 10 milliGRAM(s) Oral daily  QUEtiapine 50 milliGRAM(s) Oral at bedtime    MEDICATIONS  (PRN):      LABS                          11.0   8.05  )-----------( 349      ( 24 Jan 2023 06:13 )             37.9     01-24    138  |  95<L>  |  24<H>  ----------------------------<  171<H>  4.3   |  31  |  1.0    Ca    8.6      24 Jan 2023 06:13  Phos  2.9     01-23  Mg     2.1     01-24    TPro  6.7  /  Alb  3.1<L>  /  TBili  1.1  /  DBili  x   /  AST  25  /  ALT  22  /  AlkPhos  135<H>  01-24          Lactate Trend        CAPILLARY BLOOD GLUCOSE      POCT Blood Glucose.: 150 mg/dL (24 Jan 2023 10:33)        RADIOLOGY & ADDITIONAL TESTS:    Imaging Personally Reviewed:  [ ] YES  [ ] NO    HEALTH ISSUES - PROBLEM Dx:  Cellulitis of hand            PHYSICAL EXAM:  GENERAL: NAD, well-developed.  HEAD:  Atraumatic, Normocephalic.  EYES: EOMI, PERRLA, conjunctiva and sclera clear.  NECK: Supple, No JVD.  CHEST/LUNG: bilateral rales.  HEART: Regular rate and rhythm; S1 S2.   ABDOMEN: Soft, Nontender, Nondistended; Bowel sounds present.  EXTREMITIES:  multiple wound on left leg with dressing, right AKA.   PSYCH: AAOx3.  NEUROLOGY: non-focal.  SKIN: No rashes or lesions.

## 2023-01-24 NOTE — PROGRESS NOTE ADULT - ASSESSMENT
68 yo M w HTN, HLD, CAD/MI s/p CABG/VERONICA, severe HF/ICM s/p AICD, Uncontrolled DM w peripheral neuropathy, R AKA, MRSA bacteremia, ?Psoriasis (on topical steroids), L ankle surgery with fixation hardware presents for recurrent cellulitis.     A/P:   Acute Hypoxic Respiratory failure:   Pulmonary Edema:   Acute HFrEF: s/p AICD.   Episode of hypoxia 1/21, Pro-BNP 74000  CXR showed bilateral pleural effusion, bilateral infiltrates.   Echo showed severe reduced LVEF <15%,   Continue Lasix 40mg IV BID. Can switch to PO tomorrow Continue Metoprolol 25mg daily.   Digoxin level 2.5 1/13, repeated 1/23: 1.4, reduce Digoxin to 0.125mg daily.   Fluid restriction 1.2L/day. Low sodium diet, daily weight.     Multiple Left lower extremity wound: Multiple small ulcer, erosion, Bulla.   Bilateral Hand erosions and excoriations.   No evidence of cellulitis.   Arterial duplex 1/11 was negative.   Patient was evaluated by podiatry, ID, dermatology and Burn.   s/p brief antibiotics, discontinued.   Blood cx no growth.   Continue current wound care.     Toxic metabolic encephalopathy  From Hypoglycemia vs hospital acquired delirium   Head CT was negative. EEG unremarkable     CAD s/p CABG, PCI and VERONICA  No chest pain, Troponin 0.05 stable.   Cardiac cath Dec 2018.   Continue ASA, Effient, Metoprolol and Lipitor 80mg daily.     Iron deficiency anemia:   Hb stable. Continue Ferrous sulfate.     DM type 2: uncontrolled, ( last A1c 12) formerly on insulin pump  HbA1c: 9.8%  FS is controlled, Hypoglycemia resolved.   Can restart Lantus once oral intake improves.     Hypothyroidism  Continue Synthroid, TSH 5.61, FT4 1.2    Depression/Anxiety: continue duloxetine 30mg TID.   Restless leg syndrome:  on valium 2mg bid prn at home- held here     Celiac Disease:   Nutrition f/u and vitamin replacement prn    DVT PPX: LVX 40 QD  GI Prophylaxis: PPI QD     #Progress Note Handoff  Pending (specify): clinical improvement  Family discussion: updated at bedside  Disposition: likely home with home care.

## 2023-01-24 NOTE — PROGRESS NOTE ADULT - ASSESSMENT
68 yo M w HTN, HLD, CAD/MI s/p CABG/VERONICA, severe HF/ICM s/p AICD, Uncontrolled DM w peripheral neuropathy, R AKA, MRSA bacteremia, ?Psoriasis (on topical steroids), L ankle surgery with fixation hardware presents for recurrent cellulitis.     #Acute Hypoxic Respiratory failure  #Pulmonary Edema  #Acute HFrEF: s/p AICD  - Episode of hypoxia 1/21, Pro-BNP 58408  - CXR showed bilateral pleural effusion, bilateral infiltrates.   - Echo showed severe reduced LVEF <15%,   - Started on Lasix 40mg IV BID.   - Fluid restriction 1.2L/day. Low sodium diet, daily weight.     #Toxic metabolic encephalopathy  #From Hypoglycemia vs hospital acquired delirium vs digoxin toxicity  - Head CT was negative. EEG unremarkable   - hold digoxin  - fu digoxin level    #Multiple Left lower extremity wound: Multiple small ulcer, erosion, Bulla.   #Bilateral Hand erosions and excoriations.   - No evidence of cellulitis.   - Arterial duplex 1/11 was negative.   - Patient was evaluated by podiatry, ID, dermatology and Burn.   - s/p brief antibiotics, discontinued.   - Blood cx no growth.   - continue current wound care.     #CAD s/p CABG, PCI and VERONICA  - No chest pain, Troponin 0.05 stable.   - Cardiac cath Dec 2018.   - Continue ASA, Effient, Metoprolol and Lipitor 80mg daily.     #DM type 2: uncontrolled, ( last A1c 12) formerly on insulin pump  - HbA1c: 9.8%  - FS is controlled, Hypoglycemia resolved.   - Can restart Lantus once oral intake improves.     #Hypothyroidism  - TSH 5.61, FT4 1.2  - Continue Synthroid    #Depression/Anxiety  - continue duloxetine 30mg TID.   - Restless leg syndrome:  on valium 2mg bid prn at home- held here     #Celiac Disease  - Nutrition f/u and vitamin replacement prn    #Other  DVT PPX: LVX 40 QD  GI Prophylaxis: PPI QD   Diet: DASH/TLC carb consistent, 1.5L fluid restricted  Activity: IAT  Pending (specify): clinical improvement  Family discussion: updated at bedside  Disposition: unknown 66 yo M w HTN, HLD, CAD/MI s/p CABG/VERONICA, severe HF/ICM s/p AICD, Uncontrolled DM w peripheral neuropathy, R AKA, MRSA bacteremia, ?Psoriasis (on topical steroids), L ankle surgery with fixation hardware presents for recurrent cellulitis.     #Acute Hypoxic Respiratory failure  #Pulmonary Edema  #Acute HFrEF: s/p AICD  - Episode of hypoxia 1/21, Pro-BNP 96308  - CXR showed bilateral pleural effusion, bilateral infiltrates.   - Echo showed severe reduced LVEF <15%,   - Started on Lasix 40mg IV BID.   - Fluid restriction 1.5L/day. Low sodium diet, daily weight.     #Toxic metabolic encephalopathy  #From Hypoglycemia vs hospital acquired delirium vs digoxin toxicity  - Head CT was negative. EEG unremarkable   - hold digoxin  - digoxin level 1/23: 1.4 (N)    #Multiple Left lower extremity wound: Multiple small ulcer, erosion, Bulla.   #Bilateral Hand erosions and excoriations.   - No evidence of cellulitis.   - Arterial duplex 1/11 was negative.   - Patient was evaluated by podiatry, ID, dermatology and Burn.   - s/p brief antibiotics, discontinued.   - Blood cx no growth.   - continue current wound care.     #CAD s/p CABG, PCI and VERONICA  - No chest pain, Troponin 0.05 stable.   - Cardiac cath Dec 2018.   - Continue ASA, Effient, Metoprolol and Lipitor 80mg daily.     #DM type 2: uncontrolled, ( last A1c 12) formerly on insulin pump  - HbA1c: 9.8%  - FS is controlled, Hypoglycemia resolved.   - Can restart Lantus once oral intake improves.     #Hypothyroidism  - TSH 5.61, FT4 1.2  - Continue Synthroid    #Depression/Anxiety  - continue duloxetine 30mg TID.   - Restless leg syndrome:  on valium 2mg bid prn at home- held here     #Celiac Disease  - Nutrition f/u and vitamin replacement prn    #Other  DVT PPX: LVX 40 QD  GI Prophylaxis: PPI QD   Diet: DASH/TLC carb consistent, 1.5L fluid restricted  Activity: IAT  Pending (specify): clinical improvement  Family discussion: updated at bedside  Disposition: unknown 66 yo M w HTN, HLD, CAD/MI s/p CABG/VERONICA, severe HF/ICM s/p AICD, Uncontrolled DM w peripheral neuropathy, R AKA, MRSA bacteremia, ?Psoriasis (on topical steroids), L ankle surgery with fixation hardware presents for recurrent cellulitis.     #Acute Hypoxic Respiratory failure  #Pulmonary Edema  #Acute HFrEF: s/p AICD  - Episode of hypoxia 1/21, Pro-BNP 09881  - CXR showed bilateral pleural effusion, bilateral infiltrates.   - Echo showed severe reduced LVEF <15%,   - Started on Lasix 40mg IV BID.   - Fluid restriction 1.5L/day. Low sodium diet, daily weight.     #Toxic metabolic encephalopathy  #From Hypoglycemia vs hospital acquired delirium vs digoxin toxicity  - Head CT was negative. EEG unremarkable   - hold digoxin  - digoxin level 1/23: 1.4 (N)    #Multiple Left lower extremity wound: Multiple small ulcer, erosion, Bulla.   #Bilateral Hand erosions and excoriations.   - No evidence of cellulitis.   - Arterial duplex 1/11 was negative.   - Patient was evaluated by podiatry, ID, dermatology and Burn.   - s/p brief antibiotics, discontinued.   - Blood cx no growth.   - continue current wound care.     #CAD s/p CABG, PCI and VERONICA  - No chest pain, Troponin 0.05 stable.   - Cardiac cath Dec 2018.   - Continue ASA, Effient, Metoprolol and Lipitor 80mg daily.     #DM type 2: uncontrolled, ( last A1c 12) formerly on insulin pump  - HbA1c: 9.8%  - FS is controlled, Hypoglycemia resolved.   - Can restart Lantus once oral intake improves.     #Hypothyroidism  - TSH 5.61, FT4 1.2  - Continue Synthroid    #Depression/Anxiety  - continue duloxetine 30mg TID.   - Restless leg syndrome:  on valium 2mg bid prn at home- held here     #Celiac Disease  - Nutrition f/u and vitamin replacement prn    #Other  DVT PPX: LVX 40 QD  GI Prophylaxis: PPI QD   Diet: DASH/TLC carb consistent, 1.5L fluid restricted  Activity: IAT  Pending (specify): clinical improvement  Family discussion: updated at bedside  Disposition: prepare for d/c 24-48h

## 2023-01-25 NOTE — PROGRESS NOTE ADULT - ASSESSMENT
68 yo M w HTN, HLD, CAD/MI s/p CABG/VERONICA, severe HF/ICM s/p AICD, Uncontrolled DM w peripheral neuropathy, R AKA, MRSA bacteremia, ?Psoriasis (on topical steroids), L ankle surgery with fixation hardware presents for recurrent cellulitis.     #Acute Hypoxic Respiratory failure  #Pulmonary Edema  #Acute HFrEF: s/p AICD  - Episode of hypoxia 1/21, Pro-BNP 95880  - CXR showed bilateral pleural effusion, bilateral infiltrates.   - Echo showed severe reduced LVEF <15%,   - Started on Lasix 40mg IV BID.   - Fluid restriction 1.5L/day. Low sodium diet, daily weight.     #Toxic metabolic encephalopathy  #From Hypoglycemia vs hospital acquired delirium vs digoxin toxicity  - Head CT was negative. EEG unremarkable   - hold digoxin  - digoxin level 1/23: 1.4 (N)    #Multiple Left lower extremity wound: Multiple small ulcer, erosion, Bulla.   #Bilateral Hand erosions and excoriations.   - No evidence of cellulitis.   - Arterial duplex 1/11 was negative.   - Patient was evaluated by podiatry, ID, dermatology and Burn.   - s/p brief antibiotics, discontinued.   - Blood cx no growth.   - continue current wound care.     #CAD s/p CABG, PCI and VERONICA  - No chest pain, Troponin 0.05 stable.   - Cardiac cath Dec 2018.   - Continue ASA, Effient, Metoprolol and Lipitor 80mg daily.     #DM type 2: uncontrolled, ( last A1c 12) formerly on insulin pump  - HbA1c: 9.8%  - FS is controlled, Hypoglycemia resolved.   - Can restart Lantus once oral intake improves.     #Hypothyroidism  - TSH 5.61, FT4 1.2  - Continue Synthroid    #Depression/Anxiety  - continue duloxetine 30mg TID.   - Restless leg syndrome:  on valium 2mg bid prn at home- held here     #Celiac Disease  - Nutrition f/u and vitamin replacement prn    #Other  DVT PPX: LVX 40 QD  GI Prophylaxis: PPI QD   Diet: DASH/TLC carb consistent, 1.5L fluid restricted  Activity: IAT  Pending (specify): clinical improvement  Family discussion: updated at bedside  Disposition: prepare for d/c 24-48h

## 2023-01-25 NOTE — CHART NOTE - NSCHARTNOTEFT_GEN_A_CORE
NUTRITION SUPPORT TEAM  -  PROGRESS NOTE     Interval Events:        REVIEW OF SYSTEMS:  Negative except as noted above.     VITALS:  T(F): 97.5 (01-25 @ 12:10), Max: 97.5 (01-25 @ 12:10)  HR: 68 (01-25 @ 12:10) (68 - 88)  BP: 106/61 (01-25 @ 12:10) (106/61 - 112/65)  RR: 18 (01-25 @ 12:10) (18 - 18)  SpO2: 100% (01-25 @ 12:10) (100% - 100%)    HEIGHT/WEIGHT/BMI:   Height (cm): 185.4 (01-13), 185.4 (06-22), 185.4 (06-15)  Weight (kg): 66.2 (01-25), 79 (06-22), 77.1 (06-15)  BMI (kg/m2): 19.3 (01-25), 23 (01-13), 23 (06-22), 22.4 (06-15)    I/Os:     01-24-23 @ 07:01  -  01-25-23 @ 07:00  --------------------------------------------------------  IN:    Oral Fluid: 100 mL  Total IN: 100 mL    OUT:    Incontinent per Condom Catheter (mL): 2400 mL  Total OUT: 2400 mL    Total NET: -2300 mL    MEDICATIONS:   aspirin enteric coated 81 milliGRAM(s) Oral daily  atorvastatin 80 milliGRAM(s) Oral at bedtime  bacitracin   Ointment 1 Application(s) Topical two times a day  chlorhexidine 2% Cloths 1 Application(s) Topical <User Schedule>  clobetasol 0.05% Cream 1 Application(s) Topical two times a day  clotrimazole 1% Cream 1 Application(s) Topical two times a day  digoxin     Tablet 125 MICROGram(s) Oral daily  DULoxetine 30 milliGRAM(s) Oral <User Schedule>  enoxaparin Injectable 40 milliGRAM(s) SubCutaneous every 24 hours  ferrous    sulfate 325 milliGRAM(s) Oral daily  furosemide   Injectable 40 milliGRAM(s) IV Push two times a day  glucagon  Injectable 1 milliGRAM(s) IntraMuscular once  insulin lispro (ADMELOG) corrective regimen sliding scale   SubCutaneous three times a day before meals  latanoprost 0.005% Ophthalmic Solution 1 Drop(s) Both EYES at bedtime  levothyroxine 25 MICROGram(s) Oral daily  metoprolol succinate ER 25 milliGRAM(s) Oral daily  multivitamin 1 Tablet(s) Oral daily  mupirocin 2% Ointment 1 Application(s) Topical two times a day  naloxone Injectable 1 milliGRAM(s) IV Push once  pantoprazole    Tablet 40 milliGRAM(s) Oral before breakfast  prasugrel 10 milliGRAM(s) Oral daily  QUEtiapine 50 milliGRAM(s) Oral at bedtime      LABS:                         11.7   8.83  )-----------( 379      ( 25 Jan 2023 04:39 )             40.9     140  |  95<L>  |  25<H>  ----------------------------<  206<H>          (01-25-23 @ 04:39)  5.1<H>   |  33<H>  |  1.1    Ca    8.8          (01-25-23 @ 04:39)  Phos  2.9         (01-23-23 @ 17:06)  Mg     2.0         (01-25-23 @ 04:39)    TPro  7.0  /  Alb  3.0<L>  /  TBili  1.0  /  DBili  x   /  AST  54<H>  /  ALT  25  /  AlkPhos  134<H>       01-25-23 @ 04:39    Vitamin B12, Serum: 821 pg/mL (01-18 @ 07:56)    Blood Glucose (Past 24 hours):  239 mg/dL (01-25 @ 12:23)  225 mg/dL (01-25 @ 11:57)  205 mg/dL (01-25 @ 07:35)  192 mg/dL (01-24 @ 21:26)  248 mg/dL (01-24 @ 16:29)      DIET:   Diet, Soft and Bite Sized:   Mildly Thick Liquids (MILDTHICKLIQS) (01-25-23 @ 10:15) [Active]      ASSESSMENT  - poorly controlled DM  - episode of hypoglycemia yesterday resulting in transfer to CCU - now improved and more alert  - LLE cellulitis, h/o L ankle surgery with hardware  - recent MRSA bacteremia  - h/o celiac disease and divertivulitis      PLAN:  - cont PO diet, add carb consistent low Na dietary restrictions  - add sugar free Prosource Gelatein or sugar free yogurt every afternoon  - add Luis po twice a day - need to document intake of it as one would a medication  - send zinc level - then start 220 mg ZnSO4 po once daily - adjust dose when level resulted  - add vit C 500 mg po once daily  - if pt can not take po diet, or if intake < 50% of meals, should insert small bore NG feeding tube and feed 360 ml Glucerna 1.2 (if unavailable due to product shortages, use Diabetisource, same volume) x 3 meal-patterned feeds/d with pre-feed glucose testing and insulin dosing as indicated. Then cont to Luis via NG 2/d  - will follow NUTRITION SUPPORT TEAM  -  PROGRESS NOTE     Interval Events:    Awake, confused  Remains on NC O2. Afebrile  Tolerating PO diet. Speech eval this am appreciated  Assisted with meal, intake has been fair    REVIEW OF SYSTEMS:  Negative except as noted above.     VITALS:  T(F): 97.5 (01-25 @ 12:10), Max: 97.5 (01-25 @ 12:10)  HR: 68 (01-25 @ 12:10) (68 - 88)  BP: 106/61 (01-25 @ 12:10) (106/61 - 112/65)  RR: 18 (01-25 @ 12:10) (18 - 18)  SpO2: 100% (01-25 @ 12:10) (100% - 100%)    HEIGHT/WEIGHT/BMI:   Height (cm): 185.4 (01-13), 185.4 (06-22), 185.4 (06-15)  Weight (kg): 66.2 (01-25), 79 (06-22), 77.1 (06-15)  BMI (kg/m2): 19.3 (01-25), 23 (01-13), 23 (06-22), 22.4 (06-15)    I/Os:     01-24-23 @ 07:01  -  01-25-23 @ 07:00  --------------------------------------------------------  IN:    Oral Fluid: 100 mL  Total IN: 100 mL    OUT:    Incontinent per Condom Catheter (mL): 2400 mL  Total OUT: 2400 mL    Total NET: -2300 mL    MEDICATIONS:   aspirin enteric coated 81 milliGRAM(s) Oral daily  atorvastatin 80 milliGRAM(s) Oral at bedtime  bacitracin   Ointment 1 Application(s) Topical two times a day  chlorhexidine 2% Cloths 1 Application(s) Topical <User Schedule>  clobetasol 0.05% Cream 1 Application(s) Topical two times a day  clotrimazole 1% Cream 1 Application(s) Topical two times a day  digoxin     Tablet 125 MICROGram(s) Oral daily  DULoxetine 30 milliGRAM(s) Oral <User Schedule>  enoxaparin Injectable 40 milliGRAM(s) SubCutaneous every 24 hours  ferrous    sulfate 325 milliGRAM(s) Oral daily  furosemide   Injectable 40 milliGRAM(s) IV Push two times a day  glucagon  Injectable 1 milliGRAM(s) IntraMuscular once  insulin lispro (ADMELOG) corrective regimen sliding scale   SubCutaneous three times a day before meals  latanoprost 0.005% Ophthalmic Solution 1 Drop(s) Both EYES at bedtime  levothyroxine 25 MICROGram(s) Oral daily  metoprolol succinate ER 25 milliGRAM(s) Oral daily  multivitamin 1 Tablet(s) Oral daily  mupirocin 2% Ointment 1 Application(s) Topical two times a day  naloxone Injectable 1 milliGRAM(s) IV Push once  pantoprazole    Tablet 40 milliGRAM(s) Oral before breakfast  prasugrel 10 milliGRAM(s) Oral daily  QUEtiapine 50 milliGRAM(s) Oral at bedtime      LABS:                         11.7   8.83  )-----------( 379      ( 25 Jan 2023 04:39 )             40.9     140  |  95<L>  |  25<H>  ----------------------------<  206<H>          (01-25-23 @ 04:39)  5.1<H>   |  33<H>  |  1.1    Ca    8.8          (01-25-23 @ 04:39)  Phos  2.9         (01-23-23 @ 17:06)  Mg     2.0         (01-25-23 @ 04:39)    TPro  7.0  /  Alb  3.0<L>  /  TBili  1.0  /  DBili  x   /  AST  54<H>  /  ALT  25  /  AlkPhos  134<H>       01-25-23 @ 04:39    Vitamin B12, Serum: 821 pg/mL (01-18 @ 07:56)    Blood Glucose (Past 24 hours):  239 mg/dL (01-25 @ 12:23)  225 mg/dL (01-25 @ 11:57)  205 mg/dL (01-25 @ 07:35)  192 mg/dL (01-24 @ 21:26)  248 mg/dL (01-24 @ 16:29)      DIET:   Diet, Soft and Bite Sized:   Mildly Thick Liquids (MILDTHICKLIQS) (01-25-23 @ 10:15) [Active]      ASSESSMENT  - poorly controlled DM  - episode of hypoglycemia yesterday resulting in transfer to CCU - now improved and more alert  - LLE cellulitis, h/o L ankle surgery with hardware  - recent MRSA bacteremia  - h/o celiac disease and divertivulitis      PLAN:  - cont PO diet, add carb consistent low Na dietary restrictions  - add sugar free Prosource Gelatein or sugar free yogurt every afternoon  - add Luis po twice a day - need to document intake of it as one would a medication  - send zinc level - then start 220 mg ZnSO4 po once daily - adjust dose when level resulted  - add vit C 500 mg po once daily  - if pt can not take po diet, or if intake < 50% of meals, should insert small bore NG feeding tube and feed 360 ml Glucerna 1.2 (if unavailable due to product shortages, use Diabetisource, same volume) x 3 meal-patterned feeds/d with pre-feed glucose testing and insulin dosing as indicated. Then cont to Luis via NG 2/d  - will follow NUTRITION SUPPORT TEAM  -  PROGRESS NOTE     Interval Events:    Awake, confused but responsive  spoke with family at bedside  Remains on NC O2. Afebrile, abd NT ND  Tolerating PO diet. Speech eval this am appreciated  Assisted with meal, intake has been fair but reportedly improving - pt sys he ate almost all of the eggs this morning    REVIEW OF SYSTEMS:  Negative except as noted above.     VITALS:  T(F): 97.5 (01-25 @ 12:10), Max: 97.5 (01-25 @ 12:10)  HR: 68 (01-25 @ 12:10) (68 - 88)  BP: 106/61 (01-25 @ 12:10) (106/61 - 112/65)  RR: 18 (01-25 @ 12:10) (18 - 18)  SpO2: 100% (01-25 @ 12:10) (100% - 100%)    HEIGHT/WEIGHT/BMI:   Height (cm): 185.4 (01-13), 185.4 (06-22), 185.4 (06-15)  Weight (kg): 66.2 (01-25), 79 (06-22), 77.1 (06-15)  BMI (kg/m2): 19.3 (01-25), 23 (01-13), 23 (06-22), 22.4 (06-15)    I/Os:     01-24-23 @ 07:01  -  01-25-23 @ 07:00  --------------------------------------------------------  IN:    Oral Fluid: 100 mL  Total IN: 100 mL    OUT:    Incontinent per Condom Catheter (mL): 2400 mL  Total OUT: 2400 mL    Total NET: -2300 mL    MEDICATIONS:   aspirin enteric coated 81 milliGRAM(s) Oral daily  atorvastatin 80 milliGRAM(s) Oral at bedtime  bacitracin   Ointment 1 Application(s) Topical two times a day  chlorhexidine 2% Cloths 1 Application(s) Topical <User Schedule>  clobetasol 0.05% Cream 1 Application(s) Topical two times a day  clotrimazole 1% Cream 1 Application(s) Topical two times a day  digoxin     Tablet 125 MICROGram(s) Oral daily  DULoxetine 30 milliGRAM(s) Oral <User Schedule>  enoxaparin Injectable 40 milliGRAM(s) SubCutaneous every 24 hours  ferrous    sulfate 325 milliGRAM(s) Oral daily  furosemide   Injectable 40 milliGRAM(s) IV Push two times a day  insulin lispro (ADMELOG) corrective regimen sliding scale   SubCutaneous three times a day before meals  latanoprost 0.005% Ophthalmic Solution 1 Drop(s) Both EYES at bedtime  levothyroxine 25 MICROGram(s) Oral daily  metoprolol succinate ER 25 milliGRAM(s) Oral daily  multivitamin 1 Tablet(s) Oral daily  mupirocin 2% Ointment 1 Application(s) Topical two times a day  naloxone Injectable 1 milliGRAM(s) IV Push once  pantoprazole    Tablet 40 milliGRAM(s) Oral before breakfast  prasugrel 10 milliGRAM(s) Oral daily  QUEtiapine 50 milliGRAM(s) Oral at bedtime      LABS:                         11.7   8.83  )-----------( 379      ( 25 Jan 2023 04:39 )             40.9     140  |  95<L>  |  25<H>  ----------------------------<  206<H>          (01-25-23 @ 04:39)  5.1<H>   |  33<H>  |  1.1    Ca    8.8          (01-25-23 @ 04:39)  Phos  2.9         (01-23-23 @ 17:06)  Mg     2.0         (01-25-23 @ 04:39)    TPro  7.0  /  Alb  3.0<L>  /  TBili  1.0  /  DBili  x   /  AST  54<H>  /  ALT  25  /  AlkPhos  134<H>       01-25-23 @ 04:39    Vitamin B12, Serum: 821 pg/mL (01-18 @ 07:56)    Blood Glucose (Past 24 hours):  239 mg/dL (01-25 @ 12:23)  225 mg/dL (01-25 @ 11:57)  205 mg/dL (01-25 @ 07:35)  192 mg/dL (01-24 @ 21:26)  248 mg/dL (01-24 @ 16:29)      DIET:   Diet, Soft and Bite Sized:   Mildly Thick Liquids (MILDTHICKLIQS) (01-25-23 @ 10:15) [Active]      ASSESSMENT  - poorly controlled DM  - episode of hypoglycemia yesterday resulting in transfer to CCU - now improved and more alert  - LLE cellulitis, h/o L ankle surgery with hardware  - recent MRSA bacteremia  - h/o celiac disease and divertivulitis      PLAN:  - cont PO diet, add carb consistent low Na dietary restrictions  - add sugar free Prosource Gelatein or sugar free yogurt every afternoon - assist pt with these and document intake  - add Luis po twice a day x 1 month - need to document intake of it as one would a medication  - send zinc level - then start 220 mg ZnSO4 po once daily - adjust dose when level resulted  - add vit C 500 mg po once daily  - if pt can not take po diet, or if intake < 50% of meals, should insert small bore NG feeding tube and feed 360 ml Glucerna 1.2 (if unavailable due to product shortages, use Diabetisource, same volume) x 3 meal-patterned feeds/d with pre-feed glucose testing and insulin dosing as indicated. Then cont to Luis via NG 2/d  - will follow [id number]: || ||00\01||False;

## 2023-01-25 NOTE — PROGRESS NOTE ADULT - SUBJECTIVE AND OBJECTIVE BOX
SUBJECTIVE:    Patient is a 67y old Male who presents with a chief complaint of recurrent cellulitis (24 Jan 2023 12:11)    Currently admitted to medicine with the primary diagnosis of Cellulitis       Today is hospital day 14d. This morning he is resting comfortably in bed and reports no new issues or overnight events.     PAST MEDICAL & SURGICAL HISTORY  Status post percutaneous transluminal coronary angioplasty  2 stents    Atherosclerosis of coronary artery  CAD (coronary artery disease)    Osteoarthritis    HLD (hyperlipidemia)    Diabetes  on insulin pump    CHF (congestive heart failure)  EF ~ 25%    History of celiac disease    Diverticulitis    STEMI (ST elevation myocardial infarction)    Diabetic neuropathy  Hands and Feet    Anxiety and depression    Other postprocedural status  Fixation hardware in foot LEFT    Stented coronary artery  10/18 heart attack  INFERIOR WALL MI    S/P CABG x 1  2018    S/P TKR (total knee replacement), right  with infection Mrsa   per pt he was cleared from MRSA infection    Surgery, elective  right knee wound debridement    History of open reduction and internal fixation (ORIF) procedure  right hip    H/O shoulder surgery  right    AICD (automatic cardioverter/defibrillator) present  [facility]    Cholecystostomy care  drain inserted 2020 &amp; removed 4 months ago    History of tonsillectomy    History of hip replacement, total, right    Elective surgery  plastic surgery Left shin      SOCIAL HISTORY:  Negative for smoking/alcohol/drug use.     ALLERGIES:  ACE inhibitors (Rash)  Entresto (Swelling)    MEDICATIONS:  STANDING MEDICATIONS  aspirin enteric coated 81 milliGRAM(s) Oral daily  atorvastatin 80 milliGRAM(s) Oral at bedtime  bacitracin   Ointment 1 Application(s) Topical two times a day  chlorhexidine 2% Cloths 1 Application(s) Topical <User Schedule>  clobetasol 0.05% Cream 1 Application(s) Topical two times a day  clotrimazole 1% Cream 1 Application(s) Topical two times a day  digoxin     Tablet 125 MICROGram(s) Oral daily  DULoxetine 30 milliGRAM(s) Oral <User Schedule>  enoxaparin Injectable 40 milliGRAM(s) SubCutaneous every 24 hours  ferrous    sulfate 325 milliGRAM(s) Oral daily  furosemide   Injectable 40 milliGRAM(s) IV Push two times a day  glucagon  Injectable 1 milliGRAM(s) IntraMuscular once  insulin lispro (ADMELOG) corrective regimen sliding scale   SubCutaneous three times a day before meals  latanoprost 0.005% Ophthalmic Solution 1 Drop(s) Both EYES at bedtime  levothyroxine 25 MICROGram(s) Oral daily  metoprolol succinate ER 25 milliGRAM(s) Oral daily  multivitamin 1 Tablet(s) Oral daily  mupirocin 2% Ointment 1 Application(s) Topical two times a day  naloxone Injectable 1 milliGRAM(s) IV Push once  pantoprazole    Tablet 40 milliGRAM(s) Oral before breakfast  prasugrel 10 milliGRAM(s) Oral daily  QUEtiapine 50 milliGRAM(s) Oral at bedtime    PRN MEDICATIONS    VITALS:   T(F): 96.6  HR: 88  BP: 112/65  RR: 18  SpO2: --    LABS:                        11.7   8.83  )-----------( 379      ( 25 Jan 2023 04:39 )             40.9     01-25    140  |  95<L>  |  25<H>  ----------------------------<  206<H>  5.1<H>   |  33<H>  |  1.1    Ca    8.8      25 Jan 2023 04:39  Phos  2.9     01-23  Mg     2.0     01-25    TPro  7.0  /  Alb  3.0<L>  /  TBili  1.0  /  DBili  x   /  AST  54<H>  /  ALT  25  /  AlkPhos  134<H>  01-25      RADIOLOGY:  ACC: [id number] EXAM:  XR CHEST PORTABLE IMMED 1V   ORDERED BY: HIRO CLAIRE     PROCEDURE DATE:  01/24/2023          INTERPRETATION:  Clinical History / Reason for exam: Shortness of breath    Comparison : Chest radiograph 2 days prior.    Technique/Positioning: Frontal portable, low lung volumes.    Findings:    Support devices: Dual-chamber left-sided ICD. Telemetry leads are seen.    Cardiac/mediastinum/hilum: Unremarkable.    Lung parenchyma/Pleura: Improving opacifications.    Skeleton/soft tissues: Unchanged.    Impression:    Improving CHF.      PHYSICAL EXAM:  GEN: No acute distress  LUNGS: Clear to auscultation bilaterally   HEART: S1/S2 present. RRR.   ABD: Soft, non-tender, non-distended. Bowel sounds present  EXT: NC/NC/NE/2+PP/ROSEN/Skin Intact.   NEURO: AAOX3    Intravenous access:   NG tube:   Loja Catheter:          SUBJECTIVE:    Patient is a 67y old Male who presents with a chief complaint of recurrent cellulitis (24 Jan 2023 12:11)    Currently admitted to medicine with the primary diagnosis of Cellulitis       Today is hospital day 14d. This morning he is resting comfortably in bed and reports no new issues or overnight events.     PAST MEDICAL & SURGICAL HISTORY  Status post percutaneous transluminal coronary angioplasty  2 stents    Atherosclerosis of coronary artery  CAD (coronary artery disease)    Osteoarthritis    HLD (hyperlipidemia)    Diabetes  on insulin pump    CHF (congestive heart failure)  EF ~ 25%    History of celiac disease    Diverticulitis    STEMI (ST elevation myocardial infarction)    Diabetic neuropathy  Hands and Feet    Anxiety and depression    Other postprocedural status  Fixation hardware in foot LEFT    Stented coronary artery  10/18 heart attack  INFERIOR WALL MI    S/P CABG x 1  2018    S/P TKR (total knee replacement), right  with infection Mrsa   per pt he was cleared from MRSA infection    Surgery, elective  right knee wound debridement    History of open reduction and internal fixation (ORIF) procedure  right hip    H/O shoulder surgery  right    AICD (automatic cardioverter/defibrillator) present  [facility]    Cholecystostomy care  drain inserted 2020 &amp; removed 4 months ago    History of tonsillectomy    History of hip replacement, total, right    Elective surgery  plastic surgery Left shin      SOCIAL HISTORY:  Negative for smoking/alcohol/drug use.     ALLERGIES:  ACE inhibitors (Rash)  Entresto (Swelling)    MEDICATIONS:  STANDING MEDICATIONS  aspirin enteric coated 81 milliGRAM(s) Oral daily  atorvastatin 80 milliGRAM(s) Oral at bedtime  bacitracin   Ointment 1 Application(s) Topical two times a day  chlorhexidine 2% Cloths 1 Application(s) Topical <User Schedule>  clobetasol 0.05% Cream 1 Application(s) Topical two times a day  clotrimazole 1% Cream 1 Application(s) Topical two times a day  digoxin     Tablet 125 MICROGram(s) Oral daily  DULoxetine 30 milliGRAM(s) Oral <User Schedule>  enoxaparin Injectable 40 milliGRAM(s) SubCutaneous every 24 hours  ferrous    sulfate 325 milliGRAM(s) Oral daily  furosemide   Injectable 40 milliGRAM(s) IV Push two times a day  glucagon  Injectable 1 milliGRAM(s) IntraMuscular once  insulin lispro (ADMELOG) corrective regimen sliding scale   SubCutaneous three times a day before meals  latanoprost 0.005% Ophthalmic Solution 1 Drop(s) Both EYES at bedtime  levothyroxine 25 MICROGram(s) Oral daily  metoprolol succinate ER 25 milliGRAM(s) Oral daily  multivitamin 1 Tablet(s) Oral daily  mupirocin 2% Ointment 1 Application(s) Topical two times a day  naloxone Injectable 1 milliGRAM(s) IV Push once  pantoprazole    Tablet 40 milliGRAM(s) Oral before breakfast  prasugrel 10 milliGRAM(s) Oral daily  QUEtiapine 50 milliGRAM(s) Oral at bedtime    PRN MEDICATIONS    VITALS:   T(F): 96.6  HR: 88  BP: 112/65  RR: 18  SpO2: --    LABS:                        11.7   8.83  )-----------( 379      ( 25 Jan 2023 04:39 )             40.9     01-25    140  |  95<L>  |  25<H>  ----------------------------<  206<H>  5.1<H>   |  33<H>  |  1.1    Ca    8.8      25 Jan 2023 04:39  Phos  2.9     01-23  Mg     2.0     01-25    TPro  7.0  /  Alb  3.0<L>  /  TBili  1.0  /  DBili  x   /  AST  54<H>  /  ALT  25  /  AlkPhos  134<H>  01-25      RADIOLOGY:  ACC: [id number] EXAM:  XR CHEST PORTABLE IMMED 1V   ORDERED BY: HIRO CLAIRE     PROCEDURE DATE:  01/24/2023          INTERPRETATION:  Clinical History / Reason for exam: Shortness of breath    Comparison : Chest radiograph 2 days prior.    Technique/Positioning: Frontal portable, low lung volumes.    Findings:    Support devices: Dual-chamber left-sided ICD. Telemetry leads are seen.    Cardiac/mediastinum/hilum: Unremarkable.    Lung parenchyma/Pleura: Improving opacifications.    Skeleton/soft tissues: Unchanged.    Impression:    Improving CHF.      PHYSICAL EXAM:  GENERAL: NAD, well-developed.  HEAD:  Atraumatic, Normocephalic.  EYES: EOMI, PERRLA, conjunctiva and sclera clear.  NECK: Supple, No JVD.  CHEST/LUNG: bilateral rales.  HEART: Regular rate and rhythm; S1 S2.   ABDOMEN: Soft, Nontender, Nondistended; Bowel sounds present.  EXTREMITIES:  multiple wound on left leg with dressing, right AKA.   PSYCH: AAOx3.  NEUROLOGY: non-focal.  SKIN: No rashes or lesions.

## 2023-01-25 NOTE — PROGRESS NOTE ADULT - SUBJECTIVE AND OBJECTIVE BOX
HPI  Patient is a 67y old Male who presents with a chief complaint of recurrent cellulitis (25 Jan 2023 09:28)    Currently admitted to medicine with the primary diagnosis of Cellulitis       Today is hospital day 14d.     INTERVAL HPI / OVERNIGHT EVENTS:  Patient was seen and examined at bedside  Patient Feels okay  No new complaints  shortness of breath is improved; do not use oxygen at home  Denies any abdominal pain/nausea/vomiting        PAST MEDICAL & SURGICAL HISTORY  Status post percutaneous transluminal coronary angioplasty  2 stents    Atherosclerosis of coronary artery  CAD (coronary artery disease)    Osteoarthritis    HLD (hyperlipidemia)    Diabetes  on insulin pump    CHF (congestive heart failure)  EF ~ 25%    History of celiac disease    Diverticulitis    STEMI (ST elevation myocardial infarction)    Diabetic neuropathy  Hands and Feet    Anxiety and depression    Other postprocedural status  Fixation hardware in foot LEFT    Stented coronary artery  10/18 heart attack  INFERIOR WALL MI    S/P CABG x 1  2018    S/P TKR (total knee replacement), right  with infection Mrsa   per pt he was cleared from MRSA infection    Surgery, elective  right knee wound debridement    History of open reduction and internal fixation (ORIF) procedure  right hip    H/O shoulder surgery  right    AICD (automatic cardioverter/defibrillator) present  [facility]    Cholecystostomy care  drain inserted 2020 &amp; removed 4 months ago    History of tonsillectomy    History of hip replacement, total, right    Elective surgery  plastic surgery Left shin      ALLERGIES  ACE inhibitors (Rash)  Entresto (Swelling)    MEDICATIONS  STANDING MEDICATIONS  aspirin enteric coated 81 milliGRAM(s) Oral daily  atorvastatin 80 milliGRAM(s) Oral at bedtime  bacitracin   Ointment 1 Application(s) Topical two times a day  chlorhexidine 2% Cloths 1 Application(s) Topical <User Schedule>  clobetasol 0.05% Cream 1 Application(s) Topical two times a day  clotrimazole 1% Cream 1 Application(s) Topical two times a day  digoxin     Tablet 125 MICROGram(s) Oral daily  DULoxetine 30 milliGRAM(s) Oral <User Schedule>  enoxaparin Injectable 40 milliGRAM(s) SubCutaneous every 24 hours  ferrous    sulfate 325 milliGRAM(s) Oral daily  furosemide   Injectable 40 milliGRAM(s) IV Push two times a day  glucagon  Injectable 1 milliGRAM(s) IntraMuscular once  insulin lispro (ADMELOG) corrective regimen sliding scale   SubCutaneous three times a day before meals  latanoprost 0.005% Ophthalmic Solution 1 Drop(s) Both EYES at bedtime  levothyroxine 25 MICROGram(s) Oral daily  metoprolol succinate ER 25 milliGRAM(s) Oral daily  multivitamin 1 Tablet(s) Oral daily  mupirocin 2% Ointment 1 Application(s) Topical two times a day  naloxone Injectable 1 milliGRAM(s) IV Push once  pantoprazole    Tablet 40 milliGRAM(s) Oral before breakfast  prasugrel 10 milliGRAM(s) Oral daily  QUEtiapine 50 milliGRAM(s) Oral at bedtime    PRN MEDICATIONS    VITALS:  T(F): 97.5  HR: 68  BP: 106/61  RR: 18  SpO2: 100%    PHYSICAL EXAM  GEN: no distress, comfortable  PULM: BS heard b/l equal, No wheezing  CVS: S1S2 present, no rubs or gallops  ABD: Soft, non-distended, no guarding; non-tender  EXT: No lower extremity edema, right AKA  skin: left lower extremity padded; bilateral wrist area- covered with kerlex  NEURO: A&Ox3, moving all extremities    LABS                        11.7   8.83  )-----------( 379      ( 25 Jan 2023 04:39 )             40.9     01-25    140  |  95<L>  |  25<H>  ----------------------------<  206<H>  5.1<H>   |  33<H>  |  1.1    Ca    8.8      25 Jan 2023 04:39  Phos  2.9     01-23  Mg     2.0     01-25    TPro  7.0  /  Alb  3.0<L>  /  TBili  1.0  /  DBili  x   /  AST  54<H>  /  ALT  25  /  AlkPhos  134<H>  01-25                  RADIOLOGY

## 2023-01-25 NOTE — PROGRESS NOTE ADULT - ASSESSMENT
66 yo M w HTN, HLD, CAD/MI s/p CABG/VERONCIA, severe HF/ICM s/p AICD, Uncontrolled DM w peripheral neuropathy, R AKA, MRSA bacteremia, ?Psoriasis (on topical steroids), L ankle surgery with fixation hardware presents for recurrent cellulitis.     A/P:     # Acute Hypoxic Respiratory failure due to Acute HFrEF:    s/p AICD.  CXR showed bilateral pleural effusion, bilateral infiltrates.   Echo showed severe reduced LVEF <15%,   Continue Lasix 40mg IV BID. repeat CXR reviewed- showing improvement- Can switch to PO tomorrow   Continue Metoprolol 25mg daily.   Digoxin level 2.5 1/13, repeated 1/23: 1.4, reduced Digoxin to 0.125mg daily.   Fluid restriction 1.2L/day. Low sodium diet, daily weight.   - wean off oxygen as toelrated- d/w nursing staff and     # Multiple Left lower extremity wound: Multiple small ulcer, erosion, Bulla.   Bilateral Hand erosions and excoriations.   No evidence of cellulitis.   Arterial duplex 1/11 was negative.   Patient was evaluated by podiatry, ID, dermatology and Burn.   s/p brief antibiotics, discontinued.   Blood cx no growth.   Continue current wound care.     #  metabolic encephalopathy- resolved  From Hypoglycemia vs hospital acquired delirium   Head CT was negative. EEG unremarkable     # CAD s/p CABG, PCI and VERONICA  No chest pain, Troponin 0.05 stable.   Cardiac cath Dec 2018.   Continue ASA, Effient, Metoprolol and Lipitor 80mg daily.     # Iron deficiency anemia:   Hb stable. Continue Ferrous sulfate.     # DM type 2: uncontrolled, ( last A1c 12) formerly on insulin pump  # uncontrolled hyperglycemia  HbA1c: 9.8%  FS is controlled, Hypoglycemia resolved.   previously on lantus- held because of hypoglycemia- restart lantus 10 U at bed time  cover with sliding scale    Hypothyroidism  Continue Synthroid, TSH 5.61, FT4 1.2    Depression/Anxiety: continue duloxetine 30mg TID.   Restless leg syndrome:  on valium 2mg bid prn at home- held here     Celiac Disease:   Nutrition f/u and vitamin replacement prn    DVT PPX: LVX 40 QD  GI Prophylaxis: PPI QD     speech and swallow team evaluated: sofat and bite sized with mild thick liquids; postion upright; 1:1 feed    #Progress Note Handoff  Pending (specify): wean off oxygen  Family discussion: plan of care d/w patient  Disposition: likely home with home care.   prepare for discharge

## 2023-01-25 NOTE — PROGRESS NOTE ADULT - SUBJECTIVE AND OBJECTIVE BOX
SUBJ: Patient seen and examined. No events overnight.       MEDICATIONS  (STANDING):  aspirin enteric coated 81 milliGRAM(s) Oral daily  atorvastatin 80 milliGRAM(s) Oral at bedtime  bacitracin   Ointment 1 Application(s) Topical two times a day  chlorhexidine 2% Cloths 1 Application(s) Topical <User Schedule>  clobetasol 0.05% Cream 1 Application(s) Topical two times a day  clotrimazole 1% Cream 1 Application(s) Topical two times a day  dextrose 5%. 1000 milliLiter(s) (50 mL/Hr) IV Continuous <Continuous>  dextrose 5%. 1000 milliLiter(s) (100 mL/Hr) IV Continuous <Continuous>  dextrose 50% Injectable 25 Gram(s) IV Push once  dextrose 50% Injectable 12.5 Gram(s) IV Push once  dextrose 50% Injectable 25 Gram(s) IV Push once  digoxin     Tablet 125 MICROGram(s) Oral daily  DULoxetine 30 milliGRAM(s) Oral <User Schedule>  enoxaparin Injectable 40 milliGRAM(s) SubCutaneous every 24 hours  ferrous    sulfate 325 milliGRAM(s) Oral daily  furosemide   Injectable 40 milliGRAM(s) IV Push two times a day  glucagon  Injectable 1 milliGRAM(s) IntraMuscular once  insulin glargine Injectable (LANTUS) 10 Unit(s) SubCutaneous at bedtime  insulin lispro (ADMELOG) corrective regimen sliding scale   SubCutaneous three times a day before meals  latanoprost 0.005% Ophthalmic Solution 1 Drop(s) Both EYES at bedtime  levothyroxine 25 MICROGram(s) Oral daily  metoprolol succinate ER 25 milliGRAM(s) Oral daily  multivitamin 1 Tablet(s) Oral daily  mupirocin 2% Ointment 1 Application(s) Topical two times a day  naloxone Injectable 1 milliGRAM(s) IV Push once  pantoprazole    Tablet 40 milliGRAM(s) Oral before breakfast  prasugrel 10 milliGRAM(s) Oral daily  QUEtiapine 50 milliGRAM(s) Oral at bedtime    MEDICATIONS  (PRN):  dextrose Oral Gel 15 Gram(s) Oral once PRN Blood Glucose LESS THAN 70 milliGRAM(s)/deciliter            Vital Signs Last 24 Hrs  T(C): 35.9 (25 Jan 2023 19:59), Max: 36.4 (25 Jan 2023 12:10)  T(F): 96.7 (25 Jan 2023 19:59), Max: 97.5 (25 Jan 2023 12:10)  HR: 77 (25 Jan 2023 19:59) (68 - 88)  BP: 98/54 (25 Jan 2023 19:59) (98/54 - 112/65)  BP(mean): --  RR: 18 (25 Jan 2023 19:59) (16 - 18)  SpO2: 95% (25 Jan 2023 14:39) (95% - 100%)    Parameters below as of 25 Jan 2023 14:39  Patient On (Oxygen Delivery Method): room air         REVIEW OF SYSTEMS:  CONSTITUTIONAL: No fever  NECK: No pain or stiffness  CARDIOVASCULAR: patient denies chest pain, shortness of breath or palpitations .  Repiratory: No cough or wheezing.  NEUROLOGICAL: No focal deficits to report.  GI: no BRBPR, no N,V,diarrhea.    PSYCHIATRY: normal mood and affect  HEENT: no nasal discharge, no ecchymosis      PHYSICAL EXAM:  GENERAL: NAD  HEAD:  Atraumatic, Normocephalic  NECK: Supple, No JVD  NERVOUS SYSTEM:  Alert & Oriented X3, Good concentration  CHEST/LUNG: Clear to anterior auscultation bilaterally  HEART: Regular rate and rhythm  ABDOMEN: Soft, Nontender, Nondistended;   EXTREMITIES:  No  edema      	  TELEMETRY:      LABS:                        11.7   8.83  )-----------( 379      ( 25 Jan 2023 04:39 )             40.9     01-25    140  |  95<L>  |  25<H>  ----------------------------<  206<H>  5.1<H>   |  33<H>  |  1.1    Ca    8.8      25 Jan 2023 04:39  Mg     2.0     01-25    TPro  7.0  /  Alb  3.0<L>  /  TBili  1.0  /  DBili  x   /  AST  54<H>  /  ALT  25  /  AlkPhos  134<H>  01-25            I&O's Summary    24 Jan 2023 07:01  -  25 Jan 2023 07:00  --------------------------------------------------------  IN: 100 mL / OUT: 2400 mL / NET: -2300 mL    25 Jan 2023 07:01  -  25 Jan 2023 21:02  --------------------------------------------------------  IN: 360 mL / OUT: 550 mL / NET: -190 mL      BNP  RADIOLOGY & ADDITIONAL STUDIES:    IMPRESSION AND PLAN:    Ischemic cardiomyopathy   HFrEF  Clinically euvolemic  - Management as per primary team  - May switch to oral diuresis   - Will follow asneeded

## 2023-01-26 NOTE — SWALLOW BEDSIDE ASSESSMENT ADULT - COMMENTS
Pt and his wife report Pt with vocal hoarseness today. Pt's PCA stated that Pt had some coughing earlier with thin liquids.

## 2023-01-26 NOTE — CHART NOTE - NSCHARTNOTEFT_GEN_A_CORE
3 Day Calorie Count initiated from 1/26 - 1/28; PCA to give Calorie Count to RN, will hang. RD to f/u on results of calorie count on 1/29    RD to remain available: Tiffanie Arias, XOCHILT [phone] or via Teams

## 2023-01-26 NOTE — PROGRESS NOTE ADULT - ASSESSMENT
66 yo M w HTN, HLD, CAD/MI s/p CABG/VERONICA, severe HF/ICM s/p AICD, Uncontrolled DM w peripheral neuropathy, R AKA, MRSA bacteremia, ?Psoriasis (on topical steroids), L ankle surgery with fixation hardware presents for recurrent cellulitis. Course complicated by AMS due to hypoglycemia. Pt was upgraded to ICU for q1H glucose monitoring.    Acute Hypoxic Respiratory failure due to Acute HFrEF:   -s/p AICD.  -CXR showed bilateral pleural effusion, bilateral infiltrates.   -Echo showed severe reduced LVEF <15%, prior ef was 20%  -Continue Lasix 40mg IV BID. repeat CXR reviewed- showing improvement- Can switch to PO tomorrow   -Continue Metoprolol 25mg daily.   -Digoxin level 2.5 1/13, repeated 1/23: 1.4, reduced Digoxin to 0.125mg daily.   -Fluid restriction 1.2L/day. Low sodium diet, daily weight.   - wean off oxygen     Multiple Left lower extremity wound  Multiple small ulcer, erosion, Bulla.   Bilateral Hand erosions and excoriations.   No evidence of cellulitis.   -Arterial duplex 1/11 was negative.   -Patient was evaluated by podiatry, ID, vascular cardiology dermatology and Burn.   -s/p brief antibiotics, discontinued.   -Blood cx no growth.   -Continue current wound care.     Metabolic encephalopathy- resolved  -From Hypoglycemia   -Head CT was negative. EEG unremarkable     CAD s/p CABG, PCI and VERONICA  -No chest pain, Troponin 0.05 stable.   -Cardiac cath Dec 2018.   -Continue ASA, Effient, Metoprolol and Lipitor 80mg daily.     Iron deficiency anemia:   -Hb stable. Continue Ferrous sulfate.     DM type 2: uncontrolled, ( last A1c 12) formerly on insulin pump  uncontrolled hyperglycemia  HbA1c: 9.8%  -FS is controlled, Hypoglycemia resolved.   -previously on lantus- held because of hypoglycemia- restart lantus 10 U at bed time  -cover with sliding scale  -no further episodes of hypoglycemia    Hypothyroidism  -Continue Synthroid, TSH 5.61, FT4 1.2    Depression/Anxiety: continue duloxetine 30mg TID.   Restless leg syndrome:  on valium 2mg bid prn at home- held here     Celiac Disease:   Nutrition f/u and vitamin replacement prn    DVT PPX: LVX 40 QD  GI Prophylaxis: PPI QD     speech and swallow team evaluated: sofat and bite sized with mild thick liquids; postion upright; 1:1 feed    #Progress Note Handoff  Pending (specify): wean off oxygen  Family discussion: plan of care d/w patient  Disposition: pending CM evluation       68 yo M w HTN, HLD, CAD/MI s/p CABG/VERONICA, severe HF/ICM s/p AICD, Uncontrolled DM w peripheral neuropathy, R AKA, MRSA bacteremia, ?Psoriasis (on topical steroids), L ankle surgery with fixation hardware presents for recurrent cellulitis. Course complicated by AMS due to hypoglycemia. Pt was upgraded to ICU for q1H glucose monitoring.    Acute Hypoxic Respiratory failure due to Acute HFrEF:   -s/p AICD.  -CXR showed bilateral pleural effusion, bilateral infiltrates.   -Echo showed severe reduced LVEF <15%, prior ef was 20%  -Continue Lasix 40mg IV BID. repeat CXR reviewed- showing improvement- Can switch to PO tomorrow   -Continue Metoprolol 25mg daily.   -Digoxin level 2.5 1/13, repeated 1/23: 1.4, reduced Digoxin to 0.125mg daily.   -Fluid restriction 1.2L/day. Low sodium diet, daily weight.   - wean off oxygen     Multiple Left lower extremity wound  Multiple small ulcer, erosion, Bulla.   Bilateral Hand erosions and excoriations.   No evidence of cellulitis.   -Arterial duplex 1/11 was negative.   -Patient was evaluated by podiatry, ID, vascular cardiology dermatology and Burn.   -s/p brief antibiotics, discontinued.   -Blood cx no growth.   -Continue current wound care.     Metabolic encephalopathy- resolved  -From Hypoglycemia   -Head CT was negative. EEG unremarkable     CAD s/p CABG, PCI and VERONICA  -No chest pain, Troponin 0.05 stable.   -Cardiac cath Dec 2018.   -Continue ASA, Effient, Metoprolol and Lipitor 80mg daily.     Iron deficiency anemia:   -Hb stable. Continue Ferrous sulfate.     DM type 2: uncontrolled, ( last A1c 12) formerly on insulin pump  uncontrolled hyperglycemia  HbA1c: 9.8%  -FS is controlled, Hypoglycemia resolved.   -previously on lantus- held because of hypoglycemia- restart lantus 10 U at bed time  -cover with sliding scale  -no further episodes of hypoglycemia    Hypothyroidism  -Continue Synthroid, TSH 5.61, FT4 1.2    Depression/Anxiety: continue duloxetine 30mg TID.   Restless leg syndrome:  on valium 2mg bid prn at home- held here     Celiac Disease:   Nutrition f/u and vitamin replacement prn    DVT PPX: LVX 40 QD  GI Prophylaxis: PPI QD     speech and swallow team evaluated: soft and bite sized with mild thick liquids; postion upright; 1:1 feed    #Progress Note Handoff  Pending (specify): wean off oxygen  Family discussion: plan of care d/w patient  Disposition: dc home in AM

## 2023-01-26 NOTE — SWALLOW BEDSIDE ASSESSMENT ADULT - SWALLOW EVAL: DIAGNOSIS
+ tolerated easy to chew and thin liquids (8oz) via straw and small cup sip with no overt s/s of aspiration/ penetration.
+toleration for mildly thick liquids, puree, and soft and bite sized consistencies w/o overt s/s aspiration/penetration; suspected pharyngeal dysphagia for thin liquids.
Mild oral dysphagia with regular solids. + tolerance for regular solids and thin liquids without overt s/s of penetration/ aspiration.

## 2023-01-26 NOTE — PROGRESS NOTE ADULT - SUBJECTIVE AND OBJECTIVE BOX
CHIEF COMPLAINT:    Patient is a 67y old  Male who presents with a chief complaint of recurrent cellulitis       INTERVAL HPI/OVERNIGHT EVENTS:    Patient seen and examined at bedside. No acute overnight events occurred.    ROS: Denies pain. All other systems are negative.    Medications:  Standing  aspirin enteric coated 81 milliGRAM(s) Oral daily  atorvastatin 80 milliGRAM(s) Oral at bedtime  bacitracin   Ointment 1 Application(s) Topical two times a day  chlorhexidine 2% Cloths 1 Application(s) Topical <User Schedule>  clobetasol 0.05% Cream 1 Application(s) Topical two times a day  clotrimazole 1% Cream 1 Application(s) Topical two times a day  dextrose 5%. 1000 milliLiter(s) IV Continuous <Continuous>  dextrose 5%. 1000 milliLiter(s) IV Continuous <Continuous>  dextrose 50% Injectable 25 Gram(s) IV Push once  dextrose 50% Injectable 12.5 Gram(s) IV Push once  dextrose 50% Injectable 25 Gram(s) IV Push once  digoxin     Tablet 125 MICROGram(s) Oral daily  DULoxetine 30 milliGRAM(s) Oral <User Schedule>  enoxaparin Injectable 40 milliGRAM(s) SubCutaneous every 24 hours  ferrous    sulfate 325 milliGRAM(s) Oral daily  furosemide   Injectable 40 milliGRAM(s) IV Push two times a day  glucagon  Injectable 1 milliGRAM(s) IntraMuscular once  insulin glargine Injectable (LANTUS) 10 Unit(s) SubCutaneous at bedtime  insulin lispro (ADMELOG) corrective regimen sliding scale   SubCutaneous three times a day before meals  latanoprost 0.005% Ophthalmic Solution 1 Drop(s) Both EYES at bedtime  levothyroxine 25 MICROGram(s) Oral daily  metoprolol succinate ER 25 milliGRAM(s) Oral daily  multivitamin 1 Tablet(s) Oral daily  mupirocin 2% Ointment 1 Application(s) Topical two times a day  naloxone Injectable 1 milliGRAM(s) IV Push once  pantoprazole    Tablet 40 milliGRAM(s) Oral before breakfast  prasugrel 10 milliGRAM(s) Oral daily  QUEtiapine 50 milliGRAM(s) Oral at bedtime    PRN Meds  dextrose Oral Gel 15 Gram(s) Oral once PRN        Vital Signs:    T(F): 96.5 (23 @ 11:51), Max: 97.1 (23 @ 14:39)  HR: 81 (23 @ 11:51) (74 - 81)  BP: 106/55 (23 @ 11:51) (98/54 - 107/60)  RR: 18 (23 @ 11:51) (16 - 18)  SpO2: 95% (23 @ 14:39) (95% - 95%)  I&O's Summary    2023 07:01  -  2023 07:00  --------------------------------------------------------  IN: 360 mL / OUT: 1550 mL / NET: -1190 mL    2023 07:01  -  2023 13:44  --------------------------------------------------------  IN: 286 mL / OUT: 0 mL / NET: 286 mL      Daily     Daily Weight in k.5 (2023 05:15)  CAPILLARY BLOOD GLUCOSE      POCT Blood Glucose.: 295 mg/dL (2023 11:39)  POCT Blood Glucose.: 146 mg/dL (2023 07:47)  POCT Blood Glucose.: 208 mg/dL (2023 16:20)      PHYSICAL EXAM:  GENERAL:  NAD  SKIN: No rashes or lesions  HEENT: Atraumatic. Normocephalic. Anicteric  NECK:  No JVD.   PULMONARY: Clear to ausculation bilaterally. No wheezing. No rales  CVS: Normal S1, S2. Regular rate and rhythm. No murmurs.  ABDOMEN/GI: Soft, Nontender, Nondistended; Bowel sounds are present  EXTREMITIES:  No edema B/L LE. right AKA, LLE dressing clean, dry, intact  NEUROLOGIC:  No motor deficit.  PSYCH: Alert & oriented x 3, normal affect      LABS:                        11.7   8.93  )-----------( 407      ( 2023 04:54 )             40.3     01-26    140  |  95<L>  |  27<H>  ----------------------------<  131<H>  4.3   |  38<H>  |  1.0    Ca    8.9      2023 04:54  Mg     1.9         TPro  7.2  /  Alb  3.1<L>  /  TBili  1.4<H>  /  DBili  x   /  AST  27  /  ALT  22  /  AlkPhos  133<H>        Serum Pro-Brain Natriuretic Peptide: 3697 pg/mL (23 @ 04:54)  Serum Pro-Brain Natriuretic Peptide: 6465 pg/mL (23 @ 05:51)  Serum Pro-Brain Natriuretic Peptide: 46406 pg/mL (23 @ 11:00)          RADIOLOGY & ADDITIONAL TESTS:  Imaging or report Personally Reviewed:  [ ] YES  [ ] NO -->no new images    Telemetry reviewed independently - NSR, no acute events  EKG reviewed independently -->no new EKGs    Consultant(s) Notes Reviewed:  [ ] YES  [ ] NO  Care Discussed with Consultants/Other Providers [ ] YES  [ ] NO    Case discussed with resident  Care discussed with pt

## 2023-01-26 NOTE — SWALLOW BEDSIDE ASSESSMENT ADULT - NS SPL SWALLOW CLINIC TRIAL FT
Mild oral dysphagia with regular solids. + tolerance for regular solids and thin liquids without overt s/s of penetration/ aspiration.
tolerated

## 2023-01-26 NOTE — SWALLOW BEDSIDE ASSESSMENT ADULT - SLP PERTINENT HISTORY OF CURRENT PROBLEM
66 yo Male with PMH: HTN, HLD, CAD/MI s/p CABG/VERONICA, severe HF/ICM s/p AICD, Uncontrolled DM w peripheral neuropathy, R AKA, MRSA bacteremia, ?Psoriasis (on topical steroids), L ankle surgery with fixation hardware.Presented 1/10 for worsening erythematous rash with draining lesions, swelling and tenderness over bilateral hand, and wrist and LLE x1week. 1/13: Rapid response called on patient for unresponsiveness. Unclear etiology of his refractory hypoglycemia.
Pt is a 68 y/o M w/ PMHx: HTN, HLD, CAD/MI s/p CABG/VERONICA, severe HF/ICM s/p AICD, uncontrolled DM w/ peripheral neuropathy, R AKA, MRSA bacteremia, ?psoriasis (on topical steroids), L ankle surgery with fixation hardware presents for recurrent cellulitis. Pt is being treated for AHRF w/ pulmonary edema, acute HFrEF, episode of hypoxia 1/21-RRT called. Course further c/b toxic metabolic encephalopathy from hypoglycemia vs hospital acquired delirium vs digoxin toxicity. CTH (-), EEG unremarkable. CXR-> Diffuse bilateral opacities and effusions, slightly increasing. Pt known to SLP dept from this admission, recs for regular, thin liquids.
66 y/o M w/ PMHx: HTN, HLD, CAD/MI s/p CABG/VERONICA, severe HF/ICM s/p AICD, uncontrolled DM w/ peripheral neuropathy, R AKA, MRSA bacteremia, ?psoriasis (on topical steroids), L ankle surgery with fixation hardware presents for recurrent cellulitis. Pt is being treated for AHRF w/ pulmonary edema, acute HFrEF, episode of hypoxia 1/21-RRT called. Course further c/b toxic metabolic encephalopathy from hypoglycemia vs hospital acquired delirium vs digoxin toxicity. CTH (-), EEG unremarkable. CXR-> Diffuse bilateral opacities and effusions, slightly increasing. Pt known to SLP dept from this admission, recs for regular, thin liquids.

## 2023-01-26 NOTE — SWALLOW BEDSIDE ASSESSMENT ADULT - SLP GENERAL OBSERVATIONS
pt received in bed awake +confused +generalized weakness w/o c/o pain. +1L NC +sit at bedside +min verbal output
Pt. received awake, confused, ox1 (self), room air, +generalized weakness
Pt awake in bed, o2 via RA, hoarse vocal quality.

## 2023-01-26 NOTE — SWALLOW BEDSIDE ASSESSMENT ADULT - SWALLOW EVAL: RECOMMENDED FEEDING/EATING TECHNIQUES
maintain upright posture during/after eating for 30 mins/oral hygiene/small sips/bites
position upright (90 degrees)/small sips/bites
small sips/bites

## 2023-01-26 NOTE — SWALLOW BEDSIDE ASSESSMENT ADULT - SWALLOW EVAL: RECOMMENDED DIET
easy to chew and thin liquids
Regular diet w/thin liquids.
soft and bite sized, mildly thick liquids

## 2023-01-26 NOTE — SWALLOW BEDSIDE ASSESSMENT ADULT - NS ASR SWALLOW FINDINGS DISCUS
NICANOR Monson/Physician/Nursing/Patient/Family
Physician/Nursing/Patient
RN and MD made aware./Physician/Nursing

## 2023-01-27 NOTE — PROGRESS NOTE ADULT - SUBJECTIVE AND OBJECTIVE BOX
FLOWER MARISCAL 67y Male  MRN#: [id number]  Hospital Day: 16d    Pt is currently admitted with the primary diagnosis of recurrent cellulitis    68 yo M w HTN, HLD, CAD/MI s/p CABG/VERONICA, severe HF/ICM s/p AICD, Uncontrolled DM w peripheral neuropathy, R AKA, MRSA bacteremia, Psoriasis (on topical steroids), L ankle surgery with fixation hardware presents for worsening rash and tenderness over bilateral hand and left LE associated with redness and drainage over past week. Patient says the leg has been gradually worsening over the last few days. He is also experiencing b/l wrist swelling and pain.    SUBJECTIVE  Overnight events: None  Subjective complaints: None    Present Today:   - Loja:  No [ x ], Yes [   ] : Indication:     - Type of IV Access:       .. CVC/Piccline:  No [ x ], Yes [   ] : Indication:       .. Midline: No [ x ], Yes [   ] : Indication:                                             ----------------------------------------------------------  OBJECTIVE    VITAL SIGNS: Last 24 Hours  T(C): 36 (27 Jan 2023 05:00), Max: 36 (27 Jan 2023 05:00)  T(F): 96.8 (27 Jan 2023 05:00), Max: 96.8 (27 Jan 2023 05:00)  HR: 79 (27 Jan 2023 05:00) (78 - 81)  BP: 108/58 (27 Jan 2023 05:00) (104/62 - 108/58)  BP(mean): --  RR: 18 (27 Jan 2023 05:00) (18 - 18)  SpO2: 96% (27 Jan 2023 05:00) (96% - 97%)      01-26-23 @ 07:01  -  01-27-23 @ 07:00  --------------------------------------------------------  IN: 781 mL / OUT: 1250 mL / NET: -469 mL    01-27-23 @ 07:01  -  01-27-23 @ 10:07  --------------------------------------------------------  IN: 180 mL / OUT: 0 mL / NET: 180 mL        PHYSICAL EXAM:  General: well-appearing in NAD.  HEENT: Normocephalic, nontraumatic. PERRL.  LUNGS: Clear to auscultation b/l. No wheezes, rales, or rhonchi.  HEART: RRR. No murmurs, rubs, or gallops.  ABDOMEN: Soft, nontender, nondistended. + bowel sounds.  EXT: Pulses palpable x 4. No lower extremity edema.  NEURO: AAOx4.  SKIN: Warm, dry.    PAST MEDICAL & SURGICAL HISTORY  Status post percutaneous transluminal coronary angioplasty 2 stents  Atherosclerosis of coronary artery CAD (coronary artery disease)  Osteoarthritis  HLD (hyperlipidemia)  Diabetes on insulin pump  CHF (congestive heart failure) EF ~ 25%  History of celiac disease  Diverticulitis  STEMI (ST elevation myocardial infarction)  Diabetic neuropathy Hands and Feet  Anxiety and depression  Other postprocedural status Fixation hardware in foot LEFT  Stented coronary artery 10/18 heart attack  INFERIOR WALL MI  S/P CABG x 1 2018  S/P TKR (total knee replacement), right with infection Mrsa per pt he was cleared from MRSA infection  Surgery, elective right knee wound debridement  History of open reduction and internal fixation (ORIF) procedure right hip  H/O shoulder surgery right  AICD (automatic cardioverter/defibrillator) present [facility]  Cholecystostomy care drain inserted 2020 &amp; removed 4 months ago  History of tonsillectomy  History of hip replacement, total, right  Elective surgery plastic surgery Left shin                                            -----------------------------------------------------------  ALLERGIES:  ACE inhibitors (Rash)  Entresto (Swelling)                                            ------------------------------------------------------------    HOME MEDICATIONS  Home Medications:  aspirin 81 mg oral delayed release tablet: 1 tab(s) orally once a day (22 Jun 2022 11:04)  digoxin 125 mcg (0.125 mg) oral tablet: 1 tab(s) orally once a day (26 Jan 2023 14:04)  DULoxetine 30 mg oral delayed release capsule: 1 cap(s) orally 3 times a day (11 Jan 2023 05:49)  insulin glargine 100 units/mL subcutaneous solution: 10 unit(s) subcutaneous once a day (at bedtime) (26 Jan 2023 14:04)  insulin lispro 100 units/mL injectable solution: if your finger stick is 150-199 please inject 2 units  if your finger stick is 200-250 please inject 4 units  if your finger stick is 251-300 please inject 6 units  if your finger stick is 301-350 please inject 8 units  if your finger stick is more than 350 please call your physician    (26 Jan 2023 14:04)  levothyroxine 25 mcg (0.025 mg) oral tablet: 1 tab(s) orally once a day (22 Jun 2022 11:04)  metoprolol succinate 25 mg oral tablet, extended release: 1 tab(s) orally once a day (22 Jun 2022 11:04)  Multiple Vitamins oral tablet: 1 tab(s) orally once a day (22 Jun 2022 11:04)  pantoprazole 40 mg oral delayed release tablet: 1 tab(s) orally once a day (before a meal) (22 Jun 2022 11:04)  prasugrel 10 mg oral tablet: 1 tab(s) orally once a day (22 Jun 2022 11:04)  rosuvastatin 40 mg oral tablet: 1 tab(s) orally once a day (22 Jun 2022 11:04)                           MEDICATIONS:  STANDING MEDICATIONS  aspirin enteric coated 81 milliGRAM(s) Oral daily  atorvastatin 80 milliGRAM(s) Oral at bedtime  bacitracin   Ointment 1 Application(s) Topical two times a day  chlorhexidine 2% Cloths 1 Application(s) Topical <User Schedule>  clobetasol 0.05% Cream 1 Application(s) Topical two times a day  clotrimazole 1% Cream 1 Application(s) Topical two times a day  dextrose 5%. 1000 milliLiter(s) IV Continuous <Continuous>  dextrose 5%. 1000 milliLiter(s) IV Continuous <Continuous>  dextrose 50% Injectable 25 Gram(s) IV Push once  dextrose 50% Injectable 12.5 Gram(s) IV Push once  dextrose 50% Injectable 25 Gram(s) IV Push once  digoxin     Tablet 125 MICROGram(s) Oral daily  DULoxetine 30 milliGRAM(s) Oral <User Schedule>  enoxaparin Injectable 40 milliGRAM(s) SubCutaneous every 24 hours  ferrous    sulfate 325 milliGRAM(s) Oral daily  furosemide   Injectable 40 milliGRAM(s) IV Push two times a day  glucagon  Injectable 1 milliGRAM(s) IntraMuscular once  insulin glargine Injectable (LANTUS) 10 Unit(s) SubCutaneous at bedtime  insulin lispro (ADMELOG) corrective regimen sliding scale   SubCutaneous three times a day before meals  latanoprost 0.005% Ophthalmic Solution 1 Drop(s) Both EYES at bedtime  levothyroxine 25 MICROGram(s) Oral daily  metoprolol succinate ER 25 milliGRAM(s) Oral daily  multivitamin 1 Tablet(s) Oral daily  mupirocin 2% Ointment 1 Application(s) Topical two times a day  naloxone Injectable 1 milliGRAM(s) IV Push once  pantoprazole    Tablet 40 milliGRAM(s) Oral before breakfast  prasugrel 10 milliGRAM(s) Oral daily  QUEtiapine 50 milliGRAM(s) Oral at bedtime    PRN MEDICATIONS  dextrose Oral Gel 15 Gram(s) Oral once PRN                                            ------------------------------------------------------------    LABS:                        12.3   7.93  )-----------( 421      ( 27 Jan 2023 07:19 )             42.8     01-27    140  |  92<L>  |  29<H>  ----------------------------<  110<H>  4.1   |  36<H>  |  1.0    Ca    9.4      27 Jan 2023 07:19  Mg     1.9     01-27    TPro  7.4  /  Alb  3.4<L>  /  TBili  1.4<H>  /  DBili  x   /  AST  30  /  ALT  23  /  AlkPhos  142<H>  01-27    CAPILLARY BLOOD GLUCOSE  POCT Blood Glucose.: 146 mg/dL (27 Jan 2023 07:41)  POCT Blood Glucose.: 167 mg/dL (26 Jan 2023 21:42)  POCT Blood Glucose.: 218 mg/dL (26 Jan 2023 16:56)  POCT Blood Glucose.: 295 mg/dL (26 Jan 2023 11:39)                                            -------------------------------------------------------------  RADIOLOGY:    < from: Xray Chest 1 View-PORTABLE IMMEDIATE (Xray Chest 1 View-PORTABLE IMMEDIATE .) (01.26.23 @ 10:37) >  Impression:  No radiographic evidence of acute cardiopulmonary disease.  Improved.                                            --------------------------------------------------------------

## 2023-01-27 NOTE — PROGRESS NOTE ADULT - PROVIDER SPECIALTY LIST ADULT
CCU
Hospitalist
Internal Medicine
Cardiology
Cardiology
Internal Medicine
Internal Medicine
Hospitalist
Internal Medicine
Hospitalist
Hospitalist
Internal Medicine
Infectious Disease
Infectious Disease

## 2023-01-27 NOTE — CHART NOTE - NSCHARTNOTEFT_GEN_A_CORE
Pt seen and examined at bedside. Pt feels well. I spoke with wife ystserday, couldn't take pt home then. She was agreeable for dc today. Pt discharged.    PHYSICAL EXAM:  GENERAL: NAD, speaks in full sentences, no signs of respiratory distress  HEAD:  Atraumatic, Normocephalic  EYES: EOMI, PERRLA, conjunctiva and sclera clear  NECK: Supple, No JVD  CHEST/LUNG: Clear to auscultation bilaterally; No wheeze; No crackles; No accessory muscles used  HEART: Regular rate and rhythm; No murmurs;   ABDOMEN: Soft, Nontender, Nondistended; Bowel sounds present; No guarding  EXTREMITIES: right AKA, left leg wrapped  PSYCH: AAOx3  NEUROLOGY: non-focal  SKIN: No rashes or lesions

## 2023-01-27 NOTE — PROGRESS NOTE ADULT - REASON FOR ADMISSION
recurrent cellulitis

## 2023-01-27 NOTE — PROGRESS NOTE ADULT - ASSESSMENT
66 yo M w HTN, HLD, CAD/MI s/p CABG/VERONICA, severe HF/ICM s/p AICD, Uncontrolled DM w peripheral neuropathy, R AKA, MRSA bacteremia, ?Psoriasis (on topical steroids), L ankle surgery with fixation hardware presents for recurrent cellulitis.     #Acute Hypoxic Respiratory failure  #Pulmonary Edema  #Acute HFrEF: s/p AICD  - Episode of hypoxia 1/21, Pro-BNP 99677  - CXR showed bilateral pleural effusion, bilateral infiltrates.   - Echo showed severe reduced LVEF <15%,   - Started on Lasix 40mg IV BID.   - Fluid restriction 1.5L/day. Low sodium diet, daily weight.     #Toxic metabolic encephalopathy  #From Hypoglycemia vs hospital acquired delirium vs digoxin toxicity  - Head CT was negative. EEG unremarkable   - hold digoxin  - digoxin level 1/23: 1.4 (N)    #Multiple Left lower extremity wound: Multiple small ulcer, erosion, [place].   #Bilateral Hand erosions and excoriations.   - No evidence of cellulitis.   - Arterial duplex 1/11 was negative.   - Patient was evaluated by podiatry, ID, dermatology and Burn.   - s/p brief antibiotics, discontinued.   - Blood cx no growth.   - continue current wound care.     #CAD s/p CABG, PCI and VERONICA  - No chest pain, Troponin 0.05 stable.   - Cardiac cath Dec 2018.   - Continue ASA, Effient, Metoprolol and Lipitor 80mg daily.     #DM type 2: uncontrolled, ( last A1c 12) formerly on insulin pump  - HbA1c: 9.8%  - FS is controlled, Hypoglycemia resolved.   - Can restart Lantus once oral intake improves.     #Hypothyroidism  - TSH 5.61, FT4 1.2  - Continue Synthroid    #Depression/Anxiety  - continue duloxetine 30mg TID.   - Restless leg syndrome:  on valium 2mg bid prn at home- held here     #Celiac Disease  - Nutrition f/u and vitamin replacement prn    #Other  DVT PPX: LVX 40 QD  GI Prophylaxis: PPI QD   Diet: DASH/TLC carb consistent, 1.5L fluid restricted  Activity: IAT  Pending (specify): clinical improvement  Family discussion: updated at bedside  Disposition: maki

## 2023-01-31 NOTE — HISTORY OF PRESENT ILLNESS
[Finger Stick] : Finger Stick: Yes [Home] : at home, [unfilled] , at the time of the visit. [Medical Office: ([facility])___] : at the medical office located in  [Spouse] : spouse [Verbal consent obtained from patient] : the patient, [unfilled] [FreeTextEntry1] : 67 year old male with PMH of CAD s/p MI, PCI/CABG, CHFrEF (15-20%), has ICD, HTN, celiac disease, insulin dependant DM  , neuropathy, chronic b/l LE ulcers presents, severe chronic pain, 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, and history of cholecystostomy tube placement in February 2020 for acute cholecystitis s/p removal in May 2020 with multiple presentations including persistent bilious drainage from the prior tract site as well as a RUQ abdominal abscess at the site s/p drainage,now s/p cholecystectomy ( 6/2022) ,  septic arthritis in June, 2021 where they performed arthroscopic drainage, now for follow up insulin dependant diabetes \par ( telephone visit ) \par \par \par Diagnosis  >30 years \par Current Regimen:  insulin Humalog in pump and basal/bolus as back up \par Previous regimens: was on po meds but taken off , has been on insulin pump for 5-6 years now  \par Compliance: ok , NOT using bolus wizard \par SMBG/CGM :  has Freestyle storm 14 days \par \par  \par prevention  \par Statin: yes rosuvastatin 40 mg \par \par Eye examination: yes \par Neuropathy: yes on Cymbalta \par \par \par 2/2022: since last visit patient had unfortunately complication with the right foot and ended up with amputation, when in hospital and rehab was on MDI , now back on the pump with a basal rate of 2 units /hr reports good numbers and no major hypo/hyperglycemia, is not using bolus wizard and , has some 200s post prandial \par off jardiance \par \par 7/2022: patient present today for follow up , with wife, had recent hospitalizations last one for cholecystectomy . and dose of insulin was reduced in pump. \par CGM review with high BG and hypoglycemia at night around 3-4 am , denies taking boluses at night \par his neuropathy is worse as he is on lower dose duloxetine , and iff gabapentin \par - remain on statin and on levothyroxine 25 mcg daily \par \par \par 10/2022: Telehealth visit follow up \par - Over the past month patient was feeling weak and was switched from insulin pump to MDI ( wife is helping with administration ) \par - BG log reviewed remain high n 200s when on basaglar 42 units and lispro 26 units TIDAC , appetite is low and even if taking NovoLog and not eating numbers in hgih 100 , no hypoglycemia \par has been on antibiotics for LE infection and also for UTI  \par \par \par 1/2023: Telephone visit \par patient was admitted to the hospital , during hospitalization    was not eating much and required minimal insulin .\par now since at home eating better , leading to hyperglycemia despite restarting insulin with basaglar 62 units and novololg 30-32 units TIDAC \par \par \par   [Hypoglycemia] : Patient is not hypoglycemic. [FreeTextEntry3] :  240 31%  < 70 10%  [FreeTextEntry9] : log scanned attached

## 2023-01-31 NOTE — PHYSICAL EXAM
[Alert] : alert [No Acute Distress] : no acute distress [No Respiratory Distress] : no respiratory distress [No Accessory Muscle Use] : no accessory muscle use [No Stigmata of Cushings Syndrome] : no stigmata of Cushings Syndrome [Oriented x3] : oriented to person, place, and time [de-identified] : visual only through video  [de-identified] : right amputation  [de-identified] : spastic movements

## 2023-01-31 NOTE — REASON FOR VISIT
[Follow - Up] : a follow-up visit [DM Type 1] : DM Type 1 [Home] : at home, [unfilled] , at the time of the visit. [Medical Office: ([facility])___] : at the medical office located in  [Spouse] : spouse

## 2023-01-31 NOTE — REVIEW OF SYSTEMS
No retinal detachment or retinal tear noted. [Fatigue] : fatigue [Blurred Vision] : blurred vision [Nausea] : nausea [As Noted in HPI] : as noted in HPI [Pain/Numbness of Digits] : pain/numbness of digits [Recent Weight Gain (___ Lbs)] : no recent weight gain [Recent Weight Loss (___ Lbs)] : no recent weight loss [Fever] : no fever [Dysphagia] : no dysphagia [Neck Pain] : no neck pain [Dysphonia] : no dysphonia [Chest Pain] : no chest pain [Palpitations] : no palpitations [Fast Heart Rate] : heart rate is not fast [Shortness Of Breath] : no shortness of breath [Wheezing] : no wheezing [SOB on Exertion] : no shortness of breath on exertion [Constipation] : no constipation [Vomiting] : no vomiting [Diarrhea] : no diarrhea [Dysuria] : no dysuria [Muscle Weakness] : no muscle weakness [Acne] : no acne [Dizziness] : no dizziness [Cold Intolerance] : no cold intolerance

## 2023-01-31 NOTE — THERAPY
[Today's Date] : [unfilled] [Basaglar] : Basaglar [Novolog] : Novolog [FreeTextEntry9] : 62 units  [de-identified] : 30-32 units TIDAC  [de-identified] : [employer]

## 2023-01-31 NOTE — ASSESSMENT
[Diabetes Foot Care] : diabetes foot care [Long Term Vascular Complications] : long term vascular complications of diabetes [Importance of Diet and Exercise] : importance of diet and exercise to improve glycemic control, achieve weight loss and improve cardiovascular health [Exercise/Effect on Glucose] : exercise/effect on glucose [Self Monitoring of Blood Glucose] : self monitoring of blood glucose [FreeTextEntry1] : 67 year old male with PMH of CAD s/p MI, PCI/CABG, CHFrEF (15-20%), has ICD, HTN, celiac disease, insulin dependant DM  , neuropathy, chronic b/l LE ulcers presents, severe chronic pain, 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, and history of cholecystostomy tube placement in February 2020 for acute cholecystitis s/p removal in May 2020 with multiple presentations including persistent bilious drainage from the prior tract site as well as a RUQ abdominal abscess at the site s/p drainage and now s/p cholecystectomy 6/2022, septic arthritis in June, 2021 where they performed arthroscopic drainage, now for follow up insulin dependant diabetes , post hospital discharge \par \par \par #insulin dependant, type 1 DM ( EDUIN treated as type 1 ) \par - off insulin pump now and he feels weak to control, and wife helping with MDI \par - SMBG with above target numbers  as now post hospital discharge and eating better \par - will split basaglar to BID and do 40 units in am and 25 units in pm \par -- novolog 30 -36 units based on sliding scale with ISF 1: 40 , hypoglycemia risk discussed if not eating well \par - resend log in 2-3 weeks and will adjust further \par \par - sever neuropathy,  on duloxetine  90 mg (total )  as he is also off gabapentin \par \par #hypothyroidism \par - TSH high , continue lt4 25 mcg 6days/ week and 50 mcg on day 7 \par recent TSh 5 continue same for now \par \par \par \par \par

## 2023-02-02 NOTE — BEHAVIORAL HEALTH ASSESSMENT NOTE - NSBHCONSULTDISCUSS_PSY_A_CORE
yes Qbrexza Counseling:  I discussed with the patient the risks of Qbrexza including but not limited to headache, mydriasis, blurred vision, dry eyes, nasal dryness, dry mouth, dry throat, dry skin, urinary hesitation, and constipation.  Local skin reactions including erythema, burning, stinging, and itching can also occur.

## 2023-02-07 NOTE — ED PROVIDER NOTE - CPE EDP NEURO NORM
Dear Genia Gonzalez    Thank you for choosing AdventHealth Lake Mary ER [facility]  [address] [place] [address]  [id number]  Phone:  [phone]    normal...

## 2023-02-22 NOTE — ED PROVIDER NOTE - CLINICAL SUMMARY MEDICAL DECISION MAKING FREE TEXT BOX
67 yr old m that presents with sob. labs, ekg, imaging, iv lasix. consider admission for chf exacerbation.  Labs and EKG were ordered and reviewed.  Imaging was ordered and reviewed by me.  Appropriate medications for patient's presenting complaints were ordered and effects were reassessed.  Patient's records (prior hospital, ED visit, and/or nursing home notes if available) were reviewed.  Additional history was obtained from EMS, family, and/or PCP (where available).  Escalation to admission/observation was considered. Patient requires inpatient hospitalization, pt admitted to telemetry for further evaluation.

## 2023-02-22 NOTE — ED PROVIDER NOTE - NS ED ATTENDING STATEMENT MOD
This was a shared visit with the SAROJ. I reviewed and verified the documentation and independently performed the documented:

## 2023-02-22 NOTE — ED PROVIDER NOTE - OBJECTIVE STATEMENT
67-year-old male with history of hypertension, hyperlipidemia, CAD, MI status post CABG, severe heart failure last EF less than 15% status post AICD, diabetes with peripheral neuropathy, right BKA presenting to ER with 1 week of worsening shortness of breath, edema to left lower extremity and worsening orthopnea.  No new cough, fever, chest pain, nausea, vomiting, diarrhea, diaphoresis, flank pain, sick contacts.

## 2023-02-22 NOTE — ED PROVIDER NOTE - PHYSICAL EXAMINATION
CONSTITUTIONAL: Well-appearing; well-nourished; in no apparent distress.   EYES: PERRL; EOM intact.   ENT: normal nose; no rhinorrhea; normal pharynx with no tonsillar hypertrophy.   NECK: Supple; non-tender; no cervical lymphadenopathy.   CARDIOVASCULAR: Normal S1, S2; no murmurs, rubs, or gallops.   RESPIRATORY: Normal chest excursion with respiration; no tachypnea. no hypoxia. no retractions. + rales at bases.   GI/: Normal bowel sounds; non-distended; non-tender; no palpable organomegaly.   MS: No evidence of trauma or deformity. Normal ROM in all four extremities; non-tender to palpation; distal pulses are normal.   Extrem: s/p rt AKA, LLE: + peripheral pitting edema  SKIN: + chronic skin changes to b/l hands with excoriations (hx of psoriasis)  NEURO/PSYCH: A & O x 4; grossly unremarkable. mood and manner are appropriate. Grooming and personal hygiene are appropriate. No apparent thoughts of harm to self or others.

## 2023-02-22 NOTE — ED PROVIDER NOTE - ATTENDING APP SHARED VISIT CONTRIBUTION OF CARE
67-year-old male with a past medical history significant for hypertension hyperlipidemia CAD status post MI and CABG CHF AICD diabetes right BKA who presents with 1 week of shortness of breath.  Patient states that he has also noticed worsening lower extremity swelling to the left lower extremity.  Patient denies any other medical complaints.    VITAL SIGNS: I have reviewed nursing notes and confirm.  CONSTITUTIONAL: non-toxic, well appearing  SKIN: no rash, no petechiae.  EYES:  EOMI, pink conjunctiva, anicteric  ENT: tongue midline, no exudates, MMM  NECK: Supple; no meningismus, no JVD  CARD: RRR, no murmurs, equal radial pulses bilaterally 2+  RESP: CTAB, no respiratory distress  ABD: Soft, non-tender, non-distended, no peritoneal signs, no HSM, no CVA tenderness  EXT: Normal ROM x3. LLE edema +2  NEURO: Alert, oriented x3. CN2-12 intact, equal strength bilaterally, nl gait.    67 yr old m that presents with sob. labs, ekg, imaging, iv lasix. consider admission for chf exacerbation.

## 2023-02-22 NOTE — ED ADULT TRIAGE NOTE - NS ED NURSE AMBULANCES
Verbalizes understanding of POC. Negative orthostatics, see vitals flowsheet. Continuous pulse ox used while ambulating to restroom. She maintained in upper 90's but had difficulty keeping adequate waveform. She felt symptomatic (dizzy, short of breath) but had SpO2 99% with appropriate waveform, HR 54 at the time. Ambulated with standby assist back to [facility]. Fluids infusing per MAR.    [facility]

## 2023-02-22 NOTE — ED ADULT NURSE NOTE - OBJECTIVE STATEMENT
Pt presents with sob x few days hx of r sided heart failure reports he uses lasix as needed. aao x4, pt has leftr sided aka, hx of diabetes/ pt noted to have left leg wound covered with gauze, clean and dressed.

## 2023-02-23 NOTE — CONSULT NOTE ADULT - SUBJECTIVE AND OBJECTIVE BOX
Podiatry Consult Note    Subjective:  FLOWER MARISCAL  Seen Bedside 67y Male  .   Patient is a 67y old  Male who presents with a chief complaint of CHF (23 Feb 2023 02:03)    HPI:  67-year-old male w/ HFrEF 15-20% s/p ICD, decreased RV function, mild MR/TR, DM type I w/ neuropathy and hx hypoglycemia (3 episodes in past 2 weeks), DL, HTN, CAD, hx MI, s/p CABG, s/p stent (2018), hx in-stent thrombosis while on Plavix, PAD s/p 3 LLE stents (2 months prior to admission), TIAGO (needs CPAP auto-regulating pressure 10-16, patient doesn't use it due to claustrophobia), Psoriasis presenting w/ worsening shortness of breath over the past week, increased Lasix at home today. Can't measure weight at home due to right AKA. Denies fever, chills, N/V, cough, sputum, sick contacts, CP. Complains of b/l hand excruciating pain w/o benefit of topical steroids. Patient saw outpatient dermatologist who wasn't sure about Dx but suspected contact dermatitis and prescribed the steroid cream (Triamcinolone). Patient has erythema, tenderness, swelling. Symptoms are causing distress as the patient lost his dexterity because of them.    PMHx PVCs, Osteopenia, right AKA, hx septic arthritis, hx MRSA bacteremia, left ankle Sx, hypothyroidism, MDD/Anxiety, RLS, hx cholecystits s/p cholecystostomy (drain removed, fistula still in place)esophagitis, diverticulosis w/ chung diverticulitis (23 Feb 2023 02:03)      Past Medical History and Surgical History  PAST MEDICAL & SURGICAL HISTORY:  Status post percutaneous transluminal coronary angioplasty  2 stents      Atherosclerosis of coronary artery  CAD (coronary artery disease)      Osteoarthritis      HLD (hyperlipidemia)      Diabetes  on insulin pump      CHF (congestive heart failure)  EF ~ 25%      History of celiac disease      Diverticulitis      STEMI (ST elevation myocardial infarction)      Diabetic neuropathy  Hands and Feet      Anxiety and depression      Other postprocedural status  Fixation hardware in foot LEFT      Stented coronary artery  10/18 heart attack  INFERIOR WALL MI      S/P CABG x 1  2018      S/P TKR (total knee replacement), right  with infection Mrsa   per pt he was cleared from MRSA infection      Surgery, elective  right knee wound debridement      History of open reduction and internal fixation (ORIF) procedure  right hip      H/O shoulder surgery  right      AICD (automatic cardioverter/defibrillator) present  [employer]      Cholecystostomy care  drain inserted 2020 &amp; removed 4 months ago      History of tonsillectomy      History of hip replacement, total, right      Elective surgery  plastic surgery Left shin           Review of Systems:  [X] Ten point review of systems is otherwise negative except as noted     Objective:  Vital Signs Last 24 Hrs  T(C): 36.5 (23 Feb 2023 07:35), Max: 36.5 (23 Feb 2023 07:35)  T(F): 97.7 (23 Feb 2023 07:35), Max: 97.7 (23 Feb 2023 07:35)  HR: 86 (23 Feb 2023 12:36) (85 - 90)  BP: 116/92 (23 Feb 2023 12:36) (106/58 - 116/92)  BP(mean): --  RR: 20 (23 Feb 2023 12:36) (18 - 20)  SpO2: 97% (23 Feb 2023 12:36) (96% - 98%)    Parameters below as of 23 Feb 2023 12:36  Patient On (Oxygen Delivery Method): room air                            10.9   9.96  )-----------( 317      ( 22 Feb 2023 16:25 )             37.3                 02-22    134<L>  |  97<L>  |  41<H>  ----------------------------<  219<H>  4.8   |  26  |  1.0    Ca    8.7      22 Feb 2023 16:25  Mg     1.8     02-22    TPro  7.4  /  Alb  3.4<L>  /  TBili  0.8  /  DBili  x   /  AST  39  /  ALT  42<H>  /  AlkPhos  196<H>  02-22        Physical Exam - Lower Extremity Focused:   Derm:  Multiple superficial scab lesions on the anterior left leg, scab over the 1st MPJ, no drainage noted. gangrenous patch noted on the plantar aspect of the distal 2nd left digit.   Vascular: DP and PT Pulses Diminished; Foot is cold to Warm to the touch; Capillary Refill Time < 3 Seconds;    Neuro: Protective Sensation Diminished / Moderately Neuropathic   MSK: No Pain On Palpation     Assessment:   DFU     Plan:  Chart reviewed and Patient evaluated. All Questions and Concerns Addressed and Answered  XR Imaging Left Foot; Pending Results  Local Wound Care; Betadine paint to the affected area   Weight Bearing Status; WBAT w/ Heel Touch w/ Surgical Shoe;   Recommend; Lower Extremity Arterial Duplex B/L;     Discussed Plan w/     Podiatry

## 2023-02-23 NOTE — H&P ADULT - HISTORY OF PRESENT ILLNESS
67-year-old male w/ HFrEF 15-20% s/p ICD, decreased RV function, mild MR/TR, DM type I w/ neuropathy and hx hypoglycemia (3 episodes in past 2 weeks), DL, HTN, CAD, hx MI, s/p CABG, s/p stent (2018), hx in-stent thrombosis while on Plavix, PAD s/p 3 LLE stents (2 months prior to admission), TIAGO (needs CPAP auto-regulating pressure 10-16, patient doesn't use it due to claustrophobia), Psoriasis presenting w/ worsening shortness of breath over the past week, increased Lasix at home today. Can't measure weight at home due to right AKA. Denies fever, chills, N/V, cough, sputum, sick contacts, CP. Complains of b/l hand excruciating pain w/o benefit of topical steroids. Patient saw outpatient dermatologist who wasn't sure about Dx but suspected contact dermatitis and prescribed the steroid cream (Triamcinolone). Patient has erythema, tenderness, swelling. Symptoms are causing distress as the patient lost his dexterity because of them.    PMHx PVCs, Osteopenia, right AKA, hx septic arthritis, hx MRSA bacteremia, left ankle Sx, hypothyroidism, MDD/Anxiety, RLS, hx cholecystits s/p cholecystostomy (drain removed, fistula still in place)esophagitis, diverticulosis w/ chung diverticulitis

## 2023-02-23 NOTE — H&P ADULT - CONVERSATION DETAILS
Patient wishes full measures be taken including heart transplant if needed as he wishes to attend his daughter's wedding in 1.5 months

## 2023-02-23 NOTE — H&P ADULT - NSHPPHYSICALEXAM_GEN_ALL_CORE
VITAL SIGNS: AFebrile, vital signs stable  CONSTITUTIONAL: Well-developed; well-nourished; in mild-mod pain  SKIN: swelling, erythema, tenderness w/ small granulomatous ulcerations & nodules of b/l UE and LLE, normal underlying joint ROM. Nodules on b/l elbows. No ulcers between toes, dry blood on 2nd toe of LLE that oozes sometimes, no ulcer on toe. Other ganulomatous ulcers in LLE  HEAD: Normocephalic; atraumatic.  EYES: Extraocular movements intact; conjunctiva and sclera clear.  ENT: No nasal discharge; airway clear. Moist mucus membranes.  NECK: Supple; non tender. No rigidity. Mild HJR  CARD: Regular rate and rhythm. Normal S1, S2; no murmurs, gallops, or rubs.  RESP: Lungs clear to auscultation bilaterally. No wheezes, rales or rhonchi.  ABD: Abdomen soft; non-tender; non-distended;    EXT: Normal ROM limited by pain. 2+ LE edema reaching upper shin. No calf tenderness to palpation.  NEURO: Alert and oriented x 3. moving all extremities  PSYCH: Cooperative, appropriate.

## 2023-02-23 NOTE — H&P ADULT - ASSESSMENT
67-year-old male w/ HFrEF 15-20% s/p ICD, decreased RV function, mild MR/TR, DM type I w/ neuropathy and hx hypoglycemia (3 episodes in past 2 weeks), DL, HTN, CAD, hx MI, s/p CABG, s/p stent (2018), hx in-stent thrombosis while on Plavix, PAD s/p 3 LLE stents (2 months prior to admission), TIAGO (needs CPAP auto-regulating pressure 10-16, patient doesn't use it due to claustrophobia), Psoriasis presenting w/ worsening shortness of breath over the past week, increased Lasix at home today. Can't measure weight at home due to right AKA. Denies fever, chills, N/V, cough, sputum, sick contacts, CP. Complains of b/l hand excruciating pain w/o benefit of topical steroids. Patient saw outpatient dermatologist who wasn't sure about Dx but suspected contact dermatitis and prescribed the steroid cream (Triamcinolone). Patient has erythema, tenderness, swelling. Symptoms are causing distress as the patient lost his dexterity because of them.    # HF exacerbation  # HFrEF 15-20% s/p ICD  # decreased RV function  # mild MR/TR  # DM type I w/ neuropathy and hx hypoglycemia (3 episodes in past 2 weeks)  # DL  # HTN  # CAD  # hx MI  # s/p CABG  # s/p stent (2018)  # hx in-stent thrombosis while on Plavix  # PAD s/p 3 LLE stents (2 months prior to admission)  # PVCs  - increase lasix to 40 iv bid (takes 40 po bid at home, 800 ml in ED post IV Lasix)  - daily weight, accurate I/O, fluid restriction  - c/w aspirin, prasugrel, metoprolol, digoxin, spironolactone  - Allergic to ACE-I/Entresto  - check digoxin level, was elevated last time  - no replacement for rosuvastatin in EHR (allergy? to atorvastatin)  - decrease insulin by 20% given hypoglycemia hx    # right AKA  # hx septic arthritis  # hx MRSA bacteremia, left ankle Sx,   - c/s PT    # Psoriasis  # Suspected connective tissue disorder  # Suspected cellulitis  - switch triamcinolone to clobetasol  - consider Rheum c/s  - f/u OBED, CK, aldolase, ssa, ssb, rnp, scl Ab  - f/u ESR, CRP, Procal  - hx of MRSA bacteremia; no pus on exam  - consider d/c Antibiotics depending on clinical development  - c/s Derm for possible bx  - monitor PMN    # Elevated ALP  - f/u GGT    # TIAGO (needs CPAP auto-regulating pressure 10-16, patient doesn't use it due to claustrophobia)  - consider CPAP if suspecting CO2 retention    # Osteopenia  - outpatient f/u    # hypothyroidism  - c/w levothyroxine    # MDD/Anxiety  # RLS  # Insomnia/Parasomnia  - c/w Diazepam (started by neuro)  - c/w Duloxetine    # hx cholecystits s/p cholecystostomy (drain removed, fistula still in place)  - c/s IR    #esophagitis  - c/w Protonix    # diverticulosis w/ chung diverticulitis  - high fiber diet    # Code FULL    # Diet dash, tlc, cc, high fiber, 1.2 L fluid rest    # GI PPx Protonix

## 2023-02-23 NOTE — H&P ADULT - ATTENDING COMMENTS
Patient seen and examined at bedside independently of the residents. I read the resident's note and agree with the plan with the additions and corrections as noted below. My note supersedes the resident's note.     REVIEW OF SYSTEMS:  CONSTITUTIONAL: No weakness, fevers or chills  EYES/ENT: No visual changes;  No vertigo or throat pain   NECK: No pain or stiffness  RESPIRATORY: No cough, wheezing, hemoptysis; + shortness of breath  CARDIOVASCULAR: No chest pain or palpitations  GASTROINTESTINAL: No abdominal or epigastric pain. No nausea, vomiting, or hematemesis; No diarrhea or constipation. No melena or hematochezia.  GENITOURINARY: No dysuria, frequency or hematuria  NEUROLOGICAL: No numbness or weakness  MSK: No pain. No weakness. + leg swelling.   SKIN: No itching, rashes.     PMH: HFrEF (LVEF < 15%) s/p AICD, HTN, HLD, DM II, CAD/MI s/p CABG, peripheral neuropathy, right BKA     FHx: Reviewed. No fhx of asthma/copd, No fhx of liver and pulmonary disease. No fhx of hematological disorder.     Physical Exam:  GEN: No acute distress. Awake, Alert and oriented x 3.   Head: Atraumatic, Normocephalic.   Eye: PEERLA. No sclera icterus. EOMI.   ENT: Normal oropharynx, no thyromegaly, no mass, no lymphadenopathy.   LUNGS: Crackles b/l lung fields.   HEART: Normal. S1/S2 present. RRR. No murmur/gallops.   ABD: Soft, non-tender, non-distended. Bowel sounds present.   EXT: 2+ pitting edema of LLE. RLE BKA. B/L UE chronic skin lesions with erythema with scratch marks.   Integumentary: No rash, No sore, No petechia.   NEURO: CN III-XII intact. Strength: 5/5 b/l ULE. Sensory intact b/l ULE.     Vital Signs Last 24 Hrs  T(C): 36.5 (2023 14:50), Max: 36.5 (2023 14:50)  T(F): 97.7 (2023 14:50), Max: 97.7 (2023 14:50)  HR: 91 (2023 14:50) (91 - 91)  BP: 115/76 (2023 14:50) (115/76 - 115/76)  BP(mean): --  RR: 18 (2023 14:50) (18 - 18)  SpO2: 96% (2023 14:50) (96% - 96%)    Parameters below as of 2023 14:50  Patient On (Oxygen Delivery Method): room air    Please see the above notes for Labs and radiology.     Assessment and Plan:     68 yo M with hx of HFrEF (LVEF < 15%) s/p AICD, HTN, HLD, DM II, CAD/MI s/p CABG, peripheral neuropathy, right BKA presents to ED for 1 weeks history of SOB and LLE swelling.     Chronic skin lesions   - Ddx: Contact dermatitis vs. connective tissue disorder  (r/o infection)  - will start on ancef   - check procalcitonin. If negative, may dc abx.   - c/w topical steroid for now.   - check OBED, CK, Aldolase, ESR, CRP, TSH, anti-scl, anti-sjogren   - Dermatology consult for possible skin bx.     Acute HFrEF exacerbation   - CXR shows Bilateral opacifications and effusions, right greater than left.   - c/w IV lasix 40mg BID  - monitor daily weight, strict I & O, Low Na diet with fluid restriction.   - c/w home meds  - monitor on Telemetry.     CAD/MI s/p CABG - c/w home med  HTN/HLD - c/w home med  DM II - monitor FS AC HS. Insulin regimen.   Right BKA - follow up outpatient.   GERD - PPI   MDD/Anxiety - c/w home med    DVT ppx: Lovenox SC  GI ppx: not indicated.  Diet: DASH diet with fluid restriction.  Activity: as tolerated.      Date seen by the attendin2023  Total time spent: 75 minutes.

## 2023-02-24 NOTE — CONSULT NOTE ADULT - SUBJECTIVE AND OBJECTIVE BOX
HPI:  67-year-old male w/ HFrEF 15-20% s/p ICD, decreased RV function, mild MR/TR, DM type I w/ neuropathy and hx hypoglycemia (3 episodes in past 2 weeks), DL, HTN, CAD, hx MI, s/p CABG, s/p stent (2018), hx in-stent thrombosis while on Plavix, PAD s/p 3 LLE stents (2 months prior to admission), TIAGO (needs CPAP auto-regulating pressure 10-16, patient doesn't use it due to claustrophobia), Psoriasis presenting w/ worsening shortness of breath over the past week, increased Lasix at home today. Can't measure weight at home due to right AKA. Denies fever, chills, N/V, cough, sputum, sick contacts, CP. Complains of b/l hand excruciating pain w/o benefit of topical steroids. Patient saw outpatient dermatologist who wasn't sure about Dx but suspected contact dermatitis and prescribed the steroid cream (Triamcinolone). Patient has erythema, tenderness, swelling. Symptoms are causing distress as the patient lost his dexterity because of them.    PMHx PVCs, Osteopenia, right AKA, hx septic arthritis, hx MRSA bacteremia, left ankle Sx, hypothyroidism, MDD/Anxiety, RLS, hx cholecystits s/p cholecystostomy (drain removed, fistula still in place)esophagitis, diverticulosis w/ chung diverticulitis (23 Feb 2023 02:03)    BURN consulted for scattered wounds to LLE and BUE    Allergies    ACE inhibitors (Rash)  Atorvastatin Calcium (Unknown)  Bactrim (Unknown)  Entresto (Swelling)    Intolerances      PAST MEDICAL & SURGICAL HISTORY:  Status post percutaneous transluminal coronary angioplasty  2 stents      Atherosclerosis of coronary artery  CAD (coronary artery disease)      Osteoarthritis      HLD (hyperlipidemia)      Diabetes  on insulin pump      CHF (congestive heart failure)  EF ~ 25%      History of celiac disease      Diverticulitis      STEMI (ST elevation myocardial infarction)      Diabetic neuropathy  Hands and Feet      Anxiety and depression      Other postprocedural status  Fixation hardware in foot LEFT      Stented coronary artery  10/18 heart attack  INFERIOR WALL MI      S/P CABG x 1  2018      S/P TKR (total knee replacement), right  with infection Mrsa   per pt he was cleared from MRSA infection      Surgery, elective  right knee wound debridement      History of open reduction and internal fixation (ORIF) procedure  right hip      H/O shoulder surgery  right      AICD (automatic cardioverter/defibrillator) present  [employer]      Cholecystostomy care  drain inserted 2020 &amp; removed 4 months ago      History of tonsillectomy      History of hip replacement, total, right      Elective surgery  plastic surgery Left shin                              10.5   7.35  )-----------( 294      ( 23 Feb 2023 17:04 )             35.9     ICU Vital Signs Last 24 Hrs  T(C): 36.3 (24 Feb 2023 11:22), Max: 36.5 (23 Feb 2023 15:47)  T(F): 97.3 (24 Feb 2023 11:22), Max: 97.7 (23 Feb 2023 15:47)  HR: 80 (24 Feb 2023 14:34) (80 - 88)  BP: 118/59 (24 Feb 2023 14:34) (108/61 - 136/69)  RR: 20 (24 Feb 2023 11:22) (18 - 20)  SpO2: 96% (24 Feb 2023 11:22) (95% - 100%)    O2 Parameters below as of 24 Feb 2023 11:22  Patient On (Oxygen Delivery Method): room air      PHYSICAL EXAM:  GENERAL: NAD, lying in bed comfortably   HEAD:  Atraumatic, Normocephalic  CHEST/LUNG: Breathing comfortably on room air. No increased work of breathing noted.   HEART: In no acute cardiopulmonary distress noted.   SKIN:            HPI:  67-year-old male w/ HFrEF 15-20% s/p ICD, decreased RV function, mild MR/TR, DM type I w/ neuropathy and hx hypoglycemia (3 episodes in past 2 weeks), DL, HTN, CAD, hx MI, s/p CABG, s/p stent (2018), hx in-stent thrombosis while on Plavix, PAD s/p 3 LLE stents (2 months prior to admission), TIAGO (needs CPAP auto-regulating pressure 10-16, patient doesn't use it due to claustrophobia), Psoriasis presenting w/ worsening shortness of breath over the past week, increased Lasix at home today. Can't measure weight at home due to right AKA. Denies fever, chills, N/V, cough, sputum, sick contacts, CP. Complains of b/l hand excruciating pain w/o benefit of topical steroids. Patient saw outpatient dermatologist who wasn't sure about Dx but suspected contact dermatitis and prescribed the steroid cream (Triamcinolone). Patient has erythema, tenderness, swelling. Symptoms are causing distress as the patient lost his dexterity because of them.    PMHx PVCs, Osteopenia, right AKA, hx septic arthritis, hx MRSA bacteremia, left ankle Sx, hypothyroidism, MDD/Anxiety, RLS, hx cholecystits s/p cholecystostomy (drain removed, fistula still in place)esophagitis, diverticulosis w/ chung diverticulitis (23 Feb 2023 02:03)    BURN consulted for scattered wounds to LLE and BUE    Allergies    ACE inhibitors (Rash)  Atorvastatin Calcium (Unknown)  Bactrim (Unknown)  Entresto (Swelling)    Intolerances      PAST MEDICAL & SURGICAL HISTORY:  Status post percutaneous transluminal coronary angioplasty  2 stents      Atherosclerosis of coronary artery  CAD (coronary artery disease)      Osteoarthritis      HLD (hyperlipidemia)      Diabetes  on insulin pump      CHF (congestive heart failure)  EF ~ 25%      History of celiac disease      Diverticulitis      STEMI (ST elevation myocardial infarction)      Diabetic neuropathy  Hands and Feet      Anxiety and depression      Other postprocedural status  Fixation hardware in foot LEFT      Stented coronary artery  10/18 heart attack  INFERIOR WALL MI      S/P CABG x 1  2018      S/P TKR (total knee replacement), right  with infection Mrsa   per pt he was cleared from MRSA infection      Surgery, elective  right knee wound debridement      History of open reduction and internal fixation (ORIF) procedure  right hip      H/O shoulder surgery  right      AICD (automatic cardioverter/defibrillator) present  [employer]      Cholecystostomy care  drain inserted 2020 &amp; removed 4 months ago      History of tonsillectomy      History of hip replacement, total, right      Elective surgery  plastic surgery Left shin                              10.5   7.35  )-----------( 294      ( 23 Feb 2023 17:04 )             35.9     ICU Vital Signs Last 24 Hrs  T(C): 36.3 (24 Feb 2023 11:22), Max: 36.5 (23 Feb 2023 15:47)  T(F): 97.3 (24 Feb 2023 11:22), Max: 97.7 (23 Feb 2023 15:47)  HR: 80 (24 Feb 2023 14:34) (80 - 88)  BP: 118/59 (24 Feb 2023 14:34) (108/61 - 136/69)  RR: 20 (24 Feb 2023 11:22) (18 - 20)  SpO2: 96% (24 Feb 2023 11:22) (95% - 100%)    O2 Parameters below as of 24 Feb 2023 11:22  Patient On (Oxygen Delivery Method): room air      PHYSICAL EXAM:  GENERAL: NAD, lying in bed comfortably   HEAD:  Atraumatic, Normocephalic  CHEST/LUNG: Breathing comfortably on room air. No increased work of breathing noted.   HEART: In no acute cardiopulmonary distress noted.   SKIN: BUE: Scattered areas of scabbing and excoriations noted over bony prominence   LLE: Anterior shin: area of excoriations and superficial wound with oozing noted. No purulent drainage, malodor, or active bleeding noted.     Dressing change performed. Patient tolerated well.

## 2023-02-24 NOTE — CONSULT NOTE ADULT - SUBJECTIVE AND OBJECTIVE BOX
Patient is a 67y old  Male who presents with a chief complaint of CHF (24 Feb 2023 18:08)      HPI:  67-year-old male w/ HFrEF 15-20% s/p ICD, decreased RV function, mild MR/TR, DM type I w/ neuropathy and hx hypoglycemia (3 episodes in past 2 weeks), DL, HTN, CAD, hx MI, s/p CABG, s/p stent (2018), hx in-stent thrombosis while on Plavix, PAD s/p 3 LLE stents (2 months prior to admission), TIAGO (needs CPAP auto-regulating pressure 10-16, patient doesn't use it due to claustrophobia), Psoriasis presenting w/ worsening shortness of breath over the past week, increased Lasix at home today. Can't measure weight at home due to right AKA. Denies fever, chills, N/V, cough, sputum, sick contacts, CP. Complains of b/l hand excruciating pain w/o benefit of topical steroids. Patient saw outpatient dermatologist who wasn't sure about Dx but suspected contact dermatitis and prescribed the steroid cream (Triamcinolone). Patient has erythema, tenderness, swelling. Symptoms are causing distress as the patient lost his dexterity because of them.    PMHx PVCs, Osteopenia, right AKA, hx septic arthritis, hx MRSA bacteremia, left ankle Sx, hypothyroidism, MDD/Anxiety, RLS, hx cholecystits s/p cholecystostomy (drain removed, fistula still in place)esophagitis, diverticulosis w/ chung diverticulitis (23 Feb 2023 02:03)      PAST MEDICAL & SURGICAL HISTORY:  Status post percutaneous transluminal coronary angioplasty  2 stents      Atherosclerosis of coronary artery  CAD (coronary artery disease)      Osteoarthritis      HLD (hyperlipidemia)      Diabetes  on insulin pump      CHF (congestive heart failure)  EF ~ 25%      History of celiac disease      Diverticulitis      STEMI (ST elevation myocardial infarction)      Diabetic neuropathy  Hands and Feet      Anxiety and depression      Other postprocedural status  Fixation hardware in foot LEFT      Stented coronary artery  10/18 heart attack  INFERIOR WALL MI      S/P CABG x 1  2018      S/P TKR (total knee replacement), right  with infection Mrsa   per pt he was cleared from MRSA infection      Surgery, elective  right knee wound debridement      History of open reduction and internal fixation (ORIF) procedure  right hip      H/O shoulder surgery  right      AICD (automatic cardioverter/defibrillator) present  [facility]      Cholecystostomy care  drain inserted 2020 &amp; removed 4 months ago      History of tonsillectomy      History of hip replacement, total, right      Elective surgery  plastic surgery Left shin          PREVIOUS DIAGNOSTIC TESTING:      ECHO  FINDINGS:    STRESS  FINDINGS:    CATHETERIZATION  FINDINGS:    MEDICATIONS  (STANDING):  aspirin enteric coated 81 milliGRAM(s) Oral daily  bacitracin   Ointment 1 Application(s) Topical two times a day  clobetasol 0.05% Cream 1 Application(s) Topical two times a day  dextrose 5%. 1000 milliLiter(s) (50 mL/Hr) IV Continuous <Continuous>  dextrose 5%. 1000 milliLiter(s) (100 mL/Hr) IV Continuous <Continuous>  dextrose 50% Injectable 25 Gram(s) IV Push once  dextrose 50% Injectable 12.5 Gram(s) IV Push once  dextrose 50% Injectable 25 Gram(s) IV Push once  diazepam    Tablet 1 milliGRAM(s) Oral at bedtime  digoxin     Tablet 125 MICROGram(s) Oral daily  DULoxetine 30 milliGRAM(s) Oral <User Schedule>  enoxaparin Injectable 30 milliGRAM(s) SubCutaneous every 24 hours  furosemide   Injectable 40 milliGRAM(s) IV Push two times a day  glucagon  Injectable 1 milliGRAM(s) IntraMuscular once  insulin glargine Injectable (LANTUS) 16 Unit(s) SubCutaneous every morning  insulin glargine Injectable (LANTUS) 10 Unit(s) SubCutaneous at bedtime  insulin lispro (ADMELOG) corrective regimen sliding scale   SubCutaneous three times a day before meals  levothyroxine 50 MICROGram(s) Oral <User Schedule>  levothyroxine 25 MICROGram(s) Oral <User Schedule>  metoprolol succinate ER 25 milliGRAM(s) Oral daily  multivitamin 1 Tablet(s) Oral daily  pantoprazole    Tablet 40 milliGRAM(s) Oral before breakfast  prasugrel 10 milliGRAM(s) Oral daily  spironolactone 25 milliGRAM(s) Oral daily    MEDICATIONS  (PRN):  acetaminophen     Tablet .. 650 milliGRAM(s) Oral every 6 hours PRN Temp greater or equal to 38C (100.4F), Mild Pain (1 - 3)  aluminum hydroxide/magnesium hydroxide/simethicone Suspension 30 milliLiter(s) Oral every 4 hours PRN Dyspepsia  dextrose Oral Gel 15 Gram(s) Oral once PRN Blood Glucose LESS THAN 70 milliGRAM(s)/deciliter  melatonin 3 milliGRAM(s) Oral at bedtime PRN Insomnia  ondansetron Injectable 4 milliGRAM(s) IV Push every 8 hours PRN Nausea and/or Vomiting  oxyCODONE    IR 10 milliGRAM(s) Oral every 6 hours PRN Moderate Pain (4 - 6)      FAMILY HISTORY:  Family history of heart disease (Mother)    Family history of allergies  daughter        SOCIAL HISTORY:  CIGARETTES:    ALCOHOL:    REVIEW OF SYSTEMS:  CONSTITUTIONAL: No fever, weight loss, or fatigue  NECK: No pain or stiffness  RESPIRATORY: No cough, wheezing, chills or hemoptysis; shortness of breath improved  CARDIOVASCULAR: No chest pain, palpitations, dizziness, or leg swelling  GASTROINTESTINAL: No abdominal or epigastric pain. No nausea, vomiting, or hematemesis; No diarrhea or constipation. No melena or hematochezia.  GENITOURINARY: No dysuria, frequency, hematuria, or incontinence  NEUROLOGICAL: No headaches, memory loss, loss of strength, numbness, or tremors  SKIN: No itching, burning, rashes, or lesions   ENDOCRINE: No heat or cold intolerance; No hair loss  MUSCULOSKELETAL: No joint pain or swelling  HEME/LYMPH: No easy bruising, or bleeding gums          Vital Signs Last 24 Hrs  T(C): 36.6 (24 Feb 2023 19:58), Max: 36.7 (24 Feb 2023 15:29)  T(F): 97.8 (24 Feb 2023 19:58), Max: 98 (24 Feb 2023 15:29)  HR: 78 (24 Feb 2023 19:58) (78 - 88)  BP: 92/81 (24 Feb 2023 19:58) (92/81 - 118/59)  BP(mean): --  RR: 18 (24 Feb 2023 19:58) (18 - 20)  SpO2: 96% (24 Feb 2023 19:58) (94% - 100%)    Parameters below as of 24 Feb 2023 11:22  Patient On (Oxygen Delivery Method): room air            PHYSICAL EXAM:  GENERAL: NAD, cachectic   HEAD:  Atraumatic, Normocephalic  NECK: Supple, No JVD, Normal thyroid  NERVOUS SYSTEM:  Alert & Oriented X3, Good concentration  CHEST/LUNG: decreased air entry bilaterally; No rales, rhonchi, wheezing, or rubs  HEART: Regular rate and rhythm  ABDOMEN: Soft, Nontender, Nondistended; Bowel sounds present  EXTREMITIES:  Rt AKA, no edema   SKIN: No rashes or lesions    INTERPRETATION OF TELEMETRY:    ECG:    I&O's Detail      LABS:                        10.5   7.35  )-----------( 294      ( 23 Feb 2023 17:04 )             35.9     02-23    137  |  100  |  36<H>  ----------------------------<  81  4.7   |  23  |  0.9    Ca    8.7      23 Feb 2023 17:04  Phos  4.5     02-23  Mg     1.7     02-23    TPro  6.9  /  Alb  3.3<L>  /  TBili  0.7  /  DBili  x   /  AST  24  /  ALT  33  /  AlkPhos  164<H>  02-23    CARDIAC MARKERS ( 23 Feb 2023 17:04 )  x     / x     / 73 U/L / x     / x              I&O's Summary      RADIOLOGY & ADDITIONAL STUDIES:        digoxin     Tablet 125 MICROGram(s) Oral daily  furosemide   Injectable 40 milliGRAM(s) IV Push two times a day  metoprolol succinate ER 25 milliGRAM(s) Oral daily  spironolactone 25 milliGRAM(s) Oral daily

## 2023-02-24 NOTE — CONSULT NOTE ADULT - ASSESSMENT
Acute HFrEF exacerbation  Ischemic cardiomyopathy  PAD  - Management as per primary team  - Please consider switching from  Furosemide  to Torsemide ( longer acting and better bioavailability )   - Continue Spironolactone   - Follow I/o and renal function   - ICD interrogation  - will follow as needed

## 2023-02-24 NOTE — PROGRESS NOTE ADULT - ASSESSMENT
a/p:  #Acute HFrEF exacerbation- h/o EF 15-20% s/p AICD  #CAD s/p CABG/PCI  #HTN  #h/o TIAGO but non-compliant with NIV 2/2 claustrophobia  -continue with iv lasix 40 mg bid--if continues to improve clinically plan for transition to oral lasix in next 24-48 hrs  - cardiology following  -bnp 9k  -telemetry monitoring  -repeat cxr in am  -monitor o2 sat and suppl o2 prn  -stress importance of compliance with   -daily weights, strict i/o and fluid restriction with low Na diet  -monitor electrolytes  -patient with allergy to ace inhib and arb and entresto  -f/u EPS for AICD interrogation     #Chronic skin lesions --r/o inflammatory vs ischemic vs infectious (less likely as patient is afebrile with normal wbc and no signs of infection)  #PAD with h/o Right AKA  - monitor wbc and fever curve-stop ancef (restart ABX and ID consult if febrile or develops elevated wbc )  -continue local wound care  - Derm and rheum consults pending  -local wound care---stump site c/d/i  -inflammatory changes seen on xray (including on left ulnar styloid)--? chronic OM  -check ESR, CRP, procal  -f/u bcx and may need biopsy with derm  -check Arterial duplex  -CRP-32 and ESR 15--both elevated  - f/u pending rheum w/u including OBED, Aldolase, anti-scl, anti-sjogren , ANCA, RF and antCCP, procal    #DM-uncontrolled with hypoglycemia today (likely 2/2 decreased po intake with higher lantus dose)  -Hba1c 9;2  -hypoglycemia this morning---titrate down lantus (held this am but dose decreased by half (pm -10 u lantus and am-16 u lantus)  -continue to monitor fs    DVT/GI ppx  guarded prognosis    FULL CODE    awaiting prosthesis to be delivered---PT as otuaptient    #Progress Note Handoff  Pending (specify): monitor fs and adjust lantus dose, f/u with eps for AICD interog, i/o and fluid status for transition to oral lasix, rheum and derm consults    Family discussion: d/w patient himself at length--medicine team d/w wife     Disposition: Unknown at this time____x____    Total time spent to complete patient's bedside assessment, review medical chart, discuss medical plan of care with covering medical team was more than 50 minutes  with >50% of time spendt face to face with patient, discussion with patient/family and/or coordination of care

## 2023-02-24 NOTE — CONSULT NOTE ADULT - NS ATTEND AMEND GEN_ALL_CORE FT
Patient seen in ED consultation for multiple wounds  Patient reports itching and he has been scratching area especially of left leg    Exam:  Patient awake alert with appropriate verbal responses  Status post right AKA-healed  Desiccated adherent gray-brown eschar at bony prominences-  Overlying MP joints,  knees, wrists , left fibular head and other scattered areas of the upper extremity  -Left leg-excoriated areas with some fresh blood and dried blood-no active bleeding; no edema or gross evidence of    A/P:    excoriated skin;   Dermatologic condition  Wound care as above to open wounds left leg   No surgical intervention  Recommend dermatology eval

## 2023-02-24 NOTE — CONSULT NOTE ADULT - ASSESSMENT
Ass/ Rec:  Venous stasis ulcer  LE   Infected  Wound care -   IV abx as indicated  Surgical debridement   Elevation and compression   Will follow. Ass/ Rec:  BUE and LLE wounds     Wound care - Please wash wounds with soap and water, apply bacitracin, cover with xeroform, wrap with kerlix twice a day.   No Surgical debridement needed at this time  Derm c/s    Remainder of care per primary team    Plan of care discussed with patient, Concerns addressed.

## 2023-02-25 NOTE — DIETITIAN INITIAL EVALUATION ADULT - NAME AND PHONE
Intervention: 1.Meals and Snacks 2.Medical Food Supplement 3.Vitamin Supplement 4.Nutrition Related Medication  Monitor/Evaluate: Diet order, energy intake, nutrition focused physical findings, K and anemia profile

## 2023-02-25 NOTE — DIETITIAN INITIAL EVALUATION ADULT - ORAL INTAKE PTA/DIET HISTORY
The patient followed a no concentrated sweets diet at home; consumed three meals >85%; took Solgar brand multivitamin daily. NKFA

## 2023-02-25 NOTE — DIETITIAN INITIAL EVALUATION ADULT - PERTINENT LABORATORY DATA
02-25    137  |  98  |  39<H>  ----------------------------<  124<H>  5.1<H>   |  27  |  1.0    Ca    8.6      25 Feb 2023 05:48  Mg     1.8     02-25    TPro  6.8  /  Alb  3.2<L>  /  TBili  0.7  /  DBili  x   /  AST  28  /  ALT  25  /  AlkPhos  161<H>  02-25  POCT Blood Glucose.: 137 mg/dL (02-25-23 @ 16:43)  A1C with Estimated Average Glucose Result: 9.2 % (02-23-23 @ 17:04)  A1C with Estimated Average Glucose Result: 9.8 % (01-12-23 @ 05:39)  A1C with Estimated Average Glucose Result: 12.0 % (06-16-22 @ 05:39)

## 2023-02-25 NOTE — DIETITIAN INITIAL EVALUATION ADULT - ADD RECOMMEND
1.If the patient's poor PO intake persist, recommend provide an appetite stimulant   2.Continue to provide a MVI once daily

## 2023-02-25 NOTE — DIETITIAN INITIAL EVALUATION ADULT - NSFNSGIIOFT_GEN_A_CORE
Dx: 68y/o male with h/o HFrEF 15-20% s/p ICD, decreased RV function, mild MR/TR, DM type I with neuropathy, hypoglycemia (3 episodes in past 2 weeks), DL, HTN, CAD, MI, s/p CABG, s/p stent (2018), in-stent thrombosis while on plavix, PAD, 3 LLE stents (2 months prior to admission), TIAGO (needs CPAP auto-regulating pressure 10-16, patient doesn't use it due to claustrophobia), psoriasis, right AKA, MRSA bacteremia, left ankle surgery with fixation hardware and hypothyroid and admission last month for recurrent cellulitis complicated by acute over chronic CHF and hypoglycemia now presented with worsening shortness of breath over the past week. Noted to have acute over chronic HFrEF. S/p AICD interrogation (2/24) with 7 episodes of NSVT (self limiting).

## 2023-02-25 NOTE — DIETITIAN NUTRITION RISK NOTIFICATION - TREATMENT: THE FOLLOWING DIET HAS BEEN RECOMMENDED
Diet, Consistent Carbohydrate w/Evening Snack:   High Fiber  Low Sodium  Supplement Feeding Modality:  Oral  Ensure Plus High Protein Cans or Servings Per Day:  1       Frequency:  Three Times a day (02-25-23 @ 22:12) [Pending Verification By Attending]  Diet, Regular:   Consistent Carbohydrate {No Snacks}  High Fiber  DASH/TLC {Sodium & Cholesterol Restricted}  1200mL Fluid Restriction ([id number])  Gluten-Gliadin Restricted (02-23-23 @ 08:43) [Active]

## 2023-02-25 NOTE — PATIENT PROFILE ADULT - FALL HARM RISK - HARM RISK INTERVENTIONS

## 2023-02-25 NOTE — PROGRESS NOTE ADULT - ASSESSMENT
66 y/o man with PMH of HFrEF 15-20% s/p ICD, decreased RV function, mild MR/TR, DM type I w/ neuropathy and hx hypoglycemia (3 episodes in past 2 weeks), DL, HTN, CAD s/p MI, s/p CABG, s/p stent (2018), hx in-stent thrombosis while on Plavix, PAD s/p 3 LLE stents (2 months prior to admission), TIAGO (needs CPAP auto-regulating pressure 10-16, patient doesn't use it due to claustrophobia), Psoriasis, s/p right AKA, MRSA bacteremia, left ankle surgery with fixation hardware and hypothyroid and admission last month for recurrent cellulitis complicated by acute over chronic CHF and hypoglycemia now presents with worsening shortness of breath over the past week (pt unable to weight himself due to right AKA). Previous notes reviewed by me.    1. Acute over chronic HFrEF (severely reduced EF on JOHN PAUL in 2022), s/p AICD  EKG reviewed and interpreted by me: ventricular pacing  telemetry reviewed  trop 0.03 (lower than in January 2023)  pro BNP 9K  continue lasix 40mg IV q12hr   monitor weights in bed  I's and O's, fluid restriction, low sodium diet  continue metoprolol and spironolactone  allergy to ACE and Entresto  telemetry  s/p AICD interrogation on 2/24: 7 episodes on NSVT (self limiting) - outpt f/u with EP recommended    2. CAD s/p MI, PCI, CABG  on ASA/prasugrel, metoprolol    3. DM type 1  episodes of hypoglycemia  insulin was adjusted yesterday and FS are improved and stable  continue current lantus and lispro dosages and monitor closely  A1C 9.2    4. Multiple skin lesions of wrist and hands  h/o psoriasis  seen by derm as outpt and given dx of contact dermatitis - on steroid cream without much improvement  rule out inflammatory arthritis - ? gout on xray of hands  ESR 15, CRP 32, blood cultures negative, procal 0.09  pt afebrile and WBC WNL  arterial duplex of UE: normal blood flow  f/u pending rheum w/u including OBED, Aldolase, anti-scl, anti-sjogren , ANCA, RF and antiCCP  derm and rheum evals pending    5. PVD s/p intervention  left LE with chronic skin changes and cool to touch  wound care  arterial duplex of Left LE: normal flow    6. Hypothyroid on synthroid    7. DVT prophylaxis      full code  very guarded prognosis  high risk pt  DVT/GI ppx  guarded prognosis    Progress Note Handoff  Pending (specify): improvement in volume status, labs in AM, rheum and derm consults    pt informed of the plan of care     Disposition: from home                     68 y/o man with PMH of HFrEF 15-20% s/p ICD, decreased RV function, mild MR/TR, DM type I w/ neuropathy and hx hypoglycemia (3 episodes in past 2 weeks), DL, HTN, CAD s/p MI, s/p CABG, s/p stent (2018), hx in-stent thrombosis while on Plavix, PAD s/p 3 LLE stents (2 months prior to admission), TIAGO (needs CPAP auto-regulating pressure 10-16, patient doesn't use it due to claustrophobia), Psoriasis, s/p right AKA, MRSA bacteremia, left ankle surgery with fixation hardware and hypothyroid and admission last month for recurrent cellulitis complicated by acute over chronic CHF and hypoglycemia now presents with worsening shortness of breath over the past week (pt unable to weight himself due to right AKA). Previous notes reviewed by me.    1. Acute over chronic HFrEF (severely reduced EF on JOHN PAUL in 2022), s/p AICD  EKG reviewed and interpreted by me: ventricular pacing  telemetry reviewed  trop 0.03 (lower than in January 2023)  pro BNP 9K  continue lasix 40mg IV q12hr   monitor weights in bed  I's and O's, fluid restriction, low sodium diet  continue metoprolol and spironolactone  allergy to ACE and Entresto  telemetry  s/p AICD interrogation on 2/24: 7 episodes on NSVT (self limiting) - outpt f/u with EP recommended    2. CAD s/p MI, PCI, CABG  on ASA/prasugrel, metoprolol    3. DM type 1  episodes of hypoglycemia  insulin was adjusted yesterday and FS are improved and stable  continue current lantus and lispro dosages and monitor closely  A1C 9.2    4. Multiple skin lesions of wrist and hands  h/o psoriasis  seen by derm as outpt and given dx of contact dermatitis - on steroid cream without much improvement  rule out inflammatory arthritis - ? gout on xray of hands  ESR 15, CRP 32, blood cultures negative, procal 0.09  pt afebrile and WBC WNL  arterial duplex of UE: normal blood flow  f/u pending rheum w/u including OBED, Aldolase, anti-scl, anti-sjogren , ANCA, RF and antiCCP  derm and rheum evals pending    5. PVD s/p intervention  left LE with chronic skin changes and cool to touch  wound care  arterial duplex of Left LE: normal flow    6. Hypothyroid on synthroid    7. DVT prophylaxis      full code  very guarded prognosis  high risk pt  DVT/GI ppx  guarded prognosis    Progress Note Handoff  Pending (specify): improvement in volume status, labs in AM, rheum and derm consults    pt informed of the plan of care   case discussed with wife and daughter this afternoon    Disposition: from home

## 2023-02-25 NOTE — DIETITIAN INITIAL EVALUATION ADULT - PERTINENT MEDS FT
MEDICATIONS  (STANDING):  aspirin enteric coated 81 milliGRAM(s) Oral daily  bacitracin   Ointment 1 Application(s) Topical two times a day  clobetasol 0.05% Cream 1 Application(s) Topical two times a day  dextrose 5%. 1000 milliLiter(s) (50 mL/Hr) IV Continuous <Continuous>  dextrose 5%. 1000 milliLiter(s) (100 mL/Hr) IV Continuous <Continuous>  dextrose 50% Injectable 25 Gram(s) IV Push once  dextrose 50% Injectable 12.5 Gram(s) IV Push once  dextrose 50% Injectable 25 Gram(s) IV Push once  diazepam    Tablet 1 milliGRAM(s) Oral at bedtime  digoxin     Tablet 125 MICROGram(s) Oral daily  DULoxetine 30 milliGRAM(s) Oral <User Schedule>  enoxaparin Injectable 30 milliGRAM(s) SubCutaneous every 24 hours  furosemide   Injectable 40 milliGRAM(s) IV Push two times a day  glucagon  Injectable 1 milliGRAM(s) IntraMuscular once  insulin glargine Injectable (LANTUS) 16 Unit(s) SubCutaneous every morning  insulin glargine Injectable (LANTUS) 10 Unit(s) SubCutaneous at bedtime  insulin lispro (ADMELOG) corrective regimen sliding scale   SubCutaneous three times a day before meals  levothyroxine 50 MICROGram(s) Oral <User Schedule>  levothyroxine 25 MICROGram(s) Oral <User Schedule>  metoprolol succinate ER 25 milliGRAM(s) Oral daily  multivitamin 1 Tablet(s) Oral daily  pantoprazole    Tablet 40 milliGRAM(s) Oral before breakfast  prasugrel 10 milliGRAM(s) Oral daily  spironolactone 25 milliGRAM(s) Oral daily    MEDICATIONS  (PRN):  acetaminophen     Tablet .. 650 milliGRAM(s) Oral every 6 hours PRN Temp greater or equal to 38C (100.4F), Mild Pain (1 - 3)  aluminum hydroxide/magnesium hydroxide/simethicone Suspension 30 milliLiter(s) Oral every 4 hours PRN Dyspepsia  dextrose Oral Gel 15 Gram(s) Oral once PRN Blood Glucose LESS THAN 70 milliGRAM(s)/deciliter  melatonin 3 milliGRAM(s) Oral at bedtime PRN Insomnia  ondansetron Injectable 4 milliGRAM(s) IV Push every 8 hours PRN Nausea and/or Vomiting  oxyCODONE    IR 10 milliGRAM(s) Oral every 6 hours PRN Moderate Pain (4 - 6)

## 2023-02-25 NOTE — DIETITIAN INITIAL EVALUATION ADULT - OTHER CALCULATIONS
Estimated Calorie Needs: [id number] x AF 1.3-1.5=[id number]/day -Due to PCM  Estimated Protein Needs: 87-107grams/day (1.3-1.6grams/kg of admit weight) -Due to PCM  Estimated Fluid Needs: 1334-1668mL/day (20-25mL/kg of admit weight) -Due to CHF

## 2023-02-26 NOTE — PROGRESS NOTE ADULT - ASSESSMENT
68 y/o man with PMH of HFrEF 15-20% s/p ICD, decreased RV function, mild MR/TR, DM type I w/ neuropathy and hx hypoglycemia (3 episodes in past 2 weeks), DL, HTN, CAD s/p MI, s/p CABG, s/p stent (2018), hx in-stent thrombosis while on Plavix, PAD s/p 3 LLE stents (2 months prior to admission), TIAGO (needs CPAP auto-regulating pressure 10-16, patient doesn't use it due to claustrophobia), Psoriasis, s/p right AKA, MRSA bacteremia, left ankle surgery with fixation hardware and hypothyroid and admission last month for recurrent cellulitis complicated by acute over chronic CHF and hypoglycemia now presents with worsening shortness of breath over the past week (pt unable to weight himself due to right AKA). Previous notes reviewed by me.    1. Acute over chronic HFrEF (severely reduced EF on JOHN PAUL in 2022), s/p AICD  EKG reviewed and interpreted by me: ventricular pacing  telemetry reviewed  trop 0.03 (lower than in January 2023)  pro BNP 9K  on lasix 40mg IV q12hr and monitor renal function closely  monitor weights in bed  I's and O's, fluid restriction, low sodium diet  continue metoprolol and spironolactone  allergy to ACE and Entresto  telemetry  s/p AICD interrogation on 2/24: 7 episodes on NSVT (self limiting) - outpt f/u with EP recommended    2. CAD s/p MI, PCI, CABG  on ASA/prasugrel, metoprolol    3. DM type 1  episodes of hypoglycemia  insulin was adjusted on 2/24 and FS are improved and stable  continue current lantus and lispro dosages and monitor closely (pt only taking lantus at night and FS are acceptable)  A1C 9.2    4. Multiple skin lesions of wrist and hands  h/o psoriasis  seen by derm as outpt and given dx of contact dermatitis - on steroid cream without much improvement  rule out inflammatory arthritis - ? gout on xray of hands  ESR 15, CRP 32, blood cultures negative, procal 0.09  pt afebrile and WBC WNL  arterial duplex of UE: normal blood flow  f/u pending rheum w/u including OBED, Aldolase, anti-scl, anti-sjogren , ANCA, RF and antiCCP  derm and rheum evals pending    5. PVD s/p intervention  left LE with chronic skin changes and cool to touch  wound care  arterial duplex of Left LE: normal flow    6. Hypothyroid on synthroid    7. Hyperkalemia - repeat K level today and if >5.5, give lokelma 10mg PO x 1   hold spironolactone and reassess  BMP in AM    8. DVT prophylaxis      full code  very guarded prognosis  high risk pt      Progress Note Handoff  Pending (specify): improvement in volume status, K level today, labs in AM, rheum and derm consults    pt informed of the plan of care   case discussed with wife and daughter yesterday    Disposition: from home

## 2023-02-27 NOTE — CONSULT NOTE ADULT - ASSESSMENT
67-year-old male w/ HFrEF 15-20% s/p ICD, decreased RV function, mild MR/TR, DM type I w/ neuropathy and hx hypoglycemia (3 episodes in past 2 weeks), DL, HTN, CAD, hx MI, s/p CABG, s/p stent (2018), hx in-stent thrombosis while on Plavix, PAD s/p 3 LLE stents (2 months prior to admission), TIAGO (needs CPAP auto-regulating pressure 10-16, patient doesn't use it due to claustrophobia), Psoriasis presenting w/ worsening shortness of breath over the past week. we are consulted for joint pain and rash    # Joint pain and rash - r/o psoriatic arthritis vs gout vs RA  - Patient with hx of psoriasis recently diagnosed ( no biopsy per patient)  - on clobetasol bid and triamcinolone cream with significant improvement in hand rash since few weeks  - mornign stiffness present for 1 hr  - joint pain mainly hand joints  - ESR 15, CRP 32, RNP and SC1 negative  - Xray hand --> right middle finger erosions proximal phalanx and left ulner styloid    Plan  - continue clobetasol cream bid and triamcinolone  - if joint pain worsens --> start prednisone 15 mg qd  - check OBED, aldolase, RF, anti CCP  - outpatient Rheum f/u    Plan discussed with attending   67-year-old male w/ HFrEF 15-20% s/p ICD, decreased RV function, mild MR/TR, DM type I w/ neuropathy and hx hypoglycemia (3 episodes in past 2 weeks), DL, HTN, CAD, hx MI, s/p CABG, s/p stent (2018), hx in-stent thrombosis while on Plavix, PAD s/p 3 LLE stents (2 months prior to admission), TIAGO (needs CPAP auto-regulating pressure 10-16, patient doesn't use it due to claustrophobia), Psoriasis presenting w/ worsening shortness of breath over the past week. we are consulted for joint pain and rash    # Joint pain and rash - r/o psoriatic arthritis vs gout vs RA  - Patient with hx of psoriasis recently diagnosed ( no biopsy per patient)  - on clobetasol bid and triamcinolone cream with significant improvement in hand rash since few weeks  - mornign stiffness present for 1 hr  - joint pain mainly hand joints  - ESR 15, CRP 32, RNP and SC1 negative  - Xray hand --> right middle finger erosions proximal phalanx and left ulner styloid    Plan  - continue clobetasol cream bid and triamcinolone  - if joint pain worsens --> start prednisone 15 mg qd  - check OBED, aldolase, RF, anti CCP, HLA B27  - outpatient Rheum f/u    Plan discussed with attending

## 2023-02-27 NOTE — CONSULT NOTE ADULT - ATTENDING COMMENTS
67 year old male with multiple medical problems including a history of CAD s/p CABG, PCI, HFrEF 10-15%, DM with neuropathy, RLE AKA, PAD, who presented for exacerbation of CHF. Rheumatology is asked to see patient for rash and joint pains. He reports developing a rash on his hands, wrists, left thigh 4 weeks ago he went to see dermatology who prescribed him clobetasol and triamcinolone that improved his rash. He reports developing significant pain in his hands in his PIPs that started a few weeks ago with morning stiffness that's prolonged and no swelling. He has trouble holding onto objects. He reports low back pain when he gets up in the morning that improves when he is active and moves around. Denies dactylitis, enthesitis, uveitis, inflammatory bowel disease, mucositis, photosensitivity, raynauds, dysphagia +sicca symptoms. Exam is with bilateral bony PIPs, left thumb is enlarged with nodule over IP joint, ulcerative lesions overlying PIPs. Flat erythematous rash over dorsum of hands and wrists. Left lateral mid thigh 2 raised white patches. Hand x-ray suggested right 3rd PIP erosion appears gouty, and left ulnar erosion. SSA, SSB, Scleroderma, RNP negative. OBED, PM/Scl pending.    Skin rash, possible psoriasis, and hand pains  -Differential includes rheumatoid arthritis, psoriatic arthritis, gouty arthritis, connective tissue disease related, infectious   -Check RF, CCP, HLA B27, aldolase  -Follow up OBED, PM/Scl. If OBED is + check dsDNA, Smith, C3, C4  -Start prednisone 15 mg daily if pain worsens

## 2023-02-27 NOTE — CONSULT NOTE ADULT - SUBJECTIVE AND OBJECTIVE BOX
Rheumatology CONSULT     Patient is a 67y old  Male who presents with a chief complaint of CHF (27 Feb 2023 12:51)      HPI:  67-year-old male w/ HFrEF 15-20% s/p ICD, decreased RV function, mild MR/TR, DM type I w/ neuropathy and hx hypoglycemia (3 episodes in past 2 weeks), DL, HTN, CAD, hx MI, s/p CABG, s/p stent (2018), hx in-stent thrombosis while on Plavix, PAD s/p 3 LLE stents (2 months prior to admission), TIAGO (needs CPAP auto-regulating pressure 10-16, patient doesn't use it due to claustrophobia), Psoriasis presenting w/ worsening shortness of breath over the past week, increased Lasix at home today. Can't measure weight at home due to right AKA. Denies fever, chills, N/V, cough, sputum, sick contacts, CP. Complains of b/l hand excruciating pain w/o benefit of topical steroids. Patient saw outpatient dermatologist who wasn't sure about Dx but suspected contact dermatitis and prescribed the steroid cream (Triamcinolone). Patient has erythema, tenderness, swelling. Symptoms are causing distress as the patient lost his dexterity because of them.    PMHx PVCs, Osteopenia, right AKA, hx septic arthritis, hx MRSA bacteremia, left ankle Sx, hypothyroidism, MDD/Anxiety, RLS, hx cholecystits s/p cholecystostomy (drain removed, fistula still in place)esophagitis, diverticulosis w/ chung diverticulitis (23 Feb 2023 02:03)       ROS:  Negative except for:    PAST MEDICAL & SURGICAL HISTORY:  Status post percutaneous transluminal coronary angioplasty  2 stents      Atherosclerosis of coronary artery  CAD (coronary artery disease)      Osteoarthritis      HLD (hyperlipidemia)      Diabetes  on insulin pump      CHF (congestive heart failure)  EF ~ 25%      History of celiac disease      Diverticulitis      STEMI (ST elevation myocardial infarction)      Diabetic neuropathy  Hands and Feet      Anxiety and depression      Other postprocedural status  Fixation hardware in foot LEFT      Stented coronary artery  10/18 heart attack  INFERIOR WALL MI      S/P CABG x 1  2018      S/P TKR (total knee replacement), right  with infection Mrsa   per pt he was cleared from MRSA infection      Surgery, elective  right knee wound debridement      History of open reduction and internal fixation (ORIF) procedure  right hip      H/O shoulder surgery  right      AICD (automatic cardioverter/defibrillator) present  [facility]      Cholecystostomy care  drain inserted 2020 &amp; removed 4 months ago      History of tonsillectomy      History of hip replacement, total, right      Elective surgery  plastic surgery Left shin          SOCIAL HISTORY:    FAMILY HISTORY:  Family history of heart disease (Mother)    Family history of allergies  daughter        MEDICATIONS  (STANDING):  aspirin enteric coated 81 milliGRAM(s) Oral daily  bacitracin   Ointment 1 Application(s) Topical two times a day  clobetasol 0.05% Cream 1 Application(s) Topical two times a day  dextrose 5%. 1000 milliLiter(s) (50 mL/Hr) IV Continuous <Continuous>  dextrose 5%. 1000 milliLiter(s) (100 mL/Hr) IV Continuous <Continuous>  dextrose 50% Injectable 25 Gram(s) IV Push once  dextrose 50% Injectable 12.5 Gram(s) IV Push once  dextrose 50% Injectable 25 Gram(s) IV Push once  diazepam    Tablet 1 milliGRAM(s) Oral at bedtime  digoxin     Tablet 125 MICROGram(s) Oral daily  DULoxetine 30 milliGRAM(s) Oral <User Schedule>  enoxaparin Injectable 30 milliGRAM(s) SubCutaneous every 24 hours  glucagon  Injectable 1 milliGRAM(s) IntraMuscular once  insulin glargine Injectable (LANTUS) 5 Unit(s) SubCutaneous at bedtime  insulin lispro (ADMELOG) corrective regimen sliding scale   SubCutaneous three times a day before meals  levothyroxine 50 MICROGram(s) Oral <User Schedule>  levothyroxine 25 MICROGram(s) Oral <User Schedule>  metoprolol succinate ER 25 milliGRAM(s) Oral daily  multivitamin 1 Tablet(s) Oral daily  pantoprazole    Tablet 40 milliGRAM(s) Oral before breakfast  prasugrel 10 milliGRAM(s) Oral daily  torsemide 40 milliGRAM(s) Oral two times a day    MEDICATIONS  (PRN):  acetaminophen     Tablet .. 650 milliGRAM(s) Oral every 6 hours PRN Temp greater or equal to 38C (100.4F), Mild Pain (1 - 3)  aluminum hydroxide/magnesium hydroxide/simethicone Suspension 30 milliLiter(s) Oral every 4 hours PRN Dyspepsia  dextrose Oral Gel 15 Gram(s) Oral once PRN Blood Glucose LESS THAN 70 milliGRAM(s)/deciliter  melatonin 3 milliGRAM(s) Oral at bedtime PRN Insomnia  ondansetron Injectable 4 milliGRAM(s) IV Push every 8 hours PRN Nausea and/or Vomiting  oxyCODONE    IR 10 milliGRAM(s) Oral every 6 hours PRN Moderate Pain (4 - 6)      Allergies    ACE inhibitors (Rash)  Atorvastatin Calcium (Unknown)  Bactrim (Unknown)  Entresto (Swelling)    Intolerances        Vital Signs Last 24 Hrs  T(C): 35.6 (27 Feb 2023 05:09), Max: 35.6 (26 Feb 2023 20:43)  T(F): 96 (27 Feb 2023 05:09), Max: 96 (26 Feb 2023 20:43)  HR: 74 (27 Feb 2023 05:09) (74 - 77)  BP: 113/69 (27 Feb 2023 05:09) (113/69 - 116/70)  BP(mean): --  RR: 18 (27 Feb 2023 05:09) (18 - 18)  SpO2: --        PHYSICAL EXAM  General: adult in NAD  HEENT: clear oropharynx, anicteric sclera, pink conjunctiva  Neck: supple  CV: normal S1/S2 with no murmur rubs or gallops  Lungs: positive air movement b/l ant lungs,clear to auscultation, no wheezes, no rales  Abdomen: soft non-tender non-distended, no hepatosplenomegaly  Ext: no clubbing cyanosis or edema  Skin: no rashes and no petechiae  Neuro: alert and oriented X 4, no focal deficits      02-26-23 @ 07:01  -  02-27-23 @ 07:00  --------------------------------------------------------  IN: 0 mL / OUT: 100 mL / NET: -100 mL      LABS:                          11.5   6.27  )-----------( 350      ( 26 Feb 2023 06:29 )             39.7         Mean Cell Volume : 69.8 fL  Mean Cell Hemoglobin : 20.2 pg  Mean Cell Hemoglobin Concentration : 29.0 g/dL  Auto Neutrophil # : x  Auto Lymphocyte # : x  Auto Monocyte # : x  Auto Eosinophil # : x  Auto Basophil # : x  Auto Neutrophil % : x  Auto Lymphocyte % : x  Auto Monocyte % : x  Auto Eosinophil % : x  Auto Basophil % : x      02-27    136  |  95<L>  |  53<H>  ----------------------------<  74  5.5<H>   |  31  |  1.6<H>    Ca    9.4      27 Feb 2023 05:07  Mg     1.9     02-27    TPro  6.9  /  Alb  3.4<L>  /  TBili  1.0  /  DBili  x   /  AST  43<H>  /  ALT  33  /  AlkPhos  194<H>  02-26                      BLOOD SMEAR INTERPRETATION:       RADIOLOGY & ADDITIONAL STUDIES:

## 2023-02-27 NOTE — PROGRESS NOTE ADULT - ASSESSMENT
68 y/o man with PMH of HFrEF 15-20% s/p ICD, decreased RV function, mild MR/TR, DM type I w/ neuropathy and hx hypoglycemia (3 episodes in past 2 weeks), DL, HTN, CAD s/p MI, s/p CABG, s/p stent (2018), hx in-stent thrombosis while on Plavix, PAD s/p 3 LLE stents (2 months prior to admission), TIAGO (needs CPAP auto-regulating pressure 10-16, patient doesn't use it due to claustrophobia), Psoriasis, s/p right AKA, MRSA bacteremia, left ankle surgery with fixation hardware and hypothyroid and admission last month for recurrent cellulitis complicated by acute over chronic CHF and hypoglycemia now presents with worsening shortness of breath over the past week (pt unable to weight himself due to right AKA). Previous notes reviewed by me.    1. Acute over chronic HFrEF (severely reduced EF on JOHN PAUL in 2022), s/p AICD  EKG reviewed and interpreted by me: ventricular pacing  telemetry reviewed  trop 0.03 (lower than in January 2023)  pro BNP 9K  on lasix 40mg IV q12hr without improvement  now on torsemide 40mg bid  monitor weights in bed  I's and O's, fluid restriction, low sodium diet  continue metoprolol   allergy to ACE and Entresto  telemetry  s/p AICD interrogation on 2/24: 7 episodes on NSVT (self limiting) - outpt f/u with EP recommended  check lactate and LFTs  cardiology following - Dr. Nelson    2. CAD s/p MI, PCI, CABG  on ASA/prasugrel, metoprolol    3. DM type 1  episodes of hypoglycemia  FS 52 this morning -> lantus dose reduced and monitor FS  A1C 9.2    4. Multiple skin lesions of wrist and hands  h/o psoriasis  seen by derm as outpt and given dx of contact dermatitis - on steroid cream without much improvement  rule out inflammatory arthritis - ? gout on xray of hands  ESR 15, CRP 32, blood cultures negative, procal 0.09  pt afebrile and WBC WNL  arterial duplex of UE: normal blood flow  anti SSA, SSB, Scl and RNP negative  f/u pending rheum w/u including OBED, Aldolase, ANCA, RF and antiCCP  derm and rheum evals pending    5. PVD s/p intervention  left LE with chronic skin changes and cool to touch  wound care  arterial duplex of Left LE: normal flow  evaluated by podiatry    6. Hypothyroid on synthroid    7. Hyperkalemia - now on torsemide - repeat K level today and if >5.5, give lokelma 10mg PO x 1   holding spironolactone   BMP at 4PM and in AM    8. MOISES - likely CRS  diuresis now with torsemide  monitor urine output  avoid nephrotoxic meds    9. DVT prophylaxis - on lovenox - change to heparin SQ if MOISES not improving      full code  very guarded prognosis  high risk pt      Progress Note Handoff  Pending (specify): improvement in volume status and renal function, K level today, labs in AM, rheum and derm consults, CXR in AM    pt informed of the plan of care     Disposition: from home

## 2023-02-28 NOTE — PHYSICAL THERAPY INITIAL EVALUATION ADULT - GENERAL OBSERVATIONS, REHAB EVAL
[id number] Pt received semifowlers in bed, family at bedside. Pt deferring PT eval at this time, able to provide prior level of function (see below). Pt educated on importance of OOB mobility, role of PT, POC and d/c planning. PT to f/u at next available session.
9:10-9:50 40 min pt rec in bed in NAD, pt agreeable to PT with encouragement, pt c/o 9/10 pain B hands and L JUSTINE RN Karoline informed

## 2023-02-28 NOTE — PROGRESS NOTE ADULT - ASSESSMENT
Consulted for bilateral lower legs, pt has venous stasis, no open wounds/ulcers, washed legs with warm water and soap, applied moisturizing lotion and 2 ace wraps from toes to knees, pt up in chair with legs elevated. Orders placed for daily washing and lotion application with compression. 68 y/o man with PMH of HFrEF 15-20% s/p ICD, decreased RV function, mild MR/TR, DM type I w/ neuropathy and hx hypoglycemia (3 episodes in past 2 weeks), DL, HTN, CAD s/p MI, s/p CABG, s/p stent (2018), hx in-stent thrombosis while on Plavix, PAD s/p 3 LLE stents (2 months prior to admission), TIAGO (needs CPAP auto-regulating pressure 10-16, patient doesn't use it due to claustrophobia), Psoriasis, s/p right AKA, MRSA bacteremia, left ankle surgery with fixation hardware and hypothyroid and admission last month for recurrent cellulitis complicated by acute over chronic CHF and hypoglycemia now presents with worsening shortness of breath over the past week (pt unable to weight himself due to right AKA). Previous notes reviewed by me.    #Acute over chronic HFrEF (severely reduced EF on JOHN PAUL in 2022), s/p AICD  -EKG: ventricular pacing  -pro BNP 9K  -on lasix 40mg IV q12hr without improvement; now on torsemide 40mg bid with good urine out pt.  -monitor weights in bed  -I's and O's, fluid restriction, low sodium diet  -continue metoprolol   -allergy to ACE and Entresto  -telemetry  -s/p AICD interrogation on 2/24: 7 episodes on NSVT (self limiting) - outpt f/u with EP recommended  -check lactate and LFTs  -cardiology following - Dr. Nelson  - Digoxin level elevated (2.2); Digoxin hel for now, will repeat level in 2 days    #CAD s/p MI, PCI, CABG  -on ASA/prasugrel, metoprolol    #DM type 1  -episodes of hypoglycemia  - lantus dose reduced and monitor FS  - A1C 9.2    #Multiple skin lesions of wrist and hands  -h/o psoriasis  -seen by derm as outpt and given dx of contact dermatitis - on steroid cream without much improvement  -rule out inflammatory arthritis - ? gout on xray of hands  -ESR 15, CRP 32, blood cultures negative, procal 0.09  -pt afebrile and WBC WNL  -arterial duplex of UE: normal blood flow  -anti SSA, SSB, Scl and RNP negative  -f/u pending rheum w/u including OBED, Aldolase, ANCA, RF and antiCCP    #PVD s/p intervention  -left LE with chronic skin changes and cool to touch  -wound care  -arterial duplex of Left LE: normal flow  -evaluated by podiatry    #Hypothyroid   - c/w synthroid    #Hyperkalemia  - now on torsemide - repeat K level today and if >5.5, give lokelma 10mg PO x 1   - holding spironolactone     # MOISES - likely CRS  -diuresis now with torsemide  -monitor urine output  -avoid nephrotoxic meds    #DVT prophylaxis   - on lovenox - change to heparin SQ if MOISES not improving    full code  Pending: improvement in volume status and renal function, CXR in AM  Disposition: from home

## 2023-02-28 NOTE — PROGRESS NOTE ADULT - ASSESSMENT
66 y/o man with PMH of HFrEF 15-20% s/p ICD, decreased RV function, mild MR/TR, DM type I w/ neuropathy and hx hypoglycemia (3 episodes in past 2 weeks), DL, HTN, CAD s/p MI, s/p CABG, s/p stent (2018), hx in-stent thrombosis while on Plavix, PAD s/p 3 LLE stents (2 months prior to admission), TIAGO (needs CPAP auto-regulating pressure 10-16, patient doesn't use it due to claustrophobia), Psoriasis, s/p right AKA, MRSA bacteremia, left ankle surgery with fixation hardware and hypothyroid and admission last month for recurrent cellulitis complicated by acute over chronic CHF and hypoglycemia now presents with worsening shortness of breath over the past week (pt unable to weight himself due to right AKA). Previous notes reviewed by me.    1. Acute over chronic HFrEF (severely reduced EF on JOHN PAUL in 2022), s/p AICD  EKG reviewed and interpreted by me: ventricular pacing  telemetry reviewed and continue for now  trop 0.03 (lower than in January 2023)  pro BNP 9K  was lasix 40mg IV q12hr without improvement  now on torsemide 40mg bid and in negative balance  repeat CXR in AM  monitor weights in bed  I's and O's, fluid restriction, low sodium diet  continue metoprolol   allergy to ACE and Entresto  spironolactone stopped due to hyperkalemia  s/p AICD interrogation on 2/24: 7 episodes on NSVT (self limiting) - outpt f/u with EP recommended  lactate 1.2, check LFTs  cardiology following - Dr. Nelson and case discussed with him today - continue current management    2. CAD s/p MI, PCI, CABG  on ASA/prasugrel, metoprolol    3. DM type 1  episodes of hypoglycemia  FS 68 this morning ->pt encouraged to eat bedtime snack - reduce lantus if needed  A1C 9.2    4. Multiple skin lesions of wrist and hands  h/o psoriasis  seen by derm as outpt and given dx of contact dermatitis - on steroid cream without much improvement  rule out inflammatory arthritis - ? gout on xray of hands  ESR 15, CRP 32, blood cultures negative, procal 0.09  pt afebrile and WBC WNL  arterial duplex of UE: normal blood flow  anti SSA, SSB, Scl and RNP negative, aldolase 7.4 (WNL)  OBED 1:320 speckled pattern -> check dsDNA, Smith, C3, C4  f/u rheum w/u including ANCA, RF, antiCCP, HLA B27  Rheumatology eval appreciated: diff dx: psoriatic, gout, RA - if pain persists, trial of prednisone 15mg daily    5. PVD s/p intervention  left LE with chronic skin changes and cool to touch  wound care  arterial duplex of Left LE: normal flow  evaluated by podiatry    6. Hypothyroid on synthroid    7. Hyperkalemia - now on torsemide - check K level today and if >5.5, give lokelma 10mg PO x 1   holding spironolactone   BMP at 4PM and in AM    8. MOISES - likely CRS  diuresis now with torsemide  monitor urine output  avoid nephrotoxic meds    9. DVT prophylaxis - on lovenox - change to heparin SQ if MOISES not improving      full code  very guarded prognosis  high risk pt      Progress Note Handoff  Pending (specify): improvement in volume status and renal function, labs today and in AM, CXR in AM, monitor FS    pt informed of the plan of care     Disposition: from home

## 2023-02-28 NOTE — PHYSICAL THERAPY INITIAL EVALUATION ADULT - ADDITIONAL COMMENTS
Pt non ambulatory, independent in transfers bed <> wheelchair, requires assistance for navigating w/c. Pt lives in house with wife, ramp entrance, 1 flight of stairs inside with chair lift however due to construction issues, pt remains on 1st floor. DME includes w/c, hospital bed.
pt lives with his wife in a PH, 6 [address]RA, +ramp, pt stated he needs assist going up/down the ramp in the w/c, 1 flight inside, pt has chair lift with 2 chairs, he stated he does not feel comfortable transferring to the 2nd chair and stays on the 1st floor

## 2023-03-01 NOTE — DISCHARGE NOTE PROVIDER - CARE PROVIDER_API CALL
Cedric HERBERT)  Cardiology; Internal Medicine  [facility] [place] [address]  Phone: [phone]  Fax: [phone]  Follow Up Time: 1 week   Cedric HERBERT)  Cardiology; Internal Medicine  [facility] [place] [address]  Phone: [phone]  Fax: [phone]  Follow Up Time: 1 week    Lilia Caldera)  Cardiovascular Disease; Internal Medicine  [address]  ,  [address]  Phone: [phone]  Fax: [phone]  Follow Up Time: 2 weeks   Cedric HERBERT)  Cardiology; Internal Medicine  [address] [place] [id number]  Phone: [phone]  Fax: [phone]  Follow Up Time: 1 week    Lilia Caldera)  Cardiovascular Disease; Internal Medicine  [address]  ,  [address]  Phone: [phone]  Fax: [phone]  Follow Up Time: 2 weeks    Brent Allen (DO)  Internal Medicine; Rheumatology  [facility] [place] [address]  Phone: [phone]  Fax: [phone]  Follow Up Time: 2 weeks

## 2023-03-01 NOTE — PROGRESS NOTE ADULT - PROVIDER SPECIALTY LIST ADULT
Cardiology
Cardiology
Hospitalist
Internal Medicine
Cardiology
Hospitalist
Hospitalist
Internal Medicine

## 2023-03-01 NOTE — DISCHARGE NOTE NURSING/CASE MANAGEMENT/SOCIAL WORK - NSDCPEFALRISK_GEN_ALL_CORE
For information on Fall & Injury Prevention, visit: [email or web address] OR  [email or web address] OR  https://www.cdc.gov/steadi/patient.html

## 2023-03-01 NOTE — DISCHARGE NOTE PROVIDER - NSDCFUSCHEDAPPT_GEN_ALL_CORE_FT
Kerry De La Cruz  [facility]  [facility] [address] [facility]  Scheduled Appointment: 04/10/2023

## 2023-03-01 NOTE — DISCHARGE NOTE PROVIDER - NSDCCPCAREPLAN_GEN_ALL_CORE_FT
PRINCIPAL DISCHARGE DIAGNOSIS  Diagnosis: Acute on chronic systolic congestive heart failure  Assessment and Plan of Treatment: Heart failure is a condition in which the heart is no longer able to pump oxygen-rich blood to the rest of the body efficiently. When symptoms become severe, a hospital stay may be necessary. This article discusses what you need to do to take care of yourself when you leave the hospital.  You were in the hospital to have your heart failure treated. Heart failure occurs when the muscles of your heart are weak or have trouble relaxing, or both.  Your heart is a pump that moves fluids through your body. As with any pump, if the flow out of the pump is not enough, fluids do not move well and they get stuck in places they should not be. In your body, this means that fluid collects in your lungs, abdomen, and legs.  Weigh yourself every morning on the same scale when you get up -- before you eat but after you use the bathroom. Make sure you are wearing similar clothing each time you weigh yourself. Write down your weight every day on a chart so that you can keep track of it.  Throughout the day, ask yourself:  Is my energy level normal?  Do I get more short of breath when I am doing my everyday activities?  Are my clothes or shoes feeling tight?  Are my ankles or legs swelling?  Am I coughing more often? Does my cough sound wet?  Do I get short of breath at night or when I lie down?  If you are having new (or different) symptoms, ask yourself:  Did I eat something different than usual or try a new food?  Did I take all of my medicines the right way at the right times?  Please take all of your medications as prescribed. Please follow uup with cardiologistDr. Cedric Nelson in 1-2 weeks after discharge.      SECONDARY DISCHARGE DIAGNOSES  Diagnosis: CAD (coronary artery disease)  Assessment and Plan of Treatment: Coronary artery disease (CAD) is narrowing of the arteries to your heart caused by a buildup of plaque. Plaque is made up of cholesterol and other substances. The narrowing in your arteries decreases the amount of blood that can flow to your heart. This causes your heart to get less oxygen.  Contact your healthcare provider if:  You have any of the following signs of a heart attack:  Squeezing, pressure, or pain in your chest  and any of the following:  Discomfort or pain in your back, neck, jaw, stomach, or arm  Shortness of breath  Nausea or vomiting  Lightheadedness or a sudden cold sweat  You have chest pain that is more frequent, or you have chest pain at rest.  You have questions or concerns about your condition or care.  Cholesterol medicines help lower blood cholesterol levels.  Nitrates , such as nitroglycerin, relax the arteries of your heart so it gets more oxygen. They help to relieve your chest pain.  Antiplatelets , such as aspirin, and plavix help prevent blood clots. Take your antiplatelet medicine exactly as directed. These medicines make it more likely for you to bleed or bruise. If you are told to take aspirin, do not take acetaminophen or ibuprofen instead.  Blood thinners help prevent blood clots. Examples of blood thinners include heparin and warfarin. Clots can cause strokes, heart attacks, and death. The following are general safety guidelines to follow while you are taking a blood thinner:  Watch for bleeding and bruising while you take blood thinners. Watch for bleeding from your gums or nose. Watch for blood in your urine and bowel movements. This can keep your skin and gums from bleeding. If you shave, use an electric shaver. Do not play contact sports.  Do not start or stop any medicines unless your healthcare provider tells you to.   Manage CAD:  Do not smoke. Nicotine and other chemicals in cigarettes and cigars can cause heart and lung damage.      Diagnosis: Diabetes mellitus  Assessment and Plan of Treatment: Diabetes is a chronic condition caused by high blood sugar levels. Your blood sugar levels become high because your body does not have enough insulin. Insulin helps move sugar out of the blood so it can be used for energy. Increased sugar in your blood can cause problems in several organs of your body including but not limited to your blood vessels, your kidneys, your brain, and your eyes. The lack of insulin forces your body to use fat instead of sugar for energy. This can be dangerous if not controlled.  Seek Medical Attention If:  You have a seizure.  You begin to breathe fast, or are short of breath.  You become weak and confused.  You are more drowsy than usual.  You have fruity, sweet breath.  You have severe, new stomach pain and are vomiting.  Your blood sugar level is lower or higher than your healthcare provider says it should be.  You have ketones in your blood or urine.  You have a fever or chills.  You are more thirsty than usual.  You are urinating more often than usual.  You have questions or concerns about your condition or care.  Insulin and diabetes medicine decreases the amount of sugar in your blood.  The best way to prevent problems from Diabetes is to control your blood sugars.  Monitor your blood sugar levels closely. Administer Insulin as directed by your physician.   Speak with your doctor and/or a nutritionist about the best way to change your lifestyle and dietary habits to avoid any problems from Diabetes in the future.      Diagnosis: Elevated liver enzymes  Assessment and Plan of Treatment: During your stay, it was noted that there was an increase in your liver enzymes. If you have high levels of liver enzymes in your blood, you have elevated liver enzymes. High liver enzyme levels may be temporary, or they may be a sign of a medical condition like hepatitis or liver disease. Certain medications can also cause elevated liver enzymes.  Liver diseases, medical conditions, medications and infections can cause elevated liver enzymes.  Common causes for elevated liver enzymes include:  Certain medications, such as cholesterol-lowering drugs (statins) and acetaminophen.  Fatty liver disease, including alcohol-related and non-alcohol-related conditions.  Hemochromatosis.  Hepatitis A, hepatitis B, hepatitis C, alcoholic hepatitis and autoimmune hepatitis.  Herbal supplements and vitamin supplements, like chaparral, comfrey tea, iron and vitamin A.  Other causes of elevated liver enzymes include:  Alpha-1 antitrypsin deficiency.  Cancer.  Celiac disease.  Cirrhosis of the liver.  Hemolysis.  Metabolic syndrome.  Muscle conditions, like polymyositis.  Thyroid disease.  Kyrie disease.  Primary sclerosing cholangitis.  Primary biliary cirrhosis.  What are the risk factors for elevated liver enzymes?  Factors that put you at risk for elevated liver enzymes include:  Alcohol use.  Certain medications, herbs and vitamin supplements.  Diabetes.  Family history of liver disease.  Hepatitis or exposure to hepatitis.  About one-third of people with elevated liver enzymes will have normal liver enzyme levels after two to four weeks. If your liver enzymes stay high, your provider may order more blood tests, or imaging tests such as ultrasound, CT scan or MRI. They may also refer you to a liver specialist (hepatologist). Treatment will depend on what’s causing the elevated liver enzymes.  Please follow up with your primary care doctor in 1 week to get bloodwork to monitor you liver enzymes.     PRINCIPAL DISCHARGE DIAGNOSIS  Diagnosis: Acute on chronic systolic congestive heart failure  Assessment and Plan of Treatment: Heart failure is a condition in which the heart is no longer able to pump oxygen-rich blood to the rest of the body efficiently. When symptoms become severe, a hospital stay may be necessary. This article discusses what you need to do to take care of yourself when you leave the hospital.  You were in the hospital to have your heart failure treated. Heart failure occurs when the muscles of your heart are weak or have trouble relaxing, or both.  Your heart is a pump that moves fluids through your body. As with any pump, if the flow out of the pump is not enough, fluids do not move well and they get stuck in places they should not be. In your body, this means that fluid collects in your lungs, abdomen, and legs.  Weigh yourself every morning on the same scale when you get up -- before you eat but after you use the bathroom. Make sure you are wearing similar clothing each time you weigh yourself. Write down your weight every day on a chart so that you can keep track of it.  Throughout the day, ask yourself:  Is my energy level normal?  Do I get more short of breath when I am doing my everyday activities?  Are my clothes or shoes feeling tight?  Are my ankles or legs swelling?  Am I coughing more often? Does my cough sound wet?  Do I get short of breath at night or when I lie down?  If you are having new (or different) symptoms, ask yourself:  Did I eat something different than usual or try a new food?  Did I take all of my medicines the right way at the right times?  Please take all of your medications as prescribed. Please follow uup with cardiologistDr. Cedric Nelson in 1-2 weeks after discharge.      SECONDARY DISCHARGE DIAGNOSES  Diagnosis: CAD (coronary artery disease)  Assessment and Plan of Treatment: Coronary artery disease (CAD) is narrowing of the arteries to your heart caused by a buildup of plaque. Plaque is made up of cholesterol and other substances. The narrowing in your arteries decreases the amount of blood that can flow to your heart. This causes your heart to get less oxygen.  Contact your healthcare provider if:  You have any of the following signs of a heart attack:  Squeezing, pressure, or pain in your chest  and any of the following:  Discomfort or pain in your back, neck, jaw, stomach, or arm  Shortness of breath  Nausea or vomiting  Lightheadedness or a sudden cold sweat  You have chest pain that is more frequent, or you have chest pain at rest.  You have questions or concerns about your condition or care.  Cholesterol medicines help lower blood cholesterol levels.  Nitrates , such as nitroglycerin, relax the arteries of your heart so it gets more oxygen. They help to relieve your chest pain.  Antiplatelets , such as aspirin, and plavix help prevent blood clots. Take your antiplatelet medicine exactly as directed. These medicines make it more likely for you to bleed or bruise. If you are told to take aspirin, do not take acetaminophen or ibuprofen instead.  Blood thinners help prevent blood clots. Examples of blood thinners include heparin and warfarin. Clots can cause strokes, heart attacks, and death. The following are general safety guidelines to follow while you are taking a blood thinner:  Watch for bleeding and bruising while you take blood thinners. Watch for bleeding from your gums or nose. Watch for blood in your urine and bowel movements. This can keep your skin and gums from bleeding. If you shave, use an electric shaver. Do not play contact sports.  Do not start or stop any medicines unless your healthcare provider tells you to.   Manage CAD:  Do not smoke. Nicotine and other chemicals in cigarettes and cigars can cause heart and lung damage.      Diagnosis: Diabetes mellitus  Assessment and Plan of Treatment: Diabetes is a chronic condition caused by high blood sugar levels. Your blood sugar levels become high because your body does not have enough insulin. Insulin helps move sugar out of the blood so it can be used for energy. Increased sugar in your blood can cause problems in several organs of your body including but not limited to your blood vessels, your kidneys, your brain, and your eyes. The lack of insulin forces your body to use fat instead of sugar for energy. This can be dangerous if not controlled.  Seek Medical Attention If:  You have a seizure.  You begin to breathe fast, or are short of breath.  You become weak and confused.  You are more drowsy than usual.  You have fruity, sweet breath.  You have severe, new stomach pain and are vomiting.  Your blood sugar level is lower or higher than your healthcare provider says it should be.  You have ketones in your blood or urine.  You have a fever or chills.  You are more thirsty than usual.  You are urinating more often than usual.  You have questions or concerns about your condition or care.  Insulin and diabetes medicine decreases the amount of sugar in your blood.  The best way to prevent problems from Diabetes is to control your blood sugars.  Monitor your blood sugar levels closely. Administer Insulin as directed by your physician.   Speak with your doctor and/or a nutritionist about the best way to change your lifestyle and dietary habits to avoid any problems from Diabetes in the future.      Diagnosis: Elevated liver enzymes  Assessment and Plan of Treatment: During your stay, it was noted that there was an increase in your liver enzymes. High liver enzyme levels may be temporary, or they may be a sign of a medical condition like hepatitis or liver disease. Certain medications can also cause elevated liver enzymes.  Liver diseases, medical conditions, medications and infections can cause elevated liver enzymes.  Common causes for elevated liver enzymes include:  Certain medications, such as cholesterol-lowering drugs (statins) and acetaminophen.  Fatty liver disease, including alcohol-related and non-alcohol-related conditions.  Hemochromatosis.  Hepatitis A, hepatitis B, hepatitis C, alcoholic hepatitis and autoimmune hepatitis.  Herbal supplements and vitamin supplements, like chaparral, comfrey tea, iron and vitamin A.  Other causes of elevated liver enzymes include:  Alpha-1 antitrypsin deficiency.  Cancer.  Celiac disease.  Cirrhosis of the liver.  Hemolysis.  Metabolic syndrome.  Muscle conditions, like polymyositis.  Thyroid disease.  Kyrie disease.  Primary sclerosing cholangitis.  Primary biliary cirrhosis.  What are the risk factors for elevated liver enzymes?  Factors that put you at risk for elevated liver enzymes include:  Alcohol use.  Certain medications, herbs and vitamin supplements.  Diabetes.  Family history of liver disease.  Hepatitis or exposure to hepatitis.  About one-third of people with elevated liver enzymes will have normal liver enzyme levels after two to four weeks. If your liver enzymes stay high, your provider may order more blood tests, or imaging tests such as ultrasound, CT scan or MRI. They may also refer you to a liver specialist (hepatologist). Treatment will depend on what’s causing the elevated liver enzymes.  You declined further work up while in the hospital and it is highly recommended that you pursue further work up once you leave the hospital.  Please follow up with your primary care doctor in 1 week to get bloodwork to monitor you liver enzymes.     PRINCIPAL DISCHARGE DIAGNOSIS  Diagnosis: Acute on chronic systolic congestive heart failure  Assessment and Plan of Treatment: Heart failure is a condition in which the heart is no longer able to pump oxygen-rich blood to the rest of the body efficiently. When symptoms become severe, a hospital stay may be necessary. This article discusses what you need to do to take care of yourself when you leave the hospital.  You were in the hospital to have your heart failure treated. Heart failure occurs when the muscles of your heart are weak or have trouble relaxing, or both.  Your heart is a pump that moves fluids through your body. As with any pump, if the flow out of the pump is not enough, fluids do not move well and they get stuck in places they should not be. In your body, this means that fluid collects in your lungs, abdomen, and legs.  Weigh yourself every morning on the same scale when you get up -- before you eat but after you use the bathroom. Make sure you are wearing similar clothing each time you weigh yourself. Write down your weight every day on a chart so that you can keep track of it.  Throughout the day, ask yourself:  Is my energy level normal?  Do I get more short of breath when I am doing my everyday activities?  Are my clothes or shoes feeling tight?  Are my ankles or legs swelling?  Am I coughing more often? Does my cough sound wet?  Do I get short of breath at night or when I lie down?  If you are having new (or different) symptoms, ask yourself:  Did I eat something different than usual or try a new food?  Did I take all of my medicines the right way at the right times?  Please take all of your medications as prescribed. Please follow uup with cardiologistDr. Cedric Nelson in 1-2 weeks after discharge.      SECONDARY DISCHARGE DIAGNOSES  Diagnosis: CAD (coronary artery disease)  Assessment and Plan of Treatment: Coronary artery disease (CAD) is narrowing of the arteries to your heart caused by a buildup of plaque. Plaque is made up of cholesterol and other substances. The narrowing in your arteries decreases the amount of blood that can flow to your heart. This causes your heart to get less oxygen.  Contact your healthcare provider if:  You have any of the following signs of a heart attack:  Squeezing, pressure, or pain in your chest  and any of the following:  Discomfort or pain in your back, neck, jaw, stomach, or arm  Shortness of breath  Nausea or vomiting  Lightheadedness or a sudden cold sweat  You have chest pain that is more frequent, or you have chest pain at rest.  You have questions or concerns about your condition or care.  Cholesterol medicines help lower blood cholesterol levels.  Nitrates , such as nitroglycerin, relax the arteries of your heart so it gets more oxygen. They help to relieve your chest pain.  Antiplatelets , such as aspirin, and plavix help prevent blood clots. Take your antiplatelet medicine exactly as directed. These medicines make it more likely for you to bleed or bruise. If you are told to take aspirin, do not take acetaminophen or ibuprofen instead.  Blood thinners help prevent blood clots. Examples of blood thinners include heparin and warfarin. Clots can cause strokes, heart attacks, and death. The following are general safety guidelines to follow while you are taking a blood thinner:  Watch for bleeding and bruising while you take blood thinners. Watch for bleeding from your gums or nose. Watch for blood in your urine and bowel movements. This can keep your skin and gums from bleeding. If you shave, use an electric shaver. Do not play contact sports.  Do not start or stop any medicines unless your healthcare provider tells you to.   Manage CAD:  Do not smoke. Nicotine and other chemicals in cigarettes and cigars can cause heart and lung damage.      Diagnosis: Diabetes mellitus  Assessment and Plan of Treatment: Diabetes is a chronic condition caused by high blood sugar levels. Your blood sugar levels become high because your body does not have enough insulin. Insulin helps move sugar out of the blood so it can be used for energy. Increased sugar in your blood can cause problems in several organs of your body including but not limited to your blood vessels, your kidneys, your brain, and your eyes. The lack of insulin forces your body to use fat instead of sugar for energy. This can be dangerous if not controlled.  Seek Medical Attention If:  You have a seizure.  You begin to breathe fast, or are short of breath.  You become weak and confused.  You are more drowsy than usual.  You have fruity, sweet breath.  You have severe, new stomach pain and are vomiting.  Your blood sugar level is lower or higher than your healthcare provider says it should be.  You have ketones in your blood or urine.  You have a fever or chills.  You are more thirsty than usual.  You are urinating more often than usual.  You have questions or concerns about your condition or care.  Insulin and diabetes medicine decreases the amount of sugar in your blood.  The best way to prevent problems from Diabetes is to control your blood sugars.  Monitor your blood sugar levels closely. Administer Insulin as directed by your physician.   Speak with your doctor and/or a nutritionist about the best way to change your lifestyle and dietary habits to avoid any problems from Diabetes in the future.      Diagnosis: Elevated liver enzymes  Assessment and Plan of Treatment: During your stay, it was noted that there was an increase in your liver enzymes. High liver enzyme levels may be temporary, or they may be a sign of a medical condition like hepatitis or liver disease. Certain medications can also cause elevated liver enzymes.  Liver diseases, medical conditions, medications and infections can cause elevated liver enzymes.  Common causes for elevated liver enzymes include:  Certain medications, such as cholesterol-lowering drugs (statins) and acetaminophen.  Fatty liver disease, including alcohol-related and non-alcohol-related conditions.  Hemochromatosis.  Hepatitis A, hepatitis B, hepatitis C, alcoholic hepatitis and autoimmune hepatitis.  Herbal supplements and vitamin supplements, like chaparral, comfrey tea, iron and vitamin A.  Other causes of elevated liver enzymes include:  Alpha-1 antitrypsin deficiency.  Cancer.  Celiac disease.  Cirrhosis of the liver.  Hemolysis.  Metabolic syndrome.  Muscle conditions, like polymyositis.  Thyroid disease.  Kyrie disease.  Primary sclerosing cholangitis.  Primary biliary cirrhosis.  What are the risk factors for elevated liver enzymes?  Factors that put you at risk for elevated liver enzymes include:  Alcohol use.  Certain medications, herbs and vitamin supplements.  Diabetes.  Family history of liver disease.  Hepatitis or exposure to hepatitis.  About one-third of people with elevated liver enzymes will have normal liver enzyme levels after two to four weeks. If your liver enzymes stay high, your provider may order more blood tests, or imaging tests such as ultrasound, CT scan or MRI. They may also refer you to a liver specialist (hepatologist). Treatment will depend on what’s causing the elevated liver enzymes.  You declined further work up while in the hospital and it is highly recommended that you pursue further work up once you leave the hospital.  Please follow up with your primary care doctor in 1 week to get bloodwork to monitor you liver enzymes.    Diagnosis: Rheumatoid disease  Assessment and Plan of Treatment: You were being worked up Rheumtaologic diseeases while you were hospitalized because of your joint pain and extensive rashes.  You refused several labs for work up. Please follow up with Rheumatoologist Dr. Allen for further work up.

## 2023-03-01 NOTE — DISCHARGE NOTE PROVIDER - PROVIDER TOKENS
PROVIDER:[TOKEN:[[id number]],FOLLOWUP:[1 week]] PROVIDER:[TOKEN:[[id number]],FOLLOWUP:[1 week]],PROVIDER:[TOKEN:[[id number]],FOLLOWUP:[2 weeks]] PROVIDER:[TOKEN:[[id number]],FOLLOWUP:[1 week]],PROVIDER:[TOKEN:[[id number]],FOLLOWUP:[2 weeks]],PROVIDER:[TOKEN:[[id number]],FOLLOWUP:[2 weeks]]

## 2023-03-01 NOTE — DISCHARGE NOTE PROVIDER - NSDCMRMEDTOKEN_GEN_ALL_CORE_FT
aspirin 81 mg oral delayed release tablet: 1 tab(s) orally once a day  diazePAM 2 mg oral tablet: 0.5 tab(s) orally once a day (at bedtime)  DULoxetine 30 mg oral delayed release capsule: 1 cap(s) orally 3 times a day  insulin glargine 100 units/mL subcutaneous solution: 25 unit(s) subcutaneous once a day (at bedtime)  insulin glargine 100 units/mL subcutaneous solution: 40 microcurie subcutaneous once a day  insulin lispro 100 units/mL injectable solution: if your finger stick is 150-199 please inject 2 units  if your finger stick is 200-250 please inject 4 units  if your finger stick is 251-300 please inject 6 units  if your finger stick is 301-350 please inject 8 units  if your finger stick is more than 350 please call your physician     levothyroxine 25 mcg (0.025 mg) oral tablet: 1 tab(s) orally 6 times a week  levothyroxine 50 mcg (0.05 mg) oral tablet: 1 tab(s) orally once a week  metoprolol succinate 25 mg oral tablet, extended release: 1 tab(s) orally once a day  Multiple Vitamins oral tablet: 1 tab(s) orally once a day  pantoprazole 40 mg oral delayed release tablet: 1 tab(s) orally once a day (before a meal)  prasugrel 10 mg oral tablet: 1 tab(s) orally once a day  rosuvastatin 40 mg oral tablet: 1 tab(s) orally once a day  spironolactone 25 mg oral tablet: 1 tab(s) orally once a day  torsemide 40 mg oral tablet: 1 tab(s) orally once a day   triamcinolone 0.1% topical cream: Apply topically to affected area 2 times a day   aspirin 81 mg oral delayed release tablet: 1 tab(s) orally once a day  diazePAM 2 mg oral tablet: 0.5 tab(s) orally once a day (at bedtime)  DULoxetine 30 mg oral delayed release capsule: 1 cap(s) orally 3 times a day  insulin glargine 100 units/mL subcutaneous solution: 25 unit(s) subcutaneous once a day (at bedtime)  insulin glargine 100 units/mL subcutaneous solution: 40 microcurie subcutaneous once a day  insulin lispro 100 units/mL injectable solution: if your finger stick is 150-199 please inject 2 units  if your finger stick is 200-250 please inject 4 units  if your finger stick is 251-300 please inject 6 units  if your finger stick is 301-350 please inject 8 units  if your finger stick is more than 350 please call your physician     levothyroxine 25 mcg (0.025 mg) oral tablet: 1 tab(s) orally 6 times a week  levothyroxine 50 mcg (0.05 mg) oral tablet: 1 tab(s) orally once a week  metoprolol succinate 25 mg oral tablet, extended release: 1 tab(s) orally once a day  Multiple Vitamins oral tablet: 1 tab(s) orally once a day  pantoprazole 40 mg oral delayed release tablet: 1 tab(s) orally once a day (before a meal)  prasugrel 10 mg oral tablet: 1 tab(s) orally once a day  rosuvastatin 40 mg oral tablet: 1 tab(s) orally once a day  Soaanz 20 mg oral tablet: Please take two 20 mg tablets once daily. (total of 40mg once daily).  spironolactone 25 mg oral tablet: 1 tab(s) orally once a day  triamcinolone 0.1% topical cream: Apply topically to affected area 2 times a day

## 2023-03-01 NOTE — PROGRESS NOTE ADULT - NUTRITIONAL ASSESSMENT
This patient has been assessed with a concern for Malnutrition and has been determined to have a diagnosis/diagnoses of Severe protein-calorie malnutrition.    This patient is being managed with:   Diet Regular-  Gluten-Gliadin Restricted  Entered: Feb 27 2023  7:39AM    The following pending diet order is being considered for treatment of Severe protein-calorie malnutrition:  Diet Consistent Carbohydrate w/Evening Snack-  High Fiber  1500mL Fluid Restriction ([id number])  Low Sodium  Supplement Feeding Modality:  Oral  Glucerna Shake Cans or Servings Per Day:  1       Frequency:  Three Times a day  Entered: Feb 25 2023 10:19PM    Diet Consistent Carbohydrate w/Evening Snack-  High Fiber  Low Sodium  Supplement Feeding Modality:  Oral  Ensure Plus High Protein Cans or Servings Per Day:  1       Frequency:  Three Times a day  Entered: Feb 25 2023 10:11PM  
This patient has been assessed with a concern for Malnutrition and has been determined to have a diagnosis/diagnoses of Severe protein-calorie malnutrition.    This patient is being managed with:   Diet Regular-  Gluten-Gliadin Restricted  Entered: Feb 27 2023  7:39AM    The following pending diet order is being considered for treatment of Severe protein-calorie malnutrition:  Diet Consistent Carbohydrate w/Evening Snack-  High Fiber  1500mL Fluid Restriction ([id number])  Low Sodium  Supplement Feeding Modality:  Oral  Glucerna Shake Cans or Servings Per Day:  1       Frequency:  Three Times a day  Entered: Feb 25 2023 10:19PM    Diet Consistent Carbohydrate w/Evening Snack-  High Fiber  Low Sodium  Supplement Feeding Modality:  Oral  Ensure Plus High Protein Cans or Servings Per Day:  1       Frequency:  Three Times a day  Entered: Feb 25 2023 10:11PM  
This patient has been assessed with a concern for Malnutrition and has been determined to have a diagnosis/diagnoses of Severe protein-calorie malnutrition.    This patient is being managed with:   Diet Regular-  Consistent Carbohydrate {No Snacks}  High Fiber  DASH/TLC {Sodium & Cholesterol Restricted}  1200mL Fluid Restriction ([id number])  Gluten-Gliadin Restricted  Entered: Feb 23 2023  8:43AM    The following pending diet order is being considered for treatment of Severe protein-calorie malnutrition:  Diet Consistent Carbohydrate w/Evening Snack-  High Fiber  1500mL Fluid Restriction ([id number])  Low Sodium  Supplement Feeding Modality:  Oral  Glucerna Shake Cans or Servings Per Day:  1       Frequency:  Three Times a day  Entered: Feb 25 2023 10:19PM    Diet Consistent Carbohydrate w/Evening Snack-  High Fiber  Low Sodium  Supplement Feeding Modality:  Oral  Ensure Plus High Protein Cans or Servings Per Day:  1       Frequency:  Three Times a day  Entered: Feb 25 2023 10:11PM  
This patient has been assessed with a concern for Malnutrition and has been determined to have a diagnosis/diagnoses of Severe protein-calorie malnutrition.    This patient is being managed with:   Diet Regular-  Gluten-Gliadin Restricted  Entered: Feb 27 2023  7:39AM    The following pending diet order is being considered for treatment of Severe protein-calorie malnutrition:  Diet Consistent Carbohydrate w/Evening Snack-  High Fiber  1500mL Fluid Restriction ([id number])  Low Sodium  Supplement Feeding Modality:  Oral  Glucerna Shake Cans or Servings Per Day:  1       Frequency:  Three Times a day  Entered: Feb 25 2023 10:19PM    Diet Consistent Carbohydrate w/Evening Snack-  High Fiber  Low Sodium  Supplement Feeding Modality:  Oral  Ensure Plus High Protein Cans or Servings Per Day:  1       Frequency:  Three Times a day  Entered: Feb 25 2023 10:11PM  
This patient has been assessed with a concern for Malnutrition and has been determined to have a diagnosis/diagnoses of Severe protein-calorie malnutrition.    This patient is being managed with:   Diet Regular-  Gluten-Gliadin Restricted  Entered: Feb 27 2023  7:39AM    The following pending diet order is being considered for treatment of Severe protein-calorie malnutrition:  Diet Consistent Carbohydrate w/Evening Snack-  High Fiber  1500mL Fluid Restriction ([id number])  Low Sodium  Supplement Feeding Modality:  Oral  Glucerna Shake Cans or Servings Per Day:  1       Frequency:  Three Times a day  Entered: Feb 25 2023 10:19PM    Diet Consistent Carbohydrate w/Evening Snack-  High Fiber  Low Sodium  Supplement Feeding Modality:  Oral  Ensure Plus High Protein Cans or Servings Per Day:  1       Frequency:  Three Times a day  Entered: Feb 25 2023 10:11PM

## 2023-03-01 NOTE — PROGRESS NOTE ADULT - SUBJECTIVE AND OBJECTIVE BOX
FLOWER MARISCAL  67y Male    INTERVAL HPI/OVERNIGHT EVENTS:    pt feels better today  less SOB  some pain of his hands  FS 68 today - encouraged pt to have bedtime snack - he's eating today    T(F): 96 (02-28-23 @ 12:42), Max: 97.9 (02-27-23 @ 19:59)  HR: 72 (02-28-23 @ 12:42) (72 - 75)  BP: 106/64 (02-28-23 @ 12:42) (106/64 - 118/66)  RR: 18 (02-28-23 @ 12:42) (18 - 18)  SpO2: --    I&O's Summary    27 Feb 2023 07:01  -  28 Feb 2023 07:00  --------------------------------------------------------  IN: 200 mL / OUT: 1600 mL / NET: -1400 mL    28 Feb 2023 07:01  -  28 Feb 2023 15:01  --------------------------------------------------------  IN: 570 mL / OUT: 950 mL / NET: -380 mL      CAPILLARY BLOOD GLUCOSE      POCT Blood Glucose.: 120 mg/dL (28 Feb 2023 11:29)  POCT Blood Glucose.: 68 mg/dL (28 Feb 2023 08:04)  POCT Blood Glucose.: 109 mg/dL (27 Feb 2023 21:36)  POCT Blood Glucose.: 86 mg/dL (27 Feb 2023 16:35)        PHYSICAL EXAM:  GENERAL: NAD  HEAD:  Normocephalic  EYES:  conjunctiva and sclera clear  ENMT: Moist mucous membranes  NECK: Supple  NERVOUS SYSTEM:  Alert, awake, Good concentration  CHEST/LUNG: decreased BS b/l  HEART: Regular rate and rhythm  ABDOMEN: Soft, Nontender, Nondistended; Bowel sounds present  EXTREMITIES: right AKA  left LE with dressings, mild edema  SKIN: ulcers of wrists and hands, deformity of MCPs, limited ROM    Consultant(s) Notes Reviewed:  [x ] YES  [ ] NO  Care Discussed with Consultants/Other Providers [ x] YES  [ ] NO    MEDICATIONS  (STANDING):  aspirin enteric coated 81 milliGRAM(s) Oral daily  bacitracin   Ointment 1 Application(s) Topical two times a day  clobetasol 0.05% Cream 1 Application(s) Topical two times a day  dextrose 5%. 1000 milliLiter(s) (50 mL/Hr) IV Continuous <Continuous>  dextrose 5%. 1000 milliLiter(s) (100 mL/Hr) IV Continuous <Continuous>  dextrose 50% Injectable 25 Gram(s) IV Push once  dextrose 50% Injectable 12.5 Gram(s) IV Push once  dextrose 50% Injectable 25 Gram(s) IV Push once  diazepam    Tablet 1 milliGRAM(s) Oral at bedtime  DULoxetine 30 milliGRAM(s) Oral <User Schedule>  enoxaparin Injectable 30 milliGRAM(s) SubCutaneous every 24 hours  glucagon  Injectable 1 milliGRAM(s) IntraMuscular once  insulin glargine Injectable (LANTUS) 5 Unit(s) SubCutaneous at bedtime  insulin lispro (ADMELOG) corrective regimen sliding scale   SubCutaneous three times a day before meals  levothyroxine 50 MICROGram(s) Oral <User Schedule>  levothyroxine 25 MICROGram(s) Oral <User Schedule>  metoprolol succinate ER 25 milliGRAM(s) Oral daily  multivitamin 1 Tablet(s) Oral daily  pantoprazole    Tablet 40 milliGRAM(s) Oral before breakfast  prasugrel 10 milliGRAM(s) Oral daily  torsemide 40 milliGRAM(s) Oral two times a day    MEDICATIONS  (PRN):  acetaminophen     Tablet .. 650 milliGRAM(s) Oral every 6 hours PRN Temp greater or equal to 38C (100.4F), Mild Pain (1 - 3)  aluminum hydroxide/magnesium hydroxide/simethicone Suspension 30 milliLiter(s) Oral every 4 hours PRN Dyspepsia  dextrose Oral Gel 15 Gram(s) Oral once PRN Blood Glucose LESS THAN 70 milliGRAM(s)/deciliter  melatonin 3 milliGRAM(s) Oral at bedtime PRN Insomnia  ondansetron Injectable 4 milliGRAM(s) IV Push every 8 hours PRN Nausea and/or Vomiting  oxyCODONE    IR 10 milliGRAM(s) Oral every 6 hours PRN Moderate Pain (4 - 6)      Telemetry reviewed by me    LABS:    02-27    136  |  95<L>  |  53<H>  ----------------------------<  74  5.5<H>   |  31  |  1.6<H>    Ca    9.4      27 Feb 2023 05:07  Mg     1.9     02-27                RADIOLOGY & ADDITIONAL TESTS:    Imaging and report Personally Reviewed:  [ x] YES  [ ] NO    < from: Xray Chest 1 View- PORTABLE-Routine (Xray Chest 1 View- PORTABLE-Routine in AM.) (02.28.23 @ 06:49) >  FINDINGS/  IMPRESSION:    Unchanged right pleural effusion and opacities. No pneumothorax.    Stable cardiomediastinal silhouette.    Unchanged osseous structures.      < end of copied text >      Case discussed with residents and RN on rounds today    Care discussed with pt        
  LOIDAFLOWER  67y Male    INTERVAL HPI/OVERNIGHT EVENTS:    pt feels better today - less SOB  no chest pain, N/V, abdominal pain  has ulcers of wrist and deformity of hands b/l - awaiting Derm and rheum evals  no fever    T(F): 97.5 (23 @ 05:10), Max: 98 (23 @ 15:29)  HR: 82 (23 @ 11:40) (78 - 86)  BP: 113/69 (23 @ 11:40) (92/81 - 118/74)  RR: 18 (23 @ 11:40) (18 - 18)  SpO2: 96% (23 @ 19:58) (94% - 96%) on RA    I&O's Summary    2023 07:01  -  2023 11:51  --------------------------------------------------------  IN: 240 mL / OUT: 0 mL / NET: 240 mL      Daily Weight in k (2023 05:10)    CAPILLARY BLOOD GLUCOSE      POCT Blood Glucose.: 132 mg/dL (2023 11:33)  POCT Blood Glucose.: 125 mg/dL (2023 07:27)  POCT Blood Glucose.: 128 mg/dL (2023 21:30)  POCT Blood Glucose.: 183 mg/dL (2023 16:13)        PHYSICAL EXAM:  GENERAL: NAD  HEAD:  Normocephalic  EYES:  conjunctiva and sclera clear  ENMT: Moist mucous membranes  NECK: Supple  NERVOUS SYSTEM:  Alert, awake, Good concentration  CHEST/LUNG: decreased BS at bases b/l  HEART: Regular rate and rhythm  ABDOMEN: Soft, Nontender, Nondistended; Bowel sounds present  EXTREMITIES: right AKA stump, left LE cool and discolored (chronic per pt), + wound with dressing  SKIN: left LE cool to touch    Consultant(s) Notes Reviewed:  [x ] YES  [ ] NO  Care Discussed with Consultants/Other Providers [ x] YES  [ ] NO    MEDICATIONS  (STANDING):  aspirin enteric coated 81 milliGRAM(s) Oral daily  bacitracin   Ointment 1 Application(s) Topical two times a day  clobetasol 0.05% Cream 1 Application(s) Topical two times a day  dextrose 5%. 1000 milliLiter(s) (50 mL/Hr) IV Continuous <Continuous>  dextrose 5%. 1000 milliLiter(s) (100 mL/Hr) IV Continuous <Continuous>  dextrose 50% Injectable 25 Gram(s) IV Push once  dextrose 50% Injectable 12.5 Gram(s) IV Push once  dextrose 50% Injectable 25 Gram(s) IV Push once  diazepam    Tablet 1 milliGRAM(s) Oral at bedtime  digoxin     Tablet 125 MICROGram(s) Oral daily  DULoxetine 30 milliGRAM(s) Oral <User Schedule>  enoxaparin Injectable 30 milliGRAM(s) SubCutaneous every 24 hours  furosemide   Injectable 40 milliGRAM(s) IV Push two times a day  glucagon  Injectable 1 milliGRAM(s) IntraMuscular once  insulin glargine Injectable (LANTUS) 16 Unit(s) SubCutaneous every morning  insulin glargine Injectable (LANTUS) 10 Unit(s) SubCutaneous at bedtime  insulin lispro (ADMELOG) corrective regimen sliding scale   SubCutaneous three times a day before meals  levothyroxine 50 MICROGram(s) Oral <User Schedule>  levothyroxine 25 MICROGram(s) Oral <User Schedule>  metoprolol succinate ER 25 milliGRAM(s) Oral daily  multivitamin 1 Tablet(s) Oral daily  pantoprazole    Tablet 40 milliGRAM(s) Oral before breakfast  prasugrel 10 milliGRAM(s) Oral daily  spironolactone 25 milliGRAM(s) Oral daily    MEDICATIONS  (PRN):  acetaminophen     Tablet .. 650 milliGRAM(s) Oral every 6 hours PRN Temp greater or equal to 38C (100.4F), Mild Pain (1 - 3)  aluminum hydroxide/magnesium hydroxide/simethicone Suspension 30 milliLiter(s) Oral every 4 hours PRN Dyspepsia  dextrose Oral Gel 15 Gram(s) Oral once PRN Blood Glucose LESS THAN 70 milliGRAM(s)/deciliter  melatonin 3 milliGRAM(s) Oral at bedtime PRN Insomnia  ondansetron Injectable 4 milliGRAM(s) IV Push every 8 hours PRN Nausea and/or Vomiting  oxyCODONE    IR 10 milliGRAM(s) Oral every 6 hours PRN Moderate Pain (4 - 6)      Telemetry reviewed by me    LABS:                        10.4   6.74  )-----------( 323      ( 2023 05:48 )             35.9         137  |  98  |  39<H>  ----------------------------<  124<H>  5.1<H>   |  27  |  1.0    Ca    8.6      2023 05:48  Phos  4.5       Mg     1.8         TPro  6.8  /  Alb  3.2<L>  /  TBili  0.7  /  DBili  x   /  AST  28  /  ALT  25  /  AlkPhos  161<H>        CARDIAC MARKERS ( 2023 17:04 )  x     / x     / 73 U/L / x     / x            Culture - Blood (collected 2023 17:04)  Source: .Blood None  Preliminary Report (2023 01:01):    No growth to date.        RADIOLOGY & ADDITIONAL TESTS:    Imaging and report Personally Reviewed:  [x ] YES  [ ] NO    < from: Xray Chest 1 View- PORTABLE-Routine (Xray Chest 1 View- PORTABLE-Routine in AM.) (23 @ 06:52) >    Impression:    Bilateral opacities/pleural effusions and cardiomegaly, unchanged.      < end of copied text >      < from: VA Duplex Upper Extrem Arterial, Bilat (23 @ 08:07) >  IMPRESSION:  Normal blood flow in bilateral upper extremity arterial system.      < end of copied text >      < from: VA Duplex Lower Extrem Arterial, Bilat (23 @ 08:07) >  IMPRESSION:  History of right above knee amputation.  Normal blood flow in the visualized arteries of left lower extremity.   Left calf arteries were not visualized due to dressing.    < end of copied text >      < from: Xray Foot AP + Lateral, Left (23 @ 14:33) >  IMPRESSION:  Possible osteomyelitis at the second toe middle phalanx amputation margin.    < end of copied text >      < from: Xray Hand 3 Views, Bilateral (23 @ 14:09) >  IMPRESSION:  Erosions of the right middle finger proximal phalanx suggestive of gout,   less likely an inflammatory arthropathy. Questionable similar erosions of   theleft ulnar styloid.    < end of copied text >      < from: JOHN PAUL w/Doppler (22 @ 10:31) >  CONCLUSIONS:     1. Severely reduced left ventricular systolic function.   2. Normal right ventricular size.   3. Reduced right ventricular systolic function.   4. Dilated left atrium.   5. No LA/RA/VERONICA/RAA thrombus seen.   6. No evidence of an intracardiac shunt.   7. Mild mitral regurgitation.   8. Mild tricuspid regurgitation.   9. Noechocardiographic evidence of vegetations on the valves or device   lead .  10. No evidence of pulmonary hypertension.  11. No pericardial effusion.  12. Compared to the previous JOHN PAUL performed on 2020, there have been   no significant interval changes.    < end of copied text >      Case discussed with resident and RN on rounds today    Care discussed with pt        
  LOIDAFLOWER KLINE  67y Male    INTERVAL HPI/OVERNIGHT EVENTS:    didn't sleep much last night so he's tired today  breathing OK  urinating  no new complaints    T(F): 96 (23 @ 13:07), Max: 96.2 (23 @ 20:14)  HR: 78 (23 @ 13:07) (75 - 78)  BP: 107/58 (23 @ 13:07) (106/55 - 114/-)  RR: 18 (23 @ 13:07) (18 - 18)  SpO2: --    I&O's Summary    2023 07:01  -  2023 07:00  --------------------------------------------------------  IN: 835 mL / OUT: 375 mL / NET: 460 mL        Daily Height in cm: 185.42 (2023 21:42)    Daily Weight in k (2023 04:58)    CAPILLARY BLOOD GLUCOSE      POCT Blood Glucose.: 136 mg/dL (2023 11:41)  POCT Blood Glucose.: 134 mg/dL (2023 08:12)  POCT Blood Glucose.: 151 mg/dL (2023 22:19)  POCT Blood Glucose.: 137 mg/dL (2023 16:43)        PHYSICAL EXAM:  GENERAL: NAD  HEAD:  Normocephalic  EYES:  conjunctiva and sclera clear  ENMT: Moist mucous membranes  NECK: Supple  NERVOUS SYSTEM:  Alert, awake, Good concentration  CHEST/LUNG: decreased BS b/l  HEART: Regular rate and rhythm  ABDOMEN: Soft, Nontender, Nondistended  EXTREMITIES: right AKA  left LE less cool today, + dressing  SKIN: ulcers of both wrists, ulcerations and deformity of MCP joints, less erythematous today, limited ROM of fingers    Consultant(s) Notes Reviewed:  [x ] YES  [ ] NO  Care Discussed with Consultants/Other Providers [ x] YES  [ ] NO    MEDICATIONS  (STANDING):  aspirin enteric coated 81 milliGRAM(s) Oral daily  bacitracin   Ointment 1 Application(s) Topical two times a day  clobetasol 0.05% Cream 1 Application(s) Topical two times a day  dextrose 5%. 1000 milliLiter(s) (50 mL/Hr) IV Continuous <Continuous>  dextrose 5%. 1000 milliLiter(s) (100 mL/Hr) IV Continuous <Continuous>  dextrose 50% Injectable 25 Gram(s) IV Push once  dextrose 50% Injectable 12.5 Gram(s) IV Push once  dextrose 50% Injectable 25 Gram(s) IV Push once  diazepam    Tablet 1 milliGRAM(s) Oral at bedtime  digoxin     Tablet 125 MICROGram(s) Oral daily  DULoxetine 30 milliGRAM(s) Oral <User Schedule>  enoxaparin Injectable 30 milliGRAM(s) SubCutaneous every 24 hours  furosemide   Injectable 40 milliGRAM(s) IV Push two times a day  glucagon  Injectable 1 milliGRAM(s) IntraMuscular once  insulin glargine Injectable (LANTUS) 16 Unit(s) SubCutaneous every morning  insulin glargine Injectable (LANTUS) 10 Unit(s) SubCutaneous at bedtime  insulin lispro (ADMELOG) corrective regimen sliding scale   SubCutaneous three times a day before meals  levothyroxine 50 MICROGram(s) Oral <User Schedule>  levothyroxine 25 MICROGram(s) Oral <User Schedule>  metoprolol succinate ER 25 milliGRAM(s) Oral daily  multivitamin 1 Tablet(s) Oral daily  pantoprazole    Tablet 40 milliGRAM(s) Oral before breakfast  prasugrel 10 milliGRAM(s) Oral daily  spironolactone 25 milliGRAM(s) Oral daily    MEDICATIONS  (PRN):  acetaminophen     Tablet .. 650 milliGRAM(s) Oral every 6 hours PRN Temp greater or equal to 38C (100.4F), Mild Pain (1 - 3)  aluminum hydroxide/magnesium hydroxide/simethicone Suspension 30 milliLiter(s) Oral every 4 hours PRN Dyspepsia  dextrose Oral Gel 15 Gram(s) Oral once PRN Blood Glucose LESS THAN 70 milliGRAM(s)/deciliter  melatonin 3 milliGRAM(s) Oral at bedtime PRN Insomnia  ondansetron Injectable 4 milliGRAM(s) IV Push every 8 hours PRN Nausea and/or Vomiting  oxyCODONE    IR 10 milliGRAM(s) Oral every 6 hours PRN Moderate Pain (4 - 6)      Telemetry reviewed by me    LABS:                        11.5   6.27  )-----------( 350      ( 2023 06:29 )             39.7         134<L>  |  95<L>  |  47<H>  ----------------------------<  141<H>  5.6<H>   |  25  |  1.2    Ca    8.7      2023 06:29  Mg     1.9         TPro  6.9  /  Alb  3.4<L>  /  TBili  1.0  /  DBili  x   /  AST  43<H>  /  ALT  33  /  AlkPhos  194<H>              Culture - Blood (collected 2023 17:04)  Source: .Blood None  Preliminary Report (2023 01:01):    No growth to date.          Case discussed with RN today    Care discussed with pt        
  LOIDAFLOWER KLINE  67y Male    INTERVAL HPI/OVERNIGHT EVENTS:    pt states IV accidentally fell out because he has constant movements of his arms and left LE  agreeable for continued medical treatment   no pain, denies increased SOB  case discussed with Dr. Nelson today -> change to torsemide   CXR with increased PVC and right pleural effusion    T(F): 96 (02-27-23 @ 05:09), Max: 96 (02-26-23 @ 13:07)  HR: 74 (02-27-23 @ 05:09) (74 - 78)  BP: 113/69 (02-27-23 @ 05:09) (107/58 - 116/70)  RR: 18 (02-27-23 @ 05:09) (18 - 18)  SpO2: --    I&O's Summary    26 Feb 2023 07:01  -  27 Feb 2023 07:00  --------------------------------------------------------  IN: 0 mL / OUT: 100 mL / NET: -100 mL      CAPILLARY BLOOD GLUCOSE      POCT Blood Glucose.: 105 mg/dL (27 Feb 2023 11:30)  POCT Blood Glucose.: 52 mg/dL (27 Feb 2023 07:25)  POCT Blood Glucose.: 122 mg/dL (26 Feb 2023 21:40)  POCT Blood Glucose.: 146 mg/dL (26 Feb 2023 16:48)        PHYSICAL EXAM:  GENERAL: NAD  HEAD:  Normocephalic  EYES:  conjunctiva and sclera clear  ENMT: Moist mucous membranes  NECK: Supple  NERVOUS SYSTEM:  Alert, awake, Good concentration  CHEST/LUNG: decreased BS b/l  HEART: Regular rate and rhythm  ABDOMEN: Soft, Nontender, Nondistended; Bowel sounds present  EXTREMITIES: right AKA, left LE with ulcers, dressing in place  SKIN: ulcers of hands and wrists, deformity of MCPs    Consultant(s) Notes Reviewed:  [x ] YES  [ ] NO  Care Discussed with Consultants/Other Providers [ x] YES  [ ] NO    MEDICATIONS  (STANDING):  aspirin enteric coated 81 milliGRAM(s) Oral daily  bacitracin   Ointment 1 Application(s) Topical two times a day  clobetasol 0.05% Cream 1 Application(s) Topical two times a day  dextrose 5%. 1000 milliLiter(s) (50 mL/Hr) IV Continuous <Continuous>  dextrose 5%. 1000 milliLiter(s) (100 mL/Hr) IV Continuous <Continuous>  dextrose 50% Injectable 25 Gram(s) IV Push once  dextrose 50% Injectable 12.5 Gram(s) IV Push once  dextrose 50% Injectable 25 Gram(s) IV Push once  diazepam    Tablet 1 milliGRAM(s) Oral at bedtime  digoxin     Tablet 125 MICROGram(s) Oral daily  DULoxetine 30 milliGRAM(s) Oral <User Schedule>  enoxaparin Injectable 30 milliGRAM(s) SubCutaneous every 24 hours  glucagon  Injectable 1 milliGRAM(s) IntraMuscular once  insulin glargine Injectable (LANTUS) 5 Unit(s) SubCutaneous at bedtime  insulin lispro (ADMELOG) corrective regimen sliding scale   SubCutaneous three times a day before meals  levothyroxine 50 MICROGram(s) Oral <User Schedule>  levothyroxine 25 MICROGram(s) Oral <User Schedule>  metoprolol succinate ER 25 milliGRAM(s) Oral daily  multivitamin 1 Tablet(s) Oral daily  pantoprazole    Tablet 40 milliGRAM(s) Oral before breakfast  prasugrel 10 milliGRAM(s) Oral daily  torsemide 40 milliGRAM(s) Oral two times a day    MEDICATIONS  (PRN):  acetaminophen     Tablet .. 650 milliGRAM(s) Oral every 6 hours PRN Temp greater or equal to 38C (100.4F), Mild Pain (1 - 3)  aluminum hydroxide/magnesium hydroxide/simethicone Suspension 30 milliLiter(s) Oral every 4 hours PRN Dyspepsia  dextrose Oral Gel 15 Gram(s) Oral once PRN Blood Glucose LESS THAN 70 milliGRAM(s)/deciliter  melatonin 3 milliGRAM(s) Oral at bedtime PRN Insomnia  ondansetron Injectable 4 milliGRAM(s) IV Push every 8 hours PRN Nausea and/or Vomiting  oxyCODONE    IR 10 milliGRAM(s) Oral every 6 hours PRN Moderate Pain (4 - 6)      Telemetry reviewed by me    LABS:                        11.5   6.27  )-----------( 350      ( 26 Feb 2023 06:29 )             39.7     02-27    136  |  95<L>  |  53<H>  ----------------------------<  74  5.5<H>   |  31  |  1.6<H>    Ca    9.4      27 Feb 2023 05:07  Mg     1.9     02-27    TPro  6.9  /  Alb  3.4<L>  /  TBili  1.0  /  DBili  x   /  AST  43<H>  /  ALT  33  /  AlkPhos  194<H>  02-26              RADIOLOGY & ADDITIONAL TESTS:    Imaging or report Personally Reviewed:  [x ] YES  [ ] NO    < from: Xray Chest 1 View- PORTABLE-Routine (Xray Chest 1 View- PORTABLE-Routine in AM.) (02.27.23 @ 06:54) >    IMPRESSION:    Bilateral opacities, with an increased right pleural effusion.    < end of copied text >      Case discussed with residents and RN on rounds today    Care discussed with pt        
SUBJ: Patient seen and examined. was mildly agitated overnight        MEDICATIONS  (STANDING):  aspirin enteric coated 81 milliGRAM(s) Oral daily  bacitracin   Ointment 1 Application(s) Topical two times a day  clobetasol 0.05% Cream 1 Application(s) Topical two times a day  dextrose 5%. 1000 milliLiter(s) (50 mL/Hr) IV Continuous <Continuous>  dextrose 5%. 1000 milliLiter(s) (100 mL/Hr) IV Continuous <Continuous>  dextrose 50% Injectable 25 Gram(s) IV Push once  dextrose 50% Injectable 12.5 Gram(s) IV Push once  dextrose 50% Injectable 25 Gram(s) IV Push once  diazepam    Tablet 1 milliGRAM(s) Oral at bedtime  DULoxetine 30 milliGRAM(s) Oral <User Schedule>  enoxaparin Injectable 30 milliGRAM(s) SubCutaneous every 24 hours  glucagon  Injectable 1 milliGRAM(s) IntraMuscular once  insulin glargine Injectable (LANTUS) 5 Unit(s) SubCutaneous at bedtime  insulin lispro (ADMELOG) corrective regimen sliding scale   SubCutaneous three times a day before meals  levothyroxine 50 MICROGram(s) Oral <User Schedule>  levothyroxine 25 MICROGram(s) Oral <User Schedule>  metoprolol succinate ER 25 milliGRAM(s) Oral daily  multivitamin 1 Tablet(s) Oral daily  pantoprazole    Tablet 40 milliGRAM(s) Oral before breakfast  prasugrel 10 milliGRAM(s) Oral daily  torsemide 40 milliGRAM(s) Oral two times a day    MEDICATIONS  (PRN):  acetaminophen     Tablet .. 650 milliGRAM(s) Oral every 6 hours PRN Temp greater or equal to 38C (100.4F), Mild Pain (1 - 3)  aluminum hydroxide/magnesium hydroxide/simethicone Suspension 30 milliLiter(s) Oral every 4 hours PRN Dyspepsia  dextrose Oral Gel 15 Gram(s) Oral once PRN Blood Glucose LESS THAN 70 milliGRAM(s)/deciliter  melatonin 3 milliGRAM(s) Oral at bedtime PRN Insomnia  ondansetron Injectable 4 milliGRAM(s) IV Push every 8 hours PRN Nausea and/or Vomiting  oxyCODONE    IR 10 milliGRAM(s) Oral every 6 hours PRN Moderate Pain (4 - 6)            Vital Signs Last 24 Hrs  T(C): 35.5 (27 Feb 2023 14:15), Max: 35.6 (26 Feb 2023 20:43)  T(F): 95.9 (27 Feb 2023 14:15), Max: 96 (26 Feb 2023 20:43)  HR: 78 (27 Feb 2023 14:15) (74 - 78)  BP: 120/71 (27 Feb 2023 14:15) (113/69 - 120/71)  BP(mean): --  RR: 18 (27 Feb 2023 14:15) (18 - 18)  SpO2: --         REVIEW OF SYSTEMS:  CONSTITUTIONAL: No fever  NECK: No pain or stiffness  CARDIOVASCULAR: patient denies chest pain, shortness of breath or palpitations .  Respiratory: No cough or wheezing.  NEUROLOGICAL: No focal deficits to report.  GI: no BRBPR, no N,V,diarrhea.    PSYCHIATRY: normal mood and affect  HEENT: no nasal discharge, no ecchymosis  SKIN: itchy rash  MUSCULOSKELETAL: Full range of motion x4.      PHYSICAL EXAM:  GENERAL: NAD  HEAD:  Atraumatic, Normocephalic  NECK: Supple, No JVD  NERVOUS SYSTEM:  Alert & Oriented X3, Good concentration  CHEST/LUNG: Clear to anterior auscultation bilaterally  HEART: Regular rate and rhythm  ABDOMEN: Soft, Nontender, Nondistended;   EXTREMITIES:  No  edema, RT AKA   SKIN: scaly rash   Psych: Appropriate mood and affect     	  TELEMETRY:      LABS:                        11.5   6.27  )-----------( 350      ( 26 Feb 2023 06:29 )             39.7     02-27    136  |  95<L>  |  53<H>  ----------------------------<  74  5.5<H>   |  31  |  1.6<H>    Ca    9.4      27 Feb 2023 05:07  Mg     1.9     02-27    TPro  6.9  /  Alb  3.4<L>  /  TBili  1.0  /  DBili  x   /  AST  43<H>  /  ALT  33  /  AlkPhos  194<H>  02-26            I&O's Summary    26 Feb 2023 07:01  -  27 Feb 2023 07:00  --------------------------------------------------------  IN: 0 mL / OUT: 100 mL / NET: -100 mL    27 Feb 2023 07:01  -  27 Feb 2023 18:26  --------------------------------------------------------  IN: 200 mL / OUT: 600 mL / NET: -400 mL      BNP  RADIOLOGY & ADDITIONAL STUDIES:    IMPRESSION AND PLAN:    CAD   Ischemic cardiomyopathy   HFrEF exacerbation  - Management as per primary team   Please transition to oral Torsemide  - Hold Digoxin   - Will follow     
FLOWER MARISCAL 67y Male  MRN#: [id number]   CODE STATUS: FULL      SUBJECTIVE  Patient is a 67y old Male who presents with a chief complaint of CHF (27 Feb 2023 14:05)    Today is hospital day 6d,   INTERVAL HPI/OVERNIGHT EVENTS:    This morning he is laying in bed comfortably. Pt Denies chest pain, shortness of breath, abdominal pain, nausea, vomiting or changes in bowel habits. Pt has no complaints today.     Pt is Urinating and stooling appropriately.    Present Today:           Loja Catheter (x)No/ ()Yes?   Indication:             Central Line (x)No/ ()Yes?   Indication:          IV Fluids (x)No/ ()Yes? Type:  Rate:  Indication:    OBJECTIVE  PAST MEDICAL & SURGICAL HISTORY  Status post percutaneous transluminal coronary angioplasty  2 stents    Atherosclerosis of coronary artery  CAD (coronary artery disease)    Osteoarthritis    HLD (hyperlipidemia)    Diabetes  on insulin pump    CHF (congestive heart failure)  EF ~ 25%    History of celiac disease    Diverticulitis    STEMI (ST elevation myocardial infarction)    Diabetic neuropathy  Hands and Feet    Anxiety and depression    Other postprocedural status  Fixation hardware in foot LEFT    Stented coronary artery  10/18 heart attack  INFERIOR WALL MI    S/P CABG x 1  2018    S/P TKR (total knee replacement), right  with infection Mrsa   per pt he was cleared from MRSA infection    Surgery, elective  right knee wound debridement    History of open reduction and internal fixation (ORIF) procedure  right hip    H/O shoulder surgery  right    AICD (automatic cardioverter/defibrillator) present  [facility]    Cholecystostomy care  drain inserted 2020 &amp; removed 4 months ago    History of tonsillectomy    History of hip replacement, total, right    Elective surgery  plastic surgery Left shin      ALLERGIES:  ACE inhibitors (Rash)  Atorvastatin Calcium (Unknown)  Bactrim (Unknown)  Entresto (Swelling)    HOME MEDICATIONS:  Home Medications:  aspirin 81 mg oral delayed release tablet: 1 tab(s) orally once a day (23 Feb 2023 02:35)  diazePAM 2 mg oral tablet: 0.5 tab(s) orally once a day (at bedtime) (23 Feb 2023 02:35)  digoxin 125 mcg (0.125 mg) oral tablet: 1 tab(s) orally once a day (23 Feb 2023 02:35)  DULoxetine 30 mg oral delayed release capsule: 1 cap(s) orally 3 times a day (23 Feb 2023 02:35)  insulin glargine 100 units/mL subcutaneous solution: 25 unit(s) subcutaneous once a day (at bedtime) (23 Feb 2023 02:35)  insulin glargine 100 units/mL subcutaneous solution: 40 microcurie subcutaneous once a day (23 Feb 2023 02:35)  insulin lispro 100 units/mL injectable solution: if your finger stick is 150-199 please inject 2 units  if your finger stick is 200-250 please inject 4 units  if your finger stick is 251-300 please inject 6 units  if your finger stick is 301-350 please inject 8 units  if your finger stick is more than 350 please call your physician    (23 Feb 2023 02:35)  levothyroxine 25 mcg (0.025 mg) oral tablet: 1 tab(s) orally 6 times a week (23 Feb 2023 02:35)  levothyroxine 50 mcg (0.05 mg) oral tablet: 1 tab(s) orally once a week (23 Feb 2023 02:35)  metoprolol succinate 25 mg oral tablet, extended release: 1 tab(s) orally once a day (23 Feb 2023 02:35)  Multiple Vitamins oral tablet: 1 tab(s) orally once a day (23 Feb 2023 02:35)  pantoprazole 40 mg oral delayed release tablet: 1 tab(s) orally once a day (before a meal) (23 Feb 2023 02:35)  prasugrel 10 mg oral tablet: 1 tab(s) orally once a day (23 Feb 2023 02:35)  rosuvastatin 40 mg oral tablet: 1 tab(s) orally once a day (23 Feb 2023 02:35)  spironolactone 25 mg oral tablet: 1 tab(s) orally once a day (23 Feb 2023 02:35)  triamcinolone 0.1% topical cream: Apply topically to affected area 2 times a day (23 Feb 2023 02:35)    MEDICATIONS:  STANDING MEDICATIONS  aspirin enteric coated 81 milliGRAM(s) Oral daily  bacitracin   Ointment 1 Application(s) Topical two times a day  clobetasol 0.05% Cream 1 Application(s) Topical two times a day  dextrose 5%. 1000 milliLiter(s) (50 mL/Hr) IV Continuous <Continuous>  dextrose 5%. 1000 milliLiter(s) (100 mL/Hr) IV Continuous <Continuous>  dextrose 50% Injectable 25 Gram(s) IV Push once  dextrose 50% Injectable 12.5 Gram(s) IV Push once  dextrose 50% Injectable 25 Gram(s) IV Push once  diazepam    Tablet 1 milliGRAM(s) Oral at bedtime  DULoxetine 30 milliGRAM(s) Oral <User Schedule>  enoxaparin Injectable 30 milliGRAM(s) SubCutaneous every 24 hours  glucagon  Injectable 1 milliGRAM(s) IntraMuscular once  insulin glargine Injectable (LANTUS) 5 Unit(s) SubCutaneous at bedtime  insulin lispro (ADMELOG) corrective regimen sliding scale   SubCutaneous three times a day before meals  levothyroxine 50 MICROGram(s) Oral <User Schedule>  levothyroxine 25 MICROGram(s) Oral <User Schedule>  metoprolol succinate ER 25 milliGRAM(s) Oral daily  multivitamin 1 Tablet(s) Oral daily  pantoprazole    Tablet 40 milliGRAM(s) Oral before breakfast  prasugrel 10 milliGRAM(s) Oral daily  torsemide 40 milliGRAM(s) Oral two times a day    PRN MEDICATIONS  acetaminophen     Tablet .. 650 milliGRAM(s) Oral every 6 hours PRN  aluminum hydroxide/magnesium hydroxide/simethicone Suspension 30 milliLiter(s) Oral every 4 hours PRN  dextrose Oral Gel 15 Gram(s) Oral once PRN  melatonin 3 milliGRAM(s) Oral at bedtime PRN  ondansetron Injectable 4 milliGRAM(s) IV Push every 8 hours PRN  oxyCODONE    IR 10 milliGRAM(s) Oral every 6 hours PRN      VITAL SIGNS: Last 24 Hours  T(C): 35.8 (28 Feb 2023 04:53), Max: 36.6 (27 Feb 2023 19:59)  T(F): 96.5 (28 Feb 2023 04:53), Max: 97.9 (27 Feb 2023 19:59)  HR: 74 (28 Feb 2023 04:53) (74 - 78)  BP: 113/69 (28 Feb 2023 04:53) (113/69 - 120/71)  BP(mean): --  RR: 18 (28 Feb 2023 04:53) (18 - 18)  SpO2: --    LABS:    02-27    136  |  95<L>  |  53<H>  ----------------------------<  74  5.5<H>   |  31  |  1.6<H>    Ca    9.4      27 Feb 2023 05:07  Mg     1.9     02-27            Lactate, Blood: 1.2 mmol/L (02-27-23 @ 11:20)          RADIOLOGY:  Xray Chest 1 View- PORTABLE-Routine:   ACC: [id number] EXAM:  XR CHEST PORTABLE ROUTINE 1V   ORDERED BY: ANDRESSA PRADHAN     PROCEDURE DATE:  02/28/2023          INTERPRETATION:  STUDY INDICATION: CHF    TECHNIQUE:  Portable frontal view of the chest obtained.    COMPARISON: 2/27/2023    FINDINGS/  IMPRESSION:    Unchanged right pleural effusion and opacities. No pneumothorax.    Stable cardiomediastinal silhouette.    Unchanged osseous structures.    --- End of Report ---            MARILIN CORTES MD; Attending Radiologist  This document has been electronically signed. Feb 28 2023 11:07AM (02-28-23 @ 06:49)          ECHO:  < from: JOHN PAUL w/Doppler (01.12.22 @ 10:31) >  CONCLUSIONS:     1. Severely reduced left ventricular systolic function.   2. Normal right ventricular size.   3. Reduced right ventricular systolic function.   4. Dilated left atrium.   5. No LA/RA/VERONICA/RAA thrombus seen.   6. No evidence of an intracardiac shunt.   7. Mild mitral regurgitation.   8. Mild tricuspid regurgitation.   9. Noechocardiographic evidence of vegetations on the valves or device   lead .  10. No evidence of pulmonary hypertension.  11. No pericardial effusion.  12. Compared to the previous JOHN PAUL performed on 7/21/2020, there have been   no significant interval changes.    < end of copied text >      PHYSICAL EXAM:    GENERAL: NAD, well-developed, AAOx3  HEENT:  Atraumatic, Normocephalic. EOMI, PERRLA, conjunctiva and sclera clear, No JVD  PULMONARY: Clear to auscultation bilaterally; No wheeze  CARDIOVASCULAR: Regular rate and rhythm; No murmurs, rubs, or gallops  GASTROINTESTINAL: Soft, Nontender, Nondistended; Bowel sounds present  MUSCULOSKELETAL:  2+ Peripheral Pulses, No clubbing, cyanosis, or edema  NEUROLOGY: non-focal  SKIN: No rashes or lesions  
FLOWER MARISCAL 67y Male  MRN#: [id number]   CODE STATUS: FULL      SUBJECTIVE  Patient is a 67y old Male who presents with a chief complaint of CHF (01 Mar 2023 09:16)    Today is hospital day 7d,   INTERVAL HPI/OVERNIGHT EVENTS:    This morning he is laying in bed comfortably. Pt Denies chest pain, shortness of breath, abdominal pain, nausea, vomiting or changes in bowel habits. Pt has no complaints today.     Pt is Urinating and stooling appropriately.      Present Today:           Loja Catheter (x)No/ ()Yes?   Indication:             Central Line (x)No/ ()Yes?   Indication:          IV Fluids (x)No/ ()Yes? Type:  Rate:  Indication:    OBJECTIVE  PAST MEDICAL & SURGICAL HISTORY  Status post percutaneous transluminal coronary angioplasty  2 stents    Atherosclerosis of coronary artery  CAD (coronary artery disease)    Osteoarthritis    HLD (hyperlipidemia)    Diabetes  on insulin pump    CHF (congestive heart failure)  EF ~ 25%    History of celiac disease    Diverticulitis    STEMI (ST elevation myocardial infarction)    Diabetic neuropathy  Hands and Feet    Anxiety and depression    Other postprocedural status  Fixation hardware in foot LEFT    Stented coronary artery  10/18 heart attack  INFERIOR WALL MI    S/P CABG x 1  2018    S/P TKR (total knee replacement), right  with infection Mrsa   per pt he was cleared from MRSA infection    Surgery, elective  right knee wound debridement    History of open reduction and internal fixation (ORIF) procedure  right hip    H/O shoulder surgery  right    AICD (automatic cardioverter/defibrillator) present  [facility]    Cholecystostomy care  drain inserted 2020 &amp; removed 4 months ago    History of tonsillectomy    History of hip replacement, total, right    Elective surgery  plastic surgery Left shin      ALLERGIES:  ACE inhibitors (Rash)  Atorvastatin Calcium (Unknown)  Bactrim (Unknown)  Entresto (Swelling)    HOME MEDICATIONS:  Home Medications:  aspirin 81 mg oral delayed release tablet: 1 tab(s) orally once a day (23 Feb 2023 02:35)  diazePAM 2 mg oral tablet: 0.5 tab(s) orally once a day (at bedtime) (23 Feb 2023 02:35)  digoxin 125 mcg (0.125 mg) oral tablet: 1 tab(s) orally once a day (23 Feb 2023 02:35)  DULoxetine 30 mg oral delayed release capsule: 1 cap(s) orally 3 times a day (23 Feb 2023 02:35)  insulin glargine 100 units/mL subcutaneous solution: 25 unit(s) subcutaneous once a day (at bedtime) (23 Feb 2023 02:35)  insulin glargine 100 units/mL subcutaneous solution: 40 microcurie subcutaneous once a day (23 Feb 2023 02:35)  insulin lispro 100 units/mL injectable solution: if your finger stick is 150-199 please inject 2 units  if your finger stick is 200-250 please inject 4 units  if your finger stick is 251-300 please inject 6 units  if your finger stick is 301-350 please inject 8 units  if your finger stick is more than 350 please call your physician    (23 Feb 2023 02:35)  levothyroxine 25 mcg (0.025 mg) oral tablet: 1 tab(s) orally 6 times a week (23 Feb 2023 02:35)  levothyroxine 50 mcg (0.05 mg) oral tablet: 1 tab(s) orally once a week (23 Feb 2023 02:35)  metoprolol succinate 25 mg oral tablet, extended release: 1 tab(s) orally once a day (23 Feb 2023 02:35)  Multiple Vitamins oral tablet: 1 tab(s) orally once a day (23 Feb 2023 02:35)  pantoprazole 40 mg oral delayed release tablet: 1 tab(s) orally once a day (before a meal) (23 Feb 2023 02:35)  prasugrel 10 mg oral tablet: 1 tab(s) orally once a day (23 Feb 2023 02:35)  rosuvastatin 40 mg oral tablet: 1 tab(s) orally once a day (23 Feb 2023 02:35)  spironolactone 25 mg oral tablet: 1 tab(s) orally once a day (23 Feb 2023 02:35)  triamcinolone 0.1% topical cream: Apply topically to affected area 2 times a day (23 Feb 2023 02:35)    MEDICATIONS:  STANDING MEDICATIONS  aspirin enteric coated 81 milliGRAM(s) Oral daily  bacitracin   Ointment 1 Application(s) Topical two times a day  clobetasol 0.05% Cream 1 Application(s) Topical two times a day  dextrose 5%. 1000 milliLiter(s) (100 mL/Hr) IV Continuous <Continuous>  dextrose 5%. 1000 milliLiter(s) (50 mL/Hr) IV Continuous <Continuous>  dextrose 50% Injectable 25 Gram(s) IV Push once  dextrose 50% Injectable 12.5 Gram(s) IV Push once  dextrose 50% Injectable 25 Gram(s) IV Push once  diazepam    Tablet 1 milliGRAM(s) Oral at bedtime  DULoxetine 30 milliGRAM(s) Oral <User Schedule>  enoxaparin Injectable 30 milliGRAM(s) SubCutaneous every 24 hours  glucagon  Injectable 1 milliGRAM(s) IntraMuscular once  insulin glargine Injectable (LANTUS) 5 Unit(s) SubCutaneous at bedtime  insulin lispro (ADMELOG) corrective regimen sliding scale   SubCutaneous three times a day before meals  levothyroxine 50 MICROGram(s) Oral <User Schedule>  levothyroxine 25 MICROGram(s) Oral <User Schedule>  metoprolol succinate ER 25 milliGRAM(s) Oral daily  multivitamin 1 Tablet(s) Oral daily  pantoprazole    Tablet 40 milliGRAM(s) Oral before breakfast  prasugrel 10 milliGRAM(s) Oral daily  torsemide 40 milliGRAM(s) Oral two times a day    PRN MEDICATIONS  acetaminophen     Tablet .. 650 milliGRAM(s) Oral every 6 hours PRN  aluminum hydroxide/magnesium hydroxide/simethicone Suspension 30 milliLiter(s) Oral every 4 hours PRN  dextrose Oral Gel 15 Gram(s) Oral once PRN  melatonin 3 milliGRAM(s) Oral at bedtime PRN  ondansetron Injectable 4 milliGRAM(s) IV Push every 8 hours PRN  oxyCODONE    IR 10 milliGRAM(s) Oral every 6 hours PRN      VITAL SIGNS: Last 24 Hours  T(C): 36.6 (01 Mar 2023 05:19), Max: 36.7 (28 Feb 2023 19:42)  T(F): 97.8 (01 Mar 2023 05:19), Max: 98.1 (28 Feb 2023 19:42)  HR: 62 (01 Mar 2023 05:19) (62 - 72)  BP: 110/60 (01 Mar 2023 05:19) (106/64 - 110/60)  BP(mean): --  RR: 18 (01 Mar 2023 05:19) (18 - 18)  SpO2: 95% (01 Mar 2023 09:30) (95% - 95%)    LABS:                        12.8   6.86  )-----------( 407      ( 01 Mar 2023 06:06 )             43.2     03-01    135  |  92<L>  |  49<H>  ----------------------------<  98  4.5   |  31  |  1.3    Ca    9.0      01 Mar 2023 06:06  Mg     1.9     02-28    TPro  7.2  /  Alb  3.2<L>  /  TBili  1.5<H>  /  DBili  x   /  AST  49<H>  /  ALT  58<H>  /  AlkPhos  195<H>  03-01                  RADIOLOGY:  Xray Chest 1 View- PORTABLE-Routine:   ACC: [id number] EXAM:  XR CHEST PORTABLE ROUTINE 1V   ORDERED BY: MYRNA SHELL     PROCEDURE DATE:  03/01/2023          INTERPRETATION:  Clinical History / Reason for exam: Shortness of breath    Comparison : Chest radiograph February 20, 2023.    Technique/Positioning: Frontal portable.    Findings:    Support devices: Dual-chamber left-sided ICD    Cardiac/mediastinum/hilum: Cardiomegaly    Lung parenchyma/Pleura: Bilateral opacities    Skeleton/soft tissues: Unchanged    Impression:    Cardiomegaly with bilateral opacifications. Unchanged.        --- End of Report ---            LANE ISAACS MD; Attending Interventional Radiologist  This document has been electronically signed. Mar  1 2023  8:54AM (03-01-23 @ 06:45)        ECHO:  < from: JOHN PAUL w/Doppler (01.12.22 @ 10:31) >  CONCLUSIONS:     1. Severely reduced left ventricular systolic function.   2. Normal right ventricular size.   3. Reduced right ventricular systolic function.   4. Dilated left atrium.   5. No LA/RA/VERONICA/RAA thrombus seen.   6. No evidence of an intracardiac shunt.   7. Mild mitral regurgitation.   8. Mild tricuspid regurgitation.   9. Noechocardiographic evidence of vegetations on the valves or device   lead .  10. No evidence of pulmonary hypertension.  11. No pericardial effusion.  12. Compared to the previous JOHN PAUL performed on 7/21/2020, there have been   no significant interval changes.    < end of copied text >      PHYSICAL EXAM:    GENERAL: NAD, well-developed, AAOx3  HEENT:  Atraumatic, Normocephalic. EOMI, PERRLA, conjunctiva and sclera clear, No JVD  PULMONARY: Clear to auscultation bilaterally; No wheeze  CARDIOVASCULAR: Regular rate and rhythm; No murmurs, rubs, or gallops  GASTROINTESTINAL: Soft, Nontender, Nondistended; Bowel sounds present  MUSCULOSKELETAL:  2+ Peripheral Pulses, No clubbing, cyanosis, or edema  NEUROLOGY: non-focal  SKIN: No rashes or lesions  
SUBJ: Patient seen and examined. No events overnight.       MEDICATIONS  (STANDING):  aspirin enteric coated 81 milliGRAM(s) Oral daily  bacitracin   Ointment 1 Application(s) Topical two times a day  clobetasol 0.05% Cream 1 Application(s) Topical two times a day  dextrose 5%. 1000 milliLiter(s) (100 mL/Hr) IV Continuous <Continuous>  dextrose 5%. 1000 milliLiter(s) (50 mL/Hr) IV Continuous <Continuous>  dextrose 50% Injectable 25 Gram(s) IV Push once  dextrose 50% Injectable 12.5 Gram(s) IV Push once  dextrose 50% Injectable 25 Gram(s) IV Push once  diazepam    Tablet 1 milliGRAM(s) Oral at bedtime  DULoxetine 30 milliGRAM(s) Oral <User Schedule>  enoxaparin Injectable 30 milliGRAM(s) SubCutaneous every 24 hours  glucagon  Injectable 1 milliGRAM(s) IntraMuscular once  insulin glargine Injectable (LANTUS) 5 Unit(s) SubCutaneous at bedtime  insulin lispro (ADMELOG) corrective regimen sliding scale   SubCutaneous three times a day before meals  levothyroxine 50 MICROGram(s) Oral <User Schedule>  levothyroxine 25 MICROGram(s) Oral <User Schedule>  metoprolol succinate ER 25 milliGRAM(s) Oral daily  multivitamin 1 Tablet(s) Oral daily  pantoprazole    Tablet 40 milliGRAM(s) Oral before breakfast  prasugrel 10 milliGRAM(s) Oral daily  torsemide 40 milliGRAM(s) Oral two times a day    MEDICATIONS  (PRN):  acetaminophen     Tablet .. 650 milliGRAM(s) Oral every 6 hours PRN Temp greater or equal to 38C (100.4F), Mild Pain (1 - 3)  aluminum hydroxide/magnesium hydroxide/simethicone Suspension 30 milliLiter(s) Oral every 4 hours PRN Dyspepsia  dextrose Oral Gel 15 Gram(s) Oral once PRN Blood Glucose LESS THAN 70 milliGRAM(s)/deciliter  melatonin 3 milliGRAM(s) Oral at bedtime PRN Insomnia  ondansetron Injectable 4 milliGRAM(s) IV Push every 8 hours PRN Nausea and/or Vomiting  oxyCODONE    IR 10 milliGRAM(s) Oral every 6 hours PRN Moderate Pain (4 - 6)            Vital Signs Last 24 Hrs  T(C): 36.6 (01 Mar 2023 05:19), Max: 36.7 (28 Feb 2023 19:42)  T(F): 97.8 (01 Mar 2023 05:19), Max: 98.1 (28 Feb 2023 19:42)  HR: 62 (01 Mar 2023 05:19) (62 - 72)  BP: 110/60 (01 Mar 2023 05:19) (106/64 - 110/60)  BP(mean): --  RR: 18 (01 Mar 2023 05:19) (18 - 18)  SpO2: 95% (28 Feb 2023 22:06) (95% - 95%)    Parameters below as of 28 Feb 2023 22:06  Patient On (Oxygen Delivery Method): room air         REVIEW OF SYSTEMS:  CONSTITUTIONAL: No fever  NECK: No pain or stiffness  CARDIOVASCULAR: patient denies chest pain, shortness of breath resolved.  Respiratory: No cough or wheezing.  NEUROLOGICAL: No focal deficits to report.  GI: no BRBPR, no N,V,diarrhea.    PSYCHIATRY: normal mood and affect  HEENT: no nasal discharge, no ecchymosis  SKIN: no ecchymosis, no breakdown  MUSCULOSKELETAL: Full range of motion      PHYSICAL EXAM:  GENERAL: NAD  HEAD:  Atraumatic, Normocephalic  NECK: Supple, No JVD  NERVOUS SYSTEM:  Alert & Oriented X3, Good concentration  CHEST/LUNG: Clear to anterior auscultation bilaterally  HEART: Regular rate and rhythm; No murmurs  ABDOMEN: Soft, Nontender, Nondistended;   EXTREMITIES:  No  edema/ Rt AKA  SKIN:  lesions noted  Psych: Appropriate mood and affect     	  TELEMETRY:      LABS:                        12.8   6.86  )-----------( 407      ( 01 Mar 2023 06:06 )             43.2     03-01    135  |  92<L>  |  49<H>  ----------------------------<  98  4.5   |  31  |  1.3    Ca    9.0      01 Mar 2023 06:06  Mg     1.9     02-28    TPro  7.2  /  Alb  3.2<L>  /  TBili  1.5<H>  /  DBili  x   /  AST  49<H>  /  ALT  58<H>  /  AlkPhos  195<H>  03-01            I&O's Summary    28 Feb 2023 07:01  -  01 Mar 2023 07:00  --------------------------------------------------------  IN: 870 mL / OUT: 1450 mL / NET: -580 mL      BNP  RADIOLOGY & ADDITIONAL STUDIES:    IMPRESSION AND PLAN:    HFrEF/ Euvolemic  - Management as per primary team  - May D/C home on Tor eso mid / Continue to hold digoxin  - Will follow out pt     
SUBJ: Patient seen and examined. No events overnight.       MEDICATIONS  (STANDING):  aspirin enteric coated 81 milliGRAM(s) Oral daily  bacitracin   Ointment 1 Application(s) Topical two times a day  clobetasol 0.05% Cream 1 Application(s) Topical two times a day  dextrose 5%. 1000 milliLiter(s) (50 mL/Hr) IV Continuous <Continuous>  dextrose 5%. 1000 milliLiter(s) (100 mL/Hr) IV Continuous <Continuous>  dextrose 50% Injectable 25 Gram(s) IV Push once  dextrose 50% Injectable 12.5 Gram(s) IV Push once  dextrose 50% Injectable 25 Gram(s) IV Push once  diazepam    Tablet 1 milliGRAM(s) Oral at bedtime  DULoxetine 30 milliGRAM(s) Oral <User Schedule>  enoxaparin Injectable 30 milliGRAM(s) SubCutaneous every 24 hours  glucagon  Injectable 1 milliGRAM(s) IntraMuscular once  insulin glargine Injectable (LANTUS) 5 Unit(s) SubCutaneous at bedtime  insulin lispro (ADMELOG) corrective regimen sliding scale   SubCutaneous three times a day before meals  levothyroxine 50 MICROGram(s) Oral <User Schedule>  levothyroxine 25 MICROGram(s) Oral <User Schedule>  metoprolol succinate ER 25 milliGRAM(s) Oral daily  multivitamin 1 Tablet(s) Oral daily  pantoprazole    Tablet 40 milliGRAM(s) Oral before breakfast  prasugrel 10 milliGRAM(s) Oral daily  torsemide 40 milliGRAM(s) Oral two times a day    MEDICATIONS  (PRN):  acetaminophen     Tablet .. 650 milliGRAM(s) Oral every 6 hours PRN Temp greater or equal to 38C (100.4F), Mild Pain (1 - 3)  aluminum hydroxide/magnesium hydroxide/simethicone Suspension 30 milliLiter(s) Oral every 4 hours PRN Dyspepsia  dextrose Oral Gel 15 Gram(s) Oral once PRN Blood Glucose LESS THAN 70 milliGRAM(s)/deciliter  melatonin 3 milliGRAM(s) Oral at bedtime PRN Insomnia  ondansetron Injectable 4 milliGRAM(s) IV Push every 8 hours PRN Nausea and/or Vomiting  oxyCODONE    IR 10 milliGRAM(s) Oral every 6 hours PRN Moderate Pain (4 - 6)            Vital Signs Last 24 Hrs  T(C): 36.7 (28 Feb 2023 19:42), Max: 36.7 (28 Feb 2023 19:42)  T(F): 98.1 (28 Feb 2023 19:42), Max: 98.1 (28 Feb 2023 19:42)  HR: 65 (28 Feb 2023 22:06) (63 - 74)  BP: 109/66 (28 Feb 2023 19:42) (106/64 - 113/69)  BP(mean): --  RR: 18 (28 Feb 2023 22:06) (18 - 18)  SpO2: 95% (28 Feb 2023 22:06) (95% - 95%)    Parameters below as of 28 Feb 2023 22:06  Patient On (Oxygen Delivery Method): room air         REVIEW OF SYSTEMS:  CONSTITUTIONAL: No fever  NECK: No pain or stiffness  CARDIOVASCULAR: patient denies chest pain, shortness of breath or palpitations .  Respiratory: No cough or wheezing.  NEUROLOGICAL: No focal deficits to report.  GI: no BRBPR, no N,V,diarrhea.    PSYCHIATRY: normal mood and affect  HEENT: no nasal discharge, no ecchymosis  SKIN: no ecchymosis  MUSCULOSKELETAL: Full range of motion x4.      PHYSICAL EXAM:  GENERAL: NAD  HEAD:  Atraumatic, Normocephalic  NECK: Supple, No JVD  NERVOUS SYSTEM:  Alert & Oriented X3, Good concentration  CHEST/LUNG: Clear to anterior auscultation bilaterally  HEART: Regular rate and rhythm; No murmurs  ABDOMEN: Soft, Nontender, Nondistended;   EXTREMITIES:  No  edema, Rt AKA  SKIN:  lesions noted  Psych: Appropriate mood and affect     	  TELEMETRY:      LABS:                        12.5   8.05  )-----------( 372      ( 28 Feb 2023 17:48 )             42.3     02-28    137  |  95<L>  |  57<H>  ----------------------------<  124<H>  4.9   |  29  |  1.5    Ca    9.1      28 Feb 2023 17:48  Mg     1.9     02-28    TPro  7.1  /  Alb  3.4<L>  /  TBili  1.3<H>  /  DBili  x   /  AST  51<H>  /  ALT  53<H>  /  AlkPhos  182<H>  02-28            I&O's Summary    27 Feb 2023 07:01  -  28 Feb 2023 07:00  --------------------------------------------------------  IN: 200 mL / OUT: 1600 mL / NET: -1400 mL    28 Feb 2023 07:01  -  28 Feb 2023 23:47  --------------------------------------------------------  IN: 570 mL / OUT: 1150 mL / NET: -580 mL      BNP  RADIOLOGY & ADDITIONAL STUDIES:    IMPRESSION AND PLAN:  CAD   Ischemic cardiomyopathy   HFrEF exacerbation  - Management as per primary team  - Continuel Torsemide, follow I/O and renal function  - Repeat  Digoxin level  - Will follow     
  Patient is a 67y old  Male who presents with a chief complaint of CHF (24 Feb 2023 14:51)    HPI:  67-year-old male w/ HFrEF 15-20% s/p ICD, decreased RV function, mild MR/TR, DM type I w/ neuropathy and hx hypoglycemia (3 episodes in past 2 weeks), DL, HTN, CAD, hx MI, s/p CABG, s/p stent (2018), hx in-stent thrombosis while on Plavix, PAD s/p 3 LLE stents (2 months prior to admission), TIAGO (needs CPAP auto-regulating pressure 10-16, patient doesn't use it due to claustrophobia), Psoriasis presenting w/ worsening shortness of breath over the past week, increased Lasix at home today. Can't measure weight at home due to right AKA. Denies fever, chills, N/V, cough, sputum, sick contacts, CP. Complains of b/l hand excruciating pain w/o benefit of topical steroids. Patient saw outpatient dermatologist who wasn't sure about Dx but suspected contact dermatitis and prescribed the steroid cream (Triamcinolone). Patient has erythema, tenderness, swelling. Symptoms are causing distress as the patient lost his dexterity because of them.    PMHx PVCs, Osteopenia, right AKA, hx septic arthritis, hx MRSA bacteremia, left ankle Sx, hypothyroidism, MDD/Anxiety, RLS, hx cholecystits s/p cholecystostomy (drain removed, fistula still in place)esophagitis, diverticulosis w/ chung diverticulitis (23 Feb 2023 02:03)    PAST MEDICAL & SURGICAL HISTORY:  Status post percutaneous transluminal coronary angioplasty  2 stents      Atherosclerosis of coronary artery  CAD (coronary artery disease)      Osteoarthritis      HLD (hyperlipidemia)      Diabetes  on insulin pump      CHF (congestive heart failure)  EF ~ 25%      History of celiac disease      Diverticulitis      STEMI (ST elevation myocardial infarction)      Diabetic neuropathy  Hands and Feet      Anxiety and depression      Other postprocedural status  Fixation hardware in foot LEFT      Stented coronary artery  10/18 heart attack  INFERIOR WALL MI      S/P CABG x 1  2018      S/P TKR (total knee replacement), right  with infection Mrsa   per pt he was cleared from MRSA infection      Surgery, elective  right knee wound debridement      History of open reduction and internal fixation (ORIF) procedure  right hip      H/O shoulder surgery  right      AICD (automatic cardioverter/defibrillator) present  [facility]      Cholecystostomy care  drain inserted 2020 &amp; removed 4 months ago      History of tonsillectomy      History of hip replacement, total, right      Elective surgery  plastic surgery Left shin    patient seen and examined independently on morning rounds for the first time today, chart reviewed and discussed with the medicine resident and on interdisciplinary rounds.    this morning hypoglycemic (fs 40's)---given juice and amp of D50- patient was intermittently not compliant with medications at home (including lasix which he was only taking as needed over last few weeks)      Vital Signs Last 24 Hrs  T(C): 36.7 (24 Feb 2023 15:29), Max: 36.7 (24 Feb 2023 15:29)  T(F): 98 (24 Feb 2023 15:29), Max: 98 (24 Feb 2023 15:29)  HR: 86 (24 Feb 2023 15:29) (80 - 88)  BP: 108/60 (24 Feb 2023 15:29) (108/60 - 118/59)  BP(mean): --  RR: 20 (24 Feb 2023 11:22) (18 - 20)  SpO2: 94% (24 Feb 2023 15:29) (94% - 100%)    Parameters below as of 24 Feb 2023 11:22  Patient On (Oxygen Delivery Method): room air    PE:  GEN-NAD, AAOx3  PULM- fair air entry  CVS- +s1/s2 RRR no murmurs  GI- soft NT ND +bs, no rebound, no guarding  EXT- R AKA stump C/D/I- LLE +edema and ulcerations (including at left knee, left lateral thigh and metatarsal amputation stump heads- bilateral hand edema with ulcerations (including wrist and digits)-patient is left handed with limited mobility                        10.5   7.35  )-----------( 294      ( 23 Feb 2023 17:04 )             35.9     02-23    137  |  100  |  36<H>  ----------------------------<  81  4.7   |  23  |  0.9    Ca    8.7      23 Feb 2023 17:04  Phos  4.5     02-23  Mg     1.7     02-23    TPro  6.9  /  Alb  3.3<L>  /  TBili  0.7  /  DBili  x   /  AST  24  /  ALT  33  /  AlkPhos  164<H>  02-23    CARDIAC MARKERS ( 23 Feb 2023 17:04 )  x     / x     / 73 U/L / x     / x                MEDICATIONS  (STANDING):  aspirin enteric coated 81 milliGRAM(s) Oral daily  bacitracin   Ointment 1 Application(s) Topical two times a day  clobetasol 0.05% Cream 1 Application(s) Topical two times a day  dextrose 5%. 1000 milliLiter(s) (50 mL/Hr) IV Continuous <Continuous>  dextrose 5%. 1000 milliLiter(s) (100 mL/Hr) IV Continuous <Continuous>  dextrose 50% Injectable 25 Gram(s) IV Push once  dextrose 50% Injectable 12.5 Gram(s) IV Push once  dextrose 50% Injectable 25 Gram(s) IV Push once  diazepam    Tablet 1 milliGRAM(s) Oral at bedtime  digoxin     Tablet 125 MICROGram(s) Oral daily  DULoxetine 30 milliGRAM(s) Oral <User Schedule>  enoxaparin Injectable 30 milliGRAM(s) SubCutaneous every 24 hours  furosemide   Injectable 40 milliGRAM(s) IV Push two times a day  glucagon  Injectable 1 milliGRAM(s) IntraMuscular once  insulin glargine Injectable (LANTUS) 16 Unit(s) SubCutaneous every morning  insulin glargine Injectable (LANTUS) 10 Unit(s) SubCutaneous at bedtime  insulin lispro (ADMELOG) corrective regimen sliding scale   SubCutaneous three times a day before meals  levothyroxine 50 MICROGram(s) Oral <User Schedule>  levothyroxine 25 MICROGram(s) Oral <User Schedule>  metoprolol succinate ER 25 milliGRAM(s) Oral daily  multivitamin 1 Tablet(s) Oral daily  pantoprazole    Tablet 40 milliGRAM(s) Oral before breakfast  prasugrel 10 milliGRAM(s) Oral daily  spironolactone 25 milliGRAM(s) Oral daily

## 2023-03-01 NOTE — DISCHARGE NOTE PROVIDER - ATTENDING DISCHARGE PHYSICAL EXAMINATION:
Patient seen at bedside. discussed in length with family. Patient wants to be discharged today and does not want to wait for [facility]. he wants to do work up as outpatient. Patient was advised to follow up with PCP with repeat blood work including liver enzymes. and OP usg if no improvement. patient understood and agreed with plan. patient does not have abdominal; pain or any other symptoms. Patient to follow up with cardiology and specialist as scheduled. cardiology cleared patient on discharge and recommended torsemide 40 mg daily. patient in agreement with discharge planning.

## 2023-03-01 NOTE — PROGRESS NOTE ADULT - ASSESSMENT
68 y/o man with PMH of HFrEF 15-20% s/p ICD, decreased RV function, mild MR/TR, DM type I w/ neuropathy and hx hypoglycemia (3 episodes in past 2 weeks), DL, HTN, CAD s/p MI, s/p CABG, s/p stent (2018), hx in-stent thrombosis while on Plavix, PAD s/p 3 LLE stents (2 months prior to admission), TIAGO (needs CPAP auto-regulating pressure 10-16, patient doesn't use it due to claustrophobia), Psoriasis, s/p right AKA, MRSA bacteremia, left ankle surgery with fixation hardware and hypothyroid and admission last month for recurrent cellulitis complicated by acute over chronic CHF and hypoglycemia now presents with worsening shortness of breath over the past week (pt unable to weight himself due to right AKA). Previous notes reviewed by me.    #Acute over chronic HFrEF (severely reduced EF on JOHN PAUL in 2022), s/p AICD  -EKG: ventricular pacing  -pro BNP 9K  -on lasix 40mg IV q12hr without improvement; now on torsemide 40mg bid with good urine out pt.  -monitor weights in bed  -I's and O's, fluid restriction, low sodium diet  -continue metoprolol   -allergy to ACE and Entresto  -telemetry  -s/p AICD interrogation on 2/24: 7 episodes on NSVT (self limiting) - outpt f/u with EP recommended  -check lactate and LFTs  -cardiology following - Dr. Nelson  - Digoxin level elevated (2.2); Digoxin held  - Cardio recs appreciate D/C home on Torsemide / Continue to hold digoxin  - Will follow out pt     #Elevated LFTs  - AST 49; AST 58; Alk Phos 195; T.Bili 1.5  - f/u RUQ sono; if unequivocal, can have elevated LFTs work up as out pt.    #CAD s/p MI, PCI, CABG  -on ASA/prasugrel, metoprolol    #DM type 1  -episodes of hypoglycemia  - lantus dose reduced and monitor FS  - A1C 9.2    #Multiple skin lesions of wrist and hands  -h/o psoriasis  -seen by derm as outpt and given dx of contact dermatitis - on steroid cream without much improvement  -rule out inflammatory arthritis - ? gout on xray of hands  -ESR 15, CRP 32, blood cultures negative, procal 0.09  -pt afebrile and WBC WNL  -arterial duplex of UE: normal blood flow  -anti SSA, SSB, Scl and RNP negative  -f/u pending rheum w/u including OBED, Aldolase, ANCA, RF and antiCCP    #PVD s/p intervention  -left LE with chronic skin changes and cool to touch  -wound care  -arterial duplex of Left LE: normal flow  -evaluated by podiatry    #Hypothyroid   - c/w synthroid    #Hyperkalemia  - now on torsemide - repeat K level today and if >5.5, give lokelma 10mg PO x 1   - holding spironolactone     # MOISES - likely CRS  -diuresis now with torsemide  -monitor urine output  -avoid nephrotoxic meds    #DVT prophylaxis   - on lovenox - change to heparin SQ if MOISES not improving    full code  Pending: cleared by cardiology for d/c; pending RUQ sono for elevated LFTs  Disposition: from home

## 2023-03-01 NOTE — DISCHARGE NOTE PROVIDER - CARE PROVIDERS DIRECT ADDRESSES
,DirectAddress_Unknown ,DirectAddress_Unknown,[email or web address] ,DirectAddress_Unknown,[email or web address],DirectAddress_Unknown

## 2023-03-01 NOTE — DISCHARGE NOTE PROVIDER - HOSPITAL COURSE
67-year-old male w/ HFrEF 15-20% s/p ICD, decreased RV function, mild MR/TR, DM type I w/ neuropathy and hx hypoglycemia (3 episodes in past 2 weeks), DL, HTN, CAD, hx MI, s/p CABG, s/p stent (2018), hx in-stent thrombosis while on Plavix, PAD s/p 3 LLE stents (2 months prior to admission), TIAGO (needs CPAP auto-regulating pressure 10-16, patient doesn't use it due to claustrophobia), Psoriasis presenting w/ worsening shortness of breath over the past week, increased Lasix at home today. Can't measure weight at home due to right AKA. Denies fever, chills, N/V, cough, sputum, sick contacts, CP. Complains of b/l hand excruciating pain w/o benefit of topical steroids. Patient saw outpatient dermatologist who wasn't sure about Dx but suspected contact dermatitis and prescribed the steroid cream (Triamcinolone). Patient has erythema, tenderness, swelling. Symptoms are causing distress as the patient lost his dexterity because of them.    PMHx PVCs, Osteopenia, right AKA, hx septic arthritis, hx MRSA bacteremia, left ankle Sx, hypothyroidism, MDD/Anxiety, RLS, hx cholecystits s/p cholecystostomy (drain removed, fistula still in place)esophagitis, diverticulosis w/ chung diverticulitis    #Acute over chronic HFrEF (severely reduced EF on JOHN PAUL in 2022), s/p AICD  -EKG: ventricular pacing  -pro BNP 9K  -on lasix 40mg IV q12hr without improvement; now on torsemide 40mg bid with good urine out pt.  -monitor weights in bed  -I's and O's, fluid restriction, low sodium diet  -continue metoprolol   -allergy to ACE and Entresto  -telemetry  -s/p AICD interrogation on 2/24: 7 episodes on NSVT (self limiting) - outpt f/u with EP recommended  -check lactate and LFTs  -cardiology following - Dr. Nelson  - Digoxin level elevated (2.2); Digoxin held  - Cardio recs appreciate D/C home on Torsemide / Continue to hold digoxin  - Will follow out pt     #Elevated LFTs  - AST 49; AST 58; Alk Phos 195; T.Bili 1.5  - f/u RUQ sono; if unequivocal, can have elevated LFTs work up as out pt.    #CAD s/p MI, PCI, CABG  -on ASA/prasugrel, metoprolol    #DM type 1  -episodes of hypoglycemia  - lantus dose reduced and monitor FS  - A1C 9.2    #Multiple skin lesions of wrist and hands  -h/o psoriasis  -seen by derm as outpt and given dx of contact dermatitis - on steroid cream without much improvement  -rule out inflammatory arthritis - ? gout on xray of hands  -ESR 15, CRP 32, blood cultures negative, procal 0.09  -pt afebrile and WBC WNL  -arterial duplex of UE: normal blood flow  -anti SSA, SSB, Scl and RNP negative  -f/u pending rheum w/u including OBED, Aldolase, ANCA, RF and antiCCP    #PVD s/p intervention  -left LE with chronic skin changes and cool to touch  -wound care  -arterial duplex of Left LE: normal flow  -evaluated by podiatry    #Hypothyroid   - c/w synthroid    #Hyperkalemia  - now on torsemide - repeat K level today and if >5.5, give lokelma 10mg PO x 1   - holding spironolactone     # MOISES - likely CRS  -diuresis now with torsemide  -monitor urine output  -avoid nephrotoxic meds    #DVT prophylaxis   - on lovenox - change to heparin SQ if MOISES not improving   67-year-old male w/ HFrEF 15-20% s/p ICD, decreased RV function, mild MR/TR, DM type I w/ neuropathy and hx hypoglycemia (3 episodes in past 2 weeks), DL, HTN, CAD, hx MI, s/p CABG, s/p stent (2018), hx in-stent thrombosis while on Plavix, PAD s/p 3 LLE stents (2 months prior to admission), TIAGO (needs CPAP auto-regulating pressure 10-16, patient doesn't use it due to claustrophobia), Psoriasis presenting w/ worsening shortness of breath over the past week, increased Lasix at home today. Can't measure weight at home due to right AKA. Denies fever, chills, N/V, cough, sputum, sick contacts, CP. Complains of b/l hand excruciating pain w/o benefit of topical steroids. Patient saw outpatient dermatologist who wasn't sure about Dx but suspected contact dermatitis and prescribed the steroid cream (Triamcinolone). Patient has erythema, tenderness, swelling. Symptoms are causing distress as the patient lost his dexterity because of them.    PMHx PVCs, Osteopenia, right AKA, hx septic arthritis, hx MRSA bacteremia, left ankle Sx, hypothyroidism, MDD/Anxiety, RLS, hx cholecystits s/p cholecystostomy (drain removed, fistula still in place)esophagitis, diverticulosis w/ chung diverticulitis    #Acute over chronic HFrEF (severely reduced EF on JOHN PAUL in 2022), s/p AICD  -EKG: ventricular pacing  -pro BNP 9K  -on lasix 40mg IV q12hr without improvement; now on torsemide 40mg bid with good urine out pt.  -monitor weights in bed  -I's and O's, fluid restriction, low sodium diet  -continue metoprolol   -allergy to ACE and Entresto  -telemetry  -s/p AICD interrogation on 2/24: 7 episodes on NSVT (self limiting) - outpt f/u with EP recommended  -check lactate and LFTs  -cardiology following - Dr. Nelson  - Digoxin level elevated (2.2); Digoxin held  - Cardio recs appreciate D/C home on Torsemide / Continue to hold digoxin  - Will follow out pt     #Elevated LFTs  - AST 49; AST 58; Alk Phos 195; T.Bili 1.5  - f/u RUQ sono; if unequivocal, can have elevated LFTs work up as out pt.    #CAD s/p MI, PCI, CABG  -on ASA/prasugrel, metoprolol    #DM type 1  -episodes of hypoglycemia  - lantus dose reduced and monitor FS  - A1C 9.2    #Multiple skin lesions of wrist and hands  -h/o psoriasis  -seen by derm as outpt and given dx of contact dermatitis - on steroid cream without much improvement  -rule out inflammatory arthritis - ? gout on xray of hands  -ESR 15, CRP 32, blood cultures negative, procal 0.09  -pt afebrile and WBC WNL  -arterial duplex of UE: normal blood flow  -anti SSA, SSB, Scl and RNP negative  -f/u pending rheum w/u including OBED, Aldolase, ANCA, RF and antiCCP    #PVD s/p intervention  -left LE with chronic skin changes and cool to touch  -wound care  -arterial duplex of Left LE: normal flow  -evaluated by podiatry    #Hypothyroid   - c/w synthroid    #Hyperkalemia  - now on torsemide - repeat K level today and if >5.5, give lokelma 10mg PO x 1   - holding spironolactone     # MOISES - likely CRS  -diuresis now with torsemide  -monitor urine output  -avoid nephrotoxic meds    #DVT prophylaxis   - on lovenox - change to heparin SQ if MOISES not improving      Pt is medically cleared for discharge.

## 2023-03-01 NOTE — DISCHARGE NOTE PROVIDER - DETAILS OF MALNUTRITION DIAGNOSIS/DIAGNOSES
This patient has been assessed with a concern for Malnutrition and was treated during this hospitalization for the following Nutrition diagnosis/diagnoses:     -  02/25/2023: Severe protein-calorie malnutrition

## 2023-03-01 NOTE — DISCHARGE NOTE NURSING/CASE MANAGEMENT/SOCIAL WORK - PATIENT PORTAL LINK FT
You can access the FollowMyHealth Patient Portal offered by [facility] by registering at the following website: [email or web address]. By joining [employer]’s FollowMyHealth portal, you will also be able to view your health information using other applications (apps) compatible with our system.

## 2023-03-01 NOTE — CHART NOTE - NSCHARTNOTEFT_GEN_A_CORE
Device: BiV ICD - [facility]    Indication: HF exac  Interrogation complete. Device functioning normally.     Mode: -110  Battery:  1.7-2 years      Events: 7 episodes NSVT noted, unable to evaluate all EGMs, Self limiting, occurred between 8/8/2022- 1/28/2023, 4-10 sec duration. One episode read as VF on 5/3/2022- appears to be external noise on lead.     Recommendations: Routine f/u as outpatient with Dr. Mejia in 1 month    EPS 1810
Registered Dietitian Follow-Up  Patient Profile Reviewed                           Yes [x]   No []  Nutrition History Previously Obtained        Yes [x]  No []      Pertinent Medical Interventions:  #DM type 1  -episodes of hypoglycemia  - lantus dose reduced and monitor FS  - A1C 9.2    Nutrition Interval History:   patient with good appetite/ PO intake, improved now consuming % of meal trays on average   Nutrient Intake: Patient meeting >85% of estimated energy needs in-house     Diet order:   Diet, Regular:   Gluten-Gliadin Restricted (23 @ 07:39) [Active]  Diet, Consistent Carbohydrate w/Evening Snack:   High Fiber  1500mL Fluid Restriction ([id number])  Low Sodium  Supplement Feeding Modality:  Oral  Glucerna Shake Cans or Servings Per Day:  1       Frequency:  Three Times a day (23 @ 22:19) [Pending Verification By Attending]  Diet, Consistent Carbohydrate w/Evening Snack:   High Fiber  Low Sodium  Supplement Feeding Modality:  Oral  Ensure Plus High Protein Cans or Servings Per Day:  1       Frequency:  Three Times a day (23 @ 22:12) [Pending Verification By Attending]    Anthropometrics:  185.4 (23 @ 21:42)  66.7kg  185.4cm  OTHER WEIGHTS:   IBW 84kg  Daily Weight in k (-), Weight in k (-25)  111kg 10/22/22 prior to amputation    MEDICATIONS  (STANDING):  aspirin enteric coated 81 milliGRAM(s) Oral daily  bacitracin   Ointment 1 Application(s) Topical two times a day  clobetasol 0.05% Cream 1 Application(s) Topical two times a day  dextrose 5%. 1000 milliLiter(s) (100 mL/Hr) IV Continuous <Continuous>  dextrose 5%. 1000 milliLiter(s) (50 mL/Hr) IV Continuous <Continuous>  dextrose 50% Injectable 25 Gram(s) IV Push once  dextrose 50% Injectable 12.5 Gram(s) IV Push once  dextrose 50% Injectable 25 Gram(s) IV Push once  diazepam    Tablet 1 milliGRAM(s) Oral at bedtime  DULoxetine 30 milliGRAM(s) Oral <User Schedule>  enoxaparin Injectable 30 milliGRAM(s) SubCutaneous every 24 hours  glucagon  Injectable 1 milliGRAM(s) IntraMuscular once  insulin glargine Injectable (LANTUS) 5 Unit(s) SubCutaneous at bedtime  insulin lispro (ADMELOG) corrective regimen sliding scale   SubCutaneous three times a day before meals  levothyroxine 50 MICROGram(s) Oral <User Schedule>  levothyroxine 25 MICROGram(s) Oral <User Schedule>  metoprolol succinate ER 25 milliGRAM(s) Oral daily  multivitamin 1 Tablet(s) Oral daily  pantoprazole    Tablet 40 milliGRAM(s) Oral before breakfast  prasugrel 10 milliGRAM(s) Oral daily  torsemide 40 milliGRAM(s) Oral two times a day    MEDICATIONS  (PRN):  acetaminophen     Tablet .. 650 milliGRAM(s) Oral every 6 hours PRN Temp greater or equal to 38C (100.4F), Mild Pain (1 - 3)  aluminum hydroxide/magnesium hydroxide/simethicone Suspension 30 milliLiter(s) Oral every 4 hours PRN Dyspepsia  dextrose Oral Gel 15 Gram(s) Oral once PRN Blood Glucose LESS THAN 70 milliGRAM(s)/deciliter  melatonin 3 milliGRAM(s) Oral at bedtime PRN Insomnia  ondansetron Injectable 4 milliGRAM(s) IV Push every 8 hours PRN Nausea and/or Vomiting  oxyCODONE    IR 10 milliGRAM(s) Oral every 6 hours PRN Moderate Pain (4 - 6)    Pertinent Labs:  @ 06:06: Na 135, BUN 49<H>, Cr 1.3, BG 98, K+ 4.5, Phos --, Mg --, Alk Phos 195<H>, ALT/SGPT 58<H>, AST/SGOT 49<H>, HbA1c --   @ 17:48: Na 137, BUN 57<H>, Cr 1.5, <H>, K+ 4.9, Phos --, Mg 1.9, Alk Phos 182<H>, ALT/SGPT 53<H>, AST/SGOT 51<H>, HbA1c --    Finger Sticks:  POCT Blood Glucose.: 142 mg/dL ( @ 11:43)  POCT Blood Glucose.: 108 mg/dL ( @ 07:21)  POCT Blood Glucose.: 116 mg/dL ( @ 21:31)  POCT Blood Glucose.: 156 mg/dL ( @ 16:24)    Physical Findings:  - Cognition: A&Ox4   - GI function: WNL   - Tubes: n/a  - Oral/Mouth cavity: WNL   - Skin: Left:; shin wound  - Edema: left wrist; right wrist; left hand; right hand 1+     Nutrition Requirements: with consideration for age, weight, BMI  Weight Used:66.7kg lowest weight   Estimated Calorie Needs: [id number] x AF 1.3-1.0=2827-3459ecem/day -Due to PCM  Estimated Protein Needs: 87-107grams/day (1.3-1.6grams/kg of admit weight) -Due to PCM  Estimated Fluid Needs: 1334-1668mL/day (20-25mL/kg of admit weight) -Due to CHF    [x] Previous Nutrition Diagnosis:            [x] Ongoing          [] Resolved  #1 Malnutrition  Goal/Expected Outcome: meet >/=75% est energy needs within 7-10 days    Nutrition Intervention: Meals and Snacks, Medical Food Supplement, Vitamin Supplement, Nutrition Related Medication, Coordination of Care  Indicator/Monitoring:  Monitor diet order, energy intake, food and nutrient intake, body composition, weight    Recommendations:  - low sodium; consistent carbohydrate (evening snack); high fiber; 1500mL  - continue MV       Zak Escoto, #[id number] or via TEAMS  Patient is at low Risk, follow up x 7-10days
the patient stated he feels better , his wife and daughter at bedside   66 yo M with hx of HFrEF (LVEF < 15%) s/p AICD, HTN, HLD, DM II, CAD/MI s/p CABG, peripheral neuropathy, right BKA presents to ED for 1 weeks history of SOB and LLE swelling.     On exam General awake, alert NAD, Lungs clear to ausculations b/l +AICD , Heart regular ryhthm, Abdomen: soft, non tender non distended  Ext: R AKA , left legs chronic wounds with dressing over the legs an foot   upper ext; b/l extensor surface erythematous rashes over the hands and forearm with areas of superficial ulcers  with arthritic changes ovetr the hand     Chronic skin lesions   - Ddx: Contact dermatitis vs. connective tissue disorder  (r/o infection) vs ischemic ulcers   -c/w ancef for now   get blood cultures and check procalcitonin and MRSA nasal   - c/w topical steroid for now. ( patient has been taking local steroids )   - check OBED, CK, Aldolase, ESR, CRP, TSH, anti-scl, anti-sjogren , ANCA, RF and antCCP,   - Dermatology consult for possible skin bx.   -Rheumatology consult   XRAYs both hands   get arterial duplex b/l UE and Left lower ext   burn team and podiatry consult     Acute HFrEF exacerbation   today improved   - CXR shows Bilateral opacifications and effusions, right greater than left.   - c/w IV lasix 40mg BID  - monitor daily weight, strict I & O, Low Na diet with fluid restriction.   - c/w home meds  Pt has allergy to ACEI and Entresto   get Echo and consider cardio eval   - monitor on Telemetry.   AICD interrogation as per the family request     CAD/MI s/p CABG - c/w home med  troponin 0.03 ( improving from last month) , no chest pain   HTN/HLD - c/w home med  DM II - monitor FS AC HS. Insulin regimen.   Right BKA - follow up outpatient.   GERD - PPI   MDD/Anxiety - c/w home med    GI/ DVT ppx     discussed with the patient and family as bedside

## 2023-03-03 NOTE — PLAN
[FreeTextEntry1] : DM:c/w insulin sliding scale. monitor fs. f/u endocrine.\par \par CHF:c/w torsemide 20mg daily per cardiology. c/w metoprolol/spironolactone. monitor for signs of water retention. low salt/sodium diet. fluid restriction. f/u cardio.\par \par Hypothyroid:c/w synthroid. f/u endocrine. \par \par Instructed wife to give TCM NP information to VNS and to contact NP with any issues or concerns.

## 2023-03-03 NOTE — HISTORY OF PRESENT ILLNESS
[Home] : at home, [unfilled] , at the time of the visit. [Other Location: e.g. Home (Enter Location, City,State)___] : at [unfilled] [Verbal consent obtained from patient] : the patient, [unfilled] [Spouse] : spouse [FreeTextEntry1] : F/U hospital discharge for CHF exacerbation. [de-identified] : Patient is a 67 year old male enrolled in the [facility] TCM program s/p a recent discharge from [facility] 2/22-3/1 for CHF. PMH HFrEF 15-20% s/p ICD, decreased RV function, mild MR/TR, DM type I w/ neuropathy and hx hypoglycemia (3 episodes in past 2 weeks), DL, HTN, CAD, hx MI, s/p CABG, s/p stent (2018), hx in-stent thrombosis while on Plavix, PAD s/p 3 LLE stents (2 months prior to admission), TIAGO (needs CPAP auto-regulating pressure 10-16, patient doesn't use it due to claustrophobia), Psoriasis. Patient was diuresed and sent home with [facility]. HCS in place. Upon arrival patient alert in nad, denies cp, sob, edema, fever, chills, n/v/d, cough. Reports increased urination due to water pill. F/U appointments with cardio 3/8, endocrine 3/2, eps 4/10, rheumatology 4/3. Patient was contacted by NICANOR Guardado from TCM and medication reconciliation was done with in 48 hours of discharge 3/2.\par \par Copied from SCM:\par Hospital Course: 67-year-old male w/ HFrEF 15-20% s/p ICD, decreased RV function, mild MR/TR, DM type I w/ neuropathy and hx hypoglycemia (3 episodes in past 2 weeks), DL, HTN, CAD, hx MI, s/p CABG, s/p stent (2018), hx in-stent thrombosis while on Plavix, PAD s/p 3 LLE stents (2 months prior to admission), TIAGO (needs CPAP auto-regulating pressure 10-16, patient doesn't use it due to claustrophobia), Psoriasis presenting w/ worsening shortness of breath over the past week, increased Lasix at home today. Can't measure weight at home due to right AKA. Denies fever, chills, N/V, cough, sputum, sick contacts, CP. Complains of b/l hand excruciating pain w/o benefit of topical steroids. Patient saw outpatient dermatologist who wasn't sure about Dx but suspected contact dermatitis and prescribed the steroid cream (Triamcinolone). Patient has erythema, tenderness, swelling. Symptoms are causing distress as the patient lost his dexterity because of them.\par PMHx PVCs, Osteopenia, right AKA, hx septic arthritis, hx MRSA bacteremia, left ankle Sx, hypothyroidism, MDD/Anxiety, RLS, hx cholecystits s/p cholecystostomy (drain removed, fistula still in place)esophagitis, diverticulosis w/ chung diverticulitis\par #Acute over chronic HFrEF (severely reduced EF on JOHN PAUL in 2022), s/p AICD\par -EKG: ventricular pacing\par -pro BNP 9K\par -on lasix 40mg IV q12hr without improvement; now on torsemide 40mg bid with good urine out pt.\par -monitor weights in bed\par -I's and O's, fluid restriction, low sodium diet\par -continue metoprolol \par -allergy to ACE and Entresto\par -telemetry\par -s/p AICD interrogation on 2/24: 7 episodes on NSVT (self limiting) - outpt f/u with EP recommended\par -check lactate and LFTs\par -cardiology following - Dr. Nelson\par - Digoxin level elevated (2.2); Digoxin held\par - Cardio recs appreciate D/C home on Torsemide / Continue to hold digoxin\par - Will follow out pt \par #Elevated LFTs\par - AST 49; AST 58; Alk Phos 195; T.Bili 1.5\par - f/u RUQ sono; if unequivocal, can have elevated LFTs work up as out pt.\par #CAD s/p MI, PCI, CABG\par -on ASA/prasugrel, metoprolol\par #DM type 1\par -episodes of hypoglycemia\par - lantus dose reduced and monitor FS\par - A1C 9.2\par #Multiple skin lesions of wrist and hands\par -h/o psoriasis\par -seen by derm as outpt and given dx of contact dermatitis - on steroid cream without much improvement\par -rule out inflammatory arthritis - ? gout on xray of hands\par -ESR 15, CRP 32, blood cultures negative, procal 0.09\par -pt afebrile and WBC WNL\par -arterial duplex of UE: normal blood flow\par -anti SSA, SSB, Scl and RNP negative\par -f/u pending rheum w/u including OBED, Aldolase, ANCA, RF and antiCCP\par #PVD s/p intervention\par -left LE with chronic skin changes and cool to touch\par -wound care\par -arterial duplex of Left LE: normal flow\par -evaluated by podiatry\par #Hypothyroid \par - c/w synthroid\par #Hyperkalemia\par - now on torsemide - repeat K level today and if >5.5, give lokelma 10mg PO x 1 \par - holding spironolactone \par # MOISES - likely CRS\par -diuresis now with torsemide\par -monitor urine output\par -avoid nephrotoxic meds\par #DVT prophylaxis \par - on lovenox - change to heparin SQ if MOISES not improving\par Pt is medically cleared for discharge.\par \par

## 2023-03-03 NOTE — PHYSICAL EXAM
[No Acute Distress] : no acute distress [Well Developed] : well developed [Well-Appearing] : well-appearing [Normal Sclera/Conjunctiva] : normal sclera/conjunctiva [Normal Outer Ear/Nose] : the outer ears and nose were normal in appearance [No Respiratory Distress] : no respiratory distress  [No Edema] : there was no peripheral edema [Normal Affect] : the affect was normal [Alert and Oriented x3] : oriented to person, place, and time [Normal Mood] : the mood was normal [de-identified] : left aka

## 2023-03-03 NOTE — ASSESSMENT
[FreeTextEntry1] : Patient is a 67 year old male enrolled in the \[facility]\"" TCM program s/p a recent discharge from [facility] 2/22-3/1 for CHF. PMH HFrEF 15-20% s/p ICD, decreased RV function, mild MR/TR, DM type I w/ neuropathy and hx hypoglycemia (3 episodes in past 2 weeks), DL, HTN, CAD, hx MI, s/p CABG, s/p stent (2018), hx in-stent thrombosis while on Plavix, PAD s/p 3 LLE stents (2 months prior to admission), TIAGO (needs CPAP auto-regulating pressure 10-16, patient doesn't use it due to claustrophobia), Psoriasis. Patient was diuresed and sent home with [facility]. HCS in place. Upon arrival patient alert in nad, denies cp, sob, edema, fever, chills, n/v/d, cough. Reports increased urination due to water pill. F/U appointments with cardio 3/8, endocrine 3/2, eps 4/10, rheumatology 4/3. Patient was contacted by NICANOR Guardado from TCM and medication reconciliation was done with in 48 hours of discharge 3/2.

## 2023-03-03 NOTE — COUNSELING
[de-identified] : Pt was informed about CN’s role/ [facility] program and overview of transitional care reviewed with patient. In addition, yellow contact card was provided. Pt educated on topics of importance such as compliance with all provider visits, prescribed medication regimen, and low salt diet. Pt encouraged calling CN with any issues, concerns or questions, also educated to notify CN if experiencing CP, SOB , cough, increased mucus/phlegm production, abdominal discomfort/swelling, difficulty sleeping or lying flat, fever, chills, fatigue, weight gain of 2-3lbs in 24 hours or 5lbs in one week,  dizziness, lightheadedness, n/v/d/c, swelling to extremities and/or any signs of CHF exacerbation as reviewed. Reassurance provided. Will continue to monitor\par \par  [None] : None

## 2023-04-03 PROBLEM — R21 RASH: Status: ACTIVE | Noted: 2023-01-01

## 2023-04-03 NOTE — ASSESSMENT
[FreeTextEntry1] : Hand and wrist pain\par -Hand x-rays from February show right 3rd MCp erosion, left ulnar styloid erosion. His symptoms don't fully match up with rheumatoid arthritis but he hand pain, worse in the morning with stiffness for 30 minutes along with his x-ray findings this should be considered. He was diagnosed with psoriasis originally psoriatic arthritis is in the differential, along with degenerative arthritis. He has no other manifestations of psoriatic arthritis.\par -Check wrist x-rays, RF, CCP, ESR, CRP, hepatitis B and C, TB\par -Start prednisone 15 mg x 1 week, 10 mg until follow up monitor blood sugars if elevated decrease or stop steroids\par -Refer to dermatology for rash\par -F/u 3 weeks

## 2023-04-03 NOTE — HISTORY OF PRESENT ILLNESS
[FreeTextEntry1] : He developed a rash 3 months ago on his hands went to see Dr. Prieto and diagnosed with psoriasis diagnosed steroid creams. His rash has been coming and going. \par \par He reports pain in his hands and wrists since before February "at a low level" and worse over the last 1 month. Pain is constant, he can't drive a car due to pain turning the steering wheel. Reports wrists are immobile. Inactivity makes joint pain better. Any movement of the wrists cause pain. His palms don't cause pain but his dorsal hand is painful. Initially topical steroids helped his pains. He went to pain management and prescribed oxycodone that helps on and off. Morning is the worst pain, stiffness is for 30 minutes in the morning,  Hands and wrists get swollen. Ibuprofen helped in the beginning but switched to motrin due to lost efficacy. Motrin helps occasionally. \par \par He has a brown painful lesion with discharge on his left thigh\par \par He had a R TKA, developed infection 3 days later with dehiscence and went to the OR 4-5x for this but kept re-developing the infection and eventually had a BKA. \par \par Denies uveitis, dactylitis, enthesitis, inflammatory bowel disease, low back stiffness.

## 2023-04-03 NOTE — PHYSICAL EXAM
[General Appearance - Alert] : alert [General Appearance - In No Acute Distress] : in no acute distress [Sclera] : the sclera and conjunctiva were normal [PERRL With Normal Accommodation] : pupils were equal in size, round, and reactive to light [Extraocular Movements] : extraocular movements were intact [Outer Ear] : the ears and nose were normal in appearance [Oropharynx] : the oropharynx was normal [Neck Appearance] : the appearance of the neck was normal [Neck Cervical Mass (___cm)] : no neck mass was observed [Jugular Venous Distention Increased] : there was no jugular-venous distention [Thyroid Diffuse Enlargement] : the thyroid was not enlarged [Thyroid Nodule] : there were no palpable thyroid nodules [Auscultation Breath Sounds / Voice Sounds] : lungs were clear to auscultation bilaterally [Heart Rate And Rhythm] : heart rate was normal and rhythm regular [Heart Sounds] : normal S1 and S2 [Heart Sounds Gallop] : no gallops [Murmurs] : no murmurs [Heart Sounds Pericardial Friction Rub] : no pericardial rub [Bowel Sounds] : normal bowel sounds [Abdomen Soft] : soft [Abdomen Tenderness] : non-tender [] : no hepato-splenomegaly [Abdomen Mass (___ Cm)] : no abdominal mass palpated [Affect] : the affect was normal [Mood] : the mood was normal [FreeTextEntry1] : Erythematous rash dosum of hands and wrists extending proximally with ulcerated black lesion left ulnar styloid, right proximal wrist/distal forearm, right 3rd MCP, lesions are also dried purulence

## 2023-04-12 NOTE — OCCUPATIONAL THERAPY INITIAL EVALUATION ADULT - NS ASR FOLLOW COMMAND OT EVAL
Impression: Dry eye syndrome of bilateral lacrimal glands: H04.123. Plan: Educated pt on dry eye, the effects it can have on the eyes and VA, along with treatment options. Recommend OTC artificial tear use 2-4x daily w/ emphasis on QAM and QHS doses. Discussed possible improvement with thicker gel or ointment QHS. Recommend warm compress w/ lid massage. If symptoms continue/worsen will consider prescription drug therapy. pt tearful throughout session, distraught about d/c possibility to PINKY/able to follow multistep instructions/100% of the time

## 2023-04-13 NOTE — H&P ADULT - NSHPLABSRESULTS_GEN_ALL_CORE
cbc                        10.3   9.33  )-----------( 324      ( 12 Mar 2021 05:50 )             33.9     03-12    136  |  98  |  27<H>  ----------------------------<  195<H>  4.3   |  27  |  1.2    Ca    9.6      12 Mar 2021 05:50    TPro  8.5<H>  /  Alb  4.1  /  TBili  0.5  /  DBili  x   /  AST  25  /  ALT  28  /  AlkPhos  142<H>  03-12    < from: CT Head No Cont (03.12.21 @ 06:51) >    Findings:    The study is limited due to motion artifact.    The ventricles and cortical sulci demonstrates stable mild/moderate atrophic changes.    There is no gross evidence of acute intracranial hemorrhage, extra-axial fluid collection or midline shift.    The visualized paranasal sinuses and mastoids are grossly clear.    IMPRESSION:    Motion limited study demonstrates no gross evidence of acute intracranial abnormality.
Her/She

## 2023-04-17 NOTE — DISCHARGE NOTE PROVIDER - PROVIDER RX CONTACT NUMBER
oriented to person, place and time , normal sensation , short and long term memory intact
[phone]

## 2023-04-24 PROBLEM — M19.90 INFLAMMATORY ARTHRITIS: Status: ACTIVE | Noted: 2023-01-01

## 2023-04-24 NOTE — ASSESSMENT
[FreeTextEntry1] : Inflammatory arthritis\par -Hand x-rays from February show right 3rd MCP erosion, left ulnar styloid erosion. He was diagnosed with psoriasis originally psoriatic arthritis is in the differential, along with degenerative arthritis. He has no other manifestations of psoriatic arthritis.\par -X-ray wrists suggest erosive disease\par -Check CMP today for elevated LFTs in March\par -Start methotrexate 6 tabs weekly and folic acid 1 mg daily if LFTs are normal\par -F/u in 6 weeks

## 2023-04-24 NOTE — HISTORY OF PRESENT ILLNESS
[FreeTextEntry1] : 4/24/23:\par He feels swelling improved on prednisone but pain is still present. His skin started to scab over and peel off with steroids. Prednisone was stopped due to hyperglycemia\par \par \par \par Initial visit:\par He developed a rash 3 months ago on his hands went to see Dr. Prieto and diagnosed with psoriasis diagnosed steroid creams. His rash has been coming and going. \par \par He reports pain in his hands and wrists since before February "at a low level" and worse over the last 1 month. Pain is constant, he can't drive a car due to pain turning the steering wheel. Reports wrists are immobile. Inactivity makes joint pain better. Any movement of the wrists cause pain. His palms don't cause pain but his dorsal hand is painful. Initially topical steroids helped his pains. He went to pain management and prescribed oxycodone that helps on and off. Morning is the worst pain, stiffness is for 30 minutes in the morning,  Hands and wrists get swollen. Ibuprofen helped in the beginning but switched to motrin due to lost efficacy. Motrin helps occasionally. \par \par He has a brown painful lesion with discharge on his left thigh\par \par He had a R TKA, developed infection 3 days later with dehiscence and went to the OR 4-5x for this but kept re-developing the infection and eventually had a BKA. \par \par Denies uveitis, dactylitis, enthesitis, inflammatory bowel disease, low back stiffness.

## 2023-04-24 NOTE — PHYSICAL EXAM
[General Appearance - Alert] : alert [General Appearance - In No Acute Distress] : in no acute distress [Sclera] : the sclera and conjunctiva were normal [PERRL With Normal Accommodation] : pupils were equal in size, round, and reactive to light [Extraocular Movements] : extraocular movements were intact [Outer Ear] : the ears and nose were normal in appearance [Oropharynx] : the oropharynx was normal [Neck Appearance] : the appearance of the neck was normal [Neck Cervical Mass (___cm)] : no neck mass was observed [Jugular Venous Distention Increased] : there was no jugular-venous distention [Thyroid Diffuse Enlargement] : the thyroid was not enlarged [Thyroid Nodule] : there were no palpable thyroid nodules [Auscultation Breath Sounds / Voice Sounds] : lungs were clear to auscultation bilaterally [Heart Rate And Rhythm] : heart rate was normal and rhythm regular [Heart Sounds] : normal S1 and S2 [Heart Sounds Gallop] : no gallops [Murmurs] : no murmurs [Heart Sounds Pericardial Friction Rub] : no pericardial rub [Bowel Sounds] : normal bowel sounds [Abdomen Soft] : soft [Abdomen Tenderness] : non-tender [] : no hepato-splenomegaly [Abdomen Mass (___ Cm)] : no abdominal mass palpated [Affect] : the affect was normal [Mood] : the mood was normal [FreeTextEntry1] : Erythematous rash dorsum of hands and wrists extending proximally with ulcerated black lesion left ulnar styloid, right proximal wrist/distal forearm, right 3rd MCP, lesions are also dried purulence

## 2023-04-25 NOTE — PHYSICAL EXAM
[General Appearance - Well Developed] : well developed [Normal Appearance] : normal appearance [Well Groomed] : well groomed [General Appearance - Well Nourished] : well nourished [No Deformities] : no deformities [General Appearance - In No Acute Distress] : no acute distress [Heart Rate And Rhythm] : heart rate and rhythm were normal [] : no respiratory distress [Left Infraclavicular] : left infraclavicular area [Clean] : clean [Dry] : dry [Well-Healed] : well-healed [Abdomen Soft] : soft [Cyanosis, Localized] : no localized cyanosis [Nail Clubbing] : no clubbing of the fingernails [FreeTextEntry1] : tachycardic

## 2023-04-25 NOTE — PROCEDURE
[No] : not [NSR] : normal sinus rhythm [See Device Printout] : See device printout [CRT-D] : Cardiac resynchronization therapy defibrillator [DDD] : DDD [Sensing Amplitude ___mv] : sensing amplitude was [unfilled] mv [Lead Imp:  ___ohms] : lead impedance was [unfilled] ohms [___V @] : [unfilled] V [___ ms] : [unfilled] ms [de-identified] : [employer] [de-identified] : Quadra [de-identified] : [id number] [de-identified] : 2/11/2020 [de-identified] : 60/110 [de-identified] : 2.5 years [de-identified] : AP/BP <1 / 98%\par Corvue fluctuates, currently elevated\par No events

## 2023-04-25 NOTE — REASON FOR VISIT
[New Patient Device Check] : is here today for a new patient device check visit for [Spouse] : spouse

## 2023-04-25 NOTE — REVIEW OF SYSTEMS
[Negative] : Heme/Lymph [FreeTextEntry2] : in wheelchair [FreeTextEntry5] : per HPI [de-identified] : per HPI

## 2023-04-25 NOTE — HISTORY OF PRESENT ILLNESS
[de-identified] : Cardio: Dr. Lopez\par Endocrinologist: Dr. 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

## 2023-04-25 NOTE — ASSESSMENT
[FreeTextEntry1] : # HFrEF EF 15-20% s/p CRT-D\par - Device interrogation normal. I interrogated and reprogrammed the device as described above. \par - No events\par - Corvue fluctuates frequently, no s/s fluid overload on exam\par - He is on remote and transmitting\par \par # HTN\par - BP well controlled\par - Continue metoprolol succinate 25mg QD\par - Cont Spirinolactone 25mg QD\par \par F/U in 6 months

## 2023-05-01 NOTE — H&P ADULT - NSHPPHYSICALEXAM_GEN_ALL_CORE
CONSTITUTIONAL: in no acute distress, afebrile  SKIN: Warm, dry; multiple psoriasis lesions, healing wounds to b/l hands and LLE  EYES: No conjunctival injection. EOMI  ENT: No nasal discharge; oropharynx nonerythematous; airway clear  NECK: Supple; non tender, No JVD  CARD:  Regular rate and rhythm; S1, S2 normal; no murmurs, gallops, or rubs  RESP: mild b/l basilar crackles; not tachypneic, no increased WOB  ABD: Soft NTND; No guarding or rebound tenderness  EXT: No clubbing or cyanosis.  1+ pitting edema;  RLE amputation;  No calf tenderness  NEURO: A&O x3, grossly unremarkable, no focal deficits

## 2023-05-01 NOTE — DISCHARGE NOTE PROVIDER - HOSPITAL COURSE
66 YO M w/a  PMH of HFrEF s/p AICD, DM1 w/ neuropathy and hx hypoglycemia, DLD, HTN, CAD s/p CABG/stent, PAD s/p LLE stent, TIAGO, Psoriasis, and hx of R-AKA who presents to the hospital w/ a c/o progressively worsening SOB for the past x 4 days. Denies LLE swelling. + orthopnea, - non-productive cough. Takes medications daily. Denies any fevers/chills, CP, palpitations, N/V/D, ABD pain, dysuria, headaches, or rashes.     In the ED, Chest X-ray shows B/L opacities (similar to previous). Started on Torsemide    FMHx:   -No family Hx of early cardiac death, CAD, asthma, or genetic disorders identified    Physical exam shows pt in NAD. VSS, afebrile, not hypoxic on RA. A&Ox3. Non-focal neuro exam. Muscle strength/sensation intact. Crackles noted in B/L lung fields. RRR, no M/G/R. ABD is soft and non-tender, normoactive BSs. No pitting edema noted on LLE; Right AKA present. B/L wrists w/ multiple skin ulcerations, granulation tissue present, ACE bandages in place. Labs and radiology as above.    Dyspnea due to acute on chronic HFrEF. IV diureses and transition to PO. Echo. Monitor daily weights, Is&Os, and diet/fluid restriction. BMP in the AM. Restart home meds. Cardio (Donal) consult  -Update at : Pt requesting to leave AMA. Understands risks of leaving prior to completing treatement include, but are not limited to, worsening dyspnea, ACS, and possible death. Pt understands these risks and teach back was performed. The pt states that he has a FU appointment w/ Dr. Mansfield tomorrow, pt does not need refill on his medications.     Microcytic anemia, below baseline. Pt denies any bleeding symptoms. Send anemia work-up. Replace PRN.   -Pt leaving AMA    Hx of DM1 w/ neuropathy and hx hypoglycemia, DLD, HTN, CAD s/p CABG/stent, PAD s/p LLE stent, TIAGO, Psoriasis, and hx of R-AKA. Restart home meds, except as stated above. DVT PPX. Inform PCP of pt's admission to hospital. My note supersedes the residents note.     Date seen by [email or web address]          -Update at : Pt requesting to leave AMA. Understands risks of leaving prior to completing treatement include, but are not limited to, worsening dyspnea, ACS, and possible death. Pt understands these risks and teach back was performed. The pt states that he has a FU appointment w/ Dr. Mansfield tomorrow, pt does not need refill on his medications. Return parameters discussed.

## 2023-05-01 NOTE — H&P ADULT - HISTORY OF PRESENT ILLNESS
67-year-old male w/ HFrEF 15-20% s/p ICD, decreased RV function, mild MR/TR, DM type I w/ neuropathy and hx hypoglycemia , DL, HTN, CAD, hx MI, s/p CABG, s/p stent (2018), hx in-stent thrombosis while on Plavix, PAD s/p 3 LLE stents (2 months prior to admission), TIAGO (needs CPAP auto-regulating pressure 10-16, patient doesn't use it due to claustrophobia), Psoriasis, R-AKA presenting to ED with worsening shortness of breath x4 days.  Endorsing orthopnea.  Unable to lie flat so came to ER.  Has been compliant with outpatient medications.  Patient takes 40 mg torsemide daily.  Cardiologist is Dr. Lopez. Denies fever, chills, N/V, cough, sputum, sick contacts, CP.     In ED patient hemodynamically stable, afebrile. Chest x-ray showing bilateral opacities.   EKG unchanged from previous Patient given torsemide, ED team spoke to his cardiologist Dr. Lopez, agrees with plan and admission for telemetry for acute on chronic heart failure exacerbation

## 2023-05-01 NOTE — ED PROVIDER NOTE - CLINICAL SUMMARY MEDICAL DECISION MAKING FREE TEXT BOX
This is a patient with history of heart failure, presenting with likely acute decompensated heart failure causing volume overload. The etiology of the decompensation is not certain. Alternative etiologies I considered include cardiac (ACS, valvular disease, arrhythmia, myocarditis/endocarditis, dissection) however given unremarkable ekg, cardiac exam have low suspicion. Also considered but low risk for respiratory cause (COPD, asthma, PE, or PNA), medication noncompliance or dietary indiscretion, alcohol or drug abuse, endocrine (thyrotoxicosis), and anemia. The patient was given lasix and admitted for acute management of ADHF.

## 2023-05-01 NOTE — DISCHARGE NOTE PROVIDER - NSDCCPCAREPLAN_GEN_ALL_CORE_FT
PRINCIPAL DISCHARGE DIAGNOSIS  Diagnosis: CHF, acute on chronic  Assessment and Plan of Treatment:       SECONDARY DISCHARGE DIAGNOSES  Diagnosis: SOB (shortness of breath)  Assessment and Plan of Treatment:

## 2023-05-01 NOTE — ED PROVIDER NOTE - PROGRESS NOTE DETAILS
AH - Labs reviewed, unremarkable.  Chest x-ray showing bilateral opacities.  Patient given torsemide, spoke to cardiology Dr. oLpez, agrees with plan and admission for telemetry.  Dr. Lopez okay for admission without BNP and troponin given low utility in the setting.  EKG unchanged from previous;  Signed out to TIFFANIE Mancera, Admitted for acute on chronic CHF exacerbation

## 2023-05-01 NOTE — ED ADULT NURSE REASSESSMENT NOTE - NS ED NURSE REASSESS COMMENT FT1
Received pt from previous RN, Pt AxOx3. Pt verbalized wanting to go home despite encouragement and education. CN Nini made aware. On call physician, MD Aaron made aware. Attending physician MD Mahajan spoke with patient about going AMA and explained risks, pt verbalized understanding. AMA form signed. Discharge instructions provided. Education provided to follow up with PCP, pt verbalized understanding. Pt left facility at 2245, Peripheral IV removed, dressing applied. Pt left unit in wheelchair accompanied by sister.

## 2023-05-01 NOTE — ED PROVIDER NOTE - OBJECTIVE STATEMENT
67-year-old male w/ HFrEF 15-20% s/p ICD, decreased RV function, mild MR/TR, DM type I, DL, HTN, CAD, hx MI, s/p CABG, s/p stent (2018), PAD s/p 3 LLE stents, TIAGO, Psorias Who presents with worsening shortness of breath x4 days.  Endorsing orthopnea.  Unable to lie flat so came to ER.  Has been compliant with outpatient medications.  Patient takes 40 mg torsemide daily.  Cardiologist is Dr. Lopez.  Patient denies fevers, chills, cough, congestion, abdominal pain, chest pain, nausea, vomiting, headache, focal weakness.

## 2023-05-01 NOTE — DISCHARGE NOTE PROVIDER - NSDCFUSCHEDAPPT_GEN_ALL_CORE_FT
Brent Allen  [facility]  [address]  Scheduled Appointment: 06/27/2023

## 2023-05-01 NOTE — H&P ADULT - ASSESSMENT
68 y/o man with PMH of HFrEF 15-20% s/p ICD, decreased RV function, mild MR/TR, DM type I w/ neuropathy with hx recurrent hypoglycemic episodes HLD, HTN, CAD s/p MI, s/p CABG, s/p stent (2018), hx in-stent thrombosis while on Plavix, PAD s/p 3 LLE stents, TIAGO (needs CPAP auto-regulating pressure 10-16, patient doesn't use it due to claustrophobia), Psoriasis,  right AKA, MRSA bacteremia, left ankle surgery with fixation hardware and hypothyroid, recurrent cellulitis, esophagitis, diverticulosis w/ chung diverticulitis PRESENTS WITH  worsening shortness of breath over the past 4 days and associated orthopnea admitted to telemetry with primary working diagnosis acute on chronic HFrEF exacerbation    #Acute on chronic HFrEF (severely reduced EF on JOHN PAUL in 2022), s/p AICD  EKG shows ventricular pacing, unchanged from prior  s/p torsemide 40mg oral x1 in ED (prior trials of  lasix 40mg IV q12hr unsuccessful)  c/w  torsemide 40mg bid  monitor daily weights in bed (d/t AKA)  I's and O's, fluid restriction, low sodium diet  continue metoprolol   allergy to ACE and Entresto  telemetry monitoring  consider EP for AICD interrogation  cardiology consult (o/p cardiologist: Dr Lopez)    # CAD s/p MI, PCI, CABG  on ASA/prasugrel, metoprolol    # DM type 1  episodes of hypoglycemia  will reinstate most recent inpatient insulin regimen   given the multiple dose adjustments during last admission to minimize hypoglycemic events  monitor FSBG per routine  adjust insulin as indicated      #PVD s/p intervention  left LE with chronic skin changes   wound care  arterial duplex of Left LE: normal flow in March 2023  consider repeat arterial duplex  consider podiatry consulty    #Microcytic anemia  - decreased Hemoglobin since 3/2023 12.9->9.9  - possibly dilutional, f/u repeat CBC in am prior to iron studies    #Hypothyroid on synthroid      # DVT prophylaxis - lovenox   #R-LE non-weight bearing-OT consulted to prevent functional decline  #carb-consistent/dash diet  #full code             Detail Level: Detailed

## 2023-05-01 NOTE — DISCHARGE NOTE PROVIDER - NSDCMRMEDTOKEN_GEN_ALL_CORE_FT
aspirin 81 mg oral delayed release tablet: 1 tab(s) orally once a day  diazePAM 2 mg oral tablet: 0.5 tab(s) orally once a day (at bedtime)  DULoxetine 30 mg oral delayed release capsule: 1 cap(s) orally 3 times a day  insulin glargine 100 units/mL subcutaneous solution: 25 unit(s) subcutaneous once a day (at bedtime)  insulin glargine 100 units/mL subcutaneous solution: 40 microcurie subcutaneous once a day  insulin lispro 100 units/mL injectable solution: if your finger stick is 150-199 please inject 2 units  if your finger stick is 200-250 please inject 4 units  if your finger stick is 251-300 please inject 6 units  if your finger stick is 301-350 please inject 8 units  if your finger stick is more than 350 please call your physician     levothyroxine 25 mcg (0.025 mg) oral tablet: 1 tab(s) orally 6 times a week  levothyroxine 50 mcg (0.05 mg) oral tablet: 1 tab(s) orally once a week  metoprolol succinate 25 mg oral tablet, extended release: 1 tab(s) orally once a day  Multiple Vitamins oral tablet: 1 tab(s) orally once a day  pantoprazole 40 mg oral delayed release tablet: 1 tab(s) orally once a day (before a meal)  prasugrel 10 mg oral tablet: 1 tab(s) orally once a day  rosuvastatin 40 mg oral tablet: 1 tab(s) orally once a day  Soaanz 20 mg oral tablet: Please take two 20 mg tablets once daily. (total of 40mg once daily).  spironolactone 25 mg oral tablet: 1 tab(s) orally once a day

## 2023-05-01 NOTE — DISCHARGE NOTE PROVIDER - CARE PROVIDER_API CALL
Mihir Lopez)  Cardiovascular Disease; Interventional Cardiology  [address] [place] [address]  Phone: [phone]  Fax: [phone]  Follow Up Time:

## 2023-05-01 NOTE — DISCHARGE NOTE NURSING/CASE MANAGEMENT/SOCIAL WORK - NSDCPEFALRISK_GEN_ALL_CORE
For information on Fall & Injury Prevention, visit: [email or web address] OR  [email or web address] OR  https://www.cdc.gov/steadi/patient.html

## 2023-05-01 NOTE — DISCHARGE NOTE PROVIDER - ATTENDING DISCHARGE PHYSICAL EXAMINATION:
Physical exam shows pt in NAD. VSS, afebrile, not hypoxic on RA. A&Ox3. Non-focal neuro exam. Muscle strength/sensation intact. Crackles noted in B/L lung fields. RRR, no M/G/R. ABD is soft and non-tender, normoactive BSs. No pitting edema noted on LLE; Right AKA present. B/L wrists w/ multiple skin ulcerations, granulation tissue present, ACE bandages in place.

## 2023-05-01 NOTE — H&P ADULT - NSHPLABSRESULTS_GEN_ALL_CORE
LABS:                        9.9    6.81  )-----------( 251      ( 01 May 2023 15:19 )             32.8     05-01    138  |  97<L>  |  43<H>  ----------------------------<  96  4.1   |  26  |  1.1    Ca    9.1      01 May 2023 15:19    TPro  7.3  /  Alb  3.8  /  TBili  0.8  /  DBili  x   /  AST  22  /  ALT  21  /  AlkPhos  123<H>  05-01        RADIOLOGY:    < from: Xray Chest 1 View- PORTABLE-Urgent (05.01.23 @ 15:27) >    Impression:    Cardiomegaly with bilateral opacities/effusions, unchanged.

## 2023-05-01 NOTE — ED PROVIDER NOTE - ATTENDING CONTRIBUTION TO CARE
67-year-old male h/o HTN, HLD, CAD s/p stents and CABG, CHF s/p ICD, DM, PVD, TIAGO, rt AKA, cholecystectomy, admitted 2/22-3/1 with CHF exacerbation now p/w SOB 67-year-old male h/o HTN, HLD, CAD s/p stents and CABG, CHF s/p ICD, DM, PVD, TIAGO, rt AKA, cholecystectomy, admitted 2/22-3/1 with CHF exacerbation now p/w SOB x several days, + orthopnea, states is compliant with home medications, denies fever, hemoptysis, PND, chest pain, LE pain or swelling, recent immobilization or travel or other associated complaints at present. Old chart reviewed. I have reviewed and agree with the initial nursing note, except as documented in my note.    VSS, awake, alert, non-toxic appearing, oropharynx clear, no skin rash or lesions, no tracheal deviation, non-labored breathing without accessory muscle use apparent or pursed lip breathing, no retractions, speaks full sentences, equal BS BL, bibasilar crackles, +S1/S2, RRR, no m/r/g, abdomen soft, NT, ND, +BS, AO x 3, clear speech.

## 2023-05-01 NOTE — DISCHARGE NOTE NURSING/CASE MANAGEMENT/SOCIAL WORK - PATIENT PORTAL LINK FT
You can access the FollowMyHealth Patient Portal offered by [facility] by registering at the following website: [email or web address]. By joining [facility]’s FollowMyHealth portal, you will also be able to view your health information using other applications (apps) compatible with our system.

## 2023-05-06 NOTE — PATIENT PROFILE ADULT - FUNCTIONAL ASSESSMENT - BASIC MOBILITY 6.
Patient reports increased pain to right lower leg where wound is present. States wound was created by hitting on  door 3 weeks ago. Is being seen by wound care on . Was seen twice this week. Was told yesterday by nurse that wound might possibly be infected due to increased pain and some redness present. Patient denies fever.    Review of patient's allergies indicates:   Allergen Reactions    Cephalexin     Codeine     Meperidine      Other reaction(s): delerium, hallucination  Delerium  Delerium  Delerium      Penicillins     Tazarotene      Other reaction(s): rash, Unknown        Patient has verified the spelling of their name and  on armband.   APPEARANCE: Patient is alert, calm, oriented x 4, and does not appear distressed.  SKIN: Skin is normal for race, warm, and dry. Normal skin turgor and mucous membranes moist. +circular wound located to right anterior lower leg, no drainage present, skin around would slightly red  CARDIAC: Normal rate and rhythm, no murmur heard.   RESPIRATORY:Normal rate and effort. Breath sounds clear bilaterally throughout chest. Respirations are equal and unlabored.    GASTRO: Bowel sounds normal, abdomen is soft, no tenderness, and no abdominal distention.  MUSCLE: Full ROM. No bony tenderness or soft tissue tenderness. No obvious deformity. +patient ambulates with roller walker +pain to right lower anterior leg      
3 = A little assistance

## 2023-05-18 NOTE — H&P ADULT - ATTENDING COMMENTS
HPI:  67-year-old male w/ HFrEF 15-20% s/p ICD, decreased RV function, mild MR/TR, DM type I w/ neuropathy and hx hypoglycemia , DL, HTN, CAD, hx MI, s/p CABG, s/p stent (2018), hx in-stent thrombosis while on Plavix, PAD s/p 3 LLE stents, TIAGO (needs CPAP auto-regulating pressure 10-16, patient doesn't use it due to claustrophobia), Psoriasis, R-AKA presenting to ED s/p fall. He states he was lying in bed at home when he fell asleep and fell off the bed, and complains of generalized headache neck pain, non-radiating, no alleviating or aggravating factors, associated with right anterior chest wall pain. Denies LOC,  fevers, chills, nausea, vomiting, dizziness, abdominal pain, shortness of breath. States he had TDAP recently. Pt was not down for long as wife was upstairs when he fell off the bed and called EMS right away.     ED  Vital Signs  T(F):Max: 97.9  HR: 76 - 110 BP: 100/61 - 102/62  RR: 18   SpO2: 94% - 96% on room air     Labs significant for cr 1.8 (baseline ~ 1.2)  trop 0.10     (18 May 2023 14:16)    REVIEW OF SYSTEMS: see cc/HPI   CONSTITUTIONAL: No weakness, fevers or chills  EYES/ENT: No visual changes;  No vertigo or throat pain   NECK: No pain or stiffness  RESPIRATORY: No cough, wheezing, hemoptysis; No shortness of breath  CARDIOVASCULAR: No chest pain or palpitations, see cc/HPI   GASTROINTESTINAL: No abdominal or epigastric pain. No nausea, vomiting, or hematemesis; No diarrhea or constipation. No melena or hematochezia.  GENITOURINARY: No dysuria, frequency or hematuria  NEUROLOGICAL: No numbness or weakness  SKIN: No itching, rashes    Physical Exam:  General: WN/WD NAD  Neurology: A&Ox3, nonfocal, follows commands  Eyes: PERRLA/ EOMI  ENT/Neck: Neck supple, trachea midline, No JVD  Respiratory: CTA B/L, No wheezing, rales, rhonchi  CV: Normal rate regular rhythm, S1S2, no murmurs, rubs or gallops  Abdominal: Soft, NT, ND +BS,   Extremities: No edema, + peripheral pulses  Skin: No Rashes, Hematoma, Ecchymosis  Incisions:   Tubes:    A/p  Fall / diff ambulating     Elevated Trop  Cardiomyopathy / HFrEF s/p AICD     MOISES      DM type I   Hypothyroidism HPI:  67-year-old male w/ HFrEF 15-20% s/p ICD, decreased RV function, mild MR/TR, DM type I w/ neuropathy and hx hypoglycemia , DL, HTN, CAD, hx MI, s/p CABG, s/p stent (2018), hx in-stent thrombosis while on Plavix, PAD s/p 3 LLE stents, TIAGO (needs CPAP auto-regulating pressure 10-16, patient doesn't use it due to claustrophobia), Psoriasis, R-AKA presenting to ED s/p fall. He states he was lying in bed at home when he fell asleep and fell off the bed, and complains of generalized headache neck pain, non-radiating, no alleviating or aggravating factors, associated with right anterior chest wall pain. Denies LOC,  fevers, chills, nausea, vomiting, dizziness, abdominal pain, shortness of breath. States he had TDAP recently. Pt was not down for long as wife was upstairs when he fell off the bed and called EMS right away.     ED  Vital Signs  T(F):Max: 97.9  HR: 76 - 110 BP: 100/61 - 102/62  RR: 18   SpO2: 94% - 96% on room air     Labs significant for cr 1.8 (baseline ~ 1.2)  trop 0.10     (18 May 2023 14:16)    REVIEW OF SYSTEMS: see cc/HPI   CONSTITUTIONAL: No weakness, fevers or chills  EYES/ENT: No visual changes;  No vertigo or throat pain   NECK: No pain or stiffness  RESPIRATORY: No cough, wheezing, hemoptysis; No shortness of breath  CARDIOVASCULAR: No chest pain or palpitations, see cc/HPI   GASTROINTESTINAL: No abdominal or epigastric pain. No nausea, vomiting, or hematemesis; No diarrhea or constipation. No melena or hematochezia.  GENITOURINARY: No dysuria, frequency or hematuria  NEUROLOGICAL: No numbness or weakness  SKIN: No itching, rashes    Physical Exam:  General: WN/WD NAD  Neurology: A&Ox3, nonfocal, follows commands  Eyes: PERRLA/ EOMI  ENT/Neck: Neck supple, trachea midline, No JVD  Respiratory: CTA B/L, No wheezing, rales, rhonchi  CV: Normal rate regular rhythm, S1S2, no murmurs, rubs or gallops  Abdominal: Soft, NT, ND +BS,   Extremities: No edema, + peripheral pulses  Skin: No Rashes, Hematoma, Ecchymosis  Incisions:   Tubes:    A/p  Fall / diff ambulating 2/2 R AKA r/o arrhythmia r/o orthostatic BP  -fall precautions   -PT/rehab  eval   -EP to eval AICD     Elevated Trop  Cardiomyopathy / HFrEF s/p AICD   -c/w OP Rx     MOISES  - agree w/ trending Scr and Trop    DM type I   Hypothyroidism  c/w OP rx and monitoring     DVT prophylaxis    PATIENT SEEN by ATTENDING 5/18 ( note revised 5/19).

## 2023-05-18 NOTE — ED PROVIDER NOTE - ATTENDING CONTRIBUTION TO CARE
67-year-old male past medical history history of heart failure decreased RV PAD on antiplatelets.  With a possible syncopal fall?  pt woke up on the floor.  Elderly NAD, non toxic. NCAT PERRLA EOMI neck supple non tender normal wob cta bl rrr abdomen s nt nd no rebound no guarding WWPx4 neuro non focal right BKA left knee abrasion bilateral nonhealing ulcers with transfer for CAT scan trauma eval

## 2023-05-18 NOTE — ED PROVIDER NOTE - CARE PLAN
1 Principal Discharge DX:	Fall  Secondary Diagnosis:	Closed head injury   Principal Discharge DX:	Fall  Secondary Diagnosis:	Closed head injury  Secondary Diagnosis:	MOISES (acute kidney injury)  Secondary Diagnosis:	Elevated troponin level

## 2023-05-18 NOTE — H&P ADULT - ASSESSMENT
#HFrEF  s/p AICD  #CAD s/p MI, PCI, CABG  -c/w  torsemide 40mg bid  -c/w metoprolol   -c/w asa/pradugrel  -has allergy to ACE and Entresto  -telemetry monitoring  -o/p cardiologist: Dr Foote      # DM type 1  episodes of hypoglycemia  will reinstate most recent inpatient insulin regimen   given the multiple dose adjustments during last admission to minimize hypoglycemic events  monitor FSBG per routine  adjust insulin as indicated      #Hypothyroid   -c/w  synthroid      # DVT prophylaxis - lovenox   #R-LE non-weight bearing-OT consulted to prevent functional decline  #carb-consistent/dash diet  #full code     67-year-old male w/ HFrEF 15-20% s/p ICD, decreased RV function, mild MR/TR, DM type I w/ neuropathy and hx hypoglycemia , DL, HTN, CAD, hx MI, s/p CABG, s/p stent (2018), hx in-stent thrombosis while on Plavix, PAD s/p 3 LLE stents, TIAGO (needs CPAP auto-regulating pressure 10-16, patient doesn't use it due to claustrophobia), Psoriasis, R-AKA presenting to ED s/p fall. He states he was lying in bed at home when he fell asleep and fell off the bed. Found to have moises and mild elevation of trop     Labs significant for cr 1.8 (baseline ~ 1.2)  trop 0.10    #Mechanical Fall   -No CT evidence for acute traumatic injury within the chest, abdomen or pelvis  -No evidence of acute intracranial pathology. Stable exam since 1/13/2023.  -No evidence of acute cervical spine fracture or subluxation.  -Vitals wnl   -PT eval     #MOISES   #Troponinemia   - elevated trop can be in the setting of MOISES   -pt denies chest pain or increased dyspnea from baseline   -EKG noted: ventricular paced rhythm   -s/p 250 cc bolus in the ED  -trend cr   -f/u repeat trop , bnp           #HFrEF  s/p AICD  #CAD s/p MI, PCI, CABG  -CT chest notable for Moderate to large bilateral pleural effusions, right greater than left. Small to moderate volume abdominopelvic ascites.  -c/w  torsemide 40mg bid  -c/w metoprolol   -c/w asa/prasugrel  -has allergy to ACE and Entresto  -telemetry monitoring  -o/p cardiologist: Dr Foote  -cardio eval pending       # DM type 1  episodes of hypoglycemia at home   will reinstate most recent inpatient insulin regimen   given the multiple dose adjustments during last admission to minimize hypoglycemic events  monitor FSBG per routine  adjust insulin as indicated      #Hypothyroid   -c/w  synthroid      # DVT prophylaxis - lovenox   #R-LE non-weight bearing  #carb-consistent/dash diet  #full code

## 2023-05-18 NOTE — H&P ADULT - NSHPPHYSICALEXAM_GEN_ALL_CORE
LOS:     VITALS:   T(C): 36.3 (05-18-23 @ 09:03), Max: 36.6 (05-18-23 @ 06:52)  HR: 76 (05-18-23 @ 09:03) (76 - 110)  BP: 100/61 (05-18-23 @ 09:03) (100/61 - 102/62)  RR: 18 (05-18-23 @ 09:03) (18 - 18)  SpO2: 96% (05-18-23 @ 09:03) (94% - 96%)    GENERAL: NAD, lying in bed comfortably  HEAD:  Atraumatic, Normocephalic  EYES: EOMI, PERRLA, conjunctiva and sclera clear  ENT: Moist mucous membranes  NECK: Supple, No JVD  CHEST/LUNG: Clear to auscultation bilaterally; No rales, rhonchi, wheezing, or rubs. Unlabored respirations  HEART: Regular rate and rhythm; No murmurs, rubs, or gallops  ABDOMEN: BSx4; Soft, nontender, nondistended  EXTREMITIES:  2+ Peripheral Pulses, brisk capillary refill. No clubbing, cyanosis, or edema  NERVOUS SYSTEM:  A&Ox3, no focal deficits   SKIN: No rashes or lesions LOS:     VITALS:   T(C): 36.3 (05-18-23 @ 09:03), Max: 36.6 (05-18-23 @ 06:52)  HR: 76 (05-18-23 @ 09:03) (76 - 110)  BP: 100/61 (05-18-23 @ 09:03) (100/61 - 102/62)  RR: 18 (05-18-23 @ 09:03) (18 - 18)  SpO2: 96% (05-18-23 @ 09:03) (94% - 96%)    CONSTITUTIONAL: in no acute distress, afebrile  SKIN: Warm, dry; multiple psoriasis lesions, healing wounds to b/l hands and LLE  EYES: No conjunctival injection. EOMI  ENT: No nasal discharge; oropharynx nonerythematous; airway clear  NECK: Supple; non tender, No JVD  CARD:  Regular rate and rhythm; S1, S2 normal; no murmurs, gallops, or rubs  RESP: mild b/l basilar crackles; not tachypneic, no increased WOB  ABD: Soft NTND; No guarding or rebound tenderness  EXT: No clubbing or cyanosis. no pitting edema;  RLE amputation;  r knee lesion from fall wrapped   NEURO: A&O x3, grossly unremarkable, no focal deficits

## 2023-05-18 NOTE — ED PROVIDER NOTE - OBJECTIVE STATEMENT
68 yo male, PMHx of HFrEF 15-20% s/p ICD, decreased RV function, mild MR/TR, DM type I, DL, HTN, CAD, hx MI, s/p CABG, s/p stent (2018), PAD s/p 3 LLE stents, TIAGO, Psorias, presents s/p fall. He states he was lying in bed at home when he fell asleep and fell off the bed, and complains of generalized headache neck pain, non-radiating, no alleviating or aggravating factors, associated with right anterior chest wall pain. Denies fevers, chills, nausea, vomiting, dizziness, abdominal pain, shortness of breath. States he had TDAP recently.

## 2023-05-18 NOTE — ED ADULT NURSE REASSESSMENT NOTE - NS ED NURSE REASSESS COMMENT FT1
pt transferred from [facility] due to ct [place]. pt a&ox4. sleepy but arousable. pt states he fell out of his bed which is 2 ft of height. no ss of acute distress. safety maintained. stretcher alarm in place.

## 2023-05-18 NOTE — ED PROVIDER NOTE - PROGRESS NOTE DETAILS
AH - CTs reviewed, no acute traumatic injuries.  Labs reviewed, patient with MOISES.  Given small volume fluids given low EF.  Troponin elevated from baseline, potentially due to MOISES.  EKG reviewed by me, no STEMI.  Patient has no chest pain.  Will admit for r/o acs and fall.  Routine cardiology consult placed.  Signed out to TIFFANIE aragon.  VSS.

## 2023-05-18 NOTE — H&P ADULT - NSHPLABSRESULTS_GEN_ALL_CORE
.  LABS:                         9.4    8.43  )-----------( 404      ( 18 May 2023 09:30 )             32.8         136  |  96<L>  |  54<H>  ----------------------------<  183<H>  4.8   |  28  |  1.8<H>    Ca    8.5      18 May 2023 09:30    TPro  6.4  /  Alb  3.4<L>  /  TBili  1.0  /  DBili  x   /  AST  32  /  ALT  24  /  AlkPhos  119<H>      PT/INR - ( 18 May 2023 09:30 )   PT: 22.70 sec;   INR: 1.95 ratio         PTT - ( 18 May 2023 09:30 )  PTT:26.5 sec  Urinalysis Basic - ( 18 May 2023 12:20 )    Color: Yellow / Appearance: Clear / S.031 / pH: x  Gluc: x / Ketone: Negative  / Bili: Negative / Urobili: <2 mg/dL   Blood: x / Protein: 30 mg/dL / Nitrite: Negative   Leuk Esterase: Large / RBC: 2 /HPF / WBC 36 /HPF   Sq Epi: x / Non Sq Epi: x / Bacteria: Negative        Lactate, Blood: 1.7 mmol/L ( @ 09:30)      RADIOLOGY, EKG & ADDITIONAL TESTS: Reviewed.

## 2023-05-18 NOTE — ED PROVIDER NOTE - CLINICAL SUMMARY MEDICAL DECISION MAKING FREE TEXT BOX
Pt signed out to me from Dr. Lutz: 66 yo M presented s/p syncope vs mechanical fall out of bed today. No signs of acute traumatic pathology. Labs and EKG were ordered and reviewed.  Imaging was ordered and reviewed by me.  Appropriate medications for patient's presenting complaints were ordered and effects were reassessed.  Patient's records (prior hospital, ED visit, and/or nursing home notes if available) were reviewed.  Additional history was obtained from EMS, family, and/or PCP (where available).  Escalation to admission/observation was considered. Patient requires inpatient hospitalization - monitored setting on telemetry due to elevation in troponin.

## 2023-05-18 NOTE — H&P ADULT - HISTORY OF PRESENT ILLNESS
67-year-old male w/ HFrEF 15-20% s/p ICD, decreased RV function, mild MR/TR, DM type I w/ neuropathy and hx hypoglycemia , DL, HTN, CAD, hx MI, s/p CABG, s/p stent (2018), hx in-stent thrombosis while on Plavix, PAD s/p 3 LLE stents (2 months prior to admission), TIAGO (needs CPAP auto-regulating pressure 10-16, patient doesn't use it due to claustrophobia), Psoriasis, R-AKA presenting to ED s/p fall. He states he was lying in bed at home when he fell asleep and fell off the bed, and complains of generalized headache neck pain, non-radiating, no alleviating or aggravating factors, associated with right anterior chest wall pain. Denies fevers, chills, nausea, vomiting, dizziness, abdominal pain, shortness of breath. States he had TDAP recently.    ED  Vital Signs  T(C): 36.3 (18 May 2023 09:03), Max: 36.6 (18 May 2023 06:52)  T(F): 97.3 (18 May 2023 09:03), Max: 97.9 (18 May 2023 06:52)  HR: 76 (18 May 2023 09:03) (76 - 110)  BP: 100/61 (18 May 2023 09:03) (100/61 - 102/62)  BP(mean): --  ABP: --  ABP(mean): --  RR: 18 (18 May 2023 09:03) (18 - 18)  SpO2: 96% (18 May 2023 09:03) (94% - 96%)    O2 Parameters below as of 18 May 2023 09:03  Patient On (Oxygen Delivery Method): room air         67-year-old male w/ HFrEF 15-20% s/p ICD, decreased RV function, mild MR/TR, DM type I w/ neuropathy and hx hypoglycemia , DL, HTN, CAD, hx MI, s/p CABG, s/p stent (2018), hx in-stent thrombosis while on Plavix, PAD s/p 3 LLE stents (2 months prior to admission), TIAGO (needs CPAP auto-regulating pressure 10-16, patient doesn't use it due to claustrophobia), Psoriasis, R-AKA presenting to ED s/p fall. He states he was lying in bed at home when he fell asleep and fell off the bed, and complains of generalized headache neck pain, non-radiating, no alleviating or aggravating factors, associated with right anterior chest wall pain. Denies fevers, chills, nausea, vomiting, dizziness, abdominal pain, shortness of breath. States he had TDAP recently.    ED  Vital Signs  T(F):Max: 97.9   HR: 76 - 110  BP: 100/61 - 102/62  RR: 18 (  SpO2: 94% - 96% on room air              67-year-old male w/ HFrEF 15-20% s/p ICD, decreased RV function, mild MR/TR, DM type I w/ neuropathy and hx hypoglycemia , DL, HTN, CAD, hx MI, s/p CABG, s/p stent (2018), hx in-stent thrombosis while on Plavix, PAD s/p 3 LLE stents, TIAGO (needs CPAP auto-regulating pressure 10-16, patient doesn't use it due to claustrophobia), Psoriasis, R-AKA presenting to ED s/p fall. He states he was lying in bed at home when he fell asleep and fell off the bed, and complains of generalized headache neck pain, non-radiating, no alleviating or aggravating factors, associated with right anterior chest wall pain. Denies LOC,  fevers, chills, nausea, vomiting, dizziness, abdominal pain, shortness of breath. States he had TDAP recently. Pt was not down for long as wife was upstairs when he fell off the bed and called EMS right away.     ED  Vital Signs  T(F):Max: 97.9   HR: 76 - 110  BP: 100/61 - 102/62  RR: 18   SpO2: 94% - 96% on room air     Labs significant for cr 1.8 (baseline ~ 1.2)  trop 0.10

## 2023-05-18 NOTE — ED ADULT NURSE REASSESSMENT NOTE - NS ED NURSE REASSESS COMMENT FT1
Patient being transferred to [facility] for Cat Scan. Report Called to Charge NICANOR Hernandez, awaiting EMS at this time.

## 2023-05-18 NOTE — ED PROVIDER NOTE - PHYSICAL EXAMINATION
CONSTITUTIONAL: Well-developed; well-nourished; in no acute distress.   SKIN: warm, dry, +abrasions on left lower extremity. + abrasion left knee.  HEAD: Normocephalic;   EYES: PERRL, EOMI, no conjunctival erythema  ENT: No nasal discharge; airway clear.  NECK: Supple; non tender. no midline or paraspinal tenderness  CARD: S1, S2 normal; Regular rate and rhythm. +left chest AICD  RESP: No wheezes, rales or rhonchi. decreased breath sounds bibasilar  ABD: soft ntnd  EXT: +rt BKA. full ROM of b/l UE and LLE.  NEURO: Alert, oriented, grossly unremarkable  PSYCH: Cooperative, appropriate.

## 2023-05-18 NOTE — ED ADULT NURSE NOTE - NSFALLHARMRISKINTERV_ED_ALL_ED

## 2023-05-19 NOTE — ED ADULT NURSE REASSESSMENT NOTE - NS ED NURSE REASSESS COMMENT FT1
patient noted to be more confused, v/s checked, noted to have FS:51. d50,25g IV, given.   MD Cristopher Aparicio, made aware. patient admitted to [facility].

## 2023-05-19 NOTE — PROGRESS NOTE ADULT - SUBJECTIVE AND OBJECTIVE BOX
FLOWER MARISCAL  67y  [facility] ED Hold 063 A      Patient is a 67y old  Male who presents with a chief complaint of Mechanical fall (18 May 2023 14:16)      INTERVAL HPI/OVERNIGHT EVENTS:        REVIEW OF SYSTEMS:        FAMILY HISTORY:  Family history of heart disease (Mother)    Family history of allergies  daughter      T(C): 36.4 (05-18-23 @ 16:56), Max: 36.4 (05-18-23 @ 16:56)  HR: 80 (05-19-23 @ 07:35) (76 - 82)  BP: 103/68 (05-19-23 @ 07:35) (96/60 - 103/68)  RR: 17 (05-19-23 @ 06:20) (16 - 18)  SpO2: 96% (05-19-23 @ 07:35) (95% - 96%)  Wt(kg): --Vital Signs Last 24 Hrs  T(C): 36.4 (18 May 2023 16:56), Max: 36.4 (18 May 2023 16:56)  T(F): 97.5 (18 May 2023 16:56), Max: 97.5 (18 May 2023 16:56)  HR: 80 (19 May 2023 07:35) (76 - 82)  BP: 103/68 (19 May 2023 07:35) (96/60 - 103/68)  BP(mean): 75 (19 May 2023 06:20) (69 - 75)  RR: 17 (19 May 2023 06:20) (16 - 18)  SpO2: 96% (19 May 2023 07:35) (95% - 96%)    Parameters below as of 19 May 2023 07:35  Patient On (Oxygen Delivery Method): room air        PHYSICAL EXAM:  GENERAL: NAD, well-groomed, well-developed  HEAD:  Atraumatic, Normocephalic  EYES: EOMI, PERRLA, conjunctiva and sclera clear  ENMT: No tonsillar erythema, exudates, or enlargement; Moist mucous membranes, Good dentition, No lesions  NECK: Supple, No JVD, Normal thyroid  NERVOUS SYSTEM:  Alert & Oriented X3, Good concentration; Motor Strength 5/5 B/L upper and lower extremities; DTRs 2+ intact and symmetric  PULM: Clear to auscultation bilaterally  CARDIAC: Regular rate and rhythm; No murmurs, rubs, or gallops  GI: Soft, Nontender, Nondistended; Bowel sounds present  EXTREMITIES:  2+ Peripheral Pulses, No clubbing, cyanosis, or edema  LYMPH: No lymphadenopathy noted  SKIN: No rashes or lesions    Consultant(s) Notes Reviewed:  [x ] YES  [ ] NO  Care Discussed with Consultants/Other Providers [ x] YES  [ ] NO    LABS:                            9.8    7.14  )-----------( 380      ( 19 May 2023 06:23 )             33.4   05-19    135  |  96<L>  |  59<H>  ----------------------------<  104<H>  5.7<H>   |  23  |  1.9<H>    Ca    8.7      19 May 2023 06:23  Mg     2.0     05-19    TPro  6.4  /  Alb  3.4<L>  /  TBili  1.0  /  DBili  x   /  AST  32  /  ALT  24  /  AlkPhos  119<H>  05-18            aspirin enteric coated 81 milliGRAM(s) Oral daily  dextrose 5%. 1000 milliLiter(s) IV Continuous <Continuous>  dextrose 5%. 1000 milliLiter(s) IV Continuous <Continuous>  dextrose 50% Injectable 25 Gram(s) IV Push once  dextrose 50% Injectable 12.5 Gram(s) IV Push once  dextrose 50% Injectable 25 Gram(s) IV Push once  dextrose Oral Gel 15 Gram(s) Oral once PRN  diazepam    Tablet 1 milliGRAM(s) Oral at bedtime  DULoxetine 30 milliGRAM(s) Oral <User Schedule>  glucagon  Injectable 1 milliGRAM(s) IntraMuscular once  heparin   Injectable 5000 Unit(s) SubCutaneous every 12 hours  hydrocortisone 2.5% Cream 1 Application(s) Topical two times a day  insulin glargine Injectable (LANTUS) 18 Unit(s) SubCutaneous at bedtime  insulin lispro (ADMELOG) corrective regimen sliding scale   SubCutaneous three times a day before meals  insulin lispro Injectable (ADMELOG) 8 Unit(s) SubCutaneous three times a day before meals  levothyroxine 50 MICROGram(s) Oral <User Schedule>  levothyroxine 25 MICROGram(s) Oral <User Schedule>  metoprolol succinate ER 25 milliGRAM(s) Oral daily  pantoprazole    Tablet 40 milliGRAM(s) Oral before breakfast  prasugrel 10 milliGRAM(s) Oral daily    67-year-old male w/ HFrEF 15-20% s/p ICD, decreased RV function, mild MR/TR, DM type I w/ neuropathy and hx hypoglycemia , DL, HTN, CAD, hx MI, s/p CABG, s/p stent (2018), hx in-stent thrombosis while on Plavix, PAD s/p 3 LLE stents, TIAGO (needs CPAP auto-regulating pressure 10-16, patient doesn't use it due to claustrophobia), Psoriasis, R-AKA presenting to ED s/p fall. He states he was lying in bed at home when he fell asleep and fell off the bed. Found to have moises and mild elevation of trop     Labs significant for cr 1.8 (baseline ~ 1.2)  trop 0.10    1. Mechanical Fall   -No CT evidence for acute traumatic injury within the chest, abdomen or pelvis  -No evidence of acute intracranial pathology. Stable exam since 1/13/2023.  -No evidence of acute cervical spine fracture or subluxation.  -Vitals wnl   -PT eval:pending      2. MOISES  associated with Troponinemia seems cardiorenal   * pt denies chest pain or increased dyspnea from baseline   -EKG noted: ventricular paced rhythm   - lasix was on hold   - Renal/bladder US:pending   - Urine urea/Urine cr:pending   - Received IVF.   - Trend trop     3. HFrEF  s/p AICD. hx CAD s/p MI, PCI, CABG  - Telemetry       -BNP:6000 (baseline is 9000)   -CT chest notable for Moderate to large bilateral pleural effusions, right greater than left. Small to moderate volume abdominopelvic ascites.  -c/w metoprolol   -c/w asa/prasugrel  * pt takes lasix 40mg po bid at home with spironolactone   -has allergy to ACE and Entresto  - Cardio consult:pending     4.  DM type 1  episodes of hypoglycemia at home  - Insulin protocol but watch for hypoglycemia     5.Hypothyroid   -c/w  synthroid      # DVT prophylaxis - lovenox   #R-LE non-weight bearing  #carb-consistent/dash diet  #full code       LOIDA, JOHN  67y  [facility] ED Hold 063 A      Patient is a 67y old  Male who presents with a chief complaint of Mechanical fall (18 May 2023 14:16)      INTERVAL HPI/OVERNIGHT EVENTS:    Patient still not feeling well. reporting sob when laying down that improve with sitting down   denies any other complains for now         FAMILY HISTORY:  Family history of heart disease (Mother)    Family history of allergies  daughter      T(C): 36.4 (05-18-23 @ 16:56), Max: 36.4 (05-18-23 @ 16:56)  HR: 80 (05-19-23 @ 07:35) (76 - 82)  BP: 103/68 (05-19-23 @ 07:35) (96/60 - 103/68)  RR: 17 (05-19-23 @ 06:20) (16 - 18)  SpO2: 96% (05-19-23 @ 07:35) (95% - 96%)  Wt(kg): --Vital Signs Last 24 Hrs  T(C): 36.4 (18 May 2023 16:56), Max: 36.4 (18 May 2023 16:56)  T(F): 97.5 (18 May 2023 16:56), Max: 97.5 (18 May 2023 16:56)  HR: 80 (19 May 2023 07:35) (76 - 82)  BP: 103/68 (19 May 2023 07:35) (96/60 - 103/68)  BP(mean): 75 (19 May 2023 06:20) (69 - 75)  RR: 17 (19 May 2023 06:20) (16 - 18)  SpO2: 96% (19 May 2023 07:35) (95% - 96%)    Parameters below as of 19 May 2023 07:35  Patient On (Oxygen Delivery Method): room air        PHYSICAL EXAM:  GENERAL: NAD, well-groomed, well-developed  NERVOUS SYSTEM:  Alert & Oriented X3,  PULM: Diffuse crackles   CARDIAC: Regular rate and rhythm; No murmurs, rubs, or gallops  GI: Soft, Nontender, distended; Bowel sounds present  Lower ext: chronic changes , no pitting edema     Consultant(s) Notes Reviewed:  [x ] YES  [ ] NO  Care Discussed with Consultants/Other Providers [ x] YES  [ ] NO    LABS:                            9.8    7.14  )-----------( 380      ( 19 May 2023 06:23 )             33.4   05-19    135  |  96<L>  |  59<H>  ----------------------------<  104<H>  5.7<H>   |  23  |  1.9<H>    Ca    8.7      19 May 2023 06:23  Mg     2.0     05-19    TPro  6.4  /  Alb  3.4<L>  /  TBili  1.0  /  DBili  x   /  AST  32  /  ALT  24  /  AlkPhos  119<H>  05-18            aspirin enteric coated 81 milliGRAM(s) Oral daily  dextrose 5%. 1000 milliLiter(s) IV Continuous <Continuous>  dextrose 5%. 1000 milliLiter(s) IV Continuous <Continuous>  dextrose 50% Injectable 25 Gram(s) IV Push once  dextrose 50% Injectable 12.5 Gram(s) IV Push once  dextrose 50% Injectable 25 Gram(s) IV Push once  dextrose Oral Gel 15 Gram(s) Oral once PRN  diazepam    Tablet 1 milliGRAM(s) Oral at bedtime  DULoxetine 30 milliGRAM(s) Oral <User Schedule>  glucagon  Injectable 1 milliGRAM(s) IntraMuscular once  heparin   Injectable 5000 Unit(s) SubCutaneous every 12 hours  hydrocortisone 2.5% Cream 1 Application(s) Topical two times a day  insulin glargine Injectable (LANTUS) 18 Unit(s) SubCutaneous at bedtime  insulin lispro (ADMELOG) corrective regimen sliding scale   SubCutaneous three times a day before meals  insulin lispro Injectable (ADMELOG) 8 Unit(s) SubCutaneous three times a day before meals  levothyroxine 50 MICROGram(s) Oral <User Schedule>  levothyroxine 25 MICROGram(s) Oral <User Schedule>  metoprolol succinate ER 25 milliGRAM(s) Oral daily  pantoprazole    Tablet 40 milliGRAM(s) Oral before breakfast  prasugrel 10 milliGRAM(s) Oral daily    67-year-old male w/ HFrEF 15-20% s/p ICD, decreased RV function, mild MR/TR, DM type I w/ neuropathy and hx hypoglycemia , DL, HTN, CAD, hx MI, s/p CABG, s/p stent (2018), hx in-stent thrombosis while on Plavix, PAD s/p 3 LLE stents, TIAGO (needs CPAP auto-regulating pressure 10-16, patient doesn't use it due to claustrophobia), Psoriasis, R-AKA presenting to ED s/p fall. He states he was lying in bed at home when he fell asleep and fell off the bed. Found to have moises and mild elevation of trop     1. Mechanical Fall   -No CT evidence for acute traumatic injury within the chest, abdomen or pelvis  -No evidence of acute intracranial pathology. Stable exam since 1/13/2023.  -No evidence of acute cervical spine fracture or subluxation.  -Vitals wnl   -PT eval:pending      2.Likely Acute systolic CHF complicated by MOISES (cardiorenal) and Troponinemia  * pt denies chest pain but report orthopnea  - Telemetry            - ECG :paced rythm     -CXR: congestion with BNP 6000   - Cont asa/prasugrel   - Cont metoprolol  - has allergy to ACE and Entresto   - Lasix 60mg IV *1 (re-start lasix awaiting kidney function)   - Renal/bladder US:pending   - Trend trop   (detectable likely demand ischemia in the setting of chf)   - CT chest notable for Moderate to large bilateral pleural effusions, right greater than left. Small to moderate volume abdominopelvic ascites.  - Cardio consult:pending    3.  DM type 1  episodes of hypoglycemia at home  - Insulin protocol but watch for hypoglycemia     4.Hypothyroid   -c/w  synthroid      # DVT prophylaxis - lovenox   #R-LE non-weight bearing  #carb-consistent/dash diet  #full code       LOIDA, JOHN  67y  [facility] ED Hold 063 A      Patient is a 67y old  Male who presents with a chief complaint of Mechanical fall (18 May 2023 14:16)      INTERVAL HPI/OVERNIGHT EVENTS:    Patient still not feeling well. reporting sob when laying down that improve with sitting down   denies any other complains for now         FAMILY HISTORY:  Family history of heart disease (Mother)    Family history of allergies  daughter      T(C): 36.4 (05-18-23 @ 16:56), Max: 36.4 (05-18-23 @ 16:56)  HR: 80 (05-19-23 @ 07:35) (76 - 82)  BP: 103/68 (05-19-23 @ 07:35) (96/60 - 103/68)  RR: 17 (05-19-23 @ 06:20) (16 - 18)  SpO2: 96% (05-19-23 @ 07:35) (95% - 96%)  Wt(kg): --Vital Signs Last 24 Hrs  T(C): 36.4 (18 May 2023 16:56), Max: 36.4 (18 May 2023 16:56)  T(F): 97.5 (18 May 2023 16:56), Max: 97.5 (18 May 2023 16:56)  HR: 80 (19 May 2023 07:35) (76 - 82)  BP: 103/68 (19 May 2023 07:35) (96/60 - 103/68)  BP(mean): 75 (19 May 2023 06:20) (69 - 75)  RR: 17 (19 May 2023 06:20) (16 - 18)  SpO2: 96% (19 May 2023 07:35) (95% - 96%)    Parameters below as of 19 May 2023 07:35  Patient On (Oxygen Delivery Method): room air        PHYSICAL EXAM:  GENERAL: NAD, well-groomed, well-developed  NERVOUS SYSTEM:  Alert & Oriented X3,  PULM: Diffuse crackles   CARDIAC: Regular rate and rhythm; No murmurs, rubs, or gallops  GI: Soft, Nontender, distended; Bowel sounds present  Lower ext: chronic changes , no pitting edema     Consultant(s) Notes Reviewed:  [x ] YES  [ ] NO  Care Discussed with Consultants/Other Providers [ x] YES  [ ] NO    LABS:                            9.8    7.14  )-----------( 380      ( 19 May 2023 06:23 )             33.4   05-19    135  |  96<L>  |  59<H>  ----------------------------<  104<H>  5.7<H>   |  23  |  1.9<H>    Ca    8.7      19 May 2023 06:23  Mg     2.0     05-19    TPro  6.4  /  Alb  3.4<L>  /  TBili  1.0  /  DBili  x   /  AST  32  /  ALT  24  /  AlkPhos  119<H>  05-18            aspirin enteric coated 81 milliGRAM(s) Oral daily  dextrose 5%. 1000 milliLiter(s) IV Continuous <Continuous>  dextrose 5%. 1000 milliLiter(s) IV Continuous <Continuous>  dextrose 50% Injectable 25 Gram(s) IV Push once  dextrose 50% Injectable 12.5 Gram(s) IV Push once  dextrose 50% Injectable 25 Gram(s) IV Push once  dextrose Oral Gel 15 Gram(s) Oral once PRN  diazepam    Tablet 1 milliGRAM(s) Oral at bedtime  DULoxetine 30 milliGRAM(s) Oral <User Schedule>  glucagon  Injectable 1 milliGRAM(s) IntraMuscular once  heparin   Injectable 5000 Unit(s) SubCutaneous every 12 hours  hydrocortisone 2.5% Cream 1 Application(s) Topical two times a day  insulin glargine Injectable (LANTUS) 18 Unit(s) SubCutaneous at bedtime  insulin lispro (ADMELOG) corrective regimen sliding scale   SubCutaneous three times a day before meals  insulin lispro Injectable (ADMELOG) 8 Unit(s) SubCutaneous three times a day before meals  levothyroxine 50 MICROGram(s) Oral <User Schedule>  levothyroxine 25 MICROGram(s) Oral <User Schedule>  metoprolol succinate ER 25 milliGRAM(s) Oral daily  pantoprazole    Tablet 40 milliGRAM(s) Oral before breakfast  prasugrel 10 milliGRAM(s) Oral daily    67-year-old male w/ HFrEF 15-20% s/p ICD, decreased RV function, mild MR/TR, DM type I w/ neuropathy and hx hypoglycemia , DL, HTN, CAD, hx MI, s/p CABG, s/p stent (2018), hx in-stent thrombosis while on Plavix, PAD s/p 3 LLE stents, TIAGO (needs CPAP auto-regulating pressure 10-16, patient doesn't use it due to claustrophobia), Psoriasis, R-AKA presenting to ED s/p fall. He states he was lying in bed at home when he fell asleep and fell off the bed. Found to have moises and mild elevation of trop     1. Mechanical Fall   -No CT evidence for acute traumatic injury within the chest, abdomen or pelvis  -No evidence of acute intracranial pathology. Stable exam since 1/13/2023.  -No evidence of acute cervical spine fracture or subluxation.  -Vitals wnl   -PT eval:pending      2.Likely Acute systolic CHF complicated by MOISES (cardiorenal) and Troponinemia  * pt denies chest pain but report orthopnea  - Telemetry            - ECG :paced rythm     -CXR: congestion with BNP 6000   - Cont asa/prasugrel   - Cont metoprolol  - has allergy to ACE and Entresto   - Lasix 60mg IV *1 (re-start lasix awaiting kidney function)   - Renal/bladder US:pending   - Trend trop   (detectable likely demand ischemia in the setting of chf)   - CT chest notable for Moderate to large bilateral pleural effusions, right greater than left. Small to moderate volume abdominopelvic ascites.  - Cardio consult:pending    3.  DM type 1  episodes of hypoglycemia at home  - Insulin protocol but watch for hypoglycemia     4.Hypothyroid   -c/w  synthroid    5. Mild hyperkalemia  - Lokelma*1   - pt is now on lasix   - Repeat BMP at 16:00       # DVT prophylaxis - lovenox   #R-LE non-weight bearing  #carb-consistent/dash diet  #full code       LOIDAFLOWER MADRIGAL  67y  [facility] ED Hold 063 A      Patient is a 67y old  Male who presents with a chief complaint of Mechanical fall (18 May 2023 14:16)      INTERVAL HPI/OVERNIGHT EVENTS:    Patient still not feeling well. reporting sob when laying down that improve with sitting down   denies any other complains for now         FAMILY HISTORY:  Family history of heart disease (Mother)    Family history of allergies  daughter      T(C): 36.4 (05-18-23 @ 16:56), Max: 36.4 (05-18-23 @ 16:56)  HR: 80 (05-19-23 @ 07:35) (76 - 82)  BP: 103/68 (05-19-23 @ 07:35) (96/60 - 103/68)  RR: 17 (05-19-23 @ 06:20) (16 - 18)  SpO2: 96% (05-19-23 @ 07:35) (95% - 96%)  Wt(kg): --Vital Signs Last 24 Hrs  T(C): 36.4 (18 May 2023 16:56), Max: 36.4 (18 May 2023 16:56)  T(F): 97.5 (18 May 2023 16:56), Max: 97.5 (18 May 2023 16:56)  HR: 80 (19 May 2023 07:35) (76 - 82)  BP: 103/68 (19 May 2023 07:35) (96/60 - 103/68)  BP(mean): 75 (19 May 2023 06:20) (69 - 75)  RR: 17 (19 May 2023 06:20) (16 - 18)  SpO2: 96% (19 May 2023 07:35) (95% - 96%)    Parameters below as of 19 May 2023 07:35  Patient On (Oxygen Delivery Method): room air        PHYSICAL EXAM:  GENERAL: NAD, well-groomed, well-developed  NERVOUS SYSTEM:  Alert & Oriented X3,  PULM: Diffuse crackles   CARDIAC: Regular rate and rhythm;   GI: Soft, Nontender, distended; Bowel sounds present  Lower ext: chronic changes , no pitting edema , right bka   Upper ext: diffuse well demarcated ulcer, dry looks like arterial ulcer     Consultant(s) Notes Reviewed:  [x ] YES  [ ] NO  Care Discussed with Consultants/Other Providers [ x] YES  [ ] NO    LABS:                            9.8    7.14  )-----------( 380      ( 19 May 2023 06:23 )             33.4   05-19    135  |  96<L>  |  59<H>  ----------------------------<  104<H>  5.7<H>   |  23  |  1.9<H>    Ca    8.7      19 May 2023 06:23  Mg     2.0     05-19    TPro  6.4  /  Alb  3.4<L>  /  TBili  1.0  /  DBili  x   /  AST  32  /  ALT  24  /  AlkPhos  119<H>  05-18            aspirin enteric coated 81 milliGRAM(s) Oral daily  dextrose 5%. 1000 milliLiter(s) IV Continuous <Continuous>  dextrose 5%. 1000 milliLiter(s) IV Continuous <Continuous>  dextrose 50% Injectable 25 Gram(s) IV Push once  dextrose 50% Injectable 12.5 Gram(s) IV Push once  dextrose 50% Injectable 25 Gram(s) IV Push once  dextrose Oral Gel 15 Gram(s) Oral once PRN  diazepam    Tablet 1 milliGRAM(s) Oral at bedtime  DULoxetine 30 milliGRAM(s) Oral <User Schedule>  glucagon  Injectable 1 milliGRAM(s) IntraMuscular once  heparin   Injectable 5000 Unit(s) SubCutaneous every 12 hours  hydrocortisone 2.5% Cream 1 Application(s) Topical two times a day  insulin glargine Injectable (LANTUS) 18 Unit(s) SubCutaneous at bedtime  insulin lispro (ADMELOG) corrective regimen sliding scale   SubCutaneous three times a day before meals  insulin lispro Injectable (ADMELOG) 8 Unit(s) SubCutaneous three times a day before meals  levothyroxine 50 MICROGram(s) Oral <User Schedule>  levothyroxine 25 MICROGram(s) Oral <User Schedule>  metoprolol succinate ER 25 milliGRAM(s) Oral daily  pantoprazole    Tablet 40 milliGRAM(s) Oral before breakfast  prasugrel 10 milliGRAM(s) Oral daily    67-year-old male w/ HFrEF 15-20% s/p ICD, decreased RV function, mild MR/TR, DM type I w/ neuropathy and hx hypoglycemia , DL, HTN, CAD, hx MI, s/p CABG, s/p stent (2018), hx in-stent thrombosis while on Plavix, PAD s/p 3 LLE stents, TIAGO (needs CPAP auto-regulating pressure 10-16, patient doesn't use it due to claustrophobia), Psoriasis, R-AKA presenting to ED s/p fall. He states he was lying in bed at home when he fell asleep and fell off the bed. Found to have moises and mild elevation of trop     1. Mechanical Fall   -No CT evidence for acute traumatic injury within the chest, abdomen or pelvis  -No evidence of acute intracranial pathology. Stable exam since 1/13/2023.  -No evidence of acute cervical spine fracture or subluxation.  -Vitals wnl   -PT eval:pending      2.Likely Acute systolic CHF complicated by MOISES (cardiorenal) and Troponinemia  * pt denies chest pain but report orthopnea  - Telemetry            - ECG :paced rythm     -CXR: congestion with BNP 6000   - Cont asa/prasugrel   - Cont metoprolol  - has allergy to ACE and Entresto   - Lasix 40mg IV *1 (re-start lasix awaiting kidney function)   - Renal/bladder US:pending   - Trend trop   (detectable likely demand ischemia in the setting of chf)   - CT chest notable for Moderate to large bilateral pleural effusions, right greater than left. Small to moderate volume abdominopelvic ascites.  - Cardio consult:pending  * Discuss with cardio pt is wet and cold. likely need pressors with lasix awaiting their eval     3.  DM type 1  episodes of hypoglycemia at home  - Insulin protocol but watch for hypoglycemia     4.Hypothyroid   -c/w  synthroid    5. Mild hyperkalemia  - Lokelma*1   - pt is now on lasix   - Repeat BMP at 16:00     6.Recent dx of RA   * pt was dx with RA due to upper ext arterial ulcer?   - Cont Methotrexate (last dose was 05/18/2023)       # DVT prophylaxis - lovenox   #R-LE non-weight bearing  #carb-consistent/dash diet  #full code       LOIDAFLOWER MADRIGAL  67y  [facility]-N ED Hold 063 A      Patient is a 67y old  Male who presents with a chief complaint of Mechanical fall (18 May 2023 14:16)      INTERVAL HPI/OVERNIGHT EVENTS:    Patient still not feeling well. reporting sob when laying down that improve with sitting down   denies any other complains for now         FAMILY HISTORY:  Family history of heart disease (Mother)    Family history of allergies  daughter      T(C): 36.4 (05-18-23 @ 16:56), Max: 36.4 (05-18-23 @ 16:56)  HR: 80 (05-19-23 @ 07:35) (76 - 82)  BP: 103/68 (05-19-23 @ 07:35) (96/60 - 103/68)  RR: 17 (05-19-23 @ 06:20) (16 - 18)  SpO2: 96% (05-19-23 @ 07:35) (95% - 96%)  Wt(kg): --Vital Signs Last 24 Hrs  T(C): 36.4 (18 May 2023 16:56), Max: 36.4 (18 May 2023 16:56)  T(F): 97.5 (18 May 2023 16:56), Max: 97.5 (18 May 2023 16:56)  HR: 80 (19 May 2023 07:35) (76 - 82)  BP: 103/68 (19 May 2023 07:35) (96/60 - 103/68)  BP(mean): 75 (19 May 2023 06:20) (69 - 75)  RR: 17 (19 May 2023 06:20) (16 - 18)  SpO2: 96% (19 May 2023 07:35) (95% - 96%)    Parameters below as of 19 May 2023 07:35  Patient On (Oxygen Delivery Method): room air        PHYSICAL EXAM:  GENERAL: NAD, well-groomed, well-developed  NERVOUS SYSTEM:  Alert & Oriented X3,  PULM: Diffuse crackles   CARDIAC: Regular rate and rhythm;   GI: Soft, Nontender, distended; Bowel sounds present  Lower ext: chronic changes , no pitting edema , right bka   Upper ext: diffuse well demarcated ulcer, dry looks like arterial ulcer     Consultant(s) Notes Reviewed:  [x ] YES  [ ] NO  Care Discussed with Consultants/Other Providers [ x] YES  [ ] NO    LABS:                            9.8    7.14  )-----------( 380      ( 19 May 2023 06:23 )             33.4   05-19    135  |  96<L>  |  59<H>  ----------------------------<  104<H>  5.7<H>   |  23  |  1.9<H>    Ca    8.7      19 May 2023 06:23  Mg     2.0     05-19    TPro  6.4  /  Alb  3.4<L>  /  TBili  1.0  /  DBili  x   /  AST  32  /  ALT  24  /  AlkPhos  119<H>  05-18            aspirin enteric coated 81 milliGRAM(s) Oral daily  dextrose 5%. 1000 milliLiter(s) IV Continuous <Continuous>  dextrose 5%. 1000 milliLiter(s) IV Continuous <Continuous>  dextrose 50% Injectable 25 Gram(s) IV Push once  dextrose 50% Injectable 12.5 Gram(s) IV Push once  dextrose 50% Injectable 25 Gram(s) IV Push once  dextrose Oral Gel 15 Gram(s) Oral once PRN  diazepam    Tablet 1 milliGRAM(s) Oral at bedtime  DULoxetine 30 milliGRAM(s) Oral <User Schedule>  glucagon  Injectable 1 milliGRAM(s) IntraMuscular once  heparin   Injectable 5000 Unit(s) SubCutaneous every 12 hours  hydrocortisone 2.5% Cream 1 Application(s) Topical two times a day  insulin glargine Injectable (LANTUS) 18 Unit(s) SubCutaneous at bedtime  insulin lispro (ADMELOG) corrective regimen sliding scale   SubCutaneous three times a day before meals  insulin lispro Injectable (ADMELOG) 8 Unit(s) SubCutaneous three times a day before meals  levothyroxine 50 MICROGram(s) Oral <User Schedule>  levothyroxine 25 MICROGram(s) Oral <User Schedule>  metoprolol succinate ER 25 milliGRAM(s) Oral daily  pantoprazole    Tablet 40 milliGRAM(s) Oral before breakfast  prasugrel 10 milliGRAM(s) Oral daily    67-year-old male w/ HFrEF 15-20% s/p ICD, decreased RV function, mild MR/TR, DM type I w/ neuropathy and hx hypoglycemia , DL, HTN, CAD, hx MI, s/p CABG, s/p stent (2018), hx in-stent thrombosis while on Plavix, PAD s/p 3 LLE stents, TIAGO (needs CPAP auto-regulating pressure 10-16, patient doesn't use it due to claustrophobia), Psoriasis, R-AKA presenting to ED s/p fall. He states he was lying in bed at home when he fell asleep and fell off the bed. Found to have moises and mild elevation of trop     1. Mechanical Fall   -No CT evidence for acute traumatic injury within the chest, abdomen or pelvis  -No evidence of acute intracranial pathology. Stable exam since 1/13/2023.  -No evidence of acute cervical spine fracture or subluxation.  -Vitals wnl   -PT eval:pending      2.Likely Acute systolic CHF complicated by MOISES (cardiorenal) and Troponinemia  * pt denies chest pain but report orthopnea  - Telemetry            - ECG :paced rythm     -CXR: congestion with BNP 6000   - Cont asa/prasugrel   - Cont metoprolol  - has allergy to ACE and Entresto   - Lasix 40mg IV *1 (re-start lasix awaiting kidney function)   - Renal/bladder US:pending   - Trend trop   (detectable likely demand ischemia in the setting of chf)   - CT chest notable for Moderate to large bilateral pleural effusions, right greater than left. Small to moderate volume abdominopelvic ascites.  - Cardio consult:pending  * Discuss with cardio pt is wet and cold. likely need pressors with lasix awaiting their eval     3.  DM type 1  episodes of hypoglycemia at home  - Insulin protocol but watch for hypoglycemia     4.Hypothyroid   -c/w  synthroid    5. Mild hyperkalemia  - Lokelma*1   - pt is now on lasix   - Repeat BMP at 16:00     6.Recent dx of RA   * pt was dx with RA due to upper ext arterial ulcer?   - Cont Methotrexate (last dose was 05/18/2023)       # DVT prophylaxis - lovenox   #R-LE non-weight bearing  #carb-consistent/dash diet  #full code    I will also get palliative care to evaluate him        LOIDAFLOWER MADRIGAL  67y  [facility] ED Hold 0[address]      Patient is a 67y old  Male who presents with a chief complaint of Mechanical fall (18 May 2023 14:16)      INTERVAL HPI/OVERNIGHT EVENTS:    Patient still not feeling well. reporting sob when laying down that improve with sitting down   denies any other complains for now         FAMILY HISTORY:  Family history of heart disease (Mother)    Family history of allergies  daughter      T(C): 36.4 (05-18-23 @ 16:56), Max: 36.4 (05-18-23 @ 16:56)  HR: 80 (05-19-23 @ 07:35) (76 - 82)  BP: 103/68 (05-19-23 @ 07:35) (96/60 - 103/68)  RR: 17 (05-19-23 @ 06:20) (16 - 18)  SpO2: 96% (05-19-23 @ 07:35) (95% - 96%)  Wt(kg): --Vital Signs Last 24 Hrs  T(C): 36.4 (18 May 2023 16:56), Max: 36.4 (18 May 2023 16:56)  T(F): 97.5 (18 May 2023 16:56), Max: 97.5 (18 May 2023 16:56)  HR: 80 (19 May 2023 07:35) (76 - 82)  BP: 103/68 (19 May 2023 07:35) (96/60 - 103/68)  BP(mean): 75 (19 May 2023 06:20) (69 - 75)  RR: 17 (19 May 2023 06:20) (16 - 18)  SpO2: 96% (19 May 2023 07:35) (95% - 96%)    Parameters below as of 19 May 2023 07:35  Patient On (Oxygen Delivery Method): room air        PHYSICAL EXAM:  GENERAL: NAD, well-groomed, well-developed  NERVOUS SYSTEM:  Alert & Oriented X3,  PULM: Diffuse crackles   CARDIAC: Regular rate and rhythm;   GI: Soft, Nontender, distended; Bowel sounds present  Lower ext: chronic changes , no pitting edema , right bka   Upper ext: diffuse well demarcated ulcer, dry looks like arterial ulcer     Consultant(s) Notes Reviewed:  [x ] YES  [ ] NO  Care Discussed with Consultants/Other Providers [ x] YES  [ ] NO    LABS:                            9.8    7.14  )-----------( 380      ( 19 May 2023 06:23 )             33.4   05-19    135  |  96<L>  |  59<H>  ----------------------------<  104<H>  5.7<H>   |  23  |  1.9<H>    Ca    8.7      19 May 2023 06:23  Mg     2.0     05-19    TPro  6.4  /  Alb  3.4<L>  /  TBili  1.0  /  DBili  x   /  AST  32  /  ALT  24  /  AlkPhos  119<H>  05-18            aspirin enteric coated 81 milliGRAM(s) Oral daily  dextrose 5%. 1000 milliLiter(s) IV Continuous <Continuous>  dextrose 5%. 1000 milliLiter(s) IV Continuous <Continuous>  dextrose 50% Injectable 25 Gram(s) IV Push once  dextrose 50% Injectable 12.5 Gram(s) IV Push once  dextrose 50% Injectable 25 Gram(s) IV Push once  dextrose Oral Gel 15 Gram(s) Oral once PRN  diazepam    Tablet 1 milliGRAM(s) Oral at bedtime  DULoxetine 30 milliGRAM(s) Oral <User Schedule>  glucagon  Injectable 1 milliGRAM(s) IntraMuscular once  heparin   Injectable 5000 Unit(s) SubCutaneous every 12 hours  hydrocortisone 2.5% Cream 1 Application(s) Topical two times a day  insulin glargine Injectable (LANTUS) 18 Unit(s) SubCutaneous at bedtime  insulin lispro (ADMELOG) corrective regimen sliding scale   SubCutaneous three times a day before meals  insulin lispro Injectable (ADMELOG) 8 Unit(s) SubCutaneous three times a day before meals  levothyroxine 50 MICROGram(s) Oral <User Schedule>  levothyroxine 25 MICROGram(s) Oral <User Schedule>  metoprolol succinate ER 25 milliGRAM(s) Oral daily  pantoprazole    Tablet 40 milliGRAM(s) Oral before breakfast  prasugrel 10 milliGRAM(s) Oral daily    67-year-old male w/ HFrEF 15-20% s/p ICD, decreased RV function, mild MR/TR, DM type I w/ neuropathy and hx hypoglycemia , DL, HTN, CAD, hx MI, s/p CABG, s/p stent (2018), hx in-stent thrombosis while on Plavix, PAD s/p 3 LLE stents, TIAGO (needs CPAP auto-regulating pressure 10-16, patient doesn't use it due to claustrophobia), Psoriasis, R-AKA presenting to ED s/p fall. He states he was lying in bed at home when he fell asleep and fell off the bed. Found to have moises and mild elevation of trop     1. Mechanical Fall   -No CT evidence for acute traumatic injury within the chest, abdomen or pelvis  -No evidence of acute intracranial pathology. Stable exam since 1/13/2023.  -No evidence of acute cervical spine fracture or subluxation.  -Vitals wnl   -PT eval:pending      2.Likely Acute systolic CHF complicated by MOISES (cardiorenal) and Troponinemia  * pt denies chest pain but report orthopnea  - Telemetry            - ECG :paced rythm     -CXR: congestion with BNP 6000   - Cont asa/prasugrel   - Cont metoprolol  - has allergy to ACE and Entresto   - Lasix 40mg IV *1 ordered was not given initially due to low bp. Start midodrine followed by lasix 40mg IV*1 (discuss with cardio)   - Loja for strict I&O   - Renal/bladder US:pending   - Trend trop   (detectable likely demand ischemia in the setting of chf)   - CT chest notable for Moderate to large bilateral pleural effusions, right greater than left. Small to moderate volume abdominopelvic ascites.  - Cardio consult:pending  - Nephro consult:pending  * Discuss with cardio pt is wet and cold. likely need pressors with lasix awaiting their eval     3.  DM type 1  episodes of hypoglycemia at home  - Insulin protocol but watch for hypoglycemia     4.Hypothyroid   -c/w  synthroid    5. Mild hyperkalemia  - Lokelma*1   - pt is now on lasix   - Repeat BMP at 16:00     6.Recent dx of RA   * pt was dx with RA due to upper ext arterial ulcer?   - Cont Methotrexate (last dose was 05/18/2023)       # DVT prophylaxis - lovenox   #R-LE non-weight bearing  #carb-consistent/dash diet  #full code    I will also get palliative care to evaluate him        LOIDAFLOWER MADRIGAL  67y  [facility] ED Hold 063 A      Patient is a 67y old  Male who presents with a chief complaint of Mechanical fall (18 May 2023 14:16)      INTERVAL HPI/OVERNIGHT EVENTS:    Patient still not feeling well. reporting sob when laying down that improve with sitting down   denies any other complains for now         FAMILY HISTORY:  Family history of heart disease (Mother)    Family history of allergies  daughter      T(C): 36.4 (05-18-23 @ 16:56), Max: 36.4 (05-18-23 @ 16:56)  HR: 80 (05-19-23 @ 07:35) (76 - 82)  BP: 103/68 (05-19-23 @ 07:35) (96/60 - 103/68)  RR: 17 (05-19-23 @ 06:20) (16 - 18)  SpO2: 96% (05-19-23 @ 07:35) (95% - 96%)  Wt(kg): --Vital Signs Last 24 Hrs  T(C): 36.4 (18 May 2023 16:56), Max: 36.4 (18 May 2023 16:56)  T(F): 97.5 (18 May 2023 16:56), Max: 97.5 (18 May 2023 16:56)  HR: 80 (19 May 2023 07:35) (76 - 82)  BP: 103/68 (19 May 2023 07:35) (96/60 - 103/68)  BP(mean): 75 (19 May 2023 06:20) (69 - 75)  RR: 17 (19 May 2023 06:20) (16 - 18)  SpO2: 96% (19 May 2023 07:35) (95% - 96%)    Parameters below as of 19 May 2023 07:35  Patient On (Oxygen Delivery Method): room air        PHYSICAL EXAM:  GENERAL: NAD, well-groomed, well-developed  NERVOUS SYSTEM:  Alert & Oriented X3,  PULM: Diffuse crackles   CARDIAC: Regular rate and rhythm;   GI: Soft, Nontender, distended; Bowel sounds present  Lower ext: chronic changes , no pitting edema , right bka   Upper ext: diffuse well demarcated ulcer, dry looks like arterial ulcer     Consultant(s) Notes Reviewed:  [x ] YES  [ ] NO  Care Discussed with Consultants/Other Providers [ x] YES  [ ] NO    LABS:                            9.8    7.14  )-----------( 380      ( 19 May 2023 06:23 )             33.4   05-19    135  |  96<L>  |  59<H>  ----------------------------<  104<H>  5.7<H>   |  23  |  1.9<H>    Ca    8.7      19 May 2023 06:23  Mg     2.0     05-19    TPro  6.4  /  Alb  3.4<L>  /  TBili  1.0  /  DBili  x   /  AST  32  /  ALT  24  /  AlkPhos  119<H>  05-18            aspirin enteric coated 81 milliGRAM(s) Oral daily  dextrose 5%. 1000 milliLiter(s) IV Continuous <Continuous>  dextrose 5%. 1000 milliLiter(s) IV Continuous <Continuous>  dextrose 50% Injectable 25 Gram(s) IV Push once  dextrose 50% Injectable 12.5 Gram(s) IV Push once  dextrose 50% Injectable 25 Gram(s) IV Push once  dextrose Oral Gel 15 Gram(s) Oral once PRN  diazepam    Tablet 1 milliGRAM(s) Oral at bedtime  DULoxetine 30 milliGRAM(s) Oral <User Schedule>  glucagon  Injectable 1 milliGRAM(s) IntraMuscular once  heparin   Injectable 5000 Unit(s) SubCutaneous every 12 hours  hydrocortisone 2.5% Cream 1 Application(s) Topical two times a day  insulin glargine Injectable (LANTUS) 18 Unit(s) SubCutaneous at bedtime  insulin lispro (ADMELOG) corrective regimen sliding scale   SubCutaneous three times a day before meals  insulin lispro Injectable (ADMELOG) 8 Unit(s) SubCutaneous three times a day before meals  levothyroxine 50 MICROGram(s) Oral <User Schedule>  levothyroxine 25 MICROGram(s) Oral <User Schedule>  metoprolol succinate ER 25 milliGRAM(s) Oral daily  pantoprazole    Tablet 40 milliGRAM(s) Oral before breakfast  prasugrel 10 milliGRAM(s) Oral daily    67-year-old male w/ HFrEF 15-20% s/p ICD, decreased RV function, mild MR/TR, DM type I w/ neuropathy and hx hypoglycemia , DL, HTN, CAD, hx MI, s/p CABG, s/p stent (2018), hx in-stent thrombosis while on Plavix, PAD s/p 3 LLE stents, TIAGO (needs CPAP auto-regulating pressure 10-16, patient doesn't use it due to claustrophobia), Psoriasis, R-AKA presenting to ED s/p fall. He states he was lying in bed at home when he fell asleep and fell off the bed. Found to have moises and mild elevation of trop     1. Mechanical Fall   -No CT evidence for acute traumatic injury within the chest, abdomen or pelvis  -No evidence of acute intracranial pathology. Stable exam since 1/13/2023.  -No evidence of acute cervical spine fracture or subluxation.  -Vitals wnl   -PT eval:pending      2.Likely Acute systolic CHF complicated by MOISES (cardiorenal) and Troponinemia  * pt denies chest pain but report orthopnea  - Telemetry            - ECG :paced rythm     -CXR: congestion with BNP 6000   - Cont asa/prasugrel   - Cont metoprolol  - has allergy to ACE and Entresto   - Lasix 40mg IV *1 ordered was not given initially due to low bp. Start midodrine followed by lasix 40mg IV*1 (discuss with cardio)   - Loja for strict I&O   - Renal/bladder US:pending   - Trend trop   (detectable likely demand ischemia in the setting of chf)   - CT chest notable for Moderate to large bilateral pleural effusions, right greater than left. Small to moderate volume abdominopelvic ascites.  - Cardio consult:pending  - Nephro consult:pending  * Discuss with cardio pt is wet and cold but they're thinking about hepatorenal but LFT is nl except for elevated INR.     3.  DM type 1 complicated by hypoglycemia  episodes of hypoglycemia at home  - Insulin protocol lantus and nutritional insulin was decreased.     4.Hypothyroid   -c/w  synthroid    5. Mild hyperkalemia  - Lokelma*1   - pt is now on lasix   - Repeat BMP at 16:00     6.Recent dx of RA   * pt was dx with RA due to upper ext arterial ulcer?   - Cont Methotrexate (last dose was 05/18/2023)       # DVT prophylaxis - lovenox   #R-LE non-weight bearing  #carb-consistent/dash diet  #full code    I will also get palliative care to evaluate him

## 2023-05-19 NOTE — CONSULT NOTE ADULT - SUBJECTIVE AND OBJECTIVE BOX
Patient is a 67y old  Male who presents with a chief complaint of Mechanical fall       HPI:  67-year-old male w/ HFrEF 15-20% s/p ICD, decreased RV function, mild MR/TR, DM type I w/ neuropathy and hx hypoglycemia , DL, HTN, CAD, hx MI, s/p CABG, s/p stent (2018), hx in-stent thrombosis while on Plavix, PAD s/p 3 LLE stents, TIAGO (needs CPAP auto-regulating pressure 10-16, patient doesn't use it due to claustrophobia), Psoriasis, R-AKA presenting to ED s/p fall. He states he was lying in bed at home when he fell asleep and fell off the bed, and complains of generalized headache neck pain, non-radiating, no alleviating or aggravating factors, associated with right anterior chest wall pain. Denies LOC,  fevers, chills, nausea, vomiting, dizziness, abdominal pain, chest pain or shortness of breath.              (18 May 2023 14:16)      PAST MEDICAL & SURGICAL HISTORY:  Status post percutaneous transluminal coronary angioplasty  2 stents      Atherosclerosis of coronary artery  CAD (coronary artery disease)      Osteoarthritis      HLD (hyperlipidemia)      Diabetes  on insulin pump      CHF (congestive heart failure)  EF ~ 25%      History of celiac disease      Diverticulitis      STEMI (ST elevation myocardial infarction)      Diabetic neuropathy  Hands and Feet      Anxiety and depression      Other postprocedural status  Fixation hardware in foot LEFT      Stented coronary artery  10/18 heart attack  INFERIOR WALL MI      S/P CABG x 1        S/P TKR (total knee replacement), right  with infection Mrsa   per pt he was cleared from MRSA infection      Surgery, elective  right knee wound debridement      History of open reduction and internal fixation (ORIF) procedure  right hip      H/O shoulder surgery  right      AICD (automatic cardioverter/defibrillator) present  [facility]      Cholecystostomy care  drain inserted  & removed 4 months ago      History of tonsillectomy      History of hip replacement, total, right      Elective surgery  plastic surgery Left shin          PREVIOUS DIAGNOSTIC TESTING:      ECHO  FINDINGS:    STRESS  FINDINGS:    CATHETERIZATION  FINDINGS:    MEDICATIONS  (STANDING):  aspirin enteric coated 81 milliGRAM(s) Oral daily  dextrose 5%. 1000 milliLiter(s) (100 mL/Hr) IV Continuous <Continuous>  dextrose 5%. 1000 milliLiter(s) (50 mL/Hr) IV Continuous <Continuous>  dextrose 50% Injectable 25 Gram(s) IV Push once  dextrose 50% Injectable 12.5 Gram(s) IV Push once  dextrose 50% Injectable 25 Gram(s) IV Push once  diazepam    Tablet 1 milliGRAM(s) Oral at bedtime  DULoxetine 30 milliGRAM(s) Oral <User Schedule>  glucagon  Injectable 1 milliGRAM(s) IntraMuscular once  heparin   Injectable 5000 Unit(s) SubCutaneous every 12 hours  hydrocortisone 2.5% Cream 1 Application(s) Topical two times a day  insulin lispro (ADMELOG) corrective regimen sliding scale   SubCutaneous three times a day before meals  insulin lispro Injectable (ADMELOG) 5 Unit(s) SubCutaneous three times a day before meals  levothyroxine 50 MICROGram(s) Oral <User Schedule>  levothyroxine 25 MICROGram(s) Oral <User Schedule>  metoprolol succinate ER 25 milliGRAM(s) Oral daily  midodrine. 5 milliGRAM(s) Oral three times a day  pantoprazole    Tablet 40 milliGRAM(s) Oral before breakfast  prasugrel 10 milliGRAM(s) Oral daily  sodium polystyrene sulfonate Suspension 15 Gram(s) Oral once  sodium zirconium cyclosilicate 10 Gram(s) Oral once    MEDICATIONS  (PRN):  dextrose Oral Gel 15 Gram(s) Oral once PRN Blood Glucose LESS THAN 70 milliGRAM(s)/deciliter      FAMILY HISTORY:  Family history of heart disease (Mother)    Family history of allergies  daughter        SOCIAL HISTORY:  CIGARETTES:    ALCOHOL:    REVIEW OF SYSTEMS:  CONSTITUTIONAL: No fever, weight loss, or fatigue  NECK: No pain or stiffness  RESPIRATORY: No cough, wheezing, chills or hemoptysis; No shortness of breath  CARDIOVASCULAR: No chest pain, palpitations, dizziness, or leg swelling  GASTROINTESTINAL: No abdominal or epigastric pain. No nausea, vomiting, or hematemesis; No diarrhea or constipation. No melena or hematochezia.  GENITOURINARY: No dysuria, frequency, hematuria, or incontinence  NEUROLOGICAL: No headaches, memory loss, loss of strength, numbness, or tremors  SKIN: No itching, burning, rashes, or lesions   ENDOCRINE: No heat or cold intolerance; No hair loss  MUSCULOSKELETAL: No joint pain or swelling; No muscle, back, or extremity pain  HEME/LYMPH: No easy bruising, or bleeding gums          Vital Signs Last 24 Hrs  T(C): 36.5 (19 May 2023 22:03), Max: 36.5 (19 May 2023 16:40)  T(F): 97.7 (19 May 2023 22:03), Max: 97.7 (19 May 2023 16:40)  HR: 71 (19 May 2023 22:03) (71 - 84)  BP: 108/62 (19 May 2023 22:03) (78/50 - 108/62)  BP(mean): 78 (19 May 2023 22:03) (70 - 78)  RR: 18 (19 May 2023 22:03) (17 - 18)  SpO2: 95% (19 May 2023 22:03) (95% - 99%)    Parameters below as of 19 May 2023 22:03  Patient On (Oxygen Delivery Method): room air            PHYSICAL EXAM:  GENERAL: Cachectic   HEAD:  Atraumatic, Normocephalic  NECK: Supple, No JVD, Normal thyroid  NERVOUS SYSTEM:  Alert & Oriented X3, Good concentration  CHEST/LUNG: bilateral bibasilar decreased air entry   HEART: Regular rate and rhythm;  ABDOMEN: Soft, Nontender, ascites noted   EXTREMITIES: no edema Left AKA      INTERPRETATION OF TELEMETRY:    ECG:    I&O's Detail    19 May 2023 07:01  -  19 May 2023 22:48  --------------------------------------------------------  IN:  Total IN: 0 mL    OUT:    Indwelling Catheter - Urethral (mL): 250 mL  Total OUT: 250 mL    Total NET: -250 mL          LABS:                        9.8    7.14  )-----------( 380      ( 19 May 2023 06:23 )             33.4     05-    135  |  96<L>  |  62<HH>  ----------------------------<  87  5.5<H>   |  21  |  2.2<H>    Ca    8.7      19 May 2023 17:36  Mg     2.0     -    TPro  6.4  /  Alb  3.4<L>  /  TBili  1.0  /  DBili  x   /  AST  32  /  ALT  24  /  AlkPhos  119<H>  05-18    CARDIAC MARKERS ( 18 May 2023 21:12 )  x     / 0.09 ng/mL / x     / x     / x      CARDIAC MARKERS ( 18 May 2023 17:08 )  x     / 0.10 ng/mL / x     / x     / x      CARDIAC MARKERS ( 18 May 2023 09:30 )  x     / 0.10 ng/mL / x     / x     / x          PT/INR - ( 18 May 2023 09:30 )   PT: 22.70 sec;   INR: 1.95 ratio         PTT - ( 18 May 2023 09:30 )  PTT:26.5 sec  Urinalysis Basic - ( 18 May 2023 12:20 )    Color: Yellow / Appearance: Clear / S.031 / pH: x  Gluc: x / Ketone: Negative  / Bili: Negative / Urobili: <2 mg/dL   Blood: x / Protein: 30 mg/dL / Nitrite: Negative   Leuk Esterase: Large / RBC: 2 /HPF / WBC 36 /HPF   Sq Epi: x / Non Sq Epi: x / Bacteria: Negative      I&O's Summary    19 May 2023 07:01  -  19 May 2023 22:48  --------------------------------------------------------  IN: 0 mL / OUT: 250 mL / NET: -250 mL        RADIOLOGY & ADDITIONAL STUDIES:        metoprolol succinate ER 25 milliGRAM(s) Oral daily  midodrine. 5 milliGRAM(s) Oral three times a day

## 2023-05-19 NOTE — CONSULT NOTE ADULT - ASSESSMENT
HFrEF  Ischemic cardiomyopathy   Type II MI   MOISES  Ascites  Pleural effusion    - Management as per primary team  - Consult renal  - I/O gentle diureses  - Device interrogation  - Will follow as needed

## 2023-05-20 NOTE — PATIENT PROFILE ADULT - FALL HARM RISK - HARM RISK INTERVENTIONS
Assistance with ambulation/Assistance OOB with selected safe patient handling equipment/Communicate Risk of Fall with Harm to all staff/Discuss with provider need for PT consult/Monitor gait and stability/Reinforce activity limits and safety measures with patient and family/Tailored Fall Risk Interventions/Visual Cue: Yellow wristband and red socks/Bed in lowest position, wheels locked, appropriate side rails in place/Call bell, personal items and telephone in reach/Instruct patient to call for assistance before getting out of bed or chair/Non-slip footwear when patient is out of bed/[place] to call system/Physically safe environment - no spills, clutter or unnecessary equipment/Purposeful Proactive Rounding/Room/bathroom lighting operational, light cord in reach

## 2023-05-20 NOTE — CONSULT NOTE ADULT - ASSESSMENT
68 yo male, PMHx of HFrEF 15-20% s/p ICD, decreased RV function, mild MR/TR, DM type I, DL, HTN, CAD, hx MI, s/p CABG, s/p stent (2018), PAD s/p 3 LLE stents, TIAGO, Psorias, presents s/p fall  called for MOISES / stage 3b ckd  #? etiology   # patient hypotensive / started on midodrine / ? upgrade to unit / last echoin 2/23 EF in the 20 % , grade 3 DD  # cardiology f/up ? need for swan jorge  # b/l pleural effusions / pulmonary evaluation ? tap   #u sodium < 20/ in favor of decrease effective renal blood flow / prerenal ? CRS   #repeat K/L/ IF  # d/c kayexalate   # start lokelma 10 q 24  # CT : left renal pelvis dilatation with ? UPJ obstruction / sono noted needs  evaluation  # last OBED postivie , repeat check DNA  # no acute indication for RRT  will follow

## 2023-05-20 NOTE — PROGRESS NOTE ADULT - SUBJECTIVE AND OBJECTIVE BOX
SUBJECTIVE:    Patient is a 67y old Male who presents with a chief complaint of Mechanical fall (19 May 2023 22:48)    Currently admitted to medicine with the primary diagnosis of Fall       Today is hospital day 2d. This morning he is resting comfortably in bed and reports no new issues or overnight events.     PAST MEDICAL & SURGICAL HISTORY  Status post percutaneous transluminal coronary angioplasty  2 stents    Atherosclerosis of coronary artery  CAD (coronary artery disease)    Osteoarthritis    HLD (hyperlipidemia)    Diabetes  on insulin pump    CHF (congestive heart failure)  EF ~ 25%    History of celiac disease    Diverticulitis    STEMI (ST elevation myocardial infarction)    Diabetic neuropathy  Hands and Feet    Anxiety and depression    Other postprocedural status  Fixation hardware in foot LEFT    Stented coronary artery  10/18 heart attack  INFERIOR WALL MI    S/P CABG x 1      S/P TKR (total knee replacement), right  with infection Mrsa   per pt he was cleared from MRSA infection    Surgery, elective  right knee wound debridement    History of open reduction and internal fixation (ORIF) procedure  right hip    H/O shoulder surgery  right    AICD (automatic cardioverter/defibrillator) present  St Kali    Cholecystostomy care  drain inserted  & removed 4 months ago    History of tonsillectomy    History of hip replacement, total, right    Elective surgery  plastic surgery Left shin      SOCIAL HISTORY:  Negative for smoking/alcohol/drug use.     ALLERGIES:  ACE inhibitors (Rash)  Entresto (Swelling)  Atorvastatin Calcium (Unknown)  Bactrim (Unknown)  Plavix (Unknown)    MEDICATIONS:  STANDING MEDICATIONS  aspirin enteric coated 81 milliGRAM(s) Oral daily  dextrose 5% + sodium chloride 0.9%. 1000 milliLiter(s) IV Continuous <Continuous>  dextrose 5%. 1000 milliLiter(s) IV Continuous <Continuous>  dextrose 5%. 1000 milliLiter(s) IV Continuous <Continuous>  dextrose 50% Injectable 25 Gram(s) IV Push once  dextrose 50% Injectable 12.5 Gram(s) IV Push once  dextrose 50% Injectable 25 Gram(s) IV Push once  diazepam    Tablet 1 milliGRAM(s) Oral at bedtime  DULoxetine 30 milliGRAM(s) Oral <User Schedule>  glucagon  Injectable 1 milliGRAM(s) IntraMuscular once  heparin   Injectable 5000 Unit(s) SubCutaneous every 12 hours  hydrocortisone 2.5% Cream 1 Application(s) Topical two times a day  insulin glargine Injectable (LANTUS) 12 Unit(s) SubCutaneous at bedtime  insulin lispro (ADMELOG) corrective regimen sliding scale   SubCutaneous three times a day before meals  lactulose Syrup 10 Gram(s) Oral at bedtime  levothyroxine 50 MICROGram(s) Oral <User Schedule>  levothyroxine 25 MICROGram(s) Oral <User Schedule>  metoprolol succinate ER 25 milliGRAM(s) Oral daily  midodrine. 10 milliGRAM(s) Oral three times a day  pantoprazole    Tablet 40 milliGRAM(s) Oral before breakfast  prasugrel 10 milliGRAM(s) Oral daily  sodium polystyrene sulfonate Suspension 15 Gram(s) Oral once    PRN MEDICATIONS  dextrose Oral Gel 15 Gram(s) Oral once PRN    VITALS:   T(F): 97.5  HR: 61  BP: 89/58  RR: 18  SpO2: 95%    PHYSICAL EXAM:  GEN: No acute distress  LUNGS: Clear to auscultation bilaterally   HEART: S1/S2 present.   ABD: Soft, non-tender, non-distended. Bowel sounds present      LABS:                        9.8    6.56  )-----------( 390      ( 20 May 2023 07:01 )             33.3     05-20    133<L>  |  93<L>  |  66<HH>  ----------------------------<  63<L>  5.2<H>   |  26  |  2.6<H>    Ca    8.5      20 May 2023 07:01  Mg     2.0     05-20    TPro  6.2  /  Alb  3.2<L>  /  TBili  1.4<H>  /  DBili  x   /  AST  94<H>  /  ALT  63<H>  /  AlkPhos  125<H>  05-20    PT/INR - ( 20 May 2023 07:01 )   PT: 26.80 sec;   INR: 2.29 ratio         PTT - ( 20 May 2023 07:01 )  PTT:30.9 sec  Urinalysis Basic - ( 18 May 2023 12:20 )    Color: Yellow / Appearance: Clear / S.031 / pH: x  Gluc: x / Ketone: Negative  / Bili: Negative / Urobili: <2 mg/dL   Blood: x / Protein: 30 mg/dL / Nitrite: Negative   Leuk Esterase: Large / RBC: 2 /HPF / WBC 36 /HPF   Sq Epi: x / Non Sq Epi: x / Bacteria: Negative              CARDIAC MARKERS ( 18 May 2023 21:12 )  x     / 0.09 ng/mL / x     / x     / x      CARDIAC MARKERS ( 18 May 2023 17:08 )  x     / 0.10 ng/mL / x     / x     / x            RADIOLOGY:

## 2023-05-20 NOTE — CONSULT NOTE ADULT - SUBJECTIVE AND OBJECTIVE BOX
66 yo male, PMHx of HFrEF 15-20% s/p ICD, decreased RV function, mild MR/TR, DM type I, DL, HTN, CAD, hx MI, s/p CABG, s/p stent (2018), PAD s/p 3 LLE stents, TIAGO, Psorias, presents s/p fall  called for MOISES / stage 3b ckd    PAST HISTORY  --------------------------------------------------------------------------------  PAST MEDICAL & SURGICAL HISTORY:  Status post percutaneous transluminal coronary angioplasty  2 stents      Atherosclerosis of coronary artery  CAD (coronary artery disease)      Osteoarthritis      HLD (hyperlipidemia)      Diabetes  on insulin pump      CHF (congestive heart failure)  EF ~ 25%      History of celiac disease      Diverticulitis      STEMI (ST elevation myocardial infarction)      Diabetic neuropathy  Hands and Feet      Anxiety and depression      Other postprocedural status  Fixation hardware in foot LEFT      Stented coronary artery  10/18 heart attack  INFERIOR WALL MI      S/P CABG x 1  2018      S/P TKR (total knee replacement), right  with infection Mrsa   per pt he was cleared from MRSA infection      Surgery, elective  right knee wound debridement      History of open reduction and internal fixation (ORIF) procedure  right hip      H/O shoulder surgery  right      AICD (automatic cardioverter/defibrillator) present  St Kali      Cholecystostomy care  drain inserted 2020 & removed 4 months ago      History of tonsillectomy      History of hip replacement, total, right      Elective surgery  plastic surgery Left shin        FAMILY HISTORY:  Family history of heart disease (Mother)    Family history of allergies  daughter      PAST SOCIAL HISTORY:    ALLERGIES & MEDICATIONS  --------------------------------------------------------------------------------  Allergies    ACE inhibitors (Rash)  Entresto (Swelling)  Atorvastatin Calcium (Unknown)  Bactrim (Unknown)  Plavix (Unknown)    Intolerances      Standing Inpatient Medications  aspirin enteric coated 81 milliGRAM(s) Oral daily  dextrose 5% + sodium chloride 0.9%. 1000 milliLiter(s) IV Continuous <Continuous>  dextrose 5%. 1000 milliLiter(s) IV Continuous <Continuous>  dextrose 5%. 1000 milliLiter(s) IV Continuous <Continuous>  dextrose 50% Injectable 25 Gram(s) IV Push once  dextrose 50% Injectable 12.5 Gram(s) IV Push once  dextrose 50% Injectable 25 Gram(s) IV Push once  diazepam    Tablet 1 milliGRAM(s) Oral at bedtime  DULoxetine 30 milliGRAM(s) Oral <User Schedule>  glucagon  Injectable 1 milliGRAM(s) IntraMuscular once  heparin   Injectable 5000 Unit(s) SubCutaneous every 12 hours  hydrocortisone 2.5% Cream 1 Application(s) Topical two times a day  insulin glargine Injectable (LANTUS) 12 Unit(s) SubCutaneous at bedtime  insulin lispro (ADMELOG) corrective regimen sliding scale   SubCutaneous three times a day before meals  lactulose Syrup 10 Gram(s) Oral at bedtime  levothyroxine 25 MICROGram(s) Oral <User Schedule>  levothyroxine 50 MICROGram(s) Oral <User Schedule>  metoprolol succinate ER 25 milliGRAM(s) Oral daily  midodrine. 10 milliGRAM(s) Oral three times a day  pantoprazole    Tablet 40 milliGRAM(s) Oral before breakfast  prasugrel 10 milliGRAM(s) Oral daily  sodium polystyrene sulfonate Suspension 15 Gram(s) Oral once    PRN Inpatient Medications  dextrose Oral Gel 15 Gram(s) Oral once PRN        VITALS/PHYSICAL EXAM  --------------------------------------------------------------------------------  T(C): 36.6 (05-20-23 @ 13:38), Max: 36.6 (05-20-23 @ 13:38)  HR: 77 (05-20-23 @ 13:38) (61 - 84)  BP: 107/58 (05-20-23 @ 13:38) (78/50 - 108/62)  RR: 18 (05-20-23 @ 13:38) (17 - 18)  SpO2: 95% (05-19-23 @ 22:03) (95% - 99%)  Wt(kg): --    Weight (kg): 80.2 (05-19-23 @ 22:03)      05-19-23 @ 07:01  -  05-20-23 @ 07:00  --------------------------------------------------------  IN: 845 mL / OUT: 450 mL / NET: 395 mL    05-20-23 @ 07:01  -  05-20-23 @ 14:20  --------------------------------------------------------  IN: 210 mL / OUT: 30 mL         LABS/STUDIES  --------------------------------------------------------------------------------              9.8    6.56  >-----------<  390      [05-20-23 @ 07:01]              33.3     133  |  93  |  66  ----------------------------<  63      [05-20-23 @ 07:01]  5.2   |  26  |  2.6        Ca     8.5     [05-20-23 @ 07:01]      Mg     2.0     [05-20-23 @ 07:01]    TPro  6.2  /  Alb  3.2  /  TBili  1.4  /  DBili  x   /  AST  94  /  ALT  63  /  AlkPhos  125  [05-20-23 @ 07:01]    PT/INR: PT 26.80, INR 2.29       [05-20-23 @ 07:01]  PTT: 30.9       [05-20-23 @ 07:01]    Troponin 0.09      [05-18-23 @ 21:12]    Creatinine Trend:  SCr 2.6 [05-20 @ 07:01]  SCr 2.2 [05-19 @ 17:36]  SCr 1.9 [05-19 @ 06:23]  SCr 1.8 [05-18 @ 09:30]  SCr 1.1 [05-01 @ 15:19]    Urinalysis - [05-20-23 @ 11:00]      Color Yellow / Appearance Slightly Turbid / SG 1.028 / pH 5.5      Gluc Negative / Ketone Negative  / Bili Negative / Urobili 3 mg/dL       Blood Large / Protein 300 mg/dL / Leuk Est Large / Nitrite Negative       /  / Hyaline 4 / Gran  / Sq Epi  / Non Sq Epi  / Bacteria Negative    Urine Sodium <20.0      [05-20-23 @ 11:00]  Urine Potassium 46      [05-20-23 @ 11:00]  Urine Osmolality 359      [05-20-23 @ 11:00]  < from: CT Abdomen and Pelvis w/ IV Cont (05.18.23 @ 11:00) >    No CT evidence for acute traumatic injury within the chest, abdomen or   pelvis.    Moderate to large bilateral pleural effusions, right greater than left.   Small to moderate volume abdominopelvic ascites.    Cardiomegaly with focal discontinuity anterior left atrial wall,   unchanged.    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    < end of copied text >    Iron 27, TIBC 342, %sat 8      [01-17-23 @ 07:36]  Ferritin 78      [01-17-23 @ 07:36]  HbA1c 7.7      [03-20-20 @ 05:49]  TSH 5.61      [01-14-23 @ 05:10]  Lipid: chol 85, TG 38, HDL 37, LDL --      [02-23-23 @ 17:04]    HBsAg Nonreact      [05-29-18 @ 17:47]  HCV 0.15, Nonreact      [10-22-19 @ 07:00]  HIV Nonreact      [01-11-23 @ 11:27]    OBED: titer 1:320, pattern Speckled      [02-23-23 @ 17:04]  C3 Complement 114      [03-01-23 @ 06:06]  C4 Complement 16      [03-01-23 @ 06:06]  Syphilis Screen (Treponema Pallidum Ab) Negative      [09-10-20 @ 10:53]  Free Light Chains: kappa 11.08, lambda 4.40, ratio = 2.52      [03-12 @ 17:00]  Immunofixation Serum:    Weak IgG Kappa Band Identified    Reference Range: None Detected      [03-12-21 @ 17:00]  Immunofixation Urine: Reference Range: None Detected      [03-13-21 @ 20:33]  UPEP Interpretation: Normal Electrophoresis Pattern      [03-13-21 @ 20:33]

## 2023-05-20 NOTE — PROGRESS NOTE ADULT - ASSESSMENT
67-year-old male w/ HFrEF 15-20% s/p ICD, decreased RV function, mild MR/TR, DM type I w/ neuropathy and hx hypoglycemia , DL, HTN, CAD, hx MI, s/p CABG, s/p stent (2018), hx in-stent thrombosis while on Plavix, PAD s/p 3 LLE stents, TIAGO (needs CPAP auto-regulating pressure 10-16, patient doesn't use it due to claustrophobia), Psoriasis, R-AKA presenting to ED s/p fall. He states he was lying in bed at home when he fell asleep and fell off the bed. Found to have moises and mild elevation of trop     1. Mechanical Fall   -No CT evidence for acute traumatic injury within the chest, abdomen or pelvis  -No evidence of acute intracranial pathology. Stable exam since 1/13/2023.  -No evidence of acute cervical spine fracture or subluxation.  -Vitals wnl   -PT eval:pending      2.Likely Acute systolic CHF complicated by MOISES (cardiorenal) and Troponinemia  * pt denies chest pain but report orthopnea  - Telemetry            - ECG :paced rythm     -CXR: congestion with BNP 6000   - Cont asa/prasugrel   - Cont metoprolol  - has allergy to ACE and Entresto   - Lasix 40mg IV *1 ordered was not given initially due to low bp. Midodrine and IV lasix   - Loja for strict I&O   - Renal/bladder US:pending   - Trend trop   (detectable likely demand ischemia in the setting of chf)   - CT chest notable for Moderate to large bilateral pleural effusions, right greater than left. Small to moderate volume abdominopelvic ascites.  - Cardio consult: done   - Nephro consult:pending  * Discuss with cardio pt is wet and cold but they're thinking about hepatorenal but LFT is nl except for elevated INR.     3.  DM type 1 complicated by hypoglycemia  episodes of hypoglycemia at home  - Insulin protocol lantus and nutritional insulin was decreased.     4.Hypothyroid   -c/w  synthroid    5. Mild hyperkalemia  - Lokelma*1   - pt is now on lasix   - Repeat BMP at 16:00     6.Recent dx of RA   * pt was dx with RA due to upper ext arterial ulcer?   - Cont Methotrexate (last dose was 05/18/2023)       # DVT prophylaxis - lovenox   #R-LE non-weight bearing  #carb-consistent/dash diet  #full code    Brenda Harmon MD  Attending Hospitalist

## 2023-05-20 NOTE — PATIENT PROFILE ADULT - FALL HARM RISK - DATE OF FALL
December 11, 2020      Kd Garcia  [address] [place] [address]        To Whom It May Concern,     Kd Garcia attended clinic here on Dec 08, 2020. Due to conditions related to her pregnancy, she will be out of school on medical leave until the start of her Spring quarter.     If you have questions or concerns, please call the clinic at the number listed above.    Sincerely,     PETER Viramontes, FAYE       18-May-2023

## 2023-05-21 NOTE — PROGRESS NOTE ADULT - SUBJECTIVE AND OBJECTIVE BOX
CHIEF COMPLAINT:    Patient is a 67y old  Male who presents with a chief complaint of Mechanical fall     INTERVAL HPI/OVERNIGHT EVENTS:    Patient seen and examined at bedside. No acute overnight events occurred.    ROS: Denies SOB, chest pain. All other systems are negative.    Medications:  Standing  aspirin enteric coated 81 milliGRAM(s) Oral daily  dextrose 5% + sodium chloride 0.9%. 1000 milliLiter(s) IV Continuous <Continuous>  dextrose 5%. 1000 milliLiter(s) IV Continuous <Continuous>  dextrose 5%. 1000 milliLiter(s) IV Continuous <Continuous>  dextrose 50% Injectable 25 Gram(s) IV Push once  dextrose 50% Injectable 25 Gram(s) IV Push once  dextrose 50% Injectable 12.5 Gram(s) IV Push once  diazepam    Tablet 1 milliGRAM(s) Oral at bedtime  DULoxetine 30 milliGRAM(s) Oral <User Schedule>  furosemide   Injectable 20 milliGRAM(s) IV Push daily  glucagon  Injectable 1 milliGRAM(s) IntraMuscular once  heparin   Injectable 5000 Unit(s) SubCutaneous every 12 hours  hydrocortisone 2.5% Cream 1 Application(s) Topical two times a day  insulin glargine Injectable (LANTUS) 12 Unit(s) SubCutaneous at bedtime  insulin lispro (ADMELOG) corrective regimen sliding scale   SubCutaneous three times a day before meals  lactulose Syrup 10 Gram(s) Oral at bedtime  levothyroxine 50 MICROGram(s) Oral <User Schedule>  levothyroxine 25 MICROGram(s) Oral <User Schedule>  metoprolol succinate ER 25 milliGRAM(s) Oral daily  midodrine. 10 milliGRAM(s) Oral three times a day  pantoprazole    Tablet 40 milliGRAM(s) Oral before breakfast  prasugrel 10 milliGRAM(s) Oral daily  sodium polystyrene sulfonate Suspension 15 Gram(s) Oral once    PRN Meds  dextrose Oral Gel 15 Gram(s) Oral once PRN        Vital Signs:    T(F): 97 (05-21-23 @ 05:44), Max: 97.8 (05-20-23 @ 13:38)  HR: 81 (05-21-23 @ 05:44) (77 - 82)  BP: 116/69 (05-21-23 @ 05:44) (90/51 - 116/69)  RR: 18 (05-21-23 @ 05:44) (18 - 18)  SpO2: --  I&O's Summary    20 May 2023 07:01  -  21 May 2023 07:00  --------------------------------------------------------  IN: 210 mL / OUT: 1680 mL / NET: -1470 mL    21 May 2023 07:01  -  21 May 2023 11:32  --------------------------------------------------------  IN: 286 mL / OUT: 0 mL / NET: 286 mL          POCT Blood Glucose.: 174 mg/dL (21 May 2023 11:02)  POCT Blood Glucose.: 138 mg/dL (21 May 2023 07:38)  POCT Blood Glucose.: 166 mg/dL (21 May 2023 05:33)  POCT Blood Glucose.: 163 mg/dL (20 May 2023 22:09)  POCT Blood Glucose.: 110 mg/dL (20 May 2023 17:05)      PHYSICAL EXAM:  GENERAL:  NAD  SKIN: Multiple healing lesions throughout "contact dermatitis" bandaged, psoriatic nail changes  HEENT: Atraumatic. Normocephalic. Anicteric  NECK:  No JVD.   PULMONARY: Clear to ausculation bilaterally. No wheezing. No rales  CVS: Normal S1, S2. Regular rate and rhythm. No murmurs.  ABDOMEN/GI: Soft, Nontender, Nondistended; Bowel sounds are present  EXTREMITIES:  R AKA, no B/L LE.  NEUROLOGIC:  No motor deficit.  PSYCH: Alert & oriented x 3, normal affect      LABS:                        9.7    6.91  )-----------( 358      ( 21 May 2023 05:26 )             33.3     05-21    134<L>  |  95<L>  |  65<HH>  ----------------------------<  131<H>  4.1   |  27  |  2.2<H>    Ca    8.5      21 May 2023 05:26  Mg     2.0     05-21    TPro  6.3  /  Alb  3.2<L>  /  TBili  1.3<H>  /  DBili  x   /  AST  67<H>  /  ALT  63<H>  /  AlkPhos  134<H>  05-21    PT/INR - ( 20 May 2023 07:01 )   PT: 26.80 sec;   INR: 2.29 ratio         PTT - ( 20 May 2023 07:01 )  PTT:30.9 sec    Trop 0.09, CKMB --, CK --, 05-18-23 @ 21:12  Trop 0.10, CKMB --, CK --, 05-18-23 @ 17:08        RADIOLOGY & ADDITIONAL TESTS:  Imaging or report Personally Reviewed:  [ ] YES  [ ] NO -->no new images    Telemetry reviewed independently - NSR, no acute events  EKG reviewed independently -->no new EKGs    Consultant(s) Notes Reviewed:  [ ] YES  [ ] NO  Care Discussed with Consultants/Other Providers [ ] YES  [ ] NO    Case discussed with resident  Care discussed with pt

## 2023-05-21 NOTE — PROGRESS NOTE ADULT - ASSESSMENT
68 yo male, PMHx of HFrEF 15-20% s/p ICD, decreased RV function, mild MR/TR, DM type I, DL, HTN, CAD, hx MI, s/p CABG, s/p stent (2018), PAD s/p 3 LLE stents, TIAGO, Psorias, presents s/p fall  called for MOISES / stage 3b ckd  # patient hypotensive / started on midodrine BP improved/ last echoin 2/23 EF in the 20 % , grade 3 DD  # cardiology f/up ? need for swan jorge  # b/l pleural effusions / pulmonary evaluation ? tap   #u sodium < 20/ in favor of decrease effective renal blood flow / likely CRS   # creat improving, consider switching lasix to po, patient on RA and FERNANDO improving  # Check CK  #repeat K/L/ IF  # d/c kayexalate   # start lokelma 10 q 24  # CT : left renal pelvis dilatation with ? UPJ obstruction / sono noted needs  evaluation  # last OBED postivie , repeat check DNA  # no acute indication for RRT  will follow 68 yo male, PMHx of HFrEF 15-20% s/p ICD, decreased RV function, mild MR/TR, DM type I, DL, HTN, CAD, hx MI, s/p CABG, s/p stent (2018), PAD s/p 3 LLE stents, TIAGO, Psorias, presents s/p fall  called for MOISES / stage 3b ckd  # patient hypotensive / started on midodrine BP improved/ last echo in 2/23 EF in the 20 % , grade 3 DD  # cardiology f/up ? need for swan jorge  # b/l pleural effusions / pulmonary evaluation ? tap   #u sodium < 20/ in favor of decrease effective renal blood flow / likely CRS   # creat improving, consider switching lasix to po, patient on RA and FERNANDO improving  # Check CK  #repeat K/L/ IF  # d/c kayexalate   # CT : left renal pelvis dilatation with ? UPJ obstruction / sono noted needs  evaluation  # last OBED positive , repeat check DNA  # no acute indication for RRT  will follow

## 2023-05-21 NOTE — PROGRESS NOTE ADULT - ASSESSMENT
68 yo M PMHx chronic HFrEF 15-20% s/p ICD, decreased RV function, mild MR/TR, DM type I w/ neuropathy and hx hypoglycemia, DLD, HTN, CAD, hx MI, s/p CABG, s/p stent (2018), hx in-stent thrombosis while on Plavix, PAD s/p 3 LLE stents, TIAGO (needs CPAP auto-regulating pressure 10-16, patient doesn't use it due to claustrophobia), Psoriasis, R-AKA presenting to ED s/p fall. He states he was lying in bed at home when he fell asleep and fell off the bed. Found to have moises and mild elevation of trop     Mechanical Fall   -No CT evidence for acute traumatic injury within the chest, abdomen or pelvis  -No evidence of acute intracranial pathology.   -No evidence of acute cervical spine fracture or subluxation.  -PT eval     Acute on chronic HFrEF  CRS  Type 2 NSTEMI due to demand ischemia in setting of MOISES  - c/w telemetry  - case d/w cardiology attending Dr. Nelson who knows patient well. Pt has hypotension which is improved on midodrine. Pt's BP is very senstivie to diuretics. cardio suspects pt may need ultrafilration soon given BP issues. HF consult requested for possible [place] Ruchi given evidence of right sided heart failure  - plan as d/w cardiology is to give one dose of lasix 20 mg. If BP decreases will need CCU evaluation and upgrade.  - c/w aspirin, prasugrel, metoprolol  - had "anaphlyatic reaction" to ACE-I (presumably angioedema?) and has allergy to ENtresto    MOISES on CKD III  - as per pt he has issues with urinary retention from time to time  No CT evidence for acute traumatic injury within the chest, abdomen or   pelvis.    Moderate to large bilateral pleural effusions, right greater than left.   Small to moderate volume abdominopelvic ascites.    Cardiomegaly with focal discontinuity anterior left atrial wall,   unchanged.    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      - RBUS showed mild   - Loja for strict I&O   - Renal/bladder US:pending   - Trend trop   (detectable likely demand ischemia in the setting of chf)   - CT chest notable for Moderate to large bilateral pleural effusions, right greater than left. Small to moderate volume abdominopelvic ascites.  - Cardio consult:pending  - Nephro consult:pending  * Discuss with cardio pt is wet and cold but they're thinking about hepatorenal but LFT is nl except for elevated INR.     3.  DM type 1 complicated by hypoglycemia  episodes of hypoglycemia at home  - Insulin protocol lantus and nutritional insulin was decreased.     4.Hypothyroid   -c/w  synthroid    5. Mild hyperkalemia  - Lokelma*1   - pt is now on lasix   - Repeat BMP at 16:00     6.Recent dx of RA   * pt was dx with RA due to upper ext arterial ulcer?   - Cont Methotrexate (last dose was 05/18/2023)       # DVT prophylaxis - lovenox   #R-LE non-weight bearing  #carb-consistent/dash diet  #full code   68 yo M PMHx chronic HFrEF 15-20% s/p ICD, decreased RV function, mild MR/TR, DM type I w/ neuropathy and hx hypoglycemia, DLD, HTN, CAD, hx MI, s/p CABG, s/p stent (2018), hx in-stent thrombosis while on Plavix, PAD s/p 3 LLE stents, TIAGO (needs CPAP auto-regulating pressure 10-16, patient doesn't use it due to claustrophobia), Psoriasis, R-AKA presenting to ED s/p fall. He states he was lying in bed at home when he fell asleep and fell off the bed. Found to have moises and mild elevation of trop     Mechanical Fall   -No CT evidence for acute traumatic injury within the chest, abdomen or pelvis  -No evidence of acute intracranial pathology.   -No evidence of acute cervical spine fracture or subluxation.  -PT eval     Acute on chronic HFrEF  CRS  Type 2 NSTEMI due to demand ischemia in setting of MOISES  Moderate to large bilateral pleural effusions, right greater than left.   Small to moderate volume abdominopelvic ascites.  - c/w telemetry  - case d/w cardiology attending Dr. Nelson who knows patient well. Pt has hypotension which is improved on midodrine. Pt's BP is very senstivie to diuretics. cardio suspects pt may need ultrafilration soon given BP issues. HF consult requested for possible [place] Ruchi given evidence of right sided heart failure  - plan as d/w cardiology is to give one dose of lasix 20 mg. If BP decreases will need CCU evaluation and upgrade.  - c/w aspirin, prasugrel, metoprolol  - had "anaphlyatic reaction" to ACE-I (presumably angioedema?) and has allergy to ENtresto  - effusion and ascites likely due to decompensated CHF. Paracentesis offered to pt to r/o SBP but he declined. Agreeable to abdominal US to further assess amt   - EKG shows atrial paced rhythm  - BNP 9,132    MOISES on CKD III  - as per pt he has issues with urinary retention from time to time  - RBUS showedMild left hydronephrosis/dilated pelvis, unchanged. Left lower pole 0.5   cm nonobstructing calculus.-->urology eval when cardiac stable  - SCr downtrending, suggests CRS  - pt has fields in place for retention/I&O    Transaminitis  - has mildly elevated LFTs including INR  - likely due to congestion  - repeat INR  - RUQ sono appreciated, no apparent hepatobiliary issues  - check ggt        DM type 1 complicated by hypoglycemia  episodes of hypoglycemia at home  - Insulin protocol lantus and nutritional insulin was decreased.     Hypothyroid   -c/w  synthroid    Mild hyperkalemia  - resolved    Recent dx of RA   * pt was dx with RA due to upper ext arterial ulcer?   - Cont Methotrexate (last dose was 05/18/2023)     Psoriasis  - pt states this is a wrong diagnosis  - nails consistent with PsA.    # DVT prophylaxis - lovenox   #R-LE non-weight bearing  #carb-consistent/dash diet  #full code    #Progress Note Handoff:  Pending (specify):  Clinial improvement  Family discussion: Spoke with pt regarding above  Disposition: Home___/SNF___/Other________/Unknown at this time____x____

## 2023-05-21 NOTE — PROGRESS NOTE ADULT - SUBJECTIVE AND OBJECTIVE BOX
SUBJECTIVE:    Patient is a 67y old Male who presents with a chief complaint of Mechanical fall (21 May 2023 11:30)    Overnight Events: Patient feeling better, SOB and LE swelling improving.  BP stable.    PAST MEDICAL & SURGICAL HISTORY  Status post percutaneous transluminal coronary angioplasty  2 stents    Atherosclerosis of coronary artery  CAD (coronary artery disease)    Osteoarthritis    HLD (hyperlipidemia)    Diabetes  on insulin pump    CHF (congestive heart failure)  EF ~ 25%    History of celiac disease    Diverticulitis    STEMI (ST elevation myocardial infarction)    Diabetic neuropathy  Hands and Feet    Anxiety and depression    Other postprocedural status  Fixation hardware in foot LEFT    Stented coronary artery  10/18 heart attack  INFERIOR WALL MI    S/P CABG x 1      S/P TKR (total knee replacement), right  with infection Mrsa   per pt he was cleared from MRSA infection    Surgery, elective  right knee wound debridement    History of open reduction and internal fixation (ORIF) procedure  right hip    H/O shoulder surgery  right    AICD (automatic cardioverter/defibrillator) present  [facility] [place]    Cholecystostomy care  drain inserted  & removed 4 months ago    History of tonsillectomy    History of hip replacement, total, right    Elective surgery  plastic surgery Left shin      SOCIAL HISTORY:  Negative for smoking/alcohol/drug use.     ALLERGIES:  ACE inhibitors (Rash)  Entresto (Swelling)  Atorvastatin Calcium (Unknown)  Bactrim (Unknown)  Plavix (Unknown)    MEDICATIONS:  STANDING MEDICATIONS  aspirin enteric coated 81 milliGRAM(s) Oral daily  dextrose 5% + sodium chloride 0.9%. 1000 milliLiter(s) IV Continuous <Continuous>  dextrose 5%. 1000 milliLiter(s) IV Continuous <Continuous>  dextrose 5%. 1000 milliLiter(s) IV Continuous <Continuous>  dextrose 50% Injectable 25 Gram(s) IV Push once  dextrose 50% Injectable 12.5 Gram(s) IV Push once  dextrose 50% Injectable 25 Gram(s) IV Push once  diazepam    Tablet 1 milliGRAM(s) Oral at bedtime  DULoxetine 30 milliGRAM(s) Oral <User Schedule>  furosemide   Injectable 20 milliGRAM(s) IV Push daily  glucagon  Injectable 1 milliGRAM(s) IntraMuscular once  heparin   Injectable 5000 Unit(s) SubCutaneous every 12 hours  hydrocortisone 2.5% Cream 1 Application(s) Topical two times a day  insulin glargine Injectable (LANTUS) 12 Unit(s) SubCutaneous at bedtime  insulin lispro (ADMELOG) corrective regimen sliding scale   SubCutaneous three times a day before meals  lactulose Syrup 10 Gram(s) Oral at bedtime  levothyroxine 25 MICROGram(s) Oral <User Schedule>  levothyroxine 50 MICROGram(s) Oral <User Schedule>  metoprolol succinate ER 25 milliGRAM(s) Oral daily  midodrine. 10 milliGRAM(s) Oral three times a day  pantoprazole    Tablet 40 milliGRAM(s) Oral before breakfast  prasugrel 10 milliGRAM(s) Oral daily  sodium polystyrene sulfonate Suspension 15 Gram(s) Oral once    PRN MEDICATIONS  dextrose Oral Gel 15 Gram(s) Oral once PRN    VITALS:   T(F): 96.7, Max: 97 (23 @ 05:44)  HR: 80 (80 - 88)  BP: 107/51 (90/51 - 405/60)  RR: 18 (18 - 18)  SpO2: --    LABS:                        9.7    6.91  )-----------( 358      ( 21 May 2023 05:26 )             33.3         134<L>  |  95<L>  |  65<HH>  ----------------------------<  131<H>  4.1   |  27  |  2.2<H>    Ca    8.5      21 May 2023 05:26  Mg     2.0         TPro  6.3  /  Alb  3.2<L>  /  TBili  1.3<H>  /  DBili  x   /  AST  67<H>  /  ALT  63<H>  /  AlkPhos  134<H>      PT/INR - ( 20 May 2023 07:01 )   PT: 26.80 sec;   INR: 2.29 ratio         PTT - ( 20 May 2023 07:01 )  PTT:30.9 sec  Urinalysis Basic - ( 20 May 2023 11:00 )    Color: Yellow / Appearance: Slightly Turbid / S.028 / pH: x  Gluc: x / Ketone: Negative  / Bili: Negative / Urobili: 3 mg/dL   Blood: x / Protein: 300 mg/dL / Nitrite: Negative   Leuk Esterase: Large / RBC: 143 /HPF /  /HPF   Sq Epi: x / Non Sq Epi: x / Bacteria: Negative                    23 @ 07:01  -  23 @ 07:00  --------------------------------------------------------  IN: 210 mL / OUT: 1680 mL / NET: -1470 mL    23 @ 07:01  -  23 @ 14:58  --------------------------------------------------------  IN: 572 mL / OUT: 650 mL / NET: -78 mL          IMAGING/EKG:    PHYSICAL EXAM:  GEN: NAD, comfortable  LUNGS: CTAB, no w/r/r  HEART: RRR, s1 and s2 appreciated, no m/r/g  ABD: soft, NT/ND, +BS  EXT: no edema, PP b/l  NEURO: AAOX3

## 2023-05-22 NOTE — PROGRESS NOTE ADULT - SUBJECTIVE AND OBJECTIVE BOX
CHIEF COMPLAINT:    Patient is a 67y old  Male who presents with a chief complaint of Mechanical fall     INTERVAL HPI/OVERNIGHT EVENTS:    Patient seen and examined at bedside. No acute overnight events occurred.    ROS: Denies SOB, chest pain. All other systems are negative.    Medications:  Standing  aspirin enteric coated 81 milliGRAM(s) Oral daily  buMETAnide Injectable 2 milliGRAM(s) IV Push every 12 hours  dextrose 5% + sodium chloride 0.9%. 1000 milliLiter(s) IV Continuous <Continuous>  dextrose 5%. 1000 milliLiter(s) IV Continuous <Continuous>  dextrose 5%. 1000 milliLiter(s) IV Continuous <Continuous>  dextrose 50% Injectable 25 Gram(s) IV Push once  dextrose 50% Injectable 25 Gram(s) IV Push once  dextrose 50% Injectable 12.5 Gram(s) IV Push once  diazepam    Tablet 1 milliGRAM(s) Oral at bedtime  DULoxetine 30 milliGRAM(s) Oral <User Schedule>  glucagon  Injectable 1 milliGRAM(s) IntraMuscular once  heparin   Injectable 5000 Unit(s) SubCutaneous every 12 hours  hydrocortisone 2.5% Cream 1 Application(s) Topical two times a day  insulin glargine Injectable (LANTUS) 12 Unit(s) SubCutaneous at bedtime  insulin lispro (ADMELOG) corrective regimen sliding scale   SubCutaneous three times a day before meals  lactulose Syrup 10 Gram(s) Oral at bedtime  levothyroxine 25 MICROGram(s) Oral <User Schedule>  levothyroxine 50 MICROGram(s) Oral <User Schedule>  metoprolol succinate ER 25 milliGRAM(s) Oral daily  midodrine. 10 milliGRAM(s) Oral three times a day  oxyCODONE    IR 10 milliGRAM(s) Oral once  pantoprazole    Tablet 40 milliGRAM(s) Oral before breakfast  polyethylene glycol 3350 17 Gram(s) Oral two times a day  prasugrel 10 milliGRAM(s) Oral daily  senna 2 Tablet(s) Oral at bedtime  sodium chloride 3%. 150 milliLiter(s) IV Continuous <Continuous>    PRN Meds  dextrose Oral Gel 15 Gram(s) Oral once PRN        Vital Signs:    T(F): 96.1 (23 @ 13:45), Max: 96.2 (23 @ 19:07)  HR: 84 (23 @ 13:45) (78 - 87)  BP: 106/58 (23 @ 13:45) (99/57 - 106/67)  RR: 18 (23 @ 13:45) (18 - 18)  SpO2: 93% (23 @ 05:39) (93% - 93%)  I&O's Summary    21 May 2023 07:01  -  22 May 2023 07:00  --------------------------------------------------------  IN: 858 mL / OUT: 1200 mL / NET: -342 mL    22 May 2023 07:01  -  22 May 2023 15:55  --------------------------------------------------------  IN: 570 mL / OUT: 950 mL / NET: -380 mL      Daily     Daily Weight in k.6 (22 May 2023 05:39)  CAPILLARY BLOOD GLUCOSE      POCT Blood Glucose.: 208 mg/dL (22 May 2023 11:23)  POCT Blood Glucose.: 183 mg/dL (22 May 2023 07:31)  POCT Blood Glucose.: 165 mg/dL (21 May 2023 21:32)  POCT Blood Glucose.: 201 mg/dL (21 May 2023 16:30)      PHYSICAL EXAM:  Pt deferred, was too upset about clinical condition today      LABS:                        9.7    7.91  )-----------( 299      ( 22 May 2023 06:08 )             34.4         136  |  95<L>  |  55<H>  ----------------------------<  143<H>  4.1   |  29  |  2.0<H>    Ca    8.8      22 May 2023 06:08  Mg     2.0         TPro  6.2  /  Alb  3.1<L>  /  TBili  1.3<H>  /  DBili  x   /  AST  43<H>  /  ALT  54<H>  /  AlkPhos  131<H>  05-22    PT/INR - ( 22 May 2023 06:08 )   PT: 18.40 sec;   INR: 1.59 ratio         PTT - ( 22 May 2023 06:08 )  PTT:29.7 sec          RADIOLOGY & ADDITIONAL TESTS:  Imaging or report Personally Reviewed:  [ ] YES  [ ] NO -->no new images    Telemetry reviewed independently - NSR, no acute events  EKG reviewed independently -->no new EKGs    Consultant(s) Notes Reviewed:  [ ] YES  [ ] NO  Care Discussed with Consultants/Other Providers [ ] YES  [ ] NO    Case discussed with resident  Care discussed with pt

## 2023-05-22 NOTE — CONSULT NOTE ADULT - ASSESSMENT
67M with PMH of HRrEF (15-20% EF) s/p AICD, DM1, HLD, HTN CAD s/p MI s/p CABG, PAD s/p 3 LLE stents, TIAGO, psoariasis, and R AKA here after mechanical fall, without acute trauma noted. Course c/b acute-on-chronic HFrEF and CRS, with type 2 NSTEMI due to demand ischemia, on diuresis. Also with mild transaminitis, likely from congestion. Palliative care consulted for GOC. Patient is full code.

## 2023-05-22 NOTE — PROGRESS NOTE ADULT - ASSESSMENT
66 yo male, PMHx of HFrEF 15-20% s/p ICD, decreased RV function, mild MR/TR, DM type I, DL, HTN, CAD, hx MI, s/p CABG, s/p stent (2018), PAD s/p 3 LLE stents, TIAGO, Psorias, presents s/p fall  called for MOISES / stage 3b ckd  # patient hypotensive / started on midodrine BP improved/ last echo in 2/23 EF in the 20 % , grade 3 DD  # b/l pleural effusions / pulmonary evaluation ? tap   #u sodium < 20/ in favor of decrease effective renal blood flow / likely CRS   # creat improving, consider switching lasix to po [place] , patient on RA   # Check CK  #repeat K/L/ IF weak ig G kappa in the past   # d/c kayexalate   # CT : left renal pelvis dilatation with ? UPJ obstruction / sono noted needs  evaluation  # last OBED positive , repeat check DNA repeat [id number]   # no acute indication for RRT  will follow

## 2023-05-22 NOTE — PROGRESS NOTE ADULT - ASSESSMENT
67-year-old male w/ HFrEF 15-20% s/p ICD, decreased RV function, mild MR/TR, DM type I w/ neuropathy and hx hypoglycemia , DL, HTN, CAD, hx MI, s/p CABG, s/p stent (2018), hx in-stent thrombosis while on Plavix, PAD s/p 3 LLE stents, TIAGO (needs CPAP auto-regulating pressure 10-16, patient doesn't use it due to claustrophobia), Psoriasis, R-AKA presenting to ED s/p fall. He states he was lying in bed at home when he fell asleep and fell off the bed. Found to have moises and mild elevation of trop     1. Mechanical Fall   -No CT evidence for acute traumatic injury within the chest, abdomen or pelvis  -No evidence of acute intracranial pathology. Stable exam since 2023.  -No evidence of acute cervical spine fracture or subluxation.  -Vitals wnl   -PT eval:pending      2.Likely Acute systolic CHF complicated by MOISES (cardiorenal) and Troponinemia  * pt denies chest pain but report orthopnea  - Telemetry            - ECG :paced rythm     -CXR: congestion with BNP 6000   - Cont asa/prasugrel   - Cont metoprolol  - has allergy to ACE and Entresto   - c/w Midodrine 10mg TID  - Increase Lasix 20mg IV BID, pt is sensitive to lasix  - Loja for strict I&O   - Renal/bladder US:pending   - Trend trop   (detectable likely demand ischemia in the setting of chf)   - CT chest notable for Moderate to large bilateral pleural effusions, right greater than left. Small to moderate volume abdominopelvic ascites.  - Cardio consult appreaciated >> Lasix, HF consult  - Nephro consult appreaciated  - HF consult pending    3.  DM type 1 complicated by hypoglycemia  episodes of hypoglycemia at home  - Insulin protocol lantus and nutritional insulin was decreased.     4.Hypothyroid   -c/w  synthroid    5. Mild hyperkalemia (resolved)    6.Recent dx of RA   * pt was dx with RA due to upper ext arterial ulcer?   - Cont Methotrexate (last dose was 2023)     # DVT prophylaxis - lovenox   #R-LE non-weight bearing  #carb-consistent/dash diet  #full code (discussed GOC with pt on 05/22)    pendin) HF consult  2) Palliative consult

## 2023-05-22 NOTE — CONSULT NOTE ADULT - SUBJECTIVE AND OBJECTIVE BOX
HPI: 67M with PMH of HRrEF (15-20% EF) s/p AICD, DM1, HLD, HTN CAD s/p MI s/p CABG, PAD s/p 3 LLE stents, TIAGO, psoariasis, and R AKA here after mechanical fall, without acute trauma noted. Course c/b acute-on-chronic HFrEF and CRS, with type 2 NSTEMI due to demand ischemia, on diuresis. Also with mild transaminitis, likely from congestion. Palliative care consulted for GOC. Patient is full code.    PERTINENT PM/SXH:   Status post percutaneous transluminal coronary angioplasty    History of surgery to heart and great vessels, presenting hazards to health    Atherosclerosis of coronary artery    Type 2 diabetes mellitus with neurological manifestations    Type 2 diabetes mellitus    HTN (hypertension)    Osteoarthritis    Chronic systolic CHF (congestive heart failure)    HLD (hyperlipidemia)    Hyperkalemia    COPD, moderate    CKD (chronic kidney disease) stage 2, GFR 60-89 ml/min    Blood clot due to device, implant, or graft    Diabetes    HTN (hypertension)    CHF (congestive heart failure)    History of celiac disease    MRSA bacteremia    Diverticulitis    STEMI (ST elevation myocardial infarction)    Diabetic neuropathy    Celiac disease    Anxiety and depression    Diabetic foot ulcer    Preoperative clearance      Other postprocedural status    History of surgery to major organs, presenting hazards to health    Stented coronary artery    S/P CABG x 1    S/P TKR (total knee replacement), right    Surgery, elective    Surgery, elective    History of open reduction and internal fixation (ORIF) procedure    H/O shoulder surgery    AICD (automatic cardioverter/defibrillator) present    Cholecystostomy care    History of tonsillectomy    History of hip replacement, total, right    Elective surgery    Elective surgery      FAMILY HISTORY:  Family history of heart disease (Mother)    Family history of allergies  daughter      ITEMS NOT CHECKED ARE NOT PRESENT    SOCIAL HISTORY:   Significant other/partner[ ]  Children[ ]  B[employer]ist/Spirituality:  Substance hx:  [ ]   Tobacco hx:  [ ]   Alcohol hx: [ ]   Living Situation: [ ]Home  [ ]Long term care  [ ]Rehab [ ]Other  Home Services: [ ] HHA [ ] Angela RN [ ] Hospice  Occupation:  Home Opioid hx:  [ ] Y [ ] N [ ] I-Stop Reference No:    ADVANCE DIRECTIVES:    [ ] Full Code [ ] DNR  MOLST  [ ]  Living Will  [ ]   DECISION MAKER(s):  [ ] Health Care Proxy(s)  [ ] Surrogate(s)  [ ] Guardian           Name(s): Phone Number(s): juliann Sanchez [phone]    BASELINE (I)ADL(s) (prior to admission):  [place]: [ ]Total  [ ] Moderate [ ]Dependent  Palliative Performance Status Version 2:         %    http://[facility].org/files/news/palliative_performance_scale_ppsv2.pdf    Allergies    ACE inhibitors (Rash)  Entresto (Swelling)  Atorvastatin Calcium (Unknown)  Bactrim (Unknown)  Plavix (Unknown)    Intolerances    MEDICATIONS  (STANDING):  aspirin enteric coated 81 milliGRAM(s) Oral daily  dextrose 5% + sodium chloride 0.9%. 1000 milliLiter(s) (50 mL/Hr) IV Continuous <Continuous>  dextrose 5%. 1000 milliLiter(s) (100 mL/Hr) IV Continuous <Continuous>  dextrose 5%. 1000 milliLiter(s) (50 mL/Hr) IV Continuous <Continuous>  dextrose 50% Injectable 25 Gram(s) IV Push once  dextrose 50% Injectable 12.5 Gram(s) IV Push once  dextrose 50% Injectable 25 Gram(s) IV Push once  diazepam    Tablet 1 milliGRAM(s) Oral at bedtime  DULoxetine 30 milliGRAM(s) Oral <User Schedule>  furosemide   Injectable 20 milliGRAM(s) IV Push daily  glucagon  Injectable 1 milliGRAM(s) IntraMuscular once  heparin   Injectable 5000 Unit(s) SubCutaneous every 12 hours  hydrocortisone 2.5% Cream 1 Application(s) Topical two times a day  insulin glargine Injectable (LANTUS) 12 Unit(s) SubCutaneous at bedtime  insulin lispro (ADMELOG) corrective regimen sliding scale   SubCutaneous three times a day before meals  lactulose Syrup 10 Gram(s) Oral at bedtime  levothyroxine 25 MICROGram(s) Oral <User Schedule>  levothyroxine 50 MICROGram(s) Oral <User Schedule>  metoprolol succinate ER 25 milliGRAM(s) Oral daily  midodrine. 10 milliGRAM(s) Oral three times a day  pantoprazole    Tablet 40 milliGRAM(s) Oral before breakfast  polyethylene glycol 3350 17 Gram(s) Oral two times a day  prasugrel 10 milliGRAM(s) Oral daily  senna 2 Tablet(s) Oral at bedtime    MEDICATIONS  (PRN):  dextrose Oral Gel 15 Gram(s) Oral once PRN Blood Glucose LESS THAN 70 milliGRAM(s)/deciliter    PRESENT SYMPTOMS: [ ]Unable to obtain due to poor mentation   Source if other than patient:  [ ]Family   [ ]Team     Pain: [ ]yes [ ]no  QOL impact -   Location -                    Aggravating factors -  Quality -  Radiation -  Timing-  Severity (0-10 scale):  Minimal acceptable level (0-10 scale):     CPOT:    https://www.[facility].org/getattachment/[email or web address]/Critical-Care-Pain-Observation-Tool-(CPOT)    PAIN AD Score:   http://geriatrictoolkit.[email or web address]/cog/painad.pdf (press ctrl +  left click to view)    Dyspnea:                           [ ]None[ ]Mild [ ]Moderate [ ]Severe     Respiratory Distress Observation Scale (RDOS):   A score of 0 to 2 signifies little or no respiratory distress, 3 signifies mild distress, scores 4 to 6 indicate moderate distress, and scores greater than 7 signify severe distress  https://www.[facility].ca/sites/default/files/PDFS/977618-muktnzersvz-jluxdozq-djzlrzxpnyr-bkmlj.pdf    Anxiety:                             [ ]None[ ]Mild [ ]Moderate [ ]Severe   Fatigue:                             [ ]None[ ]Mild [ ]Moderate [ ]Severe   Nausea:                             [ ]None[ ]Mild [ ]Moderate [ ]Severe   Loss of appetite:              [ ]None[ ]Mild [ ]Moderate [ ]Severe   Constipation:                    [ ]None[ ]Mild [ ]Moderate [ ]Severe    Other Symptoms:  [ ]All other review of systems negative     Palliative Performance Status Version 2:         %    http://npcrc.org/files/news/palliative_performance_scale_ppsv2.pdf  PHYSICAL EXAM:  Vital Signs Last 24 Hrs  T(C): 35.7 (21 May 2023 19:07), Max: 35.9 (21 May 2023 13:29)  T(F): 96.2 (21 May 2023 19:07), Max: 96.7 (21 May 2023 13:29)  HR: 78 (22 May 2023 05:39) (78 - 88)  BP: 106/67 (22 May 2023 05:39) (99/57 - 405/60)  BP(mean): 83 (22 May 2023 05:39) (83 - 83)  RR: 18 (22 May 2023 05:39) (18 - 18)  SpO2: 93% (22 May 2023 05:39) (93% - 93%)    Parameters below as of 22 May 2023 05:39  Patient On (Oxygen Delivery Method): room air     I&O's Summary    21 May 2023 07:01  -  22 May 2023 07:00  --------------------------------------------------------  IN: 858 mL / OUT: 1200 mL / NET: -342 mL    22 May 2023 07:01  -  22 May 2023 10:23  --------------------------------------------------------  IN: 210 mL / OUT: 300 mL / NET: -90 mL        GENERAL:  [X ] No acute distress [ ]Lethargic  [ ]Unarousable  [ ]Verbal  [ ]Non-Verbal [ ]Cachexia    BEHAVIORAL/PSYCH:  [ ]Alert and Oriented x  [ ] Anxiety [ ] Delirium [ ] Agitation [X ] Calm   EYES: [ ] No scleral icterus [ ] Scleral icterus [ ] Closed  ENMT:  [ ]Dry mouth  [ ]No external oral lesions [ ] No external ear or nose lesions  CARDIOVASCULAR:  [ ]Regular [ ]Irregular [ ]Tachy [ ]Not Tachy  [ ][place] [ ] Edema [ ] No edema  PULMONARY:  [ ]Tachypnea  [ ]Audible excessive secretions [X ] No labored breathing [ ] labored breathing  GASTROINTESTINAL: [ ]Soft  [ ]Distended  [ X]Not distended [ ]Non tender [ ]Tender  MUSCULOSKELETAL: [ ]No clubbing [ ] clubbing  [ ] No cyanosis [ ] cyanosis  NEUROLOGIC: [ ]No focal deficits  [ ]Follows commands  [ ]Does not follow commands  [ ]Cognitive impairment  [ ]Dysphagia  [ ]Dysarthria  [ ]Paresis   SKIN: [ ] Jaundiced [X ] Non-jaundiced [ ]Rash [ ]No Rash [ ] Warm [ ] Dry  MISC/LINES: [ ] ET tube [ ] Trach [ ]NGT/OGT [ ]PEG [ ]Loja    CRITICAL CARE:  [ ] Shock Present  [ ]Septic [ ]Cardiogenic [ ]Neurologic [ ]Hypovolemic  [ ]  Vasopressors [ ]  Inotropes   [ ]Respiratory failure present [ ]Mechanical ventilation [ ]Non-invasive ventilatory support [ ]High flow  [ ]Acute  [ ]Chronic [ ]Hypoxic  [ ]Hypercarbic [ ]Other  [ ]Other organ failure     LABS: reviewed by me                        9.7    6.91  )-----------( 358      ( 21 May 2023 05:26 )             33.3       136  |  95<L>  |  55<H>  ----------------------------<  143<H>  4.1   |  29  |  2.0<H>    Ca    8.8      22 May 2023 06:08  Mg     2.0         TPro  6.2  /  Alb  3.1<L>  /  TBili  1.3<H>  /  DBili  x   /  AST  43<H>  /  ALT  54<H>  /  AlkPhos  131<H>    PT/INR - ( 22 May 2023 06:08 )   PT: 18.40 sec;   INR: 1.59 ratio         PTT - ( 22 May 2023 06:08 )  PTT:29.7 sec    Urinalysis Basic - ( 20 May 2023 11:00 )    Color: Yellow / Appearance: Slightly Turbid / S.028 / pH: x  Gluc: x / Ketone: Negative  / Bili: Negative / Urobili: 3 mg/dL   Blood: x / Protein: 300 mg/dL / Nitrite: Negative   Leuk Esterase: Large / RBC: 143 /HPF /  /HPF   Sq Epi: x / Non Sq Epi: x / Bacteria: Negative      RADIOLOGY & ADDITIONAL STUDIES: reviewed by me  CT C/A/P 23    No CT evidence for acute traumatic injury within the chest, abdomen or   pelvis.    Moderate to large bilateral pleural effusions, right greater than left.   Small to moderate volume abdominopelvic ascites.    Cardiomegaly with focal discontinuity anterior left atrial wall,   unchanged.    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    PROTEIN CALORIE MALNUTRITION PRESENT: [ ]mild [ ]moderate [ ]severe [ ]underweight [ ]morbid obesity  https://www.andeal.org/vault/2440/web/files/ONC/Table_Clinical%20Characteristics%20to%20Document%20Malnutrition-White%20JV%20et%20al%2020.pdf    Height (cm): 185.4 (23 @ 06:52), 185.4 (23 @ 13:27), 185.4 (23 @ 21:42)  Weight (kg): 80.2 (23 @ 22:03), 68 (23 @ 13:27), 66.7 (23 @ 14:50)  BMI (kg/m2): 23.3 (23 @ 22:03), 19.8 (23 @ 06:52), 19.8 (23 @ :27)    [ ]PPSV2 < or = to 30% [ ]significant weight loss  [ ]poor nutritional intake  [ ]anasarca      [ ]Artificial Nutrition      REFERRALS:   [ ]Chaplaincy  [ ]Hospice  [ ]Child Life  [ ]Social Work  [ ]Case management [ ]Holistic Therapy     Palliative care education provided to patient and/or family    Goals of Care Document:     ______________  Valente Mendoza MD  Palliative Medicine  [facility]   of Geriatric and Palliative Medicine  [phone]   HPI: 67M with PMH of HRrEF (15-20% EF) s/p AICD, DM1, HLD, HTN CAD s/p MI s/p CABG, PAD s/p 3 LLE stents, TIAGO, psoariasis, and R AKA here after mechanical fall, without acute trauma noted. Course c/b acute-on-chronic HFrEF and CRS, with type 2 NSTEMI due to demand ischemia, on diuresis. Also with mild transaminitis, likely from congestion. Palliative care consulted for GOC. Patient is full code.    PERTINENT PM/SXH:   Status post percutaneous transluminal coronary angioplasty    History of surgery to heart and great vessels, presenting hazards to health    Atherosclerosis of coronary artery    Type 2 diabetes mellitus with neurological manifestations    Type 2 diabetes mellitus    HTN (hypertension)    Osteoarthritis    Chronic systolic CHF (congestive heart failure)    HLD (hyperlipidemia)    Hyperkalemia    COPD, moderate    CKD (chronic kidney disease) stage 2, GFR 60-89 ml/min    Blood clot due to device, implant, or graft    Diabetes    HTN (hypertension)    CHF (congestive heart failure)    History of celiac disease    MRSA bacteremia    Diverticulitis    STEMI (ST elevation myocardial infarction)    Diabetic neuropathy    Celiac disease    Anxiety and depression    Diabetic foot ulcer    Preoperative clearance      Other postprocedural status    History of surgery to major organs, presenting hazards to health    Stented coronary artery    S/P CABG x 1    S/P TKR (total knee replacement), right    Surgery, elective    Surgery, elective    History of open reduction and internal fixation (ORIF) procedure    H/O shoulder surgery    AICD (automatic cardioverter/defibrillator) present    Cholecystostomy care    History of tonsillectomy    History of hip replacement, total, right    Elective surgery    Elective surgery      FAMILY HISTORY:  Family history of heart disease (Mother)    Family history of allergies  daughter      ITEMS NOT CHECKED ARE NOT PRESENT    SOCIAL HISTORY:   Significant other/partner[ ]  Children[ ]  Adventism/Spirituality:  Substance hx:  [ ]   Tobacco hx:  [ ]   Alcohol hx: [ ]   Living Situation: [ ]Home  [ ]Long term care  [ ]Rehab [ ]Other  Home Services: [ ] HHA [ ] Angela RN [ ] Hospice  Occupation:  Home Opioid hx:  [ ] Y [ ] N [ ] I-Stop Reference No:    ADVANCE DIRECTIVES:    [ ] Full Code [ ] DNR  MOLST  [ ]  Living Will  [ ]   DECISION MAKER(s):  [X ] Health Care Proxy(s) in onecontent [ ] Surrogate(s)  [ ] Guardian           Name(s): Phone Number(s): wife Raine  daughter Laura [phone]    BASELINE (I)ADL(s) (prior to admission):  [place]: [ ]Total  [ ] Moderate [ ]Dependent  Palliative Performance Status Version 2:         %    http://[email or web address]/files/news/palliative_performance_scale_[email or web address].pdf    Allergies    ACE inhibitors (Rash)  Entresto (Swelling)  Atorvastatin Calcium (Unknown)  Bactrim (Unknown)  Plavix (Unknown)    Intolerances    MEDICATIONS  (STANDING):  aspirin enteric coated 81 milliGRAM(s) Oral daily  dextrose 5% + sodium chloride 0.9%. 1000 milliLiter(s) (50 mL/Hr) IV Continuous <Continuous>  dextrose 5%. 1000 milliLiter(s) (100 mL/Hr) IV Continuous <Continuous>  dextrose 5%. 1000 milliLiter(s) (50 mL/Hr) IV Continuous <Continuous>  dextrose 50% Injectable 25 Gram(s) IV Push once  dextrose 50% Injectable 12.5 Gram(s) IV Push once  dextrose 50% Injectable 25 Gram(s) IV Push once  diazepam    Tablet 1 milliGRAM(s) Oral at bedtime  DULoxetine 30 milliGRAM(s) Oral <User Schedule>  furosemide   Injectable 20 milliGRAM(s) IV Push daily  glucagon  Injectable 1 milliGRAM(s) IntraMuscular once  heparin   Injectable 5000 Unit(s) SubCutaneous every 12 hours  hydrocortisone 2.5% Cream 1 Application(s) Topical two times a day  insulin glargine Injectable (LANTUS) 12 Unit(s) SubCutaneous at bedtime  insulin lispro (ADMELOG) corrective regimen sliding scale   SubCutaneous three times a day before meals  lactulose Syrup 10 Gram(s) Oral at bedtime  levothyroxine 25 MICROGram(s) Oral <User Schedule>  levothyroxine 50 MICROGram(s) Oral <User Schedule>  metoprolol succinate ER 25 milliGRAM(s) Oral daily  midodrine. 10 milliGRAM(s) Oral three times a day  pantoprazole    Tablet 40 milliGRAM(s) Oral before breakfast  polyethylene glycol 3350 17 Gram(s) Oral two times a day  prasugrel 10 milliGRAM(s) Oral daily  senna 2 Tablet(s) Oral at bedtime    MEDICATIONS  (PRN):  dextrose Oral Gel 15 Gram(s) Oral once PRN Blood Glucose LESS THAN 70 milliGRAM(s)/deciliter    PRESENT SYMPTOMS: [ ]Unable to obtain due to poor mentation   Source if other than patient:  [ ]Family   [ ]Team     Pain: [ ]yes [ ]no  QOL impact -   Location -                    Aggravating factors -  Quality -  Radiation -  Timing-  Severity (0-10 scale):  Minimal acceptable level (0-10 scale):     CPOT:    https://www.[facility].org/getattachment/[email or web address]/Critical-Care-Pain-Observation-Tool-(CPOT)    PAIN AD Score:   [email or web address]://geriatrictoolkit.[email or web address]/cog/painad.pdf (press ctrl +  left click to view)    Dyspnea:                           [ ]None[ ]Mild [ ]Moderate [ ]Severe     Respiratory Distress Observation Scale (RDOS):   A score of 0 to 2 signifies little or no respiratory distress, 3 signifies mild distress, scores 4 to 6 indicate moderate distress, and scores greater than 7 signify severe distress  https://www.[facility].ca/sites/default/files/PDFS/021604-lrfawxwaftn-ybgrdfsm-whuventqrdu-edfkw.pdf    Anxiety:                             [ ]None[ ]Mild [ ]Moderate [ ]Severe   Fatigue:                             [ ]None[ ]Mild [ ]Moderate [ ]Severe   Nausea:                             [ ]None[ ]Mild [ ]Moderate [ ]Severe   Loss of appetite:              [ ]None[ ]Mild [ ]Moderate [ ]Severe   Constipation:                    [ ]None[ ]Mild [ ]Moderate [ ]Severe    Other Symptoms:  [ ]All other review of systems negative     Palliative Performance Status Version 2:         %    http://npcrc.org/files/news/palliative_performance_scale_ppsv2.pdf  PHYSICAL EXAM:  Vital Signs Last 24 Hrs  T(C): 35.7 (21 May 2023 19:07), Max: 35.9 (21 May 2023 13:29)  T(F): 96.2 (21 May 2023 19:07), Max: 96.7 (21 May 2023 13:29)  HR: 78 (22 May 2023 05:39) (78 - 88)  BP: 106/67 (22 May 2023 05:39) (99/57 - 405/60)  BP(mean): 83 (22 May 2023 05:39) (83 - 83)  RR: 18 (22 May 2023 05:39) (18 - 18)  SpO2: 93% (22 May 2023 05:39) (93% - 93%)    Parameters below as of 22 May 2023 05:39  Patient On (Oxygen Delivery Method): room air     I&O's Summary    21 May 2023 07:01  -  22 May 2023 07:00  --------------------------------------------------------  IN: 858 mL / OUT: 1200 mL / NET: -342 mL    22 May 2023 07:01  -  22 May 2023 10:23  --------------------------------------------------------  IN: 210 mL / OUT: 300 mL / NET: -90 mL        GENERAL:  [X ] No acute distress [ ]Lethargic  [ ]Unarousable  [ ]Verbal  [ ]Non-Verbal [ ]Cachexia    BEHAVIORAL/PSYCH:  [ ]Alert and Oriented x  [ ] Anxiety [ ] Delirium [ ] Agitation [X ] Calm   EYES: [ ] No scleral icterus [ ] Scleral icterus [ ] Closed  ENMT:  [ ]Dry mouth  [ ]No external oral lesions [ ] No external ear or nose lesions  CARDIOVASCULAR:  [ ]Regular [ ]Irregular [ ]Tachy [ ]Not Tachy  [ ][place] [ ] Edema [ ] No edema  PULMONARY:  [ ]Tachypnea  [ ]Audible excessive secretions [X ] No labored breathing [ ] labored breathing  GASTROINTESTINAL: [ ]Soft  [ ]Distended  [ X]Not distended [ ]Non tender [ ]Tender  MUSCULOSKELETAL: [ ]No clubbing [ ] clubbing  [ ] No cyanosis [ ] cyanosis  NEUROLOGIC: [ ]No focal deficits  [ ]Follows commands  [ ]Does not follow commands  [ ]Cognitive impairment  [ ]Dysphagia  [ ]Dysarthria  [ ]Paresis   SKIN: [ ] Jaundiced [X ] Non-jaundiced [ ]Rash [ ]No Rash [ ] Warm [ ] Dry  MISC/LINES: [ ] ET tube [ ] Trach [ ]NGT/OGT [ ]PEG [ ]Loja    CRITICAL CARE:  [ ] Shock Present  [ ]Septic [ ]Cardiogenic [ ]Neurologic [ ]Hypovolemic  [ ]  Vasopressors [ ]  Inotropes   [ ]Respiratory failure present [ ]Mechanical ventilation [ ]Non-invasive ventilatory support [ ]High flow  [ ]Acute  [ ]Chronic [ ]Hypoxic  [ ]Hypercarbic [ ]Other  [ ]Other organ failure     LABS: reviewed by me                        9.7    6.91  )-----------( 358      ( 21 May 2023 05:26 )             33.3   05-    136  |  95<L>  |  55<H>  ----------------------------<  143<H>  4.1   |  29  |  2.0<H>    Ca    8.8      22 May 2023 06:08  Mg     2.0         TPro  6.2  /  Alb  3.1<L>  /  TBili  1.3<H>  /  DBili  x   /  AST  43<H>  /  ALT  54<H>  /  AlkPhos  131<H>    PT/INR - ( 22 May 2023 06:08 )   PT: 18.40 sec;   INR: 1.59 ratio         PTT - ( 22 May 2023 06:08 )  PTT:29.7 sec    Urinalysis Basic - ( 20 May 2023 11:00 )    Color: Yellow / Appearance: Slightly Turbid / S.028 / pH: x  Gluc: x / Ketone: Negative  / Bili: Negative / Urobili: 3 mg/dL   Blood: x / Protein: 300 mg/dL / Nitrite: Negative   Leuk Esterase: Large / RBC: 143 /HPF /  /HPF   Sq Epi: x / Non Sq Epi: x / Bacteria: Negative      RADIOLOGY & ADDITIONAL STUDIES: reviewed by me  CT C/A/P 23    No CT evidence for acute traumatic injury within the chest, abdomen or   pelvis.    Moderate to large bilateral pleural effusions, right greater than left.   Small to moderate volume abdominopelvic ascites.    Cardiomegaly with focal discontinuity anterior left atrial wall,   unchanged.    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    PROTEIN CALORIE MALNUTRITION PRESENT: [ ]mild [ ]moderate [ ]severe [ ]underweight [ ]morbid obesity  https://www.andeal.org/vault/2440/web/files/ONC/Table_Clinical%20Characteristics%20to%20Document%20Malnutrition-White%20JV%20et%20al%2020.pdf    Height (cm): 185.4 (23 @ 06:52), 185.4 (23 @ 13:27), 185.4 (23 @ 21:42)  Weight (kg): 80.2 (23 @ 22:03), 68 (23 @ 13:27), 66.7 (23 @ 14:50)  BMI (kg/m2): 23.3 (23 @ 22:03), 19.8 (23 @ 06:52), 19.8 (23 @ 13:27)    [ ]PPSV2 < or = to 30% [ ]significant weight loss  [ ]poor nutritional intake  [ ]anasarca      [ ]Artificial Nutrition      REFERRALS:   [ ]Chaplaincy  [ ]Hospice  [ ]Child Life  [ ]Social Work  [ ]Case management [ ]Holistic Therapy     Palliative care education provided to patient and/or family    Goals of Care Document:     ______________  Valente Mendoza MD  Palliative Medicine  [facility]   of Geriatric and Palliative Medicine  [phone]   HPI: 67M with PMH of HRrEF (15-20% EF) s/p AICD, DM1, HLD, HTN CAD s/p MI s/p CABG, PAD s/p 3 LLE stents, TIAGO, psoariasis, and R AKA here after mechanical fall, without acute trauma noted. Course c/b acute-on-chronic HFrEF and CRS, with type 2 NSTEMI due to demand ischemia, on diuresis. Also with mild transaminitis, likely from congestion. Palliative care consulted for GOC. Patient is full code.    PERTINENT PM/SXH:   Status post percutaneous transluminal coronary angioplasty    History of surgery to heart and great vessels, presenting hazards to health    Atherosclerosis of coronary artery    Type 2 diabetes mellitus with neurological manifestations    Type 2 diabetes mellitus    HTN (hypertension)    Osteoarthritis    Chronic systolic CHF (congestive heart failure)    HLD (hyperlipidemia)    Hyperkalemia    COPD, moderate    CKD (chronic kidney disease) stage 2, GFR 60-89 ml/min    Blood clot due to device, implant, or graft    Diabetes    HTN (hypertension)    CHF (congestive heart failure)    History of celiac disease    MRSA bacteremia    Diverticulitis    STEMI (ST elevation myocardial infarction)    Diabetic neuropathy    Celiac disease    Anxiety and depression    Diabetic foot ulcer    Preoperative clearance      Other postprocedural status    History of surgery to major organs, presenting hazards to health    Stented coronary artery    S/P CABG x 1    S/P TKR (total knee replacement), right    Surgery, elective    Surgery, elective    History of open reduction and internal fixation (ORIF) procedure    H/O shoulder surgery    AICD (automatic cardioverter/defibrillator) present    Cholecystostomy care    History of tonsillectomy    History of hip replacement, total, right    Elective surgery    Elective surgery      FAMILY HISTORY:  Family history of heart disease (Mother)    Family history of allergies  daughter      ITEMS NOT CHECKED ARE NOT PRESENT    SOCIAL HISTORY:   Significant other/partner[ X]  Children[ X]  Alevism/Spirituality:  Substance hx:  [ ]   Tobacco hx:  [ ]   Alcohol hx: [ ]   Living Situation: [X ]Home  [ ]Long term care  [ ]Rehab [ ]Other  Home Services: [ ] HHA [ ] Visting RN [ ] Hospice  Occupation:  Home Opioid hx:  [ ] Y [ ] N [ ] I-Stop Reference No:    ADVANCE DIRECTIVES:    [ ] Full Code [ ] DNR  MOLST  [ ]  Living Will  [ ]   DECISION MAKER(s):  [X ] Health Care Proxy(s) in onecontent [ ] Surrogate(s)  [ ] Guardian           Name(s): Phone Number(s): wife Raine  daughter Laura [phone]    BASELINE (I)ADL(s) (prior to admission):  [place]: [ ]Total  [ ] Moderate [ ]Dependent  Palliative Performance Status Version 2:         %    http://[email or web address].org/files/news/palliative_performance_scale_[email or web address].pdf    Allergies    ACE inhibitors (Rash)  Entresto (Swelling)  Atorvastatin Calcium (Unknown)  Bactrim (Unknown)  Plavix (Unknown)    Intolerances    MEDICATIONS  (STANDING):  aspirin enteric coated 81 milliGRAM(s) Oral daily  dextrose 5% + sodium chloride 0.9%. 1000 milliLiter(s) (50 mL/Hr) IV Continuous <Continuous>  dextrose 5%. 1000 milliLiter(s) (100 mL/Hr) IV Continuous <Continuous>  dextrose 5%. 1000 milliLiter(s) (50 mL/Hr) IV Continuous <Continuous>  dextrose 50% Injectable 25 Gram(s) IV Push once  dextrose 50% Injectable 12.5 Gram(s) IV Push once  dextrose 50% Injectable 25 Gram(s) IV Push once  diazepam    Tablet 1 milliGRAM(s) Oral at bedtime  DULoxetine 30 milliGRAM(s) Oral <User Schedule>  furosemide   Injectable 20 milliGRAM(s) IV Push daily  glucagon  Injectable 1 milliGRAM(s) IntraMuscular once  heparin   Injectable 5000 Unit(s) SubCutaneous every 12 hours  hydrocortisone 2.5% Cream 1 Application(s) Topical two times a day  insulin glargine Injectable (LANTUS) 12 Unit(s) SubCutaneous at bedtime  insulin lispro (ADMELOG) corrective regimen sliding scale   SubCutaneous three times a day before meals  lactulose Syrup 10 Gram(s) Oral at bedtime  levothyroxine 25 MICROGram(s) Oral <User Schedule>  levothyroxine 50 MICROGram(s) Oral <User Schedule>  metoprolol succinate ER 25 milliGRAM(s) Oral daily  midodrine. 10 milliGRAM(s) Oral three times a day  pantoprazole    Tablet 40 milliGRAM(s) Oral before breakfast  polyethylene glycol 3350 17 Gram(s) Oral two times a day  prasugrel 10 milliGRAM(s) Oral daily  senna 2 Tablet(s) Oral at bedtime    MEDICATIONS  (PRN):  dextrose Oral Gel 15 Gram(s) Oral once PRN Blood Glucose LESS THAN 70 milliGRAM(s)/deciliter    PRESENT SYMPTOMS: [ ]Unable to obtain due to poor mentation   Source if other than patient:  [ ]Family   [ ]Team     Pain: [ ]yes [ X]no  QOL impact -   Location -                    Aggravating factors -  Quality -  Radiation -  Timing-  Severity (0-10 scale):  Minimal acceptable level (0-10 scale):     CPOT:    https://[email or web address]/getattachment/[email or web address]/Critical-Care-Pain-Observation-Tool-(CPOT)    PAIN AD Score:   [email or web address]/painad.pdf (press ctrl +  left click to view)    Dyspnea:                           [ ]None[ ]Mild [ ]Moderate [ ]Severe     Respiratory Distress Observation Scale (RDOS):   A score of 0 to 2 signifies little or no respiratory distress, 3 signifies mild distress, scores 4 to 6 indicate moderate distress, and scores greater than 7 signify severe distress  [email or web address]/sites/default/files/PDFS/857752-ldokyaurijf-aemgrzur-eprvxxiocdt-qshjo.pdf    Anxiety:                             [ ]None[ ]Mild [ ]Moderate [ ]Severe   Fatigue:                             [ ]None[ ]Mild [ ]Moderate [ ]Severe   Nausea:                             [ ]None[ ]Mild [ ]Moderate [ ]Severe   Loss of appetite:              [ ]None[ ]Mild [ ]Moderate [ ]Severe   Constipation:                    [ ]None[ ]Mild [ ]Moderate [ ]Severe    Other Symptoms:  [X ]All other review of systems negative     Palliative Performance Status Version 2:         %    http://npcrc.org/files/news/palliative_performance_scale_ppsv2.pdf  PHYSICAL EXAM:  Vital Signs Last 24 Hrs  T(C): 35.7 (21 May 2023 19:07), Max: 35.9 (21 May 2023 13:29)  T(F): 96.2 (21 May 2023 19:07), Max: 96.7 (21 May 2023 13:29)  HR: 78 (22 May 2023 05:39) (78 - 88)  BP: 106/67 (22 May 2023 05:39) (99/57 - 405/60)  BP(mean): 83 (22 May 2023 05:39) (83 - 83)  RR: 18 (22 May 2023 05:39) (18 - 18)  SpO2: 93% (22 May 2023 05:39) (93% - 93%)    Parameters below as of 22 May 2023 05:39  Patient On (Oxygen Delivery Method): room air     I&O's Summary    21 May 2023 07:01  -  22 May 2023 07:00  --------------------------------------------------------  IN: 858 mL / OUT: 1200 mL / NET: -342 mL    22 May 2023 07:01  -  22 May 2023 10:23  --------------------------------------------------------  IN: 210 mL / OUT: 300 mL / NET: -90 mL        GENERAL:  [X ] No acute distress [ ]Lethargic  [ ]Unarousable  [ ]Verbal  [ ]Non-Verbal [ ]Cachexia    BEHAVIORAL/PSYCH:  [ ]Alert and Oriented x  [ ] Anxiety [ ] Delirium [ ] Agitation [X ] Calm   EYES: [X ] No scleral icterus [ ] Scleral icterus [ ] Closed  ENMT:  [ ]Dry mouth  [ ]No external oral lesions [X ] No external ear or nose lesions  CARDIOVASCULAR:  [ ]Regular [ ]Irregular [ ]Tachy [ ]Not Tachy  [ ][place] [ ] Edema [ ] No edema  PULMONARY:  [ ]Tachypnea  [ ]Audible excessive secretions [X ] No labored breathing [ ] labored breathing  GASTROINTESTINAL: [ ]Soft  [ ]Distended  [ X]Not distended [ ]Non tender [ ]Tender  MUSCULOSKELETAL: [ ]No clubbing [ ] clubbing  [X ] No cyanosis [ ] cyanosis  NEUROLOGIC: [X ]No focal deficits  [ ]Follows commands  [ ]Does not follow commands  [ ]Cognitive impairment  [ ]Dysphagia  [ ]Dysarthria  [ ]Paresis   SKIN: [ ] Jaundiced [X ] Non-jaundiced [ ]Rash [ ]No Rash [ ] Warm [ ] Dry  MISC/LINES: [ ] ET tube [ ] Trach [ ]NGT/OGT [ ]PEG [ ]Loja    CRITICAL CARE:  [ ] Shock Present  [ ]Septic [ ]Cardiogenic [ ]Neurologic [ ]Hypovolemic  [ ]  Vasopressors [ ]  Inotropes   [ ]Respiratory failure present [ ]Mechanical ventilation [ ]Non-invasive ventilatory support [ ]High flow  [ ]Acute  [ ]Chronic [ ]Hypoxic  [ ]Hypercarbic [ ]Other  [ ]Other organ failure     LABS: reviewed by me                        9.7    6.91  )-----------( 358      ( 21 May 2023 05:26 )             33.3   05-    136  |  95<L>  |  55<H>  ----------------------------<  143<H>  4.1   |  29  |  2.0<H>    Ca    8.8      22 May 2023 06:08  Mg     2.0         TPro  6.2  /  Alb  3.1<L>  /  TBili  1.3<H>  /  DBili  x   /  AST  43<H>  /  ALT  54<H>  /  AlkPhos  131<H>  0522  PT/INR - ( 22 May 2023 06:08 )   PT: 18.40 sec;   INR: 1.59 ratio         PTT - ( 22 May 2023 06:08 )  PTT:29.7 sec    Urinalysis Basic - ( 20 May 2023 11:00 )    Color: Yellow / Appearance: Slightly Turbid / S.028 / pH: x  Gluc: x / Ketone: Negative  / Bili: Negative / Urobili: 3 mg/dL   Blood: x / Protein: 300 mg/dL / Nitrite: Negative   Leuk Esterase: Large / RBC: 143 /HPF /  /HPF   Sq Epi: x / Non Sq Epi: x / Bacteria: Negative      RADIOLOGY & ADDITIONAL STUDIES: reviewed by me  CT C/A/P 23    No CT evidence for acute traumatic injury within the chest, abdomen or   pelvis.    Moderate to large bilateral pleural effusions, right greater than left.   Small to moderate volume abdominopelvic ascites.    Cardiomegaly with focal discontinuity anterior left atrial wall,   unchanged.    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    PROTEIN CALORIE MALNUTRITION PRESENT: [ ]mild [ ]moderate [ ]severe [ ]underweight [ ]morbid obesity  https://www.andeal.org/vault/2440/web/files/ONC/Table_Clinical%20Characteristics%20to%20Document%20Malnutrition-White%20JV%20et%20al%2020.pdf    Height (cm): 185.4 (23 @ 06:52), 185.4 (23 @ 13:27), 185.4 (23 @ 21:42)  Weight (kg): 80.2 (23 @ 22:03), 68 (23 @ 13:27), 66.7 (23 @ 14:50)  BMI (kg/m2): 23.3 (23 @ 22:03), 19.8 (23 @ 06:52), 19.8 (23 @ 13:27)    [ ]PPSV2 < or = to 30% [ ]significant weight loss  [ ]poor nutritional intake  [ ]anasarca      [ ]Artificial Nutrition      REFERRALS:   [ ]Chaplaincy  [ ]Hospice  [ ]Child Life  [ ]Social Work  [ ]Case management [ ]Holistic Therapy     Palliative care education provided to patient and/or family    Goals of Care Document:     ______________  Valente Mendoza MD  Palliative Medicine  [facility]   of Geriatric and Palliative Medicine  [phone]

## 2023-05-22 NOTE — CONSULT NOTE ADULT - ASSESSMENT
Assessment: 67-year-old male w/ HFrEF 15-20% s/p ICD, DM type I, DL, HTN, CAD, hx MI, s/p CABG, s/p stent (2018), hx in-stent thrombosis while on Plavix, PAD s/p 3 LLE stents, TIAGO non-compliant w/ CPAP, R-AKA presenting to ED s/p fall (mechanical). Patient found to be fluid overloaded with MOISES, heart failure consulted for further management of ADHF.      Plan:  Patient is fluid overloaded on exam 2/2 acute decompensated heart failure  POCUS exam: IVC dilated, some collapsibility with respirations  Stop IV Lasix  Switch to Bumex 2mg IVP BID  Start 3% Hypertonic Saline 150ml BID (If infused throughout a central line, run over one hour, if a peripheral line is to be used run over 3 hours) *Start infusion, after 30 minutes- give Bumex IVP then continue rest of infusion*  Get BMP twice daily with magnesium   Maintain potassium >4.0, Mg >2.2  Wean midodrine as tolerated   Strict intake and output  Daily weight   Physical therapy consult  Obtain Iron profile  Plan discussed with patient, spouse present at bedside, and primary team  Will continue to follow

## 2023-05-22 NOTE — PROGRESS NOTE ADULT - SUBJECTIVE AND OBJECTIVE BOX
SUBJ: Patient seen and examined. No events overnight. Reports SOB improved.      MEDICATIONS  (STANDING):  aspirin enteric coated 81 milliGRAM(s) Oral daily  dextrose 5% + sodium chloride 0.9%. 1000 milliLiter(s) (50 mL/Hr) IV Continuous <Continuous>  dextrose 5%. 1000 milliLiter(s) (50 mL/Hr) IV Continuous <Continuous>  dextrose 5%. 1000 milliLiter(s) (100 mL/Hr) IV Continuous <Continuous>  dextrose 50% Injectable 25 Gram(s) IV Push once  dextrose 50% Injectable 12.5 Gram(s) IV Push once  dextrose 50% Injectable 25 Gram(s) IV Push once  diazepam    Tablet 1 milliGRAM(s) Oral at bedtime  DULoxetine 30 milliGRAM(s) Oral <User Schedule>  furosemide   Injectable 20 milliGRAM(s) IV Push two times a day  glucagon  Injectable 1 milliGRAM(s) IntraMuscular once  heparin   Injectable 5000 Unit(s) SubCutaneous every 12 hours  hydrocortisone 2.5% Cream 1 Application(s) Topical two times a day  insulin glargine Injectable (LANTUS) 12 Unit(s) SubCutaneous at bedtime  insulin lispro (ADMELOG) corrective regimen sliding scale   SubCutaneous three times a day before meals  lactulose Syrup 10 Gram(s) Oral at bedtime  levothyroxine 25 MICROGram(s) Oral <User Schedule>  levothyroxine 50 MICROGram(s) Oral <User Schedule>  metoprolol succinate ER 25 milliGRAM(s) Oral daily  midodrine. 10 milliGRAM(s) Oral three times a day  pantoprazole    Tablet 40 milliGRAM(s) Oral before breakfast  polyethylene glycol 3350 17 Gram(s) Oral two times a day  prasugrel 10 milliGRAM(s) Oral daily  senna 2 Tablet(s) Oral at bedtime    MEDICATIONS  (PRN):  dextrose Oral Gel 15 Gram(s) Oral once PRN Blood Glucose LESS THAN 70 milliGRAM(s)/deciliter            Vital Signs Last 24 Hrs  T(C): 35.7 (21 May 2023 19:07), Max: 35.9 (21 May 2023 13:29)  T(F): 96.2 (21 May 2023 19:07), Max: 96.7 (21 May 2023 13:29)  HR: 78 (22 May 2023 05:39) (78 - 87)  BP: 106/67 (22 May 2023 05:39) (99/57 - 107/51)  BP(mean): 83 (22 May 2023 05:39) (83 - 83)  RR: 18 (22 May 2023 05:39) (18 - 18)  SpO2: 93% (22 May 2023 05:39) (93% - 93%)    Parameters below as of 22 May 2023 05:39  Patient On (Oxygen Delivery Method): room air         REVIEW OF SYSTEMS:  CONSTITUTIONAL: No fever  NECK: No pain or stiffness  CARDIOVASCULAR: patient denies chest pain, shortness of breath improved .  Respiratory: No cough or wheezing.  NEUROLOGICAL: No focal deficits to report.  GI: no BRBPR, no N,V,diarrhea.    PSYCHIATRY: normal mood and affect  HEENT: no nasal discharge, no ecchymosis  SKIN: no ecchymosis, no breakdown  MUSCULOSKELETAL: Full range of motion x4.      PHYSICAL EXAM:  GENERAL: NAD  HEAD:  Atraumatic, Normocephalic  NECK: Supple, No JVD  NERVOUS SYSTEM:  Alert & Oriented X3, Good concentration  CHEST/LUNG: bibasilar decreased air entry   HEART: Regular rate and rhythm  ABDOMEN: Soft, Nontender  EXTREMITIES:  trace  edema  Psych: Appropriate mood and affect     	  TELEMETRY:      LABS:                        9.7    7.91  )-----------( 299      ( 22 May 2023 06:08 )             34.4     05-22    136  |  95<L>  |  55<H>  ----------------------------<  143<H>  4.1   |  29  |  2.0<H>    Ca    8.8      22 May 2023 06:08  Mg     2.0     05-22    TPro  6.2  /  Alb  3.1<L>  /  TBili  1.3<H>  /  DBili  x   /  AST  43<H>  /  ALT  54<H>  /  AlkPhos  131<H>  05-22        PT/INR - ( 22 May 2023 06:08 )   PT: 18.40 sec;   INR: 1.59 ratio         PTT - ( 22 May 2023 06:08 )  PTT:29.7 sec    I&O's Summary    21 May 2023 07:01  -  22 May 2023 07:00  --------------------------------------------------------  IN: 858 mL / OUT: 1200 mL / NET: -342 mL    22 May 2023 07:01  -  22 May 2023 12:57  --------------------------------------------------------  IN: 210 mL / OUT: 300 mL / NET: -90 mL      BNP  RADIOLOGY & ADDITIONAL STUDIES:    IMPRESSION AND PLAN:  HFrEF  Ischemic cardiomyopathy   Type II MI   MOISES  Ascites  Pleural effusion    - Management as per primary team  - Appreciate renal input  - I/O and continue diureses  - Continue Midodrine   - Will follow as needed

## 2023-05-22 NOTE — CONSULT NOTE ADULT - PROBLEM SELECTOR RECOMMENDATION 9
Full consult to follow shortly. Please call [phone] with any acute concerns. - c/w bumex IV, high risk, monitor hemodynamics, I/Os

## 2023-05-22 NOTE — DIETITIAN INITIAL EVALUATION ADULT - NSPROEDAREADYLEARN_GEN_A_NUR
Reviewed current diet order. Discussed DM & Heart Healthy nutrition therapy concepts. Written material of aforementioned education (via Nutrition Care Manual).

## 2023-05-22 NOTE — CONSULT NOTE ADULT - NS ATTEND AMEND GEN_ALL_CORE FT
66 y/o M with h/o ICM, CAD s/p CABG, PCI, PAD s/p AKA admitted after mechanical fall. He was found to be grossly overloaded and renal function has been worsening in the past couple of days. Today, renal function is slightly better. Patient is grossly overloaded on exam.       Start Bumex 2 mg ivp BID + hypertonic saline   Monitor strict I/Os and lactate   Continue low dose BB   Wean midodrine - aim for SBP >85 mmHg   Get iron profile

## 2023-05-22 NOTE — CONSULT NOTE ADULT - PROBLEM SELECTOR RECOMMENDATION 4
- introduced role of palliative care to patient and wife  - they state having discussed code status earlier today, as a former paramedic patient understands CPR, and taught ACLS  - full code for now

## 2023-05-22 NOTE — DIETITIAN INITIAL EVALUATION ADULT - LAB (SPECIFY)
Nutrition Goals: Pt to maintain tolerance & adherence to diet order, with at least 75% po intake maintained over next 7-10 days. Pt with adequate po intake at this time; previously with noted hypoglyemia prior to admit. 5/20 labs noted glucose of 63 however this appears to have since been resolved. Will continue to monitor glucose trends; recommend change diet to include evening snack to help avoid further instances of hypoglycemia (adjust insulin regimen to account for this).

## 2023-05-22 NOTE — PROGRESS NOTE ADULT - SUBJECTIVE AND OBJECTIVE BOX
Nephrology progress note    THIS IS AN INCOMPLETE NOTE . FULL NOTE TO FOLLOW SHORTLY    Patient is seen and examined, events over the last 24 h noted .    Allergies:  ACE inhibitors (Rash)  Entresto (Swelling)  Atorvastatin Calcium (Unknown)  Bactrim (Unknown)  Plavix (Unknown)    Hospital Medications:   MEDICATIONS  (STANDING):  aspirin enteric coated 81 milliGRAM(s) Oral daily  dextrose 5% + sodium chloride 0.9%. 1000 milliLiter(s) (50 mL/Hr) IV Continuous <Continuous>  dextrose 5%. 1000 milliLiter(s) (50 mL/Hr) IV Continuous <Continuous>  dextrose 5%. 1000 milliLiter(s) (100 mL/Hr) IV Continuous <Continuous>  dextrose 50% Injectable 25 Gram(s) IV Push once  dextrose 50% Injectable 12.5 Gram(s) IV Push once  dextrose 50% Injectable 25 Gram(s) IV Push once  diazepam    Tablet 1 milliGRAM(s) Oral at bedtime  DULoxetine 30 milliGRAM(s) Oral <User Schedule>  furosemide   Injectable 20 milliGRAM(s) IV Push daily  glucagon  Injectable 1 milliGRAM(s) IntraMuscular once  heparin   Injectable 5000 Unit(s) SubCutaneous every 12 hours  hydrocortisone 2.5% Cream 1 Application(s) Topical two times a day  insulin glargine Injectable (LANTUS) 12 Unit(s) SubCutaneous at bedtime  insulin lispro (ADMELOG) corrective regimen sliding scale   SubCutaneous three times a day before meals  lactulose Syrup 10 Gram(s) Oral at bedtime  levothyroxine 25 MICROGram(s) Oral <User Schedule>  levothyroxine 50 MICROGram(s) Oral <User Schedule>  metoprolol succinate ER 25 milliGRAM(s) Oral daily  midodrine. 10 milliGRAM(s) Oral three times a day  pantoprazole    Tablet 40 milliGRAM(s) Oral before breakfast  polyethylene glycol 3350 17 Gram(s) Oral two times a day  prasugrel 10 milliGRAM(s) Oral daily  senna 2 Tablet(s) Oral at bedtime        VITALS:  T(F): 96.2 (23 @ 19:07), Max: 96.7 (23 @ 13:29)  HR: 78 (23 @ 05:39)  BP: 106/67 (23 @ 05:39)  RR: 18 (23 @ 05:39)  SpO2: 93% (23 @ 05:39)  Wt(kg): --     @ 07:01  -   @ 07:00  --------------------------------------------------------  IN: 210 mL / OUT: 1680 mL / NET: -1470 mL     @ 07:01  -   @ 07:00  --------------------------------------------------------  IN: 858 mL / OUT: 1200 mL / NET: -342 mL     @ 07:01  -   @ 09:26  --------------------------------------------------------  IN: 0 mL / OUT: 300 mL / NET: -300 mL          PHYSICAL EXAM:  Constitutional: NAD  HEENT: anicteric sclera, oropharynx clear, MMM  Neck: No JVD  Respiratory: CTAB, no wheezes, rales or rhonchi  Cardiovascular: S1, S2, RRR  Gastrointestinal: BS+, soft, NT/ND  Extremities: No cyanosis or clubbing. No peripheral edema  :  No fields.   Skin: No rashes    LABS:      136  |  95<L>  |  55<H>  ----------------------------<  143<H>  4.1   |  29  |  2.0<H>    Ca    8.8      22 May 2023 06:08  Mg     2.0         TPro  6.2  /  Alb  3.1<L>  /  TBili  1.3<H>  /  DBili      /  AST  43<H>  /  ALT  54<H>  /  AlkPhos  131<H>                            9.7    6.91  )-----------( 358      ( 21 May 2023 05:26 )             33.3       Urine Studies:  Urinalysis Basic - ( 20 May 2023 11:00 )    Color: Yellow / Appearance: Slightly Turbid / S.028 / pH:   Gluc:  / Ketone: Negative  / Bili: Negative / Urobili: 3 mg/dL   Blood:  / Protein: 300 mg/dL / Nitrite: Negative   Leuk Esterase: Large / RBC: 143 /HPF /  /HPF   Sq Epi:  / Non Sq Epi:  / Bacteria: Negative      Sodium, Random Urine: <20.0 mmoL/L ( 11:00)  Creatinine, Random Urine: 110 mg/dL ( 11:00)  Protein/Creatinine Ratio Calculation: 2.3 Ratio ( 11:)  Osmolality, Random Urine: 359 mos/kg ( @ 11:00)  Potassium, Random Urine: 46 mmol/L ( 11:00)      Iron 27, TIBC 342, %sat 8      [23 @ 07:36]  Ferritin 78      [23 @ 07:36]  HbA1c 7.7      [20 @ 05:49]  TSH 5.61      [23 @ 05:10]  Lipid: chol 85, TG 38, HDL 37, LDL --      [23 @ 17:04]    HBsAg Nonreact      [18 @ 17:47]  HCV 0.15, Nonreact      [10-22-19 @ 07:00]  HIV Nonreact      [23 @ 11:27]    OBED: titer 1:320, pattern Speckled      [23 @ 17:04]  C3 Complement 114      [23 @ 06:06]  C4 Complement 16      [23 @ 06:06]  Syphilis Screen (Treponema Pallidum Ab) Negative      [09-10-20 @ 10:53]  Free Light Chains: kappa 11.08, lambda 4.40, ratio = 2.52      [ 17:00]  Immunofixation Serum:    Weak IgG Kappa Band Identified    Reference Range: None Detected      [21 @ 17:00]  Immunofixation Urine: Reference Range: None Detected      [21 @ 20:33]  UPEP Interpretation: Normal Electrophoresis Pattern      [21 @ 20:33]      RADIOLOGY & ADDITIONAL STUDIES:   Nephrology progress note  Patient is seen and examined, events over the last 24 h noted .  Lying in bed comfortable     Allergies:  ACE inhibitors (Rash)  Entresto (Swelling)  Atorvastatin Calcium (Unknown)  Bactrim (Unknown)  Plavix (Unknown)    Hospital Medications:   MEDICATIONS  (STANDING):  aspirin enteric coated 81 milliGRAM(s) Oral daily  dextrose 5% + sodium chloride 0.9%. 1000 milliLiter(s) (50 mL/Hr) IV Continuous <Continuous>  diazepam    Tablet 1 milliGRAM(s) Oral at bedtime  DULoxetine 30 milliGRAM(s) Oral <User Schedule>  furosemide   Injectable 20 milliGRAM(s) IV Push daily  glucagon  Injectable 1 milliGRAM(s) IntraMuscular once  heparin   Injectable 5000 Unit(s) SubCutaneous every 12 hours  hydrocortisone 2.5% Cream 1 Application(s) Topical two times a day  insulin glargine Injectable (LANTUS) 12 Unit(s) SubCutaneous at bedtime  insulin lispro (ADMELOG) corrective regimen sliding scale   SubCutaneous three times a day before meals  lactulose Syrup 10 Gram(s) Oral at bedtime  levothyroxine 25 MICROGram(s) Oral <User Schedule>  levothyroxine 50 MICROGram(s) Oral <User Schedule>  metoprolol succinate ER 25 milliGRAM(s) Oral daily  midodrine. 10 milliGRAM(s) Oral three times a day  pantoprazole    Tablet 40 milliGRAM(s) Oral before breakfast  polyethylene glycol 3350 17 Gram(s) Oral two times a day  prasugrel 10 milliGRAM(s) Oral daily  senna 2 Tablet(s) Oral at bedtime        VITALS:  T(F): 96.2 (23 @ 19:07), Max: 96.7 (23 @ 13:29)  HR: 78 (23 @ 05:39)  BP: 106/67 (23 @ 05:39)  RR: 18 (23 @ 05:39)  SpO2: 93% (23 @ 05:39)       @ 07:01  -   @ 07:00  --------------------------------------------------------  IN: 210 mL / OUT: 1680 mL / NET: -1470 mL     @ 07: @ 07:00  --------------------------------------------------------  IN: 858 mL / OUT: 1200 mL / NET: -342 mL     @ 07: @ 09:26  --------------------------------------------------------  IN: 0 mL / OUT: 300 mL / NET: -300 mL          PHYSICAL EXAM:  Constitutional: NAD  Respiratory: CTAB  Cardiovascular: S1, S2, RRR  Gastrointestinal: BS+, soft, NT/ND  Extremities: No cyanosis or clubbing. No peripheral edema  :  No fields.   Skin: No rashes    LABS:      136  |  95<L>  |  55<H>  ----------------------------<  143<H>  4.1   |  29  |  2.0<H>  Creatinine Trend: 2.0<--, 2.2<--, 2.6<--, 2.2<--, 1.9<--, 1.8<--  Ca    8.8      22 May 2023 06:08  Mg     2.0         TPro  6.2  /  Alb  3.1<L>  /  TBili  1.3<H>  /  DBili      /  AST  43<H>  /  ALT  54<H>  /  AlkPhos  131<H>                            9.7    6.91  )-----------( 358      ( 21 May 2023 05:26 )             33.3       Urine Studies:  Urinalysis Basic - ( 20 May 2023 11:00 )    Color: Yellow / Appearance: Slightly Turbid / S.028 / pH:   Gluc:  / Ketone: Negative  / Bili: Negative / Urobili: 3 mg/dL   Blood:  / Protein: 300 mg/dL / Nitrite: Negative   Leuk Esterase: Large / RBC: 143 /HPF /  /HPF   Sq Epi:  / Non Sq Epi:  / Bacteria: Negative      Sodium, Random Urine: <20.0 mmoL/L ( @ 11:00)  Creatinine, Random Urine: 110 mg/dL ( 11:00)  Protein/Creatinine Ratio Calculation: 2.3 Ratio (:)  Osmolality, Random Urine: 359 mos/kg ( 11:00)  Potassium, Random Urine: 46 mmol/L ( 11:00)      Iron 27, TIBC 342, %sat 8      [23 @ 07:36]  Ferritin 78      [23 @ 07:36]  HbA1c 7.7      [20 @ 05:49]  TSH 5.61      [23 @ 05:10]  Lipid: chol 85, TG 38, HDL 37, LDL --      [23 @ 17:04]    HBsAg Nonreact      [18 @ 17:47]  HCV 0.15, Nonreact      [10-22-19 @ 07:00]  HIV Nonreact      [23 @ 11:27]    OBED: titer 1:320, pattern Speckled      [23 @ 17:04]  C3 Complement 114      [23 @ 06:06]  C4 Complement 16      [23 @ 06:06]  Syphilis Screen (Treponema Pallidum Ab) Negative      [09-10-20 @ 10:53]  Free Light Chains: kappa 11.08, lambda 4.40, ratio = 2.52      [ @ 17:00]  Immunofixation Serum:    Weak IgG Kappa Band Identified    Reference Range: None Detected      [21 @ 17:00]  Immunofixation Urine: Reference Range: None Detected      [21 @ 20:33]  UPEP Interpretation: Normal Electrophoresis Pattern      [21 @ 20:33]      RADIOLOGY & ADDITIONAL STUDIES:

## 2023-05-22 NOTE — DIETITIAN INITIAL EVALUATION ADULT - ADD RECOMMEND
Recommendation: Change diet to Consistent Carbohydrate with evening snack + continue providing DASH/TLC diet. Monitor blood glucose trends & adjust insulin regimen as needed.

## 2023-05-22 NOTE — BRIEF OPERATIVE NOTE - DISPOSITION
Pharmacy calling  for prior authorization on:      DULoxetine 40 MG Cap DR Particles 30 capsule 3 5/22/2023     Sig - Route: Take 40 mg by mouth daily. In combination with 60 mg to make 100 mg - Oral        Pharmacy for prescription has been selected.    Initiation of prior authorization needed.     [facility]

## 2023-05-22 NOTE — PROGRESS NOTE ADULT - SUBJECTIVE AND OBJECTIVE BOX
FLOWER MARISCAL 67y Male  MRN#: [id number]   Hospital Day: 4d    SUBJECTIVE  Patient is a 67y old Male who presents with a chief complaint of Mechanical fall (22 May 2023 10:22)  Currently admitted to medicine with the primary diagnosis of Fall      INTERVAL HPI AND OVERNIGHT EVENTS:  Patient was examined and seen at bedside. This morning he is resting comfortably in bed. No issues or overnight events.    OBJECTIVE  PAST MEDICAL & SURGICAL HISTORY  Status post percutaneous transluminal coronary angioplasty  2 stents    Atherosclerosis of coronary artery  CAD (coronary artery disease)    Osteoarthritis    HLD (hyperlipidemia)    Diabetes  on insulin pump    CHF (congestive heart failure)  EF ~ 25%    History of celiac disease    Diverticulitis    STEMI (ST elevation myocardial infarction)    Diabetic neuropathy  Hands and Feet    Anxiety and depression    Other postprocedural status  Fixation hardware in foot LEFT    Stented coronary artery  10/18 heart attack  INFERIOR WALL MI    S/P CABG x 1      S/P TKR (total knee replacement), right  with infection Mrsa   per pt he was cleared from MRSA infection    Surgery, elective  right knee wound debridement    History of open reduction and internal fixation (ORIF) procedure  right hip    H/O shoulder surgery  right    AICD (automatic cardioverter/defibrillator) present  St Kali    Cholecystostomy care  drain inserted  & removed 4 months ago    History of tonsillectomy    History of hip replacement, total, right    Elective surgery  plastic surgery Left shin      ALLERGIES:  ACE inhibitors (Rash)  Entresto (Swelling)  Atorvastatin Calcium (Unknown)  Bactrim (Unknown)  Plavix (Unknown)    MEDICATIONS:  STANDING MEDICATIONS  aspirin enteric coated 81 milliGRAM(s) Oral daily  dextrose 5% + sodium chloride 0.9%. 1000 milliLiter(s) IV Continuous <Continuous>  dextrose 5%. 1000 milliLiter(s) IV Continuous <Continuous>  dextrose 5%. 1000 milliLiter(s) IV Continuous <Continuous>  dextrose 50% Injectable 25 Gram(s) IV Push once  dextrose 50% Injectable 12.5 Gram(s) IV Push once  dextrose 50% Injectable 25 Gram(s) IV Push once  diazepam    Tablet 1 milliGRAM(s) Oral at bedtime  DULoxetine 30 milliGRAM(s) Oral <User Schedule>  furosemide   Injectable 20 milliGRAM(s) IV Push two times a day  glucagon  Injectable 1 milliGRAM(s) IntraMuscular once  heparin   Injectable 5000 Unit(s) SubCutaneous every 12 hours  hydrocortisone 2.5% Cream 1 Application(s) Topical two times a day  insulin glargine Injectable (LANTUS) 12 Unit(s) SubCutaneous at bedtime  insulin lispro (ADMELOG) corrective regimen sliding scale   SubCutaneous three times a day before meals  lactulose Syrup 10 Gram(s) Oral at bedtime  levothyroxine 25 MICROGram(s) Oral <User Schedule>  levothyroxine 50 MICROGram(s) Oral <User Schedule>  metoprolol succinate ER 25 milliGRAM(s) Oral daily  midodrine. 10 milliGRAM(s) Oral three times a day  pantoprazole    Tablet 40 milliGRAM(s) Oral before breakfast  polyethylene glycol 3350 17 Gram(s) Oral two times a day  prasugrel 10 milliGRAM(s) Oral daily  senna 2 Tablet(s) Oral at bedtime    PRN MEDICATIONS  dextrose Oral Gel 15 Gram(s) Oral once PRN      VITAL SIGNS: Last 24 Hours  T(C): 35.7 (21 May 2023 19:07), Max: 35.9 (21 May 2023 13:29)  T(F): 96.2 (21 May 2023 19:07), Max: 96.7 (21 May 2023 13:29)  HR: 78 (22 May 2023 05:39) (78 - 88)  BP: 106/67 (22 May 2023 05:39) (99/57 - 405/60)  BP(mean): 83 (22 May 2023 05:39) (83 - 83)  RR: 18 (22 May 2023 05:39) (18 - 18)  SpO2: 93% (22 May 2023 05:39) (93% - 93%)    LABS:                        9.7    6.91  )-----------( 358      ( 21 May 2023 05:26 )             33.3         136  |  95<L>  |  55<H>  ----------------------------<  143<H>  4.1   |  29  |  2.0<H>    Ca    8.8      22 May 2023 06:08  Mg     2.0         TPro  6.2  /  Alb  3.1<L>  /  TBili  1.3<H>  /  DBili  x   /  AST  43<H>  /  ALT  54<H>  /  AlkPhos  131<H>      PT/INR - ( 22 May 2023 06:08 )   PT: 18.40 sec;   INR: 1.59 ratio         PTT - ( 22 May 2023 06:08 )  PTT:29.7 sec  Urinalysis Basic - ( 20 May 2023 11:00 )    Color: Yellow / Appearance: Slightly Turbid / S.028 / pH: x  Gluc: x / Ketone: Negative  / Bili: Negative / Urobili: 3 mg/dL   Blood: x / Protein: 300 mg/dL / Nitrite: Negative   Leuk Esterase: Large / RBC: 143 /HPF /  /HPF   Sq Epi: x / Non Sq Epi: x / Bacteria: Negative        Physical Exam:  CONSTITUTIONAL: No acute distress, alert and following commands, AxO=3  HEAD: Atraumatic, normocephalic  EYES: EOM intact, PERRLA, conjunctiva and sclera clear  PULMONARY: decreased right lung  CARDIOVASCULAR: Regular rate and rhythm  GASTROINTESTINAL: Soft, non-tender, midly-distended; bowel sounds present  NEUROLOGY: non-focal  EXT: R AKA

## 2023-05-22 NOTE — PROGRESS NOTE ADULT - ASSESSMENT
68 yo M PMHx chronic HFrEF 15-20% s/p ICD, decreased RV function, mild MR/TR, DM type I w/ neuropathy and hx hypoglycemia, DLD, HTN, CAD, hx MI, s/p CABG, s/p stent (2018), hx in-stent thrombosis while on Plavix, PAD s/p 3 LLE stents, TIAGO (needs CPAP auto-regulating pressure 10-16, patient doesn't use it due to claustrophobia), Psoriasis, R-AKA presenting to ED s/p fall. He states he was lying in bed at home when he fell asleep and fell off the bed. Found to have moises and mild elevation of trop     Mechanical Fall   -No CT evidence for acute traumatic injury within the chest, abdomen or pelvis  -No evidence of acute intracranial pathology.   -No evidence of acute cervical spine fracture or subluxation.  -PT eval     Acute on chronic HFrEF  CRS  Type 2 NSTEMI due to demand ischemia in setting of MOISES  Moderate to large bilateral pleural effusions, right greater than left.   Small to moderate volume abdominopelvic ascites.  - c/w telemetry  If BP decreases will need CCU evaluation and upgrade.  - case d/w cardiology attending today. DIuretics increased. HF team recommended bumex and hypertonic saline  - c/w aspirin, prasugrel, metoprolol  - had "anaphlyatic reaction" to ACE-I (presumably angioedema?) and has allergy to ENtresto  - effusion and ascites likely due to decompensated CHF. Paracentesis offered to pt to r/o SBP but he declined. Agreeable to abdominal US to further assess amt   - EKG shows atrial paced rhythm  - BNP 9,132    MOISES on CKD III  - as per pt he has issues with urinary retention from time to time  - RBUS showedMild left hydronephrosis/dilated pelvis, unchanged. Left lower pole 0.5   cm nonobstructing calculus.-->urology eval when cardiac stable  - SCr downtrending, suggests CRS  - pt has fields in place for retention/I&O    Transaminitis  - has mildly elevated LFTs including INR  - likely due to congestion  - repeat INR  - RUQ sono appreciated, no apparent hepatobiliary issues  - check ggt        DM type 1 complicated by hypoglycemia  episodes of hypoglycemia at home  - Insulin protocol lantus and nutritional insulin was decreased.     Hypothyroid   -c/w  synthroid    Mild hyperkalemia  - resolved    Recent dx of RA   * pt was dx with RA due to upper ext arterial ulcer?   - Cont Methotrexate (last dose was 05/18/2023)     Psoriasis  - pt states this is a wrong diagnosis  - nails consistent with PsA.    # DVT prophylaxis - lovenox   #R-LE non-weight bearing  #carb-consistent/dash diet  #full code    #Progress Note Handoff:  Pending (specify):  Clinial improvement  Family discussion: Spoke with pt regarding above  Disposition: Home___/SNF___/Other________/Unknown at this time____x____

## 2023-05-22 NOTE — DIETITIAN INITIAL EVALUATION ADULT - PERTINENT LABORATORY DATA
05-22    136  |  95<L>  |  55<H>  ----------------------------<  143<H>  4.1   |  29  |  2.0<H>    Ca    8.8      22 May 2023 06:08  Mg     2.0     05-22    TPro  6.2  /  Alb  3.1<L>  /  TBili  1.3<H>  /  DBili  x   /  AST  43<H>  /  ALT  54<H>  /  AlkPhos  131<H>  05-22  POCT Blood Glucose.: 256 mg/dL (05-22-23 @ 16:26)  A1C with Estimated Average Glucose Result: 8.7 % (05-19-23 @ 06:23)  A1C with Estimated Average Glucose Result: 9.2 % (02-23-23 @ 17:04)  A1C with Estimated Average Glucose Result: 9.8 % (01-12-23 @ 05:39)    Downtrend of HgbA1C observed over past 5 months.

## 2023-05-22 NOTE — DIETITIAN INITIAL EVALUATION ADULT - NSFNSGIIOFT_GEN_A_CORE
Adjusted IBW: 75.2 kg.    Dosing wt 80.2 kg (5/19). Daily wts: 80.2 kg (5/20), 79.6 kg (5/22).    BMI adjusted for R AKA is 25.6. BMI calculated using latest daily wt 79.6 kg (5/22).

## 2023-05-22 NOTE — CONSULT NOTE ADULT - SUBJECTIVE AND OBJECTIVE BOX
Date of Admission: 23    CHIEF COMPLAINT: Patient is a 67y old  Male who presents with a chief complaint of Mechanical fall (22 May 2023 12:56)    HISTORY OF PRESENT ILLNESS: 67-year-old male w/ HFrEF 15-20% s/p ICD, decreased RV function, mild MR/TR, DM type I w/ neuropathy and hx hypoglycemia , DL, HTN, CAD, hx MI, s/p CABG, s/p stent (2018), hx in-stent thrombosis while on Plavix, PAD s/p 3 LLE stents, TIAGO (needs CPAP auto-regulating pressure 10-16, patient doesn't use it due to claustrophobia), Psoriasis, R-AKA presenting to ED s/p fall. He states he was lying in bed at home when he fell asleep and fell off the bed, and complains of generalized headache neck pain, non-radiating, no alleviating or aggravating factors, associated with right anterior chest wall pain. Denies LOC,  fevers, chills, nausea, vomiting, dizziness, abdominal pain, shortness of breath. States he had TDAP recently. Pt was not down for long as wife was upstairs when he fell off the bed and called EMS right away.   Labs significant for cr 1.8 (baseline ~ 1.2)  trop 0.10 (18 May 2023 14:16)    Patient seen and assessed at bedside with spouse present. Patient reports having a known history of low EF, follows w/ Dr. Foote (primary cardiologist) and Dr. Mejia (EP). Stated he takes Lasix 20mg daily at home, recently increased to 40mg daily as he has been experiencing progressively worsening SOB x2 weeks. Admits to eating a moderate Na diet at home. Endorses compliance with home GDMT. States he has been mostly wheelchair bound x1 year 2/2 to musculoskeletal issues.      PAST MEDICAL & SURGICAL HISTORY:  Status post percutaneous transluminal coronary angioplasty  2 stents  Atherosclerosis of coronary artery  CAD (coronary artery disease)  Osteoarthritis  HLD (hyperlipidemia)  Diabetes  on insulin pump  CHF (congestive heart failure)  EF ~ 25%  History of celiac disease  Diverticulitis  STEMI (ST elevation myocardial infarction)  Diabetic neuropathy  Hands and Feet  Anxiety and depression  Other postprocedural status  Fixation hardware in foot LEFT  Stented coronary artery  10/18 heart attack  INFERIOR WALL MI  S/P CABG x 1  2018  S/P TKR (total knee replacement), right  with infection Mrsa  per pt he was cleared from MRSA infection  Surgery, elective  right knee wound debridement  History of open reduction and internal fixation (ORIF) procedure  right hip  H/O shoulder surgery  right  AICD (automatic cardioverter/defibrillator) present  St Kali  Cholecystostomy care  drain inserted  & removed 4 months ago  History of tonsillectomy  History of hip replacement, total, right  Elective surgery  plastic surgery Left shin      FAMILY HISTORY: Patient was adopted, family history unknown to him  SOCIAL HISTORY:  Denies smoking, alcohol, or drug use      Allergies  ACE inhibitors (Rash)  Entresto (Swelling)  Atorvastatin Calcium (Unknown)  Bactrim (Unknown)  Plavix (Unknown)    Intolerances    	    REVIEW OF SYSTEMS:  CONSTITUTIONAL: No fever, weight loss, + generalized fatigue.  CARDIOLOGY: Patient denies chest pain, + shortness of breath on exertion, no syncopal episodes.   RESPIRATORY: + shortness of breath on exertion, no wheezing.   NEUROLOGICAL: + generalized weakness, no focal deficits to report.  GI: no BRBPR, no n/v, diarrhea.    PSYCHIATRY: forgetful, oriented x2-3  HEENT: no nasal discharge, no ecchymosis  SKIN: no ecchymosis, no breakdown  MUSCULOSKELETAL: Full range of motion x3, R AKA.     PHYSICAL EXAM:  T(C): 35.6 (23 @ 13:45), Max: 35.7 (23 @ 19:07)  HR: 84 (23 @ 13:45) (78 - 87)  BP: 106/58 (23 @ 13:45) (99/57 - 106/67)  RR: 18 (23 @ 13:45) (18 - 18)  SpO2: 93% (23 @ 05:39) (93% - 93%)  Wt(kg): --  I&O's Summary    21 May 2023 07:01  -  22 May 2023 07:00  --------------------------------------------------------  IN: 858 mL / OUT: 1200 mL / NET: -342 mL    22 May 2023 07:01  -  22 May 2023 14:19  --------------------------------------------------------  IN: 210 mL / OUT: 300 mL / NET: -90 mL        General Appearance: normal for age and gender. 	  Neck: JVP [id number]   Eyes: Extra Ocular muscles intact.   Cardiovascular: regular rate and rhythm S1 S2, No edema  Respiratory: decreased B/L  Psychiatry: Alert and oriented x 2-3, poor historian  Gastrointestinal:  Soft, Non-tender  Skin/Integumentary: No rashes, No ecchymoses, No cyanosis	  Neurologic: Non-focal  Musculoskeletal/ extremities: R AKA, No clubbing, cyanosis or edema  Vascular: Peripheral pulses palpable 2+, LLE and B/L UE         LABS:	 	                          9.7    7.91  )-----------( 299      ( 22 May 2023 06:08 )             34.4         136  |  95<L>  |  55<H>  ----------------------------<  143<H>  4.1   |  29  |  2.0<H>    Ca    8.8      22 May 2023 06:08  Mg     2.0         TPro  6.2  /  Alb  3.1<L>  /  TBili  1.3<H>  /  DBili  x   /  AST  43<H>  /  ALT  54<H>  /  AlkPhos  131<H>          PT/INR - ( 22 May 2023 06:08 )   PT: 18.40 sec;   INR: 1.59 ratio         PTT - ( 22 May 2023 06:08 )  PTT:29.7 sec      CARDIAC MARKERS:  Pro-Brain Natriuretic Peptide: 9132 pg/mL (23 @ 07:01)        TELEMETRY EVENTS: 	    EC23    Ventricular Rate 79 BPM  Atrial Rate 79 BPM  P-R Interval 170 ms  QRS Duration 158 ms  Q-T Interval 480 ms  QTC Calculation(Bazett) 550 ms  P Axis 51 degrees  R Axis -35 degrees  T Axis 65 degrees    Diagnosis Line Atrial-sensed ventricular-paced rhythm  Abnormal ECG    Confirmed by London Santos ([id number]) on 2023 4:12:01 PM      PREVIOUS DIAGNOSTIC TESTING:    TTE 23    Summary:   1. Left ventricular ejection fraction, by visual estimation, is <20%.   2. Severely decreased global left ventricular systolic function.   3. Mildly increased LV wall thickness.   4. Severe concentric left ventricular hypertrophy.   5. Spectral Doppler shows restrictive pattern of left ventricular   myocardial filling (Grade III diastolic dysfunction).   6. Moderately enlarged right ventricle.   7. Moderately reduced RV systolic function.   8. Elevated mean left atrial pressure.   9. Moderately enlarged left atrium.  10.Moderate mitral valve regurgitation.  11. The mitral valve leaflets are tethered which is due to reduced   systolic function and elevated LVDP.  12. Moderate-severe tricuspid regurgitation.  13. Estimated pulmonary artery systolic pressure is 48.4 mmHg assuming a   right atrial pressure of 8 mmHg, which is consistent with mild pulmonary   hypertension.  14. Patient is in normal sinus rhythm.  15. Compared to the prior TTE dated 21, the patient's cardiomyopathy   has worsened.      Left Heart Catheterization: 18    IMPRESSIONS: There is significant single vessel native coronary artery  disease. Patent LIMA to LAD. No significant restenosis in RCA/OM1.    	    Home Medications:  aspirin 81 mg oral delayed release tablet: 1 tab(s) orally once a day (2023 02:35)  diazePAM 2 mg oral tablet: 0.5 tab(s) orally once a day (at bedtime) (2023 02:35)  DULoxetine 30 mg oral delayed release capsule: 1 cap(s) orally 3 times a day (2023 02:35)  insulin glargine 100 units/mL subcutaneous solution: 25 unit(s) subcutaneous once a day (at bedtime) (2023 02:35)  insulin glargine 100 units/mL subcutaneous solution: 40 microcurie subcutaneous once a day (2023 02:35)  insulin lispro 100 units/mL injectable solution: if your finger stick is 150-199 please inject 2 units  if your finger stick is 200-250 please inject 4 units  if your finger stick is 251-300 please inject 6 units  if your finger stick is 301-350 please inject 8 units  if your finger stick is more than 350 please call your physician    (2023 02:35)  levothyroxine 25 mcg (0.025 mg) oral tablet: 1 tab(s) orally 6 times a week (2023 02:35)  levothyroxine 50 mcg (0.05 mg) oral tablet: 1 tab(s) orally once a week (2023 02:35)  metoprolol succinate 25 mg oral tablet, extended release: 1 tab(s) orally once a day (2023 02:35)  Multiple Vitamins oral tablet: 1 tab(s) orally once a day (2023 02:35)  pantoprazole 40 mg oral delayed release tablet: 1 tab(s) orally once a day (before a meal) (2023 02:35)  prasugrel 10 mg oral tablet: 1 tab(s) orally once a day (2023 02:35)  rosuvastatin 40 mg oral tablet: 1 tab(s) orally once a day (2023 02:35)  spironolactone 25 mg oral tablet: 1 tab(s) orally once a day (2023 02:35)    MEDICATIONS  (STANDING):  aspirin enteric coated 81 milliGRAM(s) Oral daily  dextrose 5% + sodium chloride 0.9%. 1000 milliLiter(s) (50 mL/Hr) IV Continuous <Continuous>  dextrose 5%. 1000 milliLiter(s) (100 mL/Hr) IV Continuous <Continuous>  dextrose 5%. 1000 milliLiter(s) (50 mL/Hr) IV Continuous <Continuous>  dextrose 50% Injectable 25 Gram(s) IV Push once  dextrose 50% Injectable 12.5 Gram(s) IV Push once  dextrose 50% Injectable 25 Gram(s) IV Push once  diazepam    Tablet 1 milliGRAM(s) Oral at bedtime  DULoxetine 30 milliGRAM(s) Oral <User Schedule>  furosemide   Injectable 20 milliGRAM(s) IV Push two times a day  glucagon  Injectable 1 milliGRAM(s) IntraMuscular once  heparin   Injectable 5000 Unit(s) SubCutaneous every 12 hours  hydrocortisone 2.5% Cream 1 Application(s) Topical two times a day  insulin glargine Injectable (LANTUS) 12 Unit(s) SubCutaneous at bedtime  insulin lispro (ADMELOG) corrective regimen sliding scale   SubCutaneous three times a day before meals  lactulose Syrup 10 Gram(s) Oral at bedtime  levothyroxine 25 MICROGram(s) Oral <User Schedule>  levothyroxine 50 MICROGram(s) Oral <User Schedule>  metoprolol succinate ER 25 milliGRAM(s) Oral daily  midodrine. 10 milliGRAM(s) Oral three times a day  pantoprazole    Tablet 40 milliGRAM(s) Oral before breakfast  polyethylene glycol 3350 17 Gram(s) Oral two times a day  prasugrel 10 milliGRAM(s) Oral daily  senna 2 Tablet(s) Oral at bedtime    MEDICATIONS  (PRN):  dextrose Oral Gel 15 Gram(s) Oral once PRN Blood Glucose LESS THAN 70 milliGRAM(s)/deciliter

## 2023-05-22 NOTE — DIETITIAN INITIAL EVALUATION ADULT - OTHER INFO
Pertinent Medical Information: Pt presented to ED s/p fall. Found to have MOISES & mild elevation of troponin. Per progress notes, likely Acute systolic CHF complicated by MOISES (cardiorenal) and Troponinemia. Mild hyperkalemia noted resolved this admit. DM type 1 complicated by hypoglycemia at home - insulin protocol lantus and nutritional insulin was decreased per progress notes. Ascites & pleural effusion noted.    PMH includes HFrEF 15-20% s/p ICD, decreased RV function, mild MR/TR, DM type I w/ neuropathy and hx hypoglycemia , DL, HTN, CAD, hx MI, s/p CABG, s/p stent (2018), hx in-stent thrombosis while on Plavix, PAD s/p 3 LLE stents, TIAGO (needs CPAP auto-regulating pressure 10-16, patient doesn't use it due to claustrophobia), Psoriasis, R-AKA.

## 2023-05-22 NOTE — DIETITIAN INITIAL EVALUATION ADULT - NS FNS DIET ORDER
DASH/TLC + Consistent Carbohydrate (no snacks). Pt reportedly with good appetite at this time; pt consuming % of meals at this time. RN notes pt is consuming % of meals as per flowsheet documentation.

## 2023-05-22 NOTE — CONSULT NOTE ADULT - PROBLEM SELECTOR RECOMMENDATION 5
- ongoing [facility]  ______________  Valente Mendoza MD  Palliative Medicine  [facility]  [phone]

## 2023-05-22 NOTE — DIETITIAN INITIAL EVALUATION ADULT - ORAL INTAKE PTA/DIET HISTORY
Pt reportedly follows a carbohydrate controlled diet PTP. Reportedly consumes 3 meals/day + 1 snack. Reportedly with fair appetite & po intake PTP. NKFA. Takes MVI. No oral supplements reported. Reported .1 kg late last year but wt change occurred following amputation. Reportedly does not know UBW following amputation but reportedly no known h/o unintentional wt loss. No signs of significant muscle wasting/subcutaneous fat loss observed at this time. No food preferences reported. No dietary restrictions related to culture/Caodaism.

## 2023-05-22 NOTE — DIETITIAN INITIAL EVALUATION ADULT - PERTINENT MEDS FT
MEDICATIONS  (STANDING):  aspirin enteric coated 81 milliGRAM(s) Oral daily  buMETAnide Injectable 2 milliGRAM(s) IV Push every 12 hours  chlorhexidine 2% Cloths 1 Application(s) Topical daily  dextrose 5% + sodium chloride 0.9%. 1000 milliLiter(s) (50 mL/Hr) IV Continuous <Continuous>  dextrose 5%. 1000 milliLiter(s) (100 mL/Hr) IV Continuous <Continuous>  dextrose 5%. 1000 milliLiter(s) (50 mL/Hr) IV Continuous <Continuous>  dextrose 50% Injectable 25 Gram(s) IV Push once  dextrose 50% Injectable 25 Gram(s) IV Push once  dextrose 50% Injectable 12.5 Gram(s) IV Push once  diazepam    Tablet 1 milliGRAM(s) Oral at bedtime  DULoxetine 30 milliGRAM(s) Oral <User Schedule>  glucagon  Injectable 1 milliGRAM(s) IntraMuscular once  heparin   Injectable 5000 Unit(s) SubCutaneous every 12 hours  hydrocortisone 2.5% Cream 1 Application(s) Topical two times a day  insulin glargine Injectable (LANTUS) 12 Unit(s) SubCutaneous at bedtime  insulin lispro (ADMELOG) corrective regimen sliding scale   SubCutaneous three times a day before meals  lactulose Syrup 10 Gram(s) Oral at bedtime  levothyroxine 25 MICROGram(s) Oral <User Schedule>  levothyroxine 50 MICROGram(s) Oral <User Schedule>  metoprolol succinate ER 25 milliGRAM(s) Oral daily  midodrine. 10 milliGRAM(s) Oral three times a day  pantoprazole    Tablet 40 milliGRAM(s) Oral before breakfast  polyethylene glycol 3350 17 Gram(s) Oral two times a day  prasugrel 10 milliGRAM(s) Oral daily  senna 2 Tablet(s) Oral at bedtime  sodium chloride 3%. 150 milliLiter(s) (50 mL/Hr) IV Continuous <Continuous>    MEDICATIONS  (PRN):  acetaminophen     Tablet .. 325 milliGRAM(s) Oral every 4 hours PRN Moderate Pain (4 - 6)  dextrose Oral Gel 15 Gram(s) Oral once PRN Blood Glucose LESS THAN 70 milliGRAM(s)/deciliter  oxyCODONE    IR 10 milliGRAM(s) Oral four times a day PRN Moderate Pain (4 - 6)

## 2023-05-22 NOTE — DIETITIAN INITIAL EVALUATION ADULT - ORAL NUTRITION SUPPLEMENTS
Pt at low nutrition risk; RD to follow-up in 7-10 days.  Monitor: Skin, labs, BM, wt, nutrition focused physical findings, body composition, diet order, GI, adherence.

## 2023-05-23 NOTE — PROGRESS NOTE ADULT - SUBJECTIVE AND OBJECTIVE BOX
HPI:  67-year-old male w/ HFrEF 15-20% s/p ICD, decreased RV function, mild MR/TR, DM type I w/ neuropathy and hx hypoglycemia , DL, HTN, CAD, hx MI, s/p CABG, s/p stent (2018), hx in-stent thrombosis while on Plavix, PAD s/p 3 LLE stents, TIAGO (needs CPAP auto-regulating pressure 10-16, patient doesn't use it due to claustrophobia), Psoriasis, R-AKA presenting to ED s/p fall. He states he was lying in bed at home when he fell asleep and fell off the bed, and complains of generalized headache neck pain, non-radiating, no alleviating or aggravating factors, associated with right anterior chest wall pain. Denies LOC,  fevers, chills, nausea, vomiting, dizziness, abdominal pain, shortness of breath. States he had TDAP recently. Pt was not down for long as wife was upstairs when he fell off the bed and called EMS right away.     Palliative care consulted for goals of care and symptom management.       ISTOP reviewed   PDI	First Name	Last Name	Birth Date	Gender	Street Address	[facility]	Zip Code  EVONNE Blas	1955	Male	[address] [address]    Prescription Information      PDI Filter:    PDI	Current Rx	Drug Type	Rx Written	Rx Dispensed	Drug	Quantity	Days Supply	Prescriber Name	Prescriber WILLARD #	Payment Method	Dispenser  A	Y	B	05/18/2023	05/20/2023	diazepam 2 mg tablet	15	30	Kurt Caicedo	[id number]	Medicare	[id number]  A	Y	O	05/05/2023	05/08/2023	oxycodone-acetaminophen  mg tab	120	30	Caleb Laguna	[id number]	Medicare	[id number]  A	N	O	04/07/2023	04/08/2023	oxycodone-acetaminophen  mg tab	28	7	Caleb Laguna	[id number]	Medicare	[id number]  A	N	O	03/31/2023	03/31/2023	oxycodone-acetaminophen 5-325 mg tablet	28	7	Caleb Laguna	[id number]	Medicare	[id number]  A	N	B	03/16/2023	03/20/2023	diazepam 2 mg tablet	15	30	Sascha Kurt	[id number]	Medicare	[id number]  A	N	O	03/08/2023	03/08/2023	tramadol hcl 50 mg tablet	30	30	Cedric Nelson	[id number]	Medicare	[id number]  A	N	B	01/11/2023	01/21/2023	diazepam 2 mg tablet	15	30	Magoudlniraj, Kurt	[id number]	Medicare	[id number]  A	N	B	09/30/2022	10/25/2022	diazepam 2 mg tablet	15	30	Magoudlniraj, Kurt	[id number]	Medicare	[id number]  A	N	B	09/27/2022	09/28/2022	diazepam 2 mg tablet	60	30	[facility]	[id number]	Medicare	[facility]  A	N	B	07/14/2022	07/15/2022	diazepam 2 mg tablet	60	30	[facility]	[id number]	Medicare	[facility]  A	N	O	07/06/2022	07/06/2022	oxycodone hcl (ir) 5 mg tablet	20	5	Anita Alonso	[id number]	Medicare	[id number]  A	N	O	06/28/2022	06/28/2022	oxycodone hcl (ir) 5 mg tablet	10	3	[facility]	[id number]	Medicare	[facility]    * - Details of Drug Type : O = Opioid, B = Benzodiazepine, S = Stimulant    * - Drugs marked with an asterisk are compound drugs. If the compound drug is made up of more than one controlled substance, then each controlled substance will be a separate row in the tabl  Interval History    ADVANCE DIRECTIVES:    [ x] Full Code [ ] DNR  MOLST  [ ]  Living Will  [ ]   DECISION MAKER(s):  [ ] Health Care Proxy(s)  [ x] Surrogate(s)  [ ] Guardian           Name(s): Phone Number(s):  Raine Spouse - is HCP   Answers: Emergency Contact Name Laura,  Answers: Emergency Contact Phone # [phone],  Answers: Emergency Contact Relationship dgt  BASELINE (I)ADL(s) (prior to admission):  [place]: [ ]Total  [x ] Moderate [ ]Dependent    Allergies    ACE inhibitors (Rash)  Entresto (Swelling)  Atorvastatin Calcium (Unknown)  Bactrim (Unknown)  Plavix (Unknown)    Intolerances    MEDICATIONS  (STANDING):  aspirin enteric coated 81 milliGRAM(s) Oral daily  buMETAnide Injectable 2 milliGRAM(s) IV Push every 12 hours  chlorhexidine 2% Cloths 1 Application(s) Topical daily  dextrose 5% + sodium chloride 0.9%. 1000 milliLiter(s) (50 mL/Hr) IV Continuous <Continuous>  dextrose 5%. 1000 milliLiter(s) (50 mL/Hr) IV Continuous <Continuous>  dextrose 5%. 1000 milliLiter(s) (100 mL/Hr) IV Continuous <Continuous>  dextrose 50% Injectable 25 Gram(s) IV Push once  dextrose 50% Injectable 12.5 Gram(s) IV Push once  dextrose 50% Injectable 25 Gram(s) IV Push once  diazepam    Tablet 1 milliGRAM(s) Oral at bedtime  DULoxetine 30 milliGRAM(s) Oral <User Schedule>  glucagon  Injectable 1 milliGRAM(s) IntraMuscular once  heparin   Injectable 5000 Unit(s) SubCutaneous every 12 hours  hydrocortisone 2.5% Cream 1 Application(s) Topical two times a day  insulin glargine Injectable (LANTUS) 12 Unit(s) SubCutaneous at bedtime  insulin lispro (ADMELOG) corrective regimen sliding scale   SubCutaneous three times a day before meals  lactulose Syrup 10 Gram(s) Oral at bedtime  levothyroxine 25 MICROGram(s) Oral <User Schedule>  levothyroxine 50 MICROGram(s) Oral <User Schedule>  metoprolol succinate ER 25 milliGRAM(s) Oral daily  midodrine. 10 milliGRAM(s) Oral three times a day  pantoprazole    Tablet 40 milliGRAM(s) Oral before breakfast  polyethylene glycol 3350 17 Gram(s) Oral two times a day  prasugrel 10 milliGRAM(s) Oral daily  senna 2 Tablet(s) Oral at bedtime  sodium chloride 3%. 150 milliLiter(s) (50 mL/Hr) IV Continuous <Continuous>  sodium chloride 3%. 150 milliLiter(s) (50 mL/Hr) IV Continuous <Continuous>    MEDICATIONS  (PRN):  acetaminophen     Tablet .. 325 milliGRAM(s) Oral every 4 hours PRN Moderate Pain (4 - 6)  dextrose Oral Gel 15 Gram(s) Oral once PRN Blood Glucose LESS THAN 70 milliGRAM(s)/deciliter  oxyCODONE    IR 10 milliGRAM(s) Oral four times a day PRN Moderate Pain (4 - 6)    PRESENT SYMPTOMS: [ ]Unable to obtain due to poor mentation   Source if other than patient:  [ ]Family   [ ]Team     Pain: [ ]yes [ ]no  QOL impact -   Location -                    Aggravating factors -  Quality -  Radiation -  Timing-  Severity (0-10 scale):  Minimal acceptable level (0-10 scale):         Dyspnea:                           [ ]None[ ]Mild [ ]Moderate [ ]Severe   Anxiety:                             [ ]None[ ]Mild [ ]Moderate [ ]Severe   Fatigue:                             [ ]None[ ]Mild [ ]Moderate [ ]Severe   Nausea:                             [ ]None[ ]Mild [ ]Moderate [ ]Severe   Loss of appetite:              [ ]None[ ]Mild [ ]Moderate [ ]Severe   Constipation:                    [ ]None[ ]Mild [ ]Moderate [ ]Severe    Other Symptoms:  [ ]All other review of systems negative     Palliative Performance Status Version 2:         %    [email or web address]/[email or web address]_[email or web address]  PHYSICAL EXAM:  Vital Signs Last 24 Hrs  T(C): 35 (23 May 2023 04:31), Max: 35.6 (22 May 2023 13:45)  T(F): 95 (23 May 2023 04:31), Max: 96.1 (22 May 2023 13:45)  HR: 75 (23 May 2023 04:31) (75 - 84)  BP: 108/64 (23 May 2023 04:31) (104/66 - 108/64)  BP(mean): --  RR: 18 (23 May 2023 06:55) (18 - 18)  SpO2: 97% (23 May 2023 06:55) (90% - 97%)    Parameters below as of 23 May 2023 06:55  Patient On (Oxygen Delivery Method): room air     I&O's Summary    22 May 2023 07:01  -  23 May 2023 07:00  --------------------------------------------------------  IN: 750 mL / OUT: 2150 mL / NET: -1400 mL    23 May 2023 07:01  -  23 May 2023 13:16  --------------------------------------------------------  IN: 210 mL / OUT: 0 mL / NET: 210 mL        GENERAL:  [ ] No acute distress [ ]Lethargic  [ ]Unarousable  [ ]Verbal  [ ]Non-Verbal [ ]Cachexia    BEHAVIORAL/PSYCH:  [ ]Alert and Oriented x  [ ] Anxiety [ ] Delirium [ ] Agitation [ ] Calm   EYES: [ ] No scleral icterus [ ] Scleral icterus [ ] Closed  ENMT:  [ ]Dry mouth  [ ]No external oral lesions [ ] No external ear or nose lesions  CARDIOVASCULAR:  [ ]Regular [ ]Irregular [ ]Tachy [ ]Not Tachy  [ ][place] [ ] Edema [ ] No edema  PULMONARY:  [ ]Tachypnea  [ ]Audible excessive secretions [ ] No labored breathing [ ] labored breathing  GASTROINTESTINAL: [ ]Soft  [ ]Distended  [ ]Not distended [ ]Non tender [ ]Tender  MUSCULOSKELETAL: [ ]No clubbing [ ] clubbing  [ ] No cyanosis [ ] cyanosis  NEUROLOGIC: [ ]No focal deficits  [ ]Follows commands  [ ]Does not follow commands  [ ]Cognitive impairment  [ ]Dysphagia  [ ]Dysarthria  [ ]Paresis   SKIN: [ ] Jaundiced [ ] Non-jaundiced [ ]Rash [ ]No Rash [ ] Warm [ ] Dry  MISC/LINES: [ ] ET tube [ ] Trach [ ]NGT/OGT [ ]PEG [ ]Loja    CRITICAL CARE:  [ ] Shock Present  [ ]Septic [ ]Cardiogenic [ ]Neurologic [ ]Hypovolemic  [ ]  Vasopressors [ ]  Inotropes   [ ]Respiratory failure present [ ]Mechanical ventilation [ ]Non-invasive ventilatory support [ ]High flow  [ ]Acute  [ ]Chronic [ ]Hypoxic  [ ]Hypercarbic [ ]Other  [ ]Other organ failure     LABS: reviewed by me                        10.3   7.26  )-----------( 296      ( 23 May 2023 04:30 )             36.3   05-23    138  |  96<L>  |  49<H>  ----------------------------<  179<H>  3.9   |  32  |  1.8<H>    Ca    9.0      23 May 2023 04:30  Mg     2.5     05-23    TPro  7.0  /  Alb  3.6  /  TBili  1.3<H>  /  DBili  x   /  AST  32  /  ALT  45<H>  /  AlkPhos  128<H>  05-23  PT/INR - ( 22 May 2023 06:08 )   PT: 18.40 sec;   INR: 1.59 ratio         PTT - ( 22 May 2023 06:08 )  PTT:29.7 sec      RADIOLOGY & ADDITIONAL STUDIES: reviewed by me    EKG: reviewed by me        REFERRALS:   [ ]Chaplaincy  [ ]Hospice  [ ]Child Life  [ ]Social Work  [ ]Case management [ ]Holistic Therapy     Patient discussed with primary medical team MD  Palliative care education provided to patient and/or family    Goals of Care Document:    HPI:  67-year-old male w/ HFrEF 15-20% s/p ICD, decreased RV function, mild MR/TR, DM type I w/ neuropathy and hx hypoglycemia , DL, HTN, CAD, hx MI, s/p CABG, s/p stent (2018), hx in-stent thrombosis while on Plavix, PAD s/p 3 LLE stents, TIAGO (needs CPAP auto-regulating pressure 10-16, patient doesn't use it due to claustrophobia), Psoriasis, R-AKA presenting to ED s/p fall. He states he was lying in bed at home when he fell asleep and fell off the bed, and complains of generalized headache neck pain, non-radiating, no alleviating or aggravating factors, associated with right anterior chest wall pain. Denies LOC,  fevers, chills, nausea, vomiting, dizziness, abdominal pain, shortness of breath. States he had TDAP recently. Pt was not down for long as wife was upstairs when he fell off the bed and called EMS right away.     Palliative care consulted for goals of care and symptom management.       ISTOP reviewed   PDI	First Name	Last Name	Birth Date	Gender	Street Address	[facility]	Zip Code  EVONNE Blas	1955	Male	[address] [address]    Prescription Information      PDI Filter:    PDI	Current Rx	Drug Type	Rx Written	Rx Dispensed	Drug	Quantity	Days Supply	Prescriber Name	Prescriber WILLARD #	Payment Method	Dispenser  A	Y	B	05/18/2023	05/20/2023	diazepam 2 mg tablet	15	30	Kurt Caicedo	[id number]	Medicare	[id number]  A	Y	O	05/05/2023	05/08/2023	oxycodone-acetaminophen  mg tab	120	30	Caleb Laguna	[id number]	Medicare	[id number]  A	N	O	04/07/2023	04/08/2023	oxycodone-acetaminophen  mg tab	28	7	Caleb Laguna	[id number]	Medicare	[id number]  A	N	O	03/31/2023	03/31/2023	oxycodone-acetaminophen 5-325 mg tablet	28	7	Caleb Laguna	[id number]	Medicare	[id number]  A	N	B	03/16/2023	03/20/2023	diazepam 2 mg tablet	15	30	Sascha Kurt	[id number]	Medicare	[id number]  A	N	O	03/08/2023	03/08/2023	tramadol hcl 50 mg tablet	30	30	Cedric Nelson	[id number]	Medicare	[id number]  A	N	B	01/11/2023	01/21/2023	diazepam 2 mg tablet	15	30	Magjesusoudlniraj, Kurt	[id number]	Medicare	[id number]  A	N	B	09/30/2022	10/25/2022	diazepam 2 mg tablet	15	30	Magjesusoudlniraj, Kurt	[id number]	Medicare	[id number]  A	N	B	09/27/2022	09/28/2022	diazepam 2 mg tablet	60	30	[facility]	[id number]	Medicare	[facility]  A	N	B	07/14/2022	07/15/2022	diazepam 2 mg tablet	60	30	[facility]	[id number]	Medicare	[facility]  A	N	O	07/06/2022	07/06/2022	oxycodone hcl (ir) 5 mg tablet	20	5	Anita Alonso	[id number]	Medicare	[id number]  A	N	O	06/28/2022	06/28/2022	oxycodone hcl (ir) 5 mg tablet	10	3	[facility]	[id number]	Medicare	[facility]    * - Details of Drug Type : O = Opioid, B = Benzodiazepine, S = Stimulant    * - Drugs marked with an asterisk are compound drugs. If the compound drug is made up of more than one controlled substance, then each controlled substance will be a separate row in the tabl  Interval History    - Pt in bed, appears lethargic and confused. No family at bedside     ADVANCE DIRECTIVES:    [ x] Full Code [ ] DNR  MOLST  [ ]  Living Will  [ ]   DECISION MAKER(s):  [ ] Health Care Proxy(s)  [ x] Surrogate(s)  [ ] Guardian           Name(s): Phone Number(s):  Raine Spouse - is HCP   Answers: Emergency Contact Phone # [phone],  Answers: Emergency Contact Relationship wife    Answers: Emergency Contact Name Laura,  Answers: Emergency Contact Phone # [phone],  Answers: Emergency Contact Relationship dgt  BASELINE (I)ADL(s) (prior to admission):  [place]: [ ]Total  [x ] Moderate [ ]Dependent    Allergies    ACE inhibitors (Rash)  Entresto (Swelling)  Atorvastatin Calcium (Unknown)  Bactrim (Unknown)  Plavix (Unknown)    Intolerances    MEDICATIONS  (STANDING):  aspirin enteric coated 81 milliGRAM(s) Oral daily  buMETAnide Injectable 2 milliGRAM(s) IV Push every 12 hours  chlorhexidine 2% Cloths 1 Application(s) Topical daily  dextrose 5% + sodium chloride 0.9%. 1000 milliLiter(s) (50 mL/Hr) IV Continuous <Continuous>  dextrose 5%. 1000 milliLiter(s) (50 mL/Hr) IV Continuous <Continuous>  dextrose 5%. 1000 milliLiter(s) (100 mL/Hr) IV Continuous <Continuous>  dextrose 50% Injectable 25 Gram(s) IV Push once  dextrose 50% Injectable 12.5 Gram(s) IV Push once  dextrose 50% Injectable 25 Gram(s) IV Push once  diazepam    Tablet 1 milliGRAM(s) Oral at bedtime  DULoxetine 30 milliGRAM(s) Oral <User Schedule>  glucagon  Injectable 1 milliGRAM(s) IntraMuscular once  heparin   Injectable 5000 Unit(s) SubCutaneous every 12 hours  hydrocortisone 2.5% Cream 1 Application(s) Topical two times a day  insulin glargine Injectable (LANTUS) 12 Unit(s) SubCutaneous at bedtime  insulin lispro (ADMELOG) corrective regimen sliding scale   SubCutaneous three times a day before meals  lactulose Syrup 10 Gram(s) Oral at bedtime  levothyroxine 25 MICROGram(s) Oral <User Schedule>  levothyroxine 50 MICROGram(s) Oral <User Schedule>  metoprolol succinate ER 25 milliGRAM(s) Oral daily  midodrine. 10 milliGRAM(s) Oral three times a day  pantoprazole    Tablet 40 milliGRAM(s) Oral before breakfast  polyethylene glycol 3350 17 Gram(s) Oral two times a day  prasugrel 10 milliGRAM(s) Oral daily  senna 2 Tablet(s) Oral at bedtime  sodium chloride 3%. 150 milliLiter(s) (50 mL/Hr) IV Continuous <Continuous>  sodium chloride 3%. 150 milliLiter(s) (50 mL/Hr) IV Continuous <Continuous>    MEDICATIONS  (PRN):  acetaminophen     Tablet .. 325 milliGRAM(s) Oral every 4 hours PRN Moderate Pain (4 - 6)  dextrose Oral Gel 15 Gram(s) Oral once PRN Blood Glucose LESS THAN 70 milliGRAM(s)/deciliter  oxyCODONE    IR 10 milliGRAM(s) Oral four times a day PRN Moderate Pain (4 - 6)    PRESENT SYMPTOMS: [ ]Unable to obtain due to poor mentation   Source if other than patient:  [ ]Family   [ ]Team     Pain: [ ]yes [ ]no  QOL impact -   Location -                    Aggravating factors -  Quality -  Radiation -  Timing-  Severity (0-10 scale):  Minimal acceptable level (0-10 scale):         Dyspnea:                           [ ]None[ ]Mild [ ]Moderate [ ]Severe   Anxiety:                             [ ]None[ ]Mild [ ]Moderate [ ]Severe   Fatigue:                             [ ]None[ ]Mild [ ]Moderate [ ]Severe   Nausea:                             [ ]None[ ]Mild [ ]Moderate [ ]Severe   Loss of appetite:              [ ]None[ ]Mild [ ]Moderate [ ]Severe   Constipation:                    [ ]None[ ]Mild [ ]Moderate [ ]Severe    Other Symptoms:  [ ]All other review of systems negative     Palliative Performance Status Version 2:         %    http://npcrc.org/files/news/palliative_performance_scale_[email or web address].pdf  PHYSICAL EXAM:  Vital Signs Last 24 Hrs  T(C): 35 (23 May 2023 04:31), Max: 35.6 (22 May 2023 13:45)  T(F): 95 (23 May 2023 04:31), Max: 96.1 (22 May 2023 13:45)  HR: 75 (23 May 2023 04:31) (75 - 84)  BP: 108/64 (23 May 2023 04:31) (104/66 - 108/64)  BP(mean): --  RR: 18 (23 May 2023 06:55) (18 - 18)  SpO2: 97% (23 May 2023 06:55) (90% - 97%)    Parameters below as of 23 May 2023 06:55  Patient On (Oxygen Delivery Method): room air     I&O's Summary    22 May 2023 07:01  -  23 May 2023 07:00  --------------------------------------------------------  IN: 750 mL / OUT: 2150 mL / NET: -1400 mL    23 May 2023 07:01  -  23 May 2023 13:16  --------------------------------------------------------  IN: 210 mL / OUT: 0 mL / NET: 210 mL        GENERAL:  [ ] No acute distress [ ]Lethargic  [ ]Unarousable  [ ]Verbal  [ ]Non-Verbal [ ]Cachexia    BEHAVIORAL/PSYCH:  [ ]Alert and Oriented x  [ ] Anxiety [ ] Delirium [ ] Agitation [ ] Calm   EYES: [ ] No scleral icterus [ ] Scleral icterus [ ] Closed  ENMT:  [ ]Dry mouth  [ ]No external oral lesions [ ] No external ear or nose lesions  CARDIOVASCULAR:  [ ]Regular [ ]Irregular [ ]Tachy [ ]Not Tachy  [ ][place] [ ] Edema [ ] No edema  PULMONARY:  [ ]Tachypnea  [ ]Audible excessive secretions [ ] No labored breathing [ ] labored breathing  GASTROINTESTINAL: [ ]Soft  [ ]Distended  [ ]Not distended [ ]Non tender [ ]Tender  MUSCULOSKELETAL: [ ]No clubbing [ ] clubbing  [ ] No cyanosis [ ] cyanosis  NEUROLOGIC: [ ]No focal deficits  [ ]Follows commands  [ ]Does not follow commands  [ ]Cognitive impairment  [ ]Dysphagia  [ ]Dysarthria  [ ]Paresis   SKIN: [ ] Jaundiced [ ] Non-jaundiced [ ]Rash [ ]No Rash [ ] Warm [ ] Dry  MISC/LINES: [ ] ET tube [ ] Trach [ ]NGT/OGT [ ]PEG [ ]Loja    CRITICAL CARE:  [ ] Shock Present  [ ]Septic [ ]Cardiogenic [ ]Neurologic [ ]Hypovolemic  [ ]  Vasopressors [ ]  Inotropes   [ ]Respiratory failure present [ ]Mechanical ventilation [ ]Non-invasive ventilatory support [ ]High flow  [ ]Acute  [ ]Chronic [ ]Hypoxic  [ ]Hypercarbic [ ]Other  [ ]Other organ failure     LABS: reviewed by me                        10.3   7.26  )-----------( 296      ( 23 May 2023 04:30 )             36.3   05-23    138  |  96<L>  |  49<H>  ----------------------------<  179<H>  3.9   |  32  |  1.8<H>    Ca    9.0      23 May 2023 04:30  Mg     2.5     05-23    TPro  7.0  /  Alb  3.6  /  TBili  1.3<H>  /  DBili  x   /  AST  32  /  ALT  45<H>  /  AlkPhos  128<H>  05-23  PT/INR - ( 22 May 2023 06:08 )   PT: 18.40 sec;   INR: 1.59 ratio         PTT - ( 22 May 2023 06:08 )  PTT:29.7 sec      RADIOLOGY & ADDITIONAL STUDIES: reviewed by me    EKG: reviewed by me        REFERRALS:   [ ]Chaplaincy  [ ]Hospice  [ ]Child Life  [ ]Social Work  [ ]Case management [ ]Holistic Therapy     Patient discussed with primary medical team MD  Palliative care education provided to patient and/or family    Goals of Care Document:

## 2023-05-23 NOTE — PROGRESS NOTE ADULT - ASSESSMENT
67-year-old male w/ HFrEF 15-20% s/p ICD, decreased RV function, mild MR/TR, DM type I w/ neuropathy and hx hypoglycemia , DL, HTN, CAD, hx MI, s/p CABG, s/p stent (2018), hx in-stent thrombosis while on Plavix, PAD s/p 3 LLE stents, TIAGO (needs CPAP auto-regulating pressure 10-16, patient doesn't use it due to claustrophobia), Psoriasis, R-AKA presenting to ED s/p fall. He states he was lying in bed at home when he fell asleep and fell off the bed. Found to have moises and mild elevation of trop     1. Mechanical Fall   -No CT evidence for acute traumatic injury within the chest, abdomen or pelvis  -No evidence of acute intracranial pathology. Stable exam since 2023.  -No evidence of acute cervical spine fracture or subluxation.  -Vitals wnl   -PT eval:pending      2.Likely Acute systolic CHF complicated by MOISES (cardiorenal) and Troponinemia  * pt denies chest pain but report orthopnea  - Telemetry            - ECG :paced rythm     -CXR: congestion with BNP 6000   - Cont asa/prasugrel   - Cont metoprolol  - has allergy to ACE and Entresto   - c/w Midodrine 10mg TID  - Loja for strict I&O   - Trend trop   (detectable likely demand ischemia in the setting of chf)   - CT chest notable for Moderate to large bilateral pleural effusions, right greater than left. Small to moderate volume abdominopelvic ascites.  - Cardio consult appreaciated >> Lasix, HF consult  - Nephro consult appreaciated  - HF consulted >> Bumex 2mg IV BID with 3% hypertonic saline, pt tolerating the Bumex well, BP under control    3.  DM type 1 complicated by hypoglycemia  episodes of hypoglycemia at home  - Insulin protocol lantus and nutritional insulin was decreased.     4.Hypothyroid   -c/w  synthroid    5. Mild hyperkalemia (resolved)    6.Recent dx of RA   * pt was dx with RA due to upper ext arterial ulcer?   - Cont Methotrexate (last dose was 2023)     # DVT prophylaxis - lovenox   #R-LE non-weight bearing  #carb-consistent/dash diet  #full code (discussed GOC with pt on ), Palliative team consult appreciated    pendin) clinical improvement, fluid overloaded

## 2023-05-23 NOTE — PROGRESS NOTE ADULT - SUBJECTIVE AND OBJECTIVE BOX
Nephrology progress note    Patient was seen and examined, events over the last 24 h noted .    Allergies:  ACE inhibitors (Rash)  Entresto (Swelling)  Atorvastatin Calcium (Unknown)  Bactrim (Unknown)  Plavix (Unknown)    Hospital Medications:   MEDICATIONS  (STANDING):  aspirin enteric coated 81 milliGRAM(s) Oral daily  buMETAnide Injectable 2 milliGRAM(s) IV Push every 12 hours  chlorhexidine 2% Cloths 1 Application(s) Topical daily  dextrose 5% + sodium chloride 0.9%. 1000 milliLiter(s) (50 mL/Hr) IV Continuous <Continuous>  dextrose 5%. 1000 milliLiter(s) (50 mL/Hr) IV Continuous <Continuous>  dextrose 5%. 1000 milliLiter(s) (100 mL/Hr) IV Continuous <Continuous>  dextrose 50% Injectable 25 Gram(s) IV Push once  dextrose 50% Injectable 12.5 Gram(s) IV Push once  dextrose 50% Injectable 25 Gram(s) IV Push once  diazepam    Tablet 1 milliGRAM(s) Oral at bedtime  DULoxetine 30 milliGRAM(s) Oral <User Schedule>  glucagon  Injectable 1 milliGRAM(s) IntraMuscular once  heparin   Injectable 5000 Unit(s) SubCutaneous every 12 hours  hydrocortisone 2.5% Cream 1 Application(s) Topical two times a day  insulin glargine Injectable (LANTUS) 12 Unit(s) SubCutaneous at bedtime  insulin lispro (ADMELOG) corrective regimen sliding scale   SubCutaneous three times a day before meals  lactulose Syrup 10 Gram(s) Oral at bedtime  levothyroxine 25 MICROGram(s) Oral <User Schedule>  levothyroxine 50 MICROGram(s) Oral <User Schedule>  metoprolol succinate ER 25 milliGRAM(s) Oral daily  midodrine. 10 milliGRAM(s) Oral <User Schedule>  pantoprazole    Tablet 40 milliGRAM(s) Oral before breakfast  polyethylene glycol 3350 17 Gram(s) Oral two times a day  potassium chloride    Tablet ER 20 milliEquivalent(s) Oral once  prasugrel 10 milliGRAM(s) Oral daily  senna 2 Tablet(s) Oral at bedtime  sodium chloride 3%. 150 milliLiter(s) (50 mL/Hr) IV Continuous <Continuous>  sodium chloride 3%. 150 milliLiter(s) (50 mL/Hr) IV Continuous <Continuous>        VITALS:  T(F): 97.8 (23 @ 20:21), Max: 97.8 (23 @ 20:21)  HR: 83 (23 @ 20:21)  BP: 97/67 (23 @ 20:21)  RR: 18 (23 @ 20:21)  SpO2: 97% (23 @ 06:55)  Wt(kg): --     @ 07:01  -   @ 07:00  --------------------------------------------------------  IN: 858 mL / OUT: 1200 mL / NET: -342 mL     @ 07:01  -   @ 07:00  --------------------------------------------------------  IN: 750 mL / OUT: 2150 mL / NET: -1400 mL     @ 07:01  -   @ 21:57  --------------------------------------------------------  IN: 450 mL / OUT: 650 mL / NET: -200 mL          PHYSICAL EXAM:  Constitutional: NAD  HEENT: anicteric sclera, oropharynx clear, MMM  Neck: No JVD  Respiratory: CTAB, no wheezes, rales or rhonchi  Cardiovascular: S1, S2, RRR  Gastrointestinal: BS+, soft, NT/ND  Extremities: No cyanosis or clubbing. No peripheral edema  :  No fields.   Skin: No rashes    LABS:      139  |  96<L>  |  47<H>  ----------------------------<  188<H>  3.9   |  30  |  1.7<H>    Ca    8.7      23 May 2023 18:16  Mg     2.0         TPro  7.0  /  Alb  3.6  /  TBili  1.3<H>  /  DBili      /  AST  32  /  ALT  45<H>  /  AlkPhos  128<H>                            10.3   7.26  )-----------( 296      ( 23 May 2023 04:30 )             36.3       Urine Studies:  Urinalysis Basic - ( 20 May 2023 11:00 )    Color: Yellow / Appearance: Slightly Turbid / S.028 / pH:   Gluc:  / Ketone: Negative  / Bili: Negative / Urobili: 3 mg/dL   Blood:  / Protein: 300 mg/dL / Nitrite: Negative   Leuk Esterase: Large / RBC: 143 /HPF /  /HPF   Sq Epi:  / Non Sq Epi:  / Bacteria: Negative      Sodium, Random Urine: <20.0 mmoL/L ( @ 11:00)  Creatinine, Random Urine: 110 mg/dL ( @ 11:00)  Protein/Creatinine Ratio Calculation: 2.3 Ratio ( @ 11:00)  Osmolality, Random Urine: 359 mos/kg ( @ 11:00)  Potassium, Random Urine: 46 mmol/L ( @ 11:00)    RADIOLOGY & ADDITIONAL STUDIES:

## 2023-05-23 NOTE — PROGRESS NOTE ADULT - SUBJECTIVE AND OBJECTIVE BOX
SUBJ: Patient seen and examined. No events overnight. Reports SOB improved      MEDICATIONS  (STANDING):  aspirin enteric coated 81 milliGRAM(s) Oral daily  buMETAnide Injectable 2 milliGRAM(s) IV Push every 12 hours  chlorhexidine 2% Cloths 1 Application(s) Topical daily  dextrose 5% + sodium chloride 0.9%. 1000 milliLiter(s) (50 mL/Hr) IV Continuous <Continuous>  dextrose 5%. 1000 milliLiter(s) (50 mL/Hr) IV Continuous <Continuous>  dextrose 5%. 1000 milliLiter(s) (100 mL/Hr) IV Continuous <Continuous>  dextrose 50% Injectable 25 Gram(s) IV Push once  dextrose 50% Injectable 12.5 Gram(s) IV Push once  dextrose 50% Injectable 25 Gram(s) IV Push once  diazepam    Tablet 1 milliGRAM(s) Oral at bedtime  DULoxetine 30 milliGRAM(s) Oral <User Schedule>  glucagon  Injectable 1 milliGRAM(s) IntraMuscular once  heparin   Injectable 5000 Unit(s) SubCutaneous every 12 hours  hydrocortisone 2.5% Cream 1 Application(s) Topical two times a day  insulin glargine Injectable (LANTUS) 12 Unit(s) SubCutaneous at bedtime  insulin lispro (ADMELOG) corrective regimen sliding scale   SubCutaneous three times a day before meals  lactulose Syrup 10 Gram(s) Oral at bedtime  levothyroxine 25 MICROGram(s) Oral <User Schedule>  levothyroxine 50 MICROGram(s) Oral <User Schedule>  metoprolol succinate ER 25 milliGRAM(s) Oral daily  midodrine. 10 milliGRAM(s) Oral three times a day  pantoprazole    Tablet 40 milliGRAM(s) Oral before breakfast  polyethylene glycol 3350 17 Gram(s) Oral two times a day  prasugrel 10 milliGRAM(s) Oral daily  senna 2 Tablet(s) Oral at bedtime  sodium chloride 3%. 150 milliLiter(s) (50 mL/Hr) IV Continuous <Continuous>  sodium chloride 3%. 150 milliLiter(s) (50 mL/Hr) IV Continuous <Continuous>    MEDICATIONS  (PRN):  acetaminophen     Tablet .. 325 milliGRAM(s) Oral every 4 hours PRN Moderate Pain (4 - 6)  dextrose Oral Gel 15 Gram(s) Oral once PRN Blood Glucose LESS THAN 70 milliGRAM(s)/deciliter  oxyCODONE    IR 10 milliGRAM(s) Oral four times a day PRN Moderate Pain (4 - 6)            Vital Signs Last 24 Hrs  T(C): 35 (23 May 2023 04:31), Max: 35.6 (22 May 2023 13:45)  T(F): 95 (23 May 2023 04:31), Max: 96.1 (22 May 2023 13:45)  HR: 75 (23 May 2023 04:31) (75 - 84)  BP: 108/64 (23 May 2023 04:31) (104/66 - 108/64)  BP(mean): --  RR: 18 (23 May 2023 06:55) (18 - 18)  SpO2: 97% (23 May 2023 06:55) (90% - 97%)    Parameters below as of 23 May 2023 06:55  Patient On (Oxygen Delivery Method): room air         REVIEW OF SYSTEMS:  CONSTITUTIONAL: No fever  NECK: No pain or stiffness  CARDIOVASCULAR: patient denies chest pain, shortness of breath improved .  Respiratory: No cough or wheezing.  NEUROLOGICAL: No focal deficits to report.  GI: no BRBPR, no N,V,diarrhea.    PSYCHIATRY: normal mood and affect  HEENT: no nasal discharge, no ecchymosis  SKIN: no ecchymosis, no breakdown      PHYSICAL EXAM:  GENERAL: NAD  HEAD:  Atraumatic, Normocephalic  NECK: Supple, No JVD  NERVOUS SYSTEM:  Alert & Oriented X3, Good concentration  CHEST/LUNG: Clear to anterior auscultation bilaterally  HEART: Regular rate and rhythm  ABDOMEN: Soft, Nontender, Nondistended;   EXTREMITIES: trace  edema  Psych: Appropriate mood and affect     	  TELEMETRY:      LABS:                        10.3   7.26  )-----------( 296      ( 23 May 2023 04:30 )             36.3     05-23    138  |  96<L>  |  49<H>  ----------------------------<  179<H>  3.9   |  32  |  1.8<H>    Ca    9.0      23 May 2023 04:30  Mg     2.5     05-23    TPro  7.0  /  Alb  3.6  /  TBili  1.3<H>  /  DBili  x   /  AST  32  /  ALT  45<H>  /  AlkPhos  128<H>  05-23        PT/INR - ( 22 May 2023 06:08 )   PT: 18.40 sec;   INR: 1.59 ratio         PTT - ( 22 May 2023 06:08 )  PTT:29.7 sec    I&O's Summary    22 May 2023 07:01  -  23 May 2023 07:00  --------------------------------------------------------  IN: 750 mL / OUT: 2150 mL / NET: -1400 mL    23 May 2023 07:01  -  23 May 2023 10:27  --------------------------------------------------------  IN: 210 mL / OUT: 0 mL / NET: 210 mL      BNP  RADIOLOGY & ADDITIONAL STUDIES:    IMPRESSION AND PLAN:  HFrEF  MOISES  Pleural effusion    - Management as per primary team  - I/O and renal function  - Continue diureses  - Will follow as needed

## 2023-05-23 NOTE — PROGRESS NOTE ADULT - SUBJECTIVE AND OBJECTIVE BOX
CHIEF COMPLAINT:    Patient is a 67y old  Male who presents with a chief complaint of Mechanical fall     INTERVAL HPI/OVERNIGHT EVENTS:    Patient seen and examined at bedside. No acute overnight events occurred.    ROS: Denies pain, SOB. All other systems are negative.    Medications:  Standing  aspirin enteric coated 81 milliGRAM(s) Oral daily  buMETAnide Injectable 2 milliGRAM(s) IV Push every 12 hours  chlorhexidine 2% Cloths 1 Application(s) Topical daily  dextrose 5% + sodium chloride 0.9%. 1000 milliLiter(s) IV Continuous <Continuous>  dextrose 5%. 1000 milliLiter(s) IV Continuous <Continuous>  dextrose 5%. 1000 milliLiter(s) IV Continuous <Continuous>  dextrose 50% Injectable 25 Gram(s) IV Push once  dextrose 50% Injectable 12.5 Gram(s) IV Push once  dextrose 50% Injectable 25 Gram(s) IV Push once  diazepam    Tablet 1 milliGRAM(s) Oral at bedtime  DULoxetine 30 milliGRAM(s) Oral <User Schedule>  glucagon  Injectable 1 milliGRAM(s) IntraMuscular once  heparin   Injectable 5000 Unit(s) SubCutaneous every 12 hours  hydrocortisone 2.5% Cream 1 Application(s) Topical two times a day  insulin glargine Injectable (LANTUS) 12 Unit(s) SubCutaneous at bedtime  insulin lispro (ADMELOG) corrective regimen sliding scale   SubCutaneous three times a day before meals  lactulose Syrup 10 Gram(s) Oral at bedtime  levothyroxine 25 MICROGram(s) Oral <User Schedule>  levothyroxine 50 MICROGram(s) Oral <User Schedule>  metoprolol succinate ER 25 milliGRAM(s) Oral daily  midodrine. 10 milliGRAM(s) Oral three times a day  pantoprazole    Tablet 40 milliGRAM(s) Oral before breakfast  polyethylene glycol 3350 17 Gram(s) Oral two times a day  prasugrel 10 milliGRAM(s) Oral daily  senna 2 Tablet(s) Oral at bedtime  sodium chloride 3%. 150 milliLiter(s) IV Continuous <Continuous>  sodium chloride 3%. 150 milliLiter(s) IV Continuous <Continuous>    PRN Meds  acetaminophen     Tablet .. 325 milliGRAM(s) Oral every 4 hours PRN  dextrose Oral Gel 15 Gram(s) Oral once PRN  oxyCODONE    IR 10 milliGRAM(s) Oral four times a day PRN        Vital Signs:    T(F): 96.8 (23 @ 13:28), Max: 96.8 (23 @ 13:28)  HR: 80 (23 @ 13:28) (75 - 82)  BP: 116/74 (23 @ 13:28) (104/66 - 116/74)  RR: 18 (23 @ 13:28) (18 - 18)  SpO2: 97% (23 @ 06:55) (90% - 97%)  I&O's Summary    22 May 2023 07:01  -  23 May 2023 07:00  --------------------------------------------------------  IN: 750 mL / OUT: 2150 mL / NET: -1400 mL    23 May 2023 07:01  -  23 May 2023 17:42  --------------------------------------------------------  IN: 450 mL / OUT: 650 mL / NET: -200 mL      Daily     Daily Weight in k (23 May 2023 04:31)  CAPILLARY BLOOD GLUCOSE      POCT Blood Glucose.: 193 mg/dL (23 May 2023 16:17)  POCT Blood Glucose.: 210 mg/dL (23 May 2023 11:26)  POCT Blood Glucose.: 189 mg/dL (23 May 2023 07:31)  POCT Blood Glucose.: 213 mg/dL (22 May 2023 21:20)      PHYSICAL EXAM:  GENERAL:  NAD  SKIN: No rashes or lesions  HEENT: Atraumatic. Normocephalic. Anicteric  NECK:  No JVD.   PULMONARY: Clear to ausculation bilaterally. No wheezing. No rales  CVS: Normal S1, S2. Regular rate and rhythm. No murmurs.  ABDOMEN/GI: Soft, Nontender, Nondistended; Bowel sounds are present + fluid wave, flank bulging  EXTREMITIES:  right AKA  NEUROLOGIC:  No motor deficit.  PSYCH: Alert & oriented x 3, normal affect      LABS:                        10.3   7.26  )-----------( 296      ( 23 May 2023 04:30 )             36.3     05    138  |  96<L>  |  49<H>  ----------------------------<  179<H>  3.9   |  32  |  1.8<H>    Ca    9.0      23 May 2023 04:30  Mg     2.5         TPro  7.0  /  Alb  3.6  /  TBili  1.3<H>  /  DBili  x   /  AST  32  /  ALT  45<H>  /  AlkPhos  128<H>      PT/INR - ( 22 May 2023 06:08 )   PT: 18.40 sec;   INR: 1.59 ratio         PTT - ( 22 May 2023 06:08 )  PTT:29.7 sec          RADIOLOGY & ADDITIONAL TESTS:  Imaging or report Personally Reviewed:  [ ] YES  [ ] NO -->no new images    Telemetry reviewed independently - NSR, no acute events  EKG reviewed independently -->no new EKGs    Consultant(s) Notes Reviewed:  [ ] YES  [ ] NO  Care Discussed with Consultants/Other Providers [ ] YES  [ ] NO    Case discussed with resident  Care discussed with pt

## 2023-05-23 NOTE — PROGRESS NOTE ADULT - PROBLEM SELECTOR PLAN 3
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 He wants to be a full code w [facility].

## 2023-05-23 NOTE — PROGRESS NOTE ADULT - ASSESSMENT
66 yo M PMHx chronic HFrEF 15-20% s/p ICD, decreased RV function, mild MR/TR, DM type I w/ neuropathy and hx hypoglycemia, DLD, HTN, CAD, hx MI, s/p CABG, s/p stent (2018), hx in-stent thrombosis while on Plavix, PAD s/p 3 LLE stents, TIAGO (needs CPAP auto-regulating pressure 10-16, patient doesn't use it due to claustrophobia), Psoriasis, R-AKA presenting to ED s/p fall. He states he was lying in bed at home when he fell asleep and fell off the bed. Found to have mosies and mild elevation of trop     Mechanical Fall   -No CT evidence for acute traumatic injury within the chest, abdomen or pelvis  -No evidence of acute intracranial pathology.   -No evidence of acute cervical spine fracture or subluxation.  -PT eval     Acute on chronic HFrEF  CRS  Type 2 NSTEMI due to demand ischemia in setting of MOISES  Moderate to large bilateral pleural effusions, right greater than left.   Small to moderate volume abdominopelvic ascites.  - c/w telemetry  If BP decreases will need CCU evaluation and upgrade.  - case d/w cardiology attending today. DIuretics increased. HF team recommended bumex and hypertonic saline  - c/w aspirin, prasugrel, metoprolol  - had "anaphlyatic reaction" to ACE-I (presumably angioedema?) and has allergy to ENtresto  - effusion and ascites likely due to decompensated CHF. Paracentesis offered to pt to r/o SBP but he declined. Agreeable to abdominal US to further assess amt   - EKG shows atrial paced rhythm  - BNP 9,132    MOISES on CKD III  - as per pt he has issues with urinary retention from time to time  - RBUS showedMild left hydronephrosis/dilated pelvis, unchanged. Left lower pole 0.5   cm nonobstructing calculus.-->urology eval when cardiac stable  - SCr downtrending, suggests CRS  - pt has fields in place for retention/I&O    Transaminitis  - has mildly elevated LFTs including INR  - likely due to congestion  - repeat INR  - RUQ sono appreciated, no apparent hepatobiliary issues  - check ggt      DM type 1 complicated by hypoglycemia  episodes of hypoglycemia at home  - Insulin protocol lantus and nutritional insulin was decreased.     Hypothyroid   -c/w  synthroid    Mild hyperkalemia  - resolved    Recent dx of RA   * pt was dx with RA due to upper ext arterial ulcer?   - Cont Methotrexate (last dose was 05/18/2023)     Psoriasis  - pt states this is a wrong diagnosis  - nails consistent with PsA.    # DVT prophylaxis - lovenox   #R-LE non-weight bearing  #carb-consistent/dash diet  #full code    #Progress Note Handoff:  Pending (specify):  Clinial improvement  Family discussion: Spoke with pt regarding above  Disposition: Home___/SNF___/Other________/Unknown at this time____x____

## 2023-05-23 NOTE — PROGRESS NOTE ADULT - PROBLEM SELECTOR PLAN 5
H/O use of Oxycodone 5mg IR PRN, current order for Oxycodone 10mg PO QID PRN for pain H/O use of Oxycodone 5mg IR PRN, current order for Oxycodone 10mg PO QID PRN for pain  DULoxetine 30 milliGRAM(s) Oral (and for pain?)

## 2023-05-23 NOTE — PROGRESS NOTE ADULT - ASSESSMENT
Assessment: 67-year-old male w/ HFrEF 15-20% s/p ICD, DM type I, DL, HTN, CAD, hx MI, s/p CABG, s/p stent (2018), hx in-stent thrombosis while on Plavix, PAD s/p 3 LLE stents, TIAGO non-compliant w/ CPAP, R-AKA presenting to ED s/p fall (mechanical). Patient found to be fluid overloaded with MOISES, heart failure consulted for further management of ADHF.      Plan:  Patient remains fluid overloaded on exam 2/2 acute decompensated heart failure  POCUS exam: IVC remains dilated   Continue Bumex 2mg IVP BID  Continue 3% Hypertonic Saline 150ml BID (If infused throughout a central line, run over one hour, if a peripheral line is to be used run over 3 hours) *Start infusion, after 30 minutes- give Bumex IVP then continue rest of infusion*  Continue low dose BB  Get BMP twice daily with magnesium   K 3.9- replete  Maintain potassium >4.0, Mg >2.2  Wean midodrine as tolerated- goal SBP > 85 mmHg  Strict intake and output  Daily weight   Physical therapy consult  Iron sat 3%- follow up ferritin level  If iron deficient (ferritin < 100, or ferritin 100-300 and iron saturation <20%), would give IV iron sucrose as 200 mg iv daily over one hour for 5 days if no infection present  Plan discussed with patient and daughter present at bedside   Will continue to follow Assessment: 67-year-old male w/ HFrEF 15-20% s/p ICD, DM type I, DL, HTN, CAD, hx MI, s/p CABG, s/p stent (2018), hx in-stent thrombosis while on Plavix, PAD s/p 3 LLE stents, TIAGO non-compliant w/ CPAP, R-AKA presenting to ED s/p fall (mechanical). Patient found to be fluid overloaded with MOISES, heart failure consulted for further management of ADHF.      Plan:  Patient remains fluid overloaded on exam 2/2 acute decompensated heart failure  POCUS exam: IVC remains dilated   Continue Bumex 2mg IVP BID  Continue 3% Hypertonic Saline 150ml BID (If infused throughout a central line, run over one hour, if a peripheral line is to be used run over 3 hours) *Start infusion, after 30 minutes- give Bumex IVP then continue rest of infusion*  Continue low dose BB  Lactate 2.1- repeat with AM labs  Get BMP twice daily with magnesium   K 3.9- replete  Maintain potassium >4.0, Mg >2.2  Wean midodrine as tolerated- goal SBP > 85 mmHg  Strict intake and output  Daily weight   Physical therapy consult  Iron sat 3%- follow up ferritin level  If iron deficient (ferritin < 100, or ferritin 100-300 and iron saturation <20%), would give IV iron sucrose as 200 mg iv daily over one hour for 5 days if no infection present  Plan discussed with patient and daughter present at bedside   Will continue to follow Assessment: 67-year-old male w/ HFrEF 15-20% s/p ICD, DM type I, DL, HTN, CAD, hx MI, s/p CABG, s/p stent (2018), hx in-stent thrombosis while on Plavix, PAD s/p 3 LLE stents, TIAGO non-compliant w/ CPAP, R-AKA presenting to ED s/p fall (mechanical). Patient found to be fluid overloaded with MOISES, heart failure consulted for further management of ADHF.      Plan:  Patient remains fluid overloaded on exam 2/2 acute decompensated heart failure  POCUS exam: IVC remains dilated   Continue Bumex 2mg IVP BID  Continue 3% Hypertonic Saline 150ml BID (If infused throughout a central line, run over one hour, if a peripheral line is to be used run over 3 hours) *Start infusion, after 30 minutes- give Bumex IVP then continue rest of infusion*  Continue low dose BB  Consider reinstating home spironolactone   Lactate 2.1- repeat tomorrow with AM labs  Get BMP twice daily with magnesium   K 3.9- replete  Maintain potassium >4.0, Mg >2.2  Wean midodrine as tolerated- goal SBP > 85 mmHg  Strict intake and output  Daily weight   Physical therapy consult  Iron sat 3%- follow up ferritin level  If iron deficient (ferritin < 100, or ferritin 100-300 and iron saturation <20%), would give IV iron sucrose as 200 mg iv daily over one hour for 5 days if no infection present  Plan discussed with patient and daughter present at bedside   Will continue to follow

## 2023-05-23 NOTE — PROGRESS NOTE ADULT - ASSESSMENT
66 yo male, PMHx of HFrEF 15-20% s/p ICD, decreased RV function, mild MR/TR, DM type I, DL, HTN, CAD, hx MI, s/p CABG, s/p stent (2018), PAD s/p 3 LLE stents, TIAGO, Psorias, presents s/p fall  called for MOISES / stage 3b ckd  # patient hypotensive / started on midodrine BP improved/ last echo in 2/23 EF in the 20 % , grade 3 DD  # b/l pleural effusions / pulmonary evaluation ? tap   #u sodium < 20/ in favor of decrease effective renal blood flow / likely CRS   # creat improving, consider switching lasix to po [place] , patient on RA   #repeat K/L/ IF weak ig G kappa in the past   # CT : left renal pelvis dilatation with ? UPJ obstruction / sono noted needs  evaluation  # last OBED positive , repeat check DNA repeat c3c4   # no acute indication for RRT  will follow

## 2023-05-23 NOTE — PROGRESS NOTE ADULT - ASSESSMENT
67M with PMH of HRrEF (15-20% EF) s/p AICD, DM1, HLD, HTN CAD s/p MI s/p CABG, PAD s/p 3 LLE stents, TIAGO, psoariasis, and R AKA here after mechanical fall, without acute trauma noted. Course c/b acute-on-chronic HFrEF and CRS, with type 2 NSTEMI due to demand ischemia, on diuresis. Also with mild transaminitis, likely from congestion. Palliative care consulted for GOC. Patient is full code. 67M with PMH of HRrEF (15-20% EF) s/p AICD, DM1, HLD, HTN CAD s/p MI s/p CABG, PAD s/p 3 LLE stents, TIAGO, psoariasis, and R AKA here after mechanical fall, without acute trauma noted. Course c/b acute-on-chronic HFrEF and CRS, with type 2 NSTEMI due to demand ischemia, on diuresis. Also with mild transaminitis, likely from congestion. Palliative care consulted for GOC. Patient is full code.    Patient appears confused, delirious? Spoke to attending MD and reported finding.

## 2023-05-24 NOTE — PROGRESS NOTE ADULT - PROBLEM SELECTOR PLAN 5
H/O use of Oxycodone 5mg IR PRN, current order for Oxycodone 10mg PO QID PRN for pain  DULoxetine 30 milliGRAM(s) Oral (and for pain?).

## 2023-05-24 NOTE — PROGRESS NOTE ADULT - SUBJECTIVE AND OBJECTIVE BOX
SUBJ: Patient seen and examined. No events overnight.       MEDICATIONS  (STANDING):  aspirin enteric coated 81 milliGRAM(s) Oral daily  buMETAnide Injectable 2 milliGRAM(s) IV Push every 12 hours  chlorhexidine 2% Cloths 1 Application(s) Topical daily  dextrose 5% + sodium chloride 0.9%. 1000 milliLiter(s) (50 mL/Hr) IV Continuous <Continuous>  dextrose 5%. 1000 milliLiter(s) (50 mL/Hr) IV Continuous <Continuous>  dextrose 5%. 1000 milliLiter(s) (100 mL/Hr) IV Continuous <Continuous>  dextrose 50% Injectable 25 Gram(s) IV Push once  dextrose 50% Injectable 12.5 Gram(s) IV Push once  dextrose 50% Injectable 25 Gram(s) IV Push once  diazepam    Tablet 1 milliGRAM(s) Oral at bedtime  DULoxetine 30 milliGRAM(s) Oral <User Schedule>  glucagon  Injectable 1 milliGRAM(s) IntraMuscular once  heparin   Injectable 5000 Unit(s) SubCutaneous every 12 hours  hydrocortisone 2.5% Cream 1 Application(s) Topical two times a day  insulin glargine Injectable (LANTUS) 12 Unit(s) SubCutaneous at bedtime  insulin lispro (ADMELOG) corrective regimen sliding scale   SubCutaneous three times a day before meals  iron sucrose IVPB 200 milliGRAM(s) IV Intermittent every 24 hours  lactulose Syrup 10 Gram(s) Oral at bedtime  levothyroxine 25 MICROGram(s) Oral <User Schedule>  levothyroxine 50 MICROGram(s) Oral <User Schedule>  magnesium sulfate  IVPB 2 Gram(s) IV Intermittent once  methotrexate (Non - oncologic) 10 milliGRAM(s) Oral every week  metoprolol succinate ER 25 milliGRAM(s) Oral daily  midodrine. 10 milliGRAM(s) Oral <User Schedule>  pantoprazole    Tablet 40 milliGRAM(s) Oral before breakfast  polyethylene glycol 3350 17 Gram(s) Oral two times a day  potassium chloride    Tablet ER 20 milliEquivalent(s) Oral once  prasugrel 10 milliGRAM(s) Oral daily  senna 2 Tablet(s) Oral at bedtime  sodium chloride 3%. 150 milliLiter(s) (50 mL/Hr) IV Continuous <Continuous>  sodium chloride 3%. 150 milliLiter(s) (50 mL/Hr) IV Continuous <Continuous>  sodium chloride 3%. 150 milliLiter(s) (50 mL/Hr) IV Continuous <Continuous>    MEDICATIONS  (PRN):  acetaminophen     Tablet .. 325 milliGRAM(s) Oral every 4 hours PRN Moderate Pain (4 - 6)  dextrose Oral Gel 15 Gram(s) Oral once PRN Blood Glucose LESS THAN 70 milliGRAM(s)/deciliter  oxyCODONE    IR 10 milliGRAM(s) Oral four times a day PRN Moderate Pain (4 - 6)            Vital Signs Last 24 Hrs  T(C): 35.4 (24 May 2023 05:39), Max: 36.6 (23 May 2023 20:21)  T(F): 95.7 (24 May 2023 05:39), Max: 97.8 (23 May 2023 20:21)  HR: 81 (24 May 2023 05:39) (80 - 83)  BP: 105/74 (24 May 2023 05:39) (97/67 - 116/74)  BP(mean): --  RR: 18 (24 May 2023 05:39) (18 - 18)  SpO2: --         REVIEW OF SYSTEMS:  CONSTITUTIONAL: No fever  NECK: No pain or stiffness  CARDIOVASCULAR: patient denies chest pain, shortness of breath or palpitations .  Respiratory: No cough or wheezing.  NEUROLOGICAL: No focal deficits to report.  GI: no BRBPR, no N,V,diarrhea.    PSYCHIATRY: normal mood and affect  HEENT: no nasal discharge, no ecchymosis        PHYSICAL EXAM:  GENERAL: NAD  HEAD:  Atraumatic, Normocephalic  NECK: Supple, No JVD  NERVOUS SYSTEM:  Alert & Oriented X3, Good concentration  CHEST/LUNG: Clear to anterior auscultation bilaterally  HEART: Regular rate and rhythm  ABDOMEN: Soft, Nontender, Nondistended;   EXTREMITIES:  trace  edema  Psych: Appropriate mood and affect     	  TELEMETRY:      LABS:                        10.3   7.73  )-----------( 283      ( 24 May 2023 07:10 )             36.2     05-24    140  |  97<L>  |  46<H>  ----------------------------<  77  3.9   |  31  |  1.7<H>    Ca    8.6      24 May 2023 07:10  Mg     2.4     05-24    TPro  6.6  /  Alb  3.3<L>  /  TBili  1.3<H>  /  DBili  x   /  AST  27  /  ALT  37  /  AlkPhos  122<H>  05-24            I&O's Summary    23 May 2023 07:01  -  24 May 2023 07:00  --------------------------------------------------------  IN: 530 mL / OUT: 2350 mL / NET: -1820 mL    24 May 2023 07:01  -  24 May 2023 10:06  --------------------------------------------------------  IN: 115 mL / OUT: 0 mL / NET: 115 mL      BNP  RADIOLOGY & ADDITIONAL STUDIES:    IMPRESSION AND PLAN:    Renal function improving with diuresis  BP stable  - Management as per primary team  - Appreciate HF and Renal input   - Continue diureses and f/u on I/O and renal function  - Will follow as needed

## 2023-05-24 NOTE — PROGRESS NOTE ADULT - SUBJECTIVE AND OBJECTIVE BOX
FLOWER MARISCAL  67y  Male      Patient is a 67y old  Male who presents with a chief complaint of Mechanical fall (24 May 2023 13:37)      INTERVAL HPI/OVERNIGHT EVENTS:    Vital Signs Last 24 Hrs  T(C): 36.2 (24 May 2023 13:51), Max: 36.2 (24 May 2023 13:51)  T(F): 97.1 (24 May 2023 13:51), Max: 97.1 (24 May 2023 13:51)  HR: 80 (24 May 2023 15:12) (80 - 111)  BP: 113/62 (24 May 2023 13:51) (105/74 - 113/62)  BP(mean): --  RR: 18 (24 May 2023 13:51) (18 - 18)  SpO2: --          05-23-23 @ 07:01  -  05-24-23 @ 07:00  --------------------------------------------------------  IN: 530 mL / OUT: 2350 mL / NET: -1820 mL    05-24-23 @ 07:01  -  05-24-23 @ 20:44  --------------------------------------------------------  IN: 225 mL / OUT: 2100 mL / NET: -1875 mL            Consultant(s) Notes Reviewed:  [x ] YES  [ ] NO          MEDICATIONS  (STANDING):  aspirin enteric coated 81 milliGRAM(s) Oral daily  buMETAnide Injectable 2 milliGRAM(s) IV Push every 12 hours  chlorhexidine 2% Cloths 1 Application(s) Topical daily  dextrose 5% + sodium chloride 0.9%. 1000 milliLiter(s) (50 mL/Hr) IV Continuous <Continuous>  dextrose 5%. 1000 milliLiter(s) (50 mL/Hr) IV Continuous <Continuous>  dextrose 5%. 1000 milliLiter(s) (100 mL/Hr) IV Continuous <Continuous>  dextrose 50% Injectable 25 Gram(s) IV Push once  dextrose 50% Injectable 12.5 Gram(s) IV Push once  dextrose 50% Injectable 25 Gram(s) IV Push once  diazepam    Tablet 1 milliGRAM(s) Oral at bedtime  DULoxetine 30 milliGRAM(s) Oral <User Schedule>  glucagon  Injectable 1 milliGRAM(s) IntraMuscular once  heparin   Injectable 5000 Unit(s) SubCutaneous every 12 hours  hydrocortisone 2.5% Cream 1 Application(s) Topical two times a day  insulin glargine Injectable (LANTUS) 12 Unit(s) SubCutaneous at bedtime  insulin lispro (ADMELOG) corrective regimen sliding scale   SubCutaneous three times a day before meals  iron sucrose IVPB 200 milliGRAM(s) IV Intermittent every 24 hours  lactulose Syrup 10 Gram(s) Oral at bedtime  levothyroxine 25 MICROGram(s) Oral <User Schedule>  levothyroxine 50 MICROGram(s) Oral <User Schedule>  methotrexate (Non - oncologic) 10 milliGRAM(s) Oral every week  metoprolol succinate ER 25 milliGRAM(s) Oral daily  midodrine. 10 milliGRAM(s) Oral <User Schedule>  pantoprazole    Tablet 40 milliGRAM(s) Oral before breakfast  polyethylene glycol 3350 17 Gram(s) Oral two times a day  prasugrel 10 milliGRAM(s) Oral daily  senna 2 Tablet(s) Oral at bedtime  sodium chloride 3%. 150 milliLiter(s) (50 mL/Hr) IV Continuous <Continuous>  sodium chloride 3%. 150 milliLiter(s) (50 mL/Hr) IV Continuous <Continuous>    MEDICATIONS  (PRN):  acetaminophen     Tablet .. 325 milliGRAM(s) Oral every 4 hours PRN Moderate Pain (4 - 6)  dextrose Oral Gel 15 Gram(s) Oral once PRN Blood Glucose LESS THAN 70 milliGRAM(s)/deciliter  oxyCODONE    IR 10 milliGRAM(s) Oral four times a day PRN Moderate Pain (4 - 6)      LABS                          10.3   7.73  )-----------( 283      ( 24 May 2023 07:10 )             36.2     05-24    136  |  95<L>  |  40<H>  ----------------------------<  86  3.9   |  32  |  1.5    Ca    8.5      24 May 2023 16:00  Mg     2.1     05-24    TPro  6.6  /  Alb  3.3<L>  /  TBili  1.3<H>  /  DBili  x   /  AST  27  /  ALT  37  /  AlkPhos  122<H>  05-24          Lactate Trend  05-24 @ 16:00 Lactate:1.1   05-24 @ 04:30 Lactate:2.5   05-22 @ 18:22 Lactate:2.1         CAPILLARY BLOOD GLUCOSE      POCT Blood Glucose.: 108 mg/dL (24 May 2023 16:44)        RADIOLOGY & ADDITIONAL TESTS:    Imaging Personally Reviewed:  [ ] YES  [ ] NO    HEALTH ISSUES - PROBLEM Dx:  Acute on chronic HFrEF (heart failure with reduced ejection fraction)    Cardiorenal syndrome    Transaminitis    Advanced care planning/counseling discussion    Encounter for palliative care    S/P AKA (above knee amputation), right    Anxiety    Altered mental status            PHYSICAL EXAM:  GENERAL: NAD, well-developed.  HEAD:  Atraumatic, Normocephalic.  EYES: EOMI, PERRLA, conjunctiva and sclera clear.  NECK: Supple, No JVD.  CHEST/LUNG: Clear to auscultation bilaterally; No wheeze.  HEART: Regular rate and rhythm; S1 S2.   ABDOMEN: Soft, Nontender, Nondistended; Bowel sounds present.  EXTREMITIES: right above knee amputation, small LLE healing ulcers.   PSYCH: AAOx3.  NEUROLOGY: non-focal.  SKIN: No rashes or lesions.

## 2023-05-24 NOTE — PROGRESS NOTE ADULT - ASSESSMENT
67M with PMH of HRrEF (15-20% EF) s/p AICD, DM1, HLD, HTN CAD s/p MI s/p CABG, PAD s/p 3 LLE stents, TIAGO, psoariasis, and R AKA here after mechanical fall, without acute trauma noted. Course c/b acute-on-chronic HFrEF and CRS, with type 2 NSTEMI due to demand ischemia, on diuresis. Also with mild transaminitis, likely from congestion. Palliative care consulted for GOC. Patient is full code.    Pt somewhat confused s/p ECHO. Pt complaining of pain, but affect is strange. Crying one minute and saying he is going to die. The getting angry and lashing out at his wife. Wife providing supportive care.

## 2023-05-24 NOTE — PROGRESS NOTE ADULT - SUBJECTIVE AND OBJECTIVE BOX
Nephrology Progress Note    FLOWER MARISCAL  [id number]  67y  Male    S:  Patient is seen and examined, events over the last 24h noted.    O:  Allergies:  ACE inhibitors (Rash)  Entresto (Swelling)  Atorvastatin Calcium (Unknown)  Bactrim (Unknown)  Plavix (Unknown)    Hospital Medications:   MEDICATIONS  (STANDING):  aspirin enteric coated 81 milliGRAM(s) Oral daily  buMETAnide Injectable 2 milliGRAM(s) IV Push every 12 hours  diazepam    Tablet 1 milliGRAM(s) Oral at bedtime  DULoxetine 30 milliGRAM(s) Oral <User Schedule>  heparin   Injectable 5000 Unit(s) SubCutaneous every 12 hours  hydrocortisone 2.5% Cream 1 Application(s) Topical two times a day  insulin glargine Injectable (LANTUS) 12 Unit(s) SubCutaneous at bedtime  insulin lispro (ADMELOG) corrective regimen sliding scale   SubCutaneous three times a day before meals  iron sucrose IVPB 200 milliGRAM(s) IV Intermittent every 24 hours  lactulose Syrup 10 Gram(s) Oral at bedtime  levothyroxine 25 MICROGram(s) Oral <User Schedule>  levothyroxine 50 MICROGram(s) Oral <User Schedule>  magnesium sulfate  IVPB 2 Gram(s) IV Intermittent once  methotrexate (Non - oncologic) 10 milliGRAM(s) Oral every week  metoprolol succinate ER 25 milliGRAM(s) Oral daily  midodrine. 10 milliGRAM(s) Oral <User Schedule>  pantoprazole    Tablet 40 milliGRAM(s) Oral before breakfast  polyethylene glycol 3350 17 Gram(s) Oral two times a day  potassium chloride    Tablet ER 20 milliEquivalent(s) Oral once  prasugrel 10 milliGRAM(s) Oral daily  senna 2 Tablet(s) Oral at bedtime  sodium chloride 3%. 150 milliLiter(s) (50 mL/Hr) IV Continuous <Continuous>    MEDICATIONS  (PRN):  acetaminophen     Tablet .. 325 milliGRAM(s) Oral every 4 hours PRN Moderate Pain (4 - 6)  dextrose Oral Gel 15 Gram(s) Oral once PRN Blood Glucose LESS THAN 70 milliGRAM(s)/deciliter  oxyCODONE    IR 10 milliGRAM(s) Oral four times a day PRN Moderate Pain (4 - 6)    Home Medications:  aspirin 81 mg oral delayed release tablet: 1 tab(s) orally once a day (2023 02:35)  diazePAM 2 mg oral tablet: 0.5 tab(s) orally once a day (at bedtime) (2023 02:35)  DULoxetine 30 mg oral delayed release capsule: 1 cap(s) orally 3 times a day (2023 02:35)  insulin glargine 100 units/mL subcutaneous solution: 25 unit(s) subcutaneous once a day (at bedtime) (2023 02:35)  insulin glargine 100 units/mL subcutaneous solution: 40 microcurie subcutaneous once a day (2023 02:35)  insulin lispro 100 units/mL injectable solution: if your finger stick is 150-199 please inject 2 units  if your finger stick is 200-250 please inject 4 units  if your finger stick is 251-300 please inject 6 units  if your finger stick is 301-350 please inject 8 units  if your finger stick is more than 350 please call your physician    (2023 02:35)  levothyroxine 25 mcg (0.025 mg) oral tablet: 1 tab(s) orally 6 times a week (2023 02:35)  levothyroxine 50 mcg (0.05 mg) oral tablet: 1 tab(s) orally once a week (2023 02:35)  metoprolol succinate 25 mg oral tablet, extended release: 1 tab(s) orally once a day (2023 02:35)  Multiple Vitamins oral tablet: 1 tab(s) orally once a day (2023 02:35)  pantoprazole 40 mg oral delayed release tablet: 1 tab(s) orally once a day (before a meal) (2023 02:35)  prasugrel 10 mg oral tablet: 1 tab(s) orally once a day (2023 02:35)  rosuvastatin 40 mg oral tablet: 1 tab(s) orally once a day (2023 02:35)  spironolactone 25 mg oral tablet: 1 tab(s) orally once a day (2023 02:35)      VITALS:  Daily Weight in k (24 May 2023 05:39)  T(F): 95.7 (23 @ 05:39), Max: 97.8 (23 @ 20:21)  HR: 81 (23 @ 05:39)  BP: 105/74 (23 @ 05:39)  RR: 18 (23 @ 05:39)  SpO2: --  Wt(kg): --  I&O's Detail    23 May 2023 07:01  -  24 May 2023 07:00  --------------------------------------------------------  IN:    Oral Fluid: 530 mL  Total IN: 530 mL    OUT:    Indwelling Catheter - Urethral (mL): 400 mL    Voided (mL): 1950 mL  Total OUT: 2350 mL    Total NET: -1820 mL      24 May 2023 07:  -  24 May 2023 12:12  --------------------------------------------------------  IN:    Oral Fluid: 115 mL  Total IN: 115 mL    OUT:    Indwelling Catheter - Urethral (mL): 1600 mL  Total OUT: 1600 mL    Total NET: -1485 mL        I&O's Summary    23 May 2023 07:  -  24 May 2023 07:00  --------------------------------------------------------  IN: 530 mL / OUT: 2350 mL / NET: -1820 mL    24 May 2023 07:  -  24 May 2023 12:12  --------------------------------------------------------  IN: 115 mL / OUT: 1600 mL / NET: -1485 mL          PHYSICAL EXAM:  Gen: NAD  Resp: b/l breath sounds  Card: S1/S2  Abd: soft  Extremities: min edema      LABS:        140  |  97<L>  |  46<H>  ----------------------------<  77  3.9   |  31  |  1.7<H>    Ca    8.6      24 May 2023 07:10  Mg     2.4         TPro  6.6  /  Alb  3.3<L>  /  TBili  1.3<H>  /  DBili      /  AST  27  /  ALT  37  /  AlkPhos  122<H>      Phosphorus Level, Serum: 4.5 mg/dL (23 @ 17:04)  Phosphorus Level, Serum: 2.9 mg/dL (23 @ 17:06)    Vitamin D, 25-Hydroxy: 36 ng/mL (21 @ 06:53)                          10.3   7.73  )-----------( 283      ( 24 May 2023 07:10 )             36.2     Mean Cell Volume: 69.9 fL (23 @ 07:10)    Iron Total, Serum: 13 ug/dL (23 @ 04:30)  % Saturation, Iron: 3 % (23 @ 04:30)      Urine Studies:  Urinalysis Basic - ( 20 May 2023 11:00 )    Color: Yellow / Appearance: Slightly Turbid / S.028 / pH:   Gluc:  / Ketone: Negative  / Bili: Negative / Urobili: 3 mg/dL   Blood:  / Protein: 300 mg/dL / Nitrite: Negative   Leuk Esterase: Large / RBC: 143 /HPF /  /HPF   Sq Epi:  / Non Sq Epi:  / Bacteria: Negative        Urea Nitrogen,  Random Urine: 350 mg/dL (23 @ 11:00)    Sodium, Random Urine: <20.0 mmoL/L ( @ 11:00)  Creatinine, Random Urine: 110 mg/dL ( @ 11:00)    Creatinine trend:  Creatinine, Serum: 1.7 mg/dL (23 @ 07:10)  Creatinine, Serum: 1.7 mg/dL (23 @ 18:16)  Creatinine, Serum: 1.8 mg/dL (23 @ 04:30)  Creatinine, Serum: 2.0 mg/dL (23 @ 06:08)  Creatinine, Serum: 2.2 mg/dL (23 @ 05:26)  Creatinine, Serum: 2.6 mg/dL (23 @ 07:01)    OBED Pattern: Speckled (23 @ 06:08)  OBED Pattern: Speckled (23 @ 17:04)  Immunofixation, Urine: Reference Range: None Detected (21 @ 20:33)      US Kidney and Bladder:   ACC: [id number] EXAM:  US KIDNEYS AND BLADDER   ORDERED BY: SATURNINO ROMERO     PROCEDURE DATE:  2023          INTERPRETATION:  CLINICAL INFORMATION: Acute kidney injury    Correlation is made with CT abdomen and pelvis May 18, 2023    TECHNIQUE: Sonography of the kidneys and bladder.    FINDINGS:    Right kidney: 12.0 cm. No hydronephrosis or calculi.    Left kidney:  10.0 cm. Mild left hydronephrosis/dilated pelvis. Left   lower pole 0.5 cm nonobstructing calculus.    Urinary bladder: Urinary bladder volume of 232 cc. Patient unable to void   during this examination. Circumferential wall thickening. No intraluminal   debris or calculi. Bilateral ureteral jets are visualized.    Partially imaged abdominal pelvic ascites and bilateral effusions.    IMPRESSION:    Mild left hydronephrosis/dilated pelvis, unchanged. Left lower pole 0.5   cm nonobstructing calculus.    Partially imaged abdominopelvic ascites and bilateral pleural effusions.    --- End of Report ---            ELLIOT LANDAU MD; Attending Radiologist  This document has been electronically signed. May 19 2023  2:14PM (23 @ 13:46)

## 2023-05-24 NOTE — PROGRESS NOTE ADULT - PROBLEM SELECTOR PLAN 7
some MOISES  elevated bili  s/p head CT scan no new findings   ongoing medical management. Primary team managing some MOISES  elevated bili  s/p head CT scan no new findings   ? need for behavioral health consult - h/o anxiety and depression?   ongoing medical management. Primary team managing

## 2023-05-24 NOTE — PROGRESS NOTE ADULT - ASSESSMENT
66 yo male, PMHx of HFrEF 15-20% s/p ICD, decreased RV function, mild MR/TR, DM type I, DL, HTN, CAD, hx MI, s/p CABG, s/p stent (2018), PAD s/p 3 LLE stents, TIAGO, Psorias, presents s/p fall  called for MOISES / stage 3b ckd  # Hypotension / on midodirine  # TTE reviewed, EF 20%  # u sodium < 20/ in favor of decrease effective renal blood flow / CRS   # creat improved on iv bumex   # appreciate heart failure f/u   # repeat K/L/ IF weak ig G kappa in the past, can f/u outpatient   # CT : left renal pelvis dilatation with ? UPJ obstruction / sono noted needs  evaluation  # last OBED positive/ further w/u, rheum eval outpatient        Nephrology signing off  Recall as needed

## 2023-05-24 NOTE — PROGRESS NOTE ADULT - SUBJECTIVE AND OBJECTIVE BOX
HPI:  67-year-old male w/ HFrEF 15-20% s/p ICD, decreased RV function, mild MR/TR, DM type I w/ neuropathy and hx hypoglycemia , DL, HTN, CAD, hx MI, s/p CABG, s/p stent (2018), hx in-stent thrombosis while on Plavix, PAD s/p 3 LLE stents, TIAGO (needs CPAP auto-regulating pressure 10-16, patient doesn't use it due to claustrophobia), Psoriasis, R-AKA presenting to ED s/p fall. He states he was lying in bed at home when he fell asleep and fell off the bed, and complains of generalized headache neck pain, non-radiating, no alleviating or aggravating factors, associated with right anterior chest wall pain. Denies LOC,  fevers, chills, nausea, vomiting, dizziness, abdominal pain, shortness of breath. States he had TDAP recently. Pt was not down for long as wife was upstairs when he fell off the bed and called EMS right away.       Interval History    ADVANCE DIRECTIVES:    [ ] Full Code [ ] DNR  MOLST  [ ]  Living Will  [ ]   DECISION MAKER(s):  [ ] Health Care Proxy(s)  [ ] Surrogate(s)  [ ] Guardian           Name(s): Phone Number(s):    BASELINE (I)ADL(s) (prior to admission):  Ray: [ ]Total  [ ] Moderate [ ]Dependent    Allergies    ACE inhibitors (Rash)  Entresto (Swelling)  Atorvastatin Calcium (Unknown)  Bactrim (Unknown)  Plavix (Unknown)    Intolerances    MEDICATIONS  (STANDING):  aspirin enteric coated 81 milliGRAM(s) Oral daily  buMETAnide Injectable 2 milliGRAM(s) IV Push every 12 hours  chlorhexidine 2% Cloths 1 Application(s) Topical daily  dextrose 5% + sodium chloride 0.9%. 1000 milliLiter(s) (50 mL/Hr) IV Continuous <Continuous>  dextrose 5%. 1000 milliLiter(s) (100 mL/Hr) IV Continuous <Continuous>  dextrose 5%. 1000 milliLiter(s) (50 mL/Hr) IV Continuous <Continuous>  dextrose 50% Injectable 25 Gram(s) IV Push once  dextrose 50% Injectable 12.5 Gram(s) IV Push once  dextrose 50% Injectable 25 Gram(s) IV Push once  diazepam    Tablet 1 milliGRAM(s) Oral at bedtime  DULoxetine 30 milliGRAM(s) Oral <User Schedule>  glucagon  Injectable 1 milliGRAM(s) IntraMuscular once  heparin   Injectable 5000 Unit(s) SubCutaneous every 12 hours  hydrocortisone 2.5% Cream 1 Application(s) Topical two times a day  insulin glargine Injectable (LANTUS) 12 Unit(s) SubCutaneous at bedtime  insulin lispro (ADMELOG) corrective regimen sliding scale   SubCutaneous three times a day before meals  iron sucrose IVPB 200 milliGRAM(s) IV Intermittent every 24 hours  lactulose Syrup 10 Gram(s) Oral at bedtime  levothyroxine 25 MICROGram(s) Oral <User Schedule>  levothyroxine 50 MICROGram(s) Oral <User Schedule>  methotrexate (Non - oncologic) 10 milliGRAM(s) Oral every week  metoprolol succinate ER 25 milliGRAM(s) Oral daily  midodrine. 10 milliGRAM(s) Oral <User Schedule>  pantoprazole    Tablet 40 milliGRAM(s) Oral before breakfast  polyethylene glycol 3350 17 Gram(s) Oral two times a day  prasugrel 10 milliGRAM(s) Oral daily  senna 2 Tablet(s) Oral at bedtime  sodium chloride 3%. 150 milliLiter(s) (50 mL/Hr) IV Continuous <Continuous>  sodium chloride 3%. 150 milliLiter(s) (50 mL/Hr) IV Continuous <Continuous>  sodium chloride 3%. 150 milliLiter(s) (50 mL/Hr) IV Continuous <Continuous>    MEDICATIONS  (PRN):  acetaminophen     Tablet .. 325 milliGRAM(s) Oral every 4 hours PRN Moderate Pain (4 - 6)  dextrose Oral Gel 15 Gram(s) Oral once PRN Blood Glucose LESS THAN 70 milliGRAM(s)/deciliter  oxyCODONE    IR 10 milliGRAM(s) Oral four times a day PRN Moderate Pain (4 - 6)    PRESENT SYMPTOMS: [ ]Unable to obtain due to poor mentation   Source if other than patient:  [ ]Family   [ ]Team     Pain: [ ]yes [ ]no  QOL impact -   Location -                    Aggravating factors -  Quality -  Radiation -  Timing-  Severity (0-10 scale):  Minimal acceptable level (0-10 scale):     CPOT:    https://[email or web address]/getattachment/[email or web address]/Critical-Care-Pain-Observation-Tool-(CPOT)    PAIN AD Score:   [email or web address]/painad.pdf (press ctrl +  left click to view)    Dyspnea:                           [ ]None[ ]Mild [ ]Moderate [ ]Severe     Respiratory Distress Observation Scale (RDOS):   A score of 0 to 2 signifies little or no respiratory distress, 3 signifies mild distress, scores 4 to 6 indicate moderate distress, and scores greater than 7 signify severe distress  [email or web address]/[email or web address]-shirlnzydgn-xlhsxpdv-sggxrtmsojm-cmvhw.pdf    Anxiety:                             [ ]None[ ]Mild [ ]Moderate [ ]Severe   Fatigue:                             [ ]None[ ]Mild [ ]Moderate [ ]Severe   Nausea:                             [ ]None[ ]Mild [ ]Moderate [ ]Severe   Loss of appetite:              [ ]None[ ]Mild [ ]Moderate [ ]Severe   Constipation:                    [ ]None[ ]Mild [ ]Moderate [ ]Severe    Other Symptoms:  [ ]All other review of systems negative     Palliative Performance Status Version 2:         %    [email or web address]://[email or web address]/[email or web address]_performance_scale_[email or web address].pdf  PHYSICAL EXAM:  Vital Signs Last 24 Hrs  T(C): 35.4 (24 May 2023 05:39), Max: 36.6 (23 May 2023 20:21)  T(F): 95.7 (24 May 2023 05:39), Max: 97.8 (23 May 2023 20:21)  HR: 81 (24 May 2023 05:39) (81 - 83)  BP: 105/74 (24 May 2023 05:39) (97/67 - 105/74)  BP(mean): --  RR: 18 (24 May 2023 05:39) (18 - 18)  SpO2: --     I&O's Summary    23 May 2023 07:01  -  24 May 2023 07:00  --------------------------------------------------------  IN: 530 mL / OUT: 2350 mL / NET: -1820 mL    24 May 2023 07:01  -  24 May 2023 13:38  --------------------------------------------------------  IN: 115 mL / OUT: 1600 mL / NET: -1485 mL      GENERAL:  [ ] No acute distress [ ]Lethargic  [ ]Unarousable  [ ]Verbal  [ ]Non-Verbal [ ]Cachexia    BEHAVIORAL/PSYCH:  [x ]Alert and Oriented x  2[ x] Anxiety [ x] Delirium [ ] Agitation [ ] Calm   EYES: [ ] No scleral icterus [ ] Scleral icterus [ ] Closed  ENMT:  [ ]Dry mouth  [ ]No external oral lesions [ ] No external ear or nose lesions  CARDIOVASCULAR:  [ ]Regular [ ]Irregular [ ]Tachy [ ]Not Tachy  [ ]Clifton [ ] Edema [ ] No edema  PULMONARY:  [ ]Tachypnea  [ ]Audible excessive secretions [ x] No labored breathing [ ] labored breathing  GASTROINTESTINAL: [ x]Soft  [ ]Distended  [ ]Not distended [ ]Non tender [ ]Tender  MUSCULOSKELETAL: [ ]No clubbing [ ] clubbing  [ ] No cyanosis [ ] cyanosis  NEUROLOGIC: [ ]No focal deficits  [x ]Follows commands  [ ]Does not follow commands  [ ]Cognitive impairment  [ ]Dysphagia  [ ]Dysarthria  [ ]Paresis   SKIN: [ ] Jaundiced [x ] Non-jaundiced [ ]Rash [ ]No Rash [ ] Warm [ x] Multiple small sores   MISC/LINES: [ ] ET tube [ ] Trach [ ]NGT/OGT [ ]PEG [ x]Loja    CRITICAL CARE:  [ ] Shock Present  [ ]Septic [ ]Cardiogenic [ ]Neurologic [ ]Hypovolemic  [ ]  Vasopressors [ ]  Inotropes   [ ]Respiratory failure present [ ]Mechanical ventilation [ ]Non-invasive ventilatory support [ ]High flow  [ ]Acute  [ ]Chronic [ ]Hypoxic  [ ]Hypercarbic [ ]Other  [ ]Other organ failure     LABS: reviewed by me                        10.3   7.73  )-----------( 283      ( 24 May 2023 07:10 )             36.2   05-24    140  |  97<L>  |  46<H>  ----------------------------<  77  3.9   |  31  |  1.7<H>    Ca    8.6      24 May 2023 07:10  Mg     2.4     05-24    TPro  6.6  /  Alb  3.3<L>  /  TBili  1.3<H>  /  DBili  x   /  AST  27  /  ALT  37  /  AlkPhos  122<H>  05-24        RADIOLOGY & ADDITIONAL STUDIES: reviewed by me    EKG: reviewed by me        REFERRALS:   [ ]Chaplaincy  [x ]Hospice  [ ]Child Life  [x ]Social Work  [ x]Case management [ ]Holistic Therapy     Patient discussed with primary medical team MD  Palliative care education provided to patient and/or family    Goals of Care Document:    HPI:  67-year-old male w/ HFrEF 15-20% s/p ICD, decreased RV function, mild MR/TR, DM type I w/ neuropathy and hx hypoglycemia , DL, HTN, CAD, hx MI, s/p CABG, s/p stent (2018), hx in-stent thrombosis while on Plavix, PAD s/p 3 LLE stents, TIAGO (needs CPAP auto-regulating pressure 10-16, patient doesn't use it due to claustrophobia), Psoriasis, R-AKA presenting to ED s/p fall. He states he was lying in bed at home when he fell asleep and fell off the bed, and complains of generalized headache neck pain, non-radiating, no alleviating or aggravating factors, associated with right anterior chest wall pain. Denies LOC,  fevers, chills, nausea, vomiting, dizziness, abdominal pain, shortness of breath. States he had TDAP recently. Pt was not down for long as wife was upstairs when he fell off the bed and called EMS right away.       Interval History  - Patient seen w wife today. Appears in some emotional distress, and complaining of pain.     ADVANCE DIRECTIVES:    [x ] Full Code [ ] DNR  MOLST  [ ]  Living Will  [ ]   DECISION MAKER(s):  [x ] Health Care Proxy(s)  [x ] Surrogate(s)  [ ] Guardian           Name(s): Phone Number(s):  Answers: Emergency Contact Name Raine,  Answers: Emergency Contact Phone # [phone],  Answers: Emergency Contact Relationship wife  BASELINE (I)ADL(s) (prior to admission):  [place]: [ ]Total  [ ] Moderate [x ]Dependent    Allergies    ACE inhibitors (Rash)  Entresto (Swelling)  Atorvastatin Calcium (Unknown)  Bactrim (Unknown)  Plavix (Unknown)    Intolerances    MEDICATIONS  (STANDING):  aspirin enteric coated 81 milliGRAM(s) Oral daily  buMETAnide Injectable 2 milliGRAM(s) IV Push every 12 hours  chlorhexidine 2% Cloths 1 Application(s) Topical daily  dextrose 5% + sodium chloride 0.9%. 1000 milliLiter(s) (50 mL/Hr) IV Continuous <Continuous>  dextrose 5%. 1000 milliLiter(s) (100 mL/Hr) IV Continuous <Continuous>  dextrose 5%. 1000 milliLiter(s) (50 mL/Hr) IV Continuous <Continuous>  dextrose 50% Injectable 25 Gram(s) IV Push once  dextrose 50% Injectable 12.5 Gram(s) IV Push once  dextrose 50% Injectable 25 Gram(s) IV Push once  diazepam    Tablet 1 milliGRAM(s) Oral at bedtime  DULoxetine 30 milliGRAM(s) Oral <User Schedule>  glucagon  Injectable 1 milliGRAM(s) IntraMuscular once  heparin   Injectable 5000 Unit(s) SubCutaneous every 12 hours  hydrocortisone 2.5% Cream 1 Application(s) Topical two times a day  insulin glargine Injectable (LANTUS) 12 Unit(s) SubCutaneous at bedtime  insulin lispro (ADMELOG) corrective regimen sliding scale   SubCutaneous three times a day before meals  iron sucrose IVPB 200 milliGRAM(s) IV Intermittent every 24 hours  lactulose Syrup 10 Gram(s) Oral at bedtime  levothyroxine 25 MICROGram(s) Oral <User Schedule>  levothyroxine 50 MICROGram(s) Oral <User Schedule>  methotrexate (Non - oncologic) 10 milliGRAM(s) Oral every week  metoprolol succinate ER 25 milliGRAM(s) Oral daily  midodrine. 10 milliGRAM(s) Oral <User Schedule>  pantoprazole    Tablet 40 milliGRAM(s) Oral before breakfast  polyethylene glycol 3350 17 Gram(s) Oral two times a day  prasugrel 10 milliGRAM(s) Oral daily  senna 2 Tablet(s) Oral at bedtime  sodium chloride 3%. 150 milliLiter(s) (50 mL/Hr) IV Continuous <Continuous>  sodium chloride 3%. 150 milliLiter(s) (50 mL/Hr) IV Continuous <Continuous>  sodium chloride 3%. 150 milliLiter(s) (50 mL/Hr) IV Continuous <Continuous>    MEDICATIONS  (PRN):  acetaminophen     Tablet .. 325 milliGRAM(s) Oral every 4 hours PRN Moderate Pain (4 - 6)  dextrose Oral Gel 15 Gram(s) Oral once PRN Blood Glucose LESS THAN 70 milliGRAM(s)/deciliter  oxyCODONE    IR 10 milliGRAM(s) Oral four times a day PRN Moderate Pain (4 - 6)    PRESENT SYMPTOMS: [x ]Unable to obtain due to poor mentation   Source if other than patient:  [ ]Family   [ ]Team     Pain: [ x]yes [ ]no  QOL impact - significant   Location -     chest, arms                Aggravating factors - pressure   Quality - sharp throbbing   Radiation - no  Timing- constant   Severity (0-10 scale): 10/10   Minimal acceptable level (0-10 scale): 4/10      Dyspnea:                           [ x]None[ ]Mild [ ]Moderate [ ]Severe   Anxiety:                             [ ]None[ ]Mild [ ]Moderate [ ]Severe   Fatigue:                             [ ]None[ ]Mild [ ]Moderate [ ]Severe   Nausea:                             [ ]None[ ]Mild [ ]Moderate [ ]Severe   Loss of appetite:              [ ]None[ ]Mild [ ]Moderate [ ]Severe   Constipation:                    [ ]None[ ]Mild [ ]Moderate [ ]Severe    Other Symptoms:  [x ]All other review of systems negative     Palliative Performance Status Version 2:         %    [email or web address]/[email or web address]_performance_scale_[email or web address].pdf  PHYSICAL EXAM:  Vital Signs Last 24 Hrs  T(C): 35.4 (24 May 2023 05:39), Max: 36.6 (23 May 2023 20:21)  T(F): 95.7 (24 May 2023 05:39), Max: 97.8 (23 May 2023 20:21)  HR: 81 (24 May 2023 05:39) (81 - 83)  BP: 105/74 (24 May 2023 05:39) (97/67 - 105/74)  BP(mean): --  RR: 18 (24 May 2023 05:39) (18 - 18)  SpO2: --     I&O's Summary    23 May 2023 07:01  -  24 May 2023 07:00  --------------------------------------------------------  IN: 530 mL / OUT: 2350 mL / NET: -1820 mL    24 May 2023 07:01  -  24 May 2023 13:38  --------------------------------------------------------  IN: 115 mL / OUT: 1600 mL / NET: -1485 mL      GENERAL:  [ ] No acute distress [ ]Lethargic  [ ]Unarousable  [ ]Verbal  [ ]Non-Verbal [ ]Cachexia    BEHAVIORAL/PSYCH:  [x ]Alert and Oriented x  2[ x] Anxiety [ x] Delirium [ ] Agitation [ ] Calm   EYES: [ ] No scleral icterus [ ] Scleral icterus [ ] Closed  ENMT:  [ ]Dry mouth  [ ]No external oral lesions [ ] No external ear or nose lesions  CARDIOVASCULAR:  [ ]Regular [ ]Irregular [ ]Tachy [ ]Not Tachy  [ ]Clifton [ ] Edema [ ] No edema  PULMONARY:  [ ]Tachypnea  [ ]Audible excessive secretions [ x] No labored breathing [ ] labored breathing  GASTROINTESTINAL: [ x]Soft  [ ]Distended  [ ]Not distended [ ]Non tender [ ]Tender  MUSCULOSKELETAL: [ ]No clubbing [ ] clubbing  [ ] No cyanosis [ ] cyanosis  NEUROLOGIC: [ ]No focal deficits  [x ]Follows commands  [ ]Does not follow commands  [ ]Cognitive impairment  [ ]Dysphagia  [ ]Dysarthria  [ ]Paresis   SKIN: [ ] Jaundiced [x ] Non-jaundiced [ ]Rash [ ]No Rash [ ] Warm [ x] Multiple small sores   MISC/LINES: [ ] ET tube [ ] Trach [ ]NGT/OGT [ ]PEG [ x]Loja    CRITICAL CARE:  [ ] Shock Present  [ ]Septic [ ]Cardiogenic [ ]Neurologic [ ]Hypovolemic  [ ]  Vasopressors [ ]  Inotropes   [ ]Respiratory failure present [ ]Mechanical ventilation [ ]Non-invasive ventilatory support [ ]High flow  [ ]Acute  [ ]Chronic [ ]Hypoxic  [ ]Hypercarbic [ ]Other  [ ]Other organ failure     LABS: reviewed by me                        10.3   7.73  )-----------( 283      ( 24 May 2023 07:10 )             36.2   05-24    140  |  97<L>  |  46<H>  ----------------------------<  77  3.9   |  31  |  1.7<H>    Ca    8.6      24 May 2023 07:10  Mg     2.4     05-24    TPro  6.6  /  Alb  3.3<L>  /  TBili  1.3<H>  /  DBili  x   /  AST  27  /  ALT  37  /  AlkPhos  122<H>  05-24        RADIOLOGY & ADDITIONAL STUDIES: reviewed by me    EKG: reviewed by me        REFERRALS:   [ ]Chaplaincy  [x ]Hospice  [ ]Child Life  [x ]Social Work  [ x]Case management [ ]Holistic Therapy     Patient discussed with primary medical team MD  Palliative care education provided to patient and/or family    Goals of Care Document:

## 2023-05-24 NOTE — PROGRESS NOTE ADULT - SUBJECTIVE AND OBJECTIVE BOX
FLOWER MARISCAL 67y Male  MRN#: [id number]   Hospital Day: 6d    SUBJECTIVE  Patient is a 67y old Male who presents with a chief complaint of Mechanical fall (23 May 2023 21:56)  Currently admitted to medicine with the primary diagnosis of Fall      INTERVAL HPI AND OVERNIGHT EVENTS:  Patient was examined and seen at bedside. This morning he is resting comfortably in bed. No issues or overnight events.    OBJECTIVE  PAST MEDICAL & SURGICAL HISTORY  Status post percutaneous transluminal coronary angioplasty  2 stents    Atherosclerosis of coronary artery  CAD (coronary artery disease)    Osteoarthritis    HLD (hyperlipidemia)    Diabetes  on insulin pump    CHF (congestive heart failure)  EF ~ 25%    History of celiac disease    Diverticulitis    STEMI (ST elevation myocardial infarction)    Diabetic neuropathy  Hands and Feet    Anxiety and depression    Other postprocedural status  Fixation hardware in foot LEFT    Stented coronary artery  10/18 heart attack  INFERIOR WALL MI    S/P CABG x 1  2018    S/P TKR (total knee replacement), right  with infection Mrsa   per pt he was cleared from MRSA infection    Surgery, elective  right knee wound debridement    History of open reduction and internal fixation (ORIF) procedure  right hip    H/O shoulder surgery  right    AICD (automatic cardioverter/defibrillator) present  [facility]    Cholecystostomy care  drain inserted 2020 & removed 4 months ago    History of tonsillectomy    History of hip replacement, total, right    Elective surgery  plastic surgery Left shin      ALLERGIES:  ACE inhibitors (Rash)  Entresto (Swelling)  Atorvastatin Calcium (Unknown)  Bactrim (Unknown)  Plavix (Unknown)    MEDICATIONS:  STANDING MEDICATIONS  aspirin enteric coated 81 milliGRAM(s) Oral daily  buMETAnide Injectable 2 milliGRAM(s) IV Push every 12 hours  chlorhexidine 2% Cloths 1 Application(s) Topical daily  dextrose 5% + sodium chloride 0.9%. 1000 milliLiter(s) IV Continuous <Continuous>  dextrose 5%. 1000 milliLiter(s) IV Continuous <Continuous>  dextrose 5%. 1000 milliLiter(s) IV Continuous <Continuous>  dextrose 50% Injectable 25 Gram(s) IV Push once  dextrose 50% Injectable 12.5 Gram(s) IV Push once  dextrose 50% Injectable 25 Gram(s) IV Push once  diazepam    Tablet 1 milliGRAM(s) Oral at bedtime  DULoxetine 30 milliGRAM(s) Oral <User Schedule>  glucagon  Injectable 1 milliGRAM(s) IntraMuscular once  heparin   Injectable 5000 Unit(s) SubCutaneous every 12 hours  hydrocortisone 2.5% Cream 1 Application(s) Topical two times a day  insulin glargine Injectable (LANTUS) 12 Unit(s) SubCutaneous at bedtime  insulin lispro (ADMELOG) corrective regimen sliding scale   SubCutaneous three times a day before meals  iron sucrose IVPB 200 milliGRAM(s) IV Intermittent every 24 hours  lactulose Syrup 10 Gram(s) Oral at bedtime  levothyroxine 25 MICROGram(s) Oral <User Schedule>  levothyroxine 50 MICROGram(s) Oral <User Schedule>  methotrexate (Non - oncologic) 10 milliGRAM(s) Oral every week  metoprolol succinate ER 25 milliGRAM(s) Oral daily  midodrine. 10 milliGRAM(s) Oral <User Schedule>  pantoprazole    Tablet 40 milliGRAM(s) Oral before breakfast  polyethylene glycol 3350 17 Gram(s) Oral two times a day  prasugrel 10 milliGRAM(s) Oral daily  senna 2 Tablet(s) Oral at bedtime  sodium chloride 3%. 150 milliLiter(s) IV Continuous <Continuous>  sodium chloride 3%. 150 milliLiter(s) IV Continuous <Continuous>  sodium chloride 3%. 150 milliLiter(s) IV Continuous <Continuous>    PRN MEDICATIONS  acetaminophen     Tablet .. 325 milliGRAM(s) Oral every 4 hours PRN  dextrose Oral Gel 15 Gram(s) Oral once PRN  oxyCODONE    IR 10 milliGRAM(s) Oral four times a day PRN      VITAL SIGNS: Last 24 Hours  T(C): 35.4 (24 May 2023 05:39), Max: 36.6 (23 May 2023 20:21)  T(F): 95.7 (24 May 2023 05:39), Max: 97.8 (23 May 2023 20:21)  HR: 81 (24 May 2023 05:39) (80 - 83)  BP: 105/74 (24 May 2023 05:39) (97/67 - 116/74)  BP(mean): --  RR: 18 (24 May 2023 05:39) (18 - 18)  SpO2: --    LABS:                        10.3   7.26  )-----------( 296      ( 23 May 2023 04:30 )             36.3     05-23    139  |  96<L>  |  47<H>  ----------------------------<  188<H>  3.9   |  30  |  1.7<H>    Ca    8.7      23 May 2023 18:16  Mg     2.0     05-23    TPro  7.0  /  Alb  3.6  /  TBili  1.3<H>  /  DBili  x   /  AST  32  /  ALT  45<H>  /  AlkPhos  128<H>  05-23          Lactate, Blood: 2.5 mmol/L *H* (05-24-23 @ 04:30)    Physical Exam:  CONSTITUTIONAL: No acute distress, alert and following commands, AxO=3  HEAD: Atraumatic, normocephalic  EYES: EOM intact, PERRLA, conjunctiva and sclera clear  PULMONARY: decreased right lung  CARDIOVASCULAR: Regular rate and rhythm  GASTROINTESTINAL: Soft, non-tender, midly-distended; bowel sounds present  NEUROLOGY: non-focal  EXT: R AKA

## 2023-05-24 NOTE — PROGRESS NOTE ADULT - ASSESSMENT
67-year-old male w/ HFrEF 15-20% s/p ICD, decreased RV function, mild MR/TR, DM type I w/ neuropathy and hx hypoglycemia , DL, HTN, CAD, hx MI, s/p CABG, s/p stent (2018), hx in-stent thrombosis while on Plavix, PAD s/p 3 LLE stents, TIAGO (needs CPAP auto-regulating pressure 10-16, patient doesn't use it due to claustrophobia), Psoriasis, R-AKA presenting to ED s/p fall. He states he was lying in bed at home when he fell asleep and fell off the bed. Found to have moises and mild elevation of trop     1. Mechanical Fall   -No CT evidence for acute traumatic injury within the chest, abdomen or pelvis  -No evidence of acute intracranial pathology. Stable exam since 2023.  -No evidence of acute cervical spine fracture or subluxation.  -Vitals wnl   -PT eval:pending      2.Likely Acute systolic CHF complicated by MOISES (cardiorenal) and Troponinemia  * pt denies chest pain but report orthopnea  - Telemetry            - ECG :paced rythm     -CXR: congestion with BNP 6000   - Cont asa/prasugrel   - Cont metoprolol  - has allergy to ACE and Entresto   - c/w Midodrine 10mg TID  - Loja for strict I&O   - Trend trop   (detectable likely demand ischemia in the setting of chf)   - CT chest notable for Moderate to large bilateral pleural effusions, right greater than left. Small to moderate volume abdominopelvic ascites.  - Cardio consult appreaciated >> Lasix, HF consult  - Nephro consult appreaciated  - HF consult apprecaited >> Bumex 2mg IV BID with 3% hypertonic saline, pt tolerating the Bumex well, BP under control  - Ferritin 53 >> start venofer 200mg for 5 days as per HF    3.  DM type 1 complicated by hypoglycemia  episodes of hypoglycemia at home  - Insulin protocol lantus and nutritional insulin was decreased.     4.Hypothyroid   -c/w  synthroid    5. Mild hyperkalemia (resolved)    6.Recent dx of RA   * pt was dx with RA due to upper ext arterial ulcer?   - Cont Methotrexate (last dose was 2023)     # DVT prophylaxis - lovenox   #R-LE non-weight bearing  #carb-consistent/dash diet  #full code (discussed GOC with pt on ), Palliative team consult appreciated    pendin) clinical improvement, fluid overload improvement 67-year-old male w/ HFrEF 15-20% s/p ICD, decreased RV function, mild MR/TR, DM type I w/ neuropathy and hx hypoglycemia , DL, HTN, CAD, hx MI, s/p CABG, s/p stent (2018), hx in-stent thrombosis while on Plavix, PAD s/p 3 LLE stents, TIAGO (needs CPAP auto-regulating pressure 10-16, patient doesn't use it due to claustrophobia), Psoriasis, R-AKA presenting to ED s/p fall. He states he was lying in bed at home when he fell asleep and fell off the bed. Found to have moises and mild elevation of trop     1. Mechanical Fall   -No CT evidence for acute traumatic injury within the chest, abdomen or pelvis  -No evidence of acute intracranial pathology. Stable exam since 2023.  -No evidence of acute cervical spine fracture or subluxation.  -Vitals wnl   -PT eval: pending      2.Likely Acute systolic CHF complicated by MOISES (cardiorenal) and Troponinemia  * pt denies chest pain but report orthopnea  - Telemetry            - ECG :paced rythm     -CXR: congestion with BNP 6000   - Cont asa/prasugrel   - Cont metoprolol  - has allergy to ACE and Entresto   - decrease Midodrine to 10mg BID  - Loja for strict I&O   - Trend trop   (detectable likely demand ischemia in the setting of chf)   - CT chest notable for Moderate to large bilateral pleural effusions, right greater than left. Small to moderate volume abdominopelvic ascites.  - Cardio consult appreciated >> Lasix, HF consult  - Nephro consult appreaciated  - HF consult apprecaited >> Bumex 2mg IV BID with 3% hypertonic saline, pt tolerating the Bumex well, BP under control  - Ferritin 53 >> start venofer 200mg for 5 days as per HF    3.  DM type 1 complicated by hypoglycemia  episodes of hypoglycemia at home  - Insulin protocol lantus and nutritional insulin was decreased.     4.Hypothyroid   -c/w  synthroid    5. Mild hyperkalemia (resolved)    6.Recent dx of RA   * pt was dx with RA due to upper ext arterial ulcer?   - Cont Methotrexate (last dose was 2023)     # DVT prophylaxis - lovenox   #R-LE non-weight bearing  #carb-consistent/dash diet  #full code (discussed GOC with pt on ), Palliative team consult appreciated    pendin) clinical improvement, fluid overload improvement

## 2023-05-24 NOTE — PROGRESS NOTE ADULT - PROBLEM SELECTOR PLAN 3
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 He wants to be a full code w aggressive medical care. 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 He wants to be a full code w aggressive medical care. Spoke to spouse as pt is confused at times, mental status waxes and wanes.

## 2023-05-25 NOTE — PROGRESS NOTE ADULT - ASSESSMENT
66 yo M PMHx chronic HFrEF 15-20% s/p ICD, decreased RV function, mild MR/TR, DM type I w/ neuropathy and hx hypoglycemia, DLD, HTN, CAD, hx MI, s/p CABG, s/p stent (2018), hx in-stent thrombosis while on Plavix, PAD s/p 3 LLE stents, TIAGO (needs CPAP auto-regulating pressure 10-16, patient doesn't use it due to claustrophobia), Psoriasis, R-AKA presenting to ED s/p fall. He states he was lying in bed at home when he fell asleep and fell off the bed. Found to have moises and mild elevation of trop       A/P:   Acute on Chronic HFrEF:   Patient with SOB, Pro-BNP 9100  CT chest and abdomen showed moderate tp large bilateral pleural effusions, right greater than left. Small to moderate volume abdominopelvic ascites.  Echo 5/24/23 showed LVEF <20%, mod MR, mod to severe TR.   Continue Bumex 2mg IV BID and Hypertonic saline 3% BID.   Continue Metoprolol. On Midodrine, wean off   had "anaphlyatic reaction" to ACE-I (presumably angioedema?) and has allergy to Entresto  Low sodium diet, fluid restriction 1.2L/day,     CAD s/p PCI  Type 2 NSTEMI due to demand ischemia:   Troponin 0.09>0.10  EKG showed paced rhythm.   Continue Aspirin, Prasugrel, Metoprolol    Mechanical Fall   No CT evidence for acute traumatic injury within the chest, abdomen or pelvis  Fall Precaution, PT follow up.     MOISES on CKD III  likely cardiorenal vs obstruction.   Cr increased to 2.6, now improving to 1.5, baseline 1.1  Kidney US:  showed Mild left hydronephrosis/dilated pelvis, unchanged. Left lower pole 0.5 cm nonobstructing calculus.  s/p Loja placement.     Iron deficiency anemia:   Iron studies reviewed Iron level 13, Iron sat 3%  Continue Venofer 200mg IV daily.     Transaminitis  Mild elevation, AST/ALT< resolved, likely congestive hepatopathy.     DM type 1 complicated by hypoglycemia  episodes of hypoglycemia at home  - Insulin protocol lantus and nutritional insulin was decreased.     Hypothyroidism: continue Synthroid    Recent dx of RA    pt was dx with RA due to upper ext arterial ulcer?   Cont Methotrexate weekly.     DVT prophylaxis - lovenox   full code    #Progress Note Handoff:  Pending (specify):  improving volume overload.   Family discussion: with his wife, update about echo result,   Disposition: Home vs SNF.

## 2023-05-25 NOTE — PROGRESS NOTE ADULT - PROBLEM SELECTOR PLAN 7
some MOISES  elevated bili  s/p head CT scan no new findings   ? need for behavioral health consult - spoke to primary team today and recommended

## 2023-05-25 NOTE — PROGRESS NOTE ADULT - ASSESSMENT
Assessment: 67-year-old male w/ HFrEF 15-20% s/p ICD, DM type I, DL, HTN, CAD, hx MI, s/p CABG, s/p stent (2018), hx in-stent thrombosis while on Plavix, PAD s/p 3 LLE stents, TIAGO non-compliant w/ CPAP, R-AKA presenting to ED s/p fall (mechanical). Patient found to be fluid overloaded with MOISES, heart failure consulted for further management of ADHF.      Plan:  Patient remains fluid overloaded on exam 2/2 acute decompensated heart failure  POCUS exam: IVC 2.4 cm  Continue Bumex 2mg IVP BID  Continue 3% Hypertonic Saline 150ml BID (If infused throughout a central line, run over one hour, if a peripheral line is to be used run over 3 hours) *Start infusion, after 30 minutes- give Bumex IVP then continue rest of infusion*  Continue low dose BB  Consider reinstating home spironolactone   Get BMP twice daily with magnesium   Maintain potassium >4.0, Mg >2.2  Continue to wean midodrine as tolerated- goal SBP > 85 mmHg  Strict intake and output  Daily weight   Physical therapy follow-up / OOB to chair   Continue IV iron sucrose as 200 mg iv daily over one hour for 5 days   Plan discussed with patient, spouse present at bedside, and primary team  Will continue to follow

## 2023-05-25 NOTE — PROGRESS NOTE ADULT - ASSESSMENT
67M with PMH of HRrEF (15-20% EF) s/p AICD, DM1, HLD, HTN CAD s/p MI s/p CABG, PAD s/p 3 LLE stents, TIAGO, psoariasis, and R AKA here after mechanical fall, without acute trauma noted. Course c/b acute-on-chronic HFrEF and CRS, with type 2 NSTEMI due to demand ischemia, on diuresis. Also with mild transaminitis, likely from congestion. Palliative care consulted for GOC. Patient is full code. 67M with PMH of HRrEF (15-20% EF) s/p AICD, DM1, HLD, HTN CAD s/p MI s/p CABG, PAD s/p 3 LLE stents, TIAGO, psoariasis, and R AKA here after mechanical fall, without acute trauma noted. Course c/b acute-on-chronic HFrEF and CRS, with type 2 NSTEMI due to demand ischemia, on diuresis. Also with mild transaminitis, likely from congestion. Palliative care consulted for GOC. Patient is full code.    See GOC note today. Discussed case with 3C resident and asked him to update the wife and patient. Also plan for a psych eval.

## 2023-05-25 NOTE — PROGRESS NOTE ADULT - SUBJECTIVE AND OBJECTIVE BOX
LOIDA FLOWER  67y  Male      Patient is a 67y old  Male who presents with a chief complaint of Mechanical fall (25 May 2023 14:55)      INTERVAL HPI/OVERNIGHT EVENTS:  No chest pain, no fever  Vital Signs Last 24 Hrs  T(C): 35 (25 May 2023 12:19), Max: 35.8 (24 May 2023 21:24)  T(F): 95 (25 May 2023 12:19), Max: 96.5 (24 May 2023 21:24)  HR: 81 (25 May 2023 12:19) (77 - 81)  BP: 100/61 (25 May 2023 12:19) (100/61 - 107/63)  BP(mean): --  RR: 18 (25 May 2023 12:19) (18 - 18)  SpO2: 91% (25 May 2023 04:27) (91% - 91%)          05-24-23 @ 07:01  -  05-25-23 @ 07:00  --------------------------------------------------------  IN: 225 mL / OUT: 4400 mL / NET: -4175 mL    05-25-23 @ 07:01  -  05-25-23 @ 17:07  --------------------------------------------------------  IN: 0 mL / OUT: 600 mL / NET: -600 mL            Consultant(s) Notes Reviewed:  [x ] YES  [ ] NO          MEDICATIONS  (STANDING):  aspirin enteric coated 81 milliGRAM(s) Oral daily  buMETAnide Injectable 2 milliGRAM(s) IV Push every 12 hours  chlorhexidine 2% Cloths 1 Application(s) Topical daily  dextrose 5% + sodium chloride 0.9%. 1000 milliLiter(s) (50 mL/Hr) IV Continuous <Continuous>  dextrose 5%. 1000 milliLiter(s) (100 mL/Hr) IV Continuous <Continuous>  dextrose 5%. 1000 milliLiter(s) (50 mL/Hr) IV Continuous <Continuous>  dextrose 50% Injectable 25 Gram(s) IV Push once  dextrose 50% Injectable 12.5 Gram(s) IV Push once  dextrose 50% Injectable 25 Gram(s) IV Push once  diazepam    Tablet 1 milliGRAM(s) Oral at bedtime  DULoxetine 30 milliGRAM(s) Oral <User Schedule>  folic acid 1 milliGRAM(s) Oral <User Schedule>  glucagon  Injectable 1 milliGRAM(s) IntraMuscular once  heparin   Injectable 5000 Unit(s) SubCutaneous every 12 hours  hydrocortisone 2.5% Cream 1 Application(s) Topical two times a day  insulin glargine Injectable (LANTUS) 12 Unit(s) SubCutaneous at bedtime  insulin lispro (ADMELOG) corrective regimen sliding scale   SubCutaneous three times a day before meals  iron sucrose IVPB 200 milliGRAM(s) IV Intermittent every 24 hours  lactulose Syrup 10 Gram(s) Oral at bedtime  levothyroxine 25 MICROGram(s) Oral <User Schedule>  levothyroxine 50 MICROGram(s) Oral <User Schedule>  methotrexate (Non - oncologic) 10 milliGRAM(s) Oral every week  metoprolol succinate ER 25 milliGRAM(s) Oral daily  midodrine. 5 milliGRAM(s) Oral <User Schedule>  pantoprazole    Tablet 40 milliGRAM(s) Oral before breakfast  polyethylene glycol 3350 17 Gram(s) Oral two times a day  prasugrel 10 milliGRAM(s) Oral daily  senna 2 Tablet(s) Oral at bedtime  sodium chloride 3%. 150 milliLiter(s) (50 mL/Hr) IV Continuous <Continuous>  sodium chloride 3%. 150 milliLiter(s) (50 mL/Hr) IV Continuous <Continuous>  sodium chloride 3%. 150 milliLiter(s) (50 mL/Hr) IV Continuous <Continuous>  sodium chloride 3%. 150 milliLiter(s) (50 mL/Hr) IV Continuous <Continuous>    MEDICATIONS  (PRN):  acetaminophen     Tablet .. 325 milliGRAM(s) Oral every 4 hours PRN Moderate Pain (4 - 6)  dextrose Oral Gel 15 Gram(s) Oral once PRN Blood Glucose LESS THAN 70 milliGRAM(s)/deciliter  oxyCODONE    IR 10 milliGRAM(s) Oral four times a day PRN Moderate Pain (4 - 6)      LABS                          9.8    8.47  )-----------( 249      ( 25 May 2023 05:46 )             35.0     05-25    142  |  100  |  36<H>  ----------------------------<  94  4.3   |  30  |  1.5    Ca    8.6      25 May 2023 05:46  Mg     2.0     05-25    TPro  6.3  /  Alb  3.2<L>  /  TBili  1.2  /  DBili  x   /  AST  28  /  ALT  30  /  AlkPhos  115  05-25          Lactate Trend  05-24 @ 16:00 Lactate:1.1   05-24 @ 04:30 Lactate:2.5   05-22 @ 18:22 Lactate:2.1         CAPILLARY BLOOD GLUCOSE      POCT Blood Glucose.: 264 mg/dL (25 May 2023 16:35)        RADIOLOGY & ADDITIONAL TESTS:    Imaging Personally Reviewed:  [ ] YES  [ ] NO    HEALTH ISSUES - PROBLEM Dx:  Acute on chronic HFrEF (heart failure with reduced ejection fraction)    Cardiorenal syndrome    Transaminitis    Advanced care planning/counseling discussion    Encounter for palliative care    S/P AKA (above knee amputation), right    Anxiety    Altered mental status            PHYSICAL EXAM:  GENERAL: weak, mild malnourished   HEAD:  Atraumatic, Normocephalic.  EYES: EOMI, PERRLA, conjunctiva and sclera clear.  NECK: Supple, No JVD.  CHEST/LUNG: Clear to auscultation bilaterally; No wheeze.  HEART: Regular rate and rhythm; S1 S2.   ABDOMEN: Soft, Nontender, Nondistended; Bowel sounds present.  EXTREMITIES: right above knee amputation, small LLE healing ulcers.   PSYCH: AAOx3.  NEUROLOGY: non-focal.  SKIN: No rashes or lesions.

## 2023-05-25 NOTE — PROGRESS NOTE ADULT - SUBJECTIVE AND OBJECTIVE BOX
HPI:  67-year-old male w/ HFrEF 15-20% s/p ICD, decreased RV function, mild MR/TR, DM type I w/ neuropathy and hx hypoglycemia , DL, HTN, CAD, hx MI, s/p CABG, s/p stent (2018), hx in-stent thrombosis while on Plavix, PAD s/p 3 LLE stents, TIAGO (needs CPAP auto-regulating pressure 10-16, patient doesn't use it due to claustrophobia), Psoriasis, R-AKA presenting to ED s/p fall. He states he was lying in bed at home when he fell asleep and fell off the bed, and complains of generalized headache neck pain, non-radiating, no alleviating or aggravating factors, associated with right anterior chest wall pain. Denies LOC,  fevers, chills, nausea, vomiting, dizziness, abdominal pain, shortness of breath. States he had TDAP recently. Pt was not down for long as wife was upstairs when he fell off the bed and called EMS right away.     Interval History    Appears in some emotional distress, and complaining of pain.     ADVANCE DIRECTIVES:    [ x] Full Code [ ] DNR  MOLST  [ ]  Living Will  [ ]   DECISION MAKER(s):  [x ] Health Care Proxy(s)  [x ] Surrogate(s)  [ ] Guardian           Name(s): Phone Number(s):    Answers: Emergency Contact Name Raine,  Answers: Emergency Contact Phone # [phone],  Answers: Emergency Contact Relationship wife    BASELINE (I)ADL(s) (prior to admission):  Page: [ ]Total  [ ] Moderate [x ]Dependent    Allergies    ACE inhibitors (Rash)  Entresto (Swelling)  Atorvastatin Calcium (Unknown)  Bactrim (Unknown)  Plavix (Unknown)    Intolerances    MEDICATIONS  (STANDING):  aspirin enteric coated 81 milliGRAM(s) Oral daily  buMETAnide Injectable 2 milliGRAM(s) IV Push every 12 hours  chlorhexidine 2% Cloths 1 Application(s) Topical daily  dextrose 5% + sodium chloride 0.9%. 1000 milliLiter(s) (50 mL/Hr) IV Continuous <Continuous>  dextrose 5%. 1000 milliLiter(s) (100 mL/Hr) IV Continuous <Continuous>  dextrose 5%. 1000 milliLiter(s) (50 mL/Hr) IV Continuous <Continuous>  dextrose 50% Injectable 25 Gram(s) IV Push once  dextrose 50% Injectable 12.5 Gram(s) IV Push once  dextrose 50% Injectable 25 Gram(s) IV Push once  diazepam    Tablet 1 milliGRAM(s) Oral at bedtime  DULoxetine 30 milliGRAM(s) Oral <User Schedule>  folic acid 1 milliGRAM(s) Oral <User Schedule>  glucagon  Injectable 1 milliGRAM(s) IntraMuscular once  heparin   Injectable 5000 Unit(s) SubCutaneous every 12 hours  hydrocortisone 2.5% Cream 1 Application(s) Topical two times a day  insulin glargine Injectable (LANTUS) 12 Unit(s) SubCutaneous at bedtime  insulin lispro (ADMELOG) corrective regimen sliding scale   SubCutaneous three times a day before meals  iron sucrose IVPB 200 milliGRAM(s) IV Intermittent every 24 hours  lactulose Syrup 10 Gram(s) Oral at bedtime  levothyroxine 25 MICROGram(s) Oral <User Schedule>  levothyroxine 50 MICROGram(s) Oral <User Schedule>  methotrexate (Non - oncologic) 10 milliGRAM(s) Oral every week  metoprolol succinate ER 25 milliGRAM(s) Oral daily  midodrine. 10 milliGRAM(s) Oral <User Schedule>  pantoprazole    Tablet 40 milliGRAM(s) Oral before breakfast  polyethylene glycol 3350 17 Gram(s) Oral two times a day  prasugrel 10 milliGRAM(s) Oral daily  senna 2 Tablet(s) Oral at bedtime  sodium chloride 3%. 150 milliLiter(s) (50 mL/Hr) IV Continuous <Continuous>  sodium chloride 3%. 150 milliLiter(s) (50 mL/Hr) IV Continuous <Continuous>  sodium chloride 3%. 150 milliLiter(s) (50 mL/Hr) IV Continuous <Continuous>  sodium chloride 3%. 150 milliLiter(s) (50 mL/Hr) IV Continuous <Continuous>    MEDICATIONS  (PRN):  acetaminophen     Tablet .. 325 milliGRAM(s) Oral every 4 hours PRN Moderate Pain (4 - 6)  dextrose Oral Gel 15 Gram(s) Oral once PRN Blood Glucose LESS THAN 70 milliGRAM(s)/deciliter  oxyCODONE    IR 10 milliGRAM(s) Oral four times a day PRN Moderate Pain (4 - 6)    PRESENT SYMPTOMS: [ ]Unable to obtain due to poor mentation   Source if other than patient:  [ ]Family   [ ]Team       Pain: [ x]yes [ ]no  QOL impact - significant   Location -     chest, arms                Aggravating factors - pressure   Quality - sharp throbbing   Radiation - no  Timing- constant   Severity (0-10 scale): 10/10   Minimal acceptable level (0-10 scale): 4/10      Dyspnea:                           [ x]None[ ]Mild [ ]Moderate [ ]Severe   Anxiety:                             [ ]None[ ]Mild [ ]Moderate [ ]Severe   Fatigue:                             [ ]None[ ]Mild [ ]Moderate [ ]Severe   Nausea:                             [ ]None[ ]Mild [ ]Moderate [ ]Severe   Loss of appetite:              [ ]None[ ]Mild [ ]Moderate [ ]Severe   Constipation:                    [ ]None[ ]Mild [ ]Moderate [ ]Severe    Other Symptoms:  [ x]All other review of systems negative     Palliative Performance Status Version 2:         %    http://npcrc.org/files/news/palliative_performance_scale_ppsv2.pdf  PHYSICAL EXAM:  Vital Signs Last 24 Hrs  T(C): 35 (25 May 2023 12:19), Max: 36.2 (24 May 2023 13:51)  T(F): 95 (25 May 2023 12:19), Max: 97.1 (24 May 2023 13:51)  HR: 81 (25 May 2023 12:19) (77 - 111)  BP: 100/61 (25 May 2023 12:19) (100/61 - 113/62)  BP(mean): --  RR: 18 (25 May 2023 12:19) (18 - 18)  SpO2: 91% (25 May 2023 04:27) (91% - 91%)     I&O's Summary    24 May 2023 07:01  -  25 May 2023 07:00  --------------------------------------------------------  IN: 225 mL / OUT: 4400 mL / NET: -4175 mL        GENERAL:  [ ] No acute distress [ ]Lethargic  [ ]Unarousable  [ ]Verbal  [ ]Non-Verbal [ ]Cachexia    BEHAVIORAL/PSYCH:  [x ]Alert and Oriented x  2[ x] Anxiety [ x] Delirium [ ] Agitation [ ] Calm   EYES: [ ] No scleral icterus [ ] Scleral icterus [ ] Closed  ENMT:  [ ]Dry mouth  [ ]No external oral lesions [ ] No external ear or nose lesions  CARDIOVASCULAR:  [ ]Regular [ ]Irregular [ ]Tachy [ ]Not Tachy  [ ]Clifton [ ] Edema [ ] No edema  PULMONARY:  [ ]Tachypnea  [ ]Audible excessive secretions [ x] No labored breathing [ ] labored breathing  GASTROINTESTINAL: [ x]Soft  [ ]Distended  [ ]Not distended [ ]Non tender [ ]Tender  MUSCULOSKELETAL: [ ]No clubbing [ ] clubbing  [ ] No cyanosis [ ] cyanosis  NEUROLOGIC: [ ]No focal deficits  [x ]Follows commands  [ ]Does not follow commands  [ ]Cognitive impairment  [ ]Dysphagia  [ ]Dysarthria  [ ]Paresis   SKIN: [ ] Jaundiced [x ] Non-jaundiced [ ]Rash [ ]No Rash [ ] Warm [ x] Multiple small sores   MISC/LINES: [ ] ET tube [ ] Trach [ ]NGT/OGT [ ]PEG [ x]Loja    CRITICAL CARE:  [ ] Shock Present  [ ]Septic [ ]Cardiogenic [ ]Neurologic [ ]Hypovolemic  [ ]  Vasopressors [ ]  Inotropes   [ ]Respiratory failure present [ ]Mechanical ventilation [ ]Non-invasive ventilatory support [ ]High flow  [ ]Acute  [ ]Chronic [ ]Hypoxic  [ ]Hypercarbic [ ]Other  [ ]Other organ failure     LABS: reviewed by me                        9.8    8.47  )-----------( 249      ( 25 May 2023 05:46 )             35.0   05-25    142  |  100  |  36<H>  ----------------------------<  94  4.3   |  30  |  1.5    Ca    8.6      25 May 2023 05:46  Mg     2.0     05-25    TPro  6.3  /  Alb  3.2<L>  /  TBili  1.2  /  DBili  x   /  AST  28  /  ALT  30  /  AlkPhos  115  05-25        RADIOLOGY & ADDITIONAL STUDIES: reviewed by me    EKG: reviewed by me      REFERRALS:   [ ]Chaplaincy  [ ]Hospice  [ ]Child Life  [ x]Social Work  [ x]Case management [ ]Holistic Therapy     Patient discussed with primary medical team MD  Palliative care education provided to patient and/or family    Goals of Care Document:    HPI:  67-year-old male w/ HFrEF 15-20% s/p ICD, decreased RV function, mild MR/TR, DM type I w/ neuropathy and hx hypoglycemia , DL, HTN, CAD, hx MI, s/p CABG, s/p stent (2018), hx in-stent thrombosis while on Plavix, PAD s/p 3 LLE stents, TIAGO (needs CPAP auto-regulating pressure 10-16, patient doesn't use it due to claustrophobia), Psoriasis, R-AKA presenting to ED s/p fall. He states he was lying in bed at home when he fell asleep and fell off the bed, and complains of generalized headache neck pain, non-radiating, no alleviating or aggravating factors, associated with right anterior chest wall pain. Denies LOC,  fevers, chills, nausea, vomiting, dizziness, abdominal pain, shortness of breath. States he had TDAP recently. Pt was not down for long as wife was upstairs when he fell off the bed and called EMS right away.     Interval History    - Appears in some emotional distress, and complaining of pain.     ADVANCE DIRECTIVES:    [ x] Full Code [ ] DNR  MOLST  [ ]  Living Will  [ ]   DECISION MAKER(s):  [x ] Health Care Proxy(s)  [x ] Surrogate(s)  [ ] Guardian           Name(s): Phone Number(s):    Answers: Emergency Contact Name Raine,  Answers: Emergency Contact Phone # [phone],  Answers: Emergency Contact Relationship wife    BASELINE (I)ADL(s) (prior to admission):  Ceiba: [ ]Total  [ ] Moderate [x ]Dependent    Allergies    ACE inhibitors (Rash)  Entresto (Swelling)  Atorvastatin Calcium (Unknown)  Bactrim (Unknown)  Plavix (Unknown)    Intolerances    MEDICATIONS  (STANDING):  aspirin enteric coated 81 milliGRAM(s) Oral daily  buMETAnide Injectable 2 milliGRAM(s) IV Push every 12 hours  chlorhexidine 2% Cloths 1 Application(s) Topical daily  dextrose 5% + sodium chloride 0.9%. 1000 milliLiter(s) (50 mL/Hr) IV Continuous <Continuous>  dextrose 5%. 1000 milliLiter(s) (100 mL/Hr) IV Continuous <Continuous>  dextrose 5%. 1000 milliLiter(s) (50 mL/Hr) IV Continuous <Continuous>  dextrose 50% Injectable 25 Gram(s) IV Push once  dextrose 50% Injectable 12.5 Gram(s) IV Push once  dextrose 50% Injectable 25 Gram(s) IV Push once  diazepam    Tablet 1 milliGRAM(s) Oral at bedtime  DULoxetine 30 milliGRAM(s) Oral <User Schedule>  folic acid 1 milliGRAM(s) Oral <User Schedule>  glucagon  Injectable 1 milliGRAM(s) IntraMuscular once  heparin   Injectable 5000 Unit(s) SubCutaneous every 12 hours  hydrocortisone 2.5% Cream 1 Application(s) Topical two times a day  insulin glargine Injectable (LANTUS) 12 Unit(s) SubCutaneous at bedtime  insulin lispro (ADMELOG) corrective regimen sliding scale   SubCutaneous three times a day before meals  iron sucrose IVPB 200 milliGRAM(s) IV Intermittent every 24 hours  lactulose Syrup 10 Gram(s) Oral at bedtime  levothyroxine 25 MICROGram(s) Oral <User Schedule>  levothyroxine 50 MICROGram(s) Oral <User Schedule>  methotrexate (Non - oncologic) 10 milliGRAM(s) Oral every week  metoprolol succinate ER 25 milliGRAM(s) Oral daily  midodrine. 10 milliGRAM(s) Oral <User Schedule>  pantoprazole    Tablet 40 milliGRAM(s) Oral before breakfast  polyethylene glycol 3350 17 Gram(s) Oral two times a day  prasugrel 10 milliGRAM(s) Oral daily  senna 2 Tablet(s) Oral at bedtime  sodium chloride 3%. 150 milliLiter(s) (50 mL/Hr) IV Continuous <Continuous>  sodium chloride 3%. 150 milliLiter(s) (50 mL/Hr) IV Continuous <Continuous>  sodium chloride 3%. 150 milliLiter(s) (50 mL/Hr) IV Continuous <Continuous>  sodium chloride 3%. 150 milliLiter(s) (50 mL/Hr) IV Continuous <Continuous>    MEDICATIONS  (PRN):  acetaminophen     Tablet .. 325 milliGRAM(s) Oral every 4 hours PRN Moderate Pain (4 - 6)  dextrose Oral Gel 15 Gram(s) Oral once PRN Blood Glucose LESS THAN 70 milliGRAM(s)/deciliter  oxyCODONE    IR 10 milliGRAM(s) Oral four times a day PRN Moderate Pain (4 - 6)    PRESENT SYMPTOMS: [ ]Unable to obtain due to poor mentation   Source if other than patient:  [ ]Family   [ ]Team       Pain: [ x]yes [ ]no  QOL impact - significant   Location -     chest, arms                Aggravating factors - pressure   Quality - sharp throbbing   Radiation - no  Timing- constant   Severity (0-10 scale): 10/10   Minimal acceptable level (0-10 scale): 4/10      Dyspnea:                           [ x]None[ ]Mild [ ]Moderate [ ]Severe   Anxiety:                             [ ]None[ ]Mild [ ]Moderate [ ]Severe   Fatigue:                             [ ]None[ ]Mild [ ]Moderate [ ]Severe   Nausea:                             [ ]None[ ]Mild [ ]Moderate [ ]Severe   Loss of appetite:              [ ]None[ ]Mild [ ]Moderate [ ]Severe   Constipation:                    [ ]None[ ]Mild [ ]Moderate [ ]Severe    Other Symptoms:  [ x]All other review of systems negative     Palliative Performance Status Version 2:         %    http://npcrc.org/files/news/palliative_performance_scale_ppsv2.pdf  PHYSICAL EXAM:  Vital Signs Last 24 Hrs  T(C): 35 (25 May 2023 12:19), Max: 36.2 (24 May 2023 13:51)  T(F): 95 (25 May 2023 12:19), Max: 97.1 (24 May 2023 13:51)  HR: 81 (25 May 2023 12:19) (77 - 111)  BP: 100/61 (25 May 2023 12:19) (100/61 - 113/62)  BP(mean): --  RR: 18 (25 May 2023 12:19) (18 - 18)  SpO2: 91% (25 May 2023 04:27) (91% - 91%)     I&O's Summary    24 May 2023 07:01  -  25 May 2023 07:00  --------------------------------------------------------  IN: 225 mL / OUT: 4400 mL / NET: -4175 mL        GENERAL:  [ ] No acute distress [ ]Lethargic  [ ]Unarousable  [ ]Verbal  [ ]Non-Verbal [ ]Cachexia    BEHAVIORAL/PSYCH:  [x ]Alert and Oriented x  2[ x] Anxiety [ x] Delirium [ ] Agitation [ ] Calm   EYES: [ ] No scleral icterus [ ] Scleral icterus [ ] Closed  ENMT:  [ ]Dry mouth  [ ]No external oral lesions [ ] No external ear or nose lesions  CARDIOVASCULAR:  [ ]Regular [ ]Irregular [ ]Tachy [ ]Not Tachy  [ ]Clifton [ ] Edema [ ] No edema  PULMONARY:  [ ]Tachypnea  [ ]Audible excessive secretions [ x] No labored breathing [ ] labored breathing  GASTROINTESTINAL: [ x]Soft  [ ]Distended  [ ]Not distended [ ]Non tender [ ]Tender  MUSCULOSKELETAL: [ ]No clubbing [ ] clubbing  [ ] No cyanosis [ ] cyanosis  NEUROLOGIC: [ ]No focal deficits  [x ]Follows commands  [ ]Does not follow commands  [ ]Cognitive impairment  [ ]Dysphagia  [ ]Dysarthria  [ ]Paresis   SKIN: [ ] Jaundiced [x ] Non-jaundiced [ ]Rash [ ]No Rash [ ] Warm [ x] Multiple small sores   MISC/LINES: [ ] ET tube [ ] Trach [ ]NGT/OGT [ ]PEG [ x]Loja    CRITICAL CARE:  [ ] Shock Present  [ ]Septic [ ]Cardiogenic [ ]Neurologic [ ]Hypovolemic  [ ]  Vasopressors [ ]  Inotropes   [ ]Respiratory failure present [ ]Mechanical ventilation [ ]Non-invasive ventilatory support [ ]High flow  [ ]Acute  [ ]Chronic [ ]Hypoxic  [ ]Hypercarbic [ ]Other  [ ]Other organ failure     LABS: reviewed by me                        9.8    8.47  )-----------( 249      ( 25 May 2023 05:46 )             35.0   05-25    142  |  100  |  36<H>  ----------------------------<  94  4.3   |  30  |  1.5    Ca    8.6      25 May 2023 05:46  Mg     2.0     05-25    TPro  6.3  /  Alb  3.2<L>  /  TBili  1.2  /  DBili  x   /  AST  28  /  ALT  30  /  AlkPhos  115  05-25        RADIOLOGY & ADDITIONAL STUDIES: reviewed by me    EKG: reviewed by me      REFERRALS:   [ ]Chaplaincy  [ ]Hospice  [ ]Child Life  [ x]Social Work  [ x]Case management [ ]Holistic Therapy     Patient discussed with primary medical team MD  Palliative care education provided to patient and/or family    Goals of Care Document:

## 2023-05-25 NOTE — PROGRESS NOTE ADULT - ASSESSMENT
67-year-old male w/ HFrEF 15-20% s/p ICD, decreased RV function, mild MR/TR, DM type I w/ neuropathy and hx hypoglycemia , DL, HTN, CAD, hx MI, s/p CABG, s/p stent (2018), hx in-stent thrombosis while on Plavix, PAD s/p 3 LLE stents, TIAGO (needs CPAP auto-regulating pressure 10-16, patient doesn't use it due to claustrophobia), Psoriasis, R-AKA presenting to ED s/p fall. He states he was lying in bed at home when he fell asleep and fell off the bed. Found to have moises and mild elevation of trop     1. Mechanical Fall   -No CT evidence for acute traumatic injury within the chest, abdomen or pelvis  -No evidence of acute intracranial pathology. Stable exam since 2023.  -No evidence of acute cervical spine fracture or subluxation.  -Vitals wnl   -PT eval: pending      2.Likely Acute systolic CHF complicated by MOISES (cardiorenal) and Troponinemia  * pt denies chest pain but report orthopnea  - Telemetry            - ECG :paced rythm     -CXR: congestion with BNP 6000   - Cont asa/prasugrel   - Cont metoprolol  - has allergy to ACE and Entresto   - decrease Midodrine to 10mg BID  - Loja for strict I&O   - Trend trop   (detectable likely demand ischemia in the setting of chf)   - CT chest notable for Moderate to large bilateral pleural effusions, right greater than left. Small to moderate volume abdominopelvic ascites.  - Cardio consult appreciated >> Lasix, HF consult  - Nephro consult appreaciated  - HF consult apprecaited >> Bumex 2mg IV BID with 3% hypertonic saline, pt tolerating the Bumex well, BP under control  - Ferritin 53 >> start venofer 200mg for 5 days as per HF    3.  DM type 1 complicated by hypoglycemia  episodes of hypoglycemia at home  - Insulin protocol lantus and nutritional insulin was decreased.     4.Hypothyroid   -c/w  synthroid    5. Mild hyperkalemia (resolved)    6.Recent dx of RA   * pt was dx with RA due to upper ext arterial ulcer?   - Cont Methotrexate (last dose was 2023)     # DVT prophylaxis - lovenox   #R-LE non-weight bearing  #carb-consistent/dash diet  #full code (discussed GOC with pt on ), Palliative team consult appreciated    pendin) clinical improvement, fluid overload improvement;  2) psych consult  3) HF f/u

## 2023-05-25 NOTE — PROGRESS NOTE ADULT - SUBJECTIVE AND OBJECTIVE BOX
FLOWER MARISCAL 67y Male  MRN#: [id number]   Hospital Day: 7d    SUBJECTIVE  Patient is a 67y old Male who presents with a chief complaint of Mechanical fall (25 May 2023 12:30)  Currently admitted to medicine with the primary diagnosis of Fall      INTERVAL HPI AND OVERNIGHT EVENTS:  Patient was examined and seen at bedside. This morning he is resting comfortably in bed. No issues or overnight events.    OBJECTIVE  PAST MEDICAL & SURGICAL HISTORY  Status post percutaneous transluminal coronary angioplasty  2 stents    Atherosclerosis of coronary artery  CAD (coronary artery disease)    Osteoarthritis    HLD (hyperlipidemia)    Diabetes  on insulin pump    CHF (congestive heart failure)  EF ~ 25%    History of celiac disease    Diverticulitis    STEMI (ST elevation myocardial infarction)    Diabetic neuropathy  Hands and Feet    Anxiety and depression    Other postprocedural status  Fixation hardware in foot LEFT    Stented coronary artery  10/18 heart attack  INFERIOR WALL MI    S/P CABG x 1  2018    S/P TKR (total knee replacement), right  with infection Mrsa   per pt he was cleared from MRSA infection    Surgery, elective  right knee wound debridement    History of open reduction and internal fixation (ORIF) procedure  right hip    H/O shoulder surgery  right    AICD (automatic cardioverter/defibrillator) present  St Kali    Cholecystostomy care  drain inserted 2020 & removed 4 months ago    History of tonsillectomy    History of hip replacement, total, right    Elective surgery  plastic surgery Left shin      ALLERGIES:  ACE inhibitors (Rash)  Entresto (Swelling)  Atorvastatin Calcium (Unknown)  Bactrim (Unknown)  Plavix (Unknown)    MEDICATIONS:  STANDING MEDICATIONS  aspirin enteric coated 81 milliGRAM(s) Oral daily  buMETAnide Injectable 2 milliGRAM(s) IV Push every 12 hours  chlorhexidine 2% Cloths 1 Application(s) Topical daily  dextrose 5% + sodium chloride 0.9%. 1000 milliLiter(s) IV Continuous <Continuous>  dextrose 5%. 1000 milliLiter(s) IV Continuous <Continuous>  dextrose 5%. 1000 milliLiter(s) IV Continuous <Continuous>  dextrose 50% Injectable 25 Gram(s) IV Push once  dextrose 50% Injectable 12.5 Gram(s) IV Push once  dextrose 50% Injectable 25 Gram(s) IV Push once  diazepam    Tablet 1 milliGRAM(s) Oral at bedtime  DULoxetine 30 milliGRAM(s) Oral <User Schedule>  folic acid 1 milliGRAM(s) Oral <User Schedule>  glucagon  Injectable 1 milliGRAM(s) IntraMuscular once  heparin   Injectable 5000 Unit(s) SubCutaneous every 12 hours  hydrocortisone 2.5% Cream 1 Application(s) Topical two times a day  insulin glargine Injectable (LANTUS) 12 Unit(s) SubCutaneous at bedtime  insulin lispro (ADMELOG) corrective regimen sliding scale   SubCutaneous three times a day before meals  iron sucrose IVPB 200 milliGRAM(s) IV Intermittent every 24 hours  lactulose Syrup 10 Gram(s) Oral at bedtime  levothyroxine 25 MICROGram(s) Oral <User Schedule>  levothyroxine 50 MICROGram(s) Oral <User Schedule>  methotrexate (Non - oncologic) 10 milliGRAM(s) Oral every week  metoprolol succinate ER 25 milliGRAM(s) Oral daily  midodrine. 5 milliGRAM(s) Oral <User Schedule>  pantoprazole    Tablet 40 milliGRAM(s) Oral before breakfast  polyethylene glycol 3350 17 Gram(s) Oral two times a day  prasugrel 10 milliGRAM(s) Oral daily  senna 2 Tablet(s) Oral at bedtime  sodium chloride 3%. 150 milliLiter(s) IV Continuous <Continuous>  sodium chloride 3%. 150 milliLiter(s) IV Continuous <Continuous>  sodium chloride 3%. 150 milliLiter(s) IV Continuous <Continuous>  sodium chloride 3%. 150 milliLiter(s) IV Continuous <Continuous>    PRN MEDICATIONS  acetaminophen     Tablet .. 325 milliGRAM(s) Oral every 4 hours PRN  dextrose Oral Gel 15 Gram(s) Oral once PRN  oxyCODONE    IR 10 milliGRAM(s) Oral four times a day PRN      VITAL SIGNS: Last 24 Hours  T(C): 35 (25 May 2023 12:19), Max: 36.2 (24 May 2023 13:51)  T(F): 95 (25 May 2023 12:19), Max: 97.1 (24 May 2023 13:51)  HR: 81 (25 May 2023 12:19) (77 - 111)  BP: 100/61 (25 May 2023 12:19) (100/61 - 113/62)  BP(mean): --  RR: 18 (25 May 2023 12:19) (18 - 18)  SpO2: 91% (25 May 2023 04:27) (91% - 91%)    LABS:                        9.8    8.47  )-----------( 249      ( 25 May 2023 05:46 )             35.0     05-25    142  |  100  |  36<H>  ----------------------------<  94  4.3   |  30  |  1.5    Ca    8.6      25 May 2023 05:46  Mg     2.0     05-25    TPro  6.3  /  Alb  3.2<L>  /  TBili  1.2  /  DBili  x   /  AST  28  /  ALT  30  /  AlkPhos  115  05-25          Lactate, Blood: 1.1 mmol/L (05-24-23 @ 16:00)      FLOWER MARISCAL 67y Male  MRN#: [id number]   Hospital Day: 7d    SUBJECTIVE  Patient is a 67y old Male who presents with a chief complaint of Mechanical fall (25 May 2023 12:30)  Currently admitted to medicine with the primary diagnosis of Fall      INTERVAL HPI AND OVERNIGHT EVENTS:  Patient was examined and seen at bedside. This morning he is resting comfortably in bed. No issues or overnight events.    OBJECTIVE  PAST MEDICAL & SURGICAL HISTORY  Status post percutaneous transluminal coronary angioplasty  2 stents    Atherosclerosis of coronary artery  CAD (coronary artery disease)    Osteoarthritis    HLD (hyperlipidemia)    Diabetes  on insulin pump    CHF (congestive heart failure)  EF ~ 25%    History of celiac disease    Diverticulitis    STEMI (ST elevation myocardial infarction)    Diabetic neuropathy  Hands and Feet    Anxiety and depression    Other postprocedural status  Fixation hardware in foot LEFT    Stented coronary artery  10/18 heart attack  INFERIOR WALL MI    S/P CABG x 1  2018    S/P TKR (total knee replacement), right  with infection Mrsa   per pt he was cleared from MRSA infection    Surgery, elective  right knee wound debridement    History of open reduction and internal fixation (ORIF) procedure  right hip    H/O shoulder surgery  right    AICD (automatic cardioverter/defibrillator) present  St Kali    Cholecystostomy care  drain inserted 2020 & removed 4 months ago    History of tonsillectomy    History of hip replacement, total, right    Elective surgery  plastic surgery Left shin      ALLERGIES:  ACE inhibitors (Rash)  Entresto (Swelling)  Atorvastatin Calcium (Unknown)  Bactrim (Unknown)  Plavix (Unknown)    MEDICATIONS:  STANDING MEDICATIONS  aspirin enteric coated 81 milliGRAM(s) Oral daily  buMETAnide Injectable 2 milliGRAM(s) IV Push every 12 hours  chlorhexidine 2% Cloths 1 Application(s) Topical daily  dextrose 5% + sodium chloride 0.9%. 1000 milliLiter(s) IV Continuous <Continuous>  dextrose 5%. 1000 milliLiter(s) IV Continuous <Continuous>  dextrose 5%. 1000 milliLiter(s) IV Continuous <Continuous>  dextrose 50% Injectable 25 Gram(s) IV Push once  dextrose 50% Injectable 12.5 Gram(s) IV Push once  dextrose 50% Injectable 25 Gram(s) IV Push once  diazepam    Tablet 1 milliGRAM(s) Oral at bedtime  DULoxetine 30 milliGRAM(s) Oral <User Schedule>  folic acid 1 milliGRAM(s) Oral <User Schedule>  glucagon  Injectable 1 milliGRAM(s) IntraMuscular once  heparin   Injectable 5000 Unit(s) SubCutaneous every 12 hours  hydrocortisone 2.5% Cream 1 Application(s) Topical two times a day  insulin glargine Injectable (LANTUS) 12 Unit(s) SubCutaneous at bedtime  insulin lispro (ADMELOG) corrective regimen sliding scale   SubCutaneous three times a day before meals  iron sucrose IVPB 200 milliGRAM(s) IV Intermittent every 24 hours  lactulose Syrup 10 Gram(s) Oral at bedtime  levothyroxine 25 MICROGram(s) Oral <User Schedule>  levothyroxine 50 MICROGram(s) Oral <User Schedule>  methotrexate (Non - oncologic) 10 milliGRAM(s) Oral every week  metoprolol succinate ER 25 milliGRAM(s) Oral daily  midodrine. 5 milliGRAM(s) Oral <User Schedule>  pantoprazole    Tablet 40 milliGRAM(s) Oral before breakfast  polyethylene glycol 3350 17 Gram(s) Oral two times a day  prasugrel 10 milliGRAM(s) Oral daily  senna 2 Tablet(s) Oral at bedtime  sodium chloride 3%. 150 milliLiter(s) IV Continuous <Continuous>  sodium chloride 3%. 150 milliLiter(s) IV Continuous <Continuous>  sodium chloride 3%. 150 milliLiter(s) IV Continuous <Continuous>  sodium chloride 3%. 150 milliLiter(s) IV Continuous <Continuous>    PRN MEDICATIONS  acetaminophen     Tablet .. 325 milliGRAM(s) Oral every 4 hours PRN  dextrose Oral Gel 15 Gram(s) Oral once PRN  oxyCODONE    IR 10 milliGRAM(s) Oral four times a day PRN      VITAL SIGNS: Last 24 Hours  T(C): 35 (25 May 2023 12:19), Max: 36.2 (24 May 2023 13:51)  T(F): 95 (25 May 2023 12:19), Max: 97.1 (24 May 2023 13:51)  HR: 81 (25 May 2023 12:19) (77 - 111)  BP: 100/61 (25 May 2023 12:19) (100/61 - 113/62)  BP(mean): --  RR: 18 (25 May 2023 12:19) (18 - 18)  SpO2: 91% (25 May 2023 04:27) (91% - 91%)    LABS:                        9.8    8.47  )-----------( 249      ( 25 May 2023 05:46 )             35.0     05-25    142  |  100  |  36<H>  ----------------------------<  94  4.3   |  30  |  1.5    Ca    8.6      25 May 2023 05:46  Mg     2.0     05-25    TPro  6.3  /  Alb  3.2<L>  /  TBili  1.2  /  DBili  x   /  AST  28  /  ALT  30  /  AlkPhos  115  05-25          Lactate, Blood: 1.1 mmol/L (05-24-23 @ 16:00)        Physical Exam:  CONSTITUTIONAL: No acute distress, alert and following commands  HEAD: Atraumatic, normocephalic  EYES: EOM intact, PERRLA, conjunctiva and sclera clear  PULMONARY: clear  CARDIOVASCULAR: Regular rate and rhythm  GASTROINTESTINAL: Soft, non-tender, non-distended; bowel sounds present  NEUROLOGY: non-focal, AxO= 3

## 2023-05-25 NOTE — PROGRESS NOTE ADULT - PROBLEM SELECTOR PLAN 3
Patient and spouse have been medically updated about pt's prognosis but have not been able to have a conversation about realistic goals of care  - Palliative care available for further discussion if patient and spouse agreeable. Will follow Patient and spouse have been medically updated about pt's prognosis but have not been able to have a conversation about realistic goals of care  - SEE [facility] NOTE from 5/25

## 2023-05-25 NOTE — PROGRESS NOTE ADULT - SUBJECTIVE AND OBJECTIVE BOX
Date of Admission: 23    Interval History: Patient seen and examined in bed, drowsy. Spouse present at bedside. No acute overnight events reported, no new complaints at this time. Wife expressed concern about patient's current health status- support rendered. Remains on Bumex IVP, 4.4L 24hr UOP. Kidney function continuing to improve.     CHIEF COMPLAINT: Patient is a 67y old  Male who presents with a chief complaint of Mechanical fall (22 May 2023 12:56)    HISTORY OF PRESENT ILLNESS: 67-year-old male w/ HFrEF 15-20% s/p ICD, decreased RV function, mild MR/TR, DM type I w/ neuropathy and hx hypoglycemia , DL, HTN, CAD, hx MI, s/p CABG, s/p stent (2018), hx in-stent thrombosis while on Plavix, PAD s/p 3 LLE stents, TIAGO (needs CPAP auto-regulating pressure 10-16, patient doesn't use it due to claustrophobia), Psoriasis, R-AKA presenting to ED s/p fall. He states he was lying in bed at home when he fell asleep and fell off the bed, and complains of generalized headache neck pain, non-radiating, no alleviating or aggravating factors, associated with right anterior chest wall pain. Denies LOC,  fevers, chills, nausea, vomiting, dizziness, abdominal pain, shortness of breath. States he had TDAP recently. Pt was not down for long as wife was upstairs when he fell off the bed and called EMS right away.   Labs significant for cr 1.8 (baseline ~ 1.2)  trop 0.10 (18 May 2023 14:16)    Patient reports having a known history of low EF, follows w/ Dr. Foote (primary cardiologist) and Dr. Mejia (EP). Stated he takes Lasix 20mg ~ daily at home, recently increased to 40mg daily as he has been experiencing progressively worsening SOB x2 weeks. Admits to eating a moderate Na diet at home. Endorses compliance with home GDMT. States he has been mostly wheelchair bound x1 year 2/2 to musculoskeletal issues.        PAST MEDICAL & SURGICAL HISTORY:  Status post percutaneous transluminal coronary angioplasty  2 stents  Atherosclerosis of coronary artery  CAD (coronary artery disease)  Osteoarthritis  HLD (hyperlipidemia)  Diabetes  on insulin pump  CHF (congestive heart failure)  EF ~ 25%  History of celiac disease  Diverticulitis  STEMI (ST elevation myocardial infarction)  Diabetic neuropathy  Hands and Feet  Anxiety and depression  Other postprocedural status  Fixation hardware in foot LEFT  Stented coronary artery  10/18 heart attack  INFERIOR WALL MI  S/P CABG x 1  2018  S/P TKR (total knee replacement), right  with infection Mrsa  per pt he was cleared from MRSA infection  Surgery, elective  right knee wound debridement  History of open reduction and internal fixation (ORIF) procedure  right hip  H/O shoulder surgery  right  AICD (automatic cardioverter/defibrillator) present  St Kali  Cholecystostomy care  drain inserted  & removed 4 months ago  History of tonsillectomy  History of hip replacement, total, right  Elective surgery  plastic surgery Left shin      FAMILY HISTORY: Patient was adopted, family history unknown to him  SOCIAL HISTORY:  Denies smoking, alcohol, or drug use      Allergies  ACE inhibitors (Rash)  Entresto (Swelling)  Atorvastatin Calcium (Unknown)  Bactrim (Unknown)  Plavix (Unknown)    Intolerances      PHYSICAL EXAM:  T(C): 35 (25 May 2023 12:19), Max: 35.8 (24 May 2023 21:24)  T(F): 95 (25 May 2023 12:19), Max: 96.5 (24 May 2023 21:24)  HR: 81 (25 May 2023 12:19) (77 - 81)  BP: 100/61 (25 May 2023 12:19) (100/61 - 107/63)  BP(mean): --  RR: 18 (25 May 2023 12:19) (18 - 18)  SpO2: 91% (25 May 2023 04:27) (91% - 91%)      General Appearance: normal for age and gender. 	  Neck: JVP [id number]   Eyes: Extra Ocular muscles intact.   Cardiovascular: regular rate and rhythm S1 S2, No edema  Respiratory: decreased B/L  Psychiatry: Drowsy, oriented x 2-3, poor historian  Gastrointestinal:  Soft, Non-tender  Skin/Integumentary: No rashes, No ecchymoses, No cyanosis	  Neurologic: Non-focal  Musculoskeletal/ extremities: R AKA, No clubbing, cyanosis or edema  Vascular: Peripheral pulses palpable 2+ B/L UE         LABS:	 	    CBC Full  -  ( 25 May 2023 05:46 )  WBC Count : 8.47 K/uL  RBC Count : 5.00 M/uL  Hemoglobin : 9.8 g/dL  Hematocrit : 35.0 %  Platelet Count - Automated : 249 K/uL  Mean Cell Volume : 70.0 fL  Mean Cell Hemoglobin : 19.6 pg  Mean Cell Hemoglobin Concentration : 28.0 g/dL  Auto Neutrophil # : 6.52 K/uL  Auto Lymphocyte # : 0.60 K/uL  Auto Monocyte # : 0.97 K/uL  Auto Eosinophil # : 0.29 K/uL  Auto Basophil # : 0.06 K/uL  Auto Neutrophil % : 76.9 %  Auto Lymphocyte % : 7.1 %  Auto Monocyte % : 11.5 %  Auto Eosinophil % : 3.4 %  Auto Basophil % : 0.7 %      Comprehensive Metabolic Panel (23 @ 05:46)    Sodium, Serum: 142 mmol/L   Potassium, Serum: 4.3 mmol/L   Chloride, Serum: 100 mmol/L   Carbon Dioxide, Serum: 30 mmol/L   Anion Gap, Serum: 12 mmol/L   Blood Urea Nitrogen, Serum: 36 mg/dL   Creatinine, Serum: 1.5 mg/dL   Glucose, Serum: 94 mg/dL   Calcium, Total Serum: 8.6 mg/dL   Protein Total, Serum: 6.3 g/dL   Albumin, Serum: 3.2 g/dL   Bilirubin Total, Serum: 1.2 mg/dL   Alkaline Phosphatase, Serum: 115 U/L   Aspartate Aminotransferase (AST/SGOT): 28: Hemolyzed. Interpret with caution U/L   Alanine Aminotransferase (ALT/SGPT): 30 U/L   eGFR: 51: The estimated glomerular filtration rate (eGFR) is calculated using the   CKD-EPI creatinine equation, which does not have a coefficient for  race and is validated in individuals 18 years of age and older (N Engl J  Med ; [id number]). Creatinine-based eGFR may be inaccurate in  various situations including but not limited to extremes of muscle mass,  altered dietary protein intake, or medications that affect renal tubular  creatinine secretion. mL/min/1.73m2      CARDIAC MARKERS:  Pro-Brain Natriuretic Peptide: 9132 pg/mL (23 @ 07:01)        TELEMETRY EVENTS: 	    EC23    Ventricular Rate 79 BPM  Atrial Rate 79 BPM  P-R Interval 170 ms  QRS Duration 158 ms  Q-T Interval 480 ms  QTC Calculation(Bazett) 550 ms  P Axis 51 degrees  R Axis -35 degrees  T Axis 65 degrees    Diagnosis Line Atrial-sensed ventricular-paced rhythm  Abnormal ECG    Confirmed by London Santos ([id number]) on 2023 4:12:01 PM      PREVIOUS DIAGNOSTIC TESTING:    TTE 23    Summary:   1. Left ventricular ejection fraction, by visual estimation, is <20%.   2. Severely decreased global left ventricular systolic function.   3. Mildly increased LV wall thickness.   4. Severe concentric left ventricular hypertrophy.   5. Spectral Doppler shows restrictive pattern of left ventricular   myocardial filling (Grade III diastolic dysfunction).   6. Moderately enlarged right ventricle.   7. Moderately reduced RV systolic function.   8. Elevated mean left atrial pressure.   9. Moderately enlarged left atrium.  10.Moderate mitral valve regurgitation.  11. The mitral valve leaflets are tethered which is due to reduced   systolic function and elevated LVDP.  12. Moderate-severe tricuspid regurgitation.  13. Estimated pulmonary artery systolic pressure is 48.4 mmHg assuming a   right atrial pressure of 8 mmHg, which is consistent with mild pulmonary   hypertension.  14. Patient is in normal sinus rhythm.  15. Compared to the prior TTE dated 21, the patient's cardiomyopathy   has worsened.      Left Heart Catheterization: 18    IMPRESSIONS: There is significant single vessel native coronary artery  disease. Patent LIMA to LAD. No significant restenosis in RCA/OM1.    	    Home Medications:  aspirin 81 mg oral delayed release tablet: 1 tab(s) orally once a day (2023 02:35)  diazePAM 2 mg oral tablet: 0.5 tab(s) orally once a day (at bedtime) (2023 02:35)  DULoxetine 30 mg oral delayed release capsule: 1 cap(s) orally 3 times a day (2023 02:35)  insulin glargine 100 units/mL subcutaneous solution: 25 unit(s) subcutaneous once a day (at bedtime) (2023 02:35)  insulin glargine 100 units/mL subcutaneous solution: 40 microcurie subcutaneous once a day (2023 02:35)  insulin lispro 100 units/mL injectable solution: if your finger stick is 150-199 please inject 2 units  if your finger stick is 200-250 please inject 4 units  if your finger stick is 251-300 please inject 6 units  if your finger stick is 301-350 please inject 8 units  if your finger stick is more than 350 please call your physician    (2023 02:35)  levothyroxine 25 mcg (0.025 mg) oral tablet: 1 tab(s) orally 6 times a week (2023 02:35)  levothyroxine 50 mcg (0.05 mg) oral tablet: 1 tab(s) orally once a week (2023 02:35)  metoprolol succinate 25 mg oral tablet, extended release: 1 tab(s) orally once a day (2023 02:35)  Multiple Vitamins oral tablet: 1 tab(s) orally once a day (:35)  pantoprazole 40 mg oral delayed release tablet: 1 tab(s) orally once a day (before a meal) (:35)  prasugrel 10 mg oral tablet: 1 tab(s) orally once a day (:35)  rosuvastatin 40 mg oral tablet: 1 tab(s) orally once a day (2023 02:35)  spironolactone 25 mg oral tablet: 1 tab(s) orally once a day (2023 02:35)        MEDICATIONS  (STANDING):  aspirin enteric coated 81 milliGRAM(s) Oral daily  dextrose 5% + sodium chloride 0.9%. 1000 milliLiter(s) (50 mL/Hr) IV Continuous <Continuous>  dextrose 5%. 1000 milliLiter(s) (100 mL/Hr) IV Continuous <Continuous>  dextrose 5%. 1000 milliLiter(s) (50 mL/Hr) IV Continuous <Continuous>  dextrose 50% Injectable 25 Gram(s) IV Push once  dextrose 50% Injectable 12.5 Gram(s) IV Push once  dextrose 50% Injectable 25 Gram(s) IV Push once  diazepam    Tablet 1 milliGRAM(s) Oral at bedtime  DULoxetine 30 milliGRAM(s) Oral <User Schedule>  furosemide   Injectable 20 milliGRAM(s) IV Push two times a day  glucagon  Injectable 1 milliGRAM(s) IntraMuscular once  heparin   Injectable 5000 Unit(s) SubCutaneous every 12 hours  hydrocortisone 2.5% Cream 1 Application(s) Topical two times a day  insulin glargine Injectable (LANTUS) 12 Unit(s) SubCutaneous at bedtime  insulin lispro (ADMELOG) corrective regimen sliding scale   SubCutaneous three times a day before meals  lactulose Syrup 10 Gram(s) Oral at bedtime  levothyroxine 25 MICROGram(s) Oral <User Schedule>  levothyroxine 50 MICROGram(s) Oral <User Schedule>  metoprolol succinate ER 25 milliGRAM(s) Oral daily  midodrine. 10 milliGRAM(s) Oral three times a day  pantoprazole    Tablet 40 milliGRAM(s) Oral before breakfast  polyethylene glycol 3350 17 Gram(s) Oral two times a day  prasugrel 10 milliGRAM(s) Oral daily  senna 2 Tablet(s) Oral at bedtime    MEDICATIONS  (PRN):  dextrose Oral Gel 15 Gram(s) Oral once PRN Blood Glucose LESS THAN 70 milliGRAM(s)/deciliter

## 2023-05-26 NOTE — PROGRESS NOTE ADULT - ASSESSMENT
68 yo M PMHx chronic HFrEF 15-20% s/p ICD, decreased RV function, mild MR/TR, DM type I w/ neuropathy and hx hypoglycemia, DLD, HTN, CAD, hx MI, s/p CABG, s/p stent (2018), hx in-stent thrombosis while on Plavix, PAD s/p 3 LLE stents, TIAGO (needs CPAP auto-regulating pressure 10-16, patient doesn't use it due to claustrophobia), Psoriasis, R-AKA presenting to ED s/p fall. He states he was lying in bed at home when he fell asleep and fell off the bed. Found to have moises and mild elevation of trop       A/P:   Acute on Chronic HFrEF:   Patient with SOB, Pro-BNP 9100  CT chest and abdomen showed moderate tp large bilateral pleural effusions, right greater than left. Small to moderate volume abdominopelvic ascites.  Echo 5/24/23 showed LVEF <20%, mod MR, mod to severe TR.   Continue Bumex 2mg IV BID and Hypertonic saline 3% BID.   Continue Metoprolol. On Midodrine, wean off   had "anaphlyatic reaction" to ACE-I (presumably angioedema?) and has allergy to Entresto  Low sodium diet, fluid restriction 1.2L/day,     Delirium: likely from hospital stay and medical problem  Seen by psych,     CAD s/p PCI  Type 2 NSTEMI due to demand ischemia:   Troponin 0.09>0.10  EKG showed paced rhythm.   Continue Aspirin, Prasugrel, Metoprolol, add Lipitor 40mg po daily.    Mechanical Fall   No CT evidence for acute traumatic injury within the chest, abdomen or pelvis  Fall Precaution, PT follow up.     MOISES on CKD III  likely cardiorenal vs obstruction.   Cr increased to 2.6, now improving to 1.5, baseline 1.1  Kidney US:  showed Mild left hydronephrosis/dilated pelvis, unchanged. Left lower pole 0.5 cm nonobstructing calculus.  s/p Loja placement.     Iron deficiency anemia:   Iron studies reviewed Iron level 13, Iron sat 3%  Continue Venofer 200mg IV daily.     Transaminitis  Mild elevation, AST/ALT< resolved, likely congestive hepatopathy.     DM type 1 complicated by hypoglycemia  episodes of hypoglycemia at home  Continue Lantus and insulin sliding scale.     Hypothyroidism: continue Synthroid    Recent dx of RA   Cont Methotrexate weekly.     DVT prophylaxis - lovenox   full code    #Progress Note Handoff:  Pending (specify):  improving volume overload.   Family discussion: with his wife, update about echo result,   Disposition: Home vs SNF.

## 2023-05-26 NOTE — PROGRESS NOTE ADULT - PROBLEM SELECTOR PLAN 7
Likely from hospitalization? Underlying neurocognitive disorder   Behavioral Health Eval read and appreciated   - avoid polypharmacy, and frequently reorient  - Assist w all ADLS and 1:1 feeding

## 2023-05-26 NOTE — BH CONSULTATION LIAISON ASSESSMENT NOTE - NSBHREFERDETAILS_PSY_A_CORE_FT
recommended by palliative to have psych involvement for mental health evaluation Recommended by palliative to have psych involvement for mental health evaluation

## 2023-05-26 NOTE — BH CONSULTATION LIAISON ASSESSMENT NOTE - SUMMARY
66 y/o man with PMH of HFrEF 15-20% s/p ICD, decreased RV function, mild MR/TR, DM type I w/ neuropathy and hx hypoglycemia (3 episodes in past 2 weeks), DL, HTN, CAD s/p MI, s/p CABG, s/p stent (2018), hx in-stent thrombosis while on Plavix, PAD s/p 3 LLE stents (2 months prior to admission), TIAGO (needs CPAP auto-regulating pressure 10-16, patient doesn't use it due to claustrophobia), Psoriasis, s/p right AKA, MRSA bacteremia, left ankle surgery with fixation hardware and hypothyroid and admission last month for recurrent cellulitis complicated by acute over chronic CHF and hypoglycemia now presents with worsening shortness of breath over the past week (pt unable to weight himself due to right AKA). Consult was placed for psychiatry to perform a mental health evaluation.    On evaluation, the interview was very limited due to poor patient participation and then refusal. He denied any suicidal thoughts or history of attempts. He does not appear manic or psychotic on MSE; however, he appears to have difficulty with attention and staying awake. Based on collateral from palliative care team, there has been reports of waxing and waning consciousness. He also has a history of delirium during prior hospitalizations as well. It is likely that current presentation is due to delirium due to underlying medical condition.    - Given that patient is likely experiencing some confusion 2/2 delirium, it is recommended that wife/next of kin be included in medical discussions with the patient.  - Continue with treatment of reversible causes of delirium.   - Minimize polypharmacy and avoid benzodiazepines, anticholinergic medications, antihistamines, SSRI, SNRIs, opiates) - which may contribute to confusion/agitation.  - Provide strong environmental cues to maintain normal sleep/wake cycle; Daytime - frequent verbal engagement and reorientation, full light in room, encourage family visits, make sure patient has seeing eyeglasses/hearing aids; Nightime - reduce light noise, avoid non-essential procedures between midnight and 6AM.  66 y/o man with PMH of HFrEF 15-20% s/p ICD, decreased RV function, mild MR/TR, DM type I w/ neuropathy and hx hypoglycemia (3 episodes in past 2 weeks), DL, HTN, CAD s/p MI, s/p CABG, s/p stent (2018), hx in-stent thrombosis while on Plavix, PAD s/p 3 LLE stents (2 months prior to admission), TIAGO (needs CPAP auto-regulating pressure 10-16, patient doesn't use it due to claustrophobia), Psoriasis, s/p right AKA, MRSA bacteremia, left ankle surgery with fixation hardware and hypothyroid and admission last month for recurrent cellulitis complicated by acute over chronic CHF and hypoglycemia now presents with worsening shortness of breath over the past week (pt unable to weight himself due to right AKA). Consult was placed for psychiatry to perform a mental health evaluation.    On evaluation, the interview was very limited due to poor patient participation and then refusal. He denied any suicidal thoughts or history of attempts. He does not appear manic or psychotic on MSE; however, he appears to have difficulty with attention and staying awake. Based on collateral from palliative care team, there has been reports of waxing and waning consciousness. He also has a history of delirium during prior hospitalizations as well. It is likely that current presentation is due to delirium due to underlying medical condition.    - Given that patient is likely experiencing some confusion 2/2 delirium, it is recommended that wife/next of kin be included in medical discussions with the patient.  - Duloxetine may be contributing to daytime sedation, so recommend changing schedule so it is all given at bedtime (90mg QHS).  - Recommend changing from Valium to shorter-acting benzodiazepine such as Ativan 0.25mg at bedtime.  - Continue with treatment of reversible causes of delirium.   - Minimize polypharmacy and avoid benzodiazepines, anticholinergic medications, antihistamines, SSRI, SNRIs, opiates) - which may contribute to confusion/agitation.  - Provide strong environmental cues to maintain normal sleep/wake cycle; Daytime - frequent verbal engagement and reorientation, full light in room, encourage family visits, make sure patient has seeing eyeglasses/hearing aids; Nightime - reduce light noise, avoid non-essential procedures between midnight and 6AM.

## 2023-05-26 NOTE — PROGRESS NOTE ADULT - SUBJECTIVE AND OBJECTIVE BOX
FLOWER MARISCAL 67y Male  MRN#: [id number]   Hospital Day: 8d    SUBJECTIVE  Patient is a 67y old Male who presents with a chief complaint of Mechanical fall (25 May 2023 16:37)  Currently admitted to medicine with the primary diagnosis of Fall      INTERVAL HPI AND OVERNIGHT EVENTS:  Patient was examined and seen at bedside. This morning he is resting comfortably in bed. No issues or overnight events.    OBJECTIVE  PAST MEDICAL & SURGICAL HISTORY  Status post percutaneous transluminal coronary angioplasty  2 stents    Atherosclerosis of coronary artery  CAD (coronary artery disease)    Osteoarthritis    HLD (hyperlipidemia)    Diabetes  on insulin pump    CHF (congestive heart failure)  EF ~ 25%    History of celiac disease    Diverticulitis    STEMI (ST elevation myocardial infarction)    Diabetic neuropathy  Hands and Feet    Anxiety and depression    Other postprocedural status  Fixation hardware in foot LEFT    Stented coronary artery  10/18 heart attack  INFERIOR WALL MI    S/P CABG x 1  2018    S/P TKR (total knee replacement), right  with infection Mrsa   per pt he was cleared from MRSA infection    Surgery, elective  right knee wound debridement    History of open reduction and internal fixation (ORIF) procedure  right hip    H/O shoulder surgery  right    AICD (automatic cardioverter/defibrillator) present  [facility]    Cholecystostomy care  drain inserted 2020 & removed 4 months ago    History of tonsillectomy    History of hip replacement, total, right    Elective surgery  plastic surgery Left shin      ALLERGIES:  ACE inhibitors (Rash)  Entresto (Swelling)  Atorvastatin Calcium (Unknown)  Bactrim (Unknown)  Plavix (Unknown)    MEDICATIONS:  STANDING MEDICATIONS  aspirin enteric coated 81 milliGRAM(s) Oral daily  buMETAnide Injectable 2 milliGRAM(s) IV Push every 12 hours  chlorhexidine 2% Cloths 1 Application(s) Topical daily  dextrose 5% + sodium chloride 0.9%. 1000 milliLiter(s) IV Continuous <Continuous>  dextrose 5%. 1000 milliLiter(s) IV Continuous <Continuous>  dextrose 5%. 1000 milliLiter(s) IV Continuous <Continuous>  dextrose 50% Injectable 25 Gram(s) IV Push once  dextrose 50% Injectable 12.5 Gram(s) IV Push once  dextrose 50% Injectable 25 Gram(s) IV Push once  DULoxetine 30 milliGRAM(s) Oral <User Schedule>  folic acid 1 milliGRAM(s) Oral <User Schedule>  glucagon  Injectable 1 milliGRAM(s) IntraMuscular once  heparin   Injectable 5000 Unit(s) SubCutaneous every 12 hours  hydrocortisone 2.5% Cream 1 Application(s) Topical two times a day  insulin glargine Injectable (LANTUS) 12 Unit(s) SubCutaneous at bedtime  insulin lispro (ADMELOG) corrective regimen sliding scale   SubCutaneous three times a day before meals  iron sucrose IVPB 200 milliGRAM(s) IV Intermittent every 24 hours  lactulose Syrup 10 Gram(s) Oral at bedtime  levothyroxine 25 MICROGram(s) Oral <User Schedule>  levothyroxine 50 MICROGram(s) Oral <User Schedule>  methotrexate (Non - oncologic) 10 milliGRAM(s) Oral every week  metoprolol succinate ER 25 milliGRAM(s) Oral daily  midodrine. 5 milliGRAM(s) Oral <User Schedule>  pantoprazole    Tablet 40 milliGRAM(s) Oral before breakfast  polyethylene glycol 3350 17 Gram(s) Oral two times a day  prasugrel 10 milliGRAM(s) Oral daily  senna 2 Tablet(s) Oral at bedtime  sodium chloride 3%. 150 milliLiter(s) IV Continuous <Continuous>  sodium chloride 3%. 150 milliLiter(s) IV Continuous <Continuous>  sodium chloride 3%. 150 milliLiter(s) IV Continuous <Continuous>  sodium chloride 3%. 150 milliLiter(s) IV Continuous <Continuous>  sodium chloride 3%. 150 milliLiter(s) IV Continuous <Continuous>  sodium chloride 3%. 150 milliLiter(s) IV Continuous <Continuous>    PRN MEDICATIONS  acetaminophen     Tablet .. 325 milliGRAM(s) Oral every 4 hours PRN  dextrose Oral Gel 15 Gram(s) Oral once PRN  oxyCODONE    IR 10 milliGRAM(s) Oral four times a day PRN      VITAL SIGNS: Last 24 Hours  T(C): 35.4 (26 May 2023 05:32), Max: 36.4 (25 May 2023 20:23)  T(F): 95.7 (26 May 2023 05:32), Max: 97.6 (25 May 2023 20:23)  HR: 79 (26 May 2023 05:32) (79 - 84)  BP: 103/65 (26 May 2023 05:32) (100/61 - 112/60)  BP(mean): 78 (25 May 2023 20:23) (78 - 78)  RR: 18 (26 May 2023 05:32) (18 - 18)  SpO2: 100% (26 May 2023 08:29) (88% - 100%)    LABS:                        9.8    8.47  )-----------( 249      ( 25 May 2023 05:46 )             35.0     05-25    141  |  98  |  37<H>  ----------------------------<  193<H>  4.2   |  31  |  1.4    Ca    8.5      25 May 2023 21:54  Mg     2.0     05-25    TPro  6.3  /  Alb  3.2<L>  /  TBili  1.2  /  DBili  x   /  AST  28  /  ALT  30  /  AlkPhos  115  05-25                      Physical Exam:  CONSTITUTIONAL: No acute distress, alert and following commands  HEAD: Atraumatic, normocephalic  EYES: EOM intact, PERRLA, conjunctiva and sclera clear  PULMONARY: clear  CARDIOVASCULAR: Regular rate and rhythm  GASTROINTESTINAL: Soft, non-tender, non-distended; bowel sounds present  NEUROLOGY: non-focal, AxO= 3

## 2023-05-26 NOTE — BH CONSULTATION LIAISON ASSESSMENT NOTE - OTHER
very soft voice, some difficulty in pronunciation minimally cooperative; cordial but still refusing most of interview "not good" R PATTI unable to fully assess due to limited interview limited interview 2/2 patient refusal; but he denied any suicidal ideations slightly dysphoric but very blunted; appears tired BORA

## 2023-05-26 NOTE — BH CONSULTATION LIAISON ASSESSMENT NOTE - NSBHATTESTCOMMENTATTENDFT_PSY_A_CORE
Agree with resident note. Delirium appears to be present. EKg on 5/18 is noted to be prolonged in case of agitation would avoid the use of antipsychotic medications until a repeat EKG is done and QTc is not prolonged.

## 2023-05-26 NOTE — PROGRESS NOTE ADULT - SUBJECTIVE AND OBJECTIVE BOX
SUBJ: Patient seen and examined. No events overnight.       MEDICATIONS  (STANDING):  aspirin enteric coated 81 milliGRAM(s) Oral daily  buMETAnide Injectable 2 milliGRAM(s) IV Push every 12 hours  chlorhexidine 2% Cloths 1 Application(s) Topical daily  dextrose 5% + sodium chloride 0.9%. 1000 milliLiter(s) (50 mL/Hr) IV Continuous <Continuous>  dextrose 5%. 1000 milliLiter(s) (50 mL/Hr) IV Continuous <Continuous>  dextrose 5%. 1000 milliLiter(s) (100 mL/Hr) IV Continuous <Continuous>  dextrose 50% Injectable 25 Gram(s) IV Push once  dextrose 50% Injectable 12.5 Gram(s) IV Push once  dextrose 50% Injectable 25 Gram(s) IV Push once  DULoxetine 30 milliGRAM(s) Oral <User Schedule>  folic acid 1 milliGRAM(s) Oral <User Schedule>  glucagon  Injectable 1 milliGRAM(s) IntraMuscular once  heparin   Injectable 5000 Unit(s) SubCutaneous every 12 hours  hydrocortisone 2.5% Cream 1 Application(s) Topical two times a day  insulin glargine Injectable (LANTUS) 12 Unit(s) SubCutaneous at bedtime  insulin lispro (ADMELOG) corrective regimen sliding scale   SubCutaneous three times a day before meals  iron sucrose IVPB 200 milliGRAM(s) IV Intermittent every 24 hours  lactulose Syrup 10 Gram(s) Oral at bedtime  levothyroxine 25 MICROGram(s) Oral <User Schedule>  levothyroxine 50 MICROGram(s) Oral <User Schedule>  methotrexate (Non - oncologic) 10 milliGRAM(s) Oral every week  metoprolol succinate ER 25 milliGRAM(s) Oral daily  midodrine. 5 milliGRAM(s) Oral <User Schedule>  pantoprazole    Tablet 40 milliGRAM(s) Oral before breakfast  polyethylene glycol 3350 17 Gram(s) Oral two times a day  prasugrel 10 milliGRAM(s) Oral daily  senna 2 Tablet(s) Oral at bedtime  sodium chloride 3%. 150 milliLiter(s) (50 mL/Hr) IV Continuous <Continuous>  sodium chloride 3%. 150 milliLiter(s) (50 mL/Hr) IV Continuous <Continuous>  sodium chloride 3%. 150 milliLiter(s) (50 mL/Hr) IV Continuous <Continuous>  sodium chloride 3%. 150 milliLiter(s) (50 mL/Hr) IV Continuous <Continuous>  sodium chloride 3%. 150 milliLiter(s) (50 mL/Hr) IV Continuous <Continuous>  sodium chloride 3%. 150 milliLiter(s) (50 mL/Hr) IV Continuous <Continuous>    MEDICATIONS  (PRN):  acetaminophen     Tablet .. 325 milliGRAM(s) Oral every 4 hours PRN Moderate Pain (4 - 6)  dextrose Oral Gel 15 Gram(s) Oral once PRN Blood Glucose LESS THAN 70 milliGRAM(s)/deciliter  oxyCODONE    IR 10 milliGRAM(s) Oral four times a day PRN Moderate Pain (4 - 6)            Vital Signs Last 24 Hrs  T(C): 35.4 (26 May 2023 05:32), Max: 36.4 (25 May 2023 20:23)  T(F): 95.7 (26 May 2023 05:32), Max: 97.6 (25 May 2023 20:23)  HR: 79 (26 May 2023 05:32) (79 - 84)  BP: 103/65 (26 May 2023 05:32) (100/61 - 112/60)  BP(mean): 78 (25 May 2023 20:23) (78 - 78)  RR: 18 (26 May 2023 05:32) (18 - 18)  SpO2: 100% (26 May 2023 08:29) (88% - 100%)    Parameters below as of 26 May 2023 08:29  Patient On (Oxygen Delivery Method): nasal cannula  O2 Flow (L/min): 2       REVIEW OF SYSTEMS:  CONSTITUTIONAL: No fever  NECK: No pain or stiffness  CARDIOVASCULAR: patient denies chest pain, shortness of breath improved .  Respiratory: No cough or wheezing.  NEUROLOGICAL: No focal deficits to report.  GI: no BRBPR, no N,V,diarrhea.    PSYCHIATRY: normal mood and affect  HEENT: no nasal discharge, no ecchymosis  SKIN: no ecchymosis, no breakdown  MUSCULOSKELETAL: Full range of motion x4.      PHYSICAL EXAM:  GENERAL: NAD  HEAD:  Atraumatic, Normocephalic  NECK: Supple, No JVD  NERVOUS SYSTEM:  Alert & Oriented X3, Good concentration  CHEST/LUNG: Clear to anterior auscultation bilaterally  HEART: Regular rate and rhythm; HS murmurs  ABDOMEN: Soft, Nontender, Nondistended;   EXTREMITIES:  No  edema  Psych: Appropriate mood and affect     	  TELEMETRY:      LABS:                        9.8    8.47  )-----------( 249      ( 25 May 2023 05:46 )             35.0     05-25    141  |  98  |  37<H>  ----------------------------<  193<H>  4.2   |  31  |  1.4    Ca    8.5      25 May 2023 21:54  Mg     2.0     05-25    TPro  6.3  /  Alb  3.2<L>  /  TBili  1.2  /  DBili  x   /  AST  28  /  ALT  30  /  AlkPhos  115  05-25            I&O's Summary    25 May 2023 07:01  -  26 May 2023 07:00  --------------------------------------------------------  IN: 240 mL / OUT: 1075 mL / NET: -835 mL    26 May 2023 07:01  -  26 May 2023 09:23  --------------------------------------------------------  IN: 0 mL / OUT: 2300 mL / NET: -2300 mL      BNP  RADIOLOGY & ADDITIONAL STUDIES:    IMPRESSION AND PLAN:  HFrEF  Mod MR  MOISES  - Management as pe primary team  - Continue diuresis  - F/U on renal function and I/O  - Will follow as needed

## 2023-05-26 NOTE — BH CONSULTATION LIAISON ASSESSMENT NOTE - RISK ASSESSMENT
Modifiable risk factors include medical illness/pain and static risk factors include advanced age, male gender, and  race. Protective factors include social support from family and relationship stability.

## 2023-05-26 NOTE — PROGRESS NOTE ADULT - ASSESSMENT
67-year-old male w/ HFrEF 15-20% s/p ICD, decreased RV function, mild MR/TR, DM type I w/ neuropathy and hx hypoglycemia , DL, HTN, CAD, hx MI, s/p CABG, s/p stent (2018), hx in-stent thrombosis while on Plavix, PAD s/p 3 LLE stents, TIAGO (needs CPAP auto-regulating pressure 10-16, patient doesn't use it due to claustrophobia), Psoriasis, R-AKA presenting to ED s/p fall. He states he was lying in bed at home when he fell asleep and fell off the bed. Found to have moises and mild elevation of trop     1. Mechanical Fall   -No CT evidence for acute traumatic injury within the chest, abdomen or pelvis  -No evidence of acute intracranial pathology. Stable exam since 2023.  -No evidence of acute cervical spine fracture or subluxation.  -Vitals wnl     2.Likely Acute systolic CHF complicated by MOISES (cardiorenal) and Troponinemia  * pt denies chest pain but report orthopnea  - Telemetry            - ECG :paced rythm     -CXR: congestion with BNP 6000   - Cont asa/prasugrel   - Cont metoprolol  - has allergy to ACE and Entresto   - decrease Midodrine to 10mg BID  - Loja for strict I&O   - Trend trop   (detectable likely demand ischemia in the setting of chf)   - CT chest notable for Moderate to large bilateral pleural effusions, right greater than left. Small to moderate volume abdominopelvic ascites.  - Cardio consult appreciated >> Lasix, HF consult  - Nephro consult appreaciated  - HF consult apprecaited >> Bumex 2mg IV BID with 3% hypertonic saline, pt tolerating the Bumex well, BP under control (Hypertonic saline is ordered until , please order again if needed)  - Ferritin 53 >> start venofer 200mg for 5 days as per HF    3.  DM type 1 complicated by hypoglycemia  episodes of hypoglycemia at home  - Insulin protocol lantus and nutritional insulin was decreased.     4.Hypothyroid   -c/w  synthroid    5. Mild hyperkalemia (resolved)    6.Recent dx of RA   * pt was dx with RA due to upper ext arterial ulcer?   - Cont Methotrexate (last dose was 2023)     # DVT prophylaxis - lovenox   #R-LE non-weight bearing  #carb-consistent/dash diet  #full code (discussed GOC with pt on ), Palliative team consult appreciated    pendin) clinical improvement, fluid overload improvement;  2) psych consult

## 2023-05-26 NOTE — BH CONSULTATION LIAISON ASSESSMENT NOTE - NSBHCHARTREVIEWINVESTIGATE_PSY_A_CORE FT
< from: 12 Lead ECG (05.18.23 @ 10:56) >      Ventricular Rate 79 BPM    Atrial Rate 79 BPM    P-R Interval 170 ms    QRS Duration 158 ms    Q-T Interval 480 ms    QTC Calculation(Bazett) 550 ms    P Axis 51 degrees    R Axis -35 degrees    T Axis 65 degrees    Diagnosis Line Atrial-sensed ventricular-paced rhythm  Abnormal ECG    Confirmed by London Santos ([id number]) on 5/18/2023 4:12:01 PM    < end of copied text >

## 2023-05-26 NOTE — BH CONSULTATION LIAISON ASSESSMENT NOTE - NSBHCHARTREVIEWLAB_PSY_A_CORE FT
9.8    8.47  )-----------( 249      ( 25 May 2023 05:46 )             35.0   05-25    141  |  98  |  37<H>  ----------------------------<  193<H>  4.2   |  31  |  1.4    Ca    8.5      25 May 2023 21:54  Mg     2.0     05-25    TPro  6.3  /  Alb  3.2<L>  /  TBili  1.2  /  DBili  x   /  AST  28  /  ALT  30  /  AlkPhos  115  05-25

## 2023-05-26 NOTE — PROGRESS NOTE ADULT - PROBLEM SELECTOR PLAN 3
Met with spouse today, she is aware his mental status is too altered for him to go home. She said she is considering short term rehab unless he improves a lot prior to d/c.   She asked that I call her daughter to introduce palliative care as well.

## 2023-05-26 NOTE — PROGRESS NOTE ADULT - ASSESSMENT
Assessment: 67-year-old male w/ HFrEF 15-20% s/p ICD, DM type I, DL, HTN, CAD, hx MI, s/p CABG, s/p stent (2018), hx in-stent thrombosis while on Plavix, PAD s/p 3 LLE stents, TIAGO non-compliant w/ CPAP, R-AKA presenting to ED s/p fall (mechanical). Patient found to be fluid overloaded with MOISES, heart failure consulted for further management of ADHF.      Plan:  Patient remains fluid overloaded on exam 2/2 acute decompensated heart failure  POCUS exam: IVC dilated, non collapsing  Continue Bumex 2mg IVP BID and 3% Hypertonic Saline 150ml BID until Sunday evening if kidney function remains stable   Monday 5/29- switch IV diuretics to Torsemide 20mg daily  Continue low dose BB  Consider reinstating home spironolactone   Get BMP twice daily with magnesium   Maintain potassium >4.0, Mg >2.2  Continue to wean midodrine as tolerated- goal SBP > 85 mmHg  Strict intake and output  Daily weight   Physical therapy follow-up / OOB to chair   Continue IV iron sucrose as 200 mg iv daily over one hour for 5 days   Plan discussed with patient and primary team  Will continue to follow

## 2023-05-26 NOTE — BH CONSULTATION LIAISON ASSESSMENT NOTE - NSBHCONSULTFOLLOWAFTERCARE_PSY_A_CORE FT
If interested, may give referral to outpatient behavioral health [address] [place] [id number]. [phone].

## 2023-05-26 NOTE — BH CONSULTATION LIAISON ASSESSMENT NOTE - NSBHCHARTREVIEWVS_PSY_A_CORE FT
Vital Signs Last 24 Hrs  T(C): 35.4 (26 May 2023 05:32), Max: 36.4 (25 May 2023 20:23)  T(F): 95.7 (26 May 2023 05:32), Max: 97.6 (25 May 2023 20:23)  HR: 79 (26 May 2023 05:32) (79 - 84)  BP: 103/65 (26 May 2023 05:32) (100/61 - 112/60)  BP(mean): 78 (25 May 2023 20:23) (78 - 78)  RR: 18 (26 May 2023 05:32) (18 - 18)  SpO2: 100% (26 May 2023 08:29) (88% - 100%)    Parameters below as of 26 May 2023 08:29  Patient On (Oxygen Delivery Method): nasal cannula  O2 Flow (L/min): 2

## 2023-05-26 NOTE — PROGRESS NOTE ADULT - SUBJECTIVE AND OBJECTIVE BOX
HPI:  67-year-old male w/ HFrEF 15-20% s/p ICD, decreased RV function, mild MR/TR, DM type I w/ neuropathy and hx hypoglycemia , DL, HTN, CAD, hx MI, s/p CABG, s/p stent (2018), hx in-stent thrombosis while on Plavix, PAD s/p 3 LLE stents, TIAGO (needs CPAP auto-regulating pressure 10-16, patient doesn't use it due to claustrophobia), Psoriasis, R-AKA presenting to ED s/p fall. He states he was lying in bed at home when he fell asleep and fell off the bed, and complains of generalized headache neck pain, non-radiating, no alleviating or aggravating factors, associated with right anterior chest wall pain. Denies LOC,  fevers, chills, nausea, vomiting, dizziness, abdominal pain, shortness of breath. States he had TDAP recently. Pt was not down for long as wife was upstairs when he fell off the bed and called EMS right away.     Interval History    - Appears in some emotional distress, and complaining of pain. Wife at bedside     ADVANCE DIRECTIVES:    [ x] Full Code [ ] DNR  MOLST  [ ]  Living Will  [ ]   DECISION MAKER(s):  [x ] Health Care Proxy(s)  [x ] Surrogate(s)  [ ] Guardian           Name(s): Phone Number(s):    Answers: Emergency Contact Name Raine,  Answers: Emergency Contact Phone # [phone],  Answers: Emergency Contact Relationship wife    BASELINE (I)ADL(s) (prior to admission):  [place]: [ ]Total  [ ] Moderate [x ]Dependent    Allergies    ACE inhibitors (Rash)  Entresto (Swelling)  Atorvastatin Calcium (Unknown)  Bactrim (Unknown)  Plavix (Unknown)    Intolerances    MEDICATIONS  (STANDING):  aspirin enteric coated 81 milliGRAM(s) Oral daily  buMETAnide Injectable 2 milliGRAM(s) IV Push every 12 hours  chlorhexidine 2% Cloths 1 Application(s) Topical daily  dextrose 5% + sodium chloride 0.9%. 1000 milliLiter(s) (50 mL/Hr) IV Continuous <Continuous>  dextrose 5%. 1000 milliLiter(s) (100 mL/Hr) IV Continuous <Continuous>  dextrose 5%. 1000 milliLiter(s) (50 mL/Hr) IV Continuous <Continuous>  dextrose 50% Injectable 25 Gram(s) IV Push once  dextrose 50% Injectable 12.5 Gram(s) IV Push once  dextrose 50% Injectable 25 Gram(s) IV Push once  DULoxetine 30 milliGRAM(s) Oral <User Schedule>  folic acid 1 milliGRAM(s) Oral <User Schedule>  glucagon  Injectable 1 milliGRAM(s) IntraMuscular once  heparin   Injectable 5000 Unit(s) SubCutaneous every 12 hours  hydrocortisone 2.5% Cream 1 Application(s) Topical two times a day  insulin glargine Injectable (LANTUS) 12 Unit(s) SubCutaneous at bedtime  insulin lispro (ADMELOG) corrective regimen sliding scale   SubCutaneous three times a day before meals  iron sucrose IVPB 200 milliGRAM(s) IV Intermittent every 24 hours  lactulose Syrup 10 Gram(s) Oral at bedtime  levothyroxine 25 MICROGram(s) Oral <User Schedule>  levothyroxine 50 MICROGram(s) Oral <User Schedule>  methotrexate (Non - oncologic) 10 milliGRAM(s) Oral every week  metoprolol succinate ER 25 milliGRAM(s) Oral daily  midodrine. 5 milliGRAM(s) Oral <User Schedule>  pantoprazole    Tablet 40 milliGRAM(s) Oral before breakfast  polyethylene glycol 3350 17 Gram(s) Oral two times a day  prasugrel 10 milliGRAM(s) Oral daily  senna 2 Tablet(s) Oral at bedtime  sodium chloride 3%. 150 milliLiter(s) (50 mL/Hr) IV Continuous <Continuous>  sodium chloride 3%. 150 milliLiter(s) (50 mL/Hr) IV Continuous <Continuous>  sodium chloride 3%. 150 milliLiter(s) (50 mL/Hr) IV Continuous <Continuous>  sodium chloride 3%. 150 milliLiter(s) (50 mL/Hr) IV Continuous <Continuous>  sodium chloride 3%. 150 milliLiter(s) (50 mL/Hr) IV Continuous <Continuous>  sodium chloride 3%. 150 milliLiter(s) (50 mL/Hr) IV Continuous <Continuous>    MEDICATIONS  (PRN):  acetaminophen     Tablet .. 325 milliGRAM(s) Oral every 4 hours PRN Moderate Pain (4 - 6)  dextrose Oral Gel 15 Gram(s) Oral once PRN Blood Glucose LESS THAN 70 milliGRAM(s)/deciliter  oxyCODONE    IR 10 milliGRAM(s) Oral four times a day PRN Moderate Pain (4 - 6)    PRESENT SYMPTOMS: [ ]Unable to obtain due to poor mentation   Source if other than patient:  [ ]Family   [ ]Team     Pain: [x ]yes [ ]no  QOL impact - significant   Location - left leg               Aggravating factors - none   Quality - sharp   Radiation - no  Timing- constant   Severity (0-10 scale): 9/10   Minimal acceptable level (0-10 scale): 4/10     Dyspnea:                           [ x]None[ ]Mild [ ]Moderate [ ]Severe   Anxiety:                             [ ]None[ ]Mild [ ]Moderate [ ]Severe   Fatigue:                             [ ]None[ ]Mild [ ]Moderate [x ]Severe   Nausea:                             [ ]None[ ]Mild [ ]Moderate [ ]Severe   Loss of appetite:              [ ]None[ ]Mild [ ]Moderate [ ]Severe   Constipation:                    [ ]None[ ]Mild [ ]Moderate [ ]Severe    Other Symptoms:  [ xAll other review of systems negative     Palliative Performance Status Version 2:         %    [email or web address]  PHYSICAL EXAM:  Vital Signs Last 24 Hrs  T(C): 36.1 (26 May 2023 13:26), Max: 36.4 (25 May 2023 20:23)  T(F): 97 (26 May 2023 13:26), Max: 97.6 (25 May 2023 20:23)  HR: 81 (26 May 2023 13:26) (79 - 84)  BP: 101/57 (26 May 2023 13:26) (101/57 - 112/60)  BP(mean): 78 (25 May 2023 20:23) (78 - 78)  RR: 18 (26 May 2023 13:26) (18 - 18)  SpO2: 100% (26 May 2023 08:29) (88% - 100%)    Parameters below as of 26 May 2023 08:29  Patient On (Oxygen Delivery Method): nasal cannula  O2 Flow (L/min): 2   I&O's Summary    25 May 2023 07:01  -  26 May 2023 07:00  --------------------------------------------------------  IN: 240 mL / OUT: 1075 mL / NET: -835 mL    26 May 2023 07:01  -  26 May 2023 16:37  --------------------------------------------------------  IN: 540 mL / OUT: 3000 mL / NET: -2460 mL      GENERAL:  [ ] No acute distress [ ]Lethargic  [ ]Unarousable  [ ]Verbal  [ ]Non-Verbal [ ]Cachexia    BEHAVIORAL/PSYCH:  [x ]Alert and Oriented x  2[ x] Anxiety [ x] Delirium [ ] Agitation [ ] Calm   EYES: [ ] No scleral icterus [ ] Scleral icterus [ ] Closed  ENMT:  [ ]Dry mouth  [ ]No external oral lesions [ ] No external ear or nose lesions  CARDIOVASCULAR:  [ ]Regular [ ]Irregular [ ]Tachy [ ]Not Tachy  [ ][place] [ ] Edema [ ] No edema  PULMONARY:  [ ]Tachypnea  [ ]Audible excessive secretions [ x] No labored breathing [ ] labored breathing  GASTROINTESTINAL: [ x]Soft  [ ]Distended  [ ]Not distended [ ]Non tender [ ]Tender  MUSCULOSKELETAL: [ ]No clubbing [ ] clubbing  [ ] No cyanosis [ ] cyanosis  NEUROLOGIC: [ ]No focal deficits  [x ]Follows commands  [ ]Does not follow commands  [ ]Cognitive impairment  [ ]Dysphagia  [ ]Dysarthria  [ ]Paresis   SKIN: [ ] Jaundiced [x ] Non-jaundiced [ ]Rash [ ]No Rash [ ] Warm [ x] Multiple small sores   MISC/LINES: [ ] ET tube [ ] Trach [ ]NGT/OGT [ ]PEG [ x]Loja    CRITICAL CARE:  [ ] Shock Present  [ ]Septic [ ]Cardiogenic [ ]Neurologic [ ]Hypovolemic  [ ]  Vasopressors [ ]  Inotropes   [ ]Respiratory failure present [ ]Mechanical ventilation [ ]Non-invasive ventilatory support [ ]High flow  [ ]Acute  [ ]Chronic [ ]Hypoxic  [ ]Hypercarbic [ ]Other  [ ]Other organ failure    LABS: reviewed by me                        9.8    8.47  )-----------( 249      ( 25 May 2023 05:46 )             35.0   05-25    141  |  98  |  37<H>  ----------------------------<  193<H>  4.2   |  31  |  1.4    Ca    8.5      25 May 2023 21:54  Mg     2.0     05-25    TPro  6.3  /  Alb  3.2<L>  /  TBili  1.2  /  DBili  x   /  AST  28  /  ALT  30  /  AlkPhos  115  05-25        RADIOLOGY & ADDITIONAL STUDIES: reviewed by me    EKG: reviewed by me    REFERRALS:   [x ]Chaplaincy  [ ]Hospice  [ ]Child Life  [x ]Social Work  [x ]Case management [ ]Holistic Therapy     Patient discussed with primary medical team MD  Palliative care education provided to patient and/or family    Goals of Care Document:

## 2023-05-26 NOTE — PROGRESS NOTE ADULT - ASSESSMENT
67M with PMH of HRrEF (15-20% EF) s/p AICD, DM1, HLD, HTN CAD s/p MI s/p CABG, PAD s/p 3 LLE stents, TIAGO, psoariasis, and R AKA here after mechanical fall, without acute trauma noted. Course c/b acute-on-chronic HFrEF and CRS, with type 2 NSTEMI due to demand ischemia, on diuresis. Also with mild transaminitis, likely from congestion. Palliative care consulted for GOC. Patient is full code.    See GOC note today. Discussed case with 3C resident and psychiatry. Pt is delirious and spouse is surrogate decision maker. She is open to speaking further asked to call daughter Mely- call made, discussed palliative care and his overall condition and treatment.  - time spent advance care planning with mother and daughter 60 minutes

## 2023-05-26 NOTE — PROGRESS NOTE ADULT - SUBJECTIVE AND OBJECTIVE BOX
Date of Admission: 23    Interval History: Patient seen and examined in bed, drowsy but answering questions appropriately. No acute overnight events reported, no new complaints at this time. Remains on Bumex IVP, inaccurate INOs reported past 24hrs. Awaiting labs for today .    CHIEF COMPLAINT: Patient is a 67y old  Male who presents with a chief complaint of Mechanical fall (22 May 2023 12:56)    HISTORY OF PRESENT ILLNESS: 67-year-old male w/ HFrEF 15-20% s/p ICD, decreased RV function, mild MR/TR, DM type I w/ neuropathy and hx hypoglycemia , DL, HTN, CAD, hx MI, s/p CABG, s/p stent (2018), hx in-stent thrombosis while on Plavix, PAD s/p 3 LLE stents, TIAGO (needs CPAP auto-regulating pressure 10-16, patient doesn't use it due to claustrophobia), Psoriasis, R-AKA presenting to ED s/p fall. He states he was lying in bed at home when he fell asleep and fell off the bed, and complains of generalized headache neck pain, non-radiating, no alleviating or aggravating factors, associated with right anterior chest wall pain. Denies LOC,  fevers, chills, nausea, vomiting, dizziness, abdominal pain, shortness of breath. States he had TDAP recently. Pt was not down for long as wife was upstairs when he fell off the bed and called EMS right away.   Labs significant for cr 1.8 (baseline ~ 1.2)  trop 0.10 (18 May 2023 14:16)    Patient reports having a known history of low EF, follows w/ Dr. Foote (primary cardiologist) and Dr. Mejia (EP). Stated he takes Lasix 20mg ~ daily at home, recently increased to 40mg daily as he has been experiencing progressively worsening SOB x2 weeks. Admits to eating a moderate Na diet at home. Endorses compliance with home GDMT. States he has been mostly wheelchair bound x1 year 2/2 to musculoskeletal issues.        PAST MEDICAL & SURGICAL HISTORY:  Status post percutaneous transluminal coronary angioplasty  2 stents  Atherosclerosis of coronary artery  CAD (coronary artery disease)  Osteoarthritis  HLD (hyperlipidemia)  Diabetes  on insulin pump  CHF (congestive heart failure)  EF ~ 25%  History of celiac disease  Diverticulitis  STEMI (ST elevation myocardial infarction)  Diabetic neuropathy  Hands and Feet  Anxiety and depression  Other postprocedural status  Fixation hardware in foot LEFT  Stented coronary artery  10/18 heart attack  INFERIOR WALL MI  S/P CABG x 1  2018  S/P TKR (total knee replacement), right  with infection Mrsa  per pt he was cleared from MRSA infection  Surgery, elective  right knee wound debridement  History of open reduction and internal fixation (ORIF) procedure  right hip  H/O shoulder surgery  right  AICD (automatic cardioverter/defibrillator) present  [facility] [place]  Cholecystostomy care  drain inserted  & removed 4 months ago  History of tonsillectomy  History of hip replacement, total, right  Elective surgery  plastic surgery Left shin      FAMILY HISTORY: Patient was adopted, family history unknown to him  SOCIAL HISTORY:  Denies smoking, alcohol, or drug use      Allergies  ACE inhibitors (Rash)  Entresto (Swelling)  Atorvastatin Calcium (Unknown)  Bactrim (Unknown)  Plavix (Unknown)    Intolerances      PHYSICAL EXAM:  T(C): 36.1 (26 May 2023 13:26), Max: 36.4 (25 May 2023 20:23)  T(F): 97 (26 May 2023 13:26), Max: 97.6 (25 May 2023 20:23)  HR: 81 (26 May 2023 13:26) (79 - 84)  BP: 101/57 (26 May 2023 13:26) (101/57 - 112/60)  BP(mean): 78 (25 May 2023 20:23) (78 - 78)  RR: 18 (26 May 2023 13:26) (18 - 18)  SpO2: 100% (26 May 2023 08:29) (88% - 100%)    O2 Parameters below as of 26 May 2023 08:29  Patient On (Oxygen Delivery Method): nasal cannula  O2 Flow (L/min): 2      General Appearance: normal for age and gender. 	  Neck: JVP [id number]   Eyes: Extra Ocular muscles intact.   Cardiovascular: regular rate and rhythm S1 S2, No edema  Respiratory: decreased B/L  Psychiatry: Drowsy, oriented x 2-3, poor historian  Gastrointestinal:  Soft, Non-tender  Skin/Integumentary: No rashes, No ecchymoses, No cyanosis	  Neurologic: Non-focal  Musculoskeletal/ extremities: R AKA, No clubbing, cyanosis or edema  Vascular: Peripheral pulses palpable 2+ B/L UE         LABS:	 	    CBC Full  -  ( 25 May 2023 05:46 )  WBC Count : 8.47 K/uL  RBC Count : 5.00 M/uL  Hemoglobin : 9.8 g/dL  Hematocrit : 35.0 %  Platelet Count - Automated : 249 K/uL  Mean Cell Volume : 70.0 fL  Mean Cell Hemoglobin : 19.6 pg  Mean Cell Hemoglobin Concentration : 28.0 g/dL  Auto Neutrophil # : 6.52 K/uL  Auto Lymphocyte # : 0.60 K/uL  Auto Monocyte # : 0.97 K/uL  Auto Eosinophil # : 0.29 K/uL  Auto Basophil # : 0.06 K/uL  Auto Neutrophil % : 76.9 %  Auto Lymphocyte % : 7.1 %  Auto Monocyte % : 11.5 %  Auto Eosinophil % : 3.4 %  Auto Basophil % : 0.7 %      Basic Metabolic Panel (23 @ 21:54)    Sodium, Serum: 141 mmol/L   Potassium, Serum: 4.2 mmol/L   Chloride, Serum: 98 mmol/L   Carbon Dioxide, Serum: 31 mmol/L   Anion Gap, Serum: 12 mmol/L   Blood Urea Nitrogen, Serum: 37 mg/dL   Creatinine, Serum: 1.4 mg/dL   Glucose, Serum: 193 mg/dL   Calcium, Total Serum: 8.5 mg/dL   eGFR: 56: The estimated glomerular filtration rate (eGFR) is calculated using the   CKD-EPI creatinine equation, which does not have a coefficient for  race and is validated in individuals 18 years of age and older (N Engl J  Med ; [id number]). Creatinine-based eGFR may be inaccurate in  various situations including but not limited to extremes of muscle mass,  altered dietary protein intake, or medications that affect renal tubular  creatinine secretion. mL/min/1.73m2      CARDIAC MARKERS:  Pro-Brain Natriuretic Peptide: 9132 pg/mL (23 @ 07:01)        TELEMETRY EVENTS: 	    EC23    Ventricular Rate 79 BPM  Atrial Rate 79 BPM  P-R Interval 170 ms  QRS Duration 158 ms  Q-T Interval 480 ms  QTC Calculation(Bazett) 550 ms  P Axis 51 degrees  R Axis -35 degrees  T Axis 65 degrees    Diagnosis Line Atrial-sensed ventricular-paced rhythm  Abnormal ECG    Confirmed by London Santos ([id number]) on 2023 4:12:01 PM      PREVIOUS DIAGNOSTIC TESTING:    TTE 23    Summary:   1. Left ventricular ejection fraction, by visual estimation, is <20%.   2. Severely decreased global left ventricular systolic function.   3. Mildly increased LV wall thickness.   4. Severe concentric left ventricular hypertrophy.   5. Spectral Doppler shows restrictive pattern of left ventricular   myocardial filling (Grade III diastolic dysfunction).   6. Moderately enlarged right ventricle.   7. Moderately reduced RV systolic function.   8. Elevated mean left atrial pressure.   9. Moderately enlarged left atrium.  10.Moderate mitral valve regurgitation.  11. The mitral valve leaflets are tethered which is due to reduced   systolic function and elevated LVDP.  12. Moderate-severe tricuspid regurgitation.  13. Estimated pulmonary artery systolic pressure is 48.4 mmHg assuming a   right atrial pressure of 8 mmHg, which is consistent with mild pulmonary   hypertension.  14. Patient is in normal sinus rhythm.  15. Compared to the prior TTE dated 21, the patient's cardiomyopathy   has worsened.      Left Heart Catheterization: 18    IMPRESSIONS: There is significant single vessel native coronary artery  disease. Patent LIMA to LAD. No significant restenosis in RCA/OM1.    	    Home Medications:  aspirin 81 mg oral delayed release tablet: 1 tab(s) orally once a day (2023 02:35)  diazePAM 2 mg oral tablet: 0.5 tab(s) orally once a day (at bedtime) (2023 02:35)  DULoxetine 30 mg oral delayed release capsule: 1 cap(s) orally 3 times a day (2023 02:35)  insulin glargine 100 units/mL subcutaneous solution: 25 unit(s) subcutaneous once a day (at bedtime) (2023 02:35)  insulin glargine 100 units/mL subcutaneous solution: 40 microcurie subcutaneous once a day (2023 02:35)  insulin lispro 100 units/mL injectable solution: if your finger stick is 150-199 please inject 2 units  if your finger stick is 200-250 please inject 4 units  if your finger stick is 251-300 please inject 6 units  if your finger stick is 301-350 please inject 8 units  if your finger stick is more than 350 please call your physician    (2023 02:35)  levothyroxine 25 mcg (0.025 mg) oral tablet: 1 tab(s) orally 6 times a week (2023 02:35)  levothyroxine 50 mcg (0.05 mg) oral tablet: 1 tab(s) orally once a week (2023 02:35)  metoprolol succinate 25 mg oral tablet, extended release: 1 tab(s) orally once a day (2023 02:35)  Multiple Vitamins oral tablet: 1 tab(s) orally once a day (2023 02:35)  pantoprazole 40 mg oral delayed release tablet: 1 tab(s) orally once a day (before a meal) (2023 02:35)  prasugrel 10 mg oral tablet: 1 tab(s) orally once a day (2023 02:35)  rosuvastatin 40 mg oral tablet: 1 tab(s) orally once a day (2023 02:35)  spironolactone 25 mg oral tablet: 1 tab(s) orally once a day (2023 02:35)        MEDICATIONS  (STANDING):  aspirin enteric coated 81 milliGRAM(s) Oral daily  dextrose 5% + sodium chloride 0.9%. 1000 milliLiter(s) (50 mL/Hr) IV Continuous <Continuous>  dextrose 5%. 1000 milliLiter(s) (100 mL/Hr) IV Continuous <Continuous>  dextrose 5%. 1000 milliLiter(s) (50 mL/Hr) IV Continuous <Continuous>  dextrose 50% Injectable 25 Gram(s) IV Push once  dextrose 50% Injectable 12.5 Gram(s) IV Push once  dextrose 50% Injectable 25 Gram(s) IV Push once  diazepam    Tablet 1 milliGRAM(s) Oral at bedtime  DULoxetine 30 milliGRAM(s) Oral <User Schedule>  furosemide   Injectable 20 milliGRAM(s) IV Push two times a day  glucagon  Injectable 1 milliGRAM(s) IntraMuscular once  heparin   Injectable 5000 Unit(s) SubCutaneous every 12 hours  hydrocortisone 2.5% Cream 1 Application(s) Topical two times a day  insulin glargine Injectable (LANTUS) 12 Unit(s) SubCutaneous at bedtime  insulin lispro (ADMELOG) corrective regimen sliding scale   SubCutaneous three times a day before meals  lactulose Syrup 10 Gram(s) Oral at bedtime  levothyroxine 25 MICROGram(s) Oral <User Schedule>  levothyroxine 50 MICROGram(s) Oral <User Schedule>  metoprolol succinate ER 25 milliGRAM(s) Oral daily  midodrine. 10 milliGRAM(s) Oral three times a day  pantoprazole    Tablet 40 milliGRAM(s) Oral before breakfast  polyethylene glycol 3350 17 Gram(s) Oral two times a day  prasugrel 10 milliGRAM(s) Oral daily  senna 2 Tablet(s) Oral at bedtime    MEDICATIONS  (PRN):  dextrose Oral Gel 15 Gram(s) Oral once PRN Blood Glucose LESS THAN 70 milliGRAM(s)/deciliter

## 2023-05-26 NOTE — BH CONSULTATION LIAISON ASSESSMENT NOTE - CURRENT MEDICATION
MEDICATIONS  (STANDING):  aspirin enteric coated 81 milliGRAM(s) Oral daily  buMETAnide Injectable 2 milliGRAM(s) IV Push every 12 hours  chlorhexidine 2% Cloths 1 Application(s) Topical daily  dextrose 5% + sodium chloride 0.9%. 1000 milliLiter(s) (50 mL/Hr) IV Continuous <Continuous>  dextrose 5%. 1000 milliLiter(s) (50 mL/Hr) IV Continuous <Continuous>  dextrose 5%. 1000 milliLiter(s) (100 mL/Hr) IV Continuous <Continuous>  dextrose 50% Injectable 25 Gram(s) IV Push once  dextrose 50% Injectable 12.5 Gram(s) IV Push once  dextrose 50% Injectable 25 Gram(s) IV Push once  DULoxetine 30 milliGRAM(s) Oral <User Schedule>  folic acid 1 milliGRAM(s) Oral <User Schedule>  glucagon  Injectable 1 milliGRAM(s) IntraMuscular once  heparin   Injectable 5000 Unit(s) SubCutaneous every 12 hours  hydrocortisone 2.5% Cream 1 Application(s) Topical two times a day  insulin glargine Injectable (LANTUS) 12 Unit(s) SubCutaneous at bedtime  insulin lispro (ADMELOG) corrective regimen sliding scale   SubCutaneous three times a day before meals  iron sucrose IVPB 200 milliGRAM(s) IV Intermittent every 24 hours  lactulose Syrup 10 Gram(s) Oral at bedtime  levothyroxine 25 MICROGram(s) Oral <User Schedule>  levothyroxine 50 MICROGram(s) Oral <User Schedule>  methotrexate (Non - oncologic) 10 milliGRAM(s) Oral every week  metoprolol succinate ER 25 milliGRAM(s) Oral daily  midodrine. 5 milliGRAM(s) Oral <User Schedule>  pantoprazole    Tablet 40 milliGRAM(s) Oral before breakfast  polyethylene glycol 3350 17 Gram(s) Oral two times a day  prasugrel 10 milliGRAM(s) Oral daily  senna 2 Tablet(s) Oral at bedtime  sodium chloride 3%. 150 milliLiter(s) (50 mL/Hr) IV Continuous <Continuous>  sodium chloride 3%. 150 milliLiter(s) (50 mL/Hr) IV Continuous <Continuous>  sodium chloride 3%. 150 milliLiter(s) (50 mL/Hr) IV Continuous <Continuous>  sodium chloride 3%. 150 milliLiter(s) (50 mL/Hr) IV Continuous <Continuous>  sodium chloride 3%. 150 milliLiter(s) (50 mL/Hr) IV Continuous <Continuous>  sodium chloride 3%. 150 milliLiter(s) (50 mL/Hr) IV Continuous <Continuous>    MEDICATIONS  (PRN):  acetaminophen     Tablet .. 325 milliGRAM(s) Oral every 4 hours PRN Moderate Pain (4 - 6)  dextrose Oral Gel 15 Gram(s) Oral once PRN Blood Glucose LESS THAN 70 milliGRAM(s)/deciliter  oxyCODONE    IR 10 milliGRAM(s) Oral four times a day PRN Moderate Pain (4 - 6)

## 2023-05-26 NOTE — PROGRESS NOTE ADULT - SUBJECTIVE AND OBJECTIVE BOX
FLOWER MARISCAL  67y  Male      Patient is a 67y old  Male who presents with a chief complaint of Mechanical fall (26 May 2023 12:39)      INTERVAL HPI/OVERNIGHT EVENTS:  He feels weak, confused, no chest pain.   Vital Signs Last 24 Hrs  T(C): 36.1 (26 May 2023 13:26), Max: 36.4 (25 May 2023 20:23)  T(F): 97 (26 May 2023 13:26), Max: 97.6 (25 May 2023 20:23)  HR: 81 (26 May 2023 13:26) (79 - 84)  BP: 101/57 (26 May 2023 13:26) (101/57 - 112/60)  BP(mean): 78 (25 May 2023 20:23) (78 - 78)  RR: 18 (26 May 2023 13:26) (18 - 18)  SpO2: 100% (26 May 2023 08:29) (88% - 100%)    Parameters below as of 26 May 2023 08:29  Patient On (Oxygen Delivery Method): nasal cannula  O2 Flow (L/min): 2        05-25-23 @ 07:01  -  05-26-23 @ 07:00  --------------------------------------------------------  IN: 240 mL / OUT: 1075 mL / NET: -835 mL    05-26-23 @ 07:01  -  05-26-23 @ 16:00  --------------------------------------------------------  IN: 540 mL / OUT: 3000 mL / NET: -2460 mL            Consultant(s) Notes Reviewed:  [x ] YES  [ ] NO          MEDICATIONS  (STANDING):  aspirin enteric coated 81 milliGRAM(s) Oral daily  buMETAnide Injectable 2 milliGRAM(s) IV Push every 12 hours  chlorhexidine 2% Cloths 1 Application(s) Topical daily  dextrose 5% + sodium chloride 0.9%. 1000 milliLiter(s) (50 mL/Hr) IV Continuous <Continuous>  dextrose 5%. 1000 milliLiter(s) (50 mL/Hr) IV Continuous <Continuous>  dextrose 5%. 1000 milliLiter(s) (100 mL/Hr) IV Continuous <Continuous>  dextrose 50% Injectable 25 Gram(s) IV Push once  dextrose 50% Injectable 12.5 Gram(s) IV Push once  dextrose 50% Injectable 25 Gram(s) IV Push once  DULoxetine 30 milliGRAM(s) Oral <User Schedule>  folic acid 1 milliGRAM(s) Oral <User Schedule>  glucagon  Injectable 1 milliGRAM(s) IntraMuscular once  heparin   Injectable 5000 Unit(s) SubCutaneous every 12 hours  hydrocortisone 2.5% Cream 1 Application(s) Topical two times a day  insulin glargine Injectable (LANTUS) 12 Unit(s) SubCutaneous at bedtime  insulin lispro (ADMELOG) corrective regimen sliding scale   SubCutaneous three times a day before meals  iron sucrose IVPB 200 milliGRAM(s) IV Intermittent every 24 hours  lactulose Syrup 10 Gram(s) Oral at bedtime  levothyroxine 25 MICROGram(s) Oral <User Schedule>  levothyroxine 50 MICROGram(s) Oral <User Schedule>  methotrexate (Non - oncologic) 10 milliGRAM(s) Oral every week  metoprolol succinate ER 25 milliGRAM(s) Oral daily  midodrine. 5 milliGRAM(s) Oral <User Schedule>  pantoprazole    Tablet 40 milliGRAM(s) Oral before breakfast  polyethylene glycol 3350 17 Gram(s) Oral two times a day  prasugrel 10 milliGRAM(s) Oral daily  senna 2 Tablet(s) Oral at bedtime  sodium chloride 3%. 150 milliLiter(s) (50 mL/Hr) IV Continuous <Continuous>  sodium chloride 3%. 150 milliLiter(s) (50 mL/Hr) IV Continuous <Continuous>  sodium chloride 3%. 150 milliLiter(s) (50 mL/Hr) IV Continuous <Continuous>  sodium chloride 3%. 150 milliLiter(s) (50 mL/Hr) IV Continuous <Continuous>  sodium chloride 3%. 150 milliLiter(s) (50 mL/Hr) IV Continuous <Continuous>  sodium chloride 3%. 150 milliLiter(s) (50 mL/Hr) IV Continuous <Continuous>    MEDICATIONS  (PRN):  acetaminophen     Tablet .. 325 milliGRAM(s) Oral every 4 hours PRN Moderate Pain (4 - 6)  dextrose Oral Gel 15 Gram(s) Oral once PRN Blood Glucose LESS THAN 70 milliGRAM(s)/deciliter  oxyCODONE    IR 10 milliGRAM(s) Oral four times a day PRN Moderate Pain (4 - 6)      LABS                          9.8    8.47  )-----------( 249      ( 25 May 2023 05:46 )             35.0     05-25    141  |  98  |  37<H>  ----------------------------<  193<H>  4.2   |  31  |  1.4    Ca    8.5      25 May 2023 21:54  Mg     2.0     05-25    TPro  6.3  /  Alb  3.2<L>  /  TBili  1.2  /  DBili  x   /  AST  28  /  ALT  30  /  AlkPhos  115  05-25          Lactate Trend  05-24 @ 16:00 Lactate:1.1   05-24 @ 04:30 Lactate:2.5   05-22 @ 18:22 Lactate:2.1         CAPILLARY BLOOD GLUCOSE      POCT Blood Glucose.: 214 mg/dL (26 May 2023 11:36)        RADIOLOGY & ADDITIONAL TESTS:    Imaging Personally Reviewed:  [ ] YES  [ ] NO    HEALTH ISSUES - PROBLEM Dx:  Acute on chronic HFrEF (heart failure with reduced ejection fraction)    Cardiorenal syndrome    Transaminitis    Advanced care planning/counseling discussion    Encounter for palliative care    S/P AKA (above knee amputation), right    Anxiety    Altered mental status            PHYSICAL EXAM:  GENERAL: weak, mild malnourished   HEAD:  Atraumatic, Normocephalic.  EYES: EOMI, PERRLA, conjunctiva and sclera clear.  NECK: Supple, No JVD.  CHEST/LUNG: Clear to auscultation bilaterally; No wheeze.  HEART: Regular rate and rhythm; S1 S2.   ABDOMEN: Soft, Nontender, Nondistended; Bowel sounds present.  EXTREMITIES: right above knee amputation, small LLE healing ulcers.   PSYCH: Awake, mildly confused.   NEUROLOGY: non-focal.  SKIN: No rashes or lesions.

## 2023-05-26 NOTE — BH CONSULTATION LIAISON ASSESSMENT NOTE - OTHER PAST PSYCHIATRIC HISTORY (INCLUDE DETAILS REGARDING ONSET, COURSE OF ILLNESS, INPATIENT/OUTPATIENT TREATMENT)
none known but patient is on home medication, Duloxetine 30mg TID.  on reported history of SA or IPP.

## 2023-05-26 NOTE — BH CONSULTATION LIAISON ASSESSMENT NOTE - HPI (INCLUDE ILLNESS QUALITY, SEVERITY, DURATION, TIMING, CONTEXT, MODIFYING FACTORS, ASSOCIATED SIGNS AND SYMPTOMS)
66 y/o man with PMH of HFrEF 15-20% s/p ICD, decreased RV function, mild MR/TR, DM type I w/ neuropathy and hx hypoglycemia (3 episodes in past 2 weeks), DL, HTN, CAD s/p MI, s/p CABG, s/p stent (2018), hx in-stent thrombosis while on Plavix, PAD s/p 3 LLE stents (2 months prior to admission), TIAGO (needs CPAP auto-regulating pressure 10-16, patient doesn't use it due to claustrophobia), Psoriasis, s/p right AKA, MRSA bacteremia, left ankle surgery with fixation hardware and hypothyroid and admission last month for recurrent cellulitis complicated by acute over chronic CHF and hypoglycemia now presents with worsening shortness of breath over the past week (pt unable to weight himself due to right AKA). Consult was placed for psychiatry to perform a mental health evaluation.    Lexii spoke with primary medical team, who report low concern for any acute psychiatric symptoms. They explain that palliative care has been involved and are interested in MHE.    Patient appears to be sleeping on approach today. He awakens to verbal stimulus. He is very soft-spoken with suspected slight speech impediment. Interview is limited due to poor patient participation. He reports that physically he feels better today than when he first came in. He is oriented to place and year (not month), as well as situation. He reports that his mood has been "not good," noting "ups and downs" for the past several months. He endorses that his mood and "ups and downs" have all been related to his numerous medical issues going on. He denies any desire or thoughts to end his life, and he denies any history of suicide attempts or psychiatric hospitalization. He states that he is "tired" and does not wish to continue the interview any further.    Wife's phone number was attempted but no answer.      COLLATERAL from Palliative Care team (Kelley Gutierrez):  Report that during GOC discussions, patient has had waxing and waning consciousness. Patient has been A+Ox2 on chart review as well. Also reports that as per wife, patient experiences possible hallucinations. Patient is reported to not take all his medications regularly at home but he is prescribed regular opioid pain medications and benzodiazepines of note.    Patient has been seen by  Psych back in 2020 several times. At those times, there was also concern for acute delirium, as well as possible underlying neurocognitive disorder and adjustment disorder. Patient is currently on Duloxetine 30mg TID as noted on chart review. Valium 1mg at bedtime is also listed but was auto-discontinued on 5/25/23.

## 2023-05-27 NOTE — PROGRESS NOTE ADULT - ASSESSMENT
67-year-old male w/ HFrEF 15-20% s/p ICD, decreased RV function, mild MR/TR, DM type I w/ neuropathy and hx hypoglycemia , DL, HTN, CAD, hx MI, s/p CABG, s/p stent (2018), hx in-stent thrombosis while on Plavix, PAD s/p 3 LLE stents, TIAGO (needs CPAP auto-regulating pressure 10-16, patient doesn't use it due to claustrophobia), Psoriasis, R-AKA presenting to ED s/p fall. He states he was lying in bed at home when he fell asleep and fell off the bed. Found to have moises and mild elevation of trop     1. Mechanical Fall   -No CT evidence for acute traumatic injury within the chest, abdomen or pelvis  -No evidence of acute intracranial pathology. Stable exam since 2023.  -No evidence of acute cervical spine fracture or subluxation.  -Vitals wnl     2.Likely Acute systolic CHF complicated by MOISES (cardiorenal) and Troponinemia  * pt denies chest pain but report orthopnea  - Telemetry            - ECG :paced rythm     -CXR: congestion with BNP 6000   - Cont asa/prasugrel   - Cont metoprolol  - has allergy to ACE and Entresto   - Midodrine to 5mg BID (wean as tolerated)  - Loja for strict I&O   - Trend trop   (detectable likely demand ischemia in the setting of chf)   - CT chest notable for Moderate to large bilateral pleural effusions, right greater than left. Small to moderate volume abdominopelvic ascites.  - Nephro consult appreaciated  - HF consult apprecaited >> Bumex 2mg IV BID with 3% hypertonic saline, pt tolerating the Bumex well, BP under control >> DC Bumex on , and start Torsemide 20mg daily on   - Ferritin 53 >> venofer 200mg for 5 days as per HF    3.  DM type 1 complicated by hypoglycemia  episodes of hypoglycemia at home  - Insulin protocol lantus and nutritional insulin     4.Hypothyroid   -c/w  synthroid    5. Mild hyperkalemia (resolved)    6.Recent dx of RA   * pt was dx with RA due to upper ext arterial ulcer?   - Cont Methotrexate (last dose was 2023)     # DVT prophylaxis - lovenox   #R-LE non-weight bearing  #carb-consistent/dash diet  #full code (discussed GOC with pt on ), Palliative team consult appreciated    pendin) clinical improvement, fluid overload improvement ?? Dc bumex on  and then start torsemide from monday as per HF team

## 2023-05-27 NOTE — PROGRESS NOTE ADULT - SUBJECTIVE AND OBJECTIVE BOX
FLOWER MARISCAL  67y  Male      Patient is a 67y old  Male who presents with a chief complaint of Mechanical fall (27 May 2023 09:34)      INTERVAL HPI/OVERNIGHT EVENTS:  He feels ok, no chest pain.   Vital Signs Last 24 Hrs  T(C): 36.6 (27 May 2023 14:17), Max: 36.7 (26 May 2023 19:24)  T(F): 97.8 (27 May 2023 14:17), Max: 98 (26 May 2023 19:24)  HR: 83 (27 May 2023 14:17) (81 - 83)  BP: 108/62 (27 May 2023 14:17) (106/61 - 113/77)  BP(mean): 78 (26 May 2023 19:24) (78 - 78)  RR: 18 (27 May 2023 14:17) (18 - 22)  SpO2: --          05-26-23 @ 07:01  -  05-27-23 @ 07:00  --------------------------------------------------------  IN: 540 mL / OUT: 6100 mL / NET: -5560 mL    05-27-23 @ 07:01  - 05-27-23 @ 14:35  --------------------------------------------------------  IN: 240 mL / OUT: 1200 mL / NET: -960 mL            Consultant(s) Notes Reviewed:  [x ] YES  [ ] NO          MEDICATIONS  (STANDING):  aspirin enteric coated 81 milliGRAM(s) Oral daily  buMETAnide Injectable 2 milliGRAM(s) IV Push every 12 hours  chlorhexidine 2% Cloths 1 Application(s) Topical daily  dextrose 5% + sodium chloride 0.9%. 1000 milliLiter(s) (50 mL/Hr) IV Continuous <Continuous>  dextrose 5%. 1000 milliLiter(s) (100 mL/Hr) IV Continuous <Continuous>  dextrose 5%. 1000 milliLiter(s) (50 mL/Hr) IV Continuous <Continuous>  dextrose 50% Injectable 25 Gram(s) IV Push once  dextrose 50% Injectable 12.5 Gram(s) IV Push once  dextrose 50% Injectable 25 Gram(s) IV Push once  DULoxetine 30 milliGRAM(s) Oral <User Schedule>  folic acid 1 milliGRAM(s) Oral <User Schedule>  glucagon  Injectable 1 milliGRAM(s) IntraMuscular once  heparin   Injectable 5000 Unit(s) SubCutaneous every 12 hours  hydrocortisone 2.5% Cream 1 Application(s) Topical two times a day  insulin glargine Injectable (LANTUS) 12 Unit(s) SubCutaneous at bedtime  insulin lispro (ADMELOG) corrective regimen sliding scale   SubCutaneous three times a day before meals  iron sucrose IVPB 200 milliGRAM(s) IV Intermittent every 24 hours  lactulose Syrup 10 Gram(s) Oral at bedtime  levothyroxine 25 MICROGram(s) Oral <User Schedule>  levothyroxine 50 MICROGram(s) Oral <User Schedule>  magnesium sulfate  IVPB 2 Gram(s) IV Intermittent once  methotrexate (Non - oncologic) 10 milliGRAM(s) Oral every week  metoprolol succinate ER 25 milliGRAM(s) Oral daily  midodrine. 5 milliGRAM(s) Oral <User Schedule>  pantoprazole    Tablet 40 milliGRAM(s) Oral before breakfast  polyethylene glycol 3350 17 Gram(s) Oral two times a day  prasugrel 10 milliGRAM(s) Oral daily  senna 2 Tablet(s) Oral at bedtime  sodium chloride 3%. 150 milliLiter(s) (50 mL/Hr) IV Continuous <Continuous>  sodium chloride 3%. 150 milliLiter(s) (50 mL/Hr) IV Continuous <Continuous>  sodium chloride 3%. 150 milliLiter(s) (50 mL/Hr) IV Continuous <Continuous>  sodium chloride 3%. 150 milliLiter(s) (50 mL/Hr) IV Continuous <Continuous>  sodium chloride 3%. 150 milliLiter(s) (50 mL/Hr) IV Continuous <Continuous>  sodium chloride 3%. 150 milliLiter(s) (50 mL/Hr) IV Continuous <Continuous>  sodium chloride 3%. 150 milliLiter(s) (50 mL/Hr) IV Continuous <Continuous>  sodium chloride 3%. 150 milliLiter(s) (50 mL/Hr) IV Continuous <Continuous>    MEDICATIONS  (PRN):  acetaminophen     Tablet .. 325 milliGRAM(s) Oral every 4 hours PRN Moderate Pain (4 - 6)  dextrose Oral Gel 15 Gram(s) Oral once PRN Blood Glucose LESS THAN 70 milliGRAM(s)/deciliter  oxyCODONE    IR 10 milliGRAM(s) Oral four times a day PRN Moderate Pain (4 - 6)      LABS                          9.6    7.16  )-----------( 239      ( 27 May 2023 11:00 )             33.6     05-27    140  |  96<L>  |  26<H>  ----------------------------<  119<H>  3.6   |  33<H>  |  1.2    Ca    8.6      27 May 2023 11:00  Mg     1.7     05-27    TPro  5.8<L>  /  Alb  2.9<L>  /  TBili  1.4<H>  /  DBili  x   /  AST  20  /  ALT  19  /  AlkPhos  100  05-27          Lactate Trend  05-24 @ 16:00 Lactate:1.1   05-24 @ 04:30 Lactate:2.5   05-22 @ 18:22 Lactate:2.1         CAPILLARY BLOOD GLUCOSE      POCT Blood Glucose.: 121 mg/dL (27 May 2023 11:47)        RADIOLOGY & ADDITIONAL TESTS:    Imaging Personally Reviewed:  [ ] YES  [ ] NO    HEALTH ISSUES - PROBLEM Dx:  Acute on chronic HFrEF (heart failure with reduced ejection fraction)    Cardiorenal syndrome    Transaminitis    Advanced care planning/counseling discussion    Encounter for palliative care    S/P AKA (above knee amputation), right    Anxiety    Altered mental status            PHYSICAL EXAM:  GENERAL: weak, mild malnourished   HEAD:  Atraumatic, Normocephalic.  EYES: EOMI, PERRLA, conjunctiva and sclera clear.  NECK: Supple, No JVD.  CHEST/LUNG: Clear to auscultation bilaterally; No wheeze.  HEART: Regular rate and rhythm; S1 S2.   ABDOMEN: Soft, Nontender, Nondistended; Bowel sounds present.  EXTREMITIES: right above knee amputation, small LLE healing ulcers.   PSYCH: Awake, mildly confused.   NEUROLOGY: non-focal.  SKIN: No rashes or lesions.

## 2023-05-27 NOTE — PROGRESS NOTE ADULT - SUBJECTIVE AND OBJECTIVE BOX
FLOWER MARISCAL 67y Male  MRN#: [id number]   Hospital Day: 9d    SUBJECTIVE  Patient is a 67y old Male who presents with a chief complaint of Mechanical fall (26 May 2023 16:36)  Currently admitted to medicine with the primary diagnosis of Fall      INTERVAL HPI AND OVERNIGHT EVENTS:  Patient was examined and seen at bedside. This morning he is resting comfortably in bed. No issues or overnight events.    OBJECTIVE  PAST MEDICAL & SURGICAL HISTORY  Status post percutaneous transluminal coronary angioplasty  2 stents    Atherosclerosis of coronary artery  CAD (coronary artery disease)    Osteoarthritis    HLD (hyperlipidemia)    Diabetes  on insulin pump    CHF (congestive heart failure)  EF ~ 25%    History of celiac disease    Diverticulitis    STEMI (ST elevation myocardial infarction)    Diabetic neuropathy  Hands and Feet    Anxiety and depression    Other postprocedural status  Fixation hardware in foot LEFT    Stented coronary artery  10/18 heart attack  INFERIOR WALL MI    S/P CABG x 1  2018    S/P TKR (total knee replacement), right  with infection Mrsa   per pt he was cleared from MRSA infection    Surgery, elective  right knee wound debridement    History of open reduction and internal fixation (ORIF) procedure  right hip    H/O shoulder surgery  right    AICD (automatic cardioverter/defibrillator) present  [facility]    Cholecystostomy care  drain inserted 2020 & removed 4 months ago    History of tonsillectomy    History of hip replacement, total, right    Elective surgery  plastic surgery Left shin      ALLERGIES:  ACE inhibitors (Rash)  Entresto (Swelling)  Atorvastatin Calcium (Unknown)  Bactrim (Unknown)  Plavix (Unknown)    MEDICATIONS:  STANDING MEDICATIONS  aspirin enteric coated 81 milliGRAM(s) Oral daily  buMETAnide Injectable 2 milliGRAM(s) IV Push every 12 hours  chlorhexidine 2% Cloths 1 Application(s) Topical daily  dextrose 5% + sodium chloride 0.9%. 1000 milliLiter(s) IV Continuous <Continuous>  dextrose 5%. 1000 milliLiter(s) IV Continuous <Continuous>  dextrose 5%. 1000 milliLiter(s) IV Continuous <Continuous>  dextrose 50% Injectable 25 Gram(s) IV Push once  dextrose 50% Injectable 12.5 Gram(s) IV Push once  dextrose 50% Injectable 25 Gram(s) IV Push once  DULoxetine 30 milliGRAM(s) Oral <User Schedule>  folic acid 1 milliGRAM(s) Oral <User Schedule>  glucagon  Injectable 1 milliGRAM(s) IntraMuscular once  heparin   Injectable 5000 Unit(s) SubCutaneous every 12 hours  hydrocortisone 2.5% Cream 1 Application(s) Topical two times a day  insulin glargine Injectable (LANTUS) 12 Unit(s) SubCutaneous at bedtime  insulin lispro (ADMELOG) corrective regimen sliding scale   SubCutaneous three times a day before meals  iron sucrose IVPB 200 milliGRAM(s) IV Intermittent every 24 hours  lactulose Syrup 10 Gram(s) Oral at bedtime  levothyroxine 25 MICROGram(s) Oral <User Schedule>  levothyroxine 50 MICROGram(s) Oral <User Schedule>  methotrexate (Non - oncologic) 10 milliGRAM(s) Oral every week  metoprolol succinate ER 25 milliGRAM(s) Oral daily  midodrine. 5 milliGRAM(s) Oral <User Schedule>  pantoprazole    Tablet 40 milliGRAM(s) Oral before breakfast  polyethylene glycol 3350 17 Gram(s) Oral two times a day  prasugrel 10 milliGRAM(s) Oral daily  senna 2 Tablet(s) Oral at bedtime  sodium chloride 3%. 150 milliLiter(s) IV Continuous <Continuous>  sodium chloride 3%. 150 milliLiter(s) IV Continuous <Continuous>  sodium chloride 3%. 150 milliLiter(s) IV Continuous <Continuous>  sodium chloride 3%. 150 milliLiter(s) IV Continuous <Continuous>  sodium chloride 3%. 150 milliLiter(s) IV Continuous <Continuous>  sodium chloride 3%. 150 milliLiter(s) IV Continuous <Continuous>  sodium chloride 3%. 150 milliLiter(s) IV Continuous <Continuous>  sodium chloride 3%. 150 milliLiter(s) IV Continuous <Continuous>    PRN MEDICATIONS  acetaminophen     Tablet .. 325 milliGRAM(s) Oral every 4 hours PRN  dextrose Oral Gel 15 Gram(s) Oral once PRN  oxyCODONE    IR 10 milliGRAM(s) Oral four times a day PRN      VITAL SIGNS: Last 24 Hours  T(C): 36 (27 May 2023 05:03), Max: 36.7 (26 May 2023 19:24)  T(F): 96.8 (27 May 2023 05:03), Max: 98 (26 May 2023 19:24)  HR: 82 (27 May 2023 05:03) (81 - 82)  BP: 113/77 (27 May 2023 05:03) (101/57 - 113/77)  BP(mean): 78 (26 May 2023 19:24) (78 - 78)  RR: 18 (27 May 2023 05:03) (18 - 22)  SpO2: --    LABS:                        11.1   5.86  )-----------( x        ( 27 May 2023 04:30 )             39.2     05-25    141  |  98  |  37<H>  ----------------------------<  193<H>  4.2   |  31  |  1.4    Ca    8.5      25 May 2023 21:54  Mg     2.0     05-25    Physical Exam:  CONSTITUTIONAL: No acute distress, alert and following commands  HEAD: Atraumatic, normocephalic  EYES: EOM intact, PERRLA, conjunctiva and sclera clear  PULMONARY: clear  CARDIOVASCULAR: Regular rate and rhythm  GASTROINTESTINAL: Soft, non-tender, non-distended; bowel sounds present  NEUROLOGY: non-focal, AxO= 3

## 2023-05-27 NOTE — PROGRESS NOTE ADULT - ASSESSMENT
66 yo M PMHx chronic HFrEF 15-20% s/p ICD, decreased RV function, mild MR/TR, DM type I w/ neuropathy and hx hypoglycemia, DLD, HTN, CAD, hx MI, s/p CABG, s/p stent (2018), hx in-stent thrombosis while on Plavix, PAD s/p 3 LLE stents, TIAGO (needs CPAP auto-regulating pressure 10-16, patient doesn't use it due to claustrophobia), Psoriasis, R-AKA presenting to ED s/p fall. He states he was lying in bed at home when he fell asleep and fell off the bed. Found to have moises and mild elevation of trop     A/P:   Acute on Chronic HFrEF:   Patient with SOB, Pro-BNP 9100  CT chest and abdomen showed moderate tp large bilateral pleural effusions, right greater than left. Small to moderate volume abdominopelvic ascites.  Echo 5/24/23 showed LVEF <20%, mod MR, mod to severe TR.   Continue Bumex 2mg IV BID and Hypertonic saline 3% BID.   Continue Metoprolol. On Midodrine, wean off   had "anaphlyatic reaction" to ACE-I (presumably angioedema?) and has allergy to Entresto  Low sodium diet, fluid restriction 1.2L/day,     Delirium: likely from hospital stay and medical problem  Seen by psych,     CAD s/p PCI  Type 2 NSTEMI due to demand ischemia:   Troponin 0.09>0.10  EKG showed paced rhythm.   Continue Aspirin, Prasugrel, Metoprolol, add Lipitor 40mg po daily.    Mechanical Fall   No CT evidence for acute traumatic injury within the chest, abdomen or pelvis  Fall Precaution, PT follow up.     MOISES on CKD III  likely cardiorenal vs obstruction.   Cr increased to 2.6, now improving to 1.2, baseline 1.1  Kidney US:  showed Mild left hydronephrosis/dilated pelvis, unchanged. Left lower pole 0.5 cm nonobstructing calculus.  s/p Loja placement.     Iron deficiency anemia:   Iron studies reviewed Iron level 13, Iron sat 3%  Continue Venofer 200mg IV daily for 5 days.      Transaminitis  Mild elevation, AST/ALT< resolved, likely congestive hepatopathy.     DM type 1 complicated by hypoglycemia  episodes of hypoglycemia at home  Continue Lantus and insulin sliding scale.     Hypothyroidism: continue Synthroid    Recent dx of RA   Cont Methotrexate weekly.     DVT prophylaxis - lovenox   full code    Very poor prognosis, advanced heart failure, recurrent admission, palliative follow up.   #Progress Note Handoff:  Pending (specify):  improving volume overload.   Family discussion: with his wife, update about echo result,   Disposition: Home vs SNF.

## 2023-05-28 NOTE — PROGRESS NOTE ADULT - ASSESSMENT
66 yo M PMHx chronic HFrEF 15-20% s/p ICD, decreased RV function, mild MR/TR, DM type I w/ neuropathy and hx hypoglycemia, DLD, HTN, CAD, hx MI, s/p CABG, s/p stent (2018), hx in-stent thrombosis while on Plavix, PAD s/p 3 LLE stents, TIAGO (needs CPAP auto-regulating pressure 10-16, patient doesn't use it due to claustrophobia), Psoriasis, R-AKA presenting to ED s/p fall. He states he was lying in bed at home when he fell asleep and fell off the bed. Found to have moises and mild elevation of trop     A/P:   Acute on Chronic HFrEF:   Patient with SOB, Pro-BNP 9100  CT chest and abdomen showed moderate tp large bilateral pleural effusions, right greater than left. Small to moderate volume abdominopelvic ascites.  Echo 5/24/23 showed LVEF <20%, mod MR, mod to severe TR.   Continue Bumex 2mg IV BID and Hypertonic saline 3% BID. Start on Torsemide 20mg daily on 5/29 per heart failure teaem.   Continue Metoprolol. On Midodrine, wean off   had "anaphlyatic reaction" to ACE-I (presumably angioedema?) and has allergy to Entresto  Low sodium diet, fluid restriction 1.2L/day,     Delirium: likely from hospital stay and medical problem  Seen by bennett mendoza.     CAD s/p PCI  Type 2 NSTEMI due to demand ischemia:   Troponin 0.09>0.10  EKG showed paced rhythm.   Continue Aspirin, Prasugrel, Metoprolol, add Lipitor 40mg po daily.    Mechanical Fall   No CT evidence for acute traumatic injury within the chest, abdomen or pelvis  Fall Precaution, PT follow up.     MOISES on CKD III  likely cardiorenal vs obstruction.   Cr increased to 2.6, now improving to 1.2, baseline 1.1  Kidney US:  showed Mild left hydronephrosis/dilated pelvis, unchanged. Left lower pole 0.5 cm nonobstructing calculus.  s/p Loja placement.     Iron deficiency anemia:   Iron studies reviewed Iron level 13, Iron sat 3%  Continue Venofer 200mg IV daily for 5 days.      Transaminitis  Mild elevation, AST/ALT< resolved, likely congestive hepatopathy.     DM type 1 complicated by hypoglycemia:  episodes of hypoglycemia at home  Continue Lantus and insulin sliding scale.     Hypothyroidism: continue Synthroid    Recent dx of RA   Cont Methotrexate weekly.     DVT prophylaxis - lovenox   full code    Very poor prognosis, advanced heart failure, recurrent admission, palliative follow up.   #Progress Note Handoff:  Pending (specify):  improving volume overload.   Family discussion: with his wife, update about echo result,   Disposition: Home vs SNF.

## 2023-05-28 NOTE — PROGRESS NOTE ADULT - SUBJECTIVE AND OBJECTIVE BOX
FLOWER MARISCAL  67y  Male      Patient is a 67y old  Male who presents with a chief complaint of Mechanical fall (27 May 2023 14:27)      INTERVAL HPI/OVERNIGHT EVENTS:  He feels ok, no new complaints.   Vital Signs Last 24 Hrs  T(C): 36.2 (28 May 2023 13:26), Max: 36.6 (27 May 2023 14:17)  T(F): 97.2 (28 May 2023 13:26), Max: 97.8 (27 May 2023 14:17)  HR: 82 (28 May 2023 13:26) (80 - 89)  BP: 100/61 (28 May 2023 13:26) (100/61 - 125/69)  BP(mean): 84 (27 May 2023 20:00) (84 - 84)  RR: 18 (28 May 2023 13:26) (18 - 18)  SpO2: 98% (27 May 2023 14:17) (98% - 98%)    Parameters below as of 27 May 2023 14:17  Patient On (Oxygen Delivery Method): nasal cannula  O2 Flow (L/min): 2        05-27-23 @ 07:01  -  05-28-23 @ 07:00  --------------------------------------------------------  IN: 240 mL / OUT: 2300 mL / NET: -2060 mL            Consultant(s) Notes Reviewed:  [x ] YES  [ ] NO          MEDICATIONS  (STANDING):  aspirin enteric coated 81 milliGRAM(s) Oral daily  buMETAnide Injectable 2 milliGRAM(s) IV Push every 12 hours  chlorhexidine 2% Cloths 1 Application(s) Topical daily  dextrose 5% + sodium chloride 0.9%. 1000 milliLiter(s) (50 mL/Hr) IV Continuous <Continuous>  dextrose 5%. 1000 milliLiter(s) (100 mL/Hr) IV Continuous <Continuous>  dextrose 5%. 1000 milliLiter(s) (50 mL/Hr) IV Continuous <Continuous>  dextrose 50% Injectable 25 Gram(s) IV Push once  dextrose 50% Injectable 12.5 Gram(s) IV Push once  dextrose 50% Injectable 25 Gram(s) IV Push once  diazepam    Tablet 1 milliGRAM(s) Oral at bedtime  DULoxetine 30 milliGRAM(s) Oral <User Schedule>  folic acid 1 milliGRAM(s) Oral <User Schedule>  glucagon  Injectable 1 milliGRAM(s) IntraMuscular once  heparin   Injectable 5000 Unit(s) SubCutaneous every 12 hours  hydrocortisone 2.5% Cream 1 Application(s) Topical two times a day  insulin glargine Injectable (LANTUS) 12 Unit(s) SubCutaneous at bedtime  insulin lispro (ADMELOG) corrective regimen sliding scale   SubCutaneous three times a day before meals  lactulose Syrup 10 Gram(s) Oral at bedtime  levothyroxine 25 MICROGram(s) Oral <User Schedule>  levothyroxine 50 MICROGram(s) Oral <User Schedule>  magnesium sulfate  IVPB 2 Gram(s) IV Intermittent once  methotrexate (Non - oncologic) 10 milliGRAM(s) Oral every week  metoprolol succinate ER 25 milliGRAM(s) Oral daily  midodrine. 5 milliGRAM(s) Oral <User Schedule>  pantoprazole    Tablet 40 milliGRAM(s) Oral before breakfast  polyethylene glycol 3350 17 Gram(s) Oral two times a day  prasugrel 10 milliGRAM(s) Oral daily  senna 2 Tablet(s) Oral at bedtime  sodium chloride 3%. 150 milliLiter(s) (50 mL/Hr) IV Continuous <Continuous>  sodium chloride 3%. 150 milliLiter(s) (50 mL/Hr) IV Continuous <Continuous>  sodium chloride 3%. 150 milliLiter(s) (50 mL/Hr) IV Continuous <Continuous>  sodium chloride 3%. 150 milliLiter(s) (50 mL/Hr) IV Continuous <Continuous>  sodium chloride 3%. 150 milliLiter(s) (50 mL/Hr) IV Continuous <Continuous>  sodium chloride 3%. 150 milliLiter(s) (50 mL/Hr) IV Continuous <Continuous>  sodium chloride 3%. 150 milliLiter(s) (50 mL/Hr) IV Continuous <Continuous>  sodium chloride 3%. 150 milliLiter(s) (50 mL/Hr) IV Continuous <Continuous>  sodium chloride 3%. 150 milliLiter(s) (50 mL/Hr) IV Continuous <Continuous>  sodium chloride 3%. 150 milliLiter(s) (50 mL/Hr) IV Continuous <Continuous>    MEDICATIONS  (PRN):  acetaminophen     Tablet .. 325 milliGRAM(s) Oral every 4 hours PRN Moderate Pain (4 - 6)  dextrose Oral Gel 15 Gram(s) Oral once PRN Blood Glucose LESS THAN 70 milliGRAM(s)/deciliter  oxyCODONE    IR 10 milliGRAM(s) Oral four times a day PRN Moderate Pain (4 - 6)      LABS                          9.9    7.74  )-----------( 254      ( 28 May 2023 04:30 )             34.9     05-28    143  |  97<L>  |  24<H>  ----------------------------<  74  3.5   |  35<H>  |  1.3    Ca    8.8      28 May 2023 04:30  Mg     2.1     05-28    TPro  5.8<L>  /  Alb  2.9<L>  /  TBili  1.4<H>  /  DBili  x   /  AST  20  /  ALT  19  /  AlkPhos  100  05-27          Lactate Trend  05-24 @ 16:00 Lactate:1.1   05-24 @ 04:30 Lactate:2.5         CAPILLARY BLOOD GLUCOSE      POCT Blood Glucose.: 94 mg/dL (28 May 2023 07:17)        RADIOLOGY & ADDITIONAL TESTS:    Imaging Personally Reviewed:  [ ] YES  [ ] NO    HEALTH ISSUES - PROBLEM Dx:  Acute on chronic HFrEF (heart failure with reduced ejection fraction)    Cardiorenal syndrome    Transaminitis    Advanced care planning/counseling discussion    Encounter for palliative care    S/P AKA (above knee amputation), right    Anxiety    Altered mental status            PHYSICAL EXAM:  GENERAL: weak, mild malnourished   HEAD:  Atraumatic, Normocephalic.  EYES: EOMI, PERRLA, conjunctiva and sclera clear.  NECK: Supple, No JVD.  CHEST/LUNG: Clear to auscultation bilaterally; No wheeze.  HEART: Regular rate and rhythm; S1 S2.   ABDOMEN: Soft, Nontender, Nondistended; Bowel sounds present.  EXTREMITIES: right above knee amputation, small LLE healing ulcers.   PSYCH: Awake, mildly confused.   NEUROLOGY: non-focal.  SKIN: No rashes or lesions.

## 2023-05-29 NOTE — DISCHARGE NOTE PROVIDER - HOSPITAL COURSE
68 yo M PMHx chronic HFrEF 15-20% s/p ICD, decreased RV function, mild MR/TR, DM type I w/ neuropathy and hx hypoglycemia, DLD, HTN, CAD, hx MI, s/p CABG, s/p stent (2018), hx in-stent thrombosis while on Plavix, PAD s/p 3 LLE stents, TIAGO (needs CPAP auto-regulating pressure 10-16, patient doesn't use it due to claustrophobia), Psoriasis, R-AKA presenting to ED s/p fall. He states he was lying in bed at home when he fell asleep and fell off the bed. Found to have moises and mild elevation of trop     A/P:   Acute on Chronic HFrEF:   Patient with SOB, Pro-BNP 9100  CT chest and abdomen showed moderate tp large bilateral pleural effusions, right greater than left. Small to moderate volume abdominopelvic ascites.  CXR 5/26 showed worsening pleural effusion and interstitial edema.   Echo 5/24/23 showed LVEF <20%, mod MR, mod to severe TR.   s/p Bumex 2mg IV BID and Hypertonic saline 3% BID. Start on Torsemide 20mg daily on 5/29 per heart failure team.   Continue Metoprolol. On Midodrine, wean off   History of anaphylactic reaction" to ACE-I (presumably angioedema?) and has allergy to Entresto  Low sodium diet, fluid restriction 1.2L/day,     Delirium: likely from hospital stay and medical problem  Seen by psych, stable.     CAD s/p PCI  Type 2 NSTEMI due to demand ischemia:   Troponin 0.09>0.10  EKG showed paced rhythm.   Continue Aspirin, Prasugrel, Metoprolol, add Lipitor 40mg po daily.    Mechanical Fall   No CT evidence for acute traumatic injury within the chest, abdomen or pelvis  Fall Precaution, PT follow up.     MOISES on CKD III  likely cardiorenal vs obstruction.   Cr increased to 2.6, now improving to 1.2, baseline 1.1  Kidney US:  showed Mild left hydronephrosis/dilated pelvis, unchanged. Left lower pole 0.5 cm nonobstructive calculus.  s/p Loja placement.     Iron deficiency anemia:   Iron studies reviewed Iron level 13, Iron sat 3%  Continue Venofer 200mg IV daily for 5 days.      Transaminitis  Mild elevation, AST/ALT< resolved, likely congestive hepatopathy.     DM type 1 complicated by hypoglycemia:  episodes of hypoglycemia at home  Continue Lantus and insulin sliding scale.     Hypothyroidism: continue Synthroid    Recent dx of RA   Cont Methotrexate weekly.     DVT prophylaxis: Heparin SC.   full code   66 yo M PMHx chronic HFrEF 15-20% s/p ICD, decreased RV function, mild MR/TR, DM type I w/ neuropathy and hx hypoglycemia, DLD, HTN, CAD, hx MI, s/p CABG, s/p stent (2018), hx in-stent thrombosis while on Plavix, PAD s/p 3 LLE stents, TIAGO (needs CPAP auto-regulating pressure 10-16, patient doesn't use it due to claustrophobia), Psoriasis, R-AKA presenting to ED s/p fall. He states he was lying in bed at home when he fell asleep and fell off the bed. Found to have moises and mild elevation of trop     A/P:   Acute on Chronic HFrEF:   Patient with SOB, Pro-BNP 9100  CT chest and abdomen showed moderate tp large bilateral pleural effusions, right greater than left. Small to moderate volume abdominopelvic ascites.  CXR 5/26 showed worsening pleural effusion and interstitial edema.   Echo 5/24/23 showed LVEF <20%, mod MR, mod to severe TR.   s/p Bumex 2mg IV BID and Hypertonic saline 3% BID. discharge on torsemide 20mg daily. restart hydralazine 10mg. F/u Fernandez 1-2 weeks  Continue Metoprolol.   History of anaphylactic reaction" to ACE-I (presumably angioedema?) and has allergy to Entresto  Low sodium diet, fluid restriction 1.2L/day,     Delirium: likely from hospital stay and medical problem  Seen by psych, stable.     CAD s/p PCI  Type 2 NSTEMI due to demand ischemia:   Troponin 0.09>0.10  EKG showed paced rhythm.   Continue Aspirin, Prasugrel, Metoprolol, add Lipitor 40mg po daily.    Mechanical Fall   No CT evidence for acute traumatic injury within the chest, abdomen or pelvis  Fall Precaution, PT follow up.     MOISES on CKD III  likely cardiorenal vs obstruction.   Cr increased to 2.6, now improving to 1.2, baseline 1.1  Kidney US:  showed Mild left hydronephrosis/dilated pelvis, unchanged. Left lower pole 0.5 cm nonobstructive calculus.       Iron deficiency anemia:   Iron studies reviewed Iron level 13, Iron sat 3%  Continue Venofer 200mg IV daily for 5 days.      Transaminitis  Mild elevation, AST/ALT< resolved, likely congestive hepatopathy.     Patient is medically stabilized and cleared for discharge

## 2023-05-29 NOTE — DISCHARGE NOTE PROVIDER - NSDCFUSCHEDAPPT_GEN_ALL_CORE_FT
Pavan Bryant  Health system Physician [facility]  GENSURG [address]  Scheduled Appointment: 06/12/2023    Brent Allen  Health system Physician [facility] [address]  Scheduled Appointment: 06/27/2023     Brent Allen  [facility]  RHEUM [address]  Scheduled Appointment: 06/27/2023

## 2023-05-29 NOTE — PROGRESS NOTE ADULT - SUBJECTIVE AND OBJECTIVE BOX
FLOWER MARISCAL  67y  Male      Patient is a 67y old  Male who presents with a chief complaint of Mechanical fall (29 May 2023 10:36)      INTERVAL HPI/OVERNIGHT EVENTS:  He feels confused, still with hallucination.   Vital Signs Last 24 Hrs  T(C): 35.3 (29 May 2023 04:34), Max: 36.2 (28 May 2023 13:26)  T(F): 95.6 (29 May 2023 04:34), Max: 97.2 (28 May 2023 13:26)  HR: 96 (29 May 2023 04:34) (82 - 96)  BP: 107/68 (29 May 2023 04:34) (100/61 - 111/60)  BP(mean): --  RR: 18 (29 May 2023 04:34) (18 - 18)  SpO2: --          05-28-23 @ 07:01  -  05-29-23 @ 07:00  --------------------------------------------------------  IN: 0 mL / OUT: 3100 mL / NET: -3100 mL            Consultant(s) Notes Reviewed:  [x ] YES  [ ] NO          MEDICATIONS  (STANDING):  aspirin enteric coated 81 milliGRAM(s) Oral daily  chlorhexidine 2% Cloths 1 Application(s) Topical daily  dextrose 5% + sodium chloride 0.9%. 1000 milliLiter(s) (50 mL/Hr) IV Continuous <Continuous>  dextrose 5%. 1000 milliLiter(s) (100 mL/Hr) IV Continuous <Continuous>  dextrose 5%. 1000 milliLiter(s) (50 mL/Hr) IV Continuous <Continuous>  dextrose 50% Injectable 25 Gram(s) IV Push once  dextrose 50% Injectable 12.5 Gram(s) IV Push once  dextrose 50% Injectable 25 Gram(s) IV Push once  diazepam    Tablet 1 milliGRAM(s) Oral at bedtime  DULoxetine 30 milliGRAM(s) Oral <User Schedule>  folic acid 1 milliGRAM(s) Oral <User Schedule>  glucagon  Injectable 1 milliGRAM(s) IntraMuscular once  heparin   Injectable 5000 Unit(s) SubCutaneous every 12 hours  hydrocortisone 2.5% Cream 1 Application(s) Topical two times a day  insulin glargine Injectable (LANTUS) 12 Unit(s) SubCutaneous at bedtime  insulin lispro (ADMELOG) corrective regimen sliding scale   SubCutaneous three times a day before meals  lactulose Syrup 10 Gram(s) Oral at bedtime  levothyroxine 25 MICROGram(s) Oral <User Schedule>  levothyroxine 50 MICROGram(s) Oral <User Schedule>  magnesium sulfate  IVPB 2 Gram(s) IV Intermittent once  methotrexate (Non - oncologic) 10 milliGRAM(s) Oral every week  metoprolol succinate ER 25 milliGRAM(s) Oral daily  midodrine. 5 milliGRAM(s) Oral <User Schedule>  pantoprazole    Tablet 40 milliGRAM(s) Oral before breakfast  polyethylene glycol 3350 17 Gram(s) Oral two times a day  prasugrel 10 milliGRAM(s) Oral daily  senna 2 Tablet(s) Oral at bedtime  sodium chloride 3%. 150 milliLiter(s) (50 mL/Hr) IV Continuous <Continuous>  sodium chloride 3%. 150 milliLiter(s) (50 mL/Hr) IV Continuous <Continuous>  sodium chloride 3%. 150 milliLiter(s) (50 mL/Hr) IV Continuous <Continuous>  sodium chloride 3%. 150 milliLiter(s) (50 mL/Hr) IV Continuous <Continuous>  sodium chloride 3%. 150 milliLiter(s) (50 mL/Hr) IV Continuous <Continuous>  sodium chloride 3%. 150 milliLiter(s) (50 mL/Hr) IV Continuous <Continuous>  sodium chloride 3%. 150 milliLiter(s) (50 mL/Hr) IV Continuous <Continuous>  sodium chloride 3%. 150 milliLiter(s) (50 mL/Hr) IV Continuous <Continuous>  sodium chloride 3%. 150 milliLiter(s) (50 mL/Hr) IV Continuous <Continuous>  torsemide 20 milliGRAM(s) Oral daily    MEDICATIONS  (PRN):  acetaminophen     Tablet .. 325 milliGRAM(s) Oral every 4 hours PRN Moderate Pain (4 - 6)  dextrose Oral Gel 15 Gram(s) Oral once PRN Blood Glucose LESS THAN 70 milliGRAM(s)/deciliter  oxyCODONE    IR 10 milliGRAM(s) Oral four times a day PRN Moderate Pain (4 - 6)      LABS                          10.4   9.26  )-----------( 234      ( 29 May 2023 06:24 )             36.1     05-29    143  |  99  |  21<H>  ----------------------------<  42<LL>  3.8   |  32  |  1.3    Ca    8.7      29 May 2023 06:24  Mg     2.0     05-29            Lactate Trend  05-24 @ 16:00 Lactate:1.1         CAPILLARY BLOOD GLUCOSE      POCT Blood Glucose.: 84 mg/dL (29 May 2023 11:21)        RADIOLOGY & ADDITIONAL TESTS:    Imaging Personally Reviewed:  [ ] YES  [ ] NO    HEALTH ISSUES - PROBLEM Dx:  Acute on chronic HFrEF (heart failure with reduced ejection fraction)    Cardiorenal syndrome    Transaminitis    Advanced care planning/counseling discussion    Encounter for palliative care    S/P AKA (above knee amputation), right    Anxiety    Altered mental status            PHYSICAL EXAM:  GENERAL: weak, mild malnourished   HEAD:  Atraumatic, Normocephalic.  EYES: EOMI, PERRLA, conjunctiva and sclera clear.  NECK: Supple, elevated JVP.   CHEST/LUNG: decrease breath sounds on lower lungs, bilateral rales.   HEART: Regular rate and rhythm; S1 S2.   ABDOMEN: Soft, Nontender, Nondistended; Bowel sounds present.  EXTREMITIES: right above knee amputation, small LLE healing ulcers.   PSYCH: Awake, mildly confused.   NEUROLOGY: non-focal.  SKIN: No rashes or lesions.

## 2023-05-29 NOTE — DISCHARGE NOTE PROVIDER - NSDCMRMEDTOKEN_GEN_ALL_CORE_FT
aspirin 81 mg oral delayed release tablet: 1 tab(s) orally once a day  diazePAM 2 mg oral tablet: 0.5 tab(s) orally once a day (at bedtime)  DULoxetine 30 mg oral delayed release capsule: 1 cap(s) orally 3 times a day  insulin glargine 100 units/mL subcutaneous solution: 25 unit(s) subcutaneous once a day (at bedtime)  insulin glargine 100 units/mL subcutaneous solution: 40 microcurie subcutaneous once a day  insulin lispro 100 units/mL injectable solution: if your finger stick is 150-199 please inject 2 units  if your finger stick is 200-250 please inject 4 units  if your finger stick is 251-300 please inject 6 units  if your finger stick is 301-350 please inject 8 units  if your finger stick is more than 350 please call your physician     levothyroxine 25 mcg (0.025 mg) oral tablet: 1 tab(s) orally 6 times a week  levothyroxine 50 mcg (0.05 mg) oral tablet: 1 tab(s) orally once a week  metoprolol succinate 25 mg oral tablet, extended release: 1 tab(s) orally once a day  Multiple Vitamins oral tablet: 1 tab(s) orally once a day  pantoprazole 40 mg oral delayed release tablet: 1 tab(s) orally once a day (before a meal)  prasugrel 10 mg oral tablet: 1 tab(s) orally once a day  rosuvastatin 40 mg oral tablet: 1 tab(s) orally once a day  Soaanz 20 mg oral tablet: Please take two 20 mg tablets once daily. (total of 40mg once daily).  spironolactone 25 mg oral tablet: 1 tab(s) orally once a day   aspirin 81 mg oral delayed release tablet: 1 tab(s) orally once a day  diazePAM 2 mg oral tablet: 0.5 tab(s) orally once a day (at bedtime)  DULoxetine 30 mg oral delayed release capsule: 1 cap(s) orally 3 times a day  insulin glargine 100 units/mL subcutaneous solution: 25 unit(s) subcutaneous once a day (at bedtime)  insulin glargine 100 units/mL subcutaneous solution: 40 microcurie subcutaneous once a day  insulin lispro 100 units/mL injectable solution: if your finger stick is 150-199 please inject 2 units  if your finger stick is 200-250 please inject 4 units  if your finger stick is 251-300 please inject 6 units  if your finger stick is 301-350 please inject 8 units  if your finger stick is more than 350 please call your physician     levothyroxine 25 mcg (0.025 mg) oral tablet: 1 tab(s) orally 6 times a week  levothyroxine 50 mcg (0.05 mg) oral tablet: 1 tab(s) orally once a week  metoprolol succinate 25 mg oral tablet, extended release: 1 tab(s) orally once a day  midodrine 5 mg oral tablet: 1 tab(s) orally 2 times a day  Multiple Vitamins oral tablet: 1 tab(s) orally once a day  pantoprazole 40 mg oral delayed release tablet: 1 tab(s) orally once a day (before a meal)  prasugrel 10 mg oral tablet: 1 tab(s) orally once a day  QUEtiapine 25 mg oral tablet: 1 tab(s) orally once a day  rosuvastatin 40 mg oral tablet: 1 tab(s) orally once a day  Soaanz 20 mg oral tablet: Please take two 20 mg tablets once daily. (total of 40mg once daily).  spironolactone 25 mg oral tablet: 1 tab(s) orally once a day

## 2023-05-29 NOTE — PROGRESS NOTE ADULT - ASSESSMENT
66 yo M PMHx chronic HFrEF 15-20% s/p ICD, decreased RV function, mild MR/TR, DM type I w/ neuropathy and hx hypoglycemia, DLD, HTN, CAD, hx MI, s/p CABG, s/p stent (2018), hx in-stent thrombosis while on Plavix, PAD s/p 3 LLE stents, TIAGO (needs CPAP auto-regulating pressure 10-16, patient doesn't use it due to claustrophobia), Psoriasis, R-AKA presenting to ED s/p fall. He states he was lying in bed at home when he fell asleep and fell off the bed. Found to have moises and mild elevation of trop     A/P:   Acute on Chronic HFrEF:   Patient with SOB, Pro-BNP 9100  CT chest and abdomen showed moderate tp large bilateral pleural effusions, right greater than left. Small to moderate volume abdominopelvic ascites.  CXR 5/26 showed worsening pleural effusion and interstitial edema.   Echo 5/24/23 showed LVEF <20%, mod MR, mod to severe TR.   s/p Bumex 2mg IV BID and Hypertonic saline 3% BID. Start on Torsemide 20mg daily on 5/29 per heart failure team.   Continue Metoprolol. On Midodrine, wean off   History of anaphylactic reaction" to ACE-I (presumably angioedema?) and has allergy to Entresto  Low sodium diet, fluid restriction 1.2L/day,     Delirium: likely from hospital stay and medical problem  Seen by psych, stable.     CAD s/p PCI  Type 2 NSTEMI due to demand ischemia:   Troponin 0.09>0.10  EKG showed paced rhythm.   Continue Aspirin, Prasugrel, Metoprolol, add Lipitor 40mg po daily.    Mechanical Fall   No CT evidence for acute traumatic injury within the chest, abdomen or pelvis  Fall Precaution, PT follow up.     MOISES on CKD III  likely cardiorenal vs obstruction.   Cr increased to 2.6, now improving to 1.2, baseline 1.1  Kidney US:  showed Mild left hydronephrosis/dilated pelvis, unchanged. Left lower pole 0.5 cm nonobstructive calculus.  s/p Loja placement.     Iron deficiency anemia:   Iron studies reviewed Iron level 13, Iron sat 3%  Continue Venofer 200mg IV daily for 5 days.      Transaminitis  Mild elevation, AST/ALT< resolved, likely congestive hepatopathy.     DM type 1 complicated by hypoglycemia:  episodes of hypoglycemia at home  Continue Lantus and insulin sliding scale.     Hypothyroidism: continue Synthroid    Recent dx of RA   Cont Methotrexate weekly.     DVT prophylaxis: Heparin SC.   full code    Very poor prognosis, advanced heart failure, recurrent admission, palliative follow up.   #Progress Note Handoff:  Pending (specify):  improving volume overload.   Family discussion: with his wife, update about echo result,   Disposition: Home vs SNF.

## 2023-05-29 NOTE — PROGRESS NOTE ADULT - SUBJECTIVE AND OBJECTIVE BOX
Denies any chest pain, SOB or palpitations  Family by bedside        MEDICATIONS  (STANDING):  aspirin enteric coated 81 milliGRAM(s) Oral daily  chlorhexidine 2% Cloths 1 Application(s) Topical daily  dextrose 5% + sodium chloride 0.9%. 1000 milliLiter(s) (50 mL/Hr) IV Continuous <Continuous>  dextrose 5%. 1000 milliLiter(s) (50 mL/Hr) IV Continuous <Continuous>  dextrose 5%. 1000 milliLiter(s) (100 mL/Hr) IV Continuous <Continuous>  dextrose 50% Injectable 25 Gram(s) IV Push once  dextrose 50% Injectable 12.5 Gram(s) IV Push once  dextrose 50% Injectable 25 Gram(s) IV Push once  diazepam    Tablet 1 milliGRAM(s) Oral at bedtime  DULoxetine 30 milliGRAM(s) Oral <User Schedule>  folic acid 1 milliGRAM(s) Oral <User Schedule>  glucagon  Injectable 1 milliGRAM(s) IntraMuscular once  heparin   Injectable 5000 Unit(s) SubCutaneous every 12 hours  hydrocortisone 2.5% Cream 1 Application(s) Topical two times a day  insulin glargine Injectable (LANTUS) 12 Unit(s) SubCutaneous at bedtime  insulin lispro (ADMELOG) corrective regimen sliding scale   SubCutaneous three times a day before meals  lactulose Syrup 10 Gram(s) Oral at bedtime  levothyroxine 25 MICROGram(s) Oral <User Schedule>  levothyroxine 50 MICROGram(s) Oral <User Schedule>  magnesium sulfate  IVPB 2 Gram(s) IV Intermittent once  methotrexate (Non - oncologic) 10 milliGRAM(s) Oral every week  metoprolol succinate ER 25 milliGRAM(s) Oral daily  midodrine. 5 milliGRAM(s) Oral <User Schedule>  pantoprazole    Tablet 40 milliGRAM(s) Oral before breakfast  polyethylene glycol 3350 17 Gram(s) Oral two times a day  prasugrel 10 milliGRAM(s) Oral daily  senna 2 Tablet(s) Oral at bedtime  sodium chloride 3%. 150 milliLiter(s) (50 mL/Hr) IV Continuous <Continuous>  sodium chloride 3%. 150 milliLiter(s) (50 mL/Hr) IV Continuous <Continuous>  sodium chloride 3%. 150 milliLiter(s) (50 mL/Hr) IV Continuous <Continuous>  sodium chloride 3%. 150 milliLiter(s) (50 mL/Hr) IV Continuous <Continuous>  sodium chloride 3%. 150 milliLiter(s) (50 mL/Hr) IV Continuous <Continuous>  sodium chloride 3%. 150 milliLiter(s) (50 mL/Hr) IV Continuous <Continuous>  sodium chloride 3%. 150 milliLiter(s) (50 mL/Hr) IV Continuous <Continuous>  sodium chloride 3%. 150 milliLiter(s) (50 mL/Hr) IV Continuous <Continuous>  sodium chloride 3%. 150 milliLiter(s) (50 mL/Hr) IV Continuous <Continuous>  torsemide 20 milliGRAM(s) Oral daily    MEDICATIONS  (PRN):  acetaminophen     Tablet .. 325 milliGRAM(s) Oral every 4 hours PRN Moderate Pain (4 - 6)  dextrose Oral Gel 15 Gram(s) Oral once PRN Blood Glucose LESS THAN 70 milliGRAM(s)/deciliter  oxyCODONE    IR 10 milliGRAM(s) Oral four times a day PRN Moderate Pain (4 - 6)            Vital Signs Last 24 Hrs  T(C): 35.3 (29 May 2023 04:34), Max: 36.1 (28 May 2023 20:00)  T(F): 95.6 (29 May 2023 04:34), Max: 97 (28 May 2023 20:00)  HR: 96 (29 May 2023 04:34) (90 - 96)  BP: 107/68 (29 May 2023 04:34) (107/68 - 111/60)  BP(mean): --  RR: 18 (29 May 2023 04:34) (18 - 18)  SpO2: --         REVIEW OF SYSTEMS:  CONSTITUTIONAL: No fever, weight loss, or fatigue  CARDIOLOGY: Patient denies chest pain, shortness of breath or syncopal episodes.   RESPIRATORY: denies shortness of breath, wheezeing.   NEUROLOGICAL: No weakness, no focal deficits to report.  GI: no BRBPR, no N,V,diarrhea.    PSYCHIATRY: normal mood and affect  HEENT: no nasal discharge, no ecchymosis  SKIN: no ecchymosis, no breakdown  MUSCULOSKELETAL: Full range of motion x4.        PHYSICAL EXAM:  · CONSTITUTIONAL: mal nourished     ·RESPIRATORY:   airway patent; breath sounds equal; good air movement; respirations non-labored; Decreased BS bilaterally right more than left   · CARDIOVASCULAR	regular rate and rhythm  + YULISA   · EXTREMITIES: No cyanosis, clubbing or edema  · VASCULAR: 	right AKA   	  TELEMETRY: Paced rhythm       TTE: EF 15-20%     LABS:                        10.4   9.26  )-----------( 234      ( 29 May 2023 06:24 )             36.1     05-29    143  |  99  |  21<H>  ----------------------------<  42<LL>  3.8   |  32  |  1.3    Ca    8.7      29 May 2023 06:24  Mg     2.0     05-29              I&O's Summary    28 May 2023 07:01  -  29 May 2023 07:00  --------------------------------------------------------  IN: 0 mL / OUT: 3100 mL / NET: -3100 mL      BNP  RADIOLOGY & ADDITIONAL STUDIES:    IMPRESSION AND PLAN:      Continue current care as per medical team  GI/DVT prophylaxis   Keep negative balance  Pulmonary follow up   Physical therapy/Rehab   Will F/U

## 2023-05-29 NOTE — DISCHARGE NOTE PROVIDER - NSDCCPCAREPLAN_GEN_ALL_CORE_FT
PRINCIPAL DISCHARGE DIAGNOSIS  Diagnosis: Acute HF (heart failure)  Assessment and Plan of Treatment:       SECONDARY DISCHARGE DIAGNOSES  Diagnosis: MOISES (acute kidney injury)  Assessment and Plan of Treatment:     Diagnosis: Elevated troponin level  Assessment and Plan of Treatment:      PRINCIPAL DISCHARGE DIAGNOSIS  Diagnosis: Acute HF (heart failure)  Assessment and Plan of Treatment: Heart failure occurs when the heart muscle doesn't pump blood as well as it should. When this happens, blood often backs up and fluid can build up in the lungs, causing shortness of breath.  Certain heart conditions gradually leave the heart too weak or stiff to fill and pump blood properly. These conditions include narrowed arteries in the heart and high blood pressure.  Proper treatment may improve the symptoms of heart failure and may help some people live longer. Lifestyle changes can improve quality of life. Try to lose weight, exercise, use less salt and manage stress.  But heart failure can be life-threatening. People with heart failure may have severe symptoms. Some may need a heart transplant or a device to help the heart pump blood.  Continue torsemide 20mg daily - follow up with Dr Fernandez      SECONDARY DISCHARGE DIAGNOSES  Diagnosis: MOISES (acute kidney injury)  Assessment and Plan of Treatment: Acute kidney failure occurs when your kidneys suddenly become unable to filter waste products from your blood. When your kidneys lose their filtering ability, dangerous levels of wastes may accumulate, and your blood's chemical makeup may get out of balance.  Acute kidney failure — also called acute renal failure or acute kidney injury — develops rapidly, usually in less than a few days. Acute kidney failure is most common in people who are already hospitalized, particularly in critically ill people who need intensive care.

## 2023-05-30 NOTE — PROGRESS NOTE ADULT - SUBJECTIVE AND OBJECTIVE BOX
HPI:  67-year-old male w/ HFrEF 15-20% s/p ICD, decreased RV function, mild MR/TR, DM type I w/ neuropathy and hx hypoglycemia , DL, HTN, CAD, hx MI, s/p CABG, s/p stent (2018), hx in-stent thrombosis while on Plavix, PAD s/p 3 LLE stents, TIAGO (needs CPAP auto-regulating pressure 10-16, patient doesn't use it due to claustrophobia), Psoriasis, R-AKA presenting to ED s/p fall. He states he was lying in bed at home when he fell asleep and fell off the bed, and complains of generalized headache neck pain, non-radiating, no alleviating or aggravating factors, associated with right anterior chest wall pain. Denies LOC,  fevers, chills, nausea, vomiting, dizziness, abdominal pain, shortness of breath. States he had TDAP recently. Pt was not down for long as wife was upstairs when he fell off the bed and called EMS right away.     Interval History  - Appears in some emotional distress, and complaining of pain. Wife at bedside   ADVANCE DIRECTIVES:    [ x] Full Code [ ] DNR  MOLST  [ ]  Living Will  [ ]   DECISION MAKER(s):  [x ] Health Care Proxy(s)  [ ] Surrogate(s)  [ ] Guardian           Name(s): Phone Number(s):      Answers: Emergency Contact Name Raine,  Answers: Emergency Contact Phone # [phone],  Answers: Emergency Contact Relationship wife    BASELINE (I)ADL(s) (prior to admission):  Blanco: [ ]Total  [ ] Moderate [x ]Dependent    Allergies    ACE inhibitors (Rash)  Entresto (Swelling)  Atorvastatin Calcium (Unknown)  Bactrim (Unknown)  Plavix (Unknown)    Intolerances    MEDICATIONS  (STANDING):  aspirin enteric coated 81 milliGRAM(s) Oral daily  chlorhexidine 2% Cloths 1 Application(s) Topical daily  dextrose 5% + sodium chloride 0.9%. 1000 milliLiter(s) (50 mL/Hr) IV Continuous <Continuous>  dextrose 5%. 1000 milliLiter(s) (50 mL/Hr) IV Continuous <Continuous>  dextrose 5%. 1000 milliLiter(s) (100 mL/Hr) IV Continuous <Continuous>  dextrose 50% Injectable 25 Gram(s) IV Push once  dextrose 50% Injectable 12.5 Gram(s) IV Push once  dextrose 50% Injectable 25 Gram(s) IV Push once  diazepam    Tablet 1 milliGRAM(s) Oral at bedtime  DULoxetine 30 milliGRAM(s) Oral <User Schedule>  folic acid 1 milliGRAM(s) Oral <User Schedule>  glucagon  Injectable 1 milliGRAM(s) IntraMuscular once  heparin   Injectable 5000 Unit(s) SubCutaneous every 12 hours  hydrocortisone 2.5% Cream 1 Application(s) Topical two times a day  insulin glargine Injectable (LANTUS) 12 Unit(s) SubCutaneous at bedtime  insulin lispro (ADMELOG) corrective regimen sliding scale   SubCutaneous three times a day before meals  lactulose Syrup 10 Gram(s) Oral at bedtime  levothyroxine 25 MICROGram(s) Oral <User Schedule>  levothyroxine 50 MICROGram(s) Oral <User Schedule>  magnesium sulfate  IVPB 2 Gram(s) IV Intermittent once  methotrexate (Non - oncologic) 10 milliGRAM(s) Oral every week  metoprolol succinate ER 25 milliGRAM(s) Oral daily  midodrine. 5 milliGRAM(s) Oral <User Schedule>  pantoprazole    Tablet 40 milliGRAM(s) Oral before breakfast  polyethylene glycol 3350 17 Gram(s) Oral two times a day  prasugrel 10 milliGRAM(s) Oral daily  senna 2 Tablet(s) Oral at bedtime  sodium chloride 3%. 150 milliLiter(s) (50 mL/Hr) IV Continuous <Continuous>  sodium chloride 3%. 150 milliLiter(s) (50 mL/Hr) IV Continuous <Continuous>  sodium chloride 3%. 150 milliLiter(s) (50 mL/Hr) IV Continuous <Continuous>  sodium chloride 3%. 150 milliLiter(s) (50 mL/Hr) IV Continuous <Continuous>  sodium chloride 3%. 150 milliLiter(s) (50 mL/Hr) IV Continuous <Continuous>  sodium chloride 3%. 150 milliLiter(s) (50 mL/Hr) IV Continuous <Continuous>  sodium chloride 3%. 150 milliLiter(s) (50 mL/Hr) IV Continuous <Continuous>  sodium chloride 3%. 150 milliLiter(s) (50 mL/Hr) IV Continuous <Continuous>  sodium chloride 3%. 150 milliLiter(s) (50 mL/Hr) IV Continuous <Continuous>  torsemide 20 milliGRAM(s) Oral daily    MEDICATIONS  (PRN):  acetaminophen     Tablet .. 325 milliGRAM(s) Oral every 4 hours PRN Moderate Pain (4 - 6)  dextrose Oral Gel 15 Gram(s) Oral once PRN Blood Glucose LESS THAN 70 milliGRAM(s)/deciliter    PRESENT SYMPTOMS: [ ]Unable to obtain due to poor mentation   Source if other than patient:  [ ]Family   [ ]Team   Pain: []yes [x ]no- today     h/o pain as described below   QOL impact - significant   Location - left leg               Aggravating factors - none   Quality - sharp   Radiation - no  Timing- constant   Severity (0-10 scale): 9/10   Minimal acceptable level (0-10 scale): 4/10     Dyspnea:                           [ x]None[ ]Mild [ ]Moderate [ ]Severe   Anxiety:                             [ ]None[ ]Mild [ ]Moderate [ ]Severe   Fatigue:                             [ ]None[ ]Mild [ ]Moderate [x ]Severe   Nausea:                             [ ]None[ ]Mild [ ]Moderate [ ]Severe   Loss of appetite:              [ ]None[ ]Mild [ ]Moderate [ ]Severe   Constipation:                    [ ]None[ ]Mild [ ]Moderate [ ]Severe    Other Symptoms:  [ xAll other review of systems negative     Palliative Performance Status Version 2:         %    http://npcrc.org/files/news/palliative_performance_scale_[email or web address].pdf      PHYSICAL EXAM:  Vital Signs Last 24 Hrs  T(C): 36.1 (30 May 2023 05:24), Max: 36.1 (30 May 2023 05:24)  T(F): 96.9 (30 May 2023 05:24), Max: 96.9 (30 May 2023 05:24)  HR: 97 (30 ay 2023 05:24) (87 - 97)  BP: 102/66 (30 May 2023 05:24) (90/53 - 102/66)  BP(mean): --  RR: 19 (30 May 2023 05:24) (18 - 19)  SpO2: 98% (30 May 2023 05:24) (98% - 98%)    Parameters below as of 30 May 2023 05:24  Patient On (Oxygen Delivery Method): nasal cannula     I&O's Summary    29 May 2023 07:01  -  30 May 2023 07:00  --------------------------------------------------------  IN: 720 mL / OUT: 1900 mL / NET: -1180 mL    30 May 2023 07:01  -  30 May 2023 13:52  --------------------------------------------------------  IN: 120 mL / OUT: 0 mL / NET: 120 mL      GENERAL:  [ ] No acute distress [ ]Lethargic  [ ]Unarousable  [ ]Verbal  [ ]Non-Verbal [ ]Cachexia    BEHAVIORAL/PSYCH:  [x ]Alert and Oriented x  2[ ] Anxiety [ ] Delirium [ ] Agitation [ ] Calm   EYES: [ ] No scleral icterus [ ] Scleral icterus [ ] Closed  ENMT:  [ ]Dry mouth  [ ]No external oral lesions [ ] No external ear or nose lesions  CARDIOVASCULAR:  [ ]Regular [ ]Irregular [ ]Tachy [ ]Not Tachy  [ ][place] [ ] Edema [ ] No edema  PULMONARY:  [ ]Tachypnea  [ ]Audible excessive secretions [ x] No labored breathing [ ] labored breathing  GASTROINTESTINAL: [ x]Soft  [ ]Distended  [ ]Not distended [ ]Non tender [ ]Tender  MUSCULOSKELETAL: [ ]No clubbing [ ] clubbing  [ ] No cyanosis [ ] cyanosis  NEUROLOGIC: [ ]No focal deficits  [x ]Follows commands  [ ]Does not follow commands  [ ]Cognitive impairment  [ ]Dysphagia  [ ]Dysarthria  [ ]Paresis   SKIN: [ ] Jaundiced [x ] Non-jaundiced [ ]Rash [ ]No Rash [ ] Warm [ x] Multiple small sores   MISC/LINES: [ ] ET tube [ ] Trach [ ]NGT/OGT [ ]PEG [ x]Loja    CRITICAL CARE:  [ ] Shock Present  [ ]Septic [ ]Cardiogenic [ ]Neurologic [ ]Hypovolemic  [ ]  Vasopressors [ ]  Inotropes   [ ]Respiratory failure present [ ]Mechanical ventilation [ ]Non-invasive ventilatory support [ ]High flow  [ ]Acute  [ ]Chronic [ ]Hypoxic  [ ]Hypercarbic [ ]Other  [ ]Other organ failure        LABS: reviewed by me                        10.4   9.26  )-----------( 234      ( 29 May 2023 06:24 )             36.1   05-29    140  |  99  |  21<H>  ----------------------------<  109<H>  4.9   |  29  |  1.3    Ca    9.0      29 May 2023 17:56  Mg     2.0     05-29          RADIOLOGY & ADDITIONAL STUDIES: reviewed by me    EKG: reviewed by me  REFERRALS:   [ ]Chaplaincy  [ ]Hospice  [ ]Child Life  [x ]Social Work  [ x]Case management [ ]Holistic Therapy     Patient discussed with primary medical team MD  Palliative care education provided to patient and/or family    Goals of Care Document:

## 2023-05-30 NOTE — PROGRESS NOTE ADULT - SUBJECTIVE AND OBJECTIVE BOX
FLOWER MARISCAL  67y  Male      Patient is a 67y old  Male who presents with a chief complaint of Mechanical fall (29 May 2023 13:56)      INTERVAL HPI/OVERNIGHT EVENTS:  He is still with SOB, asking to go home.   Vital Signs Last 24 Hrs  T(C): 36.1 (30 May 2023 05:24), Max: 36.1 (30 May 2023 05:24)  T(F): 96.9 (30 May 2023 05:24), Max: 96.9 (30 May 2023 05:24)  HR: 97 (30 May 2023 05:24) (94 - 97)  BP: 102/66 (30 May 2023 05:24) (100/62 - 102/66)  BP(mean): --  RR: 19 (30 May 2023 05:24) (18 - 19)  SpO2: 98% (30 May 2023 05:24) (98% - 98%)    Parameters below as of 30 May 2023 05:24  Patient On (Oxygen Delivery Method): nasal cannula          05-29-23 @ 07:01 - 05-30-23 @ 07:00  --------------------------------------------------------  IN: 720 mL / OUT: 1900 mL / NET: -1180 mL    05-30-23 @ 07:01  - 05-30-23 @ 14:00  --------------------------------------------------------  IN: 120 mL / OUT: 0 mL / NET: 120 mL            Consultant(s) Notes Reviewed:  [x ] YES  [ ] NO          MEDICATIONS  (STANDING):  aspirin enteric coated 81 milliGRAM(s) Oral daily  chlorhexidine 2% Cloths 1 Application(s) Topical daily  dextrose 5% + sodium chloride 0.9%. 1000 milliLiter(s) (50 mL/Hr) IV Continuous <Continuous>  dextrose 5%. 1000 milliLiter(s) (100 mL/Hr) IV Continuous <Continuous>  dextrose 5%. 1000 milliLiter(s) (50 mL/Hr) IV Continuous <Continuous>  dextrose 50% Injectable 25 Gram(s) IV Push once  dextrose 50% Injectable 12.5 Gram(s) IV Push once  dextrose 50% Injectable 25 Gram(s) IV Push once  diazepam    Tablet 1 milliGRAM(s) Oral at bedtime  DULoxetine 30 milliGRAM(s) Oral <User Schedule>  folic acid 1 milliGRAM(s) Oral <User Schedule>  glucagon  Injectable 1 milliGRAM(s) IntraMuscular once  heparin   Injectable 5000 Unit(s) SubCutaneous every 12 hours  hydrocortisone 2.5% Cream 1 Application(s) Topical two times a day  insulin glargine Injectable (LANTUS) 12 Unit(s) SubCutaneous at bedtime  insulin lispro (ADMELOG) corrective regimen sliding scale   SubCutaneous three times a day before meals  lactulose Syrup 10 Gram(s) Oral at bedtime  levothyroxine 25 MICROGram(s) Oral <User Schedule>  levothyroxine 50 MICROGram(s) Oral <User Schedule>  magnesium sulfate  IVPB 2 Gram(s) IV Intermittent once  methotrexate (Non - oncologic) 10 milliGRAM(s) Oral every week  metoprolol succinate ER 25 milliGRAM(s) Oral daily  midodrine. 5 milliGRAM(s) Oral <User Schedule>  pantoprazole    Tablet 40 milliGRAM(s) Oral before breakfast  polyethylene glycol 3350 17 Gram(s) Oral two times a day  prasugrel 10 milliGRAM(s) Oral daily  senna 2 Tablet(s) Oral at bedtime  sodium chloride 3%. 150 milliLiter(s) (50 mL/Hr) IV Continuous <Continuous>  sodium chloride 3%. 150 milliLiter(s) (50 mL/Hr) IV Continuous <Continuous>  sodium chloride 3%. 150 milliLiter(s) (50 mL/Hr) IV Continuous <Continuous>  sodium chloride 3%. 150 milliLiter(s) (50 mL/Hr) IV Continuous <Continuous>  sodium chloride 3%. 150 milliLiter(s) (50 mL/Hr) IV Continuous <Continuous>  sodium chloride 3%. 150 milliLiter(s) (50 mL/Hr) IV Continuous <Continuous>  sodium chloride 3%. 150 milliLiter(s) (50 mL/Hr) IV Continuous <Continuous>  sodium chloride 3%. 150 milliLiter(s) (50 mL/Hr) IV Continuous <Continuous>  sodium chloride 3%. 150 milliLiter(s) (50 mL/Hr) IV Continuous <Continuous>  torsemide 20 milliGRAM(s) Oral daily    MEDICATIONS  (PRN):  acetaminophen     Tablet .. 325 milliGRAM(s) Oral every 4 hours PRN Moderate Pain (4 - 6)  dextrose Oral Gel 15 Gram(s) Oral once PRN Blood Glucose LESS THAN 70 milliGRAM(s)/deciliter      LABS                          10.4   9.26  )-----------( 234      ( 29 May 2023 06:24 )             36.1     05-29    140  |  99  |  21<H>  ----------------------------<  109<H>  4.9   |  29  |  1.3    Ca    9.0      29 May 2023 17:56  Mg     2.0     05-29            Lactate Trend        CAPILLARY BLOOD GLUCOSE      POCT Blood Glucose.: 99 mg/dL (30 May 2023 13:23)        RADIOLOGY & ADDITIONAL TESTS:    Imaging Personally Reviewed:  [ ] YES  [ ] NO    HEALTH ISSUES - PROBLEM Dx:  Acute on chronic HFrEF (heart failure with reduced ejection fraction)    Cardiorenal syndrome    Transaminitis    Advanced care planning/counseling discussion    Encounter for palliative care    S/P AKA (above knee amputation), right    Anxiety    Altered mental status            PHYSICAL EXAM:  GENERAL: weak, mild respiratory distress, mild malnourished   HEAD:  Atraumatic, Normocephalic.  EYES: EOMI, PERRLA, conjunctiva and sclera clear.  NECK: Supple, elevated JVP.   CHEST/LUNG: decrease breath sounds on lower lungs, bilateral rales.   HEART: Regular rate and rhythm; S1 S2.   ABDOMEN: Soft, Nontender, Nondistended; Bowel sounds present.  EXTREMITIES: right above knee amputation, small LLE healing ulcers.   PSYCH: Awake, mildly confused.   NEUROLOGY: non-focal.  SKIN: No rashes or lesions.

## 2023-05-30 NOTE — PROGRESS NOTE ADULT - PROBLEM SELECTOR PLAN 7
Likely from hospitalization? Underlying neurocognitive disorder   Behavioral Health Eval read and appreciated   - ongoing 1:1 assist w ADLs and eating etc. Safety precautions

## 2023-05-30 NOTE — PROGRESS NOTE ADULT - ASSESSMENT
68 yo M PMHx chronic HFrEF 15-20% s/p ICD, decreased RV function, mild MR/TR, DM type I w/ neuropathy and hx hypoglycemia, DLD, HTN, CAD, hx MI, s/p CABG, s/p stent (2018), hx in-stent thrombosis while on Plavix, PAD s/p 3 LLE stents, TIAGO (needs CPAP auto-regulating pressure 10-16, patient doesn't use it due to claustrophobia), Psoriasis, R-AKA presenting to ED s/p fall. He states he was lying in bed at home when he fell asleep and fell off the bed. Found to have moises and mild elevation of trop     A/P:   Acute on Chronic HFrEF:   Patient with SOB, Pro-BNP 9100  CT chest and abdomen showed moderate tp large bilateral pleural effusions, right greater than left. Small to moderate volume abdominopelvic ascites.  CXR 5/29 is still with bilateral pleural effusion and interstitial edema.   Echo 5/24/23 showed LVEF <20%, mod MR, mod to severe TR.   s/p Bumex 2mg IV BID and Hypertonic saline 3% BID. Start on Torsemide 20mg daily on 5/29 per heart failure team.   Continue Metoprolol. On Midodrine, wean off   History of anaphylactic reaction" to ACE-I (presumably angioedema?) and has allergy to Entresto  Low sodium diet, fluid restriction 1.2L/day,     Delirium: likely from hospital stay and medical problem  Seen by psych, stable.     CAD s/p PCI  Type 2 NSTEMI due to demand ischemia:   Troponin 0.09>0.10  EKG showed paced rhythm.   Continue Aspirin, Prasugrel, Metoprolol, add Lipitor 40mg po daily.    Mechanical Fall   No CT evidence for acute traumatic injury within the chest, abdomen or pelvis  Fall Precaution, PT follow up.     MOISES on CKD III  likely cardiorenal vs obstruction.   Cr increased to 2.6, now improving to 1.2, baseline 1.1  Kidney US:  showed Mild left hydronephrosis/dilated pelvis, unchanged. Left lower pole 0.5 cm nonobstructive calculus.  s/p Loja placement.     Iron deficiency anemia:   Iron studies reviewed Iron level 13, Iron sat 3%  Continue Venofer 200mg IV daily for 5 days.      Transaminitis  Mild elevation, AST/ALT< resolved, likely congestive hepatopathy.     DM type 1 complicated by hypoglycemia:  episodes of hypoglycemia at home  Continue Lantus and insulin sliding scale.     Hypothyroidism: continue Synthroid    Recent dx of RA   Cont Methotrexate weekly.     DVT prophylaxis: Heparin SC.   full code    Very poor prognosis, advanced heart failure, recurrent admission, palliative follow up.   #Progress Note Handoff:  Pending (specify):  improving volume overload, heart failure follow up.   Family discussion: with his wife, update about echo result,   Disposition: Home vs SNF.

## 2023-05-30 NOTE — PROGRESS NOTE ADULT - PROBLEM SELECTOR PLAN 3
Met with spouse again today after holiday weekend. She is afraid about level of care he will require at home, she needs more help. She would like to send him to SNF for rehab but says he gets too angry in those places. Support provided

## 2023-05-31 NOTE — PROGRESS NOTE ADULT - PROBLEM SELECTOR PLAN 7
Improving, pt has not had oxycodone 10mg in 2 days, improved mental status, but complaining of pain  Restart Oxycodone 5mg PO Q 6 hrs PRN for pain 4-10 monitor mental status

## 2023-05-31 NOTE — PROGRESS NOTE ADULT - PROBLEM SELECTOR PLAN 3
Spouse requesting SNF placement for skilled rehab and then D/C home. But she only wants one facility - [facility]. Case management working with her and the patient. Overall their goal is to have him return home able to transfer from bed to wheelchair and to commode. Also to attend their daughters wedding in September. Patient more alert and makes decisions with his spouse. Full code is his wish and wife will continue this unless his condition drastically declines. Then would revisit goals of care discussion

## 2023-05-31 NOTE — PROGRESS NOTE ADULT - SUBJECTIVE AND OBJECTIVE BOX
INTERNAL MEDICINE PROGRESS NOTE  FLOWER MARISCAL 67y Male  MRN#: [id number]     Hospital Day: 13d  Pt is currently admitted with the primary diagnosis of volume overload    SUBJECTIVE  Overnight events     Subjective complaints  Patient was evaluated this morning. Reports feeling short of breath. Pt on rom air.  Spoke with HF team for f/u recommendations for volume overload.                                           OBJECTIVE  PAST MEDICAL & SURGICAL HISTORY  Status post percutaneous transluminal coronary angioplasty  2 stents    Atherosclerosis of coronary artery  CAD (coronary artery disease)    Osteoarthritis    HLD (hyperlipidemia)    Diabetes  on insulin pump    CHF (congestive heart failure)  EF ~ 25%    History of celiac disease    Diverticulitis    STEMI (ST elevation myocardial infarction)    Diabetic neuropathy  Hands and Feet    Anxiety and depression    Other postprocedural status  Fixation hardware in foot LEFT    Stented coronary artery  10/18 heart attack  INFERIOR WALL MI    S/P CABG x 1  2018    S/P TKR (total knee replacement), right  with infection Mrsa   per pt he was cleared from MRSA infection    Surgery, elective  right knee wound debridement    History of open reduction and internal fixation (ORIF) procedure  right hip    H/O shoulder surgery  right    AICD (automatic cardioverter/defibrillator) present  [facility]    Cholecystostomy care  drain inserted 2020 & removed 4 months ago    History of tonsillectomy    History of hip replacement, total, right    Elective surgery  plastic surgery Left shin                                                ALLERGIES:  ACE inhibitors (Rash)  Entresto (Swelling)  Atorvastatin Calcium (Unknown)  Bactrim (Unknown)  Plavix (Unknown)                           HOME MEDICATIONS  Home Medications:  aspirin 81 mg oral delayed release tablet: 1 tab(s) orally once a day (23 Feb 2023 02:35)  diazePAM 2 mg oral tablet: 0.5 tab(s) orally once a day (at bedtime) (23 Feb 2023 02:35)  DULoxetine 30 mg oral delayed release capsule: 1 cap(s) orally 3 times a day (23 Feb 2023 02:35)  insulin glargine 100 units/mL subcutaneous solution: 25 unit(s) subcutaneous once a day (at bedtime) (23 Feb 2023 02:35)  insulin glargine 100 units/mL subcutaneous solution: 40 microcurie subcutaneous once a day (23 Feb 2023 02:35)  insulin lispro 100 units/mL injectable solution: if your finger stick is 150-199 please inject 2 units  if your finger stick is 200-250 please inject 4 units  if your finger stick is 251-300 please inject 6 units  if your finger stick is 301-350 please inject 8 units  if your finger stick is more than 350 please call your physician    (23 Feb 2023 02:35)  levothyroxine 25 mcg (0.025 mg) oral tablet: 1 tab(s) orally 6 times a week (23 Feb 2023 02:35)  levothyroxine 50 mcg (0.05 mg) oral tablet: 1 tab(s) orally once a week (23 Feb 2023 02:35)  metoprolol succinate 25 mg oral tablet, extended release: 1 tab(s) orally once a day (23 Feb 2023 02:35)  Multiple Vitamins oral tablet: 1 tab(s) orally once a day (23 Feb 2023 02:35)  pantoprazole 40 mg oral delayed release tablet: 1 tab(s) orally once a day (before a meal) (23 Feb 2023 02:35)  prasugrel 10 mg oral tablet: 1 tab(s) orally once a day (23 Feb 2023 02:35)  rosuvastatin 40 mg oral tablet: 1 tab(s) orally once a day (23 Feb 2023 02:35)  spironolactone 25 mg oral tablet: 1 tab(s) orally once a day (23 Feb 2023 02:35)                           MEDICATIONS:  STANDING MEDICATIONS  aspirin enteric coated 81 milliGRAM(s) Oral daily  chlorhexidine 2% Cloths 1 Application(s) Topical daily  dextrose 5% + sodium chloride 0.9%. 1000 milliLiter(s) IV Continuous <Continuous>  dextrose 5%. 1000 milliLiter(s) IV Continuous <Continuous>  dextrose 5%. 1000 milliLiter(s) IV Continuous <Continuous>  dextrose 50% Injectable 25 Gram(s) IV Push once  dextrose 50% Injectable 12.5 Gram(s) IV Push once  dextrose 50% Injectable 25 Gram(s) IV Push once  diazepam    Tablet 1 milliGRAM(s) Oral at bedtime  DULoxetine 30 milliGRAM(s) Oral <User Schedule>  folic acid 1 milliGRAM(s) Oral <User Schedule>  glucagon  Injectable 1 milliGRAM(s) IntraMuscular once  heparin   Injectable 5000 Unit(s) SubCutaneous every 12 hours  hydrocortisone 2.5% Cream 1 Application(s) Topical two times a day  insulin glargine Injectable (LANTUS) 5 Unit(s) SubCutaneous at bedtime  insulin lispro (ADMELOG) corrective regimen sliding scale   SubCutaneous three times a day before meals  lactulose Syrup 10 Gram(s) Oral at bedtime  levothyroxine 25 MICROGram(s) Oral <User Schedule>  levothyroxine 50 MICROGram(s) Oral <User Schedule>  magnesium sulfate  IVPB 2 Gram(s) IV Intermittent once  methotrexate (Non - oncologic) 10 milliGRAM(s) Oral every week  metoprolol succinate ER 25 milliGRAM(s) Oral daily  midodrine. 5 milliGRAM(s) Oral <User Schedule>  pantoprazole    Tablet 40 milliGRAM(s) Oral before breakfast  polyethylene glycol 3350 17 Gram(s) Oral two times a day  prasugrel 10 milliGRAM(s) Oral daily  senna 2 Tablet(s) Oral at bedtime  torsemide 20 milliGRAM(s) Oral daily    PRN MEDICATIONS  acetaminophen     Tablet .. 325 milliGRAM(s) Oral every 4 hours PRN  dextrose Oral Gel 15 Gram(s) Oral once PRN                                            ------------------------------------------------------------  VITAL SIGNS: Last 24 Hours  T(C): 36.4 (31 May 2023 05:40), Max: 36.6 (30 May 2023 19:54)  T(F): 97.5 (31 May 2023 05:40), Max: 97.9 (30 May 2023 19:54)  HR: 81 (31 May 2023 05:40) (80 - 82)  BP: 101/73 (31 May 2023 05:40) (86/53 - 104/64)  BP(mean): --  RR: 18 (31 May 2023 05:40) (18 - 18)  SpO2: --      05-30-23 @ 07:01  -  05-31-23 @ 07:00  --------------------------------------------------------  IN: 120 mL / OUT: 650 mL / NET: -530 mL    05-31-23 @ 07:01 - 05-31-23 @ 11:49  --------------------------------------------------------  IN: 320 mL / OUT: 0 mL / NET: 320 mL                                               LABS:                        10.8   6.43  )-----------( 211      ( 31 May 2023 05:31 )             37.7     05-31    144  |  98  |  25<H>  ----------------------------<  57<L>  4.2   |  33<H>  |  1.5    Ca    9.1      31 May 2023 05:31  Mg     1.9     05-30    TPro  6.4  /  Alb  3.4<L>  /  TBili  1.6<H>  /  DBili  x   /  AST  25  /  ALT  18  /  AlkPhos  114  05-31                                                RADIOLOGY:    PHYSICAL EXAM:  GENERAL: Anxious,   HEAD:  Atraumatic, Normocephalic  EYES: EOMI, PERRLA, conjunctiva and sclera clear  CHEST/LUNG: diminished bs bilaterally;   HEART: Regular rate and rhythm; No murmurs, rubs, or gallops  ABDOMEN: Bowel Sounds present; Soft, nontender, nondistended  EXTREMITIES:  2+ Peripheral Pulses, brisk capillary refill. No clubbing, cyanosis, or edema  NERVOUS SYSTEM:  A&Ox3, no focal deficits   SKIN: No rashes or lesions                                       ASSESSMENT & PLAN  68 yo M PMHx chronic HFrEF 15-20% s/p ICD, decreased RV function, mild MR/TR, DM type I w/ neuropathy and hx hypoglycemia, DLD, HTN, CAD, hx MI, s/p CABG, s/p stent (2018), hx in-stent thrombosis while on Plavix, PAD s/p 3 LLE stents, TIAGO (needs CPAP auto-regulating pressure 10-16, patient doesn't use it due to claustrophobia), Psoriasis, R-AKA presenting to ED s/p fall. He states he was lying in bed at home when he fell asleep and fell off the bed. Found to have moises and mild elevation of trop     A/P:   Acute on Chronic HFrEF:   Patient with SOB, Pro-BNP 9100  CT chest and abdomen showed moderate tp large bilateral pleural effusions, right greater than left. Small to moderate volume abdominopelvic ascites.  CXR 5/29 is still with bilateral pleural effusion and interstitial edema.   Echo 5/24/23 showed LVEF <20%, mod MR, mod to severe TR.   s/p Bumex 2mg IV BID and Hypertonic saline 3% BID. Start on Torsemide 20mg daily on 5/29 per heart failure team.   Continue Metoprolol. On Midodrine, wean off   History of anaphylactic reaction" to ACE-I (presumably angioedema?) and has allergy to Entresto  Low sodium diet, fluid restriction 1.2L/day,   - 5/31 f/u HF recs    Delirium: likely from hospital stay and medical problem  Seen by bennett mendoza.     CAD s/p PCI  Type 2 NSTEMI due to demand ischemia:   Troponin 0.09>0.10  EKG showed paced rhythm.   Continue Aspirin, Prasugrel, Metoprolol, add Lipitor 40mg po daily.    Mechanical Fall   No CT evidence for acute traumatic injury within the chest, abdomen or pelvis  Fall Precaution, PT follow up.     MOISES on CKD III  likely cardiorenal vs obstruction.   Cr increased to 2.6, now improving to 1.2, baseline 1.1  Kidney US:  showed Mild left hydronephrosis/dilated pelvis, unchanged. Left lower pole 0.5 cm nonobstructive calculus.  s/p Loja placement.     Iron deficiency anemia:   Iron studies reviewed Iron level 13, Iron sat 3%  Continue Venofer 200mg IV daily for 5 days.      Transaminitis  Mild elevation, AST/ALT< resolved, likely congestive hepatopathy.     DM type 1 complicated by hypoglycemia:  episodes of hypoglycemia at home  Continue Lantus and insulin sliding scale.     Hypothyroidism: continue Synthroid    Recent dx of RA   Cont Methotrexate weekly.     DVT prophylaxis: Heparin SC.   full code    Very poor prognosis, advanced heart failure, recurrent admission, palliative follow up.   #Progress Note Handoff:  Pending (specify):  improving volume overload, heart failure follow up.   Family discussion: with his wife, update about echo result,   Disposition: Home vs SNF.

## 2023-05-31 NOTE — PROGRESS NOTE ADULT - SUBJECTIVE AND OBJECTIVE BOX
HPI:  67-year-old male w/ HFrEF 15-20% s/p ICD, decreased RV function, mild MR/TR, DM type I w/ neuropathy and hx hypoglycemia , DL, HTN, CAD, hx MI, s/p CABG, s/p stent (2018), hx in-stent thrombosis while on Plavix, PAD s/p 3 LLE stents, TIAGO (needs CPAP auto-regulating pressure 10-16, patient doesn't use it due to claustrophobia), Psoriasis, R-AKA presenting to ED s/p fall. He states he was lying in bed at home when he fell asleep and fell off the bed, and complains of generalized headache neck pain, non-radiating, no alleviating or aggravating factors, associated with right anterior chest wall pain. Denies LOC,  fevers, chills, nausea, vomiting, dizziness, abdominal pain, shortness of breath. States he had TDAP recently. Pt was not down for long as wife was upstairs when he fell off the bed and called EMS right away.     Interval History  - More alert today, spouse at bedside feeding patient. Reports generalized pain relieved with PRN Oxycodone   ADVANCE DIRECTIVES:    [ x] Full Code [ ] DNR  MOLST  [ ]  Living Will  [ ]   DECISION MAKER(s):  [x ] Health Care Proxy(s)  [ ] Surrogate(s)  [ ] Guardian           Name(s): Phone Number(s):      Answers: Emergency Contact Name Raine,  Answers: Emergency Contact Phone # [phone],  Answers: Emergency Contact Relationship wife    BASELINE (I)ADL(s) (prior to admission):  [place]: [ ]Total  [ ] Moderate [x ]Dependent    Allergies    ACE inhibitors (Rash)  Entresto (Swelling)  Atorvastatin Calcium (Unknown)  Bactrim (Unknown)  Plavix (Unknown)    Intolerances    MEDICATIONS  (STANDING):  aspirin enteric coated 81 milliGRAM(s) Oral daily  chlorhexidine 2% Cloths 1 Application(s) Topical daily  dextrose 5% + sodium chloride 0.9%. 1000 milliLiter(s) (50 mL/Hr) IV Continuous <Continuous>  dextrose 5%. 1000 milliLiter(s) (50 mL/Hr) IV Continuous <Continuous>  dextrose 5%. 1000 milliLiter(s) (100 mL/Hr) IV Continuous <Continuous>  dextrose 50% Injectable 25 Gram(s) IV Push once  dextrose 50% Injectable 12.5 Gram(s) IV Push once  dextrose 50% Injectable 25 Gram(s) IV Push once  diazepam    Tablet 1 milliGRAM(s) Oral at bedtime  DULoxetine 30 milliGRAM(s) Oral <User Schedule>  folic acid 1 milliGRAM(s) Oral <User Schedule>  glucagon  Injectable 1 milliGRAM(s) IntraMuscular once  heparin   Injectable 5000 Unit(s) SubCutaneous every 12 hours  hydrocortisone 2.5% Cream 1 Application(s) Topical two times a day  insulin glargine Injectable (LANTUS) 5 Unit(s) SubCutaneous at bedtime  insulin lispro (ADMELOG) corrective regimen sliding scale   SubCutaneous three times a day before meals  lactulose Syrup 10 Gram(s) Oral at bedtime  levothyroxine 25 MICROGram(s) Oral <User Schedule>  levothyroxine 50 MICROGram(s) Oral <User Schedule>  magnesium sulfate  IVPB 2 Gram(s) IV Intermittent once  methotrexate (Non - oncologic) 10 milliGRAM(s) Oral every week  metoprolol succinate ER 25 milliGRAM(s) Oral daily  midodrine. 5 milliGRAM(s) Oral <User Schedule>  pantoprazole    Tablet 40 milliGRAM(s) Oral before breakfast  polyethylene glycol 3350 17 Gram(s) Oral two times a day  prasugrel 10 milliGRAM(s) Oral daily  senna 2 Tablet(s) Oral at bedtime  torsemide 20 milliGRAM(s) Oral daily    MEDICATIONS  (PRN):  acetaminophen     Tablet .. 325 milliGRAM(s) Oral every 4 hours PRN Moderate Pain (4 - 6)  dextrose Oral Gel 15 Gram(s) Oral once PRN Blood Glucose LESS THAN 70 milliGRAM(s)/deciliter    PRESENT SYMPTOMS: [ ]Unable to obtain due to poor mentation   Source if other than patient:  [ ]Family   [ ]Team     Pain: [ ]yes [ ]no  QOL impact -   Location -                    Aggravating factors -  Quality -  Radiation -  Timing-  Severity (0-10 scale):  Minimal acceptable level (0-10 scale):     CPOT:    https://www.[facility].org/getattachment/pwg37r22-9x4u-8c2w-2c3c-7038t7593d6q/Critical-Care-Pain-Observation-Tool-(CPOT)    PAIN AD Score:   [email or web address] (press ctrl +  left click to view)    Dyspnea:                           [ ]None[ ]Mild [ ]Moderate [ ]Severe     Respiratory Distress Observation Scale (RDOS):   A score of 0 to 2 signifies little or no respiratory distress, 3 signifies mild distress, scores 4 to 6 indicate moderate distress, and scores greater than 7 signify severe distress  [email or web address]    Anxiety:                             [ ]None[ ]Mild [ ]Moderate [ ]Severe   Fatigue:                             [ ]None[ ]Mild [ ]Moderate [ ]Severe   Nausea:                             [ ]None[ ]Mild [ ]Moderate [ ]Severe   Loss of appetite:              [ ]None[ ]Mild [ ]Moderate [ ]Severe   Constipation:                    [ ]None[ ]Mild [ ]Moderate [ ]Severe    Other Symptoms:  [ ]All other review of systems negative     Palliative Performance Status Version 2:         %    [email or web address]  PHYSICAL EXAM:  Vital Signs Last 24 Hrs  T(C): 36.4 (31 May 2023 05:40), Max: 36.6 (30 May 2023 19:54)  T(F): 97.5 (31 May 2023 05:40), Max: 97.9 (30 May 2023 19:54)  HR: 81 (31 May 2023 05:40) (80 - 82)  BP: 101/73 (31 May 2023 05:40) (86/53 - 104/64)  BP(mean): --  RR: 18 (31 May 2023 05:40) (18 - 18)  SpO2: --     I&O's Summary    30 May 2023 07:01  -  31 May 2023 07:00  --------------------------------------------------------  IN: 120 mL / OUT: 650 mL / NET: -530 mL    31 May 2023 07:01  -  31 May 2023 12:49  --------------------------------------------------------  IN: 320 mL / OUT: 0 mL / NET: 320 mL      GENERAL:  [ ] No acute distress [ ]Lethargic  [ ]Unarousable  [ ]Verbal  [ ]Non-Verbal [ ]Cachexia    BEHAVIORAL/PSYCH:  [x ]Alert and Oriented x  2[ ] Anxiety [ ] Delirium [ ] Agitation [ ] Calm   EYES: [ ] No scleral icterus [ ] Scleral icterus [ ] Closed  ENMT:  [ ]Dry mouth  [ ]No external oral lesions [ ] No external ear or nose lesions  CARDIOVASCULAR:  [ ]Regular [ ]Irregular [ ]Tachy [ ]Not Tachy  [ ]Clitfon [ ] Edema [ ] No edema  PULMONARY:  [ ]Tachypnea  [ ]Audible excessive secretions [ x] No labored breathing [ ] labored breathing  GASTROINTESTINAL: [ x]Soft  [ ]Distended  [ ]Not distended [ ]Non tender [ ]Tender  MUSCULOSKELETAL: [ ]No clubbing [ ] clubbing  [ ] No cyanosis [ ] cyanosis  NEUROLOGIC: [ ]No focal deficits  [x ]Follows commands  [ ]Does not follow commands  [ ]Cognitive impairment  [ ]Dysphagia  [ ]Dysarthria  [ ]Paresis   SKIN: [ ] Jaundiced [x ] Non-jaundiced [ ]Rash [ ]No Rash [ ] Warm [ x] Multiple small sores   MISC/LINES: [ ] ET tube [ ] Trach [ ]NGT/OGT [ ]PEG [ x]Loja    CRITICAL CARE:  [ ] Shock Present  [ ]Septic [ ]Cardiogenic [ ]Neurologic [ ]Hypovolemic  [ ]  Vasopressors [ ]  Inotropes   [ ]Respiratory failure present [ ]Mechanical ventilation [ ]Non-invasive ventilatory support [ ]High flow  [ ]Acute  [ ]Chronic [ ]Hypoxic  [ ]Hypercarbic [ ]Other  [ ]Other organ failure    LABS: reviewed by me                        10.8   6.43  )-----------( 211      ( 31 May 2023 05:31 )             37.7   05-31    144  |  98  |  25<H>  ----------------------------<  57<L>  4.2   |  33<H>  |  1.5    Ca    9.1      31 May 2023 05:31  Mg     1.9     05-30    TPro  6.4  /  Alb  3.4<L>  /  TBili  1.6<H>  /  DBili  x   /  AST  25  /  ALT  18  /  AlkPhos  114  05-31        RADIOLOGY & ADDITIONAL STUDIES: reviewed by me    EKG: reviewed by me        REFERRALS:   [ ]Chaplaincy  [ ]Hospice  [ ]Child Life  [ x]Social Work  [ x]Case management [ ]Holistic Therapy     Patient discussed with primary medical team MD  Palliative care education provided to patient and/or family    Goals of Care Document:

## 2023-06-01 NOTE — PROGRESS NOTE ADULT - ASSESSMENT
68 yo M PMHx chronic HFrEF 15-20% s/p ICD, decreased RV function, mild MR/TR, DM type I w/ neuropathy and hx hypoglycemia, DLD, HTN, CAD, hx MI, s/p CABG, s/p stent (2018), hx in-stent thrombosis while on Plavix, PAD s/p 3 LLE stents, TIAGO (needs CPAP auto-regulating pressure 10-16, patient doesn't use it due to claustrophobia), Psoriasis, R-AKA presenting to ED s/p fall. He states he was lying in bed at home when he fell asleep and fell off the bed. Found to have moises and mild elevation of trop     A/P:   #Acute on Chronic HFrEF:   Patient with SOB, Pro-BNP 9100  CT chest and abdomen showed moderate tp large bilateral pleural effusions, right greater than left. Small to moderate volume abdominopelvic ascites.  CXR 5/29 is still with bilateral pleural effusion and interstitial edema.   Echo 5/24/23 showed LVEF <20%, mod MR, mod to severe TR.   s/p Bumex 2mg IV BID and Hypertonic saline 3% BID. Started on Torsemide 20mg daily on 5/29 per heart failure team.   Continue Metoprolol. On Midodrine, wean off   History of anaphylactic reaction" to ACE-I (presumably angioedema?) and has allergy to Entresto  Low sodium diet, fluid restriction 1.2L/day,   6/1: Repeat CXR better but still congested. D-dimer 437. Patient still short winded when talking. Orthopnea+.   Wishes to go home though.  Dr Fernandez: Put back on Bumex 2mg IV BID. Will reassess patient 6/2.   HF team can decide on whether to also restart 3% Saline BID?     Delirium: likely from hospital stay and medical problem  Seen by psych, stable.     CAD s/p PCI  Type 2 NSTEMI due to demand ischemia:   Troponin 0.09>0.10  EKG showed paced rhythm.   Continue Aspirin, Prasugrel, Metoprolol, add Lipitor 40mg po daily.    Mechanical Fall   No CT evidence for acute traumatic injury within the chest, abdomen or pelvis  Fall Precaution, PT follow up.     MOISES on CKD III  likely cardiorenal vs obstruction.   Cr increased to 2.6, now improving to 1.2, baseline 1.1  Kidney US:  showed Mild left hydronephrosis/dilated pelvis, unchanged. Left lower pole 0.5 cm nonobstructive calculus.  s/p Loja placement.     Iron deficiency anemia:   Iron studies reviewed Iron level 13, Iron sat 3%  Continue Venofer 200mg IV daily for 5 days.      Transaminitis  Mild elevation, AST/ALT< resolved, likely congestive hepatopathy.     DM type 1 complicated by hypoglycemia:  episodes of hypoglycemia at home  Continue Lantus and insulin sliding scale.     Hypothyroidism: continue Synthroid    Recent dx of RA   Cont Methotrexate weekly.     DVT prophylaxis: Heparin SC.   full code    Very poor prognosis, advanced heart failure, recurrent admission, palliative follow up.     #Progress Note Handoff:  Back on IV Bumex. Pending Heart failure team f/u 6/2.   Also need to attempt bed to wheel chair transfer eventually to evaluate whether he is back to his baseline.

## 2023-06-01 NOTE — DISCHARGE NOTE NURSING/CASE MANAGEMENT/SOCIAL WORK - NSDCPEFALRISK_GEN_ALL_CORE
For information on Fall & Injury Prevention, visit: [email or web address] OR  [email or web address] OR  https://www.cdc.gov/steadi/patient.html

## 2023-06-01 NOTE — DISCHARGE NOTE NURSING/CASE MANAGEMENT/SOCIAL WORK - PATIENT PORTAL LINK FT
You can access the FollowMyHealth Patient Portal offered by [facility] by registering at the following website: [email or web address]. By joining [facility]’s FollowMyHealth portal, you will also be able to view your health information using other applications (apps) compatible with our system.

## 2023-06-01 NOTE — PROGRESS NOTE ADULT - SUBJECTIVE AND OBJECTIVE BOX
SUBJ: Patient seen and examined. No events overnight.       MEDICATIONS  (STANDING):  aspirin enteric coated 81 milliGRAM(s) Oral daily  buMETAnide Injectable 2 milliGRAM(s) IV Push two times a day  chlorhexidine 2% Cloths 1 Application(s) Topical daily  dextrose 5% + sodium chloride 0.9%. 1000 milliLiter(s) (50 mL/Hr) IV Continuous <Continuous>  dextrose 5%. 1000 milliLiter(s) (50 mL/Hr) IV Continuous <Continuous>  dextrose 5%. 1000 milliLiter(s) (100 mL/Hr) IV Continuous <Continuous>  dextrose 50% Injectable 25 Gram(s) IV Push once  dextrose 50% Injectable 12.5 Gram(s) IV Push once  dextrose 50% Injectable 25 Gram(s) IV Push once  diazepam    Tablet 1 milliGRAM(s) Oral at bedtime  DULoxetine 30 milliGRAM(s) Oral <User Schedule>  folic acid 1 milliGRAM(s) Oral <User Schedule>  glucagon  Injectable 1 milliGRAM(s) IntraMuscular once  heparin   Injectable 5000 Unit(s) SubCutaneous every 12 hours  hydrocortisone 2.5% Cream 1 Application(s) Topical two times a day  insulin glargine Injectable (LANTUS) 5 Unit(s) SubCutaneous at bedtime  insulin lispro (ADMELOG) corrective regimen sliding scale   SubCutaneous three times a day before meals  lactulose Syrup 10 Gram(s) Oral at bedtime  levothyroxine 25 MICROGram(s) Oral <User Schedule>  levothyroxine 50 MICROGram(s) Oral <User Schedule>  magnesium sulfate  IVPB 2 Gram(s) IV Intermittent once  methotrexate (Non - oncologic) 10 milliGRAM(s) Oral every week  metoprolol succinate ER 25 milliGRAM(s) Oral daily  midodrine. 5 milliGRAM(s) Oral <User Schedule>  pantoprazole    Tablet 40 milliGRAM(s) Oral before breakfast  polyethylene glycol 3350 17 Gram(s) Oral two times a day  prasugrel 10 milliGRAM(s) Oral daily  QUEtiapine 25 milliGRAM(s) Oral daily  senna 2 Tablet(s) Oral at bedtime    MEDICATIONS  (PRN):  acetaminophen     Tablet .. 325 milliGRAM(s) Oral every 4 hours PRN Moderate Pain (4 - 6)  dextrose Oral Gel 15 Gram(s) Oral once PRN Blood Glucose LESS THAN 70 milliGRAM(s)/deciliter  oxyCODONE    IR 5 milliGRAM(s) Oral every 6 hours PRN Pain 4-10            Vital Signs Last 24 Hrs  T(C): 36.4 (02 Jun 2023 07:54), Max: 36.4 (02 Jun 2023 07:54)  T(F): 97.5 (02 Jun 2023 07:54), Max: 97.5 (02 Jun 2023 07:54)  HR: 87 (02 Jun 2023 07:54) (80 - 91)  BP: 104/62 (02 Jun 2023 07:54) (91/51 - 109/67)  BP(mean): --  RR: 18 (02 Jun 2023 07:54) (18 - 18)  SpO2: 97% (02 Jun 2023 07:54) (95% - 97%)    Parameters below as of 02 Jun 2023 07:54  Patient On (Oxygen Delivery Method): nasal cannula  O2 Flow (L/min): 2       REVIEW OF SYSTEMS:  CONSTITUTIONAL: No fever  NECK: No pain or stiffness  CARDIOVASCULAR: patient denies chest pain, shortness of breath or palpitations .  Respiratory: No cough or wheezing.  NEUROLOGICAL: No focal deficits to report.  GI: no BRBPR, no N,V,diarrhea.    PSYCHIATRY: normal mood and affect  HEENT: no nasal discharge, no ecchymosis        PHYSICAL EXAM:  GENERAL: NAD  HEAD:  Atraumatic, Normocephalic  NECK: Supple, No JVD  NERVOUS SYSTEM:  Alert & Oriented X3, Good concentration  CHEST/LUNG: Clear to anterior auscultation bilaterally  HEART: Regular rate and rhythm, HSM  ABDOMEN: Soft, Nontender, Nondistended;   EXTREMITIES:  No  edema      	  TELEMETRY:      LABS:                        10.2   8.47  )-----------( 179      ( 01 Jun 2023 04:38 )             35.2     06-01    140  |  98  |  27<H>  ----------------------------<  150<H>  4.1   |  30  |  1.5    Ca    8.7      01 Jun 2023 04:38  Mg     1.9     06-01    TPro  5.8<L>  /  Alb  3.0<L>  /  TBili  1.6<H>  /  DBili  x   /  AST  18  /  ALT  16  /  AlkPhos  113  06-01            I&O's Summary    01 Jun 2023 07:01  -  02 Jun 2023 07:00  --------------------------------------------------------  IN: 1380 mL / OUT: 1160 mL / NET: 220 mL      BNP  RADIOLOGY & ADDITIONAL STUDIES:    IMPRESSION AND PLAN:  HFrEF  Mod MR  MOISES/ resolved    - Management as pe primary team  - Continue diuresis  - F/U on renal function and I/O  - Will follow as needed

## 2023-06-01 NOTE — PROGRESS NOTE ADULT - SUBJECTIVE AND OBJECTIVE BOX
S: still short winded when talking.  Wishes to go home though.  Orthopnea+      All other pertinent ROS negative.      05-31-23 @ 07:01  -  06-01-23 @ 07:00  --------------------------------------------------------  IN: 320 mL / OUT: 1030 mL / NET: -710 mL    06-01-23 @ 07:01  -  06-01-23 @ 19:38  --------------------------------------------------------  IN: 1030 mL / OUT: 560 mL / NET: 470 mL      Vital Signs Last 24 Hrs  T(C): 36.3 (01 Jun 2023 12:26), Max: 36.3 (31 May 2023 20:15)  T(F): 97.4 (01 Jun 2023 12:26), Max: 97.4 (01 Jun 2023 12:26)  HR: 80 (01 Jun 2023 18:27) (80 - 91)  BP: 100/58 (01 Jun 2023 18:27) (94/55 - 123/79)  BP(mean): --  RR: 18 (01 Jun 2023 12:26) (18 - 18)  SpO2: 96% (01 Jun 2023 18:27) (96% - 98%)    Parameters below as of 01 Jun 2023 18:27  Patient On (Oxygen Delivery Method): nasal cannula  O2 Flow (L/min): 2    PHYSICAL EXAM:    Constitutional: NAD, awake and alert  HEENT: PERR, EOMI, Normal Hearing, MMM  Neck: Soft and supple, No LAD, No JVD  Respiratory: Breath sounds are clear bilaterally, No wheezing, rales or rhonchi. decreased BS at bases.   Cardiovascular: S1 and S2, regular rate and rhythm, no Murmurs, gallops or rubs  Gastrointestinal: Bowel Sounds present, soft, nontender, nondistended, no guarding, no rebound  Extremities: No peripheral edema. Right BKA.       MEDICATIONS:  MEDICATIONS  (STANDING):  aspirin enteric coated 81 milliGRAM(s) Oral daily  buMETAnide Injectable 2 milliGRAM(s) IV Push two times a day  chlorhexidine 2% Cloths 1 Application(s) Topical daily  dextrose 5% + sodium chloride 0.9%. 1000 milliLiter(s) (50 mL/Hr) IV Continuous <Continuous>  dextrose 5%. 1000 milliLiter(s) (50 mL/Hr) IV Continuous <Continuous>  dextrose 5%. 1000 milliLiter(s) (100 mL/Hr) IV Continuous <Continuous>  dextrose 50% Injectable 25 Gram(s) IV Push once  dextrose 50% Injectable 12.5 Gram(s) IV Push once  dextrose 50% Injectable 25 Gram(s) IV Push once  diazepam    Tablet 1 milliGRAM(s) Oral at bedtime  DULoxetine 30 milliGRAM(s) Oral <User Schedule>  folic acid 1 milliGRAM(s) Oral <User Schedule>  glucagon  Injectable 1 milliGRAM(s) IntraMuscular once  heparin   Injectable 5000 Unit(s) SubCutaneous every 12 hours  hydrocortisone 2.5% Cream 1 Application(s) Topical two times a day  insulin glargine Injectable (LANTUS) 5 Unit(s) SubCutaneous at bedtime  insulin lispro (ADMELOG) corrective regimen sliding scale   SubCutaneous three times a day before meals  lactulose Syrup 10 Gram(s) Oral at bedtime  levothyroxine 25 MICROGram(s) Oral <User Schedule>  levothyroxine 50 MICROGram(s) Oral <User Schedule>  magnesium sulfate  IVPB 2 Gram(s) IV Intermittent once  methotrexate (Non - oncologic) 10 milliGRAM(s) Oral every week  metoprolol succinate ER 25 milliGRAM(s) Oral daily  midodrine. 5 milliGRAM(s) Oral <User Schedule>  pantoprazole    Tablet 40 milliGRAM(s) Oral before breakfast  polyethylene glycol 3350 17 Gram(s) Oral two times a day  prasugrel 10 milliGRAM(s) Oral daily  QUEtiapine 25 milliGRAM(s) Oral daily  senna 2 Tablet(s) Oral at bedtime      LABS: All Labs Reviewed:                        10.2   8.47  )-----------( 179      ( 01 Jun 2023 04:38 )             35.2     06-01    140  |  98  |  27<H>  ----------------------------<  150<H>  4.1   |  30  |  1.5    Ca    8.7      01 Jun 2023 04:38  Mg     1.9     06-01    TPro  5.8<L>  /  Alb  3.0<L>  /  TBili  1.6<H>  /  DBili  x   /  AST  18  /  ALT  16  /  AlkPhos  113  06-01          Blood Culture:     Radiology: reviewed

## 2023-06-02 NOTE — PROGRESS NOTE ADULT - SUBJECTIVE AND OBJECTIVE BOX
Patient is a 67y old  Male who presents with a chief complaint of Mechanical fall (02 Jun 2023 15:14)      Patient seen and examined at bedside.  Patient denies any chest pain or shortness of breath   ALLERGIES:  ACE inhibitors (Rash)  Entresto (Swelling)  Atorvastatin Calcium (Unknown)  Bactrim (Unknown)  Plavix (Unknown)    MEDICATIONS:  acetaminophen     Tablet .. 325 milliGRAM(s) Oral every 4 hours PRN  aspirin enteric coated 81 milliGRAM(s) Oral daily  chlorhexidine 2% Cloths 1 Application(s) Topical daily  dextrose 5% + sodium chloride 0.9%. 1000 milliLiter(s) IV Continuous <Continuous>  dextrose 5%. 1000 milliLiter(s) IV Continuous <Continuous>  dextrose 5%. 1000 milliLiter(s) IV Continuous <Continuous>  dextrose 50% Injectable 25 Gram(s) IV Push once  dextrose 50% Injectable 12.5 Gram(s) IV Push once  dextrose 50% Injectable 25 Gram(s) IV Push once  dextrose Oral Gel 15 Gram(s) Oral once PRN  diazepam    Tablet 1 milliGRAM(s) Oral at bedtime  DULoxetine 30 milliGRAM(s) Oral <User Schedule>  folic acid 1 milliGRAM(s) Oral <User Schedule>  glucagon  Injectable 1 milliGRAM(s) IntraMuscular once  heparin   Injectable 5000 Unit(s) SubCutaneous every 12 hours  hydrocortisone 2.5% Cream 1 Application(s) Topical two times a day  insulin glargine Injectable (LANTUS) 5 Unit(s) SubCutaneous at bedtime  insulin lispro (ADMELOG) corrective regimen sliding scale   SubCutaneous three times a day before meals  lactulose Syrup 10 Gram(s) Oral at bedtime  levothyroxine 25 MICROGram(s) Oral <User Schedule>  levothyroxine 50 MICROGram(s) Oral <User Schedule>  magnesium sulfate  IVPB 2 Gram(s) IV Intermittent once  magnesium sulfate  IVPB 2 Gram(s) IV Intermittent once  methotrexate (Non - oncologic) 10 milliGRAM(s) Oral every week  metoprolol succinate ER 25 milliGRAM(s) Oral daily  midodrine. 10 milliGRAM(s) Oral three times a day  oxyCODONE    IR 5 milliGRAM(s) Oral every 6 hours PRN  pantoprazole    Tablet 40 milliGRAM(s) Oral before breakfast  polyethylene glycol 3350 17 Gram(s) Oral two times a day  prasugrel 10 milliGRAM(s) Oral daily  QUEtiapine 25 milliGRAM(s) Oral daily  senna 2 Tablet(s) Oral at bedtime  torsemide 20 milliGRAM(s) Oral two times a day    Vital Signs Last 24 Hrs  T(F): 97.8 (02 Jun 2023 15:34), Max: 97.8 (02 Jun 2023 15:34)  HR: 78 (02 Jun 2023 15:34) (78 - 91)  BP: 100/60 (02 Jun 2023 15:34) (91/51 - 109/67)  RR: 18 (02 Jun 2023 15:34) (18 - 18)  SpO2: 99% (02 Jun 2023 15:34) (95% - 99%)  I&O's Summary    01 Jun 2023 07:01  -  02 Jun 2023 07:00  --------------------------------------------------------  IN: 1380 mL / OUT: 1160 mL / NET: 220 mL    02 Jun 2023 07:01  -  02 Jun 2023 16:25  --------------------------------------------------------  IN: 720 mL / OUT: 0 mL / NET: 720 mL        PHYSICAL EXAM:  General: NAD, A/O x 3  ENT: MMM  Neck: Supple, No JVD  Lungs: diminished breath sounds bilaterally, basal crackle   Cardio: RRR, S1/S2, 2/6 systolic murmur   Abdomen: Soft, Nontender, Nondistended; Bowel sounds present  Extremities: No cyanosis, multiple skin lesions -     LABS:                        10.2   7.39  )-----------( 202      ( 02 Jun 2023 08:48 )             35.7     06-02    140  |  97  |  28  ----------------------------<  112  4.5   |  31  |  1.5    Ca    8.7      02 Jun 2023 08:48  Mg     2.0     06-02    TPro  6.2  /  Alb  3.2  /  TBili  1.7  /  DBili  x   /  AST  27  /  ALT  16  /  AlkPhos  109  06-02                            POCT Blood Glucose.: 141 mg/dL (02 Jun 2023 11:02)  POCT Blood Glucose.: 111 mg/dL (02 Jun 2023 07:41)  POCT Blood Glucose.: 227 mg/dL (01 Jun 2023 21:04)              RADIOLOGY & ADDITIONAL TESTS:    Care Discussed with Consultants/Other Providers:

## 2023-06-02 NOTE — PROGRESS NOTE ADULT - PROBLEM SELECTOR PLAN 3
Spouse spoke to NP she is struggling with whether or not to send him to SNF for rehab, but she feels she cannot care for him at home. Referred her to case management for further d/c planning. She is aware of his overall prognosis and she does not want to put any advance directives in place as yet. Encouraged her to reach out to palliative care team as needed

## 2023-06-02 NOTE — PROGRESS NOTE ADULT - SUBJECTIVE AND OBJECTIVE BOX
Patient is a 67y old  Male who presents with a chief complaint of Mechanical fall (02 Jun 2023 12:12)      HPI:  67-year-old male w/ HFrEF 15-20% s/p ICD, decreased RV function, mild MR/TR, DM type I w/ neuropathy and hx hypoglycemia , DL, HTN, CAD, hx MI, s/p CABG, s/p stent (2018), hx in-stent thrombosis while on Plavix, PAD s/p 3 LLE stents, TIAGO (needs CPAP auto-regulating pressure 10-16, patient doesn't use it due to claustrophobia), Psoriasis, R-AKA presenting to ED s/p fall. He states he was lying in bed at home when he fell asleep and fell off the bed, and complains of generalized headache neck pain, non-radiating, no alleviating or aggravating factors, associated with right anterior chest wall pain. Denies LOC,  fevers, chills, nausea, vomiting, dizziness, abdominal pain, shortness of breath. States he had TDAP recently. Pt was not down for long as wife was upstairs when he fell off the bed and called EMS right away.     ED  Vital Signs  T(F):Max: 97.9   HR: 76 - 110  BP: 100/61 - 102/62  RR: 18   SpO2: 94% - 96% on room air     Labs significant for cr 1.8 (baseline ~ 1.2)  trop 0.10             (18 May 2023 14:16)      PAST MEDICAL & SURGICAL HISTORY:  Status post percutaneous transluminal coronary angioplasty  2 stents      Atherosclerosis of coronary artery  CAD (coronary artery disease)      Osteoarthritis      HLD (hyperlipidemia)      Diabetes  on insulin pump      CHF (congestive heart failure)  EF ~ 25%      History of celiac disease      Diverticulitis      STEMI (ST elevation myocardial infarction)      Diabetic neuropathy  Hands and Feet      Anxiety and depression      Other postprocedural status  Fixation hardware in foot LEFT      Stented coronary artery  10/18 heart attack  INFERIOR WALL MI      S/P CABG x 1  2018      S/P TKR (total knee replacement), right  with infection Mrsa   per pt he was cleared from MRSA infection      Surgery, elective  right knee wound debridement      History of open reduction and internal fixation (ORIF) procedure  right hip      H/O shoulder surgery  right      AICD (automatic cardioverter/defibrillator) present  [facility]      Cholecystostomy care  drain inserted 2020 & removed 4 months ago      History of tonsillectomy      History of hip replacement, total, right      Elective surgery  plastic surgery Left shin          Home Medications:  aspirin 81 mg oral delayed release tablet: 1 tab(s) orally once a day (23 Feb 2023 02:35)  diazePAM 2 mg oral tablet: 0.5 tab(s) orally once a day (at bedtime) (23 Feb 2023 02:35)  DULoxetine 30 mg oral delayed release capsule: 1 cap(s) orally 3 times a day (23 Feb 2023 02:35)  insulin glargine 100 units/mL subcutaneous solution: 25 unit(s) subcutaneous once a day (at bedtime) (23 Feb 2023 02:35)  insulin glargine 100 units/mL subcutaneous solution: 40 microcurie subcutaneous once a day (23 Feb 2023 02:35)  insulin lispro 100 units/mL injectable solution: if your finger stick is 150-199 please inject 2 units  if your finger stick is 200-250 please inject 4 units  if your finger stick is 251-300 please inject 6 units  if your finger stick is 301-350 please inject 8 units  if your finger stick is more than 350 please call your physician    (23 Feb 2023 02:35)  levothyroxine 25 mcg (0.025 mg) oral tablet: 1 tab(s) orally 6 times a week (23 Feb 2023 02:35)  levothyroxine 50 mcg (0.05 mg) oral tablet: 1 tab(s) orally once a week (23 Feb 2023 02:35)  metoprolol succinate 25 mg oral tablet, extended release: 1 tab(s) orally once a day (23 Feb 2023 02:35)  midodrine 5 mg oral tablet: 1 tab(s) orally 2 times a day (01 Jun 2023 14:59)  Multiple Vitamins oral tablet: 1 tab(s) orally once a day (23 Feb 2023 02:35)  pantoprazole 40 mg oral delayed release tablet: 1 tab(s) orally once a day (before a meal) (23 Feb 2023 02:35)  prasugrel 10 mg oral tablet: 1 tab(s) orally once a day (23 Feb 2023 02:35)  QUEtiapine 25 mg oral tablet: 1 tab(s) orally once a day (01 Jun 2023 14:57)  rosuvastatin 40 mg oral tablet: 1 tab(s) orally once a day (23 Feb 2023 02:35)  spironolactone 25 mg oral tablet: 1 tab(s) orally once a day (23 Feb 2023 02:35)      .  Tobacco use:   EtOH use:  Illicit drug use:    Living situation:    Allergies:  ACE inhibitors (Rash)  Entresto (Swelling)  Atorvastatin Calcium (Unknown)  Bactrim (Unknown)  Plavix (Unknown)      FAMILY HISTORY:  Family history of heart disease (Mother)    Family history of allergies  daughter        Hospital Medications:  acetaminophen     Tablet .. 325 milliGRAM(s) Oral every 4 hours PRN  aspirin enteric coated 81 milliGRAM(s) Oral daily  chlorhexidine 2% Cloths 1 Application(s) Topical daily  dextrose 5% + sodium chloride 0.9%. 1000 milliLiter(s) IV Continuous <Continuous>  dextrose 5%. 1000 milliLiter(s) IV Continuous <Continuous>  dextrose 5%. 1000 milliLiter(s) IV Continuous <Continuous>  dextrose 50% Injectable 25 Gram(s) IV Push once  dextrose 50% Injectable 25 Gram(s) IV Push once  dextrose 50% Injectable 12.5 Gram(s) IV Push once  dextrose Oral Gel 15 Gram(s) Oral once PRN  diazepam    Tablet 1 milliGRAM(s) Oral at bedtime  DULoxetine 30 milliGRAM(s) Oral <User Schedule>  folic acid 1 milliGRAM(s) Oral <User Schedule>  glucagon  Injectable 1 milliGRAM(s) IntraMuscular once  heparin   Injectable 5000 Unit(s) SubCutaneous every 12 hours  hydrocortisone 2.5% Cream 1 Application(s) Topical two times a day  insulin glargine Injectable (LANTUS) 5 Unit(s) SubCutaneous at bedtime  insulin lispro (ADMELOG) corrective regimen sliding scale   SubCutaneous three times a day before meals  lactulose Syrup 10 Gram(s) Oral at bedtime  levothyroxine 25 MICROGram(s) Oral <User Schedule>  levothyroxine 50 MICROGram(s) Oral <User Schedule>  magnesium sulfate  IVPB 2 Gram(s) IV Intermittent once  magnesium sulfate  IVPB 2 Gram(s) IV Intermittent once  methotrexate (Non - oncologic) 10 milliGRAM(s) Oral every week  metoprolol succinate ER 25 milliGRAM(s) Oral daily  midodrine. 10 milliGRAM(s) Oral three times a day  oxyCODONE    IR 5 milliGRAM(s) Oral every 6 hours PRN  pantoprazole    Tablet 40 milliGRAM(s) Oral before breakfast  polyethylene glycol 3350 17 Gram(s) Oral two times a day  prasugrel 10 milliGRAM(s) Oral daily  QUEtiapine 25 milliGRAM(s) Oral daily  senna 2 Tablet(s) Oral at bedtime  torsemide 20 milliGRAM(s) Oral two times a day      Vital Signs Last 24 Hrs  T(C): 36.4 (02 Jun 2023 07:54), Max: 36.4 (02 Jun 2023 07:54)  T(F): 97.5 (02 Jun 2023 07:54), Max: 97.5 (02 Jun 2023 07:54)  HR: 80 (02 Jun 2023 14:34) (80 - 91)  BP: 108/54 (02 Jun 2023 14:34) (91/51 - 109/67)  BP(mean): --  RR: 18 (02 Jun 2023 14:34) (18 - 18)  SpO2: 95% (02 Jun 2023 14:34) (95% - 97%)    Parameters below as of 02 Jun 2023 14:34  Patient On (Oxygen Delivery Method): room air        I&O's Summary    01 Jun 2023 07:01  -  02 Jun 2023 07:00  --------------------------------------------------------  IN: 1380 mL / OUT: 1160 mL / NET: 220 mL    02 Jun 2023 07:01  -  02 Jun 2023 15:14  --------------------------------------------------------  IN: 720 mL / OUT: 0 mL / NET: 720 mL        LABS:                        10.2   7.39  )-----------( 202      ( 02 Jun 2023 08:48 )             35.7       06-02    140  |  97<L>  |  28<H>  ----------------------------<  112<H>  4.5   |  31  |  1.5    Ca    8.7      02 Jun 2023 08:48  Mg     2.0     06-02    TPro  6.2  /  Alb  3.2<L>  /  TBili  1.7<H>  /  DBili  x   /  AST  27  /  ALT  16  /  AlkPhos  109  06-02                PHYSICAL EXAM:  GENERAL: NAD  HEAD:  Atraumatic, Normocephalic  EYES: EOMI, PERRLA, conjunctiva and sclera clear  ENT: Moist mucous membranes  NECK: Supple, No JVD  CHEST/LUNG: decreased breathing sounds BL.   HEART: RRR  ABDOMEN: Bowel sounds present; Soft, Nontender, Nondistended. No hepatomegally  EXTREMITIES:  2+ Peripheral Pulses, brisk capillary refill. No clubbing, cyanosis, or edema  NERVOUS SYSTEM:  Alert & Oriented X3, speech clear. No deficits   MSK: FROM all 4 extremities, full and equal strength  SKIN: wounds on his body throughout     IMAGES:

## 2023-06-02 NOTE — PROGRESS NOTE ADULT - PROBLEM SELECTOR PLAN 1
Pt has an AICD, is being followed by the HF team.  Ongoing fluid overload improving w diuresis.  Downgraded off tele

## 2023-06-02 NOTE — PROGRESS NOTE ADULT - SUBJECTIVE AND OBJECTIVE BOX
Date of Admission: 23    Interval History: Patient seen and examined in bed, awake and alert, in NAD. On 1:1 for safety. Bumex IVP ordered but not given due to IV access issues. Awaiting BMP for today .    CHIEF COMPLAINT: Patient is a 67y old  Male who presents with a chief complaint of Mechanical fall (22 May 2023 12:56)    HISTORY OF PRESENT ILLNESS: 67-year-old male w/ HFrEF 15-20% s/p ICD, decreased RV function, mild MR/TR, DM type I w/ neuropathy and hx hypoglycemia , DL, HTN, CAD, hx MI, s/p CABG, s/p stent (2018), hx in-stent thrombosis while on Plavix, PAD s/p 3 LLE stents, TIAGO (needs CPAP auto-regulating pressure 10-16, patient doesn't use it due to claustrophobia), Psoriasis, R-AKA presenting to ED s/p fall. He states he was lying in bed at home when he fell asleep and fell off the bed, and complains of generalized headache neck pain, non-radiating, no alleviating or aggravating factors, associated with right anterior chest wall pain. Denies LOC,  fevers, chills, nausea, vomiting, dizziness, abdominal pain, shortness of breath. States he had TDAP recently. Pt was not down for long as wife was upstairs when he fell off the bed and called EMS right away.   Labs significant for cr 1.8 (baseline ~ 1.2)  trop 0.10 (18 May 2023 14:16)    Patient reports having a known history of low EF, follows w/ Dr. Foote (primary cardiologist) and Dr. Mejia (EP). Stated he takes Lasix 20mg ~ daily at home, recently increased to 40mg daily as he has been experiencing progressively worsening SOB x2 weeks. Admits to eating a moderate Na diet at home. Endorses compliance with home GDMT. States he has been mostly wheelchair bound x1 year 2/2 to musculoskeletal issues.        PAST MEDICAL & SURGICAL HISTORY:  Status post percutaneous transluminal coronary angioplasty  2 stents  Atherosclerosis of coronary artery  CAD (coronary artery disease)  Osteoarthritis  HLD (hyperlipidemia)  Diabetes  on insulin pump  CHF (congestive heart failure)  EF ~ 25%  History of celiac disease  Diverticulitis  STEMI (ST elevation myocardial infarction)  Diabetic neuropathy  Hands and Feet  Anxiety and depression  Other postprocedural status  Fixation hardware in foot LEFT  Stented coronary artery  10/18 heart attack  INFERIOR WALL MI  S/P CABG x 1  2018  S/P TKR (total knee replacement), right  with infection Mrsa  per pt he was cleared from MRSA infection  Surgery, elective  right knee wound debridement  History of open reduction and internal fixation (ORIF) procedure  right hip  H/O shoulder surgery  right  AICD (automatic cardioverter/defibrillator) present  [facility]  Cholecystostomy care  drain inserted  & removed 4 months ago  History of tonsillectomy  History of hip replacement, total, right  Elective surgery  plastic surgery Left shin      FAMILY HISTORY: Patient was adopted, family history unknown to him  SOCIAL HISTORY:  Denies smoking, alcohol, or drug use      Allergies  ACE inhibitors (Rash)  Entresto (Swelling)  Atorvastatin Calcium (Unknown)  Bactrim (Unknown)  Plavix (Unknown)    Intolerances      PHYSICAL EXAM:  T(C): 36.4 (2023 07:54), Max: 36.4 (2023 07:54)  T(F): 97.5 (2023 07:54), Max: 97.5 (2023 07:54)  HR: 87 (2023 07:54) (80 - 91)  BP: 104/62 (2023 07:54) (91/51 - 109/67)  BP(mean): --  RR: 18 (2023 07:54) (18 - 18)  SpO2: 97% (2023 07:54) (95% - 97%)    O2 Parameters below as of 2023 07:54  Patient On (Oxygen Delivery Method): nasal cannula  O2 Flow (L/min): 2      General Appearance: normal for age and gender. 	  Neck: JVP [id number]   Eyes: Extra Ocular muscles intact.   Cardiovascular: regular rate and rhythm S1 S2, No edema  Respiratory: decreased B/L  Psychiatry: Awake, alert, oriented x 2-3, poor historian  Gastrointestinal:  Soft, Non-tender  Skin/Integumentary: No rashes, No ecchymoses, No cyanosis	  Neurologic: Non-focal  Musculoskeletal/ extremities: R AKA, No clubbing, cyanosis or edema  Vascular: Peripheral pulses palpable 2+ B/L UE         LABS:	 	    CBC Full  -  ( 2023 08:48 )  WBC Count : 7.39 K/uL  RBC Count : 4.92 M/uL  Hemoglobin : 10.2 g/dL  Hematocrit : 35.7 %  Platelet Count - Automated : 202 K/uL  Mean Cell Volume : 72.6 fL  Mean Cell Hemoglobin : 20.7 pg  Mean Cell Hemoglobin Concentration : 28.6 g/dL  Auto Neutrophil # : x  Auto Lymphocyte # : x  Auto Monocyte # : x  Auto Eosinophil # : x  Auto Basophil # : x  Auto Neutrophil % : x  Auto Lymphocyte % : x  Auto Monocyte % : x  Auto Eosinophil % : x  Auto Basophil % : x    Comprehensive Metabolic Panel (23 @ 04:38)    Sodium, Serum: 140 mmol/L   Potassium, Serum: 4.1 mmol/L   Chloride, Serum: 98 mmol/L   Carbon Dioxide, Serum: 30 mmol/L   Anion Gap, Serum: 12 mmol/L   Blood Urea Nitrogen, Serum: 27 mg/dL   Creatinine, Serum: 1.5 mg/dL   Glucose, Serum: 150 mg/dL   Calcium, Total Serum: 8.7 mg/dL   Protein Total, Serum: 5.8 g/dL   Albumin, Serum: 3.0 g/dL   Bilirubin Total, Serum: 1.6 mg/dL   Alkaline Phosphatase, Serum: 113 U/L   Aspartate Aminotransferase (AST/SGOT): 18 U/L   Alanine Aminotransferase (ALT/SGPT): 16 U/L   eGFR: 51: The estimated glomerular filtration rate (eGFR) is calculated using the   CKD-EPI creatinine equation, which does not have a coefficient for  race and is validated in individuals 18 years of age and older (N Engl J  Med ; [id number]). Creatinine-based eGFR may be inaccurate in  various situations including but not limited to extremes of muscle mass,  altered dietary protein intake, or medications that affect renal tubular  creatinine secretion. mL/min/1.73m2        CARDIAC MARKERS:  Pro-Brain Natriuretic Peptide: 9132 pg/mL (23 @ 07:01)        TELEMETRY EVENTS: 	    EC23    Ventricular Rate 79 BPM  Atrial Rate 79 BPM  P-R Interval 170 ms  QRS Duration 158 ms  Q-T Interval 480 ms  QTC Calculation(Bazett) 550 ms  P Axis 51 degrees  R Axis -35 degrees  T Axis 65 degrees    Diagnosis Line Atrial-sensed ventricular-paced rhythm  Abnormal ECG    Confirmed by London Santos ([id number]) on 2023 4:12:01 PM      PREVIOUS DIAGNOSTIC TESTING:    TTE 23    Summary:   1. Left ventricular ejection fraction, by visual estimation, is <20%.   2. Severely decreased global left ventricular systolic function.   3. Mildly increased LV wall thickness.   4. Severe concentric left ventricular hypertrophy.   5. Spectral Doppler shows restrictive pattern of left ventricular   myocardial filling (Grade III diastolic dysfunction).   6. Moderately enlarged right ventricle.   7. Moderately reduced RV systolic function.   8. Elevated mean left atrial pressure.   9. Moderately enlarged left atrium.  10.Moderate mitral valve regurgitation.  11. The mitral valve leaflets are tethered which is due to reduced   systolic function and elevated LVDP.  12. Moderate-severe tricuspid regurgitation.  13. Estimated pulmonary artery systolic pressure is 48.4 mmHg assuming a   right atrial pressure of 8 mmHg, which is consistent with mild pulmonary   hypertension.  14. Patient is in normal sinus rhythm.  15. Compared to the prior TTE dated 21, the patient's cardiomyopathy   has worsened.      Left Heart Catheterization: 18    IMPRESSIONS: There is significant single vessel native coronary artery  disease. Patent LIMA to LAD. No significant restenosis in RCA/OM1.    	    Home Medications:  aspirin 81 mg oral delayed release tablet: 1 tab(s) orally once a day (2023 02:35)  diazePAM 2 mg oral tablet: 0.5 tab(s) orally once a day (at bedtime) (2023 02:35)  DULoxetine 30 mg oral delayed release capsule: 1 cap(s) orally 3 times a day (2023 02:35)  insulin glargine 100 units/mL subcutaneous solution: 25 unit(s) subcutaneous once a day (at bedtime) (2023 02:35)  insulin glargine 100 units/mL subcutaneous solution: 40 microcurie subcutaneous once a day (2023 02:35)  insulin lispro 100 units/mL injectable solution: if your finger stick is 150-199 please inject 2 units  if your finger stick is 200-250 please inject 4 units  if your finger stick is 251-300 please inject 6 units  if your finger stick is 301-350 please inject 8 units  if your finger stick is more than 350 please call your physician    (2023 02:35)  levothyroxine 25 mcg (0.025 mg) oral tablet: 1 tab(s) orally 6 times a week (2023 02:35)  levothyroxine 50 mcg (0.05 mg) oral tablet: 1 tab(s) orally once a week (2023 02:35)  metoprolol succinate 25 mg oral tablet, extended release: 1 tab(s) orally once a day (2023 02:35)  Multiple Vitamins oral tablet: 1 tab(s) orally once a day (2023 02:35)  pantoprazole 40 mg oral delayed release tablet: 1 tab(s) orally once a day (before a meal) (:35)  prasugrel 10 mg oral tablet: 1 tab(s) orally once a day (:35)  rosuvastatin 40 mg oral tablet: 1 tab(s) orally once a day (:35)  spironolactone 25 mg oral tablet: 1 tab(s) orally once a day (2023 02:35)        MEDICATIONS  (STANDING):  aspirin enteric coated 81 milliGRAM(s) Oral daily  dextrose 5% + sodium chloride 0.9%. 1000 milliLiter(s) (50 mL/Hr) IV Continuous <Continuous>  dextrose 5%. 1000 milliLiter(s) (100 mL/Hr) IV Continuous <Continuous>  dextrose 5%. 1000 milliLiter(s) (50 mL/Hr) IV Continuous <Continuous>  dextrose 50% Injectable 25 Gram(s) IV Push once  dextrose 50% Injectable 12.5 Gram(s) IV Push once  dextrose 50% Injectable 25 Gram(s) IV Push once  diazepam    Tablet 1 milliGRAM(s) Oral at bedtime  DULoxetine 30 milliGRAM(s) Oral <User Schedule>  furosemide   Injectable 20 milliGRAM(s) IV Push two times a day  glucagon  Injectable 1 milliGRAM(s) IntraMuscular once  heparin   Injectable 5000 Unit(s) SubCutaneous every 12 hours  hydrocortisone 2.5% Cream 1 Application(s) Topical two times a day  insulin glargine Injectable (LANTUS) 12 Unit(s) SubCutaneous at bedtime  insulin lispro (ADMELOG) corrective regimen sliding scale   SubCutaneous three times a day before meals  lactulose Syrup 10 Gram(s) Oral at bedtime  levothyroxine 25 MICROGram(s) Oral <User Schedule>  levothyroxine 50 MICROGram(s) Oral <User Schedule>  metoprolol succinate ER 25 milliGRAM(s) Oral daily  midodrine. 10 milliGRAM(s) Oral three times a day  pantoprazole    Tablet 40 milliGRAM(s) Oral before breakfast  polyethylene glycol 3350 17 Gram(s) Oral two times a day  prasugrel 10 milliGRAM(s) Oral daily  senna 2 Tablet(s) Oral at bedtime    MEDICATIONS  (PRN):  dextrose Oral Gel 15 Gram(s) Oral once PRN Blood Glucose LESS THAN 70 milliGRAM(s)/deciliter

## 2023-06-02 NOTE — PROGRESS NOTE ADULT - ASSESSMENT
Assessment: 67-year-old male w/ HFrEF 15-20% s/p ICD, DM type I, DL, HTN, CAD, hx MI, s/p CABG, s/p stent (2018), hx in-stent thrombosis while on Plavix, PAD s/p 3 LLE stents, TIAGO non-compliant w/ CPAP, R-AKA presenting to ED s/p fall (mechanical). Patient found to be fluid overloaded with MOISES, heart failure consulted for further management of ADHF.      Plan:  Patient remains mildly fluid overloaded on exam 2/2 acute decompensated heart failure  POCUS exam: IVC dilated, non collapsing  Start Torsemide 20mg BID x5 days, then decrease to Torsemide 20mg daily, take an extra tablet if a weight gain of 2 lbs or more in one day upon discharge   Continue low dose BB  Consider reinstating home spironolactone   Get BMP twice daily with magnesium   Maintain potassium >4.0, Mg >2.2  Continue to wean midodrine as tolerated- goal SBP > 85 mmHg  Strict intake and output  Daily weight   Physical therapy follow-up / OOB to chair   s/p IV iron sucrose 5 day course   Patient to follow up with primary cardiologist within 1-2 weeks from discharge   Plan discussed with primary team Assessment: 67-year-old male w/ HFrEF 15-20% s/p ICD, DM type I, DL, HTN, CAD, hx MI, s/p CABG, s/p stent (2018), hx in-stent thrombosis while on Plavix, PAD s/p 3 LLE stents, TIAGO non-compliant w/ CPAP, R-AKA presenting to ED s/p fall (mechanical). Patient found to be fluid overloaded with MOISES, heart failure consulted for further management of ADHF.      Plan:  Patient remains mildly fluid overloaded on exam 2/2 acute decompensated heart failure  POCUS exam: IVC dilated, non collapsing  Start Torsemide 20mg BID x5 days, then decrease to Torsemide 20mg daily, take an extra tablet if a weight gain of 2 lbs or more in one day upon discharge   Continue low dose BB  Consider reinstating home spironolactone   Get BMP twice daily with magnesium   Maintain potassium >4.0, Mg >2.2  Continue to wean midodrine as tolerated- goal SBP > 85 mmHg  Strict intake and output  Daily weight   Physical therapy follow-up / OOB to chair   s/p IV iron sucrose 5 day course   Patient to follow up with primary cardiologist within 1-2 weeks from discharge   Plan discussed with primary team

## 2023-06-02 NOTE — PROGRESS NOTE ADULT - PROBLEM SELECTOR PLAN 8
Ongoing care, sees pain management in community  Currently getting  Oxycodone 5mg PO Q 6 hrs PRN for pain 4-10 - used x 1 in 24hrs   Cymbalta 30mg PO TID

## 2023-06-02 NOTE — PROGRESS NOTE ADULT - ASSESSMENT
68 yo M PMHx chronic HFrEF 15-20% s/p ICD, decreased RV function, mild MR/TR, DM type I w/ neuropathy and hx hypoglycemia, DLD, HTN, CAD, hx MI, s/p CABG, s/p stent (2018), hx in-stent thrombosis while on Plavix, PAD s/p 3 LLE stents, TIAGO (needs CPAP auto-regulating pressure 10-16, patient doesn't use it due to claustrophobia), Psoriasis, R-AKA presenting to ED s/p fall. He states he was lying in bed at home when he fell asleep and fell off the bed. Found to have moises and mild elevation of trop     A/P:   #Acute on Chronic HFrEF:   Patient with SOB, Pro-BNP 9100  CT chest and abdomen showed moderate tp large bilateral pleural effusions, right greater than left. Small to moderate volume abdominopelvic ascites.  CXR 5/29 is still with bilateral pleural effusion and interstitial edema.   Echo 5/24/23 showed LVEF <20%, mod MR, mod to severe TR.   s/p Bumex 2mg IV BID and Hypertonic saline 3% BID. Started on Torsemide 20mg daily on 5/29 per heart failure team.   Continue Metoprolol. On Midodrine, wean off   History of anaphylactic reaction" to ACE-I (presumably angioedema?) and has allergy to Entresto  Low sodium diet, fluid restriction 1.2L/day,   6/1: Repeat CXR better but still congested. D-dimer 437. Patient still short winded when talking. Orthopnea+.   Wishes to go home though.  HF followed up on the patient: torsemide 20 BID for 5 days and then QD       Delirium: likely from hospital stay and medical problem  Seen by psych, stable.     CAD s/p PCI  Type 2 NSTEMI due to demand ischemia:   Troponin 0.09>0.10  EKG showed paced rhythm.   Continue Aspirin, Prasugrel, Metoprolol, add Lipitor 40mg po daily.    Mechanical Fall   No CT evidence for acute traumatic injury within the chest, abdomen or pelvis  Fall Precaution, PT follow up.     MOISES on CKD III  likely cardiorenal vs obstruction.   Cr increased to 2.6, now improving to 1.2, baseline 1.1  Kidney US:  showed Mild left hydronephrosis/dilated pelvis, unchanged. Left lower pole 0.5 cm nonobstructive calculus.  s/p Loja placement.     Iron deficiency anemia:   Iron studies reviewed Iron level 13, Iron sat 3%  Continue Venofer 200mg IV daily for 5 days.      Transaminitis  Mild elevation, AST/ALT< resolved, likely congestive hepatopathy.     DM type 1 complicated by hypoglycemia:  episodes of hypoglycemia at home  Continue Lantus and insulin sliding scale.     Hypothyroidism: continue Synthroid    Recent dx of RA   Cont Methotrexate weekly.     DVT prophylaxis: Heparin SC.   full code    Very poor prognosis, advanced heart failure, recurrent admission, palliative follow up.      66 yo M PMHx chronic HFrEF 15-20% s/p ICD, decreased RV function, mild MR/TR, DM type I w/ neuropathy and hx hypoglycemia, DLD, HTN, CAD, hx MI, s/p CABG, s/p stent (2018), hx in-stent thrombosis while on Plavix, PAD s/p 3 LLE stents, TIAGO (needs CPAP auto-regulating pressure 10-16, patient doesn't use it due to claustrophobia), Psoriasis, R-AKA presenting to ED s/p fall. He states he was lying in bed at home when he fell asleep and fell off the bed. Found to have moises and mild elevation of trop     A/P:   #Acute on Chronic HFrEF:   Patient with SOB, Pro-BNP 9100  CT chest and abdomen showed moderate tp large bilateral pleural effusions, right greater than left. Small to moderate volume abdominopelvic ascites.  CXR 5/29 is still with bilateral pleural effusion and interstitial edema.   Echo 5/24/23 showed LVEF <20%, mod MR, mod to severe TR.   s/p Bumex 2mg IV BID and Hypertonic saline 3% BID. Started on Torsemide 20mg bid  6/2  Continue Metoprolol. On Midodrine, wean off   History of anaphylactic reaction" to ACE-I (presumably angioedema?) and has allergy to Entresto  Low sodium diet, fluid restriction 1.2L/day,   6/1: Repeat CXR better but still congested. D-dimer 437. Patient still short winded when talking. Orthopnea+.   Wishes to go home though.  HF followed up on the patient: torsemide 20 BID for 5 days and then QD       Delirium: likely from hospital stay and medical problem  Seen by bennett mendoza.     CAD s/p PCI  Type 2 NSTEMI due to demand ischemia:   Troponin 0.09>0.10  EKG showed paced rhythm.   Continue Aspirin, Prasugrel, Metoprolol, add Lipitor 40mg po daily.    Mechanical Fall   No CT evidence for acute traumatic injury within the chest, abdomen or pelvis  Fall Precaution, PT follow up.     MOISES on CKD III  likely cardiorenal vs obstruction.   Cr increased to 2.6, now improving to 1.2, baseline 1.1  Kidney US:  showed Mild left hydronephrosis/dilated pelvis, unchanged. Left lower pole 0.5 cm nonobstructive calculus.  s/p Loja placement.     Iron deficiency anemia:   Iron studies reviewed Iron level 13, Iron sat 3%  Continue Venofer 200mg IV daily for 5 days.      Transaminitis  Mild elevation, AST/ALT< resolved, likely congestive hepatopathy.     DM type 1 complicated by hypoglycemia:  episodes of hypoglycemia at home  Continue Lantus and insulin sliding scale.     Hypothyroidism: continue Synthroid    Recent dx of RA   Cont Methotrexate weekly.     DVT prophylaxis: Heparin SC.   full code    Very poor prognosis, advanced heart failure, recurrent admission, palliative follow up.

## 2023-06-02 NOTE — PROGRESS NOTE ADULT - PROBLEM SELECTOR PLAN 5
Needs ADL assist, wife would like him to have PT eval to be able to transfer safely   Cymbalta for neuropathic/phantom pain as per home schedule

## 2023-06-02 NOTE — PROGRESS NOTE ADULT - SUBJECTIVE AND OBJECTIVE BOX
HPI:  67-year-old male w/ HFrEF 15-20% s/p ICD, decreased RV function, mild MR/TR, DM type I w/ neuropathy and hx hypoglycemia , DL, HTN, CAD, hx MI, s/p CABG, s/p stent (2018), hx in-stent thrombosis while on Plavix, PAD s/p 3 LLE stents, TIAGO (needs CPAP auto-regulating pressure 10-16, patient doesn't use it due to claustrophobia), Psoriasis, R-AKA presenting to ED s/p fall. He states he was lying in bed at home when he fell asleep and fell off the bed, and complains of generalized headache neck pain, non-radiating, no alleviating or aggravating factors, associated with right anterior chest wall pain. Denies LOC,  fevers, chills, nausea, vomiting, dizziness, abdominal pain, shortness of breath. States he had TDAP recently. Pt was not down for long as wife was upstairs when he fell off the bed and called EMS right away.     ED  Vital Signs  T(F):Max: 97.9   HR: 76 - 110  BP: 100/61 - 102/62  RR: 18   SpO2: 94% - 96% on room air     Labs significant for cr 1.8 (baseline ~ 1.2)  trop 0.10             (18 May 2023 14:16)      Interval History    ADVANCE DIRECTIVES:    [ ] Full Code [ ] DNR  MOLST  [ ]  Living Will  [ ]   DECISION MAKER(s):  [ ] Health Care Proxy(s)  [ ] Surrogate(s)  [ ] Guardian           Name(s): Phone Number(s):    BASELINE (I)ADL(s) (prior to admission):  [place]: [ ]Total  [ ] Moderate [ ]Dependent  Palliative Performance Status Version 2:         %    http://npcrc.org/files/[email or web address]_performance_[email or web address]_[email or web address]    Allergies    ACE inhibitors (Rash)  Entresto (Swelling)  Atorvastatin Calcium (Unknown)  Bactrim (Unknown)  Plavix (Unknown)    Intolerances    MEDICATIONS  (STANDING):  aspirin enteric coated 81 milliGRAM(s) Oral daily  chlorhexidine 2% Cloths 1 Application(s) Topical daily  dextrose 5% + sodium chloride 0.9%. 1000 milliLiter(s) (50 mL/Hr) IV Continuous <Continuous>  dextrose 5%. 1000 milliLiter(s) (100 mL/Hr) IV Continuous <Continuous>  dextrose 5%. 1000 milliLiter(s) (50 mL/Hr) IV Continuous <Continuous>  dextrose 50% Injectable 25 Gram(s) IV Push once  dextrose 50% Injectable 12.5 Gram(s) IV Push once  dextrose 50% Injectable 25 Gram(s) IV Push once  diazepam    Tablet 1 milliGRAM(s) Oral at bedtime  DULoxetine 30 milliGRAM(s) Oral <User Schedule>  folic acid 1 milliGRAM(s) Oral <User Schedule>  glucagon  Injectable 1 milliGRAM(s) IntraMuscular once  heparin   Injectable 5000 Unit(s) SubCutaneous every 12 hours  hydrocortisone 2.5% Cream 1 Application(s) Topical two times a day  insulin glargine Injectable (LANTUS) 5 Unit(s) SubCutaneous at bedtime  insulin lispro (ADMELOG) corrective regimen sliding scale   SubCutaneous three times a day before meals  lactulose Syrup 10 Gram(s) Oral at bedtime  levothyroxine 25 MICROGram(s) Oral <User Schedule>  levothyroxine 50 MICROGram(s) Oral <User Schedule>  magnesium sulfate  IVPB 2 Gram(s) IV Intermittent once  methotrexate (Non - oncologic) 10 milliGRAM(s) Oral every week  metoprolol succinate ER 25 milliGRAM(s) Oral daily  midodrine. 10 milliGRAM(s) Oral three times a day  pantoprazole    Tablet 40 milliGRAM(s) Oral before breakfast  polyethylene glycol 3350 17 Gram(s) Oral two times a day  prasugrel 10 milliGRAM(s) Oral daily  QUEtiapine 25 milliGRAM(s) Oral daily  senna 2 Tablet(s) Oral at bedtime  torsemide 20 milliGRAM(s) Oral two times a day    MEDICATIONS  (PRN):  acetaminophen     Tablet .. 325 milliGRAM(s) Oral every 4 hours PRN Moderate Pain (4 - 6)  dextrose Oral Gel 15 Gram(s) Oral once PRN Blood Glucose LESS THAN 70 milliGRAM(s)/deciliter  oxyCODONE    IR 5 milliGRAM(s) Oral every 6 hours PRN Pain 4-10    PRESENT SYMPTOMS: [ ]Unable to obtain due to poor mentation   Source if other than patient:  [ ]Family   [ ]Team     Pain: [ ]yes [ ]no  QOL impact -   Location -                    Aggravating factors -  Quality -  Radiation -  Timing-  Severity (0-10 scale):  Minimal acceptable level (0-10 scale):     CPOT:    https://[email or web address]/Critical-Care-Pain-Observation-Tool-(CPOT)    PAIN AD Score:   [email or web address]/painad.pdf (press ctrl +  left click to view)    Dyspnea:                           [ ]None[ ]Mild [ ]Moderate [ ]Severe     Respiratory Distress Observation Scale (RDOS):   A score of 0 to 2 signifies little or no respiratory distress, 3 signifies mild distress, scores 4 to 6 indicate moderate distress, and scores greater than 7 signify severe distress  [email or web address]    Anxiety:                             [ ]None[ ]Mild [ ]Moderate [ ]Severe   Fatigue:                             [ ]None[ ]Mild [ ]Moderate [ ]Severe   Nausea:                             [ ]None[ ]Mild [ ]Moderate [ ]Severe   Loss of appetite:              [ ]None[ ]Mild [ ]Moderate [ ]Severe   Constipation:                    [ ]None[ ]Mild [ ]Moderate [ ]Severe    Other Symptoms:  [ ]All other review of systems negative     Palliative Performance Status Version 2:         %    [email or web address]  PHYSICAL EXAM:  Vital Signs Last 24 Hrs  T(C): 36.4 (02 Jun 2023 07:54), Max: 36.4 (02 Jun 2023 07:54)  T(F): 97.5 (02 Jun 2023 07:54), Max: 97.5 (02 Jun 2023 07:54)  HR: 84 (02 Jun 2023 11:24) (80 - 91)  BP: 96/59 (02 Jun 2023 11:24) (91/51 - 109/67)  BP(mean): --  RR: 18 (02 Jun 2023 07:54) (18 - 18)  SpO2: 97% (02 Jun 2023 07:54) (95% - 97%)    Parameters below as of 02 Jun 2023 07:54  Patient On (Oxygen Delivery Method): nasal cannula  O2 Flow (L/min): 2   I&O's Summary    01 Jun 2023 07:01  -  02 Jun 2023 07:00  --------------------------------------------------------  IN: 1380 mL / OUT: 1160 mL / NET: 220 mL    02 Jun 2023 07:01  -  02 Jun 2023 12:17  --------------------------------------------------------  IN: 360 mL / OUT: 0 mL / NET: 360 mL      GENERAL:  [ ] No acute distress [ ]Lethargic  [ ]Unarousable  [ ]Verbal  [ ]Non-Verbal [ ]Cachexia    BEHAVIORAL/PSYCH:  [x ]Alert and Oriented x  1[ ] Anxiety [ ] Delirium [x ] Agitation [ ] Calm   EYES: [x ] No scleral icterus [ ] Scleral icterus [ ] Closed  ENMT:  [ ]Dry mouth  [ ]No external oral lesions [ ] No external ear or nose lesions  CARDIOVASCULAR:  [x ]Regular [ ]Irregular [ ]Tachy [ ]Not Tachy  [ ][place] [ ] Edema [ ] No edema  PULMONARY:  [ ]Tachypnea  [ ]Audible excessive secretions [ x] No labored breathing [ ] labored breathing  GASTROINTESTINAL: [ x]Soft  [ ]Distended  [ ]Not distended [ ]Non tender [ ]Tender  MUSCULOSKELETAL: [ ]No clubbing [ ] clubbing  [ ] No cyanosis [ ] cyanosis  NEUROLOGIC: [ ]No focal deficits  [ ]Follows commands  [ ]Does not follow commands  [x ]Cognitive impairment  [ ]Dysphagia  [ ]Dysarthria  [ ]Paresis   SKIN: [ ] Jaundiced [x ] Non-jaundiced [ ]Rash [ ]No Rash [ ] Warm [ x] Multiple small sores   MISC/LINES: [ ] ET tube [ ] Trach [ ]NGT/OGT [ ]PEG [ x]Loja    CRITICAL CARE:  [ ] Shock Present  [ ]Septic [ ]Cardiogenic [ ]Neurologic [ ]Hypovolemic  [ ]  Vasopressors [ ]  Inotropes   [ ]Respiratory failure present [ ]Mechanical ventilation [ ]Non-invasive ventilatory support [ ]High flow  [ ]Acute  [ ]Chronic [ ]Hypoxic  [ ]Hypercarbic [ ]Other  [ ]Other organ failure    LABS: reviewed by me                        10.2   7.39  )-----------( 202      ( 02 Jun 2023 08:48 )             35.7   06-02    140  |  97<L>  |  28<H>  ----------------------------<  112<H>  4.5   |  31  |  1.5    Ca    8.7      02 Jun 2023 08:48  Mg     1.9     06-01    TPro  6.2  /  Alb  3.2<L>  /  TBili  1.7<H>  /  DBili  x   /  AST  27  /  ALT  16  /  AlkPhos  109  06-02        RADIOLOGY & ADDITIONAL STUDIES: reviewed by me    EKG: reviewed by me    REFERRALS:   [ ]Chaplaincy  [ ]Hospice  [ ]Child Life  [ x]Social Work  [x ]Case management [ ]Holistic Therapy     Patient discussed with primary medical team MD  Palliative care education provided to patient and/or family    Goals of Care Document:

## 2023-06-02 NOTE — PROGRESS NOTE ADULT - ASSESSMENT
68 yo M PMHx chronic HFrEF 15-20% s/p ICD, decreased RV function, mild MR/TR, DM type I w/ neuropathy and hx hypoglycemia, DLD, HTN, CAD, hx MI, s/p CABG, s/p stent (2018), hx in-stent thrombosis while on Plavix, PAD s/p 3 LLE stents, TIAGO (needs CPAP auto-regulating pressure 10-16, patient doesn't use it due to claustrophobia), Psoriasis, R-AKA presenting to ED s/p fall. He states he was lying in bed at home when he fell asleep and fell off the bed. Found to have moises and mild elevation of trop     A/P:   #Acute on Chronic HFrEF:   Patient with SOB, Pro-BNP 9100  CT chest and abdomen showed moderate tp large bilateral pleural effusions, right greater than left. Small to moderate volume abdominopelvic ascites.  CXR 5/29 is still with bilateral pleural effusion and interstitial edema.   Echo 5/24/23 showed LVEF <20%, mod MR, mod to severe TR.   s/p Bumex 2mg IV BID and Hypertonic saline 3% BID. Started on Torsemide 20mg daily on 5/29 per heart failure team.   Continue Metoprolol. On Midodrine, wean off   History of anaphylactic reaction" to ACE-I (presumably angioedema?) and has allergy to Entresto  Low sodium diet, fluid restriction 1.2L/day,   6/1: Repeat CXR better but still congested. D-dimer 437. Patient still short winded when talking. Orthopnea+.   Wishes to go home though.  HF followed up on the patient: torsemide 20 BID for 5 days and then QD       Delirium: likely from hospital stay and medical problem  Seen by psych, stable.     CAD s/p PCI  Type 2 NSTEMI due to demand ischemia:   Troponin 0.09>0.10  EKG showed paced rhythm.   Continue Aspirin, Prasugrel, Metoprolol, add Lipitor 40mg po daily.    Mechanical Fall   No CT evidence for acute traumatic injury within the chest, abdomen or pelvis  Fall Precaution, PT follow up.     MOISES on CKD III  likely cardiorenal vs obstruction.   Cr increased to 2.6, now improving to 1.2, baseline 1.1  Kidney US:  showed Mild left hydronephrosis/dilated pelvis, unchanged. Left lower pole 0.5 cm nonobstructive calculus.  s/p Loja placement.     Iron deficiency anemia:   Iron studies reviewed Iron level 13, Iron sat 3%  Continue Venofer 200mg IV daily for 5 days.      Transaminitis  Mild elevation, AST/ALT< resolved, likely congestive hepatopathy.     DM type 1 complicated by hypoglycemia:  episodes of hypoglycemia at home  Continue Lantus and insulin sliding scale.     Hypothyroidism: continue Synthroid    Recent dx of RA   Cont Methotrexate weekly.     DVT prophylaxis: Heparin SC.   full code    Very poor prognosis, advanced heart failure, recurrent admission, palliative follow up.

## 2023-06-03 NOTE — PROGRESS NOTE ADULT - SUBJECTIVE AND OBJECTIVE BOX
Patient is a 67y old  Male who presents with a chief complaint of Mechanical fall (03 Jun 2023 10:52)      HPI:  67-year-old male w/ HFrEF 15-20% s/p ICD, decreased RV function, mild MR/TR, DM type I w/ neuropathy and hx hypoglycemia , DL, HTN, CAD, hx MI, s/p CABG, s/p stent (2018), hx in-stent thrombosis while on Plavix, PAD s/p 3 LLE stents, TIAGO (needs CPAP auto-regulating pressure 10-16, patient doesn't use it due to claustrophobia), Psoriasis, R-AKA presenting to ED s/p fall. He states he was lying in bed at home when he fell asleep and fell off the bed, and complains of generalized headache neck pain, non-radiating, no alleviating or aggravating factors, associated with right anterior chest wall pain. Denies LOC,  fevers, chills, nausea, vomiting, dizziness, abdominal pain, shortness of breath. States he had TDAP recently. Pt was not down for long as wife was upstairs when he fell off the bed and called EMS right away.     ED  Vital Signs  T(F):Max: 97.9   HR: 76 - 110  BP: 100/61 - 102/62  RR: 18   SpO2: 94% - 96% on room air     Labs significant for cr 1.8 (baseline ~ 1.2)  trop 0.10             (18 May 2023 14:16)      PAST MEDICAL & SURGICAL HISTORY:  Status post percutaneous transluminal coronary angioplasty  2 stents      Atherosclerosis of coronary artery  CAD (coronary artery disease)      Osteoarthritis      HLD (hyperlipidemia)      Diabetes  on insulin pump      CHF (congestive heart failure)  EF ~ 25%      History of celiac disease      Diverticulitis      STEMI (ST elevation myocardial infarction)      Diabetic neuropathy  Hands and Feet      Anxiety and depression      Other postprocedural status  Fixation hardware in foot LEFT      Stented coronary artery  10/18 heart attack  INFERIOR WALL MI      S/P CABG x 1  2018      S/P TKR (total knee replacement), right  with infection Mrsa   per pt he was cleared from MRSA infection      Surgery, elective  right knee wound debridement      History of open reduction and internal fixation (ORIF) procedure  right hip      H/O shoulder surgery  right      AICD (automatic cardioverter/defibrillator) present  [facility]      Cholecystostomy care  drain inserted 2020 & removed 4 months ago      History of tonsillectomy      History of hip replacement, total, right      Elective surgery  plastic surgery Left shin          Home Medications:  aspirin 81 mg oral delayed release tablet: 1 tab(s) orally once a day (23 Feb 2023 02:35)  diazePAM 2 mg oral tablet: 0.5 tab(s) orally once a day (at bedtime) (23 Feb 2023 02:35)  DULoxetine 30 mg oral delayed release capsule: 1 cap(s) orally 3 times a day (23 Feb 2023 02:35)  insulin glargine 100 units/mL subcutaneous solution: 25 unit(s) subcutaneous once a day (at bedtime) (23 Feb 2023 02:35)  insulin glargine 100 units/mL subcutaneous solution: 40 microcurie subcutaneous once a day (23 Feb 2023 02:35)  insulin lispro 100 units/mL injectable solution: if your finger stick is 150-199 please inject 2 units  if your finger stick is 200-250 please inject 4 units  if your finger stick is 251-300 please inject 6 units  if your finger stick is 301-350 please inject 8 units  if your finger stick is more than 350 please call your physician    (23 Feb 2023 02:35)  levothyroxine 25 mcg (0.025 mg) oral tablet: 1 tab(s) orally 6 times a week (23 Feb 2023 02:35)  levothyroxine 50 mcg (0.05 mg) oral tablet: 1 tab(s) orally once a week (23 Feb 2023 02:35)  metoprolol succinate 25 mg oral tablet, extended release: 1 tab(s) orally once a day (23 Feb 2023 02:35)  midodrine 5 mg oral tablet: 1 tab(s) orally 2 times a day (01 Jun 2023 14:59)  Multiple Vitamins oral tablet: 1 tab(s) orally once a day (23 Feb 2023 02:35)  pantoprazole 40 mg oral delayed release tablet: 1 tab(s) orally once a day (before a meal) (23 Feb 2023 02:35)  prasugrel 10 mg oral tablet: 1 tab(s) orally once a day (23 Feb 2023 02:35)  QUEtiapine 25 mg oral tablet: 1 tab(s) orally once a day (01 Jun 2023 14:57)  rosuvastatin 40 mg oral tablet: 1 tab(s) orally once a day (23 Feb 2023 02:35)  spironolactone 25 mg oral tablet: 1 tab(s) orally once a day (23 Feb 2023 02:35)      .  Tobacco use:   EtOH use:  Illicit drug use:    Living situation:    Allergies:  ACE inhibitors (Rash)  Entresto (Swelling)  Atorvastatin Calcium (Unknown)  Bactrim (Unknown)  Plavix (Unknown)      FAMILY HISTORY:  Family history of heart disease (Mother)    Family history of allergies  daughter        Hospital Medications:  acetaminophen     Tablet .. 325 milliGRAM(s) Oral every 4 hours PRN  aspirin enteric coated 81 milliGRAM(s) Oral daily  chlorhexidine 2% Cloths 1 Application(s) Topical daily  dextrose 5% + sodium chloride 0.9%. 1000 milliLiter(s) IV Continuous <Continuous>  dextrose 5%. 1000 milliLiter(s) IV Continuous <Continuous>  dextrose 5%. 1000 milliLiter(s) IV Continuous <Continuous>  dextrose 50% Injectable 25 Gram(s) IV Push once  dextrose 50% Injectable 12.5 Gram(s) IV Push once  dextrose 50% Injectable 25 Gram(s) IV Push once  dextrose Oral Gel 15 Gram(s) Oral once PRN  diazepam    Tablet 1 milliGRAM(s) Oral at bedtime  DULoxetine 30 milliGRAM(s) Oral <User Schedule>  folic acid 1 milliGRAM(s) Oral <User Schedule>  glucagon  Injectable 1 milliGRAM(s) IntraMuscular once  heparin   Injectable 5000 Unit(s) SubCutaneous every 12 hours  hydrocortisone 2.5% Cream 1 Application(s) Topical two times a day  insulin glargine Injectable (LANTUS) 5 Unit(s) SubCutaneous at bedtime  insulin lispro (ADMELOG) corrective regimen sliding scale   SubCutaneous three times a day before meals  lactulose Syrup 10 Gram(s) Oral at bedtime  levothyroxine 25 MICROGram(s) Oral <User Schedule>  levothyroxine 50 MICROGram(s) Oral <User Schedule>  magnesium sulfate  IVPB 2 Gram(s) IV Intermittent once  magnesium sulfate  IVPB 2 Gram(s) IV Intermittent once  methotrexate (Non - oncologic) 10 milliGRAM(s) Oral every week  metoprolol succinate ER 25 milliGRAM(s) Oral daily  midodrine. 10 milliGRAM(s) Oral three times a day  oxyCODONE    IR 5 milliGRAM(s) Oral every 6 hours PRN  pantoprazole    Tablet 40 milliGRAM(s) Oral before breakfast  polyethylene glycol 3350 17 Gram(s) Oral two times a day  prasugrel 10 milliGRAM(s) Oral daily  QUEtiapine 25 milliGRAM(s) Oral daily  senna 2 Tablet(s) Oral at bedtime  torsemide 20 milliGRAM(s) Oral two times a day      Vital Signs Last 24 Hrs  T(C): 36.6 (02 Jun 2023 15:34), Max: 36.6 (02 Jun 2023 15:34)  T(F): 97.8 (02 Jun 2023 15:34), Max: 97.8 (02 Jun 2023 15:34)  HR: 87 (03 Jun 2023 08:00) (78 - 87)  BP: 120/66 (03 Jun 2023 08:00) (100/60 - 120/66)  BP(mean): 75 (02 Jun 2023 15:34) (75 - 75)  RR: 18 (03 Jun 2023 08:00) (18 - 18)  SpO2: 95% (03 Jun 2023 08:00) (95% - 99%)    Parameters below as of 03 Jun 2023 08:00  Patient On (Oxygen Delivery Method): nasal cannula  O2 Flow (L/min): 2      I&O's Summary    02 Jun 2023 07:01  -  03 Jun 2023 07:00  --------------------------------------------------------  IN: 720 mL / OUT: 350 mL / NET: 370 mL    03 Jun 2023 07:01  -  03 Jun 2023 12:40  --------------------------------------------------------  IN: 0 mL / OUT: 200 mL / NET: -200 mL        LABS:                        10.2   7.39  )-----------( 202      ( 02 Jun 2023 08:48 )             35.7       06-02    140  |  98  |  29<H>  ----------------------------<  116<H>  4.6   |  29  |  1.7<H>    Ca    8.9      02 Jun 2023 22:25  Mg     2.0     06-02    TPro  6.2  /  Alb  3.2<L>  /  TBili  1.7<H>  /  DBili  x   /  AST  27  /  ALT  16  /  AlkPhos  109  06-02                PHYSICAL EXAM:  GENERAL: NAD  HEAD:  Atraumatic, Normocephalic  EYES: EOMI, PERRLA, conjunctiva and sclera clear  ENT: Moist mucous membranes  NECK: Supple, No JVD  CHEST/LUNG: decreased breathing sounds BL.   HEART: RRR  ABDOMEN: Bowel sounds present; Soft, Nontender, Nondistended. No hepatomegally  EXTREMITIES:  2+ Peripheral Pulses, brisk capillary refill. No clubbing, cyanosis, or edema  NERVOUS SYSTEM:  Alert & Oriented x0   MSK: FROM all 4 extremities, full and equal strength  SKIN: wounds on his body throughout     IMAGES:

## 2023-06-03 NOTE — PROGRESS NOTE ADULT - SUBJECTIVE AND OBJECTIVE BOX
Patient is a 67y old  Male who presents with a chief complaint of Mechanical fall (02 Jun 2023 16:24)      Patient seen and examined at bedside.    ALLERGIES:  ACE inhibitors (Rash)  Entresto (Swelling)  Atorvastatin Calcium (Unknown)  Bactrim (Unknown)  Plavix (Unknown)    MEDICATIONS:  acetaminophen     Tablet .. 325 milliGRAM(s) Oral every 4 hours PRN  aspirin enteric coated 81 milliGRAM(s) Oral daily  chlorhexidine 2% Cloths 1 Application(s) Topical daily  dextrose 5% + sodium chloride 0.9%. 1000 milliLiter(s) IV Continuous <Continuous>  dextrose 5%. 1000 milliLiter(s) IV Continuous <Continuous>  dextrose 5%. 1000 milliLiter(s) IV Continuous <Continuous>  dextrose 50% Injectable 25 Gram(s) IV Push once  dextrose 50% Injectable 12.5 Gram(s) IV Push once  dextrose 50% Injectable 25 Gram(s) IV Push once  dextrose Oral Gel 15 Gram(s) Oral once PRN  diazepam    Tablet 1 milliGRAM(s) Oral at bedtime  DULoxetine 30 milliGRAM(s) Oral <User Schedule>  folic acid 1 milliGRAM(s) Oral <User Schedule>  glucagon  Injectable 1 milliGRAM(s) IntraMuscular once  heparin   Injectable 5000 Unit(s) SubCutaneous every 12 hours  hydrocortisone 2.5% Cream 1 Application(s) Topical two times a day  insulin glargine Injectable (LANTUS) 5 Unit(s) SubCutaneous at bedtime  insulin lispro (ADMELOG) corrective regimen sliding scale   SubCutaneous three times a day before meals  lactulose Syrup 10 Gram(s) Oral at bedtime  levothyroxine 25 MICROGram(s) Oral <User Schedule>  levothyroxine 50 MICROGram(s) Oral <User Schedule>  magnesium sulfate  IVPB 2 Gram(s) IV Intermittent once  magnesium sulfate  IVPB 2 Gram(s) IV Intermittent once  methotrexate (Non - oncologic) 10 milliGRAM(s) Oral every week  metoprolol succinate ER 25 milliGRAM(s) Oral daily  midodrine. 10 milliGRAM(s) Oral three times a day  oxyCODONE    IR 5 milliGRAM(s) Oral every 6 hours PRN  pantoprazole    Tablet 40 milliGRAM(s) Oral before breakfast  polyethylene glycol 3350 17 Gram(s) Oral two times a day  prasugrel 10 milliGRAM(s) Oral daily  QUEtiapine 25 milliGRAM(s) Oral daily  senna 2 Tablet(s) Oral at bedtime  torsemide 20 milliGRAM(s) Oral two times a day    Vital Signs Last 24 Hrs  T(F): 97.8 (02 Jun 2023 15:34), Max: 97.8 (02 Jun 2023 15:34)  HR: 87 (03 Jun 2023 08:00) (78 - 87)  BP: 120/66 (03 Jun 2023 08:00) (96/59 - 120/66)  RR: 18 (03 Jun 2023 08:00) (18 - 18)  SpO2: 95% (03 Jun 2023 08:00) (95% - 99%)  I&O's Summary    02 Jun 2023 07:01  -  03 Jun 2023 07:00  --------------------------------------------------------  IN: 720 mL / OUT: 350 mL / NET: 370 mL    03 Jun 2023 07:01  -  03 Jun 2023 10:53  --------------------------------------------------------  IN: 0 mL / OUT: 200 mL / NET: -200 mL        PHYSICAL EXAM:  General: NAD, A/O x 3  ENT: MMM  Neck: Supple, No JVD  Lungs: Clear to auscultation bilaterally  Cardio: RRR, S1/S2, No murmurs  Abdomen: Soft, Nontender, Nondistended; Bowel sounds present  Extremities: No cyanosis, No edema    LABS:                        10.2   7.39  )-----------( 202      ( 02 Jun 2023 08:48 )             35.7     06-02    140  |  98  |  29  ----------------------------<  116  4.6   |  29  |  1.7    Ca    8.9      02 Jun 2023 22:25  Mg     2.0     06-02    TPro  6.2  /  Alb  3.2  /  TBili  1.7  /  DBili  x   /  AST  27  /  ALT  16  /  AlkPhos  109  06-02                            POCT Blood Glucose.: 114 mg/dL (02 Jun 2023 21:56)  POCT Blood Glucose.: 113 mg/dL (02 Jun 2023 16:48)  POCT Blood Glucose.: 141 mg/dL (02 Jun 2023 11:02)              RADIOLOGY & ADDITIONAL TESTS:    Care Discussed with Consultants/Other Providers:  Patient is a 67y old  Male who presents with a chief complaint of Mechanical fall (02 Jun 2023 16:24)      Patient seen and examined at bedside.    ALLERGIES:  ACE inhibitors (Rash)  Entresto (Swelling)  Atorvastatin Calcium (Unknown)  Bactrim (Unknown)  Plavix (Unknown)    MEDICATIONS:  acetaminophen     Tablet .. 325 milliGRAM(s) Oral every 4 hours PRN  aspirin enteric coated 81 milliGRAM(s) Oral daily  chlorhexidine 2% Cloths 1 Application(s) Topical daily  dextrose 5% + sodium chloride 0.9%. 1000 milliLiter(s) IV Continuous <Continuous>  dextrose 5%. 1000 milliLiter(s) IV Continuous <Continuous>  dextrose 5%. 1000 milliLiter(s) IV Continuous <Continuous>  dextrose 50% Injectable 25 Gram(s) IV Push once  dextrose 50% Injectable 12.5 Gram(s) IV Push once  dextrose 50% Injectable 25 Gram(s) IV Push once  dextrose Oral Gel 15 Gram(s) Oral once PRN  diazepam    Tablet 1 milliGRAM(s) Oral at bedtime  DULoxetine 30 milliGRAM(s) Oral <User Schedule>  folic acid 1 milliGRAM(s) Oral <User Schedule>  glucagon  Injectable 1 milliGRAM(s) IntraMuscular once  heparin   Injectable 5000 Unit(s) SubCutaneous every 12 hours  hydrocortisone 2.5% Cream 1 Application(s) Topical two times a day  insulin glargine Injectable (LANTUS) 5 Unit(s) SubCutaneous at bedtime  insulin lispro (ADMELOG) corrective regimen sliding scale   SubCutaneous three times a day before meals  lactulose Syrup 10 Gram(s) Oral at bedtime  levothyroxine 25 MICROGram(s) Oral <User Schedule>  levothyroxine 50 MICROGram(s) Oral <User Schedule>  magnesium sulfate  IVPB 2 Gram(s) IV Intermittent once  magnesium sulfate  IVPB 2 Gram(s) IV Intermittent once  methotrexate (Non - oncologic) 10 milliGRAM(s) Oral every week  metoprolol succinate ER 25 milliGRAM(s) Oral daily  midodrine. 10 milliGRAM(s) Oral three times a day  oxyCODONE    IR 5 milliGRAM(s) Oral every 6 hours PRN  pantoprazole    Tablet 40 milliGRAM(s) Oral before breakfast  polyethylene glycol 3350 17 Gram(s) Oral two times a day  prasugrel 10 milliGRAM(s) Oral daily  QUEtiapine 25 milliGRAM(s) Oral daily  senna 2 Tablet(s) Oral at bedtime  torsemide 20 milliGRAM(s) Oral two times a day    Vital Signs Last 24 Hrs  T(F): 97.8 (02 Jun 2023 15:34), Max: 97.8 (02 Jun 2023 15:34)  HR: 87 (03 Jun 2023 08:00) (78 - 87)  BP: 120/66 (03 Jun 2023 08:00) (96/59 - 120/66)  RR: 18 (03 Jun 2023 08:00) (18 - 18)  SpO2: 95% (03 Jun 2023 08:00) (95% - 99%)  I&O's Summary    02 Jun 2023 07:01  -  03 Jun 2023 07:00  --------------------------------------------------------  IN: 720 mL / OUT: 350 mL / NET: 370 mL    03 Jun 2023 07:01  -  03 Jun 2023 10:53  --------------------------------------------------------  IN: 0 mL / OUT: 200 mL / NET: -200 mL        PHYSICAL EXAM:  General: NAD, A/O x 3, confused and agitated at times   ENT: MMM  Neck: Supple, No JVD  Lungs: Clear to auscultation bilaterally  Cardio: RRR, S1/S2, 2/6 systolic murmur   Abdomen: Soft, Nontender, Nondistended; Bowel sounds present  Extremities: No cyanosis, limbs multiple healing ulcerations     LABS:                        10.2   7.39  )-----------( 202      ( 02 Jun 2023 08:48 )             35.7     06-02    140  |  98  |  29  ----------------------------<  116  4.6   |  29  |  1.7    Ca    8.9      02 Jun 2023 22:25  Mg     2.0     06-02    TPro  6.2  /  Alb  3.2  /  TBili  1.7  /  DBili  x   /  AST  27  /  ALT  16  /  AlkPhos  109  06-02                            POCT Blood Glucose.: 114 mg/dL (02 Jun 2023 21:56)  POCT Blood Glucose.: 113 mg/dL (02 Jun 2023 16:48)  POCT Blood Glucose.: 141 mg/dL (02 Jun 2023 11:02)              RADIOLOGY & ADDITIONAL TESTS:    Care Discussed with Consultants/Other Providers:

## 2023-06-03 NOTE — PROGRESS NOTE ADULT - ASSESSMENT
66 yo M PMHx chronic HFrEF 15-20% s/p ICD, decreased RV function, mild MR/TR, DM type I w/ neuropathy and hx hypoglycemia, DLD, HTN, CAD, hx MI, s/p CABG, s/p stent (2018), hx in-stent thrombosis while on Plavix, PAD s/p 3 LLE stents, TIAGO (needs CPAP auto-regulating pressure 10-16, patient doesn't use it due to claustrophobia), Psoriasis, R-AKA presenting to ED s/p fall. He states he was lying in bed at home when he fell asleep and fell off the bed. Found to have moises and mild elevation of trop     A/P:   #Acute on Chronic HFrEF:   Patient with SOB, Pro-BNP 9100  CT chest and abdomen showed moderate tp large bilateral pleural effusions, right greater than left. Small to moderate volume abdominopelvic ascites.  CXR 5/29 is still with bilateral pleural effusion and interstitial edema.   Echo 5/24/23 showed LVEF <20%, mod MR, mod to severe TR.   s/p Bumex 2mg IV BID and Hypertonic saline 3% BID. Started on Torsemide 20mg bid  6/2  Continue Metoprolol. On Midodrine, wean off   History of anaphylactic reaction" to ACE-I (presumably angioedema?) and has allergy to Entresto  Low sodium diet, fluid restriction 1.2L/day,   6/1: Repeat CXR better but still congested. D-dimer 437. Patient still short winded when talking. Orthopnea+.   HF followed up on the patient: torsemide 20 BID for 5 days and then QD       Delirium   Adjustment disorder  Seen by psych-ok with dc home  Pt continues to get support from staff and family  6/3 overnight very agitated and today increased confusion     CAD s/p PCI  Type 2 NSTEMI due to demand ischemia:   Troponin 0.09>0.10  EKG showed paced rhythm.   Continue Aspirin, Prasugrel, Metoprolol, add Lipitor 40mg po daily.    Mechanical Fall   No CT evidence for acute traumatic injury within the chest, abdomen or pelvis  Fall Precaution, PT follow up.     MOISES on CKD III  likely cardiorenal vs obstruction.   Cr increased to 2.6, now improving to 1.2, baseline 1.1  Kidney US:  showed Mild left hydronephrosis/dilated pelvis, unchanged. Left lower pole 0.5 cm nonobstructive calculus.  s/p Loja placement.     Iron deficiency anemia:   Iron studies reviewed Iron level 13, Iron sat 3%  Continue Venofer 200mg IV daily for 5 days.      Transaminitis  Mild elevation, AST/ALT< resolved, likely congestive hepatopathy.     DM type 1 complicated by hypoglycemia:  episodes of hypoglycemia at home  Continue Lantus and insulin sliding scale.     Hypothyroidism: continue Synthroid    Recent dx of RA   Cont Methotrexate weekly.     DVT prophylaxis: Heparin SC.   full code  GOC discuss with wife 6/3 - recommended hospice and dnr/dni - wife states she will think about things (over 20min spent)    Very poor prognosis, advanced heart failure, recurrent admission, palliative follow up.     Pending: improved mental state plan dc home with home services

## 2023-06-04 NOTE — PROGRESS NOTE ADULT - ASSESSMENT
66 yo M PMHx chronic HFrEF 15-20% s/p ICD, decreased RV function, mild MR/TR, DM type I w/ neuropathy and hx hypoglycemia, DLD, HTN, CAD, hx MI, s/p CABG, s/p stent (2018), hx in-stent thrombosis while on Plavix, PAD s/p 3 LLE stents, TIAGO (needs CPAP auto-regulating pressure 10-16, patient doesn't use it due to claustrophobia), Psoriasis, R-AKA presenting to ED s/p fall. He states he was lying in bed at home when he fell asleep and fell off the bed. Found to have moises and mild elevation of trop     #Change in mental status  #Hypoxemia   #Lactic acidosis - septic shock vs. Cardiogenic shock   -6/4 pt found minimally responsive  -BS -90, ABG - lactate 6.1, pO2-44  -Chest xray - fluid overloaded but no significantly different  -urine, blood cultures are sent, procal sent, BNP-[id number] up from 9000  - Cefepime and vanco started   - Noted worsening LFTs and creatinine - consistent with shock   - 1xNS bolus 250cc given  -critical care was consulted - pt accepted to Step down ICU, Cards consulted for possible CCU admission - pending decision  - EKG down - paced tachycardia, no ST changes   - Plan to repeat abg and consider intubation if remains hypoxemia     #Hyperkalemia  -6/4 pt given insulin and glucose to treat hyperkalemia  -monitor level     #Acute on Chronic HFrEF:   Patient with SOB, Pro-BNP 9100--->89903  CT chest and abdomen showed moderate tp large bilateral pleural effusions, right greater than left. Small to moderate volume abdominopelvic ascites.  CXR 5/29 is still with bilateral pleural effusion and interstitial edema.   Echo 5/24/23 showed LVEF <20%, mod MR, mod to severe TR.   s/p Bumex 2mg IV BID and Hypertonic saline 3% BID. Started on Torsemide 20mg bid  6/2 per heart failure; 6/4 pending cards reassessment may need further diuresis   Continue Metoprolol.   History of anaphylactic reaction" to ACE-I (presumably angioedema?) and has allergy to Entresto  Low sodium diet, fluid restriction 1.2L/day,   6/1: Repeat CXR better but still congested. D-dimer 437. Patient still short winded when talking. Orthopnea+.   HF followed up on the patient: torsemide 20 BID for 5 days and then QD   Pt had been receiving midodrine increased to 10mg bid on 6/2      Delirium   Adjustment disorder  Seen by psych-ok with dc home  Pt continues to get support from staff and family  6/3 overnight very agitated and today increased confusion     CAD s/p PCI  Type 2 NSTEMI due to demand ischemia:   Troponin 0.09>0.10  EKG showed paced rhythm.   Continue Aspirin, Prasugrel, Metoprolol, add Lipitor 40mg po daily.    Mechanical Fall   No CT evidence for acute traumatic injury within the chest, abdomen or pelvis  Fall Precaution, PT follow up.     MOISES on CKD III  likely cardiorenal vs obstruction.   Cr increased to 2.6, now improving to 1.2, baseline 1.1  Kidney US:  showed Mild left hydronephrosis/dilated pelvis, unchanged. Left lower pole 0.5 cm nonobstructive calculus.  s/p Loja placement.     Iron deficiency anemia:   Iron studies reviewed Iron level 13, Iron sat 3%  Continue Venofer 200mg IV daily for 5 days.      Transaminitis  Mild elevation, AST/ALT< resolved, likely congestive hepatopathy.     DM type 1 complicated by hypoglycemia:  episodes of hypoglycemia at home  Continue Lantus and insulin sliding scale.     Hypothyroidism: continue Synthroid    Recent dx of RA   Cont Methotrexate weekly.     DVT prophylaxis: Heparin SC.   full code  GOC discuss with wife 6/3 - recommended hospice and dnr/dni - wife states she will think about things (over 20min spent)  6/4 Wife was informed and at bedside that patient has become critically ill - she continues to state that all measures should be done to preserve life - she has been told that her  may not survive this hospitalization    Pending: cardio evaluation for possible CCU admission, blood culture, urine culture, procal, ID consult, repeat ABG    Very poor prognosis, advanced heart failure, recurrent admission, palliative follow up.     Pending: improved mental state plan dc home with home services

## 2023-06-04 NOTE — PROGRESS NOTE ADULT - SUBJECTIVE AND OBJECTIVE BOX
Patient is a 67y old  Male who presents with a chief complaint of Mechanical fall (03 Jun 2023 12:40)      Patient seen and examined at bedside.    ALLERGIES:  ACE inhibitors (Rash)  Entresto (Swelling)  Atorvastatin Calcium (Unknown)  Bactrim (Unknown)  Plavix (Unknown)    MEDICATIONS:  acetaminophen     Tablet .. 325 milliGRAM(s) Oral every 4 hours PRN  aspirin enteric coated 81 milliGRAM(s) Oral daily  cefepime   IVPB 2000 milliGRAM(s) IV Intermittent every 24 hours  chlorhexidine 2% Cloths 1 Application(s) Topical daily  dextrose 5% + sodium chloride 0.9%. 1000 milliLiter(s) IV Continuous <Continuous>  dextrose 5%. 1000 milliLiter(s) IV Continuous <Continuous>  dextrose 5%. 1000 milliLiter(s) IV Continuous <Continuous>  dextrose 50% Injectable 25 Gram(s) IV Push once  dextrose 50% Injectable 12.5 Gram(s) IV Push once  dextrose 50% Injectable 50 milliLiter(s) IV Push once  dextrose 50% Injectable 25 Gram(s) IV Push once  dextrose Oral Gel 15 Gram(s) Oral once PRN  DULoxetine 30 milliGRAM(s) Oral <User Schedule>  folic acid 1 milliGRAM(s) Oral <User Schedule>  glucagon  Injectable 1 milliGRAM(s) IntraMuscular once  heparin   Injectable 5000 Unit(s) SubCutaneous every 12 hours  hydrocortisone 2.5% Cream 1 Application(s) Topical two times a day  insulin lispro (ADMELOG) corrective regimen sliding scale   SubCutaneous three times a day before meals  insulin regular  human recombinant 10 Unit(s) IV Push once  lactulose Syrup 10 Gram(s) Oral at bedtime  levothyroxine 25 MICROGram(s) Oral <User Schedule>  levothyroxine 50 MICROGram(s) Oral <User Schedule>  magnesium sulfate  IVPB 2 Gram(s) IV Intermittent once  magnesium sulfate  IVPB 2 Gram(s) IV Intermittent once  methotrexate (Non - oncologic) 10 milliGRAM(s) Oral every week  metoprolol succinate ER 25 milliGRAM(s) Oral daily  midodrine. 10 milliGRAM(s) Oral three times a day  oxyCODONE    IR 5 milliGRAM(s) Oral every 6 hours PRN  pantoprazole    Tablet 40 milliGRAM(s) Oral before breakfast  polyethylene glycol 3350 17 Gram(s) Oral two times a day  prasugrel 10 milliGRAM(s) Oral daily  QUEtiapine 25 milliGRAM(s) Oral daily  senna 2 Tablet(s) Oral at bedtime  sodium chloride 0.9% Bolus 250 milliLiter(s) IV Bolus once  torsemide 20 milliGRAM(s) Oral two times a day  vancomycin  IVPB 500 milliGRAM(s) IV Intermittent every 8 hours    Vital Signs Last 24 Hrs  T(F): 97.4 (04 Jun 2023 04:42), Max: 97.4 (04 Jun 2023 04:42)  HR: 114 (04 Jun 2023 08:43) (84 - 114)  BP: 121/58 (04 Jun 2023 08:43) (100/54 - 121/58)  RR: 18 (04 Jun 2023 08:43) (17 - 18)  SpO2: 97% (04 Jun 2023 08:43) (91% - 97%)  I&O's Summary    03 Jun 2023 07:01  -  04 Jun 2023 07:00  --------------------------------------------------------  IN: 0 mL / OUT: 500 mL / NET: -500 mL        PHYSICAL EXAM:  General: NAD, Alert at times oriented x 0, diaphoretic   ENT: MMM  Neck: Supple, No JVD  Lungs: diminished bilaterally, basal crackle   Cardio: IRR, S1/S2, 2/6 systolic murmur   Abdomen: Soft, Nontender, Nondistended; Bowel sounds present  Extremities: No cyanosis, right aka     LABS:                        11.0   11.35 )-----------( 235      ( 04 Jun 2023 08:38 )             37.5     06-04    145  |  97  |  39  ----------------------------<  70  5.5   |  23  |  2.5    Ca    9.5      04 Jun 2023 08:38  Mg     2.1     06-04    TPro  6.5  /  Alb  3.5  /  TBili  3.5  /  DBili  x   /  AST  74  /  ALT  39  /  AlkPhos  130  06-04                    ABG - ( 04 Jun 2023 11:01 )  pH, Arterial: 7.48  pH, Blood: x     /  pCO2: 40    /  pO2: 44    / HCO3: 30    / Base Excess: 5.8   /  SaO2: 65.2                    POCT Blood Glucose.: 90 mg/dL (04 Jun 2023 10:23)  POCT Blood Glucose.: 83 mg/dL (04 Jun 2023 07:30)  POCT Blood Glucose.: 85 mg/dL (04 Jun 2023 05:47)  POCT Blood Glucose.: 93 mg/dL (04 Jun 2023 00:56)  POCT Blood Glucose.: 69 mg/dL (03 Jun 2023 23:07)  POCT Blood Glucose.: 70 mg/dL (03 Jun 2023 21:38)  POCT Blood Glucose.: 83 mg/dL (03 Jun 2023 17:20)              RADIOLOGY & ADDITIONAL TESTS:    Care Discussed with Consultants/Other Providers:

## 2023-06-04 NOTE — PROCEDURE NOTE - NSNEEDLEGAUGE_GEN_A_CORE
18 Elidel Counseling: Patient may experience a mild burning sensation during topical application. Elidel is not approved in children less than 2 years of age. There have been case reports of hematologic and skin malignancies in patients using topical calcineurin inhibitors although causality is questionable.

## 2023-06-05 NOTE — PROGRESS NOTE ADULT - PROBLEM SELECTOR PLAN 1
Transfer to CCU  SWAN catheter to  obtain hemodynamics   Patient currently intubated, sedated  Miilrinone @ 0.125 mcg/kg/min and levofed   Strict intake and output  Daily weight

## 2023-06-05 NOTE — PROGRESS NOTE ADULT - PROBLEM SELECTOR PLAN 5
H/O vascular disease and amputation  Needs assist w all ADLs, pain relief when not sedated would have to be re-assessed

## 2023-06-05 NOTE — PROGRESS NOTE ADULT - ASSESSMENT
MOISES / ATN iso shock / decompensated heart failure  Acute respiratory failure requiring mechanical ventilation    - strict i/o  - repeat UA, urine lytes  - on milrinone, levophed  - on iv bumex / appreciate heart failure f/u   - non oliguric, creat up-trending  - dose all meds for eGFR < 15 while creat is up-trendig  - d/c standing vancomycin order.  Dose by level MOISES / ATN iso shock / decompensated heart failure / septic  Acute respiratory failure requiring mechanical ventilation    - fields, strict i/o  - repeat UA, urine lytes  - on milrinone, levophed  - on iv bumex / appreciate heart failure f/u   - non oliguric, creat up-trending  - dose all meds for eGFR < 15 while creat is up-trendig  - d/c standing vancomycin order.  Dose by level  - no acute indication for RRT  - will follow up

## 2023-06-05 NOTE — PROCEDURE NOTE - NSPROCDETAILS_GEN_ALL_CORE
location identified, draped/prepped, sterile technique used/supine position/ultrasound guidance
guidewire recovered/lumen(s) aspirated and flushed/sterile dressing applied/sterile technique, catheter placed/ultrasound guidance with use of sterile gel and probe cove
sterile technique, indwelling urinary device inserted

## 2023-06-05 NOTE — PROGRESS NOTE ADULT - SUBJECTIVE AND OBJECTIVE BOX
HPI:  67-year-old male w/ HFrEF 15-20% s/p ICD, decreased RV function, mild MR/TR, DM type I w/ neuropathy and hx hypoglycemia , DL, HTN, CAD, hx MI, s/p CABG, s/p stent (2018), hx in-stent thrombosis while on Plavix, PAD s/p 3 LLE stents, TIAGO (needs CPAP auto-regulating pressure 10-16, patient doesn't use it due to claustrophobia), Psoriasis, R-AKA presenting to ED s/p fall. He states he was lying in bed at home when he fell asleep and fell off the bed, and complains of generalized headache neck pain, non-radiating, no alleviating or aggravating factors, associated with right anterior chest wall pain. Denies LOC,  fevers, chills, nausea, vomiting, dizziness, abdominal pain, shortness of breath. States he had TDAP recently. Pt was not down for long as wife was upstairs when he fell off the bed and called EMS right away.     Interval History  - Pt upgraded to SDU over the weekend, palliative care will follow.    ADVANCE DIRECTIVES:    [ x] Full Code [ ] DNR  MOLST  [ ]  Living Will  [ ]   DECISION MAKER(s):  [x ] Health Care Proxy(s)  [ ] Surrogate(s)  [ ] Guardian           Name(s): Phone Number(s):      Answers: Emergency Contact Name Raine,  Answers: Emergency Contact Phone # [phone],  Answers: Emergency Contact Relationship wife    BASELINE (I)ADL(s) (prior to admission):  [place]: [ ]Total  [ ] Moderate [x ]Dependent      Allergies    ACE inhibitors (Rash)  Entresto (Swelling)  Atorvastatin Calcium (Unknown)  Bactrim (Unknown)  Plavix (Unknown)    Intolerances    MEDICATIONS  (STANDING):  aspirin enteric coated 81 milliGRAM(s) Oral daily  buMETAnide Injectable 2 milliGRAM(s) IV Push two times a day  cefepime   IVPB 2000 milliGRAM(s) IV Intermittent every 24 hours  chlorhexidine 2% Cloths 1 Application(s) Topical daily  dexMEDEtomidine Infusion 0.2 MICROgram(s)/kG/Hr (3.25 mL/Hr) IV Continuous <Continuous>  dextrose 5%. 1000 milliLiter(s) (50 mL/Hr) IV Continuous <Continuous>  dextrose 5%. 1000 milliLiter(s) (100 mL/Hr) IV Continuous <Continuous>  dextrose 50% Injectable 25 Gram(s) IV Push once  dextrose 50% Injectable 12.5 Gram(s) IV Push once  dextrose 50% Injectable 25 Gram(s) IV Push once  dextrose 50% Injectable 25 milliLiter(s) IV Push once  DULoxetine 30 milliGRAM(s) Oral <User Schedule>  fentaNYL   Infusion. 0.5 MICROgram(s)/kG/Hr (3.25 mL/Hr) IV Continuous <Continuous>  folic acid 1 milliGRAM(s) Oral <User Schedule>  glucagon  Injectable 1 milliGRAM(s) IntraMuscular once  heparin   Injectable 5000 Unit(s) SubCutaneous every 12 hours  hydrocortisone 2.5% Cream 1 Application(s) Topical two times a day  insulin lispro (ADMELOG) corrective regimen sliding scale   SubCutaneous three times a day before meals  lactulose Syrup 10 Gram(s) Oral at bedtime  levothyroxine 25 MICROGram(s) Oral <User Schedule>  levothyroxine 50 MICROGram(s) Oral <User Schedule>  magnesium sulfate  IVPB 2 Gram(s) IV Intermittent once  magnesium sulfate  IVPB 2 Gram(s) IV Intermittent once  methotrexate (Non - oncologic) 10 milliGRAM(s) Oral every week  metoprolol succinate ER 25 milliGRAM(s) Oral daily  midodrine. 10 milliGRAM(s) Oral three times a day  milrinone Infusion 0.125 MICROgram(s)/kG/Min (2.44 mL/Hr) IV Continuous <Continuous>  norepinephrine Infusion 0.05 MICROgram(s)/kG/Min (6.09 mL/Hr) IV Continuous <Continuous>  pantoprazole    Tablet 40 milliGRAM(s) Oral before breakfast  polyethylene glycol 3350 17 Gram(s) Oral two times a day  prasugrel 10 milliGRAM(s) Oral daily  senna 2 Tablet(s) Oral at bedtime  vancomycin  IVPB 500 milliGRAM(s) IV Intermittent every 8 hours    MEDICATIONS  (PRN):  acetaminophen     Tablet .. 325 milliGRAM(s) Oral every 4 hours PRN Moderate Pain (4 - 6)  dextrose Oral Gel 15 Gram(s) Oral once PRN Blood Glucose LESS THAN 70 milliGRAM(s)/deciliter  oxyCODONE    IR 5 milliGRAM(s) Oral every 6 hours PRN Pain 4-10    PRESENT SYMPTOMS: [ ]Unable to obtain due to poor mentation   Source if other than patient:  [ ]Family   [ ]Team     Pain: [ ]yes [ ]no  QOL impact -   Location -                    Aggravating factors -  Quality -  Radiation -  Timing-  Severity (0-10 scale):  Minimal acceptable level (0-10 scale):     CPOT:    https://[email or web address]/[email or web address]/Critical-Care-Pain-Observation-Tool-(CPOT)    PAIN AD Score:   [email or web address]/painad.pdf (press ctrl +  left click to view)    Dyspnea:                           [ ]None[ ]Mild [ ]Moderate [ ]Severe     Respiratory Distress Observation Scale (RDOS):   A score of 0 to 2 signifies little or no respiratory distress, 3 signifies mild distress, scores 4 to 6 indicate moderate distress, and scores greater than 7 signify severe distress  [email or web address]/[email or web address]/[email or web address]-adgrzzhglts-yhqhgmlf-jwiluovzdes-lcfze.[email or web address]    Anxiety:                             [ ]None[ ]Mild [ ]Moderate [ ]Severe   Fatigue:                             [ ]None[ ]Mild [ ]Moderate [ ]Severe   Nausea:                             [ ]None[ ]Mild [ ]Moderate [ ]Severe   Loss of appetite:              [ ]None[ ]Mild [ ]Moderate [ ]Severe   Constipation:                    [ ]None[ ]Mild [ ]Moderate [ ]Severe    Other Symptoms:  [ ]All other review of systems negative     Palliative Performance Status Version 2:         %    [email or web address]/[email or web address]_performance_scale_[email or web address].pdf  PHYSICAL EXAM:  Vital Signs Last 24 Hrs  T(C): 36.7 (05 Jun 2023 04:30), Max: 36.7 (05 Jun 2023 04:30)  T(F): 98 (05 Jun 2023 04:30), Max: 98 (05 Jun 2023 04:30)  HR: 98 (05 Jun 2023 04:30) (88 - 105)  BP: 107/55 (05 Jun 2023 04:30) (107/55 - 111/81)  BP(mean): --  RR: 20 (05 Jun 2023 04:30) (18 - 20)  SpO2: 100% (05 Jun 2023 04:30) (100% - 100%)     I&O's Summary      GENERAL:  [ ] No acute distress [ ]Lethargic  [ ]Unarousable  [ ]Verbal  [ ]Non-Verbal [ ]Cachexia    BEHAVIORAL/PSYCH:  [x ]Alert and Oriented x  1[ ] Anxiety [ ] Delirium [x ] Agitation [ ] Calm   EYES: [x ] No scleral icterus [ ] Scleral icterus [ ] Closed  ENMT:  [ ]Dry mouth  [ ]No external oral lesions [ ] No external ear or nose lesions  CARDIOVASCULAR:  [ ]Regular [ ]Irregular [ ]Tachy [ ]Not Tachy  [ ][place] [ ] Edema [ ] No edema  PULMONARY:  [ ]Tachypnea  [ ]Audible excessive secretions [ x] No labored breathing [ ] labored breathing  GASTROINTESTINAL: [ x]Soft  [ ]Distended  [ ]Not distended [ ]Non tender [ ]Tender  MUSCULOSKELETAL: [ ]No clubbing [ ] clubbing  [ ] No cyanosis [ ] cyanosis  NEUROLOGIC: [ ]No focal deficits  [ ]Follows commands  [ ]Does not follow commands  [x ]Cognitive impairment  [ ]Dysphagia  [ ]Dysarthria  [ ]Paresis   SKIN: [ ] Jaundiced [x ] Non-jaundiced [ ]Rash [ ]No Rash [ ] Warm [ x] Multiple small sores   MISC/LINES: [ ] ET tube [ ] Trach [ ]NGT/OGT [ ]PEG [ x]Loja    CRITICAL CARE:  [ x] Shock Present  [ ]Septic [ ]Cardiogenic [ ]Neurologic [ ]Hypovolemic  [x ]  Vasopressors [ ]  Inotropes   [ ]Respiratory failure present [ ]Mechanical ventilation [ ]Non-invasive ventilatory support [ ]High flow  [ ]Acute  [ ]Chronic [ ]Hypoxic  [ ]Hypercarbic [ ]Other  [ ]Other organ failure    LABS: reviewed by me                        11.0   11.35 )-----------( 235      ( 04 Jun 2023 08:38 )             37.5   06-05    142  |  100  |  51<H>  ----------------------------<  71  7.4<HH>   |  21  |  3.4<H>    Ca    9.4      05 Jun 2023 08:05  Mg     2.1     06-04    TPro  6.2  /  Alb  3.1<L>  /  TBili  2.8<H>  /  DBili  x   /  AST  122<H>  /  ALT  46<H>  /  AlkPhos  110  06-04        RADIOLOGY & ADDITIONAL STUDIES: reviewed by me    EKG: reviewed by me    REFERRALS:   [ ]Chaplaincy  [ ]Hospice  [ ]Child Life  [ x]Social Work  [ x]Case management [ ]Holistic Therapy     Patient discussed with primary medical team MD  Palliative care education provided to patient and/or family    Goals of Care Document:

## 2023-06-05 NOTE — PROGRESS NOTE ADULT - ASSESSMENT
Assessment: 67-year-old male w/ HFrEF 15-20% s/p ICD, DM type I, DL, HTN, CAD, hx MI, s/p CABG, s/p stent (2018), hx in-stent thrombosis while on Plavix, PAD s/p 3 LLE stents, TIAGO non-compliant w/ CPAP, R-AKA presenting to ED s/p fall (mechanical). Patient found to be fluid overloaded with MOISES, heart failure consulted for further management of ADHF.      Plan:  Transfer to CCU  Once transferred, place SWAN catheter- obtain hemodynamics   Patient currently intubated, sedated  Start milrinone @ 0.125 mcg/kg/min   Get BMP twice daily with magnesium   Maintain potassium >4.0, Mg >2.2  Strict intake and output  Daily weight   s/p IV iron sucrose 5 day course   Plan discussed with CEU team  Will continue to monitor

## 2023-06-05 NOTE — PROGRESS NOTE ADULT - ASSESSMENT
66 yo M PMHx chronic HFrEF 15-20% s/p ICD, decreased RV function, mild MR/TR, DM type I w/ neuropathy and hx hypoglycemia, DLD, HTN, CAD, hx MI, s/p CABG, s/p stent (2018), hx in-stent thrombosis while on Plavix, PAD s/p 3 LLE stents, TIAGO (needs CPAP auto-regulating pressure 10-16, patient doesn't use it due to claustrophobia), Psoriasis, R-AKA presenting to ED s/p fall. He states he was lying in bed at home when he fell asleep and fell off the bed. Found to have moises and mild elevation of trop, hospital course complicated by AMS, lactic acidosis and hypoxemia, requring tfer to CEU. In CEU, mental status continued to worsen, patient was intubated for airway protection 6/5 now pending transfer to MICU     Acute Metabolic encephalopathy  Suspected Cardiogenic Shock, septic less likely  HFrEF  Multiorgan failure  MOISES/Transaminitis   Lactic acidosis   -6/4 pt found minimally responsive  -BS -90, ABG - lactate 6.1, pO2-44  -Chest xray - fluid overloaded but no significantly different, BNP up from 9K to 31K  - intubated 6/5 for airway protection, sedated with Fentanyl and Precedex  - stat labs sent, c/w empiric abx for now  - place fields  - milrinone drip per cardio  - HF fu  - pending transfer to MICU    Hyperkalemia - improved    CAD s/p PCI  Type 2 NSTEMI due to demand ischemia/shock:   Troponin 0.09>0.10>0.2  Continue Aspirin, Prasugrel, Metoprolol. Hold statin due to transaminitis     Mechanical Fall   No CT evidence for acute traumatic injury within the chest, abdomen or pelvis    Iron deficiency anemia:   Iron studies reviewed Iron level 13, Iron sat 3%  Venofer 200mg IV daily for 5 days.      DM type 1 complicated by hypoglycemia:  episodes of hypoglycemia at home, insulin drip once in ICU     Hypothyroidism: continue Synthroid    Recent dx of RA  - would hold mtx given acute ill ness     OVerall prognosis is grave, all events discussed with patient's daughter, including intubation and transfer to MICU, family on the way   68 yo M PMHx chronic HFrEF 15-20% s/p ICD, decreased RV function, mild MR/TR, DM type I w/ neuropathy and hx hypoglycemia, DLD, HTN, CAD, hx MI, s/p CABG, s/p stent (2018), hx in-stent thrombosis while on Plavix, PAD s/p 3 LLE stents, TIAGO (needs CPAP auto-regulating pressure 10-16, patient doesn't use it due to claustrophobia), Psoriasis, R-AKA presenting to ED s/p fall. He states he was lying in bed at home when he fell asleep and fell off the bed. Found to have moises and mild elevation of trop, hospital course complicated by AMS, lactic acidosis and hypoxemia, requring tfer to CEU. In CEU, mental status continued to worsen, patient was intubated for airway protection 6/5 now pending transfer to MICU     Acute Metabolic encephalopathy  Suspected Cardiogenic Shock, septic less likely  HFrEF  Multiorgan failure  MOISES/Transaminitis   Lactic acidosis   -6/4 pt found minimally responsive  -BS -90, ABG - lactate 6.1, pO2-44  -Chest xray - fluid overloaded but no significantly different, BNP up from 9K to 31K  - intubated 6/5 for airway protection, sedated with Fentanyl and Precedex  - stat labs sent, c/w empiric abx for now  - place fields  - milrinone drip per cardio  - HF fu, ?swan  - pending transfer to CCU    Hyperkalemia - improved    CAD s/p PCI  Type 2 NSTEMI due to demand ischemia/shock:   Troponin 0.09>0.10>0.2  Continue Aspirin, Prasugrel, Metoprolol. Hold statin due to transaminitis     Mechanical Fall   No CT evidence for acute traumatic injury within the chest, abdomen or pelvis    Iron deficiency anemia:   Iron studies reviewed Iron level 13, Iron sat 3%  Venofer 200mg IV daily for 5 days.      DM type 1 complicated by hypoglycemia:  episodes of hypoglycemia at home, insulin drip once in ICU     Hypothyroidism: continue Synthroid    Recent dx of RA  - would hold mtx given acute ill ness     OVerall prognosis is grave, all events discussed with patient's daughter over the phone, as well as later on in person at bedside, including intubation and transfer to CCU,

## 2023-06-05 NOTE — AIRWAY PLACEMENT NOTE ADULT - POST AIRWAY PLACEMENT ASSESSMENT:
breath sounds bilateral/breath sounds equal/positive end tidal CO2 noted/CXR pending/chest excursion noted/skin color improved
breath sounds bilateral/breath sounds equal/positive end tidal CO2 noted/CXR pending/chest excursion noted/skin color improved

## 2023-06-05 NOTE — PROGRESS NOTE ADULT - SUBJECTIVE AND OBJECTIVE BOX
Date of Admission: 23    Interval History: Patient seen and examined in bed, now intubated/sedated. Patient with evidence of cardiogenic shock, milrinone gtt to be initiated, patient to be transferred to CCU for further management.    CHIEF COMPLAINT: Patient is a 67y old  Male who presents with a chief complaint of Mechanical fall (22 May 2023 12:56)    HISTORY OF PRESENT ILLNESS: 67-year-old male w/ HFrEF 15-20% s/p ICD, decreased RV function, mild MR/TR, DM type I w/ neuropathy and hx hypoglycemia , DL, HTN, CAD, hx MI, s/p CABG, s/p stent (), hx in-stent thrombosis while on Plavix, PAD s/p 3 LLE stents, TIAGO (needs CPAP auto-regulating pressure 10-, patient doesn't use it due to claustrophobia), Psoriasis, R-AKA presenting to ED s/p fall. He states he was lying in bed at home when he fell asleep and fell off the bed, and complains of generalized headache neck pain, non-radiating, no alleviating or aggravating factors, associated with right anterior chest wall pain. Denies LOC,  fevers, chills, nausea, vomiting, dizziness, abdominal pain, shortness of breath. States he had TDAP recently. Pt was not down for long as wife was upstairs when he fell off the bed and called EMS right away.   Labs significant for cr 1.8 (baseline ~ 1.2)  trop 0.10 (18 May 2023 14:16)      PAST MEDICAL & SURGICAL HISTORY:  Status post percutaneous transluminal coronary angioplasty  2 stents  Atherosclerosis of coronary artery  CAD (coronary artery disease)  Osteoarthritis  HLD (hyperlipidemia)  Diabetes  on insulin pump  CHF (congestive heart failure)  EF ~ 25%  History of celiac disease  Diverticulitis  STEMI (ST elevation myocardial infarction)  Diabetic neuropathy  Hands and Feet  Anxiety and depression  Other postprocedural status  Fixation hardware in foot LEFT  Stented coronary artery  10/18 heart attack  INFERIOR WALL MI  S/P CABG x 1    S/P TKR (total knee replacement), right  with infection Mrsa  per pt he was cleared from MRSA infection  Surgery, elective  right knee wound debridement  History of open reduction and internal fixation (ORIF) procedure  right hip  H/O shoulder surgery  right  AICD (automatic cardioverter/defibrillator) present  St Kali  Cholecystostomy care  drain inserted  & removed 4 months ago  History of tonsillectomy  History of hip replacement, total, right  Elective surgery  plastic surgery Left shin      FAMILY HISTORY: Patient was adopted, family history unknown to him  SOCIAL HISTORY:  Denies smoking, alcohol, or drug use      Allergies  ACE inhibitors (Rash)  Entresto (Swelling)  Atorvastatin Calcium (Unknown)  Bactrim (Unknown)  Plavix (Unknown)    Intolerances      PHYSICAL EXAM:  T(C): 36.7 (2023 04:30), Max: 36.7 (2023 04:30)  T(F): 98 (2023 04:30), Max: 98 (2023 04:30)  HR: 103 (2023 10:44) (88 - 113)  BP: 99/58 (2023 10:44) (99/58 - 111/81)  BP(mean): 75 (2023 10:44) (75 - 75)  RR: 18 (2023 10:44) (18 - 20)  SpO2: 100% (2023 10:44) (100% - 100%)      General Appearance: intubated/sedated	  Neck: JVP [id number]   Cardiovascular: regular rate and rhythm S1 S2, No edema  Respiratory: b/l breath sounds, MV  Psychiatry: sedated  Gastrointestinal:  Soft, +BS  Skin/Integumentary: No rashes, No ecchymoses, No cyanosis	  Musculoskeletal/ extremities: R AKA, No clubbing, cyanosis or edema  Vascular: Peripheral pulses palpable 2+ B/L UE         LABS:	 	    CBC Full  -  ( 2023 09:40 )  WBC Count : 8.45 K/uL  RBC Count : 4.92 M/uL  Hemoglobin : 10.6 g/dL  Hematocrit : 36.5 %  Platelet Count - Automated : 208 K/uL  Mean Cell Volume : 74.2 fL  Mean Cell Hemoglobin : 21.5 pg  Mean Cell Hemoglobin Concentration : 29.0 g/dL  Auto Neutrophil # : 8.08 K/uL  Auto Lymphocyte # : 0.14 K/uL  Auto Monocyte # : 0.08 K/uL  Auto Eosinophil # : 0.00 K/uL  Auto Basophil # : 0.00 K/uL  Auto Neutrophil % : 95.6 %  Auto Lymphocyte % : 1.7 %  Auto Monocyte % : 0.9 %  Auto Eosinophil % : 0.0 %  Auto Basophil % : 0.0 %      Basic Metabolic Panel (23 @ 08:05)    Sodium, Serum: 142 mmol/L   Potassium, Serum: 7.4: Hemolyzed. Interpret with caution  Critical value:  TYPE: HYPERCRITICAL RESULT  TESTS: K  DATE/TIME CALLED: 2023 09:00:24 EDT  CALLED TO: DR. DÍAZ  READ BACK (2 Patient Identifiers)(Y/N): Y  READ BACK VALUES (Y/N): Y  LICENSED STAFF AVAILABLE FOR IMMEDIATE TREATMENT (Y/N): Y  RAPID RESPONSE TEAM NOTIFIED (Y/N): N  RRT CALLED TO:N/A  CALLED BY: MJM mmol/L   Chloride, Serum: 100 mmol/L   Carbon Dioxide, Serum: 21 mmol/L   Anion Gap, Serum: 21 mmol/L   Blood Urea Nitrogen, Serum: 51 mg/dL   Creatinine, Serum: 3.4 mg/dL   Glucose, Serum: 71 mg/dL   Calcium, Total Serum: 9.4 mg/dL   eGFR: 19: The estimated glomerular filtration rate (eGFR) is calculated using the   CKD-EPI creatinine equation, which does not have a coefficient for  race and is validated in individuals 18 years of age and older (N Engl J  Med ; 385:9941-1800). Creatinine-based eGFR may be inaccurate in  various situations including but not limited to extremes of muscle mass,  altered dietary protein intake, or medications that affect renal tubular  creatinine secretion. mL/min/1.73m2        CARDIAC MARKERS:  Pro-Brain Natriuretic Peptide: 9132 pg/mL (23 @ 07:01)        TELEMETRY EVENTS: 	    EC23    Ventricular Rate 79 BPM  Atrial Rate 79 BPM  P-R Interval 170 ms  QRS Duration 158 ms  Q-T Interval 480 ms  QTC Calculation(Bazett) 550 ms  P Axis 51 degrees  R Axis -35 degrees  T Axis 65 degrees    Diagnosis Line Atrial-sensed ventricular-paced rhythm  Abnormal ECG    Confirmed by London Santos ([id number]) on 2023 4:12:01 PM      PREVIOUS DIAGNOSTIC TESTING:    TTE 23    Summary:   1. Left ventricular ejection fraction, by visual estimation, is <20%.   2. Severely decreased global left ventricular systolic function.   3. Mildly increased LV wall thickness.   4. Severe concentric left ventricular hypertrophy.   5. Spectral Doppler shows restrictive pattern of left ventricular   myocardial filling (Grade III diastolic dysfunction).   6. Moderately enlarged right ventricle.   7. Moderately reduced RV systolic function.   8. Elevated mean left atrial pressure.   9. Moderately enlarged left atrium.  10.Moderate mitral valve regurgitation.  11. The mitral valve leaflets are tethered which is due to reduced   systolic function and elevated LVDP.  12. Moderate-severe tricuspid regurgitation.  13. Estimated pulmonary artery systolic pressure is 48.4 mmHg assuming a   right atrial pressure of 8 mmHg, which is consistent with mild pulmonary   hypertension.  14. Patient is in normal sinus rhythm.  15. Compared to the prior TTE dated 21, the patient's cardiomyopathy   has worsened.      Left Heart Catheterization: 18    IMPRESSIONS: There is significant single vessel native coronary artery  disease. Patent LIMA to LAD. No significant restenosis in RCA/OM1.    	    Home Medications:  aspirin 81 mg oral delayed release tablet: 1 tab(s) orally once a day (2023 02:35)  diazePAM 2 mg oral tablet: 0.5 tab(s) orally once a day (at bedtime) (2023 02:35)  DULoxetine 30 mg oral delayed release capsule: 1 cap(s) orally 3 times a day (2023 02:35)  insulin glargine 100 units/mL subcutaneous solution: 25 unit(s) subcutaneous once a day (at bedtime) (2023 02:35)  insulin glargine 100 units/mL subcutaneous solution: 40 microcurie subcutaneous once a day (2023 02:35)  insulin lispro 100 units/mL injectable solution: if your finger stick is 150-199 please inject 2 units  if your finger stick is 200-250 please inject 4 units  if your finger stick is 251-300 please inject 6 units  if your finger stick is 301-350 please inject 8 units  if your finger stick is more than 350 please call your physician    (2023 02:35)  levothyroxine 25 mcg (0.025 mg) oral tablet: 1 tab(s) orally 6 times a week (2023 02:35)  levothyroxine 50 mcg (0.05 mg) oral tablet: 1 tab(s) orally once a week (2023 02:35)  metoprolol succinate 25 mg oral tablet, extended release: 1 tab(s) orally once a day (2023 02:35)  Multiple Vitamins oral tablet: 1 tab(s) orally once a day (2023 02:35)  pantoprazole 40 mg oral delayed release tablet: 1 tab(s) orally once a day (before a meal) (2023 02:35)  prasugrel 10 mg oral tablet: 1 tab(s) orally once a day (2023 02:35)  rosuvastatin 40 mg oral tablet: 1 tab(s) orally once a day (2023 02:35)  spironolactone 25 mg oral tablet: 1 tab(s) orally once a day (2023 02:35)        MEDICATIONS  (STANDING):  aspirin enteric coated 81 milliGRAM(s) Oral daily  dextrose 5% + sodium chloride 0.9%. 1000 milliLiter(s) (50 mL/Hr) IV Continuous <Continuous>  dextrose 5%. 1000 milliLiter(s) (100 mL/Hr) IV Continuous <Continuous>  dextrose 5%. 1000 milliLiter(s) (50 mL/Hr) IV Continuous <Continuous>  dextrose 50% Injectable 25 Gram(s) IV Push once  dextrose 50% Injectable 12.5 Gram(s) IV Push once  dextrose 50% Injectable 25 Gram(s) IV Push once  diazepam    Tablet 1 milliGRAM(s) Oral at bedtime  DULoxetine 30 milliGRAM(s) Oral <User Schedule>  furosemide   Injectable 20 milliGRAM(s) IV Push two times a day  glucagon  Injectable 1 milliGRAM(s) IntraMuscular once  heparin   Injectable 5000 Unit(s) SubCutaneous every 12 hours  hydrocortisone 2.5% Cream 1 Application(s) Topical two times a day  insulin glargine Injectable (LANTUS) 12 Unit(s) SubCutaneous at bedtime  insulin lispro (ADMELOG) corrective regimen sliding scale   SubCutaneous three times a day before meals  lactulose Syrup 10 Gram(s) Oral at bedtime  levothyroxine 25 MICROGram(s) Oral <User Schedule>  levothyroxine 50 MICROGram(s) Oral <User Schedule>  metoprolol succinate ER 25 milliGRAM(s) Oral daily  midodrine. 10 milliGRAM(s) Oral three times a day  pantoprazole    Tablet 40 milliGRAM(s) Oral before breakfast  polyethylene glycol 3350 17 Gram(s) Oral two times a day  prasugrel 10 milliGRAM(s) Oral daily  senna 2 Tablet(s) Oral at bedtime    MEDICATIONS  (PRN):  dextrose Oral Gel 15 Gram(s) Oral once PRN Blood Glucose LESS THAN 70 milliGRAM(s)/deciliter         Date of Admission: 23    Interval History: Patient seen and examined in bed, now intubated/sedated. Patient with evidence of cardiogenic shock (lactate elevated to 6.3)- milrinone gtt to be initiated, patient to be transferred to CCU with SWAN placement for further management.    CHIEF COMPLAINT: Patient is a 67y old  Male who presents with a chief complaint of Mechanical fall (22 May 2023 12:56)    HISTORY OF PRESENT ILLNESS: 67-year-old male w/ HFrEF 15-20% s/p ICD, decreased RV function, mild MR/TR, DM type I w/ neuropathy and hx hypoglycemia , DL, HTN, CAD, hx MI, s/p CABG, s/p stent (2018), hx in-stent thrombosis while on Plavix, PAD s/p 3 LLE stents, TIAGO (needs CPAP auto-regulating pressure 10-16, patient doesn't use it due to claustrophobia), Psoriasis, R-AKA presenting to ED s/p fall. He states he was lying in bed at home when he fell asleep and fell off the bed, and complains of generalized headache neck pain, non-radiating, no alleviating or aggravating factors, associated with right anterior chest wall pain. Denies LOC,  fevers, chills, nausea, vomiting, dizziness, abdominal pain, shortness of breath. States he had TDAP recently. Pt was not down for long as wife was upstairs when he fell off the bed and called EMS right away.   Labs significant for cr 1.8 (baseline ~ 1.2)  trop 0.10 (18 May 2023 14:16)      PAST MEDICAL & SURGICAL HISTORY:  Status post percutaneous transluminal coronary angioplasty  2 stents  Atherosclerosis of coronary artery  CAD (coronary artery disease)  Osteoarthritis  HLD (hyperlipidemia)  Diabetes  on insulin pump  CHF (congestive heart failure)  EF ~ 25%  History of celiac disease  Diverticulitis  STEMI (ST elevation myocardial infarction)  Diabetic neuropathy  Hands and Feet  Anxiety and depression  Other postprocedural status  Fixation hardware in foot LEFT  Stented coronary artery  10/18 heart attack  INFERIOR WALL MI  S/P CABG x 1    S/P TKR (total knee replacement), right  with infection Mrsa  per pt he was cleared from MRSA infection  Surgery, elective  right knee wound debridement  History of open reduction and internal fixation (ORIF) procedure  right hip  H/O shoulder surgery  right  AICD (automatic cardioverter/defibrillator) present  St Kali  Cholecystostomy care  drain inserted  & removed 4 months ago  History of tonsillectomy  History of hip replacement, total, right  Elective surgery  plastic surgery Left shin      FAMILY HISTORY: Patient was adopted, family history unknown to him  SOCIAL HISTORY:  Denies smoking, alcohol, or drug use      Allergies  ACE inhibitors (Rash)  Entresto (Swelling)  Atorvastatin Calcium (Unknown)  Bactrim (Unknown)  Plavix (Unknown)    Intolerances      PHYSICAL EXAM:  T(C): 36.7 (2023 04:30), Max: 36.7 (2023 04:30)  T(F): 98 (2023 04:30), Max: 98 (2023 04:30)  HR: 103 (2023 10:44) (88 - 113)  BP: 99/58 (2023 10:44) (99/58 - 111/81)  BP(mean): 75 (2023 10:44) (75 - 75)  RR: 18 (2023 10:44) (18 - 20)  SpO2: 100% (2023 10:44) (100% - 100%)      General Appearance: intubated/sedated	  Neck: JVP [id number]   Cardiovascular: regular rate and rhythm S1 S2, No edema  Respiratory: b/l breath sounds, MV  Psychiatry: sedated  Gastrointestinal:  Soft, +BS  Skin/Integumentary: No rashes, No ecchymoses, No cyanosis	  Musculoskeletal/ extremities: R AKA, No clubbing, cyanosis or edema  Vascular: Peripheral pulses palpable 2+ B/L UE         LABS:	 	    CBC Full  -  ( 2023 09:40 )  WBC Count : 8.45 K/uL  RBC Count : 4.92 M/uL  Hemoglobin : 10.6 g/dL  Hematocrit : 36.5 %  Platelet Count - Automated : 208 K/uL  Mean Cell Volume : 74.2 fL  Mean Cell Hemoglobin : 21.5 pg  Mean Cell Hemoglobin Concentration : 29.0 g/dL  Auto Neutrophil # : 8.08 K/uL  Auto Lymphocyte # : 0.14 K/uL  Auto Monocyte # : 0.08 K/uL  Auto Eosinophil # : 0.00 K/uL  Auto Basophil # : 0.00 K/uL  Auto Neutrophil % : 95.6 %  Auto Lymphocyte % : 1.7 %  Auto Monocyte % : 0.9 %  Auto Eosinophil % : 0.0 %  Auto Basophil % : 0.0 %      Basic Metabolic Panel (23 @ 08:05)    Sodium, Serum: 142 mmol/L   Potassium, Serum: 7.4: Hemolyzed. Interpret with caution  Critical value:  TYPE: HYPERCRITICAL RESULT  TESTS: K  DATE/TIME CALLED: 2023 09:00:24 EDT  CALLED TO: DR. DÍAZ  READ BACK (2 Patient Identifiers)(Y/N): Y  READ BACK VALUES (Y/N): Y  LICENSED STAFF AVAILABLE FOR IMMEDIATE TREATMENT (Y/N): Y  RAPID RESPONSE TEAM NOTIFIED (Y/N): N  RRT CALLED TO:N/A  CALLED BY: MJM mmol/L   Chloride, Serum: 100 mmol/L   Carbon Dioxide, Serum: 21 mmol/L   Anion Gap, Serum: 21 mmol/L   Blood Urea Nitrogen, Serum: 51 mg/dL   Creatinine, Serum: 3.4 mg/dL   Glucose, Serum: 71 mg/dL   Calcium, Total Serum: 9.4 mg/dL   eGFR: 19: The estimated glomerular filtration rate (eGFR) is calculated using the   CKD-EPI creatinine equation, which does not have a coefficient for  race and is validated in individuals 18 years of age and older (N Engl J  Med ; 385:9968-4952). Creatinine-based eGFR may be inaccurate in  various situations including but not limited to extremes of muscle mass,  altered dietary protein intake, or medications that affect renal tubular  creatinine secretion. mL/min/1.73m2        CARDIAC MARKERS:  Pro-Brain Natriuretic Peptide: 9132 pg/mL (23 @ 07:01)        TELEMETRY EVENTS: 	    EC23    Ventricular Rate 79 BPM  Atrial Rate 79 BPM  P-R Interval 170 ms  QRS Duration 158 ms  Q-T Interval 480 ms  QTC Calculation(Bazett) 550 ms  P Axis 51 degrees  R Axis -35 degrees  T Axis 65 degrees    Diagnosis Line Atrial-sensed ventricular-paced rhythm  Abnormal ECG    Confirmed by London Santos ([id number]) on 2023 4:12:01 PM      PREVIOUS DIAGNOSTIC TESTING:    TTE 23    Summary:   1. Left ventricular ejection fraction, by visual estimation, is <20%.   2. Severely decreased global left ventricular systolic function.   3. Mildly increased LV wall thickness.   4. Severe concentric left ventricular hypertrophy.   5. Spectral Doppler shows restrictive pattern of left ventricular   myocardial filling (Grade III diastolic dysfunction).   6. Moderately enlarged right ventricle.   7. Moderately reduced RV systolic function.   8. Elevated mean left atrial pressure.   9. Moderately enlarged left atrium.  10.Moderate mitral valve regurgitation.  11. The mitral valve leaflets are tethered which is due to reduced   systolic function and elevated LVDP.  12. Moderate-severe tricuspid regurgitation.  13. Estimated pulmonary artery systolic pressure is 48.4 mmHg assuming a   right atrial pressure of 8 mmHg, which is consistent with mild pulmonary   hypertension.  14. Patient is in normal sinus rhythm.  15. Compared to the prior TTE dated 21, the patient's cardiomyopathy   has worsened.      Left Heart Catheterization: 18    IMPRESSIONS: There is significant single vessel native coronary artery  disease. Patent LIMA to LAD. No significant restenosis in RCA/OM1.    	    Home Medications:  aspirin 81 mg oral delayed release tablet: 1 tab(s) orally once a day (2023 02:35)  diazePAM 2 mg oral tablet: 0.5 tab(s) orally once a day (at bedtime) (2023 02:35)  DULoxetine 30 mg oral delayed release capsule: 1 cap(s) orally 3 times a day (2023 02:35)  insulin glargine 100 units/mL subcutaneous solution: 25 unit(s) subcutaneous once a day (at bedtime) (2023 02:35)  insulin glargine 100 units/mL subcutaneous solution: 40 microcurie subcutaneous once a day (2023 02:35)  insulin lispro 100 units/mL injectable solution: if your finger stick is 150-199 please inject 2 units  if your finger stick is 200-250 please inject 4 units  if your finger stick is 251-300 please inject 6 units  if your finger stick is 301-350 please inject 8 units  if your finger stick is more than 350 please call your physician    (2023 02:35)  levothyroxine 25 mcg (0.025 mg) oral tablet: 1 tab(s) orally 6 times a week (2023 02:35)  levothyroxine 50 mcg (0.05 mg) oral tablet: 1 tab(s) orally once a week (2023 02:35)  metoprolol succinate 25 mg oral tablet, extended release: 1 tab(s) orally once a day (2023 02:35)  Multiple Vitamins oral tablet: 1 tab(s) orally once a day (2023 02:35)  pantoprazole 40 mg oral delayed release tablet: 1 tab(s) orally once a day (before a meal) (2023 02:35)  prasugrel 10 mg oral tablet: 1 tab(s) orally once a day (2023 02:35)  rosuvastatin 40 mg oral tablet: 1 tab(s) orally once a day (2023 02:35)  spironolactone 25 mg oral tablet: 1 tab(s) orally once a day (2023 02:35)        MEDICATIONS  (STANDING):  aspirin enteric coated 81 milliGRAM(s) Oral daily  dextrose 5% + sodium chloride 0.9%. 1000 milliLiter(s) (50 mL/Hr) IV Continuous <Continuous>  dextrose 5%. 1000 milliLiter(s) (100 mL/Hr) IV Continuous <Continuous>  dextrose 5%. 1000 milliLiter(s) (50 mL/Hr) IV Continuous <Continuous>  dextrose 50% Injectable 25 Gram(s) IV Push once  dextrose 50% Injectable 12.5 Gram(s) IV Push once  dextrose 50% Injectable 25 Gram(s) IV Push once  diazepam    Tablet 1 milliGRAM(s) Oral at bedtime  DULoxetine 30 milliGRAM(s) Oral <User Schedule>  furosemide   Injectable 20 milliGRAM(s) IV Push two times a day  glucagon  Injectable 1 milliGRAM(s) IntraMuscular once  heparin   Injectable 5000 Unit(s) SubCutaneous every 12 hours  hydrocortisone 2.5% Cream 1 Application(s) Topical two times a day  insulin glargine Injectable (LANTUS) 12 Unit(s) SubCutaneous at bedtime  insulin lispro (ADMELOG) corrective regimen sliding scale   SubCutaneous three times a day before meals  lactulose Syrup 10 Gram(s) Oral at bedtime  levothyroxine 25 MICROGram(s) Oral <User Schedule>  levothyroxine 50 MICROGram(s) Oral <User Schedule>  metoprolol succinate ER 25 milliGRAM(s) Oral daily  midodrine. 10 milliGRAM(s) Oral three times a day  pantoprazole    Tablet 40 milliGRAM(s) Oral before breakfast  polyethylene glycol 3350 17 Gram(s) Oral two times a day  prasugrel 10 milliGRAM(s) Oral daily  senna 2 Tablet(s) Oral at bedtime    MEDICATIONS  (PRN):  dextrose Oral Gel 15 Gram(s) Oral once PRN Blood Glucose LESS THAN 70 milliGRAM(s)/deciliter

## 2023-06-05 NOTE — PROGRESS NOTE ADULT - SUBJECTIVE AND OBJECTIVE BOX
LOIDA FLOWER  67y Male    CHIEF COMPLAINT:    Patient is a 67y old  Male who presents with a chief complaint of Mechanical fall (05 Jun 2023 10:05)    INTERVAL HPI/OVERNIGHT EVENTS:    Patient seen and examined. upgraded to SDu overnight, this morning extremely agitated, not alert, on NRB, decision made to intubate, pending transfer to MICU     ROS: All other systems are negative.    Vital Signs:    T(F): 98 (06-05-23 @ 04:30), Max: 98 (06-05-23 @ 04:30)  HR: 103 (06-05-23 @ 10:44) (88 - 113)  BP: 99/58 (06-05-23 @ 10:44) (99/58 - 111/81)  RR: 18 (06-05-23 @ 10:44) (18 - 20)  SpO2: 100% (06-05-23 @ 10:44) (100% - 100%)    POCT Blood Glucose.: 66 mg/dL (05 Jun 2023 11:03)  POCT Blood Glucose.: 66 mg/dL (05 Jun 2023 09:15)  POCT Blood Glucose.: 84 mg/dL (04 Jun 2023 21:09)  POCT Blood Glucose.: 77 mg/dL (04 Jun 2023 17:01)    PHYSICAL EXAM:    GENERAL: Agitated, chronically ill appearing   SKIN: No rashes or lesions  HEENT: Atraumatic. Normocephalic.   NECK: Supple, No JVD.   PULMONARY: decreased breath sounds  B/L. No wheezing   CVS: Normal S1, S2. Rate and Rhythm are regular. tachycardic  ABDOMEN/GI: Soft, Nontender, Nondistended   MSK:  No edema B/L LE. No clubbing or cyanosis, R AKA  NEUROLOGIC:  spontaneosuly moves all extremities   PSYCH: agitated, not alert     Consultant(s) Notes Reviewed:  [x ] YES  [ ] NO  Care Discussed with Consultants/Other Providers [ x] YES  [ ] NO    LABS:                        11.0   11.35 )-----------( 235      ( 04 Jun 2023 08:38 )             37.5     147<H>  |  100  |  54<H>  ----------------------------<  210<H>  5.0   |  29  |  3.6<H>    Ca    9.1      05 Jun 2023 09:40  Mg     2.1     06-04    TPro  6.2  /  Alb  3.3<L>  /  TBili  3.1<H>  /  DBili  x   /  AST  152<H>  /  ALT  78<H>  /  AlkPhos  122<H>  06-05    PT/INR - ( 05 Jun 2023 09:40 )   PT: 24.90 sec;   INR: 2.13 ratio       PTT - ( 05 Jun 2023 09:40 )  PTT:28.1 sec    RADIOLOGY & ADDITIONAL TESTS:  Imaging or report Personally Reviewed:  [x] YES  [ ] NO  EKG reviewed: [x] YES  [ ] NO    Medications:  Standing  aspirin enteric coated 81 milliGRAM(s) Oral daily  buMETAnide Injectable 2 milliGRAM(s) IV Push two times a day  cefepime   IVPB 2000 milliGRAM(s) IV Intermittent every 24 hours  chlorhexidine 2% Cloths 1 Application(s) Topical daily  dexMEDEtomidine Infusion 0.2 MICROgram(s)/kG/Hr IV Continuous <Continuous>  dextrose 5%. 1000 milliLiter(s) IV Continuous <Continuous>  dextrose 5%. 1000 milliLiter(s) IV Continuous <Continuous>  dextrose 50% Injectable 25 Gram(s) IV Push once  dextrose 50% Injectable 12.5 Gram(s) IV Push once  dextrose 50% Injectable 25 milliLiter(s) IV Push once  dextrose 50% Injectable 25 Gram(s) IV Push once  DULoxetine 30 milliGRAM(s) Oral <User Schedule>  fentaNYL   Infusion. 0.5 MICROgram(s)/kG/Hr IV Continuous <Continuous>  folic acid 1 milliGRAM(s) Oral <User Schedule>  glucagon  Injectable 1 milliGRAM(s) IntraMuscular once  heparin   Injectable 5000 Unit(s) SubCutaneous every 12 hours  hydrocortisone 2.5% Cream 1 Application(s) Topical two times a day  insulin lispro (ADMELOG) corrective regimen sliding scale   SubCutaneous three times a day before meals  lactulose Syrup 10 Gram(s) Oral at bedtime  levothyroxine 25 MICROGram(s) Oral <User Schedule>  levothyroxine 50 MICROGram(s) Oral <User Schedule>  magnesium sulfate  IVPB 2 Gram(s) IV Intermittent once  magnesium sulfate  IVPB 2 Gram(s) IV Intermittent once  methotrexate (Non - oncologic) 10 milliGRAM(s) Oral every week  metoprolol succinate ER 25 milliGRAM(s) Oral daily  midodrine. 10 milliGRAM(s) Oral three times a day  milrinone Infusion 0.125 MICROgram(s)/kG/Min IV Continuous <Continuous>  norepinephrine Infusion 0.05 MICROgram(s)/kG/Min IV Continuous <Continuous>  pantoprazole    Tablet 40 milliGRAM(s) Oral before breakfast  polyethylene glycol 3350 17 Gram(s) Oral two times a day  prasugrel 10 milliGRAM(s) Oral daily  senna 2 Tablet(s) Oral at bedtime  vancomycin  IVPB 500 milliGRAM(s) IV Intermittent every 8 hours    PRN Meds  acetaminophen     Tablet .. 325 milliGRAM(s) Oral every 4 hours PRN  dextrose Oral Gel 15 Gram(s) Oral once PRN  oxyCODONE    IR 5 milliGRAM(s) Oral every 6 hours PRN             LOIDA FLOWER  67y Male    CHIEF COMPLAINT:    Patient is a 67y old  Male who presents with a chief complaint of Mechanical fall (05 Jun 2023 10:05)    INTERVAL HPI/OVERNIGHT EVENTS:    Patient seen and examined. upgraded to SDu overnight, this morning extremely agitated, not alert, on NRB, decision made to intubate, pending transfer to ccu    ROS: All other systems are negative.    Vital Signs:    T(F): 98 (06-05-23 @ 04:30), Max: 98 (06-05-23 @ 04:30)  HR: 103 (06-05-23 @ 10:44) (88 - 113)  BP: 99/58 (06-05-23 @ 10:44) (99/58 - 111/81)  RR: 18 (06-05-23 @ 10:44) (18 - 20)  SpO2: 100% (06-05-23 @ 10:44) (100% - 100%)    POCT Blood Glucose.: 66 mg/dL (05 Jun 2023 11:03)  POCT Blood Glucose.: 66 mg/dL (05 Jun 2023 09:15)  POCT Blood Glucose.: 84 mg/dL (04 Jun 2023 21:09)  POCT Blood Glucose.: 77 mg/dL (04 Jun 2023 17:01)    PHYSICAL EXAM:    GENERAL: Agitated, chronically ill appearing   SKIN: No rashes or lesions  HEENT: Atraumatic. Normocephalic.   NECK: Supple, No JVD.   PULMONARY: decreased breath sounds  B/L. No wheezing   CVS: Normal S1, S2. Rate and Rhythm are regular. tachycardic  ABDOMEN/GI: Soft, Nontender, Nondistended   MSK:  No edema B/L LE. No clubbing or cyanosis, R AKA  NEUROLOGIC:  spontaneosuly moves all extremities   PSYCH: agitated, not alert     Consultant(s) Notes Reviewed:  [x ] YES  [ ] NO  Care Discussed with Consultants/Other Providers [ x] YES  [ ] NO    LABS:                        11.0   11.35 )-----------( 235      ( 04 Jun 2023 08:38 )             37.5     147<H>  |  100  |  54<H>  ----------------------------<  210<H>  5.0   |  29  |  3.6<H>    Ca    9.1      05 Jun 2023 09:40  Mg     2.1     06-04    TPro  6.2  /  Alb  3.3<L>  /  TBili  3.1<H>  /  DBili  x   /  AST  152<H>  /  ALT  78<H>  /  AlkPhos  122<H>  06-05    PT/INR - ( 05 Jun 2023 09:40 )   PT: 24.90 sec;   INR: 2.13 ratio       PTT - ( 05 Jun 2023 09:40 )  PTT:28.1 sec    RADIOLOGY & ADDITIONAL TESTS:  Imaging or report Personally Reviewed:  [x] YES  [ ] NO  EKG reviewed: [x] YES  [ ] NO    Medications:  Standing  aspirin enteric coated 81 milliGRAM(s) Oral daily  buMETAnide Injectable 2 milliGRAM(s) IV Push two times a day  cefepime   IVPB 2000 milliGRAM(s) IV Intermittent every 24 hours  chlorhexidine 2% Cloths 1 Application(s) Topical daily  dexMEDEtomidine Infusion 0.2 MICROgram(s)/kG/Hr IV Continuous <Continuous>  dextrose 5%. 1000 milliLiter(s) IV Continuous <Continuous>  dextrose 5%. 1000 milliLiter(s) IV Continuous <Continuous>  dextrose 50% Injectable 25 Gram(s) IV Push once  dextrose 50% Injectable 12.5 Gram(s) IV Push once  dextrose 50% Injectable 25 milliLiter(s) IV Push once  dextrose 50% Injectable 25 Gram(s) IV Push once  DULoxetine 30 milliGRAM(s) Oral <User Schedule>  fentaNYL   Infusion. 0.5 MICROgram(s)/kG/Hr IV Continuous <Continuous>  folic acid 1 milliGRAM(s) Oral <User Schedule>  glucagon  Injectable 1 milliGRAM(s) IntraMuscular once  heparin   Injectable 5000 Unit(s) SubCutaneous every 12 hours  hydrocortisone 2.5% Cream 1 Application(s) Topical two times a day  insulin lispro (ADMELOG) corrective regimen sliding scale   SubCutaneous three times a day before meals  lactulose Syrup 10 Gram(s) Oral at bedtime  levothyroxine 25 MICROGram(s) Oral <User Schedule>  levothyroxine 50 MICROGram(s) Oral <User Schedule>  magnesium sulfate  IVPB 2 Gram(s) IV Intermittent once  magnesium sulfate  IVPB 2 Gram(s) IV Intermittent once  methotrexate (Non - oncologic) 10 milliGRAM(s) Oral every week  metoprolol succinate ER 25 milliGRAM(s) Oral daily  midodrine. 10 milliGRAM(s) Oral three times a day  milrinone Infusion 0.125 MICROgram(s)/kG/Min IV Continuous <Continuous>  norepinephrine Infusion 0.05 MICROgram(s)/kG/Min IV Continuous <Continuous>  pantoprazole    Tablet 40 milliGRAM(s) Oral before breakfast  polyethylene glycol 3350 17 Gram(s) Oral two times a day  prasugrel 10 milliGRAM(s) Oral daily  senna 2 Tablet(s) Oral at bedtime  vancomycin  IVPB 500 milliGRAM(s) IV Intermittent every 8 hours    PRN Meds  acetaminophen     Tablet .. 325 milliGRAM(s) Oral every 4 hours PRN  dextrose Oral Gel 15 Gram(s) Oral once PRN  oxyCODONE    IR 5 milliGRAM(s) Oral every 6 hours PRN

## 2023-06-05 NOTE — CONSULT NOTE ADULT - ASSESSMENT
ASSESSMENT  67-year-old male w/ HFrEF 15-20% s/p ICD, decreased RV function, mild MR/TR, DM type I w/ neuropathy and hx hypoglycemia , DL, HTN, CAD, hx MI, s/p CABG, s/p stent (2018), hx in-stent thrombosis while on Plavix, PAD s/p 3 LLE stents, TIAGO (needs CPAP auto-regulating pressure 10-16, patient doesn't use it due to claustrophobia), Psoriasis, R-AKA presenting to ED s/p fall    IMPRESSION  #s/p Fall  #Shock - septic vs cardiogenic    #HFrEF s/p ICD   #CAD s/p CABG   #PAD s/p stent   #hx of Right AKA     #Abx allergy: Entresto (Swelling)  Atorvastatin Calcium (Unknown)  Bactrim (Unknown)  Plavix (Unknown)      RECOMMENDATIONS  - received vancomycin -- hold on further dosing and check levels with AM as with worsening creatinine  - continue cefepime 2g q 24 hours   - fields placed in ICU -- follow-up urine Cx   - check Renal US to ensure no worsening of previously seen left renal pelvic dilation   - follow-up blood cx  - poor prognosis overall     Please call or message on [employer] Teams if with any questions.  Spectra [phone]

## 2023-06-05 NOTE — PROGRESS NOTE ADULT - SUBJECTIVE AND OBJECTIVE BOX
Nephrology Progress Note    FLOWER MARISCAL  MRN-[id number]  67y  Male    S:  Patient is seen and examined, events over the last 24h noted.    O:  Allergies:  ACE inhibitors (Rash)  Entresto (Swelling)  Atorvastatin Calcium (Unknown)  Bactrim (Unknown)  Plavix (Unknown)    Hospital Medications:   MEDICATIONS  (STANDING):  aspirin enteric coated 81 milliGRAM(s) Oral daily  buMETAnide Injectable 2 milliGRAM(s) IV Push two times a day  cefepime   IVPB 2000 milliGRAM(s) IV Intermittent every 24 hours  chlorhexidine 0.12% Liquid 15 milliLiter(s) Oral Mucosa every 12 hours  chlorhexidine 2% Cloths 1 Application(s) Topical daily  dexMEDEtomidine Infusion 0.2 MICROgram(s)/kG/Hr (3.25 mL/Hr) IV Continuous <Continuous>  DULoxetine 30 milliGRAM(s) Oral <User Schedule>  fentaNYL   Infusion. 0.5 MICROgram(s)/kG/Hr (3.25 mL/Hr) IV Continuous <Continuous>  folic acid 1 milliGRAM(s) Oral <User Schedule>  heparin   Injectable 5000 Unit(s) SubCutaneous every 12 hours  hydrocortisone 2.5% Cream 1 Application(s) Topical two times a day  insulin lispro (ADMELOG) corrective regimen sliding scale   SubCutaneous three times a day before meals  lactulose Syrup 10 Gram(s) Oral at bedtime  levothyroxine 25 MICROGram(s) Oral <User Schedule>  levothyroxine 50 MICROGram(s) Oral <User Schedule>  magnesium sulfate  IVPB 2 Gram(s) IV Intermittent once  methotrexate (Non - oncologic) 10 milliGRAM(s) Oral every week  metoprolol succinate ER 25 milliGRAM(s) Oral daily  midodrine. 10 milliGRAM(s) Oral three times a day  milrinone Infusion 0.125 MICROgram(s)/kG/Min (2.44 mL/Hr) IV Continuous <Continuous>  norepinephrine Infusion 0.05 MICROgram(s)/kG/Min (6.09 mL/Hr) IV Continuous <Continuous>  pantoprazole    Tablet 40 milliGRAM(s) Oral before breakfast  polyethylene glycol 3350 17 Gram(s) Oral two times a day  prasugrel 10 milliGRAM(s) Oral daily  senna 2 Tablet(s) Oral at bedtime  vancomycin  IVPB 500 milliGRAM(s) IV Intermittent every 8 hours    MEDICATIONS  (PRN):  acetaminophen     Tablet .. 325 milliGRAM(s) Oral every 4 hours PRN Moderate Pain (4 - 6)  dextrose Oral Gel 15 Gram(s) Oral once PRN Blood Glucose LESS THAN 70 milliGRAM(s)/deciliter  oxyCODONE    IR 5 milliGRAM(s) Oral every 6 hours PRN Pain 4-10    Home Medications:  aspirin 81 mg oral delayed release tablet: 1 tab(s) orally once a day (2023 02:35)  diazePAM 2 mg oral tablet: 0.5 tab(s) orally once a day (at bedtime) (2023 02:35)  DULoxetine 30 mg oral delayed release capsule: 1 cap(s) orally 3 times a day (2023 02:35)  insulin glargine 100 units/mL subcutaneous solution: 25 unit(s) subcutaneous once a day (at bedtime) (2023 02:35)  insulin glargine 100 units/mL subcutaneous solution: 40 microcurie subcutaneous once a day (2023 02:35)  insulin lispro 100 units/mL injectable solution: if your finger stick is 150-199 please inject 2 units  if your finger stick is 200-250 please inject 4 units  if your finger stick is 251-300 please inject 6 units  if your finger stick is 301-350 please inject 8 units  if your finger stick is more than 350 please call your physician    (2023 02:35)  levothyroxine 25 mcg (0.025 mg) oral tablet: 1 tab(s) orally 6 times a week (2023 02:35)  levothyroxine 50 mcg (0.05 mg) oral tablet: 1 tab(s) orally once a week (2023 02:35)  metoprolol succinate 25 mg oral tablet, extended release: 1 tab(s) orally once a day (2023 02:35)  midodrine 5 mg oral tablet: 1 tab(s) orally 2 times a day (2023 14:59)  Multiple Vitamins oral tablet: 1 tab(s) orally once a day (2023 02:35)  pantoprazole 40 mg oral delayed release tablet: 1 tab(s) orally once a day (before a meal) (2023 02:35)  prasugrel 10 mg oral tablet: 1 tab(s) orally once a day (2023 02:35)  QUEtiapine 25 mg oral tablet: 1 tab(s) orally once a day (2023 14:57)  rosuvastatin 40 mg oral tablet: 1 tab(s) orally once a day (2023 02:35)  spironolactone 25 mg oral tablet: 1 tab(s) orally once a day (2023 02:35)      VITALS:  T(F): 99.3 (23 @ 13:25), Max: 99.3 (23 @ 13:25)  HR: 87 (23 @ 14:00)  BP: 104/59 (23 @ 14:00)  RR: 18 (23 @ 14:00)  SpO2: 100% (23 @ 14:00)  Wt(kg): --  I&O's Detail    2023 07:  -  2023 15:07  --------------------------------------------------------  IN:    Dexmedetomidine: 3.4 mL    FentaNYL: 19.8 mL    Milrinone: 4.8 mL    Norepinephrine: 12.4 mL  Total IN: 40.4 mL    OUT:    Indwelling Catheter - Urethral (mL): 1000 mL  Total OUT: 1000 mL    Total NET: -959.6 mL        I&O's Summary    2023 07:01  -  2023 15:07  --------------------------------------------------------  IN: 40.4 mL / OUT: 1000 mL / NET: -959.6 mL          PHYSICAL EXAM:  Gen: intubated/ventilated  Resp: b/l breath sounds  Card: S1/S2  Abd: soft  Extremities: no edema      LABS:  ABG - ( 2023 11:37 )  pH, Arterial: 7.47  pH, Blood: x     /  pCO2: 49    /  pO2: 225   / HCO3: 36    / Base Excess: 10.7  /  SaO2: 99.9              147<H>  |  100  |  54<H>  ----------------------------<  210<H>  5.0   |  29  |  3.6<H>    Ca    9.1      2023 09:40  Mg     2.1         TPro  6.2  /  Alb  3.3<L>  /  TBili  3.1<H>  /  DBili      /  AST  152<H>  /  ALT  78<H>  /  AlkPhos  122<H>      Phosphorus Level, Serum: 4.5 mg/dL (23 @ 17:04)  Phosphorus Level, Serum: 2.9 mg/dL (23 @ 17:06)    Vitamin D, 25-Hydroxy: 36 ng/mL (21 @ 06:53)                          10.6   8.45  )-----------( 208      ( 2023 09:40 )             36.5     Mean Cell Volume: 74.2 fL (23 @ 09:40)    Iron Total, Serum: 13 ug/dL (23 @ 04:30)  % Saturation, Iron: 3 % (23 @ 04:30)      Urine Studies:  Urinalysis Basic - ( 2023 12:15 )    Color: Yellow / Appearance: Turbid / S.020 / pH:   Gluc:  / Ketone: Negative  / Bili: Negative / Urobili: <2 mg/dL   Blood:  / Protein: 300 mg/dL / Nitrite: Positive   Leuk Esterase: Large / RBC: 20 /HPF / WBC >720 /HPF   Sq Epi:  / Non Sq Epi:  / Bacteria: Moderate        Urea Nitrogen,  Random Urine: 350 mg/dL (23 @ 11:00)    Sodium, Random Urine: <20.0 mmoL/L ( @ 11:00)  Creatinine, Random Urine: 110 mg/dL ( @ 11:00)    Creatinine trend:  Creatinine, Serum: 3.6 mg/dL (23 @ 09:40)  Creatinine, Serum: 3.4 mg/dL (23 @ 08:05)  Creatinine, Serum: 3.2 mg/dL (23 @ 20:00)  Creatinine, Serum: 2.9 mg/dL (23 @ 18:31)  Creatinine, Serum: 2.5 mg/dL (23 @ 08:38)  Creatinine, Serum: 1.7 mg/dL (23 @ 22:25)  Creatinine, Serum: 1.5 mg/dL (23 @ 08:48)        US Kidney and Bladder:   ACC: [id number] EXAM:  US KIDNEYS AND BLADDER   ORDERED BY: SATURNINO ROMERO     PROCEDURE DATE:  2023          INTERPRETATION:  CLINICAL INFORMATION: Acute kidney injury    Correlation is made with CT abdomen and pelvis May 18, 2023    TECHNIQUE: Sonography of the kidneys and bladder.    FINDINGS:    Right kidney: 12.0 cm. No hydronephrosis or calculi.    Left kidney:  10.0 cm. Mild left hydronephrosis/dilated pelvis. Left   lower pole 0.5 cm nonobstructing calculus.    Urinary bladder: Urinary bladder volume of 232 cc. Patient unable to void   during this examination. Circumferential wall thickening. No intraluminal   debris or calculi. Bilateral ureteral jets are visualized.    Partially imaged abdominal pelvic ascites and bilateral effusions.    IMPRESSION:    Mild left hydronephrosis/dilated pelvis, unchanged. Left lower pole 0.5   cm nonobstructing calculus.    Partially imaged abdominopelvic ascites and bilateral pleural effusions.          < from: CT Abdomen and Pelvis w/ IV Cont (23 @ 11:00) >    ACC: [id number] EXAM:  CT ABDOMEN AND PELVIS IC   ORDERED BY: TAMIKO CHINO     ACC: [id number] EXAM:  CT CHEST IC   ORDERED BY: TAMIKO CHINO     PROCEDURE DATE:  2023          INTERPRETATION:  CLINICAL STATEMENT: Trauma    TECHNIQUE: Contiguous axial CT images were obtained from the thoracic   inlet to the pubic symphysis following administration of 100c Optiray 320   intravenous contrast.  Oral contrast was not administered.  Reformatted   images in the coronal and sagittal planes were acquired.    COMPARISON CT: CT chest 2018 and CT abdomen and pelvis   2022    OTHER STUDIES USED FOR CORRELATION: None.      FINDINGS:    CHEST:    LUNGS/PLEURA/AIRWAYS: Trachea and central airways are patent. Moderate to   large bilateral pleural effusions with lower lobe atelectasis, right   greater than left. No pneumothorax.    MEDIASTINUM/THORACIC NODES: No enlarged mediastinal, hilar or axillary   lymph nodes..    HEART/GREAT VESSELS: Ancef coronary atherosclerosis. Cardiomegaly without   pericardial effusion. Focal discontinuity anterior left atrial wall   (series 4 image 117), unchanged. Left chest wall pacer, unchanged.      ABDOMEN/PELVIS:    HEPATOBILIARY: Postcholecystectomy no suspicious parenchymal lesion or   biliary ductal dilatation.    SPLEEN: Unremarkable.    PANCREAS: Unremarkable.    ADRENAL GLANDS: Unremarkable.    KIDNEYS: Unchanged left renal pelvic dilatation with transition to normal   caliber ureter at UPJ. Unchanged left renal atrophyand lower pole   intrarenal nonobstructing calculi.    ABDOMINOPELVIC NODES: Unremarkable.    PELVIC ORGANS: Foci of air within urinary bladder, possibly reflecting   recent instrumentation.    PERITONEUM/MESENTERY/BOWEL: Small to moderate volume abdominopelvic   ascites. No bowel obstruction or pneumoperitoneum    BONES/SOFT TISSUES: Chronic bilateral rib deformities. No CT evidence for   acute osseous abnormality. Chronic multilevel degenerative changes of the   spine. Partially imaged right femoral hardware.      IMPRESSION:    No CT evidence for acute traumatic injury within the chest, abdomen or   pelvis.    Moderate to large bilateral pleural effusions, right greater than left.   Small to moderate volume abdominopelvic ascites.    Cardiomegaly with focal discontinuity anterior left atrial wall,   unchanged.    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    --- End of Report ---            ELLIOT LANDAU MD; Attending Radiologist  This document has been electronically signed. May 18 2023 11:25AM    < end of copied text >   Nephrology Progress Note    FLOWER MARISCAL  MRN-[id number]  67y  Male    S:  Patient is seen and examined, events over the last 24h noted.    O:  Allergies:  ACE inhibitors (Rash)  Entresto (Swelling)  Atorvastatin Calcium (Unknown)  Bactrim (Unknown)  Plavix (Unknown)    Hospital Medications:   MEDICATIONS  (STANDING):  aspirin enteric coated 81 milliGRAM(s) Oral daily  buMETAnide Injectable 2 milliGRAM(s) IV Push two times a day  cefepime   IVPB 2000 milliGRAM(s) IV Intermittent every 24 hours  chlorhexidine 0.12% Liquid 15 milliLiter(s) Oral Mucosa every 12 hours  chlorhexidine 2% Cloths 1 Application(s) Topical daily  dexMEDEtomidine Infusion 0.2 MICROgram(s)/kG/Hr (3.25 mL/Hr) IV Continuous <Continuous>  DULoxetine 30 milliGRAM(s) Oral <User Schedule>  fentaNYL   Infusion. 0.5 MICROgram(s)/kG/Hr (3.25 mL/Hr) IV Continuous <Continuous>  folic acid 1 milliGRAM(s) Oral <User Schedule>  heparin   Injectable 5000 Unit(s) SubCutaneous every 12 hours  hydrocortisone 2.5% Cream 1 Application(s) Topical two times a day  insulin lispro (ADMELOG) corrective regimen sliding scale   SubCutaneous three times a day before meals  lactulose Syrup 10 Gram(s) Oral at bedtime  levothyroxine 25 MICROGram(s) Oral <User Schedule>  levothyroxine 50 MICROGram(s) Oral <User Schedule>  magnesium sulfate  IVPB 2 Gram(s) IV Intermittent once  methotrexate (Non - oncologic) 10 milliGRAM(s) Oral every week  metoprolol succinate ER 25 milliGRAM(s) Oral daily  midodrine. 10 milliGRAM(s) Oral three times a day  milrinone Infusion 0.125 MICROgram(s)/kG/Min (2.44 mL/Hr) IV Continuous <Continuous>  norepinephrine Infusion 0.05 MICROgram(s)/kG/Min (6.09 mL/Hr) IV Continuous <Continuous>  pantoprazole    Tablet 40 milliGRAM(s) Oral before breakfast  polyethylene glycol 3350 17 Gram(s) Oral two times a day  prasugrel 10 milliGRAM(s) Oral daily  senna 2 Tablet(s) Oral at bedtime  vancomycin  IVPB 500 milliGRAM(s) IV Intermittent every 8 hours    MEDICATIONS  (PRN):  acetaminophen     Tablet .. 325 milliGRAM(s) Oral every 4 hours PRN Moderate Pain (4 - 6)  dextrose Oral Gel 15 Gram(s) Oral once PRN Blood Glucose LESS THAN 70 milliGRAM(s)/deciliter  oxyCODONE    IR 5 milliGRAM(s) Oral every 6 hours PRN Pain 4-10    Home Medications:  aspirin 81 mg oral delayed release tablet: 1 tab(s) orally once a day (2023 02:35)  diazePAM 2 mg oral tablet: 0.5 tab(s) orally once a day (at bedtime) (2023 02:35)  DULoxetine 30 mg oral delayed release capsule: 1 cap(s) orally 3 times a day (2023 02:35)  insulin glargine 100 units/mL subcutaneous solution: 25 unit(s) subcutaneous once a day (at bedtime) (2023 02:35)  insulin glargine 100 units/mL subcutaneous solution: 40 microcurie subcutaneous once a day (2023 02:35)  insulin lispro 100 units/mL injectable solution: if your finger stick is 150-199 please inject 2 units  if your finger stick is 200-250 please inject 4 units  if your finger stick is 251-300 please inject 6 units  if your finger stick is 301-350 please inject 8 units  if your finger stick is more than 350 please call your physician    (2023 02:35)  levothyroxine 25 mcg (0.025 mg) oral tablet: 1 tab(s) orally 6 times a week (2023 02:35)  levothyroxine 50 mcg (0.05 mg) oral tablet: 1 tab(s) orally once a week (2023 02:35)  metoprolol succinate 25 mg oral tablet, extended release: 1 tab(s) orally once a day (2023 02:35)  midodrine 5 mg oral tablet: 1 tab(s) orally 2 times a day (2023 14:59)  Multiple Vitamins oral tablet: 1 tab(s) orally once a day (2023 02:35)  pantoprazole 40 mg oral delayed release tablet: 1 tab(s) orally once a day (before a meal) (2023 02:35)  prasugrel 10 mg oral tablet: 1 tab(s) orally once a day (2023 02:35)  QUEtiapine 25 mg oral tablet: 1 tab(s) orally once a day (2023 14:57)  rosuvastatin 40 mg oral tablet: 1 tab(s) orally once a day (2023 02:35)  spironolactone 25 mg oral tablet: 1 tab(s) orally once a day (2023 02:35)      VITALS:  T(F): 99.3 (23 @ 13:25), Max: 99.3 (23 @ 13:25)  HR: 87 (23 @ 14:00)  BP: 104/59 (23 @ 14:00)  RR: 18 (23 @ 14:00)  SpO2: 100% (23 @ 14:00)  Wt(kg): --  I&O's Detail    2023 07:  -  2023 15:07  --------------------------------------------------------  IN:    Dexmedetomidine: 3.4 mL    FentaNYL: 19.8 mL    Milrinone: 4.8 mL    Norepinephrine: 12.4 mL  Total IN: 40.4 mL    OUT:    Indwelling Catheter - Urethral (mL): 1000 mL  Total OUT: 1000 mL    Total NET: -959.6 mL        I&O's Summary    2023 07:01  -  2023 15:07  --------------------------------------------------------  IN: 40.4 mL / OUT: 1000 mL / NET: -959.6 mL          PHYSICAL EXAM:  Gen: intubated/ventilated  Resp: b/l breath sounds  Card: S1/S2  Abd: soft  Extremities: no edema  Loja draining purulent urine      LABS:  ABG - ( 2023 11:37 )  pH, Arterial: 7.47  pH, Blood: x     /  pCO2: 49    /  pO2: 225   / HCO3: 36    / Base Excess: 10.7  /  SaO2: 99.9              147<H>  |  100  |  54<H>  ----------------------------<  210<H>  5.0   |  29  |  3.6<H>    Ca    9.1      2023 09:40  Mg     2.1         TPro  6.2  /  Alb  3.3<L>  /  TBili  3.1<H>  /  DBili      /  AST  152<H>  /  ALT  78<H>  /  AlkPhos  122<H>      Phosphorus Level, Serum: 4.5 mg/dL (23 @ 17:04)  Phosphorus Level, Serum: 2.9 mg/dL (23 @ 17:06)    Vitamin D, 25-Hydroxy: 36 ng/mL (21 @ 06:53)                          10.6   8.45  )-----------( 208      ( 2023 09:40 )             36.5     Mean Cell Volume: 74.2 fL (23 @ 09:40)    Iron Total, Serum: 13 ug/dL (23 @ 04:30)  % Saturation, Iron: 3 % (23 @ 04:30)      Urine Studies:  Urinalysis Basic - ( 2023 12:15 )    Color: Yellow / Appearance: Turbid / S.020 / pH:   Gluc:  / Ketone: Negative  / Bili: Negative / Urobili: <2 mg/dL   Blood:  / Protein: 300 mg/dL / Nitrite: Positive   Leuk Esterase: Large / RBC: 20 /HPF / WBC >720 /HPF   Sq Epi:  / Non Sq Epi:  / Bacteria: Moderate        Urea Nitrogen,  Random Urine: 350 mg/dL (23 @ 11:00)    Sodium, Random Urine: <20.0 mmoL/L ( @ 11:00)  Creatinine, Random Urine: 110 mg/dL ( @ 11:00)    Creatinine trend:  Creatinine, Serum: 3.6 mg/dL (23 @ 09:40)  Creatinine, Serum: 3.4 mg/dL (23 @ 08:05)  Creatinine, Serum: 3.2 mg/dL (23 @ 20:00)  Creatinine, Serum: 2.9 mg/dL (23 @ 18:31)  Creatinine, Serum: 2.5 mg/dL (23 @ 08:38)  Creatinine, Serum: 1.7 mg/dL (23 @ 22:25)  Creatinine, Serum: 1.5 mg/dL (23 @ 08:48)        US Kidney and Bladder:   ACC: [id number] EXAM:  US KIDNEYS AND BLADDER   ORDERED BY: SATURNINO ROMERO     PROCEDURE DATE:  2023          INTERPRETATION:  CLINICAL INFORMATION: Acute kidney injury    Correlation is made with CT abdomen and pelvis May 18, 2023    TECHNIQUE: Sonography of the kidneys and bladder.    FINDINGS:    Right kidney: 12.0 cm. No hydronephrosis or calculi.    Left kidney:  10.0 cm. Mild left hydronephrosis/dilated pelvis. Left   lower pole 0.5 cm nonobstructing calculus.    Urinary bladder: Urinary bladder volume of 232 cc. Patient unable to void   during this examination. Circumferential wall thickening. No intraluminal   debris or calculi. Bilateral ureteral jets are visualized.    Partially imaged abdominal pelvic ascites and bilateral effusions.    IMPRESSION:    Mild left hydronephrosis/dilated pelvis, unchanged. Left lower pole 0.5   cm nonobstructing calculus.    Partially imaged abdominopelvic ascites and bilateral pleural effusions.          < from: CT Abdomen and Pelvis w/ IV Cont (23 @ 11:00) >    ACC: [id number] EXAM:  CT ABDOMEN AND PELVIS IC   ORDERED BY: TAMIKO CHINO     ACC: [id number] EXAM:  CT CHEST IC   ORDERED BY: TAMIKO CHINO     PROCEDURE DATE:  2023          INTERPRETATION:  CLINICAL STATEMENT: Trauma    TECHNIQUE: Contiguous axial CT images were obtained from the thoracic   inlet to the pubic symphysis following administration of 100c Optiray 320   intravenous contrast.  Oral contrast was not administered.  Reformatted   images in the coronal and sagittal planes were acquired.    COMPARISON CT: CT chest 2018 and CT abdomen and pelvis   2022    OTHER STUDIES USED FOR CORRELATION: None.      FINDINGS:    CHEST:    LUNGS/PLEURA/AIRWAYS: Trachea and central airways are patent. Moderate to   large bilateral pleural effusions with lower lobe atelectasis, right   greater than left. No pneumothorax.    MEDIASTINUM/THORACIC NODES: No enlarged mediastinal, hilar or axillary   lymph nodes..    HEART/GREAT VESSELS: Ancef coronary atherosclerosis. Cardiomegaly without   pericardial effusion. Focal discontinuity anterior left atrial wall   (series 4 image 117), unchanged. Left chest wall pacer, unchanged.      ABDOMEN/PELVIS:    HEPATOBILIARY: Postcholecystectomy no suspicious parenchymal lesion or   biliary ductal dilatation.    SPLEEN: Unremarkable.    PANCREAS: Unremarkable.    ADRENAL GLANDS: Unremarkable.    KIDNEYS: Unchanged left renal pelvic dilatation with transition to normal   caliber ureter at UPJ. Unchanged left renal atrophyand lower pole   intrarenal nonobstructing calculi.    ABDOMINOPELVIC NODES: Unremarkable.    PELVIC ORGANS: Foci of air within urinary bladder, possibly reflecting   recent instrumentation.    PERITONEUM/MESENTERY/BOWEL: Small to moderate volume abdominopelvic   ascites. No bowel obstruction or pneumoperitoneum    BONES/SOFT TISSUES: Chronic bilateral rib deformities. No CT evidence for   acute osseous abnormality. Chronic multilevel degenerative changes of the   spine. Partially imaged right femoral hardware.      IMPRESSION:    No CT evidence for acute traumatic injury within the chest, abdomen or   pelvis.    Moderate to large bilateral pleural effusions, right greater than left.   Small to moderate volume abdominopelvic ascites.    Cardiomegaly with focal discontinuity anterior left atrial wall,   unchanged.    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    --- End of Report ---            ELLIOT LANDAU MD; Attending Radiologist  This document has been electronically signed. May 18 2023 11:25AM    < end of copied text >

## 2023-06-05 NOTE — CONSULT NOTE ADULT - SUBJECTIVE AND OBJECTIVE BOX
FLOWER MARISCAL  67y, Male  Allergy: ACE inhibitors (Rash)  Entresto (Swelling)  Atorvastatin Calcium (Unknown)  Bactrim (Unknown)  Plavix (Unknown)      CHIEF COMPLAINT: Mechanical fall (2023 15:07)      LOS  18d    HPI:  67-year-old male w/ HFrEF 15-20% s/p ICD, decreased RV function, mild MR/TR, DM type I w/ neuropathy and hx hypoglycemia , DL, HTN, CAD, hx MI, s/p CABG, s/p stent (2018), hx in-stent thrombosis while on Plavix, PAD s/p 3 LLE stents, TIAGO (needs CPAP auto-regulating pressure 10-16, patient doesn't use it due to claustrophobia), Psoriasis, R-AKA presenting to ED s/p fall. He states he was lying in bed at home when he fell asleep and fell off the bed, and complains of generalized headache neck pain, non-radiating, no alleviating or aggravating factors, associated with right anterior chest wall pain. Denies LOC,  fevers, chills, nausea, vomiting, dizziness, abdominal pain, shortness of breath. States he had TDAP recently. Pt was not down for long as wife was upstairs when he fell off the bed and called EMS right away.     ED  Vital Signs  T(F):Max: 97.9   HR: 76 - 110  BP: 100/61 - 102/62  RR: 18   SpO2: 94% - 96% on room air     Labs significant for cr 1.8 (baseline ~ 1.2)  trop 0.10             (18 May 2023 14:16)      INFECTIOUS DISEASE HISTORY:  ID consulted for evaluation of possible septic shock.   Worsening mentation on , followed by intubation   Loja placed with cloudy urine.     PAST MEDICAL & SURGICAL HISTORY:  Status post percutaneous transluminal coronary angioplasty  2 stents      Atherosclerosis of coronary artery  CAD (coronary artery disease)      Osteoarthritis      HLD (hyperlipidemia)      Diabetes  on insulin pump      CHF (congestive heart failure)  EF ~ 25%      History of celiac disease      Diverticulitis      STEMI (ST elevation myocardial infarction)      Diabetic neuropathy  Hands and Feet      Anxiety and depression      Other postprocedural status  Fixation hardware in foot LEFT      Stented coronary artery  10/18 heart attack  INFERIOR WALL MI      S/P CABG x 1        S/P TKR (total knee replacement), right  with infection Mrsa   per pt he was cleared from MRSA infection      Surgery, elective  right knee wound debridement      History of open reduction and internal fixation (ORIF) procedure  right hip      H/O shoulder surgery  right      AICD (automatic cardioverter/defibrillator) present  [facility]      Cholecystostomy care  drain inserted  & removed 4 months ago      History of tonsillectomy      History of hip replacement, total, right      Elective surgery  plastic surgery Left shin          FAMILY HISTORY  Family history of heart disease (Mother)    Family history of heart disease (Mother)    Family history of allergies        SOCIAL HISTORY  Social History:  Ex-smoker quit 30 years ago. No alcohol use. No illicit drug use. Wheel-chair bound at baseline, pt's wife takes care of him, transfer himself in and out of wheelchair. Pt does not have orthotic yet. (2023 03:35)        ROS  unable to obtain history secondary to patient's mental status and/or sedation      VITALS:  T(F): 99.3, Max: 99.3 (23 @ 13:25)  HR: 87  BP: 104/59  RR: 18Vital Signs Last 24 Hrs  T(C): 37.4 (2023 13:25), Max: 37.4 (2023 13:25)  T(F): 99.3 (2023 13:25), Max: 99.3 (2023 13:25)  HR: 87 (2023 14:00) (86 - 113)  BP: 104/59 (2023 14:00) (99/58 - 111/81)  BP(mean): 78 (2023 14:00) (75 - 78)  RR: 18 (2023 14:00) (18 - 20)  SpO2: 100% (2023 14:00) (100% - 100%)    Parameters below as of 2023 15:00  Patient On (Oxygen Delivery Method): ventilator    O2 Concentration (%): 40    PHYSICAL EXAM:  Gen: NC/AT  HEENT: Normocephalic, atraumatic  Neck: supple, no lymphadenopathy  CV: Regular rate & regular rhythm  Lungs: decreased BS at bases, no fremitus  Abdomen: Soft, BS present  Ext: Warm, well perfused  Neuro: non focal, awake  Skin: no rash, no erythema  Lines: no phlebitis    TESTS & MEASUREMENTS:                        10.6   8.45  )-----------( 208      ( 2023 09:40 )             36.5     06-05    147<H>  |  100  |  54<H>  ----------------------------<  210<H>  5.0   |  29  |  3.6<H>    Ca    9.1      2023 09:40  Mg     2.1     06-    TPro  6.2  /  Alb  3.3<L>  /  TBili  3.1<H>  /  DBili  x   /  AST  152<H>  /  ALT  78<H>  /  AlkPhos  122<H>  06-      LIVER FUNCTIONS - ( 2023 09:40 )  Alb: 3.3 g/dL / Pro: 6.2 g/dL / ALK PHOS: 122 U/L / ALT: 78 U/L / AST: 152 U/L / GGT: x           Urinalysis Basic - ( 2023 12:15 )    Color: Yellow / Appearance: Turbid / S.020 / pH: x  Gluc: x / Ketone: Negative  / Bili: Negative / Urobili: <2 mg/dL   Blood: x / Protein: 300 mg/dL / Nitrite: Positive   Leuk Esterase: Large / RBC: 20 /HPF / WBC >720 /HPF   Sq Epi: x / Non Sq Epi: x / Bacteria: Moderate        Culture - Blood (collected 23 @ 17:04)  Source: .Blood None  Final Report (23 @ 01:00):    No Growth Final    Culture - Fungal, Blood (collected 23 @ 22:39)  Source: .Blood Blood  Final Report (02-15-23 @ 15:01):    No fungus isolated at 4 weeks.    Culture - Blood (collected 23 @ 19:11)  Source: .Blood Blood  Final Report (23 @ 23:00):    No Growth Final    Culture - Blood (collected 01-10-23 @ 20:47)  Source: .Blood Blood-Peripheral  Final Report (23 @ 03:00):    No Growth Final    Culture - Blood (collected 01-10-23 @ 20:47)  Source: .Blood Blood-Peripheral  Final Report (23 @ 03:00):    No Growth Final    Culture - Blood (collected 06-15-22 @ 19:15)  Source: .Blood Blood-Venous  Final Report (22 @ 21:00):    No growth at 5 days.    Culture - Blood (collected 06-15-22 @ 19:10)  Source: .Blood Blood-Venous  Final Report (22 @ 21:00):    No growth at 5 days.        Lactate, Blood: 4.6 mmol/L (23 @ 09:40)  Blood Gas Venous - Lactate: 4.60 mmol/L (23 @ 09:40)  Lactate, Blood: 2.9 mmol/L (23 @ 18:31)      INFECTIOUS DISEASES TESTING  Procalcitonin, Serum: 0.56 ng/mL (23 @ 11:50)  Procalcitonin, Serum: 0.09 ng/mL (23 @ 17:04)  HIV-1/2 Combo Result: Nonreact (23 @ 11:27)  COVID-19 PCR: NotDetec (01-10-23 @ 18:16)  COVID-19 PCR: NotDetec (22 @ 09:52)  COVID-19 PCR: Negative (06-15-22 @ 18:54)      RADIOLOGY & ADDITIONAL TESTS:  I have personally reviewed the last Chest xray  CXR  Xray Chest 1 View- PORTABLE-Urgent:   ACC: [id number] EXAM:  XR CHEST PORTABLE URGENT 1V   ORDERED BY: BANG GUZMÁN     PROCEDURE DATE:  2023          INTERPRETATION:  Clinical History/Reason for Exam:  post intubation    Technique:  Frontal view the chest.    Comparison: Chest x-ray 2023.    Findings:    Support devices:  Endotracheal tube tip 4.4 cm above hansa. Left-sided   ICD stable position.    Cardiac/mediastinum/hilum: Stable    Lung parenchyma/ Pleura: Diminishing basilar opacities/effusions. No   pneumothorax      Skeleton/soft tissues: Stable      Impression: Diminishing basilar opacities/effusions. No pneumothorax    ET tube tip 4.4 cm above hansa.    --- End of Report ---            MAGDALENA CORADO MD; Attending Radiologist  This document has been electronically signed. 2023 11:45AM (23 @ 11:14)      CT      CARDIOLOGY TESTING  12 Lead ECG:   Ventricular Rate 102 BPM    Atrial Rate 102 BPM    P-R Interval 166 ms    QRS Duration 92 ms    Q-T Interval 370 ms    QTC Calculation(Bazett) 482 ms    R Axis -81 degrees    T Axis 59 degrees    Diagnosis Line Atrial-sensed ventricular-paced rhythm  Abnormal ECG    Confirmed by London Santos ([id number]) on 2023 8:36:13 AM (23 @ 11:54)  12 Lead ECG:   Ventricular Rate 80 BPM    Atrial Rate 80 BPM    P-R Interval 164 ms    QRS Duration 144 ms    Q-T Interval 440 ms    QTC Calculation(Bazett) 507 ms    P Axis 37 degrees    R Axis 151 degrees    T Axis 120 degrees    Diagnosis Line Atrial-sensed ventricular-paced rhythm with occasional AV dual-paced complexes  Abnormal ECG    Confirmed by London Santos ([id number]) on 2023 4:50:12 PM (23 @ 13:39)      MEDICATIONS  aspirin enteric coated 81 Oral daily  buMETAnide Injectable 2 IV Push two times a day  cefepime   IVPB 2000 IV Intermittent every 24 hours  chlorhexidine 0.12% Liquid 15 Oral Mucosa every 12 hours  chlorhexidine 2% Cloths 1 Topical daily  dexMEDEtomidine Infusion 0.2 IV Continuous <Continuous>  dextrose 5%. 1000 IV Continuous <Continuous>  dextrose 5%. 1000 IV Continuous <Continuous>  dextrose 50% Injectable 25 IV Push once  dextrose 50% Injectable 12.5 IV Push once  dextrose 50% Injectable 25 IV Push once  dextrose 50% Injectable 25 IV Push once  DULoxetine 30 Oral <User Schedule>  fentaNYL   Infusion. 0.5 IV Continuous <Continuous>  folic acid 1 Oral <User Schedule>  glucagon  Injectable 1 IntraMuscular once  heparin   Injectable 5000 SubCutaneous every 12 hours  hydrocortisone 2.5% Cream 1 Topical two times a day  insulin lispro (ADMELOG) corrective regimen sliding scale  SubCutaneous three times a day before meals  lactulose Syrup 10 Oral at bedtime  levothyroxine 25 Oral <User Schedule>  levothyroxine 50 Oral <User Schedule>  magnesium sulfate  IVPB 2 IV Intermittent once  magnesium sulfate  IVPB 2 IV Intermittent once  methotrexate (Non - oncologic) 10 Oral every week  metoprolol succinate ER 25 Oral daily  midodrine. 10 Oral three times a day  milrinone Infusion 0.125 IV Continuous <Continuous>  norepinephrine Infusion 0.05 IV Continuous <Continuous>  pantoprazole    Tablet 40 Oral before breakfast  polyethylene glycol 3350 17 Oral two times a day  prasugrel 10 Oral daily  senna 2 Oral at bedtime  vancomycin  IVPB 500 IV Intermittent every 8 hours      Weight  Weight (kg): 65 (23 @ 13:26)    ANTIBIOTICS:  cefepime   IVPB 2000 milliGRAM(s) IV Intermittent every 24 hours  vancomycin  IVPB 500 milliGRAM(s) IV Intermittent every 8 hours      ALLERGIES:  ACE inhibitors (Rash)  Entresto (Swelling)  Atorvastatin Calcium (Unknown)  Bactrim (Unknown)  Plavix (Unknown)

## 2023-06-05 NOTE — PROGRESS NOTE ADULT - SUBJECTIVE AND OBJECTIVE BOX
SUBJ: Patient seen and examined. confused and unresponsive.       MEDICATIONS  (STANDING):  aspirin enteric coated 81 milliGRAM(s) Oral daily  buMETAnide Injectable 2 milliGRAM(s) IV Push two times a day  cefepime   IVPB 2000 milliGRAM(s) IV Intermittent every 24 hours  chlorhexidine 0.12% Liquid 15 milliLiter(s) Oral Mucosa every 12 hours  chlorhexidine 2% Cloths 1 Application(s) Topical daily  dexMEDEtomidine Infusion 0.2 MICROgram(s)/kG/Hr (3.25 mL/Hr) IV Continuous <Continuous>  dextrose 5%. 1000 milliLiter(s) (100 mL/Hr) IV Continuous <Continuous>  dextrose 5%. 1000 milliLiter(s) (50 mL/Hr) IV Continuous <Continuous>  dextrose 50% Injectable 25 Gram(s) IV Push once  dextrose 50% Injectable 25 Gram(s) IV Push once  dextrose 50% Injectable 12.5 Gram(s) IV Push once  dextrose 50% Injectable 25 milliLiter(s) IV Push once  DULoxetine 30 milliGRAM(s) Oral <User Schedule>  fentaNYL   Infusion. 0.5 MICROgram(s)/kG/Hr (3.25 mL/Hr) IV Continuous <Continuous>  folic acid 1 milliGRAM(s) Oral <User Schedule>  glucagon  Injectable 1 milliGRAM(s) IntraMuscular once  heparin   Injectable 5000 Unit(s) SubCutaneous every 12 hours  hydrocortisone 2.5% Cream 1 Application(s) Topical two times a day  insulin lispro (ADMELOG) corrective regimen sliding scale   SubCutaneous three times a day before meals  lactulose Syrup 10 Gram(s) Oral at bedtime  levothyroxine 25 MICROGram(s) Oral <User Schedule>  levothyroxine 50 MICROGram(s) Oral <User Schedule>  magnesium sulfate  IVPB 2 Gram(s) IV Intermittent once  magnesium sulfate  IVPB 2 Gram(s) IV Intermittent once  methotrexate (Non - oncologic) 10 milliGRAM(s) Oral every week  metoprolol succinate ER 25 milliGRAM(s) Oral daily  midodrine. 10 milliGRAM(s) Oral three times a day  milrinone Infusion 0.125 MICROgram(s)/kG/Min (2.44 mL/Hr) IV Continuous <Continuous>  norepinephrine Infusion 0.05 MICROgram(s)/kG/Min (6.09 mL/Hr) IV Continuous <Continuous>  pantoprazole    Tablet 40 milliGRAM(s) Oral before breakfast  polyethylene glycol 3350 17 Gram(s) Oral two times a day  prasugrel 10 milliGRAM(s) Oral daily  senna 2 Tablet(s) Oral at bedtime  vancomycin  IVPB 500 milliGRAM(s) IV Intermittent every 8 hours    MEDICATIONS  (PRN):  acetaminophen     Tablet .. 325 milliGRAM(s) Oral every 4 hours PRN Moderate Pain (4 - 6)  dextrose Oral Gel 15 Gram(s) Oral once PRN Blood Glucose LESS THAN 70 milliGRAM(s)/deciliter  oxyCODONE    IR 5 milliGRAM(s) Oral every 6 hours PRN Pain 4-10            Vital Signs Last 24 Hrs  T(C): 37.4 (05 Jun 2023 13:25), Max: 37.4 (05 Jun 2023 13:25)  T(F): 99.3 (05 Jun 2023 13:25), Max: 99.3 (05 Jun 2023 13:25)  HR: 87 (05 Jun 2023 14:00) (86 - 113)  BP: 104/59 (05 Jun 2023 14:00) (99/58 - 111/81)  BP(mean): 78 (05 Jun 2023 14:00) (75 - 78)  RR: 18 (05 Jun 2023 14:00) (18 - 20)  SpO2: 100% (05 Jun 2023 14:00) (100% - 100%)    Parameters below as of 05 Jun 2023 15:00  Patient On (Oxygen Delivery Method): ventilator    O2 Concentration (%): 40     REVIEW OF SYSTEMS:  CONSTITUTIONAL: No fever  ROS cannot be obtained     PHYSICAL EXAM:  GENERAL: mild resp distress  HEAD:  Atraumatic, Normocephalic  NECK: Supple, No JVD  NERVOUS SYSTEM:  confused and agitated   CHEST/LUNG: Clear to anterior auscultation bilaterally  HEART: Regular rate and rhythm  ABDOMEN: Soft, Nontender  EXTREMITIES:  No  edema    	  TELEMETRY:      LABS:                        10.6   8.45  )-----------( 208      ( 05 Jun 2023 09:40 )             36.5     06-05    147<H>  |  100  |  54<H>  ----------------------------<  210<H>  5.0   |  29  |  3.6<H>    Ca    9.1      05 Jun 2023 09:40  Mg     2.1     06-04    TPro  6.2  /  Alb  3.3<L>  /  TBili  3.1<H>  /  DBili  x   /  AST  152<H>  /  ALT  78<H>  /  AlkPhos  122<H>  06-05    CARDIAC MARKERS ( 05 Jun 2023 09:40 )  x     / 0.23 ng/mL / x     / x     / x          PT/INR - ( 05 Jun 2023 09:40 )   PT: 24.90 sec;   INR: 2.13 ratio         PTT - ( 05 Jun 2023 09:40 )  PTT:28.1 sec    I&O's Summary    05 Jun 2023 07:01  -  05 Jun 2023 16:08  --------------------------------------------------------  IN: 40.4 mL / OUT: 1000 mL / NET: -959.6 mL      BNP  RADIOLOGY & ADDITIONAL STUDIES:    IMPRESSION AND PLAN:    HFrEF  Lactic acidosis  MOISES  AMS    - Management as per primary team  - Transfer to CCU/ inotropic support  - Appreciate HF team input  - ID consult   - Will follow

## 2023-06-05 NOTE — CONSULT NOTE ADULT - ASSESSMENT
IMPRESSION:    Acute hypoxemic resp failure on NRM  Pul edema  HFrEF  Lactic acidosis  Acute renal failure  possible bacterial superinfection  DM  Psoriasis  PVD  TIAGO      PLAN:    CNS:  head CT, EEG    HEENT:  Oral care    PULMONARY:  HOB @ 45 degrees, ABG will need intubation, keep Sao2 92 to 96%    CARDIOVASCULAR: cardio fup, pressors/ inotropes if needed, trend LA    GI: GI prophylaxis                                          Feeding NPO    RENAL:  F/u  lytes.  Correct as needed. accurate I/O, fields, renal US    INFECTIOUS DISEASE: cefepime, procal, pancx, fungitell    HEMATOLOGICAL:  DVT prophylaxis. LE doppler    ENDOCRINE:  Follow up FS.  Insulin protocol if needed. cortisol level    upgrade to unit    very poor prognosis    GOC

## 2023-06-05 NOTE — CONSULT NOTE ADULT - SUBJECTIVE AND OBJECTIVE BOX
Patient is a 67y old  Male who presents with a chief complaint of Mechanical fall (2023 11:17)      HPI:  67-year-old male w/ HFrEF 15-20% s/p ICD, decreased RV function, mild MR/TR, DM type I w/ neuropathy and hx hypoglycemia , DL, HTN, CAD, hx MI, s/p CABG, s/p stent (2018), hx in-stent thrombosis while on Plavix, PAD s/p 3 LLE stents, TIAGO (needs CPAP auto-regulating pressure 10-16, patient doesn't use it due to claustrophobia), Psoriasis, R-AKA presenting to ED s/p fall. He states he was lying in bed at home when he fell asleep and fell off the bed, and complains of generalized headache neck pain, non-radiating, no alleviating or aggravating factors, associated with right anterior chest wall pain. Denies LOC,  fevers, chills, nausea, vomiting, dizziness, abdominal pain, shortness of breath. States he had TDAP recently. Pt was not down for long as wife was upstairs when he fell off the bed and called EMS right away.     ED  Vital Signs  T(F):Max: 97.9   HR: 76 - 110  BP: 100/61 - 102/62  RR: 18   SpO2: 94% - 96% on room air     Labs significant for cr 1.8 (baseline ~ 1.2)  trop 0.10  Admitted to floor, seen by HF team, started on torsemide, on  patient found minimally responsive, BS 90, lactate 6.1, pO2 44 on abg, repeat CXR consistent with volume overload  - infectious workup sent, repeat BNP increase to 30k, Cr 2.5>3.2, ekg unchanged paced tachycardia   - s/p 250cc IVF  - upgraded to CEU called to evaluate, this am on NRM, agitated, not following commands, tachypneic            PAST MEDICAL & SURGICAL HISTORY:  Status post percutaneous transluminal coronary angioplasty  2 stents      Atherosclerosis of coronary artery  CAD (coronary artery disease)      Osteoarthritis      HLD (hyperlipidemia)      Diabetes  on insulin pump      CHF (congestive heart failure)  EF ~ 25%      History of celiac disease      Diverticulitis      STEMI (ST elevation myocardial infarction)      Diabetic neuropathy  Hands and Feet      Anxiety and depression      Other postprocedural status  Fixation hardware in foot LEFT      Stented coronary artery  10/18 heart attack  INFERIOR WALL MI      S/P CABG x 1  2018      S/P TKR (total knee replacement), right  with infection Mrsa   per pt he was cleared from MRSA infection      Surgery, elective  right knee wound debridement      History of open reduction and internal fixation (ORIF) procedure  right hip      H/O shoulder surgery  right      AICD (automatic cardioverter/defibrillator) present  [facility]      Cholecystostomy care  drain inserted  & removed 4 months ago      History of tonsillectomy      History of hip replacement, total, right      Elective surgery  plastic surgery Left shin          SOCIAL HX:   Smoking    uto    FAMILY HISTORY:  Family history of heart disease (Mother)          REVIEW OF SYSTEMS uto    Allergies    ACE inhibitors (Rash)  Entresto (Swelling)  Atorvastatin Calcium (Unknown)  Bactrim (Unknown)  Plavix (Unknown)    Intolerances        acetaminophen     Tablet .. 325 milliGRAM(s) Oral every 4 hours PRN  aspirin enteric coated 81 milliGRAM(s) Oral daily  buMETAnide Injectable 2 milliGRAM(s) IV Push two times a day  cefepime   IVPB 2000 milliGRAM(s) IV Intermittent every 24 hours  chlorhexidine 0.12% Liquid 15 milliLiter(s) Oral Mucosa every 12 hours  chlorhexidine 2% Cloths 1 Application(s) Topical daily  dexMEDEtomidine Infusion 0.2 MICROgram(s)/kG/Hr IV Continuous <Continuous>  dextrose 5%. 1000 milliLiter(s) IV Continuous <Continuous>  dextrose 5%. 1000 milliLiter(s) IV Continuous <Continuous>  dextrose 50% Injectable 25 Gram(s) IV Push once  dextrose 50% Injectable 12.5 Gram(s) IV Push once  dextrose 50% Injectable 25 Gram(s) IV Push once  dextrose 50% Injectable 25 milliLiter(s) IV Push once  dextrose Oral Gel 15 Gram(s) Oral once PRN  DULoxetine 30 milliGRAM(s) Oral <User Schedule>  fentaNYL   Infusion. 0.5 MICROgram(s)/kG/Hr IV Continuous <Continuous>  folic acid 1 milliGRAM(s) Oral <User Schedule>  glucagon  Injectable 1 milliGRAM(s) IntraMuscular once  heparin   Injectable 5000 Unit(s) SubCutaneous every 12 hours  hydrocortisone 2.5% Cream 1 Application(s) Topical two times a day  insulin lispro (ADMELOG) corrective regimen sliding scale   SubCutaneous three times a day before meals  lactulose Syrup 10 Gram(s) Oral at bedtime  levothyroxine 25 MICROGram(s) Oral <User Schedule>  levothyroxine 50 MICROGram(s) Oral <User Schedule>  magnesium sulfate  IVPB 2 Gram(s) IV Intermittent once  magnesium sulfate  IVPB 2 Gram(s) IV Intermittent once  methotrexate (Non - oncologic) 10 milliGRAM(s) Oral every week  metoprolol succinate ER 25 milliGRAM(s) Oral daily  midodrine. 10 milliGRAM(s) Oral three times a day  milrinone Infusion 0.125 MICROgram(s)/kG/Min IV Continuous <Continuous>  norepinephrine Infusion 0.05 MICROgram(s)/kG/Min IV Continuous <Continuous>  oxyCODONE    IR 5 milliGRAM(s) Oral every 6 hours PRN  pantoprazole    Tablet 40 milliGRAM(s) Oral before breakfast  polyethylene glycol 3350 17 Gram(s) Oral two times a day  prasugrel 10 milliGRAM(s) Oral daily  senna 2 Tablet(s) Oral at bedtime  vancomycin  IVPB 500 milliGRAM(s) IV Intermittent every 8 hours  : Home Meds:      PHYSICAL EXAM    ICU Vital Signs Last 24 Hrs  T(C): 37.4 (2023 13:25), Max: 37.4 (2023 13:25)  T(F): 99.3 (2023 13:25), Max: 99.3 (2023 13:25)  HR: 87 (2023 14:00) (86 - 113)  BP: 104/59 (2023 14:00) (99/58 - 111/81)  BP(mean): 78 (2023 14:00) (75 - 78)  RR: 18 (2023 14:00) (18 - 20)  SpO2: 100% (2023 14:00) (100% - 100%)    O2 Parameters below as of 2023 15:00  Patient On (Oxygen Delivery Method): ventilator    O2 Concentration (%): 40        General: ill looking, cachectic, tachypneic  Lungs: dec bs both bases  Cardiovascular: YULISA 2.6  Abdomen: Soft, Positive BS  Extremities: No clubbing  AKA      23 @ 07:01  -  23 @ 15:04  --------------------------------------------------------  IN:    Dexmedetomidine: 3.4 mL    FentaNYL: 19.8 mL    Milrinone: 4.8 mL    Norepinephrine: 12.4 mL  Total IN: 40.4 mL    OUT:    Indwelling Catheter - Urethral (mL): 1000 mL  Total OUT: 1000 mL    Total NET: -959.6 mL          LABS:                          10.6   8.45  )-----------( 208      ( 2023 09:40 )             36.5                                               06-05    147<H>  |  100  |  54<H>  ----------------------------<  210<H>  5.0   |  29  |  3.6<H>    Ca    9.1      2023 09:40  Mg     2.1     06-04    TPro  6.2  /  Alb  3.3<L>  /  TBili  3.1<H>  /  DBili  x   /  AST  152<H>  /  ALT  78<H>  /  AlkPhos  122<H>  06-05      PT/INR - ( 2023 09:40 )   PT: 24.90 sec;   INR: 2.13 ratio         PTT - ( 2023 09:40 )  PTT:28.1 sec                                       Urinalysis Basic - ( 2023 12:15 )    Color: Yellow / Appearance: Turbid / S.020 / pH: x  Gluc: x / Ketone: Negative  / Bili: Negative / Urobili: <2 mg/dL   Blood: x / Protein: 300 mg/dL / Nitrite: Positive   Leuk Esterase: Large / RBC: 20 /HPF / WBC >720 /HPF   Sq Epi: x / Non Sq Epi: x / Bacteria: Moderate        CARDIAC MARKERS ( 2023 09:40 )  x     / 0.23 ng/mL / x     / x     / x                                                LIVER FUNCTIONS - ( 2023 09:40 )  Alb: 3.3 g/dL / Pro: 6.2 g/dL / ALK PHOS: 122 U/L / ALT: 78 U/L / AST: 152 U/L / GGT: x                                                                                               Mode: AC/ CMV (Assist Control/ Continuous Mandatory Ventilation)  RR (machine): 18  TV (machine): 400  FiO2: 40  PEEP: 8  ITime: 1  MAP: 10  PIP: 22                                      ABG - ( 2023 11:37 )  pH, Arterial: 7.47  pH, Blood: x     /  pCO2: 49    /  pO2: 225   / HCO3: 36    / Base Excess: 10.7  /  SaO2: 99.9                X-Rays                                                                                     ECHO    MEDICATIONS  (STANDING):  aspirin enteric coated 81 milliGRAM(s) Oral daily  buMETAnide Injectable 2 milliGRAM(s) IV Push two times a day  cefepime   IVPB 2000 milliGRAM(s) IV Intermittent every 24 hours  chlorhexidine 0.12% Liquid 15 milliLiter(s) Oral Mucosa every 12 hours  chlorhexidine 2% Cloths 1 Application(s) Topical daily  dexMEDEtomidine Infusion 0.2 MICROgram(s)/kG/Hr (3.25 mL/Hr) IV Continuous <Continuous>  dextrose 5%. 1000 milliLiter(s) (50 mL/Hr) IV Continuous <Continuous>  dextrose 5%. 1000 milliLiter(s) (100 mL/Hr) IV Continuous <Continuous>  dextrose 50% Injectable 25 Gram(s) IV Push once  dextrose 50% Injectable 12.5 Gram(s) IV Push once  dextrose 50% Injectable 25 milliLiter(s) IV Push once  dextrose 50% Injectable 25 Gram(s) IV Push once  DULoxetine 30 milliGRAM(s) Oral <User Schedule>  fentaNYL   Infusion. 0.5 MICROgram(s)/kG/Hr (3.25 mL/Hr) IV Continuous <Continuous>  folic acid 1 milliGRAM(s) Oral <User Schedule>  glucagon  Injectable 1 milliGRAM(s) IntraMuscular once  heparin   Injectable 5000 Unit(s) SubCutaneous every 12 hours  hydrocortisone 2.5% Cream 1 Application(s) Topical two times a day  insulin lispro (ADMELOG) corrective regimen sliding scale   SubCutaneous three times a day before meals  lactulose Syrup 10 Gram(s) Oral at bedtime  levothyroxine 25 MICROGram(s) Oral <User Schedule>  levothyroxine 50 MICROGram(s) Oral <User Schedule>  magnesium sulfate  IVPB 2 Gram(s) IV Intermittent once  magnesium sulfate  IVPB 2 Gram(s) IV Intermittent once  methotrexate (Non - oncologic) 10 milliGRAM(s) Oral every week  metoprolol succinate ER 25 milliGRAM(s) Oral daily  midodrine. 10 milliGRAM(s) Oral three times a day  milrinone Infusion 0.125 MICROgram(s)/kG/Min (2.44 mL/Hr) IV Continuous <Continuous>  norepinephrine Infusion 0.05 MICROgram(s)/kG/Min (6.09 mL/Hr) IV Continuous <Continuous>  pantoprazole    Tablet 40 milliGRAM(s) Oral before breakfast  polyethylene glycol 3350 17 Gram(s) Oral two times a day  prasugrel 10 milliGRAM(s) Oral daily  senna 2 Tablet(s) Oral at bedtime  vancomycin  IVPB 500 milliGRAM(s) IV Intermittent every 8 hours    MEDICATIONS  (PRN):  acetaminophen     Tablet .. 325 milliGRAM(s) Oral every 4 hours PRN Moderate Pain (4 - 6)  dextrose Oral Gel 15 Gram(s) Oral once PRN Blood Glucose LESS THAN 70 milliGRAM(s)/deciliter  oxyCODONE    IR 5 milliGRAM(s) Oral every 6 hours PRN Pain 4-10

## 2023-06-06 NOTE — PROGRESS NOTE ADULT - ASSESSMENT
67y M pmh HFrEF 15-20% s/p ICD, decreased RV function, mild MR/TR, DM type I w/ neuropathy, hx hypoglycemia , DLD, HTN, CAD, hx MI, s/p CABG, s/p stent (2018), hx in-stent thrombosis while on Plavix, PAD s/p 3 LLE stents, TIAGO (needs CPAP auto-regulating pressure 10-16, patient doesn't use it due to claustrophobia), Psoriasis, R-AKA presenting to ED s/p fall admitted for trauma w/u c/b acute hypoxic respiratory failure 2/2 septic vs cardiogenic shock.     Acute hypoxemic/hypercapnia respiratory failure   Septic/Cardiogenic Shock   UTI  HFrEF s/p ICD   CAD s/p PCI  Lactic acidosis  MOISES on CKD  Transaminitis  Iron deficiency anemia   DMI w/ neuropathy  Psoriasis  PVD s/p R AKA, LLE stenting x3  s/p mechanical fall  Hx LLE in-stent thrombosis while on Plavix  TIAGO      PLAN:    CNS:   - sedated on fentanyl - attempt to wean   - SAT, CTH, EEG    HEENT:  Oral care  - feeding via OG tube     PULMONARY  - HOB @ 45 degrees, keep Sao2 92 to 96%  - f/u repeat ABG  - if SAT successful and CTH/EEG -ve will attempt SBT tomorrow     CARDIOVASCULAR:   - TTE 5/24: EF 20%, global cardiomyopathy, mod MR, mod-sev TR  - CVP 3 overnight, bumex on hold s/p 500cc bolus, lactate wnl   - trop 0.23>0.16  - milrinone d/c, will assess cardiac status in AM   - metoprolol d/c while on pressure support   - off midodrine  - c/w levo 0.07 - wean as tolerated   - strict I/Os     GI  - start OG tube feeds  - LFTs unchanged, , AST/ALT 93/87  - trend LFTS  - c/w lactulose 10mg qhs, senna 2 tab qhs, miralax bid  - GI PPX 40mg qD    RENAL  - Cr 1.1>3.5 stable today, reported bsl CKD III  - UA +ve protein, nitrite, LE, blood, wbc, bacteria  - reduced uop 2/2 suspected overdiuresis   - pending RBUS  - trend BMP qD, correct electrolytes as needed  - strict I/Os c/w fields     INFECTIOUS DISEASE  - vancomycin level 6/6 10.3 - holding 2/2 worsening renal fxn  - MRSA nares +ve  - UA positive, Bcx -ve 6/4, pending UCx   - c/w cefepime 2g q24hr    HEMATOLOGICAL  - c/w heparin 5k BID  - PAD LLE, can get LE venous/arterial duplex  - c/w prasugrel 10mg po qD, ASA 81mg qD  - hgb stable ~11, mcv 75, serum iron low, %sat low, ferritin wnl s/p venofer x5d  - c/w folate 1mg S/M/T/Th/F/Sa    ENDOCRINE:   - FS goal 140-180, c/w ISS, restart insulin once on po feeding   - c/w levothyroxine 25mcg M/T/W/Th/F/Sa, 50mcg Dueñas  - c/w MTX 10mg qW    # To follow up  - ID f/u, f/u UCx  - recheck hemodynamics now off milrinone, wean levo as tolerated  - f/u RBUS, trend CMP for Cr/LFTs  - duplex LLE   - SAT, wean sedation     # Misc  - DVT Prophylaxis: heparin   Injectable  - GI Prophylaxis: pantoprazole    Tablet 40 milliGRAM(s) Oral before breakfast  - Diet: Diet, NPO  - Activity: Activity - IAT  - Code Status: Full     Alli John  PGY1, Internal Medicine   [facility]         67y M pmh HFrEF 15-20% s/p ICD, decreased RV function, mild MR/TR, DM type I w/ neuropathy, hx hypoglycemia , DLD, HTN, CAD, hx MI, s/p CABG, s/p stent (2018), hx in-stent thrombosis while on Plavix, PAD s/p 3 LLE stents, TIAGO (needs CPAP auto-regulating pressure 10-16, patient doesn't use it due to claustrophobia), Psoriasis, R-AKA presenting to ED s/p fall admitted for trauma w/u c/b acute hypoxic respiratory failure 2/2 septic vs cardiogenic shock.     Acute hypoxemic/hypercapnia respiratory failure   Septic/Cardiogenic Shock   UTI  HFrEF s/p ICD   CAD s/p PCI  Lactic acidosis  MOISES on CKD  Transaminitis  Iron deficiency anemia   DMI w/ neuropathy  Psoriasis  PVD s/p R AKA, LLE stenting x3  s/p mechanical fall  Hx LLE in-stent thrombosis while on Plavix  TIAGO      PLAN:    CNS:   - sedated on fentanyl for sedation - attempt to wean   - SAT, CTH, EEG    HEENT:  Oral care  - feeding via OG tube     PULMONARY  - HOB @ 45 degrees, keep Sao2 92 to 96%  - f/u repeat ABG  - if SAT successful and CTH/EEG -ve will attempt SBT tomorrow     CARDIOVASCULAR:   - TTE 5/24: EF 20%, global cardiomyopathy, mod MR, mod-sev TR  - CVP 3 overnight, bumex on hold s/p 500cc bolus, lactate wnl   - trop 0.23>0.16  - milrinone d/c, will assess cardiac status in AM   - metoprolol d/c while on pressure support   - off midodrine  - c/w levo 0.07 - wean as tolerated   - strict I/Os     GI  - start OG tube feeds  - LFTs unchanged, , AST/ALT 93/87  - trend LFTS  - c/w lactulose 10mg qhs, senna 2 tab qhs, miralax bid  - GI PPX 40mg qD    RENAL  - Cr 1.1>3.5 stable today, reported bsl CKD III  - UA +ve protein, nitrite, LE, blood, wbc, bacteria  - reduced uop 2/2 suspected overdiuresis   - pending RBUS  - trend BMP qD, correct electrolytes as needed  - strict I/Os c/w fields     INFECTIOUS DISEASE  - vancomycin level 6/6 10.3 - holding 2/2 worsening renal fxn  - MRSA nares +ve  - UA positive, Bcx -ve 6/4, pending UCx   - c/w cefepime 2g q24hr    HEMATOLOGICAL  - c/w heparin 5k BID  - PAD LLE, can get LE venous/arterial duplex  - c/w prasugrel 10mg po qD, ASA 81mg qD  - hgb stable ~11, mcv 75, serum iron low, %sat low, ferritin wnl s/p venofer x5d  - c/w folate 1mg S/M/T/Th/F/Sa    ENDOCRINE:   - FS goal 140-180, c/w ISS, restart insulin once on po feeding   - c/w levothyroxine 25mcg M/T/W/Th/F/Sa, 50mcg Dueñas  - c/w MTX 10mg qW    # To follow up  - ID f/u, f/u UCx  - recheck hemodynamics now off milrinone, wean levo as tolerated  - f/u RBUS, trend CMP for Cr/LFTs  - duplex LLE   - SAT, wean sedation     # Misc  - DVT Prophylaxis: heparin   Injectable  - GI Prophylaxis: pantoprazole    Tablet 40 milliGRAM(s) Oral before breakfast  - Diet: Diet, NPO  - Activity: Activity - IAT  - Code Status: Full

## 2023-06-06 NOTE — PROGRESS NOTE ADULT - ASSESSMENT
IMPRESSION:    Acute hypoxemic and hypercapnia resp failure on NRM  Pul edema improved   HFrEF  Lactic acidosis  septic/ cardiogenic shock   Acute renal failure  possible bacterial superinfection  DM  Psoriasis  PVD  TIAGO  UTI   sepsis       PLAN:    CNS: consider  head CT, EEG  do SAT   HEENT:  Oral care    PULMONARY:  HOB @ 45 degrees, ABG will need intubation, keep Sao2 92 to 96%  increase rate to 20     CARDIOVASCULAR: cardio fu, pressors/ inotropes as per cardiology trend LA  keep is = os   off diuretics   GI: GI prophylaxis                                          Feeding NPO    RENAL:  F/u  lytes.  Correct as needed. accurate I/O, fields, renal US    INFECTIOUS DISEASE: cefepime, procal, pancx, fungitell    HEMATOLOGICAL:  DVT prophylaxis. LE doppler    ENDOCRINE:  Follow up FS.  Insulin protocol if needed. cortisol level      very poor prognosis    MICU

## 2023-06-06 NOTE — PROGRESS NOTE ADULT - SUBJECTIVE AND OBJECTIVE BOX
HPI:  67-year-old male w/ HFrEF 15-20% s/p ICD, decreased RV function, mild MR/TR, DM type I w/ neuropathy and hx hypoglycemia , DL, HTN, CAD, hx MI, s/p CABG, s/p stent (2018), hx in-stent thrombosis while on Plavix, PAD s/p 3 LLE stents, TIAGO (needs CPAP auto-regulating pressure 10-16, patient doesn't use it due to claustrophobia), Psoriasis, R-AKA presenting to ED s/p fall. He states he was lying in bed at home when he fell asleep and fell off the bed, and complains of generalized headache neck pain, non-radiating, no alleviating or aggravating factors, associated with right anterior chest wall pain. Denies LOC,  fevers, chills, nausea, vomiting, dizziness, abdominal pain, shortness of breath. States he had TDAP recently. Pt was not down for long as wife was upstairs when he fell off the bed and called EMS right away.     ED  Vital Signs  T(F):Max: 97.9   HR: 76 - 110  BP: 100/61 - 102/62  RR: 18   SpO2: 94% - 96% on room air     Labs significant for cr 1.8 (baseline ~ 1.2)  trop 0.10             (18 May 2023 14:16)    Currently admitted to medicine with the primary diagnosis of Acute HF (heart failure)       Today is hospital day 19d.     INTERVAL HPI / OVERNIGHT EVENTS:  Patient was examined and seen at bedside. This morning he is resting comfortably in bed and reports no new issues or overnight events. Vitals stable, patient tolerating oral diet, voiding appropriately, having regular BMs. Will continue to monitor.     ROS: Otherwise unremarkable     PAST MEDICAL & SURGICAL HISTORY  Status post percutaneous transluminal coronary angioplasty  2 stents    Atherosclerosis of coronary artery  CAD (coronary artery disease)    Osteoarthritis    HLD (hyperlipidemia)    Diabetes  on insulin pump    CHF (congestive heart failure)  EF ~ 25%    History of celiac disease    Diverticulitis    STEMI (ST elevation myocardial infarction)    Diabetic neuropathy  Hands and Feet    Anxiety and depression    Other postprocedural status  Fixation hardware in foot LEFT    Stented coronary artery  10/18 heart attack  INFERIOR WALL MI    S/P CABG x 1      S/P TKR (total knee replacement), right  with infection Mrsa   per pt he was cleared from MRSA infection    Surgery, elective  right knee wound debridement    History of open reduction and internal fixation (ORIF) procedure  right hip    H/O shoulder surgery  right    AICD (automatic cardioverter/defibrillator) present  [facility]    Cholecystostomy care  drain inserted  & removed 4 months ago    History of tonsillectomy    History of hip replacement, total, right    Elective surgery  plastic surgery Left shin      ALLERGIES  ACE inhibitors (Rash)  Entresto (Swelling)  Atorvastatin Calcium (Unknown)  Bactrim (Unknown)  Plavix (Unknown)    MEDICATIONS  STANDING MEDICATIONS  aspirin enteric coated 81 milliGRAM(s) Oral daily  cefepime   IVPB 2000 milliGRAM(s) IV Intermittent every 24 hours  chlorhexidine 0.12% Liquid 15 milliLiter(s) Oral Mucosa every 12 hours  chlorhexidine 2% Cloths 1 Application(s) Topical daily  dexMEDEtomidine Infusion 0.2 MICROgram(s)/kG/Hr IV Continuous <Continuous>  dextrose 5%. 1000 milliLiter(s) IV Continuous <Continuous>  dextrose 5%. 1000 milliLiter(s) IV Continuous <Continuous>  dextrose 50% Injectable 12.5 Gram(s) IV Push once  dextrose 50% Injectable 25 Gram(s) IV Push once  dextrose 50% Injectable 25 Gram(s) IV Push once  dextrose 50% Injectable 25 milliLiter(s) IV Push once  DULoxetine 30 milliGRAM(s) Oral <User Schedule>  fentaNYL   Infusion. 0.5 MICROgram(s)/kG/Hr IV Continuous <Continuous>  folic acid 1 milliGRAM(s) Oral <User Schedule>  glucagon  Injectable 1 milliGRAM(s) IntraMuscular once  heparin   Injectable 5000 Unit(s) SubCutaneous every 12 hours  hydrocortisone 2.5% Cream 1 Application(s) Topical two times a day  insulin lispro (ADMELOG) corrective regimen sliding scale   SubCutaneous three times a day before meals  lactulose Syrup 10 Gram(s) Oral at bedtime  levothyroxine 50 MICROGram(s) Oral <User Schedule>  levothyroxine 25 MICROGram(s) Oral <User Schedule>  magnesium sulfate  IVPB 2 Gram(s) IV Intermittent once  magnesium sulfate  IVPB 2 Gram(s) IV Intermittent once  methotrexate (Non - oncologic) 10 milliGRAM(s) Oral every week  norepinephrine Infusion 0.05 MICROgram(s)/kG/Min IV Continuous <Continuous>  pantoprazole    Tablet 40 milliGRAM(s) Oral before breakfast  polyethylene glycol 3350 17 Gram(s) Oral two times a day  prasugrel 10 milliGRAM(s) Oral daily  senna 2 Tablet(s) Oral at bedtime  sodium chloride 0.9% Bolus 250 milliLiter(s) IV Bolus once    PRN MEDICATIONS  acetaminophen     Tablet .. 325 milliGRAM(s) Oral every 4 hours PRN  dextrose Oral Gel 15 Gram(s) Oral once PRN  oxyCODONE    IR 5 milliGRAM(s) Oral every 6 hours PRN    VITALS:  T(F): 96.1  HR: 105  BP: 90/53  RR: 18  SpO2: 100%    PHYSICAL EXAM  GEN: NAD, Resting comfortably in bed, cooperative  PULM: Clear to auscultation bilaterally, No wheezing, rales, rhonchi  CVS: Regular rate and rhythm, S1-S2, no murmurs, heaves, thrills  ABD: Soft, non-tender, non-distended, no guarding, BS +  EXT: No edema/cyanosis, extremities warm/dry, peripheral pulses palpable   NEURO: A&Ox3, no focal deficits    LABS                        10.9   12.21 )-----------( 183      ( 2023 04:43 )             38.0     06-06    147<H>  |  101  |  61<HH>  ----------------------------<  204<H>  4.1   |  26  |  3.5<H>    Ca    9.3      2023 04:43  Phos  5.8     06-06  Mg     2.3     06-06    TPro  6.2  /  Alb  3.3<L>  /  TBili  2.6<H>  /  DBili  x   /  AST  93<H>  /  ALT  87<H>  /  AlkPhos  124<H>  06-06    PT/INR - ( 2023 09:40 )   PT: 24.90 sec;   INR: 2.13 ratio         PTT - ( 2023 09:40 )  PTT:28.1 sec  Urinalysis Basic - ( 2023 12:15 )    Color: Yellow / Appearance: Turbid / S.020 / pH: x  Gluc: x / Ketone: Negative  / Bili: Negative / Urobili: <2 mg/dL   Blood: x / Protein: 300 mg/dL / Nitrite: Positive   Leuk Esterase: Large / RBC: 20 /HPF / WBC >720 /HPF   Sq Epi: x / Non Sq Epi: x / Bacteria: Moderate      ABG - ( 2023 04:30 )  pH, Arterial: 7.33  pH, Blood: x     /  pCO2: 54    /  pO2: 178   / HCO3: 28    / Base Excess: 1.5   /  SaO2: 99.8              Lactate, Blood: 1.6 mmol/L (23 @ 04:43)  Lactate, Blood: 2.0 mmol/L (23 @ 17:41)  Troponin T, Serum: 0.16 ng/mL *HH* (23 @ 17:41)  Lactate, Blood: 4.6 mmol/L *HH* (23 @ 09:40)  Troponin T, Serum: 0.23 ng/mL *HH* (23 @ 09:40)      Culture - Blood (collected 2023 11:50)  Source: .Blood Blood  Preliminary Report (2023 16:02):    No growth to date.      CARDIAC MARKERS ( 2023 17:41 )  x     / 0.16 ng/mL / x     / x     / x      CARDIAC MARKERS ( 2023 09:40 )  x     / 0.23 ng/mL / x     / x     / x            RADIOLOGY     HPI:  67-year-old male w/ HFrEF 15-20% s/p ICD, decreased RV function, mild MR/TR, DM type I w/ neuropathy and hx hypoglycemia , DL, HTN, CAD, hx MI, s/p CABG, s/p stent (2018), hx in-stent thrombosis while on Plavix, PAD s/p 3 LLE stents, TIAGO (needs CPAP auto-regulating pressure 10-16, patient doesn't use it due to claustrophobia), Psoriasis, R-AKA presenting to ED s/p fall. He states he was lying in bed at home when he fell asleep and fell off the bed, and complains of generalized headache neck pain, non-radiating, no alleviating or aggravating factors, associated with right anterior chest wall pain. Denies LOC,  fevers, chills, nausea, vomiting, dizziness, abdominal pain, shortness of breath. States he had TDAP recently. Pt was not down for long as wife was upstairs when he fell off the bed and called EMS right away.     ED  Vital Signs  T(F):Max: 97.9   HR: 76 - 110  BP: 100/61 - 102/62  RR: 18   SpO2: 94% - 96% on room air     Labs significant for cr 1.8 (baseline ~ 1.2)  trop 0.10             (18 May 2023 14:16)    Currently admitted to medicine with the primary diagnosis of Acute HF (heart failure)       Today is hospital day 19d.     INTERVAL HPI / OVERNIGHT EVENTS:  Patient was examined and seen at bedside. This morning he in bed, sedated/intubated, no events overnight. Hemodynically stable on pressure support, diet via OG tube, reduced UOP ovn, having regular BMs.     ROS: Otherwise unremarkable     PAST MEDICAL & SURGICAL HISTORY  Status post percutaneous transluminal coronary angioplasty  2 stents    Atherosclerosis of coronary artery  CAD (coronary artery disease)    Osteoarthritis    HLD (hyperlipidemia)    Diabetes  on insulin pump    CHF (congestive heart failure)  EF ~ 25%    History of celiac disease    Diverticulitis    STEMI (ST elevation myocardial infarction)    Diabetic neuropathy  Hands and Feet    Anxiety and depression    Other postprocedural status  Fixation hardware in foot LEFT    Stented coronary artery  10/18 heart attack  INFERIOR WALL MI    S/P CABG x 1      S/P TKR (total knee replacement), right  with infection Mrsa   per pt he was cleared from MRSA infection    Surgery, elective  right knee wound debridement    History of open reduction and internal fixation (ORIF) procedure  right hip    H/O shoulder surgery  right    AICD (automatic cardioverter/defibrillator) present  [facility]    Cholecystostomy care  drain inserted  & removed 4 months ago    History of tonsillectomy    History of hip replacement, total, right    Elective surgery  plastic surgery Left shin      ALLERGIES  ACE inhibitors (Rash)  Entresto (Swelling)  Atorvastatin Calcium (Unknown)  Bactrim (Unknown)  Plavix (Unknown)    MEDICATIONS  STANDING MEDICATIONS  aspirin enteric coated 81 milliGRAM(s) Oral daily  cefepime   IVPB 2000 milliGRAM(s) IV Intermittent every 24 hours  chlorhexidine 0.12% Liquid 15 milliLiter(s) Oral Mucosa every 12 hours  chlorhexidine 2% Cloths 1 Application(s) Topical daily  dexMEDEtomidine Infusion 0.2 MICROgram(s)/kG/Hr IV Continuous <Continuous>  dextrose 5%. 1000 milliLiter(s) IV Continuous <Continuous>  dextrose 5%. 1000 milliLiter(s) IV Continuous <Continuous>  dextrose 50% Injectable 12.5 Gram(s) IV Push once  dextrose 50% Injectable 25 Gram(s) IV Push once  dextrose 50% Injectable 25 Gram(s) IV Push once  dextrose 50% Injectable 25 milliLiter(s) IV Push once  DULoxetine 30 milliGRAM(s) Oral <User Schedule>  fentaNYL   Infusion. 0.5 MICROgram(s)/kG/Hr IV Continuous <Continuous>  folic acid 1 milliGRAM(s) Oral <User Schedule>  glucagon  Injectable 1 milliGRAM(s) IntraMuscular once  heparin   Injectable 5000 Unit(s) SubCutaneous every 12 hours  hydrocortisone 2.5% Cream 1 Application(s) Topical two times a day  insulin lispro (ADMELOG) corrective regimen sliding scale   SubCutaneous three times a day before meals  lactulose Syrup 10 Gram(s) Oral at bedtime  levothyroxine 50 MICROGram(s) Oral <User Schedule>  levothyroxine 25 MICROGram(s) Oral <User Schedule>  magnesium sulfate  IVPB 2 Gram(s) IV Intermittent once  magnesium sulfate  IVPB 2 Gram(s) IV Intermittent once  methotrexate (Non - oncologic) 10 milliGRAM(s) Oral every week  norepinephrine Infusion 0.05 MICROgram(s)/kG/Min IV Continuous <Continuous>  pantoprazole    Tablet 40 milliGRAM(s) Oral before breakfast  polyethylene glycol 3350 17 Gram(s) Oral two times a day  prasugrel 10 milliGRAM(s) Oral daily  senna 2 Tablet(s) Oral at bedtime  sodium chloride 0.9% Bolus 250 milliLiter(s) IV Bolus once    PRN MEDICATIONS  acetaminophen     Tablet .. 325 milliGRAM(s) Oral every 4 hours PRN  dextrose Oral Gel 15 Gram(s) Oral once PRN  oxyCODONE    IR 5 milliGRAM(s) Oral every 6 hours PRN    VITALS:  T(F): 96.1  HR: 105  BP: 90/53  RR: 18  SpO2: 100%    PHYSICAL EXAM  GEN: NAD, sedated, responds to painful stimuli  PULM: Clear breath sounds b/l, No wheezing, rales, rhonchi, intubated on MV 20/400/8/40  CVS: Regular rate and rhythm, S1-S2, no murmurs, heaves, thrills, R IJ swan catheter C/D/I  ABD: Soft, non-tender, non-distended, no guarding, BS +, fields w/ clear yellow urine   EXT: R AKA, LLE erythematous up to mid calf, distal pulse not palpable, no swelling/edema, multiple ischemic/necrotic ulcerations, warm/dry, L femoral udal catheter c/d/i  NEURO: Sedated CONSUELO -2, does not follow commands, responds to painful stimuli     LABS                        10.9   12.21 )-----------( 183      ( 2023 04:43 )             38.0     06-06    147<H>  |  101  |  61<HH>  ----------------------------<  204<H>  4.1   |  26  |  3.5<H>    Ca    9.3      2023 04:43  Phos  5.8     06-06  Mg     2.3     06-06    TPro  6.2  /  Alb  3.3<L>  /  TBili  2.6<H>  /  DBili  x   /  AST  93<H>  /  ALT  87<H>  /  AlkPhos  124<H>  06-06    PT/INR - ( 2023 09:40 )   PT: 24.90 sec;   INR: 2.13 ratio         PTT - ( 2023 09:40 )  PTT:28.1 sec  Urinalysis Basic - ( 2023 12:15 )    Color: Yellow / Appearance: Turbid / S.020 / pH: x  Gluc: x / Ketone: Negative  / Bili: Negative / Urobili: <2 mg/dL   Blood: x / Protein: 300 mg/dL / Nitrite: Positive   Leuk Esterase: Large / RBC: 20 /HPF / WBC >720 /HPF   Sq Epi: x / Non Sq Epi: x / Bacteria: Moderate      ABG - ( 2023 04:30 )  pH, Arterial: 7.33  pH, Blood: x     /  pCO2: 54    /  pO2: 178   / HCO3: 28    / Base Excess: 1.5   /  SaO2: 99.8              Lactate, Blood: 1.6 mmol/L (23 @ 04:43)  Lactate, Blood: 2.0 mmol/L (23 @ 17:41)  Troponin T, Serum: 0.16 ng/mL *HH* (23 @ 17:41)  Lactate, Blood: 4.6 mmol/L *HH* (23 @ 09:40)  Troponin T, Serum: 0.23 ng/mL *HH* (23 @ 09:40)      Culture - Blood (collected 2023 11:50)  Source: .Blood Blood  Preliminary Report (2023 16:02):    No growth to date.      CARDIAC MARKERS ( 2023 17:41 )  x     / 0.16 ng/mL / x     / x     / x      CARDIAC MARKERS ( 2023 09:40 )  x     / 0.23 ng/mL / x     / x     / x            RADIOLOGY  CT C/A/P   No CT evidence for acute traumatic injury within the chest, abdomen or   pelvis. Moderate to large bilateral pleural effusions, right greater than left.   Small to moderate volume abdominopelvic ascites. Cardiomegaly with focal discontinuity anterior left atrial wall, unchanged. Unchanged left renal pelvic dilatation with appearance of left UPJ obstruction.    TTE :   1. Severely decreased global left ventricular systolic function.   2. Multiple left ventricular regional wall motion abnormalities exist.   See wall motion findings.   3. LV Ejection Fraction by Wilkerson's Method with a biplane EF of 20 %.   4. Global cardiomyopathy.   5. Normal left ventricular internal cavity size.   6. Mildly enlarged right ventricle.   7. Moderately reduced RV systolic function.   8. Mildly enlarged right atrium.   9. Moderately enlarged left atrium.  10. There is no evidence of pericardial effusion.  11. Mild thickening and calcification of the anterior and posterior   mitral valve leaflets.  12. Moderate mitral annular calcification.  13. Moderate mitral valve regurgitation.  14. Moderate-severe tricuspid regurgitation.   HPI:  67-year-old male w/ HFrEF 15-20% s/p ICD, decreased RV function, mild MR/TR, DM type I w/ neuropathy and hx hypoglycemia , DL, HTN, CAD, hx MI, s/p CABG, s/p stent (2018), hx in-stent thrombosis while on Plavix, PAD s/p 3 LLE stents, TIAGO (needs CPAP auto-regulating pressure 10-16, patient doesn't use it due to claustrophobia), Psoriasis, R-AKA presenting to ED s/p fall. He states he was lying in bed at home when he fell asleep and fell off the bed, and complains of generalized headache neck pain, non-radiating, no alleviating or aggravating factors, associated with right anterior chest wall pain. Denies LOC,  fevers, chills, nausea, vomiting, dizziness, abdominal pain, shortness of breath. States he had TDAP recently. Pt was not down for long as wife was upstairs when he fell off the bed and called EMS right away.     ED  Vital Signs  T(F):Max: 97.9   HR: 76 - 110  BP: 100/61 - 102/62  RR: 18   SpO2: 94% - 96% on room air     Labs significant for cr 1.8 (baseline ~ 1.2)  trop 0.10    Currently admitted to medicine with the primary diagnosis of Acute HF (heart failure)       Today is hospital day 19d.     INTERVAL HPI / OVERNIGHT EVENTS:  Patient was examined and seen at bedside. This morning he in bed, sedated/intubated, no events overnight. Hemodynically stable on pressure support, diet via OG tube, reduced UOP ovn, having regular BMs.     ROS: Otherwise unremarkable     PAST MEDICAL & SURGICAL HISTORY  Status post percutaneous transluminal coronary angioplasty  2 stents    Atherosclerosis of coronary artery  CAD (coronary artery disease)    Osteoarthritis    HLD (hyperlipidemia)    Diabetes  on insulin pump    CHF (congestive heart failure)  EF ~ 25%    History of celiac disease    Diverticulitis    STEMI (ST elevation myocardial infarction)    Diabetic neuropathy  Hands and Feet    Anxiety and depression    Other postprocedural status  Fixation hardware in foot LEFT    Stented coronary artery  10/18 heart attack  INFERIOR WALL MI    S/P CABG x 1      S/P TKR (total knee replacement), right  with infection Mrsa   per pt he was cleared from MRSA infection    Surgery, elective  right knee wound debridement    History of open reduction and internal fixation (ORIF) procedure  right hip    H/O shoulder surgery  right    AICD (automatic cardioverter/defibrillator) present  [facility]    Cholecystostomy care  drain inserted 2020 & removed 4 months ago    History of tonsillectomy    History of hip replacement, total, right    Elective surgery  plastic surgery Left shin      ALLERGIES  ACE inhibitors (Rash)  Entresto (Swelling)  Atorvastatin Calcium (Unknown)  Bactrim (Unknown)  Plavix (Unknown)    MEDICATIONS  STANDING MEDICATIONS  aspirin enteric coated 81 milliGRAM(s) Oral daily  cefepime   IVPB 2000 milliGRAM(s) IV Intermittent every 24 hours  chlorhexidine 0.12% Liquid 15 milliLiter(s) Oral Mucosa every 12 hours  chlorhexidine 2% Cloths 1 Application(s) Topical daily  dexMEDEtomidine Infusion 0.2 MICROgram(s)/kG/Hr IV Continuous <Continuous>  dextrose 5%. 1000 milliLiter(s) IV Continuous <Continuous>  dextrose 5%. 1000 milliLiter(s) IV Continuous <Continuous>  dextrose 50% Injectable 12.5 Gram(s) IV Push once  dextrose 50% Injectable 25 Gram(s) IV Push once  dextrose 50% Injectable 25 Gram(s) IV Push once  dextrose 50% Injectable 25 milliLiter(s) IV Push once  DULoxetine 30 milliGRAM(s) Oral <User Schedule>  fentaNYL   Infusion. 0.5 MICROgram(s)/kG/Hr IV Continuous <Continuous>  folic acid 1 milliGRAM(s) Oral <User Schedule>  glucagon  Injectable 1 milliGRAM(s) IntraMuscular once  heparin   Injectable 5000 Unit(s) SubCutaneous every 12 hours  hydrocortisone 2.5% Cream 1 Application(s) Topical two times a day  insulin lispro (ADMELOG) corrective regimen sliding scale   SubCutaneous three times a day before meals  lactulose Syrup 10 Gram(s) Oral at bedtime  levothyroxine 50 MICROGram(s) Oral <User Schedule>  levothyroxine 25 MICROGram(s) Oral <User Schedule>  magnesium sulfate  IVPB 2 Gram(s) IV Intermittent once  magnesium sulfate  IVPB 2 Gram(s) IV Intermittent once  methotrexate (Non - oncologic) 10 milliGRAM(s) Oral every week  norepinephrine Infusion 0.05 MICROgram(s)/kG/Min IV Continuous <Continuous>  pantoprazole    Tablet 40 milliGRAM(s) Oral before breakfast  polyethylene glycol 3350 17 Gram(s) Oral two times a day  prasugrel 10 milliGRAM(s) Oral daily  senna 2 Tablet(s) Oral at bedtime  sodium chloride 0.9% Bolus 250 milliLiter(s) IV Bolus once    PRN MEDICATIONS  acetaminophen     Tablet .. 325 milliGRAM(s) Oral every 4 hours PRN  dextrose Oral Gel 15 Gram(s) Oral once PRN  oxyCODONE    IR 5 milliGRAM(s) Oral every 6 hours PRN    VITALS:  T(F): 96.1  HR: 105  BP: 90/53  RR: 18  SpO2: 100%    PHYSICAL EXAM  GEN: NAD, sedated, responds to painful stimuli  PULM: Clear breath sounds b/l, No wheezing, rales, rhonchi, intubated on MV 20/400/8/40  CVS: Regular rate and rhythm, S1-S2, no murmurs, heaves, thrills, R IJ swan catheter C/D/I  ABD: Soft, non-tender, non-distended, no guarding, BS +, fields w/ clear yellow urine   EXT: R AKA, LLE erythematous up to mid calf, distal pulse not palpable, no swelling/edema, multiple ischemic/necrotic ulcerations, warm/dry, L femoral udal catheter c/d/i  NEURO: Sedated CONSUELO -2, does not follow commands, responds to painful stimuli     LABS                        10.9   12.21 )-----------( 183      ( 2023 04:43 )             38.0     06-06    147<H>  |  101  |  61<HH>  ----------------------------<  204<H>  4.1   |  26  |  3.5<H>    Ca    9.3      2023 04:43  Phos  5.8     06-06  Mg     2.3     06-06    TPro  6.2  /  Alb  3.3<L>  /  TBili  2.6<H>  /  DBili  x   /  AST  93<H>  /  ALT  87<H>  /  AlkPhos  124<H>  06-06    PT/INR - ( 2023 09:40 )   PT: 24.90 sec;   INR: 2.13 ratio         PTT - ( 2023 09:40 )  PTT:28.1 sec  Urinalysis Basic - ( 2023 12:15 )    Color: Yellow / Appearance: Turbid / S.020 / pH: x  Gluc: x / Ketone: Negative  / Bili: Negative / Urobili: <2 mg/dL   Blood: x / Protein: 300 mg/dL / Nitrite: Positive   Leuk Esterase: Large / RBC: 20 /HPF / WBC >720 /HPF   Sq Epi: x / Non Sq Epi: x / Bacteria: Moderate      ABG - ( 2023 04:30 )  pH, Arterial: 7.33  pH, Blood: x     /  pCO2: 54    /  pO2: 178   / HCO3: 28    / Base Excess: 1.5   /  SaO2: 99.8              Lactate, Blood: 1.6 mmol/L (23 @ 04:43)  Lactate, Blood: 2.0 mmol/L (23 @ 17:41)  Troponin T, Serum: 0.16 ng/mL *HH* (23 @ 17:41)  Lactate, Blood: 4.6 mmol/L *HH* (23 @ 09:40)  Troponin T, Serum: 0.23 ng/mL *HH* (23 @ 09:40)      Culture - Blood (collected 2023 11:50)  Source: .Blood Blood  Preliminary Report (2023 16:02):    No growth to date.      CARDIAC MARKERS ( 2023 17:41 )  x     / 0.16 ng/mL / x     / x     / x      CARDIAC MARKERS ( 2023 09:40 )  x     / 0.23 ng/mL / x     / x     / x            RADIOLOGY  CT C/A/P   No CT evidence for acute traumatic injury within the chest, abdomen or   pelvis. Moderate to large bilateral pleural effusions, right greater than left.   Small to moderate volume abdominopelvic ascites. Cardiomegaly with focal discontinuity anterior left atrial wall, unchanged. Unchanged left renal pelvic dilatation with appearance of left UPJ obstruction.    TTE :   1. Severely decreased global left ventricular systolic function.   2. Multiple left ventricular regional wall motion abnormalities exist.   See wall motion findings.   3. LV Ejection Fraction by Wilkerson's Method with a biplane EF of 20 %.   4. Global cardiomyopathy.   5. Normal left ventricular internal cavity size.   6. Mildly enlarged right ventricle.   7. Moderately reduced RV systolic function.   8. Mildly enlarged right atrium.   9. Moderately enlarged left atrium.  10. There is no evidence of pericardial effusion.  11. Mild thickening and calcification of the anterior and posterior   mitral valve leaflets.  12. Moderate mitral annular calcification.  13. Moderate mitral valve regurgitation.  14. Moderate-severe tricuspid regurgitation.

## 2023-06-06 NOTE — PROGRESS NOTE ADULT - SUBJECTIVE AND OBJECTIVE BOX
Patient is a 67y old  Male who presents with a chief complaint of Mechanical fall (2023 15:50)      Over Night Events:  Patient seen and examined.   on vent   on milrinone and levo     ROS:  See HPI    PHYSICAL EXAM    ICU Vital Signs Last 24 Hrs  T(C): 35.6 (2023 04:00), Max: 37.4 (2023 13:25)  T(F): 96.1 (2023 04:00), Max: 99.3 (2023 13:25)  HR: 105 (2023 07:00) (80 - 113)  BP: 90/53 (2023 18:00) (89/52 - 106/56)  BP(mean): 67 (2023 18:00) (67 - 78)  ABP: 117/46 (2023 07:00) (104/45 - 143/52)  ABP(mean): 66 (2023 07:00) (61 - 76)  RR: 18 (2023 07:00) (9 - 22)  SpO2: 100% (2023 07:00) (100% - 100%)    O2 Parameters below as of 2023 07:00  Patient On (Oxygen Delivery Method): ventilator    O2 Concentration (%): 40        General:on sedation   HEENT:  et tube               Lymph Nodes: NO cervical LN   Lungs: Bilateral BS  Cardiovascular: Regular   Abdomen: Soft, Positive BS  Extremities: No clubbing   Skin: warm   Neurological:  positive gag   Musculoskeletal: move all ext     I&O's Detail    2023 07:01  -  2023 07:00  --------------------------------------------------------  IN:    Dexmedetomidine: 3.4 mL    FentaNYL: 210.5 mL    IV PiggyBack: 50 mL    Lactated Ringers Bolus: 250 mL    Milrinone: 49.2 mL    Norepinephrine: 142.4 mL  Total IN: 705.5 mL    OUT:    Indwelling Catheter - Urethral (mL): 1800 mL  Total OUT: 1800 mL    Total NET: -1094.5 mL          LABS:                          10.9   12.21 )-----------( 183      ( 2023 04:43 )             38.0         2023 04:43    147    |  101    |  61     ----------------------------<  204    4.1     |  26     |  3.5      Ca    9.3        2023 04:43  Phos  5.8       2023 04:43  Mg     2.3       2023 04:43    TPro  6.2    /  Alb  3.3    /  TBili  2.6    /  DBili  x      /  AST  93     /  ALT  87     /  AlkPhos  124    2023 04:43  Amylase x     lipase x                                                 PT/INR - ( 2023 09:40 )   PT: 24.90 sec;   INR: 2.13 ratio         PTT - ( 2023 09:40 )  PTT:28.1 sec                                       Urinalysis Basic - ( 2023 12:15 )    Color: Yellow / Appearance: Turbid / S.020 / pH: x  Gluc: x / Ketone: Negative  / Bili: Negative / Urobili: <2 mg/dL   Blood: x / Protein: 300 mg/dL / Nitrite: Positive   Leuk Esterase: Large / RBC: 20 /HPF / WBC >720 /HPF   Sq Epi: x / Non Sq Epi: x / Bacteria: Moderate        Lactate, Blood: 1.6 mmol/L (23 @ 04:43)  Lactate, Blood: 2.0 mmol/L (23 @ 17:41)  Lactate, Blood: 4.6 mmol/L (23 @ 09:40)  Lactate, Blood: 2.9 mmol/L (23 @ 18:31)    CARDIAC MARKERS ( 2023 17:41 )  x     / 0.16 ng/mL / x     / x     / x      CARDIAC MARKERS ( 2023 09:40 )  x     / 0.23 ng/mL / x     / x     / x                                                            Culture - Blood (collected 2023 11:50)  Source: .Blood Blood  Preliminary Report (2023 16:02):    No growth to date.                                                   Mode: AC/ CMV (Assist Control/ Continuous Mandatory Ventilation)  RR (machine): 18  TV (machine): 400  FiO2: 40  PEEP: 8  ITime: 1  MAP: 11  PIP: 23                                      ABG - ( 2023 04:30 )  pH, Arterial: 7.33  pH, Blood: x     /  pCO2: 54    /  pO2: 178   / HCO3: 28    / Base Excess: 1.5   /  SaO2: 99.8                MEDICATIONS  (STANDING):  aspirin enteric coated 81 milliGRAM(s) Oral daily  cefepime   IVPB 2000 milliGRAM(s) IV Intermittent every 24 hours  chlorhexidine 0.12% Liquid 15 milliLiter(s) Oral Mucosa every 12 hours  chlorhexidine 2% Cloths 1 Application(s) Topical daily  dexMEDEtomidine Infusion 0.2 MICROgram(s)/kG/Hr (3.25 mL/Hr) IV Continuous <Continuous>  dextrose 5%. 1000 milliLiter(s) (100 mL/Hr) IV Continuous <Continuous>  dextrose 5%. 1000 milliLiter(s) (50 mL/Hr) IV Continuous <Continuous>  dextrose 50% Injectable 25 Gram(s) IV Push once  dextrose 50% Injectable 25 Gram(s) IV Push once  dextrose 50% Injectable 12.5 Gram(s) IV Push once  dextrose 50% Injectable 25 milliLiter(s) IV Push once  DULoxetine 30 milliGRAM(s) Oral <User Schedule>  fentaNYL   Infusion. 0.5 MICROgram(s)/kG/Hr (3.25 mL/Hr) IV Continuous <Continuous>  folic acid 1 milliGRAM(s) Oral <User Schedule>  glucagon  Injectable 1 milliGRAM(s) IntraMuscular once  heparin   Injectable 5000 Unit(s) SubCutaneous every 12 hours  hydrocortisone 2.5% Cream 1 Application(s) Topical two times a day  insulin lispro (ADMELOG) corrective regimen sliding scale   SubCutaneous three times a day before meals  lactulose Syrup 10 Gram(s) Oral at bedtime  levothyroxine 50 MICROGram(s) Oral <User Schedule>  levothyroxine 25 MICROGram(s) Oral <User Schedule>  magnesium sulfate  IVPB 2 Gram(s) IV Intermittent once  magnesium sulfate  IVPB 2 Gram(s) IV Intermittent once  methotrexate (Non - oncologic) 10 milliGRAM(s) Oral every week  norepinephrine Infusion 0.05 MICROgram(s)/kG/Min (6.09 mL/Hr) IV Continuous <Continuous>  pantoprazole    Tablet 40 milliGRAM(s) Oral before breakfast  polyethylene glycol 3350 17 Gram(s) Oral two times a day  prasugrel 10 milliGRAM(s) Oral daily  senna 2 Tablet(s) Oral at bedtime  sodium chloride 0.9% Bolus 250 milliLiter(s) IV Bolus once    MEDICATIONS  (PRN):  acetaminophen     Tablet .. 325 milliGRAM(s) Oral every 4 hours PRN Moderate Pain (4 - 6)  dextrose Oral Gel 15 Gram(s) Oral once PRN Blood Glucose LESS THAN 70 milliGRAM(s)/deciliter  oxyCODONE    IR 5 milliGRAM(s) Oral every 6 hours PRN Pain 4-10          Xrays:  TLC:  OG:  ET tube:                                                                                    decrease effusion    ECHO:  [facility] ICU:

## 2023-06-06 NOTE — PROGRESS NOTE ADULT - SUBJECTIVE AND OBJECTIVE BOX
Nephrology progress note    Patient was seen and examined, events over the last 24 h noted .    Cr stable    Allergies:  ACE inhibitors (Rash)  Entresto (Swelling)  Atorvastatin Calcium (Unknown)  Bactrim (Unknown)  Plavix (Unknown)    Hospital Medications:   MEDICATIONS  (STANDING):  aspirin enteric coated 81 milliGRAM(s) Oral daily  cefepime   IVPB 2000 milliGRAM(s) IV Intermittent every 24 hours  chlorhexidine 0.12% Liquid 15 milliLiter(s) Oral Mucosa every 12 hours  chlorhexidine 2% Cloths 1 Application(s) Topical daily  dexMEDEtomidine Infusion 0.2 MICROgram(s)/kG/Hr (3.25 mL/Hr) IV Continuous <Continuous>  dextrose 5%. 1000 milliLiter(s) (50 mL/Hr) IV Continuous <Continuous>  dextrose 5%. 1000 milliLiter(s) (100 mL/Hr) IV Continuous <Continuous>  dextrose 50% Injectable 25 milliLiter(s) IV Push once  dextrose 50% Injectable 25 Gram(s) IV Push once  dextrose 50% Injectable 12.5 Gram(s) IV Push once  dextrose 50% Injectable 25 Gram(s) IV Push once  DULoxetine 30 milliGRAM(s) Oral <User Schedule>  fentaNYL   Infusion. 0.5 MICROgram(s)/kG/Hr (3.25 mL/Hr) IV Continuous <Continuous>  folic acid 1 milliGRAM(s) Oral <User Schedule>  glucagon  Injectable 1 milliGRAM(s) IntraMuscular once  heparin   Injectable 5000 Unit(s) SubCutaneous every 12 hours  hydrocortisone 2.5% Cream 1 Application(s) Topical two times a day  insulin lispro (ADMELOG) corrective regimen sliding scale   SubCutaneous three times a day before meals  lactulose Syrup 10 Gram(s) Oral at bedtime  levothyroxine 25 MICROGram(s) Oral <User Schedule>  levothyroxine 50 MICROGram(s) Oral <User Schedule>  magnesium sulfate  IVPB 2 Gram(s) IV Intermittent once  magnesium sulfate  IVPB 2 Gram(s) IV Intermittent once  methotrexate (Non - oncologic) 10 milliGRAM(s) Oral every week  norepinephrine Infusion 0.05 MICROgram(s)/kG/Min (6.09 mL/Hr) IV Continuous <Continuous>  pantoprazole    Tablet 40 milliGRAM(s) Oral before breakfast  polyethylene glycol 3350 17 Gram(s) Oral two times a day  prasugrel 10 milliGRAM(s) Oral daily  senna 2 Tablet(s) Oral at bedtime        VITALS:  T(F): 96.9 (23 @ 08:00), Max: 99.3 (23 @ 13:25)  HR: 116 (23 @ 11:00)  BP: 94/42 (23 @ 08:00)  RR: 20 (23 @ 11:00)  SpO2: 100% (23 @ 11:00)  Wt(kg): --     @ 07:01  -   @ 07:00  --------------------------------------------------------  IN: 705.5 mL / OUT: 1800 mL / NET: -1094.5 mL     @ 07:01  -   @ 12:57  --------------------------------------------------------  IN: 310.3 mL / OUT: 62 mL / NET: 248.3 mL          PHYSICAL EXAM:  Gen: intubated/ventilated  Resp: b/l breath sounds  Card: S1/S2  Abd: soft  Extremities: no edema  Loja draining purulent urine    LABS:      147<H>  |  101  |  61<HH>  ----------------------------<  204<H>  4.1   |  26  |  3.5<H>    Ca    9.3      2023 04:43  Phos  5.8       Mg     2.3         TPro  6.2  /  Alb  3.3<L>  /  TBili  2.6<H>  /  DBili      /  AST  93<H>  /  ALT  87<H>  /  AlkPhos  124<H>                            10.9   12.21 )-----------( 183      ( 2023 04:43 )             38.0       Urine Studies:  Urinalysis Basic - ( 2023 12:15 )    Color: Yellow / Appearance: Turbid / S.020 / pH:   Gluc:  / Ketone: Negative  / Bili: Negative / Urobili: <2 mg/dL   Blood:  / Protein: 300 mg/dL / Nitrite: Positive   Leuk Esterase: Large / RBC: 20 /HPF / WBC >720 /HPF   Sq Epi:  / Non Sq Epi:  / Bacteria: Moderate        RADIOLOGY & ADDITIONAL STUDIES:

## 2023-06-06 NOTE — PROGRESS NOTE ADULT - ASSESSMENT
Assessment: 67-year-old male w/ HFrEF 15-20% s/p ICD, DM type I, DL, HTN, CAD, hx MI, s/p CABG, s/p stent (2018), hx in-stent thrombosis while on Plavix, PAD s/p 3 LLE stents, TIAGO non-compliant w/ CPAP, R-AKA presenting to ED s/p fall (mechanical). Patient found to be fluid overloaded with MOISES, heart failure consulted for further management of ADHF. Hospital course c/b AMS, elevated lactate with worsening renal/liver function. Patient was intubated 6/05 for airway protection, started empirically on inotropic support with good urine response + improving lactate. Patient was transferred to CCU for SWAN catheter placement for closer hemodynamic monitoring. Of note, patient also found to have +UA w/puss noted in fields bag.       Plan:  Patient intubated, off sedation- possible extubation later today  Milrinone gtt stopped this AM- repeat labs + lactate later today  Get BMP twice daily with magnesium   Maintain potassium >4.0, Mg >2.2  Strict intake and output  Daily weight   s/p IV iron sucrose 5 day course   Plan discussed with CCU team  Will continue to monitor

## 2023-06-06 NOTE — PROGRESS NOTE ADULT - ASSESSMENT
MOISES / ATN iso shock / decompensated heart failure / septic  Acute respiratory failure requiring mechanical ventilation    - fields, strict i/o  - on milrinone, levophed  - on iv bumex / appreciate heart failure f/u   - non oliguric, creat stable  - dose all meds for eGFR < 15 ,  -  Dose vanco by level  - no acute indication for RRT  - will follow up

## 2023-06-06 NOTE — PROGRESS NOTE ADULT - SUBJECTIVE AND OBJECTIVE BOX
Date of Admission: 23    Interval History: Patient seen and examined in bed, in NAD. +SWAN catheter. Patient remains intubated, sedation recently stopped- patient currently still lethargic at time of assessment. Inotropic support discontinued this AM as well. Lactate 1.6 on AM labs- repeat later today. 1.8L 24hr UOP noted, net negative 1L. Urine now without pus.       CHIEF COMPLAINT: Patient is a 67y old  Male who presents with a chief complaint of Mechanical fall (22 May 2023 12:56)    HISTORY OF PRESENT ILLNESS: 67-year-old male w/ HFrEF 15-20% s/p ICD, decreased RV function, mild MR/TR, DM type I w/ neuropathy and hx hypoglycemia , DL, HTN, CAD, hx MI, s/p CABG, s/p stent (2018), hx in-stent thrombosis while on Plavix, PAD s/p 3 LLE stents, TIAGO (needs CPAP auto-regulating pressure 10-16, patient doesn't use it due to claustrophobia), Psoriasis, R-AKA presenting to ED s/p fall. He states he was lying in bed at home when he fell asleep and fell off the bed, and complains of generalized headache neck pain, non-radiating, no alleviating or aggravating factors, associated with right anterior chest wall pain. Denies LOC,  fevers, chills, nausea, vomiting, dizziness, abdominal pain, shortness of breath. States he had TDAP recently. Pt was not down for long as wife was upstairs when he fell off the bed and called EMS right away.   Labs significant for cr 1.8 (baseline ~ 1.2)  trop 0.10 (18 May 2023 14:16)      PAST MEDICAL & SURGICAL HISTORY:  Status post percutaneous transluminal coronary angioplasty  2 stents  Atherosclerosis of coronary artery  CAD (coronary artery disease)  Osteoarthritis  HLD (hyperlipidemia)  Diabetes  on insulin pump  CHF (congestive heart failure)  EF ~ 25%  History of celiac disease  Diverticulitis  STEMI (ST elevation myocardial infarction)  Diabetic neuropathy  Hands and Feet  Anxiety and depression  Other postprocedural status  Fixation hardware in foot LEFT  Stented coronary artery  10/18 heart attack  INFERIOR WALL MI  S/P CABG x 1  2018  S/P TKR (total knee replacement), right  with infection Mrsa  per pt he was cleared from MRSA infection  Surgery, elective  right knee wound debridement  History of open reduction and internal fixation (ORIF) procedure  right hip  H/O shoulder surgery  right  AICD (automatic cardioverter/defibrillator) present  [facility]  Cholecystostomy care  drain inserted  & removed 4 months ago  History of tonsillectomy  History of hip replacement, total, right  Elective surgery  plastic surgery Left shin      FAMILY HISTORY: Patient was adopted, family history unknown to him  SOCIAL HISTORY:  Denies smoking, alcohol, or drug use      Allergies  ACE inhibitors (Rash)  Entresto (Swelling)  Atorvastatin Calcium (Unknown)  Bactrim (Unknown)  Plavix (Unknown)    Intolerances      PHYSICAL EXAM:  ICU Vital Signs Last 24 Hrs  T(C): 36 (2023 12:00), Max: 36.3 (2023 20:00)  T(F): 96.8 (2023 12:00), Max: 97.4 (2023 20:00)  HR: 114 (2023 14:00) (86 - 117)  BP: 94/42 (2023 08:00) (90/53 - 94/55)  BP(mean): 61 (2023 08:00) (61 - 70)  ABP: 116/52 (2023 14:00) (104/45 - 143/52)  ABP(mean): 71 (2023 14:00) (61 - 76)  RR: 21 (2023 14:00) (9 - 24)  SpO2: 100% (2023 14:00) (100% - 100%)    O2 Parameters below as of 2023 14:00  Patient On (Oxygen Delivery Method): ventilator    O2 Concentration (%): 40      General Appearance: intubated, lethargic (sedation recently d/c'd)  Neck: +SWAN catheter  Cardiovascular: regular rate and rhythm S1 S2, No edema  Respiratory: b/l breath sounds, MV  Psychiatry: lethargic, sedation recently d/c'd  Gastrointestinal:  Soft, +BS  Skin/Integumentary: No rashes, No ecchymoses, No cyanosis	  Musculoskeletal/ extremities: R AKA, No clubbing, cyanosis or edema  Vascular: Peripheral pulses palpable 2+ B/L UE         LABS:	 	    CBC Full  -  ( 2023 04:43 )  WBC Count : 12.21 K/uL  RBC Count : 5.03 M/uL  Hemoglobin : 10.9 g/dL  Hematocrit : 38.0 %  Platelet Count - Automated : 183 K/uL  Mean Cell Volume : 75.5 fL  Mean Cell Hemoglobin : 21.7 pg  Mean Cell Hemoglobin Concentration : 28.7 g/dL  Auto Neutrophil # : 10.45 K/uL  Auto Lymphocyte # : 0.55 K/uL  Auto Monocyte # : 1.15 K/uL  Auto Eosinophil # : 0.00 K/uL  Auto Basophil # : 0.01 K/uL  Auto Neutrophil % : 85.6 %  Auto Lymphocyte % : 4.5 %  Auto Monocyte % : 9.4 %  Auto Eosinophil % : 0.0 %  Auto Basophil % : 0.1 %      Comprehensive Metabolic Panel (23 @ 04:43)    Sodium, Serum: 147 mmol/L   Potassium, Serum: 4.1 mmol/L   Chloride, Serum: 101 mmol/L   Carbon Dioxide, Serum: 26 mmol/L   Anion Gap, Serum: 20 mmol/L   Blood Urea Nitrogen, Serum: 61: Critical value: mg/dL   Creatinine, Serum: 3.5 mg/dL   Glucose, Serum: 204 mg/dL   Calcium, Total Serum: 9.3 mg/dL   Protein Total, Serum: 6.2 g/dL   Albumin, Serum: 3.3 g/dL   Bilirubin Total, Serum: 2.6 mg/dL   Alkaline Phosphatase, Serum: 124 U/L   Aspartate Aminotransferase (AST/SGOT): 93 U/L   Alanine Aminotransferase (ALT/SGPT): 87 U/L   eGFR: 19: The estimated glomerular filtration rate (eGFR) is calculated using the   CKD-EPI creatinine equation, which does not have a coefficient for  race and is validated in individuals 18 years of age and older (N Engl J  Med ; [id number]). Creatinine-based eGFR may be inaccurate in  various situations including but not limited to extremes of muscle mass,  altered dietary protein intake, or medications that affect renal tubular  creatinine secretion. mL/min/1.73m2      CARDIAC MARKERS:  Pro-Brain Natriuretic Peptide: 9132 pg/mL (23 @ 07:01)        TELEMETRY EVENTS: 	    EC23    Ventricular Rate 79 BPM  Atrial Rate 79 BPM  P-R Interval 170 ms  QRS Duration 158 ms  Q-T Interval 480 ms  QTC Calculation(Bazett) 550 ms  P Axis 51 degrees  R Axis -35 degrees  T Axis 65 degrees    Diagnosis Line Atrial-sensed ventricular-paced rhythm  Abnormal ECG    Confirmed by London Santos ([id number]) on 2023 4:12:01 PM      PREVIOUS DIAGNOSTIC TESTING:    TTE 23    Summary:   1. Left ventricular ejection fraction, by visual estimation, is <20%.   2. Severely decreased global left ventricular systolic function.   3. Mildly increased LV wall thickness.   4. Severe concentric left ventricular hypertrophy.   5. Spectral Doppler shows restrictive pattern of left ventricular   myocardial filling (Grade III diastolic dysfunction).   6. Moderately enlarged right ventricle.   7. Moderately reduced RV systolic function.   8. Elevated mean left atrial pressure.   9. Moderately enlarged left atrium.  10.Moderate mitral valve regurgitation.  11. The mitral valve leaflets are tethered which is due to reduced   systolic function and elevated LVDP.  12. Moderate-severe tricuspid regurgitation.  13. Estimated pulmonary artery systolic pressure is 48.4 mmHg assuming a   right atrial pressure of 8 mmHg, which is consistent with mild pulmonary   hypertension.  14. Patient is in normal sinus rhythm.  15. Compared to the prior TTE dated 21, the patient's cardiomyopathy   has worsened.      Left Heart Catheterization: 18    IMPRESSIONS: There is significant single vessel native coronary artery  disease. Patent LIMA to LAD. No significant restenosis in RCA/OM1.    	    Home Medications:  aspirin 81 mg oral delayed release tablet: 1 tab(s) orally once a day (2023 02:35)  diazePAM 2 mg oral tablet: 0.5 tab(s) orally once a day (at bedtime) (2023 02:35)  DULoxetine 30 mg oral delayed release capsule: 1 cap(s) orally 3 times a day (2023 02:35)  insulin glargine 100 units/mL subcutaneous solution: 25 unit(s) subcutaneous once a day (at bedtime) (2023 02:35)  insulin glargine 100 units/mL subcutaneous solution: 40 microcurie subcutaneous once a day (2023 02:35)  insulin lispro 100 units/mL injectable solution: if your finger stick is 150-199 please inject 2 units  if your finger stick is 200-250 please inject 4 units  if your finger stick is 251-300 please inject 6 units  if your finger stick is 301-350 please inject 8 units  if your finger stick is more than 350 please call your physician    (2023 02:35)  levothyroxine 25 mcg (0.025 mg) oral tablet: 1 tab(s) orally 6 times a week (:35)  levothyroxine 50 mcg (0.05 mg) oral tablet: 1 tab(s) orally once a week (:35)  metoprolol succinate 25 mg oral tablet, extended release: 1 tab(s) orally once a day (2023 02:35)  Multiple Vitamins oral tablet: 1 tab(s) orally once a day (:35)  pantoprazole 40 mg oral delayed release tablet: 1 tab(s) orally once a day (before a meal) (:35)  prasugrel 10 mg oral tablet: 1 tab(s) orally once a day (:35)  rosuvastatin 40 mg oral tablet: 1 tab(s) orally once a day (:35)  spironolactone 25 mg oral tablet: 1 tab(s) orally once a day (2023 02:35)        MEDICATIONS  (STANDING):  aspirin enteric coated 81 milliGRAM(s) Oral daily  dextrose 5% + sodium chloride 0.9%. 1000 milliLiter(s) (50 mL/Hr) IV Continuous <Continuous>  dextrose 5%. 1000 milliLiter(s) (100 mL/Hr) IV Continuous <Continuous>  dextrose 5%. 1000 milliLiter(s) (50 mL/Hr) IV Continuous <Continuous>  dextrose 50% Injectable 25 Gram(s) IV Push once  dextrose 50% Injectable 12.5 Gram(s) IV Push once  dextrose 50% Injectable 25 Gram(s) IV Push once  diazepam    Tablet 1 milliGRAM(s) Oral at bedtime  DULoxetine 30 milliGRAM(s) Oral <User Schedule>  furosemide   Injectable 20 milliGRAM(s) IV Push two times a day  glucagon  Injectable 1 milliGRAM(s) IntraMuscular once  heparin   Injectable 5000 Unit(s) SubCutaneous every 12 hours  hydrocortisone 2.5% Cream 1 Application(s) Topical two times a day  insulin glargine Injectable (LANTUS) 12 Unit(s) SubCutaneous at bedtime  insulin lispro (ADMELOG) corrective regimen sliding scale   SubCutaneous three times a day before meals  lactulose Syrup 10 Gram(s) Oral at bedtime  levothyroxine 25 MICROGram(s) Oral <User Schedule>  levothyroxine 50 MICROGram(s) Oral <User Schedule>  metoprolol succinate ER 25 milliGRAM(s) Oral daily  midodrine. 10 milliGRAM(s) Oral three times a day  pantoprazole    Tablet 40 milliGRAM(s) Oral before breakfast  polyethylene glycol 3350 17 Gram(s) Oral two times a day  prasugrel 10 milliGRAM(s) Oral daily  senna 2 Tablet(s) Oral at bedtime    MEDICATIONS  (PRN):  dextrose Oral Gel 15 Gram(s) Oral once PRN Blood Glucose LESS THAN 70 milliGRAM(s)/deciliter

## 2023-06-07 NOTE — PROGRESS NOTE ADULT - ASSESSMENT
ASSESSMENT  67-year-old male w/ HFrEF 15-20% s/p ICD, decreased RV function, mild MR/TR, DM type I w/ neuropathy and hx hypoglycemia , DL, HTN, CAD, hx MI, s/p CABG, s/p stent (2018), hx in-stent thrombosis while on Plavix, PAD s/p 3 LLE stents, TIAGO (needs CPAP auto-regulating pressure 10-16, patient doesn't use it due to claustrophobia), Psoriasis, R-AKA presenting to ED s/p fall    IMPRESSION  #s/p Fall  #Shock - septic (UTI) vs cardiogenic  - Blood Cx 6/4 negative  - urine ccx 6/5 with GNR -- purulent appearing when fields placed in ED  - MRSA nares positive     #HFrEF s/p ICD   #CAD s/p CABG   #PAD s/p stent   #hx of Right AKA     #Abx allergy: Entresto (Swelling)  Atorvastatin Calcium (Unknown)  Bactrim (Unknown)  Plavix (Unknown)      RECOMMENDATIONS  - continue cefepime 2g q 24 hours -- follow-up GNR in urine Cx   - continue to hold vancomycin - Blood Cx negative for MRSA and low suspicion for MRSA pneumonia   - trend WBC     Please call or message on Microsoft Teams if with any questions.  Spectra [phone]

## 2023-06-07 NOTE — PROGRESS NOTE ADULT - ASSESSMENT
MOISES / ATN iso shock / decompensated heart failure / septic/ hypernatremia   Acute respiratory failure requiring mechanical ventilation    - patricia fields i/o  - on milrinone, levophed  - on iv bumex / off now  - hypernatremia increase free water to 400 cc q4h / follow BMP  - had low C4 and weak IgG Kappa on previous blood work/ check C3 C4 SPEP UPEP SFLC and IF   - dose all meds for eGFR <30  - no acute indication for RRT  - will follow up/ discussed with family at bedside

## 2023-06-07 NOTE — PROGRESS NOTE ADULT - SUBJECTIVE AND OBJECTIVE BOX
Nephrology progress note    THIS IS AN INCOMPLETE NOTE . FULL NOTE TO FOLLOW SHORTLY    Patient is seen and examined, events over the last 24 h noted .    Allergies:  ACE inhibitors (Rash)  Entresto (Swelling)  Atorvastatin Calcium (Unknown)  Bactrim (Unknown)  Plavix (Unknown)    Hospital Medications:   MEDICATIONS  (STANDING):  aspirin enteric coated 81 milliGRAM(s) Oral daily  cefepime   IVPB 2000 milliGRAM(s) IV Intermittent every 24 hours  chlorhexidine 0.12% Liquid 15 milliLiter(s) Oral Mucosa every 12 hours  chlorhexidine 2% Cloths 1 Application(s) Topical daily  dexMEDEtomidine Infusion 0.2 MICROgram(s)/kG/Hr (3.25 mL/Hr) IV Continuous <Continuous>  dextrose 5%. 1000 milliLiter(s) (75 mL/Hr) IV Continuous <Continuous>  dextrose 5%. 1000 milliLiter(s) (50 mL/Hr) IV Continuous <Continuous>  dextrose 5%. 1000 milliLiter(s) (100 mL/Hr) IV Continuous <Continuous>  dextrose 50% Injectable 25 Gram(s) IV Push once  dextrose 50% Injectable 12.5 Gram(s) IV Push once  dextrose 50% Injectable 25 milliLiter(s) IV Push once  dextrose 50% Injectable 25 Gram(s) IV Push once  DULoxetine 30 milliGRAM(s) Oral <User Schedule>  folic acid 1 milliGRAM(s) Oral <User Schedule>  glucagon  Injectable 1 milliGRAM(s) IntraMuscular once  heparin   Injectable 5000 Unit(s) SubCutaneous every 12 hours  hydrocortisone 2.5% Cream 1 Application(s) Topical two times a day  insulin lispro (ADMELOG) corrective regimen sliding scale   SubCutaneous three times a day before meals  lactulose Syrup 10 Gram(s) Oral at bedtime  levothyroxine 25 MICROGram(s) Oral <User Schedule>  levothyroxine 50 MICROGram(s) Oral <User Schedule>  magnesium sulfate  IVPB 2 Gram(s) IV Intermittent once  magnesium sulfate  IVPB 2 Gram(s) IV Intermittent once  methotrexate (Non - oncologic) 10 milliGRAM(s) Oral every week  norepinephrine Infusion 0.05 MICROgram(s)/kG/Min (6.09 mL/Hr) IV Continuous <Continuous>  pantoprazole    Tablet 40 milliGRAM(s) Oral before breakfast  polyethylene glycol 3350 17 Gram(s) Oral two times a day  prasugrel 10 milliGRAM(s) Oral daily  senna 2 Tablet(s) Oral at bedtime        VITALS:  T(F): 99.2 (23 @ 09:00), Max: 99.7 (23 @ 08:00)  HR: 105 (23 @ 09:00)  BP: --  RR: 20 (23 @ 09:00)  SpO2: 100% (23 @ 09:00)  Wt(kg): --     07:  -   07:00  --------------------------------------------------------  IN: 705.5 mL / OUT: 1800 mL / NET: -1094.5 mL    :  -   07:00  --------------------------------------------------------  IN: 1075.5 mL / OUT: 1267 mL / NET: -191.5 mL    :  -   @ 09:23  --------------------------------------------------------  IN: 346 mL / OUT: 335 mL / NET: 11 mL          PHYSICAL EXAM:  Constitutional: NAD  HEENT: anicteric sclera, oropharynx clear, MMM  Neck: No JVD  Respiratory: CTAB, no wheezes, rales or rhonchi  Cardiovascular: S1, S2, RRR  Gastrointestinal: BS+, soft, NT/ND  Extremities: No cyanosis or clubbing. No peripheral edema  :  No fields.   Skin: No rashes    LABS:      150<H>  |  104  |  55<H>  ----------------------------<  212<H>  3.3<L>   |  29  |  2.5<H>    Ca    8.6      2023 04:55  Phos  5.8     -  Mg     2.5         TPro  6.1  /  Alb  3.2<L>  /  TBili  2.1<H>  /  DBili      /  AST  47<H>  /  ALT  69<H>  /  AlkPhos  126<H>                            10.7   11.14 )-----------( 120      ( 2023 04:55 )             36.6       Urine Studies:  Urinalysis Basic - ( 2023 12:15 )    Color: Yellow / Appearance: Turbid / S.020 / pH:   Gluc:  / Ketone: Negative  / Bili: Negative / Urobili: <2 mg/dL   Blood:  / Protein: 300 mg/dL / Nitrite: Positive   Leuk Esterase: Large / RBC: 20 /HPF / WBC >720 /HPF   Sq Epi:  / Non Sq Epi:  / Bacteria: Moderate          Iron 13, TIBC 396, %sat 3      [23 @ 04:30]  Ferritin 56      [23 @ 04:30]  HbA1c 7.7      [20 @ 05:49]  TSH 5.61      [23 @ 05:10]  Lipid: chol 85, TG 38, HDL 37, LDL --      [23 @ 17:04]    HCV 0.15, Nonreact      [10-22-19 @ 07:00]  HIV Nonreact      [23 @ 11:27]    OBED: titer 1:80, pattern Speckled      [23 @ 06:08]  C3 Complement 90      [23 @ 10:40]  C4 Complement 11      [23 @ 10:40]  Syphilis Screen (Treponema Pallidum Ab) Negative      [09-10-20 @ 10:53]  Free Light Chains: kappa 11.08, lambda 4.40, ratio = 2.52      [ @ 17:00]  Immunofixation Serum:    Weak IgG Kappa Band Identified    Reference Range: None Detected      [21 @ 17:00]  Immunofixation Urine: Reference Range: None Detected      [21 @ 20:33]  UPEP Interpretation: Normal Electrophoresis Pattern      [21 @ 20:33]      RADIOLOGY & ADDITIONAL STUDIES:   Nephrology progress note    Patient is seen and examined, events over the last 24 h noted .  Lying in bed intubated on MV     Allergies:  ACE inhibitors (Rash)  Entresto (Swelling)  Atorvastatin Calcium (Unknown)  Bactrim (Unknown)  Plavix (Unknown)    Hospital Medications:   MEDICATIONS  (STANDING):  aspirin enteric coated 81 milliGRAM(s) Oral daily  cefepime   IVPB 2000 milliGRAM(s) IV Intermittent every 24 hours  dexMEDEtomidine Infusion 0.2 MICROgram(s)/kG/Hr (3.25 mL/Hr) IV Continuous <Continuous>  DULoxetine 30 milliGRAM(s) Oral <User Schedule>  folic acid 1 milliGRAM(s) Oral <User Schedule>  glucagon  Injectable 1 milliGRAM(s) IntraMuscular once  heparin   Injectable 5000 Unit(s) SubCutaneous every 12 hours  hydrocortisone 2.5% Cream 1 Application(s) Topical two times a day  insulin lispro (ADMELOG) corrective regimen sliding scale   SubCutaneous three times a day before meals  lactulose Syrup 10 Gram(s) Oral at bedtime  levothyroxine 25 MICROGram(s) Oral <User Schedule>  levothyroxine 50 MICROGram(s) Oral <User Schedule>  magnesium sulfate  IVPB 2 Gram(s) IV Intermittent once  magnesium sulfate  IVPB 2 Gram(s) IV Intermittent once  methotrexate (Non - oncologic) 10 milliGRAM(s) Oral every week  norepinephrine Infusion 0.05 MICROgram(s)/kG/Min (6.09 mL/Hr) IV Continuous <Continuous>  pantoprazole    Tablet 40 milliGRAM(s) Oral before breakfast  polyethylene glycol 3350 17 Gram(s) Oral two times a day  prasugrel 10 milliGRAM(s) Oral daily  senna 2 Tablet(s) Oral at bedtime        VITALS:  T(F): 99.2 (23 @ 09:00), Max: 99.7 (23 @ 08:00)  HR: 105 (23 @ 09:00)  RR: 20 (23 @ 09:00)  SpO2: 100% (23 @ 09:00)  Wt(kg): --     @ 07:01  -   @ 07:00  --------------------------------------------------------  IN: 705.5 mL / OUT: 1800 mL / NET: -1094.5 mL     @ 07: @ 07:00  --------------------------------------------------------  IN: 1075.5 mL / OUT: 1267 mL / NET: -191.5 mL     @ 07: @ 09:23  --------------------------------------------------------  IN: 346 mL / OUT: 335 mL / NET: 11 mL          PHYSICAL EXAM:  Constitutional: intubated on MV   Respiratory: CTAB,  Cardiovascular: S1, S2, RRR  Gastrointestinal: BS+, soft, NT/ND  Extremities: No cyanosis or clubbing. No peripheral edema  :  No fields.   Skin: No rashes    LABS:      150<H>  |  104  |  55<H>  ----------------------------<  212<H>  3.3<L>   |  29  |  2.5<H>      Creatinine Trend: 2.5<--, 3.5<--, 3.5<--, 3.6<--, 3.4<--, 3.2<--    Ca    8.6      2023 04:55  Phos  5.8     06  Mg     2.5     06    TPro  6.1  /  Alb  3.2<L>  /  TBili  2.1<H>  /  DBili      /  AST  47<H>  /  ALT  69<H>  /  AlkPhos  126<H>                            10.7   11.14 )-----------( 120      ( 2023 04:55 )             36.6       Urine Studies:  Urinalysis Basic - ( 2023 12:15 )    Color: Yellow / Appearance: Turbid / S.020 / pH:   Gluc:  / Ketone: Negative  / Bili: Negative / Urobili: <2 mg/dL   Blood:  / Protein: 300 mg/dL / Nitrite: Positive   Leuk Esterase: Large / RBC: 20 /HPF / WBC >720 /HPF   Sq Epi:  / Non Sq Epi:  / Bacteria: Moderate          Iron 13, TIBC 396, %sat 3      [23 @ 04:30]  Ferritin 56      [23 @ 04:30]  HbA1c 7.7      [20 @ 05:49]  TSH 5.61      [23 @ 05:10]  Lipid: chol 85, TG 38, HDL 37, LDL --      [23 @ 17:04]    HCV 0.15, Nonreact      [10-22-19 @ 07:00]  HIV Nonreact      [23 @ 11:27]    OBED: titer 1:80, pattern Speckled      [23 @ 06:08]  C3 Complement 90      [23 @ 10:40]  C4 Complement 11      [23 @ 10:40]  Syphilis Screen (Treponema Pallidum Ab) Negative      [09-10-20 @ 10:53]  Free Light Chains: kappa 11.08, lambda 4.40, ratio = 2.52      [ @ 17:00]  Immunofixation Serum:    Weak IgG Kappa Band Identified    Reference Range: None Detected      [21 @ 17:00]  Immunofixation Urine: Reference Range: None Detected      [21 @ 20:33]  UPEP Interpretation: Normal Electrophoresis Pattern      [21 @ 20:33]      RADIOLOGY & ADDITIONAL STUDIES:

## 2023-06-07 NOTE — PROGRESS NOTE ADULT - ASSESSMENT
67y M pmh HFrEF 15-20% s/p ICD, decreased RV function, mild MR/TR, DM type I w/ neuropathy, hx hypoglycemia , DLD, HTN, CAD, hx MI, s/p CABG, s/p stent (2018), hx in-stent thrombosis while on Plavix, PAD s/p 3 LLE stents, TIAGO (needs CPAP auto-regulating pressure 10-16, patient doesn't use it due to claustrophobia), Psoriasis, R-AKA presenting to ED s/p fall admitted for trauma w/u c/b acute hypoxic respiratory failure 2/2 septic vs cardiogenic shock.     Acute hypoxemic/hypercapnia respiratory failure   Septic/Cardiogenic Shock   UTI  HFrEF s/p ICD   CAD s/p PCI  Lactic acidosis  MOISES on CKD  Transaminitis  Iron deficiency anemia   DMI w/ neuropathy  Psoriasis  PVD s/p R AKA, LLE stenting x3  s/p mechanical fall  Hx LLE in-stent thrombosis while on Plavix  TIAGO      PLAN:    CNS:   - off sedation  - CTH, EEG -ve  - SAT     HEENT:  Oral care  - feeding via OG tube w/ free water flushes    PULMONARY  - HOB @ 45 degrees, keep Sao2 92 to 96%  - CXR OG/ET tube in place   - once SAT successful will start SBT     CARDIOVASCULAR:   - TTE 5/24: EF 20%, global cardiomyopathy, mod MR, mod-sev TR  - trop 0.23>0.16  - off milrinone  - off midodrine  - wean levo as tolerated  - hemodynamics unchanged   - strict I/Os     GI  - start OG tube feeds  - LFTs unchanged, , AST/ALT 93/87  - trend LFTS  - c/w lactulose 10mg qhs, senna 2 tab qhs, miralax bid  - GI PPX 40mg qD    RENAL  - Na 150, c/w D5W @75cc/hr + free water flushes   - Cr 1.1>3.5>2.5 stable today, reported bsl CKD III  - UA +ve protein, nitrite, LE, blood, wbc, bacteria  - KBUS -ve hydronephrosis   - UOP inmproved w/ hydration   - trend BMP qD, correct electrolytes as needed  - strict I/Os c/w fields     INFECTIOUS DISEASE  - off vancomymin  - MRSA nares +ve - mupirocin BID x5d  - UA positive, Bcx -ve 6/4, UCx +ve [place]-rods  - c/w cefepime 2g q24hr pending sensitivities     HEMATOLOGICAL  - c/w heparin 5k BID  - PAD LLE - arterial duplex adequate flow,-ve DVT  - c/w prasugrel 10mg po qD, ASA 81mg qD  - hgb stable ~11, mcv 75, serum iron low, %sat low, ferritin wnl s/p venofer x5d  - c/w folate 1mg S/M/T/Th/F/Sa    ENDOCRINE:   - FS goal 140-180, c/w ISS, restart insulin once on po feeding   - c/w levothyroxine 25mcg M/T/W/Th/F/Sa, 50mcg Due[place]as  - c/w MTX 10mg qW    # To follow up  - f/u urine sensitivities   - wean levo as tolerated, repeat hemodynamics   - trend LFTs, Cr, monitor UOP   - SAT/SBT  - correct Na w/ free water     # Misc  - DVT Prophylaxis: heparin   Injectable  - GI Prophylaxis: pantoprazole    Tablet 40 milliGRAM(s) Oral before breakfast  - Diet: Diet, NPO  - Activity: Activity - IAT  - Code Status: Full

## 2023-06-07 NOTE — PROGRESS NOTE ADULT - SUBJECTIVE AND OBJECTIVE BOX
Date of Admission: 23    Interval History: Patient seen and examined in bed. +SWAN catheter. Patient remains intubated, off sedation- failed SAT yesterday and earlier this AM. At time of assessment- patient more awake . L 24hr UOP noted, net negative L.     CHIEF COMPLAINT: Patient is a 67y old  Male who presents with a chief complaint of Mechanical fall (22 May 2023 12:56)    HISTORY OF PRESENT ILLNESS: 67-year-old male w/ HFrEF 15-20% s/p ICD, decreased RV function, mild MR/TR, DM type I w/ neuropathy and hx hypoglycemia , DL, HTN, CAD, hx MI, s/p CABG, s/p stent (2018), hx in-stent thrombosis while on Plavix, PAD s/p 3 LLE stents, TIAGO (needs CPAP auto-regulating pressure 10-, patient doesn't use it due to claustrophobia), Psoriasis, R-AKA presenting to ED s/p fall. He states he was lying in bed at home when he fell asleep and fell off the bed, and complains of generalized headache neck pain, non-radiating, no alleviating or aggravating factors, associated with right anterior chest wall pain. Denies LOC,  fevers, chills, nausea, vomiting, dizziness, abdominal pain, shortness of breath. States he had TDAP recently. Pt was not down for long as wife was upstairs when he fell off the bed and called EMS right away.   Labs significant for cr 1.8 (baseline ~ 1.2)  trop 0.10 (18 May 2023 14:16)      PAST MEDICAL & SURGICAL HISTORY:  Status post percutaneous transluminal coronary angioplasty  2 stents  Atherosclerosis of coronary artery  CAD (coronary artery disease)  Osteoarthritis  HLD (hyperlipidemia)  Diabetes  on insulin pump  CHF (congestive heart failure)  EF ~ 25%  History of celiac disease  Diverticulitis  STEMI (ST elevation myocardial infarction)  Diabetic neuropathy  Hands and Feet  Anxiety and depression  Other postprocedural status  Fixation hardware in foot LEFT  Stented coronary artery  10/18 heart attack  INFERIOR WALL MI  S/P CABG x 1  2018  S/P TKR (total knee replacement), right  with infection Mrsa  per pt he was cleared from MRSA infection  Surgery, elective  right knee wound debridement  History of open reduction and internal fixation (ORIF) procedure  right hip  H/O shoulder surgery  right  AICD (automatic cardioverter/defibrillator) present  St Kali  Cholecystostomy care  drain inserted  & removed 4 months ago  History of tonsillectomy  History of hip replacement, total, right  Elective surgery  plastic surgery Left shin      FAMILY HISTORY: Patient was adopted, family history unknown to him  SOCIAL HISTORY:  Denies smoking, alcohol, or drug use      Allergies  ACE inhibitors (Rash)  Entresto (Swelling)  Atorvastatin Calcium (Unknown)  Bactrim (Unknown)  Plavix (Unknown)    Intolerances      PHYSICAL EXAM:  ICU Vital Signs Last 24 Hrs  T(C): 37.1 (2023 16:00), Max: 37.6 (2023 08:00)  T(F): 98.8 (2023 16:00), Max: 99.7 (2023 08:00)  HR: 102 (2023 16:50) (93 - 118)  BP: --  BP(mean): --  ABP: 113/52 (2023 16:00) (28/3 - 137/59)  ABP(mean): 68 (2023 16:00) (22 - 94)  RR: 20 (2023 16:00) (20 - 27)  SpO2: 100% (2023 16:50) (100% - 100%)    O2 Parameters below as of 2023 16:00  Patient On (Oxygen Delivery Method): ventilator    O2 Concentration (%): 40        General Appearance: intubated, drowsy, biting ET tube  Neck: +SWAN catheter  Cardiovascular: regular rate and rhythm S1 S2, No edema  Respiratory: b/l breath sounds, MV  Psychiatry: drowsy, appears mildly agitated  Gastrointestinal:  Soft, +BS  Skin/Integumentary: No rashes, No ecchymoses, No cyanosis	  Musculoskeletal/ extremities: R AKA, No clubbing, cyanosis or edema  Vascular: Peripheral pulses palpable 2+ B/L UE         LABS:	 	    CBC Full  -  ( 2023 04:55 )  WBC Count : 11.14 K/uL  RBC Count : 4.92 M/uL  Hemoglobin : 10.7 g/dL  Hematocrit : 36.6 %  Platelet Count - Automated : 120 K/uL  Mean Cell Volume : 74.4 fL  Mean Cell Hemoglobin : 21.7 pg  Mean Cell Hemoglobin Concentration : 29.2 g/dL  Auto Neutrophil # : 9.51 K/uL  Auto Lymphocyte # : 0.39 K/uL  Auto Monocyte # : 1.08 K/uL  Auto Eosinophil # : 0.07 K/uL  Auto Basophil # : 0.01 K/uL  Auto Neutrophil % : 85.4 %  Auto Lymphocyte % : 3.5 %  Auto Monocyte % : 9.7 %  Auto Eosinophil % : 0.6 %  Auto Basophil % : 0.1 %    Basic Metabolic Panel (23 @ 16:20)    Sodium, Serum: 145 mmol/L   Potassium, Serum: 3.5 mmol/L   Chloride, Serum: 104 mmol/L   Carbon Dioxide, Serum: 30 mmol/L   Anion Gap, Serum: 11 mmol/L   Blood Urea Nitrogen, Serum: 43 mg/dL   Creatinine, Serum: 2.0 mg/dL   Glucose, Serum: 249 mg/dL   Calcium, Total Serum: 8.4 mg/dL   eGFR: 36: The estimated glomerular filtration rate (eGFR) is calculated using the   CKD-EPI creatinine equation, which does not have a coefficient for  race and is validated in individuals 18 years of age and older (N Engl J  Med ; 385:2130-2440). Creatinine-based eGFR may be inaccurate in  various situations including but not limited to extremes of muscle mass,  altered dietary protein intake, or medications that affect renal tubular  creatinine secretion. mL/min/1.73m2      CARDIAC MARKERS:  Pro-Brain Natriuretic Peptide: 9132 pg/mL (23 @ 07:01)        TELEMETRY EVENTS: 	    EC23    Ventricular Rate 79 BPM  Atrial Rate 79 BPM  P-R Interval 170 ms  QRS Duration 158 ms  Q-T Interval 480 ms  QTC Calculation(Bazett) 550 ms  P Axis 51 degrees  R Axis -35 degrees  T Axis 65 degrees    Diagnosis Line Atrial-sensed ventricular-paced rhythm  Abnormal ECG    Confirmed by London Santos ([id number]) on 2023 4:12:01 PM      PREVIOUS DIAGNOSTIC TESTING:    TTE 23    Summary:   1. Left ventricular ejection fraction, by visual estimation, is <20%.   2. Severely decreased global left ventricular systolic function.   3. Mildly increased LV wall thickness.   4. Severe concentric left ventricular hypertrophy.   5. Spectral Doppler shows restrictive pattern of left ventricular   myocardial filling (Grade III diastolic dysfunction).   6. Moderately enlarged right ventricle.   7. Moderately reduced RV systolic function.   8. Elevated mean left atrial pressure.   9. Moderately enlarged left atrium.  10.Moderate mitral valve regurgitation.  11. The mitral valve leaflets are tethered which is due to reduced   systolic function and elevated LVDP.  12. Moderate-severe tricuspid regurgitation.  13. Estimated pulmonary artery systolic pressure is 48.4 mmHg assuming a   right atrial pressure of 8 mmHg, which is consistent with mild pulmonary   hypertension.  14. Patient is in normal sinus rhythm.  15. Compared to the prior TTE dated 21, the patient's cardiomyopathy   has worsened.      Left Heart Catheterization: 18    IMPRESSIONS: There is significant single vessel native coronary artery  disease. Patent LIMA to LAD. No significant restenosis in RCA/OM1.    	    Home Medications:  aspirin 81 mg oral delayed release tablet: 1 tab(s) orally once a day (2023 02:35)  diazePAM 2 mg oral tablet: 0.5 tab(s) orally once a day (at bedtime) (:35)  DULoxetine 30 mg oral delayed release capsule: 1 cap(s) orally 3 times a day (2023 02:35)  insulin glargine 100 units/mL subcutaneous solution: 25 unit(s) subcutaneous once a day (at bedtime) (2023 02:35)  insulin glargine 100 units/mL subcutaneous solution: 40 microcurie subcutaneous once a day (2023 02:35)  insulin lispro 100 units/mL injectable solution: if your finger stick is 150-199 please inject 2 units  if your finger stick is 200-250 please inject 4 units  if your finger stick is 251-300 please inject 6 units  if your finger stick is 301-350 please inject 8 units  if your finger stick is more than 350 please call your physician    (2023 02:35)  levothyroxine 25 mcg (0.025 mg) oral tablet: 1 tab(s) orally 6 times a week (2023 02:35)  levothyroxine 50 mcg (0.05 mg) oral tablet: 1 tab(s) orally once a week (2023 02:35)  metoprolol succinate 25 mg oral tablet, extended release: 1 tab(s) orally once a day (2023 02:35)  Multiple Vitamins oral tablet: 1 tab(s) orally once a day (2023 02:35)  pantoprazole 40 mg oral delayed release tablet: 1 tab(s) orally once a day (before a meal) (2023 02:35)  prasugrel 10 mg oral tablet: 1 tab(s) orally once a day (2023 02:35)  rosuvastatin 40 mg oral tablet: 1 tab(s) orally once a day (2023 02:35)  spironolactone 25 mg oral tablet: 1 tab(s) orally once a day (2023 02:35)        MEDICATIONS  (STANDING):  aspirin enteric coated 81 milliGRAM(s) Oral daily  dextrose 5% + sodium chloride 0.9%. 1000 milliLiter(s) (50 mL/Hr) IV Continuous <Continuous>  dextrose 5%. 1000 milliLiter(s) (100 mL/Hr) IV Continuous <Continuous>  dextrose 5%. 1000 milliLiter(s) (50 mL/Hr) IV Continuous <Continuous>  dextrose 50% Injectable 25 Gram(s) IV Push once  dextrose 50% Injectable 12.5 Gram(s) IV Push once  dextrose 50% Injectable 25 Gram(s) IV Push once  diazepam    Tablet 1 milliGRAM(s) Oral at bedtime  DULoxetine 30 milliGRAM(s) Oral <User Schedule>  furosemide   Injectable 20 milliGRAM(s) IV Push two times a day  glucagon  Injectable 1 milliGRAM(s) IntraMuscular once  heparin   Injectable 5000 Unit(s) SubCutaneous every 12 hours  hydrocortisone 2.5% Cream 1 Application(s) Topical two times a day  insulin glargine Injectable (LANTUS) 12 Unit(s) SubCutaneous at bedtime  insulin lispro (ADMELOG) corrective regimen sliding scale   SubCutaneous three times a day before meals  lactulose Syrup 10 Gram(s) Oral at bedtime  levothyroxine 25 MICROGram(s) Oral <User Schedule>  levothyroxine 50 MICROGram(s) Oral <User Schedule>  metoprolol succinate ER 25 milliGRAM(s) Oral daily  midodrine. 10 milliGRAM(s) Oral three times a day  pantoprazole    Tablet 40 milliGRAM(s) Oral before breakfast  polyethylene glycol 3350 17 Gram(s) Oral two times a day  prasugrel 10 milliGRAM(s) Oral daily  senna 2 Tablet(s) Oral at bedtime    MEDICATIONS  (PRN):  dextrose Oral Gel 15 Gram(s) Oral once PRN Blood Glucose LESS THAN 70 milliGRAM(s)/deciliter         Date of Admission: 23    Interval History: Patient seen and examined in bed. +SWAN catheter. Patient remains intubated, off sedation- failed SAT yesterday and earlier this AM. At time of heart failure assessment- patient drowsy but more responsive than previously noted. 1.1L 24hr UOP noted, net positive 200cc.     CHIEF COMPLAINT: Patient is a 67y old  Male who presents with a chief complaint of Mechanical fall (22 May 2023 12:56)    HISTORY OF PRESENT ILLNESS: 67-year-old male w/ HFrEF 15-20% s/p ICD, decreased RV function, mild MR/TR, DM type I w/ neuropathy and hx hypoglycemia , DL, HTN, CAD, hx MI, s/p CABG, s/p stent (2018), hx in-stent thrombosis while on Plavix, PAD s/p 3 LLE stents, TIAGO (needs CPAP auto-regulating pressure 10-, patient doesn't use it due to claustrophobia), Psoriasis, R-AKA presenting to ED s/p fall. He states he was lying in bed at home when he fell asleep and fell off the bed, and complains of generalized headache neck pain, non-radiating, no alleviating or aggravating factors, associated with right anterior chest wall pain. Denies LOC,  fevers, chills, nausea, vomiting, dizziness, abdominal pain, shortness of breath. States he had TDAP recently. Pt was not down for long as wife was upstairs when he fell off the bed and called EMS right away.   Labs significant for cr 1.8 (baseline ~ 1.2)  trop 0.10 (18 May 2023 14:16)      PAST MEDICAL & SURGICAL HISTORY:  Status post percutaneous transluminal coronary angioplasty  2 stents  Atherosclerosis of coronary artery  CAD (coronary artery disease)  Osteoarthritis  HLD (hyperlipidemia)  Diabetes  on insulin pump  CHF (congestive heart failure)  EF ~ 25%  History of celiac disease  Diverticulitis  STEMI (ST elevation myocardial infarction)  Diabetic neuropathy  Hands and Feet  Anxiety and depression  Other postprocedural status  Fixation hardware in foot LEFT  Stented coronary artery  10/18 heart attack  INFERIOR WALL MI  S/P CABG x 1  2018  S/P TKR (total knee replacement), right  with infection Mrsa  per pt he was cleared from MRSA infection  Surgery, elective  right knee wound debridement  History of open reduction and internal fixation (ORIF) procedure  right hip  H/O shoulder surgery  right  AICD (automatic cardioverter/defibrillator) present  St Kali  Cholecystostomy care  drain inserted  & removed 4 months ago  History of tonsillectomy  History of hip replacement, total, right  Elective surgery  plastic surgery Left shin      FAMILY HISTORY: Patient was adopted, family history unknown to him  SOCIAL HISTORY:  Denies smoking, alcohol, or drug use      Allergies  ACE inhibitors (Rash)  Entresto (Swelling)  Atorvastatin Calcium (Unknown)  Bactrim (Unknown)  Plavix (Unknown)    Intolerances      PHYSICAL EXAM:  ICU Vital Signs Last 24 Hrs  T(C): 37.1 (2023 16:00), Max: 37.6 (2023 08:00)  T(F): 98.8 (2023 16:00), Max: 99.7 (2023 08:00)  HR: 102 (2023 16:50) (93 - 118)  BP: --  BP(mean): --  ABP: 113/52 (2023 16:00) (28/3 - 137/59)  ABP(mean): 68 (2023 16:00) (22 - 94)  RR: 20 (2023 16:00) (20 - 27)  SpO2: 100% (2023 16:50) (100% - 100%)    O2 Parameters below as of 2023 16:00  Patient On (Oxygen Delivery Method): ventilator    O2 Concentration (%): 40        General Appearance: intubated, drowsy, biting ET tube  Neck: +SWAN catheter  Cardiovascular: regular rate and rhythm S1 S2, No edema  Respiratory: b/l breath sounds, MV  Psychiatry: drowsy, appears mildly agitated  Gastrointestinal:  Soft, +BS  Skin/Integumentary: No rashes, No ecchymoses, No cyanosis	  Musculoskeletal/ extremities: R AKA, No clubbing, cyanosis or edema  Vascular: Peripheral pulses palpable 2+ B/L UE         LABS:	 	    CBC Full  -  ( 2023 04:55 )  WBC Count : 11.14 K/uL  RBC Count : 4.92 M/uL  Hemoglobin : 10.7 g/dL  Hematocrit : 36.6 %  Platelet Count - Automated : 120 K/uL  Mean Cell Volume : 74.4 fL  Mean Cell Hemoglobin : 21.7 pg  Mean Cell Hemoglobin Concentration : 29.2 g/dL  Auto Neutrophil # : 9.51 K/uL  Auto Lymphocyte # : 0.39 K/uL  Auto Monocyte # : 1.08 K/uL  Auto Eosinophil # : 0.07 K/uL  Auto Basophil # : 0.01 K/uL  Auto Neutrophil % : 85.4 %  Auto Lymphocyte % : 3.5 %  Auto Monocyte % : 9.7 %  Auto Eosinophil % : 0.6 %  Auto Basophil % : 0.1 %    Basic Metabolic Panel (23 @ 16:20)    Sodium, Serum: 145 mmol/L   Potassium, Serum: 3.5 mmol/L   Chloride, Serum: 104 mmol/L   Carbon Dioxide, Serum: 30 mmol/L   Anion Gap, Serum: 11 mmol/L   Blood Urea Nitrogen, Serum: 43 mg/dL   Creatinine, Serum: 2.0 mg/dL   Glucose, Serum: 249 mg/dL   Calcium, Total Serum: 8.4 mg/dL   eGFR: 36: The estimated glomerular filtration rate (eGFR) is calculated using the   CKD-EPI creatinine equation, which does not have a coefficient for  race and is validated in individuals 18 years of age and older (N Engl J  Med ; 385:7235-1738). Creatinine-based eGFR may be inaccurate in  various situations including but not limited to extremes of muscle mass,  altered dietary protein intake, or medications that affect renal tubular  creatinine secretion. mL/min/1.73m2      CARDIAC MARKERS:  Pro-Brain Natriuretic Peptide: 9132 pg/mL (23 @ 07:01)        TELEMETRY EVENTS: 	    EC23    Ventricular Rate 79 BPM  Atrial Rate 79 BPM  P-R Interval 170 ms  QRS Duration 158 ms  Q-T Interval 480 ms  QTC Calculation(Bazett) 550 ms  P Axis 51 degrees  R Axis -35 degrees  T Axis 65 degrees    Diagnosis Line Atrial-sensed ventricular-paced rhythm  Abnormal ECG    Confirmed by London Santos ([id number]) on 2023 4:12:01 PM      PREVIOUS DIAGNOSTIC TESTING:    TTE 23    Summary:   1. Left ventricular ejection fraction, by visual estimation, is <20%.   2. Severely decreased global left ventricular systolic function.   3. Mildly increased LV wall thickness.   4. Severe concentric left ventricular hypertrophy.   5. Spectral Doppler shows restrictive pattern of left ventricular   myocardial filling (Grade III diastolic dysfunction).   6. Moderately enlarged right ventricle.   7. Moderately reduced RV systolic function.   8. Elevated mean left atrial pressure.   9. Moderately enlarged left atrium.  10.Moderate mitral valve regurgitation.  11. The mitral valve leaflets are tethered which is due to reduced   systolic function and elevated LVDP.  12. Moderate-severe tricuspid regurgitation.  13. Estimated pulmonary artery systolic pressure is 48.4 mmHg assuming a   right atrial pressure of 8 mmHg, which is consistent with mild pulmonary   hypertension.  14. Patient is in normal sinus rhythm.  15. Compared to the prior TTE dated 21, the patient's cardiomyopathy   has worsened.      Left Heart Catheterization: 18    IMPRESSIONS: There is significant single vessel native coronary artery  disease. Patent LIMA to LAD. No significant restenosis in RCA/OM1.    	    Home Medications:  aspirin 81 mg oral delayed release tablet: 1 tab(s) orally once a day (2023 02:35)  diazePAM 2 mg oral tablet: 0.5 tab(s) orally once a day (at bedtime) (2023 02:35)  DULoxetine 30 mg oral delayed release capsule: 1 cap(s) orally 3 times a day (2023 02:35)  insulin glargine 100 units/mL subcutaneous solution: 25 unit(s) subcutaneous once a day (at bedtime) (2023 02:35)  insulin glargine 100 units/mL subcutaneous solution: 40 microcurie subcutaneous once a day (2023 02:35)  insulin lispro 100 units/mL injectable solution: if your finger stick is 150-199 please inject 2 units  if your finger stick is 200-250 please inject 4 units  if your finger stick is 251-300 please inject 6 units  if your finger stick is 301-350 please inject 8 units  if your finger stick is more than 350 please call your physician    (2023 02:35)  levothyroxine 25 mcg (0.025 mg) oral tablet: 1 tab(s) orally 6 times a week (2023 02:35)  levothyroxine 50 mcg (0.05 mg) oral tablet: 1 tab(s) orally once a week (2023 02:35)  metoprolol succinate 25 mg oral tablet, extended release: 1 tab(s) orally once a day (2023 02:35)  Multiple Vitamins oral tablet: 1 tab(s) orally once a day (2023 02:35)  pantoprazole 40 mg oral delayed release tablet: 1 tab(s) orally once a day (before a meal) (2023 02:35)  prasugrel 10 mg oral tablet: 1 tab(s) orally once a day (2023 02:35)  rosuvastatin 40 mg oral tablet: 1 tab(s) orally once a day (2023 02:35)  spironolactone 25 mg oral tablet: 1 tab(s) orally once a day (2023 02:35)        MEDICATIONS  (STANDING):  aspirin enteric coated 81 milliGRAM(s) Oral daily  dextrose 5% + sodium chloride 0.9%. 1000 milliLiter(s) (50 mL/Hr) IV Continuous <Continuous>  dextrose 5%. 1000 milliLiter(s) (100 mL/Hr) IV Continuous <Continuous>  dextrose 5%. 1000 milliLiter(s) (50 mL/Hr) IV Continuous <Continuous>  dextrose 50% Injectable 25 Gram(s) IV Push once  dextrose 50% Injectable 12.5 Gram(s) IV Push once  dextrose 50% Injectable 25 Gram(s) IV Push once  diazepam    Tablet 1 milliGRAM(s) Oral at bedtime  DULoxetine 30 milliGRAM(s) Oral <User Schedule>  furosemide   Injectable 20 milliGRAM(s) IV Push two times a day  glucagon  Injectable 1 milliGRAM(s) IntraMuscular once  heparin   Injectable 5000 Unit(s) SubCutaneous every 12 hours  hydrocortisone 2.5% Cream 1 Application(s) Topical two times a day  insulin glargine Injectable (LANTUS) 12 Unit(s) SubCutaneous at bedtime  insulin lispro (ADMELOG) corrective regimen sliding scale   SubCutaneous three times a day before meals  lactulose Syrup 10 Gram(s) Oral at bedtime  levothyroxine 25 MICROGram(s) Oral <User Schedule>  levothyroxine 50 MICROGram(s) Oral <User Schedule>  metoprolol succinate ER 25 milliGRAM(s) Oral daily  midodrine. 10 milliGRAM(s) Oral three times a day  pantoprazole    Tablet 40 milliGRAM(s) Oral before breakfast  polyethylene glycol 3350 17 Gram(s) Oral two times a day  prasugrel 10 milliGRAM(s) Oral daily  senna 2 Tablet(s) Oral at bedtime    MEDICATIONS  (PRN):  dextrose Oral Gel 15 Gram(s) Oral once PRN Blood Glucose LESS THAN 70 milliGRAM(s)/deciliter

## 2023-06-07 NOTE — PROGRESS NOTE ADULT - SUBJECTIVE AND OBJECTIVE BOX
HPI:  67-year-old male w/ HFrEF 15-20% s/p ICD, decreased RV function, mild MR/TR, DM type I w/ neuropathy and hx hypoglycemia , DL, HTN, CAD, hx MI, s/p CABG, s/p stent (2018), hx in-stent thrombosis while on Plavix, PAD s/p 3 LLE stents, TIAGO (needs CPAP auto-regulating pressure 10-16, patient doesn't use it due to claustrophobia), Psoriasis, R-AKA presenting to ED s/p fall. He states he was lying in bed at home when he fell asleep and fell off the bed, and complains of generalized headache neck pain, non-radiating, no alleviating or aggravating factors, associated with right anterior chest wall pain. Denies LOC,  fevers, chills, nausea, vomiting, dizziness, abdominal pain, shortness of breath. States he had TDAP recently. Pt was not down for long as wife was upstairs when he fell off the bed and called EMS right away.     ED  Vital Signs  T(F):Max: 97.9   HR: 76 - 110  BP: 100/61 - 102/62  RR: 18   SpO2: 94% - 96% on room air     Labs significant for cr 1.8 (baseline ~ 1.2)  trop 0.10             (18 May 2023 14:16)    Currently admitted to medicine with the primary diagnosis of Acute HF (heart failure)       Today is hospital day 20d.     INTERVAL HPI / OVERNIGHT EVENTS:  Patient was examined and seen at bedside. Overnight patient agitated, restarted on sedation, this morning now off sedation for SAT/SBT. MAP adequate on low dose levophed, NPO bending SBT, making appropriate urine, having BMs.     ROS: Otherwise unremarkable     PAST MEDICAL & SURGICAL HISTORY  Status post percutaneous transluminal coronary angioplasty  2 stents    Atherosclerosis of coronary artery  CAD (coronary artery disease)    Osteoarthritis    HLD (hyperlipidemia)    Diabetes  on insulin pump    CHF (congestive heart failure)  EF ~ 25%    History of celiac disease    Diverticulitis    STEMI (ST elevation myocardial infarction)    Diabetic neuropathy  Hands and Feet    Anxiety and depression    Other postprocedural status  Fixation hardware in foot LEFT    Stented coronary artery  10/18 heart attack  INFERIOR WALL MI    S/P CABG x 1  2018    S/P TKR (total knee replacement), right  with infection Mrsa   per pt he was cleared from MRSA infection    Surgery, elective  right knee wound debridement    History of open reduction and internal fixation (ORIF) procedure  right hip    H/O shoulder surgery  right    AICD (automatic cardioverter/defibrillator) present  [facility]    Cholecystostomy care  drain inserted  & removed 4 months ago    History of tonsillectomy    History of hip replacement, total, right    Elective surgery  plastic surgery Left shin      ALLERGIES  ACE inhibitors (Rash)  Entresto (Swelling)  Atorvastatin Calcium (Unknown)  Bactrim (Unknown)  Plavix (Unknown)    MEDICATIONS  STANDING MEDICATIONS  aspirin enteric coated 81 milliGRAM(s) Oral daily  cefepime   IVPB 2000 milliGRAM(s) IV Intermittent every 24 hours  chlorhexidine 0.12% Liquid 15 milliLiter(s) Oral Mucosa every 12 hours  chlorhexidine 2% Cloths 1 Application(s) Topical daily  dexMEDEtomidine Infusion 0.2 MICROgram(s)/kG/Hr IV Continuous <Continuous>  dextrose 5%. 1000 milliLiter(s) IV Continuous <Continuous>  dextrose 5%. 1000 milliLiter(s) IV Continuous <Continuous>  dextrose 5%. 1000 milliLiter(s) IV Continuous <Continuous>  dextrose 50% Injectable 25 Gram(s) IV Push once  dextrose 50% Injectable 12.5 Gram(s) IV Push once  dextrose 50% Injectable 25 Gram(s) IV Push once  dextrose 50% Injectable 25 milliLiter(s) IV Push once  DULoxetine 30 milliGRAM(s) Oral <User Schedule>  folic acid 1 milliGRAM(s) Oral <User Schedule>  glucagon  Injectable 1 milliGRAM(s) IntraMuscular once  heparin   Injectable 5000 Unit(s) SubCutaneous every 12 hours  hydrocortisone 2.5% Cream 1 Application(s) Topical two times a day  insulin lispro (ADMELOG) corrective regimen sliding scale   SubCutaneous three times a day before meals  lactulose Syrup 10 Gram(s) Oral at bedtime  levothyroxine 25 MICROGram(s) Oral <User Schedule>  levothyroxine 50 MICROGram(s) Oral <User Schedule>  magnesium sulfate  IVPB 2 Gram(s) IV Intermittent once  magnesium sulfate  IVPB 2 Gram(s) IV Intermittent once  methotrexate (Non - oncologic) 10 milliGRAM(s) Oral every week  norepinephrine Infusion 0.05 MICROgram(s)/kG/Min IV Continuous <Continuous>  pantoprazole    Tablet 40 milliGRAM(s) Oral before breakfast  polyethylene glycol 3350 17 Gram(s) Oral two times a day  prasugrel 10 milliGRAM(s) Oral daily  senna 2 Tablet(s) Oral at bedtime    PRN MEDICATIONS  acetaminophen     Tablet .. 325 milliGRAM(s) Oral every 4 hours PRN  dextrose Oral Gel 15 Gram(s) Oral once PRN  oxyCODONE    IR 5 milliGRAM(s) Oral every 6 hours PRN    VITALS:  T(F): 99.2  HR: 105  BP: --  RR: 20  SpO2: 100%    PHYSICAL EXAM  GEN: NAD, sedated, responds to painful stimuli  PULM: Clear breath sounds b/l, No wheezing, rales, rhonchi, intubated on MV 20/400/8/40  CVS: Regular rate and rhythm, S1-S2, no murmurs, heaves, thrills, R IJ swan catheter C/D/I  ABD: Soft, non-tender, non-distended, no guarding, BS +, fields w/ clear yellow urine   EXT: R AKA, LLE erythematous up to mid calf, distal pulse not palpable, no swelling/edema, multiple ischemic/necrotic ulcerations, warm/dry, L femoral udal catheter c/d/i  NEURO: Sedated CONSUELO -2, does not follow commands, responds to painful stimuli     LABS                        10.7   11.14 )-----------( 120      ( 2023 04:55 )             36.6     06-07    150<H>  |  104  |  55<H>  ----------------------------<  212<H>  3.3<L>   |  29  |  2.5<H>    Ca    8.6      2023 04:55  Phos  5.8     06-06  Mg     2.5     06-07    TPro  6.1  /  Alb  3.2<L>  /  TBili  2.1<H>  /  DBili  x   /  AST  47<H>  /  ALT  69<H>  /  AlkPhos  126<H>  06-07    PT/INR - ( 2023 09:40 )   PT: 24.90 sec;   INR: 2.13 ratio         PTT - ( 2023 09:40 )  PTT:28.1 sec  Urinalysis Basic - ( 2023 12:15 )    Color: Yellow / Appearance: Turbid / S.020 / pH: x  Gluc: x / Ketone: Negative  / Bili: Negative / Urobili: <2 mg/dL   Blood: x / Protein: 300 mg/dL / Nitrite: Positive   Leuk Esterase: Large / RBC: 20 /HPF / WBC >720 /HPF   Sq Epi: x / Non Sq Epi: x / Bacteria: Moderate      ABG - ( 2023 04:49 )  pH, Arterial: 7.40  pH, Blood: x     /  pCO2: 51    /  pO2: 158   / HCO3: 32    / Base Excess: 5.7   /  SaO2: 98.2              Lactate, Blood: 1.2 mmol/L (23 @ 04:55)      Culture - Blood (collected 2023 11:50)  Source: .Blood Blood  Preliminary Report (2023 16:02):    No growth to date.      CARDIAC MARKERS ( 2023 17:41 )  x     / 0.16 ng/mL / x     / x     / x      CARDIAC MARKERS ( 2023 09:40 )  x     / 0.23 ng/mL / x     / x     / x            RADIOLOGY  CTH   1.  No CT evidence for acute intracranial pathology.  2.  Mild to moderate chronic microvascular ischemic changes.    KBUS   No hydronephrosis.    LLE arterial and venous duplex  - prelim read   Normal arterial flow in the left lower extremity.  No evidence of deep venous thrombosis or superficial thrombophlebitis in   the left lower extremity.      CT C/A/P   No CT evidence for acute traumatic injury within the chest, abdomen or   pelvis. Moderate to large bilateral pleural effusions, right greater than left.   Small to moderate volume abdominopelvic ascites. Cardiomegaly with focal discontinuity anterior left atrial wall, unchanged. Unchanged left renal pelvic dilatation with appearance of left UPJ obstruction.    TTE :   1. Severely decreased global left ventricular systolic function.   2. Multiple left ventricular regional wall motion abnormalities exist.   See wall motion findings.   3. LV Ejection Fraction by Wilkerson's Method with a biplane EF of 20 %.   4. Global cardiomyopathy.   5. Normal left ventricular internal cavity size.   6. Mildly enlarged right ventricle.   7. Moderately reduced RV systolic function.   8. Mildly enlarged right atrium.   9. Moderately enlarged left atrium.  10. There is no evidence of pericardial effusion.  11. Mild thickening and calcification of the anterior and posterior   mitral valve leaflets.  12. Moderate mitral annular calcification.  13. Moderate mitral valve regurgitation.  14. Moderate-severe tricuspid regurgitation.

## 2023-06-07 NOTE — PROGRESS NOTE ADULT - SUBJECTIVE AND OBJECTIVE BOX
Patient is a 67y old  Male who presents with a chief complaint of Mechanical fall (2023 12:57)      Over Night Events:  Patient seen and examined.   still vented   on levo off Milrinone   urine output impoved      ROS:  See HPI    PHYSICAL EXAM    ICU Vital Signs Last 24 Hrs  T(C): 37.6 (2023 08:00), Max: 37.6 (2023 08:00)  T(F): 99.7 (2023 08:00), Max: 99.7 (2023 08:00)  HR: 105 (2023 08:08) (93 - 118)  BP: --  BP(mean): --  ABP: 115/49 (2023 08:00) (28/3 - 137/59)  ABP(mean): 68 (2023 08:00) (22 - 94)  RR: 21 (2023 08:00) (20 - 24)  SpO2: 100% (2023 08:08) (100% - 100%)    O2 Parameters below as of 2023 08:00  Patient On (Oxygen Delivery Method): ventilator    O2 Concentration (%): 40        General:awake   HEENT:  et          Lymph Nodes: NO cervical LN   Lungs: Bilateral BS  Cardiovascular: Regular   Abdomen: Soft, Positive BS  Extremities: No clubbing   Skin: warm   Neurological: no focal   Musculoskeletal: move all ext     I&O's Detail    2023 07:01  -  2023 07:00  --------------------------------------------------------  IN:    Dexmedetomidine: 99 mL    Dexmedetomidine: 58 mL    FentaNYL: 13.2 mL    IV PiggyBack: 450 mL    Norepinephrine: 205.3 mL    Sodium Chloride 0.9% Bolus: 250 mL  Total IN: 1075.5 mL    OUT:    Indwelling Catheter - Urethral (mL): 1267 mL  Total OUT: 1267 mL    Total NET: -191.5 mL      2023 07:01  -  2023 08:17  --------------------------------------------------------  IN:    dextrose 5%: 150 mL    Norepinephrine: 14 mL  Total IN: 164 mL    OUT:    Dexmedetomidine: 0 mL    Indwelling Catheter - Urethral (mL): 175 mL  Total OUT: 175 mL    Total NET: -11 mL          LABS:                          10.7   11.14 )-----------( 120      ( 2023 04:55 )             36.6         2023 04:55    150    |  104    |  55     ----------------------------<  212    3.3     |  29     |  2.5      Ca    8.6        2023 04:55  Phos  5.8       2023 04:43  Mg     2.5       2023 04:55    TPro  6.1    /  Alb  3.2    /  TBili  2.1    /  DBili  x      /  AST  47     /  ALT  69     /  AlkPhos  126    2023 04:55  Amylase x     lipase x                                                 PT/INR - ( 2023 09:40 )   PT: 24.90 sec;   INR: 2.13 ratio         PTT - ( 2023 09:40 )  PTT:28.1 sec                                       Urinalysis Basic - ( 2023 12:15 )    Color: Yellow / Appearance: Turbid / S.020 / pH: x  Gluc: x / Ketone: Negative  / Bili: Negative / Urobili: <2 mg/dL   Blood: x / Protein: 300 mg/dL / Nitrite: Positive   Leuk Esterase: Large / RBC: 20 /HPF / WBC >720 /HPF   Sq Epi: x / Non Sq Epi: x / Bacteria: Moderate        Lactate, Blood: 1.2 mmol/L (23 @ 04:55)  Lactate, Blood: 1.6 mmol/L (23 @ 04:43)  Lactate, Blood: 2.0 mmol/L (23 @ 17:41)  Lactate, Blood: 4.6 mmol/L (23 @ 09:40)  Lactate, Blood: 2.9 mmol/L (23 @ 18:31)    CARDIAC MARKERS ( 2023 17:41 )  x     / 0.16 ng/mL / x     / x     / x      CARDIAC MARKERS ( 2023 09:40 )  x     / 0.23 ng/mL / x     / x     / x                                                            Culture - Blood (collected 2023 11:50)  Source: .Blood Blood  Preliminary Report (2023 16:02):    No growth to date.                                                   Mode: AC/ CMV (Assist Control/ Continuous Mandatory Ventilation)  RR (machine): 20  TV (machine): 400  FiO2: 40  PEEP: 8  ITime: 1  MAP: 11  PIP: 21                                      ABG - ( 2023 04:49 )  pH, Arterial: 7.40  pH, Blood: x     /  pCO2: 51    /  pO2: 158   / HCO3: 32    / Base Excess: 5.7   /  SaO2: 98.2                MEDICATIONS  (STANDING):  aspirin enteric coated 81 milliGRAM(s) Oral daily  cefepime   IVPB 2000 milliGRAM(s) IV Intermittent every 24 hours  chlorhexidine 0.12% Liquid 15 milliLiter(s) Oral Mucosa every 12 hours  chlorhexidine 2% Cloths 1 Application(s) Topical daily  dexMEDEtomidine Infusion 0.2 MICROgram(s)/kG/Hr (3.25 mL/Hr) IV Continuous <Continuous>  dextrose 5%. 1000 milliLiter(s) (100 mL/Hr) IV Continuous <Continuous>  dextrose 5%. 1000 milliLiter(s) (50 mL/Hr) IV Continuous <Continuous>  dextrose 5%. 1000 milliLiter(s) (75 mL/Hr) IV Continuous <Continuous>  dextrose 50% Injectable 25 Gram(s) IV Push once  dextrose 50% Injectable 25 Gram(s) IV Push once  dextrose 50% Injectable 12.5 Gram(s) IV Push once  dextrose 50% Injectable 25 milliLiter(s) IV Push once  DULoxetine 30 milliGRAM(s) Oral <User Schedule>  folic acid 1 milliGRAM(s) Oral <User Schedule>  glucagon  Injectable 1 milliGRAM(s) IntraMuscular once  heparin   Injectable 5000 Unit(s) SubCutaneous every 12 hours  hydrocortisone 2.5% Cream 1 Application(s) Topical two times a day  insulin lispro (ADMELOG) corrective regimen sliding scale   SubCutaneous three times a day before meals  lactulose Syrup 10 Gram(s) Oral at bedtime  levothyroxine 50 MICROGram(s) Oral <User Schedule>  levothyroxine 25 MICROGram(s) Oral <User Schedule>  magnesium sulfate  IVPB 2 Gram(s) IV Intermittent once  magnesium sulfate  IVPB 2 Gram(s) IV Intermittent once  methotrexate (Non - oncologic) 10 milliGRAM(s) Oral every week  norepinephrine Infusion 0.05 MICROgram(s)/kG/Min (6.09 mL/Hr) IV Continuous <Continuous>  pantoprazole    Tablet 40 milliGRAM(s) Oral before breakfast  polyethylene glycol 3350 17 Gram(s) Oral two times a day  potassium chloride  20 mEq/100 mL IVPB 20 milliEquivalent(s) IV Intermittent every 2 hours  prasugrel 10 milliGRAM(s) Oral daily  senna 2 Tablet(s) Oral at bedtime    MEDICATIONS  (PRN):  acetaminophen     Tablet .. 325 milliGRAM(s) Oral every 4 hours PRN Moderate Pain (4 - 6)  dextrose Oral Gel 15 Gram(s) Oral once PRN Blood Glucose LESS THAN 70 milliGRAM(s)/deciliter  oxyCODONE    IR 5 milliGRAM(s) Oral every 6 hours PRN Pain 4-10          Xrays:  TLC:  OG:  ET tube:                                                                                    pending    ECHO:  CAM ICU:

## 2023-06-07 NOTE — PROGRESS NOTE ADULT - ASSESSMENT
IMPRESSION:    Acute hypoxemic and hypercapnia resp failure on NRM  Pul edema improved   HFrEF  Lactic acidosis  septic/ cardiogenic shock   Acute renal failure  possible bacterial superinfection  DM  Psoriasis  PVD  TIAGO  UTI   sepsis       PLAN:    CNS: consider  head CT negative   do SAT   HEENT:  Oral care    PULMONARY:  HOB @ 45 degrees, do cxr if stable proceed with SBT if passed extubate to NIV    ABG will need intubation, keep Sao2 92 to 96%      CARDIOVASCULAR: cardio fu, pressors/ inotropes as per cardiology trend LA  keep is = os   off diuretics   GI: GI prophylaxis                          if failed SBT start feed   free water per NG     RENAL:  F/u  lytes.  Correct as needed. accurate I/O, fields, renal US  on D5w follow NA level   INFECTIOUS DISEASE: cefepime, procal, pancx, fungitell    HEMATOLOGICAL:  DVT prophylaxis. LE doppler    ENDOCRINE:  Follow up FS.  Insulin protocol if needed. cortisol level      very poor prognosis    MICU care as per cardiology

## 2023-06-07 NOTE — PROGRESS NOTE ADULT - SUBJECTIVE AND OBJECTIVE BOX
FLOWER MARISCAL  67y, Male  Allergy: ACE inhibitors (Rash)  Entresto (Swelling)  Atorvastatin Calcium (Unknown)  Bactrim (Unknown)  Plavix (Unknown)      LOS  20d    CHIEF COMPLAINT: Mechanical fall (2023 09:40)      INTERVAL EVENTS/HPI  - No acute events overnight  - T(F): , Max: 99.7 (23 @ 08:00)  - off milrinone -- on levophed -- planned for SAT  - WBC Count: 11.14 (23 @ 04:55)  WBC Count: 12.21 (23 @ 04:43)     - Creatinine, Serum: 2.5 (23 @ 04:55)  Creatinine, Serum: 3.5 (23 @ 04:43)       ROS  unable to obtain history secondary to patient's mental status and/or sedation      VITALS:  T(F): 99.2, Max: 99.7 (23 @ 08:00)  HR: 105  BP: --  RR: 22Vital Signs Last 24 Hrs  T(C): 37.3 (2023 09:00), Max: 37.6 (2023 08:00)  T(F): 99.2 (2023 09:00), Max: 99.7 (2023 08:00)  HR: 105 (2023 10:00) (93 - 118)  BP: --  BP(mean): --  RR: 22 (2023 10:00) (20 - 22)  SpO2: 100% (2023 10:00) (100% - 100%)    Parameters below as of 2023 10:00  Patient On (Oxygen Delivery Method): ventilator    O2 Concentration (%): 40    PHYSICAL EXAM:  Gen: NAD, resting in bed  HEENT: Normocephalic, atraumatic  Neck: supple, no lymphadenopathy  CV: Regular rate & regular rhythm  Lungs: decreased BS at bases, no fremitus  Abdomen: Soft, BS present  Ext: Warm, well perfused  Neuro: non focal, awake  Skin: no rash, no erythema  Lines: no phlebitis    FH: Non-contributory  Social Hx: Non-contributory    TESTS & MEASUREMENTS:                        10.7   11.14 )-----------( 120      ( 2023 04:55 )             36.6     06-07    150<H>  |  104  |  55<H>  ----------------------------<  212<H>  3.3<L>   |  29  |  2.5<H>    Ca    8.6      2023 04:55  Phos  5.8     -  Mg     2.5     -    TPro  6.1  /  Alb  3.2<L>  /  TBili  2.1<H>  /  DBili  x   /  AST  47<H>  /  ALT  69<H>  /  AlkPhos  126<H>        LIVER FUNCTIONS - ( 2023 04:55 )  Alb: 3.2 g/dL / Pro: 6.1 g/dL / ALK PHOS: 126 U/L / ALT: 69 U/L / AST: 47 U/L / GGT: x           Urinalysis Basic - ( 2023 12:15 )    Color: Yellow / Appearance: Turbid / S.020 / pH: x  Gluc: x / Ketone: Negative  / Bili: Negative / Urobili: <2 mg/dL   Blood: x / Protein: 300 mg/dL / Nitrite: Positive   Leuk Esterase: Large / RBC: 20 /HPF / WBC >720 /HPF   Sq Epi: x / Non Sq Epi: x / Bacteria: Moderate        Culture - Urine (collected 23 @ 12:15)  Source: Catheterized Catheterized  Preliminary Report (23 @ 09:54):    >100,000 CFU/ml Gram Negative Rods    Culture - Blood (collected 23 @ 11:50)  Source: .Blood Blood  Preliminary Report (23 @ 16:02):    No growth to date.        Lactate, Blood: 1.2 mmol/L (23 @ 04:55)  Lactate, Blood: 1.6 mmol/L (23 @ 04:43)  Lactate, Blood: 2.0 mmol/L (23 @ 17:41)  Lactate, Blood: 4.6 mmol/L (23 @ 09:40)  Blood Gas Venous - Lactate: 4.60 mmol/L (23 @ 09:40)  Lactate, Blood: 2.9 mmol/L (23 @ 18:31)      INFECTIOUS DISEASES TESTING  MRSA PCR Result.: Positive (23 @ 04:42)  Procalcitonin, Serum: 0.56 (23 @ 11:50)  Procalcitonin, Serum: 0.09 (23 @ 17:04)  HIV-1/2 Combo Result: Nonreact (23 @ 11:27)  COVID-19 PCR: NotDetec (01-10-23 @ 18:16)  COVID-19 PCR: NotDetec (22 @ 09:52)  COVID- PCR: Negative (06-15-22 @ 18:54)      INFLAMMATORY MARKERS  Sedimentation Rate, Erythrocyte: 15 mm/Hr (23 @ 17:04)  C-Reactive Protein, Serum: 32.1 mg/L (23 @ 17:04)  Sedimentation Rate, Erythrocyte: 3 mm/Hr (23 @ 16:22)  C-Reactive Protein, Serum: 55.5 mg/L (23 @ 16:22)      RADIOLOGY & ADDITIONAL TESTS:  I have personally reviewed the last available Chest xray  CXR  Xray Chest 1 View- PORTABLE-Urgent:   ACC: [id number] EXAM:  XR CHEST PORTABLE URGENT 1V   ORDERED BY: MICHAEL DON     PROCEDURE DATE:  2023          INTERPRETATION:  STUDY INDICATION: OGT placement    TECHNIQUE:  Portable frontal view of the chest obtained.    COMPARISON: 2023    FINDINGS/  IMPRESSION:    ET tube is 7 cm above the hansa. NG tube coursing below left   hemidiaphragm terminating in the left upper quadrant. Unchanged [place]-Ruchi   catheter.    No focal consolidation, pneumothorax or pleural effusion.    Stable cardiomediastinal silhouette.    Unchanged osseous structures.    --- End of Report ---            MARILIN CORTES MD; Attending Radiologist  This document has been electronically signed. 2023 10:59AM (23 @ 22:56)      CT      CARDIOLOGY TESTING  12 Lead ECG:   Ventricular Rate 105 BPM    Atrial Rate 105 BPM    P-R Interval 134 ms    QRS Duration 176 ms    Q-T Interval 416 ms    QTC Calculation(Bazett) 549 ms    P Axis 71 degrees    R Axis -65 degrees    T Axis 48 degrees    Diagnosis Line Atrial-sensed ventricular-paced rhythm  Abnormal ECG    Confirmed by Vivien Be MD ([id number]) on 2023 11:13:27 AM (23 @ 05:25)  12 Lead ECG:   Ventricular Rate 102 BPM    Atrial Rate 102 BPM    P-R Interval 166 ms    QRS Duration 92 ms    Q-T Interval 370 ms    QTC Calculation(Bazett) 482 ms    R Axis -81 degrees    T Axis 59 degrees    Diagnosis Line Atrial-sensed ventricular-paced rhythm  Abnormal ECG    Confirmed by London Santos ([id number]) on 2023 8:36:13 AM (23 @ 11:54)      MEDICATIONS  aspirin enteric coated 81 Oral daily  cefepime   IVPB 2000 IV Intermittent every 24 hours  chlorhexidine 0.12% Liquid 15 Oral Mucosa every 12 hours  chlorhexidine 2% Cloths 1 Topical daily  dexMEDEtomidine Infusion 0.2 IV Continuous <Continuous>  dextrose 5%. 1000 IV Continuous <Continuous>  dextrose 5%. 1000 IV Continuous <Continuous>  dextrose 5%. 1000 IV Continuous <Continuous>  dextrose 50% Injectable 25 IV Push once  dextrose 50% Injectable 12.5 IV Push once  dextrose 50% Injectable 25 IV Push once  dextrose 50% Injectable 25 IV Push once  DULoxetine 30 Oral <User Schedule>  folic acid 1 Oral <User Schedule>  glucagon  Injectable 1 IntraMuscular once  heparin   Injectable 5000 SubCutaneous every 12 hours  hydrocortisone 2.5% Cream 1 Topical two times a day  insulin lispro (ADMELOG) corrective regimen sliding scale  SubCutaneous three times a day before meals  lactulose Syrup 10 Oral at bedtime  levothyroxine 25 Oral <User Schedule>  levothyroxine 50 Oral <User Schedule>  magnesium sulfate  IVPB 2 IV Intermittent once  magnesium sulfate  IVPB 2 IV Intermittent once  methotrexate (Non - oncologic) 10 Oral every week  mupirocin 2% Ointment 1 Both Nostrils two times a day  norepinephrine Infusion 0.05 IV Continuous <Continuous>  pantoprazole    Tablet 40 Oral before breakfast  polyethylene glycol 3350 17 Oral two times a day  prasugrel 10 Oral daily  senna 2 Oral at bedtime      WEIGHT  Weight (kg): 65 (23 @ 13:26)  Creatinine, Serum: 2.5 mg/dL (23 @ 04:55)      ANTIBIOTICS:  cefepime   IVPB 2000 milliGRAM(s) IV Intermittent every 24 hours      All available historical records have been reviewed

## 2023-06-07 NOTE — PROGRESS NOTE ADULT - ASSESSMENT
Assessment: 67-year-old male w/ HFrEF 15-20% s/p ICD, DM type I, DL, HTN, CAD, hx MI, s/p CABG, s/p stent (2018), hx in-stent thrombosis while on Plavix, PAD s/p 3 LLE stents, TIAGO non-compliant w/ CPAP, R-AKA presenting to ED s/p fall (mechanical). Patient found to be fluid overloaded with MOISES, heart failure consulted for further management of ADHF. Hospital course c/b AMS, elevated lactate with worsening renal/liver function. Patient was intubated 6/05 for airway protection, started empirically on inotropic support with good urine response + improving lactate. Patient was transferred to CCU for SWAN catheter placement for closer hemodynamic monitoring. Of note, patient also found to have +UA w/puss noted in fields bag.       Plan:  Patient intubated, off sedation- possible extubation later today  Milrinone gtt stopped this AM- repeat labs + lactate later today  Get BMP twice daily with magnesium   Maintain potassium >4.0, Mg >2.2  Strict intake and output  Daily weight   s/p IV iron sucrose 5 day course   Plan discussed with CCU team  Will continue to monitor    Assessment: 67-year-old male w/ HFrEF 15-20% s/p ICD, DM type I, DL, HTN, CAD, hx MI, s/p CABG, s/p stent (2018), hx in-stent thrombosis while on Plavix, PAD s/p 3 LLE stents, TIAGO non-compliant w/ CPAP, R-AKA presenting to ED s/p fall (mechanical). Patient found to be fluid overloaded with MOISES, heart failure consulted for further management of ADHF. Hospital course c/b AMS, elevated lactate with worsening renal/liver function. Patient was intubated 6/05 for airway protection, started empirically on inotropic support with good urine response + improving lactate. Patient was transferred to CCU for SWAN catheter placement for closer hemodynamic monitoring. Of note, patient also found to have +UA w/puss noted in fields bag.       Plan:  Patient intubated, off sedation- failed SAT yesterday and earlier this AM  Off inotropic support  Remains on low dose levophed, wean as tolerated for a goal MAP > 65 mmHg  Get BMP twice daily with magnesium   Maintain potassium >4.0, Mg >2.2  Strict intake and output  Daily weight   s/p IV iron sucrose 5 day course   Plan discussed with CCU team  Will continue to monitor

## 2023-06-08 NOTE — PROGRESS NOTE ADULT - SUBJECTIVE AND OBJECTIVE BOX
Patient is a 67y old  Male who presents with a chief complaint of Mechanical fall (08 Jun 2023 08:36)      Over Night Events:  Patient seen and examined.   s/p extubation 30 min ago on NIV   comfortable     ROS:  See HPI    PHYSICAL EXAM    ICU Vital Signs Last 24 Hrs  T(C): 37.3 (08 Jun 2023 08:00), Max: 37.3 (08 Jun 2023 08:00)  T(F): 99.1 (08 Jun 2023 08:00), Max: 99.1 (08 Jun 2023 08:00)  HR: 103 (08 Jun 2023 09:00) (89 - 109)  BP: 90/61 (08 Jun 2023 09:00) (90/61 - 102/55)  BP(mean): 71 (08 Jun 2023 09:00) (71 - 73)  ABP: 95/55 (08 Jun 2023 08:00) (88/71 - 134/62)  ABP(mean): 73 (08 Jun 2023 08:00) (62 - 93)  RR: 22 (08 Jun 2023 09:00) (20 - 27)  SpO2: 100% (08 Jun 2023 09:00) (100% - 100%)    O2 Parameters below as of 08 Jun 2023 09:00  Patient On (Oxygen Delivery Method): BiPAP/CPAP            General:awake   HEENT:         steven       Lymph Nodes: NO cervical LN   Lungs: Bilateral BS  Cardiovascular: Regular   Abdomen: Soft, Positive BS  Extremities: No clubbing   Skin: warm   Neurological: no focal   Musculoskeletal: move all ext     I&O's Detail    07 Jun 2023 07:01  -  08 Jun 2023 07:00  --------------------------------------------------------  IN:    Dexmedetomidine: 31.2 mL    dextrose 5%: 1200 mL    Free Water: 250 mL    Glucerna: 385 mL    IV PiggyBack: 200 mL    Norepinephrine: 106.4 mL  Total IN: 2172.6 mL    OUT:    Indwelling Catheter - Urethral (mL): 2175 mL  Total OUT: 2175 mL    Total NET: -2.4 mL      08 Jun 2023 07:01  -  08 Jun 2023 09:15  --------------------------------------------------------  IN:  Total IN: 0 mL    OUT:    Dexmedetomidine: 0 mL    Indwelling Catheter - Urethral (mL): 100 mL    Norepinephrine: 0 mL  Total OUT: 100 mL    Total NET: -100 mL          LABS:                          10.4   9.23  )-----------( 105      ( 08 Jun 2023 04:07 )             34.7         08 Jun 2023 04:07    144    |  105    |  34     ----------------------------<  257    4.1     |  31     |  1.4      Ca    8.5        08 Jun 2023 04:07  Mg     1.9       08 Jun 2023 04:07    TPro  5.6    /  Alb  2.9    /  TBili  2.4    /  DBili  x      /  AST  28     /  ALT  45     /  AlkPhos  99     08 Jun 2023 04:07  Amylase x     lipase x                                                                                            Lactate, Blood: 1.3 mmol/L (06-08-23 @ 04:07)  Lactate, Blood: 1.2 mmol/L (06-07-23 @ 04:55)  Lactate, Blood: 1.6 mmol/L (06-06-23 @ 04:43)  Lactate, Blood: 2.0 mmol/L (06-05-23 @ 17:41)  Lactate, Blood: 4.6 mmol/L (06-05-23 @ 09:40)                                                          Culture - Blood (collected 06 Jun 2023 11:46)  Source: .Blood None  Preliminary Report (07 Jun 2023 22:01):    No growth to date.    Culture - Urine (collected 05 Jun 2023 12:15)  Source: Catheterized Catheterized  Preliminary Report (07 Jun 2023 14:49):    >100,000 CFU/ml Klebsiella pneumoniae                                                   Mode: NIV (Noninvasive Ventilation)                                      ABG - ( 08 Jun 2023 08:22 )  pH, Arterial: 7.45  pH, Blood: x     /  pCO2: 52    /  pO2: 137   / HCO3: 36    / Base Excess: 10.2  /  SaO2: 99.8                MEDICATIONS  (STANDING):  aspirin enteric coated 81 milliGRAM(s) Oral daily  cefepime   IVPB 2000 milliGRAM(s) IV Intermittent every 24 hours  chlorhexidine 0.12% Liquid 15 milliLiter(s) Oral Mucosa every 12 hours  chlorhexidine 2% Cloths 1 Application(s) Topical daily  dexMEDEtomidine Infusion 0.2 MICROgram(s)/kG/Hr (3.25 mL/Hr) IV Continuous <Continuous>  dextrose 5%. 1000 milliLiter(s) (50 mL/Hr) IV Continuous <Continuous>  dextrose 5%. 1000 milliLiter(s) (100 mL/Hr) IV Continuous <Continuous>  dextrose 50% Injectable 25 Gram(s) IV Push once  dextrose 50% Injectable 12.5 Gram(s) IV Push once  dextrose 50% Injectable 25 milliLiter(s) IV Push once  dextrose 50% Injectable 25 Gram(s) IV Push once  DULoxetine 30 milliGRAM(s) Oral <User Schedule>  folic acid 1 milliGRAM(s) Oral <User Schedule>  glucagon  Injectable 1 milliGRAM(s) IntraMuscular once  heparin   Injectable 5000 Unit(s) SubCutaneous every 12 hours  hydrocortisone 2.5% Cream 1 Application(s) Topical two times a day  insulin lispro (ADMELOG) corrective regimen sliding scale   SubCutaneous three times a day before meals  lactulose Syrup 10 Gram(s) Oral at bedtime  levothyroxine 25 MICROGram(s) Oral <User Schedule>  levothyroxine 50 MICROGram(s) Oral <User Schedule>  mupirocin 2% Ointment 1 Application(s) Both Nostrils two times a day  norepinephrine Infusion 0.05 MICROgram(s)/kG/Min (6.09 mL/Hr) IV Continuous <Continuous>  pantoprazole    Tablet 40 milliGRAM(s) Oral before breakfast  polyethylene glycol 3350 17 Gram(s) Oral two times a day  prasugrel 10 milliGRAM(s) Oral daily  senna 2 Tablet(s) Oral at bedtime    MEDICATIONS  (PRN):  acetaminophen     Tablet .. 325 milliGRAM(s) Oral every 4 hours PRN Moderate Pain (4 - 6)  dextrose Oral Gel 15 Gram(s) Oral once PRN Blood Glucose LESS THAN 70 milliGRAM(s)/deciliter          Xrays:  TLC:  OG:  ET tube:                                                                                    no opacity    ECHO:  [facility] ICU:

## 2023-06-08 NOTE — PROGRESS NOTE ADULT - ASSESSMENT
MOISES / ATN iso shock / decompensated heart failure / septic/ hypernatremia   Acute respiratory failure requiring mechanical ventilation    - fields, strict i/o  - on milrinone, levophed  - off diuretics   - hypernatremia resolved  keep  free water to 400 cc q4h  - had low C4 and weak IgG Kappa on previous blood work/ check C3 C4 SPEP UPEP SFLC and IF / call back if positive results   - no acute indication for RRT      will sign off recall PRN / call using TEAMS or on [phone]

## 2023-06-08 NOTE — PROGRESS NOTE ADULT - SUBJECTIVE AND OBJECTIVE BOX
Nephrology progress note    THIS IS AN INCOMPLETE NOTE . FULL NOTE TO FOLLOW SHORTLY    Patient is seen and examined, events over the last 24 h noted .    Allergies:  ACE inhibitors (Rash)  Entresto (Swelling)  Atorvastatin Calcium (Unknown)  Bactrim (Unknown)  Plavix (Unknown)    Hospital Medications:   MEDICATIONS  (STANDING):  aspirin enteric coated 81 milliGRAM(s) Oral daily  cefepime   IVPB 2000 milliGRAM(s) IV Intermittent every 24 hours  chlorhexidine 0.12% Liquid 15 milliLiter(s) Oral Mucosa every 12 hours  chlorhexidine 2% Cloths 1 Application(s) Topical daily  dexMEDEtomidine Infusion 0.2 MICROgram(s)/kG/Hr (3.25 mL/Hr) IV Continuous <Continuous>  dextrose 5%. 1000 milliLiter(s) (100 mL/Hr) IV Continuous <Continuous>  dextrose 5%. 1000 milliLiter(s) (50 mL/Hr) IV Continuous <Continuous>  dextrose 50% Injectable 25 milliLiter(s) IV Push once  dextrose 50% Injectable 25 Gram(s) IV Push once  dextrose 50% Injectable 25 Gram(s) IV Push once  dextrose 50% Injectable 12.5 Gram(s) IV Push once  DULoxetine 30 milliGRAM(s) Oral <User Schedule>  folic acid 1 milliGRAM(s) Oral <User Schedule>  glucagon  Injectable 1 milliGRAM(s) IntraMuscular once  heparin   Injectable 5000 Unit(s) SubCutaneous every 12 hours  hydrocortisone 2.5% Cream 1 Application(s) Topical two times a day  insulin lispro (ADMELOG) corrective regimen sliding scale   SubCutaneous three times a day before meals  lactulose Syrup 10 Gram(s) Oral at bedtime  levothyroxine 25 MICROGram(s) Oral <User Schedule>  levothyroxine 50 MICROGram(s) Oral <User Schedule>  mupirocin 2% Ointment 1 Application(s) Both Nostrils two times a day  norepinephrine Infusion 0.05 MICROgram(s)/kG/Min (6.09 mL/Hr) IV Continuous <Continuous>  pantoprazole    Tablet 40 milliGRAM(s) Oral before breakfast  polyethylene glycol 3350 17 Gram(s) Oral two times a day  prasugrel 10 milliGRAM(s) Oral daily  senna 2 Tablet(s) Oral at bedtime        VITALS:  T(F): 99.1 (23 @ 08:00), Max: 99.2 (23 @ 09:00)  HR: 109 (23 @ 08:00)  BP: 102/55 (23 @ 08:00)  RR: 20 (23 @ 08:00)  SpO2: 100% (23 @ 08:00)  Wt(kg): --    :  -   07:00  --------------------------------------------------------  IN: 1075.5 mL / OUT: 1297 mL / NET: -221.5 mL    :  -   07:00  --------------------------------------------------------  IN: 2172.6 mL / OUT: 2175 mL / NET: -2.4 mL     @ 07:01  -   @ 08:37  --------------------------------------------------------  IN: 0 mL / OUT: 100 mL / NET: -100 mL          PHYSICAL EXAM:  Constitutional: NAD  HEENT: anicteric sclera, oropharynx clear, MMM  Neck: No JVD  Respiratory: CTAB, no wheezes, rales or rhonchi  Cardiovascular: S1, S2, RRR  Gastrointestinal: BS+, soft, NT/ND  Extremities: No cyanosis or clubbing. No peripheral edema  :  No fields.   Skin: No rashes    LABS:      144  |  105  |  34<H>  ----------------------------<  257<H>  4.1   |  31  |  1.4    Ca    8.5      2023 04:07  Mg     1.9         TPro  5.6<L>  /  Alb  2.9<L>  /  TBili  2.4<H>  /  DBili      /  AST  28  /  ALT  45<H>  /  AlkPhos  99                            10.4   9.23  )-----------( 105      ( 2023 04:07 )             34.7       Urine Studies:  Urinalysis Basic - ( 2023 12:15 )    Color: Yellow / Appearance: Turbid / S.020 / pH:   Gluc:  / Ketone: Negative  / Bili: Negative / Urobili: <2 mg/dL   Blood:  / Protein: 300 mg/dL / Nitrite: Positive   Leuk Esterase: Large / RBC: 20 /HPF / WBC >720 /HPF   Sq Epi:  / Non Sq Epi:  / Bacteria: Moderate          Iron 13, TIBC 396, %sat 3      [23 @ 04:30]  Ferritin 56      [23 @ 04:30]  HbA1c 7.7      [20 @ 05:49]  TSH 5.61      [23 @ 05:10]  Lipid: chol 85, TG 38, HDL 37, LDL --      [23 @ 17:04]    HCV 0.15, Nonreact      [10-22-19 @ 07:00]  HIV Nonreact      [23 @ 11:27]    OBED: titer 1:80, pattern Speckled      [23 @ 06:08]  C3 Complement 90      [23 @ 10:40]  C4 Complement 11      [23 @ 10:40]  Syphilis Screen (Treponema Pallidum Ab) Negative      [09-10-20 @ 10:53]  Free Light Chains: kappa 11.08, lambda 4.40, ratio = 2.52      [ @ 17:00]  Immunofixation Serum:    Weak IgG Kappa Band Identified    Reference Range: None Detected      [21 @ 17:00]  Immunofixation Urine: Reference Range: None Detected      [21 @ 20:33]  UPEP Interpretation: Normal Electrophoresis Pattern      [21 @ 20:33]      RADIOLOGY & ADDITIONAL STUDIES:   Nephrology progress note    Patient is seen and examined, events over the last 24 h noted .  extubated     Allergies:  ACE inhibitors (Rash)  Entresto (Swelling)  Atorvastatin Calcium (Unknown)  Bactrim (Unknown)  Plavix (Unknown)    Hospital Medications:   MEDICATIONS  (STANDING):  aspirin enteric coated 81 milliGRAM(s) Oral daily  cefepime   IVPB 2000 milliGRAM(s) IV Intermittent every 24 hours  dexMEDEtomidine Infusion 0.2 MICROgram(s)/kG/Hr (3.25 mL/Hr) IV Continuous <Continuous>  DULoxetine 30 milliGRAM(s) Oral <User Schedule>  folic acid 1 milliGRAM(s) Oral <User Schedule>  glucagon  Injectable 1 milliGRAM(s) IntraMuscular once  heparin   Injectable 5000 Unit(s) SubCutaneous every 12 hours  hydrocortisone 2.5% Cream 1 Application(s) Topical two times a day  insulin lispro (ADMELOG) corrective regimen sliding scale   SubCutaneous three times a day before meals  lactulose Syrup 10 Gram(s) Oral at bedtime  levothyroxine 25 MICROGram(s) Oral <User Schedule>  levothyroxine 50 MICROGram(s) Oral <User Schedule>  mupirocin 2% Ointment 1 Application(s) Both Nostrils two times a day  norepinephrine Infusion 0.05 MICROgram(s)/kG/Min (6.09 mL/Hr) IV Continuous <Continuous>  pantoprazole    Tablet 40 milliGRAM(s) Oral before breakfast  polyethylene glycol 3350 17 Gram(s) Oral two times a day  prasugrel 10 milliGRAM(s) Oral daily  senna 2 Tablet(s) Oral at bedtime        VITALS:  T(F): 99.1 (23 @ 08:00), Max: 99.2 (23 @ 09:00)  HR: 109 (23 @ 08:00)  BP: 102/55 (23 @ 08:00)  RR: 20 (23 @ 08:00)  SpO2: 100% (23 @ 08:00)       @ 07:01  -   07:00  --------------------------------------------------------  IN: 1075.5 mL / OUT: 1297 mL / NET: -221.5 mL     @ 07:01  -   @ 07:00  --------------------------------------------------------  IN: 2172.6 mL / OUT: 2175 mL / NET: -2.4 mL     @ 07:01  -   @ 08:37  --------------------------------------------------------  IN: 0 mL / OUT: 100 mL / NET: -100 mL          PHYSICAL EXAM:  Constitutional: lethargic   Respiratory: CTAB,   Cardiovascular: S1, S2, RRR  Gastrointestinal: BS+, soft, NT/ND  Extremities: No cyanosis or clubbing. No peripheral edema  :  No fields.   Skin: No rashes    LABS:      144  |  105  |  34<H>  ----------------------------<  257<H>  4.1   |  31  |  1.4    Ca    8.5      2023 04:07  Mg     1.9     08    TPro  5.6<L>  /  Alb  2.9<L>  /  TBili  2.4<H>  /  DBili      /  AST  28  /  ALT  45<H>  /  AlkPhos  99  08                          10.4   9.23  )-----------( 105      ( 2023 04:07 )             34.7       Urine Studies:  Urinalysis Basic - ( 2023 12:15 )    Color: Yellow / Appearance: Turbid / S.020 / pH:   Gluc:  / Ketone: Negative  / Bili: Negative / Urobili: <2 mg/dL   Blood:  / Protein: 300 mg/dL / Nitrite: Positive   Leuk Esterase: Large / RBC: 20 /HPF / WBC >720 /HPF   Sq Epi:  / Non Sq Epi:  / Bacteria: Moderate          Iron 13, TIBC 396, %sat 3      [23 @ 04:30]  Ferritin 56      [23 @ 04:30]  HbA1c 7.7      [20 @ 05:49]  TSH 5.61      [23 @ 05:10]  Lipid: chol 85, TG 38, HDL 37, LDL --      [23 @ 17:04]    HCV 0.15, Nonreact      [10-22-19 @ 07:00]  HIV Nonreact      [23 @ 11:27]    OBED: titer 1:80, pattern Speckled      [23 @ 06:08]  C3 Complement 90      [23 @ 10:40]  C4 Complement 11      [23 @ 10:40]  Syphilis Screen (Treponema Pallidum Ab) Negative      [09-10-20 @ 10:53]  Free Light Chains: kappa 11.08, lambda 4.40, ratio = 2.52      [ @ 17:00]  Immunofixation Serum:    Weak IgG Kappa Band Identified    Reference Range: None Detected      [21 @ 17:00]  Immunofixation Urine: Reference Range: None Detected      [21 @ 20:33]  UPEP Interpretation: Normal Electrophoresis Pattern      [21 @ 20:33]      RADIOLOGY & ADDITIONAL STUDIES:

## 2023-06-08 NOTE — PROGRESS NOTE ADULT - SUBJECTIVE AND OBJECTIVE BOX
HPI:  67-year-old male w/ HFrEF 15-20% s/p ICD, decreased RV function, mild MR/TR, DM type I w/ neuropathy and hx hypoglycemia , DL, HTN, CAD, hx MI, s/p CABG, s/p stent (2018), hx in-stent thrombosis while on Plavix, PAD s/p 3 LLE stents, TIAGO (needs CPAP auto-regulating pressure 10-16, patient doesn't use it due to claustrophobia), Psoriasis, R-AKA presenting to ED s/p fall. He states he was lying in bed at home when he fell asleep and fell off the bed, and complains of generalized headache neck pain, non-radiating, no alleviating or aggravating factors, associated with right anterior chest wall pain. Denies LOC,  fevers, chills, nausea, vomiting, dizziness, abdominal pain, shortness of breath. States he had TDAP recently. Pt was not down for long as wife was upstairs when he fell off the bed and called EMS right away.     ED  Vital Signs  T(F):Max: 97.9   HR: 76 - 110  BP: 100/61 - 102/62  RR: 18   SpO2: 94% - 96% on room air     Labs significant for cr 1.8 (baseline ~ 1.2)  trop 0.10             (18 May 2023 14:16)    Currently admitted to medicine with the primary diagnosis of Acute HF (heart failure)       Today is hospital day 21d.     INTERVAL HPI / OVERNIGHT EVENTS:  Patient was examined and seen at bedside. This morning he is resting off sedation, no overnight events. Patient extubated this AM on BiPAP tolerating well. Hemodynamically stable, NPO pending SLP voiding appropriately, having regular BMs.     ROS: Otherwise unremarkable     PAST MEDICAL & SURGICAL HISTORY  Status post percutaneous transluminal coronary angioplasty  2 stents    Atherosclerosis of coronary artery  CAD (coronary artery disease)    Osteoarthritis    HLD (hyperlipidemia)    Diabetes  on insulin pump    CHF (congestive heart failure)  EF ~ 25%    History of celiac disease    Diverticulitis    STEMI (ST elevation myocardial infarction)    Diabetic neuropathy  Hands and Feet    Anxiety and depression    Other postprocedural status  Fixation hardware in foot LEFT    Stented coronary artery  10/18 heart attack  INFERIOR WALL MI    S/P CABG x 1  2018    S/P TKR (total knee replacement), right  with infection Mrsa   per pt he was cleared from MRSA infection    Surgery, elective  right knee wound debridement    History of open reduction and internal fixation (ORIF) procedure  right hip    H/O shoulder surgery  right    AICD (automatic cardioverter/defibrillator) present  St K[facility]    Cholecystostomy care  drain inserted 2020 & removed 4 months ago    History of tonsillectomy    History of hip replacement, total, right    Elective surgery  plastic surgery Left shin      ALLERGIES  ACE inhibitors (Rash)  Entresto (Swelling)  Atorvastatin Calcium (Unknown)  Bactrim (Unknown)  Plavix (Unknown)    MEDICATIONS  STANDING MEDICATIONS  aspirin enteric coated 81 milliGRAM(s) Oral daily  chlorhexidine 0.12% Liquid 15 milliLiter(s) Oral Mucosa every 12 hours  chlorhexidine 2% Cloths 1 Application(s) Topical daily  dextrose 5%. 1000 milliLiter(s) IV Continuous <Continuous>  dextrose 5%. 1000 milliLiter(s) IV Continuous <Continuous>  dextrose 50% Injectable 25 Gram(s) IV Push once  dextrose 50% Injectable 12.5 Gram(s) IV Push once  dextrose 50% Injectable 25 milliLiter(s) IV Push once  dextrose 50% Injectable 25 Gram(s) IV Push once  DULoxetine 30 milliGRAM(s) Oral <User Schedule>  folic acid 1 milliGRAM(s) Oral <User Schedule>  glucagon  Injectable 1 milliGRAM(s) IntraMuscular once  heparin   Injectable 5000 Unit(s) SubCutaneous every 12 hours  hydrocortisone 2.5% Cream 1 Application(s) Topical two times a day  insulin lispro (ADMELOG) corrective regimen sliding scale   SubCutaneous three times a day before meals  levothyroxine 25 MICROGram(s) Oral <User Schedule>  levothyroxine 50 MICROGram(s) Oral <User Schedule>  meropenem  IVPB 2000 milliGRAM(s) IV Intermittent every 12 hours  mupirocin 2% Ointment 1 Application(s) Both Nostrils two times a day  pantoprazole  Injectable 40 milliGRAM(s) IV Push daily  polyethylene glycol 3350 17 Gram(s) Oral two times a day  prasugrel 10 milliGRAM(s) Oral daily  senna 2 Tablet(s) Oral at bedtime    PRN MEDICATIONS  acetaminophen     Tablet .. 325 milliGRAM(s) Oral every 4 hours PRN  dextrose Oral Gel 15 Gram(s) Oral once PRN    VITALS:  T(F): 99.1  HR: 100  BP: 99/58  RR: 17  SpO2: 100%    PHYSICAL EXAM  GEN: NAD, alert, awake, cooperative  PULM: Clear breath sounds b/l, No wheezing, rales, rhonchi, extubated on BiPAP  CVS: Regular rate and rhythm, S1-S2, no murmurs, heaves, thrills, R IJ cordis C/D/I  ABD: Soft, non-tender, non-distended, no guarding, BS +, fields w/ clear yellow urine   EXT: R AKA, LLE erythematous up to mid calf, distal pulse not palpable, no swelling/edema, multiple ischemic/necrotic ulcerations, warm/dry, L femoral udal catheter c/d/i  NEURO: AAOx3, follows commands     LABS                        10.4   9.23  )-----------( 105      ( 08 Jun 2023 04:07 )             34.7     06-08    144  |  105  |  34<H>  ----------------------------<  257<H>  4.1   |  31  |  1.4    Ca    8.5      08 Jun 2023 04:07  Mg     1.9     06-08    TPro  5.6<L>  /  Alb  2.9<L>  /  TBili  2.4<H>  /  DBili  x   /  AST  28  /  ALT  45<H>  /  AlkPhos  99  06-08        ABG - ( 08 Jun 2023 10:46 )  pH, Arterial: 7.48  pH, Blood: x     /  pCO2: 48    /  pO2: 168   / HCO3: 36    / Base Excess: 10.9  /  SaO2: 99.2              Lactate, Blood: 1.3 mmol/L (06-08-23 @ 04:07)      Culture - Blood (collected 06 Jun 2023 11:46)  Source: .Blood None  Preliminary Report (07 Jun 2023 22:01):    No growth to date.    Culture - Urine (collected 05 Jun 2023 12:15)  Source: Catheterized Catheterized  Final Report (08 Jun 2023 10:34):    >100,000 CFU/ml Klebsiella pneumoniae ESBL Multiple Morphological Strains  Organism: Klebsiella pneumoniae ESBL  Klebsiella pneumoniae ESBL (08 Jun 2023 10:34)  Organism: Klebsiella pneumoniae ESBL (08 Jun 2023 10:34)  Organism: Klebsiella pneumoniae ESBL (08 Jun 2023 10:34)            RADIOLOGY     INTERVAL HPI / OVERNIGHT EVENTS:  Patient was examined and seen at bedside. This morning he is resting off sedation, no overnight events. Patient extubated this AM on BiPAP tolerating well. Hemodynamically stable, NPO pending SLP voiding appropriately, having regular BMs.     ROS: Otherwise unremarkable     PAST MEDICAL & SURGICAL HISTORY  Status post percutaneous transluminal coronary angioplasty  2 stents    Atherosclerosis of coronary artery  CAD (coronary artery disease)    Osteoarthritis    HLD (hyperlipidemia)    Diabetes  on insulin pump    CHF (congestive heart failure)  EF ~ 25%    History of celiac disease    Diverticulitis    STEMI (ST elevation myocardial infarction)    Diabetic neuropathy  Hands and Feet    Anxiety and depression    Other postprocedural status  Fixation hardware in foot LEFT    Stented coronary artery  10/18 heart attack  INFERIOR WALL MI    S/P CABG x 1  2018    S/P TKR (total knee replacement), right  with infection Mrsa   per pt he was cleared from MRSA infection    Surgery, elective  right knee wound debridement    History of open reduction and internal fixation (ORIF) procedure  right hip    H/O shoulder surgery  right    AICD (automatic cardioverter/defibrillator) present  St Kali    Cholecystostomy care  drain inserted 2020 & removed 4 months ago    History of tonsillectomy    History of hip replacement, total, right    Elective surgery  plastic surgery Left shin      ALLERGIES  ACE inhibitors (Rash)  Entresto (Swelling)  Atorvastatin Calcium (Unknown)  Bactrim (Unknown)  Plavix (Unknown)    MEDICATIONS  STANDING MEDICATIONS  aspirin enteric coated 81 milliGRAM(s) Oral daily  chlorhexidine 0.12% Liquid 15 milliLiter(s) Oral Mucosa every 12 hours  chlorhexidine 2% Cloths 1 Application(s) Topical daily  dextrose 5%. 1000 milliLiter(s) IV Continuous <Continuous>  dextrose 5%. 1000 milliLiter(s) IV Continuous <Continuous>  dextrose 50% Injectable 25 Gram(s) IV Push once  dextrose 50% Injectable 12.5 Gram(s) IV Push once  dextrose 50% Injectable 25 milliLiter(s) IV Push once  dextrose 50% Injectable 25 Gram(s) IV Push once  DULoxetine 30 milliGRAM(s) Oral <User Schedule>  folic acid 1 milliGRAM(s) Oral <User Schedule>  glucagon  Injectable 1 milliGRAM(s) IntraMuscular once  heparin   Injectable 5000 Unit(s) SubCutaneous every 12 hours  hydrocortisone 2.5% Cream 1 Application(s) Topical two times a day  insulin lispro (ADMELOG) corrective regimen sliding scale   SubCutaneous three times a day before meals  levothyroxine 25 MICROGram(s) Oral <User Schedule>  levothyroxine 50 MICROGram(s) Oral <User Schedule>  meropenem  IVPB 2000 milliGRAM(s) IV Intermittent every 12 hours  mupirocin 2% Ointment 1 Application(s) Both Nostrils two times a day  pantoprazole  Injectable 40 milliGRAM(s) IV Push daily  polyethylene glycol 3350 17 Gram(s) Oral two times a day  prasugrel 10 milliGRAM(s) Oral daily  senna 2 Tablet(s) Oral at bedtime    PRN MEDICATIONS  acetaminophen     Tablet .. 325 milliGRAM(s) Oral every 4 hours PRN  dextrose Oral Gel 15 Gram(s) Oral once PRN    VITALS:  T(F): 99.1  HR: 100  BP: 99/58  RR: 17  SpO2: 100%    PHYSICAL EXAM  GEN: NAD, alert, awake, cooperative  PULM: Clear breath sounds b/l, No wheezing, rales, rhonchi, extubated on BiPAP  CVS: Regular rate and rhythm, S1-S2, no murmurs, heaves, thrills, R IJ cordis C/D/I  ABD: Soft, non-tender, non-distended, no guarding, BS +, fields w/ clear yellow urine   EXT: R AKA, LLE erythematous up to mid calf, distal pulse not palpable, no swelling/edema, multiple ischemic/necrotic ulcerations, warm/dry, L femoral udal catheter c/d/i  NEURO: AAOx3, follows commands     LABS                        10.4   9.23  )-----------( 105      ( 08 Jun 2023 04:07 )             34.7     06-08    144  |  105  |  34<H>  ----------------------------<  257<H>  4.1   |  31  |  1.4    Ca    8.5      08 Jun 2023 04:07  Mg     1.9     06-08    TPro  5.6<L>  /  Alb  2.9<L>  /  TBili  2.4<H>  /  DBili  x   /  AST  28  /  ALT  45<H>  /  AlkPhos  99  06-08        ABG - ( 08 Jun 2023 10:46 )  pH, Arterial: 7.48  pH, Blood: x     /  pCO2: 48    /  pO2: 168   / HCO3: 36    / Base Excess: 10.9  /  SaO2: 99.2              Lactate, Blood: 1.3 mmol/L (06-08-23 @ 04:07)      Culture - Blood (collected 06 Jun 2023 11:46)  Source: .Blood None  Preliminary Report (07 Jun 2023 22:01):    No growth to date.    Culture - Urine (collected 05 Jun 2023 12:15)  Source: Catheterized Catheterized  Final Report (08 Jun 2023 10:34):    >100,000 CFU/ml Klebsiella pneumoniae ESBL Multiple Morphological Strains  Organism: Klebsiella pneumoniae ESBL  Klebsiella pneumoniae ESBL (08 Jun 2023 10:34)  Organism: Klebsiella pneumoniae ESBL (08 Jun 2023 10:34)  Organism: Klebsiella pneumoniae ESBL (08 Jun 2023 10:34)            RADIOLOGY

## 2023-06-08 NOTE — PROGRESS NOTE ADULT - SUBJECTIVE AND OBJECTIVE BOX
Date of Admission: 23    Interval History: Patient extubated today, off pressors and inotropic support, PA catheter removed. BP is 90s. Renal function improved.       CHIEF COMPLAINT: Patient is a 67y old  Male who presents with a chief complaint of Mechanical fall (22 May 2023 12:56)    HISTORY OF PRESENT ILLNESS: 67-year-old male w/ HFrEF 15-20% s/p ICD, decreased RV function, mild MR/TR, DM type I w/ neuropathy and hx hypoglycemia , DL, HTN, CAD, hx MI, s/p CABG, s/p stent (), hx in-stent thrombosis while on Plavix, PAD s/p 3 LLE stents, TIAGO (needs CPAP auto-regulating pressure 10-16, patient doesn't use it due to claustrophobia), Psoriasis, R-AKA presenting to ED s/p fall. He states he was lying in bed at home when he fell asleep and fell off the bed, and complains of generalized headache neck pain, non-radiating, no alleviating or aggravating factors, associated with right anterior chest wall pain. Denies LOC,  fevers, chills, nausea, vomiting, dizziness, abdominal pain, shortness of breath. States he had TDAP recently. Pt was not down for long as wife was upstairs when he fell off the bed and called EMS right away.   Labs significant for cr 1.8 (baseline ~ 1.2)  trop 0.10 (18 May 2023 14:16)      PAST MEDICAL & SURGICAL HISTORY:  Status post percutaneous transluminal coronary angioplasty  2 stents  Atherosclerosis of coronary artery  CAD (coronary artery disease)  Osteoarthritis  HLD (hyperlipidemia)  Diabetes  on insulin pump  CHF (congestive heart failure)  EF ~ 25%  History of celiac disease  Diverticulitis  STEMI (ST elevation myocardial infarction)  Diabetic neuropathy  Hands and Feet  Anxiety and depression  Other postprocedural status  Fixation hardware in foot LEFT  Stented coronary artery  10/18 heart attack  INFERIOR WALL MI  S/P CABG x 1    S/P TKR (total knee replacement), right  with infection Mrsa  per pt he was cleared from MRSA infection  Surgery, elective  right knee wound debridement  History of open reduction and internal fixation (ORIF) procedure  right hip  H/O shoulder surgery  right  AICD (automatic cardioverter/defibrillator) present  St Kali  Cholecystostomy care  drain inserted  & removed 4 months ago  History of tonsillectomy  History of hip replacement, total, right  Elective surgery  plastic surgery Left shin      FAMILY HISTORY: Patient was adopted, family history unknown to him  SOCIAL HISTORY:  Denies smoking, alcohol, or drug use      Allergies  ACE inhibitors (Rash)  Entresto (Swelling)  Atorvastatin Calcium (Unknown)  Bactrim (Unknown)  Plavix (Unknown)    Intolerances      PHYSICAL EXAM:  ICU Vital Signs Last 24 Hrs  T(C): 37.1 (2023 16:00), Max: 37.6 (2023 08:00)  T(F): 98.8 (2023 16:00), Max: 99.7 (2023 08:00)  HR: 102 (2023 16:50) (93 - 118)  BP: --  BP(mean): --  ABP: 113/52 (2023 16:00) (28/3 - 137/59)  ABP(mean): 68 (2023 16:00) (22 - 94)  RR: 20 (2023 16:00) (20 - 27)  SpO2: 100% (2023 16:50) (100% - 100%)    O2 Parameters below as of 2023 16:00  Patient On (Oxygen Delivery Method): ventilator    O2 Concentration (%): 40        General Appearance: intubated, drowsy, biting ET tube  Neck: +SWAN catheter  Cardiovascular: regular rate and rhythm S1 S2, No edema  Respiratory: b/l breath sounds, MV  Psychiatry: drowsy, appears mildly agitated  Gastrointestinal:  Soft, +BS  Skin/Integumentary: No rashes, No ecchymoses, No cyanosis	  Musculoskeletal/ extremities: R AKA, No clubbing, cyanosis or edema  Vascular: Peripheral pulses palpable 2+ B/L UE         LABS:	 	    CBC Full  -  ( 2023 04:55 )  WBC Count : 11.14 K/uL  RBC Count : 4.92 M/uL  Hemoglobin : 10.7 g/dL  Hematocrit : 36.6 %  Platelet Count - Automated : 120 K/uL  Mean Cell Volume : 74.4 fL  Mean Cell Hemoglobin : 21.7 pg  Mean Cell Hemoglobin Concentration : 29.2 g/dL  Auto Neutrophil # : 9.51 K/uL  Auto Lymphocyte # : 0.39 K/uL  Auto Monocyte # : 1.08 K/uL  Auto Eosinophil # : 0.07 K/uL  Auto Basophil # : 0.01 K/uL  Auto Neutrophil % : 85.4 %  Auto Lymphocyte % : 3.5 %  Auto Monocyte % : 9.7 %  Auto Eosinophil % : 0.6 %  Auto Basophil % : 0.1 %    Basic Metabolic Panel (23 @ 16:20)    Sodium, Serum: 145 mmol/L   Potassium, Serum: 3.5 mmol/L   Chloride, Serum: 104 mmol/L   Carbon Dioxide, Serum: 30 mmol/L   Anion Gap, Serum: 11 mmol/L   Blood Urea Nitrogen, Serum: 43 mg/dL   Creatinine, Serum: 2.0 mg/dL   Glucose, Serum: 249 mg/dL   Calcium, Total Serum: 8.4 mg/dL   eGFR: 36: The estimated glomerular filtration rate (eGFR) is calculated using the   CKD-EPI creatinine equation, which does not have a coefficient for  race and is validated in individuals 18 years of age and older (N Engl J  Med ; [id number]). Creatinine-based eGFR may be inaccurate in  various situations including but not limited to extremes of muscle mass,  altered dietary protein intake, or medications that affect renal tubular  creatinine secretion. mL/min/1.73m2      CARDIAC MARKERS:  Pro-Brain Natriuretic Peptide: 9132 pg/mL (23 @ 07:01)        TELEMETRY EVENTS: 	    EC23    Ventricular Rate 79 BPM  Atrial Rate 79 BPM  P-R Interval 170 ms  QRS Duration 158 ms  Q-T Interval 480 ms  QTC Calculation(Bazett) 550 ms  P Axis 51 degrees  R Axis -35 degrees  T Axis 65 degrees    Diagnosis Line Atrial-sensed ventricular-paced rhythm  Abnormal ECG    Confirmed by London Santos ([id number]) on 2023 4:12:01 PM      PREVIOUS DIAGNOSTIC TESTING:    TTE 23    Summary:   1. Left ventricular ejection fraction, by visual estimation, is <20%.   2. Severely decreased global left ventricular systolic function.   3. Mildly increased LV wall thickness.   4. Severe concentric left ventricular hypertrophy.   5. Spectral Doppler shows restrictive pattern of left ventricular   myocardial filling (Grade III diastolic dysfunction).   6. Moderately enlarged right ventricle.   7. Moderately reduced RV systolic function.   8. Elevated mean left atrial pressure.   9. Moderately enlarged left atrium.  10.Moderate mitral valve regurgitation.  11. The mitral valve leaflets are tethered which is due to reduced   systolic function and elevated LVDP.  12. Moderate-severe tricuspid regurgitation.  13. Estimated pulmonary artery systolic pressure is 48.4 mmHg assuming a   right atrial pressure of 8 mmHg, which is consistent with mild pulmonary   hypertension.  14. Patient is in normal sinus rhythm.  15. Compared to the prior TTE dated 21, the patient's cardiomyopathy   has worsened.      Left Heart Catheterization: 18    IMPRESSIONS: There is significant single vessel native coronary artery  disease. Patent LIMA to LAD. No significant restenosis in RCA/OM1.    	    Home Medications:  aspirin 81 mg oral delayed release tablet: 1 tab(s) orally once a day (2023 02:35)  diazePAM 2 mg oral tablet: 0.5 tab(s) orally once a day (at bedtime) (2023 02:35)  DULoxetine 30 mg oral delayed release capsule: 1 cap(s) orally 3 times a day (:35)  insulin glargine 100 units/mL subcutaneous solution: 25 unit(s) subcutaneous once a day (at bedtime) (2023 02:35)  insulin glargine 100 units/mL subcutaneous solution: 40 microcurie subcutaneous once a day (2023 02:35)  insulin lispro 100 units/mL injectable solution: if your finger stick is 150-199 please inject 2 units  if your finger stick is 200-250 please inject 4 units  if your finger stick is 251-300 please inject 6 units  if your finger stick is 301-350 please inject 8 units  if your finger stick is more than 350 please call your physician    (2023 02:35)  levothyroxine 25 mcg (0.025 mg) oral tablet: 1 tab(s) orally 6 times a week (2023 02:35)  levothyroxine 50 mcg (0.05 mg) oral tablet: 1 tab(s) orally once a week (2023 02:35)  metoprolol succinate 25 mg oral tablet, extended release: 1 tab(s) orally once a day (2023 02:35)  Multiple Vitamins oral tablet: 1 tab(s) orally once a day (2023 02:35)  pantoprazole 40 mg oral delayed release tablet: 1 tab(s) orally once a day (before a meal) (2023 02:35)  prasugrel 10 mg oral tablet: 1 tab(s) orally once a day (2023 02:35)  rosuvastatin 40 mg oral tablet: 1 tab(s) orally once a day (2023 02:35)  spironolactone 25 mg oral tablet: 1 tab(s) orally once a day (2023 02:35)        MEDICATIONS  (STANDING):  aspirin enteric coated 81 milliGRAM(s) Oral daily  dextrose 5% + sodium chloride 0.9%. 1000 milliLiter(s) (50 mL/Hr) IV Continuous <Continuous>  dextrose 5%. 1000 milliLiter(s) (100 mL/Hr) IV Continuous <Continuous>  dextrose 5%. 1000 milliLiter(s) (50 mL/Hr) IV Continuous <Continuous>  dextrose 50% Injectable 25 Gram(s) IV Push once  dextrose 50% Injectable 12.5 Gram(s) IV Push once  dextrose 50% Injectable 25 Gram(s) IV Push once  diazepam    Tablet 1 milliGRAM(s) Oral at bedtime  DULoxetine 30 milliGRAM(s) Oral <User Schedule>  furosemide   Injectable 20 milliGRAM(s) IV Push two times a day  glucagon  Injectable 1 milliGRAM(s) IntraMuscular once  heparin   Injectable 5000 Unit(s) SubCutaneous every 12 hours  hydrocortisone 2.5% Cream 1 Application(s) Topical two times a day  insulin glargine Injectable (LANTUS) 12 Unit(s) SubCutaneous at bedtime  insulin lispro (ADMELOG) corrective regimen sliding scale   SubCutaneous three times a day before meals  lactulose Syrup 10 Gram(s) Oral at bedtime  levothyroxine 25 MICROGram(s) Oral <User Schedule>  levothyroxine 50 MICROGram(s) Oral <User Schedule>  metoprolol succinate ER 25 milliGRAM(s) Oral daily  midodrine. 10 milliGRAM(s) Oral three times a day  pantoprazole    Tablet 40 milliGRAM(s) Oral before breakfast  polyethylene glycol 3350 17 Gram(s) Oral two times a day  prasugrel 10 milliGRAM(s) Oral daily  senna 2 Tablet(s) Oral at bedtime    MEDICATIONS  (PRN):  dextrose Oral Gel 15 Gram(s) Oral once PRN Blood Glucose LESS THAN 70 milliGRAM(s)/deciliter

## 2023-06-08 NOTE — PROGRESS NOTE ADULT - ASSESSMENT
67y M pmh HFrEF 15-20% s/p ICD, decreased RV function, mild MR/TR, DM type I w/ neuropathy, hx hypoglycemia , DLD, HTN, CAD, hx MI, s/p CABG, s/p stent (2018), hx in-stent thrombosis while on Plavix, PAD s/p 3 LLE stents, TIAGO (needs CPAP auto-regulating pressure 10-16, patient doesn't use it due to claustrophobia), Psoriasis, R-AKA presenting to ED s/p fall admitted for trauma w/u c/b acute hypoxic respiratory failure 2/2 septic vs cardiogenic shock.     Acute hypoxemic/hypercapnia respiratory failure   Septic/Cardiogenic Shock 2/2 ESBL Klebsiella UTI  HFrEF, ICM s/p AICD   CAD s/p CABG s/p PCI  MOISES on CKD improving  Thrombocytopenia  Transaminitis  Iron deficiency anemia   Complicated DMI w/ neuropathy  Psoriasis  PVD s/p R AKA, LLE stenting x3  s/p mechanical fall  Hx LLE in-stent thrombosis while on Plavix  TIAGO      PLAN:    CNS:   - off sedation  - CTH, EEG -ve    HEENT:  Oral care    PULMONARY  - s/p extubation 6/8 AM - post ABG on BiPAP 7.48/48/168/36  - HOB @ 45 degrees, keep Sao2 92 to 96%  - Aspiration precautions   - wean BiPAP to NC as tolerated     CARDIOVASCULAR:   - TTE 5/24: EF 20%, global cardiomyopathy, mod MR, mod-sev TR  - trop 0.23>0.16  - off inotropes/pressure support   - repeat hemodynamics   - holding diuretics   - strict I/Os   - A-line removed 6/8  - [place] removed 6/8  - R IJ cordis in place     GI  - NPO pending SLP eval   - LFTs improving, ALP 99, AST/ALT 28/45  - trend LFTS  - c/w senna 2 tab qhs, miralax bid, lactulose d/c   - c/w GI ppx 40mg qD    RENAL  - Na 144 d/c IVF and free water flush   - Cr 1.1>3.5>2.5>1.4  reported bsl CKD III  - UA +ve protein, nitrite, LE, blood, wbc, bacteria  - UCX +ve ESBL klebsiella sensitive to Meropenem  - KBUS -ve hydronephrosis   - Net I/O +75  - trend BMP qD, correct electrolytes as needed  - strict I/Os c/w fields     INFECTIOUS DISEASE  - off vancomymin  - MRSA nares +ve - mupirocin BID x5d 6/7-6/11  - UA positive, Bcx -ve 6/6, UCx +ve ESBL Klebsiella 6/5  - d/c cefepime, started meropenem 2g IV q12hr   - ID consult placed     HEMATOLOGICAL  - c/w heparin 5k BID  - PAD LLE - arterial duplex adequate flow,-ve DVT  - c/w prasugrel 10mg po qD, ASA 81mg qD  - hgb stable ~10, mcv 75, serum iron low, %sat low, ferritin wnl s/p venofer x5d  - >120>105 continue to monitor  - c/w folate 1mg S/M/T/Th/F/Sa    ENDOCRINE:   - FS goal 140-180, c/w ISS, restart insulin once on po feeding   - c/w levothyroxine 25mcg M/T/W/Th/F/Sa, 50mcg Dueñas  - c/w MTX 10mg qW    # To follow up  - f/u ID consult   - repeat hemodynamics off pressure support  - trend LFTs/PLT, Cr, monitor UOP   - SLP for dysphagia screen    # Misc  - DVT Prophylaxis: heparin   Injectable  - GI Prophylaxis: pantoprazole    Tablet 40 milliGRAM(s) Oral before breakfast  - Diet: Diet, NPO  - Activity: Activity - IAT  - Code Status: Full

## 2023-06-08 NOTE — PROGRESS NOTE ADULT - ASSESSMENT
IMPRESSION:    Acute hypoxemic and hypercapnia resp failure on NRM  Pul edema improved   HFrEF  Lactic acidosis  septic/ cardiogenic shock   Acute renal failure  possible bacterial superinfection  DM  Psoriasis  PVD  TIAGO  UTI   sepsis       PLAN:    CNS:    do SAT   HEENT:  Oral care    PULMONARY:  HOB @ 45 degrees,  on NIV   ABG in 1hr  monitor CO2 keep around 52   ABG will need intubation, keep Sao2 92 to 96%      CARDIOVASCULAR: cardio fu,  amor pressors   keep is = os   off diuretics   GI: GI prophylaxis     NPO                            RENAL:  F/u  lytes.  Correct as needed. accurate I/O, fields, renal US  consider IV fluid D51/2 ns 50cc/ hr while   INFECTIOUS DISEASE: cefepime, procal, pancx, fungitell    HEMATOLOGICAL:  DVT prophylaxis. LE doppler    ENDOCRINE:  Follow up FS.  Insulin protocol if needed. cortisol level      very poor prognosis    MICU care as per cardiology care as per cardiology

## 2023-06-08 NOTE — PROGRESS NOTE ADULT - ASSESSMENT
Assessment: 67-year-old male w/ HFrEF 15-20% s/p ICD, DM type I, DL, HTN, CAD, hx MI, s/p CABG, s/p stent (2018), hx in-stent thrombosis while on Plavix, PAD s/p 3 LLE stents, TIAGO non-compliant w/ CPAP, R-AKA presenting to ED s/p fall (mechanical). Patient found to be fluid overloaded with MOISES, heart failure consulted for further management of ADHF. Hospital course c/b AMS, elevated lactate with worsening renal/liver function. Patient was intubated 6/05 for airway protection, started empirically on inotropic support with good urine response + improving lactate. Patient was transferred to CCU for SWAN catheter placement for closer hemodynamic monitoring. Of note, patient also found to have +UA w/puss noted in fields bag.       Plan:    Monitor Lactate and central saturation   Strict I/Os   Continue Abx for septic shock   SLP evaluation   PT evaluation   OOB to chair   Plan discussed with CCU team  Will continue to monitor

## 2023-06-09 NOTE — PROGRESS NOTE ADULT - SUBJECTIVE AND OBJECTIVE BOX
Date of Admission: 23    Interval History: Patient s/p extubation yesterday, remains off pressors and inotropic support. BP soft. Patient is currently awake and alert, oriented to self only. 1.2L 24hr UOP. Kidney function improving.     CHIEF COMPLAINT: Patient is a 67y old  Male who presents with a chief complaint of Mechanical fall (22 May 2023 12:56)    HISTORY OF PRESENT ILLNESS: 67-year-old male w/ HFrEF 15-20% s/p ICD, decreased RV function, mild MR/TR, DM type I w/ neuropathy and hx hypoglycemia , DL, HTN, CAD, hx MI, s/p CABG, s/p stent (), hx in-stent thrombosis while on Plavix, PAD s/p 3 LLE stents, TIAGO (needs CPAP auto-regulating pressure 10-, patient doesn't use it due to claustrophobia), Psoriasis, R-AKA presenting to ED s/p fall. He states he was lying in bed at home when he fell asleep and fell off the bed, and complains of generalized headache neck pain, non-radiating, no alleviating or aggravating factors, associated with right anterior chest wall pain. Denies LOC,  fevers, chills, nausea, vomiting, dizziness, abdominal pain, shortness of breath. States he had TDAP recently. Pt was not down for long as wife was upstairs when he fell off the bed and called EMS right away.   Labs significant for cr 1.8 (baseline ~ 1.2)  trop 0.10 (18 May 2023 14:16)      PAST MEDICAL & SURGICAL HISTORY:  Status post percutaneous transluminal coronary angioplasty  2 stents  Atherosclerosis of coronary artery  CAD (coronary artery disease)  Osteoarthritis  HLD (hyperlipidemia)  Diabetes  on insulin pump  CHF (congestive heart failure)  EF ~ 25%  History of celiac disease  Diverticulitis  STEMI (ST elevation myocardial infarction)  Diabetic neuropathy  Hands and Feet  Anxiety and depression  Other postprocedural status  Fixation hardware in foot LEFT  Stented coronary artery  10/18 heart attack  INFERIOR WALL MI  S/P CABG x 1    S/P TKR (total knee replacement), right  with infection Mrsa  per pt he was cleared from MRSA infection  Surgery, elective  right knee wound debridement  History of open reduction and internal fixation (ORIF) procedure  right hip  H/O shoulder surgery  right  AICD (automatic cardioverter/defibrillator) present  [facility] [place]  Cholecystostomy care  drain inserted  & removed 4 months ago  History of tonsillectomy  History of hip replacement, total, right  Elective surgery  plastic surgery Left shin      FAMILY HISTORY: Patient was adopted, family history unknown to him  SOCIAL HISTORY:  Denies smoking, alcohol, or drug use      Allergies  ACE inhibitors (Rash)  Entresto (Swelling)  Atorvastatin Calcium (Unknown)  Bactrim (Unknown)  Plavix (Unknown)    Intolerances      PHYSICAL EXAM:  ICU Vital Signs Last 24 Hrs  T(C): 36.1 (2023 12:00), Max: 36.8 (2023 00:00)  T(F): 97 (2023 12:00), Max: 98.2 (2023 00:00)  HR: 104 (2023 12:00) (104 - 114)  BP: 89/55 (2023 12:00) (83/44 - 102/58)  BP(mean): 68 (2023 12:00) (57 - 78)  ABP: --  ABP(mean): --  RR: 30 (2023 12:00) (17 - 36)  SpO2: 94% (2023 12:00) (92% - 100%)    O2 Parameters below as of 2023 12:00  Patient On (Oxygen Delivery Method): room air        General Appearance: awake, low volume voice s/p extubation   Cardiovascular: regular rate and rhythm S1 S2, No edema  HEENT: low volume voice, throat irritation s/p extubation  Respiratory: course breath sounds  Psychiatry: awake, alert oriented x1   Gastrointestinal:  Soft, +BS  Skin/Integumentary: No rashes, No ecchymoses, No cyanosis	  Musculoskeletal/ extremities: R AKA, No clubbing, cyanosis or edema  Vascular: Peripheral pulses palpable 2+ B/L UE         LABS:	 	    CBC Full  -  ( 2023 08:06 )  WBC Count : 6.43 K/uL  RBC Count : 4.38 M/uL  Hemoglobin : 9.7 g/dL  Hematocrit : 33.7 %  Platelet Count - Automated : 101 K/uL  Mean Cell Volume : 76.9 fL  Mean Cell Hemoglobin : 22.1 pg  Mean Cell Hemoglobin Concentration : 28.8 g/dL  Auto Neutrophil # : 5.51 K/uL  Auto Lymphocyte # : 0.44 K/uL  Auto Monocyte # : 0.36 K/uL  Auto Eosinophil # : 0.09 K/uL  Auto Basophil # : 0.01 K/uL  Auto Neutrophil % : 85.7 %  Auto Lymphocyte % : 6.8 %  Auto Monocyte % : 5.6 %  Auto Eosinophil % : 1.4 %  Auto Basophil % : 0.2 %    Comprehensive Metabolic Panel (23 @ 08:06)    Sodium, Serum: 150 mmol/L   Potassium, Serum: 3.9 mmol/L   Chloride, Serum: 108 mmol/L   Carbon Dioxide, Serum: 33 mmol/L   Anion Gap, Serum: 9 mmol/L   Blood Urea Nitrogen, Serum: 26 mg/dL   Creatinine, Serum: 1.1 mg/dL   Glucose, Serum: 208 mg/dL   Calcium, Total Serum: 8.4 mg/dL   Protein Total, Serum: 5.5 g/dL   Albumin, Serum: 2.9 g/dL   Bilirubin Total, Serum: 2.4 mg/dL   Alkaline Phosphatase, Serum: 93 U/L   Aspartate Aminotransferase (AST/SGOT): 23 U/L   Alanine Aminotransferase (ALT/SGPT): 34 U/L   eGFR: 74: The estimated glomerular filtration rate (eGFR) is calculated using the   CKD-EPI creatinine equation, which does not have a coefficient for  race and is validated in individuals 18 years of age and older (N Engl J  Med ; [id number]:[id number]). Creatinine-based eGFR may be inaccurate in  various situations including but not limited to extremes of muscle mass,  altered dietary protein intake, or medications that affect renal tubular  creatinine secretion. mL/min/1.73m2      CARDIAC MARKERS:  Pro-Brain Natriuretic Peptide: 9132 pg/mL (23 @ 07:01)        TELEMETRY EVENTS: 	    EC23    Ventricular Rate 79 BPM  Atrial Rate 79 BPM  P-R Interval 170 ms  QRS Duration 158 ms  Q-T Interval 480 ms  QTC Calculation(Bazett) 550 ms  P Axis 51 degrees  R Axis -35 degrees  T Axis 65 degrees    Diagnosis Line Atrial-sensed ventricular-paced rhythm  Abnormal ECG    Confirmed by London Santos ([id number]) on 2023 4:12:01 PM      PREVIOUS DIAGNOSTIC TESTING:    TTE 23    Summary:   1. Left ventricular ejection fraction, by visual estimation, is <20%.   2. Severely decreased global left ventricular systolic function.   3. Mildly increased LV wall thickness.   4. Severe concentric left ventricular hypertrophy.   5. Spectral Doppler shows restrictive pattern of left ventricular   myocardial filling (Grade III diastolic dysfunction).   6. Moderately enlarged right ventricle.   7. Moderately reduced RV systolic function.   8. Elevated mean left atrial pressure.   9. Moderately enlarged left atrium.  10.Moderate mitral valve regurgitation.  11. The mitral valve leaflets are tethered which is due to reduced   systolic function and elevated LVDP.  12. Moderate-severe tricuspid regurgitation.  13. Estimated pulmonary artery systolic pressure is 48.4 mmHg assuming a   right atrial pressure of 8 mmHg, which is consistent with mild pulmonary   hypertension.  14. Patient is in normal sinus rhythm.  15. Compared to the prior TTE dated 21, the patient's cardiomyopathy   has worsened.      Left Heart Catheterization: 18    IMPRESSIONS: There is significant single vessel native coronary artery  disease. Patent LIMA to LAD. No significant restenosis in RCA/OM1.    	    Home Medications:  aspirin 81 mg oral delayed release tablet: 1 tab(s) orally once a day (2023 02:35)  diazePAM 2 mg oral tablet: 0.5 tab(s) orally once a day (at bedtime) (2023 02:35)  DULoxetine 30 mg oral delayed release capsule: 1 cap(s) orally 3 times a day (2023 02:35)  insulin glargine 100 units/mL subcutaneous solution: 25 unit(s) subcutaneous once a day (at bedtime) (2023 02:35)  insulin glargine 100 units/mL subcutaneous solution: 40 microcurie subcutaneous once a day (2023 02:35)  insulin lispro 100 units/mL injectable solution: if your finger stick is 150-199 please inject 2 units  if your finger stick is 200-250 please inject 4 units  if your finger stick is 251-300 please inject 6 units  if your finger stick is 301-350 please inject 8 units  if your finger stick is more than 350 please call your physician    (2023 02:35)  levothyroxine 25 mcg (0.025 mg) oral tablet: 1 tab(s) orally 6 times a week (2023 02:35)  levothyroxine 50 mcg (0.05 mg) oral tablet: 1 tab(s) orally once a week (2023 02:35)  metoprolol succinate 25 mg oral tablet, extended release: 1 tab(s) orally once a day (2023 02:35)  Multiple Vitamins oral tablet: 1 tab(s) orally once a day (2023 02:35)  pantoprazole 40 mg oral delayed release tablet: 1 tab(s) orally once a day (before a meal) (2023 02:35)  prasugrel 10 mg oral tablet: 1 tab(s) orally once a day (2023 02:35)  rosuvastatin 40 mg oral tablet: 1 tab(s) orally once a day (2023 02:35)  spironolactone 25 mg oral tablet: 1 tab(s) orally once a day (2023 02:35)        MEDICATIONS  (STANDING):  aspirin enteric coated 81 milliGRAM(s) Oral daily  dextrose 5% + sodium chloride 0.9%. 1000 milliLiter(s) (50 mL/Hr) IV Continuous <Continuous>  dextrose 5%. 1000 milliLiter(s) (100 mL/Hr) IV Continuous <Continuous>  dextrose 5%. 1000 milliLiter(s) (50 mL/Hr) IV Continuous <Continuous>  dextrose 50% Injectable 25 Gram(s) IV Push once  dextrose 50% Injectable 12.5 Gram(s) IV Push once  dextrose 50% Injectable 25 Gram(s) IV Push once  diazepam    Tablet 1 milliGRAM(s) Oral at bedtime  DULoxetine 30 milliGRAM(s) Oral <User Schedule>  furosemide   Injectable 20 milliGRAM(s) IV Push two times a day  glucagon  Injectable 1 milliGRAM(s) IntraMuscular once  heparin   Injectable 5000 Unit(s) SubCutaneous every 12 hours  hydrocortisone 2.5% Cream 1 Application(s) Topical two times a day  insulin glargine Injectable (LANTUS) 12 Unit(s) SubCutaneous at bedtime  insulin lispro (ADMELOG) corrective regimen sliding scale   SubCutaneous three times a day before meals  lactulose Syrup 10 Gram(s) Oral at bedtime  levothyroxine 25 MICROGram(s) Oral <User Schedule>  levothyroxine 50 MICROGram(s) Oral <User Schedule>  metoprolol succinate ER 25 milliGRAM(s) Oral daily  midodrine. 10 milliGRAM(s) Oral three times a day  pantoprazole    Tablet 40 milliGRAM(s) Oral before breakfast  polyethylene glycol 3350 17 Gram(s) Oral two times a day  prasugrel 10 milliGRAM(s) Oral daily  senna 2 Tablet(s) Oral at bedtime    MEDICATIONS  (PRN):  dextrose Oral Gel 15 Gram(s) Oral once PRN Blood Glucose LESS THAN 70 milliGRAM(s)/deciliter

## 2023-06-09 NOTE — PROGRESS NOTE ADULT - PROBLEM SELECTOR PROBLEM 5
S/P AKA (above knee amputation), right
Altered mental status
S/P AKA (above knee amputation), right

## 2023-06-09 NOTE — PROGRESS NOTE ADULT - ASSESSMENT
ASSESSMENT  67-year-old male w/ HFrEF 15-20% s/p ICD, decreased RV function, mild MR/TR, DM type I w/ neuropathy and hx hypoglycemia , DL, HTN, CAD, hx MI, s/p CABG, s/p stent (2018), hx in-stent thrombosis while on Plavix, PAD s/p 3 LLE stents, TIAGO (needs CPAP auto-regulating pressure 10-16, patient doesn't use it due to claustrophobia), Psoriasis, R-AKA presenting to ED s/p fall    IMPRESSION  #s/p Fall  #Shock - septic (UTI) vs cardiogenic  - Blood Cx 6/4 negative  - urine cx 6/5 with ESBL Klebsiella pneumoniae  - MRSA nares positive     #HFrEF s/p ICD   #CAD s/p CABG   #PAD s/p stent   #hx of Right AKA     #Abx allergy: Entresto (Swelling)  Atorvastatin Calcium (Unknown)  Bactrim (Unknown)  Plavix (Unknown)      RECOMMENDATIONS  - Clinical improvement with cefepime as concentrates well in urine -- agree with meropenem to complete course   - continue meropenem 1g q 12 hours until 6/14    This is a preliminary incomplete pended note, all final recommendations to follow after interview and examination of the patient.    Please call or message on [employer] Teams if with any questions.  Spectra [phone]

## 2023-06-09 NOTE — PROGRESS NOTE ADULT - PROBLEM SELECTOR PROBLEM 4
Encounter for palliative care
S/P AKA (above knee amputation), right

## 2023-06-09 NOTE — PROGRESS NOTE ADULT - ASSESSMENT
67M with PMH of HRrEF (15-20% EF) s/p AICD, DM1, HLD, HTN CAD s/p MI s/p CABG, PAD s/p 3 LLE stents, TIAGO, psoariasis, and R AKA here after mechanical fall, without acute trauma noted. Course c/b acute-on-chronic HFrEF and CRS, with type 2 NSTEMI due to demand ischemia, on diuresis. Also with mild transaminitis, likely from congestion. Palliative care consulted for GOC. Patient is full code.    Spoke with patient and wife at bedside. He is comfortable. They are hopeful he continues to improve and want to continue medical management as is for now.     Will follow  Call X [phone] PRN

## 2023-06-09 NOTE — PROGRESS NOTE ADULT - SUBJECTIVE AND OBJECTIVE BOX
FLOWER MARISCAL  67y, Male  Allergy: ACE inhibitors (Rash)  Entresto (Swelling)  Atorvastatin Calcium (Unknown)  Bactrim (Unknown)  Plavix (Unknown)      LOS  22d    CHIEF COMPLAINT: Mechanical fall (08 Jun 2023 15:43)      INTERVAL EVENTS/HPI  - No acute events overnight  - T(F): , Max: 99.1 (06-08-23 @ 08:00)  - off levophed  - WBC Count: 9.23 (06-08-23 @ 04:07)  WBC Count: 11.14 (06-07-23 @ 04:55)     - Creatinine, Serum: 1.4 (06-08-23 @ 04:07)  Creatinine, Serum: 2.0 (06-07-23 @ 16:20)       ROS  General: Denies rigors, nightsweats  HEENT: Denies headache, rhinorrhea, sore throat, eye pain  CV: Denies CP, palpitations  PULM: Denies wheezing, hemoptysis  GI: Denies hematemesis, hematochezia, melena  : Denies discharge, hematuria  MSK: Denies arthralgias, myalgias  SKIN: Denies rash, lesions  NEURO: Denies paresthesias, weakness  PSYCH: Denies depression, anxiety    VITALS:  T(F): 98.2, Max: 99.1 (06-08-23 @ 08:00)  HR: 107  BP: 87/54  RR: 24Vital Signs Last 24 Hrs  T(C): 36.8 (09 Jun 2023 00:00), Max: 37.3 (08 Jun 2023 08:00)  T(F): 98.2 (09 Jun 2023 00:00), Max: 99.1 (08 Jun 2023 08:00)  HR: 107 (09 Jun 2023 06:00) (89 - 114)  BP: 87/54 (09 Jun 2023 06:00) (83/44 - 106/66)  BP(mean): 66 (09 Jun 2023 06:00) (57 - 81)  RR: 24 (09 Jun 2023 06:00) (17 - 31)  SpO2: 96% (09 Jun 2023 06:00) (92% - 100%)    Parameters below as of 09 Jun 2023 06:00  Patient On (Oxygen Delivery Method): room air        PHYSICAL EXAM:  Gen: NAD, resting in bed  HEENT: Normocephalic, atraumatic  Neck: supple, no lymphadenopathy  CV: Regular rate & regular rhythm  Lungs: decreased BS at bases, no fremitus  Abdomen: Soft, BS present  Ext: Warm, well perfused  Neuro: non focal, awake  Skin: no rash, no erythema  Lines: no phlebitis    FH: Non-contributory  Social Hx: Non-contributory    TESTS & MEASUREMENTS:                        10.4   9.23  )-----------( 105      ( 08 Jun 2023 04:07 )             34.7     06-08    144  |  105  |  34<H>  ----------------------------<  257<H>  4.1   |  31  |  1.4    Ca    8.5      08 Jun 2023 04:07  Mg     1.9     06-08    TPro  5.6<L>  /  Alb  2.9<L>  /  TBili  2.4<H>  /  DBili  x   /  AST  28  /  ALT  45<H>  /  AlkPhos  99  06-08      LIVER FUNCTIONS - ( 08 Jun 2023 04:07 )  Alb: 2.9 g/dL / Pro: 5.6 g/dL / ALK PHOS: 99 U/L / ALT: 45 U/L / AST: 28 U/L / GGT: x               Culture - Blood (collected 06-06-23 @ 11:46)  Source: .Blood None  Preliminary Report (06-07-23 @ 22:01):    No growth to date.    Culture - Urine (collected 06-05-23 @ 12:15)  Source: Catheterized Catheterized  Final Report (06-08-23 @ 10:34):    >100,000 CFU/ml Klebsiella pneumoniae ESBL Multiple Morphological Strains  Organism: Klebsiella pneumoniae ESBL  Klebsiella pneumoniae ESBL (06-08-23 @ 10:34)  Organism: Klebsiella pneumoniae ESBL (06-08-23 @ 10:34)      Method Type: YOLIE      -  Amikacin: S <=16      -  Amoxicillin/Clavulanic Acid: S <=8/4 Consider reserving for cystitis when ampicillin/sulbactam is resistant      -  Ampicillin: R >16 These ampicillin results predict results for amoxicillin      -  Ampicillin/Sulbactam: R >16/8 Enterobacter, Klebsiella aerogenes, Citrobacter, and Serratia may develop resistance during prolonged therapy (3-4 days)      -  Aztreonam: R >16      -  Cefazolin: R >16 For uncomplicated UTI with K. pneumoniae, E. coli, or P. mirablis: YOLIE <=16 is sensitive and YOLIE >=32 is resistant. This also predicts results for oral agents cefaclor, cefdinir, cefpodoxime, cefprozil, cefuroxime axetil, cephalexin and locarbef for uncomplicated UTI. Note that some isolates may be susceptible to these agents while testing resistant to cefazolin.      -  Cefepime: R >16      -  Ceftriaxone: R >32 Enterobacter, Klebsiella aerogenes, Citrobacter, and Serratia may develop resistance during prolonged therapy      -  Cefuroxime: R >16      -  Ciprofloxacin: R >2      -  Ertapenem: S <=0.5      -  Gentamicin: R >8      -  Imipenem: S <=1      -  Levofloxacin: R 4      -  Meropenem: S <=1      -  Nitrofurantoin: S <=32 Should not be used to treat pyelonephritis      -  Piperacillin/Tazobactam: S <=8      -  Tobramycin: I 8      -  Trimethoprim/Sulfamethoxazole: R >2/38  Organism: Klebsiella pneumoniae ESBL (06-08-23 @ 10:34)      Method Type: YOLIE      -  Amikacin: S <=16      -  Amoxicillin/Clavulanic Acid: S <=8/4 Consider reserving for cystitis when ampicillin/sulbactam is resistant      -  Ampicillin: R >16 These ampicillin results predict results for amoxicillin      -  Ampicillin/Sulbactam: R >16/8 Enterobacter, Klebsiella aerogenes, Citrobacter, and Serratia may develop resistance during prolonged therapy (3-4 days)      -  Aztreonam: R >16      -  Cefazolin: R >16 For uncomplicated UTI with K. pneumoniae, E. coli, or P. mirablis: YOLIE <=16 is sensitive and YOLIE >=32 is resistant. This also predicts results for oral agents cefaclor, cefdinir, cefpodoxime, cefprozil, cefuroxime axetil, cephalexin and locarbef for uncomplicated UTI. Note that some isolates may be susceptible to these agents while testing resistant to cefazolin.      -  Cefepime: R 16      -  Ceftriaxone: R >32 Enterobacter, Klebsiella aerogenes, Citrobacter, and Serratia may develop resistance during prolonged therapy      -  Cefuroxime: R >16      -  Ciprofloxacin: R >2      -  Ertapenem: S <=0.5      -  Gentamicin: R >8      -  Imipenem: S <=1      -  Levofloxacin: R 2      -  Meropenem: S <=1      -  Nitrofurantoin: S <=32 Should not be used to treat pyelonephritis      -  Piperacillin/Tazobactam: S <=8      -  Tobramycin: S 4      -  Trimethoprim/Sulfamethoxazole: R >2/38    Culture - Blood (collected 06-04-23 @ 11:50)  Source: .Blood Blood  Preliminary Report (06-05-23 @ 16:02):    No growth to date.        Lactate, Blood: 1.3 mmol/L (06-08-23 @ 04:07)  Lactate, Blood: 1.2 mmol/L (06-07-23 @ 04:55)  Lactate, Blood: 1.6 mmol/L (06-06-23 @ 04:43)  Lactate, Blood: 2.0 mmol/L (06-05-23 @ 17:41)  Lactate, Blood: 4.6 mmol/L (06-05-23 @ 09:40)  Blood Gas Venous - Lactate: 4.60 mmol/L (06-05-23 @ 09:40)  Lactate, Blood: 2.9 mmol/L (06-04-23 @ 18:31)      INFECTIOUS DISEASES TESTING  MRSA PCR Result.: Positive (06-06-23 @ 04:42)  Procalcitonin, Serum: 0.56 (06-04-23 @ 11:50)  Procalcitonin, Serum: 0.09 (02-23-23 @ 17:04)  HIV-1/2 Combo Result: Nonreact (01-11-23 @ 11:27)  COVID-19 PCR: NotDetec (01-10-23 @ 18:16)  COVID-19 PCR: NotDetec (06-27-22 @ 09:52)  COVID-19 PCR: Negative (06-15-22 @ 18:54)      INFLAMMATORY MARKERS  Sedimentation Rate, Erythrocyte: 15 mm/Hr (02-23-23 @ 17:04)  C-Reactive Protein, Serum: 32.1 mg/L (02-23-23 @ 17:04)  Sedimentation Rate, Erythrocyte: 3 mm/Hr (01-11-23 @ 16:22)  C-Reactive Protein, Serum: 55.5 mg/L (01-11-23 @ 16:22)      RADIOLOGY & ADDITIONAL TESTS:  I have personally reviewed the last available Chest xray  CXR  Xray Chest 1 View- PORTABLE-Urgent:   ACC: [id number] EXAM:  XR CHEST PORTABLE URGENT 1V   ORDERED BY: JAI BEHGAL     PROCEDURE DATE:  06/07/2023          INTERPRETATION:  CLINICAL HISTORY: intubated.    COMPARISON: 6/6/2023.    TECHNIQUE: Portable frontal chest radiograph. Adequate positioning.    FINDINGS:    Support devices: Right IJ [place]-Ruchi catheter, endotracheal tube and   enteric tube are in satisfactory position. Stable left ICD.    Cardiac/mediastinum/hilum: Stable.    Lung parenchyma/Pleura: No focal parenchymal opacities,pleural   effusions, or pneumothorax.    Skeleton/soft tissues: Stable.      IMPRESSION:  Support devices in satisfactory position.  No radiographic evidence of acute cardiopulmonary disease.    --- End of Report ---            NESTOR GIL MD; Attending Radiologist  This document has been electronically signed. Jun 7 2023 12:53PM (06-07-23 @ 10:06)      CT      CARDIOLOGY TESTING  12 Lead ECG:   Ventricular Rate 107 BPM    Atrial Rate 107 BPM    P-R Interval 126 ms    QRS Duration 164 ms    Q-T Interval 408 ms    QTC Calculation(Bazett) 544 ms    P Axis 66 degrees    R Axis -47 degrees    T Axis 30 degrees    Diagnosis Line Atrial-sensed ventricular-paced rhythm with occasional Premature ventricular  complexes  Abnormal ECG    Confirmed by SOUTH LACKEY MD ([id number]) on 6/9/2023 6:54:03 AM (06-09-23 @ 05:52)  12 Lead ECG:   Ventricular Rate 111 BPM    Atrial Rate 111 BPM    P-R Interval 126 ms    QRS Duration 166 ms    Q-T Interval 382 ms    QTC Calculation(Bazett) 519 ms    P Axis 63 degrees    R Axis -36 degrees    T Axis 83 degrees    Diagnosis Line Atrial-sensed ventricular-paced rhythm with Premature atrial complexes with   Aberrant conduction  Abnormal ECG    Confirmed by London Santos ([id number]) on 6/8/2023 8:51:49 AM (06-08-23 @ 06:12)      MEDICATIONS  aspirin enteric coated 81 Oral daily  chlorhexidine 2% Cloths 1 Topical daily  dextrose 5%. 1000 IV Continuous <Continuous>  dextrose 5%. 1000 IV Continuous <Continuous>  dextrose 5%. 1000 IV Continuous <Continuous>  dextrose 50% Injectable 25 IV Push once  dextrose 50% Injectable 25 IV Push once  dextrose 50% Injectable 12.5 IV Push once  DULoxetine 30 Oral <User Schedule>  folic acid 1 Oral <User Schedule>  glucagon  Injectable 1 IntraMuscular once  heparin   Injectable 5000 SubCutaneous every 12 hours  hydrocortisone 2.5% Cream 1 Topical two times a day  insulin glargine Injectable (LANTUS) 12 SubCutaneous every morning  insulin lispro (ADMELOG) corrective regimen sliding scale  SubCutaneous three times a day before meals  levothyroxine 25 Oral <User Schedule>  levothyroxine 50 Oral <User Schedule>  mupirocin 2% Ointment 1 Both Nostrils two times a day  pantoprazole  Injectable 40 IV Push daily  polyethylene glycol 3350 17 Oral two times a day  prasugrel 10 Oral daily  senna 2 Oral at bedtime      WEIGHT  Weight (kg): 65 (06-04-23 @ 13:26)      ANTIBIOTICS:      All available historical records have been reviewed

## 2023-06-09 NOTE — PROGRESS NOTE ADULT - ASSESSMENT
Assessment: 67-year-old male w/ HFrEF 15-20% s/p ICD, DM type I, DL, HTN, CAD, hx MI, s/p CABG, s/p stent (2018), hx in-stent thrombosis while on Plavix, PAD s/p 3 LLE stents, TIAGO non-compliant w/ CPAP, R-AKA presenting to ED s/p fall (mechanical). Patient found to be fluid overloaded with MOISES, heart failure consulted for further management of ADHF. Hospital course c/b AMS, elevated lactate with worsening renal/liver function. Patient was intubated 6/05 for airway protection, started empirically on inotropic support with good urine response + improving lactate. Patient was transferred to CCU for SWAN catheter placement for closer hemodynamic monitoring. Of note, patient also found to have +UA w/puss noted in fields bag. UCx positive for klebsiella pneumoniae ESBL- treating with IV antibiotics. Patient extubated 6/08, Prince catheter removed. Still awaiting BCx final results.       Plan:  POCUS exam: IVC dilated, non collapsing with respirations  Monitor lactate  Strict I/Os   Continue Abx for septic shock   SLP eval appreciated  PT evaluation / OOB to chair   Get BMP daily with magnesium   Maintain potassium >4.0, Mg >2.2  Strict intake and output  Daily weight   GDMT once BP more stable    Rest of care per primary team   Plan discussed with CCU team  Will continue to monitor

## 2023-06-09 NOTE — PROGRESS NOTE ADULT - SUBJECTIVE AND OBJECTIVE BOX
HPI:  67-year-old male w/ HFrEF 15-20% s/p ICD, decreased RV function, mild MR/TR, DM type I w/ neuropathy and hx hypoglycemia , DL, HTN, CAD, hx MI, s/p CABG, s/p stent (2018), hx in-stent thrombosis while on Plavix, PAD s/p 3 LLE stents, TIAGO (needs CPAP auto-regulating pressure 10-16, patient doesn't use it due to claustrophobia), Psoriasis, R-AKA presenting to ED s/p fall. He states he was lying in bed at home when he fell asleep and fell off the bed, and complains of generalized headache neck pain, non-radiating, no alleviating or aggravating factors, associated with right anterior chest wall pain. Denies LOC,  fevers, chills, nausea, vomiting, dizziness, abdominal pain, shortness of breath. States he had TDAP recently. Pt was not down for long as wife was upstairs when he fell off the bed and called EMS right away.     ED  Vital Signs  T(F):Max: 97.9   HR: 76 - 110  BP: 100/61 - 102/62  RR: 18   SpO2: 94% - 96% on room air     Labs significant for cr 1.8 (baseline ~ 1.2)  trop 0.10             (18 May 2023 14:16)    Currently admitted to medicine with the primary diagnosis of Acute HF (heart failure)       Today is hospital day 22d.     INTERVAL HPI / OVERNIGHT EVENTS:  Patient was examined and seen at bedside. This morning he is resting comfortably in bed and reports no new issues or overnight events. Vitals stable, patient tolerating oral diet, voiding appropriately, having regular BMs. Will continue to monitor.     ROS: Otherwise unremarkable     PAST MEDICAL & SURGICAL HISTORY  Status post percutaneous transluminal coronary angioplasty  2 stents    Atherosclerosis of coronary artery  CAD (coronary artery disease)    Osteoarthritis    HLD (hyperlipidemia)    Diabetes  on insulin pump    CHF (congestive heart failure)  EF ~ 25%    History of celiac disease    Diverticulitis    STEMI (ST elevation myocardial infarction)    Diabetic neuropathy  Hands and Feet    Anxiety and depression    Other postprocedural status  Fixation hardware in foot LEFT    Stented coronary artery  10/18 heart attack  INFERIOR WALL MI    S/P CABG x 1  2018    S/P TKR (total knee replacement), right  with infection Mrsa   per pt he was cleared from MRSA infection    Surgery, elective  right knee wound debridement    History of open reduction and internal fixation (ORIF) procedure  right hip    H/O shoulder surgery  right    AICD (automatic cardioverter/defibrillator) present  St Kali    Cholecystostomy care  drain inserted 2020 & removed 4 months ago    History of tonsillectomy    History of hip replacement, total, right    Elective surgery  plastic surgery Left shin      ALLERGIES  ACE inhibitors (Rash)  Entresto (Swelling)  Atorvastatin Calcium (Unknown)  Bactrim (Unknown)  Plavix (Unknown)    MEDICATIONS  STANDING MEDICATIONS  aspirin enteric coated 81 milliGRAM(s) Oral daily  chlorhexidine 2% Cloths 1 Application(s) Topical daily  dextrose 5%. 1000 milliLiter(s) IV Continuous <Continuous>  dextrose 5%. 1000 milliLiter(s) IV Continuous <Continuous>  dextrose 50% Injectable 25 Gram(s) IV Push once  dextrose 50% Injectable 12.5 Gram(s) IV Push once  dextrose 50% Injectable 25 Gram(s) IV Push once  DULoxetine 30 milliGRAM(s) Oral <User Schedule>  folic acid 1 milliGRAM(s) Oral <User Schedule>  glucagon  Injectable 1 milliGRAM(s) IntraMuscular once  heparin   Injectable 5000 Unit(s) SubCutaneous every 12 hours  hydrocortisone 2.5% Cream 1 Application(s) Topical two times a day  insulin glargine Injectable (LANTUS) 12 Unit(s) SubCutaneous every morning  insulin lispro (ADMELOG) corrective regimen sliding scale   SubCutaneous three times a day before meals  levothyroxine 25 MICROGram(s) Oral <User Schedule>  levothyroxine 50 MICROGram(s) Oral <User Schedule>  mupirocin 2% Ointment 1 Application(s) Both Nostrils two times a day  pantoprazole  Injectable 40 milliGRAM(s) IV Push daily  polyethylene glycol 3350 17 Gram(s) Oral two times a day  prasugrel 10 milliGRAM(s) Oral daily  senna 2 Tablet(s) Oral at bedtime    PRN MEDICATIONS  acetaminophen     Tablet .. 325 milliGRAM(s) Oral every 4 hours PRN  dextrose Oral Gel 15 Gram(s) Oral once PRN    VITALS:  T(F): 96.9  HR: 105  BP: 88/51  RR: 23  SpO2: 95%    PHYSICAL EXAM  GEN: NAD, Resting comfortably in bed, cooperative  PULM: Clear to auscultation bilaterally, No wheezing, rales, rhonchi  CVS: Regular rate and rhythm, S1-S2, no murmurs, heaves, thrills  ABD: Soft, non-tender, non-distended, no guarding, BS +  EXT: No edema/cyanosis, extremities warm/dry, peripheral pulses palpable   NEURO: A&Ox3, no focal deficits    LABS                        9.7    6.43  )-----------( x        ( 09 Jun 2023 08:06 )             33.7     06-08    144  |  105  |  34<H>  ----------------------------<  257<H>  4.1   |  31  |  1.4    Ca    8.5      08 Jun 2023 04:07  Mg     1.9     06-08    TPro  5.6<L>  /  Alb  2.9<L>  /  TBili  2.4<H>  /  DBili  x   /  AST  28  /  ALT  45<H>  /  AlkPhos  99  06-08        ABG - ( 08 Jun 2023 10:46 )  pH, Arterial: 7.48  pH, Blood: x     /  pCO2: 48    /  pO2: 168   / HCO3: 36    / Base Excess: 10.9  /  SaO2: 99.2                  Culture - Blood (collected 06 Jun 2023 11:46)  Source: .Blood None  Preliminary Report (07 Jun 2023 22:01):    No growth to date.            RADIOLOGY     HPI:  67-year-old male w/ HFrEF 15-20% s/p ICD, decreased RV function, mild MR/TR, DM type I w/ neuropathy and hx hypoglycemia , DL, HTN, CAD, hx MI, s/p CABG, s/p stent (2018), hx in-stent thrombosis while on Plavix, PAD s/p 3 LLE stents, TIAGO (needs CPAP auto-regulating pressure 10-16, patient doesn't use it due to claustrophobia), Psoriasis, R-AKA presenting to ED s/p fall. He states he was lying in bed at home when he fell asleep and fell off the bed, and complains of generalized headache neck pain, non-radiating, no alleviating or aggravating factors, associated with right anterior chest wall pain. Denies LOC,  fevers, chills, nausea, vomiting, dizziness, abdominal pain, shortness of breath. States he had TDAP recently. Pt was not down for long as wife was upstairs when he fell off the bed and called EMS right away.     ED  Vital Signs  T(F):Max: 97.9   HR: 76 - 110  BP: 100/61 - 102/62  RR: 18   SpO2: 94% - 96% on room air     Labs significant for cr 1.8 (baseline ~ 1.2)  trop 0.10             (18 May 2023 14:16)    Currently admitted to medicine with the primary diagnosis of Acute HF (heart failure)       Today is hospital day 22d.     INTERVAL HPI / OVERNIGHT EVENTS:  Patient was examined and seen at bedside. This morning he is resting comfortably in bed and reports no new issues or overnight events. Hemodynamically stable, NPO failed SLP eval, refusing NG tube, voiding appropriately, having BMs.     ROS: Otherwise unremarkable     PAST MEDICAL & SURGICAL HISTORY  Status post percutaneous transluminal coronary angioplasty  2 stents    Atherosclerosis of coronary artery  CAD (coronary artery disease)    Osteoarthritis    HLD (hyperlipidemia)    Diabetes  on insulin pump    CHF (congestive heart failure)  EF ~ 25%    History of celiac disease    Diverticulitis    STEMI (ST elevation myocardial infarction)    Diabetic neuropathy  Hands and Feet    Anxiety and depression    Other postprocedural status  Fixation hardware in foot LEFT    Stented coronary artery  10/18 heart attack  INFERIOR WALL MI    S/P CABG x 1  2018    S/P TKR (total knee replacement), right  with infection Mrsa   per pt he was cleared from MRSA infection    Surgery, elective  right knee wound debridement    History of open reduction and internal fixation (ORIF) procedure  right hip    H/O shoulder surgery  right    AICD (automatic cardioverter/defibrillator) present  St Kali    Cholecystostomy care  drain inserted 2020 & removed 4 months ago    History of tonsillectomy    History of hip replacement, total, right    Elective surgery  plastic surgery Left shin      ALLERGIES  ACE inhibitors (Rash)  Entresto (Swelling)  Atorvastatin Calcium (Unknown)  Bactrim (Unknown)  Plavix (Unknown)    MEDICATIONS  STANDING MEDICATIONS  aspirin enteric coated 81 milliGRAM(s) Oral daily  chlorhexidine 2% Cloths 1 Application(s) Topical daily  dextrose 5%. 1000 milliLiter(s) IV Continuous <Continuous>  dextrose 5%. 1000 milliLiter(s) IV Continuous <Continuous>  dextrose 50% Injectable 25 Gram(s) IV Push once  dextrose 50% Injectable 12.5 Gram(s) IV Push once  dextrose 50% Injectable 25 Gram(s) IV Push once  DULoxetine 30 milliGRAM(s) Oral <User Schedule>  folic acid 1 milliGRAM(s) Oral <User Schedule>  glucagon  Injectable 1 milliGRAM(s) IntraMuscular once  heparin   Injectable 5000 Unit(s) SubCutaneous every 12 hours  hydrocortisone 2.5% Cream 1 Application(s) Topical two times a day  insulin glargine Injectable (LANTUS) 12 Unit(s) SubCutaneous every morning  insulin lispro (ADMELOG) corrective regimen sliding scale   SubCutaneous three times a day before meals  levothyroxine 25 MICROGram(s) Oral <User Schedule>  levothyroxine 50 MICROGram(s) Oral <User Schedule>  mupirocin 2% Ointment 1 Application(s) Both Nostrils two times a day  pantoprazole  Injectable 40 milliGRAM(s) IV Push daily  polyethylene glycol 3350 17 Gram(s) Oral two times a day  prasugrel 10 milliGRAM(s) Oral daily  senna 2 Tablet(s) Oral at bedtime    PRN MEDICATIONS  acetaminophen     Tablet .. 325 milliGRAM(s) Oral every 4 hours PRN  dextrose Oral Gel 15 Gram(s) Oral once PRN    VITALS:  T(F): 96.9  HR: 105  BP: 88/51  RR: 23  SpO2: 95%    PHYSICAL EXAM  GEN: NAD, Resting comfortably in bed, cooperative  PULM: Clear to auscultation bilaterally, No wheezing, rales, rhonchi  CVS: Regular rate and rhythm, S1-S2, no murmurs, heaves, thrills  ABD: Soft, non-tender, non-distended, no guarding, BS +  EXT: No edema/cyanosis, extremities warm/dry, peripheral pulses palpable   NEURO: A&Ox3, no focal deficits    LABS                        9.7    6.43  )-----------( x        ( 09 Jun 2023 08:06 )             33.7     06-08    144  |  105  |  34<H>  ----------------------------<  257<H>  4.1   |  31  |  1.4    Ca    8.5      08 Jun 2023 04:07  Mg     1.9     06-08    TPro  5.6<L>  /  Alb  2.9<L>  /  TBili  2.4<H>  /  DBili  x   /  AST  28  /  ALT  45<H>  /  AlkPhos  99  06-08        ABG - ( 08 Jun 2023 10:46 )  pH, Arterial: 7.48  pH, Blood: x     /  pCO2: 48    /  pO2: 168   / HCO3: 36    / Base Excess: 10.9  /  SaO2: 99.2                  Culture - Blood (collected 06 Jun 2023 11:46)  Source: .Blood None  Preliminary Report (07 Jun 2023 22:01):    No growth to date.            RADIOLOGY

## 2023-06-09 NOTE — PROGRESS NOTE ADULT - PROBLEM SELECTOR PROBLEM 2
Cardiorenal syndrome
Disoriented
Cardiorenal syndrome

## 2023-06-09 NOTE — PROGRESS NOTE ADULT - ASSESSMENT
CNS:   - off sedation  - CTH, EEG -ve    HEENT:  Oral care    PULMONARY  - s/p extubation 6/8 AM - post ABG on BiPAP 7.48/48/168/36  - HOB @ 45 degrees, keep Sao2 92 to 96%  - Aspiration precautions   - wean BiPAP to NC as tolerated     CARDIOVASCULAR:   - TTE 5/24: EF 20%, global cardiomyopathy, mod MR, mod-sev TR  - trop 0.23>0.16  - off inotropes/pressure support   - repeat hemodynamics   - holding diuretics   - strict I/Os   - A-line removed 6/8  - Valmeyer removed 6/8  - R IJ cordis in place     GI  - NPO pending SLP eval   - LFTs improving, ALP 99, AST/ALT 28/45  - trend LFTS  - c/w senna 2 tab qhs, miralax bid, lactulose d/c   - c/w GI ppx 40mg qD    RENAL  - Na 144 d/c IVF and free water flush   - Cr 1.1>3.5>2.5>1.4  reported bsl CKD III  - UA +ve protein, nitrite, LE, blood, wbc, bacteria  - UCX +ve ESBL klebsiella sensitive to Meropenem  - KBUS -ve hydronephrosis   - Net I/O +75  - trend BMP qD, correct electrolytes as needed  - strict I/Os c/w donnie     INFECTIOUS DISEASE  - off vancomymin  - MRSA nares +ve - mupirocin BID x5d 6/7-6/11  - UA positive, Bcx -ve 6/6, UCx +ve ESBL Klebsiella 6/5  - d/c cefepime, started meropenem 2g IV q12hr   - ID consult placed     HEMATOLOGICAL  - c/w heparin 5k BID  - PAD LLE - arterial duplex adequate flow,-ve DVT  - c/w prasugrel 10mg po qD, ASA 81mg qD  - hgb stable ~10, mcv 75, serum iron low, %sat low, ferritin wnl s/p venofer x5d  - >120>105 continue to monitor  - c/w folate 1mg S/M/T/Th/F/Sa    ENDOCRINE:   - FS goal 140-180, c/w ISS, restart insulin once on po feeding   - c/w levothyroxine 25mcg M/T/W/Th/F/Sa, 50mcg Dueñas  - c/w MTX 10mg qW    # To follow up  - f/u ID consult   - repeat hemodynamics off pressure support  - trend LFTs/PLT, Cr, monitor UOP   - SLP for dysphagia screen    # Misc  - DVT Prophylaxis: heparin   Injectable  - GI Prophylaxis: pantoprazole    Tablet 40 milliGRAM(s) Oral before breakfast  - Diet: Diet, NPO  - Activity: Activity - IAT  - Code Status: Full

## 2023-06-09 NOTE — PROGRESS NOTE ADULT - PROBLEM SELECTOR PLAN 2
Monitor GFR  - nephrology following closely.
Monitor GFR  - nephrology following closely
kidney function improved   - HF team and renal following  - high risk
Monitor GFR  - nephrology following closely.
Monitor GFR  - nephrology following closely.
Monitor GFR  - nephrology following.
Monitor GFR  - nephrology following.
Monitor GFR  - nephrology following
Now in cardiogenic shock  - Upgraded to CCU   - HF team and renal following  - high risk

## 2023-06-09 NOTE — PROGRESS NOTE ADULT - SUBJECTIVE AND OBJECTIVE BOX
HPI:    67-year-old male w/ HFrEF 15-20% s/p ICD, decreased RV function, mild MR/TR, DM type I w/ neuropathy and hx hypoglycemia , DL, HTN, CAD, hx MI, s/p CABG, s/p stent (2018), hx in-stent thrombosis while on Plavix, PAD s/p 3 LLE stents, TIAGO (needs CPAP auto-regulating pressure 10-16, patient doesn't use it due to claustrophobia), Psoriasis, R-AKA presenting to ED s/p fall. He states he was lying in bed at home when he fell asleep and fell off the bed, and complains of generalized headache neck pain, non-radiating, no alleviating or aggravating factors, associated with right anterior chest wall pain. Denies LOC,  fevers, chills, nausea, vomiting, dizziness, abdominal pain, shortness of breath. States he had TDAP recently. Pt was not down for long as wife was upstairs when he fell off the bed and called EMS right away.     Interval History  - Pt s/p extubation, no complaints of pain or dyspnea. wife and her sister are at bedside. he failed S & S , pending swallow study.     ADVANCE DIRECTIVES:    [ x] Full Code [ ] DNR  MOLST  [ ]  Living Will  [ ]   DECISION MAKER(s):  [x ] Health Care Proxy(s)  [X ] Surrogate(s)  [ ] Guardian           Name(s): Phone Number(s): Raine - Wife       Answers: Emergency Contact Name Raine,  Answers: Emergency Contact Phone # [phone],  Answers: Emergency Contact Relationship wife    BASELINE (I)ADL(s) (prior to admission):  [place]: [ ]Total  [ ] Moderate [x ]Dependent      Allergies    ACE inhibitors (Rash)  Entresto (Swelling)  Atorvastatin Calcium (Unknown)  Bactrim (Unknown)  Plavix (Unknown)    Intolerances    MEDICATIONS  (STANDING):  aspirin enteric coated 81 milliGRAM(s) Oral daily  buMETAnide Injectable 2 milliGRAM(s) IV Push two times a day  cefepime   IVPB 2000 milliGRAM(s) IV Intermittent every 24 hours  chlorhexidine 2% Cloths 1 Application(s) Topical daily  dexMEDEtomidine Infusion 0.2 MICROgram(s)/kG/Hr (3.25 mL/Hr) IV Continuous <Continuous>  dextrose 5%. 1000 milliLiter(s) (50 mL/Hr) IV Continuous <Continuous>  dextrose 5%. 1000 milliLiter(s) (100 mL/Hr) IV Continuous <Continuous>  dextrose 50% Injectable 25 Gram(s) IV Push once  dextrose 50% Injectable 12.5 Gram(s) IV Push once  dextrose 50% Injectable 25 Gram(s) IV Push once  dextrose 50% Injectable 25 milliLiter(s) IV Push once  DULoxetine 30 milliGRAM(s) Oral <User Schedule>  fentaNYL   Infusion. 0.5 MICROgram(s)/kG/Hr (3.25 mL/Hr) IV Continuous <Continuous>  folic acid 1 milliGRAM(s) Oral <User Schedule>  glucagon  Injectable 1 milliGRAM(s) IntraMuscular once  heparin   Injectable 5000 Unit(s) SubCutaneous every 12 hours  hydrocortisone 2.5% Cream 1 Application(s) Topical two times a day  insulin lispro (ADMELOG) corrective regimen sliding scale   SubCutaneous three times a day before meals  lactulose Syrup 10 Gram(s) Oral at bedtime  levothyroxine 25 MICROGram(s) Oral <User Schedule>  levothyroxine 50 MICROGram(s) Oral <User Schedule>  magnesium sulfate  IVPB 2 Gram(s) IV Intermittent once  magnesium sulfate  IVPB 2 Gram(s) IV Intermittent once  methotrexate (Non - oncologic) 10 milliGRAM(s) Oral every week  metoprolol succinate ER 25 milliGRAM(s) Oral daily  midodrine. 10 milliGRAM(s) Oral three times a day  milrinone Infusion 0.125 MICROgram(s)/kG/Min (2.44 mL/Hr) IV Continuous <Continuous>  norepinephrine Infusion 0.05 MICROgram(s)/kG/Min (6.09 mL/Hr) IV Continuous <Continuous>  pantoprazole    Tablet 40 milliGRAM(s) Oral before breakfast  polyethylene glycol 3350 17 Gram(s) Oral two times a day  prasugrel 10 milliGRAM(s) Oral daily  senna 2 Tablet(s) Oral at bedtime  vancomycin  IVPB 500 milliGRAM(s) IV Intermittent every 8 hours    MEDICATIONS  (PRN):  acetaminophen     Tablet .. 325 milliGRAM(s) Oral every 4 hours PRN Moderate Pain (4 - 6)  dextrose Oral Gel 15 Gram(s) Oral once PRN Blood Glucose LESS THAN 70 milliGRAM(s)/deciliter  oxyCODONE    IR 5 milliGRAM(s) Oral every 6 hours PRN Pain 4-10    PRESENT SYMPTOMS:  Pain: [ ]yes [ X]no  QOL impact -   Location -                    Aggravating factors -  Quality -  Radiation -  Timing-  Severity (0-10 scale):  Minimal acceptable level (0-10 scale):     CPOT:    https://[email or web address]/Critical-Care-Pain-Observation-Tool-(CPOT)    PAIN AD Score:   [email or web address]/painad.pdf (press ctrl +  left click to view)    Dyspnea:                           [ X]None[ ]Mild [ ]Moderate [ ]Severe     Respiratory Distress Observation Scale (RDOS):   A score of 0 to 2 signifies little or no respiratory distress, 3 signifies mild distress, scores 4 to 6 indicate moderate distress, and scores greater than 7 signify severe distress  [email or web address]-[email or web address]    Anxiety:                             [ ]None[ ]Mild [ ]Moderate [ ]Severe   Fatigue:                             [ ]None[ X]Mild [ ]Moderate [ ]Severe   Nausea:                             [ ]None[ ]Mild [ ]Moderate [ ]Severe   Loss of appetite:              [ ]None[ ]Mild [ ]Moderate [ ]Severe   Constipation:                    [ ]None[ ]Mild [ ]Moderate [ ]Severe    Other Symptoms:  [X ]All other review of systems negative     Palliative Performance Status Version 2:        30 %    [email or web address]/[email or web address]_performance_[email or web address]_[email or web address]    PHYSICAL EXAM:  ICU Vital Signs Last 24 Hrs  T(C): 36.1 (09 Jun 2023 12:00), Max: 36.8 (09 Jun 2023 00:00)  T(F): 97 (09 Jun 2023 12:00), Max: 98.2 (09 Jun 2023 00:00)  HR: 105 (09 Jun 2023 14:00) (104 - 114)  BP: 81/46 (09 Jun 2023 14:00) (81/46 - 104/58)  BP(mean): 61 (09 Jun 2023 14:00) (57 - 78)  RR: 25 (09 Jun 2023 14:00) (17 - 36)  SpO2: 92% (09 Jun 2023 14:00) (92% - 100%)    GENERAL:  [ X] No acute distress [ ]Lethargic  [ ]Unarousable  [X ]Verbal  [ ]Non-Verbal [ ]Cachexia    BEHAVIORAL/PSYCH:  [x ]Alert and Oriented x  1[ ] Anxiety [ ] Delirium [x ] Agitation [X ] Calm   EYES: [x ] No scleral icterus [ ] Scleral icterus [ ] Closed  ENMT:  [ ]Dry mouth  [ ]No external oral lesions [ ] No external ear or nose lesions  CARDIOVASCULAR:  [ ]Regular [ ]Irregular [ ]Tachy [ ]Not Tachy  [ ][place] [ ] Edema [X ] No edema  PULMONARY:  [ ]Tachypnea  [ ]Audible excessive secretions [ x] No labored breathing [ ] labored breathing  GASTROINTESTINAL: [ x]Soft  [ ]Distended  [ ]Not distended [ ]Non tender [ ]Tender  MUSCULOSKELETAL: [ ]No clubbing [ ] clubbing  [ ] No cyanosis [ ] cyanosis  NEUROLOGIC: [ ]No focal deficits  [X ]Follows commands  [ ]Does not follow commands  [x ]Cognitive impairment  [ ]Dysphagia  [ ]Dysarthria  [ ]Paresis   SKIN: [ ] Jaundiced [x ] Non-jaundiced [ ]Rash [ ]No Rash [ ] Warm [ x] Multiple small sores   MISC/LINES: [ ] ET tube [ ] Trach [ ]NGT/OGT [ ]PEG [ x]Loja      LABS: reviewed by me      06-09    150<H>  |  108  |  26<H>  ----------------------------<  208<H>  3.9   |  33<H>  |  1.1    Ca    8.4      09 Jun 2023 08:06  Mg     2.0     06-09    TPro  5.5<L>  /  Alb  2.9<L>  /  TBili  2.4<H>  /  DBili  x   /  AST  23  /  ALT  34  /  AlkPhos  93  06-09                            9.7    6.43  )-----------( 101      ( 09 Jun 2023 08:06 )             33.7           RADIOLOGY & ADDITIONAL STUDIES: reviewed by me    EKG: reviewed by me    REFERRALS:   [ ]Chaplaincy  [ ]Hospice  [ ]Child Life  [ x]Social Work  [ x]Case management [ ]Holistic Therapy     Patient discussed with primary medical team MD  Palliative care education provided to patient and/or family

## 2023-06-09 NOTE — PROGRESS NOTE ADULT - PROBLEM SELECTOR PLAN 1
s/p SWAN catheter to  obtain hemodynamics   Patient currently extubated and on room air  Off pressors  continue recommendations as per cardiology

## 2023-06-09 NOTE — PROGRESS NOTE ADULT - PROBLEM SELECTOR PROBLEM 1
Acute on chronic HFrEF (heart failure with reduced ejection fraction)

## 2023-06-09 NOTE — PROGRESS NOTE ADULT - PROBLEM SELECTOR PLAN 6
DULoxetine 30 milliGRAM(s) Oral user schedule  Valium 1mg PO QHS for anxiety/insomnia  Supportive care
Full code  - Will follow
DULoxetine 30 milliGRAM(s) Oral (and for pain?)   Valium 1mg PO QHS   - has been chronically on Valium as per ISTOP. was on 2mg daily.
DULoxetine 30 milliGRAM(s) Oral (and for pain?)   Valium 1mg PO QHS   - has been chronically on Valium as per ISTOP. was on 2mg daily.
: DULoxetine 30 milliGRAM(s) Oral (and for pain?)   Valium 1mg PO QHS   - has been chronically on Valium as per ISTOP. was on 2mg daily  - May need to be d/c? d/w attending.
DULoxetine 30 milliGRAM(s) Oral (and for pain?)   Valium 1mg PO QHS   - has been chronically on Valium as per ISTOP. was on 2mg daily  - May need to be d/c? d/w attending
DULoxetine 30 milliGRAM(s) Oral (and for pain?)   Valium 1mg PO QHS   - has been chronically on Valium as per ISTOP. was on 2mg daily
Valium 1mg PO QHS  Seroquel 25mg PO daily   Pt has some AMS was chronically on Valium up to 2mg daily   Supportive care
Worsening mental status over the weekend, now sedated on Fentanyl and Precedex  Ongoing critical care management  Re-assess when sedation is lowered

## 2023-06-10 NOTE — SWALLOW BEDSIDE ASSESSMENT ADULT - COMMENTS
Pt known to SLP dept from previous admission in January, recs for easy to chew, thin liquids. suspected pharyngeal dysphagia;  minimal overt symptoms of pharyngeal dysfunction with puree and small cup sips of mildly thick liquids

## 2023-06-10 NOTE — PROGRESS NOTE ADULT - SUBJECTIVE AND OBJECTIVE BOX
LOIDAFLOWER  67y  Male  ***My note supersedes ALL resident notes that I sign.  My corrections for their notes are in my note.***    I can be reached directly on [id number]. My office number is [phone]. My personal cell number is [phone].    Hospital Day 23 - my first day w/ pt    INTERVAL EVENTS: Here for f/u of CHF. Pt says he knows me, but this is not true. Pt was getting defensive and aggravated when I was asking him orientation questions that he could not answer. He insisted that he knew the answer to each question, but did not need to tell me. Family says that at baseline he is rather nl, perhaps very slightly forgetful, but certainly not at all demented. Pt seems delirious from long stay and recent extubation. Pt says he feels OK. Pt seems to be swallowing applesauce fine. RN says pt can take oral meds.    T(F): 97.6 (06-10-23 @ 12:22), Max: 97.6 (06-10-23 @ 12:22)  HR: 103 (06-10-23 @ 12:22) (101 - 105)  BP: 93/59 (06-10-23 @ 12:22) (81/46 - 108/62)  RR: 18 (06-10-23 @ 12:22) (18 - 32)  SpO2: 95% (06-10-23 @ 04:43) (92% - 98%)    06-09-23 @ 07:01  -  06-10-23 @ 07:00  --------------------------------------------------------  IN: 0 mL / OUT: 355 mL / NET: -355 mL    06-10-23 @ 07:01  -  06-10-23 @ 13:11  --------------------------------------------------------  IN: 0 mL / OUT: 600 mL / NET: -600 mL    Gen: NAD;   skin: numerous excoriations on arms/hands and left leg (pt tends to pick at skin)  HEENT: PERRL, mouth clr, nose clr  Neck: no nodes, no JVD, thyroid nl  chest: lt AICD  lungs: clr  hrt: s1 s2 reg; tachy; 2/6 sys murmur apex  abd: soft, NT/ND, no HS megaly  ext: no edema, no c/c  rt AKA - stump OK  lt leg w/ signif flexion contracture (prevents standing); has small ulcers on dorsal base of 1st toe; and small ulcers on tips of toes 2 and 4  neuro: aa, confused (not at baseline) ox1, cn grossly intact, can move arms fine; rt aka; lt leg w/ flex contract    LABS:                      11.0    (    78.2   8.20  )-----------( ---------      151      ( 10 Nael 2023 12:18 )             39.1    (    33.3     WBC Count: 8.20 K/uL (06-10-23 @ 12:18) - better  WBC Count: 6.43 K/uL (06-09-23 @ 08:06)  WBC Count: 9.23 K/uL (06-08-23 @ 04:07)  WBC Count: 11.14 K/uL (06-07-23 @ 04:55)  WBC Count: 12.21 K/uL (06-06-23 @ 04:43)    142   (   105   (   212      06-09-23 @ 16:00  ----------------------               6.2   (   23   (   23                             -----  heme             1.1  Ca  8.6   Mg  --    P   --     CAPILLARY BLOOD GLUCOSE  POCT Blood Glucose.: 149 (06-10-23 @ 11:34)  POCT Blood Glucose.: 187 (06-10-23 @ 07:43)  POCT Blood Glucose.: 224 (06-09-23 @ 21:18)  POCT Blood Glucose.: 230 (06-09-23 @ 17:16)  POCT Blood Glucose.: 230 (06-09-23 @ 16:11)  POCT Blood Glucose.: 207 (06-09-23 @ 11:38)  POCT Blood Glucose.: 281 (06-09-23 @ 06:34)  POCT Blood Glucose.: 246 (06-08-23 @ 17:47)    Culture - Blood (collected 06-06-23 @ 11:46)  Source: .Blood None  Preliminary Report (06-07-23 @ 22:01):    No growth to date.    Culture - Urine (collected 06-05-23 @ 12:15)  Source: Catheterized Catheterized  Final Report (06-08-23 @ 10:34):    >100,000 CFU/ml Klebsiella pneumoniae ESBL Multiple Morphological Strains  Organism: Klebsiella pneumoniae ESBL  Klebsiella pneumoniae ESBL (06-08-23 @ 10:34)  Organism: Klebsiella pneumoniae ESBL (06-08-23 @ 10:34)      -  Levofloxacin: R 4      -  Tobramycin: I 8      -  Nitrofurantoin: S <=32 Should not be used to treat pyelonephritis      -  Aztreonam: R >16      -  Gentamicin: R >8      -  Cefazolin: R >16 For uncomplicated UTI with K. pneumoniae, E. coli, or P. mirablis: YOLIE <=16 is sensitive and YOLIE >=32 is resistant. This also predicts results for oral agents cefaclor, cefdinir, cefpodoxime, cefprozil, cefuroxime axetil, cephalexin and locarbef for uncomplicated UTI. Note that some isolates may be susceptible to these agents while testing resistant to cefazolin.      -  Cefepime: R >16      -  Piperacillin/Tazobactam: S <=8      -  Ciprofloxacin: R >2      -  Imipenem: S <=1      -  Ceftriaxone: R >32 Enterobacter, Klebsiella aerogenes, Citrobacter, and Serratia may develop resistance during prolonged therapy      -  Ampicillin: R >16 These ampicillin results predict results for amoxicillin      Method Type: YOLIE      -  Meropenem: S <=1      -  Ampicillin/Sulbactam: R >16/8 Enterobacter, Klebsiella aerogenes, Citrobacter, and Serratia may develop resistance during prolonged therapy (3-4 days)      -  Cefuroxime: R >16      -  Amikacin: S <=16      -  Amoxicillin/Clavulanic Acid: S <=8/4 Consider reserving for cystitis when ampicillin/sulbactam is resistant      -  Trimethoprim/Sulfamethoxazole: R >2/38      -  Ertapenem: S <=0.5  Organism: Klebsiella pneumoniae ESBL (06-08-23 @ 10:34)      -  Levofloxacin: R 2      -  Tobramycin: S 4      -  Nitrofurantoin: S <=32 Should not be used to treat pyelonephritis      -  Aztreonam: R >16      -  Gentamicin: R >8      -  Cefazolin: R >16 For uncomplicated UTI with K. pneumoniae, E. coli, or P. mirablis: YOLIE <=16 is sensitive and YOLIE >=32 is resistant. This also predicts results for oral agents cefaclor, cefdinir, cefpodoxime, cefprozil, cefuroxime axetil, cephalexin and locarbef for uncomplicated UTI. Note that some isolates may be susceptible to these agents while testing resistant to cefazolin.      -  Cefepime: R 16      -  Piperacillin/Tazobactam: S <=8      -  Ciprofloxacin: R >2      -  Imipenem: S <=1      -  Ceftriaxone: R >32 Enterobacter, Klebsiella aerogenes, Citrobacter, and Serratia may develop resistance during prolonged therapy      -  Ampicillin: R >16 These ampicillin results predict results for amoxicillin      Method Type: YOLIE      -  Meropenem: S <=1      -  Ampicillin/Sulbactam: R >16/8 Enterobacter, Klebsiella aerogenes, Citrobacter, and Serratia may develop resistance during prolonged therapy (3-4 days)      -  Cefuroxime: R >16      -  Amikacin: S <=16      -  Amoxicillin/Clavulanic Acid: S <=8/4 Consider reserving for cystitis when ampicillin/sulbactam is resistant      -  Trimethoprim/Sulfamethoxazole: R >2/38      -  Ertapenem: S <=0.5    Culture - Blood (collected 06-04-23 @ 11:50)  Source: .Blood Blood  Final Report (06-09-23 @ 16:01):    No Growth Final    RADIOLOGY & ADDITIONAL TESTS:  < from: Xray Chest 1 View- PORTABLE-Urgent (Xray Chest 1 View- PORTABLE-Urgent .) (06.09.23 @ 14:13) >  Impression:    Left lower lobe opacity/pleural effusion. No air leak.    < end of copied text >    < from: CT Head No Cont (06.06.23 @ 18:56) >  IMPRESSION:    1.  No CT evidence for acute intracranial pathology.  2.  Mild to moderate chronic microvascular ischemic changes.    < end of copied text >    < from: VA Duplex Low Ext Arterial, Ltd, Left (06.06.23 @ 14:43) >  Impression:    Normal arterial flow in the left lower extremity.    < end of copied text >    < from: VA Duplex Lower Ext Vein Scan, Left (06.06.23 @ 14:40) >  Impression:    No evidence of deep venous thrombosis or superficial thrombophlebitis in   the left lower extremity.    < end of copied text >    < from: US Kidney and Bladder (06.06.23 @ 14:01) >  IMPRESSION:    No hydronephrosis.    < end of copied text >    < from: CT Abdomen and Pelvis w/ IV Cont (05.18.23 @ 11:00) >  IMPRESSION:    No CT evidence for acute traumatic injury within the chest, abdomen or pelvis.    Moderate to large bilateral pleural effusions, right greater than left.   Small to moderate volume abdominopelvic ascites.    Cardiomegaly with focal discontinuity anterior left atrial wall, unchanged.    Unchanged left renal pelvic dilatation with appearance of left UPJ obstruction.    < end of copied text >    < from: TTE Echo Complete w/ Contrast w/ Doppler (05.24.23 @ 10:49) >  Summary:   1. Severely decreased global left ventricular systolic function.   2. Multiple left ventricular regional wall motion abnormalities exist.   See wall motion findings.   3. LV Ejection Fraction by Wilkerson's Method with a biplane EF of 20 %.   4. Global cardiomyopathy.   5. Normal left ventricular internal cavity size.   6. Mildly enlarged right ventricle.   7. Moderately reduced RV systolic function.   8. Mildly enlarged right atrium.   9. Moderately enlarged left atrium.  10. There is no evidence of pericardial effusion.  11. Mild thickening and calcification of the anterior and posterior mitral valve leaflets.  12. Moderate mitral annular calcification.  13. Moderate mitral valve regurgitation.  14. Moderate-severe tricuspid regurgitation.    < end of copied text >    MEDICATIONS:  meropenem  IVPB 1000 milliGRAM(s) IV Intermittent every 12 hours    acetaminophen     Tablet .. 325 milliGRAM(s) Oral every 4 hours PRN  aspirin enteric coated 81 milliGRAM(s) Oral daily  chlorhexidine 2% Cloths 1 Application(s) Topical daily  DULoxetine 30 milliGRAM(s) Oral <User Schedule>  folic acid 1 milliGRAM(s) Oral <User Schedule>  heparin   Injectable 5000 Unit(s) SubCutaneous every 12 hours  hydrocortisone 2.5% Cream 1 Application(s) Topical two times a day  insulin glargine Injectable (LANTUS) 12 Unit(s) SubCutaneous every morning <at home up to 44>  insulin lispro (ADMELOG) corrective regimen sliding scale   SubCutaneous three times a day before meals  levothyroxine 25 MICROGram(s) Oral <User Schedule>  levothyroxine 50 MICROGram(s) Oral <User Schedule>  mupirocin 2% Ointment 1 Application(s) Both Nostrils two times a day  pantoprazole  Injectable 40 milliGRAM(s) IV Push daily  polyethylene glycol 3350 17 Gram(s) Oral two times a day  prasugrel 10 milliGRAM(s) Oral daily  senna 2 Tablet(s) Oral at bedtime    Home Meds NOT above:  toprol xl 25 q24  torsemide 20 q24  Crestor 40mg qhs  valium 1mg prn anx  tamsulosin q12  centrum q24  aldactone 25 q24  Pred 5mg po q24  Percocet prn  MTX 10mg q Wed (on folate)

## 2023-06-10 NOTE — PHYSICAL THERAPY INITIAL EVALUATION ADULT - GENERAL OBSERVATIONS, REHAB EVAL
Pt s/p CCU upgrade, intubated, off sedation possible extubation today. PT on hold, need new PT consult for re-evaluation.
PT IE [id number]. Chart reviewed and case discussed with NICANOR Navas. Pt encountered semi-richey in bed with L knee kept in flexion. pt is alert, confused and oriented x 1.In NAD. + IV lock. + call bell
Pt seen from [id number]. Pt encountered in the bed, +confusion/lethargic, agreeable for b/s PT, R AKA, +donine tele.

## 2023-06-10 NOTE — PHYSICAL THERAPY INITIAL EVALUATION ADULT - ADDITIONAL COMMENTS
+L LE flexion contracture, severe limitation of ROM B UE all planes, poor  strength
Pt reported lives with spouse however, unable to provide details 2* confusion/lethargy.

## 2023-06-10 NOTE — PHYSICAL THERAPY INITIAL EVALUATION ADULT - PLANNED THERAPY INTERVENTIONS, PT EVAL
balance training/bed mobility training/ROM/strengthening/transfer training
balance training/bed mobility training/strengthening/transfer training

## 2023-06-10 NOTE — PHYSICAL THERAPY INITIAL EVALUATION ADULT - MARITAL STATUS
67-year-old male w/ HFrEF 15-20% s/p ICD, decreased RV function, mild MR/TR, DM type I w/ neuropathy and hx hypoglycemia , DL, HTN, CAD, hx MI, s/p CABG, s/p stent (2018), hx in-stent thrombosis while on Plavix, PAD s/p 3 LLE stents,/

## 2023-06-10 NOTE — PHYSICAL THERAPY INITIAL EVALUATION ADULT - LEVEL OF INDEPENDENCE: BED TO CHAIR, REHAB EVAL
NA: secondary current functional and cognitive status. OOBTC using full body lift/unable to perform
2* confusion/lethargy/unable to perform

## 2023-06-10 NOTE — SWALLOW BEDSIDE ASSESSMENT ADULT - PHARYNGEAL PHASE
Delayed cough post oral intake/Delayed throat clear post oral intake/Multiple swallows Delayed throat clear post oral intake

## 2023-06-10 NOTE — PROGRESS NOTE ADULT - ASSESSMENT
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 He states he was lying in bed at home when he fell asleep and fell off the bed. Found to have moises and mild elevation of trop     # downgrade from CCU; intub 6/5; extub 6/8; was in cardiogenic and septic shocks    # baseline fxn per family: pt has severe lt leg flexion contracture, which prohibits standing and ability to get prosthetic for rt leg; pt needs asst, but can help w/ transfers to WC or car; pt has not been in shower in years, just sponge baths; MS is usually "normal" (perhaps rare, slight forgetfulness); can feed self; needs asst w/ dressing; cannot do chores; family cares for him; pt, with asst, can get out of house - has ramps, uses slide board to get in and out of car, uses WC to get around    # hospital delirum; poss mild vasc-induced cog impairment  pt was intubated for failure to protect own airway (not really resp failure)  pt is NOT usually hypercapnic (see ABGs)  CTH noted: chr microvasc changes  EEG -ve  can use melatonin for sleep  not req anti-psych  Sp/Sw (spoke w/ Nesha): puree + mild thick liquids; mod Ba Sw on MON  pt pulled out NGT    # sev malnutrition; hypernatremia  add ensure 3x/day  add MVI q24  dietician eval  c/w oral hydration up to 1.5 L /day    # skin excoriations; periph neuropathy 2/2 DM and vasc  can use bacitracin on skin  MRSA nares +: bactroban oint to nares top q12 for 5 days (6/7-6/12)  sponge bath  start neurontin 100mg po q12  c/w Duloxetine 30mg 3x/day  d/c HCT top cream - not good to use on NON-intact skin, like excoriations  can tx pain as needed    # HFrEF 20%; biventricular dysfxn; dilated CM; mod MR; mod-sev TR; HTN; CAD; MI; s/p CABG; s/p stent; PAD s/p stent and rt AKA;   no GDMT since BP low (pt allergic to ACEI and Entresto)  HOLD toprol 25mg q24  diuretics (torsemide and aldactone) on hold  cardio eval: Dr Nelson  CHF f/u: Dr Fernandez  currently off tele  c/w DAPT: asa + prasugrel (pt allergic to plavix)  daily wts  on RA    # UTI 2/2 mult strains ESBL Kleb  ID noted  abx: laquita 1gm iv q12 til 6/14  BCx neg    # CKD 3; MOISES; anemia of chr dz (plus iron def anemia)  base Cr low 1s  peak CR 3.6  stable    # micro anemia  iron sat 3%; ferr 56  s/p venofer x5days  will use oral iron as outpt  will need OUTPT GI eval once stable as outpt for awhile    # DM T2 w/ neuropathy  diabetic DASH diet  FS qac/hs - goal 100-180  c/w lantu2 12 HS (on up to 44 units at home)  SSI - might need lisp w/ meals    # hypothyroid; anit TPO Ab +  synthroid 25 mcg q24 (50 on Sn)  TSH 5.6 in 1/2023  outpt f/u    # DLD  on crestor at home  pt allergic to lipitor  statins on hold  outpt f/u lipid panel  LFTs were prob abnl from passive congestion - LFTs better    # Rheum Arth  c/w MTX 10mg po q wed + folate 1mg po q24  Holding Pred 5mg po q24    # bowel reg cont  PEG q12 + senna 2 HS    # TIAGO  CPAP not in use 2/2 claustrophobia    # BPH  might need flomax 0.4mg po qhs back soon (usually on twice a day at home)    # anxiety  on valium 1-2mg at home prn - hold off for now 2/2 delirium    # activity: family can bring in anti-flex contracture device; PT eval; OOBTC as bethany    # DVT ppx: Hep sc q12    # GI ppx: change PPI to 40mg po q24 (not IV)    # updated family (wife, son, dtr) at bedside; answered ques; gave my card    # Code Status: Full     Dispo: mod Ba Sw MON; cardio eval; CHF f/u; IV abx; tx DM; watch BP; tx skin; bactroban to 6/12;  eventually, pt will go to SNF for STR vs home - f/u CM and PT - still acute    Prog is guarded. 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 He states he was lying in bed at home when he fell asleep and fell off the bed. Found to have moises and mild elevation of trop     # downgrade from CCU; intub 6/5; extub 6/8; was in cardiogenic and septic shocks  STAR PATIENT    # baseline fxn per family: pt has severe lt leg flexion contracture, which prohibits standing and ability to get prosthetic for rt leg; pt needs asst, but can help w/ transfers to WC or car; pt has not been in shower in years, just sponge baths; MS is usually "normal" (perhaps rare, slight forgetfulness); can feed self; needs asst w/ dressing; cannot do chores; family cares for him; pt, with asst, can get out of house - has ramps, uses slide board to get in and out of car, uses WC to get around    # hospital delirum; poss mild vasc-induced cog impairment  pt was intubated for failure to protect own airway (not really resp failure)  pt is NOT usually hypercapnic (see ABGs)  CTH noted: chr microvasc changes  EEG -ve  can use melatonin for sleep  not req anti-psych  Sp/Sw (spoke w/ Nesha): puree + mild thick liquids; mod Ba Sw on MON  pt pulled out NGT    # sev malnutrition; hypernatremia; hx of DISH causing dysphagia (per SP/Sw)  add ensure 3x/day  add MVI q24  dietician eval  c/w oral hydration up to 1.5 L /day    # skin excoriations; periph neuropathy 2/2 DM and vasc  can use bacitracin on skin  MRSA nares +: bactroban oint to nares top q12 for 5 days (6/7-6/12)  sponge bath  start neurontin 100mg po q12  c/w Duloxetine 30mg 3x/day  d/c HCT top cream - not good to use on NON-intact skin, like excoriations  can tx pain as needed    # HFrEF 20%; biventricular dysfxn; dilated CM; mod MR; mod-sev TR; HTN; CAD; MI; s/p CABG; s/p stent; PAD s/p stent and rt AKA;   no GDMT since BP low (pt allergic to ACEI and Entresto)  HOLD toprol 25mg q24  diuretics (torsemide and aldactone) on hold  cardio eval: Dr Nelson  CHF f/u: Dr Fernandez  currently off tele  c/w DAPT: asa + prasugrel (pt allergic to plavix)  daily wts  on RA    # UTI 2/2 mult strains ESBL Kleb  ID noted  abx: laquita 1gm iv q12 til 6/14  BCx neg    # CKD 3; MOISES; anemia of chr dz (plus iron def anemia)  base Cr low 1s  peak CR 3.6  stable    # micro anemia  iron sat 3%; ferr 56  s/p venofer x5days  will use oral iron as outpt  will need OUTPT GI eval once stable as outpt for awhile    # DM T2 w/ neuropathy  diabetic DASH diet  FS qac/hs - goal 100-180  c/w lantu2 12 HS (on up to 44 units at home)  SSI - might need lisp w/ meals    # hypothyroid; anit TPO Ab +  synthroid 25 mcg q24 (50 on Sn)  TSH 5.6 in 1/2023  outpt f/u    # DLD  on crestor at home  pt allergic to lipitor  statins on hold  outpt f/u lipid panel  LFTs were prob abnl from passive congestion - LFTs better    # Rheum Arth  c/w MTX 10mg po q wed + folate 1mg po q24  Holding Pred 5mg po q24    # bowel reg cont  PEG q12 + senna 2 HS    # TIAGO  CPAP not in use 2/2 claustrophobia    # BPH  might need flomax 0.4mg po qhs back soon (usually on twice a day at home)    # anxiety  on valium 1-2mg at home prn - hold off for now 2/2 delirium    # activity: family can bring in anti-flex contracture device; PT eval; OOBTC as bethany    # DVT ppx: Hep sc q12    # GI ppx: change PPI to 40mg po q24 (not IV)    # updated family (wife, son, dtr) at bedside; answered ques; gave my card    # Code Status: Full     Dispo: mod Ba Sw MON; cardio eval; CHF f/u; IV abx; tx DM; watch BP; tx skin; bactroban to 6/12;  eventually, pt will go to SNF for STR vs home - f/u CM and PT - still acute    Prog is guarded.

## 2023-06-10 NOTE — PHYSICAL THERAPY INITIAL EVALUATION ADULT - PERTINENT HX OF CURRENT PROBLEM, REHAB EVAL
67-year-old male w/ HFrEF 15-20% s/p ICD, decreased RV function, mild MR/TR, DM type I w/ neuropathy and hx hypoglycemia , DL, HTN, CAD, hx MI, s/p CABG, s/p stent (2018), hx in-stent thrombosis while on Plavix, PAD s/p 3 LLE stents,
67-year-old male w/ HFrEF 15-20% s/p ICD, decreased RV function, mild MR/TR, DM type I w/ neuropathy and hx hypoglycemia , DL, HTN, CAD, hx MI, s/p CABG, s/p stent (2018), hx in-stent thrombosis while on Plavix, PAD s/p 3 LLE stents, TIAGO (needs CPAP auto-regulating pressure 10-16, patient doesn't use it due to claustrophobia), Psoriasis, R-AKA presenting to ED s/p fall. He states he was lying in bed at home when he fell asleep and fell off the bed, and complains of generalized headache neck pain, non-radiating, no alleviating or aggravating factors, associated with right anterior chest wall pain. Denies LOC,  fevers, chills, nausea, vomiting, dizziness, abdominal pain, chest pain or shortness of breath.

## 2023-06-10 NOTE — PHYSICAL THERAPY INITIAL EVALUATION ADULT - LEVEL OF INDEPENDENCE: SUPINE/SIT, REHAB EVAL
pt present swith posteriot lean on sitting and needs max encourement and education/maximum assist (25% patients effort)
maximum assist (25% patients effort)

## 2023-06-10 NOTE — PHYSICAL THERAPY INITIAL EVALUATION ADULT - AMBULATION SKILLS, REHAB EVAL
67-year-old male w/ HFrEF 15-20% s/p ICD, decreased RV function, mild MR/TR, DM type I w/ neuropathy and hx hypoglycemia , DL, HTN, CAD, hx MI, s/p CABG, s/p stent (2018), hx in-stent thrombosis while on Plavix, PAD s/p 3 LLE stents,

## 2023-06-11 NOTE — PROGRESS NOTE ADULT - ASSESSMENT
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 He states he was lying in bed at home when he fell asleep and fell off the bed. Found to have moises and mild elevation of trop     # downgrade from CCU; intub 6/5; extub 6/8; was in cardiogenic and septic shocks  STAR PATIENT    # baseline fxn per family: pt has severe lt leg flexion contracture, which prohibits standing and ability to get prosthetic for rt leg; pt needs asst, but can help w/ transfers to WC or car; pt has not been in shower in years, just sponge baths; MS is usually "normal" (perhaps rare, slight forgetfulness); can feed self; needs asst w/ dressing; cannot do chores; family cares for him; pt, with asst, can get out of house - has ramps, uses slide board to get in and out of car, uses WC to get around    # hospital delirum; poss mild vasc-induced cog impairment  pt was intubated for failure to protect own airway (not really resp failure)  pt is NOT usually hypercapnic (see ABGs)  CTH noted: chr microvasc changes  EEG -ve  can use melatonin for sleep  not req anti-psych  Sp/Sw (spoke w/ Nesha): puree + mild thick liquids; mod Ba Sw on MON  pt pulled out NGT    # sev malnutrition; hypernatremia; hx of DISH causing dysphagia (per SP/Sw)  add ensure 3x/day  MVI w/ min q24  dietician eval  c/w oral hydration up to 1.5 L /day    # skin excoriations; periph neuropathy 2/2 DM and vasc  can use bacitracin on skin  MRSA nares +: bactroban oint to nares top q12 for 5 days (6/7-6/12)  sponge bath  c/w neurontin 100mg po q12  c/w Duloxetine 30mg 3x/day  d/c HCT top cream - not good to use on NON-intact skin, like excoriations  can tx pain as needed    # HFrEF 20%; biventricular dysfxn; dilated CM; mod MR; mod-sev TR; HTN; CAD; MI; s/p CABG; s/p stent; PAD s/p stent and rt AKA;   no GDMT since BP low (pt allergic to ACEI and Entresto)  HOLD toprol xl 25mg q24  diuretics (torsemide and aldactone) on hold  cardio eval: Dr Nelson  CHF f/u: Dr Fernandez  currently off tele  c/w DAPT: asa + prasugrel (pt allergic to plavix)  daily wts  on RA    # UTI 2/2 mult strains ESBL Kleb  ID noted  abx: laquita 1gm iv q12 til 6/14  BCx neg    # CKD 3; MOISES; anemia of chr dz (plus iron def anemia)  base Cr low 1s  peak CR 3.6  stable    # micro anemia  iron sat 3%; ferr 56  s/p venofer x5days  will use oral iron as outpt  will need OUTPT GI eval once stable as outpt for awhile    # DM T2 w/ neuropathy  diabetic DASH diet  FS qac/hs - goal 100-180  c/w lantu2 12 HS (on up to 44 units at home)  SSI - might need lisp w/ meals    # hypothyroid; anit TPO Ab +  synthroid 25 mcg q24 (50 on Sn)  TSH 5.6 in 1/2023  outpt f/u    # DLD  on crestor at home  pt allergic to lipitor  statins on hold  outpt f/u lipid panel  LFTs were prob abnl from passive congestion - LFTs better    # Rheum Arth  c/w MTX 10mg po q wed + folate 1mg po q24  Holding Pred 5mg po q24    # bowel reg cont  PEG q12 + senna 2 HS    # TIAGO  CPAP not in use 2/2 claustrophobia    # BPH  might need flomax 0.4mg po qhs back soon (usually on twice a day at home)    # anxiety  on valium 1-2mg at home prn - hold off for now 2/2 delirium    # activity: family can bring in anti-flex contracture device; PT eval; OOBTC as bethany    # DVT ppx: Hep sc q12    # GI ppx: change PPI to 40mg po q24 (not IV)    # Code Status: Full     Dispo: mod Ba Sw MON; cardio eval; CHF f/u; IV abx; tx DM; watch BP; tx skin; bactroban to 6/12; add ensure  eventually, pt will go to SNF for STR vs home - f/u CM and PT - still acute    Prog is guarded.

## 2023-06-11 NOTE — PROGRESS NOTE ADULT - SUBJECTIVE AND OBJECTIVE BOX
Denies any chest pain, SOB or palpitations laying flat in bed creatinine back to normal. Family by bedside          MEDICATIONS  (STANDING):  aspirin enteric coated 81 milliGRAM(s) Oral daily  bacitracin   Ointment 1 Application(s) Topical every 12 hours  chlorhexidine 2% Cloths 1 Application(s) Topical daily  dextrose 5%. 1000 milliLiter(s) (50 mL/Hr) IV Continuous <Continuous>  dextrose 5%. 1000 milliLiter(s) (100 mL/Hr) IV Continuous <Continuous>  dextrose 50% Injectable 25 Gram(s) IV Push once  dextrose 50% Injectable 12.5 Gram(s) IV Push once  dextrose 50% Injectable 25 Gram(s) IV Push once  DULoxetine 30 milliGRAM(s) Oral <User Schedule>  folic acid 1 milliGRAM(s) Oral <User Schedule>  gabapentin 100 milliGRAM(s) Oral every 12 hours  glucagon  Injectable 1 milliGRAM(s) IntraMuscular once  heparin   Injectable 5000 Unit(s) SubCutaneous every 12 hours  insulin glargine Injectable (LANTUS) 12 Unit(s) SubCutaneous every morning  insulin lispro (ADMELOG) corrective regimen sliding scale   SubCutaneous three times a day before meals  levothyroxine 25 MICROGram(s) Oral <User Schedule>  levothyroxine 50 MICROGram(s) Oral <User Schedule>  meropenem  IVPB 1000 milliGRAM(s) IV Intermittent every 12 hours  multivitamin/minerals 1 Tablet(s) Oral daily  mupirocin 2% Ointment 1 Application(s) Both Nostrils two times a day  pantoprazole    Tablet 40 milliGRAM(s) Oral before breakfast  polyethylene glycol 3350 17 Gram(s) Oral two times a day  prasugrel 10 milliGRAM(s) Oral daily  senna 2 Tablet(s) Oral at bedtime    MEDICATIONS  (PRN):  acetaminophen     Tablet .. 325 milliGRAM(s) Oral every 4 hours PRN Moderate Pain (4 - 6)  dextrose Oral Gel 15 Gram(s) Oral once PRN Blood Glucose LESS THAN 70 milliGRAM(s)/deciliter            Vital Signs Last 24 Hrs  T(C): 36.1 (11 Jun 2023 22:01), Max: 36.2 (11 Jun 2023 14:05)  T(F): 97 (11 Jun 2023 22:01), Max: 97.2 (11 Jun 2023 14:05)  HR: 108 (11 Jun 2023 22:01) (103 - 108)  BP: 107/68 (11 Jun 2023 22:01) (104/72 - 108/72)  BP(mean): --  RR: 18 (11 Jun 2023 22:01) (18 - 18)  SpO2: 100% (11 Jun 2023 20:42) (84% - 100%)    Parameters below as of 11 Jun 2023 20:42  Patient On (Oxygen Delivery Method): nasal cannula  O2 Flow (L/min): 1       REVIEW OF SYSTEMS:  CONSTITUTIONAL: No fever, weight loss, or fatigue  CARDIOLOGY: Patient denies chest pain, shortness of breath or syncopal episodes.   RESPIRATORY: denies shortness of breath, wheezeing.   NEUROLOGICAL: No weakness, no focal deficits to report.  GI: no BRBPR, no N,V,diarrhea.    PSYCHIATRY: normal mood and affect  HEENT: no nasal discharge, no ecchymosis         PHYSICAL EXAM:  · CONSTITUTIONAL:	looks malnourished   ·RESPIRATORY:   airway patent; breath sounds equal; good air movement; respirations non-labored; decreased BS bilaterally L >> R   · CARDIOVASCULAR	regular rate and rhythm  no rub  + YULISA   · EXTREMITIES: No cyanosis, clubbing or edema  · VASCULAR: absent left AT/PT   	    LABS:                        11.0   8.20  )-----------( 151      ( 10 Nael 2023 12:18 )             39.1     06-10    141  |  105  |  21<H>  ----------------------------<  177<H>  TNP   |  31  |  1.0    Ca    8.5      10 Nael 2023 12:18  Mg     2.1     06-10    TPro  6.3  /  Alb  3.2<L>  /  TBili  2.3<H>  /  DBili  x   /  AST  77<H>  /  ALT  34  /  AlkPhos  78  06-10            I&O's Summary    10 Nael 2023 07:01  -  11 Jun 2023 07:00  --------------------------------------------------------  IN: 0 mL / OUT: 600 mL / NET: -600 mL    11 Jun 2023 07:01  -  11 Jun 2023 22:34  --------------------------------------------------------  IN: 0 mL / OUT: 800 mL / NET: -800 mL      BNP  RADIOLOGY & ADDITIONAL STUDIES:    IMPRESSION AND PLAN:    Continue current care as per medical team   Restart diuretics every other day   Keep negative balance / Fluids restriction   DVT/GI prophylaxis   Physical therapy/Rehab   Urology evaluation and follow up   Will F/U

## 2023-06-11 NOTE — PROGRESS NOTE ADULT - ASSESSMENT
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 He states he was lying in bed at home when he fell asleep and fell off the bed. Found to have moises and mild elevation of trop     # downgrade from CCU; intub 6/5; extub 6/8; was in cardiogenic and septic shocks  STAR PATIENT    # baseline fxn per family: pt has severe lt leg flexion contracture, which prohibits standing and ability to get prosthetic for rt leg; pt needs asst, but can help w/ transfers to WC or car; pt has not been in shower in years, just sponge baths; MS is usually "normal" (perhaps rare, slight forgetfulness); can feed self; needs asst w/ dressing; cannot do chores; family cares for him; pt, with asst, can get out of house - has ramps, uses slide board to get in and out of car, uses WC to get around    # hospital delirum; poss mild vasc-induced cog impairment  pt was intubated for failure to protect own airway (not really resp failure)  pt is NOT usually hypercapnic (see ABGs)  CTH noted: chr microvasc changes  EEG -ve  can use melatonin for sleep  not req anti-psych  Sp/Sw (spoke w/ Nesha): puree + mild thick liquids; mod Ba Sw on MON  pt pulled out NGT    # sev malnutrition; hypernatremia; hx of DISH causing dysphagia (per SP/Sw)  add ensure 3x/day  add MVI q24  dietician eval  c/w oral hydration up to 1.5 L /day    # skin excoriations; periph neuropathy 2/2 DM and vasc  can use bacitracin on skin  MRSA nares +: bactroban oint to nares top q12 for 5 days (6/7-6/12)  sponge bath  start neurontin 100mg po q12  c/w Duloxetine 30mg 3x/day  d/c HCT top cream - not good to use on NON-intact skin, like excoriations  can tx pain as needed    # HFrEF 20%; biventricular dysfxn; dilated CM; mod MR; mod-sev TR; HTN; CAD; MI; s/p CABG; s/p stent; PAD s/p stent and rt AKA;   no GDMT since BP low (pt allergic to ACEI and Entresto)  HOLD toprol 25mg q24  diuretics (torsemide and aldactone) on hold  cardio eval: Dr Nelson  CHF f/u: Dr Fernandez  currently off tele  c/w DAPT: asa + prasugrel (pt allergic to plavix)  daily wts  on RA    # UTI 2/2 mult strains ESBL Kleb  ID noted  abx: laquita 1gm iv q12 til 6/14  BCx neg    # CKD 3; MOISES; anemia of chr dz (plus iron def anemia)  base Cr low 1s  peak CR 3.6  stable    # micro anemia  iron sat 3%; ferr 56  s/p venofer x5days  will use oral iron as outpt  will need OUTPT GI eval once stable as outpt for awhile    # DM T2 w/ neuropathy  diabetic DASH diet  FS qac/hs - goal 100-180  c/w lantu2 12 HS (on up to 44 units at home)  SSI - might need lisp w/ meals    # hypothyroid; anit TPO Ab +  synthroid 25 mcg q24 (50 on Sn)  TSH 5.6 in 1/2023  outpt f/u    # DLD  on crestor at home  pt allergic to lipitor  statins on hold  outpt f/u lipid panel  LFTs were prob abnl from passive congestion - LFTs better    # Rheum Arth  c/w MTX 10mg po q wed + folate 1mg po q24  Holding Pred 5mg po q24    # bowel reg cont  PEG q12 + senna 2 HS    # TIAGO  CPAP not in use 2/2 claustrophobia    # BPH  might need flomax 0.4mg po qhs back soon (usually on twice a day at home)    # anxiety  on valium 1-2mg at home prn - hold off for now 2/2 delirium    # activity: family can bring in anti-flex contracture device; PT eval; OOBTC as bethany    # DVT ppx: Hep sc q12    # GI ppx: change PPI to 40mg po q24 (not IV)    # updated family (wife, son, dtr) at bedside; answered ques; gave my card    # Code Status: Full     Dispo: mod Ba Sw MON; cardio eval; CHF f/u; IV abx; tx DM; watch BP; tx skin; bactroban to 6/12;  eventually, pt will go to SNF for STR vs home - f/u CM and PT - still acute    Prog is guarded.

## 2023-06-11 NOTE — PROGRESS NOTE ADULT - SUBJECTIVE AND OBJECTIVE BOX
OVERNIGHT EVENTS: Patient is febrile overnight, NAD s/p tylenols and      SUBJECTIVE / INTERVAL HPI: Patient seen and examined at bedside.     VITAL SIGNS:  Vital Signs Last 24 Hrs  T(C): 36.1 (11 Jun 2023 04:47), Max: 36.4 (10 Nael 2023 12:22)  T(F): 96.9 (11 Jun 2023 04:47), Max: 97.6 (10 Nael 2023 12:22)  HR: 103 (11 Jun 2023 04:47) (103 - 104)  BP: 104/72 (11 Jun 2023 04:47) (93/59 - 104/72)  BP(mean): --  RR: 18 (11 Jun 2023 04:47) (18 - 18)  SpO2: 100% (11 Jun 2023 04:47) (100% - 100%)    PHYSICAL EXAM:    General: Awake, +hoarse  HEENT: NC/AT; PERRL, anicteric sclera;   Neck: supple  Cardiovascular: +S1/S2, RRR  Respiratory: mild rhonchi  Gastrointestinal: soft, NT/ND; +BSx4  Extremities: WWP; R. AKA, no edema, clubbing or cyanosis  Vascular: 2+ radial, DP/PT pulses B/L    MEDICATIONS:  MEDICATIONS  (STANDING):  aspirin enteric coated 81 milliGRAM(s) Oral daily  bacitracin   Ointment 1 Application(s) Topical every 12 hours  chlorhexidine 2% Cloths 1 Application(s) Topical daily  dextrose 5%. 1000 milliLiter(s) (50 mL/Hr) IV Continuous <Continuous>  dextrose 5%. 1000 milliLiter(s) (100 mL/Hr) IV Continuous <Continuous>  dextrose 50% Injectable 25 Gram(s) IV Push once  dextrose 50% Injectable 12.5 Gram(s) IV Push once  dextrose 50% Injectable 25 Gram(s) IV Push once  DULoxetine 30 milliGRAM(s) Oral <User Schedule>  folic acid 1 milliGRAM(s) Oral <User Schedule>  gabapentin 100 milliGRAM(s) Oral every 12 hours  glucagon  Injectable 1 milliGRAM(s) IntraMuscular once  heparin   Injectable 5000 Unit(s) SubCutaneous every 12 hours  insulin glargine Injectable (LANTUS) 12 Unit(s) SubCutaneous every morning  insulin lispro (ADMELOG) corrective regimen sliding scale   SubCutaneous three times a day before meals  levothyroxine 25 MICROGram(s) Oral <User Schedule>  levothyroxine 50 MICROGram(s) Oral <User Schedule>  meropenem  IVPB 1000 milliGRAM(s) IV Intermittent every 12 hours  mupirocin 2% Ointment 1 Application(s) Both Nostrils two times a day  pantoprazole    Tablet 40 milliGRAM(s) Oral before breakfast  polyethylene glycol 3350 17 Gram(s) Oral two times a day  prasugrel 10 milliGRAM(s) Oral daily  senna 2 Tablet(s) Oral at bedtime    MEDICATIONS  (PRN):  acetaminophen     Tablet .. 325 milliGRAM(s) Oral every 4 hours PRN Moderate Pain (4 - 6)  dextrose Oral Gel 15 Gram(s) Oral once PRN Blood Glucose LESS THAN 70 milliGRAM(s)/deciliter      ALLERGIES:  Allergies    ACE inhibitors (Rash)  Entresto (Swelling)  Atorvastatin Calcium (Unknown)  Bactrim (Unknown)  Plavix (Unknown)    Intolerances        LABS:                        11.0   8.20  )-----------( 151      ( 10 Nael 2023 12:18 )             39.1     06-10    141  |  105  |  21<H>  ----------------------------<  177<H>  TNP   |  31  |  1.0    Ca    8.5      10 Nael 2023 12:18  Mg     2.1     06-10    TPro  6.3  /  Alb  3.2<L>  /  TBili  2.3<H>  /  DBili  x   /  AST  77<H>  /  ALT  34  /  AlkPhos  78  06-10        CAPILLARY BLOOD GLUCOSE      POCT Blood Glucose.: 115 mg/dL (10 Nael 2023 21:06)      RADIOLOGY & ADDITIONAL TESTS: Reviewed.    PLAN:

## 2023-06-12 NOTE — SWALLOW VFSS/MBS ASSESSMENT ADULT - RECOMMENDED FEEDING/EATING TECHNIQUES
multiple swallows/allow for swallow between intakes/alternate food with liquid/crush medication (when feasible)/oral hygiene/position upright (90 degrees)/small sips/bites

## 2023-06-12 NOTE — SWALLOW VFSS/MBS ASSESSMENT ADULT - DIAGNOSTIC IMPRESSIONS
severe pharyngeal dysphagia for thin liquids; moderate pharyngeal dysphagia for mildly thick liquids and puree consistencies

## 2023-06-12 NOTE — PROGRESS NOTE ADULT - ASSESSMENT
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 He states he was lying in bed at home when he fell asleep and fell off the bed. Found to have moises and mild elevation of trop     # downgrade from CCU; intub 6/5; extub 6/8; was in cardiogenic and septic shocks  STAR PATIENT    # baseline fxn per family: pt has severe lt leg flexion contracture, which prohibits standing and ability to get prosthetic for rt leg; pt needs asst, but can help w/ transfers to WC or car; pt has not been in shower in years, just sponge baths; MS is usually "normal" (perhaps rare, slight forgetfulness); can feed self; needs asst w/ dressing; cannot do chores; family cares for him; pt, with asst, can get out of house - has ramps, uses slide board to get in and out of car, uses WC to get around    # hospital delirum; poss mild vasc-induced cog impairment  pt was intubated for failure to protect own airway (not really resp failure)  pt is NOT usually hypercapnic (see ABGs)  CTH noted: chr microvasc changes  EEG -ve  can use melatonin for sleep  not req anti-psych  Sp/Sw (spoke w/ Nesha): puree + mild thick liquids; mod Ba Sw on MON, follow recommendations   pt pulled out NGT    # sev malnutrition; hypernatremia; hx of DISH causing dysphagia (per SP/Sw)  add ensure 3x/day  MVI w/ min q24  dietician eval  c/w oral hydration up to 1.5 L /day    # skin excoriations; periph neuropathy 2/2 DM and vasc  can use bacitracin on skin  MRSA nares +: bactroban oint to nares top q12 for 5 days (6/7-6/12)  sponge bath  c/w neurontin 100mg po q12  c/w Duloxetine 30mg 3x/day  d/c HCT top cream - not good to use on NON-intact skin, like excoriations  can tx pain as needed    # HFrEF 20%; biventricular dysfxn; dilated CM; mod MR; mod-sev TR; HTN; CAD; MI; s/p CABG; s/p stent; PAD s/p stent and rt AKA;   no GDMT since BP low (pt allergic to ACEI and Entresto)  HOLD toprol xl 25mg q24  diuretics (torsemide and aldactone) on hold  cardio eval: Dr Nelson  CHF f/u: Dr Fernandez  started on diuretics every other days as per cards   currently off tele  c/w DAPT: asa + prasugrel (pt allergic to plavix)  daily wts  on RA    # UTI 2/2 mult strains ESBL Kleb  ID noted  abx: laquita 1gm iv q12 til 6/14  BCx neg  follow ID     # CKD 3; MOISES; anemia of chr dz (plus iron def anemia)  base Cr low 1s  peak CR 3.6  stable    # micro anemia  iron sat 3%; ferr 56  s/p venofer x5days  will use oral iron as outpt  will need OUTPT GI eval once stable as outpt for awhile    # DM T2 w/ neuropathy  diabetic DASH diet  FS qac/hs - goal 100-180  c/w lantu2 12 HS (on up to 44 units at home)  SSI - might need lisp w/ meals    # hypothyroid; anit TPO Ab +  synthroid 25 mcg q24 (50 on Sn)  TSH 5.6 in 1/2023  outpt f/u    # DLD  on crestor at home  pt allergic to lipitor  statins on hold  outpt f/u lipid panel  LFTs were prob abnl from passive congestion - LFTs better    # Rheum Arth  c/w MTX 10mg po q wed + folate 1mg po q24  Holding Pred 5mg po q24    # bowel reg cont  PEG q12 + senna 2 HS    # TIAGO  CPAP not in use 2/2 claustrophobia    # BPH  might need flomax 0.4mg po qhs back soon (usually on twice a day at home)    # anxiety  on valium 1-2mg at home prn - hold off for now 2/2 delirium    # activity: family can bring in anti-flex contracture device; PT eval; OOBTC as bethany    # DVT ppx: Hep sc q12    # GI ppx: change PPI to 40mg po q24 (not IV)    # Code Status: Full     Dispo: mod Ba Sw MON; cardio eval follow up, ; CHF f/u; IV abx; tx DM; watch BP; tx skin; bactroban to 6/12; add ensure  eventually, pt will go to SNF for STR vs home - f/u CM and PT - still acute  follow modified barium swallow   follow daily labs   abx till 6/14     Prog is guarded.    Patients is high risk of clinical deterioration and demise. family aware. updated family at length

## 2023-06-12 NOTE — PROGRESS NOTE ADULT - ASSESSMENT
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 He states he was lying in bed at home when he fell asleep and fell off the bed. Found to have moises and mild elevation of trop     # Downgrade from CCU; intub 6/5; extub 6/8; was in cardiogenic and septic shocks    # Baseline fxn per family: pt has severe lt leg flexion contracture, which prohibits standing and ability to get prosthetic for rt leg; pt needs asst, but can help w/ transfers to WC or car; pt has not been in shower in years, just sponge baths; MS is usually "normal" (perhaps rare, slight forgetfulness); can feed self; needs asst w/ dressing; cannot do chores; family cares for him; pt, with asst, can get out of house - has ramps, uses slide board to get in and out of car, uses WC to get around    # Hospital delirum; poss mild vasc-induced cog impairment  pt was intubated for failure to protect own airway (not really resp failure)  pt is NOT usually hypercapnic (see ABGs)  CTH noted: chr microvasc changes  EEG -ve  can use melatonin for sleep  pt pulled out NGT  not req anti-psych  Sp/Sw (spoke w/ Nesha): puree + mild thick liquids  s/p Mod. barium swallow today- c/w current diet, pt. at high risk of aspiration, needs multiple swallows, monitor for weight loss and malnutrition, aspiration precautions    # Severe malnutrition; hypernatremia; hx of DISH causing dysphagia (per SP/Sw)  add ensure 3x/day  MVI w/ min q24  dietician eval  c/w oral hydration up to 1.5 L /day    # Skin excoriations; periph neuropathy 2/2 DM and vasc  can use bacitracin on skin  MRSA nares +: bactroban oint to nares top q12 for 5 days (6/7-6/12)  sponge bath  c/w neurontin 100mg po q12  c/w Duloxetine 30mg 3x/day  d/c HCT top cream - not good to use on NON-intact skin, like excoriations  can tx pain as needed    # HFrEF 20%; biventricular dysfxn; dilated CM; mod MR; mod-sev TR; HTN; CAD; MI; s/p CABG; s/p stent; PAD s/p stent and rt AKA;   no GDMT since BP low (pt allergic to ACEI and Entresto)  HOLD toprol xl 25mg q24  diuretics (torsemide and aldactone) on hold  cardio eval: Dr Nelson  CHF f/u: Dr Fernandez  started on diuretics every other days as per cards   currently off tele  c/w DAPT: asa + prasugrel (pt allergic to plavix)  daily wts  on RA    # UTI 2/2 mult strains ESBL Kleb  ID noted  abx: laquita 1gm iv q12 til 6/14  BCx neg  follow ID     # CKD 3; MOISES; anemia of chr dz (plus iron def anemia)  base Cr low 1s  peak CR 3.6  stable    # micro anemia  iron sat 3%; ferr 56  s/p venofer x5days  will use oral iron as outpt  will need OUTPT GI eval once stable as outpt for awhile    # DM T2 w/ neuropathy  diabetic DASH diet  FS qac/hs - goal 100-180  c/w lantu2 12 HS (on up to 44 units at home)  SSI - might need lisp w/ meals    # hypothyroid; anit TPO Ab +  synthroid 25 mcg q24 (50 on Sn)  TSH 5.6 in 1/2023  outpt f/u    # DLD  on crestor at home  pt allergic to lipitor  statins on hold  outpt f/u lipid panel  LFTs were prob abnl from passive congestion - LFTs better    # Rheum Arth  c/w MTX 10mg po q wed + folate 1mg po q24  Holding Pred 5mg po q24    # bowel reg cont  PEG q12 + senna 2 HS    # TIAGO  CPAP not in use 2/2 claustrophobia    # BPH  might need flomax 0.4mg po qhs back soon (usually on twice a day at home)    # anxiety  on valium 1-2mg at home prn - hold off for now 2/2 delirium    # Activity: family can bring in anti-flex contracture device; PT eval; OOBTC as bethany  # DVT ppx: Hep sc q12  # GI ppx: change PPI to 40mg po q24 (not IV)  # Code Status: Full     Dispo: Bladder scan, HF f/u tomorrow; IV abx tx till 6/14, pt will go to SNF for STR vs home - f/u CM and PT

## 2023-06-12 NOTE — SWALLOW VFSS/MBS ASSESSMENT ADULT - UNSUCCESSFUL STRATEGIES TRIALED DURING PROCEDURE
head turn to the right/head turn to the left head turn to the right/nonproductive volitional cough following clinician cue

## 2023-06-12 NOTE — PROGRESS NOTE ADULT - SUBJECTIVE AND OBJECTIVE BOX
LOIDAFLOWER KLINE  67y  Male  ***My note supersedes ALL resident notes that I sign.  My corrections for their notes are in my note.***    INTERVAL EVENTS: Here for f/u of CHF. modified barium swallow today. discussed in length with spouse present at bedside. as per spouse still not back to baseline.   Palliative following. patient is full code.     Vital Signs Last 24 Hrs  T(C): 37 (12 Jun 2023 04:22), Max: 37 (12 Jun 2023 04:22)  T(F): 98.6 (12 Jun 2023 04:22), Max: 98.6 (12 Jun 2023 04:22)  HR: 104 (12 Jun 2023 08:04) (101 - 108)  BP: 98/62 (12 Jun 2023 04:22) (98/62 - 108/72)  BP(mean): --  RR: 18 (12 Jun 2023 04:22) (18 - 18)  SpO2: 95% (12 Jun 2023 08:04) (84% - 100%)    Parameters below as of 12 Jun 2023 08:04  Patient On (Oxygen Delivery Method): room air        06-10-23 @ 07:01  -  06-11-23 @ 07:00  --------------------------------------------------------  IN: 0 mL / OUT: 600 mL / NET: -600 mL    06-11-23 @ 07:01 - 06-11-23 @ 12:19  --------------------------------------------------------  IN: 0 mL / OUT: 200 mL / NET: -200 mL    Gen: NAD;   skin: numerous excoriations on arms/hands and left leg (pt tends to pick at skin)  HEENT: PERRL, mouth clr, nose clr  Neck: no nodes, no JVD, thyroid nl  chest: lt AICD  lungs: clr  hrt: s1 s2 reg; tachy; 2/6 sys murmur apex  abd: soft, NT/ND, no HS megaly  ext: no edema, no c/c  rt AKA - stump OK  lt leg w/ signif flexion contracture (prevents standing); has small ulcers on dorsal base of 1st toe; and small ulcers on tips of toes 2 and 4  neuro: aa, confused (not at baseline) ox1, cn grossly intact, can move arms fine; rt aka; lt leg w/ flex contract    LABS:                      11.0    (    78.2   8.20  )-----------( ---------      151      ( 10 Nael 2023 12:18 )             39.1    (    33.3     CAPILLARY BLOOD GLUCOSE  POCT Blood Glucose.: 143 (06-11-23 @ 11:55)  POCT Blood Glucose.: 138 (06-11-23 @ 07:49)  POCT Blood Glucose.: 115 (06-10-23 @ 21:06)  POCT Blood Glucose.: 146 (06-10-23 @ 16:50)  POCT Blood Glucose.: 149 (06-10-23 @ 11:34)  POCT Blood Glucose.: 187 (06-10-23 @ 07:43)  POCT Blood Glucose.: 224 (06-09-23 @ 21:18)  POCT Blood Glucose.: 230 (06-09-23 @ 17:16)    Culture - Blood (collected 06-06-23 @ 11:46)  Source: .Blood None  Preliminary Report (06-07-23 @ 22:01):    No growth to date.    Culture - Urine (collected 06-05-23 @ 12:15)  Source: Catheterized Catheterized  Final Report (06-08-23 @ 10:34):    >100,000 CFU/ml Klebsiella pneumoniae ESBL Multiple Morphological Strains  Organism: Klebsiella pneumoniae ESBL  Klebsiella pneumoniae ESBL (06-08-23 @ 10:34)  Organism: Klebsiella pneumoniae ESBL (06-08-23 @ 10:34)      Method Type: YOLIE      -  Amikacin: S <=16      -  Amoxicillin/Clavulanic Acid: S <=8/4 Consider reserving for cystitis when ampicillin/sulbactam is resistant      -  Ampicillin: R >16 These ampicillin results predict results for amoxicillin      -  Ampicillin/Sulbactam: R >16/8 Enterobacter, Klebsiella aerogenes, Citrobacter, and Serratia may develop resistance during prolonged therapy (3-4 days)      -  Aztreonam: R >16      -  Cefazolin: R >16 For uncomplicated UTI with K. pneumoniae, E. coli, or P. mirablis: YOLIE <=16 is sensitive and YOLIE >=32 is resistant. This also predicts results for oral agents cefaclor, cefdinir, cefpodoxime, cefprozil, cefuroxime axetil, cephalexin and locarbef for uncomplicated UTI. Note that some isolates may be susceptible to these agents while testing resistant to cefazolin.      -  Cefepime: R >16      -  Ceftriaxone: R >32 Enterobacter, Klebsiella aerogenes, Citrobacter, and Serratia may develop resistance during prolonged therapy      -  Cefuroxime: R >16      -  Ciprofloxacin: R >2      -  Ertapenem: S <=0.5      -  Gentamicin: R >8      -  Imipenem: S <=1      -  Levofloxacin: R 4      -  Meropenem: S <=1      -  Nitrofurantoin: S <=32 Should not be used to treat pyelonephritis      -  Piperacillin/Tazobactam: S <=8      -  Tobramycin: I 8      -  Trimethoprim/Sulfamethoxazole: R >2/38  Organism: Klebsiella pneumoniae ESBL (06-08-23 @ 10:34)      Method Type: YOLIE      -  Amikacin: S <=16      -  Amoxicillin/Clavulanic Acid: S <=8/4 Consider reserving for cystitis when ampicillin/sulbactam is resistant      -  Ampicillin: R >16 These ampicillin results predict results for amoxicillin      -  Ampicillin/Sulbactam: R >16/8 Enterobacter, Klebsiella aerogenes, Citrobacter, and Serratia may develop resistance during prolonged therapy (3-4 days)      -  Aztreonam: R >16      -  Cefazolin: R >16 For uncomplicated UTI with K. pneumoniae, E. coli, or P. mirablis: YOLIE <=16 is sensitive and YOILE >=32 is resistant. This also predicts results for oral agents cefaclor, cefdinir, cefpodoxime, cefprozil, cefuroxime axetil, cephalexin and locarbef for uncomplicated UTI. Note that some isolates may be susceptible to these agents while testing resistant to cefazolin.      -  Cefepime: R 16      -  Ceftriaxone: R >32 Enterobacter, Klebsiella aerogenes, Citrobacter, and Serratia may develop resistance during prolonged therapy      -  Cefuroxime: R >16      -  Ciprofloxacin: R >2      -  Ertapenem: S <=0.5      -  Gentamicin: R >8      -  Imipenem: S <=1      -  Levofloxacin: R 2      -  Meropenem: S <=1      -  Nitrofurantoin: S <=32 Should not be used to treat pyelonephritis      -  Piperacillin/Tazobactam: S <=8      -  Tobramycin: S 4      -  Trimethoprim/Sulfamethoxazole: R >2/38    RADIOLOGY & ADDITIONAL TESTS:    MEDICATIONS:  meropenem  IVPB 1000 milliGRAM(s) IV Intermittent every 12 hours    acetaminophen     Tablet .. 325 milliGRAM(s) Oral every 4 hours PRN  aspirin enteric coated 81 milliGRAM(s) Oral daily  bacitracin   Ointment 1 Application(s) Topical every 12 hours  chlorhexidine 2% Cloths 1 Application(s) Topical daily  DULoxetine 30 milliGRAM(s) Oral <User Schedule>  folic acid 1 milliGRAM(s) Oral <User Schedule>  gabapentin 100 milliGRAM(s) Oral every 12 hours  heparin   Injectable 5000 Unit(s) SubCutaneous every 12 hours  insulin glargine Injectable (LANTUS) 12 Unit(s) SubCutaneous every morning  insulin lispro (ADMELOG) corrective regimen sliding scale   SubCutaneous three times a day before meals  levothyroxine 25 MICROGram(s) Oral <User Schedule>  levothyroxine 50 MICROGram(s) Oral <User Schedule>  mupirocin 2% Ointment 1 Application(s) Both Nostrils two times a day  pantoprazole    Tablet 40 milliGRAM(s) Oral before breakfast  polyethylene glycol 3350 17 Gram(s) Oral two times a day  prasugrel 10 milliGRAM(s) Oral daily  senna 2 Tablet(s) Oral at bedtime

## 2023-06-12 NOTE — SWALLOW VFSS/MBS ASSESSMENT ADULT - ADDITIONAL RECOMMENDATIONS
Pt remains at risk for aspiration and should be monitored closely for weight loss, malnutrition, recurrent aspiration PNAs, etc.

## 2023-06-12 NOTE — SWALLOW VFSS/MBS ASSESSMENT ADULT - SLP PERTINENT HISTORY OF CURRENT PROBLEM
Pt is a 68 y/o M w/ PMHx: HFrEF s/p ICD, decreased RV function, mild MR/TR, DM type I w/ neuropathy, hypoglycemia, DLD, HTN, CAD, MI, s/p CABG, s/p stent (2018), hx in-stent thrombosis while on Plavix, PAD s/p 3 LLE stents, TIAGO (does not use CPAP machine), psoriasis, R-AKA, presenting to ED s/p fall. Trauma w/u (-). Pt is being treated for moderate to large b/l pleural effusion and moderate abdominopelvic ascites, managed for acute on chronic HF exacerbation. Course c/b AMS, intubated for airway protection 6/5 and has been monitored in CCU for mixed cardiogenic/septic shock, AHRF, and ESBL Klebsiella UTI. Pt s/p extubation 6/8. CT cervical spine findings significant for multilevel anterior bridging osteophytes compatible with DISH, and ossification of the posterior longitudinal ligamentous from C4 through C6 contributing to spinal stenosis.

## 2023-06-12 NOTE — SWALLOW VFSS/MBS ASSESSMENT ADULT - ORAL PHASE
Residue in oral cavity/Incomplete tongue to palate contact/Uncontrolled bolus / spillover in ignacia-pharynx Reduced anterior - posterior transport/Residue in oral cavity/Incomplete tongue to palate contact/Uncontrolled bolus / spillover in ignacia-pharynx/Uncontrolled bolus / spillover in hypopharynx

## 2023-06-12 NOTE — SWALLOW VFSS/MBS ASSESSMENT ADULT - COMMENTS
Pt downgraded to medical floor. Palliative care following, family wants continued aggressive medical management. Nutrition following pt as well.

## 2023-06-12 NOTE — PROGRESS NOTE ADULT - SUBJECTIVE AND OBJECTIVE BOX
SUBJECTIVE:    Patient is a 67y old Male who presents with a chief complaint of Mechanical fall (12 Jun 2023 11:58)    Currently admitted to medicine with the primary diagnosis of Acute HF (heart failure)       Today is hospital day 25d. This morning he is resting comfortably in bed and reports no new issues or overnight events.     PAST MEDICAL & SURGICAL HISTORY  Status post percutaneous transluminal coronary angioplasty  2 stents    Atherosclerosis of coronary artery  CAD (coronary artery disease)    Osteoarthritis    HLD (hyperlipidemia)    Diabetes  on insulin pump    CHF (congestive heart failure)  EF ~ 25%    History of celiac disease    Diverticulitis    STEMI (ST elevation myocardial infarction)    Diabetic neuropathy  Hands and Feet    Anxiety and depression    Other postprocedural status  Fixation hardware in foot LEFT    Stented coronary artery  10/18 heart attack  INFERIOR WALL MI    S/P CABG x 1  2018    S/P TKR (total knee replacement), right  with infection Mrsa   per pt he was cleared from MRSA infection    Surgery, elective  right knee wound debridement    History of open reduction and internal fixation (ORIF) procedure  right hip    H/O shoulder surgery  right    AICD (automatic cardioverter/defibrillator) present  St Kali    Cholecystostomy care  drain inserted 2020 & removed 4 months ago    History of tonsillectomy    History of hip replacement, total, right    Elective surgery  plastic surgery Left shin      SOCIAL HISTORY:  Negative for smoking/alcohol/drug use.     ALLERGIES:  ACE inhibitors (Rash)  Entresto (Swelling)  Atorvastatin Calcium (Unknown)  Bactrim (Unknown)  Plavix (Unknown)    MEDICATIONS:  STANDING MEDICATIONS  aspirin enteric coated 81 milliGRAM(s) Oral daily  bacitracin   Ointment 1 Application(s) Topical every 12 hours  chlorhexidine 2% Cloths 1 Application(s) Topical daily  dextrose 5%. 1000 milliLiter(s) IV Continuous <Continuous>  dextrose 5%. 1000 milliLiter(s) IV Continuous <Continuous>  dextrose 50% Injectable 25 Gram(s) IV Push once  dextrose 50% Injectable 12.5 Gram(s) IV Push once  dextrose 50% Injectable 25 Gram(s) IV Push once  DULoxetine 30 milliGRAM(s) Oral <User Schedule>  folic acid 1 milliGRAM(s) Oral <User Schedule>  gabapentin 100 milliGRAM(s) Oral every 12 hours  glucagon  Injectable 1 milliGRAM(s) IntraMuscular once  heparin   Injectable 5000 Unit(s) SubCutaneous every 12 hours  insulin glargine Injectable (LANTUS) 12 Unit(s) SubCutaneous every morning  insulin lispro (ADMELOG) corrective regimen sliding scale   SubCutaneous three times a day before meals  levothyroxine 25 MICROGram(s) Oral <User Schedule>  levothyroxine 50 MICROGram(s) Oral <User Schedule>  meropenem  IVPB 1000 milliGRAM(s) IV Intermittent every 12 hours  multivitamin/minerals 1 Tablet(s) Oral daily  pantoprazole    Tablet 40 milliGRAM(s) Oral before breakfast  polyethylene glycol 3350 17 Gram(s) Oral two times a day  prasugrel 10 milliGRAM(s) Oral daily  senna 2 Tablet(s) Oral at bedtime  torsemide 20 milliGRAM(s) Oral <User Schedule>    PRN MEDICATIONS  acetaminophen     Tablet .. 325 milliGRAM(s) Oral every 4 hours PRN  dextrose Oral Gel 15 Gram(s) Oral once PRN    VITALS:   T(F): 97.9  HR: 101  BP: 100/57  RR: 18  SpO2: 95%      PHYSICAL EXAM:  GEN: No acute distress  LUNGS: reduced breath sounds Lt.>Rt.  HEART: S1/S2 present. RRR.   ABD: Soft, non-tender, non-distended. Bowel sounds present  EXT: Rt. AKA, B/L UE and LLE excoriations  NEURO: AAOX1, confused

## 2023-06-13 NOTE — PROGRESS NOTE ADULT - SUBJECTIVE AND OBJECTIVE BOX
Date of Admission: 23    Interval History: Patient found sitting up in bed, spouse present at bedside. Awake and alert, currently confused/forgetful. Remains off pressors and inotropic support. Volume status improved, kidney function stable.     CHIEF COMPLAINT: Patient is a 67y old  Male who presents with a chief complaint of Mechanical fall (22 May 2023 12:56)    HISTORY OF PRESENT ILLNESS: 67-year-old male w/ HFrEF 15-20% s/p ICD, decreased RV function, mild MR/TR, DM type I w/ neuropathy and hx hypoglycemia , DL, HTN, CAD, hx MI, s/p CABG, s/p stent (), hx in-stent thrombosis while on Plavix, PAD s/p 3 LLE stents, TIAGO (needs CPAP auto-regulating pressure 10-, patient doesn't use it due to claustrophobia), Psoriasis, R-AKA presenting to ED s/p fall. He states he was lying in bed at home when he fell asleep and fell off the bed, and complains of generalized headache neck pain, non-radiating, no alleviating or aggravating factors, associated with right anterior chest wall pain. Denies LOC,  fevers, chills, nausea, vomiting, dizziness, abdominal pain, shortness of breath. States he had TDAP recently. Pt was not down for long as wife was upstairs when he fell off the bed and called EMS right away.   Labs significant for cr 1.8 (baseline ~ 1.2)  trop 0.10 (18 May 2023 14:16)      PAST MEDICAL & SURGICAL HISTORY:  Status post percutaneous transluminal coronary angioplasty  2 stents  Atherosclerosis of coronary artery  CAD (coronary artery disease)  Osteoarthritis  HLD (hyperlipidemia)  Diabetes  on insulin pump  CHF (congestive heart failure)  EF ~ 25%  History of celiac disease  Diverticulitis  STEMI (ST elevation myocardial infarction)  Diabetic neuropathy  Hands and Feet  Anxiety and depression  Other postprocedural status  Fixation hardware in foot LEFT  Stented coronary artery  10/18 heart attack  INFERIOR WALL MI  S/P CABG x 1    S/P TKR (total knee replacement), right  with infection Mrsa  per pt he was cleared from MRSA infection  Surgery, elective  right knee wound debridement  History of open reduction and internal fixation (ORIF) procedure  right hip  H/O shoulder surgery  right  AICD (automatic cardioverter/defibrillator) present  St Kali  Cholecystostomy care  drain inserted  & removed 4 months ago  History of tonsillectomy  History of hip replacement, total, right  Elective surgery  plastic surgery Left shin      FAMILY HISTORY: Patient was adopted, family history unknown to him  SOCIAL HISTORY:  Denies smoking, alcohol, or drug use      Allergies  ACE inhibitors (Rash)  Entresto (Swelling)  Atorvastatin Calcium (Unknown)  Bactrim (Unknown)  Plavix (Unknown)    Intolerances      PHYSICAL EXAM:  ICU Vital Signs Last 24 Hrs  T(C): 36 (2023 13:47), Max: 36 (2023 13:47)  T(F): 96.8 (2023 13:47), Max: 96.8 (2023 13:47)  HR: 103 (2023 13:47) (94 - 105)  BP: 98/63 (2023 13:47) (87/53 - 110/68)  BP(mean): 69 (2023 12:00) (69 - 69)  RR: 20 (2023 13:47) (18 - 20)  SpO2: 95% (2023 11:00) (95% - 96%)    O2 Parameters below as of 2023 11:00  Patient On (Oxygen Delivery Method): room air      General Appearance: awake, confused   Cardiovascular: regular rate and rhythm S1 S2, No edema  Respiratory: decreased b/l  Psychiatry: awake, alert and oriented x1-2   Gastrointestinal:  Soft, +BS  Skin/Integumentary: No rashes, No ecchymoses, No cyanosis	  Musculoskeletal/ extremities: R AKA, No clubbing, cyanosis or edema  Vascular: Peripheral pulses palpable 2+ B/L UE         LABS:	 	    CBC Full  -  ( 2023 11:08 )  WBC Count : 7.88 K/uL  RBC Count : 4.74 M/uL  Hemoglobin : 10.7 g/dL  Hematocrit : 36.9 %  Platelet Count - Automated : 235 K/uL  Mean Cell Volume : 77.8 fL  Mean Cell Hemoglobin : 22.6 pg  Mean Cell Hemoglobin Concentration : 29.0 g/dL  Auto Neutrophil # : 5.95 K/uL  Auto Lymphocyte # : 0.83 K/uL  Auto Monocyte # : 0.88 K/uL  Auto Eosinophil # : 0.15 K/uL  Auto Basophil # : 0.03 K/uL  Auto Neutrophil % : 75.5 %  Auto Lymphocyte % : 10.5 %  Auto Monocyte % : 11.2 %  Auto Eosinophil % : 1.9 %  Auto Basophil % : 0.4 %    Comprehensive Metabolic Panel (23 @ 11:08)    Sodium, Serum: 142 mmol/L   Potassium, Serum: 4.5 mmol/L   Chloride, Serum: 104 mmol/L   Carbon Dioxide, Serum: 28 mmol/L   Anion Gap, Serum: 10 mmol/L   Blood Urea Nitrogen, Serum: 26 mg/dL   Creatinine, Serum: 1.1 mg/dL   Glucose, Serum: 174 mg/dL   Calcium, Total Serum: 8.5 mg/dL   Protein Total, Serum: 5.9 g/dL   Albumin, Serum: 3.1 g/dL   Bilirubin Total, Serum: 1.6 mg/dL   Alkaline Phosphatase, Serum: 93 U/L   Aspartate Aminotransferase (AST/SGOT): 21 U/L   Alanine Aminotransferase (ALT/SGPT): 21 U/L   eGFR: 74: The estimated glomerular filtration rate (eGFR) is calculated using the   CKD-EPI creatinine equation, which does not have a coefficient for  race and is validated in individuals 18 years of age and older (N Engl J  Med ; [id number]). Creatinine-based eGFR may be inaccurate in  various situations including but not limited to extremes of muscle mass,  altered dietary protein intake, or medications that affect renal tubular  creatinine secretion. mL/min/1.73m2      CARDIAC MARKERS:  Pro-Brain Natriuretic Peptide: 9132 pg/mL (23 @ 07:01)        TELEMETRY EVENTS: 	    EC23    Ventricular Rate 79 BPM  Atrial Rate 79 BPM  P-R Interval 170 ms  QRS Duration 158 ms  Q-T Interval 480 ms  QTC Calculation(Bazett) 550 ms  P Axis 51 degrees  R Axis -35 degrees  T Axis 65 degrees    Diagnosis Line Atrial-sensed ventricular-paced rhythm  Abnormal ECG    Confirmed by London Santos ([id number]) on 2023 4:12:01 PM      PREVIOUS DIAGNOSTIC TESTING:    TTE 23    Summary:   1. Left ventricular ejection fraction, by visual estimation, is <20%.   2. Severely decreased global left ventricular systolic function.   3. Mildly increased LV wall thickness.   4. Severe concentric left ventricular hypertrophy.   5. Spectral Doppler shows restrictive pattern of left ventricular   myocardial filling (Grade III diastolic dysfunction).   6. Moderately enlarged right ventricle.   7. Moderately reduced RV systolic function.   8. Elevated mean left atrial pressure.   9. Moderately enlarged left atrium.  10.Moderate mitral valve regurgitation.  11. The mitral valve leaflets are tethered which is due to reduced   systolic function and elevated LVDP.  12. Moderate-severe tricuspid regurgitation.  13. Estimated pulmonary artery systolic pressure is 48.4 mmHg assuming a   right atrial pressure of 8 mmHg, which is consistent with mild pulmonary   hypertension.  14. Patient is in normal sinus rhythm.  15. Compared to the prior TTE dated 21, the patient's cardiomyopathy   has worsened.      Left Heart Catheterization: 18    IMPRESSIONS: There is significant single vessel native coronary artery  disease. Patent LIMA to LAD. No significant restenosis in RCA/OM1.    	    Home Medications:  aspirin 81 mg oral delayed release tablet: 1 tab(s) orally once a day (2023 02:35)  diazePAM 2 mg oral tablet: 0.5 tab(s) orally once a day (at bedtime) (2023 02:35)  DULoxetine 30 mg oral delayed release capsule: 1 cap(s) orally 3 times a day (2023 02:35)  insulin glargine 100 units/mL subcutaneous solution: 25 unit(s) subcutaneous once a day (at bedtime) (2023 02:35)  insulin glargine 100 units/mL subcutaneous solution: 40 microcurie subcutaneous once a day (2023 02:35)  insulin lispro 100 units/mL injectable solution: if your finger stick is 150-199 please inject 2 units  if your finger stick is 200-250 please inject 4 units  if your finger stick is 251-300 please inject 6 units  if your finger stick is 301-350 please inject 8 units  if your finger stick is more than 350 please call your physician    (2023 02:35)  levothyroxine 25 mcg (0.025 mg) oral tablet: 1 tab(s) orally 6 times a week (2023 02:35)  levothyroxine 50 mcg (0.05 mg) oral tablet: 1 tab(s) orally once a week (2023 02:35)  metoprolol succinate 25 mg oral tablet, extended release: 1 tab(s) orally once a day (2023 02:35)  Multiple Vitamins oral tablet: 1 tab(s) orally once a day (2023 02:35)  pantoprazole 40 mg oral delayed release tablet: 1 tab(s) orally once a day (before a meal) (2023 02:35)  prasugrel 10 mg oral tablet: 1 tab(s) orally once a day (2023 02:35)  rosuvastatin 40 mg oral tablet: 1 tab(s) orally once a day (2023 02:35)  spironolactone 25 mg oral tablet: 1 tab(s) orally once a day (2023 02:35)        MEDICATIONS  (STANDING):  aspirin enteric coated 81 milliGRAM(s) Oral daily  dextrose 5% + sodium chloride 0.9%. 1000 milliLiter(s) (50 mL/Hr) IV Continuous <Continuous>  dextrose 5%. 1000 milliLiter(s) (100 mL/Hr) IV Continuous <Continuous>  dextrose 5%. 1000 milliLiter(s) (50 mL/Hr) IV Continuous <Continuous>  dextrose 50% Injectable 25 Gram(s) IV Push once  dextrose 50% Injectable 12.5 Gram(s) IV Push once  dextrose 50% Injectable 25 Gram(s) IV Push once  diazepam    Tablet 1 milliGRAM(s) Oral at bedtime  DULoxetine 30 milliGRAM(s) Oral <User Schedule>  furosemide   Injectable 20 milliGRAM(s) IV Push two times a day  glucagon  Injectable 1 milliGRAM(s) IntraMuscular once  heparin   Injectable 5000 Unit(s) SubCutaneous every 12 hours  hydrocortisone 2.5% Cream 1 Application(s) Topical two times a day  insulin glargine Injectable (LANTUS) 12 Unit(s) SubCutaneous at bedtime  insulin lispro (ADMELOG) corrective regimen sliding scale   SubCutaneous three times a day before meals  lactulose Syrup 10 Gram(s) Oral at bedtime  levothyroxine 25 MICROGram(s) Oral <User Schedule>  levothyroxine 50 MICROGram(s) Oral <User Schedule>  metoprolol succinate ER 25 milliGRAM(s) Oral daily  midodrine. 10 milliGRAM(s) Oral three times a day  pantoprazole    Tablet 40 milliGRAM(s) Oral before breakfast  polyethylene glycol 3350 17 Gram(s) Oral two times a day  prasugrel 10 milliGRAM(s) Oral daily  senna 2 Tablet(s) Oral at bedtime      MEDICATIONS  (PRN):  dextrose Oral Gel 15 Gram(s) Oral once PRN Blood Glucose LESS THAN 70 milliGRAM(s)/deciliter

## 2023-06-13 NOTE — CONSULT NOTE ADULT - CONSULT REQUESTED DATE/TIME
19-May-2023 14:06
20-May-2023 14:20
22-May-2023 12:28
05-Jun-2023 15:51
13-Jun-2023 14:47
05-Jun-2023 15:04
22-May-2023

## 2023-06-13 NOTE — PROGRESS NOTE ADULT - ASSESSMENT
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 He states he was lying in bed at home when he fell asleep and fell off the bed. Found to have moises and mild elevation of trop     # downgrade from CCU; intub 6/5; extub 6/8; was in cardiogenic and septic shocks  STAR PATIENT    # baseline fxn per family: pt has severe lt leg flexion contracture, which prohibits standing and ability to get prosthetic for rt leg; pt needs asst, but can help w/ transfers to WC or car; pt has not been in shower in years, just sponge baths; MS is usually "normal" (perhaps rare, slight forgetfulness); can feed self; needs asst w/ dressing; cannot do chores; family cares for him; pt, with asst, can get out of house - has ramps, uses slide board to get in and out of car, uses WC to get around    # hospital delirum; poss mild vasc-induced cog impairment  pt was intubated for failure to protect own airway (not really resp failure)  pt is NOT usually hypercapnic (see ABGs)  CTH noted: chr microvasc changes  EEG -ve  can use melatonin for sleep  not req anti-psych  Sp/Sw (spoke w/ Nesha): puree + mild thick liquids s/p modified barium swallow as per speech continue puree, mildly thick liquids  pt pulled out NGT    # sev malnutrition; hypernatremia; hx of DISH causing dysphagia (per SP/Sw)  add ensure 3x/day  MVI w/ min q24  dietician eval  c/w oral hydration up to 1.5 L /day    # skin excoriations; periph neuropathy 2/2 DM and vasc  can use bacitracin on skin  MRSA nares +: bactroban oint to nares top q12 for 5 days (6/7-6/12)  sponge bath  c/w neurontin 100mg po q12  c/w Duloxetine 30mg 3x/day  d/c HCT top cream - not good to use on NON-intact skin, like excoriations  can tx pain as needed    # HFrEF 20%; biventricular dysfxn; dilated CM; mod MR; mod-sev TR; HTN; CAD; MI; s/p CABG; s/p stent; PAD s/p stent and rt AKA;   no GDMT since BP low (pt allergic to ACEI and Entresto)  HOLD toprol xl 25mg q24  on lasix 40 BID iv (keep an eye on BP)  cardio eval: Dr Nelson  CHF f/u: Dr Fernandez  started on diuretics every other days as per cards. CXR showed worsening HF   currently off tele  c/w DAPT: asa + prasugrel (pt allergic to plavix)  daily wts  on RA  follow up with cardiology     # UTI 2/2 mult strains ESBL Kleb  ID noted  abx: laquita 1gm iv q12 til 6/14  BCx neg  follow ID     # CKD 3; MOISES; anemia of chr dz (plus iron def anemia)  base Cr low 1s  peak CR 3.6  stable, monitor     # micro anemia  iron sat 3%; ferr 56  s/p venofer x5days  will use oral iron as outpt  will need OUTPT GI eval once stable as outpt for awhile    # DM T2 w/ neuropathy  diabetic DASH diet  FS qac/hs - goal 100-180  c/w lantu2 12 HS (on up to 44 units at home)  SSI - might need lisp w/ meals    # hypothyroid; anit TPO Ab +  synthroid 25 mcg q24 (50 on Sn)  TSH 5.6 in 1/2023  outpt f/u    # DLD  on crestor at home  pt allergic to lipitor  statins on hold  outpt f/u lipid panel  LFTs were prob abnl from passive congestion - LFTs better    # Rheum Arth  c/w MTX 10mg po q wed + folate 1mg po q24  Holding Pred 5mg po q24    # bowel reg cont  PEG q12 + senna 2 HS    # TIAGO  CPAP not in use 2/2 claustrophobia    # BPH  might need flomax 0.4mg po qhs back soon (usually on twice a day at home)    # anxiety  on valium 1-2mg at home prn - hold off for now 2/2 delirium    # activity: family can bring in anti-flex contracture device; PT eval; OOBTC as bethany    # DVT ppx: Hep sc q12    # GI ppx: change PPI to 40mg po q24 (not IV)    # Code Status: Full     Dispo:  CHF f/u; started on IV lasix, (may need to cut back tomorrow based on clinical picture, monitor BP, serum creatinine,  IV abx; tx DM; watch BP; tx skin; bactroban to 6/12; add ensure  eventually, pt will go to SNF for STR vs home - f/u CM and PT - still acute  follow daily labs   abx till 6/14     Prog is guarded.    Patients is high risk of clinical deterioration and demise. family aware.

## 2023-06-13 NOTE — CONSULT NOTE ADULT - REASON FOR ADMISSION
Mechanical fall

## 2023-06-13 NOTE — PROGRESS NOTE ADULT - ASSESSMENT
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 He states he was lying in bed at home when he fell asleep and fell off the bed. Found to have moises and mild elevation of trop     # Downgrade from CCU; intub 6/5; extub 6/8; was in cardiogenic and septic shocks-resolved now    # Baseline fxn per family: pt has severe lt leg flexion contracture, which prohibits standing and ability to get prosthetic for rt leg; pt needs asst, but can help w/ transfers to WC or car; pt has not been in shower in years, just sponge baths; MS is usually "normal" (perhaps rare, slight forgetfulness); can feed self; needs asst w/ dressing; cannot do chores; family cares for him; pt, with asst, can get out of house - has ramps, uses slide board to get in and out of car, uses WC to get around    # Hospital delirum; poss mild vasc-induced cog impairment  pt was intubated for failure to protect own airway (not really resp failure)  pt is NOT usually hypercapnic (see ABGs)  CTH noted: chr microvasc changes  EEG -ve  can use melatonin for sleep  pt pulled out NGT  not req anti-psych  Sp/Sw (spoke w/ Nesha): puree + mild thick liquids  s/p Mod. barium swallow today- c/w current diet, pt. at high risk of aspiration, needs multiple swallows, monitor for weight loss and malnutrition, aspiration precautions    # Severe malnutrition; hypernatremia; hx of DISH causing dysphagia (per SP/Sw)  add ensure 3x/day  MVI w/ min q24  dietician eval  c/w oral hydration up to 1.5 L /day    # Skin excoriations; periph neuropathy 2/2 DM and vasc  can use bacitracin on skin  MRSA nares +: bactroban oint to nares top q12 for 5 days (6/7-6/12)  sponge bath  c/w neurontin 100mg po q12  c/w Duloxetine 30mg 3x/day  d/c HCT top cream - not good to use on NON-intact skin, like excoriations  can tx pain as needed    # HFrEF 20%; biventricular dysfxn; dilated CM; mod MR; mod-sev TR; HTN; CAD; MI; s/p CABG; s/p stent; PAD s/p stent and rt AKA;   no GDMT since BP low (pt allergic to ACEI and Entresto)  HOLD toprol xl 25mg q24  cardio eval: Dr Nelson  currently off tele  c/w DAPT: asa + prasugrel (pt allergic to plavix)  daily wts, Is and Os  on RA  - 6/13: CXR today worsening congestion, IVC 2cm, <50% collapsible, lactate normal  - HF following- will give Lasix 40mg BID today and will switch to Torsemide 20mg qd tomorrow  - started on Hydralazine 10mg TID    # UTI 2/2 mult strains ESBL Kleb  #Urinary retention s/p fields placement on 6/13  ID noted  abx: laquita 1gm iv q12 till 6/14  BCx neg  Urology eval appreciated- pt. following Dr. connelly OP, pending UDS, recommended CIC, but pt. non-compliant to that    # CKD 3; MOISES; anemia of chr dz (plus iron def anemia)  base Cr low 1s  peak CR 3.6  stable    # micro anemia  iron sat 3%; ferr 56  s/p venofer x5days  will use oral iron as outpt  will need OUTPT GI eval once stable as outpt for awhile    # DM T2 w/ neuropathy  diabetic DASH diet  FS qac/hs - goal 100-180  c/w lantu2 12 HS (on up to 44 units at home)  SSI - might need lisp w/ meals    #Hypothyroid; anti TPO Ab +  synthroid 25 mcg q24 (50 on Sn)  TSH 5.6 in 1/2023  outpt f/u    # DLD  on crestor at home  pt allergic to lipitor  statins on hold  outpt f/u lipid panel in AM (Lipid profile in 2/2023 wnl)  LFTs were prob abnl from passive congestion - LFTs better    # Rheum Arth  c/w MTX 10mg po q wed + folate 1mg po q24  Holding Pred 5mg po q24    # bowel reg cont  PEG q12 + senna 2 HS    # TIAGO  CPAP not in use 2/2 claustrophobia    # BPH  - restarted Flomax 04 on 6/13 on BID at home may increase tomorrow    # Anxiety  on valium 1-2mg at home prn - hold off for now 2/2 delirium    # Activity: family can brought in anti-flex contracture device; PT eval; OOBTC as tolerated  # DVT ppx: Hep sc q12  # GI ppx: change PPI to 40mg po q24 (not IV)  # Code Status: Full     Dispo: Diuresis; urinary retention, IV abx tx till 6/14, pt will go to SNF for STR vs home - f/u CM and PT

## 2023-06-13 NOTE — PROGRESS NOTE ADULT - ASSESSMENT
Assessment: 67-year-old male w/ HFrEF 15-20% s/p ICD, DM type I, DL, HTN, CAD, hx MI, s/p CABG, s/p stent (2018), hx in-stent thrombosis while on Plavix, PAD s/p 3 LLE stents, TIAGO non-compliant w/ CPAP, R-AKA presenting to ED s/p fall (mechanical). Patient found to be fluid overloaded with MOISES, heart failure consulted for further management of ADHF. Hospital course c/b AMS, elevated lactate with worsening renal/liver function. Patient was intubated 6/05 for airway protection, started empirically on inotropic support with good urine response + improving lactate. Patient was transferred to CCU for SWAN catheter placement for closer hemodynamic monitoring. Of note, patient also found to have +UA w/puss noted in fields bag. UCx positive for klebsiella pneumoniae ESBL- treating with IV antibiotics. Patient extubated 6/08, [place] catheter removed. Patient status improved, downgraded to floor.       Plan:  Patient is mildly fluid overloaded on exam  POCUS exam: IVC 2cm, < 50% collapsibility with respirations  Give Furosemide 40mg IVP BID for today  Tomorrow 6/14- switch to torsemide 20mg daily  Start hydralazine 10mg TID, hold for systolic BP < 85 mmHg  PT follow-up / OOB to chair   Get BMP daily with magnesium   Maintain potassium >4.0, Mg >2.2  Strict intake and output  Daily weight    Plan discussed with patient, spouse at bedside, and primary team

## 2023-06-13 NOTE — CONSULT NOTE ADULT - SUBJECTIVE AND OBJECTIVE BOX
66 yo M PMHx chronic HFrEF 15-20% s/p ICD, decreased RV function, mild MR/TR, DM type I w/ neuropathy and hx hypoglycemia, DLD, HTN, CAD, hx MI, s/p CABG, s/p stent (2018), hx in-stent thrombosis while on Plavix, PAD s/p 3 LLE stents, TIAGO (needs CPAP auto-regulating pressure 10-16, patient doesn't use it due to claustrophobia), Psoriasis, R-AKA, BPH  presenting to ED s/p fall. He states he was lying in bed at home when he fell asleep and fell off the bed. Found to have MOISES and mild elevation of trop Downgrade from CCU; intub 6/5; extub 6/8; was in cardiogenic and septic shocks.   Urology called to evaluate Pt. for Urinary retention. Fields catheter was placed 20 minutes prior with 600 UO + pyuria at the end of drainage.  Pt. appears to be poor historian now , Pt's wife at bedside providing medical hx.   Pt. Is following with Dr. Garibay as an outpatient. As per wife Dr. Garibay- urologist, UDS " not fully completed" about one month ago, but even prior to that Dr. Garibay was recommending CIC at home-  Pt. was not compliant with CIC at home.               PAST MEDICAL & SURGICAL HISTORY:  Status post percutaneous transluminal coronary angioplasty  2 stents      Atherosclerosis of coronary artery  CAD (coronary artery disease)      Osteoarthritis      HLD (hyperlipidemia)      Diabetes  on insulin pump      CHF (congestive heart failure)  EF ~ 25%      History of celiac disease      Diverticulitis      STEMI (ST elevation myocardial infarction)      Diabetic neuropathy  Hands and Feet      Anxiety and depression      Other postprocedural status  Fixation hardware in foot LEFT      Stented coronary artery  10/18 heart attack  INFERIOR WALL MI      S/P CABG x 1  2018      S/P TKR (total knee replacement), right  with infection Mrsa   per pt he was cleared from MRSA infection      Surgery, elective  right knee wound debridement      History of open reduction and internal fixation (ORIF) procedure  right hip      H/O shoulder surgery  right      AICD (automatic cardioverter/defibrillator) present  [facility]      Cholecystostomy care  drain inserted 2020 & removed 4 months ago      History of tonsillectomy      History of hip replacement, total, right      Elective surgery  plastic surgery Left shin      MEDICATIONS  (STANDING):  aspirin enteric coated 81 milliGRAM(s) Oral daily  bacitracin   Ointment 1 Application(s) Topical every 12 hours  chlorhexidine 2% Cloths 1 Application(s) Topical daily  dextrose 5%. 1000 milliLiter(s) (100 mL/Hr) IV Continuous <Continuous>  dextrose 5%. 1000 milliLiter(s) (50 mL/Hr) IV Continuous <Continuous>  dextrose 50% Injectable 25 Gram(s) IV Push once  dextrose 50% Injectable 12.5 Gram(s) IV Push once  dextrose 50% Injectable 25 Gram(s) IV Push once  DULoxetine 30 milliGRAM(s) Oral <User Schedule>  folic acid 1 milliGRAM(s) Oral <User Schedule>  furosemide   Injectable 40 milliGRAM(s) IV Push once  gabapentin 100 milliGRAM(s) Oral every 12 hours  glucagon  Injectable 1 milliGRAM(s) IntraMuscular once  heparin   Injectable 5000 Unit(s) SubCutaneous every 12 hours  insulin glargine Injectable (LANTUS) 12 Unit(s) SubCutaneous every morning  insulin lispro (ADMELOG) corrective regimen sliding scale   SubCutaneous three times a day before meals  levothyroxine 25 MICROGram(s) Oral <User Schedule>  levothyroxine 50 MICROGram(s) Oral <User Schedule>  meropenem  IVPB 1000 milliGRAM(s) IV Intermittent every 12 hours  multivitamin/minerals 1 Tablet(s) Oral daily  pantoprazole    Tablet 40 milliGRAM(s) Oral before breakfast  polyethylene glycol 3350 17 Gram(s) Oral two times a day  prasugrel 10 milliGRAM(s) Oral daily  senna 2 Tablet(s) Oral at bedtime  tamsulosin 0.4 milliGRAM(s) Oral at bedtime    MEDICATIONS  (PRN):  acetaminophen     Tablet .. 325 milliGRAM(s) Oral every 4 hours PRN Moderate Pain (4 - 6)  dextrose Oral Gel 15 Gram(s) Oral once PRN Blood Glucose LESS THAN 70 milliGRAM(s)/deciliter      Allergies:   ACE inhibitors (Rash)  Entresto (Swelling)  Atorvastatin Calcium (Unknown)  Bactrim (Unknown)  Plavix (Unknown)      SOCIAL HISTORY: No illicit drug use    FAMILY HISTORY:  Family history of heart disease (Mother)    Family history of allergies  daughter        REVIEW OF SYSTEMS   [x] A ten-point review of systems was otherwise negative except as noted.     Vital Signs Last 24 Hrs  T(C): 36 (13 Jun 2023 13:47), Max: 36 (13 Jun 2023 13:47)  T(F): 96.8 (13 Jun 2023 13:47), Max: 96.8 (13 Jun 2023 13:47)  HR: 103 (13 Jun 2023 13:47) (94 - 105)  BP: 98/63 (13 Jun 2023 13:47) (87/53 - 110/68)  BP(mean): 69 (13 Jun 2023 12:00) (69 - 69)  RR: 20 (13 Jun 2023 13:47) (18 - 20)  SpO2: 95% (13 Jun 2023 11:00) (95% - 96%)    Parameters below as of 13 Jun 2023 11:00  Patient On (Oxygen Delivery Method): room air        PHYSICAL EXAM:    GEN: NAD, awake and alert, cachetic   SKIN:  non diaphoretic.  RESP: Non-labored breathing. No use of accessory muscles.  ABDO: Soft, NT/ND, no palpable bladder, no suprapubic tenderness   BACK: No CVAT B/L  : + Circumcised  male. B/L descended testicles x 2. No lesions or palpable masses noted B/L. No meatal discharge. + 16 Fr Indwelling fields in place, draining pyuria.   EXT: Right AKA     I&O's Detail    12 Jun 2023 07:01  -  13 Jun 2023 07:00  --------------------------------------------------------  IN:  Total IN: 0 mL    OUT:    Intermittent Catheterization - Urethral (mL): 400 mL  Total OUT: 400 mL    Total NET: -400 mL      13 Jun 2023 07:01  -  13 Jun 2023 15:22  --------------------------------------------------------  IN:  Total IN: 0 mL    OUT:    Indwelling Catheter - Urethral (mL): 350 mL    Voided (mL): 150 mL  Total OUT: 500 mL    Total NET: -500 mL          LABS:                        10.7   7.88  )-----------( 235      ( 13 Jun 2023 11:08 )             36.9     06-13    142  |  104  |  26<H>  ----------------------------<  174<H>  4.5   |  28  |  1.1    Ca    8.5      13 Jun 2023 11:08  Mg     1.8     06-13    TPro  5.9<L>  /  Alb  3.1<L>  /  TBili  1.6<H>  /  DBili  x   /  AST  21  /  ALT  21  /  AlkPhos  93  06-13      Urinalysis (06.05.23 @ 12:15)    pH Urine: 6.5   Glucose Qualitative, Urine: Negative   Blood, Urine: Large   Color: Yellow   Urine Appearance: Turbid   Bilirubin: Negative   Ketone - Urine: Negative   Specific Gravity: 1.020   Protein, Urine: 300 mg/dL   Urobilinogen: <2 mg/dL   Nitrite: Positive   Leukocyte Esterase Concentration: Large    Urine Microscopic-Add On (NC) (06.05.23 @ 12:15)    Red Blood Cell - Urine: 20 /HPF   White Blood Cell - Urine: >720 /HPF   Bacteria: Moderate   Squamous Epithelial Cells: 3 /HPF    Culture - Urine (06.05.23 @ 12:15)    -  Amikacin: S <=16   -  Amikacin: S <=16   -  Amoxicillin/Clavulanic Acid: S <=8/4 Consider reserving for cystitis when ampicillin/sulbactam is resistant   -  Amoxicillin/Clavulanic Acid: S <=8/4 Consider reserving for cystitis when ampicillin/sulbactam is resistant   -  Ampicillin: R >16 These ampicillin results predict results for amoxicillin   -  Ampicillin: R >16 These ampicillin results predict results for amoxicillin   -  Ampicillin/Sulbactam: R >16/8 Enterobacter, Klebsiella aerogenes, Citrobacter, and Serratia may develop resistance during prolonged therapy (3-4 days)   -  Ampicillin/Sulbactam: R >16/8 Enterobacter, Klebsiella aerogenes, Citrobacter, and Serratia may develop resistance during prolonged therapy (3-4 days)   -  Aztreonam: R >16   -  Aztreonam: R >16   -  Cefepime: R >16   -  Cefepime: R 16   -  Ciprofloxacin: R >2   -  Ciprofloxacin: R >2   -  Ertapenem: S <=0.5   -  Ertapenem: S <=0.5   -  Gentamicin: R >8   -  Gentamicin: R >8   -  Ceftriaxone: R >32 Enterobacter, Klebsiella aerogenes, Citrobacter, and Serratia may develop resistance during prolonged therapy   -  Ceftriaxone: R >32 Enterobacter, Klebsiella aerogenes, Citrobacter, and Serratia may develop resistance during prolonged therapy   -  Cefuroxime: R >16   -  Cefuroxime: R >16   -  Imipenem: S <=1   -  Imipenem: S <=1   -  Levofloxacin: R 2   -  Levofloxacin: R 4   -  Cefazolin: R >16 For uncomplicated UTI with K. pneumoniae, E. coli, or P. mirablis: YOLIE <=16 is sensitive and YOLIE >=32 is resistant. This also predicts results for oral agents cefaclor, cefdinir, cefpodoxime, cefprozil, cefuroxime axetil, cephalexin and locarbef for uncomplicated UTI. Note that some isolates may be susceptible to these agents while testing resistant to cefazolin.   -  Cefazolin: R >16 For uncomplicated UTI with K. pneumoniae, E. coli, or P. mirablis: YOLIE <=16 is sensitive and YOLIE >=32 is resistant. This also predicts results for oral agents cefaclor, cefdinir, cefpodoxime, cefprozil, cefuroxime axetil, cephalexin and locarbef for uncomplicated UTI. Note that some isolates may be susceptible to these agents while testing resistant to cefazolin.   -  Meropenem: S <=1   -  Meropenem: S <=1   -  Nitrofurantoin: S <=32 Should not be used to treat pyelonephritis   -  Nitrofurantoin: S <=32 Should not be used to treat pyelonephritis   -  Piperacillin/Tazobactam: S <=8   -  Piperacillin/Tazobactam: S <=8   -  Tobramycin: S 4   -  Tobramycin: I 8   -  Trimethoprim/Sulfamethoxazole: R >2/38   -  Trimethoprim/Sulfamethoxazole: R >2/38   Specimen Source: Catheterized Catheterized   Culture Results:   >100,000 CFU/ml Klebsiella pneumoniae ESBL Multiple Morphological Strains   Organism Identification: Klebsiella pneumoniae ESBL  Klebsiella pneumoniae ESBL   Organism: Klebsiella pneumoniae ESBL   Organism: Klebsiella pneumoniae ESBL   Method Type: YOLIE   Method Type: YOLIE        RADIOLOGY & ADDITIONAL STUDIES:  < from: US Kidney and Bladder (06.06.23 @ 14:01) >    ACC: [id number] EXAM:  US KIDNEYS AND BLADDER   ORDERED BY: MICHAEL DON     PROCEDURE DATE:  06/06/2023          INTERPRETATION:  CLINICAL INFORMATION: Hydronephrosis.    COMPARISON: 5/19/2023    TECHNIQUE: Sonography of the kidneys and bladder.    FINDINGS:    Right kidney: 11 cm. No hydronephrosis.    Left kidney:  11 cm. No hydronephrosis.    Urinary bladder: Collapsed around Fields catheter.    Partially imaged ascites.        IMPRESSION:    No hydronephrosis.        --- End of Report ---    MARILIN CORTES MD; Attending Radiologist  This document has been electronically signed. Jun 6 2023  2:57PM      < end of copied text >

## 2023-06-13 NOTE — CONSULT NOTE ADULT - NS ATTEND AMEND GEN_ALL_CORE FT
I personally saw and examined the patient, reviewed the chart and available data. I discussed the situation with the patient to the extent able, the nurse and SHONDA PA's. I also reviewed and/or amended the note as necessary.

## 2023-06-13 NOTE — PROGRESS NOTE ADULT - SUBJECTIVE AND OBJECTIVE BOX
LOIDAFLOWER KLINE  67y  Male  ***My note supersedes ALL resident notes that I sign.  My corrections for their notes are in my note.***    INTERVAL EVENTS: Here for f/u of CHF.   on iv lasix today , pending further recommendations from cardiology   Palliative following. patient is full code.   no acute events overnight     Vital Signs Last 24 Hrs  Vital Signs Last 24 Hrs  T(C): 35.9 (13 Jun 2023 04:53), Max: 36.6 (12 Jun 2023 14:34)  T(F): 96.6 (13 Jun 2023 04:53), Max: 97.9 (12 Jun 2023 14:34)  HR: 100 (13 Jun 2023 12:00) (94 - 105)  BP: 94/59 (13 Jun 2023 12:00) (87/53 - 110/68)  BP(mean): 69 (13 Jun 2023 12:00) (69 - 69)  RR: 18 (13 Jun 2023 11:00) (18 - 18)  SpO2: 95% (13 Jun 2023 11:00) (95% - 96%)    Parameters below as of 13 Jun 2023 11:00  Patient On (Oxygen Delivery Method): room air            06-10-23 @ 07:01  -  06-11-23 @ 07:00  --------------------------------------------------------  IN: 0 mL / OUT: 600 mL / NET: -600 mL    06-11-23 @ 07:01  - 06-11-23 @ 12:19  --------------------------------------------------------  IN: 0 mL / OUT: 200 mL / NET: -200 mL    Gen: NAD;   skin: numerous excoriations on arms/hands and left leg (pt tends to pick at skin)  HEENT: PERRL, mouth clr, nose clr  Neck: no nodes, no JVD, thyroid nl  chest: lt AICD  lungs: clr  hrt: s1 s2 reg; tachy; 2/6 sys murmur apex  abd: soft, NT/ND, no HS megaly  ext: no edema, no c/c  rt AKA - stump OK  lt leg w/ signif flexion contracture (prevents standing); has small ulcers on dorsal base of 1st toe; and small ulcers on tips of toes 2 and 4  neuro: aa, confused (not at baseline) ox1, cn grossly intact, can move arms fine; rt aka; lt leg w/ flex contract    LABS:                      11.0    (    78.2   8.20  )-----------( ---------      151      ( 10 Nael 2023 12:18 )             39.1    (    33.3     CAPILLARY BLOOD GLUCOSE  POCT Blood Glucose.: 143 (06-11-23 @ 11:55)  POCT Blood Glucose.: 138 (06-11-23 @ 07:49)  POCT Blood Glucose.: 115 (06-10-23 @ 21:06)  POCT Blood Glucose.: 146 (06-10-23 @ 16:50)  POCT Blood Glucose.: 149 (06-10-23 @ 11:34)  POCT Blood Glucose.: 187 (06-10-23 @ 07:43)  POCT Blood Glucose.: 224 (06-09-23 @ 21:18)  POCT Blood Glucose.: 230 (06-09-23 @ 17:16)    Culture - Blood (collected 06-06-23 @ 11:46)  Source: .Blood None  Preliminary Report (06-07-23 @ 22:01):    No growth to date.    Culture - Urine (collected 06-05-23 @ 12:15)  Source: Catheterized Catheterized  Final Report (06-08-23 @ 10:34):    >100,000 CFU/ml Klebsiella pneumoniae ESBL Multiple Morphological Strains  Organism: Klebsiella pneumoniae ESBL  Klebsiella pneumoniae ESBL (06-08-23 @ 10:34)  Organism: Klebsiella pneumoniae ESBL (06-08-23 @ 10:34)      Method Type: YOLIE      -  Amikacin: S <=16      -  Amoxicillin/Clavulanic Acid: S <=8/4 Consider reserving for cystitis when ampicillin/sulbactam is resistant      -  Ampicillin: R >16 These ampicillin results predict results for amoxicillin      -  Ampicillin/Sulbactam: R >16/8 Enterobacter, Klebsiella aerogenes, Citrobacter, and Serratia may develop resistance during prolonged therapy (3-4 days)      -  Aztreonam: R >16      -  Cefazolin: R >16 For uncomplicated UTI with K. pneumoniae, E. coli, or P. mirablis: YOLIE <=16 is sensitive and YOLIE >=32 is resistant. This also predicts results for oral agents cefaclor, cefdinir, cefpodoxime, cefprozil, cefuroxime axetil, cephalexin and locarbef for uncomplicated UTI. Note that some isolates may be susceptible to these agents while testing resistant to cefazolin.      -  Cefepime: R >16      -  Ceftriaxone: R >32 Enterobacter, Klebsiella aerogenes, Citrobacter, and Serratia may develop resistance during prolonged therapy      -  Cefuroxime: R >16      -  Ciprofloxacin: R >2      -  Ertapenem: S <=0.5      -  Gentamicin: R >8      -  Imipenem: S <=1      -  Levofloxacin: R 4      -  Meropenem: S <=1      -  Nitrofurantoin: S <=32 Should not be used to treat pyelonephritis      -  Piperacillin/Tazobactam: S <=8      -  Tobramycin: I 8      -  Trimethoprim/Sulfamethoxazole: R >2/38  Organism: Klebsiella pneumoniae ESBL (06-08-23 @ 10:34)      Method Type: YOLIE      -  Amikacin: S <=16      -  Amoxicillin/Clavulanic Acid: S <=8/4 Consider reserving for cystitis when ampicillin/sulbactam is resistant      -  Ampicillin: R >16 These ampicillin results predict results for amoxicillin      -  Ampicillin/Sulbactam: R >16/8 Enterobacter, Klebsiella aerogenes, Citrobacter, and Serratia may develop resistance during prolonged therapy (3-4 days)      -  Aztreonam: R >16      -  Cefazolin: R >16 For uncomplicated UTI with K. pneumoniae, E. coli, or P. mirablis: YOLIE <=16 is sensitive and YOLIE >=32 is resistant. This also predicts results for oral agents cefaclor, cefdinir, cefpodoxime, cefprozil, cefuroxime axetil, cephalexin and locarbef for uncomplicated UTI. Note that some isolates may be susceptible to these agents while testing resistant to cefazolin.      -  Cefepime: R 16      -  Ceftriaxone: R >32 Enterobacter, Klebsiella aerogenes, Citrobacter, and Serratia may develop resistance during prolonged therapy      -  Cefuroxime: R >16      -  Ciprofloxacin: R >2      -  Ertapenem: S <=0.5      -  Gentamicin: R >8      -  Imipenem: S <=1      -  Levofloxacin: R 2      -  Meropenem: S <=1      -  Nitrofurantoin: S <=32 Should not be used to treat pyelonephritis      -  Piperacillin/Tazobactam: S <=8      -  Tobramycin: S 4      -  Trimethoprim/Sulfamethoxazole: R >2/38    RADIOLOGY & ADDITIONAL TESTS:    MEDICATIONS:  meropenem  IVPB 1000 milliGRAM(s) IV Intermittent every 12 hours    acetaminophen     Tablet .. 325 milliGRAM(s) Oral every 4 hours PRN  aspirin enteric coated 81 milliGRAM(s) Oral daily  bacitracin   Ointment 1 Application(s) Topical every 12 hours  chlorhexidine 2% Cloths 1 Application(s) Topical daily  DULoxetine 30 milliGRAM(s) Oral <User Schedule>  folic acid 1 milliGRAM(s) Oral <User Schedule>  gabapentin 100 milliGRAM(s) Oral every 12 hours  heparin   Injectable 5000 Unit(s) SubCutaneous every 12 hours  insulin glargine Injectable (LANTUS) 12 Unit(s) SubCutaneous every morning  insulin lispro (ADMELOG) corrective regimen sliding scale   SubCutaneous three times a day before meals  levothyroxine 25 MICROGram(s) Oral <User Schedule>  levothyroxine 50 MICROGram(s) Oral <User Schedule>  mupirocin 2% Ointment 1 Application(s) Both Nostrils two times a day  pantoprazole    Tablet 40 milliGRAM(s) Oral before breakfast  polyethylene glycol 3350 17 Gram(s) Oral two times a day  prasugrel 10 milliGRAM(s) Oral daily  senna 2 Tablet(s) Oral at bedtime

## 2023-06-13 NOTE — CONSULT NOTE ADULT - ASSESSMENT
66 yo M PMHx chronic HFrEF 15-20% s/p ICD, decreased RV function, mild MR/TR, DM type I w/ neuropathy and hx hypoglycemia, DLD, HTN, CAD, hx MI, s/p CABG, s/p stent (2018), hx in-stent thrombosis while on Plavix, PAD s/p 3 LLE stents, TIAGO (needs CPAP auto-regulating pressure 10-16, patient doesn't use it due to claustrophobia), Psoriasis, R-AKA, BPH  presenting to ED s/p fall. He states he was lying in bed at home when he fell asleep and fell off the bed. Found to have MOISES and mild elevation of trop Downgrade from CCU; intub 6/5; extub 6/8; was in cardiogenic and septic shocks.   Urology called to evaluate Pt. for Urinary retention. Loja catheter was placed 20 minutes prior with 600 UO + pyuria at the end of drainage.  Pt. appears to be poor historian now , Pt's wife at bedside providing medical hx.   Pt. Is following with Dr. Garibay as an outpatient. As per wife Dr. Garibay- urologist, UDS " not fully completed" about one month ago, but even prior to that Dr. Garibay was recommending CIC at home-  Pt. was not compliant with CIC at home.       A: Urinary retention, + pyuria       Plan:  Cont. Flomax   Cont. Abx as per ID recommendations   Repeat Urine cx   Cont. Loja catheter drain to gravity, CIC Q6H when medically stable.   If Pt. is candidate to learn and perform CIC or family willing to assist please provide patient education on how to do CIC at home. Otherwise please place Loja catheter upon d/c home if Pt. is retaining urine.   F/U with Dr. Garibay as an outpatient for further urinary retention work-up.   Will D/W  attending      66 yo M PMHx chronic HFrEF 15-20% s/p ICD, decreased RV function, mild MR/TR, DM type I w/ neuropathy and hx hypoglycemia, DLD, HTN, CAD, hx MI, s/p CABG, s/p stent (2018), hx in-stent thrombosis while on Plavix, PAD s/p 3 LLE stents, TIAGO (needs CPAP auto-regulating pressure 10-16, patient doesn't use it due to claustrophobia), Psoriasis, R-AKA, BPH  presenting to ED s/p fall. He states he was lying in bed at home when he fell asleep and fell off the bed. Found to have MOISES and mild elevation of trop Downgrade from CCU; intub 6/5; extub 6/8; was in cardiogenic and septic shocks.   Urology called to evaluate Pt. for Urinary retention. Fields catheter was placed 20 minutes prior with 600 UO + pyuria at the end of drainage.  Pt. appears to be poor historian now , Pt's wife at bedside providing medical hx.   Pt. Is following with Dr. Connelly as an outpatient. As per wife Dr. Connelly- urologist, UDS " not fully completed" about one month ago, but even prior to that Dr. Connelly was recommending CIC at home-  Pt. was not compliant with CIC at home.       A: Urinary retention, + pyuria       Plan:  Cont. Flomax   Cont. Abx as per ID recommendations   Repeat Urine cx   Cont. Fields catheter drain to gravity, CIC Q6H when medically stable.   If Pt. is candidate to learn and perform CIC or family willing to assist please provide patient education on how to do CIC at home. Otherwise please place Fields catheter upon d/c home if Pt. is retaining urine.   F/U with Dr. Connelly as an outpatient for further urinary retention work-up.   Will D/W  attending     pt seen onafternoon rounds  his speech is difficult to understand and he starts to talk about isues not relevant to what wa sgoing on and then starts to cry  CIC is nto an option now as he still has cloudy urine and was non comliant  once urine is clear it depends on what the wife is able / willing to do  if she deandra do the CIC religiously that is optimal; if not then leave a fields and they will f/u with dr connelly the current urologist as that is what they prefer

## 2023-06-14 NOTE — PROGRESS NOTE ADULT - ASSESSMENT
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 He states he was lying in bed at home when he fell asleep and fell off the bed. Found to have moises and mild elevation of trop     # Downgrade from CCU; Confused- intub for airway protection 6/5; extub 6/8; was in Acute HFrEF, cardiogenic and septic shocks 2/2 ESBL UTI, was on pressors and ionotropes-resolved now    # Baseline fxn per family: pt has severe lt leg flexion contracture, which prohibits standing and ability to get prosthetic for rt leg; pt needs asst, but can help w/ transfers to WC or car; pt has not been in shower in years, just sponge baths; MS is usually "normal" (perhaps rare, slight forgetfulness); can feed self; needs asst w/ dressing; cannot do chores; family cares for him; pt, with asst, can get out of house - has ramps, uses slide board to get in and out of car, uses WC to get around      # Acute on chronic HFrEF 20%; biventricular dysfxn; dilated CM; mod MR; mod-sev TR; HTN; CAD; MI; s/p CABG; s/p stent; PAD s/p stent and rt AKA  #Cardiogenic shock s/p swan catheter and ionotrops- now resolved  - cardio and HF following  - no GDMT since BP low (pt allergic to ACEI and Entresto)  - HOLD toprol xl 25mg q24  - c/w DAPT: asa + prasugrel (pt allergic to plavix)  - daily wts, Is and Os  - Currently On RA satting well  - CXR slightly better, IVC 2cm, <50% collapsible, lactate normal  - HF following- s/p IV Lasix 40mg BID on 6/13>> switched to Torsemide 20mg qd today  - started on Hydralazine 10mg TID    # Hospital delirum; poss underlying mild vasc-induced cog impairment  - Pt. intub for airway protection 6/5; extub 6/8;  - CTH noted: chr microvasc changes  - EEG -ve  - can use melatonin for sleep  - not req anti-psych    # Severe malnutrition; hypernatremia; hx of DISH causing dysphagia   -  puree + mild thick liquids, c/w supplements and MVs  - s/p Mod. barium swallow today- c/w current diet, pt. at high risk of aspiration, needs multiple swallows, monitor for weight loss and malnutrition, aspiration precautions  - Encourage oral hydration up to 1.5 L /day    #Acute Urinary retention s/p fields placement on 6/13  #Septic shock 2/2 UTI 2/2 mult strains ESBL Kleb- resolved  - ID eval appreciated- c/w abx: laquita 1gm iv q12 till 6/14 last dose today  - BCx neg  - Urology eval appreciated- pt. following Dr. connelly OP, pending UDS, recommended CIC, but pt. non-compliant to that, wife agreeable to help with CIC, most likely pt. will need STR and will be dc'ed with fields    # Skin excoriations; periph neuropathy 2/2 DM and vasc  - can use bacitracin on skin  - MRSA nares +: s/p bactroban oint to nares top q12 for 5 days (6/7-6/12)  - sponge bath  - c/w neurontin 100mg po q12  - c/w Duloxetine 30mg 3x/day  - can tx pain as needed    # MOISES on CKD3- resolved  # Anemia of chr dz plus iron def anemia  - base Cr low 1s  - peak CR 3.6, now back to baseline  - iron sat 3%; ferr 56 s/p venofer x5days  - will use oral iron as outpt  - will need OUTPT GI eval once stable as outpt for awhile    # DM T2 w/ neuropathy  - c/w diabetic DASH diet  - FS qac/hs - goal 100-180  - c/w lantu2 12 HS (on up to 44 units at home)  - SSI - might need lisp w/ meals    #Hypothyroid; anti TPO Ab+ >>c/w synthroid 25 mcg q24 and 50 on Sunday  - TSH 5.6 in 1/2023- outpt f/u    # DLD  - on crestor at home and pt allergic to lipitor- statins on hold  - Lipid panel wnl  - LFTs were prob abnl from passive congestion - LFTs improving    # Rheum Arth- c/w MTX 10mg po q wed + folate 1mg po q24; Holding Pred 5mg po q24  # bowel reg cont- PEG q12 + senna 2 HS  # TIAGO- CPAP not in use 2/2 claustrophobia  # BPH- restarted Flomax 04 on 6/13 on BID at home may increase to BID if BP allows  # Anxiety- on valium 1-2mg at home prn - hold off for now 2/2 delirium    # Activity: family brought in anti-flex contracture device; PT eval with prosthesis; OOBTC as tolerated  # DVT ppx: Hep sc q12  # GI ppx: change PPI to 40mg po q24 (not IV)  # Code Status: Full     Dispo: Diuresis; urinary retention, IV abx ending today, pt will go to SNF for STR vs home - f/u CM and PT

## 2023-06-14 NOTE — PROGRESS NOTE ADULT - SUBJECTIVE AND OBJECTIVE BOX
LOIDA, JOHN  67y Male    INTERVAL HPI/OVERNIGHT EVENTS:    pt seen with wife at bedside today  he feels well - denies pain, SOB  tolerating pureed diet  no fever  donnie replaced yesterday for retention  discharge planning in progress    T(F): 97.2 (06-14-23 @ 11:52), Max: 97.6 (06-14-23 @ 05:31)  HR: 97 (06-14-23 @ 11:52) (97 - 104)  BP: 90/54 (06-14-23 @ 11:52) (90/54 - 107/64)  RR: 18 (06-14-23 @ 11:52) (18 - 18)  SpO2: 98% (06-14-23 @ 11:52) (98% - 98%) on RA  I&O's Summary    13 Jun 2023 07:01  -  14 Jun 2023 07:00  --------------------------------------------------------  IN: 0 mL / OUT: 2650 mL / NET: -2650 mL      CAPILLARY BLOOD GLUCOSE      POCT Blood Glucose.: 190 mg/dL (14 Jun 2023 12:05)  POCT Blood Glucose.: 174 mg/dL (14 Jun 2023 07:39)  POCT Blood Glucose.: 149 mg/dL (14 Jun 2023 05:46)  POCT Blood Glucose.: 212 mg/dL (13 Jun 2023 21:57)  POCT Blood Glucose.: 233 mg/dL (13 Jun 2023 17:13)        PHYSICAL EXAM:  GENERAL: NAD  HEAD:  Normocephalic  EYES:  conjunctiva and sclera clear  ENMT: Moist mucous membranes  NERVOUS SYSTEM:  Alert, awake, Good concentration, improved speech per wife (clear, understandable)  CHEST/LUNG: CTA b/l (only anterior auscultated)  HEART: Regular rate and rhythm  ABDOMEN: Soft, Nontender, Nondistended  EXTREMITIES:   No edema left LE  s/p right AKA - stump   SKIN: multiple excoriated lesions of arms and left LE    Consultant(s) Notes Reviewed:  [x ] YES  [ ] NO  Care Discussed with Consultants/Other Providers [ x] YES  [ ] NO    MEDICATIONS  (STANDING):  aspirin enteric coated 81 milliGRAM(s) Oral daily  bacitracin   Ointment 1 Application(s) Topical every 12 hours  chlorhexidine 2% Cloths 1 Application(s) Topical daily  dextrose 5%. 1000 milliLiter(s) (50 mL/Hr) IV Continuous <Continuous>  dextrose 5%. 1000 milliLiter(s) (100 mL/Hr) IV Continuous <Continuous>  dextrose 50% Injectable 25 Gram(s) IV Push once  dextrose 50% Injectable 12.5 Gram(s) IV Push once  dextrose 50% Injectable 25 Gram(s) IV Push once  DULoxetine 30 milliGRAM(s) Oral <User Schedule>  folic acid 1 milliGRAM(s) Oral <User Schedule>  gabapentin 100 milliGRAM(s) Oral every 12 hours  glucagon  Injectable 1 milliGRAM(s) IntraMuscular once  heparin   Injectable 5000 Unit(s) SubCutaneous every 12 hours  hydrALAZINE 10 milliGRAM(s) Oral three times a day  insulin glargine Injectable (LANTUS) 12 Unit(s) SubCutaneous every morning  insulin lispro (ADMELOG) corrective regimen sliding scale   SubCutaneous three times a day before meals  levothyroxine 50 MICROGram(s) Oral <User Schedule>  levothyroxine 25 MICROGram(s) Oral <User Schedule>  meropenem  IVPB 1000 milliGRAM(s) IV Intermittent every 12 hours  multivitamin/minerals 1 Tablet(s) Oral daily  pantoprazole    Tablet 40 milliGRAM(s) Oral before breakfast  polyethylene glycol 3350 17 Gram(s) Oral two times a day  prasugrel 10 milliGRAM(s) Oral daily  senna 2 Tablet(s) Oral at bedtime  tamsulosin 0.4 milliGRAM(s) Oral at bedtime  torsemide 20 milliGRAM(s) Oral daily    MEDICATIONS  (PRN):  acetaminophen     Tablet .. 325 milliGRAM(s) Oral every 4 hours PRN Moderate Pain (4 - 6)  dextrose Oral Gel 15 Gram(s) Oral once PRN Blood Glucose LESS THAN 70 milliGRAM(s)/deciliter      LABS:                        11.2   8.36  )-----------( 243      ( 14 Jun 2023 05:08 )             37.4     06-14    141  |  102  |  30<H>  ----------------------------<  176<H>  4.6   |  30  |  1.0    Ca    8.3<L>      14 Jun 2023 05:08  Mg     2.0     06-14    TPro  6.0  /  Alb  3.0<L>  /  TBili  1.5<H>  /  DBili  x   /  AST  27  /  ALT  21  /  AlkPhos  99  06-14          RADIOLOGY & ADDITIONAL TESTS:    Imaging and report Personally Reviewed:  [x ] YES  [ ] NO    < from: Xray Chest 1 View- PORTABLE-Routine (Xray Chest 1 View- PORTABLE-Routine in AM.) (06.14.23 @ 06:39) >  IMPRESSION:    No significant change in bilateral opacities/effusions, allowing for   differences in technique.    < end of copied text >      < from: US Kidney and Bladder (06.13.23 @ 21:02) >    IMPRESSION:    Mild fullness of the left renal collecting system.    Otherwise unremarkable.      < end of copied text >      < from: VA Duplex Low Ext Arterial, Ltd, Left (06.06.23 @ 14:43) >  Impression:    Normal arterial flow in the left lower extremity.      < end of copied text >  < from: VA Duplex Lower Ext Vein Scan, Left (06.06.23 @ 14:40) >  Impression:    No evidence of deep venous thrombosis or superficial thrombophlebitis in   the left lower extremity.      < end of copied text >      < from: TTE Echo Complete w/ Contrast w/ Doppler (05.24.23 @ 10:49) >  Summary:   1. Severely decreased global left ventricular systolic function.   2. Multiple left ventricular regional wall motion abnormalities exist.   See wall motion findings.   3. LV Ejection Fraction by Wilkerson's Method with a biplane EF of 20 %.   4. Global cardiomyopathy.   5. Normal left ventricular internal cavity size.   6. Mildly enlarged right ventricle.   7. Moderately reduced RV systolic function.   8. Mildly enlarged right atrium.   9. Moderately enlarged leftatrium.  10. There is no evidence of pericardial effusion.  11. Mild thickening and calcification of the anterior and posterior   mitral valve leaflets.  12. Moderate mitral annular calcification.  13. Moderate mitral valve regurgitation.  14. Moderate-severe tricuspid regurgitation.      < end of copied text >      Case discussed with residents and RN on rounds today    Care discussed with pt and family

## 2023-06-14 NOTE — PROGRESS NOTE ADULT - SUBJECTIVE AND OBJECTIVE BOX
SUBJECTIVE:    Patient is a 67y old Male who presents with a chief complaint of Mechanical fall (13 Jun 2023 16:27)    Currently admitted to medicine with the primary diagnosis of Acute HF (heart failure)       Today is hospital day 27d. This morning he is resting comfortably in bed and reports no new issues or overnight events.     PAST MEDICAL & SURGICAL HISTORY  Status post percutaneous transluminal coronary angioplasty  2 stents    Atherosclerosis of coronary artery  CAD (coronary artery disease)    Osteoarthritis    HLD (hyperlipidemia)    Diabetes  on insulin pump    CHF (congestive heart failure)  EF ~ 25%    History of celiac disease    Diverticulitis    STEMI (ST elevation myocardial infarction)    Diabetic neuropathy  Hands and Feet    Anxiety and depression    Other postprocedural status  Fixation hardware in foot LEFT    Stented coronary artery  10/18 heart attack  INFERIOR WALL MI    S/P CABG x 1  2018    S/P TKR (total knee replacement), right  with infection Mrsa   per pt he was cleared from MRSA infection    Surgery, elective  right knee wound debridement    History of open reduction and internal fixation (ORIF) procedure  right hip    H/O shoulder surgery  right    AICD (automatic cardioverter/defibrillator) present  St Kali    Cholecystostomy care  drain inserted 2020 & removed 4 months ago    History of tonsillectomy    History of hip replacement, total, right    Elective surgery  plastic surgery Left shin      SOCIAL HISTORY:  Negative for smoking/alcohol/drug use.     ALLERGIES:  ACE inhibitors (Rash)  Entresto (Swelling)  Atorvastatin Calcium (Unknown)  Bactrim (Unknown)  Plavix (Unknown)    MEDICATIONS:  STANDING MEDICATIONS  aspirin enteric coated 81 milliGRAM(s) Oral daily  bacitracin   Ointment 1 Application(s) Topical every 12 hours  chlorhexidine 2% Cloths 1 Application(s) Topical daily  dextrose 5%. 1000 milliLiter(s) IV Continuous <Continuous>  dextrose 5%. 1000 milliLiter(s) IV Continuous <Continuous>  dextrose 50% Injectable 12.5 Gram(s) IV Push once  dextrose 50% Injectable 25 Gram(s) IV Push once  dextrose 50% Injectable 25 Gram(s) IV Push once  DULoxetine 30 milliGRAM(s) Oral <User Schedule>  folic acid 1 milliGRAM(s) Oral <User Schedule>  gabapentin 100 milliGRAM(s) Oral every 12 hours  glucagon  Injectable 1 milliGRAM(s) IntraMuscular once  heparin   Injectable 5000 Unit(s) SubCutaneous every 12 hours  hydrALAZINE 10 milliGRAM(s) Oral three times a day  insulin glargine Injectable (LANTUS) 12 Unit(s) SubCutaneous every morning  insulin lispro (ADMELOG) corrective regimen sliding scale   SubCutaneous three times a day before meals  levothyroxine 25 MICROGram(s) Oral <User Schedule>  levothyroxine 50 MICROGram(s) Oral <User Schedule>  meropenem  IVPB 1000 milliGRAM(s) IV Intermittent every 12 hours  multivitamin/minerals 1 Tablet(s) Oral daily  pantoprazole    Tablet 40 milliGRAM(s) Oral before breakfast  polyethylene glycol 3350 17 Gram(s) Oral two times a day  prasugrel 10 milliGRAM(s) Oral daily  senna 2 Tablet(s) Oral at bedtime  tamsulosin 0.4 milliGRAM(s) Oral at bedtime  torsemide 20 milliGRAM(s) Oral daily    PRN MEDICATIONS  acetaminophen     Tablet .. 325 milliGRAM(s) Oral every 4 hours PRN  dextrose Oral Gel 15 Gram(s) Oral once PRN    VITALS:   T(F): 97.6  HR: 103  BP: 101/66  RR: 18  SpO2: 98%    LABS:                        11.2   8.36  )-----------( 243      ( 14 Jun 2023 05:08 )             37.4     06-14    141  |  102  |  30<H>  ----------------------------<  176<H>  4.6   |  30  |  1.0    Ca    8.3<L>      14 Jun 2023 05:08  Mg     2.0     06-14    TPro  6.0  /  Alb  3.0<L>  /  TBili  1.5<H>  /  DBili  x   /  AST  27  /  ALT  21  /  AlkPhos  99  06-14          Lactate, Blood: 1.2 mmol/L (06-13-23 @ 11:08)              PHYSICAL EXAM:  GEN: No acute distress  LUNGS: reduced breath sounds at base Lt.>Rt.  HEART: S1/S2 present. RRR.   ABD: Soft, non-tender, non-distended. Bowel sounds present  EXT: Rt. AKA, B/L UE and LLE excoriations  NEURO: AAOX1, confused      Intravenous access:   NG tube:   Loja Catheter:   Indwelling Urethral Catheter:     Connect To:  Leg Bag    Indication:  Urinary Retention / Obstruction (06-13-23 @ 13:20) (not performed) [Active]

## 2023-06-14 NOTE — PROGRESS NOTE ADULT - ASSESSMENT
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 He was admitted and hospital course was complicated by acute over chronic HFrEF, NSTEMI, lactic acidosis, acute respiratory failure (required intubation because he could not protect his airway), cardiogenic/ septic shock, dysphagia, delirium over possible cognitive impairment, ESBL Klebsiella UTI, MOISES over CKD and urinary retention.   Previous notes reviewed by me.    1. Acute over chronic HFrEF- EF 20%, s/p AICD  ECHO report reviewed  heart failure f/u appreciated  s/p IV lasix and now on torsemide 20mg daily  bblocker on hold  on hydralazine 10mg tid  no GDMT since BP is low (pt allergic to ACEI and Entresto)  daily wts, monitor urine output  CXR with PVC - reviewed by me  cardio: Dr. Nelson    2. NSTEMI  CAD s/p MI, CABG, stent  c/w DAPT: asa + prasugrel (pt allergic to plavix)    3. ESBL Klebsiella UTI - complete course of meropenem today    4. Dysphagia - hx of DISH causing dysphagia - diet per S&S - pureed with mildly thick liquids - aspiration precautions  supplements as ordered    5. Delirium - mental status improved  reorient as needed  family involved    6. MOISES over CKD 3 - resolved  Cr stable at 1    7. BAIRON - s/p venofer - oral iron on discharge - outpt GI eval     8. DM with neuropathy  diabetic DASH diet  FS qac/hs - goal 120-180  on lantus and sliding scale - if FS >180, add lispro with meals    9. Hypothyroid on synthroid    10. Dyslipidemia on Crestor at home  pt allergic to lipitor    11. RA  on methotrexate 10mg po q Wed + folate 1mg po q24  Holding Prednisone 5mg po q24    12. Urinary retention  fields replaced on 6/13  Urology consult appreciated  pt finishing course of abx today for UTI  on flomax 0.4mg daily  since pt will be discharged to SNF, keep fields for now  needs outpt f/u with Dr. Garibay    13. TIAGO  CPAP not in use due to claustrophobia    14. DVT prophylaxis   heparin SQ q12hr  wife brought in device for left LE  continue PT as tolerated  OOB to chair  (pt does not have prosthesis)      full code  guarded prognosis  Patient is at high risk of clinical deterioration and demise. Family aware per chart.      PROGRESS NOTE HANDOFF    Pending: monitor volume status on torsemide, complete meropenem, BMP in AM, discharge planning to SNF    Family discussion: with wife at bedside today    Disposition: SNF

## 2023-06-15 NOTE — PROGRESS NOTE ADULT - SUBJECTIVE AND OBJECTIVE BOX
SUBJECTIVE:    Patient is a 67y old Male who presents with a chief complaint of Mechanical fall (14 Jun 2023 15:45)    Currently admitted to medicine with the primary diagnosis of Acute HF (heart failure)       Today is hospital day 28d. This morning he is resting comfortably in bed and reports no new issues or overnight events.     PAST MEDICAL & SURGICAL HISTORY  Status post percutaneous transluminal coronary angioplasty  2 stents    Atherosclerosis of coronary artery  CAD (coronary artery disease)    Osteoarthritis    HLD (hyperlipidemia)    Diabetes  on insulin pump    CHF (congestive heart failure)  EF ~ 25%    History of celiac disease    Diverticulitis    STEMI (ST elevation myocardial infarction)    Diabetic neuropathy  Hands and Feet    Anxiety and depression    Other postprocedural status  Fixation hardware in foot LEFT    Stented coronary artery  10/18 heart attack  INFERIOR WALL MI    S/P CABG x 1  2018    S/P TKR (total knee replacement), right  with infection Mrsa   per pt he was cleared from MRSA infection    Surgery, elective  right knee wound debridement    History of open reduction and internal fixation (ORIF) procedure  right hip    H/O shoulder surgery  right    AICD (automatic cardioverter/defibrillator) present  St Kali    Cholecystostomy care  drain inserted 2020 & removed 4 months ago    History of tonsillectomy    History of hip replacement, total, right    Elective surgery  plastic surgery Left shin      SOCIAL HISTORY:  Negative for smoking/alcohol/drug use.     ALLERGIES:  ACE inhibitors (Rash)  Entresto (Swelling)  Atorvastatin Calcium (Unknown)  Bactrim (Unknown)  Plavix (Unknown)    MEDICATIONS:  STANDING MEDICATIONS  aspirin enteric coated 81 milliGRAM(s) Oral daily  bacitracin   Ointment 1 Application(s) Topical every 12 hours  chlorhexidine 2% Cloths 1 Application(s) Topical daily  dextrose 5%. 1000 milliLiter(s) IV Continuous <Continuous>  dextrose 5%. 1000 milliLiter(s) IV Continuous <Continuous>  dextrose 50% Injectable 25 Gram(s) IV Push once  dextrose 50% Injectable 12.5 Gram(s) IV Push once  dextrose 50% Injectable 25 Gram(s) IV Push once  DULoxetine 30 milliGRAM(s) Oral <User Schedule>  folic acid 1 milliGRAM(s) Oral <User Schedule>  gabapentin 100 milliGRAM(s) Oral every 12 hours  glucagon  Injectable 1 milliGRAM(s) IntraMuscular once  heparin   Injectable 5000 Unit(s) SubCutaneous every 12 hours  hydrALAZINE 10 milliGRAM(s) Oral three times a day  insulin glargine Injectable (LANTUS) 12 Unit(s) SubCutaneous every morning  insulin lispro (ADMELOG) corrective regimen sliding scale   SubCutaneous three times a day before meals  levothyroxine 25 MICROGram(s) Oral <User Schedule>  levothyroxine 50 MICROGram(s) Oral <User Schedule>  multivitamin/minerals 1 Tablet(s) Oral daily  pantoprazole    Tablet 40 milliGRAM(s) Oral before breakfast  polyethylene glycol 3350 17 Gram(s) Oral two times a day  prasugrel 10 milliGRAM(s) Oral daily  senna 2 Tablet(s) Oral at bedtime  tamsulosin 0.4 milliGRAM(s) Oral at bedtime  torsemide 20 milliGRAM(s) Oral daily    PRN MEDICATIONS  acetaminophen     Tablet .. 325 milliGRAM(s) Oral every 4 hours PRN  dextrose Oral Gel 15 Gram(s) Oral once PRN    VITALS:   T(F): 97.1  HR: 95  BP: 99/62  RR: 18  SpO2: 98%    LABS:                        11.2   8.36  )-----------( 243      ( 14 Jun 2023 05:08 )             37.4     06-14    141  |  102  |  30<H>  ----------------------------<  176<H>  4.6   |  30  |  1.0    Ca    8.3<L>      14 Jun 2023 05:08  Mg     2.0     06-14    TPro  6.0  /  Alb  3.0<L>  /  TBili  1.5<H>  /  DBili  x   /  AST  27  /  ALT  21  /  AlkPhos  99  06-14                    PHYSICAL EXAM:  GEN: No acute distress  LUNGS: reduced breath sounds at base Lt.>Rt.  HEART: S1/S2 present. RRR.   ABD: Soft, non-tender, non-distended. Bowel sounds present  EXT: Rt. AKA, B/L UE and LLE excoriations  NEURO: AAOX1, confused      Intravenous access:   NG tube:   Loja Catheter:   Indwelling Urethral Catheter:     Connect To:  Leg Bag    Indication:  Urinary Retention / Obstruction (06-15-23 @ 09:18) (not performed) [Active]  Indwelling Urethral Catheter:     Connect To:  Leg Bag    Indication:  Urinary Retention / Obstruction (06-14-23 @ 09:41) (not performed) [Active]  Indwelling Urethral Catheter:     Connect To:  Leg Bag    Indication:  Urinary Retention / Obstruction (06-13-23 @ 13:20) (not performed) [Active]

## 2023-06-15 NOTE — PROGRESS NOTE ADULT - SUBJECTIVE AND OBJECTIVE BOX
FLOWER MARISCAL  67y Male    INTERVAL HPI/OVERNIGHT EVENTS:    pt seen with wife at bedside today  he feels well - denies pain, SOB  tolerating pureed diet  no fever  fields replaced for retention  discharge planning in progress    Vital Signs Last 24 Hrs  T(C): 36.2 (15 Nael 2023 05:17), Max: 36.2 (15 Nael 2023 05:17)  T(F): 97.1 (15 Nael 2023 05:17), Max: 97.1 (15 Nael 2023 05:17)  HR: 95 (15 Nael 2023 05:17) (95 - 104)  BP: 99/62 (15 Nael 2023 05:17) (99/62 - 151/69)  BP(mean): --  RR: 18 (15 Nael 2023 05:17) (18 - 20)  SpO2: --          CAPILLARY BLOOD GLUCOSE      POCT Blood Glucose.: 190 mg/dL (14 Jun 2023 12:05)  POCT Blood Glucose.: 174 mg/dL (14 Jun 2023 07:39)  POCT Blood Glucose.: 149 mg/dL (14 Jun 2023 05:46)  POCT Blood Glucose.: 212 mg/dL (13 Jun 2023 21:57)  POCT Blood Glucose.: 233 mg/dL (13 Jun 2023 17:13)        PHYSICAL EXAM:  GENERAL: NAD  HEAD:  Normocephalic  EYES:  conjunctiva and sclera clear  ENMT: Moist mucous membranes  NERVOUS SYSTEM:  Alert, awake, Good concentration, improved speech per wife (clear, understandable)  CHEST/LUNG: CTA b/l (only anterior auscultated)  HEART: Regular rate and rhythm  ABDOMEN: Soft, Nontender, Nondistended  EXTREMITIES:   No edema left LE  s/p right AKA - stump   SKIN: multiple excoriated lesions of arms and left LE    Consultant(s) Notes Reviewed:  [x ] YES  [ ] NO  Care Discussed with Consultants/Other Providers [ x] YES  [ ] NO    MEDICATIONS  (STANDING):  aspirin enteric coated 81 milliGRAM(s) Oral daily  bacitracin   Ointment 1 Application(s) Topical every 12 hours  chlorhexidine 2% Cloths 1 Application(s) Topical daily  dextrose 5%. 1000 milliLiter(s) (50 mL/Hr) IV Continuous <Continuous>  dextrose 5%. 1000 milliLiter(s) (100 mL/Hr) IV Continuous <Continuous>  dextrose 50% Injectable 25 Gram(s) IV Push once  dextrose 50% Injectable 12.5 Gram(s) IV Push once  dextrose 50% Injectable 25 Gram(s) IV Push once  DULoxetine 30 milliGRAM(s) Oral <User Schedule>  folic acid 1 milliGRAM(s) Oral <User Schedule>  gabapentin 100 milliGRAM(s) Oral every 12 hours  glucagon  Injectable 1 milliGRAM(s) IntraMuscular once  heparin   Injectable 5000 Unit(s) SubCutaneous every 12 hours  hydrALAZINE 10 milliGRAM(s) Oral three times a day  insulin glargine Injectable (LANTUS) 12 Unit(s) SubCutaneous every morning  insulin lispro (ADMELOG) corrective regimen sliding scale   SubCutaneous three times a day before meals  levothyroxine 50 MICROGram(s) Oral <User Schedule>  levothyroxine 25 MICROGram(s) Oral <User Schedule>  meropenem  IVPB 1000 milliGRAM(s) IV Intermittent every 12 hours  multivitamin/minerals 1 Tablet(s) Oral daily  pantoprazole    Tablet 40 milliGRAM(s) Oral before breakfast  polyethylene glycol 3350 17 Gram(s) Oral two times a day  prasugrel 10 milliGRAM(s) Oral daily  senna 2 Tablet(s) Oral at bedtime  tamsulosin 0.4 milliGRAM(s) Oral at bedtime  torsemide 20 milliGRAM(s) Oral daily    MEDICATIONS  (PRN):  acetaminophen     Tablet .. 325 milliGRAM(s) Oral every 4 hours PRN Moderate Pain (4 - 6)  dextrose Oral Gel 15 Gram(s) Oral once PRN Blood Glucose LESS THAN 70 milliGRAM(s)/deciliter      LABS:                        11.2   8.36  )-----------( 243      ( 14 Jun 2023 05:08 )             37.4     06-14    141  |  102  |  30<H>  ----------------------------<  176<H>  4.6   |  30  |  1.0    Ca    8.3<L>      14 Jun 2023 05:08  Mg     2.0     06-14    TPro  6.0  /  Alb  3.0<L>  /  TBili  1.5<H>  /  DBili  x   /  AST  27  /  ALT  21  /  AlkPhos  99  06-14          RADIOLOGY & ADDITIONAL TESTS:    Imaging and report Personally Reviewed:  [x ] YES  [ ] NO    < from: Xray Chest 1 View- PORTABLE-Routine (Xray Chest 1 View- PORTABLE-Routine in AM.) (06.14.23 @ 06:39) >  IMPRESSION:    No significant change in bilateral opacities/effusions, allowing for   differences in technique.    < end of copied text >      < from: US Kidney and Bladder (06.13.23 @ 21:02) >    IMPRESSION:    Mild fullness of the left renal collecting system.    Otherwise unremarkable.      < end of copied text >      < from: VA Duplex Low Ext Arterial, Ltd, Left (06.06.23 @ 14:43) >  Impression:    Normal arterial flow in the left lower extremity.      < end of copied text >  < from: VA Duplex Lower Ext Vein Scan, Left (06.06.23 @ 14:40) >  Impression:    No evidence of deep venous thrombosis or superficial thrombophlebitis in   the left lower extremity.      < end of copied text >      < from: TTE Echo Complete w/ Contrast w/ Doppler (05.24.23 @ 10:49) >  Summary:   1. Severely decreased global left ventricular systolic function.   2. Multiple left ventricular regional wall motion abnormalities exist.   See wall motion findings.   3. LV Ejection Fraction by Wilkerson's Method with a biplane EF of 20 %.   4. Global cardiomyopathy.   5. Normal left ventricular internal cavity size.   6. Mildly enlarged right ventricle.   7. Moderately reduced RV systolic function.   8. Mildly enlarged right atrium.   9. Moderately enlarged leftatrium.  10. There is no evidence of pericardial effusion.  11. Mild thickening and calcification of the anterior and posterior   mitral valve leaflets.  12. Moderate mitral annular calcification.  13. Moderate mitral valve regurgitation.  14. Moderate-severe tricuspid regurgitation.      < end of copied text >      Case discussed with residents and RN on rounds today    Care discussed with pt and family

## 2023-06-15 NOTE — ED PROVIDER NOTE - NSFOLLOWUPINSTRUCTIONS_ED_ALL_ED_FT
fever, bodyache, headache, diarrhea/abdominal pain follow up on fri  wound vac to be placed ronal  keep clean and dry, apply xeroform if bleeding occurs, elevate leg, tylenol if needed

## 2023-06-15 NOTE — ADVANCED PRACTICE NURSE CONSULT - ASSESSMENT
History of Present Illness:   67-year-old male w/ HFrEF 15-20% s/p ICD, decreased RV function, mild MR/TR, DM type I w/ neuropathy and hx hypoglycemia , DL, HTN, CAD, hx MI, s/p CABG, s/p stent (2018), hx in-stent thrombosis while on Plavix, PAD s/p 3 LLE stents, TIAGO (needs CPAP auto-regulating pressure 10-16, patient doesn't use it due to claustrophobia), Psoriasis, R-AKA presenting to ED s/p fall. He states he was lying in bed at home when he fell asleep and fell off the bed, and complains of generalized headache neck pain, non-radiating, no alleviating or aggravating factors, associated with right anterior chest wall pain. Denies LOC,  fevers, chills, nausea, vomiting, dizziness, abdominal pain, shortness of breath. States he had TDAP recently. Pt was not down for long as wife was upstairs when he fell off the bed and called EMS right away.     Patient received lying in bed. Alert and oriented.  Limited mobility. Loja in place. High risk for pressure injury.    Wound #1  Type of Wound: Friction wound  Location: Right buttock  Measurements: ~[id number]  Tunneling /Undermining: No  Wound bed: Pale pink no necrotic tissue  Wound edges: Irregular  Periwound: Intact  Wound exudate: No  Wound odor: No  Induration, erythema, warmth: No  Wound pain: No    Wound#2  Type of Wound: Skin tear (patient states has had since february)  Location: Left forearm  Measurements: ~[id number]  Tunneling /Undermining: No  Wound bed: Dry pink to yellow full thickness skin loss  Wound edges: Intact  Periwound: Intact  Wound exudate: None  Wound odor: No  Induration, erythema, warmth: No  Wound pain: No    Multiple dry chronic wounds noted to bilateral upper and lower extremities. No exudate, odor, or erythema.    Skin to left heel intact at time of assessment. History of Present Illness:   67-year-old male w/ HFrEF 15-20% s/p ICD, decreased RV function, mild MR/TR, DM type I w/ neuropathy and hx hypoglycemia , DL, HTN, CAD, hx MI, s/p CABG, s/p stent (2018), hx in-stent thrombosis while on Plavix, PAD s/p 3 LLE stents, TIAGO (needs CPAP auto-regulating pressure 10-16, patient doesn't use it due to claustrophobia), Psoriasis, R-AKA presenting to ED s/p fall. He states he was lying in bed at home when he fell asleep and fell off the bed, and complains of generalized headache neck pain, non-radiating, no alleviating or aggravating factors, associated with right anterior chest wall pain. Denies LOC,  fevers, chills, nausea, vomiting, dizziness, abdominal pain, shortness of breath. States he had TDAP recently. Pt was not down for long as wife was upstairs when he fell off the bed and called EMS right away.     Allergies and Intolerances:        Allergies:  	ACE inhibitors: Drug Category, Rash  	Entresto: Drug, Swelling  	Atorvastatin Calcium: Drug, Unknown  	Bactrim: Drug, Unknown  	Plavix: Drug, Unknown    Patient received lying in bed. Alert and oriented.  Limited mobility. Loja in place. High risk for pressure injury.    Wound #1  Type of Wound: Friction wound  Location: Right buttock  Measurements: ~[id number]  Tunneling /Undermining: No  Wound bed: Pale pink no necrotic tissue  Wound edges: Irregular  Periwound: Intact  Wound exudate: No  Wound odor: No  Induration, erythema, warmth: No  Wound pain: No    Wound#2  Type of Wound: Skin tear (patient states has had since february)  Location: Left forearm  Measurements: ~[id number]  Tunneling /Undermining: No  Wound bed: Dry pink to yellow full thickness skin loss  Wound edges: Intact  Periwound: Intact  Wound exudate: None  Wound odor: No  Induration, erythema, warmth: No  Wound pain: No    Multiple dry chronic wounds noted to bilateral upper and lower extremities. No exudate, odor, or erythema.    Skin to left heel intact at time of assessment.

## 2023-06-15 NOTE — PROGRESS NOTE ADULT - ASSESSMENT
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 He was admitted and hospital course was complicated by acute over chronic HFrEF, NSTEMI, lactic acidosis, acute respiratory failure (required intubation because he could not protect his airway), cardiogenic/ septic shock, dysphagia, delirium over possible cognitive impairment, ESBL Klebsiella UTI, MOISES over CKD and urinary retention.   Previous notes reviewed by me.    1. Acute over chronic HFrEF- EF 20%, s/p AICD  ECHO report reviewed  heart failure f/u appreciated  s/p IV lasix and now on torsemide 20mg daily  bblocker on hold  on hydralazine 10mg tid  no GDMT since BP is low (pt allergic to ACEI and Entresto)  daily wts, monitor urine output  CXR with PVC - reviewed by me  cardio: Dr. Nelson    2. NSTEMI  CAD s/p MI, CABG, stent  c/w DAPT: asa + prasugrel (pt allergic to plavix)    3. ESBL Klebsiella UTI - complete course of meropenem    4. Dysphagia - hx of DISH causing dysphagia - diet per S&S - pureed with mildly thick liquids - aspiration precautions  supplements as ordered    5. Delirium - mental status improved  reorient as needed  family involved    6. MOISES over CKD 3 - resolved  Cr stable at 1    7. BAIRON - s/p venofer - oral iron on discharge - outpt GI eval     8. DM with neuropathy  diabetic DASH diet  FS qac/hs - goal 120-180  on lantus and sliding scale - if FS >180, add lispro with meals    9. Hypothyroid on synthroid    10. Dyslipidemia on Crestor at home  pt allergic to lipitor    11. RA  on methotrexate 10mg po q Wed + folate 1mg po q24  Holding Prednisone 5mg po q24    12. Urinary retention  fields replaced on 6/13  Urology consult appreciated  pt finished course of abx  for UTI  on flomax 0.4mg daily  since pt will be discharged to SNF, keep fields for now  needs outpt f/u with Dr. Garibay    13. TIAGO  CPAP not in use due to claustrophobia    14. DVT prophylaxis   heparin SQ q12hr  wife brought in device for left LE  continue PT as tolerated  OOB to chair  (pt does not have prosthesis)      full code  guarded prognosis  Patient is at high risk of clinical deterioration and demise. Family aware per chart.      PROGRESS NOTE HANDOFF    Pending: monitor volume status on torsemide,  BMP in AM, discharge planning to SNF, d/w CM    Family discussion: residents updating family     Disposition: SNF

## 2023-06-15 NOTE — ADVANCED PRACTICE NURSE CONSULT - RECOMMEDATIONS
Cleanse wound to right buttock with soap and water.   Pat dry apply triad, gauze and Allevyn twice a day and prn for soiling.     Cleanse wound to left forearm with soap and water  Pat dry, apply bacitracin, Xeroform, wrap with Kerlix twice a day    Maintain pressure injury prevention.   Keep skin clean.   Maintain incontinence care.   Monitor wound for changes and notify provider   Case discussed with primary RN Cleanse wound to right buttock with soap and water.   Pat dry apply triad, gauze and Allevyn twice a day and prn for soiling.     Cleanse wound to left forearm with soap and water  Pat dry, continue to apply bacitracin as ordered, Xeroform, wrap with Kerlix twice a day    Maintain pressure injury prevention.   Keep skin clean.   Maintain incontinence care.   Monitor wound for changes and notify provider   Case discussed with primary RN Cleanse wound to right buttock with soap and water.   Pat dry apply triad, gauze and Allevyn twice a day and prn for soiling.     Cleanse wound to left forearm with soap and water  Pat dry, continue to apply bacitracin as ordered, Xeroform, wrap with Kerlix twice a day    Cleanse chronic wounds to extremities with soap and water daily  Pat dry leave open to air    Maintain pressure injury prevention.   Keep skin clean.   Maintain incontinence care.   Monitor wound for changes and notify provider   Case discussed with primary RN

## 2023-06-15 NOTE — PROGRESS NOTE ADULT - ASSESSMENT
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 He states he was lying in bed at home when he fell asleep and fell off the bed. Found to have moises and mild elevation of trop     # Downgrade from CCU; Confused- intub for airway protection 6/5; extub 6/8; was in Acute HFrEF, cardiogenic and septic shocks 2/2 ESBL UTI, was on pressors and ionotropes-resolved now    # Baseline fxn per family: pt has severe lt leg flexion contracture, which prohibits standing and ability to get prosthetic for rt leg; pt needs asst, but can help w/ transfers to WC or car; pt has not been in shower in years, just sponge baths; MS is usually "normal" (perhaps rare, slight forgetfulness); can feed self; needs asst w/ dressing; cannot do chores; family cares for him; pt, with asst, can get out of house - has ramps, uses slide board to get in and out of car, uses WC to get around      # Acute on chronic HFrEF 20%; biventricular dysfxn; dilated CM; mod MR; mod-sev TR; HTN; CAD; MI; s/p CABG; s/p stent; PAD s/p stent and rt AKA  #Cardiogenic shock s/p swan catheter and ionotrops- now resolved  - cardio and HF following  - no GDMT since BP low (pt allergic to ACEI and Entresto)  - HOLD toprol xl 25mg q24  - c/w DAPT: asa + prasugrel (pt allergic to plavix)  - daily wts, Is and Os  - Currently On RA satting well  - CXR slightly better, IVC 2cm, <50% collapsible, lactate normal  - HF following- s/p IV Lasix 40mg BID on 6/13>> switched to Torsemide 20mg qd today  - started on Hydralazine 10mg TID  - HF follow up reg BB     # Hospital delirum; poss underlying mild vasc-induced cog impairment  - Pt. intub for airway protection 6/5; extub 6/8;  - CTH noted: chr microvasc changes  - EEG -ve  - can use melatonin for sleep  - not req anti-psych    # Severe malnutrition; hypernatremia; hx of DISH causing dysphagia   -  puree + mild thick liquids, c/w supplements and MVs  - s/p Mod. barium swallow today- c/w current diet, pt. at high risk of aspiration, needs multiple swallows, monitor for weight loss and malnutrition, aspiration precautions  - Encourage oral hydration up to 1.5 L /day    #Acute Urinary retention s/p fields placement on 6/13  #Septic shock 2/2 UTI 2/2 mult strains ESBL Kleb- resolved  - ID eval appreciated- s/p abx: laquita 1gm iv q12 till 6/14   - BCx neg  - Urology eval appreciated- pt. following Dr. connelly OP, pending UDS, recommended CIC, but pt. non-compliant to that, wife agreeable to help with CIC, most likely pt. will need STR and will be dc'ed with fields    # Skin excoriations; periph neuropathy 2/2 DM and vasc  - can use bacitracin on skin  - MRSA nares +: s/p bactroban oint to nares top q12 for 5 days (6/7-6/12)  - sponge bath  - c/w neurontin 100mg po q12  - c/w Duloxetine 30mg 3x/day  - can tx pain as needed  - c/w wound care per wound care team    # MOISES on CKD3- resolved  # Anemia of chr dz plus iron def anemia  - base Cr low 1s  - peak CR 3.6, now back to baseline  - iron sat 3%; ferr 56 s/p venofer x5days  - will use oral iron as outpt  - will need OUTPT GI eval once stable as outpt for awhile    # DM T2 w/ neuropathy  - c/w diabetic DASH diet  - FS qac/hs - goal 100-180  - c/w lantu2 12 HS (on up to 44 units at home)  - SSI - might need lisp w/ meals    #Hypothyroid; anti TPO Ab+ >>c/w synthroid 25 mcg q24 and 50 on Sunday  - TSH 5.6 in 1/2023- outpt f/u    # DLD  - on crestor at home and pt allergic to lipitor- statins on hold  - Lipid panel wnl  - LFTs were prob abnl from passive congestion - LFTs improving    # Rheum Arth- c/w MTX 10mg po q wed + folate 1mg po q24; Holding Pred 5mg po q24  # bowel reg cont- PEG q12 + senna 2 HS  # TIAGO- CPAP not in use 2/2 claustrophobia  # BPH- restarted Flomax 04 on 6/13 on BID at home may increase to BID if BP allows  # Anxiety- on valium 1-2mg at home prn - hold off for now 2/2 delirium    # Activity: family brought in anti-flex contracture device; PT eval with prosthesis; OOBTC as tolerated  # DVT ppx: Hep sc q12  # GI ppx: change PPI to 40mg po q24 (not IV)  # Code Status: Full     Dispo: pt will go to SNF, pending HF f/u, pending SNF 67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 He states he was lying in bed at home when he fell asleep and fell off the bed. Found to have moises and mild elevation of trop     # Downgrade from CCU; Confused- intub for airway protection 6/5; extub 6/8; was in Acute HFrEF, cardiogenic and septic shocks 2/2 ESBL UTI, was on pressors and ionotropes-resolved now    # Baseline fxn per family: pt has severe lt leg flexion contracture, which prohibits standing and ability to get prosthetic for rt leg; pt needs asst, but can help w/ transfers to WC or car; pt has not been in shower in years, just sponge baths; MS is usually "normal" (perhaps rare, slight forgetfulness); can feed self; needs asst w/ dressing; cannot do chores; family cares for him; pt, with asst, can get out of house - has ramps, uses slide board to get in and out of car, uses WC to get around      # Acute on chronic HFrEF 20%; biventricular dysfxn; dilated CM; mod MR; mod-sev TR; HTN; CAD; MI; s/p CABG; s/p stent; PAD s/p stent and rt AKA  #Cardiogenic shock s/p swan catheter and ionotrops- now resolved  - cardio and HF following  - no GDMT since BP low (pt allergic to ACEI and Entresto)  - HOLD toprol xl 25mg q24  - c/w DAPT: asa + prasugrel (pt allergic to plavix)  - daily wts, Is and Os  - Currently On RA satting well  - CXR slightly better, IVC 2cm, <50% collapsible, lactate normal  - HF following- s/p IV Lasix 40mg BID on 6/13>> switched to Torsemide 20mg qd today  - started on Hydralazine 10mg TID  - HF follow up reg BB     # Hospital delirum; poss underlying mild vasc-induced cog impairment  - Pt. intub for airway protection 6/5; extub 6/8;  - CTH noted: chr microvasc changes  - EEG -ve  - can use melatonin for sleep  - not req anti-psych    # Severe malnutrition; hypernatremia; hx of DISH causing dysphagia   -  puree + mild thick liquids, c/w supplements and MVs  - s/p Mod. barium swallow today- c/w current diet, pt. at high risk of aspiration, needs multiple swallows, monitor for weight loss and malnutrition, aspiration precautions  - Encourage oral hydration up to 1.5 L /day    #Acute Urinary retention s/p fields placement on 6/13  #Septic shock 2/2 UTI 2/2 mult strains ESBL Kleb- resolved  - ID eval appreciated- s/p abx: laquita 1gm iv q12 till 6/14   - BCx neg  - Urology eval appreciated- pt. following Dr. connelly OP, pending UDS, recommended CIC, but pt. non-compliant to that, wife agreeable to help with CIC, most likely pt. will need STR and will be dc'ed with fields    # Skin excoriations; periph neuropathy 2/2 DM and vasc  - can use bacitracin on skin  - MRSA nares +: s/p bactroban oint to nares top q12 for 5 days (6/7-6/12)  - sponge bath  - c/w neurontin 100mg po q12  - c/w Duloxetine 30mg 3x/day  - can tx pain as needed  - c/w wound care per wound care team    # MOISES on CKD3- resolved  # Anemia of chr dz plus iron def anemia  - base Cr low 1s  - peak CR 3.6, now back to baseline  - iron sat 3%; ferr 56 s/p venofer x5days  - will use oral iron as outpt  - will need OUTPT GI eval once stable as outpt for awhile    # DM T2 w/ neuropathy  - c/w diabetic DASH diet  - FS qac/hs - goal 100-180  - c/w lantu2 12 HS (on up to 44 units at home)  - SSI - might need lisp w/ meals    #Hypothyroid; anti TPO Ab+ >>c/w synthroid 25 mcg q24 and 50 on Sunday  - TSH 5.6 in 1/2023- outpt f/u    # DLD  - on crestor at home and pt allergic to lipitor- statins on hold  - Lipid panel wnl  - LFTs were prob abnl from passive congestion - LFTs improving    # Rheum Arth- c/w MTX 10mg po q wed + folate 1mg po q24; Holding Pred 5mg po q24  # bowel reg cont- PEG q12 + senna 2 HS  # TIAGO- CPAP not in use 2/2 claustrophobia  # BPH- restarted Flomax 04 on 6/13 on BID at home may increase to BID if BP allows  # Anxiety- on valium 1-2mg at home prn - hold off for now 2/2 delirium    # Activity: family brought in anti-flex contracture device; PT eval with prosthesis; OOBTC as tolerated  # DVT ppx: Hep sc q12  # GI ppx: change PPI to 40mg po q24 (not IV)  # Code Status: Full     Dispo: pt will go to SNF, pending HF f/u, pending SNF. Patient has no behavioral issues and has been out of a rock and roller for 48 hours. 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 He states he was lying in bed at home when he fell asleep and fell off the bed. Found to have moises and mild elevation of trop     # Downgrade from CCU; Confused- intub for airway protection 6/5; extub 6/8; was in Acute HFrEF, cardiogenic and septic shocks 2/2 ESBL UTI, was on pressors and ionotropes-resolved now    # Baseline fxn per family: pt has severe lt leg flexion contracture, which prohibits standing and ability to get prosthetic for rt leg; pt needs asst, but can help w/ transfers to WC or car; pt has not been in shower in years, just sponge baths; MS is usually "normal" (perhaps rare, slight forgetfulness); can feed self; needs asst w/ dressing; cannot do chores; family cares for him; pt, with asst, can get out of house - has ramps, uses slide board to get in and out of car, uses WC to get around      # Acute on chronic HFrEF 20%; biventricular dysfxn; dilated CM; mod MR; mod-sev TR; HTN; CAD; MI; s/p CABG; s/p stent; PAD s/p stent and rt AKA  #Cardiogenic shock s/p swan catheter and ionotrops- now resolved  - cardio and HF following  - no GDMT since BP low (pt allergic to ACEI and Entresto)  - HOLD toprol xl 25mg q24  - c/w DAPT: asa + prasugrel (pt allergic to plavix)  - daily wts, Is and Os  - Currently On RA satting well  - CXR slightly better, IVC 2cm, <50% collapsible, lactate normal  - HF following- s/p IV Lasix 40mg BID on 6/13>> switched to Torsemide 20mg qd today  - c/w Hydralazine 10mg TID, started on Spironolactone 12.5mg qd  - HF follow up tomorrow for BB and diuretic dosage    # Hospital delirum; poss underlying mild vasc-induced cog impairment  - Pt. intub for airway protection 6/5; extub 6/8;  - CTH noted: chr microvasc changes  - EEG -ve  - can use melatonin for sleep  - not req anti-psych    # Severe malnutrition; hypernatremia; hx of DISH causing dysphagia   -  puree + mild thick liquids, c/w supplements and MVs  - s/p Mod. barium swallow today- c/w current diet, pt. at high risk of aspiration, needs multiple swallows, monitor for weight loss and malnutrition, aspiration precautions  - Encourage oral hydration up to 1.5 L /day    #Acute Urinary retention s/p fields placement on 6/13  #Septic shock 2/2 UTI 2/2 mult strains ESBL Kleb- resolved  - ID eval appreciated- s/p abx: laquita 1gm iv q12 till 6/14   - BCx neg  - Urology eval appreciated- pt. following Dr. connelly OP, pending UDS, recommended CIC, but pt. non-compliant to that, wife agreeable to help with CIC, most likely pt. will need STR and will be dc'ed with fields    # Skin excoriations; periph neuropathy 2/2 DM and vasc  - can use bacitracin on skin  - MRSA nares +: s/p bactroban oint to nares top q12 for 5 days (6/7-6/12)  - sponge bath  - c/w neurontin 100mg po q12  - c/w Duloxetine 30mg 3x/day  - can tx pain as needed  - c/w wound care per wound care team    # MOISES on CKD3- resolved  # Anemia of chr dz plus iron def anemia  - base Cr low 1s  - peak CR 3.6, now back to baseline  - iron sat 3%; ferr 56 s/p venofer x5days  - will use oral iron as outpt  - will need OUTPT GI eval once stable as outpt for awhile    # DM T2 w/ neuropathy  - c/w diabetic DASH diet  - FS qac/hs - goal 100-180  - c/w lantu2 12 HS (on up to 44 units at home)  - SSI - might need lisp w/ meals    #Hypothyroid; anti TPO Ab+ >>c/w synthroid 25 mcg q24 and 50 on Sunday  - TSH 5.6 in 1/2023- outpt f/u    # DLD  - on crestor at home and pt allergic to lipitor- statins on hold  - Lipid panel wnl  - LFTs were prob abnl from passive congestion - LFTs improving    # Rheum Arth- c/w MTX 10mg po q wed + folate 1mg po q24; Holding Pred 5mg po q24  # bowel reg cont- PEG q12 + senna 2 HS  # TIAGO- CPAP not in use 2/2 claustrophobia  # BPH- restarted Flomax 04 on 6/13 on BID at home may increase to BID if BP allows  # Anxiety- on valium 1-2mg at home prn - hold off for now 2/2 delirium    # Activity: family brought in anti-flex contracture device; PT eval with prosthesis; OOBTC as tolerated  # DVT ppx: Hep sc q12  # GI ppx: change PPI to 40mg po q24 (not IV)  # Code Status: Full     Dispo: pt will go to SNF, pending HF f/u, pending SNF. Patient has no behavioral issues and has been out of a rock and roller for 48 hours.

## 2023-06-16 NOTE — PROGRESS NOTE ADULT - SUBJECTIVE AND OBJECTIVE BOX
SUBJECTIVE:    Patient is a 67y old Male who presents with a chief complaint of Mechanical fall (15 Nael 2023 12:46)    Currently admitted to medicine with the primary diagnosis of Acute HF (heart failure)       Today is hospital day 29d. This morning he is resting comfortably in bed and reports no new issues or overnight events.     PAST MEDICAL & SURGICAL HISTORY  Status post percutaneous transluminal coronary angioplasty  2 stents    Atherosclerosis of coronary artery  CAD (coronary artery disease)    Osteoarthritis    HLD (hyperlipidemia)    Diabetes  on insulin pump    CHF (congestive heart failure)  EF ~ 25%    History of celiac disease    Diverticulitis    STEMI (ST elevation myocardial infarction)    Diabetic neuropathy  Hands and Feet    Anxiety and depression    Other postprocedural status  Fixation hardware in foot LEFT    Stented coronary artery  10/18 heart attack  INFERIOR WALL MI    S/P CABG x 1  2018    S/P TKR (total knee replacement), right  with infection Mrsa   per pt he was cleared from MRSA infection    Surgery, elective  right knee wound debridement    History of open reduction and internal fixation (ORIF) procedure  right hip    H/O shoulder surgery  right    AICD (automatic cardioverter/defibrillator) present  [facility]    Cholecystostomy care  drain inserted 2020 & removed 4 months ago    History of tonsillectomy    History of hip replacement, total, right    Elective surgery  plastic surgery Left shin      SOCIAL HISTORY:  Negative for smoking/alcohol/drug use.     ALLERGIES:  ACE inhibitors (Rash)  Entresto (Swelling)  Atorvastatin Calcium (Unknown)  Bactrim (Unknown)  Plavix (Unknown)    MEDICATIONS:  STANDING MEDICATIONS  aspirin enteric coated 81 milliGRAM(s) Oral daily  bacitracin   Ointment 1 Application(s) Topical every 12 hours  chlorhexidine 2% Cloths 1 Application(s) Topical daily  dextrose 5%. 1000 milliLiter(s) IV Continuous <Continuous>  dextrose 5%. 1000 milliLiter(s) IV Continuous <Continuous>  dextrose 50% Injectable 25 Gram(s) IV Push once  dextrose 50% Injectable 12.5 Gram(s) IV Push once  dextrose 50% Injectable 25 Gram(s) IV Push once  DULoxetine 30 milliGRAM(s) Oral <User Schedule>  folic acid 1 milliGRAM(s) Oral <User Schedule>  gabapentin 100 milliGRAM(s) Oral every 12 hours  glucagon  Injectable 1 milliGRAM(s) IntraMuscular once  heparin   Injectable 5000 Unit(s) SubCutaneous every 12 hours  hydrALAZINE 10 milliGRAM(s) Oral three times a day  insulin glargine Injectable (LANTUS) 12 Unit(s) SubCutaneous every morning  insulin lispro (ADMELOG) corrective regimen sliding scale   SubCutaneous three times a day before meals  levothyroxine 25 MICROGram(s) Oral <User Schedule>  levothyroxine 50 MICROGram(s) Oral <User Schedule>  multivitamin/minerals 1 Tablet(s) Oral daily  pantoprazole    Tablet 40 milliGRAM(s) Oral before breakfast  polyethylene glycol 3350 17 Gram(s) Oral two times a day  prasugrel 10 milliGRAM(s) Oral daily  senna 2 Tablet(s) Oral at bedtime  spironolactone 12.5 milliGRAM(s) Oral daily  tamsulosin 0.4 milliGRAM(s) Oral at bedtime  torsemide 20 milliGRAM(s) Oral daily    PRN MEDICATIONS  acetaminophen     Tablet .. 325 milliGRAM(s) Oral every 4 hours PRN  dextrose Oral Gel 15 Gram(s) Oral once PRN    VITALS:   T(F): 96.9  HR: 98  BP: 102/62  RR: 18  SpO2: 96%    LABS:                        11.3   7.16  )-----------( 286      ( 15 Nael 2023 12:25 )             38.1     06-15    132<L>  |  96<L>  |  33<H>  ----------------------------<  180<H>  4.5   |  27  |  1.0    Ca    8.2<L>      15 Nael 2023 12:25  Mg     1.8     06-15    TPro  5.8<L>  /  Alb  3.0<L>  /  TBili  1.1  /  DBili  x   /  AST  23  /  ALT  19  /  AlkPhos  109  06-15                    PHYSICAL EXAM:  GEN: No acute distress  LUNGS: reduced breath sounds at base Lt.>Rt.  HEART: S1/S2 present. RRR.   ABD: Soft, non-tender, non-distended. Bowel sounds present  EXT: Rt. AKA, B/L UE and LLE excoriations  NEURO: AAOX1, confused      Intravenous access:   NG tube:   Loja Catheter:   Indwelling Urethral Catheter:     Connect To:  Straight Drainage/[place]    Indication:  Urinary Retention / Obstruction (06-15-23 @ 21:18) (not performed) [Active]  Indwelling Urethral Catheter:     Connect To:  Leg Bag    Indication:  Urinary Retention / Obstruction (06-15-23 @ 09:18) (not performed) [Active]  Indwelling Urethral Catheter:     Connect To:  Leg Bag    Indication:  Urinary Retention / Obstruction (06-14-23 @ 09:41) (not performed) [Active]  Indwelling Urethral Catheter:     Connect To:  Leg Bag    Indication:  Urinary Retention / Obstruction (06-13-23 @ 13:20) (not performed) [Active]

## 2023-06-16 NOTE — PROGRESS NOTE ADULT - SUBJECTIVE AND OBJECTIVE BOX
SUBJECTIVE:    Patient is a 67y old Male who presents with a chief complaint of Mechanical fall (16 Jun 2023 10:31)    Currently admitted to medicine with the primary diagnosis of Acute HF (heart failure)       Today is hospital day 29d.     PAST MEDICAL & SURGICAL HISTORY  Status post percutaneous transluminal coronary angioplasty  2 stents    Atherosclerosis of coronary artery  CAD (coronary artery disease)    Osteoarthritis    HLD (hyperlipidemia)    Diabetes  on insulin pump    CHF (congestive heart failure)  EF ~ 25%    History of celiac disease    Diverticulitis    STEMI (ST elevation myocardial infarction)    Diabetic neuropathy  Hands and Feet    Anxiety and depression    Other postprocedural status  Fixation hardware in foot LEFT    Stented coronary artery  10/18 heart attack  INFERIOR WALL MI    S/P CABG x 1  2018    S/P TKR (total knee replacement), right  with infection Mrsa   per pt he was cleared from MRSA infection    Surgery, elective  right knee wound debridement    History of open reduction and internal fixation (ORIF) procedure  right hip    H/O shoulder surgery  right    AICD (automatic cardioverter/defibrillator) present  [facility]    Cholecystostomy care  drain inserted 2020 & removed 4 months ago    History of tonsillectomy    History of hip replacement, total, right    Elective surgery  plastic surgery Left shin      ALLERGIES:  ACE inhibitors (Rash)  Entresto (Swelling)  Atorvastatin Calcium (Unknown)  Bactrim (Unknown)  Plavix (Unknown)    MEDICATIONS:  STANDING MEDICATIONS  aspirin enteric coated 81 milliGRAM(s) Oral daily  bacitracin   Ointment 1 Application(s) Topical every 12 hours  chlorhexidine 2% Cloths 1 Application(s) Topical daily  dextrose 5%. 1000 milliLiter(s) IV Continuous <Continuous>  dextrose 5%. 1000 milliLiter(s) IV Continuous <Continuous>  dextrose 50% Injectable 25 Gram(s) IV Push once  dextrose 50% Injectable 12.5 Gram(s) IV Push once  dextrose 50% Injectable 25 Gram(s) IV Push once  DULoxetine 30 milliGRAM(s) Oral <User Schedule>  folic acid 1 milliGRAM(s) Oral <User Schedule>  gabapentin 100 milliGRAM(s) Oral every 12 hours  glucagon  Injectable 1 milliGRAM(s) IntraMuscular once  heparin   Injectable 5000 Unit(s) SubCutaneous every 12 hours  hydrALAZINE 10 milliGRAM(s) Oral three times a day  insulin glargine Injectable (LANTUS) 12 Unit(s) SubCutaneous every morning  insulin lispro (ADMELOG) corrective regimen sliding scale   SubCutaneous three times a day before meals  levothyroxine 25 MICROGram(s) Oral <User Schedule>  levothyroxine 50 MICROGram(s) Oral <User Schedule>  multivitamin/minerals 1 Tablet(s) Oral daily  pantoprazole    Tablet 40 milliGRAM(s) Oral before breakfast  polyethylene glycol 3350 17 Gram(s) Oral two times a day  prasugrel 10 milliGRAM(s) Oral daily  senna 2 Tablet(s) Oral at bedtime  spironolactone 12.5 milliGRAM(s) Oral daily  tamsulosin 0.4 milliGRAM(s) Oral at bedtime  torsemide 20 milliGRAM(s) Oral daily    PRN MEDICATIONS  acetaminophen     Tablet .. 325 milliGRAM(s) Oral every 4 hours PRN  dextrose Oral Gel 15 Gram(s) Oral once PRN    VITALS:   T(F): 96.9  HR: 98  BP: 102/62  RR: 18  SpO2: 96%    LABS:                        11.3   7.16  )-----------( 286      ( 15 Nael 2023 12:25 )             38.1     06-15    132<L>  |  96<L>  |  33<H>  ----------------------------<  180<H>  4.5   |  27  |  1.0    Ca    8.2<L>      15 Nael 2023 12:25  Mg     1.8     06-15    TPro  5.8<L>  /  Alb  3.0<L>  /  TBili  1.1  /  DBili  x   /  AST  23  /  ALT  19  /  AlkPhos  109  06-15                  RADIOLOGY:    PHYSICAL EXAM:  GEN: No acute distress  LUNGS: Clear to auscultation bilaterally   HEART: S1/S2 present. RRR.   ABD/ GI: Soft, non-tender, non-distended. Bowel sounds present  EXT: rt leg AKA  NEURO: AAOX3

## 2023-06-16 NOTE — PROGRESS NOTE ADULT - SUBJECTIVE AND OBJECTIVE BOX
Date of Admission: 23    Interval History: Patient found sitting up in bed, alert and oriented x3, in NAD. Reports feeling well, denies SOB or CP. AM labs still pending. 2.3 24hr UOP on PO diuretics.     CHIEF COMPLAINT: Patient is a 67y old  Male who presents with a chief complaint of Mechanical fall (22 May 2023 12:56)    HISTORY OF PRESENT ILLNESS: 67-year-old male w/ HFrEF 15-20% s/p ICD, decreased RV function, mild MR/TR, DM type I w/ neuropathy and hx hypoglycemia , DL, HTN, CAD, hx MI, s/p CABG, s/p stent (2018), hx in-stent thrombosis while on Plavix, PAD s/p 3 LLE stents, TIAGO (needs CPAP auto-regulating pressure 10-16, patient doesn't use it due to claustrophobia), Psoriasis, R-AKA presenting to ED s/p fall. He states he was lying in bed at home when he fell asleep and fell off the bed, and complains of generalized headache neck pain, non-radiating, no alleviating or aggravating factors, associated with right anterior chest wall pain. Denies LOC,  fevers, chills, nausea, vomiting, dizziness, abdominal pain, shortness of breath. States he had TDAP recently. Pt was not down for long as wife was upstairs when he fell off the bed and called EMS right away.   Labs significant for cr 1.8 (baseline ~ 1.2)  trop 0.10 (18 May 2023 14:16)      PAST MEDICAL & SURGICAL HISTORY:  Status post percutaneous transluminal coronary angioplasty  2 stents  Atherosclerosis of coronary artery  CAD (coronary artery disease)  Osteoarthritis  HLD (hyperlipidemia)  Diabetes  on insulin pump  CHF (congestive heart failure)  EF ~ 25%  History of celiac disease  Diverticulitis  STEMI (ST elevation myocardial infarction)  Diabetic neuropathy  Hands and Feet  Anxiety and depression  Other postprocedural status  Fixation hardware in foot LEFT  Stented coronary artery  10/18 heart attack  INFERIOR WALL MI  S/P CABG x 1  2018  S/P TKR (total knee replacement), right  with infection Mrsa  per pt he was cleared from MRSA infection  Surgery, elective  right knee wound debridement  History of open reduction and internal fixation (ORIF) procedure  right hip  H/O shoulder surgery  right  AICD (automatic cardioverter/defibrillator) present  [facility] [place]  Cholecystostomy care  drain inserted  & removed 4 months ago  History of tonsillectomy  History of hip replacement, total, right  Elective surgery  plastic surgery Left shin      FAMILY HISTORY: Patient was adopted, family history unknown to him  SOCIAL HISTORY:  Denies smoking, alcohol, or drug use      Allergies  ACE inhibitors (Rash)  Entresto (Swelling)  Atorvastatin Calcium (Unknown)  Bactrim (Unknown)  Plavix (Unknown)    Intolerances      PHYSICAL EXAM:  T(C): 36.1 (2023 06:15), Max: 36.2 (15 Nael 2023 20:31)  T(F): 96.9 (2023 06:15), Max: 97.2 (15 Nael 2023 20:31)  HR: 98 (2023 06:15) (95 - 101)  BP: 102/62 (2023 06:15) (92/52 - 110/60)  BP(mean): --  RR: 18 (2023 06:15) (18 - 18)  SpO2: 96% (2023 06:15) (95% - 96%)    O2 Parameters below as of 2023 06:15  Patient On (Oxygen Delivery Method): room air        General Appearance: awake, alert and oriented x3 (improved)  Cardiovascular: regular rate and rhythm S1 S2, No edema  Respiratory: decreased b/l  Psychiatry: awake, alert and oriented x3   Gastrointestinal:  Soft, +BS  Skin/Integumentary: No rashes, No ecchymoses, No cyanosis	  Musculoskeletal/ extremities: R AKA, No clubbing, cyanosis or edema  Vascular: Peripheral pulses palpable 2+ B/L UE         LABS:	 	    CBC Full  -  ( 15 Nael 2023 12:25 )  WBC Count : 7.16 K/uL  RBC Count : 4.95 M/uL  Hemoglobin : 11.3 g/dL  Hematocrit : 38.1 %  Platelet Count - Automated : 286 K/uL  Mean Cell Volume : 77.0 fL  Mean Cell Hemoglobin : 22.8 pg  Mean Cell Hemoglobin Concentration : 29.7 g/dL  Auto Neutrophil # : x  Auto Lymphocyte # : x  Auto Monocyte # : x  Auto Eosinophil # : x  Auto Basophil # : x  Auto Neutrophil % : x  Auto Lymphocyte % : x  Auto Monocyte % : x  Auto Eosinophil % : x  Auto Basophil % : x    Comprehensive Metabolic Panel (06.15.23 @ 12:25)    Sodium, Serum: 132 mmol/L   Potassium, Serum: 4.5 mmol/L   Chloride, Serum: 96 mmol/L   Carbon Dioxide, Serum: 27 mmol/L   Anion Gap, Serum: 9 mmol/L   Blood Urea Nitrogen, Serum: 33 mg/dL   Creatinine, Serum: 1.0 mg/dL   Glucose, Serum: 180 mg/dL   Calcium, Total Serum: 8.2 mg/dL   Protein Total, Serum: 5.8 g/dL   Albumin, Serum: 3.0 g/dL   Bilirubin Total, Serum: 1.1 mg/dL   Alkaline Phosphatase, Serum: 109 U/L   Aspartate Aminotransferase (AST/SGOT): 23 U/L   Alanine Aminotransferase (ALT/SGPT): 19 U/L   eGFR: 84: The estimated glomerular filtration rate (eGFR) is calculated using the   CKD-EPI creatinine equation, which does not have a coefficient for  race and is validated in individuals 18 years of age and older (N Engl J  Med ; [id number]). Creatinine-based eGFR may be inaccurate in  various situations including but not limited to extremes of muscle mass,  altered dietary protein intake, or medications that affect renal tubular  creatinine secretion. mL/min/1.73m2        CARDIAC MARKERS:  Pro-Brain Natriuretic Peptide: 9132 pg/mL (23 @ 07:01)        TELEMETRY EVENTS: 	    EC23    Ventricular Rate 79 BPM  Atrial Rate 79 BPM  P-R Interval 170 ms  QRS Duration 158 ms  Q-T Interval 480 ms  QTC Calculation(Bazett) 550 ms  P Axis 51 degrees  R Axis -35 degrees  T Axis 65 degrees    Diagnosis Line Atrial-sensed ventricular-paced rhythm  Abnormal ECG    Confirmed by London Santos ([id number]) on 2023 4:12:01 PM      PREVIOUS DIAGNOSTIC TESTING:    TTE 23    Summary:   1. Left ventricular ejection fraction, by visual estimation, is <20%.   2. Severely decreased global left ventricular systolic function.   3. Mildly increased LV wall thickness.   4. Severe concentric left ventricular hypertrophy.   5. Spectral Doppler shows restrictive pattern of left ventricular   myocardial filling (Grade III diastolic dysfunction).   6. Moderately enlarged right ventricle.   7. Moderately reduced RV systolic function.   8. Elevated mean left atrial pressure.   9. Moderately enlarged left atrium.  10.Moderate mitral valve regurgitation.  11. The mitral valve leaflets are tethered which is due to reduced   systolic function and elevated LVDP.  12. Moderate-severe tricuspid regurgitation.  13. Estimated pulmonary artery systolic pressure is 48.4 mmHg assuming a   right atrial pressure of 8 mmHg, which is consistent with mild pulmonary   hypertension.  14. Patient is in normal sinus rhythm.  15. Compared to the prior TTE dated 21, the patient's cardiomyopathy   has worsened.      Left Heart Catheterization: 18    IMPRESSIONS: There is significant single vessel native coronary artery  disease. Patent LIMA to LAD. No significant restenosis in RCA/OM1.    	    Home Medications:  aspirin 81 mg oral delayed release tablet: 1 tab(s) orally once a day (2023 02:35)  diazePAM 2 mg oral tablet: 0.5 tab(s) orally once a day (at bedtime) (2023 02:35)  DULoxetine 30 mg oral delayed release capsule: 1 cap(s) orally 3 times a day (:35)  insulin glargine 100 units/mL subcutaneous solution: 25 unit(s) subcutaneous once a day (at bedtime) (2023 02:35)  insulin glargine 100 units/mL subcutaneous solution: 40 microcurie subcutaneous once a day (2023 02:35)  insulin lispro 100 units/mL injectable solution: if your finger stick is 150-199 please inject 2 units  if your finger stick is 200-250 please inject 4 units  if your finger stick is 251-300 please inject 6 units  if your finger stick is 301-350 please inject 8 units  if your finger stick is more than 350 please call your physician    (2023 02:35)  levothyroxine 25 mcg (0.025 mg) oral tablet: 1 tab(s) orally 6 times a week (2023 02:35)  levothyroxine 50 mcg (0.05 mg) oral tablet: 1 tab(s) orally once a week (2023 02:35)  metoprolol succinate 25 mg oral tablet, extended release: 1 tab(s) orally once a day (2023 02:35)  Multiple Vitamins oral tablet: 1 tab(s) orally once a day (2023 02:35)  pantoprazole 40 mg oral delayed release tablet: 1 tab(s) orally once a day (before a meal) (2023 02:35)  prasugrel 10 mg oral tablet: 1 tab(s) orally once a day (2023 02:35)  rosuvastatin 40 mg oral tablet: 1 tab(s) orally once a day (2023 02:35)  spironolactone 25 mg oral tablet: 1 tab(s) orally once a day (2023 02:35)        MEDICATIONS  (STANDING):  aspirin enteric coated 81 milliGRAM(s) Oral daily  dextrose 5% + sodium chloride 0.9%. 1000 milliLiter(s) (50 mL/Hr) IV Continuous <Continuous>  dextrose 5%. 1000 milliLiter(s) (100 mL/Hr) IV Continuous <Continuous>  dextrose 5%. 1000 milliLiter(s) (50 mL/Hr) IV Continuous <Continuous>  dextrose 50% Injectable 25 Gram(s) IV Push once  dextrose 50% Injectable 12.5 Gram(s) IV Push once  dextrose 50% Injectable 25 Gram(s) IV Push once  diazepam    Tablet 1 milliGRAM(s) Oral at bedtime  DULoxetine 30 milliGRAM(s) Oral <User Schedule>  furosemide   Injectable 20 milliGRAM(s) IV Push two times a day  glucagon  Injectable 1 milliGRAM(s) IntraMuscular once  heparin   Injectable 5000 Unit(s) SubCutaneous every 12 hours  hydrocortisone 2.5% Cream 1 Application(s) Topical two times a day  insulin glargine Injectable (LANTUS) 12 Unit(s) SubCutaneous at bedtime  insulin lispro (ADMELOG) corrective regimen sliding scale   SubCutaneous three times a day before meals  lactulose Syrup 10 Gram(s) Oral at bedtime  levothyroxine 25 MICROGram(s) Oral <User Schedule>  levothyroxine 50 MICROGram(s) Oral <User Schedule>  metoprolol succinate ER 25 milliGRAM(s) Oral daily  midodrine. 10 milliGRAM(s) Oral three times a day  pantoprazole    Tablet 40 milliGRAM(s) Oral before breakfast  polyethylene glycol 3350 17 Gram(s) Oral two times a day  prasugrel 10 milliGRAM(s) Oral daily  senna 2 Tablet(s) Oral at bedtime      MEDICATIONS  (PRN):  dextrose Oral Gel 15 Gram(s) Oral once PRN Blood Glucose LESS THAN 70 milliGRAM(s)/deciliter

## 2023-06-16 NOTE — PROGRESS NOTE ADULT - ASSESSMENT
Assessment: 67-year-old male w/ HFrEF 15-20% s/p ICD, DM type I, DL, HTN, CAD, hx MI, s/p CABG, s/p stent (2018), hx in-stent thrombosis while on Plavix, PAD s/p 3 LLE stents, TIAGO non-compliant w/ CPAP, R-AKA presenting to ED s/p fall (mechanical). Patient found to be fluid overloaded with MOISES, heart failure consulted for further management of ADHF. Hospital course c/b AMS, elevated lactate with worsening renal/liver function. Patient was intubated 6/05 for airway protection, started empirically on inotropic support with good urine response + improving lactate. Patient was transferred to CCU for SWAN catheter placement for closer hemodynamic monitoring. Of note, patient also found to have +UA w/puss noted in fields bag. UCx positive for klebsiella pneumoniae ESBL- treating with IV antibiotics. Patient extubated 6/08, [place] catheter removed. Patient status improved, downgraded to floor.       Plan:  Patient is mildly fluid overloaded on exam  POCUS exam: IVC 2.2cm, < 50% collapsibility with respirations  Increase torsemide 20mg to BID (from daily) x5 days, then decrease to daily, take an extra tablet if a weight gain of 2 lbs or more in one day upon discharge   Switch hydralazine 10mg TID to Losartan 25mg daily, hold for systolic BP < 85 mmHg  Continue spironolactone 12.5mg daily  No beta blocker at this time   PT follow-up / OOB to chair   Get BMP daily with magnesium   Maintain potassium >4.0, Mg >2.2  Strict intake and output  Daily weight    Patient to follow up in outpatient heart failure clinic in 1-2 weeks from discharge   Plan discussed with primary team

## 2023-06-16 NOTE — PROGRESS NOTE ADULT - ASSESSMENT
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 He states he was lying in bed at home when he fell asleep and fell off the bed. Found to have moises and mild elevation of trop     # Downgrade from CCU; Confused- intub for airway protection 6/5; extub 6/8; was in Acute HFrEF, cardiogenic and septic shocks 2/2 ESBL UTI, was on pressors and ionotropes-resolved now    # Baseline fxn per family: pt has severe lt leg flexion contracture, which prohibits standing and ability to get prosthetic for rt leg; pt needs asst, but can help w/ transfers to WC or car; pt has not been in shower in years, just sponge baths; MS is usually "normal" (perhaps rare, slight forgetfulness); can feed self; needs asst w/ dressing; cannot do chores; family cares for him; pt, with asst, can get out of house - has ramps, uses slide board to get in and out of car, uses WC to get around      # Acute on chronic HFrEF 20%; biventricular dysfxn; dilated CM; mod MR; mod-sev TR; HTN; CAD; MI; s/p CABG; s/p stent; PAD s/p stent and rt AKA  #Cardiogenic shock s/p swan catheter and ionotrops- now resolved  - cardio and HF following  - no GDMT since BP low (pt allergic to ACEI and Entresto)  - HOLD toprol xl 25mg q24  - c/w DAPT: asa + prasugrel (pt allergic to plavix)  - daily wts, Is and Os  - Currently On RA satting well  - CXR slightly better, IVC 2cm, <50% collapsible, lactate normal  - HF following- s/p IV Lasix 40mg BID on 6/13>> switched to Torsemide 20mg qd today  - c/w Hydralazine 10mg TID, started on Spironolactone 12.5mg qd  - HF follow up tomorrow for BB and diuretic dosage  - F/u Iron profile    # Hospital delirum; poss underlying mild vasc-induced cog impairment  - Pt. intub for airway protection 6/5; extub 6/8;  - CTH noted: chr microvasc changes  - EEG -ve  - can use melatonin for sleep  - not req anti-psych    # Severe malnutrition; hypernatremia; hx of DISH causing dysphagia   -  puree + mild thick liquids, c/w supplements and MVs  - s/p Mod. barium swallow today- c/w current diet, pt. at high risk of aspiration, needs multiple swallows, monitor for weight loss and malnutrition, aspiration precautions  - Encourage oral hydration up to 1.5 L /day    #Acute Urinary retention s/p fields placement on 6/13  #Septic shock 2/2 UTI 2/2 mult strains ESBL Kleb- resolved  - ID eval appreciated- s/p abx: laqutia 1gm iv q12 till 6/14   - BCx neg  - Urology eval appreciated- pt. following Dr. connelly OP, pending UDS, recommended CIC, but pt. non-compliant to that, wife agreeable to help with CIC, most likely pt. will need STR and will be dc'ed with fields    # Skin excoriations; periph neuropathy 2/2 DM and vasc  - can use bacitracin on skin  - MRSA nares +: s/p bactroban oint to nares top q12 for 5 days (6/7-6/12)  - sponge bath  - c/w neurontin 100mg po q12  - c/w Duloxetine 30mg 3x/day  - can tx pain as needed  - c/w wound care per wound care team    # MOISES on CKD3- resolved  # Anemia of chr dz plus iron def anemia  - base Cr low 1s  - peak CR 3.6, now back to baseline  - iron sat 3%; ferr 56 s/p venofer x5days  - will use oral iron as outpt  - will need OUTPT GI eval once stable as outpt for awhile    # DM T2 w/ neuropathy  - c/w diabetic DASH diet  - FS qac/hs - goal 100-180  - c/w lantu2 12 HS (on up to 44 units at home)  - SSI - might need lisp w/ meals    #Hypothyroid; anti TPO Ab+ >>c/w synthroid 25 mcg q24 and 50 on Sunday  - TSH 5.6 in 1/2023- outpt f/u    # DLD  - on crestor at home and pt allergic to lipitor- statins on hold  - Lipid panel wnl  - LFTs were prob abnl from passive congestion - LFTs improving    # Rheum Arth- c/w MTX 10mg po q wed + folate 1mg po q24; Holding Pred 5mg po q24  # bowel reg cont- PEG q12 + senna 2 HS  # TIAGO- CPAP not in use 2/2 claustrophobia  # BPH- restarted Flomax 04 on 6/13 on BID at home may increase to BID if BP allows  # Anxiety- on valium 1-2mg at home prn - hold off for now 2/2 delirium    # Activity: family brought in anti-flex contracture device; PT eval with prosthesis; OOBTC as tolerated  # DVT ppx: Hep sc q12  # GI ppx: change PPI to 40mg po q24 (not IV)  # Code Status: Full     Dispo: pt will go to SNF, pending HF f/u, pending SNF. Patient has no behavioral issues and has been out of a rock and roller for 48 hours.   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 He states he was lying in bed at home when he fell asleep and fell off the bed. Found to have moises and mild elevation of trop     # Downgrade from CCU; Confused- intub for airway protection 6/5; extub 6/8; was in Acute HFrEF, cardiogenic and septic shocks 2/2 ESBL UTI, was on pressors and ionotropes-resolved now    # Baseline fxn per family: pt has severe lt leg flexion contracture, which prohibits standing and ability to get prosthetic for rt leg; pt needs asst, but can help w/ transfers to WC or car; pt has not been in shower in years, just sponge baths; MS is usually "normal" (perhaps rare, slight forgetfulness); can feed self; needs asst w/ dressing; cannot do chores; family cares for him; pt, with asst, can get out of house - has ramps, uses slide board to get in and out of car, uses WC to get around      # Acute on chronic HFrEF 20%; biventricular dysfxn; dilated CM; mod MR; mod-sev TR; HTN; CAD; MI; s/p CABG; s/p stent; PAD s/p stent and rt AKA  #Cardiogenic shock s/p swan catheter and ionotrops- now resolved  - cardio and HF following  - no GDMT since BP low (pt allergic to ACEI and Entresto)  - HOLD toprol xl 25mg q24  - c/w DAPT: asa + prasugrel (pt allergic to plavix)  - daily wts, Is and Os  - Currently On RA satting well  - CXR slightly better, IVC 2cm, <50% collapsible, lactate normal  - HF following- s/p IV Lasix 40mg BID on 6/13>> switched to Torsemide 20mg qd today  - started on Spironolactone 12.5mg qd  - HF recs appreciated- Torsemide 20mg BID for 5 days, then 20mg qd, take an extra tablet if a weight gain of 2 lbs or more in one day upon discharge. switched Hydralazine to Losartan 25mg qd  - IV venofer x 5days  - OP f/u with HF in 1-2 wks after dc    # Hospital delirum; poss underlying mild vasc-induced cog impairment  - Pt. intub for airway protection 6/5; extub 6/8;  - CTH noted: chr microvasc changes  - EEG -ve  - can use melatonin for sleep  - not req anti-psych    # Severe malnutrition; hypernatremia; hx of DISH causing dysphagia   -  puree + mild thick liquids, c/w supplements and MVs  - s/p Mod. barium swallow today- c/w current diet, pt. at high risk of aspiration, needs multiple swallows, monitor for weight loss and malnutrition, aspiration precautions  - Encourage oral hydration up to 1.5 L /day    #Acute Urinary retention s/p fields placement on 6/13  #Septic shock 2/2 UTI 2/2 mult strains ESBL Kleb- resolved  - ID eval appreciated- s/p abx: laquita 1gm iv q12 till 6/14   - BCx neg  - Urology eval appreciated- pt. following Dr. connelly OP, pending UDS, recommended CIC, but pt. non-compliant to that, wife agreeable to help with CIC, most likely pt. will need STR and will be dc'ed with fields    # Skin excoriations; periph neuropathy 2/2 DM and vasc  - can use bacitracin on skin  - MRSA nares +: s/p bactroban oint to nares top q12 for 5 days (6/7-6/12)  - sponge bath  - c/w neurontin 100mg po q12  - c/w Duloxetine 30mg 3x/day  - can tx pain as needed  - c/w wound care per wound care team    # MOISES on CKD3- resolved  # Anemia of chr dz plus iron def anemia  - base Cr low 1s  - peak CR 3.6, now back to baseline  - iron sat 3%; ferr 56 s/p venofer x5days  - will use oral iron as outpt  - will need OUTPT GI eval once stable as outpt for awhile    # DM T2 w/ neuropathy  - c/w diabetic DASH diet  - FS qac/hs - goal 100-180  - c/w lantu2 12 HS (on up to 44 units at home)  - SSI - might need lisp w/ meals    #Hypothyroid; anti TPO Ab+ >>c/w synthroid 25 mcg q24 and 50 on Sunday  - TSH 5.6 in 1/2023- outpt f/u    # DLD  - on crestor at home and pt allergic to lipitor- statins on hold  - Lipid panel wnl  - LFTs were prob abnl from passive congestion - LFTs improving    # Rheum Arth- c/w MTX 10mg po q wed + folate 1mg po q24; Holding Pred 5mg po q24  # bowel reg cont- PEG q12 + senna 2 HS  # TIAGO- CPAP not in use 2/2 claustrophobia  # BPH- restarted Flomax 04 on 6/13 on BID at home may increase to BID if BP allows  # Anxiety- on valium 1-2mg at home prn - hold off for now 2/2 delirium    # Activity: family brought in anti-flex contracture device; PT eval with prosthesis; OOBTC as tolerated  # DVT ppx: Hep sc q12  # GI ppx: change PPI to 40mg po q24 (not IV)  # Code Status: Full     Dispo: pt will go to SNF, pending HF f/u, pending SNF. Patient has no behavioral issues and has been out of a rock and roller for 48 hours.

## 2023-06-16 NOTE — PROGRESS NOTE ADULT - NS ATTEND AMEND GEN_ALL_CORE FT
Patient is hemodynamically stable, reports feeling well. He is overloaded on exam.     Increase torsemide to 20 mg BID for 5 days then daily    Switch hydralazine to losartan 25 mg daily   Continue spironolactone 12.5 mg daily   Hold off BB   Start iv iron sucrose 200 mg daily x5  DC planning per primary team
Patient remains fluid overloaded but responding well to diuretics. He reports feeling well. He has no labs for today.     Continue same diuretic regimen   Switch to oral diuretics on 5/29   PT follow up  Discharge planning for early next week from HF standpoint
Patient found in bed NAD, he is off inotropic and pressor support. He grew ESBL E. Coli in the Ucx, bcx is NGTD.     Continue management of sepsis   Will hold off GDMT until BP more stable  Iron profile once infection is treated
Patient found in bed in NAD in company of his wife. He is close to euvolemic on exam. BP stable 90-100s.    Switch diuretics to torsemide 20 mg daily from tomorrow   Start hydralazine 10 mg TID - Hold for SBP <85 mmHg   DC planning per primary team
Patient remains fluid overloaded on exam, he couldn't get iv diuretics yesterday due to lack of iv access.      Increase torsemide to 20 mg BID   Continue low dose BB   Wean midodrine to 5 mg TID
Patient remains critically ill, on mechanical ventilation. He failed SAT/SBT today despite being off sedation since yesterday. He is off milrinone but remains on levophed. Filling pressures remain on the lower side with normal/high CO/CI.       Keep off diuretics and inotropic support   Wean pressors as tolerated   Trial of SBT again when fully awake   Continue Abx for septic shock /ID follow up   Rest of care per CCU
Patient reports feeling better. He is responding well to diuretics, renal function has improved. BP remains stable. He is still overloaded based on POCUS assessment.     Continue diuresis as above   Monitor renal function and electrolytes   Strict I/os  Continue iv iron sucrose   Wean midodrine   PT follow up   Discussed with wife at bedside and primary team
Patient responding to iv diuretics but remains fluid overloaded. Renal function slightly better. BW c/w BAIRON.     Continue iv diuresis as above   Strict I/Os   Daily weight  Monitor renal function   Start iv iron sucrose 200 mg daily x5   Continue low dose BB   Decrease midodrine to 5 mg TID  PT evaluation   Daughter updated at bedside
Patient with AMS since yesterday, elevated lactate, worsening renal function, rising LFTs. In the context of systolic HF with LVEF <20% this is cardiogenic shock until proven otherwise. He was intubated for airway protection early morning. Patient was started empirically on inotropic support, after which he made >1L of urine and lactate decreased rapidly. RHC done in CCU at bedside showed low filling pressures with preserved CO/CI. This is on milrinone gtt at 0.125 mcg/kg/min. UA is grossly positive with pus coming from Loja catheter.     Continue inotropic support   Wean pressor support   Hold diuretics   Give fluids as needed since CVP is on lower side   Abx for sepsis/UTI   Get blood culture/Ucx   Rest of care per CCU
Patient remains intubated/sedation on pressor support, hemodynamics show low filing pressures, high CO/CI with low SVR c/w with septic shock. Lactate has normalized.     Agree with stopping sedation and SAT/SBT   Milrinone turned off this am - monitor lactate and HD this evening and tomorrow   Wean pressor support - aim for MAP >65 mmHg   Continue management of sepsis/septic shock  Prognosis guarded /palliative care follow up

## 2023-06-16 NOTE — PROGRESS NOTE ADULT - ASSESSMENT
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 He was admitted and hospital course was complicated by acute over chronic HFrEF, NSTEMI, lactic acidosis, acute respiratory failure (required intubation because he could not protect his airway), cardiogenic/ septic shock, dysphagia, delirium over possible cognitive impairment, ESBL Klebsiella UTI, MOISES over CKD and urinary retention.   Previous notes reviewed by me.    1. Acute over chronic HFrEF- EF 20%, s/p AICD   on torsemide 20mg daily and spironolactone 12.5mg daily  bblocker on hold  on hydralazine 10mg tid  < from: TTE Echo Complete w/ Contrast w/ Doppler (05.24.23 @ 10:49) >  severely decreased global left ventricular systolic function.   2. Multiple left ventricular regional wall motion abnormalities exist.   See wall motion findings.   3. LV Ejection Fraction by Wilkerson's Method with a biplane EF of 20 %.   4. Global cardiomyopathy.        no GDMT since BP is low (pt allergic to ACEI and Entresto)    2. NSTEMI  CAD s/p MI, CABG, stent  c/w DAPT: asa + prasugrel (pt allergic to plavix)    3. ESBL Klebsiella UTI - completed course of meropenem    4. Dysphagia - hx of DISH causing dysphagia - diet per S&S - pureed with mildly thick liquids - aspiration precautions  supplements as ordered    5. Delirium - mental status improved  reorient as needed  family involved    6. MOISES over CKD 3 - resolved  Cr stable at 1    7. BAIRON - s/p venofer - oral iron on discharge - outpt GI eval     8. DM with neuropathy  diabetic DASH diet  FS qac/hs - goal 120-180  on lantus and sliding scale - if FS >180, add lispro with meals    9. Hypothyroid on synthroid    10. Dyslipidemia on Crestor at home  pt allergic to lipitor    11. RA  on methotrexate 10mg po q Wed + folate 1mg po q24  Holding Prednisone 5mg po q24    12. Urinary retention  fields replaced on 6/13  Urology consult appreciated  pt finished course of abx  for UTI  on flomax 0.4mg daily  since pt will be discharged to SNF, keep fields for now-- CIC to be performed at home  needs outpt f/u with Dr. Garibay    13. TIAGO  CPAP not in use due to claustrophobia    14. DVT prophylaxis   heparin SQ q12hr  wife brought in device for left LE  continue PT as tolerated  OOB to chair  (pt does not have prosthesis)      full code  guarded prognosis  Patient is at high risk of clinical deterioration and demise. Family aware per chart.  family requesting only for [facility]-- they said cardiologist goes to this [facility].

## 2023-06-17 NOTE — SWALLOW BEDSIDE ASSESSMENT ADULT - COMMENTS
CXR 6/16/23 + Improving pulmonary venous congestion with residual left pleural   effusion. No pneumothorax

## 2023-06-17 NOTE — SWALLOW BEDSIDE ASSESSMENT ADULT - SLP PERTINENT HISTORY OF CURRENT PROBLEM
68 y/o M w/ PMHx: HFrEF s/p ICD, decreased RV function, mild MR/TR, DM type I w/ neuropathy, hypoglycemia, DLD, HTN, CAD, MI, s/p CABG, s/p stent (2018), hx in-stent thrombosis while on Plavix, PAD s/p 3 LLE stents, TIAGO (does not use CPAP machine), psoriasis, R-AKA, presenting to ED s/p fall. Trauma w/u (-). Pt is being treated for moderate to large b/l pleural effusion and moderate abdominopelvic ascites, managed for acute on chronic HF exacerbation. Course c/b AMS, intubated for airway protection 6/5 and has been monitored in CCU for mixed cardiogenic/septic shock, AHRF, and ESBL Klebsiella UTI. Pt s/p extubation 6/8. CT cervical spine findings significant for multilevel anterior bridging osteophytes compatible with DISH, and ossification of the posterior longitudinal ligamentous from C4 through C6 contributing to spinal stenosis.
Pt is a 68 y/o M w/ PMHx: HFrEF s/p ICD, decreased RV function, mild MR/TR, DM type I w/ neuropathy, hypoglycemia, DLD, HTN, CAD, MI, s/p CABG, s/p stent (2018), hx in-stent thrombosis while on Plavix, PAD s/p 3 LLE stents, TIAGO (does not use CPAP machine), psoriasis, R-AKA, presenting to ED s/p fall. Trauma w/u (-). Pt is being treated for moderate to large b/l pleural effusion and moderate abdominopelvic ascites, managed for acute on chronic HF exacerbation. Course c/b AMS, intubated for airway protection 6/5 and has been monitored in CCU for mixed cardiogenic/septic shock, AHRF, and ESBL Klebsiella UTI. Pt s/p extubation 6/8. CT cervical spine findings significant for multilevel anterior bridging osteophytes compatible with DISH, and ossification of the posterior longitudinal ligamentous from C4 through C6 contributing to spinal stenosis.
Pt is a 68 y/o M w/ PMHx: HFrEF s/p ICD, decreased RV function, mild MR/TR, DM type I w/ neuropathy, hypoglycemia, DLD, HTN, CAD, MI, s/p CABG, s/p stent (2018), hx in-stent thrombosis while on Plavix, PAD s/p 3 LLE stents, TIAGO (does not use CPAP machine), psoriasis, R-AKA, presenting to ED s/p fall. Trauma w/u (-). Pt is being treated for moderate to large b/l pleural effusion and moderate abdominopelvic ascites, managed for acute on chronic HF exacerbation. Course c/b AMS, intubated for airway protection 6/5 and has been monitored in CCU for mixed cardiogenic/septic shock, AHRF, and ESBL Klebsiella UTI. Pt s/p extubation 6/8. CT cervical spine findings significant for multilevel anterior bridging osteophytes compatible with DISH, and ossification of the posterior longitudinal ligamentous from C4 through C6 contributing to spinal stenosis.
68yo Male with PMH HFrEF 15-20% s/p ICD, decreased RV function, mild MR/TR, DM type I w/ neuropathy, hx hypoglycemia , DLD, HTN, CAD, hx MI, s/p CABG, s/p stent (2018), hx in-stent thrombosis while on Plavix, PAD s/p 3 LLE stents, TIAGO, Psoriasis, R-AKA. Presenting to ED s/p fall admitted for trauma w/u c/b acute hypoxic respiratory failure 2/2 septic vs cardiogenic shock. Intubated 6/5 for airway protection, s/p extubation 6/8. CTH negative, CXR negative for opacities.

## 2023-06-17 NOTE — PROGRESS NOTE ADULT - ATTENDING COMMENTS
66 yo M PMHx chronic HFrEF 15-20% s/p ICD, decreased RV function, mild MR/TR, DM type I w/ neuropathy and hx hypoglycemia, DLD, HTN, CAD, hx MI, s/p CABG, s/p stent (2018), hx in-stent thrombosis while on Plavix, PAD s/p 3 LLE stents, TIAGO (needs CPAP auto-regulating pressure 10-16, patient doesn't use it due to claustrophobia), Psoriasis, R-AKA presenting to ED s/p fall. He states he was lying in bed at home when he fell asleep and fell off the bed. Found to have moises and mild elevation of trop     A/P:   Acute on Chronic HFrEF:   Patient with SOB, Pro-BNP 9100  CT chest and abdomen showed moderate tp large bilateral pleural effusions, right greater than left. Small to moderate volume abdominopelvic ascites.  CXR 5/29 is still with bilateral pleural effusion and interstitial edema.   Echo 5/24/23 showed LVEF <20%, mod MR, mod to severe TR.   s/p Bumex 2mg IV BID and Hypertonic saline 3% BID. Start on Torsemide 20mg daily on 5/29 per heart failure team.   Continue Metoprolol. On Midodrine, wean off   History of anaphylactic reaction" to ACE-I (presumably angioedema?) and has allergy to Entresto  Low sodium diet, fluid restriction 1.2L/day,     Delirium: likely from hospital stay and medical problem  Seen by psych, stable.     CAD s/p PCI  Type 2 NSTEMI due to demand ischemia:   Troponin 0.09>0.10  EKG showed paced rhythm.   Continue Aspirin, Prasugrel, Metoprolol, add Lipitor 40mg po daily.    Mechanical Fall   No CT evidence for acute traumatic injury within the chest, abdomen or pelvis  Fall Precaution, PT follow up.     MOISES on CKD III  likely cardiorenal vs obstruction.   Cr increased to 2.6, now improving to 1.2, baseline 1.1  Kidney US:  showed Mild left hydronephrosis/dilated pelvis, unchanged. Left lower pole 0.5 cm nonobstructive calculus.  s/p Loja placement.     Iron deficiency anemia:   Iron studies reviewed Iron level 13, Iron sat 3%  Continue Venofer 200mg IV daily for 5 days.      Transaminitis  Mild elevation, AST/ALT< resolved, likely congestive hepatopathy.     DM type 1 complicated by hypoglycemia:  episodes of hypoglycemia at home  Continue Lantus and insulin sliding scale.     Hypothyroidism: continue Synthroid    Recent dx of RA   Cont Methotrexate weekly.     DVT prophylaxis: Heparin SC.   full code    Very poor prognosis, advanced heart failure, recurrent admission, palliative follow up.   #Progress Note Handoff:  Pending (specify):  improving volume overload, heart failure follow up.   Family discussion: with his wife, update about echo result,   Disposition: Home vs SNF.  5/31: Pending Heart failure team f/u. Still gets SOB when talking. No crackles. Decreased BS at bases. No LE edema. Orthopnea +. Can check D-dimer (Pre-test probability for VTE low though)   Also need to attempt bed to wheel chair transfer eventually to evaluate whether he is back to his baseline.
CAD s/p CABG s/p PCI  ICM s/p AICD  Chronic Systolic CHF    Complicated DM.  Sepsis secondary to UTI (gram negative rods)  Combined septic / cardiogenic shock (improving)    Lethargic / hypernatremic  Stable CO off inotrope.  Renal function improving.    - Wean Levophed as tolerated  - Cont Cefepime / follow-up sensitivities  - Free water / correct hypernatremia  - Minimize sedation  - SBT when mental status improved
MOISES on CKD 3b improving / FENa c/w CRS / lasix dosing should be twice daily  ADHF needs cardio f/u  Hypotension on midodrine
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 He states he was lying in bed at home when he fell asleep and fell off the bed. Found to have moises and mild elevation of trop   patient hospitalization was complicated with cardiogenic and septic shock requiring treatment in CCU. Patient downgraded to floor once stable    # Downgrade from CC  - intubated for airway protection 6/5; extub 6/8; was in Acute HFrEF, cardiogenic and septic shocks 2/2 ESBL UTI, was on pressors and ionotropes-resolved now    # Acute on chronic HFrEF 20%; biventricular dysfxn; dilated CM; mod MR; mod-sev TR; HTN; CAD; MI; s/p CABG; s/p stent; PAD s/p stent and rt AKA  #Cardiogenic shock s/p swan catheter and ionotrops- now resolved  - cardio and HF following  - no GDMT since BP low (pt allergic to ACEI and Entresto)  - HOLD toprol xl 25mg q24  - c/w DAPT: asa + prasugrel (pt allergic to plavix)  - daily wts, Is and Os  - HF following- s/p IV Lasix 40mg BID on 6/13  - HF recs appreciated- Torsemide 20mg BID for 5 days, then 20mg qd, take an extra tablet if a weight gain of 2 lbs or more in one day upon discharge. switched Hydralazine to Losartan 25mg qd  - started on Spironolactone 12.5mg qd  - IV venofer x 5days  - OP f/u with HF in 1-2 wks after dc    - hold losartan for hypotension  continue torsemide, aldactone    #  hypotension - related to cardiomyopathy- asymptomatic  monitor   hold losartan  adjust meds as tolerated  patient A&OX3; no peripheral cyanosis; monitor    # diarrhea- Held laxatives    # Severe malnutrition  #  dysphagia   d/w speech therapy- advance diet per recommendation; monitor for signs of aspiration    # Hospital delirum;resolved    #Acute Urinary retention s/p fields placement on 6/13  #Septic shock 2/2 UTI 2/2 mult strains ESBL Kleb- resolved  - ID eval appreciated- s/p abx: laquita 1gm iv q12 till 6/14   - BCx neg  - Urology eval appreciated- pt. following Dr. connelly OP,  recommended CIC, but pt. non-compliant to that, wife agreeable to help with CIC, most likely pt. will need STR and will be dc'ed with fields    # Skin excoriations; periph neuropathy 2/2 DM and vasc  - bacitracin topical  - MRSA nares +: s/p bactroban oint to nares top q12 for 5 days (6/7-6/12)  - sponge bath  - c/w neurontin 100mg po q12  - c/w Duloxetine 30mg 3x/day  - can tx pain as needed  - c/w wound care per wound care team    # MOISES on CKD3- resolved  # Anemia of chr dz plus iron def anemia  - iron sat 3%; ferr 56 s/p venofer x5days  - will use oral iron as outpt  - will need OUTPT GI eval once stable as outpt for awhile    # DM T2 w/ neuropathy  - c/w diabetic DASH diet  - FS qac/hs - goal 100-180  - decrease lantus to 8 units   - SSI     #Hypothyroid; anti TPO Ab+ >>c/w synthroid 25 mcg q24 and 50 on Sunday  - TSH 5.6 in 1/2023- outpt f/u    # DLD  - on crestor at home and pt allergic to lipitor- statins on hold  - Lipid panel wnl  - LFTs were prob abnl from hepatic congestion - LFTs improving    # Rheum Arth- c/w MTX 10mg po q wed + folate 1mg po q24; Holding Pred 5mg po q24  # TIAGO- CPAP not in use 2/2 claustrophobia  # BPH- restarted Flomax 04 on 6/13 on BID at home may increase to BID if BP allows  # Anxiety- on valium 1-2mg at home prn - hold off for now 2/2 delirium    # Activity: PT to follow    # DVT ppx: Hep sc q12  # GI ppx: change PPI to 40mg po q24   # Code Status: Full    Pending: monitor diarrhea episodes  discharge plan to rehab
CAD s/p CABG s/p PCI  ICM s/p AICD  Chronic Systolic CHF    Complicated DM.  Sepsis secondary to UTI (ESBL Klebsiella)  Combined septic / cardiogenic shock (resolved)    More alert  Hemodynamics / renal function stable  Hypernatremia    - Complete Meropenem course  - D5W / correct hypernatremia  - Low-dose beta-blocker  - Barium swallow as recommended (failed speech / swallow)
CAD s/p CABG s/p PCI  ICM s/p AICD  Chronic Systolic CHF    Complicated DM.  Sepsis secondary to UTI (ESBL Klebsiella)  Combined septic / cardiogenic shock (resolved)    Extubated  Hemodynamics stable  Hypernatremia    - Change Cefepime to Meropenem per ID  - D5W / correct hypernatremia  - Remove lines
CAD s/p CABG s/p PCI  ICM s/p AICD  Chronic Systolic CHF    Multiple comorbidities as above. Notably complicated DM.    UTI (positive / purulent urine in Loja)  Combined septic / cardiogenic shock (improving)    Intravascularly deplete.  Adequate CO / lactate normal    - Stop Milrinone  - Wean Levophed as tolerated  - Cont broad spectrum abx pending cultures  - Minimize sedation / SBT in morning

## 2023-06-17 NOTE — SWALLOW BEDSIDE ASSESSMENT ADULT - NS ASR SWALLOW FINDINGS DISCUS
Physician/Nursing/Patient/Family
RN MD Zahra made aware./Physician/Nursing
RN Mynor sussy Alfaro/Physician/Nursing/Patient/Family
NICANOR Amato/Physician/Nursing/Patient

## 2023-06-17 NOTE — SWALLOW BEDSIDE ASSESSMENT ADULT - SWALLOW EVAL: DIAGNOSIS
PO trials not given 2' poor mentation, aphonia; pt w/ increased risk for aspiration
suspected pharyngeal dysphagia; min overts noted for puree with small cup sips of mildly thick liquids. pt warrants Modified barium swallow to further investigate pharyngeal function
Pt with improved A-P transport, mastication, and timeliness of swallow initiation observed at bedside. Pt is awake, alert, able to produce volitional cough and state his desire for diet upgrade. + toleration of thin liquids via consecutive cup and straw sip, puree, and easy to chew textures at bedside.
+ grossly overt s/s of aspiration with small amounts of thin liquids, + overt s/s of aspiration with mildly thick liquids. Further PO trials discontinued 2' high aspiration risk. FEES instrumental swallow study is indicated to further assess swallow physiology prior to diet advancement.

## 2023-06-17 NOTE — SWALLOW BEDSIDE ASSESSMENT ADULT - SWALLOW EVAL: PATIENT/FAMILY GOALS STATEMENT
Pt educated on results of VFSS that revealed Pt was with silent aspiration of thin liquids. Pt and SLP in agreement that VFSS was completed shortly after post extubation. Pt educated on s/s of aspiration/ penetration to be aware of ( i.e. change in respiratory status, increased coughing/ throat clearing etc.) Pt expressed understanding and still wants diet upgrade with known risks.,

## 2023-06-17 NOTE — PROGRESS NOTE ADULT - ASSESSMENT
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 He states he was lying in bed at home when he fell asleep and fell off the bed. Found to have moises and mild elevation of trop     # Downgrade from CCU; Confused- intub for airway protection 6/5; extub 6/8; was in Acute HFrEF, cardiogenic and septic shocks 2/2 ESBL UTI, was on pressors and ionotropes-resolved now    # Baseline fxn per family: pt has severe lt leg flexion contracture, which prohibits standing and ability to get prosthetic for rt leg; pt needs asst, but can help w/ transfers to WC or car; pt has not been in shower in years, just sponge baths; MS is usually "normal" (perhaps rare, slight forgetfulness); can feed self; needs asst w/ dressing; cannot do chores; family cares for him; pt, with asst, can get out of house - has ramps, uses slide board to get in and out of car, uses WC to get around      # Acute on chronic HFrEF 20%; biventricular dysfxn; dilated CM; mod MR; mod-sev TR; HTN; CAD; MI; s/p CABG; s/p stent; PAD s/p stent and rt AKA  #Cardiogenic shock s/p swan catheter and ionotrops- now resolved  - cardio and HF following  - no GDMT since BP low (pt allergic to ACEI and Entresto)  - HOLD toprol xl 25mg q24  - c/w DAPT: asa + prasugrel (pt allergic to plavix)  - daily wts, Is and Os  - Currently On RA satting well  - CXR slightly better, IVC 2cm, <50% collapsible, lactate normal  - HF following- s/p IV Lasix 40mg BID on 6/13>> switched to Torsemide 20mg qd today  - started on Spironolactone 12.5mg qd  - HF recs appreciated- Torsemide 20mg BID for 5 days, then 20mg qd, take an extra tablet if a weight gain of 2 lbs or more in one day upon discharge. switched Hydralazine to Losartan 25mg qd  - IV venofer x 5days  - OP f/u with HF in 1-2 wks after dc    # Hospital delirum; poss underlying mild vasc-induced cog impairment  - Pt. intub for airway protection 6/5; extub 6/8;  - CTH noted: chr microvasc changes  - EEG -ve  - can use melatonin for sleep  - not req anti-psych    # Severe malnutrition; hypernatremia; hx of DISH causing dysphagia   -  puree + mild thick liquids, c/w supplements and MVs  - s/p Mod. barium swallow today- c/w current diet, pt. at high risk of aspiration, needs multiple swallows, monitor for weight loss and malnutrition, aspiration precautions  - Encourage oral hydration up to 1.5 L /day    #Acute Urinary retention s/p fields placement on 6/13  #Septic shock 2/2 UTI 2/2 mult strains ESBL Kleb- resolved  - ID eval appreciated- s/p abx: laquita 1gm iv q12 till 6/14   - BCx neg  - Urology eval appreciated- pt. following Dr. connelly OP, pending UDS, recommended CIC, but pt. non-compliant to that, wife agreeable to help with CIC, most likely pt. will need STR and will be dc'ed with fields    # Skin excoriations; periph neuropathy 2/2 DM and vasc  - can use bacitracin on skin  - MRSA nares +: s/p bactroban oint to nares top q12 for 5 days (6/7-6/12)  - sponge bath  - c/w neurontin 100mg po q12  - c/w Duloxetine 30mg 3x/day  - can tx pain as needed  - c/w wound care per wound care team    # MOISES on CKD3- resolved  # Anemia of chr dz plus iron def anemia  - base Cr low 1s  - peak CR 3.6, now back to baseline  - iron sat 3%; ferr 56 s/p venofer x5days  - will use oral iron as outpt  - will need OUTPT GI eval once stable as outpt for awhile    # DM T2 w/ neuropathy  - c/w diabetic DASH diet  - FS qac/hs - goal 100-180  - c/w lantu2 12 HS (on up to 44 units at home)  - SSI - might need lisp w/ meals    #Hypothyroid; anti TPO Ab+ >>c/w synthroid 25 mcg q24 and 50 on Sunday  - TSH 5.6 in 1/2023- outpt f/u    # DLD  - on crestor at home and pt allergic to lipitor- statins on hold  - Lipid panel wnl  - LFTs were prob abnl from passive congestion - LFTs improving    # Rheum Arth- c/w MTX 10mg po q wed + folate 1mg po q24; Holding Pred 5mg po q24  # bowel reg cont- PEG q12 + senna 2 HS  # TIAGO- CPAP not in use 2/2 claustrophobia  # BPH- restarted Flomax 04 on 6/13 on BID at home may increase to BID if BP allows  # Anxiety- on valium 1-2mg at home prn - hold off for now 2/2 delirium    # Activity: family brought in anti-flex contracture device; PT eval with prosthesis; OOBTC as tolerated  # DVT ppx: Hep sc q12  # GI ppx: change PPI to 40mg po q24 (not IV)  # Code Status: Full     Dispo: pt will go to SNF, pending SNF. Patient has no behavioral issues and has been out of a rock and roller for 48 hours. 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 He states he was lying in bed at home when he fell asleep and fell off the bed. Found to have moises and mild elevation of trop     # Downgrade from CCU; Confused- intub for airway protection 6/5; extub 6/8; was in Acute HFrEF, cardiogenic and septic shocks 2/2 ESBL UTI, was on pressors and ionotropes-resolved now    # Baseline fxn per family: pt has severe lt leg flexion contracture, which prohibits standing and ability to get prosthetic for rt leg; pt needs asst, but can help w/ transfers to WC or car; pt has not been in shower in years, just sponge baths; MS is usually "normal" (perhaps rare, slight forgetfulness); can feed self; needs asst w/ dressing; cannot do chores; family cares for him; pt, with asst, can get out of house - has ramps, uses slide board to get in and out of car, uses WC to get around      # Acute on chronic HFrEF 20%; biventricular dysfxn; dilated CM; mod MR; mod-sev TR; HTN; CAD; MI; s/p CABG; s/p stent; PAD s/p stent and rt AKA  #Cardiogenic shock s/p swan catheter and ionotrops- now resolved  - cardio and HF following  - no GDMT since BP low (pt allergic to ACEI and Entresto)  - HOLD toprol xl 25mg q24  - c/w DAPT: asa + prasugrel (pt allergic to plavix)  - daily wts, Is and Os  - Currently On RA satting well  - CXR slightly better, IVC 2cm, <50% collapsible, lactate normal  - HF following- s/p IV Lasix 40mg BID on 6/13>> switched to Torsemide 20mg qd today  - started on Spironolactone 12.5mg qd  - HF recs appreciated- Torsemide 20mg BID for 5 days, then 20mg qd, take an extra tablet if a weight gain of 2 lbs or more in one day upon discharge. switched Hydralazine to Losartan 25mg qd  - IV venofer x 5days  - OP f/u with HF in 1-2 wks after dc    # Hospital delirum; poss underlying mild vasc-induced cog impairment  - Pt. intub for airway protection 6/5; extub 6/8;  - CTH noted: chr microvasc changes  - EEG -ve  - can use melatonin for sleep  - not req anti-psych    # Severe malnutrition; hypernatremia; hx of DISH causing dysphagia   -  puree + mild thick liquids, c/w supplements and MVs  - s/p Mod. barium swallow today- c/w current diet, pt. at high risk of aspiration, needs multiple swallows, monitor for weight loss and malnutrition, aspiration precautions  - Encourage oral hydration up to 1.5 L /day    #Acute Urinary retention s/p fields placement on 6/13  #Septic shock 2/2 UTI 2/2 mult strains ESBL Kleb- resolved  - ID eval appreciated- s/p abx: laquita 1gm iv q12 till 6/14   - BCx neg  - Urology eval appreciated- pt. following Dr. connelly OP, pending UDS, recommended CIC, but pt. non-compliant to that, wife agreeable to help with CIC, most likely pt. will need STR and will be dc'ed with fields    # Skin excoriations; periph neuropathy 2/2 DM and vasc  - can use bacitracin on skin  - MRSA nares +: s/p bactroban oint to nares top q12 for 5 days (6/7-6/12)  - sponge bath  - c/w neurontin 100mg po q12  - c/w Duloxetine 30mg 3x/day  - can tx pain as needed  - c/w wound care per wound care team    # MOISES on CKD3- resolved  # Anemia of chr dz plus iron def anemia  - base Cr low 1s  - peak CR 3.6, now back to baseline  - iron sat 3%; ferr 56 s/p venofer x5days  - will use oral iron as outpt  - will need OUTPT GI eval once stable as outpt for awhile    # DM T2 w/ neuropathy  - c/w diabetic DASH diet  - FS qac/hs - goal 100-180  - c/w lantu2 12 HS (on up to 44 units at home)  - SSI - might need lisp w/ meals    #Hypothyroid; anti TPO Ab+ >>c/w synthroid 25 mcg q24 and 50 on Sunday  - TSH 5.6 in 1/2023- outpt f/u    # DLD  - on crestor at home and pt allergic to lipitor- statins on hold  - Lipid panel wnl  - LFTs were prob abnl from passive congestion - LFTs improving    # Rheum Arth- c/w MTX 10mg po q wed + folate 1mg po q24; Holding Pred 5mg po q24  # bowel reg cont- Held PEG q12 + senna 2 HS, for diarrhoea  # TIAGO- CPAP not in use 2/2 claustrophobia  # BPH- restarted Flomax 04 on 6/13 on BID at home may increase to BID if BP allows  # Anxiety- on valium 1-2mg at home prn - hold off for now 2/2 delirium    # Activity: family brought in anti-flex contracture device; PT eval with prosthesis; OOBTC as tolerated  # DVT ppx: Hep sc q12  # GI ppx: change PPI to 40mg po q24 (not IV)  # Code Status: Full     Dispo: Diarrhoea, S&S re-eval, pt will go to SNF, pending SNF. Patient has no behavioral issues and has been out of a rock and roller for 48 hours.

## 2023-06-17 NOTE — SWALLOW BEDSIDE ASSESSMENT ADULT - H & P REVIEW
Pt known to [facility] dept, VFSS completed on 6/12/23- recs puree and mildly thick liquids , 1:1 feed, small bites/sips, multiple swallows, crush meds, oral hygiene , alternate food/drink/yes
yes

## 2023-06-17 NOTE — SWALLOW BEDSIDE ASSESSMENT ADULT - ASR SWALLOW RECOMMEND DIAG
pt not currently a candidate for instrumental swallow study 2' decreased mental status
FEES
MBS warranted/VFSS/MBS
re-instrumentation recommended if Pt has change in status post diet upgrade.

## 2023-06-17 NOTE — SWALLOW BEDSIDE ASSESSMENT ADULT - SLP GENERAL OBSERVATIONS
Pt. received awake, +generalized weakness
pt received in bed awake confused w/o c/o pain. +room air +aphonia +pts daughter Laura contacted, she reports pt normally A+Ox4, oftentimes becomes confused and delirious when hospitalized
pt received in bed awake confused w/o c/o pain. +room air dysphonic vocal quality. pt oriented to self and year not place
Pt awake in bed, o2 via RA,

## 2023-06-17 NOTE — SWALLOW BEDSIDE ASSESSMENT ADULT - SWALLOW EVAL: RECOMMENDED DIET
puree with mildly thick liquids via small controlled cup sips
continue NPO w/ non-oral means of nutrition/hydration
NPO with alternate means for nutrition/ hydration
easy to chew and thin liquids

## 2023-06-17 NOTE — SWALLOW BEDSIDE ASSESSMENT ADULT - NS SPL SWALLOW CLINIC TRIAL FT
suspected pharyngeal dysphagia
+ grossly overt s/s of aspiration with small amounts of thin liquids, + overt s/s of aspiration with mildly thick liquids. Further PO trials discontinued 2' high aspiration risk.
tolerated
- Pleural effusion seen on CXR and CTA most likely in the setting of PNA  - patient satting well on room air, no need for thoracentesis at this time  - continue to monitor O2 sats
- Pleural effusion seen on CXR and CTA most likely in the setting of PNA  - patient satting well on room air, no need for urgent thoracentesis at this time  - pulm consult in am for possible thoracentesis   - continue to monitor O2 sats
- Pleural effusion seen on CXR and CTA most likely in the setting of PNA  - patient satting well on room air, no need for urgent thoracentesis at this time  - Pulm consult in am for possible diagnostic and therapeutic thoracentesis   -  monitor O2 sats

## 2023-06-17 NOTE — SWALLOW BEDSIDE ASSESSMENT ADULT - SWALLOW EVAL: CURRENT DIET
NPO
pureed and mildly thick liquids
NPO (pt pulled out NGT per Medical Resident)
NPO (pt pulled out NGT per Medical Resident)

## 2023-06-17 NOTE — PROGRESS NOTE ADULT - SUBJECTIVE AND OBJECTIVE BOX
SUBJECTIVE:    Patient is a 67y old Male who presents with a chief complaint of Mechanical fall (16 Jun 2023 12:12)    Currently admitted to medicine with the primary diagnosis of Acute HF (heart failure)       Today is hospital day 30d. This morning he is resting comfortably in bed and reports no new issues or overnight events.     PAST MEDICAL & SURGICAL HISTORY  Status post percutaneous transluminal coronary angioplasty  2 stents    Atherosclerosis of coronary artery  CAD (coronary artery disease)    Osteoarthritis    HLD (hyperlipidemia)    Diabetes  on insulin pump    CHF (congestive heart failure)  EF ~ 25%    History of celiac disease    Diverticulitis    STEMI (ST elevation myocardial infarction)    Diabetic neuropathy  Hands and Feet    Anxiety and depression    Other postprocedural status  Fixation hardware in foot LEFT    Stented coronary artery  10/18 heart attack  INFERIOR WALL MI    S/P CABG x 1  2018    S/P TKR (total knee replacement), right  with infection Mrsa   per pt he was cleared from MRSA infection    Surgery, elective  right knee wound debridement    History of open reduction and internal fixation (ORIF) procedure  right hip    H/O shoulder surgery  right    AICD (automatic cardioverter/defibrillator) present  [facility]    Cholecystostomy care  drain inserted 2020 & removed 4 months ago    History of tonsillectomy    History of hip replacement, total, right    Elective surgery  plastic surgery Left shin      SOCIAL HISTORY:  Negative for smoking/alcohol/drug use.     ALLERGIES:  ACE inhibitors (Rash)  Entresto (Swelling)  Atorvastatin Calcium (Unknown)  Bactrim (Unknown)  Plavix (Unknown)    MEDICATIONS:  STANDING MEDICATIONS  aspirin enteric coated 81 milliGRAM(s) Oral daily  bacitracin   Ointment 1 Application(s) Topical every 12 hours  chlorhexidine 2% Cloths 1 Application(s) Topical daily  dextrose 5%. 1000 milliLiter(s) IV Continuous <Continuous>  dextrose 5%. 1000 milliLiter(s) IV Continuous <Continuous>  dextrose 50% Injectable 25 Gram(s) IV Push once  dextrose 50% Injectable 25 Gram(s) IV Push once  dextrose 50% Injectable 12.5 Gram(s) IV Push once  DULoxetine 30 milliGRAM(s) Oral <User Schedule>  folic acid 1 milliGRAM(s) Oral <User Schedule>  gabapentin 100 milliGRAM(s) Oral every 12 hours  glucagon  Injectable 1 milliGRAM(s) IntraMuscular once  heparin   Injectable 5000 Unit(s) SubCutaneous every 12 hours  insulin glargine Injectable (LANTUS) 12 Unit(s) SubCutaneous every morning  insulin lispro (ADMELOG) corrective regimen sliding scale   SubCutaneous three times a day before meals  iron sucrose IVPB 200 milliGRAM(s) IV Intermittent every 24 hours  levothyroxine 25 MICROGram(s) Oral <User Schedule>  levothyroxine 50 MICROGram(s) Oral <User Schedule>  losartan 25 milliGRAM(s) Oral daily  multivitamin/minerals 1 Tablet(s) Oral daily  pantoprazole    Tablet 40 milliGRAM(s) Oral before breakfast  polyethylene glycol 3350 17 Gram(s) Oral two times a day  prasugrel 10 milliGRAM(s) Oral daily  senna 2 Tablet(s) Oral at bedtime  spironolactone 12.5 milliGRAM(s) Oral daily  tamsulosin 0.4 milliGRAM(s) Oral at bedtime  torsemide 20 milliGRAM(s) Oral every 12 hours    PRN MEDICATIONS  acetaminophen     Tablet .. 325 milliGRAM(s) Oral every 4 hours PRN  dextrose Oral Gel 15 Gram(s) Oral once PRN    VITALS:   T(F): 97  HR: 80  BP: 86/52  RR: 18  SpO2: 97%    LABS:                        11.3   7.16  )-----------( 286      ( 15 Nael 2023 12:25 )             38.1     06-15    132<L>  |  96<L>  |  33<H>  ----------------------------<  180<H>  4.5   |  27  |  1.0    Ca    8.2<L>      15 Nael 2023 12:25  Mg     1.8     06-15    TPro  5.8<L>  /  Alb  3.0<L>  /  TBili  1.1  /  DBili  x   /  AST  23  /  ALT  19  /  AlkPhos  109  06-15                      PHYSICAL EXAM:  GEN: No acute distress  LUNGS: reduced breath sounds at base Lt.>Rt.  HEART: S1/S2 present. RRR.   ABD: Soft, non-tender, non-distended. Bowel sounds present  EXT: Rt. AKA, B/L UE and LLE excoriations  NEURO: AAOX1, confused      Indwelling Urethral Catheter:     Connect To:  Leg Bag    Indication:  Urinary Retention / Obstruction (06-16-23 @ 20:22) (not performed) [Active]  Indwelling Urethral Catheter:     Connect To:  Straight Drainage/[place]    Indication:  Urinary Retention / Obstruction (06-15-23 @ 21:18) (not performed) [Active]  Indwelling Urethral Catheter:     Connect To:  Leg Bag    Indication:  Urinary Retention / Obstruction (06-14-23 @ 09:41) (not performed) [Active]  Indwelling Urethral Catheter:     Connect To:  Leg Bag    Indication:  Urinary Retention / Obstruction (06-13-23 @ 13:20) (not performed) [Active]

## 2023-06-18 NOTE — PROGRESS NOTE ADULT - SUBJECTIVE AND OBJECTIVE BOX
HPI  Patient is a 67y old Male who presents with a chief complaint of Mechanical fall (17 Jun 2023 06:48)    Currently admitted to medicine with the primary diagnosis of Acute HF (heart failure)       Today is hospital day 31d.     INTERVAL HPI / OVERNIGHT EVENTS:  Patient was seen and examined at bedside  Patient Feels okay  No new complaints  Denies any complains of chest pain or shortness of breath  Denies any abdominal pain/nausea/vomiting    would like to give a trial of void  would like to go home; but understands he needs rehab        PAST MEDICAL & SURGICAL HISTORY  Status post percutaneous transluminal coronary angioplasty  2 stents    Atherosclerosis of coronary artery  CAD (coronary artery disease)    Osteoarthritis    HLD (hyperlipidemia)    Diabetes  on insulin pump    CHF (congestive heart failure)  EF ~ 25%    History of celiac disease    Diverticulitis    STEMI (ST elevation myocardial infarction)    Diabetic neuropathy  Hands and Feet    Anxiety and depression    Other postprocedural status  Fixation hardware in foot LEFT    Stented coronary artery  10/18 heart attack  INFERIOR WALL MI    S/P CABG x 1  2018    S/P TKR (total knee replacement), right  with infection Mrsa   per pt he was cleared from MRSA infection    Surgery, elective  right knee wound debridement    History of open reduction and internal fixation (ORIF) procedure  right hip    H/O shoulder surgery  right    AICD (automatic cardioverter/defibrillator) present  [facility]    Cholecystostomy care  drain inserted 2020 & removed 4 months ago    History of tonsillectomy    History of hip replacement, total, right    Elective surgery  plastic surgery Left shin      ALLERGIES  ACE inhibitors (Rash)  Entresto (Swelling)  Atorvastatin Calcium (Unknown)  Bactrim (Unknown)  Plavix (Unknown)    MEDICATIONS  STANDING MEDICATIONS  aspirin enteric coated 81 milliGRAM(s) Oral daily  bacitracin   Ointment 1 Application(s) Topical every 12 hours  chlorhexidine 2% Cloths 1 Application(s) Topical daily  dextrose 5%. 1000 milliLiter(s) IV Continuous <Continuous>  dextrose 5%. 1000 milliLiter(s) IV Continuous <Continuous>  dextrose 50% Injectable 25 Gram(s) IV Push once  dextrose 50% Injectable 12.5 Gram(s) IV Push once  dextrose 50% Injectable 25 Gram(s) IV Push once  DULoxetine 30 milliGRAM(s) Oral <User Schedule>  folic acid 1 milliGRAM(s) Oral <User Schedule>  gabapentin 100 milliGRAM(s) Oral every 12 hours  glucagon  Injectable 1 milliGRAM(s) IntraMuscular once  heparin   Injectable 5000 Unit(s) SubCutaneous every 12 hours  insulin glargine Injectable (LANTUS) 8 Unit(s) SubCutaneous every morning  insulin lispro (ADMELOG) corrective regimen sliding scale   SubCutaneous three times a day before meals  iron sucrose IVPB 200 milliGRAM(s) IV Intermittent every 24 hours  levothyroxine 25 MICROGram(s) Oral <User Schedule>  levothyroxine 50 MICROGram(s) Oral <User Schedule>  multivitamin/minerals 1 Tablet(s) Oral daily  pantoprazole    Tablet 40 milliGRAM(s) Oral before breakfast  prasugrel 10 milliGRAM(s) Oral daily  spironolactone 12.5 milliGRAM(s) Oral daily  tamsulosin 0.4 milliGRAM(s) Oral at bedtime  torsemide 20 milliGRAM(s) Oral every 12 hours    PRN MEDICATIONS  acetaminophen     Tablet .. 325 milliGRAM(s) Oral every 4 hours PRN  dextrose Oral Gel 15 Gram(s) Oral once PRN    VITALS:  T(F): 96.8  HR: 93  BP: 92/51  RR: 17  SpO2: --    PHYSICAL EXAM  GEN: no distress, comfortable  PULM: diminished BS posteriorly, No wheezing  CVS: S1S2 present, no rubs or gallops  ABD: Soft, non-distended, no guarding; non-tender  EXT: right AKA  NEURO: A&Ox3, moving all extremities    LABS                        RADIOLOGY

## 2023-06-18 NOTE — PROGRESS NOTE ADULT - ASSESSMENT
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 He states he was lying in bed at home when he fell asleep and fell off the bed. Found to have moises and mild elevation of trop   patient hospitalization was complicated with cardiogenic and septic shock requiring treatment in CCU. - intubated for airway protection 6/5; extub 6/8; was in Acute HFrEF, cardiogenic and septic shocks 2/2 ESBL UTI, was on pressors and ionotropes  Patient downgraded to floor once stable    A&P    # Acute on chronic HFrEF 20%;   # Cardiogenic shock - s/p swan catheter and ionotrops- now resolved  - cardio and HF following  - (pt allergic to ACEI and Entresto)  - daily wts, Is and Os  - HF following- treated with IV diuretics; now on Torsemide 20mg BID for 5 days, then 20mg qd,   - take an extra tablet if a weight gain of 2 lbs or more in one day upon discharge.   - started on Spironolactone 12.5mg qd  - IV venofer x 5days  - OP f/u with HF in 1-2 wks after dc  - hold losartan for hypotension  continue torsemide, aldactone    #Acute Urinary retention s/p fields placement on 6/13  - Urology evaluated- recommendation reviewed- pt. follows with Dr. connelly OP,  recommended CIC  - TOV today; patient's post void bladder scan X2- 60 and 70 ml  continue monitoring    #  hypotension - related to cardiomyopathy- asymptomatic  monitor   hold losartan  adjust meds as tolerated  patient A&OX3; no peripheral cyanosis; monitor    # CAD; MI; s/p CABG; s/p stent  #  PAD s/p stent and rt AKA  - c/w DAPT: asa + prasugrel (pt allergic to plavix)    # mod MR; mod-sev TR    # diarrhea-improved  laxatives held  no episodes today    # Severe malnutrition  # dysphagia   speech following;  advance diet per recommendation; monitor for signs of aspiration    #Septic shock 2/2 UTI 2/2 mult strains ESBL Kleb- resolved  - ID eval appreciated- s/p abx: laquita 1gm iv q12 till 6/14   - BCx neg    # Skin excoriations; periph neuropathy 2/2 DM and vasc  - bacitracin topical  - MRSA nares +: s/p bactroban oint to nares top q12 for 5 days (6/7-6/12)  - sponge bath  - c/w neurontin 100mg po q12  - c/w Duloxetine 30mg 3x/day  - c/w wound care per wound care team    # MOISES on CKD3- resolved    # Anemia of chr dz plus iron def anemia  hg stable  - iron sat 3%; ferr 56 s/p venofer x5days  -  oral iron as outpt  - will need outpatient GI eval once stable as outpt for awhile    # DM T2 w/ neuropathy  - c/w diabetic DASH diet  - FS qac/hs - goal 100-180  - decrease lantus from 12 to 8 units   - SSI     #Hypothyroid; anti TPO Ab+ >>c/w synthroid 25 mcg q24 and 50 on Sunday  - TSH 5.6 in 1/2023- outpt f/u    # DLD  - on crestor at home and pt allergic to lipitor- statins on hold  - Lipid panel wnl  - LFTs were prob abnl from hepatic congestion - LFTs improving    # Rheum Arth- c/w MTX 10mg po q wed + folate 1mg po q24; Holding Pred 5mg po q24  # TIAGO- CPAP not in use 2/2 claustrophobia  # BPH- restarted Flomax 04 on 6/13 on BID at home may increase to BID if BP allows  # Anxiety- on valium 1-2mg at home prn - hold off for now    # Activity: PT to follow    # DVT ppx: Hep sc q12  # GI ppx: change PPI to 40mg po q24   # Code Status: Full    Pending: prepare for discharge, TOV; monitor bladder scan  discharge plan to rehab.     plan of care discussed with patient and also with wife.  Discussed with nursing and case management.

## 2023-06-19 NOTE — PROGRESS NOTE ADULT - SUBJECTIVE AND OBJECTIVE BOX
FLOWER MARISCAL 67y Male  MRN#: [id number]   CODE STATUS:________full    Hospital Day: 32d    Pt is currently admitted with the primary diagnosis of fall    SUBJECTIVE    No adverse overnight events     Subjective complaints     Patient was examined and seen by the bedside. No complaints. Denies fever, chills, SOB, nausea, vomiting.                                           OBJECTIVE  PAST MEDICAL & SURGICAL HISTORY  Status post percutaneous transluminal coronary angioplasty  2 stents    Atherosclerosis of coronary artery  CAD (coronary artery disease)    Osteoarthritis    HLD (hyperlipidemia)    Diabetes  on insulin pump    CHF (congestive heart failure)  EF ~ 25%    History of celiac disease    Diverticulitis    STEMI (ST elevation myocardial infarction)    Diabetic neuropathy  Hands and Feet    Anxiety and depression    Other postprocedural status  Fixation hardware in foot LEFT    Stented coronary artery  10/18 heart attack  INFERIOR WALL MI    S/P CABG x 1  2018    S/P TKR (total knee replacement), right  with infection Mrsa   per pt he was cleared from MRSA infection    Surgery, elective  right knee wound debridement    History of open reduction and internal fixation (ORIF) procedure  right hip    H/O shoulder surgery  right    AICD (automatic cardioverter/defibrillator) present  [facility]    Cholecystostomy care  drain inserted 2020 & removed 4 months ago    History of tonsillectomy    History of hip replacement, total, right    Elective surgery  plastic surgery Left shin                                                ALLERGIES:  ACE inhibitors (Rash)  Entresto (Swelling)  Atorvastatin Calcium (Unknown)  Bactrim (Unknown)  Plavix (Unknown)                           HOME MEDICATIONS  Home Medications:  aspirin 81 mg oral delayed release tablet: 1 tab(s) orally once a day (23 Feb 2023 02:35)  diazePAM 2 mg oral tablet: 0.5 tab(s) orally once a day (at bedtime) (23 Feb 2023 02:35)  DULoxetine 30 mg oral delayed release capsule: 1 cap(s) orally 3 times a day (23 Feb 2023 02:35)  insulin glargine 100 units/mL subcutaneous solution: 25 unit(s) subcutaneous once a day (at bedtime) (23 Feb 2023 02:35)  insulin glargine 100 units/mL subcutaneous solution: 40 microcurie subcutaneous once a day (23 Feb 2023 02:35)  insulin lispro 100 units/mL injectable solution: if your finger stick is 150-199 please inject 2 units  if your finger stick is 200-250 please inject 4 units  if your finger stick is 251-300 please inject 6 units  if your finger stick is 301-350 please inject 8 units  if your finger stick is more than 350 please call your physician    (23 Feb 2023 02:35)  levothyroxine 25 mcg (0.025 mg) oral tablet: 1 tab(s) orally 6 times a week (23 Feb 2023 02:35)  levothyroxine 50 mcg (0.05 mg) oral tablet: 1 tab(s) orally once a week (23 Feb 2023 02:35)  metoprolol succinate 25 mg oral tablet, extended release: 1 tab(s) orally once a day (23 Feb 2023 02:35)  midodrine 5 mg oral tablet: 1 tab(s) orally 2 times a day (01 Jun 2023 14:59)  Multiple Vitamins oral tablet: 1 tab(s) orally once a day (23 Feb 2023 02:35)  pantoprazole 40 mg oral delayed release tablet: 1 tab(s) orally once a day (before a meal) (23 Feb 2023 02:35)  prasugrel 10 mg oral tablet: 1 tab(s) orally once a day (23 Feb 2023 02:35)  QUEtiapine 25 mg oral tablet: 1 tab(s) orally once a day (01 Jun 2023 14:57)  rosuvastatin 40 mg oral tablet: 1 tab(s) orally once a day (23 Feb 2023 02:35)  spironolactone 25 mg oral tablet: 1 tab(s) orally once a day (23 Feb 2023 02:35)                           MEDICATIONS:  STANDING MEDICATIONS  aspirin enteric coated 81 milliGRAM(s) Oral daily  bacitracin   Ointment 1 Application(s) Topical every 12 hours  chlorhexidine 2% Cloths 1 Application(s) Topical daily  dextrose 5%. 1000 milliLiter(s) IV Continuous <Continuous>  dextrose 5%. 1000 milliLiter(s) IV Continuous <Continuous>  dextrose 50% Injectable 25 Gram(s) IV Push once  dextrose 50% Injectable 12.5 Gram(s) IV Push once  dextrose 50% Injectable 25 Gram(s) IV Push once  DULoxetine 30 milliGRAM(s) Oral <User Schedule>  folic acid 1 milliGRAM(s) Oral <User Schedule>  gabapentin 100 milliGRAM(s) Oral every 12 hours  glucagon  Injectable 1 milliGRAM(s) IntraMuscular once  heparin   Injectable 5000 Unit(s) SubCutaneous every 12 hours  insulin glargine Injectable (LANTUS) 8 Unit(s) SubCutaneous every morning  insulin lispro (ADMELOG) corrective regimen sliding scale   SubCutaneous three times a day before meals  iron sucrose IVPB 200 milliGRAM(s) IV Intermittent every 24 hours  levothyroxine 25 MICROGram(s) Oral <User Schedule>  levothyroxine 50 MICROGram(s) Oral <User Schedule>  multivitamin/minerals 1 Tablet(s) Oral daily  pantoprazole    Tablet 40 milliGRAM(s) Oral before breakfast  prasugrel 10 milliGRAM(s) Oral daily  spironolactone 12.5 milliGRAM(s) Oral daily  tamsulosin 0.4 milliGRAM(s) Oral at bedtime  torsemide 10 milliGRAM(s) Oral daily    PRN MEDICATIONS  acetaminophen     Tablet .. 325 milliGRAM(s) Oral every 4 hours PRN  dextrose Oral Gel 15 Gram(s) Oral once PRN                                            ------------------------------------------------------------  VITAL SIGNS: Last 24 Hours  T(C): 36.3 (18 Jun 2023 21:20), Max: 36.3 (18 Jun 2023 21:20)  T(F): 97.3 (18 Jun 2023 21:20), Max: 97.3 (18 Jun 2023 21:20)  HR: 96 (19 Jun 2023 05:00) (93 - 96)  BP: 83/55 (19 Jun 2023 05:00) (83/55 - 105/67)  BP(mean): --  RR: 18 (19 Jun 2023 05:00) (17 - 18)  SpO2: 96% (18 Jun 2023 21:20) (96% - 97%)      06-18-23 @ 07:01  -  06-19-23 @ 07:00  --------------------------------------------------------  IN: 0 mL / OUT: 1200 mL / NET: -1200 mL                                               LABS:                                                                    RADIOLOGY:        PHYSICAL EXAM:  GENERAL: NAD, lying in bed comfortably  HEAD:  Atraumatic, Normocephalic  EYES: conjunctiva and sclera clear  ENT: Moist mucous membranes  NECK: Supple   CHEST/LUNG: Clear to auscultation bilaterally;Unlabored respirations  HEART: Regular rate and rhythm;   ABDOMEN: Soft, nontender, nondistended  EXTREMITIES:  No clubbing, cyanosis, or edema  NERVOUS SYSTEM:  A&Ox1  SKIN: No rashes                                          ASSESSMENT & PLAN    67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 He states he was lying in bed at home when he fell asleep and fell off the bed. Found to have moises and mild elevation of trop     # Downgrade from CCU; Confused- intub for airway protection 6/5; extub 6/8; was in Acute HFrEF, cardiogenic and septic shocks 2/2 ESBL UTI, was on pressors and ionotropes-resolved now    # Baseline fxn per family: pt has severe lt leg flexion contracture, which prohibits standing and ability to get prosthetic for rt leg; pt needs asst, but can help w/ transfers to WC or car; pt has not been in shower in years, just sponge baths; MS is usually "normal" (perhaps rare, slight forgetfulness); can feed self; needs asst w/ dressing; cannot do chores; family cares for him; pt, with asst, can get out of house - has ramps, uses slide board to get in and out of car, uses WC to get around      # Acute on chronic HFrEF 20%; biventricular dysfxn; dilated CM; mod MR; mod-sev TR; HTN; CAD; MI; s/p CABG; s/p stent; PAD s/p stent and rt AKA  #Cardiogenic shock s/p swan catheter and ionotrops- now resolved  - cardio and HF following  - no GDMT since BP low (pt allergic to ACEI and Entresto)  - HOLD toprol xl 25mg q24  - c/w DAPT: asa + prasugrel (pt allergic to plavix)  - daily wts, Is and Os  - Currently On RA satting well  - CXR slightly better, IVC 2cm, <50% collapsible, lactate normal  - HF following- s/p IV Lasix 40mg BID on 6/13>> switched to Torsemide 20mg qd today  - started on Spironolactone 12.5mg qd  - HF recs appreciated- Torsemide 20mg BID for 5 days, then 20mg qd, take an extra tablet if a weight gain of 2 lbs or more in one day upon discharge. switch Hydralazine to Losartan 25mg qd  - IV venofer x 5days  - OP f/u with HF in 1-2 wks after dc    # Hospital delirum; poss underlying mild vasc-induced cog impairment  - Pt. intub for airway protection 6/5; extub 6/8;  - CTH noted: chr microvasc changes  - EEG -ve  - can use melatonin for sleep  - not req anti-psych    # Severe malnutrition; hypernatremia; hx of DISH causing dysphagia   -  puree + mild thick liquids, c/w supplements and MVs  - s/p Mod. barium swallow today- c/w current diet, pt. at high risk of aspiration, needs multiple swallows, monitor for weight loss and malnutrition, aspiration precautions  - Encourage oral hydration up to 1.5 L /day    #Acute Urinary retention s/p fields placement on 6/13  #Septic shock 2/2 UTI 2/2 mult strains ESBL Kleb- resolved  - ID eval appreciated- s/p abx: laquita 1gm iv q12 till 6/14   - BCx neg  - Urology eval appreciated- pt. following Dr. connelly OP, pending UDS, recommended CIC, but pt. non-compliant to that, wife agreeable to help with CIC,   - TOV 6/18 patient urinates with 400cc residual    # Skin excoriations; periph neuropathy 2/2 DM and vasc  - can use bacitracin on skin  - MRSA nares +: s/p bactroban oint to nares top q12 for 5 days (6/7-6/12)  - sponge bath  - c/w neurontin 100mg po q12  - c/w Duloxetine 30mg 3x/day  - can tx pain as needed  - c/w wound care per wound care team    # MOISES on CKD3- resolved  # Anemia of chr dz plus iron def anemia  - base Cr low 1s  - peak CR 3.6, now back to baseline  - iron sat 3%; ferr 56 s/p venofer x5days  - will use oral iron as outpt  - will need OUTPT GI eval once stable as outpt for awhile    # DM T2 w/ neuropathy  - c/w diabetic DASH diet  - FS qac/hs - goal 100-180  - c/w lantu2 12 HS (on up to 44 units at home)  - SSI - might need lisp w/ meals    #Hypothyroid; anti TPO Ab+ >>c/w synthroid 25 mcg q24 and 50 on Sunday  - TSH 5.6 in 1/2023- outpt f/u    # DLD  - on crestor at home and pt allergic to lipitor- statins on hold  - Lipid panel wnl  - LFTs were prob abnl from passive congestion - LFTs improving    # Rheum Arth- c/w MTX 10mg po q wed + folate 1mg po q24; Holding Pred 5mg po q24  # bowel reg cont- Held PEG q12 + senna 2 HS, for diarrhoea  # ITAGO- CPAP not in use 2/2 claustrophobia  # BPH- restarted Flomax 04 on 6/13 on BID at home may increase to BID if BP allows  # Anxiety- on valium 1-2mg at home prn - hold off for now 2/2 delirium    # Activity: family brought in anti-flex contracture device; PT eval with prosthesis; OOBTC as tolerated  # DVT ppx: Hep sc q12  # GI ppx: change PPI to 40mg po q24 (not IV)  # Code Status: Full     Dispo: pt will go to SNF, pending SNF. Patient has no behavioral issues and has been out of a [facility] for 48 hours.                                   done

## 2023-06-19 NOTE — CHART NOTE - NSCHARTNOTEFT_GEN_A_CORE
CCU Transfer Note    Transfer from: CCU    Transfer to: (X) Medicine    (  ) Telemetry    (  ) RCU                               (  ) Palliative    (  ) Stroke Unit    (  ) MICU    (  ) __________________    HPI / CCU COURSE:  67y M pmh HFrEF 15-20% s/p ICD, decreased RV function, mild MR/TR, DM type I w/ neuropathy, hx hypoglycemia , DLD, HTN, CAD, hx MI, s/p CABG, s/p stent (2018), hx in-stent thrombosis while on Plavix, PAD s/p 3 LLE stents, TIAGO (needs CPAP auto-regulating pressure 10-16, patient doesn't use it due to claustrophobia), Psoriasis, R-AKA presenting to ED s/p fall.    He states he was lying in bed at home when he fell asleep and fell off the bed, and complains of generalized headache neck pain, non-radiating, no alleviating or aggravating factors, associated with right anterior chest wall pain. Denies LOC,  fevers, chills, nausea, vomiting, dizziness, abdominal pain, shortness of breath. States he had TDAP recently. Pt was not down for long as wife was upstairs when he fell off the bed and called EMS right away.     ED  Vital Signs  T(F):Max: 97.9   HR: 76 - 110  BP: 100/61 - 102/62  RR: 18  SpO2: 94% - 96% on room air     Labs significant for cr 1.8 (baseline ~ 1.2)  trop 0.10    - Patient was admitted for workup of mechanical fall.   - CT scan -ve for acute traumatic injury to chest/abdomen/pelvis.   - Patient had moderate to large b/l pleural effusion and moderate abdominopelvic ascites, managed for acute on chronic HF exacerbation   - BNP 9k, TTE 5/24: LVEF 20%, mod MR, modsev TR  - HF team following currently on torsemide 20mg BID x5d > 20mg qD  - 6/4 patient found minimally responsive, BS 90, lactate 6.1, pO2 44 on abg, repeat CXR consistent with volume overload  - infectious workup sent, repeat BNP increase to 30k, Cr 2.5>3.2, ekg unchanged paced tachycardia   - s/p 250cc IVF, hemodynamically stable on NRB  - upgraded to CEU for management of lactic acidosis 2/2 septic vs cardiogenic shock     CEU course:   - 6/5/23 patient was found to have altered mental status and could not protect his airway, he was then intubated  - Evaluated by heart failure who is starting him on milrinone and he is being upgrade to CCU for further monitoring and treatment.    CCU Course:  - Patient monitored in CCU for suspected mixed cardiogenic/septic shock   - BCX -ve, UCx +ve ESBL Klebsiella - switched from Cefepime to Meropenem   - Patient weaned off of inotrope and pressor support, received adequate diuresis now euvolemic   - Successfully extubated, on RA, hypernatremia corrected with resolution of mental status back to baseline   - Failed SLP eval, NG tube placed pending Barium swallow   - Medically stable for DGTF   - Plan follows below           Vital Signs Last 24 Hrs  T(C): 36.1 (09 Jun 2023 12:00), Max: 36.8 (09 Jun 2023 00:00)  T(F): 97 (09 Jun 2023 12:00), Max: 98.2 (09 Jun 2023 00:00)  HR: 104 (09 Jun 2023 12:00) (104 - 114)  BP: 89/55 (09 Jun 2023 12:00) (83/44 - 106/62)  BP(mean): 68 (09 Jun 2023 12:00) (57 - 78)  RR: 30 (09 Jun 2023 12:00) (17 - 36)  SpO2: 94% (09 Jun 2023 12:00) (92% - 100%)    Parameters below as of 09 Jun 2023 12:00  Patient On (Oxygen Delivery Method): room air        I&O's Summary    08 Jun 2023 07:01  -  09 Jun 2023 07:00  --------------------------------------------------------  IN: 1050 mL / OUT: 1265 mL / NET: -215 mL        Physical Exam:   GEN: NAD, Resting comfortably in bed, cooperative  PULM: Clear to auscultation bilaterally, No wheezing, rales, rhonchi  CVS: Regular rate and rhythm, S1-S2, no murmurs, heaves, thrills  ABD: Soft, non-tender, non-distended, no guarding, BS +  EXT: No edema/cyanosis, extremities warm/dry, peripheral pulses palpable   NEURO: A&Ox3, no focal deficits    LABS:                               9.7    6.43  )-----------( 101      ( 09 Jun 2023 08:06 )             33.7       06-09    150<H>  |  108  |  26<H>  ----------------------------<  208<H>  3.9   |  33<H>  |  1.1    Ca    8.4      09 Jun 2023 08:06  Mg     2.0     06-09    TPro  5.5<L>  /  Alb  2.9<L>  /  TBili  2.4<H>  /  DBili  x   /  AST  23  /  ALT  34  /  AlkPhos  93  06-09          ABG - ( 08 Jun 2023 10:46 )  pH, Arterial: 7.48  pH, Blood: x     /  pCO2: 48    /  pO2: 168   / HCO3: 36    / Base Excess: 10.9  /  SaO2: 99.2                  ECG: Paced Rhythm     Telemetry: Occasional PVCs    Imaging:  CTH 6/6  1.  No CT evidence for acute intracranial pathology.  2.  Mild to moderate chronic microvascular ischemic changes.    KBUS 6/6  No hydronephrosis.    LLE arterial and venous duplex 6/6 - prelim read   Normal arterial flow in the left lower extremity.  No evidence of deep venous thrombosis or superficial thrombophlebitis in   the left lower extremity.      CT C/A/P 5/18  No CT evidence for acute traumatic injury within the chest, abdomen or   pelvis. Moderate to large bilateral pleural effusions, right greater than left.   Small to moderate volume abdominopelvic ascites. Cardiomegaly with focal discontinuity anterior left atrial wall, unchanged. Unchanged left renal pelvic dilatation with appearance of left UPJ obstruction.    TTE 5/24:   1. Severely decreased global left ventricular systolic function.   2. Multiple left ventricular regional wall motion abnormalities exist.   See wall motion findings.   3. LV Ejection Fraction by Wilkerson's Method with a biplane EF of 20 %.   4. Global cardiomyopathy.   5. Normal left ventricular internal cavity size.   6. Mildly enlarged right ventricle.   7. Moderately reduced RV systolic function.   8. Mildly enlarged right atrium.   9. Moderately enlarged left atrium.  10. There is no evidence of pericardial effusion.  11. Mild thickening and calcification of the anterior and posterior   mitral valve leaflets.  12. Moderate mitral annular calcification.  13. Moderate mitral valve regurgitation.  14. Moderate-severe tricuspid regurgitation.          ASSESSMENT & PLAN:   67y M pmh HFrEF 15-20% s/p ICD, decreased RV function, mild MR/TR, DM type I w/ neuropathy, hx hypoglycemia , DLD, HTN, CAD, hx MI, s/p CABG, s/p stent (2018), hx in-stent thrombosis while on Plavix, PAD s/p 3 LLE stents, TIAGO (needs CPAP auto-regulating pressure 10-16, patient doesn't use it due to claustrophobia), Psoriasis, R-AKA presenting to ED s/p fall admitted for trauma w/u c/b acute hypoxic respiratory failure 2/2 septic shock.       Acute hypoxemic/hypercapnia respiratory failure   Septic/Cardiogenic Shock 2/2 ESBL Klebsiella UTI  HFrEF, ICM s/p AICD   CAD s/p CABG s/p PCI  MOISES on CKD improving  Thrombocytopenia  Transaminitis  Iron deficiency anemia   Complicated DMI w/ neuropathy  Psoriasis  PVD s/p R AKA, LLE stenting x3  s/p mechanical fall  Hx LLE in-stent thrombosis while on Plavix  TIAGO      PLAN:    CNS:   - off sedation  - CTH, EEG -ve  - monitor for change in mental status    HEENT:  Oral care    PULMONARY  - s/p extubation 6/8 AM - post ABG on BiPAP 7.48/48/168/36  - HOB @ 45 degrees, keep Sao2 92 to 96%  - Aspiration precautions   - wean BiPAP to NC as tolerated     CARDIOVASCULAR:   - TTE 5/24: EF 20%, global cardiomyopathy, mod MR, mod-sev TR  - trop 0.23>0.16  - off inotropes/pressure support   - L IJ Cordis out   - holding diuretics, currently euvolemic  - strict I/Os   - A-line removed 6/8  - Mathews removed 6/8x    GI  - Failed SLP eval - NG tube placed, pending CXR  - LFTs improving, ALP 99, AST/ALT 28/45  - trend LFTS  - c/w senna 2 tab qhs, miralax bid  - c/w GI ppx 40mg qD    RENAL  - Na 144>150, on D5W, will d/c and start NG feeds w/ free water flushes  - Cr 1.1>3.5>2.5>1.4>1.1  reported bsl CKD III  - UA +ve protein, nitrite, LE, blood, wbc, bacteria  - UCX +ve ESBL klebsiella sensisitve to meropenem  - KBUS -ve hydronephrosis   - Net I/O -165  - trend BMP qD, correct electrolytes as needed  - strict I/Os c/w fields     INFECTIOUS DISEASE  - off vancomymcin  - MRSA nares +ve - mupirocin BID x5d 6/7-6/11  - UA positive, Bcx -ve 6/6, UCx +ve ESBL Klebsiella 6/5  - d/c cefepime, c/w meropenem 2g IV q12hr until 6/14  - ID consult placed     HEMATOLOGICAL  - c/w heparin 5k BID  - PAD LLE - arterial duplex adequate flow,-ve DVT  - c/w prasugrel 10mg po qD, ASA 81mg qD  - hgb stable ~10, mcv 75, serum iron low, %sat low, ferritin wnl s/p venofer x5d  - >120>105>101 continue to monitor  - c/w folate 1mg S/M/T/Th/F/Sa    ENDOCRINE:   - FS goal 140-180, c/w ISS, started lantus 12 qAM, will need mealtime insulin with feeds  - c/w levothyroxine 25mcg M/T/W/Th/F/Sa, 50mcg Dueñas  - c/w MTX 10mg qW    # To follow up  - 4PM BMP, lactate  - trend LFTs/PLT, Cr, monitor UOP   - SLP for dysphagia screen    # Misc  - DVT Prophylaxis: heparin   Injectable  - GI Prophylaxis: pantoprazole    Tablet 40 milliGRAM(s) Oral before breakfast  - Diet: Diet, NPO  - Activity: Activity - IAT  - Code Status: Full
CEU Transfer Note    Transfer from: 4B    Transfer to: (  ) Medicine    (  ) Telemetry    (  ) RCU                               (  ) Palliative    (  ) Stroke Unit    (  ) MICU    (X) Stepdown     Accepting Physician: Christiana     Signout given to: Cheryle    HPI / Hospital COURSE:    67y M pmh HFrEF 15-20% s/p ICD, decreased RV function, mild MR/TR, DM type I w/ neuropathy, hx hypoglycemia , DLD, HTN, CAD, hx MI, s/p CABG, s/p stent (2018), hx in-stent thrombosis while on Plavix, PAD s/p 3 LLE stents, TIAGO (needs CPAP auto-regulating pressure 10-16, patient doesn't use it due to claustrophobia), Psoriasis, R-AKA presenting to ED s/p fall.    He states he was lying in bed at home when he fell asleep and fell off the bed, and complains of generalized headache neck pain, non-radiating, no alleviating or aggravating factors, associated with right anterior chest wall pain. Denies LOC,  fevers, chills, nausea, vomiting, dizziness, abdominal pain, shortness of breath. States he had TDAP recently. Pt was not down for long as wife was upstairs when he fell off the bed and called EMS right away.     ED  Vital Signs  T(F):Max: 97.9   HR: 76 - 110  BP: 100/61 - 102/62  RR: 18  SpO2: 94% - 96% on room air     Labs significant for cr 1.8 (baseline ~ 1.2)  trop 0.10    - Patient was admitted for workup of mechanical fall.   - CT scan -ve for acute traumatic injury to chest/abdomen/pelvis.   - Patient had moderate to large b/l pleural effusion and moderate abdominopelvic ascites, managed for acute on chronic HF exacerbation   - BNP 9k, TTE 5/24: LVEF 20%, mod MR, modsev TR  - HF team following currently on torsemide 20mg BID x5d > 20mg qD  - 6/4 patient found minimally responsive, BS 90, lactate 6.1, pO2 44 on abg, repeat CXR consistent with volume overload  - infectious workup sent, repeat BNP increase to 30k, Cr 2.5>3.2, ekg unchanged paced tachycardia   - s/p 250cc IVF, hemodynamically stable on NRB  - upgraded to CEU for management of lactic acidosis 2/2 septic vs cardiogenic shock       Vital Signs Last 24 Hrs  T(C): 36.6 (04 Jun 2023 20:53), Max: 36.6 (04 Jun 2023 14:36)  T(F): 97.9 (04 Jun 2023 20:53), Max: 97.9 (04 Jun 2023 14:36)  HR: 88 (04 Jun 2023 20:53) (88 - 114)  BP: 111/81 (04 Jun 2023 20:53) (100/54 - 121/58)  BP(mean): --  RR: 18 (04 Jun 2023 20:53) (17 - 18)  SpO2: 100% (04 Jun 2023 20:53) (91% - 100%)    Parameters below as of 04 Jun 2023 08:43  Patient On (Oxygen Delivery Method): room air        I&O's Summary    03 Jun 2023 07:01  -  04 Jun 2023 07:00  --------------------------------------------------------  IN: 0 mL / OUT: 500 mL / NET: -500 mL        Physical Exam:   General: NAD, Alert at times oriented x 0, diaphoretic, agitated  ENT: MMM  Neck: Supple, No JVD  Lungs: diminished bilaterally, basal crackle, NRB  Cardio: IRR, S1/S2, 2/6 systolic murmur   Abdomen: Soft, Nontender, Nondistended; Bowel sounds present  Extremities: No cyanosis, right aka, b/l wrist restraints     LABS:                               11.0   11.35 )-----------( 235      ( 04 Jun 2023 08:38 )             37.5       06-04    145  |  104  |  48<H>  ----------------------------<  58<L>  x    |  21  |  3.2<H>    Ca    9.2      04 Jun 2023 20:00  Mg     2.1     06-04    TPro  6.2  /  Alb  3.1<L>  /  TBili  2.8<H>  /  DBili  x   /  AST  122<H>  /  ALT  46<H>  /  AlkPhos  110  06-04          ABG - ( 04 Jun 2023 14:34 )  pH, Arterial: 7.45  pH, Blood: x     /  pCO2: 40    /  pO2: 63    / HCO3: 28    / Base Excess: 3.5   /  SaO2: 89.5                  ECG: atrial sensed, ventricular paced rhythm       ASSESSMENT & PLAN:   68 yo M PMHx chronic HFrEF 15-20% s/p ICD, decreased RV function, mild MR/TR, DM type I w/ neuropathy and hx hypoglycemia, DLD, HTN, CAD, hx MI, s/p CABG, s/p stent (2018), hx in-stent thrombosis while on Plavix, PAD s/p 3 LLE stents, TIAGO (needs CPAP auto-regulating pressure 10-16, patient doesn't use it due to claustrophobia), Psoriasis, R-AKA presenting to ED s/p fall. He states he was lying in bed at home when he fell asleep and fell off the bed. Found to have moises and mild elevation of trop     #Change in mental status  #Hypoxemia   #Lactic acidosis - septic shock vs. Cardiogenic shock   -6/4 pt found minimally responsive  -BS -90, ABG - lactate 6.1, pO2-44  -Chest xray - fluid overloaded but no significantly different  -urine, blood cultures are sent, procal sent, BNP-[id number] up from 9000  - Cefepime and vanco started   - Noted worsening LFTs and creatinine - consistent with shock   - 1xNS bolus 250cc given  -critical care was consulted - pt accepted to Step down ICU, Cards consulted for possible CCU admission - pending decision  - EKG down - paced tachycardia, no ST changes   - Plan to repeat abg and consider intubation if remains hypoxemia     #Hyperkalemia  -6/4 pt given insulin and glucose to treat hyperkalemia  -monitor level     #Acute on Chronic HFrEF:   Patient with SOB, Pro-BNP 9100--->07790  CT chest and abdomen showed moderate tp large bilateral pleural effusions, right greater than left. Small to moderate volume abdominopelvic ascites.  CXR 5/29 is still with bilateral pleural effusion and interstitial edema.   Echo 5/24/23 showed LVEF <20%, mod MR, mod to severe TR.   s/p Bumex 2mg IV BID and Hypertonic saline 3% BID. Started on Torsemide 20mg bid  6/2 per heart failure; 6/4 pending cards reassessment may need further diuresis   Continue Metoprolol.   History of anaphylactic reaction" to ACE-I (presumably angioedema?) and has allergy to Entresto  Low sodium diet, fluid restriction 1.2L/day,   6/1: Repeat CXR better but still congested. D-dimer 437. Patient still short winded when talking. Orthopnea+.   HF followed up on the patient: torsemide 20 BID for 5 days and then QD   Pt had been receiving midodrine increased to 10mg bid on 6/2      Delirium   Adjustment disorder  Seen by psych-ok with dc home  Pt continues to get support from staff and family  6/3 overnight very agitated and today increased confusion     CAD s/p PCI  Type 2 NSTEMI due to demand ischemia:   Troponin 0.09>0.10  EKG showed paced rhythm.   Continue Aspirin, Prasugrel, Metoprolol, add Lipitor 40mg po daily.    Mechanical Fall   No CT evidence for acute traumatic injury within the chest, abdomen or pelvis  Fall Precaution, PT follow up.     MOISES on CKD III  likely cardiorenal vs obstruction.   Cr increased to 2.6, now improving to 1.2, baseline 1.1  Kidney US:  showed Mild left hydronephrosis/dilated pelvis, unchanged. Left lower pole 0.5 cm nonobstructive calculus.  s/p Loja placement.     Iron deficiency anemia:   Iron studies reviewed Iron level 13, Iron sat 3%  Continue Venofer 200mg IV daily for 5 days.      Transaminitis  Mild elevation, AST/ALT< resolved, likely congestive hepatopathy.     DM type 1 complicated by hypoglycemia:  episodes of hypoglycemia at home  Continue Lantus and insulin sliding scale.     Hypothyroidism: continue Synthroid    Recent dx of RA   Cont Methotrexate weekly.     DVT prophylaxis: Heparin SC.   full code  GOC discuss with wife 6/3 - recommended hospice and dnr/dni - wife states she will think about things (over 20min spent)  6/4 Wife was informed and at bedside that patient has become critically ill - she continues to state that all measures should be done to preserve life - she has been told that her  may not survive this hospitalization    Pending: cardio evaluation for possible CCU admission, blood culture, urine culture, procal, ID consult, repeat ABG    Very poor prognosis, advanced heart failure, recurrent admission, palliative follow up.     Pending: improved mental state plan dc home with home services             FOR FOLLOW UP:  [ ]   [ ]   [ ]     Cydney Irizarry MD PGY1
ICU UPGRADE NOTE    Patient is a 67y old  Male who presents with a chief complaint of Mechanical fall (05 Jun 2023 10:05)    INTERVAL HPI/OVERNIGHT EVENTS:    67y M pmh HFrEF 15-20% s/p ICD, decreased RV function, mild MR/TR, DM type I w/ neuropathy, hx hypoglycemia , DLD, HTN, CAD, hx MI, s/p CABG, s/p stent (2018), hx in-stent thrombosis while on Plavix, PAD s/p 3 LLE stents, TIAGO (needs CPAP auto-regulating pressure 10-16, patient doesn't use it due to claustrophobia), Psoriasis, R-AKA presenting to ED s/p fall.    He states he was lying in bed at home when he fell asleep and fell off the bed, and complains of generalized headache neck pain, non-radiating, no alleviating or aggravating factors, associated with right anterior chest wall pain. Denies LOC,  fevers, chills, nausea, vomiting, dizziness, abdominal pain, shortness of breath. States he had TDAP recently. Pt was not down for long as wife was upstairs when he fell off the bed and called EMS right away.     ED  Vital Signs  T(F):Max: 97.9   HR: 76 - 110  BP: 100/61 - 102/62  RR: 18  SpO2: 94% - 96% on room air     Labs significant for cr 1.8 (baseline ~ 1.2)  trop 0.10    - Patient was admitted for workup of mechanical fall.   - CT scan -ve for acute traumatic injury to chest/abdomen/pelvis.   - Patient had moderate to large b/l pleural effusion and moderate abdominopelvic ascites, managed for acute on chronic HF exacerbation   - BNP 9k, TTE 5/24: LVEF 20%, mod MR, modsev TR  - HF team following currently on torsemide 20mg BID x5d > 20mg qD  - 6/4 patient found minimally responsive, BS 90, lactate 6.1, pO2 44 on abg, repeat CXR consistent with volume overload  - infectious workup sent, repeat BNP increase to 30k, Cr 2.5>3.2, ekg unchanged paced tachycardia   - s/p 250cc IVF, hemodynamically stable on NRB  - upgraded to CEU for management of lactic acidosis 2/2 septic vs cardiogenic shock     CEU course:   On the morning of 6/5/23 patient was found to have altered mental status and could not protect his airway, he was then intubated.  He was seen by heart failure who is starting him on milrinone and he is being upgrade to CCU for further monitoring and treatment.    68 yo M PMHx chronic HFrEF 15-20% s/p ICD, decreased RV function, mild MR/TR, DM type I w/ neuropathy and hx hypoglycemia, DLD, HTN, CAD, hx MI, s/p CABG, s/p stent (2018), hx in-stent thrombosis while on Plavix, PAD s/p 3 LLE stents, TIAGO (needs CPAP auto-regulating pressure 10-16, patient doesn't use it due to claustrophobia), Psoriasis, R-AKA presenting to ED s/p fall. He states he was lying in bed at home when he fell asleep and fell off the bed. Found to have moises and mild elevation of trop     #Change in mental status  #Hypoxemia   #Lactic acidosis - septic shock vs. Cardiogenic shock   -6/4 pt found minimally responsive  -BS -90, ABG - lactate 6.1, pO2-44  -Chest xray - fluid overloaded but no significantly different  -urine, blood cultures are sent, procal sent, BNP-[id number] up from 9000  - Cefepime and vanco started   - Noted worsening LFTs and creatinine - consistent with shock   - 1xNS bolus 250cc given  -critical care was consulted - pt accepted to Step down ICU, Cards consulted for possible CCU admission - pending decision  - EKG down - paced tachycardia, no ST changes   - 6/5 patient continues to have ams, cannot protect airway and was intubated    #Hyperkalemia  -6/4 pt given insulin and glucose to treat hyperkalemia  -monitor level   -6/5 hyperkalemia again, given insulin d50    #Acute on Chronic HFrEF:   Patient with SOB, Pro-BNP 9100--->04788  CT chest and abdomen showed moderate tp large bilateral pleural effusions, right greater than left. Small to moderate volume abdominopelvic ascites.  CXR 5/29 is still with bilateral pleural effusion and interstitial edema.   Echo 5/24/23 showed LVEF <20%, mod MR, mod to severe TR.   s/p Bumex 2mg IV BID and Hypertonic saline 3% BID. Started on Torsemide 20mg bid  6/2 per heart failure; 6/4 pending cards reassessment may need further diuresis   Continue Metoprolol.   History of anaphylactic reaction" to ACE-I (presumably angioedema?) and has allergy to Entresto  Low sodium diet, fluid restriction 1.2L/day,   6/1: Repeat CXR better but still congested. D-dimer 437. Patient still short winded when talking. Orthopnea+.   HF followed up on the patient: torsemide 20 BID for 5 days and then QD   Pt had been receiving midodrine increased to 10mg bid on 6/2  -intubated on 6/5/23    #CAD s/p PCI  Type 2 NSTEMI due to demand ischemia:   Troponin 0.09>0.10  EKG showed paced rhythm.   Continue Aspirin, Prasugrel, Metoprolol, add Lipitor 40mg po daily.    #Mechanical Fall   No CT evidence for acute traumatic injury within the chest, abdomen or pelvis  Fall Precaution, PT follow up.     #MOISES on CKD III  likely cardiorenal vs obstruction.   Cr increased to 2.6, now improving to 1.2, baseline 1.1  Kidney US:  showed Mild left hydronephrosis/dilated pelvis, unchanged. Left lower pole 0.5 cm nonobstructive calculus.  s/p Loja placement.     #Iron deficiency anemia:   Iron studies reviewed Iron level 13, Iron sat 3%  Continue Venofer 200mg IV daily for 5 days.      #Transaminitis  Mild elevation, AST/ALT< resolved, likely congestive hepatopathy.     #DM type 1 complicated by hypoglycemia:  episodes of hypoglycemia at home  Continue Lantus and insulin sliding scale.     #Hypothyroidism: continue Synthroid    #Recent dx of RA   Cont Methotrexate weekly.     DVT prophylaxis: Heparin SC.   full code  GOC discuss with wife 6/3 - recommended hospice and dnr/dni - wife states she will think about things (over 20min spent)  6/4 Wife was informed and at bedside that patient has become critically ill - she continues to state that all measures should be done to preserve life - she has been told that her  may not survive this hospitalization    Follow up: repeat BMP for K at 4PM, repeat lactate (slowly coming down), cultures    REVIEW OF SYSTEMS:  CONSTITUTIONAL: No fever, chills  HEENT:  No blurry vision No sinus or throat pain  NECK: No pain or stiffness  RESPIRATORY: No cough, wheezing, chills or hemoptysis; No shortness of breath  CARDIOVASCULAR: No chest pain, palpitations  GASTROINTESTINAL: No abdominal pain. No nausea, vomiting, or diarrhea  GENITOURINARY: No dysuria  NEUROLOGICAL: No HA, No focal weakness  SKIN: No itching, burning, rashes, or lesions   MUSCULOSKELETAL: No joint pain or swelling; No muscle, back, or extremity pain    MEDICATIONS:  acetaminophen     Tablet .. 325 milliGRAM(s) Oral every 4 hours PRN  aspirin enteric coated 81 milliGRAM(s) Oral daily  buMETAnide Injectable 2 milliGRAM(s) IV Push two times a day  cefepime   IVPB 2000 milliGRAM(s) IV Intermittent every 24 hours  chlorhexidine 2% Cloths 1 Application(s) Topical daily  dexMEDEtomidine Infusion 0.2 MICROgram(s)/kG/Hr IV Continuous <Continuous>  dextrose 5%. 1000 milliLiter(s) IV Continuous <Continuous>  dextrose 5%. 1000 milliLiter(s) IV Continuous <Continuous>  dextrose 50% Injectable 25 Gram(s) IV Push once  dextrose 50% Injectable 12.5 Gram(s) IV Push once  dextrose 50% Injectable 25 milliLiter(s) IV Push once  dextrose 50% Injectable 25 Gram(s) IV Push once  dextrose Oral Gel 15 Gram(s) Oral once PRN  DULoxetine 30 milliGRAM(s) Oral <User Schedule>  fentaNYL   Infusion. 0.5 MICROgram(s)/kG/Hr IV Continuous <Continuous>  folic acid 1 milliGRAM(s) Oral <User Schedule>  glucagon  Injectable 1 milliGRAM(s) IntraMuscular once  heparin   Injectable 5000 Unit(s) SubCutaneous every 12 hours  hydrocortisone 2.5% Cream 1 Application(s) Topical two times a day  insulin lispro (ADMELOG) corrective regimen sliding scale   SubCutaneous three times a day before meals  lactulose Syrup 10 Gram(s) Oral at bedtime  levothyroxine 25 MICROGram(s) Oral <User Schedule>  levothyroxine 50 MICROGram(s) Oral <User Schedule>  magnesium sulfate  IVPB 2 Gram(s) IV Intermittent once  magnesium sulfate  IVPB 2 Gram(s) IV Intermittent once  methotrexate (Non - oncologic) 10 milliGRAM(s) Oral every week  metoprolol succinate ER 25 milliGRAM(s) Oral daily  midodrine. 10 milliGRAM(s) Oral three times a day  milrinone Infusion 0.125 MICROgram(s)/kG/Min IV Continuous <Continuous>  norepinephrine Infusion 0.05 MICROgram(s)/kG/Min IV Continuous <Continuous>  oxyCODONE    IR 5 milliGRAM(s) Oral every 6 hours PRN  pantoprazole    Tablet 40 milliGRAM(s) Oral before breakfast  polyethylene glycol 3350 17 Gram(s) Oral two times a day  prasugrel 10 milliGRAM(s) Oral daily  senna 2 Tablet(s) Oral at bedtime  vancomycin  IVPB 500 milliGRAM(s) IV Intermittent every 8 hours      T(C): 36.7 (06-05-23 @ 04:30), Max: 36.7 (06-05-23 @ 04:30)  HR: 103 (06-05-23 @ 10:44) (88 - 113)  BP: 99/58 (06-05-23 @ 10:44) (99/58 - 111/81)  RR: 18 (06-05-23 @ 10:44) (18 - 20)  SpO2: 100% (06-05-23 @ 10:44) (100% - 100%)  Wt(kg): --Vital Signs Last 24 Hrs  T(C): 36.7 (05 Jun 2023 04:30), Max: 36.7 (05 Jun 2023 04:30)  T(F): 98 (05 Jun 2023 04:30), Max: 98 (05 Jun 2023 04:30)  HR: 103 (05 Jun 2023 10:44) (88 - 113)  BP: 99/58 (05 Jun 2023 10:44) (99/58 - 111/81)  BP(mean): 75 (05 Jun 2023 10:44) (75 - 75)  RR: 18 (05 Jun 2023 10:44) (18 - 20)  SpO2: 100% (05 Jun 2023 10:44) (100% - 100%)        PHYSICAL EXAM:  GENERAL: NAD, well-groomed, well-developed  HEAD:  Atraumatic, Normocephalic  EYES: EOMI, PERRLA, conjunctiva and sclera clear  ENMT:  Moist mucous membranes  NECK: Supple, No JVD,  CHEST/LUNG: Clear to auscultation bilaterally; No rales, rhonchi, wheezing, or rubs  HEART: Regular rate and rhythm; No murmurs, rubs, or gallops  ABDOMEN: Soft, Nontender, Nondistended; Bowel sounds present  NEURO: Alert & Oriented X3  EXTREMITIES: No LE edema, no calf tenderness  LYMPH: No lymphadenopathy noted  SKIN: No rashes or lesions    Consultant(s) Notes Reviewed:  [x ] YES  [ ] NO  Care Discussed with Consultants/Other Providers [ x] YES  [ ] NO    LABS:                        11.0   11.35 )-----------( 235      ( 04 Jun 2023 08:38 )             37.5     06-05    147<H>  |  100  |  54<H>  ----------------------------<  210<H>  5.0   |  29  |  3.6<H>    Ca    9.1      05 Jun 2023 09:40  Mg     2.1     06-04    TPro  6.2  /  Alb  3.3<L>  /  TBili  3.1<H>  /  DBili  x   /  AST  152<H>  /  ALT  78<H>  /  AlkPhos  122<H>  06-05    PT/INR - ( 05 Jun 2023 09:40 )   PT: 24.90 sec;   INR: 2.13 ratio         PTT - ( 05 Jun 2023 09:40 )  PTT:28.1 sec    CAPILLARY BLOOD GLUCOSE      POCT Blood Glucose.: 66 mg/dL (05 Jun 2023 11:03)  POCT Blood Glucose.: 66 mg/dL (05 Jun 2023 09:15)  POCT Blood Glucose.: 84 mg/dL (04 Jun 2023 21:09)  POCT Blood Glucose.: 77 mg/dL (04 Jun 2023 17:01)      ABG - ( 04 Jun 2023 14:34 )  pH, Arterial: 7.45  pH, Blood: x     /  pCO2: 40    /  pO2: 63    / HCO3: 28    / Base Excess: 3.5   /  SaO2: 89.5
LMSW met with Pt and wife at bedside. Pt noted he was in pain but noted it was tolerable. Pt notes pain decreases when he is given pain medications. Pt's wife reports Pt is planned for d/c to [facility] and had concerns about pain medication regimen in [facility] setting. LMSW educated Pt's wife on process in [facility]. Support rendered. will follow for continued support. [phone]
Palliative care consult completed and extensive interdisciplinary follow up. Spoke to spouse this am as well. Patient and family are clear that they want aggressive medical care, and skilled rehab. Pt continues to have delirium  at times, primary medical team managing. Family is hopeful and speaks to primary team and specialists regularly. Spouse and family aware of options for code status etc. And have our contact information.     Patient is a full code  Palliative care will sign off at this time  Clear goals of care, want STR for dc in SNF
Provider:                                              Met with: [  x ] Patient  [ x  ] Family  [   ] Other:         Primary Language: [  x ] English [   ] Other*:                      *Interpretation provided by:         SUPPORT DIAGNOSES            (Check all that apply)         [   ] EOL issues    [x   ] Advanced Illness    [   ] Cultural / spiritual concerns    [   ] Pain / suffering    [   ] Dementia / AMS    [   ] Other:    [   ] AD issues    [   ] Grief / loss / sadness    [   ] Discharge issues    [   ] Distress / coping         PSYCHOSOCIAL ASSESSMENT OF PATIENT         (Check all that apply)         [ X  ] Initial Assessment            [   ] Reassessment          [   ] Not Applicable this visit         Pain/suffering acuity:    [x   ] None to mild (0-3)           [   ] Moderate (4-6)        [   ] High (7-10)         Mental Status:    [   ] Alert/oriented (x3)          [ x  ] Confused/Altered(x2/x1)         [   ] Non-resp         Functional status:    [   ] Independent w ADLs      [   ] Needs Assistance             [  x ] Bedbound/Full Care         Coping:    [ x  ] Coping well                     [  ] Coping w/difficulty            [   ] Poor coping         Support system:    [  x ] Strong                              [   ] Adequate                        [   ] Inadequate              Past history and medications for:         [ ] Anxiety       [ ] Depression    [ ] Sleep disorders              SERVICE PROVIDED    [   ]Discharge support / facilitation    [   ]AD / goals of care counseling                                  [   ]EOL / death / bereavement counseling    [ X  ]Counseling / support                                                [   ] Family meeting    [   ]Prayer / sacrament / ritual                                      [   ] Referral    [   ]Other                                                                           NOTE and Plan of Care (PoC):    67M with PMH of HRrEF (15-20% EF) s/p AICD, DM1, HLD, HTN CAD s/p MI s/p CABG, PAD s/p 3 LLE stents, TIAGO, psoariasis, and R AKA here after mechanical fall, without acute trauma noted. Course c/b acute-on-chronic HFrEF and CRS, with type 2 NSTEMI due to demand ischemia, on diuresis. Also with mild transaminitis, likely from congestion. Palliative care consulted for GOC. Patient is full code.  LMSW met with Pt and his wife at bedside. Palliative Care role introduced and Pt's wife understood. Pt presented A&Ox2 with periods of confusion. Pt's wife noted she is Pt's primary caregiver at home. 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 Palliative Care contact provided. will follow for support. [phone]
Pt declined 25 units lantus dose stating that he takes 18 units of long acting insulin at home. Reordered lantus as 18 units at bedtime.    Pt also complaining of red, painful skin on tops of hands. Ordered 2.5% hydrocortisone ointment.
Registered Dietitian Follow-Up - assessment completed by XOCHILT Grimes     Patient Profile Reviewed                           Yes [x]   No []     Nutrition History Previously Obtained        Yes [x]  No []       Pertinent Medical Interventions: Pt presented to ED s/p fall. Found to have MOISES & mild elevation of troponin. Per progress notes, likely Acute systolic CHF complicated by MOISES (cardiorenal) and Troponinemia. Mild hyperkalemia noted resolved this admit. DM type 1 complicated by hypoglycemia at home - insulin protocol lantus and nutritional insulin was decreased per progress notes. Ascites & pleural effusion noted.    Pt was upgraded overnight; agitated this morning and decision was made to intubate and transfer to CCU. MOISES noted. Nephrology indicated no indication for RRT at this time. Multiorgan failure noted. HFrEF noted.    PMH includes HFrEF 15-20% s/p ICD, decreased RV function, mild MR/TR, DM type I w/ neuropathy and hx hypoglycemia , DL, HTN, CAD, hx MI, s/p CABG, s/p stent (), hx in-stent thrombosis while on Plavix, PAD s/p 3 LLE stents, TIAGO (needs CPAP auto-regulating pressure 10-16, patient doesn't use it due to claustrophobia), Psoriasis, R-AKA.     Diet order: Pt was previously receiving DASH/TLC + Consistent Carbohydrate (no snacks) diet this admit, but diet order has since been changed to NPO since ; pt currently intubated at this time.    Anthropometrics:  Height (cm): 177 (23 @ 13:26)  Adjusted IBW: 75.2 kg.    Daily Weight in k.4 ()    Additional daily wts: Daily wts: 80.2 kg (), 79.6 kg (), 79 kg (), 78 kg (), 77.3 kg (), 77.2 kg (), 70 kg (, )    Dosing wt noted to be 80.2 kg ()    ? etiology of wt changes observed this admit, with wt loss observed in daily wts compared to initial dosing wt. Of note, pt on diuretic this admit. Will continue to monitor wt trends.    BMI calculated using latest wt 71.4 kg (); adjusted for R AKA: 25.2    Pt reportedly with h/o significant wt loss from last year that was intentional (109.1 kg) but is uncertain of current UBW; however reports no known h/o unintentional wt loss. R AKA noted.    MEDICATIONS  (STANDING):  aspirin enteric coated 81 milliGRAM(s) Oral daily  buMETAnide Injectable 2 milliGRAM(s) IV Push two times a day  cefepime   IVPB 2000 milliGRAM(s) IV Intermittent every 24 hours  chlorhexidine 0.12% Liquid 15 milliLiter(s) Oral Mucosa every 12 hours  chlorhexidine 2% Cloths 1 Application(s) Topical daily  dexMEDEtomidine Infusion 0.2 MICROgram(s)/kG/Hr (3.25 mL/Hr) IV Continuous <Continuous>  dextrose 5%. 1000 milliLiter(s) (50 mL/Hr) IV Continuous <Continuous>  dextrose 5%. 1000 milliLiter(s) (100 mL/Hr) IV Continuous <Continuous>  dextrose 50% Injectable 25 Gram(s) IV Push once  dextrose 50% Injectable 12.5 Gram(s) IV Push once  dextrose 50% Injectable 25 milliLiter(s) IV Push once  dextrose 50% Injectable 25 Gram(s) IV Push once  DULoxetine 30 milliGRAM(s) Oral <User Schedule>  fentaNYL   Infusion. 0.5 MICROgram(s)/kG/Hr (3.25 mL/Hr) IV Continuous <Continuous>  folic acid 1 milliGRAM(s) Oral <User Schedule>  glucagon  Injectable 1 milliGRAM(s) IntraMuscular once  heparin   Injectable 5000 Unit(s) SubCutaneous every 12 hours  hydrocortisone 2.5% Cream 1 Application(s) Topical two times a day  insulin lispro (ADMELOG) corrective regimen sliding scale   SubCutaneous three times a day before meals  lactulose Syrup 10 Gram(s) Oral at bedtime  levothyroxine 25 MICROGram(s) Oral <User Schedule>  levothyroxine 50 MICROGram(s) Oral <User Schedule>  magnesium sulfate  IVPB 2 Gram(s) IV Intermittent once  magnesium sulfate  IVPB 2 Gram(s) IV Intermittent once  methotrexate (Non - oncologic) 10 milliGRAM(s) Oral every week  metoprolol succinate ER 25 milliGRAM(s) Oral daily  midodrine. 10 milliGRAM(s) Oral three times a day  milrinone Infusion 0.125 MICROgram(s)/kG/Min (2.44 mL/Hr) IV Continuous <Continuous>  norepinephrine Infusion 0.05 MICROgram(s)/kG/Min (6.09 mL/Hr) IV Continuous <Continuous>  pantoprazole    Tablet 40 milliGRAM(s) Oral before breakfast  polyethylene glycol 3350 17 Gram(s) Oral two times a day  prasugrel 10 milliGRAM(s) Oral daily  senna 2 Tablet(s) Oral at bedtime    MEDICATIONS  (PRN):  acetaminophen     Tablet .. 325 milliGRAM(s) Oral every 4 hours PRN Moderate Pain (4 - 6)  dextrose Oral Gel 15 Gram(s) Oral once PRN Blood Glucose LESS THAN 70 milliGRAM(s)/deciliter  oxyCODONE    IR 5 milliGRAM(s) Oral every 6 hours PRN Pain 4-10    Pertinent Labs:  @ 17:41: Na 147<H>, BUN 54<H>, Cr 3.5<H>, <H>, K+ 4.3, Mg 2.2   @ 09:40: Na 147<H>, BUN 54<H>, Cr 3.6<H>, <H>, K+ 5.0, Alk Phos 122<H>, ALT/SGPT 78<H>, AST/SGOT 152<H>   @ 08:05: Na 142, BUN 51<H>, Cr 3.4<H>, BG 71, K+ 7.4<HH>    Finger Sticks:  POCT Blood Glucose.: 188 mg/dL ( @ 23:05)  POCT Blood Glucose.: 146 mg/dL ( @ 17:25)  POCT Blood Glucose.: 87 mg/dL ( @ 12:52)  POCT Blood Glucose.: 66 mg/dL ( @ 11:03)  POCT Blood Glucose.: 66 mg/dL ( @ 09:15)    Physical Findings:  - Appearance: intubated; no edema noted  - GI function: last BM ; no GI issues noted at this time; no abd distention noted as per progress notes  - Tubes: no feeding tube present at this time  - Oral/Mouth cavity: NPO  - Skin: ecchymosis noted; no pressure injuries noted     Nutrition Requirements:  Weight Used: 71.4 latest wt     Estimated Energy Needs    Continue []  Adjust [x]  1728 kcal/day ([facility] 2003b using Ve 8.2 and Tmax 24 hours 37.4 C)        Estimated Protein Needs    Continue []  Adjust [x]  100-121 g/day (1.4-1.7 g/kg ABW)       Estimated Fluid Needs        Continue []  Adjust [x]  1428 mL/day (20 mL/kg ABW)     Nutrient Intake: Currently NPO, not receiving any nutrition support at this time.     No previous nutrition diagnosis.    *New Nutrition Diagnosis* Inadequate energy intake related to respiratory failure requiring intubation as evidenced by pt currently NPO, not receiving nutrition support at this time.     Nutrition Education: Deferred at this time in setting of intubation.     Goal/Expected Outcome: Nutrition support regimen initiated as soon as medically feasible; pt to demonstrate tolerance, meeting >85% & <105% of estimated nutrient needs over next 3-5 days.    Pt at high nutrition risk; RD to follow-up in 3-5 days.     Indicator/Monitoring: Skin, labs, BM, wt, nutrition focused physical findings, body composition, diet order, GI, BM, hemodynamic status, respiratory status.    Recommendation:  While pt remains intubated, when feeding tube access is available and medically feasible to initiate tube feeds, would initiate Peptamen AF at 10 mL/hr. Increase by 10 mL q4-6hrs as tolerated to goal rate 55 mL/hr. Regimen at goal to provide 1584 kcal, 99 g protein, 1069 mL free H2O. Provide 50 mL flushes q4hrs. Additional flushes per LIP. Monitor blood glucose levels and adjust insulin regimen as needed to maintain recommended critical care range of 140-180. Check PO4 daily.
Registered Dietitian Follow-Up Note     Patient Profile Reviewed                           Yes [x]   No []     Nutrition History Previously Obtained        Yes [x]  No []      Pertinent Subjective Information: Pt has fair appetite; consumed 50-75% of breakfast this morning. Total feed required. Tolerating diet texture/consistency well. No nausea or vomiting reported.      Pertinent Medical Information:   Per internal medicine note:   # Downgrade from CCU; Confused- intub for airway protection ; extub ; was in Acute HFrEF, cardiogenic and septic shocks 2/2 ESBL UTI, was on pressors and ionotropes-resolved now  # Acute on chronic HFrEF 20%; biventricular dysfxn; dilated CM; mod MR; mod-sev TR; HTN; CAD; MI; s/p CABG; s/p stent; PAD s/p stent and rt AKA  #Cardiogenic shock s/p swan catheter and ionotrops- now resolved  # Hospital delirum; poss underlying mild vasc-induced cog impairment    # Severe malnutrition; hypernatremia; hx of DISH causing dysphagia   -  puree + mild thick liquids, c/w supplements and MVs  - s/p Mod. barium swallow today- c/w current diet, pt. at high risk of aspiration, needs multiple swallows, monitor for weight loss and malnutrition, aspiration precautions  - Encourage oral hydration up to 1.5 L /day      · Per SLP note: 2023: Recommended Consistencies----->easy to chew and thin liquids    Diet order:   Diet, Easy to Chew:   Consistent Carbohydrate {No Snacks}  DASH/TLC {Sodium & Cholesterol Restricted}  1500mL Fluid Restriction ([id number])  Prosource Gelatein 20 Sugar Free     Qty per Day:  3 (23 @ 11:44) [Active]    Anthropometrics:  Weight: 65 KG on   BMI: 20.8  Height (cm): 177 (23 @ 13:26)  Adjusted IBW: 75.2 KG    Additional daily wts: Daily wts: 80.2 kg (), 79.6 kg (), 79 kg (), 78 kg (), 77.3 kg (), 77.2 kg (), 70 kg (, ),   Daily Weight in k.4 (), Weight in k.4 (), Weight in k.7 (06-10), Weight in k.6 (), Weight in k.5 (), Weight in k.6 (), 55.2 kg ( ) 55.3 kg ( )-- will continue to monitor wt trends.    BMI calculated using latest wt 71.4 kg (); adjusted for R AKA: 25.2    Pt reportedly with h/o significant wt loss from last year that was intentional (109.1 kg) but is uncertain of current UBW; however reports no known h/o unintentional wt loss. R AKA noted.    MEDICATIONS  (STANDING):  dextrose 5%. 1000 milliLiter(s) (100 mL/Hr) IV Continuous <Continuous>  dextrose 5%. 1000 milliLiter(s) (50 mL/Hr) IV Continuous <Continuous>  dextrose 50% Injectable 25 Gram(s) IV Push once  dextrose 50% Injectable 12.5 Gram(s) IV Push once  dextrose 50% Injectable 25 Gram(s) IV Push once  DULoxetine 30 milliGRAM(s) Oral <User Schedule>  folic acid 1 milliGRAM(s) Oral <User Schedule>  glucagon  Injectable 1 milliGRAM(s) IntraMuscular once  insulin glargine Injectable (LANTUS) 8 Unit(s) SubCutaneous every morning  insulin lispro (ADMELOG) corrective regimen sliding scale   SubCutaneous three times a day before meals  iron sucrose IVPB 200 milliGRAM(s) IV Intermittent every 24 hours  levothyroxine 25 MICROGram(s) Oral <User Schedule>  levothyroxine 50 MICROGram(s) Oral <User Schedule>  multivitamin/minerals 1 Tablet(s) Oral daily  pantoprazole    Tablet 40 milliGRAM(s) Oral before breakfast  spironolactone 12.5 milliGRAM(s) Oral daily  tamsulosin 0.4 milliGRAM(s) Oral at bedtime  torsemide 10 milliGRAM(s) Oral daily    MEDICATIONS  (PRN):  dextrose Oral Gel 15 Gram(s) Oral once PRN Blood Glucose LESS THAN 70 milliGRAM(s)/deciliter      Pertinent Labs: 6/15   sodium: 132, BUN: 33, Glucose: 180.    Physical Findings:  - Appearance: forgetful and disoriented per RN flow sheets   - GI function: no gastrointestinal signs/symptoms, Last bowel movement:    - Tubes: no feeding tube present at this time  - Oral/Mouth cavity: tolerating  easy to chew and thin liquids at this time   - Skin: no pressure injuries noted; no edema noted     Nutrition Requirements:  Weight Used: 65 KG latest wt -- with consideration for age, BMI, extubation     Estimated Energy Needs    Continue []  Adjust [x]  ENERGY: 3193-2677 kcal/day (25-30 kcal/kg)    Estimated Protein Needs    Continue []  Adjust [x]  PROTEIN: 78-91 g/day (1.2-1.4 g/kg)    Estimated Fluid Needs        Continue []  Adjust [x]  FLUID: 3135-1684 mL/day (25-30 mL/kg)    *New Nutrition Diagnosis* Inadequate energy intake related to respiratory failure requiring intubation as evidenced by pt currently NPO, not receiving nutrition support at this time -- RESOLVED, pt extubated     Nutrition Education: Deferred at this time; pt disoriented per flow sheet    Nutrition intake: consuming ~50% of meals      Goal/Expected Outcome: Pt to meet >75% of estimated needs within 5-7 days.     Indicator/Monitoring: Diet order, PO intake, weights, labs, NFPF, body composition, BM and tolerance to medical food supplements    Recommendation:  1. Continue Prosource Gelatein 20 SF 3X/DAILY to optimize kcal/pro intake -- provides 270 kcal/day total, 60g pro/day total  2. Continue with current diet order per SLP recs  3. Encourage PO intake and provide 1:1      Pt is at moderate nutrition risk; will f/u in 5-7 days or prn.
Registered Dietitian Follow-Up Note     Patient Profile Reviewed                           Yes [x]   No []     Nutrition History Previously Obtained        Yes [x]  No []      Pertinent Subjective Information: Pt has fair appetite; consumed 50-75% of breakfast this morning. Total feed required. Tolerating diet texture/consistency well. No nausea or vomiting reported.      Pertinent Medical Information: Pt is a 66 y/o male w/ PMHx of HFrEF 20% s/p AICD, decreased RV function, Dil CM; mild MR/TR, DM type I w/ neuropathy and hx hypoglycemia , DLD, HTN, CAD, hx MI, s/p CABG, s/p stent (), hx in-stent thrombosis while on Plavix, PAD s/p 3 LLE stents s/p rt AKA, TIAGO, Psoriasis, Rheum Arth on MTX; presenting to ED s/p fall.   #downgrade from CCU; intubation ; extubation ; was in cardiogenic and septic shocks    Per SLP note on 10-Nael-2023, recommend pureed with mildly thick liquids via small controlled cup sips.    Diet order: Diet, Pureed:   Consistent Carbohydrate {No Snacks}  DASH/TLC {Sodium & Cholesterol Restricted}  1500mL Fluid Restriction ([id number])  Mildly Thick Liquids (MILDTHICKLIQS) (06-10-23 @ 10:39) [Active]    Anthropometrics:  Weight: 65 KG on   BMI: 20.8  Height (cm): 177 (23 @ 13:26)  Adjusted IBW: 75.2 KG    Additional daily wts: Daily wts: 80.2 kg (), 79.6 kg (), 79 kg (), 78 kg (), 77.3 kg (), 77.2 kg (), 70 kg (, ),   Daily Weight in k.4 (), Weight in k.4 (), Weight in k.7 (06-10), Weight in k.6 (), Weight in k.5 (), Weight in k.6 () -- will continue to monitor wt trends.    BMI calculated using latest wt 71.4 kg (); adjusted for R AKA: 25.2    Pt reportedly with h/o significant wt loss from last year that was intentional (109.1 kg) but is uncertain of current UBW; however reports no known h/o unintentional wt loss. R AKA noted.    MEDICATIONS  (STANDING):  aspirin enteric coated 81 milliGRAM(s) Oral daily  bacitracin   Ointment 1 Application(s) Topical every 12 hours  chlorhexidine 2% Cloths 1 Application(s) Topical daily  dextrose 5%. 1000 milliLiter(s) (100 mL/Hr) IV Continuous <Continuous>  dextrose 5%. 1000 milliLiter(s) (50 mL/Hr) IV Continuous <Continuous>  dextrose 50% Injectable 25 Gram(s) IV Push once  dextrose 50% Injectable 12.5 Gram(s) IV Push once  dextrose 50% Injectable 25 Gram(s) IV Push once  DULoxetine 30 milliGRAM(s) Oral <User Schedule>  folic acid 1 milliGRAM(s) Oral <User Schedule>  gabapentin 100 milliGRAM(s) Oral every 12 hours  glucagon  Injectable 1 milliGRAM(s) IntraMuscular once  heparin   Injectable 5000 Unit(s) SubCutaneous every 12 hours  insulin glargine Injectable (LANTUS) 12 Unit(s) SubCutaneous every morning  insulin lispro (ADMELOG) corrective regimen sliding scale   SubCutaneous three times a day before meals  levothyroxine 25 MICROGram(s) Oral <User Schedule>  levothyroxine 50 MICROGram(s) Oral <User Schedule>  meropenem  IVPB 1000 milliGRAM(s) IV Intermittent every 12 hours  multivitamin/minerals 1 Tablet(s) Oral daily  pantoprazole    Tablet 40 milliGRAM(s) Oral before breakfast  polyethylene glycol 3350 17 Gram(s) Oral two times a day  prasugrel 10 milliGRAM(s) Oral daily  senna 2 Tablet(s) Oral at bedtime  torsemide 20 milliGRAM(s) Oral <User Schedule>    MEDICATIONS  (PRN):  acetaminophen     Tablet .. 325 milliGRAM(s) Oral every 4 hours PRN Moderate Pain (4 - 6)  dextrose Oral Gel 15 Gram(s) Oral once PRN Blood Glucose LESS THAN 70 milliGRAM(s)/deciliter    Pertinent Labs: 6/10 @ 12:18 - H/H 11/39.1; ; AST/SGOT 77    Finger Sticks:  CAPILLARY BLOOD GLUCOSE  POCT Blood Glucose.: 119 mg/dL (2023 07:43)  POCT Blood Glucose.: 138 mg/dL (2023 21:11)  POCT Blood Glucose.: 198 mg/dL (2023 16:54)  POCT Blood Glucose.: 143 mg/dL (2023 11:55)    Physical Findings:  - Appearance: disoriented, garbled  - GI function: last BM on   - Tubes: no feeding tube present at this time  - Oral/Mouth cavity: tolerating pureed with mildly thick liquids  - Skin: no pressure injuries noted; no edema noted     Nutrition Requirements:  Weight Used: 65 KG latest wt -- with consideration for age, BMI, extubation     Estimated Energy Needs    Continue []  Adjust [x]  ENERGY: 0588-0044 kcal/day (25-30 kcal/kg)    Estimated Protein Needs    Continue []  Adjust [x]  PROTEIN: 78-91 g/day (1.2-1.4 g/kg)    Estimated Fluid Needs        Continue []  Adjust [x]  FLUID: 4112-2693 mL/day (25-30 mL/kg)    *New Nutrition Diagnosis* Inadequate energy intake related to respiratory failure requiring intubation as evidenced by pt currently NPO, not receiving nutrition support at this time -- RESOLVED, pt extubated     Nutrition Education: Deferred at this time; pt disoriented per flow sheet     Goal/Expected Outcome: Pt to meet >75% of estimated needs within 5-7 days.     Indicator/Monitoring: Diet order, PO intake, weights, labs, NFPF, body composition, BM and tolerance to medical food supplements    Recommendation:  1. ADD Prosource Gelatein 20 SF 3X/DAILY to optimize kcal/pro intake -- provides 270 kcal/day total, 60g pro/day total  2. Continue with current diet order per SLP recs  3. Encourage PO intake and provide 1:1  4. Adjust insulin regimen prn    Pt is at moderate nutrition risk; will f/u in 5-7 days or prn.    RD to remain available: Mary Szymanski [phone] or [phone]
Dx :       - Room air pulse ox. at rest:  87%   - pulse ox on 2 lit O2 at rest: 94%       Patient will require home O2 for discharge.  Patient is aware and agreeable to home O2.  Patient is in a chronic stable state of CHF
LMSW met with Pt, wife and children at bedside to offer support. Pt's daughter noted that Pt is currently planned to wean off sedation and possibly start breathing trial. Pt's family requested information about different options if Pt is not able to wean off vent. LMSW reviewed possible trach placement verses CMO and Palliative extubation. Pt's family understood. Palliative Care contact provided to Pt's family at bedside. Will continue to follow for on-going GOC discussions and provide support. [phone]
NAZANINSW met with PT and family at bedside. Pt was extubated this AM and on bipap. Pt resting and comfortable on bipap. will continue to follow for on-going support. [phone]
PALLIATIVE MEDICINE INTERDISCIPLINARY TEAM NOTE    Provider:                Family or contact name / phone #     Met with: [ x  ] Patient  [   ] Family  [   ] Other:    Primary Language: [ x  ] English [   ] Other*:                      *Interpretation provided by:    SUPPORT DIAGNOSES            (Check all that apply)  [  x ] Spiritual assessment  [   ] EOL issues  [   ] Cultural / spiritual concerns  [   ] Pain / suffering  [   ] Dementia / AMS  [   ] Other:  [   ] AD issues  [   ] Grief / loss / sadness  [   ] Discharge issues  [ x  ] Distress / coping    SPIRITUAL ASSESSMENT    [   ] Initial Assessment            [ x  ] Reassessment          [   ] Not Applicable this visit    Pain/suffering acuity:  [ x  ] None to mild (0-3)           [   ] Moderate (4-6)        [   ] High (7-10)    Coping:  [   ] Coping well                     [ x  ] Coping w/difficulty            [   ] Poor coping    Support system:  [   ] Strong                              [   ] Adequate                        [   ] Inadequate    Mandaen/Spiritual practice: ___________________________    Role of organized [employer]aritan:  [   ] Important                     [x   ] Some (fam tradition, cultural)               [   ] None    Effects on medical care:  [   ] Yes, _____________________________________                         [ x  ] None    Cultural/Buddhist need:  [   ] Yes, _____________________________________                         [ x  ] None    Refer to Pastoral Care:  [   ] Yes           [x ] No, not at this time    SERVICE PROVIDED  [   ]PSSA                                                                      [   ]Discharge support / facilitation  [   ]AD / goals of care counseling                                  [   ]EOL / death / bereavement counseling  [ x  ]Counseling / support                                              [   ] Family meeting  [ x  ]Prayer / sacrament / ritual                                      [   ] Referral   [   ]Other                                                                       NOTE and Plan of Care (PoC): This was a follow up visit. Pt states he's comfortable. No family were at bedside at time of visit, I will follow up for support.

## 2023-06-19 NOTE — PROGRESS NOTE ADULT - SUBJECTIVE AND OBJECTIVE BOX
FLOWER MARISCAL  67y  Male  ***My note supersedes ALL resident notes that I sign.  My corrections for their notes are in my note.***    I can be reached directly on [email or web address]. My office number is [phone]. My personal cell number is [phone].    INTERVAL EVENTS: Here for f/u of delirium. Pt's MS is back to baseline. He did not remember, today, from about a week ago. Nonetheless, his MS is back to baseline. He is fully coherent and speaking normally. BP is a little low - prob from diuresis. Left leg remains chronically contracted.    T(F): 97.3 (06-18-23 @ 21:20), Max: 97.3 (06-18-23 @ 21:20)  HR: 96 (06-19-23 @ 05:00) (93 - 96)  BP: 83/55 (06-19-23 @ 05:00) (83/55 - 105/67)  RR: 18 (06-19-23 @ 05:00) (18 - 18)  SpO2: 96% (06-18-23 @ 21:20) (96% - 97%)    Gen: NAD; pleasant  skin: excoriations on arms/hands and left leg are less and improving  HEENT: PERRL, EOMI, mouth clr, nose clr  Neck: no nodes, no JVD, thyroid nl  chest: lt AICD  lungs: clr  hrt: s1 s2 reg; tachy; 2/6 sys murmur apex  abd: soft, NT/ND, no HS megaly  ext: no edema, no c/c  rt AKA - stump OK  rt upper/lateral arm: + mod lg lipoma  lt leg w/ signif flexion contracture (prevents standing); has small ulcers on dorsal base of 1st toe; and small ulcers on tips of toes 2 and 4  neuro: aa, ox3, cn grossly intact, can move arms fine; rt aka; lt leg w/ flex contract    LABS:  CAPILLARY BLOOD GLUCOSE  POCT Blood Glucose.: 203 (06-19-23 @ 11:43)  POCT Blood Glucose.: 131 (06-19-23 @ 07:59)  POCT Blood Glucose.: 193 (06-18-23 @ 21:52)  POCT Blood Glucose.: 169 (06-18-23 @ 16:40)  POCT Blood Glucose.: 178 (06-18-23 @ 11:41)  POCT Blood Glucose.: 164 (06-18-23 @ 07:46)  POCT Blood Glucose.: 198 (06-17-23 @ 21:31)  POCT Blood Glucose.: 193 (06-17-23 @ 17:19)    RADIOLOGY & ADDITIONAL TESTS:  < from: Xray Chest 1 View- PORTABLE-Routine (Xray Chest 1 View- PORTABLE-Routine in AM.) (06.16.23 @ 06:14) >  Impression:    Improving pulmonary venous congestion with residual left pleural   effusion. No pneumothorax    < end of copied text >    < from: US Kidney and Bladder (06.13.23 @ 21:02) >  IMPRESSION:    Mild fullness of the left renal collecting system.    Otherwise unremarkable.    < end of copied text >    MEDICATIONS:    acetaminophen     Tablet .. 325 milliGRAM(s) Oral every 4 hours PRN  aspirin enteric coated 81 milliGRAM(s) Oral daily  bacitracin   Ointment 1 Application(s) Topical every 12 hours  chlorhexidine 2% Cloths 1 Application(s) Topical daily  DULoxetine 30 milliGRAM(s) Oral <User Schedule>  folic acid 1 milliGRAM(s) Oral <User Schedule>  gabapentin 100 milliGRAM(s) Oral every 12 hours  heparin   Injectable 5000 Unit(s) SubCutaneous every 12 hours  insulin glargine Injectable (LANTUS) 8 Unit(s) SubCutaneous every morning  insulin lispro (ADMELOG) corrective regimen sliding scale   SubCutaneous three times a day before meals  iron sucrose IVPB 200 milliGRAM(s) IV Intermittent every 24 hours  levothyroxine 25 MICROGram(s) Oral <User Schedule>  levothyroxine 50 MICROGram(s) Oral <User Schedule>  multivitamin/minerals 1 Tablet(s) Oral daily  pantoprazole    Tablet 40 milliGRAM(s) Oral before breakfast  prasugrel 10 milliGRAM(s) Oral daily  spironolactone 12.5 milliGRAM(s) Oral daily  tamsulosin 0.4 milliGRAM(s) Oral at bedtime  torsemide 10 milliGRAM(s) Oral daily

## 2023-06-19 NOTE — CHART NOTE - NSCHARTNOTESELECT_GEN_ALL_CORE
Event Note
Event Note
Palliative Care- Social Work/Event Note
Palliative Care- Social Work/Event Note
Dietitian Follow-Up/Event Note
Event Note
Follow Up/Nutrition Services
Palliative Care Sign OFF/Event Note
Palliative Care- Social Work/Event Note
Palliative Care- Social Work/Event Note
RD/Event Note
Transfer Note

## 2023-06-19 NOTE — PROGRESS NOTE ADULT - REASON FOR ADMISSION
Mechanical fall

## 2023-06-19 NOTE — PROGRESS NOTE ADULT - TIME BILLING
Total time spent to complete patient's bedside assessment, review medical chart, discuss medical plan of care with  medical team was more than 55 minutes  with >50% of time spent face to face with patient, discussion with patient/family and/or coordination of care.
d/c plan
I have personally seen and examined this patient.    I have reviewed all pertinent clinical information and reviewed all relevant imaging and diagnostic studies personally.   I counseled the patient about diagnostic testing and treatment plan. All questions were answered.   I discussed recommendations with the primary team.
Total time spent to complete patient's bedside assessment, review medical chart, discuss medical plan of care with  medical team was more than 60 minutes  with >50% of time spent face to face with patient, discussion with patient  and/or coordination of care.
1st day for me on very complex case. Long d/w family. Long d/w w/ residents/RN.

## 2023-06-19 NOTE — PROGRESS NOTE ADULT - CONVERSATION DETAILS
PT wishes to remain full code. He and his wife understand that while stable right now he is clinically borderline critically ill.
pt and family want full medical support w/out restriction. Full code.
Pt is still full code. He wants all medical care w/out restrictions.
Spoke to Raine his spouse    Patient confused at times today. Spoke to spouse who is distressed about " when is he going to get better" Discussed that the patient has very advanced illness and likely cannot get better. But that the medical team is trying to optimize him. Discussed that he could be hospice appropriate. And she was a bit taken aback but when we reviewed how frequently he has been hospitalized and how advanced his illness is she agreed that he does seem to be nearing an end of life stage.       But they def want him to live until their daughters weeding in September, and I deferred to the medical and heart failure teams to discuss his options to optimize his care. I supported her because she and her  are en meshed together. She said in the past her mom was on hospice 4 years ago and it was a good experience overall with the support.     Agreed to reach out to primary team and HF team on her behalf.
Introduced palliative care to spouse Raine    She was feeling overwhelmed, and anxious about her husbands condition. Asked about pt's functional status/health statu over the last 6month - 1 yr. She reports that he needs a lot of assistance at home. Said he is alert and oriented at home but refuses to take his meds, and has major mood swings.     Talked about his overall health. She said she  listens to and follows everything the doctors tell them. She is waiting for the cardiologist to give them a prognosis. When I explained advanced illness she seemed confused although she acknowledged he has been sick and in the hospital often.     She is afraid of not being able to care for him. But said in the nursing home he " goes nuts " Support provided.
Palliative care NP met with patient's wife and daughter - step down MD present    Palliative np and attending MD spoke to both wife and daughter re: overall condition and treatment. Also discussing next steps now that pt is intubated and on a Milrinone drip. Family happy that pt looks comfortable. They described his worsening agitation and seeming suffering all weekend. But they are sad and struggling because they are aware he has a poor prognosis for a functional recovery and may pass during this hospital stay. Support provided.     Palliative NP offered discussion of goals of care more after seeing how pt responds to Milranone drip and if he can be weened off pressors and vent support. They were given a step wise explanation of how critical care will try to ween him and see how he does overall.

## 2023-06-19 NOTE — PROGRESS NOTE ADULT - ASSESSMENT
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 He states he was lying in bed at home when he fell asleep and fell off the bed. Found to have moises and mild elevation of trop   patient hospitalization was complicated with cardiogenic and septic shock requiring treatment in CCU. - intubated for airway protection 6/5; extub 6/8; was in Acute HFrEF, cardiogenic and septic shocks 2/2 ESBL UTI, was on pressors and ionotropes  Patient downgraded to floor once stable    # delirium resolved    # Acute on chronic HFrEF 20%;   # Cardiogenic shock - s/p swan catheter and ionotrops- now resolved  - cardio and HF following  - (pt allergic to ACEI and Entresto)  - daily wts, Is and Os  - HF following - treated with IV diuretics  BP low: decr Torsemide 10mg q24 and cont aldactone 12.5mg po q24 - hold either/both for BP < 90/55  can take extra diuretic if a weight gain of > 2-3 lbs or more in 1-3 days  - OP f/u with HF in 1-2 wks after dc  - hold losartan for hypotension    # Acute Urinary retention s/p fields placement on 6/13  - Urology evaluated- recommendation reviewed- pt. follows with Dr. connelly OP,  recommended CIC (pt refusing)  - TOV passing: pt voiding a good amt (but bladder scan also w/ 300-400cc residuals at times - keep off fields for now    # CAD; MI; s/p CABG; s/p stent  # PAD s/p stent and rt AKA  c/w DAPT: asa + prasugrel (pt allergic to plavix)    # mod MR; mod-sev TR    # diarrhea-improved  laxatives held    # Severe malnutrition  # dysphagia   speech following;  advance diet per recommendation    # Septic shock 2/2 UTI 2/2 mult strains ESBL Kleb- resolved  ID eval appreciated- s/p abx: laquita 1gm iv q12 till 6/14   BCx neg    # Skin excoriations; periph neuropathy 2/2 DM and vasc  - bacitracin topical  - MRSA nares +: s/p bactroban oint to nares top q12 for 5 days (6/7-6/12)  - sponge bath  - c/w neurontin 100mg po q12  - c/w Duloxetine 30mg 3x/day  - c/w wound care per wound care team    # MOISES on CKD3- resolved    # Anemia of chr dz plus iron def anemia  hg stable  - iron sat 3%; ferr 56 s/p venofer x5days  - oral iron as outpt  - will need outpatient GI eval once stable as outpt for awhile    # DM T2 w/ neuropathy  - c/w diabetic DASH diet  - FS qac/hs - goal 100-180  - lantus 8 units AM - might need a little more  - SSI     # Hypothyroid; anti TPO Ab+ >>c/w synthroid 25 mcg q24 and 50 on Sunday  - TSH 5.6 in 1/2023- outpt f/u    # DLD  - on crestor at home and pt allergic to lipitor- statins on hold here: can resume crestor as outpt  - Lipid panel wnl  - LFTs were prob abnl from hepatic congestion - LFTs improving    # Rheum Arth- c/w MTX 10mg po q wed + folate 1mg po q24;  Holding Pred 5mg po q24    # TIAGO- CPAP not in use 2/2 claustrophobia    # BPH- c/w Flomax 0.4 HS    # Anxiety- on valium 1-2mg at home prn - hold off for now    # Activity: PT to follow    # DVT ppx: Hep sc q12    # GI ppx: PPI to 40mg po q24     # Code Status: Full    Dispo: d/c to SNF in [place] ([place]) today

## 2023-07-27 NOTE — HISTORY OF PRESENT ILLNESS
[FreeTextEntry1] : 68 y/o male with a PMHx of  HFrEF LVEF 15-20% s/p ICD, CAD, hx MI, s/p CABG, s/p stent (2018), PAD s/p 3 LLE stents, TIAGO non-compliant w/ CPAP, R-AKA, DM type I, DL, and HTN. Recently admitted to [facility] from 5/18 to 6/19 s/p fall, and ADHF. Hospital course c/b AMS, elevated lactate with worsening renal/liver function. Patient was intubated 6/05 for airway protection, started empirically on inotropic support, he was extubated on 6/08. He is currently in a rehab facility and is here for a hospital follow-up. \par \par The patient reports feeling well, he does not get SOB with minimal activity. His SBP in the facility fluctuates from the 90s to the 100s, and his weight is 137 lbs. The patient denies CP, bleeding, SOB at rest, PND, abdominal discomfort, LH/dizziness, palpitations, and syncope.  \par

## 2023-07-31 NOTE — BEHAVIORAL HEALTH ASSESSMENT NOTE - ORIENTED TO PERSON
What Type Of Note Output Would You Prefer (Optional)?: Bullet Format Is This A New Presentation, Or A Follow-Up?: Rash Yes

## 2023-08-22 PROBLEM — D64.9 ANEMIA, MILD: Status: ACTIVE | Noted: 2018-01-19

## 2023-08-22 PROBLEM — I25.5 CARDIOMYOPATHY, ISCHEMIC: Status: ACTIVE | Noted: 2020-04-20

## 2023-08-22 PROBLEM — I25.9 CHRONIC ISCHEMIC HEART DISEASE: Status: ACTIVE | Noted: 2020-04-20

## 2023-08-23 NOTE — HISTORY OF PRESENT ILLNESS
[FreeTextEntry1] : 67 y/o male with a PMHx of  HFrEF LVEF 15-20% s/p ICD, CAD, hx MI, s/p CABG, s/p stent (2018), PAD s/p 3 LLE stents, TIAGO non-compliant w/ CPAP, R-AKA, DM type I, DL, and HTN. Recently admitted to [facility] from 5/18 to 6/19 s/p fall, and ADHF. Hospital course c/b AMS, elevated lactate with worsening renal/liver function. Patient was intubated 6/05 for airway protection, started empirically on inotropic support, he was extubated on 6/08. He was discharged to a rehab facility which he left last week. He is coming today for a routine follow up.   He is accompanied by his wife. He reports feeling well. No SOB/ chest pain, LOC, NV. He moves around in a WC due to right AKA. He seems euvolemic on exam.

## 2023-08-23 NOTE — ASSESSMENT
[FreeTextEntry1] :  HFrEF/Anemia/ right AKA/ CKD   Euvolemic on exam Labs from two weeks ago showed creat 1.3 Decrease torsemide to 20 mg daily, take second tablet only for e/o swelling - he can't check his weight accurately  Increase Metoprolol ER to 25 mg twice daily  Switch Hydralazine to losartan 25 mg daily  Offered him Cardiomems for accurate monitoring of fluid status given inability  Blood work before next OV Follow up with PCP, and general cards RTO in 8 weeks   Barbie Martin MD, FACC, FHFSA  Advanced Heart Failure/ Mechanical Circulatory Support Pulmonary Hypertension and Cardiac Amyloidosis  [facility]

## 2023-09-01 NOTE — H&P ADULT - NSHPSOCIALHISTORY_GEN_ALL_CORE
Remote transmission received from patient's dual chamber pacemaker monitor at home. Transmission shows normal sensing and pacing function. Noted AT/AF/L, 0.4% burden, 0 of 1 Pace-Terminated episodes (Eliquis, sotalol). Ap 75.2%   27.4% (MVP On)  PVCs 3.2/hr  Echo 03.10.23 showed EF of 55-60%. End of 91-day monitoring period 7/24/23. EP physician will review. See interrogation under cardiology tab in the [address] field for more details. Will continue to monitor remotely. Pt denies any tobacco use, alcohol use, or illicit drug use

## 2023-09-12 NOTE — CONSULT NOTE ADULT - CONSULT REQUESTED BY NAME
Medicine Social work / Discharge Planning:         Social work attempted to see patient for initial assessment but he was not available. Chart reviewed. Per therapy evaluation, patient lives with his wife and ambulates independently. Per IDR, patient uses 4 liters of oxygen at home. AM PAC 24/24. Currently on IV solumedrol. No discharge needs identified at this time.    Electronically signed by SANDRA Downs on 9/12/2023 at 3:22 PM Writer contacted [facility] pharmacy, [place] to find out if patient has picked up the Retin-A 0.04% gel. Per pharmacy patient hasn't picked it up.     Do you wish to change dosage of brand name Retin-A? Per [facility] only dosage of brand name Retin-A not covered is the 0.04%.

## 2023-09-22 PROBLEM — E11.42 DIABETIC PERIPHERAL NEUROPATHY: Status: ACTIVE | Noted: 2018-01-16

## 2023-09-22 PROBLEM — E03.9 ADULT HYPOTHYROIDISM: Status: ACTIVE | Noted: 2022-04-14

## 2023-09-23 PROBLEM — E10.9 INSULIN DEPENDENT TYPE 1 DIABETES MELLITUS: Status: ACTIVE | Noted: 2021-11-22

## 2023-09-24 NOTE — ED ADULT TRIAGE NOTE - NS ED NURSE BANDS TYPE
· Patient with PMHx of asthma, chronic back pain, and anxiety presented to the ED with complaint of increased swelling and pain of right eye and maxillary region x 2 days. · Pt reports that he was stung by a bee earlier in the month leading to an itchy welt that has not healed secondary to scratching. He states the area became swollen, erythematous, and warm 2 days ago worsening since, now with right eye swelling and blurred vision at times. +myalgias, chills, 9/10 headache with sinus pressure  o Patient presented to the ED fulfilling septic criteria with tachycardia and leukocytosis of 13.49  · CT facial bones demonstrating "small rim-enhancing collection in the right paranasal soft tissues (axial image 44, series 3) measures approximately 1.1 cm in size, suspicious for small abscess. There are surrounding inflammatory changes and fat stranding in this region which extends along the anterior right premaxillary region and the right inferior periorbital region suggesting associated premaxillary and preseptal cellulitis. No discrete evidence of post septal cellulitis.  Shotty right submandibular and cervical chain lymph nodes, possibly reactive."   • Lactic acid 0.7  • Procalc 0.05, repeat in AM  • Blood cultures pending   • Continue supportive care, pain control  • Will give migraine cocktail for severe headache  • S/p 2L fluid bolus in the ED, continue fluids  • ED contacted oncall ENT, recommendations appreciated  o 10mg decadron q8 x 3 doses  o Unasyn and vancomycin  o Consulted Name band;

## 2023-10-02 PROBLEM — Z45.02 ENCOUNTER FOR ADJUSTMENT OF CARDIAC RESYNCHRONIZATION THERAPY DEFIBRILLATOR (CRT-D): Status: ACTIVE | Noted: 2023-01-01

## 2023-10-02 PROBLEM — I47.29 NSVT (NONSUSTAINED VENTRICULAR TACHYCARDIA): Status: ACTIVE | Noted: 2023-01-01

## 2023-10-13 NOTE — OCCUPATIONAL THERAPY INITIAL EVALUATION ADULT - AMBULATORY DEVICES NEEDED
Received pt at 0700, Aox4, denies pain. On 2L O2 nc, bilateral lung sounds clear and diminished, reports shortness of breath on exertion, denies chest pain. NSR. Pt continent [place]. Pt. Updated on plan of care. Call light within reach and questions answered.        Problem: Diabetes/Glucose Control  Goal: Glucose maintained within prescribed range  Description: INTERVENTIONS:  - Monitor Blood Glucose as ordered  - Assess for signs and symptoms of hyperglycemia and hypoglycemia  - Administer ordered medications to maintain glucose within target range  - Assess barriers to adequate nutritional intake and initiate nutrition consult as needed  - Instruct patient on self management of diabetes  Outcome: Progressing     Problem: Patient/Family Goals  Goal: Patient/Family Long Term Goal  Description: Patient's Long Term Goal: Stay out of the hospital     Interventions:  - Follow up appointments  - Med compliance  - See additional Care Plan goals for specific interventions  Outcome: Progressing  Goal: Patient/Family Short Term Goal  Description: Patient's Short Term Goal: Feel better    Interventions:   - Med administration, diuresis   - See additional Care Plan goals for specific interventions  Outcome: Progressing wc/yes

## 2023-10-20 NOTE — H&P ADULT - NSCORESITESY/N_GEN_A_CORE_RD
Problem: Adult Inpatient Plan of Care  Goal: Plan of Care Review  Outcome: Ongoing, Progressing  Goal: Patient-Specific Goal (Individualized)  Outcome: Ongoing, Progressing  Goal: Absence of Hospital-Acquired Illness or Injury  Outcome: Ongoing, Progressing  Goal: Optimal Comfort and Wellbeing  Outcome: Ongoing, Progressing  Goal: Readiness for Transition of Care  Outcome: Ongoing, Progressing     Problem: Fatigue  Goal: Improved Activity Tolerance  Outcome: Ongoing, Progressing     Problem: Pain Acute  Goal: Acceptable Pain Control and Functional Ability  Outcome: Ongoing, Progressing     Problem: Infection  Goal: Absence of Infection Signs and Symptoms  Outcome: Ongoing, Progressing     Problem: Heart Failure Comorbidity  Goal: Maintenance of Heart Failure Symptom Control  Outcome: Ongoing, Progressing     
No

## 2023-10-24 PROBLEM — I50.22 CHRONIC SYSTOLIC CONGESTIVE HEART FAILURE: Status: ACTIVE | Noted: 2018-05-30

## 2023-10-26 PROBLEM — M25.50 MULTIPLE JOINT PAIN: Status: ACTIVE | Noted: 2023-01-01

## 2023-10-26 PROBLEM — I27.20 PULMONARY HYPERTENSION: Status: ACTIVE | Noted: 2020-01-17

## 2023-10-26 PROBLEM — D50.9 IRON DEFICIENCY ANEMIA, UNSPECIFIED IRON DEFICIENCY ANEMIA TYPE: Status: ACTIVE | Noted: 2019-10-16

## 2023-11-04 NOTE — ED PROVIDER NOTE - CARE PROVIDER_API CALL
Juan Pablo Nolen  Vascular Surgery  [address], [address]  [place] [address]  Phone: [phone]  Fax: [phone]  Follow Up Time:     Hector Vega  Plastic Surgery  [address] [place] [address]  Phone: [phone]  Fax: [phone]  Follow Up Time:

## 2023-11-04 NOTE — ED PROVIDER NOTE - PATIENT PORTAL LINK FT
You can access the FollowMyHealth Patient Portal offered by [facility] by registering at the following website: [email or web address]. By joining [employer]’s FollowMyHealth portal, you will also be able to view your health information using other applications (apps) compatible with our system.
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
## 2023-11-04 NOTE — ED PROVIDER NOTE - ATTENDING APP SHARED VISIT CONTRIBUTION OF CARE
Pt has multiple ulcerations to the left leg. hx of diabetes, takes methotrexate, s/p right right BKA. Pt thinks the ulcer on the knee is becoming infected. Multiple chronic ulcer are over one year. Pt takex methotrexate for a rash. On exam several ulceration with scabs, and granulation tissue. No signs of acute infection. S1S2 rrr, lungs clear.

## 2023-11-04 NOTE — ED PROVIDER NOTE - CLINICAL SUMMARY MEDICAL DECISION MAKING FREE TEXT BOX
Pt with multiple chronic ulceration and poor healing. Risks include diabetes and pt is taking methotrexate. This was discussed with the pt. Should see rheumatology to consider stopping methotrexate. Arterial studies good.

## 2023-11-04 NOTE — ED ADULT NURSE NOTE - NSFALLRISKINTERV_ED_ALL_ED
Assistance OOB with selected safe patient handling equipment if applicable/Communicate fall risk and risk factors to all staff, patient, and family/Monitor gait and stability/Provide patient with walking aids/Provide visual cue: yellow wristband, yellow gown, etc/Reinforce activity limits and safety measures with patient and family/Call bell, personal items and telephone in reach/Instruct patient to call for assistance before getting out of bed/chair/stretcher/Non-slip footwear applied when patient is off stretcher/[place] to call system/Physically safe environment - no spills, clutter or unnecessary equipment/Purposeful Proactive Rounding/Room/bathroom lighting operational, light cord in reach

## 2023-11-04 NOTE — ED PROVIDER NOTE - NSFOLLOWUPINSTRUCTIONS_ED_ALL_ED_FT
Please follow up with Dr. Nolen Vascular surgery, Dr. Vega Burn for wound care, and your podiatrist. Please take Doxycycline 100mg every 12 hours for 10 days. Return to the ED with redness, wound drainage, pain, fevers, or other new/concerning symptoms.     Our Emergency Department Referral Coordinators will be reaching out to you in the next 24-48 hours from 9:00am to 5:00pm with a follow up appointment. Please expect a phone call from the hospital in that time frame. If you do not receive a call or if you have any questions or concerns, you can reach them at [phone]

## 2023-11-04 NOTE — ED PROVIDER NOTE - PHYSICAL EXAMINATION
Physical Exam    Constitutional: No acute distress.   Eyes: Conjunctiva pink, Sclera clear, PERRLA, EOMI.  ENT: No sinus tenderness. No nasal discharge. No oropharyngeal erythema, edema, or exudates. Uvula midline.   Cardiovascular: Regular rate, regular rhythm. No noted murmurs rubs or gallops.  Respiratory: unlabored respiratory effort, clear to auscultation bilaterally no wheezing, rales or rhonchi  Gastrointestinal: Normal bowel sounds. soft, non distended, non-tender abdomen.   Musculoskeletal: supple neck, no midline tenderness. Multiple chronic appearing scabbed wounds throughout left lower extremity including thigh, lower leg, and foot. Pulses intact. No skin erythema or wound drainage.   Integumentary: warm, dry, no rash  Neurologic: awake, alert, cranial nerves II-XII grossly intact, extremities’ motor and sensory functions grossly intact  Psychiatric: appropriate mood, appropriate affect

## 2023-11-04 NOTE — ED PROVIDER NOTE - OBJECTIVE STATEMENT
68 year old male with a history of w/ HFrEF 20% s/p AICD, decreased RV function, Dil CM; mild MR/TR, DM type I w/ neuropathy and hx hypoglycemia , DLD, HTN, CAD, hx MI, s/p CABG, s/p stent (2018), hx in-stent thrombosis while on Plavix, PAD s/p 3 LLE stents s/p rt AKA, TIAGO (needs CPAP auto-regulating pressure 10-16, patient doesn't use it due to claustrophobia), Psoriasis, Rheum Arth on MTX presents tot he ED with left leg wounds. Patient reports that he has developed multiple wounds throughout left lower extremity over the past few months. Wounds are dry and scabbed not associated with severe pain. Wounds involve the thigh, lower leg, and foot. He is able to bear some weight. Denies discoloration of the left foot, wound drainage, associated trauma, fever, chills, chest pain, shortness of breath, abdominal pain, nausea, vomiting, diarrhea, constipation, dysuria, hematuria, lower extremity swelling.

## 2023-11-04 NOTE — ED PROVIDER NOTE - CARE PROVIDERS DIRECT ADDRESSES
[email or web address]

## 2023-11-04 NOTE — ED ADULT TRIAGE NOTE - NS ED NURSE BANDS TYPE
Radiology Nursing Department ([phone] - after hours, [phone] 7am-5pm, [phone] non-emergent voicemail     Name band;

## 2023-11-21 NOTE — DISCHARGE NOTE NURSING/CASE MANAGEMENT/SOCIAL WORK - NSPROEXTENSIONSOFSELF_GEN_A_NUR
eyeglasses Cyclophosphamide Counseling:  I discussed with the patient the risks of cyclophosphamide including but not limited to hair loss, hormonal abnormalities, decreased fertility, abdominal pain, diarrhea, nausea and vomiting, bone marrow suppression and infection. The patient understands that monitoring is required while taking this medication.

## 2023-11-27 NOTE — PATIENT PROFILE ADULT - NSPROALCOHOLUSE2_GEN_A_NUR
[facility] Hematology/Oncology  PROGRESS NOTE -  Follow-up Visit      Subjective:       Patient ID:   NAME: Lorraine Warner : 1951     72 y.o. female    Referring Doc: Judith  Other Physicians: zAeb Kumar    Chief Complaint:  gastric submucosal nodule/GIST f/u        History of Present Illness:     Patient returns today for a  regularly scheduled follow-up visit.  The patient is here today to go over the results of the recently ordered labs, tests and studies. She is here with her .    She had scope on 10/7/2022 with Dr Asif and plans to see him in near future (2024)    She last had CT scans in 2023    She is otherwise doing ok with no new issues.   She denies any CP, SOB, HA's or N/V.         Discussed covid19 precautions - she has not been vaccinated; she had covid in Dec 2021 and recovered      ROS:   GEN: normal without any fever, night sweats or weight loss  HEENT: normal with no HA's, sore throat, stiff neck, changes in vision  CV: normal with no CP, SOB, PND, MARSHALL or orthopnea  PULM: normal with no SOB, cough, hemoptysis, sputum or pleuritic pain  GI: normal with no abdominal pain, nausea, vomiting, constipation, diarrhea, melanotic stools, BRBPR, or hematemesis; some reflux residualr  : normal with no hematuria, dysuria  BREAST: normal with no mass, discharge, pain  SKIN: normal with no rash, erythema, bruising, or swelling    Allergies:  Review of patient's allergies indicates:  No Known Allergies    Medications:    Current Outpatient Medications:     amLODIPine (NORVASC) 5 MG tablet, amlodipine 5 mg tablet  TAKE 1 TABLET BY MOUTH ONCE DAILY, Disp: , Rfl:     aspirin (ECOTRIN) 81 MG EC tablet, Take 81 mg by mouth once daily., Disp: , Rfl:     atorvastatin (LIPITOR) 40 MG tablet, Take 40 mg by mouth every evening. , Disp: , Rfl:     cholecalciferol, vitamin D3, (VITAMIN D3) 50 mcg (2,000 unit) Cap, Take 1 capsule by mouth once daily., Disp: , Rfl:     metoprolol  succinate (TOPROL-XL) 25 MG 24 hr tablet, Take 25 mg by mouth every evening. , Disp: , Rfl:     omeprazole (PRILOSEC) 40 MG capsule, omeprazole 40 mg capsule,delayed release  TAKE 1 CAPSULE BY MOUTH 30 MINUTES BEFORE BREAKFAST ONCE DAILY, Disp: , Rfl:     pantoprazole (PROTONIX) 40 MG tablet, Take 40 mg by mouth once daily., Disp: , Rfl:     potassium chloride (MICRO-K) 10 MEQ CpSR, Take 10 mEq by mouth every morning. , Disp: , Rfl:     sucralfate (CARAFATE) 1 gram tablet, sucralfate 1 gram tablet  TAKE 1 TABLET BY MOUTH ON AN EMPTY STOMACH TWICE DAILY FOR 90 DAYS, Disp: , Rfl:     PMHx/PSHx Updates:  See patient's last visit with me on 10/11/2022  See H&P on 10/25/2018        Pathology:  Cancer Staging      Gastric Wedge Resection: 11/27/2018:    FINAL DIAGNOSIS:  POSTERIOR STOMACH, WEDGE RESECTION:   GASTROINTESTINAL STROMAL TUMOR (GIST), MIXED SPINDLE CELL AND  EPITHELIOID TYPE.    2.5 CM IN GREATEST DIMENSIONS.    THE SHAVED SURGICAL MARGIN OF RESECTION IS FREE OF TUMOR.    THE TUMOR FOCALLY EXTENDS TO THE SEROSAL SURFACE.  pT2NX, (Stage IA).  G1: Low Grade with mitotic rate less than or equal to 5/5mm2  KIT ():     Positive.   DOG-1:     Positive    RISK ASSESSMENT:   Very low risk (1.9%), Based on the strict criteria of mitotic count  and size of tumor. A moderate risk cannot be entirely   excluded.    Objective:     Vitals:  There were no vitals taken for this visit.    Physical Examination:   GEN: no apparent distress, comfortable; AAOx3  HEAD: atraumatic and normocephalic  EYES: no pallor, no icterus, PERRLA  ENT: OMM, no pharyngeal erythema, external ears WNL; no nasal discharge; no thrush  NECK: no masses, thyroid normal, trachea midline, no LAD/LN's, supple  CV: RRR with no murmur; normal pulse; normal S1 and S2; no pedal edema  CHEST: Normal respiratory effort; CTAB; normal breath sounds; no wheeze or crackles  ABDOM: nontender and nondistended; soft; normal bowel sounds; no  rebound/guarding  MUSC/Skeletal: ROM normal; no crepitus; joints normal; no deformities or arthropathy  EXTREM: no clubbing, cyanosis, inflammation or swelling  SKIN: no rashes, lesions, ulcers, petechiae or subcutaneous nodules  : no amaro  NEURO: grossly intact; motor/sensory WNL; AAOx3; no tremors  PSYCH: normal mood, affect and behavior  LYMPH: normal cervical, supraclavicular, axillary and groin LN's            Labs:      Lab Results   Component Value Date    WBC 5.75 09/12/2023    HGB 12.7 09/12/2023    HCT 39.7 09/12/2023    MCV 98 09/12/2023     09/12/2023     CMP  Sodium   Date Value Ref Range Status   09/12/2023 140 136 - 145 mmol/L Final   01/17/2019 138 134 - 144 mmol/L      Potassium   Date Value Ref Range Status   09/12/2023 4.5 3.5 - 5.1 mmol/L Final     Chloride   Date Value Ref Range Status   09/12/2023 108 95 - 110 mmol/L Final   01/17/2019 98 98 - 110 mmol/L      CO2   Date Value Ref Range Status   09/12/2023 25 23 - 29 mmol/L Final     Glucose   Date Value Ref Range Status   09/12/2023 122 (H) 70 - 110 mg/dL Final   01/17/2019 114 (H) 70 - 99 mg/dL      BUN   Date Value Ref Range Status   09/12/2023 26 (H) 8 - 23 mg/dL Final     Creatinine   Date Value Ref Range Status   09/12/2023 1.4 0.5 - 1.4 mg/dL Final   01/17/2019 1.08 0.60 - 1.40 mg/dL      Calcium   Date Value Ref Range Status   09/12/2023 9.3 8.7 - 10.5 mg/dL Final     Total Protein   Date Value Ref Range Status   07/27/2022 7.2 6.0 - 8.4 g/dL Final     Albumin   Date Value Ref Range Status   09/12/2023 3.8 3.5 - 5.2 g/dL Final   01/17/2019 4.2 3.1 - 4.7 g/dL      Total Bilirubin   Date Value Ref Range Status   07/27/2022 0.6 0.1 - 1.0 mg/dL Final     Comment:     For infants and newborns, interpretation of results should be based  on gestational age, weight and in agreement with clinical  observations.    Premature Infant recommended reference ranges:  Up to 24 hours.............<8.0 mg/dL  Up to 48 hours............<12.0  mg/dL  3-5 days..................<15.0 mg/dL  6-29 days.................<15.0 mg/dL       Alkaline Phosphatase   Date Value Ref Range Status   07/27/2022 98 55 - 135 U/L Final     AST   Date Value Ref Range Status   07/27/2022 21 10 - 40 U/L Final     ALT   Date Value Ref Range Status   07/27/2022 18 10 - 44 U/L Final     Anion Gap   Date Value Ref Range Status   09/12/2023 7 (L) 8 - 16 mmol/L Final     eGFR if    Date Value Ref Range Status   07/27/2022 59.2 (A) >60 mL/min/1.73 m^2 Final     eGFR if non    Date Value Ref Range Status   07/27/2022 51.3 (A) >60 mL/min/1.73 m^2 Final     Comment:     Calculation used to obtain the estimated glomerular filtration  rate (eGFR) is the CKD-EPI equation.                Radiology/Diagnostic Studies:      CT 4/13/2023:    CT ABDOMEN:  Oral contrast remains in patulous distal esophagus, unchanged. Visualized lung bases are clear.     Noncontrast liver, pancreas, spleen, and adrenals are normal. Kidneys show no new abnormality left renal cortical hypodensities suggesting cysts unchanged.     Minor aortoiliac calcifications are present.     Surgical staples affect the gastric fundus and proximal greater curvature, with stomach otherwise unremarkable. Diverticula arise from the colon. Enteric contrast courses through small intestines without obstruction. A normal appendix is present. No enlarged lymph nodes throughout the abdomen.     No suspicious osseous abnormality.     CT PELVIS:  Noncontrast uterus and ovaries are normal. No free pelvic fluid. Bladder is normal. No enlarged pelvic lymph nodes. No osseous abnormality.     IMPRESSION:  No evidence of recurrent malignancy or metastatic disease.        CT 9/6/2022:    IMPRESSION:     1. Negative for recurrent malignancy or metastatic disease, with unchanged postoperative changes of proximal stomach.  2. Moderate right hydronephrosis, of undetermined etiology and unchanged. Although renal sinus  cysts could give a similar appearance, and the current appearance appears more indicative of hydronephrosis of undetermined etiology. Further imaging evaluation is recommended with CT urogram without and with IV contrast to assess for potential etiology and definitively differentiate renal sinus cysts from hydronephrosis.        CT abdom/pelvis  9/7/2021:      IMPRESSION:  1. Postoperative changes of prior GIST tumor resection of the cardia of the stomach, with no lymphadenopathy or CT finding of residual/recurrent disease.  2. No acute abdominal or pelvic abnormality.          US  abdom  7/28/2021:  IMPRESSION:  Dilated common duct reaching diameter of 8 mm. Further evaluation with CT abdomen and pelvis with IV contrast and MRCP are recommended.         CT abdom/pelvis  9/2/2020:    IMPRESSION:     1.  Status post resection of gastric GIST tumor. No recurrent tumor  or regional soft tissue abnormality identified.  2.  No other acute changes when compared previous exam.         PET  7/29/2019:  Impression       No FDG PET/CT findings to suggest recurrent or metastatic disease.             Chest CT 4/24/2019    IMPRESSION:  1. No acute abnormality seen.  2. The 2 lung nodules seen on CT PET scan dated 01/29/2019 represent calcified  granulomas.  3. There are several small perifissural lymph nodes in the right lung measuring  5 mm or less in diameter.  4. There  is no evidence of lung metastasis.  5. No change in the sclerotic focus in the right scapula described on the  previous PET scan and increases the probability of it representing a benign  lesion.        PET 1/15/2019:  IMPRESSION:    1. No current convincing evidence for residual malignancy or metastatic disease.    2. Incidental findings tiny noncalcified left lung nodules and sclerotic focus  in right scapula. Benign etiologies are favored, although metastatic disease is  conceivable but felt less likely. CT thorax without IV contrast follow-up  "is  recommended in 3 months to begin to document prolonged stability if imaging  follow-up suggested for at least 2 years. If old outside CTs of the chest are  available to document prolonged stability and these are made available,  addendum can be issued at that time.    3. Focus of FDG uptake along deep aspect of umbilicus without CT correlate is  likely inflammatory in nature and related to recent surgery.        I have reviewed all available lab results and radiology reports.    Assessment/Plan:   (1) 72 y.o. female with diagnosis of a submucosal nodule of the gastric cardia who has been referred by Dr Yunior Wong with GI for evaluation by medical hematology/oncology.   - She had originally presented with abdominal pain and underwent EGD with Dr Wong on 10/19/2018.   - She had a CT scan on 10/11/2018 which showed a 2.7 x 1.6cm soft tissue mass in the cardia of the stomach.   - on EGD, he found a submucosal nodule in the cardia of the stomach.   - Pathology from the initial biopsy seems to have been nondiagnostic.   - she saw Dr Garcia with Gen Surg and underwent a wedge resection on 11/27/2018 with the pathology ultimately showing GIST  - G1: Low Grade; pT2NX, (Stage IA).  - upon review of the latest guidelines, she should not need any adjuvant therapy with the drug Gleevec due to the low-grade nature of her GIST  - "According to pathologist, her tumor was 2.5cm but was low grade with mitotic rate < or = to 5/50 hpf  - her risk assessment as a result is very low risk at 1.9% risk on metastasis"   - as a result, based on the latest NCCN guidelines (v. 1.2019) I would recommend surveillance only and she should not need Gleevec therapy at this time    - she had PEt on 7/29/2019 7/21/2020:  - She last saw Dr Wong about 4 months ago and had repeat scope which was "good" per patient   - she is due for repeat labs and scnas    1/21/2021:  - some residual reflux symptoms but she has not seen Dr Wong in over " a year  - scans in Sept 2020 on chart and good  - repeat scans again in Sept 2021 7/29/2021:  - check up to date labs  - CT scheduled for Sept 7th 2021  - she needs to f/u with Dr Wong with GI for the reflux sx's  - recent US done with Dr almeida with some dilation of the bile duct    1/27/2022:  - she had CT scan in Sept 2021 which were stable with no evidence of recurrent cancer  - she has some residual reflux which is controlled with prilosec  - she has not follow-up with Dr Wong recently and I encouraged her to do so      7/27/2022:  - she is doing ok with no new issues  - I have recommended repeat CT scans in Sept 2022  - she needs up to date labs  - I recommend and encouraged her to f/u with GI for repeat endoscopies    10/11/2022:  - She had scope on 10/7/2022 with Dr Asif - looks like they just found a small ulcer 3mm at GE junction and some erythema in gastric  - She had CT scans on 9/6/2022 with no evidence of residual or recurrent cancer but she has some right sided hydronephrosis.   - She saw Dr Kennedy with   and she had a cystoscope on 9/30  -  is expecting to see her again in one month    4/11/2023:  - doing ok with no new issues  - will get repeat scans in Sept 2023 11/28/2023:  - she is doing ok with no new issues  - last scans were in April 2023  - she sees Dr Asif again in Jan 2024      (2) HTN and hypercholesterolemia     (3) Hernia     (4) Chronic gastritis      (5) Low potassium     (6) S/p prior bladder drainage issue          VISIT DIAGNOSES:      Gastrointestinal stromal tumor (GIST) of stomach    Mass of stomach - cardia    Abnormal computed tomography of stomach          PLAN:  1. F/u with GI as directed - sees Dr Asif again in Jan 2024  2. No indication for Gleevec at this time  3. F/u with PCP, GI, Card, Gen Surg,  etc   4. Repeat scan in April 2024  5. RTC in  6 months    Fax note to Lidya Garcia (new PCP, Yefri; Azeb Almeida: Brent    Discussion:  "    Pathology Discussion:    I reviewed and discussed the pathology report(s) and radiograph reports (if available) in as simple to understand and/or laymen's terms to the best of my ability. I had an indepth conversation with the patient and went over the patient's individual diagnosis based on the information that was currently available. I discussed the TNM staging process with regard to the patient's particular cancer type, and the calculated stage based on the currently available TNM data and literature. I discussed the available prognostic data with regard to the current staging information and how it relates to the prognosis of their particular neoplastic process.          NCCN Guidelines:    I discussed the available treatment option(s) in accordance with the latest literature from the NCCN Clinical Practice Guidelines for the patient's particular type of cancer disorder. The NCCN Guidelines provide a "document evidence-based (and) consensus-driven management" of the care of oncology patients. The treatment recommendations were made not only in accordance to the NCCN guidelines, but also factored in to account the patient's overall age, condition, performance status and their medical co-morbidities. I went over the risks and benefits of the the treatment options (if any could be made) with regard to their particular cancer type, their cancer stage, their age, and their co-morbidities.            COVID-19 Discussion:    I had long discussion with patient and any applicable family about the COVID-19 coronavirus epidemic and the recommended precautions with regard to cancer and/or hematology patients. I have re-iterated the CDC recommendations for adequate hand washing, use of hand -like products, and coughing into elbow, etc. In addition, especially for our patients who are on chemotherapy and/or our otherwise immunocompromised patients, I have recommended avoidance of crowds, including movie " theaters, restaurants, churches, etc. I have recommended avoidance of any sick or symptomatic family members and/or friends. I have also recommended avoidance of any raw and unwashed food products, and general avoidance of food items that have not been prepared by themselves. The patient has been asked to call us immediately with any symptom developments, issues, questions or other general concerns.       I spent over 25 mins of time with the patient. Reviewed results of the recently ordered labs, tests and studies; made directives with regards to the results. Over half of this time was spent couseling and coordinating care.    I have explained all of the above in detail and the patient understands all of the current recommendation(s). I have answered all of their questions to the best of my ability and to their complete satisfaction.   The patient is to continue with the current management plan.    Electronically signed by William Joe MD                       never

## 2023-12-05 NOTE — ED PROVIDER NOTE - OBJECTIVE STATEMENT
68-year-old male with PMH of HFrEF 15 to 20% s/p AICD, MR/TR, IDDM with neuropathy, hypoglycemia, HTN, HLD, CAD s/p MI s/p CABG s/p stent, history of in-stent thrombosis while on Plavix, PAD s/p 3L LE stents, TIAGO on CPAP at night, psoriasis, right AKA, frequent UTI's, presents to ED for evaluation s/p fall 2 days ago.  History is inconsistent.  Wife at bedside states patient fell yesterday, unwitnessed and has been complaining of neck pain Since his fall.  Febrile today temp orally to 101.  Decreased appetite and p.o. intake over this time as well as generalized weakness.  Denies cough/congestion, CP, SOB, vomiting, diarrhea.

## 2023-12-05 NOTE — ED PROVIDER NOTE - PHYSICAL EXAMINATION
VITAL SIGNS: I have reviewed nursing notes and confirm.  CONSTITUTIONAL: Chronically ill-appearing cachectic male in no acute distress  HEAD: Normocephalic; atraumatic.  EYES: conjunctiva and sclera clear.  ENT: dry mm  CARD: S1, S2 normal; no murmurs, gallops, or rubs. Regular rate and rhythm. 2+ distal pulses  RESP: Normal respiratory effort, no tachypnea or distress. Lungs with Decreased breath sounds left anterior chest wall, bedside ultrasound shows no pneumothorax  chest wall non-tender  back with (+)midline c spine ttp. no midline t/l/s spinal or paraspinal ttp. no step-offs or deformities. c-collar placed  pelvis stable  ABD: soft, NT/ND.  SKIN: Patient with multiple areas of ulceration to left lower extremity in various stages of healing, some are necrotic.  Largest is 2 medial aspect of shin approximately 3 x 4 cm diameter with necrotic center.  EXT: Full range of motion of bilateral upper extremities and left lower extremity, status post right AKA  Neuro: A&Ox3, normal speech, CN II-XII intact, FROM & strength 5/5 x4 extremities. Sensation intact and equal. Normal gait, (-)romberg, no pronator drift, no dysmetria on finger to nose b/l.  PSYCH: Cooperative, appropriate.

## 2023-12-05 NOTE — H&P ADULT - ATTENDING COMMENTS
HPI:  68-year-old male with PMH of HFrEF 15 to 20% s/p AICD, MR/TR, IDDM with neuropathy, hypoglycemia, HTN, HLD, CAD s/p MI s/p CABG s/p stent, history of in-stent thrombosis while on Plavix, PAD s/p 3L LE stents, TIAGO on CPAP at night, psoriasis, right AKA, frequent UTI's, presents to ED for evaluation s/p fall 2 days ago.  History is inconsistent.  Wife at bedside states patient fell yesterday, unwitnessed and has been complaining of neck pain Since his fall.  Febrile today temp orally to 101.  Decreased appetite and p.o. intake over this time as well as generalized weakness.  Denies cough/congestion, CP, SOB, vomiting, diarrhea    In the ED: BP: 82/56, HR: 68, Temp: 102.2 , 96% on 2L, RR: 18    Labs:  -> WBC: 27.06, H.9 (basline 9) MCV: 88, plt: 239  -> Lactate: 2.8 -> 1.6   -> Na: 130, K: 3.8, Cl: 94, BUN: 79, crea: 2.5 (baseline 1.1)  -> Glucose 261, ALP: 118, AST/ALT: 33/34  -> beta hydroxy: <0.2    -> UA: LE large, WBC: >998, Bacteria negative    CXR: pulmonary vascular congestion    CT Head and Spine negative for acute patholgy    CT chest, Abdo/pelv with IV con:  1.  No definite evidence of acute traumatic injury to the chest, abdomen or pelvis.  2.  T8 vertebral body complete sclerosis, new from prior exam.   Nonspecific though suspicious for metastatic disease if there is any known history of malignancy. Nuclear medicine bone scan can be considered for further evaluation as clinically appropriate.  3.  Trace bilateral pleural effusions, decreased from prior.      s/p 1L bolus, and cefepime and Meropenem once  Patient then started on Levophed to improve BP and admitted to SDU (05 Dec 2023 21:16)    REVIEW OF SYSTEMS: see cc/HPI   CONSTITUTIONAL: (+) weakness, (+) fall, (+) fevers/ chills  EYES/ENT: No visual changes;  No vertigo or throat pain   NECK: No pain or stiffness  RESPIRATORY: No cough, wheezing, hemoptysis; No shortness of breath  CARDIOVASCULAR: No chest pain or palpitations  GASTROINTESTINAL: No abdominal or epigastric pain. No nausea, vomiting, or hematemesis; No diarrhea or constipation. No melena or hematochezia.  GENITOURINARY: No dysuria, frequency or hematuria  NEUROLOGICAL: No numbness or weakness  SKIN: No itching, rashes    Physical Exam:   General: WN/WD NAD  Neurology: A&Ox3, nonfocal, follows commands  Eyes: PERRLA/ EOMI  ENT/Neck: Neck supple, trachea midline, No JVD  Respiratory: CTA B/L, No wheezing, rales, rhonchi  CV: Normal rate regular rhythm, S1S2, no murmurs, rubs or gallops  Abdominal: Soft, NT, ND +BS,   Extremities: No edema, + peripheral pulses  Skin: No Rashes, Hematoma, Ecchymosis    A/p HPI:  68-year-old male with PMH of HFrEF 15 to 20% s/p AICD, MR/TR, IDDM with neuropathy, hypoglycemia, HTN, HLD, CAD s/p MI s/p CABG s/p stent, history of in-stent thrombosis while on Plavix, PAD s/p 3L LE stents, TIAGO on CPAP at night, psoriasis, right AKA, frequent UTI's, presents to ED for evaluation s/p fall 2 days ago.  History is inconsistent.  Wife at bedside states patient fell yesterday, unwitnessed and has been complaining of neck pain Since his fall.  Febrile today temp orally to 101.  Decreased appetite and p.o. intake over this time as well as generalized weakness.  Denies cough/congestion, CP, SOB, vomiting, diarrhea    In the ED: BP: 82/56, HR: 68, Temp: 102.2 , 96% on 2L, RR: 18  Labs:  -> WBC: 27.06, H.9 (basline 9) MCV: 88, plt: 239  -> Lactate: 2.8 -> 1.6   -> Na: 130, K: 3.8, Cl: 94, BUN: 79, crea: 2.5 (baseline 1.1)  -> Glucose 261, ALP: 118, AST/ALT: 33/34  -> beta hydroxy: <0.2  -> UA: LE large, WBC: >998, Bacteria negative  CXR: pulmonary vascular congestion  CT Head and C-Spine negative for acute pathology  CT chest, Abdo/pelv with IV con:  1.  No definite evidence of acute traumatic injury to the chest, abdomen or pelvis.  2.  T8 vertebral body complete sclerosis, new from prior exam.   Nonspecific though suspicious for metastatic disease if there is any known history of malignancy. Nuclear medicine bone scan can be considered for further evaluation as clinically appropriate.  3.  Trace bilateral pleural effusions, decreased from prior.    s/p 1L bolus, and cefepime and Meropenem once  Patient then started on Levophed to improve BP and admitted to SDU (05 Dec 2023 21:16)    REVIEW OF SYSTEMS: see cc/HPI   CONSTITUTIONAL: (+) weakness, (+) fall, (+) fevers/ chills  EYES/ENT: No visual changes;  No vertigo or throat pain   NECK: No pain or stiffness  RESPIRATORY: No cough, wheezing, hemoptysis; No shortness of breath  CARDIOVASCULAR: No chest pain or palpitations  GASTROINTESTINAL: No abdominal or epigastric pain. No nausea, vomiting, or hematemesis; No diarrhea or constipation. No melena or hematochezia.  GENITOURINARY: No dysuria, frequency or hematuria  NEUROLOGICAL: No numbness or weakness  SKIN: No itching, rashes    Physical Exam:   General: Ill appearing on pressors  Neurology: A&Ox1, nonfocal, follows commands  Eyes: PERRLA/ EOMI  ENT/Neck: Neck supple, trachea midline, No JVD  Respiratory: CTA B/L, No wheezing, rales, rhonchi  CV: Normal rate regular rhythm, S1S2, no murmurs, rubs or gallops  Abdominal: Soft, NT, ND +BS,   Extremities: No edema, + peripheral pulses  Skin: No Rashes, Hematoma, Ecchymosis    A/p  Septic shock responsive to Levo  H/o recurrent UTI w/ ESBL klebsiella   -admit to SDU  -IV pressors - goal MAP > 65   -IV Abx  -repeat CBC   -check blood and UCx   -Is and Os   -ID eval   -Critical care follow up     chronic HFrEF / s/p AICD for EF of 20%   VHD   H/o CAD / CABG  -hold antihypertensives but consider continuing IV diuretic if BP allow to avoid fluid overload    Fall   T8 vertebral body sclerosis   -OP Bone scan vs PET eval r/o malignancy      MOISES on CKD III, in setting of sepsis and hypotension   -IV pressor   -serial Scr monitoring   -PT/ Rehab eval once improved     H/o PAD s/p R AKA   Dyslipidemia   DM type II w/ Neuropathy  -c/w antiplatelet Rx  -Lantus / Lispro ISS w/ F/S monitoring     H/o RA   -c/w OP Rx    Anemia - multifactorial, chronic disease and BAIRON  -OP work up including GI eval    TIAGO   -CPAP   -pulse ox monitoring     DVT and GI prophylaxis     NPO for now and swallow eval when more alert HPI:  68-year-old male with PMH of HFrEF 15 to 20% s/p AICD, MR/TR, IDDM with neuropathy, hypoglycemia, HTN, HLD, CAD s/p MI s/p CABG s/p stent, history of in-stent thrombosis while on Plavix, PAD s/p 3L LE stents, TIAGO on CPAP at night, psoriasis, right AKA, frequent UTI's, presents to ED for evaluation s/p fall 2 days ago.  History is inconsistent.  Wife at bedside states patient fell yesterday, unwitnessed and has been complaining of neck pain Since his fall.  Febrile today temp orally to 101.  Decreased appetite and p.o. intake over this time as well as generalized weakness.  Denies cough/congestion, CP, SOB, vomiting, diarrhea    In the ED: BP: 82/56, HR: 68, Temp: 102.2 , 96% on 2L, RR: 18  Labs:  -> WBC: 27.06, H.9 (basline 9) MCV: 88, plt: 239  -> Lactate: 2.8 -> 1.6   -> Na: 130, K: 3.8, Cl: 94, BUN: 79, crea: 2.5 (baseline 1.1)  -> Glucose 261, ALP: 118, AST/ALT: 33/34  -> beta hydroxy: <0.2  -> UA: LE large, WBC: >998, Bacteria negative  CXR: pulmonary vascular congestion  CT Head and C-Spine negative for acute pathology  CT chest, Abdo/pelv with IV con:  1.  No definite evidence of acute traumatic injury to the chest, abdomen or pelvis.  2.  T8 vertebral body complete sclerosis, new from prior exam.   Nonspecific though suspicious for metastatic disease if there is any known history of malignancy. Nuclear medicine bone scan can be considered for further evaluation as clinically appropriate.  3.  Trace bilateral pleural effusions, decreased from prior.    s/p 1L bolus, and cefepime and Meropenem once  Patient then started on Levophed to improve BP and admitted to SDU (05 Dec 2023 21:16)    REVIEW OF SYSTEMS: see cc/HPI   CONSTITUTIONAL: (+) weakness, (+) fall, (+) fevers/ chills  EYES/ENT: No visual changes;  No vertigo or throat pain   NECK: No pain or stiffness  RESPIRATORY: No cough, wheezing, hemoptysis; No shortness of breath  CARDIOVASCULAR: No chest pain or palpitations  GASTROINTESTINAL: No abdominal or epigastric pain. No nausea, vomiting, or hematemesis; No diarrhea or constipation. No melena or hematochezia.  GENITOURINARY: No dysuria, frequency or hematuria  NEUROLOGICAL: No numbness or weakness  SKIN: No itching, rashes    Physical Exam:   General: Ill appearing on pressors  Neurology: A&Ox1, nonfocal, follows commands  Eyes: PERRLA/ EOMI  ENT/Neck: Neck supple, trachea midline, No JVD  Respiratory: CTA B/L, No wheezing, rales, rhonchi  CV: Normal rate regular rhythm, S1S2, no murmurs, rubs or gallops  Abdominal: Soft, NT, ND +BS,   Extremities: No edema, + peripheral pulses  Skin: No Rashes, Hematoma, Ecchymosis    A/p  Septic shock responsive to Levo  H/o recurrent UTI w/ ESBL klebsiella   -admit to SDU  -IV pressors - goal MAP > 65   -IV Abx  -repeat CBC   -check blood and UCx   -Is and Os   -ID eval   -Critical care follow up     chronic HFrEF / s/p AICD for EF of 20%   VHD   H/o CAD / CABG  -hold antihypertensives but consider continuing IV diuretic if BP allow to avoid fluid overload    Fall   T8 vertebral body sclerosis   -OP Bone scan vs PET eval r/o malignancy      OMISES on CKD III, in setting of sepsis and hypotension   -IV pressor   -serial Scr monitoring   -PT/ Rehab eval once improved     H/o PAD s/p R AKA   Dyslipidemia   DM type II w/ Neuropathy  -c/w antiplatelet Rx  -Lantus / Lispro ISS w/ F/S monitoring     H/o RA   -c/w OP Rx    Anemia - multifactorial, chronic disease and BAIRON  -OP work up including GI eval    TIAGO   -CPAP   -pulse ox monitoring     DVT and GI prophylaxis     NPO for now and swallow eval when more alert

## 2023-12-05 NOTE — H&P ADULT - HISTORY OF PRESENT ILLNESS
68-year-old male with PMH of HFrEF 15 to 20% s/p AICD, MR/TR, IDDM with neuropathy, hypoglycemia, HTN, HLD, CAD s/p MI s/p CABG s/p stent, history of in-stent thrombosis while on Plavix, PAD s/p 3L LE stents, TIAGO on CPAP at night, psoriasis, right AKA, frequent UTI's, presents to ED for evaluation s/p fall 2 days ago.  History is inconsistent.  Wife at bedside states patient fell yesterday, unwitnessed and has been complaining of neck pain Since his fall.  Febrile today temp orally to 101.  Decreased appetite and p.o. intake over this time as well as generalized weakness.  Denies cough/congestion, CP, SOB, vomiting, diarrhea    In the ED: BP: 82/56, HR: 68, Temp: 102.2 , 96% on 2L, RR: 18    Labs:  -> WBC: 27.06, H.9 (basline 9) MCV: 88, plt: 239  -> Lactate: 2.8 -> 1.6   -> Na: 130, K: 3.8, Cl: 94, BUN: 79, crea: 2.5 (baseline 1.1)  -> Glucose 261, ALP: 118, AST/ALT: 33/34  -> beta hydroxy: <0.2    -> UA: LE large, WBC: >998, Bacteria negative    CXR: pulmonary vascular congestion    CT Head and Spine negative for acute patholgy    CT chest, Abdo/pelv with IV con:  1.  No definite evidence of acute traumatic injury to the chest, abdomen or pelvis.  2.  T8 vertebral body complete sclerosis, new from prior exam.   Nonspecific though suspicious for metastatic disease if there is any known history of malignancy. Nuclear medicine bone scan can be considered for further evaluation as clinically appropriate.  3.  Trace bilateral pleural effusions, decreased from prior.      s/p 1L bolus, and cefepime and Meropenem once  Patient then started on LEvophed to improve BP and admitted to SDU 68-year-old male with PMH of HFrEF 15 to 20% s/p AICD, MR/TR, Type 2 DM with neuropathy, hypoglycemia, HTN, HLD, CAD s/p MI s/p CABG s/p stent, history of in-stent thrombosis while on Plavix, PAD s/p 3L LE stents, TIAGO on CPAP at night, psoriasis, right AKA, frequent UTI's, presents to ED from rehab for evaluation s/p fall 2 days ago.  History is inconsistent.  Wife at bedside states patient fell yesterday, unwitnessed and has been complaining of neck pain Since his fall.  Febrile today temp orally to 101.  Decreased appetite and p.o. intake over this time as well as generalized weakness.  Denies cough/congestion, CP, SOB, vomiting, diarrhea    In the ED: BP: 82/56, HR: 68, Temp: 102.2 , 96% on 2L, RR: 18    Labs:  -> WBC: 27.06, H.9 (basline 10) MCV: 88, plt: 239  -> Lactate: 2.8 -> 1.6   -> Na: 130, K: 3.8, Cl: 94, BUN: 79, crea: 2.5 (baseline 1.1)  -> Glucose 261, ALP: 118, AST/ALT: 33/34  -> beta hydroxy: <0.2    -> UA: LE large, WBC: >998, Bacteria negative    CXR: pulmonary vascular congestion    CT Head and Spine negative for acute patholgy    CT chest, Abdo/pelv with IV con:  1.  No definite evidence of acute traumatic injury to the chest, abdomen or pelvis.  2.  T8 vertebral body complete sclerosis, new from prior exam.   Nonspecific though suspicious for metastatic disease if there is any known history of malignancy. Nuclear medicine bone scan can be considered for further evaluation as clinically appropriate.  3.  Trace bilateral pleural effusions, decreased from prior.      s/p 1L bolus, and cefepime and Meropenem once  Patient then started on Levophed 0.05 to improve BP and admitted to SDU
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
## 2023-12-05 NOTE — H&P ADULT - ASSESSMENT
68-year-old male with PMH of HFrEF 15 to 20% s/p AICD, MR/TR, IDDM with neuropathy, hypoglycemia, HTN, HLD, CAD s/p MI s/p CABG s/p stent, history of in-stent thrombosis while on Plavix, PAD s/p 3L LE stents, TIAGO on CPAP at night, psoriasis, right AKA, frequent UTI's, presents to ED for evaluation s/p fall 2 days ago.  History is inconsistent.  Wife at bedside states patient fell yesterday, unwitnessed and has been complaining of neck pain Since his fall. Decreased appetite and p.o. intake over this time as well as generalized weakness. In the ED he was found to be hypotensive, tachy, febrile, got Abx, fluids then started on Levophed     #Septic shock   #Hx UTI 2/2 multi strains ESBL Klebsiella   - On admission: WBC 27K, Lactate 2.8, BP: 82/56, HR: 68, Temp: 102.2  - s/p Cefepime and Meropenem once in ED  - c/w Meropenem   ...........    # MOISES on CKD3 - pre-renal in setting of hypotension and decreased PO intake   - No hydro on CT  - s/p 1L bolus    #Acute on chronic normocytic anemia   #Hx of Anemia of chronic disease + iron def anemia  - iron sat 3%; ferr 56 s/p venofer x5days  - oral iron as outpt  - will need outpatient GI eval once stable as outpt for awhile    #Chronic HFrEF 20% (Last echo 5/2023)  # mod MR; mod-sev TR  - Note: pt allergic to ACEI and Entresto  - Daily weights, Strict Is and Os  BP low: decr Torsemide 10mg q24 and cont aldactone 12.5mg po q24 - hold either/both for BP < 90/55????????  - hold losartan for hypotension    # CAD; MI; s/p CABG; s/p stent  # PAD s/p stent and Right AKA  c/w DAPT: asa + prasugrel (pt allergic to plavix)    # DM T2 w/ neuropathy  - goal 100-180  - c/w Home Lantus 8 units HS + SS and adjust as needed     # Hypothyroid; anti TPO Ab+:  - c/w home Synthroid 25 mcg q24 and 50 on Sunday  - TSH 5.6 in 1/2023- outpt f/u    # DLD  - Home crestor 40 HS -  > pt allergic to Lipitor -> statins on hold here: can resume crestor as outpt  - Lipid panel wnl 6/2023    # Skin excoriations; periph neuropathy 2/2 DM and vasc  - MRSA nares +: s/p bactroban oint to nares top q12 for 5 days (6/7/23-6/12/23)  - c/w home Neurontin 100mg po q12  - c/w home Duloxetine 30mg 3x/day  - c/w wound care per wound care team  -> Cleanse wound to right buttock with soap and water.   Pat dry apply triad, gauze and Allevyn twice a day and prn for soiling.     -> Cleanse wound to left forearm with soap and water  Pat dry, continue to apply bacitracin as ordered, Xeroform, wrap with Kerlix twice a day    -> Cleanse chronic wounds to extremities with soap and water daily  Pat dry leave open to air      # Rheum Arth:  - home MTX 10mg po Q wed -> HOLDING  - c/w home folate 1mg po q24;  ???????Holding Pred 5mg po q24    # TIAGO:  - CPAP not in use 2/2 claustrophobia    # BPH:  - c/w home Flomax 0.4 HS    # Anxiety:  - Home Valium 1-2mg at home PRN - hold off for now      #Misc:  - DVT ppx:  - GI PPX:  - Diet:  - Activity/PT eval:  - Labs:   - Code status:  - Dispo:    68-year-old male with PMH of HFrEF 15 to 20% s/p AICD, MR/TR, Type 2 DM with neuropathy, hypoglycemia, HTN, HLD, CAD s/p MI s/p CABG s/p stent, history of in-stent thrombosis while on Plavix, PAD s/p 3L LE stents, TIAGO on CPAP at night, psoriasis, right AKA, frequent UTI's, presents to ED for evaluation s/p fall 2 days ago.  History is inconsistent.  Wife at bedside states patient fell yesterday, unwitnessed and has been complaining of neck pain Since his fall. Decreased appetite and p.o. intake over this time as well as generalized weakness. In the ED he was found to be hypotensive, tachy, febrile, got Abx, fluids then started on Levophed       #Septic shock   #Hx UTI 2/2 multi strains ESBL Klebsiella   - On admission: WBC 27K, Lactate 2.8, BP: 82/56, HR: 68, Temp: 102.2  - UA: LE large, WBC: >998, Bacteria negative  - CXR: pulmonary vascular congestion  - s/p 1L Bolus  - s/p Cefepime and Meropenem once in ED  - c/w Meropenem 1 Q12  - f/u BCx and UCx  - f/u ID cs  - On Levo 0.05, wean off as tolerated     #Fall:  - CT H and Spine no fx  - T8 vertebral body complete sclerosis, new from prior exam. Nonspecific though suspicious for metastatic disease if there is any known history of malignancy. Nuclear medicine bone scan can be considered for further evaluation as clinically appropriate.  - PT consult and orthostatics when more stable    # MOISES on CKD3 - pre-renal in setting of hypotension and decreased PO intake   - No hydro on CT  - s/p 1L bolus  - Monitor crea    #Acute on chronic normocytic anemia   #Hx of Anemia of chronic disease + iron def anemia  - iron sat 3%; ferr 56 s/p Venofer x5days  - keep active type and screen  - Monitor Hg  - Consider GI cs when more stable     #Chronic HFrEF 20% (Last echo 5/2023)  # mod MR; mod-sev TR  - Note: pt allergic to ACEI and Entresto  - Daily weights, Strict Is and Os  - c/w Torsemide 20mg QD  - c/w home aldactone 12.5mg po QD  - HOLDING home Losartan 25 BID  - HOLDING home metoprolol 25 succ BID    # CAD; MI; s/p CABG; s/p stent  # PAD s/p stent and Right AKA  - c/w home ASA 81 QD  - c/w home Prasugrel 75 QD (pt allergic to Plavix)    # DM T2 w/ neuropathy  - goal 100-180  - c/w Home Lantus 8 units HS + SS and adjust as needed     # Hypothyroid; anti TPO Ab+:  - c/w home Synthroid 25 mcg QD  - TSH 5.6 in 1/2023    # DLD  - Home Rosuvastatin 40 HS - > pt allergic to Lipitor -> statins on hold here: can resume Rosuvastatin as outpt  - Lipid panel wnl 6/2023    # Skin excoriations; periph neuropathy 2/2 DM and vasc  - MRSA nares +: s/p bactroban oint to nares top q12 for 5 days (6/7/23-6/12/23)  - c/w home Duloxetine 30mg TID    # Rheum Arth:  - home MTX 10mg po Q wed -> HOLDING  - c/w home folate 1mg po q24    # TIAGO:  - CPAP not in use 2/2 claustrophobia    # BPH  - c/w home bethanecol 25 QD    # Anxiety:  - c/w Home Valium 1 mg at home PRN     #Misc:  - DVT ppx: Heparin SQ Q12  - GI PPX: Pantoprazole 40 QD  - Diet: Easy to chew DASH/CC 1.5 L restriction  - Activity/PT eval: PT eval  - Code status: Full code  - Dispo: SDU   68-year-old male with PMH of HFrEF 15 to 20% s/p AICD, MR/TR, Type 2 DM with neuropathy, hypoglycemia, HTN, HLD, CAD s/p MI s/p CABG s/p stent, history of in-stent thrombosis while on Plavix, PAD s/p 3L LE stents, TIAGO on CPAP at night, psoriasis, right AKA, frequent UTI's, presents to ED for evaluation s/p fall 2 days ago.  History is inconsistent.  Wife at bedside states patient fell yesterday, unwitnessed and has been complaining of neck pain Since his fall. Decreased appetite and p.o. intake over this time as well as generalized weakness. In the ED he was found to be hypotensive, tachy, febrile, got Abx, fluids then started on Levophed       #Septic shock   #Hx UTI 2/2 multi strains ESBL Klebsiella   - On admission: WBC 27K, Lactate 2.8, BP: 82/56, HR: 68, Temp: 102.2  - UA: LE large, WBC: >998, Bacteria negative  - CXR: pulmonary vascular congestion  - s/p 1L Bolus  - s/p Cefepime and Meropenem once in ED  - c/w Meropenem 1 Q12  - f/u BCx and UCx  - f/u ID cs  - On Levo 0.05, wean off as tolerated     #Fall:  - CT H and Spine no fx  - T8 vertebral body complete sclerosis, new from prior exam. Nonspecific though suspicious for metastatic disease if there is any known history of malignancy. Nuclear medicine bone scan can be considered for further evaluation as clinically appropriate.  - PT consult and orthostatics when more stable    # MOISES on CKD3 - pre-renal in setting of hypotension and decreased PO intake   - No hydro on CT  - s/p 1L bolus  - Monitor crea    #Acute on chronic normocytic anemia   #Hx of Anemia of chronic disease + iron def anemia  - iron sat 3%; ferr 56 s/p Venofer x5days  - keep active type and screen  - Monitor Hg  - Consider GI cs when more stable     #Chronic HFrEF 20% (Last echo 5/2023)  # mod MR; mod-sev TR  - Note: pt allergic to ACEI and Entresto  - Daily weights, Strict Is and Os  - HOLDING homeTorsemide 20mg QD  - HOLDING home Aldactone 12.5mg po QD  - HOLDING home Losartan 25 BID  - HOLDING home metoprolol 25 succ BID    # CAD; MI; s/p CABG; s/p stent  # PAD s/p stent and Right AKA  - c/w home ASA 81 QD  - c/w home Prasugrel 75 QD (pt allergic to Plavix)    # DM T2 w/ neuropathy  - goal 100-180  - c/w Home Lantus 8 units HS + SS and adjust as needed     # Hypothyroid; anti TPO Ab+:  - c/w home Synthroid 25 mcg QD  - TSH 5.6 in 1/2023    # DLD  - Home Rosuvastatin 40 HS - > pt allergic to Lipitor -> statins on hold here: can resume Rosuvastatin as outpt  - Lipid panel wnl 6/2023    # Skin excoriations; periph neuropathy 2/2 DM and vasc  - MRSA nares +: s/p bactroban oint to nares top q12 for 5 days (6/7/23-6/12/23)  - c/w home Duloxetine 30mg TID    # Rheum Arth:  - home MTX 10mg po Q wed -> HOLDING  - c/w home folate 1mg po q24    # TIAGO:  - CPAP not in use 2/2 claustrophobia    # BPH  - c/w home bethanecol 25 QD    # Anxiety:  - c/w Home Valium 1 mg at home PRN     #Misc:  - DVT ppx: Heparin SQ Q12  - GI PPX: Pantoprazole 40 QD  - Diet: Easy to chew DASH/CC 1.5 L restriction  - Activity/PT eval: PT eval  - Code status: Full code  - Dispo: SDU

## 2023-12-05 NOTE — ED ADULT NURSE REASSESSMENT NOTE - NS ED NURSE REASSESS COMMENT FT1
Pt. received from previous RN. Pt. alert and responsive to tactile and verbal stimuli, oriented to person and place. Norepinephrine drip ongoing. Cardiac monitoring ongoing. Safety maintained.

## 2023-12-05 NOTE — ED PROVIDER NOTE - ATTENDING CONTRIBUTION TO CARE
68-year-old male past medical history significant for hypertension, dyslipidemia, diabetes, CAD, status post CABG, status post PCI, status post AICD, PAD, status post multiple L LE stents, status post right AKA, TIAGO noncompliant with CPAP, hypothyroidism,RA on MTX, brought to the ED from home with 2-3 days of weakness, anorexia and fever.  Wife at bedside reports patient fell at home 3 days ago.  Patient has been complaining of neck pain since the fall.  No paresthesias or motor weakness.  Unknown head trauma or LOC.  Patient with a history of frequent UTIs.  No abdominal pain or back pain.No chest pain or shortness of breath.  No cough.  No headache or vomiting. Vitals noted.  CONSTITUTIONAL: Chronically ill. + Distress due to neck pain. GCS-15  HEAD: Normocephalic; atraumatic.   EYES: PERRL; EOM intact. Conjunctiva normal B/L.   ENT: Normal pharynx with no tonsillar hypertrophy. MMM.  NECK: + Midline c spine tenderness. No stepoff.   CHEST: Normal chest excursion with respiration. Nontender, no crepitus.   CARDIOVASCULAR: Normal S1, S2; no murmurs, rubs, or gallops.   RESPIRATORY: Normal chest excursion with respiration; breath sounds clear and equal bilaterally; no wheezes, rhonchi, or rales.  GI/: Normal bowel sounds; non-distended; non-tender.  BACK: No evidence of trauma or deformity. Non-tender to palpation. No CVA tenderness.   EXT: S/P R BKA  SKIN: + healing ulcers to LLE.   NEURO: A & O x 4; CN 2-12 intact. Grossly unremarkable.

## 2023-12-05 NOTE — ED PROVIDER NOTE - CLINICAL SUMMARY MEDICAL DECISION MAKING FREE TEXT BOX
68-year-old male past medical history as documented brought to the ED from home with 3 days of weakness, anorexia and fever.  Wife also reported a fall at home 3 days ago.  Patient been complaining of neck pain.  Patient with sepsis.  Sepsis treatment initiated.  Patient with UTI.  IV antibiotics and IV fluids given.  MOISES noted on lab work.  EKG paced.  Chest x-ray with no infiltrates.  Critical care consulted.  Patient mated to the stepdown unit.

## 2023-12-05 NOTE — ED PROVIDER NOTE - CARE PLAN
1 Principal Discharge DX:	Septic shock  Secondary Diagnosis:	Acute UTI  Secondary Diagnosis:	MOISES (acute kidney injury)

## 2023-12-05 NOTE — H&P ADULT - NSHPPHYSICALEXAM_GEN_ALL_CORE
CONSTITUTIONAL: Chronically ill. + Distress due to neck pain. GCS-15  HEAD: Normocephalic; atraumatic.   EYES: PERRL; EOM intact. Conjunctiva normal B/L.   ENT: Normal pharynx with no tonsillar hypertrophy. MMM.  NECK: + Midline c spine tenderness. No stepoff.   CHEST: Normal chest excursion with respiration. Nontender, no crepitus.   CARDIOVASCULAR: Normal S1, S2; no murmurs, rubs, or gallops.   RESPIRATORY: Normal chest excursion with respiration; breath sounds clear and equal bilaterally; no wheezes, rhonchi, or rales.  GI/: Normal bowel sounds; non-distended; non-tender.  BACK: No evidence of trauma or deformity. Non-tender to palpation. No CVA tenderness.   EXT: S/P R BKA  SKIN: + healing ulcers to LLE.   NEURO: A & O x 4; CN 2-12 intact. Grossly unremarkable.

## 2023-12-05 NOTE — H&P ADULT - NSHPREVIEWOFSYSTEMS_GEN_ALL_CORE
REVIEW OF SYSTEMS:    CONSTITUTIONAL:  Ill appearing  EYES/ENT:  No visual changes;  No vertigo or throat pain   NECK: Severe neck and back pain   RESPIRATORY:  No cough, wheezing, hemoptysis; No shortness of breath  CARDIOVASCULAR:  No chest pain or palpitations  GASTROINTESTINAL:  No abdominal or epigastric pain. No nausea, vomiting, or hematemesis; No diarrhea or constipation. No melena or hematochezia.  GENITOURINARY:  No dysuria, frequency or hematuria  NEUROLOGICAL:  No numbness or weakness  SKIN:  No itching, rashes

## 2023-12-05 NOTE — CONSULT NOTE ADULT - SUBJECTIVE AND OBJECTIVE BOX
Patient is a 68y old  Male who presents with a chief complaint of     HPI:  68-year-old male with PMH of HFrEF 15 to 20% s/p AICD, MR/TR, IDDM with neuropathy, hypoglycemia, HTN, HLD, CAD s/p MI s/p CABG s/p stent, history of in-stent thrombosis while on Plavix, PAD s/p 3L LE stents, TIAGO on CPAP at night, psoriasis, right AKA, frequent UTI's, presents to ED for evaluation s/p fall 2 days ago.  History is inconsistent.  Wife at bedside states patient fell yesterday, unwitnessed and has been complaining of neck pain Since his fall.  Febrile today temp orally to 101.  Decreased appetite and p.o. intake over this time as well as generalized weakness.  Denies cough/congestion, CP, SOB, vomiting, diarrhea    In the ED: BP: 82/56, HR: 68, Temp: 102.2 , 96% on 2L, RR: 18    Labs:  -> WBC: 27.06, H.9 (basline 9) MCV: 88, plt: 239  -> Lactate: 2.8 -> 1.6   -> Na: 130, K: 3.8, Cl: 94, BUN: 79, crea: 2.5 (baseline 1.1)  -> Glucose 261, ALP: 118, AST/ALT: 33/34  -> beta hydroxy: <0.2    -> UA: LE large, WBC: >998, Bacteria negative    CXR: pulmonary vascular congestion    CT Head and Spine negative for acute patholgy    CT chest, Abdo/pelv with IV con:  1.  No definite evidence of acute traumatic injury to the chest, abdomen or pelvis.  2.  T8 vertebral body complete sclerosis, new from prior exam.   Nonspecific though suspicious for metastatic disease if there is any known history of malignancy. Nuclear medicine bone scan can be considered for further evaluation as clinically appropriate.  3.  Trace bilateral pleural effusions, decreased from prior.      s/p 1L bolus, and cefepime and Meropenem once  Patient then started on LEvophed to improve BP and admitted to SDU (05 Dec 2023 21:16)      PAST MEDICAL & SURGICAL HISTORY:  Status post percutaneous transluminal coronary angioplasty  2 stents      Atherosclerosis of coronary artery  CAD (coronary artery disease)      Osteoarthritis      HLD (hyperlipidemia)      Diabetes  on insulin pump      CHF (congestive heart failure)  EF ~ 25%      History of celiac disease      Diverticulitis      STEMI (ST elevation myocardial infarction)      Diabetic neuropathy  Hands and Feet      Anxiety and depression      Other postprocedural status  Fixation hardware in foot LEFT      Stented coronary artery  10/18 heart attack  INFERIOR WALL MI      S/P CABG x 1  2018      S/P TKR (total knee replacement), right  with infection Mrsa   per pt he was cleared from MRSA infection      Surgery, elective  right knee wound debridement      History of open reduction and internal fixation (ORIF) procedure  right hip      H/O shoulder surgery  right      AICD (automatic cardioverter/defibrillator) present  [facility]      Cholecystostomy care  drain inserted  & removed 4 months ago      History of tonsillectomy      History of hip replacement, total, right      Elective surgery  plastic surgery Left shin          PREVIOUS DIAGNOSTIC TESTING:      ECHO  FINDINGS:    STRESS  FINDINGS:    CATHETERIZATION  FINDINGS:    MEDICATIONS  (STANDING):  aspirin enteric coated 81 milliGRAM(s) Oral daily  bethanechol 25 milliGRAM(s) Oral daily  dextrose 5%. 1000 milliLiter(s) (100 mL/Hr) IV Continuous <Continuous>  dextrose 5%. 1000 milliLiter(s) (50 mL/Hr) IV Continuous <Continuous>  dextrose 50% Injectable 12.5 Gram(s) IV Push once  dextrose 50% Injectable 25 Gram(s) IV Push once  dextrose 50% Injectable 25 Gram(s) IV Push once  DULoxetine 30 milliGRAM(s) Oral <User Schedule>  folic acid 1 milliGRAM(s) Oral daily  glucagon  Injectable 1 milliGRAM(s) IntraMuscular once  heparin   Injectable 5000 Unit(s) SubCutaneous every 12 hours  insulin glargine Injectable (LANTUS) 8 Unit(s) SubCutaneous every morning  insulin lispro (ADMELOG) corrective regimen sliding scale   SubCutaneous three times a day before meals  levothyroxine 25 MICROGram(s) Oral daily  meropenem  IVPB 1000 milliGRAM(s) IV Intermittent every 12 hours  norepinephrine Infusion 0.05 MICROgram(s)/kG/Min (6.38 mL/Hr) IV Continuous <Continuous>  pantoprazole    Tablet 40 milliGRAM(s) Oral before breakfast  prasugrel 10 milliGRAM(s) Oral daily  spironolactone 25 milliGRAM(s) Oral daily  torsemide 20 milliGRAM(s) Oral daily    MEDICATIONS  (PRN):  dextrose Oral Gel 15 Gram(s) Oral once PRN Blood Glucose LESS THAN 70 milliGRAM(s)/deciliter  diazepam    Tablet 1 milliGRAM(s) Oral at bedtime PRN Anxiety/Insomnia  oxycodone    5 mG/acetaminophen 325 mG 1 Tablet(s) Oral every 6 hours PRN for severe pain      FAMILY HISTORY:  Family history of heart disease (Mother)    Family history of allergies  daughter        SOCIAL HISTORY:  CIGARETTES:    ALCOHOL:    REVIEW OF SYSTEMS:  CONSTITUTIONAL: No fever, weight loss, or fatigue  NECK: No pain or stiffness  RESPIRATORY: No cough, wheezing, chills or hemoptysis; No shortness of breath  CARDIOVASCULAR: No chest pain, palpitations, dizziness, or leg swelling  GASTROINTESTINAL: No abdominal or epigastric pain. No nausea, vomiting, or hematemesis; No diarrhea or constipation. No melena or hematochezia.  GENITOURINARY: No dysuria, frequency, hematuria, or incontinence  NEUROLOGICAL: No headaches, memory loss, loss of strength, numbness, or tremors  SKIN: No itching, burning, rashes, or lesions   ENDOCRINE: No heat or cold intolerance; No hair loss  MUSCULOSKELETAL: No joint pain or swelling; No muscle, back, or extremity pain  HEME/LYMPH: No easy bruising, or bleeding gums          Vital Signs Last 24 Hrs  T(C): 39 (05 Dec 2023 12:56), Max: 39 (05 Dec 2023 12:56)  T(F): 102.2 (05 Dec 2023 12:56), Max: 102.2 (05 Dec 2023 12:56)  HR: 89 (05 Dec 2023 18:29) (68 - 98)  BP: 115/59 (05 Dec 2023 18:29) (82/56 - 115/59)  BP(mean): --  RR: 19 (05 Dec 2023 18:29) (18 - 19)  SpO2: 95% (05 Dec 2023 18:29) (95% - 96%)    Parameters below as of 05 Dec 2023 18:29  Patient On (Oxygen Delivery Method): nasal cannula  O2 Flow (L/min): 2          PHYSICAL EXAM:  GENERAL: NAD, well-groomed, well-developed  HEAD:  Atraumatic, Normocephalic  NECK: Supple, No JVD, Normal thyroid  NERVOUS SYSTEM:  Alert & Oriented X3, Good concentration  CHEST/LUNG: Clear to percussion bilaterally; No rales, rhonchi, wheezing, or rubs  HEART: Regular rate and rhythm; No murmurs, rubs, or gallops  ABDOMEN: Soft, Nontender, Nondistended; Bowel sounds present  EXTREMITIES:  2+ Peripheral Pulses, No clubbing, cyanosis, or edema  SKIN: No rashes or lesions    INTERPRETATION OF TELEMETRY:    ECG:    I&O's Detail      LABS:                        7.9    27.06 )-----------( 239      ( 05 Dec 2023 14:10 )             24.3     12-05    130<L>  |  94<L>  |  79<HH>  ----------------------------<  261<H>  3.8   |  23  |  2.5<H>    Ca    8.1<L>      05 Dec 2023 14:10  Mg     2.0     12-    TPro  5.4<L>  /  Alb  2.5<L>  /  TBili  0.7  /  DBili  x   /  AST  33  /  ALT  34  /  AlkPhos  118<H>  12-05        PT/INR - ( 05 Dec 2023 13:00 )   PT: 17.50 sec;   INR: 1.53 ratio         PTT - ( 05 Dec 2023 13:00 )  PTT:27.9 sec  Urinalysis Basic - ( 05 Dec 2023 15:02 )    Color: Dark Yellow / Appearance: Turbid / S.017 / pH: x  Gluc: x / Ketone: Negative mg/dL  / Bili: Negative / Urobili: 1.0 mg/dL   Blood: x / Protein: 100 mg/dL / Nitrite: Negative   Leuk Esterase: Large / RBC: 1 /HPF / WBC >998 /HPF   Sq Epi: x / Non Sq Epi: 1 /HPF / Bacteria: Negative /HPF      I&O's Summary      RADIOLOGY & ADDITIONAL STUDIES:        norepinephrine Infusion 0.05 MICROgram(s)/kG/Min IV Continuous <Continuous>  spironolactone 25 milliGRAM(s) Oral daily  torsemide 20 milliGRAM(s) Oral daily

## 2023-12-06 NOTE — PROGRESS NOTE ADULT - ASSESSMENT
68-year-old male with PMH of HFrEF 15 to 20% s/p AICD, MR/TR, IDDM with neuropathy, hypoglycemia, HTN, HLD, CAD s/p MI s/p CABG s/p stent, history of in-stent thrombosis while on Plavix, PAD s/p 3L LE stents, TIAGO on CPAP at night, psoriasis, right AKA, frequent UTI's, presents to ED for evaluation s/p fall 2 days ago.    #Septic Shock  #MRSA bacteremia, unclear source, r/o endocarditis  #Hx of recurrent UTI  r/o endocarditis  Off pressors  - Cont IV meropenem and vancomycin  - ID eval  - f/u TTE --> JOHN PAUL if negative for vegetations  - repeat BCx qAM until clear  - f/u UCx    #Chronic HFrEF / s/p AICD for EF of 20%   #VHD   #H/o CAD / CABG  -hold antihypertensives  - Cont home meds    #Fall   #T8 vertebral body sclerosis   -OP Bone scan vs PET eval r/o malignancy    #MOISES on CKD III, in setting of sepsis and hypotension   Kidney function improving today  -serial Scr monitoring   -PT/ Rehab eval once improved     #H/o PAD s/p R AKA   #Dyslipidemia   #DM type II w/ Neuropathy  -c/w Aspirinr 81mg qD  - Cont prasugrel 10mg qD  -Lantus / Lispro ISS w/ F/S monitoring     #H/o RA   -c/w OP Rx    #Anemia - multifactorial, chronic disease and BAIRON  -OP work up including GI eval    #TIAGO   -CPAP   -pulse ox monitoring     #Progress Note Handoff  Pending (specify): Echo, repeat BCx, eval for endocarditis  Family discussion: adarsh pt regarding plan of care  Disposition: STR 68-year-old male with PMH of HFrEF 15 to 20% s/p AICD, MR/TR, IDDM with neuropathy, hypoglycemia, HTN, HLD, CAD s/p MI s/p CABG s/p stent, history of in-stent thrombosis while on Plavix, PAD s/p 3L LE stents, TIAGO on CPAP at night, psoriasis, right AKA, frequent UTI's, presents to ED for evaluation s/p fall 2 days ago.    #Septic Shock  #MRSA bacteremia, unclear source, r/o endocarditis  #Hx of recurrent UTI  r/o endocarditis  - Cont IV meropenem and vancomycin  - ID eval  - f/u TTE --> JOHN PAUL if negative for vegetations  - repeat BCx qAM until clear  - f/u UCx  - Wean pressors as tolerated    #Chronic HFrEF / s/p AICD for EF of 20%   #VHD   #H/o CAD / CABG  -hold antihypertensives  - Cont home meds    #Fall   #T8 vertebral body sclerosis   -OP Bone scan vs PET eval r/o malignancy    #MOISES on CKD III, in setting of sepsis and hypotension   Kidney function improving today  -serial Scr monitoring   -PT/ Rehab eval once improved     #H/o PAD s/p R AKA   #Dyslipidemia   #DM type II w/ Neuropathy  -c/w Aspirinr 81mg qD  - Cont prasugrel 10mg qD  -Lantus / Lispro ISS w/ F/S monitoring     #H/o RA   -c/w OP Rx    #Anemia - multifactorial, chronic disease and BAIRON  -OP work up including GI eval    #TIAGO   -CPAP   -pulse ox monitoring     #Progress Note Handoff  Pending (specify): Echo, repeat BCx, eval for endocarditis  Family discussion: adarsh pt regarding plan of care  Disposition: STR

## 2023-12-06 NOTE — ED ADULT NURSE REASSESSMENT NOTE - NS ED NURSE REASSESS COMMENT FT1
received pt from previous RN, pt post rapid response. MDs by bedside. A-line and central line in place. pt on drips as ordered. pt on cardiac monitoring.   family by bedside

## 2023-12-06 NOTE — PHARMACOTHERAPY INTERVENTION NOTE - COMMENTS
Blood culture from 12/5 updated to MRSA. Patient has not received any MRSA coverage at this time. Recommended vancomycin 1250mg (~18mg/kg) IV x 1 STAT. Since patient is in MOISES, recommended obtaining a vancomycin level on 12/7 with AM labs to assist with further dosing.

## 2023-12-06 NOTE — PATIENT PROFILE ADULT - FALL HARM RISK - HARM RISK INTERVENTIONS
Assistance with ambulation/Assistance OOB with selected safe patient handling equipment/Communicate Risk of Fall with Harm to all staff/Discuss with provider need for PT consult/Monitor gait and stability/Provide patient with walking aids - walker, cane, crutches/Reinforce activity limits and safety measures with patient and family/Review medications for side effects contributing to fall risk/Sit up slowly, dangle for a short time, stand at bedside before walking/Tailored Fall Risk Interventions/Toileting schedule using arm’s reach rule for commode and bathroom/Visual Cue: Yellow wristband and red socks/Bed in lowest position, wheels locked, appropriate side rails in place/Call bell, personal items and telephone in reach/Instruct patient to call for assistance before getting out of bed or chair/Non-slip footwear when patient is out of bed/[place] to call system/Physically safe environment - no spills, clutter or unnecessary equipment/Purposeful Proactive Rounding/Room/bathroom lighting operational, light cord in reach Assistance with ambulation/Assistance OOB with selected safe patient handling equipment/Communicate Risk of Fall with Harm to all staff/Discuss with provider need for PT consult/Monitor gait and stability/Provide patient with walking aids - walker, cane, crutches/Reinforce activity limits and safety measures with patient and family/Review medications for side effects contributing to fall risk/Sit up slowly, dangle for a short time, stand at bedside before walking/Tailored Fall Risk Interventions/Toileting schedule using arm’s reach rule for commode and bathroom/Visual Cue: Yellow wristband and red socks/Bed in lowest position, wheels locked, appropriate side rails in place/Call bell, personal items and telephone in reach/Instruct patient to call for assistance before getting out of bed or chair/Non-slip footwear when patient is out of bed/[place] to call system/Physically safe environment - no spills, clutter or unnecessary equipment/Purposeful Proactive Rounding/Room/bathroom lighting operational, light cord in reach

## 2023-12-06 NOTE — CONSULT NOTE ADULT - ATTENDING COMMENTS
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 and I and D   - recurrent septic arthritis of left knee with MSSA   - s/p right AKA 1/10/2022   - JOHN PAUL 1/2022 and Gallium 1/2022 negative   - completed 4 week course of daptomycin     #HFrEF s/p AICD  #DM II   #MOISES on CKD3 - pre-renal in setting of hypotension and decreased PO intake        RECOMMENDATIONS  - vancomycin 1250 mg x 1 given today -- check levels with AM labs   - stop meropenem   - follow-up TTE -- if negative, would get JOHN PAUL to ensure no lead vegetations   - Ideally MRI T spine/C Spine if ICD is MRI compatible   - check blood cx daily     Please call or message on Microsoft Teams if with any questions.  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  Spectra [phone]

## 2023-12-06 NOTE — ED ADULT NURSE NOTE - NSFALLRISKINTERV_ED_ALL_ED
Assistance OOB with selected safe patient handling equipment if applicable/Assistance with ambulation/Communicate fall risk and risk factors to all staff, patient, and family/Provide visual cue: yellow wristband, yellow gown, etc/Reinforce activity limits and safety measures with patient and family/Call bell, personal items and telephone in reach/Instruct patient to call for assistance before getting out of bed/chair/stretcher/Non-slip footwear applied when patient is off stretcher/[place] to call system/Physically safe environment - no spills, clutter or unnecessary equipment/Purposeful Proactive Rounding/Room/bathroom lighting operational, light cord in reach Assistance OOB with selected safe patient handling equipment if applicable/Assistance with ambulation/Communicate fall risk and risk factors to all staff, patient, and family/Provide visual cue: yellow wristband, yellow gown, etc/Reinforce activity limits and safety measures with patient and family/Call bell, personal items and telephone in reach/Instruct patient to call for assistance before getting out of bed/chair/stretcher/Non-slip footwear applied when patient is off stretcher/[place] to call system/Physically safe environment - no spills, clutter or unnecessary equipment/Purposeful Proactive Rounding/Room/bathroom lighting operational, light cord in reach

## 2023-12-06 NOTE — CONSULT NOTE ADULT - ASSESSMENT
ASSESSMENT  68yMale with PMH of HFrEF 15 to 20% s/p AICD, MR/TR, Type 2 DM with neuropathy, hypoglycemia, HTN, HLD, CAD s/p MI s/p CABG s/p stent, history of in-stent thrombosis while on Plavix, PAD s/p 3L LE stents, TIAGO on CPAP at night, psoriasis, right AKA, frequent UTI's, presents to ED from rehab for evaluation s/p fall 2 days ago.  History is inconsistent.  Wife at bedside states patient fell yesterday, unwitnessed and has been complaining of neck pain Since his fall.  Febrile today temp orally to 101.  Decreased appetite and p.o. intake over this time as well as generalized weakness.  Denies cough/congestion, CP, SOB, vomiting, diarrhea    In the ED: BP: 82/56, HR: 68, Temp: 102.2 , 96% on 2L, RR: 18      IMPRESSION  # Septic shock on admission, SIRS 2/4 w/ source  # UTI vs. pyelonephritis  # MOISES  - SIRS 2/4 with source  - Klebsiella pneumoniae ESBL on urine cx 06/05/23  - Blood cx 12/05/23 x1 positive for MRSA  - WBC increasing, 30.12K today.   - Currently on meropenem 1g IV q12H  - s/p 1.25g vancomycin IV loading dose today  - s/p cefepime 2g IV in ED    # Mechanical fall  # Anemia    RECOMMENDATIONS  - f/u pending urine cx and repeat blood cx  -     This is a pended note. All final recommendations to follow pending discussion with ID Attending      ASSESSMENT  68yMale with PMH of HFrEF 15 to 20% s/p AICD, MR/TR, Type 2 DM with neuropathy, hypoglycemia, HTN, HLD, CAD s/p MI s/p CABG s/p stent, history of in-stent thrombosis while on Plavix, PAD s/p 3L LE stents, TIAGO on CPAP at night, psoriasis, right AKA, frequent UTI's, presents to ED from rehab for evaluation s/p fall 2 days ago.  History is inconsistent.  Wife at bedside states patient fell yesterday, unwitnessed and has been complaining of neck pain Since his fall.  Febrile today temp orally to 101.  Decreased appetite and p.o. intake over this time as well as generalized weakness.  Denies cough/congestion, CP, SOB, vomiting, diarrhea    In the ED: BP: 82/56, HR: 68, Temp: 102.2 , 96% on 2L, RR: 18      IMPRESSION  # Septic shock on admission  # MRSA bacteremia  # UTI vs. pyelonephritis  # MOISES  # Hx of MRSA bacteremia  - On admission: WBC 27K, Lactate 2.8, BP: 82/56, HR: 68, Temp: 102.2  - UA: LE large, WBC: >998, Bacteria negative  -  06/05/23 Klebsiella pneumoniae ESBL on urine cx  - 01/05/22 MRSA in blood cx  - 01/06/22 R knee synovial fluid culture: MRSA  - MRSA nares +: s/p bactroban oint to nares top q12 for 5 days (6/7/23-6/12/23)  - Blood cx 12/05/23 x1 positive for MRSA  - WBC increasing, 30.12K today.   - Currently on meropenem 1g IV q12H  - s/p 1.25g vancomycin IV loading dose today  - s/p cefepime 2g IV in ED    # Mechanical fall  # LLE ulcers  # Acute on chronic normocytic anemia  # MOISES on CKD3 - pre-renal in setting of hypotension and decreased PO intake    #Hx of Anemia of chronic disease + iron def anemia  #Chronic HFrEF 20% (Last echo 5/2023)  # CAD; MI; s/p CABG; s/p stent  # PAD s/p stent and Right AKA  # DM T2 w/ neuropathy  # Hypothyroid; anti TPO Ab+:  # DLD  # Skin excoriations; periph neuropathy 2/2 DM and vasc          RECOMMENDATIONS  - f/u pending urine cx and repeat blood cx  - Discontinue meropenem 1g IV q12H  - Continue vancomycin IV, renally adjusted dosing  - TTE -> and JOHN PAUL if TTE negative to r/o AICD lead vegetations    This is a pended note. All final recommendations to follow pending discussion with ID Attending

## 2023-12-06 NOTE — CONSULT NOTE ADULT - ASSESSMENT
ASSESSMENT: 68-year-old male with PMH of HFrEF 15 to 20% s/p AICD, MR/TR, Type 2 DM with neuropathy, hypoglycemia, HTN, HLD, CAD s/p MI s/p CABG s/p stent, history of in-stent thrombosis while on Plavix, PAD s/p 3L LE stents, TIAGO on CPAP at night, psoriasis, right AKA, frequent UTI's, presents to ED from rehab for evaluation s/p fall 2 days prior to admission.  History is inconsistent. Febrile on day of admission temp orally to 101.     Patient had a recent admission a month ago w/ LLE arterial duplex performed that showed normal blood flow. Per the patient's wife at the bedside he had a LLE angiogram at another hospital recently that was reportedly normal.     Since admission patient found to be septic with active UTI, started on Levo for BP support, currently running at 0.25    Vascular surgery consulted for cyanosis of L toes    PLAN:   - No acute vascular surgery intervention at this time.   - In setting of recent normal LLE arterial duplex, no need to repeat study at this time.   - Wean pressors as tolerated, patient likely has some degree of distal extremity ischemia w/ history of PAD  - Apply warm packs to LLE     - Patient seen/examined or Plan Discussed with Fellow, Dr. Nolen  - Plan to be discussed with Attending, Dr. Campos

## 2023-12-06 NOTE — PHARMACOTHERAPY INTERVENTION NOTE - COMMENTS
Recommended discontinuing meropenem as per ID consult recommendations.    Yoel Rodriguez, PharmD, [facility]  Clinical Pharmacy Specialist, Infectious Diseases  Tele-Antimicrobial Stewardship Program (Tele-ASP)  Tele-ASP Phone: [phone]  Recommended discontinuing meropenem as per ID consult recommendations.    Yoel Rodriguez, PharmD, [facility]  Clinical Pharmacy Specialist, Infectious Diseases  Tele-Antimicrobial Stewardship Program (Tele-ASP)  Tele-ASP Phone: [phone]

## 2023-12-06 NOTE — PROCEDURE NOTE - NSPROCNAME_GEN_A_CORE
4/27/2018              [facility]        [id number] [address] [id number]         Dear Mirna Walker,    This letter is to inform you that our office has made attempts to reach you by phone without success.   We were attempting to
Central Line Insertion
Arterial Puncture/Cannulation

## 2023-12-06 NOTE — CONSULT NOTE ADULT - ASSESSMENT
IMPRESSION:    Sepsis POA  Septic shock  G+ bacteremia  HFrEF  Acute on chronic renal failure  UTI  DM  Psoriasis  PVD  TIAGO      PLAN:    CNS:  Avoid CNS depressant    HEENT:  Oral care    PULMONARY:  HOB @ 45 degrees, keep Sao2 92 to 96%, aspiration precaution    CARDIOVASCULAR: cardio fup, taper pressors, echo    GI: GI prophylaxis                                          Feeding per speech    RENAL:  F/u  lytes.  Correct as needed. accurate I/O, fields, renal US    INFECTIOUS DISEASE: vanco/ [place], fup cx, ID eval    HEMATOLOGICAL:  DVT prophylaxis. LE doppler    ENDOCRINE:  Follow up FS.  Insulin protocol if needed. cortisol level    very poor prognosis    GOC    SDU

## 2023-12-06 NOTE — CONSULT NOTE ADULT - SUBJECTIVE AND OBJECTIVE BOX
Patient is a 68y old  Male who presents with a chief complaint of     HPI:  68-year-old male with PMH of HFrEF 15 to 20% s/p AICD, MR/TR, Type 2 DM with neuropathy, hypoglycemia, HTN, HLD, CAD s/p MI s/p CABG s/p stent, history of in-stent thrombosis while on Plavix, PAD s/p 3L LE stents, TIAGO on CPAP at night, psoriasis, right AKA, frequent UTI's, presents to ED from rehab for evaluation s/p fall 2 days ago.  History is inconsistent.  Wife at bedside states patient fell yesterday, unwitnessed and has been complaining of neck pain Since his fall.  Febrile today temp orally to 101.  Decreased appetite and p.o. intake over this time as well as generalized weakness.  Denies cough/congestion, CP, SOB, vomiting, diarrhea    In the ED: BP: 82/56, HR: 68, Temp: 102.2 , 96% on 2L, RR: 18    Labs:  -> WBC: 27.06, H.9 (basline 10) MCV: 88, plt: 239  -> Lactate: 2.8 -> 1.6   -> Na: 130, K: 3.8, Cl: 94, BUN: 79, crea: 2.5 (baseline 1.1)  -> Glucose 261, ALP: 118, AST/ALT: 33/34  -> beta hydroxy: <0.2    -> UA: LE large, WBC: >998, Bacteria negative    CXR: pulmonary vascular congestion    CT Head and Spine negative for acute patholgy    CT chest, Abdo/pelv with IV con:  1.  No definite evidence of acute traumatic injury to the chest, abdomen or pelvis.  2.  T8 vertebral body complete sclerosis, new from prior exam.   Nonspecific though suspicious for metastatic disease if there is any known history of malignancy. Nuclear medicine bone scan can be considered for further evaluation as clinically appropriate.  3.  Trace bilateral pleural effusions, decreased from prior.      s/p 1L bolus, and cefepime and Meropenem once  Patient then started on Levophed 0.05 to improve BP and admitted to SDU (05 Dec 2023 21:16), This am remain on levophed 0.08      PAST MEDICAL & SURGICAL HISTORY:  Status post percutaneous transluminal coronary angioplasty  2 stents      Atherosclerosis of coronary artery  CAD (coronary artery disease)      Osteoarthritis      HLD (hyperlipidemia)      Diabetes  on insulin pump      CHF (congestive heart failure)  EF ~ 25%      History of celiac disease      Diverticulitis      STEMI (ST elevation myocardial infarction)      Diabetic neuropathy  Hands and Feet      Anxiety and depression      Other postprocedural status  Fixation hardware in foot LEFT      Stented coronary artery  10/18 heart attack  INFERIOR WALL MI      S/P CABG x 1  2018      S/P TKR (total knee replacement), right  with infection Mrsa   per pt he was cleared from MRSA infection      Surgery, elective  right knee wound debridement      History of open reduction and internal fixation (ORIF) procedure  right hip      H/O shoulder surgery  right      AICD (automatic cardioverter/defibrillator) present  St Kali      Cholecystostomy care  drain inserted  & removed 4 months ago      History of tonsillectomy      History of hip replacement, total, right      Elective surgery  plastic surgery Left shin          SOCIAL HX:   Smoking  -    FAMILY HISTORY:  Family history of heart disease (Mother)    Family history of allergies  daughter        REVIEW OF SYSTEM see hpi	    Allergies    Atorvastatin Calcium (Unknown)  Entresto (Swelling)  ACE inhibitors (Rash)  Bactrim (Unknown)  Plavix (Unknown)    Intolerances        aspirin enteric coated 81 milliGRAM(s) Oral daily  bethanechol 25 milliGRAM(s) Oral daily  dextrose 5%. 1000 milliLiter(s) IV Continuous <Continuous>  dextrose 5%. 1000 milliLiter(s) IV Continuous <Continuous>  dextrose 50% Injectable 25 Gram(s) IV Push once  dextrose 50% Injectable 12.5 Gram(s) IV Push once  dextrose 50% Injectable 25 Gram(s) IV Push once  dextrose Oral Gel 15 Gram(s) Oral once PRN  diazepam    Tablet 1 milliGRAM(s) Oral at bedtime PRN  DULoxetine 30 milliGRAM(s) Oral <User Schedule>  folic acid 1 milliGRAM(s) Oral daily  glucagon  Injectable 1 milliGRAM(s) IntraMuscular once  heparin   Injectable 5000 Unit(s) SubCutaneous every 12 hours  insulin glargine Injectable (LANTUS) 8 Unit(s) SubCutaneous every morning  insulin lispro (ADMELOG) corrective regimen sliding scale   SubCutaneous three times a day before meals  levothyroxine 25 MICROGram(s) Oral daily  meropenem  IVPB 1000 milliGRAM(s) IV Intermittent every 12 hours  norepinephrine Infusion 0.05 MICROgram(s)/kG/Min IV Continuous <Continuous>  oxycodone    5 mG/acetaminophen 325 mG 1 Tablet(s) Oral every 6 hours PRN  pantoprazole    Tablet 40 milliGRAM(s) Oral before breakfast  prasugrel 10 milliGRAM(s) Oral daily  : Home Meds:      PHYSICAL EXAM    ICU Vital Signs Last 24 Hrs  T(C): 39 (05 Dec 2023 12:56), Max: 39 (05 Dec 2023 12:56)  T(F): 102.2 (05 Dec 2023 12:56), Max: 102.2 (05 Dec 2023 12:56)  HR: 101 (06 Dec 2023 03:21) (68 - 102)  BP: 119/56 (06 Dec 2023 03:21) (81/43 - 119/56)  BP(mean): 81 (06 Dec 2023 03:21) (58 - 81)  RR: 16 (06 Dec 2023 03:21) (16 - 19)  SpO2: 95% (06 Dec 2023 03:21) (95% - 98%)    O2 Parameters below as of 06 Dec 2023 03:21  Patient On (Oxygen Delivery Method): nasal cannula  O2 Flow (L/min): 2          General: ILL looking  Lungs: dec bs both bases  Cardiovascular: YULISA 2/6  Abdomen: Soft, Positive BS  Extremities: No clubbing  Neurological: Non focal         LABS:                          7.9    27.06 )-----------( 239      ( 05 Dec 2023 14:10 )             24.3                                               12-05    130<L>  |  94<L>  |  79<HH>  ----------------------------<  261<H>  3.8   |  23  |  2.5<H>    Ca    8.1<L>      05 Dec 2023 14:10  Mg     2.0     12-05    TPro  5.4<L>  /  Alb  2.5<L>  /  TBili  0.7  /  DBili  x   /  AST  33  /  ALT  34  /  AlkPhos  118<H>  12-05      PT/INR - ( 05 Dec 2023 13:00 )   PT: 17.50 sec;   INR: 1.53 ratio         PTT - ( 05 Dec 2023 13:00 )  PTT:27.9 sec                                       Urinalysis Basic - ( 05 Dec 2023 15:02 )    Color: Dark Yellow / Appearance: Turbid / S.017 / pH: x  Gluc: x / Ketone: Negative mg/dL  / Bili: Negative / Urobili: 1.0 mg/dL   Blood: x / Protein: 100 mg/dL / Nitrite: Negative   Leuk Esterase: Large / RBC: 1 /HPF / WBC >998 /HPF   Sq Epi: x / Non Sq Epi: 1 /HPF / Bacteria: Negative /HPF                                                  LIVER FUNCTIONS - ( 05 Dec 2023 14:10 )  Alb: 2.5 g/dL / Pro: 5.4 g/dL / ALK PHOS: 118 U/L / ALT: 34 U/L / AST: 33 U/L / GGT: x                                                  Culture - Blood (collected 05 Dec 2023 13:00)  Source: .Blood Blood-Peripheral  Gram Stain (06 Dec 2023 06:30):    Growth in aerobic bottle: Gram Positive Cocci in Clusters    Growth in anaerobic bottle: Gram Positive Cocci in Clusters  Preliminary Report (06 Dec 2023 06:31):    Growth in aerobic bottle: Gram Positive Cocci in Clusters    Growth in anaerobic bottle: Gram Positive Cocci in Clusters    Direct identification is available within approximately 3-5    hours either by Blood Panel Multiplexed PCR or Direct    MALDI-TOF. Details: [email or web address]                                                                                               MEDICATIONS  (STANDING):  aspirin enteric coated 81 milliGRAM(s) Oral daily  bethanechol 25 milliGRAM(s) Oral daily  dextrose 5%. 1000 milliLiter(s) (50 mL/Hr) IV Continuous <Continuous>  dextrose 5%. 1000 milliLiter(s) (100 mL/Hr) IV Continuous <Continuous>  dextrose 50% Injectable 25 Gram(s) IV Push once  dextrose 50% Injectable 12.5 Gram(s) IV Push once  dextrose 50% Injectable 25 Gram(s) IV Push once  DULoxetine 30 milliGRAM(s) Oral <User Schedule>  folic acid 1 milliGRAM(s) Oral daily  glucagon  Injectable 1 milliGRAM(s) IntraMuscular once  heparin   Injectable 5000 Unit(s) SubCutaneous every 12 hours  insulin glargine Injectable (LANTUS) 8 Unit(s) SubCutaneous every morning  insulin lispro (ADMELOG) corrective regimen sliding scale   SubCutaneous three times a day before meals  levothyroxine 25 MICROGram(s) Oral daily  meropenem  IVPB 1000 milliGRAM(s) IV Intermittent every 12 hours  norepinephrine Infusion 0.05 MICROgram(s)/kG/Min (6.38 mL/Hr) IV Continuous <Continuous>  pantoprazole    Tablet 40 milliGRAM(s) Oral before breakfast  prasugrel 10 milliGRAM(s) Oral daily    MEDICATIONS  (PRN):  dextrose Oral Gel 15 Gram(s) Oral once PRN Blood Glucose LESS THAN 70 milliGRAM(s)/deciliter  diazepam    Tablet 1 milliGRAM(s) Oral at bedtime PRN Anxiety/Insomnia  oxycodone    5 mG/acetaminophen 325 mG 1 Tablet(s) Oral every 6 hours PRN for severe pain      PAN CT NOTED   Patient is a 68y old  Male who presents with a chief complaint of     HPI:  68-year-old male with PMH of HFrEF 15 to 20% s/p AICD, MR/TR, Type 2 DM with neuropathy, hypoglycemia, HTN, HLD, CAD s/p MI s/p CABG s/p stent, history of in-stent thrombosis while on Plavix, PAD s/p 3L LE stents, TIAGO on CPAP at night, psoriasis, right AKA, frequent UTI's, presents to ED from rehab for evaluation s/p fall 2 days ago.  History is inconsistent.  Wife at bedside states patient fell yesterday, unwitnessed and has been complaining of neck pain Since his fall.  Febrile today temp orally to 101.  Decreased appetite and p.o. intake over this time as well as generalized weakness.  Denies cough/congestion, CP, SOB, vomiting, diarrhea    In the ED: BP: 82/56, HR: 68, Temp: 102.2 , 96% on 2L, RR: 18    Labs:  -> WBC: 27.06, H.9 (basline 10) MCV: 88, plt: 239  -> Lactate: 2.8 -> 1.6   -> Na: 130, K: 3.8, Cl: 94, BUN: 79, crea: 2.5 (baseline 1.1)  -> Glucose 261, ALP: 118, AST/ALT: 33/34  -> beta hydroxy: <0.2    -> UA: LE large, WBC: >998, Bacteria negative    CXR: pulmonary vascular congestion    CT Head and Spine negative for acute patholgy    CT chest, Abdo/pelv with IV con:  1.  No definite evidence of acute traumatic injury to the chest, abdomen or pelvis.  2.  T8 vertebral body complete sclerosis, new from prior exam.   Nonspecific though suspicious for metastatic disease if there is any known history of malignancy. Nuclear medicine bone scan can be considered for further evaluation as clinically appropriate.  3.  Trace bilateral pleural effusions, decreased from prior.      s/p 1L bolus, and cefepime and Meropenem once  Patient then started on Levophed 0.05 to improve BP and admitted to SDU (05 Dec 2023 21:16), This am remain on levophed 0.08      PAST MEDICAL & SURGICAL HISTORY:  Status post percutaneous transluminal coronary angioplasty  2 stents      Atherosclerosis of coronary artery  CAD (coronary artery disease)      Osteoarthritis      HLD (hyperlipidemia)      Diabetes  on insulin pump      CHF (congestive heart failure)  EF ~ 25%      History of celiac disease      Diverticulitis      STEMI (ST elevation myocardial infarction)      Diabetic neuropathy  Hands and Feet      Anxiety and depression      Other postprocedural status  Fixation hardware in foot LEFT      Stented coronary artery  10/18 heart attack  INFERIOR WALL MI      S/P CABG x 1  2018      S/P TKR (total knee replacement), right  with infection Mrsa   per pt he was cleared from MRSA infection      Surgery, elective  right knee wound debridement      History of open reduction and internal fixation (ORIF) procedure  right hip      H/O shoulder surgery  right      AICD (automatic cardioverter/defibrillator) present  St Kali      Cholecystostomy care  drain inserted  & removed 4 months ago      History of tonsillectomy      History of hip replacement, total, right      Elective surgery  plastic surgery Left shin          SOCIAL HX:   Smoking  -    FAMILY HISTORY:  Family history of heart disease (Mother)    Family history of allergies  daughter        REVIEW OF SYSTEM see hpi	    Allergies    Atorvastatin Calcium (Unknown)  Entresto (Swelling)  ACE inhibitors (Rash)  Bactrim (Unknown)  Plavix (Unknown)    Intolerances        aspirin enteric coated 81 milliGRAM(s) Oral daily  bethanechol 25 milliGRAM(s) Oral daily  dextrose 5%. 1000 milliLiter(s) IV Continuous <Continuous>  dextrose 5%. 1000 milliLiter(s) IV Continuous <Continuous>  dextrose 50% Injectable 25 Gram(s) IV Push once  dextrose 50% Injectable 12.5 Gram(s) IV Push once  dextrose 50% Injectable 25 Gram(s) IV Push once  dextrose Oral Gel 15 Gram(s) Oral once PRN  diazepam    Tablet 1 milliGRAM(s) Oral at bedtime PRN  DULoxetine 30 milliGRAM(s) Oral <User Schedule>  folic acid 1 milliGRAM(s) Oral daily  glucagon  Injectable 1 milliGRAM(s) IntraMuscular once  heparin   Injectable 5000 Unit(s) SubCutaneous every 12 hours  insulin glargine Injectable (LANTUS) 8 Unit(s) SubCutaneous every morning  insulin lispro (ADMELOG) corrective regimen sliding scale   SubCutaneous three times a day before meals  levothyroxine 25 MICROGram(s) Oral daily  meropenem  IVPB 1000 milliGRAM(s) IV Intermittent every 12 hours  norepinephrine Infusion 0.05 MICROgram(s)/kG/Min IV Continuous <Continuous>  oxycodone    5 mG/acetaminophen 325 mG 1 Tablet(s) Oral every 6 hours PRN  pantoprazole    Tablet 40 milliGRAM(s) Oral before breakfast  prasugrel 10 milliGRAM(s) Oral daily  : Home Meds:      PHYSICAL EXAM    ICU Vital Signs Last 24 Hrs  T(C): 39 (05 Dec 2023 12:56), Max: 39 (05 Dec 2023 12:56)  T(F): 102.2 (05 Dec 2023 12:56), Max: 102.2 (05 Dec 2023 12:56)  HR: 101 (06 Dec 2023 03:21) (68 - 102)  BP: 119/56 (06 Dec 2023 03:21) (81/43 - 119/56)  BP(mean): 81 (06 Dec 2023 03:21) (58 - 81)  RR: 16 (06 Dec 2023 03:21) (16 - 19)  SpO2: 95% (06 Dec 2023 03:21) (95% - 98%)    O2 Parameters below as of 06 Dec 2023 03:21  Patient On (Oxygen Delivery Method): nasal cannula  O2 Flow (L/min): 2          General: ILL looking  Lungs: dec bs both bases  Cardiovascular: YULISA 2/6  Abdomen: Soft, Positive BS  Extremities: No clubbing  Neurological: Non focal         LABS:                          7.9    27.06 )-----------( 239      ( 05 Dec 2023 14:10 )             24.3                                               12-05    130<L>  |  94<L>  |  79<HH>  ----------------------------<  261<H>  3.8   |  23  |  2.5<H>    Ca    8.1<L>      05 Dec 2023 14:10  Mg     2.0     12-05    TPro  5.4<L>  /  Alb  2.5<L>  /  TBili  0.7  /  DBili  x   /  AST  33  /  ALT  34  /  AlkPhos  118<H>  12-05      PT/INR - ( 05 Dec 2023 13:00 )   PT: 17.50 sec;   INR: 1.53 ratio         PTT - ( 05 Dec 2023 13:00 )  PTT:27.9 sec                                       Urinalysis Basic - ( 05 Dec 2023 15:02 )    Color: Dark Yellow / Appearance: Turbid / S.017 / pH: x  Gluc: x / Ketone: Negative mg/dL  / Bili: Negative / Urobili: 1.0 mg/dL   Blood: x / Protein: 100 mg/dL / Nitrite: Negative   Leuk Esterase: Large / RBC: 1 /HPF / WBC >998 /HPF   Sq Epi: x / Non Sq Epi: 1 /HPF / Bacteria: Negative /HPF                                                  LIVER FUNCTIONS - ( 05 Dec 2023 14:10 )  Alb: 2.5 g/dL / Pro: 5.4 g/dL / ALK PHOS: 118 U/L / ALT: 34 U/L / AST: 33 U/L / GGT: x                                                  Culture - Blood (collected 05 Dec 2023 13:00)  Source: .Blood Blood-Peripheral  Gram Stain (06 Dec 2023 06:30):    Growth in aerobic bottle: Gram Positive Cocci in Clusters    Growth in anaerobic bottle: Gram Positive Cocci in Clusters  Preliminary Report (06 Dec 2023 06:31):    Growth in aerobic bottle: Gram Positive Cocci in Clusters    Growth in anaerobic bottle: Gram Positive Cocci in Clusters    Direct identification is available within approximately 3-5    hours either by Blood Panel Multiplexed PCR or Direct    MALDI-TOF. Details: [email or web address]                                                                                               MEDICATIONS  (STANDING):  aspirin enteric coated 81 milliGRAM(s) Oral daily  bethanechol 25 milliGRAM(s) Oral daily  dextrose 5%. 1000 milliLiter(s) (50 mL/Hr) IV Continuous <Continuous>  dextrose 5%. 1000 milliLiter(s) (100 mL/Hr) IV Continuous <Continuous>  dextrose 50% Injectable 25 Gram(s) IV Push once  dextrose 50% Injectable 12.5 Gram(s) IV Push once  dextrose 50% Injectable 25 Gram(s) IV Push once  DULoxetine 30 milliGRAM(s) Oral <User Schedule>  folic acid 1 milliGRAM(s) Oral daily  glucagon  Injectable 1 milliGRAM(s) IntraMuscular once  heparin   Injectable 5000 Unit(s) SubCutaneous every 12 hours  insulin glargine Injectable (LANTUS) 8 Unit(s) SubCutaneous every morning  insulin lispro (ADMELOG) corrective regimen sliding scale   SubCutaneous three times a day before meals  levothyroxine 25 MICROGram(s) Oral daily  meropenem  IVPB 1000 milliGRAM(s) IV Intermittent every 12 hours  norepinephrine Infusion 0.05 MICROgram(s)/kG/Min (6.38 mL/Hr) IV Continuous <Continuous>  pantoprazole    Tablet 40 milliGRAM(s) Oral before breakfast  prasugrel 10 milliGRAM(s) Oral daily    MEDICATIONS  (PRN):  dextrose Oral Gel 15 Gram(s) Oral once PRN Blood Glucose LESS THAN 70 milliGRAM(s)/deciliter  diazepam    Tablet 1 milliGRAM(s) Oral at bedtime PRN Anxiety/Insomnia  oxycodone    5 mG/acetaminophen 325 mG 1 Tablet(s) Oral every 6 hours PRN for severe pain      PAN CT NOTED

## 2023-12-06 NOTE — PROGRESS NOTE ADULT - SUBJECTIVE AND OBJECTIVE BOX
FLOWER MARISCAL  68y, Male  Allergy: Atorvastatin Calcium (Unknown)  Entresto (Swelling)  ACE inhibitors (Rash)  Bactrim (Unknown)  Plavix (Unknown)    Hospital Day: 1d    Patient seen and examined. No acute events overnight    PMH/PSH:  PAST MEDICAL & SURGICAL HISTORY:  Status post percutaneous transluminal coronary angioplasty  2 stents      Atherosclerosis of coronary artery  CAD (coronary artery disease)      Osteoarthritis      HLD (hyperlipidemia)      Diabetes  on insulin pump      CHF (congestive heart failure)  EF ~ 25%      History of celiac disease      Diverticulitis      STEMI (ST elevation myocardial infarction)      Diabetic neuropathy  Hands and Feet      Anxiety and depression      Other postprocedural status  Fixation hardware in foot LEFT      Stented coronary artery  10/18 heart attack  INFERIOR WALL MI      S/P CABG x 1  2018      S/P TKR (total knee replacement), right  with infection Mrsa   per pt he was cleared from MRSA infection      Surgery, elective  right knee wound debridement      History of open reduction and internal fixation (ORIF) procedure  right hip      H/O shoulder surgery  right      AICD (automatic cardioverter/defibrillator) present  [facility]      Cholecystostomy care  drain inserted 2020 & removed 4 months ago      History of tonsillectomy      History of hip replacement, total, right      Elective surgery  plastic surgery Left shin          VITALS:  T(F): 97.6 (12-06-23 @ 09:14), Max: 102.2 (12-05-23 @ 12:56)  HR: 106 (12-06-23 @ 12:39)  BP: 119/66 (12-06-23 @ 12:39) (81/43 - 119/66)  RR: 18 (12-06-23 @ 12:39)  SpO2: 99% (12-06-23 @ 12:39)    TESTS & MEASUREMENTS:  Weight (Kg): 67.1 (12-05-23 @ 20:25)  BMI (kg/m2): 20.1 (12-05)                          9.8    30.12 )-----------( 132      ( 06 Dec 2023 07:02 )             29.4     PT/INR - ( 06 Dec 2023 07:02 )   PT: 15.70 sec;   INR: 1.37 ratio         PTT - ( 06 Dec 2023 07:02 )  PTT:26.2 sec  12-06    134<L>  |  96<L>  |  70<HH>  ----------------------------<  309<H>  3.9   |  22  |  1.7<H>    Ca    8.7      06 Dec 2023 07:02  Mg     2.1     12-06    TPro  6.3  /  Alb  2.9<L>  /  TBili  1.2  /  DBili  x   /  AST  33  /  ALT  41  /  AlkPhos  165<H>  12-06    LIVER FUNCTIONS - ( 06 Dec 2023 07:02 )  Alb: 2.9 g/dL / Pro: 6.3 g/dL / ALK PHOS: 165 U/L / ALT: 41 U/L / AST: 33 U/L / GGT: x                 Culture - Blood (collected 12-05-23 @ 13:00)  Source: .Blood Blood-Peripheral  Gram Stain (12-06-23 @ 06:30):    Growth in aerobic bottle: Gram Positive Cocci in Clusters    Growth in anaerobic bottle: Gram Positive Cocci in Clusters  Preliminary Report (12-06-23 @ 06:31):    Growth in aerobic bottle: Gram Positive Cocci in Clusters    Growth in anaerobic bottle: Gram Positive Cocci in Clusters    Direct identification is available within approximately 3-5    hours either by Blood Panel Multiplexed PCR or Direct    MALDI-TOF. Details: [email or web address]  Organism: Blood Culture PCR (12-06-23 @ 07:23)  Organism: Blood Culture PCR (12-06-23 @ 07:23)      Method Type: PCR      -  Methicillin resistant Staphylococcus aureus (MRSA): Detec      Urinalysis Basic - ( 06 Dec 2023 07:02 )    Color: x / Appearance: x / SG: x / pH: x  Gluc: 309 mg/dL / Ketone: x  / Bili: x / Urobili: x   Blood: x / Protein: x / Nitrite: x   Leuk Esterase: x / RBC: x / WBC x   Sq Epi: x / Non Sq Epi: x / Bacteria: x        RADIOLOGY & ADDITIONAL TESTS:    RECENT DIAGNOSTIC ORDERS:  Magnesium: AM Sched. Collection: 07-Dec-2023 04:30 (12-06-23 @ 10:02)  Basic Metabolic Panel: AM Sched. Collection: 07-Dec-2023 04:30 (12-06-23 @ 10:02)  Complete Blood Count + Automated Diff: AM Sched. Collection: 07-Dec-2023 04:30 (12-06-23 @ 10:02)  Vancomycin Level, Random: AM Sched. Collection: 07-Dec-2023 04:30 (12-06-23 @ 09:16)  Thyroid Stimulating Hormone, Serum: AM Sched. Collection: 06-Dec-2023 04:30 (12-05-23 @ 23:35)  Diet, Easy to Chew:   Consistent Carbohydrate No Snacks  DASH/TLC Sodium & Cholesterol Restricted  1500mL Fluid Restriction ([id number])  Prosource Gelatein 20 Sugar Free     Qty per Day:  3 (12-05-23 @ 23:31)  ABO Rh Confirmatory Specimen: Routine (12-05-23 @ 23:26)  Culture - Blood: AM Sched. Collection:06-Dec-2023 04:30  Specimen Source: Blood (12-05-23 @ 23:26)  Procalcitonin, Serum: AM Sched. Collection: 06-Dec-2023 04:30 (12-05-23 @ 23:26)  A1C with Estimated Average Glucose: AM Sched. Collection: 06-Dec-2023 04:30 (12-05-23 @ 23:01)  Culture - Urine: Routine  Specimen Source: Clean Catch (Midstream) (12-05-23 @ 12:49)  Culture - Blood: Routine  Specimen Source: Blood-Peripheral  Addl Info: Peripheral Site 2 (12-05-23 @ 12:49)  Blood Gas Profile w/Lytes - Venous: STAT (12-05-23 @ 12:49)      MEDICATIONS:  MEDICATIONS  (STANDING):  aspirin enteric coated 81 milliGRAM(s) Oral daily  bethanechol 25 milliGRAM(s) Oral daily  dextrose 5%. 1000 milliLiter(s) (50 mL/Hr) IV Continuous <Continuous>  dextrose 5%. 1000 milliLiter(s) (100 mL/Hr) IV Continuous <Continuous>  dextrose 50% Injectable 25 Gram(s) IV Push once  dextrose 50% Injectable 12.5 Gram(s) IV Push once  dextrose 50% Injectable 25 Gram(s) IV Push once  DULoxetine 30 milliGRAM(s) Oral <User Schedule>  folic acid 1 milliGRAM(s) Oral daily  glucagon  Injectable 1 milliGRAM(s) IntraMuscular once  heparin   Injectable 5000 Unit(s) SubCutaneous every 12 hours  insulin glargine Injectable (LANTUS) 8 Unit(s) SubCutaneous every morning  insulin lispro (ADMELOG) corrective regimen sliding scale   SubCutaneous three times a day before meals  levothyroxine 25 MICROGram(s) Oral daily  meropenem  IVPB 1000 milliGRAM(s) IV Intermittent every 12 hours  norepinephrine Infusion 0.05 MICROgram(s)/kG/Min (6.38 mL/Hr) IV Continuous <Continuous>  pantoprazole    Tablet 40 milliGRAM(s) Oral before breakfast  prasugrel 10 milliGRAM(s) Oral daily  vancomycin  IVPB 1250 milliGRAM(s) IV Intermittent once    MEDICATIONS  (PRN):  dextrose Oral Gel 15 Gram(s) Oral once PRN Blood Glucose LESS THAN 70 milliGRAM(s)/deciliter  diazepam    Tablet 1 milliGRAM(s) Oral at bedtime PRN Anxiety/Insomnia  oxycodone    5 mG/acetaminophen 325 mG 1 Tablet(s) Oral every 6 hours PRN for severe pain      HOME MEDICATIONS:  aspirin 81 mg oral delayed release tablet (12-05)  bethanechol 25 mg oral tablet (12-05)  diazePAM 2 mg oral tablet (12-05)  DULoxetine 30 mg oral delayed release capsule (12-05)  folic acid 1 mg oral tablet (12-05)  insulin lispro 100 units/mL injectable solution (12-05)  levothyroxine 25 mcg (0.025 mg) oral tablet (12-05)  losartan 25 mg oral tablet (12-05)  methotrexate 2.5 mg oral tablet (12-05)  metoprolol succinate 25 mg oral tablet, extended release (12-05)  Multiple Vitamins oral tablet (12-05)  oxycodone-acetaminophen 5 mg-325 mg oral tablet (12-05)  pantoprazole 40 mg oral delayed release tablet (12-05)  prasugrel 10 mg oral tablet (12-05)  rosuvastatin 40 mg oral tablet (12-05)  spironolactone 25 mg oral tablet (12-05)      REVIEW OF SYSTEMS:  All other review of systems is negative unless indicated above.     PHYSICAL EXAM:  PHYSICAL EXAM:  GENERAL: NAD  HEAD:  Atraumatic, Normocephalic  NECK: Supple, No JVD  CHEST/LUNG: Clear to auscultation bilaterally; No wheeze  HEART: Regular rate and rhythm; No murmurs, rubs, or gallops  ABDOMEN: Soft, Nontender, Nondistended; Bowel sounds present  EXTREMITIES:  2+ Peripheral Pulses, No clubbing, cyanosis, or edema  SKIN: No rashes or lesions         FLOWER MARISCAL  68y, Male  Allergy: Atorvastatin Calcium (Unknown)  Entresto (Swelling)  ACE inhibitors (Rash)  Bactrim (Unknown)  Plavix (Unknown)    Hospital Day: 1d    Patient seen and examined. No acute events overnight    PMH/PSH:  PAST MEDICAL & SURGICAL HISTORY:  Status post percutaneous transluminal coronary angioplasty  2 stents      Atherosclerosis of coronary artery  CAD (coronary artery disease)      Osteoarthritis      HLD (hyperlipidemia)      Diabetes  on insulin pump      CHF (congestive heart failure)  EF ~ 25%      History of celiac disease      Diverticulitis      STEMI (ST elevation myocardial infarction)      Diabetic neuropathy  Hands and Feet      Anxiety and depression      Other postprocedural status  Fixation hardware in foot LEFT      Stented coronary artery  10/18 heart attack  INFERIOR WALL MI      S/P CABG x 1  2018      S/P TKR (total knee replacement), right  with infection Mrsa   per pt he was cleared from MRSA infection      Surgery, elective  right knee wound debridement      History of open reduction and internal fixation (ORIF) procedure  right hip      H/O shoulder surgery  right      AICD (automatic cardioverter/defibrillator) present  St Kali      Cholecystostomy care  drain inserted 2020 & removed 4 months ago      History of tonsillectomy      History of hip replacement, total, right      Elective surgery  plastic surgery Left shin          VITALS:  T(F): 97.6 (12-06-23 @ 09:14), Max: 102.2 (12-05-23 @ 12:56)  HR: 106 (12-06-23 @ 12:39)  BP: 119/66 (12-06-23 @ 12:39) (81/43 - 119/66)  RR: 18 (12-06-23 @ 12:39)  SpO2: 99% (12-06-23 @ 12:39)    TESTS & MEASUREMENTS:  Weight (Kg): 67.1 (12-05-23 @ 20:25)  BMI (kg/m2): 20.1 (12-05)                          9.8    30.12 )-----------( 132      ( 06 Dec 2023 07:02 )             29.4     PT/INR - ( 06 Dec 2023 07:02 )   PT: 15.70 sec;   INR: 1.37 ratio         PTT - ( 06 Dec 2023 07:02 )  PTT:26.2 sec  12-06    134<L>  |  96<L>  |  70<HH>  ----------------------------<  309<H>  3.9   |  22  |  1.7<H>    Ca    8.7      06 Dec 2023 07:02  Mg     2.1     12-06    TPro  6.3  /  Alb  2.9<L>  /  TBili  1.2  /  DBili  x   /  AST  33  /  ALT  41  /  AlkPhos  165<H>  12-06    LIVER FUNCTIONS - ( 06 Dec 2023 07:02 )  Alb: 2.9 g/dL / Pro: 6.3 g/dL / ALK PHOS: 165 U/L / ALT: 41 U/L / AST: 33 U/L / GGT: x                 Culture - Blood (collected 12-05-23 @ 13:00)  Source: .Blood Blood-Peripheral  Gram Stain (12-06-23 @ 06:30):    Growth in aerobic bottle: Gram Positive Cocci in Clusters    Growth in anaerobic bottle: Gram Positive Cocci in Clusters  Preliminary Report (12-06-23 @ 06:31):    Growth in aerobic bottle: Gram Positive Cocci in Clusters    Growth in anaerobic bottle: Gram Positive Cocci in Clusters    Direct identification is available within approximately 3-5    hours either by Blood Panel Multiplexed PCR or Direct    MALDI-TOF. Details: [email or web address]  Organism: Blood Culture PCR (12-06-23 @ 07:23)  Organism: Blood Culture PCR (12-06-23 @ 07:23)      Method Type: PCR      -  Methicillin resistant Staphylococcus aureus (MRSA): Detec      Urinalysis Basic - ( 06 Dec 2023 07:02 )    Color: x / Appearance: x / SG: x / pH: x  Gluc: 309 mg/dL / Ketone: x  / Bili: x / Urobili: x   Blood: x / Protein: x / Nitrite: x   Leuk Esterase: x / RBC: x / WBC x   Sq Epi: x / Non Sq Epi: x / Bacteria: x        RADIOLOGY & ADDITIONAL TESTS:    RECENT DIAGNOSTIC ORDERS:  Magnesium: AM Sched. Collection: 07-Dec-2023 04:30 (12-06-23 @ 10:02)  Basic Metabolic Panel: AM Sched. Collection: 07-Dec-2023 04:30 (12-06-23 @ 10:02)  Complete Blood Count + Automated Diff: AM Sched. Collection: 07-Dec-2023 04:30 (12-06-23 @ 10:02)  Vancomycin Level, Random: AM Sched. Collection: 07-Dec-2023 04:30 (12-06-23 @ 09:16)  Thyroid Stimulating Hormone, Serum: AM Sched. Collection: 06-Dec-2023 04:30 (12-05-23 @ 23:35)  Diet, Easy to Chew:   Consistent Carbohydrate No Snacks  DASH/TLC Sodium & Cholesterol Restricted  1500mL Fluid Restriction ([id number])  Prosource Gelatein 20 Sugar Free     Qty per Day:  3 (12-05-23 @ 23:31)  ABO Rh Confirmatory Specimen: Routine (12-05-23 @ 23:26)  Culture - Blood: AM Sched. Collection:06-Dec-2023 04:30  Specimen Source: Blood (12-05-23 @ 23:26)  Procalcitonin, Serum: AM Sched. Collection: 06-Dec-2023 04:30 (12-05-23 @ 23:26)  A1C with Estimated Average Glucose: AM Sched. Collection: 06-Dec-2023 04:30 (12-05-23 @ 23:01)  Culture - Urine: Routine  Specimen Source: Clean Catch (Midstream) (12-05-23 @ 12:49)  Culture - Blood: Routine  Specimen Source: Blood-Peripheral  Addl Info: Peripheral Site 2 (12-05-23 @ 12:49)  Blood Gas Profile w/Lytes - Venous: STAT (12-05-23 @ 12:49)      MEDICATIONS:  MEDICATIONS  (STANDING):  aspirin enteric coated 81 milliGRAM(s) Oral daily  bethanechol 25 milliGRAM(s) Oral daily  dextrose 5%. 1000 milliLiter(s) (50 mL/Hr) IV Continuous <Continuous>  dextrose 5%. 1000 milliLiter(s) (100 mL/Hr) IV Continuous <Continuous>  dextrose 50% Injectable 25 Gram(s) IV Push once  dextrose 50% Injectable 12.5 Gram(s) IV Push once  dextrose 50% Injectable 25 Gram(s) IV Push once  DULoxetine 30 milliGRAM(s) Oral <User Schedule>  folic acid 1 milliGRAM(s) Oral daily  glucagon  Injectable 1 milliGRAM(s) IntraMuscular once  heparin   Injectable 5000 Unit(s) SubCutaneous every 12 hours  insulin glargine Injectable (LANTUS) 8 Unit(s) SubCutaneous every morning  insulin lispro (ADMELOG) corrective regimen sliding scale   SubCutaneous three times a day before meals  levothyroxine 25 MICROGram(s) Oral daily  meropenem  IVPB 1000 milliGRAM(s) IV Intermittent every 12 hours  norepinephrine Infusion 0.05 MICROgram(s)/kG/Min (6.38 mL/Hr) IV Continuous <Continuous>  pantoprazole    Tablet 40 milliGRAM(s) Oral before breakfast  prasugrel 10 milliGRAM(s) Oral daily  vancomycin  IVPB 1250 milliGRAM(s) IV Intermittent once    MEDICATIONS  (PRN):  dextrose Oral Gel 15 Gram(s) Oral once PRN Blood Glucose LESS THAN 70 milliGRAM(s)/deciliter  diazepam    Tablet 1 milliGRAM(s) Oral at bedtime PRN Anxiety/Insomnia  oxycodone    5 mG/acetaminophen 325 mG 1 Tablet(s) Oral every 6 hours PRN for severe pain      HOME MEDICATIONS:  aspirin 81 mg oral delayed release tablet (12-05)  bethanechol 25 mg oral tablet (12-05)  diazePAM 2 mg oral tablet (12-05)  DULoxetine 30 mg oral delayed release capsule (12-05)  folic acid 1 mg oral tablet (12-05)  insulin lispro 100 units/mL injectable solution (12-05)  levothyroxine 25 mcg (0.025 mg) oral tablet (12-05)  losartan 25 mg oral tablet (12-05)  methotrexate 2.5 mg oral tablet (12-05)  metoprolol succinate 25 mg oral tablet, extended release (12-05)  Multiple Vitamins oral tablet (12-05)  oxycodone-acetaminophen 5 mg-325 mg oral tablet (12-05)  pantoprazole 40 mg oral delayed release tablet (12-05)  prasugrel 10 mg oral tablet (12-05)  rosuvastatin 40 mg oral tablet (12-05)  spironolactone 25 mg oral tablet (12-05)      REVIEW OF SYSTEMS:  All other review of systems is negative unless indicated above.     PHYSICAL EXAM:  PHYSICAL EXAM:  GENERAL: NAD  HEAD:  Atraumatic, Normocephalic  NECK: Supple, No JVD  CHEST/LUNG: Clear to auscultation bilaterally; No wheeze  HEART: Regular rate and rhythm; No murmurs, rubs, or gallops  ABDOMEN: Soft, Nontender, Nondistended; Bowel sounds present  EXTREMITIES:  2+ Peripheral Pulses, No clubbing, cyanosis, or edema  SKIN: No rashes or lesions

## 2023-12-06 NOTE — CHART NOTE - NSCHARTNOTEFT_GEN_A_CORE
Transfer from ED to CCU    Approved by Dr Rocha    68-year-old male with PMH of HFrEF 15 to 20% s/p AICD, MR/TR, Type 2 DM with neuropathy, hypoglycemia, HTN, HLD, CAD s/p MI s/p CABG s/p stent, history of in-stent thrombosis while on Plavix, PAD s/p 3L LE stents, TIAGO on CPAP at night, psoriasis, right AKA, frequent UTI's, presents to ED from rehab for evaluation s/p fall 2 days ago.  History is inconsistent.  Wife at bedside states patient fell yesterday, unwitnessed and has been complaining of neck pain Since his fall.  Febrile today temp orally to 101.  Decreased appetite and p.o. intake over this time as well as generalized weakness.  Denies cough/congestion, CP, SOB, vomiting, diarrhea    In the ED: BP: 82/56, HR: 68, Temp: 102.2 , 96% on 2L, RR: 18    Labs:-> WBC: 27.06, H.9 (basline 10) MCV: 88, plt: 239, -> Lactate: 2.8 -> 1.6 -> Na: 130, K: 3.8, Cl: 94, BUN: 79, crea: 2.5 (baseline 1.1) -> Glucose 261, ALP: 118, AST/ALT: 33/34 -> beta hydroxy: <0.2 -> UA: LE large, WBC: >998, Bacteria negative    CXR: pulmonary vascular congestion    CT Head and Spine negative for acute pathology    CT chest, Abdo/pelv with IV con:  1.  No definite evidence of acute traumatic injury to the chest, abdomen or pelvis.  2.  T8 vertebral body complete sclerosis, new from prior exam.   Nonspecific though suspicious for metastatic disease if there is any known history of malignancy. Nuclear medicine bone scan can be considered for further evaluation as clinically appropriate.  3.  Trace bilateral pleural effusions, decreased from prior.    s/p 1L bolus, and cefepime and Meropenem once  Patient then started on Levophed 0.05 to improve BP and admitted to SDU for Septic Shock MRSA bacteremia, unclear source, r/o endocarditis, started on IV meropenem and vancomycin. JOHN PAUL if negative for vegetations.     On 23, in pm patient had episode of VTACH and rapid was called. Given 2 IV push of amiodoarone. Approved for upgrade to CCU. Follow below for management of other conditions    #Chronic HFrEF / s/p AICD for EF of 20%   #VHD   #H/o CAD / CABG  -hold antihypertensives  - Cont home meds    #Fall   #T8 vertebral body sclerosis   -OP Bone scan vs PET eval r/o malignancy    #MOISES on CKD III, in setting of sepsis and hypotension   Kidney function improving today  -serial Scr monitoring   -PT/ Rehab eval once improved     #H/o PAD s/p R AKA   #Dyslipidemia   #DM type II w/ Neuropathy  -c/w Aspirinr 81mg qD  - Cont prasugrel 10mg qD  -Lantus / Lispro ISS w/ F/S monitoring     #H/o RA   -c/w OP Rx    #Anemia - multifactorial, chronic disease and BAIRON  -OP work up including GI eval    #TIAGO   -CPAP   -pulse ox monitoring     Pending: f/u culture

## 2023-12-06 NOTE — CONSULT NOTE ADULT - SUBJECTIVE AND OBJECTIVE BOX
FLOWER MARISCAL  68y, Male  Allergy: Atorvastatin Calcium (Unknown)  Entresto (Swelling)  ACE inhibitors (Rash)  Bactrim (Unknown)  Plavix (Unknown)      CHIEF COMPLAINT:     HPI:  68yMale with PMH of HFrEF 15 to 20% s/p AICD, MR/TR, Type 2 DM with neuropathy, hypoglycemia, HTN, HLD, CAD s/p MI s/p CABG s/p stent, history of in-stent thrombosis while on Plavix, PAD s/p 3L LE stents, TIAGO on CPAP at night, psoriasis, right AKA, frequent UTI's, presents to ED from rehab for evaluation s/p fall 2 days ago.  History is inconsistent.  Wife at bedside states patient fell yesterday, unwitnessed and has been complaining of neck pain Since his fall.  Febrile today temp orally to 101.  Decreased appetite and p.o. intake over this time as well as generalized weakness.  Denies cough/congestion, CP, SOB, vomiting, diarrhea    In the ED: BP: 82/56, HR: 68, Temp: 102.2 , 96% on 2L, RR: 18      Infectious Diseases History:  Old Micro Data/Cultures: Blood cx 11/23, 06/23, 02/23 NGTD, Klebsiella pneumoniae ESBL on urine cx 06/05/23    FAMILY HISTORY:  Family history of heart disease (Mother)    Family history of allergies  daughter      PAST MEDICAL & SURGICAL HISTORY:  Status post percutaneous transluminal coronary angioplasty  2 stents      Atherosclerosis of coronary artery  CAD (coronary artery disease)      Osteoarthritis      HLD (hyperlipidemia)      Diabetes  on insulin pump      CHF (congestive heart failure)  EF ~ 25%      History of celiac disease      Diverticulitis      STEMI (ST elevation myocardial infarction)      Diabetic neuropathy  Hands and Feet      Anxiety and depression      Other postprocedural status  Fixation hardware in foot LEFT      Stented coronary artery  10/18 heart attack  INFERIOR WALL MI      S/P CABG x 1  2018      S/P TKR (total knee replacement), right  with infection Mrsa   per pt he was cleared from MRSA infection      Surgery, elective  right knee wound debridement      History of open reduction and internal fixation (ORIF) procedure  right hip      H/O shoulder surgery  right      AICD (automatic cardioverter/defibrillator) present  [facility] [employer]      Cholecystostomy care  drain inserted 2020 & removed 4 months ago      History of tonsillectomy      History of hip replacement, total, right      Elective surgery  plastic surgery Left shin          SOCIAL HISTORY  Social History:  Ex-smoker quit 30 years ago. No alcohol use. No illicit drug use. Wheel-chair bound at baseline, pt's wife takes care of him, transfer himself in and out of wheelchair. Pt does not have orthotic yet. (11 Jan 2023 03:35)      Recent Travel:  Other Exposures:     ROS  General: Denies rigors, nightsweats  HEENT: Denies headache, rhinorrhea, sore throat, eye pain  CV: Denies CP, palpitations  PULM: Denies wheezing, hemoptysis  GI: Denies hematemesis, hematochezia, melena  : Denies discharge, hematuria  MSK: Denies arthralgias, myalgias  SKIN: Denies rash, lesions  NEURO: Denies paresthesias, weakness  PSYCH: Denies depression, anxiety    VITALS:  T(F): 97.6, Max: 102.2 (12-05-23 @ 12:56)  HR: 114  BP: 105/57  RR: 18Vital Signs Last 24 Hrs  T(C): 36.4 (06 Dec 2023 09:14), Max: 39 (05 Dec 2023 12:56)  T(F): 97.6 (06 Dec 2023 09:14), Max: 102.2 (05 Dec 2023 12:56)  HR: 114 (06 Dec 2023 09:14) (68 - 114)  BP: 105/57 (06 Dec 2023 09:14) (81/43 - 119/56)  BP(mean): 75 (06 Dec 2023 06:15) (58 - 81)  RR: 18 (06 Dec 2023 09:14) (16 - 19)  SpO2: 100% (06 Dec 2023 09:14) (95% - 100%)    Parameters below as of 06 Dec 2023 07:49  Patient On (Oxygen Delivery Method): room air        PHYSICAL EXAM:  Gen: NAD, resting in bed  HEENT: Normocephalic, atraumatic  Neck: supple, no lymphadenopathy  CV: Regular rate & regular rhythm  Lungs: CTAB  Abdomen: Soft, BS present  Ext: Warm, well perfused  Neuro: non focal, awake  Skin: no rash, no lesions  Lines: no phlebitis    TESTS & MEASUREMENTS:                        9.8    30.12 )-----------( 132      ( 06 Dec 2023 07:02 )             29.4     12-06    134<L>  |  96<L>  |  70<HH>  ----------------------------<  309<H>  3.9   |  22  |  1.7<H>    Ca    8.7      06 Dec 2023 07:02  Mg     2.1     12-06    TPro  6.3  /  Alb  2.9<L>  /  TBili  1.2  /  DBili  x   /  AST  33  /  ALT  41  /  AlkPhos  165<H>  12-06      LIVER FUNCTIONS - ( 06 Dec 2023 07:02 )  Alb: 2.9 g/dL / Pro: 6.3 g/dL / ALK PHOS: 165 U/L / ALT: 41 U/L / AST: 33 U/L / GGT: x           Urinalysis Basic - ( 06 Dec 2023 07:02 )    Color: x / Appearance: x / SG: x / pH: x  Gluc: 309 mg/dL / Ketone: x  / Bili: x / Urobili: x   Blood: x / Protein: x / Nitrite: x   Leuk Esterase: x / RBC: x / WBC x   Sq Epi: x / Non Sq Epi: x / Bacteria: x        Culture - Blood (collected 12-05-23 @ 13:00)  Source: .Blood Blood-Peripheral  Gram Stain (12-06-23 @ 06:30):    Growth in aerobic bottle: Gram Positive Cocci in Clusters    Growth in anaerobic bottle: Gram Positive Cocci in Clusters  Preliminary Report (12-06-23 @ 06:31):    Growth in aerobic bottle: Gram Positive Cocci in Clusters    Growth in anaerobic bottle: Gram Positive Cocci in Clusters    Direct identification is available within approximately 3-5    hours either by Blood Panel Multiplexed PCR or Direct    MALDI-TOF. Details: [email or web address]  Organism: Blood Culture PCR (12-06-23 @ 07:23)  Organism: Blood Culture PCR (12-06-23 @ 07:23)      Method Type: PCR      -  Methicillin resistant Staphylococcus aureus (MRSA): Detec        Lactate, Blood: 2.4 mmol/L (12-06-23 @ 07:02)  Lactate, Blood: 1.6 mmol/L (12-05-23 @ 19:50)  Lactate, Blood: 2.8 mmol/L (12-05-23 @ 14:10)  Lactate, Blood: 1.6 mmol/L (12-05-23 @ 13:00)      INFECTIOUS DISEASES TESTING  MRSA PCR Result.: Positive (06-06-23 @ 04:42)  HIV-1/2 Combo Result: Nonreact (01-11-23 @ 11:27)      RADIOLOGY & ADDITIONAL TESTS:  I have personally reviewed the last available Chest xray  CXR  Xray Chest 1 View AP/PA:   ACC: [id number] EXAM:  XR CHEST 1 VIEW   ORDERED BY: DAGOBERTO OVER     PROCEDURE DATE:  12/05/2023          INTERPRETATION:  Clinical History / Reason for exam: Trauma.    Comparison : Chest radiograph 6/16/2023.    Technique/Positioning: Single frontal chest x-ray obtained.    Findings:    Support devices: Left ICD.    Cardiac/mediastinum/hilum: Unchanged enlarged cardiac silhouette.    Lung parenchyma/Pleura: Pulmonary vascular congestion.    Skeleton/soft tissues: Unchanged.    Impression:    Pulmonary vascular congestion.    --- End of Report ---            MARIE CHASE MD; Attending Radiologist  This document has been electronically signed. Dec  5 2023  1:26PM (12-05-23 @ 13:06)      CT  CT Chest w/ IV Cont:   ACC: [id number] EXAM:  CT ABDOMEN AND PELVIS IC   ORDERED BY: DAGOBERTO   OVER     ACC: [id number] EXAM:  CT CHEST IC   ORDERED BY: DAGOBERTO OVER     PROCEDURE DATE:  12/05/2023          INTERPRETATION:  CLINICAL STATEMENT: Mechanical fall 2 days prior. No   acute symptomatology    TECHNIQUE: Contiguous axial CT images were obtained from the thoracic   inlet to the pubic symphysis following administration of intravenous   contrast.  97 cc administered of Omnipaque 350 (accession [id number]), IV   contrast documented in unlinked concurrent exam (accession [id number]) (3   cc discarded).  Oral contrast was not administered.  Reformatted images   in the coronal and sagittal planes, as well as MIP reconstructed images,   were acquired.      COMPARISONCT: 5/18/2023    OTHER STUDIES USED FOR CORRELATION: None.      FINDINGS:    CHEST:    LUNGS/PLEURA/AIRWAYS: Trace bilateral pleural effusions with adjacent   compressive atelectasis. No pneumothorax. No acute lobar consolidation.    MEDIASTINUM/THORACIC NODES: No lymphadenopathy identified.    HEART/GREAT VESSELS: Coronary artery calcifications. Aortic   calcifications. Partially imaged pacing wires.      ABDOMEN/PELVIS:    HEPATOBILIARY: No focal liver abnormality. Surgically absent gallbladder.    SPLEEN: Unremarkable.    PANCREAS: Unremarkable.    ADRENAL GLANDS: Unremarkable.    KIDNEYS: No hydronephrosis. Symmetric renal enhancement. Stable left   pelviectasis. Stable left renal lower pole nonobstructing calculus    ABDOMINOPELVIC NODES: No definite lymphadenopathy.    PELVIC ORGANS: Calcified vas deferens likely sequelae of diabetes.    PERITONEUM/MESENTERY/BOWEL: No bowel obstruction, free fluid or free air.    BONES/SOFT TISSUES: Diffuse osteopenia. Chronic rib fracture deformities.   Degenerative changes lumbar vertebral column with multiple. Partially   imaged right intramedullary nail fixation. T8 sclerosis, new from prior   exam      IMPRESSION:  1.  No definite evidence of acute traumatic injury to the chest, abdomen   or pelvis.  2.  T8 vertebral body complete sclerosis, new from prior exam.   Nonspecific though suspicious for metastatic disease if there is any   known history of malignancy. Nuclear medicine bone scan can be considered   for further evaluation as clinically appropriate.  3.  Trace bilateral pleural effusions, decreased from prior.    --- End of Report ---            LUIS POWER MD; Attending Radiologist  This document has been electronically signed. Dec  5 2023  6:28PM (12-05-23 @ 16:24)  CT Abdomen and Pelvis w/ IV Cont:   ACC: [id number] EXAM:  CT ABDOMEN AND PELVIS IC   ORDERED BY: DAGOBERTO   OVER     ACC: [id number] EXAM:  CT CHEST IC   ORDERED BY: DAGOBERTO OVER     PROCEDURE DATE:  12/05/2023          INTERPRETATION:  CLINICAL STATEMENT: Mechanical fall 2 days prior. No   acute symptomatology    TECHNIQUE: Contiguous axial CT images were obtained from the thoracic   inlet to the pubic symphysis following administration of intravenous   contrast.  97 cc administered of Omnipaque 350 (accession [id number]), IV   contrast documented in unlinked concurrent exam (accession [id number]) (3   cc discarded).  Oral contrast was not administered.  Reformatted images   in the coronal and sagittal planes, as well as MIP reconstructed images,   were acquired.      COMPARISONCT: 5/18/2023    OTHER STUDIES USED FOR CORRELATION: None.      FINDINGS:    CHEST:    LUNGS/PLEURA/AIRWAYS: Trace bilateral pleural effusions with adjacent   compressive atelectasis. No pneumothorax. No acute lobar consolidation.    MEDIASTINUM/THORACIC NODES: No lymphadenopathy identified.    HEART/GREAT VESSELS: Coronary artery calcifications. Aortic   calcifications. Partially imaged pacing wires.      ABDOMEN/PELVIS:    HEPATOBILIARY: No focal liver abnormality. Surgically absent gallbladder.    SPLEEN: Unremarkable.    PANCREAS: Unremarkable.    ADRENAL GLANDS: Unremarkable.    KIDNEYS: No hydronephrosis. Symmetric renal enhancement. Stable left   pelviectasis. Stable left renal lower pole nonobstructing calculus    ABDOMINOPELVIC NODES: No definite lymphadenopathy.    PELVIC ORGANS: Calcified vas deferens likely sequelae of diabetes.    PERITONEUM/MESENTERY/BOWEL: No bowel obstruction, free fluid or free air.    BONES/SOFT TISSUES: Diffuse osteopenia. Chronic rib fracture deformities.   Degenerative changes lumbar vertebral column with multiple. Partially   imaged right intramedullary nail fixation. T8 sclerosis, new from prior   exam      IMPRESSION:  1.  No definite evidence of acute traumatic injury to the chest, abdomen   or pelvis.  2.  T8 vertebral body complete sclerosis, new from prior exam.   Nonspecific though suspicious for metastatic disease if there is any   known history of malignancy. Nuclear medicine bone scan can be considered   for further evaluation as clinically appropriate.  3.  Trace bilateral pleural effusions, decreased from prior.    --- End of Report ---            LUIS POWER MD; Attending Radiologist  This document has been electronically signed. Dec  5 2023  6:28PM (12-05-23 @ 16:24)      CARDIOLOGY TESTING      All available historical records have been reviewed    MEDICATIONS  aspirin enteric coated 81  bethanechol 25  dextrose 5%. 1000  dextrose 5%. 1000  dextrose 50% Injectable 12.5  dextrose 50% Injectable 25  dextrose 50% Injectable 25  DULoxetine 30  folic acid 1  glucagon  Injectable 1  heparin   Injectable 5000  insulin glargine Injectable (LANTUS) 8  insulin lispro (ADMELOG) corrective regimen sliding scale   levothyroxine 25  meropenem  IVPB 1000  norepinephrine Infusion 0.05  pantoprazole    Tablet 40  prasugrel 10  vancomycin  IVPB 1250      ANTIBIOTICS:  meropenem  IVPB 1000 milliGRAM(s) IV Intermittent every 12 hours  vancomycin  IVPB 1250 milliGRAM(s) IV Intermittent once      All available historical data has been reviewed   FLOWER MARISCAL  68y, Male  Allergy: Atorvastatin Calcium (Unknown)  Entresto (Swelling)  ACE inhibitors (Rash)  Bactrim (Unknown)  Plavix (Unknown)      CHIEF COMPLAINT:     HPI:  68yMale with PMH of HFrEF 15 to 20% s/p AICD, MR/TR, Type 2 DM with neuropathy, hypoglycemia, HTN, HLD, CAD s/p MI s/p CABG s/p stent, history of in-stent thrombosis while on Plavix, PAD s/p 3L LE stents, TIAGO on CPAP at night, psoriasis, right AKA, frequent UTI's, presents to ED from rehab for evaluation s/p fall 2 days ago.  History is inconsistent.  Wife at bedside states patient fell yesterday, unwitnessed and has been complaining of neck pain Since his fall.  Febrile today temp orally to 101.  Decreased appetite and p.o. intake over this time as well as generalized weakness.  Denies cough/congestion, CP, SOB, vomiting, diarrhea    In the ED: BP: 82/56, HR: 68, Temp: 102.2 , 96% on 2L, RR: 18      Infectious Diseases History:  Old Micro Data/Cultures: Blood cx 11/23, 06/23, 02/23 NGTD, Klebsiella pneumoniae ESBL on urine cx 06/05/23    FAMILY HISTORY:  Family history of heart disease (Mother)    Family history of allergies  daughter      PAST MEDICAL & SURGICAL HISTORY:  Status post percutaneous transluminal coronary angioplasty  2 stents      Atherosclerosis of coronary artery  CAD (coronary artery disease)      Osteoarthritis      HLD (hyperlipidemia)      Diabetes  on insulin pump      CHF (congestive heart failure)  EF ~ 25%      History of celiac disease      Diverticulitis      STEMI (ST elevation myocardial infarction)      Diabetic neuropathy  Hands and Feet      Anxiety and depression      Other postprocedural status  Fixation hardware in foot LEFT      Stented coronary artery  10/18 heart attack  INFERIOR WALL MI      S/P CABG x 1  2018      S/P TKR (total knee replacement), right  with infection Mrsa   per pt he was cleared from MRSA infection      Surgery, elective  right knee wound debridement      History of open reduction and internal fixation (ORIF) procedure  right hip      H/O shoulder surgery  right      AICD (automatic cardioverter/defibrillator) present  [employer]      Cholecystostomy care  drain inserted 2020 & removed 4 months ago      History of tonsillectomy      History of hip replacement, total, right      Elective surgery  plastic surgery Left shin          SOCIAL HISTORY  Social History:  Ex-smoker quit 30 years ago. No alcohol use. No illicit drug use. Wheel-chair bound at baseline, pt's wife takes care of him, transfer himself in and out of wheelchair. Pt does not have orthotic yet. (11 Jan 2023 03:35)      Recent Travel:  Other Exposures:     ROS  General: Denies rigors, nightsweats  HEENT: Denies headache, rhinorrhea, sore throat, eye pain  CV: Denies CP, palpitations  PULM: Denies wheezing, hemoptysis  GI: Denies hematemesis, hematochezia, melena  : Denies discharge, hematuria  MSK: Denies arthralgias, myalgias  SKIN: Denies rash, lesions  NEURO: Denies paresthesias, weakness  PSYCH: Denies depression, anxiety    VITALS:  T(F): 97.6, Max: 102.2 (12-05-23 @ 12:56)  HR: 114  BP: 105/57  RR: 18Vital Signs Last 24 Hrs  T(C): 36.4 (06 Dec 2023 09:14), Max: 39 (05 Dec 2023 12:56)  T(F): 97.6 (06 Dec 2023 09:14), Max: 102.2 (05 Dec 2023 12:56)  HR: 114 (06 Dec 2023 09:14) (68 - 114)  BP: 105/57 (06 Dec 2023 09:14) (81/43 - 119/56)  BP(mean): 75 (06 Dec 2023 06:15) (58 - 81)  RR: 18 (06 Dec 2023 09:14) (16 - 19)  SpO2: 100% (06 Dec 2023 09:14) (95% - 100%)    Parameters below as of 06 Dec 2023 07:49  Patient On (Oxygen Delivery Method): room air        PHYSICAL EXAM:  Gen: NAD, resting in bed  HEENT: Normocephalic, atraumatic  Neck: supple, no lymphadenopathy  CV: Regular rate & regular rhythm  Lungs: CTAB  Abdomen: Soft, BS present  Ext: Warm, well perfused  Neuro: non focal, awake  Skin: no rash, no lesions  Lines: no phlebitis    TESTS & MEASUREMENTS:                        9.8    30.12 )-----------( 132      ( 06 Dec 2023 07:02 )             29.4     12-06    134<L>  |  96<L>  |  70<HH>  ----------------------------<  309<H>  3.9   |  22  |  1.7<H>    Ca    8.7      06 Dec 2023 07:02  Mg     2.1     12-06    TPro  6.3  /  Alb  2.9<L>  /  TBili  1.2  /  DBili  x   /  AST  33  /  ALT  41  /  AlkPhos  165<H>  12-06      LIVER FUNCTIONS - ( 06 Dec 2023 07:02 )  Alb: 2.9 g/dL / Pro: 6.3 g/dL / ALK PHOS: 165 U/L / ALT: 41 U/L / AST: 33 U/L / GGT: x           Urinalysis Basic - ( 06 Dec 2023 07:02 )    Color: x / Appearance: x / SG: x / pH: x  Gluc: 309 mg/dL / Ketone: x  / Bili: x / Urobili: x   Blood: x / Protein: x / Nitrite: x   Leuk Esterase: x / RBC: x / WBC x   Sq Epi: x / Non Sq Epi: x / Bacteria: x        Culture - Blood (collected 12-05-23 @ 13:00)  Source: .Blood Blood-Peripheral  Gram Stain (12-06-23 @ 06:30):    Growth in aerobic bottle: Gram Positive Cocci in Clusters    Growth in anaerobic bottle: Gram Positive Cocci in Clusters  Preliminary Report (12-06-23 @ 06:31):    Growth in aerobic bottle: Gram Positive Cocci in Clusters    Growth in anaerobic bottle: Gram Positive Cocci in Clusters    Direct identification is available within approximately 3-5    hours either by Blood Panel Multiplexed PCR or Direct    MALDI-TOF. Details: [email or web address]  Organism: Blood Culture PCR (12-06-23 @ 07:23)  Organism: Blood Culture PCR (12-06-23 @ 07:23)      Method Type: PCR      -  Methicillin resistant Staphylococcus aureus (MRSA): Detec        Lactate, Blood: 2.4 mmol/L (12-06-23 @ 07:02)  Lactate, Blood: 1.6 mmol/L (12-05-23 @ 19:50)  Lactate, Blood: 2.8 mmol/L (12-05-23 @ 14:10)  Lactate, Blood: 1.6 mmol/L (12-05-23 @ 13:00)      INFECTIOUS DISEASES TESTING  MRSA PCR Result.: Positive (06-06-23 @ 04:42)  HIV-1/2 Combo Result: Nonreact (01-11-23 @ 11:27)      RADIOLOGY & ADDITIONAL TESTS:  I have personally reviewed the last available Chest xray  CXR  Xray Chest 1 View AP/PA:   ACC: [id number] EXAM:  XR CHEST 1 VIEW   ORDERED BY: DAGOBERTO OVER     PROCEDURE DATE:  12/05/2023          INTERPRETATION:  Clinical History / Reason for exam: Trauma.    Comparison : Chest radiograph 6/16/2023.    Technique/Positioning: Single frontal chest x-ray obtained.    Findings:    Support devices: Left ICD.    Cardiac/mediastinum/hilum: Unchanged enlarged cardiac silhouette.    Lung parenchyma/Pleura: Pulmonary vascular congestion.    Skeleton/soft tissues: Unchanged.    Impression:    Pulmonary vascular congestion.    --- End of Report ---            MARIE CHASE MD; Attending Radiologist  This document has been electronically signed. Dec  5 2023  1:26PM (12-05-23 @ 13:06)      CT  CT Chest w/ IV Cont:   ACC: [id number] EXAM:  CT ABDOMEN AND PELVIS IC   ORDERED BY: DAGOBERTO   OVER     ACC: [id number] EXAM:  CT CHEST IC   ORDERED BY: DAGOBERTO OVER     PROCEDURE DATE:  12/05/2023          INTERPRETATION:  CLINICAL STATEMENT: Mechanical fall 2 days prior. No   acute symptomatology    TECHNIQUE: Contiguous axial CT images were obtained from the thoracic   inlet to the pubic symphysis following administration of intravenous   contrast.  97 cc administered of Omnipaque 350 (accession [id number]), IV   contrast documented in unlinked concurrent exam (accession [id number]) (3   cc discarded).  Oral contrast was not administered.  Reformatted images   in the coronal and sagittal planes, as well as MIP reconstructed images,   were acquired.      COMPARISONCT: 5/18/2023    OTHER STUDIES USED FOR CORRELATION: None.      FINDINGS:    CHEST:    LUNGS/PLEURA/AIRWAYS: Trace bilateral pleural effusions with adjacent   compressive atelectasis. No pneumothorax. No acute lobar consolidation.    MEDIASTINUM/THORACIC NODES: No lymphadenopathy identified.    HEART/GREAT VESSELS: Coronary artery calcifications. Aortic   calcifications. Partially imaged pacing wires.      ABDOMEN/PELVIS:    HEPATOBILIARY: No focal liver abnormality. Surgically absent gallbladder.    SPLEEN: Unremarkable.    PANCREAS: Unremarkable.    ADRENAL GLANDS: Unremarkable.    KIDNEYS: No hydronephrosis. Symmetric renal enhancement. Stable left   pelviectasis. Stable left renal lower pole nonobstructing calculus    ABDOMINOPELVIC NODES: No definite lymphadenopathy.    PELVIC ORGANS: Calcified vas deferens likely sequelae of diabetes.    PERITONEUM/MESENTERY/BOWEL: No bowel obstruction, free fluid or free air.    BONES/SOFT TISSUES: Diffuse osteopenia. Chronic rib fracture deformities.   Degenerative changes lumbar vertebral column with multiple. Partially   imaged right intramedullary nail fixation. T8 sclerosis, new from prior   exam      IMPRESSION:  1.  No definite evidence of acute traumatic injury to the chest, abdomen   or pelvis.  2.  T8 vertebral body complete sclerosis, new from prior exam.   Nonspecific though suspicious for metastatic disease if there is any   known history of malignancy. Nuclear medicine bone scan can be considered   for further evaluation as clinically appropriate.  3.  Trace bilateral pleural effusions, decreased from prior.    --- End of Report ---            LUIS POWER MD; Attending Radiologist  This document has been electronically signed. Dec  5 2023  6:28PM (12-05-23 @ 16:24)  CT Abdomen and Pelvis w/ IV Cont:   ACC: [id number] EXAM:  CT ABDOMEN AND PELVIS IC   ORDERED BY: DAGOBERTO   OVER     ACC: [id number] EXAM:  CT CHEST IC   ORDERED BY: DAGOBERTO OVER     PROCEDURE DATE:  12/05/2023          INTERPRETATION:  CLINICAL STATEMENT: Mechanical fall 2 days prior. No   acute symptomatology    TECHNIQUE: Contiguous axial CT images were obtained from the thoracic   inlet to the pubic symphysis following administration of intravenous   contrast.  97 cc administered of Omnipaque 350 (accession [id number]), IV   contrast documented in unlinked concurrent exam (accession [id number]) (3   cc discarded).  Oral contrast was not administered.  Reformatted images   in the coronal and sagittal planes, as well as MIP reconstructed images,   were acquired.      COMPARISONCT: 5/18/2023    OTHER STUDIES USED FOR CORRELATION: None.      FINDINGS:    CHEST:    LUNGS/PLEURA/AIRWAYS: Trace bilateral pleural effusions with adjacent   compressive atelectasis. No pneumothorax. No acute lobar consolidation.    MEDIASTINUM/THORACIC NODES: No lymphadenopathy identified.    HEART/GREAT VESSELS: Coronary artery calcifications. Aortic   calcifications. Partially imaged pacing wires.      ABDOMEN/PELVIS:    HEPATOBILIARY: No focal liver abnormality. Surgically absent gallbladder.    SPLEEN: Unremarkable.    PANCREAS: Unremarkable.    ADRENAL GLANDS: Unremarkable.    KIDNEYS: No hydronephrosis. Symmetric renal enhancement. Stable left   pelviectasis. Stable left renal lower pole nonobstructing calculus    ABDOMINOPELVIC NODES: No definite lymphadenopathy.    PELVIC ORGANS: Calcified vas deferens likely sequelae of diabetes.    PERITONEUM/MESENTERY/BOWEL: No bowel obstruction, free fluid or free air.    BONES/SOFT TISSUES: Diffuse osteopenia. Chronic rib fracture deformities.   Degenerative changes lumbar vertebral column with multiple. Partially   imaged right intramedullary nail fixation. T8 sclerosis, new from prior   exam      IMPRESSION:  1.  No definite evidence of acute traumatic injury to the chest, abdomen   or pelvis.  2.  T8 vertebral body complete sclerosis, new from prior exam.   Nonspecific though suspicious for metastatic disease if there is any   known history of malignancy. Nuclear medicine bone scan can be considered   for further evaluation as clinically appropriate.  3.  Trace bilateral pleural effusions, decreased from prior.    --- End of Report ---            LUIS POWER MD; Attending Radiologist  This document has been electronically signed. Dec  5 2023  6:28PM (12-05-23 @ 16:24)      CARDIOLOGY TESTING      All available historical records have been reviewed    MEDICATIONS  aspirin enteric coated 81  bethanechol 25  dextrose 5%. 1000  dextrose 5%. 1000  dextrose 50% Injectable 12.5  dextrose 50% Injectable 25  dextrose 50% Injectable 25  DULoxetine 30  folic acid 1  glucagon  Injectable 1  heparin   Injectable 5000  insulin glargine Injectable (LANTUS) 8  insulin lispro (ADMELOG) corrective regimen sliding scale   levothyroxine 25  meropenem  IVPB 1000  norepinephrine Infusion 0.05  pantoprazole    Tablet 40  prasugrel 10  vancomycin  IVPB 1250      ANTIBIOTICS:  meropenem  IVPB 1000 milliGRAM(s) IV Intermittent every 12 hours  vancomycin  IVPB 1250 milliGRAM(s) IV Intermittent once      All available historical data has been reviewed   FLOWER MARISCAL  68y, Male  Allergy: Atorvastatin Calcium (Unknown)  Entresto (Swelling)  ACE inhibitors (Rash)  Bactrim (Unknown)  Plavix (Unknown)      CHIEF COMPLAINT:     HPI:  68yMale with PMH of HFrEF 15 to 20% s/p AICD, MR/TR, Type 2 DM with neuropathy, hypoglycemia, HTN, HLD, CAD s/p MI s/p CABG s/p stent, history of in-stent thrombosis while on Plavix, PAD s/p 3L LE stents, TIAGO on CPAP at night, psoriasis, right AKA, frequent UTI's, presents to ED from rehab for evaluation s/p fall 2 days ago.  History is inconsistent.  Wife at bedside states patient fell yesterday, unwitnessed and has been complaining of neck pain Since his fall.  Febrile today temp orally to 101.  Decreased appetite and p.o. intake over this time as well as generalized weakness.  Denies cough/congestion, CP, SOB, vomiting, diarrhea    In the ED: BP: 82/56, HR: 68, Temp: 102.2 , 96% on 2L, RR: 18      Infectious Diseases History:  Old Micro Data/Cultures: Blood cx 11/23, 06/23, 02/23 NGTD, Klebsiella pneumoniae ESBL on urine cx 06/05/23 01/06/22 R knee synovial fluid culture: MRSA    FAMILY HISTORY:  Family history of heart disease (Mother)    Family history of allergies  daughter      PAST MEDICAL & SURGICAL HISTORY:  Status post percutaneous transluminal coronary angioplasty  2 stents      Atherosclerosis of coronary artery  CAD (coronary artery disease)      Osteoarthritis      HLD (hyperlipidemia)      Diabetes  on insulin pump      CHF (congestive heart failure)  EF ~ 25%      History of celiac disease      Diverticulitis      STEMI (ST elevation myocardial infarction)      Diabetic neuropathy  Hands and Feet      Anxiety and depression      Other postprocedural status  Fixation hardware in foot LEFT      Stented coronary artery  10/18 heart attack  INFERIOR WALL MI      S/P CABG x 1  2018      S/P TKR (total knee replacement), right  with infection Mrsa   per pt he was cleared from MRSA infection      Surgery, elective  right knee wound debridement      History of open reduction and internal fixation (ORIF) procedure  right hip      H/O shoulder surgery  right      AICD (automatic cardioverter/defibrillator) present  [employer]      Cholecystostomy care  drain inserted 2020 & removed 4 months ago      History of tonsillectomy      History of hip replacement, total, right      Elective surgery  plastic surgery Left shin          SOCIAL HISTORY  Social History:  Ex-smoker quit 30 years ago. No alcohol use. No illicit drug use. Wheel-chair bound at baseline, pt's wife takes care of him, transfer himself in and out of wheelchair. Pt does not have orthotic yet. (11 Jan 2023 03:35)      Recent Travel: denies    ROS  General: Denies rigors, nightsweats, +fatigue  HEENT: Denies headache, rhinorrhea, eye pain, +hoarseness  CV: Denies CP, palpitations  PULM: Denies wheezing, hemoptysis  GI: Denies hematemesis, hematochezia, melena  : Denies discharge, hematuria  MSK: +Severe neck and mid-back pain  SKIN: Denies rash. +LLE ulcers  NEURO: Denies paresthesias, +weakness  PSYCH: Denies depression, anxiety    VITALS:  T(F): 97.6, Max: 102.2 (12-05-23 @ 12:56)  HR: 114  BP: 105/57  RR: 18Vital Signs Last 24 Hrs  T(C): 36.4 (06 Dec 2023 09:14), Max: 39 (05 Dec 2023 12:56)  T(F): 97.6 (06 Dec 2023 09:14), Max: 102.2 (05 Dec 2023 12:56)  HR: 114 (06 Dec 2023 09:14) (68 - 114)  BP: 105/57 (06 Dec 2023 09:14) (81/43 - 119/56)  BP(mean): 75 (06 Dec 2023 06:15) (58 - 81)  RR: 18 (06 Dec 2023 09:14) (16 - 19)  SpO2: 100% (06 Dec 2023 09:14) (95% - 100%)    Parameters below as of 06 Dec 2023 07:49  Patient On (Oxygen Delivery Method): room air        PHYSICAL EXAM:  Gen: NAD, resting in bed, appears ill  HEENT: Normocephalic, atraumatic,   Neck: no lymphadenopathy, painful on movement  CV: Regular rate & regular rhythm  Lungs: CTAB  Abdomen: Soft, BS present  Ext: Warm, well perfused. Right AKA present.  Neuro: non focal, awake  Skin: no rash, extensive chronic non-purulent non-erythematous LLE ulcers  Lines: no phlebitis    TESTS & MEASUREMENTS:                        9.8    30.12 )-----------( 132      ( 06 Dec 2023 07:02 )             29.4     12-06    134<L>  |  96<L>  |  70<HH>  ----------------------------<  309<H>  3.9   |  22  |  1.7<H>    Ca    8.7      06 Dec 2023 07:02  Mg     2.1     12-06    TPro  6.3  /  Alb  2.9<L>  /  TBili  1.2  /  DBili  x   /  AST  33  /  ALT  41  /  AlkPhos  165<H>  12-06      LIVER FUNCTIONS - ( 06 Dec 2023 07:02 )  Alb: 2.9 g/dL / Pro: 6.3 g/dL / ALK PHOS: 165 U/L / ALT: 41 U/L / AST: 33 U/L / GGT: x           Urinalysis Basic - ( 06 Dec 2023 07:02 )    Color: x / Appearance: x / SG: x / pH: x  Gluc: 309 mg/dL / Ketone: x  / Bili: x / Urobili: x   Blood: x / Protein: x / Nitrite: x   Leuk Esterase: x / RBC: x / WBC x   Sq Epi: x / Non Sq Epi: x / Bacteria: x        Culture - Blood (collected 12-05-23 @ 13:00)  Source: .Blood Blood-Peripheral  Gram Stain (12-06-23 @ 06:30):    Growth in aerobic bottle: Gram Positive Cocci in Clusters    Growth in anaerobic bottle: Gram Positive Cocci in Clusters  Preliminary Report (12-06-23 @ 06:31):    Growth in aerobic bottle: Gram Positive Cocci in Clusters    Growth in anaerobic bottle: Gram Positive Cocci in Clusters    Direct identification is available within approximately 3-5    hours either by Blood Panel Multiplexed PCR or Direct    MALDI-TOF. Details: [email or web address]  Organism: Blood Culture PCR (12-06-23 @ 07:23)  Organism: Blood Culture PCR (12-06-23 @ 07:23)      Method Type: PCR      -  Methicillin resistant Staphylococcus aureus (MRSA): Detec        Lactate, Blood: 2.4 mmol/L (12-06-23 @ 07:02)  Lactate, Blood: 1.6 mmol/L (12-05-23 @ 19:50)  Lactate, Blood: 2.8 mmol/L (12-05-23 @ 14:10)  Lactate, Blood: 1.6 mmol/L (12-05-23 @ 13:00)      INFECTIOUS DISEASES TESTING  MRSA PCR Result.: Positive (06-06-23 @ 04:42)  HIV-1/2 Combo Result: Nonreact (01-11-23 @ 11:27)      RADIOLOGY & ADDITIONAL TESTS:  I have personally reviewed the last available Chest xray  CXR  Xray Chest 1 View AP/PA:   ACC: [id number] EXAM:  XR CHEST 1 VIEW   ORDERED BY: DAGOBETRO OVER     PROCEDURE DATE:  12/05/2023          INTERPRETATION:  Clinical History / Reason for exam: Trauma.    Comparison : Chest radiograph 6/16/2023.    Technique/Positioning: Single frontal chest x-ray obtained.    Findings:    Support devices: Left ICD.    Cardiac/mediastinum/hilum: Unchanged enlarged cardiac silhouette.    Lung parenchyma/Pleura: Pulmonary vascular congestion.    Skeleton/soft tissues: Unchanged.    Impression:    Pulmonary vascular congestion.    --- End of Report ---            MARIE CHASE MD; Attending Radiologist  This document has been electronically signed. Dec  5 2023  1:26PM (12-05-23 @ 13:06)      CT  CT Chest w/ IV Cont:   ACC: [id number] EXAM:  CT ABDOMEN AND PELVIS IC   ORDERED BY: DAGOBERTO   OVER     ACC: [id number] EXAM:  CT CHEST IC   ORDERED BY: DAGOBERTO OVER     PROCEDURE DATE:  12/05/2023          INTERPRETATION:  CLINICAL STATEMENT: Mechanical fall 2 days prior. No   acute symptomatology    TECHNIQUE: Contiguous axial CT images were obtained from the thoracic   inlet to the pubic symphysis following administration of intravenous   contrast.  97 cc administered of Omnipaque 350 (accession [id number]), IV   contrast documented in unlinked concurrent exam (accession [id number]) (3   cc discarded).  Oral contrast was not administered.  Reformatted images   in the coronal and sagittal planes, as well as MIP reconstructed images,   were acquired.      COMPARISONCT: 5/18/2023    OTHER STUDIES USED FOR CORRELATION: None.      FINDINGS:    CHEST:    LUNGS/PLEURA/AIRWAYS: Trace bilateral pleural effusions with adjacent   compressive atelectasis. No pneumothorax. No acute lobar consolidation.    MEDIASTINUM/THORACIC NODES: No lymphadenopathy identified.    HEART/GREAT VESSELS: Coronary artery calcifications. Aortic   calcifications. Partially imaged pacing wires.      ABDOMEN/PELVIS:    HEPATOBILIARY: No focal liver abnormality. Surgically absent gallbladder.    SPLEEN: Unremarkable.    PANCREAS: Unremarkable.    ADRENAL GLANDS: Unremarkable.    KIDNEYS: No hydronephrosis. Symmetric renal enhancement. Stable left   pelviectasis. Stable left renal lower pole nonobstructing calculus    ABDOMINOPELVIC NODES: No definite lymphadenopathy.    PELVIC ORGANS: Calcified vas deferens likely sequelae of diabetes.    PERITONEUM/MESENTERY/BOWEL: No bowel obstruction, free fluid or free air.    BONES/SOFT TISSUES: Diffuse osteopenia. Chronic rib fracture deformities.   Degenerative changes lumbar vertebral column with multiple. Partially   imaged right intramedullary nail fixation. T8 sclerosis, new from prior   exam      IMPRESSION:  1.  No definite evidence of acute traumatic injury to the chest, abdomen   or pelvis.  2.  T8 vertebral body complete sclerosis, new from prior exam.   Nonspecific though suspicious for metastatic disease if there is any   known history of malignancy. Nuclear medicine bone scan can be considered   for further evaluation as clinically appropriate.  3.  Trace bilateral pleural effusions, decreased from prior.    --- End of Report ---            LUIS POWER MD; Attending Radiologist  This document has been electronically signed. Dec  5 2023  6:28PM (12-05-23 @ 16:24)  CT Abdomen and Pelvis w/ IV Cont:   ACC: [id number] EXAM:  CT ABDOMEN AND PELVIS IC   ORDERED BY: DAGOBERTO   OVER     ACC: [id number] EXAM:  CT CHEST IC   ORDERED BY: DAGBOERTO OVER     PROCEDURE DATE:  12/05/2023          INTERPRETATION:  CLINICAL STATEMENT: Mechanical fall 2 days prior. No   acute symptomatology    TECHNIQUE: Contiguous axial CT images were obtained from the thoracic   inlet to the pubic symphysis following administration of intravenous   contrast.  97 cc administered of Omnipaque 350 (accession [id number]), IV   contrast documented in unlinked concurrent exam (accession [id number]) (3   cc discarded).  Oral contrast was not administered.  Reformatted images   in the coronal and sagittal planes, as well as MIP reconstructed images,   were acquired.      COMPARISONCT: 5/18/2023    OTHER STUDIES USED FOR CORRELATION: None.      FINDINGS:    CHEST:    LUNGS/PLEURA/AIRWAYS: Trace bilateral pleural effusions with adjacent   compressive atelectasis. No pneumothorax. No acute lobar consolidation.    MEDIASTINUM/THORACIC NODES: No lymphadenopathy identified.    HEART/GREAT VESSELS: Coronary artery calcifications. Aortic   calcifications. Partially imaged pacing wires.      ABDOMEN/PELVIS:    HEPATOBILIARY: No focal liver abnormality. Surgically absent gallbladder.    SPLEEN: Unremarkable.    PANCREAS: Unremarkable.    ADRENAL GLANDS: Unremarkable.    KIDNEYS: No hydronephrosis. Symmetric renal enhancement. Stable left   pelviectasis. Stable left renal lower pole nonobstructing calculus    ABDOMINOPELVIC NODES: No definite lymphadenopathy.    PELVIC ORGANS: Calcified vas deferens likely sequelae of diabetes.    PERITONEUM/MESENTERY/BOWEL: No bowel obstruction, free fluid or free air.    BONES/SOFT TISSUES: Diffuse osteopenia. Chronic rib fracture deformities.   Degenerative changes lumbar vertebral column with multiple. Partially   imaged right intramedullary nail fixation. T8 sclerosis, new from prior   exam      IMPRESSION:  1.  No definite evidence of acute traumatic injury to the chest, abdomen   or pelvis.  2.  T8 vertebral body complete sclerosis, new from prior exam.   Nonspecific though suspicious for metastatic disease if there is any   known history of malignancy. Nuclear medicine bone scan can be considered   for further evaluation as clinically appropriate.  3.  Trace bilateral pleural effusions, decreased from prior.    --- End of Report ---            LUIS POWER MD; Attending Radiologist  This document has been electronically signed. Dec  5 2023  6:28PM (12-05-23 @ 16:24)      CARDIOLOGY TESTING      All available historical records have been reviewed    MEDICATIONS  aspirin enteric coated 81  bethanechol 25  dextrose 5%. 1000  dextrose 5%. 1000  dextrose 50% Injectable 12.5  dextrose 50% Injectable 25  dextrose 50% Injectable 25  DULoxetine 30  folic acid 1  glucagon  Injectable 1  heparin   Injectable 5000  insulin glargine Injectable (LANTUS) 8  insulin lispro (ADMELOG) corrective regimen sliding scale   levothyroxine 25  meropenem  IVPB 1000  norepinephrine Infusion 0.05  pantoprazole    Tablet 40  prasugrel 10  vancomycin  IVPB 1250      ANTIBIOTICS:  meropenem  IVPB 1000 milliGRAM(s) IV Intermittent every 12 hours  vancomycin  IVPB 1250 milliGRAM(s) IV Intermittent once      All available historical data has been reviewed   FLOWER MARISCAL  68y, Male  Allergy: Atorvastatin Calcium (Unknown)  Entresto (Swelling)  ACE inhibitors (Rash)  Bactrim (Unknown)  Plavix (Unknown)      CHIEF COMPLAINT:     HPI:  68yMale with PMH of HFrEF 15 to 20% s/p AICD, MR/TR, Type 2 DM with neuropathy, hypoglycemia, HTN, HLD, CAD s/p MI s/p CABG s/p stent, history of in-stent thrombosis while on Plavix, PAD s/p 3L LE stents, TIAGO on CPAP at night, psoriasis, right AKA, frequent UTI's, presents to ED from rehab for evaluation s/p fall 2 days ago.  History is inconsistent.  Wife at bedside states patient fell yesterday, unwitnessed and has been complaining of neck pain Since his fall.  Febrile today temp orally to 101.  Decreased appetite and p.o. intake over this time as well as generalized weakness.  Denies cough/congestion, CP, SOB, vomiting, diarrhea    In the ED: BP: 82/56, HR: 68, Temp: 102.2 , 96% on 2L, RR: 18      Infectious Diseases History:  Old Micro Data/Cultures: Blood cx 11/23, 06/23, 02/23 NGTD, Klebsiella pneumoniae ESBL on urine cx 06/05/23 01/06/22 R knee synovial fluid culture: MRSA    FAMILY HISTORY:  Family history of heart disease (Mother)    Family history of allergies  daughter      PAST MEDICAL & SURGICAL HISTORY:  Status post percutaneous transluminal coronary angioplasty  2 stents      Atherosclerosis of coronary artery  CAD (coronary artery disease)      Osteoarthritis      HLD (hyperlipidemia)      Diabetes  on insulin pump      CHF (congestive heart failure)  EF ~ 25%      History of celiac disease      Diverticulitis      STEMI (ST elevation myocardial infarction)      Diabetic neuropathy  Hands and Feet      Anxiety and depression      Other postprocedural status  Fixation hardware in foot LEFT      Stented coronary artery  10/18 heart attack  INFERIOR WALL MI      S/P CABG x 1  2018      S/P TKR (total knee replacement), right  with infection Mrsa   per pt he was cleared from MRSA infection      Surgery, elective  right knee wound debridement      History of open reduction and internal fixation (ORIF) procedure  right hip      H/O shoulder surgery  right      AICD (automatic cardioverter/defibrillator) present  [employer]      Cholecystostomy care  drain inserted 2020 & removed 4 months ago      History of tonsillectomy      History of hip replacement, total, right      Elective surgery  plastic surgery Left shin          SOCIAL HISTORY  Social History:  Ex-smoker quit 30 years ago. No alcohol use. No illicit drug use. Wheel-chair bound at baseline, pt's wife takes care of him, transfer himself in and out of wheelchair. Pt does not have orthotic yet. (11 Jan 2023 03:35)      Recent Travel: denies    ROS  General: Denies rigors, nightsweats, +fatigue  HEENT: Denies headache, rhinorrhea, eye pain, +hoarseness  CV: Denies CP, palpitations  PULM: Denies wheezing, hemoptysis  GI: Denies hematemesis, hematochezia, melena  : Denies discharge, hematuria  MSK: +Severe neck and mid-back pain  SKIN: Denies rash. +LLE ulcers  NEURO: Denies paresthesias, +weakness  PSYCH: Denies depression, anxiety    VITALS:  T(F): 97.6, Max: 102.2 (12-05-23 @ 12:56)  HR: 114  BP: 105/57  RR: 18Vital Signs Last 24 Hrs  T(C): 36.4 (06 Dec 2023 09:14), Max: 39 (05 Dec 2023 12:56)  T(F): 97.6 (06 Dec 2023 09:14), Max: 102.2 (05 Dec 2023 12:56)  HR: 114 (06 Dec 2023 09:14) (68 - 114)  BP: 105/57 (06 Dec 2023 09:14) (81/43 - 119/56)  BP(mean): 75 (06 Dec 2023 06:15) (58 - 81)  RR: 18 (06 Dec 2023 09:14) (16 - 19)  SpO2: 100% (06 Dec 2023 09:14) (95% - 100%)    Parameters below as of 06 Dec 2023 07:49  Patient On (Oxygen Delivery Method): room air        PHYSICAL EXAM:  Gen: NAD, resting in bed, appears ill  HEENT: Normocephalic, atraumatic,   Neck: no lymphadenopathy, painful on movement  CV: Regular rate & regular rhythm  Lungs: CTAB  Abdomen: Soft, BS present  Ext: Warm, well perfused. Right AKA present.  Neuro: non focal, awake  Skin: no rash, extensive chronic non-purulent non-erythematous LLE ulcers  Lines: no phlebitis    TESTS & MEASUREMENTS:                        9.8    30.12 )-----------( 132      ( 06 Dec 2023 07:02 )             29.4     12-06    134<L>  |  96<L>  |  70<HH>  ----------------------------<  309<H>  3.9   |  22  |  1.7<H>    Ca    8.7      06 Dec 2023 07:02  Mg     2.1     12-06    TPro  6.3  /  Alb  2.9<L>  /  TBili  1.2  /  DBili  x   /  AST  33  /  ALT  41  /  AlkPhos  165<H>  12-06      LIVER FUNCTIONS - ( 06 Dec 2023 07:02 )  Alb: 2.9 g/dL / Pro: 6.3 g/dL / ALK PHOS: 165 U/L / ALT: 41 U/L / AST: 33 U/L / GGT: x           Urinalysis Basic - ( 06 Dec 2023 07:02 )    Color: x / Appearance: x / SG: x / pH: x  Gluc: 309 mg/dL / Ketone: x  / Bili: x / Urobili: x   Blood: x / Protein: x / Nitrite: x   Leuk Esterase: x / RBC: x / WBC x   Sq Epi: x / Non Sq Epi: x / Bacteria: x        Culture - Blood (collected 12-05-23 @ 13:00)  Source: .Blood Blood-Peripheral  Gram Stain (12-06-23 @ 06:30):    Growth in aerobic bottle: Gram Positive Cocci in Clusters    Growth in anaerobic bottle: Gram Positive Cocci in Clusters  Preliminary Report (12-06-23 @ 06:31):    Growth in aerobic bottle: Gram Positive Cocci in Clusters    Growth in anaerobic bottle: Gram Positive Cocci in Clusters    Direct identification is available within approximately 3-5    hours either by Blood Panel Multiplexed PCR or Direct    MALDI-TOF. Details: [email or web address]  Organism: Blood Culture PCR (12-06-23 @ 07:23)  Organism: Blood Culture PCR (12-06-23 @ 07:23)      Method Type: PCR      -  Methicillin resistant Staphylococcus aureus (MRSA): Detec        Lactate, Blood: 2.4 mmol/L (12-06-23 @ 07:02)  Lactate, Blood: 1.6 mmol/L (12-05-23 @ 19:50)  Lactate, Blood: 2.8 mmol/L (12-05-23 @ 14:10)  Lactate, Blood: 1.6 mmol/L (12-05-23 @ 13:00)      INFECTIOUS DISEASES TESTING  MRSA PCR Result.: Positive (06-06-23 @ 04:42)  HIV-1/2 Combo Result: Nonreact (01-11-23 @ 11:27)      RADIOLOGY & ADDITIONAL TESTS:  I have personally reviewed the last available Chest xray  CXR  Xray Chest 1 View AP/PA:   ACC: [id number] EXAM:  XR CHEST 1 VIEW   ORDERED BY: DAGOBERTO OVER     PROCEDURE DATE:  12/05/2023          INTERPRETATION:  Clinical History / Reason for exam: Trauma.    Comparison : Chest radiograph 6/16/2023.    Technique/Positioning: Single frontal chest x-ray obtained.    Findings:    Support devices: Left ICD.    Cardiac/mediastinum/hilum: Unchanged enlarged cardiac silhouette.    Lung parenchyma/Pleura: Pulmonary vascular congestion.    Skeleton/soft tissues: Unchanged.    Impression:    Pulmonary vascular congestion.    --- End of Report ---            MARIE CHASE MD; Attending Radiologist  This document has been electronically signed. Dec  5 2023  1:26PM (12-05-23 @ 13:06)      CT  CT Chest w/ IV Cont:   ACC: [id number] EXAM:  CT ABDOMEN AND PELVIS IC   ORDERED BY: DAGOBERTO   OVER     ACC: [id number] EXAM:  CT CHEST IC   ORDERED BY: DAGOBERTO OVER     PROCEDURE DATE:  12/05/2023          INTERPRETATION:  CLINICAL STATEMENT: Mechanical fall 2 days prior. No   acute symptomatology    TECHNIQUE: Contiguous axial CT images were obtained from the thoracic   inlet to the pubic symphysis following administration of intravenous   contrast.  97 cc administered of Omnipaque 350 (accession [id number]), IV   contrast documented in unlinked concurrent exam (accession [id number]) (3   cc discarded).  Oral contrast was not administered.  Reformatted images   in the coronal and sagittal planes, as well as MIP reconstructed images,   were acquired.      COMPARISONCT: 5/18/2023    OTHER STUDIES USED FOR CORRELATION: None.      FINDINGS:    CHEST:    LUNGS/PLEURA/AIRWAYS: Trace bilateral pleural effusions with adjacent   compressive atelectasis. No pneumothorax. No acute lobar consolidation.    MEDIASTINUM/THORACIC NODES: No lymphadenopathy identified.    HEART/GREAT VESSELS: Coronary artery calcifications. Aortic   calcifications. Partially imaged pacing wires.      ABDOMEN/PELVIS:    HEPATOBILIARY: No focal liver abnormality. Surgically absent gallbladder.    SPLEEN: Unremarkable.    PANCREAS: Unremarkable.    ADRENAL GLANDS: Unremarkable.    KIDNEYS: No hydronephrosis. Symmetric renal enhancement. Stable left   pelviectasis. Stable left renal lower pole nonobstructing calculus    ABDOMINOPELVIC NODES: No definite lymphadenopathy.    PELVIC ORGANS: Calcified vas deferens likely sequelae of diabetes.    PERITONEUM/MESENTERY/BOWEL: No bowel obstruction, free fluid or free air.    BONES/SOFT TISSUES: Diffuse osteopenia. Chronic rib fracture deformities.   Degenerative changes lumbar vertebral column with multiple. Partially   imaged right intramedullary nail fixation. T8 sclerosis, new from prior   exam      IMPRESSION:  1.  No definite evidence of acute traumatic injury to the chest, abdomen   or pelvis.  2.  T8 vertebral body complete sclerosis, new from prior exam.   Nonspecific though suspicious for metastatic disease if there is any   known history of malignancy. Nuclear medicine bone scan can be considered   for further evaluation as clinically appropriate.  3.  Trace bilateral pleural effusions, decreased from prior.    --- End of Report ---            LUIS POWER MD; Attending Radiologist  This document has been electronically signed. Dec  5 2023  6:28PM (12-05-23 @ 16:24)  CT Abdomen and Pelvis w/ IV Cont:   ACC: [id number] EXAM:  CT ABDOMEN AND PELVIS IC   ORDERED BY: DAGOBERTO   OVER     ACC: [id number] EXAM:  CT CHEST IC   ORDERED BY: DAGOBERTO OVER     PROCEDURE DATE:  12/05/2023          INTERPRETATION:  CLINICAL STATEMENT: Mechanical fall 2 days prior. No   acute symptomatology    TECHNIQUE: Contiguous axial CT images were obtained from the thoracic   inlet to the pubic symphysis following administration of intravenous   contrast.  97 cc administered of Omnipaque 350 (accession [id number]), IV   contrast documented in unlinked concurrent exam (accession [id number]) (3   cc discarded).  Oral contrast was not administered.  Reformatted images   in the coronal and sagittal planes, as well as MIP reconstructed images,   were acquired.      COMPARISONCT: 5/18/2023    OTHER STUDIES USED FOR CORRELATION: None.      FINDINGS:    CHEST:    LUNGS/PLEURA/AIRWAYS: Trace bilateral pleural effusions with adjacent   compressive atelectasis. No pneumothorax. No acute lobar consolidation.    MEDIASTINUM/THORACIC NODES: No lymphadenopathy identified.    HEART/GREAT VESSELS: Coronary artery calcifications. Aortic   calcifications. Partially imaged pacing wires.      ABDOMEN/PELVIS:    HEPATOBILIARY: No focal liver abnormality. Surgically absent gallbladder.    SPLEEN: Unremarkable.    PANCREAS: Unremarkable.    ADRENAL GLANDS: Unremarkable.    KIDNEYS: No hydronephrosis. Symmetric renal enhancement. Stable left   pelviectasis. Stable left renal lower pole nonobstructing calculus    ABDOMINOPELVIC NODES: No definite lymphadenopathy.    PELVIC ORGANS: Calcified vas deferens likely sequelae of diabetes.    PERITONEUM/MESENTERY/BOWEL: No bowel obstruction, free fluid or free air.    BONES/SOFT TISSUES: Diffuse osteopenia. Chronic rib fracture deformities.   Degenerative changes lumbar vertebral column with multiple. Partially   imaged right intramedullary nail fixation. T8 sclerosis, new from prior   exam      IMPRESSION:  1.  No definite evidence of acute traumatic injury to the chest, abdomen   or pelvis.  2.  T8 vertebral body complete sclerosis, new from prior exam.   Nonspecific though suspicious for metastatic disease if there is any   known history of malignancy. Nuclear medicine bone scan can be considered   for further evaluation as clinically appropriate.  3.  Trace bilateral pleural effusions, decreased from prior.    --- End of Report ---            LUIS POWER MD; Attending Radiologist  This document has been electronically signed. Dec  5 2023  6:28PM (12-05-23 @ 16:24)      CARDIOLOGY TESTING      All available historical records have been reviewed    MEDICATIONS  aspirin enteric coated 81  bethanechol 25  dextrose 5%. 1000  dextrose 5%. 1000  dextrose 50% Injectable 12.5  dextrose 50% Injectable 25  dextrose 50% Injectable 25  DULoxetine 30  folic acid 1  glucagon  Injectable 1  heparin   Injectable 5000  insulin glargine Injectable (LANTUS) 8  insulin lispro (ADMELOG) corrective regimen sliding scale   levothyroxine 25  meropenem  IVPB 1000  norepinephrine Infusion 0.05  pantoprazole    Tablet 40  prasugrel 10  vancomycin  IVPB 1250      ANTIBIOTICS:  meropenem  IVPB 1000 milliGRAM(s) IV Intermittent every 12 hours  vancomycin  IVPB 1250 milliGRAM(s) IV Intermittent once      All available historical data has been reviewed

## 2023-12-06 NOTE — CONSULT NOTE ADULT - SUBJECTIVE AND OBJECTIVE BOX
VASCULAR SURGERY CONSULT NOTE      HPI:  68-year-old male with PMH of HFrEF 15 to 20% s/p AICD, MR/TR, Type 2 DM with neuropathy, hypoglycemia, HTN, HLD, CAD s/p MI s/p CABG s/p stent, history of in-stent thrombosis while on Plavix, PAD s/p 3L LE stents, TIAGO on CPAP at night, psoriasis, right AKA, frequent UTI's, presents to ED from rehab for evaluation s/p fall 2 days prior to admission.  History is inconsistent.  Wife at bedside states patient fell yesterday, unwitnessed and has been complaining of neck pain Since his fall.  Febrile on day of admission temp orally to 101.  Decreased appetite and p.o. intake over this time as well as generalized weakness.  Denies cough/congestion, CP, SOB, vomiting, diarrhea    Since admission patient found to be septic with active UTI, started on Levo for BP support, currently running at 0.25      PAST MEDICAL & SURGICAL HISTORY:  Status post percutaneous transluminal coronary angioplasty  2 stents  Atherosclerosis of coronary artery  CAD (coronary artery disease)  Osteoarthritis  HLD (hyperlipidemia)  Diabetes  on insulin pump  CHF (congestive heart failure)  EF ~ 25%  History of celiac disease  Diverticulitis  STEMI (ST elevation myocardial infarction)  Diabetic neuropathy  Hands and Feet  Anxiety and depression  Other postprocedural status  Fixation hardware in foot LEFT  Stented coronary artery  10/18 heart attack  INFERIOR WALL MI  S/P CABG x 1  2018  S/P TKR (total knee replacement), right  with infection Mrsa   per pt he was cleared from MRSA infection  Surgery, elective  right knee wound debridement  History of open reduction and internal fixation (ORIF) procedure  right hip  H/O shoulder surgery  right  AICD (automatic cardioverter/defibrillator) present  St Kali  Cholecystostomy care  drain inserted 2020 & removed 4 months ago  History of tonsillectomy  History of hip replacement, total, right  Elective surgery  plastic surgery Left shin  Atorvastatin Calcium (Unknown)  Entresto (Swelling)  ACE inhibitors (Rash)  Bactrim (Unknown)  Plavix (Unknown)    Home Medications:  aspirin 81 mg oral delayed release tablet: 1 tab(s) orally once a day (05 Dec 2023 22:55)  bethanechol 25 mg oral tablet: 1 tab(s) orally once a day (05 Dec 2023 22:53)  diazePAM 2 mg oral tablet: 0.5 tab(s) orally once a day (at bedtime) (05 Dec 2023 22:55)  DULoxetine 30 mg oral delayed release capsule: 1 cap(s) orally 3 times a day (05 Dec 2023 22:55)  folic acid 1 mg oral tablet: 1 tab(s) orally once a day (05 Dec 2023 22:54)  insulin lispro 100 units/mL injectable solution: if your finger stick is 150-199 please inject 2 units  if your finger stick is 200-250 please inject 4 units  if your finger stick is 251-300 please inject 6 units  if your finger stick is 301-350 please inject 8 units  if your finger stick is more than 350 please call your physician    (05 Dec 2023 22:55)  levothyroxine 25 mcg (0.025 mg) oral tablet: 1 tab(s) orally once a day (05 Dec 2023 22:50)  losartan 25 mg oral tablet: 1 tab(s) orally 2 times a day (05 Dec 2023 22:53)  methotrexate 2.5 mg oral tablet: 5 tab(s) orally every 7 days (05 Dec 2023 22:51)  metoprolol succinate 25 mg oral tablet, extended release: 1 tab(s) orally 2 times a day (05 Dec 2023 22:45)  Multiple Vitamins oral tablet: 1 tab(s) orally once a day (05 Dec 2023 22:55)  oxycodone-acetaminophen 5 mg-325 mg oral tablet: 1 tab(s) orally every 6 hours as needed for  severe pain (05 Dec 2023 22:52)  pantoprazole 40 mg oral delayed release tablet: 1 tab(s) orally once a day (before a meal) (05 Dec 2023 22:55)  prasugrel 10 mg oral tablet: 1 tab(s) orally once a day (05 Dec 2023 22:55)  rosuvastatin 40 mg oral tablet: 1 tab(s) orally once a day (05 Dec 2023 22:55)  spironolactone 25 mg oral tablet: 1 tab(s) orally once a day (05 Dec 2023 22:48)    No permtinent family history of PVD    12 point ROS otherwise normal except as stated in HPI    PHYSICAL EXAM  Vital Signs Last 24 Hrs  T(C): 36.4 (06 Dec 2023 09:14), Max: 36.8 (06 Dec 2023 07:49)  T(F): 97.6 (06 Dec 2023 09:14), Max: 98.3 (06 Dec 2023 07:49)  HR: 106 (06 Dec 2023 12:39) (89 - 114)  BP: 119/66 (06 Dec 2023 12:39) (81/43 - 119/66)  BP(mean): 86 (06 Dec 2023 12:39) (58 - 86)  RR: 18 (06 Dec 2023 12:39) (16 - 19)  SpO2: 99% (06 Dec 2023 12:39) (95% - 100%)    Parameters below as of 06 Dec 2023 12:39  Patient On (Oxygen Delivery Method): room air    	  HEENT: PERRL, EOMI	  Neck: Supple, - JVD;   Cardiovascular: Regular rate  Respiratory: Equal chest rise bilaterally, no audible wheezing	  Gastrointestinal:  Soft, Non-tender, positive BS	  Skin: Mottling of skin on R hand, purple lesions on tips of toes L foot  Extremities: Decreased range of motion LLE, L foot cool to touch, s/p R AKA  Vascular: Peripheral pulses palpable B/L UE, dopplerable DP/PT LLE    MEDICATIONS:   MEDICATIONS  (STANDING):  aspirin enteric coated 81 milliGRAM(s) Oral daily  bethanechol 25 milliGRAM(s) Oral daily  dextrose 5%. 1000 milliLiter(s) (100 mL/Hr) IV Continuous <Continuous>  dextrose 5%. 1000 milliLiter(s) (50 mL/Hr) IV Continuous <Continuous>  dextrose 50% Injectable 12.5 Gram(s) IV Push once  dextrose 50% Injectable 25 Gram(s) IV Push once  dextrose 50% Injectable 25 Gram(s) IV Push once  DULoxetine 30 milliGRAM(s) Oral <User Schedule>  folic acid 1 milliGRAM(s) Oral daily  glucagon  Injectable 1 milliGRAM(s) IntraMuscular once  heparin   Injectable 5000 Unit(s) SubCutaneous every 12 hours  insulin glargine Injectable (LANTUS) 8 Unit(s) SubCutaneous every morning  insulin lispro (ADMELOG) corrective regimen sliding scale   SubCutaneous three times a day before meals  levothyroxine 25 MICROGram(s) Oral daily  meropenem  IVPB 1000 milliGRAM(s) IV Intermittent every 12 hours  norepinephrine Infusion 0.05 MICROgram(s)/kG/Min (6.38 mL/Hr) IV Continuous <Continuous>  pantoprazole    Tablet 40 milliGRAM(s) Oral before breakfast  prasugrel 10 milliGRAM(s) Oral daily    MEDICATIONS  (PRN):  dextrose Oral Gel 15 Gram(s) Oral once PRN Blood Glucose LESS THAN 70 milliGRAM(s)/deciliter  diazepam    Tablet 1 milliGRAM(s) Oral at bedtime PRN Anxiety/Insomnia  oxycodone    5 mG/acetaminophen 325 mG 1 Tablet(s) Oral every 6 hours PRN for severe pain      LAB/STUDIES:                        9.8    30.12 )-----------( 132      ( 06 Dec 2023 07:02 )             29.4     12-06    134<L>  |  96<L>  |  70<HH>  ----------------------------<  309<H>  3.9   |  22  |  1.7<H>    Ca    8.7      06 Dec 2023 07:02  Mg     2.1     12-06    TPro  6.3  /  Alb  2.9<L>  /  TBili  1.2  /  DBili  x   /  AST  33  /  ALT  41  /  AlkPhos  165<H>  12-06    PT/INR - ( 06 Dec 2023 07:02 )   PT: 15.70 sec;   INR: 1.37 ratio         PTT - ( 06 Dec 2023 07:02 )  PTT:26.2 sec  LIVER FUNCTIONS - ( 06 Dec 2023 07:02 )  Alb: 2.9 g/dL / Pro: 6.3 g/dL / ALK PHOS: 165 U/L / ALT: 41 U/L / AST: 33 U/L / GGT: x             Urinalysis Basic - ( 06 Dec 2023 07:02 )    Color: x / Appearance: x / SG: x / pH: x  Gluc: 309 mg/dL / Ketone: x  / Bili: x / Urobili: x   Blood: x / Protein: x / Nitrite: x   Leuk Esterase: x / RBC: x / WBC x   Sq Epi: x / Non Sq Epi: x / Bacteria: x    Culture - Blood (collected 05 Dec 2023 13:00)  Source: .Blood Blood-Peripheral  Gram Stain (06 Dec 2023 06:30):    Growth in aerobic bottle: Gram Positive Cocci in Clusters    Growth in anaerobic bottle: Gram Positive Cocci in Clusters  Preliminary Report (06 Dec 2023 06:31):    Growth in aerobic bottle: Gram Positive Cocci in Clusters    Growth in anaerobic bottle: Gram Positive Cocci in Clusters    Direct identification is available within approximately 3-5    hours either by Blood Panel Multiplexed PCR or Direct    MALDI-TOF. Details: [email or web address]  Organism: Blood Culture PCR (06 Dec 2023 07:23)  Organism: Blood Culture PCR (06 Dec 2023 07:23)        IMAGING:    < from: CT Abdomen and Pelvis w/ IV Cont (12.05.23 @ 16:24) >  1.  No definite evidence of acute traumatic injury to the chest, abdomen   or pelvis.  2.  T8 vertebral body complete sclerosis, new from prior exam.   Nonspecific though suspicious for metastatic disease if there is any   known history of malignancy. Nuclear medicine bone scan can be considered   for further evaluation as clinically appropriate.  3.  Trace bilateral pleural effusions, decreased from prior.    < end of copied text >  < from: CT Chest w/ IV Cont (12.05.23 @ 16:24) >  1.  No definite evidence of acute traumatic injury to the chest, abdomen   or pelvis.  2.  T8 vertebral body complete sclerosis, new from prior exam.   Nonspecific though suspicious for metastatic disease if there is any   known history of malignancy. Nuclear medicine bone scan can be considered   for further evaluation as clinically appropriate.  3.  Trace bilateral pleural effusions, decreased from prior.    < end of copied text >  < from: CT Cervical Spine No Cont (12.05.23 @ 16:22) >  IMPRESSION:    CT HEAD:  No acute intracranial findings.    Stable mild chronic microvascular ischemic changes.    CT CERVICAL SPINE:  No acute cervical fracture or facet subluxation.    < end of copied text >  < from: Xray Pelvis AP only (12.05.23 @ 13:37) >  No evidence of acute fracture.    < end of copied text >  < from: Xray Chest 1 View AP/PA (12.05.23 @ 13:06) >  Pulmonary vascular congestion.    < end of copied text >       VASCULAR SURGERY CONSULT NOTE      HPI:  68-year-old male with PMH of HFrEF 15 to 20% s/p AICD, MR/TR, Type 2 DM with neuropathy, hypoglycemia, HTN, HLD, CAD s/p MI s/p CABG s/p stent, history of in-stent thrombosis while on Plavix, PAD s/p 3L LE stents, TIAGO on CPAP at night, psoriasis, right AKA, frequent UTI's, presents to ED from rehab for evaluation s/p fall 2 days prior to admission.  History is inconsistent.  Wife at bedside states patient fell yesterday, unwitnessed and has been complaining of neck pain Since his fall.  Febrile on day of admission temp orally to 101.  Decreased appetite and p.o. intake over this time as well as generalized weakness.  Denies cough/congestion, CP, SOB, vomiting, diarrhea    Since admission patient found to be septic with active UTI, started on Levo for BP support, currently running at 0.25      PAST MEDICAL & SURGICAL HISTORY:  Status post percutaneous transluminal coronary angioplasty  2 stents  Atherosclerosis of coronary artery  CAD (coronary artery disease)  Osteoarthritis  HLD (hyperlipidemia)  Diabetes  on insulin pump  CHF (congestive heart failure)  EF ~ 25%  History of celiac disease  Diverticulitis  STEMI (ST elevation myocardial infarction)  Diabetic neuropathy  Hands and Feet  Anxiety and depression  Other postprocedural status  Fixation hardware in foot LEFT  Stented coronary artery  10/18 heart attack  INFERIOR WALL MI  S/P CABG x 1  2018  S/P TKR (total knee replacement), right  with infection Mrsa   per pt he was cleared from MRSA infection  Surgery, elective  right knee wound debridement  History of open reduction and internal fixation (ORIF) procedure  right hip  H/O shoulder surgery  right  AICD (automatic cardioverter/defibrillator) present  St Kali  Cholecystostomy care  drain inserted 2020 & removed 4 months ago  History of tonsillectomy  History of hip replacement, total, right  Elective surgery  plastic surgery Left shin  Atorvastatin Calcium (Unknown)  Entresto (Swelling)  ACE inhibitors (Rash)  Bactrim (Unknown)  Plavix (Unknown)    Home Medications:  aspirin 81 mg oral delayed release tablet: 1 tab(s) orally once a day (05 Dec 2023 22:55)  bethanechol 25 mg oral tablet: 1 tab(s) orally once a day (05 Dec 2023 22:53)  diazePAM 2 mg oral tablet: 0.5 tab(s) orally once a day (at bedtime) (05 Dec 2023 22:55)  DULoxetine 30 mg oral delayed release capsule: 1 cap(s) orally 3 times a day (05 Dec 2023 22:55)  folic acid 1 mg oral tablet: 1 tab(s) orally once a day (05 Dec 2023 22:54)  insulin lispro 100 units/mL injectable solution: if your finger stick is 150-199 please inject 2 units  if your finger stick is 200-250 please inject 4 units  if your finger stick is 251-300 please inject 6 units  if your finger stick is 301-350 please inject 8 units  if your finger stick is more than 350 please call your physician    (05 Dec 2023 22:55)  levothyroxine 25 mcg (0.025 mg) oral tablet: 1 tab(s) orally once a day (05 Dec 2023 22:50)  losartan 25 mg oral tablet: 1 tab(s) orally 2 times a day (05 Dec 2023 22:53)  methotrexate 2.5 mg oral tablet: 5 tab(s) orally every 7 days (05 Dec 2023 22:51)  metoprolol succinate 25 mg oral tablet, extended release: 1 tab(s) orally 2 times a day (05 Dec 2023 22:45)  Multiple Vitamins oral tablet: 1 tab(s) orally once a day (05 Dec 2023 22:55)  oxycodone-acetaminophen 5 mg-325 mg oral tablet: 1 tab(s) orally every 6 hours as needed for  severe pain (05 Dec 2023 22:52)  pantoprazole 40 mg oral delayed release tablet: 1 tab(s) orally once a day (before a meal) (05 Dec 2023 22:55)  prasugrel 10 mg oral tablet: 1 tab(s) orally once a day (05 Dec 2023 22:55)  rosuvastatin 40 mg oral tablet: 1 tab(s) orally once a day (05 Dec 2023 22:55)  spironolactone 25 mg oral tablet: 1 tab(s) orally once a day (05 Dec 2023 22:48)    No permtinent family history of PVD    12 point ROS otherwise normal except as stated in HPI    PHYSICAL EXAM  Vital Signs Last 24 Hrs  T(C): 36.4 (06 Dec 2023 09:14), Max: 36.8 (06 Dec 2023 07:49)  T(F): 97.6 (06 Dec 2023 09:14), Max: 98.3 (06 Dec 2023 07:49)  HR: 106 (06 Dec 2023 12:39) (89 - 114)  BP: 119/66 (06 Dec 2023 12:39) (81/43 - 119/66)  BP(mean): 86 (06 Dec 2023 12:39) (58 - 86)  RR: 18 (06 Dec 2023 12:39) (16 - 19)  SpO2: 99% (06 Dec 2023 12:39) (95% - 100%)    Parameters below as of 06 Dec 2023 12:39  Patient On (Oxygen Delivery Method): room air    	  HEENT: PERRL, EOMI	  Neck: Supple, - JVD;   Cardiovascular: Regular rate  Respiratory: Equal chest rise bilaterally, no audible wheezing	  Gastrointestinal:  Soft, Non-tender, positive BS	  Skin: Mottling of skin on R hand, purple lesions on tips of toes L foot  Extremities: Decreased range of motion LLE, L foot cool to touch, s/p R AKA  Vascular: Peripheral pulses palpable B/L UE, dopplerable DP/PT LLE    MEDICATIONS:   MEDICATIONS  (STANDING):  aspirin enteric coated 81 milliGRAM(s) Oral daily  bethanechol 25 milliGRAM(s) Oral daily  dextrose 5%. 1000 milliLiter(s) (100 mL/Hr) IV Continuous <Continuous>  dextrose 5%. 1000 milliLiter(s) (50 mL/Hr) IV Continuous <Continuous>  dextrose 50% Injectable 12.5 Gram(s) IV Push once  dextrose 50% Injectable 25 Gram(s) IV Push once  dextrose 50% Injectable 25 Gram(s) IV Push once  DULoxetine 30 milliGRAM(s) Oral <User Schedule>  folic acid 1 milliGRAM(s) Oral daily  glucagon  Injectable 1 milliGRAM(s) IntraMuscular once  heparin   Injectable 5000 Unit(s) SubCutaneous every 12 hours  insulin glargine Injectable (LANTUS) 8 Unit(s) SubCutaneous every morning  insulin lispro (ADMELOG) corrective regimen sliding scale   SubCutaneous three times a day before meals  levothyroxine 25 MICROGram(s) Oral daily  meropenem  IVPB 1000 milliGRAM(s) IV Intermittent every 12 hours  norepinephrine Infusion 0.05 MICROgram(s)/kG/Min (6.38 mL/Hr) IV Continuous <Continuous>  pantoprazole    Tablet 40 milliGRAM(s) Oral before breakfast  prasugrel 10 milliGRAM(s) Oral daily    MEDICATIONS  (PRN):  dextrose Oral Gel 15 Gram(s) Oral once PRN Blood Glucose LESS THAN 70 milliGRAM(s)/deciliter  diazepam    Tablet 1 milliGRAM(s) Oral at bedtime PRN Anxiety/Insomnia  oxycodone    5 mG/acetaminophen 325 mG 1 Tablet(s) Oral every 6 hours PRN for severe pain      LAB/STUDIES:                        9.8    30.12 )-----------( 132      ( 06 Dec 2023 07:02 )             29.4     12-06    134<L>  |  96<L>  |  70<HH>  ----------------------------<  309<H>  3.9   |  22  |  1.7<H>    Ca    8.7      06 Dec 2023 07:02  Mg     2.1     12-06    TPro  6.3  /  Alb  2.9<L>  /  TBili  1.2  /  DBili  x   /  AST  33  /  ALT  41  /  AlkPhos  165<H>  12-06    PT/INR - ( 06 Dec 2023 07:02 )   PT: 15.70 sec;   INR: 1.37 ratio         PTT - ( 06 Dec 2023 07:02 )  PTT:26.2 sec  LIVER FUNCTIONS - ( 06 Dec 2023 07:02 )  Alb: 2.9 g/dL / Pro: 6.3 g/dL / ALK PHOS: 165 U/L / ALT: 41 U/L / AST: 33 U/L / GGT: x             Urinalysis Basic - ( 06 Dec 2023 07:02 )    Color: x / Appearance: x / SG: x / pH: x  Gluc: 309 mg/dL / Ketone: x  / Bili: x / Urobili: x   Blood: x / Protein: x / Nitrite: x   Leuk Esterase: x / RBC: x / WBC x   Sq Epi: x / Non Sq Epi: x / Bacteria: x    Culture - Blood (collected 05 Dec 2023 13:00)  Source: .Blood Blood-Peripheral  Gram Stain (06 Dec 2023 06:30):    Growth in aerobic bottle: Gram Positive Cocci in Clusters    Growth in anaerobic bottle: Gram Positive Cocci in Clusters  Preliminary Report (06 Dec 2023 06:31):    Growth in aerobic bottle: Gram Positive Cocci in Clusters    Growth in anaerobic bottle: Gram Positive Cocci in Clusters    Direct identification is available within approximately 3-5    hours either by Blood Panel Multiplexed PCR or Direct    MALDI-TOF. Details: [email or web address]  Organism: Blood Culture PCR (06 Dec 2023 07:23)  Organism: Blood Culture PCR (06 Dec 2023 07:23)        IMAGING:    < from: CT Abdomen and Pelvis w/ IV Cont (12.05.23 @ 16:24) >  1.  No definite evidence of acute traumatic injury to the chest, abdomen   or pelvis.  2.  T8 vertebral body complete sclerosis, new from prior exam.   Nonspecific though suspicious for metastatic disease if there is any   known history of malignancy. Nuclear medicine bone scan can be considered   for further evaluation as clinically appropriate.  3.  Trace bilateral pleural effusions, decreased from prior.    < end of copied text >  < from: CT Chest w/ IV Cont (12.05.23 @ 16:24) >  1.  No definite evidence of acute traumatic injury to the chest, abdomen   or pelvis.  2.  T8 vertebral body complete sclerosis, new from prior exam.   Nonspecific though suspicious for metastatic disease if there is any   known history of malignancy. Nuclear medicine bone scan can be considered   for further evaluation as clinically appropriate.  3.  Trace bilateral pleural effusions, decreased from prior.    < end of copied text >  < from: CT Cervical Spine No Cont (12.05.23 @ 16:22) >  IMPRESSION:    CT HEAD:  No acute intracranial findings.    Stable mild chronic microvascular ischemic changes.    CT CERVICAL SPINE:  No acute cervical fracture or facet subluxation.    < end of copied text >  < from: Xray Pelvis AP only (12.05.23 @ 13:37) >  No evidence of acute fracture.    < end of copied text >  < from: Xray Chest 1 View AP/PA (12.05.23 @ 13:06) >  Pulmonary vascular congestion.    < end of copied text >

## 2023-12-07 NOTE — CHART NOTE - NSCHARTNOTEFT_GEN_A_CORE
Patient became very lethargic, with poor respiratory effort around 23:10, hemodynamically unstable, SpO2: 77%, BP: 45/35. pulse 123, Temp: 100.6, patient went in V.tach, shocked twice, s/p IV mg 2gm and sodium bicarb push, maxed on pressors Anesthesia was called, pt was intubated.  Family was informed about the situation, agreed with DNR only (MOLST in chart). Patient became very lethargic, with poor respiratory effort around 23:10, hemodynamically unstable, SpO2: 77%, BP: 45/35. pulse 123, Temp: 100.6, patient went in V.tach, shocked twice, s/p IV magnesium 2gm and sodium bicarb push, maxed on pressors Anesthesia was called, pt was intubated.  Family was informed about the situation, agreed with DNR only (MOLST in chart). Patient became very lethargic, with poor respiratory effort around 23:10, hemodynamically unstable, SpO2: 77%, BP: 45/35. pulse 123, Temp: 100.6, patient went in V.tach, shocked twice, s/p IV magnesium 2gm and sodium bicarb push, maxed on levophed and vasopressin. was started on lidocaine drip, amiodarone drip and procainamide drip . A diagnosis of Vtach storm was established. EP service was called. Decision was made to intubate and sedate the patient.   Anesthesia was called, pt was intubated.  Family was informed about the situation, agreed with DNR only (MOLST in chart).

## 2023-12-07 NOTE — CONSULT NOTE ADULT - PROBLEM SELECTOR RECOMMENDATION 4
-continue propofol infusion at current rate  -LORazepam   Injectable 0.5 milliGRAM(s) IV Push every 30 minutes PRN anxiety/agitation

## 2023-12-07 NOTE — CHART NOTE - NSCHARTNOTEFT_GEN_A_CORE
Electrophysiology PA Note     called by the team, pt is now comfort care, family requesting ICD deactivation  BiV ICD tachy therapies deactivated

## 2023-12-07 NOTE — CONSULT NOTE ADULT - SUBJECTIVE AND OBJECTIVE BOX
CC:  fall    HPI:  68-year-old male with PMH of HFrEF 15 to 20% s/p AICD, MR/TR, Type 2 DM with neuropathy, hypoglycemia, HTN, HLD, CAD s/p MI s/p CABG s/p stent, history of in-stent thrombosis while on Plavix, PAD s/p 3L LE stents, TIAGO on CPAP at night, psoriasis, right AKA, frequent UTI's, presents to ED from rehab for evaluation s/p fall 2 days ago.  History is inconsistent.  Wife at bedside states patient fell yesterday, unwitnessed and has been complaining of neck pain Since his fall.  Febrile today temp orally to 101.  Decreased appetite and p.o. intake over this time as well as generalized weakness.  Denies cough/congestion, CP, SOB, vomiting, diarrhea    In the ED: BP: 82/56, HR: 68, Temp: 102.2 , 96% on 2L, RR: 18    Labs:  -> WBC: 27.06, H.9 (basline 10) MCV: 88, plt: 239  -> Lactate: 2.8 -> 1.6   -> Na: 130, K: 3.8, Cl: 94, BUN: 79, crea: 2.5 (baseline 1.1)  -> Glucose 261, ALP: 118, AST/ALT: 33/34  -> beta hydroxy: <0.2    -> UA: LE large, WBC: >998, Bacteria negative    CXR: pulmonary vascular congestion    CT Head and Spine negative for acute patholgy    CT chest, Abdo/pelv with IV con:  1.  No definite evidence of acute traumatic injury to the chest, abdomen or pelvis.  2.  T8 vertebral body complete sclerosis, new from prior exam.   Nonspecific though suspicious for metastatic disease if there is any known history of malignancy. Nuclear medicine bone scan can be considered for further evaluation as clinically appropriate.  3.  Trace bilateral pleural effusions, decreased from prior.      s/p 1L bolus, and cefepime and Meropenem once  Patient then started on Levophed 0.05 to improve BP and admitted to SDU (05 Dec 2023 21:16)    PERTINENT PM/SXH:   Status post percutaneous transluminal coronary angioplasty    History of surgery to heart and great vessels, presenting hazards to health    Atherosclerosis of coronary artery    Type 2 diabetes mellitus with neurological manifestations    Type 2 diabetes mellitus    HTN (hypertension)    Osteoarthritis    Chronic systolic CHF (congestive heart failure)    HLD (hyperlipidemia)    Hyperkalemia    COPD, moderate    CKD (chronic kidney disease) stage 2, GFR 60-89 ml/min    Blood clot due to device, implant, or graft    Diabetes    HTN (hypertension)    CHF (congestive heart failure)    History of celiac disease    MRSA bacteremia    Diverticulitis    STEMI (ST elevation myocardial infarction)    Diabetic neuropathy    Celiac disease    Anxiety and depression    Diabetic foot ulcer    Preoperative clearance      Other postprocedural status    History of surgery to major organs, presenting hazards to health    Stented coronary artery    S/P CABG x 1    S/P TKR (total knee replacement), right    Surgery, elective    Surgery, elective    History of open reduction and internal fixation (ORIF) procedure    H/O shoulder surgery    AICD (automatic cardioverter/defibrillator) present    Cholecystostomy care    History of tonsillectomy    History of hip replacement, total, right    Elective surgery    Elective surgery      FAMILY HISTORY:  Family history of heart disease (Mother)    Family history of allergies  daughter      ITEMS NOT CHECKED ARE NOT PRESENT    SOCIAL HISTORY:   Significant other/partner[ ]  Children[ ]  Taoist/Spirituality:  Substance hx:  [ ]   Tobacco hx:  [ ]   Alcohol hx: [ ]   Living Situation: [ ]Home  [ ]Long term care  [x ]Rehab [ ]Other  Home Services: [ ] HHA [ ] Angela RN [ ] Hospice  Occupation:  Home Opioid hx:  [ ] Y [ ] N [ x] I-Stop Reference No:     Reference #: [id number]      Practitioner Count: 1  Pharmacy Count: 2  Current Opioid Prescriptions: 1  Current Benzodiazepine Prescriptions: 0  Current Stimulant Prescriptions: 0      Patient Demographic Information (PDI)       PDI	First Name	Last Name	Birth Date	Gender	Street Address	[facility] Code  EVONNE Blas	1955	Male	[address] [place] [address]    Prescription Information      PDI Filter:    PDI	Current Rx	Drug Type	Rx Written	Rx Dispensed	Drug	Quantity	Days Supply	Prescriber Name	Prescriber WILLARD #	Payment Method	Dispenser  A	N		10/30/2023	2023	-bites (40:1) 10mg thc and 0.25mg cbd/chew rainbow	3	6	Caleb Laguna	[id number]	[facility]  A	Y	O	2023	oxycodone-acetaminophen  mg tab	120	30	Caleb Laguna	[id number]	Medicare	[id number]  A	N		10/30/2023	2023	x-bites (40:1) 10mg thc and 0.25mg cbd/chew strawberry	2	4	Jase Caleb	[id number]	[facility]  A	N		10/30/2023	2023	vireo indigo balm (1:19) 0.1mg thc : 2.71mg cbd/1.25cc	1	3	Jase Caleb	[id number]	[facility]  A	N	O	10/26/2023	10/29/2023	oxycodone-acetaminophen  mg tab	120	30	LagunaCaleb	[id number]	Medicare	[id number]  A	N	B	2023	diazepam 2 mg tablet	60	30	Roney Mckeon DO	[id number]	Medicare	[id number]  A	N	B	2023	diazepam 2 mg tablet	15	30	Kurt Caicedo	[id number]	Medicare	[id number]  A	N	O	2023	oxycodone-acetaminophen  mg tab	120	30	LagunaCaleb	[id number]	Medicare	[id number]  A	N	O	2023	oxycodone-acetaminophen  mg tab	28	7	Caleb Laguna	[id number]	Medicare	[id number]  A	N	O	2023	oxycodone-acetaminophen 5-325 mg tablet	28	7	Caleb Laguna	[id number]	Medicare	[id number]  A	N	B	2023	diazepam 2 mg tablet	15	30	Kurt Caicedo	[id number]	Medicare	[id number]  A	N	O	2023	tramadol hcl 50 mg tablet	30	30	Cedric Nelson	[id number]	Medicare	[id number]  A	N	B	2023	diazepam 2 mg tablet	15	30	Kurt Caicedo	[id number]	Medicare	[id number]     ADVANCE DIRECTIVES:     [ ] Full Code [x ] DNR  MOLST  [ ]  Living Will  [ ]   DECISION MAKER(s):  [ ] Health Care Proxy(s)  [ x] Surrogate(s)  [ ] Guardian           Name(s): Phone Number(s):  Wife      BASELINE (I)ADL(s) (prior to admission):    [place]: [ ]Total  [ ] Moderate [ ]Dependent  Palliative Performance Status Version 2:         %    [email or web address]/[email or web address]_performance_scale_[email or web address].pdf    Allergies    Atorvastatin Calcium (Unknown)  Entresto (Swelling)  ACE inhibitors (Rash)  Bactrim (Unknown)  Plavix (Unknown)    Intolerances    MEDICATIONS  (STANDING):  fentaNYL   Infusion.... 0.5 MICROgram(s)/kG/Hr (0.67 mL/Hr) IV Continuous <Continuous>  glycopyrrolate Injectable 0.2 milliGRAM(s) IV Push every 6 hours  HYDROmorphone  Injectable 0.5 milliGRAM(s) IV Push once  propofol Infusion 20 MICROgram(s)/kG/Min (8.05 mL/Hr) IV Continuous <Continuous>    MEDICATIONS  (PRN):  HYDROmorphone  Injectable 0.5 milliGRAM(s) IV Push every 15 minutes PRN pain/dyspnea  LORazepam   Injectable 0.5 milliGRAM(s) IV Push every 30 minutes PRN anxiety/agitation    PRESENT SYMPTOMS: [x ]Unable to obtain due to poor mentation   Source if other than patient:  [ ]Family   [ ]Team     Pain: [ ]yes [ ]no  QOL impact -   Location -                    Aggravating factors -  Quality -  Radiation -  Timing-  Severity (0-10 scale):  Minimal acceptable level (0-10 scale):     CPOT:  0  https://www.[facility]ccm.org/getattachment/[email or web address]-8o9a-1z1l-3804r9256r8f/Critical-Care-Pain-Observation-Tool-(CPOT)    PAIN AD Score:   http://geriatrictoolkit.[place].[facility]/cog/painad.pdf (press ctrl +  left click to view)    Dyspnea:                           [ ]None[ ]Mild [ ]Moderate [ ]Severe     Respiratory Distress Observation Scale (RDOS): 0  A score of 0 to 2 signifies little or no respiratory distress, 3 signifies mild distress, scores 4 to 6 indicate moderate distress, and scores greater than 7 signify severe distress  https://www.[facility].ca/sites/default/files/PDFS/151619-jzsabuguncx-sjalzufy-zupaotngenp-owycn.pdf    Anxiety:                             [ ]None[ ]Mild [ ]Moderate [ ]Severe   Fatigue:                             [ ]None[ ]Mild [ ]Moderate [ ]Severe   Nausea:                             [ ]None[ ]Mild [ ]Moderate [ ]Severe   Loss of appetite:              [ ]None[ ]Mild [ ]Moderate [ ]Severe   Constipation:                    [ ]None[ ]Mild [ ]Moderate [ ]Severe    Other Symptoms:  [ ]All other review of systems negative     Palliative Performance Status Version 2:         10%    http://[facility].org/files/news/palliative_performance_scale_[email or web address].pdf    PHYSICAL EXAM:  Vital Signs Last 24 Hrs  T(C): 37.1 (07 Dec 2023 08:00), Max: 38 (07 Dec 2023 01:00)  T(F): 98.8 (07 Dec 2023 08:00), Max: 100.4 (07 Dec 2023 01:00)  HR: 0 (07 Dec 2023 10:24) (0 - 195)  BP: 118/64 (06 Dec 2023 22:00) (72/47 - 129/72)  BP(mean): 86 (06 Dec 2023 22:00) (70 - 86)  RR: 12 (07 Dec 2023 10:24) (1 - 43)  SpO2: 96% (07 Dec 2023 10:15) (63% - 100%)    Parameters below as of 07 Dec 2023 08:00  Patient On (Oxygen Delivery Method): ventilator    O2 Concentration (%): 40 I&O's Summary    06 Dec 2023 07:01  -  07 Dec 2023 07:00  --------------------------------------------------------  IN: 3256.7 mL / OUT: 0 mL / NET: 3256.7 mL    07 Dec 2023 07:01  -  07 Dec 2023 13:54  --------------------------------------------------------  IN: 1181.7 mL / OUT: 0 mL / NET: 1181.7 mL        GENERAL:  [ ] No acute distress [ ]Lethargic  [x ]Unarousable  [ ]Verbal  [ ]Non-Verbal [ ]Cachexia    BEHAVIORAL/PSYCH:  [ ]Alert and Oriented x  [ ] Anxiety [ ] Delirium [ ] Agitation [x ] Calm   EYES: [ ] No scleral icterus [ ] Scleral icterus [ x] Closed  ENMT:  [ ]Dry mouth  [x ]No external oral lesions [ ] No external ear or nose lesions  CARDIOVASCULAR:  [ ]Regular [ ]Irregular [ ]Tachy [x ]Not Tachy  [ ][place] [ ] Edema [ ] No edema  PULMONARY:  [ ]Tachypnea  [ ]Audible excessive secretions [x ] No labored breathing [ ] labored breathing  GASTROINTESTINAL: [ x]Soft  [ ]Distended  [ ]Not distended [ ]Non tender [ ]Tender  MUSCULOSKELETAL: [ x]No clubbing [ ] clubbing  [ ] No cyanosis [ ] cyanosis  NEUROLOGIC: [ ]No focal deficits  [ ]Follows commands  [ x]Does not follow commands  [ ]Cognitive impairment  [ ]Dysphagia  [ ]Dysarthria  [ ]Paresis   SKIN: [ ] Jaundiced [ ] Non-jaundiced [ ]Rash [ x]No Rash [ ] Warm [ ] Dry  MISC/LINES: [x ] ET tube [ ] Trach [ ]NGT/OGT [ ]PEG [ ]Loja    LABS: reviewed by me                        8.1    44.30 )-----------( 122      ( 07 Dec 2023 05:35 )             26.1       134<L>  |  92<L>  |  68<HH>  ----------------------------<  356<H>  5.4<H>   |  8<LL>  |  2.2<H>    Ca    8.1<L>      07 Dec 2023 05:35  Phos  9.4       Mg     3.6         TPro  5.1<L>  /  Alb  2.0<L>  /  TBili  1.3<H>  /  DBili  x   /  AST  970<H>  /  ALT  545<H>  /  AlkPhos  248<H>    PT/INR - ( 06 Dec 2023 07:02 )   PT: 15.70 sec;   INR: 1.37 ratio         PTT - ( 06 Dec 2023 07:02 )  PTT:26.2 sec    Urinalysis Basic - ( 07 Dec 2023 05:35 )    Color: x / Appearance: x / SG: x / pH: x  Gluc: 356 mg/dL / Ketone: x  / Bili: x / Urobili: x   Blood: x / Protein: x / Nitrite: x   Leuk Esterase: x / RBC: x / WBC x   Sq Epi: x / Non Sq Epi: x / Bacteria: x      RADIOLOGY & ADDITIONAL STUDIES: reviewed by me    < from: Xray Chest 1 View- PORTABLE-Routine (Xray Chest 1 View- PORTABLE-Routine in AM.) (23 @ 05:52) >    Impression:  1. Unchanged bilateral opacities/pleural effusions.  2. Lines and tubes as above.    < end of copied text >      EKG: reviewed by me    < from: 12 Lead ECG (23 @ 05:25) >    Ventricular Rate 109 BPM    Atrial Rate 107 BPM    QRS Duration 110 ms    Q-T Interval 356 ms    QTC Calculation(Bazett) 479 ms    P Axis -54 degrees    R Axis 261 degrees    T Axis 69 degrees    Diagnosis Line Ventricular-paced rhythm  Abnormal ECG    < end of copied text >      PROTEIN CALORIE MALNUTRITION PRESENT: [ ]mild [ ]moderate [ ]severe [ ]underweight [ ]morbid obesity  https://www.andeal.org/vault/2440/web/files/ONC/Table_Clinical%20Characteristics%20to%20Document%20Malnutrition-White%20JV%20et%20al%347892.pdf    Height (cm): 182.9 (23 @ 20:25), 177 (23 @ 13:26), 185.4 (23 @ 13:27)  Weight (kg): 67.1 (23 @ 20:25), 68 (23 @ 11:05), 65 (23 @ 13:26)  BMI (kg/m2): 20.1 (23 @ 20:25), 21.7 (23 @ 11:05), 20.7 (23 @ 13:26)  [ ]PPSV2 < or = to 30% [ ]significant weight loss  [ ]poor nutritional intake  [ ]anasarca      [ ]Artificial Nutrition      Palliative Care Spiritual/Emotional Screening Tool Question  Severity (0-4):                    OR                    [ x] Unable to determine. Will assess at later time if appropriate.  Score of 2 or greater indicates recommendation of Chaplaincy and/or SW referral  Chaplaincy Referral: [ ] Yes [ ] Refused [ ] Following     Caregiver [place]:  [ ] Yes [ ] No    OR    [x ] Unable to determine. Will assess at later time if appropriate.  Social Work Referral [ ]  Patient and Family Centered Care Referral [ ]    Anticipatory Grief Present: [ ] Yes [ ] No    OR     [ x] Unable to determine. Will assess at later time if appropriate.  Social Work Referral [ ]  Patient and Family Centered Care Referral [ ]    Patient discussed with primary medical team MD  Palliative care education provided to patient and/or family   CC:  fall    HPI:  68-year-old male with PMH of HFrEF 15 to 20% s/p AICD, MR/TR, Type 2 DM with neuropathy, hypoglycemia, HTN, HLD, CAD s/p MI s/p CABG s/p stent, history of in-stent thrombosis while on Plavix, PAD s/p 3L LE stents, TIAGO on CPAP at night, psoriasis, right AKA, frequent UTI's, presents to ED from rehab for evaluation s/p fall 2 days ago.  History is inconsistent.  Wife at bedside states patient fell yesterday, unwitnessed and has been complaining of neck pain Since his fall.  Febrile today temp orally to 101.  Decreased appetite and p.o. intake over this time as well as generalized weakness.  Denies cough/congestion, CP, SOB, vomiting, diarrhea    In the ED: BP: 82/56, HR: 68, Temp: 102.2 , 96% on 2L, RR: 18    Labs:  -> WBC: 27.06, H.9 (basline 10) MCV: 88, plt: 239  -> Lactate: 2.8 -> 1.6   -> Na: 130, K: 3.8, Cl: 94, BUN: 79, crea: 2.5 (baseline 1.1)  -> Glucose 261, ALP: 118, AST/ALT: 33/34  -> beta hydroxy: <0.2    -> UA: LE large, WBC: >998, Bacteria negative    CXR: pulmonary vascular congestion    CT Head and Spine negative for acute patholgy    CT chest, Abdo/pelv with IV con:  1.  No definite evidence of acute traumatic injury to the chest, abdomen or pelvis.  2.  T8 vertebral body complete sclerosis, new from prior exam.   Nonspecific though suspicious for metastatic disease if there is any known history of malignancy. Nuclear medicine bone scan can be considered for further evaluation as clinically appropriate.  3.  Trace bilateral pleural effusions, decreased from prior.      s/p 1L bolus, and cefepime and Meropenem once  Patient then started on Levophed 0.05 to improve BP and admitted to SDU (05 Dec 2023 21:16)    PERTINENT PM/SXH:   Status post percutaneous transluminal coronary angioplasty    History of surgery to heart and great vessels, presenting hazards to health    Atherosclerosis of coronary artery    Type 2 diabetes mellitus with neurological manifestations    Type 2 diabetes mellitus    HTN (hypertension)    Osteoarthritis    Chronic systolic CHF (congestive heart failure)    HLD (hyperlipidemia)    Hyperkalemia    COPD, moderate    CKD (chronic kidney disease) stage 2, GFR 60-89 ml/min    Blood clot due to device, implant, or graft    Diabetes    HTN (hypertension)    CHF (congestive heart failure)    History of celiac disease    MRSA bacteremia    Diverticulitis    STEMI (ST elevation myocardial infarction)    Diabetic neuropathy    Celiac disease    Anxiety and depression    Diabetic foot ulcer    Preoperative clearance      Other postprocedural status    History of surgery to major organs, presenting hazards to health    Stented coronary artery    S/P CABG x 1    S/P TKR (total knee replacement), right    Surgery, elective    Surgery, elective    History of open reduction and internal fixation (ORIF) procedure    H/O shoulder surgery    AICD (automatic cardioverter/defibrillator) present    Cholecystostomy care    History of tonsillectomy    History of hip replacement, total, right    Elective surgery    Elective surgery      FAMILY HISTORY:  Family history of heart disease (Mother)    Family history of allergies  daughter      ITEMS NOT CHECKED ARE NOT PRESENT    SOCIAL HISTORY:   Significant other/partner[ ]  Children[ ]  Temple/Spirituality:  Substance hx:  [ ]   Tobacco hx:  [ ]   Alcohol hx: [ ]   Living Situation: [ ]Home  [ ]Long term care  [x ]Rehab [ ]Other  Home Services: [ ] HHA [ ] Angela RN [ ] Hospice  Occupation:  Home Opioid hx:  [ ] Y [ ] N [ x] I-Stop Reference No:     Reference #: [id number]      Practitioner Count: 1  Pharmacy Count: 2  Current Opioid Prescriptions: 1  Current Benzodiazepine Prescriptions: 0  Current Stimulant Prescriptions: 0      Patient Demographic Information (PDI)       PDI	First Name	Last Name	Birth Date	Gender	Street Address	[facility] Code  EVONNE Blas	1955	Male	[address] [place] [address]    Prescription Information      PDI Filter:    PDI	Current Rx	Drug Type	Rx Written	Rx Dispensed	Drug	Quantity	Days Supply	Prescriber Name	Prescriber WILLARD #	Payment Method	Dispenser  A	N		10/30/2023	2023	-bites (40:1) 10mg thc and 0.25mg cbd/chew rainbow	3	6	Caleb Laguna	[id number]	[facility]  A	Y	O	2023	oxycodone-acetaminophen  mg tab	120	30	Caleb Laguna	[id number]	Medicare	[id number]  A	N		10/30/2023	2023	x-bites (40:1) 10mg thc and 0.25mg cbd/chew strawberry	2	4	Jase Caleb	[id number]	[facility]  A	N		10/30/2023	2023	vireo indigo balm (1:19) 0.1mg thc : 2.71mg cbd/1.25cc	1	3	Jase Caleb	[id number]	[facility]  A	N	O	10/26/2023	10/29/2023	oxycodone-acetaminophen  mg tab	120	30	LagunaCaleb	[id number]	Medicare	[id number]  A	N	B	2023	diazepam 2 mg tablet	60	30	Roney Mckeon DO	[id number]	Medicare	[id number]  A	N	B	2023	diazepam 2 mg tablet	15	30	Kurt Caicedo	[id number]	Medicare	[id number]  A	N	O	2023	oxycodone-acetaminophen  mg tab	120	30	LagunaCaleb	[id number]	Medicare	[id number]  A	N	O	2023	oxycodone-acetaminophen  mg tab	28	7	Caleb Laguna	[id number]	Medicare	[id number]  A	N	O	2023	oxycodone-acetaminophen 5-325 mg tablet	28	7	Caleb Laguna	[id number]	Medicare	[id number]  A	N	B	2023	diazepam 2 mg tablet	15	30	Kutr Caicedo	[id number]	Medicare	[id number]  A	N	O	2023	tramadol hcl 50 mg tablet	30	30	Cedric Nelson	[id number]	Medicare	[id number]  A	N	B	2023	diazepam 2 mg tablet	15	30	Kurt Caicedo	[id number]	Medicare	[id number]     ADVANCE DIRECTIVES:     [ ] Full Code [x ] DNR  MOLST  [ ]  Living Will  [ ]   DECISION MAKER(s):  [ ] Health Care Proxy(s)  [ x] Surrogate(s)  [ ] Guardian           Name(s): Phone Number(s):  Wife      BASELINE (I)ADL(s) (prior to admission):    [place]: [ ]Total  [ ] Moderate [ ]Dependent  Palliative Performance Status Version 2:         %    http://[facility].org/files/news/palliative_performance_scale_[email or web address].pdf    Allergies    Atorvastatin Calcium (Unknown)  Entresto (Swelling)  ACE inhibitors (Rash)  Bactrim (Unknown)  Plavix (Unknown)    Intolerances    MEDICATIONS  (STANDING):  fentaNYL   Infusion.... 0.5 MICROgram(s)/kG/Hr (0.67 mL/Hr) IV Continuous <Continuous>  glycopyrrolate Injectable 0.2 milliGRAM(s) IV Push every 6 hours  HYDROmorphone  Injectable 0.5 milliGRAM(s) IV Push once  propofol Infusion 20 MICROgram(s)/kG/Min (8.05 mL/Hr) IV Continuous <Continuous>    MEDICATIONS  (PRN):  HYDROmorphone  Injectable 0.5 milliGRAM(s) IV Push every 15 minutes PRN pain/dyspnea  LORazepam   Injectable 0.5 milliGRAM(s) IV Push every 30 minutes PRN anxiety/agitation    PRESENT SYMPTOMS: [x ]Unable to obtain due to poor mentation   Source if other than patient:  [ ]Family   [ ]Team     Pain: [ ]yes [ ]no  QOL impact -   Location -                    Aggravating factors -  Quality -  Radiation -  Timing-  Severity (0-10 scale):  Minimal acceptable level (0-10 scale):     CPOT:  0  https://www.sccm.org/getattachment/odt20x90-5y9l-7h3w-2o2o-5746m7661d4o/Critical-Care-Pain-Observation-Tool-(CPOT)    PAIN AD Score:   http://geriatrictoolkit.[place].[facility]/cog/painad.pdf (press ctrl +  left click to view)    Dyspnea:                           [ ]None[ ]Mild [ ]Moderate [ ]Severe     Respiratory Distress Observation Scale (RDOS): 0  A score of 0 to 2 signifies little or no respiratory distress, 3 signifies mild distress, scores 4 to 6 indicate moderate distress, and scores greater than 7 signify severe distress  https://www.[facility].ca/sites/default/files/PDFS/819370-frbgpoxmxgp-uqyobpxa-bwcrqzpqasb-gujzf.pdf    Anxiety:                             [ ]None[ ]Mild [ ]Moderate [ ]Severe   Fatigue:                             [ ]None[ ]Mild [ ]Moderate [ ]Severe   Nausea:                             [ ]None[ ]Mild [ ]Moderate [ ]Severe   Loss of appetite:              [ ]None[ ]Mild [ ]Moderate [ ]Severe   Constipation:                    [ ]None[ ]Mild [ ]Moderate [ ]Severe    Other Symptoms:  [ ]All other review of systems negative     Palliative Performance Status Version 2:         10%    http://[facility].org/files/news/palliative_performance_scale_ppsv2.pdf    PHYSICAL EXAM:  Vital Signs Last 24 Hrs  T(C): 37.1 (07 Dec 2023 08:00), Max: 38 (07 Dec 2023 01:00)  T(F): 98.8 (07 Dec 2023 08:00), Max: 100.4 (07 Dec 2023 01:00)  HR: 0 (07 Dec 2023 10:24) (0 - 195)  BP: 118/64 (06 Dec 2023 22:00) (72/47 - 129/72)  BP(mean): 86 (06 Dec 2023 22:00) (70 - 86)  RR: 12 (07 Dec 2023 10:24) (1 - 43)  SpO2: 96% (07 Dec 2023 10:15) (63% - 100%)    Parameters below as of 07 Dec 2023 08:00  Patient On (Oxygen Delivery Method): ventilator    O2 Concentration (%): 40 I&O's Summary    06 Dec 2023 07:01  -  07 Dec 2023 07:00  --------------------------------------------------------  IN: 3256.7 mL / OUT: 0 mL / NET: 3256.7 mL    07 Dec 2023 07:01  -  07 Dec 2023 13:54  --------------------------------------------------------  IN: 1181.7 mL / OUT: 0 mL / NET: 1181.7 mL        GENERAL:  [ ] No acute distress [ ]Lethargic  [x ]Unarousable  [ ]Verbal  [ ]Non-Verbal [ ]Cachexia    BEHAVIORAL/PSYCH:  [ ]Alert and Oriented x  [ ] Anxiety [ ] Delirium [ ] Agitation [x ] Calm   EYES: [ ] No scleral icterus [ ] Scleral icterus [ x] Closed  ENMT:  [ ]Dry mouth  [x ]No external oral lesions [ ] No external ear or nose lesions  CARDIOVASCULAR:  [ ]Regular [ ]Irregular [ ]Tachy [x ]Not Tachy  [ ][place]if[place] [ ] Edema [ ] No edema  PULMONARY:  [ ]Tachypnea  [ ]Audible excessive secretions [x ] No labored breathing [ ] labored breathing  GASTROINTESTINAL: [ x]Soft  [ ]Distended  [ ]Not distended [ ]Non tender [ ]Tender  MUSCULOSKELETAL: [ x]No clubbing [ ] clubbing  [ ] No cyanosis [ ] cyanosis  NEUROLOGIC: [ ]No focal deficits  [ ]Follows commands  [ x]Does not follow commands  [ ]Cognitive impairment  [ ]Dysphagia  [ ]Dysarthria  [ ]Paresis   SKIN: [ ] Jaundiced [ ] Non-jaundiced [ ]Rash [ x]No Rash [ ] Warm [ ] Dry  MISC/LINES: [x ] ET tube [ ] Trach [ ]NGT/OGT [ ]PEG [ ]Loja    LABS: reviewed by me                        8.1    44.30 )-----------( 122      ( 07 Dec 2023 05:35 )             26.1       134<L>  |  92<L>  |  68<HH>  ----------------------------<  356<H>  5.4<H>   |  8<LL>  |  2.2<H>    Ca    8.1<L>      07 Dec 2023 05:35  Phos  9.4       Mg     3.6         TPro  5.1<L>  /  Alb  2.0<L>  /  TBili  1.3<H>  /  DBili  x   /  AST  970<H>  /  ALT  545<H>  /  AlkPhos  248<H>    PT/INR - ( 06 Dec 2023 07:02 )   PT: 15.70 sec;   INR: 1.37 ratio         PTT - ( 06 Dec 2023 07:02 )  PTT:26.2 sec    Urinalysis Basic - ( 07 Dec 2023 05:35 )    Color: x / Appearance: x / SG: x / pH: x  Gluc: 356 mg/dL / Ketone: x  / Bili: x / Urobili: x   Blood: x / Protein: x / Nitrite: x   Leuk Esterase: x / RBC: x / WBC x   Sq Epi: x / Non Sq Epi: x / Bacteria: x      RADIOLOGY & ADDITIONAL STUDIES: reviewed by me    < from: Xray Chest 1 View- PORTABLE-Routine (Xray Chest 1 View- PORTABLE-Routine in AM.) (23 @ 05:52) >    Impression:  1. Unchanged bilateral opacities/pleural effusions.  2. Lines and tubes as above.    < end of copied text >      EKG: reviewed by me    < from: 12 Lead ECG (23 @ 05:25) >    Ventricular Rate 109 BPM    Atrial Rate 107 BPM    QRS Duration 110 ms    Q-T Interval 356 ms    QTC Calculation(Bazett) 479 ms    P Axis -54 degrees    R Axis 261 degrees    T Axis 69 degrees    Diagnosis Line Ventricular-paced rhythm  Abnormal ECG    < end of copied text >      PROTEIN CALORIE MALNUTRITION PRESENT: [ ]mild [ ]moderate [ ]severe [ ]underweight [ ]morbid obesity  https://www.andeal.org/vault/2440/web/files/ONC/Table_Clinical%20Characteristics%20to%20Document%20Malnutrition-White%20JV%20et%20al%432133.pdf    Height (cm): 182.9 (23 @ 20:25), 177 (23 @ 13:26), 185.4 (23 @ 13:27)  Weight (kg): 67.1 (23 @ 20:25), 68 (23 @ 11:05), 65 (23 @ 13:26)  BMI (kg/m2): 20.1 (23 @ 20:25), 21.7 (23 @ 11:05), 20.7 (23 @ 13:26)  [ ]PPSV2 < or = to 30% [ ]significant weight loss  [ ]poor nutritional intake  [ ]anasarca      [ ]Artificial Nutrition      Palliative Care Spiritual/Emotional Screening Tool Question  Severity (0-4):                    OR                    [ x] Unable to determine. Will assess at later time if appropriate.  Score of 2 or greater indicates recommendation of Chaplaincy and/or SW referral  Chaplaincy Referral: [ ] Yes [ ] Refused [ ] Following     Caregiver [place]:  [ ] Yes [ ] No    OR    [x ] Unable to determine. Will assess at later time if appropriate.  Social Work Referral [ ]  Patient and Family Centered Care Referral [ ]    Anticipatory Grief Present: [ ] Yes [ ] No    OR     [ x] Unable to determine. Will assess at later time if appropriate.  Social Work Referral [ ]  Patient and Family Centered Care Referral [ ]    Patient discussed with primary medical team MD  Palliative care education provided to patient and/or family

## 2023-12-07 NOTE — CHART NOTE - NSCHARTNOTEFT_GEN_A_CORE
Called to bedside by family    in the setting of a poor prognosis and the suffering of their family member the family has unanimously decided to forgo aggressive medical management in favor of prioritizing patient comfort.     Family is asking to have AICD deactivated so pt is no longer shocked.     Once AICD is deactivated will start withdrawing life prolonging medications.

## 2023-12-07 NOTE — CONSULT NOTE ADULT - CONVERSATION DETAILS
Met with family at bedside. Palliative care introduced. We discussed the patient's hospital course and overall grave prognosis. They confirmed that they wished for comfort measures only and no additional aggressive treatment.  We discussed removal of the ventilator in addition to initiation of comfort measures only. They wished for palliative extubation.  All questions answered.    The patient  shortly after palliative extubation.

## 2023-12-07 NOTE — DISCHARGE NOTE FOR THE EXPIRED PATIENT - HOSPITAL COURSE
68-year-old male with PMH of HFrEF 15 to 20% s/p AICD, MR/TR, Type 2 DM with neuropathy, hypoglycemia, HTN, HLD, CAD s/p MI s/p CABG s/p stent, history of in-stent thrombosis while on Plavix, PAD s/p 3L LE stents, TIAGO on CPAP at night, psoriasis, right AKA, frequent UTI's, presents to ED from rehab for evaluation s/p fall 2 days ago.  History is inconsistent.  Wife at bedside states patient fell yesterday, unwitnessed and has been complaining of neck pain Since his fall.  Febrile today temp orally to 101.  Decreased appetite and p.o. intake over this time as well as generalized weakness.  Denies cough/congestion, CP, SOB, vomiting, diarrhea    In the ED: BP: 82/56, HR: 68, Temp: 102.2 , 96% on 2L, RR: 18    Labs:  -> WBC: 27.06, H.9 (basline 10) MCV: 88, plt: 239  -> Lactate: 2.8 -> 1.6   -> Na: 130, K: 3.8, Cl: 94, BUN: 79, crea: 2.5 (baseline 1.1)  -> Glucose 261, ALP: 118, AST/ALT: 33/34  -> beta hydroxy: <0.2    -> UA: LE large, WBC: >998, Bacteria negative    CXR: pulmonary vascular congestion    CT Head and Spine negative for acute patholgy    CT chest, Abdo/pelv with IV con:  1.  No definite evidence of acute traumatic injury to the chest, abdomen or pelvis.  2.  T8 vertebral body complete sclerosis, new from prior exam.   Nonspecific though suspicious for metastatic disease if there is any known history of malignancy. Nuclear medicine bone scan can be considered for further evaluation as clinically appropriate.  3.  Trace bilateral pleural effusions, decreased from prior.      s/p 1L bolus, and cefepime and Meropenem once  Patient then started on Levophed 0.05 to improve BP and admitted to SDU      Pt began having episodes of sxs VT w/ shocks from his AICD; pt started on appropriate anti-arrhythmics; became unstable, started pressors, and patient was intubated after diagnosis of VT storm. Pt condition continued to deteriorate, requiring multiple antiarrhythmics, maxed pressors, and AICD continued shocking.     Family, at bedside, understood the poor prognosis of the situation, they decided to prioritize patient comfort over life prolonging medical care or continued medical management.     EP called to deactivate AICD, pressors were stopped.  Palliative care was brought on board. At ~10:20AM decision was made to palliatively extubate patient. Pt was extubated and passed peacefully at 10:24 w/ family at bedside.  68-year-old male with PMH of HFrEF 15 to 20% s/p AICD, MR/TR, Type 2 DM with neuropathy, hypoglycemia, HTN, HLD, CAD s/p MI s/p CABG s/p stent, history of in-stent thrombosis while on Plavix, PAD s/p 3L LE stents, TIAGO on CPAP at night, psoriasis, right AKA, frequent UTI's, presents to ED from rehab for evaluation s/p fall 2 days ago.  History is inconsistent.  Wife at bedside states patient fell yesterday, unwitnessed and has been complaining of neck pain Since his fall.  Febrile today temp orally to 101.  Decreased appetite and p.o. intake over this time as well as generalized weakness.  Denies cough/congestion, CP, SOB, vomiting, diarrhea    In the ED: BP: 82/56, HR: 68, Temp: 102.2 , 96% on 2L, RR: 18    Labs:  -> WBC: 27.06, H.9 (basline 10) MCV: 88, plt: 239  -> Lactate: 2.8 -> 1.6   -> Na: 130, K: 3.8, Cl: 94, BUN: 79, crea: 2.5 (baseline 1.1)  -> Glucose 261, ALP: 118, AST/ALT: 33/34  -> beta hydroxy: <0.2    -> UA: LE large, WBC: >998, Bacteria negative    CXR: pulmonary vascular congestion    CT Head and Spine negative for acute patholgy    CT chest, Abdo/pelv with IV con:  1.  No definite evidence of acute traumatic injury to the chest, abdomen or pelvis.  2.  T8 vertebral body complete sclerosis, new from prior exam.   Nonspecific though suspicious for metastatic disease if there is any known history of malignancy. Nuclear medicine bone scan can be considered for further evaluation as clinically appropriate.  3.  Trace bilateral pleural effusions, decreased from prior.      s/p 1L bolus, and cefepime and Meropenem once  Patient then started on Levophed 0.05 to improve BP and admitted to SDU      Pt began having episodes of sxs VT w/ shocks from his AICD; pt started on appropriate anti-arrhythmics; became unstable, started pressors, and patient was intubated after diagnosis of VT storm leading to cardiogenic shock. Pt condition continued to deteriorate, requiring multiple antiarrhythmics, maxiumum amount of pressor support and multiple attempts at ATP by ICD.    Family, at bedside, understood the poor prognosis of the situation, they decided to prioritize patient comfort over life prolonging medical care or continued medical management.     EP called to deactivate AICD, pressors were stopped.  Palliative care was brought on board. At ~10:20AM decision was made to palliatively extubate patient. Pt was extubated and passed peacefully at 10:24 w/ family at bedside.

## 2023-12-07 NOTE — PROGRESS NOTE ADULT - SUBJECTIVE AND OBJECTIVE BOX
24H events:    Patient is a 68y old Male who presents with a chief complaint of Fall, Sepsis (06 Dec 2023 16:14)    Primary diagnosis of Septic shock      Day 1:  Day 2:  Day 3:     Today is hospital day 2d. This morning patient was seen and examined at bedside, resting comfortably in bed.    No acute or major events overnight.    Code Status:    Family communication:  Contact date:  Name of person contacted:  Relationship to patient:  Communication details:  What matters most:    PAST MEDICAL & SURGICAL HISTORY  Status post percutaneous transluminal coronary angioplasty  2 stents    Atherosclerosis of coronary artery  CAD (coronary artery disease)    Osteoarthritis    HLD (hyperlipidemia)    Diabetes  on insulin pump    CHF (congestive heart failure)  EF ~ 25%    History of celiac disease    Diverticulitis    STEMI (ST elevation myocardial infarction)    Diabetic neuropathy  Hands and Feet    Anxiety and depression    Other postprocedural status  Fixation hardware in foot LEFT    Stented coronary artery  10/18 heart attack  INFERIOR WALL MI    S/P CABG x 1  2018    S/P TKR (total knee replacement), right  with infection Mrsa   per pt he was cleared from MRSA infection    Surgery, elective  right knee wound debridement    History of open reduction and internal fixation (ORIF) procedure  right hip    H/O shoulder surgery  right    AICD (automatic cardioverter/defibrillator) present  [facility]    Cholecystostomy care  drain inserted 2020 & removed 4 months ago    History of tonsillectomy    History of hip replacement, total, right    Elective surgery  plastic surgery Left shin      SOCIAL HISTORY:  Social History:      ALLERGIES:  Atorvastatin Calcium (Unknown)  Entresto (Swelling)  ACE inhibitors (Rash)  Bactrim (Unknown)  Plavix (Unknown)    MEDICATIONS:  STANDING MEDICATIONS  aMIOdarone Infusion 1 mG/Min IV Continuous <Continuous>  aMIOdarone Infusion 0.5 mG/Min IV Continuous <Continuous>  aspirin enteric coated 81 milliGRAM(s) Oral daily  bethanechol 25 milliGRAM(s) Oral daily  chlorhexidine 0.12% Liquid 15 milliLiter(s) Oral Mucosa every 12 hours  chlorhexidine 2% Cloths 1 Application(s) Topical daily  dextrose 5%. 1000 milliLiter(s) IV Continuous <Continuous>  dextrose 5%. 1000 milliLiter(s) IV Continuous <Continuous>  dextrose 50% Injectable 25 Gram(s) IV Push once  dextrose 50% Injectable 12.5 Gram(s) IV Push once  dextrose 50% Injectable 25 Gram(s) IV Push once  DULoxetine 30 milliGRAM(s) Oral <User Schedule>  fentaNYL   Infusion.... 0.5 MICROgram(s)/kG/Hr IV Continuous <Continuous>  folic acid 1 milliGRAM(s) Oral daily  glucagon  Injectable 1 milliGRAM(s) IntraMuscular once  heparin   Injectable 5000 Unit(s) SubCutaneous every 12 hours  hydrocortisone sodium succinate IVPB 100 milliGRAM(s) IV Intermittent every 8 hours  influenza  Vaccine (HIGH DOSE) 0.7 milliLiter(s) IntraMuscular once  insulin glargine Injectable (LANTUS) 25 Unit(s) SubCutaneous at bedtime  insulin lispro (ADMELOG) corrective regimen sliding scale   SubCutaneous three times a day before meals  levothyroxine 25 MICROGram(s) Oral daily  lidocaine   Infusion 1 mG/Min IV Continuous <Continuous>  milrinone Infusion 0.125 MICROgram(s)/kG/Min IV Continuous <Continuous>  norepinephrine Infusion 0.05 MICROgram(s)/kG/Min IV Continuous <Continuous>  pantoprazole    Tablet 40 milliGRAM(s) Oral before breakfast  phenylephrine    Infusion 2 MICROgram(s)/kG/Min IV Continuous <Continuous>  prasugrel 10 milliGRAM(s) Oral daily  procainamide   Infusion 2 mG/Min IV Continuous <Continuous>  propofol Infusion 20 MICROgram(s)/kG/Min IV Continuous <Continuous>  sodium bicarbonate  Infusion 0.447 mEq/kG/Hr IV Continuous <Continuous>  vancomycin  IVPB 1000 milliGRAM(s) IV Intermittent every 12 hours  vasopressin Infusion 0.04 Unit(s)/Min IV Continuous <Continuous>    PRN MEDICATIONS  dextrose Oral Gel 15 Gram(s) Oral once PRN  diazepam    Tablet 1 milliGRAM(s) Oral at bedtime PRN  oxycodone    5 mG/acetaminophen 325 mG 1 Tablet(s) Oral every 6 hours PRN    VITALS:   T(F): 98.8  HR: 105  BP: 118/64  RR: 18  SpO2: 97%    PHYSICAL EXAM:  GENERAL:   (  ) NAD, lying in bed comfortably     (  ) obtunded     (  ) lethargic     (  ) somnolent    HEAD:   (  ) Atraumatic     (  ) hematoma     (  ) laceration (specify location:       )     NECK:  (  ) Supple     (  ) neck stiffness     (  ) nuchal rigidity     (  )  no JVD     (  ) JVD present ( -- cm)    HEART:  Rate -->     (  ) normal rate     (  ) bradycardic     (  ) tachycardic  Rhythm -->     (  ) regular     (  ) regularly irregular     (  ) irregularly irregular  Murmurs -->     (  ) normal s1s2     (  ) systolic murmur     (  ) diastolic murmur     (  ) continuous murmur      (  ) S3 present     (  ) S4 present    LUNGS:   (  )Unlabored respirations     (  ) tachypnea  (  ) B/L air entry     (  ) decreased breath sounds in:  (location     )    (  ) no adventitious sound     (  ) crackles     (  ) wheezing      (  ) rhonchi      (specify location:       )  (  ) chest wall tenderness (specify location:       )    ABDOMEN:   (  ) Soft     (  ) tense   |   (  ) nondistended     (  ) distended   |   (  ) +BS     (  ) hypoactive bowel sounds     (  ) hyperactive bowel sounds  (  ) nontender     (  ) RUQ tenderness     (  ) RLQ tenderness     (  ) LLQ tenderness     (  ) epigastric tenderness     (  ) diffuse tenderness  (  ) Splenomegaly      (  ) Hepatomegaly      (  ) Jaundice     (  ) ecchymosis     EXTREMITIES:  (  ) Normal     (  ) Rash     (  ) ecchymosis     (  ) varicose veins      (  ) pitting edema     (  ) non-pitting edema   (  ) ulceration     (  ) gangrene:     (location:     )    NERVOUS SYSTEM:    (  ) A&Ox3     (  ) confused     (  ) lethargic  CN II-XII:     (  ) Intact     (  ) deficits found     (Specify:     )   Upper extremities:     (  ) no sensorimotor deficits     (  ) weakness     (  ) loss of proprioception/vibration     (  ) loss of touch/temperature (specify:    )  Lower extremities:     (  ) no sensorimotor deficits     (  ) weakness     (  ) loss of proprioception/vibration     (  ) loss of touch/temperature (specify:    )    SKIN:   (  ) No rashes or lesions     (  ) maculopapular rash     (  ) pustules     (  ) vesicles     (  ) ulcer     (  ) ecchymosis     (specify location:     )    AMPAC score:    (  ) Indwelling Loja Catheter:   Date insterted:    Reason (  ) Critical illness     (  ) urinary retention    (  ) Accurate Ins/Outs Monitoring     (  ) CMO patient    (  ) Central Line:   Date inserted:  Location: (  ) Right IJ     (  ) Left IJ     (  ) Right Fem     (  ) Left Fem    (  ) SPC        (  ) pigtail       (  ) PEG tube       (  ) colostomy       (  ) jejunostomy  (  ) U-Dall    LABS:                        8.1    44.30 )-----------( 122      ( 07 Dec 2023 05:35 )             26.1     12-07    134<L>  |  92<L>  |  68<HH>  ----------------------------<  356<H>  5.4<H>   |  8<LL>  |  2.2<H>    Ca    8.1<L>      07 Dec 2023 05:35  Phos  9.4     12-07  Mg     3.6     12-07    TPro  5.1<L>  /  Alb  2.0<L>  /  TBili  1.3<H>  /  DBili  x   /  AST  970<H>  /  ALT  545<H>  /  AlkPhos  248<H>  12-07    PT/INR - ( 06 Dec 2023 07:02 )   PT: 15.70 sec;   INR: 1.37 ratio         PTT - ( 06 Dec 2023 07:02 )  PTT:26.2 sec  Urinalysis Basic - ( 07 Dec 2023 05:35 )    Color: x / Appearance: x / SG: x / pH: x  Gluc: 356 mg/dL / Ketone: x  / Bili: x / Urobili: x   Blood: x / Protein: x / Nitrite: x   Leuk Esterase: x / RBC: x / WBC x   Sq Epi: x / Non Sq Epi: x / Bacteria: x      ABG - ( 07 Dec 2023 06:06 )  pH, Arterial: 6.99  pH, Blood: x     /  pCO2: 27    /  pO2: 181   / HCO3: 6     / Base Excess: -23.3 /  SaO2: 99.2              Lactate, Blood: 21.7 mmol/L *HH* (12-07-23 @ 06:00)  Troponin T, Serum: 1.29 ng/mL *HH* (12-07-23 @ 05:35)  Lactate, Blood: 4.2 mmol/L *HH* (12-06-23 @ 21:17)  Troponin T, Serum: 0.32 ng/mL *HH* (12-06-23 @ 18:49)      Culture - Blood (collected 05 Dec 2023 13:00)  Source: .Blood Blood-Peripheral  Gram Stain (06 Dec 2023 06:30):    Growth in aerobic bottle: Gram Positive Cocci in Clusters    Growth in anaerobic bottle: Gram Positive Cocci in Clusters  Preliminary Report (06 Dec 2023 20:49):    Growth in aerobic and anaerobic bottles: Staphylococcus aureus    Direct identification is available within approximately 3-5    hours either by Blood Panel Multiplexed PCR or Direct    MALDI-TOF. Details: [email or web address]  Organism: Blood Culture PCR (06 Dec 2023 07:23)  Organism: Blood Culture PCR (06 Dec 2023 07:23)      CARDIAC MARKERS ( 07 Dec 2023 05:35 )  x     / 1.29 ng/mL / x     / x     / x      CARDIAC MARKERS ( 06 Dec 2023 18:49 )  x     / 0.32 ng/mL / x     / x     / x          RADIOLOGY:           24H events:    Patient is a 68y old Male who presents with a chief complaint of Fall, Sepsis (06 Dec 2023 16:14)    Primary diagnosis of Septic shock      Day 1:  Day 2:  Day 3:     Today is hospital day 2d. This morning patient was seen and examined at bedside, resting comfortably in bed.    No acute or major events overnight.    Code Status:    Family communication:  Contact date:  Name of person contacted:  Relationship to patient:  Communication details:  What matters most:    PAST MEDICAL & SURGICAL HISTORY  Status post percutaneous transluminal coronary angioplasty  2 stents    Atherosclerosis of coronary artery  CAD (coronary artery disease)    Osteoarthritis    HLD (hyperlipidemia)    Diabetes  on insulin pump    CHF (congestive heart failure)  EF ~ 25%    History of celiac disease    Diverticulitis    STEMI (ST elevation myocardial infarction)    Diabetic neuropathy  Hands and Feet    Anxiety and depression    Other postprocedural status  Fixation hardware in foot LEFT    Stented coronary artery  10/18 heart attack  INFERIOR WALL MI    S/P CABG x 1  2018    S/P TKR (total knee replacement), right  with infection Mrsa   per pt he was cleared from MRSA infection    Surgery, elective  right knee wound debridement    History of open reduction and internal fixation (ORIF) procedure  right hip    H/O shoulder surgery  right    AICD (automatic cardioverter/defibrillator) present  St Kali    Cholecystostomy care  drain inserted 2020 & removed 4 months ago    History of tonsillectomy    History of hip replacement, total, right    Elective surgery  plastic surgery Left shin      SOCIAL HISTORY:  Social History:      ALLERGIES:  Atorvastatin Calcium (Unknown)  Entresto (Swelling)  ACE inhibitors (Rash)  Bactrim (Unknown)  Plavix (Unknown)    MEDICATIONS:  STANDING MEDICATIONS  aMIOdarone Infusion 1 mG/Min IV Continuous <Continuous>  aMIOdarone Infusion 0.5 mG/Min IV Continuous <Continuous>  aspirin enteric coated 81 milliGRAM(s) Oral daily  bethanechol 25 milliGRAM(s) Oral daily  chlorhexidine 0.12% Liquid 15 milliLiter(s) Oral Mucosa every 12 hours  chlorhexidine 2% Cloths 1 Application(s) Topical daily  dextrose 5%. 1000 milliLiter(s) IV Continuous <Continuous>  dextrose 5%. 1000 milliLiter(s) IV Continuous <Continuous>  dextrose 50% Injectable 25 Gram(s) IV Push once  dextrose 50% Injectable 12.5 Gram(s) IV Push once  dextrose 50% Injectable 25 Gram(s) IV Push once  DULoxetine 30 milliGRAM(s) Oral <User Schedule>  fentaNYL   Infusion.... 0.5 MICROgram(s)/kG/Hr IV Continuous <Continuous>  folic acid 1 milliGRAM(s) Oral daily  glucagon  Injectable 1 milliGRAM(s) IntraMuscular once  heparin   Injectable 5000 Unit(s) SubCutaneous every 12 hours  hydrocortisone sodium succinate IVPB 100 milliGRAM(s) IV Intermittent every 8 hours  influenza  Vaccine (HIGH DOSE) 0.7 milliLiter(s) IntraMuscular once  insulin glargine Injectable (LANTUS) 25 Unit(s) SubCutaneous at bedtime  insulin lispro (ADMELOG) corrective regimen sliding scale   SubCutaneous three times a day before meals  levothyroxine 25 MICROGram(s) Oral daily  lidocaine   Infusion 1 mG/Min IV Continuous <Continuous>  milrinone Infusion 0.125 MICROgram(s)/kG/Min IV Continuous <Continuous>  norepinephrine Infusion 0.05 MICROgram(s)/kG/Min IV Continuous <Continuous>  pantoprazole    Tablet 40 milliGRAM(s) Oral before breakfast  phenylephrine    Infusion 2 MICROgram(s)/kG/Min IV Continuous <Continuous>  prasugrel 10 milliGRAM(s) Oral daily  procainamide   Infusion 2 mG/Min IV Continuous <Continuous>  propofol Infusion 20 MICROgram(s)/kG/Min IV Continuous <Continuous>  sodium bicarbonate  Infusion 0.447 mEq/kG/Hr IV Continuous <Continuous>  vancomycin  IVPB 1000 milliGRAM(s) IV Intermittent every 12 hours  vasopressin Infusion 0.04 Unit(s)/Min IV Continuous <Continuous>    PRN MEDICATIONS  dextrose Oral Gel 15 Gram(s) Oral once PRN  diazepam    Tablet 1 milliGRAM(s) Oral at bedtime PRN  oxycodone    5 mG/acetaminophen 325 mG 1 Tablet(s) Oral every 6 hours PRN    VITALS:   T(F): 98.8  HR: 105  BP: 118/64  RR: 18  SpO2: 97%    PHYSICAL EXAM:  GENERAL:   (  ) NAD, lying in bed comfortably     (  ) obtunded     (  ) lethargic     (  ) somnolent    HEAD:   (  ) Atraumatic     (  ) hematoma     (  ) laceration (specify location:       )     NECK:  (  ) Supple     (  ) neck stiffness     (  ) nuchal rigidity     (  )  no JVD     (  ) JVD present ( -- cm)    HEART:  Rate -->     (  ) normal rate     (  ) bradycardic     (  ) tachycardic  Rhythm -->     (  ) regular     (  ) regularly irregular     (  ) irregularly irregular  Murmurs -->     (  ) normal s1s2     (  ) systolic murmur     (  ) diastolic murmur     (  ) continuous murmur      (  ) S3 present     (  ) S4 present    LUNGS:   (  )Unlabored respirations     (  ) tachypnea  (  ) B/L air entry     (  ) decreased breath sounds in:  (location     )    (  ) no adventitious sound     (  ) crackles     (  ) wheezing      (  ) rhonchi      (specify location:       )  (  ) chest wall tenderness (specify location:       )    ABDOMEN:   (  ) Soft     (  ) tense   |   (  ) nondistended     (  ) distended   |   (  ) +BS     (  ) hypoactive bowel sounds     (  ) hyperactive bowel sounds  (  ) nontender     (  ) RUQ tenderness     (  ) RLQ tenderness     (  ) LLQ tenderness     (  ) epigastric tenderness     (  ) diffuse tenderness  (  ) Splenomegaly      (  ) Hepatomegaly      (  ) Jaundice     (  ) ecchymosis     EXTREMITIES:  (  ) Normal     (  ) Rash     (  ) ecchymosis     (  ) varicose veins      (  ) pitting edema     (  ) non-pitting edema   (  ) ulceration     (  ) gangrene:     (location:     )    NERVOUS SYSTEM:    (  ) A&Ox3     (  ) confused     (  ) lethargic  CN II-XII:     (  ) Intact     (  ) deficits found     (Specify:     )   Upper extremities:     (  ) no sensorimotor deficits     (  ) weakness     (  ) loss of proprioception/vibration     (  ) loss of touch/temperature (specify:    )  Lower extremities:     (  ) no sensorimotor deficits     (  ) weakness     (  ) loss of proprioception/vibration     (  ) loss of touch/temperature (specify:    )    SKIN:   (  ) No rashes or lesions     (  ) maculopapular rash     (  ) pustules     (  ) vesicles     (  ) ulcer     (  ) ecchymosis     (specify location:     )    AMPAC score:    (  ) Indwelling Loja Catheter:   Date insterted:    Reason (  ) Critical illness     (  ) urinary retention    (  ) Accurate Ins/Outs Monitoring     (  ) CMO patient    (  ) Central Line:   Date inserted:  Location: (  ) Right IJ     (  ) Left IJ     (  ) Right Fem     (  ) Left Fem    (  ) SPC        (  ) pigtail       (  ) PEG tube       (  ) colostomy       (  ) jejunostomy  (  ) U-Dall    LABS:                        8.1    44.30 )-----------( 122      ( 07 Dec 2023 05:35 )             26.1     12-07    134<L>  |  92<L>  |  68<HH>  ----------------------------<  356<H>  5.4<H>   |  8<LL>  |  2.2<H>    Ca    8.1<L>      07 Dec 2023 05:35  Phos  9.4     12-07  Mg     3.6     12-07    TPro  5.1<L>  /  Alb  2.0<L>  /  TBili  1.3<H>  /  DBili  x   /  AST  970<H>  /  ALT  545<H>  /  AlkPhos  248<H>  12-07    PT/INR - ( 06 Dec 2023 07:02 )   PT: 15.70 sec;   INR: 1.37 ratio         PTT - ( 06 Dec 2023 07:02 )  PTT:26.2 sec  Urinalysis Basic - ( 07 Dec 2023 05:35 )    Color: x / Appearance: x / SG: x / pH: x  Gluc: 356 mg/dL / Ketone: x  / Bili: x / Urobili: x   Blood: x / Protein: x / Nitrite: x   Leuk Esterase: x / RBC: x / WBC x   Sq Epi: x / Non Sq Epi: x / Bacteria: x      ABG - ( 07 Dec 2023 06:06 )  pH, Arterial: 6.99  pH, Blood: x     /  pCO2: 27    /  pO2: 181   / HCO3: 6     / Base Excess: -23.3 /  SaO2: 99.2              Lactate, Blood: 21.7 mmol/L *HH* (12-07-23 @ 06:00)  Troponin T, Serum: 1.29 ng/mL *HH* (12-07-23 @ 05:35)  Lactate, Blood: 4.2 mmol/L *HH* (12-06-23 @ 21:17)  Troponin T, Serum: 0.32 ng/mL *HH* (12-06-23 @ 18:49)      Culture - Blood (collected 05 Dec 2023 13:00)  Source: .Blood Blood-Peripheral  Gram Stain (06 Dec 2023 06:30):    Growth in aerobic bottle: Gram Positive Cocci in Clusters    Growth in anaerobic bottle: Gram Positive Cocci in Clusters  Preliminary Report (06 Dec 2023 20:49):    Growth in aerobic and anaerobic bottles: Staphylococcus aureus    Direct identification is available within approximately 3-5    hours either by Blood Panel Multiplexed PCR or Direct    MALDI-TOF. Details: [email or web address]  Organism: Blood Culture PCR (06 Dec 2023 07:23)  Organism: Blood Culture PCR (06 Dec 2023 07:23)      CARDIAC MARKERS ( 07 Dec 2023 05:35 )  x     / 1.29 ng/mL / x     / x     / x      CARDIAC MARKERS ( 06 Dec 2023 18:49 )  x     / 0.32 ng/mL / x     / x     / x          RADIOLOGY:

## 2023-12-07 NOTE — CONSULT NOTE ADULT - PROBLEM SELECTOR RECOMMENDATION 3
-continue fentanyl infusion at current rate  -HYDROmorphone  Injectable 0.5 milliGRAM(s) IV Push every 15 minutes PRN pain/dyspnea

## 2023-12-07 NOTE — CONSULT NOTE ADULT - PROBLEM SELECTOR RECOMMENDATION 9
-DNR/DNI with palliative extubation  -Comfort measures only  -AICD deactivation  -MOLST filled out and placed in chart  -No additional IVF, artificial nutrition, blood draws, vital signs, pressors, antibiotics, escalation of oxygen, escalation of care  -Comfort feeds OK  -Anticipate patient may die in hospital - hospice consult if patient stable in 24-48 hours    -continue fentanyl and propofol at current dosing   glycopyrrolate Injectable 0.2 milliGRAM(s) IV Push every 6 hours  -HYDROmorphone  Injectable 0.5 milliGRAM(s) IV Push every 15 minutes PRN pain/dyspnea  -LORazepam   Injectable 0.5 milliGRAM(s) IV Push every 30 minutes PRN anxiety/agitation  -dilaudid 0.5mg IV once 10 minutes prior to extubation

## 2023-12-07 NOTE — CONSULT NOTE ADULT - CONSULT REASON
Septic shock on admission, hx Klebsiella in urine, on levo
Hypotension/CHF
SEPSIS
cyanosis of LLE toes in setting of known history of PAD
GOC and symptom management

## 2023-12-07 NOTE — CONSULT NOTE ADULT - ASSESSMENT
68y M with history of HFrEF s/p AICD, MR/TR, Type 2 DM, HTN, HLD, CAD s/p MI and CABG, PAD, TIAGO, right AKA presents wtih fall. Hospital course complication by MOISES, respiratory failure requiring intubation, Vtach/VT storm.  Patient's family decided to focus on comfort earlier today.  Palliative care consutled to further discuss GOC and give recs for comfort.    Met with family at bedside. Palliative care introduced. We discussed the patient's hospital course and overall grave prognosis. They confirmed that they wished for comfort measures only and no additional aggressive treatment.  We discussed removal of the ventilator in addition to initiation of comfort measures only. They wished for palliative extubation.  All questions answered.    The patient  shortly after palliative extubation.    MEDD (morphine equivalent daily dose):    Education about palliative care provided to patient/family.  See Recs below.    Please call [phone] with questions or concerns .   We will continue to follow.    68y M with history of HFrEF s/p AICD, MR/TR, Type 2 DM, HTN, HLD, CAD s/p MI and CABG, PAD, TIAGO, right AKA presents wtih fall. Hospital course complication by MOISES, respiratory failure requiring intubation, Vtach/VT storm.  Patient's family decided to focus on comfort earlier today.  Palliative care consutled to further discuss GOC and give recs for comfort.    Met with family at bedside. Palliative care introduced. We discussed the patient's hospital course and overall grave prognosis. They confirmed that they wished for comfort measures only and no additional aggressive treatment.  We discussed removal of the ventilator in addition to initiation of comfort measures only. They wished for palliative extubation.  All questions answered.    The patient  shortly after palliative extubation.    MEDD (morphine equivalent daily dose):    Education about palliative care provided to patient/family.  See Recs below.    Please call [phone] with questions or concerns .   We will continue to follow.

## 2023-12-07 NOTE — CONSULT NOTE ADULT - NS ATTEST RISK PROBLEM GEN_ALL_CORE FT
[x ] 1 acute or chronic illnesses or injuries that may pose a threat to life or bodily function  [ x] Decision not to resuscitate, not to intubate, or to de-escalate care because of poor prognosis

## 2023-12-08 LAB
-  AMPICILLIN: SIGNIFICANT CHANGE UP
-  AMPICILLIN: SIGNIFICANT CHANGE UP
-  CIPROFLOXACIN: SIGNIFICANT CHANGE UP
-  CIPROFLOXACIN: SIGNIFICANT CHANGE UP
-  LEVOFLOXACIN: SIGNIFICANT CHANGE UP
-  LEVOFLOXACIN: SIGNIFICANT CHANGE UP
-  NITROFURANTOIN: SIGNIFICANT CHANGE UP
-  NITROFURANTOIN: SIGNIFICANT CHANGE UP
-  TETRACYCLINE: SIGNIFICANT CHANGE UP
-  TETRACYCLINE: SIGNIFICANT CHANGE UP
-  VANCOMYCIN: SIGNIFICANT CHANGE UP
-  VANCOMYCIN: SIGNIFICANT CHANGE UP
CULTURE RESULTS: ABNORMAL
CULTURE RESULTS: ABNORMAL
METHOD TYPE: SIGNIFICANT CHANGE UP
METHOD TYPE: SIGNIFICANT CHANGE UP
ORGANISM # SPEC MICROSCOPIC CNT: ABNORMAL
ORGANISM # SPEC MICROSCOPIC CNT: ABNORMAL
ORGANISM # SPEC MICROSCOPIC CNT: SIGNIFICANT CHANGE UP
ORGANISM # SPEC MICROSCOPIC CNT: SIGNIFICANT CHANGE UP
SPECIMEN SOURCE: SIGNIFICANT CHANGE UP
SPECIMEN SOURCE: SIGNIFICANT CHANGE UP

## 2023-12-08 NOTE — DIETITIAN INITIAL EVALUATION ADULT. - PROBLEM SELECTOR PROBLEM 3
From: Laila Bell  To: Mychal Calvillo  Sent: 12/8/2023 9:47 AM EST  Subject: Urine test    I’m having unusual frequent urination. Would bacteria cause that?  
HLD (hyperlipidemia)

## 2023-12-09 LAB
CULTURE RESULTS: ABNORMAL
CULTURE RESULTS: ABNORMAL
SPECIMEN SOURCE: SIGNIFICANT CHANGE UP
SPECIMEN SOURCE: SIGNIFICANT CHANGE UP

## 2023-12-18 DIAGNOSIS — G47.33 OBSTRUCTIVE SLEEP APNEA (ADULT) (PEDIATRIC): ICD-10-CM

## 2023-12-18 DIAGNOSIS — N17.9 ACUTE KIDNEY FAILURE, UNSPECIFIED: ICD-10-CM

## 2023-12-18 DIAGNOSIS — E03.9 HYPOTHYROIDISM, UNSPECIFIED: ICD-10-CM

## 2023-12-18 DIAGNOSIS — I47.20 VENTRICULAR TACHYCARDIA, UNSPECIFIED: ICD-10-CM

## 2023-12-18 DIAGNOSIS — E11.22 TYPE 2 DIABETES MELLITUS WITH DIABETIC CHRONIC KIDNEY DISEASE: ICD-10-CM

## 2023-12-18 DIAGNOSIS — M06.9 RHEUMATOID ARTHRITIS, UNSPECIFIED: ICD-10-CM

## 2023-12-18 DIAGNOSIS — N39.0 URINARY TRACT INFECTION, SITE NOT SPECIFIED: ICD-10-CM

## 2023-12-18 DIAGNOSIS — Z96.651 PRESENCE OF RIGHT ARTIFICIAL KNEE JOINT: ICD-10-CM

## 2023-12-18 DIAGNOSIS — I13.0 HYPERTENSIVE HEART AND CHRONIC KIDNEY DISEASE WITH HEART FAILURE AND STAGE 1 THROUGH STAGE 4 CHRONIC KIDNEY DISEASE, OR UNSPECIFIED CHRONIC KIDNEY DISEASE: ICD-10-CM

## 2023-12-18 DIAGNOSIS — M89.8X8 OTHER SPECIFIED DISORDERS OF BONE, OTHER SITE: ICD-10-CM

## 2023-12-18 DIAGNOSIS — Z89.511 ACQUIRED ABSENCE OF RIGHT LEG BELOW KNEE: ICD-10-CM

## 2023-12-18 DIAGNOSIS — R53.83 OTHER FATIGUE: ICD-10-CM

## 2023-12-18 DIAGNOSIS — R65.21 SEVERE SEPSIS WITH SEPTIC SHOCK: ICD-10-CM

## 2023-12-18 DIAGNOSIS — E11.51 TYPE 2 DIABETES MELLITUS WITH DIABETIC PERIPHERAL ANGIOPATHY WITHOUT GANGRENE: ICD-10-CM

## 2023-12-18 DIAGNOSIS — D50.9 IRON DEFICIENCY ANEMIA, UNSPECIFIED: ICD-10-CM

## 2023-12-18 DIAGNOSIS — Z95.810 PRESENCE OF AUTOMATIC (IMPLANTABLE) CARDIAC DEFIBRILLATOR: ICD-10-CM

## 2023-12-18 DIAGNOSIS — Z79.82 LONG TERM (CURRENT) USE OF ASPIRIN: ICD-10-CM

## 2023-12-18 DIAGNOSIS — Z66 DO NOT RESUSCITATE: ICD-10-CM

## 2023-12-18 DIAGNOSIS — I25.10 ATHEROSCLEROTIC HEART DISEASE OF NATIVE CORONARY ARTERY WITHOUT ANGINA PECTORIS: ICD-10-CM

## 2023-12-18 DIAGNOSIS — Z96.641 PRESENCE OF RIGHT ARTIFICIAL HIP JOINT: ICD-10-CM

## 2023-12-18 DIAGNOSIS — A41.02 SEPSIS DUE TO METHICILLIN RESISTANT STAPHYLOCOCCUS AUREUS: ICD-10-CM

## 2023-12-18 DIAGNOSIS — L97.929 NON-PRESSURE CHRONIC ULCER OF UNSPECIFIED PART OF LEFT LOWER LEG WITH UNSPECIFIED SEVERITY: ICD-10-CM

## 2023-12-18 DIAGNOSIS — D63.1 ANEMIA IN CHRONIC KIDNEY DISEASE: ICD-10-CM

## 2023-12-18 DIAGNOSIS — I50.22 CHRONIC SYSTOLIC (CONGESTIVE) HEART FAILURE: ICD-10-CM

## 2023-12-18 DIAGNOSIS — I08.1 RHEUMATIC DISORDERS OF BOTH MITRAL AND TRICUSPID VALVES: ICD-10-CM

## 2023-12-18 DIAGNOSIS — E11.40 TYPE 2 DIABETES MELLITUS WITH DIABETIC NEUROPATHY, UNSPECIFIED: ICD-10-CM

## 2023-12-18 DIAGNOSIS — Z79.4 LONG TERM (CURRENT) USE OF INSULIN: ICD-10-CM

## 2023-12-18 DIAGNOSIS — N40.0 BENIGN PROSTATIC HYPERPLASIA WITHOUT LOWER URINARY TRACT SYMPTOMS: ICD-10-CM

## 2023-12-18 DIAGNOSIS — R45.1 RESTLESSNESS AND AGITATION: ICD-10-CM

## 2023-12-18 DIAGNOSIS — Z95.5 PRESENCE OF CORONARY ANGIOPLASTY IMPLANT AND GRAFT: ICD-10-CM

## 2023-12-18 DIAGNOSIS — F40.240 CLAUSTROPHOBIA: ICD-10-CM

## 2023-12-18 DIAGNOSIS — Z91.81 HISTORY OF FALLING: ICD-10-CM

## 2023-12-18 DIAGNOSIS — E78.5 HYPERLIPIDEMIA, UNSPECIFIED: ICD-10-CM

## 2023-12-18 DIAGNOSIS — Z51.5 ENCOUNTER FOR PALLIATIVE CARE: ICD-10-CM

## 2023-12-18 DIAGNOSIS — F41.9 ANXIETY DISORDER, UNSPECIFIED: ICD-10-CM

## 2023-12-18 DIAGNOSIS — Z95.1 PRESENCE OF AORTOCORONARY BYPASS GRAFT: ICD-10-CM

## 2023-12-18 DIAGNOSIS — Z87.440 PERSONAL HISTORY OF URINARY (TRACT) INFECTIONS: ICD-10-CM

## 2023-12-18 DIAGNOSIS — L40.9 PSORIASIS, UNSPECIFIED: ICD-10-CM

## 2023-12-18 DIAGNOSIS — I25.2 OLD MYOCARDIAL INFARCTION: ICD-10-CM

## 2023-12-18 DIAGNOSIS — N18.30 CHRONIC KIDNEY DISEASE, STAGE 3 UNSPECIFIED: ICD-10-CM

## 2023-12-18 DIAGNOSIS — J96.90 RESPIRATORY FAILURE, UNSPECIFIED, UNSPECIFIED WHETHER WITH HYPOXIA OR HYPERCAPNIA: ICD-10-CM

## 2023-12-18 NOTE — DISCHARGE NOTE PROVIDER - NSDCFUADDINST_GEN_ALL_CORE_FT
----- Message from Sam Tariq sent at 12/18/2023  2:03 AM CST -----  Regarding: Client Services  Contact: [phone]  Good Morning,     My name is Sam Tariq, I work in the Lab Client Services. We had a problem with some lab work on this patient. If someone from your office could call us at [phone] or [phone] that would be great. Anyone in my department can help.      Thank you,        Follow-up with Orthopedics (Dr. Jose Castro) within 1 week of discharge.    Follow-up with Dr. Zahra Casillas with infectious diseases after discharge. She will be following your blood work (CBC, CMP, ESR, CRP, Vanc trough) and helping to plan your antibiotic regimen.    Follow- up with Dr. Rajinder Mckeon (plastic surgery) within 1 week of discharge for wound management.     Follow-up with Dr. Davalos (podiatry) outpatient, or a podiatrist of your choice for continued diabetic foot ulcer management.    Follow-up with Dr. Latif (general surgery), or a general surgeon of your choice regarding your need for a cholecystectomy (gallbladder removal) in the near future. Report to ER for fever, abdominal pain, nausea/vomiting.    Please continue to follow-up with your primary care doctor, cardiologist Dr. Caldera, and vascular doctor Dr. Malloy for complete outpatient medical follow-up.    Touch- toe weight bearing on right side with right leg in knee immobilizer at all times. Weight- bearing as tolerated on left leg.  No strenuous activity, heavy lifting, driving or returning to work until cleared by MD.    ESR, CRP, CBC with diff, CMP, Vanc trough should be drawn weekly and results faxed to Infectious Disease Dr. Casillas [phone]    Your left leg dressing should be changed daily with bacitracin, adaptic, ABD pads and kerlix gauze. Your right knee should be wrapped gently with an ace wrap at all times.    Try to have regular bowel movements, take stool softener or laxative if necessary.    Swelling may travel all the way down leg to foot, this is normal and will subside in a few weeks.  Call to schedule an appt with Dr. Castro for follow up, if you have staples or sutures they will be removed in office.    Contact your doctor if you experience: fever greater than 101.5, chills, chest pain, difficulty breathing, redness or excessive drainage around the incision, other concerns.  Follow up with your primary care provider.      The skin graft sites on the left shin and right leg below the knee should be covered with bacitracin, adaptec, ABD pads and kerlix gauze and should be changed daily. The graft below the right knee should be gently wrapped with an ace wrap at all times as well.

## 2023-12-21 ENCOUNTER — APPOINTMENT (OUTPATIENT)
Dept: ENDOCRINOLOGY | Facility: CLINIC | Age: 68
End: 2023-12-21

## 2023-12-21 NOTE — PROGRESS NOTE ADULT - ASSESSMENT
January 24, 2024      Rupal Henderson  [address] [place] [id number]        Dear Rupal,      The results of your colonoscopy performed on 1/17/2024 have returned and indicate the following:      Colon Results:  Tubulovillous adenoma    INFORMATION:   Tubulovillous Adenoma Polyp  Tubulovillous adenomas are considered precancerous lesions. If these polyps are not removed, over time they can lead to colon cancer.  Although your polyp(s) were removed during the procedure, these types of polyps can reoccur or other polyps may develop. These polyps require close surveillance with follow-up colonoscopies at shorter intervals.    I am recommending a follow-up colonoscopy in 3 years.     It has been a pleasure caring for you.  If you have any questions, please feel free to contact my office at [phone].    Sincerely,          Adela Jackson MD  Gastroenterology  [address], [address]  [place] [address]   67 yo M w pmh of CAD (s/p multiple surgeries), CHF (15-20% EF), uncontrolled DM, HLD, HTN and R TKA presented with a history of acute cholecystitis s/p perc sudhir in 2/2020 c/b cholecystocutaneous fistula planned for a takedown and and cholecystectomy.    pain/nausea control  DVT ppx/ ASA/ SCD/ NO SQH  NPO/IVF  Loja  Post op CXR  Post op EKG   65 yo M w pmh of CAD (s/p multiple surgeries), CHF (15-20% EF), uncontrolled DM, HLD, HTN and R TKA presented with a history of acute cholecystitis s/p perc sudhir in 2/2020 c/b cholecystocutaneous fistula planned for a takedown and and cholecystectomy 6/22/22.    pain/nausea control  CLD  TOV 3  DVT ppx/ ASA/ SCD  AM Labs

## 2023-12-21 NOTE — PATIENT PROFILE ADULT - FALL HARM RISK - DEVICES
Products Recommended: Daily moisturizer with spf. Elta Md lotion , Elta MD daily, Elta MD clear, Cerave AM\\n\\nFor expected Exposure: Elta Pure, Elta Active. Sun Bum Mineral lip balm
Detail Level: Zone
General Sunscreen Counseling: Discussed moisturizer with sunscreen should be applied to exposed areas daily, each am,  For any expected exposure a heavier sunscreen should be applied to all exposed areas and be reapplied every two hours while exposed . The sunscreen should be broad spectrum spf 30-50 , UVA/UVB and contain Zinc and Titanium Dioxide. Recommend Elta MD products. Recommend sunprotective clothing . Such as long sleeve UPF protective shirts, wide brim hats, neck covers and sunglasses. Sunscreens should be applied at least 15 minutes prior to expected sun exposure and then every 2 hours after that as long as sun exposure continues. If swimming or exercising sunscreen should be reapplied every 45 minutes to an hour after getting wet or sweating.   I also recommended a  mineral based lip balm with zinc as well. Chemical lip balms should be avoided. Sun protective clothing can be used in lieu of sunscreen but must be worn the entire time you are exposed to the sun's rays.
Other

## 2023-12-22 ENCOUNTER — RX RENEWAL (OUTPATIENT)
Age: 68
End: 2023-12-22

## 2023-12-26 ENCOUNTER — RX RENEWAL (OUTPATIENT)
Age: 68
End: 2023-12-26

## 2023-12-26 RX ORDER — ROSUVASTATIN CALCIUM 40 MG/1
40 TABLET, FILM COATED ORAL
Qty: 90 | Refills: 0 | Status: ACTIVE | COMMUNITY
Start: 2019-10-14 | End: 1900-01-01

## 2024-01-08 NOTE — H&P ADULT - GENERAL
----- Message from Meka Rodriguez MA sent at 1/8/2024 10:40 AM CST -----  Regarding: FW: question  Contact: @ [phone]  Can you call and discuss symptoms with patient? He is scheduled for 1/10.  ----- Message -----  From: Antoinette Kimbrough  Sent: 1/8/2024  10:38 AM CST  To: Kaleigh [facility] Staff  Subject: question                                         Pt wife  called in regards to seeing if the surgery will stay as booked her  had developed a cough and sore thorat  .....Please call and adv @ [phone]         negative

## 2024-01-09 NOTE — ED ADULT NURSE NOTE - PAIN RATING/NUMBER SCALE (0-10): REST
Problem: PAIN - ADULT  Goal: Verbalizes/displays adequate comfort level or baseline comfort level  Description: Interventions:  - Encourage patient to monitor pain and request assistance  - Assess pain using appropriate pain scale  - Administer analgesics based on type and severity of pain and evaluate response  - Implement non-pharmacological measures as appropriate and evaluate response  - Consider cultural and social influences on pain and pain management  - Notify physician/advanced practitioner if interventions unsuccessful or patient reports new pain  Outcome: Not Progressing      5

## 2024-01-11 ENCOUNTER — APPOINTMENT (OUTPATIENT)
Dept: RHEUMATOLOGY | Facility: CLINIC | Age: 69
End: 2024-01-11

## 2024-01-15 NOTE — ADVANCED PRACTICE NURSE CONSULT - RECOMMEDATIONS
"Recommend continuing to use Nystatin powder to bilat buttock rash. Spoke with RN Kamila Peguero and house staff.

## 2024-01-21 NOTE — PROGRESS NOTE ADULT - ATTENDING COMMENTS
I have reviewed the medical record, including laboratory and radiographic studies, interviewed and examined the patient and discussed the plan with Dr. Noel, the ID Resident.  Agree with above. Will continue to follow with you – ID Team 1. [facility]

## 2024-01-30 NOTE — BRIEF OPERATIVE NOTE - PRE-OP DX
Coronary artery disease with other form of angina pectoris  06/01/2018    Active  Kari Pablo Right buttock

## 2024-02-20 NOTE — PROGRESS NOTE ADULT - SUBJECTIVE AND OBJECTIVE BOX
DIABETES INSTRUCTIONS:    1. Check your blood sugars before meals and before bed. This is very important, especially when you are on prednisone.    2. See your primary care doctor on 2/28/24 at 2:15 pm. Ask about a patch that comes with a meter so you can check your glucose without finger pokes. Bring your meter to your appointment.    3. Call us if your blood sugars are less than 70 mg/dL or greater than 250 mg/dL for more than two readings in a row.    [facility]  [address]  [place] [address]  Office: [phone]  Fax: [phone]      INSULIN INSTRUCTIONS    Take long acting insulin Lantus 52 units every day    Take short acting insulin Novolog. DO NOT TAKE IF NOT EATING. DO NOT TAKE IF BLOOD SUGAR IS LESS THAN 80 mg/dL.     Blood Sugar Breakfast Lunch Dinner   80-99 10 10 10   100-150 16 16 16   151-200 17 17 17   201-250 18 18 18   251-300 19 19 19   Over 300 20 20 20     Goal blood sugar  mg/dL      Low Blood Sugar (Hypoglycemia)     Fast-acting sugar can be a glass of nonfat milk.        Having too little sugar (glucose) in your blood is called low blood sugar (hypoglycemia). Low blood sugar often means anything lower than 70 mg/dL. Talk with your healthcare provider about your target range. Ask what level is too low for you. Diabetes itself doesn’t cause low blood sugar. But some treatments for diabetes may raise your risk for it. These include pills or insulin. Low blood sugar may make you pass out or have a seizure. So always treat low blood sugar right away. But don't overeat.  Special note: Always carry a source of fast-acting sugar and a snack in case of hypoglycemia.  What you may notice  If you have low blood sugar, you may have one or more of these symptoms:  Shakiness or dizziness  Cold, clammy skin or sweating  Feeling hungry  Headache  Nervousness  A hard, fast heartbeat  Weakness  Confusion or irritability  Blurred eyesight  Having  INTERVAL HPI/OVERNIGHT EVENTS:  continues to get hypotensive getting OOB with PT , otherwise feeling well.    MEDICATIONS  (STANDING):  acetaminophen   Tablet .. 650 milliGRAM(s) Oral every 6 hours  aspirin enteric coated 81 milliGRAM(s) Oral daily  atorvastatin 80 milliGRAM(s) Oral at bedtime  BUpivacaine liposome 1.3% Injectable (no eMAR) 20 milliLiter(s) Local Injection once  cefpodoxime 200 milliGRAM(s) Oral every 12 hours  celecoxib 100 milliGRAM(s) Oral two times a day  dextrose 10% Bolus 125 milliLiter(s) IV Bolus once  dextrose 10% Bolus 250 milliLiter(s) IV Bolus once  dextrose 5%. 1000 milliLiter(s) (50 mL/Hr) IV Continuous <Continuous>  dextrose 50% Injectable 12.5 Gram(s) IV Push once  dextrose 50% Injectable 25 Gram(s) IV Push once  dextrose 50% Injectable 25 Gram(s) IV Push once  DULoxetine 90 milliGRAM(s) Oral daily  insulin aspart (NovoLOG) Pump 1 Each SubCutaneous Continuous Pump  metoprolol succinate  milliGRAM(s) Oral <User Schedule>  omega-3-Acid Ethyl Esters 4 Gram(s) Oral daily  pantoprazole    Tablet 40 milliGRAM(s) Oral before breakfast  polyethylene glycol 3350 17 Gram(s) Oral daily  prasugrel 10 milliGRAM(s) Oral daily  saccharomyces boulardii 250 milliGRAM(s) Oral two times a day    MEDICATIONS  (PRN):  aluminum hydroxide/magnesium hydroxide/simethicone Suspension 30 milliLiter(s) Oral four times a day PRN Indigestion  bisacodyl Suppository 10 milliGRAM(s) Rectal daily PRN If no bowel movement by postoperative day #2  dextrose 40% Gel 15 Gram(s) Oral once PRN Blood Glucose LESS THAN 70 milliGRAM(s)/deciliter  glucagon  Injectable 1 milliGRAM(s) IntraMuscular once PRN Glucose LESS THAN 70 milligrams/deciliter  HYDROmorphone  Injectable 0.5 milliGRAM(s) IV Push every 4 hours PRN Breakthrough Pain  HYDROmorphone  Injectable 0.5 milliGRAM(s) IV Push every 30 minutes PRN Breakthrough Pain  morphine  - Injectable 2 milliGRAM(s) IV Push every 4 hours PRN Severe Pain (7 - 10)  ondansetron Injectable 4 milliGRAM(s) IV Push every 6 hours PRN Nausea and/or Vomiting  oxyCODONE    IR 5 milliGRAM(s) Oral every 4 hours PRN Mild Pain (1 - 3)  oxyCODONE    IR 10 milliGRAM(s) Oral every 4 hours PRN Moderate Pain (4 - 6)  senna 2 Tablet(s) Oral at bedtime PRN Constipation      Allergies    ACE inhibitors (Hives)  enalapril (Hives)    Intolerances        REVIEW OF SYSTEMS:  CONSTITUTIONAL:  No night sweats.  No fatigue,  No fever or chills.  HEENT:  Eyes:  No visual changes.  No eye pain.  .    ENT:    No sinus pain.  No sore throat.  No odynophagia.  .  RESPIRATORY:  No cough.  No wheeze.    No shortness of breath.  CARDIOVASCULAR:  No chest pains.  No palpitations.  GASTROINTESTINAL:  No abdominal pain.  No nausea or vomiting.  no diarrhea  GENITOURINARY:  No urgency.  No frequency.  No dysuria.  No hematuria.    MUSCULOSKELETAL:  right knee pain present   SKIN:  No rashes.  No lesions.     T(C): 36.6 (10-25-19 @ 05:04), Max: 37.4 (10-24-19 @ 16:00)  HR: 74 (10-25-19 @ 05:04) (74 - 102)  BP: 111/68 (10-25-19 @ 05:04) (103/60 - 143/81)  RR: 17 (10-25-19 @ 05:04) (17 - 18)  SpO2: 97% (10-25-19 @ 05:04) (94% - 97%)  Wt(kg): --    PHYSICAL EXAM:   General - NAD, Alert and oriented x 3,   Eyes - PERRLA, EOM intact  Neck - - No lymphadenopathy  Cardiovascular - RRR no m/r/g, no JVD  Lungs - Clear to auscultation, no use of accessory muscles, no crackles or wheezes.  Skin - No rashes, skin warm and dry  Abdomen - Normal bowel sounds, abdomen soft and nontender  Extremeties - No edema, cyanosis or clubbing  Neurological – no focal deficits    LABS:                        11.7   13.49 )-----------( 166      ( 23 Oct 2019 15:13 )             37.9     10-23    138  |  103  |  17  ----------------------------<  259<H>  4.9   |  25  |  0.94    Ca    9.2      23 Oct 2019 15:13            RADIOLOGY & ADDITIONAL TESTS: nightmares or waking up confused or sweating  Numbness or tingling in the lips or tongue  What you should do  Here are tips to follow if you have hypoglycemia:   First check your blood sugar. If it's too low (out of your target range), eat or drink 15 to 20 grams of fast-acting sugar. This may be 3 to 4 glucose tablets, 4 ounces (half a cup) of fruit juice or regular (nondiet) soda, or 1 tablespoon of honey. Don’t take more than this, or your blood sugar may go too high.  Don't eat foods high in protein such as milk or nuts to treat hypoglycemia. Protein may increase your insulin response. It may lower your blood sugar even more.  Wait 15 minutes. Then recheck your blood sugar if you can.  If your blood sugar is still too low, repeat the steps above and check your blood sugar again. If your blood sugar still has not gone back to your target range, contact your healthcare provider. Or seek emergency care.  Once your blood sugar is back at target range, eat a snack or meal.  Preventing low blood sugar  Things you can do include the following:   If your condition needs a strict treatment plan, eat meals and snacks at the same times each day. Don’t skip meals!  If your treatment plan lets you change when and what you eat, learn how to change the time and dose of your rapid-acting insulin to match this.   Ask your healthcare provider if it's safe to drink alcohol. Never drink on an empty stomach.  Take your medicine at the prescribed times.  Always carry a source of fast-acting sugar and a snack when you’re away from home.  If you have had several hypoglycemic episodes, talk with your healthcare provider. See if you may be able to take less medicine. You also may have a condition where you no longer recognize the symptoms of low blood sugar until the value falls to dangerous levels.  Other things to do  Other tips include:  Carry a medical ID card or wear a medical alert bracelet or necklace. It should say that you have  diabetes. It should also say what to do if you pass out or have a seizure.  Make sure your family, friends, and coworkers know the signs of low blood sugar. Tell them what to do if your blood sugar falls very low and you can’t treat yourself.  Keep a glucagon emergency kit handy. Be sure your family, friends, and coworkers know how and when to use it. Check it often. Replace the glucagon before it expires.  Talk with your healthcare team about other things you can do to prevent low blood sugar.  If you have unexplained hypoglycemia or hypoglycemia several times, call your healthcare provider.  [employer] last reviewed this educational content on 11/1/2018  © 7633-1834 [employer]. [address] [place] [address], [place] [address]. All rights reserved. This information is not intended as a substitute for professional medical care. Always follow your healthcare professional's instructions.

## 2024-02-27 NOTE — ED ADULT NURSE NOTE - NSFALLRSKHARMRISK_ED_ALL_ED
[FreeTextEntry1] : Patient returns after the first SERGEY a few weeks ago.  The patient reports 60% improvement in the symptoms.  The patient is pleased with the results and returns for a follow up visit today. 
no

## 2024-03-04 NOTE — DISCHARGE NOTE PROVIDER - NSDCQMSTAIRS_GEN_ALL_CORE
[de-identified] : General: AAOx3, NAD HEENT: EOMI, Sclera white CVS: +2 Pulses Pulmonary: No Respiratory Distress Abdomen: Soft, NT, ND MSK: Moving all extremities against gravity, Resting tremor + Straight leg raise positive LEFT Neuro: Sensation I to LT Extremities: (-)Edema Derm: No Rash Psych: Calm, Cooperative 
No

## 2024-03-05 NOTE — ED PROVIDER NOTE - NS ED MD DISPO ISOLATION TYPES
Goal Outcome Evaluation:     Pertinent assessments: Pt is A & O x 4, up ind, denies pain, BG Q 4 hrs, enteric precautions maintained.  Major Shift Events; AM BG 72, D5 NS @ 100 ml/hr started  Treatment Plan: NPO, IVF @ 100 l/hr, GI consult, BG checks, pain management, Mg & K replacement.  Bedside Nurse: Gustavo Daily RN                     None

## 2024-03-12 NOTE — PATIENT PROFILE ADULT - ANY IMPAIRED UPPER EXTREMITY FUNCTION WITHIN A WEEK PRIOR TO ADMISSION RELATED TO?
"Medical screening examination initiated.  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 Went to UC and told he has a fx and told to go to PCP. Pt reports unable to see PCP today so came here      Brief history of present illness:  61 y.o. male presents to the ED with left leg pain s/t fall this morning. States he went to urgent care and had XR that showed possible fracture of left tibial stump; told to f/u with PCP however they are closed.     Vitals:    03/12/24 1520   BP: 136/77   Pulse: 87   Resp: 18   Temp: 98.3 °F (36.8 °C)   TempSrc: Oral   SpO2: 95%   Weight: 93 kg (205 lb)   Height: 5' 10" (1.778 m)       Pertinent physical exam:  Awake, alert, non-labored respirations      Brief workup plan:  Ct, meds    Preliminary workup initiated; this workup will be continued and followed by the physician or advanced practice provider that is assigned to the patient when roomed.  " no

## 2024-03-19 NOTE — H&P ADULT - NS NEC GEN PE MLT EXAM PC
PAST SURGICAL HISTORY:  History of cholecystectomy     History of hand surgery (pt states she had surgical removal of a cancerous cyst of her left hand at age 14)    Spinal cord neurostimulator device in situ     
not examined

## 2024-04-09 NOTE — DIETITIAN INITIAL EVALUATION ADULT. - PROBLEM SELECTOR PLAN 1
<<--- Click to launch Admit to Orthopedic Service   Patient to undergo right knee I&D, possible poly exchange with Dr. Estes today 11/22/19  Patient to be seen by Dr Caldera- cardiologist today prior to OR   maintain NPO  preop labs, EKG, CXR per ER  hold abx until OR

## 2024-04-11 NOTE — OCCUPATIONAL THERAPY INITIAL EVALUATION ADULT - LEVEL OF INDEPENDENCE: DRESS LOWER BODY, OT EVAL
Daily Dosage In Cgy: 300 Add Another Treatment Site: No Total Site Dosage In Cgy: 4800 Diagnosis: SCC Date Of Completion: 03/29/2024 Site: left temple Detail Level: Simple Treatment #: 16 maximum assist (25% patients effort)

## 2024-04-12 ENCOUNTER — APPOINTMENT (OUTPATIENT)
Dept: ELECTROPHYSIOLOGY | Facility: CLINIC | Age: 69
End: 2024-04-12

## 2024-04-19 ENCOUNTER — APPOINTMENT (OUTPATIENT)
Dept: CARDIOLOGY | Facility: CLINIC | Age: 69
End: 2024-04-19

## 2024-04-20 NOTE — PATIENT PROFILE ADULT - FALLEN IN THE PAST
Airway patent, Nasal mucosa clear. Mouth with normal mucosa. Throat has no vesicles, no oropharyngeal exudates and uvula is midline.
yes

## 2024-05-03 NOTE — PROGRESS NOTE ADULT - PROVIDER SPECIALTY LIST ADULT
CCU
Cardiology
Cardiology
Consultation - Diabetes     PCP: Amanuel Traylor MD    Chief Complaint   Patient presents with    Diabetes     HPI:  Mindy Olivier is a 59 y.o. female with  has a past medical history of Body mass index 40.0-44.9, adult ([facility]), Chronic obstructive lung disease (HCC), Chronic otitis externa, Chronic otitis media, Cyst of ovary, Diabetes (HCC), Diabetes mellitus type 2, controlled ([facility]), Ear problems, Endometriosis, Essential hypertension, High cholesterol, Hypercholesterolemia, Hypertension, Ingrown toenail, Long term (current) use of systemic steroids, Mixed hyperlipidemia, Morbid obesity (HCC), Obesity, Obstructive sleep apnea of adult, Tetralogy of Fallot, and Uncontrolled type 2 diabetes mellitus. who is seen in consultation at the request of Amanuel Traylor MD for evaluation of diabetes.     Patient of dr light here to establish care   patient was diagnosed with diabetes in May 2019 during labs done as a part of annual physical.   Patient had gastric bypass surgery on 11- by Dr. Adhikari.  Since then she has lost more than 70 pounds.  · Past treatment: metformin, metformin ER (nausea and stomach pain), glimepiride, Basaglar, Humalog, V-Go, Januvia   Complications of DM:   · CAD: no. 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   · CVA: no   · PVD: no   · Amputations: no    · Retinopathy: no; last exam was February 2020   · Gastropathy: no   · Nephropathy: no   · Neuropathy: no  Medications:   · Statin: Atorvastatin 80   · ACE-I: losartan   · ASA: no    Prednisone 10 mg PRN - for copd on oxygen     DM meds:   Trulicity - not taking, too expensive   Ozmepic 0.5 mg qweek     BRIEF ROS   Gen: no fevers/chills  CV: no chest pain  Resp: +chronic shortness of breath  GI: no nausea, emesis, abdominal pain  Neuro: no confusion     LABS/STUDIES:     Lab Results   Component Value Date/Time    ATA2KTQL 6.4 07/11/2022 10:14 AM    SSO1VERF 6.8 04/11/2022 11:12 AM    XAU1SXIJ 8.4 07/09/2021 03:23 PM    ELZ2FRFN 9.7 
Heart Failure
Hospitalist
Infectious Disease
Internal Medicine
Nephrology
Nephrology
CCU
Cardiology
Cardiology
Heart Failure
Hospitalist
Hospitalist
Internal Medicine
Nephrology
Palliative Care
Pulmonology
CCU
Cardiology
Heart Failure
Heart Failure
Hospitalist
Internal Medicine
Nephrology
Nephrology
CCU
Heart Failure
Hospitalist
Internal Medicine
Nephrology
Pulmonology
Infectious Disease
Internal Medicine
Pulmonology
Internal Medicine
Palliative Care

## 2024-05-07 NOTE — PHYSICAL THERAPY INITIAL EVALUATION ADULT - PATIENT PROFILE REVIEW, REHAB EVAL
Problem: Chronic Conditions and Co-morbidities  Goal: Patient's chronic conditions and co-morbidity symptoms are monitored and maintained or improved  Outcome: Progressing     
yes
yes
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
## 2024-06-17 NOTE — ED PROVIDER NOTE - NS ED MD DISPO SPECIAL CONSIDERATION1
Received vm from patient about referral sent and wanting to schedule appointment.   I attempted to call patient and schedule an appointment but phone went to  and unable to leave message due to mailbox being full.    None

## 2024-06-28 NOTE — PATIENT PROFILE ADULT - NSPROIMPLANTSMEDDEV_GEN_A_NUR
Chief Complaint   Patient presents with    Routine Prenatal Visit     34 weeks       Esme Herrera is a 37 y.o.  at 34w0d  here for routine OB visit  Reports no SOA/chest pain  She is overall doing well.     BP 95/63   Wt 66.2 kg (146 lb)   LMP 10/17/2023   BMI 27.59 kg/m²        Gen: NAD, well appearing  Abd: nontender    See OB Flowsheet    ASSESSMENT/PLAN:  Diagnoses and all orders for this visit:    1. 34 weeks gestation of pregnancy (Primary)  -     POC Urinalysis Dipstick    2. S/P CABG x 3    3. Antepartum multigravida of advanced maternal age    4. Intracranial arachnoid cyst    5. Duplicated renal collecting system      BPP   Plan for GBS next visit  Encouraged her to go by L&D at time of next M visit for an anesthesia consultation prior to delivery  IOL will plan for 37 weeks  Completed maternal echo on , unchanged from prior study  Routine counseling pertinent to this stage of pregnancy was reviewed including third trimester precautions  Return in about 6 weeks (around 2024) for OB visit and BPP.                 
Automatic Implantable Cardioverter Defibrillator/Artificial joint/Pacemaker

## 2024-07-05 NOTE — PROGRESS NOTE ADULT - PROBLEM SELECTOR PLAN 2
"  Daily Progress Note.   [address]  7/5/2024    Patient:  Name:  Brigid Carballo  MRN:  [id number]  1942  81 y.o.  female         Requesting physician: Dr Kaiden Tamez     Reason for Consultation:  soa, lung nodule,      CC: soa     History of Present Illness:  Patient is a 80 yo with a pmh with parainfluenza viral infection anxiety, gerd, melenoma, who presented to the emergency room with worsening cough associated with chest pain and lower extremity swelling.  She described chest pain not as pleuritic but more so the heaviness/chest pressure.     No prior formal diagnosis of COPD however she is an active smoker.  No daily inhalers no prior pulmonary function testing.     She is found to have parainfluenza virus was admitted on the 28th.  She was started on IV Solu-Medrol DuoNebs and supplemental oxygen.     CT scan has shown a pulmonary nodule     Patient denies chest pressure at present time.  She would like her breathing is getting better.      Gentle diuresis the past 2 days  Hypertonic saline stopped to see if this helps reduce bronchospasm        Interval History:  Awake alert  On room air.  Still with cough sputum production.  Upset regarding tape      Physical Exam:  /61 (BP Location: Left arm, Patient Position: Lying)   Pulse 64   Temp 97.6 °F (36.4 °C) (Oral)   Resp 20   Ht 157.5 cm (62\")   Wt 64.8 kg (142 lb 13.7 oz)   SpO2 96%   BMI 26.13 kg/m²   Body mass index is 26.13 kg/m².    Intake/Output Summary (Last 24 hours) at 7/5/2024 0957  Last data filed at 7/5/2024 0843  Gross per 24 hour   Intake 480 ml   Output 300 ml   Net 180 ml     General appearance: Nontoxic, conversant   Eyes: Anicteric sclerae, moist conjunctivae; no lid lag;   HENT: Atraumatic; oropharynx clear with moist mucous membranes and no mucosal ulcerations; normal hard and soft palate  Neck: Trachea midline;supple,   Lungs: Better air movement no wheeze positive intermittent rhonchi, with calm " respiratory effort and no intercostal retractions  CV: RRR, no MRGs   Abdomen: No significant trunk obesity bowel sounds positive  Extremities: No peripheral edema or extremity lymphadenopathy  Skin: Warm well-perfused  Psych: Appropriate affect, alert  Neuro cranials 2 through grossly intact speech intact moves extremities    Data Review:  Notable Labs:  Results from last 7 days   Lab Units 07/03/24 0428 07/02/24 0445 06/29/24  0525 06/28/24  1633   WBC 10*3/mm3 8.80 8.79 2.66* 3.96   HEMOGLOBIN g/dL 14.4 13.6 13.3 14.6   PLATELETS 10*3/mm3 169 160 144 146     Results from last 7 days   Lab Units 07/05/24  0604 07/03/24 0428 07/02/24 0445 06/30/24  0520 06/29/24  0525 06/28/24  1633   SODIUM mmol/L 141 141 141 140 141 143   POTASSIUM mmol/L 5.1 4.8 4.5 4.4 4.1 4.0   CHLORIDE mmol/L 100 105 108* 107 107 108*   CO2 mmol/L 35.0* 29.0 28.3 23.0 26.0 25.0   BUN mg/dL 22 19 21 20 20 17   CREATININE mg/dL 0.82 0.74 0.79 0.65 0.58 0.81   GLUCOSE mg/dL 91 132* 138* 133* 130* 84   CALCIUM mg/dL 8.5* 8.9 8.7 9.0 9.2 9.0   MAGNESIUM mg/dL  --   --   --  3.1* 2.3  --    Estimated Creatinine Clearance: 49.2 mL/min (by C-G formula based on SCr of 0.82 mg/dL).    Results from last 7 days   Lab Units 07/03/24  0428 07/02/24 0445 06/29/24  0525 06/28/24  1633   AST (SGOT) U/L  --   --   --  15   ALT (SGPT) U/L  --   --   --  10   PLATELETS 10*3/mm3 169 160 144 146             Imaging:  Reviewed chest images personally from past 3 days    ASSESSMENT  /  PLAN:  Parainfluenza viral infection  Lung nodules -discussed with patient will need outpatient follow-up  Bronchospasm secondary to above  Smoker  Anterior chest wall nodule  AHRF  Leukopenia repeat CBC shows resolution  Pulmonary edema  Left pleural effusion       Pulmonary nodule require outpatient follow-up.  Would suggest PET scan upon follow-up given size and smoking history.     She says this has been there for over 10 years.  She follows with dermatologist.  Radiology  recommending dermatology consultation she will bring this to the dermatologist attention.     P.o. pred 40 mg  Continue Symbicort  Continue DuoNebs  Flutter valve  Incentive spirometer  Needs outpatient pulmonary function testing.  Mucomyst see if this helps liberate sputum more without bronchospasm  Mucinex     Smoking cessation counseling given  Exercise oximetry    Electronically signed by Jin Blancas MD, 07/05/24, 10:31 AM EDT.              9 day s/p arthroscopic I and D, 7/17/2020.  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 right knee explant and antibiotic spacer.  Will follow with [facility]ID regarding the right hip cultures which is negative to date and does not look infected

## 2024-07-18 NOTE — PROCEDURE NOTE - NSTIMEOUT_GEN_A_CORE
Patient's first and last name, , procedure, and correct site confirmed prior to the start of procedure. ,

## 2024-07-30 NOTE — PROGRESS NOTE ADULT - SUBJECTIVE AND OBJECTIVE BOX
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  Incision C/D/I, pain controlled.   Vascular surgery saw here last on 7/10 and removed staples  Continue ASA and statin   Also on Xarelto due to LV thrombus but Xarelto not covered under pt's insurance.  Initiated on Warfarin 7/29 subtherepeutic will start Wt based Lovenox bridge until INR > 2.0  Rehab and pain control per PMR  Pt has been refusing therapy through the weekend.   Pt has met his rehab goals and will be discharged when able.    Episodic headache  Assessment & Plan  Pt reports a history of migraines.  It is associated with photophobia.  He is unable to take triptans due to a history of stroke.  Given Nurtec 7/21/24.    Cardiomyopathy (HCC)  Assessment & Plan  ECHO 6/10/2024 = LVEF 40-45% with mild global hypokinesis   Status post NM stress test on 6/11/2024 which showed: a large, mild, fixed defect in the inferior wall, possibly due to diaphragmatic attenuation artifact, there is a small area of partial reversibility in the inferior apical wall suggestive of ischemia    Evaluated ed by cardiology, etiology felt to be possibly secondary to stress-induced cardiomyopathy, with apical thrombus  Cardiac catheterization deferred given the lack of any significant ischemia, and no current cardiac symptoms  Continue with medical management with aspirin, statin, beta-blocker, ARB  Monitor volume status, remains euvolemic off of diuretics   Euvolemic on exam  Outpatient follow-up with cardiology    Neurocognitive  disorder  Assessment & Plan  Evaluated by neuropsychology and found to not have capacity  Guardianship in progress     Pressure injury of buttock, stage 3 (HCC)  Assessment & Plan  Being managed by PMR  Turn every 2 hours for offloading  Continue local care    Patient incapable of making informed decisions  Assessment & Plan  Seen by neuropsych on 6/27 and was deemed NOT to have medical decision making capacity    At risk for constipation  Assessment & Plan  Denies constipation     Acute pain  Assessment & Plan  Due to the AKA  Pain controlled     At risk for venous thromboembolism (VTE)  Assessment & Plan  Xarelto not affordable pt started on Warfarin   Will order Wt based Lovenox until INR therepeutic 2.0- 3.0    Traumatic brain injury (HCC)  Assessment & Plan  Remote history of TBI, previously residing in a group home prior to halfway sentence.  Evaluated by neuropsychiatry on 6/27/24 and deemed NOT to have medical decision-making capacity  Process for court appointed guardian started on 7/5/24 - CM following   Guardianship hearing 8/27/24.    Carnitine deficiency (HCC)  Assessment & Plan  Continue levocarnitine 330 mg 3x daily with meals.    History of seizures  Assessment & Plan  Diagnosed in July 2019, follows with [facility] neurology outpatient  Continue home regimen with Depakote ER, Lamotrigine and Zonegran    Left ventricular thrombus  Assessment & Plan  Noted on echocardiogram 6/10/2024: spherical 1.5 x 1.3 cm thrombus at the LV apex   Initiated on AC with Xarelto, continue  Rx sent to [facility] for price check on 7/10/24 - CM spoke with [employer] and pt has rx coverage. Information sent to [employer]. [employer] ran the insurance and it is not active. CM to call the pharmacy pt used most recently to see if they have different information.    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    Bipolar affective disorder (HCC)  Assessment & Plan  Continue home meds Zoloft and Remeron  Outpatient follow-up with Psychiatry  Doing well             The above assessment and plan was reviewed and updated as determined by my evaluation of the patient on 7/30/2024.    Labs:   Results from last 7 days   Lab Units 07/25/24  0518   WBC Thousand/uL 6.34   HEMOGLOBIN g/dL 10.5*   HEMATOCRIT % 36.0*   PLATELETS Thousands/uL 384     Results from last 7 days   Lab Units 07/25/24  0518   SODIUM mmol/L 139   POTASSIUM mmol/L 4.0   CHLORIDE mmol/L 105   CO2 mmol/L 25   BUN mg/dL 20   CREATININE mg/dL 0.85   CALCIUM mg/dL 8.9         Results from last 7 days   Lab Units 07/30/24  0642 07/29/24  1102   INR  1.07 1.12           Imaging  No orders to display       Review of Scheduled Meds:  Current Facility-Administered Medications   Medication Dose Route Frequency Provider Last Rate    acetaminophen  975 mg Oral TID PRN José Salcido MD      aspirin  81 mg Oral Daily Lorrie Barillas PA-C      atorvastatin  80 mg Oral Daily With Dinner Lorrie Barillas PA-C      bictegravir-emtricitab-tenofovir alafenamide  1 tablet Oral Daily With Breakfast Lorrie Barillas PA-C      bisacodyl  10 mg Rectal Daily PRN Lorrie Barillas PA-C      diphenhydrAMINE  25 mg Oral Q6H PRN Lorrie Barillas PA-C      divalproex sodium  1,250 mg Oral Daily Lorrie Barillas PA-C      docusate sodium  100 mg Oral BID Ashley Depadua, MD      dolutegravir  50 mg Oral Daily Lorrie Barillas PA-C      enoxaparin  1 mg/kg  LOIDAFLOWER KLINE  65y, Male  Allergy: ACE inhibitors (Hives)  carvedilol (Other)  enalapril (Hives)  Entresto (Other)      LOS  2d    CHIEF COMPLAINT: Septic arthritis (28 Apr 2021 07:07)      INTERVAL EVENTS/HPI  - No acute events overnight  - still with left knee pain - pain a bit more controlled today  - no nausea, vomiting, diarrhea, no pain by RUQ site  - T(F): , Max: 98.9 (04-27-21 @ 21:00)  - Denies any worsening symptoms  - Tolerating medication  - WBC Count: 15.15 (04-27-21 @ 06:14)  WBC Count: 16.84 (04-25-21 @ 22:15)     - Creatinine, Serum: 1.0 (04-27-21 @ 06:14)     ROS  General: Denies rigors, nightsweats  HEENT: Denies headache, rhinorrhea, sore throat, eye pain  CV: Denies CP, palpitations  PULM: Denies wheezing, hemoptysis  GI: Denies hematemesis, hematochezia, melena  : Denies discharge, hematuria  MSK: Denies arthralgias, myalgias  SKIN: Denies rash, lesions  NEURO: Denies paresthesias, weakness  PSYCH: Denies depression, anxiety    VITALS:  T(F): 98, Max: 98.9 (04-27-21 @ 21:00)  HR: 86  BP: 118/63  RR: 18Vital Signs Last 24 Hrs  T(C): 36.7 (28 Apr 2021 09:00), Max: 37.2 (27 Apr 2021 21:00)  T(F): 98 (28 Apr 2021 09:00), Max: 98.9 (27 Apr 2021 21:00)  HR: 86 (28 Apr 2021 09:00) (70 - 91)  BP: 118/63 (28 Apr 2021 09:00) (94/53 - 129/71)  BP(mean): --  RR: 18 (28 Apr 2021 09:00) (18 - 18)  SpO2: --    PHYSICAL EXAM:  Gen: NAD, resting in bed  HEENT: Normocephalic, atraumatic  Neck: supple, no lymphadenopathy  CV: Regular rate & regular rhythm  Lungs: decreased BS at bases, no fremitus  Abdomen: Soft, BS present  Ext: Warm, well perfused  Neuro: non focal, awake  Skin: no rash, no erythema  Lines: no phlebitis    FH: Non-contributory  Social Hx: Non-contributory    TESTS & MEASUREMENTS:                        9.2    15.15 )-----------( 275      ( 27 Apr 2021 06:14 )             30.3     04-27    133<L>  |  99  |  25<H>  ----------------------------<  214<H>  4.8   |  21  |  1.0    Ca    9.0      27 Apr 2021 06:14  Phos  2.9     04-27  Mg     2.0     04-27    TPro  6.8  /  Alb  3.5  /  TBili  1.2  /  DBili  0.4<H>  /  AST  22  /  ALT  32  /  AlkPhos  129<H>  04-27      LIVER FUNCTIONS - ( 27 Apr 2021 06:14 )  Alb: 3.5 g/dL / Pro: 6.8 g/dL / ALK PHOS: 129 U/L / ALT: 32 U/L / AST: 22 U/L / GGT: x               Culture - Body Fluid with Gram Stain (collected 04-26-21 @ 01:15)  Source: .Body Fluid Synovial Fluid  Gram Stain (04-26-21 @ 11:35):    polymorphonuclear leukocytes per low power field    No organisms seen per oil power field    by cytocentrifuge  Preliminary Report (04-28-21 @ 10:08):    Numerous Staphylococcus aureus  Organism: Staphylococcus aureus (04-28-21 @ 10:05)  Organism: Staphylococcus aureus (04-28-21 @ 10:05)      -  Ampicillin/Sulbactam: S <=8/4      -  Cefazolin: S <=4      -  Clindamycin: R <=0.25 This isolate is presumed to be clindamycin resistant based on detection of inducible resistance. Clindamycin may still be effective in some patients.      -  Erythromycin: R >4      -  Gentamicin: S <=1 Should not be used as monotherapy      -  Oxacillin: S <=0.25      -  Penicillin: R 4      -  RIF- Rifampin: S <=1 Should not be used as monotherapy      -  Tetra/Doxy: S <=1      -  Trimethoprim/Sulfamethoxazole: S <=0.5/9.5      -  Vancomycin: S 2      Method Type: YOLIE    Culture - Urine (collected 04-26-21 @ 00:31)  Source: .Urine Clean Catch (Midstream)  Final Report (04-27-21 @ 10:57):    <10,000 CFU/mL Normal Urogenital Tricia    Culture - Blood (collected 04-26-21 @ 00:31)  Source: .Blood Blood  Gram Stain (04-27-21 @ 01:48):    Growth in aerobic bottle: Gram Positive Cocci in Clusters  Preliminary Report (04-27-21 @ 20:25):    Growth in aerobic bottle: Staphylococcus aureus    ***Blood Panel PCR results on this specimen are available    approximately 3 hours after the Gram stain result.***    Gram stain, PCR, and/or culture results may not always    correspond due to difference in methodologies.    ************************************************************    This PCR assay was performed by multiplex PCR. This    Assay tests for 66 bacterial and resistance gene targets.    Please refer to the [facility] test directory    at [email or web address] for details.  Organism: Blood Culture PCR (04-27-21 @ 03:37)  Organism: Blood Culture PCR (04-27-21 @ 03:37)      -  Staphylococcus aureus: Detec Any isolate of Staphylococcus aureus from a blood culture is NOT considered a contaminant.      Method Type: PCR    Culture - Blood (collected 04-26-21 @ 00:31)  Source: .Blood Blood  Gram Stain (04-27-21 @ 12:53):    Growth in aerobic bottle: Gram Positive Cocci in Clusters    Growth in anaerobic bottle: Gram Positive Cocci in Clusters  Preliminary Report (04-27-21 @ 20:25):    Growth in aerobic bottle: Staphylococcus aureus    See previous culture [id number]    Growth in anaerobic bottle: Gram Positive Cocci in Clusters        Lactate, Blood: 1.4 mmol/L (04-25-21 @ 22:15)      INFECTIOUS DISEASES TESTING  COVID-19 PCR: NotDetec (04-26-21 @ 06:00)  COVID-19 PCR: NotDetec (03-12-21 @ 11:00)  COVID-19 PCR: Detected (02-05-21 @ 13:47)  COVID-19 PCR: NotDetec (10-13-20 @ 09:08)  COVID-19 PCR: NotDetec (10-03-20 @ 19:54)  COVID-19 PCR: NotDetec (09-01-20 @ 20:40)  COVID-19 PCR: NotDetec (07-30-20 @ 07:43)  COVID-19 PCR: NotDetec (07-16-20 @ 19:46)  COVID-19 PCR: NotDetec (05-17-20 @ 10:18)      INFLAMMATORY MARKERS  C-Reactive Protein, Serum: 108 mg/L (04-25-21 @ 22:15)  Sedimentation Rate, Erythrocyte: 102 mm/Hr (04-25-21 @ 22:15)  Sedimentation Rate, Erythrocyte: 60 mm/Hr (03-12-21 @ 17:00)  C-Reactive Protein, Serum: 7 mg/L (03-12-21 @ 17:00)      RADIOLOGY & ADDITIONAL TESTS:  I have personally reviewed the last available Chest xray  CXR      CT  CT Abdomen and Pelvis w/ IV Cont:   EXAM:  CT ABDOMEN AND PELVIS IC            PROCEDURE DATE:  04/26/2021            INTERPRETATION:  CLINICAL STATEMENT: Post pain. Drainage catheter removal. Right total knee arthroplasty status post explantation and antibiotic spacer placement.    TECHNIQUE: Contiguous axial CT images were obtained from the lower chest to the pubic symphysis following administration of 100cc Optiray 320 intravenous contrast.  Oral contrast was not administered.  Reformatted images in the coronal and sagittal planes were acquired.    Comparison made with CT abdomen and pelvis February 5, 2021.    FINDINGS:    LOWER CHEST: Pacemaker.    HEPATOBILIARY: Gallbladder not visualized. No biliary dilation. Liver unremarkable.    SPLEEN: Unremarkable.    PANCREAS: Unremarkable.    ADRENAL GLANDS: Unremarkable.    KIDNEYS: Unchanged mildly dilated left renal collecting systems. Nonobstructing left renal calculi measuring up to 0.4 cm. No right hydronephrosis.    ABDOMINOPELVIC NODES: Unremarkable.    PELVIC ORGANS: Unremarkable.    PERITONEUM/MESENTERY/BOWEL: Unremarkable.    BONES/SOFT TISSUES: Decreased area of right upper quadrant subcutaneous stranding at site of prior percutaneous cholecystostomy, without evidence of abscess. Right hip gamma nail fixationwith heterotrophic ossification. Degenerative change of lumbar spine.    OTHER: Vascular calcifications.      IMPRESSION:  Since February 5, 2021:    1. No evidence of acute intra-abdominal pathology.    2. Decreased area of right upper quadrant subcutaneous stranding at site of prior percutaneous cholecystostomy, without evidence of abscess.              MARIE CHASE MD; Attending Radiologist  This document has been electronically signed. Apr 26 2021  4:52AM (04-26-21 @ 04:42)      CARDIOLOGY TESTING  12 Lead ECG:   Ventricular Rate 91 BPM    Atrial Rate 91 BPM    P-R Interval 168 ms    QRS Duration 162 ms    Q-T Interval 380 ms    QTC Calculation(Bazett) 467 ms    P Axis 51 degrees    R Axis -27 degrees    T Axis 134 degrees    Diagnosis Line Atrial-sensed ventricular-paced rhythm  Abnormal ECG    Confirmed by Ze Beebe ([id number]) on 4/28/2021 7:36:41 AM (04-27-21 @ 22:06)      MEDICATIONS  aspirin enteric coated 81 Oral daily  atorvastatin 80 Oral at bedtime  chlorhexidine 4% Liquid 1 Topical <User Schedule>  digoxin     Tablet 125 Oral daily  DULoxetine 90 Oral daily  enoxaparin Injectable 40 SubCutaneous daily  insulin aspart (NovoLOG) Pump - Peds 1 SubCutaneous Continuous Pump  metoprolol succinate ER 25 Oral daily  nafcillin  IVPB 2 IV Intermittent every 4 hours  nafcillin  IVPB     pantoprazole    Tablet 40 Oral before breakfast  prasugrel 10 Oral daily  spironolactone 25 Oral daily      WEIGHT  Weight (kg): 81 (04-26-21 @ 17:23)      ANTIBIOTICS:  nafcillin  IVPB 2 Gram(s) IV Intermittent every 4 hours  nafcillin  IVPB          All available historical records have been reviewed       Subcutaneous Q24H [facility]IE [facility]      gabapentin  100 mg Oral Q8H Ashley Depadua, MD      lamoTRIgine  50 mg Oral BID Lorrie Barillas PA-C      levOCARNitine  1,000 mg/day Oral TID With Meals Lorrie Barillas PA-C      losartan  50 mg Oral Daily Lorrie Barillas PA-C      melatonin  6 mg Oral HS Lorrie Barillas PA-C      metoprolol succinate  25 mg Oral Daily CHARLIE Mcclelland      mirtazapine  15 mg Oral HS Lorrie Barillas PA-C      ondansetron  4 mg Oral Q6H PRN Lorrie Barillas PA-C      oxyCODONE  2.5 mg Oral Q8H PRN Raimundo Riley MD      polyethylene glycol  17 g Oral Daily PRN Lorrie Barillas PA-C      senna  1 tablet Oral HS PRN Lorrie Barillas PA-C      sertraline  75 mg Oral Daily Lorrie Barillas PA-C      tamsulosin  0.4 mg Oral Daily With Dinner Lorrie Barillas PA-C      warfarin  5 mg Oral Daily (warfarin) Lorrie Barillas PA-C      zonisamide  400 mg Oral Daily Lorrie Barillas PA-C         Subjective/ HPI: Patient seen and examined. Patients overnight issues or events were reviewed with nursing staff. New or overnight issues include the following:     Pt is doing well no complaints today he is about to eat lunch . No sob no chest pain no dizziness . Pain controlled and no concerns with bowels     ROS:   A 10 point ROS was performed; negative except as noted above.        *Labs /Radiology studies Reviewed  *Medications  reviewed and reconciled as needed  *Please refer to order section for additional ordered labs studies      Physical Examination:  Vitals:   Vitals:    07/29/24 1405 07/29/24 2032 07/30/24 0603 07/30/24 0819   BP: 137/63 130/62 101/56 121/72   BP Location: Left arm Left arm Right arm    Pulse: 88  88 89   Resp: 18 17 18    Temp: 98.8 °F (37.1 °C) 98.4 °F (36.9 °C) 97.7 °F (36.5 °C)    TempSrc: Oral Oral Oral    SpO2: 92%  97%    Weight:       Height:           General Appearance: NAD; pleasant  HEENT: WILL  conjuctiva normal; mucous membranes moist; face symmetrical  Neck:  Supple  Lungs: clear bilaterally, normal respiratory effort, no retractions, expiratory effort normal, on room air  CV: regular rate and rhythm, no murmurs rubs or gallops noted   ABD: soft non tender, +BS x4  EXT: DP pulses intact, no lymphadenopathy, no edema  Skin: normal turgor, normal texture, no rash  Psych: affect normal, mood normal  Neuro: AAOx3       The above physical exam was reviewed and updated as determined by my evaluation of the patient on 7/30/2024.    Invasive Devices       None                      VTE Pharmacologic Prophylaxis: Warfarin (Coumadin)  Code Status: Level 1 - Full Code  Current Length of Stay: 24 day(s)    Total floor / unit time spent today 20 minutes  Coordination of patient's care was performed in conjunction with consulting services. Time invested included review of patient's labs, vitals, and management of their comorbidities with continued monitoring, examination of patient as well as answering patient questions, documenting her findings and creating progress note in electronic medical record,  ordering appropriate diagnostic testing.       ** Please Note:  voice to text software may have been used in the creation of this document. Although proof errors in transcription or interpretation are a potential of such software**

## 2024-07-31 NOTE — PROGRESS NOTE ADULT - SUBJECTIVE AND OBJECTIVE BOX
INTERVAL HPI/OVERNIGHT EVENTS:  Again orthorstatic with first PT session yesterday.  This AM feeling well, pain controlled  No further diarrhea    MEDICATIONS  (STANDING):  acetaminophen   Tablet .. 650 milliGRAM(s) Oral every 6 hours  aspirin enteric coated 81 milliGRAM(s) Oral two times a day  atorvastatin 80 milliGRAM(s) Oral at bedtime  BUpivacaine liposome 1.3% Injectable (no eMAR) 20 milliLiter(s) Local Injection once  cefpodoxime 200 milliGRAM(s) Oral every 12 hours  celecoxib 100 milliGRAM(s) Oral two times a day  dextrose 10% Bolus 125 milliLiter(s) IV Bolus once  dextrose 10% Bolus 250 milliLiter(s) IV Bolus once  dextrose 5%. 1000 milliLiter(s) (50 mL/Hr) IV Continuous <Continuous>  dextrose 50% Injectable 12.5 Gram(s) IV Push once  dextrose 50% Injectable 25 Gram(s) IV Push once  dextrose 50% Injectable 25 Gram(s) IV Push once  DULoxetine 90 milliGRAM(s) Oral daily  insulin aspart (NovoLOG) Pump 1 Each SubCutaneous Continuous Pump  metoprolol succinate  milliGRAM(s) Oral <User Schedule>  omega-3-Acid Ethyl Esters 4 Gram(s) Oral daily  pantoprazole    Tablet 40 milliGRAM(s) Oral before breakfast  polyethylene glycol 3350 17 Gram(s) Oral daily  saccharomyces boulardii 250 milliGRAM(s) Oral two times a day    MEDICATIONS  (PRN):  aluminum hydroxide/magnesium hydroxide/simethicone Suspension 30 milliLiter(s) Oral four times a day PRN Indigestion  bisacodyl Suppository 10 milliGRAM(s) Rectal daily PRN If no bowel movement by postoperative day #2  dextrose 40% Gel 15 Gram(s) Oral once PRN Blood Glucose LESS THAN 70 milliGRAM(s)/deciliter  glucagon  Injectable 1 milliGRAM(s) IntraMuscular once PRN Glucose LESS THAN 70 milligrams/deciliter  HYDROmorphone  Injectable 0.5 milliGRAM(s) IV Push every 4 hours PRN Breakthrough Pain  HYDROmorphone  Injectable 0.5 milliGRAM(s) IV Push every 30 minutes PRN Breakthrough Pain  morphine  - Injectable 2 milliGRAM(s) IV Push every 4 hours PRN Severe Pain (7 - 10)  ondansetron Injectable 4 milliGRAM(s) IV Push every 6 hours PRN Nausea and/or Vomiting  oxyCODONE    IR 5 milliGRAM(s) Oral every 4 hours PRN Mild Pain (1 - 3)  oxyCODONE    IR 10 milliGRAM(s) Oral every 4 hours PRN Moderate Pain (4 - 6)  senna 2 Tablet(s) Oral at bedtime PRN Constipation      Allergies    ACE inhibitors (Hives)  enalapril (Hives)          REVIEW OF SYSTEMS:  CONSTITUTIONAL:  No night sweats.  No fatigue,  No fever or chills.  HEENT:  Eyes:  No visual changes.  No eye pain.  .    ENT:    No sinus pain.  No sore throat.  No odynophagia.  .  RESPIRATORY:  No cough.  No wheeze.    No shortness of breath.  CARDIOVASCULAR:  No chest pains.  No palpitations.  GASTROINTESTINAL:  No abdominal pain.  No nausea or vomiting.  no diarrhea  GENITOURINARY:  No urgency.  No frequency.  No dysuria.  No hematuria.    MUSCULOSKELETAL:  right knee pain present   SKIN:  No rashes.  No lesions.     T(C): 36.7 (10-24-19 @ 05:24), Max: 37.2 (10-23-19 @ 16:44)  HR: 83 (10-24-19 @ 00:54) (71 - 92)  BP: 120/69 (10-24-19 @ 00:54) (89/47 - 144/74)  RR: 16 (10-24-19 @ 00:54) (16 - 18)  SpO2: 96% (10-24-19 @ 00:54) (94% - 97%)  Wt(kg): --    PHYSICAL EXAM:   General - NAD, Alert and oriented x 3,   Eyes - PERRLA, EOM intact  Neck - - No lymphadenopathy  Cardiovascular - RRR no m/r/g, no JVD  Lungs - Clear to auscultation, no use of accessory muscles, no crackles or wheezes.  Skin - No rashes, skin warm and dry  Abdomen - Normal bowel sounds, abdomen soft and nontender  Extremeties - No edema, cyanosis or clubbing  Neurological – no focal deficits    LABS:                        11.7   13.49 )-----------( 166      ( 23 Oct 2019 15:13 )             37.9     10-23    138  |  103  |  17  ----------------------------<  259<H>  4.9   |  25  |  0.94    Ca    9.2      23 Oct 2019 15:13            RADIOLOGY & ADDITIONAL TESTS: 22.6

## 2024-08-28 NOTE — PATIENT PROFILE ADULT - TRANSPORTATION
no Tranexamic Acid Pregnancy And Lactation Text: It is unknown if this medication is safe during pregnancy or breast feeding. Enbrel Counseling:  I discussed with the patient the risks of etanercept including but not limited to myelosuppression, immunosuppression, autoimmune hepatitis, demyelinating diseases, lymphoma, and infections.  The patient understands that monitoring is required including a PPD at baseline and must alert us or the primary physician if symptoms of infection or other concerning signs are noted. Spironolactone Pregnancy And Lactation Text: This medication can cause feminization of the male fetus and should be avoided during pregnancy. The active metabolite is also found in breast milk. Doxycycline Pregnancy And Lactation Text: This medication is Pregnancy Category D and not consider safe during pregnancy. It is also excreted in breast milk but is considered safe for shorter treatment courses. Klisyri Counseling:  I discussed with the patient the risks of Klisyri including but not limited to erythema, scaling, itching, weeping, crusting, and pain. Topical Metronidazole Counseling: Metronidazole is a topical antibiotic medication. You may experience burning, stinging, redness, or allergic reactions.  Please call our office if you develop any problems from using this medication. Niacinamide Counseling: I recommended taking niacin or niacinamide, also know as vitamin B3, twice daily. Recent evidence suggests that taking vitamin B3 (500 mg twice daily) can reduce the risk of actinic keratoses and non-melanoma skin cancers. Side effects of vitamin B3 include flushing and headache. Wartpeel Pregnancy And Lactation Text: This medication is Pregnancy Category X and contraindicated in pregnancy and in women who may become pregnant. It is unknown if this medication is excreted in breast milk. Tremfya Pregnancy And Lactation Text: The risk during pregnancy and breastfeeding is uncertain with this medication. Include Pregnancy/Lactation Warning?: No Doxepin Counseling:  Patient advised that the medication is sedating and not to drive a car after taking this medication. Patient informed of potential adverse effects including but not limited to dry mouth, urinary retention, and blurry vision.  The patient verbalized understanding of the proper use and possible adverse effects of doxepin.  All of the patient's questions and concerns were addressed. Griseofulvin Pregnancy And Lactation Text: This medication is Pregnancy Category X and is known to cause serious birth defects. It is unknown if this medication is excreted in breast milk but breast feeding should be avoided. Drysol Counseling:  I discussed with the patient the risks of drysol/aluminum chloride including but not limited to skin rash, itching, irritation, burning. Benzoyl Peroxide Counseling: Patient counseled that medicine may cause skin irritation and bleach clothing.  In the event of skin irritation, the patient was advised to reduce the amount of the drug applied or use it less frequently.   The patient verbalized understanding of the proper use and possible adverse effects of benzoyl peroxide.  All of the patient's questions and concerns were addressed. Picato Pregnancy And Lactation Text: This medication is Pregnancy Category C. It is unknown if this medication is excreted in breast milk. Drysol Pregnancy And Lactation Text: This medication is considered safe during pregnancy and breast feeding. Itraconazole Counseling:  I discussed with the patient the risks of itraconazole including but not limited to liver damage, nausea/vomiting, neuropathy, and severe allergy.  The patient understands that this medication is best absorbed when taken with acidic beverages such as non-diet cola or ginger ale.  The patient understands that monitoring is required including baseline LFTs and repeat LFTs at intervals.  The patient understands that they are to contact us or the primary physician if concerning signs are noted. Rifampin Pregnancy And Lactation Text: This medication is Pregnancy Category C and it isn't know if it is safe during pregnancy. It is also excreted in breast milk and should not be used if you are breast feeding. Olumiant Pregnancy And Lactation Text: Based on animal studies, Olumiant may cause embryo-fetal harm when administered to pregnant women.  The medication should not be used in pregnancy.  Breastfeeding is not recommended during treatment. Protopic Counseling: Patient may experience a mild burning sensation during topical application. Protopic is not approved in children less than 2 years of age. There have been case reports of hematologic and skin malignancies in patients using topical calcineurin inhibitors although causality is questionable. Oxybutynin Counseling:  I discussed with the patient the risks of oxybutynin including but not limited to skin rash, drowsiness, dry mouth, difficulty urinating, and blurred vision. Dutasteride Male Counseling: Dustasteride Counseling:  I discussed with the patient the risks of use of dutasteride including but not limited to decreased libido, decreased ejaculate volume, and gynecomastia. Women who can become pregnant should not handle medication.  All of the patient's questions and concerns were addressed. Soolantra Pregnancy And Lactation Text: This medication is Pregnancy Category C. This medication is considered safe during breast feeding. Dapsone Pregnancy And Lactation Text: This medication is Pregnancy Category C and is not considered safe during pregnancy or breast feeding. Isotretinoin Counseling: Patient should get monthly blood tests, not donate blood, not drive at night if vision affected, not share medication, and not undergo elective surgery for 6 months after tx completed. Side effects reviewed, pt to contact office should one occur. Skyrizi Counseling: I discussed with the patient the risks of risankizumab-rzaa including but not limited to immunosuppression, and serious infections.  The patient understands that monitoring is required including a PPD at baseline and must alert us or the primary physician if symptoms of infection or other concerning signs are noted. Adbry Pregnancy And Lactation Text: It is unknown if this medication will adversely affect pregnancy or breast feeding. Methotrexate Counseling:  Patient counseled regarding adverse effects of methotrexate including but not limited to nausea, vomiting, abnormalities in liver function tests. Patients may develop mouth sores, rash, diarrhea, and abnormalities in blood counts. The patient understands that monitoring is required including LFT's and blood counts.  There is a rare possibility of scarring of the liver and lung problems that can occur when taking methotrexate. Persistent nausea, loss of appetite, pale stools, dark urine, cough, and shortness of breath should be reported immediately. Patient advised to discontinue methotrexate treatment at least three months before attempting to become pregnant.  I discussed the need for folate supplements while taking methotrexate.  These supplements can decrease side effects during methotrexate treatment. The patient verbalized understanding of the proper use and possible adverse effects of methotrexate.  All of the patient's questions and concerns were addressed. Azithromycin Counseling:  I discussed with the patient the risks of azithromycin including but not limited to GI upset, allergic reaction, drug rash, diarrhea, and yeast infections. Opioid Counseling: I discussed with the patient the potential side effects of opioids including but not limited to addiction, altered mental status, and depression. I stressed avoiding alcohol, benzodiazepines, muscle relaxants and sleep aids unless specifically okayed by a physician. The patient verbalized understanding of the proper use and possible adverse effects of opioids. All of the patient's questions and concerns were addressed. They were instructed to flush the remaining pills down the toilet if they did not need them for pain. Enbrel Pregnancy And Lactation Text: This medication is Pregnancy Category B and is considered safe during pregnancy. It is unknown if this medication is excreted in breast milk. Isotretinoin Pregnancy And Lactation Text: This medication is Pregnancy Category X and is considered extremely dangerous during pregnancy. It is unknown if it is excreted in breast milk. Klisyri Pregnancy And Lactation Text: It is unknown if this medication can harm a developing fetus or if it is excreted in breast milk. Opioid Pregnancy And Lactation Text: These medications can lead to premature delivery and should be avoided during pregnancy. These medications are also present in breast milk in small amounts. Topical Retinoid counseling:  Patient advised to apply a pea-sized amount only at bedtime and wait 30 minutes after washing their face before applying.  If too drying, patient may add a non-comedogenic moisturizer. The patient verbalized understanding of the proper use and possible adverse effects of retinoids.  All of the patient's questions and concerns were addressed. Gabapentin Counseling: I discussed with the patient the risks of gabapentin including but not limited to dizziness, somnolence, fatigue and ataxia. Topical Metronidazole Pregnancy And Lactation Text: This medication is Pregnancy Category B and considered safe during pregnancy.  It is also considered safe to use while breastfeeding. Azithromycin Pregnancy And Lactation Text: This medication is considered safe during pregnancy and is also secreted in breast milk. Valtrex Counseling: I discussed with the patient the risks of valacyclovir including but not limited to kidney damage, nausea, vomiting and severe allergy.  The patient understands that if the infection seems to be worsening or is not improving, they are to call. Doxepin Pregnancy And Lactation Text: This medication is Pregnancy Category C and it isn't known if it is safe during pregnancy. It is also excreted in breast milk and breast feeding isn't recommended. Niacinamide Pregnancy And Lactation Text: These medications are considered safe during pregnancy. Winlevi Counseling:  I discussed with the patient the risks of topical clascoterone including but not limited to erythema, scaling, itching, and stinging. Patient voiced their understanding. Xolair Counseling:  Patient informed of potential adverse effects including but not limited to fever, muscle aches, rash and allergic reactions.  The patient verbalized understanding of the proper use and possible adverse effects of Xolair.  All of the patient's questions and concerns were addressed. Benzoyl Peroxide Pregnancy And Lactation Text: This medication is Pregnancy Category C. It is unknown if benzoyl peroxide is excreted in breast milk. Erythromycin Counseling:  I discussed with the patient the risks of erythromycin including but not limited to GI upset, allergic reaction, drug rash, diarrhea, increase in liver enzymes, and yeast infections. Itraconazole Pregnancy And Lactation Text: This medication is Pregnancy Category C and it isn't know if it is safe during pregnancy. It is also excreted in breast milk. Infliximab Counseling:  I discussed with the patient the risks of infliximab including but not limited to myelosuppression, immunosuppression, autoimmune hepatitis, demyelinating diseases, lymphoma, and serious infections.  The patient understands that monitoring is required including a PPD at baseline and must alert us or the primary physician if symptoms of infection or other concerning signs are noted. Protopic Pregnancy And Lactation Text: This medication is Pregnancy Category C. It is unknown if this medication is excreted in breast milk when applied topically. Nsaids Counseling: NSAID Counseling: I discussed with the patient that NSAIDs should be taken with food. Prolonged use of NSAIDs can result in the development of stomach ulcers.  Patient advised to stop taking NSAIDs if abdominal pain occurs.  The patient verbalized understanding of the proper use and possible adverse effects of NSAIDs.  All of the patient's questions and concerns were addressed. Erythromycin Pregnancy And Lactation Text: This medication is Pregnancy Category B and is considered safe during pregnancy. It is also excreted in breast milk. Hydroxyzine Counseling: Patient advised that the medication is sedating and not to drive a car after taking this medication.  Patient informed of potential adverse effects including but not limited to dry mouth, urinary retention, and blurry vision.  The patient verbalized understanding of the proper use and possible adverse effects of hydroxyzine.  All of the patient's questions and concerns were addressed. Sarecycline Counseling: Patient advised regarding possible photosensitivity and discoloration of the teeth, skin, lips, tongue and gums.  Patient instructed to avoid sunlight, if possible.  When exposed to sunlight, patients should wear protective clothing, sunglasses, and sunscreen.  The patient was instructed to call the office immediately if the following severe adverse effects occur:  hearing changes, easy bruising/bleeding, severe headache, or vision changes.  The patient verbalized understanding of the proper use and possible adverse effects of sarecycline.  All of the patient's questions and concerns were addressed. Bimzelx Counseling:  I discussed with the patient the risks of Bimzelx including but not limited to depression, immunosuppression, allergic reactions and infections.  The patient understands that monitoring is required including a PPD at baseline and must alert us or the primary physician if symptoms of infection or other concerning signs are noted. Dutasteride Female Counseling: Dutasteride Counseling:  I discussed with the patient the risks of use of dutasteride including but not limited to decreased libido and sexual dysfunction. Explained the teratogenic nature of the medication and stressed the importance of not getting pregnant during treatment. All of the patient's questions and concerns were addressed. Rinvoq Counseling: I discussed with the patient the risks of Rinvoq therapy including but not limited to upper respiratory tract infections, shingles, cold sores, bronchitis, nausea, cough, fever, acne, and headache. Live vaccines should be avoided.  This medication has been linked to serious infections; higher rate of mortality; malignancy and lymphoproliferative disorders; major adverse cardiovascular events; thrombosis; thrombocytopenia, anemia, and neutropenia; lipid elevations; liver enzyme elevations; and gastrointestinal perforations. Azathioprine Counseling:  I discussed with the patient the risks of azathioprine including but not limited to myelosuppression, immunosuppression, hepatotoxicity, lymphoma, and infections.  The patient understands that monitoring is required including baseline LFTs, Creatinine, possible TPMP genotyping and weekly CBCs for the first month and then every 2 weeks thereafter.  The patient verbalized understanding of the proper use and possible adverse effects of azathioprine.  All of the patient's questions and concerns were addressed. Oxybutynin Pregnancy And Lactation Text: This medication is Pregnancy Category B and is considered safe during pregnancy. It is unknown if it is excreted in breast milk. Elidel Counseling: Patient may experience a mild burning sensation during topical application. Elidel is not approved in children less than 2 years of age. There have been case reports of hematologic and skin malignancies in patients using topical calcineurin inhibitors although causality is questionable. Methotrexate Pregnancy And Lactation Text: This medication is Pregnancy Category X and is known to cause fetal harm. This medication is excreted in breast milk. Propranolol Counseling:  I discussed with the patient the risks of propranolol including but not limited to low heart rate, low blood pressure, low blood sugar, restlessness and increased cold sensitivity. They should call the office if they experience any of these side effects. Prednisone Counseling:  I discussed with the patient the risks of prolonged use of prednisone including but not limited to weight gain, insomnia, osteoporosis, mood changes, diabetes, susceptibility to infection, glaucoma and high blood pressure.  In cases where prednisone use is prolonged, patients should be monitored with blood pressure checks, serum glucose levels and an eye exam.  Additionally, the patient may need to be placed on GI prophylaxis, PCP prophylaxis, and calcium and vitamin D supplementation and/or a bisphosphonate.  The patient verbalized understanding of the proper use and the possible adverse effects of prednisone.  All of the patient's questions and concerns were addressed. Dutasteride Pregnancy And Lactation Text: This medication is absolutely contraindicated in women, especially during pregnancy and breast feeding. Feminization of male fetuses is possible if taking while pregnant. Bactrim Counseling:  I discussed with the patient the risks of sulfa antibiotics including but not limited to GI upset, allergic reaction, drug rash, diarrhea, dizziness, photosensitivity, and yeast infections.  Rarely, more serious reactions can occur including but not limited to aplastic anemia, agranulocytosis, methemoglobinemia, blood dyscrasias, liver or kidney failure, lung infiltrates or desquamative/blistering drug rashes. Rinvoq Pregnancy And Lactation Text: Based on animal studies, Rinvoq may cause embryo-fetal harm when administered to pregnant women.  The medication should not be used in pregnancy.  Breastfeeding is not recommended during treatment and for 6 days after the last dose. Gabapentin Pregnancy And Lactation Text: This medication is Pregnancy Category C and isn't considered safe during pregnancy. It is excreted in breast milk. Bimzelx Pregnancy And Lactation Text: This medication crosses the placenta and the safety is uncertain during pregnancy. It is unknown if this medication is present in breast milk. Spevigo Counseling: I discussed with the patient the risks of Spevigo including but not limited to fatigue, nasuea, vomiting, headache, pruritus, urinary tract infection, an infusion related reactions.  The patient understands that monitoring is required including screening for tuberculosis at baseline and yearly screening thereafter while continuing Spevigo therapy. They should contact us if symptoms of infection or other concerning signs are noted. Winlevi Pregnancy And Lactation Text: This medication is considered safe during pregnancy and breastfeeding. Humira Counseling:  I discussed with the patient the risks of adalimumab including but not limited to myelosuppression, immunosuppression, autoimmune hepatitis, demyelinating diseases, lymphoma, and serious infections.  The patient understands that monitoring is required including a PPD at baseline and must alert us or the primary physician if symptoms of infection or other concerning signs are noted. Carac Counseling:  I discussed with the patient the risks of Carac including but not limited to erythema, scaling, itching, weeping, crusting, and pain. Xolair Pregnancy And Lactation Text: This medication is Pregnancy Category B and is considered safe during pregnancy. This medication is excreted in breast milk. High Dose Vitamin A Counseling: Side effects reviewed, pt to contact office should one occur. Valtrex Pregnancy And Lactation Text: this medication is Pregnancy Category B and is considered safe during pregnancy. This medication is not directly found in breast milk but it's metabolite acyclovir is present. Minoxidil Counseling: Minoxidil is a topical medication which can increase blood flow where it is applied. It is uncertain how this medication increases hair growth. Side effects are uncommon and include stinging and allergic reactions. Topical Steroids Counseling: I discussed with the patient that prolonged use of topical steroids can result in the increased appearance of superficial blood vessels (telangiectasias), lightening (hypopigmentation) and thinning of the skin (atrophy).  Patient understands to avoid using high potency steroids in skin folds, the groin or the face.  The patient verbalized understanding of the proper use and possible adverse effects of topical steroids.  All of the patient's questions and concerns were addressed. Topical Steroids Applications Pregnancy And Lactation Text: Most topical steroids are considered safe to use during pregnancy and lactation.  Any topical steroid applied to the breast or nipple should be washed off before breastfeeding. Hydroxyzine Pregnancy And Lactation Text: This medication is not safe during pregnancy and should not be taken. It is also excreted in breast milk and breast feeding isn't recommended. Nsaids Pregnancy And Lactation Text: These medications are considered safe up to 30 weeks gestation. It is excreted in breast milk. Metronidazole Counseling:  I discussed with the patient the risks of metronidazole including but not limited to seizures, nausea/vomiting, a metallic taste in the mouth, nausea/vomiting and severe allergy. Minoxidil Pregnancy And Lactation Text: This medication has not been assigned a Pregnancy Risk Category but animal studies failed to show danger with the topical medication. It is unknown if the medication is excreted in breast milk. Azathioprine Pregnancy And Lactation Text: This medication is Pregnancy Category D and isn't considered safe during pregnancy. It is unknown if this medication is excreted in breast milk. Arava Counseling:  Patient counseled regarding adverse effects of Arava including but not limited to nausea, vomiting, abnormalities in liver function tests. Patients may develop mouth sores, rash, diarrhea, and abnormalities in blood counts. The patient understands that monitoring is required including LFTs and blood counts.  There is a rare possibility of scarring of the liver and lung problems that can occur when taking methotrexate. Persistent nausea, loss of appetite, pale stools, dark urine, cough, and shortness of breath should be reported immediately. Patient advised to discontinue Arava treatment and consult with a physician prior to attempting conception. The patient will have to undergo a treatment to eliminate Arava from the body prior to conception. Ketoconazole Counseling:   Patient counseled regarding improving absorption with orange juice.  Adverse effects include but are not limited to breast enlargement, headache, diarrhea, nausea, upset stomach, liver function test abnormalities, taste disturbance, and stomach pain.  There is a rare possibility of liver failure that can occur when taking ketoconazole. The patient understands that monitoring of LFTs may be required, especially at baseline. The patient verbalized understanding of the proper use and possible adverse effects of ketoconazole.  All of the patient's questions and concerns were addressed. Sarecycline Pregnancy And Lactation Text: This medication is Pregnancy Category D and not consider safe during pregnancy. It is also excreted in breast milk. Qbrexza Counseling:  I discussed with the patient the risks of Qbrexza including but not limited to headache, mydriasis, blurred vision, dry eyes, nasal dryness, dry mouth, dry throat, dry skin, urinary hesitation, and constipation.  Local skin reactions including erythema, burning, stinging, and itching can also occur. Eucrisa Counseling: Patient may experience a mild burning sensation during topical application. Eucrisa is not approved in children less than 3 months of age. Arava Pregnancy And Lactation Text: This medication is Pregnancy Category X and is absolutely contraindicated during pregnancy. It is unknown if it is excreted in breast milk. Erivedge Counseling- I discussed with the patient the risks of Erivedge including but not limited to nausea, vomiting, diarrhea, constipation, weight loss, changes in the sense of taste, decreased appetite, muscle spasms, and hair loss.  The patient verbalized understanding of the proper use and possible adverse effects of Erivedge.  All of the patient's questions and concerns were addressed. Sotyktu Counseling:  I discussed the most common side effects of Sotyktu including: common cold, sore throat, sinus infections, cold sores, canker sores, folliculitis, and acne.  I also discussed more serious side effects of Sotyktu including but not limited to: serious allergic reactions; increased risk for infections such as TB; cancers such as lymphomas; rhabdomyolysis and elevated CPK; and elevated triglycerides and liver enzymes.  Propranolol Pregnancy And Lactation Text: This medication is Pregnancy Category C and it isn't known if it is safe during pregnancy. It is excreted in breast milk. Tazorac Counseling:  Patient advised that medication is irritating and drying.  Patient may need to apply sparingly and wash off after an hour before eventually leaving it on overnight.  The patient verbalized understanding of the proper use and possible adverse effects of tazorac.  All of the patient's questions and concerns were addressed. Tetracycline Counseling: Patient counseled regarding possible photosensitivity and increased risk for sunburn.  Patient instructed to avoid sunlight, if possible.  When exposed to sunlight, patients should wear protective clothing, sunglasses, and sunscreen.  The patient was instructed to call the office immediately if the following severe adverse effects occur:  hearing changes, easy bruising/bleeding, severe headache, or vision changes.  The patient verbalized understanding of the proper use and possible adverse effects of tetracycline.  All of the patient's questions and concerns were addressed. Patient understands to avoid pregnancy while on therapy due to potential birth defects. Finasteride Male Counseling: Finasteride Counseling:  I discussed with the patient the risks of use of finasteride including but not limited to decreased libido, decreased ejaculate volume, gynecomastia, and depression. Women should not handle medication.  All of the patient's questions and concerns were addressed. Glycopyrrolate Counseling:  I discussed with the patient the risks of glycopyrrolate including but not limited to skin rash, drowsiness, dry mouth, difficulty urinating, and blurred vision. Prednisone Pregnancy And Lactation Text: This medication is Pregnancy Category C and it isn't know if it is safe during pregnancy. This medication is excreted in breast milk. Spevigo Pregnancy And Lactation Text: The risk during pregnancy and breastfeeding is uncertain with this medication. This medication does cross the placenta. It is unknown if this medication is found in breast milk. VTAMA Counseling: I discussed with the patient that VTAMA is not for use in the eyes, mouth or mouth. They should call the office if they develop any signs of allergic reactions to VTAMA. The patient verbalized understanding of the proper use and possible adverse effects of VTAMA.  All of the patient's questions and concerns were addressed. High Dose Vitamin A Pregnancy And Lactation Text: High dose vitamin A therapy is contraindicated during pregnancy and breast feeding. Cimzia Counseling:  I discussed with the patient the risks of Cimzia including but not limited to immunosuppression, allergic reactions and infections.  The patient understands that monitoring is required including a PPD at baseline and must alert us or the primary physician if symptoms of infection or other concerning signs are noted. Bactrim Pregnancy And Lactation Text: This medication is Pregnancy Category D and is known to cause fetal risk.  It is also excreted in breast milk. Hyrimoz Counseling:  I discussed with the patient the risks of adalimumab including but not limited to myelosuppression, immunosuppression, autoimmune hepatitis, demyelinating diseases, lymphoma, and serious infections.  The patient understands that monitoring is required including a PPD at baseline and must alert us or the primary physician if symptoms of infection or other concerning signs are noted. Glycopyrrolate Pregnancy And Lactation Text: This medication is Pregnancy Category B and is considered safe during pregnancy. It is unknown if it is excreted breast milk. Mirvaso Counseling: Mirvaso is a topical medication which can decrease superficial blood flow where applied. Side effects are uncommon and include stinging, redness and allergic reactions. Metronidazole Pregnancy And Lactation Text: This medication is Pregnancy Category B and considered safe during pregnancy.  It is also excreted in breast milk. Albendazole Counseling:  I discussed with the patient the risks of albendazole including but not limited to cytopenia, kidney damage, nausea/vomiting and severe allergy.  The patient understands that this medication is being used in an off-label manner. Topical Sulfur Applications Counseling: Topical Sulfur Counseling: Patient counseled that this medication may cause skin irritation or allergic reactions.  In the event of skin irritation, the patient was advised to reduce the amount of the drug applied or use it less frequently.   The patient verbalized understanding of the proper use and possible adverse effects of topical sulfur application.  All of the patient's questions and concerns were addressed. Vtama Pregnancy And Lactation Text: It is unknown if this medication can cause problems during pregnancy and breastfeeding. Ketoconazole Pregnancy And Lactation Text: This medication is Pregnancy Category C and it isn't know if it is safe during pregnancy. It is also excreted in breast milk and breast feeding isn't recommended. Rituxan Counseling:  I discussed with the patient the risks of Rituxan infusions. Side effects can include infusion reactions, severe drug rashes including mucocutaneous reactions, reactivation of latent hepatitis and other infections and rarely progressive multifocal leukoencephalopathy.  All of the patient's questions and concerns were addressed. Olanzapine Counseling- I discussed with the patient the common side effects of olanzapine including but are not limited to: lack of energy, dry mouth, increased appetite, sleepiness, tremor, constipation, dizziness, changes in behavior, or restlessness.  Explained that teenagers are more likely to experience headaches, abdominal pain, pain in the arms or legs, tiredness, and sleepiness.  Serious side effects include but are not limited: increased risk of death in elderly patients who are confused, have memory loss, or dementia-related psychosis; hyperglycemia; increased cholesterol and triglycerides; and weight gain. Calcipotriene Counseling:  I discussed with the patient the risks of calcipotriene including but not limited to erythema, scaling, itching, and irritation. Qbrexza Pregnancy And Lactation Text: There is no available data on Qbrexza use in pregnant women.  There is no available data on Qbrexza use in lactation. Cellcept Counseling:  I discussed with the patient the risks of mycophenolate mofetil including but not limited to infection/immunosuppression, GI upset, hypokalemia, hypercholesterolemia, bone marrow suppression, lymphoproliferative disorders, malignancy, GI ulceration/bleed/perforation, colitis, interstitial lung disease, kidney failure, progressive multifocal leukoencephalopathy, and birth defects.  The patient understands that monitoring is required including a baseline creatinine and regular CBC testing. In addition, patient must alert us immediately if symptoms of infection or other concerning signs are noted. Rituxan Pregnancy And Lactation Text: This medication is Pregnancy Category C and it isn't know if it is safe during pregnancy. It is unknown if this medication is excreted in breast milk but similar antibodies are known to be excreted. Terbinafine Counseling: Patient counseling regarding adverse effects of terbinafine including but not limited to headache, diarrhea, rash, upset stomach, liver function test abnormalities, itching, taste/smell disturbance, nausea, abdominal pain, and flatulence.  There is a rare possibility of liver failure that can occur when taking terbinafine.  The patient understands that a baseline LFT and kidney function test may be required. The patient verbalized understanding of the proper use and possible adverse effects of terbinafine.  All of the patient's questions and concerns were addressed. Cibinqo Counseling: I discussed with the patient the risks of Cibinqo therapy including but not limited to common cold, nausea, headache, cold sores, increased blood CPK levels, dizziness, UTIs, fatigue, acne, and vomitting. Live vaccines should be avoided.  This medication has been linked to serious infections; higher rate of mortality; malignancy and lymphoproliferative disorders; major adverse cardiovascular events; thrombosis; thrombocytopenia and lymphopenia; lipid elevations; and retinal detachment. Olanzapine Pregnancy And Lactation Text: This medication is pregnancy category C.   There are no adequate and well controlled trials with olanzapine in pregnant females.  Olanzapine should be used during pregnancy only if the potential benefit justifies the potential risk to the fetus.   In a study in lactating healthy women, olanzapine was excreted in breast milk.  It is recommended that women taking olanzapine should not breast feed. Rhofade Counseling: Rhofade is a topical medication which can decrease superficial blood flow where applied. Side effects are uncommon and include stinging, redness and allergic reactions. Finasteride Female Counseling: Finasteride Counseling:  I discussed with the patient the risks of use of finasteride including but not limited to decreased libido and sexual dysfunction. Explained the teratogenic nature of the medication and stressed the importance of not getting pregnant during treatment. All of the patient's questions and concerns were addressed. Clofazimine Counseling:  I discussed with the patient the risks of clofazimine including but not limited to skin and eye pigmentation, liver damage, nausea/vomiting, gastrointestinal bleeding and allergy. Tazorac Pregnancy And Lactation Text: This medication is not safe during pregnancy. It is unknown if this medication is excreted in breast milk. Cimzia Pregnancy And Lactation Text: This medication crosses the placenta but can be considered safe in certain situations. Cimzia may be excreted in breast milk. Stelara Counseling:  I discussed with the patient the risks of ustekinumab including but not limited to immunosuppression, malignancy, posterior leukoencephalopathy syndrome, and serious infections.  The patient understands that monitoring is required including a PPD at baseline and must alert us or the primary physician if symptoms of infection or other concerning signs are noted. Sotyktu Pregnancy And Lactation Text: There is insufficient data to evaluate whether or not Sotyktu is safe to use during pregnancy.   It is not known if Sotyktu passes into breast milk and whether or not it is safe to use when breastfeeding.   SSKI Counseling:  I discussed with the patient the risks of SSKI including but not limited to thyroid abnormalities, metallic taste, GI upset, fever, headache, acne, arthralgias, paraesthesias, lymphadenopathy, easy bleeding, arrhythmias, and allergic reaction. Cephalexin Counseling: I counseled the patient regarding use of cephalexin as an antibiotic for prophylactic and/or therapeutic purposes. Cephalexin (commonly prescribed under brand name Keflex) is a cephalosporin antibiotic which is active against numerous classes of bacteria, including most skin bacteria. Side effects may include nausea, diarrhea, gastrointestinal upset, rash, hives, yeast infections, and in rare cases, hepatitis, kidney disease, seizures, fever, confusion, neurologic symptoms, and others. Patients with severe allergies to penicillin medications are cautioned that there is about a 10% incidence of cross-reactivity with cephalosporins. When possible, patients with penicillin allergies should use alternatives to cephalosporins for antibiotic therapy. Mirvaso Pregnancy And Lactation Text: This medication has not been assigned a Pregnancy Risk Category. It is unknown if the medication is excreted in breast milk. Aklief counseling:  Patient advised to apply a pea-sized amount only at bedtime and wait 30 minutes after washing their face before applying.  If too drying, patient may add a non-comedogenic moisturizer.  The most commonly reported side effects including irritation, redness, scaling, dryness, stinging, burning, itching, and increased risk of sunburn.  The patient verbalized understanding of the proper use and possible adverse effects of retinoids.  All of the patient's questions and concerns were addressed. Libtayo Counseling- I discussed with the patient the risks of Libtayo including but not limited to nausea, vomiting, diarrhea, and bone or muscle pain.  The patient verbalized understanding of the proper use and possible adverse effects of Libtayo.  All of the patient's questions and concerns were addressed. Albendazole Pregnancy And Lactation Text: This medication is Pregnancy Category C and it isn't known if it is safe during pregnancy. It is also excreted in breast milk. Topical Clindamycin Counseling: Patient counseled that this medication may cause skin irritation or allergic reactions.  In the event of skin irritation, the patient was advised to reduce the amount of the drug applied or use it less frequently.   The patient verbalized understanding of the proper use and possible adverse effects of clindamycin.  All of the patient's questions and concerns were addressed. Xeljanz Counseling: I discussed with the patient the risks of Xeljanz therapy including increased risk of infection, liver issues, headache, diarrhea, or cold symptoms. Live vaccines should be avoided. They were instructed to call if they have any problems. Hydroquinone Counseling:  Patient advised that medication may result in skin irritation, lightening (hypopigmentation), dryness, and burning.  In the event of skin irritation, the patient was advised to reduce the amount of the drug applied or use it less frequently.  Rarely, spots that are treated with hydroquinone can become darker (pseudoochronosis).  Should this occur, patient instructed to stop medication and call the office. The patient verbalized understanding of the proper use and possible adverse effects of hydroquinone.  All of the patient's questions and concerns were addressed. Cephalexin Pregnancy And Lactation Text: This medication is Pregnancy Category B and considered safe during pregnancy.  It is also excreted in breast milk but can be used safely for shorter doses. Hydroxychloroquine Counseling:  I discussed with the patient that a baseline ophthalmologic exam is needed at the start of therapy and every year thereafter while on therapy. A CBC may also be warranted for monitoring.  The side effects of this medication were discussed with the patient, including but not limited to agranulocytosis, aplastic anemia, seizures, rashes, retinopathy, and liver toxicity. Patient instructed to call the office should any adverse effect occur.  The patient verbalized understanding of the proper use and possible adverse effects of Plaquenil.  All the patient's questions and concerns were addressed. Zoryve Counseling:  I discussed with the patient that Zoryve is not for use in the eyes, mouth or vagina. The most commonly reported side effects include diarrhea, headache, insomnia, application site pain, upper respiratory tract infections, and urinary tract infections.  All of the patient's questions and concerns were addressed. Calcipotriene Pregnancy And Lactation Text: The use of this medication during pregnancy or lactation is not recommended as there is insufficient data. Minocycline Counseling: Patient advised regarding possible photosensitivity and discoloration of the teeth, skin, lips, tongue and gums.  Patient instructed to avoid sunlight, if possible.  When exposed to sunlight, patients should wear protective clothing, sunglasses, and sunscreen.  The patient was instructed to call the office immediately if the following severe adverse effects occur:  hearing changes, easy bruising/bleeding, severe headache, or vision changes.  The patient verbalized understanding of the proper use and possible adverse effects of minocycline.  All of the patient's questions and concerns were addressed. Cantharidin Counseling:  I discussed with the patient the risks of Cantharidin including but not limited to pain, redness, burning, itching, and blistering. Cibinqo Pregnancy And Lactation Text: It is unknown if this medication will adversely affect pregnancy or breast feeding.  You should not take this medication if you are currently pregnant or planning a pregnancy or while breastfeeding. Oral Minoxidil Counseling- I discussed with the patient the risks of oral minoxidil including but not limited to shortness of breath, swelling of the feet or ankles, dizziness, lightheadedness, unwanted hair growth and allergic reaction.  The patient verbalized understanding of the proper use and possible adverse effects of oral minoxidil.  All of the patient's questions and concerns were addressed. Finasteride Pregnancy And Lactation Text: This medication is absolutely contraindicated during pregnancy. It is unknown if it is excreted in breast milk. Cosentyx Counseling:  I discussed with the patient the risks of Cosentyx including but not limited to worsening of Crohn's disease, immunosuppression, allergic reactions and infections.  The patient understands that monitoring is required including a PPD at baseline and must alert us or the primary physician if symptoms of infection or other concerning signs are noted. Siliq Counseling:  I discussed with the patient the risks of Siliq including but not limited to new or worsening depression, suicidal thoughts and behavior, immunosuppression, malignancy, posterior leukoencephalopathy syndrome, and serious infections.  The patient understands that monitoring is required including a PPD at baseline and must alert us or the primary physician if symptoms of infection or other concerning signs are noted. There is also a special program designed to monitor depression which is required with Siliq. Cyclophosphamide Counseling:  I discussed with the patient the risks of cyclophosphamide including but not limited to hair loss, hormonal abnormalities, decreased fertility, abdominal pain, diarrhea, nausea and vomiting, bone marrow suppression and infection. The patient understands that monitoring is required while taking this medication. Terbinafine Pregnancy And Lactation Text: This medication is Pregnancy Category B and is considered safe during pregnancy. It is also excreted in breast milk and breast feeding isn't recommended. Sski Pregnancy And Lactation Text: This medication is Pregnancy Category D and isn't considered safe during pregnancy. It is excreted in breast milk. Clindamycin Counseling: I counseled the patient regarding use of clindamycin as an antibiotic for prophylactic and/or therapeutic purposes. Clindamycin is active against numerous classes of bacteria, including skin bacteria. Side effects may include nausea, diarrhea, gastrointestinal upset, rash, hives, yeast infections, and in rare cases, colitis. Libtayo Pregnancy And Lactation Text: This medication is contraindicated in pregnancy and when breast feeding. Ilumya Counseling: I discussed with the patient the risks of tildrakizumab including but not limited to immunosuppression, malignancy, posterior leukoencephalopathy syndrome, and serious infections.  The patient understands that monitoring is required including a PPD at baseline and must alert us or the primary physician if symptoms of infection or other concerning signs are noted. Cantharidin Pregnancy And Lactation Text: This medication has not been proven safe during pregnancy. It is unknown if this medication is excreted in breast milk. Thalidomide Counseling: I discussed with the patient the risks of thalidomide including but not limited to birth defects, anxiety, weakness, chest pain, dizziness, cough and severe allergy. Colchicine Counseling:  Patient counseled regarding adverse effects including but not limited to stomach upset (nausea, vomiting, stomach pain, or diarrhea).  Patient instructed to limit alcohol consumption while taking this medication.  Colchicine may reduce blood counts especially with prolonged use.  The patient understands that monitoring of kidney function and blood counts may be required, especially at baseline. The patient verbalized understanding of the proper use and possible adverse effects of colchicine.  All of the patient's questions and concerns were addressed. Xelaaronz Pregnancy And Lactation Text: This medication is Pregnancy Category D and is not considered safe during pregnancy.  The risk during breast feeding is also uncertain. Taltz Counseling: I discussed with the patient the risks of ixekizumab including but not limited to immunosuppression, serious infections, worsening of inflammatory bowel disease and drug reactions.  The patient understands that monitoring is required including a PPD at baseline and must alert us or the primary physician if symptoms of infection or other concerning signs are noted. Fluconazole Counseling:  Patient counseled regarding adverse effects of fluconazole including but not limited to headache, diarrhea, nausea, upset stomach, liver function test abnormalities, taste disturbance, and stomach pain.  There is a rare possibility of liver failure that can occur when taking fluconazole.  The patient understands that monitoring of LFTs and kidney function test may be required, especially at baseline. The patient verbalized understanding of the proper use and possible adverse effects of fluconazole.  All of the patient's questions and concerns were addressed. Aklief Pregnancy And Lactation Text: It is unknown if this medication is safe to use during pregnancy.  It is unknown if this medication is excreted in breast milk.  Breastfeeding women should use the topical cream on the smallest area of the skin for the shortest time needed while breastfeeding.  Do not apply to nipple and areola. Opzelura Counseling:  I discussed with the patient the risks of Opzelura including but not limited to nasopharngitis, bronchitis, ear infection, eosinophila, hives, diarrhea, folliculitis, tonsillitis, and rhinorrhea.  Taken orally, this medication has been linked to serious infections; higher rate of mortality; malignancy and lymphoproliferative disorders; major adverse cardiovascular events; thrombosis; thrombocytopenia, anemia, and neutropenia; and lipid elevations. Hydroxychloroquine Pregnancy And Lactation Text: This medication has been shown to cause fetal harm but it isn't assigned a Pregnancy Risk Category. There are small amounts excreted in breast milk. Ivermectin Counseling:  Patient instructed to take medication on an empty stomach with a full glass of water.  Patient informed of potential adverse effects including but not limited to nausea, diarrhea, dizziness, itching, and swelling of the extremities or lymph nodes.  The patient verbalized understanding of the proper use and possible adverse effects of ivermectin.  All of the patient's questions and concerns were addressed. Quinolones Counseling:  I discussed with the patient the risks of fluoroquinolones including but not limited to GI upset, allergic reaction, drug rash, diarrhea, dizziness, photosensitivity, yeast infections, liver function test abnormalities, tendonitis/tendon rupture. Low Dose Naltrexone Counseling- I discussed with the patient the potential risks and side effects of low dose naltrexone including but not limited to: more vivid dreams, headaches, nausea, vomiting, abdominal pain, fatigue, dizziness, and anxiety. Opzelura Pregnancy And Lactation Text: There is insufficient data to evaluate drug-associated risk for major birth defects, miscarriage, or other adverse maternal or fetal outcomes.  There is a pregnancy registry that monitors pregnancy outcomes in pregnant persons exposed to the medication during pregnancy.  It is unknown if this medication is excreted in breast milk.  Do not breastfeed during treatment and for about 4 weeks after the last dose. Oral Minoxidil Pregnancy And Lactation Text: This medication should only be used when clearly needed if you are pregnant, attempting to become pregnant or breast feeding. Cimetidine Counseling:  I discussed with the patient the risks of Cimetidine including but not limited to gynecomastia, headache, diarrhea, nausea, drowsiness, arrhythmias, pancreatitis, skin rashes, psychosis, bone marrow suppression and kidney toxicity. Zyclara Counseling:  I discussed with the patient the risks of imiquimod including but not limited to erythema, scaling, itching, weeping, crusting, and pain.  Patient understands that the inflammatory response to imiquimod is variable from person to person and was educated regarded proper titration schedule.  If flu-like symptoms develop, patient knows to discontinue the medication and contact us. Litfulo Counseling: I discussed with the patient the risks of Litfulo therapy including but not limited to upper respiratory tract infections, shingles, cold sores, and nausea. Live vaccines should be avoided.  This medication has been linked to serious infections; higher rate of mortality; malignancy and lymphoproliferative disorders; major adverse cardiovascular events; thrombosis; gastrointestinal perforations; neutropenia; lymphopenia; anemia; liver enzyme elevations; and lipid elevations. Acitretin Pregnancy And Lactation Text: This medication is Pregnancy Category X and should not be given to women who are pregnant or may become pregnant in the future. This medication is excreted in breast milk. Birth Control Pills Counseling: Birth Control Pill Counseling: I discussed with the patient the potential side effects of OCPs including but not limited to increased risk of stroke, heart attack, thrombophlebitis, deep venous thrombosis, hepatic adenomas, breast changes, GI upset, headaches, and depression.  The patient verbalized understanding of the proper use and possible adverse effects of OCPs. All of the patient's questions and concerns were addressed. 5-Fu Counseling: 5-Fluorouracil Counseling:  I discussed with the patient the risks of 5-fluorouracil including but not limited to erythema, scaling, itching, weeping, crusting, and pain. Cyclophosphamide Pregnancy And Lactation Text: This medication is Pregnancy Category D and it isn't considered safe during pregnancy. This medication is excreted in breast milk. Solaraze Counseling:  I discussed with the patient the risks of Solaraze including but not limited to erythema, scaling, itching, weeping, crusting, and pain. Bexarotene Counseling:  I discussed with the patient the risks of bexarotene including but not limited to hair loss, dry lips/skin/eyes, liver abnormalities, hyperlipidemia, pancreatitis, depression/suicidal ideation, photosensitivity, drug rash/allergic reactions, hypothyroidism, anemia, leukopenia, infection, cataracts, and teratogenicity.  Patient understands that they will need regular blood tests to check lipid profile, liver function tests, white blood cell count, thyroid function tests and pregnancy test if applicable. Imiquimod Counseling:  I discussed with the patient the risks of imiquimod including but not limited to erythema, scaling, itching, weeping, crusting, and pain.  Patient understands that the inflammatory response to imiquimod is variable from person to person and was educated regarded proper titration schedule.  If flu-like symptoms develop, patient knows to discontinue the medication and contact us. Cyclosporine Counseling:  I discussed with the patient the risks of cyclosporine including but not limited to hypertension, gingival hyperplasia,myelosuppression, immunosuppression, liver damage, kidney damage, neurotoxicity, lymphoma, and serious infections. The patient understands that monitoring is required including baseline blood pressure, CBC, CMP, lipid panel and uric acid, and then 1-2 times monthly CMP and blood pressure. Litfulo Pregnancy And Lactation Text: Based on animal studies, Lifulo may cause embryo-fetal harm when administered to pregnant women.  The medication should not be used in pregnancy.  Breastfeeding is not recommended during treatment. Odomzo Counseling- I discussed with the patient the risks of Odomzo including but not limited to nausea, vomiting, diarrhea, constipation, weight loss, changes in the sense of taste, decreased appetite, muscle spasms, and hair loss.  The patient verbalized understanding of the proper use and possible adverse effects of Odomzo.  All of the patient's questions and concerns were addressed. Solaraze Pregnancy And Lactation Text: This medication is Pregnancy Category B and is considered safe. There is some data to suggest avoiding during the third trimester. It is unknown if this medication is excreted in breast milk. Birth Control Pills Pregnancy And Lactation Text: This medication should be avoided if pregnant and for the first 30 days post-partum. Simponi Counseling:  I discussed with the patient the risks of golimumab including but not limited to myelosuppression, immunosuppression, autoimmune hepatitis, demyelinating diseases, lymphoma, and serious infections.  The patient understands that monitoring is required including a PPD at baseline and must alert us or the primary physician if symptoms of infection or other concerning signs are noted. Topical Sulfur Applications Pregnancy And Lactation Text: This medication is considered safe during pregnancy and breast feeding secondary to limited systemic absorption. Dupixent Counseling: I discussed with the patient the risks of dupilumab including but not limited to eye inflammation and irritation, cold sores, injection site reactions, allergic reactions and increased risk of parasitic infection. The patient understands that monitoring is required and they must alert us or the primary physician if symptoms of infection or other concerning signs are noted. Azelaic Acid Counseling: Patient counseled that medicine may cause skin irritation and to avoid applying near the eyes.  In the event of skin irritation, the patient was advised to reduce the amount of the drug applied or use it less frequently.   The patient verbalized understanding of the proper use and possible adverse effects of azelaic acid.  All of the patient's questions and concerns were addressed. Clindamycin Pregnancy And Lactation Text: This medication can be used in pregnancy if certain situations. Clindamycin is also present in breast milk. Topical Ketoconazole Counseling: Patient counseled that this medication may cause skin irritation or allergic reactions.  In the event of skin irritation, the patient was advised to reduce the amount of the drug applied or use it less frequently.   The patient verbalized understanding of the proper use and possible adverse effects of ketoconazole.  All of the patient's questions and concerns were addressed. Picato Counseling:  I discussed with the patient the risks of Picato including but not limited to erythema, scaling, itching, weeping, crusting, and pain. Low Dose Naltrexone Pregnancy And Lactation Text: Naltrexone is pregnancy category C.  There have been no adequate and well-controlled studies in pregnant women.  It should be used in pregnancy only if the potential benefit justifies the potential risk to the fetus.   Limited data indicates that naltrexone is minimally excreted into breastmilk. Doxycycline Counseling:  Patient counseled regarding possible photosensitivity and increased risk for sunburn.  Patient instructed to avoid sunlight, if possible.  When exposed to sunlight, patients should wear protective clothing, sunglasses, and sunscreen.  The patient was instructed to call the office immediately if the following severe adverse effects occur:  hearing changes, easy bruising/bleeding, severe headache, or vision changes.  The patient verbalized understanding of the proper use and possible adverse effects of doxycycline.  All of the patient's questions and concerns were addressed. Detail Level: Simple Acitretin Counseling:  I discussed with the patient the risks of acitretin including but not limited to hair loss, dry lips/skin/eyes, liver damage, hyperlipidemia, depression/suicidal ideation, photosensitivity.  Serious rare side effects can include but are not limited to pancreatitis, pseudotumor cerebri, bony changes, clot formation/stroke/heart attack.  Patient understands that alcohol is contraindicated since it can result in liver toxicity and significantly prolong the elimination of the drug by many years. Griseofulvin Counseling:  I discussed with the patient the risks of griseofulvin including but not limited to photosensitivity, cytopenia, liver damage, nausea/vomiting and severe allergy.  The patient understands that this medication is best absorbed when taken with a fatty meal (e.g., ice cream or french fries). Otezla Counseling: The side effects of Otezla were discussed with the patient, including but not limited to worsening or new depression, weight loss, diarrhea, nausea, upper respiratory tract infection, and headache. Patient instructed to call the office should any adverse effect occur.  The patient verbalized understanding of the proper use and possible adverse effects of Otezla.  All the patient's questions and concerns were addressed. Soolantra Counseling: I discussed with the patients the risks of topial Soolantra. This is a medicine which decreases the number of mites and inflammation in the skin. You experience burning, stinging, eye irritation or allergic reactions.  Please call our office if you develop any problems from using this medication. Otezla Pregnancy And Lactation Text: This medication is Pregnancy Category C and it isn't known if it is safe during pregnancy. It is unknown if it is excreted in breast milk. Olumiant Counseling: I discussed with the patient the risks of Olumiant therapy including but not limited to upper respiratory tract infections, shingles, cold sores, and nausea. Live vaccines should be avoided.  This medication has been linked to serious infections; higher rate of mortality; malignancy and lymphoproliferative disorders; major adverse cardiovascular events; thrombosis; gastrointestinal perforations; neutropenia; lymphopenia; anemia; liver enzyme elevations; and lipid elevations. Rifampin Counseling: I discussed with the patient the risks of rifampin including but not limited to liver damage, kidney damage, red-orange body fluids, nausea/vomiting and severe allergy. Adbry Counseling: I discussed with the patient the risks of tralokinumab including but not limited to eye infection and irritation, cold sores, injection site reactions, worsening of asthma, allergic reactions and increased risk of parasitic infection.  Live vaccines should be avoided while taking tralokinumab. The patient understands that monitoring is required and they must alert us or the primary physician if symptoms of infection or other concerning signs are noted. Dapsone Counseling: I discussed with the patient the risks of dapsone including but not limited to hemolytic anemia, agranulocytosis, rashes, methemoglobinemia, kidney failure, peripheral neuropathy, headaches, GI upset, and liver toxicity.  Patients who start dapsone require monitoring including baseline LFTs and weekly CBCs for the first month, then every month thereafter.  The patient verbalized understanding of the proper use and possible adverse effects of dapsone.  All of the patient's questions and concerns were addressed. Spironolactone Counseling: Patient advised regarding risks of diarrhea, abdominal pain, hyperkalemia, birth defects (for female patients), liver toxicity and renal toxicity. The patient may need blood work to monitor liver and kidney function and potassium levels while on therapy. The patient verbalized understanding of the proper use and possible adverse effects of spironolactone.  All of the patient's questions and concerns were addressed. Wartpeel Counseling:  I discussed with the patient the risks of Wartpeel including but not limited to erythema, scaling, itching, weeping, crusting, and pain. Bexarotene Pregnancy And Lactation Text: This medication is Pregnancy Category X and should not be given to women who are pregnant or may become pregnant. This medication should not be used if you are breast feeding. Tremfya Counseling: I discussed with the patient the risks of guselkumab including but not limited to immunosuppression, serious infections, and drug reactions.  The patient understands that monitoring is required including a PPD at baseline and must alert us or the primary physician if symptoms of infection or other concerning signs are noted. Dupixent Pregnancy And Lactation Text: This medication likely crosses the placenta but the risk for the fetus is uncertain. This medication is excreted in breast milk. Tranexamic Acid Counseling:  Patient advised of the small risk of bleeding problems with tranexamic acid. They were also instructed to call if they developed any nausea, vomiting or diarrhea. All of the patient's questions and concerns were addressed.

## 2024-09-11 NOTE — ED ADULT TRIAGE NOTE - SOURCE OF INFORMATION
Mr. Antoine has known hyperlipidemia.  He is currently on atorvastatin 20 mg.  Will continue the same  
Patient has heart failure with reduced ejection fraction that had improved on appropriate GDMT from less than 20% to 46 - 50%.  He was previously on goal dose GDMT, but since he has not had any medications since Friday and his systolic has been anywhere from 92-1 41 per his home log I will restart at lower doses and titrate medications as tolerated.  Will make the following changes to his heart failure medications:      1.  Increase carvedilol 3.125 mg take 2 tablets twice a day.  2.  Restart Entresto 24/26 mg take 1 tablet twice a day for 1 week, then increase to 2 tablets twice daily.  Once current supply is complete take Entresto 97/103 mg a half a tablet twice a day.  3.  Restart spironolactone 25 mg take 1 tablet every other day.  4.  Abide by a 1500 cc fluid restriction.  5.  Do blood work 1 to 2 days prior to next visit.  6.  Do a heart rate blood pressure and weight log and bring it to next appointment.  
Patient's kidney function remains stable.  Will continue to monitor with labs.  
Patient

## 2024-09-17 NOTE — ED ADULT NURSE NOTE - CHPI ED NUR TIMING2
09/17/24      Alice Heaton  [address]  [facility] [address]       Your EGD is scheduled with: Dr. Pastor Mccord    Your procedure is scheduled on 9/27/24   Your estimated arrival time is 11:45 AM.  A pre-admissions nurse will call you with an exact time 1-2 day prior to your procedure.      [facility]  Entrance [address]  [address] [place] [address]         PLANNING FOR YOUR PROCEDURE         The following instructions are very important.  Please read the entire packet thoroughly and follow the instructions as outlined.        Please be aware that emergencies do occur which may cause changes in the timing of your procedure.  Every attempt will be made to keep you informed of changes as they occur.  We appreciate your cooperation with this; please do not hesitate to let us know what can be done to make your stay as pleasant as possible while you wait.       Arrange for a Patient Representative   You CANNOT drive yourself home after your procedure. You must make arrangements to have a patient representative (18 years or older) take you home and stay with you overnight after your procedure as you will be receiving sedation. You will not be allowed to take a taxi, bus, medi-van service or walk home alone, must have a representative. You and your representative should allow approximately 2-3 hours total time at the facility. If you do not have a patient representative, your procedure may be delayed or canceled.       **Spinal Cord Stimulator**  If you have a spinal cord stimulator please bring your remote with you day of surgery.  If we are unable to turn off spinal cord stimulator prior to procedure, your procedure will be canceled and rescheduled to a new date.        Cancelling a Procedure   If it is necessary for you to cancel your procedure, please notify our office at least 2 business days in advance, [phone].  If you fail to show up for your procedure without notification  you may need to reschedule your procedure with another provider.       It is extremely important to contact our office if you become positive with Covid at any time prior to your scheduled procedure.                   PRIOR TO YOUR PROCEDURE      Arrange for someone to drive you to/from your procedure and stay with you overnight  If you are diabetic: Please call the doctor that manages your diabetic medications and let them know that you will be preparing and having a procedure.  They may adjust your medications while you prepare for your procedure.       If you are on any of the following medications:   Phentermine  Wegovy   Ozempic   Trulicity DO NOT TAKE AFTER 9/20/24 UNTIL AFTER YOUR PROCEDURE   Bydureon BCise   Mounjaro   Zepbound  Then follow these instructions:   STOP these medications for SEVEN (7) days prior to procedure for both Diabetic and Non-Diabetic patients.     If you are are any of the following medications:   Canagliflozin (Invokana)   Dapaglifozin (Farxiga)  Dapaglifozin/Saxagliptin (Qtem)  Empagliflozin (Jardiance)  Empagliflozin/Linafliptin (Glyxamb)  Ertugliflozin (Stelatro)  Ertugliflozin/Stagliptin (Steglujan)  STOP these medications for THREE (3) days prior to procedure for both Diabetic and Non-Diabetic patients.     If you are on any of the following medications:   Ryblesus   Byetta   Saxenda    Victoza    Then follow these instructions:   STOP these medications for ONE (1) day prior to procedure for both Diabetic and Non-Diabetic patients.      If you are on any of the following medications you must contact your prescribing provider for bridge insulin:   Xultophy   Soliqua      If you are taking an anticoagulant (blood thinner):  Eliquis.  We will send Dr Galan a message to let them know that you will be having a procedure.  Their office will contact you with instructions on how to manage this prescription.          5 DAYS BEFORE YOUR PROCEDURE      STOP- anticoagulants/blood thinners  as instructed by prescribing physician.  STOP- ASPIRIN and all aspirin-containing products   STOP- all herbal supplements, iron supplements, and fish oil   STOP- all NSAIDS which include ibuprofen, Advil, Motrin, Naprosyn, Aleve, Celebrex, Indocin, Meloxicam, and Piroxicam   You may take tylenol products   You may take a multivitamin        DAY OF YOUR PROCEDURE      No solid food after midnight.  Clear liquids are okay until 3 hours prior to your arrival time.    Up to 2 hours prior to the procedure you may take oral blood pressure, seizure and breathing medications with a very small sip of water.   All other medications will wait until after the procedure.   Remove all jewelry, piercings, and removable dental work prior to coming to your procedure      Clear Liquid Diet  Clear liquids include water, carbonated soda, flavored water, sports drinks, coffee (no creamer), tea, fruit ices made without pulp or fruit pieces, hard candy, popsicles, bouillon, clear broth, clear fruit juices (no pulp), and gelatin.     DO NOT consume any milk or dairy products    DO NOT consume any drinks that are red, dark blue, or purple in color      If you were told to stop any medications prior to your procedure review with the doctor, before you leave, on when to restart these medications.               Sincerely,   Dr Pastor [facility]-[address] [id number] [phone]                 gradual onset

## 2024-10-11 NOTE — PATIENT PROFILE ADULT - NSPROEXTENSIONSOFSELF_GEN_A_NUR
----- Message from Nitin Odom MD sent at 10/11/2024  8:17 AM CDT -----  Results Reviewed.  PSA continues to be less than 0.01 with no evidence of disease.  Please notify patient.      eyeglasses

## 2024-10-14 NOTE — DISCHARGE NOTE PROVIDER - NSDCHHNEEDSERVICE_GEN_ALL_CORE
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 His PCR fluctuates between MMR4 and MMR3; last BCR/ABL 0.019%  CBC reviewed Check his BCR-ABL today. Continue gleevec 400 mg daily -refilled today Check his CMP Continue to follow with psychiatry. He will continue to follow up every 4 months. 
Rehabilitation services/Teaching and training

## 2024-11-21 NOTE — ED ADULT TRIAGE NOTE - DIRECT TO ROOM CARE INITIATED:
Pt. Was noticed to be in his room most of this shift. Pt. Denied any c/o auditory or visual hallucinations at this time, but was constantly smiling with delayed verbal response. He denied any c/o suicidal or homicidal ideations. Pt. To his night medications without diff. He did c/o anxiety and was admin Hydroxyzine 50 mg by mouth and is now in bed. Encouraged Pt. To verbalized his feelings. Will cont. To monitor Pt.  Problem: Adult Behavioral Health Plan of Care  Goal: Plan of Care Review  Outcome: Progressing  Goal: Patient-Specific Goal (Individualization)  Outcome: Progressing  Goal: Adheres to Safety Considerations for Self and Others  Outcome: Progressing  Goal: Develops/Participates in Therapeutic [place] to Support Successful Transition  Outcome: Progressing     Problem: Excessive Substance Use  Goal: Optimized Energy Level (Excessive Substance Use)  Outcome: Progressing  Goal: Improved Behavioral Control (Excessive Substance Use)  Outcome: Progressing  Goal: Increased Participation and Engagement (Excessive Substance Use)  Outcome: Progressing  Goal: Improved Physiologic Symptoms (Excessive Substance Use)  Outcome: Progressing  Goal: Enhanced Social, Occupational or Functional Skills (Excessive Substance Use)  Outcome: Progressing     Problem: Suicide Risk  Goal: Absence of Self-Harm  Outcome: Progressing      25-Nov-2018 11:39

## 2024-12-13 NOTE — PROGRESS NOTE ADULT - I WAS PHYSICALLY PRESENT FOR THE KEY PORTIONS OF THE EVALUATION AND MANAGEMENT (E/M) SERVICE PROVIDED.  I AGREE WITH THE ABOVE HISTORY, PHYSICAL, AND PLAN WHICH I HAVE REVIEWED AND EDITED WHERE APPROPRIATE
Issa Sanderson  : 1946  Primary: Medicare Part A And B (Medicare)  Secondary: [employer] MEDICARE SUPP [facility] [place] [address]  Phone: [phone]  Fax: [phone] Plan Frequency: 2/week x 12 weeks    Plan of Care/Certification Expiration Date: 25        Plan of Care/Certification Expiration Date:  Plan of Care/Certification Expiration Date: 25    Frequency/Duration:   Plan Frequency: 2/week x 12 weeks      Time In/Out:   Time In: 1115  Time Out: 1200      PT Visit Info:    Total # of Visits to Date: 14      Visit Count:  14    OUTPATIENT PHYSICAL THERAPY:   Treatment Note 2024       Episode  (low back and hip pain)               Treatment Diagnosis:    No data found  Medical/Referring Diagnosis:    Chronic left-sided low back pain, unspecified whether sciatica present  Unsteadiness on feet      Referring Physician:  Pascual Lares DO MD Orders:  PT Eval and Treat   Return MD Appt:  25   Date of Onset:  Onset Date: 24     Allergies:   Patient has no known allergies.  Restrictions/Precautions:   None      Interventions Planned (Treatment may consist of any combination of the following):  Current Treatment Recommendations: Strengthening; ROM; Home exercise program; Manual; Aquatics; Stair training    See Assessment Note    Subjective Comments:  doing pretty well. I am still struggling with the theraband ex on the wall.    Initial Pain Level::     0 /10    Post Session Pain Level:      0  /10  Medications Last Reviewed:  2024  Updated Objective Findings:  None Today  Treatment   THERAPEUTIC EXERCISE: (45 minutes):    Exercises per grid below to improve mobility.  Required minimal visual and verbal cues to promote proper body alignment and promote proper body posture.  Progressed range and repetitions as indicated.   Date  24 Date  24 Date  10/26/24 Date  24 Date  24 
Statement Selected

## 2024-12-17 NOTE — AIRWAY REMOVAL NOTE  ADULT & PEDS - RESPIRATORY EXPANSION/ACCESSORY MUSCLES/RETRACTIONS
What Type Of Note Output Would You Prefer (Optional)?: Bullet Format What Is The Reason For Today's Visit?: Full Body Skin Examination What Is The Reason For Today's Visit? (Being Monitored For X): concerning skin lesions on an annual basis no use of accessory muscles/no retractions/expansion symmetric

## 2024-12-20 NOTE — DISCHARGE NOTE NURSING/CASE MANAGEMENT/SOCIAL WORK - NSTRANSFEREYEGLASSESPAIRS_GEN_A_NUR
RF phentermine  Risk and benefit discussed  Discussed caloric limit for age and gender, [employer] Brittany recommended  Exercise at least 3x a week at least 45 min each session aerobically  [place]AR[place] reviewed  CSA and UDS current/updated  Follow up 1 month    
1 pair

## 2025-01-15 NOTE — PROGRESS NOTE BEHAVIORAL HEALTH - ORIENTED TO PLACE
Yes Otc Regimen: TheraTears for dry eyes. Detail Level: Zone Otc Regimen: Vaseline or Aquaphor to lips and nose

## 2025-02-05 NOTE — ED ADULT TRIAGE NOTE - WEIGHT IN KG
Preop done for eye surgery on 2/17/25.  Message sent asking PCP to addend note to include gallbladder surgery.  Patient was given a [facility] Surgical instruction sheet for Patient was given [facility] patient instruction of CHG Soap - fywb [id number].  
Surgery date changed to 2/17/25  
65.8
IV intact

## 2025-03-05 NOTE — PATIENT PROFILE ADULT - NSPROPASSIVESMOKEEXPOSURE_GEN_A_NUR
Occupational Therapy:     Orders acknowledged & chart reviewed up to date. Patient is currently intubated/sedated s/p Impella placement POD # 1. Per RN patient may be extubated later today. Will continue to follow & complete OT evaluation as medically appropriate.    Thank you,  Patricia Evans, OTD, OTR/L       Unknown

## 2025-03-14 NOTE — PRE-OP CHECKLIST - ALLERGY BAND ON
Department of Anesthesiology  Postprocedure Note    Patient: Na Johnston  MRN: [id number]  YOB: 1952  Date of evaluation: 3/14/2025    Procedure Summary       Date: 03/14/25 Room / Location: [place] [address] / [facility]    Anesthesia Start: 0908 Anesthesia Stop: 1004    Procedures:       COLONOSCOPY POLYPECTOMY SNARE/BIOPSY      COLONOSCOPY CONTROL HEMORRHAGE Diagnosis:       History of colon polyps      (History of colon polyps [Z86.0100])    Surgeons: Chris Monique MD Responsible Provider: Tima Fitch MD    Anesthesia Type: MAC ASA Status: 3            Anesthesia Type: No value filed.    Chelsy Phase I: Chelsy Score: 10    Chelsy Phase II: Chelsy Score: 9    Anesthesia Post Evaluation    Patient location during evaluation: PACU  Patient participation: complete - patient participated  Level of consciousness: awake and alert  Pain score: 0  Airway patency: patent  Nausea & Vomiting: no nausea and no vomiting  Cardiovascular status: blood pressure returned to baseline  Respiratory status: acceptable  Hydration status: stable  Pain management: adequate    No notable events documented.   done ACE inhibitors/done

## 2025-03-17 NOTE — PHYSICAL THERAPY INITIAL EVALUATION ADULT - LEVEL OF INDEPENDENCE: SCOOT/BRIDGE, REHAB EVAL
Relayed providers recommendations below, pt verbalized understanding and had no further questions at this time.   minimum assist (75% patients effort)

## 2025-03-24 NOTE — ED PROVIDER NOTE - CROS ED MUSC ALL NEG
- - -
GENERAL: no acute distress, comfortably in bed  HEENT: Atraumatic, normocephalic, non-icteric, no JVD  NEURO:  A&Ox3, no focal deficits, moving all extremities spontaneously, no dysarthria, CN II-XII grossly intact  LUNGS: CTAB, no wrr, non-labored breathing  HEART: RRR, no murmur appreciated  ABD: Soft, non-tender, non-distended, no organomegaly, no appreciable masses,   Musc: tenderness to ribs 7-9 on R no crepitus   SKIN: No rashes or lesions

## 2025-06-24 NOTE — PHYSICAL THERAPY INITIAL EVALUATION ADULT - TRANSFER SKILLS, REHAB EVAL
Bilateral pleural effusion
independent
Bilateral pleural effusion

## 2025-07-11 NOTE — PATIENT PROFILE ADULT - OVER THE PAST TWO WEEKS, HAVE YOU FELT LITTLE INTEREST OR PLEASURE IN DOING THINGS?
Copied from CRM #[id number]. Topic: General Inquiry - Return Call  >> Jul 9, 2025  8:13 AM Kristine wrote:  Type:  Patient Returning Call    Who Called: patient   Who Left Message for Patient: nurse  Does the patient know what this is regarding?:  Would the patient rather a call back or a response via [facility]? Call back  Best Call Back Number: [phone]    Additional Information:  >> Jul 11, 2025  9:46 AM Med Assistant Melton wrote:  7-10-25 @ 1:48pm.. pt notified of results.. LDN  >> Jul 10, 2025  9:08 AM Med Assistant Briscoe wrote:    ----- Message -----  From: Kristine Ortiz  Sent: 7/9/2025   8:14 AM CDT  To: Phyllis Ontiveros Staff    
no

## (undated) DEVICE — SUT QUILL MONODERM 3-0 30CM 19MM

## (undated) DEVICE — ELCTR BOVIE PENCIL BLADE 10FT

## (undated) DEVICE — POSITIONER FOAM EGG CRATE ULNAR 2PCS (PINK)

## (undated) DEVICE — SURGIPHOR STERILE WOUND IRR POVIDONE IODINE

## (undated) DEVICE — D HELP - CLEARVIEW CLEARIFY SYSTEM

## (undated) DEVICE — SUT VICRYL PLUS 2-0 18" CT-1 (POP-OFF)

## (undated) DEVICE — DRAPE U 47X51" LF STERILE

## (undated) DEVICE — SUT VICRYL 4-0 2" RB-1

## (undated) DEVICE — TUBING STRYKER PNEUMOCLEAR HIGH FLOW

## (undated) DEVICE — LAPAROSCOPIC ENDO FLO IRRIGATOR DISP

## (undated) DEVICE — PACK TOTAL KNEE

## (undated) DEVICE — SUT VICRYL 0 18" CT-1 UNDYED (POP-OFF)

## (undated) DEVICE — DRSG PREVENA PLUS SYSTEM

## (undated) DEVICE — Device

## (undated) DEVICE — PREP CHLORAPREP SWABSTICK 5.25ML

## (undated) DEVICE — SUT VICRYL 2-0 27" RB-1

## (undated) DEVICE — BRACE KNEE IMMOBILIZER SPLINT BASIC UNIVERSAL LARGE 24"

## (undated) DEVICE — TUBING SUCTION NONCONDUCTIVE 6MM X 12FT

## (undated) DEVICE — GLV 7.5 PROTEXIS (WHITE)

## (undated) DEVICE — DRSG ACE BANDAGE 6"

## (undated) DEVICE — SUT VICRYL 3-0 27" CT-1

## (undated) DEVICE — TUBING PLUME AWAY 4.0

## (undated) DEVICE — HANDLE ETHICON ENDOPATH PROBE PLUS II PISTOL GRIP 5MM

## (undated) DEVICE — TAPE SILK 3"

## (undated) DEVICE — SYR ASEPTO

## (undated) DEVICE — GLV 8.5 PROTEXIS (WHITE)

## (undated) DEVICE — DRAPE LIGHT HANDLE COVER (GREEN)

## (undated) DEVICE — GLV 8 PROTEXIS (WHITE)

## (undated) DEVICE — TROCAR APPLIED MEDICAL KII FIOS FIRST ENTRY 5MM X 100MM Z-THREAD

## (undated) DEVICE — CLIP APPLR DISP 5MM

## (undated) DEVICE — SLEEVE APPLIED MEDICAL KII 5MM X100MM Z-THREAD

## (undated) DEVICE — ELCTR GROUNDING PAD ADULT COVIDIEN

## (undated) DEVICE — DRSG ACE BANDAGE 4"

## (undated) DEVICE — DRSG KERLIX ROLL 4.5"

## (undated) DEVICE — NDL MAX CORE 18G X 25CM

## (undated) DEVICE — MARKING PEN W RULER

## (undated) DEVICE — DRSG STOCKINETTE IMPERVIOUS XL

## (undated) DEVICE — VENODYNE/SCD SLEEVE CALF MEDIUM

## (undated) DEVICE — SUT VICRYL 0 27" UR-6

## (undated) DEVICE — SUT PROLENE 1 30" CT

## (undated) DEVICE — BLADE SURGICAL #11 CARBON

## (undated) DEVICE — MONOPOLAR CORD HI FREQ DISPOSABLE

## (undated) DEVICE — GLV 7 PROTEXIS (WHITE)

## (undated) DEVICE — MIDAS REX LEGEND CYLINDER FLUTED SM BORE 6.0MM X 15CM

## (undated) DEVICE — PACK UPPER BODY

## (undated) DEVICE — WARMING BLANKET UPPER ADULT

## (undated) DEVICE — DRAPE TOWEL BLUE 17" X 24"

## (undated) DEVICE — STAPLER COVIDIEN ENDO GIA STANDARD HANDLE

## (undated) DEVICE — ELCTR THUNDERBEAT HANDPIECE 5MM X 35CM FRONT CONTROL

## (undated) DEVICE — SUT MONOCRYL 4-0 27" PS-2 UNDYED

## (undated) DEVICE — NDL HYPO SAFE 22G X 1" (BLACK)

## (undated) DEVICE — ELCTR SHAFT ETHICON  ENDOPATH PLUS II HOOK 5MM X 34CM

## (undated) DEVICE — DRSG WEBRIL 6"

## (undated) DEVICE — GOWN IMPERV XL

## (undated) DEVICE — DRSG DERMABOND 0.7ML

## (undated) DEVICE — SUT PDS II 3-0 27" SH UNDYED

## (undated) DEVICE — TIP METZENBAUM SCISSOR MONOPOLAR ENDOCUT (ORANGE)

## (undated) DEVICE — SPONGE ENDO PEANUT 5MM

## (undated) DEVICE — SUT VICRYL 3-0 18" TIES UNDYED

## (undated) DEVICE — SUT QUILL MONODERM 2-0 16CM

## (undated) DEVICE — SUT VICRYL 3-0 27" SH

## (undated) DEVICE — SUT VICRYL 2-0 36" CT UNDYED

## (undated) DEVICE — BLADE SURGICAL #15 CARBON

## (undated) DEVICE — BAG ETHICON SPECIMEN RETRIEVAL 4 X 6"

## (undated) DEVICE — PACK GENERAL LAPAROSCOPY

## (undated) DEVICE — SUT VICRYL 0 18" ENDOLOOP LIGATURE